# Patient Record
Sex: FEMALE | Race: WHITE | NOT HISPANIC OR LATINO
[De-identification: names, ages, dates, MRNs, and addresses within clinical notes are randomized per-mention and may not be internally consistent; named-entity substitution may affect disease eponyms.]

---

## 2016-11-30 RX ORDER — SERTRALINE 25 MG/1
100 TABLET, FILM COATED ORAL
Qty: 0 | Refills: 0 | COMMUNITY
Start: 2016-11-30

## 2016-11-30 RX ORDER — SERTRALINE 25 MG/1
1 TABLET, FILM COATED ORAL
Qty: 0 | Refills: 0 | COMMUNITY
Start: 2016-11-30

## 2016-12-01 RX ORDER — ZILEUTON 600 MG/1
2 TABLET, MULTILAYER, EXTENDED RELEASE ORAL
Qty: 0 | Refills: 0 | COMMUNITY
Start: 2016-12-01

## 2017-01-17 ENCOUNTER — APPOINTMENT (OUTPATIENT)
Dept: PAIN MANAGEMENT | Facility: CLINIC | Age: 67
End: 2017-01-17

## 2017-02-14 ENCOUNTER — MEDICATION RENEWAL (OUTPATIENT)
Age: 67
End: 2017-02-14

## 2017-03-07 ENCOUNTER — APPOINTMENT (OUTPATIENT)
Dept: PAIN MANAGEMENT | Facility: CLINIC | Age: 67
End: 2017-03-07

## 2017-03-07 VITALS
HEART RATE: 101 BPM | DIASTOLIC BLOOD PRESSURE: 72 MMHG | WEIGHT: 95 LBS | SYSTOLIC BLOOD PRESSURE: 110 MMHG | HEIGHT: 57 IN | BODY MASS INDEX: 20.49 KG/M2

## 2017-04-05 ENCOUNTER — MEDICATION RENEWAL (OUTPATIENT)
Age: 67
End: 2017-04-05

## 2017-05-01 ENCOUNTER — MESSAGE (OUTPATIENT)
Age: 67
End: 2017-05-01

## 2017-05-02 ENCOUNTER — MEDICATION RENEWAL (OUTPATIENT)
Age: 67
End: 2017-05-02

## 2017-05-02 ENCOUNTER — OTHER (OUTPATIENT)
Age: 67
End: 2017-05-02

## 2017-05-23 ENCOUNTER — APPOINTMENT (OUTPATIENT)
Dept: PAIN MANAGEMENT | Facility: CLINIC | Age: 67
End: 2017-05-23

## 2017-05-23 VITALS
HEART RATE: 105 BPM | WEIGHT: 94 LBS | SYSTOLIC BLOOD PRESSURE: 137 MMHG | DIASTOLIC BLOOD PRESSURE: 78 MMHG | HEIGHT: 57 IN | BODY MASS INDEX: 20.28 KG/M2

## 2017-06-10 ENCOUNTER — EMERGENCY (EMERGENCY)
Facility: HOSPITAL | Age: 67
LOS: 1 days | Discharge: HOME | End: 2017-06-10
Attending: EMERGENCY MEDICINE | Admitting: EMERGENCY MEDICINE
Payer: MEDICARE

## 2017-06-10 VITALS
TEMPERATURE: 99 F | SYSTOLIC BLOOD PRESSURE: 133 MMHG | OXYGEN SATURATION: 98 % | DIASTOLIC BLOOD PRESSURE: 77 MMHG | HEART RATE: 70 BPM | RESPIRATION RATE: 18 BRPM

## 2017-06-10 VITALS
DIASTOLIC BLOOD PRESSURE: 82 MMHG | OXYGEN SATURATION: 97 % | RESPIRATION RATE: 18 BRPM | HEART RATE: 79 BPM | TEMPERATURE: 99 F | SYSTOLIC BLOOD PRESSURE: 141 MMHG

## 2017-06-10 DIAGNOSIS — Z96.659 PRESENCE OF UNSPECIFIED ARTIFICIAL KNEE JOINT: Chronic | ICD-10-CM

## 2017-06-10 DIAGNOSIS — R29.810 FACIAL WEAKNESS: ICD-10-CM

## 2017-06-10 DIAGNOSIS — Z98.42 CATARACT EXTRACTION STATUS, LEFT EYE: Chronic | ICD-10-CM

## 2017-06-10 DIAGNOSIS — Z90.710 ACQUIRED ABSENCE OF BOTH CERVIX AND UTERUS: Chronic | ICD-10-CM

## 2017-06-10 PROBLEM — K51.90 ULCERATIVE COLITIS, UNSPECIFIED, WITHOUT COMPLICATIONS: Chronic | Status: ACTIVE | Noted: 2017-05-19

## 2017-06-10 PROBLEM — K86.89 OTHER SPECIFIED DISEASES OF PANCREAS: Chronic | Status: ACTIVE | Noted: 2017-05-19

## 2017-06-10 PROBLEM — J18.9 PNEUMONIA, UNSPECIFIED ORGANISM: Chronic | Status: ACTIVE | Noted: 2017-05-19

## 2017-06-10 PROBLEM — I27.2 OTHER SECONDARY PULMONARY HYPERTENSION: Chronic | Status: ACTIVE | Noted: 2017-05-19

## 2017-06-10 PROBLEM — R73.02 IMPAIRED GLUCOSE TOLERANCE (ORAL): Chronic | Status: ACTIVE | Noted: 2017-05-19

## 2017-06-10 LAB
ALBUMIN SERPL ELPH-MCNC: 3.8 G/DL — SIGNIFICANT CHANGE UP (ref 3.3–5)
ALP SERPL-CCNC: 62 U/L — SIGNIFICANT CHANGE UP (ref 40–120)
ALT FLD-CCNC: 13 U/L RC — SIGNIFICANT CHANGE UP (ref 10–45)
ANION GAP SERPL CALC-SCNC: 15 MMOL/L — SIGNIFICANT CHANGE UP (ref 5–17)
APTT BLD: 28.4 SEC — SIGNIFICANT CHANGE UP (ref 27.5–37.4)
AST SERPL-CCNC: 14 U/L — SIGNIFICANT CHANGE UP (ref 10–40)
BILIRUB SERPL-MCNC: 0.4 MG/DL — SIGNIFICANT CHANGE UP (ref 0.2–1.2)
BUN SERPL-MCNC: 18 MG/DL — SIGNIFICANT CHANGE UP (ref 7–23)
CALCIUM SERPL-MCNC: 8.9 MG/DL — SIGNIFICANT CHANGE UP (ref 8.4–10.5)
CHLORIDE SERPL-SCNC: 96 MMOL/L — SIGNIFICANT CHANGE UP (ref 96–108)
CO2 SERPL-SCNC: 28 MMOL/L — SIGNIFICANT CHANGE UP (ref 22–31)
CREAT SERPL-MCNC: 0.7 MG/DL — SIGNIFICANT CHANGE UP (ref 0.5–1.3)
GLUCOSE SERPL-MCNC: 59 MG/DL — LOW (ref 70–99)
HCT VFR BLD CALC: 34.9 % — SIGNIFICANT CHANGE UP (ref 34.5–45)
HGB BLD-MCNC: 11.6 G/DL — SIGNIFICANT CHANGE UP (ref 11.5–15.5)
INR BLD: 0.93 RATIO — SIGNIFICANT CHANGE UP (ref 0.88–1.16)
MCHC RBC-ENTMCNC: 32.8 PG — SIGNIFICANT CHANGE UP (ref 27–34)
MCHC RBC-ENTMCNC: 33.2 GM/DL — SIGNIFICANT CHANGE UP (ref 32–36)
MCV RBC AUTO: 98.8 FL — SIGNIFICANT CHANGE UP (ref 80–100)
PLATELET # BLD AUTO: 359 K/UL — SIGNIFICANT CHANGE UP (ref 150–400)
POTASSIUM SERPL-MCNC: 3.2 MMOL/L — LOW (ref 3.5–5.3)
POTASSIUM SERPL-SCNC: 3.2 MMOL/L — LOW (ref 3.5–5.3)
PROT SERPL-MCNC: 5.7 G/DL — LOW (ref 6–8.3)
PROTHROM AB SERPL-ACNC: 10.1 SEC — SIGNIFICANT CHANGE UP (ref 9.8–12.7)
RBC # BLD: 3.54 M/UL — LOW (ref 3.8–5.2)
RBC # FLD: 14 % — SIGNIFICANT CHANGE UP (ref 10.3–14.5)
SODIUM SERPL-SCNC: 139 MMOL/L — SIGNIFICANT CHANGE UP (ref 135–145)
WBC # BLD: 9.1 K/UL — SIGNIFICANT CHANGE UP (ref 3.8–10.5)
WBC # FLD AUTO: 9.1 K/UL — SIGNIFICANT CHANGE UP (ref 3.8–10.5)

## 2017-06-10 PROCEDURE — 70450 CT HEAD/BRAIN W/O DYE: CPT | Mod: 26

## 2017-06-10 PROCEDURE — 93010 ELECTROCARDIOGRAM REPORT: CPT

## 2017-06-10 PROCEDURE — 93005 ELECTROCARDIOGRAM TRACING: CPT

## 2017-06-10 PROCEDURE — 99284 EMERGENCY DEPT VISIT MOD MDM: CPT | Mod: 25

## 2017-06-10 PROCEDURE — 96374 THER/PROPH/DIAG INJ IV PUSH: CPT

## 2017-06-10 PROCEDURE — 85027 COMPLETE CBC AUTOMATED: CPT

## 2017-06-10 PROCEDURE — 80053 COMPREHEN METABOLIC PANEL: CPT

## 2017-06-10 PROCEDURE — 85730 THROMBOPLASTIN TIME PARTIAL: CPT

## 2017-06-10 PROCEDURE — 85610 PROTHROMBIN TIME: CPT

## 2017-06-10 PROCEDURE — 82962 GLUCOSE BLOOD TEST: CPT

## 2017-06-10 PROCEDURE — 85652 RBC SED RATE AUTOMATED: CPT

## 2017-06-10 PROCEDURE — 70450 CT HEAD/BRAIN W/O DYE: CPT

## 2017-06-10 RX ORDER — CLOPIDOGREL BISULFATE 75 MG/1
1 TABLET, FILM COATED ORAL
Qty: 30 | Refills: 0 | OUTPATIENT
Start: 2017-06-10

## 2017-06-10 RX ORDER — POTASSIUM CHLORIDE 20 MEQ
40 PACKET (EA) ORAL ONCE
Qty: 0 | Refills: 0 | Status: COMPLETED | OUTPATIENT
Start: 2017-06-10 | End: 2017-06-10

## 2017-06-10 RX ORDER — ATORVASTATIN CALCIUM 80 MG/1
1 TABLET, FILM COATED ORAL
Qty: 30 | Refills: 0 | OUTPATIENT
Start: 2017-06-10 | End: 2017-07-10

## 2017-06-10 RX ORDER — CLOPIDOGREL BISULFATE 75 MG/1
75 TABLET, FILM COATED ORAL DAILY
Qty: 0 | Refills: 0 | Status: DISCONTINUED | OUTPATIENT
Start: 2017-06-10 | End: 2017-06-14

## 2017-06-10 RX ORDER — DEXTROSE 50 % IN WATER 50 %
50 SYRINGE (ML) INTRAVENOUS ONCE
Qty: 0 | Refills: 0 | Status: COMPLETED | OUTPATIENT
Start: 2017-06-10 | End: 2017-06-10

## 2017-06-10 RX ADMIN — Medication 50 MILLILITER(S): at 13:09

## 2017-06-10 RX ADMIN — CLOPIDOGREL BISULFATE 75 MILLIGRAM(S): 75 TABLET, FILM COATED ORAL at 16:32

## 2017-06-10 RX ADMIN — Medication 40 MILLIEQUIVALENT(S): at 16:32

## 2017-06-10 NOTE — CONSULT NOTE ADULT - ASSESSMENT
66 y/o F w/ AD, prior CVA in past, p/w mild left facial droop and prior slurred speech, resolved now, likely 2/2 to CVA of unknown etiology. Family aware of possible diagnosis of CVA, explained in depth about further CVA symptoms and to bring pt back to ED immediately if symptoms progress. Pt and family would like to go home, given pt is unable to have closed MRI due to severe claustrophobia. Unable to perform CTA given allergy to contrast.

## 2017-06-10 NOTE — CONSULT NOTE ADULT - SUBJECTIVE AND OBJECTIVE BOX
Neurology Consult    Patient is a 67y old  Female who presents with a chief complaint of left facial droop     HPI: 68 y/o  F PMHx Alzheimer's Disease, silent CVA in past, asthma p/w left facial droop and slurred speech noted by daughter upon waking up this AM. Last time normal day prior to arrival 11:30 pm. Pt states she feels fine, daughter states speech is back to baseline, but still has mild facial abnormality. Denies weakness, chest pain, sensory changes, difficulty walking, dizziness, difficulty swallowing. On arrival to ED, NIHSS 1, pt out of window for tPA. CTH remarkable for severe atrophy.       PAST MEDICAL & SURGICAL HISTORY:  Fatty pancreas  Fatty liver  PNA (pneumonia)  Pulmonary HTN  IGT (impaired glucose tolerance)  Ulcerative colitis  Acid reflux  Anxiety  Depression  Mouth sores  HLD (hyperlipidemia)  Asthma  H/O: hysterectomy  H/O cataract extraction, left  History of knee replacement      Allergies    ASA; dye contrast (Anaphylaxis)  aspirin (Short breath)  penicillin (Short breath)  sulfa drugs (Short breath)    Intolerances        MEDICATIONS  (STANDING):    MEDICATIONS  (PRN):      Social History: Denies tob, EtOH use     FAMILY HISTORY:      Review of Systems:  CONSTITUTIONAL:  No weight loss, fever, chills, weakness or fatigue.  HEENT:  Eyes:  No visual loss, blurred vision, double vision or yellow sclerae. Ears, Nose, Throat:  No hearing loss, sneezing, congestion, runny nose or sore throat.  SKIN:  No rash or itching.  CARDIOVASCULAR:  No chest pain, chest pressure or chest discomfort. No palpitations or edema.  RESPIRATORY:  No shortness of breath, cough or sputum.  GASTROINTESTINAL:  No anorexia, nausea, vomiting or diarrhea. No abdominal pain or blood.  GENITOURINARY:  Burning on urination. Pregnancy. Last menstrual period, MM/DD/YYYY.  NEUROLOGICAL:  No headache, dizziness, syncope, paralysis, ataxia, numbness or tingling in the extremities. No change in bowel or bladder control.  MUSCULOSKELETAL:  No muscle, back pain, joint pain or stiffness.  HEMATOLOGIC:  No anemia, bleeding or bruising.  LYMPHATICS:  No enlarged nodes. No history of splenectomy.  PSYCHIATRIC:  No history of depression or anxiety.  ENDOCRINOLOGIC:  No reports of sweating, cold or heat intolerance. No polyuria or polydipsia.      Physical Exam:   Vital Signs Last 24 Hrs  T(C): 37.3, Max: 37.3 (06-10 @ 12:07)  T(F): 99.1, Max: 99.1 (06-10 @ 12:07)  HR: 74 (74 - 79)  BP: 139/80 (139/80 - 141/82)  BP(mean): --  RR: 18 (18 - 18)  SpO2: 97% (97% - 97%)    General Exam:   General appearance: No acute distress                 Neurological Exam:  Mental Status: AAOx3, fluent speech, follows simple commands, able to name  Cranial Nerves: EOMI, PERRL, VFF, V1-V3 intact, mild lower left facial droop, no dysarthria, tongue midline  Motor: strength 5/5 throughout. No drift x4  Sensation: Intact to LT throughout  Coordination: FTN intact b/l  Reflexes: 1+ bilateral biceps, brachioradialis, patellar and ankle  Gait: normal and stable.      Labs:     CBC Full  -  ( 10 Aryan 2017 12:47 )  WBC Count : 9.1 K/uL  Hemoglobin : 11.6 g/dL  Hematocrit : 34.9 %  Platelet Count - Automated : 359 K/uL  Mean Cell Volume : 98.8 fl  Mean Cell Hemoglobin : 32.8 pg  Mean Cell Hemoglobin Concentration : 33.2 gm/dL  Auto Neutrophil # : x  Auto Lymphocyte # : x  Auto Monocyte # : x  Auto Eosinophil # : x  Auto Basophil # : x  Auto Neutrophil % : x  Auto Lymphocyte % : x  Auto Monocyte % : x  Auto Eosinophil % : x  Auto Basophil % : x    06-10    139  |  96  |  18  ----------------------------<  59<L>  3.2<L>   |  28  |  0.70    Ca    8.9      10 Aryan 2017 12:47    TPro  5.7<L>  /  Alb  3.8  /  TBili  0.4  /  DBili  x   /  AST  14  /  ALT  13  /  AlkPhos  62  06-10    LIVER FUNCTIONS - ( 10 Aryan 2017 12:47 )  Alb: 3.8 g/dL / Pro: 5.7 g/dL / ALK PHOS: 62 U/L / ALT: 13 U/L RC / AST: 14 U/L / GGT: x           PT/INR - ( 10 Aryan 2017 12:47 )   PT: 10.1 sec;   INR: 0.93 ratio         PTT - ( 10 Aryan 2017 12:47 )  PTT:28.4 sec

## 2017-06-10 NOTE — ED ADULT NURSE NOTE - PMH
Acid reflux    Anxiety    Asthma    Depression    Fatty pancreas    HLD (hyperlipidemia)    IGT (impaired glucose tolerance)    Mouth sores    PNA (pneumonia)    Pulmonary HTN    Ulcerative colitis

## 2017-06-10 NOTE — ED PROVIDER NOTE - PROGRESS NOTE DETAILS
Talked to Dr. Dukes, covering for Dr. Cheek. asking neuro residents to come see the patient. Pt wishes to go home. Neuro resident spoke with pts neurologist who agrees and can see Monday. To start plavix and lipitor.

## 2017-06-10 NOTE — ED PROVIDER NOTE - OBJECTIVE STATEMENT
68 y/o female with pmhx of  PE comes in complaining of facial droop on the left side and slurred speech . The sx have been waxing and waning since this morning and last night she was asymptomatic. She has No weakness and had no other symptoms. She does not feel irregular.  She took Xanax last night because she was unable to sleep. Family at bedside states that she has had a "silent Stroke" in the past.

## 2017-06-10 NOTE — ED PROVIDER NOTE - MEDICAL DECISION MAKING DETAILS
slurred speech facial droop improving. TIA vs CVA vs hypoglycemia vs. complicated migraine vs other stroke mimics. finger stick 63  will give oral glucose. will do ct, labs, and ekg. Consult neurology. likely admit for MRI

## 2017-06-10 NOTE — ED PROVIDER NOTE - NEUROLOGICAL, MLM
Finger to nose exam normal. mild slurring of speech no paraphasic errors. tongue uvula midline.  cn 2-12. 5/5 upper extremity strength and 4/5 lower extremity strength. sensation intact.

## 2017-06-10 NOTE — ED ADULT NURSE NOTE - OBJECTIVE STATEMENT
66 y/o female BIB family, c/o facial droop on the left side and slurred speech on and off since this morning and last night,  pt was asymptomatic.  Pt denies weakness, pt  took Xanax last night because she was unable to sleep.   Denies fever, chills, chest pain, n/v/d.  Upper extremities with good motor strength.  Lower extremities with slightly mild decreased motor strength.  Sensation intact.  No acute respiratory distress noted. 66 y/o female BIB family, c/o facial droop on the left side and slurred speech on and off since this morning and last night,  pt was asymptomatic.  Pt denies weakness, pt  took Xanax last night because she was unable to sleep.   Denies fever, chills, chest pain, n/v/d.  Upper extremities with good motor strength.  Lower extremities with slightly mild decreased motor strength.  Sensation intact.   Mild slurring of speech. No acute respiratory distress noted.

## 2017-06-10 NOTE — ED PROVIDER NOTE - NS ED MD SCRIBE ATTENDING SCRIBE SECTIONS
VITAL SIGNS( Pullset)/REVIEW OF SYSTEMS/PAST MEDICAL/SURGICAL/SOCIAL HISTORY/RESULTS/PHYSICAL EXAM/HIV/INTAKE ASSESSMENT/SCREENINGS/HISTORY OF PRESENT ILLNESS/PROGRESS NOTE/DISPOSITION

## 2017-06-10 NOTE — CONSULT NOTE ADULT - PROBLEM SELECTOR RECOMMENDATION 9
- will be closely observed by family to assess for further neuro deficits   - MRI/MRA outpatient   -unable to tolerate CTA due to allergy   - will f/u w/ Dr. Cheek on 6/12 or return to ED immediately for new symptoms  -discussed w/ Dr. Dukes who agrees w/ above - will be closely observed by family to assess for further neuro deficits   - MRI/MRA outpatient   -unable to tolerate CTA due to allergy   - will f/u w/ Dr. Cheek on 6/12 or return to ED immediately for new symptoms  -discussed w/ Dr. Dukes who agrees w/ above  -please discharge on Plavix and Lipitor 40 mg

## 2017-06-15 ENCOUNTER — APPOINTMENT (OUTPATIENT)
Dept: CARDIOLOGY | Facility: CLINIC | Age: 67
End: 2017-06-15

## 2017-06-15 ENCOUNTER — NON-APPOINTMENT (OUTPATIENT)
Age: 67
End: 2017-06-15

## 2017-06-15 VITALS
SYSTOLIC BLOOD PRESSURE: 147 MMHG | HEIGHT: 57 IN | OXYGEN SATURATION: 94 % | BODY MASS INDEX: 20.09 KG/M2 | DIASTOLIC BLOOD PRESSURE: 75 MMHG | HEART RATE: 111 BPM | WEIGHT: 93.1 LBS | TEMPERATURE: 98.8 F

## 2017-06-15 DIAGNOSIS — K14.6 GLOSSODYNIA: ICD-10-CM

## 2017-06-15 DIAGNOSIS — K58.9 IRRITABLE BOWEL SYNDROME W/OUT DIARRHEA: ICD-10-CM

## 2017-06-15 DIAGNOSIS — Z86.73 PERSONAL HISTORY OF TRANSIENT ISCHEMIC ATTACK (TIA), AND CEREBRAL INFARCTION W/OUT RESIDUAL DEFICITS: ICD-10-CM

## 2017-06-15 RX ORDER — NYSTATIN 100000 [USP'U]/ML
100000 SUSPENSION ORAL
Qty: 200 | Refills: 0 | Status: COMPLETED | COMMUNITY
Start: 2017-05-30

## 2017-06-15 RX ORDER — VALACYCLOVIR 1 G/1
1 TABLET, FILM COATED ORAL
Qty: 60 | Refills: 0 | Status: COMPLETED | COMMUNITY
Start: 2017-04-27

## 2017-06-15 RX ORDER — ATORVASTATIN CALCIUM 20 MG/1
20 TABLET, FILM COATED ORAL
Qty: 30 | Refills: 0 | Status: COMPLETED | COMMUNITY
Start: 2017-06-10

## 2017-06-15 RX ORDER — NYSTATIN 100000 [USP'U]/ML
100000 SUSPENSION ORAL 3 TIMES DAILY
Qty: 1 | Refills: 0 | Status: ACTIVE | COMMUNITY
Start: 2017-06-15

## 2017-06-15 RX ORDER — ALPRAZOLAM 0.5 MG/1
0.5 TABLET ORAL
Qty: 90 | Refills: 0 | Status: COMPLETED | COMMUNITY
Start: 2017-05-30

## 2017-06-15 RX ORDER — LUBIPROSTONE 24 UG/1
24 CAPSULE, GELATIN COATED ORAL
Qty: 60 | Refills: 0 | Status: COMPLETED | COMMUNITY
Start: 2017-01-16

## 2017-06-15 RX ORDER — CLOPIDOGREL BISULFATE 75 MG/1
75 TABLET, FILM COATED ORAL
Qty: 30 | Refills: 0 | Status: COMPLETED | COMMUNITY
Start: 2017-06-10

## 2017-06-16 PROBLEM — Z86.73 STATUS POST CVA: Status: ACTIVE | Noted: 2017-06-15

## 2017-06-19 ENCOUNTER — MEDICATION RENEWAL (OUTPATIENT)
Age: 67
End: 2017-06-19

## 2017-08-03 ENCOUNTER — MEDICATION RENEWAL (OUTPATIENT)
Age: 67
End: 2017-08-03

## 2017-08-10 ENCOUNTER — APPOINTMENT (OUTPATIENT)
Dept: RADIOLOGY | Facility: IMAGING CENTER | Age: 67
End: 2017-08-10
Payer: MEDICARE

## 2017-08-10 ENCOUNTER — OUTPATIENT (OUTPATIENT)
Dept: OUTPATIENT SERVICES | Facility: HOSPITAL | Age: 67
LOS: 1 days | End: 2017-08-10
Payer: MEDICARE

## 2017-08-10 DIAGNOSIS — Z96.659 PRESENCE OF UNSPECIFIED ARTIFICIAL KNEE JOINT: Chronic | ICD-10-CM

## 2017-08-10 DIAGNOSIS — Z00.8 ENCOUNTER FOR OTHER GENERAL EXAMINATION: ICD-10-CM

## 2017-08-10 DIAGNOSIS — Z98.42 CATARACT EXTRACTION STATUS, LEFT EYE: Chronic | ICD-10-CM

## 2017-08-10 DIAGNOSIS — Z90.710 ACQUIRED ABSENCE OF BOTH CERVIX AND UTERUS: Chronic | ICD-10-CM

## 2017-08-10 PROCEDURE — 73060 X-RAY EXAM OF HUMERUS: CPT | Mod: 26,LT

## 2017-08-10 PROCEDURE — 73060 X-RAY EXAM OF HUMERUS: CPT

## 2017-09-02 ENCOUNTER — INPATIENT (INPATIENT)
Facility: HOSPITAL | Age: 67
LOS: 1 days | Discharge: HOME | DRG: 641 | End: 2017-09-04
Attending: INTERNAL MEDICINE | Admitting: INTERNAL MEDICINE
Payer: COMMERCIAL

## 2017-09-02 VITALS
RESPIRATION RATE: 20 BRPM | SYSTOLIC BLOOD PRESSURE: 132 MMHG | TEMPERATURE: 98 F | HEART RATE: 81 BPM | OXYGEN SATURATION: 94 % | DIASTOLIC BLOOD PRESSURE: 89 MMHG

## 2017-09-02 DIAGNOSIS — Z90.710 ACQUIRED ABSENCE OF BOTH CERVIX AND UTERUS: Chronic | ICD-10-CM

## 2017-09-02 DIAGNOSIS — Z96.659 PRESENCE OF UNSPECIFIED ARTIFICIAL KNEE JOINT: Chronic | ICD-10-CM

## 2017-09-02 DIAGNOSIS — Z98.42 CATARACT EXTRACTION STATUS, LEFT EYE: Chronic | ICD-10-CM

## 2017-09-02 PROCEDURE — 99285 EMERGENCY DEPT VISIT HI MDM: CPT | Mod: GC

## 2017-09-02 PROCEDURE — 71020: CPT | Mod: 26

## 2017-09-02 RX ORDER — GABAPENTIN 400 MG/1
100 CAPSULE ORAL ONCE
Qty: 0 | Refills: 0 | Status: COMPLETED | OUTPATIENT
Start: 2017-09-02 | End: 2017-09-02

## 2017-09-02 RX ORDER — IPRATROPIUM/ALBUTEROL SULFATE 18-103MCG
3 AEROSOL WITH ADAPTER (GRAM) INHALATION ONCE
Qty: 0 | Refills: 0 | Status: COMPLETED | OUTPATIENT
Start: 2017-09-02 | End: 2017-09-02

## 2017-09-02 NOTE — ED PROVIDER NOTE - MEDICAL DECISION MAKING DETAILS
67 y F c anxiety, asthma, alzheimers and burning mouth syndrome; tachypneic but no wheeze on exam.  No oral lesions.  Doubt asthma contributing, seems to be more related to anxiety.  Will hold benzos given pt's prior adverse rx.  Will try her home dose gabapentin and reassess.  Cardiac w/u c trop x 1 and ekg.  CXR.  Will reassess.  --BMM

## 2017-09-02 NOTE — ED PROVIDER NOTE - PHYSICAL EXAMINATION
GENERAL: AAOx4, GCS 15, anxious but NAD, WDWN; HEENT: MMM, no jugular venous distension, supple neck, PERRLA, EOMI, nonicteric sclera, no oral lesions, no tonsilar deviation; PULM: CTA B, no crackles/rubs/rales; CV: RRR, S1S2, no MRG; ABD: Flat abdomen, NTND, no R/G/R, no CVAT.  MSK: ALLAN, +2 pulses x4;  NEURO: No obvious focal deficits; PSYCH: AAOx3, clear thought and normal sensorium.  SKIN -- sacral decub ulcer, no surrounding erythema

## 2017-09-02 NOTE — ED PROVIDER NOTE - OBJECTIVE STATEMENT
67 y F h/o asthma, anxiety, alzheimers, recent stroke several months ago for which she's on plavix, also c burning mouth syndrome that as per family is refractory to medications.  P/W sob over the last day or so, related c many stressors at home, worsening in her mouth burning/tongue pain over the last several weeks.  No CP or dyspnea, no exertional symptoms, no cardiac hx.  No n/v.  No confusion.  Had been on prn alprazolam but self d/c'd after pt became agitated after taking last week.  On neurontin 100 mg tid but  has d/c'd nightly dose 2/2 pt "feeling drugged" after taking.  No falls.  Had prox hum fx on L that is being managed conservatively, occurred 1+ mon ago.  No travel.  No cough.  No sputum.

## 2017-09-02 NOTE — ED ADULT NURSE NOTE - CHPI ED SYMPTOMS NEG
no diaphoresis/no chest pain/no body aches/no fever/no chills/no wheezing/no cough/no headache/no edema/no hemoptysis

## 2017-09-03 DIAGNOSIS — G30.8 OTHER ALZHEIMER'S DISEASE: ICD-10-CM

## 2017-09-03 DIAGNOSIS — K14.6 GLOSSODYNIA: ICD-10-CM

## 2017-09-03 DIAGNOSIS — E83.42 HYPOMAGNESEMIA: ICD-10-CM

## 2017-09-03 DIAGNOSIS — S42.293A OTHER DISPLACED FRACTURE OF UPPER END OF UNSPECIFIED HUMERUS, INITIAL ENCOUNTER FOR CLOSED FRACTURE: Chronic | ICD-10-CM

## 2017-09-03 DIAGNOSIS — S42.309A UNSPECIFIED FRACTURE OF SHAFT OF HUMERUS, UNSPECIFIED ARM, INITIAL ENCOUNTER FOR CLOSED FRACTURE: ICD-10-CM

## 2017-09-03 DIAGNOSIS — E87.8 OTHER DISORDERS OF ELECTROLYTE AND FLUID BALANCE, NOT ELSEWHERE CLASSIFIED: ICD-10-CM

## 2017-09-03 DIAGNOSIS — E11.65 TYPE 2 DIABETES MELLITUS WITH HYPERGLYCEMIA: ICD-10-CM

## 2017-09-03 DIAGNOSIS — J45.909 UNSPECIFIED ASTHMA, UNCOMPLICATED: ICD-10-CM

## 2017-09-03 LAB
ALBUMIN SERPL ELPH-MCNC: 3.3 G/DL — SIGNIFICANT CHANGE UP (ref 3.3–5)
ALP SERPL-CCNC: 90 U/L — SIGNIFICANT CHANGE UP (ref 40–120)
ALT FLD-CCNC: 11 U/L RC — SIGNIFICANT CHANGE UP (ref 10–45)
ANION GAP SERPL CALC-SCNC: 11 MMOL/L — SIGNIFICANT CHANGE UP (ref 5–17)
ANION GAP SERPL CALC-SCNC: 16 MMOL/L — SIGNIFICANT CHANGE UP (ref 5–17)
ANION GAP SERPL CALC-SCNC: 21 MMOL/L — HIGH (ref 5–17)
APPEARANCE UR: CLEAR — SIGNIFICANT CHANGE UP
APTT BLD: 36.8 SEC — SIGNIFICANT CHANGE UP (ref 27.5–37.4)
AST SERPL-CCNC: 16 U/L — SIGNIFICANT CHANGE UP (ref 10–40)
BASOPHILS # BLD AUTO: 0 K/UL — SIGNIFICANT CHANGE UP (ref 0–0.2)
BASOPHILS # BLD AUTO: 0 K/UL — SIGNIFICANT CHANGE UP (ref 0–0.2)
BASOPHILS NFR BLD AUTO: 0.1 % — SIGNIFICANT CHANGE UP (ref 0–2)
BASOPHILS NFR BLD AUTO: 0.2 % — SIGNIFICANT CHANGE UP (ref 0–2)
BILIRUB SERPL-MCNC: 0.6 MG/DL — SIGNIFICANT CHANGE UP (ref 0.2–1.2)
BILIRUB UR-MCNC: NEGATIVE — SIGNIFICANT CHANGE UP
BUN SERPL-MCNC: 13 MG/DL — SIGNIFICANT CHANGE UP (ref 7–23)
BUN SERPL-MCNC: 16 MG/DL — SIGNIFICANT CHANGE UP (ref 7–23)
BUN SERPL-MCNC: 20 MG/DL — SIGNIFICANT CHANGE UP (ref 7–23)
CALCIUM SERPL-MCNC: 7.6 MG/DL — LOW (ref 8.4–10.5)
CALCIUM SERPL-MCNC: 8.1 MG/DL — LOW (ref 8.4–10.5)
CALCIUM SERPL-MCNC: 8.2 MG/DL — LOW (ref 8.4–10.5)
CHLORIDE SERPL-SCNC: 90 MMOL/L — LOW (ref 96–108)
CHLORIDE SERPL-SCNC: 94 MMOL/L — LOW (ref 96–108)
CHLORIDE SERPL-SCNC: 98 MMOL/L — SIGNIFICANT CHANGE UP (ref 96–108)
CK MB BLD-MCNC: 8.6 % — HIGH (ref 0–3.5)
CK MB CFR SERPL CALC: 2.5 NG/ML — SIGNIFICANT CHANGE UP (ref 0–3.8)
CK MB CFR SERPL CALC: 3.1 NG/ML — SIGNIFICANT CHANGE UP (ref 0–3.8)
CK SERPL-CCNC: 29 U/L — SIGNIFICANT CHANGE UP (ref 25–170)
CO2 SERPL-SCNC: 19 MMOL/L — LOW (ref 22–31)
CO2 SERPL-SCNC: 20 MMOL/L — LOW (ref 22–31)
CO2 SERPL-SCNC: 23 MMOL/L — SIGNIFICANT CHANGE UP (ref 22–31)
COLOR SPEC: SIGNIFICANT CHANGE UP
CREAT SERPL-MCNC: 0.55 MG/DL — SIGNIFICANT CHANGE UP (ref 0.5–1.3)
CREAT SERPL-MCNC: 0.65 MG/DL — SIGNIFICANT CHANGE UP (ref 0.5–1.3)
CREAT SERPL-MCNC: 0.75 MG/DL — SIGNIFICANT CHANGE UP (ref 0.5–1.3)
DIFF PNL FLD: NEGATIVE — SIGNIFICANT CHANGE UP
EOSINOPHIL # BLD AUTO: 0 K/UL — SIGNIFICANT CHANGE UP (ref 0–0.5)
EOSINOPHIL # BLD AUTO: 0 K/UL — SIGNIFICANT CHANGE UP (ref 0–0.5)
EOSINOPHIL NFR BLD AUTO: 0.2 % — SIGNIFICANT CHANGE UP (ref 0–6)
EOSINOPHIL NFR BLD AUTO: 0.2 % — SIGNIFICANT CHANGE UP (ref 0–6)
GAS PNL BLDA: SIGNIFICANT CHANGE UP
GAS PNL BLDA: SIGNIFICANT CHANGE UP
GLUCOSE SERPL-MCNC: 116 MG/DL — HIGH (ref 70–99)
GLUCOSE SERPL-MCNC: 202 MG/DL — HIGH (ref 70–99)
GLUCOSE SERPL-MCNC: 313 MG/DL — HIGH (ref 70–99)
GLUCOSE UR QL: 100
HCT VFR BLD CALC: 30.8 % — LOW (ref 34.5–45)
HCT VFR BLD CALC: 36 % — SIGNIFICANT CHANGE UP (ref 34.5–45)
HGB BLD-MCNC: 10.2 G/DL — LOW (ref 11.5–15.5)
HGB BLD-MCNC: 11.7 G/DL — SIGNIFICANT CHANGE UP (ref 11.5–15.5)
INR BLD: 1.05 RATIO — SIGNIFICANT CHANGE UP (ref 0.88–1.16)
KETONES UR-MCNC: NEGATIVE — SIGNIFICANT CHANGE UP
LACTATE SERPL-SCNC: 1 MMOL/L — SIGNIFICANT CHANGE UP (ref 0.7–2)
LEUKOCYTE ESTERASE UR-ACNC: NEGATIVE — SIGNIFICANT CHANGE UP
LYMPHOCYTES # BLD AUTO: 0.4 K/UL — LOW (ref 1–3.3)
LYMPHOCYTES # BLD AUTO: 1.1 K/UL — SIGNIFICANT CHANGE UP (ref 1–3.3)
LYMPHOCYTES # BLD AUTO: 3.6 % — LOW (ref 13–44)
LYMPHOCYTES # BLD AUTO: 8 % — LOW (ref 13–44)
MACROCYTES BLD QL: SLIGHT — SIGNIFICANT CHANGE UP
MAGNESIUM SERPL-MCNC: 0.9 MG/DL — CRITICAL LOW (ref 1.6–2.6)
MAGNESIUM SERPL-MCNC: 2.6 MG/DL — SIGNIFICANT CHANGE UP (ref 1.6–2.6)
MCHC RBC-ENTMCNC: 32.6 GM/DL — SIGNIFICANT CHANGE UP (ref 32–36)
MCHC RBC-ENTMCNC: 33.1 GM/DL — SIGNIFICANT CHANGE UP (ref 32–36)
MCHC RBC-ENTMCNC: 33.9 PG — SIGNIFICANT CHANGE UP (ref 27–34)
MCHC RBC-ENTMCNC: 34.3 PG — HIGH (ref 27–34)
MCV RBC AUTO: 103 FL — HIGH (ref 80–100)
MCV RBC AUTO: 104 FL — HIGH (ref 80–100)
MONOCYTES # BLD AUTO: 0.1 K/UL — SIGNIFICANT CHANGE UP (ref 0–0.9)
MONOCYTES # BLD AUTO: 1.2 K/UL — HIGH (ref 0–0.9)
MONOCYTES NFR BLD AUTO: 0.9 % — LOW (ref 2–14)
MONOCYTES NFR BLD AUTO: 8.6 % — SIGNIFICANT CHANGE UP (ref 2–14)
NEUTROPHILS # BLD AUTO: 11.3 K/UL — HIGH (ref 1.8–7.4)
NEUTROPHILS # BLD AUTO: 11.3 K/UL — HIGH (ref 1.8–7.4)
NEUTROPHILS NFR BLD AUTO: 83.1 % — HIGH (ref 43–77)
NEUTROPHILS NFR BLD AUTO: 95 % — HIGH (ref 43–77)
NITRITE UR-MCNC: NEGATIVE — SIGNIFICANT CHANGE UP
PH UR: 7 — SIGNIFICANT CHANGE UP (ref 5–8)
PLAT MORPH BLD: NORMAL — SIGNIFICANT CHANGE UP
PLATELET # BLD AUTO: 344 K/UL — SIGNIFICANT CHANGE UP (ref 150–400)
PLATELET # BLD AUTO: 360 K/UL — SIGNIFICANT CHANGE UP (ref 150–400)
POLYCHROMASIA BLD QL SMEAR: SLIGHT — SIGNIFICANT CHANGE UP
POTASSIUM SERPL-MCNC: 3 MMOL/L — LOW (ref 3.5–5.3)
POTASSIUM SERPL-MCNC: 3.5 MMOL/L — SIGNIFICANT CHANGE UP (ref 3.5–5.3)
POTASSIUM SERPL-MCNC: 5.9 MMOL/L — HIGH (ref 3.5–5.3)
POTASSIUM SERPL-SCNC: 3 MMOL/L — LOW (ref 3.5–5.3)
POTASSIUM SERPL-SCNC: 3.5 MMOL/L — SIGNIFICANT CHANGE UP (ref 3.5–5.3)
POTASSIUM SERPL-SCNC: 5.9 MMOL/L — HIGH (ref 3.5–5.3)
PROT SERPL-MCNC: 5.5 G/DL — LOW (ref 6–8.3)
PROT UR-MCNC: NEGATIVE — SIGNIFICANT CHANGE UP
PROTHROM AB SERPL-ACNC: 11.5 SEC — SIGNIFICANT CHANGE UP (ref 9.8–12.7)
RBC # BLD: 2.98 M/UL — LOW (ref 3.8–5.2)
RBC # BLD: 3.47 M/UL — LOW (ref 3.8–5.2)
RBC # FLD: 15.8 % — HIGH (ref 10.3–14.5)
RBC # FLD: 15.8 % — HIGH (ref 10.3–14.5)
RBC BLD AUTO: ABNORMAL
SCHISTOCYTES BLD QL AUTO: SLIGHT — SIGNIFICANT CHANGE UP
SODIUM SERPL-SCNC: 130 MMOL/L — LOW (ref 135–145)
SODIUM SERPL-SCNC: 130 MMOL/L — LOW (ref 135–145)
SODIUM SERPL-SCNC: 132 MMOL/L — LOW (ref 135–145)
SP GR SPEC: 1.01 — SIGNIFICANT CHANGE UP (ref 1.01–1.02)
SPHEROCYTES BLD QL SMEAR: SLIGHT — SIGNIFICANT CHANGE UP
TROPONIN T SERPL-MCNC: <0.01 NG/ML — SIGNIFICANT CHANGE UP (ref 0–0.06)
TROPONIN T SERPL-MCNC: <0.01 NG/ML — SIGNIFICANT CHANGE UP (ref 0–0.06)
TSH SERPL-MCNC: 0.82 UIU/ML — SIGNIFICANT CHANGE UP (ref 0.27–4.2)
UROBILINOGEN FLD QL: 2
WBC # BLD: 11.9 K/UL — HIGH (ref 3.8–10.5)
WBC # BLD: 13.6 K/UL — HIGH (ref 3.8–10.5)
WBC # FLD AUTO: 11.9 K/UL — HIGH (ref 3.8–10.5)
WBC # FLD AUTO: 13.6 K/UL — HIGH (ref 3.8–10.5)

## 2017-09-03 PROCEDURE — 93010 ELECTROCARDIOGRAM REPORT: CPT

## 2017-09-03 PROCEDURE — 99223 1ST HOSP IP/OBS HIGH 75: CPT

## 2017-09-03 RX ORDER — ZILEUTON 600 MG/1
600 TABLET, MULTILAYER, EXTENDED RELEASE ORAL
Qty: 0 | Refills: 0 | Status: DISCONTINUED | OUTPATIENT
Start: 2017-09-03 | End: 2017-09-04

## 2017-09-03 RX ORDER — OXYCODONE HYDROCHLORIDE 5 MG/1
5 TABLET ORAL
Qty: 0 | Refills: 0 | Status: DISCONTINUED | OUTPATIENT
Start: 2017-09-03 | End: 2017-09-04

## 2017-09-03 RX ORDER — SERTRALINE 25 MG/1
50 TABLET, FILM COATED ORAL DAILY
Qty: 0 | Refills: 0 | Status: DISCONTINUED | OUTPATIENT
Start: 2017-09-03 | End: 2017-09-04

## 2017-09-03 RX ORDER — CLOPIDOGREL BISULFATE 75 MG/1
75 TABLET, FILM COATED ORAL DAILY
Qty: 0 | Refills: 0 | Status: DISCONTINUED | OUTPATIENT
Start: 2017-09-03 | End: 2017-09-04

## 2017-09-03 RX ORDER — SODIUM CHLORIDE 9 MG/ML
50 INJECTION INTRAMUSCULAR; INTRAVENOUS; SUBCUTANEOUS ONCE
Qty: 0 | Refills: 0 | Status: DISCONTINUED | OUTPATIENT
Start: 2017-09-03 | End: 2017-09-03

## 2017-09-03 RX ORDER — ACETAMINOPHEN 500 MG
650 TABLET ORAL EVERY 6 HOURS
Qty: 0 | Refills: 0 | Status: DISCONTINUED | OUTPATIENT
Start: 2017-09-03 | End: 2017-09-04

## 2017-09-03 RX ORDER — GLUCAGON INJECTION, SOLUTION 0.5 MG/.1ML
1 INJECTION, SOLUTION SUBCUTANEOUS ONCE
Qty: 0 | Refills: 0 | Status: DISCONTINUED | OUTPATIENT
Start: 2017-09-03 | End: 2017-09-04

## 2017-09-03 RX ORDER — METHADONE HYDROCHLORIDE 40 MG/1
10 TABLET ORAL
Qty: 0 | Refills: 0 | Status: DISCONTINUED | OUTPATIENT
Start: 2017-09-03 | End: 2017-09-04

## 2017-09-03 RX ORDER — ALBUTEROL 90 UG/1
1 AEROSOL, METERED ORAL EVERY 4 HOURS
Qty: 0 | Refills: 0 | Status: DISCONTINUED | OUTPATIENT
Start: 2017-09-03 | End: 2017-09-04

## 2017-09-03 RX ORDER — MAGNESIUM SULFATE 500 MG/ML
2 VIAL (ML) INJECTION ONCE
Qty: 0 | Refills: 0 | Status: COMPLETED | OUTPATIENT
Start: 2017-09-03 | End: 2017-09-03

## 2017-09-03 RX ORDER — EZETIMIBE 10 MG/1
10 TABLET ORAL DAILY
Qty: 0 | Refills: 0 | Status: DISCONTINUED | OUTPATIENT
Start: 2017-09-03 | End: 2017-09-04

## 2017-09-03 RX ORDER — TIOTROPIUM BROMIDE 18 UG/1
1 CAPSULE ORAL; RESPIRATORY (INHALATION) DAILY
Qty: 0 | Refills: 0 | Status: DISCONTINUED | OUTPATIENT
Start: 2017-09-03 | End: 2017-09-03

## 2017-09-03 RX ORDER — SODIUM POLYSTYRENE SULFONATE 4.1 MEQ/G
15 POWDER, FOR SUSPENSION ORAL ONCE
Qty: 0 | Refills: 0 | Status: DISCONTINUED | OUTPATIENT
Start: 2017-09-03 | End: 2017-09-03

## 2017-09-03 RX ORDER — INSULIN LISPRO 100/ML
VIAL (ML) SUBCUTANEOUS AT BEDTIME
Qty: 0 | Refills: 0 | Status: DISCONTINUED | OUTPATIENT
Start: 2017-09-03 | End: 2017-09-04

## 2017-09-03 RX ORDER — SODIUM CHLORIDE 9 MG/ML
1000 INJECTION INTRAMUSCULAR; INTRAVENOUS; SUBCUTANEOUS
Qty: 0 | Refills: 0 | Status: DISCONTINUED | OUTPATIENT
Start: 2017-09-03 | End: 2017-09-04

## 2017-09-03 RX ORDER — ONDANSETRON 8 MG/1
4 TABLET, FILM COATED ORAL EVERY 12 HOURS
Qty: 0 | Refills: 0 | Status: DISCONTINUED | OUTPATIENT
Start: 2017-09-03 | End: 2017-09-04

## 2017-09-03 RX ORDER — DEXTROSE 50 % IN WATER 50 %
1 SYRINGE (ML) INTRAVENOUS ONCE
Qty: 0 | Refills: 0 | Status: DISCONTINUED | OUTPATIENT
Start: 2017-09-03 | End: 2017-09-04

## 2017-09-03 RX ORDER — HEPARIN SODIUM 5000 [USP'U]/ML
5000 INJECTION INTRAVENOUS; SUBCUTANEOUS EVERY 12 HOURS
Qty: 0 | Refills: 0 | Status: DISCONTINUED | OUTPATIENT
Start: 2017-09-03 | End: 2017-09-04

## 2017-09-03 RX ORDER — DEXTROSE 50 % IN WATER 50 %
25 SYRINGE (ML) INTRAVENOUS ONCE
Qty: 0 | Refills: 0 | Status: DISCONTINUED | OUTPATIENT
Start: 2017-09-03 | End: 2017-09-04

## 2017-09-03 RX ORDER — IPRATROPIUM/ALBUTEROL SULFATE 18-103MCG
3 AEROSOL WITH ADAPTER (GRAM) INHALATION EVERY 6 HOURS
Qty: 0 | Refills: 0 | Status: DISCONTINUED | OUTPATIENT
Start: 2017-09-03 | End: 2017-09-04

## 2017-09-03 RX ORDER — INSULIN LISPRO 100/ML
VIAL (ML) SUBCUTANEOUS
Qty: 0 | Refills: 0 | Status: DISCONTINUED | OUTPATIENT
Start: 2017-09-03 | End: 2017-09-04

## 2017-09-03 RX ORDER — ATORVASTATIN CALCIUM 80 MG/1
20 TABLET, FILM COATED ORAL AT BEDTIME
Qty: 0 | Refills: 0 | Status: DISCONTINUED | OUTPATIENT
Start: 2017-09-03 | End: 2017-09-04

## 2017-09-03 RX ORDER — ALPRAZOLAM 0.25 MG
0.25 TABLET ORAL DAILY
Qty: 0 | Refills: 0 | Status: DISCONTINUED | OUTPATIENT
Start: 2017-09-03 | End: 2017-09-04

## 2017-09-03 RX ORDER — ETHACRYNIC ACID 25 MG/1
25 TABLET ORAL ONCE
Qty: 0 | Refills: 0 | Status: DISCONTINUED | OUTPATIENT
Start: 2017-09-03 | End: 2017-09-03

## 2017-09-03 RX ORDER — SODIUM CHLORIDE 9 MG/ML
1000 INJECTION, SOLUTION INTRAVENOUS
Qty: 0 | Refills: 0 | Status: DISCONTINUED | OUTPATIENT
Start: 2017-09-03 | End: 2017-09-04

## 2017-09-03 RX ORDER — TIOTROPIUM BROMIDE 18 UG/1
1 CAPSULE ORAL; RESPIRATORY (INHALATION) DAILY
Qty: 0 | Refills: 0 | Status: DISCONTINUED | OUTPATIENT
Start: 2017-09-03 | End: 2017-09-04

## 2017-09-03 RX ORDER — POTASSIUM CHLORIDE 20 MEQ
20 PACKET (EA) ORAL ONCE
Qty: 0 | Refills: 0 | Status: COMPLETED | OUTPATIENT
Start: 2017-09-03 | End: 2017-09-03

## 2017-09-03 RX ORDER — SODIUM CHLORIDE 9 MG/ML
500 INJECTION INTRAMUSCULAR; INTRAVENOUS; SUBCUTANEOUS ONCE
Qty: 0 | Refills: 0 | Status: COMPLETED | OUTPATIENT
Start: 2017-09-03 | End: 2017-09-03

## 2017-09-03 RX ORDER — INSULIN GLARGINE 100 [IU]/ML
7 INJECTION, SOLUTION SUBCUTANEOUS EVERY MORNING
Qty: 0 | Refills: 0 | Status: DISCONTINUED | OUTPATIENT
Start: 2017-09-03 | End: 2017-09-04

## 2017-09-03 RX ORDER — DEXTROSE 50 % IN WATER 50 %
12.5 SYRINGE (ML) INTRAVENOUS ONCE
Qty: 0 | Refills: 0 | Status: DISCONTINUED | OUTPATIENT
Start: 2017-09-03 | End: 2017-09-04

## 2017-09-03 RX ORDER — BUDESONIDE AND FORMOTEROL FUMARATE DIHYDRATE 160; 4.5 UG/1; UG/1
2 AEROSOL RESPIRATORY (INHALATION)
Qty: 0 | Refills: 0 | Status: DISCONTINUED | OUTPATIENT
Start: 2017-09-03 | End: 2017-09-04

## 2017-09-03 RX ORDER — MAGNESIUM OXIDE 400 MG ORAL TABLET 241.3 MG
400 TABLET ORAL
Qty: 0 | Refills: 0 | Status: DISCONTINUED | OUTPATIENT
Start: 2017-09-03 | End: 2017-09-04

## 2017-09-03 RX ORDER — CALCITONIN SALMON 200 [IU]/ML
1 INJECTION, SOLUTION INTRAMUSCULAR DAILY
Qty: 0 | Refills: 0 | Status: DISCONTINUED | OUTPATIENT
Start: 2017-09-03 | End: 2017-09-04

## 2017-09-03 RX ORDER — POTASSIUM CHLORIDE 20 MEQ
40 PACKET (EA) ORAL EVERY 4 HOURS
Qty: 0 | Refills: 0 | Status: COMPLETED | OUTPATIENT
Start: 2017-09-03 | End: 2017-09-03

## 2017-09-03 RX ORDER — ETHACRYNIC ACID 25 MG/1
25 TABLET ORAL ONCE
Qty: 0 | Refills: 0 | Status: DISCONTINUED | OUTPATIENT
Start: 2017-09-03 | End: 2017-09-04

## 2017-09-03 RX ADMIN — Medication 20 MILLIEQUIVALENT(S): at 05:21

## 2017-09-03 RX ADMIN — GABAPENTIN 100 MILLIGRAM(S): 400 CAPSULE ORAL at 00:07

## 2017-09-03 RX ADMIN — CALCITONIN SALMON 1 SPRAY(S): 200 INJECTION, SOLUTION INTRAMUSCULAR at 14:05

## 2017-09-03 RX ADMIN — SODIUM CHLORIDE 500 MILLILITER(S): 9 INJECTION INTRAMUSCULAR; INTRAVENOUS; SUBCUTANEOUS at 02:30

## 2017-09-03 RX ADMIN — Medication 40 MILLIEQUIVALENT(S): at 09:06

## 2017-09-03 RX ADMIN — Medication 10 MILLIGRAM(S): at 05:21

## 2017-09-03 RX ADMIN — Medication 1: at 09:07

## 2017-09-03 RX ADMIN — SODIUM CHLORIDE 50 MILLILITER(S): 9 INJECTION INTRAMUSCULAR; INTRAVENOUS; SUBCUTANEOUS at 23:57

## 2017-09-03 RX ADMIN — SODIUM CHLORIDE 500 MILLILITER(S): 9 INJECTION INTRAMUSCULAR; INTRAVENOUS; SUBCUTANEOUS at 01:16

## 2017-09-03 RX ADMIN — BUDESONIDE AND FORMOTEROL FUMARATE DIHYDRATE 2 PUFF(S): 160; 4.5 AEROSOL RESPIRATORY (INHALATION) at 05:20

## 2017-09-03 RX ADMIN — BUDESONIDE AND FORMOTEROL FUMARATE DIHYDRATE 2 PUFF(S): 160; 4.5 AEROSOL RESPIRATORY (INHALATION) at 18:14

## 2017-09-03 RX ADMIN — ATORVASTATIN CALCIUM 20 MILLIGRAM(S): 80 TABLET, FILM COATED ORAL at 21:05

## 2017-09-03 RX ADMIN — Medication 50 GRAM(S): at 04:58

## 2017-09-03 RX ADMIN — INSULIN GLARGINE 7 UNIT(S): 100 INJECTION, SOLUTION SUBCUTANEOUS at 10:31

## 2017-09-03 RX ADMIN — Medication 40 MILLIEQUIVALENT(S): at 14:05

## 2017-09-03 RX ADMIN — HEPARIN SODIUM 5000 UNIT(S): 5000 INJECTION INTRAVENOUS; SUBCUTANEOUS at 18:14

## 2017-09-03 RX ADMIN — METHADONE HYDROCHLORIDE 10 MILLIGRAM(S): 40 TABLET ORAL at 12:54

## 2017-09-03 RX ADMIN — MAGNESIUM OXIDE 400 MG ORAL TABLET 400 MILLIGRAM(S): 241.3 TABLET ORAL at 10:32

## 2017-09-03 RX ADMIN — ZILEUTON 600 MILLIGRAM(S): 600 TABLET, MULTILAYER, EXTENDED RELEASE ORAL at 18:19

## 2017-09-03 RX ADMIN — HEPARIN SODIUM 5000 UNIT(S): 5000 INJECTION INTRAVENOUS; SUBCUTANEOUS at 05:21

## 2017-09-03 RX ADMIN — Medication 3 MILLILITER(S): at 00:07

## 2017-09-03 RX ADMIN — EZETIMIBE 10 MILLIGRAM(S): 10 TABLET ORAL at 12:37

## 2017-09-03 RX ADMIN — Medication 50 GRAM(S): at 01:16

## 2017-09-03 RX ADMIN — MAGNESIUM OXIDE 400 MG ORAL TABLET 400 MILLIGRAM(S): 241.3 TABLET ORAL at 18:13

## 2017-09-03 RX ADMIN — ZILEUTON 600 MILLIGRAM(S): 600 TABLET, MULTILAYER, EXTENDED RELEASE ORAL at 08:08

## 2017-09-03 RX ADMIN — METHADONE HYDROCHLORIDE 10 MILLIGRAM(S): 40 TABLET ORAL at 18:13

## 2017-09-03 RX ADMIN — SERTRALINE 50 MILLIGRAM(S): 25 TABLET, FILM COATED ORAL at 12:37

## 2017-09-03 RX ADMIN — TIOTROPIUM BROMIDE 1 CAPSULE(S): 18 CAPSULE ORAL; RESPIRATORY (INHALATION) at 12:37

## 2017-09-03 RX ADMIN — METHADONE HYDROCHLORIDE 10 MILLIGRAM(S): 40 TABLET ORAL at 09:06

## 2017-09-03 RX ADMIN — CLOPIDOGREL BISULFATE 75 MILLIGRAM(S): 75 TABLET, FILM COATED ORAL at 12:40

## 2017-09-03 NOTE — H&P ADULT - ASSESSMENT
NIGHT NIGHT HOSPITALIST:  Presentation of patient with apparently steroid dependent asthma by history, chronic pain syndrome and anxiety disorder, with cerebrovascular disease, likely has type 2 diabetes mellitus proper suspected steroid induced>>will repeat basic assay now with hyperglycemia and borderline AG.  Will place on Lantus when weight is available with S/S.  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  The possibility that Amitiza (for patient's hx of constipation) may exacerbate oropharyngeal pain or dyspnea will hold Amitiza for now.  Will HOLD the PPI with clear potential for hypomagnesemia.  Pepcid can have risk for QTc prolongation (likely from the low Mg++).  Will supplement K+ to 4.0.  Will also temporarily HOLD Dyazide with hyponatremia and hypomagnesemia.  Spouse agrees to a trial of low dose Oxycodone IR 5 mg BID PRN for breakthrough with patient's oral pain (no clear evidence of gingival or dental disease) with methadone only for chronic pain.  Patient has been on low dose Xanax for several months and will have to continue the low dose PRN to avoid withdrawal  (see [facility] ). NIGHT HOSPITALIST:  Presentation of patient with apparently steroid dependent asthma by history, chronic pain syndrome and anxiety disorder, with cerebrovascular disease, likely has type 2 diabetes mellitus proper suspected steroid induced>>will repeat basic assay now with hyperglycemia and borderline AG.  Will place on Lantus when weight is available with S/S.  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  The possibility that Amitiza (for patient's hx of constipation) may exacerbate oropharyngeal pain or dyspnea will hold Amitiza for now.  Will HOLD the PPI with clear potential for hypomagnesemia.  Pepcid can have risk for QTc prolongation (likely from the low Mg++).  Will supplement K+ to 4.0.  Will also temporarily HOLD Dyazide with hyponatremia and hypomagnesemia.  Spouse agrees to a trial of low dose Oxycodone IR 5 mg BID PRN for breakthrough with patient's oral pain (no clear evidence of gingival or dental disease) with methadone only for chronic pain.  Patient has been on low dose Xanax for several months and will have to continue the low dose PRN to avoid withdrawal  (see [place] ).    The patient's catabolic state is of concern, and the etiology is difficult to sort at present and may need to be reviewed with patient's outpatient physicians, but will start low dose Lantus in the morning (patient received no insulin in the ER for a plasma glucose of 313) to avoid excessive glucose toxicity and risk for DKA.  Will assume aspiration risk, but will provide a soft consistent carbohydrate diet, nutrition evaluation.  Patient's cognitive dysfunction from Alzheimer's has appears to have resulted in moderate to severe functional and cognitive impairment>>would consider evaluation by patient's neurologist.

## 2017-09-03 NOTE — H&P ADULT - NSHPPHYSICALEXAM_GEN_ALL_CORE
Physical exam with an elderly, cachectic, chronically ill appearing F with severe diffuse sarcopenia.  BP  128/88  Afebrile.  HR  76  RR 14.  99% on RA.  HEENT, PERRL, EOMI, bitemporal wasting.  Neck supple, chest grossly clear, cor s1 s2, refused breast exam.  Abdomen scaphoid, soft, nontender, no rebound, normal bowel sounds.  Ext with severe diffuse sarcopenia.  skin dry.  Neurologic exam as above:  Alert to self.  Needs no prompting.  Speech fluent but dependent upon spouse in attendance for interview.  UE/LE 5/5.

## 2017-09-03 NOTE — PATIENT PROFILE ADULT. - VISION (WITH CORRECTIVE LENSES IF THE PATIENT USUALLY WEARS THEM):
wears Eyeglasses/Partially impaired: cannot see medication labels or newsprint, but can see obstacles in path, and the surrounding layout; can count fingers at arm's length

## 2017-09-03 NOTE — H&P ADULT - PSH
H/O cataract extraction, left    H/O: hysterectomy    History of knee replacement    Humeral head fracture

## 2017-09-03 NOTE — H&P ADULT - NSHPLABSRESULTS_GEN_ALL_CORE
WBC 11.9.  Hgb 11.7.  Platelets of 360K.  INR 1.0.  Na+  130.  K+ 3.5.  Random glucose of 313.  HCO3 19.  Cr 0.7.  Borderline AG of 21.  lactate of 3.6. WBC 11.9.  Hgb 11.7.  Platelets of 360K.  INR 1.0.  Na+  130.  K+ 3.5.  Random glucose of 313.  HCO3 19.  Cr 0.7.  Borderline AG of 21.  lactate of 3.6.  alb 3.3. WBC 11.9.  Hgb 11.7.  Platelets of 360K.  INR 1.0.  Na+  130.  K+ 3.5.  Random glucose of 313.  HCO3 19.  Cr 0.7.  Borderline AG of 21.  lactate of 3.6.  alb 3.3.  Mg++ 0.9>>supplemented in the ER.  Chest radiograph reviewed with trace RIGHT pleural effusion but no infiltrate or effusion.  EKG tracing reviewed with ventricular rate at 95 with RBBB with Q II,III, F and Q V3-V6.  QTc 542 WBC 11.9.  Hgb 11.7.  Platelets of 360K.  INR 1.0.  Na+  130.  K+ 3.5.  Random glucose of 313.  HCO3 19.  Cr 0.7.  Borderline AG of 21.  lactate of 3.6.  alb 3.3.  Mg++ 0.9>>supplemented in the ER.  Chest radiograph reviewed with trace RIGHT pleural effusion but no infiltrate or effusion.  EKG tracing reviewed with ventricular rate at 95 with RBBB with Q II, III, F and Q V3-V6.  QTc 542.

## 2017-09-03 NOTE — H&P ADULT - PROBLEM SELECTOR PLAN 2
See above.  Steroid dependent asthma by history.  Will start conservative Lantus in the AM (see above) at 7 units (0.2 units/kg/24H).  Nutrition consult.

## 2017-09-03 NOTE — CONSULT NOTE ADULT - SUBJECTIVE AND OBJECTIVE BOX
[facility]  [phone]  NEPHROLOGY      INITIAL CONSULT NOTE  --------------------------------------------------------------------------------  HPI:    66 y/o F--patient  with moderate to severe cognitive dysfunction in the setting of Alzheimer's, known cerebrovascular disease with a stroke several months previously requiring hospitalization, reported asthma (apparently steroid dependent- on Prednisone 10 mg daily with spouse increased dose x 1 to 40 mg yesterday) on a leukotriene modifying agent, chronic anxiety disorder on several months of low dose Xanax, chronic pain syndrome from multi disc disease on methadone maintenance with a presumed mechanical fall one month ago with an ER evaluation with a LEFT proximal humeral head fracture (non-operative per ortho per spouse) with patient previously with splint but no longer with a splint but still non-weight bearing, with a 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 Cheek of neurology) with patient noting progressive worsening ot tingling of the mouth and tongue for the past 2 days with subjective worsening dyspnea.  Patient did not tolerate oral lidocaine prescribed by her cardiologist, nor the Gabapentin by her neurologist (the latter with increased sedation), and has been recently on Valtrex on presumed oral varicella infection.  Patient noted on ER assay to have severe hypomagnesemia hyponatremia and hypokalemia      PAST HISTORY  --------------------------------------------------------------------------------  PAST MEDICAL & SURGICAL HISTORY:  Fatty pancreas  PNA (pneumonia)  Pulmonary HTN  IGT (impaired glucose tolerance)  Ulcerative colitis  Acid reflux  Anxiety  Depression  Mouth sores  HLD (hyperlipidemia)  Asthma  Humeral head fracture  H/O: hysterectomy  H/O cataract extraction, left  History of knee replacement    FAMILY HISTORY:  pt unable to give  PAST SOCIAL HISTORY: no tobacco or alcohol    ALLERGIES & MEDICATIONS  --------------------------------------------------------------------------------  Allergies    ASA; dye contrast (Anaphylaxis)  aspirin (Short breath)  penicillin (Short breath)  sulfa drugs (Short breath)    Intolerances      Standing Inpatient Medications  predniSONE   Tablet 10 milliGRAM(s) Oral daily  methadone    Tablet 10 milliGRAM(s) Oral three times a day before meals  sertraline 50 milliGRAM(s) Oral daily  atorvastatin 20 milliGRAM(s) Oral at bedtime  clopidogrel Tablet 75 milliGRAM(s) Oral daily  tiotropium 18 MICROgram(s) Capsule 1 Capsule(s) Inhalation daily  calcitonin Nasal 1 Spray(s) Nasal daily  buDESOnide 160 MICROgram(s)/formoterol 4.5 MICROgram(s) Inhaler 2 Puff(s) Inhalation two times a day  ezetimibe 10 milliGRAM(s) Oral daily  zileuton 600 milliGRAM(s) Oral two times a day  insulin lispro (HumaLOG) corrective regimen sliding scale   SubCutaneous three times a day before meals  insulin lispro (HumaLOG) corrective regimen sliding scale   SubCutaneous at bedtime  dextrose 5%. 1000 milliLiter(s) IV Continuous <Continuous>  dextrose 50% Injectable 12.5 Gram(s) IV Push once  dextrose 50% Injectable 25 Gram(s) IV Push once  dextrose 50% Injectable 25 Gram(s) IV Push once  heparin  Injectable 5000 Unit(s) SubCutaneous every 12 hours  ALBUTerol    90 MICROgram(s) HFA Inhaler 1 Puff(s) Inhalation every 4 hours  insulin glargine Injectable (LANTUS) 7 Unit(s) SubCutaneous every morning  potassium chloride    Tablet ER 40 milliEquivalent(s) Oral every 4 hours    PRN Inpatient Medications  ondansetron Injectable 4 milliGRAM(s) IV Push every 12 hours PRN  ALPRAZolam 0.25 milliGRAM(s) Oral daily PRN  dextrose Gel 1 Dose(s) Oral once PRN  glucagon  Injectable 1 milliGRAM(s) IntraMuscular once PRN  oxyCODONE    IR 5 milliGRAM(s) Oral two times a day PRN  acetaminophen   Tablet. 650 milliGRAM(s) Oral every 6 hours PRN  ALBUTerol/ipratropium for Nebulization 3 milliLiter(s) Nebulizer every 6 hours PRN      REVIEW OF SYSTEMS  --------------------------------------------------------------------------------\  pt denies all ROS reliability is poor    Gen: No weight changes, fatigue, fevers/chills, weakness  Skin: No rashes  Head/Eyes/Ears/Mouth: No headache; Normal hearing; Normal vision w/o blurriness; No sinus pain/discomfort, sore throat. + states tingling in mouth is better.   Respiratory: No dyspnea, cough, wheezing, hemoptysis  CV: No chest pain, PND, orthopnea  GI: No abdominal pain, diarrhea, constipation, nausea, vomiting, melena, hematochezia  : No increased frequency, dysuria, hematuria, nocturia  Endo: No heat/cold intolerance  Psych: No significant nervousness, anxiety, stress, depression    All other systems were reviewed and are negative, except as noted.    VITALS/PHYSICAL EXAM  --------------------------------------------------------------------------------  T(C): 36.8 (09-03-17 @ 04:00), Max: 36.8 (09-03-17 @ 02:56)  HR: 93 (09-03-17 @ 04:00) (76 - 93)  BP: 130/79 (09-03-17 @ 04:00) (128/88 - 132/89)  RR: 18 (09-03-17 @ 04:00) (18 - 20)  SpO2: 100% (09-03-17 @ 04:00) (94% - 100%)  Wt(kg): --  Height (cm): 144.78 (09-03-17 @ 04:00)  Weight (kg): 37.9 (09-03-17 @ 04:00)  BMI (kg/m2): 18.1 (09-03-17 @ 04:00)  BSA (m2): 1.24 (09-03-17 @ 04:00)      09-02-17 @ 07:01  -  09-03-17 @ 07:00  --------------------------------------------------------  IN: 170 mL / OUT: 300 mL / NET: -130 mL      Physical Exam:  	Gen: oriented to self responsive   	no jvd no lip lesions noted  supple neck,   	Pulm: CTA B/L no rales or ronchi  	CV: RRR, S1S2; no rub  	Back: No spinal or CVA tenderness; no sacral edema  	Abd: +BS, soft, nontender/nondistended  	: No suprapubic tenderness  	LE: no clubbing, no cyanosis no edema  	Neuro oriented to self only   	    LABS/STUDIES  --------------------------------------------------------------------------------              10.2   13.6  >-----------<  344      [09-03-17 @ 06:39]              30.8     130  |  94  |  16  ----------------------------<  202      [09-03-17 @ 06:39]  3.0   |  20  |  0.55        Ca     7.6     [09-03-17 @ 06:39]      Mg     2.6     [09-03-17 @ 06:39]    TPro  5.5  /  Alb  3.3  /  TBili  0.6  /  DBili  x   /  AST  16  /  ALT  11  /  AlkPhos  90  [09-03-17 @ 00:00]    PT/INR: PT 11.5 , INR 1.05       [09-03-17 @ 00:00]  PTT: 36.8       [09-03-17 @ 00:00]    Troponin <0.01      [09-03-17 @ 06:39]  CK 29      [09-03-17 @ 06:39]    Creatinine Trend:  SCr 0.55 [09-03 @ 06:39]  SCr 0.75 [09-03 @ 00:00]        Iron 14, TIBC 182, %sat 8      [11-28-16 @ 07:50]  Ferritin 75.2      [11-28-16 @ 07:50]  HbA1c 6.9      [12-01-16 @ 07:26]

## 2017-09-03 NOTE — H&P ADULT - PROBLEM SELECTOR PLAN 4
Monitoring but not presently decompensated.  Will maintain on lower Prednisone 10 mg daily.  On calcitonin spray in the context of chronic steroid use.  ON Zyflo.

## 2017-09-03 NOTE — H&P ADULT - PROBLEM SELECTOR PLAN 1
Check Mg++ past supplementation.  PPI and Dyazide HELD.  Supplement K+ to 4.0.  Telemetry.  Follow QTc.

## 2017-09-03 NOTE — H&P ADULT - HISTORY OF PRESENT ILLNESS
NIGHT HOSPITALIST:  Patient UNKNOWN to me previously, assigned to me at this point via the ER and by Dr. 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 Cheek of neurology) with patient noting progressive worsening ot tingling of the mouth and tongue for the past 2 days with subjective worsening dyspnea.  Patient did not tolerate oral lidocaine prescribed by her cardiologist, nor the Gabapentin by her neurologist (the latter with increased sedation), and has been recently on Valtrex on presumed oral varicella infection.  Patient noted on ER assay to have severe hypomagnesemia.  History from spouse.  Patient is alert to self, repeats questions by examiner to spouse with patient dependent upon spouse for interview.  No requirement for prompting by examiner.  No chest pain/pressure.  No dyspnea, no wheezing at present.  No fever, no chills, no rigors.  No HA, no focal weakness.  NO LEFT arm pain.  No abdominal pain, no red blood per rectum or melena.  No back pain, no tearing back pain.  NO hematuria, no dysuria.  No cough.  Patient with anorexia and unspecified weight loss.  Remaining review of systems not contributory.

## 2017-09-03 NOTE — CONSULT NOTE ADULT - ASSESSMENT
66 yo F with Alz with cva asthma htn and ulcerative colitis with chronic back pain on pain meds along with GERD  1- hypomagnesemia  2- hyponatremia  3- htn  4- hypocalcemia  5- chronic back pain       suspect multifactorial etiologies of her electrolyte imbalance including dyazide PPI and hypomagnesemia leading to hypocalcemia and hypokalemia  replete mag  start mag oxide 400mg bid and resume PPI in am   dc dyazide. use norvasc 5 mg for bp control  start oscal one tab bid   replete kcl   repeat lytes after repletion

## 2017-09-03 NOTE — H&P ADULT - PROBLEM SELECTOR PLAN 3
See above.  Presumed idiopathic but will have the DAY PROVIDER review the Valtrex and Neurontin, as well as the Amitiza.  Consider evaluation by patient's neurologist.  Spouse agrees to trial of low dose Oxycodone IR 5 mg for acute pain with patient's chronic methadone 30 mg daily.

## 2017-09-04 ENCOUNTER — TRANSCRIPTION ENCOUNTER (OUTPATIENT)
Age: 67
End: 2017-09-04

## 2017-09-04 VITALS — WEIGHT: 117.95 LBS

## 2017-09-04 LAB
ALBUMIN SERPL ELPH-MCNC: 3 G/DL — LOW (ref 3.3–5)
ALP SERPL-CCNC: 70 U/L — SIGNIFICANT CHANGE UP (ref 40–120)
ALT FLD-CCNC: 12 U/L RC — SIGNIFICANT CHANGE UP (ref 10–45)
ANION GAP SERPL CALC-SCNC: 12 MMOL/L — SIGNIFICANT CHANGE UP (ref 5–17)
AST SERPL-CCNC: 16 U/L — SIGNIFICANT CHANGE UP (ref 10–40)
BILIRUB SERPL-MCNC: 0.4 MG/DL — SIGNIFICANT CHANGE UP (ref 0.2–1.2)
BUN SERPL-MCNC: 15 MG/DL — SIGNIFICANT CHANGE UP (ref 7–23)
CALCIUM SERPL-MCNC: 8 MG/DL — LOW (ref 8.4–10.5)
CHLORIDE SERPL-SCNC: 101 MMOL/L — SIGNIFICANT CHANGE UP (ref 96–108)
CO2 SERPL-SCNC: 24 MMOL/L — SIGNIFICANT CHANGE UP (ref 22–31)
CREAT SERPL-MCNC: 0.45 MG/DL — LOW (ref 0.5–1.3)
GAS PNL BLDV: SIGNIFICANT CHANGE UP
GLUCOSE SERPL-MCNC: 63 MG/DL — LOW (ref 70–99)
HBA1C BLD-MCNC: 5.8 % — HIGH (ref 4–5.6)
HCT VFR BLD CALC: 29.9 % — LOW (ref 34.5–45)
HGB BLD-MCNC: 9.6 G/DL — LOW (ref 11.5–15.5)
MAGNESIUM SERPL-MCNC: 1.8 MG/DL — SIGNIFICANT CHANGE UP (ref 1.6–2.6)
MCHC RBC-ENTMCNC: 32.2 GM/DL — SIGNIFICANT CHANGE UP (ref 32–36)
MCHC RBC-ENTMCNC: 34 PG — SIGNIFICANT CHANGE UP (ref 27–34)
MCV RBC AUTO: 106 FL — HIGH (ref 80–100)
PHOSPHATE SERPL-MCNC: 2.5 MG/DL — SIGNIFICANT CHANGE UP (ref 2.5–4.5)
PLATELET # BLD AUTO: 312 K/UL — SIGNIFICANT CHANGE UP (ref 150–400)
POTASSIUM SERPL-MCNC: 4.2 MMOL/L — SIGNIFICANT CHANGE UP (ref 3.5–5.3)
POTASSIUM SERPL-SCNC: 4.2 MMOL/L — SIGNIFICANT CHANGE UP (ref 3.5–5.3)
PROT SERPL-MCNC: 4.7 G/DL — LOW (ref 6–8.3)
RBC # BLD: 2.83 M/UL — LOW (ref 3.8–5.2)
RBC # FLD: 15.7 % — HIGH (ref 10.3–14.5)
SODIUM SERPL-SCNC: 137 MMOL/L — SIGNIFICANT CHANGE UP (ref 135–145)
WBC # BLD: 8.7 K/UL — SIGNIFICANT CHANGE UP (ref 3.8–10.5)
WBC # FLD AUTO: 8.7 K/UL — SIGNIFICANT CHANGE UP (ref 3.8–10.5)

## 2017-09-04 PROCEDURE — 82435 ASSAY OF BLOOD CHLORIDE: CPT

## 2017-09-04 PROCEDURE — 99285 EMERGENCY DEPT VISIT HI MDM: CPT | Mod: 25

## 2017-09-04 PROCEDURE — 82803 BLOOD GASES ANY COMBINATION: CPT

## 2017-09-04 PROCEDURE — 80048 BASIC METABOLIC PNL TOTAL CA: CPT

## 2017-09-04 PROCEDURE — 80053 COMPREHEN METABOLIC PANEL: CPT

## 2017-09-04 PROCEDURE — 84132 ASSAY OF SERUM POTASSIUM: CPT

## 2017-09-04 PROCEDURE — 85730 THROMBOPLASTIN TIME PARTIAL: CPT

## 2017-09-04 PROCEDURE — 83735 ASSAY OF MAGNESIUM: CPT

## 2017-09-04 PROCEDURE — 84100 ASSAY OF PHOSPHORUS: CPT

## 2017-09-04 PROCEDURE — 96374 THER/PROPH/DIAG INJ IV PUSH: CPT

## 2017-09-04 PROCEDURE — 84443 ASSAY THYROID STIM HORMONE: CPT

## 2017-09-04 PROCEDURE — 87186 SC STD MICRODIL/AGAR DIL: CPT

## 2017-09-04 PROCEDURE — 82553 CREATINE MB FRACTION: CPT

## 2017-09-04 PROCEDURE — 36600 WITHDRAWAL OF ARTERIAL BLOOD: CPT

## 2017-09-04 PROCEDURE — 82330 ASSAY OF CALCIUM: CPT

## 2017-09-04 PROCEDURE — 82550 ASSAY OF CK (CPK): CPT

## 2017-09-04 PROCEDURE — 94640 AIRWAY INHALATION TREATMENT: CPT

## 2017-09-04 PROCEDURE — 71046 X-RAY EXAM CHEST 2 VIEWS: CPT

## 2017-09-04 PROCEDURE — 82947 ASSAY GLUCOSE BLOOD QUANT: CPT

## 2017-09-04 PROCEDURE — 81003 URINALYSIS AUTO W/O SCOPE: CPT

## 2017-09-04 PROCEDURE — 83036 HEMOGLOBIN GLYCOSYLATED A1C: CPT

## 2017-09-04 PROCEDURE — 84295 ASSAY OF SERUM SODIUM: CPT

## 2017-09-04 PROCEDURE — 83605 ASSAY OF LACTIC ACID: CPT

## 2017-09-04 PROCEDURE — 87086 URINE CULTURE/COLONY COUNT: CPT

## 2017-09-04 PROCEDURE — 84484 ASSAY OF TROPONIN QUANT: CPT

## 2017-09-04 PROCEDURE — 85610 PROTHROMBIN TIME: CPT

## 2017-09-04 PROCEDURE — 93005 ELECTROCARDIOGRAM TRACING: CPT

## 2017-09-04 PROCEDURE — 85014 HEMATOCRIT: CPT

## 2017-09-04 PROCEDURE — 85027 COMPLETE CBC AUTOMATED: CPT

## 2017-09-04 RX ORDER — DEXTROSE 50 % IN WATER 50 %
1 SYRINGE (ML) INTRAVENOUS ONCE
Qty: 0 | Refills: 0 | Status: COMPLETED | OUTPATIENT
Start: 2017-09-04 | End: 2017-09-04

## 2017-09-04 RX ORDER — ZILEUTON 600 MG/1
600 TABLET, MULTILAYER, EXTENDED RELEASE ORAL ONCE
Qty: 0 | Refills: 0 | Status: COMPLETED | OUTPATIENT
Start: 2017-09-04 | End: 2017-09-04

## 2017-09-04 RX ORDER — ONDANSETRON 8 MG/1
0 TABLET, FILM COATED ORAL
Qty: 0 | Refills: 0 | COMMUNITY

## 2017-09-04 RX ORDER — DEXTROSE 50 % IN WATER 50 %
25 SYRINGE (ML) INTRAVENOUS ONCE
Qty: 0 | Refills: 0 | Status: COMPLETED | OUTPATIENT
Start: 2017-09-04 | End: 2017-09-04

## 2017-09-04 RX ORDER — FLUTICASONE PROPIONATE AND SALMETEROL 50; 250 UG/1; UG/1
2 POWDER ORAL; RESPIRATORY (INHALATION)
Qty: 0 | Refills: 0 | COMMUNITY
Start: 2017-09-04

## 2017-09-04 RX ORDER — MAGNESIUM OXIDE 400 MG ORAL TABLET 241.3 MG
1 TABLET ORAL
Qty: 60 | Refills: 0 | OUTPATIENT
Start: 2017-09-04 | End: 2017-10-04

## 2017-09-04 RX ORDER — FLUTICASONE PROPIONATE AND SALMETEROL 50; 250 UG/1; UG/1
1 POWDER ORAL; RESPIRATORY (INHALATION)
Qty: 0 | Refills: 0 | COMMUNITY
Start: 2017-09-04

## 2017-09-04 RX ORDER — MAGNESIUM OXIDE 400 MG ORAL TABLET 241.3 MG
1 TABLET ORAL
Qty: 0 | Refills: 0 | COMMUNITY
Start: 2017-09-04

## 2017-09-04 RX ORDER — PANTOPRAZOLE SODIUM 20 MG/1
1 TABLET, DELAYED RELEASE ORAL
Qty: 0 | Refills: 0 | COMMUNITY
Start: 2017-09-04

## 2017-09-04 RX ADMIN — Medication 10 MILLIGRAM(S): at 06:43

## 2017-09-04 RX ADMIN — BUDESONIDE AND FORMOTEROL FUMARATE DIHYDRATE 2 PUFF(S): 160; 4.5 AEROSOL RESPIRATORY (INHALATION) at 06:43

## 2017-09-04 RX ADMIN — ZILEUTON 600 MILLIGRAM(S): 600 TABLET, MULTILAYER, EXTENDED RELEASE ORAL at 17:08

## 2017-09-04 RX ADMIN — HEPARIN SODIUM 5000 UNIT(S): 5000 INJECTION INTRAVENOUS; SUBCUTANEOUS at 06:42

## 2017-09-04 RX ADMIN — MAGNESIUM OXIDE 400 MG ORAL TABLET 400 MILLIGRAM(S): 241.3 TABLET ORAL at 09:28

## 2017-09-04 RX ADMIN — CLOPIDOGREL BISULFATE 75 MILLIGRAM(S): 75 TABLET, FILM COATED ORAL at 09:28

## 2017-09-04 RX ADMIN — METHADONE HYDROCHLORIDE 10 MILLIGRAM(S): 40 TABLET ORAL at 06:43

## 2017-09-04 RX ADMIN — TIOTROPIUM BROMIDE 1 CAPSULE(S): 18 CAPSULE ORAL; RESPIRATORY (INHALATION) at 09:28

## 2017-09-04 RX ADMIN — SERTRALINE 50 MILLIGRAM(S): 25 TABLET, FILM COATED ORAL at 09:28

## 2017-09-04 RX ADMIN — CALCITONIN SALMON 1 SPRAY(S): 200 INJECTION, SOLUTION INTRAMUSCULAR at 14:34

## 2017-09-04 RX ADMIN — METHADONE HYDROCHLORIDE 10 MILLIGRAM(S): 40 TABLET ORAL at 13:45

## 2017-09-04 RX ADMIN — EZETIMIBE 10 MILLIGRAM(S): 10 TABLET ORAL at 09:28

## 2017-09-04 RX ADMIN — ZILEUTON 600 MILLIGRAM(S): 600 TABLET, MULTILAYER, EXTENDED RELEASE ORAL at 06:44

## 2017-09-04 RX ADMIN — ZILEUTON 600 MILLIGRAM(S): 600 TABLET, MULTILAYER, EXTENDED RELEASE ORAL at 07:37

## 2017-09-04 RX ADMIN — Medication 1 DOSE(S): at 08:30

## 2017-09-04 RX ADMIN — MAGNESIUM OXIDE 400 MG ORAL TABLET 400 MILLIGRAM(S): 241.3 TABLET ORAL at 17:08

## 2017-09-04 NOTE — PROGRESS NOTE ADULT - SUBJECTIVE AND OBJECTIVE BOX
Notify by RN patient with finger stick this morning 65 repeat 66   glucose protocol follow  increase to 144   Seen and examined patient asymptomatic  Denies cp, sob, vomiting, nausea, fever, chills, abdominal pain.     Patient is a 67y old  Female who presents with a chief complaint of Worsening of tingling of mouth and tongue with subjective dyspnea for the past 2 days. (03 Sep 2017 03:49)      SUBJECTIVE / OVERNIGHT EVENTS:    MEDICATIONS  (STANDING):  predniSONE   Tablet 10 milliGRAM(s) Oral daily  methadone    Tablet 10 milliGRAM(s) Oral three times a day before meals  sertraline 50 milliGRAM(s) Oral daily  atorvastatin 20 milliGRAM(s) Oral at bedtime  clopidogrel Tablet 75 milliGRAM(s) Oral daily  tiotropium 18 MICROgram(s) Capsule 1 Capsule(s) Inhalation daily  calcitonin Nasal 1 Spray(s) Nasal daily  buDESOnide 160 MICROgram(s)/formoterol 4.5 MICROgram(s) Inhaler 2 Puff(s) Inhalation two times a day  ezetimibe 10 milliGRAM(s) Oral daily  zileuton 600 milliGRAM(s) Oral two times a day  insulin lispro (HumaLOG) corrective regimen sliding scale   SubCutaneous at bedtime  dextrose 5%. 1000 milliLiter(s) (50 mL/Hr) IV Continuous <Continuous>  dextrose 50% Injectable 12.5 Gram(s) IV Push once  dextrose 50% Injectable 25 Gram(s) IV Push once  dextrose 50% Injectable 25 Gram(s) IV Push once  heparin  Injectable 5000 Unit(s) SubCutaneous every 12 hours  ALBUTerol    90 MICROgram(s) HFA Inhaler 1 Puff(s) Inhalation every 4 hours  magnesium oxide 400 milliGRAM(s) Oral two times a day with meals    MEDICATIONS  (PRN):  ondansetron Injectable 4 milliGRAM(s) IV Push every 12 hours PRN Nausea  ALPRAZolam 0.25 milliGRAM(s) Oral daily PRN anxiety  dextrose Gel 1 Dose(s) Oral once PRN Blood Glucose LESS THAN 70 milliGRAM(s)/deciliter  glucagon  Injectable 1 milliGRAM(s) IntraMuscular once PRN Glucose LESS THAN 70 milligrams/deciliter  oxyCODONE    IR 5 milliGRAM(s) Oral two times a day PRN Moderate Pain (4 - 6)  acetaminophen   Tablet. 650 milliGRAM(s) Oral every 6 hours PRN Mild Pain (1 - 3)  ALBUTerol/ipratropium for Nebulization 3 milliLiter(s) Nebulizer every 6 hours PRN Wheezing        CAPILLARY BLOOD GLUCOSE  201 (04 Sep 2017 12:54)  194 (04 Sep 2017 11:12)  144 (04 Sep 2017 09:33)  94 (04 Sep 2017 09:06)  67 (04 Sep 2017 08:29)  65 (04 Sep 2017 08:07)  68 (04 Sep 2017 08:00)  109 (03 Sep 2017 21:27)  71 (03 Sep 2017 17:51)        I&O's Summary    03 Sep 2017 07:01  -  04 Sep 2017 07:00  --------------------------------------------------------  IN: 410 mL / OUT: 750 mL / NET: -340 mL    04 Sep 2017 07:01  -  04 Sep 2017 15:29  --------------------------------------------------------  IN: 540 mL / OUT: 1100 mL / NET: -560 mL        LABS:                        9.6    8.7   )-----------( 312      ( 04 Sep 2017 06:15 )             29.9     09-04    137  |  101  |  15  ----------------------------<  63<L>  4.2   |  24  |  0.45<L>    Ca    8.0<L>      04 Sep 2017 06:15  Phos  2.5     09-04  Mg     1.8     09-04    TPro  4.7<L>  /  Alb  3.0<L>  /  TBili  0.4  /  DBili  x   /  AST  16  /  ALT  12  /  AlkPhos  70  09-04    PT/INR - ( 03 Sep 2017 00:00 )   PT: 11.5 sec;   INR: 1.05 ratio         PTT - ( 03 Sep 2017 00:00 )  PTT:36.8 sec  CARDIAC MARKERS ( 04 Sep 2017 06:15 )  x     / x     / 37 U/L / x     / x      CARDIAC MARKERS ( 03 Sep 2017 21:59 )  x     / <0.01 ng/mL / x     / x     / 4.1 ng/mL  CARDIAC MARKERS ( 03 Sep 2017 15:03 )  x     / <0.01 ng/mL / x     / x     / 3.1 ng/mL  CARDIAC MARKERS ( 03 Sep 2017 06:39 )  x     / <0.01 ng/mL / 29 U/L / x     / 2.5 ng/mL      Urinalysis Basic - ( 03 Sep 2017 12:35 )    Color: x / Appearance: Clear / S.011 / pH: x  Gluc: x / Ketone: Negative  / Bili: Negative / Urobili: 2   Blood: x / Protein: Negative / Nitrite: Negative   Leuk Esterase: Negative / RBC: x / WBC x   Sq Epi: x / Non Sq Epi: x / Bacteria: x      ABG - ( 03 Sep 2017 13:40 )  pH: 7.51  /  pCO2: 28    /  pO2: 114   / HCO3: 22    / Base Excess: .4    /  SaO2: 99                  RADIOLOGY & ADDITIONAL TESTS:    PHYSICAL EXAM:  GENERAL: NAD, well-developed  HEAD:  Atraumatic, Normocephalic  EYES: EOMI, PERRLA, conjunctiva and sclera clear  NECK: Supple, No JVD  CHEST/LUNG: Clear to auscultation bilaterally; No wheeze  HEART: Regular rate and rhythm; No murmurs, rubs, or gallops  ABDOMEN: Soft, Nontender, Nondistended; Bowel sounds present  EXTREMITIES:  2+ Peripheral Pulses, No clubbing, cyanosis, or edema  PSYCH: AAOx3  NEUROLOGY: non-focal  SKIN: No rashes or lesions        A/P:  PAST MEDICAL & SURGICAL HISTORY:  Fatty pancreas  PNA (pneumonia)  Pulmonary HTN  IGT (impaired glucose tolerance)  Ulcerative colitis  Acid reflux  Anxiety  Depression  Mouth sores  HLD (hyperlipidemia)  Asthma  Humeral head fracture  H/O: hysterectomy  H/O cataract extraction, left  History of knee replacement ·	Notify by RN patient with finger stick this morning 68 repeat 65 follow protocol 67   glucose protocol follow  increase to 144   Seen and examined patient asymptomatic  Denies cp, sob, vomiting, nausea, fever, chills, abdominal pain.     Patient is a 67y old  Female who presents with a chief complaint of Worsening of tingling of mouth and tongue with subjective dyspnea for the past 2 days. (03 Sep 2017 03:49)      SUBJECTIVE / OVERNIGHT EVENTS:    MEDICATIONS  (STANDING):  predniSONE   Tablet 10 milliGRAM(s) Oral daily  methadone    Tablet 10 milliGRAM(s) Oral three times a day before meals  sertraline 50 milliGRAM(s) Oral daily  atorvastatin 20 milliGRAM(s) Oral at bedtime  clopidogrel Tablet 75 milliGRAM(s) Oral daily  tiotropium 18 MICROgram(s) Capsule 1 Capsule(s) Inhalation daily  calcitonin Nasal 1 Spray(s) Nasal daily  buDESOnide 160 MICROgram(s)/formoterol 4.5 MICROgram(s) Inhaler 2 Puff(s) Inhalation two times a day  ezetimibe 10 milliGRAM(s) Oral daily  zileuton 600 milliGRAM(s) Oral two times a day  insulin lispro (HumaLOG) corrective regimen sliding scale   SubCutaneous at bedtime  dextrose 5%. 1000 milliLiter(s) (50 mL/Hr) IV Continuous <Continuous>  dextrose 50% Injectable 12.5 Gram(s) IV Push once  dextrose 50% Injectable 25 Gram(s) IV Push once  dextrose 50% Injectable 25 Gram(s) IV Push once  heparin  Injectable 5000 Unit(s) SubCutaneous every 12 hours  ALBUTerol    90 MICROgram(s) HFA Inhaler 1 Puff(s) Inhalation every 4 hours  magnesium oxide 400 milliGRAM(s) Oral two times a day with meals    MEDICATIONS  (PRN):  ondansetron Injectable 4 milliGRAM(s) IV Push every 12 hours PRN Nausea  ALPRAZolam 0.25 milliGRAM(s) Oral daily PRN anxiety  dextrose Gel 1 Dose(s) Oral once PRN Blood Glucose LESS THAN 70 milliGRAM(s)/deciliter  glucagon  Injectable 1 milliGRAM(s) IntraMuscular once PRN Glucose LESS THAN 70 milligrams/deciliter  oxyCODONE    IR 5 milliGRAM(s) Oral two times a day PRN Moderate Pain (4 - 6)  acetaminophen   Tablet. 650 milliGRAM(s) Oral every 6 hours PRN Mild Pain (1 - 3)  ALBUTerol/ipratropium for Nebulization 3 milliLiter(s) Nebulizer every 6 hours PRN Wheezing        CAPILLARY BLOOD GLUCOSE  201 (04 Sep 2017 12:54)  194 (04 Sep 2017 11:12)  144 (04 Sep 2017 09:33)  94 (04 Sep 2017 09:06)  67 (04 Sep 2017 08:29)  65 (04 Sep 2017 08:07)  68 (04 Sep 2017 08:00)  109 (03 Sep 2017 21:27)  71 (03 Sep 2017 17:51)        I&O's Summary    03 Sep 2017 07:01  -  04 Sep 2017 07:00  --------------------------------------------------------  IN: 410 mL / OUT: 750 mL / NET: -340 mL    04 Sep 2017 07:01  -  04 Sep 2017 15:29  --------------------------------------------------------  IN: 540 mL / OUT: 1100 mL / NET: -560 mL        LABS:                        9.6    8.7   )-----------( 312      ( 04 Sep 2017 06:15 )             29.9     09-04    137  |  101  |  15  ----------------------------<  63<L>  4.2   |  24  |  0.45<L>    Ca    8.0<L>      04 Sep 2017 06:15  Phos  2.5     09-04  Mg     1.8     09-04    TPro  4.7<L>  /  Alb  3.0<L>  /  TBili  0.4  /  DBili  x   /  AST  16  /  ALT  12  /  AlkPhos  70  09-04    PT/INR - ( 03 Sep 2017 00:00 )   PT: 11.5 sec;   INR: 1.05 ratio         PTT - ( 03 Sep 2017 00:00 )  PTT:36.8 sec  CARDIAC MARKERS ( 04 Sep 2017 06:15 )  x     / x     / 37 U/L / x     / x      CARDIAC MARKERS ( 03 Sep 2017 21:59 )  x     / <0.01 ng/mL / x     / x     / 4.1 ng/mL  CARDIAC MARKERS ( 03 Sep 2017 15:03 )  x     / <0.01 ng/mL / x     / x     / 3.1 ng/mL  CARDIAC MARKERS ( 03 Sep 2017 06:39 )  x     / <0.01 ng/mL / 29 U/L / x     / 2.5 ng/mL      Urinalysis Basic - ( 03 Sep 2017 12:35 )    Color: x / Appearance: Clear / S.011 / pH: x  Gluc: x / Ketone: Negative  / Bili: Negative / Urobili: 2   Blood: x / Protein: Negative / Nitrite: Negative   Leuk Esterase: Negative / RBC: x / WBC x   Sq Epi: x / Non Sq Epi: x / Bacteria: x      ABG - ( 03 Sep 2017 13:40 )  pH: 7.51  /  pCO2: 28    /  pO2: 114   / HCO3: 22    / Base Excess: .4    /  SaO2: 99                  RADIOLOGY & ADDITIONAL TESTS:    PHYSICAL EXAM:  GENERAL: NAD, well-developed  HEAD:  Atraumatic, Normocephalic  EYES: EOMI, PERRLA, conjunctiva and sclera clear  NECK: Supple, No JVD  CHEST/LUNG: Clear to auscultation bilaterally; No wheeze  HEART: Regular rate and rhythm; No murmurs, rubs, or gallops  ABDOMEN: Soft, Nontender, Nondistended; Bowel sounds present  EXTREMITIES:  2+ Peripheral Pulses, No clubbing, cyanosis, or edema  PSYCH: AAOx3  NEUROLOGY: non-focal  SKIN: No rashes or lesions        A/P:  PAST MEDICAL & SURGICAL HISTORY:  Fatty pancreas  PNA (pneumonia)  Pulmonary HTN  IGT (impaired glucose tolerance)  Ulcerative colitis  Acid reflux  Anxiety  Depression  Mouth sores  HLD (hyperlipidemia)  Asthma  Humeral head fracture  H/O: hysterectomy  H/O cataract extraction, left  History of knee replacement

## 2017-09-04 NOTE — DISCHARGE NOTE ADULT - CARE PROVIDERS DIRECT ADDRESSES
,DirectAddress_Unknown,[email or web address],DirectAddress_Unknown

## 2017-09-04 NOTE — DISCHARGE NOTE ADULT - CARE PROVIDER_API CALL
Johana Cheek), Neurology  [address] [place] [address]  Phone: [phone]  Fax: [phone]    Michael Leiva), Cardiovascular Disease; Internal Medicine  [address] [facility]  [address]  [place] [address]  Phone: [phone]  Fax: [phone]    Carlito Francis (), Internal Medicine; Pulmonary Disease  [address] [facility]  [address]  [place] [address]  Phone: [phone]  Fax: [phone]

## 2017-09-04 NOTE — DISCHARGE NOTE ADULT - SECONDARY DIAGNOSIS.
Electrolyte abnormality Burning mouth syndrome Moderate persistent asthma without complication Anxiety Hyperlipidemia, unspecified hyperlipidemia type

## 2017-09-04 NOTE — DISCHARGE NOTE ADULT - PLAN OF CARE
resolved follow up with BMP please follow up with BMP with PMD outpatient   follow up with 1-2 week  discharge on magnesium 400mg oral twice daily please follow up with PMD for BMP outpatient Call your Health Care provider upon arrival home to make a follow up appointment within one week.  Take all inhalers as prescribed by your Health Care Provider.  Take steroids as prescribed by your Health Care Provider.  If your cough increases infrequency and severity and/or you have shortness of breath or increased shortness of breath call your Health Care Provider.  If you develop fever, chills, night sweats, malaise, and/or change in mental status call your Health care Provider.  Nutrition is very important.  Eat small frequent meals.  Increase your activity as tolerated.  Do not stay in bed all day continue with home medication follow up with BMP  discussed with Dr Cheri Fagan Call your Health Care provider upon arrival home to make a follow up appointment within one week.  Take all inhalers as prescribed by your Health Care Provider.    Take steroids as prescribed by your Health Care Provider.  If your cough increases infrequency and severity and/or you have shortness of breath or increased shortness of breath call your Health Care Provider.  If you develop fever, chills, night sweats, malaise, and/or change in mental status call your Health care Provider.  Nutrition is very important.  Eat small frequent meals.  Increase your activity as tolerated.  Do not stay in bed all day

## 2017-09-04 NOTE — PROVIDER CONTACT NOTE (OTHER) - ACTION/TREATMENT ORDERED:
Follow hypoglycemic protocol, 4oz of apple juice given. FS rechecked in 15 min. Follow protocol until FS>100. Dr. Steiner made aware, hold AM Brigida.

## 2017-09-04 NOTE — DIETITIAN INITIAL EVALUATION ADULT. - NS AS NUTRI INTERV MEALS SNACK
1) Recommend liberalize diet in setting of decreased po intake, underwt status. Pt denies intolerance to specific foods at this time, denies specific meal preferences./General/healthful diet

## 2017-09-04 NOTE — DISCHARGE NOTE ADULT - CARE PLAN
Principal Discharge DX:	Hypomagnesemia  Goal:	resolved  Instructions for follow-up, activity and diet:	please follow up with BMP with PMD outpatient   follow up with 1-2 week  discharge on magnesium 400mg oral twice daily please follow up with PMD for BMP outpatient  Secondary Diagnosis:	Electrolyte abnormality  Instructions for follow-up, activity and diet:	follow up with BMP  Secondary Diagnosis:	Burning mouth syndrome  Secondary Diagnosis:	Moderate persistent asthma without complication  Instructions for follow-up, activity and diet:	Call your Health Care provider upon arrival home to make a follow up appointment within one week.  Take all inhalers as prescribed by your Health Care Provider.  Take steroids as prescribed by your Health Care Provider.  If your cough increases infrequency and severity and/or you have shortness of breath or increased shortness of breath call your Health Care Provider.  If you develop fever, chills, night sweats, malaise, and/or change in mental status call your Health care Provider.  Nutrition is very important.  Eat small frequent meals.  Increase your activity as tolerated.  Do not stay in bed all day  Secondary Diagnosis:	Anxiety  Instructions for follow-up, activity and diet:	continue with home medication  Secondary Diagnosis:	Hyperlipidemia, unspecified hyperlipidemia type  Instructions for follow-up, activity and diet:	continue with home medication Principal Discharge DX:	Hypomagnesemia  Goal:	resolved  Instructions for follow-up, activity and diet:	please follow up with BMP with PMD outpatient   follow up with 1-2 week  discharge on magnesium 400mg oral twice daily please follow up with PMD for BMP outpatient  Secondary Diagnosis:	Electrolyte abnormality  Instructions for follow-up, activity and diet:	follow up with BMP  discussed with Dr Cheri Fagan  Secondary Diagnosis:	Burning mouth syndrome  Instructions for follow-up, activity and diet:	continue with home medication  Secondary Diagnosis:	Moderate persistent asthma without complication  Instructions for follow-up, activity and diet:	Call your Health Care provider upon arrival home to make a follow up appointment within one week.  Take all inhalers as prescribed by your Health Care Provider.    Take steroids as prescribed by your Health Care Provider.  If your cough increases infrequency and severity and/or you have shortness of breath or increased shortness of breath call your Health Care Provider.  If you develop fever, chills, night sweats, malaise, and/or change in mental status call your Health care Provider.  Nutrition is very important.  Eat small frequent meals.  Increase your activity as tolerated.  Do not stay in bed all day  Secondary Diagnosis:	Anxiety  Instructions for follow-up, activity and diet:	continue with home medication  Secondary Diagnosis:	Hyperlipidemia, unspecified hyperlipidemia type  Instructions for follow-up, activity and diet:	continue with home medication

## 2017-09-04 NOTE — PROGRESS NOTE ADULT - SUBJECTIVE AND OBJECTIVE BOX
PULMONARY PROGRESS NOTE    MYRIAM HEATH  MRN-[id number]    Patient is a 67y old  Female who presents with a chief complaint of Worsening of tingling of mouth and tongue with subjective dyspnea for the past 2 days. (03 Sep 2017 03:49)      HPI:  -Breathing comfortably, no chest pain or wheeze.    ACTIVE MEDICATIONS:  MEDICATIONS  (STANDING):  predniSONE   Tablet 10 milliGRAM(s) Oral daily  methadone    Tablet 10 milliGRAM(s) Oral three times a day before meals  sertraline 50 milliGRAM(s) Oral daily  atorvastatin 20 milliGRAM(s) Oral at bedtime  clopidogrel Tablet 75 milliGRAM(s) Oral daily  tiotropium 18 MICROgram(s) Capsule 1 Capsule(s) Inhalation daily  calcitonin Nasal 1 Spray(s) Nasal daily  buDESOnide 160 MICROgram(s)/formoterol 4.5 MICROgram(s) Inhaler 2 Puff(s) Inhalation two times a day  ezetimibe 10 milliGRAM(s) Oral daily  zileuton 600 milliGRAM(s) Oral two times a day  insulin lispro (HumaLOG) corrective regimen sliding scale   SubCutaneous at bedtime  dextrose 5%. 1000 milliLiter(s) (50 mL/Hr) IV Continuous <Continuous>  dextrose 50% Injectable 12.5 Gram(s) IV Push once  dextrose 50% Injectable 25 Gram(s) IV Push once  dextrose 50% Injectable 25 Gram(s) IV Push once  heparin  Injectable 5000 Unit(s) SubCutaneous every 12 hours  ALBUTerol    90 MICROgram(s) HFA Inhaler 1 Puff(s) Inhalation every 4 hours  magnesium oxide 400 milliGRAM(s) Oral two times a day with meals    MEDICATIONS  (PRN):  ondansetron Injectable 4 milliGRAM(s) IV Push every 12 hours PRN Nausea  ALPRAZolam 0.25 milliGRAM(s) Oral daily PRN anxiety  dextrose Gel 1 Dose(s) Oral once PRN Blood Glucose LESS THAN 70 milliGRAM(s)/deciliter  glucagon  Injectable 1 milliGRAM(s) IntraMuscular once PRN Glucose LESS THAN 70 milligrams/deciliter  oxyCODONE    IR 5 milliGRAM(s) Oral two times a day PRN Moderate Pain (4 - 6)  acetaminophen   Tablet. 650 milliGRAM(s) Oral every 6 hours PRN Mild Pain (1 - 3)  ALBUTerol/ipratropium for Nebulization 3 milliLiter(s) Nebulizer every 6 hours PRN Wheezing      EXAM:  Vital Signs Last 24 Hrs  T(C): 36.8 (04 Sep 2017 03:50), Max: 36.8 (04 Sep 2017 03:50)  T(F): 98.3 (04 Sep 2017 03:50), Max: 98.3 (04 Sep 2017 03:50)  HR: 84 (04 Sep 2017 03:50) (83 - 89)  BP: 129/68 (04 Sep 2017 03:50) (116/70 - 136/80)  BP(mean): --  RR: 16 (04 Sep 2017 03:50) (16 - 17)  SpO2: 99% (04 Sep 2017 03:50) (99% - 100%)    LUNGS: Clear to auscultation without wheezing, rales or rhonchi; respirations unlabored    LABS/IMAGING: reviewed  09-04    137  |  101  |  15  ----------------------------<  63<L>  4.2   |  24  |  0.45<L>    Ca    8.0<L>      04 Sep 2017 06:15  Phos  2.5     09-04  Mg     1.8     09-04    TPro  4.7<L>  /  Alb  3.0<L>  /  TBili  0.4  /  DBili  x   /  AST  16  /  ALT  12  /  AlkPhos  70  09-04    < from: Xray Chest 2 Views PA/Lat (09.02.17 @ 23:41) >  MPRESSION:   Trace right pleural effusion. No focal consolidation.    < end of copied text >        PROBLEM LIST:  67y Female with HEALTH ISSUES - PROBLEM Dx:  Humeral fracture: Humeral fracture  Alzheimer's disease of other onset: Alzheimer&#x27;s disease of other onset  Asthma: Asthma  Burning mouth syndrome: Burning mouth syndrome  Type 2 diabetes mellitus with hyperglycemia, without long-term current use of insulin: Type 2 diabetes mellitus with hyperglycemia, without long-term current use of insulin  Hypomagnesemia: Hypomagnesemia    RECS:  Asthma: not in exacerbation at this time, continue current medications.  No pulmonary contraindication to discharge home.  -remainder of the plan per medical team      Luciana Mancini MD  [phone]

## 2017-09-04 NOTE — PROGRESS NOTE ADULT - SUBJECTIVE AND OBJECTIVE BOX
Patient is a 67y old  Female who presents with a chief complaint of Worsening of tingling of mouth and tongue with subjective dyspnea for the past 2 days. (03 Sep 2017 03:49)      SUBJECTIVE / OVERNIGHT EVENTS: feels much better, symptoms of admission has resolved.  pt was hypoglycemic.. received latus.  pt is not diabetic    MEDICATIONS  (STANDING):  predniSONE   Tablet 10 milliGRAM(s) Oral daily  methadone    Tablet 10 milliGRAM(s) Oral three times a day before meals  sertraline 50 milliGRAM(s) Oral daily  atorvastatin 20 milliGRAM(s) Oral at bedtime  clopidogrel Tablet 75 milliGRAM(s) Oral daily  tiotropium 18 MICROgram(s) Capsule 1 Capsule(s) Inhalation daily  calcitonin Nasal 1 Spray(s) Nasal daily  buDESOnide 160 MICROgram(s)/formoterol 4.5 MICROgram(s) Inhaler 2 Puff(s) Inhalation two times a day  ezetimibe 10 milliGRAM(s) Oral daily  zileuton 600 milliGRAM(s) Oral two times a day  insulin lispro (HumaLOG) corrective regimen sliding scale   SubCutaneous at bedtime  dextrose 5%. 1000 milliLiter(s) (50 mL/Hr) IV Continuous <Continuous>  dextrose 50% Injectable 12.5 Gram(s) IV Push once  dextrose 50% Injectable 25 Gram(s) IV Push once  dextrose 50% Injectable 25 Gram(s) IV Push once  heparin  Injectable 5000 Unit(s) SubCutaneous every 12 hours  ALBUTerol    90 MICROgram(s) HFA Inhaler 1 Puff(s) Inhalation every 4 hours  magnesium oxide 400 milliGRAM(s) Oral two times a day with meals    MEDICATIONS  (PRN):  ondansetron Injectable 4 milliGRAM(s) IV Push every 12 hours PRN Nausea  ALPRAZolam 0.25 milliGRAM(s) Oral daily PRN anxiety  dextrose Gel 1 Dose(s) Oral once PRN Blood Glucose LESS THAN 70 milliGRAM(s)/deciliter  glucagon  Injectable 1 milliGRAM(s) IntraMuscular once PRN Glucose LESS THAN 70 milligrams/deciliter  oxyCODONE    IR 5 milliGRAM(s) Oral two times a day PRN Moderate Pain (4 - 6)  acetaminophen   Tablet. 650 milliGRAM(s) Oral every 6 hours PRN Mild Pain (1 - 3)  ALBUTerol/ipratropium for Nebulization 3 milliLiter(s) Nebulizer every 6 hours PRN Wheezing      Vital Signs Last 24 Hrs  T(C): 36.8 (04 Sep 2017 03:50), Max: 36.8 (04 Sep 2017 03:50)  T(F): 98.3 (04 Sep 2017 03:50), Max: 98.3 (04 Sep 2017 03:50)  HR: 84 (04 Sep 2017 03:50) (83 - 89)  BP: 129/68 (04 Sep 2017 03:50) (116/70 - 136/80)  BP(mean): --  RR: 16 (04 Sep 2017 03:50) (16 - 17)  SpO2: 99% (04 Sep 2017 03:50) (99% - 100%)  CAPILLARY BLOOD GLUCOSE  144 (04 Sep 2017 09:33)  94 (04 Sep 2017 09:06)  67 (04 Sep 2017 08:29)  65 (04 Sep 2017 08:07)  68 (04 Sep 2017 08:00)  109 (03 Sep 2017 21:27)  71 (03 Sep 2017 17:51)  86 (03 Sep 2017 12:57)        I&O's Summary    03 Sep 2017 07:01  -  04 Sep 2017 07:00  --------------------------------------------------------  IN: 410 mL / OUT: 750 mL / NET: -340 mL        PHYSICAL EXAM:  GENERAL: NAD, well-developed  HEAD:  Atraumatic, Normocephalic  EYES: EOMI, PERRLA, conjunctiva and sclera clear  NECK: Supple, No JVD  CHEST/LUNG: Clear to auscultation bilaterally; No wheeze  HEART: Regular rate and rhythm; No murmurs, rubs, or gallops  ABDOMEN: Soft, Nontender, Nondistended; Bowel sounds present  EXTREMITIES:  2+ Peripheral Pulses, No clubbing, cyanosis, or edema  PSYCH: AAOx3  NEUROLOGY: non-focal  SKIN: No rashes or lesions    LABS:                        9.6    8.7   )-----------( 312      ( 04 Sep 2017 06:15 )             29.9     09-04    137  |  101  |  15  ----------------------------<  63<L>  4.2   |  24  |  0.45<L>    Ca    8.0<L>      04 Sep 2017 06:15  Phos  2.5       Mg     1.8         TPro  4.7<L>  /  Alb  3.0<L>  /  TBili  0.4  /  DBili  x   /  AST  16  /  ALT  12  /  AlkPhos  70      PT/INR - ( 03 Sep 2017 00:00 )   PT: 11.5 sec;   INR: 1.05 ratio         PTT - ( 03 Sep 2017 00:00 )  PTT:36.8 sec  CARDIAC MARKERS ( 04 Sep 2017 06:15 )  x     / x     / 37 U/L / x     / x      CARDIAC MARKERS ( 03 Sep 2017 21:59 )  x     / <0.01 ng/mL / x     / x     / 4.1 ng/mL  CARDIAC MARKERS ( 03 Sep 2017 15:03 )  x     / <0.01 ng/mL / x     / x     / 3.1 ng/mL  CARDIAC MARKERS ( 03 Sep 2017 06:39 )  x     / <0.01 ng/mL / 29 U/L / x     / 2.5 ng/mL      Urinalysis Basic - ( 03 Sep 2017 12:35 )    Color: x / Appearance: Clear / S.011 / pH: x  Gluc: x / Ketone: Negative  / Bili: Negative / Urobili: 2   Blood: x / Protein: Negative / Nitrite: Negative   Leuk Esterase: Negative / RBC: x / WBC x   Sq Epi: x / Non Sq Epi: x / Bacteria: x        RADIOLOGY & ADDITIONAL TESTS:    Imaging Personally Reviewed:    Consultant(s) Notes Reviewed:      Care Discussed with Consultants/Other Providers:

## 2017-09-04 NOTE — DISCHARGE NOTE ADULT - MEDICATION SUMMARY - MEDICATIONS TO STOP TAKING
I will STOP taking the medications listed below when I get home from the hospital:    Dyazide 37.5 mg-25 mg oral capsule  -- 1 cap(s) by mouth once a day

## 2017-09-04 NOTE — PROGRESS NOTE ADULT - ASSESSMENT
NIGHT HOSPITALIST:  Presentation of patient with apparently steroid dependent asthma by history, chronic pain syndrome and anxiety disorder, with cerebrovascular disease, likely has type 2 diabetes mellitus proper suspected steroid induced>>will repeat basic assay now with hyperglycemia and borderline AG.  Will place on Lantus when weight is available with S/S.  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  The possibility that Amitiza (for patient's hx of constipation) may exacerbate oropharyngeal pain or dyspnea will hold Amitiza for now.  Will HOLD the PPI with clear potential for hypomagnesemia.  Pepcid can have risk for QTc prolongation (likely from the low Mg++).  Will supplement K+ to 4.0.  Will also temporarily HOLD Dyazide with hyponatremia and hypomagnesemia.  Spouse agrees to a trial of low dose Oxycodone IR 5 mg BID PRN for breakthrough with patient's oral pain (no clear evidence of gingival or dental disease) with methadone only for chronic pain.  Patient has been on low dose Xanax for several months and will have to continue the low dose PRN to avoid withdrawal  (see [place] ).    The patient's catabolic state is of concern, and the etiology is difficult to sort at present and may need to be reviewed with patient's outpatient physicians, but will start low dose Lantus in the morning (patient received no insulin in the ER for a plasma glucose of 313) to avoid excessive glucose toxicity and risk for DKA.  Will assume aspiration risk, but will provide a soft consistent carbohydrate diet, nutrition evaluation.  Patient's cognitive dysfunction from Alzheimer's has appears to have resulted in moderate to severe functional and cognitive impairment>>would consider evaluation by patient's neurologist.

## 2017-09-04 NOTE — DISCHARGE NOTE ADULT - MEDICATION SUMMARY - MEDICATIONS TO TAKE
I will START or STAY ON the medications listed below when I get home from the hospital:    predniSONE 10 mg oral tablet  -- 1 tab(s) by mouth once a day  -- Indication: For Burning mouth syndrome    methadone 10 mg oral tablet  -- 1 tab(s) by mouth 3 times a day (before meals)  -- Indication: For chronic back pain     gabapentin 100 mg oral tablet  --  by mouth 3 times a day  -- Indication: For chronic back pain     sertraline 50 mg oral tablet  -- 1 tab(s) by mouth once a day  -- Indication: For Alzheimer's disease of other onset    ondansetron 4 mg oral tablet  -- 1 tab(s) by mouth every 8 hours, As Needed  -- Indication: For nausea     Lipitor 20 mg oral tablet  -- 1 tab(s) by mouth once a day (at bedtime)  -- Avoid grapefruit and grapefruit juice while taking this medication.  Do not take this drug if you are pregnant.  It is very important that you take or use this exactly as directed.  Do not skip doses or discontinue unless directed by your doctor.  Obtain medical advice before taking any non-prescription drugs as some may affect the action of this medication.  Take with food or milk.    -- Indication: For High cholestrol    Zetia 10 mg oral tablet  -- 1 tab(s) by mouth once a day  -- Indication: For High cholestrol     Plavix 75 mg oral tablet  -- 1 tab(s) by mouth once a day  -- Do not take aspirin or aspirin containing products without knowledge and consent of your physician.    -- Indication: For CAD    Valtrex 500 mg oral tablet  -- 1 tab(s) by mouth every 12 hours  -- Indication: For Antiviral     ALPRAZolam 0.25 mg oral tablet  -- 1 tab(s) by mouth 2 times a day, As needed, anxiety  -- Indication: For Anxiety     tiotropium 18 mcg inhalation capsule  -- 1 cap(s) inhaled once a day  -- Indication: For Asthma    Advair Diskus 500 mcg-50 mcg inhalation powder  -- 2 puff(s) inhaled 2 times a day  -- Indication: For Asthma    calcitonin 200 intl units/inh nasal spray  -- 1 spray(s) into nose once a day  -- Indication: For supplement     Amitiza 24 mcg oral capsule  -- 1 cap(s) by mouth 2 times a day  -- Indication: For GI    zileuton 600 mg oral tablet  -- 1 tab(s) by mouth 2 times a day  -- Indication: For Asthma    magnesium oxide 400 mg (241.3 mg elemental magnesium) oral tablet  -- 1 tab(s) by mouth 2 times a day (with meals)  -- Indication: For Hypomagnesemia I will START or STAY ON the medications listed below when I get home from the hospital:    predniSONE 10 mg oral tablet  -- 1 tab(s) by mouth once a day  -- Indication: For Burning mouth syndrome    methadone 10 mg oral tablet  -- 1 tab(s) by mouth 3 times a day (before meals)  -- Indication: For chronic back pain     gabapentin 100 mg oral tablet  --  by mouth 3 times a day  -- Indication: For chronic back pain     sertraline 50 mg oral tablet  -- 1 tab(s) by mouth once a day  -- Indication: For Alzheimer's disease of other onset    ondansetron 4 mg oral tablet  -- 1 tab(s) by mouth every 8 hours, As Needed  -- Indication: For nausea     Lipitor 20 mg oral tablet  -- 1 tab(s) by mouth once a day (at bedtime)  -- Avoid grapefruit and grapefruit juice while taking this medication.  Do not take this drug if you are pregnant.  It is very important that you take or use this exactly as directed.  Do not skip doses or discontinue unless directed by your doctor.  Obtain medical advice before taking any non-prescription drugs as some may affect the action of this medication.  Take with food or milk.    -- Indication: For High cholestrol    Zetia 10 mg oral tablet  -- 1 tab(s) by mouth once a day  -- Indication: For High cholestrol     Plavix 75 mg oral tablet  -- 1 tab(s) by mouth once a day  -- Do not take aspirin or aspirin containing products without knowledge and consent of your physician.    -- Indication: For CAD    Valtrex 500 mg oral tablet  -- 1 tab(s) by mouth every 12 hours  -- Indication: For Antiviral     ALPRAZolam 0.25 mg oral tablet  -- 1 tab(s) by mouth 2 times a day, As needed, anxiety  -- Indication: For Anxiety     tiotropium 18 mcg inhalation capsule  -- 1 cap(s) inhaled once a day  -- Indication: For Asthma    Advair Diskus 500 mcg-50 mcg inhalation powder  -- 2 puff(s) inhaled 2 times a day  -- Indication: For Asthma    calcitonin 200 intl units/inh nasal spray  -- 1 spray(s) into nose once a day  -- Indication: For supplement     Amitiza 24 mcg oral capsule  -- 1 cap(s) by mouth 2 times a day  -- Indication: For GI    zileuton 600 mg oral tablet  -- 1 tab(s) by mouth 2 times a day  -- Indication: For Asthma    magnesium oxide 400 mg (241.3 mg elemental magnesium) oral tablet  -- 1 tab(s) by mouth 2 times a day (with meals)  -- Indication: For Hypomagnesemia    Protonix 40 mg oral delayed release tablet  -- 1 tab(s) by mouth once a day  -- Indication: For GERD

## 2017-09-04 NOTE — CHART NOTE - NSCHARTNOTEFT_GEN_A_CORE
Patient is a 67y old  Female who presents with a chief complaint of Worsening of tingling of mouth and tongue with subjective dyspnea for the past 2 days. (04 Sep 2017 16:18)      Vital Signs Last 24 Hrs  T(C): 36.9 (04 Sep 2017 13:23), Max: 36.9 (04 Sep 2017 13:23)  T(F): 98.5 (04 Sep 2017 13:23), Max: 98.5 (04 Sep 2017 13:23)  HR: 86 (04 Sep 2017 13:23) (83 - 86)  BP: 125/72 (04 Sep 2017 13:23) (116/70 - 129/68)  BP(mean): --  RR: 18 (04 Sep 2017 13:23) (16 - 18)  SpO2: 97% (04 Sep 2017 13:23) (97% - 100%)      Labs:                          9.6    8.7   )-----------( 312      ( 04 Sep 2017 06:15 )             29.9     09-04    137  |  101  |  15  ----------------------------<  63<L>  4.2   |  24  |  0.45<L>    Ca    8.0<L>      04 Sep 2017 06:15  Phos  2.5     09-04  Mg     1.8     09-04    TPro  4.7<L>  /  Alb  3.0<L>  /  TBili  0.4  /  DBili  x   /  AST  16  /  ALT  12  /  AlkPhos  70  09-04    CARDIAC MARKERS ( 04 Sep 2017 06:15 )  x     / x     / 37 U/L / x     / x      CARDIAC MARKERS ( 03 Sep 2017 21:59 )  x     / <0.01 ng/mL / x     / x     / 4.1 ng/mL  CARDIAC MARKERS ( 03 Sep 2017 15:03 )  x     / <0.01 ng/mL / x     / x     / 3.1 ng/mL  CARDIAC MARKERS ( 03 Sep 2017 06:39 )  x     / <0.01 ng/mL / 29 U/L / x     / 2.5 ng/mL      ABG - ( 03 Sep 2017 13:40 )  pH: 7.51  /  pCO2: 28    /  pO2: 114   / HCO3: 22    / Base Excess: .4    /  SaO2: 99                  Radiology:    Physical Exam:  General: WN/WD NAD  Neurology: A&Ox3, nonfocal, ALLAN x 4  Head:  Normocephalic, atraumatic  Respiratory: CTA B/L  CV: RRR, S1S2, no murmur  Abdominal: Soft, NT, ND no palpable mass  MSK: No edema, + peripheral pulses, FROM all 4 extremity    Discharge Note and Plan: Discussed Dr Alonzo stable for discharge   >Follow up with Dr Mccallum stable for discharge   >  >  >

## 2017-09-04 NOTE — PROVIDER CONTACT NOTE (OTHER) - RECOMMENDATIONS
Follow hypoglycemic protocol, 4oz of apple juice given. FS rechecked in 15 min. Follow protocol until FS>100.

## 2017-09-04 NOTE — CHART NOTE - NSCHARTNOTEFT_GEN_A_CORE
9/3/17 :- 23:30 Pts BMP showed K 5.9. Pt seen and evaluated at bedside. Asymptomatic. No c/o of chest pain, SOB or palpitations. EKG done showed no acute changes. Discussed with Dr Rubalcava, recommended Edecrin 25mg ivpx1, NS 50cc/hr x10 hrs and repeat BMP.   Repeat  VBG was done prior to Edecrin administration, which showed K- 4.4. Hence Edecrin Discontinued.    Grace Kan, FNP-C  [phone]

## 2017-09-04 NOTE — DIETITIAN INITIAL EVALUATION ADULT. - OTHER INFO
Pt seen for consult: nutrition services assessment. Pt currently reports improving appetite able to eat ~50% of meals. Pt admits to wt loss of 30 pounds over the past few years, r/t burning mouth syndrome, hx UC and comorbidities resulting in decreased po intake. Pt denies food preferences, food allergies, denies chewing/swallowing difficulty, denies GI distress at this time. Pt denies taking oral supplements at home and denies need currently as she is likely being d/c home today.

## 2017-09-04 NOTE — PROGRESS NOTE ADULT - ASSESSMENT
66 yo F with Alz with cva asthma htn and ulcerative colitis with chronic back pain on pain meds along with GERD  1- hypomagnesemia  2- hyponatremia resolved   3- htn  4- hypocalcemia resolved  5- chronic back pain  pain meds to cont    pt hypoglycemic. pt is not diabetic. received insulin  last pm k high repeat in range.   cont with mag ox 400mg bid with PPI  no further insulin   FS q2 hr for next few hours if glu in range then dc home later today pt  wants to take her home today

## 2017-09-04 NOTE — DISCHARGE NOTE ADULT - PATIENT PORTAL LINK FT
“You can access the Follow[facility] Patient Portal, offered by [facility], by registering with the following website: [email or web address]”

## 2017-09-04 NOTE — PROGRESS NOTE ADULT - ASSESSMENT
HPI:  NIGHT HOSPITALIST:  Patient UNKNOWN to me previously, assigned to me at this point via the ER and by Dr. 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 Cheek of neurology) with patient noting progressive worsening ot tingling of the mouth and tongue for the past 2 days with subjective worsening dyspnea.  Patient did not tolerate oral lidocaine prescribed by her cardiologist, nor the Gabapentin by her neurologist (the latter with increased sedation), and has been recently on Valtrex on presumed oral varicella infection.  Patient noted on ER assay to have severe hypomagnesemia.  History from spouse.  Patient is alert to self, repeats questions by examiner to spouse with patient dependent upon spouse for interview.  No requirement for prompting by examiner.  No chest pain/pressure.  No dyspnea, no wheezing at present.  No fever, no chills, no rigors.  No HA, no focal weakness.  NO LEFT arm pain.  No abdominal pain, no red blood per rectum or melena.  No back pain, no tearing back pain.  NO hematuria, no dysuria.  No cough.  Patient with anorexia and unspecified weight loss.  Remaining review of systems not contributory. (03 Sep 2017 03:49)   #1 hypoglycemia possible related to Lantus received on 9/3/2017    -Follow glucose protocol  - fingerstick [id number]   stop lantus   pending HBA1c   vital sign   Discussed with DR Mccallum agreed with above plan

## 2017-09-04 NOTE — PROGRESS NOTE ADULT - SUBJECTIVE AND OBJECTIVE BOX
[facility]   [phone]  RENAL FOLLOW UP NOTE  --------------------------------------------------------------------------------  Chief Complaint:    24 hour events/subjective:    states feels better overall   PAST HISTORY  --------------------------------------------------------------------------------  No significant changes to PMH, PSH, FHx, SHx, unless otherwise noted    ALLERGIES & MEDICATIONS  --------------------------------------------------------------------------------  Allergies    ASA; dye contrast (Anaphylaxis)  aspirin (Short breath)  penicillin (Short breath)  sulfa drugs (Short breath)    Intolerances      Standing Inpatient Medications  predniSONE   Tablet 10 milliGRAM(s) Oral daily  methadone    Tablet 10 milliGRAM(s) Oral three times a day before meals  sertraline 50 milliGRAM(s) Oral daily  atorvastatin 20 milliGRAM(s) Oral at bedtime  clopidogrel Tablet 75 milliGRAM(s) Oral daily  tiotropium 18 MICROgram(s) Capsule 1 Capsule(s) Inhalation daily  calcitonin Nasal 1 Spray(s) Nasal daily  buDESOnide 160 MICROgram(s)/formoterol 4.5 MICROgram(s) Inhaler 2 Puff(s) Inhalation two times a day  ezetimibe 10 milliGRAM(s) Oral daily  zileuton 600 milliGRAM(s) Oral two times a day  insulin lispro (HumaLOG) corrective regimen sliding scale   SubCutaneous at bedtime  dextrose 5%. 1000 milliLiter(s) IV Continuous <Continuous>  dextrose 50% Injectable 12.5 Gram(s) IV Push once  dextrose 50% Injectable 25 Gram(s) IV Push once  dextrose 50% Injectable 25 Gram(s) IV Push once  heparin  Injectable 5000 Unit(s) SubCutaneous every 12 hours  ALBUTerol    90 MICROgram(s) HFA Inhaler 1 Puff(s) Inhalation every 4 hours  magnesium oxide 400 milliGRAM(s) Oral two times a day with meals    PRN Inpatient Medications  ondansetron Injectable 4 milliGRAM(s) IV Push every 12 hours PRN  ALPRAZolam 0.25 milliGRAM(s) Oral daily PRN  dextrose Gel 1 Dose(s) Oral once PRN  glucagon  Injectable 1 milliGRAM(s) IntraMuscular once PRN  oxyCODONE    IR 5 milliGRAM(s) Oral two times a day PRN  acetaminophen   Tablet. 650 milliGRAM(s) Oral every 6 hours PRN  ALBUTerol/ipratropium for Nebulization 3 milliLiter(s) Nebulizer every 6 hours PRN      REVIEW OF SYSTEMS  --------------------------------------------------------------------------------    Gen: denies fevers/chills,  CVS: denies chest pain/palpitations  Resp: denies SOB/Cough  GI: Denies N/V/Abd pain  : Denies dysuria/oliguria/hematuria    All other systems were reviewed and are negative, except as noted.    VITALS/PHYSICAL EXAM  --------------------------------------------------------------------------------  T(C): 36.8 (09-04-17 @ 03:50), Max: 36.8 (09-04-17 @ 03:50)  HR: 84 (09-04-17 @ 03:50) (83 - 89)  BP: 129/68 (09-04-17 @ 03:50) (116/70 - 136/80)  RR: 16 (09-04-17 @ 03:50) (16 - 17)  SpO2: 99% (09-04-17 @ 03:50) (99% - 100%)  Wt(kg): --  Height (cm): 144.78 (09-03-17 @ 04:00)  Weight (kg): 37.9 (09-03-17 @ 04:00)  BMI (kg/m2): 18.1 (09-03-17 @ 04:00)  BSA (m2): 1.24 (09-03-17 @ 04:00)      09-03-17 @ 07:01  -  09-04-17 @ 07:00  --------------------------------------------------------  IN: 410 mL / OUT: 750 mL / NET: -340 mL    09-04-17 @ 07:01  -  09-04-17 @ 11:00  --------------------------------------------------------  IN: 300 mL / OUT: 600 mL / NET: -300 mL      Physical Exam:  	    Physical Exam:  	Gen: alert oriented  	Pulm: CTA . no rales or ronchi or wheezing  	CV: RRR, S1/S2. no rub  	Back: No CVA tenderness; no sacral edema  	Abd: +BS, soft, nontender/nondistended  	: No suprapubic tenderness.               Extremity: No cyanosis, no edema no clubbing  	      LABS/STUDIES  --------------------------------------------------------------------------------              9.6    8.7   >-----------<  312      [09-04-17 @ 06:15]              29.9     137  |  101  |  15  ----------------------------<  63      [09-04-17 @ 06:15]  4.2   |  24  |  0.45        Ca     8.0     [09-04-17 @ 06:15]      Mg     1.8     [09-04-17 @ 06:15]      Phos  2.5     [09-04-17 @ 06:15]    TPro  4.7  /  Alb  3.0  /  TBili  0.4  /  DBili  x   /  AST  16  /  ALT  12  /  AlkPhos  70  [09-04-17 @ 06:15]    PT/INR: PT 11.5 , INR 1.05       [09-03-17 @ 00:00]  PTT: 36.8       [09-03-17 @ 00:00]    Troponin <0.01      [09-03-17 @ 21:59]  CK 37      [09-04-17 @ 06:15]    Creatinine Trend:  SCr 0.45 [09-04 @ 06:15]  SCr 0.65 [09-03 @ 21:59]  SCr 0.55 [09-03 @ 06:39]  SCr 0.75 [09-03 @ 00:00]              Urinalysis - [09-03-17 @ 12:35]      Color  / Appearance Clear / SG 1.011 / pH 7.0      Gluc 100 / Ketone Negative  / Bili Negative / Urobili 2       Blood Negative / Protein Negative / Leuk Est Negative / Nitrite Negative      RBC  / WBC  / Hyaline  / Gran  / Sq Epi  / Non Sq Epi  / Bacteria       Iron 14, TIBC 182, %sat 8      [11-28-16 @ 07:50]  Ferritin 75.2      [11-28-16 @ 07:50]  HbA1c 6.9      [12-01-16 @ 07:26]  TSH 0.82      [09-03-17 @ 09:14]

## 2017-09-04 NOTE — DIETITIAN INITIAL EVALUATION ADULT. - ORAL INTAKE PTA
Pt reports fair-poor intake PTA. Has hx of burning mouth syndrome which pt states jack her from eating. However pt states it has currently improved and she is able to eat better./fair

## 2017-09-04 NOTE — PROVIDER CONTACT NOTE (OTHER) - ASSESSMENT
Pt A+Ox4. C/o feeling cold, no other s/s of hypoglycemia. Pt AM glucose lab value=63, FS 68,65. Pt able to tolerate PO.

## 2017-09-04 NOTE — DIETITIAN INITIAL EVALUATION ADULT. - ENERGY NEEDS
ht: 57 inches. wt: 83 pounds, 88 pounds stated by pt. BMI: 18.1 kG/m2. UBW: 118 pounds per pt ~2 years ago. IBW: 95 pounds +/- 10%. %IBW: 87%  Other pertinent objective information: 67 year old female pt with PMH asthma, anxiety, Alzheimers, recent stroke several months ago, also with burning mouth syndrome that as per family is refractory to medications.  P/W SOB over the last day or so, related to many stressors at home, worsening in her mouth burning/tongue pain over the last several weeks. Pt found to have low magnesium, hyperkalemia; both currently resolved. Also elevated HgA1c, fingersticks likely steroid induced hyperglycemia. No pressure ulcers.

## 2017-09-05 ENCOUNTER — MEDICATION RENEWAL (OUTPATIENT)
Age: 67
End: 2017-09-05

## 2017-09-05 LAB
-  AMPICILLIN: SIGNIFICANT CHANGE UP
-  CIPROFLOXACIN: SIGNIFICANT CHANGE UP
-  NITROFURANTOIN: SIGNIFICANT CHANGE UP
-  TETRACYCLINE: SIGNIFICANT CHANGE UP
-  VANCOMYCIN: SIGNIFICANT CHANGE UP
CULTURE RESULTS: SIGNIFICANT CHANGE UP
METHOD TYPE: SIGNIFICANT CHANGE UP
ORGANISM # SPEC MICROSCOPIC CNT: SIGNIFICANT CHANGE UP
ORGANISM # SPEC MICROSCOPIC CNT: SIGNIFICANT CHANGE UP
SPECIMEN SOURCE: SIGNIFICANT CHANGE UP

## 2017-09-06 ENCOUNTER — MEDICATION RENEWAL (OUTPATIENT)
Age: 67
End: 2017-09-06

## 2017-09-22 ENCOUNTER — OUTPATIENT (OUTPATIENT)
Dept: OUTPATIENT SERVICES | Facility: HOSPITAL | Age: 67
LOS: 1 days | End: 2017-09-22
Payer: MEDICARE

## 2017-09-22 ENCOUNTER — TRANSCRIPTION ENCOUNTER (OUTPATIENT)
Age: 67
End: 2017-09-22

## 2017-09-22 VITALS
DIASTOLIC BLOOD PRESSURE: 87 MMHG | SYSTOLIC BLOOD PRESSURE: 154 MMHG | WEIGHT: 79.37 LBS | OXYGEN SATURATION: 98 % | HEIGHT: 57 IN | HEART RATE: 94 BPM | RESPIRATION RATE: 17 BRPM | TEMPERATURE: 98 F

## 2017-09-22 VITALS
HEART RATE: 98 BPM | RESPIRATION RATE: 15 BRPM | SYSTOLIC BLOOD PRESSURE: 140 MMHG | OXYGEN SATURATION: 100 % | DIASTOLIC BLOOD PRESSURE: 81 MMHG

## 2017-09-22 DIAGNOSIS — Z90.710 ACQUIRED ABSENCE OF BOTH CERVIX AND UTERUS: Chronic | ICD-10-CM

## 2017-09-22 DIAGNOSIS — Z98.42 CATARACT EXTRACTION STATUS, LEFT EYE: Chronic | ICD-10-CM

## 2017-09-22 DIAGNOSIS — S42.293A OTHER DISPLACED FRACTURE OF UPPER END OF UNSPECIFIED HUMERUS, INITIAL ENCOUNTER FOR CLOSED FRACTURE: Chronic | ICD-10-CM

## 2017-09-22 DIAGNOSIS — H25.811 COMBINED FORMS OF AGE-RELATED CATARACT, RIGHT EYE: ICD-10-CM

## 2017-09-22 DIAGNOSIS — Z96.659 PRESENCE OF UNSPECIFIED ARTIFICIAL KNEE JOINT: Chronic | ICD-10-CM

## 2017-09-22 PROCEDURE — C1780: CPT

## 2017-09-22 PROCEDURE — 66982 XCAPSL CTRC RMVL CPLX WO ECP: CPT | Mod: RT

## 2017-09-22 NOTE — ASU DISCHARGE PLAN (ADULT/PEDIATRIC). - NOTIFY
Pain not relieved by Medications/Fever greater than 101/Persistent Nausea and Vomiting/Swelling that continues/Bleeding that does not stop

## 2017-10-19 NOTE — ED PROVIDER NOTE - CROS ED ROS STATEMENT
all other ROS negative except as per HPI Instructions (Optional): Cryotherapy Detail Level: Detailed

## 2017-11-07 ENCOUNTER — MEDICATION RENEWAL (OUTPATIENT)
Age: 67
End: 2017-11-07

## 2017-11-13 ENCOUNTER — NON-APPOINTMENT (OUTPATIENT)
Age: 67
End: 2017-11-13

## 2017-11-13 ENCOUNTER — APPOINTMENT (OUTPATIENT)
Dept: CARDIOLOGY | Facility: CLINIC | Age: 67
End: 2017-11-13
Payer: MEDICARE

## 2017-11-13 VITALS
DIASTOLIC BLOOD PRESSURE: 89 MMHG | TEMPERATURE: 98.6 F | WEIGHT: 78 LBS | HEIGHT: 57 IN | SYSTOLIC BLOOD PRESSURE: 147 MMHG | BODY MASS INDEX: 16.83 KG/M2

## 2017-11-13 DIAGNOSIS — R20.2 ANESTHESIA OF SKIN: ICD-10-CM

## 2017-11-13 DIAGNOSIS — R20.0 ANESTHESIA OF SKIN: ICD-10-CM

## 2017-11-13 DIAGNOSIS — M79.89 OTHER SPECIFIED SOFT TISSUE DISORDERS: ICD-10-CM

## 2017-11-13 PROCEDURE — 99214 OFFICE O/P EST MOD 30 MIN: CPT

## 2017-11-13 PROCEDURE — 93000 ELECTROCARDIOGRAM COMPLETE: CPT

## 2017-11-13 RX ORDER — AZITHROMYCIN 250 MG/1
250 TABLET, FILM COATED ORAL
Qty: 6 | Refills: 0 | Status: COMPLETED | COMMUNITY
Start: 2017-07-16

## 2017-11-13 RX ORDER — AMITRIPTYLINE HYDROCHLORIDE 10 MG/1
10 TABLET, FILM COATED ORAL
Qty: 30 | Refills: 0 | Status: COMPLETED | COMMUNITY
Start: 2017-09-11

## 2017-11-13 RX ORDER — POTASSIUM CHLORIDE 1500 MG/1
20 TABLET, EXTENDED RELEASE ORAL
Qty: 5 | Refills: 0 | Status: COMPLETED | COMMUNITY
Start: 2017-09-19

## 2017-11-13 RX ORDER — PREDNISONE 10 MG/1
10 TABLET ORAL
Qty: 30 | Refills: 0 | Status: COMPLETED | COMMUNITY
Start: 2017-06-22

## 2017-11-13 RX ORDER — ONDANSETRON 4 MG/1
4 TABLET ORAL
Qty: 90 | Refills: 0 | Status: COMPLETED | COMMUNITY
Start: 2017-04-04

## 2017-11-13 RX ORDER — CHLORHEXIDINE GLUCONATE, 0.12% ORAL RINSE 1.2 MG/ML
0.12 SOLUTION DENTAL
Qty: 473 | Refills: 0 | Status: COMPLETED | COMMUNITY
Start: 2017-10-17

## 2017-11-13 RX ORDER — GABAPENTIN 100 MG/1
100 CAPSULE ORAL
Qty: 90 | Refills: 0 | Status: COMPLETED | COMMUNITY
Start: 2017-06-28

## 2017-11-13 RX ORDER — PREGABALIN 25 MG/1
25 CAPSULE ORAL
Qty: 90 | Refills: 0 | Status: COMPLETED | COMMUNITY
Start: 2017-09-20

## 2017-11-13 RX ORDER — ALBUTEROL SULFATE 90 UG/1
108 (90 BASE) AEROSOL, METERED RESPIRATORY (INHALATION)
Qty: 85 | Refills: 0 | Status: COMPLETED | COMMUNITY
Start: 2017-06-26

## 2017-11-13 RX ORDER — HYDROCODONE BITARTRATE AND HOMATROPINE METHYLBROMIDE 5; 1.5 MG/5ML; MG/5ML
5-1.5 SYRUP ORAL
Qty: 300 | Refills: 0 | Status: COMPLETED | COMMUNITY
Start: 2017-07-20

## 2017-11-13 RX ORDER — CEFUROXIME AXETIL 500 MG/1
500 TABLET ORAL
Qty: 14 | Refills: 0 | Status: COMPLETED | COMMUNITY
Start: 2017-07-20

## 2017-11-13 RX ORDER — MAGNESIUM OXIDE 400 MG
400 (241.3 MG) TABLET ORAL
Qty: 60 | Refills: 0 | Status: COMPLETED | COMMUNITY
Start: 2017-09-04

## 2017-11-13 RX ORDER — QUETIAPINE FUMARATE 25 MG/1
25 TABLET ORAL
Qty: 60 | Refills: 0 | Status: COMPLETED | COMMUNITY
Start: 2017-10-06

## 2017-11-13 RX ORDER — FLUCONAZOLE 100 MG/1
100 TABLET ORAL
Qty: 10 | Refills: 0 | Status: COMPLETED | COMMUNITY
Start: 2017-10-17

## 2017-11-13 RX ORDER — DOXYCYCLINE HYCLATE 100 MG/1
100 CAPSULE ORAL
Qty: 14 | Refills: 0 | Status: COMPLETED | COMMUNITY
Start: 2017-09-12

## 2017-11-13 RX ORDER — TRAMADOL HYDROCHLORIDE 50 MG/1
50 TABLET, COATED ORAL
Qty: 14 | Refills: 0 | Status: COMPLETED | COMMUNITY
Start: 2017-08-16

## 2017-12-01 ENCOUNTER — APPOINTMENT (OUTPATIENT)
Dept: PAIN MANAGEMENT | Facility: CLINIC | Age: 67
End: 2017-12-01
Payer: MEDICARE

## 2017-12-01 VITALS
HEIGHT: 57 IN | HEART RATE: 105 BPM | DIASTOLIC BLOOD PRESSURE: 88 MMHG | SYSTOLIC BLOOD PRESSURE: 149 MMHG | WEIGHT: 87 LBS | BODY MASS INDEX: 18.77 KG/M2

## 2017-12-01 DIAGNOSIS — M54.5 LOW BACK PAIN: ICD-10-CM

## 2017-12-01 DIAGNOSIS — M54.16 RADICULOPATHY, LUMBAR REGION: ICD-10-CM

## 2017-12-01 PROCEDURE — 99213 OFFICE O/P EST LOW 20 MIN: CPT

## 2017-12-08 ENCOUNTER — MEDICATION RENEWAL (OUTPATIENT)
Age: 67
End: 2017-12-08

## 2017-12-12 ENCOUNTER — RX RENEWAL (OUTPATIENT)
Age: 67
End: 2017-12-12

## 2018-01-12 ENCOUNTER — MEDICATION RENEWAL (OUTPATIENT)
Age: 68
End: 2018-01-12

## 2018-02-22 ENCOUNTER — MEDICATION RENEWAL (OUTPATIENT)
Age: 68
End: 2018-02-22

## 2018-03-02 ENCOUNTER — APPOINTMENT (OUTPATIENT)
Dept: PAIN MANAGEMENT | Facility: CLINIC | Age: 68
End: 2018-03-02

## 2018-03-02 ENCOUNTER — MEDICATION RENEWAL (OUTPATIENT)
Age: 68
End: 2018-03-02

## 2018-03-07 ENCOUNTER — INPATIENT (INPATIENT)
Facility: HOSPITAL | Age: 68
LOS: 1 days | Discharge: HOME | DRG: 92 | End: 2018-03-09
Attending: INTERNAL MEDICINE | Admitting: INTERNAL MEDICINE
Payer: MEDICARE

## 2018-03-07 VITALS
TEMPERATURE: 98 F | HEART RATE: 95 BPM | DIASTOLIC BLOOD PRESSURE: 81 MMHG | OXYGEN SATURATION: 98 % | RESPIRATION RATE: 18 BRPM | SYSTOLIC BLOOD PRESSURE: 158 MMHG

## 2018-03-07 DIAGNOSIS — Z98.42 CATARACT EXTRACTION STATUS, LEFT EYE: Chronic | ICD-10-CM

## 2018-03-07 DIAGNOSIS — M54.5 LOW BACK PAIN: ICD-10-CM

## 2018-03-07 DIAGNOSIS — S42.293A OTHER DISPLACED FRACTURE OF UPPER END OF UNSPECIFIED HUMERUS, INITIAL ENCOUNTER FOR CLOSED FRACTURE: Chronic | ICD-10-CM

## 2018-03-07 DIAGNOSIS — Z96.659 PRESENCE OF UNSPECIFIED ARTIFICIAL KNEE JOINT: Chronic | ICD-10-CM

## 2018-03-07 DIAGNOSIS — Z90.710 ACQUIRED ABSENCE OF BOTH CERVIX AND UTERUS: Chronic | ICD-10-CM

## 2018-03-07 LAB
ALBUMIN SERPL ELPH-MCNC: 3.5 G/DL — SIGNIFICANT CHANGE UP (ref 3.3–5)
ALP SERPL-CCNC: 83 U/L — SIGNIFICANT CHANGE UP (ref 40–120)
ALT FLD-CCNC: 18 U/L RC — SIGNIFICANT CHANGE UP (ref 10–45)
ANION GAP SERPL CALC-SCNC: 16 MMOL/L — SIGNIFICANT CHANGE UP (ref 5–17)
APPEARANCE UR: CLEAR — SIGNIFICANT CHANGE UP
APTT BLD: 29.5 SEC — SIGNIFICANT CHANGE UP (ref 27.5–37.4)
AST SERPL-CCNC: 32 U/L — SIGNIFICANT CHANGE UP (ref 10–40)
BASOPHILS # BLD AUTO: 0 K/UL — SIGNIFICANT CHANGE UP (ref 0–0.2)
BASOPHILS NFR BLD AUTO: 0.2 % — SIGNIFICANT CHANGE UP (ref 0–2)
BILIRUB SERPL-MCNC: 0.2 MG/DL — SIGNIFICANT CHANGE UP (ref 0.2–1.2)
BILIRUB UR-MCNC: NEGATIVE — SIGNIFICANT CHANGE UP
BUN SERPL-MCNC: 22 MG/DL — SIGNIFICANT CHANGE UP (ref 7–23)
CALCIUM SERPL-MCNC: 8.5 MG/DL — SIGNIFICANT CHANGE UP (ref 8.4–10.5)
CHLORIDE SERPL-SCNC: 105 MMOL/L — SIGNIFICANT CHANGE UP (ref 96–108)
CO2 SERPL-SCNC: 20 MMOL/L — LOW (ref 22–31)
COLOR SPEC: YELLOW — SIGNIFICANT CHANGE UP
CREAT SERPL-MCNC: 0.71 MG/DL — SIGNIFICANT CHANGE UP (ref 0.5–1.3)
DIFF PNL FLD: NEGATIVE — SIGNIFICANT CHANGE UP
EOSINOPHIL # BLD AUTO: 0 K/UL — SIGNIFICANT CHANGE UP (ref 0–0.5)
EOSINOPHIL NFR BLD AUTO: 0 % — SIGNIFICANT CHANGE UP (ref 0–6)
GLUCOSE SERPL-MCNC: 202 MG/DL — HIGH (ref 70–99)
GLUCOSE UR QL: NEGATIVE — SIGNIFICANT CHANGE UP
HCT VFR BLD CALC: 30.5 % — LOW (ref 34.5–45)
HGB BLD-MCNC: 9.8 G/DL — LOW (ref 11.5–15.5)
INR BLD: 0.98 RATIO — SIGNIFICANT CHANGE UP (ref 0.88–1.16)
KETONES UR-MCNC: NEGATIVE — SIGNIFICANT CHANGE UP
LEUKOCYTE ESTERASE UR-ACNC: NEGATIVE — SIGNIFICANT CHANGE UP
LYMPHOCYTES # BLD AUTO: 0.3 K/UL — LOW (ref 1–3.3)
LYMPHOCYTES # BLD AUTO: 1.9 % — LOW (ref 13–44)
MCHC RBC-ENTMCNC: 27.9 PG — SIGNIFICANT CHANGE UP (ref 27–34)
MCHC RBC-ENTMCNC: 32.3 GM/DL — SIGNIFICANT CHANGE UP (ref 32–36)
MCV RBC AUTO: 86.2 FL — SIGNIFICANT CHANGE UP (ref 80–100)
MONOCYTES # BLD AUTO: 0.4 K/UL — SIGNIFICANT CHANGE UP (ref 0–0.9)
MONOCYTES NFR BLD AUTO: 2.5 % — SIGNIFICANT CHANGE UP (ref 2–14)
NEUTROPHILS # BLD AUTO: 14 K/UL — HIGH (ref 1.8–7.4)
NEUTROPHILS NFR BLD AUTO: 95.5 % — HIGH (ref 43–77)
NITRITE UR-MCNC: NEGATIVE — SIGNIFICANT CHANGE UP
PH UR: 6 — SIGNIFICANT CHANGE UP (ref 5–8)
PLAT MORPH BLD: NORMAL — SIGNIFICANT CHANGE UP
PLATELET # BLD AUTO: 273 K/UL — SIGNIFICANT CHANGE UP (ref 150–400)
POTASSIUM SERPL-MCNC: 4.8 MMOL/L — SIGNIFICANT CHANGE UP (ref 3.5–5.3)
POTASSIUM SERPL-SCNC: 4.8 MMOL/L — SIGNIFICANT CHANGE UP (ref 3.5–5.3)
PROT SERPL-MCNC: 6.2 G/DL — SIGNIFICANT CHANGE UP (ref 6–8.3)
PROT UR-MCNC: SIGNIFICANT CHANGE UP
PROTHROM AB SERPL-ACNC: 10.7 SEC — SIGNIFICANT CHANGE UP (ref 9.8–12.7)
RBC # BLD: 3.53 M/UL — LOW (ref 3.8–5.2)
RBC # FLD: 15.7 % — HIGH (ref 10.3–14.5)
RBC BLD AUTO: SIGNIFICANT CHANGE UP
SODIUM SERPL-SCNC: 141 MMOL/L — SIGNIFICANT CHANGE UP (ref 135–145)
SP GR SPEC: 1.03 — HIGH (ref 1.01–1.02)
UROBILINOGEN FLD QL: 1 MG/DL
WBC # BLD: 14.6 K/UL — HIGH (ref 3.8–10.5)
WBC # FLD AUTO: 14.6 K/UL — HIGH (ref 3.8–10.5)

## 2018-03-07 PROCEDURE — 99285 EMERGENCY DEPT VISIT HI MDM: CPT | Mod: GC

## 2018-03-07 PROCEDURE — 71045 X-RAY EXAM CHEST 1 VIEW: CPT | Mod: 26

## 2018-03-07 RX ORDER — CLONAZEPAM 1 MG
1 TABLET ORAL ONCE
Qty: 0 | Refills: 0 | Status: DISCONTINUED | OUTPATIENT
Start: 2018-03-07 | End: 2018-03-07

## 2018-03-07 RX ORDER — SODIUM CHLORIDE 9 MG/ML
500 INJECTION INTRAMUSCULAR; INTRAVENOUS; SUBCUTANEOUS ONCE
Qty: 0 | Refills: 0 | Status: COMPLETED | OUTPATIENT
Start: 2018-03-07 | End: 2018-03-07

## 2018-03-07 RX ORDER — CLONAZEPAM 1 MG
0.5 TABLET ORAL ONCE
Qty: 0 | Refills: 0 | Status: DISCONTINUED | OUTPATIENT
Start: 2018-03-07 | End: 2018-03-07

## 2018-03-07 RX ORDER — QUETIAPINE FUMARATE 200 MG/1
100 TABLET, FILM COATED ORAL ONCE
Qty: 0 | Refills: 0 | Status: COMPLETED | OUTPATIENT
Start: 2018-03-07 | End: 2018-03-07

## 2018-03-07 RX ADMIN — SODIUM CHLORIDE 500 MILLILITER(S): 9 INJECTION INTRAMUSCULAR; INTRAVENOUS; SUBCUTANEOUS at 20:54

## 2018-03-07 RX ADMIN — Medication 0.5 MILLIGRAM(S): at 23:21

## 2018-03-07 RX ADMIN — QUETIAPINE FUMARATE 100 MILLIGRAM(S): 200 TABLET, FILM COATED ORAL at 21:41

## 2018-03-07 RX ADMIN — Medication 1 MILLIGRAM(S): at 21:27

## 2018-03-07 NOTE — ED ADULT NURSE NOTE - OBJECTIVE STATEMENT
66 y/o female hx of dementia alzheimer asthma, COPD, CVA in June on Plavix, chronic back pain on Methadone 10mg daily. BIBEMS from home with  c/o lower extremities weakness, unable to ambulate, usually walks with assistant at home. Pt is alert & orientedx3 with confusion at times, denies any pain at this time. Denies chest pain or SOB, no nausea or vomiting, no weakness noted at this time. no facial droop. Denies abdominal pain, no urinary symptoms. Safety maintained & continue monitor.

## 2018-03-07 NOTE — ED PROVIDER NOTE - OBJECTIVE STATEMENT
67F, pmhx asthma, anxiety, advanced alzheimers, CVA mid-2017, chronic low back pain on methadone presents to ED with chief complaint per  of severe low back pain, lower extremity weakness, and difficulty ambulating. Hx obtained primarily from  due to patient cognitive state. Per , patient can normally ambulate with assistance from 1-2 people. At baseline, she has chronic low back pain and nausea. Today, patient was endorsing severe worsening low back pain along with bilateral leg weakness, and was unable to ambulate even with assistance. Per , no recent hx of fevers or chills, vomiting, chest pain, shortness of breath, abdominal pain, urinary symptoms. Weight loss over last 6-7 months. Patient currently states that she does not have pain and is feeling alright. Patient knows her name, date of birth, and current location, but is not oriented as to why she came to the hospital. Per , patient has advanced alzheimers.   PCP Dr Carlito Francis, Neuro Dr Veras     Meds and allergies as below.

## 2018-03-07 NOTE — ED PROVIDER NOTE - ATTENDING CONTRIBUTION TO CARE
66 y/o f with pmhx  asthma, anxiety, alzheimers, stroke in July, presents from home by [facility] for eval of her sig pain, worsening stability of gait and pain despite taking her methadone. Blancheand with her at bedside providing additional hx. no fever. no chills. no urinary complaints. no abd pain. no headache no weakness or numbness.   Gen.  no acute distress, elderly woman, cachectic  HEENT:  mmm  Lungs:  ctab/l  CVS: S1S2   Abd;  soft non tend  Ext: trace pitting edema of b/l lower ext  Neuro: aaox3 no focal deficits follows commands. appropriate.  MSK: 5/5 x4 ext. 68 y/o f with pmhx  asthma, anxiety, alzheimers, stroke in July, presents from home by [facility] for eval of her sig pain, worsening stability of gait and pain despite taking her methadone. Blancheand with her at bedside providing additional hx. no fever. no chills. no urinary complaints. no abd pain. no headache no weakness or numbness.   PMD Dr. Francis. Dr. Veras neuro, and Dr. Martinez Cardiologist.   Gen.  no acute distress, elderly woman, cachectic  HEENT:  mmm  Lungs:  ctab/l  CVS: S1S2   Abd;  soft non tend  Ext: trace pitting edema of b/l lower ext  Neuro: aaox3 no focal deficits follows commands. appropriate.  MSK: 5/5 x4 ext.

## 2018-03-07 NOTE — ED PROVIDER NOTE - PROGRESS NOTE DETAILS
ATTG: patient is unable to ambulate and concern about safety despite having good social support at home, will admit to hospital for futher care and Physical therapy / occupational therapy eval.  as patient is a fall risk and a frail elderly patient, she will be admitted to the hospital as unsafe to discharge home.

## 2018-03-07 NOTE — ED PROVIDER NOTE - MEDICAL DECISION MAKING DETAILS
ATTG: worsening stability and pain, check labs, check urine, ivf hydration, check ekg and re eval for dispo

## 2018-03-07 NOTE — ED ADULT NURSE REASSESSMENT NOTE - NS ED NURSE REASSESS COMMENT FT1
pt is trying to get out of bed multiple times, confused and wants to go home. Ask MD to place pt on 1:1 for safety.

## 2018-03-08 DIAGNOSIS — Z29.9 ENCOUNTER FOR PROPHYLACTIC MEASURES, UNSPECIFIED: ICD-10-CM

## 2018-03-08 DIAGNOSIS — R45.1 RESTLESSNESS AND AGITATION: ICD-10-CM

## 2018-03-08 DIAGNOSIS — F32.9 MAJOR DEPRESSIVE DISORDER, SINGLE EPISODE, UNSPECIFIED: ICD-10-CM

## 2018-03-08 DIAGNOSIS — M54.5 LOW BACK PAIN: ICD-10-CM

## 2018-03-08 DIAGNOSIS — E78.5 HYPERLIPIDEMIA, UNSPECIFIED: ICD-10-CM

## 2018-03-08 DIAGNOSIS — J45.909 UNSPECIFIED ASTHMA, UNCOMPLICATED: ICD-10-CM

## 2018-03-08 DIAGNOSIS — I63.9 CEREBRAL INFARCTION, UNSPECIFIED: ICD-10-CM

## 2018-03-08 LAB
ALBUMIN SERPL ELPH-MCNC: 3.7 G/DL — SIGNIFICANT CHANGE UP (ref 3.3–5)
ALP SERPL-CCNC: 81 U/L — SIGNIFICANT CHANGE UP (ref 40–120)
ALT FLD-CCNC: 17 U/L RC — SIGNIFICANT CHANGE UP (ref 10–45)
ANION GAP SERPL CALC-SCNC: 14 MMOL/L — SIGNIFICANT CHANGE UP (ref 5–17)
AST SERPL-CCNC: 25 U/L — SIGNIFICANT CHANGE UP (ref 10–40)
BASOPHILS NFR BLD AUTO: 0 % — SIGNIFICANT CHANGE UP (ref 0–2)
BILIRUB SERPL-MCNC: 0.5 MG/DL — SIGNIFICANT CHANGE UP (ref 0.2–1.2)
BUN SERPL-MCNC: 15 MG/DL — SIGNIFICANT CHANGE UP (ref 7–23)
CALCIUM SERPL-MCNC: 8.5 MG/DL — SIGNIFICANT CHANGE UP (ref 8.4–10.5)
CHLORIDE SERPL-SCNC: 100 MMOL/L — SIGNIFICANT CHANGE UP (ref 96–108)
CO2 SERPL-SCNC: 25 MMOL/L — SIGNIFICANT CHANGE UP (ref 22–31)
CREAT SERPL-MCNC: 0.62 MG/DL — SIGNIFICANT CHANGE UP (ref 0.5–1.3)
CULTURE RESULTS: NO GROWTH — SIGNIFICANT CHANGE UP
EOSINOPHIL NFR BLD AUTO: 0 % — SIGNIFICANT CHANGE UP (ref 0–6)
GLUCOSE SERPL-MCNC: 76 MG/DL — SIGNIFICANT CHANGE UP (ref 70–99)
HCT VFR BLD CALC: 31.2 % — LOW (ref 34.5–45)
HGB BLD-MCNC: 9.8 G/DL — LOW (ref 11.5–15.5)
LYMPHOCYTES # BLD AUTO: 1.15 K/UL — SIGNIFICANT CHANGE UP (ref 1–3.3)
LYMPHOCYTES # BLD AUTO: 8 % — LOW (ref 13–44)
MAGNESIUM SERPL-MCNC: 1.5 MG/DL — LOW (ref 1.6–2.6)
MCHC RBC-ENTMCNC: 26.7 PG — LOW (ref 27–34)
MCHC RBC-ENTMCNC: 31.4 GM/DL — LOW (ref 32–36)
MCV RBC AUTO: 85 FL — SIGNIFICANT CHANGE UP (ref 80–100)
MONOCYTES NFR BLD AUTO: 7.1 % — SIGNIFICANT CHANGE UP (ref 2–14)
NEUTROPHILS # BLD AUTO: 12.21 K/UL — HIGH (ref 1.8–7.4)
NEUTROPHILS NFR BLD AUTO: 84.9 % — HIGH (ref 43–77)
PHOSPHATE SERPL-MCNC: 2.4 MG/DL — LOW (ref 2.5–4.5)
PLATELET # BLD AUTO: 326 K/UL — SIGNIFICANT CHANGE UP (ref 150–400)
POTASSIUM SERPL-MCNC: 3.5 MMOL/L — SIGNIFICANT CHANGE UP (ref 3.5–5.3)
POTASSIUM SERPL-SCNC: 3.5 MMOL/L — SIGNIFICANT CHANGE UP (ref 3.5–5.3)
PROT SERPL-MCNC: 6.5 G/DL — SIGNIFICANT CHANGE UP (ref 6–8.3)
RBC # BLD: 3.67 M/UL — LOW (ref 3.8–5.2)
RBC # FLD: 16.9 % — HIGH (ref 10.3–14.5)
SODIUM SERPL-SCNC: 139 MMOL/L — SIGNIFICANT CHANGE UP (ref 135–145)
SPECIMEN SOURCE: SIGNIFICANT CHANGE UP
T PALLIDUM AB TITR SER: NEGATIVE — SIGNIFICANT CHANGE UP
TSH SERPL-MCNC: 2.31 UIU/ML — SIGNIFICANT CHANGE UP (ref 0.27–4.2)
VIT B12 SERPL-MCNC: 533 PG/ML — SIGNIFICANT CHANGE UP (ref 232–1245)
WBC # BLD: 14.38 K/UL — HIGH (ref 3.8–10.5)
WBC # FLD AUTO: 14.38 K/UL — HIGH (ref 3.8–10.5)

## 2018-03-08 PROCEDURE — 99223 1ST HOSP IP/OBS HIGH 75: CPT

## 2018-03-08 RX ORDER — HALOPERIDOL DECANOATE 100 MG/ML
1 INJECTION INTRAMUSCULAR ONCE
Qty: 0 | Refills: 0 | Status: COMPLETED | OUTPATIENT
Start: 2018-03-08 | End: 2018-03-08

## 2018-03-08 RX ORDER — OLANZAPINE 15 MG/1
2.5 TABLET, FILM COATED ORAL EVERY 6 HOURS
Qty: 0 | Refills: 0 | Status: DISCONTINUED | OUTPATIENT
Start: 2018-03-08 | End: 2018-03-09

## 2018-03-08 RX ORDER — QUETIAPINE FUMARATE 200 MG/1
50 TABLET, FILM COATED ORAL EVERY 6 HOURS
Qty: 0 | Refills: 0 | Status: DISCONTINUED | OUTPATIENT
Start: 2018-03-08 | End: 2018-03-09

## 2018-03-08 RX ORDER — BUDESONIDE AND FORMOTEROL FUMARATE DIHYDRATE 160; 4.5 UG/1; UG/1
2 AEROSOL RESPIRATORY (INHALATION)
Qty: 0 | Refills: 0 | Status: DISCONTINUED | OUTPATIENT
Start: 2018-03-08 | End: 2018-03-09

## 2018-03-08 RX ORDER — VALACYCLOVIR 500 MG/1
1 TABLET, FILM COATED ORAL
Qty: 0 | Refills: 0 | COMMUNITY

## 2018-03-08 RX ORDER — CLONAZEPAM 1 MG
0.5 TABLET ORAL ONCE
Qty: 0 | Refills: 0 | Status: DISCONTINUED | OUTPATIENT
Start: 2018-03-08 | End: 2018-03-08

## 2018-03-08 RX ORDER — CLONAZEPAM 1 MG
1 TABLET ORAL
Qty: 0 | Refills: 0 | COMMUNITY

## 2018-03-08 RX ORDER — CLOPIDOGREL BISULFATE 75 MG/1
75 TABLET, FILM COATED ORAL DAILY
Qty: 0 | Refills: 0 | Status: DISCONTINUED | OUTPATIENT
Start: 2018-03-08 | End: 2018-03-09

## 2018-03-08 RX ORDER — CLONAZEPAM 1 MG
1 TABLET ORAL DAILY
Qty: 0 | Refills: 0 | Status: DISCONTINUED | OUTPATIENT
Start: 2018-03-08 | End: 2018-03-08

## 2018-03-08 RX ORDER — QUETIAPINE FUMARATE 200 MG/1
100 TABLET, FILM COATED ORAL ONCE
Qty: 0 | Refills: 0 | Status: DISCONTINUED | OUTPATIENT
Start: 2018-03-08 | End: 2018-03-08

## 2018-03-08 RX ORDER — TIOTROPIUM BROMIDE 18 UG/1
1 CAPSULE ORAL; RESPIRATORY (INHALATION) DAILY
Qty: 0 | Refills: 0 | Status: DISCONTINUED | OUTPATIENT
Start: 2018-03-08 | End: 2018-03-09

## 2018-03-08 RX ORDER — CLONAZEPAM 1 MG
0.5 TABLET ORAL
Qty: 0 | Refills: 0 | Status: DISCONTINUED | OUTPATIENT
Start: 2018-03-09 | End: 2018-03-09

## 2018-03-08 RX ORDER — ENOXAPARIN SODIUM 100 MG/ML
40 INJECTION SUBCUTANEOUS EVERY 24 HOURS
Qty: 0 | Refills: 0 | Status: DISCONTINUED | OUTPATIENT
Start: 2018-03-08 | End: 2018-03-09

## 2018-03-08 RX ORDER — FAMOTIDINE 10 MG/ML
20 INJECTION INTRAVENOUS
Qty: 0 | Refills: 0 | Status: DISCONTINUED | OUTPATIENT
Start: 2018-03-08 | End: 2018-03-09

## 2018-03-08 RX ORDER — GABAPENTIN 400 MG/1
0 CAPSULE ORAL
Qty: 0 | Refills: 0 | COMMUNITY

## 2018-03-08 RX ORDER — TIOTROPIUM BROMIDE 18 UG/1
1 CAPSULE ORAL; RESPIRATORY (INHALATION) DAILY
Qty: 0 | Refills: 0 | Status: DISCONTINUED | OUTPATIENT
Start: 2018-03-08 | End: 2018-03-08

## 2018-03-08 RX ORDER — QUETIAPINE FUMARATE 200 MG/1
100 TABLET, FILM COATED ORAL THREE TIMES A DAY
Qty: 0 | Refills: 0 | Status: DISCONTINUED | OUTPATIENT
Start: 2018-03-08 | End: 2018-03-09

## 2018-03-08 RX ORDER — ATORVASTATIN CALCIUM 80 MG/1
20 TABLET, FILM COATED ORAL AT BEDTIME
Qty: 0 | Refills: 0 | Status: DISCONTINUED | OUTPATIENT
Start: 2018-03-08 | End: 2018-03-09

## 2018-03-08 RX ORDER — LANOLIN ALCOHOL/MO/W.PET/CERES
3 CREAM (GRAM) TOPICAL AT BEDTIME
Qty: 0 | Refills: 0 | Status: DISCONTINUED | OUTPATIENT
Start: 2018-03-08 | End: 2018-03-09

## 2018-03-08 RX ORDER — SERTRALINE 25 MG/1
100 TABLET, FILM COATED ORAL DAILY
Qty: 0 | Refills: 0 | Status: DISCONTINUED | OUTPATIENT
Start: 2018-03-08 | End: 2018-03-09

## 2018-03-08 RX ORDER — ONDANSETRON 8 MG/1
4 TABLET, FILM COATED ORAL EVERY 8 HOURS
Qty: 0 | Refills: 0 | Status: DISCONTINUED | OUTPATIENT
Start: 2018-03-08 | End: 2018-03-09

## 2018-03-08 RX ORDER — CLONAZEPAM 1 MG
1 TABLET ORAL AT BEDTIME
Qty: 0 | Refills: 0 | Status: DISCONTINUED | OUTPATIENT
Start: 2018-03-08 | End: 2018-03-09

## 2018-03-08 RX ORDER — METHADONE HYDROCHLORIDE 40 MG/1
10 TABLET ORAL
Qty: 0 | Refills: 0 | Status: DISCONTINUED | OUTPATIENT
Start: 2018-03-08 | End: 2018-03-09

## 2018-03-08 RX ORDER — METHADONE HYDROCHLORIDE 40 MG/1
10 TABLET ORAL ONCE
Qty: 0 | Refills: 0 | Status: DISCONTINUED | OUTPATIENT
Start: 2018-03-08 | End: 2018-03-08

## 2018-03-08 RX ORDER — ZILEUTON 600 MG/1
1200 TABLET, MULTILAYER, EXTENDED RELEASE ORAL
Qty: 0 | Refills: 0 | Status: DISCONTINUED | OUTPATIENT
Start: 2018-03-08 | End: 2018-03-09

## 2018-03-08 RX ADMIN — FAMOTIDINE 20 MILLIGRAM(S): 10 INJECTION INTRAVENOUS at 05:26

## 2018-03-08 RX ADMIN — SERTRALINE 100 MILLIGRAM(S): 25 TABLET, FILM COATED ORAL at 09:23

## 2018-03-08 RX ADMIN — Medication 0.5 MILLIGRAM(S): at 02:49

## 2018-03-08 RX ADMIN — HALOPERIDOL DECANOATE 1 MILLIGRAM(S): 100 INJECTION INTRAMUSCULAR at 01:31

## 2018-03-08 RX ADMIN — METHADONE HYDROCHLORIDE 10 MILLIGRAM(S): 40 TABLET ORAL at 09:26

## 2018-03-08 RX ADMIN — OLANZAPINE 2.5 MILLIGRAM(S): 15 TABLET, FILM COATED ORAL at 20:34

## 2018-03-08 RX ADMIN — ZILEUTON 1200 MILLIGRAM(S): 600 TABLET, MULTILAYER, EXTENDED RELEASE ORAL at 09:23

## 2018-03-08 RX ADMIN — QUETIAPINE FUMARATE 100 MILLIGRAM(S): 200 TABLET, FILM COATED ORAL at 09:24

## 2018-03-08 RX ADMIN — QUETIAPINE FUMARATE 50 MILLIGRAM(S): 200 TABLET, FILM COATED ORAL at 16:55

## 2018-03-08 RX ADMIN — Medication 10 MILLIGRAM(S): at 05:25

## 2018-03-08 RX ADMIN — QUETIAPINE FUMARATE 100 MILLIGRAM(S): 200 TABLET, FILM COATED ORAL at 23:12

## 2018-03-08 RX ADMIN — Medication 3 MILLIGRAM(S): at 23:12

## 2018-03-08 RX ADMIN — METHADONE HYDROCHLORIDE 10 MILLIGRAM(S): 40 TABLET ORAL at 17:45

## 2018-03-08 RX ADMIN — FAMOTIDINE 20 MILLIGRAM(S): 10 INJECTION INTRAVENOUS at 09:24

## 2018-03-08 RX ADMIN — ZILEUTON 1200 MILLIGRAM(S): 600 TABLET, MULTILAYER, EXTENDED RELEASE ORAL at 02:49

## 2018-03-08 RX ADMIN — ZILEUTON 1200 MILLIGRAM(S): 600 TABLET, MULTILAYER, EXTENDED RELEASE ORAL at 23:28

## 2018-03-08 RX ADMIN — BUDESONIDE AND FORMOTEROL FUMARATE DIHYDRATE 2 PUFF(S): 160; 4.5 AEROSOL RESPIRATORY (INHALATION) at 17:45

## 2018-03-08 RX ADMIN — Medication 1 MILLIGRAM(S): at 23:13

## 2018-03-08 RX ADMIN — TIOTROPIUM BROMIDE 1 CAPSULE(S): 18 CAPSULE ORAL; RESPIRATORY (INHALATION) at 09:24

## 2018-03-08 RX ADMIN — CLOPIDOGREL BISULFATE 75 MILLIGRAM(S): 75 TABLET, FILM COATED ORAL at 09:24

## 2018-03-08 RX ADMIN — Medication 1 MILLIGRAM(S): at 09:24

## 2018-03-08 RX ADMIN — METHADONE HYDROCHLORIDE 10 MILLIGRAM(S): 40 TABLET ORAL at 14:00

## 2018-03-08 RX ADMIN — ATORVASTATIN CALCIUM 20 MILLIGRAM(S): 80 TABLET, FILM COATED ORAL at 23:12

## 2018-03-08 RX ADMIN — METHADONE HYDROCHLORIDE 10 MILLIGRAM(S): 40 TABLET ORAL at 01:53

## 2018-03-08 RX ADMIN — QUETIAPINE FUMARATE 100 MILLIGRAM(S): 200 TABLET, FILM COATED ORAL at 05:25

## 2018-03-08 RX ADMIN — ENOXAPARIN SODIUM 40 MILLIGRAM(S): 100 INJECTION SUBCUTANEOUS at 05:25

## 2018-03-08 NOTE — H&P ADULT - PROBLEM SELECTOR PLAN 3
c/w home dose sertraline No fever, CXR normal, UA normal  no suspicion for infectious cause at this time  may be due to steroid use  continue to monitor off abx for now

## 2018-03-08 NOTE — H&P ADULT - NSHPPHYSICALEXAM_GEN_ALL_CORE
PHYSICAL EXAM:  Vital Signs Last 24 Hrs  T(C): 37.3 (03-08-18 @ 00:46)  T(F): 99.2 (03-08-18 @ 00:46), Max: 99.2 (03-08-18 @ 00:46)  HR: 95 (03-07-18 @ 20:16) (95 - 95)  BP: 158/81 (03-07-18 @ 20:16)  BP(mean): --  RR: 18 (03-07-18 @ 20:16) (18 - 18)  SpO2: 98% (03-07-18 @ 20:16) (98% - 98%)  Wt(kg): --    Constitutional: NAD, awake but agitated  EYES: pinpoint pupils  ENT:  no tonsillar exudates   Neck: Soft and supple, No JVD  Lungs: Breath sounds are clear bilaterally, No wheezing, rales or rhonchi  Heart: S1 and S2, regular rate and rhythm, no Murmurs, gallops or rubs  Abdomen: Bowel Sounds present, soft, nontender, nondistended, no guarding, no rebound  Extremities: No cyanosis or clubbing; warm to touch  Vascular: 2+ peripheral pulses lower ex  Neurological: A/O x 2, no focal deficits  Musculoskeletal: 5/5 strength b/l upper and lower extremities, though limited due to lack of effort inability to carry out directions  Skin: No rashes  Psych: dementia, agitation  HEME: no bruises, no nose bleeds

## 2018-03-08 NOTE — BEHAVIORAL HEALTH ASSESSMENT NOTE - PAST PSYCHOTROPIC MEDICATION
The patient has been working with her physician to adjust a medication regiment that will help alleviate her agitation symptoms. For the last three months, she has been on Seroquel 100 tid, Klonapin 0.5qd/1qd, and zoloft 100mg with questionable improvement. She had a failed trial of Depakote several months ago. In addition, the patient had received one dose of haloperidol in the ED which the family believes exacerbated her agitation and they refuse another trial.

## 2018-03-08 NOTE — H&P ADULT - FAMILY HISTORY
Mother  Still living? Unknown  Family history of colon cancer, Age at diagnosis: Age Unknown     Father  Still living? Unknown  Family history of dementia, Age at diagnosis: Age Unknown

## 2018-03-08 NOTE — BEHAVIORAL HEALTH ASSESSMENT NOTE - SUMMARY
66 yo female, , domiciled at home with family, no past psychiatric history, with moderate to severe cognitive dysfunction due to Alzheimer's dementia, CVA, Asthma on chronic steroid, chronic back pain due to bulging disc on methadone, HLD, acid reflux, presents here with worsening back pain. Psychiatry was consulted for multiple episodes of agitation while in the ED. The patient was found in moderate distressed, alert but not oriented, attempting to climb out of her bed. The patient's medical history is consistent with a severe cognitive impairment secondary to early onset Alzheimer's dementia. At baseline, the patient is non-conversational and unable to ambulate or care for herself independently. The patient's family describes worsening episodes of agitation and restlessness during the last 3 months for which they require assistance both at home and in the hospital. Given context of patient's severe uncontrolled back pain, worsening aggression possibly due to behavioral response to pain. 68 yo female, , domiciled at home with family, no past psychiatric history, with moderate to severe cognitive dysfunction due to Alzheimer's dementia, CVA, Asthma on chronic steroid, chronic back pain due to bulging disc on methadone, HLD, acid reflux, presents here with worsening back pain. Psychiatry was consulted for multiple episodes of agitation while in the ED. The patient was found in moderate distress, alert but not oriented, attempting to climb out of her bed. The patient's medical history is consistent with a severe cognitive impairment secondary to early onset Alzheimer's dementia. At baseline, the patient is non-conversational and unable to ambulate or care for herself independently. The patient's family describes worsening episodes of agitation and restlessness during the last 3 months for which they require assistance both at home and in the hospital. Given context of patient's severe uncontrolled back pain, worsening aggression possibly due to behavioral response to pain.

## 2018-03-08 NOTE — H&P ADULT - NSHPSOCIALHISTORY_GEN_ALL_CORE
does not smoke, does not drink alcohol, lives with  and kids  at baseline, she ambulates, but with unsteady gait, requires assistance.  She refuses to use walker

## 2018-03-08 NOTE — BEHAVIORAL HEALTH ASSESSMENT NOTE - NSBHCONSULTMEDAGITATION_PSY_A_CORE FT
Seroquel 50mg PO q6hr PRN agitation. If pt unable to take oral, could try Zyprexa 2.5mg IM q6hr prn for severe agitation only

## 2018-03-08 NOTE — H&P ADULT - PROBLEM SELECTOR PLAN 5
c/w lipitor advair  prednisone  spiriva  antileukotriene (confirm medication; may need to take home med)

## 2018-03-08 NOTE — H&P ADULT - HISTORY OF PRESENT ILLNESS
66 yo female with moderate to severe cognitive dysfunction due to Alzheimer's dementia, CVA, Asthma on chronic steroid, chronic anxiety disorder, chronic back pain due to bulging disc on methadone, HLD, acid reflux, presents here with worsening back pain.  History is obtained from  and daughters.  As per family, patient has been having worsening back pain over last few days.   She has been becoming more unsteady on her feet due to pain.  Over the last few days, patient has been constantly trying to get out of bed, restless, pacing herself.  However, family states she has been unsteady and therefore she under chronic observation by family members.  She does not have any urinary incontinence.  She had a few episodes of stool leakage, but generally constipated and requires lexatives.  She had a normal BM yesterday.  She is brought in today because patient has been having worsening back pain, very restless, walking unsteadily, requiring increasing assistance.  Patient otherwise had no vomiting, abdominal pain, diarrhea, urinary complaints.  She also had no fever, chills, cough, runny nose, sick contact.  Patient has low appetite, does not eat well, has lost about 50lb since last May 2017.  While in ED, patient was very agitated, keeps trying to climb out of bed.  She received 1mg klonopin and then 0.5mg klonopin and then seroquel 100mg and then haldol 1mg IV.  Currently on my evaluation, she is intermittently trying to get out of bed.  She is on 1:1.  She is AAO x 2 but does not follow all commands.

## 2018-03-08 NOTE — H&P ADULT - ASSESSMENT
68 yo female with moderate to severe cognitive dysfunction due to Alzheimer's dementia, CVA, Asthma on chronic steroid, chronic anxiety disorder, chronic back pain due to bulging disc on methadone, HLD, acid reflux, presents here with worsening back pain.

## 2018-03-08 NOTE — CONSULT NOTE ADULT - SUBJECTIVE AND OBJECTIVE BOX
Patient is a 67y old  Female who presents with a chief complaint of back pain (08 Mar 2018 01:46)    HPI:  68 yo female with moderate to severe cognitive dysfunction due to Alzheimer's dementia, CVA, Asthma on chronic steroid, chronic anxiety disorder, chronic back pain due to bulging disc on methadone, HLD, acid reflux, presents here with worsening back pain. Patient is a difficult historian, however she states that her back pain has been increasing over the week, radiating to her b/l LE, constant, severe with a sharp quality, not improved with any activity, no numbness.     PAST MEDICAL & SURGICAL HISTORY:  CVA (cerebral vascular accident)  Fatty pancreas  PNA (pneumonia)  Pulmonary HTN  IGT (impaired glucose tolerance)  Ulcerative colitis  Acid reflux  Anxiety  Depression  Mouth sores  HLD (hyperlipidemia)  Asthma  Humeral head fracture  H/O: hysterectomy  H/O cataract extraction, left  History of knee replacement    FAMILY HISTORY:  Family history of dementia (Father)  Family history of colon cancer (Mother)    Social Hx:  Nonsmoker, no drug or alcohol use  MEDICATIONS  (STANDING):  atorvastatin 20 milliGRAM(s) Oral at bedtime  buDESOnide 160 MICROgram(s)/formoterol 4.5 MICROgram(s) Inhaler 2 Puff(s) Inhalation two times a day  clonazePAM Tablet 1 milliGRAM(s) Oral at bedtime  clonazePAM Tablet 1 milliGRAM(s) Oral daily  clopidogrel Tablet 75 milliGRAM(s) Oral daily  enoxaparin Injectable 40 milliGRAM(s) SubCutaneous every 24 hours  famotidine    Tablet 20 milliGRAM(s) Oral two times a day  methadone    Tablet 10 milliGRAM(s) Oral three times a day before meals  predniSONE   Tablet 10 milliGRAM(s) Oral daily  QUEtiapine 100 milliGRAM(s) Oral three times a day  sertraline 100 milliGRAM(s) Oral daily  tiotropium 18 MICROgram(s) Capsule 1 Capsule(s) Inhalation daily  zileuton 1200 milliGRAM(s) Oral two times a day    MEDICATIONS  (PRN):  ondansetron    Tablet 4 milliGRAM(s) Oral every 8 hours PRN Nausea and/or Vomiting    Allergies    ASA; dye contrast (Anaphylaxis)  aspirin (Short breath)  penicillin (Short breath; Rash)  sulfa drugs (Short breath; Rash)    Intolerances      ROS:  all other ROS were reviewed and are negative.    Vital Signs Last 24 Hrs  T(C): 36.6 (08 Mar 2018 09:18), Max: 37.3 (08 Mar 2018 00:46)  T(F): 97.8 (08 Mar 2018 09:18), Max: 99.2 (08 Mar 2018 00:46)  HR: 114 (08 Mar 2018 09:18) (95 - 114)  BP: 164/101 (08 Mar 2018 09:18) (158/81 - 164/101)  BP(mean): --  RR: 18 (08 Mar 2018 09:18) (18 - 18)  SpO2: 98% (08 Mar 2018 09:18) (98% - 98%)  GENERAL EXAM:  Constitutional: awake and alert. NAD  HEENT: NCAT  Neck: Supple  Respiratory: Breath sounds are clear bilaterally  Cardiovascular: S1 and S2, regular rhythm  Gastrointestinal: soft, nontender  Extremities: no edema, no cyanosis  Vascular: no carotid bruits  Musculoskeletal: no joint swelling/tenderness, no abnormal movements  Skin: no rashes  NEUROLOGICAL EXAM:  MS: Awake,alert, not oriented to date, poor concentration, recall, poor fund of knowledge  CN: VFF, EOMI, PERRL, no LUIS, no APD,  V1-3 intact, no facial asymmetry, t/p midline, SCM/trap intact.  Fundi: no papilledema.  Motor: Strength: ALLAN with good strength, poorly compliant with exam. Tone: normal. Bulk: decreased. DTR 2+ symm.  Plantar flex b/l. Sensation: intact to LT/PP, though unreliable  Coordination: unreliable  Gait:  unable to assess    Labs:                         9.8    14.38 )-----------( 326      ( 08 Mar 2018 07:35 )             31.2     03-08    139  |  100  |  15  ----------------------------<  76  3.5   |  25  |  0.62    Ca    8.5      08 Mar 2018 06:05  Phos  2.4     03-08  Mg     1.5     03-08    TPro  6.5  /  Alb  3.7  /  TBili  0.5  /  DBili  x   /  AST  25  /  ALT  17  /  AlkPhos  81  03-08    PT/INR - ( 07 Mar 2018 20:45 )   PT: 10.7 sec;   INR: 0.98 ratio         PTT - ( 07 Mar 2018 20:45 )  PTT:29.5 sec          CAPILLARY BLOOD GLUCOSE        LIVER FUNCTIONS - ( 08 Mar 2018 06:05 )  Alb: 3.7 g/dL / Pro: 6.5 g/dL / ALK PHOS: 81 U/L / ALT: 17 U/L RC / AST: 25 U/L / GGT: x           Urinalysis Basic - ( 07 Mar 2018 20:44 )    Color: Yellow / Appearance: Clear / S.027 / pH: x  Gluc: x / Ketone: Negative  / Bili: Negative / Urobili: 1 mg/dL   Blood: x / Protein: Trace / Nitrite: Negative   Leuk Esterase: Negative / RBC: x / WBC x   Sq Epi: x / Non Sq Epi: x / Bacteria: x      Radiology:  -CT Head:  -MRI brain  -MRA brain/Carotids  -EEG

## 2018-03-08 NOTE — H&P ADULT - PROBLEM SELECTOR PLAN 1
Patient with reported worsening back pain, though does not verbalize that complaint at this time.  She may be agitated and restless due to pain.  Will restart patient's home dose of methadone.  Moving all extremities spontaneously, 5/5 strength (though limited exam).  No reported urinary or bowel incontinence.  No suspicion for cord compression at this time.  Reported weakness and unsteadiness possibly due to worsening of back pain.  Consider adding oxy IR   PT evaluation  monitor QT closely, repeat EKG in AM as patient in methadone and neuroleptics  was no magnesium supplements at home, unknown dose; will check magnesium level Patient with reported worsening back pain, though does not verbalize that complaint at this time.  She may be agitated and restless due to pain.  Will restart patient's home dose of methadone.  Moving all extremities spontaneously, 5/5 strength (though limited exam).  No reported urinary or bowel incontinence.  No suspicion for cord compression at this time.  Reported weakness and unsteadiness possibly due to worsening of back pain.  Consider adding oxy IR   PT evaluation  monitor QT closely, repeat EKG in AM as patient in methadone and neuroleptics  was no magnesium supplements at home, unknown dose; will check magnesium level  check b12, RPR, TSH

## 2018-03-08 NOTE — BEHAVIORAL HEALTH ASSESSMENT NOTE - DESCRIPTION
Alzheimer's dementia, CVA, Asthma on chronic steroid, chronic back pain due to bulging disc on methadone, HLD, acid reflux,

## 2018-03-08 NOTE — CHART NOTE - NSCHARTNOTEFT_GEN_A_CORE
called by RN pt see pt due to agitation.  Pt had received Klonopin 1.5 mg, Seroquel 100 mg and Haldol 1 mg IV earlier, but still agitated. Pt on 1:1 for safety. Will continue with 1:1.  Per family pt became more agitated after Haldol, so will hold haldol. Will give Klonopin 0.5mg PO X 1. Will continue to monitor pt. Will call psych to see pt in AM.     [place]  [phone]

## 2018-03-08 NOTE — CONSULT NOTE ADULT - ASSESSMENT
pt is very confused and agitated but no signs of meningitis or CNS infection. She has back pain and it would be of value to consider an MRI of thoracic lumbar spine if her agitation can be controlled. At present agree with holding antibiotics.

## 2018-03-08 NOTE — BEHAVIORAL HEALTH ASSESSMENT NOTE - NSBHCONSULTMEDS_PSY_A_CORE FT
Melatonin 3mg po qhs for sleep  Seroquel 100 po tid  Klonapin 0.5mg po qd  Klonapin 0.5mg po qhs   Sertrailine 100mg po Melatonin 3mg po qhs for sleep  Seroquel 100 po tid  Klonopin 0.5mg po qd  Klonopin 1mg po qhs   Sertrailine 100mg po

## 2018-03-08 NOTE — H&P ADULT - PROBLEM SELECTOR PLAN 2
Likely due to worsening pain and/or worsening dementia  s/p seroquel, haldol, klonopin in ED  will increase dose of klonopin to 1mg bid  c/w home dose seroquel  would hold further haldol  monitor QT closely, repeat EKG in AM  was no magnesium supplements at home, unknown dose; will check magnesium level  c/w 1:1 for now

## 2018-03-08 NOTE — CONSULT NOTE ADULT - SUBJECTIVE AND OBJECTIVE BOX
Patient is a 67y old  Female who presents with a chief complaint of back pain (08 Mar 2018 01:46)    HPI:  66 yo female with moderate to severe cognitive dysfunction due to Alzheimer's dementia, CVA, Asthma on chronic steroid, chronic anxiety disorder, chronic back pain due to bulging disc on methadone, HLD, acid reflux, presents here with worsening back pain.  History is obtained from  and daughters.  As per family, patient has been having worsening back pain over last few days.   She has been becoming more unsteady on her feet due to pain.  Over the last few days, patient has been constantly trying to get out of bed, restless, pacing herself.  However, family states she has been unsteady and therefore she under chronic observation by family members.  She does not have any urinary incontinence.  She had a few episodes of stool leakage, but generally constipated and requires lexatives.  She had a normal BM yesterday.  She is brought in today because patient has been having worsening back pain, very restless, walking unsteadily, requiring increasing assistance.  Patient otherwise had no vomiting, abdominal pain, diarrhea, urinary complaints.  She also had no fever, chills, cough, runny nose, sick contact.  Patient has low appetite, does not eat well, has lost about 50lb since last May 2017.  While in ED, patient was very agitated, keeps trying to climb out of bed.  She received 1mg klonopin and then 0.5mg klonopin and then seroquel 100mg and then haldol 1mg IV.  Currently on my evaluation, she is intermittently trying to get out of bed.  She is on 1:1.  She is AAO x 2 but does not follow all commands. (08 Mar 2018 01:46)      PAST MEDICAL & SURGICAL HISTORY:  CVA (cerebral vascular accident)  Fatty pancreas  PNA (pneumonia)  Pulmonary HTN  IGT (impaired glucose tolerance)  Ulcerative colitis  Acid reflux  Anxiety  Depression  Mouth sores  HLD (hyperlipidemia)  Asthma  Humeral head fracture  H/O: hysterectomy  H/O cataract extraction, left  History of knee replacement      Social history:    FAMILY HISTORY:  Family history of dementia (Father)  Family history of colon cancer (Mother)    R     G e    Allergic/Immunologic:	No hives or rash   Allergies    ASA; dye contrast (Anaphylaxis)  aspirin (Short breath)  penicillin (Short breath; Rash)  sulfa drugs (Short breath; Rash)    Intolerances        Antimicrobials:          Vital Signs Last 24 Hrs  T(C): 36.6 (08 Mar 2018 09:18), Max: 37.3 (08 Mar 2018 00:46)  T(F): 97.8 (08 Mar 2018 09:18), Max: 99.2 (08 Mar 2018 00:46)  HR: 114 (08 Mar 2018 09:18) (95 - 114)  BP: 164/101 (08 Mar 2018 09:18) (158/81 - 164/101)  BP(mean): --  RR: 18 (08 Mar 2018 09:18) (18 - 18)  SpO2: 98% (08 Mar 2018 09:18) (98% - 98%)    PHYSICAL EXAM:Pleasant patient in no acute distress.      Constitutional:Comfortable.Awake and alert  No cachexia     Eyes:PERRL EOMI.NO discharge or conjunctival injection    ENMT:No sinus tenderness.No thrush.No pharyngeal exudate or erythema.Fair dental hygiene    Neck:Supple,No LN,no JVD          Cardiovascular:S1 S2 wnl, No murmurs,rub or gallops    Gastrointestinal:Soft BS(+) no tenderness no masses ,No rebound or guarding    Genitourinary:No CVA tendereness     Rectal:    Extremities:No cyanosis,clubbing or edema.    Vascular:peripheral pulses felt    Neurological:AAO X 3,No grossly focal deficits    Skin:No rash     Lymph Nodes:No palpable LNs    Musculoskeletal:No joint swelling or LOM                                     9.8    14.38 )-----------( 326      ( 08 Mar 2018 07:35 )             31.2         03-08    139  |  100  |  15  ----------------------------<  76  3.5   |  25  |  0.62    Ca    8.5      08 Mar 2018 06:05  Phos  2.4     03-08  Mg     1.5     03-08    TPro  6.5  /  Alb  3.7  /  TBili  0.5  /  DBili  x   /  AST  25  /  ALT  17  /  AlkPhos  81  03-08      RECENT CULTURES:      MICROBIOLOGY:          Radiology:      Assessment:        Recommendations and Plan:    Pager [phone]  After 5 pm/weekends or if no response :[phone]

## 2018-03-08 NOTE — CONSULT NOTE ADULT - SUBJECTIVE AND OBJECTIVE BOX
Chief Complaint: Back pain    HPI:  Patient is a 68 y/o female with history of severe Alzheimer's dementia who presents with chronic back pain. During consultation, patient is sleeping comfortably in bed.  at bedside furnished information.   She follows up for outpatient Neurology, Dr. Cheek for treatment and Dr. De Leon for medication management which consists of Methadone 10mg po tid for chronic back pain. She lives with her busband and has 2 daughters who help support her activities of daily living.   PAST MEDICAL & SURGICAL HISTORY:  CVA (cerebral vascular accident)  Fatty pancreas  PNA (pneumonia)  Pulmonary HTN  IGT (impaired glucose tolerance)  Ulcerative colitis  Acid reflux  Anxiety  Depression  Mouth sores  HLD (hyperlipidemia)  Asthma  Humeral head fracture  H/O: hysterectomy  H/O cataract extraction, left  History of knee replacement      FAMILY HISTORY:  Family history of dementia (Father)  Family history of colon cancer (Mother)      SOCIAL HISTORY:  [ ] Denies Smoking, Alcohol, or Drug Use    Allergies    ASA; dye contrast (Anaphylaxis)  aspirin (Short breath)  penicillin (Short breath; Rash)  sulfa drugs (Short breath; Rash)    Intolerances        PAIN MEDICATIONS:  clonazePAM Tablet 1 milliGRAM(s) Oral at bedtime  melatonin 3 milliGRAM(s) Oral at bedtime  methadone    Tablet 10 milliGRAM(s) Oral three times a day before meals  OLANZapine Injectable 2.5 milliGRAM(s) IntraMuscular every 6 hours PRN  ondansetron    Tablet 4 milliGRAM(s) Oral every 8 hours PRN  QUEtiapine 50 milliGRAM(s) Oral every 6 hours PRN  QUEtiapine 100 milliGRAM(s) Oral three times a day  sertraline 100 milliGRAM(s) Oral daily    Heme:  clopidogrel Tablet 75 milliGRAM(s) Oral daily  enoxaparin Injectable 40 milliGRAM(s) SubCutaneous every 24 hours    Antibiotics:    Cardiovascular:    GI:  famotidine    Tablet 20 milliGRAM(s) Oral two times a day    Endocrine:  atorvastatin 20 milliGRAM(s) Oral at bedtime  predniSONE   Tablet 10 milliGRAM(s) Oral daily    All Other Medications    Vital Signs Last 24 Hrs  T(C): 36.4 (08 Mar 2018 13:51), Max: 37.3 (08 Mar 2018 00:46)  T(F): 97.6 (08 Mar 2018 13:51), Max: 99.2 (08 Mar 2018 00:46)  HR: 93 (08 Mar 2018 13:51) (93 - 114)  BP: 114/75 (08 Mar 2018 13:51) (114/75 - 164/101)  BP(mean): --  RR: 18 (08 Mar 2018 13:51) (18 - 18)  SpO2: 75% (08 Mar 2018 13:51) (75% - 98%)    PAIN SCORE:    6     SCALE USED: (1-10 VNRS)             PHYSICAL EXAM:    GENERAL: NAD, thin, sleeping comfortably  HEAD:  Atraumatic, Normocephalic  CHEST/LUNG: Chest wall rise equal and b/l  HEART: Regular rate     LABS:                          9.8    14.38 )-----------( 326      ( 08 Mar 2018 07:35 )             31.2     03-08    139  |  100  |  15  ----------------------------<  76  3.5   |  25  |  0.62    Ca    8.5      08 Mar 2018 06:05  Phos  2.4     03-08  Mg     1.5     03-08    TPro  6.5  /  Alb  3.7  /  TBili  0.5  /  DBili  x   /  AST  25  /  ALT  17  /  AlkPhos  81  03-08    PT/INR - ( 07 Mar 2018 20:45 )   PT: 10.7 sec;   INR: 0.98 ratio         PTT - ( 07 Mar 2018 20:45 )  PTT:29.5 sec  Urinalysis Basic - ( 07 Mar 2018 20:44 )    Color: Yellow / Appearance: Clear / S.027 / pH: x  Gluc: x / Ketone: Negative  / Bili: Negative / Urobili: 1 mg/dL   Blood: x / Protein: Trace / Nitrite: Negative   Leuk Esterase: Negative / RBC: x / WBC x   Sq Epi: x / Non Sq Epi: x / Bacteria: x        RADIOLOGY:    Drug Screen:            [ ]  [place]  Reviewed and Copied to Chart

## 2018-03-08 NOTE — BEHAVIORAL HEALTH ASSESSMENT NOTE - HPI (INCLUDE ILLNESS QUALITY, SEVERITY, DURATION, TIMING, CONTEXT, MODIFYING FACTORS, ASSOCIATED SIGNS AND SYMPTOMS)
68 yo female, , domiciled at home with family, no past psychiatric history, with moderate to severe cognitive dysfunction due to Alzheimer's dementia, CVA, Asthma on chronic steroid, chronic back pain due to bulging disc on methadone, HLD, acid reflux, presents here with worsening back pain. Psychiatry was consulted for multiple episodes of agitation while in the ED.  The patient was found lying in bed in moderate distress from her back pain breathing heavily. She was alert but not oriented, unable to recite her name. She did not respond to any questions and was making attempts to climb out of her bed independently.   Collateral history was obtained from the patient’s , Caleb ([phone]). According to him, the patient was first diagnosed with Alzheimer’s Dementia in 2014 and had been progressively deteriorating in cognitive function. In addition, the patient has bulging disks causing her excruciating pain, for which she takes methadone under supervision of a pain doctor. The  believes that the combination of physical symptoms and her underlying dementia has accelerated her agitation during the last 3-6 months. She has never been violent during episodes of agitation, instead making attempts to move throughout the house. She is very restless at baseline and prefers to pace around her home and climb her stairs, refusing to use a walker and requiring assistance from her family members to prevent from falling. She is not conversational, communicating mostly through gestures or phrases with her family members. She occasionally is found during the course of a crying spell, but the patient does not know why she is crying. The family has been noticing increasing frequencies of both urinary and fecal incontinence. She has lost 50lbs (currently weighing 75lbs) since last May due to refusal of most foods, resorting to primarily milkshakes for caloric intake. It is common for her to stay up throughout the night. She needs constant supervision at all times from her family, but they are hesitant to place her in a facility at this time. The family denies any history of self-harm, SI, HI, or desire to harm others.

## 2018-03-08 NOTE — H&P ADULT - PROBLEM SELECTOR PLAN 4
advair  prednisone  spiriva  antileukotriene (confirm medication; may need to take home med) c/w home dose sertraline

## 2018-03-08 NOTE — H&P ADULT - NSHPLABSRESULTS_GEN_ALL_CORE
Labs personally reviewed:                          9.8    14.6  )-----------( 273      ( 07 Mar 2018 20:44 )             30.5         141  |  105  |  22  ----------------------------<  202<H>  4.8   |  20<L>  |  0.71    Ca    8.5      07 Mar 2018 20:44    TPro  6.2  /  Alb  3.5  /  TBili  0.2  /  DBili  x   /  AST  32  /  ALT  18  /  AlkPhos  83          LIVER FUNCTIONS - ( 07 Mar 2018 20:44 )  Alb: 3.5 g/dL / Pro: 6.2 g/dL / ALK PHOS: 83 U/L / ALT: 18 U/L RC / AST: 32 U/L / GGT: x           PT/INR - ( 07 Mar 2018 20:45 )   PT: 10.7 sec;   INR: 0.98 ratio         PTT - ( 07 Mar 2018 20:45 )  PTT:29.5 sec  Urinalysis Basic - ( 07 Mar 2018 20:44 )    Color: Yellow / Appearance: Clear / S.027 / pH: x  Gluc: x / Ketone: Negative  / Bili: Negative / Urobili: 1 mg/dL   Blood: x / Protein: Trace / Nitrite: Negative   Leuk Esterase: Negative / RBC: x / WBC x   Sq Epi: x / Non Sq Epi: x / Bacteria: x      CAPILLARY BLOOD GLUCOSE          Imaging:  CXR personally reviewed: no focal opacity    EKG personally reviewed: nsr, PVCs, QTc 439

## 2018-03-08 NOTE — CONSULT NOTE ADULT - PROBLEM SELECTOR RECOMMENDATION 9
Continue care.  Methadone 10mg po tid for chronic pain (baseline medication). She follows up with Dr. De Leon and Tana Kilgore for outpatient pain medications.   - Consider Tylenol 1gram IV q8hr prn pain if no contraindications as per primary team.   istop verified, on chart ref # [id number]

## 2018-03-08 NOTE — CONSULT NOTE ADULT - ASSESSMENT
66yo F with dementia admitted with increasing LBP, r/o fracture, ro herniated disk/spinal stenosis  1. MRI LS, if unable to obtain then would do CT LS  2. PT  3. pain mgt eval

## 2018-03-08 NOTE — H&P ADULT - NSHPREVIEWOFSYSTEMS_GEN_ALL_CORE
CONSTITUTIONAL: unreliable due to clinical condition  EYES/ENT: unreliable due to clinical condition  NECK: unreliable due to clinical condition  RESPIRATORY: unreliable due to clinical condition  CARDIOVASCULAR: unreliable due to clinical condition  EXTREMITIES: unreliable due to clinical condition  MUSCULOSKELETAL: unreliable due to clinical condition  GASTROINTESTINAL: constipated  BACK: + back pain  GENITOURINARY: unreliable due to clinical condition  NEUROLOGICAL: unreliable due to clinical condition  SKIN: unreliable due to clinical condition  PSYCH: +agitation  All other review of systems is negative unless indicated above.

## 2018-03-09 ENCOUNTER — TRANSCRIPTION ENCOUNTER (OUTPATIENT)
Age: 68
End: 2018-03-09

## 2018-03-09 VITALS
DIASTOLIC BLOOD PRESSURE: 62 MMHG | SYSTOLIC BLOOD PRESSURE: 108 MMHG | TEMPERATURE: 98 F | HEART RATE: 90 BPM | RESPIRATION RATE: 18 BRPM | OXYGEN SATURATION: 89 %

## 2018-03-09 DIAGNOSIS — F03.91 UNSPECIFIED DEMENTIA WITH BEHAVIORAL DISTURBANCE: ICD-10-CM

## 2018-03-09 LAB
ANION GAP SERPL CALC-SCNC: 13 MMOL/L — SIGNIFICANT CHANGE UP (ref 5–17)
BUN SERPL-MCNC: 17 MG/DL — SIGNIFICANT CHANGE UP (ref 7–23)
CALCIUM SERPL-MCNC: 8.7 MG/DL — SIGNIFICANT CHANGE UP (ref 8.4–10.5)
CHLORIDE SERPL-SCNC: 101 MMOL/L — SIGNIFICANT CHANGE UP (ref 96–108)
CO2 SERPL-SCNC: 27 MMOL/L — SIGNIFICANT CHANGE UP (ref 22–31)
CREAT SERPL-MCNC: 0.76 MG/DL — SIGNIFICANT CHANGE UP (ref 0.5–1.3)
GLUCOSE SERPL-MCNC: 73 MG/DL — SIGNIFICANT CHANGE UP (ref 70–99)
HCT VFR BLD CALC: 37.5 % — SIGNIFICANT CHANGE UP (ref 34.5–45)
HGB BLD-MCNC: 11.7 G/DL — SIGNIFICANT CHANGE UP (ref 11.5–15.5)
MAGNESIUM SERPL-MCNC: 1.6 MG/DL — SIGNIFICANT CHANGE UP (ref 1.6–2.6)
MCHC RBC-ENTMCNC: 26.7 PG — LOW (ref 27–34)
MCHC RBC-ENTMCNC: 31.2 GM/DL — LOW (ref 32–36)
MCV RBC AUTO: 85.4 FL — SIGNIFICANT CHANGE UP (ref 80–100)
PHOSPHATE SERPL-MCNC: 4 MG/DL — SIGNIFICANT CHANGE UP (ref 2.5–4.5)
PLATELET # BLD AUTO: 369 K/UL — SIGNIFICANT CHANGE UP (ref 150–400)
POTASSIUM SERPL-MCNC: 3.3 MMOL/L — LOW (ref 3.5–5.3)
POTASSIUM SERPL-SCNC: 3.3 MMOL/L — LOW (ref 3.5–5.3)
RBC # BLD: 4.39 M/UL — SIGNIFICANT CHANGE UP (ref 3.8–5.2)
RBC # FLD: 17.1 % — HIGH (ref 10.3–14.5)
SODIUM SERPL-SCNC: 141 MMOL/L — SIGNIFICANT CHANGE UP (ref 135–145)
WBC # BLD: 11.33 K/UL — HIGH (ref 3.8–10.5)
WBC # FLD AUTO: 11.33 K/UL — HIGH (ref 3.8–10.5)

## 2018-03-09 PROCEDURE — 80048 BASIC METABOLIC PNL TOTAL CA: CPT

## 2018-03-09 PROCEDURE — 99285 EMERGENCY DEPT VISIT HI MDM: CPT | Mod: 25

## 2018-03-09 PROCEDURE — 85610 PROTHROMBIN TIME: CPT

## 2018-03-09 PROCEDURE — 84100 ASSAY OF PHOSPHORUS: CPT

## 2018-03-09 PROCEDURE — 94640 AIRWAY INHALATION TREATMENT: CPT

## 2018-03-09 PROCEDURE — 81003 URINALYSIS AUTO W/O SCOPE: CPT

## 2018-03-09 PROCEDURE — 71045 X-RAY EXAM CHEST 1 VIEW: CPT

## 2018-03-09 PROCEDURE — 82607 VITAMIN B-12: CPT

## 2018-03-09 PROCEDURE — 84443 ASSAY THYROID STIM HORMONE: CPT

## 2018-03-09 PROCEDURE — 87086 URINE CULTURE/COLONY COUNT: CPT

## 2018-03-09 PROCEDURE — 83735 ASSAY OF MAGNESIUM: CPT

## 2018-03-09 PROCEDURE — 93005 ELECTROCARDIOGRAM TRACING: CPT

## 2018-03-09 PROCEDURE — 80053 COMPREHEN METABOLIC PANEL: CPT

## 2018-03-09 PROCEDURE — 86780 TREPONEMA PALLIDUM: CPT

## 2018-03-09 PROCEDURE — 99232 SBSQ HOSP IP/OBS MODERATE 35: CPT

## 2018-03-09 PROCEDURE — 85027 COMPLETE CBC AUTOMATED: CPT

## 2018-03-09 PROCEDURE — 85730 THROMBOPLASTIN TIME PARTIAL: CPT

## 2018-03-09 RX ORDER — POTASSIUM CHLORIDE 20 MEQ
40 PACKET (EA) ORAL ONCE
Qty: 0 | Refills: 0 | Status: COMPLETED | OUTPATIENT
Start: 2018-03-09 | End: 2018-03-09

## 2018-03-09 RX ORDER — ATORVASTATIN CALCIUM 80 MG/1
1 TABLET, FILM COATED ORAL
Qty: 0 | Refills: 0 | COMMUNITY
Start: 2018-03-09

## 2018-03-09 RX ORDER — QUETIAPINE FUMARATE 200 MG/1
1 TABLET, FILM COATED ORAL
Qty: 0 | Refills: 0 | COMMUNITY
Start: 2018-03-09

## 2018-03-09 RX ORDER — CLOPIDOGREL BISULFATE 75 MG/1
1 TABLET, FILM COATED ORAL
Qty: 0 | Refills: 0 | COMMUNITY
Start: 2018-03-09

## 2018-03-09 RX ORDER — LANOLIN ALCOHOL/MO/W.PET/CERES
1 CREAM (GRAM) TOPICAL
Qty: 30 | Refills: 0 | OUTPATIENT
Start: 2018-03-09 | End: 2018-04-07

## 2018-03-09 RX ORDER — OLANZAPINE 15 MG/1
1 TABLET, FILM COATED ORAL
Qty: 30 | Refills: 0 | OUTPATIENT
Start: 2018-03-09 | End: 2018-04-07

## 2018-03-09 RX ORDER — QUETIAPINE FUMARATE 200 MG/1
1 TABLET, FILM COATED ORAL
Qty: 0 | Refills: 0 | COMMUNITY

## 2018-03-09 RX ADMIN — FAMOTIDINE 20 MILLIGRAM(S): 10 INJECTION INTRAVENOUS at 10:09

## 2018-03-09 RX ADMIN — TIOTROPIUM BROMIDE 1 CAPSULE(S): 18 CAPSULE ORAL; RESPIRATORY (INHALATION) at 15:15

## 2018-03-09 RX ADMIN — ZILEUTON 1200 MILLIGRAM(S): 600 TABLET, MULTILAYER, EXTENDED RELEASE ORAL at 10:09

## 2018-03-09 RX ADMIN — METHADONE HYDROCHLORIDE 10 MILLIGRAM(S): 40 TABLET ORAL at 15:14

## 2018-03-09 RX ADMIN — QUETIAPINE FUMARATE 100 MILLIGRAM(S): 200 TABLET, FILM COATED ORAL at 10:09

## 2018-03-09 RX ADMIN — ENOXAPARIN SODIUM 40 MILLIGRAM(S): 100 INJECTION SUBCUTANEOUS at 10:09

## 2018-03-09 RX ADMIN — CLOPIDOGREL BISULFATE 75 MILLIGRAM(S): 75 TABLET, FILM COATED ORAL at 15:14

## 2018-03-09 RX ADMIN — Medication 10 MILLIGRAM(S): at 10:09

## 2018-03-09 RX ADMIN — Medication 0.5 MILLIGRAM(S): at 10:08

## 2018-03-09 RX ADMIN — METHADONE HYDROCHLORIDE 10 MILLIGRAM(S): 40 TABLET ORAL at 10:08

## 2018-03-09 RX ADMIN — SERTRALINE 100 MILLIGRAM(S): 25 TABLET, FILM COATED ORAL at 15:14

## 2018-03-09 RX ADMIN — BUDESONIDE AND FORMOTEROL FUMARATE DIHYDRATE 2 PUFF(S): 160; 4.5 AEROSOL RESPIRATORY (INHALATION) at 10:09

## 2018-03-09 RX ADMIN — Medication 40 MILLIEQUIVALENT(S): at 10:09

## 2018-03-09 NOTE — DISCHARGE NOTE ADULT - NS AS DC FOLLOWUP STROKE INST
Influenza vaccination (VIS Pub Date: August 7, 2015)/Stroke (includes: TIA/SAH/ICH/Ischemic Stroke)/Smoking Cessation Influenza vaccination (VIS Pub Date: August 7, 2015)/Smoking Cessation

## 2018-03-09 NOTE — DISCHARGE NOTE ADULT - CARE PROVIDER_API CALL
Rodriguez De Leon), Neurology  [address]  [place] [address]  Phone: [phone]  Fax: [phone]    Carlito Francis (), Internal Medicine; Pulmonary Disease  [address]  [address]  [place] [address]  Phone: [phone]  Fax: [phone]

## 2018-03-09 NOTE — DISCHARGE NOTE ADULT - MEDICATION SUMMARY - MEDICATIONS TO STOP TAKING
I will STOP taking the medications listed below when I get home from the hospital:    ALPRAZolam 0.25 mg oral tablet  -- 1 tab(s) by mouth 2 times a day, As needed, anxiety    Valtrex 500 mg oral tablet  -- 1 tab(s) by mouth every 12 hours    gabapentin 100 mg oral tablet  --  by mouth 3 times a day    magnesium oxide 400 mg (241.3 mg elemental magnesium) oral tablet  -- 1 tab(s) by mouth 2 times a day (with meals)

## 2018-03-09 NOTE — PROGRESS NOTE BEHAVIORAL HEALTH - NSBHCONSULTFOLLOWAFTERCARE_PSY_A_CORE FT
Add Zyprexa 2.5mg p.o. at bedtime PRN for management of nocturnal agitation  Patient can follow with geriatric psychiatry service at [facility] [phone].  Alternately, an addition to the regimen (Zyprexa) can be prescribed by patient's current provider.

## 2018-03-09 NOTE — DISCHARGE NOTE ADULT - PLAN OF CARE
back pain controlled Continue pain medications;   Follow up with Neurology if worsening pain despite medications continue current medications  Follow up with Psychiatry continue plavix and statins  Follow up with Neurology close supervision continue current medications continue Zoloft

## 2018-03-09 NOTE — PROGRESS NOTE BEHAVIORAL HEALTH - SUMMARY
68 yo female, , domiciled at home with family, no past psychiatric history, with moderate to severe cognitive dysfunction due to Alzheimer's dementia, CVA, Asthma on chronic steroid, chronic back pain due to bulging disc on methadone, HLD, acid reflux, presents here with worsening back pain. Psychiatry was consulted for multiple episodes of agitation while in the ED. The patient was found in moderate distress, alert but not oriented, attempting to climb out of her bed. The patient's medical history is consistent with a severe cognitive impairment secondary to early onset Alzheimer's dementia. At baseline, the patient is non-conversational and unable to ambulate or care for herself independently. The patient's family describes worsening episodes of agitation and restlessness during the last 3 months for which they require assistance both at home and in the hospital. Given context of patient's severe uncontrolled back pain, worsening aggression possibly due to behavioral response to pain. appropriate response observed to Zyprexa.

## 2018-03-09 NOTE — DISCHARGE NOTE ADULT - ADDITIONAL INSTRUCTIONS
Follow up with Pain specialist, Neurology, psychiatry Follow up with Pain specialist, Neurology, psychiatry  Patient can follow with geriatric psychiatry service at [facility] [phone].

## 2018-03-09 NOTE — PROGRESS NOTE BEHAVIORAL HEALTH - NSBHCONSULTMEDS_PSY_A_CORE FT
Melatonin 3mg po qhs for sleep  Seroquel 100 po tid  Klonopin 0.5mg po qd  Klonopin 1mg po qhs   Sertrailine 100mg po  Consider Zyprexa 2.5mg p.o. at bedtime PRN

## 2018-03-09 NOTE — PROGRESS NOTE ADULT - ASSESSMENT
66yo F with advanced dementia, LBP  1. will defer further neuro enrique at the sons request, please call with questions

## 2018-03-09 NOTE — DISCHARGE NOTE ADULT - CONDITIONS AT DISCHARGE
pt discharge home today, patient and family agreable to plan, discharge instructions given to patient and family - understanding verbalized, IVL removed - site remained asymptomatic, all belongings accounted for and with patient and family, awaiting transport for

## 2018-03-09 NOTE — DISCHARGE NOTE ADULT - PATIENT PORTAL LINK FT
You can access the [facility], offered by [facility], by registering with the following website: [email or web address]

## 2018-03-09 NOTE — DISCHARGE NOTE ADULT - CARE PLAN
Principal Discharge DX:	Low back pain  Goal:	back pain controlled  Assessment and plan of treatment:	Continue pain medications;   Follow up with Neurology if worsening pain despite medications  Secondary Diagnosis:	Delirium  Assessment and plan of treatment:	continue current medications  Follow up with Psychiatry  Secondary Diagnosis:	CVA (cerebral vascular accident)  Assessment and plan of treatment:	continue plavix and statins  Follow up with Neurology  Secondary Diagnosis:	Dementia in Alzheimer's disease  Assessment and plan of treatment:	close supervision  Secondary Diagnosis:	Asthma  Assessment and plan of treatment:	continue current medications  Secondary Diagnosis:	Depression  Assessment and plan of treatment:	continue Zoloft

## 2018-03-09 NOTE — PROGRESS NOTE ADULT - SUBJECTIVE AND OBJECTIVE BOX
pt seen and examined  h/p reviewed  id/ neuro evals noted  mri pending  pain eval   dvt proph
CHIEF COMPLAINT:Patient is a 67y old  Female who presents with a chief complaint of back pain (08 Mar 2018 01:46)    	  Interval history: calm, reports less pain      Allergies:  ASA; dye contrast (Anaphylaxis)  aspirin (Short breath)  penicillin (Short breath; Rash)  sulfa drugs (Short breath; Rash)      PAST MEDICAL & SURGICAL HISTORY:  CVA (cerebral vascular accident)  Fatty pancreas  PNA (pneumonia)  Pulmonary HTN  IGT (impaired glucose tolerance)  Ulcerative colitis  Acid reflux  Anxiety  Depression  Mouth sores  HLD (hyperlipidemia)  Asthma  Humeral head fracture  H/O: hysterectomy  H/O cataract extraction, left  History of knee replacement      FAMILY HISTORY:  Family history of dementia (Father)  Family history of colon cancer (Mother)      REVIEW OF SYSTEMS:  UTO 2/2 dementia, states back pain improving      Medications:  MEDICATIONS  (STANDING):  atorvastatin 20 milliGRAM(s) Oral at bedtime  buDESOnide 160 MICROgram(s)/formoterol 4.5 MICROgram(s) Inhaler 2 Puff(s) Inhalation two times a day  clonazePAM Tablet 1 milliGRAM(s) Oral at bedtime  clonazePAM Tablet 0.5 milliGRAM(s) Oral <User Schedule>  clopidogrel Tablet 75 milliGRAM(s) Oral daily  enoxaparin Injectable 40 milliGRAM(s) SubCutaneous every 24 hours  famotidine    Tablet 20 milliGRAM(s) Oral two times a day  melatonin 3 milliGRAM(s) Oral at bedtime  methadone    Tablet 10 milliGRAM(s) Oral three times a day before meals  predniSONE   Tablet 10 milliGRAM(s) Oral daily  QUEtiapine 100 milliGRAM(s) Oral three times a day  sertraline 100 milliGRAM(s) Oral daily  tiotropium 18 MICROgram(s) Capsule 1 Capsule(s) Inhalation daily  zileuton 1200 milliGRAM(s) Oral two times a day    MEDICATIONS  (PRN):  OLANZapine Injectable 2.5 milliGRAM(s) IntraMuscular every 6 hours PRN severe agitation  ondansetron    Tablet 4 milliGRAM(s) Oral every 8 hours PRN Nausea and/or Vomiting  QUEtiapine 50 milliGRAM(s) Oral every 6 hours PRN agitation    	    PHYSICAL EXAM:  T(C): 36.7 (03-09-18 @ 04:17), Max: 36.7 (03-09-18 @ 04:17)  HR: 88 (03-09-18 @ 04:17) (88 - 114)  BP: 112/69 (03-09-18 @ 04:17) (112/69 - 164/101)  RR: 18 (03-09-18 @ 04:17) (17 - 18)  SpO2: 98% (03-09-18 @ 04:17) (75% - 98%)  Wt(kg): --  I&O's Summary    Appearance: Frail	  HEENT:   NCAT, PERRL, EOMI	  Lymphatic: No lymphadenopathy  Cardiovascular: Normal S1 S2, RRR  Respiratory: Lungs clear to auscultation BL  Psychiatry: A & O x 2, Calm  Gastrointestinal:  Soft, Non-tender, + BS  Skin: No rashes, No ecchymoses, No cyanosis	  Neurologic: Non-focal  Extremities: Normal range of motion, No clubbing, cyanosis or edema    LABS:	 	    CARDIAC MARKERS:                                9.8    14.38 )-----------( 326      ( 08 Mar 2018 07:35 )             31.2     03-08    139  |  100  |  15  ----------------------------<  76  3.5   |  25  |  0.62    Ca    8.5      08 Mar 2018 06:05  Phos  2.4     03-08  Mg     1.5     03-08    TPro  6.5  /  Alb  3.7  /  TBili  0.5  /  DBili  x   /  AST  25  /  ALT  17  /  AlkPhos  81  03-08    proBNP:   Lipid Profile:   HgA1c:   TSH: Thyroid Stimulating Hormone, Serum: 2.31 uIU/mL (03-08 @ 07:54)
PULMONARY PROGRESS NOTE    MYRIAM HEATH  MRN-[id number]    Patient is a 67y old  Female who presents with a chief complaint of back pain (08 Mar 2018 01:46)      HPI:  -Patient sleeping, 1:1 at bedside reports pt was extremely agitated and just fell asleep.  No reports of difficulty breathing/cough etc.    ROS:   -unable to obtain secondary to pt sleeping/dementia    ACTIVE MEDICATION LIST:  MEDICATIONS  (STANDING):  atorvastatin 20 milliGRAM(s) Oral at bedtime  buDESOnide 160 MICROgram(s)/formoterol 4.5 MICROgram(s) Inhaler 2 Puff(s) Inhalation two times a day  clonazePAM Tablet 1 milliGRAM(s) Oral at bedtime  clonazePAM Tablet 1 milliGRAM(s) Oral daily  clopidogrel Tablet 75 milliGRAM(s) Oral daily  enoxaparin Injectable 40 milliGRAM(s) SubCutaneous every 24 hours  famotidine    Tablet 20 milliGRAM(s) Oral two times a day  methadone    Tablet 10 milliGRAM(s) Oral three times a day before meals  predniSONE   Tablet 10 milliGRAM(s) Oral daily  QUEtiapine 100 milliGRAM(s) Oral three times a day  QUEtiapine 100 milliGRAM(s) Oral once  sertraline 100 milliGRAM(s) Oral daily  tiotropium 18 MICROgram(s) Capsule 1 Capsule(s) Inhalation daily  zileuton 1200 milliGRAM(s) Oral two times a day    MEDICATIONS  (PRN):  ondansetron    Tablet 4 milliGRAM(s) Oral every 8 hours PRN Nausea and/or Vomiting      EXAM:  Vital Signs Last 24 Hrs  T(C): 36.6 (08 Mar 2018 09:18), Max: 37.3 (08 Mar 2018 00:46)  T(F): 97.8 (08 Mar 2018 09:18), Max: 99.2 (08 Mar 2018 00:46)  HR: 114 (08 Mar 2018 09:18) (95 - 114)  BP: 144/95 (08 Mar 2018 12:38) (144/95 - 164/101)  BP(mean): --  RR: 18 (08 Mar 2018 09:18) (18 - 18)  SpO2: 98% (08 Mar 2018 09:18) (98% - 98%)    GENERAL: sleeping, appears comfortable.    LUNGS: no wheeze        LABS/IMGAING: reviewed                        9.8    14.38 )-----------( 326      ( 08 Mar 2018 07:35 )             31.2   03-08    139  |  100  |  15  ----------------------------<  76  3.5   |  25  |  0.62    Ca    8.5      08 Mar 2018 06:05  Phos  2.4     03-08  Mg     1.5     03-08    TPro  6.5  /  Alb  3.7  /  TBili  0.5  /  DBili  x   /  AST  25  /  ALT  17  /  AlkPhos  81  03-08  < from: Xray Chest 1 View- PORTABLE-Routine (03.07.18 @ 20:57) >    IMPRESSION: Clear lungs.      < end of copied text >      PROBLEM LIST:  67y Female with HEALTH ISSUES - PROBLEM Dx:  CVA (cerebral vascular accident): CVA (cerebral vascular accident)  Hyperlipidemia: Hyperlipidemia  Asthma: Asthma  Depression: Depression  Agitation: Agitation  Low back pain: Low back pain    RECS:  -Asthma: not in exacerbation, continue symbicort, spiriva PRN nebs  -appreciate neuro input  -needs psych eval      Luciana Mancini MD  [phone]
Patient is a 67y old  Female who presents with a chief complaint of back pain (08 Mar 2018 01:46)      HPI:      Pt seen and examined, no events noted, no new complaints at  this time. son does not want any further neuro testing  Vital Signs Last 24 Hrs  T(C): 36.7 (09 Mar 2018 04:17), Max: 36.7 (09 Mar 2018 04:17)  T(F): 98 (09 Mar 2018 04:17), Max: 98 (09 Mar 2018 04:17)  HR: 88 (09 Mar 2018 04:17) (88 - 114)  BP: 112/69 (09 Mar 2018 04:17) (112/69 - 164/101)  BP(mean): --  RR: 18 (09 Mar 2018 04:17) (17 - 18)  SpO2: 98% (09 Mar 2018 04:17) (75% - 98%)    Physical Exam    Mental Status-lethargic,arousable  CN- 2-12 grossly intact  Motor- ALLAN  Sensory- intact Lt  Coordination- unreliable  Gait- deferred
infectious diseases progress note:    Patient is a 67y old  Female who presents with a chief complaint of back pain (08 Mar 2018 01:46)        Low back pain        R   ASA; dye contrast (Anaphylaxis)  aspirin (Short breath)  penicillin (Short breath; Rash)  sulfa drugs (Short breath; Rash)    Intolerances        ANTIBIOTICS/RELEVANT:  antimicrobials    immunologic:    OTHER:  atorvastatin 20 milliGRAM(s) Oral at bedtime  buDESOnide 160 MICROgram(s)/formoterol 4.5 MICROgram(s) Inhaler 2 Puff(s) Inhalation two times a day  clonazePAM Tablet 1 milliGRAM(s) Oral at bedtime  clonazePAM Tablet 0.5 milliGRAM(s) Oral <User Schedule>  clopidogrel Tablet 75 milliGRAM(s) Oral daily  enoxaparin Injectable 40 milliGRAM(s) SubCutaneous every 24 hours  famotidine    Tablet 20 milliGRAM(s) Oral two times a day  melatonin 3 milliGRAM(s) Oral at bedtime  methadone    Tablet 10 milliGRAM(s) Oral three times a day before meals  OLANZapine Injectable 2.5 milliGRAM(s) IntraMuscular every 6 hours PRN  ondansetron    Tablet 4 milliGRAM(s) Oral every 8 hours PRN  predniSONE   Tablet 10 milliGRAM(s) Oral daily  QUEtiapine 50 milliGRAM(s) Oral every 6 hours PRN  QUEtiapine 100 milliGRAM(s) Oral three times a day  sertraline 100 milliGRAM(s) Oral daily  tiotropium 18 MICROgram(s) Capsule 1 Capsule(s) Inhalation daily  zileuton 1200 milliGRAM(s) Oral two times a day      Objective:  Vital Signs Last 24 Hrs  T(C): 36.7 (09 Mar 2018 04:17), Max: 36.7 (09 Mar 2018 04:17)  T(F): 98 (09 Mar 2018 04:17), Max: 98 (09 Mar 2018 04:17)  HR: 88 (09 Mar 2018 04:17) (88 - 114)  BP: 112/69 (09 Mar 2018 04:17) (112/69 - 164/101)  BP(mean): --  RR: 18 (09 Mar 2018 04:17) (17 - 18)  SpO2: 98% (09 Mar 2018 04:17) (75% - 98%)    PHYSICAL EXAM:     Eyes:JACQUELIN, EOMI  Ear/Nose/Throat: no oral lesion, no sinus tenderness on percussion	  Neck:no JVD, no lymphadenopathy, supple  Respiratory: CTA maryjane  Cardiovascular: S1S2 RRR, no murmurs  Gastrointestinal:soft, (+) BS, no HSM  Extremities:no e/e/c        LABS:                        9.8    14.38 )-----------( 326      ( 08 Mar 2018 07:35 )             31.2     03-09    141  |  101  |  17  ----------------------------<  73  3.3<L>   |  27  |  0.76    Ca    8.7      09 Mar 2018 06:36  Phos  2.4     03-08  Mg     1.5     03-08    TPro  6.5  /  Alb  3.7  /  TBili  0.5  /  DBili  x   /  AST  25  /  ALT  17  /  AlkPhos  81  03-08    PT/INR - ( 07 Mar 2018 20:45 )   PT: 10.7 sec;   INR: 0.98 ratio         PTT - ( 07 Mar 2018 20:45 )  PTT:29.5 sec  Urinalysis Basic - ( 07 Mar 2018 20:44 )    Color: Yellow / Appearance: Clear / S.027 / pH: x  Gluc: x / Ketone: Negative  / Bili: Negative / Urobili: 1 mg/dL   Blood: x / Protein: Trace / Nitrite: Negative   Leuk Esterase: Negative / RBC: x / WBC x   Sq Epi: x / Non Sq Epi: x / Bacteria: x          MICROBIOLOGY:    RECENT CULTURES:  - @ 00:42 .Urine Catheterized                No growth          RESPIRATORY CULTURES:              RADIOLOGY & ADDITIONAL STUDIES:        Pager [phone]  After 5 pm/weekends or if no response :[phone]

## 2018-03-09 NOTE — DISCHARGE NOTE ADULT - HOSPITAL COURSE
66 yo female with moderate to severe cognitive dysfunction due to Alzheimer's dementia, CVA, Asthma on chronic steroid, chronic anxiety disorder, chronic back pain due to bulging disc on methadone, HLD, acid reflux, presents here with worsening back pain. 68 yo female with moderate to severe cognitive dysfunction due to Alzheimer's dementia, CVA, Asthma on chronic steroid, chronic anxiety disorder, chronic back pain due to bulging disc on methadone, HLD, acid reflux, presents here with worsening back pain. Continue care.  Methadone 10mg po tid for chronic pain (baseline medication). She follows up with Dr. De Leon and Tana Kilgore for outpatient pain medications.   - Consider Tylenol 1gram IV q8hr prn pain if no contraindications as per primary team.   istop verified, on chart ref # [id number]. 66 yo female with moderate to severe cognitive dysfunction due to Alzheimer's dementia, CVA, Asthma on chronic steroid, chronic anxiety disorder, chronic back pain due to bulging disc on methadone, HLD, acid reflux, presents here with worsening back pain. Moving all extremities spontaneously, 5/5 strength (though limited exam).  No reported urinary or bowel incontinence.  No suspicion for cord compression at this time. Seen by Neurology; Recommended MRI L spine which  refused since he felt   Methadone 10mg po tid for chronic pain (baseline medication). She follows up with Dr. De Leon and Tana Kilgore for outpatient pain medications.   - Consider Tylenol 1gram IV q8hr prn pain if no contraindications as per primary team.   istop verified, on chart ref # [id number]. 66 yo female with moderate to severe cognitive dysfunction due to Alzheimer's dementia, CVA, Asthma on chronic steroid, chronic anxiety disorder, chronic back pain due to bulging disc on methadone, HLD, acid reflux, presents here with worsening back pain. Moving all extremities spontaneously, 5/5 strength (though limited exam).  No reported urinary or bowel incontinence.  No suspicion for cord compression at this time. Seen by Neurology; Recommended MRI L spine which  refused since he felt as pt's functional status limited and findings will be of limited use. PAin team consulted; Recommneds to continue Methadone 10mg po tid for chronic pain (baseline medication) and follow up with Dr. De Leon and Tana Kilgore for outpatient pain medications.   istop verified, on chart ref # [id number]. Seen by Psychiatry for delirium/agitation; Medications adjusted ; Agitation improved; pain improved; Discharged home. 66 yo female with moderate to severe cognitive dysfunction due to Alzheimer's dementia, CVA, Asthma on chronic steroid, chronic anxiety disorder, chronic back pain due to bulging disc on methadone, HLD, acid reflux, presents here with worsening back pain. Moving all extremities spontaneously, 5/5 strength (though limited exam).  No reported urinary or bowel incontinence.  No suspicion for cord compression at this time. Seen by Neurology; Recommended MRI L spine which  refused since he felt as pt's functional status limited and findings will be of limited use. PAin team consulted; Recommneds to continue Methadone 10mg po tid for chronic pain (baseline medication) and follow up with Dr. De Leon and Tana Kilgore for outpatient pain medications.   istop verified, on chart ref # [id number]. Seen by Psychiatry for delirium/agitation; Medications adjusted ; Agitation improved; pain improved; Discharged home with home care

## 2018-03-09 NOTE — PROGRESS NOTE BEHAVIORAL HEALTH - NSBHCHARTREVIEWVS_PSY_A_CORE FT
Vital Signs Last 24 Hrs  T(C): 37.1 (09 Mar 2018 12:24), Max: 37.1 (09 Mar 2018 12:24)  T(F): 98.8 (09 Mar 2018 12:24), Max: 98.8 (09 Mar 2018 12:24)  HR: 88 (09 Mar 2018 12:24) (88 - 89)  BP: 114/68 (09 Mar 2018 12:24) (112/69 - 118/77)  BP(mean): --  RR: 19 (09 Mar 2018 12:24) (17 - 19)  SpO2: 98% (09 Mar 2018 12:24) (94% - 98%)

## 2018-03-09 NOTE — PROGRESS NOTE BEHAVIORAL HEALTH - NSBHFUPINTERVALHXFT_PSY_A_CORE
Patient seen and evaluated covering for Dr. Last/Gamaliel, staff consulted, chart, labs, meds reviewed. There no issues reported overnight. Patient awake at times, moving about and speaking a few words. Overall communication remains limited. Patient's  states there are mild improvements in behavior and sleep.    Direction of care discussed with . Counseling and support provided.  Patient did not represent a management problem overnight.

## 2018-03-09 NOTE — PROGRESS NOTE ADULT - ASSESSMENT
68 yo female with moderate to severe cognitive dysfunction due to Alzheimer's dementia, CVA, Asthma on chronic steroid, chronic anxiety disorder, chronic back pain due to bulging disc on methadone, HLD, acid reflux, presents here with worsening back pain:  1. Back pain - pt reports improvement, will f/u with her pain specialist outpt,  refusing MRI as pt's functional status limited and findings will be of limited use  2. Agitation - much improved, regimen per Psych  3. Leukocytosis - stable off abx, no localizing symptoms, possibly 2/2 steroids, outpt w/u  4. Asthma - c/w inhalers per Pulm  5. HLD - c/w statin  6. Hx of CVA - c/w Plavix, statin  7. DVT prophylaxis  8. D/c home with  if cleared by Psych

## 2018-03-09 NOTE — DISCHARGE NOTE ADULT - MEDICATION SUMMARY - MEDICATIONS TO TAKE
I will START or STAY ON the medications listed below when I get home from the hospital:    predniSONE 10 mg oral tablet  -- 1 tab(s) by mouth once a day  -- Indication: For Asthma    methadone 10 mg oral tablet  -- 1 tab(s) by mouth 3 times a day (before meals)  -- Indication: For Chronic low back pain     KlonoPIN 0.5 mg oral tablet  -- 1 tab(s) by mouth once a day in the morning  -- Indication: For Anxiety    KlonoPIN 0.5 mg oral tablet  -- 2 tab(s) by mouth once a day at night  -- Indication: For Anxiety    sertraline 100 mg oral tablet  -- 1 tab(s) by mouth once a day  -- Indication: For Depression    ondansetron 4 mg oral tablet  -- 1 tab(s) by mouth every 8 hours, As Needed  -- Indication: For nausea    Zetia 10 mg oral tablet  -- 1 tab(s) by mouth once a day  -- Indication: For High cholesterol    atorvastatin 20 mg oral tablet  -- 1 tab(s) by mouth once a day (at bedtime)  -- Indication: For High cholesterol    clopidogrel 75 mg oral tablet  -- 1 tab(s) by mouth once a day  -- Indication: For CVA (cerebral vascular accident)    QUEtiapine 100 mg oral tablet  -- 1 tab(s) by mouth 3 times a day  -- Indication: For Dementia in Alzheimer's disease    OLANZapine 2.5 mg oral tablet  -- 1 tab(s) by mouth once a day (at bedtime)   -- It is very important that you take or use this exactly as directed.  Do not skip doses or discontinue unless directed by your doctor.  May cause drowsiness.  Alcohol may intensify this effect.  Use care when operating dangerous machinery.  Obtain medical advice before taking any non-prescription drugs as some may affect the action of this medication.    -- Indication: For Delirium    Spiriva 18 mcg inhalation capsule  -- 1 cap(s) inhaled once a day  -- Indication: For Asthma    Advair Diskus 500 mcg-50 mcg inhalation powder  -- 2 puff(s) inhaled 2 times a day  -- Indication: For Asthma    calcitonin 200 intl units/inh nasal spray  -- 1 spray(s) into nose once a day  -- Indication: For osteoporosis    Amitiza 24 mcg oral capsule  -- 1 cap(s) by mouth 2 times a day  -- Indication: For Constipation    Pepcid 20 mg oral tablet  -- 1 tab(s) by mouth 2 times a day  -- Indication: For gerd    zileuton 600 mg oral tablet  -- 2 tab(s) by mouth 2 times a day  -- Indication: For Asthma    Chelated Magnesium 100 mg oral tablet  -- 1 tab(s) by mouth once a day  -- Indication: For Low mag    melatonin 3 mg oral tablet  -- 1 tab(s) by mouth once a day (at bedtime)  -- Indication: For insmonia

## 2018-03-16 ENCOUNTER — INPATIENT (INPATIENT)
Facility: HOSPITAL | Age: 68
LOS: 6 days | Discharge: HOME | DRG: 871 | End: 2018-03-23
Attending: INTERNAL MEDICINE | Admitting: INTERNAL MEDICINE
Payer: MEDICARE

## 2018-03-16 VITALS
HEART RATE: 104 BPM | SYSTOLIC BLOOD PRESSURE: 142 MMHG | RESPIRATION RATE: 20 BRPM | DIASTOLIC BLOOD PRESSURE: 90 MMHG | OXYGEN SATURATION: 96 %

## 2018-03-16 DIAGNOSIS — S42.293A OTHER DISPLACED FRACTURE OF UPPER END OF UNSPECIFIED HUMERUS, INITIAL ENCOUNTER FOR CLOSED FRACTURE: Chronic | ICD-10-CM

## 2018-03-16 DIAGNOSIS — J18.9 PNEUMONIA, UNSPECIFIED ORGANISM: ICD-10-CM

## 2018-03-16 DIAGNOSIS — Z98.42 CATARACT EXTRACTION STATUS, LEFT EYE: Chronic | ICD-10-CM

## 2018-03-16 DIAGNOSIS — Z90.710 ACQUIRED ABSENCE OF BOTH CERVIX AND UTERUS: Chronic | ICD-10-CM

## 2018-03-16 DIAGNOSIS — Z96.659 PRESENCE OF UNSPECIFIED ARTIFICIAL KNEE JOINT: Chronic | ICD-10-CM

## 2018-03-16 LAB
APPEARANCE UR: CLEAR — SIGNIFICANT CHANGE UP
APTT BLD: 33 SEC — SIGNIFICANT CHANGE UP (ref 27.5–37.4)
BASE EXCESS BLDV CALC-SCNC: 1.5 MMOL/L — SIGNIFICANT CHANGE UP (ref -2–2)
BASOPHILS # BLD AUTO: 0 K/UL — SIGNIFICANT CHANGE UP (ref 0–0.2)
BASOPHILS NFR BLD AUTO: 0 % — SIGNIFICANT CHANGE UP (ref 0–2)
BILIRUB UR-MCNC: NEGATIVE — SIGNIFICANT CHANGE UP
CA-I SERPL-SCNC: 1.13 MMOL/L — SIGNIFICANT CHANGE UP (ref 1.12–1.3)
CHLORIDE BLDV-SCNC: 102 MMOL/L — SIGNIFICANT CHANGE UP (ref 96–108)
CK MB BLD-MCNC: 3 % — SIGNIFICANT CHANGE UP (ref 0–3.5)
CK MB CFR SERPL CALC: 3.2 NG/ML — SIGNIFICANT CHANGE UP (ref 0–3.8)
CK SERPL-CCNC: 105 U/L — SIGNIFICANT CHANGE UP (ref 25–170)
CO2 BLDV-SCNC: 27 MMOL/L — SIGNIFICANT CHANGE UP (ref 22–30)
COLOR SPEC: YELLOW — SIGNIFICANT CHANGE UP
DIFF PNL FLD: NEGATIVE — SIGNIFICANT CHANGE UP
EOSINOPHIL # BLD AUTO: 0 K/UL — SIGNIFICANT CHANGE UP (ref 0–0.5)
EOSINOPHIL NFR BLD AUTO: 0 % — SIGNIFICANT CHANGE UP (ref 0–6)
GAS PNL BLDV: 134 MMOL/L — LOW (ref 136–145)
GAS PNL BLDV: SIGNIFICANT CHANGE UP
GAS PNL BLDV: SIGNIFICANT CHANGE UP
GLUCOSE BLDV-MCNC: 92 MG/DL — SIGNIFICANT CHANGE UP (ref 70–99)
GLUCOSE UR QL: ABNORMAL
HCO3 BLDV-SCNC: 26 MMOL/L — SIGNIFICANT CHANGE UP (ref 21–29)
HCT VFR BLD CALC: 30.2 % — LOW (ref 34.5–45)
HCT VFR BLDA CALC: 29 % — LOW (ref 39–50)
HGB BLD CALC-MCNC: 9.4 G/DL — LOW (ref 11.5–15.5)
HGB BLD-MCNC: 9.8 G/DL — LOW (ref 11.5–15.5)
INR BLD: 1.12 RATIO — SIGNIFICANT CHANGE UP (ref 0.88–1.16)
KETONES UR-MCNC: NEGATIVE — SIGNIFICANT CHANGE UP
LACTATE BLDV-MCNC: 2 MMOL/L — SIGNIFICANT CHANGE UP (ref 0.7–2)
LEUKOCYTE ESTERASE UR-ACNC: NEGATIVE — SIGNIFICANT CHANGE UP
LYMPHOCYTES # BLD AUTO: 1.1 K/UL — SIGNIFICANT CHANGE UP (ref 1–3.3)
LYMPHOCYTES # BLD AUTO: 11 % — LOW (ref 13–44)
MCHC RBC-ENTMCNC: 27.6 PG — SIGNIFICANT CHANGE UP (ref 27–34)
MCHC RBC-ENTMCNC: 32.6 GM/DL — SIGNIFICANT CHANGE UP (ref 32–36)
MCV RBC AUTO: 84.6 FL — SIGNIFICANT CHANGE UP (ref 80–100)
MONOCYTES # BLD AUTO: 0.5 K/UL — SIGNIFICANT CHANGE UP (ref 0–0.9)
MONOCYTES NFR BLD AUTO: 4 % — SIGNIFICANT CHANGE UP (ref 2–14)
NEUTROPHILS # BLD AUTO: 13.1 K/UL — HIGH (ref 1.8–7.4)
NEUTROPHILS NFR BLD AUTO: 85 % — HIGH (ref 43–77)
NITRITE UR-MCNC: NEGATIVE — SIGNIFICANT CHANGE UP
PCO2 BLDV: 44 MMHG — SIGNIFICANT CHANGE UP (ref 35–50)
PH BLDV: 7.39 — SIGNIFICANT CHANGE UP (ref 7.35–7.45)
PH UR: 6 — SIGNIFICANT CHANGE UP (ref 5–8)
PLATELET # BLD AUTO: 316 K/UL — SIGNIFICANT CHANGE UP (ref 150–400)
PO2 BLDV: 16 MMHG — LOW (ref 25–45)
POTASSIUM BLDV-SCNC: 3.8 MMOL/L — SIGNIFICANT CHANGE UP (ref 3.5–5)
PROT UR-MCNC: SIGNIFICANT CHANGE UP
PROTHROM AB SERPL-ACNC: 12.2 SEC — SIGNIFICANT CHANGE UP (ref 9.8–12.7)
RAPID RVP RESULT: SIGNIFICANT CHANGE UP
RBC # BLD: 3.57 M/UL — LOW (ref 3.8–5.2)
RBC # FLD: 15.3 % — HIGH (ref 10.3–14.5)
RBC CASTS # UR COMP ASSIST: SIGNIFICANT CHANGE UP /HPF (ref 0–2)
SAO2 % BLDV: 13 % — LOW (ref 67–88)
SP GR SPEC: 1.02 — SIGNIFICANT CHANGE UP (ref 1.01–1.02)
TROPONIN T SERPL-MCNC: 0.05 NG/ML — SIGNIFICANT CHANGE UP (ref 0–0.06)
UROBILINOGEN FLD QL: NEGATIVE — SIGNIFICANT CHANGE UP
WBC # BLD: 14.7 K/UL — HIGH (ref 3.8–10.5)
WBC # FLD AUTO: 14.7 K/UL — HIGH (ref 3.8–10.5)
WBC UR QL: SIGNIFICANT CHANGE UP /HPF (ref 0–5)

## 2018-03-16 PROCEDURE — 99285 EMERGENCY DEPT VISIT HI MDM: CPT | Mod: GC

## 2018-03-16 PROCEDURE — 71045 X-RAY EXAM CHEST 1 VIEW: CPT | Mod: 26

## 2018-03-16 RX ORDER — CLONAZEPAM 1 MG
0.5 TABLET ORAL ONCE
Qty: 0 | Refills: 0 | Status: DISCONTINUED | OUTPATIENT
Start: 2018-03-16 | End: 2018-03-16

## 2018-03-16 RX ORDER — ACETAMINOPHEN 500 MG
1000 TABLET ORAL ONCE
Qty: 0 | Refills: 0 | Status: DISCONTINUED | OUTPATIENT
Start: 2018-03-16 | End: 2018-03-16

## 2018-03-16 RX ORDER — CEFEPIME 1 G/1
2000 INJECTION, POWDER, FOR SOLUTION INTRAMUSCULAR; INTRAVENOUS ONCE
Qty: 0 | Refills: 0 | Status: DISCONTINUED | OUTPATIENT
Start: 2018-03-16 | End: 2018-03-16

## 2018-03-16 RX ORDER — QUETIAPINE FUMARATE 200 MG/1
100 TABLET, FILM COATED ORAL ONCE
Qty: 0 | Refills: 0 | Status: COMPLETED | OUTPATIENT
Start: 2018-03-16 | End: 2018-03-16

## 2018-03-16 RX ORDER — QUETIAPINE FUMARATE 200 MG/1
200 TABLET, FILM COATED ORAL ONCE
Qty: 0 | Refills: 0 | Status: DISCONTINUED | OUTPATIENT
Start: 2018-03-16 | End: 2018-03-16

## 2018-03-16 RX ORDER — ACETAMINOPHEN 500 MG
975 TABLET ORAL ONCE
Qty: 0 | Refills: 0 | Status: COMPLETED | OUTPATIENT
Start: 2018-03-16 | End: 2018-03-16

## 2018-03-16 RX ORDER — SODIUM CHLORIDE 9 MG/ML
1000 INJECTION INTRAMUSCULAR; INTRAVENOUS; SUBCUTANEOUS ONCE
Qty: 0 | Refills: 0 | Status: COMPLETED | OUTPATIENT
Start: 2018-03-16 | End: 2018-03-16

## 2018-03-16 RX ORDER — VANCOMYCIN HCL 1 G
1000 VIAL (EA) INTRAVENOUS ONCE
Qty: 0 | Refills: 0 | Status: COMPLETED | OUTPATIENT
Start: 2018-03-16 | End: 2018-03-16

## 2018-03-16 RX ADMIN — SODIUM CHLORIDE 1000 MILLILITER(S): 9 INJECTION INTRAMUSCULAR; INTRAVENOUS; SUBCUTANEOUS at 18:05

## 2018-03-16 RX ADMIN — Medication 0.5 MILLIGRAM(S): at 17:47

## 2018-03-16 RX ADMIN — Medication 975 MILLIGRAM(S): at 18:06

## 2018-03-16 RX ADMIN — QUETIAPINE FUMARATE 100 MILLIGRAM(S): 200 TABLET, FILM COATED ORAL at 20:33

## 2018-03-16 RX ADMIN — Medication 250 MILLIGRAM(S): at 18:05

## 2018-03-16 RX ADMIN — Medication 0.5 MILLIGRAM(S): at 19:42

## 2018-03-16 NOTE — ED PROVIDER NOTE - UNABLE TO OBTAIN
Severe Illness/Injury Patient has severe Alzheimer's; largely non-verbal - hx by family... productive cough, AMS

## 2018-03-16 NOTE — ED PROVIDER NOTE - OBJECTIVE STATEMENT
67 year-old female with history of moderate to severe cognitive dysfunction due to Alzheimer's dementia, CVA, Asthma on chronic steroid, chronic anxiety disorder, chronic back pain due to bulging disc on methadone, HLD, acid reflux presents to the Emergency Department for fever.  Family has HHA and nursing   rapid change in mental status; wheezig/rhonchi/pain/weak unky/cough; dec PO intake  Dr. Francis: [phone] 67 year-old female with history of moderate to severe cognitive dysfunction due to Alzheimer's dementia, CVA, Asthma on chronic steroid, chronic anxiety disorder, chronic back pain due to bulging disc on methadone, HLD, acid reflux presents to the Emergency Department for fever.  Family has HHA and nursing home care.  Patient had a rapid change in mental status today where she has been in bed; not moving to bathroom (w/ assistance) and such as baseline.  Noted to have a productive cough with dec PO intake.  Wheezing and rhonchi heard by outpatient nursing.  Temp 99.6 on axilla.   Trust: [phone]

## 2018-03-16 NOTE — ED PROVIDER NOTE - PHYSICAL EXAMINATION
*Gen: AAO*0, frail/cachetic lying postured (fleed) in bed  *HEENT: NC/AT, dry mucous membranes, airway patent, trachea midline  *CV: RRR, S1/S2 present, no murmurs/rubs/gallops  *Resp: crackles throughout; worse in the right lung  *Abd: non-distended, soft N/Tx4, no guarding or rigidity  *Neuro: lying in bed postured; not following directions; unable to fully assess  *Extremities: no gross deformity  *Skin: no rashes  ~ José Miguel Jimenez M.D.

## 2018-03-16 NOTE — ED PROVIDER NOTE - PMH
Acid reflux    Anxiety    Asthma    CVA (cerebral vascular accident)    Depression    Fatty pancreas    HLD (hyperlipidemia)    IGT (impaired glucose tolerance)    Mouth sores    PNA (pneumonia)    Pulmonary HTN    Ulcerative colitis

## 2018-03-16 NOTE — ED PROVIDER NOTE - MEDICAL DECISION MAKING DETAILS
67 year-old female with history of moderate to severe cognitive dysfunction due to Alzheimer's dementia, p/w fever, productive cough with change in mental status likely due to sepsis from PNA.  Plan to give HCAP medications due to recent admission to the hospital with cultures and basic blood work; provide Tylenol for fever.  Plan to admit to medicine/bounceback.  Check for other sources of infection, Head CT for further evaluation. 67 year-old female with history of moderate to severe cognitive dysfunction due to Alzheimer's dementia, p/w fever, productive cough with change in mental status likely due to sepsis from PNA.  Plan to give HCAP medications due to recent admission to the hospital with cultures and basic blood work; provide Tylenol for fever.  Plan to admit to medicine/bounceback.  Check for other sources of infection, Head CT for further evaluation.  ATTG: return to hospital with cough, concern for HCAP. will check labs, check urine for possible uti as cause, check xray chest and consider abx as patient is febrile.  likely admit to hospital given co morbidities and poor health status.

## 2018-03-16 NOTE — ED ADULT NURSE NOTE - OBJECTIVE STATEMENT
66 y/o female pt with history of moderate to severe cognitive dysfunction due to Alzheimer's dementia, CVA, Asthma on chronic steroid, chronic anxiety disorder, chronic back pain due to bulging disc on methadone, HLD, acid reflux, presents to the ED for fever, change in mental status, cough,  Family has HHA and nursing home care.  Patient had a rapid change in mental status today where she has been in bed; not moving to bathroom (w/ assistance) and such as baseline.  Noted to have a productive cough with decreased PO intake.  Wheezing and rhonchi heard by outpatient nursing.  Temp 99.6 on axilla. Here in ED on exam pt alert and oriented times 1, breathing, spontaneous, unlabored without distress on room air, little agitated, lungs b/l rhonchi, pt febrile 101.2 rectally, seen and eval by MD, labs drawn and sent, pt straight cath for urine

## 2018-03-16 NOTE — ED PROVIDER NOTE - ATTENDING CONTRIBUTION TO CARE
68 y/o f with pmhx dementia, CVA, asthma, anxiety, presents for concern of fever and change in ms. per family also having a cough. was just in hospital and admitted last week.  Family describes a  productive cough with dec PO intake.  Wheezing and rhonchi heard by outpatient nursing.  Temp 99.6 on axilla.  and children at bedside.   Dr. Francis: [phone]  Gen.  no resp dist but occasionally coughing. cachetic elderly woman appears older than stated age.  HEENT:  dry mm  Lungs:  b/l rhonchi at bases. and crackles bases  CVS: S1S2   Abd;  soft non tend  Ext: no edema  Neuro: awake, alert, oriented to place and self.soft spoken but clear speech.   MSK: 5/5 x 4 ms

## 2018-03-17 DIAGNOSIS — E78.5 HYPERLIPIDEMIA, UNSPECIFIED: ICD-10-CM

## 2018-03-17 DIAGNOSIS — J45.909 UNSPECIFIED ASTHMA, UNCOMPLICATED: ICD-10-CM

## 2018-03-17 DIAGNOSIS — J18.9 PNEUMONIA, UNSPECIFIED ORGANISM: ICD-10-CM

## 2018-03-17 DIAGNOSIS — I63.9 CEREBRAL INFARCTION, UNSPECIFIED: ICD-10-CM

## 2018-03-17 DIAGNOSIS — K21.9 GASTRO-ESOPHAGEAL REFLUX DISEASE WITHOUT ESOPHAGITIS: ICD-10-CM

## 2018-03-17 DIAGNOSIS — M54.5 LOW BACK PAIN: ICD-10-CM

## 2018-03-17 DIAGNOSIS — G30.0 ALZHEIMER'S DISEASE WITH EARLY ONSET: ICD-10-CM

## 2018-03-17 LAB
ANION GAP SERPL CALC-SCNC: 16 MMOL/L — SIGNIFICANT CHANGE UP (ref 5–17)
BASOPHILS # BLD AUTO: 0 K/UL — SIGNIFICANT CHANGE UP (ref 0–0.2)
BASOPHILS NFR BLD AUTO: 0 % — SIGNIFICANT CHANGE UP (ref 0–2)
BUN SERPL-MCNC: 20 MG/DL — SIGNIFICANT CHANGE UP (ref 7–23)
CALCIUM SERPL-MCNC: 8.2 MG/DL — LOW (ref 8.4–10.5)
CHLORIDE SERPL-SCNC: 100 MMOL/L — SIGNIFICANT CHANGE UP (ref 96–108)
CO2 SERPL-SCNC: 20 MMOL/L — LOW (ref 22–31)
CREAT SERPL-MCNC: 0.57 MG/DL — SIGNIFICANT CHANGE UP (ref 0.5–1.3)
CULTURE RESULTS: SIGNIFICANT CHANGE UP
EOSINOPHIL # BLD AUTO: 0.07 K/UL — SIGNIFICANT CHANGE UP (ref 0–0.5)
EOSINOPHIL NFR BLD AUTO: 0.5 % — SIGNIFICANT CHANGE UP (ref 0–6)
GLUCOSE SERPL-MCNC: 53 MG/DL — LOW (ref 70–99)
HCT VFR BLD CALC: 29.1 % — LOW (ref 34.5–45)
HGB BLD-MCNC: 9 G/DL — LOW (ref 11.5–15.5)
IMM GRANULOCYTES NFR BLD AUTO: 0.3 % — SIGNIFICANT CHANGE UP (ref 0–1.5)
LYMPHOCYTES # BLD AUTO: 0.84 K/UL — LOW (ref 1–3.3)
LYMPHOCYTES # BLD AUTO: 6.5 % — LOW (ref 13–44)
MAGNESIUM SERPL-MCNC: 1.2 MG/DL — LOW (ref 1.6–2.6)
MAGNESIUM SERPL-MCNC: 1.7 MG/DL — SIGNIFICANT CHANGE UP (ref 1.6–2.6)
MCHC RBC-ENTMCNC: 26.7 PG — LOW (ref 27–34)
MCHC RBC-ENTMCNC: 30.9 GM/DL — LOW (ref 32–36)
MCV RBC AUTO: 86.4 FL — SIGNIFICANT CHANGE UP (ref 80–100)
MONOCYTES # BLD AUTO: 0.53 K/UL — SIGNIFICANT CHANGE UP (ref 0–0.9)
MONOCYTES NFR BLD AUTO: 4.1 % — SIGNIFICANT CHANGE UP (ref 2–14)
NEUTROPHILS # BLD AUTO: 11.53 K/UL — HIGH (ref 1.8–7.4)
NEUTROPHILS NFR BLD AUTO: 88.6 % — HIGH (ref 43–77)
PHOSPHATE SERPL-MCNC: 3.7 MG/DL — SIGNIFICANT CHANGE UP (ref 2.5–4.5)
PLATELET # BLD AUTO: 291 K/UL — SIGNIFICANT CHANGE UP (ref 150–400)
POTASSIUM SERPL-MCNC: 3.4 MMOL/L — LOW (ref 3.5–5.3)
POTASSIUM SERPL-SCNC: 3.4 MMOL/L — LOW (ref 3.5–5.3)
RBC # BLD: 3.37 M/UL — LOW (ref 3.8–5.2)
RBC # FLD: 16 % — HIGH (ref 10.3–14.5)
SODIUM SERPL-SCNC: 136 MMOL/L — SIGNIFICANT CHANGE UP (ref 135–145)
SPECIMEN SOURCE: SIGNIFICANT CHANGE UP
WBC # BLD: 13.01 K/UL — HIGH (ref 3.8–10.5)
WBC # FLD AUTO: 13.01 K/UL — HIGH (ref 3.8–10.5)

## 2018-03-17 PROCEDURE — 99223 1ST HOSP IP/OBS HIGH 75: CPT | Mod: AI

## 2018-03-17 PROCEDURE — 99222 1ST HOSP IP/OBS MODERATE 55: CPT | Mod: GC

## 2018-03-17 PROCEDURE — 93010 ELECTROCARDIOGRAM REPORT: CPT

## 2018-03-17 RX ORDER — CLONAZEPAM 1 MG
1 TABLET ORAL AT BEDTIME
Qty: 0 | Refills: 0 | Status: DISCONTINUED | OUTPATIENT
Start: 2018-03-17 | End: 2018-03-17

## 2018-03-17 RX ORDER — QUETIAPINE FUMARATE 200 MG/1
100 TABLET, FILM COATED ORAL
Qty: 0 | Refills: 0 | Status: DISCONTINUED | OUTPATIENT
Start: 2018-03-17 | End: 2018-03-23

## 2018-03-17 RX ORDER — VANCOMYCIN HCL 1 G
1000 VIAL (EA) INTRAVENOUS EVERY 12 HOURS
Qty: 0 | Refills: 0 | Status: DISCONTINUED | OUTPATIENT
Start: 2018-03-17 | End: 2018-03-17

## 2018-03-17 RX ORDER — QUETIAPINE FUMARATE 200 MG/1
100 TABLET, FILM COATED ORAL THREE TIMES A DAY
Qty: 0 | Refills: 0 | Status: DISCONTINUED | OUTPATIENT
Start: 2018-03-17 | End: 2018-03-17

## 2018-03-17 RX ORDER — METHADONE HYDROCHLORIDE 40 MG/1
10 TABLET ORAL
Qty: 0 | Refills: 0 | Status: DISCONTINUED | OUTPATIENT
Start: 2018-03-17 | End: 2018-03-17

## 2018-03-17 RX ORDER — SERTRALINE 25 MG/1
100 TABLET, FILM COATED ORAL AT BEDTIME
Qty: 0 | Refills: 0 | Status: DISCONTINUED | OUTPATIENT
Start: 2018-03-17 | End: 2018-03-17

## 2018-03-17 RX ORDER — CLONAZEPAM 1 MG
0.5 TABLET ORAL DAILY
Qty: 0 | Refills: 0 | Status: DISCONTINUED | OUTPATIENT
Start: 2018-03-17 | End: 2018-03-17

## 2018-03-17 RX ORDER — MAGNESIUM OXIDE/MAG AA CHELATE 133 MG
1 TABLET ORAL
Qty: 0 | Refills: 0 | COMMUNITY

## 2018-03-17 RX ORDER — SERTRALINE 25 MG/1
100 TABLET, FILM COATED ORAL AT BEDTIME
Qty: 0 | Refills: 0 | Status: DISCONTINUED | OUTPATIENT
Start: 2018-03-17 | End: 2018-03-23

## 2018-03-17 RX ORDER — OLANZAPINE 15 MG/1
2.5 TABLET, FILM COATED ORAL ONCE
Qty: 0 | Refills: 0 | Status: COMPLETED | OUTPATIENT
Start: 2018-03-17 | End: 2018-03-17

## 2018-03-17 RX ORDER — SERTRALINE 25 MG/1
100 TABLET, FILM COATED ORAL ONCE
Qty: 0 | Refills: 0 | Status: COMPLETED | OUTPATIENT
Start: 2018-03-17 | End: 2018-03-17

## 2018-03-17 RX ORDER — ATORVASTATIN CALCIUM 80 MG/1
20 TABLET, FILM COATED ORAL AT BEDTIME
Qty: 0 | Refills: 0 | Status: DISCONTINUED | OUTPATIENT
Start: 2018-03-17 | End: 2018-03-18

## 2018-03-17 RX ORDER — CLONAZEPAM 1 MG
0.5 TABLET ORAL
Qty: 0 | Refills: 0 | Status: DISCONTINUED | OUTPATIENT
Start: 2018-03-17 | End: 2018-03-23

## 2018-03-17 RX ORDER — LANOLIN ALCOHOL/MO/W.PET/CERES
3 CREAM (GRAM) TOPICAL AT BEDTIME
Qty: 0 | Refills: 0 | Status: DISCONTINUED | OUTPATIENT
Start: 2018-03-17 | End: 2018-03-18

## 2018-03-17 RX ORDER — CLONAZEPAM 1 MG
1 TABLET ORAL AT BEDTIME
Qty: 0 | Refills: 0 | Status: DISCONTINUED | OUTPATIENT
Start: 2018-03-17 | End: 2018-03-23

## 2018-03-17 RX ORDER — OLANZAPINE 15 MG/1
2.5 TABLET, FILM COATED ORAL AT BEDTIME
Qty: 0 | Refills: 0 | Status: DISCONTINUED | OUTPATIENT
Start: 2018-03-17 | End: 2018-03-23

## 2018-03-17 RX ORDER — ERTAPENEM SODIUM 1 G/1
1000 INJECTION, POWDER, LYOPHILIZED, FOR SOLUTION INTRAMUSCULAR; INTRAVENOUS EVERY 24 HOURS
Qty: 0 | Refills: 0 | Status: DISCONTINUED | OUTPATIENT
Start: 2018-03-18 | End: 2018-03-22

## 2018-03-17 RX ORDER — CLOPIDOGREL BISULFATE 75 MG/1
75 TABLET, FILM COATED ORAL DAILY
Qty: 0 | Refills: 0 | Status: DISCONTINUED | OUTPATIENT
Start: 2018-03-17 | End: 2018-03-23

## 2018-03-17 RX ORDER — QUETIAPINE FUMARATE 200 MG/1
100 TABLET, FILM COATED ORAL
Qty: 0 | Refills: 0 | Status: DISCONTINUED | OUTPATIENT
Start: 2018-03-17 | End: 2018-03-17

## 2018-03-17 RX ORDER — OLANZAPINE 15 MG/1
2.5 TABLET, FILM COATED ORAL AT BEDTIME
Qty: 0 | Refills: 0 | Status: DISCONTINUED | OUTPATIENT
Start: 2018-03-17 | End: 2018-03-17

## 2018-03-17 RX ORDER — ERTAPENEM SODIUM 1 G/1
1000 INJECTION, POWDER, LYOPHILIZED, FOR SOLUTION INTRAMUSCULAR; INTRAVENOUS ONCE
Qty: 0 | Refills: 0 | Status: COMPLETED | OUTPATIENT
Start: 2018-03-17 | End: 2018-03-17

## 2018-03-17 RX ORDER — ENOXAPARIN SODIUM 100 MG/ML
30 INJECTION SUBCUTANEOUS EVERY 24 HOURS
Qty: 0 | Refills: 0 | Status: DISCONTINUED | OUTPATIENT
Start: 2018-03-17 | End: 2018-03-23

## 2018-03-17 RX ORDER — FAMOTIDINE 10 MG/ML
20 INJECTION INTRAVENOUS
Qty: 0 | Refills: 0 | Status: DISCONTINUED | OUTPATIENT
Start: 2018-03-17 | End: 2018-03-18

## 2018-03-17 RX ORDER — ACETAMINOPHEN 500 MG
1000 TABLET ORAL ONCE
Qty: 0 | Refills: 0 | Status: COMPLETED | OUTPATIENT
Start: 2018-03-17 | End: 2018-03-18

## 2018-03-17 RX ORDER — POTASSIUM CHLORIDE 20 MEQ
20 PACKET (EA) ORAL ONCE
Qty: 0 | Refills: 0 | Status: DISCONTINUED | OUTPATIENT
Start: 2018-03-17 | End: 2018-03-17

## 2018-03-17 RX ORDER — CLONAZEPAM 1 MG
0.5 TABLET ORAL
Qty: 0 | Refills: 0 | Status: DISCONTINUED | OUTPATIENT
Start: 2018-03-17 | End: 2018-03-17

## 2018-03-17 RX ORDER — METHADONE HYDROCHLORIDE 40 MG/1
10 TABLET ORAL ONCE
Qty: 0 | Refills: 0 | Status: DISCONTINUED | OUTPATIENT
Start: 2018-03-17 | End: 2018-03-17

## 2018-03-17 RX ORDER — MAGNESIUM SULFATE 500 MG/ML
2 VIAL (ML) INJECTION ONCE
Qty: 0 | Refills: 0 | Status: COMPLETED | OUTPATIENT
Start: 2018-03-17 | End: 2018-03-17

## 2018-03-17 RX ORDER — ERTAPENEM SODIUM 1 G/1
INJECTION, POWDER, LYOPHILIZED, FOR SOLUTION INTRAMUSCULAR; INTRAVENOUS
Qty: 0 | Refills: 0 | Status: DISCONTINUED | OUTPATIENT
Start: 2018-03-17 | End: 2018-03-22

## 2018-03-17 RX ORDER — MAGNESIUM SULFATE 500 MG/ML
1 VIAL (ML) INJECTION ONCE
Qty: 0 | Refills: 0 | Status: COMPLETED | OUTPATIENT
Start: 2018-03-17 | End: 2018-03-18

## 2018-03-17 RX ORDER — POTASSIUM CHLORIDE 20 MEQ
20 PACKET (EA) ORAL ONCE
Qty: 0 | Refills: 0 | Status: COMPLETED | OUTPATIENT
Start: 2018-03-17 | End: 2018-03-17

## 2018-03-17 RX ORDER — VANCOMYCIN HCL 1 G
750 VIAL (EA) INTRAVENOUS EVERY 24 HOURS
Qty: 0 | Refills: 0 | Status: DISCONTINUED | OUTPATIENT
Start: 2018-03-17 | End: 2018-03-17

## 2018-03-17 RX ORDER — IPRATROPIUM/ALBUTEROL SULFATE 18-103MCG
3 AEROSOL WITH ADAPTER (GRAM) INHALATION EVERY 6 HOURS
Qty: 0 | Refills: 0 | Status: DISCONTINUED | OUTPATIENT
Start: 2018-03-17 | End: 2018-03-23

## 2018-03-17 RX ADMIN — SERTRALINE 100 MILLIGRAM(S): 25 TABLET, FILM COATED ORAL at 03:02

## 2018-03-17 RX ADMIN — CLOPIDOGREL BISULFATE 75 MILLIGRAM(S): 75 TABLET, FILM COATED ORAL at 13:44

## 2018-03-17 RX ADMIN — FAMOTIDINE 20 MILLIGRAM(S): 10 INJECTION INTRAVENOUS at 19:47

## 2018-03-17 RX ADMIN — Medication 3 MILLILITER(S): at 13:47

## 2018-03-17 RX ADMIN — Medication 3 MILLILITER(S): at 06:47

## 2018-03-17 RX ADMIN — Medication 10 MILLIGRAM(S): at 03:03

## 2018-03-17 RX ADMIN — ENOXAPARIN SODIUM 30 MILLIGRAM(S): 100 INJECTION SUBCUTANEOUS at 06:47

## 2018-03-17 RX ADMIN — OLANZAPINE 2.5 MILLIGRAM(S): 15 TABLET, FILM COATED ORAL at 22:02

## 2018-03-17 RX ADMIN — METHADONE HYDROCHLORIDE 10 MILLIGRAM(S): 40 TABLET ORAL at 15:36

## 2018-03-17 RX ADMIN — ERTAPENEM SODIUM 120 MILLIGRAM(S): 1 INJECTION, POWDER, LYOPHILIZED, FOR SOLUTION INTRAMUSCULAR; INTRAVENOUS at 19:46

## 2018-03-17 RX ADMIN — QUETIAPINE FUMARATE 100 MILLIGRAM(S): 200 TABLET, FILM COATED ORAL at 06:47

## 2018-03-17 RX ADMIN — Medication 3 MILLIGRAM(S): at 22:17

## 2018-03-17 RX ADMIN — Medication 50 GRAM(S): at 10:32

## 2018-03-17 RX ADMIN — OLANZAPINE 2.5 MILLIGRAM(S): 15 TABLET, FILM COATED ORAL at 03:01

## 2018-03-17 RX ADMIN — Medication 20 MILLIEQUIVALENT(S): at 11:12

## 2018-03-17 RX ADMIN — Medication 0.5 MILLIGRAM(S): at 11:12

## 2018-03-17 RX ADMIN — SERTRALINE 100 MILLIGRAM(S): 25 TABLET, FILM COATED ORAL at 22:02

## 2018-03-17 RX ADMIN — METHADONE HYDROCHLORIDE 10 MILLIGRAM(S): 40 TABLET ORAL at 06:48

## 2018-03-17 RX ADMIN — Medication 3 MILLILITER(S): at 19:20

## 2018-03-17 RX ADMIN — ATORVASTATIN CALCIUM 20 MILLIGRAM(S): 80 TABLET, FILM COATED ORAL at 22:02

## 2018-03-17 RX ADMIN — METHADONE HYDROCHLORIDE 10 MILLIGRAM(S): 40 TABLET ORAL at 03:01

## 2018-03-17 RX ADMIN — Medication 10 MILLIGRAM(S): at 06:48

## 2018-03-17 RX ADMIN — Medication 1 MILLIGRAM(S): at 22:02

## 2018-03-17 RX ADMIN — FAMOTIDINE 20 MILLIGRAM(S): 10 INJECTION INTRAVENOUS at 06:47

## 2018-03-17 RX ADMIN — QUETIAPINE FUMARATE 100 MILLIGRAM(S): 200 TABLET, FILM COATED ORAL at 13:44

## 2018-03-17 RX ADMIN — QUETIAPINE FUMARATE 100 MILLIGRAM(S): 200 TABLET, FILM COATED ORAL at 22:02

## 2018-03-17 NOTE — H&P ADULT - NSHPATTENDINGPLANDISCUSS_GEN_ALL_CORE
ED RNs, night NP, care to be assumed by Dr. Mcgee in am ED RNs, night NP, care to be assumed by Dr. Mcgee in am (d/w Dr. Mcgee palliative care consult)

## 2018-03-17 NOTE — H&P ADULT - PROBLEM SELECTOR PLAN 1
pt with significant PCN allergy, based on HCAP algorithm for treatment will cont vancomycin with levaquin for now  f/u cultures  oxygenating well  monitor respiratory status closely

## 2018-03-17 NOTE — CONSULT NOTE ADULT - SUBJECTIVE AND OBJECTIVE BOX
Pulmonary Consult Note    MYRIAM HEATH  MRN-[id number]    Chief Complaint: Patient is a 67y old  Female who presents with a chief complaint of cough (17 Mar 2018 01:57)      HPI:  67yFemale  Pt well known to service. Hx of severe persistant asthma and progressive dementia. Presented with declining mental status. As per  increasing SOB yesterday which is significantly improved today    ROS:  MS change    All other systems reviewed and negative    PAST MEDICAL HISTORY: HEALTH ISSUES - PROBLEM Dx:  Chronic low back pain, unspecified back pain laterality, with sciatica presence unspecified: Chronic low back pain, unspecified back pain laterality, with sciatica presence unspecified  Asthma, unspecified asthma severity, unspecified whether complicated, unspecified whether persistent: Asthma, unspecified asthma severity, unspecified whether complicated, unspecified whether persistent  Hyperlipidemia, unspecified hyperlipidemia type: Hyperlipidemia, unspecified hyperlipidemia type  Early onset Alzheimer's disease with behavioral disturbance: Early onset Alzheimer&#x27;s disease with behavioral disturbance  Gastroesophageal reflux disease, esophagitis presence not specified: Gastroesophageal reflux disease, esophagitis presence not specified  Cerebrovascular accident (CVA), unspecified mechanism: Cerebrovascular accident (CVA), unspecified mechanism  Pneumonia due to infectious organism, unspecified laterality, unspecified part of lung: Pneumonia due to infectious organism, unspecified laterality, unspecified part of lung          FAMILY HISTORY: FAMILY HISTORY:  Family history of dementia (Mother)  Family history of colon cancer (Father)      SOCIAL HISTORY:     ACTIVE MEDICATION LIST:  MEDICATIONS  (STANDING):  ALBUTerol/ipratropium for Nebulization 3 milliLiter(s) Nebulizer every 6 hours  atorvastatin 20 milliGRAM(s) Oral at bedtime  clonazePAM Tablet 1 milliGRAM(s) Oral at bedtime  clonazePAM Tablet 0.5 milliGRAM(s) Oral daily  clopidogrel Tablet 75 milliGRAM(s) Oral daily  enoxaparin Injectable 30 milliGRAM(s) SubCutaneous every 24 hours  famotidine    Tablet 20 milliGRAM(s) Oral two times a day  levoFLOXacin IVPB 750 milliGRAM(s) IV Intermittent every 24 hours  melatonin 3 milliGRAM(s) Oral at bedtime  methadone    Tablet 10 milliGRAM(s) Oral three times a day before meals  OLANZapine 2.5 milliGRAM(s) Oral at bedtime  predniSONE   Tablet 10 milliGRAM(s) Oral daily  QUEtiapine 100 milliGRAM(s) Oral three times a day  sertraline 100 milliGRAM(s) Oral at bedtime  vancomycin  IVPB 750 milliGRAM(s) IV Intermittent every 24 hours    MEDICATIONS  (PRN):      EXAM:  Vital Signs Last 24 Hrs  T(C): 36.4 (17 Mar 2018 05:02), Max: 38.4 (16 Mar 2018 17:20)  T(F): 97.6 (17 Mar 2018 05:02), Max: 101.2 (16 Mar 2018 17:20)  HR: 86 (17 Mar 2018 05:02) (86 - 104)  BP: 131/84 (17 Mar 2018 05:02) (131/84 - 151/87)  BP(mean): 105 (17 Mar 2018 01:57) (105 - 105)  RR: 18 (17 Mar 2018 05:02) (18 - 20)  SpO2: 94% (17 Mar 2018 05:02) (94% - 100%)  GENERAL: No acute distress  NEURO: Responsive but confused.  LUNGS: Few rhonchi predom. right. Rare crackles. No wheezing  CV: S1/S2, no murmur  ABDOMEN: +BS, nontender  EXTREMITIES: no clubbing, cyanosis, edema  LYMPH: no appreciable lymphadenopathy  SKIN: warm and dry, no rashes    LABS/IMAGING: reviewed    PROBLEM LIST:  67yFemale with HEALTH ISSUES - PROBLEM Dx:  Chronic low back pain, unspecified back pain laterality, with sciatica presence unspecified: Chronic low back pain, unspecified back pain laterality, with sciatica presence unspecified  Asthma, unspecified asthma severity, unspecified whether complicated, unspecified whether persistent: Asthma, unspecified asthma severity, unspecified whether complicated, unspecified whether persistent  Hyperlipidemia, unspecified hyperlipidemia type: Hyperlipidemia, unspecified hyperlipidemia type  Early onset Alzheimer's disease with behavioral disturbance: Early onset Alzheimer&#x27;s disease with behavioral disturbance  Gastroesophageal reflux disease, esophagitis presence not specified: Gastroesophageal reflux disease, esophagitis presence not specified  Cerebrovascular accident (CVA), unspecified mechanism: Cerebrovascular accident (CVA), unspecified mechanism  Pneumonia due to infectious organism, unspecified laterality, unspecified part of lung: Pneumonia due to infectious organism, unspecified laterality, unspecified part of lung        Hx of severe asthma with significant CAP.   Cannot exclude aspiration.    RECS:  Continue levaquin  Continue bronchodilators   Consider Speech and swallow eval. Will discuss with family/Dr Tito.  Repeat cxr in 2-3 days  Check O2 sat R/A    -    Thank you for this consultation, please feel free to call with any questions [phone]  Reagan Plasencia MD

## 2018-03-17 NOTE — H&P ADULT - MENTAL STATUS
alert, asking to have "Caleb do the bills", family holding patients hand and verbally de-escalating agitation

## 2018-03-17 NOTE — CONSULT NOTE ADULT - ASSESSMENT
67 f with advanced and early onset alzheimers disease, h/o CVA, asthma, anxiety, chronic LBP requiring methadone, hld, GERD, just discharged 3/9 for back pain now p/w worsening cough and poor PO intake for the last few days.  Pt has been having worsening dysphagia and had one episode of vomiting. Found to have fever to 101F, leukocytosis 14 and new opacities multifocal on CXR. h/o rash to sulfa and penicillins  RVP negative    #ASpiration pneumonia  on levaquin, vancomycin  blood cx testing 67 f with advanced and early onset alzheimers disease, h/o CVA, asthma, anxiety, chronic LBP requiring methadone, hld, GERD, just discharged 3/9 for back pain now p/w worsening cough and poor PO intake for the last few days.  Pt has been having worsening dysphagia and had one episode of vomiting. Found to have fever to 101F, leukocytosis 14 and new opacities multifocal on CXR. h/o rash to sulfa and penicillins  RVP negative h/o rash to penicillin and sulfa    #Aspiration pneumonia  On levaquin, vancomycin  blood cx testing

## 2018-03-17 NOTE — PROGRESS NOTE ADULT - ASSESSMENT
: Pneumonia due to infectious organism   pt with significant PCN allergy,  will cont vancomycin with levaquin for now  f/u cultures  oxygenating well  monitor respiratory status closely.   pulm eval  id eval      ·  Problem: Cerebrovascular accident (CVA), unspecified mechanism.  Plan: cont plavix.       ·  Problem: Gastroesophageal reflux disease, esophagitis presence not specified.  Plan: cont pepcid.       ·  Problem: Early onset Alzheimer's disease with behavioral disturbance.  Plan: pt with advanced disease   is HCP and he and daughters all agree to maximize quality of life and avoid patient suffering  dnr/dni in chart   and family are interested in discussing patient case and goals with palliative care service        ·  Problem: Hyperlipidemia, unspecified hyperlipidemia type.  Plan: cont lipitor  zetia not on formulary.       Problem: Asthma, unspecified asthma severity, unspecified whether complicated, unspecified whether persistent. Plan:  states pt with difficulty taking inhalers and zyflo  on chronic prednisone for asthma, cont 10mg for now  will give duoneb q6hr for now and monitor        ·  Problem: Chronic low back pain, unspecified back pain laterality, with sciatica presence unspecified.  Plan: cont methadone.

## 2018-03-17 NOTE — CONSULT NOTE ADULT - ASSESSMENT
Dementia of Alzheimer type by history  Suspect delirium from pneumonia. Agitation from pain syndrome.   Discussed with  and daughter that there are risks of giving current psychiatric medication regimen including but not limited to cardiorespiratory collapse and death from combining opiates, benzodiazepines, neuroleptics, and Methadone.  aware of this and wants to continue all her psychiatric meds and methadone to treat her agitation and pain.     Recommend  Check QTc and follow QOD. If QTc is 500ms or greater, hold Methadone, Seroquel, Zyprexa.  Avoid IM or IV Ativan and IM Zyprexa (both combined can cause respiratory failure)  Prior to giving any psychiatric med or Methadone, check respiratory rate and hold psychiatric meds or Methadone if respiratory rate is 10 or less.   For severe agitation can rx. Haldol 1mg IV q8h prn (hold if QTc is 500ms or greater). She will be less agitated with IV vs. IM Haldol (less EPS with former).  Will follow with you here.   Thank you.    Irvin Reyes M.D.  Psychiatry  [phone]

## 2018-03-17 NOTE — CONSULT NOTE ADULT - SUBJECTIVE AND OBJECTIVE BOX
HPI:  67 f with advanced and early onset alzheimers disease, h/o CVA, asthma, anxiety, chronic LBP requiring methadone, hld, GERD, just discharged 3/ for back pain now p/w worsening cough and poor PO intake for the last few days.  Pt has been having worsening dysphagia and had one episode of vomiting.  Pt  and two daughters are at bedside.  They state that patient was severely ill earlier in the day and they thought that "she was going to go".  Their hope is to have her be comfortable and not suffer.  In 2013 they had goals of care discussions and, as hcp and poa were established, also established dnr.      In ED febrile to 101.2 with CXR c/w new multifocal opacties.  Given vanco and levaquin along with tylenol, klonopin 1mg total and seroquel 100mg. (17 Mar 2018 01:57)      PAST MEDICAL & SURGICAL HISTORY:  CVA (cerebral vascular accident)  Fatty pancreas  PNA (pneumonia)  Pulmonary HTN  IGT (impaired glucose tolerance)  Ulcerative colitis  Acid reflux  Anxiety  Depression  Mouth sores  HLD (hyperlipidemia)  Asthma  Humeral head fracture  H/O: hysterectomy  H/O cataract extraction, left  History of knee replacement      Allergies  ASA; dye contrast (Anaphylaxis)  aspirin (Short breath)  penicillin (Short breath; Rash)  sulfa drugs (Short breath; Rash)        ANTIMICROBIALS:  levoFLOXacin IVPB 750 every 24 hours  vancomycin  IVPB 750 every 24 hours      OTHER MEDS: MEDICATIONS  (STANDING):  ALBUTerol/ipratropium for Nebulization 3 every 6 hours  atorvastatin 20 at bedtime  clonazePAM Tablet 1 at bedtime  clonazePAM Tablet 0.5 daily  clopidogrel Tablet 75 daily  enoxaparin Injectable 30 every 24 hours  famotidine    Tablet 20 two times a day  melatonin 3 at bedtime  methadone    Tablet 10 three times a day before meals  OLANZapine 2.5 at bedtime  predniSONE   Tablet 10 daily  QUEtiapine 100 three times a day  sertraline 100 at bedtime      SOCIAL HISTORY:  [ ] etoh [ ] tobacco [ ] former smoker [ ] IVDU    FAMILY HISTORY:  Family history of dementia (Mother)  Family history of colon cancer (Father)      REVIEW OF SYSTEMS  [  ] ROS unobtainable because:    [  ] All other systems negative except as noted below:	    Constitutional:  [ ] fever [ ] chills  [ ] weight loss  [ ] weakness  Skin:  [ ] rash [ ] phlebitis	  Eyes: [ ] icterus [ ] pain  [ ] discharge	  ENMT: [ ] sore throat  [ ] thrush [ ] ulcers [ ] exudates  Respiratory: [ ] dyspnea [ ] hemoptysis [ ] cough [ ] sputum	  Cardiovascular:  [ ] chest pain [ ] palpitations [ ] edema	  Gastrointestinal:  [ ] nausea [ ] vomiting [ ] diarrhea [ ] constipation [ ] pain	  Genitourinary:  [ ] dysuria [ ] frequency [ ] hematuria [ ] discharge [ ] flank pain  [ ] incontinence  Musculoskeletal:  [ ] myalgias [ ] arthralgias [ ] arthritis  [ ] back pain  Neurological:  [ ] headache [ ] seizures  [ ] confusion/altered mental status  Psychiatric:  [ ] anxiety [ ] depression	  Hematology/Lymphatics:  [ ] lymphadenopathy  Endocrine:  [ ] adrenal [ ] thyroid  Allergic/Immunologic:	 [ ] transplant [ ] seasonal    Vital Signs Last 24 Hrs  T(F): 97.9 (18 @ 10:01), Max: 101.2 (18 @ 17:20)    Vital Signs Last 24 Hrs  HR: 85 (18 @ 10:01) (85 - 104)  BP: 131/84 (18 @ 05:02) (131/84 - 151/87)  RR: 18 (18 @ 05:02)  SpO2: 94% (18 @ 05:02) (94% - 100%)  Wt(kg): --    PHYSICAL EXAM:  General: non-toxic  HEAD/EYES: anicteric, PERRL  ENT:  supple  Cardiovascular:   S1, S2  Respiratory:  clear bilaterally  GI:  soft, non-tender, normal bowel sounds  :  no CVA tenderness   Musculoskeletal:  no synovitis  Neurologic:  grossly non-focal  Skin:  no rash  Lymph: no lymphadenopathy  Psychiatric:  appropriate affect  Vascular:  no phlebitis                                9.0    13.01 )-----------( 291      ( 17 Mar 2018 08:29 )             29.1           136  |  100  |  20  ----------------------------<  53<L>  3.4<L>   |  20<L>  |  0.57    Ca    8.2<L>      17 Mar 2018 05:57  Phos  3.7       Mg     1.2         TPro  6.1  /  Alb  3.0<L>  /  TBili  0.5  /  DBili  x   /  AST  19  /  ALT  21  /  AlkPhos  89        Urinalysis Basic - ( 16 Mar 2018 17:36 )    Color: Yellow / Appearance: Clear / S.016 / pH: x  Gluc: x / Ketone: Negative  / Bili: Negative / Urobili: Negative   Blood: x / Protein: Trace / Nitrite: Negative   Leuk Esterase: Negative / RBC: 0-2 /HPF / WBC 0-2 /HPF   Sq Epi: x / Non Sq Epi: x / Bacteria: x        MICROBIOLOGY:  .Urine Catheterized  18   No growth  --  --              v    Rapid RVP Result: NotDetec ( @ 20:57)          RADIOLOGY:  < from: Xray Chest 1 View- PORTABLE-Urgent (18 @ 21:42) >  FINDINGS:       Axial opacities are noted throughout the right lung. There appears to be   small amount of right pleural fluid failure. There are increased   reticular opacities bilaterally.    There is no pneumothorax.  The heart size is enlarged.  There isno acute osseous abnormality.    IMPRESSION:    New multifocal opacities since 3/7/2018 suggestive of multifocal   pneumonia versus asymmetric pulmonary edema.    < end of copied text > HPI:  67 f with advanced and early onset alzheimers disease, h/o CVA, asthma, anxiety, chronic LBP requiring methadone, hld, GERD, just discharged 3/9 for back pain now p/w worsening cough and poor PO intake for the last few days.  Pt has been having worsening dysphagia and had one episode of vomiting.  Pt  and two daughters are at bedside.  They state that patient was severely ill earlier in the day and they thought that "she was going to go".  Their hope is to have her be comfortable and not suffer.  In  they had goals of care discussions and, as hcp and poa were established, also established dnr.      In ED febrile to 101.2 with CXR c/w new multifocal opacties.  Given vanco and levaquin along with tylenol, klonopin 1mg total and seroquel 100mg. (17 Mar 2018 01:57)  ID note-above reviewed. Patient brought in by family for fever to 102 chills, worsening cough and weakness. She had 1 episode of vomiting prior to arrival. Patient has progressive alzheimer's and does not normally communicate when something is bothering her. Since she has been here family has noticed a swelling at the right mandibular aspect    PAST MEDICAL & SURGICAL HISTORY:  CVA (cerebral vascular accident)  Fatty pancreas  PNA (pneumonia)  Pulmonary HTN  IGT (impaired glucose tolerance)  Ulcerative colitis  Acid reflux  Anxiety  Depression  Mouth sores  HLD (hyperlipidemia)  Asthma  Humeral head fracture  H/O: hysterectomy  H/O cataract extraction, left  History of knee replacement      Allergies  ASA; dye contrast (Anaphylaxis)  aspirin (Short breath)  penicillin (Short breath; Rash)  sulfa drugs (Short breath; Rash)        ANTIMICROBIALS:  levoFLOXacin IVPB 750 every 24 hours  vancomycin  IVPB 750 every 24 hours      OTHER MEDS: MEDICATIONS  (STANDING):  ALBUTerol/ipratropium for Nebulization 3 every 6 hours  atorvastatin 20 at bedtime  clonazePAM Tablet 1 at bedtime  clonazePAM Tablet 0.5 daily  clopidogrel Tablet 75 daily  enoxaparin Injectable 30 every 24 hours  famotidine    Tablet 20 two times a day  melatonin 3 at bedtime  methadone    Tablet 10 three times a day before meals  OLANZapine 2.5 at bedtime  predniSONE   Tablet 10 daily  QUEtiapine 100 three times a day  sertraline 100 at bedtime      SOCIAL HISTORY:  [ ] etoh [ ] tobacco [ ] former smoker [ ] IVDU    FAMILY HISTORY:  Family history of dementia (Mother)  Family history of colon cancer (Father)      REVIEW OF SYSTEMS  [  ] ROS unobtainable because:    [  ] All other systems negative except as noted below:	    Constitutional:  [ ] fever [ ] chills  [ ] weight loss  [ ] weakness  Skin:  [ ] rash [ ] phlebitis	  Eyes: [ ] icterus [ ] pain  [ ] discharge	  ENMT: [ ] sore throat  [ ] thrush [ ] ulcers [ ] exudates  Respiratory: [ ] dyspnea [ ] hemoptysis [ ] cough [ ] sputum	  Cardiovascular:  [ ] chest pain [ ] palpitations [ ] edema	  Gastrointestinal:  [ ] nausea [ ] vomiting [ ] diarrhea [ ] constipation [ ] pain	  Genitourinary:  [ ] dysuria [ ] frequency [ ] hematuria [ ] discharge [ ] flank pain  [ ] incontinence  Musculoskeletal:  [ ] myalgias [ ] arthralgias [ ] arthritis  [ ] back pain  Neurological:  [ ] headache [ ] seizures  [ ] confusion/altered mental status  Psychiatric:  [ ] anxiety [ ] depression	  Hematology/Lymphatics:  [ ] lymphadenopathy  Endocrine:  [ ] adrenal [ ] thyroid  Allergic/Immunologic:	 [ ] transplant [ ] seasonal    Vital Signs Last 24 Hrs  T(F): 97.9 (18 @ 10:01), Max: 101.2 (18 @ 17:20)    Vital Signs Last 24 Hrs  HR: 85 (18 @ 10:01) (85 - 104)  BP: 131/84 (18 @ 05:02) (131/84 - 151/87)  RR: 18 (18 @ 05:02)  SpO2: 94% (18 @ 05:02) (94% - 100%)  Wt(kg): --    PHYSICAL EXAM:  General: non-toxic  HEAD/EYES: anicteric, PERRL  ENT:  supple  Cardiovascular:   S1, S2  Respiratory:  clear bilaterally  GI:  soft, non-tender, normal bowel sounds  :  no CVA tenderness   Musculoskeletal:  no synovitis  Neurologic:  grossly non-focal  Skin:  no rash  Lymph: no lymphadenopathy  Psychiatric:  appropriate affect  Vascular:  no phlebitis                                9.0    13.01 )-----------( 291      ( 17 Mar 2018 08:29 )             29.1           136  |  100  |  20  ----------------------------<  53<L>  3.4<L>   |  20<L>  |  0.57    Ca    8.2<L>      17 Mar 2018 05:57  Phos  3.7     -  Mg     1.2     -    TPro  6.1  /  Alb  3.0<L>  /  TBili  0.5  /  DBili  x   /  AST  19  /  ALT  21  /  AlkPhos  89  -      Urinalysis Basic - ( 16 Mar 2018 17:36 )    Color: Yellow / Appearance: Clear / S.016 / pH: x  Gluc: x / Ketone: Negative  / Bili: Negative / Urobili: Negative   Blood: x / Protein: Trace / Nitrite: Negative   Leuk Esterase: Negative / RBC: 0-2 /HPF / WBC 0-2 /HPF   Sq Epi: x / Non Sq Epi: x / Bacteria: x        MICROBIOLOGY:  .Urine Catheterized  18   No growth  --  --              v    Rapid RVP Result: NotDetec ( @ 20:57)          RADIOLOGY:  < from: Xray Chest 1 View- PORTABLE-Urgent (18 @ 21:42) >  FINDINGS:       Axial opacities are noted throughout the right lung. There appears to be   small amount of right pleural fluid failure. There are increased   reticular opacities bilaterally.    There is no pneumothorax.  The heart size is enlarged.  There isno acute osseous abnormality.    IMPRESSION:    New multifocal opacities since 3/7/2018 suggestive of multifocal   pneumonia versus asymmetric pulmonary edema.    < end of copied text >

## 2018-03-17 NOTE — H&P ADULT - PROBLEM SELECTOR PLAN 4
pt with advanced disease   is HCP and he and daughters all agree to maximize quality of life and avoid patient suffering  dnr/dni in chart   and family are interested in discussing patient case and goals with palliative care service though not ready to consider hospice at this point

## 2018-03-17 NOTE — CHART NOTE - NSCHARTNOTEFT_GEN_A_CORE
Informed by RN that she cannot see konopin, seroquel, and zyprexa orders in her MAR and that  was extremely irate regarding the presumed changes in medication that he disagreed with and that he was not informed about these changes.  reassured that the orders appear active, that there were no changes made, that should her Qtc be above 500 that changes may be made to "protect the patient's heart."   Upon EMR review, the active orders did not appear in the MAR- worklist. The active medications reordered, RN called pharmacy to verify the medications. During this process, the  became more irate, yelling at staff regarding the dissatisfaction of care he believes that the pt is receiving. Attempted to reassure family that the medications were in the process of being resolved, that no medications were late or skipped. He then shouts how "if anything were to happen to her, you will all be held accountable ...etc." On exam of the patient, pt appears slightly disheveled and restless but able to smile and follows commands. She does not appear to be in pain or agitated, she does not appear to be trying to get out of bed. Will continue with enhanced supervision overnight, pt will receive her home medications. Will monitor Qtc and d/w team in AM.     Le Palmer NP [id number]

## 2018-03-17 NOTE — H&P ADULT - PROBLEM SELECTOR PLAN 6
states pt with difficulty taking inhalers and zyflo  on chronic prednisone for asthma, cont 10mg for now  will give duoneb q6hr for now and monitor  no wheezing on exam and pt breathing comfortably

## 2018-03-17 NOTE — CONSULT NOTE ADULT - ATTENDING COMMENTS
Patient seen and examined with Dr. Ramos  I agree with her findings and plans as noted above  Patient with dementia, likely aspiration pneumonia  Patient with multiple antibiotic allergy history  Would give Ertapenem 1 gram/day  blood cultures x 2 sets    Ever Hadley MD  [phone]  After 5pm/weekends [phone]

## 2018-03-17 NOTE — H&P ADULT - FAMILY HISTORY
Father  Still living? Unknown  Family history of colon cancer, Age at diagnosis: Age Unknown     Mother  Still living? Unknown  Family history of dementia, Age at diagnosis: Age Unknown

## 2018-03-17 NOTE — CONSULT NOTE ADULT - SUBJECTIVE AND OBJECTIVE BOX
Chart reviewed and patient seen by undersigned    Asked to consult for psychiatric med recommendations    Historians include chart, NP Ritu, old chart, the pt's daughter at bedside, and  on the telephone.     History of Present Illness  The patient is a 67 year old  WF diagnosed with Alzheimer dementia approx. 4 years ago. Recently discharged here. Was seen by house psychiatry for agitation and pt. required numerous meds (which  says calmed her down) including Seroquel 100mg qa.m., Klonopin 0.5mg qmorning, Seroquel 100mg qafternoon, Klonopin 1mg qp.m., Seroquel 100mg qp.m., Zoloft 100mg qp.m., melatonin 3mg qhs, and Zyprexa 2.5mg qhs. Also on Methadone 30mg/day for pain.  says this regimen has helped and does not want the regimen changed. Pt. now admitted with pneumonia.     Past Psychiatric History  She failed trials of Haldol, Xanax, Depakote (daughter says pt. became more agitated on these meds). Has had cognitive decline in recent years, worse for the past month. Never suicidal. 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 Psychiatric meds given by neurologist Dr. Cheek.     Psychosocial History  Lives with family. Has three daughters. No substance abuse history. Did not work outside the home.     PAST MEDICAL & SURGICAL HISTORY:  CVA (cerebral vascular accident)  Fatty pancreas  PNA (pneumonia)  Pulmonary HTN  IGT (impaired glucose tolerance)  Ulcerative colitis  Acid reflux  Anxiety  Depression  Mouth sores  HLD (hyperlipidemia)  Asthma  Humeral head fracture  H/O: hysterectomy  H/O cataract extraction, left  History of knee replacement      FAMILY HISTORY:  Family history of dementia (Mother)  Family history of colon cancer (Father)      Vital Signs Last 24 Hrs  T(C): 37.1 (17 Mar 2018 14:36), Max: 37.1 (17 Mar 2018 14:36)  T(F): 98.7 (17 Mar 2018 14:36), Max: 98.7 (17 Mar 2018 14:36)  HR: 111 (17 Mar 2018 14:36) (85 - 111)  BP: 130/78 (17 Mar 2018 14:36) (130/78 - 151/87)  BP(mean): 105 (17 Mar 2018 01:57) (105 - 105)  RR: 16 (17 Mar 2018 14:36) (16 - 18)  SpO2: 92% (17 Mar 2018 14:36) (92% - 98%)                          9.0    13.01 )-----------( 291      ( 17 Mar 2018 08:29 )             29.1       03-17    136  |  100  |  20  ----------------------------<  53<L>  3.4<L>   |  20<L>  |  0.57    Ca    8.2<L>      17 Mar 2018 05:57  Phos  3.7     03-17  Mg     1.7     03-17    TPro  6.1  /  Alb  3.0<L>  /  TBili  0.5  /  DBili  x   /  AST  19  /  ALT  21  /  AlkPhos  89  03-16            MEDICATIONS  (STANDING):  ALBUTerol/ipratropium for Nebulization 3 milliLiter(s) Nebulizer every 6 hours  atorvastatin 20 milliGRAM(s) Oral at bedtime  clonazePAM Tablet 1 milliGRAM(s) Oral at bedtime  clonazePAM Tablet 0.5 milliGRAM(s) Oral daily  clopidogrel Tablet 75 milliGRAM(s) Oral daily  enoxaparin Injectable 30 milliGRAM(s) SubCutaneous every 24 hours  famotidine    Tablet 20 milliGRAM(s) Oral two times a day  levoFLOXacin IVPB 750 milliGRAM(s) IV Intermittent every 24 hours  melatonin 3 milliGRAM(s) Oral at bedtime  methadone    Tablet 10 milliGRAM(s) Oral three times a day before meals  OLANZapine 2.5 milliGRAM(s) Oral at bedtime  predniSONE   Tablet 10 milliGRAM(s) Oral daily  QUEtiapine 100 milliGRAM(s) Oral three times a day  sertraline 100 milliGRAM(s) Oral at bedtime  vancomycin  IVPB 750 milliGRAM(s) IV Intermittent every 24 hours    MEDICATIONS  (PRN):      Elderly WF in bed, cachectic, calm, cooperative, alert and oriented x  0. Has makeup on and nails manicured. No oral buccal dyskinesias.  No psychomotor abnormalities. Insight and judgment are poor. Speech is minimal with aphasic content. Mumbles incoherently.  No hallucinations nor delusions. The patient denied suicidal and homicidal ideation and plan. Mood is euthymic and affect flat. Attention and concentration, short term memory, and long term memory all impaired.     Suicidal risk assessment  Risk factors include dementia  Protective factors include no past attempts, good social supports, no substance abuse.

## 2018-03-17 NOTE — H&P ADULT - HISTORY OF PRESENT ILLNESS
67 f 67 f with advanced and early onset alzheimers disease, h/o CVA, asthma, anxiety, chronic LBP requiring methadone, hld, GERD, just discharged 3/9 for back pain now p/w worsening cough and poor PO intake for the last few days.  Pt has been having worsening dysphagia and had one episode of vomiting.  Pt  and two daughters are at bedside.  They state that patient was severely ill earlier in the day and they thought that "she was going to go".  Their hope is to have her be comfortable and not suffer.  In 2013 they had goals of care discussions and, as hcp and poa were established, also established dnr.      In ED febrile to 101.2 with CXR c/w new multifocal opacties.  Given vanco and levaquin along with tylenol, klonopin 1mg total and seroquel 100mg.

## 2018-03-18 DIAGNOSIS — R13.12 DYSPHAGIA, OROPHARYNGEAL PHASE: ICD-10-CM

## 2018-03-18 DIAGNOSIS — Z71.89 OTHER SPECIFIED COUNSELING: ICD-10-CM

## 2018-03-18 DIAGNOSIS — R53.2 FUNCTIONAL QUADRIPLEGIA: ICD-10-CM

## 2018-03-18 LAB
ANION GAP SERPL CALC-SCNC: 14 MMOL/L — SIGNIFICANT CHANGE UP (ref 5–17)
BUN SERPL-MCNC: 15 MG/DL — SIGNIFICANT CHANGE UP (ref 7–23)
CALCIUM SERPL-MCNC: 9 MG/DL — SIGNIFICANT CHANGE UP (ref 8.4–10.5)
CHLORIDE SERPL-SCNC: 98 MMOL/L — SIGNIFICANT CHANGE UP (ref 96–108)
CO2 SERPL-SCNC: 22 MMOL/L — SIGNIFICANT CHANGE UP (ref 22–31)
CREAT SERPL-MCNC: 0.64 MG/DL — SIGNIFICANT CHANGE UP (ref 0.5–1.3)
GAS PNL BLDV: SIGNIFICANT CHANGE UP
GLUCOSE SERPL-MCNC: 106 MG/DL — HIGH (ref 70–99)
HCT VFR BLD CALC: 30.8 % — LOW (ref 34.5–45)
HGB BLD-MCNC: 9.7 G/DL — LOW (ref 11.5–15.5)
MAGNESIUM SERPL-MCNC: 2 MG/DL — SIGNIFICANT CHANGE UP (ref 1.6–2.6)
MCHC RBC-ENTMCNC: 26.3 PG — LOW (ref 27–34)
MCHC RBC-ENTMCNC: 31.5 GM/DL — LOW (ref 32–36)
MCV RBC AUTO: 83.5 FL — SIGNIFICANT CHANGE UP (ref 80–100)
NRBC # BLD: 0 /100 WBCS — SIGNIFICANT CHANGE UP (ref 0–0)
PLATELET # BLD AUTO: 342 K/UL — SIGNIFICANT CHANGE UP (ref 150–400)
POTASSIUM SERPL-MCNC: 3.7 MMOL/L — SIGNIFICANT CHANGE UP (ref 3.5–5.3)
POTASSIUM SERPL-SCNC: 3.7 MMOL/L — SIGNIFICANT CHANGE UP (ref 3.5–5.3)
RBC # BLD: 3.69 M/UL — LOW (ref 3.8–5.2)
RBC # FLD: 15.5 % — HIGH (ref 10.3–14.5)
SODIUM SERPL-SCNC: 134 MMOL/L — LOW (ref 135–145)
WBC # BLD: 12.43 K/UL — HIGH (ref 3.8–10.5)
WBC # FLD AUTO: 12.43 K/UL — HIGH (ref 3.8–10.5)

## 2018-03-18 PROCEDURE — 99497 ADVNCD CARE PLAN 30 MIN: CPT | Mod: 25

## 2018-03-18 PROCEDURE — 99223 1ST HOSP IP/OBS HIGH 75: CPT

## 2018-03-18 RX ORDER — DIPHENHYDRAMINE HYDROCHLORIDE AND LIDOCAINE HYDROCHLORIDE AND ALUMINUM HYDROXIDE AND MAGNESIUM HYDRO
5 KIT EVERY 6 HOURS
Qty: 0 | Refills: 0 | Status: DISCONTINUED | OUTPATIENT
Start: 2018-03-18 | End: 2018-03-23

## 2018-03-18 RX ORDER — FLUCONAZOLE 150 MG/1
TABLET ORAL
Qty: 0 | Refills: 0 | Status: DISCONTINUED | OUTPATIENT
Start: 2018-03-18 | End: 2018-03-21

## 2018-03-18 RX ORDER — PANTOPRAZOLE SODIUM 20 MG/1
40 TABLET, DELAYED RELEASE ORAL DAILY
Qty: 0 | Refills: 0 | Status: DISCONTINUED | OUTPATIENT
Start: 2018-03-18 | End: 2018-03-18

## 2018-03-18 RX ORDER — MORPHINE SULFATE 50 MG/1
2 CAPSULE, EXTENDED RELEASE ORAL EVERY 6 HOURS
Qty: 0 | Refills: 0 | Status: DISCONTINUED | OUTPATIENT
Start: 2018-03-18 | End: 2018-03-23

## 2018-03-18 RX ORDER — FLUCONAZOLE 150 MG/1
100 TABLET ORAL EVERY 24 HOURS
Qty: 0 | Refills: 0 | Status: DISCONTINUED | OUTPATIENT
Start: 2018-03-19 | End: 2018-03-21

## 2018-03-18 RX ORDER — SODIUM CHLORIDE 9 MG/ML
1000 INJECTION INTRAMUSCULAR; INTRAVENOUS; SUBCUTANEOUS
Qty: 0 | Refills: 0 | Status: DISCONTINUED | OUTPATIENT
Start: 2018-03-18 | End: 2018-03-20

## 2018-03-18 RX ORDER — FAMOTIDINE 10 MG/ML
20 INJECTION INTRAVENOUS DAILY
Qty: 0 | Refills: 0 | Status: DISCONTINUED | OUTPATIENT
Start: 2018-03-18 | End: 2018-03-22

## 2018-03-18 RX ORDER — ACETAMINOPHEN 500 MG
530 TABLET ORAL ONCE
Qty: 0 | Refills: 0 | Status: COMPLETED | OUTPATIENT
Start: 2018-03-18 | End: 2018-03-18

## 2018-03-18 RX ORDER — FLUCONAZOLE 150 MG/1
100 TABLET ORAL ONCE
Qty: 0 | Refills: 0 | Status: COMPLETED | OUTPATIENT
Start: 2018-03-18 | End: 2018-03-18

## 2018-03-18 RX ADMIN — Medication 212 MILLIGRAM(S): at 21:14

## 2018-03-18 RX ADMIN — ENOXAPARIN SODIUM 30 MILLIGRAM(S): 100 INJECTION SUBCUTANEOUS at 06:23

## 2018-03-18 RX ADMIN — SODIUM CHLORIDE 50 MILLILITER(S): 9 INJECTION INTRAMUSCULAR; INTRAVENOUS; SUBCUTANEOUS at 22:13

## 2018-03-18 RX ADMIN — DIPHENHYDRAMINE HYDROCHLORIDE AND LIDOCAINE HYDROCHLORIDE AND ALUMINUM HYDROXIDE AND MAGNESIUM HYDRO 5 MILLILITER(S): KIT at 19:11

## 2018-03-18 RX ADMIN — FAMOTIDINE 20 MILLIGRAM(S): 10 INJECTION INTRAVENOUS at 16:51

## 2018-03-18 RX ADMIN — Medication 3 MILLILITER(S): at 22:11

## 2018-03-18 RX ADMIN — Medication 100 GRAM(S): at 00:57

## 2018-03-18 RX ADMIN — MORPHINE SULFATE 2 MILLIGRAM(S): 50 CAPSULE, EXTENDED RELEASE ORAL at 09:15

## 2018-03-18 RX ADMIN — DIPHENHYDRAMINE HYDROCHLORIDE AND LIDOCAINE HYDROCHLORIDE AND ALUMINUM HYDROXIDE AND MAGNESIUM HYDRO 5 MILLILITER(S): KIT at 22:12

## 2018-03-18 RX ADMIN — Medication 530 MILLIGRAM(S): at 21:40

## 2018-03-18 RX ADMIN — MORPHINE SULFATE 2 MILLIGRAM(S): 50 CAPSULE, EXTENDED RELEASE ORAL at 22:40

## 2018-03-18 RX ADMIN — MORPHINE SULFATE 2 MILLIGRAM(S): 50 CAPSULE, EXTENDED RELEASE ORAL at 23:00

## 2018-03-18 RX ADMIN — SODIUM CHLORIDE 50 MILLILITER(S): 9 INJECTION INTRAMUSCULAR; INTRAVENOUS; SUBCUTANEOUS at 22:41

## 2018-03-18 RX ADMIN — MORPHINE SULFATE 2 MILLIGRAM(S): 50 CAPSULE, EXTENDED RELEASE ORAL at 08:59

## 2018-03-18 RX ADMIN — MORPHINE SULFATE 2 MILLIGRAM(S): 50 CAPSULE, EXTENDED RELEASE ORAL at 17:15

## 2018-03-18 RX ADMIN — ERTAPENEM SODIUM 120 MILLIGRAM(S): 1 INJECTION, POWDER, LYOPHILIZED, FOR SOLUTION INTRAMUSCULAR; INTRAVENOUS at 19:11

## 2018-03-18 RX ADMIN — Medication 400 MILLIGRAM(S): at 00:01

## 2018-03-18 RX ADMIN — FLUCONAZOLE 50 MILLIGRAM(S): 150 TABLET ORAL at 13:23

## 2018-03-18 RX ADMIN — MORPHINE SULFATE 2 MILLIGRAM(S): 50 CAPSULE, EXTENDED RELEASE ORAL at 16:57

## 2018-03-18 NOTE — CONSULT NOTE ADULT - ASSESSMENT
67 F with advanced early onset dementia, chronic pain, dysphagia, functional quadriplegia, advanced care planning.

## 2018-03-18 NOTE — CONSULT NOTE ADULT - PROBLEM SELECTOR RECOMMENDATION 2
Patient on Methadone 10mg TID at home.  Currently with dysphagia. Unable to take her oral meds.   Agree with Morphine 2mg IV Q6 prn.  If oral route improves, can restart Methadone PO.

## 2018-03-18 NOTE — CONSULT NOTE ADULT - CONSULT REASON
Goals of care
Pneumonia
pneumonia
Family wants no change in psychiatric medications. Is this acceptable?

## 2018-03-18 NOTE — CONSULT NOTE ADULT - PROBLEM SELECTOR RECOMMENDATION 5
20 minutes spent with  and daughter at bedside.  Patient unable to participate.  Discussed families frustrations regarding current care that the patient is receiving.   Discussed progressive nature of the dementia, as well as the families hopes for comfort and dignity.   In full agreement with DNR, no PEG. Also open to hospice referral once patient is discharged.   Chaplaincy offered and accepted.

## 2018-03-18 NOTE — CONSULT NOTE ADULT - SUBJECTIVE AND OBJECTIVE BOX
HPI:  67 f with advanced and early onset alzheimers disease, h/o CVA, asthma, anxiety, chronic LBP requiring methadone, hld, GERD, just discharged 3/9 for back pain now p/w worsening cough and poor PO intake for the last few days.  Pt has been having worsening dysphagia and had one episode of vomiting.  Pt  and two daughters are at bedside.  They state that patient was severely ill earlier in the day and they thought that "she was going to go".  Their hope is to have her be comfortable and not suffer.  In  they had goals of care discussions and, as hcp and poa were established, also established dnr.      In ED febrile to 101.2 with CXR c/w new multifocal opacties.  Given vanco and levaquin along with tylenol, klonopin 1mg total and seroquel 100mg. (17 Mar 2018 01:57)      PERTINENT PMH REVIEWED:  [x ] YES [ ] NO           SOCIAL HISTORY:  Significant other/partner:  [ x] YES  [ ] NO            Children:  [ x] YES  [ ] NO                   Hoahaoism/Spirituality: Baptism  Subtance hx:  [ ] YES   [x ] NO           Tobacco hx:  [ ] YES  [x ] NO             Alcohol hx: [ ] YES  [x ] NO        Home Opiod hx:  [ ] YES  [x ] NO   Living Situation: [x ] Home  [ ] Long term care  [ ] Rehab    REFERRALS:   [x ] Chaplaincy  [x ] Hospice  [ ] Child Life  [x ] Social Work  [ ] Case management [ ] Holistic Therapy     FAMILY HISTORY:  Family history of dementia (Mother)  Family history of colon cancer (Father)    [x ] Family history non contributory     BASELINE ADLs (prior to admission):  Independent [ ] moderately [ ] fully   Dependent   [ ] moderately [ x] fully    ADVANCE DIRECTIVES:  [ x] YES [ ] NO   DNR [x ] YES [ ] NO                      MOLST  [x ] YES [ ] NO    Living Will  [ ] YES [x ] NO    Health Care Proxy [ x] YES  [ ] NO      [ ] Surrogate  [ x] HCP  [ ] Guardian:     Caleb Colon (spouse)                                                             Phone#: [phone]    Allergies    ASA; dye contrast (Anaphylaxis)  aspirin (Short breath)  penicillin (Short breath; Rash)  sulfa drugs (Short breath; Rash)    Intolerances        MEDICATIONS  (STANDING):  ALBUTerol/ipratropium for Nebulization 3 milliLiter(s) Nebulizer every 6 hours  clonazePAM Tablet 1 milliGRAM(s) Oral at bedtime  clonazePAM Tablet 0.5 milliGRAM(s) Oral <User Schedule>  clopidogrel Tablet 75 milliGRAM(s) Oral daily  enoxaparin Injectable 30 milliGRAM(s) SubCutaneous every 24 hours  ertapenem  IVPB 1000 milliGRAM(s) IV Intermittent every 24 hours  ertapenem  IVPB      famotidine Injectable 20 milliGRAM(s) IV Push daily  FIRST- Mouthwash  BLM 5 milliLiter(s) Swish and Spit every 6 hours  fluconAZOLE IVPB      OLANZapine 2.5 milliGRAM(s) Oral at bedtime  predniSONE   Tablet 10 milliGRAM(s) Oral daily  QUEtiapine 100 milliGRAM(s) Oral <User Schedule>  sertraline 100 milliGRAM(s) Oral at bedtime  sodium chloride 0.9%. 1000 milliLiter(s) (50 mL/Hr) IV Continuous <Continuous>    MEDICATIONS  (PRN):  morphine  - Injectable 2 milliGRAM(s) IV Push every 6 hours PRN Severe Pain (7 - 10)      PRESENT SYMPTOMS:  Source: [ ] Patient   [x ] Family   [ ] Team     Pain: [ ] YES [x ] NO  OLDCARTS: unable    Dyspnea: [ ] YES [ ] NO   Anxiety: [ ] YES [ ] NO  Fatigue: [ ] YES [ ] NO   Nausea: [ ] YES [ ] NO  Loss of appetite: [ ] YES [ ] NO   Constipation: [ ] YES [ ] NO     Other Symptoms:  [ ] All other review of systems negative   [x ] Unable to obtain due to poor mentation     Karnofsky Performance Score/Palliative Performance Status Version 2:    20     %  Protein Calorie Malutrition:  [ ] Mild   [ ] Moderate   [x ] Severe     Vital Signs Last 24 Hrs  T(C): 37 (18 Mar 2018 12:24), Max: 38.4 (17 Mar 2018 23:18)  T(F): 98.6 (18 Mar 2018 12:24), Max: 101.2 (17 Mar 2018 23:18)  HR: 104 (18 Mar 2018 12:24) (88 - 111)  BP: 136/83 (18 Mar 2018 12:24) (106/68 - 139/100)  BP(mean): --  RR: 17 (18 Mar 2018 12:24) (16 - 18)  SpO2: 98% (18 Mar 2018 12:24) (91% - 100%)    Physical Exam:    General: [ ] Alert,  A&O x     [x ] lethargic   [ ] Agitated   [ ] Cachexia   HEENT: [ ] Normal   [x ] Dry mouth   [ ] ET Tube    [ ] Trach   Lungs: [ ] Clear [ x] Rhonchi  [ ] Crackles [ ] Wheezing [ ] Tachypnea  [ ] Audible excessive secretions   Cardiovascular:  [x ] Regular rate and rhythm  [ ] Irregular [ ] Tachycardia   [ ] Bradycardia   Abdomen: [x ] Soft  [ ] Distended  [ ]  [x ] +BS  [ x] Non tender [ ] Tender  [ ]PEG   [ ] NGT   Last BM:     Genitourinary: [ ] Normal [x] Incontinent   [ ] Oliguria/Anuria   [ ] Blackman  Musculoskeletal:  [ ] Normal   [ ] Generalized weakness  [ x] Bedbound   Neurological: [ ] No focal deficits  [x ] Cognitive impairment     Skin: [x ] Normal   [ ] Pressure ulcers     LABS:                        9.7    12.43 )-----------( 342      ( 18 Mar 2018 08:22 )             30.8     03-18    134<L>  |  98  |  15  ----------------------------<  106<H>  3.7   |  22  |  0.64    Ca    9.0      18 Mar 2018 06:59  Phos  3.7     03-17  Mg     2.0     03-18    TPro  6.1  /  Alb  3.0<L>  /  TBili  0.5  /  DBili  x   /  AST  19  /  ALT  21  /  AlkPhos  89  03-16    PT/INR - ( 16 Mar 2018 17:42 )   PT: 12.2 sec;   INR: 1.12 ratio         PTT - ( 16 Mar 2018 17:42 )  PTT:33.0 sec  Urinalysis Basic - ( 16 Mar 2018 17:36 )    Color: Yellow / Appearance: Clear / S.016 / pH: x  Gluc: x / Ketone: Negative  / Bili: Negative / Urobili: Negative   Blood: x / Protein: Trace / Nitrite: Negative   Leuk Esterase: Negative / RBC: 0-2 /HPF / WBC 0-2 /HPF   Sq Epi: x / Non Sq Epi: x / Bacteria: x      I&O's Summary    17 Mar 2018 07:01  -  18 Mar 2018 07:00  --------------------------------------------------------  IN: 120 mL / OUT: 0 mL / NET: 120 mL    18 Mar 2018 07:01  -  18 Mar 2018 14:18  --------------------------------------------------------  IN: 240 mL / OUT: 0 mL / NET: 240 mL        RADIOLOGY & ADDITIONAL STUDIES:

## 2018-03-18 NOTE — PROGRESS NOTE ADULT - ASSESSMENT
: Pneumonia due to infectious organism   pt with significant PCN allergy,   seen by id   currently changed to invanz  f/u cultures  monitor respiratory status   pulm leo noted  id leo noted      ·  Problem: Cerebrovascular accident (CVA), unspecified mechanism.  Plan: cont plavix.       ·  Problem: Gastroesophageal reflux disease, esophagitis presence not specified.  Plan: cont pepcid.       ·  Problem: Alzheimer's disease with behavioral disturbance   pt with advanced disease   is HCP and he and daughters all agree to maximize quality of life and avoid patient suffering  dnr/dni i  palliative to see      ·  Problem: Hyperlipidemia, unspecified hyperlipidemia type.  Plan: cont lipitor  zetia not on formulary.       Problem: Asthma, unspecified asthma severity, unspecified whether complicated, unspecified whether persistent. Plan:  states pt with difficulty taking inhalers and zyflo  on chronic prednisone for asthma,   will give duoneb q6hr for now and monitor        ·  Problem: Chronic low back pain, unspecified back pain laterality, with sciatica presence unspecified. =  cont meds prn    pt seen by psych also yesterday   meds adjusted as per psych  difficulty in timing of meds as per     upset with current situation and circumstances  discussed at length with  and need for palliative input   palliative aware     prognosis overall poor : Pneumonia due to infectious organism   pt with significant PCN allergy,   seen by id   currently changed to invanz  f/u cultures  monitor respiratory status   pulm leo noted  id leo noted      ·  Problem: Cerebrovascular accident (CVA), unspecified mechanism.  Plan: cont plavix.       ·  Problem: Gastroesophageal reflux disease, esophagitis presence not specified.  Plan: cont pepcid.       ·  Problem: Alzheimer's disease with behavioral disturbance   pt with advanced disease   is HCP and he and daughters all agree to maximize quality of life and avoid patient suffering  dnr/dni i  palliative to see      ·  Problem: Hyperlipidemia, unspecified hyperlipidemia type.  Plan: cont lipitor  zetia not on formulary.       Problem: Asthma, unspecified asthma severity, unspecified whether complicated, unspecified whether persistent. Plan:  states pt with difficulty taking inhalers and zyflo  on chronic prednisone for asthma,   will give duoneb q6hr for now and monitor        ·  Problem: Chronic low back pain, unspecified back pain laterality, with sciatica presence unspecified.   cont meds prn    pt seen by psych also yesterday   meds adjusted as per psych  difficulty in timing of meds as per     upset with current situation and circumstances  discussed at length with  and need for palliative input   also wants hospice now that pt deteriorating   palliative aware     prognosis overall poor

## 2018-03-18 NOTE — CONSULT NOTE ADULT - PROBLEM SELECTOR RECOMMENDATION 9
Patient with early onset, end stage dementia.  Progressive over years.  Patient is dependent on all of her ADLS.  FAST >7C. Hospice appropriate.

## 2018-03-18 NOTE — CONSULT NOTE ADULT - PROBLEM SELECTOR RECOMMENDATION 3
Patient with what appears to be thrush, likely contributing to dysphagia.  Agree with Diflucan and Nystatin.   Aggressive mouth care.   Aspiration precautions.

## 2018-03-18 NOTE — CHART NOTE - NSCHARTNOTEFT_GEN_A_CORE
Pt was febrile tmax 38.4 rectal overnight, V/S  /68 RR 18 O2 Sat 100 on RA. Pt on ertapenam, ID following. Blood c/s sent x 2.   - [place] [id number]

## 2018-03-19 ENCOUNTER — TRANSCRIPTION ENCOUNTER (OUTPATIENT)
Age: 68
End: 2018-03-19

## 2018-03-19 DIAGNOSIS — Z51.5 ENCOUNTER FOR PALLIATIVE CARE: ICD-10-CM

## 2018-03-19 LAB
ANION GAP SERPL CALC-SCNC: 15 MMOL/L — SIGNIFICANT CHANGE UP (ref 5–17)
BUN SERPL-MCNC: 15 MG/DL — SIGNIFICANT CHANGE UP (ref 7–23)
CALCIUM SERPL-MCNC: 8.8 MG/DL — SIGNIFICANT CHANGE UP (ref 8.4–10.5)
CHLORIDE SERPL-SCNC: 102 MMOL/L — SIGNIFICANT CHANGE UP (ref 96–108)
CO2 SERPL-SCNC: 21 MMOL/L — LOW (ref 22–31)
CREAT SERPL-MCNC: 0.53 MG/DL — SIGNIFICANT CHANGE UP (ref 0.5–1.3)
GLUCOSE SERPL-MCNC: 62 MG/DL — LOW (ref 70–99)
HCT VFR BLD CALC: 28 % — LOW (ref 34.5–45)
HGB BLD-MCNC: 9.2 G/DL — LOW (ref 11.5–15.5)
LEGIONELLA AG UR QL: NEGATIVE — SIGNIFICANT CHANGE UP
MCHC RBC-ENTMCNC: 26.6 PG — LOW (ref 27–34)
MCHC RBC-ENTMCNC: 32.9 GM/DL — SIGNIFICANT CHANGE UP (ref 32–36)
MCV RBC AUTO: 80.9 FL — SIGNIFICANT CHANGE UP (ref 80–100)
NRBC # BLD: 0 /100 WBCS — SIGNIFICANT CHANGE UP (ref 0–0)
PLATELET # BLD AUTO: 360 K/UL — SIGNIFICANT CHANGE UP (ref 150–400)
POTASSIUM SERPL-MCNC: 3.3 MMOL/L — LOW (ref 3.5–5.3)
POTASSIUM SERPL-SCNC: 3.3 MMOL/L — LOW (ref 3.5–5.3)
RBC # BLD: 3.46 M/UL — LOW (ref 3.8–5.2)
RBC # FLD: 15.7 % — HIGH (ref 10.3–14.5)
SODIUM SERPL-SCNC: 138 MMOL/L — SIGNIFICANT CHANGE UP (ref 135–145)
WBC # BLD: 10.79 K/UL — HIGH (ref 3.8–10.5)
WBC # FLD AUTO: 10.79 K/UL — HIGH (ref 3.8–10.5)

## 2018-03-19 PROCEDURE — 93010 ELECTROCARDIOGRAM REPORT: CPT

## 2018-03-19 PROCEDURE — 99233 SBSQ HOSP IP/OBS HIGH 50: CPT | Mod: GC

## 2018-03-19 PROCEDURE — 99232 SBSQ HOSP IP/OBS MODERATE 35: CPT

## 2018-03-19 RX ORDER — POTASSIUM CHLORIDE 20 MEQ
10 PACKET (EA) ORAL
Qty: 0 | Refills: 0 | Status: COMPLETED | OUTPATIENT
Start: 2018-03-19 | End: 2018-03-19

## 2018-03-19 RX ORDER — METHADONE HYDROCHLORIDE 40 MG/1
10 TABLET ORAL THREE TIMES A DAY
Qty: 0 | Refills: 0 | Status: DISCONTINUED | OUTPATIENT
Start: 2018-03-19 | End: 2018-03-19

## 2018-03-19 RX ORDER — METHADONE HYDROCHLORIDE 40 MG/1
5 TABLET ORAL EVERY 8 HOURS
Qty: 0 | Refills: 0 | Status: DISCONTINUED | OUTPATIENT
Start: 2018-03-19 | End: 2018-03-23

## 2018-03-19 RX ORDER — POTASSIUM CHLORIDE 20 MEQ
40 PACKET (EA) ORAL ONCE
Qty: 0 | Refills: 0 | Status: DISCONTINUED | OUTPATIENT
Start: 2018-03-19 | End: 2018-03-19

## 2018-03-19 RX ADMIN — FAMOTIDINE 20 MILLIGRAM(S): 10 INJECTION INTRAVENOUS at 10:02

## 2018-03-19 RX ADMIN — Medication 100 MILLIEQUIVALENT(S): at 16:54

## 2018-03-19 RX ADMIN — CLOPIDOGREL BISULFATE 75 MILLIGRAM(S): 75 TABLET, FILM COATED ORAL at 16:43

## 2018-03-19 RX ADMIN — METHADONE HYDROCHLORIDE 5 MILLIGRAM(S): 40 TABLET ORAL at 16:53

## 2018-03-19 RX ADMIN — QUETIAPINE FUMARATE 100 MILLIGRAM(S): 200 TABLET, FILM COATED ORAL at 22:06

## 2018-03-19 RX ADMIN — Medication 10 MILLIGRAM(S): at 12:04

## 2018-03-19 RX ADMIN — DIPHENHYDRAMINE HYDROCHLORIDE AND LIDOCAINE HYDROCHLORIDE AND ALUMINUM HYDROXIDE AND MAGNESIUM HYDRO 5 MILLILITER(S): KIT at 16:19

## 2018-03-19 RX ADMIN — Medication 100 MILLIEQUIVALENT(S): at 19:47

## 2018-03-19 RX ADMIN — MORPHINE SULFATE 2 MILLIGRAM(S): 50 CAPSULE, EXTENDED RELEASE ORAL at 16:44

## 2018-03-19 RX ADMIN — Medication 100 MILLIEQUIVALENT(S): at 13:58

## 2018-03-19 RX ADMIN — Medication 1 MILLIGRAM(S): at 22:07

## 2018-03-19 RX ADMIN — SERTRALINE 100 MILLIGRAM(S): 25 TABLET, FILM COATED ORAL at 22:06

## 2018-03-19 RX ADMIN — METHADONE HYDROCHLORIDE 5 MILLIGRAM(S): 40 TABLET ORAL at 22:46

## 2018-03-19 RX ADMIN — ERTAPENEM SODIUM 120 MILLIGRAM(S): 1 INJECTION, POWDER, LYOPHILIZED, FOR SOLUTION INTRAMUSCULAR; INTRAVENOUS at 18:48

## 2018-03-19 RX ADMIN — MORPHINE SULFATE 2 MILLIGRAM(S): 50 CAPSULE, EXTENDED RELEASE ORAL at 06:45

## 2018-03-19 RX ADMIN — MORPHINE SULFATE 2 MILLIGRAM(S): 50 CAPSULE, EXTENDED RELEASE ORAL at 06:23

## 2018-03-19 RX ADMIN — DIPHENHYDRAMINE HYDROCHLORIDE AND LIDOCAINE HYDROCHLORIDE AND ALUMINUM HYDROXIDE AND MAGNESIUM HYDRO 5 MILLILITER(S): KIT at 10:03

## 2018-03-19 RX ADMIN — FLUCONAZOLE 50 MILLIGRAM(S): 150 TABLET ORAL at 16:14

## 2018-03-19 RX ADMIN — OLANZAPINE 2.5 MILLIGRAM(S): 15 TABLET, FILM COATED ORAL at 22:46

## 2018-03-19 RX ADMIN — MORPHINE SULFATE 2 MILLIGRAM(S): 50 CAPSULE, EXTENDED RELEASE ORAL at 17:00

## 2018-03-19 RX ADMIN — QUETIAPINE FUMARATE 100 MILLIGRAM(S): 200 TABLET, FILM COATED ORAL at 12:02

## 2018-03-19 RX ADMIN — Medication 3 MILLILITER(S): at 23:19

## 2018-03-19 RX ADMIN — ENOXAPARIN SODIUM 30 MILLIGRAM(S): 100 INJECTION SUBCUTANEOUS at 06:26

## 2018-03-19 NOTE — DISCHARGE NOTE ADULT - MEDICATION SUMMARY - MEDICATIONS TO STOP TAKING
I will STOP taking the medications listed below when I get home from the hospital:    methadone 10 mg oral tablet  -- 1 tab(s) by mouth 3 times a day (before meals)    QUEtiapine 100 mg oral tablet  -- 1 tab(s) by mouth 3 times a day    OLANZapine 2.5 mg oral tablet  -- 1 tab(s) by mouth once a day (at bedtime)   -- It is very important that you take or use this exactly as directed.  Do not skip doses or discontinue unless directed by your doctor.  May cause drowsiness.  Alcohol may intensify this effect.  Use care when operating dangerous machinery.  Obtain medical advice before taking any non-prescription drugs as some may affect the action of this medication.

## 2018-03-19 NOTE — DISCHARGE NOTE ADULT - PATIENT PORTAL LINK FT
You can access the [facility] Patient Portal, offered by [facility], by registering with the following website: [email or web address]

## 2018-03-19 NOTE — DISCHARGE NOTE ADULT - CARE PLAN
Principal Discharge DX:	PNA (pneumonia)  Goal:	Remain free of symptoms  Secondary Diagnosis:	Urinary retention Principal Discharge DX:	PNA (pneumonia)  Goal:	Remain free of symptoms  Secondary Diagnosis:	Urinary retention  Assessment and plan of treatment:	Follow up with urology Dr Rojas [phone] Principal Discharge DX:	PNA (pneumonia)  Goal:	Remain free of symptoms  Assessment and plan of treatment:	Follow up with your primary care provider  Follow up with home care  Secondary Diagnosis:	Urinary retention  Assessment and plan of treatment:	Follow up with urology Dr Rojas [phone]  Secondary Diagnosis:	Early onset Alzheimer's disease with behavioral disturbance  Assessment and plan of treatment:	Agitation related to dementia   Will have to discontinue use of zyprexa, seroquel and methadone due to increased QTC  can give extra dose of klonopin 0.5 mg prn, not to exceed a total of 2 mg in any 24 hour period  Follow up with your primary care provider as to when they can be resumed

## 2018-03-19 NOTE — DISCHARGE NOTE ADULT - HOSPITAL COURSE
Pt admited with Pneumonia due to infectious organism, SIRS/sepsis present on admission seen by id tx with course of IV abx, now with improvement. Also with urinary retention, gavin placed, will need to follow up with urology Pt admited with Pneumonia due to infectious organism, SIRS/sepsis present on admission seen by id tx with course of IV abx, now with improvement. Also with urinary retention, gavin placed, will need to follow up with urology. proloned QTC so zyprexa, seroquel, methadone. Stable for d/c with home care follow up

## 2018-03-19 NOTE — PROGRESS NOTE ADULT - ASSESSMENT
: Pneumonia due to infectious organism   pt with significant PCN allergy,   seen by id   abs as per id   f/u cultures  pulm f/u noted  id  f/u noted      ·  Problem: Cerebrovascular accident (CVA), unspecified mechanism.  Plan: cont plavix. i      ·  Problem: Gastroesophageal reflux disease, esophagitis presence not specified.  P  cont pepcid.       ·  Problem: Alzheimer's disease with behavioral disturbance   pt with advanced disease   is HCP and he and daughters all agree to maximize quality of life and avoid patient suffering  dnr/dni i  palliative eval noted  hospice outpt referral       ·  Problem: Hyperlipidemia,      Problem: Asthma, unspecified asthma severity, unspecified whether complicated, unspecified whether persistent. Plan:  states pt with difficulty taking inhalers and zyflo  on chronic prednisone for asthma,   will give duoneb q6hr for now and monitor        ·  Problem: Chronic low back pain, unspecified back pain laterality, with sciatica presence unspecified.   cont meds prn    pt seen by psych   meds adjusted as per psych  discussed at length with    resume pleasure feeds   aware of risks  iv fluids  supplement potassium for low potassium level    prognosis overall poor

## 2018-03-19 NOTE — DISCHARGE NOTE ADULT - PLAN OF CARE
Remain free of symptoms Follow up with urology Dr Rojas [phone] Follow up with your primary care provider  Follow up with home care Agitation related to dementia   Will have to discontinue use of zyprexa, seroquel and methadone due to increased QTC  can give extra dose of klonopin 0.5 mg prn, not to exceed a total of 2 mg in any 24 hour period  Follow up with your primary care provider as to when they can be resumed

## 2018-03-19 NOTE — PROGRESS NOTE ADULT - ASSESSMENT
67 f with advanced and early onset alzheimers disease, h/o CVA, asthma, anxiety, chronic LBP requiring methadone, hld, GERD, just discharged 3/9 for back pain now p/w worsening cough and poor PO intake for the last few days.  Pt has been having worsening dysphagia and had one episode of vomiting. Found to have fever to 101F, leukocytosis 14 and new opacities multifocal on CXR. h/o rash to sulfa and penicillins  RVP negative h/o rash to penicillin and sulfa    #Aspiration pneumonia  Improving on Ertapenem  Would plan to treat with five days of antibiotics    Please call if issues arise    Ever Hadley MD  [phone]  After 5pm/weekends [phone]

## 2018-03-19 NOTE — DISCHARGE NOTE ADULT - MEDICATION SUMMARY - MEDICATIONS TO TAKE
I will START or STAY ON the medications listed below when I get home from the hospital:    predniSONE 10 mg oral tablet  -- 1 tab(s) by mouth once a day  -- Indication: For Steroid    KlonoPIN 0.5 mg oral tablet  -- 1 tab(s) by mouth once a day in the morning  -- Indication: For Agitation     KlonoPIN 0.5 mg oral tablet  -- 2 tab(s) by mouth once a day at night  -- Indication: For Agitation     clopidogrel 75 mg oral tablet  -- 1 tab(s) by mouth once a day  -- Indication: For CAD    Spiriva 18 mcg inhalation capsule  -- 1 cap(s) inhaled once a day  -- Indication: For Asthma, unspecified asthma severity, unspecified whether complicated, unspecified whether persistent    Advair Diskus 500 mcg-50 mcg inhalation powder  -- 2 puff(s) inhaled 2 times a day  -- Indication: For Asthma, unspecified asthma severity, unspecified whether complicated, unspecified whether persistent    famotidine 20 mg oral tablet  -- 1 tab(s) by mouth once a day  -- Indication: For GERD    bisacodyl 10 mg rectal suppository  -- 1 suppository(ies) rectally once a day, As needed, Constipation  -- Indication: For Constipation

## 2018-03-19 NOTE — DISCHARGE NOTE ADULT - CARE PROVIDER_API CALL
Kasey Rojas), Urology  [address] [place] [address]  Phone: [phone]  Fax: [phone]

## 2018-03-19 NOTE — PROGRESS NOTE ADULT - PROBLEM SELECTOR PLAN 5
family excited about her improvement  will follow closely  if pt declines will refer to hospice but if remains stable home with home care  pt is dnr/dni

## 2018-03-19 NOTE — DISCHARGE NOTE ADULT - NS AS DC FOLLOWUP STROKE INST
Smoking Cessation/Influenza vaccination (VIS Pub Date: August 7, 2015)/Stroke (includes: TIA/SAH/ICH/Ischemic Stroke)/Pneumonia Pneumonia/Influenza vaccination (VIS Pub Date: August 7, 2015)/Smoking Cessation Pneumonia

## 2018-03-20 LAB
ANION GAP SERPL CALC-SCNC: 20 MMOL/L — HIGH (ref 5–17)
BUN SERPL-MCNC: 19 MG/DL — SIGNIFICANT CHANGE UP (ref 7–23)
CALCIUM SERPL-MCNC: 8.8 MG/DL — SIGNIFICANT CHANGE UP (ref 8.4–10.5)
CHLORIDE SERPL-SCNC: 100 MMOL/L — SIGNIFICANT CHANGE UP (ref 96–108)
CO2 SERPL-SCNC: 17 MMOL/L — LOW (ref 22–31)
CREAT SERPL-MCNC: 0.63 MG/DL — SIGNIFICANT CHANGE UP (ref 0.5–1.3)
GLUCOSE SERPL-MCNC: 67 MG/DL — LOW (ref 70–99)
HCT VFR BLD CALC: 30.6 % — LOW (ref 34.5–45)
HGB BLD-MCNC: 9.5 G/DL — LOW (ref 11.5–15.5)
MCHC RBC-ENTMCNC: 26.5 PG — LOW (ref 27–34)
MCHC RBC-ENTMCNC: 31 GM/DL — LOW (ref 32–36)
MCV RBC AUTO: 85.5 FL — SIGNIFICANT CHANGE UP (ref 80–100)
NRBC # BLD: 0 /100 WBCS — SIGNIFICANT CHANGE UP (ref 0–0)
PLATELET # BLD AUTO: 420 K/UL — HIGH (ref 150–400)
POTASSIUM SERPL-MCNC: 4.8 MMOL/L — SIGNIFICANT CHANGE UP (ref 3.5–5.3)
POTASSIUM SERPL-SCNC: 4.8 MMOL/L — SIGNIFICANT CHANGE UP (ref 3.5–5.3)
RBC # BLD: 3.58 M/UL — LOW (ref 3.8–5.2)
RBC # FLD: 15.5 % — HIGH (ref 10.3–14.5)
SODIUM SERPL-SCNC: 137 MMOL/L — SIGNIFICANT CHANGE UP (ref 135–145)
WBC # BLD: 12.09 K/UL — HIGH (ref 3.8–10.5)
WBC # FLD AUTO: 12.09 K/UL — HIGH (ref 3.8–10.5)

## 2018-03-20 PROCEDURE — 99233 SBSQ HOSP IP/OBS HIGH 50: CPT | Mod: GC

## 2018-03-20 RX ORDER — SODIUM CHLORIDE 9 MG/ML
1000 INJECTION, SOLUTION INTRAVENOUS
Qty: 0 | Refills: 0 | Status: DISCONTINUED | OUTPATIENT
Start: 2018-03-20 | End: 2018-03-23

## 2018-03-20 RX ADMIN — Medication 10 MILLIGRAM(S): at 15:15

## 2018-03-20 RX ADMIN — Medication 1 MILLIGRAM(S): at 22:21

## 2018-03-20 RX ADMIN — FAMOTIDINE 20 MILLIGRAM(S): 10 INJECTION INTRAVENOUS at 15:14

## 2018-03-20 RX ADMIN — DIPHENHYDRAMINE HYDROCHLORIDE AND LIDOCAINE HYDROCHLORIDE AND ALUMINUM HYDROXIDE AND MAGNESIUM HYDRO 5 MILLILITER(S): KIT at 18:48

## 2018-03-20 RX ADMIN — ERTAPENEM SODIUM 120 MILLIGRAM(S): 1 INJECTION, POWDER, LYOPHILIZED, FOR SOLUTION INTRAMUSCULAR; INTRAVENOUS at 18:48

## 2018-03-20 RX ADMIN — Medication 10 MILLIGRAM(S): at 10:39

## 2018-03-20 RX ADMIN — METHADONE HYDROCHLORIDE 5 MILLIGRAM(S): 40 TABLET ORAL at 22:21

## 2018-03-20 RX ADMIN — METHADONE HYDROCHLORIDE 5 MILLIGRAM(S): 40 TABLET ORAL at 10:23

## 2018-03-20 RX ADMIN — FLUCONAZOLE 50 MILLIGRAM(S): 150 TABLET ORAL at 12:14

## 2018-03-20 RX ADMIN — ENOXAPARIN SODIUM 30 MILLIGRAM(S): 100 INJECTION SUBCUTANEOUS at 10:40

## 2018-03-20 RX ADMIN — DIPHENHYDRAMINE HYDROCHLORIDE AND LIDOCAINE HYDROCHLORIDE AND ALUMINUM HYDROXIDE AND MAGNESIUM HYDRO 5 MILLILITER(S): KIT at 10:23

## 2018-03-20 RX ADMIN — QUETIAPINE FUMARATE 100 MILLIGRAM(S): 200 TABLET, FILM COATED ORAL at 22:21

## 2018-03-20 RX ADMIN — QUETIAPINE FUMARATE 100 MILLIGRAM(S): 200 TABLET, FILM COATED ORAL at 15:31

## 2018-03-20 NOTE — PROGRESS NOTE ADULT - PROBLEM SELECTOR PLAN 1
supportive care  psych eval noted- restarted on her medications  consider decreasing doses and then readjust as necessary

## 2018-03-20 NOTE — PROGRESS NOTE ADULT - ASSESSMENT
: Pneumonia due to infectious organism   pt with significant PCN allergy,   seen by id   abs as per id   f/u cultures  pulm f/u noted  id  f/u noted      ·  Problem: Cerebrovascular accident (CVA), unspecified mechanism.  Plan: cont plavix. i      ·  Problem: Gastroesophageal reflux disease, esophagitis presence not specified.  P  cont pepcid.       ·  Problem: Alzheimer's disease with behavioral disturbance   pt with advanced disease   is HCP and he and daughters all agree to maximize quality of life and avoid patient suffering  dnr/dni i  palliative eval noted  hospice outpt referral       ·  Problem: Hyperlipidemia,      Problem: Asthma, unspecified asthma severity, unspecified whether complicated, unspecified whether persistent. Plan:  states pt with difficulty taking inhalers and zyflo  on chronic prednisone for asthma,   will give duoneb q6hr for now and monitor        ·  Problem: Chronic low back pain, unspecified back pain laterality, with sciatica presence unspecified.   cont meds prn    pt seen by psych   meds adjusted as per psych  discussed at length with    c/w pleasure feeds   aware of risks  iv fluids    prognosis overall poor  palliative appreciated

## 2018-03-20 NOTE — PROGRESS NOTE ADULT - ASSESSMENT
Dementia  Delirium from pneumonia  Flat affect could be from neuroleptics/polypharmacy.    Recommend  Hold individual doses of Olanzapine and Seroquel if sedated. Avoid d/c of Klonopin as pt. may undergo withdrawal.   Follow QTc.      Irvin Reyes M.D.  Psychiatry  [phone]

## 2018-03-21 LAB
ANION GAP SERPL CALC-SCNC: 17 MMOL/L — SIGNIFICANT CHANGE UP (ref 5–17)
APPEARANCE UR: CLEAR — SIGNIFICANT CHANGE UP
BILIRUB UR-MCNC: NEGATIVE — SIGNIFICANT CHANGE UP
BUN SERPL-MCNC: 27 MG/DL — HIGH (ref 7–23)
CALCIUM SERPL-MCNC: 8.8 MG/DL — SIGNIFICANT CHANGE UP (ref 8.4–10.5)
CHLORIDE SERPL-SCNC: 104 MMOL/L — SIGNIFICANT CHANGE UP (ref 96–108)
CO2 SERPL-SCNC: 17 MMOL/L — LOW (ref 22–31)
COLOR SPEC: YELLOW — SIGNIFICANT CHANGE UP
CREAT SERPL-MCNC: 0.96 MG/DL — SIGNIFICANT CHANGE UP (ref 0.5–1.3)
CULTURE RESULTS: SIGNIFICANT CHANGE UP
CULTURE RESULTS: SIGNIFICANT CHANGE UP
DIFF PNL FLD: NEGATIVE — SIGNIFICANT CHANGE UP
GLUCOSE SERPL-MCNC: 148 MG/DL — HIGH (ref 70–99)
GLUCOSE UR QL: NEGATIVE MG/DL — SIGNIFICANT CHANGE UP
HCT VFR BLD CALC: 30.7 % — LOW (ref 34.5–45)
HGB BLD-MCNC: 9.5 G/DL — LOW (ref 11.5–15.5)
KETONES UR-MCNC: NEGATIVE — SIGNIFICANT CHANGE UP
LEUKOCYTE ESTERASE UR-ACNC: NEGATIVE — SIGNIFICANT CHANGE UP
MCHC RBC-ENTMCNC: 25.6 PG — LOW (ref 27–34)
MCHC RBC-ENTMCNC: 30.9 GM/DL — LOW (ref 32–36)
MCV RBC AUTO: 82.7 FL — SIGNIFICANT CHANGE UP (ref 80–100)
NITRITE UR-MCNC: NEGATIVE — SIGNIFICANT CHANGE UP
NRBC # BLD: 0 /100 WBCS — SIGNIFICANT CHANGE UP (ref 0–0)
PH UR: 5.5 — SIGNIFICANT CHANGE UP (ref 5–8)
PLATELET # BLD AUTO: 491 K/UL — HIGH (ref 150–400)
POTASSIUM SERPL-MCNC: 4.3 MMOL/L — SIGNIFICANT CHANGE UP (ref 3.5–5.3)
POTASSIUM SERPL-SCNC: 4.3 MMOL/L — SIGNIFICANT CHANGE UP (ref 3.5–5.3)
PROT UR-MCNC: NEGATIVE MG/DL — SIGNIFICANT CHANGE UP
RBC # BLD: 3.71 M/UL — LOW (ref 3.8–5.2)
RBC # FLD: 15.9 % — HIGH (ref 10.3–14.5)
SODIUM SERPL-SCNC: 138 MMOL/L — SIGNIFICANT CHANGE UP (ref 135–145)
SP GR SPEC: 1.02 — SIGNIFICANT CHANGE UP (ref 1.01–1.02)
SPECIMEN SOURCE: SIGNIFICANT CHANGE UP
SPECIMEN SOURCE: SIGNIFICANT CHANGE UP
UROBILINOGEN FLD QL: 1 MG/DL — SIGNIFICANT CHANGE UP
WBC # BLD: 11.77 K/UL — HIGH (ref 3.8–10.5)
WBC # FLD AUTO: 11.77 K/UL — HIGH (ref 3.8–10.5)

## 2018-03-21 RX ORDER — FLUCONAZOLE 150 MG/1
100 TABLET ORAL EVERY 24 HOURS
Qty: 0 | Refills: 0 | Status: DISCONTINUED | OUTPATIENT
Start: 2018-03-21 | End: 2018-03-23

## 2018-03-21 RX ADMIN — CLOPIDOGREL BISULFATE 75 MILLIGRAM(S): 75 TABLET, FILM COATED ORAL at 12:05

## 2018-03-21 RX ADMIN — ENOXAPARIN SODIUM 30 MILLIGRAM(S): 100 INJECTION SUBCUTANEOUS at 12:05

## 2018-03-21 RX ADMIN — METHADONE HYDROCHLORIDE 5 MILLIGRAM(S): 40 TABLET ORAL at 12:05

## 2018-03-21 RX ADMIN — FLUCONAZOLE 100 MILLIGRAM(S): 150 TABLET ORAL at 12:05

## 2018-03-21 RX ADMIN — Medication 10 MILLIGRAM(S): at 12:05

## 2018-03-21 RX ADMIN — FAMOTIDINE 20 MILLIGRAM(S): 10 INJECTION INTRAVENOUS at 12:04

## 2018-03-21 RX ADMIN — ERTAPENEM SODIUM 120 MILLIGRAM(S): 1 INJECTION, POWDER, LYOPHILIZED, FOR SOLUTION INTRAMUSCULAR; INTRAVENOUS at 17:35

## 2018-03-21 NOTE — PROCEDURE NOTE - NSURITECHNIQUE_GU_A_CORE
Proper hand hygiene was performed/The catheter was appropriately lubricated/The catheter was secured with a securement device (e.g. StatLock)/Sterile gloves were worn for the duration of the procedure/A sterile drape was used to cover all adjacent areas/The site was cleaned with soap/water and sterile solution (betadine)/The collection bag is below the level of the patient and urinary bladder

## 2018-03-21 NOTE — PROGRESS NOTE ADULT - ASSESSMENT
: Pneumonia due to infectious organism, SIRS/sepsis present on admission   pt with significant PCN allergy,   seen by id   abs as per id   f/u cultures  pulm f/u noted  id  f/u noted      ·  Problem: Cerebrovascular accident (CVA), unspecified mechanism.  Plan: cont plavix. i      ·  Problem: Gastroesophageal reflux disease, esophagitis presence not specified.  P  cont pepcid.       ·  Problem: Alzheimer's disease with behavioral disturbance   pt with advanced disease   is HCP and he and daughters all agree to maximize quality of life and avoid patient suffering  dnr/dni i  palliative eval noted  hospice outpt referral       ·  Problem: Hyperlipidemia,      Problem: Asthma, unspecified asthma severity, unspecified whether complicated, unspecified whether persistent. Plan:  states pt with difficulty taking inhalers and zyflo  on chronic prednisone for asthma,   will give duoneb q6hr for now and monitor        ·  Problem: Chronic low back pain, unspecified back pain laterality, with sciatica presence unspecified.   cont meds prn    pt seen by psych   meds adjusted as per psych  discussed at length with    c/w pleasure feeds   aware of risks  iv fluids    prognosis overall poor  palliative appreciated

## 2018-03-22 LAB
ANION GAP SERPL CALC-SCNC: 12 MMOL/L — SIGNIFICANT CHANGE UP (ref 5–17)
BASOPHILS # BLD AUTO: 0.01 K/UL — SIGNIFICANT CHANGE UP (ref 0–0.2)
BASOPHILS NFR BLD AUTO: 0.1 % — SIGNIFICANT CHANGE UP (ref 0–2)
BUN SERPL-MCNC: 27 MG/DL — HIGH (ref 7–23)
CALCIUM SERPL-MCNC: 8.1 MG/DL — LOW (ref 8.4–10.5)
CHLORIDE SERPL-SCNC: 106 MMOL/L — SIGNIFICANT CHANGE UP (ref 96–108)
CO2 SERPL-SCNC: 22 MMOL/L — SIGNIFICANT CHANGE UP (ref 22–31)
CREAT SERPL-MCNC: 0.74 MG/DL — SIGNIFICANT CHANGE UP (ref 0.5–1.3)
EOSINOPHIL # BLD AUTO: 0.04 K/UL — SIGNIFICANT CHANGE UP (ref 0–0.5)
EOSINOPHIL NFR BLD AUTO: 0.6 % — SIGNIFICANT CHANGE UP (ref 0–6)
GLUCOSE SERPL-MCNC: 140 MG/DL — HIGH (ref 70–99)
HCT VFR BLD CALC: 26.5 % — LOW (ref 34.5–45)
HGB BLD-MCNC: 8.1 G/DL — LOW (ref 11.5–15.5)
IMM GRANULOCYTES NFR BLD AUTO: 0.7 % — SIGNIFICANT CHANGE UP (ref 0–1.5)
LYMPHOCYTES # BLD AUTO: 0.59 K/UL — LOW (ref 1–3.3)
LYMPHOCYTES # BLD AUTO: 8.5 % — LOW (ref 13–44)
MCHC RBC-ENTMCNC: 26.5 PG — LOW (ref 27–34)
MCHC RBC-ENTMCNC: 30.6 GM/DL — LOW (ref 32–36)
MCV RBC AUTO: 86.6 FL — SIGNIFICANT CHANGE UP (ref 80–100)
MONOCYTES # BLD AUTO: 0.7 K/UL — SIGNIFICANT CHANGE UP (ref 0–0.9)
MONOCYTES NFR BLD AUTO: 10.1 % — SIGNIFICANT CHANGE UP (ref 2–14)
NEUTROPHILS # BLD AUTO: 5.56 K/UL — SIGNIFICANT CHANGE UP (ref 1.8–7.4)
NEUTROPHILS NFR BLD AUTO: 80 % — HIGH (ref 43–77)
PLATELET # BLD AUTO: 411 K/UL — HIGH (ref 150–400)
POTASSIUM SERPL-MCNC: 4.1 MMOL/L — SIGNIFICANT CHANGE UP (ref 3.5–5.3)
POTASSIUM SERPL-SCNC: 4.1 MMOL/L — SIGNIFICANT CHANGE UP (ref 3.5–5.3)
RBC # BLD: 3.06 M/UL — LOW (ref 3.8–5.2)
RBC # FLD: 15.8 % — HIGH (ref 10.3–14.5)
SODIUM SERPL-SCNC: 140 MMOL/L — SIGNIFICANT CHANGE UP (ref 135–145)
WBC # BLD: 6.95 K/UL — SIGNIFICANT CHANGE UP (ref 3.8–10.5)
WBC # FLD AUTO: 6.95 K/UL — SIGNIFICANT CHANGE UP (ref 3.8–10.5)

## 2018-03-22 PROCEDURE — 93010 ELECTROCARDIOGRAM REPORT: CPT

## 2018-03-22 RX ORDER — FAMOTIDINE 10 MG/ML
20 INJECTION INTRAVENOUS DAILY
Qty: 0 | Refills: 0 | Status: DISCONTINUED | OUTPATIENT
Start: 2018-03-22 | End: 2018-03-23

## 2018-03-22 RX ADMIN — Medication 3 MILLILITER(S): at 06:27

## 2018-03-22 RX ADMIN — ENOXAPARIN SODIUM 30 MILLIGRAM(S): 100 INJECTION SUBCUTANEOUS at 06:32

## 2018-03-22 RX ADMIN — DIPHENHYDRAMINE HYDROCHLORIDE AND LIDOCAINE HYDROCHLORIDE AND ALUMINUM HYDROXIDE AND MAGNESIUM HYDRO 5 MILLILITER(S): KIT at 06:27

## 2018-03-22 RX ADMIN — FLUCONAZOLE 100 MILLIGRAM(S): 150 TABLET ORAL at 12:17

## 2018-03-22 RX ADMIN — METHADONE HYDROCHLORIDE 5 MILLIGRAM(S): 40 TABLET ORAL at 12:18

## 2018-03-22 RX ADMIN — Medication 1 MILLIGRAM(S): at 22:26

## 2018-03-22 RX ADMIN — Medication 3 MILLILITER(S): at 12:17

## 2018-03-22 RX ADMIN — SODIUM CHLORIDE 50 MILLILITER(S): 9 INJECTION, SOLUTION INTRAVENOUS at 06:33

## 2018-03-22 RX ADMIN — CLOPIDOGREL BISULFATE 75 MILLIGRAM(S): 75 TABLET, FILM COATED ORAL at 12:18

## 2018-03-22 RX ADMIN — Medication 0.5 MILLIGRAM(S): at 10:01

## 2018-03-22 RX ADMIN — FAMOTIDINE 20 MILLIGRAM(S): 10 INJECTION INTRAVENOUS at 12:18

## 2018-03-22 RX ADMIN — Medication 10 MILLIGRAM(S): at 12:18

## 2018-03-22 NOTE — DIETITIAN INITIAL EVALUATION ADULT. - FLUIDS
Sal; will cont with POC.       [phone]

## 2018-03-22 NOTE — DIETITIAN INITIAL EVALUATION ADULT. - ENERGY NEEDS
Height: 57 inches, Weight: 78 pounds  BMI: 16.9 kg/m2 IBW: 94 pounds (+/-10%), %IBW: 83%  Pertinent Info: Per chart, 66 y/o female with early onset advanced Alzheimer's dementia, CVA, recently admitted and discharged for back pain (3/9) admitted with PNA, oropharyngeal dysphagia 2/2 thrush. Per chart, s/p GOC discussion with palliative and goal is for comfort care only with no artificial nutrition, pending hospice evaluation. No edema, stage 2 right buttock and sacrum pressure ulcers noted at this time.

## 2018-03-22 NOTE — DIETITIAN INITIAL EVALUATION ADULT. - OTHER INFO
Nutrition consult received for assessment and education. Per previous RD note, pt wt was documented as 83 pounds (9/4/2017) current wt is 78 pounds. Pt admitted with worsening dysphagia, on pleasure feed per family request and no plan for enteral feeding. Pt remains with poor po intakes in-house. No acute GI distress noted, +BM 3/18. NKFA per previous RD note.

## 2018-03-22 NOTE — CHART NOTE - NSCHARTNOTEFT_GEN_A_CORE
As per Dr. Deleon, patient is anticipated to be discharged home with homecare and will be discharged from the palliative care consult service at this time.

## 2018-03-22 NOTE — PROGRESS NOTE ADULT - ASSESSMENT
Dementia with delirium  Cannot rx Seroquel, Zyprexa, Methadone until QTc goes below 500ms.  Nutrition consult  Sally Reyes M.D.  Psychiatry  [phone]

## 2018-03-22 NOTE — CHART NOTE - NSCHARTNOTEFT_GEN_A_CORE
Trial of void planned. Daughters at bedside want to keep Blackman in for now since pt is agitated and it was difficult insertion ( placed by Urology ). family educated about infection related with urinary catheter.  Will keep Blackman in now. To be discussed with attending in [place] NP-C  [phone]

## 2018-03-22 NOTE — PROGRESS NOTE ADULT - ASSESSMENT
: Pneumonia due to infectious organism, SIRS/sepsis present on admission   pt with significant PCN allergy,   seen by id   abs as per id , last day today  f/u cultures  pulm f/u noted  id  f/u noted      ·  Problem: Cerebrovascular accident (CVA), unspecified mechanism.  Plan: cont plavix. i      ·  Problem: Gastroesophageal reflux disease, esophagitis presence not specified.  P  cont pepcid.       ·  Problem: Alzheimer's disease with behavioral disturbance   pt with advanced disease   is HCP and he and daughters all agree to maximize quality of life and avoid patient suffering  dnr/dni i  palliative f/u      ·  Problem: Hyperlipidemia,      Problem: Asthma, unspecified asthma severity, unspecified whether complicated, unspecified whether persistent. Plan:  states pt with difficulty taking inhalers and zyflo  on chronic prednisone for asthma,   will give duoneb q6hr for now and monitor        ·  Problem: Chronic low back pain, unspecified back pain laterality, with sciatica presence unspecified.   cont meds prn    pt seen by psych   meds adjusted as per psych  now with prolonged QTc, will hold psych meds except klonopin and await psych f/u    c/w pleasure feeds   aware of risks  iv fluids while inpt    prognosis overall poor  d/c planning

## 2018-03-22 NOTE — DIETITIAN INITIAL EVALUATION ADULT. - NS AS NUTRI INTERV MEALS SNACK
Pt on comfort care with pleasure feed, no plans for enteral nutrition. Will add Health Shakes 3 x day and Magic Cup as nutrient dense snacks. RD remains available.

## 2018-03-23 VITALS — OXYGEN SATURATION: 94 % | DIASTOLIC BLOOD PRESSURE: 81 MMHG | SYSTOLIC BLOOD PRESSURE: 145 MMHG | HEART RATE: 100 BPM

## 2018-03-23 LAB
BASOPHILS # BLD AUTO: 0 K/UL — SIGNIFICANT CHANGE UP (ref 0–0.2)
BASOPHILS NFR BLD AUTO: 0 % — SIGNIFICANT CHANGE UP (ref 0–2)
CULTURE RESULTS: SIGNIFICANT CHANGE UP
CULTURE RESULTS: SIGNIFICANT CHANGE UP
EOSINOPHIL # BLD AUTO: 0.03 K/UL — SIGNIFICANT CHANGE UP (ref 0–0.5)
EOSINOPHIL NFR BLD AUTO: 0.5 % — SIGNIFICANT CHANGE UP (ref 0–6)
HCT VFR BLD CALC: 24.8 % — LOW (ref 34.5–45)
HGB BLD-MCNC: 8.3 G/DL — LOW (ref 11.5–15.5)
IMM GRANULOCYTES NFR BLD AUTO: 0.2 % — SIGNIFICANT CHANGE UP (ref 0–1.5)
LYMPHOCYTES # BLD AUTO: 0.84 K/UL — LOW (ref 1–3.3)
LYMPHOCYTES # BLD AUTO: 14.1 % — SIGNIFICANT CHANGE UP (ref 13–44)
MCHC RBC-ENTMCNC: 27 PG — SIGNIFICANT CHANGE UP (ref 27–34)
MCHC RBC-ENTMCNC: 33.5 GM/DL — SIGNIFICANT CHANGE UP (ref 32–36)
MCV RBC AUTO: 80.8 FL — SIGNIFICANT CHANGE UP (ref 80–100)
MONOCYTES # BLD AUTO: 0.73 K/UL — SIGNIFICANT CHANGE UP (ref 0–0.9)
MONOCYTES NFR BLD AUTO: 12.3 % — SIGNIFICANT CHANGE UP (ref 2–14)
NEUTROPHILS # BLD AUTO: 4.34 K/UL — SIGNIFICANT CHANGE UP (ref 1.8–7.4)
NEUTROPHILS NFR BLD AUTO: 72.9 % — SIGNIFICANT CHANGE UP (ref 43–77)
PLATELET # BLD AUTO: 402 K/UL — HIGH (ref 150–400)
RBC # BLD: 3.07 M/UL — LOW (ref 3.8–5.2)
RBC # FLD: 15.6 % — HIGH (ref 10.3–14.5)
SPECIMEN SOURCE: SIGNIFICANT CHANGE UP
SPECIMEN SOURCE: SIGNIFICANT CHANGE UP
WBC # BLD: 5.95 K/UL — SIGNIFICANT CHANGE UP (ref 3.8–10.5)
WBC # FLD AUTO: 5.95 K/UL — SIGNIFICANT CHANGE UP (ref 3.8–10.5)

## 2018-03-23 PROCEDURE — 84295 ASSAY OF SERUM SODIUM: CPT

## 2018-03-23 PROCEDURE — 71045 X-RAY EXAM CHEST 1 VIEW: CPT

## 2018-03-23 PROCEDURE — 85014 HEMATOCRIT: CPT

## 2018-03-23 PROCEDURE — 83605 ASSAY OF LACTIC ACID: CPT

## 2018-03-23 PROCEDURE — 82550 ASSAY OF CK (CPK): CPT

## 2018-03-23 PROCEDURE — 81001 URINALYSIS AUTO W/SCOPE: CPT

## 2018-03-23 PROCEDURE — 87040 BLOOD CULTURE FOR BACTERIA: CPT

## 2018-03-23 PROCEDURE — 87486 CHLMYD PNEUM DNA AMP PROBE: CPT

## 2018-03-23 PROCEDURE — 87086 URINE CULTURE/COLONY COUNT: CPT

## 2018-03-23 PROCEDURE — 96374 THER/PROPH/DIAG INJ IV PUSH: CPT

## 2018-03-23 PROCEDURE — 85610 PROTHROMBIN TIME: CPT

## 2018-03-23 PROCEDURE — 80053 COMPREHEN METABOLIC PANEL: CPT

## 2018-03-23 PROCEDURE — 82435 ASSAY OF BLOOD CHLORIDE: CPT

## 2018-03-23 PROCEDURE — 99285 EMERGENCY DEPT VISIT HI MDM: CPT | Mod: 25

## 2018-03-23 PROCEDURE — 80048 BASIC METABOLIC PNL TOTAL CA: CPT

## 2018-03-23 PROCEDURE — 87798 DETECT AGENT NOS DNA AMP: CPT

## 2018-03-23 PROCEDURE — 82947 ASSAY GLUCOSE BLOOD QUANT: CPT

## 2018-03-23 PROCEDURE — 87581 M.PNEUMON DNA AMP PROBE: CPT

## 2018-03-23 PROCEDURE — 85730 THROMBOPLASTIN TIME PARTIAL: CPT

## 2018-03-23 PROCEDURE — 84132 ASSAY OF SERUM POTASSIUM: CPT

## 2018-03-23 PROCEDURE — 93010 ELECTROCARDIOGRAM REPORT: CPT

## 2018-03-23 PROCEDURE — 94640 AIRWAY INHALATION TREATMENT: CPT

## 2018-03-23 PROCEDURE — 81003 URINALYSIS AUTO W/O SCOPE: CPT

## 2018-03-23 PROCEDURE — 84484 ASSAY OF TROPONIN QUANT: CPT

## 2018-03-23 PROCEDURE — 87633 RESP VIRUS 12-25 TARGETS: CPT

## 2018-03-23 PROCEDURE — 96375 TX/PRO/DX INJ NEW DRUG ADDON: CPT

## 2018-03-23 PROCEDURE — 84100 ASSAY OF PHOSPHORUS: CPT

## 2018-03-23 PROCEDURE — 82553 CREATINE MB FRACTION: CPT

## 2018-03-23 PROCEDURE — 82803 BLOOD GASES ANY COMBINATION: CPT

## 2018-03-23 PROCEDURE — 85027 COMPLETE CBC AUTOMATED: CPT

## 2018-03-23 PROCEDURE — 93005 ELECTROCARDIOGRAM TRACING: CPT

## 2018-03-23 PROCEDURE — 83735 ASSAY OF MAGNESIUM: CPT

## 2018-03-23 PROCEDURE — 82330 ASSAY OF CALCIUM: CPT

## 2018-03-23 PROCEDURE — 87449 NOS EACH ORGANISM AG IA: CPT

## 2018-03-23 RX ORDER — LUBIPROSTONE 24 UG/1
1 CAPSULE, GELATIN COATED ORAL
Qty: 0 | Refills: 0 | COMMUNITY

## 2018-03-23 RX ORDER — FAMOTIDINE 10 MG/ML
1 INJECTION INTRAVENOUS
Qty: 0 | Refills: 0 | COMMUNITY

## 2018-03-23 RX ORDER — ONDANSETRON 8 MG/1
1 TABLET, FILM COATED ORAL
Qty: 0 | Refills: 0 | COMMUNITY

## 2018-03-23 RX ORDER — OLANZAPINE 15 MG/1
1 TABLET, FILM COATED ORAL
Qty: 0 | Refills: 0 | COMMUNITY
Start: 2018-03-23

## 2018-03-23 RX ORDER — METHADONE HYDROCHLORIDE 40 MG/1
1 TABLET ORAL
Qty: 0 | Refills: 0 | COMMUNITY
Start: 2018-03-23

## 2018-03-23 RX ORDER — EZETIMIBE 10 MG/1
1 TABLET ORAL
Qty: 0 | Refills: 0 | COMMUNITY

## 2018-03-23 RX ORDER — FAMOTIDINE 10 MG/ML
1 INJECTION INTRAVENOUS
Qty: 0 | Refills: 0 | COMMUNITY
Start: 2018-03-23

## 2018-03-23 RX ADMIN — Medication 10 MILLIGRAM(S): at 11:57

## 2018-03-23 RX ADMIN — CLOPIDOGREL BISULFATE 75 MILLIGRAM(S): 75 TABLET, FILM COATED ORAL at 11:57

## 2018-03-23 RX ADMIN — ENOXAPARIN SODIUM 30 MILLIGRAM(S): 100 INJECTION SUBCUTANEOUS at 06:22

## 2018-03-23 RX ADMIN — FAMOTIDINE 20 MILLIGRAM(S): 10 INJECTION INTRAVENOUS at 11:57

## 2018-03-23 RX ADMIN — FLUCONAZOLE 100 MILLIGRAM(S): 150 TABLET ORAL at 11:57

## 2018-03-23 RX ADMIN — Medication 0.5 MILLIGRAM(S): at 11:57

## 2018-03-23 NOTE — PROCEDURE NOTE - NSFINDINGS_GEN_A_CORE
straw colored/positive return of urine in the collection bag
positive return of urine in the collection bag

## 2018-03-23 NOTE — PROCEDURE NOTE - ADDITIONAL PROCEDURE DETAILS
UROLOGY PROGRESS NOTE:     Called to see patient for difficult gavin placement.  Unsuccessful attempt by primary team/nursing staff.  16F coude gavin placed under typical sterile technique.  No complications.  Patient tolerated procedure well.  1700cc clear yellow urine drained.  Please call urology before removing gavin

## 2018-03-23 NOTE — PROGRESS NOTE ADULT - PROVIDER SPECIALTY LIST ADULT
Internal Medicine
Neurology
Palliative Care
Palliative Care
Psychiatry
Pulmonology
Urology
Pulmonology
Infectious Disease
Internal Medicine

## 2018-03-23 NOTE — PROGRESS NOTE ADULT - ASSESSMENT
Dementia with delirium  Rule out urinary retention from recent Zyprexa and Seroquel use.     Recommend  Discussed with  the reasons for witholding Seroquel, Zyprexa (prolonged QTc causing potential life threatening arrhythmias).   Can continue with Klonopin 0.50mg qmorning, 1mg qhs, and 0.50mg in the middle of the night prn agitation. Hold individual doses of Klonopin if sedated.   I told  not to give Seroquel and Zyprexa until family MD clears use of these meds by checking QTc.    Irvin Reyes M.D.  Psychiatry  [phone]

## 2018-03-23 NOTE — PROGRESS NOTE ADULT - SUBJECTIVE AND OBJECTIVE BOX
CHIEF COMPLAINT:Patient is a 67y old  Female who presents with a chief complaint of cough (17 Mar 2018 01:57)    	        PAST MEDICAL & SURGICAL HISTORY:  CVA (cerebral vascular accident)  Fatty pancreas  PNA (pneumonia)  Pulmonary HTN  IGT (impaired glucose tolerance)  Ulcerative colitis  Acid reflux  Anxiety  Depression  Mouth sores  HLD (hyperlipidemia)  Asthma  Humeral head fracture  H/O: hysterectomy  H/O cataract extraction, left  History of knee replacement          REVIEW OF SYSTEMS:  no responsive to questions  confused    Medications:  MEDICATIONS  (STANDING):  ALBUTerol/ipratropium for Nebulization 3 milliLiter(s) Nebulizer every 6 hours  atorvastatin 20 milliGRAM(s) Oral at bedtime  clonazePAM Tablet 1 milliGRAM(s) Oral at bedtime  clonazePAM Tablet 0.5 milliGRAM(s) Oral <User Schedule>  clopidogrel Tablet 75 milliGRAM(s) Oral daily  enoxaparin Injectable 30 milliGRAM(s) SubCutaneous every 24 hours  ertapenem  IVPB 1000 milliGRAM(s) IV Intermittent every 24 hours  ertapenem  IVPB      famotidine    Tablet 20 milliGRAM(s) Oral two times a day  melatonin 3 milliGRAM(s) Oral at bedtime  OLANZapine 2.5 milliGRAM(s) Oral at bedtime  predniSONE   Tablet 10 milliGRAM(s) Oral daily  QUEtiapine 100 milliGRAM(s) Oral <User Schedule>  sertraline 100 milliGRAM(s) Oral at bedtime    MEDICATIONS  (PRN):    	    PHYSICAL EXAM:  T(C): 36.4 (03-18-18 @ 07:18), Max: 38.4 (03-17-18 @ 23:18)  HR: 102 (03-18-18 @ 07:18) (85 - 111)  BP: 130/83 (03-18-18 @ 07:18) (106/68 - 139/100)  RR: 18 (03-18-18 @ 07:18) (16 - 18)  SpO2: 100% (03-18-18 @ 07:18) (91% - 100%)  Wt(kg): --  I&O's Summary    17 Mar 2018 07:01  -  18 Mar 2018 07:00  --------------------------------------------------------  IN: 120 mL / OUT: 0 mL / NET: 120 mL        HEENT:   Normal oral mucosa, 	  Lymphatic: No lymphadenopathy  Cardiovascular: Normal S1 S2, No JVD, No murmurs, No edema  Respiratory:dec bs  Gastrointestinal:  Soft, Non-tender, + BS	  Skin: No rashes, No ecchymoses, No cyanosis	  Neurologic: Non-focal  Extremities: limited range of motion, No clubbing, cyanosis or edema  Vascular: Peripheral pulses palpable     TELEMETRY: 	    ECG:  	  RADIOLOGY:  OTHER: 	  	  LABS:	 	    CARDIAC MARKERS:  CARDIAC MARKERS ( 16 Mar 2018 17:36 )  x     / 0.05 ng/mL / 105 U/L / x     / 3.2 ng/mL                                9.0    13.01 )-----------( 291      ( 17 Mar 2018 08:29 )             29.1     03-18    134<L>  |  98  |  15  ----------------------------<  106<H>  3.7   |  22  |  0.64    Ca    9.0      18 Mar 2018 06:59  Phos  3.7     03-17  Mg     2.0     03-18    TPro  6.1  /  Alb  3.0<L>  /  TBili  0.5  /  DBili  x   /  AST  19  /  ALT  21  /  AlkPhos  89  03-16    proBNP:   Lipid Profile:   HgA1c:   TSH:
INFECTIOUS DISEASES FOLLOW UP-- Deisy Hadley  [phone]    This is a follow up note for this  67yFemale with history of early onset Alzheimers, presumptive aspiration pneumonia, multiple antibiotic allergies , improving on Zosyn        ROS:  CONSTITUTIONAL:  more awake and alert, answers simple questions    Allergies    ASA; dye contrast (Anaphylaxis)  aspirin (Short breath)  penicillin (Short breath; Rash)  sulfa drugs (Short breath; Rash)    Intolerances        ANTIBIOTICS/RELEVANT:  antimicrobials  ertapenem  IVPB 1000 milliGRAM(s) IV Intermittent every 24 hours  ertapenem  IVPB      fluconAZOLE IVPB      fluconAZOLE IVPB 100 milliGRAM(s) IV Intermittent every 24 hours    immunologic:    OTHER:  ALBUTerol/ipratropium for Nebulization 3 milliLiter(s) Nebulizer every 6 hours  clonazePAM Tablet 1 milliGRAM(s) Oral at bedtime  clonazePAM Tablet 0.5 milliGRAM(s) Oral <User Schedule>  clopidogrel Tablet 75 milliGRAM(s) Oral daily  enoxaparin Injectable 30 milliGRAM(s) SubCutaneous every 24 hours  famotidine Injectable 20 milliGRAM(s) IV Push daily  FIRST- Mouthwash  BLM 5 milliLiter(s) Swish and Spit every 6 hours  methadone    Tablet 5 milliGRAM(s) Oral every 8 hours  morphine  - Injectable 2 milliGRAM(s) IV Push every 6 hours PRN  OLANZapine 2.5 milliGRAM(s) Oral at bedtime  potassium chloride  10 mEq/100 mL IVPB 10 milliEquivalent(s) IV Intermittent every 1 hour  predniSONE   Tablet 10 milliGRAM(s) Oral daily  QUEtiapine 100 milliGRAM(s) Oral <User Schedule>  sertraline 100 milliGRAM(s) Oral at bedtime  sodium chloride 0.9%. 1000 milliLiter(s) IV Continuous <Continuous>      Objective:  Vital Signs Last 24 Hrs  T(C): 37.5 (19 Mar 2018 12:41), Max: 37.5 (19 Mar 2018 12:41)  T(F): 99.5 (19 Mar 2018 12:41), Max: 99.5 (19 Mar 2018 12:41)  HR: 99 (19 Mar 2018 12:41) (92 - 106)  BP: 137/84 (19 Mar 2018 12:41) (126/81 - 144/85)  BP(mean): --  RR: 17 (19 Mar 2018 12:41) (17 - 17)  SpO2: 93% (19 Mar 2018 12:41) (92% - 98%)    PHYSICAL EXAM:  Constitutional:no acute distress  Eyes:JACQUELIN, EOMI  Ear/Nose/Throat: no oral lesions, 	  Respiratory: clear BL anteriorly  Cardiovascular: S1S2  Gastrointestinal:soft, (+) BS, no tenderness,ventral hernia noted  Extremities:no e/e/c  No Lymphadenopathy  IV sites not inflammed.    LABS:                        9.2    10.79 )-----------( 360      ( 19 Mar 2018 09:17 )             28.0     03-19    138  |  102  |  15  ----------------------------<  62<L>  3.3<L>   |  21<L>  |  0.53    Ca    8.8      19 Mar 2018 07:10  Mg     2.0     03-18            MICROBIOLOGY:            RECENT CULTURES:  03-18 @ 05:24  .Blood Blood  --  --  --    No growth to date.  --  03-16 @ 21:57  .Blood Blood-Peripheral  --  --  --    No growth to date.  --  03-16 @ 20:05  .Urine Catheterized  --  --  --    50,000 - 99,000 CFU/mL Coag Negative Staphylococcus  --      RADIOLOGY & ADDITIONAL STUDIES:    < from: Xray Chest 1 View- PORTABLE-Urgent (03.16.18 @ 21:42) >  IMPRESSION:    New multifocal opacities since 3/7/2018 suggestive of multifocal   pneumonia versus asymmetric pulmonary edema.    < end of copied text >
PULMONARY PROGRESS NOTE    MYRIAM HEATH  MRN-[id number]    Patient is a 67y old  Female who presents with a chief complaint of cough (17 Mar 2018 01:57). Also SOB. Hx of dementia, admitted foe pneumonia. On IV antibiotcs. Stable from resp perspective. Unable to swallow-noted oral candidiasis.     ROS: No resp distress-started on Morphine,  -  -    ACTIVE MEDICATION LIST:  MEDICATIONS  (STANDING):  ALBUTerol/ipratropium for Nebulization 3 milliLiter(s) Nebulizer every 6 hours  clonazePAM Tablet 1 milliGRAM(s) Oral at bedtime  clonazePAM Tablet 0.5 milliGRAM(s) Oral <User Schedule>  clopidogrel Tablet 75 milliGRAM(s) Oral daily  enoxaparin Injectable 30 milliGRAM(s) SubCutaneous every 24 hours  ertapenem  IVPB 1000 milliGRAM(s) IV Intermittent every 24 hours  ertapenem  IVPB      fluconAZOLE IVPB      OLANZapine 2.5 milliGRAM(s) Oral at bedtime  pantoprazole  Injectable 40 milliGRAM(s) IV Push daily  predniSONE   Tablet 10 milliGRAM(s) Oral daily  QUEtiapine 100 milliGRAM(s) Oral <User Schedule>  sertraline 100 milliGRAM(s) Oral at bedtime  sodium chloride 0.9%. 1000 milliLiter(s) (50 mL/Hr) IV Continuous <Continuous>    MEDICATIONS  (PRN):  morphine  - Injectable 2 milliGRAM(s) IV Push every 6 hours PRN Severe Pain (7 - 10)      EXAM:  Vital Signs Last 24 Hrs  T(C): 37 (18 Mar 2018 12:24), Max: 38.4 (17 Mar 2018 23:18)  T(F): 98.6 (18 Mar 2018 12:24), Max: 101.2 (17 Mar 2018 23:18)  HR: 104 (18 Mar 2018 12:24) (88 - 111)  BP: 136/83 (18 Mar 2018 12:24) (106/68 - 139/100)  BP(mean): --  RR: 17 (18 Mar 2018 12:24) (16 - 18)  SpO2: 98% (18 Mar 2018 12:24) (91% - 100%)    GENERAL: The patient is awake and alert in no apparent distress.     SKIN: Warm, dry, no rashes    Mouth: Dry mucosa. Tongue coated with white exudate.     LUNGS: Clear to auscultation without wheezing, rales or rhonchi; respirations unlabored    HEART: Regular rate and rhythm without murmur.    ABDOMEN: +BS, Soft, Nontender    EXTREMITIES: No clubbing, cyanosis, edema                              9.7    12.43 )-----------( 342      ( 18 Mar 2018 08:22 )             30.8       03-18    134<L>  |  98  |  15  ----------------------------<  106<H>  3.7   |  22  |  0.64    Ca    9.0      18 Mar 2018 06:59  Phos  3.7     03-17  Mg     2.0     03-18    TPro  6.1  /  Alb  3.0<L>  /  TBili  0.5  /  DBili  x   /  AST  19  /  ALT  21  /  AlkPhos  89  03-16      LIVER FUNCTIONS - ( 16 Mar 2018 17:36 )  Alb: 3.0 g/dL / Pro: 6.1 g/dL / ALK PHOS: 89 U/L / ALT: 21 U/L RC / AST: 19 U/L / GGT: x             < from: Xray Chest 1 View- PORTABLE-Urgent (03.16.18 @ 21:42) >  New multifocal opacities since 3/7/2018 suggestive of multifocal   pneumonia versus asymmetric pulmonary edema.    < end of copied text >          PROBLEM LIST:  67y Female with HEALTH ISSUES - PROBLEM Dx:  Chronic low back pain, unspecified back pain laterality, with sciatica presence unspecified: Chronic low back pain, unspecified back pain laterality, with sciatica presence unspecified  Asthma, unspecified asthma severity, unspecified whether complicated, unspecified whether persistent: Asthma, unspecified asthma severity, unspecified whether complicated, unspecified whether persistent  Hyperlipidemia, unspecified hyperlipidemia type: Hyperlipidemia, unspecified hyperlipidemia type  Early onset Alzheimer's disease with behavioral disturbance: Early onset Alzheimer&#x27;s disease with behavioral disturbance  Gastroesophageal reflux disease, esophagitis presence not specified: Gastroesophageal reflux disease, esophagitis presence not specified  Cerebrovascular accident (CVA), unspecified mechanism: Cerebrovascular accident (CVA), unspecified mechanism  Pneumonia due to infectious organism, unspecified laterality, unspecified part of lung: Pneumonia due to infectious organism, unspecified laterality, unspecified part of lung  Oral Candidiasis            RECS: Cont IV abx.  Mouth care  Add IV fluconazole  Consider palliative care eval        Javier Zimmer MD  [phone]
CHIEF COMPLAINT:Patient is a 67y old  Female who presents with a chief complaint of cough (17 Mar 2018 01:57)    	        PAST MEDICAL & SURGICAL HISTORY:  CVA (cerebral vascular accident)  Fatty pancreas  PNA (pneumonia)  Pulmonary HTN  IGT (impaired glucose tolerance)  Ulcerative colitis  Acid reflux  Anxiety  Depression  Mouth sores  HLD (hyperlipidemia)  Asthma  Humeral head fracture  H/O: hysterectomy  H/O cataract extraction, left  History of knee replacement          REVIEW OF SYSTEMS:  no apparent cp   no resp distress  a t present     Medications:  MEDICATIONS  (STANDING):  ALBUTerol/ipratropium for Nebulization 3 milliLiter(s) Nebulizer every 6 hours  atorvastatin 20 milliGRAM(s) Oral at bedtime  clonazePAM Tablet 1 milliGRAM(s) Oral at bedtime  clonazePAM Tablet 0.5 milliGRAM(s) Oral daily  clopidogrel Tablet 75 milliGRAM(s) Oral daily  enoxaparin Injectable 30 milliGRAM(s) SubCutaneous every 24 hours  famotidine    Tablet 20 milliGRAM(s) Oral two times a day  levoFLOXacin IVPB 750 milliGRAM(s) IV Intermittent every 24 hours  melatonin 3 milliGRAM(s) Oral at bedtime  methadone    Tablet 10 milliGRAM(s) Oral three times a day before meals  OLANZapine 2.5 milliGRAM(s) Oral at bedtime  predniSONE   Tablet 10 milliGRAM(s) Oral daily  QUEtiapine 100 milliGRAM(s) Oral three times a day  sertraline 100 milliGRAM(s) Oral at bedtime  vancomycin  IVPB 750 milliGRAM(s) IV Intermittent every 24 hours    MEDICATIONS  (PRN):    	    PHYSICAL EXAM:  T(C): 36.4 (03-17-18 @ 05:02), Max: 38.4 (03-16-18 @ 17:20)  HR: 86 (03-17-18 @ 05:02) (86 - 104)  BP: 131/84 (03-17-18 @ 05:02) (131/84 - 151/87)  RR: 18 (03-17-18 @ 05:02) (18 - 20)  SpO2: 94% (03-17-18 @ 05:02) (94% - 100%)  Wt(kg): --  I&O's Summary    16 Mar 2018 07:01  -  17 Mar 2018 07:00  --------------------------------------------------------  IN: 200 mL / OUT: 0 mL / NET: 200 mL        Appearance: frail  HEENT:   Normal oral mucosa, PERRL, EOMI	  Lymphatic: No lymphadenopathy  Cardiovascular: Normal S1 S2, No JVD, No murmurs, No edema  Respiratory: dec bs   Gastrointestinal:  Soft, Non-tender, + BS	  Skin: No rashes, No ecchymoses, No cyanosis	  Neurologic: Non-focal  Extremities: Normal range of motion, No clubbing, cyanosis or edema  Vascular: Peripheral pulses palpable    TELEMETRY: 	    ECG:  	  RADIOLOGY:  OTHER: 	  	  LABS:	 	    CARDIAC MARKERS:  CARDIAC MARKERS ( 16 Mar 2018 17:36 )  x     / 0.05 ng/mL / 105 U/L / x     / 3.2 ng/mL                                9.8    14.7  )-----------( 316      ( 16 Mar 2018 17:42 )             30.2     03-17    136  |  100  |  20  ----------------------------<  53<L>  3.4<L>   |  20<L>  |  0.57    Ca    8.2<L>      17 Mar 2018 05:57  Phos  3.7     03-17  Mg     1.2     03-17    TPro  6.1  /  Alb  3.0<L>  /  TBili  0.5  /  DBili  x   /  AST  19  /  ALT  21  /  AlkPhos  89  03-16    proBNP:   Lipid Profile:   HgA1c:   TSH:
CHIEF COMPLAINT:Patient is a 67y old  Female who presents with a chief complaint of cough (19 Mar 2018 11:00)    	        PAST MEDICAL & SURGICAL HISTORY:  CVA (cerebral vascular accident)  Fatty pancreas  PNA (pneumonia)  Pulmonary HTN  IGT (impaired glucose tolerance)  Ulcerative colitis  Acid reflux  Anxiety  Depression  Mouth sores  HLD (hyperlipidemia)  Asthma  Humeral head fracture  H/O: hysterectomy  H/O cataract extraction, left  History of knee replacement          REVIEW OF SYSTEMS:  pt much more alert  verbalizing  no c/p or sob    no n/v       Medications:  MEDICATIONS  (STANDING):  ALBUTerol/ipratropium for Nebulization 3 milliLiter(s) Nebulizer every 6 hours  clonazePAM Tablet 1 milliGRAM(s) Oral at bedtime  clonazePAM Tablet 0.5 milliGRAM(s) Oral <User Schedule>  clopidogrel Tablet 75 milliGRAM(s) Oral daily  enoxaparin Injectable 30 milliGRAM(s) SubCutaneous every 24 hours  ertapenem  IVPB 1000 milliGRAM(s) IV Intermittent every 24 hours  ertapenem  IVPB      famotidine Injectable 20 milliGRAM(s) IV Push daily  FIRST- Mouthwash  BLM 5 milliLiter(s) Swish and Spit every 6 hours  fluconAZOLE IVPB      fluconAZOLE IVPB 100 milliGRAM(s) IV Intermittent every 24 hours  OLANZapine 2.5 milliGRAM(s) Oral at bedtime  potassium chloride  10 mEq/100 mL IVPB 10 milliEquivalent(s) IV Intermittent every 1 hour  predniSONE   Tablet 10 milliGRAM(s) Oral daily  QUEtiapine 100 milliGRAM(s) Oral <User Schedule>  sertraline 100 milliGRAM(s) Oral at bedtime  sodium chloride 0.9%. 1000 milliLiter(s) (50 mL/Hr) IV Continuous <Continuous>    MEDICATIONS  (PRN):  morphine  - Injectable 2 milliGRAM(s) IV Push every 6 hours PRN Severe Pain (7 - 10)    	    PHYSICAL EXAM:  T(C): 37.1 (03-19-18 @ 05:21), Max: 37.2 (03-19-18 @ 00:04)  HR: 98 (03-19-18 @ 05:21) (92 - 106)  BP: 139/81 (03-19-18 @ 05:21) (126/81 - 144/85)  RR: 17 (03-19-18 @ 05:21) (17 - 17)  SpO2: 98% (03-19-18 @ 05:21) (92% - 98%)  Wt(kg): --  I&O's Summary    18 Mar 2018 07:01  -  19 Mar 2018 07:00  --------------------------------------------------------  IN: 1390 mL / OUT: 150 mL / NET: 1240 mL        cachectic  HEENT:   Normal oral mucosa, PERRL, EOMI	  Lymphatic: No lymphadenopathy  Cardiovascular: Normal S1 S2, No JVD, No murmurs, No edema  Respiratory: Ldec bs 	  Gastrointestinal:  Soft, Non-tender, + BS	  Skin: No rashes, No ecchymoses, No cyanosis	  Neurologic: Non-focal motor equal  Extremities: Normal range of motion, No clubbing, cyanosis or edema  Vascular: Peripheral pulses palpable     TELEMETRY: 	    ECG:  	  RADIOLOGY:  OTHER: 	  	  LABS:	 	    CARDIAC MARKERS:                                9.2    10.79 )-----------( 360      ( 19 Mar 2018 09:17 )             28.0     03-19    138  |  102  |  15  ----------------------------<  62<L>  3.3<L>   |  21<L>  |  0.53    Ca    8.8      19 Mar 2018 07:10  Mg     2.0     03-18      proBNP:   Lipid Profile:   HgA1c:   TSH:
CHIEF COMPLAINT:Patient is a 67y old  Female who presents with a chief complaint of cough (19 Mar 2018 11:00)    	    PAST MEDICAL & SURGICAL HISTORY:  CVA (cerebral vascular accident)  Fatty pancreas  PNA (pneumonia)  Pulmonary HTN  IGT (impaired glucose tolerance)  Ulcerative colitis  Acid reflux  Anxiety  Depression  Mouth sores  HLD (hyperlipidemia)  Asthma  Humeral head fracture  H/O: hysterectomy  H/O cataract extraction, left  History of knee replacement          REVIEW OF SYSTEMS:  pt drowsy  verbalizing  no c/p or sob    no n/v         Medications:  MEDICATIONS  (STANDING):  ALBUTerol/ipratropium for Nebulization 3 milliLiter(s) Nebulizer every 6 hours  clonazePAM Tablet 1 milliGRAM(s) Oral at bedtime  clonazePAM Tablet 0.5 milliGRAM(s) Oral <User Schedule>  clopidogrel Tablet 75 milliGRAM(s) Oral daily  dextrose 5% + sodium chloride 0.45%. 1000 milliLiter(s) (50 mL/Hr) IV Continuous <Continuous>  enoxaparin Injectable 30 milliGRAM(s) SubCutaneous every 24 hours  ertapenem  IVPB 1000 milliGRAM(s) IV Intermittent every 24 hours  ertapenem  IVPB      famotidine Injectable 20 milliGRAM(s) IV Push daily  FIRST- Mouthwash  BLM 5 milliLiter(s) Swish and Spit every 6 hours  fluconAZOLE   Tablet 100 milliGRAM(s) Oral every 24 hours  methadone    Tablet 5 milliGRAM(s) Oral every 8 hours  OLANZapine 2.5 milliGRAM(s) Oral at bedtime  predniSONE   Tablet 10 milliGRAM(s) Oral daily  QUEtiapine 100 milliGRAM(s) Oral <User Schedule>  sertraline 100 milliGRAM(s) Oral at bedtime    MEDICATIONS  (PRN):  bisacodyl Suppository 10 milliGRAM(s) Rectal daily PRN Constipation  morphine  - Injectable 2 milliGRAM(s) IV Push every 6 hours PRN Severe Pain (7 - 10)    	    PHYSICAL EXAM:  T(C): 37 (03-21-18 @ 05:17), Max: 37.1 (03-20-18 @ 21:15)  HR: 107 (03-21-18 @ 05:17) (107 - 118)  BP: 144/92 (03-21-18 @ 05:17) (144/92 - 154/89)  RR: 18 (03-21-18 @ 05:17) (18 - 19)  SpO2: 93% (03-21-18 @ 05:17) (93% - 96%)  Wt(kg): --  I&O's Summary    20 Mar 2018 07:01  -  21 Mar 2018 07:00  --------------------------------------------------------  IN: 700 mL / OUT: 0 mL / NET: 700 mL      cachectic  HEENT:   Normal oral mucosa, PERRL, EOMI	  Lymphatic: No lymphadenopathy  Cardiovascular: Normal S1 S2, No JVD, No murmurs, No edema  Respiratory: Ldec bs 	  Gastrointestinal:  Soft, Non-tender, + BS	  Skin: No rashes, No ecchymoses, No cyanosis	  Neurologic: Non-focal motor equal  Extremities: Normal range of motion, No clubbing, cyanosis or edema  Vascular: Peripheral pulses palpable     LABS:	 	    CARDIAC MARKERS:                                9.5    11.77 )-----------( 491      ( 21 Mar 2018 08:39 )             30.7     03-21    138  |  104  |  27<H>  ----------------------------<  148<H>  4.3   |  17<L>  |  0.96    Ca    8.8      21 Mar 2018 07:03      proBNP:   Lipid Profile:   HgA1c:   TSH:
CHIEF COMPLAINT:Patient is a 67y old  Female who presents with a chief complaint of cough (19 Mar 2018 11:00)    	    PAST MEDICAL & SURGICAL HISTORY:  CVA (cerebral vascular accident)  Fatty pancreas  PNA (pneumonia)  Pulmonary HTN  IGT (impaired glucose tolerance)  Ulcerative colitis  Acid reflux  Anxiety  Depression  Mouth sores  HLD (hyperlipidemia)  Asthma  Humeral head fracture  H/O: hysterectomy  H/O cataract extraction, left  History of knee replacement          REVIEW OF SYSTEMS:  pt drowsy  verbalizing  no c/p or sob    no n/v         Medications:  MEDICATIONS  (STANDING):  ALBUTerol/ipratropium for Nebulization 3 milliLiter(s) Nebulizer every 6 hours  clonazePAM Tablet 1 milliGRAM(s) Oral at bedtime  clonazePAM Tablet 0.5 milliGRAM(s) Oral <User Schedule>  clopidogrel Tablet 75 milliGRAM(s) Oral daily  dextrose 5% + sodium chloride 0.45%. 1000 milliLiter(s) (50 mL/Hr) IV Continuous <Continuous>  enoxaparin Injectable 30 milliGRAM(s) SubCutaneous every 24 hours  famotidine    Tablet 20 milliGRAM(s) Oral daily  FIRST- Mouthwash  BLM 5 milliLiter(s) Swish and Spit every 6 hours  fluconAZOLE   Tablet 100 milliGRAM(s) Oral every 24 hours  methadone    Tablet 5 milliGRAM(s) Oral every 8 hours  OLANZapine 2.5 milliGRAM(s) Oral at bedtime  predniSONE   Tablet 10 milliGRAM(s) Oral daily  QUEtiapine 100 milliGRAM(s) Oral <User Schedule>  sertraline 100 milliGRAM(s) Oral at bedtime    MEDICATIONS  (PRN):  bisacodyl Suppository 10 milliGRAM(s) Rectal daily PRN Constipation  morphine  - Injectable 2 milliGRAM(s) IV Push every 6 hours PRN Severe Pain (7 - 10)    	    PHYSICAL EXAM:  T(C): 36.7 (03-23-18 @ 14:07), Max: 37.3 (03-22-18 @ 21:59)  HR: 95 (03-23-18 @ 14:07) (76 - 100)  BP: 141/85 (03-23-18 @ 14:07) (141/85 - 177/93)  RR: 18 (03-23-18 @ 14:07) (18 - 18)  SpO2: 93% (03-23-18 @ 14:07) (93% - 100%)  Wt(kg): --  I&O's Summary    22 Mar 2018 07:01  -  23 Mar 2018 07:00  --------------------------------------------------------  IN: 0 mL / OUT: 750 mL / NET: -750 mL    23 Mar 2018 07:01  -  23 Mar 2018 14:21  --------------------------------------------------------  IN: 0 mL / OUT: 1400 mL / NET: -1400 mL      cachectic  HEENT:   Normal oral mucosa, PERRL, EOMI	  Lymphatic: No lymphadenopathy  Cardiovascular: Normal S1 S2, No JVD, No murmurs, No edema  Respiratory: Ldec bs 	  Gastrointestinal:  Soft, Non-tender, + BS	  Skin: No rashes, No ecchymoses, No cyanosis	  Neurologic: Non-focal motor equal  Extremities: Normal range of motion, No clubbing, cyanosis or edema  Vascular: Peripheral pulses palpable     LABS:	 	    CARDIAC MARKERS:                                8.3    5.95  )-----------( 402      ( 23 Mar 2018 07:50 )             24.8     03-22    140  |  106  |  27<H>  ----------------------------<  140<H>  4.1   |  22  |  0.74    Ca    8.1<L>      22 Mar 2018 07:11      proBNP:   Lipid Profile:   HgA1c:   TSH:
CHIEF COMPLAINT:Patient is a 67y old  Female who presents with a chief complaint of cough (19 Mar 2018 11:00)    	    PAST MEDICAL & SURGICAL HISTORY:  CVA (cerebral vascular accident)  Fatty pancreas  PNA (pneumonia)  Pulmonary HTN  IGT (impaired glucose tolerance)  Ulcerative colitis  Acid reflux  Anxiety  Depression  Mouth sores  HLD (hyperlipidemia)  Asthma  Humeral head fracture  H/O: hysterectomy  H/O cataract extraction, left  History of knee replacement          REVIEW OF SYSTEMS:  pt drowsy  verbalizing  no c/p or sob    no n/v         Medications:  MEDICATIONS  (STANDING):  ALBUTerol/ipratropium for Nebulization 3 milliLiter(s) Nebulizer every 6 hours  clonazePAM Tablet 1 milliGRAM(s) Oral at bedtime  clonazePAM Tablet 0.5 milliGRAM(s) Oral <User Schedule>  clopidogrel Tablet 75 milliGRAM(s) Oral daily  dextrose 5% + sodium chloride 0.45%. 1000 milliLiter(s) (50 mL/Hr) IV Continuous <Continuous>  enoxaparin Injectable 30 milliGRAM(s) SubCutaneous every 24 hours  famotidine    Tablet 20 milliGRAM(s) Oral daily  FIRST- Mouthwash  BLM 5 milliLiter(s) Swish and Spit every 6 hours  fluconAZOLE   Tablet 100 milliGRAM(s) Oral every 24 hours  methadone    Tablet 5 milliGRAM(s) Oral every 8 hours  OLANZapine 2.5 milliGRAM(s) Oral at bedtime  predniSONE   Tablet 10 milliGRAM(s) Oral daily  QUEtiapine 100 milliGRAM(s) Oral <User Schedule>  sertraline 100 milliGRAM(s) Oral at bedtime    MEDICATIONS  (PRN):  bisacodyl Suppository 10 milliGRAM(s) Rectal daily PRN Constipation  morphine  - Injectable 2 milliGRAM(s) IV Push every 6 hours PRN Severe Pain (7 - 10)    	    PHYSICAL EXAM:  T(C): 36.9 (03-22-18 @ 04:21), Max: 36.9 (03-22-18 @ 04:21)  HR: 77 (03-22-18 @ 04:21) (77 - 108)  BP: 133/76 (03-22-18 @ 04:21) (106/66 - 133/76)  RR: 18 (03-22-18 @ 04:21) (18 - 18)  SpO2: 95% (03-22-18 @ 04:21) (92% - 95%)  Wt(kg): --  I&O's Summary    21 Mar 2018 07:01  -  22 Mar 2018 07:00  --------------------------------------------------------  IN: 600 mL / OUT: 1800 mL / NET: -1200 mL      cachectic  HEENT:   Normal oral mucosa, PERRL, EOMI	  Lymphatic: No lymphadenopathy  Cardiovascular: Normal S1 S2, No JVD, No murmurs, No edema  Respiratory: Ldec bs 	  Gastrointestinal:  Soft, Non-tender, + BS	  Skin: No rashes, No ecchymoses, No cyanosis	  Neurologic: Non-focal motor equal  Extremities: Normal range of motion, No clubbing, cyanosis or edema  Vascular: Peripheral pulses palpable     LABS:	 	    CARDIAC MARKERS:                                8.1    6.95  )-----------( 411      ( 22 Mar 2018 10:29 )             26.5     03-22    140  |  106  |  27<H>  ----------------------------<  140<H>  4.1   |  22  |  0.74    Ca    8.1<L>      22 Mar 2018 07:11      proBNP:   Lipid Profile:   HgA1c:   TSH:
CHIEF COMPLAINT:Patient is a 67y old  Female who presents with a chief complaint of cough (19 Mar 2018 11:00)    	    PAST MEDICAL & SURGICAL HISTORY:  CVA (cerebral vascular accident)  Fatty pancreas  PNA (pneumonia)  Pulmonary HTN  IGT (impaired glucose tolerance)  Ulcerative colitis  Acid reflux  Anxiety  Depression  Mouth sores  HLD (hyperlipidemia)  Asthma  Humeral head fracture  H/O: hysterectomy  H/O cataract extraction, left  History of knee replacement          REVIEW OF SYSTEMS:  pt drowsy  verbalizing  no c/p or sob    no n/v         Medications:  MEDICATIONS  (STANDING):  ALBUTerol/ipratropium for Nebulization 3 milliLiter(s) Nebulizer every 6 hours  clonazePAM Tablet 1 milliGRAM(s) Oral at bedtime  clonazePAM Tablet 0.5 milliGRAM(s) Oral <User Schedule>  clopidogrel Tablet 75 milliGRAM(s) Oral daily  enoxaparin Injectable 30 milliGRAM(s) SubCutaneous every 24 hours  ertapenem  IVPB 1000 milliGRAM(s) IV Intermittent every 24 hours  ertapenem  IVPB      famotidine Injectable 20 milliGRAM(s) IV Push daily  FIRST- Mouthwash  BLM 5 milliLiter(s) Swish and Spit every 6 hours  fluconAZOLE IVPB      fluconAZOLE IVPB 100 milliGRAM(s) IV Intermittent every 24 hours  methadone    Tablet 5 milliGRAM(s) Oral every 8 hours  OLANZapine 2.5 milliGRAM(s) Oral at bedtime  predniSONE   Tablet 10 milliGRAM(s) Oral daily  QUEtiapine 100 milliGRAM(s) Oral <User Schedule>  sertraline 100 milliGRAM(s) Oral at bedtime  sodium chloride 0.9%. 1000 milliLiter(s) (50 mL/Hr) IV Continuous <Continuous>    MEDICATIONS  (PRN):  bisacodyl Suppository 10 milliGRAM(s) Rectal daily PRN Constipation  morphine  - Injectable 2 milliGRAM(s) IV Push every 6 hours PRN Severe Pain (7 - 10)    	    PHYSICAL EXAM:  T(C): 36.7 (03-20-18 @ 15:36), Max: 36.8 (03-19-18 @ 21:35)  HR: 110 (03-20-18 @ 15:36) (76 - 114)  BP: 154/89 (03-20-18 @ 15:36) (143/86 - 154/89)  RR: 18 (03-20-18 @ 15:36) (18 - 18)  SpO2: 96% (03-20-18 @ 15:36) (95% - 97%)  Wt(kg): --  I&O's Summary    19 Mar 2018 07:01  -  20 Mar 2018 07:00  --------------------------------------------------------  IN: 900 mL / OUT: 0 mL / NET: 900 mL      cachectic  HEENT:   Normal oral mucosa, PERRL, EOMI	  Lymphatic: No lymphadenopathy  Cardiovascular: Normal S1 S2, No JVD, No murmurs, No edema  Respiratory: Ldec bs 	  Gastrointestinal:  Soft, Non-tender, + BS	  Skin: No rashes, No ecchymoses, No cyanosis	  Neurologic: Non-focal motor equal  Extremities: Normal range of motion, No clubbing, cyanosis or edema  Vascular: Peripheral pulses palpable     LABS:	 	    CARDIAC MARKERS:                                9.5    12.09 )-----------( 420      ( 20 Mar 2018 07:57 )             30.6     03-20    137  |  100  |  19  ----------------------------<  67<L>  4.8   |  17<L>  |  0.63    Ca    8.8      20 Mar 2018 06:29      proBNP:   Lipid Profile:   HgA1c:   TSH:
Events noted.  says pt. is more tired today and not eating.       Vital Signs Last 24 Hrs  T(C): 36.5 (20 Mar 2018 12:05), Max: 37.5 (19 Mar 2018 12:41)  T(F): 97.7 (20 Mar 2018 12:05), Max: 99.5 (19 Mar 2018 12:41)  HR: 109 (20 Mar 2018 12:05) (76 - 114)  BP: 150/85 (20 Mar 2018 12:05) (137/84 - 150/85)  BP(mean): --  RR: 18 (20 Mar 2018 12:05) (17 - 18)  SpO2: 96% (20 Mar 2018 12:05) (93% - 97%)                          9.5    12.09 )-----------( 420      ( 20 Mar 2018 07:57 )             30.6       03-20    137  |  100  |  19  ----------------------------<  67<L>  4.8   |  17<L>  |  0.63    Ca    8.8      20 Mar 2018 06:29                MEDICATIONS  (STANDING):  ALBUTerol/ipratropium for Nebulization 3 milliLiter(s) Nebulizer every 6 hours  clonazePAM Tablet 1 milliGRAM(s) Oral at bedtime  clonazePAM Tablet 0.5 milliGRAM(s) Oral <User Schedule>  clopidogrel Tablet 75 milliGRAM(s) Oral daily  enoxaparin Injectable 30 milliGRAM(s) SubCutaneous every 24 hours  ertapenem  IVPB 1000 milliGRAM(s) IV Intermittent every 24 hours  ertapenem  IVPB      famotidine Injectable 20 milliGRAM(s) IV Push daily  FIRST- Mouthwash  BLM 5 milliLiter(s) Swish and Spit every 6 hours  fluconAZOLE IVPB      fluconAZOLE IVPB 100 milliGRAM(s) IV Intermittent every 24 hours  methadone    Tablet 5 milliGRAM(s) Oral every 8 hours  OLANZapine 2.5 milliGRAM(s) Oral at bedtime  predniSONE   Tablet 10 milliGRAM(s) Oral daily  QUEtiapine 100 milliGRAM(s) Oral <User Schedule>  sertraline 100 milliGRAM(s) Oral at bedtime  sodium chloride 0.9%. 1000 milliLiter(s) (50 mL/Hr) IV Continuous <Continuous>    MEDICATIONS  (PRN):  morphine  - Injectable 2 milliGRAM(s) IV Push every 6 hours PRN Severe Pain (7 - 10)      Elderly WF in bed, awake, calm. Mute. Constricted/flat affect.  Communicates by nod of her head. Offers no complaints.   No tremors nor diaphoresis.
Events noted. Improved agitation.  reports better p.o. intake with tx. of thrush.       Vital Signs Last 24 Hrs  T(C): 37.5 (19 Mar 2018 12:41), Max: 37.5 (19 Mar 2018 12:41)  T(F): 99.5 (19 Mar 2018 12:41), Max: 99.5 (19 Mar 2018 12:41)  HR: 99 (19 Mar 2018 12:41) (92 - 106)  BP: 137/84 (19 Mar 2018 12:41) (126/81 - 144/85)  BP(mean): --  RR: 17 (19 Mar 2018 12:41) (17 - 17)  SpO2: 93% (19 Mar 2018 12:41) (92% - 98%)                          9.2    10.79 )-----------( 360      ( 19 Mar 2018 09:17 )             28.0       03-19    138  |  102  |  15  ----------------------------<  62<L>  3.3<L>   |  21<L>  |  0.53    Ca    8.8      19 Mar 2018 07:10  Mg     2.0     03-18      QTc 494ms          MEDICATIONS  (STANDING):  ALBUTerol/ipratropium for Nebulization 3 milliLiter(s) Nebulizer every 6 hours  clonazePAM Tablet 1 milliGRAM(s) Oral at bedtime  clonazePAM Tablet 0.5 milliGRAM(s) Oral <User Schedule>  clopidogrel Tablet 75 milliGRAM(s) Oral daily  enoxaparin Injectable 30 milliGRAM(s) SubCutaneous every 24 hours  ertapenem  IVPB 1000 milliGRAM(s) IV Intermittent every 24 hours  ertapenem  IVPB      famotidine Injectable 20 milliGRAM(s) IV Push daily  FIRST- Mouthwash  BLM 5 milliLiter(s) Swish and Spit every 6 hours  fluconAZOLE IVPB      fluconAZOLE IVPB 100 milliGRAM(s) IV Intermittent every 24 hours  methadone    Tablet 5 milliGRAM(s) Oral every 8 hours  OLANZapine 2.5 milliGRAM(s) Oral at bedtime  potassium chloride  10 mEq/100 mL IVPB 10 milliEquivalent(s) IV Intermittent every 1 hour  predniSONE   Tablet 10 milliGRAM(s) Oral daily  QUEtiapine 100 milliGRAM(s) Oral <User Schedule>  sertraline 100 milliGRAM(s) Oral at bedtime  sodium chloride 0.9%. 1000 milliLiter(s) (50 mL/Hr) IV Continuous <Continuous>    MEDICATIONS  (PRN):  morphine  - Injectable 2 milliGRAM(s) IV Push every 6 hours PRN Severe Pain (7 - 10)      Elderly WF in bed, calm, cooperative, alert and oriented x 1. Offers no complaints. Does not speak for most of the time I spent in the room.
Events noted. Pt. yesterday was lethargic so most psych. meds and methadone held then and this morning.  slept over and says pt. slept well overnight but became mildly agitated today so Seroquel resumed.       Vital Signs Last 24 Hrs  T(C): 36.9 (22 Mar 2018 12:01), Max: 36.9 (22 Mar 2018 04:21)  T(F): 98.4 (22 Mar 2018 12:01), Max: 98.4 (22 Mar 2018 04:21)  HR: 94 (22 Mar 2018 12:01) (77 - 94)  BP: 127/80 (22 Mar 2018 12:01) (106/66 - 133/76)  BP(mean): --  RR: 18 (22 Mar 2018 12:01) (18 - 18)  SpO2: 98% (22 Mar 2018 12:01) (92% - 98%)                          8.1    6.95  )-----------( 411      ( 22 Mar 2018 10:29 )             26.5       03-22    140  |  106  |  27<H>  ----------------------------<  140<H>  4.1   |  22  |  0.74    Ca    8.1<L>      22 Mar 2018 07:11        QTc 527 and 540ms.         MEDICATIONS  (STANDING):  ALBUTerol/ipratropium for Nebulization 3 milliLiter(s) Nebulizer every 6 hours  clonazePAM Tablet 1 milliGRAM(s) Oral at bedtime  clonazePAM Tablet 0.5 milliGRAM(s) Oral <User Schedule>  clopidogrel Tablet 75 milliGRAM(s) Oral daily  dextrose 5% + sodium chloride 0.45%. 1000 milliLiter(s) (50 mL/Hr) IV Continuous <Continuous>  enoxaparin Injectable 30 milliGRAM(s) SubCutaneous every 24 hours  famotidine    Tablet 20 milliGRAM(s) Oral daily  FIRST- Mouthwash  BLM 5 milliLiter(s) Swish and Spit every 6 hours  fluconAZOLE   Tablet 100 milliGRAM(s) Oral every 24 hours  methadone    Tablet 5 milliGRAM(s) Oral every 8 hours  OLANZapine 2.5 milliGRAM(s) Oral at bedtime  predniSONE   Tablet 10 milliGRAM(s) Oral daily  QUEtiapine 100 milliGRAM(s) Oral <User Schedule>  sertraline 100 milliGRAM(s) Oral at bedtime    MEDICATIONS  (PRN):  bisacodyl Suppository 10 milliGRAM(s) Rectal daily PRN Constipation  morphine  - Injectable 2 milliGRAM(s) IV Push every 6 hours PRN Severe Pain (7 - 10)      Elderly WF in bed, cachectic, calm, alert and oriented x 1 .  At times she is restless, picking at her sheets.  Insight and judgment are poor. Speech is mumbling with  low volume. No hallucinations nor delusions. The patient denied suicidal and homicidal ideation and plan. Mood is euthymic and affect constricted/flat. Impaired  Attention and concentration, short term memory, and long term memory.
Events noted. Some urinary retention. No agitation today. Off Seroquel, Zyprexa as QTc today is 549ms. Ate some food today per .       Vital Signs Last 24 Hrs  T(C): 36.7 (23 Mar 2018 14:07), Max: 37.3 (22 Mar 2018 21:59)  T(F): 98 (23 Mar 2018 14:07), Max: 99.1 (22 Mar 2018 21:59)  HR: 95 (23 Mar 2018 14:07) (76 - 100)  BP: 141/85 (23 Mar 2018 14:07) (141/85 - 177/93)  BP(mean): --  RR: 18 (23 Mar 2018 14:07) (18 - 18)  SpO2: 93% (23 Mar 2018 14:07) (93% - 100%)                          8.3    5.95  )-----------( 402      ( 23 Mar 2018 07:50 )             24.8       03-22    140  |  106  |  27<H>  ----------------------------<  140<H>  4.1   |  22  |  0.74    Ca    8.1<L>      22 Mar 2018 07:11                MEDICATIONS  (STANDING):  ALBUTerol/ipratropium for Nebulization 3 milliLiter(s) Nebulizer every 6 hours  clonazePAM Tablet 1 milliGRAM(s) Oral at bedtime  clonazePAM Tablet 0.5 milliGRAM(s) Oral <User Schedule>  clopidogrel Tablet 75 milliGRAM(s) Oral daily  dextrose 5% + sodium chloride 0.45%. 1000 milliLiter(s) (50 mL/Hr) IV Continuous <Continuous>  enoxaparin Injectable 30 milliGRAM(s) SubCutaneous every 24 hours  famotidine    Tablet 20 milliGRAM(s) Oral daily  FIRST- Mouthwash  BLM 5 milliLiter(s) Swish and Spit every 6 hours  fluconAZOLE   Tablet 100 milliGRAM(s) Oral every 24 hours  methadone    Tablet 5 milliGRAM(s) Oral every 8 hours  OLANZapine 2.5 milliGRAM(s) Oral at bedtime  predniSONE   Tablet 10 milliGRAM(s) Oral daily  QUEtiapine 100 milliGRAM(s) Oral <User Schedule>  sertraline 100 milliGRAM(s) Oral at bedtime    MEDICATIONS  (PRN):  bisacodyl Suppository 10 milliGRAM(s) Rectal daily PRN Constipation  morphine  - Injectable 2 milliGRAM(s) IV Push every 6 hours PRN Severe Pain (7 - 10)      Elderly WF in bed, calm, cooperative, alert and oriented x 1 .  No psychomotor abnormalities. Insight and judgment are poor. Speech is minimal yes/no answers.  No hallucinations nor delusions. The patient denied suicidal and homicidal ideation and plan. Mood is euthymic and affect constricted but smiles on occasion. Impaired Attention and concentration, short term memory, and long term memory.
INTERVAL HPI/OVERNIGHT EVENTS:  Pt lethargic but arousable.   concerned that the psych meds are making her lethargic  Allergies    ASA; dye contrast (Anaphylaxis)  aspirin (Short breath)  penicillin (Short breath; Rash)  sulfa drugs (Short breath; Rash)    Intolerances        ADVANCE DIRECTIVES:    DNR: [ ] NO [ x] YES (Date) MOLST [ ] NO [ ] YES (Date)    MEDICATIONS  (STANDING):  ALBUTerol/ipratropium for Nebulization 3 milliLiter(s) Nebulizer every 6 hours  clonazePAM Tablet 1 milliGRAM(s) Oral at bedtime  clonazePAM Tablet 0.5 milliGRAM(s) Oral <User Schedule>  clopidogrel Tablet 75 milliGRAM(s) Oral daily  enoxaparin Injectable 30 milliGRAM(s) SubCutaneous every 24 hours  ertapenem  IVPB 1000 milliGRAM(s) IV Intermittent every 24 hours  ertapenem  IVPB      famotidine Injectable 20 milliGRAM(s) IV Push daily  FIRST- Mouthwash  BLM 5 milliLiter(s) Swish and Spit every 6 hours  fluconAZOLE IVPB      fluconAZOLE IVPB 100 milliGRAM(s) IV Intermittent every 24 hours  methadone    Tablet 5 milliGRAM(s) Oral every 8 hours  OLANZapine 2.5 milliGRAM(s) Oral at bedtime  predniSONE   Tablet 10 milliGRAM(s) Oral daily  QUEtiapine 100 milliGRAM(s) Oral <User Schedule>  sertraline 100 milliGRAM(s) Oral at bedtime  sodium chloride 0.9%. 1000 milliLiter(s) (50 mL/Hr) IV Continuous <Continuous>    MEDICATIONS  (PRN):  morphine  - Injectable 2 milliGRAM(s) IV Push every 6 hours PRN Severe Pain (7 - 10)      PRESENT SYMPTOMS:  Source: [ ] Patient   [x ] Family   [ ] Team     Pain:                        [x ] No [ ] Yes             [ ] Mild [ ] Moderate [ ] Severe    Onset -  Location -  Duration -  Character -  Alleviating/Aggravating -  Radiation -  Timing -    Dyspnea:                [x ] No [ ] Yes             [ ] Mild [ ] Moderate [ ] Severe    Anxiety:                  [x ] No [ ] Yes             [ ] Mild [ ] Moderate [ ] Severe    Fatigue:                  [ ] No [x ] Yes             [ ] Mild [ ] Moderate [ ]x Severe    Nausea:                  [ x] No [ ] Yes             [ ] Mild [ ] Moderate [ ] Severe    Loss of appetite:   [ ] No x[ ] Yes             [ ] Mild [ ] Moderate [x ] Severe    Constipation:        [ ] No [ x] Yes             [x ] Mild [ ] Moderate [ ] Severe    Other Symptoms:  [ ] All other review of systems negative   [x ] Unable to obtain due to poor mentation     Karnofsky Performance Score/Palliative Performance Status Version 2:  30       %    PHYSICAL EXAM:  Vital Signs Last 24 Hrs  T(C): 36.5 (20 Mar 2018 12:05), Max: 36.8 (19 Mar 2018 21:35)  T(F): 97.7 (20 Mar 2018 12:05), Max: 98.2 (19 Mar 2018 21:35)  HR: 109 (20 Mar 2018 12:05) (76 - 114)  BP: 150/85 (20 Mar 2018 12:05) (143/86 - 150/85)  BP(mean): --  RR: 18 (20 Mar 2018 12:05) (18 - 18)  SpO2: 96% (20 Mar 2018 12:05) (95% - 97%) I&O's Summary    19 Mar 2018 07:01  -  20 Mar 2018 07:00  --------------------------------------------------------  IN: 900 mL / OUT: 0 mL / NET: 900 mL        General:  [x ] Alert  [ ] Oriented x      [ x] Lethargic  [ ] Agitated   [ ] Cachexia   [ ] Unarousable  [ ] Verbal  [ ] Non-Verbal    HEENT:  [ x] Normal   [ ] Dry mouth   [ ] ET Tube    [ ] Trach  [ ] Oral lesions    Lungs:   [ x] Clear [ ] Tachypnea  [ ] Audible excessive secretions   [ ] Rhonchi        [ ] Right [ ] Left [ ] Bilateral  [ ] Crackles        [ ] Right [ ] Left [ ] Bilateral  [ ] Wheezing     [ ] Right [ ] Left [ ] Bilateral    Cardiovascular:  [x ] Regular [ ] Irregular [ ] Tachycardia   [ ] Bradycardia  [ ] Murmur [ ] Other    Abdomen: [x ] Soft  [ ] Distended   [ x] +BS  [ ] Non tender [x ] Tender  [ ]PEG   [ ]OGT/ NGT   Last BM:       Genitourinary: [ ] Normal [x ] Incontinent   [ ] Oliguria/Anuria   [ ] Blackman    Musculoskeletal:  [ ] Normal   [ ] Weakness  [ x] Bedbound/Wheelchair bound [ ] Edema    Neurological: [ ] No focal deficits  [ x] Cognitive impairment  [ ] Dysphagia [ ] Dysarthria [ ] Paresis [ ] Other     Skin: [ x] Normal   [ ] Pressure ulcer(s)                  [ ] Rash    LABS:  [ ] Critical Care time for unstable patient with organ failure.  Total Time:                 minutes  03-20    137  |  100  |  19  ----------------------------<  67<L>  4.8   |  17<L>  |  0.63    Ca    8.8      20 Mar 2018 06:29            Shock: [ ] Septic [ ] Cardiogenic [ ] Neurologic [ ] Hypovolemic  Vasopressors x   Inotrophs x     Oral Intake: [ ] Unable/mouth care only [ ] Minimal [ ] Moderate [ ] Full Capability  Diet: [ ] NPO [ ] Tube feeds [ ] TPN [ ] Other     RADIOLOGY & ADDITIONAL STUDIES:    REFERRALS:   [ ] Chaplaincy  [ ] Hospice  [ ] Child Life  [ ] Social Work  [ ] Case management [ ] Holistic Therapy       RADIOLOGY & ADDITIONAL STUDIES:
INTERVAL HPI/OVERNIGHT EVENTS:  Pt looking much better today as per family and team    Allergies    ASA; dye contrast (Anaphylaxis)  aspirin (Short breath)  penicillin (Short breath; Rash)  sulfa drugs (Short breath; Rash)    Intolerances        ADVANCE DIRECTIVES:    DNR: [ ] NO [ x] YES (Date) MOLST [ ] NO [ ] YES (Date)    MEDICATIONS  (STANDING):  ALBUTerol/ipratropium for Nebulization 3 milliLiter(s) Nebulizer every 6 hours  clonazePAM Tablet 1 milliGRAM(s) Oral at bedtime  clonazePAM Tablet 0.5 milliGRAM(s) Oral <User Schedule>  clopidogrel Tablet 75 milliGRAM(s) Oral daily  enoxaparin Injectable 30 milliGRAM(s) SubCutaneous every 24 hours  ertapenem  IVPB 1000 milliGRAM(s) IV Intermittent every 24 hours  ertapenem  IVPB      famotidine Injectable 20 milliGRAM(s) IV Push daily  FIRST- Mouthwash  BLM 5 milliLiter(s) Swish and Spit every 6 hours  fluconAZOLE IVPB      fluconAZOLE IVPB 100 milliGRAM(s) IV Intermittent every 24 hours  methadone    Tablet 10 milliGRAM(s) Oral three times a day  OLANZapine 2.5 milliGRAM(s) Oral at bedtime  potassium chloride  10 mEq/100 mL IVPB 10 milliEquivalent(s) IV Intermittent every 1 hour  predniSONE   Tablet 10 milliGRAM(s) Oral daily  QUEtiapine 100 milliGRAM(s) Oral <User Schedule>  sertraline 100 milliGRAM(s) Oral at bedtime  sodium chloride 0.9%. 1000 milliLiter(s) (50 mL/Hr) IV Continuous <Continuous>    MEDICATIONS  (PRN):  morphine  - Injectable 2 milliGRAM(s) IV Push every 6 hours PRN Severe Pain (7 - 10)      PRESENT SYMPTOMS:  Source: [ x] Patient   [ ] Family   [ ] Team     Pain:                        [ x] No [ ] Yes             [ ] Mild [ ] Moderate [ ] Severe    Onset -  Location -  Duration -  Character -  Alleviating/Aggravating -  Radiation -  Timing -    Dyspnea:                [x ] No [ ] Yes             [ ] Mild [ ] Moderate [ ] Severe    Anxiety:                  [ ] No [ x] Yes             [ ] Mild [ ] Moderate [x ] Severe    Fatigue:                  [ ] No [ x] Yes             [ ] Mild [ ] Moderate [x ] Severe    Nausea:                  [ x] No [ ] Yes             [ ] Mild [ ] Moderate [ ] Severe    Loss of appetite:   [x ] No [ ] Yes             [ ] Mild [ ] Moderate [ ] Severe    Constipation:        [ ] No [ x] Yes             [ ] Mild [ ] Moderate [x ] Severe  needed to be manually disimpacted yesterday    Other Symptoms:  [ ] All other review of systems negative   [ x] Unable to obtain due to poor mentation     Karnofsky Performance Score/Palliative Performance Status Version 2:      30   %    PHYSICAL EXAM:  Vital Signs Last 24 Hrs  T(C): 37.5 (19 Mar 2018 12:41), Max: 37.5 (19 Mar 2018 12:41)  T(F): 99.5 (19 Mar 2018 12:41), Max: 99.5 (19 Mar 2018 12:41)  HR: 99 (19 Mar 2018 12:41) (92 - 106)  BP: 137/84 (19 Mar 2018 12:41) (126/81 - 144/85)  BP(mean): --  RR: 17 (19 Mar 2018 12:41) (17 - 17)  SpO2: 93% (19 Mar 2018 12:41) (92% - 98%) I&O's Summary    18 Mar 2018 07:01  -  19 Mar 2018 07:00  --------------------------------------------------------  IN: 1390 mL / OUT: 150 mL / NET: 1240 mL        General:  [x ] Alert  [x ] Oriented x  1    [ ] Lethargic  [ ] Agitated   [ ] Cachexia   [ ] Unarousable  [ ] Verbal  [ ] Non-Verbal    HEENT:  [ x] Normal   [ ] Dry mouth   [ ] ET Tube    [ ] Trach  [ ] Oral lesions    Lungs:   [ x] Clear [ ] Tachypnea  [ ] Audible excessive secretions   [ ] Rhonchi        [ ] Right [ ] Left [ ] Bilateral  [ ] Crackles        [ ] Right [ ] Left [ ] Bilateral  [ ] Wheezing     [ ] Right [ ] Left [ ] Bilateral    Cardiovascular:  [ x] Regular [ ] Irregular [ ] Tachycardia   [ ] Bradycardia  [ ] Murmur [ ] Other    Abdomen: [x ] Soft  [ ] Distended   [ x] +BS  [ ] Non tender [x ] Tender  [ ]PEG   [ ]OGT/ NGT   Last BM:       Genitourinary: [ ] Normal [x ] Incontinent   [ ] Oliguria/Anuria   [ ] Blackman    Musculoskeletal:  [ ] Normal   [ x] Weakness  [x ] Bedbound/Wheelchair bound [ ] Edema    Neurological: [ ] No focal deficits  [x ] Cognitive impairment  [ ] Dysphagia [ ] Dysarthria [ ] Paresis [ ] Other     Skin: [x ] Normal   [ ] Pressure ulcer(s)                  [ ] Rash    LABS:  [ ] Critical Care time for unstable patient with organ failure.  Total Time:                 minutes  03-19    138  |  102  |  15  ----------------------------<  62<L>  3.3<L>   |  21<L>  |  0.53    Ca    8.8      19 Mar 2018 07:10  Mg     2.0     03-18            Shock: [ ] Septic [ ] Cardiogenic [ ] Neurologic [ ] Hypovolemic  Vasopressors x   Inotrophs x     Oral Intake: [ ] Unable/mouth care only [ ] Minimal [ ] Moderate [ ] Full Capability  Diet: [ ] NPO [ ] Tube feeds [ ] TPN [ ] Other     RADIOLOGY & ADDITIONAL STUDIES:    REFERRALS:   [ ] Chaplaincy  [ ] Hospice  [ ] Child Life  [ ] Social Work  [ ] Case management [ ] Holistic Therapy       RADIOLOGY & ADDITIONAL STUDIES:
PULMONARY PROGRESS NOTE    MYRAIM HEATH  [id number]    Patient is a 67y old  Female who presents with a chief complaint of cough (17 Mar 2018 01:57)      HPI:  -family at bedside, patient resting comfortably    ROS:   -otherwise negative    MEDICATIONS  (STANDING):  ALBUTerol/ipratropium for Nebulization 3 milliLiter(s) Nebulizer every 6 hours  clonazePAM Tablet 1 milliGRAM(s) Oral at bedtime  clonazePAM Tablet 0.5 milliGRAM(s) Oral <User Schedule>  clopidogrel Tablet 75 milliGRAM(s) Oral daily  enoxaparin Injectable 30 milliGRAM(s) SubCutaneous every 24 hours  ertapenem  IVPB 1000 milliGRAM(s) IV Intermittent every 24 hours  ertapenem  IVPB      famotidine Injectable 20 milliGRAM(s) IV Push daily  FIRST- Mouthwash  BLM 5 milliLiter(s) Swish and Spit every 6 hours  fluconAZOLE IVPB      fluconAZOLE IVPB 100 milliGRAM(s) IV Intermittent every 24 hours  methadone    Tablet 5 milliGRAM(s) Oral every 8 hours  OLANZapine 2.5 milliGRAM(s) Oral at bedtime  predniSONE   Tablet 10 milliGRAM(s) Oral daily  QUEtiapine 100 milliGRAM(s) Oral <User Schedule>  sertraline 100 milliGRAM(s) Oral at bedtime  sodium chloride 0.9%. 1000 milliLiter(s) (50 mL/Hr) IV Continuous <Continuous>    MEDICATIONS  (PRN):  morphine  - Injectable 2 milliGRAM(s) IV Push every 6 hours PRN Severe Pain (7 - 10)        EXAM:  Vital Signs Last 24 Hrs  T(C): 36.7 (20 Mar 2018 05:02), Max: 37.5 (19 Mar 2018 12:41)  T(F): 98.1 (20 Mar 2018 05:02), Max: 99.5 (19 Mar 2018 12:41)  HR: 102 (20 Mar 2018 05:40) (76 - 114)  BP: 144/85 (20 Mar 2018 05:02) (137/84 - 144/85)  BP(mean): --  RR: 18 (20 Mar 2018 05:02) (17 - 18)  SpO2: 97% (20 Mar 2018 05:02) (93% - 97%)    GENERAL: The patient is in no apparent distress.     SKIN: Warm, dry    LUNGS: Clear to auscultation without wheezing    HEART: S1/S2    ABDOMEN: +BS, Soft, Nontender      LABS/IMGAING: reviewed                                   9.5    12.09 )-----------( 420      ( 20 Mar 2018 07:57 )             30.6   03-20    137  |  100  |  19  ----------------------------<  67<L>  4.8   |  17<L>  |  0.63    Ca    8.8      20 Mar 2018 06:29      < from: Xray Chest 1 View- PORTABLE-Urgent (03.16.18 @ 21:42) >  IMPRESSION:    New multifocal opacities since 3/7/2018 suggestive of multifocal   pneumonia versus asymmetric pulmonary edema.    < end of copied text >      PROBLEM LIST:  67y Female with HEALTH ISSUES - PROBLEM Dx:  Pneumonia  Thrush  bronchiectasis  dementia  Oropharyngeal dysphagia  Asthma  Hyperlipidemia  Early onset Alzheimer's disease with behavioral disturbance  Gastroesophageal reflux disease  Cerebrovascular accident (CVA)    RECS:  -abx   -fluconazole  -aspiration precaution  -nebs PRN  -appreciate palliative care input, DNR      Luciana Mancini MD  [phone]
PULMONARY PROGRESS NOTE    MYRIAM HEATH  [id number]    Patient is a 67y old  Female who presents with a chief complaint of cough (17 Mar 2018 01:57)      HPI:  -family at bedside reports patient much better than yesterday, denies cough/chest pain or shortness of breath.    ROS:   -otherwise negative    ACTIVE MEDICATION LIST:  MEDICATIONS  (STANDING):  ALBUTerol/ipratropium for Nebulization 3 milliLiter(s) Nebulizer every 6 hours  clonazePAM Tablet 1 milliGRAM(s) Oral at bedtime  clonazePAM Tablet 0.5 milliGRAM(s) Oral <User Schedule>  clopidogrel Tablet 75 milliGRAM(s) Oral daily  enoxaparin Injectable 30 milliGRAM(s) SubCutaneous every 24 hours  ertapenem  IVPB 1000 milliGRAM(s) IV Intermittent every 24 hours  ertapenem  IVPB      famotidine Injectable 20 milliGRAM(s) IV Push daily  FIRST- Mouthwash  BLM 5 milliLiter(s) Swish and Spit every 6 hours  fluconAZOLE IVPB      fluconAZOLE IVPB 100 milliGRAM(s) IV Intermittent every 24 hours  OLANZapine 2.5 milliGRAM(s) Oral at bedtime  potassium chloride   Solution 40 milliEquivalent(s) Oral once  predniSONE   Tablet 10 milliGRAM(s) Oral daily  QUEtiapine 100 milliGRAM(s) Oral <User Schedule>  sertraline 100 milliGRAM(s) Oral at bedtime  sodium chloride 0.9%. 1000 milliLiter(s) (50 mL/Hr) IV Continuous <Continuous>    MEDICATIONS  (PRN):  morphine  - Injectable 2 milliGRAM(s) IV Push every 6 hours PRN Severe Pain (7 - 10)      EXAM:  Vital Signs Last 24 Hrs  T(C): 37.1 (19 Mar 2018 05:21), Max: 37.2 (19 Mar 2018 00:04)  T(F): 98.8 (19 Mar 2018 05:21), Max: 99 (19 Mar 2018 00:04)  HR: 98 (19 Mar 2018 05:21) (92 - 106)  BP: 139/81 (19 Mar 2018 05:21) (126/81 - 144/85)  BP(mean): --  RR: 17 (19 Mar 2018 05:21) (17 - 17)  SpO2: 98% (19 Mar 2018 05:21) (92% - 98%)    GENERAL: The patient is awake and alert in no apparent distress.     SKIN: Warm, dry, no rashes    LUNGS: Clear to auscultation without wheezing    HEART: S1/S2    ABDOMEN: +BS, Soft, Nontender    EXTREMITIES: No clubbing, cyanosis, edema    LABS/IMGAING: reviewed                        9.2    10.79 )-----------( 360      ( 19 Mar 2018 09:17 )             28.0   03-19    138  |  102  |  15  ----------------------------<  62<L>  3.3<L>   |  21<L>  |  0.53    Ca    8.8      19 Mar 2018 07:10  Mg     2.0     03-18  < from: Xray Chest 1 View- PORTABLE-Urgent (03.16.18 @ 21:42) >  IMPRESSION:    New multifocal opacities since 3/7/2018 suggestive of multifocal   pneumonia versus asymmetric pulmonary edema.    < end of copied text >      PROBLEM LIST:  67y Female with HEALTH ISSUES - PROBLEM Dx:  Pneumonia  Thrush  bronchiectasis  dementia  Oropharyngeal dysphagia  Asthma  Hyperlipidemia  Early onset Alzheimer's disease with behavioral disturbance  Gastroesophageal reflux disease  Cerebrovascular accident (CVA)    RECS:  -abx   -fluconazole  -aspiration precaution  -nebs PRN  -appreciate palliative care input, DNR, hospice?      Luciana Mancini MD  [phone]
PULMONARY PROGRESS NOTE    MYRIAM HEATH  MRN-[id number]    Patient is a 67y old  Female who presents with a chief complaint of cough (17 Mar 2018 01:57)      HPI:  -sleeping comfortably in bed    ROS:   -unable to obtain secondary to dementia/sleepy    MEDICATIONS  (STANDING):  ALBUTerol/ipratropium for Nebulization 3 milliLiter(s) Nebulizer every 6 hours  clonazePAM Tablet 1 milliGRAM(s) Oral at bedtime  clonazePAM Tablet 0.5 milliGRAM(s) Oral <User Schedule>  clopidogrel Tablet 75 milliGRAM(s) Oral daily  dextrose 5% + sodium chloride 0.45%. 1000 milliLiter(s) (50 mL/Hr) IV Continuous <Continuous>  enoxaparin Injectable 30 milliGRAM(s) SubCutaneous every 24 hours  ertapenem  IVPB 1000 milliGRAM(s) IV Intermittent every 24 hours  ertapenem  IVPB      famotidine Injectable 20 milliGRAM(s) IV Push daily  FIRST- Mouthwash  BLM 5 milliLiter(s) Swish and Spit every 6 hours  fluconAZOLE IVPB      fluconAZOLE IVPB 100 milliGRAM(s) IV Intermittent every 24 hours  methadone    Tablet 5 milliGRAM(s) Oral every 8 hours  OLANZapine 2.5 milliGRAM(s) Oral at bedtime  predniSONE   Tablet 10 milliGRAM(s) Oral daily  QUEtiapine 100 milliGRAM(s) Oral <User Schedule>  sertraline 100 milliGRAM(s) Oral at bedtime    MEDICATIONS  (PRN):  bisacodyl Suppository 10 milliGRAM(s) Rectal daily PRN Constipation  morphine  - Injectable 2 milliGRAM(s) IV Push every 6 hours PRN Severe Pain (7 - 10)        EXAM:  Vital Signs Last 24 Hrs  T(C): 37 (21 Mar 2018 05:17), Max: 37.1 (20 Mar 2018 21:15)  T(F): 98.6 (21 Mar 2018 05:17), Max: 98.7 (20 Mar 2018 21:15)  HR: 107 (21 Mar 2018 05:17) (107 - 118)  BP: 144/92 (21 Mar 2018 05:17) (144/92 - 154/89)  BP(mean): --  RR: 18 (21 Mar 2018 05:17) (18 - 19)  SpO2: 93% (21 Mar 2018 05:17) (93% - 96%)    GENERAL: The patient is in no apparent distress.     SKIN: Warm, dry    LUNGS: Clear to auscultation without wheezing    HEART: S1/S2    ABDOMEN: +BS, Soft      LABS/IMGAING: reviewed                                   9.5    12.09 )-----------( 420      ( 20 Mar 2018 07:57 )             30.6   03-21    138  |  104  |  27<H>  ----------------------------<  148<H>  4.3   |  17<L>  |  0.96    Ca    8.8      21 Mar 2018 07:03        < from: Xray Chest 1 View- PORTABLE-Urgent (03.16.18 @ 21:42) >  IMPRESSION:    New multifocal opacities since 3/7/2018 suggestive of multifocal   pneumonia versus asymmetric pulmonary edema.    < end of copied text >      PROBLEM LIST:  67y Female with HEALTH ISSUES - PROBLEM Dx:  Pneumonia  Thrush  bronchiectasis  dementia  Oropharyngeal dysphagia  Asthma  Hyperlipidemia  Early onset Alzheimer's disease with behavioral disturbance  Gastroesophageal reflux disease  Cerebrovascular accident (CVA)    RECS:  -abx   -fluconazole  -aspiration precaution  -nebs PRN  -appreciate palliative care input, DNR      Luciana Mancini MD  [phone]
PULMONARY PROGRESS NOTE    MYRIAM HEATH  MRN-[id number]    Patient is a 67y old  Female who presents with a chief complaint of cough (17 Mar 2018 01:57)      HPI:  -appears comfortable, no complaints,  at bedside wants to go home today    ROS:   -unable to obtain secondary to dementia    MEDICATIONS  (STANDING):  ALBUTerol/ipratropium for Nebulization 3 milliLiter(s) Nebulizer every 6 hours  clonazePAM Tablet 1 milliGRAM(s) Oral at bedtime  clonazePAM Tablet 0.5 milliGRAM(s) Oral <User Schedule>  clopidogrel Tablet 75 milliGRAM(s) Oral daily  dextrose 5% + sodium chloride 0.45%. 1000 milliLiter(s) (50 mL/Hr) IV Continuous <Continuous>  enoxaparin Injectable 30 milliGRAM(s) SubCutaneous every 24 hours  famotidine    Tablet 20 milliGRAM(s) Oral daily  FIRST- Mouthwash  BLM 5 milliLiter(s) Swish and Spit every 6 hours  fluconAZOLE   Tablet 100 milliGRAM(s) Oral every 24 hours  methadone    Tablet 5 milliGRAM(s) Oral every 8 hours  OLANZapine 2.5 milliGRAM(s) Oral at bedtime  predniSONE   Tablet 10 milliGRAM(s) Oral daily  QUEtiapine 100 milliGRAM(s) Oral <User Schedule>  sertraline 100 milliGRAM(s) Oral at bedtime    MEDICATIONS  (PRN):  bisacodyl Suppository 10 milliGRAM(s) Rectal daily PRN Constipation  morphine  - Injectable 2 milliGRAM(s) IV Push every 6 hours PRN Severe Pain (7 - 10)          EXAM:  Vital Signs Last 24 Hrs  T(C): 37.1 (23 Mar 2018 03:43), Max: 37.3 (22 Mar 2018 21:59)  T(F): 98.8 (23 Mar 2018 03:43), Max: 99.1 (22 Mar 2018 21:59)  HR: 76 (23 Mar 2018 05:30) (76 - 100)  BP: 154/88 (23 Mar 2018 05:30) (154/88 - 177/93)  BP(mean): --  RR: 18 (23 Mar 2018 05:30) (18 - 18)  SpO2: 99% (23 Mar 2018 05:30) (95% - 100%)    GENERAL: The patient is in no apparent distress.     SKIN: Warm, dry    LUNGS: Clear to auscultation without wheezing    HEART: S1/S2    ABDOMEN: +BS, Soft      LABS/IMGAING: reviewed                                   8.3    5.95  )-----------( 402      ( 23 Mar 2018 07:50 )             24.8   03-22    140  |  106  |  27<H>  ----------------------------<  140<H>  4.1   |  22  |  0.74    Ca    8.1<L>      22 Mar 2018 07:11            < from: Xray Chest 1 View- PORTABLE-Urgent (03.16.18 @ 21:42) >  IMPRESSION:    New multifocal opacities since 3/7/2018 suggestive of multifocal   pneumonia versus asymmetric pulmonary edema.    < end of copied text >      PROBLEM LIST:  67y Female with HEALTH ISSUES - PROBLEM Dx:  Pneumonia  Thrush  bronchiectasis  dementia  Oropharyngeal dysphagia  Asthma  Hyperlipidemia  Early onset Alzheimer's disease with behavioral disturbance  Gastroesophageal reflux disease  Cerebrovascular accident (CVA)    RECS:  -s/p abx  -fluconazole  -aspiration precaution  -nebs PRN  -appreciate palliative care input, DNR      Luciana Mancini MD  [phone]
PULMONARY PROGRESS NOTE    MYRIAM HEATH  MRN-[id number]    Patient is a 67y old  Female who presents with a chief complaint of cough (17 Mar 2018 01:57)      HPI:  -appears comfortable, no complaints,  at bedside reports she is a little more agitated today    ROS:   -unable to obtain secondary to dementia    MEDICATIONS  (STANDING):  ALBUTerol/ipratropium for Nebulization 3 milliLiter(s) Nebulizer every 6 hours  clonazePAM Tablet 1 milliGRAM(s) Oral at bedtime  clonazePAM Tablet 0.5 milliGRAM(s) Oral <User Schedule>  clopidogrel Tablet 75 milliGRAM(s) Oral daily  dextrose 5% + sodium chloride 0.45%. 1000 milliLiter(s) (50 mL/Hr) IV Continuous <Continuous>  enoxaparin Injectable 30 milliGRAM(s) SubCutaneous every 24 hours  famotidine    Tablet 20 milliGRAM(s) Oral daily  FIRST- Mouthwash  BLM 5 milliLiter(s) Swish and Spit every 6 hours  fluconAZOLE   Tablet 100 milliGRAM(s) Oral every 24 hours  methadone    Tablet 5 milliGRAM(s) Oral every 8 hours  OLANZapine 2.5 milliGRAM(s) Oral at bedtime  predniSONE   Tablet 10 milliGRAM(s) Oral daily  QUEtiapine 100 milliGRAM(s) Oral <User Schedule>  sertraline 100 milliGRAM(s) Oral at bedtime    MEDICATIONS  (PRN):  bisacodyl Suppository 10 milliGRAM(s) Rectal daily PRN Constipation  morphine  - Injectable 2 milliGRAM(s) IV Push every 6 hours PRN Severe Pain (7 - 10)        EXAM:  Vital Signs Last 24 Hrs  T(C): 36.9 (22 Mar 2018 04:21), Max: 36.9 (22 Mar 2018 04:21)  T(F): 98.4 (22 Mar 2018 04:21), Max: 98.4 (22 Mar 2018 04:21)  HR: 77 (22 Mar 2018 04:21) (77 - 108)  BP: 133/76 (22 Mar 2018 04:21) (106/66 - 133/76)  BP(mean): --  RR: 18 (22 Mar 2018 04:21) (18 - 18)  SpO2: 95% (22 Mar 2018 04:21) (92% - 95%)    GENERAL: The patient is in no apparent distress.     SKIN: Warm, dry    LUNGS: Clear to auscultation without wheezing    HEART: S1/S2    ABDOMEN: +BS, Soft      LABS/IMGAING: reviewed                                              9.5    11.77 )-----------( 491      ( 21 Mar 2018 08:39 )             30.7   03-22    140  |  106  |  27<H>  ----------------------------<  140<H>  4.1   |  22  |  0.74    Ca    8.1<L>      22 Mar 2018 07:11        < from: Xray Chest 1 View- PORTABLE-Urgent (03.16.18 @ 21:42) >  IMPRESSION:    New multifocal opacities since 3/7/2018 suggestive of multifocal   pneumonia versus asymmetric pulmonary edema.    < end of copied text >      PROBLEM LIST:  67y Female with HEALTH ISSUES - PROBLEM Dx:  Pneumonia  Thrush  bronchiectasis  dementia  Oropharyngeal dysphagia  Asthma  Hyperlipidemia  Early onset Alzheimer's disease with behavioral disturbance  Gastroesophageal reflux disease  Cerebrovascular accident (CVA)    RECS:  -s/p abx  -fluconazole  -aspiration precaution  -nebs PRN  -appreciate palliative care input, DNR      Luciana Mancini MD  [phone]
UROLOGY PROGRESS NOTE:     Called to see patient for difficult gavin placement.  Unsuccessful attempt by primary team/nursing staff.  16F coude gavin placed under typical sterile technique.  No complications.  Patient tolerated procedure well.  1700cc clear yellow urine drained.  Please call urology before removing gavin
consult to be dicated  pt with ams, improved, family does not want any neuro enrique at this time. Please recall with questions

## 2018-03-23 NOTE — PROGRESS NOTE ADULT - ASSESSMENT
: Pneumonia due to infectious organism, SIRS/sepsis present on admission   pt with significant PCN allergy,   seen by id   abs as per id , completed abx      ·  Problem: Cerebrovascular accident (CVA), unspecified mechanism.  Plan: cont plavix. i      ·  Problem: Gastroesophageal reflux disease, esophagitis presence not specified.  P  cont pepcid.       ·  Problem: Alzheimer's disease with behavioral disturbance   pt with advanced disease   is HCP and he and daughters all agree to maximize quality of life and avoid patient suffering  dnr/dni i  palliative f/u noted  Psych f/u for outpt meds recomendations      ·  Problem: Hyperlipidemia,      Problem: Asthma, unspecified asthma severity, unspecified whether complicated, unspecified whether persistent. Plan:  states pt with difficulty taking inhalers and zyflo  on chronic prednisone for asthma,   will give duoneb q6hr for now and monitor        ·  Problem: Chronic low back pain, unspecified back pain laterality, with sciatica presence unspecified.   cont meds prn    pt seen by psych   meds adjusted as per psych  now with prolonged QTc, will hold psych meds except klonopin and await psych f/u    c/w pleasure feeds   aware of risks  iv fluids while inpt    Urinary retention - c/w Blackman upon d/c    prognosis overall poor  d/c planning

## 2018-03-23 NOTE — CHART NOTE - NSCHARTNOTEFT_GEN_A_CORE
Brief Nutrition Note    Chart reviewed, events noted. Nutrition consult received for pt's  requesting diet education on how to increase pt's nutrient intake at home. Identified food  sources dense in calories/protein that can be added to pt's meals in consistency tolerated by pt. RD remains available.

## 2018-03-23 NOTE — PROCEDURE NOTE - NSPROCDETAILS_GEN_ALL_CORE
a urinary catheter insertion kit was used for all insertion materials/sterile technique, indwelling urinary device inserted
sterile technique, indwelling urinary device inserted/sterile technique, non-indwelling urinary device inserted/a urinary catheter insertion kit was used for all insertion materials

## 2018-03-23 NOTE — CHART NOTE - NSCHARTNOTEFT_GEN_A_CORE
Spoke with Dr Guillen , asked to facilitate patient discharge home. Medication reconciliation reviewed, revised and resolved with Dr Guillen  who has medically cleared patient for discharge with follow up advised

## 2018-03-23 NOTE — PHYSICAL THERAPY INITIAL EVALUATION ADULT - PERTINENT HX OF CURRENT PROBLEM, REHAB EVAL
67 f with advanced and early onset alzheimers disease, h/o CVA, asthma, anxiety, chronic LBP requiring methadone, hld, GERD, just discharged 3/9 for back pain now p/w worsening cough and poor PO intake for the last few days.  Pt has been having worsening dysphagia and had one episode of vomiting.

## 2018-03-23 NOTE — PHYSICAL THERAPY INITIAL EVALUATION ADULT - ADDITIONAL COMMENTS
Pt lives with her  and adult children in a private house with + steps with handrail to enter. Pt lives with her  and adult children in a private house with + steps with handrail to enter. Pt has RW , commode.

## 2018-03-27 RX ORDER — CLONAZEPAM 0.5 MG/1
0.5 TABLET ORAL
Qty: 40 | Refills: 0 | Status: COMPLETED | COMMUNITY
Start: 2018-02-19

## 2018-03-27 RX ORDER — QUETIAPINE FUMARATE 100 MG/1
100 TABLET ORAL
Qty: 90 | Refills: 0 | Status: COMPLETED | COMMUNITY
Start: 2018-01-23

## 2018-03-27 RX ORDER — SERTRALINE HYDROCHLORIDE 100 MG/1
100 TABLET, FILM COATED ORAL
Qty: 90 | Refills: 0 | Status: COMPLETED | COMMUNITY
Start: 2018-02-19

## 2018-04-12 ENCOUNTER — MESSAGE (OUTPATIENT)
Age: 68
End: 2018-04-12

## 2018-06-08 ENCOUNTER — RX RENEWAL (OUTPATIENT)
Age: 68
End: 2018-06-08

## 2018-06-21 ENCOUNTER — INPATIENT (INPATIENT)
Facility: HOSPITAL | Age: 68
LOS: 4 days | Discharge: HOME HEALTH | DRG: 470 | End: 2018-06-26
Attending: ORTHOPAEDIC SURGERY | Admitting: ORTHOPAEDIC SURGERY
Payer: MEDICARE

## 2018-06-21 ENCOUNTER — TRANSCRIPTION ENCOUNTER (OUTPATIENT)
Age: 68
End: 2018-06-21

## 2018-06-21 VITALS
RESPIRATION RATE: 18 BRPM | SYSTOLIC BLOOD PRESSURE: 169 MMHG | TEMPERATURE: 98 F | DIASTOLIC BLOOD PRESSURE: 80 MMHG | OXYGEN SATURATION: 99 % | HEART RATE: 105 BPM

## 2018-06-21 DIAGNOSIS — S72.91XA UNSPECIFIED FRACTURE OF RIGHT FEMUR, INITIAL ENCOUNTER FOR CLOSED FRACTURE: ICD-10-CM

## 2018-06-21 DIAGNOSIS — Z90.710 ACQUIRED ABSENCE OF BOTH CERVIX AND UTERUS: Chronic | ICD-10-CM

## 2018-06-21 DIAGNOSIS — Z98.42 CATARACT EXTRACTION STATUS, LEFT EYE: Chronic | ICD-10-CM

## 2018-06-21 DIAGNOSIS — S42.293A OTHER DISPLACED FRACTURE OF UPPER END OF UNSPECIFIED HUMERUS, INITIAL ENCOUNTER FOR CLOSED FRACTURE: Chronic | ICD-10-CM

## 2018-06-21 DIAGNOSIS — Z96.659 PRESENCE OF UNSPECIFIED ARTIFICIAL KNEE JOINT: Chronic | ICD-10-CM

## 2018-06-21 LAB
ALBUMIN SERPL ELPH-MCNC: 4 G/DL — SIGNIFICANT CHANGE UP (ref 3.3–5)
ALP SERPL-CCNC: 78 U/L — SIGNIFICANT CHANGE UP (ref 40–120)
ALT FLD-CCNC: 27 U/L — SIGNIFICANT CHANGE UP (ref 10–45)
ANION GAP SERPL CALC-SCNC: 15 MMOL/L — SIGNIFICANT CHANGE UP (ref 5–17)
APPEARANCE UR: CLEAR — SIGNIFICANT CHANGE UP
APTT BLD: 29.4 SEC — SIGNIFICANT CHANGE UP (ref 27.5–37.4)
AST SERPL-CCNC: 28 U/L — SIGNIFICANT CHANGE UP (ref 10–40)
BASOPHILS # BLD AUTO: 0 K/UL — SIGNIFICANT CHANGE UP (ref 0–0.2)
BASOPHILS NFR BLD AUTO: 0.2 % — SIGNIFICANT CHANGE UP (ref 0–2)
BILIRUB SERPL-MCNC: 0.2 MG/DL — SIGNIFICANT CHANGE UP (ref 0.2–1.2)
BILIRUB UR-MCNC: NEGATIVE — SIGNIFICANT CHANGE UP
BLD GP AB SCN SERPL QL: NEGATIVE — SIGNIFICANT CHANGE UP
BUN SERPL-MCNC: 28 MG/DL — HIGH (ref 7–23)
CALCIUM SERPL-MCNC: 9.3 MG/DL — SIGNIFICANT CHANGE UP (ref 8.4–10.5)
CHLORIDE SERPL-SCNC: 99 MMOL/L — SIGNIFICANT CHANGE UP (ref 96–108)
CO2 SERPL-SCNC: 23 MMOL/L — SIGNIFICANT CHANGE UP (ref 22–31)
COLOR SPEC: SIGNIFICANT CHANGE UP
CREAT SERPL-MCNC: 0.79 MG/DL — SIGNIFICANT CHANGE UP (ref 0.5–1.3)
DIFF PNL FLD: NEGATIVE — SIGNIFICANT CHANGE UP
EOSINOPHIL # BLD AUTO: 0 K/UL — SIGNIFICANT CHANGE UP (ref 0–0.5)
EOSINOPHIL NFR BLD AUTO: 0.3 % — SIGNIFICANT CHANGE UP (ref 0–6)
GLUCOSE SERPL-MCNC: 137 MG/DL — HIGH (ref 70–99)
GLUCOSE UR QL: NEGATIVE — SIGNIFICANT CHANGE UP
HCT VFR BLD CALC: 25.8 % — LOW (ref 34.5–45)
HGB BLD-MCNC: 8.2 G/DL — LOW (ref 11.5–15.5)
INR BLD: 1.02 RATIO — SIGNIFICANT CHANGE UP (ref 0.88–1.16)
KETONES UR-MCNC: NEGATIVE — SIGNIFICANT CHANGE UP
LEUKOCYTE ESTERASE UR-ACNC: NEGATIVE — SIGNIFICANT CHANGE UP
LYMPHOCYTES # BLD AUTO: 1 K/UL — SIGNIFICANT CHANGE UP (ref 1–3.3)
LYMPHOCYTES # BLD AUTO: 6 % — LOW (ref 13–44)
MCHC RBC-ENTMCNC: 24.6 PG — LOW (ref 27–34)
MCHC RBC-ENTMCNC: 31.9 GM/DL — LOW (ref 32–36)
MCV RBC AUTO: 77.1 FL — LOW (ref 80–100)
MONOCYTES # BLD AUTO: 1 K/UL — HIGH (ref 0–0.9)
MONOCYTES NFR BLD AUTO: 6.2 % — SIGNIFICANT CHANGE UP (ref 2–14)
NEUTROPHILS # BLD AUTO: 14.2 K/UL — HIGH (ref 1.8–7.4)
NEUTROPHILS NFR BLD AUTO: 87.3 % — HIGH (ref 43–77)
NITRITE UR-MCNC: NEGATIVE — SIGNIFICANT CHANGE UP
PH UR: 6.5 — SIGNIFICANT CHANGE UP (ref 5–8)
PLATELET # BLD AUTO: 219 K/UL — SIGNIFICANT CHANGE UP (ref 150–400)
POTASSIUM SERPL-MCNC: 4.7 MMOL/L — SIGNIFICANT CHANGE UP (ref 3.5–5.3)
POTASSIUM SERPL-SCNC: 4.7 MMOL/L — SIGNIFICANT CHANGE UP (ref 3.5–5.3)
PROT SERPL-MCNC: 6.3 G/DL — SIGNIFICANT CHANGE UP (ref 6–8.3)
PROT UR-MCNC: NEGATIVE — SIGNIFICANT CHANGE UP
PROTHROM AB SERPL-ACNC: 11 SEC — SIGNIFICANT CHANGE UP (ref 9.8–12.7)
RBC # BLD: 3.35 M/UL — LOW (ref 3.8–5.2)
RBC # FLD: 16 % — HIGH (ref 10.3–14.5)
RH IG SCN BLD-IMP: NEGATIVE — SIGNIFICANT CHANGE UP
SODIUM SERPL-SCNC: 137 MMOL/L — SIGNIFICANT CHANGE UP (ref 135–145)
SP GR SPEC: 1.01 — SIGNIFICANT CHANGE UP (ref 1.01–1.02)
UROBILINOGEN FLD QL: NEGATIVE — SIGNIFICANT CHANGE UP
WBC # BLD: 16.3 K/UL — HIGH (ref 3.8–10.5)
WBC # FLD AUTO: 16.3 K/UL — HIGH (ref 3.8–10.5)
WBC UR QL: SIGNIFICANT CHANGE UP /HPF (ref 0–5)

## 2018-06-21 PROCEDURE — 73562 X-RAY EXAM OF KNEE 3: CPT | Mod: 26,RT

## 2018-06-21 PROCEDURE — 73502 X-RAY EXAM HIP UNI 2-3 VIEWS: CPT | Mod: 26,RT

## 2018-06-21 PROCEDURE — 71045 X-RAY EXAM CHEST 1 VIEW: CPT | Mod: 26

## 2018-06-21 PROCEDURE — 73552 X-RAY EXAM OF FEMUR 2/>: CPT | Mod: 26,RT

## 2018-06-21 PROCEDURE — 99285 EMERGENCY DEPT VISIT HI MDM: CPT

## 2018-06-21 PROCEDURE — 72170 X-RAY EXAM OF PELVIS: CPT | Mod: 26,59

## 2018-06-21 RX ORDER — MORPHINE SULFATE 50 MG/1
2 CAPSULE, EXTENDED RELEASE ORAL ONCE
Qty: 0 | Refills: 0 | Status: DISCONTINUED | OUTPATIENT
Start: 2018-06-21 | End: 2018-06-21

## 2018-06-21 RX ORDER — OXYCODONE HYDROCHLORIDE 5 MG/1
5 TABLET ORAL ONCE
Qty: 0 | Refills: 0 | Status: DISCONTINUED | OUTPATIENT
Start: 2018-06-21 | End: 2018-06-21

## 2018-06-21 RX ORDER — ACETAMINOPHEN 500 MG
650 TABLET ORAL ONCE
Qty: 0 | Refills: 0 | Status: COMPLETED | OUTPATIENT
Start: 2018-06-21 | End: 2018-06-21

## 2018-06-21 RX ADMIN — MORPHINE SULFATE 2 MILLIGRAM(S): 50 CAPSULE, EXTENDED RELEASE ORAL at 23:43

## 2018-06-21 RX ADMIN — MORPHINE SULFATE 2 MILLIGRAM(S): 50 CAPSULE, EXTENDED RELEASE ORAL at 21:52

## 2018-06-21 RX ADMIN — MORPHINE SULFATE 2 MILLIGRAM(S): 50 CAPSULE, EXTENDED RELEASE ORAL at 22:56

## 2018-06-21 RX ADMIN — MORPHINE SULFATE 2 MILLIGRAM(S): 50 CAPSULE, EXTENDED RELEASE ORAL at 21:20

## 2018-06-21 RX ADMIN — OXYCODONE HYDROCHLORIDE 5 MILLIGRAM(S): 5 TABLET ORAL at 20:03

## 2018-06-21 RX ADMIN — Medication 650 MILLIGRAM(S): at 20:03

## 2018-06-21 RX ADMIN — OXYCODONE HYDROCHLORIDE 5 MILLIGRAM(S): 5 TABLET ORAL at 21:42

## 2018-06-21 RX ADMIN — Medication 650 MILLIGRAM(S): at 21:42

## 2018-06-21 NOTE — CONSULT NOTE ADULT - SUBJECTIVE AND OBJECTIVE BOX
REASON FOR CONSULTATION:  Admitted today, 2018, with an acute right hip fracture.    HISTORY OF PRESENT ILLNESS:  This is one of multiple hospitalizations for this 68-year-old female who was exiting her car on the passenger side and swung her legs to get out of the car, and when she placed her foot down, she had immediate pain with no fall or trauma.  She has a long history of osteoporosis, advanced and she apparently had a sudden fracture of her right femur secondary to osteoporosis.  She had intense pain and was brought to the emergency room where x-rays were performed and confirmed the fracture of the right femoral neck.  The patient is now at bedrest and pain is controlled with immobilization.    MEDICATIONS:  Her medications at time of admission include 10 mg of prednisone once a day, 75 mg of Plavix once a day, Pepcid 20 mg twice a day, Zyflo 600 mg two tablets twice a day, magnesium 100 mg once a day, Spiriva 18 mg one capsule inhaler in the morning, Advair 500/50 one puff twice a day, sertraline 50 mg once a day, Klonopin 2 mg at nighttime daily, Remeron 45 mg at bedtime daily, Seroquel 50 mg once daily, and cannabis oil 1 mL daily.    ALLERGIES:  SHE IS ALLERGIC TO DIVALPROEX, ASPIRIN, PENICILLIN, SULFA, CONTRAST DYE, XANAX, AND HALDOL.    SOCIAL HISTORY:  She does not smoke.  She does not drink.  She does not do drugs.  She is no longer working.  She lives with her  who is her primary caretaker and supervisor.    PAST SURGICAL HISTORY:  Her past surgical history includes hysterectomy, left eye cataract, right total knee replacement, humeral head fracture.    PAST MEDICAL HISTORY:  Her past medical history includes dementia, asthma, osteoporosis, pulmonary hypertension, GERD, anxiety, DJD of LS spine.    FAMILY HISTORY:  Her family history is significant for her father who  of dementia and her mother  at young age of colon cancer.    DIET:  The patient's diet is regular.  She now weighs 100 pounds.  She had been down to 70 pounds on more medications in the past.    REVIEW OF SYSTEMS:  Her review of systems, she has no headaches or dizziness.  She has decreased hearing, normal vision.  She has dental problems with partial replacement.  She has postnasal drip with chronic sinusitis.  She has no difficulty swallowing.  She has seasonal asthma with wheezing and secondary pulmonary hypertension.  She has no cardiac history of arrhythmia or ischemia.  She has no history of cardiomyopathy.  She has no abdominal pain, change in bowel habits or GI bleeding.  She has occasional constipation.  She has no GERD.  She has no dysuria, frequency or incontinence.  She has no rashes or skin disease.  She has no diabetes and she does not have thyroid disease.  She has no joint pains or arthritis at the present time.  Neurologically, she has no residual from her previous stroke and she has chronic dementia.    PHYSICAL EXAMINATION:  GENERAL:  She is a thin female in moderate distress secondary to her right femoral neck fracture.  VITAL SIGNS:  Blood pressure is 110/70, pulse of 68, respirations 16.  HEENT:  Head and neck examination is normal.  LUNGS:  Chest is clear with good breath sounds bilaterally.  HEART:  Cardiac examination is normal.  ABDOMEN:  Soft with no masses, tenderness, guarding or rebound.  EXTREMITIES:  She has 4+ proximal pain of her right hip where her fracture is.  She has no peripheral edema.  NEUROLOGICAL:  She is intact with good sensation distally to touch and position.    LABORATORY DATA:  Laboratories obtained.  CBC, hemoglobin is 8.2, hematocrit is 25.8, white count is elevated at 16.3 consistent with chronic steroids, platelet count is 219,000.  INR 1.02.  PTT is 29.4.  Sodium is 137, potassium 4.7, chloride 99, CO2 is 23, BUN is 28, creatinine 0.79.  Her blood sugar is 137.     DIAGNOSTIC DATA:  Chest x-ray shows no significant abnormalities.  Hip shows an acute subcapital femoral neck fracture as mentioned previously.  EKG shows normal sinus rhythm.  She has a right bundle-branch block with nonspecific ST-T waves.    ASSESSMENT AND PLAN:  The impression at this time is that the patient has advanced osteoporosis and has a spontaneous right femoral neck fracture which requires surgical stabilization.  She has chronic back pain and advanced spinal stenosis for which she will get the epidural blocks in the future.  She has advanced dementia with decreased recent memory and inability to make critical decision.  She has her  present who is her healthcare proxy and who follows her issues closely.  The patient is presently medically optimized and has no medical contraindications to surgery for tomorrow as is required.  Examination time was 70 minutes of which greater than 50% was necessary for bedside discussion and counseling.  The patient will continue to have postoperative medical supervision to lessen the risk of complications.      _______________________    DICT:	LUMA BINGHAM MD  ([id number]) 2018 05:15 AM  TRANS:	[id number] 2018 05:17 AM  JOB:	[id number]    Electronically Signed by:  LUMA BINGHAM 2018 08:59:06 PM

## 2018-06-21 NOTE — ED PROVIDER NOTE - OBJECTIVE STATEMENT
69 yo female 69 yo female with PMHx CVA, HLD, asthma (on chronic steroids prednisone 10mg daily), GERD, Alzheimer's disease and right hip arthroplasty ~5 years ago presenting to ED c/o right hip/leg pain. Pt was stepping out of her car earlier today when she felt acute onset R sided hip/leg pain. She was non-ambulatory immediately following. Daughter and  helped patient home and they used a wheelchair to move her around at home for a few hours. Took advil without relief and given still non-ambulatory and worsening pain brought pt to ED. Pt c/o pain in lateral aspect of right thigh/hip and knee and inability to move leg 2/2 pain. Denies trauma to the area. No cp, sob, numbness/tingling in leg, no fx of hip in past, recent travel/immobilization, headache, fall/LOC, back pain, neck pain or weakness.

## 2018-06-21 NOTE — ED PROVIDER NOTE - CARE PLAN
Principal Discharge DX:	Femur fracture, right Principal Discharge DX:	Femur fracture, right  Goal:	fem neck

## 2018-06-21 NOTE — ED ADULT NURSE NOTE - OBJECTIVE STATEMENT
68 year old female presents to the ED complaining of pain to the right thigh. Pt denies trauma or twisting, PPP present and equal bilaterally. pt able to wiggle toes. Pt unable to bear weight on the right leg. Pt is A&O x 4, VSS, afebrile. Pt denies fever, chills, NVD, SOB, or chest pain. Family at bedside, no obvious physical injury.

## 2018-06-21 NOTE — CONSULT NOTE ADULT - SUBJECTIVE AND OBJECTIVE BOX
The patient was seen and examined tonight after a spontaneous   right femoral neck fracture, secondary to severe osteoporosis which requires orthopedic ORIF. Her comorbidities include senile dementia, asthma with pulmonary hypertension, DJD L/S spine with chronic pain and depression.  Exam, lab, EKG and CXR are stable. The patient is medically stable, medically optimized and has no medical contraindication to surgery tomorrow as required. Exam time 70 minutes including > than 50 % for bedside discussion and counseling. Comprehensive consultation dictated #[id number].

## 2018-06-21 NOTE — ED PROVIDER NOTE - PROGRESS NOTE DETAILS
[facility] xray tech for stat portable xray to r/o R hip dislocation. They area aware- Ryland Conteh PA-C

## 2018-06-21 NOTE — ED PROVIDER NOTE - MUSCULOSKELETAL MINIMAL EXAM
neck supple/RLE: Right leg appears externally rotated at bedside. +tenderness of palpation noted to lateral femur/greater trochanter of R hip as well as anterior distal femur tenderness. Unable to elicit ROM secondary to patient unable due to pain. No ankle tenderness. 2+ dorsalis pedis pulses bilaterally. All other extremities full AROM with 5/5 strength and no obvious deformities, muscle or joint tenderness.

## 2018-06-21 NOTE — ED PROVIDER NOTE - MEDICAL DECISION MAKING DETAILS
Clinical hip fracture.  NV intact.  Doubt dislocation.  XR, pain control, preop labs, reassess.  Ortho c/s.  --BMM

## 2018-06-22 LAB
ANION GAP SERPL CALC-SCNC: 13 MMOL/L — SIGNIFICANT CHANGE UP (ref 5–17)
ANION GAP SERPL CALC-SCNC: 14 MMOL/L — SIGNIFICANT CHANGE UP (ref 5–17)
APTT BLD: 29.7 SEC — SIGNIFICANT CHANGE UP (ref 27.5–37.4)
BUN SERPL-MCNC: 22 MG/DL — SIGNIFICANT CHANGE UP (ref 7–23)
BUN SERPL-MCNC: 25 MG/DL — HIGH (ref 7–23)
CALCIUM SERPL-MCNC: 7.5 MG/DL — LOW (ref 8.4–10.5)
CALCIUM SERPL-MCNC: 8.1 MG/DL — LOW (ref 8.4–10.5)
CHLORIDE SERPL-SCNC: 102 MMOL/L — SIGNIFICANT CHANGE UP (ref 96–108)
CHLORIDE SERPL-SCNC: 102 MMOL/L — SIGNIFICANT CHANGE UP (ref 96–108)
CO2 SERPL-SCNC: 24 MMOL/L — SIGNIFICANT CHANGE UP (ref 22–31)
CO2 SERPL-SCNC: 25 MMOL/L — SIGNIFICANT CHANGE UP (ref 22–31)
CREAT SERPL-MCNC: 0.78 MG/DL — SIGNIFICANT CHANGE UP (ref 0.5–1.3)
CREAT SERPL-MCNC: 0.82 MG/DL — SIGNIFICANT CHANGE UP (ref 0.5–1.3)
GLUCOSE SERPL-MCNC: 103 MG/DL — HIGH (ref 70–99)
GLUCOSE SERPL-MCNC: 124 MG/DL — HIGH (ref 70–99)
HCT VFR BLD CALC: 21.8 % — LOW (ref 34.5–45)
HCT VFR BLD CALC: 25.3 % — LOW (ref 34.5–45)
HGB BLD-MCNC: 7.1 G/DL — LOW (ref 11.5–15.5)
HGB BLD-MCNC: 8.2 G/DL — LOW (ref 11.5–15.5)
INR BLD: 1.05 RATIO — SIGNIFICANT CHANGE UP (ref 0.88–1.16)
MCHC RBC-ENTMCNC: 25.1 PG — LOW (ref 27–34)
MCHC RBC-ENTMCNC: 25.3 PG — LOW (ref 27–34)
MCHC RBC-ENTMCNC: 32.2 GM/DL — SIGNIFICANT CHANGE UP (ref 32–36)
MCHC RBC-ENTMCNC: 32.7 GM/DL — SIGNIFICANT CHANGE UP (ref 32–36)
MCV RBC AUTO: 76.8 FL — LOW (ref 80–100)
MCV RBC AUTO: 78.6 FL — LOW (ref 80–100)
PLATELET # BLD AUTO: 175 K/UL — SIGNIFICANT CHANGE UP (ref 150–400)
PLATELET # BLD AUTO: 188 K/UL — SIGNIFICANT CHANGE UP (ref 150–400)
POTASSIUM SERPL-MCNC: 4.3 MMOL/L — SIGNIFICANT CHANGE UP (ref 3.5–5.3)
POTASSIUM SERPL-MCNC: 4.6 MMOL/L — SIGNIFICANT CHANGE UP (ref 3.5–5.3)
POTASSIUM SERPL-SCNC: 4.3 MMOL/L — SIGNIFICANT CHANGE UP (ref 3.5–5.3)
POTASSIUM SERPL-SCNC: 4.6 MMOL/L — SIGNIFICANT CHANGE UP (ref 3.5–5.3)
PROTHROM AB SERPL-ACNC: 11.4 SEC — SIGNIFICANT CHANGE UP (ref 9.8–12.7)
RBC # BLD: 2.84 M/UL — LOW (ref 3.8–5.2)
RBC # BLD: 3.22 M/UL — LOW (ref 3.8–5.2)
RBC # FLD: 16.1 % — HIGH (ref 10.3–14.5)
RBC # FLD: 16.4 % — HIGH (ref 10.3–14.5)
RH IG SCN BLD-IMP: NEGATIVE — SIGNIFICANT CHANGE UP
SODIUM SERPL-SCNC: 140 MMOL/L — SIGNIFICANT CHANGE UP (ref 135–145)
SODIUM SERPL-SCNC: 140 MMOL/L — SIGNIFICANT CHANGE UP (ref 135–145)
WBC # BLD: 10.7 K/UL — HIGH (ref 3.8–10.5)
WBC # BLD: 12 K/UL — HIGH (ref 3.8–10.5)
WBC # FLD AUTO: 10.7 K/UL — HIGH (ref 3.8–10.5)
WBC # FLD AUTO: 12 K/UL — HIGH (ref 3.8–10.5)

## 2018-06-22 PROCEDURE — 27236 TREAT THIGH FRACTURE: CPT | Mod: RT

## 2018-06-22 PROCEDURE — 72170 X-RAY EXAM OF PELVIS: CPT | Mod: 26

## 2018-06-22 RX ORDER — FAMOTIDINE 10 MG/ML
20 INJECTION INTRAVENOUS
Qty: 0 | Refills: 0 | Status: DISCONTINUED | OUTPATIENT
Start: 2018-06-22 | End: 2018-06-26

## 2018-06-22 RX ORDER — OXYCODONE HYDROCHLORIDE 5 MG/1
5 TABLET ORAL EVERY 4 HOURS
Qty: 0 | Refills: 0 | Status: DISCONTINUED | OUTPATIENT
Start: 2018-06-22 | End: 2018-06-22

## 2018-06-22 RX ORDER — IPRATROPIUM/ALBUTEROL SULFATE 18-103MCG
3 AEROSOL WITH ADAPTER (GRAM) INHALATION EVERY 6 HOURS
Qty: 0 | Refills: 0 | Status: DISCONTINUED | OUTPATIENT
Start: 2018-06-22 | End: 2018-06-26

## 2018-06-22 RX ORDER — BUDESONIDE AND FORMOTEROL FUMARATE DIHYDRATE 160; 4.5 UG/1; UG/1
2 AEROSOL RESPIRATORY (INHALATION)
Qty: 0 | Refills: 0 | Status: DISCONTINUED | OUTPATIENT
Start: 2018-06-22 | End: 2018-06-26

## 2018-06-22 RX ORDER — MIRTAZAPINE 45 MG/1
45 TABLET, ORALLY DISINTEGRATING ORAL DAILY
Qty: 0 | Refills: 0 | Status: DISCONTINUED | OUTPATIENT
Start: 2018-06-22 | End: 2018-06-22

## 2018-06-22 RX ORDER — ACETAMINOPHEN 500 MG
975 TABLET ORAL EVERY 8 HOURS
Qty: 0 | Refills: 0 | Status: DISCONTINUED | OUTPATIENT
Start: 2018-06-22 | End: 2018-06-22

## 2018-06-22 RX ORDER — DOCUSATE SODIUM 100 MG
100 CAPSULE ORAL THREE TIMES A DAY
Qty: 0 | Refills: 0 | Status: DISCONTINUED | OUTPATIENT
Start: 2018-06-22 | End: 2018-06-22

## 2018-06-22 RX ORDER — LANOLIN ALCOHOL/MO/W.PET/CERES
3 CREAM (GRAM) TOPICAL AT BEDTIME
Qty: 0 | Refills: 0 | Status: DISCONTINUED | OUTPATIENT
Start: 2018-06-22 | End: 2018-06-22

## 2018-06-22 RX ORDER — SERTRALINE 25 MG/1
50 TABLET, FILM COATED ORAL DAILY
Qty: 0 | Refills: 0 | Status: DISCONTINUED | OUTPATIENT
Start: 2018-06-22 | End: 2018-06-22

## 2018-06-22 RX ORDER — OXYCODONE HYDROCHLORIDE 5 MG/1
2.5 TABLET ORAL EVERY 4 HOURS
Qty: 0 | Refills: 0 | Status: DISCONTINUED | OUTPATIENT
Start: 2018-06-22 | End: 2018-06-26

## 2018-06-22 RX ORDER — ACETAMINOPHEN 500 MG
650 TABLET ORAL EVERY 6 HOURS
Qty: 0 | Refills: 0 | Status: DISCONTINUED | OUTPATIENT
Start: 2018-06-22 | End: 2018-06-26

## 2018-06-22 RX ORDER — QUETIAPINE FUMARATE 200 MG/1
50 TABLET, FILM COATED ORAL AT BEDTIME
Qty: 0 | Refills: 0 | Status: DISCONTINUED | OUTPATIENT
Start: 2018-06-22 | End: 2018-06-22

## 2018-06-22 RX ORDER — HYDROMORPHONE HYDROCHLORIDE 2 MG/ML
0.5 INJECTION INTRAMUSCULAR; INTRAVENOUS; SUBCUTANEOUS
Qty: 0 | Refills: 0 | Status: DISCONTINUED | OUTPATIENT
Start: 2018-06-22 | End: 2018-06-26

## 2018-06-22 RX ORDER — MIRTAZAPINE 45 MG/1
45 TABLET, ORALLY DISINTEGRATING ORAL DAILY
Qty: 0 | Refills: 0 | Status: DISCONTINUED | OUTPATIENT
Start: 2018-06-22 | End: 2018-06-26

## 2018-06-22 RX ORDER — ONDANSETRON 8 MG/1
4 TABLET, FILM COATED ORAL EVERY 6 HOURS
Qty: 0 | Refills: 0 | Status: DISCONTINUED | OUTPATIENT
Start: 2018-06-22 | End: 2018-06-26

## 2018-06-22 RX ORDER — SODIUM CHLORIDE 9 MG/ML
1000 INJECTION, SOLUTION INTRAVENOUS
Qty: 0 | Refills: 0 | Status: DISCONTINUED | OUTPATIENT
Start: 2018-06-22 | End: 2018-06-26

## 2018-06-22 RX ORDER — LANOLIN ALCOHOL/MO/W.PET/CERES
3 CREAM (GRAM) TOPICAL AT BEDTIME
Qty: 0 | Refills: 0 | Status: DISCONTINUED | OUTPATIENT
Start: 2018-06-22 | End: 2018-06-26

## 2018-06-22 RX ORDER — BUDESONIDE AND FORMOTEROL FUMARATE DIHYDRATE 160; 4.5 UG/1; UG/1
2 AEROSOL RESPIRATORY (INHALATION)
Qty: 0 | Refills: 0 | Status: DISCONTINUED | OUTPATIENT
Start: 2018-06-22 | End: 2018-06-22

## 2018-06-22 RX ORDER — FAMOTIDINE 10 MG/ML
20 INJECTION INTRAVENOUS DAILY
Qty: 0 | Refills: 0 | Status: DISCONTINUED | OUTPATIENT
Start: 2018-06-22 | End: 2018-06-22

## 2018-06-22 RX ORDER — SENNA PLUS 8.6 MG/1
2 TABLET ORAL AT BEDTIME
Qty: 0 | Refills: 0 | Status: DISCONTINUED | OUTPATIENT
Start: 2018-06-22 | End: 2018-06-26

## 2018-06-22 RX ORDER — MAGNESIUM HYDROXIDE 400 MG/1
30 TABLET, CHEWABLE ORAL DAILY
Qty: 0 | Refills: 0 | Status: DISCONTINUED | OUTPATIENT
Start: 2018-06-22 | End: 2018-06-26

## 2018-06-22 RX ORDER — POLYETHYLENE GLYCOL 3350 17 G/17G
17 POWDER, FOR SOLUTION ORAL DAILY
Qty: 0 | Refills: 0 | Status: DISCONTINUED | OUTPATIENT
Start: 2018-06-22 | End: 2018-06-26

## 2018-06-22 RX ORDER — MORPHINE SULFATE 50 MG/1
2 CAPSULE, EXTENDED RELEASE ORAL EVERY 4 HOURS
Qty: 0 | Refills: 0 | Status: DISCONTINUED | OUTPATIENT
Start: 2018-06-22 | End: 2018-06-26

## 2018-06-22 RX ORDER — ENOXAPARIN SODIUM 100 MG/ML
40 INJECTION SUBCUTANEOUS DAILY
Qty: 0 | Refills: 0 | Status: DISCONTINUED | OUTPATIENT
Start: 2018-06-23 | End: 2018-06-26

## 2018-06-22 RX ORDER — TIOTROPIUM BROMIDE 18 UG/1
1 CAPSULE ORAL; RESPIRATORY (INHALATION) DAILY
Qty: 0 | Refills: 0 | Status: DISCONTINUED | OUTPATIENT
Start: 2018-06-22 | End: 2018-06-22

## 2018-06-22 RX ORDER — OXYCODONE HYDROCHLORIDE 5 MG/1
2.5 TABLET ORAL EVERY 4 HOURS
Qty: 0 | Refills: 0 | Status: DISCONTINUED | OUTPATIENT
Start: 2018-06-22 | End: 2018-06-22

## 2018-06-22 RX ORDER — TIOTROPIUM BROMIDE 18 UG/1
1 CAPSULE ORAL; RESPIRATORY (INHALATION) DAILY
Qty: 0 | Refills: 0 | Status: DISCONTINUED | OUTPATIENT
Start: 2018-06-22 | End: 2018-06-26

## 2018-06-22 RX ORDER — DOCUSATE SODIUM 100 MG
100 CAPSULE ORAL THREE TIMES A DAY
Qty: 0 | Refills: 0 | Status: DISCONTINUED | OUTPATIENT
Start: 2018-06-22 | End: 2018-06-26

## 2018-06-22 RX ORDER — OXYCODONE HYDROCHLORIDE 5 MG/1
5 TABLET ORAL EVERY 4 HOURS
Qty: 0 | Refills: 0 | Status: DISCONTINUED | OUTPATIENT
Start: 2018-06-22 | End: 2018-06-26

## 2018-06-22 RX ORDER — CLONAZEPAM 1 MG
0.5 TABLET ORAL DAILY
Qty: 0 | Refills: 0 | Status: DISCONTINUED | OUTPATIENT
Start: 2018-06-22 | End: 2018-06-22

## 2018-06-22 RX ORDER — SERTRALINE 25 MG/1
50 TABLET, FILM COATED ORAL DAILY
Qty: 0 | Refills: 0 | Status: DISCONTINUED | OUTPATIENT
Start: 2018-06-22 | End: 2018-06-26

## 2018-06-22 RX ORDER — SENNA PLUS 8.6 MG/1
2 TABLET ORAL AT BEDTIME
Qty: 0 | Refills: 0 | Status: DISCONTINUED | OUTPATIENT
Start: 2018-06-22 | End: 2018-06-22

## 2018-06-22 RX ORDER — CHLORHEXIDINE GLUCONATE 213 G/1000ML
1 SOLUTION TOPICAL ONCE
Qty: 0 | Refills: 0 | Status: COMPLETED | OUTPATIENT
Start: 2018-06-22 | End: 2018-06-22

## 2018-06-22 RX ORDER — QUETIAPINE FUMARATE 200 MG/1
50 TABLET, FILM COATED ORAL AT BEDTIME
Qty: 0 | Refills: 0 | Status: DISCONTINUED | OUTPATIENT
Start: 2018-06-22 | End: 2018-06-26

## 2018-06-22 RX ORDER — CLONAZEPAM 1 MG
2 TABLET ORAL AT BEDTIME
Qty: 0 | Refills: 0 | Status: DISCONTINUED | OUTPATIENT
Start: 2018-06-22 | End: 2018-06-26

## 2018-06-22 RX ORDER — ACETAMINOPHEN 500 MG
975 TABLET ORAL ONCE
Qty: 0 | Refills: 0 | Status: COMPLETED | OUTPATIENT
Start: 2018-06-22 | End: 2018-06-22

## 2018-06-22 RX ORDER — SODIUM CHLORIDE 9 MG/ML
1000 INJECTION INTRAMUSCULAR; INTRAVENOUS; SUBCUTANEOUS
Qty: 0 | Refills: 0 | Status: DISCONTINUED | OUTPATIENT
Start: 2018-06-22 | End: 2018-06-22

## 2018-06-22 RX ORDER — SODIUM CHLORIDE 9 MG/ML
500 INJECTION INTRAMUSCULAR; INTRAVENOUS; SUBCUTANEOUS ONCE
Qty: 0 | Refills: 0 | Status: COMPLETED | OUTPATIENT
Start: 2018-06-22 | End: 2018-06-22

## 2018-06-22 RX ADMIN — Medication 3 MILLIGRAM(S): at 22:52

## 2018-06-22 RX ADMIN — Medication 3 MILLILITER(S): at 18:10

## 2018-06-22 RX ADMIN — BUDESONIDE AND FORMOTEROL FUMARATE DIHYDRATE 2 PUFF(S): 160; 4.5 AEROSOL RESPIRATORY (INHALATION) at 06:22

## 2018-06-22 RX ADMIN — OXYCODONE HYDROCHLORIDE 5 MILLIGRAM(S): 5 TABLET ORAL at 03:06

## 2018-06-22 RX ADMIN — SODIUM CHLORIDE 75 MILLILITER(S): 9 INJECTION, SOLUTION INTRAVENOUS at 22:52

## 2018-06-22 RX ADMIN — Medication 975 MILLIGRAM(S): at 18:30

## 2018-06-22 RX ADMIN — Medication 10 MILLIGRAM(S): at 06:22

## 2018-06-22 RX ADMIN — SODIUM CHLORIDE 75 MILLILITER(S): 9 INJECTION, SOLUTION INTRAVENOUS at 18:09

## 2018-06-22 RX ADMIN — SODIUM CHLORIDE 75 MILLILITER(S): 9 INJECTION, SOLUTION INTRAVENOUS at 15:46

## 2018-06-22 RX ADMIN — OXYCODONE HYDROCHLORIDE 5 MILLIGRAM(S): 5 TABLET ORAL at 02:06

## 2018-06-22 RX ADMIN — SODIUM CHLORIDE 100 MILLILITER(S): 9 INJECTION INTRAMUSCULAR; INTRAVENOUS; SUBCUTANEOUS at 00:59

## 2018-06-22 RX ADMIN — Medication 3 MILLIGRAM(S): at 02:09

## 2018-06-22 RX ADMIN — SODIUM CHLORIDE 1000 MILLILITER(S): 9 INJECTION INTRAMUSCULAR; INTRAVENOUS; SUBCUTANEOUS at 20:15

## 2018-06-22 RX ADMIN — QUETIAPINE FUMARATE 50 MILLIGRAM(S): 200 TABLET, FILM COATED ORAL at 22:51

## 2018-06-22 RX ADMIN — Medication 975 MILLIGRAM(S): at 18:10

## 2018-06-22 RX ADMIN — CHLORHEXIDINE GLUCONATE 1 APPLICATION(S): 213 SOLUTION TOPICAL at 09:29

## 2018-06-22 RX ADMIN — BUDESONIDE AND FORMOTEROL FUMARATE DIHYDRATE 2 PUFF(S): 160; 4.5 AEROSOL RESPIRATORY (INHALATION) at 18:09

## 2018-06-22 RX ADMIN — SENNA PLUS 2 TABLET(S): 8.6 TABLET ORAL at 22:51

## 2018-06-22 RX ADMIN — Medication 100 MILLIGRAM(S): at 22:51

## 2018-06-22 RX ADMIN — Medication 100 MILLIGRAM(S): at 19:10

## 2018-06-22 RX ADMIN — MIRTAZAPINE 45 MILLIGRAM(S): 45 TABLET, ORALLY DISINTEGRATING ORAL at 22:51

## 2018-06-22 NOTE — PHYSICAL THERAPY INITIAL EVALUATION ADULT - ADDITIONAL COMMENTS
Pt lives in a PH with her family +15 steps to negotiate with U HR. Pt's family at bedside reports pt was ambulating independently without use of an AD and was independent with all ADLs prior. Owns commode and shower chair for walk in shower.

## 2018-06-22 NOTE — PROGRESS NOTE ADULT - SUBJECTIVE AND OBJECTIVE BOX
PULMONARY PROGRESS NOTE    MYRIAM HEATH  MRN-[id number]    Patient is a 68y old  Female who presents with a chief complaint of Hip Fracture (22 Jun 2018 00:03)      HPI:  -Fractured Right hip, for repair today.  Pulmonary clearance requested.  Patient has a history of asthma, last PFT from 6/14/18 showed normal spirometry FEV1 1.8L, 111% predicted.  Currently without respiratory symptoms, denies sob, chest tightness, cough or wheeze. Is anxious about surgery.      ROS:   -no N/V/D    ACTIVE MEDICATION LIST:  MEDICATIONS  (STANDING):  buDESOnide 160 MICROgram(s)/formoterol 4.5 MICROgram(s) Inhaler 2 Puff(s) Inhalation two times a day  clonazePAM Tablet 0.5 milliGRAM(s) Oral daily  docusate sodium 100 milliGRAM(s) Oral three times a day  famotidine    Tablet 20 milliGRAM(s) Oral daily  melatonin 3 milliGRAM(s) Oral at bedtime  mirtazapine 45 milliGRAM(s) Oral daily  predniSONE   Tablet 10 milliGRAM(s) Oral daily  QUEtiapine 50 milliGRAM(s) Oral at bedtime  senna 2 Tablet(s) Oral at bedtime  sertraline 50 milliGRAM(s) Oral daily  sodium chloride 0.9%. 1000 milliLiter(s) (100 mL/Hr) IV Continuous <Continuous>  tiotropium 18 MICROgram(s) Capsule 1 Capsule(s) Inhalation daily    MEDICATIONS  (PRN):  acetaminophen   Tablet 975 milliGRAM(s) Oral every 8 hours PRN Mild Pain  oxyCODONE    IR 2.5 milliGRAM(s) Oral every 4 hours PRN Moderate Pain (4 - 6)  oxyCODONE    IR 5 milliGRAM(s) Oral every 4 hours PRN Severe Pain (7 - 10)      EXAM:  Vital Signs Last 24 Hrs  T(C): 36.7 (22 Jun 2018 08:27), Max: 36.9 (21 Jun 2018 19:14)  T(F): 98.1 (22 Jun 2018 08:27), Max: 98.5 (22 Jun 2018 08:05)  HR: 83 (22 Jun 2018 08:27) (83 - 105)  BP: 142/79 (22 Jun 2018 08:27) (120/74 - 173/91)  BP(mean): --  RR: 18 (22 Jun 2018 08:27) (18 - 18)  SpO2: 97% (22 Jun 2018 04:58) (97% - 99%)    GENERAL: The patient is awake and alert in no apparent distress.     SKIN: Warm, dry, no rashes    LUNGS: Clear to auscultation without wheezing, rales or rhonchi; respirations unlabored    HEART: Regular rate and rhythm without murmur.    ABDOMEN: +BS, Soft, Nontender      LABS/IMGAING: reviewed                        7.1    10.7  )-----------( 188      ( 22 Jun 2018 05:03 )             21.8   06-22    140  |  102  |  25<H>  ----------------------------<  103<H>  4.3   |  24  |  0.82    Ca    8.1<L>      22 Jun 2018 05:03    TPro  6.3  /  Alb  4.0  /  TBili  0.2  /  DBili  x   /  AST  28  /  ALT  27  /  AlkPhos  78  06-21    < from: Xray Chest 1 View AP/PA (06.21.18 @ 22:33) >  IMPRESSION:   Clear lungs.    < end of copied text >    PROBLEM LIST:  68y Female with HEALTH ISSUES - PROBLEM Dx:  Hip Fracture  Asthma  Dementia    RECS:  -Asthma: not in exacerbation at this time, continue symbicort, add PRN duonebs Q6 hours.  -No pulmonary contraindications at this time to proceed with much needed hip fracture repair.  Risks of not having surgery (bedbound, risk of clot etc.)  outweigh risks of having surgery .  -post op pain control, monitor mental status given hx of dementia  -Incentive neelima, OOB when ok from surgical perspective, PT.    Luciana Mancini MD  [phone]

## 2018-06-22 NOTE — CHART NOTE - NSCHARTNOTEFT_GEN_A_CORE
Patient resting without complaints.  Denies chest pain, SOB, N/V.    T(C): 36.7 (06-22-18 @ 15:15)  T(F): 98.1 (06-22-18 @ 15:15)  HR: 76 (06-22-18 @ 15:15)  BP: 138/69 (06-22-18 @ 15:15)  RR: 16 (06-22-18 @ 15:15)  SpO2: 100% (06-22-18 @ 15:15)      Exam:  Alert and Oriented, No Acute Distress    Cardio: RRR S1,S2    Pulm: CTA B/L    R Lower Extremity:  Hip Dsg CDI  Calf Soft, Non-tender  +PF/DF  Sensation grossly intact  +DP Pulse                          8.2    12.0  )-----------( 175      ( 22 Jun 2018 14:56 )             25.3        140  |  102  |  22  ----------------------------<  124<H>  4.6   |  25  |  0.78      Post-op Pelvis XR done.     A/P: 68y Female S/P R hip hemiarthroplasty; Stable    -Pain Control  -DVT PPx; IS  -Am labs  -PT/OT Eval-WBAT RLE  -Continue Current Tx.    Cathy Lozano PA-C  Orthopedic Surgery  [employer]agers [phone]

## 2018-06-22 NOTE — BRIEF OPERATIVE NOTE - PROCEDURE
<<-----Click on this checkbox to enter Procedure Hemiarthroplasty of right hip using metallic unipolar femoral prosthesis  06/22/2018    Active  [email or web address]

## 2018-06-22 NOTE — PHYSICAL THERAPY INITIAL EVALUATION ADULT - PLANNED THERAPY INTERVENTIONS, PT EVAL
bed mobility training/stair training: GOAL: Pt will negotiate up/down 8 steps with railing independently in 4 weeks/balance training/transfer training/gait training

## 2018-06-22 NOTE — PHYSICAL THERAPY INITIAL EVALUATION ADULT - BALANCE TRAINING, PT EVAL
GOAL: Pt. will improve static and dynamic balance to good in order to improve stability, decrease fall risk and increase independence with ADLs within 4 weeks

## 2018-06-22 NOTE — PROGRESS NOTE ADULT - SUBJECTIVE AND OBJECTIVE BOX
Patient is a 68y old  Female who presents with a chief complaint of Hip Fracture (2018 00:03)        MEDICATIONS  (STANDING):  ALBUTerol/ipratropium for Nebulization 3 milliLiter(s) Nebulizer every 6 hours  buDESOnide 160 MICROgram(s)/formoterol 4.5 MICROgram(s) Inhaler 2 Puff(s) Inhalation two times a day  calcium carbonate 1250 mG + Vitamin D (OsCal 500 + D) 1 Tablet(s) Oral daily  clindamycin IVPB 900 milliGRAM(s) IV Intermittent every 8 hours  clonazePAM Tablet 2 milliGRAM(s) Oral at bedtime  docusate sodium 100 milliGRAM(s) Oral three times a day  famotidine    Tablet 20 milliGRAM(s) Oral two times a day  lactated ringers. 1000 milliLiter(s) (75 mL/Hr) IV Continuous <Continuous>  melatonin 3 milliGRAM(s) Oral at bedtime  mirtazapine 45 milliGRAM(s) Oral daily  polyethylene glycol 3350 17 Gram(s) Oral daily  predniSONE   Tablet 10 milliGRAM(s) Oral daily  QUEtiapine 50 milliGRAM(s) Oral at bedtime  senna 2 Tablet(s) Oral at bedtime  sertraline 50 milliGRAM(s) Oral daily  tiotropium 18 MICROgram(s) Capsule 1 Capsule(s) Inhalation daily    MEDICATIONS  (PRN):  acetaminophen   Tablet 650 milliGRAM(s) Oral every 6 hours PRN For Temp greater than 38 C (100.4 F)  acetaminophen   Tablet. 650 milliGRAM(s) Oral every 6 hours PRN Mild Pain (1 - 3)  aluminum hydroxide/magnesium hydroxide/simethicone Suspension 30 milliLiter(s) Oral four times a day PRN Indigestion  HYDROmorphone  Injectable 0.5 milliGRAM(s) IV Push every 10 minutes PRN Moderate Pain (4 - 6)  magnesium hydroxide Suspension 30 milliLiter(s) Oral daily PRN Constipation  morphine  - Injectable 2 milliGRAM(s) IV Push every 4 hours PRN breakthrough pain  ondansetron Injectable 4 milliGRAM(s) IV Push every 6 hours PRN Nausea and/or Vomiting  oxyCODONE    IR 2.5 milliGRAM(s) Oral every 4 hours PRN Moderate Pain (4 - 6)  oxyCODONE    IR 5 milliGRAM(s) Oral every 4 hours PRN Severe Pain (7 - 10)      Allergies    ASA; dye contrast (Anaphylaxis)  aspirin (Short breath)  divalproex sodium (Other (Unknown))  Haldol (Other (Unknown))  penicillin (Short breath; Rash)  sulfa drugs (Short breath; Rash)  Xanax (Other (Unknown))    Intolerances      REVIEW OF SYSTEMS:  CONSTITUTIONAL: No fever, No change in weight, No fatigue  HEAD: No headache, No dizziness, No recent trauma  EYES: No eye pain, No visual disturbances, No discharge  ENT:  No difficulty hearing, No tinnitus, No vertigo, No sinus pain, No throat pain  NECK: No pain, No stiffness  BREASTS: No pain, No masses, No nipple discharge  RESPIRATORY: No cough, No wheezing, No chills, No hemoptysis, No shortness of breath at rest or exertional shortness of breath  CARDIOVASCULAR: No chest pain, No palpitations, No dizziness, No CHF, No arrhythmia, No cardiomegaly, No leg swelling  GASTROINTESTINAL: No abdominal, No epigastric pain. No nausea, No vomiting, No hematemesis, No diarrhea, No constipation. No melena, No hematochezia, No GERD  GENITOURINARY: No dysuria, No frequency, No hematuria, No incontinence, No nocturia, No hesitancy  SKIN: No itching, No burning, No rashes, No lesions   LYMPH NODES: No history of enlarged glands  ENDOCRINE: No heat or cold intolerance, No hair loss. No osteoporosis, No thyroid disease  MUSCULOSKELETAL: No joint pain or swelling, No muscle, back, or extremity pain  PSYCHIATRIC: No depression, No anxiety, No mood swings, No difficulty sleeping  HEME/LYMPH: No easy bruising, No anticoagulants, No bleeding disorder, No bleeding gums  ALLERGY AND IMMUNOLOGIC: No hives, No eczema  NEUROLOGICAL: No memory loss, No loss of strength, No numbness, No tremors  VITALS:   T(C): 36.6 (18 @ 19:00), Max: 36.9 (18 @ 08:05)  HR: 84 (18 @ 19:00) (73 - 93)  BP: 70/43 (18 @ 19:00) (70/43 - 173/91)  RR: 18 (18 @ 19:00) (16 - 18)  SpO2: 95% (18 @ 19:00) (95% - 100%)  Wt(kg): --    PHYSICAL EXAM:  GENERAL: NAD, well nourished and conversant  HEAD:  Atraumatic  EYES: EOM, PERRLA, conjunctiva pink and sclera white  ENT: No tonsillar erythema, exudates, or enlargement, moist mucous membranes, good dentition, no lesions  NECK: Supple, No JVD, normal thyroid, carotids with normal upstrokes and no bruits  CHEST/LUNG: Clear to auscultation bilaterally, No rales, rhonchi, wheezing, or rubs  HEART: Regular rate and rhythm, No murmurs, rubs, or gallops  ABDOMEN: Soft, nondistended, no masses, guarding, tenderness or rebound, bowel sounds present  EXTREMITIES:  2+ Peripheral Pulses, No clubbing, cyanosis, or edema. No arthritis of shoulders, elbows, hands, hips, knees, ankles, or feet. No DJD C spine, T spine, or L/S spine  LYMPH: No lymphadenopathy noted  SKIN: No rashes or lesions  NERVOUS SYSTEM:  Alert & Oriented X3, normal cognitive function. Motor Strength 5/5 right upper and right lower.  5/5 left upper and left lower extremities, DTRs 2+ intact and symmetric    LABS:        CBC Full  -  ( 2018 14:56 )  WBC Count : 12.0 K/uL  Hemoglobin : 8.2 g/dL  Hematocrit : 25.3 %  Platelet Count - Automated : 175 K/uL  Mean Cell Volume : 78.6 fl  Mean Cell Hemoglobin : 25.3 pg  Mean Cell Hemoglobin Concentration : 32.2 gm/dL  Auto Neutrophil # : x  Auto Lymphocyte # : x  Auto Monocyte # : x  Auto Eosinophil # : x  Auto Basophil # : x  Auto Neutrophil % : x  Auto Lymphocyte % : x  Auto Monocyte % : x  Auto Eosinophil % : x  Auto Basophil % : x        140  |  102  |  22  ----------------------------<  124<H>  4.6   |  25  |  0.78    Ca    7.5<L>      2018 14:56    TPro  6.3  /  Alb  4.0  /  TBili  0.2  /  DBili  x   /  AST  28  /  ALT  27  /  AlkPhos  78  06-21    LIVER FUNCTIONS - ( 2018 20:28 )  Alb: 4.0 g/dL / Pro: 6.3 g/dL / ALK PHOS: 78 U/L / ALT: 27 U/L / AST: 28 U/L / GGT: x           PT/INR - ( 2018 05:03 )   PT: 11.4 sec;   INR: 1.05 ratio         PTT - ( 2018 05:03 )  PTT:29.7 sec  Urinalysis Basic - ( 2018 22:03 )    Color: PL Yellow / Appearance: Clear / S.010 / pH: x  Gluc: x / Ketone: Negative  / Bili: Negative / Urobili: Negative   Blood: x / Protein: Negative / Nitrite: Negative   Leuk Esterase: Negative / RBC: x / WBC 0-2 /HPF   Sq Epi: x / Non Sq Epi: x / Bacteria: x      CAPILLARY BLOOD GLUCOSE          RADIOLOGY & ADDITIONAL TESTS:      Consultant(s):    Care Discussed with Consultants/Other Providers [ ] YES  [ ] NO Patient is a 68y old  Female who presents with a chief complaint of right Hip pain and x rays confirm a  right femoral neck fracture. She underwent right hemiarthroplasty on 18 and is now post-op . She has moderate right hip pain and narcotics are held with post-op hypotension. She is receiving IV Nacl at this time and BP is averaging at 90 systolic. She remains awake and alert, but cognitive function is compromised by her baseline dementia. She has no other new medical complaints at this time.        MEDICATIONS  (STANDING):  ALBUTerol/ipratropium for Nebulization 3 milliLiter(s) Nebulizer every 6 hours  buDESOnide 160 MICROgram(s)/formoterol 4.5 MICROgram(s) Inhaler 2 Puff(s) Inhalation two times a day  calcium carbonate 1250 mG + Vitamin D (OsCal 500 + D) 1 Tablet(s) Oral daily  clindamycin IVPB 900 milliGRAM(s) IV Intermittent every 8 hours  clonazePAM Tablet 2 milliGRAM(s) Oral at bedtime  docusate sodium 100 milliGRAM(s) Oral three times a day  famotidine    Tablet 20 milliGRAM(s) Oral two times a day  lactated ringers. 1000 milliLiter(s) (75 mL/Hr) IV Continuous <Continuous>  melatonin 3 milliGRAM(s) Oral at bedtime  mirtazapine 45 milliGRAM(s) Oral daily  polyethylene glycol 3350 17 Gram(s) Oral daily  predniSONE   Tablet 10 milliGRAM(s) Oral daily  QUEtiapine 50 milliGRAM(s) Oral at bedtime  senna 2 Tablet(s) Oral at bedtime  sertraline 50 milliGRAM(s) Oral daily  tiotropium 18 MICROgram(s) Capsule 1 Capsule(s) Inhalation daily    MEDICATIONS  (PRN):  acetaminophen   Tablet 650 milliGRAM(s) Oral every 6 hours PRN For Temp greater than 38 C (100.4 F)  acetaminophen   Tablet. 650 milliGRAM(s) Oral every 6 hours PRN Mild Pain (1 - 3)  aluminum hydroxide/magnesium hydroxide/simethicone Suspension 30 milliLiter(s) Oral four times a day PRN Indigestion  HYDROmorphone  Injectable 0.5 milliGRAM(s) IV Push every 10 minutes PRN Moderate Pain (4 - 6)  magnesium hydroxide Suspension 30 milliLiter(s) Oral daily PRN Constipation  morphine  - Injectable 2 milliGRAM(s) IV Push every 4 hours PRN breakthrough pain  ondansetron Injectable 4 milliGRAM(s) IV Push every 6 hours PRN Nausea and/or Vomiting  oxyCODONE    IR 2.5 milliGRAM(s) Oral every 4 hours PRN Moderate Pain (4 - 6)  oxyCODONE    IR 5 milliGRAM(s) Oral every 4 hours PRN Severe Pain (7 - 10)      Allergies    ASA; dye contrast (Anaphylaxis)  aspirin (Short breath)  divalproex sodium (Other (Unknown))  Haldol (Other (Unknown))  penicillin (Short breath; Rash)  sulfa drugs (Short breath; Rash)  Xanax (Other (Unknown))    Intolerances      VITALS:   T(C): 36.6 (18 @ 19:00), Max: 36.9 (18 @ 08:05)  HR: 84 (18 @ 19:00) (73 - 93)  BP: 70/43 (18 @ 19:00) (70/43 - 173/91)  RR: 18 (18 @ 19:00) (16 - 18)  SpO2: 95% (18 @ 19:00) (95% - 100%)  Wt(kg): --    PHYSICAL EXAM:  GENERAL: NAD, well nourished and conversant  HEAD:  Atraumatic  EYES: EOM, PERRLA, conjunctiva pink and sclera white  ENT: No tonsillar erythema, exudates, or enlargement, moist mucous membranes, good dentition, no lesions  NECK: Supple, No JVD, normal thyroid, carotids with normal upstrokes and no bruits  CHEST/LUNG: Clear to auscultation bilaterally, No rales, rhonchi, wheezing, or rubs  HEART: Regular rate and rhythm, No murmurs, rubs, or gallops  ABDOMEN: Soft, nondistended, no masses, guarding, tenderness or rebound, bowel sounds present  EXTREMITIES:  2+ Peripheral Pulses. 2+ right hip swelling and 3+ tenderness to touch. No erythema or incisional drainage.  LYMPH: No lymphadenopathy noted  SKIN: No rashes or lesions  NERVOUS SYSTEM:  Alert & Oriented X3, normal cognitive function. Motor Strength 5/5 right upper and right lower.  5/5 left upper and left lower extremities, DTRs 2+ intact and symmetric    LABS:        CBC Full  -  ( 2018 14:56 )  WBC Count : 12.0 K/uL  Hemoglobin : 8.2 g/dL  Hematocrit : 25.3 %  Platelet Count - Automated : 175 K/uL  Mean Cell Volume : 78.6 fl  Mean Cell Hemoglobin : 25.3 pg  Mean Cell Hemoglobin Concentration : 32.2 gm/dL  Auto Neutrophil # : x  Auto Lymphocyte # : x  Auto Monocyte # : x  Auto Eosinophil # : x  Auto Basophil # : x  Auto Neutrophil % : x  Auto Lymphocyte % : x  Auto Monocyte % : x  Auto Eosinophil % : x  Auto Basophil % : x        140  |  102  |  22  ----------------------------<  124<H>  4.6   |  25  |  0.78    Ca    7.5<L>      2018 14:56    TPro  6.3  /  Alb  4.0  /  TBili  0.2  /  DBili  x   /  AST  28  /  ALT  27  /  AlkPhos  78  06-21    LIVER FUNCTIONS - ( 2018 20:28 )  Alb: 4.0 g/dL / Pro: 6.3 g/dL / ALK PHOS: 78 U/L / ALT: 27 U/L / AST: 28 U/L / GGT: x           PT/INR - ( 2018 05:03 )   PT: 11.4 sec;   INR: 1.05 ratio         PTT - ( 2018 05:03 )  PTT:29.7 sec  Urinalysis Basic - ( 2018 22:03 )    Color: PL Yellow / Appearance: Clear / S.010 / pH: x  Gluc: x / Ketone: Negative  / Bili: Negative / Urobili: Negative   Blood: x / Protein: Negative / Nitrite: Negative   Leuk Esterase: Negative / RBC: x / WBC 0-2 /HPF   Sq Epi: x / Non Sq Epi: x / Bacteria: x      CAPILLARY BLOOD GLUCOSE          RADIOLOGY & ADDITIONAL TESTS:      Consultant(s):    Care Discussed with Consultants/Other Providers [ ] YES  [ ] NO

## 2018-06-22 NOTE — H&P ADULT - ATTENDING COMMENTS
R Femoral neck fracture indicated for R hip hemiarthroplasty. All RBAs discussed. All questions answered. Informed consent obtained.

## 2018-06-22 NOTE — H&P ADULT - FAMILY HISTORY
Father  Still living? Unknown  Family history of colon cancer, Age at diagnosis: Age Unknown  Family history of dementia, Age at diagnosis: Age Unknown

## 2018-06-22 NOTE — PHYSICAL THERAPY INITIAL EVALUATION ADULT - PERTINENT HX OF CURRENT PROBLEM, REHAB EVAL
69 yo F presents to [facility] ED following atraumatic R hip pain. Patient was getting out of a car when she felt severe R hip pain and was unable to ambulate afterwards. She then presented to the [facility] ED for evaluation. Per family, she does not ambulate well at baseline secondary to medical comorbidities and knee pain. Patient has a history of severe asthma (requiring chronic prednisone), treated by Dr. Francis.

## 2018-06-22 NOTE — H&P ADULT - ASSESSMENT
A/P: 68 year old female with R Femoral neck fracture  - Admit to Ortho  - OR Planning  - Preoperative Labs: CBC, BMP, PT/INR, Type and Screen, UA, CXR, EKG  - Consent in chart  - Medicine Clearance  - NPO  - Hold anticoagulation for OR

## 2018-06-22 NOTE — PROVIDER CONTACT NOTE (OTHER) - BACKGROUND
PT post R hip arina arthroplasty day 0; Pre-op pt received 1 unit PRBC for decreased hgb and hct; currently electronic BP 70/43, pt asymptomatic, denies dizziness, lightheadedness; pt resting in bed

## 2018-06-22 NOTE — H&P ADULT - HISTORY OF PRESENT ILLNESS
68 year old female presented to the [facility] ED following atraumatic R hip pain. Patient was getting out of a car when she felt severe R hip pain and was unable to ambulate afterwards. She then presented to the [facility] ED for evaluation. No recent fevers/chills. No recent weight loss. Patient has a history of R TKA by Dr. Duque in 2013. Per family, she does not ambulate well at baseline secondary to medical comorbidities and knee pain. Patient has a history of severe asthma (requiring chronic prednisone), treated by Dr. Francis.

## 2018-06-23 LAB
HCT VFR BLD CALC: 27.5 % — LOW (ref 34.5–45)
HGB BLD-MCNC: 9.4 G/DL — LOW (ref 11.5–15.5)
MCHC RBC-ENTMCNC: 27.9 PG — SIGNIFICANT CHANGE UP (ref 27–34)
MCHC RBC-ENTMCNC: 34 GM/DL — SIGNIFICANT CHANGE UP (ref 32–36)
MCV RBC AUTO: 82.2 FL — SIGNIFICANT CHANGE UP (ref 80–100)
PLATELET # BLD AUTO: 161 K/UL — SIGNIFICANT CHANGE UP (ref 150–400)
RBC # BLD: 3.35 M/UL — LOW (ref 3.8–5.2)
RBC # FLD: 16.4 % — HIGH (ref 10.3–14.5)
WBC # BLD: 17.9 K/UL — HIGH (ref 3.8–10.5)
WBC # FLD AUTO: 17.9 K/UL — HIGH (ref 3.8–10.5)

## 2018-06-23 RX ORDER — ACETAMINOPHEN 500 MG
650 TABLET ORAL ONCE
Qty: 0 | Refills: 0 | Status: COMPLETED | OUTPATIENT
Start: 2018-06-23 | End: 2018-06-23

## 2018-06-23 RX ADMIN — Medication 100 MILLIGRAM(S): at 22:28

## 2018-06-23 RX ADMIN — ONDANSETRON 4 MILLIGRAM(S): 8 TABLET, FILM COATED ORAL at 02:44

## 2018-06-23 RX ADMIN — BUDESONIDE AND FORMOTEROL FUMARATE DIHYDRATE 2 PUFF(S): 160; 4.5 AEROSOL RESPIRATORY (INHALATION) at 17:50

## 2018-06-23 RX ADMIN — Medication 3 MILLIGRAM(S): at 22:28

## 2018-06-23 RX ADMIN — ENOXAPARIN SODIUM 40 MILLIGRAM(S): 100 INJECTION SUBCUTANEOUS at 13:16

## 2018-06-23 RX ADMIN — OXYCODONE HYDROCHLORIDE 5 MILLIGRAM(S): 5 TABLET ORAL at 22:27

## 2018-06-23 RX ADMIN — OXYCODONE HYDROCHLORIDE 5 MILLIGRAM(S): 5 TABLET ORAL at 14:58

## 2018-06-23 RX ADMIN — BUDESONIDE AND FORMOTEROL FUMARATE DIHYDRATE 2 PUFF(S): 160; 4.5 AEROSOL RESPIRATORY (INHALATION) at 07:02

## 2018-06-23 RX ADMIN — OXYCODONE HYDROCHLORIDE 5 MILLIGRAM(S): 5 TABLET ORAL at 23:00

## 2018-06-23 RX ADMIN — SENNA PLUS 2 TABLET(S): 8.6 TABLET ORAL at 22:28

## 2018-06-23 RX ADMIN — OXYCODONE HYDROCHLORIDE 5 MILLIGRAM(S): 5 TABLET ORAL at 10:50

## 2018-06-23 RX ADMIN — Medication 3 MILLILITER(S): at 07:02

## 2018-06-23 RX ADMIN — Medication 10 MILLIGRAM(S): at 07:01

## 2018-06-23 RX ADMIN — Medication 3 MILLILITER(S): at 17:50

## 2018-06-23 RX ADMIN — Medication 3 MILLILITER(S): at 00:02

## 2018-06-23 RX ADMIN — Medication 3 MILLILITER(S): at 13:16

## 2018-06-23 RX ADMIN — Medication 100 MILLIGRAM(S): at 07:02

## 2018-06-23 RX ADMIN — OXYCODONE HYDROCHLORIDE 5 MILLIGRAM(S): 5 TABLET ORAL at 15:28

## 2018-06-23 RX ADMIN — QUETIAPINE FUMARATE 50 MILLIGRAM(S): 200 TABLET, FILM COATED ORAL at 22:28

## 2018-06-23 RX ADMIN — Medication 2 MILLIGRAM(S): at 23:27

## 2018-06-23 RX ADMIN — MIRTAZAPINE 45 MILLIGRAM(S): 45 TABLET, ORALLY DISINTEGRATING ORAL at 22:28

## 2018-06-23 RX ADMIN — POLYETHYLENE GLYCOL 3350 17 GRAM(S): 17 POWDER, FOR SOLUTION ORAL at 13:17

## 2018-06-23 RX ADMIN — TIOTROPIUM BROMIDE 1 CAPSULE(S): 18 CAPSULE ORAL; RESPIRATORY (INHALATION) at 13:16

## 2018-06-23 RX ADMIN — FAMOTIDINE 20 MILLIGRAM(S): 10 INJECTION INTRAVENOUS at 17:50

## 2018-06-23 RX ADMIN — OXYCODONE HYDROCHLORIDE 5 MILLIGRAM(S): 5 TABLET ORAL at 11:20

## 2018-06-23 RX ADMIN — Medication 3 MILLILITER(S): at 23:27

## 2018-06-23 RX ADMIN — Medication 1 TABLET(S): at 13:15

## 2018-06-23 RX ADMIN — Medication 100 MILLIGRAM(S): at 14:58

## 2018-06-23 RX ADMIN — Medication 650 MILLIGRAM(S): at 03:35

## 2018-06-23 RX ADMIN — Medication 100 MILLIGRAM(S): at 03:17

## 2018-06-23 RX ADMIN — SERTRALINE 50 MILLIGRAM(S): 25 TABLET, FILM COATED ORAL at 07:01

## 2018-06-23 RX ADMIN — Medication 260 MILLIGRAM(S): at 03:16

## 2018-06-23 NOTE — PROGRESS NOTE ADULT - SUBJECTIVE AND OBJECTIVE BOX
PULMONARY PROGRESS NOTE    MYRIAM HEATH  MRN-[id number]    Patient is a 68y old  Female who presents with a chief complaint of Hip Fracture (22 Jun 2018 00:03)      HPI:  s/p R hemiarthroplasty.  denies sob/cough/wheeze.  pain controlled at the moment.     no N/V/D    MEDICATIONS  (STANDING):  ALBUTerol/ipratropium for Nebulization 3 milliLiter(s) Nebulizer every 6 hours  buDESOnide 160 MICROgram(s)/formoterol 4.5 MICROgram(s) Inhaler 2 Puff(s) Inhalation two times a day  calcium carbonate 1250 mG + Vitamin D (OsCal 500 + D) 1 Tablet(s) Oral daily  clonazePAM Tablet 2 milliGRAM(s) Oral at bedtime  docusate sodium 100 milliGRAM(s) Oral three times a day  enoxaparin Injectable 40 milliGRAM(s) SubCutaneous daily  famotidine    Tablet 20 milliGRAM(s) Oral two times a day  lactated ringers. 1000 milliLiter(s) (75 mL/Hr) IV Continuous <Continuous>  melatonin 3 milliGRAM(s) Oral at bedtime  mirtazapine 45 milliGRAM(s) Oral daily  polyethylene glycol 3350 17 Gram(s) Oral daily  predniSONE   Tablet 10 milliGRAM(s) Oral daily  QUEtiapine 50 milliGRAM(s) Oral at bedtime  senna 2 Tablet(s) Oral at bedtime  sertraline 50 milliGRAM(s) Oral daily  tiotropium 18 MICROgram(s) Capsule 1 Capsule(s) Inhalation daily    MEDICATIONS  (PRN):  acetaminophen   Tablet 650 milliGRAM(s) Oral every 6 hours PRN For Temp greater than 38 C (100.4 F)  acetaminophen   Tablet. 650 milliGRAM(s) Oral every 6 hours PRN Mild Pain (1 - 3)  aluminum hydroxide/magnesium hydroxide/simethicone Suspension 30 milliLiter(s) Oral four times a day PRN Indigestion  HYDROmorphone  Injectable 0.5 milliGRAM(s) IV Push every 10 minutes PRN Moderate Pain (4 - 6)  magnesium hydroxide Suspension 30 milliLiter(s) Oral daily PRN Constipation  morphine  - Injectable 2 milliGRAM(s) IV Push every 4 hours PRN breakthrough pain  ondansetron Injectable 4 milliGRAM(s) IV Push every 6 hours PRN Nausea and/or Vomiting  oxyCODONE    IR 2.5 milliGRAM(s) Oral every 4 hours PRN Moderate Pain (4 - 6)  oxyCODONE    IR 5 milliGRAM(s) Oral every 4 hours PRN Severe Pain (7 - 10)      EXAM:  Vital Signs Last 24 Hrs  T(C): 36.9 (23 Jun 2018 09:20), Max: 37.5 (23 Jun 2018 04:25)  T(F): 98.4 (23 Jun 2018 09:20), Max: 99.5 (23 Jun 2018 04:25)  HR: 104 (23 Jun 2018 10:14) (72 - 107)  BP: 138/77 (23 Jun 2018 10:14) (70/43 - 159/66)  BP(mean): 98 (22 Jun 2018 15:15) (82 - 98)  RR: 18 (23 Jun 2018 09:20) (16 - 18)  SpO2: 94% (23 Jun 2018 09:20) (93% - 100%)  GENERAL: The patient is awake and alert in no apparent distress.     SKIN: Warm, dry, no rashes    LUNGS: Clear to auscultation without wheezing, rales or rhonchi; respirations unlabored    HEART:S1/S2    ABDOMEN: +BS, Soft, Nontender      LABS/IMGAING: reviewed                        9.4    17.9  )-----------( 161      ( 23 Jun 2018 10:41 )             27.5   06-22    140  |  102  |  22  ----------------------------<  124<H>  4.6   |  25  |  0.78    Ca    7.5<L>      22 Jun 2018 14:56    TPro  6.3  /  Alb  4.0  /  TBili  0.2  /  DBili  x   /  AST  28  /  ALT  27  /  AlkPhos  78  06-21    < from: Xray Chest 1 View AP/PA (06.21.18 @ 22:33) >  IMPRESSION:   Clear lungs.    < end of copied text >    PROBLEM LIST:  68y Female with HEALTH ISSUES - PROBLEM Dx:  Hip Fracture  Asthma  Dementia    RECS:  -Asthma: not in exacerbation at this time, continue symbicort,  PRN duonebs Q6 hours.  -post op pain control, monitor mental status given hx of dementia  -Incentive neelima, PT.    Luciana Mancini MD  [phone]

## 2018-06-23 NOTE — PROVIDER CONTACT NOTE (OTHER) - ASSESSMENT
Pt w. previous c/o pain, received IV tylenol as ordered. Pt w. increased c/o pain; BP taken - 86/55. Pt asymptomatic for hypotension. Pt previously received 1unit PRBC at beginning of shift for low BP/ Low H/H.

## 2018-06-23 NOTE — PROVIDER CONTACT NOTE (OTHER) - ACTION/TREATMENT ORDERED:
MD Grey made aware, MD to order additional unit PRBC. Safety maint, will cont to monitor pt.
BRET Garcia made aware; NS 500ml bolus will be ordered; 1 unit PRBC will be ordered; safety maintained, will continue to monitor,

## 2018-06-23 NOTE — OCCUPATIONAL THERAPY INITIAL EVALUATION ADULT - BALANCE TRAINING, PT EVAL
Pt will increase dynamic standing balance 1/2 grade to increase safety/independence with functional transfers and ADLs within 2 weeks

## 2018-06-23 NOTE — OCCUPATIONAL THERAPY INITIAL EVALUATION ADULT - ADDITIONAL COMMENTS
pelvic xray 6/22-Status post right hip hemiarthroplasty, unchanged. Recent operative changes are noted.

## 2018-06-23 NOTE — OCCUPATIONAL THERAPY INITIAL EVALUATION ADULT - DIAGNOSIS, OT EVAL
Patient with decreased ADL status and impairments with functional mobility due to Patient with decreased ADL status and impairments with functional mobility due to decreased balance, ROM, and strength

## 2018-06-23 NOTE — OCCUPATIONAL THERAPY INITIAL EVALUATION ADULT - PERTINENT HX OF CURRENT PROBLEM, REHAB EVAL
68 year old female presented to the [facility] ED following atraumatic R hip pain. Patient was getting out of a car when she felt severe R hip pain and was unable to ambulate afterwards. She then presented to the [facility] ED for evaluation. Per family, she does not ambulate well at baseline secondary to medical comorbidities and knee pain.

## 2018-06-23 NOTE — OCCUPATIONAL THERAPY INITIAL EVALUATION ADULT - LIVES WITH, PROFILE
Pt lives in a pvt home with family, has 3 steps to enter with a handrail. I flight to bed and bath. Has a stall shower. Owns a shower chair, commode, transport w/c., rolling walker, and straight cane/children

## 2018-06-23 NOTE — PROGRESS NOTE ADULT - SUBJECTIVE AND OBJECTIVE BOX
ORTHO  Patient is a 68y old  Female who presents with a chief complaint of Hip Fracture (22 Jun 2018 00:03)    Pt. resting without complaint    VS-  T(C): 37 (06-23-18 @ 04:50), Max: 37.5 (06-23-18 @ 04:25)  HR: 99 (06-23-18 @ 04:50) (72 - 107)  BP: 92/56 (06-23-18 @ 04:50) (70/43 - 159/66)  RR: 18 (06-23-18 @ 04:50) (16 - 18)  SpO2: 93% (06-23-18 @ 04:50) (93% - 100%)  Wt(kg): --    M.S. Alert  Extremity- right hip dressing C/D/I  Neuro-              Motor- (+) Ankle DF/PF              Sensation- grossly intact to light touch              Calves- soft, nontender- PAS                               8.2    12.0  )-----------( 175      ( 22 Jun 2018 14:56 )             25.3     06-22    140  |  102  |  22  ----------------------------<  124<H>  4.6   |  25  |  0.78    Ca    7.5<L>      22 Jun 2018 14:56    TPro  6.3  /  Alb  4.0  /  TBili  0.2  /  DBili  x   /  AST  28  /  ALT  27  /  AlkPhos  78  06-21

## 2018-06-23 NOTE — PROGRESS NOTE ADULT - SUBJECTIVE AND OBJECTIVE BOX
Patient is a 68y old  Female who presents with a chief complaint of right Hip pain and x rays confirmed a  right femoral neck fracture. She underwent right hemiarthroplasty on 18 and is now post-op. She has moderate right hip pain and narcotics were held with post-op hypotension. BP is now averaging 130 systolic and she is receiving IV Dilaudid for pain control, as needed. At this time she is lethargic, but arousable.  Cognitive  function was already compromised by her baseline dementia. She received occupational therapy and is awaiting onset of physical therapy. She has no other new medical complaints at this time.    MEDICATIONS  (STANDING):  ALBUTerol/ipratropium for Nebulization 3 milliLiter(s) Nebulizer every 6 hours  buDESOnide 160 MICROgram(s)/formoterol 4.5 MICROgram(s) Inhaler 2 Puff(s) Inhalation two times a day  calcium carbonate 1250 mG + Vitamin D (OsCal 500 + D) 1 Tablet(s) Oral daily  clonazePAM Tablet 2 milliGRAM(s) Oral at bedtime  docusate sodium 100 milliGRAM(s) Oral three times a day  enoxaparin Injectable 40 milliGRAM(s) SubCutaneous daily  famotidine    Tablet 20 milliGRAM(s) Oral two times a day  lactated ringers. 1000 milliLiter(s) (75 mL/Hr) IV Continuous <Continuous>  melatonin 3 milliGRAM(s) Oral at bedtime  mirtazapine 45 milliGRAM(s) Oral daily  polyethylene glycol 3350 17 Gram(s) Oral daily  predniSONE   Tablet 10 milliGRAM(s) Oral daily  QUEtiapine 50 milliGRAM(s) Oral at bedtime  senna 2 Tablet(s) Oral at bedtime  sertraline 50 milliGRAM(s) Oral daily  tiotropium 18 MICROgram(s) Capsule 1 Capsule(s) Inhalation daily    MEDICATIONS  (PRN):  acetaminophen   Tablet 650 milliGRAM(s) Oral every 6 hours PRN For Temp greater than 38 C (100.4 F)  acetaminophen   Tablet. 650 milliGRAM(s) Oral every 6 hours PRN Mild Pain (1 - 3)  aluminum hydroxide/magnesium hydroxide/simethicone Suspension 30 milliLiter(s) Oral four times a day PRN Indigestion  HYDROmorphone  Injectable 0.5 milliGRAM(s) IV Push every 10 minutes PRN Moderate Pain (4 - 6)  magnesium hydroxide Suspension 30 milliLiter(s) Oral daily PRN Constipation  morphine  - Injectable 2 milliGRAM(s) IV Push every 4 hours PRN breakthrough pain  ondansetron Injectable 4 milliGRAM(s) IV Push every 6 hours PRN Nausea and/or Vomiting  oxyCODONE    IR 2.5 milliGRAM(s) Oral every 4 hours PRN Moderate Pain (4 - 6)  oxyCODONE    IR 5 milliGRAM(s) Oral every 4 hours PRN Severe Pain (7 - 10)          Allergies    ASA; dye contrast (Anaphylaxis)  aspirin (Short breath)  divalproex sodium (Other (Unknown))  Haldol (Other (Unknown))  penicillin (Short breath; Rash)  sulfa drugs (Short breath; Rash)  Xanax (Other (Unknown))    Intolerances    Vital Signs Last 24 Hrs  T(C): 36.8 (2018 14:46), Max: 37.5 (2018 04:25)  T(F): 98.2 (2018 14:46), Max: 99.5 (2018 04:25)  HR: 100 (2018 15:26) (72 - 107)  BP: 130/76 (2018 15:26) (70/43 - 138/77)  BP(mean): --  RR: 18 (2018 14:46) (18 - 18)  SpO2: 95% (2018 14:46) (93% - 98%)    PHYSICAL EXAM:  GENERAL: NAD, well nourished and conversant  HEAD:  Atraumatic  EYES: EOM, PERRLA, conjunctiva pink and sclera white  ENT: No tonsillar erythema, exudates, or enlargement, moist mucous membranes, good dentition, no lesions  NECK: Supple, No JVD, normal thyroid, carotids with normal upstrokes and no bruits  CHEST/LUNG: Clear to auscultation bilaterally, No rales, rhonchi, wheezing, or rubs  HEART: Regular rate and rhythm, No murmurs, rubs, or gallops  ABDOMEN: Soft, nondistended, no masses, guarding, tenderness or rebound, bowel sounds present  EXTREMITIES:  2+ Peripheral Pulses. 3+ right hip swelling and 3+ tenderness to touch. No erythema or incisional drainage.  LYMPH: No lymphadenopathy noted  SKIN: No rashes or lesions  NERVOUS SYSTEM:  Alert & Oriented X3, normal cognitive function. Motor Strength 5/5 right upper and right lower.  5/5 left upper and left lower extremities, DTRs 2+ intact and symmetric    LABS:           CBC Full  -  ( 2018 10:41 )  WBC Count : 17.9 K/uL  Hemoglobin : 9.4 g/dL  Hematocrit : 27.5 %  Platelet Count - Automated : 161 K/uL  Mean Cell Volume : 82.2 fl  Mean Cell Hemoglobin : 27.9 pg  Mean Cell Hemoglobin Concentration : 34.0 gm/dL  Auto Neutrophil # : x  Auto Lymphocyte # : x  Auto Monocyte # : x  Auto Eosinophil # : x  Auto Basophil # : x  Auto Neutrophil % : x  Auto Lymphocyte % : x  Auto Monocyte % : x  Auto Eosinophil % : x  Auto Basophil % : x    -    140  |  102  |  22  ----------------------------<  124<H>  4.6   |  25  |  0.78    Ca    7.5<L>      2018 14:56    TPro  6.3  /  Alb  4.0  /  TBili  0.2  /  DBili  x   /  AST  28  /  ALT  27  /  AlkPhos  78  06-21    LIVER FUNCTIONS - ( 2018 20:28 )  Alb: 4.0 g/dL / Pro: 6.3 g/dL / ALK PHOS: 78 U/L / ALT: 27 U/L / AST: 28 U/L / GGT: x           PT/INR - ( 2018 05:03 )   PT: 11.4 sec;   INR: 1.05 ratio         PTT - ( 2018 05:03 )  PTT:29.7 sec  Urinalysis Basic - ( 2018 22:03 )    Color: PL Yellow / Appearance: Clear / S.010 / pH: x  Gluc: x / Ketone: Negative  / Bili: Negative / Urobili: Negative   Blood: x / Protein: Negative / Nitrite: Negative   Leuk Esterase: Negative / RBC: x / WBC 0-2 /HPF   Sq Epi: x / Non Sq Epi: x / Bacteria: x

## 2018-06-24 ENCOUNTER — TRANSCRIPTION ENCOUNTER (OUTPATIENT)
Age: 68
End: 2018-06-24

## 2018-06-24 LAB
ANION GAP SERPL CALC-SCNC: 11 MMOL/L — SIGNIFICANT CHANGE UP (ref 5–17)
BLD GP AB SCN SERPL QL: NEGATIVE — SIGNIFICANT CHANGE UP
BUN SERPL-MCNC: 16 MG/DL — SIGNIFICANT CHANGE UP (ref 7–23)
CALCIUM SERPL-MCNC: 8.1 MG/DL — LOW (ref 8.4–10.5)
CHLORIDE SERPL-SCNC: 100 MMOL/L — SIGNIFICANT CHANGE UP (ref 96–108)
CO2 SERPL-SCNC: 26 MMOL/L — SIGNIFICANT CHANGE UP (ref 22–31)
CREAT SERPL-MCNC: 0.82 MG/DL — SIGNIFICANT CHANGE UP (ref 0.5–1.3)
GLUCOSE SERPL-MCNC: 135 MG/DL — HIGH (ref 70–99)
HCT VFR BLD CALC: 23.7 % — LOW (ref 34.5–45)
HGB BLD-MCNC: 7.7 G/DL — LOW (ref 11.5–15.5)
MCHC RBC-ENTMCNC: 26 PG — LOW (ref 27–34)
MCHC RBC-ENTMCNC: 32.5 GM/DL — SIGNIFICANT CHANGE UP (ref 32–36)
MCV RBC AUTO: 80.1 FL — SIGNIFICANT CHANGE UP (ref 80–100)
PLATELET # BLD AUTO: 159 K/UL — SIGNIFICANT CHANGE UP (ref 150–400)
POTASSIUM SERPL-MCNC: 3.7 MMOL/L — SIGNIFICANT CHANGE UP (ref 3.5–5.3)
POTASSIUM SERPL-SCNC: 3.7 MMOL/L — SIGNIFICANT CHANGE UP (ref 3.5–5.3)
RBC # BLD: 2.96 M/UL — LOW (ref 3.8–5.2)
RBC # FLD: 17.6 % — HIGH (ref 10.3–14.5)
RH IG SCN BLD-IMP: NEGATIVE — SIGNIFICANT CHANGE UP
SODIUM SERPL-SCNC: 137 MMOL/L — SIGNIFICANT CHANGE UP (ref 135–145)
WBC # BLD: 11.42 K/UL — HIGH (ref 3.8–10.5)
WBC # FLD AUTO: 11.42 K/UL — HIGH (ref 3.8–10.5)

## 2018-06-24 RX ORDER — ACETAMINOPHEN 500 MG
750 TABLET ORAL ONCE
Qty: 0 | Refills: 0 | Status: COMPLETED | OUTPATIENT
Start: 2018-06-24 | End: 2018-06-24

## 2018-06-24 RX ORDER — ACETAMINOPHEN 500 MG
1000 TABLET ORAL ONCE
Qty: 0 | Refills: 0 | Status: COMPLETED | OUTPATIENT
Start: 2018-06-24 | End: 2018-06-24

## 2018-06-24 RX ADMIN — Medication 750 MILLIGRAM(S): at 23:15

## 2018-06-24 RX ADMIN — ENOXAPARIN SODIUM 40 MILLIGRAM(S): 100 INJECTION SUBCUTANEOUS at 12:58

## 2018-06-24 RX ADMIN — BUDESONIDE AND FORMOTEROL FUMARATE DIHYDRATE 2 PUFF(S): 160; 4.5 AEROSOL RESPIRATORY (INHALATION) at 18:01

## 2018-06-24 RX ADMIN — TIOTROPIUM BROMIDE 1 CAPSULE(S): 18 CAPSULE ORAL; RESPIRATORY (INHALATION) at 12:58

## 2018-06-24 RX ADMIN — OXYCODONE HYDROCHLORIDE 5 MILLIGRAM(S): 5 TABLET ORAL at 07:10

## 2018-06-24 RX ADMIN — Medication 3 MILLIGRAM(S): at 22:52

## 2018-06-24 RX ADMIN — Medication 2 MILLIGRAM(S): at 22:52

## 2018-06-24 RX ADMIN — BUDESONIDE AND FORMOTEROL FUMARATE DIHYDRATE 2 PUFF(S): 160; 4.5 AEROSOL RESPIRATORY (INHALATION) at 06:44

## 2018-06-24 RX ADMIN — Medication 3 MILLILITER(S): at 18:01

## 2018-06-24 RX ADMIN — FAMOTIDINE 20 MILLIGRAM(S): 10 INJECTION INTRAVENOUS at 18:02

## 2018-06-24 RX ADMIN — MIRTAZAPINE 45 MILLIGRAM(S): 45 TABLET, ORALLY DISINTEGRATING ORAL at 22:51

## 2018-06-24 RX ADMIN — OXYCODONE HYDROCHLORIDE 5 MILLIGRAM(S): 5 TABLET ORAL at 18:32

## 2018-06-24 RX ADMIN — OXYCODONE HYDROCHLORIDE 5 MILLIGRAM(S): 5 TABLET ORAL at 03:20

## 2018-06-24 RX ADMIN — Medication 1000 MILLIGRAM(S): at 07:10

## 2018-06-24 RX ADMIN — SENNA PLUS 2 TABLET(S): 8.6 TABLET ORAL at 22:51

## 2018-06-24 RX ADMIN — SERTRALINE 50 MILLIGRAM(S): 25 TABLET, FILM COATED ORAL at 06:46

## 2018-06-24 RX ADMIN — QUETIAPINE FUMARATE 50 MILLIGRAM(S): 200 TABLET, FILM COATED ORAL at 22:52

## 2018-06-24 RX ADMIN — Medication 3 MILLILITER(S): at 12:58

## 2018-06-24 RX ADMIN — Medication 3 MILLILITER(S): at 06:45

## 2018-06-24 RX ADMIN — Medication 1 TABLET(S): at 12:58

## 2018-06-24 RX ADMIN — Medication 10 MILLIGRAM(S): at 06:46

## 2018-06-24 RX ADMIN — Medication 300 MILLIGRAM(S): at 22:55

## 2018-06-24 RX ADMIN — OXYCODONE HYDROCHLORIDE 5 MILLIGRAM(S): 5 TABLET ORAL at 06:45

## 2018-06-24 RX ADMIN — OXYCODONE HYDROCHLORIDE 5 MILLIGRAM(S): 5 TABLET ORAL at 02:50

## 2018-06-24 RX ADMIN — Medication 100 MILLIGRAM(S): at 06:46

## 2018-06-24 RX ADMIN — OXYCODONE HYDROCHLORIDE 5 MILLIGRAM(S): 5 TABLET ORAL at 23:30

## 2018-06-24 RX ADMIN — Medication 100 MILLIGRAM(S): at 14:29

## 2018-06-24 RX ADMIN — Medication 400 MILLIGRAM(S): at 06:46

## 2018-06-24 RX ADMIN — Medication 300 MILLIGRAM(S): at 14:28

## 2018-06-24 RX ADMIN — POLYETHYLENE GLYCOL 3350 17 GRAM(S): 17 POWDER, FOR SOLUTION ORAL at 12:58

## 2018-06-24 RX ADMIN — Medication 100 MILLIGRAM(S): at 22:52

## 2018-06-24 RX ADMIN — OXYCODONE HYDROCHLORIDE 5 MILLIGRAM(S): 5 TABLET ORAL at 22:52

## 2018-06-24 RX ADMIN — OXYCODONE HYDROCHLORIDE 5 MILLIGRAM(S): 5 TABLET ORAL at 18:02

## 2018-06-24 RX ADMIN — FAMOTIDINE 20 MILLIGRAM(S): 10 INJECTION INTRAVENOUS at 06:46

## 2018-06-24 NOTE — DISCHARGE NOTE ADULT - NS AS ACTIVITY OBS
No Heavy lifting/straining/Walking-Indoors allowed/Do not make important decisions/Do not drive or operate machinery/Stairs allowed/Showering allowed/Walking-Outdoors allowed Do not make important decisions/Do not drive or operate machinery/Showering allowed/Walking-Indoors allowed/Walking-Outdoors allowed/Stairs allowed/No Heavy lifting/straining/continue weight bearing as tolerated ambulation, maintain posterior total hip precautions

## 2018-06-24 NOTE — DISCHARGE NOTE ADULT - HOSPITAL COURSE
History of Present Illness:  Reason for Admission: Hip Fracture	  History of Present Illness: 	  68 year old female presented to the [facility] ED following atraumatic R hip pain. Patient was getting out of a car when she felt severe R hip pain and was unable to ambulate afterwards. She then presented to the [facility] ED for evaluation. No recent fevers/chills. No recent weight loss. Patient has a history of R TKA by Dr. Duque in 2013. Per family, she does not ambulate well at baseline secondary to medical comorbidities and knee pain. Patient has a history of severe asthma (requiring chronic prednisone), treated by Dr. Francis.     Review of Systems:  Other Review of Systems: All other review of systems negative, except as noted in HPI	      Allergies and Intolerances:        Allergies:  	ASA; dye contrast: Miscellaneous, Anaphylaxis, ASA, dye contrast  	penicillin: Drug, Short breath, Rash  	aspirin: Drug, Short breath  	sulfa drugs: Drug Category, Short breath, Rash    Home Medications:   * Patient Currently Takes Medications as of 23-Mar-2018 16:07 documented in Structured Notes  · 	bisacodyl 10 mg rectal suppository: 1 suppository(ies) rectal once a day, As needed, Constipation  · 	famotidine 20 mg oral tablet: 1 tab(s) orally once a day  · 	clopidogrel 75 mg oral tablet: 1 tab(s) orally once a day  · 	Advair Diskus 500 mcg-50 mcg inhalation powder: 2 puff(s) inhaled 2 times a day  · 	predniSONE 10 mg oral tablet: 1 tab(s) orally once a day  · 	KlonoPIN 0.5 mg oral tablet: 1 tab(s) orally once a day in the morning  · 	Spiriva 18 mcg inhalation capsule: 1 cap(s) inhaled once a day  · 	KlonoPIN 0.5 mg oral tablet: 2 tab(s) orally once a day at night    Patient History:    Past Medical History:  Acid reflux    Anxiety    Asthma    CVA (cerebral vascular accident)    Depression    Fatty pancreas    HLD (hyperlipidemia)    IGT (impaired glucose tolerance)    Mouth sores    PNA (pneumonia)    Pulmonary HTN    Ulcerative colitis.     Past Surgical History:  H/O cataract extraction, left    H/O: hysterectomy    History of knee replacement    Humeral head fracture. History of Present Illness:  Reason for Admission: Hip Fracture	  History of Present Illness: 	  68 year old female presented to the [facility] ED following atraumatic R hip pain. Patient was getting out of a car when she felt severe R hip pain and was unable to ambulate afterwards. She then presented to the [facility] ED for evaluation. No recent fevers/chills. No recent weight loss. Patient has a history of R TKA by Dr. Duque in 2013. Per family, she does not ambulate well at baseline secondary to medical comorbidities and knee pain. Patient has a history of severe asthma (requiring chronic prednisone), treated by Dr. Francis.     Review of Systems:  Other Review of Systems: All other review of systems negative, except as noted in HPI	      Allergies and Intolerances:        Allergies:  	ASA; dye contrast: Miscellaneous, Anaphylaxis, ASA, dye contrast  	penicillin: Drug, Short breath, Rash  	aspirin: Drug, Short breath  	sulfa drugs: Drug Category, Short breath, Rash    Home Medications:   * Patient Currently Takes Medications as of 23-Mar-2018 16:07 documented in Structured Notes  · 	bisacodyl 10 mg rectal suppository: 1 suppository(ies) rectal once a day, As needed, Constipation  · 	famotidine 20 mg oral tablet: 1 tab(s) orally once a day  · 	clopidogrel 75 mg oral tablet: 1 tab(s) orally once a day  · 	Advair Diskus 500 mcg-50 mcg inhalation powder: 2 puff(s) inhaled 2 times a day  · 	predniSONE 10 mg oral tablet: 1 tab(s) orally once a day  · 	KlonoPIN 0.5 mg oral tablet: 1 tab(s) orally once a day in the morning  · 	Spiriva 18 mcg inhalation capsule: 1 cap(s) inhaled once a day  · 	KlonoPIN 0.5 mg oral tablet: 2 tab(s) orally once a day at night    Patient History:    Past Medical History:  Acid reflux    Anxiety    Asthma    CVA (cerebral vascular accident)    Depression    Fatty pancreas    HLD (hyperlipidemia)    IGT (impaired glucose tolerance)    Mouth sores    PNA (pneumonia)    Pulmonary HTN    Ulcerative colitis.     Past Surgical History:  H/O cataract extraction, left    H/O: hysterectomy    History of knee replacement    Humeral head fracture.      Hospital Course:  6/21/18: Patient presented to [facility] [place] Emergency Room for Right Hip Pain. In the Emergency Room found to have a Right Femoral Neck fracture in need of surgical fixation. Patient seen by Medicine Team and Pulmonary Team and cleared for surgery.   6/22/18: Transfused 1 unit of Packed Red Blood Cells Preoperatively. Underwent Right Hip Hemiarthroplasty without complications. Received second 1 unit of Packed Red Blood Cells postoperatively.  6/23/18: Transfused 1 unit of Packed Red Blood Cells. Transferred Out of Bed to Chair by Physical Therapy.   6/24/18: Transfused 1 unit of Packed Red Blood Cells (Fourth unit to date). History of Present Illness:  Reason for Admission: Hip Fracture	  History of Present Illness: 	  68 year old female presented to the [facility] ED following atraumatic R hip pain. Patient was getting out of a car when she felt severe R hip pain and was unable to ambulate afterwards. She then presented to the [facility] ED for evaluation. No recent fevers/chills. No recent weight loss. Patient has a history of R TKA by Dr. Duque in 2013. Per family, she does not ambulate well at baseline secondary to medical comorbidities and knee pain. Patient has a history of severe asthma (requiring chronic prednisone), treated by Dr. Francis.     Review of Systems:  Other Review of Systems: All other review of systems negative, except as noted in HPI	      Allergies and Intolerances:        Allergies:  	ASA; dye contrast: Miscellaneous, Anaphylaxis, ASA, dye contrast  	penicillin: Drug, Short breath, Rash  	aspirin: Drug, Short breath  	sulfa drugs: Drug Category, Short breath, Rash    Home Medications:   * Patient Currently Takes Medications as of 23-Mar-2018 16:07 documented in Structured Notes  · 	bisacodyl 10 mg rectal suppository: 1 suppository(ies) rectal once a day, As needed, Constipation  · 	famotidine 20 mg oral tablet: 1 tab(s) orally once a day  · 	clopidogrel 75 mg oral tablet: 1 tab(s) orally once a day  · 	Advair Diskus 500 mcg-50 mcg inhalation powder: 2 puff(s) inhaled 2 times a day  · 	predniSONE 10 mg oral tablet: 1 tab(s) orally once a day  · 	KlonoPIN 0.5 mg oral tablet: 1 tab(s) orally once a day in the morning  · 	Spiriva 18 mcg inhalation capsule: 1 cap(s) inhaled once a day  · 	KlonoPIN 0.5 mg oral tablet: 2 tab(s) orally once a day at night    Patient History:    Past Medical History:  Acid reflux    Anxiety    Asthma    CVA (cerebral vascular accident)    Depression    Fatty pancreas    HLD (hyperlipidemia)    IGT (impaired glucose tolerance)    Mouth sores    PNA (pneumonia)    Pulmonary HTN    Ulcerative colitis.     Past Surgical History:  H/O cataract extraction, left    H/O: hysterectomy    History of knee replacement    Humeral head fracture.      Hospital Course:  6/21/18: Patient presented to [facility] Emergency Room for Right Hip Pain. In the Emergency Room found to have a Right Femoral Neck fracture in need of surgical fixation. Patient seen by Medicine Team and Pulmonary Team and cleared for surgery.   6/22/18: Transfused 1 unit of Packed Red Blood Cells Preoperatively. Underwent Right Hip Hemiarthroplasty without complications. Received second 1 unit of Packed Red Blood Cells postoperatively. Patient was evaluated by physical therapist for weight bearing as tolerated ambulation, and advised that patient would benefit from admission to rehab facility.  6/23/18: Transfused 1 unit of Packed Red Blood Cells. Transferred Out of Bed to Chair by Physical Therapy.   6/24/18: Transfused 1 unit of Packed Red Blood Cells (Fourth unit to date). History of Present Illness:  Reason for Admission: Hip Fracture	  History of Present Illness: 	  68 year old female presented to the [facility] ED following atraumatic R hip pain. Patient was getting out of a car when she felt severe R hip pain and was unable to ambulate afterwards. She then presented to the [facility] ED for evaluation. No recent fevers/chills. No recent weight loss. Patient has a history of R TKA by Dr. Duque in 2013. Per family, she does not ambulate well at baseline secondary to medical comorbidities and knee pain. Patient has a history of severe asthma (requiring chronic prednisone), treated by Dr. Francis.     Review of Systems:  Other Review of Systems: All other review of systems negative, except as noted in HPI	      Allergies and Intolerances:        Allergies:  	ASA; dye contrast: Miscellaneous, Anaphylaxis, ASA, dye contrast  	penicillin: Drug, Short breath, Rash  	aspirin: Drug, Short breath  	sulfa drugs: Drug Category, Short breath, Rash    Home Medications:   * Patient Currently Takes Medications as of 23-Mar-2018 16:07 documented in Structured Notes  · 	bisacodyl 10 mg rectal suppository: 1 suppository(ies) rectal once a day, As needed, Constipation  · 	famotidine 20 mg oral tablet: 1 tab(s) orally once a day  · 	clopidogrel 75 mg oral tablet: 1 tab(s) orally once a day  · 	Advair Diskus 500 mcg-50 mcg inhalation powder: 2 puff(s) inhaled 2 times a day  · 	predniSONE 10 mg oral tablet: 1 tab(s) orally once a day  · 	KlonoPIN 0.5 mg oral tablet: 1 tab(s) orally once a day in the morning  · 	Spiriva 18 mcg inhalation capsule: 1 cap(s) inhaled once a day  · 	KlonoPIN 0.5 mg oral tablet: 2 tab(s) orally once a day at night    Patient History:    Past Medical History:  Acid reflux    Anxiety    Asthma    CVA (cerebral vascular accident)    Depression    Fatty pancreas    HLD (hyperlipidemia)    IGT (impaired glucose tolerance)    Mouth sores    PNA (pneumonia)    Pulmonary HTN    Ulcerative colitis.     Past Surgical History:  H/O cataract extraction, left    H/O: hysterectomy    History of knee replacement    Humeral head fracture.      Hospital Course:  6/21/18: Patient presented to [facility] Emergency Room for Right Hip Pain. In the Emergency Room found to have a Right Femoral Neck fracture in need of surgical fixation. Patient seen by Medicine Team and Pulmonary Team and cleared for surgery.   6/22/18: Transfused 1 unit of Packed Red Blood Cells Preoperatively. Underwent Right Hip Hemiarthroplasty without complications. Received second 1 unit of Packed Red Blood Cells postoperatively. Patient was evaluated by physical therapist for weight bearing as tolerated ambulation, and advised that patient would benefit from admission to rehab facility.  6/23/18: Transfused 1 unit of Packed Red Blood Cells. Transferred Out of Bed to Chair by Physical Therapy.   6/24/18: Transfused 1 unit of Packed Red Blood Cells (Fourth unit to date), for acute blood loss anemia.  6/25/18: Patient ready for discharge to rehab facility History of Present Illness:  Reason for Admission: Hip Fracture	  History of Present Illness: 	  68 year old female presented to the [facility] ED following atraumatic R hip pain. Patient was getting out of a car when she felt severe R hip pain and was unable to ambulate afterwards. She then presented to the [facility] ED for evaluation. No recent fevers/chills. No recent weight loss. Patient has a history of R TKA by Dr. Duque in 2013. Per family, she does not ambulate well at baseline secondary to medical comorbidities and knee pain. Patient has a history of severe asthma (requiring chronic prednisone), treated by Dr. Francis.     Review of Systems:  Other Review of Systems: All other review of systems negative, except as noted in HPI	      Allergies and Intolerances:        Allergies:  	ASA; dye contrast: Miscellaneous, Anaphylaxis, ASA, dye contrast  	penicillin: Drug, Short breath, Rash  	aspirin: Drug, Short breath  	sulfa drugs: Drug Category, Short breath, Rash    Home Medications:   * Patient Currently Takes Medications as of 23-Mar-2018 16:07 documented in Structured Notes  · 	bisacodyl 10 mg rectal suppository: 1 suppository(ies) rectal once a day, As needed, Constipation  · 	famotidine 20 mg oral tablet: 1 tab(s) orally once a day  · 	clopidogrel 75 mg oral tablet: 1 tab(s) orally once a day  · 	Advair Diskus 500 mcg-50 mcg inhalation powder: 2 puff(s) inhaled 2 times a day  · 	predniSONE 10 mg oral tablet: 1 tab(s) orally once a day  · 	KlonoPIN 0.5 mg oral tablet: 1 tab(s) orally once a day in the morning  · 	Spiriva 18 mcg inhalation capsule: 1 cap(s) inhaled once a day  · 	KlonoPIN 0.5 mg oral tablet: 2 tab(s) orally once a day at night    Patient History:    Past Medical History:  Acid reflux    Anxiety    Asthma    CVA (cerebral vascular accident)    Depression    Fatty pancreas    HLD (hyperlipidemia)    IGT (impaired glucose tolerance)    Mouth sores    PNA (pneumonia)    Pulmonary HTN    Ulcerative colitis.     Past Surgical History:  H/O cataract extraction, left    H/O: hysterectomy    History of knee replacement    Humeral head fracture.      Hospital Course:  6/21/18: Patient presented to [facility] Emergency Room for Right Hip Pain. In the Emergency Room found to have a Right Femoral Neck fracture in need of surgical fixation. Patient seen by Medicine Team and Pulmonary Team and cleared for surgery.   6/22/18: Transfused 1 unit of Packed Red Blood Cells Preoperatively. Underwent Right Hip Hemiarthroplasty without complications. Received second 1 unit of Packed Red Blood Cells postoperatively. Patient was evaluated by physical therapist for weight bearing as tolerated ambulation, and advised that patient would benefit from admission to rehab facility, however patients family wishes to take patient home.  6/23/18: Transfused 1 unit of Packed Red Blood Cells. Transferred Out of Bed to Chair by Physical Therapy.   6/24/18: Transfused 1 unit of Packed Red Blood Cells (Fourth unit to date), for acute blood loss anemia.  6/25/18: Patient ready for discharge home

## 2018-06-24 NOTE — PROGRESS NOTE ADULT - SUBJECTIVE AND OBJECTIVE BOX
PULMONARY PROGRESS NOTE    MYRIAM HEATH  MRN-[id number]    Patient is a 68y old  Female who presents with a chief complaint of Hip Fracture (22 Jun 2018 00:03)      HPI:  s/p R hemiarthroplasty.  denies sob/cough/wheeze.  pain controlled.    no N/V/D    MEDICATIONS  (STANDING):  acetaminophen  IVPB. 750 milliGRAM(s) IV Intermittent once  acetaminophen  IVPB. 750 milliGRAM(s) IV Intermittent once  ALBUTerol/ipratropium for Nebulization 3 milliLiter(s) Nebulizer every 6 hours  buDESOnide 160 MICROgram(s)/formoterol 4.5 MICROgram(s) Inhaler 2 Puff(s) Inhalation two times a day  calcium carbonate 1250 mG + Vitamin D (OsCal 500 + D) 1 Tablet(s) Oral daily  clonazePAM Tablet 2 milliGRAM(s) Oral at bedtime  docusate sodium 100 milliGRAM(s) Oral three times a day  enoxaparin Injectable 40 milliGRAM(s) SubCutaneous daily  famotidine    Tablet 20 milliGRAM(s) Oral two times a day  lactated ringers. 1000 milliLiter(s) (75 mL/Hr) IV Continuous <Continuous>  melatonin 3 milliGRAM(s) Oral at bedtime  mirtazapine 45 milliGRAM(s) Oral daily  polyethylene glycol 3350 17 Gram(s) Oral daily  predniSONE   Tablet 10 milliGRAM(s) Oral daily  QUEtiapine 50 milliGRAM(s) Oral at bedtime  senna 2 Tablet(s) Oral at bedtime  sertraline 50 milliGRAM(s) Oral daily  tiotropium 18 MICROgram(s) Capsule 1 Capsule(s) Inhalation daily    MEDICATIONS  (PRN):  acetaminophen   Tablet 650 milliGRAM(s) Oral every 6 hours PRN For Temp greater than 38 C (100.4 F)  acetaminophen   Tablet. 650 milliGRAM(s) Oral every 6 hours PRN Mild Pain (1 - 3)  aluminum hydroxide/magnesium hydroxide/simethicone Suspension 30 milliLiter(s) Oral four times a day PRN Indigestion  bisacodyl Suppository 10 milliGRAM(s) Rectal daily PRN If no bowel movement by POD#2  HYDROmorphone  Injectable 0.5 milliGRAM(s) IV Push every 10 minutes PRN Moderate Pain (4 - 6)  magnesium hydroxide Suspension 30 milliLiter(s) Oral daily PRN Constipation  morphine  - Injectable 2 milliGRAM(s) IV Push every 4 hours PRN breakthrough pain  ondansetron Injectable 4 milliGRAM(s) IV Push every 6 hours PRN Nausea and/or Vomiting  oxyCODONE    IR 2.5 milliGRAM(s) Oral every 4 hours PRN Moderate Pain (4 - 6)  oxyCODONE    IR 5 milliGRAM(s) Oral every 4 hours PRN Severe Pain (7 - 10)      EXAM:  Vital Signs Last 24 Hrs  T(C): 36.8 (24 Jun 2018 08:00), Max: 37.7 (23 Jun 2018 22:15)  T(F): 98.2 (24 Jun 2018 08:00), Max: 99.9 (23 Jun 2018 22:15)  HR: 96 (24 Jun 2018 08:00) (83 - 102)  BP: 122/56 (24 Jun 2018 08:00) (122/56 - 140/70)  BP(mean): --  RR: 18 (24 Jun 2018 08:00) (18 - 18)  SpO2: 95% (24 Jun 2018 08:00) (94% - 97%)  GENERAL: The patient is awake and alert in no apparent distress.     SKIN: Warm, dry, no rashes    LUNGS: Clear to auscultation without wheezing, rales or rhonchi; respirations unlabored    HEART:S1/S2    ABDOMEN: +BS, Soft, Nontender      LABS/IMGAING: reviewed                                   7.7    11.42 )-----------( 159      ( 24 Jun 2018 08:20 )             23.7   06-24    137  |  100  |  16  ----------------------------<  135<H>  3.7   |  26  |  0.82    Ca    8.1<L>      24 Jun 2018 07:08        < from: Xray Chest 1 View AP/PA (06.21.18 @ 22:33) >  IMPRESSION:   Clear lungs.    < end of copied text >    PROBLEM LIST:  68y Female with HEALTH ISSUES - PROBLEM Dx:  Hip Fracture  Asthma  Dementia    RECS:  -Asthma: not in exacerbation at this time, continue symbicort,  PRN duonebs Q6 hours.  -post op pain control, monitor mental status given hx of dementia  -Incentive ICARAN ortez Daglian, MD  [phone]

## 2018-06-24 NOTE — PROGRESS NOTE ADULT - SUBJECTIVE AND OBJECTIVE BOX
Patient is a 68y old  Female who presents with a chief complaint of right Hip pain and x rays confirmed a  right femoral neck fracture. She underwent right hemiarthroplasty on 06/22/18 and is now post-op. She has moderate right hip pain and narcotics were held with post-op hypotension. BP is now averaging 130 systolic and she is receiving IV Dilaudid for pain control, as needed. At this time she is off narcotics and is very awake and responsive. Cognitive  function is compromised by her baseline dementia. She received occupational therapy and physical therapy and is beginning to ambulate. She has no other new medical complaints at this time.    MEDICATIONS  (STANDING):  acetaminophen  IVPB. 750 milliGRAM(s) IV Intermittent once  ALBUTerol/ipratropium for Nebulization 3 milliLiter(s) Nebulizer every 6 hours  buDESOnide 160 MICROgram(s)/formoterol 4.5 MICROgram(s) Inhaler 2 Puff(s) Inhalation two times a day  calcium carbonate 1250 mG + Vitamin D (OsCal 500 + D) 1 Tablet(s) Oral daily  clonazePAM Tablet 2 milliGRAM(s) Oral at bedtime  docusate sodium 100 milliGRAM(s) Oral three times a day  enoxaparin Injectable 40 milliGRAM(s) SubCutaneous daily  famotidine    Tablet 20 milliGRAM(s) Oral two times a day  lactated ringers. 1000 milliLiter(s) (75 mL/Hr) IV Continuous <Continuous>  melatonin 3 milliGRAM(s) Oral at bedtime  mirtazapine 45 milliGRAM(s) Oral daily  polyethylene glycol 3350 17 Gram(s) Oral daily  predniSONE   Tablet 10 milliGRAM(s) Oral daily  QUEtiapine 50 milliGRAM(s) Oral at bedtime  senna 2 Tablet(s) Oral at bedtime  sertraline 50 milliGRAM(s) Oral daily  tiotropium 18 MICROgram(s) Capsule 1 Capsule(s) Inhalation daily    MEDICATIONS  (PRN):  acetaminophen   Tablet 650 milliGRAM(s) Oral every 6 hours PRN For Temp greater than 38 C (100.4 F)  acetaminophen   Tablet. 650 milliGRAM(s) Oral every 6 hours PRN Mild Pain (1 - 3)  aluminum hydroxide/magnesium hydroxide/simethicone Suspension 30 milliLiter(s) Oral four times a day PRN Indigestion  bisacodyl Suppository 10 milliGRAM(s) Rectal daily PRN If no bowel movement by POD#2  HYDROmorphone  Injectable 0.5 milliGRAM(s) IV Push every 10 minutes PRN Moderate Pain (4 - 6)  magnesium hydroxide Suspension 30 milliLiter(s) Oral daily PRN Constipation  morphine  - Injectable 2 milliGRAM(s) IV Push every 4 hours PRN breakthrough pain  ondansetron Injectable 4 milliGRAM(s) IV Push every 6 hours PRN Nausea and/or Vomiting  oxyCODONE    IR 2.5 milliGRAM(s) Oral every 4 hours PRN Moderate Pain (4 - 6)  oxyCODONE    IR 5 milliGRAM(s) Oral every 4 hours PRN Severe Pain (7 - 10)          Allergies    ASA; dye contrast (Anaphylaxis)  aspirin (Short breath)  divalproex sodium (Other (Unknown))  Haldol (Other (Unknown))  penicillin (Short breath; Rash)  sulfa drugs (Short breath; Rash)  Xanax (Other (Unknown))    Intolerances    Vital Signs Last 24 Hrs  T(C): 36.8 (24 Jun 2018 16:15), Max: 37.7 (23 Jun 2018 22:15)  T(F): 98.3 (24 Jun 2018 16:15), Max: 99.9 (23 Jun 2018 22:15)  HR: 101 (24 Jun 2018 16:15) (83 - 106)  BP: 142/66 (24 Jun 2018 16:15) (120/58 - 142/66)  BP(mean): --  RR: 18 (24 Jun 2018 16:15) (18 - 18)  SpO2: 97% (24 Jun 2018 16:15) (95% - 97%)    PHYSICAL EXAM:  GENERAL: NAD, well nourished and conversant  HEAD:  Atraumatic  EYES: EOM, PERRLA, conjunctiva pink and sclera white  ENT: No tonsillar erythema, exudates, or enlargement, moist mucous membranes, good dentition, no lesions  NECK: Supple, No JVD, normal thyroid, carotids with normal upstrokes and no bruits  CHEST/LUNG: Clear to auscultation bilaterally, No rales, rhonchi, wheezing, or rubs  HEART: Regular rate and rhythm, No murmurs, rubs, or gallops  ABDOMEN: Soft, nondistended, no masses, guarding, tenderness or rebound, bowel sounds present  EXTREMITIES:  2+ Peripheral Pulses. 2+ right hip swelling and 2+ tenderness to touch. No erythema or incisional drainage.  LYMPH: No lymphadenopathy noted  SKIN: No rashes or lesions  NERVOUS SYSTEM:  Alert & Oriented X3, normal cognitive function. Motor Strength 5/5 right upper and right lower.  5/5 left upper and left lower extremities, DTRs 2+ intact and symmetric    LABS:            CBC Full  -  ( 24 Jun 2018 08:20 )  WBC Count : 11.42 K/uL  Hemoglobin : 7.7 g/dL  Hematocrit : 23.7 %  Platelet Count - Automated : 159 K/uL  Mean Cell Volume : 80.1 fl  Mean Cell Hemoglobin : 26.0 pg  Mean Cell Hemoglobin Concentration : 32.5 gm/dL  Auto Neutrophil # : x  Auto Lymphocyte # : x  Auto Monocyte # : x  Auto Eosinophil # : x  Auto Basophil # : x  Auto Neutrophil % : x  Auto Lymphocyte % : x  Auto Monocyte % : x  Auto Eosinophil % : x  Auto Basophil % : x    06-24    137  |  100  |  16  ----------------------------<  135<H>  3.7   |  26  |  0.82    Ca    8.1<L>      24 Jun 2018 07:08

## 2018-06-24 NOTE — PROGRESS NOTE ADULT - SUBJECTIVE AND OBJECTIVE BOX
ORTHO  Patient is a 68y old  Female who presents with a chief complaint of Hip Fracture (22 Jun 2018 00:03)    Pt. resting without complaint    VS-  T(C): 37.1 (06-23-18 @ 23:32), Max: 37.7 (06-23-18 @ 22:15)  HR: 83 (06-23-18 @ 23:32) (83 - 104)  BP: 125/57 (06-23-18 @ 23:32) (104/68 - 140/70)  RR: 18 (06-23-18 @ 23:32) (18 - 18)  SpO2: 96% (06-23-18 @ 23:32) (94% - 97%)  Wt(kg): --    M.S. Alert  Extremity- Right hip aqua cell dressing C/D/I  Neuro-              Motor- (+) Ankle DF/PF, abd pillow in place              Sensation-grossly intact to light touch              Calves- soft, nontender- PAS                               9.4    17.9  )-----------( 161      ( 23 Jun 2018 10:41 )             27.5     06-22    140  |  102  |  22  ----------------------------<  124<H>  4.6   |  25  |  0.78    Ca    7.5<L>      22 Jun 2018 14:56

## 2018-06-24 NOTE — DISCHARGE NOTE ADULT - PATIENT PORTAL LINK FT
You can access the [facility] Patient Portal, offered by [facility], by registering with the following website: [email or web address]

## 2018-06-24 NOTE — DISCHARGE NOTE ADULT - FINDINGS/TREATMENT
See OR dictation report. Continue weight bearing as tolerated ambulation, posterior total hip precautions

## 2018-06-24 NOTE — DISCHARGE NOTE ADULT - ADDITIONAL INSTRUCTIONS
It is highly advised you follow up with your Primary Care Physician within 4 weeks of discharge to discuss recent surgery/general checkup/possible medication adjustment. Follow up with Dr. Rey 3-4 weeks post op. Call office to make appointment.  Activity: Weight Bearing as tolerated. Posterior Hip Precautions.  Dressing: keep clean and dry, have dressing/sutures/staples removed and steri-strips applied post operative day #14 It is highly advised you follow up with your Primary Care Physician within 4 weeks of discharge to discuss recent surgery/general checkup/possible medication adjustment. Follow up with Dr. Rey 12-14 days post op. Call office to make appointment.  Activity: Weight Bearing as tolerated. Posterior Hip Precautions.  Dressing: keep clean and dry,  It is highly advised you follow up with your Primary Care Physician within 4 weeks of discharge to discuss recent surgery/general checkup/possible medication adjustment.

## 2018-06-24 NOTE — DISCHARGE NOTE ADULT - PLAN OF CARE
Painless ambulation. Return to normal activities. Follow up with Dr. Rey in 10-12 days. Call office to make appointment.  Activity: Weight Bearing as tolerated. Posterior Hip Precautions.  Dressing: Follow up with Dr. Rey in 10-12 days. Call office to make appointment.  Activity: Weight Bearing as tolerated. Posterior Hip Precautions.  Dressing: keep clean and dry. Follow up with Dr. Rey 3-4 weeks post op. Call office to make appointment.  Activity: Weight Bearing as tolerated. Posterior Hip Precautions.  Dressing: keep clean and dry, have dressing/sutures/staples removed and steri-strips applied post operative day #14 Follow up with Dr. Rey 12-14 days post op for wound check and suture removal.  Call office to make appointment.  Activity: Weight Bearing as tolerated. Posterior Hip Precautions.  Dressing: keep clean and dry,

## 2018-06-24 NOTE — DISCHARGE NOTE ADULT - MEDICATION SUMMARY - MEDICATIONS TO TAKE
I will START or STAY ON the medications listed below when I get home from the hospital:    predniSONE 10 mg oral tablet  -- 1 tab(s) by mouth once a day  -- Indication: For home steroid dose    acetaminophen 325 mg oral tablet  -- 2 tab(s) by mouth every 6 hours, As needed, For Temp greater than 38 C (100.4 F)  -- Indication: For Fever, H/A    acetaminophen 325 mg oral tablet  -- 2 tab(s) by mouth every 6 hours, As needed, Mild Pain (1 - 3)  -- Indication: For mild pain    oxyCODONE  -- 5 milligram(s) by mouth every 4 hours, As Needed moderate pain  -- Indication: For moderate pain    enoxaparin  -- 40 milligram(s) subcutaneous once a day, for 6 weeks post op  -- Indication: For anticoagulation prophylaxis, stay on this medication for 6 weeks post op    KlonoPIN 0.5 mg oral tablet  -- 1 tab(s) by mouth once a day in the morning  -- Indication: For Central nervous system agent    KlonoPIN 0.5 mg oral tablet  -- 2 tab(s) by mouth once a day at night  -- Indication: For Central nervous system agent    mirtazapine 45 mg oral tablet  -- 1 tab(s) by mouth once a day  -- Indication: For mood stabilizer    sertraline 50 mg oral tablet  -- 1 tab(s) by mouth once a day  -- Indication: For mood stabilizer    clopidogrel 75 mg oral tablet  -- 1 tab(s) by mouth once a day  -- Indication: For antiplatelet agent    QUEtiapine 50 mg oral tablet  -- 1 tab(s) by mouth once a day (at bedtime)  -- Indication: For antipsychotic agent    Spiriva 18 mcg inhalation capsule  -- 1 cap(s) inhaled once a day  -- Indication: For respiratory agent    Advair Diskus 500 mcg-50 mcg inhalation powder  -- 2 puff(s) inhaled 2 times a day  -- Indication: For respiratory agent    famotidine 20 mg oral tablet  -- 1 tab(s) by mouth once a day  -- Indication: For gastrointestinal agent    bisacodyl 10 mg rectal suppository  -- 1 suppository(ies) rectally once a day, As needed, Constipation  -- Indication: For laxative    Zyflo 600 mg oral tablet  -- 1 tab(s) by mouth 4 times a day  -- Indication: For respiratory agent I will START or STAY ON the medications listed below when I get home from the hospital:    predniSONE 10 mg oral tablet  -- 1 tab(s) by mouth once a day  -- Indication: For home steroid dose    acetaminophen 325 mg oral tablet  -- 2 tab(s) by mouth every 6 hours, As needed, For Temp greater than 38 C (100.4 F)  -- Indication: For Fever, H/A    acetaminophen 325 mg oral tablet  -- 2 tab(s) by mouth every 6 hours, As needed, Mild Pain (1 - 3)  -- Indication: For mild pain    oxyCODONE  -- 5 milligram(s) by mouth every 4 hours, As Needed moderate pain  -- Indication: For moderate pain    oxyCODONE 5 mg oral capsule  -- 1 tab(s) by mouth every 6 hours, As Needed -for moderate pain MDD:4  -- Indication: For moderate pain    enoxaparin 40 mg/0.4 mL injectable solution  -- 40 milligram(s) subcutaneously once a day   -- It is very important that you take or use this exactly as directed.  Do not skip doses or discontinue unless directed by your doctor.    -- Indication: For anticoagulation prophylaxis, stay on this medication for 6 weeks post op    enoxaparin  -- 40 milligram(s) subcutaneous once a day, for 6 weeks post op  -- Indication: For anticoagulation prophylaxis, stay on this medication for 6 weeks post op    KlonoPIN 0.5 mg oral tablet  -- 1 tab(s) by mouth once a day in the morning  -- Indication: For Central nervous system agent    KlonoPIN 0.5 mg oral tablet  -- 2 tab(s) by mouth once a day at night  -- Indication: For Central nervous system agent    mirtazapine 45 mg oral tablet  -- 1 tab(s) by mouth once a day  -- Indication: For mood stabilizer    sertraline 50 mg oral tablet  -- 1 tab(s) by mouth once a day  -- Indication: For mood stabilizer    clopidogrel 75 mg oral tablet  -- 1 tab(s) by mouth once a day  -- Indication: For antiplatelet agent    QUEtiapine 50 mg oral tablet  -- 1 tab(s) by mouth once a day (at bedtime)  -- Indication: For antipsychotic agent    Spiriva 18 mcg inhalation capsule  -- 1 cap(s) inhaled once a day  -- Indication: For respiratory agent    Advair Diskus 500 mcg-50 mcg inhalation powder  -- 2 puff(s) inhaled 2 times a day  -- Indication: For respiratory agent    famotidine 20 mg oral tablet  -- 1 tab(s) by mouth once a day  -- Indication: For gastrointestinal agent    bisacodyl 10 mg rectal suppository  -- 1 suppository(ies) rectally once a day, As needed, Constipation  -- Indication: For laxative    Zyflo 600 mg oral tablet  -- 1 tab(s) by mouth 4 times a day  -- Indication: For respiratory agent

## 2018-06-24 NOTE — DISCHARGE NOTE ADULT - CARE PLAN
Principal Discharge DX:	Femur fracture, right  Goal:	Painless ambulation. Return to normal activities.  Assessment and plan of treatment:	Follow up with Dr. Rey in 10-12 days. Call office to make appointment.  Activity: Weight Bearing as tolerated. Posterior Hip Precautions.  Dressing: Principal Discharge DX:	Femur fracture, right  Goal:	Painless ambulation. Return to normal activities.  Assessment and plan of treatment:	Follow up with Dr. Rey in 10-12 days. Call office to make appointment.  Activity: Weight Bearing as tolerated. Posterior Hip Precautions.  Dressing: keep clean and dry. Principal Discharge DX:	Femur fracture, right  Goal:	Painless ambulation. Return to normal activities.  Assessment and plan of treatment:	Follow up with Dr. Rey 3-4 weeks post op. Call office to make appointment.  Activity: Weight Bearing as tolerated. Posterior Hip Precautions.  Dressing: keep clean and dry, have dressing/sutures/staples removed and steri-strips applied post operative day #14 Principal Discharge DX:	Femur fracture, right  Goal:	Painless ambulation. Return to normal activities.  Assessment and plan of treatment:	Follow up with Dr. Rey 12-14 days post op for wound check and suture removal.  Call office to make appointment.  Activity: Weight Bearing as tolerated. Posterior Hip Precautions.  Dressing: keep clean and dry,

## 2018-06-25 LAB
ANION GAP SERPL CALC-SCNC: 12 MMOL/L — SIGNIFICANT CHANGE UP (ref 5–17)
BUN SERPL-MCNC: 17 MG/DL — SIGNIFICANT CHANGE UP (ref 7–23)
CALCIUM SERPL-MCNC: 9.2 MG/DL — SIGNIFICANT CHANGE UP (ref 8.4–10.5)
CHLORIDE SERPL-SCNC: 104 MMOL/L — SIGNIFICANT CHANGE UP (ref 96–108)
CO2 SERPL-SCNC: 26 MMOL/L — SIGNIFICANT CHANGE UP (ref 22–31)
CREAT SERPL-MCNC: 0.73 MG/DL — SIGNIFICANT CHANGE UP (ref 0.5–1.3)
GLUCOSE SERPL-MCNC: 152 MG/DL — HIGH (ref 70–99)
HCT VFR BLD CALC: 25.4 % — LOW (ref 34.5–45)
HGB BLD-MCNC: 8.2 G/DL — LOW (ref 11.5–15.5)
MCHC RBC-ENTMCNC: 26.8 PG — LOW (ref 27–34)
MCHC RBC-ENTMCNC: 32.3 GM/DL — SIGNIFICANT CHANGE UP (ref 32–36)
MCV RBC AUTO: 83 FL — SIGNIFICANT CHANGE UP (ref 80–100)
PLATELET # BLD AUTO: 166 K/UL — SIGNIFICANT CHANGE UP (ref 150–400)
POTASSIUM SERPL-MCNC: 3.9 MMOL/L — SIGNIFICANT CHANGE UP (ref 3.5–5.3)
POTASSIUM SERPL-SCNC: 3.9 MMOL/L — SIGNIFICANT CHANGE UP (ref 3.5–5.3)
RBC # BLD: 3.06 M/UL — LOW (ref 3.8–5.2)
RBC # FLD: 17.5 % — HIGH (ref 10.3–14.5)
SODIUM SERPL-SCNC: 142 MMOL/L — SIGNIFICANT CHANGE UP (ref 135–145)
WBC # BLD: 8.3 K/UL — SIGNIFICANT CHANGE UP (ref 3.8–10.5)
WBC # FLD AUTO: 8.3 K/UL — SIGNIFICANT CHANGE UP (ref 3.8–10.5)

## 2018-06-25 RX ORDER — OXYCODONE HYDROCHLORIDE 5 MG/1
1 TABLET ORAL
Qty: 0 | Refills: 0 | COMMUNITY
Start: 2018-06-25

## 2018-06-25 RX ORDER — ACETAMINOPHEN 500 MG
700 TABLET ORAL ONCE
Qty: 0 | Refills: 0 | Status: COMPLETED | OUTPATIENT
Start: 2018-06-25 | End: 2018-06-25

## 2018-06-25 RX ORDER — QUETIAPINE FUMARATE 200 MG/1
1 TABLET, FILM COATED ORAL
Qty: 0 | Refills: 0 | COMMUNITY
Start: 2018-06-25

## 2018-06-25 RX ORDER — MIRTAZAPINE 45 MG/1
3 TABLET, ORALLY DISINTEGRATING ORAL
Qty: 0 | Refills: 0 | COMMUNITY
Start: 2018-06-25

## 2018-06-25 RX ORDER — MIRTAZAPINE 45 MG/1
1 TABLET, ORALLY DISINTEGRATING ORAL
Qty: 0 | Refills: 0 | COMMUNITY
Start: 2018-06-25

## 2018-06-25 RX ORDER — SERTRALINE 25 MG/1
1 TABLET, FILM COATED ORAL
Qty: 0 | Refills: 0 | COMMUNITY
Start: 2018-06-25

## 2018-06-25 RX ORDER — ACETAMINOPHEN 500 MG
2 TABLET ORAL
Qty: 0 | Refills: 0 | COMMUNITY
Start: 2018-06-25

## 2018-06-25 RX ORDER — OXYCODONE HYDROCHLORIDE 5 MG/1
5 TABLET ORAL
Qty: 0 | Refills: 0 | COMMUNITY
Start: 2018-06-25

## 2018-06-25 RX ORDER — ENOXAPARIN SODIUM 100 MG/ML
40 INJECTION SUBCUTANEOUS
Qty: 0 | Refills: 0 | COMMUNITY
Start: 2018-06-25

## 2018-06-25 RX ORDER — ACETAMINOPHEN 500 MG
1000 TABLET ORAL ONCE
Qty: 0 | Refills: 0 | Status: DISCONTINUED | OUTPATIENT
Start: 2018-06-25 | End: 2018-06-25

## 2018-06-25 RX ADMIN — Medication 650 MILLIGRAM(S): at 17:28

## 2018-06-25 RX ADMIN — Medication 100 MILLIGRAM(S): at 13:15

## 2018-06-25 RX ADMIN — MIRTAZAPINE 45 MILLIGRAM(S): 45 TABLET, ORALLY DISINTEGRATING ORAL at 22:38

## 2018-06-25 RX ADMIN — POLYETHYLENE GLYCOL 3350 17 GRAM(S): 17 POWDER, FOR SOLUTION ORAL at 13:16

## 2018-06-25 RX ADMIN — Medication 10 MILLIGRAM(S): at 06:01

## 2018-06-25 RX ADMIN — Medication 3 MILLILITER(S): at 13:16

## 2018-06-25 RX ADMIN — OXYCODONE HYDROCHLORIDE 5 MILLIGRAM(S): 5 TABLET ORAL at 16:26

## 2018-06-25 RX ADMIN — Medication 3 MILLILITER(S): at 17:28

## 2018-06-25 RX ADMIN — Medication 100 MILLIGRAM(S): at 06:01

## 2018-06-25 RX ADMIN — TIOTROPIUM BROMIDE 1 CAPSULE(S): 18 CAPSULE ORAL; RESPIRATORY (INHALATION) at 13:16

## 2018-06-25 RX ADMIN — SERTRALINE 50 MILLIGRAM(S): 25 TABLET, FILM COATED ORAL at 06:01

## 2018-06-25 RX ADMIN — BUDESONIDE AND FORMOTEROL FUMARATE DIHYDRATE 2 PUFF(S): 160; 4.5 AEROSOL RESPIRATORY (INHALATION) at 17:28

## 2018-06-25 RX ADMIN — OXYCODONE HYDROCHLORIDE 5 MILLIGRAM(S): 5 TABLET ORAL at 11:30

## 2018-06-25 RX ADMIN — Medication 2 MILLIGRAM(S): at 22:38

## 2018-06-25 RX ADMIN — FAMOTIDINE 20 MILLIGRAM(S): 10 INJECTION INTRAVENOUS at 17:28

## 2018-06-25 RX ADMIN — FAMOTIDINE 20 MILLIGRAM(S): 10 INJECTION INTRAVENOUS at 06:01

## 2018-06-25 RX ADMIN — OXYCODONE HYDROCHLORIDE 5 MILLIGRAM(S): 5 TABLET ORAL at 06:09

## 2018-06-25 RX ADMIN — OXYCODONE HYDROCHLORIDE 5 MILLIGRAM(S): 5 TABLET ORAL at 15:56

## 2018-06-25 RX ADMIN — Medication 700 MILLIGRAM(S): at 20:14

## 2018-06-25 RX ADMIN — Medication 100 MILLIGRAM(S): at 22:38

## 2018-06-25 RX ADMIN — Medication 280 MILLIGRAM(S): at 19:44

## 2018-06-25 RX ADMIN — Medication 3 MILLIGRAM(S): at 22:38

## 2018-06-25 RX ADMIN — OXYCODONE HYDROCHLORIDE 5 MILLIGRAM(S): 5 TABLET ORAL at 12:00

## 2018-06-25 RX ADMIN — SENNA PLUS 2 TABLET(S): 8.6 TABLET ORAL at 22:38

## 2018-06-25 RX ADMIN — OXYCODONE HYDROCHLORIDE 5 MILLIGRAM(S): 5 TABLET ORAL at 06:40

## 2018-06-25 RX ADMIN — Medication 1 TABLET(S): at 13:15

## 2018-06-25 RX ADMIN — BUDESONIDE AND FORMOTEROL FUMARATE DIHYDRATE 2 PUFF(S): 160; 4.5 AEROSOL RESPIRATORY (INHALATION) at 06:02

## 2018-06-25 RX ADMIN — ENOXAPARIN SODIUM 40 MILLIGRAM(S): 100 INJECTION SUBCUTANEOUS at 13:14

## 2018-06-25 RX ADMIN — QUETIAPINE FUMARATE 50 MILLIGRAM(S): 200 TABLET, FILM COATED ORAL at 22:38

## 2018-06-25 NOTE — PROGRESS NOTE ADULT - SUBJECTIVE AND OBJECTIVE BOX
PULMONARY PROGRESS NOTE    MYRIAM HEATH  MRN-[id number]    Patient is a 68y old  Female who presents with a chief complaint of Hip Fracture (22 Jun 2018 00:03)      HPI:  s/p R hemiarthroplasty.  denies sob/cough/wheeze.     MEDICATIONS  (STANDING):  ALBUTerol/ipratropium for Nebulization 3 milliLiter(s) Nebulizer every 6 hours  buDESOnide 160 MICROgram(s)/formoterol 4.5 MICROgram(s) Inhaler 2 Puff(s) Inhalation two times a day  calcium carbonate 1250 mG + Vitamin D (OsCal 500 + D) 1 Tablet(s) Oral daily  clonazePAM Tablet 2 milliGRAM(s) Oral at bedtime  docusate sodium 100 milliGRAM(s) Oral three times a day  enoxaparin Injectable 40 milliGRAM(s) SubCutaneous daily  famotidine    Tablet 20 milliGRAM(s) Oral two times a day  lactated ringers. 1000 milliLiter(s) (75 mL/Hr) IV Continuous <Continuous>  melatonin 3 milliGRAM(s) Oral at bedtime  mirtazapine 45 milliGRAM(s) Oral daily  polyethylene glycol 3350 17 Gram(s) Oral daily  predniSONE   Tablet 10 milliGRAM(s) Oral daily  QUEtiapine 50 milliGRAM(s) Oral at bedtime  senna 2 Tablet(s) Oral at bedtime  sertraline 50 milliGRAM(s) Oral daily  tiotropium 18 MICROgram(s) Capsule 1 Capsule(s) Inhalation daily    MEDICATIONS  (PRN):  acetaminophen   Tablet 650 milliGRAM(s) Oral every 6 hours PRN For Temp greater than 38 C (100.4 F)  acetaminophen   Tablet. 650 milliGRAM(s) Oral every 6 hours PRN Mild Pain (1 - 3)  aluminum hydroxide/magnesium hydroxide/simethicone Suspension 30 milliLiter(s) Oral four times a day PRN Indigestion  bisacodyl Suppository 10 milliGRAM(s) Rectal daily PRN If no bowel movement by POD#2  HYDROmorphone  Injectable 0.5 milliGRAM(s) IV Push every 10 minutes PRN Moderate Pain (4 - 6)  magnesium hydroxide Suspension 30 milliLiter(s) Oral daily PRN Constipation  morphine  - Injectable 2 milliGRAM(s) IV Push every 4 hours PRN breakthrough pain  ondansetron Injectable 4 milliGRAM(s) IV Push every 6 hours PRN Nausea and/or Vomiting  oxyCODONE    IR 2.5 milliGRAM(s) Oral every 4 hours PRN Moderate Pain (4 - 6)  oxyCODONE    IR 5 milliGRAM(s) Oral every 4 hours PRN Severe Pain (7 - 10)          EXAM:  Vital Signs Last 24 Hrs  T(C): 36.7 (25 Jun 2018 05:58), Max: 37.4 (24 Jun 2018 21:03)  T(F): 98.1 (25 Jun 2018 05:58), Max: 99.4 (24 Jun 2018 21:03)  HR: 86 (25 Jun 2018 05:58) (86 - 112)  BP: 129/72 (25 Jun 2018 05:58) (120/58 - 147/84)  BP(mean): --  RR: 18 (25 Jun 2018 05:58) (18 - 18)  SpO2: 93% (25 Jun 2018 05:58) (93% - 97%)  GENERAL: The patient is awake and alert in no apparent distress.     SKIN: Warm, dry, no rashes    LUNGS: Clear to auscultation without wheezing, rales or rhonchi; respirations unlabored    HEART:S1/S2    ABDOMEN: +BS, Soft, Nontender      LABS/IMGAING: reviewed                                              7.7    11.42 )-----------( 159      ( 24 Jun 2018 08:20 )             23.7   06-25    142  |  104  |  17  ----------------------------<  152<H>  3.9   |  26  |  0.73    Ca    9.2      25 Jun 2018 07:40            < from: Xray Chest 1 View AP/PA (06.21.18 @ 22:33) >  IMPRESSION:   Clear lungs.    < end of copied text >    PROBLEM LIST:  68y Female with HEALTH ISSUES - PROBLEM Dx:  Hip Fracture  Asthma  Dementia    RECS:  -Asthma: not in exacerbation at this time, continue symbicort,  PRN duonebs Q6 hours.  -post op pain control, monitor mental status given hx of dementia  -Incentive neelima, PT.    Luciana Mancini MD  [phone]

## 2018-06-25 NOTE — PROGRESS NOTE ADULT - SUBJECTIVE AND OBJECTIVE BOX
ORTHO  Pt doing well this AM, pain well controlled.  Working well with PT.    Vital Signs Last 24 Hrs  T(C): 36.7 (25 Jun 2018 05:58), Max: 37.4 (24 Jun 2018 21:03)  T(F): 98.1 (25 Jun 2018 05:58), Max: 99.4 (24 Jun 2018 21:03)  HR: 86 (25 Jun 2018 05:58) (86 - 112)  BP: 129/72 (25 Jun 2018 05:58) (120/58 - 147/84)  BP(mean): --  RR: 18 (25 Jun 2018 05:58) (18 - 18)  SpO2: 93% (25 Jun 2018 05:58) (93% - 97%)      Extremity- Right hip aqua cell dressing C/D/I  Neuro-              Motor- (+) Ankle DF/PF, abd pillow in place              Sensation-grossly intact to light touch              Calves- soft, nontender- PAS                                          7.7    11.42 )-----------( 159      ( 24 Jun 2018 08:20 )             23.7     06-24    137  |  100  |  16  ----------------------------<  135<H>  3.7   |  26  |  0.82    Ca    8.1<L>      24 Jun 2018 07:08

## 2018-06-25 NOTE — PROGRESS NOTE ADULT - SUBJECTIVE AND OBJECTIVE BOX
Patient is a 68y old  Female who presents with a chief complaint of right Hip pain and x rays confirmed a  right femoral neck fracture. She underwent right hemiarthroplasty on 06/22/18 and is now post-op. She has moderate right hip pain and narcotics were held with post-op hypotension. BPbetter controlled now. minimal sedation of the Patient. Cognitive  function is compromised by her baseline dementia. She received occupational therapy and physical therapy and is beginning to ambulate. She has no other new medical complaints at this time.    MEDICATIONS  (STANDING):  ALBUTerol/ipratropium for Nebulization 3 milliLiter(s) Nebulizer every 6 hours  buDESOnide 160 MICROgram(s)/formoterol 4.5 MICROgram(s) Inhaler 2 Puff(s) Inhalation two times a day  calcium carbonate 1250 mG + Vitamin D (OsCal 500 + D) 1 Tablet(s) Oral daily  clonazePAM Tablet 2 milliGRAM(s) Oral at bedtime  docusate sodium 100 milliGRAM(s) Oral three times a day  enoxaparin Injectable 40 milliGRAM(s) SubCutaneous daily  famotidine    Tablet 20 milliGRAM(s) Oral two times a day  lactated ringers. 1000 milliLiter(s) (75 mL/Hr) IV Continuous <Continuous>  melatonin 3 milliGRAM(s) Oral at bedtime  mirtazapine 45 milliGRAM(s) Oral daily  polyethylene glycol 3350 17 Gram(s) Oral daily  predniSONE   Tablet 10 milliGRAM(s) Oral daily  QUEtiapine 50 milliGRAM(s) Oral at bedtime  senna 2 Tablet(s) Oral at bedtime  sertraline 50 milliGRAM(s) Oral daily  tiotropium 18 MICROgram(s) Capsule 1 Capsule(s) Inhalation daily    MEDICATIONS  (PRN):  acetaminophen   Tablet 650 milliGRAM(s) Oral every 6 hours PRN For Temp greater than 38 C (100.4 F)  acetaminophen   Tablet. 650 milliGRAM(s) Oral every 6 hours PRN Mild Pain (1 - 3)  aluminum hydroxide/magnesium hydroxide/simethicone Suspension 30 milliLiter(s) Oral four times a day PRN Indigestion  bisacodyl Suppository 10 milliGRAM(s) Rectal daily PRN If no bowel movement by POD#2  HYDROmorphone  Injectable 0.5 milliGRAM(s) IV Push every 10 minutes PRN Moderate Pain (4 - 6)  magnesium hydroxide Suspension 30 milliLiter(s) Oral daily PRN Constipation  morphine  - Injectable 2 milliGRAM(s) IV Push every 4 hours PRN breakthrough pain  ondansetron Injectable 4 milliGRAM(s) IV Push every 6 hours PRN Nausea and/or Vomiting  oxyCODONE    IR 2.5 milliGRAM(s) Oral every 4 hours PRN Moderate Pain (4 - 6)  oxyCODONE    IR 5 milliGRAM(s) Oral every 4 hours PRN Severe Pain (7 - 10)          VITALS:   T(C): 37.4 (06-25-18 @ 21:20), Max: 38.3 (06-25-18 @ 17:17)  HR: 101 (06-25-18 @ 21:20) (86 - 112)  BP: 112/68 (06-25-18 @ 21:20) (112/68 - 165/78)  RR: 18 (06-25-18 @ 21:20) (18 - 18)  SpO2: 95% (06-25-18 @ 21:20) (93% - 95%)  Wt(kg): --      PHYSICAL EXAM:  GENERAL: NAD, well nourished and conversant  HEAD:  Atraumatic  EYES: EOM, PERRLA, conjunctiva pink and sclera white  ENT: No tonsillar erythema, exudates, or enlargement, moist mucous membranes, good dentition, no lesions  NECK: Supple, No JVD, normal thyroid, carotids with normal upstrokes and no bruits  CHEST/LUNG: Clear to auscultation bilaterally, No rales, rhonchi, wheezing, or rubs  HEART: Regular rate and rhythm, No murmurs, rubs, or gallops  ABDOMEN: Soft, nondistended, no masses, guarding, tenderness or rebound, bowel sounds present  EXTREMITIES:  2+ Peripheral Pulses. 2+ right hip swelling and 2+ tenderness to touch. No erythema or incisional drainage.  LYMPH: No lymphadenopathy noted  SKIN: No rashes or lesions  NERVOUS SYSTEM:  Alert & Oriented X3, normal cognitive function. Motor Strength 5/5 right upper and right lower.  5/5 left upper and left lower extremities, LABS:        CBC Full  -  ( 25 Jun 2018 09:28 )  WBC Count : 8.30 K/uL  Hemoglobin : 8.2 g/dL  Hematocrit : 25.4 %  Platelet Count - Automated : 166 K/uL  Mean Cell Volume : 83.0 fl  Mean Cell Hemoglobin : 26.8 pg  Mean Cell Hemoglobin Concentration : 32.3 gm/dL  Auto Neutrophil # : x  Auto Lymphocyte # : x  Auto Monocyte # : x  Auto Eosinophil # : x  Auto Basophil # : x  Auto Neutrophil % : x  Auto Lymphocyte % : x  Auto Monocyte % : x  Auto Eosinophil % : x  Auto Basophil % : x    06-25    142  |  104  |  17  ----------------------------<  152<H>  3.9   |  26  |  0.73    Ca    9.2      25 Jun 2018 07:40            CAPILLARY BLOOD GLUCOSE          RADIOLOGY & ADDITIONAL TESTS:

## 2018-06-26 VITALS
TEMPERATURE: 98 F | DIASTOLIC BLOOD PRESSURE: 76 MMHG | SYSTOLIC BLOOD PRESSURE: 134 MMHG | OXYGEN SATURATION: 95 % | RESPIRATION RATE: 18 BRPM | HEART RATE: 89 BPM

## 2018-06-26 DIAGNOSIS — R52 PAIN, UNSPECIFIED: ICD-10-CM

## 2018-06-26 DIAGNOSIS — F03.90 UNSPECIFIED DEMENTIA WITHOUT BEHAVIORAL DISTURBANCE: ICD-10-CM

## 2018-06-26 DIAGNOSIS — S72.91XA UNSPECIFIED FRACTURE OF RIGHT FEMUR, INITIAL ENCOUNTER FOR CLOSED FRACTURE: ICD-10-CM

## 2018-06-26 LAB
HCT VFR BLD CALC: 25.9 % — LOW (ref 34.5–45)
HGB BLD-MCNC: 8.4 G/DL — LOW (ref 11.5–15.5)

## 2018-06-26 PROCEDURE — 97116 GAIT TRAINING THERAPY: CPT

## 2018-06-26 PROCEDURE — 85014 HEMATOCRIT: CPT

## 2018-06-26 PROCEDURE — 86901 BLOOD TYPING SEROLOGIC RH(D): CPT

## 2018-06-26 PROCEDURE — 97161 PT EVAL LOW COMPLEX 20 MIN: CPT

## 2018-06-26 PROCEDURE — 86900 BLOOD TYPING SEROLOGIC ABO: CPT

## 2018-06-26 PROCEDURE — 97166 OT EVAL MOD COMPLEX 45 MIN: CPT

## 2018-06-26 PROCEDURE — 72170 X-RAY EXAM OF PELVIS: CPT

## 2018-06-26 PROCEDURE — 99285 EMERGENCY DEPT VISIT HI MDM: CPT | Mod: 25

## 2018-06-26 PROCEDURE — 73562 X-RAY EXAM OF KNEE 3: CPT

## 2018-06-26 PROCEDURE — 97110 THERAPEUTIC EXERCISES: CPT

## 2018-06-26 PROCEDURE — 86923 COMPATIBILITY TEST ELECTRIC: CPT

## 2018-06-26 PROCEDURE — 80048 BASIC METABOLIC PNL TOTAL CA: CPT

## 2018-06-26 PROCEDURE — C1776: CPT

## 2018-06-26 PROCEDURE — 73552 X-RAY EXAM OF FEMUR 2/>: CPT

## 2018-06-26 PROCEDURE — 71045 X-RAY EXAM CHEST 1 VIEW: CPT

## 2018-06-26 PROCEDURE — 85610 PROTHROMBIN TIME: CPT

## 2018-06-26 PROCEDURE — C1889: CPT

## 2018-06-26 PROCEDURE — 85018 HEMOGLOBIN: CPT

## 2018-06-26 PROCEDURE — P9040: CPT

## 2018-06-26 PROCEDURE — 93005 ELECTROCARDIOGRAM TRACING: CPT

## 2018-06-26 PROCEDURE — 85730 THROMBOPLASTIN TIME PARTIAL: CPT

## 2018-06-26 PROCEDURE — 81001 URINALYSIS AUTO W/SCOPE: CPT

## 2018-06-26 PROCEDURE — P9016: CPT

## 2018-06-26 PROCEDURE — 94640 AIRWAY INHALATION TREATMENT: CPT

## 2018-06-26 PROCEDURE — 96374 THER/PROPH/DIAG INJ IV PUSH: CPT

## 2018-06-26 PROCEDURE — 80053 COMPREHEN METABOLIC PANEL: CPT

## 2018-06-26 PROCEDURE — 97530 THERAPEUTIC ACTIVITIES: CPT

## 2018-06-26 PROCEDURE — C1713: CPT

## 2018-06-26 PROCEDURE — 85027 COMPLETE CBC AUTOMATED: CPT

## 2018-06-26 PROCEDURE — 36430 TRANSFUSION BLD/BLD COMPNT: CPT

## 2018-06-26 PROCEDURE — 97535 SELF CARE MNGMENT TRAINING: CPT

## 2018-06-26 PROCEDURE — 86850 RBC ANTIBODY SCREEN: CPT

## 2018-06-26 PROCEDURE — 73502 X-RAY EXAM HIP UNI 2-3 VIEWS: CPT

## 2018-06-26 RX ORDER — OXYCODONE HYDROCHLORIDE 5 MG/1
1 TABLET ORAL
Qty: 28 | Refills: 0 | OUTPATIENT
Start: 2018-06-26 | End: 2018-07-02

## 2018-06-26 RX ORDER — ENOXAPARIN SODIUM 100 MG/ML
40 INJECTION SUBCUTANEOUS
Qty: 38 | Refills: 0 | OUTPATIENT
Start: 2018-06-26 | End: 2018-08-02

## 2018-06-26 RX ADMIN — Medication 650 MILLIGRAM(S): at 12:20

## 2018-06-26 RX ADMIN — Medication 3 MILLILITER(S): at 06:17

## 2018-06-26 RX ADMIN — Medication 650 MILLIGRAM(S): at 13:50

## 2018-06-26 RX ADMIN — Medication 100 MILLIGRAM(S): at 05:05

## 2018-06-26 RX ADMIN — Medication 1 TABLET(S): at 11:47

## 2018-06-26 RX ADMIN — MIRTAZAPINE 45 MILLIGRAM(S): 45 TABLET, ORALLY DISINTEGRATING ORAL at 11:47

## 2018-06-26 RX ADMIN — ENOXAPARIN SODIUM 40 MILLIGRAM(S): 100 INJECTION SUBCUTANEOUS at 11:47

## 2018-06-26 RX ADMIN — FAMOTIDINE 20 MILLIGRAM(S): 10 INJECTION INTRAVENOUS at 05:05

## 2018-06-26 RX ADMIN — Medication 10 MILLIGRAM(S): at 05:05

## 2018-06-26 RX ADMIN — BUDESONIDE AND FORMOTEROL FUMARATE DIHYDRATE 2 PUFF(S): 160; 4.5 AEROSOL RESPIRATORY (INHALATION) at 05:05

## 2018-06-26 RX ADMIN — OXYCODONE HYDROCHLORIDE 5 MILLIGRAM(S): 5 TABLET ORAL at 12:20

## 2018-06-26 RX ADMIN — OXYCODONE HYDROCHLORIDE 5 MILLIGRAM(S): 5 TABLET ORAL at 12:50

## 2018-06-26 RX ADMIN — SERTRALINE 50 MILLIGRAM(S): 25 TABLET, FILM COATED ORAL at 05:05

## 2018-06-26 RX ADMIN — Medication 3 MILLILITER(S): at 11:50

## 2018-06-26 RX ADMIN — POLYETHYLENE GLYCOL 3350 17 GRAM(S): 17 POWDER, FOR SOLUTION ORAL at 11:48

## 2018-06-26 NOTE — PROGRESS NOTE ADULT - SUBJECTIVE AND OBJECTIVE BOX
Patient is a 68y old  Female who presents with a chief complaint of right Hip pain and x rays confirmed a  right femoral neck fracture. She underwent right hemiarthroplasty on 06/22/18 and is now post-op. She has moderate right hip pain and narcotics were held with post-op hypotension. BP better controlled now. Minimal sedation of the patient off narcotics. Cognitive  function is compromised by her baseline dementia. She received occupational therapy and physical therapy and is beginning to ambulate. She has no new medical complaints at this time.    MEDICATIONS  (STANDING):  ALBUTerol/ipratropium for Nebulization 3 milliLiter(s) Nebulizer every 6 hours  buDESOnide 160 MICROgram(s)/formoterol 4.5 MICROgram(s) Inhaler 2 Puff(s) Inhalation two times a day  calcium carbonate 1250 mG + Vitamin D (OsCal 500 + D) 1 Tablet(s) Oral daily  clonazePAM Tablet 2 milliGRAM(s) Oral at bedtime  docusate sodium 100 milliGRAM(s) Oral three times a day  enoxaparin Injectable 40 milliGRAM(s) SubCutaneous daily  famotidine    Tablet 20 milliGRAM(s) Oral two times a day  lactated ringers. 1000 milliLiter(s) (75 mL/Hr) IV Continuous <Continuous>  melatonin 3 milliGRAM(s) Oral at bedtime  mirtazapine 45 milliGRAM(s) Oral daily  polyethylene glycol 3350 17 Gram(s) Oral daily  predniSONE   Tablet 10 milliGRAM(s) Oral daily  QUEtiapine 50 milliGRAM(s) Oral at bedtime  senna 2 Tablet(s) Oral at bedtime  sertraline 50 milliGRAM(s) Oral daily  tiotropium 18 MICROgram(s) Capsule 1 Capsule(s) Inhalation daily    MEDICATIONS  (PRN):  acetaminophen   Tablet 650 milliGRAM(s) Oral every 6 hours PRN For Temp greater than 38 C (100.4 F)  acetaminophen   Tablet. 650 milliGRAM(s) Oral every 6 hours PRN Mild Pain (1 - 3)  aluminum hydroxide/magnesium hydroxide/simethicone Suspension 30 milliLiter(s) Oral four times a day PRN Indigestion  bisacodyl Suppository 10 milliGRAM(s) Rectal daily PRN If no bowel movement by POD#2  HYDROmorphone  Injectable 0.5 milliGRAM(s) IV Push every 10 minutes PRN Moderate Pain (4 - 6)  magnesium hydroxide Suspension 30 milliLiter(s) Oral daily PRN Constipation  morphine  - Injectable 2 milliGRAM(s) IV Push every 4 hours PRN breakthrough pain  ondansetron Injectable 4 milliGRAM(s) IV Push every 6 hours PRN Nausea and/or Vomiting  oxyCODONE    IR 2.5 milliGRAM(s) Oral every 4 hours PRN Moderate Pain (4 - 6)  oxyCODONE    IR 5 milliGRAM(s) Oral every 4 hours PRN Severe Pain (7 - 10)    Vital Signs Last 24 Hrs  T(C): 36.8 (26 Jun 2018 09:38), Max: 36.8 (26 Jun 2018 09:38)  T(F): 98.3 (26 Jun 2018 09:38), Max: 98.3 (26 Jun 2018 09:38)  HR: 89 (26 Jun 2018 09:38) (89 - 89)  BP: 134/76 (26 Jun 2018 09:38) (134/76 - 134/76)  BP(mean): --  RR: 18 (26 Jun 2018 09:38) (18 - 18)  SpO2: 95% (26 Jun 2018 09:38) (95% - 95%)            PHYSICAL EXAM:  GENERAL: NAD, well nourished and conversant  HEAD:  Atraumatic  EYES: EOM, PERRLA, conjunctiva pink and sclera white  ENT: No tonsillar erythema, exudates, or enlargement, moist mucous membranes, good dentition, no lesions  NECK: Supple, No JVD, normal thyroid, carotids with normal upstrokes and no bruits  CHEST/LUNG: Clear to auscultation bilaterally, No rales, rhonchi, wheezing, or rubs  HEART: Regular rate and rhythm, No murmurs, rubs, or gallops  ABDOMEN: Soft, nondistended, no masses, guarding, tenderness or rebound, bowel sounds present  EXTREMITIES:  2+ Peripheral Pulses. 1+ right hip swelling and 1+ tenderness to touch. No erythema or incisional drainage.  LYMPH: No lymphadenopathy noted  SKIN: No rashes or lesions  NERVOUS SYSTEM:  Alert & Oriented X3, normal cognitive function. Motor Strength 5/5 right upper and right lower.  5/5 left upper and left lower extremities, LABS:      LABS      CBC Full  -  ( 26 Jun 2018 09:22 )  WBC Count : x  Hemoglobin : 8.4 g/dL  Hematocrit : 25.9 %  Platelet Count - Automated : x  Mean Cell Volume : x  Mean Cell Hemoglobin : x  Mean Cell Hemoglobin Concentration : x  Auto Neutrophil # : x  Auto Lymphocyte # : x  Auto Monocyte # : x  Auto Eosinophil # : x  Auto Basophil # : x  Auto Neutrophil % : x  Auto Lymphocyte % : x  Auto Monocyte % : x  Auto Eosinophil % : x  Auto Basophil % : x    06-25    142  |  104  |  17  ----------------------------<  152<H>  3.9   |  26  |  0.73    Ca    9.2      25 Jun 2018 07:40

## 2018-06-26 NOTE — PROGRESS NOTE ADULT - ASSESSMENT
Patient is a 68y old  Female who presents with a chief complaint of right Hip pain and x rays confirmed a  right femoral neck fracture. She underwent right hemiarthroplasty on 06/22/18 and is now post-op. She has moderate right hip pain and narcotics were held with post-op hypotension. she is receiving IV Dilaudid for pain control, as needed. At this time she is arousable.  Cognitive  function was already compromised by her baseline dementia. She received occupational therapy and physical therapy. She has no other new medical complaints at this time.
Impression: Stable       Plan:   Continue present treatment                 Out of bed, ambulate, weight bearing as tolerated                  Physical therapy follow up                  Continue to monitor
Impression: Stable       Plan:   Continue present treatment                 Out of bed, ambulate, weight bearing as tolerated                  Physical therapy follow up                  Continue to monitor    Toney Mckeon PA-C  Orthopaedic Surgery  Team pager [phone]  [phone]
Impression: Stable       Plan:   Continue present treatment, transfusion for post op acute anemia                 Out of bed, ambulate, weight bearing as tolerated                  Physical therapy follow up                  Continue to monitor      Toney Mckeon PA-C  Orthopaedic Surgery  Team pager [phone]  [phone]
Patient is a 68y old  Female who presents with a chief complaint of right Hip pain and x rays confirmed a  right femoral neck fracture. She underwent right hemiarthroplasty on 06/22/18 and is now post-op. She has moderate right hip pain and narcotics were held with post-op hypotension. BP is now averaging 130 systolic and she is receiving IV Dilaudid for pain control, as needed. At this time she is arousable.  Cognitive  function was already compromised by her baseline dementia. She received occupational therapy and physical therapy. She has no other new medical complaints at this time.
Patient is a 68y old  Female who presents with a chief complaint of right Hip pain and x rays confirmed a  right femoral neck fracture. She underwent right hemiarthroplasty on 06/22/18 and is now post-op. She has moderate right hip pain and narcotics were held with post-op hypotension. BP is now averaging 130 systolic and she is receiving IV Dilaudid for pain control, as needed. At this time she is lethargic, but arousable.  Cognitive  function was already compromised by her baseline dementia. She received occupational therapy and is awaiting onset of physical therapy. She has no other new medical complaints at this time.
Patient is a 68y old  Female who presents with a chief complaint of right Hip pain and x rays confirmed a  right femoral neck fracture. She underwent right hemiarthroplasty on 06/22/18 and is now post-op. She has moderate right hip pain and narcotics were held with post-op hypotension. she is receiving oral Tylenol for pain control, as needed. At this time she is easily arousable.  Cognitive  function was already compromised by her baseline dementia. She received occupational therapy and physical therapy. She has no other new medical complaints at this time.
Patient is a 68y old  Female who presents with a chief complaint of right Hip pain and x rays confirm a  right femoral neck fracture. She underwent right hemiarthroplasty on 06/22/18 and is now post-op . She has moderate right hip pain and narcotics are held with post-op hypotension. She is receiving IV Nacl at this time and BP is averaging at 90 systolic. She remains awake and alert, but cognitive function is compromised by her baseline dementia. She has no other new medical complaints at this time.

## 2018-06-26 NOTE — PROGRESS NOTE ADULT - SUBJECTIVE AND OBJECTIVE BOX
PULMONARY PROGRESS NOTE    MYRIAM HEATH  MRN-[id number]    Patient is a 68y old  Female who presents with a chief complaint of Hip Fracture (22 Jun 2018 00:03)      HPI:  s/p R hemiarthroplasty.  resting comfortably, daughter at bedside, no complaints    MEDICATIONS  (STANDING):  ALBUTerol/ipratropium for Nebulization 3 milliLiter(s) Nebulizer every 6 hours  buDESOnide 160 MICROgram(s)/formoterol 4.5 MICROgram(s) Inhaler 2 Puff(s) Inhalation two times a day  calcium carbonate 1250 mG + Vitamin D (OsCal 500 + D) 1 Tablet(s) Oral daily  clonazePAM Tablet 2 milliGRAM(s) Oral at bedtime  docusate sodium 100 milliGRAM(s) Oral three times a day  enoxaparin Injectable 40 milliGRAM(s) SubCutaneous daily  famotidine    Tablet 20 milliGRAM(s) Oral two times a day  lactated ringers. 1000 milliLiter(s) (75 mL/Hr) IV Continuous <Continuous>  melatonin 3 milliGRAM(s) Oral at bedtime  mirtazapine 45 milliGRAM(s) Oral daily  polyethylene glycol 3350 17 Gram(s) Oral daily  predniSONE   Tablet 10 milliGRAM(s) Oral daily  QUEtiapine 50 milliGRAM(s) Oral at bedtime  senna 2 Tablet(s) Oral at bedtime  sertraline 50 milliGRAM(s) Oral daily  tiotropium 18 MICROgram(s) Capsule 1 Capsule(s) Inhalation daily    MEDICATIONS  (PRN):  acetaminophen   Tablet 650 milliGRAM(s) Oral every 6 hours PRN For Temp greater than 38 C (100.4 F)  acetaminophen   Tablet. 650 milliGRAM(s) Oral every 6 hours PRN Mild Pain (1 - 3)  aluminum hydroxide/magnesium hydroxide/simethicone Suspension 30 milliLiter(s) Oral four times a day PRN Indigestion  bisacodyl Suppository 10 milliGRAM(s) Rectal daily PRN If no bowel movement by POD#2  HYDROmorphone  Injectable 0.5 milliGRAM(s) IV Push every 10 minutes PRN Moderate Pain (4 - 6)  magnesium hydroxide Suspension 30 milliLiter(s) Oral daily PRN Constipation  morphine  - Injectable 2 milliGRAM(s) IV Push every 4 hours PRN breakthrough pain  ondansetron Injectable 4 milliGRAM(s) IV Push every 6 hours PRN Nausea and/or Vomiting  oxyCODONE    IR 2.5 milliGRAM(s) Oral every 4 hours PRN Moderate Pain (4 - 6)  oxyCODONE    IR 5 milliGRAM(s) Oral every 4 hours PRN Severe Pain (7 - 10)        EXAM:  Vital Signs Last 24 Hrs  T(C): 36.7 (26 Jun 2018 04:53), Max: 38.3 (25 Jun 2018 17:17)  T(F): 98.1 (26 Jun 2018 04:53), Max: 100.9 (25 Jun 2018 17:17)  HR: 86 (26 Jun 2018 04:53) (86 - 101)  BP: 148/84 (26 Jun 2018 04:53) (112/68 - 165/78)  BP(mean): --  RR: 18 (26 Jun 2018 04:53) (18 - 18)  SpO2: 96% (26 Jun 2018 04:53) (93% - 96%)  GENERAL: sleeping comfortably      LABS/IMGAING: reviewed                               8.2    8.30  )-----------( 166      ( 25 Jun 2018 09:28 )             25.4   06-25    142  |  104  |  17  ----------------------------<  152<H>  3.9   |  26  |  0.73    Ca    9.2      25 Jun 2018 07:40            < from: Xray Chest 1 View AP/PA (06.21.18 @ 22:33) >  IMPRESSION:   Clear lungs.    < end of copied text >    PROBLEM LIST:  68y Female with HEALTH ISSUES - PROBLEM Dx:  Hip Fracture  Asthma  Dementia    RECS:  -Asthma: not in exacerbation at this time, continue symbicort,  PRN duonebs Q6 hours.  -post op pain control, monitor mental status given hx of dementia  -Incentive neelima, PT.  -dc planning    Luciana Mancini MD  [phone]

## 2018-06-26 NOTE — PROGRESS NOTE ADULT - PROBLEM SELECTOR PLAN 3
pain meds as needed  follow for over sedation  would try to continue using IV tylenol with good pain relief and does not worsen constipation
pain meds as needed  follow for over sedation  would try to continue using IV tylenol with good pain relief and does not worsen constipation

## 2018-06-26 NOTE — PROGRESS NOTE ADULT - NSHPATTENDINGPLANDISCUSS_GEN_ALL_CORE
the patient and  at the bedside
the patient and  at the bedside
the patient and her daughters at the bedside
the patient and  at the bedside
the patient and  at the bedside

## 2018-06-26 NOTE — PROGRESS NOTE ADULT - PROVIDER SPECIALTY LIST ADULT
Internal Medicine
Orthopedics
Pulmonology
Internal Medicine

## 2018-06-26 NOTE — PROGRESS NOTE ADULT - PROBLEM SELECTOR PLAN 1
tolerated procedure well   continue physical therapy as tolerated
tolerated procedure well   continue physical therapy as tolerated

## 2018-06-26 NOTE — PROGRESS NOTE ADULT - ATTENDING COMMENTS
Except for hypotension, there have been no medical complications post-op to date. BP corrected with a transfusion and HCT is improved. Beginning to ambulate and doing well. No medical complications post-op to date and to proceed with physical therapy, as tolerated. Continues pulmonary toilet to lessen atelectasis, leg exercises as taught to lessen the risk of DVT and supervised pain medications for post-op pain control. I anticipate transfer to rehabilitation to follow when cleared by orthopedics.
Except for hypotension, there have been no medical complications post-op to date. BP corrected with a transfusion and HCT is improved. Will proceed with  physical therapy. Continues pulmonary toilet to lessen atelectasis, leg exercises as taught to lessen the risk of DVT and supervised pain medications for post-op pain control.
Except for hypotension, there have been no medical complications post-op to date. She must maintain bedrest until BP rises.  Will hold  physical therapy, at present. Continues pulmonary toilet to lessen atelectasis, leg exercises as taught to lessen the risk of DVT and supervised pain medications for post-op pain control.
Except for hypotension, there have been no medical complications post-op to date. BP corrected with a transfusion and HCT is improved. Beginning to ambulate and doing well. No medical complications post-op to date and to proceed with physical therapy, as tolerated. Continues pulmonary toilet to lessen atelectasis, leg exercises as taught to lessen the risk of DVT and supervised pain medications for post-op pain control. I anticipate transfer to rehabilitation to follow when cleared by orthopedics.
Except for transient hypotension, there have been no medical complications post-op to date. BP corrected with a transfusion and HCT is improved. Beginning to ambulate and doing well. No medical complications post-op to date and to proceed with physical therapy, as tolerated. Continues pulmonary toilet to lessen atelectasis, leg exercises as taught to lessen the risk of DVT and supervised pain medications for post-op pain control. Cleared by orthopedics for discharge to rehabilitation.  Full instructions provided regarding diagnosis, treatment, discharge medications, diet and follow up care on transfer sheet. Medically stable and for discharge to rehabilitation today as planned.

## 2018-06-26 NOTE — PROGRESS NOTE ADULT - SUBJECTIVE AND OBJECTIVE BOX
Orthopaedic Surgery Progress Note    Subjective:   Patient seen and examined  No acute events overnight, tolerated weight bearing  Endorsed mild itching at [facility] site    Objective:  T(C): 36.7 (06-26-18 @ 04:53), Max: 38.3 (06-25-18 @ 17:17)  HR: 86 (06-26-18 @ 04:53) (86 - 101)  BP: 148/84 (06-26-18 @ 04:53) (112/68 - 165/78)  RR: 18 (06-26-18 @ 04:53) (18 - 18)  SpO2: 96% (06-26-18 @ 04:53) (93% - 96%)  Wt(kg): --    06-24 @ 07:01  -  06-25 @ 07:00  --------------------------------------------------------  IN: 480 mL / OUT: 800 mL / NET: -320 mL    06-25 @ 07:01  -  06-26 @ 06:31  --------------------------------------------------------  IN: 500 mL / OUT: 0 mL / NET: 500 mL        PE    NAD  RLE, Aquacell C/D/I  Motor intact FHL/GS/TA/EHL  SILT S/S/SP/DP  WWP                          8.2    8.30  )-----------( 166      ( 25 Jun 2018 09:28 )             25.4     06-25    142  |  104  |  17  ----------------------------<  152<H>  3.9   |  26  |  0.73    Ca    9.2      25 Jun 2018 07:40        68y Female POD #4 s/p R Rachid for FN Fx   - Pain control PRN  - WBAT  - Posterior dislocation precautions  - PT/OT/OOB  - DVT ppx  - Serial CBCs to trend H&H, s/p 1U 6/24 (1U 6/23; 2U 6/22)  - Dispo planning Orthopaedic Surgery Progress Note    Subjective:   Patient seen and examined  No acute events overnight, tolerated weight bearing  Endorsed mild itching at [facility] site    Objective:  T(C): 36.7 (06-26-18 @ 04:53), Max: 38.3 (06-25-18 @ 17:17)  HR: 86 (06-26-18 @ 04:53) (86 - 101)  BP: 148/84 (06-26-18 @ 04:53) (112/68 - 165/78)  RR: 18 (06-26-18 @ 04:53) (18 - 18)  SpO2: 96% (06-26-18 @ 04:53) (93% - 96%)  Wt(kg): --    06-24 @ 07:01  -  06-25 @ 07:00  --------------------------------------------------------  IN: 480 mL / OUT: 800 mL / NET: -320 mL    06-25 @ 07:01  -  06-26 @ 06:31  --------------------------------------------------------  IN: 500 mL / OUT: 0 mL / NET: 500 mL        PE    NAD  RLE, Aquacell C/D/I  Motor intact FHL/GS/TA/EHL  SILT S/S/SP/DP  WWP                          8.2    8.30  )-----------( 166      ( 25 Jun 2018 09:28 )             25.4     06-25    142  |  104  |  17  ----------------------------<  152<H>  3.9   |  26  |  0.73    Ca    9.2      25 Jun 2018 07:40        68y Female POD #4 s/p R Rachid for FN Fx   - Pain control PRN  - WBAT  - Posterior dislocation precautions  - PT/OT/OOB  - DVT ppx  - Serial CBCs to trend H&H, s/p 1U 6/24 (1U 6/23; 2U 6/22)  - Dispo planning      Agree with above.  Pt ambulated yesterday.  Check labs today.  Dispo home.  Stewart Dukes MD  Orthopaedic Surgery  Team pager [phone]

## 2018-07-05 ENCOUNTER — RX RENEWAL (OUTPATIENT)
Age: 68
End: 2018-07-05

## 2018-07-09 ENCOUNTER — APPOINTMENT (OUTPATIENT)
Dept: ORTHOPEDIC SURGERY | Facility: CLINIC | Age: 68
End: 2018-07-09
Payer: MEDICARE

## 2018-07-09 VITALS — HEIGHT: 56 IN | WEIGHT: 100 LBS | BODY MASS INDEX: 22.5 KG/M2

## 2018-07-09 DIAGNOSIS — Z96.649 PRESENCE OF UNSPECIFIED ARTIFICIAL HIP JOINT: ICD-10-CM

## 2018-07-09 PROCEDURE — 99024 POSTOP FOLLOW-UP VISIT: CPT

## 2018-07-09 PROCEDURE — 73502 X-RAY EXAM HIP UNI 2-3 VIEWS: CPT | Mod: RT

## 2018-07-11 ENCOUNTER — RX RENEWAL (OUTPATIENT)
Age: 68
End: 2018-07-11

## 2018-07-12 ENCOUNTER — RECORD ABSTRACTING (OUTPATIENT)
Age: 68
End: 2018-07-12

## 2018-07-12 DIAGNOSIS — K86.89 OTHER SPECIFIED DISEASES OF PANCREAS: ICD-10-CM

## 2018-07-12 DIAGNOSIS — K21.9 GASTRO-ESOPHAGEAL REFLUX DISEASE W/OUT ESOPHAGITIS: ICD-10-CM

## 2018-07-12 DIAGNOSIS — E74.39 OTHER DISORDERS OF INTESTINAL CARBOHYDRATE ABSORPTION: ICD-10-CM

## 2018-07-12 DIAGNOSIS — J18.8: ICD-10-CM

## 2018-07-12 RX ORDER — LEVALBUTEROL HYDROCHLORIDE 1.25 MG/3ML
1.25 SOLUTION RESPIRATORY (INHALATION)
Refills: 0 | Status: ACTIVE | COMMUNITY

## 2018-07-12 RX ORDER — ALBUTEROL SULFATE 90 UG/1
108 (90 BASE) AEROSOL, METERED RESPIRATORY (INHALATION)
Refills: 0 | Status: ACTIVE | COMMUNITY

## 2018-07-13 ENCOUNTER — EMERGENCY (EMERGENCY)
Facility: HOSPITAL | Age: 68
LOS: 1 days | Discharge: HOME | End: 2018-07-13
Attending: EMERGENCY MEDICINE
Payer: MEDICARE

## 2018-07-13 VITALS
TEMPERATURE: 98 F | DIASTOLIC BLOOD PRESSURE: 73 MMHG | SYSTOLIC BLOOD PRESSURE: 140 MMHG | OXYGEN SATURATION: 95 % | RESPIRATION RATE: 18 BRPM | HEART RATE: 100 BPM

## 2018-07-13 DIAGNOSIS — Z96.659 PRESENCE OF UNSPECIFIED ARTIFICIAL KNEE JOINT: Chronic | ICD-10-CM

## 2018-07-13 DIAGNOSIS — Z98.42 CATARACT EXTRACTION STATUS, LEFT EYE: Chronic | ICD-10-CM

## 2018-07-13 DIAGNOSIS — Z90.710 ACQUIRED ABSENCE OF BOTH CERVIX AND UTERUS: Chronic | ICD-10-CM

## 2018-07-13 DIAGNOSIS — S42.293A OTHER DISPLACED FRACTURE OF UPPER END OF UNSPECIFIED HUMERUS, INITIAL ENCOUNTER FOR CLOSED FRACTURE: Chronic | ICD-10-CM

## 2018-07-13 PROCEDURE — 99283 EMERGENCY DEPT VISIT LOW MDM: CPT | Mod: GC

## 2018-07-13 PROCEDURE — 73522 X-RAY EXAM HIPS BI 3-4 VIEWS: CPT | Mod: 26

## 2018-07-13 PROCEDURE — 73522 X-RAY EXAM HIPS BI 3-4 VIEWS: CPT

## 2018-07-13 PROCEDURE — 73552 X-RAY EXAM OF FEMUR 2/>: CPT

## 2018-07-13 PROCEDURE — 99284 EMERGENCY DEPT VISIT MOD MDM: CPT

## 2018-07-13 PROCEDURE — 73552 X-RAY EXAM OF FEMUR 2/>: CPT | Mod: 26,LT

## 2018-07-13 RX ORDER — OXYCODONE HYDROCHLORIDE 5 MG/1
5 TABLET ORAL ONCE
Qty: 0 | Refills: 0 | Status: DISCONTINUED | OUTPATIENT
Start: 2018-07-13 | End: 2018-07-13

## 2018-07-13 RX ADMIN — OXYCODONE HYDROCHLORIDE 5 MILLIGRAM(S): 5 TABLET ORAL at 22:11

## 2018-07-13 NOTE — ED PROVIDER NOTE - OBJECTIVE STATEMENT
68F hx severe asthma (on chronic steroids), CVA (no deficits), spinal stenosis, recent R THR presents to ED with L hip pain. Pt had R THR about three weeks ago by Dr. Rey. She was completing rehab and doing well when yesterday suddenly developed pain in the L hip. She continued to try to ambulate, but the pain persisted to the point where could no longer bear weight on L leg and came in.

## 2018-07-13 NOTE — ED PROVIDER NOTE - ATTENDING CONTRIBUTION TO CARE
I performed a history and physical exam of the patient and discussed their management with the resident and /or advanced care provider. I reviewed the resident and /or ACP's note and agree with the documented findings and plan of care. My medical decison making and observations are found above.  Abd soft, low back pain, nl passive range of hip on rt

## 2018-07-13 NOTE — ED PROVIDER NOTE - CARE PLAN
Principal Discharge DX:	Hip pain, acute, left Principal Discharge DX:	Hip pain, acute, left  Goal:	follow up with Dr. Rey.  Assessment and plan of treatment:	xrays negative for acute fracture. Pt continues to have difficulty ambulating, but does not want to be admitted for further imaging. Has f/u appt on Monday. Has home health services, transportation chairs and someone with her 24 hours at home.

## 2018-07-13 NOTE — ED PROVIDER NOTE - PROGRESS NOTE DETAILS
Sign out follow-up: No acute hip fracture on radiology read. No left hip or femur bony TTP on exam. +Moderate pain when engaging left inner groin muscles. Offered patient and family admission for pain control/PT and reassessment by orthopedics as well as explained possibility of occult hip fx requiring MRI. Son acknowledged information and declined. He states patient has 24/7 supervision/assistance in house, ambulates with walker. Symptoms controlled by medical marijuana. Has f/u with her orthopedic surgeon in 2 days. MAGDA.

## 2018-07-13 NOTE — ED PROVIDER NOTE - MEDICAL DECISION MAKING DETAILS
p/w L hip pain unprovoked in setting of osteoporosis. Will check xrays, pain control. Reassess. p/w L hip pain unprovoked in setting of osteoporosis. Will check xrays, pain control. Reassess.  Darcie: hx of easy hip fx on rt now with pain on left, will xray pelvis and hip. treat pain

## 2018-07-13 NOTE — ED PROVIDER NOTE - PHYSICAL EXAMINATION
GENERAL: NAD  HEENT:  Atraumatic, Normocephalic  EYES: EOMI, PERRLA, conjunctiva and sclera clear  NECK: Supple, No JVD  EXTREMITIES:  2+ Peripheral Pulses, No clubbing, cyanosis, or edema   LLE not fixed or rotated, unable to flex hip 2/2 pain. Minimal pain with passive movement. Pain with internal rotation. Good ROM and minimal pain of the R hip.   PSYCH: AAOx3  SKIN: R hip hematoma with minimal drainage, bandage in place.

## 2018-07-13 NOTE — ED CLERICAL - NS ED CLERK NOTE PRE-ARRIVAL INFORMATION; ADDITIONAL PRE-ARRIVAL INFORMATION
This patient is enrolled in the comprehensive joint replacement (CJR) program and has active care navigation.  This patient can be followed up by the care navigation team within 24 hours. To arrange close follow-up or to obtain additional clinical information about this patient, please call the contact number above.   Please call the orthopedic resident ([phone]) for ALL patients who are admitted or placed in observation.

## 2018-07-13 NOTE — ED PROVIDER NOTE - PLAN OF CARE
follow up with Dr. Rey. xrays negative for acute fracture. Pt continues to have difficulty ambulating, but does not want to be admitted for further imaging. Has f/u appt on Monday. Has home health services, transportation chairs and someone with her 24 hours at home.

## 2018-07-14 VITALS
RESPIRATION RATE: 18 BRPM | HEART RATE: 82 BPM | SYSTOLIC BLOOD PRESSURE: 118 MMHG | DIASTOLIC BLOOD PRESSURE: 83 MMHG | OXYGEN SATURATION: 99 %

## 2018-07-14 RX ADMIN — OXYCODONE HYDROCHLORIDE 5 MILLIGRAM(S): 5 TABLET ORAL at 00:11

## 2018-07-14 NOTE — ED POST DISCHARGE NOTE - DETAILS
Patient in ED for L hip pain, sudden onset at rehab (Pt with recent R THR). Per EMR, pt with f/u with ortho surgeon in 2 days (tomorrow). Patient in ED for L hip pain, sudden onset at rehab (Pt with recent R THR). Per EMR, pt with f/u with ortho surgeon in 2 days (tomorrow). Spoke with ortho resident, Phani, who reviewed Xray. Reports if patient not having pain at site of fragment, may f/u with orthopedic at scheduled appointment. D/w Dr. Brown. Attempted to call patient but unable to reach or leave msg at this time. - Marah Garrett PA-C - Marah Garrett PA-C spoke with patients  who states patient in severe pain but is currently at the orthopedists office waiting to be seen.  given call back number if the physician has questions - Nicolasa Stern PA-C

## 2018-07-14 NOTE — ED POST DISCHARGE NOTE - RESULT SUMMARY
Radiology Discrepancy Reported On [place]: on XR Femur: R greater troch fx fragment now located 2.4cm cranial to donor site, new since 6-22. Ortho consult recommended.

## 2018-07-16 ENCOUNTER — APPOINTMENT (OUTPATIENT)
Dept: ORTHOPEDIC SURGERY | Facility: CLINIC | Age: 68
End: 2018-07-16
Payer: MEDICARE

## 2018-07-16 ENCOUNTER — RX RENEWAL (OUTPATIENT)
Age: 68
End: 2018-07-16

## 2018-07-16 PROCEDURE — 99024 POSTOP FOLLOW-UP VISIT: CPT

## 2018-07-16 RX ORDER — TRIAMTERENE AND HYDROCHLOROTHIAZIDE 37.5; 25 MG/1; MG/1
37.5-25 CAPSULE ORAL
Qty: 30 | Refills: 3 | Status: ACTIVE | COMMUNITY
Start: 2018-07-16 | End: 1900-01-01

## 2018-07-18 ENCOUNTER — RX RENEWAL (OUTPATIENT)
Age: 68
End: 2018-07-18

## 2018-07-21 ENCOUNTER — RX CHANGE (OUTPATIENT)
Age: 68
End: 2018-07-21

## 2018-07-23 ENCOUNTER — APPOINTMENT (OUTPATIENT)
Age: 68
End: 2018-07-23
Payer: MEDICARE

## 2018-07-23 VITALS — WEIGHT: 100 LBS | RESPIRATION RATE: 16 BRPM | HEIGHT: 56 IN | BODY MASS INDEX: 22.5 KG/M2

## 2018-07-23 DIAGNOSIS — S72.114D NONDISPLACED FRACTURE OF GREATER TROCHANTER OF RIGHT FEMUR, SUBSEQUENT ENCOUNTER FOR CLOSED FRACTURE WITH ROUTINE HEALING: ICD-10-CM

## 2018-07-23 DIAGNOSIS — S72.001D FRACTURE OF UNSPECIFIED PART OF NECK OF RIGHT FEMUR, SUBSEQUENT ENCOUNTER FOR CLOSED FRACTURE WITH ROUTINE HEALING: ICD-10-CM

## 2018-07-23 PROBLEM — I63.9 CEREBRAL INFARCTION, UNSPECIFIED: Chronic | Status: ACTIVE | Noted: 2018-03-08

## 2018-07-23 PROCEDURE — 72100 X-RAY EXAM L-S SPINE 2/3 VWS: CPT

## 2018-07-23 PROCEDURE — 99024 POSTOP FOLLOW-UP VISIT: CPT

## 2018-07-26 ENCOUNTER — INPATIENT (INPATIENT)
Facility: HOSPITAL | Age: 68
LOS: 69 days | Discharge: HOME | DRG: 466 | End: 2018-10-04
Attending: FAMILY MEDICINE | Admitting: FAMILY MEDICINE
Payer: MEDICARE

## 2018-07-26 VITALS — DIASTOLIC BLOOD PRESSURE: 78 MMHG | HEART RATE: 92 BPM | SYSTOLIC BLOOD PRESSURE: 149 MMHG

## 2018-07-26 DIAGNOSIS — A41.9 SEPSIS, UNSPECIFIED ORGANISM: ICD-10-CM

## 2018-07-26 DIAGNOSIS — R35.0 FREQUENCY OF MICTURITION: ICD-10-CM

## 2018-07-26 DIAGNOSIS — Z96.659 PRESENCE OF UNSPECIFIED ARTIFICIAL KNEE JOINT: Chronic | ICD-10-CM

## 2018-07-26 DIAGNOSIS — M54.42 LUMBAGO WITH SCIATICA, LEFT SIDE: ICD-10-CM

## 2018-07-26 DIAGNOSIS — E87.2 ACIDOSIS: ICD-10-CM

## 2018-07-26 DIAGNOSIS — G92 TOXIC ENCEPHALOPATHY: ICD-10-CM

## 2018-07-26 DIAGNOSIS — S32.9XXA FRACTURE OF UNSPECIFIED PARTS OF LUMBOSACRAL SPINE AND PELVIS, INITIAL ENCOUNTER FOR CLOSED FRACTURE: ICD-10-CM

## 2018-07-26 DIAGNOSIS — R74.8 ABNORMAL LEVELS OF OTHER SERUM ENZYMES: ICD-10-CM

## 2018-07-26 DIAGNOSIS — J45.909 UNSPECIFIED ASTHMA, UNCOMPLICATED: ICD-10-CM

## 2018-07-26 DIAGNOSIS — R29.898 OTHER SYMPTOMS AND SIGNS INVOLVING THE MUSCULOSKELETAL SYSTEM: ICD-10-CM

## 2018-07-26 DIAGNOSIS — E87.8 OTHER DISORDERS OF ELECTROLYTE AND FLUID BALANCE, NOT ELSEWHERE CLASSIFIED: ICD-10-CM

## 2018-07-26 DIAGNOSIS — R53.1 WEAKNESS: ICD-10-CM

## 2018-07-26 DIAGNOSIS — E16.2 HYPOGLYCEMIA, UNSPECIFIED: ICD-10-CM

## 2018-07-26 DIAGNOSIS — Z98.42 CATARACT EXTRACTION STATUS, LEFT EYE: Chronic | ICD-10-CM

## 2018-07-26 DIAGNOSIS — S42.293A OTHER DISPLACED FRACTURE OF UPPER END OF UNSPECIFIED HUMERUS, INITIAL ENCOUNTER FOR CLOSED FRACTURE: Chronic | ICD-10-CM

## 2018-07-26 DIAGNOSIS — Z90.710 ACQUIRED ABSENCE OF BOTH CERVIX AND UTERUS: Chronic | ICD-10-CM

## 2018-07-26 LAB
ALBUMIN SERPL ELPH-MCNC: 3.5 G/DL — SIGNIFICANT CHANGE UP (ref 3.3–5)
ALP SERPL-CCNC: 216 U/L — HIGH (ref 40–120)
ALT FLD-CCNC: 15 U/L — SIGNIFICANT CHANGE UP (ref 10–45)
ANION GAP SERPL CALC-SCNC: 16 MMOL/L — SIGNIFICANT CHANGE UP (ref 5–17)
ANION GAP SERPL CALC-SCNC: 16 MMOL/L — SIGNIFICANT CHANGE UP (ref 5–17)
ANION GAP SERPL CALC-SCNC: 18 MMOL/L — HIGH (ref 5–17)
APPEARANCE UR: CLEAR — SIGNIFICANT CHANGE UP
APTT BLD: 33 SEC — SIGNIFICANT CHANGE UP (ref 27.5–37.4)
AST SERPL-CCNC: 30 U/L — SIGNIFICANT CHANGE UP (ref 10–40)
BASOPHILS # BLD AUTO: 0 K/UL — SIGNIFICANT CHANGE UP (ref 0–0.2)
BILIRUB SERPL-MCNC: 0.7 MG/DL — SIGNIFICANT CHANGE UP (ref 0.2–1.2)
BILIRUB UR-MCNC: NEGATIVE — SIGNIFICANT CHANGE UP
BLD GP AB SCN SERPL QL: NEGATIVE — SIGNIFICANT CHANGE UP
BUN SERPL-MCNC: 28 MG/DL — HIGH (ref 7–23)
BUN SERPL-MCNC: 28 MG/DL — HIGH (ref 7–23)
BUN SERPL-MCNC: 30 MG/DL — HIGH (ref 7–23)
CALCIUM SERPL-MCNC: 7.8 MG/DL — LOW (ref 8.4–10.5)
CALCIUM SERPL-MCNC: 8.2 MG/DL — LOW (ref 8.4–10.5)
CALCIUM SERPL-MCNC: 8.5 MG/DL — SIGNIFICANT CHANGE UP (ref 8.4–10.5)
CHLORIDE SERPL-SCNC: 87 MMOL/L — LOW (ref 96–108)
CHLORIDE SERPL-SCNC: 88 MMOL/L — LOW (ref 96–108)
CHLORIDE SERPL-SCNC: 92 MMOL/L — LOW (ref 96–108)
CK MB BLD-MCNC: 3.1 % — SIGNIFICANT CHANGE UP (ref 0–3.5)
CK MB BLD-MCNC: 4.3 % — HIGH (ref 0–3.5)
CK MB CFR SERPL CALC: 3.8 NG/ML — SIGNIFICANT CHANGE UP (ref 0–3.8)
CK MB CFR SERPL CALC: 4.4 NG/ML — HIGH (ref 0–3.8)
CK SERPL-CCNC: 102 U/L — SIGNIFICANT CHANGE UP (ref 25–170)
CK SERPL-CCNC: 121 U/L — SIGNIFICANT CHANGE UP (ref 25–170)
CO2 SERPL-SCNC: 16 MMOL/L — LOW (ref 22–31)
CO2 SERPL-SCNC: 18 MMOL/L — LOW (ref 22–31)
CO2 SERPL-SCNC: 20 MMOL/L — LOW (ref 22–31)
COLOR SPEC: YELLOW — SIGNIFICANT CHANGE UP
CREAT SERPL-MCNC: 1.04 MG/DL — SIGNIFICANT CHANGE UP (ref 0.5–1.3)
CREAT SERPL-MCNC: 1.06 MG/DL — SIGNIFICANT CHANGE UP (ref 0.5–1.3)
CREAT SERPL-MCNC: 1.18 MG/DL — SIGNIFICANT CHANGE UP (ref 0.5–1.3)
DIFF PNL FLD: NEGATIVE — SIGNIFICANT CHANGE UP
EOSINOPHIL # BLD AUTO: 0 K/UL — SIGNIFICANT CHANGE UP (ref 0–0.5)
GAS PNL BLDV: SIGNIFICANT CHANGE UP
GAS PNL BLDV: SIGNIFICANT CHANGE UP
GLUCOSE BLDC GLUCOMTR-MCNC: 121 MG/DL — HIGH (ref 70–99)
GLUCOSE BLDC GLUCOMTR-MCNC: 127 MG/DL — HIGH (ref 70–99)
GLUCOSE SERPL-MCNC: 149 MG/DL — HIGH (ref 70–99)
GLUCOSE SERPL-MCNC: 165 MG/DL — HIGH (ref 70–99)
GLUCOSE SERPL-MCNC: 82 MG/DL — SIGNIFICANT CHANGE UP (ref 70–99)
GLUCOSE UR QL: NEGATIVE — SIGNIFICANT CHANGE UP
HCT VFR BLD CALC: 32.3 % — LOW (ref 34.5–45)
HGB BLD-MCNC: 11 G/DL — LOW (ref 11.5–15.5)
INR BLD: 1.48 RATIO — HIGH (ref 0.88–1.16)
KETONES UR-MCNC: NEGATIVE — SIGNIFICANT CHANGE UP
LEUKOCYTE ESTERASE UR-ACNC: NEGATIVE — SIGNIFICANT CHANGE UP
LYMPHOCYTES # BLD AUTO: 0.3 K/UL — LOW (ref 1–3.3)
MAGNESIUM SERPL-MCNC: 1.4 MG/DL — LOW (ref 1.6–2.6)
MAGNESIUM SERPL-MCNC: 2.1 MG/DL — SIGNIFICANT CHANGE UP (ref 1.6–2.6)
MCHC RBC-ENTMCNC: 29.1 PG — SIGNIFICANT CHANGE UP (ref 27–34)
MCHC RBC-ENTMCNC: 34 GM/DL — SIGNIFICANT CHANGE UP (ref 32–36)
MCV RBC AUTO: 85.6 FL — SIGNIFICANT CHANGE UP (ref 80–100)
MONOCYTES # BLD AUTO: 0.6 K/UL — SIGNIFICANT CHANGE UP (ref 0–0.9)
MONOCYTES NFR BLD AUTO: 7 % — SIGNIFICANT CHANGE UP (ref 2–14)
NEUTROPHILS # BLD AUTO: 20.4 K/UL — HIGH (ref 1.8–7.4)
NEUTROPHILS NFR BLD AUTO: 73 % — SIGNIFICANT CHANGE UP (ref 43–77)
NITRITE UR-MCNC: NEGATIVE — SIGNIFICANT CHANGE UP
PH UR: 6 — SIGNIFICANT CHANGE UP (ref 5–8)
PLATELET # BLD AUTO: 270 K/UL — SIGNIFICANT CHANGE UP (ref 150–400)
POTASSIUM SERPL-MCNC: 3.4 MMOL/L — LOW (ref 3.5–5.3)
POTASSIUM SERPL-MCNC: 4.7 MMOL/L — SIGNIFICANT CHANGE UP (ref 3.5–5.3)
POTASSIUM SERPL-MCNC: 4.9 MMOL/L — SIGNIFICANT CHANGE UP (ref 3.5–5.3)
POTASSIUM SERPL-SCNC: 3.4 MMOL/L — LOW (ref 3.5–5.3)
POTASSIUM SERPL-SCNC: 4.7 MMOL/L — SIGNIFICANT CHANGE UP (ref 3.5–5.3)
POTASSIUM SERPL-SCNC: 4.9 MMOL/L — SIGNIFICANT CHANGE UP (ref 3.5–5.3)
PROT SERPL-MCNC: 6.2 G/DL — SIGNIFICANT CHANGE UP (ref 6–8.3)
PROT UR-MCNC: SIGNIFICANT CHANGE UP
PROTHROM AB SERPL-ACNC: 16.3 SEC — HIGH (ref 9.8–12.7)
RBC # BLD: 3.77 M/UL — LOW (ref 3.8–5.2)
RBC # FLD: 18.5 % — HIGH (ref 10.3–14.5)
RBC CASTS # UR COMP ASSIST: SIGNIFICANT CHANGE UP /HPF (ref 0–2)
RH IG SCN BLD-IMP: NEGATIVE — SIGNIFICANT CHANGE UP
SODIUM SERPL-SCNC: 120 MMOL/L — CRITICAL LOW (ref 135–145)
SODIUM SERPL-SCNC: 125 MMOL/L — LOW (ref 135–145)
SODIUM SERPL-SCNC: 126 MMOL/L — LOW (ref 135–145)
SP GR SPEC: 1.02 — SIGNIFICANT CHANGE UP (ref 1.01–1.02)
TROPONIN T, HIGH SENSITIVITY RESULT: 116 NG/L — HIGH (ref 0–51)
TROPONIN T, HIGH SENSITIVITY RESULT: 50 NG/L — SIGNIFICANT CHANGE UP (ref 0–51)
TROPONIN T, HIGH SENSITIVITY RESULT: 90 NG/L — HIGH (ref 0–51)
UROBILINOGEN FLD QL: NEGATIVE — SIGNIFICANT CHANGE UP
WBC # BLD: 21.3 K/UL — HIGH (ref 3.8–10.5)
WBC # FLD AUTO: 21.3 K/UL — HIGH (ref 3.8–10.5)
WBC UR QL: SIGNIFICANT CHANGE UP /HPF (ref 0–5)

## 2018-07-26 PROCEDURE — 70450 CT HEAD/BRAIN W/O DYE: CPT | Mod: 26

## 2018-07-26 PROCEDURE — 73502 X-RAY EXAM HIP UNI 2-3 VIEWS: CPT | Mod: 26,RT

## 2018-07-26 PROCEDURE — 71045 X-RAY EXAM CHEST 1 VIEW: CPT | Mod: 26

## 2018-07-26 PROCEDURE — 99232 SBSQ HOSP IP/OBS MODERATE 35: CPT

## 2018-07-26 PROCEDURE — 99223 1ST HOSP IP/OBS HIGH 75: CPT

## 2018-07-26 PROCEDURE — 74176 CT ABD & PELVIS W/O CONTRAST: CPT | Mod: 26

## 2018-07-26 PROCEDURE — 93010 ELECTROCARDIOGRAM REPORT: CPT | Mod: 76

## 2018-07-26 PROCEDURE — 71250 CT THORAX DX C-: CPT | Mod: 26

## 2018-07-26 PROCEDURE — 99285 EMERGENCY DEPT VISIT HI MDM: CPT | Mod: 25,GC

## 2018-07-26 PROCEDURE — 72190 X-RAY EXAM OF PELVIS: CPT | Mod: 26,59

## 2018-07-26 RX ORDER — MAGNESIUM SULFATE 500 MG/ML
2 VIAL (ML) INJECTION ONCE
Qty: 0 | Refills: 0 | Status: COMPLETED | OUTPATIENT
Start: 2018-07-26 | End: 2018-07-26

## 2018-07-26 RX ORDER — LANOLIN ALCOHOL/MO/W.PET/CERES
3 CREAM (GRAM) TOPICAL AT BEDTIME
Qty: 0 | Refills: 0 | Status: DISCONTINUED | OUTPATIENT
Start: 2018-07-26 | End: 2018-07-27

## 2018-07-26 RX ORDER — OXYCODONE HYDROCHLORIDE 5 MG/1
10 TABLET ORAL EVERY 4 HOURS
Qty: 0 | Refills: 0 | Status: DISCONTINUED | OUTPATIENT
Start: 2018-07-26 | End: 2018-07-27

## 2018-07-26 RX ORDER — CLOPIDOGREL BISULFATE 75 MG/1
75 TABLET, FILM COATED ORAL DAILY
Qty: 0 | Refills: 0 | Status: DISCONTINUED | OUTPATIENT
Start: 2018-07-26 | End: 2018-07-26

## 2018-07-26 RX ORDER — ACETAMINOPHEN 500 MG
1000 TABLET ORAL ONCE
Qty: 0 | Refills: 0 | Status: COMPLETED | OUTPATIENT
Start: 2018-07-26 | End: 2018-07-26

## 2018-07-26 RX ORDER — CLONAZEPAM 1 MG
2 TABLET ORAL AT BEDTIME
Qty: 0 | Refills: 0 | Status: DISCONTINUED | OUTPATIENT
Start: 2018-07-26 | End: 2018-07-27

## 2018-07-26 RX ORDER — CLONAZEPAM 1 MG
1 TABLET ORAL
Qty: 0 | Refills: 0 | COMMUNITY

## 2018-07-26 RX ORDER — DEXTROSE 50 % IN WATER 50 %
50 SYRINGE (ML) INTRAVENOUS ONCE
Qty: 0 | Refills: 0 | Status: COMPLETED | OUTPATIENT
Start: 2018-07-26 | End: 2018-07-26

## 2018-07-26 RX ORDER — ZILEUTON 600 MG/1
1 TABLET, MULTILAYER, EXTENDED RELEASE ORAL
Qty: 0 | Refills: 0 | COMMUNITY

## 2018-07-26 RX ORDER — ENOXAPARIN SODIUM 100 MG/ML
40 INJECTION SUBCUTANEOUS EVERY 24 HOURS
Qty: 0 | Refills: 0 | Status: DISCONTINUED | OUTPATIENT
Start: 2018-07-26 | End: 2018-07-26

## 2018-07-26 RX ORDER — SODIUM CHLORIDE 9 MG/ML
500 INJECTION INTRAMUSCULAR; INTRAVENOUS; SUBCUTANEOUS ONCE
Qty: 0 | Refills: 0 | Status: COMPLETED | OUTPATIENT
Start: 2018-07-26 | End: 2018-07-26

## 2018-07-26 RX ORDER — MIRTAZAPINE 45 MG/1
45 TABLET, ORALLY DISINTEGRATING ORAL DAILY
Qty: 0 | Refills: 0 | Status: DISCONTINUED | OUTPATIENT
Start: 2018-07-26 | End: 2018-07-27

## 2018-07-26 RX ORDER — DEXTROSE 50 % IN WATER 50 %
12.5 SYRINGE (ML) INTRAVENOUS ONCE
Qty: 0 | Refills: 0 | Status: DISCONTINUED | OUTPATIENT
Start: 2018-07-26 | End: 2018-07-27

## 2018-07-26 RX ORDER — BUDESONIDE AND FORMOTEROL FUMARATE DIHYDRATE 160; 4.5 UG/1; UG/1
2 AEROSOL RESPIRATORY (INHALATION)
Qty: 0 | Refills: 0 | Status: DISCONTINUED | OUTPATIENT
Start: 2018-07-26 | End: 2018-07-27

## 2018-07-26 RX ORDER — DEXTROSE 50 % IN WATER 50 %
15 SYRINGE (ML) INTRAVENOUS ONCE
Qty: 0 | Refills: 0 | Status: DISCONTINUED | OUTPATIENT
Start: 2018-07-26 | End: 2018-07-27

## 2018-07-26 RX ORDER — CLONAZEPAM 1 MG
2 TABLET ORAL
Qty: 0 | Refills: 0 | COMMUNITY

## 2018-07-26 RX ORDER — SERTRALINE 25 MG/1
50 TABLET, FILM COATED ORAL DAILY
Qty: 0 | Refills: 0 | Status: DISCONTINUED | OUTPATIENT
Start: 2018-07-26 | End: 2018-07-27

## 2018-07-26 RX ORDER — ACETAMINOPHEN 500 MG
650 TABLET ORAL EVERY 6 HOURS
Qty: 0 | Refills: 0 | Status: DISCONTINUED | OUTPATIENT
Start: 2018-07-26 | End: 2018-07-27

## 2018-07-26 RX ORDER — VANCOMYCIN HCL 1 G
1000 VIAL (EA) INTRAVENOUS ONCE
Qty: 0 | Refills: 0 | Status: COMPLETED | OUTPATIENT
Start: 2018-07-26 | End: 2018-07-26

## 2018-07-26 RX ORDER — SODIUM CHLORIDE 9 MG/ML
1000 INJECTION, SOLUTION INTRAVENOUS
Qty: 0 | Refills: 0 | Status: DISCONTINUED | OUTPATIENT
Start: 2018-07-26 | End: 2018-07-27

## 2018-07-26 RX ORDER — QUETIAPINE FUMARATE 200 MG/1
50 TABLET, FILM COATED ORAL AT BEDTIME
Qty: 0 | Refills: 0 | Status: DISCONTINUED | OUTPATIENT
Start: 2018-07-26 | End: 2018-07-27

## 2018-07-26 RX ORDER — SODIUM CHLORIDE 5 G/100ML
1000 INJECTION, SOLUTION INTRAVENOUS
Qty: 0 | Refills: 0 | Status: DISCONTINUED | OUTPATIENT
Start: 2018-07-26 | End: 2018-07-27

## 2018-07-26 RX ORDER — DEXTROSE 50 % IN WATER 50 %
25 SYRINGE (ML) INTRAVENOUS ONCE
Qty: 0 | Refills: 0 | Status: DISCONTINUED | OUTPATIENT
Start: 2018-07-26 | End: 2018-07-27

## 2018-07-26 RX ORDER — MORPHINE SULFATE 50 MG/1
2 CAPSULE, EXTENDED RELEASE ORAL EVERY 4 HOURS
Qty: 0 | Refills: 0 | Status: DISCONTINUED | OUTPATIENT
Start: 2018-07-26 | End: 2018-07-27

## 2018-07-26 RX ORDER — ACETAMINOPHEN 500 MG
650 TABLET ORAL ONCE
Qty: 0 | Refills: 0 | Status: COMPLETED | OUTPATIENT
Start: 2018-07-26 | End: 2018-07-26

## 2018-07-26 RX ORDER — SODIUM CHLORIDE 9 MG/ML
1500 INJECTION INTRAMUSCULAR; INTRAVENOUS; SUBCUTANEOUS ONCE
Qty: 0 | Refills: 0 | Status: COMPLETED | OUTPATIENT
Start: 2018-07-26 | End: 2018-07-26

## 2018-07-26 RX ORDER — TIOTROPIUM BROMIDE 18 UG/1
1 CAPSULE ORAL; RESPIRATORY (INHALATION)
Qty: 0 | Refills: 0 | COMMUNITY

## 2018-07-26 RX ORDER — POTASSIUM CHLORIDE 20 MEQ
40 PACKET (EA) ORAL ONCE
Qty: 0 | Refills: 0 | Status: COMPLETED | OUTPATIENT
Start: 2018-07-26 | End: 2018-07-26

## 2018-07-26 RX ORDER — GLUCAGON INJECTION, SOLUTION 0.5 MG/.1ML
1 INJECTION, SOLUTION SUBCUTANEOUS ONCE
Qty: 0 | Refills: 0 | Status: DISCONTINUED | OUTPATIENT
Start: 2018-07-26 | End: 2018-07-27

## 2018-07-26 RX ORDER — VANCOMYCIN HCL 1 G
750 VIAL (EA) INTRAVENOUS EVERY 12 HOURS
Qty: 0 | Refills: 0 | Status: DISCONTINUED | OUTPATIENT
Start: 2018-07-26 | End: 2018-07-27

## 2018-07-26 RX ORDER — SENNA PLUS 8.6 MG/1
2 TABLET ORAL AT BEDTIME
Qty: 0 | Refills: 0 | Status: DISCONTINUED | OUTPATIENT
Start: 2018-07-26 | End: 2018-07-27

## 2018-07-26 RX ORDER — DOCUSATE SODIUM 100 MG
100 CAPSULE ORAL DAILY
Qty: 0 | Refills: 0 | Status: DISCONTINUED | OUTPATIENT
Start: 2018-07-26 | End: 2018-07-27

## 2018-07-26 RX ORDER — SODIUM CHLORIDE 9 MG/ML
1500 INJECTION, SOLUTION INTRAVENOUS ONCE
Qty: 0 | Refills: 0 | Status: DISCONTINUED | OUTPATIENT
Start: 2018-07-26 | End: 2018-07-26

## 2018-07-26 RX ORDER — MEROPENEM 1 G/30ML
500 INJECTION INTRAVENOUS ONCE
Qty: 0 | Refills: 0 | Status: COMPLETED | OUTPATIENT
Start: 2018-07-26 | End: 2018-07-26

## 2018-07-26 RX ORDER — CEFEPIME 1 G/1
1000 INJECTION, POWDER, FOR SOLUTION INTRAMUSCULAR; INTRAVENOUS EVERY 12 HOURS
Qty: 0 | Refills: 0 | Status: DISCONTINUED | OUTPATIENT
Start: 2018-07-26 | End: 2018-07-27

## 2018-07-26 RX ADMIN — Medication 50 MILLILITER(S): at 12:37

## 2018-07-26 RX ADMIN — Medication 50 MILLILITER(S): at 12:05

## 2018-07-26 RX ADMIN — SODIUM CHLORIDE 40 MILLILITER(S): 5 INJECTION, SOLUTION INTRAVENOUS at 22:29

## 2018-07-26 RX ADMIN — Medication 3 MILLIGRAM(S): at 22:29

## 2018-07-26 RX ADMIN — Medication 50 GRAM(S): at 16:13

## 2018-07-26 RX ADMIN — SODIUM CHLORIDE 500 MILLILITER(S): 9 INJECTION INTRAMUSCULAR; INTRAVENOUS; SUBCUTANEOUS at 13:08

## 2018-07-26 RX ADMIN — MEROPENEM 100 MILLIGRAM(S): 1 INJECTION INTRAVENOUS at 16:47

## 2018-07-26 RX ADMIN — MIRTAZAPINE 45 MILLIGRAM(S): 45 TABLET, ORALLY DISINTEGRATING ORAL at 22:46

## 2018-07-26 RX ADMIN — Medication 250 MILLIGRAM(S): at 21:05

## 2018-07-26 RX ADMIN — Medication 2 MILLIGRAM(S): at 22:45

## 2018-07-26 RX ADMIN — Medication 650 MILLIGRAM(S): at 22:29

## 2018-07-26 RX ADMIN — Medication 40 MILLIEQUIVALENT(S): at 16:15

## 2018-07-26 RX ADMIN — MEROPENEM 100 MILLIGRAM(S): 1 INJECTION INTRAVENOUS at 16:27

## 2018-07-26 RX ADMIN — QUETIAPINE FUMARATE 50 MILLIGRAM(S): 200 TABLET, FILM COATED ORAL at 22:29

## 2018-07-26 RX ADMIN — SODIUM CHLORIDE 1000 MILLILITER(S): 9 INJECTION INTRAMUSCULAR; INTRAVENOUS; SUBCUTANEOUS at 16:15

## 2018-07-26 RX ADMIN — SENNA PLUS 2 TABLET(S): 8.6 TABLET ORAL at 21:07

## 2018-07-26 RX ADMIN — OXYCODONE HYDROCHLORIDE 10 MILLIGRAM(S): 5 TABLET ORAL at 21:07

## 2018-07-26 RX ADMIN — Medication 400 MILLIGRAM(S): at 17:54

## 2018-07-26 NOTE — H&P ADULT - PROBLEM SELECTOR PLAN 7
Pt does not take antihyperglycemics at home but was persistenly hypoglycemic in ED. Now resolved.  Check Q4hrs finger sticks and if < 70 persistently would start Dextrose 5% @30mls/hr as above.  Unclear what the etiology of this is except for decreased PO intake Pt does not take antihyperglycemics at home but was persistently hypoglycemic in ED. Now resolved.  Check Q4hrs finger sticks and if < 70 persistently would start Dextrose 5% @30mls/hr as above.  Unclear what the etiology of this is except for decreased PO intake

## 2018-07-26 NOTE — H&P ADULT - NSHPPHYSICALEXAM_GEN_ALL_CORE
Vital Signs Last 24 Hrs  T(C): 37.6 (26 Jul 2018 11:54), Max: 37.6 (26 Jul 2018 11:54)  T(F): 99.6 (26 Jul 2018 11:54), Max: 99.6 (26 Jul 2018 11:54)  HR: 60 (26 Jul 2018 11:54) (60 - 92)  BP: 110/58 (26 Jul 2018 11:54) (110/58 - 149/78)  BP(mean): --  RR: 18 (26 Jul 2018 11:54) (18 - 18)  SpO2: --

## 2018-07-26 NOTE — H&P ADULT - NEGATIVE NEUROLOGICAL SYMPTOMS
no vertigo/no hemiparesis/no facial palsy/no generalized seizures/no transient paralysis/no focal seizures/no syncope/no headache/no paresthesias

## 2018-07-26 NOTE — H&P ADULT - MOTOR
No DTR on b/l knees, down going babinski on left and unclear on right as has toe deformity  3+ brachiradialis and biceps tendons    B/L LE 1/5 strength symmetrically.   UE strength is 4/5 B/L

## 2018-07-26 NOTE — ED PROVIDER NOTE - ATTENDING CONTRIBUTION TO CARE
68F with extensive pmh including CVA, HLD, pulm HTN, UC, dementia, asthma on prednisone, biba with gen weakness, with episode of confusion overnight and inability to ambulate. per  currently at baseline. pt is ~1 mo s/p R hip replacement by Dr. Rey. pt denies any complaints at this time other than chronic low back pain and bilat hip pain. per  no f/c, complaints of cp, sob, abd pain, n/v/d.  denies urinary or fecal incontinence.    PE: Elderly female in NAD, NAD, NCAT, MMM, Trachea midline, Normal conjunctiva, lungs CTAB, S1/S2 RRR, Normal perfusion, 2+ radial pulses bilat, Abdomen Soft, NTND, No rebound/guarding, No LE edema. 1+ DP pulses bilat LE. sensation grossly intact bilat LE. 5/5 foot dorsiflexion/plantarflexion bilat LE. hip flexion bilat limited 2/2 pain. ?midline lower lumbar ttp. +healing scar to R hip, CDI, no palpable effusion, no warmth, minimal ttp at site. +pain with passive ROM L hip.     68F with multiple medical problems as noted, s/p R hip replacement one mo ago, presents with gen weakness, s/p episode of confusion overnight resolved. at baseline currently per . afebrile with borderline tachycardia. ekg without sig changes from previous. initial fs with finding of sig hypoglycemia, total 2 amps d50 given, although ?malfunction of glucometer as giving variable readings, glucose ~80 on vbg, improved on subsequent FS with diff machine (erroneous reading vs improved with d50?). to monitor. given sx, tachycardia, weakness, concern for neurologic, metabolic, infectious etiologies of sx. check cbc, cmp, vbg/lactate, blood cultures, ua/culture, cxr, xray pelvis/R hip given recent surgical procedure. check troponin. must consider possible surgical site infection however without clear signs of infection on exam, plan to likely consult ortho. give IVF. plan to admit. - Everardo Arriaga MD

## 2018-07-26 NOTE — ED CLERICAL - CLERICAL COMMENTS
This patient is enrolled in the comprehensive joint replacement (CJR) program and has active care navigation.  This patient can be followed up by the care navigation team within 24 hours. To arrange close follow-up or to obtain additional clinical information about this patient, please call the contact number above. Please call the orthopedic resident ([phone]) for ALL patients who are admitted or placed in observation.

## 2018-07-26 NOTE — H&P ADULT - HISTORY OF PRESENT ILLNESS
68F pmhx of asthma, anxiety/depression, HLD, UC, pHTN presents with weakness.  She was recently hospitalized here for a right hip fracture s/p repair last month. 68F pmhx of asthma, anxiety/depression, HLD, UC, pHTN presents with weakness.  She was recently hospitalized here for a right hip fracture s/p repair last month.  She is chrissie in by her family who say that on Monday she had the staples removed from her hip surgery.  She was 2 weeks ago developed a hematoma on the right hip after using Plavix with Lovenox for PPX after orhto surgery so this was stopped.  She was doing fine this week until last night when she said that she had to go to the bathroom "30 times" per the  and could not urinate or have BM.  She kept trying to get out of bed and walk to the bathroom and she usually walks with a walker with some assistance but last night she actuely became weak and not able to walk with walker and 2 person assist.  She also was brought to the ED 2 weeks ago for new onset severe back pain with radiation to her left hip.  Her  says this presentation was similar to when she broke her right hip last month (has osteoperosis) and was concerned again but after ED visit said she did not break her hip.  She has been taking her Oxycodone since the surgery Q4hrs w/o pain relief.  Last night she developed rigors, did not take temperature, urinary frequency, increased confusion and agitation along with back pain and LE weakness so she was brought days.     In ED:  T 99.6 rectally HR 60-92 /58 RR 18  She was seen by orthopedics for concern of joint infection in the right hip, given Meropenum and Vancomycin, 2LNs and also 2 amps of D50 for symptomatic (AMS) hypoglycemia.

## 2018-07-26 NOTE — H&P ADULT - NSHPLABSRESULTS_GEN_ALL_CORE
125<L>  |  87<L>  |  30<H>  ----------------------------<  82  3.4<L>   |  20<L>  |  1.18    Ca    8.5      2018 12:39  Mg     1.4         TPro  6.2  /  Alb  3.5  /  TBili  0.7  /  DBili  x   /  AST  30  /  ALT  15  /  AlkPhos  216<H>                              11.0   21.3  )-----------( 270      ( 2018 12:39 )             32.3       CARDIAC MARKERS ( 2018 13:52 )  x     / x     / 102 U/L / x     / 4.4 ng/mL        LIVER FUNCTIONS - ( 2018 12:39 )  Alb: 3.5 g/dL / Pro: 6.2 g/dL / ALK PHOS: 216 U/L / ALT: 15 U/L / AST: 30 U/L / GGT: x                 Urinalysis Basic - ( 2018 13:32 )    Color: Yellow / Appearance: Clear / S.023 / pH: x  Gluc: x / Ketone: Negative  / Bili: Negative / Urobili: Negative   Blood: x / Protein: Trace / Nitrite: Negative   Leuk Esterase: Negative / RBC: 0-2 /HPF / WBC 3-5 /HPF   Sq Epi: x / Non Sq Epi: x / Bacteria: x

## 2018-07-26 NOTE — H&P ADULT - PROBLEM SELECTOR PLAN 2
Severe sepsis 2/2 possible infected joint or spinal abscess vs. less likely UTI.  Pt has new onset severe back pain and LLE weakness of sudden onset last night with rigors, fevers, chills and significant bandemia, leukocytosis, tachycardia and lactate >2.   -Urgent MRI spine, F/U CT c/a/p w/o contrast as pt has anaphylaxis to contrast.  Check blood cultures and urine cultures and F/U ortho recs  -Received 2L NS in ED and repeat lactate is downtrending.   -Covered with Vancomycin 1G IV and Meropenem to cover hospital aquired staph and gram negatives given recent instrumentation and significant bandemia.   -Check Right hip US given induration of surgical site, but lower suspicion as she is not having significant pain or drainage here.  Will F/U ortho recs for this as well.   -Continue Abx as above until we have clear source. Severe sepsis 2/2 possible infected joint or spinal abscess vs. less likely UTI.  Pt has new onset severe back pain and LLE weakness of sudden onset last night with rigors, fevers, chills and significant bandemia, leukocytosis, tachycardia and lactate >2.   -Urgent MRI spine, F/U CT c/a/p w/o contrast as pt has anaphylaxis to contrast.  Check blood cultures and urine cultures and F/U ortho recs  -Received 2L NS in ED and repeat lactate is downtrending.   -Covered with Vancomycin 1G IV and Meropenem to cover hospital aquired staph and gram negatives given recent instrumentation and significant bandemia.   -Check Right hip US given induration of surgical site, but lower suspicion as she is not having significant pain or drainage here.  Will F/U ortho recs for this as well.   -Continue Abx as above until we have clear source.  -Vanco level check before tomorrow's PM dose goal 15-20. Severe sepsis 2/2 possible infected joint or spinal abscess vs. less likely UTI.  Pt has new onset severe back pain and LLE weakness of sudden onset last night due to pelvic fracture but also with rigors, fevers, chills and significant bandemia, leukocytosis, tachycardia and lactate >2.   -Urgent MRI spine, F/U CT c/a/p w/o contrast as pt has anaphylaxis to contrast.  Check blood cultures and urine cultures and F/U ortho recs  -Received 2L NS in ED and repeat lactate is downtrending.   -Covered with Vancomycin 1G IV and Meropenem to cover hospital acquired staph and gram negatives given recent instrumentation and significant bandemia.   -Check Right hip US given induration of surgical site, but lower suspicion as she is not having significant pain or drainage here.  Will F/U ortho recs for this as well.   -Continue Abx as above until we have clear source.  -Vanco level check before tomorrow's PM dose goal 15-20.

## 2018-07-26 NOTE — H&P ADULT - PROBLEM SELECTOR PLAN 8
Trop already peaked  EKG stat, could not find in ED or chart  No CP at this time so unlikely acute ACS however would still like EKG. Trop already peaked  EKG stat, could not find in ED or chart  No CP at this time so unlikely acute ACS however would still like EKG.  High risk for procedure tonight.

## 2018-07-26 NOTE — ED PROVIDER NOTE - SHIFT CHANGE DETAILS
Dr. Arias (Attending Physician)  Signed out to me with CT pending.  Found to have right lateral hip collection left acetabular fx, sternal fx. Spoke with ortho regarding possible infectious source.  Trauma consulted for multiple fractures.

## 2018-07-26 NOTE — H&P ADULT - PROBLEM SELECTOR PLAN 3
urgent MRI to r/o spinal abscess in severe sepsis with acute back pain and LLE weakness with significant bandemia and recent instrumentation.  Pt cannot take contrast due to anaphylaxis.   Oxycodone 10mg Q4hrs standing (pts home dose since surgery) and Morphine 2mg IV Q2hrs PRN breakthrough pain Vertebral fracture along with pelvic fracture likely cause but in septic setting will also get MRI to r/o spinal abscess  Pt cannot take contrast due to anaphylaxis.   Oxycodone 10mg Q4hrs standing (pts home dose since surgery) and Morphine 2mg IV Q2hrs PRN breakthrough pain

## 2018-07-26 NOTE — H&P ADULT - PROBLEM SELECTOR PLAN 9
Pt does take Magnesium at home, but was found to be hypomagnesemic to 1.4 which is low, but if she is lower that this at home this could be a cause of encephalopathy and muscle weakness.  Replete to goal > 4. Pt does take Magnesium at home, but was found to be hypomagnesemic to 1.4 which is low, but if she is lower that this at home this could be a cause of encephalopathy and muscle weakness.  Replete to goal > 4.  # Depression  Continue Sertraline 50mg PO daily  klonopin 2mg QHS  Melatonin Qhs  -Mirtazapine 45mg QH    #Alzheimers dementia  Continue Quietiapine 50mg QHS    # Chronic pain  Pt uses Medical marijuana per : 1ml TID of canibis oil.  Will defer to PMD if he wants to continue this.    #Right hip replacement  Lovenox PPX was stopped after she developed a hematoma in her right thigh and she was continued on the Plavix. WIll restart Lovenox here under supervision but DC if any signs of bleeding.  THis induration on the right thigh could also be remnant of this hematoma however I was not present for that examination and abscess cannot be excluded in this situation. Will order US of the area.

## 2018-07-26 NOTE — ED PROVIDER NOTE - NEUROLOGICAL, MLM
Alert and oriented x2, disoriented to time, follows commands, no focal deficits, no motor or sensory deficits, normal rectal tone

## 2018-07-26 NOTE — H&P ADULT - PROBLEM SELECTOR PLAN 6
Lactic acidosis vs. starvation ketosis is likely setting of AGMA in setting of severe sepsis.  Lactate repeated in ED and is downtrending.   F/U am BMP

## 2018-07-26 NOTE — CHART NOTE - NSCHARTNOTEFT_GEN_A_CORE
MEDICINE NP    KUMAR, MYRIAM  68y Female    Patient is a 68y old  Female who presents with a chief complaint of weakness (26 Jul 2018 16:54)       > Event Summary:  Notified by RN, Patient with "shaking" throughout and seen at bedside.    Patient appears frail, AAOX3 w/ confusion, unable to contribute.  Does report pain to hips w/ turning/ positioning.    and daughter at bedside.  Patient noted w/ diffuse shaking, skin is warm to touch.  Inguinal Temp 100.2        -Vital Signs Last 24 Hrs  T(C): 37.9 (26 Jul 2018 22:15), Max: 37.9 (26 Jul 2018 22:15)  T(F): 100.2 (26 Jul 2018 22:15), Max: 100.2 (26 Jul 2018 22:15)  HR: 100 (26 Jul 2018 21:30) (60 - 100)  BP: 124/93 (26 Jul 2018 21:30) (99/51 - 149/78)  RR: 24 (26 Jul 2018 21:30) (18 - 24)  SpO2: 97% (26 Jul 2018 21:30) (97% - 100%)    > Physical Assessment:  General: Awake, No acute distress.   Neurology: A&Ox3, nonfocal  CV: +S1S2, RRR.  No peripheral edema  Respiratory: Even, unlabored.  CTA B/L.    Abdomen:  +BS.  Soft, NT, ND.  No palpable mass  MSK: ALLAN x4.   Skin: Warm and Dry         > Assessment & Plan:  - HPI:     -Plan:               JONAH Workman-BC  Medicine Department MEDICINE NP    MYRIAM HEATH  68y Female    Patient is a 68y old  Female who presents with a chief complaint of weakness (26 Jul 2018 16:54)       > Event Summary:  Notified by RN, Patient with "shaking" throughout and seen at bedside.    Patient appears frail, AAOX2-3 w/ confusion, unable to contribute.  Does report pain to hips w/ turning/ positioning.    and daughter at bedside.  Patient noted w/ diffuse shaking, skin is cool to touch.  Inguinal Temp 100.2    -Vital Signs Last 24 Hrs  T(C): 37.9 (26 Jul 2018 22:15), Max: 37.9 (26 Jul 2018 22:15)  T(F): 100.2 (26 Jul 2018 22:15), Max: 100.2 (26 Jul 2018 22:15)  HR: 100 (26 Jul 2018 21:30) (60 - 100)  BP: 124/93 (26 Jul 2018 21:30) (99/51 - 149/78)  RR: 24 (26 Jul 2018 21:30) (18 - 24)  SpO2: 97% (26 Jul 2018 21:30) (97% - 100%)    > Physical Assessment:  General: Frail elderly. AAO2-3 w/ confusion. +Diffuse shaking.   CV: +S1S2, RRR.  No peripheral edema  Respiratory: Even, unlabored.  CTA B/L.    Abdomen:  +BS.  Soft, NT, ND.  No palpable mass  MSK: ALLAN x4. No gross deformities.   Skin: Right hip w/ clean/dry/intact incision site.         > Assessment & Plan:  - HPI: 68F pmhx of early onset Alzheimer disease, recent Hip fracture from osteoporosis s/p repair last month, Anxiety/ Depression, Asthma, HLD and GERD.  Admitted with severe Sepsis 2/2 Rt. hip hematoma/ ?fluid collection, HypoNatremia on ivf.  Now with shaking.    1) Shaking: Concern for fever/infection vs  Anxiety / Alzheimer's Disease process  -Patient with low grade temp, will give Tylenol 650mg po x1 now  -C/w ABX, IVF regimen   -Will give Patient's  due for home HS medications of Klonopin, Zoloft and re-evaluate  -Will hold of iv narcotic for now  -D/w RN, and    -ICU Consult if decompensates          JONAH Workman-BC  Medicine Department  #[id number] MEDICINE NP    MYRIAM HEATH  68y Female    Patient is a 68y old  Female who presents with a chief complaint of weakness (26 Jul 2018 16:54)       > Event Summary:  Notified by RN, Patient with "shaking" throughout and seen at bedside.    Patient appears frail, AAOX2-3 w/ confusion, unable to contribute.  Does report pain to hips w/ turning/ positioning.    and daughter at bedside.  Patient noted w/ diffuse shaking, skin is cool to touch.  Inguinal Temp 100.2    -Vital Signs Last 24 Hrs  T(C): 37.9 (26 Jul 2018 22:15), Max: 37.9 (26 Jul 2018 22:15)  T(F): 100.2 (26 Jul 2018 22:15), Max: 100.2 (26 Jul 2018 22:15)  HR: 100 (26 Jul 2018 21:30) (60 - 100)  BP: 124/93 (26 Jul 2018 21:30) (99/51 - 149/78)  RR: 24 (26 Jul 2018 21:30) (18 - 24)  SpO2: 97% (26 Jul 2018 21:30) (97% - 100%)    > Physical Assessment:  General: Frail elderly. AAO2-3 w/ confusion. +Diffuse shaking.   CV: +S1S2, RRR.  No peripheral edema  Respiratory: Even, unlabored.  CTA B/L.    Abdomen:  +BS.  Soft, NT, ND.  No palpable mass  MSK: ALLAN x4. No gross deformities.   Skin: Right hip w/ clean/dry/intact incision site.         > Assessment & Plan:  - HPI: 68F pmhx of early onset Alzheimer disease, recent Hip fracture from osteoporosis s/p repair last month, Anxiety/ Depression, Asthma, HLD and GERD.  Admitted with severe Sepsis 2/2 Rt. hip hematoma/ ?fluid collection, HypoNatremia on ivf.  Now with shaking.    1) Shaking: Concern for fever/infection vs  Anxiety / Alzheimer's Disease process  -Patient with low grade temp, will give Tylenol 650mg po x1 now  -C/w ABX, IVF regimen   -Will give Patient's  due for home HS medications of Klonopin, Zoloft and re-evaluate  -Will hold of iv narcotic for now  -D/w RN, and    -ICU Consult if decompensates          JONAH Workman-BC  Medicine Department  #[id number]            >ADDEND:  7/27/18 @ 12:15AM  -Notified by RN, Patient with SBP 60's and seen at bedside.  Patient found lethargic, difficult to arouse requiring sternal rub, RR 24-26 shallow.  BP repeat 66-80's/40 's.   -Patient status post received Oxy IR @ 9PM.  -RRT called for AMS and concern for Opioid side effect.   -RRT responded to Patient's bedside and assumed care of patient.  See RRT flowsheet.   -Patient requiring Levophed, concern for septic shock, admitted to SICU service.  Transferred to unit by team.   -Family  and daughter at bedside  -Attending notified      JONAH Workman-BC  Medicine Department  #[id number] MEDICINE NP    MYRIAM HEATH  68y Female    Patient is a 68y old  Female who presents with a chief complaint of weakness (26 Jul 2018 16:54)       > Event Summary:  Notified by RN, Patient with "shaking" throughout and seen at bedside.    Patient appears frail, AAOX2-3 w/ confusion, unable to contribute.  Does c/o  "pain" to leg / hips w/ turning/ positioning.    and daughter at bedside.  Patient noted w/ diffuse shaking, skin is cool to touch.  Inguinal Temp 100.2    -Vital Signs Last 24 Hrs  T(C): 37.9 (26 Jul 2018 22:15), Max: 37.9 (26 Jul 2018 22:15)  T(F): 100.2 (26 Jul 2018 22:15), Max: 100.2 (26 Jul 2018 22:15)  HR: 100 (26 Jul 2018 21:30) (60 - 100)  BP: 124/93 (26 Jul 2018 21:30) (99/51 - 149/78)  RR: 24 (26 Jul 2018 21:30) (18 - 24)  SpO2: 97% (26 Jul 2018 21:30) (97% - 100%)    > Review Of System:   General:	+low grade fever.  +Shaking chills.    Neurological:  Unable to contribute.   Skin:  No rash. No open lesions.       > Physical Assessment:  General: Frail elderly. AAO2-3 w/ confusion. +Diffuse shaking.   CV: +S1S2, RRR.  No peripheral edema  Respiratory: Even, unlabored.  CTA B/L.    Abdomen:  +BS.  Soft, NT, ND.  No palpable mass  MSK: ALLAN x4. No gross deformities.   Skin: Right hip w/ clean/dry/intact incision site.         > Assessment & Plan:  - HPI: 68F pmhx of early onset Alzheimer disease, recent Hip fracture from osteoporosis s/p repair last month, Anxiety/ Depression, Asthma, HLD and GERD.  Admitted with severe Sepsis 2/2 Rt. hip hematoma/ ?fluid collection, HypoNatremia on ivf.  Now with Shaking.    1) Shaking: Concern for chills / Infection vs  Anxiety / Alzheimer's Disease process  -Patient with low grade temp, will give Tylenol 650mg po x1 now  -C/w ABX, IVF regimen   -f/u Bld cx / UCx from today   -Will give Patient's due for home HS medications of Klonopin, Zoloft and re-evaluate.   -Will hold off iv narcotic for now  -D/w RN, and    -ICU Consult if decompensates          JONAH Workman-BC  Medicine Department  #[id number]            >ADDEND:  7/27/18 @ 12:15AM  -Notified by RN, Patient with SBP 60's and seen at bedside.  Patient found lethargic, difficult to arouse requiring sternal rub, RR 24-26 shallow.  BP repeat 66-80's/40 's.   -Patient status post received Oxy IR @ 9PM.  -RRT called for AMS and concern for Opioid side effect.   -RRT responded to Patient's bedside and assumed care of patient.  See RRT flowsheet.   -Patient requiring Levophed, concern for septic shock, admitted to SICU service.  Transferred to unit by team.   -Family  and daughter at bedside  -Attending notified      JONAH Workman-BC  Medicine Department  #[id number]

## 2018-07-26 NOTE — ED ADULT NURSE REASSESSMENT NOTE - NS ED NURSE REASSESS COMMENT FT1
Patient straight cathed under sterile technique with second RN present.  Patient drained 580mL dark yellow urine and tolerated procedure well.  Family at bedside, red socks on and fall band on.  Bed in lowest position.

## 2018-07-26 NOTE — CONSULT NOTE ADULT - ASSESSMENT
69 yo female with dementia, history of UC, and recent RT Hip hemiarthroplasty.  Certainly with low grade fever and leukocytosis I am concerned about infection, especially rt hip with CT documented fluid collection.  Incision is not tender and not angry in appearance although a deeper collection may be infected.  Her PCN allergy is distant and vague as per .  Suggest:  1.Blood cultures x 2 sets  2.Vanco and cefepime  3.Favor IR aspiration of fluid collection, will defer to ortho  4.ID issues reviewed with  and daughter

## 2018-07-26 NOTE — ED PROVIDER NOTE - MEDICAL DECISION MAKING DETAILS
Jonathan Weil, PGY2 - generalized weakness and possible slurred speech w/ anorexia, found to be hypoglycemic on bedside FSG. Given D50, but FSG remained low, however discordant with vbg/cmp glucose drawn at the same time. Patient mentating well throughout this period. Will obtain CT head, ecg, and labs w/ broad search for metabolic, infectious, and vascular etiologies. Continue monitoring blood sugar. Expect admission.

## 2018-07-26 NOTE — ED PROVIDER NOTE - PROGRESS NOTE DETAILS
pt with elevated troponin to 112. also with lactate 2.5 wbc 21. ekg with depressed st segments in V4/5 which is slightly changed from previous. appears most c/w sepsis, which likely explains the elevated trop. pt without cp or sob at this time. unlikely acs. to repeat ekg at this time. - Everardo Arriaga MD received call from lab, bands 20%. UA neg. due to concern for joint infection, and possible need to aspirate, holding administration of iv abx until d/w ortho and see if need for urgent arthrocentesis. Jonathan Weil, PGY2 - orthopedics evaluated the patient. Low suspicion for septic arthritis, would not pursue emergent tap. No etiology yet identified for elevated white count w/ markedly elevated bands - no new focal findings on re-exam. Trop positive by delta, but pt w/ no chest pain or dyspnea. Will obtain non-con CT a/p, put in for non-emergent MRI L-spine given difficulty walking w/ ?acute on chronic? L spine pain. Jonathan Weil, PGY2 - Dr. Bolden (PCP) called for information on the patient's course. Agrees with imaging plan. Can admit the patient to his service. pt was seen by orthopedics who does not think there is a joint infection. ok to start abx. given recent surgery will obtain ct a/p non-con to eval for joint effusion. unclear etiology of pt's infection given bandemia and elevated wbc. on re-assessment neck supple without meningmus, low suspicion of meningitis, no LP at this time. is concern for potential infectious etiology lumbar spine given pain, LE sx, to obtain MRI to eval for infectious etiology, ordered. pt admitted to medicine service, dr carrillo updated on course of stay and plan. abx being given. he will follow. - Everardo Arriaga MD Jonathan Weil, PGY2 - Spoke w/ radiology twice about CT findings. There is a fluid collection adjacent to the R hip, uncertain if it connects to the joint space, plus a L acetabular fracture and a sternal fracture. Ortho update, they will review the images and decide on need for tap/drainage. Primary admitting attending updated as well. Surgery contacted for trauma consult.

## 2018-07-26 NOTE — H&P ADULT - ASSESSMENT
68F pmhx of early onset Alzheimer disease, recent hip fracture from osteoporosis s/p repair last month, anxiety/depression, asthma, HLD and GERD who presents with severe sepsis and acute back pain and LE weakness.

## 2018-07-26 NOTE — H&P ADULT - PROBLEM SELECTOR PLAN 4
Pt has Alzheimers at baseline but is more confused than normal likely 2/2 underlying sepsis vs. hypoglycemia.  CT head showed no acute new infarction.   Stat repeat FS is wnl and now placed on Q4hr FS.  Pt takes no antihyperglycemics at home so unclear etiology of hypoglycemia except she has not eaten since yesterday.  If persistently low would start Dextrose 5% @ 30mls/hr. Pt has Alzheimers at baseline but is more confused than normal likely 2/2 underlying sepsis vs. hypoglycemia.  CT head showed no acute new infarction.   Stat repeat FS is wnl and now placed on Q4hr FS.  Pt takes no antihyperglycemics at home so unclear etiology of hypoglycemia except she has not eaten since yesterday.  If persistently low would start Dextrose 5% @ 30mls/hr.    #Hyponatremia  Pt has decreased PO intake and looks euvolumic on exam after IVF recussitation.  Would check Serum OSM and hold off on more fluid until 10pm BMP check.  Goal for tomorrow will be 132 by 12pm.  If 10pm draw shows NA < 125 would fluid restrict and if > 132 would give D5 @150mls/hr.  Check BMP in am.

## 2018-07-26 NOTE — H&P ADULT - PROBLEM SELECTOR PLAN 5
Daughters give history of frequent UTIs that are not present on UA but rather on culture?  She is having significant symptoms with frequency and dysuria which point to this as potential source however UA is negative.  She is being covered regardless with above Abx regiment and will follow up urine culture.

## 2018-07-26 NOTE — H&P ADULT - FAMILY HISTORY
Grandparent  Still living? Unknown  Family history of colon cancer, Age at diagnosis: Age Unknown  Family history of dementia, Age at diagnosis: Age Unknown

## 2018-07-26 NOTE — CONSULT NOTE ADULT - SUBJECTIVE AND OBJECTIVE BOX
68F presents to [facility] ED with one day of weakness, sweats and confusion. Pt recently had R hip hemiarthroplasty on 6/22 by Dr. Rey. Pt was doing well, ambulating with a walker at home. Per Pt and Pt's , Pt did not have any recent falls. She developed weakness on Tuesday and began to have night sweats/decreased urine output yesterday. Pt had a followup appt with Dr. Rey this past Monday and had no complaints at that time. Pt with known hematoma over R hip that was being monitored by Dr. Rey and has been improving since the surgery. Pt currently c/o pain in L hip. No other complaints. Denies numbness/tingling, denies incontinence, admits to subjective chills.     CONSTITUTIONAL: + fever or chills  HEENT:  No headache, no sore throat  RESPIRATORY: No cough, wheezing, or shortness of breath  CARDIOVASCULAR: No chest pain, palpitations, or leg swelling  GASTROINTESTINAL: No nausea, vomiting, or diarrhea  GENITOURINARY: No dysuria, frequency, or hematuria  NEUROLOGICAL: no focal weakness or dizziness  SKIN:  No rashes or lesions   MUSCULOSKELETAL: no myalgias   PSYCHIATRIC: No depression or anxiety    PAST MEDICAL & SURGICAL HISTORY:  CVA (cerebral vascular accident)  Fatty pancreas  PNA (pneumonia)  Pulmonary HTN  IGT (impaired glucose tolerance)  Ulcerative colitis  Acid reflux  Anxiety  Depression  Mouth sores  HLD (hyperlipidemia)  Asthma  Humeral head fracture  H/O: hysterectomy  H/O cataract extraction, left  History of knee replacement    Home Medications:  Advair Diskus 500 mcg-50 mcg inhalation powder: 1 puff(s) inhaled once a day (in the morning) (26 Jul 2018 18:52)  Aleve 220 mg oral tablet: 1 tab(s) orally 2 times a day (26 Jul 2018 18:52)  Chelated Magnesium 100 mg oral tablet: 1 tab(s) orally once a day (in the morning) (26 Jul 2018 18:52)  clonazePAM 1 mg oral tablet: 2 tab(s) orally once a day (in the evening) (26 Jul 2018 18:52)  clopidogrel 75 mg oral tablet: 1 tab(s) orally once a day (in the morning) (26 Jul 2018 18:52)  docusate sodium 100 mg oral capsule: 1 cap(s) orally once a day, As Needed (26 Jul 2018 18:52)  luxlyte awake drops 4.75mg thc and 0.25mg cbd/1mL: 1 milliliter(s)  3 times a day (26 Jul 2018 18:52)  Melatonin 3 mg oral tablet: 1 tab(s) orally once a day (in the evening) (26 Jul 2018 18:52)  mirtazapine 15 mg oral tablet: 3 tab(s) orally once a day (at bedtime) (26 Jul 2018 18:52)  oxyCODONE 10 mg oral tablet: 1 tab(s) orally every 4 to 6 hours, As Needed (26 Jul 2018 18:52)  Pepcid OTC: 20 milligram(s) orally 2 times a day (26 Jul 2018 18:52)  predniSONE 10 mg oral tablet: 1 tab(s) orally once a day (in the morning) (26 Jul 2018 18:52)  QUEtiapine 50 mg oral tablet: 1 tab(s) orally once a day (at bedtime) (26 Jul 2018 18:52)  Senna 8.6 mg oral tablet: 1 tab(s) orally once a day, As Needed (26 Jul 2018 18:52)  sertraline 50 mg oral tablet: 1 tab(s) orally once a day (26 Jul 2018 18:52)  Spiriva 18 mcg inhalation capsule: 1 cap(s) inhaled once a day (in the morning) (26 Jul 2018 18:52)  triamterene-hydrochlorothiazide 37.5 mg-25 mg oral tablet: 1 tab(s) orally once a day (26 Jul 2018 18:52)  Tylenol Extra Strength 500 mg oral tablet: 2 tab(s) orally 2 times a day (26 Jul 2018 18:52)  Zyflo 600 mg oral tablet: 2 tab(s) orally 2 times a day (26 Jul 2018 18:52)    Allergies    ASA; dye contrast (Anaphylaxis)  aspirin (Short breath)  divalproex sodium (Other (Unknown))  Haldol (Other (Unknown))  penicillin (Short breath; Rash)  sulfa drugs (Short breath; Rash)  Xanax (Other (Unknown))    Intolerances    Vital Signs Last 24 Hrs  T(C): 36.5 (26 Jul 2018 21:30), Max: 37.6 (26 Jul 2018 11:54)  T(F): 97.7 (26 Jul 2018 21:30), Max: 99.6 (26 Jul 2018 11:54)  HR: 100 (26 Jul 2018 21:30) (60 - 100)  BP: 124/93 (26 Jul 2018 21:30) (99/51 - 149/78)  BP(mean): --  RR: 24 (26 Jul 2018 21:30) (18 - 24)  SpO2: 97% (26 Jul 2018 21:30) (97% - 100%)    Imaging: XR/CT Pelvis: L Acetabulum fx    Physical  Gen: NAD, awake/alert  RLE: +healing incision over hip, minimal erythema, no warmth/drainage, +EHL/FHL/TA/GS, SILT, +dp pulse intact, compartments soft/compressible, unable to SLR 2/2 weakness, no pain with PROM of hip, negative log roll    LLE: Skin intact, no ttp hip/elsewhere, unable to SLR 2/2 weakness/pain, minimal pain with log roll, +EHL/FHL/TA/GS, SILT, +dp pulse intact, compartments soft/compressible, full painless ROM at knee/ankle

## 2018-07-26 NOTE — ED PROVIDER NOTE - OBJECTIVE STATEMENT
68F p/w weakness for 1 day.  describes the patient is able to ambulate w/ a walker at baseline, but yesterday became acutely weak, appeared to have decreased strength throughout, and was unable to walk w/o substantial assistance. Additionally, she appeared to have slurred speech, described more as mumbling, and has not had a BM in ~1 week. No fever/chest pain/difficulty breathing/abd pain/dysuria/cough. She has also had a decreased appetite for 2-3 days. 68F p/w weakness for 1 day.  describes the patient is able to ambulate w/ a walker at baseline, but yesterday became acutely weak, appeared to have decreased strength throughout, and was unable to walk w/o substantial assistance. Additionally, she appeared to have slurred speech, described more as mumbling, and has not had a BM in ~1 week. No fever/chest pain/difficulty breathing/abd pain/dysuria/cough. She has also had a decreased appetite for 2-3 days. She has chronic lumbar pain unchanged from baseline..

## 2018-07-26 NOTE — CONSULT NOTE ADULT - ASSESSMENT
68F with L acetabulum fx  Pt with known hx of hematoma over R hip s/p hemiarthroplasty, has been improving, would not recommend aspiration at this time  NWB LLE, bedrest   DVT ppx  FU BCx  Medical optimization/clearance for OR for CRPP L Acetabulum fx  Plan for OR on Saturday pending med optimization  Discussed with attending

## 2018-07-26 NOTE — H&P ADULT - PROBLEM SELECTOR PLAN 10
Pt takes Prednisone 10mg PO daily, chronically so is immunosuppressed also likely has some component of adrenal insufficiency and this should be considered in acute situations if she becomes septic or has surgery  Continue Advair daily with rinsing with water to prevent thrush      FEN:  Regular diet  Keep Mg > 2 k >4 Pt takes Prednisone 10mg PO daily, chronically so is immunosuppressed also likely has some component of adrenal insufficiency and this should be considered in acute situations if she becomes septic or has surgery  Continue Advair daily with rinsing with water to prevent thrush      FEN:  Regular diet  Keep Mg > 2 k >4    Discussed with Dr. Kimi mcclain, pt, family and NP ED

## 2018-07-26 NOTE — CHART NOTE - NSCHARTNOTEFT_GEN_A_CORE
Notified by RN, patient with sodium level 120. Patien      Vital Signs Last 24 Hrs  T(C): 36.7 (26 Jul 2018 20:24), Max: 37.6 (26 Jul 2018 11:54)  T(F): 98.1 (26 Jul 2018 20:24), Max: 99.6 (26 Jul 2018 11:54)  HR: 94 (26 Jul 2018 20:24) (60 - 94)  BP: 114/67 (26 Jul 2018 20:24) (99/51 - 149/78)  BP(mean): --  RR: 18 (26 Jul 2018 20:24) (18 - 18)  SpO2: 98% (26 Jul 2018 20:24) (98% - 100%)      Labs:                          11.0   21.3  )-----------( 270      ( 26 Jul 2018 12:39 )             32.3     07-26    120<LL>  |  88<L>  |  28<H>  ----------------------------<  165<H>  4.9   |  16<L>  |  1.04    Ca    7.8<L>      26 Jul 2018 18:58  Mg     2.1     07-26    TPro  6.2  /  Alb  3.5  /  TBili  0.7  /  DBili  x   /  AST  30  /  ALT  15  /  AlkPhos  216<H>  07-26        Radiology:    Physical Exam:  General: WN/WD NAD  Neurology: A&Ox3, nonfocal, ALLAN x 4  Head:  Normocephalic, atraumatic  Respiratory: CTA B/L  CV: RRR, S1S2, no murmur  Abdominal: Soft, NT, ND no palpable mass  MSK: No edema, + peripheral pulses, FROM all 4 extremity    Assessment & Plan:  HPI:  68F pmhx of asthma, anxiety/depression, HLD, UC, pHTN presents with weakness.  She was recently hospitalized here for a right hip fracture s/p repair last month.  She is chrissie in by her family who say that on Monday she had the staples removed from her hip surgery.  She was 2 weeks ago developed a hematoma on the right hip after using Plavix with Lovenox for PPX after orhto surgery so this was stopped.  She was doing fine this week until last night when she said that she had to go to the bathroom "30 times" per the  and could not urinate or have BM.  She kept trying to get out of bed and walk to the bathroom and she usually walks with a walker with some assistance but last night she actuely became weak and not able to walk with walker and 2 person assist.  She also was brought to the ED 2 weeks ago for new onset severe back pain with radiation to her left hip.  Her  says this presentation was similar to when she broke her right hip last month (has osteoperosis) and was concerned again but after ED visit said she did not break her hip.  She has been taking her Oxycodone since the surgery Q4hrs w/o pain relief.  Last night she developed rigors, did not take temperature, urinary frequency, increased confusion and agitation along with back pain and LE weakness so she was brought days.     In ED:  T 99.6 rectally HR 60-92 /58 RR 18  She was seen by orthopedics for concern of joint infection in the right hip, given Meropenum and Vancomycin, 2LNs and also 2 amps of D50 for symptomatic (AMS) hypoglycemia. (26 Jul 2018 16:54)    >  >  >  >        Follow up with Attending in AM. Notified by RN, patient with sodium level 120. Patient seen and examined at bedside. Patient alert and oriented to self and place, confused. Per spouse, patient "sundowns" all the time and can get agitated. Patient reports right hip pain, + chills, and urinary frequency. Denied headache, lightheadedness, SOB, CP, palpitations, n/v/abdominal pain.      Review of Systems:  · General Symptoms: no fever, no sweating, + chills  · Respiratory: no SOB or cough  · Cardiovascular: no chest pain or palpitations  · Gastrointestinal: no diarrhea; no abdominal pain; no jaundice;   · General Genitourinary Symptoms: oliguria  · Musculoskeletal Symptoms	joint swelling; weakness, back pain, hip pain  · Neurological Symptoms:	weakness; difficulty walking; confusion    PAST MEDICAL & SURGICAL HISTORY:  CVA (cerebral vascular accident)  Fatty pancreas  PNA (pneumonia)  Pulmonary HTN  IGT (impaired glucose tolerance)  Ulcerative colitis  Acid reflux  Anxiety  Depression  Mouth sores  HLD (hyperlipidemia)  Asthma  Humeral head fracture  H/O: hysterectomy  H/O cataract extraction, left  History of knee replacement      Allergies and Intolerances:        Allergies:  	ASA; dye contrast: Miscellaneous, Anaphylaxis, ASA, dye contrast  	penicillin: Drug, Short breath, Rash  	aspirin: Drug, Short breath  	sulfa drugs: Drug Category, Short breath, Rash  	Xanax: Drug, Other (Unknown), pt becomes agitated & restless  	Haldol: Drug, Other (Unknown), pt becomes agitated and restless  	divalproex sodium: Drug, Other (Unknown), pt becomes restless and agitated    Radiology:    CT C/A/P:  A 17.0 cm collection along the right lateral thigh musculature adjacent   to the right hip prosthesis. Superimposed infection is not excluded.   New compression fracture of the L2 vertebral body as above. Correlate   with pending lumbar spine MRI.  Mildly depressed acute fracture of the sternal manubrium.  Acute comminuted fracture of the left acetabulum.    CT Head: When compared with the prior no gross change in appearance   of the left parietal cortical focus of old infarct.  CT in the right corona radiata region when compared with prior CT   6/10/2017 consistent with white matter ischemia, age indeterminate given   the fact that it was not identified on the prior 6/10/2017.    Physical exam:  General: alert, awake, no acute distress, frail appearing  Eyes:  anicteric, no conjunctival injection  Lungs: clear to auscultation  Heart: regular rate and rhythm;   Abdomen: soft, nondistended, nontender  Extremities: no clubbing, cyanosis, or edema  Neurologic: alert and oriented to person and place, confused  MSK: RT hip incision is slightly indurated, limited ROM    Vital Signs Last 24 Hrs  T(C): 36.7 (26 Jul 2018 20:24), Max: 37.6 (26 Jul 2018 11:54)  T(F): 98.1 (26 Jul 2018 20:24), Max: 99.6 (26 Jul 2018 11:54)  HR: 94 (26 Jul 2018 20:24) (60 - 94)  BP: 114/67 (26 Jul 2018 20:24) (99/51 - 149/78)  BP(mean): --  RR: 18 (26 Jul 2018 20:24) (18 - 18)  SpO2: 98% (26 Jul 2018 20:24) (98% - 100%)      Labs:                          11.0   21.3  )-----------( 270      ( 26 Jul 2018 12:39 )             32.3     07-26    120<LL>  |  88<L>  |  28<H>  ----------------------------<  165<H>  4.9   |  16<L>  |  1.04    Ca    7.8<L>      26 Jul 2018 18:58  Mg     2.1     07-26    TPro  6.2  /  Alb  3.5  /  TBili  0.7  /  DBili  x   /  AST  30  /  ALT  15  /  AlkPhos  216<H>  07-26        Assessment & Plan:  68F pmhx of early onset Alzheimer disease, recent hip fracture from osteoporosis s/p repair last month, anxiety/depression, asthma, HLD and GERD.  Admitted with severe sepsis, metabolic encephalopathy, acute back pain, metabolic acidosis, urinary frequency and hypoglycemia. Patient now with hyponatremia.    1. Hyponatremia  - send urine Osm and urine Na+  - check TSH and AM cortisol level  - Called [facility], Dr. Church and recommended initiating sodium chloride 2% at 40ml hr. Goal sodium level 126-128 in 24hrs  - check BMP q4hrs  - Blackman, strict I&0 q4hrs  - neuro checks q4hrs  - pain control  - monitor VS q 4hrs  - if patient decompensates, call MICU consult  - Discussed with Dr. Bolden and in agreement with the plan      Mackenzie Rodriguez NP-[facility]  #[id number] Notified by RN, patient with sodium level 120. Patient seen and examined at bedside. Patient alert and oriented to self and place, confused. Per spouse, patient "sundowns" all the time and can get agitated. Patient reports left hip pain, + chills, and urinary frequency. Denied headache, lightheadedness, SOB, CP, palpitations, n/v/abdominal pain.      Review of Systems:  · General Symptoms: no fever, no sweating, + chills  · Respiratory: no SOB or cough  · Cardiovascular: no chest pain or palpitations  · Gastrointestinal: no diarrhea; no abdominal pain; no jaundice;   · General Genitourinary Symptoms: oliguria  · Musculoskeletal Symptoms	joint swelling; weakness, back pain, hip pain  · Neurological Symptoms:	weakness; difficulty walking; confusion    PAST MEDICAL & SURGICAL HISTORY:  CVA (cerebral vascular accident)  Fatty pancreas  PNA (pneumonia)  Pulmonary HTN  IGT (impaired glucose tolerance)  Ulcerative colitis  Acid reflux  Anxiety  Depression  Mouth sores  HLD (hyperlipidemia)  Asthma  Humeral head fracture  H/O: hysterectomy  H/O cataract extraction, left  History of knee replacement      Allergies and Intolerances:        Allergies:  	ASA; dye contrast: Miscellaneous, Anaphylaxis, ASA, dye contrast  	penicillin: Drug, Short breath, Rash  	aspirin: Drug, Short breath  	sulfa drugs: Drug Category, Short breath, Rash  	Xanax: Drug, Other (Unknown), pt becomes agitated & restless  	Haldol: Drug, Other (Unknown), pt becomes agitated and restless  	divalproex sodium: Drug, Other (Unknown), pt becomes restless and agitated    Radiology:    CT C/A/P:  A 17.0 cm collection along the right lateral thigh musculature adjacent   to the right hip prosthesis. Superimposed infection is not excluded.   New compression fracture of the L2 vertebral body as above. Correlate   with pending lumbar spine MRI.  Mildly depressed acute fracture of the sternal manubrium.  Acute comminuted fracture of the left acetabulum.    CT Head: When compared with the prior no gross change in appearance   of the left parietal cortical focus of old infarct.  CT in the right corona radiata region when compared with prior CT   6/10/2017 consistent with white matter ischemia, age indeterminate given   the fact that it was not identified on the prior 6/10/2017.    Physical exam:  General: alert, awake, no acute distress, frail appearing  Eyes:  anicteric, no conjunctival injection  Lungs: clear to auscultation  Heart: regular rate and rhythm;   Abdomen: soft, nondistended, nontender  Extremities: no clubbing, cyanosis, or edema  Neurologic: alert and oriented to person and place, confused  skin: RT hip incision healing, slight erythema,     Vital Signs Last 24 Hrs  T(C): 36.7 (26 Jul 2018 20:24), Max: 37.6 (26 Jul 2018 11:54)  T(F): 98.1 (26 Jul 2018 20:24), Max: 99.6 (26 Jul 2018 11:54)  HR: 94 (26 Jul 2018 20:24) (60 - 94)  BP: 114/67 (26 Jul 2018 20:24) (99/51 - 149/78)  BP(mean): --  RR: 18 (26 Jul 2018 20:24) (18 - 18)  SpO2: 98% (26 Jul 2018 20:24) (98% - 100%)      Labs:                          11.0   21.3  )-----------( 270      ( 26 Jul 2018 12:39 )             32.3     07-26    120<LL>  |  88<L>  |  28<H>  ----------------------------<  165<H>  4.9   |  16<L>  |  1.04    Ca    7.8<L>      26 Jul 2018 18:58  Mg     2.1     07-26    TPro  6.2  /  Alb  3.5  /  TBili  0.7  /  DBili  x   /  AST  30  /  ALT  15  /  AlkPhos  216<H>  07-26        Assessment & Plan:  68F pmhx of early onset Alzheimer disease, recent hip fracture from osteoporosis s/p repair last month, anxiety/depression, asthma, HLD and GERD.  Admitted with severe sepsis, metabolic encephalopathy, acute back pain, metabolic acidosis, urinary frequency and hypoglycemia. Patient now with hyponatremia.    1. Hyponatremia  - send urine Osm and urine Na+  - check TSH and AM cortisol level  - Called [facility], Dr. Church and recommended initiating sodium chloride 2% at 40ml hr. Goal sodium level 126-128 in 24hrs  - check BMP q4hrs  - Blackman, strict I&0 q4hrs  - neuro checks q4hrs  - pain control  - monitor VS q 4hrs  - if patient decompensates, call MICU consult  - Discussed with Dr. Bolden and in agreement with the plan      Mackenzie Rodriguez NP-[facility]  #[id number]

## 2018-07-26 NOTE — CONSULT NOTE ADULT - SUBJECTIVE AND OBJECTIVE BOX
HPI:   Patient is a 68y female with a past history of CVA, dementia,Ulcerative colitis.who had a recent RT hip hemiarthroplasty after a fracture, sent home 1 month ago,who came to ER today with chills last night, profound weakness, and minimal urine output.In ER she had a low grade fever, a WBC of 21,000,and a CT of A/P whivh in addition to fracture of left acetabulum shows a RT sided fluid collection along upper thigh.She is unable to give a history, I have obtained info from her .    REVIEW OF SYSTEMS:  All other review of systems negative (Comprehensive ROS)    PAST MEDICAL & SURGICAL HISTORY:  CVA (cerebral vascular accident)  Fatty pancreas  PNA (pneumonia)  Pulmonary HTN  IGT (impaired glucose tolerance)  Ulcerative colitis  Acid reflux  Anxiety  Depression  Mouth sores  HLD (hyperlipidemia)  Asthma  Humeral head fracture  H/O: hysterectomy  H/O cataract extraction, left  History of knee replacement      Allergies    ASA; dye contrast (Anaphylaxis)  aspirin (Short breath)  divalproex sodium (Other (Unknown))  Haldol (Other (Unknown))  penicillin (Short breath; Rash)  sulfa drugs (Short breath; Rash)  Xanax (Other (Unknown))    Intolerances        Antimicrobials Day #  :  meropenem  IVPB 500 milliGRAM(s) IV Intermittent once  vancomycin  IVPB 1000 milliGRAM(s) IV Intermittent once    Other Medications:  acetaminophen   Tablet 650 milliGRAM(s) Oral every 6 hours PRN  buDESOnide 160 MICROgram(s)/formoterol 4.5 MICROgram(s) Inhaler 2 Puff(s) Inhalation two times a day  clonazePAM Tablet 2 milliGRAM(s) Oral at bedtime PRN  clopidogrel Tablet 75 milliGRAM(s) Oral daily  docusate sodium 100 milliGRAM(s) Oral daily  enoxaparin Injectable 40 milliGRAM(s) SubCutaneous every 24 hours  melatonin 3 milliGRAM(s) Oral at bedtime  mirtazapine 45 milliGRAM(s) Oral daily  morphine  - Injectable 2 milliGRAM(s) IV Push every 4 hours PRN  oxyCODONE    IR 10 milliGRAM(s) Oral every 4 hours PRN  predniSONE   Tablet 10 milliGRAM(s) Oral daily  QUEtiapine 50 milliGRAM(s) Oral at bedtime  senna 2 Tablet(s) Oral at bedtime  sertraline 50 milliGRAM(s) Oral daily      FAMILY HISTORY:  Family history of dementia (Grandparent)  Family history of colon cancer (Grandparent)      SOCIAL HISTORY:  Smoking: no    ETOH: no    Drug Use:no          T(F): 99.6 (18 @ 11:54), Max: 99.6 (18 @ 11:54)  HR: 60 (18 @ 11:54)  BP: 110/58 (18 @ 11:54)  RR: 18 (18 @ 11:54)  SpO2: --  Wt(kg): --    PHYSICAL EXAM:  General: alert, no acute distress, frail appearing  Eyes:  anicteric, no conjunctival injection, no discharge  Oropharynx: no lesions or injection 	  Neck: supple, without adenopathy  Lungs: clear to auscultation  Heart: regular rate and rhythm; no murmur, rubs or gallops  Abdomen: soft, nondistended, nontender, without mass or organomegaly  Skin: no lesions  Extremities: no clubbing, cyanosis, or edema  Neurologic: alert, marginally intractive, moves all extremities  RT hip incision is slightly indurated, no drainage  LAB RESULTS:                        11.0   21.3  )-----------( 270      ( 2018 12:39 )             32.3         125<L>  |  87<L>  |  30<H>  ----------------------------<  82  3.4<L>   |  20<L>  |  1.18    Ca    8.5      2018 12:39  Mg     1.4         TPro  6.2  /  Alb  3.5  /  TBili  0.7  /  DBili  x   /  AST  30  /  ALT  15  /  AlkPhos  216<H>      LIVER FUNCTIONS - ( 2018 12:39 )  Alb: 3.5 g/dL / Pro: 6.2 g/dL / ALK PHOS: 216 U/L / ALT: 15 U/L / AST: 30 U/L / GGT: x           Urinalysis Basic - ( 2018 13:32 )    Color: Yellow / Appearance: Clear / S.023 / pH: x  Gluc: x / Ketone: Negative  / Bili: Negative / Urobili: Negative   Blood: x / Protein: Trace / Nitrite: Negative   Leuk Esterase: Negative / RBC: 0-2 /HPF / WBC 3-5 /HPF   Sq Epi: x / Non Sq Epi: x / Bacteria: x        MICROBIOLOGY:  RECENT CULTURES:        RADIOLOGY REVIEWED:  Chest CT  negative  < from: CT Abdomen and Pelvis No Cont (18 @ 17:19) >  IMPRESSION:     A 17.0 cm collection along the right lateral thigh musculature adjacent   to the right hip prosthesis. Superimposed infection is not excluded.     New compression fracture of the L2 vertebral body as above. Correlate   with pending lumbar spine MRI.    Mildly depressed acute fracture of the sternal manubrium.    Acute comminuted fracture of the left acetabulum.    These findings were discussed with Dr. Weil at 2018 5:52 PM by Dr. Marcus Ramos with read back confirmation.    < end of copied text >

## 2018-07-26 NOTE — H&P ADULT - MENTAL STATUS
AAOx1 at slightly more confused than baseline per daughters.  Keeps asking to go back to her room where we already are.

## 2018-07-26 NOTE — H&P ADULT - NEUROLOGICAL COMMENTS
Tongue midline, left pupil larger than right with irregular pupil, Shoulder shrug 4/5 b/l and equal.

## 2018-07-26 NOTE — H&P ADULT - PROBLEM SELECTOR PLAN 1
Pt has new onset of LLE weakness and inability to walk in setting of fevers and significant bandemia after instrumentation for right hip replacment last month raising concern for spinal abscess.  Urgent MRI lumbar spine W/O  contrast as pt has anaphylaxis to contrast.  F/u ortho recs who were consulted and saw patient in ED.   Rectal tone in tact on exam but significant LE weakness and per  this is new onset. She has also significant back pain that is new in her lumbar spine. Pt has new onset of LLE weakness and inability to walk in setting of fevers and significant bandemia after instrumentation for right hip replacement last month raising concern for spinal abscess.  Urgent MRI lumbar spine W/O  contrast as pt has anaphylaxis to contrast.  F/u ortho recs who were consulted and saw patient in ED.   Rectal tone in tact on exam but significant LE weakness and per  this is new onset. She has also significant back pain that is new in her lumbar spine. Pt has new onset of LLE weakness and inability to walk and CT shows acute left pelvic fracture with L2 compression fracture.   Discussed with ortho, pt should have fixation sooner rather than later however is not stable for OR tonight.  She is actively septic with bandemia and no clear source.  She also has significant troponemia and hypoglycemia which would put her at high risk for adverse events at this current time.  Spoke with PMD who agrees. He will tere cardiology for further assessment and reasses her stability tomorrow.  Ortho following and aware.   Rectal tone in tact on exam but significant LE weakness and per  this is new onset. She has also significant back pain that is new in her lumbar spine likely from the L2 compression fracture.  Cannot rule out abscess so will get MRI Lumbar spine

## 2018-07-26 NOTE — ED ADULT NURSE NOTE - OBJECTIVE STATEMENT
68 year old female alert and oriented x 2 came to the ED by EMS c/o increased confusion, slurred speech and difficulty walking since last night.  Patient confused at baseline.  Patient had right hip surgery 4 weeks ago and staples were removed a few days ago.  Patient has been ambulating with a walker and steady gait per .  Patient's  said since last night she has had difficulty urinating and she has not had a BM in one week despite taking stool softeners.  Patient has had trouble walking since last night.   noted slurred speech.  Patient has weakness on the right lower extremity and surgical site is CDI and free of drainage.   said patient had temp of 99 at home last night, but no fevers.  Patient has some lower back pain and  endorses mechanical fall last night and no LOC or head trauma.  Patient has lower back pain which precededed the fall.  Patient controlling oral secretions and has clear speech.  Patient turned, cleaned and repositioned.  Yellow fall band on, red socks on and bed in lowest position with  at bedside.  #20 established in right AC.

## 2018-07-27 ENCOUNTER — APPOINTMENT (OUTPATIENT)
Dept: ORTHOPEDIC SURGERY | Facility: CLINIC | Age: 68
End: 2018-07-27

## 2018-07-27 DIAGNOSIS — E87.1 HYPO-OSMOLALITY AND HYPONATREMIA: ICD-10-CM

## 2018-07-27 LAB
ALBUMIN SERPL ELPH-MCNC: 2.3 G/DL — LOW (ref 3.3–5)
ALBUMIN SERPL ELPH-MCNC: 2.4 G/DL — LOW (ref 3.3–5)
ALP SERPL-CCNC: 162 U/L — HIGH (ref 40–120)
ALP SERPL-CCNC: 163 U/L — HIGH (ref 40–120)
ALT FLD-CCNC: 10 U/L — SIGNIFICANT CHANGE UP (ref 10–45)
ALT FLD-CCNC: 12 U/L — SIGNIFICANT CHANGE UP (ref 10–45)
ANION GAP SERPL CALC-SCNC: 10 MMOL/L — SIGNIFICANT CHANGE UP (ref 5–17)
ANION GAP SERPL CALC-SCNC: 12 MMOL/L — SIGNIFICANT CHANGE UP (ref 5–17)
ANION GAP SERPL CALC-SCNC: 12 MMOL/L — SIGNIFICANT CHANGE UP (ref 5–17)
ANION GAP SERPL CALC-SCNC: 13 MMOL/L — SIGNIFICANT CHANGE UP (ref 5–17)
ANION GAP SERPL CALC-SCNC: 13 MMOL/L — SIGNIFICANT CHANGE UP (ref 5–17)
APPEARANCE UR: CLEAR — SIGNIFICANT CHANGE UP
APTT BLD: 35.8 SEC — SIGNIFICANT CHANGE UP (ref 27.5–37.4)
APTT BLD: 38.5 SEC — HIGH (ref 27.5–37.4)
AST SERPL-CCNC: 14 U/L — SIGNIFICANT CHANGE UP (ref 10–40)
AST SERPL-CCNC: 17 U/L — SIGNIFICANT CHANGE UP (ref 10–40)
BASE EXCESS BLDV CALC-SCNC: -6.7 MMOL/L — LOW (ref -2–2)
BILIRUB DIRECT SERPL-MCNC: 0.2 MG/DL — SIGNIFICANT CHANGE UP (ref 0–0.2)
BILIRUB INDIRECT FLD-MCNC: 0.2 MG/DL — SIGNIFICANT CHANGE UP (ref 0.2–1)
BILIRUB SERPL-MCNC: 0.4 MG/DL — SIGNIFICANT CHANGE UP (ref 0.2–1.2)
BILIRUB SERPL-MCNC: 0.4 MG/DL — SIGNIFICANT CHANGE UP (ref 0.2–1.2)
BILIRUB UR-MCNC: NEGATIVE — SIGNIFICANT CHANGE UP
BUN SERPL-MCNC: 25 MG/DL — HIGH (ref 7–23)
BUN SERPL-MCNC: 27 MG/DL — HIGH (ref 7–23)
BUN SERPL-MCNC: 28 MG/DL — HIGH (ref 7–23)
CALCIUM SERPL-MCNC: 7.3 MG/DL — LOW (ref 8.4–10.5)
CALCIUM SERPL-MCNC: 7.7 MG/DL — LOW (ref 8.4–10.5)
CALCIUM SERPL-MCNC: 7.7 MG/DL — LOW (ref 8.4–10.5)
CALCIUM SERPL-MCNC: 7.9 MG/DL — LOW (ref 8.4–10.5)
CALCIUM SERPL-MCNC: 8.1 MG/DL — LOW (ref 8.4–10.5)
CHLORIDE SERPL-SCNC: 100 MMOL/L — SIGNIFICANT CHANGE UP (ref 96–108)
CHLORIDE SERPL-SCNC: 100 MMOL/L — SIGNIFICANT CHANGE UP (ref 96–108)
CHLORIDE SERPL-SCNC: 101 MMOL/L — SIGNIFICANT CHANGE UP (ref 96–108)
CK MB CFR SERPL CALC: 3.7 NG/ML — SIGNIFICANT CHANGE UP (ref 0–3.8)
CK MB CFR SERPL CALC: 3.8 NG/ML — SIGNIFICANT CHANGE UP (ref 0–3.8)
CK SERPL-CCNC: 65 U/L — SIGNIFICANT CHANGE UP (ref 25–170)
CK SERPL-CCNC: 89 U/L — SIGNIFICANT CHANGE UP (ref 25–170)
CO2 BLDV-SCNC: 19 MMOL/L — LOW (ref 22–30)
CO2 SERPL-SCNC: 13 MMOL/L — LOW (ref 22–31)
CO2 SERPL-SCNC: 14 MMOL/L — LOW (ref 22–31)
CO2 SERPL-SCNC: 14 MMOL/L — LOW (ref 22–31)
CO2 SERPL-SCNC: 16 MMOL/L — LOW (ref 22–31)
CO2 SERPL-SCNC: 17 MMOL/L — LOW (ref 22–31)
COLOR SPEC: YELLOW — SIGNIFICANT CHANGE UP
CREAT ?TM UR-MCNC: 81 MG/DL — SIGNIFICANT CHANGE UP
CREAT SERPL-MCNC: 0.82 MG/DL — SIGNIFICANT CHANGE UP (ref 0.5–1.3)
CREAT SERPL-MCNC: 0.91 MG/DL — SIGNIFICANT CHANGE UP (ref 0.5–1.3)
CREAT SERPL-MCNC: 0.91 MG/DL — SIGNIFICANT CHANGE UP (ref 0.5–1.3)
CREAT SERPL-MCNC: 0.99 MG/DL — SIGNIFICANT CHANGE UP (ref 0.5–1.3)
CREAT SERPL-MCNC: 1.07 MG/DL — SIGNIFICANT CHANGE UP (ref 0.5–1.3)
CRP SERPL-MCNC: 32.54 MG/DL — HIGH (ref 0–0.4)
CULTURE RESULTS: NO GROWTH — SIGNIFICANT CHANGE UP
DIFF PNL FLD: NEGATIVE — SIGNIFICANT CHANGE UP
GAS PNL BLDA: SIGNIFICANT CHANGE UP
GAS PNL BLDV: SIGNIFICANT CHANGE UP
GLUCOSE BLDC GLUCOMTR-MCNC: 110 MG/DL — HIGH (ref 70–99)
GLUCOSE SERPL-MCNC: 139 MG/DL — HIGH (ref 70–99)
GLUCOSE SERPL-MCNC: 174 MG/DL — HIGH (ref 70–99)
GLUCOSE SERPL-MCNC: 194 MG/DL — HIGH (ref 70–99)
GLUCOSE SERPL-MCNC: 303 MG/DL — HIGH (ref 70–99)
GLUCOSE SERPL-MCNC: 99 MG/DL — SIGNIFICANT CHANGE UP (ref 70–99)
GLUCOSE UR QL: NEGATIVE — SIGNIFICANT CHANGE UP
GRAM STN FLD: SIGNIFICANT CHANGE UP
HBA1C BLD-MCNC: 5.5 % — SIGNIFICANT CHANGE UP (ref 4–5.6)
HCO3 BLDV-SCNC: 18 MMOL/L — LOW (ref 21–29)
HCT VFR BLD CALC: 23.8 % — LOW (ref 34.5–45)
HCT VFR BLD CALC: 26.1 % — LOW (ref 34.5–45)
HCT VFR BLD CALC: 27 % — LOW (ref 34.5–45)
HCT VFR BLD CALC: 27.7 % — LOW (ref 34.5–45)
HCT VFR BLD CALC: 29.4 % — LOW (ref 34.5–45)
HGB BLD-MCNC: 10 G/DL — LOW (ref 11.5–15.5)
HGB BLD-MCNC: 7.9 G/DL — LOW (ref 11.5–15.5)
HGB BLD-MCNC: 8.8 G/DL — LOW (ref 11.5–15.5)
HGB BLD-MCNC: 9 G/DL — LOW (ref 11.5–15.5)
HGB BLD-MCNC: 9.2 G/DL — LOW (ref 11.5–15.5)
HOROWITZ INDEX BLDV+IHG-RTO: 32 — SIGNIFICANT CHANGE UP
INR BLD: 1.46 RATIO — HIGH (ref 0.88–1.16)
INR BLD: 1.5 RATIO — HIGH (ref 0.88–1.16)
KETONES UR-MCNC: NEGATIVE — SIGNIFICANT CHANGE UP
LEUKOCYTE ESTERASE UR-ACNC: ABNORMAL
MAGNESIUM SERPL-MCNC: 1.8 MG/DL — SIGNIFICANT CHANGE UP (ref 1.6–2.6)
MAGNESIUM SERPL-MCNC: 1.8 MG/DL — SIGNIFICANT CHANGE UP (ref 1.6–2.6)
MAGNESIUM SERPL-MCNC: 2.4 MG/DL — SIGNIFICANT CHANGE UP (ref 1.6–2.6)
MAGNESIUM SERPL-MCNC: 2.4 MG/DL — SIGNIFICANT CHANGE UP (ref 1.6–2.6)
MCHC RBC-ENTMCNC: 28.7 PG — SIGNIFICANT CHANGE UP (ref 27–34)
MCHC RBC-ENTMCNC: 28.7 PG — SIGNIFICANT CHANGE UP (ref 27–34)
MCHC RBC-ENTMCNC: 29.1 PG — SIGNIFICANT CHANGE UP (ref 27–34)
MCHC RBC-ENTMCNC: 29.3 PG — SIGNIFICANT CHANGE UP (ref 27–34)
MCHC RBC-ENTMCNC: 29.5 PG — SIGNIFICANT CHANGE UP (ref 27–34)
MCHC RBC-ENTMCNC: 33.2 GM/DL — SIGNIFICANT CHANGE UP (ref 32–36)
MCHC RBC-ENTMCNC: 33.3 GM/DL — SIGNIFICANT CHANGE UP (ref 32–36)
MCHC RBC-ENTMCNC: 33.3 GM/DL — SIGNIFICANT CHANGE UP (ref 32–36)
MCHC RBC-ENTMCNC: 33.7 GM/DL — SIGNIFICANT CHANGE UP (ref 32–36)
MCHC RBC-ENTMCNC: 34.2 GM/DL — SIGNIFICANT CHANGE UP (ref 32–36)
MCV RBC AUTO: 86.1 FL — SIGNIFICANT CHANGE UP (ref 80–100)
MCV RBC AUTO: 86.2 FL — SIGNIFICANT CHANGE UP (ref 80–100)
MCV RBC AUTO: 86.3 FL — SIGNIFICANT CHANGE UP (ref 80–100)
MCV RBC AUTO: 86.5 FL — SIGNIFICANT CHANGE UP (ref 80–100)
MCV RBC AUTO: 88.2 FL — SIGNIFICANT CHANGE UP (ref 80–100)
METHOD TYPE: SIGNIFICANT CHANGE UP
MRSA SPEC QL CULT: SIGNIFICANT CHANGE UP
NITRITE UR-MCNC: NEGATIVE — SIGNIFICANT CHANGE UP
OSMOLALITY UR: 298 MOS/KG — LOW (ref 300–900)
OSMOLALITY UR: 299 MOS/KG — LOW (ref 300–900)
PCO2 BLDV: 36 MMHG — SIGNIFICANT CHANGE UP (ref 35–50)
PH BLDV: 7.32 — LOW (ref 7.35–7.45)
PH UR: 6 — SIGNIFICANT CHANGE UP (ref 5–8)
PHOSPHATE SERPL-MCNC: 2.5 MG/DL — SIGNIFICANT CHANGE UP (ref 2.5–4.5)
PHOSPHATE SERPL-MCNC: 2.9 MG/DL — SIGNIFICANT CHANGE UP (ref 2.5–4.5)
PHOSPHATE SERPL-MCNC: 2.9 MG/DL — SIGNIFICANT CHANGE UP (ref 2.5–4.5)
PHOSPHATE SERPL-MCNC: 3.6 MG/DL — SIGNIFICANT CHANGE UP (ref 2.5–4.5)
PLATELET # BLD AUTO: 114 K/UL — LOW (ref 150–400)
PLATELET # BLD AUTO: 164 K/UL — SIGNIFICANT CHANGE UP (ref 150–400)
PLATELET # BLD AUTO: 180 K/UL — SIGNIFICANT CHANGE UP (ref 150–400)
PLATELET # BLD AUTO: 181 K/UL — SIGNIFICANT CHANGE UP (ref 150–400)
PLATELET # BLD AUTO: 195 K/UL — SIGNIFICANT CHANGE UP (ref 150–400)
PO2 BLDV: 39 MMHG — SIGNIFICANT CHANGE UP (ref 25–45)
POTASSIUM SERPL-MCNC: 3.4 MMOL/L — LOW (ref 3.5–5.3)
POTASSIUM SERPL-MCNC: 4.6 MMOL/L — SIGNIFICANT CHANGE UP (ref 3.5–5.3)
POTASSIUM SERPL-MCNC: 5.4 MMOL/L — HIGH (ref 3.5–5.3)
POTASSIUM SERPL-SCNC: 3.4 MMOL/L — LOW (ref 3.5–5.3)
POTASSIUM SERPL-SCNC: 4.6 MMOL/L — SIGNIFICANT CHANGE UP (ref 3.5–5.3)
POTASSIUM SERPL-SCNC: 5.4 MMOL/L — HIGH (ref 3.5–5.3)
PROT SERPL-MCNC: 4.3 G/DL — LOW (ref 6–8.3)
PROT SERPL-MCNC: 4.6 G/DL — LOW (ref 6–8.3)
PROT UR-MCNC: SIGNIFICANT CHANGE UP
PROTHROM AB SERPL-ACNC: 15.9 SEC — HIGH (ref 9.8–12.7)
PROTHROM AB SERPL-ACNC: 16.5 SEC — HIGH (ref 9.8–12.7)
RBC # BLD: 2.7 M/UL — LOW (ref 3.8–5.2)
RBC # BLD: 3.02 M/UL — LOW (ref 3.8–5.2)
RBC # BLD: 3.13 M/UL — LOW (ref 3.8–5.2)
RBC # BLD: 3.22 M/UL — LOW (ref 3.8–5.2)
RBC # BLD: 3.4 M/UL — LOW (ref 3.8–5.2)
RBC # FLD: 18.4 % — HIGH (ref 10.3–14.5)
RBC # FLD: 18.6 % — HIGH (ref 10.3–14.5)
SAO2 % BLDV: 66 % — LOW (ref 67–88)
SODIUM SERPL-SCNC: 126 MMOL/L — LOW (ref 135–145)
SODIUM SERPL-SCNC: 127 MMOL/L — LOW (ref 135–145)
SODIUM SERPL-SCNC: 127 MMOL/L — LOW (ref 135–145)
SODIUM SERPL-SCNC: 128 MMOL/L — LOW (ref 135–145)
SODIUM SERPL-SCNC: 129 MMOL/L — LOW (ref 135–145)
SODIUM UR-SCNC: 27 MMOL/L — SIGNIFICANT CHANGE UP
SP GR SPEC: 1.02 — SIGNIFICANT CHANGE UP (ref 1.01–1.02)
SPECIMEN SOURCE: SIGNIFICANT CHANGE UP
TROPONIN T, HIGH SENSITIVITY RESULT: 74 NG/L — HIGH (ref 0–51)
UROBILINOGEN FLD QL: NEGATIVE — SIGNIFICANT CHANGE UP
WBC # BLD: 10 K/UL — SIGNIFICANT CHANGE UP (ref 3.8–10.5)
WBC # BLD: 10.4 K/UL — SIGNIFICANT CHANGE UP (ref 3.8–10.5)
WBC # BLD: 11.8 K/UL — HIGH (ref 3.8–10.5)
WBC # BLD: 12.1 K/UL — HIGH (ref 3.8–10.5)
WBC # BLD: 12.6 K/UL — HIGH (ref 3.8–10.5)
WBC # FLD AUTO: 10 K/UL — SIGNIFICANT CHANGE UP (ref 3.8–10.5)
WBC # FLD AUTO: 10.4 K/UL — SIGNIFICANT CHANGE UP (ref 3.8–10.5)
WBC # FLD AUTO: 11.8 K/UL — HIGH (ref 3.8–10.5)
WBC # FLD AUTO: 12.1 K/UL — HIGH (ref 3.8–10.5)
WBC # FLD AUTO: 12.6 K/UL — HIGH (ref 3.8–10.5)

## 2018-07-27 PROCEDURE — 72170 X-RAY EXAM OF PELVIS: CPT | Mod: 26

## 2018-07-27 PROCEDURE — 71045 X-RAY EXAM CHEST 1 VIEW: CPT | Mod: 26

## 2018-07-27 PROCEDURE — 99233 SBSQ HOSP IP/OBS HIGH 50: CPT | Mod: 25

## 2018-07-27 PROCEDURE — 36556 INSERT NON-TUNNEL CV CATH: CPT

## 2018-07-27 PROCEDURE — 99291 CRITICAL CARE FIRST HOUR: CPT | Mod: 25

## 2018-07-27 PROCEDURE — 71045 X-RAY EXAM CHEST 1 VIEW: CPT | Mod: 26,77

## 2018-07-27 PROCEDURE — 27236 TREAT THIGH FRACTURE: CPT | Mod: 52,78,RT

## 2018-07-27 PROCEDURE — 26990 DRAINAGE OF PELVIS LESION: CPT | Mod: 78,RT

## 2018-07-27 PROCEDURE — 93010 ELECTROCARDIOGRAM REPORT: CPT

## 2018-07-27 PROCEDURE — 73552 X-RAY EXAM OF FEMUR 2/>: CPT | Mod: 26,RT

## 2018-07-27 PROCEDURE — 99222 1ST HOSP IP/OBS MODERATE 55: CPT | Mod: GC

## 2018-07-27 PROCEDURE — 36620 INSERTION CATHETER ARTERY: CPT

## 2018-07-27 RX ORDER — MIRTAZAPINE 45 MG/1
45 TABLET, ORALLY DISINTEGRATING ORAL AT BEDTIME
Qty: 0 | Refills: 0 | Status: DISCONTINUED | OUTPATIENT
Start: 2018-07-27 | End: 2018-07-27

## 2018-07-27 RX ORDER — HALOPERIDOL DECANOATE 100 MG/ML
2.5 INJECTION INTRAMUSCULAR ONCE
Qty: 0 | Refills: 0 | Status: DISCONTINUED | OUTPATIENT
Start: 2018-07-27 | End: 2018-07-27

## 2018-07-27 RX ORDER — BUDESONIDE, MICRONIZED 100 %
0.5 POWDER (GRAM) MISCELLANEOUS EVERY 12 HOURS
Qty: 0 | Refills: 0 | Status: DISCONTINUED | OUTPATIENT
Start: 2018-07-27 | End: 2018-08-03

## 2018-07-27 RX ORDER — CHLORHEXIDINE GLUCONATE 213 G/1000ML
1 SOLUTION TOPICAL
Qty: 0 | Refills: 0 | Status: DISCONTINUED | OUTPATIENT
Start: 2018-07-27 | End: 2018-08-03

## 2018-07-27 RX ORDER — CHLORHEXIDINE GLUCONATE 213 G/1000ML
15 SOLUTION TOPICAL
Qty: 0 | Refills: 0 | Status: DISCONTINUED | OUTPATIENT
Start: 2018-07-27 | End: 2018-07-27

## 2018-07-27 RX ORDER — FENTANYL CITRATE 50 UG/ML
50 INJECTION INTRAVENOUS ONCE
Qty: 0 | Refills: 0 | Status: DISCONTINUED | OUTPATIENT
Start: 2018-07-27 | End: 2018-07-27

## 2018-07-27 RX ORDER — HYDROCORTISONE 20 MG
50 TABLET ORAL EVERY 6 HOURS
Qty: 0 | Refills: 0 | Status: DISCONTINUED | OUTPATIENT
Start: 2018-07-27 | End: 2018-07-30

## 2018-07-27 RX ORDER — HEPARIN SODIUM 5000 [USP'U]/ML
5000 INJECTION INTRAVENOUS; SUBCUTANEOUS EVERY 8 HOURS
Qty: 0 | Refills: 0 | Status: DISCONTINUED | OUTPATIENT
Start: 2018-07-27 | End: 2018-07-27

## 2018-07-27 RX ORDER — MAGNESIUM SULFATE 500 MG/ML
2 VIAL (ML) INJECTION ONCE
Qty: 0 | Refills: 0 | Status: COMPLETED | OUTPATIENT
Start: 2018-07-27 | End: 2018-07-27

## 2018-07-27 RX ORDER — NOREPINEPHRINE BITARTRATE/D5W 8 MG/250ML
0.05 PLASTIC BAG, INJECTION (ML) INTRAVENOUS
Qty: 8 | Refills: 0 | Status: DISCONTINUED | OUTPATIENT
Start: 2018-07-27 | End: 2018-07-27

## 2018-07-27 RX ORDER — POTASSIUM PHOSPHATE, MONOBASIC POTASSIUM PHOSPHATE, DIBASIC 236; 224 MG/ML; MG/ML
30 INJECTION, SOLUTION INTRAVENOUS ONCE
Qty: 0 | Refills: 0 | Status: COMPLETED | OUTPATIENT
Start: 2018-07-27 | End: 2018-07-27

## 2018-07-27 RX ORDER — TIOTROPIUM BROMIDE 18 UG/1
1 CAPSULE ORAL; RESPIRATORY (INHALATION) DAILY
Qty: 0 | Refills: 0 | Status: DISCONTINUED | OUTPATIENT
Start: 2018-07-27 | End: 2018-07-27

## 2018-07-27 RX ORDER — ENOXAPARIN SODIUM 100 MG/ML
40 INJECTION SUBCUTANEOUS EVERY 24 HOURS
Qty: 0 | Refills: 0 | Status: DISCONTINUED | OUTPATIENT
Start: 2018-07-27 | End: 2018-07-27

## 2018-07-27 RX ORDER — VASOPRESSIN 20 [USP'U]/ML
0.03 INJECTION INTRAVENOUS
Qty: 100 | Refills: 0 | Status: DISCONTINUED | OUTPATIENT
Start: 2018-07-27 | End: 2018-07-27

## 2018-07-27 RX ORDER — CEFEPIME 1 G/1
1000 INJECTION, POWDER, FOR SOLUTION INTRAMUSCULAR; INTRAVENOUS EVERY 24 HOURS
Qty: 0 | Refills: 0 | Status: DISCONTINUED | OUTPATIENT
Start: 2018-07-28 | End: 2018-07-28

## 2018-07-27 RX ORDER — SENNA PLUS 8.6 MG/1
2 TABLET ORAL AT BEDTIME
Qty: 0 | Refills: 0 | Status: DISCONTINUED | OUTPATIENT
Start: 2018-07-27 | End: 2018-07-27

## 2018-07-27 RX ORDER — DEXTROSE 50 % IN WATER 50 %
50 SYRINGE (ML) INTRAVENOUS ONCE
Qty: 0 | Refills: 0 | Status: COMPLETED | OUTPATIENT
Start: 2018-07-27 | End: 2018-07-27

## 2018-07-27 RX ORDER — IPRATROPIUM/ALBUTEROL SULFATE 18-103MCG
3 AEROSOL WITH ADAPTER (GRAM) INHALATION EVERY 6 HOURS
Qty: 0 | Refills: 0 | Status: DISCONTINUED | OUTPATIENT
Start: 2018-07-27 | End: 2018-07-27

## 2018-07-27 RX ORDER — ENOXAPARIN SODIUM 100 MG/ML
40 INJECTION SUBCUTANEOUS EVERY 24 HOURS
Qty: 0 | Refills: 0 | Status: DISCONTINUED | OUTPATIENT
Start: 2018-07-28 | End: 2018-07-29

## 2018-07-27 RX ORDER — NOREPINEPHRINE BITARTRATE/D5W 8 MG/250ML
0.05 PLASTIC BAG, INJECTION (ML) INTRAVENOUS
Qty: 8 | Refills: 0 | Status: DISCONTINUED | OUTPATIENT
Start: 2018-07-27 | End: 2018-07-28

## 2018-07-27 RX ORDER — VANCOMYCIN HCL 1 G
VIAL (EA) INTRAVENOUS
Qty: 0 | Refills: 0 | Status: DISCONTINUED | OUTPATIENT
Start: 2018-07-27 | End: 2018-07-27

## 2018-07-27 RX ORDER — PANTOPRAZOLE SODIUM 20 MG/1
40 TABLET, DELAYED RELEASE ORAL DAILY
Qty: 0 | Refills: 0 | Status: DISCONTINUED | OUTPATIENT
Start: 2018-07-27 | End: 2018-07-30

## 2018-07-27 RX ORDER — CEFEPIME 1 G/1
1000 INJECTION, POWDER, FOR SOLUTION INTRAMUSCULAR; INTRAVENOUS EVERY 12 HOURS
Qty: 0 | Refills: 0 | Status: DISCONTINUED | OUTPATIENT
Start: 2018-07-27 | End: 2018-07-27

## 2018-07-27 RX ORDER — DOCUSATE SODIUM 100 MG
100 CAPSULE ORAL THREE TIMES A DAY
Qty: 0 | Refills: 0 | Status: DISCONTINUED | OUTPATIENT
Start: 2018-07-27 | End: 2018-07-27

## 2018-07-27 RX ORDER — CHLORHEXIDINE GLUCONATE 213 G/1000ML
15 SOLUTION TOPICAL
Qty: 0 | Refills: 0 | Status: DISCONTINUED | OUTPATIENT
Start: 2018-07-27 | End: 2018-07-30

## 2018-07-27 RX ORDER — VANCOMYCIN HCL 1 G
750 VIAL (EA) INTRAVENOUS EVERY 12 HOURS
Qty: 0 | Refills: 0 | Status: DISCONTINUED | OUTPATIENT
Start: 2018-07-27 | End: 2018-07-27

## 2018-07-27 RX ORDER — SODIUM CHLORIDE 9 MG/ML
1000 INJECTION, SOLUTION INTRAVENOUS
Qty: 0 | Refills: 0 | Status: DISCONTINUED | OUTPATIENT
Start: 2018-07-27 | End: 2018-07-28

## 2018-07-27 RX ORDER — POTASSIUM CHLORIDE 20 MEQ
20 PACKET (EA) ORAL
Qty: 0 | Refills: 0 | Status: COMPLETED | OUTPATIENT
Start: 2018-07-27 | End: 2018-07-27

## 2018-07-27 RX ORDER — INSULIN LISPRO 100/ML
VIAL (ML) SUBCUTANEOUS EVERY 4 HOURS
Qty: 0 | Refills: 0 | Status: DISCONTINUED | OUTPATIENT
Start: 2018-07-27 | End: 2018-07-27

## 2018-07-27 RX ORDER — VASOPRESSIN 20 [USP'U]/ML
0.03 INJECTION INTRAVENOUS
Qty: 100 | Refills: 0 | Status: DISCONTINUED | OUTPATIENT
Start: 2018-07-27 | End: 2018-07-28

## 2018-07-27 RX ORDER — ACETAMINOPHEN 500 MG
650 TABLET ORAL EVERY 6 HOURS
Qty: 0 | Refills: 0 | Status: DISCONTINUED | OUTPATIENT
Start: 2018-07-27 | End: 2018-07-27

## 2018-07-27 RX ORDER — HYDROMORPHONE HYDROCHLORIDE 2 MG/ML
0.5 INJECTION INTRAMUSCULAR; INTRAVENOUS; SUBCUTANEOUS
Qty: 0 | Refills: 0 | Status: DISCONTINUED | OUTPATIENT
Start: 2018-07-27 | End: 2018-07-29

## 2018-07-27 RX ORDER — OXYCODONE HYDROCHLORIDE 5 MG/1
5 TABLET ORAL EVERY 4 HOURS
Qty: 0 | Refills: 0 | Status: DISCONTINUED | OUTPATIENT
Start: 2018-07-27 | End: 2018-07-27

## 2018-07-27 RX ORDER — VANCOMYCIN HCL 1 G
750 VIAL (EA) INTRAVENOUS EVERY 12 HOURS
Qty: 0 | Refills: 0 | Status: DISCONTINUED | OUTPATIENT
Start: 2018-07-27 | End: 2018-07-29

## 2018-07-27 RX ORDER — HYDROCORTISONE 20 MG
50 TABLET ORAL EVERY 6 HOURS
Qty: 0 | Refills: 0 | Status: DISCONTINUED | OUTPATIENT
Start: 2018-07-27 | End: 2018-07-27

## 2018-07-27 RX ORDER — CALCIUM GLUCONATE 100 MG/ML
2 VIAL (ML) INTRAVENOUS ONCE
Qty: 0 | Refills: 0 | Status: COMPLETED | OUTPATIENT
Start: 2018-07-27 | End: 2018-07-27

## 2018-07-27 RX ORDER — CHLORHEXIDINE GLUCONATE 213 G/1000ML
1 SOLUTION TOPICAL
Qty: 0 | Refills: 0 | Status: DISCONTINUED | OUTPATIENT
Start: 2018-07-27 | End: 2018-07-27

## 2018-07-27 RX ORDER — SODIUM CHLORIDE 9 MG/ML
1000 INJECTION, SOLUTION INTRAVENOUS
Qty: 0 | Refills: 0 | Status: DISCONTINUED | OUTPATIENT
Start: 2018-07-27 | End: 2018-07-27

## 2018-07-27 RX ORDER — BUDESONIDE, MICRONIZED 100 %
0.5 POWDER (GRAM) MISCELLANEOUS EVERY 12 HOURS
Qty: 0 | Refills: 0 | Status: DISCONTINUED | OUTPATIENT
Start: 2018-07-27 | End: 2018-07-27

## 2018-07-27 RX ORDER — FENTANYL CITRATE 50 UG/ML
0.5 INJECTION INTRAVENOUS
Qty: 5000 | Refills: 0 | Status: DISCONTINUED | OUTPATIENT
Start: 2018-07-27 | End: 2018-07-28

## 2018-07-27 RX ORDER — SODIUM CHLORIDE 9 MG/ML
1000 INJECTION INTRAMUSCULAR; INTRAVENOUS; SUBCUTANEOUS ONCE
Qty: 0 | Refills: 0 | Status: DISCONTINUED | OUTPATIENT
Start: 2018-07-27 | End: 2018-07-27

## 2018-07-27 RX ORDER — QUETIAPINE FUMARATE 200 MG/1
50 TABLET, FILM COATED ORAL AT BEDTIME
Qty: 0 | Refills: 0 | Status: DISCONTINUED | OUTPATIENT
Start: 2018-07-27 | End: 2018-07-27

## 2018-07-27 RX ORDER — DEXMEDETOMIDINE HYDROCHLORIDE IN 0.9% SODIUM CHLORIDE 4 UG/ML
0.3 INJECTION INTRAVENOUS
Qty: 200 | Refills: 0 | Status: DISCONTINUED | OUTPATIENT
Start: 2018-07-27 | End: 2018-07-30

## 2018-07-27 RX ORDER — NOREPINEPHRINE BITARTRATE/D5W 8 MG/250ML
0.03 PLASTIC BAG, INJECTION (ML) INTRAVENOUS
Qty: 16 | Refills: 0 | Status: DISCONTINUED | OUTPATIENT
Start: 2018-07-27 | End: 2018-07-27

## 2018-07-27 RX ORDER — HYDROCORTISONE 20 MG
100 TABLET ORAL ONCE
Qty: 0 | Refills: 0 | Status: DISCONTINUED | OUTPATIENT
Start: 2018-07-27 | End: 2018-07-27

## 2018-07-27 RX ORDER — NALOXONE HYDROCHLORIDE 4 MG/.1ML
0.4 SPRAY NASAL ONCE
Qty: 0 | Refills: 0 | Status: DISCONTINUED | OUTPATIENT
Start: 2018-07-27 | End: 2018-07-27

## 2018-07-27 RX ORDER — ACETAMINOPHEN 500 MG
750 TABLET ORAL ONCE
Qty: 0 | Refills: 0 | Status: COMPLETED | OUTPATIENT
Start: 2018-07-27 | End: 2018-07-27

## 2018-07-27 RX ORDER — MAGNESIUM SULFATE 500 MG/ML
2 VIAL (ML) INJECTION ONCE
Qty: 0 | Refills: 0 | Status: DISCONTINUED | OUTPATIENT
Start: 2018-07-27 | End: 2018-07-27

## 2018-07-27 RX ORDER — SODIUM CHLORIDE 9 MG/ML
1000 INJECTION, SOLUTION INTRAVENOUS ONCE
Qty: 0 | Refills: 0 | Status: COMPLETED | OUTPATIENT
Start: 2018-07-27 | End: 2018-07-27

## 2018-07-27 RX ADMIN — SODIUM CHLORIDE 100 MILLILITER(S): 9 INJECTION, SOLUTION INTRAVENOUS at 08:08

## 2018-07-27 RX ADMIN — Medication 50 MILLIEQUIVALENT(S): at 08:26

## 2018-07-27 RX ADMIN — VASOPRESSIN 1.8 UNIT(S)/MIN: 20 INJECTION INTRAVENOUS at 05:47

## 2018-07-27 RX ADMIN — Medication 250 MILLIGRAM(S): at 05:45

## 2018-07-27 RX ADMIN — Medication 50 GRAM(S): at 10:39

## 2018-07-27 RX ADMIN — SODIUM CHLORIDE 40 MILLILITER(S): 5 INJECTION, SOLUTION INTRAVENOUS at 05:44

## 2018-07-27 RX ADMIN — Medication 10 MILLIGRAM(S): at 05:43

## 2018-07-27 RX ADMIN — Medication 50 MILLIGRAM(S): at 19:20

## 2018-07-27 RX ADMIN — FENTANYL CITRATE 50 MICROGRAM(S): 50 INJECTION INTRAVENOUS at 22:30

## 2018-07-27 RX ADMIN — Medication 50 MILLIEQUIVALENT(S): at 11:39

## 2018-07-27 RX ADMIN — Medication 4.72 MICROGRAM(S)/KG/MIN: at 12:56

## 2018-07-27 RX ADMIN — Medication 50 MILLIEQUIVALENT(S): at 09:30

## 2018-07-27 RX ADMIN — CEFEPIME 100 MILLIGRAM(S): 1 INJECTION, POWDER, FOR SOLUTION INTRAMUSCULAR; INTRAVENOUS at 05:43

## 2018-07-27 RX ADMIN — Medication 250 MILLIGRAM(S): at 21:47

## 2018-07-27 RX ADMIN — CHLORHEXIDINE GLUCONATE 1 APPLICATION(S): 213 SOLUTION TOPICAL at 06:03

## 2018-07-27 RX ADMIN — Medication 650 MILLIGRAM(S): at 09:45

## 2018-07-27 RX ADMIN — Medication 300 MILLIGRAM(S): at 05:45

## 2018-07-27 RX ADMIN — Medication 50 MILLIGRAM(S): at 11:43

## 2018-07-27 RX ADMIN — OXYCODONE HYDROCHLORIDE 5 MILLIGRAM(S): 5 TABLET ORAL at 06:44

## 2018-07-27 RX ADMIN — CHLORHEXIDINE GLUCONATE 15 MILLILITER(S): 213 SOLUTION TOPICAL at 22:22

## 2018-07-27 RX ADMIN — Medication 50 MILLILITER(S): at 06:05

## 2018-07-27 RX ADMIN — Medication 3 MILLILITER(S): at 12:09

## 2018-07-27 RX ADMIN — FENTANYL CITRATE 50 MICROGRAM(S): 50 INJECTION INTRAVENOUS at 22:45

## 2018-07-27 RX ADMIN — Medication 650 MILLIGRAM(S): at 09:01

## 2018-07-27 RX ADMIN — BUDESONIDE AND FORMOTEROL FUMARATE DIHYDRATE 2 PUFF(S): 160; 4.5 AEROSOL RESPIRATORY (INHALATION) at 05:56

## 2018-07-27 RX ADMIN — Medication 200 GRAM(S): at 05:47

## 2018-07-27 RX ADMIN — SODIUM CHLORIDE 4000 MILLILITER(S): 9 INJECTION, SOLUTION INTRAVENOUS at 13:00

## 2018-07-27 RX ADMIN — Medication 100 MILLIGRAM(S): at 11:44

## 2018-07-27 RX ADMIN — OXYCODONE HYDROCHLORIDE 5 MILLIGRAM(S): 5 TABLET ORAL at 07:14

## 2018-07-27 NOTE — CHART NOTE - NSCHARTNOTEFT_GEN_A_CORE
MAR RRT NOTE    RRT called for AMS. Briefly, patient is 68 female with early onset Alzheimer disease, osteoporosis c/b pathologic fractures, recent R hip fracture s/p repair in June 2018, asthma on chronic prednisone admitted for severe sepsis and acutely worsened back pain with LE weakness. Infectious work up pending however R. hip/thigh fluid collection MAR RRT NOTE    Briefly, patient is 68 female with early onset Alzheimer disease, osteoporosis c/b pathologic fractures, recent R hip fracture s/p repair by Orthopaedic Surgery in June 2018, asthma on chronic prednisone admitted for severe sepsis (elevated lactate, bandemia) and acutely worsened back pain with LE weakness. Infectious work up pending however R. hip/thigh fluid collection considered possible source. Patient seen by Trauma Surgery and Orthopaedic Surgery without immediate plans for surgical intervention and patient admitted to Medicine.     RRT called for AMS. Patient found to be somnolent, minimally responsive to voice/sternal rub, tachypneic. MAR RRT NOTE    Briefly, patient is 68 female with early onset Alzheimer disease, osteoporosis c/b pathologic fractures, recent R hip fracture s/p repair by Orthopaedic Surgery in June 2018, asthma on chronic prednisone admitted for severe sepsis (elevated lactate, bandemia) and acutely worsened back pain with LE weakness. Infectious work up pending however R. hip/thigh fluid collection considered possible source. Patient seen by Trauma Surgery and Orthopaedic Surgery without immediate plans for surgical intervention. Patient then admitted to Medicine.     RRT called for AMS. Patient found somnolent, minimally responsive to voice/sternal rub, appearing to protect airway, tachypneic, hypotensive to SBP 50s. Patient started on IVF bolus with 0.9NS and Levophed for BP support. Covering NP at bedside reports patient given oxycodone IR 10 few hours prior. Naloxone 0.4 administered after which patient became more responsive but with incoherent speech. Repeat BP improved to SBP 90s on Levophed. Additional 0.9NS bolus given however BPs remains labile. Rectal temp 101 and patient given acetaminophen 750 IV. MICU consulted for septic shock however given possibility of infected joint as source, recommended repeat Orthopaedic eval for possible transfer to SICU. Orthopaedic service and SICU consulted. MAR RRT NOTE    Briefly, patient is 68 female with early onset Alzheimer disease, osteoporosis c/b pathologic fractures, recent R hip fracture s/p repair by Orthopaedic Surgery in June 2018, asthma on chronic prednisone admitted for severe sepsis (elevated lactate, bandemia) and acutely worsened back pain with LE weakness. Infectious work up pending however R. hip/thigh fluid collection considered possible source. Patient seen by Trauma Surgery and Orthopaedic Surgery without immediate plans for surgical intervention. Patient then admitted to Medicine.     RRT called for AMS. Patient found somnolent, minimally responsive to voice/sternal rub, appearing to protect airway, tachypneic, hypotensive to SBP 50s. Patient started on IVF bolus with 0.9NS and Levophed for BP support. . Covering NP at bedside reports patient given oxycodone IR, mirtazepine and few hours prior. Naloxone 0.4 administered after which patient became more responsive but with incoherent speech. Repeat BP improved to SBP 90s on Levophed. Additional 0.9NS bolus given however BPs remains labile. Rectal temp 101, acetaminophen 750 IV given. CBC, ABG, electrolytes, blood cultures sent. Broad spectrum antibiotics with vanc/cefepime started on admission. ABG: pH 7.54, pCO2 20, pO2 299, lactate 1.2 MICU consulted for septic shock however given possibility of infected joint as source recommended repeat Orthopaedic eval. Orthopaedic surgery who accepted patient.  transferred to Orthopaedic service for further management in SICU. MAR RRT NOTE    Briefly, patient is 68 female with early onset Alzheimer disease, osteoporosis c/b pathologic fractures, recent R hip fracture s/p repair by Orthopaedic Surgery in June 2018, asthma on chronic prednisone admitted for severe sepsis (elevated lactate, bandemia) and acutely worsened back pain with LE weakness. Infectious work up pending however R. hip/thigh fluid collection considered possible source. Patient seen by Trauma Surgery and Orthopaedic Surgery without immediate plans for surgical intervention/joint aspiration. Patient admitted to Medicine for medical management.     RRT called for acutely worsened mental status. Patient found somnolent, minimally responsive to voice/sternal rub, appearing to protect airway, tachypneic, hypotensive to SBP 50s. Patient started on IVF bolus with 0.9NS and Levophed for BP support. . Covering NP at bedside reports patient given oxycodone IR and mirtazepine. Naloxone 0.4 administered after which patient more responsive but speech incoherent. Repeat BP improved to SBP 90s on Levophed.  Acetaminophen 750 IV given for rectal temp 101 F. CBC, ABG, electrolytes, blood cultures also sent. Vanc/meropenem started on admission. ABG: pH 7.54, pCO2 20, pO2 299, lactate 1.2. Labs results reviewed. Additional 0.9NS bolus given however BPs remains labile and patient unable to be titrated off Levophed. MICU consulted for septic shock however given possibility of infected joint as source recommended repeat Orthopaedic eval. Patient ordered hydrocortisone 100 IV for possible adrenal insufficiency given chronic prednisone use. Orthopaedic surgery accepted patient who was then transferred to [facility]. Further management per Ortho/SICU team.     MAYI Altman [id number]  [facility] #[id number]

## 2018-07-27 NOTE — CONSULT NOTE ADULT - ASSESSMENT
ASSESSMENT: 68 year old female with PMH of dementia and right hip fracture s/p hemiarthroplasty (6/22/18) presenting with sternal fracture, L2 compression fracture, and left comminuted acetabular fracture.    PLAN:   Neurologic:  - On tylenol for pain; avoid narcotics  - Seroquil at bedtime  -     Respiratory:  - IS, monitor SpO2, monitor sedation    Cardiovascular:    Gastrointestinal/Nutrition:  - Senna & colace    Genitourinary/Renal:    Hematologic:    Infectious Disease:    Endocrine:    Disposition: ASSESSMENT: 68 year old female with PMH of dementia and right hip fracture s/p hemiarthroplasty (6/22/18) presenting with sternal fracture, L2 compression fracture, and left comminuted acetabular fracture.    PLAN:   Neurologic:  - On tylenol for pain; avoid narcotics  - Seroquil at bedtime  - Melatonin at bedtime    Respiratory:  - IS, monitor SpO2, monitor sedation  - Symbicort    Cardiovascular:  - Wean vasopressin and levophed  - Monitor vital signs    Gastrointestinal/Nutrition:  - Senna & colace    Genitourinary/Renal:  - 2% NaCl at 40 cc/hr    Hematologic:  Trend H/H    Infectious Disease:  - Vancomycin  - Cefepime    Endocrine: No issues  - Monitor blood glucose on BMP    Disposition: SICU
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
## 2018-07-27 NOTE — CONSULT NOTE ADULT - SUBJECTIVE AND OBJECTIVE BOX
HISTORY OF PRESENT ILLNESS:  MYRIAM HEATH is a 68y Female   HPI:  68F pmhx of asthma, anxiety/depression, HLD, UC, pHTN presents with weakness.  She was recently hospitalized here for a right hip fracture s/p repair last month.  She is chrissie in by her family who say that on Monday she had the staples removed from her hip surgery.  She was 2 weeks ago developed a hematoma on the right hip after using Plavix with Lovenox for PPX after orhto surgery so this was stopped.  She was doing fine this week until last night when she said that she had to go to the bathroom "30 times" per the  and could not urinate or have BM.  She kept trying to get out of bed and walk to the bathroom and she usually walks with a walker with some assistance but last night she actuely became weak and not able to walk with walker and 2 person assist.  She also was brought to the ED 2 weeks ago for new onset severe back pain with radiation to her left hip.  Her  says this presentation was similar to when she broke her right hip last month (has osteoperosis) and was concerned again but after ED visit said she did not break her hip.  She has been taking her Oxycodone since the surgery Q4hrs w/o pain relief.  Last night she developed rigors, did not take temperature, urinary frequency, increased confusion and agitation along with back pain and LE weakness so she was brought days.     In ED:  T 99.6 rectally HR 60-92 /58 RR 18  She was seen by orthopedics for concern of joint infection in the right hip, given Meropenum and Vancomycin, 2LNs and also 2 amps of D50 for symptomatic (AMS) hypoglycemia. (26 Jul 2018 16:54)    On the floor:  The patient's mental status became altered, started having rigors and her BP dropped to 60s/40s prompting the medicine team to call a RRT. Ortho was called and they accepted the patient and SICU consult was called for hemodynamic monitoring.    PAST MEDICAL HISTORY: CVA (cerebral vascular accident)  Fatty pancreas  Fatty liver  PNA (pneumonia)  Pulmonary HTN  IGT (impaired glucose tolerance)  Ulcerative colitis  Acid reflux  Anxiety  Depression  Mouth sores  HLD (hyperlipidemia)  Asthma    PAST SURGICAL HISTORY: Humeral head fracture  H/O: hysterectomy  H/O cataract extraction, left  History of knee replacement      FAMILY HISTORY: Family history of dementia (Grandparent)  Family history of colon cancer (Grandparent)  No pertinent family history in first degree relatives    CODE STATUS: Full code    HOME MEDICATIONS:    ALLERGIES: ASA; dye contrast (Anaphylaxis)  aspirin (Short breath)  divalproex sodium (Other (Unknown))  Haldol (Other (Unknown))  penicillin (Short breath; Rash)  sulfa drugs (Short breath; Rash)  Xanax (Other (Unknown))      VITAL SIGNS:  ICU Vital Signs Last 24 Hrs  T(C): 36.8 (27 Jul 2018 02:10), Max: 37.9 (26 Jul 2018 22:15)  T(F): 98.2 (27 Jul 2018 02:10), Max: 100.2 (26 Jul 2018 22:15)  HR: 121 (27 Jul 2018 02:10) (60 - 121)  BP: 101/46 (27 Jul 2018 02:10) (99/51 - 149/78)  BP(mean): 66 (27 Jul 2018 02:10) (66 - 66)  ABP: --  ABP(mean): --  RR: 21 (27 Jul 2018 02:10) (18 - 24)  SpO2: 92% (27 Jul 2018 02:10) (92% - 100%)      NEURO  Exam: alert & oriented x 1  acetaminophen   Tablet 650 milliGRAM(s) Oral every 6 hours PRN For Temp greater than 38 C (100.4 F)  acetaminophen  IVPB 750 milliGRAM(s) IV Intermittent once  clonazePAM Tablet 2 milliGRAM(s) Oral at bedtime PRN anxiety  melatonin 3 milliGRAM(s) Oral at bedtime  mirtazapine 45 milliGRAM(s) Oral daily  morphine  - Injectable 2 milliGRAM(s) IV Push every 4 hours PRN breakthrough pain  oxyCODONE    IR 10 milliGRAM(s) Oral every 4 hours PRN Pain  QUEtiapine 50 milliGRAM(s) Oral at bedtime  sertraline 50 milliGRAM(s) Oral daily    RESPIRATORY  Mechanical Ventilation:   ABG - ( 27 Jul 2018 01:26 )  pH: 7.54  /  pCO2: 20    /  pO2: 299   / HCO3: 17    / Base Excess: -3.8  /  SaO2: 100     Lactate: x        Exam: no increased wob  buDESOnide 160 MICROgram(s)/formoterol 4.5 MICROgram(s) Inhaler 2 Puff(s) Inhalation two times a day      CARDIOVASCULAR  VBG - ( 26 Jul 2018 16:58 )  pH: 7.32  /  pCO2: 45    /  pO2: 23    / HCO3: 22    / Base Excess: -3.1  /  SaO2: 31     Lactate: 2.1        Exam:  Cardiac Rhythm: tachycardia, regular rhythm  norepinephrine Infusion 0.03 MICROgram(s)/kG/Min IV Continuous <Continuous>      GI/NUTRITION  Exam:  Diet:  docusate sodium 100 milliGRAM(s) Oral daily  senna 2 Tablet(s) Oral at bedtime      GENITOURINARY/RENAL  sodium chloride 0.9% Bolus 1000 milliLiter(s) IV Bolus once  sodium chloride 2% . 1000 milliLiter(s) IV Continuous <Continuous>      Weight (kg): 50.4 (07-27 @ 02:10)  07-26    126<L>  |  92<L>  |  28<H>  ----------------------------<  149<H>  4.7   |  18<L>  |  1.06    Ca    8.2<L>      26 Jul 2018 22:57  Mg     2.1     07-26    TPro  6.2  /  Alb  3.5  /  TBili  0.7  /  DBili  x   /  AST  30  /  ALT  15  /  AlkPhos  216<H>  07-26    [x] Blackman catheter, indication: urine output monitoring in critically ill patient    HEMATOLOGIC  [x] VTE Prophylaxis:  heparin  Injectable 5000 Unit(s) SubCutaneous every 8 hours                          10.0   12.1  )-----------( 114      ( 27 Jul 2018 01:02 )             29.4     PT/INR - ( 26 Jul 2018 21:10 )   PT: 16.3 sec;   INR: 1.48 ratio         PTT - ( 26 Jul 2018 21:10 )  PTT:33.0 sec  Transfusion: [ ] PRBC	[ ] Platelets	[ ] FFP	[ ] Cryoprecipitate      INFECTIOUS DISEASES  cefepime   IVPB 1000 milliGRAM(s) IV Intermittent every 12 hours  vancomycin  IVPB 750 milliGRAM(s) IV Intermittent every 12 hours    RECENT CULTURES:      ENDOCRINE  dextrose 40% Gel 15 Gram(s) Oral once PRN  dextrose 50% Injectable 12.5 Gram(s) IV Push once  dextrose 50% Injectable 25 Gram(s) IV Push once  dextrose 50% Injectable 25 Gram(s) IV Push once  glucagon  Injectable 1 milliGRAM(s) IntraMuscular once PRN  predniSONE   Tablet 10 milliGRAM(s) Oral daily    CAPILLARY BLOOD GLUCOSE      POCT Blood Glucose.: 110 mg/dL (27 Jul 2018 00:37)  POCT Blood Glucose.: 121 mg/dL (26 Jul 2018 21:01)  POCT Blood Glucose.: 127 mg/dL (26 Jul 2018 17:01)  POCT Blood Glucose.: 160 mg/dL (26 Jul 2018 13:44)  POCT Blood Glucose.: 174 mg/dL (26 Jul 2018 13:42)  POCT Blood Glucose.: 23 mg/dL (26 Jul 2018 12:40)  POCT Blood Glucose.: 19 mg/dL (26 Jul 2018 12:39)  POCT Blood Glucose.: 44 mg/dL (26 Jul 2018 12:36)  POCT Blood Glucose.: 23 mg/dL (26 Jul 2018 12:35)  POCT Blood Glucose.: 53 mg/dL (26 Jul 2018 11:59)  POCT Blood Glucose.: 49 mg/dL (26 Jul 2018 11:58)      PATIENT CARE ACCESS DEVICES:  [x] Peripheral IV  [ ] Central Venous Line	[ ] R	[ ] L	[ ] IJ	[ ] Fem	[ ] SC	Placed:   [ ] Arterial Line		[ ] R	[ ] L	[ ] Fem	[ ] Rad	[ ] Ax	Placed:   [ ] PICC:					[ ] Mediport  [ ] Urinary Catheter, Date Placed:   [x] Necessity of urinary, arterial, and venous catheters discussed    OTHER MEDICATIONS: naloxone Injectable 0.4 milliGRAM(s) IV Push once    IMAGING STUDIES:    < from: CT Abdomen and Pelvis No Cont (07.26.18 @ 17:19) >  A 17.0 cm collection along the right lateral thigh musculature adjacent   to the right hip prosthesis. Superimposed infection is not excluded.     New compression fracture of the L2 vertebral body as above. Correlate   with pending lumbar spine MRI.    Mildly depressed acute fracture of the sternal manubrium.    Acute comminuted fracture of the left acetabulum.    < end of copied text > HISTORY OF PRESENT ILLNESS:  MYRIAM HEATH is a 68y Female w/ a pmhx of asthma, anxiety/depression, HLD, UC, pHTN presents with weakness.  She was recently hospitalized here for a right hip fracture s/p repair last month.  She is chrissie in by her family who say that on Monday she had the staples removed from her hip surgery.  She was 2 weeks ago developed a hematoma on the right hip after using Plavix with Lovenox for PPX after orhto surgery so this was stopped.  She was doing fine this week until last night when she said that she had to go to the bathroom "30 times" per the  and could not urinate or have BM.  She kept trying to get out of bed and walk to the bathroom and she usually walks with a walker with some assistance but last night she actuely became weak and not able to walk with walker and 2 person assist.  She also was brought to the ED 2 weeks ago for new onset severe back pain with radiation to her left hip.  Her  says this presentation was similar to when she broke her right hip last month (has osteoperosis) and was concerned again but after ED visit said she did not break her hip.  She has been taking her Oxycodone since the surgery Q4hrs w/o pain relief.  Last night she developed rigors, did not take temperature, urinary frequency, increased confusion and agitation along with back pain and LE weakness so she was brought days.     In ED:  T 99.6 rectally HR 60-92 /58 RR 18  She was seen by orthopedics for concern of joint infection in the right hip, given Meropenum and Vancomycin, 2LNs and also 2 amps of D50 for symptomatic (AMS) hypoglycemia. (26 Jul 2018 16:54)    On the floor:  The patient's mental status became altered, started having rigors and her BP dropped to 60s/40s prompting the medicine team to call a RRT. Ortho was called and they accepted the patient and SICU consult was called for hemodynamic monitoring.    PAST MEDICAL HISTORY: CVA (cerebral vascular accident)  Fatty pancreas  Fatty liver  PNA (pneumonia)  Pulmonary HTN  IGT (impaired glucose tolerance)  Ulcerative colitis  Acid reflux  Anxiety  Depression  Mouth sores  HLD (hyperlipidemia)  Asthma    PAST SURGICAL HISTORY: Humeral head fracture  H/O: hysterectomy  H/O cataract extraction, left  History of knee replacement      FAMILY HISTORY: Family history of dementia (Grandparent)  Family history of colon cancer (Grandparent)  No pertinent family history in first degree relatives    CODE STATUS: Full code    HOME MEDICATIONS:    ALLERGIES: ASA; dye contrast (Anaphylaxis)  aspirin (Short breath)  divalproex sodium (Other (Unknown))  Haldol (Other (Unknown))  penicillin (Short breath; Rash)  sulfa drugs (Short breath; Rash)  Xanax (Other (Unknown))      VITAL SIGNS:  ICU Vital Signs Last 24 Hrs  T(C): 36.8 (27 Jul 2018 02:10), Max: 37.9 (26 Jul 2018 22:15)  T(F): 98.2 (27 Jul 2018 02:10), Max: 100.2 (26 Jul 2018 22:15)  HR: 121 (27 Jul 2018 02:10) (60 - 121)  BP: 101/46 (27 Jul 2018 02:10) (99/51 - 149/78)  BP(mean): 66 (27 Jul 2018 02:10) (66 - 66)  ABP: --  ABP(mean): --  RR: 21 (27 Jul 2018 02:10) (18 - 24)  SpO2: 92% (27 Jul 2018 02:10) (92% - 100%)      NEURO  Exam: alert & oriented x 1  acetaminophen   Tablet 650 milliGRAM(s) Oral every 6 hours PRN For Temp greater than 38 C (100.4 F)  acetaminophen  IVPB 750 milliGRAM(s) IV Intermittent once  clonazePAM Tablet 2 milliGRAM(s) Oral at bedtime PRN anxiety  melatonin 3 milliGRAM(s) Oral at bedtime  mirtazapine 45 milliGRAM(s) Oral daily  morphine  - Injectable 2 milliGRAM(s) IV Push every 4 hours PRN breakthrough pain  oxyCODONE    IR 10 milliGRAM(s) Oral every 4 hours PRN Pain  QUEtiapine 50 milliGRAM(s) Oral at bedtime  sertraline 50 milliGRAM(s) Oral daily    RESPIRATORY  Mechanical Ventilation:   ABG - ( 27 Jul 2018 01:26 )  pH: 7.54  /  pCO2: 20    /  pO2: 299   / HCO3: 17    / Base Excess: -3.8  /  SaO2: 100     Lactate: x        Exam: no increased wob  buDESOnide 160 MICROgram(s)/formoterol 4.5 MICROgram(s) Inhaler 2 Puff(s) Inhalation two times a day      CARDIOVASCULAR  VBG - ( 26 Jul 2018 16:58 )  pH: 7.32  /  pCO2: 45    /  pO2: 23    / HCO3: 22    / Base Excess: -3.1  /  SaO2: 31     Lactate: 2.1        Exam:  Cardiac Rhythm: tachycardia, regular rhythm  norepinephrine Infusion 0.03 MICROgram(s)/kG/Min IV Continuous <Continuous>      GI/NUTRITION  Exam: soft, NT/ND  Diet: NPO (previously: regular)  docusate sodium 100 milliGRAM(s) Oral daily  senna 2 Tablet(s) Oral at bedtime      GENITOURINARY/RENAL  sodium chloride 0.9% Bolus 1000 milliLiter(s) IV Bolus once  sodium chloride 2% . 1000 milliLiter(s) IV Continuous <Continuous>      Weight (kg): 50.4 (07-27 @ 02:10)  07-26    126<L>  |  92<L>  |  28<H>  ----------------------------<  149<H>  4.7   |  18<L>  |  1.06    Ca    8.2<L>      26 Jul 2018 22:57  Mg     2.1     07-26    TPro  6.2  /  Alb  3.5  /  TBili  0.7  /  DBili  x   /  AST  30  /  ALT  15  /  AlkPhos  216<H>  07-26    [x] Blackman catheter, indication: urine output monitoring in critically ill patient    HEMATOLOGIC  [x] VTE Prophylaxis:  heparin  Injectable 5000 Unit(s) SubCutaneous every 8 hours                          10.0   12.1  )-----------( 114      ( 27 Jul 2018 01:02 )             29.4     PT/INR - ( 26 Jul 2018 21:10 )   PT: 16.3 sec;   INR: 1.48 ratio         PTT - ( 26 Jul 2018 21:10 )  PTT:33.0 sec  Transfusion: [ ] PRBC	[ ] Platelets	[ ] FFP	[ ] Cryoprecipitate      INFECTIOUS DISEASES  cefepime   IVPB 1000 milliGRAM(s) IV Intermittent every 12 hours  vancomycin  IVPB 750 milliGRAM(s) IV Intermittent every 12 hours    RECENT CULTURES:      ENDOCRINE  dextrose 40% Gel 15 Gram(s) Oral once PRN  dextrose 50% Injectable 12.5 Gram(s) IV Push once  dextrose 50% Injectable 25 Gram(s) IV Push once  dextrose 50% Injectable 25 Gram(s) IV Push once  glucagon  Injectable 1 milliGRAM(s) IntraMuscular once PRN  predniSONE   Tablet 10 milliGRAM(s) Oral daily    CAPILLARY BLOOD GLUCOSE      POCT Blood Glucose.: 110 mg/dL (27 Jul 2018 00:37)  POCT Blood Glucose.: 121 mg/dL (26 Jul 2018 21:01)  POCT Blood Glucose.: 127 mg/dL (26 Jul 2018 17:01)  POCT Blood Glucose.: 160 mg/dL (26 Jul 2018 13:44)  POCT Blood Glucose.: 174 mg/dL (26 Jul 2018 13:42)  POCT Blood Glucose.: 23 mg/dL (26 Jul 2018 12:40)  POCT Blood Glucose.: 19 mg/dL (26 Jul 2018 12:39)  POCT Blood Glucose.: 44 mg/dL (26 Jul 2018 12:36)  POCT Blood Glucose.: 23 mg/dL (26 Jul 2018 12:35)  POCT Blood Glucose.: 53 mg/dL (26 Jul 2018 11:59)  POCT Blood Glucose.: 49 mg/dL (26 Jul 2018 11:58)      PATIENT CARE ACCESS DEVICES:  [x] Peripheral IV  [ ] Central Venous Line	[ ] R	[ ] L	[ ] IJ	[ ] Fem	[ ] SC	Placed:   [ ] Arterial Line		[ ] R	[ ] L	[ ] Fem	[ ] Rad	[ ] Ax	Placed:   [ ] PICC:					[ ] Mediport  [ ] Urinary Catheter, Date Placed:   [x] Necessity of urinary, arterial, and venous catheters discussed    OTHER MEDICATIONS: naloxone Injectable 0.4 milliGRAM(s) IV Push once    IMAGING STUDIES:    < from: CT Abdomen and Pelvis No Cont (07.26.18 @ 17:19) >  A 17.0 cm collection along the right lateral thigh musculature adjacent   to the right hip prosthesis. Superimposed infection is not excluded.     New compression fracture of the L2 vertebral body as above. Correlate   with pending lumbar spine MRI.    Mildly depressed acute fracture of the sternal manubrium.    Acute comminuted fracture of the left acetabulum.    < end of copied text >

## 2018-07-27 NOTE — CONSULT NOTE ADULT - SUBJECTIVE AND OBJECTIVE BOX
TRAUMA CONSULT NOTE  --------------------------------------------------------------------------------------------      HPI: 68 year old female with PMH of asthma, dementia, HLD, UC, frequent UTIs, GERD, and right hip fracture 1 month ago presents with weakness, diaphoresis, altered mental status, and increased urinary frequency. WBC count on admission was 21 and CT chest/abdomen/pelvis showed mildly depressed sternal fracture, left comminuted acetabular fracture, L2 compression fracture, and right lateral thigh collection adjacent to hip prosthesis. Patient denies any shortness of breath or chest pain. Currently complaining of pain in the left hip and bilateral knees. Per the family, she has been ambulatory at home with a walker and has not had a witnessed falls or trauma. She was seen in the [facility] ED on  for left hip pain - at that time x-ray showed no acute injury and she was offered an MRI and admission for pain control but the family elected to take her home.      PAST MEDICAL & SURGICAL HISTORY:  CVA (cerebral vascular accident)  Fatty pancreas  PNA (pneumonia)  Pulmonary HTN  IGT (impaired glucose tolerance)  Ulcerative colitis  Acid reflux  Anxiety  Depression  Mouth sores  HLD (hyperlipidemia)  Asthma  Humeral head fracture  H/O: hysterectomy  H/O cataract extraction, left  History of knee replacement    FAMILY HISTORY:  Family history of dementia (Grandparent)  Family history of colon cancer (Grandparent)    ALLERGIES: ASA; dye contrast (Anaphylaxis)  aspirin (Short breath)  divalproex sodium (Other (Unknown))  Haldol (Other (Unknown))  penicillin (Short breath; Rash)  sulfa drugs (Short breath; Rash)  Xanax (Other (Unknown))      CURRENT MEDICATIONS  MEDICATIONS (STANDING): buDESOnide 160 MICROgram(s)/formoterol 4.5 MICROgram(s) Inhaler 2 Puff(s) Inhalation two times a day  cefepime   IVPB 1000 milliGRAM(s) IV Intermittent every 12 hours  dextrose 5%. 1000 milliLiter(s) IV Continuous <Continuous>  dextrose 50% Injectable 12.5 Gram(s) IV Push once  dextrose 50% Injectable 25 Gram(s) IV Push once  dextrose 50% Injectable 25 Gram(s) IV Push once  docusate sodium 100 milliGRAM(s) Oral daily  melatonin 3 milliGRAM(s) Oral at bedtime  mirtazapine 45 milliGRAM(s) Oral daily  predniSONE   Tablet 10 milliGRAM(s) Oral daily  QUEtiapine 50 milliGRAM(s) Oral at bedtime  senna 2 Tablet(s) Oral at bedtime  sertraline 50 milliGRAM(s) Oral daily  sodium chloride 2% . 1000 milliLiter(s) IV Continuous <Continuous>  vancomycin  IVPB 750 milliGRAM(s) IV Intermittent every 12 hours    MEDICATIONS (PRN):acetaminophen   Tablet 650 milliGRAM(s) Oral every 6 hours PRN For Temp greater than 38 C (100.4 F)  clonazePAM Tablet 2 milliGRAM(s) Oral at bedtime PRN anxiety  dextrose 40% Gel 15 Gram(s) Oral once PRN Blood Glucose LESS THAN 70 milliGRAM(s)/deciliter  glucagon  Injectable 1 milliGRAM(s) IntraMuscular once PRN Glucose LESS THAN 70 milligrams/deciliter  morphine  - Injectable 2 milliGRAM(s) IV Push every 4 hours PRN breakthrough pain  oxyCODONE    IR 10 milliGRAM(s) Oral every 4 hours PRN Pain    --------------------------------------------------------------------------------------------    Vitals:   T(C): 37.9 (18 @ 22:15), Max: 37.9 (18 @ 22:15)  HR: 100 (18 @ 21:30) (60 - 100)  BP: 124/93 (18 @ 21:30) (99/51 - 149/78)  RR: 24 (18 @ 21:30) (18 - 24)  SpO2: 97% (18 @ 21:30) (97% - 100%)  CAPILLARY BLOOD GLUCOSE      POCT Blood Glucose.: 121 mg/dL (2018 21:01)  POCT Blood Glucose.: 127 mg/dL (2018 17:01)  POCT Blood Glucose.: 160 mg/dL (2018 13:44)  POCT Blood Glucose.: 174 mg/dL (2018 13:42)  POCT Blood Glucose.: 23 mg/dL (2018 12:40)  POCT Blood Glucose.: 19 mg/dL (2018 12:39)  POCT Blood Glucose.: 44 mg/dL (2018 12:36)  POCT Blood Glucose.: 23 mg/dL (2018 12:35)  POCT Blood Glucose.: 53 mg/dL (2018 11:59)  POCT Blood Glucose.: 49 mg/dL (2018 11:58)        Height (cm): 142.24 ( @ 21:30)  Weight (kg): 57.9 ( @ 21:30)  BMI (kg/m2): 28.6 ( @ 21:30)  BSA (m2): 1.47 ( @ 21:30)    PHYSICAL EXAM:  General: Awake, confused, agitated  HEENT: NC/AT, no asymmetry, no scleral icterus, no subconjunctival hemorrhage, no periorbital ecchymosis or swelling  Neck: Soft, supple, no crepitus  Cardio: RRR, nml S1/S2  Resp: Airway patent, unlabored breathing, bilateral breath sounds  Thorax: Sternal tenderness, no lateral chest wall tenderness, no bruising, no deformities  GI/Abd: Soft, NT/ND, no rebound/guarding, no masses palpated, no ecchymosis  Vascular: All 4 extremities warm, B/l radial pulses palpable  Skin: Intact, no breakdown  Extremities: All 4 extremities moving spontaneously, bilateral knee pain with flexion, no LE edema, R hip incision with ecchymosis and swelling  --------------------------------------------------------------------------------------------    LABS  CBC ( @ 12:39)                              11.0<L>                         21.3<H>  )----------------(  270        73.0  % Neutrophils, --    % Lymphocytes, ANC: 20.4<H>                              32.3<L>    BMP ( @ 22:57)             126<L>  |  92<L>   |  28<H> 		Ca++ --      Ca 8.2<L>             ---------------------------------( 149<H>		Mg --                 4.7     |  18<L>   |  1.06  			Ph --      BMP ( @ 18:58)             120<LL>  |  88<L>   |  28<H> 		Ca++ --      Ca 7.8<L>             ---------------------------------( 165<H>		Mg 2.1                4.9     |  16<L>   |  1.04  			Ph --        LFTs ( @ 12:39)      TPro 6.2 / Alb 3.5 / TBili 0.7 / DBili -- / AST 30 / ALT 15 / AlkPhos 216<H>    Coags ( @ 21:10)  aPTT 33.0 / INR 1.48<H> / PT 16.3<H>    Cardiac Markers ( 18:58)     HSTrop: -- / CKMB: -- / CK: 121  Cardiac Markers ( @ 13:52)     HSTrop: -- / CKMB: -- / CK: 102      VBG ( @ 16:58)     7.32<L> / 45 / 23<L> / 22 / -3.1<L> / 31<L>%     Lactate: 2.1<H>  VBG ( @ 12:39)     7.36 / 41 / 23<L> / 22 / -2.2<L> / 34<L>%     Lactate: 2.5<H>    --------------------------------------------------------------------------------------------    MICROBIOLOGY  Urinalysis ( @ 13:32):     Color: Yellow / Appearance: Clear / S.023 / pH: 6.0 / Gluc: Negative / Ketones: Negative / Bili: Negative / Urobili: Negative / Protein :Trace / Nitrites: Negative / Leuk.Est: Negative / RBC: 0-2 / WBC: 3-5 / Sq Epi:  / Non Sq Epi:  / Bacteria        --------------------------------------------------------------------------------------------

## 2018-07-27 NOTE — CHART NOTE - NSCHARTNOTEFT_GEN_A_CORE
Limited exam; Intubated/sedated and on pressors.    T(C): 36.3 (07-27-18 @ 19:00)  T(F): 97.4 (07-27-18 @ 19:00)  HR: 77 (07-27-18 @ 20:15)  BP: 76/51 (07-27-18 @ 20:15)  RR: 16 (07-27-18 @ 20:15)  SpO2: 100% (07-27-18 @ 20:15)      Exam:  Alert and Oriented, No Acute Distress    R Lower Extremity:  Hip Dsg CDI  HMV x2 intact with good suction   +DP Pulse                          7.9    10.4  )-----------( 181      ( 27 Jul 2018 18:49 )             23.8        128<L>  |  101  |  25<H>  ----------------------------<  303<H>  5.4<H>   |  17<L>  |  0.82      Post-op Pelvis XR done.    A/P: 68y Female s/p R Irrigation and debridement of hip & exchange of femoral head component; Stable  -Pain Control  -DVT PPx; IS  -Am labs  -PT Eval when appropriate-WBAT RLE, FFWB LLE  -Continue current tx; Appreciate SICU care  -Would consider 1-2U PRBC      Cathy Lozano PA-C  Orthopedic Surgery  [facility] [phone]

## 2018-07-27 NOTE — PROGRESS NOTE ADULT - SUBJECTIVE AND OBJECTIVE BOX
---___---___---___---___---___---___ ---___---___---___---___---___---___---___---___---___---                    <<<  M E D I C A L   A T T E N D I N G    F O L L O W    U P   N O T E  >>>    admitted for acetabular fracture on the left . upgraded to SICU for septic shock  now in bed  restrained has central line in plce   ---___---___---___---___---___---      <<<  MEDICATIONS:  >>>    MEDICATIONS  (STANDING):  buDESOnide 160 MICROgram(s)/formoterol 4.5 MICROgram(s) Inhaler 2 Puff(s) Inhalation two times a day  cefepime   IVPB 1000 milliGRAM(s) IV Intermittent every 12 hours  chlorhexidine 4% Liquid 1 Application(s) Topical <User Schedule>  dextrose 5% + sodium chloride 0.9%. 1000 milliLiter(s) (100 mL/Hr) IV Continuous <Continuous>  docusate sodium 100 milliGRAM(s) Oral daily  insulin lispro (HumaLOG) corrective regimen sliding scale   SubCutaneous every 4 hours  melatonin 3 milliGRAM(s) Oral at bedtime  mirtazapine 45 milliGRAM(s) Oral at bedtime  norepinephrine Infusion 0.05 MICROgram(s)/kG/Min (4.725 mL/Hr) IV Continuous <Continuous>  predniSONE   Tablet 10 milliGRAM(s) Oral daily  QUEtiapine 50 milliGRAM(s) Oral at bedtime  senna 2 Tablet(s) Oral at bedtime  sertraline 50 milliGRAM(s) Oral daily  vancomycin  IVPB 750 milliGRAM(s) IV Intermittent every 12 hours  vasopressin Infusion 0.03 Unit(s)/Min (1.8 mL/Hr) IV Continuous <Continuous>      MEDICATIONS  (PRN):  oxyCODONE    IR 5 milliGRAM(s) Oral every 4 hours PRN Moderate Pain (4 - 6)       ---___---___---___---___---___---     <<<REVIEW OF SYSTEM: >>>    GEN: no fever, no chills,  pain  RESP: no SOB, no cough, no sputum  CVS: no chest pain, no palpitations, no edema  GI: no abdominal pain, no nausea, no vomiting, no constipation, no diarrhea  : no dysurea, no frequency  NEURO: no headache, no dizziness  PSYCH: no depression, not anxious  Derm : no itching, no rash     ---___---___---___---___---___---          <<<  VITAL SIGNS: >>>    T(F): 98.2 (18 @ 02:10), Max: 100.2 (18 @ 22:15)  HR: 111 (18 @ 06:30) (60 - 126)  BP: 104/55 (18 @ 03:45) (80/42 - 149/78)  RR: 38 (18 @ 06:30) (11 - 38)  SpO2: 97% (18 @ 06:30) (92% - 100%)  Wt(kg): --  CAPILLARY BLOOD GLUCOSE      POCT Blood Glucose.: 131 mg/dL (2018 06:34)    I&O's Summary    2018 07:01  -  2018 07:00  --------------------------------------------------------  IN: 112.6 mL / OUT: 180 mL / NET: -67.4 mL         ---___---___---___---___---___---                       PHYSICAL EXAM:    GEN: A&O X 3 , NAD , comfortable  HEENT: NCAT, PERRL, MMM, no scleral icterus, hearing intact  NECK: Supple, No JVD  CVS: S1S2 , regular , No M/R/G appreciated  PULM: CTA B/L,  no W/R/R appreciated  ABD.: soft. non tender, non distended,  bowel sounds present  Extrem: pain to the hips blaterally   Derm: No rash or ecchymosis noted  PSYCH: normal mood, no depression, not anxious     ---___---___---___---___---___---     <<<  LAB AND IMAGING: >>>                          9.0    12.6  )-----------( 195      ( 2018 05:32 )             27.0                   129<L>  |  101  |  27<H>  ----------------------------<  99  3.4<L>   |  16<L>  |  1.07    Ca    7.3<L>      2018 05:32  Phos  2.5       Mg     1.8         TPro  4.6<L>  /  Alb  2.4<L>  /  TBili  0.4  /  DBili  0.2  /  AST  14  /  ALT  10  /  AlkPhos  162<H>      PT/INR - ( 2018 05:32 )   PT: 16.5 sec;   INR: 1.50 ratio         PTT - ( 2018 05:32 )  PTT:35.8 sec       Urinalysis Basic - ( 2018 04:56 )    Color: Yellow / Appearance: Clear / S.019 / pH: x  Gluc: x / Ketone: Negative  / Bili: Negative / Urobili: Negative   Blood: x / Protein: Trace / Nitrite: Negative   Leuk Esterase: Large / RBC: 0-2 /HPF / WBC 2-5 /HPF   Sq Epi: x / Non Sq Epi: x / Bacteria: x      CARDIAC MARKERS ( 2018 05:32 )  x     / x     / 65 U/L / x     / 3.7 ng/mL  CARDIAC MARKERS ( 2018 01:03 )  x     / x     / 89 U/L / x     / 3.8 ng/mL  CARDIAC MARKERS ( 2018 18:58 )  x     / x     / 121 U/L / x     / 3.8 ng/mL  CARDIAC MARKERS ( 2018 13:52 )  x     / x     / 102 U/L / x     / 4.4 ng/mL            ABG - ( 2018 05:27 )  pH, Arterial: 7.36  pH, Blood: x     /  pCO2: 30    /  pO2: 105   / HCO3: 16    / Base Excess: -7.6  /  SaO2: 98                      [All pertinent / recent available Imaging reports and other labs reviewed]     ---___---___---___---___---___---___ ---___---___---___---___---           <<<  A S S E S S M E N T   A N D   P L A N :  >>>          -GI/DVT Prophylaxis.    --------------------------------------------  Case discussed with mr roque the  and daughters present   Education given on     >>______________________<<      Deniz Bolden .         phone   [phone]

## 2018-07-27 NOTE — PROGRESS NOTE ADULT - SUBJECTIVE AND OBJECTIVE BOX
CC: f/u for high grade MRSA bacteremia, severe sepsis    Patient in SICU, on Levo for BP support, confused at times, but cooperative    REVIEW OF SYSTEMS:  Limited, confused     Antimicrobials Day # 2  cefepime   IVPB 1000 milliGRAM(s) IV Intermittent every 12 hours  vancomycin  IVPB 750 milliGRAM(s) IV Intermittent every 12 hours    Other Medications Reviewed    T(F): 98.6 (18 @ 07:00), Max: 100.2 (18 @ 22:15)  HR: 117 (18 @ 11:00)  BP: 91/47 (18 @ 09:30)  RR: 41 (18 @ 11:00)  SpO2: 98% (18 @ 11:00)  Wt(kg): --    PHYSICAL EXAM:  General: alert, no acute distress  Eyes:  anicteric, no conjunctival injection, no discharge  Oropharynx: no lesions or injection 	  Neck: LIJ CVL  Lungs: clear to auscultation  Heart: regular rate and rhythm; no murmur, rubs or gallops  Abdomen: soft, nondistended, nontender, without mass or organomegaly  Blackman  Skin: R hip incision mild induration, no erythema  Extremities: no edema  Neurologic: alert, moves all extremities    LAB RESULTS:                        9.2    10.0  )-----------( 164      ( 2018 09:44 )             27.7         127<L>  |  101  |  28<H>  ----------------------------<  139<H>  4.6   |  13<L>  |  0.99    Ca    7.7<L>      2018 09:44  Phos  2.9       Mg     1.8         TPro  4.6<L>  /  Alb  2.4<L>  /  TBili  0.4  /  DBili  0.2  /  AST  14  /  ALT  10  /  AlkPhos  162<H>      LIVER FUNCTIONS - ( 2018 05:32 )  Alb: 2.4 g/dL / Pro: 4.6 g/dL / ALK PHOS: 162 U/L / ALT: 10 U/L / AST: 14 U/L / GGT: x           Urinalysis Basic - ( 2018 04:56 )    Color: Yellow / Appearance: Clear / S.019 / pH: x  Gluc: x / Ketone: Negative  / Bili: Negative / Urobili: Negative   Blood: x / Protein: Trace / Nitrite: Negative   Leuk Esterase: Large / RBC: 0-2 /HPF / WBC 2-5 /HPF   Sq Epi: x / Non Sq Epi: x / Bacteria: x      MICROBIOLOGY:  RECENT CULTURES:   @ 14:43 .Blood Blood-Peripheral Blood Culture PCR    Growth in aerobic and anaerobic bottles: Gram Positive Cocci in Clusters  Growth in aerobic and anaerobic bottles: Gram Positive Cocci in Clusters    Culture - Blood (18 @ 14:43)    Gram Stain:   Growth in aerobic and anaerobic bottles: Gram Positive Cocci in Clusters    -  Methicillin resistant Staphylococcus aureus (MRSA): Detec       RADIOLOGY REVIEWED:  Xray Chest 1 View- PORTABLE-Urgent (18 @ 05:16) >  No consolidation, pleural effusion or   pneumothorax is seen.    CT Abdomen and Pelvis No Cont (18 @ 17:19) >  A 17.0 cm collection along the right lateral thigh musculature adjacent   to the right hip prosthesis. Superimposed infection is not excluded.     New compression fracture of the L2 vertebral body as above. Correlate   with pending lumbar spine MRI.    Mildly depressed acute fracture of the sternal manubrium.    Acute comminuted fracture of the left acetabulum.

## 2018-07-27 NOTE — PROGRESS NOTE ADULT - SUBJECTIVE AND OBJECTIVE BOX
Patient seen and examined. was brought to the SICU for sepsis. patient found to be bacteremic. currently awake and alert complaining of left hip pain    Allergies    ASA; dye contrast (Anaphylaxis)  aspirin (Short breath)  divalproex sodium (Other (Unknown))  Haldol (Other (Unknown))  penicillin (Short breath; Rash)  sulfa drugs (Short breath; Rash)  Xanax (Other (Unknown))    Intolerances                            9.0    12.6  )-----------( 195      ( 27 Jul 2018 05:32 )             27.0     27 Jul 2018 05:32    129    |  101    |  27     ----------------------------<  99     3.4     |  16     |  1.07     Ca    7.3        27 Jul 2018 05:32  Phos  2.5       27 Jul 2018 05:32  Mg     1.8       27 Jul 2018 05:32    TPro  4.6    /  Alb  2.4    /  TBili  0.4    /  DBili  0.2    /  AST  14     /  ALT  10     /  AlkPhos  162    27 Jul 2018 05:32    PT/INR - ( 27 Jul 2018 05:32 )   PT: 16.5 sec;   INR: 1.50 ratio         PTT - ( 27 Jul 2018 05:32 )  PTT:35.8 sec  Vital Signs Last 24 Hrs  T(C): 36.8 (07-27-18 @ 02:10), Max: 37.9 (07-26-18 @ 22:15)  T(F): 98.2 (07-27-18 @ 02:10), Max: 100.2 (07-26-18 @ 22:15)  HR: 111 (07-27-18 @ 06:30) (60 - 126)  BP: 104/55 (07-27-18 @ 03:45) (80/42 - 149/78)  BP(mean): 78 (07-27-18 @ 03:45) (56 - 78)  RR: 38 (07-27-18 @ 06:30) (11 - 38)  SpO2: 97% (07-27-18 @ 06:30) (92% - 100%)    Physical Exam  Gen: NAD  RLE:   incision is weal healed, no drainage, no breakdown, no erythema or edema  Full ROM of right hip without any pain  +ehl/fhl/ta/gs function  L2-S1 silt  Dp/pt pulse intact  No calf ttp  Compartments soft    LLE:   skin intact  unable to SLR  painful ROM of hip  +ehl/fhl/ta/gs function  L2-S1 silt  Dp/pt pulse intact  No calf ttp  Compartments soft    A/P:  68y Female with bacteremia and sepsis, L acetabulum fracture  Pain control  DVT ppx, per sicu  NWB LLE  WBAT RLE  FU labs  FU sepsis workup  Medical management appreciated  will need CRPP for L acetabulum fracture after patient clears bacteremia and is medically stable

## 2018-07-27 NOTE — PROGRESS NOTE ADULT - SUBJECTIVE AND OBJECTIVE BOX
68F pmhx of asthma, anxiety/depression, HLD, UC, alzheimer's disease HTN right hip fracture s/p hemiarthroplasty on 6/22/18 with known right hip hematoma admitted with sepsis. Found to have MRS bacteriemia. CT on 7/26 demonstrating a" 17.0 cm collection along the right lateral thigh musculature adjacent to   the right hip prosthesis. Superimposed infection is not excluded" . IR requested for drainage of right hip fluid collection.       Allergies:ASA; dye contrast (Anaphylaxis)  aspirin (Short breath)  divalproex sodium (Other (Unknown))  Haldol (Other (Unknown))  penicillin (Short breath; Rash)  sulfa drugs (Short breath; Rash)  Xanax (Other (Unknown))      PAST MEDICAL & SURGICAL HISTORY:  CVA (cerebral vascular accident)  Fatty pancreas  PNA (pneumonia)  Pulmonary HTN  IGT (impaired glucose tolerance)  Ulcerative colitis  Acid reflux  Anxiety  Depression  Mouth sores  HLD (hyperlipidemia)  Asthma  Humeral head fracture  H/O: hysterectomy  H/O cataract extraction, left  History of knee replacement        Pertinent labs:                      9.2    10.0  )-----------( 164      ( 27 Jul 2018 09:44 )             27.7   07-27    127<L>  |  101  |  28<H>  ----------------------------<  139<H>  4.6   |  13<L>  |  0.99    Ca    7.7<L>      27 Jul 2018 09:44  Phos  2.9     07-27  Mg     1.8     07-27    TPro  4.6<L>  /  Alb  2.4<L>  /  TBili  0.4  /  DBili  0.2  /  AST  14  /  ALT  10  /  AlkPhos  162<H>  07-27  PT/INR - ( 27 Jul 2018 05:32 )   PT: 16.5 sec;   INR: 1.50 ratio         PTT - ( 27 Jul 2018 05:32 )  PTT:35.8 sec      Consent: Procedure/risks/ Benefits explained. [facility]  verbalizes understanding.

## 2018-07-27 NOTE — CONSULT NOTE ADULT - ASSESSMENT
68F pmhx of asthma, Alzheimer's anxiety/depression, HLD, UC, HTN, S/P right hip fracture s/p hip hemiarthroplasty after a fracture 1month ago, presented with weakness, worsening mentation, found to have sepsis, hyponatremia of 125->120->now 129.

## 2018-07-27 NOTE — BRIEF OPERATIVE NOTE - PROCEDURE
<<-----Click on this checkbox to enter Procedure Bone surgery  07/27/2018  exchange of femoral head component  Active  PGOLD2  Irrigation and debridement of femur or hip  07/27/2018    Active  PGOLD2

## 2018-07-27 NOTE — PROGRESS NOTE ADULT - ASSESSMENT
69 yo female with dementia, history of UC, and s/p RT Hip hemiarthroplasty 6/22/18  Known post op hematoma  Here with low grade fever, rigors, increased confusion, found to have leukocytosis, 20% bands, severe sepsis- ICU, pressor use   Bld Cxs now 4 of 4 MRSA  No clues to primary source beyond R hip- despite rather "benign" appearance of incision, suspect emergence of secondary infection of hematoma/collection  No pneumonia, no UTI, no other wounds  Afebrile, WBC lower, renal Fx stable  L hip Fx noted    Plan:  Continue Vanco, check trough after 4th dose  No need for Cefepime  Repeat Bld Cxs in AM  Favor TTE  With high concern for infected R hip hematoma, plan is for IR aspiration ASAP- if confirms above, will need operative drainage/wash out  Hold any intervention for L hip until we've established control of MRSA  D/w family at bedside  D/w Dr Altman and SICU team  D/w Dr Rey  > 35 mins spent in direct assessment of the patient, critical care, analysis of all pertinent data, discussion, counseling, and coordination of care; benefit, possible adverse effects, and limitations of antibiotics reviewed- all represents complex medical decision making

## 2018-07-27 NOTE — PROGRESS NOTE ADULT - ATTENDING COMMENTS
Evaluated in AM round, continue to be evaluated  Met encephalopathy  Profound septic shock, pressure requirement increasing inspite of optimization of volume status  Point of care US IVC 2.5 cm  On abx Vanco for MRSA bacteremia, vanco trough  Spoke to Dr Rey, for drainage of hematoma at rt hip region with wash out today

## 2018-07-27 NOTE — CHART NOTE - NSCHARTNOTEFT_GEN_A_CORE
Spoke with Orthopedic resident Dr. Cash, there are no contraindications for emergent surgery. Plan for OR today.

## 2018-07-27 NOTE — CHART NOTE - NSCHARTNOTEFT_GEN_A_CORE
Called to evaluate patient at RRT for hypotension thought due to septic shock.  On review of the case over the phone, cause of septic shock was thought to be an infected hip collection from recent hip fixation (seen on Ct A/P) with trauma and ortho surgery following.  Plan is for OR today or tomorrow.  Recommended urgent SICU eval for more appropriate triage.  Patient now accepted by SICU and transferred there uneventfully.  Will defer all management to SICU.

## 2018-07-27 NOTE — PATIENT PROFILE ADULT. - NSTOBACCONEVERSMOKERY/N_GEN_A
Patient denies any complaints related to anticoagulation therapy at this time. Patient reports no change in medication, diet or health. Reinforced with patient to call clinic with any medication changes as this can impact INR. Reinforced signs and symptoms bleeding/clotting with patient.  Patient aware to seek medical care if signs and symptoms develop. Advised that if patient falls and/or hits their head, they should seek medical attention. Verbalizes understanding. Clinic number provided [phone]    Dosing instructions given to patient verbally over the phone. Advised to call the clinic with any questions or concerns. Patient verbalizes understanding. Clinic number provided.    Anticoagulation Summary as of 5/8/2017     INR goal 2.0-3.0   Today's INR 2.0   Maintenance plan 12.5 mg (5 mg x 2.5) on Tu; 10 mg (5 mg x 2) all other days   Weekly total 72.5 mg   Plan last modified Farzaneh Neely, RN (5/8/2017)   Next INR check 5/15/2017   Target end date Indefinite    Indications   Persistent atrial fibrillation [I48.1]         Anticoagulation Episode Summary     INR check location Outside Lab    Preferred lab ACL [place] OP DRAW AREA    Send INR reminders to ANTICOAG (OPEN ENROLLMENT) ACS CARD/EP    Comments Next STAC due 5/16/17;  ACL; 5 mg tablets 1/20/17 Per DR Ray steele for MAKENZIE series required.      Anticoagulation Care Providers     Provider Role Specialty Phone number    Gus Bell Referring Internal Medicine [phone]            
No

## 2018-07-27 NOTE — CHART NOTE - NSCHARTNOTEFT_GEN_A_CORE
Spoke with patient's  and decision maker (Caleb Colon - [phone]) at bedside. Mr Colon had expressed confusion regarding DNR and its implications. Had extended discussion with  regarding the differences.  expressed that if pt's heart were to stop beating or to stop breathing, he would like everything done, including CPR and intubation. He would not like his wife to be on life support for a long period of time if she were "brain dead". Explained to  that this meant that he would like patient to be full code.  expressed understanding and agreed. Conversation was had in the presence of patient's daughter and Dr. Garcia (PGY1).    DON Guevara PGY2  [id number] Spoke with patient's  and decision maker (Caleb Colon - [phone]) at bedside. Mr Colon had mentioned singing paperwork to establish DNR. In conversation with Mr colon it became clear that he had some confusion regarding DNR and its implications. Had extended discussion with  regarding the differences.  expressed that if pt's heart were to stop beating or to stop breathing, he would like everything done, including CPR and intubation. He would not like his wife to be on life support for a long period of time if she were "brain dead". Explained to  that this meant that he would like patient to be full code.  expressed understanding and agreed. Conversation was had in the presence of patient's daughter and Dr. Garcia (PGY1).    DON Guevara PGY2  [phone]

## 2018-07-28 ENCOUNTER — APPOINTMENT (OUTPATIENT)
Dept: ORTHOPEDIC SURGERY | Facility: HOSPITAL | Age: 68
End: 2018-07-28
Payer: MEDICARE

## 2018-07-28 DIAGNOSIS — A41.01 SEPSIS DUE TO METHICILLIN SUSCEPTIBLE STAPHYLOCOCCUS AUREUS: ICD-10-CM

## 2018-07-28 LAB
ALBUMIN SERPL ELPH-MCNC: 2.2 G/DL — LOW (ref 3.3–5)
ALP SERPL-CCNC: 165 U/L — HIGH (ref 40–120)
ALT FLD-CCNC: 12 U/L — SIGNIFICANT CHANGE UP (ref 10–45)
ANION GAP SERPL CALC-SCNC: 11 MMOL/L — SIGNIFICANT CHANGE UP (ref 5–17)
ANION GAP SERPL CALC-SCNC: 11 MMOL/L — SIGNIFICANT CHANGE UP (ref 5–17)
ANION GAP SERPL CALC-SCNC: 13 MMOL/L — SIGNIFICANT CHANGE UP (ref 5–17)
ANION GAP SERPL CALC-SCNC: 14 MMOL/L — SIGNIFICANT CHANGE UP (ref 5–17)
APPEARANCE UR: CLEAR — SIGNIFICANT CHANGE UP
APTT BLD: 37.9 SEC — HIGH (ref 27.5–37.4)
AST SERPL-CCNC: 16 U/L — SIGNIFICANT CHANGE UP (ref 10–40)
BACTERIA # UR AUTO: ABNORMAL /HPF
BASE EXCESS BLDV CALC-SCNC: -7.1 MMOL/L — LOW (ref -2–2)
BASE EXCESS BLDV CALC-SCNC: -7.2 MMOL/L — LOW (ref -2–2)
BASE EXCESS BLDV CALC-SCNC: -7.7 MMOL/L — LOW (ref -2–2)
BASE EXCESS BLDV CALC-SCNC: -8 MMOL/L — LOW (ref -2–2)
BILIRUB DIRECT SERPL-MCNC: 0.1 MG/DL — SIGNIFICANT CHANGE UP (ref 0–0.2)
BILIRUB INDIRECT FLD-MCNC: 0.2 MG/DL — SIGNIFICANT CHANGE UP (ref 0.2–1)
BILIRUB SERPL-MCNC: 0.3 MG/DL — SIGNIFICANT CHANGE UP (ref 0.2–1.2)
BILIRUB UR-MCNC: NEGATIVE — SIGNIFICANT CHANGE UP
BUN SERPL-MCNC: 26 MG/DL — HIGH (ref 7–23)
BUN SERPL-MCNC: 31 MG/DL — HIGH (ref 7–23)
CA-I SERPL-SCNC: 1.13 MMOL/L — SIGNIFICANT CHANGE UP (ref 1.12–1.3)
CA-I SERPL-SCNC: 1.14 MMOL/L — SIGNIFICANT CHANGE UP (ref 1.12–1.3)
CA-I SERPL-SCNC: 1.16 MMOL/L — SIGNIFICANT CHANGE UP (ref 1.12–1.3)
CA-I SERPL-SCNC: 1.16 MMOL/L — SIGNIFICANT CHANGE UP (ref 1.12–1.3)
CALCIUM SERPL-MCNC: 7.8 MG/DL — LOW (ref 8.4–10.5)
CALCIUM SERPL-MCNC: 7.9 MG/DL — LOW (ref 8.4–10.5)
CHLORIDE BLDV-SCNC: 107 MMOL/L — SIGNIFICANT CHANGE UP (ref 96–108)
CHLORIDE BLDV-SCNC: 108 MMOL/L — SIGNIFICANT CHANGE UP (ref 96–108)
CHLORIDE BLDV-SCNC: 109 MMOL/L — HIGH (ref 96–108)
CHLORIDE BLDV-SCNC: 109 MMOL/L — HIGH (ref 96–108)
CHLORIDE SERPL-SCNC: 102 MMOL/L — SIGNIFICANT CHANGE UP (ref 96–108)
CHLORIDE SERPL-SCNC: 102 MMOL/L — SIGNIFICANT CHANGE UP (ref 96–108)
CHLORIDE SERPL-SCNC: 104 MMOL/L — SIGNIFICANT CHANGE UP (ref 96–108)
CHLORIDE SERPL-SCNC: 105 MMOL/L — SIGNIFICANT CHANGE UP (ref 96–108)
CHLORIDE UR-SCNC: 79 MMOL/L — SIGNIFICANT CHANGE UP
CO2 BLDV-SCNC: 18 MMOL/L — LOW (ref 22–30)
CO2 BLDV-SCNC: 18 MMOL/L — LOW (ref 22–30)
CO2 BLDV-SCNC: 19 MMOL/L — LOW (ref 22–30)
CO2 BLDV-SCNC: 19 MMOL/L — LOW (ref 22–30)
CO2 SERPL-SCNC: 14 MMOL/L — LOW (ref 22–31)
CO2 SERPL-SCNC: 14 MMOL/L — LOW (ref 22–31)
CO2 SERPL-SCNC: 15 MMOL/L — LOW (ref 22–31)
CO2 SERPL-SCNC: 15 MMOL/L — LOW (ref 22–31)
COLOR SPEC: YELLOW — SIGNIFICANT CHANGE UP
CORTIS AM PEAK SERPL-MCNC: 10.6 UG/DL — SIGNIFICANT CHANGE UP (ref 6–18.4)
CREAT SERPL-MCNC: 0.7 MG/DL — SIGNIFICANT CHANGE UP (ref 0.5–1.3)
CREAT SERPL-MCNC: 0.73 MG/DL — SIGNIFICANT CHANGE UP (ref 0.5–1.3)
CREAT SERPL-MCNC: 0.79 MG/DL — SIGNIFICANT CHANGE UP (ref 0.5–1.3)
CREAT SERPL-MCNC: 0.8 MG/DL — SIGNIFICANT CHANGE UP (ref 0.5–1.3)
DIFF PNL FLD: ABNORMAL
EPI CELLS # UR: SIGNIFICANT CHANGE UP /HPF
GAS PNL BLDA: SIGNIFICANT CHANGE UP
GAS PNL BLDV: 127 MMOL/L — LOW (ref 136–145)
GAS PNL BLDV: 127 MMOL/L — LOW (ref 136–145)
GAS PNL BLDV: 128 MMOL/L — LOW (ref 136–145)
GAS PNL BLDV: 129 MMOL/L — LOW (ref 136–145)
GAS PNL BLDV: SIGNIFICANT CHANGE UP
GLUCOSE BLDV-MCNC: 122 MG/DL — HIGH (ref 70–99)
GLUCOSE BLDV-MCNC: 139 MG/DL — HIGH (ref 70–99)
GLUCOSE BLDV-MCNC: 158 MG/DL — HIGH (ref 70–99)
GLUCOSE BLDV-MCNC: 195 MG/DL — HIGH (ref 70–99)
GLUCOSE SERPL-MCNC: 135 MG/DL — HIGH (ref 70–99)
GLUCOSE SERPL-MCNC: 169 MG/DL — HIGH (ref 70–99)
GLUCOSE SERPL-MCNC: 293 MG/DL — HIGH (ref 70–99)
GLUCOSE SERPL-MCNC: 338 MG/DL — HIGH (ref 70–99)
GLUCOSE UR QL: 150 MG/DL
GRAM STN FLD: SIGNIFICANT CHANGE UP
HCO3 BLDV-SCNC: 17 MMOL/L — LOW (ref 21–29)
HCO3 BLDV-SCNC: 17 MMOL/L — LOW (ref 21–29)
HCO3 BLDV-SCNC: 18 MMOL/L — LOW (ref 21–29)
HCO3 BLDV-SCNC: 18 MMOL/L — LOW (ref 21–29)
HCT VFR BLD CALC: 23 % — LOW (ref 34.5–45)
HCT VFR BLD CALC: 23.3 % — LOW (ref 34.5–45)
HCT VFR BLD CALC: 24.4 % — LOW (ref 34.5–45)
HCT VFR BLD CALC: 25.2 % — LOW (ref 34.5–45)
HCT VFR BLDA CALC: 24 % — LOW (ref 39–50)
HCT VFR BLDA CALC: 24 % — LOW (ref 39–50)
HCT VFR BLDA CALC: 25 % — LOW (ref 39–50)
HCT VFR BLDA CALC: 29 % — LOW (ref 39–50)
HGB BLD CALC-MCNC: 7.8 G/DL — LOW (ref 11.5–15.5)
HGB BLD CALC-MCNC: 7.9 G/DL — LOW (ref 11.5–15.5)
HGB BLD CALC-MCNC: 8 G/DL — LOW (ref 11.5–15.5)
HGB BLD CALC-MCNC: 9.3 G/DL — LOW (ref 11.5–15.5)
HGB BLD-MCNC: 7.4 G/DL — LOW (ref 11.5–15.5)
HGB BLD-MCNC: 7.5 G/DL — LOW (ref 11.5–15.5)
HGB BLD-MCNC: 8.1 G/DL — LOW (ref 11.5–15.5)
HGB BLD-MCNC: 8.1 G/DL — LOW (ref 11.5–15.5)
HOROWITZ INDEX BLDV+IHG-RTO: 40 — SIGNIFICANT CHANGE UP
HYALINE CASTS # UR AUTO: ABNORMAL
INR BLD: 1.28 RATIO — HIGH (ref 0.88–1.16)
KETONES UR-MCNC: NEGATIVE — SIGNIFICANT CHANGE UP
LACTATE BLDV-MCNC: 1.7 MMOL/L — SIGNIFICANT CHANGE UP (ref 0.7–2)
LACTATE BLDV-MCNC: 2.7 MMOL/L — HIGH (ref 0.7–2)
LEUKOCYTE ESTERASE UR-ACNC: NEGATIVE — SIGNIFICANT CHANGE UP
MAGNESIUM SERPL-MCNC: 2.2 MG/DL — SIGNIFICANT CHANGE UP (ref 1.6–2.6)
MAGNESIUM SERPL-MCNC: 2.3 MG/DL — SIGNIFICANT CHANGE UP (ref 1.6–2.6)
MCHC RBC-ENTMCNC: 27.9 PG — SIGNIFICANT CHANGE UP (ref 27–34)
MCHC RBC-ENTMCNC: 28.3 PG — SIGNIFICANT CHANGE UP (ref 27–34)
MCHC RBC-ENTMCNC: 28.6 PG — SIGNIFICANT CHANGE UP (ref 27–34)
MCHC RBC-ENTMCNC: 28.9 PG — SIGNIFICANT CHANGE UP (ref 27–34)
MCHC RBC-ENTMCNC: 31.7 GM/DL — LOW (ref 32–36)
MCHC RBC-ENTMCNC: 32 GM/DL — SIGNIFICANT CHANGE UP (ref 32–36)
MCHC RBC-ENTMCNC: 32.7 GM/DL — SIGNIFICANT CHANGE UP (ref 32–36)
MCHC RBC-ENTMCNC: 33.1 GM/DL — SIGNIFICANT CHANGE UP (ref 32–36)
MCV RBC AUTO: 87.4 FL — SIGNIFICANT CHANGE UP (ref 80–100)
MCV RBC AUTO: 87.5 FL — SIGNIFICANT CHANGE UP (ref 80–100)
MCV RBC AUTO: 88 FL — SIGNIFICANT CHANGE UP (ref 80–100)
MCV RBC AUTO: 88.3 FL — SIGNIFICANT CHANGE UP (ref 80–100)
NIGHT BLUE STAIN TISS: SIGNIFICANT CHANGE UP
NITRITE UR-MCNC: NEGATIVE — SIGNIFICANT CHANGE UP
OTHER CELLS CSF MANUAL: 5 ML/DL — LOW (ref 18–22)
OTHER CELLS CSF MANUAL: 6 ML/DL — LOW (ref 18–22)
PCO2 BLDV: 34 MMHG — LOW (ref 35–50)
PCO2 BLDV: 35 MMHG — SIGNIFICANT CHANGE UP (ref 35–50)
PCO2 BLDV: 36 MMHG — SIGNIFICANT CHANGE UP (ref 35–50)
PCO2 BLDV: 36 MMHG — SIGNIFICANT CHANGE UP (ref 35–50)
PH BLDV: 7.3 — LOW (ref 7.35–7.45)
PH BLDV: 7.32 — LOW (ref 7.35–7.45)
PH BLDV: 7.32 — LOW (ref 7.35–7.45)
PH BLDV: 7.33 — LOW (ref 7.35–7.45)
PH UR: 6 — SIGNIFICANT CHANGE UP (ref 5–8)
PHOSPHATE SERPL-MCNC: 3.2 MG/DL — SIGNIFICANT CHANGE UP (ref 2.5–4.5)
PHOSPHATE SERPL-MCNC: 3.3 MG/DL — SIGNIFICANT CHANGE UP (ref 2.5–4.5)
PHOSPHATE SERPL-MCNC: 3.4 MG/DL — SIGNIFICANT CHANGE UP (ref 2.5–4.5)
PHOSPHATE SERPL-MCNC: 3.9 MG/DL — SIGNIFICANT CHANGE UP (ref 2.5–4.5)
PLATELET # BLD AUTO: 171 K/UL — SIGNIFICANT CHANGE UP (ref 150–400)
PLATELET # BLD AUTO: 214 K/UL — SIGNIFICANT CHANGE UP (ref 150–400)
PLATELET # BLD AUTO: 231 K/UL — SIGNIFICANT CHANGE UP (ref 150–400)
PLATELET # BLD AUTO: 259 K/UL — SIGNIFICANT CHANGE UP (ref 150–400)
PO2 BLDV: 26 MMHG — SIGNIFICANT CHANGE UP (ref 25–45)
PO2 BLDV: 30 MMHG — SIGNIFICANT CHANGE UP (ref 25–45)
PO2 BLDV: 30 MMHG — SIGNIFICANT CHANGE UP (ref 25–45)
PO2 BLDV: 31 MMHG — SIGNIFICANT CHANGE UP (ref 25–45)
POTASSIUM BLDV-SCNC: 4.5 MMOL/L — SIGNIFICANT CHANGE UP (ref 3.5–5.3)
POTASSIUM BLDV-SCNC: 4.9 MMOL/L — SIGNIFICANT CHANGE UP (ref 3.5–5.3)
POTASSIUM SERPL-MCNC: 4.9 MMOL/L — SIGNIFICANT CHANGE UP (ref 3.5–5.3)
POTASSIUM SERPL-MCNC: 5 MMOL/L — SIGNIFICANT CHANGE UP (ref 3.5–5.3)
POTASSIUM SERPL-MCNC: 5.3 MMOL/L — SIGNIFICANT CHANGE UP (ref 3.5–5.3)
POTASSIUM SERPL-MCNC: 5.4 MMOL/L — HIGH (ref 3.5–5.3)
POTASSIUM SERPL-SCNC: 4.9 MMOL/L — SIGNIFICANT CHANGE UP (ref 3.5–5.3)
POTASSIUM SERPL-SCNC: 5 MMOL/L — SIGNIFICANT CHANGE UP (ref 3.5–5.3)
POTASSIUM SERPL-SCNC: 5.3 MMOL/L — SIGNIFICANT CHANGE UP (ref 3.5–5.3)
POTASSIUM SERPL-SCNC: 5.4 MMOL/L — HIGH (ref 3.5–5.3)
POTASSIUM UR-SCNC: 68 MMOL/L — SIGNIFICANT CHANGE UP
PROCALCITONIN SERPL-MCNC: 20.51 NG/ML — HIGH (ref 0.02–0.1)
PROT SERPL-MCNC: 4.5 G/DL — LOW (ref 6–8.3)
PROT UR-MCNC: 30 MG/DL
PROTHROM AB SERPL-ACNC: 13.9 SEC — HIGH (ref 9.8–12.7)
RBC # BLD: 2.64 M/UL — LOW (ref 3.8–5.2)
RBC # BLD: 2.65 M/UL — LOW (ref 3.8–5.2)
RBC # BLD: 2.79 M/UL — LOW (ref 3.8–5.2)
RBC # BLD: 2.85 M/UL — LOW (ref 3.8–5.2)
RBC # FLD: 18 % — HIGH (ref 10.3–14.5)
RBC # FLD: 18 % — HIGH (ref 10.3–14.5)
RBC # FLD: 18.8 % — HIGH (ref 10.3–14.5)
RBC # FLD: 18.9 % — HIGH (ref 10.3–14.5)
RBC CASTS # UR COMP ASSIST: SIGNIFICANT CHANGE UP /HPF (ref 0–2)
SAO2 % BLDV: 44 % — LOW (ref 67–88)
SAO2 % BLDV: 44 % — LOW (ref 67–88)
SAO2 % BLDV: 47 % — LOW (ref 67–88)
SAO2 % BLDV: 48 % — LOW (ref 67–88)
SODIUM SERPL-SCNC: 128 MMOL/L — LOW (ref 135–145)
SODIUM SERPL-SCNC: 130 MMOL/L — LOW (ref 135–145)
SODIUM SERPL-SCNC: 130 MMOL/L — LOW (ref 135–145)
SODIUM SERPL-SCNC: 132 MMOL/L — LOW (ref 135–145)
SODIUM UR-SCNC: 23 MMOL/L — SIGNIFICANT CHANGE UP
SP GR SPEC: 1.02 — SIGNIFICANT CHANGE UP (ref 1.01–1.02)
SPECIMEN SOURCE: SIGNIFICANT CHANGE UP
T3 SERPL-MCNC: 44 NG/DL — LOW (ref 80–200)
T4 AB SER-ACNC: 4.5 UG/DL — LOW (ref 4.6–12)
TROPONIN T, HIGH SENSITIVITY RESULT: 41 NG/L — SIGNIFICANT CHANGE UP (ref 0–51)
TSH SERPL-MCNC: 0.95 UIU/ML — SIGNIFICANT CHANGE UP (ref 0.27–4.2)
UROBILINOGEN FLD QL: NEGATIVE — SIGNIFICANT CHANGE UP
VANCOMYCIN TROUGH SERPL-MCNC: 18.8 UG/ML — SIGNIFICANT CHANGE UP (ref 10–20)
WBC # BLD: 11.3 K/UL — HIGH (ref 3.8–10.5)
WBC # BLD: 12.7 K/UL — HIGH (ref 3.8–10.5)
WBC # BLD: 13.1 K/UL — HIGH (ref 3.8–10.5)
WBC # BLD: 15.5 K/UL — HIGH (ref 3.8–10.5)
WBC # FLD AUTO: 11.3 K/UL — HIGH (ref 3.8–10.5)
WBC # FLD AUTO: 12.7 K/UL — HIGH (ref 3.8–10.5)
WBC # FLD AUTO: 13.1 K/UL — HIGH (ref 3.8–10.5)
WBC # FLD AUTO: 15.5 K/UL — HIGH (ref 3.8–10.5)
WBC UR QL: SIGNIFICANT CHANGE UP /HPF (ref 0–5)

## 2018-07-28 PROCEDURE — 71045 X-RAY EXAM CHEST 1 VIEW: CPT | Mod: 26

## 2018-07-28 PROCEDURE — 99292 CRITICAL CARE ADDL 30 MIN: CPT

## 2018-07-28 PROCEDURE — 99232 SBSQ HOSP IP/OBS MODERATE 35: CPT | Mod: GC

## 2018-07-28 PROCEDURE — 99233 SBSQ HOSP IP/OBS HIGH 50: CPT

## 2018-07-28 PROCEDURE — ZZZZZ: CPT

## 2018-07-28 PROCEDURE — 99291 CRITICAL CARE FIRST HOUR: CPT

## 2018-07-28 RX ORDER — HYDROMORPHONE HYDROCHLORIDE 2 MG/ML
1 INJECTION INTRAMUSCULAR; INTRAVENOUS; SUBCUTANEOUS ONCE
Qty: 0 | Refills: 0 | Status: DISCONTINUED | OUTPATIENT
Start: 2018-07-28 | End: 2018-07-28

## 2018-07-28 RX ORDER — CEFEPIME 1 G/1
1000 INJECTION, POWDER, FOR SOLUTION INTRAMUSCULAR; INTRAVENOUS EVERY 12 HOURS
Qty: 0 | Refills: 0 | Status: DISCONTINUED | OUTPATIENT
Start: 2018-07-28 | End: 2018-07-29

## 2018-07-28 RX ORDER — SODIUM CHLORIDE 9 MG/ML
1000 INJECTION INTRAMUSCULAR; INTRAVENOUS; SUBCUTANEOUS
Qty: 0 | Refills: 0 | Status: DISCONTINUED | OUTPATIENT
Start: 2018-07-28 | End: 2018-07-30

## 2018-07-28 RX ORDER — INSULIN LISPRO 100/ML
VIAL (ML) SUBCUTANEOUS EVERY 4 HOURS
Qty: 0 | Refills: 0 | Status: DISCONTINUED | OUTPATIENT
Start: 2018-07-28 | End: 2018-07-30

## 2018-07-28 RX ORDER — CEFEPIME 1 G/1
1000 INJECTION, POWDER, FOR SOLUTION INTRAMUSCULAR; INTRAVENOUS EVERY 24 HOURS
Qty: 0 | Refills: 0 | Status: DISCONTINUED | OUTPATIENT
Start: 2018-07-28 | End: 2018-07-28

## 2018-07-28 RX ORDER — ALBUMIN HUMAN 25 %
250 VIAL (ML) INTRAVENOUS ONCE
Qty: 0 | Refills: 0 | Status: COMPLETED | OUTPATIENT
Start: 2018-07-28 | End: 2018-07-28

## 2018-07-28 RX ORDER — DOPAMINE HYDROCHLORIDE 40 MG/ML
5 INJECTION, SOLUTION, CONCENTRATE INTRAVENOUS
Qty: 400 | Refills: 0 | Status: DISCONTINUED | OUTPATIENT
Start: 2018-07-28 | End: 2018-07-30

## 2018-07-28 RX ADMIN — DEXMEDETOMIDINE HYDROCHLORIDE IN 0.9% SODIUM CHLORIDE 3.78 MICROGRAM(S)/KG/HR: 4 INJECTION INTRAVENOUS at 13:26

## 2018-07-28 RX ADMIN — ENOXAPARIN SODIUM 40 MILLIGRAM(S): 100 INJECTION SUBCUTANEOUS at 05:34

## 2018-07-28 RX ADMIN — Medication 0.5 MILLIGRAM(S): at 17:14

## 2018-07-28 RX ADMIN — Medication 3: at 06:38

## 2018-07-28 RX ADMIN — DEXMEDETOMIDINE HYDROCHLORIDE IN 0.9% SODIUM CHLORIDE 3.78 MICROGRAM(S)/KG/HR: 4 INJECTION INTRAVENOUS at 16:22

## 2018-07-28 RX ADMIN — HYDROMORPHONE HYDROCHLORIDE 0.5 MILLIGRAM(S): 2 INJECTION INTRAMUSCULAR; INTRAVENOUS; SUBCUTANEOUS at 06:03

## 2018-07-28 RX ADMIN — Medication 1: at 22:26

## 2018-07-28 RX ADMIN — HYDROMORPHONE HYDROCHLORIDE 0.5 MILLIGRAM(S): 2 INJECTION INTRAMUSCULAR; INTRAVENOUS; SUBCUTANEOUS at 23:06

## 2018-07-28 RX ADMIN — CEFEPIME 100 MILLIGRAM(S): 1 INJECTION, POWDER, FOR SOLUTION INTRAMUSCULAR; INTRAVENOUS at 05:00

## 2018-07-28 RX ADMIN — VASOPRESSIN 1.8 UNIT(S)/MIN: 20 INJECTION INTRAVENOUS at 20:01

## 2018-07-28 RX ADMIN — Medication 50 MILLIGRAM(S): at 00:38

## 2018-07-28 RX ADMIN — VASOPRESSIN 1.8 UNIT(S)/MIN: 20 INJECTION INTRAVENOUS at 16:21

## 2018-07-28 RX ADMIN — HYDROMORPHONE HYDROCHLORIDE 0.5 MILLIGRAM(S): 2 INJECTION INTRAMUSCULAR; INTRAVENOUS; SUBCUTANEOUS at 06:18

## 2018-07-28 RX ADMIN — Medication 125 MILLILITER(S): at 16:15

## 2018-07-28 RX ADMIN — Medication 250 MILLIGRAM(S): at 22:26

## 2018-07-28 RX ADMIN — HYDROMORPHONE HYDROCHLORIDE 0.5 MILLIGRAM(S): 2 INJECTION INTRAMUSCULAR; INTRAVENOUS; SUBCUTANEOUS at 20:05

## 2018-07-28 RX ADMIN — Medication 0.5 MILLIGRAM(S): at 05:42

## 2018-07-28 RX ADMIN — HYDROMORPHONE HYDROCHLORIDE 0.5 MILLIGRAM(S): 2 INJECTION INTRAMUSCULAR; INTRAVENOUS; SUBCUTANEOUS at 20:20

## 2018-07-28 RX ADMIN — Medication 4.72 MICROGRAM(S)/KG/MIN: at 13:06

## 2018-07-28 RX ADMIN — HYDROMORPHONE HYDROCHLORIDE 1 MILLIGRAM(S): 2 INJECTION INTRAMUSCULAR; INTRAVENOUS; SUBCUTANEOUS at 11:35

## 2018-07-28 RX ADMIN — DEXMEDETOMIDINE HYDROCHLORIDE IN 0.9% SODIUM CHLORIDE 3.78 MICROGRAM(S)/KG/HR: 4 INJECTION INTRAVENOUS at 20:01

## 2018-07-28 RX ADMIN — CHLORHEXIDINE GLUCONATE 15 MILLILITER(S): 213 SOLUTION TOPICAL at 13:06

## 2018-07-28 RX ADMIN — Medication 50 MILLIGRAM(S): at 23:21

## 2018-07-28 RX ADMIN — HYDROMORPHONE HYDROCHLORIDE 1 MILLIGRAM(S): 2 INJECTION INTRAMUSCULAR; INTRAVENOUS; SUBCUTANEOUS at 02:30

## 2018-07-28 RX ADMIN — SODIUM CHLORIDE 100 MILLILITER(S): 9 INJECTION, SOLUTION INTRAVENOUS at 03:46

## 2018-07-28 RX ADMIN — HYDROMORPHONE HYDROCHLORIDE 0.5 MILLIGRAM(S): 2 INJECTION INTRAMUSCULAR; INTRAVENOUS; SUBCUTANEOUS at 16:20

## 2018-07-28 RX ADMIN — DEXMEDETOMIDINE HYDROCHLORIDE IN 0.9% SODIUM CHLORIDE 3.78 MICROGRAM(S)/KG/HR: 4 INJECTION INTRAVENOUS at 03:46

## 2018-07-28 RX ADMIN — CHLORHEXIDINE GLUCONATE 15 MILLILITER(S): 213 SOLUTION TOPICAL at 05:03

## 2018-07-28 RX ADMIN — VASOPRESSIN 1.8 UNIT(S)/MIN: 20 INJECTION INTRAVENOUS at 03:47

## 2018-07-28 RX ADMIN — Medication 4.72 MICROGRAM(S)/KG/MIN: at 03:47

## 2018-07-28 RX ADMIN — SODIUM CHLORIDE 100 MILLILITER(S): 9 INJECTION INTRAMUSCULAR; INTRAVENOUS; SUBCUTANEOUS at 20:02

## 2018-07-28 RX ADMIN — HYDROMORPHONE HYDROCHLORIDE 0.5 MILLIGRAM(S): 2 INJECTION INTRAMUSCULAR; INTRAVENOUS; SUBCUTANEOUS at 23:26

## 2018-07-28 RX ADMIN — Medication 50 MILLIGRAM(S): at 13:06

## 2018-07-28 RX ADMIN — Medication 4.72 MICROGRAM(S)/KG/MIN: at 20:01

## 2018-07-28 RX ADMIN — CHLORHEXIDINE GLUCONATE 1 APPLICATION(S): 213 SOLUTION TOPICAL at 05:03

## 2018-07-28 RX ADMIN — Medication 250 MILLIGRAM(S): at 10:03

## 2018-07-28 RX ADMIN — HYDROMORPHONE HYDROCHLORIDE 0.5 MILLIGRAM(S): 2 INJECTION INTRAMUSCULAR; INTRAVENOUS; SUBCUTANEOUS at 16:40

## 2018-07-28 RX ADMIN — Medication 1: at 10:08

## 2018-07-28 RX ADMIN — Medication 50 MILLIGRAM(S): at 05:03

## 2018-07-28 RX ADMIN — CHLORHEXIDINE GLUCONATE 15 MILLILITER(S): 213 SOLUTION TOPICAL at 22:25

## 2018-07-28 RX ADMIN — Medication 2: at 03:46

## 2018-07-28 RX ADMIN — Medication 1000 MILLILITER(S): at 23:05

## 2018-07-28 RX ADMIN — CEFEPIME 100 MILLIGRAM(S): 1 INJECTION, POWDER, FOR SOLUTION INTRAMUSCULAR; INTRAVENOUS at 18:57

## 2018-07-28 RX ADMIN — DOPAMINE HYDROCHLORIDE 9.45 MICROGRAM(S)/KG/MIN: 40 INJECTION, SOLUTION, CONCENTRATE INTRAVENOUS at 23:06

## 2018-07-28 RX ADMIN — HYDROMORPHONE HYDROCHLORIDE 1 MILLIGRAM(S): 2 INJECTION INTRAMUSCULAR; INTRAVENOUS; SUBCUTANEOUS at 02:15

## 2018-07-28 RX ADMIN — PANTOPRAZOLE SODIUM 40 MILLIGRAM(S): 20 TABLET, DELAYED RELEASE ORAL at 13:06

## 2018-07-28 RX ADMIN — Medication 50 MILLIGRAM(S): at 18:50

## 2018-07-28 RX ADMIN — HYDROMORPHONE HYDROCHLORIDE 1 MILLIGRAM(S): 2 INJECTION INTRAMUSCULAR; INTRAVENOUS; SUBCUTANEOUS at 11:20

## 2018-07-28 NOTE — DIETITIAN INITIAL EVALUATION ADULT. - PROBLEM SELECTOR PLAN 1
Pt has new onset of LLE weakness and inability to walk and CT shows acute left pelvic fracture with L2 compression fracture.   Discussed with ortho, pt should have fixation sooner rather than later however is not stable for OR tonight.  She is actively septic with bandemia and no clear source.  She also has significant troponemia and hypoglycemia which would put her at high risk for adverse events at this current time.  Spoke with PMD who agrees. He will tere cardiology for further assessment and reasses her stability tomorrow.  Ortho following and aware.   Rectal tone in tact on exam but significant LE weakness and per  this is new onset. She has also significant back pain that is new in her lumbar spine likely from the L2 compression fracture.  Cannot rule out abscess so will get MRI Lumbar spine

## 2018-07-28 NOTE — DIETITIAN INITIAL EVALUATION ADULT. - NS AS NUTRI INTERV COLLABORAT
monitor for signs of malnutrition. RD remains available to monitor EN tolerance, Wt, labs and to collected further subjective information/Collaboration with other providers

## 2018-07-28 NOTE — DIETITIAN INITIAL EVALUATION ADULT. - PROBLEM SELECTOR PLAN 4
Pt has Alzheimers at baseline but is more confused than normal likely 2/2 underlying sepsis vs. hypoglycemia.  CT head showed no acute new infarction.   Stat repeat FS is wnl and now placed on Q4hr FS.  Pt takes no antihyperglycemics at home so unclear etiology of hypoglycemia except she has not eaten since yesterday.  If persistently low would start Dextrose 5% @ 30mls/hr.    #Hyponatremia  Pt has decreased PO intake and looks euvolumic on exam after IVF recussitation.  Would check Serum OSM and hold off on more fluid until 10pm BMP check.  Goal for tomorrow will be 132 by 12pm.  If 10pm draw shows NA < 125 would fluid restrict and if > 132 would give D5 @150mls/hr.  Check BMP in am.

## 2018-07-28 NOTE — DIETITIAN INITIAL EVALUATION ADULT. - PROBLEM SELECTOR PLAN 10
Pt takes Prednisone 10mg PO daily, chronically so is immunosuppressed also likely has some component of adrenal insufficiency and this should be considered in acute situations if she becomes septic or has surgery  Continue Advair daily with rinsing with water to prevent thrush      FEN:  Regular diet  Keep Mg > 2 k >4    Discussed with Dr. Kimi mcclain, pt, family and NP ED

## 2018-07-28 NOTE — DIETITIAN INITIAL EVALUATION ADULT. - NS FNS REASON FOR WEIGHT CHANG
other (specify)/Pt weight history is unclear at this time. In March 2018 Pt was 78lbs and had been loosing weight in the setting of a poor PO intake. Pt is now 111.1lbs.  Weight gain, vs, fluids vs accuracy of weights obtained.

## 2018-07-28 NOTE — PROGRESS NOTE ADULT - SUBJECTIVE AND OBJECTIVE BOX
[facility] DIVISION OF KIDNEY DISEASES AND HYPERTENSION -- FOLLOW UP NOTE  --------------------------------------------------------------------------------  Chief Complaint: hyponatremia      24 hour events/subjective:    -Pt hypoglycemic to 50's.  - S/p OR emergently for a right hip I&D with revision of her hemiarthroplasty with copious amounts of pus evacuation   Patient now intubated on a vasopressin gtt and norepinephrine gtt  -Serum sodium ~130 this AM          PAST HISTORY  --------------------------------------------------------------------------------  No significant changes to PMH, PSH, FHx, SHx, unless otherwise noted    ALLERGIES & MEDICATIONS  --------------------------------------------------------------------------------  Allergies    ASA; dye contrast (Anaphylaxis)  aspirin (Short breath)  divalproex sodium (Other (Unknown))  Haldol (Other (Unknown))  penicillin (Short breath; Rash)  sulfa drugs (Short breath; Rash)  Xanax (Other (Unknown))    Intolerances      Standing Inpatient Medications  buDESOnide   0.5 milliGRAM(s) Respule 0.5 milliGRAM(s) Inhalation every 12 hours  cefepime   IVPB 1000 milliGRAM(s) IV Intermittent every 24 hours  chlorhexidine 0.12% Liquid 15 milliLiter(s) Swish and Spit <User Schedule>  chlorhexidine 4% Liquid 1 Application(s) Topical <User Schedule>  dexmedetomidine Infusion 0.3 MICROgram(s)/kG/Hr IV Continuous <Continuous>  dextrose 5% + sodium chloride 0.9%. 1000 milliLiter(s) IV Continuous <Continuous>  enoxaparin Injectable 40 milliGRAM(s) SubCutaneous every 24 hours  fentaNYL   Infusion 0.5 MICROgram(s)/kG/Hr IV Continuous <Continuous>  hydrocortisone sodium succinate Injectable 50 milliGRAM(s) IV Push every 6 hours  insulin lispro (HumaLOG) corrective regimen sliding scale   SubCutaneous every 4 hours  norepinephrine Infusion 0.05 MICROgram(s)/kG/Min IV Continuous <Continuous>  pantoprazole  Injectable 40 milliGRAM(s) IV Push daily  vancomycin  IVPB 750 milliGRAM(s) IV Intermittent every 12 hours  vasopressin Infusion 0.03 Unit(s)/Min IV Continuous <Continuous>    PRN Inpatient Medications  HYDROmorphone  Injectable 0.5 milliGRAM(s) IV Push every 3 hours PRN      REVIEW OF SYSTEMS  --------------------------------------------------------------------------------  unable to obtain    VITALS/PHYSICAL EXAM  --------------------------------------------------------------------------------  T(C): 37.2 (07-28-18 @ 03:00), Max: 37.2 (07-28-18 @ 03:00)  HR: 80 (07-28-18 @ 08:04) (70 - 122)  BP: 76/51 (07-27-18 @ 20:15) (76/51 - 127/58)  RR: 16 (07-28-18 @ 06:45) (12 - 48)  SpO2: 92% (07-28-18 @ 08:04) (72% - 100%)  Wt(kg): --  Height (cm): 147.32 (07-27-18 @ 02:10)  Weight (kg): 50.4 (07-27-18 @ 02:10)  BMI (kg/m2): 23.2 (07-27-18 @ 02:10)  BSA (m2): 1.42 (07-27-18 @ 02:10)      07-27-18 @ 07:01  -  07-28-18 @ 07:00  --------------------------------------------------------  IN: 3925.2 mL / OUT: 1160 mL / NET: 2765.2 mL      Physical Exam:             Gen: Elderly woman  	HEENT: ETT to vent  	Pulm: vent sounds  	CV: RRR, S1S2; no rub  	Abd: +BS, soft, nontender/nondistended  	: Hiral noted.   	UE: Warm, no edema  	LE: Warm, no edema  	Neuro; sedated  	Skin: Warm,    	LABS/STUDIES  --------------------------------------------------------------------------------              7.4    15.5  >-----------<  259      [07-28-18 @ 05:54]              23.3     130  |  102  |  26  ----------------------------<  293      [07-28-18 @ 05:54]  5.0   |  14  |  0.73        Ca     7.9     [07-28-18 @ 05:54]      Mg     2.2     [07-28-18 @ 05:54]      Phos  3.3     [07-28-18 @ 05:54]    TPro  4.5  /  Alb  2.2  /  TBili  0.3  /  DBili  0.1  /  AST  16  /  ALT  12  /  AlkPhos  165  [07-28-18 @ 01:01]    PT/INR: PT 13.9 , INR 1.28       [07-28-18 @ 01:01]  PTT: 37.9       [07-28-18 @ 01:01]    CK 65      [07-27-18 @ 05:32]    Creatinine Trend:  SCr 0.73 [07-28 @ 05:54]  SCr 0.79 [07-28 @ 01:01]  SCr 0.82 [07-27 @ 18:49]  SCr 0.91 [07-27 @ 14:27]  SCr 0.91 [07-27 @ 13:30]    Urinalysis - [07-27-18 @ 04:56]      Color Yellow / Appearance Clear / SG 1.019 / pH 6.0      Gluc Negative / Ketone Negative  / Bili Negative / Urobili Negative       Blood Negative / Protein Trace / Leuk Est Large / Nitrite Negative      RBC 0-2 / WBC 2-5 / Hyaline  / Gran  / Sq Epi  / Non Sq Epi  / Bacteria     Urine Creatinine 81      [07-27-18 @ 04:56]  Urine Sodium 27      [07-27-18 @ 04:56]  Urine Osmolality 298      [07-27-18 @ 08:12]    HbA1c 5.5      [07-27-18 @ 09:02]  TSH 0.95      [07-27-18 @ 09:02]

## 2018-07-28 NOTE — DIETITIAN INITIAL EVALUATION ADULT. - OTHER INFO
Pt seen for LOS. Pt seen, remains intubated and sedated. Pt seen for LOS. Pt seen, remains intubated and sedated. No family at bedside, limited information collected at this time. No GI distress, micronutrient supplements. NKFA

## 2018-07-28 NOTE — PROGRESS NOTE ADULT - ATTENDING COMMENTS
Hyponatremia: likely multifactorial  Na improved  Would maintain on NS  If possible give Vancomycin in NS and not D5W  Metabolic acidosis: persists  Check U/A, Ur Na, UrK, Ur chloride

## 2018-07-28 NOTE — PROGRESS NOTE ADULT - ATTENDING COMMENTS
Precedex drip gtt, prn Dilaudid  Acute resp failure post op, vent adj for resp acidosis  CXR not in gross fluid overload  Resolving septic shock, Levo requirement decreasing  Abx Vanco, vanco trough, wound culture grows coccobacilli, add cefepime  Euvolumic, continue IVF  Low SvO2, HCT 23, will benefit from transfusion   kept updated

## 2018-07-28 NOTE — PROGRESS NOTE ADULT - ASSESSMENT
ASSESSMENT:  68 year old female presenting with symptomatic hypoglycemia and septic shock secondary to an infected right hip s/p right hip I&D with hemiarthroplasty revision.    PLAN:    Neuro: acute post-op pain, intubated, anxiety, depression, dementia  - Monitor mental status.  - Sedation with Precedex while intubated. Analgesic sedation with fentanyl while intubated.  - Dilaudid PRN breakthrough pain.  - Holding home Zoloft, Seroquel, Remeron, and melatonin.    Resp: acute post-op insufficiency, asthma  - Monitor pulse oximeter.  - Mechanical ventilation for acute post-op insufficiency. SBT with goal to extubate this AM.  - Pulmicort and Duoneb for asthma.    CV: septic shock  - Monitor vital signs.  - Wean norepinephrine and vasopressin gtt as tolerated with goal MAP > 65.    GI: no acute issues  - NPO. Can have regular diet when extubated.  - Protonix for GERD.    Renal: CKD stage 2  - Monitor I&Os.  - Monitor electrolytes and replete as necessary.  - D5NS at 100 mL/hr while being actively resuscitated, hypoglycemic, and NPO.    Heme: no acute issues  - Lovenox for VTE prophylaxis.    ID: MRSA bacteremia, infected right hip s/p I&D  - Monitor WBC, temperature, and procalcitonin.  - Follow up OR and repeat blood cultures. Reculture as clinically indicated.  - Vancomycin and cefepime for empiric antibiotics. Will narrow down coverage when OR cultures result.    Endo: hypoglycemia  - Stress dose steroids for septic shock as patient is chronically on prednisone for her asthma.  - Monitor fingersticks q4hrs for hypoglycemia.    Misc: infected right hip s/p I&D  - WBAT RLE but NWB LLE.    Disposition:  - Full code.  - Will remain in SICU for respiratory and hemodynamic monitoring.    Cynthia Wilkins PA-C    [id number]

## 2018-07-28 NOTE — PROGRESS NOTE ADULT - ATTENDING COMMENTS
will gently continue to resuscitate by giving 250 cc 5% albumin  will replace the low dose vasopressin with low dose dopamine   continue to monitor electrolytes    45 minutes of critical care management

## 2018-07-28 NOTE — DIETITIAN INITIAL EVALUATION ADULT. - ENERGY NEEDS
Ht: 4'10", Wt: 111.1lbs, BMI: 23.1kg/m2, IBW: 96lbs(+/-10%), 115%IBW  Pertinent information  Edema Skin: Ht: 4'10", Wt: 111.1lbs, BMI: 23.1kg/m2, IBW: 96lbs(+/-10%), 115%IBW  Pertinent information: Pt admitted for rigors, confusion and back pain. Per chart. Pt with recent hip Fx,. Per chart, RRT for hypotension, RRT for change in mental status. Pt with pelvic Fx and toxic metabolic encephalopathy.  POD #1 for exchange of femoral head, irrigation and debridement of femor.   +1 generalized Edema, Skin right/left heal DTI.

## 2018-07-28 NOTE — PROGRESS NOTE ADULT - SUBJECTIVE AND OBJECTIVE BOX
Patient seen and examined. Limited exam; Intubated/sedated and on pressors.    Physical exam  VS: see EMR  Gen: NAD  Right LE: Dressing clean, dry, and intact. Cannot perform neurovascular exam. Capillary refill brisk. Compartments soft and nontender.      A/P: 68y Female s/p R Irrigation and debridement of hip & exchange of femoral head component    -Pain Control  -DVT PPx  -PT Eval when appropriate-WBAT RLE, flat foot weight bearing LLE  -Continue current tx; Appreciate SICU care  -Would consider 1-2U PRBC

## 2018-07-28 NOTE — PROGRESS NOTE ADULT - SUBJECTIVE AND OBJECTIVE BOX
S/P drainage of hip abscess    Patient remains intubated with post surgical respiratory failure  on physiologic vasopressin for hypotension  the concern now is that the patient is oliguric and anuric for one hour.  In addition the patient has a metabolic acidoses demonstrated by a low bicarb  Had received blood transfusion for low hematocrit with a good response    The patient has a flow track that shows variability in the single digits and CVP in the low teens.      bedside critical care echocardiography shows good ventricular filling but hypodynamic and the ventricular wall motion is suboptimal

## 2018-07-28 NOTE — PROGRESS NOTE ADULT - SUBJECTIVE AND OBJECTIVE BOX
---___---___---___---___---___---___ ---___---___---___---___---___---___---___---___---___---                    <<<  M E D I C A L   A T T E N D I N G    F O L L O W    U P   N O T E  >>>  sp i and D right hip  intubated    ---___---___---___---___---___---      <<<  MEDICATIONS:  >>>    MEDICATIONS  (STANDING):  buDESOnide   0.5 milliGRAM(s) Respule 0.5 milliGRAM(s) Inhalation every 12 hours  cefepime   IVPB 1000 milliGRAM(s) IV Intermittent every 24 hours  chlorhexidine 0.12% Liquid 15 milliLiter(s) Swish and Spit <User Schedule>  chlorhexidine 4% Liquid 1 Application(s) Topical <User Schedule>  dexmedetomidine Infusion 0.3 MICROgram(s)/kG/Hr (3.78 mL/Hr) IV Continuous <Continuous>  dextrose 5% + sodium chloride 0.9%. 1000 milliLiter(s) (100 mL/Hr) IV Continuous <Continuous>  enoxaparin Injectable 40 milliGRAM(s) SubCutaneous every 24 hours  fentaNYL   Infusion 0.5 MICROgram(s)/kG/Hr (1.26 mL/Hr) IV Continuous <Continuous>  hydrocortisone sodium succinate Injectable 50 milliGRAM(s) IV Push every 6 hours  insulin lispro (HumaLOG) corrective regimen sliding scale   SubCutaneous every 4 hours  norepinephrine Infusion 0.05 MICROgram(s)/kG/Min (4.725 mL/Hr) IV Continuous <Continuous>  pantoprazole  Injectable 40 milliGRAM(s) IV Push daily  vancomycin  IVPB 750 milliGRAM(s) IV Intermittent every 12 hours  vasopressin Infusion 0.03 Unit(s)/Min (1.8 mL/Hr) IV Continuous <Continuous>      MEDICATIONS  (PRN):  HYDROmorphone  Injectable 0.5 milliGRAM(s) IV Push every 3 hours PRN pain       ---___---___---___---___---___---     <<<REVIEW OF SYSTEM: >>>  unable to assess   pt  sedated    ---___---___---___---___---___---          <<<  VITAL SIGNS: >>>    T(F): 99 (18 @ 03:00), Max: 99 (18 @ 03:00)  HR: 80 (18 @ 08:04) (70 - 122)  BP: 76/51 (18 @ 20:15) (76/51 - 127/58)  RR: 16 (18 @ 06:45) (12 - 48)  SpO2: 92% (18 @ 08:04) (72% - 100%)  Wt(kg): --  CAPILLARY BLOOD GLUCOSE      POCT Blood Glucose.: 203 mg/dL (2018 03:00)    I&O's Summary    2018 07:01  -  2018 07:00  --------------------------------------------------------  IN: 3925.2 mL / OUT: 1160 mL / NET: 2765.2 mL         ---___---___---___---___---___---                       PHYSICAL EXAM:    GEN: intubated sedated   HEENT: NCAT, PERRL, MMM, no scleral icterus, hearing intact  NECK: Supple, No JVD  CVS: S1S2 , regular , No M/R/G appreciated  PULM: CTA B/L,  no W/R/R appreciated  ABD.: soft. non tender, non distended,  bowel sounds present  Extrem: intact pulses , no edema noted  Derm: No rash or ecchymosis noted        ---___---___---___---___---___---     <<<  LAB AND IMAGING: >>>                          7.4    15.5  )-----------( 259      ( 2018 05:54 )             23.3                   130<L>  |  102  |  26<H>  ----------------------------<  293<H>  5.0   |  14<L>  |  0.73    Ca    7.9<L>      2018 05:54  Phos  3.3       Mg     2.2         TPro  4.5<L>  /  Alb  2.2<L>  /  TBili  0.3  /  DBili  0.1  /  AST  16  /  ALT  12  /  AlkPhos  165<H>      PT/INR - ( 2018 01:01 )   PT: 13.9 sec;   INR: 1.28 ratio         PTT - ( 2018 01:01 )  PTT:37.9 sec       Urinalysis Basic - ( 2018 04:56 )    Color: Yellow / Appearance: Clear / S.019 / pH: x  Gluc: x / Ketone: Negative  / Bili: Negative / Urobili: Negative   Blood: x / Protein: Trace / Nitrite: Negative   Leuk Esterase: Large / RBC: 0-2 /HPF / WBC 2-5 /HPF   Sq Epi: x / Non Sq Epi: x / Bacteria: x      CARDIAC MARKERS ( 2018 05:32 )  x     / x     / 65 U/L / x     / 3.7 ng/mL  CARDIAC MARKERS ( 2018 01:03 )  x     / x     / 89 U/L / x     / 3.8 ng/mL  CARDIAC MARKERS ( 2018 18:58 )  x     / x     / 121 U/L / x     / 3.8 ng/mL  CARDIAC MARKERS ( 2018 13:52 )  x     / x     / 102 U/L / x     / 4.4 ng/mL            ABG - ( 2018 00:59 )  pH, Arterial: 7.44  pH, Blood: x     /  pCO2: 24    /  pO2: 196   / HCO3: 16    / Base Excess: -7.4  /  SaO2: 100                     [All pertinent / recent available Imaging reports and other labs reviewed]     ---___---___---___---___---___---___ ---___---___---___---___---           <<<  A S S E S S M E N T   A N D   P L A N :  >>>          -GI/DVT Prophylaxis.    --------------------------------------------       >>______________________<<      Deniz Bolden .         phone   [phone]

## 2018-07-28 NOTE — DIETITIAN INITIAL EVALUATION ADULT. - NS AS NUTRI INTERV ENTERAL NUTRITION
When medically feasible, would recommend to initiate vital 1.2 @ 10ml/hr via OGT and increase as tolerated to goal rate of 35ml/[id number]. goalr ate provides 1008kcals and 63gm protein (23.1kcals/kg and 1.4gm protein/kg based on IBW; 43.5kg)

## 2018-07-28 NOTE — DIETITIAN INITIAL EVALUATION ADULT. - PROBLEM SELECTOR PLAN 8
Trop already peaked  EKG stat, could not find in ED or chart  No CP at this time so unlikely acute ACS however would still like EKG.  High risk for procedure tonight.

## 2018-07-28 NOTE — PHYSICAL THERAPY INITIAL EVALUATION ADULT - PRECAUTIONS/LIMITATIONS, REHAB EVAL
surgical precautions/fall precautions fall precautions/R post. hip precautions (pt s/p R hip arina on previous hospital admission, 6/22/18)/right hip precautions/surgical precautions R post. hip precautions (pt s/p R hip arina on previous hospital admission, 6/22/18)  XR PELVIS:  patient is status post right hip hemiarthroplasty. A cerclage wire overlies the right greater trochanter. There is an avulsed right greater trochanter fracture fragment abutting the right iliac wing. There is a questionable left acetabular fracture which was not seen on  patient is status post right hip hemiarthroplasty. A cerclage wire overlies the right greater trochanter. There is an avulsed right greater trochanter fracture fragment abutting the right iliac wing. There is a questionable left acetabular fracture which was not seen on  patient is status post right hip hemiarthroplasty. A cerclage wire overlies the right greater trochanter. There is an avulsed right greater trochanter fracture fragment abutting the right iliac wing. There is a questionable left acetabular fracture which was not seen on prior pelvis radiograph./right hip precautions/surgical precautions/fall precautions

## 2018-07-28 NOTE — PROGRESS NOTE ADULT - ASSESSMENT
68F pmhx of asthma, Alzheimer's anxiety/depression, HLD, UC, HTN, S/P right hip fracture s/p hip hemiarthroplasty after a fracture 1month ago, presented with weakness, worsening mentation, found to have sepsis, hyponatremia of 125->120-> 129.

## 2018-07-28 NOTE — PROGRESS NOTE ADULT - PROBLEM SELECTOR PLAN 1
Multifactorial in the setting of pain, poor oral intake, urine osm of 299, urine Na 27 after treatment which make less reliable. But seems ADH appropriate response.   Na now rising 128-> 130 which is acceptable  Continue d5/NS , but need bmp every 4 to 6 hours to monitor Na.   Goal sodium 135 over the next 24 hours.  switch off D5 if sodium levels fail to rise appropriately.  Avoid hypotonic solutions  TSH and AM cortisol levels noted.

## 2018-07-28 NOTE — DIETITIAN INITIAL EVALUATION ADULT. - PROBLEM SELECTOR PLAN 2
Severe sepsis 2/2 possible infected joint or spinal abscess vs. less likely UTI.  Pt has new onset severe back pain and LLE weakness of sudden onset last night due to pelvic fracture but also with rigors, fevers, chills and significant bandemia, leukocytosis, tachycardia and lactate >2.   -Urgent MRI spine, F/U CT c/a/p w/o contrast as pt has anaphylaxis to contrast.  Check blood cultures and urine cultures and F/U ortho recs  -Received 2L NS in ED and repeat lactate is downtrending.   -Covered with Vancomycin 1G IV and Meropenem to cover hospital acquired staph and gram negatives given recent instrumentation and significant bandemia.   -Check Right hip US given induration of surgical site, but lower suspicion as she is not having significant pain or drainage here.  Will F/U ortho recs for this as well.   -Continue Abx as above until we have clear source.  -Vanco level check before tomorrow's PM dose goal 15-20.

## 2018-07-28 NOTE — DIETITIAN INITIAL EVALUATION ADULT. - PROBLEM SELECTOR PLAN 9
Pt does take Magnesium at home, but was found to be hypomagnesemic to 1.4 which is low, but if she is lower that this at home this could be a cause of encephalopathy and muscle weakness.  Replete to goal > 4.  # Depression  Continue Sertraline 50mg PO daily  klonopin 2mg QHS  Melatonin Qhs  -Mirtazapine 45mg QH    #Alzheimers dementia  Continue Quietiapine 50mg QHS    # Chronic pain  Pt uses Medical marijuana per : 1ml TID of canibis oil.  Will defer to PMD if he wants to continue this.    #Right hip replacement  Lovenox PPX was stopped after she developed a hematoma in her right thigh and she was continued on the Plavix. WIll restart Lovenox here under supervision but DC if any signs of bleeding.  THis induration on the right thigh could also be remnant of this hematoma however I was not present for that examination and abscess cannot be excluded in this situation. Will order US of the area.

## 2018-07-28 NOTE — PHYSICAL THERAPY INITIAL EVALUATION ADULT - PERTINENT HX OF CURRENT PROBLEM, REHAB EVAL
68F pmhx of early onset Alzheimer disease, recent hip fracture from osteoporosis s/p repair last month, anxiety/depression, asthma, HLD and GERD who presents with severe sepsis and acute back pain and LE weakness. X-ray pelvis (7/26): comminuted L acetabular fx, likely nondisplaced L inf. pubic ramus fx, large hematoma R hip. CT A/P (7/26): new L2 compression fx, acute fx sternal manubrium. 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 68F pmhx of early onset Alzheimer disease, recent hip fracture from osteoporosis s/p repair last month (6/22), anxiety/depression, asthma, HLD and GERD who presents with severe sepsis and acute back pain and LE weakness. X-ray pelvis (7/26): comminuted L acetabular fx, likely nondisplaced L inf. pubic ramus fx, large hematoma R hip. CT A/P (7/26): new L2 compression fx, acute fx sternal manubrium. 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

## 2018-07-28 NOTE — PHYSICAL THERAPY INITIAL EVALUATION ADULT - TRANSFER TRAINING, PT EVAL
GOAL: Pt will perform ALL transfers with moderate assist, w/use of appropriate assistive device as needed, in 4 weeks.

## 2018-07-28 NOTE — PROGRESS NOTE ADULT - SUBJECTIVE AND OBJECTIVE BOX
HISTORY:  68 year old female with a past medical history of asthma on steroids, CVA (no residual deficits), pulmonary HTN, HLD, GERD, ulcerative colitis, fatty pancreas, impaired glucose tolerance, anxiety, and depression who presented on 7/26/2018 with altered mental status, rigors, and urinary frequency. Of note, patient was recently hospitalized for a right femoral neck fracture secondary to osteoporosis s/p hemiarthroplasty on 6/22/2018. She had been taking Plavix for her history of CVA and Lovenox for VTE prophylaxis but she developed a hematoma at her surgical site so the Lovenox was stopped. Since then, patient has been progressively more agitated, confused, and weak. Additionally, patient has been complaining of new onset low back pain and left hip pain. In the ED, patient was hemodynamically stable but noted to be hypoglycemic to the 20s so she received 2 amps of D50. She was also given 2 L of crystalloid and started on meropenem & vancomycin for concern of sepsis. CT scan revealed a 17 cm collection adjacent to the right hip prosthesis, an acute L2 compression fracture, and new left acetabular fracture. Patient was admitted to medicine but on 7/27/2018, patient was noted to be more altered, rigorous, tachypneic, febrile to 101 F, and hypotensive w/ SBP in the 60s so an RRT was called. She was given 1 L of crystalloid and started on a norepinephrine gtt. Patient subsequently admitted to SICU for hemodynamic monitoring. An emergent right axillary arterial line and left IJ central venous catheter was placed.    24 HOUR EVENTS:  - Hypoglycemic to 55 so given 1 amp of D50 and IV fluids adjusted to D5NS at 100 mL/hr.  - Patient went into worsening septic shock so 1 L bolus of LR given, vasopressin was added, and stress dose steroids were started. Blood cultures positive for MRSA so repeats sent. She was taken to the OR emergently for a right hip I&D with revision of her hemiarthroplasty. Copious amounts of pus was evacuated. Patient returned to SICU intubated on a vasopressin gtt only but had to be restarted on norepinephrine overnight.  - Discontinued patient's home Zoloft, Seroquel, melatonin, and Remeron. Added Duoneb and Pulmicort for her asthma since patient returned from OR intubated.  - Spine precautions started for her acute L2 compression fracture. HISTORY:  68 year old female with a past medical history of asthma on steroids, CVA (no residual deficits), pulmonary HTN, HLD, GERD, ulcerative colitis, fatty pancreas, impaired glucose tolerance, anxiety, and depression who presented on 7/26/2018 with altered mental status, rigors, and urinary frequency. Of note, patient was recently hospitalized for a right femoral neck fracture secondary to osteoporosis s/p hemiarthroplasty on 6/22/2018. She had been taking Plavix for her history of CVA and Lovenox for VTE prophylaxis but she developed a hematoma at her surgical site so the Lovenox was stopped. Since then, patient has been progressively more agitated, confused, and weak. Additionally, patient has been complaining of new onset low back pain and left hip pain. In the ED, patient was hemodynamically stable but noted to be hypoglycemic to the 20s so she received 2 amps of D50. She was also given 2 L of crystalloid and started on meropenem & vancomycin for concern of sepsis. CT scan revealed a 17 cm collection adjacent to the right hip prosthesis, an acute L2 compression fracture, and new left acetabular fracture. Patient was admitted to medicine but on 7/27/2018, patient was noted to be more altered, rigorous, tachypneic, febrile to 101 F, and hypotensive w/ SBP in the 60s so an RRT was called. She was given 1 L of crystalloid and started on a norepinephrine gtt. Patient subsequently admitted to SICU for hemodynamic monitoring. An emergent right axillary arterial line and left IJ central venous catheter was placed.    24 HOUR EVENTS:  - Hypoglycemic to 55 so given 1 amp of D50 and IV fluids adjusted to D5NS at 100 mL/hr.  - Patient went into worsening septic shock so 1 L bolus of LR given, vasopressin was added, and stress dose steroids were started. Blood cultures positive for MRSA so repeats sent. She was taken to the OR emergently for a right hip I&D with revision of her hemiarthroplasty. Copious amounts of pus was evacuated. Patient returned to SICU intubated on a vasopressin gtt only but had to be restarted on norepinephrine overnight.  - Discontinued patient's home Zoloft, Seroquel, melatonin, and Remeron. Added Duoneb and Pulmicort for her asthma since patient returned from OR intubated.  - Spine precautions started for her acute L2 compression fracture.    SUBJECTIVE/ROS:  [ ] A ten-point review of systems was otherwise negative except as noted.  [x] Due to altered mental status/intubation, subjective information were not able to be obtained from the patient. History was obtained, to the extent possible, from review of the chart and collateral sources of information.    NEURO  Exam: sedated, intermittently agitated, does not follow commands, moving all four extremities  Meds:  - dexmedetomidine Infusion 0.3 MICROgram(s)/kG/Hr IV Continuous <Continuous>  - fentaNYL   Infusion 0.5 MICROgram(s)/kG/Hr IV Continuous <Continuous>  - HYDROmorphone  Injectable 0.5 milliGRAM(s) IV Push every 3 hours PRN pain  [x] Adequacy of sedation and pain control has been assessed and adjusted    RESPIRATORY  RR: 20 (07-28-18 @ 06:00) (12 - 48)  SpO2: 95% (07-28-18 @ 06:00) (72% - 100%)  Exam: clear to auscultation bilaterally  Mechanical Ventilation: Mode: AC/ CMV (Assist Control/ Continuous Mandatory Ventilation), RR (machine): 16, RR (patient): 18, TV (machine): 450, FiO2: 40, PEEP: 5, ITime: 1, MAP: 7, PIP: 10  [x] Extubation Readiness Assessed  ABG - ( 28 Jul 2018 00:59 )  pH: 7.44  /  pCO2: 24    /  pO2: 196   / HCO3: 16    / Base Excess: -7.4  /  SaO2: 100   /    Lactate: 1.5  Meds: buDESOnide   0.5 milliGRAM(s) Respule 0.5 milliGRAM(s) Inhalation every 12 hours    CARDIOVASCULAR  HR: 77 (07-28-18 @ 06:00) (70 - 122)  BP: 76/51 (07-27-18 @ 20:15) (76/51 - 144/72)  BP(mean): 60 (07-27-18 @ 20:15) (60 - 97)  ABP: 116/58 (07-28-18 @ 06:00) (80/46 - 168/80)  ABP(mean): 80 (07-28-18 @ 06:00) (59 - 119)  Exam: regular rate and rhythm, S1S2  Cardiac Rhythm: sinus  Perfusion    [x]Adequate    [ ]Inadequate  Mentation   [ ]Normal       [x]Reduced  Extremities  [x]Warm         [ ]Cool  Volume Status [ ]Hypervolemic [x]Euvolemic [ ]Hypovolemic  Meds:  - norepinephrine Infusion 0.05 MICROgram(s)/kG/Min IV Continuous <Continuous>  - vasopressin Infusion 0.03 Unit(s)/Min IV Continuous <Continuous>    GI/NUTRITION  Exam: soft, nontender, nondistended  Diet: NPO  Meds: pantoprazole  Injectable 40 milliGRAM(s) IV Push daily    GENITOURINARY      128<L>  |  102  |  26<H>  ----------------------------<  338<H>  5.4<H>   |  15<L>  |  0.79    Ca    7.8<L>      28 Jul 2018 01:01  Phos  3.4  Mg     2.3  TPro  4.5<L>  /  Alb  2.2<L>  /  TBili  0.3  /  DBili  0.1  /  AST  16  /  ALT  12  /  AlkPhos  165<H>    [x] Blackman catheter, indication: urine output monitoring in the critically ill  Meds: dextrose 5% + sodium chloride 0.9% infuse at 100 mL/hr    HEMATOLOGIC  Meds: enoxaparin Injectable 40 milliGRAM(s) SubCutaneous every 24 hours  [x] VTE Prophylaxis                        7.5    12.7  )-----------( 214      ( 28 Jul 2018 01:01 )             23.0     PT/INR - ( 28 Jul 2018 01:01 )   PT: 13.9 sec;   INR: 1.28 ratio    PTT - ( 28 Jul 2018 01:01 )  PTT:37.9 sec    INFECTIOUS DISEASES  T(C): 37.2 (07-28-18 @ 03:00), Max: 37.2 (07-28-18 @ 03:00)  WBC Count:  - 12.7 K/uL (07-28 @ 01:01)  - 10.4 K/uL (07-27 @ 18:49)  - 11.8 K/uL (07-27 @ 13:30)  - 10.0 K/uL (07-27 @ 09:44)    Recent Cultures:  - Specimen Source: .Tissue 1 right hip fluid superficial, 07-27 @ 22:54; Results --; Gram Stain: No polymorphonuclear cells seen per low power field, Rare Gram variable coccobacilli per oil power field; Organism: --  - Specimen Source: .Tissue Other, 2 right hip tissue, 07-27 @ 21:26; Results --; Gram Stain: No polymorphonuclear cells seen per low power field, No organisms seen per oil power field; Organism: --  - Specimen Source: .Tissue Other, right hip soft tissue, 07-27 @ 21:22; Results --; Gram Stain: No polymorphonuclear cells seen per low power field, No organisms seen per oil power field; Organism: --  - Specimen Source: .Blood Blood, 07-27 @ 08:24; Results: Growth in anaerobic bottle: Gram Positive Cocci in Clusters, Growth in aerobic bottle: Gram Positive Cocci in Clusters; Gram Stain: Growth in anaerobic bottle: Gram Positive Cocci in Clusters, Growth in aerobic bottle: Gram Positive Cocci in Clusters; Organism: --  - Specimen Source: .Urine Catheterized, 07-26 @ 16:29; Results; No growth; Gram Stain: --; Organism: --  - Specimen Source: .Blood Blood-Peripheral, 07-26 @ 14:43; Results: Growth in aerobic and anaerobic bottles: Gram Positive Cocci in Clusters; Gram Stain: Growth in aerobic and anaerobic bottles: Gram Positive Cocci in Clusters; Organism: Blood Culture PCR  Meds:  - cefepime   IVPB 1000 milliGRAM(s) IV Intermittent every 24 hours  - vancomycin  IVPB 750 milliGRAM(s) IV Intermittent every 12 hours    ENDOCRINE  Capillary Blood Glucose:  - 203 mg/dL (28 Jul 2018 03:00)  - 183 mg/dL (27 Jul 2018 20:58)  - 131 mg/dL (27 Jul 2018 06:34)  Meds:  - hydrocortisone sodium succinate Injectable 50 milliGRAM(s) IV Push every 6 hours  - insulin lispro (HumaLOG) corrective regimen sliding scale   SubCutaneous every 4 hours    ACCESS DEVICES:  [x] Peripheral IV  [x] Central Venous Line	[ ] R	[x] L	[x] IJ	[ ] Fem	[ ] SC	Placed: 7/27/2018  [x] Arterial Line		[x] R	[ ] L	[ ] Fem	[ ] Rad	[x] Ax	Placed: 7/27/2018  [ ] PICC:					[ ] Mediport  [x] Urinary Catheter, Date Placed: 7/26/2018  [x] Necessity of urinary, arterial, and venous catheters discussed    OTHER MEDICATIONS:  - chlorhexidine 0.12% Liquid 15 milliLiter(s) Swish and Spit <User Schedule>  - chlorhexidine 4% Liquid 1 Application(s) Topical <User Schedule>    CODE STATUS: Full code.    IMAGING:

## 2018-07-28 NOTE — DIETITIAN INITIAL EVALUATION ADULT. - PROBLEM SELECTOR PLAN 7
Pt does not take antihyperglycemics at home but was persistently hypoglycemic in ED. Now resolved.  Check Q4hrs finger sticks and if < 70 persistently would start Dextrose 5% @30mls/hr as above.  Unclear what the etiology of this is except for decreased PO intake

## 2018-07-28 NOTE — PROGRESS NOTE ADULT - ASSESSMENT
67 yo female with dementia, history of UC, and s/p RT Hip hemiarthroplasty 6/22/18  Known post op hematoma  Here with low grade fever, rigors, increased confusion, found to have leukocytosis, 20% bands, severe sepsis- ICU, pressor use   Bld Cxs 7/26-7/27 all MRSA  POD # 1- R hip I&D, lavage, poly exchange- pus encountered, although GP coccobacilli seen on only 1 specimen GS  Remains on Vent and pressors post op  Afebrile, renal Fx stable  L hip Fx noted    Plan:  Await final Microbiology results   Continue Vanco at present dosing, trough acceptable  Cefepime continues, but I would expect hip Cxs to grow MRSA as well  Repeat Bld Cxs sent this AM  Favor TTE, possibly GAYATHRI  Weaning as tolerated  Hold any intervention for L hip until we've established control of MRSA  D/w Dr Altman and SICU team  D/w Ortho  > 35 mins spent in direct assessment of the patient, critical care, analysis of all pertinent data, discussion, counseling, and coordination of care; benefit, possible adverse effects, and limitations of antibiotics reviewed- all represents complex medical decision making

## 2018-07-28 NOTE — PROGRESS NOTE ADULT - SUBJECTIVE AND OBJECTIVE BOX
CC: f/u for high grade MRSA bacteremia, severe sepsis    Events noted and d/w Ortho and SICU team- s/p operative I&D R hip, pus encountered, lavage and poly exhange- remains sedated on Vent post op- on Levo and Vaso for BP support, minimal secretions; U/O 30-35/hr     REVIEW OF SYSTEMS:  Not proviede on Vent    Antimicrobials Day # 3  cefepime   IVPB 1000 milliGRAM(s) IV Intermittent every 12 hours  vancomycin  IVPB 750 milliGRAM(s) IV Intermittent every 12 hours    Other Medications Reviewed    ICU Vital Signs Last 24 Hrs  T(F): 98.6 (28 Jul 2018 08:00), Max: 99 (28 Jul 2018 03:00)  HR: 73 (28 Jul 2018 11:33) (69 - 118)  BP: 102/56 (28 Jul 2018 08:15) (76/51 - 127/58)  BP(mean): 70 (28 Jul 2018 08:15) (60 - 84)  ABP: 118/63 (28 Jul 2018 11:30) (80/46 - 168/80)  ABP(mean): 84 (28 Jul 2018 11:30) (59 - 119)  RR: 15 (28 Jul 2018 11:30) (12 - 48)  SpO2: 100% (28 Jul 2018 11:33) (72% - 100%)    PHYSICAL EXAM:  General: no acute distress on Vent  Eyes:  anicteric, no conjunctival injection, no discharge  Oropharynx: ETT	  Neck: LIJ CVL  Lungs: clear to auscultation  Heart: regular rate and rhythm; no murmur, rubs or gallops  Abdomen: soft, nondistended, nontender, without mass or organomegaly  Blackman  Skin: R hip incision dressing intact; hemovac x 2  Extremities: no edema  Neurologic: sedated    LAB RESULTS:                        7.4    15.5  )-----------( 259      ( 28 Jul 2018 05:54 )             23.3   07-28    130<L>  |  102  |  26<H>  ----------------------------<  293<H>  5.0   |  14<L>  |  0.73    Ca    7.9<L>      28 Jul 2018 05:54  Phos  3.3     07-28  Mg     2.2     07-28    TPro  4.5<L>  /  Alb  2.2<L>  /  TBili  0.3  /  DBili  0.1  /  AST  16  /  ALT  12  /  AlkPhos  165<H>  07-28    Vancomycin Level, Trough (07.28.18 @ 10:00)    Vancomycin Level, Trough: 18.8    MICROBIOLOGY:  RECENT CULTURES:  Culture - Tissue with Gram Stain (07.27.18 @ 22:54)    Gram Stain:   No polymorphonuclear cells seen per low power field  Rare Gram variable coccobacilli per oil power field    Specimen Source: .Tissue 1 right hip fluid superficial    Culture - Tissue with Gram Stain (07.27.18 @ 21:26)    Gram Stain:   No polymorphonuclear cells seen per low power field  No organisms seen per oil power field    Specimen Source: .Tissue Other, 2 right hip tissue    Culture - Tissue with Gram Stain (07.27.18 @ 21:22)    Gram Stain:   No polymorphonuclear cells seen per low power field  No organisms seen per oil power field    Specimen Source: .Tissue Other, right hip soft tissue    Culture - Blood (07.27.18 @ 08:24)    Gram Stain:   Growth in anaerobic bottle: Gram Positive Cocci in Clusters  Growth in aerobic bottle: Gram Positive Cocci in Clusters    Specimen Source: .Blood Blood    Culture Results:   Growth in anaerobic bottle: Gram Positive Cocci in Clusters  Growth in aerobic bottle: Gram Positive Cocci in Clusters    Culture - Blood (07.26.18 @ 14:43)    -  Methicillin resistant Staphylococcus aureus (MRSA): Detec    Gram Stain:   Growth in aerobic and anaerobic bottles: Gram Positive Cocci in Clusters           RADIOLOGY REVIEWED:  Xray Chest 1 View- PORTABLE-Urgent (07.27.18 @ 05:16) >  No consolidation, pleural effusion or   pneumothorax is seen.    CT Abdomen and Pelvis No Cont (07.26.18 @ 17:19) >  A 17.0 cm collection along the right lateral thigh musculature adjacent   to the right hip prosthesis. Superimposed infection is not excluded.     New compression fracture of the L2 vertebral body as above. Correlate   with pending lumbar spine MRI.    Mildly depressed acute fracture of the sternal manubrium.    Acute comminuted fracture of the left acetabulum.

## 2018-07-29 LAB
-  AMPICILLIN/SULBACTAM: SIGNIFICANT CHANGE UP
-  CEFAZOLIN: SIGNIFICANT CHANGE UP
-  CLINDAMYCIN: SIGNIFICANT CHANGE UP
-  DAPTOMYCIN: SIGNIFICANT CHANGE UP
-  ERYTHROMYCIN: SIGNIFICANT CHANGE UP
-  GENTAMICIN: SIGNIFICANT CHANGE UP
-  LINEZOLID: SIGNIFICANT CHANGE UP
-  OXACILLIN: SIGNIFICANT CHANGE UP
-  PENICILLIN: SIGNIFICANT CHANGE UP
-  RIFAMPIN: SIGNIFICANT CHANGE UP
-  TETRACYCLINE: SIGNIFICANT CHANGE UP
-  TRIMETHOPRIM/SULFAMETHOXAZOLE: SIGNIFICANT CHANGE UP
-  VANCOMYCIN: SIGNIFICANT CHANGE UP
ALBUMIN SERPL ELPH-MCNC: 2.9 G/DL — LOW (ref 3.3–5)
ALP SERPL-CCNC: 209 U/L — HIGH (ref 40–120)
ALT FLD-CCNC: 36 U/L — SIGNIFICANT CHANGE UP (ref 10–45)
ANION GAP SERPL CALC-SCNC: 10 MMOL/L — SIGNIFICANT CHANGE UP (ref 5–17)
ANION GAP SERPL CALC-SCNC: 15 MMOL/L — SIGNIFICANT CHANGE UP (ref 5–17)
ANION GAP SERPL CALC-SCNC: 16 MMOL/L — SIGNIFICANT CHANGE UP (ref 5–17)
APTT BLD: 32.3 SEC — SIGNIFICANT CHANGE UP (ref 27.5–37.4)
AST SERPL-CCNC: 47 U/L — HIGH (ref 10–40)
BASE EXCESS BLDV CALC-SCNC: -6.8 MMOL/L — LOW (ref -2–2)
BILIRUB DIRECT SERPL-MCNC: 0.3 MG/DL — HIGH (ref 0–0.2)
BILIRUB INDIRECT FLD-MCNC: 0.4 MG/DL — SIGNIFICANT CHANGE UP (ref 0.2–1)
BILIRUB SERPL-MCNC: 0.7 MG/DL — SIGNIFICANT CHANGE UP (ref 0.2–1.2)
BUN SERPL-MCNC: 32 MG/DL — HIGH (ref 7–23)
BUN SERPL-MCNC: 36 MG/DL — HIGH (ref 7–23)
BUN SERPL-MCNC: 36 MG/DL — HIGH (ref 7–23)
CALCIUM SERPL-MCNC: 7.6 MG/DL — LOW (ref 8.4–10.5)
CHLORIDE SERPL-SCNC: 104 MMOL/L — SIGNIFICANT CHANGE UP (ref 96–108)
CHLORIDE SERPL-SCNC: 104 MMOL/L — SIGNIFICANT CHANGE UP (ref 96–108)
CHLORIDE SERPL-SCNC: 105 MMOL/L — SIGNIFICANT CHANGE UP (ref 96–108)
CO2 BLDV-SCNC: 18 MMOL/L — LOW (ref 22–30)
CO2 SERPL-SCNC: 14 MMOL/L — LOW (ref 22–31)
CO2 SERPL-SCNC: 14 MMOL/L — LOW (ref 22–31)
CO2 SERPL-SCNC: 15 MMOL/L — LOW (ref 22–31)
CREAT SERPL-MCNC: 0.73 MG/DL — SIGNIFICANT CHANGE UP (ref 0.5–1.3)
CREAT SERPL-MCNC: 0.73 MG/DL — SIGNIFICANT CHANGE UP (ref 0.5–1.3)
CREAT SERPL-MCNC: 0.76 MG/DL — SIGNIFICANT CHANGE UP (ref 0.5–1.3)
CULTURE RESULTS: SIGNIFICANT CHANGE UP
GAS PNL BLDA: SIGNIFICANT CHANGE UP
GAS PNL BLDA: SIGNIFICANT CHANGE UP
GAS PNL BLDV: SIGNIFICANT CHANGE UP
GAS PNL BLDV: SIGNIFICANT CHANGE UP
GLUCOSE SERPL-MCNC: 162 MG/DL — HIGH (ref 70–99)
GLUCOSE SERPL-MCNC: 171 MG/DL — HIGH (ref 70–99)
GLUCOSE SERPL-MCNC: 178 MG/DL — HIGH (ref 70–99)
GRAM STN FLD: SIGNIFICANT CHANGE UP
GRAM STN FLD: SIGNIFICANT CHANGE UP
HCO3 BLDV-SCNC: 17 MMOL/L — LOW (ref 21–29)
HCT VFR BLD CALC: 22.7 % — LOW (ref 34.5–45)
HCT VFR BLD CALC: 23.4 % — LOW (ref 34.5–45)
HCT VFR BLD CALC: 24.4 % — LOW (ref 34.5–45)
HGB BLD-MCNC: 7.4 G/DL — LOW (ref 11.5–15.5)
HGB BLD-MCNC: 7.8 G/DL — LOW (ref 11.5–15.5)
HGB BLD-MCNC: 8 G/DL — LOW (ref 11.5–15.5)
HOROWITZ INDEX BLDV+IHG-RTO: 40 — SIGNIFICANT CHANGE UP
INR BLD: 1.1 RATIO — SIGNIFICANT CHANGE UP (ref 0.88–1.16)
MAGNESIUM SERPL-MCNC: 2 MG/DL — SIGNIFICANT CHANGE UP (ref 1.6–2.6)
MAGNESIUM SERPL-MCNC: 2 MG/DL — SIGNIFICANT CHANGE UP (ref 1.6–2.6)
MAGNESIUM SERPL-MCNC: 2.1 MG/DL — SIGNIFICANT CHANGE UP (ref 1.6–2.6)
MCHC RBC-ENTMCNC: 28.5 PG — SIGNIFICANT CHANGE UP (ref 27–34)
MCHC RBC-ENTMCNC: 28.8 PG — SIGNIFICANT CHANGE UP (ref 27–34)
MCHC RBC-ENTMCNC: 29.2 PG — SIGNIFICANT CHANGE UP (ref 27–34)
MCHC RBC-ENTMCNC: 32.7 GM/DL — SIGNIFICANT CHANGE UP (ref 32–36)
MCHC RBC-ENTMCNC: 33 GM/DL — SIGNIFICANT CHANGE UP (ref 32–36)
MCHC RBC-ENTMCNC: 33.5 GM/DL — SIGNIFICANT CHANGE UP (ref 32–36)
MCV RBC AUTO: 87.1 FL — SIGNIFICANT CHANGE UP (ref 80–100)
MCV RBC AUTO: 87.1 FL — SIGNIFICANT CHANGE UP (ref 80–100)
MCV RBC AUTO: 87.4 FL — SIGNIFICANT CHANGE UP (ref 80–100)
METHOD TYPE: SIGNIFICANT CHANGE UP
ORGANISM # SPEC MICROSCOPIC CNT: SIGNIFICANT CHANGE UP
OSMOLALITY UR: 598 MOS/KG — SIGNIFICANT CHANGE UP (ref 300–900)
PCO2 BLDV: 32 MMHG — LOW (ref 35–50)
PH BLDV: 7.36 — SIGNIFICANT CHANGE UP (ref 7.35–7.45)
PHOSPHATE SERPL-MCNC: 3.3 MG/DL — SIGNIFICANT CHANGE UP (ref 2.5–4.5)
PHOSPHATE SERPL-MCNC: 3.4 MG/DL — SIGNIFICANT CHANGE UP (ref 2.5–4.5)
PHOSPHATE SERPL-MCNC: 3.5 MG/DL — SIGNIFICANT CHANGE UP (ref 2.5–4.5)
PLATELET # BLD AUTO: 143 K/UL — LOW (ref 150–400)
PLATELET # BLD AUTO: 184 K/UL — SIGNIFICANT CHANGE UP (ref 150–400)
PLATELET # BLD AUTO: 192 K/UL — SIGNIFICANT CHANGE UP (ref 150–400)
PO2 BLDV: 40 MMHG — SIGNIFICANT CHANGE UP (ref 25–45)
POTASSIUM SERPL-MCNC: 3.3 MMOL/L — LOW (ref 3.5–5.3)
POTASSIUM SERPL-MCNC: 3.9 MMOL/L — SIGNIFICANT CHANGE UP (ref 3.5–5.3)
POTASSIUM SERPL-MCNC: 4.5 MMOL/L — SIGNIFICANT CHANGE UP (ref 3.5–5.3)
POTASSIUM SERPL-SCNC: 3.3 MMOL/L — LOW (ref 3.5–5.3)
POTASSIUM SERPL-SCNC: 3.9 MMOL/L — SIGNIFICANT CHANGE UP (ref 3.5–5.3)
POTASSIUM SERPL-SCNC: 4.5 MMOL/L — SIGNIFICANT CHANGE UP (ref 3.5–5.3)
PROT SERPL-MCNC: 5 G/DL — LOW (ref 6–8.3)
PROTHROM AB SERPL-ACNC: 12 SEC — SIGNIFICANT CHANGE UP (ref 9.8–12.7)
RBC # BLD: 2.61 M/UL — LOW (ref 3.8–5.2)
RBC # BLD: 2.69 M/UL — LOW (ref 3.8–5.2)
RBC # BLD: 2.79 M/UL — LOW (ref 3.8–5.2)
RBC # FLD: 18 % — HIGH (ref 10.3–14.5)
RBC # FLD: 18 % — HIGH (ref 10.3–14.5)
RBC # FLD: 18.1 % — HIGH (ref 10.3–14.5)
SAO2 % BLDV: 69 % — SIGNIFICANT CHANGE UP (ref 67–88)
SODIUM SERPL-SCNC: 129 MMOL/L — LOW (ref 135–145)
SODIUM SERPL-SCNC: 133 MMOL/L — LOW (ref 135–145)
SODIUM SERPL-SCNC: 135 MMOL/L — SIGNIFICANT CHANGE UP (ref 135–145)
SPECIMEN SOURCE: SIGNIFICANT CHANGE UP
WBC # BLD: 12.5 K/UL — HIGH (ref 3.8–10.5)
WBC # BLD: 13.7 K/UL — HIGH (ref 3.8–10.5)
WBC # BLD: 9.1 K/UL — SIGNIFICANT CHANGE UP (ref 3.8–10.5)
WBC # FLD AUTO: 12.5 K/UL — HIGH (ref 3.8–10.5)
WBC # FLD AUTO: 13.7 K/UL — HIGH (ref 3.8–10.5)
WBC # FLD AUTO: 9.1 K/UL — SIGNIFICANT CHANGE UP (ref 3.8–10.5)

## 2018-07-29 PROCEDURE — 71045 X-RAY EXAM CHEST 1 VIEW: CPT | Mod: 26

## 2018-07-29 PROCEDURE — 99233 SBSQ HOSP IP/OBS HIGH 50: CPT | Mod: GC

## 2018-07-29 PROCEDURE — 93306 TTE W/DOPPLER COMPLETE: CPT | Mod: 26

## 2018-07-29 RX ORDER — POTASSIUM CHLORIDE 20 MEQ
20 PACKET (EA) ORAL
Qty: 0 | Refills: 0 | Status: COMPLETED | OUTPATIENT
Start: 2018-07-29 | End: 2018-07-30

## 2018-07-29 RX ORDER — HYDROMORPHONE HYDROCHLORIDE 2 MG/ML
0.5 INJECTION INTRAMUSCULAR; INTRAVENOUS; SUBCUTANEOUS
Qty: 0 | Refills: 0 | Status: DISCONTINUED | OUTPATIENT
Start: 2018-07-29 | End: 2018-07-30

## 2018-07-29 RX ORDER — DAPTOMYCIN 500 MG/10ML
500 INJECTION, POWDER, LYOPHILIZED, FOR SOLUTION INTRAVENOUS ONCE
Qty: 0 | Refills: 0 | Status: COMPLETED | OUTPATIENT
Start: 2018-07-29 | End: 2018-07-29

## 2018-07-29 RX ORDER — CEFTAROLINE FOSAMIL 600 MG/20ML
600 POWDER, FOR SOLUTION INTRAVENOUS EVERY 12 HOURS
Qty: 0 | Refills: 0 | Status: DISCONTINUED | OUTPATIENT
Start: 2018-07-29 | End: 2018-08-03

## 2018-07-29 RX ORDER — DAPTOMYCIN 500 MG/10ML
INJECTION, POWDER, LYOPHILIZED, FOR SOLUTION INTRAVENOUS
Qty: 0 | Refills: 0 | Status: DISCONTINUED | OUTPATIENT
Start: 2018-07-29 | End: 2018-08-03

## 2018-07-29 RX ORDER — DAPTOMYCIN 500 MG/10ML
500 INJECTION, POWDER, LYOPHILIZED, FOR SOLUTION INTRAVENOUS EVERY 24 HOURS
Qty: 0 | Refills: 0 | Status: DISCONTINUED | OUTPATIENT
Start: 2018-07-30 | End: 2018-08-03

## 2018-07-29 RX ORDER — HEPARIN SODIUM 5000 [USP'U]/ML
5000 INJECTION INTRAVENOUS; SUBCUTANEOUS EVERY 8 HOURS
Qty: 0 | Refills: 0 | Status: DISCONTINUED | OUTPATIENT
Start: 2018-07-29 | End: 2018-07-31

## 2018-07-29 RX ADMIN — Medication 1: at 13:58

## 2018-07-29 RX ADMIN — HYDROMORPHONE HYDROCHLORIDE 0.5 MILLIGRAM(S): 2 INJECTION INTRAMUSCULAR; INTRAVENOUS; SUBCUTANEOUS at 23:22

## 2018-07-29 RX ADMIN — CHLORHEXIDINE GLUCONATE 1 APPLICATION(S): 213 SOLUTION TOPICAL at 05:16

## 2018-07-29 RX ADMIN — HYDROMORPHONE HYDROCHLORIDE 0.5 MILLIGRAM(S): 2 INJECTION INTRAMUSCULAR; INTRAVENOUS; SUBCUTANEOUS at 05:29

## 2018-07-29 RX ADMIN — Medication 50 MILLIGRAM(S): at 05:15

## 2018-07-29 RX ADMIN — HYDROMORPHONE HYDROCHLORIDE 0.5 MILLIGRAM(S): 2 INJECTION INTRAMUSCULAR; INTRAVENOUS; SUBCUTANEOUS at 22:34

## 2018-07-29 RX ADMIN — CEFTAROLINE FOSAMIL 50 MILLIGRAM(S): 600 POWDER, FOR SOLUTION INTRAVENOUS at 17:18

## 2018-07-29 RX ADMIN — HEPARIN SODIUM 5000 UNIT(S): 5000 INJECTION INTRAVENOUS; SUBCUTANEOUS at 22:16

## 2018-07-29 RX ADMIN — Medication 1 MILLIGRAM(S): at 22:33

## 2018-07-29 RX ADMIN — Medication 1: at 17:55

## 2018-07-29 RX ADMIN — DOPAMINE HYDROCHLORIDE 9.45 MICROGRAM(S)/KG/MIN: 40 INJECTION, SOLUTION, CONCENTRATE INTRAVENOUS at 08:11

## 2018-07-29 RX ADMIN — HYDROMORPHONE HYDROCHLORIDE 0.5 MILLIGRAM(S): 2 INJECTION INTRAMUSCULAR; INTRAVENOUS; SUBCUTANEOUS at 02:30

## 2018-07-29 RX ADMIN — Medication 0.5 MILLIGRAM(S): at 05:29

## 2018-07-29 RX ADMIN — CEFEPIME 100 MILLIGRAM(S): 1 INJECTION, POWDER, FOR SOLUTION INTRAMUSCULAR; INTRAVENOUS at 05:15

## 2018-07-29 RX ADMIN — ENOXAPARIN SODIUM 40 MILLIGRAM(S): 100 INJECTION SUBCUTANEOUS at 05:14

## 2018-07-29 RX ADMIN — Medication 1 MILLIGRAM(S): at 16:20

## 2018-07-29 RX ADMIN — CHLORHEXIDINE GLUCONATE 15 MILLILITER(S): 213 SOLUTION TOPICAL at 22:17

## 2018-07-29 RX ADMIN — Medication 250 MILLIGRAM(S): at 10:41

## 2018-07-29 RX ADMIN — Medication 1 MILLIGRAM(S): at 19:13

## 2018-07-29 RX ADMIN — Medication 1 MILLIGRAM(S): at 12:30

## 2018-07-29 RX ADMIN — Medication 1 MILLIGRAM(S): at 23:22

## 2018-07-29 RX ADMIN — HYDROMORPHONE HYDROCHLORIDE 0.5 MILLIGRAM(S): 2 INJECTION INTRAMUSCULAR; INTRAVENOUS; SUBCUTANEOUS at 17:38

## 2018-07-29 RX ADMIN — HYDROMORPHONE HYDROCHLORIDE 0.5 MILLIGRAM(S): 2 INJECTION INTRAMUSCULAR; INTRAVENOUS; SUBCUTANEOUS at 19:28

## 2018-07-29 RX ADMIN — HYDROMORPHONE HYDROCHLORIDE 0.5 MILLIGRAM(S): 2 INJECTION INTRAMUSCULAR; INTRAVENOUS; SUBCUTANEOUS at 10:55

## 2018-07-29 RX ADMIN — Medication 50 MILLIGRAM(S): at 17:18

## 2018-07-29 RX ADMIN — HYDROMORPHONE HYDROCHLORIDE 0.5 MILLIGRAM(S): 2 INJECTION INTRAMUSCULAR; INTRAVENOUS; SUBCUTANEOUS at 15:00

## 2018-07-29 RX ADMIN — HYDROMORPHONE HYDROCHLORIDE 0.5 MILLIGRAM(S): 2 INJECTION INTRAMUSCULAR; INTRAVENOUS; SUBCUTANEOUS at 10:40

## 2018-07-29 RX ADMIN — DAPTOMYCIN 120 MILLIGRAM(S): 500 INJECTION, POWDER, LYOPHILIZED, FOR SOLUTION INTRAVENOUS at 12:05

## 2018-07-29 RX ADMIN — Medication 1: at 10:57

## 2018-07-29 RX ADMIN — HYDROMORPHONE HYDROCHLORIDE 0.5 MILLIGRAM(S): 2 INJECTION INTRAMUSCULAR; INTRAVENOUS; SUBCUTANEOUS at 05:09

## 2018-07-29 RX ADMIN — DEXMEDETOMIDINE HYDROCHLORIDE IN 0.9% SODIUM CHLORIDE 3.78 MICROGRAM(S)/KG/HR: 4 INJECTION INTRAVENOUS at 08:11

## 2018-07-29 RX ADMIN — SODIUM CHLORIDE 100 MILLILITER(S): 9 INJECTION INTRAMUSCULAR; INTRAVENOUS; SUBCUTANEOUS at 08:11

## 2018-07-29 RX ADMIN — Medication 1: at 22:34

## 2018-07-29 RX ADMIN — Medication 50 MILLIGRAM(S): at 11:41

## 2018-07-29 RX ADMIN — Medication 1: at 03:16

## 2018-07-29 RX ADMIN — HYDROMORPHONE HYDROCHLORIDE 0.5 MILLIGRAM(S): 2 INJECTION INTRAMUSCULAR; INTRAVENOUS; SUBCUTANEOUS at 20:57

## 2018-07-29 RX ADMIN — HYDROMORPHONE HYDROCHLORIDE 0.5 MILLIGRAM(S): 2 INJECTION INTRAMUSCULAR; INTRAVENOUS; SUBCUTANEOUS at 02:50

## 2018-07-29 RX ADMIN — Medication 1 MILLIGRAM(S): at 20:32

## 2018-07-29 RX ADMIN — Medication 0.5 MILLIGRAM(S): at 17:13

## 2018-07-29 RX ADMIN — HYDROMORPHONE HYDROCHLORIDE 0.5 MILLIGRAM(S): 2 INJECTION INTRAMUSCULAR; INTRAVENOUS; SUBCUTANEOUS at 15:15

## 2018-07-29 RX ADMIN — HYDROMORPHONE HYDROCHLORIDE 0.5 MILLIGRAM(S): 2 INJECTION INTRAMUSCULAR; INTRAVENOUS; SUBCUTANEOUS at 08:25

## 2018-07-29 RX ADMIN — HYDROMORPHONE HYDROCHLORIDE 0.5 MILLIGRAM(S): 2 INJECTION INTRAMUSCULAR; INTRAVENOUS; SUBCUTANEOUS at 17:55

## 2018-07-29 RX ADMIN — CHLORHEXIDINE GLUCONATE 15 MILLILITER(S): 213 SOLUTION TOPICAL at 15:01

## 2018-07-29 RX ADMIN — PANTOPRAZOLE SODIUM 40 MILLIGRAM(S): 20 TABLET, DELAYED RELEASE ORAL at 11:40

## 2018-07-29 RX ADMIN — CHLORHEXIDINE GLUCONATE 15 MILLILITER(S): 213 SOLUTION TOPICAL at 05:15

## 2018-07-29 RX ADMIN — HYDROMORPHONE HYDROCHLORIDE 0.5 MILLIGRAM(S): 2 INJECTION INTRAMUSCULAR; INTRAVENOUS; SUBCUTANEOUS at 08:10

## 2018-07-29 NOTE — PROGRESS NOTE ADULT - ASSESSMENT
ASSESSMENT:  68 year old female presenting with symptomatic hypoglycemia and septic shock secondary to an infected right hip s/p right hip I&D with hemiarthroplasty revision.    PLAN:    Neuro: acute post-op pain, intubated, anxiety, depression, dementia  - Monitor mental status.  - Sedation with Precedex while intubated.  - Dilaudid PRN pain.  - Holding home Zoloft, Seroquel, Remeron, and melatonin.    Resp: acute post-op insufficiency, asthma  - Monitor pulse oximeter.  - Mechanical ventilation for acute post-op insufficiency. SBT with goal to extubate this AM.  - Pulmicort and Duoneb for asthma.    CV: septic vs. cardiogenic shock  - Monitor vital signs.  - Wean dopamine gtt as tolerated with goal MAP > 65.    GI: no acute issues  - NPO with Vital at 10 mL/hr via OG tube. Can advance to goal if unable to extubate.  - Protonix for GERD.    Renal: CKD stage 2, hyponatremia  - Monitor I&Os.  - Monitor electrolytes and replete as necessary.  - NS at 100 mL/hr while being actively resuscitated, hypoglycemic, hyponatremic, and NPO.    Heme: no acute issues  - Lovenox for VTE prophylaxis.    ID: MRSA bacteremia, infected right hip s/p I&D  - Monitor WBC, temperature, and procalcitonin.  - Follow up OR and repeat blood cultures. Reculture as clinically indicated.  - Vancomycin and cefepime for empiric antibiotics. Will narrow down coverage when OR cultures result.  - Will need MRI to rule out lumbar epidural abscess.    Endo: hypoglycemia (resolved), hyperglycemia, adrenal insufficiency  - Stress dose steroids for septic shock as patient is chronically on prednisone for her asthma.  - Monitor fingersticks q4hrs for hypoglycemia.    Misc: infected right hip s/p I&D  - WBAT RLE and foot flat weight bear LLE.    Disposition:  - Full code.  - Will remain in SICU for respiratory and hemodynamic monitoring.    Cynthia Wilkins PA-C    [id number]

## 2018-07-29 NOTE — PROGRESS NOTE ADULT - ATTENDING COMMENTS
Comfortable on precedex drip and prn Dilaudid  Met acidosis compensated on vent  On Dopamine as inotrope, SvO2 improved, urine output picked up  Persistent bacteremia with MRSA, abx vanco changed to Dapto and Teflaro, repeat cultures, ECHO to assess for cardiac function possible endocarditis, may need GAYATHRI  Needs another wash out or removal of hardware  Hyponatremia resolving with IVF NS  Non AG met acidosis likely ATN, although srum Cr not high, high urinary AG  Condition remains critical Comfortable on precedex drip and prn Dilaudid  Met acidosis compensated on vent  On Dopamine as inotrope, SvO2 improved, urine output picked up  Persistent bacteremia with MRSA, abx vanco changed to Dapto and Teflaro, repeat cultures, ECHO to assess for cardiac function possible endocarditis, may need GAYATHRI  Needs another wash out or removal of hardware  Hyponatremia resolving with IVF NS  Non AG met acidosis likely ATN, although serum Cr not high, high urinary AG  Condition remains critical

## 2018-07-29 NOTE — PROGRESS NOTE ADULT - SUBJECTIVE AND OBJECTIVE BOX
HISTORY:  68 year old female with a past medical history of asthma on steroids, CVA (no residual deficits), pulmonary HTN, HLD, GERD, ulcerative colitis, fatty pancreas, impaired glucose tolerance, anxiety, and depression who presented on 7/26/2018 with altered mental status, rigors, and urinary frequency. Of note, patient was recently hospitalized for a right femoral neck fracture secondary to osteoporosis s/p hemiarthroplasty on 6/22/2018. She had been taking Plavix for her history of CVA and Lovenox for VTE prophylaxis but she developed a hematoma at her surgical site so the Lovenox was stopped. Since then, patient has been progressively more agitated, confused, and weak. Additionally, patient has been complaining of new onset low back pain and left hip pain. In the ED, patient was hemodynamically stable but noted to be hypoglycemic to the 20s so she received 2 amps of D50. She was also given 2 L of crystalloid and started on meropenem & vancomycin for concern of sepsis. CT scan revealed a 17 cm collection adjacent to the right hip prosthesis, an acute L2 compression fracture, and new left acetabular fracture. Patient was admitted to medicine but on 7/27/2018, patient was noted to be more altered, rigorous, tachypneic, febrile to 101 F, and hypotensive w/ SBP in the 60s so an RRT was called. She was given 1 L of crystalloid and started on a norepinephrine gtt. Patient subsequently admitted to SICU for hemodynamic monitoring. An emergent right axillary arterial line and left IJ central venous catheter was placed.    24 HOUR EVENTS:  - Fentanyl gtt d/gracie and started on Dilaudid PRN for pain control.  - OG tube placed and trickle feeds with Vital started at 10 mL/hr.  - Renal function was improving so cefepime dosing increased from daily to BID. Overnight, patient became oliguric. Patient was given a total of 500 mL of 5% albumin with minimal effect. SvO2 noted to be 44% and appeared euvolemic but hypokinetic on critical care bedside ultrasound so she was transitioned from norepinephrine and vasopressin to dopamine, which improved her UOP and SvO2.  - 1 unit of PRBCs given for resuscitation and HCT of 23.3. Responded appropriately to 25.2.  - Vancomycin trough 18.8 so keeping vancomycin dosing at 750 mg BID.  - Blood cultures from 7/28/2018 positive with Gram positive cocci in clusters so repeat blood cultures sent.  - Patient now hyperglycemic so started on low dose ISS and IV fluids changed from D5NS to NS.    SUBJECTIVE/ROS:  [ ] A ten-point review of systems was otherwise negative except as noted.  [x] Due to altered mental status/intubation, subjective information were not able to be obtained from the patient. History was obtained, to the extent possible, from review of the chart and collateral sources of information.    NEURO  Exam: sedated, intermittently agitated, does not follow commands, moving all four extremities  Meds:  - dexmedetomidine Infusion 0.3 MICROgram(s)/kG/Hr IV Continuous <Continuous>  - HYDROmorphone  Injectable 0.5 milliGRAM(s) IV Push every 3 hours PRN pain  [x] Adequacy of sedation and pain control has been assessed and adjusted    RESPIRATORY  RR: 12 (07-29-18 @ 03:45) (10 - 38)  SpO2: 100% (07-29-18 @ 03:45) (56% - 100%)  Exam: clear to auscultation bilaterally  Mechanical Ventilation: Mode: AC/ CMV (Assist Control/ Continuous Mandatory Ventilation), RR (machine): 16, RR (patient): 21, TV (machine): 400, FiO2: 40, PEEP: 5, ITime: 1, MAP: 10, PIP: 19  [x] Extubation Readiness Assessed  ABG - ( 29 Jul 2018 02:49 )  pH: 7.41  /  pCO2: 26    /  pO2: 142   / HCO3: 16    / Base Excess: -7.6  /  SaO2: 100   /    Lactate: 1.0  Meds: buDESOnide   0.5 milliGRAM(s) Respule 0.5 milliGRAM(s) Inhalation every 12 hours    CARDIOVASCULAR  HR: 92 (07-29-18 @ 03:45) (69 - 102)  BP: 107/68 (07-28-18 @ 19:00) (102/56 - 107/68)  BP(mean): 82 (07-28-18 @ 19:00) (70 - 82)  ABP: 141/83 (07-29-18 @ 03:45) (92/47 - 173/92)  ABP(mean): 108 (07-29-18 @ 03:45) (63 - 126)  VBG - ( 29 Jul 2018 02:49 )  pH: 7.36  /  pCO2: 32    /  pO2: 40    / HCO3: 17    / Base Excess: -6.8  /  SaO2: 69  Exam: regular rate and rhythm, S1S2  Cardiac Rhythm: sinus  Perfusion    [x]Adequate    [ ]Inadequate  Mentation   [ ]Normal       [x]Reduced  Extremities  [x]Warm         [ ]Cool  Volume Status [ ]Hypervolemic [x]Euvolemic [ ]Hypovolemic  Meds: DOPamine Infusion 5 MICROgram(s)/kG/Min IV Continuous <Continuous>    GI/NUTRITION  Exam: soft, nontender, nondistended  Diet: NPO  Meds: pantoprazole  Injectable 40 milliGRAM(s) IV Push daily    GENITOURINARY      129<L>  |  104  |  32<H>  ----------------------------<  171<H>  4.5   |  15<L>  |  0.76    Ca    7.6<L>      29 Jul 2018 02:58  Phos  3.5  Mg     2.1    TPro  5.0<L>  /  Alb  2.9<L>  /  TBili  0.7  /  DBili  0.3<H>  /  AST  47<H>  /  ALT  36  /  AlkPhos  209<H>    [x] Blackman catheter, indication: urine output monitoring in the critically ill  Meds: sodium chloride 0.9% infuse at 100 mL/hr    HEMATOLOGIC  Meds: enoxaparin Injectable 40 milliGRAM(s) SubCutaneous every 24 hours  [x] VTE Prophylaxis                        8.0    13.7  )-----------( 192      ( 29 Jul 2018 02:58 )             24.4     PT/INR - ( 29 Jul 2018 02:58 )   PT: 12.0 sec;   INR: 1.10 ratio    PTT - ( 29 Jul 2018 02:58 )  PTT:32.3 sec    INFECTIOUS DISEASES  T(C): 36.4 (07-29-18 @ 03:00), Max: 37.1 (07-28-18 @ 12:00)  WBC Count:  - 13.7 K/uL (07-29 @ 02:58)  - 11.3 K/uL (07-28 @ 22:13)  - 13.1 K/uL (07-28 @ 13:38)  - 15.5 K/uL (07-28 @ 05:54)  Recent Cultures:  - Specimen Source: .Blood Blood, 07-28 @ 05:27; Results" Growth in aerobic bottle: Gram Positive Cocci in Clusters, Growth in anaerobic bottle: Gram Positive Cocci in Clusters; Gram Stain: Growth in aerobic bottle: Gram Positive Cocci in Clusters, Growth in anaerobic bottle: Gram Positive Cocci in Clusters; Organism: --  - Specimen Source: .Tissue 1 right hip fluid superficial, 07-27 @ 22:54; Result: Moderate Staphylococcus aureus; Gram Stain: No polymorphonuclear cells seen per low power field, Rare Gram variable coccobacilli per oil power field; Organism: --  - Specimen Source: .Surgical Swab right hip fluid deep, 07-27 @ 22:34; Results: Moderate Staphylococcus aureus See previous culture [id number]; Gram Stain: --; Organism: --  - Specimen Source: .Tissue Other, 2 right hip tissue, 07-27 @ 21:26; Results: Few Staphylococcus aureus; Gram Stain: No polymorphonuclear cells seen per low power field, No organisms seen per oil power field; Organism: --  - Specimen Source: .Tissue Other, right hip soft tissue, 07-27 @ 21:22; Results: Few Staphylococcus aureus; Gram Stain: No polymorphonuclear cells seen per low power field, No organisms seen per oil power field; Organism: --  - Specimen Source: .Blood Blood, 07-27 @ 08:24; Results: Growth in aerobic and anaerobic bottles: Staphylococcus aureus See previous culture [id number]; Gram Stain: Growth in anaerobic bottle: Gram Positive Cocci in Clusters, Growth in aerobic bottle: Gram Positive Cocci in Clusters; Organism: --  - Specimen Source: .Urine Catheterized, 07-26 @ 16:29; Results: No growth; Gram Stain: --; Organism: --  - Specimen Source: .Blood Blood-Peripheral, 07-26 @ 14:43; Results: Growth in aerobic and anaerobic bottles: Staphylococcus aureus, See previous culture [id number]; Gram Stain: Growth in aerobic and anaerobic bottles: Gram Positive Cocci in Clusters; Organism: Blood Culture PCR  Meds:  - cefepime   IVPB 1000 milliGRAM(s) IV Intermittent every 12 hours  - vancomycin  IVPB 750 milliGRAM(s) IV Intermittent every 12 hours    ENDOCRINE  Capillary Blood Glucose:  - 203 mg/dL  - 199 mg/dL  - 131 mg/dL  - 127 mg/dL  - 166 mg/dL  - 164 mg/dL  - 171 mg/dL  Meds:  - hydrocortisone sodium succinate Injectable 50 milliGRAM(s) IV Push every 6 hours  - insulin lispro (HumaLOG) corrective regimen sliding scale   SubCutaneous every 4 hours    ACCESS DEVICES:  [x] Peripheral IV  [x] Central Venous Line	[ ] R	[x] L	[x] IJ	[ ] Fem	[ ] SC	Placed: 7/27/2018  [x] Arterial Line		[x] R	[ ] L	[ ] Fem	[ ] Rad	[x] Ax	Placed: 7/27/2018  [ ] PICC:					[ ] Mediport  [x] Urinary Catheter, Date Placed: 7/26/2018  [x] Necessity of urinary, arterial, and venous catheters discussed    OTHER MEDICATIONS:  - chlorhexidine 0.12% Liquid 15 milliLiter(s) Swish and Spit <User Schedule>  - chlorhexidine 4% Liquid 1 Application(s) Topical <User Schedule>    CODE STATUS: Full code.    IMAGING:

## 2018-07-29 NOTE — PROGRESS NOTE ADULT - ASSESSMENT
68F pmhx of asthma, Alzheimer's anxiety/depression, HLD, UC, HTN, S/P right hip fracture s/p hip hemiarthroplasty after a fracture 1month ago, presented with weakness, worsening mentation, found to have sepsis, hyponatremia

## 2018-07-29 NOTE — CHART NOTE - NSCHARTNOTEFT_GEN_A_CORE
Discussed with Dr Rey and Dr Shaffer. Will continue to monitor patient. If patient fails to improve or decompensates, will perform explant of right hip prosthesis. Will consider girdlestone procedure versus antibiotic spacer. Plan for OR 8/3/18 unless clinical condition necessitates earlier intervention. Discussed with Dr Rey. Will continue to monitor patient. If patient fails to improve or decompensates, will perform explant of right hip prosthesis. Will consider girdlestone procedure versus antibiotic spacer. Plan for OR 8/3/18 unless clinical condition necessitates earlier intervention.

## 2018-07-29 NOTE — PROGRESS NOTE ADULT - SUBJECTIVE AND OBJECTIVE BOX
---___---___---___---___---___---___ ---___---___---___---___---___---___---___---___---___---                    <<<  M E D I C A L   A T T E N D I N G    F O L L O W    U P   N O T E  >>>    today patient awake and breathing heavily still intubated    ---___---___---___---___---___---      <<<  MEDICATIONS:  >>>    MEDICATIONS  (STANDING):  buDESOnide   0.5 milliGRAM(s) Respule 0.5 milliGRAM(s) Inhalation every 12 hours  cefepime   IVPB 1000 milliGRAM(s) IV Intermittent every 12 hours  chlorhexidine 0.12% Liquid 15 milliLiter(s) Swish and Spit <User Schedule>  chlorhexidine 4% Liquid 1 Application(s) Topical <User Schedule>  dexmedetomidine Infusion 0.3 MICROgram(s)/kG/Hr (3.78 mL/Hr) IV Continuous <Continuous>  DOPamine Infusion 5 MICROgram(s)/kG/Min (9.45 mL/Hr) IV Continuous <Continuous>  enoxaparin Injectable 40 milliGRAM(s) SubCutaneous every 24 hours  hydrocortisone sodium succinate Injectable 50 milliGRAM(s) IV Push every 6 hours  insulin lispro (HumaLOG) corrective regimen sliding scale   SubCutaneous every 4 hours  pantoprazole  Injectable 40 milliGRAM(s) IV Push daily  sodium chloride 0.9%. 1000 milliLiter(s) (100 mL/Hr) IV Continuous <Continuous>  vancomycin  IVPB 750 milliGRAM(s) IV Intermittent every 12 hours      MEDICATIONS  (PRN):  HYDROmorphone  Injectable 0.5 milliGRAM(s) IV Push every 3 hours PRN pain       ---___---___---___---___---___---     <<<REVIEW OF SYSTEM: >>>    GEN: no fever, no chills, no pain  RESP: no SOB, no cough, no sputum  CVS: no chest pain, no palpitations, no edema  GI: no abdominal pain, no nausea, no vomiting, no constipation, no diarrhea  : no dysurea, no frequency  NEURO: no headache, no dizziness  PSYCH: no depression, not anxious  Derm : no itching, no rash     ---___---___---___---___---___---          <<<  VITAL SIGNS: >>>    T(F): 97.6 (18 @ 03:00), Max: 98.8 (18 @ 12:00)  HR: 93 (18 @ 07:00) (69 - 102)  BP: 107/68 (18 @ 19:00) (102/56 - 107/68)  RR: 22 (18 @ 07:00) (10 - 38)  SpO2: 85% (18 @ 07:00) (56% - 100%)  Wt(kg): --  CAPILLARY BLOOD GLUCOSE      POCT Blood Glucose.: 117 mg/dL (2018 05:24)    I&O's Summary    2018 07:01  -  2018 07:00  --------------------------------------------------------  IN: 4256.6 mL / OUT: 750 mL / NET: 3506.6 mL         ---___---___---___---___---___---                       PHYSICAL EXAM:    GEN: A&O X 2, NAD , comfortable  HEENT: NCAT, PERRL, MMM, no scleral icterus, hearing intact  NECK: Supple, No JVD  CVS: S1S2 , regular , No M/R/G appreciated  PULM: CTA B/L,  no W/R/R appreciated  ABD.: soft. non tender, non distended,  bowel sounds present  Extrem: intact pulses , no edema notedright hip draining   Derm: No rash or ecchymosis noted  PSYCH: normal mood, no depression, not anxious     ---___---___---___---___---___---     <<<  LAB AND IMAGING: >>>                          8.0    13.7  )-----------( 192      ( 2018 02:58 )             24.4               -    129<L>  |  104  |  32<H>  ----------------------------<  171<H>  4.5   |  15<L>  |  0.76    Ca    7.6<L>      2018 02:58  Phos  3.5       Mg     2.1         TPro  5.0<L>  /  Alb  2.9<L>  /  TBili  0.7  /  DBili  0.3<H>  /  AST  47<H>  /  ALT  36  /  AlkPhos  209<H>      PT/INR - ( 2018 02:58 )   PT: 12.0 sec;   INR: 1.10 ratio         PTT - ( 2018 02:58 )  PTT:32.3 sec       Urinalysis Basic - ( 2018 10:00 )    Color: Yellow / Appearance: Clear / S.024 / pH: x  Gluc: x / Ketone: Negative  / Bili: Negative / Urobili: Negative   Blood: x / Protein: 30 mg/dL / Nitrite: Negative   Leuk Esterase: Negative / RBC: 3-5 /HPF / WBC 3-5 /HPF   Sq Epi: x / Non Sq Epi: OCC /HPF / Bacteria: Few /HPF                ABG - ( 2018 02:49 )  pH, Arterial: 7.41  pH, Blood: x     /  pCO2: 26    /  pO2: 142   / HCO3: 16    / Base Excess: -7.6  /  SaO2: 100                     [All pertinent / recent available Imaging reports and other labs reviewed]     ---___---___---___---___---___---___ ---___---___---___---___---           <<<  A S S E S S M E N T   A N D   P L A N :  >>>          -GI/DVT Prophylaxis.    --------------------------------------------       >>______________________<<      Deniz Bolden .         phone   [phone]

## 2018-07-29 NOTE — PROGRESS NOTE ADULT - SUBJECTIVE AND OBJECTIVE BOX
[facility] DIVISION OF KIDNEY DISEASES AND HYPERTENSION -- FOLLOW UP NOTE  --------------------------------------------------------------------------------  Chief Complaint:Hyponatremia    24 hour events/subjective:  None  +Blood cultures for staph        PAST HISTORY  --------------------------------------------------------------------------------  No significant changes to PMH, PSH, FHx, SHx, unless otherwise noted    ALLERGIES & MEDICATIONS  --------------------------------------------------------------------------------  Allergies    ASA; dye contrast (Anaphylaxis)  aspirin (Short breath)  divalproex sodium (Other (Unknown))  Haldol (Other (Unknown))  penicillin (Short breath; Rash)  sulfa drugs (Short breath; Rash)  Xanax (Other (Unknown))    Intolerances      Standing Inpatient Medications  buDESOnide   0.5 milliGRAM(s) Respule 0.5 milliGRAM(s) Inhalation every 12 hours  chlorhexidine 0.12% Liquid 15 milliLiter(s) Swish and Spit <User Schedule>  chlorhexidine 4% Liquid 1 Application(s) Topical <User Schedule>  dexmedetomidine Infusion 0.3 MICROgram(s)/kG/Hr IV Continuous <Continuous>  DOPamine Infusion 5 MICROgram(s)/kG/Min IV Continuous <Continuous>  enoxaparin Injectable 40 milliGRAM(s) SubCutaneous every 24 hours  hydrocortisone sodium succinate Injectable 50 milliGRAM(s) IV Push every 6 hours  insulin lispro (HumaLOG) corrective regimen sliding scale   SubCutaneous every 4 hours  pantoprazole  Injectable 40 milliGRAM(s) IV Push daily  sodium chloride 0.9%. 1000 milliLiter(s) IV Continuous <Continuous>  vancomycin  IVPB 750 milliGRAM(s) IV Intermittent every 12 hours    PRN Inpatient Medications  HYDROmorphone  Injectable 0.5 milliGRAM(s) IV Push every 2 hours PRN  LORazepam   Injectable 1 milliGRAM(s) IV Push every 3 hours PRN      REVIEW OF SYSTEMS  --------------------------------------------------------------------------------  unable to obtain    All other systems were reviewed and are negative, except as noted.    VITALS/PHYSICAL EXAM  --------------------------------------------------------------------------------  T(C): 37.2 (07-29-18 @ 07:00), Max: 37.2 (07-29-18 @ 07:00)  HR: 97 (07-29-18 @ 10:30) (69 - 102)  BP: 134/105 (07-29-18 @ 07:30) (107/68 - 134/105)  RR: 21 (07-29-18 @ 10:30) (10 - 38)  SpO2: 100% (07-29-18 @ 10:30) (56% - 100%)  Wt(kg): --        07-28-18 @ 07:01  -  07-29-18 @ 07:00  --------------------------------------------------------  IN: 4256.6 mL / OUT: 750 mL / NET: 3506.6 mL    07-29-18 @ 07:01  -  07-29-18 @ 10:50  --------------------------------------------------------  IN: 378.6 mL / OUT: 105 mL / NET: 273.6 mL      Physical Exam:  Gen: sedated, intubated  HEENT: +intubation  Pulm: air entry present Bl  CV: RRR, S1S2; no rub  Abd: +BS, soft, mild discomfort on palpation  LE: Warm, trace edema, +rt leg drains +         LABS/STUDIES  --------------------------------------------------------------------------------              8.0    13.7  >-----------<  192      [07-29-18 @ 02:58]              24.4     129  |  104  |  32  ----------------------------<  171      [07-29-18 @ 02:58]  4.5   |  15  |  0.76        Ca     7.6     [07-29-18 @ 02:58]      Mg     2.1     [07-29-18 @ 02:58]      Phos  3.5     [07-29-18 @ 02:58]    TPro  5.0  /  Alb  2.9  /  TBili  0.7  /  DBili  0.3  /  AST  47  /  ALT  36  /  AlkPhos  209  [07-29-18 @ 02:58]    PT/INR: PT 12.0 , INR 1.10       [07-29-18 @ 02:58]  PTT: 32.3       [07-29-18 @ 02:58]      Creatinine Trend:  SCr 0.76 [07-29 @ 02:58]  SCr 0.80 [07-28 @ 22:13]  SCr 0.70 [07-28 @ 13:38]  SCr 0.73 [07-28 @ 05:54]  SCr 0.79 [07-28 @ 01:01]    Urinalysis - [07-28-18 @ 10:00]      Color Yellow / Appearance Clear / SG 1.024 / pH 6.0      Gluc 150 / Ketone Negative  / Bili Negative / Urobili Negative       Blood Trace / Protein 30 / Leuk Est Negative / Nitrite Negative      RBC 3-5 / WBC 3-5 / Hyaline 2-5 / Gran  / Sq Epi  / Non Sq Epi OCC / Bacteria Few    Urine Creatinine 81      [07-27-18 @ 04:56]  Urine Sodium 23      [07-28-18 @ 10:03]  Urine Potassium 68      [07-28-18 @ 10:03]  Urine Chloride 79      [07-28-18 @ 10:03]  Urine Osmolality 298      [07-27-18 @ 08:12]    HbA1c 5.5      [07-27-18 @ 09:02]  TSH 0.95      [07-27-18 @ 09:02]

## 2018-07-29 NOTE — PROGRESS NOTE ADULT - SUBJECTIVE AND OBJECTIVE BOX
Ortho Progress Note    S: Patient seen and examined. Pressors changed overnight and urine output increased. Decreased sedation. On SBT, likely to be extubated this am.       O:  Physical Exam:  Gen: Laying in bed, NAD, alert, follows simple commands.   Resp: intubated  BLE: EHL/FHL/TA/Sol intact          sensation: difficult to assess due to intubation          +DP, extremity WWP    Vital Signs Last 24 Hrs  T(C): 37.2 (29 Jul 2018 07:00), Max: 37.2 (29 Jul 2018 07:00)  T(F): 98.9 (29 Jul 2018 07:00), Max: 98.9 (29 Jul 2018 07:00)  HR: 99 (29 Jul 2018 08:15) (69 - 102)  BP: 134/105 (29 Jul 2018 07:30) (107/68 - 134/105)  BP(mean): 110 (29 Jul 2018 07:30) (82 - 110)  RR: 32 (29 Jul 2018 08:15) (10 - 38)  SpO2: 100% (29 Jul 2018 08:15) (56% - 100%)                          8.0    13.7  )-----------( 192      ( 29 Jul 2018 02:58 )             24.4                         8.1    11.3  )-----------( 171      ( 28 Jul 2018 22:13 )             24.4       07-29    129<L>  |  104  |  32<H>  ----------------------------<  171<H>  4.5   |  15<L>  |  0.76        PT/INR - ( 29 Jul 2018 02:58 )   PT: 12.0 sec;   INR: 1.10 ratio         PTT - ( 29 Jul 2018 02:58 )  PTT:32.3 sec          Culture - Blood (collected 28 Jul 2018 05:27)  Source: .Blood Blood  Gram Stain (29 Jul 2018 01:02):    Growth in aerobic bottle: Gram Positive Cocci in Clusters    Growth in anaerobic bottle: Gram Positive Cocci in Clusters  Preliminary Report (29 Jul 2018 01:02):    Growth in aerobic bottle: Gram Positive Cocci in Clusters    Growth in anaerobic bottle: Gram Positive Cocci in Clusters    Culture - Blood (collected 28 Jul 2018 05:27)  Source: .Blood Blood  Gram Stain (29 Jul 2018 02:11):    Growth in aerobic bottle: Gram Positive Cocci in Clusters    Growth in anaerobic bottle: Gram Positive Cocci in Clusters  Preliminary Report (29 Jul 2018 02:11):    Growth in aerobic bottle: Gram Positive Cocci in Clusters    Growth in anaerobic bottle: Gram Positive Cocci in Clusters    Culture - Tissue with Gram Stain (collected 27 Jul 2018 22:54)  Source: .Tissue 1 right hip fluid superficial  Gram Stain (28 Jul 2018 02:35):    No polymorphonuclear cells seen per low power field    Rare Gram variable coccobacilli per oil power field  Preliminary Report (28 Jul 2018 18:41):    Moderate Staphylococcus aureus    Culture - Surgical Swab (collected 27 Jul 2018 22:34)  Source: .Surgical Swab right hip fluid superficial  Preliminary Report (28 Jul 2018 18:26):    Moderate Staphylococcus aureus    Culture - Surgical Swab (collected 27 Jul 2018 22:34)  Source: .Surgical Swab right hip fluid deep  Preliminary Report (28 Jul 2018 17:57):    Moderate Staphylococcus aureus    Culture - Surgical Swab (collected 27 Jul 2018 22:34)  Source: .Surgical Swab right hip fluid deep  Preliminary Report (28 Jul 2018 18:15):    Moderate Staphylococcus aureus    See previous culture [id number]    Culture - Tissue with Gram Stain (collected 27 Jul 2018 21:26)  Source: .Tissue Other, 2 right hip tissue  Gram Stain (28 Jul 2018 02:38):    No polymorphonuclear cells seen per low power field    No organisms seen per oil power field  Preliminary Report (28 Jul 2018 16:14):    Few Staphylococcus aureus    Culture - Acid Fast - Tissue w/Smear (collected 27 Jul 2018 21:26)  Source: .Tissue Other, 2 right hip tissue    Culture - Tissue with Gram Stain (collected 27 Jul 2018 21:22)  Source: .Tissue Other, right hip soft tissue  Gram Stain (28 Jul 2018 02:38):    No polymorphonuclear cells seen per low power field    No organisms seen per oil power field  Preliminary Report (28 Jul 2018 16:17):    Few Staphylococcus aureus    Culture - Blood (collected 27 Jul 2018 08:24)  Source: .Blood Blood  Gram Stain (28 Jul 2018 01:37):    Growth in anaerobic bottle: Gram Positive Cocci in Clusters    Growth in aerobic bottle: Gram Positive Cocci in Clusters  Preliminary Report (28 Jul 2018 18:31):    Growth in aerobic and anaerobic bottles: Staphylococcus aureus    See previous culture [id number]    Culture - Urine (collected 26 Jul 2018 16:29)  Source: .Urine Catheterized  Final Report (27 Jul 2018 21:44):    No growth    Culture - Blood (collected 26 Jul 2018 14:43)  Source: .Blood Blood-Peripheral  Gram Stain (27 Jul 2018 11:00):    Growth in aerobic and anaerobic bottles: Gram Positive Cocci in Clusters  Preliminary Report (28 Jul 2018 10:57):    Growth in aerobic and anaerobic bottles:    Staphylococcus aureus Susceptibility to follow.    "Due to technical problems, Proteus sp. will Not be reported as part of    the BCID panel until further notice"    ***Blood Panel PCR results on this specimen are available    approximately 3 hours after the Gram stain result.***    Gram stain, PCR, and/or culture results may not always    correspond due to difference in methodologies.    ************************************************************    This PCR assay was performed using [employer].    The following targets are tested for: Enterococcus,    vancomycin resistant enterococci, Listeria monocytogenes,    coagulase negative staphylococci, S. aureus,    methicillin resistant S. aureus, Streptococcus agalactiae    (Group B), S. pneumoniae, S. pyogenes (Group A),    Acinetobacter baumannii, Enterobacter cloacae, E. coli,    Klebsiella oxytoca, K. pneumoniae, Proteus sp.,    Serratia marcescens, Haemophilus influenzae,    Neisseria meningitidis, Pseudomonas aeruginosa, Candida    albicans, C. glabrata, C krusei, C parapsilosis,    C. tropicalis and the KPC resistance gene.  Organism: Blood Culture PCR (27 Jul 2018 06:03)  Organism: Blood Culture PCR (27 Jul 2018 06:03)    Culture - Blood (collected 26 Jul 2018 14:43)  Source: .Blood Blood-Peripheral  Gram Stain (27 Jul 2018 08:32):    Growth in aerobic and anaerobic bottles: Gram Positive Cocci in Clusters  Preliminary Report (28 Jul 2018 11:04):    Growth in aerobic and anaerobic bottles:    Staphylococcus aureus    See previous culture [id number]

## 2018-07-29 NOTE — PROGRESS NOTE ADULT - ATTENDING COMMENTS
68F pmhx of asthma, Alzheimer's anxiety/depression, HLD, UC, HTN, S/P right hip fracture s/p hip hemiarthroplasty after a fracture 1month ago, presented with weakness, worsening mentation, found to have sepsis, hyponatremia     Hyponatremia: Likely multifactorial  Na is lower  Please check Plasma and urine osmolarity  Would repeat Na at noon, if any lower will have to start 2% saline  If possible give Vancomycin in NS and not D5W    Metabolic acidosis: non gap with a positive urine gap  Arterial PH is ok due to respiratory compensation    Check PIOTR, Complements, SIFE/Immunoglobulin light chains, Hg electrophoresis, Sjogrens antibodies

## 2018-07-29 NOTE — PROGRESS NOTE ADULT - ASSESSMENT
A/P: 68y Female s/p R Irrigation and debridement of hip & exchange of femoral head component    -Pain Control  -DVT PPx  -ID: follow cultures, c/w antibiotics, FU ID  -PT Eval when appropriate- WBAT RLE, flat foot weight bearing LLE  -Continue current tx; Appreciate SICU care    Ortho [id number] A/P: 68y Female s/p R Irrigation and debridement of hip & exchange of femoral head component    -If patient fails to improve appropriately will consider explant of hardware this week  -Pain Control  -DVT PPx  -ID: follow cultures, c/w antibiotics, FU ID  -PT Eval when appropriate- WBAT RLE, flat foot weight bearing LLE  -Continue current tx; Appreciate SICU care    Ortho [id number]

## 2018-07-29 NOTE — PROGRESS NOTE ADULT - SUBJECTIVE AND OBJECTIVE BOX
CC: f/u for high grade MRSA bacteremia, severe sepsis    Pt remains sedated on Vent, now on Dopa for decreased U/O, off pressors    REVIEW OF SYSTEMS:  Not proviede on Vent    Antimicrobials Day # 4  Medications Reviewed    ICU Vital Signs Last 24 Hrs  T(F): 98.7 (29 Jul 2018 15:00), Max: 99.1 (29 Jul 2018 11:00)  HR: 94 (29 Jul 2018 15:00) (74 - 102)  BP: 134/105 (29 Jul 2018 07:30) (107/68 - 134/105)  BP(mean): 110 (29 Jul 2018 07:30) (82 - 110)  ABP: 134/76 (29 Jul 2018 15:00) (95/57 - 173/92)  ABP(mean): 101 (29 Jul 2018 15:00) (72 - 132)  RR: 14 (29 Jul 2018 15:00) (10 - 38)  SpO2: 100% (29 Jul 2018 15:00) (56% - 100%)    PHYSICAL EXAM:  General: no acute distress on Vent  Eyes:  anicteric, no conjunctival injection, no discharge  Oropharynx: ETT	  Neck: LIJ CVL  Lungs: clear to auscultation  Heart: regular rate and rhythm; no murmur, rubs or gallops  Abdomen: soft, nondistended, nontender, without mass or organomegaly  Blackman  Skin: R hip incision dressing intact; hemovac x 2  Extremities: no edema  Neurologic: sedated    LAB RESULTS:                        7.8    12.5  )-----------( 184      ( 29 Jul 2018 13:37 )             23.4   07-29    133<L>  |  104  |  36<H>  ----------------------------<  162<H>  3.9   |  14<L>  |  0.73    Ca    7.6<L>      29 Jul 2018 13:37  Phos  3.3     07-29  Mg     2.0     07-29    TPro  5.0<L>  /  Alb  2.9<L>  /  TBili  0.7  /  DBili  0.3<H>  /  AST  47<H>  /  ALT  36  /  AlkPhos  209<H>  07-29    MICROBIOLOGY:  RECENT CULTURES:  Culture - Tissue with Gram Stain (07.27.18 @ 22:54)    Gram Stain:   No polymorphonuclear cells seen per low power field  Rare Gram variable coccobacilli per oil power field    Specimen Source: .Tissue 1 right hip fluid superficial    Culture Results:   Moderate Staphylococcus aureus    Culture - Blood in AM (07.28.18 @ 05:27)    Gram Stain:   Growth in aerobic bottle: Gram Positive Cocci in Clusters  Growth in anaerobic bottle: Gram Positive Cocci in Clusters    Specimen Source: .Blood Blood    Culture Results:   Growth in aerobic bottle: Gram Positive Cocci in Clusters  Growth in anaerobic bottle: Gram Positive Cocci in Clusters    Culture - Blood (07.27.18 @ 08:24)    Gram Stain:   Growth in anaerobic bottle: Gram Positive Cocci in Clusters  Growth in aerobic bottle: Gram Positive Cocci in Clusters    Specimen Source: .Blood Blood    Culture Results:   Growth in anaerobic bottle: Gram Positive Cocci in Clusters  Growth in aerobic bottle: Gram Positive Cocci in Clusters    Culture - Blood (07.26.18 @ 14:43)    -  Methicillin resistant Staphylococcus aureus (MRSA): Detec    Gram Stain:   Growth in aerobic and anaerobic bottles: Gram Positive Cocci in Clusters           RADIOLOGY REVIEWED:  Xray Chest 1 View- PORTABLE-Routine (07.29.18 @ 06:58) >  Clear lungs.    CT Abdomen and Pelvis No Cont (07.26.18 @ 17:19) >  A 17.0 cm collection along the right lateral thigh musculature adjacent   to the right hip prosthesis. Superimposed infection is not excluded.     New compression fracture of the L2 vertebral body as above. Correlate   with pending lumbar spine MRI.    Mildly depressed acute fracture of the sternal manubrium.    Acute comminuted fracture of the left acetabulum.

## 2018-07-29 NOTE — PROGRESS NOTE ADULT - ASSESSMENT
67 yo female with dementia, history of UC, and s/p RT Hip hemiarthroplasty 6/22/18  Known post op hematoma  Here with low grade fever, rigors, increased confusion, found to have leukocytosis, 20% bands, severe sepsis- ICU, pressor use   Bld Cxs 7/26-7/28 all MRSA  POD # 2- R hip I&D, lavage, poly exchange- pus encountered, all specimens S aureus  Remains on Vent, now off pressors   Afebrile, Creat stable  Bld Cxs yest AM still +  L hip Fx noted    Plan:  Although source control still may be primary issue, with + Bld Cxs obtained post op, will switch to combo Tx with Dapto and Ceftaroline- approach as "complicated" bacteremia; could even be faced with secondary endocarditis  Rifampin might be an option once bldstream cleared  D/c Vanco  Repeat Bld Cxs in AM  Favor TTE, possibly GAYATHRI  Weaning as tolerated  Hold any intervention for L hip until we've established control of MRSA  D/w Dr Altman and SICU team  D/w  at length  Status guarded  > 35 mins spent in direct assessment of the patient, critical care, analysis of all pertinent data, discussion, counseling, and coordination of care; benefit, possible adverse effects, and limitations of antibiotics reviewed- all represents complex medical decision making

## 2018-07-30 ENCOUNTER — APPOINTMENT (OUTPATIENT)
Dept: PULMONOLOGY | Facility: CLINIC | Age: 68
End: 2018-07-30

## 2018-07-30 DIAGNOSIS — E87.6 HYPOKALEMIA: ICD-10-CM

## 2018-07-30 DIAGNOSIS — E87.2 ACIDOSIS: ICD-10-CM

## 2018-07-30 LAB
ANION GAP SERPL CALC-SCNC: 13 MMOL/L — SIGNIFICANT CHANGE UP (ref 5–17)
ANION GAP SERPL CALC-SCNC: 18 MMOL/L — HIGH (ref 5–17)
APTT BLD: 29.2 SEC — SIGNIFICANT CHANGE UP (ref 27.5–37.4)
BASE EXCESS BLDV CALC-SCNC: -7.3 MMOL/L — LOW (ref -2–2)
BUN SERPL-MCNC: 38 MG/DL — HIGH (ref 7–23)
BUN SERPL-MCNC: 40 MG/DL — HIGH (ref 7–23)
CA-I BLD-SCNC: 1.14 MMOL/L — SIGNIFICANT CHANGE UP (ref 1.12–1.3)
CA-I SERPL-SCNC: 1.17 MMOL/L — SIGNIFICANT CHANGE UP (ref 1.12–1.3)
CALCIUM SERPL-MCNC: 7.3 MG/DL — LOW (ref 8.4–10.5)
CALCIUM SERPL-MCNC: 7.7 MG/DL — LOW (ref 8.4–10.5)
CHLORIDE BLDV-SCNC: 109 MMOL/L — HIGH (ref 96–108)
CHLORIDE SERPL-SCNC: 105 MMOL/L — SIGNIFICANT CHANGE UP (ref 96–108)
CHLORIDE SERPL-SCNC: 106 MMOL/L — SIGNIFICANT CHANGE UP (ref 96–108)
CO2 BLDV-SCNC: 18 MMOL/L — LOW (ref 22–30)
CO2 SERPL-SCNC: 13 MMOL/L — LOW (ref 22–31)
CO2 SERPL-SCNC: 14 MMOL/L — LOW (ref 22–31)
CREAT SERPL-MCNC: 0.72 MG/DL — SIGNIFICANT CHANGE UP (ref 0.5–1.3)
CREAT SERPL-MCNC: 0.77 MG/DL — SIGNIFICANT CHANGE UP (ref 0.5–1.3)
CULTURE RESULTS: SIGNIFICANT CHANGE UP
DSDNA AB FLD-ACNC: <0.2 AI — SIGNIFICANT CHANGE UP
ENA SS-A AB FLD IA-ACNC: <0.2 AI — SIGNIFICANT CHANGE UP
GAS PNL BLDA: SIGNIFICANT CHANGE UP
GAS PNL BLDV: 132 MMOL/L — LOW (ref 136–145)
GAS PNL BLDV: SIGNIFICANT CHANGE UP
GAS PNL BLDV: SIGNIFICANT CHANGE UP
GLUCOSE BLDV-MCNC: 187 MG/DL — HIGH (ref 70–99)
GLUCOSE SERPL-MCNC: 228 MG/DL — HIGH (ref 70–99)
GLUCOSE SERPL-MCNC: 240 MG/DL — HIGH (ref 70–99)
GRAM STN FLD: SIGNIFICANT CHANGE UP
HCO3 BLDV-SCNC: 17 MMOL/L — LOW (ref 21–29)
HCT VFR BLD CALC: 23 % — LOW (ref 34.5–45)
HCT VFR BLD CALC: 23.6 % — LOW (ref 34.5–45)
HCT VFR BLDA CALC: 23 % — LOW (ref 39–50)
HGB BLD CALC-MCNC: 7.5 G/DL — LOW (ref 11.5–15.5)
HGB BLD-MCNC: 7.5 G/DL — LOW (ref 11.5–15.5)
HGB BLD-MCNC: 7.6 G/DL — LOW (ref 11.5–15.5)
HOROWITZ INDEX BLDV+IHG-RTO: 30 — SIGNIFICANT CHANGE UP
INR BLD: 1.14 RATIO — SIGNIFICANT CHANGE UP (ref 0.88–1.16)
KAPPA LC SER QL IFE: 0.9 MG/DL — SIGNIFICANT CHANGE UP (ref 0.33–1.94)
KAPPA/LAMBDA FREE LIGHT CHAIN RATIO, SERUM: 0.77 RATIO — SIGNIFICANT CHANGE UP (ref 0.26–1.65)
LACTATE BLDV-MCNC: 1.2 MMOL/L — SIGNIFICANT CHANGE UP (ref 0.7–2)
LAMBDA LC SER QL IFE: 1.17 MG/DL — SIGNIFICANT CHANGE UP (ref 0.57–2.63)
MAGNESIUM SERPL-MCNC: 1.8 MG/DL — SIGNIFICANT CHANGE UP (ref 1.6–2.6)
MAGNESIUM SERPL-MCNC: 2 MG/DL — SIGNIFICANT CHANGE UP (ref 1.6–2.6)
MAGNESIUM SERPL-MCNC: 2 MG/DL — SIGNIFICANT CHANGE UP (ref 1.6–2.6)
MCHC RBC-ENTMCNC: 27.6 PG — SIGNIFICANT CHANGE UP (ref 27–34)
MCHC RBC-ENTMCNC: 28.6 PG — SIGNIFICANT CHANGE UP (ref 27–34)
MCHC RBC-ENTMCNC: 31.7 GM/DL — LOW (ref 32–36)
MCHC RBC-ENTMCNC: 33 GM/DL — SIGNIFICANT CHANGE UP (ref 32–36)
MCV RBC AUTO: 86.6 FL — SIGNIFICANT CHANGE UP (ref 80–100)
MCV RBC AUTO: 87.1 FL — SIGNIFICANT CHANGE UP (ref 80–100)
OTHER CELLS CSF MANUAL: 6 ML/DL — LOW (ref 18–22)
PCO2 BLDV: 32 MMHG — LOW (ref 35–50)
PH BLDV: 7.34 — LOW (ref 7.35–7.45)
PHOSPHATE SERPL-MCNC: 2.6 MG/DL — SIGNIFICANT CHANGE UP (ref 2.5–4.5)
PHOSPHATE SERPL-MCNC: 2.9 MG/DL — SIGNIFICANT CHANGE UP (ref 2.5–4.5)
PHOSPHATE SERPL-MCNC: 3.3 MG/DL — SIGNIFICANT CHANGE UP (ref 2.5–4.5)
PLATELET # BLD AUTO: 153 K/UL — SIGNIFICANT CHANGE UP (ref 150–400)
PLATELET # BLD AUTO: 190 K/UL — SIGNIFICANT CHANGE UP (ref 150–400)
PO2 BLDV: 35 MMHG — SIGNIFICANT CHANGE UP (ref 25–45)
POTASSIUM BLDV-SCNC: 4.6 MMOL/L — SIGNIFICANT CHANGE UP (ref 3.5–5.3)
POTASSIUM SERPL-MCNC: 3 MMOL/L — LOW (ref 3.5–5.3)
POTASSIUM SERPL-MCNC: 4.2 MMOL/L — SIGNIFICANT CHANGE UP (ref 3.5–5.3)
POTASSIUM SERPL-SCNC: 3 MMOL/L — LOW (ref 3.5–5.3)
POTASSIUM SERPL-SCNC: 4.2 MMOL/L — SIGNIFICANT CHANGE UP (ref 3.5–5.3)
PROTHROM AB SERPL-ACNC: 12.4 SEC — SIGNIFICANT CHANGE UP (ref 9.8–12.7)
RBC # BLD: 2.66 M/UL — LOW (ref 3.8–5.2)
RBC # BLD: 2.72 M/UL — LOW (ref 3.8–5.2)
RBC # FLD: 17.9 % — HIGH (ref 10.3–14.5)
RBC # FLD: 17.9 % — HIGH (ref 10.3–14.5)
SAO2 % BLDV: 62 % — LOW (ref 67–88)
SODIUM SERPL-SCNC: 132 MMOL/L — LOW (ref 135–145)
SODIUM SERPL-SCNC: 137 MMOL/L — SIGNIFICANT CHANGE UP (ref 135–145)
SPECIMEN SOURCE: SIGNIFICANT CHANGE UP
WBC # BLD: 8.6 K/UL — SIGNIFICANT CHANGE UP (ref 3.8–10.5)
WBC # BLD: 9.1 K/UL — SIGNIFICANT CHANGE UP (ref 3.8–10.5)
WBC # FLD AUTO: 8.6 K/UL — SIGNIFICANT CHANGE UP (ref 3.8–10.5)
WBC # FLD AUTO: 9.1 K/UL — SIGNIFICANT CHANGE UP (ref 3.8–10.5)

## 2018-07-30 PROCEDURE — 93312 ECHO TRANSESOPHAGEAL: CPT | Mod: 26

## 2018-07-30 PROCEDURE — 93306 TTE W/DOPPLER COMPLETE: CPT | Mod: 26

## 2018-07-30 PROCEDURE — 71045 X-RAY EXAM CHEST 1 VIEW: CPT | Mod: 26

## 2018-07-30 PROCEDURE — 99232 SBSQ HOSP IP/OBS MODERATE 35: CPT | Mod: GC

## 2018-07-30 PROCEDURE — 99291 CRITICAL CARE FIRST HOUR: CPT

## 2018-07-30 RX ORDER — MAGNESIUM SULFATE 500 MG/ML
2 VIAL (ML) INJECTION ONCE
Qty: 0 | Refills: 0 | Status: COMPLETED | OUTPATIENT
Start: 2018-07-30 | End: 2018-07-30

## 2018-07-30 RX ORDER — PANTOPRAZOLE SODIUM 20 MG/1
40 TABLET, DELAYED RELEASE ORAL
Qty: 0 | Refills: 0 | Status: DISCONTINUED | OUTPATIENT
Start: 2018-07-30 | End: 2018-07-30

## 2018-07-30 RX ORDER — SENNA PLUS 8.6 MG/1
2 TABLET ORAL AT BEDTIME
Qty: 0 | Refills: 0 | Status: DISCONTINUED | OUTPATIENT
Start: 2018-07-30 | End: 2018-08-03

## 2018-07-30 RX ORDER — POTASSIUM CHLORIDE 20 MEQ
20 PACKET (EA) ORAL ONCE
Qty: 0 | Refills: 0 | Status: COMPLETED | OUTPATIENT
Start: 2018-07-30 | End: 2018-07-30

## 2018-07-30 RX ORDER — PROPOFOL 10 MG/ML
5 INJECTION, EMULSION INTRAVENOUS
Qty: 500 | Refills: 0 | Status: DISCONTINUED | OUTPATIENT
Start: 2018-07-30 | End: 2018-07-30

## 2018-07-30 RX ORDER — HYDROCORTISONE 20 MG
50 TABLET ORAL EVERY 12 HOURS
Qty: 0 | Refills: 0 | Status: COMPLETED | OUTPATIENT
Start: 2018-07-31 | End: 2018-08-01

## 2018-07-30 RX ORDER — MIRTAZAPINE 45 MG/1
45 TABLET, ORALLY DISINTEGRATING ORAL
Qty: 0 | Refills: 0 | Status: DISCONTINUED | OUTPATIENT
Start: 2018-07-30 | End: 2018-07-30

## 2018-07-30 RX ORDER — DIPHENHYDRAMINE HYDROCHLORIDE AND LIDOCAINE HYDROCHLORIDE AND ALUMINUM HYDROXIDE AND MAGNESIUM HYDRO
5 KIT DAILY
Qty: 0 | Refills: 0 | Status: DISCONTINUED | OUTPATIENT
Start: 2018-07-30 | End: 2018-08-03

## 2018-07-30 RX ORDER — TRAMADOL HYDROCHLORIDE 50 MG/1
50 TABLET ORAL EVERY 6 HOURS
Qty: 0 | Refills: 0 | Status: DISCONTINUED | OUTPATIENT
Start: 2018-07-30 | End: 2018-07-31

## 2018-07-30 RX ORDER — QUETIAPINE FUMARATE 200 MG/1
50 TABLET, FILM COATED ORAL
Qty: 0 | Refills: 0 | Status: DISCONTINUED | OUTPATIENT
Start: 2018-07-30 | End: 2018-07-30

## 2018-07-30 RX ORDER — HYDROCORTISONE 20 MG
TABLET ORAL
Qty: 0 | Refills: 0 | Status: COMPLETED | OUTPATIENT
Start: 2018-07-31 | End: 2018-08-02

## 2018-07-30 RX ORDER — QUETIAPINE FUMARATE 200 MG/1
50 TABLET, FILM COATED ORAL
Qty: 0 | Refills: 0 | Status: DISCONTINUED | OUTPATIENT
Start: 2018-07-30 | End: 2018-08-03

## 2018-07-30 RX ORDER — SERTRALINE 25 MG/1
50 TABLET, FILM COATED ORAL DAILY
Qty: 0 | Refills: 0 | Status: DISCONTINUED | OUTPATIENT
Start: 2018-07-30 | End: 2018-08-03

## 2018-07-30 RX ORDER — HYDROCORTISONE 20 MG
50 TABLET ORAL EVERY 8 HOURS
Qty: 0 | Refills: 0 | Status: COMPLETED | OUTPATIENT
Start: 2018-07-31 | End: 2018-07-31

## 2018-07-30 RX ORDER — SODIUM CHLORIDE 9 MG/ML
1000 INJECTION, SOLUTION INTRAVENOUS
Qty: 0 | Refills: 0 | Status: DISCONTINUED | OUTPATIENT
Start: 2018-07-30 | End: 2018-07-31

## 2018-07-30 RX ORDER — INSULIN LISPRO 100/ML
VIAL (ML) SUBCUTANEOUS EVERY 4 HOURS
Qty: 0 | Refills: 0 | Status: DISCONTINUED | OUTPATIENT
Start: 2018-07-30 | End: 2018-07-30

## 2018-07-30 RX ORDER — HYDROCORTISONE 20 MG
TABLET ORAL
Qty: 0 | Refills: 0 | Status: DISCONTINUED | OUTPATIENT
Start: 2018-07-30 | End: 2018-07-30

## 2018-07-30 RX ORDER — HYDROCORTISONE 20 MG
50 TABLET ORAL EVERY 8 HOURS
Qty: 0 | Refills: 0 | Status: DISCONTINUED | OUTPATIENT
Start: 2018-07-30 | End: 2018-07-30

## 2018-07-30 RX ORDER — SODIUM CHLORIDE 9 MG/ML
1000 INJECTION, SOLUTION INTRAVENOUS
Qty: 0 | Refills: 0 | Status: DISCONTINUED | OUTPATIENT
Start: 2018-07-30 | End: 2018-07-30

## 2018-07-30 RX ORDER — IPRATROPIUM/ALBUTEROL SULFATE 18-103MCG
3 AEROSOL WITH ADAPTER (GRAM) INHALATION EVERY 6 HOURS
Qty: 0 | Refills: 0 | Status: DISCONTINUED | OUTPATIENT
Start: 2018-07-30 | End: 2018-08-02

## 2018-07-30 RX ORDER — ACETAMINOPHEN 500 MG
975 TABLET ORAL EVERY 6 HOURS
Qty: 0 | Refills: 0 | Status: DISCONTINUED | OUTPATIENT
Start: 2018-07-30 | End: 2018-07-31

## 2018-07-30 RX ORDER — POTASSIUM PHOSPHATE, MONOBASIC POTASSIUM PHOSPHATE, DIBASIC 236; 224 MG/ML; MG/ML
15 INJECTION, SOLUTION INTRAVENOUS ONCE
Qty: 0 | Refills: 0 | Status: COMPLETED | OUTPATIENT
Start: 2018-07-30 | End: 2018-07-30

## 2018-07-30 RX ORDER — HYDROCORTISONE 20 MG
25 TABLET ORAL EVERY 12 HOURS
Qty: 0 | Refills: 0 | Status: COMPLETED | OUTPATIENT
Start: 2018-08-01 | End: 2018-08-02

## 2018-07-30 RX ORDER — POTASSIUM CHLORIDE 20 MEQ
40 PACKET (EA) ORAL EVERY 4 HOURS
Qty: 0 | Refills: 0 | Status: DISCONTINUED | OUTPATIENT
Start: 2018-07-30 | End: 2018-07-30

## 2018-07-30 RX ORDER — POTASSIUM CHLORIDE 20 MEQ
10 PACKET (EA) ORAL ONCE
Qty: 0 | Refills: 0 | Status: DISCONTINUED | OUTPATIENT
Start: 2018-07-30 | End: 2018-07-30

## 2018-07-30 RX ORDER — FAMOTIDINE 10 MG/ML
20 INJECTION INTRAVENOUS
Qty: 0 | Refills: 0 | Status: DISCONTINUED | OUTPATIENT
Start: 2018-07-30 | End: 2018-07-31

## 2018-07-30 RX ORDER — INSULIN LISPRO 100/ML
VIAL (ML) SUBCUTANEOUS AT BEDTIME
Qty: 0 | Refills: 0 | Status: DISCONTINUED | OUTPATIENT
Start: 2018-07-30 | End: 2018-07-31

## 2018-07-30 RX ORDER — DOCUSATE SODIUM 100 MG
100 CAPSULE ORAL THREE TIMES A DAY
Qty: 0 | Refills: 0 | Status: DISCONTINUED | OUTPATIENT
Start: 2018-07-30 | End: 2018-08-03

## 2018-07-30 RX ORDER — FUROSEMIDE 40 MG
20 TABLET ORAL ONCE
Qty: 0 | Refills: 0 | Status: COMPLETED | OUTPATIENT
Start: 2018-07-30 | End: 2018-07-30

## 2018-07-30 RX ORDER — INSULIN LISPRO 100/ML
VIAL (ML) SUBCUTANEOUS
Qty: 0 | Refills: 0 | Status: DISCONTINUED | OUTPATIENT
Start: 2018-07-30 | End: 2018-07-31

## 2018-07-30 RX ORDER — MIRTAZAPINE 45 MG/1
45 TABLET, ORALLY DISINTEGRATING ORAL
Qty: 0 | Refills: 0 | Status: DISCONTINUED | OUTPATIENT
Start: 2018-07-30 | End: 2018-08-03

## 2018-07-30 RX ORDER — MIRTAZAPINE 45 MG/1
15 TABLET, ORALLY DISINTEGRATING ORAL
Qty: 0 | Refills: 0 | Status: DISCONTINUED | OUTPATIENT
Start: 2018-07-30 | End: 2018-07-30

## 2018-07-30 RX ADMIN — Medication 100 MILLIEQUIVALENT(S): at 21:21

## 2018-07-30 RX ADMIN — HYDROMORPHONE HYDROCHLORIDE 0.5 MILLIGRAM(S): 2 INJECTION INTRAMUSCULAR; INTRAVENOUS; SUBCUTANEOUS at 08:30

## 2018-07-30 RX ADMIN — CEFTAROLINE FOSAMIL 50 MILLIGRAM(S): 600 POWDER, FOR SOLUTION INTRAVENOUS at 17:34

## 2018-07-30 RX ADMIN — HEPARIN SODIUM 5000 UNIT(S): 5000 INJECTION INTRAVENOUS; SUBCUTANEOUS at 05:29

## 2018-07-30 RX ADMIN — QUETIAPINE FUMARATE 50 MILLIGRAM(S): 200 TABLET, FILM COATED ORAL at 21:50

## 2018-07-30 RX ADMIN — Medication 0.5 MILLIGRAM(S): at 17:16

## 2018-07-30 RX ADMIN — Medication 50 MILLIGRAM(S): at 05:29

## 2018-07-30 RX ADMIN — Medication 50 MILLIEQUIVALENT(S): at 03:59

## 2018-07-30 RX ADMIN — HYDROMORPHONE HYDROCHLORIDE 0.5 MILLIGRAM(S): 2 INJECTION INTRAMUSCULAR; INTRAVENOUS; SUBCUTANEOUS at 04:12

## 2018-07-30 RX ADMIN — Medication 50 MILLIGRAM(S): at 00:04

## 2018-07-30 RX ADMIN — POTASSIUM PHOSPHATE, MONOBASIC POTASSIUM PHOSPHATE, DIBASIC 62.5 MILLIMOLE(S): 236; 224 INJECTION, SOLUTION INTRAVENOUS at 21:04

## 2018-07-30 RX ADMIN — Medication 20 MILLIGRAM(S): at 15:47

## 2018-07-30 RX ADMIN — HYDROMORPHONE HYDROCHLORIDE 0.5 MILLIGRAM(S): 2 INJECTION INTRAMUSCULAR; INTRAVENOUS; SUBCUTANEOUS at 08:45

## 2018-07-30 RX ADMIN — HYDROMORPHONE HYDROCHLORIDE 0.5 MILLIGRAM(S): 2 INJECTION INTRAMUSCULAR; INTRAVENOUS; SUBCUTANEOUS at 01:05

## 2018-07-30 RX ADMIN — Medication 1: at 10:30

## 2018-07-30 RX ADMIN — Medication 1 MILLIGRAM(S): at 02:31

## 2018-07-30 RX ADMIN — CHLORHEXIDINE GLUCONATE 15 MILLILITER(S): 213 SOLUTION TOPICAL at 05:29

## 2018-07-30 RX ADMIN — SERTRALINE 50 MILLIGRAM(S): 25 TABLET, FILM COATED ORAL at 17:34

## 2018-07-30 RX ADMIN — Medication 100 MILLIEQUIVALENT(S): at 23:29

## 2018-07-30 RX ADMIN — Medication 50 MILLIEQUIVALENT(S): at 00:09

## 2018-07-30 RX ADMIN — Medication 1: at 05:46

## 2018-07-30 RX ADMIN — Medication 50 GRAM(S): at 21:03

## 2018-07-30 RX ADMIN — MIRTAZAPINE 45 MILLIGRAM(S): 45 TABLET, ORALLY DISINTEGRATING ORAL at 21:50

## 2018-07-30 RX ADMIN — Medication 100 MILLIGRAM(S): at 21:04

## 2018-07-30 RX ADMIN — DEXMEDETOMIDINE HYDROCHLORIDE IN 0.9% SODIUM CHLORIDE 3.78 MICROGRAM(S)/KG/HR: 4 INJECTION INTRAVENOUS at 05:47

## 2018-07-30 RX ADMIN — HEPARIN SODIUM 5000 UNIT(S): 5000 INJECTION INTRAVENOUS; SUBCUTANEOUS at 13:22

## 2018-07-30 RX ADMIN — CHLORHEXIDINE GLUCONATE 15 MILLILITER(S): 213 SOLUTION TOPICAL at 13:22

## 2018-07-30 RX ADMIN — Medication 50 MILLIGRAM(S): at 17:34

## 2018-07-30 RX ADMIN — DAPTOMYCIN 120 MILLIGRAM(S): 500 INJECTION, POWDER, LYOPHILIZED, FOR SOLUTION INTRAVENOUS at 11:21

## 2018-07-30 RX ADMIN — HEPARIN SODIUM 5000 UNIT(S): 5000 INJECTION INTRAVENOUS; SUBCUTANEOUS at 21:05

## 2018-07-30 RX ADMIN — SENNA PLUS 2 TABLET(S): 8.6 TABLET ORAL at 21:04

## 2018-07-30 RX ADMIN — SODIUM CHLORIDE 100 MILLILITER(S): 9 INJECTION INTRAMUSCULAR; INTRAVENOUS; SUBCUTANEOUS at 05:47

## 2018-07-30 RX ADMIN — Medication 1 MILLIGRAM(S): at 05:29

## 2018-07-30 RX ADMIN — HYDROMORPHONE HYDROCHLORIDE 0.5 MILLIGRAM(S): 2 INJECTION INTRAMUSCULAR; INTRAVENOUS; SUBCUTANEOUS at 01:20

## 2018-07-30 RX ADMIN — DEXMEDETOMIDINE HYDROCHLORIDE IN 0.9% SODIUM CHLORIDE 3.78 MICROGRAM(S)/KG/HR: 4 INJECTION INTRAVENOUS at 08:39

## 2018-07-30 RX ADMIN — CHLORHEXIDINE GLUCONATE 1 APPLICATION(S): 213 SOLUTION TOPICAL at 05:30

## 2018-07-30 RX ADMIN — Medication 50 MILLIGRAM(S): at 13:22

## 2018-07-30 RX ADMIN — HYDROMORPHONE HYDROCHLORIDE 0.5 MILLIGRAM(S): 2 INJECTION INTRAMUSCULAR; INTRAVENOUS; SUBCUTANEOUS at 03:54

## 2018-07-30 RX ADMIN — SODIUM CHLORIDE 75 MILLILITER(S): 9 INJECTION, SOLUTION INTRAVENOUS at 16:10

## 2018-07-30 RX ADMIN — SODIUM CHLORIDE 100 MILLILITER(S): 9 INJECTION INTRAMUSCULAR; INTRAVENOUS; SUBCUTANEOUS at 08:40

## 2018-07-30 RX ADMIN — Medication 2: at 21:22

## 2018-07-30 RX ADMIN — HYDROMORPHONE HYDROCHLORIDE 0.5 MILLIGRAM(S): 2 INJECTION INTRAMUSCULAR; INTRAVENOUS; SUBCUTANEOUS at 05:38

## 2018-07-30 RX ADMIN — SODIUM CHLORIDE 100 MILLILITER(S): 9 INJECTION, SOLUTION INTRAVENOUS at 10:30

## 2018-07-30 RX ADMIN — CEFTAROLINE FOSAMIL 50 MILLIGRAM(S): 600 POWDER, FOR SOLUTION INTRAVENOUS at 05:38

## 2018-07-30 RX ADMIN — Medication 1 MILLIGRAM(S): at 12:40

## 2018-07-30 RX ADMIN — PROPOFOL 1.51 MICROGRAM(S)/KG/MIN: 10 INJECTION, EMULSION INTRAVENOUS at 11:20

## 2018-07-30 RX ADMIN — Medication 0.5 MILLIGRAM(S): at 06:01

## 2018-07-30 RX ADMIN — HYDROMORPHONE HYDROCHLORIDE 0.5 MILLIGRAM(S): 2 INJECTION INTRAMUSCULAR; INTRAVENOUS; SUBCUTANEOUS at 05:29

## 2018-07-30 RX ADMIN — PANTOPRAZOLE SODIUM 40 MILLIGRAM(S): 20 TABLET, DELAYED RELEASE ORAL at 13:22

## 2018-07-30 RX ADMIN — Medication 2: at 13:46

## 2018-07-30 RX ADMIN — Medication 50 MILLIEQUIVALENT(S): at 02:26

## 2018-07-30 NOTE — PROGRESS NOTE ADULT - ATTENDING COMMENTS
I saw and evaluated patient.  I agree with residents note.    patient is now off dopamine.  The urine output is adequate although has a metabolic acidoses likely secondary to ATN  acute respiratory failure and did not tolerate breathing trial   coordinating plan of care with orthopedic surgery who is planning exploration of the him later this week  plan of care reviewed with family  would move to extubation if tolerates breathing trial.  using Precedex for sedation  45 minutes of critical care management applied

## 2018-07-30 NOTE — PROGRESS NOTE ADULT - ASSESSMENT
67 yo female with dementia, history of UC, and s/p RT Hip hemiarthroplasty 6/22/18  postsurgical hematoma  admitted with low grade fever, rigors, increased confusion, found to have leukocytosis, 20% bands, ICU, pressor use   Bld Cxs 7/26-7/28 all MRSA  s/p R hip I&D, lavage, poly exchange- pus encountered, all specimens S aureus  Remains on Vent, now off pressors   Afebrile, Creat stable  L hip Fx noted  Now on Dapto and ceftaroline, blood cult from 7/29 still positive    Plan:  cont combo Tx with Dapto and Ceftaroline for now  downgrade to Rifampin might be an option once bacteremia controlled  will need eventual PICC line once bacteremia is controlled  f/u repeat blood cult  Favor TTE, possibly GAYATHRI  Weaning as tolerated  D/w ICU team 67 yo female with dementia, history of UC, and s/p RT Hip hemiarthroplasty 6/22/18  postsurgical hematoma  admitted with low grade fever, rigors, increased confusion, found to have leukocytosis, 20% bands, ICU, pressor use   Bld Cxs 7/26-7/28 all MRSA  s/p R hip I&D, lavage, poly exchange- pus encountered, all specimens S aureus  Remains on Vent, now off pressors   Afebrile, Creat stable  L hip Fx noted  Now on Dapto and ceftaroline, blood cult from 7/29 still positive    Plan:  cont combo Tx with Dapto and Ceftaroline for now  downgrade to Rifampin might be an option once bacteremia controlled  will need eventual PICC line once bacteremia is controlled  f/u repeat blood cult  Favor TTE, possibly GAYATHRI  Weaning as tolerated  D/w ICU team, d/w family at bedside  source control remains most important issue, awaiting further strategy by ortho

## 2018-07-30 NOTE — PROGRESS NOTE ADULT - ASSESSMENT
68F pmhx of asthma, Alzheimer's anxiety/depression, HLD, UC, HTN, S/P right hip fracture s/p hip hemiarthroplasty after a fracture 1month ago, presented with weakness, worsening mentation, found to have sepsis, hyponatremia. Found to have MRSA bacteremia. S/P R Irrigation and debridement of hip & exchange of femoral head component 07/27/2018. On dapto and ceftaroline as per ID. Hyponatremia resolved, further interventions from nephrology at this point.

## 2018-07-30 NOTE — ADVANCED PRACTICE NURSE CONSULT - ASSESSMENT
Patient is on a low airloss surface and is being turned and positioned as per the nursing documentation.  On assessment she remains vented but the plan is to wean and possibly extubate.  Family is at the bedside.  On assessment she presents with evolving deep tissue injuries to each heel.  The right heel is approximately 2.0 cm X 2.0 cm X 0 cm.  There is no drainage nor odor and the skin remains intact,.  Periwound skin is also intact.  On the left heel the area measures approximately 3.0 cm X 2.0 cm X 0 cm and again with no drainage nor odor noted.  The periwound skin and wounded area is intact at this time.  Will recommend applying Cavilon to both heels daily and usage of the Zflo heel-offloading boots while in bed.  Family able to view areas of injury and are aware of same.   She also is noted curling her toes in and recommended to RN that possibly OT should see patient and assess the feet.    Being followed by nutrition.

## 2018-07-30 NOTE — CHART NOTE - NSCHARTNOTEFT_GEN_A_CORE
Ortho Attending Note (internal consult)    Asked to see patient by Dr. Rey, primary ortho attending.  In brief 68F sustained a R FNFx and underwent subsequent R hip arina 6/22/18 with Dr. Rey. Has sig PMH for dementia, CVA, UC, and per report ambulates with a cane at baseline but is limited by her Altzheimers dementia. Also at the time of her arina was found to have nondisplaced R GT fx treated with cerclage cables. Postop devp wound hematoma but patient became septic requiring pressor support and was emergently admitted to the SICU last week. Now is s/p R hip I&D with Dr. Rey and rima RIOS on 7/27/18 and is now extubated today, pressor support dc'd, and is noted to be improving for hemodynamics. Cxs from blood and OR cultures growing MRSA. Has acute blood loss anemia as well.   On exam is minimally interactive but responds and attends. Aquacell in place and is cdi with 2 HV drains with moderate serosanguinous drainage. 2+ dp pulse and able to wiggle toes. SILT unable to assess 2/2 patient cooperation.    XR demonstrate a R hip cemented arina with displaced GT fx.    I agree with Dr. Rey plan for R hip arina explant 8/3/18. Will be available to assist. Will need to optimize patient preop.  Recommend transfuse to hct>30 preop  Abx per ID, currently on dapto and ctx  Ultimately will need PICC when blood cxs neg  Will plan for R hip arina explant, likely abx spacer placement 8/3/18  Will need consent  Unable to guarantee reimplantation if unable to clear infection  Now that she is extubated, after passing speech/swallow eval will need to optimize nutrition    Kartik Shaffer MD

## 2018-07-30 NOTE — PROGRESS NOTE ADULT - ASSESSMENT
"simvastatin (ZOCOR) 10 MG tablet 90 tablet 3 3/10/2017         Last Written Prescription Date:  03/10/2017  Last Fill Quantity: 90,  # refills: 3   Last office visit: 10/10/2017 with prescribing provider:     Future Office Visit:   Unknown    Requested Prescriptions   Pending Prescriptions Disp Refills     simvastatin (ZOCOR) 10 MG tablet [Pharmacy Med Name: SIMVASTATIN 10MG TABLETS] 90 tablet 0     Sig: TAKE 1 TABLET(10 MG) BY MOUTH AT BEDTIME    Statins Protocol Passed    3/8/2018 12:48 PM       Passed - LDL on file in past 12 months    Recent Labs   Lab Test  10/05/17   1057   LDL  96            Passed - No abnormal creatine kinase in past 12 months    No lab results found.         Passed - Recent (12 mo) or future (30 days) visit within the authorizing provider's specialty    Patient had office visit in the last year or has a visit in the next 30 days with authorizing provider.  See \"Patient Info\" tab in inbasket, or \"Choose Columns\" in Meds & Orders section of the refill encounter.            Passed - Patient is age 18 or older          " 68 year old female presenting with symptomatic hypoglycemia and septic shock secondary to an infected right hip s/p right hip I&D with hemiarthroplasty revision.    PLAN:    Neuro: acute post-op pain, intubated, anxiety, depression, dementia  - Monitor mental status.  - Sedation with Precedex while intubated.  - Dilaudid PRN pain.  - Ativan PRN   - Holding home Zoloft, Seroquel, Remeron, and melatonin.    Resp: acute post-op insufficiency, asthma  - Monitor pulse oximeter.  - Continue mechanical ventilation   - Pulmicort and Duoneb for asthma.    CV: septic vs. cardiogenic shock  - Monitor vital signs.    GI: no acute issues  - NPO with Vital at 30 mL/hr via OG tube. Can advance to goal if unable to extubate.  - Protonix for GERD.    Renal: CKD stage 2, hyponatremia  - Monitor I&Os.  - Monitor electrolytes and replete as necessary.  - NS at 100 mL/hr while being actively resuscitated, hypoglycemic, hyponatremic, and NPO.    Heme: no acute issues  - Lovenox for VTE prophylaxis.    ID: MRSA bacteremia, infected right hip s/p I&D  - Monitor WBC, temperature, and procalcitonin.  - Follow up OR and repeat blood cultures. Reculture as clinically indicated.  - Vancomycin and cefepime for empiric antibiotics. Will narrow down coverage when OR cultures result.  - Will need MRI to rule out lumbar epidural abscess.  - Ortho planning on RTOR today for explantation of hardware.     Endo: hypoglycemia (resolved), hyperglycemia, adrenal insufficiency  - Stress dose steroids for septic shock as patient is chronically on prednisone for her asthma.  - Monitor fingersticks q4hrs for hypoglycemia.    Misc: infected right hip s/p I&D  - WBAT RLE and foot flat weight bear LLE.    Disposition:  - Full code.  - Will remain in SICU for respiratory and hemodynamic monitoring.      - Scar Worley PA-C

## 2018-07-30 NOTE — ADVANCED PRACTICE NURSE CONSULT - REASON FOR CONSULT
Requested by nursing staff to assess skin status on bilateral heels with hyperpigmented tissue that was noted on admission.  Patient is a 68F pmhx of asthma, anxiety/depression, HLD, UC, pHTN presents with weakness.  She was recently hospitalized here for a right hip fracture s/p repair last month.  She is brought  in by her family who say that on Monday she had the staples removed from her hip surgery.  She was 2 weeks ago developed a hematoma on the right hip after using Plavix with Lovenox for PPX after orhto surgery so this was stopped.  She was doing fine this week until last night when she said that she had to go to the bathroom "30 times" per the  and could not urinate or have BM.  She kept trying to get out of bed and walk to the bathroom and she usually walks with a walker with some assistance but last night she acutely became weak and not able to walk with walker and 2 person assist.  She also was brought to the ED 2 weeks ago for new onset severe back pain with radiation to her left hip.  Her  says this presentation was similar to when she broke her right hip last month (has osteoporosis and was concerned again but after ED visit said she did not break her hip.  She has been taking her Oxycodone since the surgery Q4hrs w/o pain relief.  Last night she developed rigors, did not take temperature, urinary frequency, increased confusion and agitation along with back pain and LE weakness so she was brought days.    Presenting with symptomatic hypoglycemia and septic shock secondary to an infected right hip s/p right hip I&D (July 27) with hemiarthroplasty revision.  She remains in the SICU on contact isolation.

## 2018-07-30 NOTE — PROGRESS NOTE ADULT - SUBJECTIVE AND OBJECTIVE BOX
---___---___---___---___---___---___ ---___---___---___---___---___---___---___---___---___---                    <<<  M E D I C A L   A T T E N D I N G    F O L L O W    U P   N O T E  >>>    extubated awake alert to name laying inbed      ---___---___---___---___---___---      <<<  MEDICATIONS:  >>>    MEDICATIONS  (STANDING):  buDESOnide   0.5 milliGRAM(s) Respule 0.5 milliGRAM(s) Inhalation every 12 hours  Ceftaroline Fosamil IVPB 600 milliGRAM(s) IV Intermittent every 12 hours  chlorhexidine 4% Liquid 1 Application(s) Topical <User Schedule>  DAPTOmycin IVPB      DAPTOmycin IVPB 500 milliGRAM(s) IV Intermittent every 24 hours  dexmedetomidine Infusion 0.3 MICROgram(s)/kG/Hr (3.78 mL/Hr) IV Continuous <Continuous>  dextrose 5% 1000 milliLiter(s) (75 mL/Hr) IV Continuous <Continuous>  docusate sodium 100 milliGRAM(s) Oral three times a day  heparin  Injectable 5000 Unit(s) SubCutaneous every 8 hours  hydrocortisone sodium succinate Injectable   IV Push   hydrocortisone sodium succinate Injectable 50 milliGRAM(s) IV Push every 8 hours  insulin lispro (HumaLOG) corrective regimen sliding scale   SubCutaneous three times a day before meals  insulin lispro (HumaLOG) corrective regimen sliding scale   SubCutaneous at bedtime  mirtazapine 45 milliGRAM(s) Oral <User Schedule>  pantoprazole    Tablet 40 milliGRAM(s) Oral before breakfast  QUEtiapine 50 milliGRAM(s) Oral <User Schedule>  senna 2 Tablet(s) Oral at bedtime  sertraline 50 milliGRAM(s) Oral daily      MEDICATIONS  (PRN):  acetaminophen   Tablet. 975 milliGRAM(s) Oral every 6 hours PRN Mild Pain (1 - 3)  ALBUTerol/ipratropium for Nebulization 3 milliLiter(s) Nebulizer every 6 hours PRN Shortness of Breath and/or Wheezing  traMADol 50 milliGRAM(s) Oral every 6 hours PRN Severe Pain (7 - 10)       ---___---___---___---___---___---     <<<REVIEW OF SYSTEM: >>>  unableto obtain   __---___---___---___---___---          <<<  VITAL SIGNS: >>>    T(F): 99.1 (07-30-18 @ 15:00), Max: 99.1 (07-30-18 @ 15:00)  HR: 104 (07-30-18 @ 17:19) (73 - 105)  BP: 123/76 (07-30-18 @ 07:30) (123/76 - 133/76)  RR: 17 (07-30-18 @ 17:00) (11 - 29)  SpO2: 100% (07-30-18 @ 17:19) (67% - 100%)  Wt(kg): --  CAPILLARY BLOOD GLUCOSE      POCT Blood Glucose.: 208 mg/dL (30 Jul 2018 13:42)    I&O's Summary    29 Jul 2018 07:01  -  30 Jul 2018 07:00  --------------------------------------------------------  IN: 3286.7 mL / OUT: 1195 mL / NET: 2091.7 mL    30 Jul 2018 07:01  -  30 Jul 2018 18:16  --------------------------------------------------------  IN: 1013 mL / OUT: 635 mL / NET: 378 mL         ---___---___---___---___---___---                       PHYSICAL EXAM:    GEN: A&O X 2, NAD , comfortable  HEENT: NCAT, PERRL, MMM, no scleral icterus, hearing intact  NECK: Supple, No JVD  CVS: S1S2 , regular , No M/R/G appreciated  PULM: CTA B/L,  no W/R/R appreciated  ABD.: soft. non tender, non distended,  bowel sounds present  Extrem: intact pulses , no edema noted wound vac right side   Derm: No rash or ecchymosis noted      ---___---___---___---___---___---     <<<  LAB AND IMAGING: >>>                          7.6    9.1   )-----------( 190      ( 30 Jul 2018 12:02 )             23.0               07-30    132<L>  |  106  |  40<H>  ----------------------------<  228<H>  4.2   |  13<L>  |  0.72    Ca    7.3<L>      30 Jul 2018 12:02  Phos  2.9     07-30  Mg     2.0     07-30    TPro  5.0<L>  /  Alb  2.9<L>  /  TBili  0.7  /  DBili  0.3<H>  /  AST  47<H>  /  ALT  36  /  AlkPhos  209<H>  07-29    PT/INR - ( 30 Jul 2018 03:28 )   PT: 12.4 sec;   INR: 1.14 ratio         PTT - ( 30 Jul 2018 03:28 )  PTT:29.2 sec                   ABG - ( 30 Jul 2018 13:47 )  pH, Arterial: 7.44  pH, Blood: x     /  pCO2: 21    /  pO2: 161   / HCO3: 14    / Base Excess: -9.0  /  SaO2: 100                     [All pertinent / recent available Imaging reports and other labs reviewed]     ---___---___---___---___---___---___ ---___---___---___---___---           <<<  A S S E S S M E N T   A N D   P L A N :  >>>          -GI/DVT Prophylaxis.    --------------------------------------------  Case discussed with   Education given on     >>______________________<<      Deniz Bolden .         phone   [phone]

## 2018-07-30 NOTE — PROGRESS NOTE ADULT - SUBJECTIVE AND OBJECTIVE BOX
Patient seen and examined. Patient intubated/sedated.    Physical exam  VS: see EMR  Gen: NAD  Right LE: Dressing clean, dry, and intact. Unable to perform neurovascular exam. Capillary refill brisk. Compartments soft and compressible.   +HMV x2.

## 2018-07-30 NOTE — PROGRESS NOTE ADULT - SUBJECTIVE AND OBJECTIVE BOX
CC: f/u for high grade MRSA bacteremia    Pt remains sedated on Vent, now on Dopa for decreased U/O, off pressors    REVIEW OF SYSTEMS:  Not proviede on Vent    Antimicrobials Day # 5  Ceftaroline Fosamil IVPB 600 milliGRAM(s) IV Intermittent every 12 hours  DAPTOmycin IVPB      DAPTOmycin IVPB 500 milliGRAM(s) IV Intermittent every 24 hours    Medications Reviewed    ICU Vital Signs Last 24 Hrs  T(C): 36.7 (30 Jul 2018 07:00), Max: 37.1 (29 Jul 2018 15:00)  T(F): 98 (30 Jul 2018 07:00), Max: 98.7 (29 Jul 2018 15:00)  HR: 76 (30 Jul 2018 12:00) (73 - 103)  BP: 123/76 (30 Jul 2018 07:30) (123/76 - 133/76)  BP(mean): 95 (30 Jul 2018 07:30) (95 - 99)  ABP: 133/69 (30 Jul 2018 12:00) (122/72 - 149/83)  ABP(mean): 95 (30 Jul 2018 12:00) (93 - 113)  RR: 24 (30 Jul 2018 12:00) (10 - 29)  SpO2: 100% (30 Jul 2018 12:00) (67% - 100%)      PHYSICAL EXAM:  General: no acute distress on Vent  Eyes:  anicteric, no conjunctival injection, no discharge  Oropharynx: ETT	  Neck: LIJ CVL  Lungs: clear to auscultation  Heart: regular rate and rhythm; no murmur, rubs or gallops  Abdomen: soft, nondistended, nontender, without mass or organomegaly  Blackman  Skin: R hip incision dressing intact; hemovac x 2  Extremities: no edema  Neurologic: sedated    LAB RESULTS:                                   7.6    9.1   )-----------( 190      ( 30 Jul 2018 12:02 )             23.0   07-30    132<L>  |  106  |  40<H>  ----------------------------<  228<H>  4.2   |  13<L>  |  0.72    Ca    7.3<L>      30 Jul 2018 12:02  Phos  2.9     07-30  Mg     2.0     07-30    TPro  5.0<L>  /  Alb  2.9<L>  /  TBili  0.7  /  DBili  0.3<H>  /  AST  47<H>  /  ALT  36  /  AlkPhos  209<H>  07-29      MICROBIOLOGY:  RECENT CULTURES:  Culture - Tissue with Gram Stain (07.27.18 @ 22:54)    Gram Stain:   No polymorphonuclear cells seen per low power field  Rare Gram variable coccobacilli per oil power field    Specimen Source: .Tissue 1 right hip fluid superficial    Culture Results:   Moderate Staphylococcus aureus    Culture - Blood in AM (07.29.18 @ 08:29)    Gram Stain:   Growth in aerobic bottle: Gram Positive Cocci in Clusters  Growth in anaerobic bottle: Gram Positive Cocci in Clusters    Specimen Source: .Blood Blood-Venous    Culture Results:   Growth in aerobic bottle: Gram Positive Cocci in Clusters  Growth in anaerobic bottle: Gram Positive Cocci in Clusters        RADIOLOGY REVIEWED:  Xray Chest 1 View- PORTABLE-Routine (07.29.18 @ 06:58) >  Clear lungs.    CT Abdomen and Pelvis No Cont (07.26.18 @ 17:19) >  A 17.0 cm collection along the right lateral thigh musculature adjacent   to the right hip prosthesis. Superimposed infection is not excluded.     New compression fracture of the L2 vertebral body as above. Correlate   with pending lumbar spine MRI.    Mildly depressed acute fracture of the sternal manubrium.    Acute comminuted fracture of the left acetabulum.

## 2018-07-30 NOTE — ADVANCED PRACTICE NURSE CONSULT - RECOMMEDATIONS
1.  Cavilon to bilateral heels daily and prn  2.  Zflo heel off-loading boots while in bed  3.  Consider OT to assess feet ( curling toes inward)  4.  Continue with nutritional support  5.  Continue with turning and positioning  Remain available as requested by staff

## 2018-07-30 NOTE — PROGRESS NOTE ADULT - SUBJECTIVE AND OBJECTIVE BOX
[facility] DIVISION OF KIDNEY DISEASES AND HYPERTENSION -- FOLLOW UP NOTE  --------------------------------------------------------------------------------  Chief Complaint:  Hyponatremia.     24 hour events/subjective:  Pt, examined at bed side, intubated, currently on abx for MRSA bacteremia. With good urine output.       PAST HISTORY  --------------------------------------------------------------------------------  No significant changes to PMH, PSH, FHx, SHx, unless otherwise noted    ALLERGIES & MEDICATIONS  --------------------------------------------------------------------------------  Allergies    ASA; dye contrast (Anaphylaxis)  aspirin (Short breath)  divalproex sodium (Other (Unknown))  Haldol (Other (Unknown))  penicillin (Short breath; Rash)  sulfa drugs (Short breath; Rash)  Xanax (Other (Unknown))    Intolerances      Standing Inpatient Medications  buDESOnide   0.5 milliGRAM(s) Respule 0.5 milliGRAM(s) Inhalation every 12 hours  Ceftaroline Fosamil IVPB 600 milliGRAM(s) IV Intermittent every 12 hours  chlorhexidine 0.12% Liquid 15 milliLiter(s) Swish and Spit <User Schedule>  chlorhexidine 4% Liquid 1 Application(s) Topical <User Schedule>  DAPTOmycin IVPB      DAPTOmycin IVPB 500 milliGRAM(s) IV Intermittent every 24 hours  dexmedetomidine Infusion 0.3 MICROgram(s)/kG/Hr IV Continuous <Continuous>  heparin  Injectable 5000 Unit(s) SubCutaneous every 8 hours  hydrocortisone sodium succinate Injectable 50 milliGRAM(s) IV Push every 6 hours  insulin lispro (HumaLOG) corrective regimen sliding scale   SubCutaneous every 4 hours  pantoprazole  Injectable 40 milliGRAM(s) IV Push daily  sodium chloride 0.9%. 1000 milliLiter(s) IV Continuous <Continuous>    PRN Inpatient Medications  HYDROmorphone  Injectable 0.5 milliGRAM(s) IV Push every 2 hours PRN  LORazepam   Injectable 1 milliGRAM(s) IV Push every 3 hours PRN      REVIEW OF SYSTEMS  --------------------------------------------------------------------------------  Unable to obtain patient is intubated.     VITALS/PHYSICAL EXAM  --------------------------------------------------------------------------------  T(C): 36.7 (07-30-18 @ 07:00), Max: 37.3 (07-29-18 @ 11:00)  HR: 82 (07-30-18 @ 08:21) (73 - 103)  BP: 123/76 (07-30-18 @ 07:30) (123/76 - 133/76)  RR: 16 (07-30-18 @ 08:00) (10 - 29)  SpO2: 100% (07-30-18 @ 08:21) (67% - 100%)  Wt(kg): --        07-29-18 @ 07:01  -  07-30-18 @ 07:00  --------------------------------------------------------  IN: 3286.7 mL / OUT: 1195 mL / NET: 2091.7 mL    07-30-18 @ 07:01  -  07-30-18 @ 08:29  --------------------------------------------------------  IN: 118.9 mL / OUT: 0 mL / NET: 118.9 mL      Physical Exam:  Gen:  intubated  HEENT: +intubation  Pulm: air entry present Bl  CV: RRR, S1S2; no rub  Abd: +BS, soft, mild discomfort on palpation  LE: Warm, trace edema, +rt leg drains +     LABS/STUDIES  --------------------------------------------------------------------------------              7.5    8.6   >-----------<  153      [07-30-18 @ 03:28]              23.6     135  |  105  |  36  ----------------------------<  178      [07-29-18 @ 22:37]  3.3   |  14  |  0.73        Ca     7.6     [07-29-18 @ 22:37]      iCa    1.14     [07-30 @ 03:24]      Mg     2.0     [07-30-18 @ 03:28]      Phos  3.3     [07-30-18 @ 03:28]    TPro  5.0  /  Alb  2.9  /  TBili  0.7  /  DBili  0.3  /  AST  47  /  ALT  36  /  AlkPhos  209  [07-29-18 @ 02:58]    PT/INR: PT 12.4 , INR 1.14       [07-30-18 @ 03:28]  PTT: 29.2       [07-30-18 @ 03:28]      Creatinine Trend:  SCr 0.73 [07-29 @ 22:37]  SCr 0.73 [07-29 @ 13:37]  SCr 0.76 [07-29 @ 02:58]  SCr 0.80 [07-28 @ 22:13]  SCr 0.70 [07-28 @ 13:38]

## 2018-07-30 NOTE — PROGRESS NOTE ADULT - PROBLEM SELECTOR PLAN 2
May add K to fluids to avoid hypokalemia.   Check Mg.   Keep K>4. May add K to fluids to avoid hypokalemia.   Check Mg.   Keep K>4.  Could be related to non gap acidosis

## 2018-07-30 NOTE — PROGRESS NOTE ADULT - ATTENDING COMMENTS
I have seen this patient with the fellow and agree with their assessment and plan.    Brady Hernandez MD  Cell   Pager   Office

## 2018-07-30 NOTE — PROGRESS NOTE ADULT - SUBJECTIVE AND OBJECTIVE BOX
HISTORY  68y Female with a past medical history of asthma on steroids, CVA (no residual deficits), pulmonary HTN, HLD, GERD, ulcerative colitis, fatty pancreas, impaired glucose tolerance, anxiety, and depression who presented on 7/26/2018 with altered mental status, rigors, and urinary frequency. Of note, patient was recently hospitalized for a right femoral neck fracture secondary to osteoporosis s/p hemiarthroplasty on 6/22/2018. She had been taking Plavix for her history of CVA and Lovenox for VTE prophylaxis but she developed a hematoma at her surgical site so the Lovenox was stopped. Since then, patient has been progressively more agitated, confused, and weak. Additionally, patient has been complaining of new onset low back pain and left hip pain. In the ED, patient was hemodynamically stable but noted to be hypoglycemic to the 20s so she received 2 amps of D50. She was also given 2 L of crystalloid and started on meropenem & vancomycin for concern of sepsis. CT scan revealed a 17 cm collection adjacent to the right hip prosthesis, an acute L2 compression fracture, and new left acetabular fracture. Patient was admitted to medicine but on 7/27/2018, patient was noted to be more altered, rigorous, tachypneic, febrile to 101 F, and hypotensive w/ SBP in the 60s so an RRT was called. She was given 1 L of crystalloid and started on a norepinephrine gtt. Patient subsequently admitted to SICU for hemodynamic monitoring. An emergent right axillary arterial line and left IJ central venous catheter was placed.        24 HOUR EVENTS: GAYATHRI ordered to rule out endocarditis in the setting of MRSA bacteremia and new findings of decreased EF. Patient started on Dapto/Ceftaroline as per ID. Weaned off dopamine gtt.     SUBJECTIVE/ROS:  [ ] A ten-point review of systems was otherwise negative except as noted.  [ ] Due to altered mental status/intubation, subjective information were not able to be obtained from the patient. History was obtained, to the extent possible, from review of the chart and collateral sources of information.      NEURO  RASS:  -1  Meds: dexmedetomidine Infusion 0.3 MICROgram(s)/kG/Hr IV Continuous <Continuous>  HYDROmorphone  Injectable 0.5 milliGRAM(s) IV Push every 2 hours PRN pain  LORazepam   Injectable 1 milliGRAM(s) IV Push every 3 hours PRN Agitation    [x] Adequacy of sedation and pain control has been assessed and adjusted      RESPIRATORY  RR: 15 (07-30-18 @ 00:00) (10 - 32)  SpO2: 100% (07-30-18 @ 00:00) (67% - 100%)  Exam: unlabored, clear to auscultation bilaterally  Mechanical Ventilation: Mode: AC/ CMV (Assist Control/ Continuous Mandatory Ventilation), RR (machine): 16, RR (patient): 20, TV (machine): 400, FiO2: 30, PEEP: 5, ITime: 1, MAP: 13, PIP: 20  ABG - ( 29 Jul 2018 08:32 )  pH: 7.42  /  pCO2: 24    /  pO2: 148   / HCO3: 15    / Base Excess: -7.8  /  SaO2: 100     Lactate: x      [N/A] Extubation Readiness Assessed  Meds: buDESOnide   0.5 milliGRAM(s) Respule 0.5 milliGRAM(s) Inhalation every 12 hours        CARDIOVASCULAR  HR: 80 (07-30-18 @ 00:00) (80 - 103)  BP: 133/76 (07-29-18 @ 19:00) (133/76 - 134/105)  BP(mean): 99 (07-29-18 @ 19:00) (99 - 110)  ABP: 138/77 (07-30-18 @ 00:00) (111/68 - 171/103)  ABP(mean): 103 (07-30-18 @ 00:00) (86 - 132)  VBG - ( 29 Jul 2018 22:31 )  pH: 7.35  /  pCO2: 32    /  pO2: 36    / HCO3: 17    / Base Excess: -7.2  /  SaO2: 59     Lactate: 0.9    Exam: regular rate and rhythm  Cardiac Rhythm: sinus  Perfusion     [x]Adequate   [ ]Inadequate  Mentation   [x]Normal       [ ]Reduced  Extremities  [x]Warm         [ ]Cool  Volume Status [ ]Hypervolemic [x]Euvolemic [ ]Hypovolemic  Meds: DOPamine Infusion 5 MICROgram(s)/kG/Min IV Continuous <Continuous>        GI/NUTRITION  Exam: soft, nontender, nondistended, incision C/D/I  Diet: NPO with tube feeds vital @ 30 via NGT.   Meds: pantoprazole  Injectable 40 milliGRAM(s) IV Push daily      GENITOURINARY  I&O's Detail    07-28 @ 07:01 - 07-29 @ 07:00  --------------------------------------------------------  IN:    dexmedetomidine Infusion: 268.8 mL    dextrose 5% + sodium chloride 0.9%: 100 mL    DOPamine Infusion: 24.3 mL    IV PiggyBack: 1050 mL    norepinephrine Infusion: 24.7 mL    Packed Red Blood Cells: 300 mL    sodium chloride 0.9%.: 2300 mL    vasopressin Infusion: 28.8 mL    Vital HN: 160 mL  Total IN: 4256.6 mL    OUT:    Accordian: 120 mL    Accordian: 100 mL    Indwelling Catheter - Urethral: 530 mL  Total OUT: 750 mL    Total NET: 3506.6 mL      07-29 @ 07:01 - 07-30 @ 01:03  --------------------------------------------------------  IN:    dexmedetomidine Infusion: 223.6 mL    DOPamine Infusion: 30.8 mL    IV PiggyBack: 200 mL    sodium chloride 0.9%.: 1500 mL    Vital HN: 200 mL  Total IN: 2154.4 mL    OUT:    Accordian: 100 mL    Accordian: 30 mL    Indwelling Catheter - Urethral: 675 mL  Total OUT: 805 mL    Total NET: 1349.4 mL          07-29    135  |  105  |  36<H>  ----------------------------<  178<H>  3.3<L>   |  14<L>  |  0.73    Ca    7.6<L>      29 Jul 2018 22:37  Phos  3.4     07-29  Mg     2.0     07-29    TPro  5.0<L>  /  Alb  2.9<L>  /  TBili  0.7  /  DBili  0.3<H>  /  AST  47<H>  /  ALT  36  /  AlkPhos  209<H>  07-29    [x ] Blackman catheter, indication: urine output monitoring in critically ill   Meds: potassium chloride  20 mEq/100 mL IVPB 20 milliEquivalent(s) IV Intermittent every 2 hours  sodium chloride 0.9%. 1000 milliLiter(s) IV Continuous <Continuous>        HEMATOLOGIC  Meds: heparin  Injectable 5000 Unit(s) SubCutaneous every 8 hours    [x] VTE Prophylaxis                        7.4    9.1   )-----------( 143      ( 29 Jul 2018 22:37 )             22.7     PT/INR - ( 29 Jul 2018 02:58 )   PT: 12.0 sec;   INR: 1.10 ratio         PTT - ( 29 Jul 2018 02:58 )  PTT:32.3 sec  Transfusion     [ ] PRBC   [ ] Platelets   [ ] FFP   [ ] Cryoprecipitate      INFECTIOUS DISEASES  WBC Count: 9.1 K/uL (07-29 @ 22:37)  WBC Count: 12.5 K/uL (07-29 @ 13:37)  WBC Count: 13.7 K/uL (07-29 @ 02:58)    RECENT CULTURES:  Specimen Source: .Blood Blood  Date/Time: 07-28 @ 05:27  Culture Results:   Growth in aerobic and anaerobic bottles: Methicillin resistant  Staphylococcus aureus  See previous culture 10-CB-  Gram Stain:   Growth in aerobic bottle: Gram Positive Cocci in Clusters  Growth in anaerobic bottle: Gram Positive Cocci in Clusters  Organism: --  Specimen Source: .Tissue 1 right hip fluid superficial  Date/Time: 07-27 @ 22:54  Culture Results:   Moderate Methicillin resistant Staphylococcus aureus  Gram Stain:   No polymorphonuclear cells seen per low power field  Rare Gram variable coccobacilli per oil power field  Organism: Methicillin resistant Staphylococcus aureus  Specimen Source: .Surgical Swab right hip fluid deep  Date/Time: 07-27 @ 22:34  Culture Results:   Moderate Methicillin resistant Staphylococcus aureus  See previous culture [id number]  Gram Stain: --  Organism: Methicillin resistant Staphylococcus aureus  Specimen Source: .Tissue Other, 2 right hip tissue  Date/Time: 07-27 @ 21:26  Culture Results:   Few Methicillin resistant Staphylococcus aureus  Gram Stain:   No polymorphonuclear cells seen per low power field  No organisms seen per oil power field  Organism: Methicillin resistant Staphylococcus aureus  Specimen Source: .Tissue Other, right hip soft tissue  Date/Time: 07-27 @ 21:22  Culture Results:   Few Methicillin resistant Staphylococcus aureus  Gram Stain:   No polymorphonuclear cells seen per low power field  No organisms seen per oil power field  Organism: Methicillin resistant Staphylococcus aureus  Specimen Source: .Blood Blood  Date/Time: 07-27 @ 08:24  Culture Results:   Growth in aerobic and anaerobic bottles: Methicillin resistant  Staphylococcus aureus  See previous culture [id number]  Gram Stain:   Growth in anaerobic bottle: Gram Positive Cocci in Clusters  Growth in aerobic bottle: Gram Positive Cocci in Clusters  Organism: --  Specimen Source: .Urine Catheterized  Date/Time: 07-26 @ 16:29  Culture Results:   No growth  Gram Stain: --  Organism: --  Specimen Source: .Blood Blood-Peripheral  Date/Time: 07-26 @ 14:43  Culture Results:   Growth in aerobic and anaerobic bottles:  Methicillin resistant Staphylococcus aureus  See previous culture [id number]  Gram Stain:   Growth in aerobic and anaerobic bottles: Gram Positive Cocci in Clusters  Organism: Blood Culture PCR  Methicillin resistant Staphylococcus aureus    Meds: Ceftaroline Fosamil IVPB 600 milliGRAM(s) IV Intermittent every 12 hours  DAPTOmycin IVPB      DAPTOmycin IVPB 500 milliGRAM(s) IV Intermittent every 24 hours        ENDOCRINE  CAPILLARY BLOOD GLUCOSE      POCT Blood Glucose.: 182 mg/dL (29 Jul 2018 22:23)  POCT Blood Glucose.: 156 mg/dL (29 Jul 2018 17:46)  POCT Blood Glucose.: 166 mg/dL (29 Jul 2018 13:53)  POCT Blood Glucose.: 164 mg/dL (29 Jul 2018 10:39)  POCT Blood Glucose.: 117 mg/dL (29 Jul 2018 05:24)    Meds: hydrocortisone sodium succinate Injectable 50 milliGRAM(s) IV Push every 6 hours  insulin lispro (HumaLOG) corrective regimen sliding scale   SubCutaneous every 4 hours        ACCESS DEVICES:  [x] Peripheral IV  [ ] Central Venous Line	[ ] R	[ ] L	[ ] IJ	[ ] Fem	[ ] SC	Placed:   [ ] Arterial Line		[ ] R	[ ] L	[ ] Fem	[ ] Rad	[ ] Ax	Placed:   [ ] PICC:					[ ] Mediport  [x ] Urinary Catheter, Date Placed:   [x] Necessity of urinary, arterial, and venous catheters discussed    OTHER MEDICATIONS:  chlorhexidine 0.12% Liquid 15 milliLiter(s) Swish and Spit <User Schedule>  chlorhexidine 4% Liquid 1 Application(s) Topical <User Schedule>      CODE STATUS: full code       IMAGING: < from: CT Abdomen and Pelvis No Cont (07.26.18 @ 17:19) >  IMPRESSION:     A 17.0 cm collection along the right lateral thigh musculature adjacent   to the right hip prosthesis. Superimposed infection is not excluded.     New compression fracture of the L2 vertebral body as above. Correlate   with pending lumbar spine MRI.    Mildly depressed acute fracture of the sternal manubrium.    Acute comminuted fracture of the left acetabulum.    < end of copied text > HISTORY  68y Female with a past medical history of asthma on steroids, CVA (no residual deficits), pulmonary HTN, HLD, GERD, ulcerative colitis, fatty pancreas, impaired glucose tolerance, anxiety, and depression who presented on 7/26/2018 with altered mental status, rigors, and urinary frequency. Of note, patient was recently hospitalized for a right femoral neck fracture secondary to osteoporosis s/p hemiarthroplasty on 6/22/2018. She had been taking Plavix for her history of CVA and Lovenox for VTE prophylaxis but she developed a hematoma at her surgical site so the Lovenox was stopped. Since then, patient has been progressively more agitated, confused, and weak. Additionally, patient has been complaining of new onset low back pain and left hip pain. In the ED, patient was hemodynamically stable but noted to be hypoglycemic to the 20s so she received 2 amps of D50. She was also given 2 L of crystalloid and started on meropenem & vancomycin for concern of sepsis. CT scan revealed a 17 cm collection adjacent to the right hip prosthesis, an acute L2 compression fracture, and new left acetabular fracture. Patient was admitted to medicine but on 7/27/2018, patient was noted to be more altered, rigorous, tachypneic, febrile to 101 F, and hypotensive w/ SBP in the 60s so an RRT was called. She was given 1 L of crystalloid and started on a norepinephrine gtt. Patient subsequently admitted to SICU for hemodynamic monitoring. An emergent right axillary arterial line and left IJ central venous catheter was placed.        24 HOUR EVENTS: GAYATHRI ordered to rule out endocarditis in the setting of MRSA bacteremia and new findings of decreased EF. Patient started on Dapto/Ceftaroline as per ID. Weaned off dopamine gtt.     SUBJECTIVE/ROS:  [ ] A ten-point review of systems was otherwise negative except as noted.  [ ] Due to altered mental status/intubation, subjective information were not able to be obtained from the patient. History was obtained, to the extent possible, from review of the chart and collateral sources of information.      NEURO  RASS:  -1  Meds: dexmedetomidine Infusion 0.3 MICROgram(s)/kG/Hr IV Continuous <Continuous>  HYDROmorphone  Injectable 0.5 milliGRAM(s) IV Push every 2 hours PRN pain  LORazepam   Injectable 1 milliGRAM(s) IV Push every 3 hours PRN Agitation    [x] Adequacy of sedation and pain control has been assessed and adjusted      RESPIRATORY  RR: 15 (07-30-18 @ 00:00) (10 - 32)  SpO2: 100% (07-30-18 @ 00:00) (67% - 100%)  Exam: unlabored, clear to auscultation bilaterally  Mechanical Ventilation: Mode: AC/ CMV (Assist Control/ Continuous Mandatory Ventilation), RR (machine): 16, RR (patient): 20, TV (machine): 400, FiO2: 30, PEEP: 5, ITime: 1, MAP: 13, PIP: 20  ABG - ( 29 Jul 2018 08:32 )  pH: 7.42  /  pCO2: 24    /  pO2: 148   / HCO3: 15    / Base Excess: -7.8  /  SaO2: 100     Lactate: x      [N/A] Extubation Readiness Assessed  Meds: buDESOnide   0.5 milliGRAM(s) Respule 0.5 milliGRAM(s) Inhalation every 12 hours        CARDIOVASCULAR  HR: 80 (07-30-18 @ 00:00) (80 - 103)  BP: 133/76 (07-29-18 @ 19:00) (133/76 - 134/105)  BP(mean): 99 (07-29-18 @ 19:00) (99 - 110)  ABP: 138/77 (07-30-18 @ 00:00) (111/68 - 171/103)  ABP(mean): 103 (07-30-18 @ 00:00) (86 - 132)  VBG - ( 29 Jul 2018 22:31 )  pH: 7.35  /  pCO2: 32    /  pO2: 36    / HCO3: 17    / Base Excess: -7.2  /  SaO2: 59     Lactate: 0.9    Exam: regular rate and rhythm  Cardiac Rhythm: sinus  Perfusion     [x]Adequate   [ ]Inadequate  Mentation   [x]Normal       [ ]Reduced  Extremities  [x]Warm         [ ]Cool  Volume Status [ ]Hypervolemic [x]Euvolemic [ ]Hypovolemic  Meds: DOPamine Infusion 5 MICROgram(s)/kG/Min IV Continuous <Continuous>        GI/NUTRITION  Exam: soft, nontender, nondistended, incision C/D/I  Diet: NPO with tube feeds vital @ 30 via NGT.   Meds: pantoprazole  Injectable 40 milliGRAM(s) IV Push daily      GENITOURINARY  I&O's Detail    07-28 @ 07:01 - 07-29 @ 07:00  --------------------------------------------------------  IN:    dexmedetomidine Infusion: 268.8 mL    dextrose 5% + sodium chloride 0.9%: 100 mL    DOPamine Infusion: 24.3 mL    IV PiggyBack: 1050 mL    norepinephrine Infusion: 24.7 mL    Packed Red Blood Cells: 300 mL    sodium chloride 0.9%.: 2300 mL    vasopressin Infusion: 28.8 mL    Vital HN: 160 mL  Total IN: 4256.6 mL    OUT:    Accordian: 120 mL    Accordian: 100 mL    Indwelling Catheter - Urethral: 530 mL  Total OUT: 750 mL    Total NET: 3506.6 mL      07-29 @ 07:01 - 07-30 @ 01:03  --------------------------------------------------------  IN:    dexmedetomidine Infusion: 223.6 mL    DOPamine Infusion: 30.8 mL    IV PiggyBack: 200 mL    sodium chloride 0.9%.: 1500 mL    Vital HN: 200 mL  Total IN: 2154.4 mL    OUT:    Accordian: 100 mL    Accordian: 30 mL    Indwelling Catheter - Urethral: 675 mL  Total OUT: 805 mL    Total NET: 1349.4 mL                      7.5                  x    | x    | x            8.6   >-----------< 153     ------------------------< x                     23.6                 x    | x    | x                                            Ca x     Mg 2.0   Ph 3.3        [x ] Blackman catheter, indication: urine output monitoring in critically ill   Meds: potassium chloride  20 mEq/100 mL IVPB 20 milliEquivalent(s) IV Intermittent every 2 hours  sodium chloride 0.9%. 1000 milliLiter(s) IV Continuous <Continuous>        HEMATOLOGIC  Meds: heparin  Injectable 5000 Unit(s) SubCutaneous every 8 hours    [x] VTE Prophylaxis             PT/INR -  12.4 sec / 1.14 ratio   ( 30 Jul 2018 03:28 )       PTT -  29.2 sec   ( 30 Jul 2018 03:28 )  Transfusion     [ ] PRBC   [ ] Platelets   [ ] FFP   [ ] Cryoprecipitate      INFECTIOUS DISEASES  WBC Count: 9.1 K/uL (07-29 @ 22:37)  WBC Count: 12.5 K/uL (07-29 @ 13:37)  WBC Count: 13.7 K/uL (07-29 @ 02:58)    RECENT CULTURES:  Specimen Source: .Blood Blood  Date/Time: 07-28 @ 05:27  Culture Results:   Growth in aerobic and anaerobic bottles: Methicillin resistant  Staphylococcus aureus  See previous culture 10-CB-  Gram Stain:   Growth in aerobic bottle: Gram Positive Cocci in Clusters  Growth in anaerobic bottle: Gram Positive Cocci in Clusters  Organism: --  Specimen Source: .Tissue 1 right hip fluid superficial  Date/Time: 07-27 @ 22:54  Culture Results:   Moderate Methicillin resistant Staphylococcus aureus  Gram Stain:   No polymorphonuclear cells seen per low power field  Rare Gram variable coccobacilli per oil power field  Organism: Methicillin resistant Staphylococcus aureus  Specimen Source: .Surgical Swab right hip fluid deep  Date/Time: 07-27 @ 22:34  Culture Results:   Moderate Methicillin resistant Staphylococcus aureus  See previous culture [id number]  Gram Stain: --  Organism: Methicillin resistant Staphylococcus aureus  Specimen Source: .Tissue Other, 2 right hip tissue  Date/Time: 07-27 @ 21:26  Culture Results:   Few Methicillin resistant Staphylococcus aureus  Gram Stain:   No polymorphonuclear cells seen per low power field  No organisms seen per oil power field  Organism: Methicillin resistant Staphylococcus aureus  Specimen Source: .Tissue Other, right hip soft tissue  Date/Time: 07-27 @ 21:22  Culture Results:   Few Methicillin resistant Staphylococcus aureus  Gram Stain:   No polymorphonuclear cells seen per low power field  No organisms seen per oil power field  Organism: Methicillin resistant Staphylococcus aureus  Specimen Source: .Blood Blood  Date/Time: 07-27 @ 08:24  Culture Results:   Growth in aerobic and anaerobic bottles: Methicillin resistant  Staphylococcus aureus  See previous culture [id number]  Gram Stain:   Growth in anaerobic bottle: Gram Positive Cocci in Clusters  Growth in aerobic bottle: Gram Positive Cocci in Clusters  Organism: --  Specimen Source: .Urine Catheterized  Date/Time: 07-26 @ 16:29  Culture Results:   No growth  Gram Stain: --  Organism: --  Specimen Source: .Blood Blood-Peripheral  Date/Time: 07-26 @ 14:43  Culture Results:   Growth in aerobic and anaerobic bottles:  Methicillin resistant Staphylococcus aureus  See previous culture [id number]  Gram Stain:   Growth in aerobic and anaerobic bottles: Gram Positive Cocci in Clusters  Organism: Blood Culture PCR  Methicillin resistant Staphylococcus aureus    Meds: Ceftaroline Fosamil IVPB 600 milliGRAM(s) IV Intermittent every 12 hours  DAPTOmycin IVPB      DAPTOmycin IVPB 500 milliGRAM(s) IV Intermittent every 24 hours        ENDOCRINE  CAPILLARY BLOOD GLUCOSE      POCT Blood Glucose.: 182 mg/dL (29 Jul 2018 22:23)  POCT Blood Glucose.: 156 mg/dL (29 Jul 2018 17:46)  POCT Blood Glucose.: 166 mg/dL (29 Jul 2018 13:53)  POCT Blood Glucose.: 164 mg/dL (29 Jul 2018 10:39)  POCT Blood Glucose.: 117 mg/dL (29 Jul 2018 05:24)    Meds: hydrocortisone sodium succinate Injectable 50 milliGRAM(s) IV Push every 6 hours  insulin lispro (HumaLOG) corrective regimen sliding scale   SubCutaneous every 4 hours        ACCESS DEVICES:  [x] Peripheral IV  [ ] Central Venous Line	[ ] R	[ ] L	[ ] IJ	[ ] Fem	[ ] SC	Placed:   [ ] Arterial Line		[ ] R	[ ] L	[ ] Fem	[ ] Rad	[ ] Ax	Placed:   [ ] PICC:					[ ] Mediport  [x ] Urinary Catheter, Date Placed:   [x] Necessity of urinary, arterial, and venous catheters discussed    OTHER MEDICATIONS:  chlorhexidine 0.12% Liquid 15 milliLiter(s) Swish and Spit <User Schedule>  chlorhexidine 4% Liquid 1 Application(s) Topical <User Schedule>      CODE STATUS: full code       IMAGING: < from: CT Abdomen and Pelvis No Cont (07.26.18 @ 17:19) >  IMPRESSION:     A 17.0 cm collection along the right lateral thigh musculature adjacent   to the right hip prosthesis. Superimposed infection is not excluded.     New compression fracture of the L2 vertebral body as above. Correlate   with pending lumbar spine MRI.    Mildly depressed acute fracture of the sternal manubrium.    Acute comminuted fracture of the left acetabulum.    < end of copied text >

## 2018-07-31 ENCOUNTER — APPOINTMENT (OUTPATIENT)
Dept: ORTHOPEDIC SURGERY | Facility: CLINIC | Age: 68
End: 2018-07-31

## 2018-07-31 LAB
ANION GAP SERPL CALC-SCNC: 16 MMOL/L — SIGNIFICANT CHANGE UP (ref 5–17)
ANION GAP SERPL CALC-SCNC: 16 MMOL/L — SIGNIFICANT CHANGE UP (ref 5–17)
APTT BLD: 27.4 SEC — LOW (ref 27.5–37.4)
APTT BLD: 29.3 SEC — SIGNIFICANT CHANGE UP (ref 27.5–37.4)
BLD GP AB SCN SERPL QL: NEGATIVE — SIGNIFICANT CHANGE UP
BUN SERPL-MCNC: 36 MG/DL — HIGH (ref 7–23)
BUN SERPL-MCNC: 37 MG/DL — HIGH (ref 7–23)
C3 SERPL-MCNC: 82 MG/DL — SIGNIFICANT CHANGE UP (ref 81–157)
C4 SERPL-MCNC: 29 MG/DL — SIGNIFICANT CHANGE UP (ref 13–39)
CALCIUM SERPL-MCNC: 7.6 MG/DL — LOW (ref 8.4–10.5)
CALCIUM SERPL-MCNC: 7.6 MG/DL — LOW (ref 8.4–10.5)
CARDIOLIPIN AB SER-ACNC: NEGATIVE — SIGNIFICANT CHANGE UP
CHLORIDE SERPL-SCNC: 105 MMOL/L — SIGNIFICANT CHANGE UP (ref 96–108)
CHLORIDE SERPL-SCNC: 105 MMOL/L — SIGNIFICANT CHANGE UP (ref 96–108)
CK MB CFR SERPL CALC: 3.9 NG/ML — HIGH (ref 0–3.8)
CK SERPL-CCNC: 37 U/L — SIGNIFICANT CHANGE UP (ref 25–170)
CO2 SERPL-SCNC: 16 MMOL/L — LOW (ref 22–31)
CO2 SERPL-SCNC: 17 MMOL/L — LOW (ref 22–31)
CREAT SERPL-MCNC: 0.76 MG/DL — SIGNIFICANT CHANGE UP (ref 0.5–1.3)
CREAT SERPL-MCNC: 0.79 MG/DL — SIGNIFICANT CHANGE UP (ref 0.5–1.3)
CULTURE RESULTS: SIGNIFICANT CHANGE UP
DSDNA AB SER-ACNC: <12 IU/ML — SIGNIFICANT CHANGE UP
GAS PNL BLDA: SIGNIFICANT CHANGE UP
GLUCOSE SERPL-MCNC: 224 MG/DL — HIGH (ref 70–99)
GLUCOSE SERPL-MCNC: 248 MG/DL — HIGH (ref 70–99)
GRAM STN FLD: SIGNIFICANT CHANGE UP
HCT VFR BLD CALC: 21.3 % — LOW (ref 34.5–45)
HCT VFR BLD CALC: 22.2 % — LOW (ref 34.5–45)
HCT VFR BLD CALC: 26 % — LOW (ref 34.5–45)
HGB BLD-MCNC: 7.2 G/DL — LOW (ref 11.5–15.5)
HGB BLD-MCNC: 7.6 G/DL — LOW (ref 11.5–15.5)
HGB BLD-MCNC: 8.7 G/DL — LOW (ref 11.5–15.5)
IGA FLD-MCNC: 68 MG/DL — LOW (ref 84–499)
IGG FLD-MCNC: 292 MG/DL — LOW (ref 610–1660)
IGM SERPL-MCNC: 33 MG/DL — LOW (ref 35–242)
INR BLD: 1.13 RATIO — SIGNIFICANT CHANGE UP (ref 0.88–1.16)
INTERPRETATION SERPL IFE-IMP: SIGNIFICANT CHANGE UP
KAPPA LC SER QL IFE: 0.84 MG/DL — SIGNIFICANT CHANGE UP (ref 0.33–1.94)
KAPPA LC SER QL IFE: 0.84 MG/DL — SIGNIFICANT CHANGE UP (ref 0.33–1.94)
KAPPA/LAMBDA FREE LIGHT CHAIN RATIO, SERUM: 0.71 RATIO — SIGNIFICANT CHANGE UP (ref 0.26–1.65)
KAPPA/LAMBDA FREE LIGHT CHAIN RATIO, SERUM: 0.71 RATIO — SIGNIFICANT CHANGE UP (ref 0.26–1.65)
LAMBDA LC SER QL IFE: 1.18 MG/DL — SIGNIFICANT CHANGE UP (ref 0.57–2.63)
LAMBDA LC SER QL IFE: 1.18 MG/DL — SIGNIFICANT CHANGE UP (ref 0.57–2.63)
MAGNESIUM SERPL-MCNC: 2.4 MG/DL — SIGNIFICANT CHANGE UP (ref 1.6–2.6)
MAGNESIUM SERPL-MCNC: 2.4 MG/DL — SIGNIFICANT CHANGE UP (ref 1.6–2.6)
MCHC RBC-ENTMCNC: 28.9 PG — SIGNIFICANT CHANGE UP (ref 27–34)
MCHC RBC-ENTMCNC: 29 PG — SIGNIFICANT CHANGE UP (ref 27–34)
MCHC RBC-ENTMCNC: 29.3 PG — SIGNIFICANT CHANGE UP (ref 27–34)
MCHC RBC-ENTMCNC: 33.3 GM/DL — SIGNIFICANT CHANGE UP (ref 32–36)
MCHC RBC-ENTMCNC: 33.5 GM/DL — SIGNIFICANT CHANGE UP (ref 32–36)
MCHC RBC-ENTMCNC: 34.2 GM/DL — SIGNIFICANT CHANGE UP (ref 32–36)
MCV RBC AUTO: 85.9 FL — SIGNIFICANT CHANGE UP (ref 80–100)
MCV RBC AUTO: 86.3 FL — SIGNIFICANT CHANGE UP (ref 80–100)
MCV RBC AUTO: 87.1 FL — SIGNIFICANT CHANGE UP (ref 80–100)
PHOSPHATE SERPL-MCNC: 3.3 MG/DL — SIGNIFICANT CHANGE UP (ref 2.5–4.5)
PHOSPHATE SERPL-MCNC: 3.4 MG/DL — SIGNIFICANT CHANGE UP (ref 2.5–4.5)
PLATELET # BLD AUTO: 217 K/UL — SIGNIFICANT CHANGE UP (ref 150–400)
PLATELET # BLD AUTO: 240 K/UL — SIGNIFICANT CHANGE UP (ref 150–400)
PLATELET # BLD AUTO: 244 K/UL — SIGNIFICANT CHANGE UP (ref 150–400)
POTASSIUM SERPL-MCNC: 3.7 MMOL/L — SIGNIFICANT CHANGE UP (ref 3.5–5.3)
POTASSIUM SERPL-MCNC: 4.1 MMOL/L — SIGNIFICANT CHANGE UP (ref 3.5–5.3)
POTASSIUM SERPL-SCNC: 3.7 MMOL/L — SIGNIFICANT CHANGE UP (ref 3.5–5.3)
POTASSIUM SERPL-SCNC: 4.1 MMOL/L — SIGNIFICANT CHANGE UP (ref 3.5–5.3)
PROCALCITONIN SERPL-MCNC: 2.37 NG/ML — HIGH (ref 0.02–0.1)
PROTHROM AB SERPL-ACNC: 12.4 SEC — SIGNIFICANT CHANGE UP (ref 9.8–12.7)
RBC # BLD: 2.47 M/UL — LOW (ref 3.8–5.2)
RBC # BLD: 2.59 M/UL — LOW (ref 3.8–5.2)
RBC # BLD: 2.99 M/UL — LOW (ref 3.8–5.2)
RBC # FLD: 17.3 % — HIGH (ref 10.3–14.5)
RBC # FLD: 17.6 % — HIGH (ref 10.3–14.5)
RBC # FLD: 18 % — HIGH (ref 10.3–14.5)
RH IG SCN BLD-IMP: NEGATIVE — SIGNIFICANT CHANGE UP
SODIUM SERPL-SCNC: 137 MMOL/L — SIGNIFICANT CHANGE UP (ref 135–145)
SODIUM SERPL-SCNC: 138 MMOL/L — SIGNIFICANT CHANGE UP (ref 135–145)
SPECIMEN SOURCE: SIGNIFICANT CHANGE UP
TROPONIN T, HIGH SENSITIVITY RESULT: 85 NG/L — HIGH (ref 0–51)
TROPONIN T, HIGH SENSITIVITY RESULT: 93 NG/L — HIGH (ref 0–51)
WBC # BLD: 13.3 K/UL — HIGH (ref 3.8–10.5)
WBC # BLD: 7.4 K/UL — SIGNIFICANT CHANGE UP (ref 3.8–10.5)
WBC # BLD: 9.8 K/UL — SIGNIFICANT CHANGE UP (ref 3.8–10.5)
WBC # FLD AUTO: 13.3 K/UL — HIGH (ref 3.8–10.5)
WBC # FLD AUTO: 7.4 K/UL — SIGNIFICANT CHANGE UP (ref 3.8–10.5)
WBC # FLD AUTO: 9.8 K/UL — SIGNIFICANT CHANGE UP (ref 3.8–10.5)

## 2018-07-31 PROCEDURE — 93010 ELECTROCARDIOGRAM REPORT: CPT

## 2018-07-31 PROCEDURE — 99291 CRITICAL CARE FIRST HOUR: CPT

## 2018-07-31 PROCEDURE — 99292 CRITICAL CARE ADDL 30 MIN: CPT

## 2018-07-31 PROCEDURE — 71045 X-RAY EXAM CHEST 1 VIEW: CPT | Mod: 26

## 2018-07-31 RX ORDER — HYDROCORTISONE 20 MG
200 TABLET ORAL ONCE
Qty: 0 | Refills: 0 | Status: COMPLETED | OUTPATIENT
Start: 2018-07-31 | End: 2018-07-31

## 2018-07-31 RX ORDER — HYDROMORPHONE HYDROCHLORIDE 2 MG/ML
0.5 INJECTION INTRAMUSCULAR; INTRAVENOUS; SUBCUTANEOUS ONCE
Qty: 0 | Refills: 0 | Status: DISCONTINUED | OUTPATIENT
Start: 2018-07-31 | End: 2018-07-31

## 2018-07-31 RX ORDER — INSULIN LISPRO 100/ML
VIAL (ML) SUBCUTANEOUS EVERY 6 HOURS
Qty: 0 | Refills: 0 | Status: DISCONTINUED | OUTPATIENT
Start: 2018-07-31 | End: 2018-07-31

## 2018-07-31 RX ORDER — LIDOCAINE 4 G/100G
1 CREAM TOPICAL EVERY 24 HOURS
Qty: 0 | Refills: 0 | Status: DISCONTINUED | OUTPATIENT
Start: 2018-07-31 | End: 2018-08-03

## 2018-07-31 RX ORDER — POTASSIUM CHLORIDE 20 MEQ
20 PACKET (EA) ORAL
Qty: 0 | Refills: 0 | Status: COMPLETED | OUTPATIENT
Start: 2018-07-31 | End: 2018-07-31

## 2018-07-31 RX ORDER — HEPARIN SODIUM 5000 [USP'U]/ML
5000 INJECTION INTRAVENOUS; SUBCUTANEOUS EVERY 8 HOURS
Qty: 0 | Refills: 0 | Status: COMPLETED | OUTPATIENT
Start: 2018-07-31 | End: 2018-08-02

## 2018-07-31 RX ORDER — ACETAMINOPHEN 500 MG
1000 TABLET ORAL ONCE
Qty: 0 | Refills: 0 | Status: COMPLETED | OUTPATIENT
Start: 2018-07-31 | End: 2018-07-31

## 2018-07-31 RX ORDER — ACETAMINOPHEN 500 MG
975 TABLET ORAL EVERY 6 HOURS
Qty: 0 | Refills: 0 | Status: DISCONTINUED | OUTPATIENT
Start: 2018-07-31 | End: 2018-08-02

## 2018-07-31 RX ORDER — ACETAMINOPHEN 500 MG
1000 TABLET ORAL ONCE
Qty: 0 | Refills: 0 | Status: DISCONTINUED | OUTPATIENT
Start: 2018-07-31 | End: 2018-07-31

## 2018-07-31 RX ORDER — LANOLIN ALCOHOL/MO/W.PET/CERES
3 CREAM (GRAM) TOPICAL
Qty: 0 | Refills: 0 | Status: DISCONTINUED | OUTPATIENT
Start: 2018-07-31 | End: 2018-08-02

## 2018-07-31 RX ORDER — INSULIN LISPRO 100/ML
VIAL (ML) SUBCUTANEOUS AT BEDTIME
Qty: 0 | Refills: 0 | Status: DISCONTINUED | OUTPATIENT
Start: 2018-07-31 | End: 2018-08-02

## 2018-07-31 RX ORDER — FAMOTIDINE 10 MG/ML
20 INJECTION INTRAVENOUS
Qty: 0 | Refills: 0 | Status: DISCONTINUED | OUTPATIENT
Start: 2018-07-31 | End: 2018-08-02

## 2018-07-31 RX ORDER — INSULIN LISPRO 100/ML
VIAL (ML) SUBCUTANEOUS AT BEDTIME
Qty: 0 | Refills: 0 | Status: DISCONTINUED | OUTPATIENT
Start: 2018-07-31 | End: 2018-07-31

## 2018-07-31 RX ORDER — TRAMADOL HYDROCHLORIDE 50 MG/1
50 TABLET ORAL EVERY 6 HOURS
Qty: 0 | Refills: 0 | Status: DISCONTINUED | OUTPATIENT
Start: 2018-07-31 | End: 2018-08-02

## 2018-07-31 RX ORDER — DEXMEDETOMIDINE HYDROCHLORIDE IN 0.9% SODIUM CHLORIDE 4 UG/ML
0.2 INJECTION INTRAVENOUS
Qty: 200 | Refills: 0 | Status: DISCONTINUED | OUTPATIENT
Start: 2018-07-31 | End: 2018-08-02

## 2018-07-31 RX ORDER — INSULIN LISPRO 100/ML
VIAL (ML) SUBCUTANEOUS
Qty: 0 | Refills: 0 | Status: DISCONTINUED | OUTPATIENT
Start: 2018-07-31 | End: 2018-07-31

## 2018-07-31 RX ORDER — INSULIN LISPRO 100/ML
VIAL (ML) SUBCUTANEOUS EVERY 4 HOURS
Qty: 0 | Refills: 0 | Status: DISCONTINUED | OUTPATIENT
Start: 2018-07-31 | End: 2018-07-31

## 2018-07-31 RX ORDER — HEPARIN SODIUM 5000 [USP'U]/ML
900 INJECTION INTRAVENOUS; SUBCUTANEOUS
Qty: 25000 | Refills: 0 | Status: DISCONTINUED | OUTPATIENT
Start: 2018-07-31 | End: 2018-07-31

## 2018-07-31 RX ORDER — INSULIN LISPRO 100/ML
VIAL (ML) SUBCUTANEOUS
Qty: 0 | Refills: 0 | Status: DISCONTINUED | OUTPATIENT
Start: 2018-07-31 | End: 2018-08-02

## 2018-07-31 RX ORDER — POTASSIUM CHLORIDE 20 MEQ
20 PACKET (EA) ORAL
Qty: 0 | Refills: 0 | Status: DISCONTINUED | OUTPATIENT
Start: 2018-07-31 | End: 2018-07-31

## 2018-07-31 RX ADMIN — Medication 1000 MILLIGRAM(S): at 19:00

## 2018-07-31 RX ADMIN — HEPARIN SODIUM 5000 UNIT(S): 5000 INJECTION INTRAVENOUS; SUBCUTANEOUS at 22:41

## 2018-07-31 RX ADMIN — Medication 4: at 06:53

## 2018-07-31 RX ADMIN — LIDOCAINE 1 PATCH: 4 CREAM TOPICAL at 09:35

## 2018-07-31 RX ADMIN — CEFTAROLINE FOSAMIL 50 MILLIGRAM(S): 600 POWDER, FOR SOLUTION INTRAVENOUS at 18:13

## 2018-07-31 RX ADMIN — DEXMEDETOMIDINE HYDROCHLORIDE IN 0.9% SODIUM CHLORIDE 2.52 MICROGRAM(S)/KG/HR: 4 INJECTION INTRAVENOUS at 05:42

## 2018-07-31 RX ADMIN — Medication 50 MILLIGRAM(S): at 10:00

## 2018-07-31 RX ADMIN — HYDROMORPHONE HYDROCHLORIDE 0.5 MILLIGRAM(S): 2 INJECTION INTRAMUSCULAR; INTRAVENOUS; SUBCUTANEOUS at 01:15

## 2018-07-31 RX ADMIN — FAMOTIDINE 20 MILLIGRAM(S): 10 INJECTION INTRAVENOUS at 22:42

## 2018-07-31 RX ADMIN — Medication 100 MILLIGRAM(S): at 14:16

## 2018-07-31 RX ADMIN — CHLORHEXIDINE GLUCONATE 1 APPLICATION(S): 213 SOLUTION TOPICAL at 06:24

## 2018-07-31 RX ADMIN — HYDROMORPHONE HYDROCHLORIDE 0.5 MILLIGRAM(S): 2 INJECTION INTRAMUSCULAR; INTRAVENOUS; SUBCUTANEOUS at 20:14

## 2018-07-31 RX ADMIN — Medication 0.5 MILLIGRAM(S): at 06:41

## 2018-07-31 RX ADMIN — DEXMEDETOMIDINE HYDROCHLORIDE IN 0.9% SODIUM CHLORIDE 2.52 MICROGRAM(S)/KG/HR: 4 INJECTION INTRAVENOUS at 13:39

## 2018-07-31 RX ADMIN — HEPARIN SODIUM 5000 UNIT(S): 5000 INJECTION INTRAVENOUS; SUBCUTANEOUS at 14:16

## 2018-07-31 RX ADMIN — Medication 100 MILLIEQUIVALENT(S): at 14:43

## 2018-07-31 RX ADMIN — Medication 100 MILLIGRAM(S): at 05:42

## 2018-07-31 RX ADMIN — Medication 400 MILLIGRAM(S): at 18:13

## 2018-07-31 RX ADMIN — HYDROMORPHONE HYDROCHLORIDE 0.5 MILLIGRAM(S): 2 INJECTION INTRAMUSCULAR; INTRAVENOUS; SUBCUTANEOUS at 01:00

## 2018-07-31 RX ADMIN — FAMOTIDINE 20 MILLIGRAM(S): 10 INJECTION INTRAVENOUS at 05:42

## 2018-07-31 RX ADMIN — Medication 3 MILLIGRAM(S): at 22:42

## 2018-07-31 RX ADMIN — QUETIAPINE FUMARATE 50 MILLIGRAM(S): 200 TABLET, FILM COATED ORAL at 22:56

## 2018-07-31 RX ADMIN — Medication 4: at 11:12

## 2018-07-31 RX ADMIN — Medication 0.25 MILLIGRAM(S): at 22:41

## 2018-07-31 RX ADMIN — LIDOCAINE 1 PATCH: 4 CREAM TOPICAL at 23:59

## 2018-07-31 RX ADMIN — CEFTAROLINE FOSAMIL 50 MILLIGRAM(S): 600 POWDER, FOR SOLUTION INTRAVENOUS at 05:41

## 2018-07-31 RX ADMIN — MIRTAZAPINE 45 MILLIGRAM(S): 45 TABLET, ORALLY DISINTEGRATING ORAL at 22:56

## 2018-07-31 RX ADMIN — HEPARIN SODIUM 9 UNIT(S)/HR: 5000 INJECTION INTRAVENOUS; SUBCUTANEOUS at 01:34

## 2018-07-31 RX ADMIN — Medication 0.25 MILLIGRAM(S): at 10:47

## 2018-07-31 RX ADMIN — Medication 100 MILLIGRAM(S): at 22:42

## 2018-07-31 RX ADMIN — Medication 100 MILLIEQUIVALENT(S): at 15:52

## 2018-07-31 RX ADMIN — Medication 50 MILLIGRAM(S): at 22:41

## 2018-07-31 RX ADMIN — DEXMEDETOMIDINE HYDROCHLORIDE IN 0.9% SODIUM CHLORIDE 2.52 MICROGRAM(S)/KG/HR: 4 INJECTION INTRAVENOUS at 08:03

## 2018-07-31 RX ADMIN — DAPTOMYCIN 120 MILLIGRAM(S): 500 INJECTION, POWDER, LYOPHILIZED, FOR SOLUTION INTRAVENOUS at 11:01

## 2018-07-31 RX ADMIN — Medication 3 MILLILITER(S): at 17:31

## 2018-07-31 RX ADMIN — SENNA PLUS 2 TABLET(S): 8.6 TABLET ORAL at 22:42

## 2018-07-31 RX ADMIN — HYDROMORPHONE HYDROCHLORIDE 0.5 MILLIGRAM(S): 2 INJECTION INTRAMUSCULAR; INTRAVENOUS; SUBCUTANEOUS at 20:30

## 2018-07-31 RX ADMIN — Medication 200 MILLIGRAM(S): at 03:54

## 2018-07-31 RX ADMIN — Medication 0.5 MILLIGRAM(S): at 17:31

## 2018-07-31 NOTE — PROGRESS NOTE ADULT - SUBJECTIVE AND OBJECTIVE BOX
68yFemale a/w right septic hip, found to have a chronic L2 compression fracture. Patient is recently extubated and aggitated. Does not cooperate with an exam.         T(C): 37.1 (07-31-18 @ 11:00)  HR: 110 (07-31-18 @ 12:00)  BP: --  RR: 39 (07-31-18 @ 12:00)  SpO2: 100% (07-31-18 @ 12:00)  Wt(kg): --                        7.2    7.4   )-----------( 217      ( 31 Jul 2018 06:56 )             21.3     07-31    138  |  105  |  36<H>  ----------------------------<  248<H>  3.7   |  17<L>  |  0.79    Ca    7.6<L>      31 Jul 2018 06:48  Phos  3.3     07-31  Mg     2.4     07-31      PT/INR - ( 31 Jul 2018 00:46 )   PT: 12.4 sec;   INR: 1.13 ratio         PTT - ( 31 Jul 2018 06:53 )  PTT:27.4 sec    Gen: altered, does not cooperate with an exam  Resp: Unlabored breathing  Spine PE:  Negative clonus  No saddle anesthesia    Motor:   Moving all 4 extremities    CT: Chronic L2 compression fracture

## 2018-07-31 NOTE — PROGRESS NOTE ADULT - ASSESSMENT
68 year old female presenting with symptomatic hypoglycemia and septic shock secondary to an infected right hip s/p right hip I&D with hemiarthroplasty revision 7/27.    PLAN:  Neuro: acute post-op pain; anxiety; depression; Alzheimer's dementia  - Precedex for agitation at night. Wean in AM.  - Dilaudid PRN pain.  - Continue home Zoloft, Seroquel, Remeron    Resp:  asthma  - Monitor pulse oximeter.  - Pulmicort and Duoneb for asthma.  - CTA chest to R/O PE.     CV: tachycardia, hypertension, elevated troponin  - Trend cardiac enzymes  - Monitor vital signs.  - CTA chest to R/O PE.     GI: no acute issues  - NPO while pending CTA chest  - Protonix for GERD.    Renal: CKD stage 2  - Monitor I&Os.  - Monitor electrolytes and replete as necessary.  - Plasmalyte @ 50 mL/hr     Heme: r/o PE  - Heparin drip for empiric treatment of PE while awaiting CTA chest    ID: MRSA bacteremia, infected right hip s/p I&D  - Monitor WBC, temperature, and procalcitonin.  - Follow up OR and repeat blood cultures. Reculture daily until negative blood cultures.  - Daptomycin and ceftaroline for persistent MRSA bacteremia.   - Will need MRI to rule out lumbar epidural abscess.  - Ortho planning on RTOR Thursday or Friday for explantation of hardware.     Endo: hyperglycemia, adrenal insufficiency  - Stress dose steroids for septic shock as patient is chronically on prednisone for her asthma.  - Holding taper as pt receiving 200mg hydrocortisone IV this morning for premedication for IV contrast.    Musc: infected right hip s/p I&D 7/27  - WBAT RLE and foot flat weight bear LLE.  - RTOR Thursday or Friday for removal of hardware  - Follow up with spine regarding L2 fracture.    Disposition:  Full code. Will remain in SICU for respiratory and hemodynamic monitoring.    -Denia Maddox PA-C  [id number]      - Scar Worley PA-C 68 year old female presenting with symptomatic hypoglycemia and septic shock secondary to an infected right hip s/p right hip I&D with hemiarthroplasty revision 7/27.    PLAN:  Neuro: acute post-op pain; anxiety; depression; Alzheimer's dementia  - Precedex for agitation at night. Wean in AM.  - Dilaudid PRN pain.  - Continue home Zoloft, Seroquel, Remeron    Resp:  asthma  - Monitor pulse oximeter.  - Pulmicort and Duoneb for asthma.  - CTA chest to R/O PE.     CV: tachycardia, hypertension, elevated troponin  - Trend cardiac enzymes  - Monitor vital signs.  - CTA chest to R/O PE.     GI: no acute issues  - NPO while pending CTA chest  - Protonix for GERD.    Renal: CKD stage 2  - Monitor I&Os.  - Monitor electrolytes and replete as necessary.  - Plasmalyte @ 50 mL/hr     Heme.  VTE prophylaxis    ID: MRSA bacteremia, infected right hip s/p I&D  - Monitor WBC, temperature, and procalcitonin.  - Follow up OR and repeat blood cultures. Reculture daily until negative blood cultures.  - Daptomycin and ceftaroline for persistent MRSA bacteremia.   - Will need MRI to rule out lumbar epidural abscess.  - Ortho planning on RTOR Thursday or Friday for explantation of hardware.     Endo: hyperglycemia, adrenal insufficiency  - Stress dose steroids for septic shock as patient is chronically on prednisone for her asthma.  - Holding taper as pt receiving 200mg hydrocortisone IV this morning for premedication for IV contrast.    Musc: infected right hip s/p I&D 7/27  - WBAT RLE and foot flat weight bear LLE.  - RTOR Thursday or Friday for removal of hardware  - Follow up with spine regarding L2 fracture.    Disposition:  Full code. Will remain in SICU for respiratory and hemodynamic monitoring.    -Denia Maddox PA-C  [id number]      - Scar Worley PA-C 68 year old female presenting with symptomatic hypoglycemia and septic shock secondary to an infected right hip s/p right hip I&D with hemiarthroplasty revision 7/27.    PLAN:  Neuro: acute post-op pain; anxiety; depression; Alzheimer's dementia  - Precedex for agitation at night. Wean in AM.  - Dilaudid PRN pain.  - Continue home Zoloft, Seroquel, Remeron    Resp:  asthma  - Monitor pulse oximeter.  - Pulmicort and Duoneb for asthma.    CV: tachycardia, hypertension, elevated troponin  - Trend cardiac enzymes  - Monitor vital signs.    GI: no acute issues  - Regular diet   - Protonix for GERD.    Renal: CKD stage 2  - Monitor I&Os.  - Monitor electrolytes and replete as necessary.  - Plasmalyte @ 50 mL/hr     Heme.  VTE prophylaxis    ID: MRSA bacteremia, infected right hip s/p I&D  - Monitor WBC, temperature, and procalcitonin.  - Follow up OR and repeat blood cultures. Reculture daily until negative blood cultures.  - Daptomycin and ceftaroline for persistent MRSA bacteremia.   - Will need MRI to rule out lumbar epidural abscess.  - Ortho planning on RTOR Thursday or Friday for explantation of hardware.     Endo: hyperglycemia, adrenal insufficiency  - Stress dose steroids for septic shock as patient is chronically on prednisone for her asthma.  - Holding taper as pt receiving 200mg hydrocortisone IV this morning for premedication for IV contrast.    Musc: infected right hip s/p I&D 7/27  - WBAT RLE and foot flat weight bear LLE.  - RTOR Thursday or Friday for removal of hardware  - Follow up with spine regarding L2 fracture.    Disposition:  Full code. Will remain in SICU for respiratory and hemodynamic monitoring.    -Denia Maddox PA-C  [id number]      - Scar Worley PA-C

## 2018-07-31 NOTE — PROVIDER CONTACT NOTE (OTHER) - RECOMMENDATIONS
Restart fluid for low urine output, suggested NPO for possible risk of aspiration, requested IV pain medication.

## 2018-07-31 NOTE — PROVIDER CONTACT NOTE (OTHER) - ASSESSMENT
Alert and oriented to self, confused, agitated on Bipap with laboured breathing, sinus tachy 110-120, c/o of pain, decrease urine output to 15cc/hr,

## 2018-07-31 NOTE — PROGRESS NOTE ADULT - SUBJECTIVE AND OBJECTIVE BOX
Dr. Arias (Attending Physician)  Patient developed tachycardia tonight up to the 110s to low 120s.  At midnight patient became more agitated and she began complaining of chest pain.  She was never hypoxic and did not have increasing oxygen requirements.  ECG showed old RBBB.  Will start heparin gtt and get CTA chest to eval for PE.  We restarted precedex gtt for agitation and gave pain medications with improvement.  Afebrile but sending lactate and will add on procalcitonin for worsening infection.    This note represents my care for the evening of 7/30 into 7/31    This patient was critically ill with one or more vital organ systems at a high probability of imminent or life threatening deterioration. Complex decision making was required to assess and treat single or multiple vital organ system failure and/or prevent further life threatening deterioration of the patient’s condition.  All recent labwork and imaging was reviewed.  Total Critical Care Time 35 minutes Dr. Arias (Attending Physician)  Patient developed tachycardia tonight up to the 110s to low 120s.  At midnight patient became more agitated and she began complaining of chest pain.  She was hypertensive, but was never hypoxic and did not have increasing oxygen requirements so I suspect his is more likely to be agitation than PE, but ECG showed old RBBB, bedside echo showed RV apical hypokinesis, and patient has ho PE in the past.  Will start heparin gtt and get CTA chest to eval for PE, patient requires pretreatment for contrast allergy and  would like us to discuss with her pulmonologist/pmd in the am.  We restarted precedex gtt for agitation and gave pain medications with improvement.  Afebrile but sending lactate and will add on procalcitonin to eval for worsening infection.    This note represents my care for the evening of 7/30 into 7/31    This patient was critically ill with one or more vital organ systems at a high probability of imminent or life threatening deterioration. Complex decision making was required to assess and treat single or multiple vital organ system failure and/or prevent further life threatening deterioration of the patient’s condition.  All recent labwork and imaging was reviewed.  Total Critical Care Time 40 minutes

## 2018-07-31 NOTE — PROGRESS NOTE ADULT - ASSESSMENT
68F s/p R hip I&D with exchange of femoral component  and L2 compression fracture  Analgesia  DVT ppx  WBAT RLE  Flat foot weight bearing LLE for acetabulum fx  Can get OOB as tolerated, does not need spine precautions, LSO for comfort  FU CT Angio this AM  FU OR and blood cultures  Apprec ID recs  PT/OT  Plan for OR Friday for explant R hip prosthesis with antibiotics spacer, possible girdlestone

## 2018-07-31 NOTE — PROGRESS NOTE ADULT - ASSESSMENT
69 yo female with dementia, history of UC, and s/p RT Hip hemiarthroplasty 6/22/18  postsurgical hematoma  admitted with low grade fever, rigors, increased confusion, found to have leukocytosis, 20% bands, ICU, pressor use   Bld Cxs 7/26-7/29 all MRSA  s/p R hip I&D, lavage, poly exchange- pus encountered, all specimens S aureus  Remains on Vent,  Afebrile, Creat stable  Now on Dapto and ceftaroline, blood cult from 7/30 --pending    Plan:  cont combo Tx with Dapto and Ceftaroline for now  downgrade to Dapto/Rifampin might be an option once bacteremia controlled  will need eventual PICC line once bacteremia is controlled for long term antibiotics  f/u repeat blood cult  awaiting GAYATHRI  source control remains most important issue,   Ortho input appreciated, plans for OR on Friday for removal of hardware: spacer vs griddle stone 69 yo female with dementia, history of UC, and s/p RT Hip hemiarthroplasty 6/22/18  postsurgical hematoma  admitted with low grade fever, rigors, increased confusion, found to have leukocytosis, 20% bands, ICU, pressor use   Bld Cxs 7/26-7/29 all MRSA  s/p R hip I&D, lavage, poly exchange- pus encountered, all specimens S aureus  Remains on Vent,  Afebrile, Creat stable  Now on Dapto and ceftaroline, blood cult from 7/30 --pending    Plan:  cont combo Tx with Dapto and Ceftaroline for now  downgrade to Dapto/Rifampin might be an option once bacteremia controlled  will need eventual PICC line once bacteremia is controlled for long term antibiotics  f/u repeat blood cult  awaiting GAYATHRI  source control remains most important issue, reviewed with family at bedside3  Ortho input appreciated, plans for OR on Friday for removal of hardware: spacer vs griddle stone

## 2018-07-31 NOTE — PROGRESS NOTE ADULT - SUBJECTIVE AND OBJECTIVE BOX
Pt S/E at bedside, no acute events overnight, pain controlled. pt had episode of tachycardia/SOB overnight. Started on heparin drip for possible PE. Plan for CT Angio this AM.    Vital Signs Last 24 Hrs  T(C): 37.3 (30 Jul 2018 23:00), Max: 37.3 (30 Jul 2018 15:00)  T(F): 99.1 (30 Jul 2018 23:00), Max: 99.1 (30 Jul 2018 15:00)  HR: 79 (31 Jul 2018 05:00) (73 - 133)  BP: 123/76 (30 Jul 2018 07:30) (123/76 - 124/76)  BP(mean): 95 (30 Jul 2018 07:30) (95 - 95)  RR: 15 (31 Jul 2018 05:00) (13 - 32)  SpO2: 100% (31 Jul 2018 05:00) (95% - 100%)    Gen: NAD, awake/alert    Right Lower Extremity:  Dressing clean dry intact, +abd pillow  Unable to fully assess motor/sensory 2/2 mental status  Grossly moving R foot  +DP/PT Pulses  Compartments soft  No calf TTP B/L

## 2018-07-31 NOTE — PROGRESS NOTE ADULT - SUBJECTIVE AND OBJECTIVE BOX
---___---___---___---___---___---___ ---___---___---___---___---___---___---___---___---___---                    <<<  M E D I C A L   A T T E N D I N G    F O L L O W    U P   N O T E  >>>    still in bed has event last night was agitated and tachjycardic . heparin increase for suspicion of pe      ---___---___---___---___---___---      <<<  MEDICATIONS:  >>>    MEDICATIONS  (STANDING):  buDESOnide   0.5 milliGRAM(s) Respule 0.5 milliGRAM(s) Inhalation every 12 hours  Ceftaroline Fosamil IVPB 600 milliGRAM(s) IV Intermittent every 12 hours  chlorhexidine 4% Liquid 1 Application(s) Topical <User Schedule>  DAPTOmycin IVPB      DAPTOmycin IVPB 500 milliGRAM(s) IV Intermittent every 24 hours  dexmedetomidine Infusion 0.2 MICROgram(s)/kG/Hr (2.52 mL/Hr) IV Continuous <Continuous>  docusate sodium 100 milliGRAM(s) Oral three times a day  famotidine    Tablet 20 milliGRAM(s) Oral two times a day  FIRST- Mouthwash  BLM 5 milliLiter(s) Swish and Spit daily  heparin  Injectable 5000 Unit(s) SubCutaneous every 8 hours  hydrocortisone sodium succinate Injectable 50 milliGRAM(s) IV Push every 8 hours  hydrocortisone sodium succinate Injectable 50 milliGRAM(s) IV Push every 12 hours  insulin lispro (HumaLOG) corrective regimen sliding scale   SubCutaneous every 4 hours  mirtazapine 45 milliGRAM(s) Oral <User Schedule>  multiple electrolytes Injection Type 1 1000 milliLiter(s) (50 mL/Hr) IV Continuous <Continuous>  QUEtiapine 50 milliGRAM(s) Oral <User Schedule>  senna 2 Tablet(s) Oral at bedtime  sertraline 50 milliGRAM(s) Oral daily      MEDICATIONS  (PRN):  acetaminophen   Tablet. 975 milliGRAM(s) Oral every 6 hours PRN Mild Pain (1 - 3)  ALBUTerol/ipratropium for Nebulization 3 milliLiter(s) Nebulizer every 6 hours PRN Shortness of Breath and/or Wheezing  traMADol 50 milliGRAM(s) Oral every 6 hours PRN Severe Pain (7 - 10)       ---___---___---___---___---___---     <<<REVIEW OF SYSTEM: >>>  unable top obatin     ---___---___---___---___---___---          <<<  VITAL SIGNS: >>>    T(F): 99.1 (07-31-18 @ 03:00), Max: 99.1 (07-30-18 @ 15:00)  HR: 93 (07-31-18 @ 07:00) (76 - 133)  BP: --  RR: 21 (07-31-18 @ 07:00) (13 - 32)  SpO2: 100% (07-31-18 @ 07:00) (95% - 100%)  Wt(kg): --  CAPILLARY BLOOD GLUCOSE      POCT Blood Glucose.: 208 mg/dL (30 Jul 2018 13:42)    I&O's Summary    30 Jul 2018 07:01  -  31 Jul 2018 07:00  --------------------------------------------------------  IN: 2496 mL / OUT: 2440 mL / NET: 56 mL         ---___---___---___---___---___---                       PHYSICAL EXAM:    GEN: A&O X 1 , NAD , comfortable  HEENT: NCAT, PERRL, MMM, no scleral icterus, hearing intact  NECK: Supple, No JVD  CVS: S1S2 , regular , No M/R/G appreciated  PULM: CTA B/L,  no W/R/R appreciated  ABD.: soft. non tender, non distended,  bowel sounds present  Extrem:  drains in the right lower extremity  Derm: No rash or ecchymosis noted        ---___---___---___---___---___---     <<<  LAB AND IMAGING: >>>                          7.2    7.4   )-----------( 217      ( 31 Jul 2018 06:56 )             21.3               07-31    137  |  105  |  37<H>  ----------------------------<  224<H>  4.1   |  16<L>  |  0.76    Ca    7.6<L>      31 Jul 2018 00:46  Phos  3.4     07-31  Mg     2.4     07-31      PT/INR - ( 31 Jul 2018 00:46 )   PT: 12.4 sec;   INR: 1.13 ratio         PTT - ( 31 Jul 2018 06:53 )  PTT:27.4 sec         CARDIAC MARKERS ( 31 Jul 2018 00:46 )  x     / x     / 37 U/L / x     / 3.9 ng/mL            ABG - ( 31 Jul 2018 06:39 )  pH, Arterial: 7.47  pH, Blood: x     /  pCO2: 24    /  pO2: 144   / HCO3: 18    / Base Excess: -5.1  /  SaO2: 100                     [All pertinent / recent available Imaging reports and other labs reviewed]     ---___---___---___---___---___---___ ---___---___---___---___---           <<<  A S S E S S M E N T   A N D   P L A N :  >>>          -GI/DVT Prophylaxis.    --------------------------------------------  Case discussed with with  mr roque  Education given on     >>______________________<<      Deniz Bolden .         phone   [phone]

## 2018-07-31 NOTE — PROGRESS NOTE ADULT - SUBJECTIVE AND OBJECTIVE BOX
CC: f/u for high grade MRSA bacteremia    Pt remains sedated on Vent, events of last night noted  ICU notes reviewed, ?PE, +troponins    REVIEW OF SYSTEMS:  Not proviede on Vent    Antimicrobials Day # 6  Ceftaroline Fosamil IVPB 600 milliGRAM(s) IV Intermittent every 12 hours  DAPTOmycin IVPB      DAPTOmycin IVPB 500 milliGRAM(s) IV Intermittent every 24 hours      Medications Reviewed    ICU Vital Signs Last 24 Hrs  T(C): 37.3 (31 Jul 2018 03:00), Max: 37.3 (30 Jul 2018 15:00)  T(F): 99.1 (31 Jul 2018 03:00), Max: 99.1 (30 Jul 2018 15:00)  HR: 103 (31 Jul 2018 09:00) (76 - 133)  BP: --  BP(mean): --  ABP: 138/73 (31 Jul 2018 09:00) (114/55 - 159/85)  ABP(mean): 103 (31 Jul 2018 09:00) (79 - 119)  RR: 23 (31 Jul 2018 09:00) (13 - 32)  SpO2: 100% (31 Jul 2018 09:00) (95% - 100%)        PHYSICAL EXAM:  General: no acute distress on Vent  Eyes:  anicteric, no conjunctival injection, no discharge  Oropharynx: ETT	  Neck: LIJ CVL  Lungs: clear to auscultation  Heart: regular rate and rhythm; no murmur, rubs or gallops  Abdomen: soft, nondistended, nontender, without mass or organomegaly  Blackman  Skin: R hip incision dressing intact; hemovac x 2  Extremities: no edema  Neurologic: sedated    LAB RESULTS:                                              7.2    7.4   )-----------( 217      ( 31 Jul 2018 06:56 )             21.3   07-31    138  |  105  |  36<H>  ----------------------------<  248<H>  3.7   |  17<L>  |  0.79    Ca    7.6<L>      31 Jul 2018 06:48  Phos  3.3     07-31  Mg     2.4     07-31          MICROBIOLOGY:  RECENT CULTURES REVIEWED        RADIOLOGY REVIEWED CC: f/u for high grade sustained MRSA bacteremia    Pt remains sedated on Vent, events of last night noted  ICU notes reviewed, ?PE, +troponins    REVIEW OF SYSTEMS:  Not proviede on Vent    Antimicrobials Day # 6  Ceftaroline Fosamil IVPB 600 milliGRAM(s) IV Intermittent every 12 hours  DAPTOmycin IVPB      DAPTOmycin IVPB 500 milliGRAM(s) IV Intermittent every 24 hours      Medications Reviewed    ICU Vital Signs Last 24 Hrs  T(C): 37.3 (31 Jul 2018 03:00), Max: 37.3 (30 Jul 2018 15:00)  T(F): 99.1 (31 Jul 2018 03:00), Max: 99.1 (30 Jul 2018 15:00)  HR: 103 (31 Jul 2018 09:00) (76 - 133)  BP: --  BP(mean): --  ABP: 138/73 (31 Jul 2018 09:00) (114/55 - 159/85)  ABP(mean): 103 (31 Jul 2018 09:00) (79 - 119)  RR: 23 (31 Jul 2018 09:00) (13 - 32)  SpO2: 100% (31 Jul 2018 09:00) (95% - 100%)        PHYSICAL EXAM:  General: no acute distress, now extubated, restless  Eyes:  anicteric, no conjunctival injection, no discharge  Oropharynx: clear, no exudate  Neck: LIJ CVL  Lungs: clear to auscultation  Heart: regular rate and rhythm; no murmur, rubs or gallops  Abdomen: soft, nondistended, nontender, without mass or organomegaly  Blackman  Skin: R hip incision dressing intact; hemovac x 2  Extremities: no edema  Neurologic: awake, but restless, follows simple commands    LAB RESULTS:                                              7.2    7.4   )-----------( 217      ( 31 Jul 2018 06:56 )             21.3   07-31    138  |  105  |  36<H>  ----------------------------<  248<H>  3.7   |  17<L>  |  0.79    Ca    7.6<L>      31 Jul 2018 06:48  Phos  3.3     07-31  Mg     2.4     07-31          MICROBIOLOGY:  RECENT CULTURES REVIEWED        RADIOLOGY REVIEWED

## 2018-07-31 NOTE — PROGRESS NOTE ADULT - ATTENDING COMMENTS
I saw and evaluated patient.  I agree with above note.    The patient was extubated yesterday.  showing breathing abnormality with intermittent tachypnea.    baseline dementia and now with aggravated delirium  anticipate needing Precedex at night plus neuroleptic medications that the patient takes at home  aggressive pulmonary optimization reviewed with nursing including mobilization of the critical ill patient in hopes of preventing re intubation  35 minutes of critical care management applied

## 2018-07-31 NOTE — PROGRESS NOTE ADULT - SUBJECTIVE AND OBJECTIVE BOX
24h:  Pt was extubated on Precedex. She was started on a bicarb drip for RTA and advanced to regular diet. Steroid taper initiated. Overnight she appeared to have labored breathing and a respiratory alkalosis on ABG. Bicarb drip was D/Alex and switched to plasmalyte. She then became acutely tachycardic to 150 and hypertensive complaining of chest pain, but did not become hypoxic. Given ECG showed old RBBB, bedside echo showed RV apical hypokinesis, and patient has ho PE in the past, decision was made to start heparin drip. CTA chest to R/O PE to be obtained in AM after premedication for IV contrast allergy (anaphylaxis as per ). Precedex drip restarted for agitation. 24 HOUR EVENTS: Pt was extubated on Precedex. She was started on a bicarb drip for RTA and advanced to regular diet. Steroid taper initiated. Overnight she appeared to have labored breathing and a respiratory alkalosis on ABG. Bicarb drip was D/Alex and switched to plasmalyte. She then became acutely tachycardic to 150 and hypertensive complaining of chest pain, but did not become hypoxic. Given ECG showed old RBBB, bedside echo showed RV apical hypokinesis, and patient has h/o PE in the past, decision was made to start heparin drip. CTA chest to R/O PE to be obtained in AM after premedication for IV contrast allergy (anaphylaxis as per ). Precedex drip restarted for agitation.  Troponin elevated at 93, to trend.      SUBJECTIVE/ROS:  [x ] A ten-point review of systems was otherwise negative except as noted.  [ ] Due to altered mental status/intubation, subjective information were not able to be obtained from the patient. History was obtained, to the extent possible, from review of the chart and collateral sources of information.      NEURO  Exam: awake, alert, oriented x2 (Self, place)  Meds: acetaminophen   Tablet. 975 milliGRAM(s) Oral every 6 hours PRN Mild Pain (1 - 3)  dexmedetomidine Infusion 0.2 MICROgram(s)/kG/Hr IV Continuous <Continuous>  mirtazapine 45 milliGRAM(s) Oral <User Schedule>  QUEtiapine 50 milliGRAM(s) Oral <User Schedule>  sertraline 50 milliGRAM(s) Oral daily  traMADol 50 milliGRAM(s) Oral every 6 hours PRN Severe Pain (7 - 10)    [x] Adequacy of sedation and pain control has been assessed and adjusted      RESPIRATORY  RR: 24 (07-31-18 @ 04:00) (11 - 32)  SpO2: 100% (07-31-18 @ 04:00) (93% - 100%)  Exam: unlabored, clear to auscultation bilaterally  Mechanical Ventilation: Mode: CPAP with PS, RR (patient): 22, FiO2: 30, PEEP: 5, PS: 5, MAP: 7  ABG - ( 31 Jul 2018 00:39 )  pH: 7.45  /  pCO2: 24    /  pO2: 89    / HCO3: 17    / Base Excess: -6.1  /  SaO2: 98      Blood Gas Arterial, Lactate: 1.0 mmol/L (07.31.18 @ 00:39)    [n/a ] Extubation Readiness Assessed  Meds: ALBUTerol/ipratropium for Nebulization 3 milliLiter(s) Nebulizer every 6 hours PRN Shortness of Breath and/or Wheezing  buDESOnide   0.5 milliGRAM(s) Respule 0.5 milliGRAM(s) Inhalation every 12 hours        CARDIOVASCULAR  HR: 93 (07-31-18 @ 04:00) (73 - 133)  BP: 123/76 (07-30-18 @ 07:30) (123/76 - 124/76)  BP(mean): 95 (07-30-18 @ 07:30) (95 - 95)  ABP: 131/68 (07-31-18 @ 04:00) (114/55 - 159/85)  ABP(mean): 97 (07-31-18 @ 04:00) (79 - 119)    Exam: regular rate and rhythm  Cardiac Rhythm: sinus  Perfusion     [x]Adequate   [ ]Inadequate  Mentation   [x ]Normal       [ ]Reduced  Extremities  [x ]Warm         [ ]Cool  Volume Status [ ]Hypervolemic [x ]Euvolemic [ ]Hypovolemic  Meds: x      GI/NUTRITION  Exam: soft, nontender, nondistended  Diet: NPO  Meds: docusate sodium 100 milliGRAM(s) Oral three times a day  famotidine    Tablet 20 milliGRAM(s) Oral two times a day  senna 2 Tablet(s) Oral at bedtime      GENITOURINARY  I&O's Detail    07-29 @ 07:01 - 07-30 @ 07:00  --------------------------------------------------------  IN:    dexmedetomidine Infusion: 355.9 mL    DOPamine Infusion: 30.8 mL    IV PiggyBack: 500 mL    sodium chloride 0.9%: 2200 mL    Vital HN: 200 mL  Total IN: 3286.7 mL    OUT:    Accordian: 60 mL    Accordian: 220 mL    Indwelling Catheter - Urethral: 915 mL  Total OUT: 1195 mL    Total NET: 2091.7 mL      07-30 @ 07:01  -  07-31 @ 04:28  --------------------------------------------------------  IN:    dexmedetomidine Infusion: 100.9 mL    dexmedetomidine Infusion: 22.5 mL    dextrose 5%: 525 mL    dextrose 5% + sodium chloride 0.45%: 200 mL    heparin Infusion: 36 mL    IV PiggyBack: 250 mL    multiple electrolytes Injection Type 1: 350 mL    propofol Infusion: 12.1 mL    sodium chloride 0.9%: 300 mL    Solution: 200 mL    Solution: 250 mL  Total IN: 2246.5 mL    OUT:    Accordian: 10 mL    Accordian: 100 mL    Indwelling Catheter - Urethral: 2065 mL  Total OUT: 2175 mL    Total NET: 71.5 mL      07-31    137  |  105  |  37<H>  ----------------------------<  224<H>  4.1   |  16<L>  |  0.76    Ca    7.6<L>      31 Jul 2018 00:46  Phos  3.4     07-31  Mg     2.4     07-31      [x ] Blackman catheter, indication: critically ill  Meds: multiple electrolytes Injection Type 1 1000 milliLiter(s) IV Continuous <Continuous>        HEMATOLOGIC  Meds: heparin  Infusion 900 Unit(s)/Hr IV Continuous <Continuous>    [x] VTE Prophylaxis                        7.6    9.8   )-----------( 240      ( 31 Jul 2018 00:46 )             22.2     PT/INR - ( 31 Jul 2018 00:46 )   PT: 12.4 sec;   INR: 1.13 ratio         PTT - ( 31 Jul 2018 00:46 )  PTT:29.3 sec  Transfusion     [ ] PRBC   [ ] Platelets   [ ] FFP   [ ] Cryoprecipitate      INFECTIOUS DISEASES  T(C): 37.3 (07-30-18 @ 23:00), Max: 37.3 (07-30-18 @ 15:00)  WBC Count: 9.8 K/uL (07-31 @ 00:46)  WBC Count: 9.1 K/uL (07-30 @ 12:02)    Recent Cultures:  Specimen Source: .Blood Blood-Venous, 07-29 @ 08:29; Results   Growth in aerobic bottle: Gram Positive Cocci in Clusters  Growth in anaerobic bottle: Gram Positive Cocci in Clusters; Gram Stain:   Growth in aerobic bottle: Gram Positive Cocci in Clusters  Growth in anaerobic bottle: Gram Positive Cocci in Clusters; Organism: --  Specimen Source: .Blood Blood-Venous, 07-29 @ 06:52; Results   Growth in aerobic and anaerobic bottles: Methicillin resistant  Staphylococcus aureus  See previous culture [id number]; Gram Stain:   Growth in anaerobic bottle: Gram Positive Cocci in Clusters  Growth in aerobic bottle:  Gram Positive Cocci in Clusters; Organism: --  Specimen Source: .Blood Blood, 07-28 @ 05:27; Results   Growth in aerobic and anaerobic bottles: Methicillin resistant  Staphylococcus aureus  See previous culture [id number]; Gram Stain:   Growth in aerobic bottle: Gram Positive Cocci in Clusters  Growth in anaerobic bottle: Gram Positive Cocci in Clusters; Organism: --  Specimen Source: .Tissue Other, right hip fluid superficial, 07-27 @ 22:56; Results   Testing in progress; Gram Stain: --; Organism: --  Specimen Source: .Tissue 1 right hip fluid superficial, 07-27 @ 22:54; Results   Moderate Methicillin resistant Staphylococcus aureus; Gram Stain:   No polymorphonuclear cells seen per low power field  Rare Gram variable coccobacilli per oil power field; Organism: Methicillin resistant Staphylococcus aureus  Specimen Source: .Other Other, right hip fluid deep, 07-27 @ 22:34; Results   Testing in progress; Gram Stain: --; Organism: Methicillin resistant Staphylococcus aureus  Specimen Source: .Tissue Other, 2 right hip tissue, 07-27 @ 21:26; Results   Testing in progress; Gram Stain:   No polymorphonuclear cells seen per low power field  No organisms seen per oil power field; Organism: Methicillin resistant Staphylococcus aureus  Specimen Source: .Tissue Other, right hip soft tissue, 07-27 @ 21:22; Results   Few Methicillin resistant Staphylococcus aureus; Gram Stain:   No polymorphonuclear cells seen per low power field  No organisms seen per oil power field; Organism: Methicillin resistant Staphylococcus aureus  Specimen Source: .Blood Blood, 07-27 @ 08:24; Results   Growth in aerobic and anaerobic bottles: Methicillin resistant  Staphylococcus aureus  See previous culture [id number]; Gram Stain:   Growth in anaerobic bottle: Gram Positive Cocci in Clusters  Growth in aerobic bottle: Gram Positive Cocci in Clusters; Organism: --  Specimen Source: .Urine Catheterized, 07-26 @ 16:29; Results   No growth; Gram Stain: --; Organism: --  Specimen Source: .Blood Blood-Peripheral, 07-26 @ 14:43; Results   Growth in aerobic and anaerobic bottles:  Methicillin resistant Staphylococcus aureus  See previous culture [id number]; Gram Stain:   Growth in aerobic and anaerobic bottles: Gram Positive Cocci in Clusters; Organism: Blood Culture PCR  Methicillin resistant Staphylococcus aureus    Meds: Ceftaroline Fosamil IVPB 600 milliGRAM(s) IV Intermittent every 12 hours  DAPTOmycin IVPB      DAPTOmycin IVPB 500 milliGRAM(s) IV Intermittent every 24 hours        ENDOCRINE  Capillary Blood Glucose  POCT Blood Glucose.: 208 mg/dL (30 Jul 2018 13:42)  POCT Blood Glucose.: 183 mg/dL (30 Jul 2018 09:58)  POCT Blood Glucose.: 165 mg/dL (30 Jul 2018 05:39)    Meds: hydrocortisone sodium succinate Injectable 50 milliGRAM(s) IV Push every 8 hours  hydrocortisone sodium succinate Injectable 50 milliGRAM(s) IV Push every 12 hours  insulin lispro (HumaLOG) corrective regimen sliding scale   SubCutaneous three times a day before meals  insulin lispro (HumaLOG) corrective regimen sliding scale   SubCutaneous at bedtime        ACCESS DEVICES:  [ x] Peripheral IV  [ x] Central Venous Line	[ ] R	[ x] L	[ x] IJ	[ ] Fem	[ ] SC	Placed: 7/27  [x ] Arterial Line		[x ] R	[ ] L	[ ] Fem	[ ] Rad	[x ] Ax	Placed: 7/27  [ ] PICC:					[ ] Mediport  [x ] Urinary Catheter, Date Placed: 7/26  [x ] Necessity of urinary, arterial, and venous catheters discussed    OTHER MEDICATIONS:  chlorhexidine 4% Liquid 1 Application(s) Topical <User Schedule>  FIRST- Mouthwash  BLM 5 milliLiter(s) Swish and Spit daily      CODE STATUS: full code    IMAGING: x 24 HOUR EVENTS: Pt was extubated on Precedex. She was started on a bicarb drip for RTA and advanced to regular diet. Steroid taper initiated. Overnight she appeared to have labored breathing and a respiratory alkalosis on ABG. Bicarb drip was D/Alex and switched to plasmalyte. She then became acutely tachycardic to 150 and hypertensive complaining of chest pain, but did not become hypoxic. Given ECG showed old RBBB, bedside echo showed RV apical hypokinesis. These symptoms quickly resolved without intervention.       SUBJECTIVE/ROS:  [x ] A ten-point review of systems was otherwise negative except as noted.  [ ] Due to altered mental status/intubation, subjective information were not able to be obtained from the patient. History was obtained, to the extent possible, from review of the chart and collateral sources of information.      NEURO  Exam: awake, alert, oriented x2 (Self, place)  Meds: acetaminophen   Tablet. 975 milliGRAM(s) Oral every 6 hours PRN Mild Pain (1 - 3)  dexmedetomidine Infusion 0.2 MICROgram(s)/kG/Hr IV Continuous <Continuous>  mirtazapine 45 milliGRAM(s) Oral <User Schedule>  QUEtiapine 50 milliGRAM(s) Oral <User Schedule>  sertraline 50 milliGRAM(s) Oral daily  traMADol 50 milliGRAM(s) Oral every 6 hours PRN Severe Pain (7 - 10)    [x] Adequacy of sedation and pain control has been assessed and adjusted      RESPIRATORY  RR: 24 (07-31-18 @ 04:00) (11 - 32)  SpO2: 100% (07-31-18 @ 04:00) (93% - 100%)  Exam: unlabored, clear to auscultation bilaterally  Mechanical Ventilation: Mode: CPAP with PS, RR (patient): 22, FiO2: 30, PEEP: 5, PS: 5, MAP: 7  ABG - ( 31 Jul 2018 00:39 )  pH: 7.45  /  pCO2: 24    /  pO2: 89    / HCO3: 17    / Base Excess: -6.1  /  SaO2: 98      Blood Gas Arterial, Lactate: 1.0 mmol/L (07.31.18 @ 00:39)    [n/a ] Extubation Readiness Assessed  Meds: ALBUTerol/ipratropium for Nebulization 3 milliLiter(s) Nebulizer every 6 hours PRN Shortness of Breath and/or Wheezing  buDESOnide   0.5 milliGRAM(s) Respule 0.5 milliGRAM(s) Inhalation every 12 hours        CARDIOVASCULAR  HR: 93 (07-31-18 @ 04:00) (73 - 133)  BP: 123/76 (07-30-18 @ 07:30) (123/76 - 124/76)  BP(mean): 95 (07-30-18 @ 07:30) (95 - 95)  ABP: 131/68 (07-31-18 @ 04:00) (114/55 - 159/85)  ABP(mean): 97 (07-31-18 @ 04:00) (79 - 119)    Exam: regular rate and rhythm  Cardiac Rhythm: sinus  Perfusion     [x]Adequate   [ ]Inadequate  Mentation   [x ]Normal       [ ]Reduced  Extremities  [x ]Warm         [ ]Cool  Volume Status [ ]Hypervolemic [x ]Euvolemic [ ]Hypovolemic  Meds: x      GI/NUTRITION  Exam: soft, nontender, nondistended  Diet: NPO  Meds: docusate sodium 100 milliGRAM(s) Oral three times a day  famotidine    Tablet 20 milliGRAM(s) Oral two times a day  senna 2 Tablet(s) Oral at bedtime      GENITOURINARY  I&O's Detail    07-29 @ 07:01  -  07-30 @ 07:00  --------------------------------------------------------  IN:    dexmedetomidine Infusion: 355.9 mL    DOPamine Infusion: 30.8 mL    IV PiggyBack: 500 mL    sodium chloride 0.9%: 2200 mL    Vital HN: 200 mL  Total IN: 3286.7 mL    OUT:    Accordian: 60 mL    Accordian: 220 mL    Indwelling Catheter - Urethral: 915 mL  Total OUT: 1195 mL    Total NET: 2091.7 mL      07-30 @ 07:01  -  07-31 @ 04:28  --------------------------------------------------------  IN:    dexmedetomidine Infusion: 100.9 mL    dexmedetomidine Infusion: 22.5 mL    dextrose 5%: 525 mL    dextrose 5% + sodium chloride 0.45%: 200 mL    heparin Infusion: 36 mL    IV PiggyBack: 250 mL    multiple electrolytes Injection Type 1: 350 mL    propofol Infusion: 12.1 mL    sodium chloride 0.9%: 300 mL    Solution: 200 mL    Solution: 250 mL  Total IN: 2246.5 mL    OUT:    Accordian: 10 mL    Accordian: 100 mL    Indwelling Catheter - Urethral: 2065 mL  Total OUT: 2175 mL    Total NET: 71.5 mL      07-31    137  |  105  |  37<H>  ----------------------------<  224<H>  4.1   |  16<L>  |  0.76    Ca    7.6<L>      31 Jul 2018 00:46  Phos  3.4     07-31  Mg     2.4     07-31      [x ] Blackman catheter, indication: critically ill  Meds: multiple electrolytes Injection Type 1 1000 milliLiter(s) IV Continuous <Continuous>        HEMATOLOGIC  Meds: heparin  Infusion 900 Unit(s)/Hr IV Continuous <Continuous>    [x] VTE Prophylaxis                        7.6    9.8   )-----------( 240      ( 31 Jul 2018 00:46 )             22.2     PT/INR - ( 31 Jul 2018 00:46 )   PT: 12.4 sec;   INR: 1.13 ratio         PTT - ( 31 Jul 2018 00:46 )  PTT:29.3 sec  Transfusion     [ ] PRBC   [ ] Platelets   [ ] FFP   [ ] Cryoprecipitate      INFECTIOUS DISEASES  T(C): 37.3 (07-30-18 @ 23:00), Max: 37.3 (07-30-18 @ 15:00)  WBC Count: 9.8 K/uL (07-31 @ 00:46)  WBC Count: 9.1 K/uL (07-30 @ 12:02)    Recent Cultures:  Specimen Source: .Blood Blood-Venous, 07-29 @ 08:29; Results   Growth in aerobic bottle: Gram Positive Cocci in Clusters  Growth in anaerobic bottle: Gram Positive Cocci in Clusters; Gram Stain:   Growth in aerobic bottle: Gram Positive Cocci in Clusters  Growth in anaerobic bottle: Gram Positive Cocci in Clusters; Organism: --  Specimen Source: .Blood Blood-Venous, 07-29 @ 06:52; Results   Growth in aerobic and anaerobic bottles: Methicillin resistant  Staphylococcus aureus  See previous culture [id number]; Gram Stain:   Growth in anaerobic bottle: Gram Positive Cocci in Clusters  Growth in aerobic bottle:  Gram Positive Cocci in Clusters; Organism: --  Specimen Source: .Blood Blood, 07-28 @ 05:27; Results   Growth in aerobic and anaerobic bottles: Methicillin resistant  Staphylococcus aureus  See previous culture [id number]; Gram Stain:   Growth in aerobic bottle: Gram Positive Cocci in Clusters  Growth in anaerobic bottle: Gram Positive Cocci in Clusters; Organism: --  Specimen Source: .Tissue Other, right hip fluid superficial, 07-27 @ 22:56; Results   Testing in progress; Gram Stain: --; Organism: --  Specimen Source: .Tissue 1 right hip fluid superficial, 07-27 @ 22:54; Results   Moderate Methicillin resistant Staphylococcus aureus; Gram Stain:   No polymorphonuclear cells seen per low power field  Rare Gram variable coccobacilli per oil power field; Organism: Methicillin resistant Staphylococcus aureus  Specimen Source: .Other Other, right hip fluid deep, 07-27 @ 22:34; Results   Testing in progress; Gram Stain: --; Organism: Methicillin resistant Staphylococcus aureus  Specimen Source: .Tissue Other, 2 right hip tissue, 07-27 @ 21:26; Results   Testing in progress; Gram Stain:   No polymorphonuclear cells seen per low power field  No organisms seen per oil power field; Organism: Methicillin resistant Staphylococcus aureus  Specimen Source: .Tissue Other, right hip soft tissue, 07-27 @ 21:22; Results   Few Methicillin resistant Staphylococcus aureus; Gram Stain:   No polymorphonuclear cells seen per low power field  No organisms seen per oil power field; Organism: Methicillin resistant Staphylococcus aureus  Specimen Source: .Blood Blood, 07-27 @ 08:24; Results   Growth in aerobic and anaerobic bottles: Methicillin resistant  Staphylococcus aureus  See previous culture [id number]; Gram Stain:   Growth in anaerobic bottle: Gram Positive Cocci in Clusters  Growth in aerobic bottle: Gram Positive Cocci in Clusters; Organism: --  Specimen Source: .Urine Catheterized, 07-26 @ 16:29; Results   No growth; Gram Stain: --; Organism: --  Specimen Source: .Blood Blood-Peripheral, 07-26 @ 14:43; Results   Growth in aerobic and anaerobic bottles:  Methicillin resistant Staphylococcus aureus  See previous culture [id number]; Gram Stain:   Growth in aerobic and anaerobic bottles: Gram Positive Cocci in Clusters; Organism: Blood Culture PCR  Methicillin resistant Staphylococcus aureus    Meds: Ceftaroline Fosamil IVPB 600 milliGRAM(s) IV Intermittent every 12 hours  DAPTOmycin IVPB      DAPTOmycin IVPB 500 milliGRAM(s) IV Intermittent every 24 hours        ENDOCRINE  Capillary Blood Glucose  POCT Blood Glucose.: 208 mg/dL (30 Jul 2018 13:42)  POCT Blood Glucose.: 183 mg/dL (30 Jul 2018 09:58)  POCT Blood Glucose.: 165 mg/dL (30 Jul 2018 05:39)    Meds: hydrocortisone sodium succinate Injectable 50 milliGRAM(s) IV Push every 8 hours  hydrocortisone sodium succinate Injectable 50 milliGRAM(s) IV Push every 12 hours  insulin lispro (HumaLOG) corrective regimen sliding scale   SubCutaneous three times a day before meals  insulin lispro (HumaLOG) corrective regimen sliding scale   SubCutaneous at bedtime        ACCESS DEVICES:  [ x] Peripheral IV  [ x] Central Venous Line	[ ] R	[ x] L	[ x] IJ	[ ] Fem	[ ] SC	Placed: 7/27  [x ] Arterial Line		[x ] R	[ ] L	[ ] Fem	[ ] Rad	[x ] Ax	Placed: 7/27  [ ] PICC:					[ ] Mediport  [x ] Urinary Catheter, Date Placed: 7/26  [x ] Necessity of urinary, arterial, and venous catheters discussed    OTHER MEDICATIONS:  chlorhexidine 4% Liquid 1 Application(s) Topical <User Schedule>  FIRST- Mouthwash  BLM 5 milliLiter(s) Swish and Spit daily      CODE STATUS: full code    IMAGING: x 24 HOUR EVENTS: Pt was extubated on Precedex. She was started on a bicarb drip for RTA and advanced to regular diet. Steroid taper initiated. Overnight she appeared to have labored breathing and a respiratory alkalosis on ABG. Bicarb drip was D/Alex and switched to plasmalyte. She then became acutely tachycardic to 150 and hypertensive complaining of chest pain, but did not become hypoxic. Given ECG showed old RBBB, bedside echo showed RV apical hypokinesis. These symptoms quickly resolved without intervention.       SUBJECTIVE/ROS:  [x ] A ten-point review of systems was otherwise negative except as noted.  [ ] Due to altered mental status/intubation, subjective information were not able to be obtained from the patient. History was obtained, to the extent possible, from review of the chart and collateral sources of information.      NEURO  Exam: awake, alert, oriented x2 (Self, place)  Meds: acetaminophen   Tablet. 975 milliGRAM(s) Oral every 6 hours PRN Mild Pain (1 - 3)  dexmedetomidine Infusion 0.2 MICROgram(s)/kG/Hr IV Continuous <Continuous>  mirtazapine 45 milliGRAM(s) Oral <User Schedule>  QUEtiapine 50 milliGRAM(s) Oral <User Schedule>  sertraline 50 milliGRAM(s) Oral daily  traMADol 50 milliGRAM(s) Oral every 6 hours PRN Severe Pain (7 - 10)    [x] Adequacy of sedation and pain control has been assessed and adjusted      RESPIRATORY  RR: 24 (07-31-18 @ 04:00) (11 - 32)  SpO2: 100% (07-31-18 @ 04:00) (93% - 100%)  Exam: unlabored, clear to auscultation bilaterally  Mechanical Ventilation: Mode: CPAP with PS, RR (patient): 22, FiO2: 30, PEEP: 5, PS: 5, MAP: 7  ABG - ( 31 Jul 2018 00:39 )  pH: 7.45  /  pCO2: 24    /  pO2: 89    / HCO3: 17    / Base Excess: -6.1  /  SaO2: 98      Blood Gas Arterial, Lactate: 1.0 mmol/L (07.31.18 @ 00:39)    [n/a ] Extubation Readiness Assessed  Meds: ALBUTerol/ipratropium for Nebulization 3 milliLiter(s) Nebulizer every 6 hours PRN Shortness of Breath and/or Wheezing  buDESOnide   0.5 milliGRAM(s) Respule 0.5 milliGRAM(s) Inhalation every 12 hours        CARDIOVASCULAR  HR: 93 (07-31-18 @ 04:00) (73 - 133)  BP: 123/76 (07-30-18 @ 07:30) (123/76 - 124/76)  BP(mean): 95 (07-30-18 @ 07:30) (95 - 95)  ABP: 131/68 (07-31-18 @ 04:00) (114/55 - 159/85)  ABP(mean): 97 (07-31-18 @ 04:00) (79 - 119)    Exam: regular rate and rhythm  Cardiac Rhythm: sinus  Perfusion     [x]Adequate   [ ]Inadequate  Mentation   [x ]Normal       [ ]Reduced  Extremities  [x ]Warm         [ ]Cool  Volume Status [ ]Hypervolemic [x ]Euvolemic [ ]Hypovolemic  Meds: x      GI/NUTRITION  Exam: soft, nontender, nondistended  Diet: NPO  Meds: docusate sodium 100 milliGRAM(s) Oral three times a day  famotidine    Tablet 20 milliGRAM(s) Oral two times a day  senna 2 Tablet(s) Oral at bedtime      GENITOURINARY  I&O's Detail    07-29 @ 07:01  -  07-30 @ 07:00  --------------------------------------------------------  IN:    dexmedetomidine Infusion: 355.9 mL    DOPamine Infusion: 30.8 mL    IV PiggyBack: 500 mL    sodium chloride 0.9%: 2200 mL    Vital HN: 200 mL  Total IN: 3286.7 mL    OUT:    Accordian: 60 mL    Accordian: 220 mL    Indwelling Catheter - Urethral: 915 mL  Total OUT: 1195 mL    Total NET: 2091.7 mL      07-30 @ 07:01  -  07-31 @ 04:28  --------------------------------------------------------  IN:    dexmedetomidine Infusion: 100.9 mL    dexmedetomidine Infusion: 22.5 mL    dextrose 5%: 525 mL    dextrose 5% + sodium chloride 0.45%: 200 mL    heparin Infusion: 36 mL    IV PiggyBack: 250 mL    multiple electrolytes Injection Type 1: 350 mL    propofol Infusion: 12.1 mL    sodium chloride 0.9%: 300 mL    Solution: 200 mL    Solution: 250 mL  Total IN: 2246.5 mL    OUT:    Accordian: 10 mL    Accordian: 100 mL    Indwelling Catheter - Urethral: 2065 mL  Total OUT: 2175 mL    Total NET: 71.5 mL      07-31    137  |  105  |  37<H>  ----------------------------<  224<H>  4.1   |  16<L>  |  0.76    Ca    7.6<L>      31 Jul 2018 00:46  Phos  3.4     07-31  Mg     2.4     07-31      [x ] Blackman catheter, indication: critically ill  Meds: multiple electrolytes Injection Type 1 1000 milliLiter(s) IV Continuous <Continuous>        HEMATOLOGIC  Meds: SQH 5000 units Q8H    [x] VTE Prophylaxis                        7.6    9.8   )-----------( 240      ( 31 Jul 2018 00:46 )             22.2     PT/INR - ( 31 Jul 2018 00:46 )   PT: 12.4 sec;   INR: 1.13 ratio         PTT - ( 31 Jul 2018 00:46 )  PTT:29.3 sec  Transfusion     [ ] PRBC   [ ] Platelets   [ ] FFP   [ ] Cryoprecipitate      INFECTIOUS DISEASES  T(C): 37.3 (07-30-18 @ 23:00), Max: 37.3 (07-30-18 @ 15:00)  WBC Count: 9.8 K/uL (07-31 @ 00:46)  WBC Count: 9.1 K/uL (07-30 @ 12:02)    Recent Cultures:  Specimen Source: .Blood Blood-Venous, 07-29 @ 08:29; Results   Growth in aerobic bottle: Gram Positive Cocci in Clusters  Growth in anaerobic bottle: Gram Positive Cocci in Clusters; Gram Stain:   Growth in aerobic bottle: Gram Positive Cocci in Clusters  Growth in anaerobic bottle: Gram Positive Cocci in Clusters; Organism: --  Specimen Source: .Blood Blood-Venous, 07-29 @ 06:52; Results   Growth in aerobic and anaerobic bottles: Methicillin resistant  Staphylococcus aureus  See previous culture [id number]; Gram Stain:   Growth in anaerobic bottle: Gram Positive Cocci in Clusters  Growth in aerobic bottle:  Gram Positive Cocci in Clusters; Organism: --  Specimen Source: .Blood Blood, 07-28 @ 05:27; Results   Growth in aerobic and anaerobic bottles: Methicillin resistant  Staphylococcus aureus  See previous culture 10-CB-; Gram Stain:   Growth in aerobic bottle: Gram Positive Cocci in Clusters  Growth in anaerobic bottle: Gram Positive Cocci in Clusters; Organism: --  Specimen Source: .Tissue Other, right hip fluid superficial, 07-27 @ 22:56; Results   Testing in progress; Gram Stain: --; Organism: --  Specimen Source: .Tissue 1 right hip fluid superficial, 07-27 @ 22:54; Results   Moderate Methicillin resistant Staphylococcus aureus; Gram Stain:   No polymorphonuclear cells seen per low power field  Rare Gram variable coccobacilli per oil power field; Organism: Methicillin resistant Staphylococcus aureus  Specimen Source: .Other Other, right hip fluid deep, 07-27 @ 22:34; Results   Testing in progress; Gram Stain: --; Organism: Methicillin resistant Staphylococcus aureus  Specimen Source: .Tissue Other, 2 right hip tissue, 07-27 @ 21:26; Results   Testing in progress; Gram Stain:   No polymorphonuclear cells seen per low power field  No organisms seen per oil power field; Organism: Methicillin resistant Staphylococcus aureus  Specimen Source: .Tissue Other, right hip soft tissue, 07-27 @ 21:22; Results   Few Methicillin resistant Staphylococcus aureus; Gram Stain:   No polymorphonuclear cells seen per low power field  No organisms seen per oil power field; Organism: Methicillin resistant Staphylococcus aureus  Specimen Source: .Blood Blood, 07-27 @ 08:24; Results   Growth in aerobic and anaerobic bottles: Methicillin resistant  Staphylococcus aureus  See previous culture [id number]; Gram Stain:   Growth in anaerobic bottle: Gram Positive Cocci in Clusters  Growth in aerobic bottle: Gram Positive Cocci in Clusters; Organism: --  Specimen Source: .Urine Catheterized, 07-26 @ 16:29; Results   No growth; Gram Stain: --; Organism: --  Specimen Source: .Blood Blood-Peripheral, 07-26 @ 14:43; Results   Growth in aerobic and anaerobic bottles:  Methicillin resistant Staphylococcus aureus  See previous culture [id number]; Gram Stain:   Growth in aerobic and anaerobic bottles: Gram Positive Cocci in Clusters; Organism: Blood Culture PCR  Methicillin resistant Staphylococcus aureus    Meds: Ceftaroline Fosamil IVPB 600 milliGRAM(s) IV Intermittent every 12 hours  DAPTOmycin IVPB      DAPTOmycin IVPB 500 milliGRAM(s) IV Intermittent every 24 hours        ENDOCRINE  Capillary Blood Glucose  POCT Blood Glucose.: 208 mg/dL (30 Jul 2018 13:42)  POCT Blood Glucose.: 183 mg/dL (30 Jul 2018 09:58)  POCT Blood Glucose.: 165 mg/dL (30 Jul 2018 05:39)    Meds: hydrocortisone sodium succinate Injectable 50 milliGRAM(s) IV Push every 8 hours  hydrocortisone sodium succinate Injectable 50 milliGRAM(s) IV Push every 12 hours  insulin lispro (HumaLOG) corrective regimen sliding scale   SubCutaneous three times a day before meals  insulin lispro (HumaLOG) corrective regimen sliding scale   SubCutaneous at bedtime        ACCESS DEVICES:  [ x] Peripheral IV  [ x] Central Venous Line	[ ] R	[ x] L	[ x] IJ	[ ] Fem	[ ] SC	Placed: 7/27  [x ] Arterial Line		[x ] R	[ ] L	[ ] Fem	[ ] Rad	[x ] Ax	Placed: 7/27  [ ] PICC:					[ ] Mediport  [x ] Urinary Catheter, Date Placed: 7/26  [x ] Necessity of urinary, arterial, and venous catheters discussed    OTHER MEDICATIONS:  chlorhexidine 4% Liquid 1 Application(s) Topical <User Schedule>  FIRST- Mouthwash  BLM 5 milliLiter(s) Swish and Spit daily      CODE STATUS: full code    IMAGING: x

## 2018-07-31 NOTE — PROGRESS NOTE ADULT - ASSESSMENT
68F s/p R hip I&D with exchange of femoral component POD4  Analgesia  DVT ppx  WBAT RLE  Flat foot weight bearing LLE for acetabulum fx  FU CT Angio this AM  FU OR and blood cultures  Apprec ID recs  PT/OT  Plan for OR Friday for explant R hip prosthesis with antibiotics spacer, possible girdlestone  Will discuss with attending

## 2018-08-01 LAB
ANA PAT FLD IF-IMP: ABNORMAL
ANA PAT FLD IF-IMP: ABNORMAL
ANA TITR SER: ABNORMAL
ANA TITR SER: ABNORMAL
ANION GAP SERPL CALC-SCNC: 13 MMOL/L — SIGNIFICANT CHANGE UP (ref 5–17)
BUN SERPL-MCNC: 34 MG/DL — HIGH (ref 7–23)
CALCIUM SERPL-MCNC: 7.6 MG/DL — LOW (ref 8.4–10.5)
CHLORIDE SERPL-SCNC: 107 MMOL/L — SIGNIFICANT CHANGE UP (ref 96–108)
CO2 SERPL-SCNC: 18 MMOL/L — LOW (ref 22–31)
CREAT SERPL-MCNC: 0.74 MG/DL — SIGNIFICANT CHANGE UP (ref 0.5–1.3)
CULTURE RESULTS: SIGNIFICANT CHANGE UP
DSDNA AB FLD-ACNC: <0.2 AI — SIGNIFICANT CHANGE UP
ENA SS-A AB FLD IA-ACNC: <0.2 AI — SIGNIFICANT CHANGE UP
GAS PNL BLDA: SIGNIFICANT CHANGE UP
GAS PNL BLDA: SIGNIFICANT CHANGE UP
GLUCOSE SERPL-MCNC: 192 MG/DL — HIGH (ref 70–99)
GRAM STN FLD: SIGNIFICANT CHANGE UP
HCT VFR BLD CALC: 26.2 % — LOW (ref 34.5–45)
HEMOGLOBIN INTERPRETATION: SIGNIFICANT CHANGE UP
HGB A MFR BLD: 97.7 % — SIGNIFICANT CHANGE UP (ref 95.8–98)
HGB A2 MFR BLD: 2.3 % — SIGNIFICANT CHANGE UP (ref 2–3.2)
HGB BLD-MCNC: 8.7 G/DL — LOW (ref 11.5–15.5)
MAGNESIUM SERPL-MCNC: 2.2 MG/DL — SIGNIFICANT CHANGE UP (ref 1.6–2.6)
MCHC RBC-ENTMCNC: 29.2 PG — SIGNIFICANT CHANGE UP (ref 27–34)
MCHC RBC-ENTMCNC: 33.1 GM/DL — SIGNIFICANT CHANGE UP (ref 32–36)
MCV RBC AUTO: 88.1 FL — SIGNIFICANT CHANGE UP (ref 80–100)
ORGANISM # SPEC MICROSCOPIC CNT: SIGNIFICANT CHANGE UP
PHOSPHATE SERPL-MCNC: 2.9 MG/DL — SIGNIFICANT CHANGE UP (ref 2.5–4.5)
PLATELET # BLD AUTO: 252 K/UL — SIGNIFICANT CHANGE UP (ref 150–400)
POTASSIUM SERPL-MCNC: 4.1 MMOL/L — SIGNIFICANT CHANGE UP (ref 3.5–5.3)
POTASSIUM SERPL-SCNC: 4.1 MMOL/L — SIGNIFICANT CHANGE UP (ref 3.5–5.3)
RBC # BLD: 2.97 M/UL — LOW (ref 3.8–5.2)
RBC # FLD: 17.1 % — HIGH (ref 10.3–14.5)
SODIUM SERPL-SCNC: 138 MMOL/L — SIGNIFICANT CHANGE UP (ref 135–145)
SPECIMEN SOURCE: SIGNIFICANT CHANGE UP
WBC # BLD: 10.9 K/UL — HIGH (ref 3.8–10.5)
WBC # FLD AUTO: 10.9 K/UL — HIGH (ref 3.8–10.5)

## 2018-08-01 PROCEDURE — 99291 CRITICAL CARE FIRST HOUR: CPT

## 2018-08-01 PROCEDURE — 71045 X-RAY EXAM CHEST 1 VIEW: CPT | Mod: 26

## 2018-08-01 PROCEDURE — 99232 SBSQ HOSP IP/OBS MODERATE 35: CPT | Mod: GC

## 2018-08-01 RX ORDER — OXYCODONE HYDROCHLORIDE 5 MG/1
2.5 TABLET ORAL ONCE
Qty: 0 | Refills: 0 | Status: DISCONTINUED | OUTPATIENT
Start: 2018-08-01 | End: 2018-08-01

## 2018-08-01 RX ORDER — HYDROMORPHONE HYDROCHLORIDE 2 MG/ML
0.5 INJECTION INTRAMUSCULAR; INTRAVENOUS; SUBCUTANEOUS ONCE
Qty: 0 | Refills: 0 | Status: DISCONTINUED | OUTPATIENT
Start: 2018-08-01 | End: 2018-08-01

## 2018-08-01 RX ADMIN — HEPARIN SODIUM 5000 UNIT(S): 5000 INJECTION INTRAVENOUS; SUBCUTANEOUS at 21:44

## 2018-08-01 RX ADMIN — Medication 975 MILLIGRAM(S): at 21:05

## 2018-08-01 RX ADMIN — CEFTAROLINE FOSAMIL 50 MILLIGRAM(S): 600 POWDER, FOR SOLUTION INTRAVENOUS at 18:23

## 2018-08-01 RX ADMIN — OXYCODONE HYDROCHLORIDE 2.5 MILLIGRAM(S): 5 TABLET ORAL at 21:05

## 2018-08-01 RX ADMIN — HYDROMORPHONE HYDROCHLORIDE 0.5 MILLIGRAM(S): 2 INJECTION INTRAMUSCULAR; INTRAVENOUS; SUBCUTANEOUS at 07:00

## 2018-08-01 RX ADMIN — Medication 0.25 MILLIGRAM(S): at 03:32

## 2018-08-01 RX ADMIN — Medication 25 MILLIGRAM(S): at 21:53

## 2018-08-01 RX ADMIN — Medication 100 MILLIGRAM(S): at 06:38

## 2018-08-01 RX ADMIN — TRAMADOL HYDROCHLORIDE 50 MILLIGRAM(S): 50 TABLET ORAL at 16:50

## 2018-08-01 RX ADMIN — LIDOCAINE 1 PATCH: 4 CREAM TOPICAL at 08:44

## 2018-08-01 RX ADMIN — Medication 100 MILLIGRAM(S): at 21:44

## 2018-08-01 RX ADMIN — Medication 50 MILLIGRAM(S): at 09:12

## 2018-08-01 RX ADMIN — Medication 3 MILLIGRAM(S): at 22:13

## 2018-08-01 RX ADMIN — Medication 0.5 MILLIGRAM(S): at 17:28

## 2018-08-01 RX ADMIN — MIRTAZAPINE 45 MILLIGRAM(S): 45 TABLET, ORALLY DISINTEGRATING ORAL at 21:44

## 2018-08-01 RX ADMIN — TRAMADOL HYDROCHLORIDE 50 MILLIGRAM(S): 50 TABLET ORAL at 16:19

## 2018-08-01 RX ADMIN — FAMOTIDINE 20 MILLIGRAM(S): 10 INJECTION INTRAVENOUS at 06:38

## 2018-08-01 RX ADMIN — Medication 0.5 MILLIGRAM(S): at 06:25

## 2018-08-01 RX ADMIN — SERTRALINE 50 MILLIGRAM(S): 25 TABLET, FILM COATED ORAL at 12:37

## 2018-08-01 RX ADMIN — CHLORHEXIDINE GLUCONATE 1 APPLICATION(S): 213 SOLUTION TOPICAL at 06:39

## 2018-08-01 RX ADMIN — QUETIAPINE FUMARATE 50 MILLIGRAM(S): 200 TABLET, FILM COATED ORAL at 21:44

## 2018-08-01 RX ADMIN — FAMOTIDINE 20 MILLIGRAM(S): 10 INJECTION INTRAVENOUS at 18:25

## 2018-08-01 RX ADMIN — CEFTAROLINE FOSAMIL 50 MILLIGRAM(S): 600 POWDER, FOR SOLUTION INTRAVENOUS at 06:37

## 2018-08-01 RX ADMIN — DIPHENHYDRAMINE HYDROCHLORIDE AND LIDOCAINE HYDROCHLORIDE AND ALUMINUM HYDROXIDE AND MAGNESIUM HYDRO 5 MILLILITER(S): KIT at 12:37

## 2018-08-01 RX ADMIN — DEXMEDETOMIDINE HYDROCHLORIDE IN 0.9% SODIUM CHLORIDE 2.52 MICROGRAM(S)/KG/HR: 4 INJECTION INTRAVENOUS at 21:44

## 2018-08-01 RX ADMIN — Medication 2: at 16:20

## 2018-08-01 RX ADMIN — DAPTOMYCIN 120 MILLIGRAM(S): 500 INJECTION, POWDER, LYOPHILIZED, FOR SOLUTION INTRAVENOUS at 11:19

## 2018-08-01 RX ADMIN — HEPARIN SODIUM 5000 UNIT(S): 5000 INJECTION INTRAVENOUS; SUBCUTANEOUS at 06:38

## 2018-08-01 RX ADMIN — HYDROMORPHONE HYDROCHLORIDE 0.5 MILLIGRAM(S): 2 INJECTION INTRAMUSCULAR; INTRAVENOUS; SUBCUTANEOUS at 06:38

## 2018-08-01 RX ADMIN — Medication 0.25 MILLIGRAM(S): at 21:15

## 2018-08-01 RX ADMIN — DEXMEDETOMIDINE HYDROCHLORIDE IN 0.9% SODIUM CHLORIDE 2.52 MICROGRAM(S)/KG/HR: 4 INJECTION INTRAVENOUS at 08:44

## 2018-08-01 RX ADMIN — LIDOCAINE 1 PATCH: 4 CREAM TOPICAL at 21:04

## 2018-08-01 RX ADMIN — OXYCODONE HYDROCHLORIDE 2.5 MILLIGRAM(S): 5 TABLET ORAL at 20:17

## 2018-08-01 RX ADMIN — Medication 975 MILLIGRAM(S): at 20:18

## 2018-08-01 RX ADMIN — SENNA PLUS 2 TABLET(S): 8.6 TABLET ORAL at 21:43

## 2018-08-01 RX ADMIN — Medication 2: at 06:59

## 2018-08-01 RX ADMIN — HEPARIN SODIUM 5000 UNIT(S): 5000 INJECTION INTRAVENOUS; SUBCUTANEOUS at 14:17

## 2018-08-01 NOTE — PROGRESS NOTE ADULT - SUBJECTIVE AND OBJECTIVE BOX
Ortho Progress Note    S: Patient seen and examined. Overnight was placed on BIpap and ABG improved.  was resistant to intubation.       O:  Physical Exam:  Gen: Laying in bed, confused, agitated  Resp: Bipap  Ext: moving BLE grossly          +DP, extremity WWP    Vital Signs Last 24 Hrs  T(C): 36.8 (01 Aug 2018 03:00), Max: 37.1 (31 Jul 2018 11:00)  T(F): 98.3 (01 Aug 2018 03:00), Max: 98.8 (31 Jul 2018 11:00)  HR: 79 (01 Aug 2018 05:36) (73 - 123)  BP: 156/70 (01 Aug 2018 05:36) (123/86 - 160/84)  BP(mean): 100 (01 Aug 2018 05:36) (86 - 114)  RR: 21 (01 Aug 2018 05:36) (14 - 44)  SpO2: 100% (01 Aug 2018 05:36) (93% - 100%)                          8.7    10.9  )-----------( 252      ( 01 Aug 2018 04:10 )             26.2                         8.7    13.3  )-----------( 244      ( 31 Jul 2018 14:47 )             26.0       08-01    138  |  107  |  34<H>  ----------------------------<  192<H>  4.1   |  18<L>  |  0.74        PT/INR - ( 31 Jul 2018 00:46 )   PT: 12.4 sec;   INR: 1.13 ratio         PTT - ( 31 Jul 2018 06:53 )  PTT:27.4 sec

## 2018-08-01 NOTE — PROGRESS NOTE ADULT - ASSESSMENT
68 year old female presenting with symptomatic hypoglycemia and septic shock secondary to an infected right hip s/p right hip I&D with hemiarthroplasty revision.    PLAN:    Neuro: acute post-op pain, anxiety, depression, dementia  - Monitor mental status.  - Sedation with Precedex at night   - Dilaudid PRN pain.  - Home Zoloft, Seroquel, Remeron, and melatonin.    Resp: acute post-op insufficiency, asthma  - Monitor pulse oximeter.  - BiPAP overnight   - Pulmicort and Duoneb for asthma.    CV: septic vs. cardiogenic shock, resolved  - Monitor vital signs.    GI: no acute issues  - Continue regular diet   - Protonix for GERD.    Renal: CKD stage 2, hyponatremia  - Monitor I&Os.  - Monitor electrolytes and replete as necessary.  - IV locked     Heme: no acute issues  - Lovenox for VTE prophylaxis.    ID: MRSA bacteremia, infected right hip s/p I&D,   - Monitor WBC, temperature, and procalcitonin.  - Vancomycin and cefepime for empiric antibiotics.  - Will need MRI to rule out lumbar epidural abscess.    Endo: hypoglycemia (resolved), hyperglycemia, adrenal insufficiency  - Stress dose steroids taper for septic shock as patient is chronically on prednisone for her asthma.  - Monitor fingersticks q4hrs for hypoglycemia.    Misc: infected right hip s/p I&D  - WBAT RLE and foot flat weight bear LLE.    Disposition:  - Full code.  - Will remain in SICU for respiratory and hemodynamic monitoring.    - Scar Worley PA-C

## 2018-08-01 NOTE — PROGRESS NOTE ADULT - ASSESSMENT
68F s/p R hip I&D with exchange of femoral component 7/27 s/ L2 compression fracture, left acetabular fracture    ·	Neuro: Multimodal pain control, does not need spinal precautions  ·	Resp: Stable on Bipap  ·	CV: appreciate critical care monitoring  ·	ID: FU cultures, appreciate ID recs  ·	MSK: WBAT RLE, FFWB LLE, PT/OT  ·	Plan for OR Friday for explant R hip prosthesis with antibiotics spacer, possible girdlestone  ·	DVT prophylaxis with SQH    Ortho [id number]

## 2018-08-01 NOTE — PROGRESS NOTE ADULT - SUBJECTIVE AND OBJECTIVE BOX
[facility] DIVISION OF KIDNEY DISEASES AND HYPERTENSION -- FOLLOW UP NOTE  --------------------------------------------------------------------------------  Ada Altman  [id number]  --------------------------------------------------------------------------------  Chief Complaint: No overnight events.    24 hour events/subjective: Hyponatremia improved Good urine output. On BiPAP      PAST HISTORY  --------------------------------------------------------------------------------  No significant changes to PMH, PSH, FHx, SHx, unless otherwise noted    ALLERGIES & MEDICATIONS  --------------------------------------------------------------------------------  Allergies    ASA; dye contrast (Anaphylaxis)  aspirin (Short breath)  divalproex sodium (Other (Unknown))  Haldol (Other (Unknown))  penicillin (Short breath; Rash)  sulfa drugs (Short breath; Rash)  Xanax (Other (Unknown))    Intolerances      Standing Inpatient Medications  buDESOnide   0.5 milliGRAM(s) Respule 0.5 milliGRAM(s) Inhalation every 12 hours  Ceftaroline Fosamil IVPB 600 milliGRAM(s) IV Intermittent every 12 hours  chlorhexidine 4% Liquid 1 Application(s) Topical <User Schedule>  DAPTOmycin IVPB      DAPTOmycin IVPB 500 milliGRAM(s) IV Intermittent every 24 hours  dexmedetomidine Infusion 0.2 MICROgram(s)/kG/Hr IV Continuous <Continuous>  docusate sodium 100 milliGRAM(s) Oral three times a day  famotidine    Tablet 20 milliGRAM(s) Oral two times a day  FIRST- Mouthwash  BLM 5 milliLiter(s) Swish and Spit daily  heparin  Injectable 5000 Unit(s) SubCutaneous every 8 hours  hydrocortisone sodium succinate Injectable 50 milliGRAM(s) IV Push every 12 hours  hydrocortisone sodium succinate Injectable 25 milliGRAM(s) IV Push every 12 hours  insulin lispro (HumaLOG) corrective regimen sliding scale   SubCutaneous at bedtime  insulin lispro (HumaLOG) corrective regimen sliding scale   SubCutaneous three times a day before meals  lidocaine   Patch 1 Patch Transdermal every 24 hours  melatonin 3 milliGRAM(s) Oral <User Schedule>  mirtazapine 45 milliGRAM(s) Oral <User Schedule>  QUEtiapine 50 milliGRAM(s) Oral <User Schedule>  senna 2 Tablet(s) Oral at bedtime  sertraline 50 milliGRAM(s) Oral daily    PRN Inpatient Medications  acetaminophen   Tablet. 975 milliGRAM(s) Oral every 6 hours PRN  ALBUTerol/ipratropium for Nebulization 3 milliLiter(s) Nebulizer every 6 hours PRN  traMADol 50 milliGRAM(s) Oral every 6 hours PRN      REVIEW OF SYSTEMS  --------------------------------------------------------------------------------  Review Of Systems:  Constitutional:No Fever,  Chills,  Fatigue, Weight change   HEENT: No Blurred vision, Eye Pain, Headache, Runny nose, Sore Throat   Respiratory:No Cough, Wheezing, Shortness of breath  Cardiovascular: No Chest Pain, Palpitations, FARR, PND, Orthopnea  Gastrointestinal:No Nausea, Vomiting, Abdominal Pain,  Diarrhea, Constipation, Hemorrhoids  Genitourinary:No  Nocturia, Hematuria,  Dysuria, Incontinence, Foam in urine  Extremities:  No Swelling , Joint Pain  Neurologic:  No Focal deficit, Paresthesias, Syncope  Lymphatic:   No Lymphadenopathy   Skin: No Rash,  Ecchymoses , Wounds, Lesions  Psychiatry: Denies Depression,  Suicidal/Homicidal Ideation, Anxiety, Sleep Disturbances    All other systems were reviewed and are negative, except as noted.    VITALS/PHYSICAL EXAM  --------------------------------------------------------------------------------  T(C): 36.8 (08-01-18 @ 03:00), Max: 37.1 (07-31-18 @ 11:00)  HR: 84 (08-01-18 @ 06:25) (73 - 123)  BP: 156/70 (08-01-18 @ 05:36) (123/86 - 160/84)  RR: 21 (08-01-18 @ 05:36) (14 - 44)  SpO2: 99% (08-01-18 @ 06:25) (93% - 100%)  Wt(kg): --      Daily     Daily   I&O's Summary    30 Jul 2018 07:01  -  31 Jul 2018 07:00  --------------------------------------------------------  IN: 2496 mL / OUT: 2440 mL / NET: 56 mL    31 Jul 2018 07:01  -  01 Aug 2018 06:49  --------------------------------------------------------  IN: 822.5 mL / OUT: 750 mL / NET: 72.5 mL          07-30-18 @ 07:01  -  07-31-18 @ 07:00  --------------------------------------------------------  IN: 2496 mL / OUT: 2440 mL / NET: 56 mL    07-31-18 @ 07:01  -  08-01-18 @ 06:49  --------------------------------------------------------  IN: 822.5 mL / OUT: 750 mL / NET: 72.5 mL        Physical Exam: Lying in bed breathing with NIV  Gen: NAD  HEENT: anicteric  Pulm: CTA B/L   CVS: RRR,  Normal S1 S2  Abd: soft, nontender, nondistended  Neuro: AAO x3, no asterixis   Back: No dependent edema  :  gavin draining dark urine  LE: Warm, no edema  Skin: Warm, without rashes  Vascular access: none    LABS/STUDIES  --------------------------------------------------------------------------------              8.7    10.9  >-----------<  252      [08-01-18 @ 04:10]              26.2     138  |  107  |  34  ----------------------------<  192      [08-01-18 @ 04:10]  4.1   |  18  |  0.74        Ca     7.6     [08-01-18 @ 04:10]      Mg     2.2     [08-01-18 @ 04:10]      Phos  2.9     [08-01-18 @ 04:10]      PT/INR: PT 12.4 , INR 1.13       [07-31-18 @ 00:46]  PTT: 27.4       [07-31-18 @ 06:53]    CK 37      [07-31-18 @ 00:46]    Creatinine Trend:  SCr 0.74 [08-01 @ 04:10]  SCr 0.79 [07-31 @ 06:48]  SCr 0.76 [07-31 @ 00:46]  SCr 0.77 [07-30 @ 20:10]  SCr 0.72 [07-30 @ 12:02]    Urinalysis - [07-28-18 @ 10:00]      Color Yellow / Appearance Clear / SG 1.024 / pH 6.0      Gluc 150 / Ketone Negative  / Bili Negative / Urobili Negative       Blood Trace / Protein 30 / Leuk Est Negative / Nitrite Negative      RBC 3-5 / WBC 3-5 / Hyaline 2-5 / Gran  / Sq Epi  / Non Sq Epi OCC / Bacteria Few    Urine Creatinine 81      [07-27-18 @ 04:56]  Urine Sodium 23      [07-28-18 @ 10:03]  Urine Potassium 68      [07-28-18 @ 10:03]  Urine Chloride 79      [07-28-18 @ 10:03]  Urine Osmolality 598      [07-29-18 @ 11:29]    HbA1c 5.5      [07-27-18 @ 09:02]  TSH 0.95      [07-27-18 @ 09:02]      PIOTR: titer 1:320, pattern Centromere      [07-31-18 @ 05:49]  dsDNA <12      [07-29-18 @ 14:00]  C3 Complement 82      [07-31-18 @ 05:49]  C4 Complement 29      [07-31-18 @ 05:49]  Free Light Chains: kappa 0.84, lambda 1.18, ratio = 0.71      [07-31 @ 05:49]  Immunofixation Serum:   No Monoclonal Band Identified      [07-31-18 @ 05:49]      Radiology  --------------------------------------------------------------------------------    --------------------------------------------------------------------------------  Ada Altman  [id number]

## 2018-08-01 NOTE — PROGRESS NOTE ADULT - PROBLEM SELECTOR PLAN 2
May add K to fluids to avoid hypokalemia.   Check Mg.   Keep K>4.  Could be related to non gap acidosis

## 2018-08-01 NOTE — PROGRESS NOTE ADULT - ASSESSMENT
68F pmhx of asthma, Alzheimer's anxiety/depression, HLD, UC, HTN, S/P right hip fracture s/p hip hemiarthroplasty after a fracture 1month ago, presented with weakness, worsening mentation, found to have sepsis, hyponatremia. Found to have MRSA bacteremia. S/P R Irrigation and debridement of hip & exchange of femoral head component 07/27/2018. On dapto and ceftaroline as per ID. Hyponatremia resolved,no further interventions from nephrology at this point.

## 2018-08-01 NOTE — PROGRESS NOTE ADULT - ATTENDING COMMENTS
I saw and evaluated patient.  I agree with above note.    patient monitored on non invasive positive pressure ventilator for post surgical respiratory failure  on Precedex to control aggravated delirium with good effect  continued to monitor until about noon then changed to nasal cannula and facilitated mobilization  plan to alternate between nasal cannula supplementation and non invasive positive pressure ventilation  antibiotics per infectious disease  encourage P.O. nutrition as tolerated  reviewed with orthopedic surgery attending  reviewed with patients   40 minutes of critical care management applied

## 2018-08-01 NOTE — PROGRESS NOTE ADULT - SUBJECTIVE AND OBJECTIVE BOX
HISTORY  68y Female past medical history of asthma on steroids, CVA (no residual deficits), pulmonary HTN, HLD, GERD, ulcerative colitis, fatty pancreas, impaired glucose tolerance, anxiety, and depression who presented on 7/26/2018 with altered mental status, rigors, and urinary frequency. Of note, patient was recently hospitalized for a right femoral neck fracture secondary to osteoporosis s/p hemiarthroplasty on 6/22/2018. She had been taking Plavix for her history of CVA and Lovenox for VTE prophylaxis but she developed a hematoma at her surgical site so the Lovenox was stopped. Since then, patient has been progressively more agitated, confused, and weak. Additionally, patient has been complaining of new onset low back pain and left hip pain. In the ED, patient was hemodynamically stable but noted to be hypoglycemic to the 20s so she received 2 amps of D50. She was also given 2 L of crystalloid and started on meropenem & vancomycin for concern of sepsis. CT scan revealed a 17 cm collection adjacent to the right hip prosthesis, an acute L2 compression fracture, and new left acetabular fracture. Patient was admitted to medicine but on 7/27/2018, patient was noted to be more altered, rigorous, tachypneic, febrile to 101 F, and hypotensive w/ SBP in the 60s so an RRT was called. She was given 1 L of crystalloid and started on a norepinephrine gtt. Patient subsequently admitted to SICU for hemodynamic monitoring. An emergent right axillary arterial line and left IJ central venous catheter was placed.      24 HOUR EVENTS: melatonin added in addition to remeron and seroquil at night to promote sleep. patient had an acute episode of tachypnea for which she was started empericially on heparin gtt for a brief period of time out of conern for a PE. tacypnea self resolved and heaprin discontinued. Patient did not receive CTPA. Was placed on BiPAP for an additioanl episode of labored breathing for which she responded well to. Breathing appeared to be much less labored and gas exchange adequate on ABG. Precedex started for agitation in the evening with an additional push of 0.25mg. of ativan given for agitation.     SUBJECTIVE/ROS:  [ ] A ten-point review of systems was otherwise negative except as noted.  [ ] Due to altered mental status/intubation, subjective information were not able to be obtained from the patient. History was obtained, to the extent possible, from review of the chart and collateral sources of information.      NEURO  RASS:  -2   Meds: acetaminophen   Tablet. 975 milliGRAM(s) Oral every 6 hours PRN Mild Pain (1 - 3)  dexmedetomidine Infusion 0.2 MICROgram(s)/kG/Hr IV Continuous <Continuous>  melatonin 3 milliGRAM(s) Oral <User Schedule>  mirtazapine 45 milliGRAM(s) Oral <User Schedule>  QUEtiapine 50 milliGRAM(s) Oral <User Schedule>  sertraline 50 milliGRAM(s) Oral daily  traMADol 50 milliGRAM(s) Oral every 6 hours PRN Severe Pain (7 - 10)    [x] Adequacy of sedation and pain control has been assessed and adjusted      RESPIRATORY  RR: 14 (08-01-18 @ 00:00) (14 - 44)  SpO2: 100% (08-01-18 @ 00:14) (94% - 100%)  Exam: unlabored, clear to auscultation bilaterally  Mechanical Ventilation: none  ABG - ( 31 Jul 2018 21:15 )  pH: 7.49  /  pCO2: 22    /  pO2: 196   / HCO3: 16    / Base Excess: -5.8  /  SaO2: 100     Lactate: x      [N/A] Extubation Readiness Assessed  Meds: ALBUTerol/ipratropium for Nebulization 3 milliLiter(s) Nebulizer every 6 hours PRN Shortness of Breath and/or Wheezing  buDESOnide   0.5 milliGRAM(s) Respule 0.5 milliGRAM(s) Inhalation every 12 hours        CARDIOVASCULAR  HR: 75 (08-01-18 @ 00:14) (75 - 123)  BP: 131/61 (08-01-18 @ 00:00) (123/86 - 160/84)  BP(mean): 88 (08-01-18 @ 00:00) (88 - 114)  ABP: 128/63 (07-31-18 @ 17:00) (114/55 - 162/87)  ABP(mean): 92 (07-31-18 @ 17:00) (79 - 122)  VBG - ( 30 Jul 2018 03:22 )  pH: 7.34  /  pCO2: 32    /  pO2: 35    / HCO3: 17    / Base Excess: -7.3  /  SaO2: 62     Lactate: 1.2    Exam: regular rate and rhythm  Cardiac Rhythm: sinus  Perfusion     [x]Adequate   [ ]Inadequate  Mentation   [x]Normal       [ ]Reduced  Extremities  [x]Warm         [ ]Cool  Volume Status [ ]Hypervolemic [x]Euvolemic [ ]Hypovolemic  Meds: none      GI/NUTRITION  Exam: soft, nontender, nondistended, incision C/D/I  Diet: regular diet   Meds: docusate sodium 100 milliGRAM(s) Oral three times a day  famotidine    Tablet 20 milliGRAM(s) Oral two times a day  senna 2 Tablet(s) Oral at bedtime      GENITOURINARY  I&O's Detail    07-30 @ 07:01  -  07-31 @ 07:00  --------------------------------------------------------  IN:    dexmedetomidine Infusion: 100.9 mL    dexmedetomidine Infusion: 45 mL    dextrose 5%: 525 mL    dextrose 5% + sodium chloride 0.45%: 200 mL    heparin Infusion: 63 mL    IV PiggyBack: 300 mL    multiple electrolytes Injection Type 1multiple electrolytes Injection Type 1: 500 mL    propofol Infusion: 12.1 mL    sodium chloride 0.9%: 300 mL    Solution: 200 mL    Solution: 250 mL  Total IN: 2496 mL    OUT:    Accordian: 10 mL    Accordian: 240 mL    Indwelling Catheter - Urethral: 2190 mL  Total OUT: 2440 mL    Total NET: 56 mL      07-31 @ 07:01  -  08-01 @ 01:07  --------------------------------------------------------  IN:    dexmedetomidine Infusion: 93.1 mL    IV PiggyBack: 100 mL    multiple electrolytes Injection Type 1multiple electrolytes Injection Type 1: 100 mL    Oral Fluid: 100 mL    Packed Red Blood Cells: 300 mL  Total IN: 693.1 mL    OUT:    Accordian: 20 mL    Accordian: 120 mL    Indwelling Catheter - Urethral: 500 mL  Total OUT: 640 mL    Total NET: 53.1 mL          07-31    138  |  105  |  36<H>  ----------------------------<  248<H>  3.7   |  17<L>  |  0.79    Ca    7.6<L>      31 Jul 2018 06:48  Phos  3.3     07-31  Mg     2.4     07-31      [x] Blackman catheter, indication: urine output monitoring in critically ill   Meds: none      HEMATOLOGIC  Meds: heparin  Injectable 5000 Unit(s) SubCutaneous every 8 hours    [x] VTE Prophylaxis                        8.7    13.3  )-----------( 244      ( 31 Jul 2018 14:47 )             26.0     PT/INR - ( 31 Jul 2018 00:46 )   PT: 12.4 sec;   INR: 1.13 ratio         PTT - ( 31 Jul 2018 06:53 )  PTT:27.4 sec  Transfusion     [ ] PRBC   [ ] Platelets   [ ] FFP   [ ] Cryoprecipitate      INFECTIOUS DISEASES  WBC Count: 13.3 K/uL (07-31 @ 14:47)  WBC Count: 7.4 K/uL (07-31 @ 06:56)    RECENT CULTURES:  Specimen Source: .Blood Blood-Venous  Date/Time: 07-30 @ 05:44  Culture Results:   Growth in aerobic bottle: Gram Positive Cocci in Clusters  Gram Stain:   Growth in aerobic bottle: Gram Positive Cocci in Clusters  Organism: --  Specimen Source: .Blood Blood-Venous  Date/Time: 07-29 @ 08:29  Culture Results:   Growth in aerobic and anaerobic bottles: Methicillin resistant  Staphylococcus aureus  See previous culture [id number]  Gram Stain:   Growth in aerobic bottle: Gram Positive Cocci in Clusters  Growth in anaerobic bottle: Gram Positive Cocci in Clusters  Organism: --  Specimen Source: .Blood Blood-Venous  Date/Time: 07-29 @ 06:52  Culture Results:   Growth in aerobic and anaerobic bottles: Methicillin resistant  Staphylococcus aureus  See previous culture [id number]  Gram Stain:   Growth in anaerobic bottle: Gram Positive Cocci in Clusters  Growth in aerobic bottle:  Gram Positive Cocci in Clusters  Organism: --  Specimen Source: .Blood Blood  Date/Time: 07-28 @ 05:27  Culture Results:   Growth in aerobic and anaerobic bottles: Methicillin resistant  Staphylococcus aureus  See previous culture 10-CB-  Gram Stain:   Growth in aerobic bottle: Gram Positive Cocci in Clusters  Growth in anaerobic bottle: Gram Positive Cocci in Clusters  Organism: --  Specimen Source: .Tissue Other, right hip fluid superficial  Date/Time: 07-27 @ 22:56  Culture Results:   Testing in progress  Gram Stain: --  Organism: --  Specimen Source: .Tissue 1 right hip fluid superficial  Date/Time: 07-27 @ 22:54  Culture Results:   Moderate Methicillin resistant Staphylococcus aureus  Gram Stain:   No polymorphonuclear cells seen per low power field  Rare Gram variable coccobacilli per oil power field  Organism: Methicillin resistant Staphylococcus aureus  Specimen Source: .Other Other, right hip fluid deep  Date/Time: 07-27 @ 22:34  Culture Results:   Testing in progress  Gram Stain: --  Organism: Methicillin resistant Staphylococcus aureus  Specimen Source: .Tissue Other, 2 right hip tissue  Date/Time: 07-27 @ 21:26  Culture Results:   Testing in progress  Gram Stain:   No polymorphonuclear cells seen per low power field  No organisms seen per oil power field  Organism: Methicillin resistant Staphylococcus aureus  Specimen Source: .Tissue Other, right hip soft tissue  Date/Time: 07-27 @ 21:22  Culture Results:   Few Methicillin resistant Staphylococcus aureus  Gram Stain:   No polymorphonuclear cells seen per low power field  No organisms seen per oil power field  Organism: Methicillin resistant Staphylococcus aureus  Specimen Source: .Blood Blood  Date/Time: 07-27 @ 08:24  Culture Results:   Growth in aerobic and anaerobic bottles: Methicillin resistant  Staphylococcus aureus  See previous culture [id number]  Gram Stain:   Growth in anaerobic bottle: Gram Positive Cocci in Clusters  Growth in aerobic bottle: Gram Positive Cocci in Clusters  Organism: --    Meds: Ceftaroline Fosamil IVPB 600 milliGRAM(s) IV Intermittent every 12 hours  DAPTOmycin IVPB      DAPTOmycin IVPB 500 milliGRAM(s) IV Intermittent every 24 hours        ENDOCRINE  CAPILLARY BLOOD GLUCOSE      POCT Blood Glucose.: 149 mg/dL (31 Jul 2018 22:52)  POCT Blood Glucose.: 138 mg/dL (31 Jul 2018 17:37)  POCT Blood Glucose.: 209 mg/dL (31 Jul 2018 11:07)    Meds: hydrocortisone sodium succinate Injectable 50 milliGRAM(s) IV Push every 12 hours  hydrocortisone sodium succinate Injectable 25 milliGRAM(s) IV Push every 12 hours  insulin lispro (HumaLOG) corrective regimen sliding scale   SubCutaneous at bedtime  insulin lispro (HumaLOG) corrective regimen sliding scale   SubCutaneous three times a day before meals        ACCESS DEVICES:  [x] Peripheral IV  [ ] Central Venous Line	[ ] R	[ ] L	[ ] IJ	[ ] Fem	[ ] SC	Placed:   [ ] Arterial Line		[ ] R	[ ] L	[ ] Fem	[ ] Rad	[ ] Ax	Placed:   [ ] PICC:					[ ] Mediport  [ ] Urinary Catheter, Date Placed:   [x] Necessity of urinary, arterial, and venous catheters discussed    OTHER MEDICATIONS:  chlorhexidine 4% Liquid 1 Application(s) Topical <User Schedule>  FIRST- Mouthwash  BLM 5 milliLiter(s) Swish and Spit daily  lidocaine   Patch 1 Patch Transdermal every 24 hours      CODE STATUS: full code       IMAGING: < from: CT Abdomen and Pelvis No Cont (07.26.18 @ 17:19) >  IMPRESSION:     A 17.0 cm collection along the right lateral thigh musculature adjacent   to the right hip prosthesis. Superimposed infection is not excluded.     New compression fracture of the L2 vertebral body as above. Correlate   with pending lumbar spine MRI.    Mildly depressed acute fracture of the sternal manubrium.    Acute comminuted fracture of the left acetabulum.    < end of copied text > HISTORY  68y Female past medical history of asthma on steroids, CVA (no residual deficits), pulmonary HTN, HLD, GERD, ulcerative colitis, fatty pancreas, impaired glucose tolerance, anxiety, and depression who presented on 7/26/2018 with altered mental status, rigors, and urinary frequency. Of note, patient was recently hospitalized for a right femoral neck fracture secondary to osteoporosis s/p hemiarthroplasty on 6/22/2018. She had been taking Plavix for her history of CVA and Lovenox for VTE prophylaxis but she developed a hematoma at her surgical site so the Lovenox was stopped. Since then, patient has been progressively more agitated, confused, and weak. Additionally, patient has been complaining of new onset low back pain and left hip pain. In the ED, patient was hemodynamically stable but noted to be hypoglycemic to the 20s so she received 2 amps of D50. She was also given 2 L of crystalloid and started on meropenem & vancomycin for concern of sepsis. CT scan revealed a 17 cm collection adjacent to the right hip prosthesis, an acute L2 compression fracture, and new left acetabular fracture. Patient was admitted to medicine but on 7/27/2018, patient was noted to be more altered, rigorous, tachypneic, febrile to 101 F, and hypotensive w/ SBP in the 60s so an RRT was called. She was given 1 L of crystalloid and started on a norepinephrine gtt. Patient subsequently admitted to SICU for hemodynamic monitoring. An emergent right axillary arterial line and left IJ central venous catheter was placed.      24 HOUR EVENTS: melatonin added in addition to remeron and seroquil at night to promote sleep. patient had an acute episode of tachypnea for which she was started empericially on heparin gtt for a brief period of time out of [place] for a PE. tacypnea self resolved and heaprin discontinued. Patient did not receive CTPA. Was placed on BiPAP for an additioanl episode of labored breathing for which she responded well to. Breathing appeared to be much less labored and gas exchange adequate on ABG. Precedex started for agitation in the evening with an additional push of 0.25mg. of ativan given for agitation.     SUBJECTIVE/ROS:  [ ] A ten-point review of systems was otherwise negative except as noted.  [ ] Due to altered mental status/intubation, subjective information were not able to be obtained from the patient. History was obtained, to the extent possible, from review of the chart and collateral sources of information.      NEURO  RASS:  -2   Meds: acetaminophen   Tablet. 975 milliGRAM(s) Oral every 6 hours PRN Mild Pain (1 - 3)  dexmedetomidine Infusion 0.2 MICROgram(s)/kG/Hr IV Continuous <Continuous>  melatonin 3 milliGRAM(s) Oral <User Schedule>  mirtazapine 45 milliGRAM(s) Oral <User Schedule>  QUEtiapine 50 milliGRAM(s) Oral <User Schedule>  sertraline 50 milliGRAM(s) Oral daily  traMADol 50 milliGRAM(s) Oral every 6 hours PRN Severe Pain (7 - 10)    [x] Adequacy of sedation and pain control has been assessed and adjusted      RESPIRATORY  RR: 14 (08-01-18 @ 00:00) (14 - 44)  SpO2: 100% (08-01-18 @ 00:14) (94% - 100%)  Exam: unlabored, clear to auscultation bilaterally  Mechanical Ventilation: none  ABG - ( 31 Jul 2018 21:15 )  pH: 7.49  /  pCO2: 22    /  pO2: 196   / HCO3: 16    / Base Excess: -5.8  /  SaO2: 100     Lactate: x      [N/A] Extubation Readiness Assessed  Meds: ALBUTerol/ipratropium for Nebulization 3 milliLiter(s) Nebulizer every 6 hours PRN Shortness of Breath and/or Wheezing  buDESOnide   0.5 milliGRAM(s) Respule 0.5 milliGRAM(s) Inhalation every 12 hours        CARDIOVASCULAR  HR: 75 (08-01-18 @ 00:14) (75 - 123)  BP: 131/61 (08-01-18 @ 00:00) (123/86 - 160/84)  BP(mean): 88 (08-01-18 @ 00:00) (88 - 114)  ABP: 128/63 (07-31-18 @ 17:00) (114/55 - 162/87)  ABP(mean): 92 (07-31-18 @ 17:00) (79 - 122)  VBG - ( 30 Jul 2018 03:22 )  pH: 7.34  /  pCO2: 32    /  pO2: 35    / HCO3: 17    / Base Excess: -7.3  /  SaO2: 62     Lactate: 1.2    Exam: regular rate and rhythm  Cardiac Rhythm: sinus  Perfusion     [x]Adequate   [ ]Inadequate  Mentation   [x]Normal       [ ]Reduced  Extremities  [x]Warm         [ ]Cool  Volume Status [ ]Hypervolemic [x]Euvolemic [ ]Hypovolemic  Meds: none      GI/NUTRITION  Exam: soft, nontender, nondistended, incision C/D/I  Diet: regular diet   Meds: docusate sodium 100 milliGRAM(s) Oral three times a day  famotidine    Tablet 20 milliGRAM(s) Oral two times a day  senna 2 Tablet(s) Oral at bedtime      GENITOURINARY  I&O's Detail    07-30 @ 07:01  -  07-31 @ 07:00  --------------------------------------------------------  IN:    dexmedetomidine Infusion: 100.9 mL    dexmedetomidine Infusion: 45 mL    dextrose 5%: 525 mL    dextrose 5% + sodium chloride 0.45%: 200 mL    heparin Infusion: 63 mL    IV PiggyBack: 300 mL    multiple electrolytes Injection Type 1multiple electrolytes Injection Type 1: 500 mL    propofol Infusion: 12.1 mL    sodium chloride 0.9%: 300 mL    Solution: 200 mL    Solution: 250 mL  Total IN: 2496 mL    OUT:    Accordian: 10 mL    Accordian: 240 mL    Indwelling Catheter - Urethral: 2190 mL  Total OUT: 2440 mL    Total NET: 56 mL      07-31 @ 07:01  -  08-01 @ 01:07  --------------------------------------------------------  IN:    dexmedetomidine Infusion: 93.1 mL    IV PiggyBack: 100 mL    multiple electrolytes Injection Type 1multiple electrolytes Injection Type 1: 100 mL    Oral Fluid: 100 mL    Packed Red Blood Cells: 300 mL  Total IN: 693.1 mL    OUT:    Accordian: 20 mL    Accordian: 120 mL    Indwelling Catheter - Urethral: 500 mL  Total OUT: 640 mL    Total NET: 53.1 mL                      8.7                  x    | x    | x            13.3  >-----------< 244     ------------------------< x                     26.0                 x    | x    | x                                            Ca x     Mg x     Ph x        [x] Blackman catheter, indication: urine output monitoring in critically ill   Meds: none      HEMATOLOGIC  Meds: heparin  Injectable 5000 Unit(s) SubCutaneous every 8 hours    [x] VTE Prophylaxis      Transfusion     [ ] PRBC   [ ] Platelets   [ ] FFP   [ ] Cryoprecipitate      INFECTIOUS DISEASES  WBC Count: 13.3 K/uL (07-31 @ 14:47)  WBC Count: 7.4 K/uL (07-31 @ 06:56)    RECENT CULTURES:  Specimen Source: .Blood Blood-Venous  Date/Time: 07-30 @ 05:44  Culture Results:   Growth in aerobic bottle: Gram Positive Cocci in Clusters  Gram Stain:   Growth in aerobic bottle: Gram Positive Cocci in Clusters  Organism: --  Specimen Source: .Blood Blood-Venous  Date/Time: 07-29 @ 08:29  Culture Results:   Growth in aerobic and anaerobic bottles: Methicillin resistant  Staphylococcus aureus  See previous culture [id number]  Gram Stain:   Growth in aerobic bottle: Gram Positive Cocci in Clusters  Growth in anaerobic bottle: Gram Positive Cocci in Clusters  Organism: --  Specimen Source: .Blood Blood-Venous  Date/Time: 07-29 @ 06:52  Culture Results:   Growth in aerobic and anaerobic bottles: Methicillin resistant  Staphylococcus aureus  See previous culture [id number]  Gram Stain:   Growth in anaerobic bottle: Gram Positive Cocci in Clusters  Growth in aerobic bottle:  Gram Positive Cocci in Clusters  Organism: --  Specimen Source: .Blood Blood  Date/Time: 07-28 @ 05:27  Culture Results:   Growth in aerobic and anaerobic bottles: Methicillin resistant  Staphylococcus aureus  See previous culture 10-[id number]-  Gram Stain:   Growth in aerobic bottle: Gram Positive Cocci in Clusters  Growth in anaerobic bottle: Gram Positive Cocci in Clusters  Organism: --  Specimen Source: .Tissue Other, right hip fluid superficial  Date/Time: 07-27 @ 22:56  Culture Results:   Testing in progress  Gram Stain: --  Organism: --  Specimen Source: .Tissue 1 right hip fluid superficial  Date/Time: 07-27 @ 22:54  Culture Results:   Moderate Methicillin resistant Staphylococcus aureus  Gram Stain:   No polymorphonuclear cells seen per low power field  Rare Gram variable coccobacilli per oil power field  Organism: Methicillin resistant Staphylococcus aureus  Specimen Source: .Other Other, right hip fluid deep  Date/Time: 07-27 @ 22:34  Culture Results:   Testing in progress  Gram Stain: --  Organism: Methicillin resistant Staphylococcus aureus  Specimen Source: .Tissue Other, 2 right hip tissue  Date/Time: 07-27 @ 21:26  Culture Results:   Testing in progress  Gram Stain:   No polymorphonuclear cells seen per low power field  No organisms seen per oil power field  Organism: Methicillin resistant Staphylococcus aureus  Specimen Source: .Tissue Other, right hip soft tissue  Date/Time: 07-27 @ 21:22  Culture Results:   Few Methicillin resistant Staphylococcus aureus  Gram Stain:   No polymorphonuclear cells seen per low power field  No organisms seen per oil power field  Organism: Methicillin resistant Staphylococcus aureus  Specimen Source: .Blood Blood  Date/Time: 07-27 @ 08:24  Culture Results:   Growth in aerobic and anaerobic bottles: Methicillin resistant  Staphylococcus aureus  See previous culture [id number]  Gram Stain:   Growth in anaerobic bottle: Gram Positive Cocci in Clusters  Growth in aerobic bottle: Gram Positive Cocci in Clusters  Organism: --    Meds: Ceftaroline Fosamil IVPB 600 milliGRAM(s) IV Intermittent every 12 hours  DAPTOmycin IVPB      DAPTOmycin IVPB 500 milliGRAM(s) IV Intermittent every 24 hours        ENDOCRINE  CAPILLARY BLOOD GLUCOSE      POCT Blood Glucose.: 149 mg/dL (31 Jul 2018 22:52)  POCT Blood Glucose.: 138 mg/dL (31 Jul 2018 17:37)  POCT Blood Glucose.: 209 mg/dL (31 Jul 2018 11:07)    Meds: hydrocortisone sodium succinate Injectable 50 milliGRAM(s) IV Push every 12 hours  hydrocortisone sodium succinate Injectable 25 milliGRAM(s) IV Push every 12 hours  insulin lispro (HumaLOG) corrective regimen sliding scale   SubCutaneous at bedtime  insulin lispro (HumaLOG) corrective regimen sliding scale   SubCutaneous three times a day before meals        ACCESS DEVICES:  [x] Peripheral IV  [ ] Central Venous Line	[ ] R	[ ] L	[ ] IJ	[ ] Fem	[ ] SC	Placed:   [ ] Arterial Line		[ ] R	[ ] L	[ ] Fem	[ ] Rad	[ ] Ax	Placed:   [ ] PICC:					[ ] Mediport  [ ] Urinary Catheter, Date Placed:   [x] Necessity of urinary, arterial, and venous catheters discussed    OTHER MEDICATIONS:  chlorhexidine 4% Liquid 1 Application(s) Topical <User Schedule>  FIRST- Mouthwash  BLM 5 milliLiter(s) Swish and Spit daily  lidocaine   Patch 1 Patch Transdermal every 24 hours      CODE STATUS: full code       IMAGING: < from: CT Abdomen and Pelvis No Cont (07.26.18 @ 17:19) >  IMPRESSION:     A 17.0 cm collection along the right lateral thigh musculature adjacent   to the right hip prosthesis. Superimposed infection is not excluded.     New compression fracture of the L2 vertebral body as above. Correlate   with pending lumbar spine MRI.    Mildly depressed acute fracture of the sternal manubrium.    Acute comminuted fracture of the left acetabulum.    < end of copied text >

## 2018-08-01 NOTE — PROGRESS NOTE ADULT - SUBJECTIVE AND OBJECTIVE BOX
CC: f/u for high grade sustained MRSA bacteremia    Pt remains sedated on Vent, events of last night noted  ICU notes reviewed, ?PE, +troponins    REVIEW OF SYSTEMS:  Not proviede on Vent    Antimicrobials Day # 7  Ceftaroline Fosamil IVPB 600 milliGRAM(s) IV Intermittent every 12 hours  DAPTOmycin IVPB      DAPTOmycin IVPB 500 milliGRAM(s) IV Intermittent every 24 hours      Medications Reviewed    ICU Vital Signs Last 24 Hrs  T(C): 36.7 (01 Aug 2018 11:00), Max: 36.9 (31 Jul 2018 20:00)  T(F): 98.1 (01 Aug 2018 11:00), Max: 98.5 (31 Jul 2018 20:00)  HR: 75 (01 Aug 2018 13:00) (59 - 116)  BP: 152/71 (01 Aug 2018 13:00) (123/86 - 160/84)  BP(mean): 102 (01 Aug 2018 13:00) (86 - 114)  ABP: 128/63 (31 Jul 2018 17:00) (123/60 - 142/73)  ABP(mean): 92 (31 Jul 2018 17:00) (87 - 103)  RR: 20 (01 Aug 2018 13:00) (12 - 56)  SpO2: 100% (01 Aug 2018 13:00) (93% - 100%)        PHYSICAL EXAM:  General: no acute distress, now extubated, restless  on high flow NC  Eyes:  anicteric, no conjunctival injection, no discharge  Oropharynx: clear, no exudate  Neck: LIJ CVL  Lungs: clear to auscultation  Heart: regular rate and rhythm; no murmur, rubs or gallops  Abdomen: soft, nondistended, nontender, without mass or organomegaly  Blackman  Skin: R hip incision dressing intact; hemovac x 2  Extremities: no edema  Neurologic: awake, but restless, follows simple commands    LAB RESULTS:                                              8.7    10.9  )-----------( 252      ( 01 Aug 2018 04:10 )             26.2   08-01    138  |  107  |  34<H>  ----------------------------<  192<H>  4.1   |  18<L>  |  0.74    Ca    7.6<L>      01 Aug 2018 04:10  Phos  2.9     08-01  Mg     2.2     08-01            MICROBIOLOGY:  RECENT CULTURES REVIEWED        RADIOLOGY REVIEWED

## 2018-08-01 NOTE — PROGRESS NOTE ADULT - ASSESSMENT
67 yo female with dementia, history of UC, and s/p RT Hip hemiarthroplasty 6/22/18  postsurgical hematoma  admitted with low grade fever, rigors, increased confusion, found to have leukocytosis, 20% bands, ICU, pressor use   Bld Cxs 7/26-7/31 all MRSA  s/p R hip I&D, lavage, poly exchange- pus encountered, all specimens S aureus  Remains on Vent,  Afebrile, Creat stable  Now on Dapto and ceftaroline, but cultures remain positive    Plan:  cont combo Tx with Dapto and Ceftaroline for now  downgrade to Dapto/Rifampin might be an option once bacteremia controlled  will need eventual PICC line once bacteremia is controlled for long term antibiotics  f/u repeat blood cult  awaiting GAYATHRI  source control remains most important issue, reviewed with family at bedside  Ortho input appreciated, awaiting OR on Friday for removal of hardware: spacer vs griddle stone   d/w ICU RN

## 2018-08-01 NOTE — CONSULT NOTE ADULT - SUBJECTIVE AND OBJECTIVE BOX
pt is a 67 y/o female seen for b/l heel lesions pt in Zfloat boots  pedal pulses weakly palpable, TG WNL CFT 3 sec x10  epicritic sensation intact  right heel superficial hemorraghic resolved bullae  stable and healing well left heel minimal erythema noted and hemorraghic lesion noted stable no signs of infection  recommend Cavilon  to heels daily  continue Zfloat boots  thank you for consult  please reconsult PRN

## 2018-08-01 NOTE — ED PROVIDER NOTE - DATA REVIEWED, MDM
old records/vital signs/nurses notes Purse String (Simple) Text: Given the location of the defect and the characteristics of the surrounding skin a purse string closure was deemed most appropriate.  Undermining was performed circumfirentially around the surgical defect.  A purse string suture was then placed and tightened.

## 2018-08-01 NOTE — PROVIDER CONTACT NOTE (OTHER) - ASSESSMENT
Alert and oriented to self, confused, agitated on Bipap with laboured breathing, sinus tachy 110-120,

## 2018-08-01 NOTE — PROGRESS NOTE ADULT - SUBJECTIVE AND OBJECTIVE BOX
---___---___---___---___---___---___ ---___---___---___---___---___---___---___---___---___---                    <<<  M E D I C A L   A T T E N D I N G    F O L L O W    U P   N O T E  >>>    on bipap now sedated awaiting procedure 8/3/18     ---___---___---___---___---___---      <<<  MEDICATIONS:  >>>    MEDICATIONS  (STANDING):  buDESOnide   0.5 milliGRAM(s) Respule 0.5 milliGRAM(s) Inhalation every 12 hours  Ceftaroline Fosamil IVPB 600 milliGRAM(s) IV Intermittent every 12 hours  chlorhexidine 4% Liquid 1 Application(s) Topical <User Schedule>  DAPTOmycin IVPB      DAPTOmycin IVPB 500 milliGRAM(s) IV Intermittent every 24 hours  dexmedetomidine Infusion 0.2 MICROgram(s)/kG/Hr (2.52 mL/Hr) IV Continuous <Continuous>  docusate sodium 100 milliGRAM(s) Oral three times a day  famotidine    Tablet 20 milliGRAM(s) Oral two times a day  FIRST- Mouthwash  BLM 5 milliLiter(s) Swish and Spit daily  heparin  Injectable 5000 Unit(s) SubCutaneous every 8 hours  hydrocortisone sodium succinate Injectable 50 milliGRAM(s) IV Push every 12 hours  hydrocortisone sodium succinate Injectable 25 milliGRAM(s) IV Push every 12 hours  insulin lispro (HumaLOG) corrective regimen sliding scale   SubCutaneous at bedtime  insulin lispro (HumaLOG) corrective regimen sliding scale   SubCutaneous three times a day before meals  lidocaine   Patch 1 Patch Transdermal every 24 hours  melatonin 3 milliGRAM(s) Oral <User Schedule>  mirtazapine 45 milliGRAM(s) Oral <User Schedule>  QUEtiapine 50 milliGRAM(s) Oral <User Schedule>  senna 2 Tablet(s) Oral at bedtime  sertraline 50 milliGRAM(s) Oral daily      MEDICATIONS  (PRN):  acetaminophen   Tablet. 975 milliGRAM(s) Oral every 6 hours PRN Mild Pain (1 - 3)  ALBUTerol/ipratropium for Nebulization 3 milliLiter(s) Nebulizer every 6 hours PRN Shortness of Breath and/or Wheezing  traMADol 50 milliGRAM(s) Oral every 6 hours PRN Severe Pain (7 - 10)       ---___---___---___---___---___---     <<<REVIEW OF SYSTEM: >>>  unable to obtain (sedated )    ---___---___---___---___---___---          <<<  VITAL SIGNS: >>>    T(F): 98.3 (08-01-18 @ 03:00), Max: 98.8 (07-31-18 @ 11:00)  HR: 84 (08-01-18 @ 06:25) (73 - 123)  BP: 156/70 (08-01-18 @ 05:36) (123/86 - 160/84)  RR: 21 (08-01-18 @ 05:36) (14 - 44)  SpO2: 99% (08-01-18 @ 06:25) (93% - 100%)  Wt(kg): --  CAPILLARY BLOOD GLUCOSE      POCT Blood Glucose.: 154 mg/dL (01 Aug 2018 06:55)    I&O's Summary    31 Jul 2018 07:01  -  01 Aug 2018 07:00  --------------------------------------------------------  IN: 822.5 mL / OUT: 750 mL / NET: 72.5 mL         ---___---___---___---___---___---                       PHYSICAL EXAM:    GEN: laying in bed on bipap   HEENT: NCAT, PERRL, MMM, no scleral icterus, hearing intact  NECK: Supple, No JVD  CVS: S1S2 , regular , No M/R/G appreciated  PULM: CTA B/L,  no W/R/R appreciated  ABD.: soft. non tender, non distended,  bowel sounds present  Extrem: intact pulses , no edema noted  Derm: No rash or ecchymosis noted        <<<  LAB AND IMAGING: >>>                          8.7    10.9  )-----------( 252      ( 01 Aug 2018 04:10 )             26.2               08-01    138  |  107  |  34<H>  ----------------------------<  192<H>  4.1   |  18<L>  |  0.74    Ca    7.6<L>      01 Aug 2018 04:10  Phos  2.9     08-01  Mg     2.2     08-01      PT/INR - ( 31 Jul 2018 00:46 )   PT: 12.4 sec;   INR: 1.13 ratio         PTT - ( 31 Jul 2018 06:53 )  PTT:27.4 sec         CARDIAC MARKERS ( 31 Jul 2018 00:46 )  x     / x     / 37 U/L / x     / 3.9 ng/mL            ABG - ( 01 Aug 2018 05:05 )  pH, Arterial: 7.42  pH, Blood: x     /  pCO2: 29    /  pO2: 74    / HCO3: 19    / Base Excess: -4.6  /  SaO2: 95                      [All pertinent / recent available Imaging reports and other labs reviewed]     ---___---___---___---___---___---___ ---___---___---___---___---           <<<  A S S E S S M E N T   A N D   P L A N :  >>>          -GI/DVT Prophylaxis.    --------------------------------------------  Case discussed with   mr roque   Education given on     >>______________________<<      Deniz Bolden .         phone   [phone]

## 2018-08-01 NOTE — CHART NOTE - NSCHARTNOTEFT_GEN_A_CORE
Measure and deliver [place] custom fit LSO. Left bedside to be worn when out of bed. Reviewed application skin precautions and care with spouse. Written instructions and contact information given. To notify office with any issues questions or conerns.  Kulwant BOB  [facility]  [phone]

## 2018-08-02 ENCOUNTER — TRANSCRIPTION ENCOUNTER (OUTPATIENT)
Age: 68
End: 2018-08-02

## 2018-08-02 LAB
-  AMPICILLIN/SULBACTAM: SIGNIFICANT CHANGE UP
-  CEFAZOLIN: SIGNIFICANT CHANGE UP
-  CLINDAMYCIN: SIGNIFICANT CHANGE UP
-  DAPTOMYCIN: SIGNIFICANT CHANGE UP
-  ERYTHROMYCIN: SIGNIFICANT CHANGE UP
-  GENTAMICIN: SIGNIFICANT CHANGE UP
-  LINEZOLID: SIGNIFICANT CHANGE UP
-  OXACILLIN: SIGNIFICANT CHANGE UP
-  PENICILLIN: SIGNIFICANT CHANGE UP
-  RIFAMPIN: SIGNIFICANT CHANGE UP
-  TETRACYCLINE: SIGNIFICANT CHANGE UP
-  TRIMETHOPRIM/SULFAMETHOXAZOLE: SIGNIFICANT CHANGE UP
-  VANCOMYCIN: SIGNIFICANT CHANGE UP
ANION GAP SERPL CALC-SCNC: 14 MMOL/L — SIGNIFICANT CHANGE UP (ref 5–17)
APTT BLD: 27.7 SEC — SIGNIFICANT CHANGE UP (ref 27.5–37.4)
BLD GP AB SCN SERPL QL: NEGATIVE — SIGNIFICANT CHANGE UP
BUN SERPL-MCNC: 31 MG/DL — HIGH (ref 7–23)
CALCIUM SERPL-MCNC: 7.5 MG/DL — LOW (ref 8.4–10.5)
CHLORIDE SERPL-SCNC: 107 MMOL/L — SIGNIFICANT CHANGE UP (ref 96–108)
CO2 SERPL-SCNC: 18 MMOL/L — LOW (ref 22–31)
CREAT SERPL-MCNC: 0.72 MG/DL — SIGNIFICANT CHANGE UP (ref 0.5–1.3)
CULTURE RESULTS: SIGNIFICANT CHANGE UP
GAS PNL BLDA: SIGNIFICANT CHANGE UP
GAS PNL BLDA: SIGNIFICANT CHANGE UP
GLUCOSE SERPL-MCNC: 176 MG/DL — HIGH (ref 70–99)
GRAM STN FLD: SIGNIFICANT CHANGE UP
HCT VFR BLD CALC: 26.6 % — LOW (ref 34.5–45)
HGB BLD-MCNC: 8.6 G/DL — LOW (ref 11.5–15.5)
INR BLD: 1.22 RATIO — HIGH (ref 0.88–1.16)
MAGNESIUM SERPL-MCNC: 2 MG/DL — SIGNIFICANT CHANGE UP (ref 1.6–2.6)
MCHC RBC-ENTMCNC: 28.9 PG — SIGNIFICANT CHANGE UP (ref 27–34)
MCHC RBC-ENTMCNC: 32.3 GM/DL — SIGNIFICANT CHANGE UP (ref 32–36)
MCV RBC AUTO: 89.6 FL — SIGNIFICANT CHANGE UP (ref 80–100)
METHOD TYPE: SIGNIFICANT CHANGE UP
ORGANISM # SPEC MICROSCOPIC CNT: SIGNIFICANT CHANGE UP
ORGANISM # SPEC MICROSCOPIC CNT: SIGNIFICANT CHANGE UP
PHOSPHATE SERPL-MCNC: 2.7 MG/DL — SIGNIFICANT CHANGE UP (ref 2.5–4.5)
PLATELET # BLD AUTO: 288 K/UL — SIGNIFICANT CHANGE UP (ref 150–400)
POTASSIUM SERPL-MCNC: 3.5 MMOL/L — SIGNIFICANT CHANGE UP (ref 3.5–5.3)
POTASSIUM SERPL-SCNC: 3.5 MMOL/L — SIGNIFICANT CHANGE UP (ref 3.5–5.3)
PROTHROM AB SERPL-ACNC: 13.2 SEC — HIGH (ref 9.8–12.7)
RBC # BLD: 2.97 M/UL — LOW (ref 3.8–5.2)
RBC # FLD: 17.4 % — HIGH (ref 10.3–14.5)
RH IG SCN BLD-IMP: NEGATIVE — SIGNIFICANT CHANGE UP
SODIUM SERPL-SCNC: 139 MMOL/L — SIGNIFICANT CHANGE UP (ref 135–145)
SPECIMEN SOURCE: SIGNIFICANT CHANGE UP
SPECIMEN SOURCE: SIGNIFICANT CHANGE UP
WBC # BLD: 11.2 K/UL — HIGH (ref 3.8–10.5)
WBC # FLD AUTO: 11.2 K/UL — HIGH (ref 3.8–10.5)

## 2018-08-02 PROCEDURE — 71045 X-RAY EXAM CHEST 1 VIEW: CPT | Mod: 26,77

## 2018-08-02 PROCEDURE — 99232 SBSQ HOSP IP/OBS MODERATE 35: CPT | Mod: GC

## 2018-08-02 PROCEDURE — 71045 X-RAY EXAM CHEST 1 VIEW: CPT | Mod: 26

## 2018-08-02 PROCEDURE — 99291 CRITICAL CARE FIRST HOUR: CPT

## 2018-08-02 PROCEDURE — 93010 ELECTROCARDIOGRAM REPORT: CPT

## 2018-08-02 RX ORDER — DEXTROSE 50 % IN WATER 50 %
25 SYRINGE (ML) INTRAVENOUS ONCE
Qty: 0 | Refills: 0 | Status: COMPLETED | OUTPATIENT
Start: 2018-08-02 | End: 2018-08-02

## 2018-08-02 RX ORDER — ACETAMINOPHEN 500 MG
1000 TABLET ORAL ONCE
Qty: 0 | Refills: 0 | Status: DISCONTINUED | OUTPATIENT
Start: 2018-08-02 | End: 2018-08-02

## 2018-08-02 RX ORDER — FAMOTIDINE 10 MG/ML
20 INJECTION INTRAVENOUS
Qty: 0 | Refills: 0 | Status: DISCONTINUED | OUTPATIENT
Start: 2018-08-02 | End: 2018-08-03

## 2018-08-02 RX ORDER — POTASSIUM CHLORIDE 20 MEQ
20 PACKET (EA) ORAL
Qty: 0 | Refills: 0 | Status: COMPLETED | OUTPATIENT
Start: 2018-08-02 | End: 2018-08-02

## 2018-08-02 RX ORDER — IPRATROPIUM/ALBUTEROL SULFATE 18-103MCG
3 AEROSOL WITH ADAPTER (GRAM) INHALATION EVERY 6 HOURS
Qty: 0 | Refills: 0 | Status: DISCONTINUED | OUTPATIENT
Start: 2018-08-02 | End: 2018-08-03

## 2018-08-02 RX ORDER — INSULIN LISPRO 100/ML
VIAL (ML) SUBCUTANEOUS EVERY 4 HOURS
Qty: 0 | Refills: 0 | Status: DISCONTINUED | OUTPATIENT
Start: 2018-08-02 | End: 2018-08-03

## 2018-08-02 RX ORDER — DEXMEDETOMIDINE HYDROCHLORIDE IN 0.9% SODIUM CHLORIDE 4 UG/ML
0.04 INJECTION INTRAVENOUS
Qty: 200 | Refills: 0 | Status: DISCONTINUED | OUTPATIENT
Start: 2018-08-02 | End: 2018-08-03

## 2018-08-02 RX ORDER — POTASSIUM PHOSPHATE, MONOBASIC POTASSIUM PHOSPHATE, DIBASIC 236; 224 MG/ML; MG/ML
15 INJECTION, SOLUTION INTRAVENOUS ONCE
Qty: 0 | Refills: 0 | Status: COMPLETED | OUTPATIENT
Start: 2018-08-02 | End: 2018-08-02

## 2018-08-02 RX ORDER — HYDROMORPHONE HYDROCHLORIDE 2 MG/ML
0.5 INJECTION INTRAMUSCULAR; INTRAVENOUS; SUBCUTANEOUS ONCE
Qty: 0 | Refills: 0 | Status: DISCONTINUED | OUTPATIENT
Start: 2018-08-02 | End: 2018-08-02

## 2018-08-02 RX ORDER — HYDROMORPHONE HYDROCHLORIDE 2 MG/ML
0.5 INJECTION INTRAMUSCULAR; INTRAVENOUS; SUBCUTANEOUS
Qty: 0 | Refills: 0 | Status: DISCONTINUED | OUTPATIENT
Start: 2018-08-02 | End: 2018-08-03

## 2018-08-02 RX ORDER — DEXTROSE MONOHYDRATE, SODIUM CHLORIDE, AND POTASSIUM CHLORIDE 50; .745; 4.5 G/1000ML; G/1000ML; G/1000ML
1000 INJECTION, SOLUTION INTRAVENOUS
Qty: 0 | Refills: 0 | Status: DISCONTINUED | OUTPATIENT
Start: 2018-08-02 | End: 2018-08-03

## 2018-08-02 RX ORDER — POTASSIUM CHLORIDE 20 MEQ
10 PACKET (EA) ORAL
Qty: 0 | Refills: 0 | Status: DISCONTINUED | OUTPATIENT
Start: 2018-08-02 | End: 2018-08-02

## 2018-08-02 RX ORDER — HYDROMORPHONE HYDROCHLORIDE 2 MG/ML
1 INJECTION INTRAMUSCULAR; INTRAVENOUS; SUBCUTANEOUS ONCE
Qty: 0 | Refills: 0 | Status: DISCONTINUED | OUTPATIENT
Start: 2018-08-02 | End: 2018-08-02

## 2018-08-02 RX ADMIN — CEFTAROLINE FOSAMIL 50 MILLIGRAM(S): 600 POWDER, FOR SOLUTION INTRAVENOUS at 18:15

## 2018-08-02 RX ADMIN — Medication 50 MILLIEQUIVALENT(S): at 07:00

## 2018-08-02 RX ADMIN — DEXTROSE MONOHYDRATE, SODIUM CHLORIDE, AND POTASSIUM CHLORIDE 50 MILLILITER(S): 50; .745; 4.5 INJECTION, SOLUTION INTRAVENOUS at 09:15

## 2018-08-02 RX ADMIN — HYDROMORPHONE HYDROCHLORIDE 1 MILLIGRAM(S): 2 INJECTION INTRAMUSCULAR; INTRAVENOUS; SUBCUTANEOUS at 18:30

## 2018-08-02 RX ADMIN — Medication 2: at 17:56

## 2018-08-02 RX ADMIN — DEXMEDETOMIDINE HYDROCHLORIDE IN 0.9% SODIUM CHLORIDE 2.52 MICROGRAM(S)/KG/HR: 4 INJECTION INTRAVENOUS at 01:10

## 2018-08-02 RX ADMIN — Medication 25 MILLIGRAM(S): at 09:15

## 2018-08-02 RX ADMIN — HYDROMORPHONE HYDROCHLORIDE 0.5 MILLIGRAM(S): 2 INJECTION INTRAMUSCULAR; INTRAVENOUS; SUBCUTANEOUS at 01:50

## 2018-08-02 RX ADMIN — HEPARIN SODIUM 5000 UNIT(S): 5000 INJECTION INTRAVENOUS; SUBCUTANEOUS at 05:23

## 2018-08-02 RX ADMIN — Medication 50 MILLIEQUIVALENT(S): at 05:37

## 2018-08-02 RX ADMIN — CEFTAROLINE FOSAMIL 50 MILLIGRAM(S): 600 POWDER, FOR SOLUTION INTRAVENOUS at 05:22

## 2018-08-02 RX ADMIN — MIRTAZAPINE 45 MILLIGRAM(S): 45 TABLET, ORALLY DISINTEGRATING ORAL at 21:58

## 2018-08-02 RX ADMIN — HEPARIN SODIUM 5000 UNIT(S): 5000 INJECTION INTRAVENOUS; SUBCUTANEOUS at 21:58

## 2018-08-02 RX ADMIN — HYDROMORPHONE HYDROCHLORIDE 1 MILLIGRAM(S): 2 INJECTION INTRAMUSCULAR; INTRAVENOUS; SUBCUTANEOUS at 18:15

## 2018-08-02 RX ADMIN — Medication 100 MILLIGRAM(S): at 05:22

## 2018-08-02 RX ADMIN — Medication 25 MILLILITER(S): at 13:40

## 2018-08-02 RX ADMIN — DEXMEDETOMIDINE HYDROCHLORIDE IN 0.9% SODIUM CHLORIDE 0.5 MICROGRAM(S)/KG/HR: 4 INJECTION INTRAVENOUS at 17:56

## 2018-08-02 RX ADMIN — QUETIAPINE FUMARATE 50 MILLIGRAM(S): 200 TABLET, FILM COATED ORAL at 21:58

## 2018-08-02 RX ADMIN — SERTRALINE 50 MILLIGRAM(S): 25 TABLET, FILM COATED ORAL at 12:26

## 2018-08-02 RX ADMIN — DAPTOMYCIN 120 MILLIGRAM(S): 500 INJECTION, POWDER, LYOPHILIZED, FOR SOLUTION INTRAVENOUS at 12:26

## 2018-08-02 RX ADMIN — Medication 3 MILLILITER(S): at 17:33

## 2018-08-02 RX ADMIN — HEPARIN SODIUM 5000 UNIT(S): 5000 INJECTION INTRAVENOUS; SUBCUTANEOUS at 13:28

## 2018-08-02 RX ADMIN — FAMOTIDINE 20 MILLIGRAM(S): 10 INJECTION INTRAVENOUS at 05:26

## 2018-08-02 RX ADMIN — HYDROMORPHONE HYDROCHLORIDE 0.5 MILLIGRAM(S): 2 INJECTION INTRAMUSCULAR; INTRAVENOUS; SUBCUTANEOUS at 14:30

## 2018-08-02 RX ADMIN — Medication 2: at 09:03

## 2018-08-02 RX ADMIN — Medication 0.5 MILLIGRAM(S): at 05:22

## 2018-08-02 RX ADMIN — LIDOCAINE 1 PATCH: 4 CREAM TOPICAL at 09:02

## 2018-08-02 RX ADMIN — POTASSIUM PHOSPHATE, MONOBASIC POTASSIUM PHOSPHATE, DIBASIC 62.5 MILLIMOLE(S): 236; 224 INJECTION, SOLUTION INTRAVENOUS at 08:00

## 2018-08-02 RX ADMIN — LIDOCAINE 1 PATCH: 4 CREAM TOPICAL at 21:51

## 2018-08-02 RX ADMIN — Medication 100 MILLIGRAM(S): at 13:28

## 2018-08-02 RX ADMIN — CHLORHEXIDINE GLUCONATE 1 APPLICATION(S): 213 SOLUTION TOPICAL at 05:35

## 2018-08-02 RX ADMIN — Medication 3 MILLILITER(S): at 12:14

## 2018-08-02 RX ADMIN — Medication 0.5 MILLIGRAM(S): at 17:33

## 2018-08-02 RX ADMIN — Medication 10 MILLIGRAM(S): at 21:58

## 2018-08-02 RX ADMIN — Medication 1 MILLIGRAM(S): at 14:18

## 2018-08-02 RX ADMIN — DIPHENHYDRAMINE HYDROCHLORIDE AND LIDOCAINE HYDROCHLORIDE AND ALUMINUM HYDROXIDE AND MAGNESIUM HYDRO 5 MILLILITER(S): KIT at 12:27

## 2018-08-02 RX ADMIN — FAMOTIDINE 20 MILLIGRAM(S): 10 INJECTION INTRAVENOUS at 18:18

## 2018-08-02 RX ADMIN — Medication 0.25 MILLIGRAM(S): at 03:44

## 2018-08-02 NOTE — PROGRESS NOTE ADULT - ATTENDING COMMENTS
I saw and evaluated patient.  I agree with residents note.    patient developed labored breathing with use of accessory muscles.  Patient then intubated and applied volume control ventilation for acute respiratory failure related to recent surgery.  patient will require additional surgery in view of persistently positive blood cultures  continue antibiotics per infectious disease  will place gastric tube for enteral nutrition  to monitor gas exchange and hemodynamics after intubation  events and plan of care reviewed with the patients   40 minutes of critical care management applied

## 2018-08-02 NOTE — PROGRESS NOTE ADULT - ASSESSMENT
68F s/p R hip I&D with poly exchange POD6, L acetabulum fx  Pain control  WBAT RLE  FFWB LLE  Appreciate ID recs  Cont abx per ID  Plan for OR tomorrow 8/3 for explant R hip prosthesis with antibiotic spacer placement  Medical optimization/clearance for OR tomorrow  NPO after midnight tonight  Cont care per SICU  Will discuss with attending

## 2018-08-02 NOTE — PROGRESS NOTE ADULT - ASSESSMENT
68 year old female presenting with symptomatic hypoglycemia and septic shock secondary to an infected right hip s/p right hip I&D with hemiarthroplasty revision.    PLAN:    Neuro: acute post-op pain, anxiety, depression, dementia  - Monitor mental status.  - Dilaudid PRN pain.  - Home Zoloft, Seroquel, Remeron, and melatonin.    Resp: acute post-op insufficiency, asthma  - Monitor pulse oximeter.  - BiPAP overnight   - Pulmicort and Duoneb for asthma.    CV: septic vs. cardiogenic shock, resolved  - Monitor vital signs.    GI: no acute issues  - Continue regular diet   - Protonix for GERD.    Renal: CKD stage 2, hyponatremia  - Monitor I&Os.  - Monitor electrolytes and replete as necessary.  - IV locked     Heme: no acute issues  - Lovenox for VTE prophylaxis.    ID: MRSA bacteremia, infected right hip s/p I&D,   - Monitor WBC, temperature, and procalcitonin.  - Vancomycin and cefepime for empiric antibiotics.  - Will need MRI to rule out lumbar epidural abscess.    Endo: hypoglycemia (resolved), hyperglycemia, adrenal insufficiency  - Stress dose steroids taper for septic shock as patient is chronically on prednisone for her asthma.  - Monitor fingersticks q4hrs for hypoglycemia.    Misc: infected right hip s/p I&D  - WBAT RLE and foot flat weight bear LLE.    Disposition:  - Full code.  - Will remain in SICU for respiratory and hemodynamic monitoring.    - Scar Worley PA-C 68 year old female presenting with symptomatic hypoglycemia and septic shock secondary to an infected right hip s/p right hip I&D with hemiarthroplasty revision.    PLAN:    Neuro: acute post-op pain, anxiety, depression, dementia  - Monitor mental status.  - Dilaudid PRN pain.  - Home Zoloft, Seroquel, Remeron, and melatonin.    Resp: acute post-op insufficiency, asthma  - Monitor pulse oximeter.  - HFNC during the day; wean as tolerated.  - Non-invasive negative pressure ventilation overnight; wean as tolerated.  - Pulmicort and Duoneb for asthma.    CV: septic vs. cardiogenic shock, resolved  - Monitor vital signs.    GI: no acute issues  - Continue regular diet   - Protonix for GERD.    Renal: CKD stage 2, hyponatremia  - Monitor I&Os.  - Monitor electrolytes and replete as necessary.  - IV locked     Heme: no acute issues  - Lovenox for VTE prophylaxis.    ID: MRSA bacteremia, infected right hip s/p I&D,   - Monitor WBC, temperature, and procalcitonin.  - Vancomycin and cefepime for empiric antibiotics.    Endo: hypoglycemia (resolved), hyperglycemia, adrenal insufficiency  - Stress dose steroids taper for septic shock as patient is chronically on prednisone for her asthma.  - Monitor fingersticks q4hrs for hypoglycemia.    Misc: infected right hip s/p I&D  - WBAT RLE and foot flat weight bear LLE.    Disposition:  - Full code.  - Will remain in SICU for respiratory and hemodynamic monitoring.    GWYN Parkinson, PGY-2  [id number]

## 2018-08-02 NOTE — AIRWAY PLACEMENT NOTE ADULT - POST AIRWAY PLACEMENT ASSESSMENT:
breath sounds equal/positive end tidal CO2 noted
breath sounds equal/chest excursion noted/positive end tidal CO2 noted/breath sounds bilateral/CXR pending

## 2018-08-02 NOTE — CHART NOTE - NSCHARTNOTEFT_GEN_A_CORE
This patient has a R arina PJI w/MRSA.  Plan to OR tomorrow with Dr. Rey. Dr. Rey asked me to assist for the explant portion of the case.  I had a prolonged >30 minute discussion today with the patient's daughters, the patient was unable to participate in the conversation 2/2 sedation.  The patient has already been consented by Dr. Rey and his team but I again reviewed the nature of this diagnosis as well as possible treatment options including nonoperative treatment with intravenous antibiotics versus operative intervention which would be a 2-staged explantation of the orthopedic hardware, irrigation and debridement, with placement of antibiotic articulating hip stem spacer. The patient is an appropriate candidate for consideration of the surgical procedure. This recommendation is based on the patients current diagnosis of PJI and positive cultures. An extensive discussion was conducted of the natural history of this particular problem and the variety of surgical and non-surgical treatment options available to the patient. A risk/benefit analysis was discussed with the patient reviewing the advantages and disadvantages of surgical intervention at this time. A full explanation was given of the nature and the purpose of the procedure and anesthesia, its benefits, possible alternative methods of diagnosis or treatment, the risks involved, the possibility of complications, the foreseeable consequences of the procedure and the possible results of the non-treatment.    No guarantee or assurance was made as to the results that may be obtained. Specifically, the risks were identified to include, but are not limited to the following: Infection, phlebitis, pulmonary embolism, death, paralysis, future hip dislocation when reimplanted or spacer dislocation, chronic pain, stiffness, instability, limp, weakness, breakage, leg-length inequality, uncontrolled bleeding, nerve injury, blood vessel injury, pressure sores, anesthetic risks, delayed healing of wound and bone, and wear and loosening. The daughter's understand that she will not have a typical hip joint during the first stage of this treatment although after several months may function similar to a hip replacement if stable and will need to modulate her weightbearing to 20% weightbearing on the right leg for the first 6 weeks minimum with lifetime posterior hip precautions. They knows that if she bears full weight then she is at risk for breaking her bone around the implant, causing further erosion or displacement of the construct. Further discussion was undertaken with the patient about the details of surgical preparation, treatment, and postoperative rehabilitation including medical clearance, the hospital course, and the postoperative rehabilitation involved. No guarantee was made to the patient about our ability to clear this infection and I did not promise to reimplant a total hip arthroplasty at any point in the future. They understand that if she is unable to clear the infection then we may need to repeat the I&D and spacer exchange. This infection can become life threatening and she can die from this infection or from the treatment of the infection. She understands the risk to his heart, lungs, and kidneys from the surgery. She understands that if her wound fail to heal then she may require further plastic surgery intervention procedures which may include a muscle flap and/or skin graft. Reimplantation may require cemented or cementless components, or both, depending upon a variety of factors that must be assessed at the time of surgery. All in all, I feel that this patient is a good candidate for surgical intervention.    Appreciate ID consult, continue IV abx  NPO  Consent in chart for Dr. Rey  Patient will need PICC line likely postop when clears bacteremia  Hold DVT ppx after midnight  Please transfuse patient today to hct >30 in preparation for surgery  Will need 4u PRBC ONCOR    Kartik Shaffer MD  Attending Orthopedic Surgeon

## 2018-08-02 NOTE — CHART NOTE - NSCHARTNOTEFT_GEN_A_CORE
Pt seen for nutrition follow up, as per department protocol. Subjective diet/weight history obtained from  at bedside.    Source: Patient [X ]    Family [X ]     other [X ]; medical record, RN,  at bedside. Pt noted with AMS/Alzheimer's.    Hospital Course: Pt with history of dementia and right hip fracture S/P hemiarthroplasty (6/22/18) presenting with sternal fracture, L2 compression fracture, left comminuted acetabular fracture, and severe sepsis.    Per , patient's UBW = 100 pounds, however pt lost weight to 70 pounds in March secondary to poor appetite and hospitalization. Pt then regained weight to 100 pounds as appetite improved. Dosing wt (7/27) noted as 111 pounds. Per , pt with Alzheimer's, resulting in drastic changes in eating habits and food preferences. Pt cannot tolerate Ensure supplements or Mighty Shakes, but loves Magic Cup (290 calories, 9Gm protein per serving). MD approved addition of Mighty Cup to meal trays despite Consistent Carbohydrate diet restriction (for chronic steroid Rx). Food preferences taken to encourage greater po intake.     Diet : Consistent Carbohydrate diet with snack    Patient reports no GI distress, no chewing/swallowing difficulty     PO intake:  < 50% [ X] 50-75% [ ]   % [ ]  other :     Source for PO intake [ ] Patient [ X] family [ ] chart [ ] staff [ ] other     Enteral /Parenteral Nutrition: n/a    Current Weight: Weight (kg): 60.5Kg (8/2). 50.4Kg (7/27 dosing); UBW per  = 45.5Kg (100 pounds)  % Weight Change    Pertinent Medications: MEDICATIONS  (STANDING):  ALBUTerol/ipratropium for Nebulization 3 milliLiter(s) Nebulizer every 6 hours  buDESOnide   0.5 milliGRAM(s) Respule 0.5 milliGRAM(s) Inhalation every 12 hours  Ceftaroline Fosamil IVPB 600 milliGRAM(s) IV Intermittent every 12 hours  chlorhexidine 4% Liquid 1 Application(s) Topical <User Schedule>  DAPTOmycin IVPB      DAPTOmycin IVPB 500 milliGRAM(s) IV Intermittent every 24 hours  dextrose 5% + sodium chloride 0.45% with potassium chloride 20 mEq/L 1000 milliLiter(s) (50 mL/Hr) IV Continuous <Continuous>  docusate sodium 100 milliGRAM(s) Oral three times a day  famotidine    Tablet 20 milliGRAM(s) Oral two times a day  FIRST- Mouthwash  BLM 5 milliLiter(s) Swish and Spit daily  heparin  Injectable 5000 Unit(s) SubCutaneous every 8 hours  insulin lispro (HumaLOG) corrective regimen sliding scale   SubCutaneous at bedtime  insulin lispro (HumaLOG) corrective regimen sliding scale   SubCutaneous three times a day before meals  lidocaine   Patch 1 Patch Transdermal every 24 hours  melatonin 3 milliGRAM(s) Oral <User Schedule>  mirtazapine 45 milliGRAM(s) Oral <User Schedule>  QUEtiapine 50 milliGRAM(s) Oral <User Schedule>  senna 2 Tablet(s) Oral at bedtime  sertraline 50 milliGRAM(s) Oral daily    MEDICATIONS  (PRN):  acetaminophen   Tablet. 975 milliGRAM(s) Oral every 6 hours PRN Mild Pain (1 - 3)  traMADol 50 milliGRAM(s) Oral every 6 hours PRN Severe Pain (7 - 10)    Pertinent Labs:  08-02 Na139 mmol/L Glu 176 mg/dL<H> K+ 3.5 mmol/L Cr  0.72 mg/dL BUN 31 mg/dL<H> 08-02 Phos 2.7 mg/dL 07-29 Alb 2.9 g/dL<L> 07-27 [id number] 5.5 %      Skin:     Estimated Needs:   [ ] no change since previous assessment  [ ] recalculated:       Previous Nutrition Diagnosis:     [ ] Inadequate Energy Intake [ ]Inadequate Oral Intake [ ] Excessive Energy Intake     [ ] Underweight [ ] Increased Nutrient Needs [ ] Overweight/Obesity     [ ] Altered GI Function [ ] Unintended Weight Loss [ ] Food & Nutrition Related Knowledge Deficit [ ] Malnutrition          Nutrition Diagnosis is [ ] ongoing  [ ] resolved [ ] not applicable          New Nutrition Diagnosis: [ ] not applicable    [ ] Inadequate Protein Energy Intake [ ]Inadequate Oral Intake [ ] Excessive Energy Intake     [ ] Underweight [ ] Increased Nutrient Needs [ ] Overweight/Obesity     [ ] Altered GI Function [ ] Unintended Weight Loss [ ] Food & Nutrition Related Knowledge Deficit[ ] Limited Adherence to nutrition related recommendations [ ] Malnutrition  [ ] other: Free text       Related to:      As evidenced by:      Interventions:     Recommend    [ ] Change Diet To:    [ ] Nutrition Supplement    [ ] Nutrition Support    [ ] Other:        Monitoring and Evaluation:     [ ] PO intake [ ] Tolerance to diet prescription [ ] weights [ ] follow up per protocol    [ ] other: Pt seen for nutrition follow up, as per department protocol. Subjective diet/weight history obtained from  at bedside.    Source: Patient [X ]    Family [X ]     other [X ]; medical record, RN,  at bedside. Pt noted with AMS/Alzheimer's.    Hospital Course: Pt with history of dementia and right hip fracture S/P hemiarthroplasty (6/22/18) presenting with sternal fracture, L2 compression fracture, left comminuted acetabular fracture, and severe sepsis.    Per , patient's UBW = 100 pounds, however pt lost weight to 70 pounds in March secondary to poor appetite and hospitalization. Pt then regained weight to 100 pounds as appetite improved. Dosing wt (7/27) noted as 111 pounds. Per , pt with Alzheimer's, resulting in drastic changes in eating habits and food preferences. Pt cannot tolerate Ensure supplements or Mighty Shakes, but loves Magic Cup (290 calories, 9Gm protein per serving). MD approved addition of Mighty Cup to meal trays despite Consistent Carbohydrate diet restriction (for chronic steroid Rx). Food preferences taken for calorically dense snacks (Greek yogurt, crackers/peanut butter, apple slices), to encourage greater po intake.     Diet : Consistent Carbohydrate diet with snack    Patient reports no GI distress, no chewing/swallowing difficulty     PO intake:  < 50% [ X] 50-75% [ ]   % [ ]  other :  reports pt is eating only a few bites of foods; claims to dislike previously favorite foods.     Source for PO intake [ ] Patient [ X] family [ ] chart [ ] staff [ ] other     Enteral /Parenteral Nutrition: n/a    Current Weight: Weight (kg): 60.5Kg (8/2). 50.4Kg (7/27 dosing); UBW per  = 45.5Kg (100 pounds)  Edema: none noted    Pertinent Medications: MEDICATIONS  (STANDING):  ALBUTerol/ipratropium for Nebulization 3 milliLiter(s) Nebulizer every 6 hours  buDESOnide   0.5 milliGRAM(s) Respule 0.5 milliGRAM(s) Inhalation every 12 hours  Ceftaroline Fosamil IVPB 600 milliGRAM(s) IV Intermittent every 12 hours  chlorhexidine 4% Liquid 1 Application(s) Topical <User Schedule>  DAPTOmycin IVPB      DAPTOmycin IVPB 500 milliGRAM(s) IV Intermittent every 24 hours  dextrose 5% + sodium chloride 0.45% with potassium chloride 20 mEq/L 1000 milliLiter(s) (50 mL/Hr) IV Continuous <Continuous>  docusate sodium 100 milliGRAM(s) Oral three times a day  famotidine    Tablet 20 milliGRAM(s) Oral two times a day  FIRST- Mouthwash  BLM 5 milliLiter(s) Swish and Spit daily  heparin  Injectable 5000 Unit(s) SubCutaneous every 8 hours  insulin lispro (HumaLOG) corrective regimen sliding scale   SubCutaneous at bedtime  insulin lispro (HumaLOG) corrective regimen sliding scale   SubCutaneous three times a day before meals  lidocaine   Patch 1 Patch Transdermal every 24 hours  melatonin 3 milliGRAM(s) Oral <User Schedule>  mirtazapine 45 milliGRAM(s) Oral <User Schedule>  QUEtiapine 50 milliGRAM(s) Oral <User Schedule>  senna 2 Tablet(s) Oral at bedtime  sertraline 50 milliGRAM(s) Oral daily    MEDICATIONS  (PRN):  acetaminophen   Tablet. 975 milliGRAM(s) Oral every 6 hours PRN Mild Pain (1 - 3)  traMADol 50 milliGRAM(s) Oral every 6 hours PRN Severe Pain (7 - 10)    Pertinent Labs:  08-02 Na139 mmol/L Glu 176 mg/dL<H> K+ 3.5 mmol/L Cr  0.72 mg/dL BUN 31 mg/dL<H> 08-02 Phos 2.7 mg/dL 07-29 Alb 2.9 g/dL<L> 07-27 [id number] 5.5 %    CAPILLARY BLOOD GLUCOSE  POCT Blood Glucose.: 154 mg/dL (02 Aug 2018 09:00)  POCT Blood Glucose.: 100 mg/dL (01 Aug 2018 22:07)  POCT Blood Glucose.: 34 mg/dL (01 Aug 2018 22:04)  POCT Blood Glucose.: 164 mg/dL (01 Aug 2018 16:17)  POCT Blood Glucose.: 115 mg/dL (01 Aug 2018 11:14)    Skin: DTI left/right heels    Estimated Needs: recalculated per ICU guidelines, based on stated UBW 45.5Kg   6199-2751 tere/day (25-30cal/Kg per 45.5Kg)  64-73 Gm protein/day (1.4-1.6Gm/Kg per 45.5Kg)    Previous Nutrition Diagnosis:    [X ] Increased Nutrient Needs     Nutrition Diagnosis is [X ] ongoing; being addressed with po diet, modular food supplements and calorically dense snacks    New Nutrition Diagnosis: [ X] not applicable    Interventions:     Recommend:  1) Consider liberalizing diet to Regular for greater food choices. Honor food preferences as able.  2) Magic Cup (290 calories, 9Gm protein per serving) added to all meal trays.  3) Monitor weight, lab values, skin, nutrition provision and GI tolerance  4) Diet education not appropriate in current setting    Monitoring and Evaluation:   Follow up per protocol  RD to remain available for further nutritional interventions as indicated.   Cyndi Haddad, MS RD [facility], #[phone].

## 2018-08-02 NOTE — PROGRESS NOTE ADULT - SUBJECTIVE AND OBJECTIVE BOX
---___---___---___---___---___---___ ---___---___---___---___---___---___---___---___---___---                    <<<  M E D I C A L   A T T E N D I N G    F O L L O W    U P   N O T E  >>>    sitting in chair no distress . family at bedside.     ---___---___---___---___---___---      <<<  MEDICATIONS:  >>>    MEDICATIONS  (STANDING):  ALBUTerol/ipratropium for Nebulization 3 milliLiter(s) Nebulizer every 6 hours  buDESOnide   0.5 milliGRAM(s) Respule 0.5 milliGRAM(s) Inhalation every 12 hours  Ceftaroline Fosamil IVPB 600 milliGRAM(s) IV Intermittent every 12 hours  chlorhexidine 4% Liquid 1 Application(s) Topical <User Schedule>  DAPTOmycin IVPB      DAPTOmycin IVPB 500 milliGRAM(s) IV Intermittent every 24 hours  dextrose 5% + sodium chloride 0.45% with potassium chloride 20 mEq/L 1000 milliLiter(s) (50 mL/Hr) IV Continuous <Continuous>  docusate sodium 100 milliGRAM(s) Oral three times a day  famotidine    Tablet 20 milliGRAM(s) Oral two times a day  FIRST- Mouthwash  BLM 5 milliLiter(s) Swish and Spit daily  heparin  Injectable 5000 Unit(s) SubCutaneous every 8 hours  insulin lispro (HumaLOG) corrective regimen sliding scale   SubCutaneous at bedtime  insulin lispro (HumaLOG) corrective regimen sliding scale   SubCutaneous three times a day before meals  lidocaine   Patch 1 Patch Transdermal every 24 hours  melatonin 3 milliGRAM(s) Oral <User Schedule>  mirtazapine 45 milliGRAM(s) Oral <User Schedule>  QUEtiapine 50 milliGRAM(s) Oral <User Schedule>  senna 2 Tablet(s) Oral at bedtime  sertraline 50 milliGRAM(s) Oral daily      MEDICATIONS  (PRN):  acetaminophen   Tablet. 975 milliGRAM(s) Oral every 6 hours PRN Mild Pain (1 - 3)  traMADol 50 milliGRAM(s) Oral every 6 hours PRN Severe Pain (7 - 10)       ---___---___---___---___---___---     <<<REVIEW OF SYSTEM: >>>  unable to obtain      ---___---___---___---___---___---          <<<  VITAL SIGNS: >>>    T(F): 98.1 (08-02-18 @ 08:00), Max: 98.4 (08-01-18 @ 15:00)  HR: 73 (08-02-18 @ 09:07) (59 - 113)  BP: 132/74 (08-02-18 @ 08:00) (123/56 - 163/72)  RR: 25 (08-02-18 @ 09:07) (12 - 41)  SpO2: 100% (08-02-18 @ 09:07) (91% - 100%)  Wt(kg): --  CAPILLARY BLOOD GLUCOSE      POCT Blood Glucose.: 154 mg/dL (02 Aug 2018 09:00)    I&O's Summary    01 Aug 2018 07:01  -  02 Aug 2018 07:00  --------------------------------------------------------  IN: 531 mL / OUT: 885 mL / NET: -354 mL    02 Aug 2018 07:01  -  02 Aug 2018 09:40  --------------------------------------------------------  IN: 131.4 mL / OUT: 160 mL / NET: -28.6 mL         ---___---___---___---___---___---                       PHYSICAL EXAM:    GEN: A&O X 1, NAD , comfortable  HEENT: NCAT, PERRL, MMM, no scleral icterus, hearing intact  NECK: Supple, No JVD  CVS: S1S2 , regular , No M/R/G appreciated  PULM: CTA B/L,  no W/R/R appreciated  ABD.: soft. non tender, non distended,  bowel sounds present  Extrem: intact pulses , no edema noted surgical site  right side hip noted   Derm: No rash or ecchymosis noted       ---___---___---___---___---___---     <<<  LAB AND IMAGING: >>>                          8.6    11.2  )-----------( 288      ( 02 Aug 2018 03:30 )             26.6               08-02    139  |  107  |  31<H>  ----------------------------<  176<H>  3.5   |  18<L>  |  0.72    Ca    7.5<L>      02 Aug 2018 03:30  Phos  2.7     08-02  Mg     2.0     08-02      PT/INR - ( 02 Aug 2018 03:30 )   PT: 13.2 sec;   INR: 1.22 ratio         PTT - ( 02 Aug 2018 03:30 )  PTT:27.7 sec                   ABG - ( 01 Aug 2018 05:05 )  pH, Arterial: 7.42  pH, Blood: x     /  pCO2: 29    /  pO2: 74    / HCO3: 19    / Base Excess: -4.6  /  SaO2: 95                      [All pertinent / recent available Imaging reports and other labs reviewed]     ---___---___---___---___---___---___ ---___---___---___---___---           <<<  A S S E S S M E N T   A N D   P L A N :  >>>          -GI/DVT Prophylaxis.    --------------------------------------------       >>______________________<<      Deniz Bolden .         phone   [phone]

## 2018-08-02 NOTE — PROGRESS NOTE ADULT - SUBJECTIVE AND OBJECTIVE BOX
Pt S/E at bedside, no acute events overnight, pain controlled. Pt on 1 to 1 as patient was on BIPAP and continually tried to remove mask. Currently sitting in chair this AM, resting comfortably. No complaints.    Vital Signs Last 24 Hrs  T(C): 36.8 (02 Aug 2018 03:00), Max: 36.9 (01 Aug 2018 15:00)  T(F): 98.3 (02 Aug 2018 03:00), Max: 98.4 (01 Aug 2018 15:00)  HR: 76 (02 Aug 2018 05:28) (59 - 113)  BP: 139/72 (02 Aug 2018 05:00) (123/56 - 163/72)  BP(mean): 99 (02 Aug 2018 05:00) (81 - 106)  RR: 27 (02 Aug 2018 05:27) (12 - 41)  SpO2: 100% (02 Aug 2018 05:28) (91% - 100%)    Gen: NAD, awake/alert    Right Lower Extremity:  Dressing clean dry intact, +HMV x2  Grossly moving RLE/foot  +DP/PT Pulses  Cap refill brisk  Compartments soft  No calf TTP B/L

## 2018-08-02 NOTE — PROGRESS NOTE ADULT - ASSESSMENT
67 yo female with dementia, history of UC, and s/p RT Hip hemiarthroplasty 6/22/18  postsurgical hematoma  admitted with low grade fever, rigors, increased confusion, found to have leukocytosis, 20% bands, ICU, pressor use   Bld Cxs 7/26-7/31 all MRSA  s/p R hip I&D, lavage, poly exchange- pus encountered, all specimens S aureus  Remains on Vent,  Afebrile, Creat stable  Now on Dapto and ceftaroline, but cultures remain positive    Plan:  cont combo Tx with Dapto and Ceftaroline for now  downgrade to Dapto/Rifampin might be an option once bacteremia controlled  will need eventual PICC line once bacteremia is controlled for long term antibiotics  f/u repeat blood cult  awaiting GAYATHRI  source control remains most important issue, reviewed with family at bedside  Ortho input appreciated, awaiting OR on Friday for removal of hardware: spacer vs griddle stone 67 yo female with dementia, history of UC, and s/p RT Hip hemiarthroplasty 6/22/18  postsurgical hematoma  admitted with low grade fever, rigors, increased confusion, found to have leukocytosis, 20% bands, ICU, pressor use   Bld Cxs 7/26-8/1 still positive all MRSA  s/p R hip I&D, lavage, poly exchange- pus encountered, all specimens S aureus  Afebrile, Creat stable  Now on Dapto and ceftaroline, but cultures remain positive  GAYATHRI: no evidence of valvular vegetation    Plan:  cont combo Tx with Dapto and Ceftaroline for now  downgrade to Dapto/Rifampin might be an option once bacteremia controlled  will need eventual PICC line once bacteremia is controlled for long term antibiotics  f/u repeat blood cult, change line if possible, will d/w ICU team  source control remains most important issue, reviewed with family at bedside  Ortho input appreciated, awaiting OR on Friday for removal of hardware/spacer 69 yo female with dementia, history of UC, and s/p RT Hip hemiarthroplasty 6/22/18  postsurgical hematoma  admitted with low grade fever, rigors, increased confusion, found to have leukocytosis, 20% bands, ICU, pressor use   Bld Cxs 7/26-8/1 still positive all MRSA  s/p R hip I&D, lavage, poly exchange- pus encountered, all specimens S aureus  Afebrile, Creat stable  Now on Dapto and ceftaroline, but cultures remain positive  GAYATHRI: no evidence of valvular vegetation    Plan:  cont combo Tx with Dapto and Ceftaroline for now  downgrade to Dapto/Rifampin might be an option once bacteremia controlled  f/u repeat blood cult, change line if possible, will d/w ICU team  source control remains most important issue, reviewed with family at bedside  Ortho input appreciated, awaiting OR on Friday for removal of hardware/spacer  d/w ICU team, would change lines in OR as well in view of persistent bacteremia, eventual transition to PICC for long term abx once bacteremia controlled

## 2018-08-02 NOTE — PROGRESS NOTE ADULT - SUBJECTIVE AND OBJECTIVE BOX
HISTORY  68y Female past medical history of asthma on steroids, CVA (no residual deficits), pulmonary HTN, HLD, GERD, ulcerative colitis, fatty pancreas, impaired glucose tolerance, anxiety, and depression who presented on 7/26/2018 with altered mental status, rigors, and urinary frequency. Of note, patient was recently hospitalized for a right femoral neck fracture secondary to osteoporosis s/p hemiarthroplasty on 6/22/2018. She had been taking Plavix for her history of CVA and Lovenox for VTE prophylaxis but she developed a hematoma at her surgical site so the Lovenox was stopped. Since then, patient has been progressively more agitated, confused, and weak. Additionally, patient has been complaining of new onset low back pain and left hip pain. In the ED, patient was hemodynamically stable but noted to be hypoglycemic to the 20s so she received 2 amps of D50. She was also given 2 L of crystalloid and started on meropenem & vancomycin for concern of sepsis. CT scan revealed a 17 cm collection adjacent to the right hip prosthesis, an acute L2 compression fracture, and new left acetabular fracture. Patient was admitted to medicine but on 7/27/2018, patient was noted to be more altered, rigorous, tachypneic, febrile to 101 F, and hypotensive w/ SBP in the 60s so an RRT was called. She was given 1 L of crystalloid and started on a norepinephrine gtt. Patient subsequently admitted to SICU for hemodynamic monitoring. An emergent right axillary arterial line and left IJ central venous catheter was placed.    24 HOUR EVENTS: Pt did okay with non-invasive positive pressure ventilation at 12/5 in the morning but was agitated. Her agitation decreased once she was started on HFNC at 40/40. She was able to sit in the chair, have dinner, and interact with her family. Overnight, she was placed back on non-invasive positive pressure ventilation and was given a one time dose of Ativan and was placed under constant observation to help re-orient her in order to tolerate the mask better.    SUBJECTIVE/ROS:  [x] A ten-point review of systems was otherwise negative except as noted.  [ ] Due to altered mental status/intubation, subjective information were not able to be obtained from the patient. History was obtained, to the extent possible, from review of the chart and collateral sources of information.      NEURO  RASS:  -2   Meds: acetaminophen   Tablet. 975 milliGRAM(s) Oral every 6 hours PRN Mild Pain (1 - 3)  dexmedetomidine Infusion 0.2 MICROgram(s)/kG/Hr IV Continuous <Continuous>  melatonin 3 milliGRAM(s) Oral <User Schedule>  mirtazapine 45 milliGRAM(s) Oral <User Schedule>  QUEtiapine 50 milliGRAM(s) Oral <User Schedule>  sertraline 50 milliGRAM(s) Oral daily  traMADol 50 milliGRAM(s) Oral every 6 hours PRN Severe Pain (7 - 10)    [x] Adequacy of sedation and pain control has been assessed and adjusted      RESPIRATORY  RR: 14 (08-01-18 @ 00:00) (14 - 44)  SpO2: 100% (08-01-18 @ 00:14) (94% - 100%)  Exam: unlabored, clear to auscultation bilaterally  Mechanical Ventilation: none  ABG - ( 31 Jul 2018 21:15 )  pH: 7.49  /  pCO2: 22    /  pO2: 196   / HCO3: 16    / Base Excess: -5.8  /  SaO2: 100     Lactate: x      [N/A] Extubation Readiness Assessed  Meds: ALBUTerol/ipratropium for Nebulization 3 milliLiter(s) Nebulizer every 6 hours PRN Shortness of Breath and/or Wheezing  buDESOnide   0.5 milliGRAM(s) Respule 0.5 milliGRAM(s) Inhalation every 12 hours      CARDIOVASCULAR  HR: 75 (08-01-18 @ 00:14) (75 - 123)  BP: 131/61 (08-01-18 @ 00:00) (123/86 - 160/84)  BP(mean): 88 (08-01-18 @ 00:00) (88 - 114)  ABP: 128/63 (07-31-18 @ 17:00) (114/55 - 162/87)  ABP(mean): 92 (07-31-18 @ 17:00) (79 - 122)  VBG - ( 30 Jul 2018 03:22 )  pH: 7.34  /  pCO2: 32    /  pO2: 35    / HCO3: 17    / Base Excess: -7.3  /  SaO2: 62     Lactate: 1.2    Exam: regular rate and rhythm  Cardiac Rhythm: sinus  Perfusion     [x]Adequate   [ ]Inadequate  Mentation   [x]Normal       [ ]Reduced  Extremities  [x]Warm         [ ]Cool  Volume Status [ ]Hypervolemic [x]Euvolemic [ ]Hypovolemic  Meds: none      GI/NUTRITION  Exam: soft, nontender, nondistended, incision C/D/I  Diet: regular diet   Meds: docusate sodium 100 milliGRAM(s) Oral three times a day  famotidine    Tablet 20 milliGRAM(s) Oral two times a day  senna 2 Tablet(s) Oral at bedtime      GENITOURINARY  I&O's Detail    07-30 @ 07:01  -  07-31 @ 07:00  --------------------------------------------------------  IN:    dexmedetomidine Infusion: 100.9 mL    dexmedetomidine Infusion: 45 mL    dextrose 5%: 525 mL    dextrose 5% + sodium chloride 0.45%: 200 mL    heparin Infusion: 63 mL    IV PiggyBack: 300 mL    multiple electrolytes Injection Type 1multiple electrolytes Injection Type 1: 500 mL    propofol Infusion: 12.1 mL    sodium chloride 0.9%: 300 mL    Solution: 200 mL    Solution: 250 mL  Total IN: 2496 mL    OUT:    Accordian: 10 mL    Accordian: 240 mL    Indwelling Catheter - Urethral: 2190 mL  Total OUT: 2440 mL    Total NET: 56 mL      07-31 @ 07:01  -  08-01 @ 01:07  --------------------------------------------------------  IN:    dexmedetomidine Infusion: 93.1 mL    IV PiggyBack: 100 mL    multiple electrolytes Injection Type 1multiple electrolytes Injection Type 1: 100 mL    Oral Fluid: 100 mL    Packed Red Blood Cells: 300 mL  Total IN: 693.1 mL    OUT:    Accordian: 20 mL    Accordian: 120 mL    Indwelling Catheter - Urethral: 500 mL  Total OUT: 640 mL    Total NET: 53.1 mL                      8.7                  x    | x    | x            13.3  >-----------< 244     ------------------------< x                     26.0                 x    | x    | x                                            Ca x     Mg x     Ph x        [x] Blackman catheter, indication: urine output monitoring in critically ill   Meds: none      HEMATOLOGIC  Meds: heparin  Injectable 5000 Unit(s) SubCutaneous every 8 hours    [x] VTE Prophylaxis      Transfusion     [ ] PRBC   [ ] Platelets   [ ] FFP   [ ] Cryoprecipitate      INFECTIOUS DISEASES  WBC Count: 13.3 K/uL (07-31 @ 14:47)  WBC Count: 7.4 K/uL (07-31 @ 06:56)    RECENT CULTURES:  Specimen Source: .Blood Blood-Venous  Date/Time: 07-30 @ 05:44  Culture Results:   Growth in aerobic bottle: Gram Positive Cocci in Clusters  Gram Stain:   Growth in aerobic bottle: Gram Positive Cocci in Clusters  Organism: --  Specimen Source: .Blood Blood-Venous  Date/Time: 07-29 @ 08:29  Culture Results:   Growth in aerobic and anaerobic bottles: Methicillin resistant  Staphylococcus aureus  See previous culture [id number]  Gram Stain:   Growth in aerobic bottle: Gram Positive Cocci in Clusters  Growth in anaerobic bottle: Gram Positive Cocci in Clusters  Organism: --  Specimen Source: .Blood Blood-Venous  Date/Time: 07-29 @ 06:52  Culture Results:   Growth in aerobic and anaerobic bottles: Methicillin resistant  Staphylococcus aureus  See previous culture [id number]  Gram Stain:   Growth in anaerobic bottle: Gram Positive Cocci in Clusters  Growth in aerobic bottle:  Gram Positive Cocci in Clusters  Organism: --  Specimen Source: .Blood Blood  Date/Time: 07-28 @ 05:27  Culture Results:   Growth in aerobic and anaerobic bottles: Methicillin resistant  Staphylococcus aureus  See previous culture [id number]  Gram Stain:   Growth in aerobic bottle: Gram Positive Cocci in Clusters  Growth in anaerobic bottle: Gram Positive Cocci in Clusters  Organism: --  Specimen Source: .Tissue Other, right hip fluid superficial  Date/Time: 07-27 @ 22:56  Culture Results:   Testing in progress  Gram Stain: --  Organism: --  Specimen Source: .Tissue 1 right hip fluid superficial  Date/Time: 07-27 @ 22:54  Culture Results:   Moderate Methicillin resistant Staphylococcus aureus  Gram Stain:   No polymorphonuclear cells seen per low power field  Rare Gram variable coccobacilli per oil power field  Organism: Methicillin resistant Staphylococcus aureus  Specimen Source: .Other Other, right hip fluid deep  Date/Time: 07-27 @ 22:34  Culture Results:   Testing in progress  Gram Stain: --  Organism: Methicillin resistant Staphylococcus aureus  Specimen Source: .Tissue Other, 2 right hip tissue  Date/Time: 07-27 @ 21:26  Culture Results:   Testing in progress  Gram Stain:   No polymorphonuclear cells seen per low power field  No organisms seen per oil power field  Organism: Methicillin resistant Staphylococcus aureus  Specimen Source: .Tissue Other, right hip soft tissue  Date/Time: 07-27 @ 21:22  Culture Results:   Few Methicillin resistant Staphylococcus aureus  Gram Stain:   No polymorphonuclear cells seen per low power field  No organisms seen per oil power field  Organism: Methicillin resistant Staphylococcus aureus  Specimen Source: .Blood Blood  Date/Time: 07-27 @ 08:24  Culture Results:   Growth in aerobic and anaerobic bottles: Methicillin resistant  Staphylococcus aureus  See previous culture [id number]  Gram Stain:   Growth in anaerobic bottle: Gram Positive Cocci in Clusters  Growth in aerobic bottle: Gram Positive Cocci in Clusters  Organism: --    Meds: Ceftaroline Fosamil IVPB 600 milliGRAM(s) IV Intermittent every 12 hours  DAPTOmycin IVPB      DAPTOmycin IVPB 500 milliGRAM(s) IV Intermittent every 24 hours        ENDOCRINE  CAPILLARY BLOOD GLUCOSE      POCT Blood Glucose.: 149 mg/dL (31 Jul 2018 22:52)  POCT Blood Glucose.: 138 mg/dL (31 Jul 2018 17:37)  POCT Blood Glucose.: 209 mg/dL (31 Jul 2018 11:07)    Meds: hydrocortisone sodium succinate Injectable 50 milliGRAM(s) IV Push every 12 hours  hydrocortisone sodium succinate Injectable 25 milliGRAM(s) IV Push every 12 hours  insulin lispro (HumaLOG) corrective regimen sliding scale   SubCutaneous at bedtime  insulin lispro (HumaLOG) corrective regimen sliding scale   SubCutaneous three times a day before meals        ACCESS DEVICES:  [x] Peripheral IV  [ ] Central Venous Line	[ ] R	[ ] L	[ ] IJ	[ ] Fem	[ ] SC	Placed:   [ ] Arterial Line		[ ] R	[ ] L	[ ] Fem	[ ] Rad	[ ] Ax	Placed:   [ ] PICC:					[ ] Mediport  [ ] Urinary Catheter, Date Placed:   [x] Necessity of urinary, arterial, and venous catheters discussed    OTHER MEDICATIONS:  chlorhexidine 4% Liquid 1 Application(s) Topical <User Schedule>  FIRST- Mouthwash  BLM 5 milliLiter(s) Swish and Spit daily  lidocaine   Patch 1 Patch Transdermal every 24 hours      CODE STATUS: full code       IMAGING: < from: CT Abdomen and Pelvis No Cont (07.26.18 @ 17:19) >  IMPRESSION:     A 17.0 cm collection along the right lateral thigh musculature adjacent   to the right hip prosthesis. Superimposed infection is not excluded.     New compression fracture of the L2 vertebral body as above. Correlate   with pending lumbar spine MRI.    Mildly depressed acute fracture of the sternal manubrium.    Acute comminuted fracture of the left acetabulum.    < end of copied text >

## 2018-08-02 NOTE — PROGRESS NOTE ADULT - SUBJECTIVE AND OBJECTIVE BOX
CC: f/u for high grade sustained MRSA bacteremia    Pt remains in ICU, afebrile, high flow NC    REVIEW OF SYSTEMS:  as above    Antimicrobials Day # 7  Ceftaroline Fosamil IVPB 600 milliGRAM(s) IV Intermittent every 12 hours  DAPTOmycin IVPB      DAPTOmycin IVPB 500 milliGRAM(s) IV Intermittent every 24 hours      Medications Reviewed    ICU Vital Signs Last 24 Hrs  T(C): 36.7 (02 Aug 2018 08:00), Max: 36.9 (01 Aug 2018 15:00)  T(F): 98.1 (02 Aug 2018 08:00), Max: 98.4 (01 Aug 2018 15:00)  HR: 73 (02 Aug 2018 09:07) (59 - 113)  BP: 132/74 (02 Aug 2018 08:00) (123/56 - 163/72)  BP(mean): 96 (02 Aug 2018 08:00) (81 - 106)  ABP: --  ABP(mean): --  RR: 25 (02 Aug 2018 09:07) (12 - 41)  SpO2: 100% (02 Aug 2018 09:07) (91% - 100%)          PHYSICAL EXAM:  General: no acute distress, now extubated, restless  on high flow NC  Eyes:  anicteric, no conjunctival injection, no discharge  Oropharynx: clear, no exudate  Neck: LIJ CVL  Lungs: clear to auscultation  Heart: regular rate and rhythm; no murmur, rubs or gallops  Abdomen: soft, nondistended, nontender, without mass or organomegaly  Blackman  Skin: R hip incision dressing intact; hemovac x 2  Extremities: no edema  Neurologic: awake, but restless, follows simple commands    LAB RESULTS:                                                         8.6    11.2  )-----------( 288      ( 02 Aug 2018 03:30 )             26.6   08-02    139  |  107  |  31<H>  ----------------------------<  176<H>  3.5   |  18<L>  |  0.72    Ca    7.5<L>      02 Aug 2018 03:30  Phos  2.7     08-02  Mg     2.0     08-02              MICROBIOLOGY:  RECENT CULTURES REVIEWED        RADIOLOGY REVIEWED CC: f/u for high grade sustained MRSA bacteremia    Pt remains in ICU, afebrile, high flow NC alternating with BIPAP    REVIEW OF SYSTEMS:  as above    Antimicrobials Day # 7  Ceftaroline Fosamil IVPB 600 milliGRAM(s) IV Intermittent every 12 hours  DAPTOmycin IVPB      DAPTOmycin IVPB 500 milliGRAM(s) IV Intermittent every 24 hours      Medications Reviewed    ICU Vital Signs Last 24 Hrs  T(C): 36.7 (02 Aug 2018 08:00), Max: 36.9 (01 Aug 2018 15:00)  T(F): 98.1 (02 Aug 2018 08:00), Max: 98.4 (01 Aug 2018 15:00)  HR: 73 (02 Aug 2018 09:07) (59 - 113)  BP: 132/74 (02 Aug 2018 08:00) (123/56 - 163/72)  BP(mean): 96 (02 Aug 2018 08:00) (81 - 106)  ABP: --  ABP(mean): --  RR: 25 (02 Aug 2018 09:07) (12 - 41)  SpO2: 100% (02 Aug 2018 09:07) (91% - 100%)          PHYSICAL EXAM:  General: no acute distress, now extubated, restless  on high flow NC  Eyes:  anicteric, no conjunctival injection, no discharge  Oropharynx: clear, no exudate  Neck: LIJ CVL  Lungs: clear to auscultation  Heart: regular rate and rhythm; no murmur, rubs or gallops  Abdomen: soft, nondistended, nontender, without mass or organomegaly  Blackman  Skin: R hip incision dressing intact; hemovac x 2  Extremities: no edema  Neurologic: awake, but restless, follows simple commands    LAB RESULTS:                                                         8.6    11.2  )-----------( 288      ( 02 Aug 2018 03:30 )             26.6   08-02    139  |  107  |  31<H>  ----------------------------<  176<H>  3.5   |  18<L>  |  0.72    Ca    7.5<L>      02 Aug 2018 03:30  Phos  2.7     08-02  Mg     2.0     08-02              MICROBIOLOGY:  RECENT CULTURES REVIEWED        RADIOLOGY REVIEWED

## 2018-08-02 NOTE — CHART NOTE - NSCHARTNOTEFT_GEN_A_CORE
SICU PA Note    Called to patient bedside for tachycardia and tachypnea, concern for aspiration.  Pt has been requiring HFNC and BiPap since extubation on Monday.  Pt admits to shortness of breath and is tachypneic and wheezing on exam.  Decision made for intubation in setting of possible aspiration and plan for RTOR in am.  Anesthesia called to bedside, intubated with size 7.5 ETT at 22cm at the lip.  Pt sedated with Propofol and Succinylcholine.  Tolerated procedure without complications.  Placed on PRVC 12/400/40/5, saturating 100%.  CXR and ABG ordered, will f/u.  Primary team made aware.   aware of above events.  Dr. Ni present for above.

## 2018-08-02 NOTE — PROGRESS NOTE ADULT - SUBJECTIVE AND OBJECTIVE BOX
[facility] DIVISION OF KIDNEY DISEASES AND HYPERTENSION -- FOLLOW UP NOTE  --------------------------------------------------------------------------------  Ada Altman  [id number]  --------------------------------------------------------------------------------  Chief Complaint: Hyponatremia  24 hour events/subjective:  Seen and examined at the bedside.   No new complaints        PAST HISTORY  --------------------------------------------------------------------------------  No significant changes to PMH, PSH, FHx, SHx, unless otherwise noted    ALLERGIES & MEDICATIONS  --------------------------------------------------------------------------------  Allergies    ASA; dye contrast (Anaphylaxis)  aspirin (Short breath)  divalproex sodium (Other (Unknown))  Haldol (Other (Unknown))  penicillin (Short breath; Rash)  sulfa drugs (Short breath; Rash)  Xanax (Other (Unknown))    Intolerances      Standing Inpatient Medications  buDESOnide   0.5 milliGRAM(s) Respule 0.5 milliGRAM(s) Inhalation every 12 hours  Ceftaroline Fosamil IVPB 600 milliGRAM(s) IV Intermittent every 12 hours  chlorhexidine 4% Liquid 1 Application(s) Topical <User Schedule>  DAPTOmycin IVPB      DAPTOmycin IVPB 500 milliGRAM(s) IV Intermittent every 24 hours  dexmedetomidine Infusion 0.2 MICROgram(s)/kG/Hr IV Continuous <Continuous>  docusate sodium 100 milliGRAM(s) Oral three times a day  famotidine    Tablet 20 milliGRAM(s) Oral two times a day  FIRST- Mouthwash  BLM 5 milliLiter(s) Swish and Spit daily  heparin  Injectable 5000 Unit(s) SubCutaneous every 8 hours  hydrocortisone sodium succinate Injectable   IV Push   hydrocortisone sodium succinate Injectable 25 milliGRAM(s) IV Push every 12 hours  insulin lispro (HumaLOG) corrective regimen sliding scale   SubCutaneous at bedtime  insulin lispro (HumaLOG) corrective regimen sliding scale   SubCutaneous three times a day before meals  lidocaine   Patch 1 Patch Transdermal every 24 hours  melatonin 3 milliGRAM(s) Oral <User Schedule>  mirtazapine 45 milliGRAM(s) Oral <User Schedule>  potassium chloride  20 mEq/100 mL IVPB 20 milliEquivalent(s) IV Intermittent every 2 hours  potassium phosphate IVPB 15 milliMole(s) IV Intermittent once  QUEtiapine 50 milliGRAM(s) Oral <User Schedule>  senna 2 Tablet(s) Oral at bedtime  sertraline 50 milliGRAM(s) Oral daily    PRN Inpatient Medications  acetaminophen   Tablet. 975 milliGRAM(s) Oral every 6 hours PRN  ALBUTerol/ipratropium for Nebulization 3 milliLiter(s) Nebulizer every 6 hours PRN  traMADol 50 milliGRAM(s) Oral every 6 hours PRN      REVIEW OF SYSTEMS  --------------------------------------------------------------------------------  Review Of Systems:  Constitutional:No Fever,  Chills,  Fatigue, Weight change   HEENT: No Blurred vision, Eye Pain, Headache, Runny nose, Sore Throat   Respiratory:No Cough, Wheezing, Shortness of breath  Cardiovascular: No Chest Pain, Palpitations, FARR, PND, Orthopnea  Gastrointestinal:No Nausea, Vomiting, Abdominal Pain,  Diarrhea, Constipation, Hemorrhoids  Genitourinary:No  Nocturia, Hematuria,  Dysuria, Incontinence, Foam in urine  Extremities:  No Swelling , Joint Pain  Neurologic:  No Focal deficit, Paresthesias, Syncope  Lymphatic:   No Lymphadenopathy   Skin: No Rash,  Ecchymoses , Wounds, Lesions  Psychiatry: Denies Depression,  Suicidal/Homicidal Ideation, Anxiety, Sleep Disturbances    All other systems were reviewed and are negative, except as noted.    VITALS/PHYSICAL EXAM  --------------------------------------------------------------------------------  T(C): 36.8 (18 @ 03:00), Max: 36.9 (18 @ 15:00)  HR: 75 (18 @ 06:00) (59 - 113)  BP: 147/69 (18 @ 06:00) (123/56 - 163/72)  RR: 33 (18 @ 06:00) (12 - 41)  SpO2: 100% (18 @ 06:00) (91% - 100%)  Wt(kg): --      Daily     Daily Weight in k.5 (02 Aug 2018 03:40)  I&O's Summary    2018 07:  -  01 Aug 2018 07:00  --------------------------------------------------------  IN: 1410.1 mL / OUT: 945 mL / NET: 465.1 mL    01 Aug 2018 07:01  -  02 Aug 2018 06:26  --------------------------------------------------------  IN: 531 mL / OUT: 865 mL / NET: -334 mL          07-31-18 @ 07:01  -  08-01-18 @ 07:00  --------------------------------------------------------  IN: 1410.1 mL / OUT: 945 mL / NET: 465.1 mL    18 @ 07:01  -  18 @ 06:26  --------------------------------------------------------  IN: 531 mL / OUT: 865 mL / NET: -334 mL        Physical Exam:  Gen: Sitting in couch and breathing with biPAP  HEENT: anicteric  Pulm: CTA B/L   CVS: RRR,  Normal S1 S2  Abd: soft, nontender, nondistended  Neuro: AAO x3, no asterixis   Back: No dependent edema  : No leslye  LE: Warm, no edema  Skin: Warm, without rashes  Vascular access: none    LABS/STUDIES  --------------------------------------------------------------------------------              8.6    11.2  >-----------<  288      [18 @ 03:30]              26.6     139  |  107  |  31  ----------------------------<  176      [18 @ 03:30]  3.5   |  18  |  0.72        Ca     7.5     [18 @ 03:30]      Mg     2.0     [18 @ 03:30]      Phos  2.7     [18 03:30]      PT/INR: PT 13.2 , INR 1.22       [18 03:30]  PTT: 27.7       [18 03:30]      Creatinine Trend:  SCr 0.72 [ 03:30]  SCr 0.74 [ 04:10]  SCr 0.79 [ 06:48]  SCr 0.76 [ 00:46]  SCr 0.77 [ 20:10]    Urinalysis - [18 10:00]      Color Yellow / Appearance Clear / SG 1.024 / pH 6.0      Gluc 150 / Ketone Negative  / Bili Negative / Urobili Negative       Blood Trace / Protein 30 / Leuk Est Negative / Nitrite Negative      RBC 3-5 / WBC 3-5 / Hyaline 2-5 / Gran  / Sq Epi  / Non Sq Epi OCC / Bacteria Few    Urine Creatinine 81      [18 @ 04:56]  Urine Sodium 23      [18 @ 10:03]  Urine Potassium 68      [18 10:03]  Urine Chloride 79      [18 10:03]  Urine Osmolality 598      [18 @ 11:29]    HbA1c 5.5      [18 09:02]  TSH 0.95      [18 09:02]      PIOTR: titer 1:320, pattern Centromere      [18 05:49]  dsDNA <12      [18 @ 14:00]  C3 Complement 82      [18 05:49]  C4 Complement 29      [18 05:49]  Free Light Chains: kappa 0.84, lambda 1.18, ratio = 0.71      [:49]  Immunofixation Serum:   No Monoclonal Band Identified      [18 05:49]      Radiology  --------------------------------------------------------------------------------    --------------------------------------------------------------------------------  Ada Altman  [id number]

## 2018-08-03 ENCOUNTER — RESULT REVIEW (OUTPATIENT)
Age: 68
End: 2018-08-03

## 2018-08-03 ENCOUNTER — APPOINTMENT (OUTPATIENT)
Dept: ORTHOPEDIC SURGERY | Facility: HOSPITAL | Age: 68
End: 2018-08-03
Payer: MEDICARE

## 2018-08-03 LAB
ANION GAP SERPL CALC-SCNC: 15 MMOL/L — SIGNIFICANT CHANGE UP (ref 5–17)
APTT BLD: 27.5 SEC — SIGNIFICANT CHANGE UP (ref 27.5–37.4)
BUN SERPL-MCNC: 22 MG/DL — SIGNIFICANT CHANGE UP (ref 7–23)
CA-I BLD-SCNC: 1.13 MMOL/L — SIGNIFICANT CHANGE UP (ref 1.12–1.3)
CALCIUM SERPL-MCNC: 7.2 MG/DL — LOW (ref 8.4–10.5)
CHLORIDE SERPL-SCNC: 109 MMOL/L — HIGH (ref 96–108)
CK SERPL-CCNC: 34 U/L — SIGNIFICANT CHANGE UP (ref 25–170)
CO2 SERPL-SCNC: 15 MMOL/L — LOW (ref 22–31)
CREAT SERPL-MCNC: 0.61 MG/DL — SIGNIFICANT CHANGE UP (ref 0.5–1.3)
CULTURE RESULTS: SIGNIFICANT CHANGE UP
GAS PNL BLDV: SIGNIFICANT CHANGE UP
GLUCOSE SERPL-MCNC: 246 MG/DL — HIGH (ref 70–99)
HCT VFR BLD CALC: 33.3 % — LOW (ref 34.5–45)
HGB BLD-MCNC: 11 G/DL — LOW (ref 11.5–15.5)
INR BLD: 1.15 RATIO — SIGNIFICANT CHANGE UP (ref 0.88–1.16)
MAGNESIUM SERPL-MCNC: 1.8 MG/DL — SIGNIFICANT CHANGE UP (ref 1.6–2.6)
MCHC RBC-ENTMCNC: 29.3 PG — SIGNIFICANT CHANGE UP (ref 27–34)
MCHC RBC-ENTMCNC: 33 GM/DL — SIGNIFICANT CHANGE UP (ref 32–36)
MCV RBC AUTO: 89 FL — SIGNIFICANT CHANGE UP (ref 80–100)
PHOSPHATE SERPL-MCNC: 2.2 MG/DL — LOW (ref 2.5–4.5)
PLATELET # BLD AUTO: 283 K/UL — SIGNIFICANT CHANGE UP (ref 150–400)
POTASSIUM SERPL-MCNC: 4 MMOL/L — SIGNIFICANT CHANGE UP (ref 3.5–5.3)
POTASSIUM SERPL-SCNC: 4 MMOL/L — SIGNIFICANT CHANGE UP (ref 3.5–5.3)
PROTHROM AB SERPL-ACNC: 12.5 SEC — SIGNIFICANT CHANGE UP (ref 9.8–12.7)
RBC # BLD: 3.74 M/UL — LOW (ref 3.8–5.2)
RBC # FLD: 16.5 % — HIGH (ref 10.3–14.5)
SODIUM SERPL-SCNC: 139 MMOL/L — SIGNIFICANT CHANGE UP (ref 135–145)
SPECIMEN SOURCE: SIGNIFICANT CHANGE UP
WBC # BLD: 13.8 K/UL — HIGH (ref 3.8–10.5)
WBC # FLD AUTO: 13.8 K/UL — HIGH (ref 3.8–10.5)

## 2018-08-03 PROCEDURE — 26990 DRAINAGE OF PELVIS LESION: CPT | Mod: 76,78,RT

## 2018-08-03 PROCEDURE — 88311 DECALCIFY TISSUE: CPT | Mod: 26

## 2018-08-03 PROCEDURE — 27091 REMOVAL OF HIP PROSTHESIS: CPT | Mod: 80,58

## 2018-08-03 PROCEDURE — 88300 SURGICAL PATH GROSS: CPT | Mod: 26

## 2018-08-03 PROCEDURE — 27507 TREATMENT OF THIGH FRACTURE: CPT | Mod: 78,RT

## 2018-08-03 PROCEDURE — 88304 TISSUE EXAM BY PATHOLOGIST: CPT | Mod: 26

## 2018-08-03 PROCEDURE — 27507 TREATMENT OF THIGH FRACTURE: CPT | Mod: 80,58

## 2018-08-03 PROCEDURE — 26990 DRAINAGE OF PELVIS LESION: CPT | Mod: 58

## 2018-08-03 PROCEDURE — 27091 REMOVAL OF HIP PROSTHESIS: CPT | Mod: 78,RT

## 2018-08-03 PROCEDURE — 71045 X-RAY EXAM CHEST 1 VIEW: CPT | Mod: 26

## 2018-08-03 PROCEDURE — 99291 CRITICAL CARE FIRST HOUR: CPT

## 2018-08-03 RX ORDER — DOCUSATE SODIUM 100 MG
100 CAPSULE ORAL THREE TIMES A DAY
Qty: 0 | Refills: 0 | Status: DISCONTINUED | OUTPATIENT
Start: 2018-08-03 | End: 2018-08-03

## 2018-08-03 RX ORDER — INSULIN LISPRO 100/ML
VIAL (ML) SUBCUTANEOUS EVERY 6 HOURS
Qty: 0 | Refills: 0 | Status: DISCONTINUED | OUTPATIENT
Start: 2018-08-03 | End: 2018-08-03

## 2018-08-03 RX ORDER — MAGNESIUM SULFATE 500 MG/ML
2 VIAL (ML) INJECTION ONCE
Qty: 0 | Refills: 0 | Status: COMPLETED | OUTPATIENT
Start: 2018-08-03 | End: 2018-08-03

## 2018-08-03 RX ORDER — CHLORHEXIDINE GLUCONATE 213 G/1000ML
15 SOLUTION TOPICAL
Qty: 0 | Refills: 0 | Status: DISCONTINUED | OUTPATIENT
Start: 2018-08-03 | End: 2018-08-03

## 2018-08-03 RX ADMIN — Medication 1 MILLIGRAM(S): at 14:39

## 2018-08-03 RX ADMIN — LIDOCAINE 1 PATCH: 4 CREAM TOPICAL at 10:41

## 2018-08-03 RX ADMIN — CHLORHEXIDINE GLUCONATE 15 MILLILITER(S): 213 SOLUTION TOPICAL at 05:03

## 2018-08-03 RX ADMIN — Medication 100 MILLIGRAM(S): at 13:26

## 2018-08-03 RX ADMIN — Medication 250 MILLIMOLE(S): at 04:53

## 2018-08-03 RX ADMIN — HYDROMORPHONE HYDROCHLORIDE 0.5 MILLIGRAM(S): 2 INJECTION INTRAMUSCULAR; INTRAVENOUS; SUBCUTANEOUS at 08:05

## 2018-08-03 RX ADMIN — Medication 3: at 10:42

## 2018-08-03 RX ADMIN — Medication 3 MILLILITER(S): at 11:14

## 2018-08-03 RX ADMIN — DIPHENHYDRAMINE HYDROCHLORIDE AND LIDOCAINE HYDROCHLORIDE AND ALUMINUM HYDROXIDE AND MAGNESIUM HYDRO 5 MILLILITER(S): KIT at 11:52

## 2018-08-03 RX ADMIN — Medication 2: at 05:18

## 2018-08-03 RX ADMIN — Medication 2: at 17:06

## 2018-08-03 RX ADMIN — DEXMEDETOMIDINE HYDROCHLORIDE IN 0.9% SODIUM CHLORIDE 0.5 MICROGRAM(S)/KG/HR: 4 INJECTION INTRAVENOUS at 04:53

## 2018-08-03 RX ADMIN — Medication 50 GRAM(S): at 04:53

## 2018-08-03 RX ADMIN — Medication 1 MILLIGRAM(S): at 11:31

## 2018-08-03 RX ADMIN — CEFTAROLINE FOSAMIL 50 MILLIGRAM(S): 600 POWDER, FOR SOLUTION INTRAVENOUS at 05:02

## 2018-08-03 RX ADMIN — FAMOTIDINE 20 MILLIGRAM(S): 10 INJECTION INTRAVENOUS at 17:06

## 2018-08-03 RX ADMIN — Medication 1 MILLIGRAM(S): at 05:05

## 2018-08-03 RX ADMIN — Medication 3 MILLILITER(S): at 05:10

## 2018-08-03 RX ADMIN — HYDROMORPHONE HYDROCHLORIDE 0.5 MILLIGRAM(S): 2 INJECTION INTRAMUSCULAR; INTRAVENOUS; SUBCUTANEOUS at 03:42

## 2018-08-03 RX ADMIN — HYDROMORPHONE HYDROCHLORIDE 0.5 MILLIGRAM(S): 2 INJECTION INTRAMUSCULAR; INTRAVENOUS; SUBCUTANEOUS at 11:45

## 2018-08-03 RX ADMIN — CHLORHEXIDINE GLUCONATE 15 MILLILITER(S): 213 SOLUTION TOPICAL at 13:22

## 2018-08-03 RX ADMIN — SERTRALINE 50 MILLIGRAM(S): 25 TABLET, FILM COATED ORAL at 13:22

## 2018-08-03 RX ADMIN — Medication 0.5 MILLIGRAM(S): at 05:10

## 2018-08-03 RX ADMIN — Medication 3 MILLILITER(S): at 01:04

## 2018-08-03 RX ADMIN — DEXMEDETOMIDINE HYDROCHLORIDE IN 0.9% SODIUM CHLORIDE 0.5 MICROGRAM(S)/KG/HR: 4 INJECTION INTRAVENOUS at 17:07

## 2018-08-03 RX ADMIN — HYDROMORPHONE HYDROCHLORIDE 0.5 MILLIGRAM(S): 2 INJECTION INTRAMUSCULAR; INTRAVENOUS; SUBCUTANEOUS at 11:30

## 2018-08-03 RX ADMIN — DEXMEDETOMIDINE HYDROCHLORIDE IN 0.9% SODIUM CHLORIDE 0.5 MICROGRAM(S)/KG/HR: 4 INJECTION INTRAVENOUS at 14:39

## 2018-08-03 RX ADMIN — CEFTAROLINE FOSAMIL 50 MILLIGRAM(S): 600 POWDER, FOR SOLUTION INTRAVENOUS at 17:05

## 2018-08-03 RX ADMIN — CHLORHEXIDINE GLUCONATE 1 APPLICATION(S): 213 SOLUTION TOPICAL at 05:18

## 2018-08-03 RX ADMIN — Medication 100 MILLIGRAM(S): at 05:01

## 2018-08-03 RX ADMIN — FAMOTIDINE 20 MILLIGRAM(S): 10 INJECTION INTRAVENOUS at 05:03

## 2018-08-03 RX ADMIN — DAPTOMYCIN 120 MILLIGRAM(S): 500 INJECTION, POWDER, LYOPHILIZED, FOR SOLUTION INTRAVENOUS at 14:26

## 2018-08-03 RX ADMIN — HYDROMORPHONE HYDROCHLORIDE 0.5 MILLIGRAM(S): 2 INJECTION INTRAMUSCULAR; INTRAVENOUS; SUBCUTANEOUS at 03:57

## 2018-08-03 RX ADMIN — Medication 250 MILLIMOLE(S): at 07:46

## 2018-08-03 RX ADMIN — HYDROMORPHONE HYDROCHLORIDE 0.5 MILLIGRAM(S): 2 INJECTION INTRAMUSCULAR; INTRAVENOUS; SUBCUTANEOUS at 07:50

## 2018-08-03 NOTE — PROGRESS NOTE ADULT - SUBJECTIVE AND OBJECTIVE BOX
Pt S/E at bedside. Patient intubated      Patient seen and examined. Pain controlled.    Physical exam  VS: see EMR  Gen: Intubated  Right LE: Dressing clean, dry, and intact. Unable to perform neurovascular exam. Capillary refill brisk. Compartments soft and compressible.

## 2018-08-03 NOTE — PROGRESS NOTE ADULT - ASSESSMENT
ASSESSMENT:  68 year old female presenting with symptomatic hypoglycemia and septic shock secondary to an infected right hip s/p right hip I&D with hemiarthroplasty revision.    PLAN:    Neuro: acute post-op pain, intubated, anxiety, depression, dementia  - Monitor mental status.  - Sedation with Precedex while intubated.  - Dilaudid PRN pain.  - Home Zoloft, Seroquel, and Remeron.    Resp: acute respiratory distress/failure, asthma  - Monitor pulse oximeter.  - Mechanical ventilation for acute respiratory distress/failure.  - Pulmicort and Duoneb for asthma.    CV: septic vs. cardiogenic shock (resolved)  - Monitor vital signs.    GI: no acute issues  - NPO. Will start tube feeds post-operatively.  - Protonix for GERD.    Renal: CKD stage 2, hyponatremia  - Monitor I&Os.  - Monitor electrolytes and replete as necessary.  - D5 1/2NS w/ 20 mEq KCl at 50 mL/hr while hypoglycemic and NPO.    Heme: no acute issues  - Lovenox for VTE prophylaxis. Currently on hold for OR.    ID: MRSA bacteremia, infected right hip s/p I&D  - Monitor WBC, temperature, and procalcitonin.  - Follow up OR and repeat blood cultures. Reculture until patient is no longer bacteremic.  - Daptomycin with ceftaroline for MRSA bacteremia.  - Will need MRI to rule out lumbar epidural abscess.    Endo: hypoglycemia (resolved), hyperglycemia, adrenal insufficiency  - Prednisone 10 mg daily for asthma.  - Monitor fingersticks q4hrs for hypoglycemia.    Misc: infected right hip s/p I&D  - NWB as patient will be having explantation of her hardware.    Disposition:  - Full code.  - Will remain in SICU for respiratory and hemodynamic monitoring.    Cynthia Wilkins PA-C    [id number]

## 2018-08-03 NOTE — PROGRESS NOTE ADULT - ASSESSMENT
68F s/p R hip I&D with poly exchange, L acetabulum fx    OR today - explant R hip prosthesis with antibiotic spacer placement  Cont care per SICU  Pain control  WBAT RLE  FFWB LLE  Appreciate ID recs  NPO except meds  Hold chemical anticoagulation  IV fluids while NPO  Will discuss with attending and advise if change

## 2018-08-03 NOTE — PROGRESS NOTE ADULT - SUBJECTIVE AND OBJECTIVE BOX
HISTORY:  68 year old female with a past medical history of asthma on steroids, CVA (no residual deficits), pulmonary HTN, HLD, GERD, ulcerative colitis, fatty pancreas, impaired glucose tolerance, anxiety, and depression who presented on 7/26/2018 with altered mental status, rigors, and urinary frequency. Of note, patient was recently hospitalized for a right femoral neck fracture secondary to osteoporosis s/p hemiarthroplasty on 6/22/2018. She had been taking Plavix for her history of CVA and Lovenox for VTE prophylaxis but she developed a hematoma at her surgical site so the Lovenox was stopped. Since then, patient has been progressively more agitated, confused, and weak. Additionally, patient has been complaining of new onset low back pain and left hip pain. In the ED, patient was hemodynamically stable but noted to be hypoglycemic to the 20s so she received 2 amps of D50. She was also given 2 L of crystalloid and started on meropenem & vancomycin for concern of sepsis. CT scan revealed a 17 cm collection adjacent to the right hip prosthesis, an acute L2 compression fracture, and new left acetabular fracture. Patient was admitted to medicine but on 7/27/2018, patient was noted to be more altered, rigorous, tachypneic, febrile to 101 F, and hypotensive w/ SBP in the 60s so an RRT was called. She was given 1 L of crystalloid and started on a norepinephrine gtt. Patient subsequently admitted to SICU for hemodynamic monitoring. An emergent right axillary arterial line and left IJ central venous catheter was placed.    24 HOUR EVENTS: HISTORY:  68 year old female with a past medical history of asthma on steroids, CVA (no residual deficits), pulmonary HTN, HLD, GERD, ulcerative colitis, fatty pancreas, impaired glucose tolerance, anxiety, and depression who presented on 7/26/2018 with altered mental status, rigors, and urinary frequency. Of note, patient was recently hospitalized for a right femoral neck fracture secondary to osteoporosis s/p hemiarthroplasty on 6/22/2018. She had been taking Plavix for her history of CVA and Lovenox for VTE prophylaxis but she developed a hematoma at her surgical site so the Lovenox was stopped. Since then, patient has been progressively more agitated, confused, and weak. Additionally, patient has been complaining of new onset low back pain and left hip pain. In the ED, patient was hemodynamically stable but noted to be hypoglycemic to the 20s so she received 2 amps of D50. She was also given 2 L of crystalloid and started on meropenem & vancomycin for concern of sepsis. CT scan revealed a 17 cm collection adjacent to the right hip prosthesis, an acute L2 compression fracture, and new left acetabular fracture. Patient was admitted to medicine but on 7/27/2018, patient was noted to be more altered, rigorous, tachypneic, febrile to 101 F, and hypotensive w/ SBP in the 60s so an RRT was called. She was given 1 L of crystalloid and started on a norepinephrine gtt. Patient subsequently admitted to SICU for hemodynamic monitoring. An emergent right axillary arterial line and left IJ central venous catheter was placed.    24 HOUR EVENTS:  - Reintubated as patient appeared very tachypneic with accessory muscle use.  - Required 1/2 an amp of D50 for glucose 68.  - Given 2 units of PRBCs for HCT 26.6 in anticipation of the OR today with orthopedics for explantation of hardware. Responded appropriately to 33.3. HISTORY:  68 year old female with a past medical history of asthma on steroids, CVA (no residual deficits), pulmonary HTN, HLD, GERD, ulcerative colitis, fatty pancreas, impaired glucose tolerance, anxiety, and depression who presented on 7/26/2018 with altered mental status, rigors, and urinary frequency. Of note, patient was recently hospitalized for a right femoral neck fracture secondary to osteoporosis s/p hemiarthroplasty on 6/22/2018. She had been taking Plavix for her history of CVA and Lovenox for VTE prophylaxis but she developed a hematoma at her surgical site so the Lovenox was stopped. Since then, patient has been progressively more agitated, confused, and weak. Additionally, patient has been complaining of new onset low back pain and left hip pain. In the ED, patient was hemodynamically stable but noted to be hypoglycemic to the 20s so she received 2 amps of D50. She was also given 2 L of crystalloid and started on meropenem & vancomycin for concern of sepsis. CT scan revealed a 17 cm collection adjacent to the right hip prosthesis, an acute L2 compression fracture, and new left acetabular fracture. Patient was admitted to medicine but on 7/27/2018, patient was noted to be more altered, rigorous, tachypneic, febrile to 101 F, and hypotensive w/ SBP in the 60s so an RRT was called. She was given 1 L of crystalloid and started on a norepinephrine gtt. Patient subsequently admitted to SICU for hemodynamic monitoring. An emergent right axillary arterial line and left IJ central venous catheter was placed.    24 HOUR EVENTS:  - Reintubated as patient appeared very tachypneic with accessory muscle use.  - Required 1/2 an amp of D50 for glucose 68.  - Given 2 units of PRBCs for HCT 26.6 in anticipation of the OR today with orthopedics for explantation of hardware. Responded appropriately to 33.3.    SUBJECTIVE/ROS:  [x] A ten-point review of systems was otherwise negative except as noted.  [ ] Due to altered mental status/intubation, subjective information were not able to be obtained from the patient. History was obtained, to the extent possible, from review of the chart and collateral sources of information.      NEURO  RASS:     GCS:     [facility] ICU:  Exam:   Meds: dexmedetomidine Infusion 0.04 MICROgram(s)/kG/Hr IV Continuous <Continuous>  HYDROmorphone  Injectable 0.5 milliGRAM(s) IV Push every 3 hours PRN Severe Pain (7 - 10)  mirtazapine 45 milliGRAM(s) Oral <User Schedule>  QUEtiapine 50 milliGRAM(s) Oral <User Schedule>  sertraline 50 milliGRAM(s) Oral daily    [x] Adequacy of sedation and pain control has been assessed and adjusted      RESPIRATORY  RR: 24 (08-03-18 @ 05:00) (11 - 42)  SpO2: 100% (08-03-18 @ 05:11) (82% - 100%)  Wt(kg): --  Exam:   Mechanical Ventilation: Mode: AC/ CMV (Assist Control/ Continuous Mandatory Ventilation), RR (machine): 12, RR (patient): 14, TV (machine): 400, FiO2: 30, PEEP: 5, ITime: 1, MAP: 7, PIP: 16  ABG - ( 02 Aug 2018 15:40 )  pH: 7.38  /  pCO2: 32    /  pO2: 139   / HCO3: 19    / Base Excess: -5.1  /  SaO2: 98      Lactate: x                [ ] Extubation Readiness Assessed  Meds: ALBUTerol/ipratropium for Nebulization 3 milliLiter(s) Nebulizer every 6 hours  buDESOnide   0.5 milliGRAM(s) Respule 0.5 milliGRAM(s) Inhalation every 12 hours        CARDIOVASCULAR  HR: 86 (08-03-18 @ 05:11) (64 - 138)  BP: 154/83 (08-03-18 @ 05:00) (106/57 - 198/119)  BP(mean): 112 (08-03-18 @ 05:00) (76 - 149)  ABP: --  ABP(mean): --  Wt(kg): --  CVP(cm H2O): --      Exam:  Cardiac Rhythm:  Perfusion     [ ]Adequate   [ ]Inadequate  Mentation   [ ]Normal       [ ]Reduced  Extremities  [ ]Warm         [ ]Cool  Volume Status [ ]Hypervolemic [ ]Euvolemic [ ]Hypovolemic  Meds:       GI/NUTRITION  Exam:  Diet:  Meds: docusate sodium Liquid 100 milliGRAM(s) Oral three times a day  famotidine Injectable 20 milliGRAM(s) IV Push two times a day  senna 2 Tablet(s) Oral at bedtime      GENITOURINARY  I&O's Detail    08-01 @ 07:01  -  08-02 @ 07:00  --------------------------------------------------------  IN:    dexmedetomidine Infusion: 381 mL    IV PiggyBack: 150 mL  Total IN: 531 mL    OUT:    Accordian: 220 mL    Accordian: 30 mL    Indwelling Catheter - Urethral: 635 mL  Total OUT: 885 mL    Total NET: -354 mL      08-02 @ 07:01  -  08-03 @ 05:59  --------------------------------------------------------  IN:    dexmedetomidine Infusion: 18.9 mL    dexmedetomidine Infusion: 249.3 mL    dextrose 5% + sodium chloride 0.45% with potassium chloride 20 mEq/L: 1075 mL    IV PiggyBack: 150 mL    Oral Fluid: 240 mL    Packed Red Blood Cells: 350 mL    Solution: 250 mL  Total IN: 2333.2 mL    OUT:    Accordian: 60 mL    Accordian: 475 mL    Indwelling Catheter - Urethral: 800 mL    Nasoenteral Tube: 100 mL  Total OUT: 1435 mL    Total NET: 898.2 mL          08-03    139  |  109<H>  |  22  ----------------------------<  246<H>  4.0   |  15<L>  |  0.61    Ca    7.2<L>      03 Aug 2018 03:13  Phos  2.2     08-03  Mg     1.8     08-03      [ ] Blackman catheter, indication:   Meds: dextrose 5% + sodium chloride 0.45% with potassium chloride 20 mEq/L 1000 milliLiter(s) IV Continuous <Continuous>  sodium phosphate IVPB 15 milliMole(s) IV Intermittent once        HEMATOLOGIC  Meds:   [x] VTE Prophylaxis                        11.0   13.8  )-----------( 283      ( 03 Aug 2018 03:13 )             33.3     PT/INR - ( 02 Aug 2018 03:30 )   PT: 13.2 sec;   INR: 1.22 ratio         PTT - ( 02 Aug 2018 03:30 )  PTT:27.7 sec  Transfusion     [ ] PRBC   [ ] Platelets   [ ] FFP   [ ] Cryoprecipitate      INFECTIOUS DISEASES  T(C): 37.9 (08-03-18 @ 04:00), Max: 37.9 (08-03-18 @ 04:00)  Wt(kg): --  WBC Count: 13.8 K/uL (08-03 @ 03:13)    Recent Cultures:  Specimen Source: .Blood Blood-Peripheral, 08-01 @ 08:47; Results   Growth in anaerobic bottle: Gram Positive Cocci in Clusters; Gram Stain:   Growth in anaerobic bottle: Gram Positive Cocci in Clusters; Organism: --  Specimen Source: .Blood Blood-Peripheral, 07-31 @ 08:35; Results   Growth in aerobic and anaerobic bottles: Staphylococcus aureus  See previous culture [id number]; Gram Stain:   Growth in anaerobic bottle: Gram Positive Cocci in Clusters  Growth in aerobic bottle: Gram Positive Cocci in Clusters; Organism: --  Specimen Source: .Blood Blood-Venous, 07-30 @ 05:44; Results   Growth in aerobic bottle: Methicillin resistant Staphylococcus aureus  Two aerobic bottles received only.; Gram Stain:   Growth in aerobic bottle: Gram Positive Cocci in Clusters  Growth in aerobic bottle: Gram Positive Cocci in Clusters; Organism: Methicillin resistant Staphylococcus aureus  Specimen Source: .Blood Blood-Venous, 07-29 @ 08:29; Results   Growth in aerobic and anaerobic bottles: Methicillin resistant  Staphylococcus aureus  See previous culture [id number]; Gram Stain:   Growth in aerobic bottle: Gram Positive Cocci in Clusters  Growth in anaerobic bottle: Gram Positive Cocci in Clusters; Organism: --  Specimen Source: .Blood Blood-Venous, 07-29 @ 06:52; Results   Growth in aerobic and anaerobic bottles: Methicillin resistant  Staphylococcus aureus  See previous culture [id number]; Gram Stain:   Growth in anaerobic bottle: Gram Positive Cocci in Clusters  Growth in aerobic bottle:  Gram Positive Cocci in Clusters; Organism: --  Specimen Source: .Blood Blood, 07-28 @ 05:27; Results   Growth in aerobic and anaerobic bottles: Methicillin resistant  Staphylococcus aureus  See previous culture [id number]; Gram Stain:   Growth in aerobic bottle: Gram Positive Cocci in Clusters  Growth in anaerobic bottle: Gram Positive Cocci in Clusters; Organism: --  Specimen Source: .Tissue Other, right hip fluid superficial, 07-27 @ 22:56; Results   Testing in progress; Gram Stain: --; Organism: --  Specimen Source: .Tissue 1 right hip fluid superficial, 07-27 @ 22:54; Results   Moderate Methicillin resistant Staphylococcus aureus; Gram Stain:   No polymorphonuclear cells seen per low power field  Rare Gram variable coccobacilli per oil power field; Organism: Methicillin resistant Staphylococcus aureus  Specimen Source: .Other Other, right hip fluid deep, 07-27 @ 22:34; Results   Testing in progress; Gram Stain: --; Organism: Methicillin resistant Staphylococcus aureus  Specimen Source: .Tissue Other, 2 right hip tissue, 07-27 @ 21:26; Results   Testing in progress; Gram Stain:   No polymorphonuclear cells seen per low power field  No organisms seen per oil power field; Organism: Methicillin resistant Staphylococcus aureus  Specimen Source: .Tissue Other, right hip soft tissue, 07-27 @ 21:22; Results   Few Methicillin resistant Staphylococcus aureus; Gram Stain:   No polymorphonuclear cells seen per low power field  No organisms seen per oil power field; Organism: Methicillin resistant Staphylococcus aureus  Specimen Source: .Blood Blood, 07-27 @ 08:24; Results   Growth in aerobic and anaerobic bottles: Methicillin resistant  Staphylococcus aureus  See previous culture [id number]; Gram Stain:   Growth in anaerobic bottle: Gram Positive Cocci in Clusters  Growth in aerobic bottle: Gram Positive Cocci in Clusters; Organism: --    Meds: Ceftaroline Fosamil IVPB 600 milliGRAM(s) IV Intermittent every 12 hours  DAPTOmycin IVPB      DAPTOmycin IVPB 500 milliGRAM(s) IV Intermittent every 24 hours        ENDOCRINE  Capillary Blood Glucose    Meds: insulin lispro (HumaLOG) corrective regimen sliding scale   SubCutaneous every 4 hours  predniSONE   Tablet 10 milliGRAM(s) Oral every 24 hours        ACCESS DEVICES:  [ ] Peripheral IV  [ ] Central Venous Line	[ ] R	[ ] L	[ ] IJ	[ ] Fem	[ ] SC	Placed:   [ ] Arterial Line		[ ] R	[ ] L	[ ] Fem	[ ] Rad	[ ] Ax	Placed:   [ ] PICC:					[ ] Mediport  [ ] Urinary Catheter, Date Placed:   [ ] Necessity of urinary, arterial, and venous catheters discussed    OTHER MEDICATIONS:  chlorhexidine 0.12% Liquid 15 milliLiter(s) Swish and Spit <User Schedule>  chlorhexidine 4% Liquid 1 Application(s) Topical <User Schedule>  FIRST- Mouthwash  BLM 5 milliLiter(s) Swish and Spit daily  lidocaine   Patch 1 Patch Transdermal every 24 hours      CODE STATUS:     IMAGING: HISTORY:  68 year old female with a past medical history of asthma on steroids, CVA (no residual deficits), pulmonary HTN, HLD, GERD, ulcerative colitis, fatty pancreas, impaired glucose tolerance, anxiety, and depression who presented on 7/26/2018 with altered mental status, rigors, and urinary frequency. Of note, patient was recently hospitalized for a right femoral neck fracture secondary to osteoporosis s/p hemiarthroplasty on 6/22/2018. She had been taking Plavix for her history of CVA and Lovenox for VTE prophylaxis but she developed a hematoma at her surgical site so the Lovenox was stopped. Since then, patient has been progressively more agitated, confused, and weak. Additionally, patient has been complaining of new onset low back pain and left hip pain. In the ED, patient was hemodynamically stable but noted to be hypoglycemic to the 20s so she received 2 amps of D50. She was also given 2 L of crystalloid and started on meropenem & vancomycin for concern of sepsis. CT scan revealed a 17 cm collection adjacent to the right hip prosthesis, an acute L2 compression fracture, and new left acetabular fracture. Patient was admitted to medicine but on 7/27/2018, patient was noted to be more altered, rigorous, tachypneic, febrile to 101 F, and hypotensive w/ SBP in the 60s so an RRT was called. She was given 1 L of crystalloid and started on a norepinephrine gtt. Patient subsequently admitted to SICU for hemodynamic monitoring. An emergent right axillary arterial line and left IJ central venous catheter was placed.    24 HOUR EVENTS:  - Reintubated as patient appeared very tachypneic with accessory muscle use.  - Required 1/2 an amp of D50 for glucose 68.  - Given 2 units of PRBCs for HCT 26.6 in anticipation of the OR today with orthopedics for explantation of hardware. Responded appropriately to 33.3.    SUBJECTIVE/ROS:  [x] A ten-point review of systems was otherwise negative except as noted.  [ ] Due to altered mental status/intubation, subjective information were not able to be obtained from the patient. History was obtained, to the extent possible, from review of the chart and collateral sources of information.    NEURO  Exam: sedated, intermittently agitated, does not follow commands, moving all four extremities  Meds:  - dexmedetomidine Infusion 0.04 MICROgram(s)/kG/Hr IV Continuous <Continuous>  - HYDROmorphone  Injectable 0.5 milliGRAM(s) IV Push every 3 hours PRN Severe Pain (7 - 10)  - mirtazapine 45 milliGRAM(s) Oral <User Schedule>  - QUEtiapine 50 milliGRAM(s) Oral <User Schedule>  - sertraline 50 milliGRAM(s) Oral daily  [x] Adequacy of sedation and pain control has been assessed and adjusted    RESPIRATORY  RR: 24 (08-03-18 @ 05:00) (11 - 42)  SpO2: 100% (08-03-18 @ 05:11) (82% - 100%)  Exam: clear to auscultation bilaterally  Mechanical Ventilation: Mode: AC/ CMV (Assist Control/ Continuous Mandatory Ventilation), RR (machine): 12, RR (patient): 14, TV (machine): 400, FiO2: 30, PEEP: 5, ITime: 1, MAP: 7, PIP: 16  [x] Extubation Readiness Assessed  Meds:  - ALBUTerol/ipratropium for Nebulization 3 milliLiter(s) Nebulizer every 6 hours  - buDESOnide   0.5 milliGRAM(s) Respule 0.5 milliGRAM(s) Inhalation every 12 hours    CARDIOVASCULAR  HR: 86 (08-03-18 @ 05:11) (64 - 138)  BP: 154/83 (08-03-18 @ 05:00) (106/57 - 198/119)  BP(mean): 112 (08-03-18 @ 05:00) (76 - 149)  Exam: regular rate and rhythm, S1S2  Cardiac Rhythm: sinus  Perfusion    [x]Adequate    [ ]Inadequate  Mentation   [ ]Normal       [x]Reduced  Extremities  [x]Warm         [ ]Cool  Volume Status [ ]Hypervolemic [x]Euvolemic [ ]Hypovolemic  Meds: none    GI/NUTRITION  Exam: soft, nontender, nondistended  Diet: NPO  Meds:  - docusate sodium Liquid 100 milliGRAM(s) Oral three times a day  - famotidine Injectable 20 milliGRAM(s) IV Push two times a day  - senna 2 Tablet(s) Oral at bedtime    GENITOURINARY      139  |  109<H>  |  22  ----------------------------<  246<H>  4.0   |  15<L>  |  0.61    Ca    7.2<L>      03 Aug 2018 03:13  Phos  2.2   Mg     1.8    [x] Blackman catheter, indication: urine output monitoring in the critically ill  Meds: dextrose 5% + sodium chloride 0.45% with potassium chloride 20 mEq/L infuse at 50 mL/hr    HEMATOLOGIC  Meds: enoxaparin Injectable 40 milliGRAM(s) SubCutaneous every 24 hours  [x] VTE Prophylaxis                        11.0   13.8  )-----------( 283      ( 03 Aug 2018 03:13 )             33.3     INFECTIOUS DISEASES  T(C): 37.9 (08-03-18 @ 04:00), Max: 37.9 (08-03-18 @ 04:00)  WBC Count: 13.8 K/uL (08-03 @ 03:13)  Recent Cultures:  Specimen Source: .Blood Blood-Peripheral, 08-01 @ 08:47; Results   Growth in anaerobic bottle: Gram Positive Cocci in Clusters; Gram Stain:   Growth in anaerobic bottle: Gram Positive Cocci in Clusters; Organism: --  Specimen Source: .Blood Blood-Peripheral, 07-31 @ 08:35; Results   Growth in aerobic and anaerobic bottles: Staphylococcus aureus  See previous culture [id number]; Gram Stain:   Growth in anaerobic bottle: Gram Positive Cocci in Clusters  Growth in aerobic bottle: Gram Positive Cocci in Clusters; Organism: --  Specimen Source: .Blood Blood-Venous, 07-30 @ 05:44; Results   Growth in aerobic bottle: Methicillin resistant Staphylococcus aureus  Two aerobic bottles received only.; Gram Stain:   Growth in aerobic bottle: Gram Positive Cocci in Clusters  Growth in aerobic bottle: Gram Positive Cocci in Clusters; Organism: Methicillin resistant Staphylococcus aureus  Specimen Source: .Blood Blood-Venous, 07-29 @ 08:29; Results   Growth in aerobic and anaerobic bottles: Methicillin resistant  Staphylococcus aureus  See previous culture [id number]; Gram Stain:   Growth in aerobic bottle: Gram Positive Cocci in Clusters  Growth in anaerobic bottle: Gram Positive Cocci in Clusters; Organism: --  Specimen Source: .Blood Blood-Venous, 07-29 @ 06:52; Results   Growth in aerobic and anaerobic bottles: Methicillin resistant  Staphylococcus aureus  See previous culture [id number]; Gram Stain:   Growth in anaerobic bottle: Gram Positive Cocci in Clusters  Growth in aerobic bottle:  Gram Positive Cocci in Clusters; Organism: --  Specimen Source: .Blood Blood, 07-28 @ 05:27; Results   Growth in aerobic and anaerobic bottles: Methicillin resistant  Staphylococcus aureus  See previous culture [id number]; Gram Stain:   Growth in aerobic bottle: Gram Positive Cocci in Clusters  Growth in anaerobic bottle: Gram Positive Cocci in Clusters; Organism: --  Specimen Source: .Tissue Other, right hip fluid superficial, 07-27 @ 22:56; Results   Testing in progress; Gram Stain: --; Organism: --  Specimen Source: .Tissue 1 right hip fluid superficial, 07-27 @ 22:54; Results   Moderate Methicillin resistant Staphylococcus aureus; Gram Stain:   No polymorphonuclear cells seen per low power field  Rare Gram variable coccobacilli per oil power field; Organism: Methicillin resistant Staphylococcus aureus  Specimen Source: .Other Other, right hip fluid deep, 07-27 @ 22:34; Results   Testing in progress; Gram Stain: --; Organism: Methicillin resistant Staphylococcus aureus  Specimen Source: .Tissue Other, 2 right hip tissue, 07-27 @ 21:26; Results   Testing in progress; Gram Stain:   No polymorphonuclear cells seen per low power field  No organisms seen per oil power field; Organism: Methicillin resistant Staphylococcus aureus  Specimen Source: .Tissue Other, right hip soft tissue, 07-27 @ 21:22; Results   Few Methicillin resistant Staphylococcus aureus; Gram Stain:   No polymorphonuclear cells seen per low power field  No organisms seen per oil power field; Organism: Methicillin resistant Staphylococcus aureus  Specimen Source: .Blood Blood, 07-27 @ 08:24; Results   Growth in aerobic and anaerobic bottles: Methicillin resistant  Staphylococcus aureus  See previous culture [id number]; Gram Stain:   Growth in anaerobic bottle: Gram Positive Cocci in Clusters  Growth in aerobic bottle: Gram Positive Cocci in Clusters; Organism: --  Meds:  - Ceftaroline Fosamil IVPB 600 milliGRAM(s) IV Intermittent every 12 hours  - DAPTOmycin IVPB 500 milliGRAM(s) IV Intermittent every 24 hours    ENDOCRINE  Capillary Blood Glucose:  - POCT Blood Glucose.: 240 mg/dL (03 Aug 2018 05:17)  - POCT Blood Glucose.: 130 mg/dL (03 Aug 2018 01:25)  - POCT Blood Glucose.: 124 mg/dL (02 Aug 2018 21:56)  - POCT Blood Glucose.: 250 mg/dL (02 Aug 2018 17:50)  - POCT Blood Glucose.: 81 mg/dL (02 Aug 2018 13:58)  - POCT Blood Glucose.: 68 mg/dL (02 Aug 2018 13:06)  - POCT Blood Glucose.: 154 mg/dL (02 Aug 2018 09:00)  Meds:  - insulin lispro (HumaLOG) corrective regimen sliding scale   SubCutaneous every 4 hours  - predniSONE   Tablet 10 milliGRAM(s) Oral every 24 hours    ACCESS DEVICES:  [x] Peripheral IV  [x] Central Venous Line	[ ] R	[x] L	[x] IJ	[ ] Fem	[ ] SC	Placed: 7/27/2018  [ ] Arterial Line		[ ] R	[ ] L	[ ] Fem	[ ] Rad	[ ] Ax	Placed: 7/27/2018  [ ] PICC:					[ ] Mediport  [x] Urinary Catheter, Date Placed: 7/26/2018  [x] Necessity of urinary, arterial, and venous catheters discussed    OTHER MEDICATIONS:  - chlorhexidine 0.12% Liquid 15 milliLiter(s) Swish and Spit <User Schedule>  - chlorhexidine 4% Liquid 1 Application(s) Topical <User Schedule>  - FIRST- Mouthwash  BLM 5 milliLiter(s) Swish and Spit daily  lidocaine   Patch 1 Patch Transdermal every 24 hours    CODE STATUS: Full code.    IMAGING:

## 2018-08-03 NOTE — PROGRESS NOTE ADULT - ASSESSMENT
69 yo female with dementia, history of UC, and s/p RT Hip hemiarthroplasty 6/22/18  postsurgical hematoma  admitted with low grade fever, rigors, increased confusion, found to have leukocytosis, 20% bands, ICU, pressor use   Bld Cxs 7/26-8/1 still positive all MRSA  s/p R hip I&D, lavage, poly exchange- pus encountered, all specimens S aureus  Afebrile, Creat stable  Now on Dapto and ceftaroline, but cultures remain positive  GAYATHRI: no evidence of valvular vegetation  reintubated again, low grade fevers    Plan:  cont combo Tx with Dapto and Ceftaroline for now  downgrade to Dapto/Rifampin might be an option once bacteremia controlled  f/u repeat blood cult, change line if possible, will d/w ICU team  source control remains most important issue, reviewed with family at bedside  Ortho input appreciated, awaiting OR today removal of hardware/spacer  Would change lines in OR as well in view of persistent bacteremia, eventual transition to PICC for long term abx once bacteremia controlled  d/w pt's family at bedside, ID issues reviewed

## 2018-08-03 NOTE — PROGRESS NOTE ADULT - ATTENDING COMMENTS
Agree with above. Plan for I+D, Explant and possible abx spacer placement. All RBAs discussed with family. All questions Answered. Informed consent obtained.

## 2018-08-03 NOTE — PROGRESS NOTE ADULT - SUBJECTIVE AND OBJECTIVE BOX
CC: f/u for high grade sustained MRSA bacteremia    Pt remains in ICU, low grade fever, events noted was reintubated last night    REVIEW OF SYSTEMS:  as above    Antimicrobials Day # 8  Ceftaroline Fosamil IVPB 600 milliGRAM(s) IV Intermittent every 12 hours  DAPTOmycin IVPB      DAPTOmycin IVPB 500 milliGRAM(s) IV Intermittent every 24 hours      Medications Reviewed    ]ICU Vital Signs Last 24 Hrs  T(C): 37.7 (03 Aug 2018 11:00), Max: 37.9 (03 Aug 2018 04:00)  T(F): 99.8 (03 Aug 2018 11:00), Max: 100.3 (03 Aug 2018 04:00)  HR: 90 (03 Aug 2018 13:00) (64 - 138)  BP: 125/60 (03 Aug 2018 13:00) (106/57 - 198/119)  BP(mean): 86 (03 Aug 2018 13:00) (76 - 149)  ABP: --  ABP(mean): --  RR: 14 (03 Aug 2018 13:00) (11 - 42)  SpO2: 100% (03 Aug 2018 13:00) (82% - 100%)          PHYSICAL EXAM:  General: no acute distress, intubated  Eyes:  anicteric, no conjunctival injection, no discharge  Oropharynx: clear, no exudate  Neck: LIJ CVL  Lungs: clear to auscultation  Heart: regular rate and rhythm; no murmur, rubs or gallops  Abdomen: soft, nondistended, nontender, without mass or organomegaly  Blackman  Skin: R hip incision dressing intact; hemovac x 2  Extremities: no edema  Neurologic: sedated    LAB RESULTS:                                                       11.0   13.8  )-----------( 283      ( 03 Aug 2018 03:13 )             33.3   08-03    139  |  109<H>  |  22  ----------------------------<  246<H>  4.0   |  15<L>  |  0.61    Ca    7.2<L>      03 Aug 2018 03:13  Phos  2.2     08-03  Mg     1.8     08-03                  MICROBIOLOGY:  RECENT CULTURES REVIEWED        RADIOLOGY REVIEWED

## 2018-08-03 NOTE — PROGRESS NOTE ADULT - ATTENDING COMMENTS
I saw and evaluated patient.  I agree with above note.    Intubated yesterday for acute respiratory failure secondary to recent surgery and deconditioning.  This morning doing well on volume control ventilation with decent gas exchange  the hemodynamics are good  received blood transfusion overnight for posthemorrhagic anemia with good response  plan for further surgery to remove infected hardware of hip  plan of care reviewed with patients   45 minutes of critical care management applied.

## 2018-08-04 DIAGNOSIS — R73.02 IMPAIRED GLUCOSE TOLERANCE (ORAL): ICD-10-CM

## 2018-08-04 LAB
ANION GAP SERPL CALC-SCNC: 13 MMOL/L — SIGNIFICANT CHANGE UP (ref 5–17)
APTT BLD: 28.8 SEC — SIGNIFICANT CHANGE UP (ref 27.5–37.4)
BUN SERPL-MCNC: 17 MG/DL — SIGNIFICANT CHANGE UP (ref 7–23)
CALCIUM SERPL-MCNC: 6.9 MG/DL — LOW (ref 8.4–10.5)
CHLORIDE SERPL-SCNC: 109 MMOL/L — HIGH (ref 96–108)
CO2 SERPL-SCNC: 17 MMOL/L — LOW (ref 22–31)
CREAT SERPL-MCNC: 0.62 MG/DL — SIGNIFICANT CHANGE UP (ref 0.5–1.3)
GAS PNL BLDA: SIGNIFICANT CHANGE UP
GAS PNL BLDA: SIGNIFICANT CHANGE UP
GLUCOSE SERPL-MCNC: 147 MG/DL — HIGH (ref 70–99)
GRAM STN FLD: SIGNIFICANT CHANGE UP
HCT VFR BLD CALC: 32.8 % — LOW (ref 34.5–45)
HGB BLD-MCNC: 10.6 G/DL — LOW (ref 11.5–15.5)
INR BLD: 1.33 RATIO — HIGH (ref 0.88–1.16)
MAGNESIUM SERPL-MCNC: 1.9 MG/DL — SIGNIFICANT CHANGE UP (ref 1.6–2.6)
MCHC RBC-ENTMCNC: 29.1 PG — SIGNIFICANT CHANGE UP (ref 27–34)
MCHC RBC-ENTMCNC: 32.1 GM/DL — SIGNIFICANT CHANGE UP (ref 32–36)
MCV RBC AUTO: 90.7 FL — SIGNIFICANT CHANGE UP (ref 80–100)
PHOSPHATE SERPL-MCNC: 2.9 MG/DL — SIGNIFICANT CHANGE UP (ref 2.5–4.5)
PLATELET # BLD AUTO: 440 K/UL — HIGH (ref 150–400)
POTASSIUM SERPL-MCNC: 4.5 MMOL/L — SIGNIFICANT CHANGE UP (ref 3.5–5.3)
POTASSIUM SERPL-SCNC: 4.5 MMOL/L — SIGNIFICANT CHANGE UP (ref 3.5–5.3)
PROTHROM AB SERPL-ACNC: 14.4 SEC — HIGH (ref 9.8–12.7)
RBC # BLD: 3.62 M/UL — LOW (ref 3.8–5.2)
RBC # FLD: 17.2 % — HIGH (ref 10.3–14.5)
SODIUM SERPL-SCNC: 139 MMOL/L — SIGNIFICANT CHANGE UP (ref 135–145)
SPECIMEN SOURCE: SIGNIFICANT CHANGE UP
SPECIMEN SOURCE: SIGNIFICANT CHANGE UP
WBC # BLD: 41.6 K/UL — CRITICAL HIGH (ref 3.8–10.5)
WBC # FLD AUTO: 41.6 K/UL — CRITICAL HIGH (ref 3.8–10.5)

## 2018-08-04 PROCEDURE — 73552 X-RAY EXAM OF FEMUR 2/>: CPT | Mod: 26,RT

## 2018-08-04 PROCEDURE — 99291 CRITICAL CARE FIRST HOUR: CPT

## 2018-08-04 PROCEDURE — 71045 X-RAY EXAM CHEST 1 VIEW: CPT | Mod: 26

## 2018-08-04 RX ORDER — INSULIN LISPRO 100/ML
VIAL (ML) SUBCUTANEOUS EVERY 4 HOURS
Qty: 0 | Refills: 0 | Status: DISCONTINUED | OUTPATIENT
Start: 2018-08-04 | End: 2018-08-05

## 2018-08-04 RX ORDER — CALCIUM GLUCONATE 100 MG/ML
2 VIAL (ML) INTRAVENOUS ONCE
Qty: 0 | Refills: 0 | Status: COMPLETED | OUTPATIENT
Start: 2018-08-04 | End: 2018-08-04

## 2018-08-04 RX ORDER — DEXMEDETOMIDINE HYDROCHLORIDE IN 0.9% SODIUM CHLORIDE 4 UG/ML
0.04 INJECTION INTRAVENOUS
Qty: 200 | Refills: 0 | Status: DISCONTINUED | OUTPATIENT
Start: 2018-08-04 | End: 2018-08-09

## 2018-08-04 RX ORDER — SODIUM CHLORIDE 9 MG/ML
1000 INJECTION, SOLUTION INTRAVENOUS
Qty: 0 | Refills: 0 | Status: DISCONTINUED | OUTPATIENT
Start: 2018-08-04 | End: 2018-08-04

## 2018-08-04 RX ORDER — CEFTAROLINE FOSAMIL 600 MG/20ML
600 POWDER, FOR SOLUTION INTRAVENOUS EVERY 12 HOURS
Qty: 0 | Refills: 0 | Status: DISCONTINUED | OUTPATIENT
Start: 2018-08-04 | End: 2018-08-08

## 2018-08-04 RX ORDER — DIPHENHYDRAMINE HYDROCHLORIDE AND LIDOCAINE HYDROCHLORIDE AND ALUMINUM HYDROXIDE AND MAGNESIUM HYDRO
5 KIT DAILY
Qty: 0 | Refills: 0 | Status: DISCONTINUED | OUTPATIENT
Start: 2018-08-04 | End: 2018-08-28

## 2018-08-04 RX ORDER — HYDROMORPHONE HYDROCHLORIDE 2 MG/ML
0.5 INJECTION INTRAMUSCULAR; INTRAVENOUS; SUBCUTANEOUS
Qty: 0 | Refills: 0 | Status: DISCONTINUED | OUTPATIENT
Start: 2018-08-04 | End: 2018-08-06

## 2018-08-04 RX ORDER — CHLORHEXIDINE GLUCONATE 213 G/1000ML
15 SOLUTION TOPICAL
Qty: 0 | Refills: 0 | Status: DISCONTINUED | OUTPATIENT
Start: 2018-08-04 | End: 2018-08-06

## 2018-08-04 RX ORDER — FAMOTIDINE 10 MG/ML
20 INJECTION INTRAVENOUS
Qty: 0 | Refills: 0 | Status: DISCONTINUED | OUTPATIENT
Start: 2018-08-04 | End: 2018-08-06

## 2018-08-04 RX ORDER — IPRATROPIUM/ALBUTEROL SULFATE 18-103MCG
3 AEROSOL WITH ADAPTER (GRAM) INHALATION EVERY 6 HOURS
Qty: 0 | Refills: 0 | Status: DISCONTINUED | OUTPATIENT
Start: 2018-08-04 | End: 2018-09-25

## 2018-08-04 RX ORDER — SENNA PLUS 8.6 MG/1
5 TABLET ORAL AT BEDTIME
Qty: 0 | Refills: 0 | Status: DISCONTINUED | OUTPATIENT
Start: 2018-08-04 | End: 2018-08-06

## 2018-08-04 RX ORDER — BUDESONIDE, MICRONIZED 100 %
0.5 POWDER (GRAM) MISCELLANEOUS EVERY 12 HOURS
Qty: 0 | Refills: 0 | Status: DISCONTINUED | OUTPATIENT
Start: 2018-08-04 | End: 2018-10-04

## 2018-08-04 RX ORDER — DAPTOMYCIN 500 MG/10ML
500 INJECTION, POWDER, LYOPHILIZED, FOR SOLUTION INTRAVENOUS EVERY 24 HOURS
Qty: 0 | Refills: 0 | Status: DISCONTINUED | OUTPATIENT
Start: 2018-08-04 | End: 2018-08-10

## 2018-08-04 RX ORDER — SODIUM CHLORIDE 9 MG/ML
1000 INJECTION, SOLUTION INTRAVENOUS
Qty: 0 | Refills: 0 | Status: DISCONTINUED | OUTPATIENT
Start: 2018-08-04 | End: 2018-08-05

## 2018-08-04 RX ORDER — PHENYLEPHRINE HYDROCHLORIDE 10 MG/ML
0.5 INJECTION INTRAVENOUS
Qty: 40 | Refills: 0 | Status: DISCONTINUED | OUTPATIENT
Start: 2018-08-04 | End: 2018-08-05

## 2018-08-04 RX ORDER — CHLORHEXIDINE GLUCONATE 213 G/1000ML
1 SOLUTION TOPICAL
Qty: 0 | Refills: 0 | Status: DISCONTINUED | OUTPATIENT
Start: 2018-08-04 | End: 2018-10-04

## 2018-08-04 RX ORDER — QUETIAPINE FUMARATE 200 MG/1
50 TABLET, FILM COATED ORAL
Qty: 0 | Refills: 0 | Status: DISCONTINUED | OUTPATIENT
Start: 2018-08-04 | End: 2018-08-06

## 2018-08-04 RX ORDER — HEPARIN SODIUM 5000 [USP'U]/ML
5000 INJECTION INTRAVENOUS; SUBCUTANEOUS EVERY 8 HOURS
Qty: 0 | Refills: 0 | Status: DISCONTINUED | OUTPATIENT
Start: 2018-08-04 | End: 2018-08-05

## 2018-08-04 RX ORDER — SODIUM CHLORIDE 9 MG/ML
1000 INJECTION, SOLUTION INTRAVENOUS ONCE
Qty: 0 | Refills: 0 | Status: COMPLETED | OUTPATIENT
Start: 2018-08-04 | End: 2018-08-04

## 2018-08-04 RX ORDER — LIDOCAINE 4 G/100G
1 CREAM TOPICAL EVERY 24 HOURS
Qty: 0 | Refills: 0 | Status: DISCONTINUED | OUTPATIENT
Start: 2018-08-04 | End: 2018-10-04

## 2018-08-04 RX ORDER — MAGNESIUM SULFATE 500 MG/ML
2 VIAL (ML) INJECTION ONCE
Qty: 0 | Refills: 0 | Status: COMPLETED | OUTPATIENT
Start: 2018-08-04 | End: 2018-08-04

## 2018-08-04 RX ORDER — DOCUSATE SODIUM 100 MG
100 CAPSULE ORAL THREE TIMES A DAY
Qty: 0 | Refills: 0 | Status: DISCONTINUED | OUTPATIENT
Start: 2018-08-04 | End: 2018-08-06

## 2018-08-04 RX ORDER — MIRTAZAPINE 45 MG/1
45 TABLET, ORALLY DISINTEGRATING ORAL
Qty: 0 | Refills: 0 | Status: DISCONTINUED | OUTPATIENT
Start: 2018-08-04 | End: 2018-08-06

## 2018-08-04 RX ORDER — SERTRALINE 25 MG/1
50 TABLET, FILM COATED ORAL DAILY
Qty: 0 | Refills: 0 | Status: DISCONTINUED | OUTPATIENT
Start: 2018-08-04 | End: 2018-08-06

## 2018-08-04 RX ORDER — INSULIN LISPRO 100/ML
VIAL (ML) SUBCUTANEOUS EVERY 6 HOURS
Qty: 0 | Refills: 0 | Status: DISCONTINUED | OUTPATIENT
Start: 2018-08-04 | End: 2018-08-04

## 2018-08-04 RX ADMIN — Medication 62.5 MILLIMOLE(S): at 06:09

## 2018-08-04 RX ADMIN — LIDOCAINE 1 PATCH: 4 CREAM TOPICAL at 22:25

## 2018-08-04 RX ADMIN — HYDROMORPHONE HYDROCHLORIDE 0.5 MILLIGRAM(S): 2 INJECTION INTRAMUSCULAR; INTRAVENOUS; SUBCUTANEOUS at 16:35

## 2018-08-04 RX ADMIN — Medication 3 MILLILITER(S): at 11:32

## 2018-08-04 RX ADMIN — HEPARIN SODIUM 5000 UNIT(S): 5000 INJECTION INTRAVENOUS; SUBCUTANEOUS at 05:19

## 2018-08-04 RX ADMIN — SERTRALINE 50 MILLIGRAM(S): 25 TABLET, FILM COATED ORAL at 14:31

## 2018-08-04 RX ADMIN — DEXMEDETOMIDINE HYDROCHLORIDE IN 0.9% SODIUM CHLORIDE 0.5 MICROGRAM(S)/KG/HR: 4 INJECTION INTRAVENOUS at 21:30

## 2018-08-04 RX ADMIN — DEXMEDETOMIDINE HYDROCHLORIDE IN 0.9% SODIUM CHLORIDE 0.5 MICROGRAM(S)/KG/HR: 4 INJECTION INTRAVENOUS at 03:00

## 2018-08-04 RX ADMIN — CHLORHEXIDINE GLUCONATE 15 MILLILITER(S): 213 SOLUTION TOPICAL at 05:19

## 2018-08-04 RX ADMIN — Medication 3 MILLILITER(S): at 05:33

## 2018-08-04 RX ADMIN — PHENYLEPHRINE HYDROCHLORIDE 9.45 MICROGRAM(S)/KG/MIN: 10 INJECTION INTRAVENOUS at 17:57

## 2018-08-04 RX ADMIN — HEPARIN SODIUM 5000 UNIT(S): 5000 INJECTION INTRAVENOUS; SUBCUTANEOUS at 21:24

## 2018-08-04 RX ADMIN — Medication 0.5 MILLIGRAM(S): at 05:33

## 2018-08-04 RX ADMIN — PHENYLEPHRINE HYDROCHLORIDE 9.45 MICROGRAM(S)/KG/MIN: 10 INJECTION INTRAVENOUS at 20:17

## 2018-08-04 RX ADMIN — Medication 100 MILLIGRAM(S): at 05:21

## 2018-08-04 RX ADMIN — FAMOTIDINE 20 MILLIGRAM(S): 10 INJECTION INTRAVENOUS at 17:56

## 2018-08-04 RX ADMIN — DIPHENHYDRAMINE HYDROCHLORIDE AND LIDOCAINE HYDROCHLORIDE AND ALUMINUM HYDROXIDE AND MAGNESIUM HYDRO 5 MILLILITER(S): KIT at 11:52

## 2018-08-04 RX ADMIN — QUETIAPINE FUMARATE 50 MILLIGRAM(S): 200 TABLET, FILM COATED ORAL at 22:12

## 2018-08-04 RX ADMIN — Medication 3 MILLILITER(S): at 23:42

## 2018-08-04 RX ADMIN — SODIUM CHLORIDE 4000 MILLILITER(S): 9 INJECTION, SOLUTION INTRAVENOUS at 06:14

## 2018-08-04 RX ADMIN — Medication 3 MILLILITER(S): at 17:04

## 2018-08-04 RX ADMIN — DAPTOMYCIN 120 MILLIGRAM(S): 500 INJECTION, POWDER, LYOPHILIZED, FOR SOLUTION INTRAVENOUS at 14:29

## 2018-08-04 RX ADMIN — SODIUM CHLORIDE 75 MILLILITER(S): 9 INJECTION, SOLUTION INTRAVENOUS at 08:44

## 2018-08-04 RX ADMIN — SENNA PLUS 5 MILLILITER(S): 8.6 TABLET ORAL at 21:23

## 2018-08-04 RX ADMIN — PHENYLEPHRINE HYDROCHLORIDE 9.45 MICROGRAM(S)/KG/MIN: 10 INJECTION INTRAVENOUS at 05:46

## 2018-08-04 RX ADMIN — SODIUM CHLORIDE 75 MILLILITER(S): 9 INJECTION, SOLUTION INTRAVENOUS at 21:24

## 2018-08-04 RX ADMIN — Medication 4: at 18:22

## 2018-08-04 RX ADMIN — CHLORHEXIDINE GLUCONATE 15 MILLILITER(S): 213 SOLUTION TOPICAL at 21:24

## 2018-08-04 RX ADMIN — Medication 50 GRAM(S): at 05:19

## 2018-08-04 RX ADMIN — Medication 2: at 05:46

## 2018-08-04 RX ADMIN — FAMOTIDINE 20 MILLIGRAM(S): 10 INJECTION INTRAVENOUS at 05:19

## 2018-08-04 RX ADMIN — Medication 2: at 22:45

## 2018-08-04 RX ADMIN — Medication 10 MILLIGRAM(S): at 05:21

## 2018-08-04 RX ADMIN — Medication 0.5 MILLIGRAM(S): at 17:04

## 2018-08-04 RX ADMIN — SODIUM CHLORIDE 75 MILLILITER(S): 9 INJECTION, SOLUTION INTRAVENOUS at 17:58

## 2018-08-04 RX ADMIN — MIRTAZAPINE 45 MILLIGRAM(S): 45 TABLET, ORALLY DISINTEGRATING ORAL at 22:12

## 2018-08-04 RX ADMIN — DEXMEDETOMIDINE HYDROCHLORIDE IN 0.9% SODIUM CHLORIDE 0.5 MICROGRAM(S)/KG/HR: 4 INJECTION INTRAVENOUS at 17:57

## 2018-08-04 RX ADMIN — Medication 100 MILLIGRAM(S): at 21:24

## 2018-08-04 RX ADMIN — Medication 200 GRAM(S): at 05:19

## 2018-08-04 RX ADMIN — HYDROMORPHONE HYDROCHLORIDE 0.5 MILLIGRAM(S): 2 INJECTION INTRAMUSCULAR; INTRAVENOUS; SUBCUTANEOUS at 16:20

## 2018-08-04 RX ADMIN — HEPARIN SODIUM 5000 UNIT(S): 5000 INJECTION INTRAVENOUS; SUBCUTANEOUS at 14:14

## 2018-08-04 RX ADMIN — CHLORHEXIDINE GLUCONATE 15 MILLILITER(S): 213 SOLUTION TOPICAL at 14:30

## 2018-08-04 RX ADMIN — SODIUM CHLORIDE 50 MILLILITER(S): 9 INJECTION, SOLUTION INTRAVENOUS at 03:20

## 2018-08-04 RX ADMIN — LIDOCAINE 1 PATCH: 4 CREAM TOPICAL at 10:49

## 2018-08-04 RX ADMIN — CEFTAROLINE FOSAMIL 50 MILLIGRAM(S): 600 POWDER, FOR SOLUTION INTRAVENOUS at 05:20

## 2018-08-04 RX ADMIN — CHLORHEXIDINE GLUCONATE 1 APPLICATION(S): 213 SOLUTION TOPICAL at 05:14

## 2018-08-04 RX ADMIN — CEFTAROLINE FOSAMIL 50 MILLIGRAM(S): 600 POWDER, FOR SOLUTION INTRAVENOUS at 20:05

## 2018-08-04 RX ADMIN — Medication 100 MILLIGRAM(S): at 14:14

## 2018-08-04 NOTE — PROGRESS NOTE ADULT - SUBJECTIVE AND OBJECTIVE BOX
---___---___---___---___---___---___ ---___---___---___---___---___---___---___---___---___---                    <<<  M E D I C A L   A T T E N D I N G    F O L L O W    U P   N O T E  >>>    still intubated  is  post operative . on precedex , pressors and daptomycin and pain control      ---___---___---___---___---___---      <<<  MEDICATIONS:  >>>    MEDICATIONS  (STANDING):  ALBUTerol/ipratropium for Nebulization 3 milliLiter(s) Nebulizer every 6 hours  buDESOnide   0.5 milliGRAM(s) Respule 0.5 milliGRAM(s) Inhalation every 12 hours  Ceftaroline Fosamil IVPB 600 milliGRAM(s) IV Intermittent every 12 hours  chlorhexidine 0.12% Liquid 15 milliLiter(s) Swish and Spit <User Schedule>  chlorhexidine 4% Liquid 1 Application(s) Topical <User Schedule>  DAPTOmycin IVPB 500 milliGRAM(s) IV Intermittent every 24 hours  dexmedetomidine Infusion 0.04 MICROgram(s)/kG/Hr (0.504 mL/Hr) IV Continuous <Continuous>  dextrose 5% + lactated ringers. 1000 milliLiter(s) (75 mL/Hr) IV Continuous <Continuous>  docusate sodium Liquid 100 milliGRAM(s) Enteral Tube three times a day  famotidine Injectable 20 milliGRAM(s) IV Push two times a day  FIRST- Mouthwash  BLM 5 milliLiter(s) Swish and Spit daily  heparin  Injectable 5000 Unit(s) SubCutaneous every 8 hours  insulin lispro (HumaLOG) corrective regimen sliding scale   SubCutaneous every 6 hours  lidocaine   Patch 1 Patch Transdermal every 24 hours  mirtazapine 45 milliGRAM(s) Oral <User Schedule>  phenylephrine    Infusion 0.5 MICROgram(s)/kG/Min (9.45 mL/Hr) IV Continuous <Continuous>  predniSONE   Tablet 10 milliGRAM(s) Oral every 24 hours  QUEtiapine 50 milliGRAM(s) Oral <User Schedule>  senna Syrup 5 milliLiter(s) Oral at bedtime  sertraline 50 milliGRAM(s) Oral daily      MEDICATIONS  (PRN):  HYDROmorphone  Injectable 0.5 milliGRAM(s) IV Push every 3 hours PRN Severe Pain (7 - 10)  LORazepam   Injectable 1 milliGRAM(s) IV Push every 4 hours PRN Agitation       ---___---___---___---___---___---     <<<REVIEW OF SYSTEM: >>>    unable to obtain patient sedated and intubated      ---___---___---___---___---___---          <<<  VITAL SIGNS: >>>    T(F): 99.8 (08-04-18 @ 16:00), Max: 99.8 (08-04-18 @ 12:00)  HR: 86 (08-04-18 @ 17:45) (76 - 110)  BP: 121/60 (08-04-18 @ 17:45) (71/51 - 150/73)  RR: 16 (08-04-18 @ 17:45) (14 - 27)  SpO2: 100% (08-04-18 @ 17:45) (95% - 100%)  Wt(kg): --  CAPILLARY BLOOD GLUCOSE      POCT Blood Glucose.: 92 mg/dL (04 Aug 2018 12:23)    I&O's Summary    03 Aug 2018 07:01  -  04 Aug 2018 07:00  --------------------------------------------------------  IN: 2329 mL / OUT: 1025 mL / NET: 1304 mL    04 Aug 2018 07:01  -  04 Aug 2018 18:10  --------------------------------------------------------  IN: 1145 mL / OUT: 670 mL / NET: 475 mL         ---___---___---___---___---___---                       PHYSICAL EXAM:    GEN: A&O X 0 , NAD , comfortable  HEENT: NCAT, PERRL, MMM, no scleral icterus, hearing intact  NECK: Supple, No JVD  CVS: S1S2 , regular , No M/R/G appreciated  PULM: CTA B/L,  no W/R/R appreciated intubated   ABD.: soft. non tender, non distended,  bowel sounds present  Extrem: intact pulses ,  edema noted  Derm: No rash or ecchymosis noted  sedated      ---___---___---___---___---___---     <<<  LAB AND IMAGING: >>>                          10.6   41.6  )-----------( 440      ( 04 Aug 2018 04:10 )             32.8               08-04    139  |  109<H>  |  17  ----------------------------<  147<H>  4.5   |  17<L>  |  0.62    Ca    6.9<L>      04 Aug 2018 04:10  Phos  2.9     08-04  Mg     1.9     08-04      PT/INR - ( 04 Aug 2018 04:10 )   PT: 14.4 sec;   INR: 1.33 ratio         PTT - ( 04 Aug 2018 04:10 )  PTT:28.8 sec         CARDIAC MARKERS ( 03 Aug 2018 03:13 )  x     / x     / 34 U/L / x     / x                ABG - ( 04 Aug 2018 09:37 )  pH, Arterial: 7.45  pH, Blood: x     /  pCO2: 27    /  pO2: 108   / HCO3: 19    / Base Excess: -4.2  /  SaO2: 99                      [All pertinent / recent available Imaging reports and other labs reviewed]     ---___---___---___---___---___---___ ---___---___---___---___---           <<<  A S S E S S M E N T   A N YE   P L A N :  >>>          -GI/DVT Prophylaxis.    --------------------------------------------       >>______________________<<      Deniz Bolden .         phone   [phone]

## 2018-08-04 NOTE — PROGRESS NOTE ADULT - SUBJECTIVE AND OBJECTIVE BOX
CC: f/u for high grade sustained MRSA bacteremia    Pt remains in ICU, low grade fever, remains intubated  s/p return to OR for removal of hardware/spacer    REVIEW OF SYSTEMS:  as above    Antimicrobials Day # 9  Ceftaroline Fosamil IVPB 600 milliGRAM(s) IV Intermittent every 12 hours  DAPTOmycin IVPB      DAPTOmycin IVPB 500 milliGRAM(s) IV Intermittent every 24 hours      Medications Reviewed    ICU Vital Signs Last 24 Hrs  T(C): 37.4 (04 Aug 2018 08:00), Max: 38 (03 Aug 2018 15:00)  T(F): 99.3 (04 Aug 2018 08:00), Max: 100.4 (03 Aug 2018 15:00)  HR: 80 (04 Aug 2018 10:00) (76 - 110)  BP: 71/51 (04 Aug 2018 03:30) (71/51 - 165/108)  BP(mean): 57 (04 Aug 2018 03:30) (57 - 125)  ABP: 100/42 (04 Aug 2018 10:00) (84/53 - 157/63)  ABP(mean): 62 (04 Aug 2018 10:00) (61 - 101)  RR: 15 (04 Aug 2018 10:00) (14 - 42)  SpO2: 100% (04 Aug 2018 10:00) (95% - 100%)            PHYSICAL EXAM:  General: no acute distress, intubated  Eyes:  anicteric, no conjunctival injection, no discharge  Oropharynx: clear, no exudate  Neck: LIJ CVL  Lungs: clear to auscultation  Heart: regular rate and rhythm; no murmur, rubs or gallops  Abdomen: soft, nondistended, nontender, without mass or organomegaly  Blackman  Skin: R hip incision dressing intact; hemovac x 2  Extremities: no edema  Neurologic: sedated    LAB RESULTS:                                                                  10.6   41.6  )-----------( 440      ( 04 Aug 2018 04:10 )             32.8   08-04    139  |  109<H>  |  17  ----------------------------<  147<H>  4.5   |  17<L>  |  0.62    Ca    6.9<L>      04 Aug 2018 04:10  Phos  2.9     08-04  Mg     1.9     08-04                      MICROBIOLOGY:  RECENT CULTURES REVIEWED        RADIOLOGY REVIEWED

## 2018-08-04 NOTE — BRIEF OPERATIVE NOTE - PRE-OP DX
Acute onset sepsis  07/27/2018    Nilesh Dawson
Prosthetic joint implant failure, sequela  08/04/2018    Active  Deniz Chambers

## 2018-08-04 NOTE — BRIEF OPERATIVE NOTE - POST-OP DX
Acute onset sepsis  07/27/2018  periprosthetic joint injection  Nilesh Dawson
Closed fracture of shaft of right femur, unspecified fracture morphology, initial encounter  08/04/2018    Active  Deniz Chambers  Prosthetic joint implant failure, sequela  08/04/2018    Active  Deniz Chambers

## 2018-08-04 NOTE — PROGRESS NOTE ADULT - SUBJECTIVE AND OBJECTIVE BOX
HISTORY:  68 year old female with a past medical history of asthma on steroids, CVA (no residual deficits), pulmonary HTN, HLD, GERD, ulcerative colitis, fatty pancreas, impaired glucose tolerance, anxiety, and depression who presented on 7/26/2018 with altered mental status, rigors, and urinary frequency. Of note, patient was recently hospitalized for a right femoral neck fracture secondary to osteoporosis s/p hemiarthroplasty on 6/22/2018. She had been taking Plavix for her history of CVA and Lovenox for VTE prophylaxis but she developed a hematoma at her surgical site so the Lovenox was stopped. Since then, patient has been progressively more agitated, confused, and weak. Additionally, patient has been complaining of new onset low back pain and left hip pain. In the ED, patient was hemodynamically stable but noted to be hypoglycemic to the 20s so she received 2 amps of D50. She was also given 2 L of crystalloid and started on meropenem & vancomycin for concern of sepsis. CT scan revealed a 17 cm collection adjacent to the right hip prosthesis, an acute L2 compression fracture, and new left acetabular fracture. Patient was admitted to medicine but on 7/27/2018, patient was noted to be more altered, rigorous, tachypneic, febrile to 101 F, and hypotensive w/ SBP in the 60s so an RRT was called. She was given 1 L of crystalloid and started on a norepinephrine gtt. Patient subsequently admitted to SICU for hemodynamic monitoring. An emergent right axillary arterial line and left IJ central venous catheter was placed.    24 HOUR EVENTS:  - HISTORY:  68 year old female with a past medical history of asthma on steroids, CVA (no residual deficits), pulmonary HTN, HLD, GERD, ulcerative colitis, fatty pancreas, impaired glucose tolerance, anxiety, and depression who presented on 7/26/2018 with altered mental status, rigors, and urinary frequency. Of note, patient was recently hospitalized for a right femoral neck fracture secondary to osteoporosis s/p hemiarthroplasty on 6/22/2018. She had been taking Plavix for her history of CVA and Lovenox for VTE prophylaxis but she developed a hematoma at her surgical site so the Lovenox was stopped. Since then, patient has been progressively more agitated, confused, and weak. Additionally, patient has been complaining of new onset low back pain and left hip pain. In the ED, patient was hemodynamically stable but noted to be hypoglycemic to the 20s so she received 2 amps of D50. She was also given 2 L of crystalloid and started on meropenem & vancomycin for concern of sepsis. CT scan revealed a 17 cm collection adjacent to the right hip prosthesis, an acute L2 compression fracture, and new left acetabular fracture. Patient was admitted to medicine but on 7/27/2018, patient was noted to be more altered, rigorous, tachypneic, febrile to 101 F, and hypotensive w/ SBP in the 60s so an RRT was called. She was given 1 L of crystalloid and started on a norepinephrine gtt. Patient subsequently admitted to SICU for hemodynamic monitoring. An emergent right axillary arterial line and left IJ central venous catheter was placed.    24 HOUR EVENTS:  - Taken to the OR for right hip I&D, explantation of right hip hardware, ORIF of an right femur fracture, and placement of antibiotic spacer. Case was   - ISS frequency decreased from q4hrs to q6hrs.  - HISTORY:  68 year old female with a past medical history of asthma on steroids, CVA (no residual deficits), pulmonary HTN, HLD, GERD, ulcerative colitis, fatty pancreas, impaired glucose tolerance, anxiety, and depression who presented on 7/26/2018 with altered mental status, rigors, and urinary frequency. Of note, patient was recently hospitalized for a right femoral neck fracture secondary to osteoporosis s/p hemiarthroplasty on 6/22/2018. She had been taking Plavix for her history of CVA and Lovenox for VTE prophylaxis but she developed a hematoma at her surgical site so the Lovenox was stopped. Since then, patient has been progressively more agitated, confused, and weak. Additionally, patient has been complaining of new onset low back pain and left hip pain. In the ED, patient was hemodynamically stable but noted to be hypoglycemic to the 20s so she received 2 amps of D50. She was also given 2 L of crystalloid and started on meropenem & vancomycin for concern of sepsis. CT scan revealed a 17 cm collection adjacent to the right hip prosthesis, an acute L2 compression fracture, and new left acetabular fracture. Patient was admitted to medicine but on 7/27/2018, patient was noted to be more altered, rigorous, tachypneic, febrile to 101 F, and hypotensive w/ SBP in the 60s so an RRT was called. She was given 1 L of crystalloid and started on a norepinephrine gtt. Patient subsequently admitted to SICU for hemodynamic monitoring. An emergent right axillary arterial line and left IJ central venous catheter was placed.    24 HOUR EVENTS:  - Taken to the OR for right hip I&D, explantation of right hip hardware, ORIF of an right femur fracture, and placement of antibiotic spacer. Case was 9.5 hours.  mL,  mL, and  mL. Patient did require vasopressors shortly after the OR case began. Patient returned to SICU intubated and sedated on phenylephrine for vasopressor support.  - ISS frequency decreased from q4hrs to q6hrs.  - HISTORY:  68 year old female with a past medical history of asthma on steroids, CVA (no residual deficits), pulmonary HTN, HLD, GERD, ulcerative colitis, fatty pancreas, impaired glucose tolerance, anxiety, and depression who presented on 7/26/2018 with altered mental status, rigors, and urinary frequency. Of note, patient was recently hospitalized for a right femoral neck fracture secondary to osteoporosis s/p hemiarthroplasty on 6/22/2018. She had been taking Plavix for her history of CVA and Lovenox for VTE prophylaxis but she developed a hematoma at her surgical site so the Lovenox was stopped. Since then, patient has been progressively more agitated, confused, and weak. Additionally, patient has been complaining of new onset low back pain and left hip pain. In the ED, patient was hemodynamically stable but noted to be hypoglycemic to the 20s so she received 2 amps of D50. She was also given 2 L of crystalloid and started on meropenem & vancomycin for concern of sepsis. CT scan revealed a 17 cm collection adjacent to the right hip prosthesis, an acute L2 compression fracture, and new left acetabular fracture. Patient was admitted to medicine but on 7/27/2018, patient was noted to be more altered, rigorous, tachypneic, febrile to 101 F, and hypotensive w/ SBP in the 60s so an RRT was called. She was given 1 L of crystalloid and started on a norepinephrine gtt. Patient subsequently admitted to SICU for hemodynamic monitoring. An emergent right axillary arterial line and left IJ central venous catheter was placed.    24 HOUR EVENTS:  - Taken to the OR for right hip I&D, explantation of right hip hardware, ORIF of an right femur fracture, and placement of antibiotic spacer. Case was 9.5 hours.  mL,  mL, and  mL. Patient did require vasopressors shortly after the OR case began. Patient returned to SICU intubated and sedated on phenylephrine for vasopressor support. Was given 1 L of crystalloid to wean off phenylephrine.  - ISS frequency decreased from q4hrs to q6hrs.  - Repeat blood cultures sent. HISTORY:  68 year old female with a past medical history of asthma on steroids, CVA (no residual deficits), pulmonary HTN, HLD, GERD, ulcerative colitis, fatty pancreas, impaired glucose tolerance, anxiety, and depression who presented on 7/26/2018 with altered mental status, rigors, and urinary frequency. Of note, patient was recently hospitalized for a right femoral neck fracture secondary to osteoporosis s/p hemiarthroplasty on 6/22/2018. She had been taking Plavix for her history of CVA and Lovenox for VTE prophylaxis but she developed a hematoma at her surgical site so the Lovenox was stopped. Since then, patient has been progressively more agitated, confused, and weak. Additionally, patient has been complaining of new onset low back pain and left hip pain. In the ED, patient was hemodynamically stable but noted to be hypoglycemic to the 20s so she received 2 amps of D50. She was also given 2 L of crystalloid and started on meropenem & vancomycin for concern of sepsis. CT scan revealed a 17 cm collection adjacent to the right hip prosthesis, an acute L2 compression fracture, and new left acetabular fracture. Patient was admitted to medicine but on 7/27/2018, patient was noted to be more altered, rigorous, tachypneic, febrile to 101 F, and hypotensive w/ SBP in the 60s so an RRT was called. She was given 1 L of crystalloid and started on a norepinephrine gtt. Patient subsequently admitted to SICU for hemodynamic monitoring. An emergent right axillary arterial line and left IJ central venous catheter was placed.    24 HOUR EVENTS:  - Taken to the OR for right hip I&D, explantation of right hip hardware, ORIF of an right femur fracture, and placement of antibiotic spacer. Case was 9.5 hours.  mL,  mL, and  mL. Patient did require vasopressors shortly after the OR case began. Patient returned to SICU intubated and sedated on phenylephrine for vasopressor support. Was given 1 L of crystalloid to wean off phenylephrine.  - ISS frequency decreased from q4hrs to q6hrs.  - Repeat blood cultures sent.    SUBJECTIVE/ROS:  [x] A ten-point review of systems was otherwise negative except as noted.  [ ] Due to altered mental status/intubation, subjective information were not able to be obtained from the patient. History was obtained, to the extent possible, from review of the chart and collateral sources of information.    NEURO  Exam: sedated, intermittently agitated, does not follow commands, moving all four extremities  Meds:  - dexmedetomidine Infusion 0.04 MICROgram(s)/kG/Hr IV Continuous <Continuous>  - HYDROmorphone  Injectable 0.5 milliGRAM(s) IV Push every 3 hours PRN Severe Pain (7 - 10)  - LORazepam   Injectable 1 milliGRAM(s) IV Push every 4 hours PRN Agitation  - mirtazapine 45 milliGRAM(s) Oral <User Schedule>  - QUEtiapine 50 milliGRAM(s) Oral <User Schedule>  - sertraline 50 milliGRAM(s) Oral daily  - lidocaine   Patch 1 Patch Transdermal every 24 hours  [x] Adequacy of sedation and pain control has been assessed and adjusted    RESPIRATORY  RR: 23 (08-04-18 @ 06:15) (12 - 42)  SpO2: 100% (08-04-18 @ 06:15) (95% - 100%)  Exam: clear to auscultation bilaterally  Mechanical Ventilation: Mode: AC/ CMV (Assist Control/ Continuous Mandatory Ventilation), RR (machine): 16, RR (patient): 18, TV (machine): 400, FiO2: 30, PEEP: 5, ITime: 0.9, MAP: 7, PIP: 13  ABG - ( 04 Aug 2018 03:58 )  pH: 7.31  /  pCO2: 40    /  pO2: 71    / HCO3: 19    / Base Excess: -5.9  /  SaO2: 93    /    Lactate: 1.8  [x] Extubation Readiness Assessed  Meds:  - ALBUTerol/ipratropium for Nebulization 3 milliLiter(s) Nebulizer every 6 hours  - buDESOnide   0.5 milliGRAM(s) Respule 0.5 milliGRAM(s) Inhalation every 12 hours    CARDIOVASCULAR  HR: 101 (08-04-18 @ 06:15) (76 - 110)  BP: 71/51 (08-04-18 @ 03:30) (71/51 - 165/108)  BP(mean): 57 (08-04-18 @ 03:30) (57 - 125)  ABP: 157/63 (08-04-18 @ 06:15) (84/53 - 157/63)  ABP(mean): 101 (08-04-18 @ 06:15) (64 - 101)  Exam: regular rate and rhythm, S1S2  Cardiac Rhythm: sinus  Perfusion    [x]Adequate    [ ]Inadequate  Mentation   [ ]Normal       [x]Reduced  Extremities  [x]Warm         [ ]Cool  Volume Status [ ]Hypervolemic [x]Euvolemic [ ]Hypovolemic  Meds: phenylephrine    Infusion 0.5 MICROgram(s)/kG/Min IV Continuous <Continuous>    GI/NUTRITION  Exam: soft, nontender, nondistended  Diet: NPO  Meds:  - docusate sodium Liquid 100 milliGRAM(s) Enteral Tube three times a day  - famotidine Injectable 20 milliGRAM(s) IV Push two times a day  - senna Syrup 5 milliLiter(s) Oral at bedtime    GENITOURINARY    139  |  109<H>  |  17  ----------------------------<  147<H>  4.5   |  17<L>  |  0.62    Ca    6.9<L>      04 Aug 2018 04:10  Phos  2.9  Mg     1.9    [x] Blackman catheter, indication: urine output monitoring in the critically ill  Meds: dextrose 5% + sodium chloride 0.9% infuse at 50 mL/hr    HEMATOLOGIC  Meds: heparin  Injectable 5000 Unit(s) SubCutaneous every 8 hours  [x] VTE Prophylaxis                        10.6   41.6  )-----------( 440      ( 04 Aug 2018 04:10 )             32.8     PT/INR - ( 04 Aug 2018 04:10 )   PT: 14.4 sec;   INR: 1.33 ratio    PTT - ( 04 Aug 2018 04:10 )  PTT:28.8 sec    INFECTIOUS DISEASES  T(C): 36.2 (08-04-18 @ 02:45), Max: 38 (08-03-18 @ 15:00)  WBC Count: 41.6 K/uL (08-04 @ 04:10)    Recent Cultures:  Specimen Source: .Blood Blood-Peripheral, 08-01 @ 08:47; Results   Growth in anaerobic bottle: Methicillin resistant Staphylococcus aureus  See previous culture [id number]; Gram Stain:   Growth in anaerobic bottle: Gram Positive Cocci in Clusters; Organism: --  Specimen Source: .Blood Blood-Peripheral, 07-31 @ 08:35; Results   Growth in aerobic and anaerobic bottles: Methicillin resistant  Staphylococcus aureus  See previous culture [id number]; Gram Stain:   Growth in anaerobic bottle: Gram Positive Cocci in Clusters  Growth in aerobic bottle: Gram Positive Cocci in Clusters; Organism: --  Specimen Source: .Blood Blood-Venous, 07-30 @ 05:44; Results   Growth in aerobic bottle: Methicillin resistant Staphylococcus aureus  Two aerobic bottles received only.; Gram Stain:   Growth in aerobic bottle: Gram Positive Cocci in Clusters  Growth in aerobic bottle: Gram Positive Cocci in Clusters; Organism: Methicillin resistant Staphylococcus aureus  Specimen Source: .Blood Blood-Venous, 07-29 @ 08:29; Results   Growth in aerobic and anaerobic bottles: Methicillin resistant  Staphylococcus aureus  See previous culture [id number]; Gram Stain:   Growth in aerobic bottle: Gram Positive Cocci in Clusters  Growth in anaerobic bottle: Gram Positive Cocci in Clusters; Organism: --  Specimen Source: .Blood Blood-Venous, 07-29 @ 06:52; Results   Growth in aerobic and anaerobic bottles: Methicillin resistant  Staphylococcus aureus  See previous culture [id number]; Gram Stain:   Growth in anaerobic bottle: Gram Positive Cocci in Clusters  Growth in aerobic bottle:  Gram Positive Cocci in Clusters; Organism: --    Meds:  - Ceftaroline Fosamil IVPB 600 milliGRAM(s) IV Intermittent every 12 hours  - DAPTOmycin IVPB 500 milliGRAM(s) IV Intermittent every 24 hours    ENDOCRINE  Capillary Blood Glucose:  - POCT Blood Glucose.: 178 mg/dL (04 Aug 2018 05:44)  - POCT Blood Glucose.: 189 mg/dL (03 Aug 2018 16:51)  - POCT Blood Glucose.: 277 mg/dL (03 Aug 2018 10:22)  Meds:  - insulin lispro (HumaLOG) corrective regimen sliding scale   SubCutaneous every 6 hours  - predniSONE   Tablet 10 milliGRAM(s) Oral every 24 hours    ACCESS DEVICES:  [x] Peripheral IV  [x] Central Venous Line	[ ] R	[x] L	[x] IJ	[ ] Fem	[ ] SC		Placed: 7/27/2018  [x] Arterial Line		[ ] R	[x] L	[ ] Fem	[ ] Rad	[x] Brachial	Placed: 7/27/2018  [ ] PICC:					[ ] Mediport  [x] Urinary Catheter, Date Placed: 7/26/2018  [x] Necessity of urinary, arterial, and venous catheters discussed    OTHER MEDICATIONS:  - chlorhexidine 0.12% Liquid 15 milliLiter(s) Swish and Spit <User Schedule>  - chlorhexidine 4% Liquid 1 Application(s) Topical <User Schedule>  - FIRST- Mouthwash  BLM 5 milliLiter(s) Swish and Spit daily    CODE STATUS: Full code.    IMAGING: HISTORY:  68 year old female with a past medical history of asthma on steroids, CVA (no residual deficits), pulmonary HTN, HLD, GERD, ulcerative colitis, fatty pancreas, impaired glucose tolerance, anxiety, and depression who presented on 7/26/2018 with altered mental status, rigors, and urinary frequency. Of note, patient was recently hospitalized for a right femoral neck fracture secondary to osteoporosis s/p hemiarthroplasty on 6/22/2018. She had been taking Plavix for her history of CVA and Lovenox for VTE prophylaxis but she developed a hematoma at her surgical site so the Lovenox was stopped. Since then, patient has been progressively more agitated, confused, and weak. Additionally, patient has been complaining of new onset low back pain and left hip pain. In the ED, patient was hemodynamically stable but noted to be hypoglycemic to the 20s so she received 2 amps of D50. She was also given 2 L of crystalloid and started on meropenem & vancomycin for concern of sepsis. CT scan revealed a 17 cm collection adjacent to the right hip prosthesis, an acute L2 compression fracture, and new left acetabular fracture. Patient was admitted to medicine but on 7/27/2018, patient was noted to be more altered, rigorous, tachypneic, febrile to 101 F, and hypotensive w/ SBP in the 60s so an RRT was called. She was given 1 L of crystalloid and started on a norepinephrine gtt. Patient subsequently admitted to SICU for hemodynamic monitoring. An emergent right axillary arterial line and left IJ central venous catheter was placed.    24 HOUR EVENTS:  - Taken to the OR for right hip I&D, explantation of right hip hardware, ORIF of an right femur fracture, and placement of antibiotic spacer. Case was 9.5 hours.  mL,  mL, and  mL. Patient did require vasopressors shortly after the OR case began. Patient returned to SICU intubated and sedated on phenylephrine for vasopressor support. Was given 1 L of crystalloid to wean off phenylephrine.  - ISS frequency decreased from q4hrs to q6hrs.  - Repeat blood cultures sent.    SUBJECTIVE/ROS:  [ ] A ten-point review of systems was otherwise negative except as noted.  [x] Due to altered mental status/intubation, subjective information were not able to be obtained from the patient. History was obtained, to the extent possible, from review of the chart and collateral sources of information.    NEURO  Exam: sedated, intermittently agitated, does not follow commands, moving all four extremities  Meds:  - dexmedetomidine Infusion 0.04 MICROgram(s)/kG/Hr IV Continuous <Continuous>  - HYDROmorphone  Injectable 0.5 milliGRAM(s) IV Push every 3 hours PRN Severe Pain (7 - 10)  - LORazepam   Injectable 1 milliGRAM(s) IV Push every 4 hours PRN Agitation  - mirtazapine 45 milliGRAM(s) Oral <User Schedule>  - QUEtiapine 50 milliGRAM(s) Oral <User Schedule>  - sertraline 50 milliGRAM(s) Oral daily  - lidocaine   Patch 1 Patch Transdermal every 24 hours  [x] Adequacy of sedation and pain control has been assessed and adjusted    RESPIRATORY  RR: 23 (08-04-18 @ 06:15) (12 - 42)  SpO2: 100% (08-04-18 @ 06:15) (95% - 100%)  Exam: clear to auscultation bilaterally  Mechanical Ventilation: Mode: AC/ CMV (Assist Control/ Continuous Mandatory Ventilation), RR (machine): 16, RR (patient): 18, TV (machine): 400, FiO2: 30, PEEP: 5, ITime: 0.9, MAP: 7, PIP: 13  ABG - ( 04 Aug 2018 03:58 )  pH: 7.31  /  pCO2: 40    /  pO2: 71    / HCO3: 19    / Base Excess: -5.9  /  SaO2: 93    /    Lactate: 1.8  [x] Extubation Readiness Assessed  Meds:  - ALBUTerol/ipratropium for Nebulization 3 milliLiter(s) Nebulizer every 6 hours  - buDESOnide   0.5 milliGRAM(s) Respule 0.5 milliGRAM(s) Inhalation every 12 hours    CARDIOVASCULAR  HR: 101 (08-04-18 @ 06:15) (76 - 110)  BP: 71/51 (08-04-18 @ 03:30) (71/51 - 165/108)  BP(mean): 57 (08-04-18 @ 03:30) (57 - 125)  ABP: 157/63 (08-04-18 @ 06:15) (84/53 - 157/63)  ABP(mean): 101 (08-04-18 @ 06:15) (64 - 101)  Exam: regular rate and rhythm, S1S2  Cardiac Rhythm: sinus  Perfusion    [x]Adequate    [ ]Inadequate  Mentation   [ ]Normal       [x]Reduced  Extremities  [x]Warm         [ ]Cool  Volume Status [ ]Hypervolemic [x]Euvolemic [ ]Hypovolemic  Meds: phenylephrine    Infusion 0.5 MICROgram(s)/kG/Min IV Continuous <Continuous>    GI/NUTRITION  Exam: soft, nontender, nondistended  Diet: NPO  Meds:  - docusate sodium Liquid 100 milliGRAM(s) Enteral Tube three times a day  - famotidine Injectable 20 milliGRAM(s) IV Push two times a day  - senna Syrup 5 milliLiter(s) Oral at bedtime    GENITOURINARY    139  |  109<H>  |  17  ----------------------------<  147<H>  4.5   |  17<L>  |  0.62    Ca    6.9<L>      04 Aug 2018 04:10  Phos  2.9  Mg     1.9    [x] Blackman catheter, indication: urine output monitoring in the critically ill  Meds: dextrose 5% + sodium chloride 0.9% infuse at 50 mL/hr    HEMATOLOGIC  Meds: heparin  Injectable 5000 Unit(s) SubCutaneous every 8 hours  [x] VTE Prophylaxis                        10.6   41.6  )-----------( 440      ( 04 Aug 2018 04:10 )             32.8     PT/INR - ( 04 Aug 2018 04:10 )   PT: 14.4 sec;   INR: 1.33 ratio    PTT - ( 04 Aug 2018 04:10 )  PTT:28.8 sec    INFECTIOUS DISEASES  T(C): 36.2 (08-04-18 @ 02:45), Max: 38 (08-03-18 @ 15:00)  WBC Count: 41.6 K/uL (08-04 @ 04:10)    Recent Cultures:  Specimen Source: .Blood Blood-Peripheral, 08-01 @ 08:47; Results   Growth in anaerobic bottle: Methicillin resistant Staphylococcus aureus  See previous culture [id number]; Gram Stain:   Growth in anaerobic bottle: Gram Positive Cocci in Clusters; Organism: --  Specimen Source: .Blood Blood-Peripheral, 07-31 @ 08:35; Results   Growth in aerobic and anaerobic bottles: Methicillin resistant  Staphylococcus aureus  See previous culture [id number]; Gram Stain:   Growth in anaerobic bottle: Gram Positive Cocci in Clusters  Growth in aerobic bottle: Gram Positive Cocci in Clusters; Organism: --  Specimen Source: .Blood Blood-Venous, 07-30 @ 05:44; Results   Growth in aerobic bottle: Methicillin resistant Staphylococcus aureus  Two aerobic bottles received only.; Gram Stain:   Growth in aerobic bottle: Gram Positive Cocci in Clusters  Growth in aerobic bottle: Gram Positive Cocci in Clusters; Organism: Methicillin resistant Staphylococcus aureus  Specimen Source: .Blood Blood-Venous, 07-29 @ 08:29; Results   Growth in aerobic and anaerobic bottles: Methicillin resistant  Staphylococcus aureus  See previous culture [id number]; Gram Stain:   Growth in aerobic bottle: Gram Positive Cocci in Clusters  Growth in anaerobic bottle: Gram Positive Cocci in Clusters; Organism: --  Specimen Source: .Blood Blood-Venous, 07-29 @ 06:52; Results   Growth in aerobic and anaerobic bottles: Methicillin resistant  Staphylococcus aureus  See previous culture [id number]; Gram Stain:   Growth in anaerobic bottle: Gram Positive Cocci in Clusters  Growth in aerobic bottle:  Gram Positive Cocci in Clusters; Organism: --    Meds:  - Ceftaroline Fosamil IVPB 600 milliGRAM(s) IV Intermittent every 12 hours  - DAPTOmycin IVPB 500 milliGRAM(s) IV Intermittent every 24 hours    ENDOCRINE  Capillary Blood Glucose:  - POCT Blood Glucose.: 178 mg/dL (04 Aug 2018 05:44)  - POCT Blood Glucose.: 189 mg/dL (03 Aug 2018 16:51)  - POCT Blood Glucose.: 277 mg/dL (03 Aug 2018 10:22)  Meds:  - insulin lispro (HumaLOG) corrective regimen sliding scale   SubCutaneous every 6 hours  - predniSONE   Tablet 10 milliGRAM(s) Oral every 24 hours    ACCESS DEVICES:  [x] Peripheral IV  [x] Central Venous Line	[ ] R	[x] L	[x] IJ	[ ] Fem	[ ] SC		Placed: 7/27/2018  [x] Arterial Line		[ ] R	[x] L	[ ] Fem	[ ] Rad	[x] Brachial	Placed: 7/27/2018  [ ] PICC:					[ ] Mediport  [x] Urinary Catheter, Date Placed: 7/26/2018  [x] Necessity of urinary, arterial, and venous catheters discussed    OTHER MEDICATIONS:  - chlorhexidine 0.12% Liquid 15 milliLiter(s) Swish and Spit <User Schedule>  - chlorhexidine 4% Liquid 1 Application(s) Topical <User Schedule>  - FIRST- Mouthwash  BLM 5 milliLiter(s) Swish and Spit daily    CODE STATUS: Full code.    IMAGING:

## 2018-08-04 NOTE — PROGRESS NOTE ADULT - ASSESSMENT
69 yo female with dementia, history of UC, and s/p RT Hip hemiarthroplasty 6/22/18  postsurgical hematoma  admitted with low grade fever, rigors, increased confusion, found to have leukocytosis, 20% bands, ICU, pressor use   Bld Cxs 7/26-8/1 still positive all MRSA  s/p R hip I&D, lavage, poly exchange- pus encountered, all specimens S aureus  Afebrile, Creat stable  Now on Dapto and ceftaroline, but cultures remain positive  GAYATHRI: no evidence of valvular vegetation  reintubated again, low grade fevers  s/p return to OR, removal of hardware/spacer  leukocytosis likely acute leukemoid reaction, stress leukocytosis  Plan:  cont combo Tx with Dapto and Ceftaroline for now  downgrade to Dapto/Rifampin might be an option once bacteremia controlled  f/u repeat blood cult to ensure clearance of bacteremia  reviewed with family at bedside  f/u OR cultures

## 2018-08-04 NOTE — PROGRESS NOTE ADULT - ASSESSMENT
ASSESSMENT:  68 year old female presenting with symptomatic hypoglycemia and septic shock secondary to an infected right hip s/p right hip I&D with hemiarthroplasty revision.    PLAN:    Neuro: acute post-op pain, intubated, anxiety, depression, dementia  - Monitor mental status.  - Sedation with Precedex while intubated. Ativan PRN breakthrough agitation.  - Dilaudid PRN pain.  - Home Zoloft, Seroquel, and Remeron.    Resp: acute respiratory distress/failure, asthma  - Monitor pulse oximeter.  - Mechanical ventilation for acute respiratory distress/failure.  - Pulmicort and Duoneb for asthma.    CV: post-op hypotension  - Monitor vital signs.  - Wean phenylephrine gtt as tolerated for goal MAP > 65.    GI: no acute issues  - NPO. Will start tube feeds if unable to extubate.  - Protonix for GERD.    Renal: CKD stage 2, hyponatremia  - Monitor I&Os.  - Monitor electrolytes and replete as necessary.  - D5NS 50 mL/hr while hypoglycemic, NPO, and actively resuscitated.    Heme: no acute issues  - Lovenox for VTE prophylaxis.    ID: MRSA bacteremia, infected right hip s/p I&D  - Monitor WBC, temperature, and procalcitonin.  - Follow up OR and repeat blood cultures. Reculture until patient is no longer bacteremic.  - Daptomycin with ceftaroline for MRSA bacteremia.  - Will need MRI to rule out lumbar epidural abscess.    Endo: hypoglycemia (resolved), hyperglycemia, adrenal insufficiency  - Prednisone 10 mg daily for asthma.  - Monitor fingersticks q6hrs for hypoglycemia.    Disposition:  - Full code.  - Will remain in SICU for respiratory and hemodynamic monitoring.    Cynthia Wilkins PA-C    [id number]

## 2018-08-04 NOTE — PROGRESS NOTE ADULT - ATTENDING COMMENTS
I saw and evaluated patient and agree with above note  the patient is now post operative removal of hardware from hip  she is sedated on Precedex with good effect  post operative respiratory failure after surgery with good gas exchange.  weak and deconditioned unable to tolerate breathing trial will maintain mechanical ventilation  post hemorrhagic monitoring received one unit PRBC intraoperative  hypotensive on Phenylephrine although at low dose.  will continue to monitor need for additional fluid resuscitation.    Overall seems to have tolerated surgery.  Need to wean off pressors and then assess feasibility of successful extubation  plan of care reviewed with the patients daughter  45 minutes of critical care management applied

## 2018-08-04 NOTE — PROVIDER CONTACT NOTE (OTHER) - ASSESSMENT
Pt intubated full support. Sedated on precidex. Follows commands. NSR on yue for BP support. BG checked q6 hours. BG at 1811 was 219. BG at 2220 216. Rechecked at 2222 177. As per SICU team cover .

## 2018-08-04 NOTE — BRIEF OPERATIVE NOTE - PROCEDURE
<<-----Click on this checkbox to enter Procedure Bone surgery  08/04/2018  Removal of R hip hardware  Placement of antibiotic spacer  ORIF R femur  Active  [id number]

## 2018-08-04 NOTE — PROGRESS NOTE ADULT - ASSESSMENT
leukocytosis     sepsis     pelvic fracture     patient currently on pressors   and intubated and has gt tube in place   on daptomycin as well   currently still in sicu

## 2018-08-05 LAB
ANION GAP SERPL CALC-SCNC: 9 MMOL/L — SIGNIFICANT CHANGE UP (ref 5–17)
BLD GP AB SCN SERPL QL: NEGATIVE — SIGNIFICANT CHANGE UP
BUN SERPL-MCNC: 19 MG/DL — SIGNIFICANT CHANGE UP (ref 7–23)
CALCIUM SERPL-MCNC: 6.5 MG/DL — CRITICAL LOW (ref 8.4–10.5)
CHLORIDE SERPL-SCNC: 104 MMOL/L — SIGNIFICANT CHANGE UP (ref 96–108)
CO2 SERPL-SCNC: 19 MMOL/L — LOW (ref 22–31)
CREAT SERPL-MCNC: 0.81 MG/DL — SIGNIFICANT CHANGE UP (ref 0.5–1.3)
CULTURE RESULTS: SIGNIFICANT CHANGE UP
GAS PNL BLDA: SIGNIFICANT CHANGE UP
GLUCOSE SERPL-MCNC: 288 MG/DL — HIGH (ref 70–99)
HCT VFR BLD CALC: 20.8 % — CRITICAL LOW (ref 34.5–45)
HCT VFR BLD CALC: 21.9 % — LOW (ref 34.5–45)
HCT VFR BLD CALC: 24.9 % — LOW (ref 34.5–45)
HGB BLD-MCNC: 6.9 G/DL — CRITICAL LOW (ref 11.5–15.5)
HGB BLD-MCNC: 7 G/DL — CRITICAL LOW (ref 11.5–15.5)
HGB BLD-MCNC: 8.5 G/DL — LOW (ref 11.5–15.5)
MAGNESIUM SERPL-MCNC: 1.9 MG/DL — SIGNIFICANT CHANGE UP (ref 1.6–2.6)
MCHC RBC-ENTMCNC: 28.9 PG — SIGNIFICANT CHANGE UP (ref 27–34)
MCHC RBC-ENTMCNC: 29.7 PG — SIGNIFICANT CHANGE UP (ref 27–34)
MCHC RBC-ENTMCNC: 30.8 PG — SIGNIFICANT CHANGE UP (ref 27–34)
MCHC RBC-ENTMCNC: 32.1 GM/DL — SIGNIFICANT CHANGE UP (ref 32–36)
MCHC RBC-ENTMCNC: 33.3 GM/DL — SIGNIFICANT CHANGE UP (ref 32–36)
MCHC RBC-ENTMCNC: 34.3 GM/DL — SIGNIFICANT CHANGE UP (ref 32–36)
MCV RBC AUTO: 89.2 FL — SIGNIFICANT CHANGE UP (ref 80–100)
MCV RBC AUTO: 89.7 FL — SIGNIFICANT CHANGE UP (ref 80–100)
MCV RBC AUTO: 90 FL — SIGNIFICANT CHANGE UP (ref 80–100)
PHOSPHATE SERPL-MCNC: 2.6 MG/DL — SIGNIFICANT CHANGE UP (ref 2.5–4.5)
PLATELET # BLD AUTO: 212 K/UL — SIGNIFICANT CHANGE UP (ref 150–400)
PLATELET # BLD AUTO: 225 K/UL — SIGNIFICANT CHANGE UP (ref 150–400)
PLATELET # BLD AUTO: 239 K/UL — SIGNIFICANT CHANGE UP (ref 150–400)
POTASSIUM SERPL-MCNC: 3.7 MMOL/L — SIGNIFICANT CHANGE UP (ref 3.5–5.3)
POTASSIUM SERPL-SCNC: 3.7 MMOL/L — SIGNIFICANT CHANGE UP (ref 3.5–5.3)
PROCALCITONIN SERPL-MCNC: 2.32 NG/ML — HIGH (ref 0.02–0.1)
RBC # BLD: 2.33 M/UL — LOW (ref 3.8–5.2)
RBC # BLD: 2.43 M/UL — LOW (ref 3.8–5.2)
RBC # BLD: 2.77 M/UL — LOW (ref 3.8–5.2)
RBC # FLD: 15.6 % — HIGH (ref 10.3–14.5)
RBC # FLD: 16.7 % — HIGH (ref 10.3–14.5)
RBC # FLD: 17 % — HIGH (ref 10.3–14.5)
RH IG SCN BLD-IMP: NEGATIVE — SIGNIFICANT CHANGE UP
SODIUM SERPL-SCNC: 132 MMOL/L — LOW (ref 135–145)
SPECIMEN SOURCE: SIGNIFICANT CHANGE UP
WBC # BLD: 20.2 K/UL — HIGH (ref 3.8–10.5)
WBC # BLD: 21.7 K/UL — HIGH (ref 3.8–10.5)
WBC # BLD: 22.8 K/UL — HIGH (ref 3.8–10.5)
WBC # FLD AUTO: 20.2 K/UL — HIGH (ref 3.8–10.5)
WBC # FLD AUTO: 21.7 K/UL — HIGH (ref 3.8–10.5)
WBC # FLD AUTO: 22.8 K/UL — HIGH (ref 3.8–10.5)

## 2018-08-05 PROCEDURE — 73552 X-RAY EXAM OF FEMUR 2/>: CPT | Mod: 26,RT

## 2018-08-05 PROCEDURE — 99291 CRITICAL CARE FIRST HOUR: CPT

## 2018-08-05 PROCEDURE — 71045 X-RAY EXAM CHEST 1 VIEW: CPT | Mod: 26

## 2018-08-05 RX ORDER — POTASSIUM CHLORIDE 20 MEQ
20 PACKET (EA) ORAL ONCE
Qty: 0 | Refills: 0 | Status: COMPLETED | OUTPATIENT
Start: 2018-08-05 | End: 2018-08-05

## 2018-08-05 RX ORDER — ENOXAPARIN SODIUM 100 MG/ML
40 INJECTION SUBCUTANEOUS DAILY
Qty: 0 | Refills: 0 | Status: DISCONTINUED | OUTPATIENT
Start: 2018-08-05 | End: 2018-10-04

## 2018-08-05 RX ORDER — INSULIN LISPRO 100/ML
VIAL (ML) SUBCUTANEOUS EVERY 4 HOURS
Qty: 0 | Refills: 0 | Status: DISCONTINUED | OUTPATIENT
Start: 2018-08-05 | End: 2018-08-12

## 2018-08-05 RX ORDER — INSULIN HUMAN 100 [IU]/ML
1 INJECTION, SOLUTION SUBCUTANEOUS
Qty: 100 | Refills: 0 | Status: DISCONTINUED | OUTPATIENT
Start: 2018-08-05 | End: 2018-08-05

## 2018-08-05 RX ORDER — DEXTROSE 50 % IN WATER 50 %
25 SYRINGE (ML) INTRAVENOUS
Qty: 0 | Refills: 0 | Status: DISCONTINUED | OUTPATIENT
Start: 2018-08-05 | End: 2018-08-05

## 2018-08-05 RX ADMIN — SENNA PLUS 5 MILLILITER(S): 8.6 TABLET ORAL at 22:12

## 2018-08-05 RX ADMIN — Medication 0.5 MILLIGRAM(S): at 05:22

## 2018-08-05 RX ADMIN — Medication 0.5 MILLIGRAM(S): at 17:09

## 2018-08-05 RX ADMIN — Medication 250 MILLIMOLE(S): at 07:15

## 2018-08-05 RX ADMIN — MIRTAZAPINE 45 MILLIGRAM(S): 45 TABLET, ORALLY DISINTEGRATING ORAL at 22:12

## 2018-08-05 RX ADMIN — CHLORHEXIDINE GLUCONATE 15 MILLILITER(S): 213 SOLUTION TOPICAL at 05:32

## 2018-08-05 RX ADMIN — CHLORHEXIDINE GLUCONATE 15 MILLILITER(S): 213 SOLUTION TOPICAL at 22:12

## 2018-08-05 RX ADMIN — Medication 2: at 05:56

## 2018-08-05 RX ADMIN — HEPARIN SODIUM 5000 UNIT(S): 5000 INJECTION INTRAVENOUS; SUBCUTANEOUS at 05:32

## 2018-08-05 RX ADMIN — DAPTOMYCIN 120 MILLIGRAM(S): 500 INJECTION, POWDER, LYOPHILIZED, FOR SOLUTION INTRAVENOUS at 15:24

## 2018-08-05 RX ADMIN — QUETIAPINE FUMARATE 50 MILLIGRAM(S): 200 TABLET, FILM COATED ORAL at 22:12

## 2018-08-05 RX ADMIN — HYDROMORPHONE HYDROCHLORIDE 0.5 MILLIGRAM(S): 2 INJECTION INTRAMUSCULAR; INTRAVENOUS; SUBCUTANEOUS at 13:10

## 2018-08-05 RX ADMIN — DEXMEDETOMIDINE HYDROCHLORIDE IN 0.9% SODIUM CHLORIDE 0.5 MICROGRAM(S)/KG/HR: 4 INJECTION INTRAVENOUS at 22:12

## 2018-08-05 RX ADMIN — HYDROMORPHONE HYDROCHLORIDE 0.5 MILLIGRAM(S): 2 INJECTION INTRAMUSCULAR; INTRAVENOUS; SUBCUTANEOUS at 07:15

## 2018-08-05 RX ADMIN — DEXMEDETOMIDINE HYDROCHLORIDE IN 0.9% SODIUM CHLORIDE 0.5 MICROGRAM(S)/KG/HR: 4 INJECTION INTRAVENOUS at 18:59

## 2018-08-05 RX ADMIN — LIDOCAINE 1 PATCH: 4 CREAM TOPICAL at 22:13

## 2018-08-05 RX ADMIN — CEFTAROLINE FOSAMIL 50 MILLIGRAM(S): 600 POWDER, FOR SOLUTION INTRAVENOUS at 05:32

## 2018-08-05 RX ADMIN — DIPHENHYDRAMINE HYDROCHLORIDE AND LIDOCAINE HYDROCHLORIDE AND ALUMINUM HYDROXIDE AND MAGNESIUM HYDRO 5 MILLILITER(S): KIT at 12:19

## 2018-08-05 RX ADMIN — HEPARIN SODIUM 5000 UNIT(S): 5000 INJECTION INTRAVENOUS; SUBCUTANEOUS at 15:24

## 2018-08-05 RX ADMIN — Medication 3 MILLILITER(S): at 11:12

## 2018-08-05 RX ADMIN — LIDOCAINE 1 PATCH: 4 CREAM TOPICAL at 10:28

## 2018-08-05 RX ADMIN — Medication 8: at 10:22

## 2018-08-05 RX ADMIN — CHLORHEXIDINE GLUCONATE 15 MILLILITER(S): 213 SOLUTION TOPICAL at 15:24

## 2018-08-05 RX ADMIN — Medication 3 MILLILITER(S): at 17:09

## 2018-08-05 RX ADMIN — Medication 10 MILLIGRAM(S): at 05:32

## 2018-08-05 RX ADMIN — Medication 100 MILLIGRAM(S): at 22:12

## 2018-08-05 RX ADMIN — Medication 100 MILLIGRAM(S): at 15:46

## 2018-08-05 RX ADMIN — Medication 3 MILLILITER(S): at 05:22

## 2018-08-05 RX ADMIN — Medication 4: at 04:07

## 2018-08-05 RX ADMIN — Medication 100 MILLIEQUIVALENT(S): at 07:20

## 2018-08-05 RX ADMIN — CEFTAROLINE FOSAMIL 50 MILLIGRAM(S): 600 POWDER, FOR SOLUTION INTRAVENOUS at 17:41

## 2018-08-05 RX ADMIN — Medication 100 MILLIGRAM(S): at 05:33

## 2018-08-05 RX ADMIN — Medication 1 MILLIGRAM(S): at 03:30

## 2018-08-05 RX ADMIN — FAMOTIDINE 20 MILLIGRAM(S): 10 INJECTION INTRAVENOUS at 05:56

## 2018-08-05 RX ADMIN — DEXMEDETOMIDINE HYDROCHLORIDE IN 0.9% SODIUM CHLORIDE 0.5 MICROGRAM(S)/KG/HR: 4 INJECTION INTRAVENOUS at 08:25

## 2018-08-05 RX ADMIN — CHLORHEXIDINE GLUCONATE 1 APPLICATION(S): 213 SOLUTION TOPICAL at 05:34

## 2018-08-05 RX ADMIN — SERTRALINE 50 MILLIGRAM(S): 25 TABLET, FILM COATED ORAL at 12:19

## 2018-08-05 RX ADMIN — HYDROMORPHONE HYDROCHLORIDE 0.5 MILLIGRAM(S): 2 INJECTION INTRAMUSCULAR; INTRAVENOUS; SUBCUTANEOUS at 06:57

## 2018-08-05 RX ADMIN — Medication 4: at 14:10

## 2018-08-05 RX ADMIN — HYDROMORPHONE HYDROCHLORIDE 0.5 MILLIGRAM(S): 2 INJECTION INTRAMUSCULAR; INTRAVENOUS; SUBCUTANEOUS at 21:09

## 2018-08-05 RX ADMIN — Medication 1 MILLIGRAM(S): at 16:00

## 2018-08-05 RX ADMIN — DEXMEDETOMIDINE HYDROCHLORIDE IN 0.9% SODIUM CHLORIDE 0.5 MICROGRAM(S)/KG/HR: 4 INJECTION INTRAVENOUS at 00:00

## 2018-08-05 RX ADMIN — HYDROMORPHONE HYDROCHLORIDE 0.5 MILLIGRAM(S): 2 INJECTION INTRAMUSCULAR; INTRAVENOUS; SUBCUTANEOUS at 20:54

## 2018-08-05 RX ADMIN — ENOXAPARIN SODIUM 40 MILLIGRAM(S): 100 INJECTION SUBCUTANEOUS at 22:11

## 2018-08-05 RX ADMIN — FAMOTIDINE 20 MILLIGRAM(S): 10 INJECTION INTRAVENOUS at 17:41

## 2018-08-05 RX ADMIN — HYDROMORPHONE HYDROCHLORIDE 0.5 MILLIGRAM(S): 2 INJECTION INTRAMUSCULAR; INTRAVENOUS; SUBCUTANEOUS at 13:25

## 2018-08-05 RX ADMIN — Medication 250 MILLIMOLE(S): at 11:40

## 2018-08-05 NOTE — PROGRESS NOTE ADULT - ASSESSMENT
anemia  transfused one unit prbc monitor h/h     electrolyte imbalance     sepsis     intubated continue weaning protocol

## 2018-08-05 NOTE — PROGRESS NOTE ADULT - ATTENDING COMMENTS
I saw and evaluated patient and agree with above note  the patient is intubated on volume control mode with good gas exchange for post surgical acute respiratory failure  the patient is markedly weak and deconditioned.  Now s/p removal infected hardware.  did not tolerate breathing trial   post hemorrhagic anemia.  transfused yesterday and continuing to monitor hematocrit  hypotensive on phenylephrine but his is a low dose  continue antibiotics per infectious disease  plan to continue ventilator management  titrate pressors likely to off  will start insulin drip for hyperglycemia  35 minutes of critical care management applied

## 2018-08-05 NOTE — PROGRESS NOTE ADULT - SUBJECTIVE AND OBJECTIVE BOX
HISTORY:  68 year old female with a past medical history of asthma on steroids, CVA (no residual deficits), pulmonary HTN, HLD, GERD, ulcerative colitis, fatty pancreas, impaired glucose tolerance, anxiety, and depression who presented on 7/26/2018 with altered mental status, rigors, and urinary frequency. Of note, patient was recently hospitalized for a right femoral neck fracture secondary to osteoporosis s/p hemiarthroplasty on 6/22/2018. She had been taking Plavix for her history of CVA and Lovenox for VTE prophylaxis but she developed a hematoma at her surgical site so the Lovenox was stopped. Since then, patient has been progressively more agitated, confused, and weak. Additionally, patient has been complaining of new onset low back pain and left hip pain. In the ED, patient was hemodynamically stable but noted to be hypoglycemic to the 20s so she received 2 amps of D50. She was also given 2 L of crystalloid and started on meropenem & vancomycin for concern of sepsis. CT scan revealed a 17 cm collection adjacent to the right hip prosthesis, an acute L2 compression fracture, and new left acetabular fracture. Patient was admitted to medicine but on 7/27/2018, patient was noted to be more altered, rigorous, tachypneic, febrile to 101 F, and hypotensive w/ SBP in the 60s so an RRT was called. She was given 1 L of crystalloid and started on a norepinephrine gtt. Patient subsequently admitted to SICU for hemodynamic monitoring. An emergent right axillary arterial line and left IJ central venous catheter was placed.    24 HOUR EVENTS:  - HISTORY:  68 year old female with a past medical history of asthma on steroids, CVA (no residual deficits), pulmonary HTN, HLD, GERD, ulcerative colitis, fatty pancreas, impaired glucose tolerance, anxiety, and depression who presented on 7/26/2018 with altered mental status, rigors, and urinary frequency. Of note, patient was recently hospitalized for a right femoral neck fracture secondary to osteoporosis s/p hemiarthroplasty on 6/22/2018. She had been taking Plavix for her history of CVA and Lovenox for VTE prophylaxis but she developed a hematoma at her surgical site so the Lovenox was stopped. Since then, patient has been progressively more agitated, confused, and weak. Additionally, patient has been complaining of new onset low back pain and left hip pain. In the ED, patient was hemodynamically stable but noted to be hypoglycemic to the 20s so she received 2 amps of D50. She was also given 2 L of crystalloid and started on meropenem & vancomycin for concern of sepsis. CT scan revealed a 17 cm collection adjacent to the right hip prosthesis, an acute L2 compression fracture, and new left acetabular fracture. Patient was admitted to medicine but on 7/27/2018, patient was noted to be more altered, rigorous, tachypneic, febrile to 101 F, and hypotensive w/ SBP in the 60s so an RRT was called. She was given 1 L of crystalloid and started on a norepinephrine gtt. Patient subsequently admitted to SICU for hemodynamic monitoring. An emergent right axillary arterial line and left IJ central venous catheter was placed.    SUBJECTIVE/ROS:  [ ] A ten-point review of systems was otherwise negative except as noted.  [x] Due to altered mental status/intubation, subjective information were not able to be obtained from the patient. History was obtained, to the extent possible, from review of the chart and collateral sources of information.    NEURO  Exam: sedated, intermittently agitated, does not follow commands, moving all four extremities  Meds:  - dexmedetomidine Infusion 0.04 MICROgram(s)/kG/Hr IV Continuous <Continuous>  - HYDROmorphone  Injectable 0.5 milliGRAM(s) IV Push every 3 hours PRN Severe Pain (7 - 10)  - LORazepam   Injectable 1 milliGRAM(s) IV Push every 4 hours PRN Agitation  - mirtazapine 45 milliGRAM(s) Oral <User Schedule>  - QUEtiapine 50 milliGRAM(s) Oral <User Schedule>  - sertraline 50 milliGRAM(s) Oral daily  [x] Adequacy of sedation and pain control has been assessed and adjusted    RESPIRATORY  RR: 30 (08-05-18 @ 07:00) (12 - 40)  SpO2: 99% (08-05-18 @ 07:00) (90% - 100%)  Exam: clear to auscultation bilaterally  Mechanical Ventilation: Mode: AC/ CMV (Assist Control/ Continuous Mandatory Ventilation), RR (machine): 16, RR (patient): 25, TV (machine): 400, FiO2: 30, PEEP: 5, ITime: 0.9, MAP: 8, PIP: 11  [x] Extubation Readiness Assessed  ABG - ( 05 Aug 2018 03:24 )  pH: 7.51  /  pCO2: 26    /  pO2: 101   / HCO3: 20    / Base Excess: -2.2  /  SaO2: 99    /     Lactate: 1.9  Meds:  - ALBUTerol/ipratropium for Nebulization 3 milliLiter(s) Nebulizer every 6 hours  - buDESOnide   0.5 milliGRAM(s) Respule 0.5 milliGRAM(s) Inhalation every 12 hours    CARDIOVASCULAR  HR: 100 (08-05-18 @ 07:00) (78 - 102)  BP: 124/58 (08-05-18 @ 07:00) (78/40 - 159/65)  BP(mean): 84 (08-05-18 @ 07:00) (54 - 99)  ABP: 116/48 (08-04-18 @ 16:00) (97/43 - 132/56)  ABP(mean): 74 (08-04-18 @ 16:00) (61 - 86)  Exam: regular rate and rhythm, S1S2  Cardiac Rhythm: sinus  Perfusion    [x]Adequate    [ ]Inadequate  Mentation   [ ]Normal       [x]Reduced  Extremities  [x]Warm         [ ]Cool  Volume Status [ ]Hypervolemic [x]Euvolemic [ ]Hypovolemic  Meds: phenylephrine    Infusion 0.5 MICROgram(s)/kG/Min IV Continuous <Continuous>    GI/NUTRITION  Exam: soft, nontender, nondistended  Diet: NPO  Meds:  - docusate sodium Liquid 100 milliGRAM(s) Enteral Tube three times a day  - famotidine Injectable 20 milliGRAM(s) IV Push two times a day  - senna Syrup 5 milliLiter(s) Oral at bedtime    GENITOURINARY  I&O's Detail    08-04 @ 07:01  -  08-05 @ 07:00  --------------------------------------------------------  IN:    dexmedetomidine Infusion: 371.7 mL    dextrose 5% + lactated ringers.: 1725 mL    IV PiggyBack: 100 mL    ns in tub fed  vmyoya40: 910 mL    phenylephrine   Infusion: 160.8 mL  Total IN: 3267.5 mL    OUT:    Accordian: 130 mL    Accordian: 330 mL    Indwelling Catheter - Urethral: 950 mL    Nasoenteral Tube: 100 mL  Total OUT: 1510 mL    Total NET: 1757.5 mL    132<L>  |  104  |  19  ----------------------------<  288<H>  3.7   |  19<L>  |  0.81    Ca    6.5<LL>      05 Aug 2018 03:26  Phos  2.6  Mg     1.9      [x] Blackman catheter, indication: urine output monitoring in the critically ill  Meds: dextrose 5% + lactated ringers infuse at 75 mL/hr    HEMATOLOGIC  Meds: heparin  Injectable 5000 Unit(s) SubCutaneous every 8 hours  [x] VTE Prophylaxis                        6.9    20.2  )-----------( 239      ( 05 Aug 2018 04:30 )             20.8     INFECTIOUS DISEASES  T(C): 37.7 (08-05-18 @ 04:00), Max: 37.7 (08-04-18 @ 12:00)  WBC Count:  - 20.2 K/uL (08-05 @ 04:30)  - 21.7 K/uL (08-05 @ 03:26)    Recent Cultures:  Specimen Source: .Tissue Other, sxiorium right hip, 08-04 @ 07:29; Results --; Gram Stain:   Moderate polymorphonuclear leukocytes seen per low power field  Few Gram Positive Cocci seen per oil power field; Organism: --  Specimen Source: .Blood Blood, 08-04 @ 07:00; Results   No growth to date.; Gram Stain: --; Organism: --  Specimen Source: .Blood Blood-Peripheral, 08-01 @ 08:47; Results   Growth in anaerobic bottle: Methicillin resistant Staphylococcus aureus  See previous culture [id number]; Gram Stain:   Growth in anaerobic bottle: Gram Positive Cocci in Clusters; Organism: --  Specimen Source: .Blood Blood-Peripheral, 07-31 @ 08:35; Results   Growth in aerobic and anaerobic bottles: Methicillin resistant  Staphylococcus aureus  See previous culture [id number]; Gram Stain:   Growth in anaerobic bottle: Gram Positive Cocci in Clusters  Growth in aerobic bottle: Gram Positive Cocci in Clusters; Organism: --  Specimen Source: .Blood Blood-Venous, 07-30 @ 05:44; Results   Growth in aerobic bottle: Methicillin resistant Staphylococcus aureus  Two aerobic bottles received only.; Gram Stain:   Growth in aerobic bottle: Gram Positive Cocci in Clusters  Growth in aerobic bottle: Gram Positive Cocci in Clusters; Organism: Methicillin resistant Staphylococcus aureus  Specimen Source: .Blood Blood-Venous, 07-29 @ 08:29; Results   Growth in aerobic and anaerobic bottles: Methicillin resistant  Staphylococcus aureus  See previous culture [id number]; Gram Stain:   Growth in aerobic bottle: Gram Positive Cocci in Clusters  Growth in anaerobic bottle: Gram Positive Cocci in Clusters; Organism: --    Meds:  - Ceftaroline Fosamil IVPB 600 milliGRAM(s) IV Intermittent every 12 hours  - DAPTOmycin IVPB 500 milliGRAM(s) IV Intermittent every 24 hours    ENDOCRINE  Capillary Blood Glucose:  - 164 mg/dL (05 Aug 2018 05:46)  - 215 mg/dL (05 Aug 2018 03:59)  - 177 mg/dL (04 Aug 2018 22:22)  - 216 mg/dL (04 Aug 2018 22:20)  - 219 mg/dL (04 Aug 2018 18:11)  - 92 mg/dL (04 Aug 2018 12:23)  Meds:  - insulin lispro (HumaLOG) corrective regimen sliding scale   SubCutaneous every 4 hours  - predniSONE   Tablet 10 milliGRAM(s) Oral every 24 hours    ACCESS DEVICES:  [x] Peripheral IV  [x] Central Venous Line	[ ] R	[x] L	[x] IJ	[ ] Fem	[ ] SC		Placed: 7/27/2018  [x] Arterial Line		[ ] R	[x] L	[ ] Fem	[ ] Rad	[x] Brachial	Placed: 7/27/2018  [ ] PICC:					[ ] Mediport  [x] Urinary Catheter, Date Placed: 7/26/2018  [x] Necessity of urinary, arterial, and venous catheters discussed    OTHER MEDICATIONS:  - chlorhexidine 0.12% Liquid 15 milliLiter(s) Swish and Spit <User Schedule>  - chlorhexidine 4% Liquid 1 Application(s) Topical <User Schedule>  - FIRST- Mouthwash  BLM 5 milliLiter(s) Swish and Spit daily  - lidocaine   Patch 1 Patch Transdermal every 24 hours    CODE STATUS: Full code.    IMAGING:

## 2018-08-05 NOTE — PROGRESS NOTE ADULT - SUBJECTIVE AND OBJECTIVE BOX
Pt S/E at bedside, no acute events overnight, pain controlled. Pt remains intubated/sedated in SICU. Pt receiving one unit PRBC this morning.    Vital Signs Last 24 Hrs  T(C): 37.7 (05 Aug 2018 04:00), Max: 37.7 (04 Aug 2018 12:00)  T(F): 99.9 (05 Aug 2018 04:00), Max: 99.9 (05 Aug 2018 04:00)  HR: 100 (05 Aug 2018 07:00) (78 - 102)  BP: 124/58 (05 Aug 2018 07:00) (78/40 - 159/65)  BP(mean): 84 (05 Aug 2018 07:00) (54 - 99)  RR: 30 (05 Aug 2018 07:00) (12 - 40)  SpO2: 99% (05 Aug 2018 07:00) (90% - 100%)    Gen: Intubated/sedated    Right Lower Extremity:  Dressing clean dry intact  Unable to fully assess motor/sensory 2/2 intubation/sedation  +DP/PT Pulses  Compartments soft  No calf TTP B/L

## 2018-08-05 NOTE — PROGRESS NOTE ADULT - ASSESSMENT
ASSESSMENT:  68 year old female presenting with symptomatic hypoglycemia and septic shock secondary to an infected right hip s/p right hip I&D with hemiarthroplasty revision.    PLAN:    Neuro: acute post-op pain, intubated, anxiety, depression, dementia  - Monitor mental status.  - Sedation with Precedex while intubated. Ativan PRN breakthrough agitation.  - Dilaudid PRN pain.  - Home Zoloft, Seroquel, and Remeron.    Resp: acute respiratory distress/failure, asthma  - Monitor pulse oximeter.  - Mechanical ventilation for acute respiratory distress/failure.  - Pulmicort and Duoneb for asthma.    CV: post-op hypotension  - Monitor vital signs.  - Wean phenylephrine gtt as tolerated for goal MAP > 65.    GI: no acute issues  - NPO with Jevity at goal of 45 mL/hr.  - Protonix for GERD.  - Bowel regimen with Colace & senna.    Renal: CKD stage 2, hyponatremia  - Monitor I&Os.  - Monitor electrolytes and replete as necessary.  - D5LR at 75 mL/hr while hypoglycemic, NPO, and being actively resuscitated.    Heme: no acute issues  - Lovenox for VTE prophylaxis.    ID: MRSA bacteremia, infected right hip s/p I&D  - Monitor WBC, temperature, and procalcitonin.  - Follow up OR and repeat blood cultures. Reculture until patient is no longer bacteremic.  - Daptomycin with ceftaroline for MRSA bacteremia.    Endo: hypoglycemia (resolved), hyperglycemia, adrenal insufficiency  - Prednisone 10 mg daily for asthma.  - Monitor fingersticks q4hrs for hypoglycemia.    Disposition:  - Full code.  - Will remain in SICU for respiratory and hemodynamic monitoring.    Cynthia Wilkins PA-C    [id number]

## 2018-08-05 NOTE — PROGRESS NOTE ADULT - ASSESSMENT
69 yo female with dementia, history of UC, and s/p RT Hip hemiarthroplasty 6/22/18  postsurgical hematoma  admitted with low grade fever, rigors, increased confusion, found to have leukocytosis, 20% bands, ICU, pressor use   Bld Cxs 7/26-8/1 still positive all MRSA  s/p R hip I&D, lavage, poly exchange- pus encountered, all specimens S aureus  Afebrile, Creat stable  Now on Dapto and ceftaroline, but cultures remain positive  GAYATHRI: no evidence of valvular vegetation  reintubated again, low grade fevers  s/p return to OR, removal of hardware/spacer  leukocytosis likely acute leukemoid reaction, stress leukocytosis--moderating  Plan:  cont combo Tx with Dapto and Ceftaroline for now  downgrade to Dapto/Rifampin might be an option once bacteremia controlled  f/u repeat blood cult to ensure clearance of bacteremia, blood cult from 8/4 no growth so far  reviewed with ICU RN, supportive care as per ICU, ? weaning trials if tolerates  f/u OR cultures

## 2018-08-05 NOTE — PROGRESS NOTE ADULT - ASSESSMENT
68F s/p R hip explant with articulating spacer and distal femur ORIF POD 2  Analgesia  DVT ppx  NWB RLE  Monitor HMV output  FU PICC  FU OR cultures  Apprec ID recs  Cont care per SICU  FU post op XRays R femur  DC planning

## 2018-08-05 NOTE — PROGRESS NOTE ADULT - SUBJECTIVE AND OBJECTIVE BOX
---___---___---___---___---___---___ ---___---___---___---___---___---___---___---___---___---                    <<<  M E D I C A L   A T T E N D I N G    F O L L O W    U P   N O T E  >>>    patient seen at bedside. sp spacer as per orthopedic needed one unit prbc , also still intubated on pressors needed to be weaned down    ---___---___---___---___---___---      <<<  MEDICATIONS:  >>>    MEDICATIONS  (STANDING):  ALBUTerol/ipratropium for Nebulization 3 milliLiter(s) Nebulizer every 6 hours  buDESOnide   0.5 milliGRAM(s) Respule 0.5 milliGRAM(s) Inhalation every 12 hours  Ceftaroline Fosamil IVPB 600 milliGRAM(s) IV Intermittent every 12 hours  chlorhexidine 0.12% Liquid 15 milliLiter(s) Swish and Spit <User Schedule>  chlorhexidine 4% Liquid 1 Application(s) Topical <User Schedule>  DAPTOmycin IVPB 500 milliGRAM(s) IV Intermittent every 24 hours  dexmedetomidine Infusion 0.04 MICROgram(s)/kG/Hr (0.504 mL/Hr) IV Continuous <Continuous>  dextrose 5% + lactated ringers. 1000 milliLiter(s) (75 mL/Hr) IV Continuous <Continuous>  docusate sodium Liquid 100 milliGRAM(s) Enteral Tube three times a day  famotidine Injectable 20 milliGRAM(s) IV Push two times a day  FIRST- Mouthwash  BLM 5 milliLiter(s) Swish and Spit daily  heparin  Injectable 5000 Unit(s) SubCutaneous every 8 hours  insulin lispro (HumaLOG) corrective regimen sliding scale   SubCutaneous every 4 hours  lidocaine   Patch 1 Patch Transdermal every 24 hours  mirtazapine 45 milliGRAM(s) Oral <User Schedule>  phenylephrine    Infusion 0.5 MICROgram(s)/kG/Min (9.45 mL/Hr) IV Continuous <Continuous>  predniSONE   Tablet 10 milliGRAM(s) Oral every 24 hours  QUEtiapine 50 milliGRAM(s) Oral <User Schedule>  senna Syrup 5 milliLiter(s) Oral at bedtime  sertraline 50 milliGRAM(s) Oral daily  sodium phosphate IVPB 15 milliMole(s) IV Intermittent every 1 hour      MEDICATIONS  (PRN):  HYDROmorphone  Injectable 0.5 milliGRAM(s) IV Push every 3 hours PRN Severe Pain (7 - 10)  LORazepam   Injectable 1 milliGRAM(s) IV Push every 4 hours PRN Agitation       ---___---___---___---___---___---     <<<REVIEW OF SYSTEM: >>>    unable to obtain patient sedated      ---___---___---___---___---___---          <<<  VITAL SIGNS: >>>    T(F): 99.9 (08-05-18 @ 04:00), Max: 99.9 (08-05-18 @ 04:00)  HR: 100 (08-05-18 @ 07:00) (78 - 102)  BP: 124/58 (08-05-18 @ 07:00) (78/40 - 159/65)  RR: 30 (08-05-18 @ 07:00) (12 - 40)  SpO2: 99% (08-05-18 @ 07:00) (90% - 100%)  Wt(kg): --  CAPILLARY BLOOD GLUCOSE      POCT Blood Glucose.: 164 mg/dL (05 Aug 2018 05:46)    I&O's Summary    04 Aug 2018 07:01  -  05 Aug 2018 07:00  --------------------------------------------------------  IN: 3267.5 mL / OUT: 1510 mL / NET: 1757.5 mL         ---___---___---___---___---___---                       PHYSICAL EXAM:    GEN: intubated sedated   HEENT: NCAT, PERRL, MMM, no scleral icterus, hearing intact  NECK: Supple, No JVD  CVS: S1S2 , regular , No M/R/G appreciated  PULM: CTA B/L,  no W/R/R appreciated intubated  ABD.: soft. non tender, non distended,  bowel sounds present  Extrem: intact pulses , no edema noted  Derm: No rash or ecchymosis noted        ---___---___---___---___---___---     <<<  LAB AND IMAGING: >>>                          6.9    20.2  )-----------( 239      ( 05 Aug 2018 04:30 )             20.8               08-05    132<L>  |  104  |  19  ----------------------------<  288<H>  3.7   |  19<L>  |  0.81    Ca    6.5<LL>      05 Aug 2018 03:26  Phos  2.6     08-05  Mg     1.9     08-05      PT/INR - ( 04 Aug 2018 04:10 )   PT: 14.4 sec;   INR: 1.33 ratio         PTT - ( 04 Aug 2018 04:10 )  PTT:28.8 sec                   ABG - ( 05 Aug 2018 03:24 )  pH, Arterial: 7.51  pH, Blood: x     /  pCO2: 26    /  pO2: 101   / HCO3: 20    / Base Excess: -2.2  /  SaO2: 99                      [All pertinent / recent available Imaging reports and other labs reviewed]     ---___---___---___---___---___---___ ---___---___---___---___---           <<<  A S S E S S M E N T   A N D   P L A N :  >>>          -GI/DVT Prophylaxis.          >>______________________<<      Deniz Bolden .         phone   [phone]

## 2018-08-05 NOTE — PROVIDER CONTACT NOTE (OTHER) - ASSESSMENT
Pt intubated full support. Sedated on precidex. On tube feeds and dextrose containing IVF. BG in ER during admission was in the 20s. On yue for pressure support. Afebrile. NSR BG 0600 >200 Pt intubated full support. Sedated on precidex. On tube feeds and dextrose containing IVF. BG in ER during admission was in the 20s. On yue for pressure support. Afebrile. NSR BG 0600 164

## 2018-08-05 NOTE — PROGRESS NOTE ADULT - SUBJECTIVE AND OBJECTIVE BOX
CC: f/u for high grade sustained MRSA bacteremia    Pt remains in ICU, intubated  s/p return to OR for removal of hardware/spacer    REVIEW OF SYSTEMS:  as above    Antimicrobials Day # 10  Ceftaroline Fosamil IVPB 600 milliGRAM(s) IV Intermittent every 12 hours  DAPTOmycin IVPB      DAPTOmycin IVPB 500 milliGRAM(s) IV Intermittent every 24 hours      Medications Reviewed    ICU Vital Signs Last 24 Hrs  T(C): 37.7 (05 Aug 2018 07:15), Max: 37.7 (04 Aug 2018 12:00)  T(F): 99.8 (05 Aug 2018 07:15), Max: 99.9 (05 Aug 2018 04:00)  HR: 93 (05 Aug 2018 08:00) (78 - 102)  BP: 131/63 (05 Aug 2018 08:00) (78/40 - 159/65)  BP(mean): 90 (05 Aug 2018 08:00) (54 - 99)  ABP: 116/48 (04 Aug 2018 16:00) (97/43 - 132/56)  ABP(mean): 74 (04 Aug 2018 16:00) (61 - 86)  RR: 20 (05 Aug 2018 08:00) (12 - 40)  SpO2: 100% (05 Aug 2018 08:00) (90% - 100%)              PHYSICAL EXAM:  General: no acute distress, intubated  Eyes:  anicteric, no conjunctival injection, no discharge  Oropharynx: clear, no exudate  Neck: supple  Lungs: clear to auscultation  Heart: regular rate and rhythm; no murmur, rubs or gallops  Abdomen: soft, nondistended, nontender, without mass or organomegaly  Blackman  Skin: R hip incision dressing intact; hemovac x 2  Extremities: no edema  Neurologic: sedated    LAB RESULTS:                                                                  6.9    20.2  )-----------( 239      ( 05 Aug 2018 04:30 )             20.8   08-05    132<L>  |  104  |  19  ----------------------------<  288<H>  3.7   |  19<L>  |  0.81    Ca    6.5<LL>      05 Aug 2018 03:26  Phos  2.6     08-05  Mg     1.9     08-05                        MICROBIOLOGY:  RECENT CULTURES REVIEWED        RADIOLOGY REVIEWED

## 2018-08-06 LAB
ANION GAP SERPL CALC-SCNC: 12 MMOL/L — SIGNIFICANT CHANGE UP (ref 5–17)
APTT BLD: 39.2 SEC — HIGH (ref 27.5–37.4)
BUN SERPL-MCNC: 21 MG/DL — SIGNIFICANT CHANGE UP (ref 7–23)
CALCIUM SERPL-MCNC: 6.6 MG/DL — LOW (ref 8.4–10.5)
CHLORIDE SERPL-SCNC: 106 MMOL/L — SIGNIFICANT CHANGE UP (ref 96–108)
CO2 SERPL-SCNC: 19 MMOL/L — LOW (ref 22–31)
CREAT SERPL-MCNC: 0.88 MG/DL — SIGNIFICANT CHANGE UP (ref 0.5–1.3)
GAS PNL BLDA: SIGNIFICANT CHANGE UP
GAS PNL BLDA: SIGNIFICANT CHANGE UP
GLUCOSE SERPL-MCNC: 237 MG/DL — HIGH (ref 70–99)
GRAM STN FLD: SIGNIFICANT CHANGE UP
GRAM STN FLD: SIGNIFICANT CHANGE UP
HCT VFR BLD CALC: 22.3 % — LOW (ref 34.5–45)
HCT VFR BLD CALC: 22.6 % — LOW (ref 34.5–45)
HCT VFR BLD CALC: 23.3 % — LOW (ref 34.5–45)
HCT VFR BLD CALC: 25.3 % — LOW (ref 34.5–45)
HGB BLD-MCNC: 7.4 G/DL — LOW (ref 11.5–15.5)
HGB BLD-MCNC: 7.6 G/DL — LOW (ref 11.5–15.5)
HGB BLD-MCNC: 7.6 G/DL — LOW (ref 11.5–15.5)
HGB BLD-MCNC: 8.6 G/DL — LOW (ref 11.5–15.5)
INR BLD: 1.19 RATIO — HIGH (ref 0.88–1.16)
MAGNESIUM SERPL-MCNC: 1.8 MG/DL — SIGNIFICANT CHANGE UP (ref 1.6–2.6)
MCHC RBC-ENTMCNC: 29 PG — SIGNIFICANT CHANGE UP (ref 27–34)
MCHC RBC-ENTMCNC: 29.8 PG — SIGNIFICANT CHANGE UP (ref 27–34)
MCHC RBC-ENTMCNC: 29.9 PG — SIGNIFICANT CHANGE UP (ref 27–34)
MCHC RBC-ENTMCNC: 30.3 PG — SIGNIFICANT CHANGE UP (ref 27–34)
MCHC RBC-ENTMCNC: 32.6 GM/DL — SIGNIFICANT CHANGE UP (ref 32–36)
MCHC RBC-ENTMCNC: 33.4 GM/DL — SIGNIFICANT CHANGE UP (ref 32–36)
MCHC RBC-ENTMCNC: 33.5 GM/DL — SIGNIFICANT CHANGE UP (ref 32–36)
MCHC RBC-ENTMCNC: 34.1 GM/DL — SIGNIFICANT CHANGE UP (ref 32–36)
MCV RBC AUTO: 88.9 FL — SIGNIFICANT CHANGE UP (ref 80–100)
MCV RBC AUTO: 89.1 FL — SIGNIFICANT CHANGE UP (ref 80–100)
MCV RBC AUTO: 89.2 FL — SIGNIFICANT CHANGE UP (ref 80–100)
MCV RBC AUTO: 89.5 FL — SIGNIFICANT CHANGE UP (ref 80–100)
PHOSPHATE SERPL-MCNC: 3.2 MG/DL — SIGNIFICANT CHANGE UP (ref 2.5–4.5)
PLATELET # BLD AUTO: 186 K/UL — SIGNIFICANT CHANGE UP (ref 150–400)
PLATELET # BLD AUTO: 214 K/UL — SIGNIFICANT CHANGE UP (ref 150–400)
PLATELET # BLD AUTO: 237 K/UL — SIGNIFICANT CHANGE UP (ref 150–400)
PLATELET # BLD AUTO: SIGNIFICANT CHANGE UP K/UL (ref 150–400)
POTASSIUM SERPL-MCNC: 4.4 MMOL/L — SIGNIFICANT CHANGE UP (ref 3.5–5.3)
POTASSIUM SERPL-SCNC: 4.4 MMOL/L — SIGNIFICANT CHANGE UP (ref 3.5–5.3)
PROTHROM AB SERPL-ACNC: 12.9 SEC — HIGH (ref 9.8–12.7)
RBC # BLD: 2.5 M/UL — LOW (ref 3.8–5.2)
RBC # BLD: 2.53 M/UL — LOW (ref 3.8–5.2)
RBC # BLD: 2.61 M/UL — LOW (ref 3.8–5.2)
RBC # BLD: 2.85 M/UL — LOW (ref 3.8–5.2)
RBC # FLD: 14.8 % — HIGH (ref 10.3–14.5)
RBC # FLD: 15.5 % — HIGH (ref 10.3–14.5)
RBC # FLD: 15.7 % — HIGH (ref 10.3–14.5)
RBC # FLD: 15.7 % — HIGH (ref 10.3–14.5)
SODIUM SERPL-SCNC: 137 MMOL/L — SIGNIFICANT CHANGE UP (ref 135–145)
WBC # BLD: 12.4 K/UL — HIGH (ref 3.8–10.5)
WBC # BLD: 15.6 K/UL — HIGH (ref 3.8–10.5)
WBC # BLD: 20 K/UL — HIGH (ref 3.8–10.5)
WBC # BLD: 22 K/UL — HIGH (ref 3.8–10.5)
WBC # FLD AUTO: 12.4 K/UL — HIGH (ref 3.8–10.5)
WBC # FLD AUTO: 15.6 K/UL — HIGH (ref 3.8–10.5)
WBC # FLD AUTO: 20 K/UL — HIGH (ref 3.8–10.5)
WBC # FLD AUTO: 22 K/UL — HIGH (ref 3.8–10.5)

## 2018-08-06 PROCEDURE — 99291 CRITICAL CARE FIRST HOUR: CPT

## 2018-08-06 PROCEDURE — 71045 X-RAY EXAM CHEST 1 VIEW: CPT | Mod: 26

## 2018-08-06 RX ORDER — LANOLIN ALCOHOL/MO/W.PET/CERES
3 CREAM (GRAM) TOPICAL
Qty: 0 | Refills: 0 | Status: DISCONTINUED | OUTPATIENT
Start: 2018-08-06 | End: 2018-08-10

## 2018-08-06 RX ORDER — ACETAMINOPHEN 500 MG
975 TABLET ORAL EVERY 6 HOURS
Qty: 0 | Refills: 0 | Status: DISCONTINUED | OUTPATIENT
Start: 2018-08-06 | End: 2018-08-10

## 2018-08-06 RX ORDER — MIRTAZAPINE 45 MG/1
45 TABLET, ORALLY DISINTEGRATING ORAL
Qty: 0 | Refills: 0 | Status: DISCONTINUED | OUTPATIENT
Start: 2018-08-06 | End: 2018-08-10

## 2018-08-06 RX ORDER — MAGNESIUM SULFATE 500 MG/ML
2 VIAL (ML) INJECTION ONCE
Qty: 0 | Refills: 0 | Status: COMPLETED | OUTPATIENT
Start: 2018-08-06 | End: 2018-08-06

## 2018-08-06 RX ORDER — FAMOTIDINE 10 MG/ML
20 INJECTION INTRAVENOUS
Qty: 0 | Refills: 0 | Status: DISCONTINUED | OUTPATIENT
Start: 2018-08-06 | End: 2018-08-10

## 2018-08-06 RX ORDER — OXYCODONE HYDROCHLORIDE 5 MG/1
5 TABLET ORAL EVERY 4 HOURS
Qty: 0 | Refills: 0 | Status: DISCONTINUED | OUTPATIENT
Start: 2018-08-06 | End: 2018-08-10

## 2018-08-06 RX ORDER — SENNA PLUS 8.6 MG/1
2 TABLET ORAL AT BEDTIME
Qty: 0 | Refills: 0 | Status: DISCONTINUED | OUTPATIENT
Start: 2018-08-06 | End: 2018-08-07

## 2018-08-06 RX ORDER — SERTRALINE 25 MG/1
50 TABLET, FILM COATED ORAL DAILY
Qty: 0 | Refills: 0 | Status: DISCONTINUED | OUTPATIENT
Start: 2018-08-06 | End: 2018-08-10

## 2018-08-06 RX ORDER — HYDROMORPHONE HYDROCHLORIDE 2 MG/ML
0.5 INJECTION INTRAMUSCULAR; INTRAVENOUS; SUBCUTANEOUS EVERY 4 HOURS
Qty: 0 | Refills: 0 | Status: DISCONTINUED | OUTPATIENT
Start: 2018-08-06 | End: 2018-08-12

## 2018-08-06 RX ORDER — DOCUSATE SODIUM 100 MG
100 CAPSULE ORAL THREE TIMES A DAY
Qty: 0 | Refills: 0 | Status: DISCONTINUED | OUTPATIENT
Start: 2018-08-06 | End: 2018-08-07

## 2018-08-06 RX ORDER — QUETIAPINE FUMARATE 200 MG/1
50 TABLET, FILM COATED ORAL
Qty: 0 | Refills: 0 | Status: DISCONTINUED | OUTPATIENT
Start: 2018-08-06 | End: 2018-08-10

## 2018-08-06 RX ORDER — CALCIUM GLUCONATE 100 MG/ML
2 VIAL (ML) INTRAVENOUS ONCE
Qty: 0 | Refills: 0 | Status: COMPLETED | OUTPATIENT
Start: 2018-08-06 | End: 2018-08-06

## 2018-08-06 RX ADMIN — Medication 0.5 MILLIGRAM(S): at 05:27

## 2018-08-06 RX ADMIN — CEFTAROLINE FOSAMIL 50 MILLIGRAM(S): 600 POWDER, FOR SOLUTION INTRAVENOUS at 05:05

## 2018-08-06 RX ADMIN — CHLORHEXIDINE GLUCONATE 15 MILLILITER(S): 213 SOLUTION TOPICAL at 13:05

## 2018-08-06 RX ADMIN — Medication 6: at 02:41

## 2018-08-06 RX ADMIN — DEXMEDETOMIDINE HYDROCHLORIDE IN 0.9% SODIUM CHLORIDE 0.5 MICROGRAM(S)/KG/HR: 4 INJECTION INTRAVENOUS at 08:58

## 2018-08-06 RX ADMIN — DEXMEDETOMIDINE HYDROCHLORIDE IN 0.9% SODIUM CHLORIDE 0.5 MICROGRAM(S)/KG/HR: 4 INJECTION INTRAVENOUS at 02:40

## 2018-08-06 RX ADMIN — CHLORHEXIDINE GLUCONATE 1 APPLICATION(S): 213 SOLUTION TOPICAL at 05:05

## 2018-08-06 RX ADMIN — Medication 1 MILLIGRAM(S): at 05:03

## 2018-08-06 RX ADMIN — HYDROMORPHONE HYDROCHLORIDE 0.5 MILLIGRAM(S): 2 INJECTION INTRAMUSCULAR; INTRAVENOUS; SUBCUTANEOUS at 22:35

## 2018-08-06 RX ADMIN — Medication 3 MILLILITER(S): at 05:27

## 2018-08-06 RX ADMIN — CEFTAROLINE FOSAMIL 50 MILLIGRAM(S): 600 POWDER, FOR SOLUTION INTRAVENOUS at 17:20

## 2018-08-06 RX ADMIN — FAMOTIDINE 20 MILLIGRAM(S): 10 INJECTION INTRAVENOUS at 05:09

## 2018-08-06 RX ADMIN — LIDOCAINE 1 PATCH: 4 CREAM TOPICAL at 09:10

## 2018-08-06 RX ADMIN — LIDOCAINE 1 PATCH: 4 CREAM TOPICAL at 22:28

## 2018-08-06 RX ADMIN — DIPHENHYDRAMINE HYDROCHLORIDE AND LIDOCAINE HYDROCHLORIDE AND ALUMINUM HYDROXIDE AND MAGNESIUM HYDRO 5 MILLILITER(S): KIT at 11:04

## 2018-08-06 RX ADMIN — Medication 3 MILLILITER(S): at 17:51

## 2018-08-06 RX ADMIN — Medication 3 MILLILITER(S): at 00:03

## 2018-08-06 RX ADMIN — Medication 200 GRAM(S): at 04:26

## 2018-08-06 RX ADMIN — HYDROMORPHONE HYDROCHLORIDE 0.5 MILLIGRAM(S): 2 INJECTION INTRAMUSCULAR; INTRAVENOUS; SUBCUTANEOUS at 06:15

## 2018-08-06 RX ADMIN — DEXMEDETOMIDINE HYDROCHLORIDE IN 0.9% SODIUM CHLORIDE 0.5 MICROGRAM(S)/KG/HR: 4 INJECTION INTRAVENOUS at 05:05

## 2018-08-06 RX ADMIN — HYDROMORPHONE HYDROCHLORIDE 0.5 MILLIGRAM(S): 2 INJECTION INTRAMUSCULAR; INTRAVENOUS; SUBCUTANEOUS at 02:51

## 2018-08-06 RX ADMIN — DAPTOMYCIN 120 MILLIGRAM(S): 500 INJECTION, POWDER, LYOPHILIZED, FOR SOLUTION INTRAVENOUS at 13:17

## 2018-08-06 RX ADMIN — Medication 10 MILLIGRAM(S): at 05:09

## 2018-08-06 RX ADMIN — Medication 0.5 MILLIGRAM(S): at 17:49

## 2018-08-06 RX ADMIN — Medication 2: at 09:17

## 2018-08-06 RX ADMIN — Medication 3 MILLILITER(S): at 11:21

## 2018-08-06 RX ADMIN — Medication 50 GRAM(S): at 03:39

## 2018-08-06 RX ADMIN — HYDROMORPHONE HYDROCHLORIDE 0.5 MILLIGRAM(S): 2 INJECTION INTRAMUSCULAR; INTRAVENOUS; SUBCUTANEOUS at 03:06

## 2018-08-06 RX ADMIN — SERTRALINE 50 MILLIGRAM(S): 25 TABLET, FILM COATED ORAL at 11:04

## 2018-08-06 RX ADMIN — ENOXAPARIN SODIUM 40 MILLIGRAM(S): 100 INJECTION SUBCUTANEOUS at 11:04

## 2018-08-06 RX ADMIN — CHLORHEXIDINE GLUCONATE 15 MILLILITER(S): 213 SOLUTION TOPICAL at 05:05

## 2018-08-06 RX ADMIN — Medication 100 MILLIGRAM(S): at 05:05

## 2018-08-06 RX ADMIN — HYDROMORPHONE HYDROCHLORIDE 0.5 MILLIGRAM(S): 2 INJECTION INTRAMUSCULAR; INTRAVENOUS; SUBCUTANEOUS at 06:30

## 2018-08-06 RX ADMIN — HYDROMORPHONE HYDROCHLORIDE 0.5 MILLIGRAM(S): 2 INJECTION INTRAMUSCULAR; INTRAVENOUS; SUBCUTANEOUS at 22:50

## 2018-08-06 NOTE — PROGRESS NOTE ADULT - SUBJECTIVE AND OBJECTIVE BOX
CC: f/u for mrsa bacteremia and hip infection     Patient reports nothing on high rey not talking    REVIEW OF SYSTEMS:  All other review of systems cannot get    Antimicrobials Day #  :  Ceftaroline Fosamil IVPB 600 milliGRAM(s) IV Intermittent every 12 hours  DAPTOmycin IVPB 500 milliGRAM(s) IV Intermittent every 24 hours    Other Medications Reviewed    T(F): 99.1 (08-06-18 @ 15:00), Max: 100.2 (08-05-18 @ 23:00)  HR: 100 (08-06-18 @ 18:00)  BP: 150/78 (08-06-18 @ 18:00)  RR: 30 (08-06-18 @ 18:00)  SpO2: 100% (08-06-18 @ 18:00)  Wt(kg): --    PHYSICAL EXAM:  General: lethargic with high flow acute distress  Eyes:  anicteric, no conjunctival injection, no discharge  Oropharynx: no lesions or injection 	  Neck: supple, without adenopathy  Lungs: course bs  to auscultation  Heart: s1s2  Abdomen: soft, nondistended, nontender, without mass or organomegaly  Skin: no lesions  Extremities: right hip with vac  Neurologic: lethargic    LAB RESULTS:                        8.6    22.0  )-----------( 237      ( 06 Aug 2018 12:31 )             25.3     08-06    137  |  106  |  21  ----------------------------<  237<H>  4.4   |  19<L>  |  0.88    Ca    6.6<L>      06 Aug 2018 02:32  Phos  3.2     08-06  Mg     1.8     08-06          MICROBIOLOGY:  RECENT CULTURES:  08-05 @ 06:16 .Blood Blood-Peripheral     Growth in aerobic and anaerobic bottles: Gram Positive Cocci in Clusters    Growth in aerobic and anaerobic bottles: Gram Positive Cocci in Clusters    08-04 @ 07:29 .Tissue Other, sxiorium right hip     No growth    Moderate polymorphonuclear leukocytes seen per low power field  Few Gram Positive Cocci seen per oil power field    08-04 @ 07:00 .Blood Blood     No growth to date.          RADIOLOGY REVIEWED:  < from: Transesophageal Echocardiogram (07.30.18 @ 16:42) >  Conclusions:  1. Tethered , mildly calcified mitral valve leaflets with  normal opening. Mild-moderate mitral regurgitation.  2. Sclerotic aortic valve leaflets with normal opening.  Mild aortic regurgitation.  3. Severe global left ventricular systolic dysfunction.  4. Right ventricular enlargement with decreased right  ventricular systolic function.  5. Thickened tricuspid valve. Mild-moderate tricuspid  regurgitation.  6. Color Doppler demonstrates evidence of a patent foramen  ovale.  7. No evidence of valvular vegetation is seen.  ------------------------------------------------------------------------    < end of copied text >    Assessment:  Patient with recent right hip hemiarthroplasty  , had another fall, left hip fx but found septic with mrsa bacteremia and right hip collection. Bacteremia  did not clear with just washout and poly change so just had leisa and spacer a couple of days ago. She is now on high flow Oxygen but not looking too well  Plan: continue ceftaroline and daptomycin  follow up latest cultures  favor change central line in case seeded if it has been in for more than a few days  if bacteremia persists will need another ct or pelvis and even interval nancy  supportive care per sicu  monitor cpk

## 2018-08-06 NOTE — PROGRESS NOTE ADULT - SUBJECTIVE AND OBJECTIVE BOX
Last 24hrs: Failed SBT secondary to tachypnea.  Transfused 1 unit PRBC for Hct 20.8, post-transfusion Hct 24.9.  Started on Insulin gtt for hyperglycemia.  Weaned off Phenylephrine gtt during day.  Blood cultures from 8/4 no growth to date.     This am patient seen and examined. still intubated, responsive to pain on exam.     Allergies    ASA; dye contrast (Anaphylaxis)  aspirin (Short breath)  divalproex sodium (Other (Unknown))  Haldol (Other (Unknown))  penicillin (Short breath; Rash)  sulfa drugs (Short breath; Rash)  Xanax (Other (Unknown))    Intolerances                            7.4    15.6  )-----------( 186      ( 06 Aug 2018 03:21 )             22.3     06 Aug 2018 02:32    137    |  106    |  21     ----------------------------<  237    4.4     |  19     |  0.88     Ca    6.6        06 Aug 2018 02:32  Phos  3.2       06 Aug 2018 02:32  Mg     1.8       06 Aug 2018 02:32      PT/INR - ( 06 Aug 2018 03:21 )   PT: 12.9 sec;   INR: 1.19 ratio         PTT - ( 06 Aug 2018 03:21 )  PTT:39.2 sec  Vital Signs Last 24 Hrs  T(C): 37.3 (08-06-18 @ 03:00), Max: 37.9 (08-05-18 @ 23:00)  T(F): 99.1 (08-06-18 @ 03:00), Max: 100.2 (08-05-18 @ 23:00)  HR: 110 (08-06-18 @ 06:00) (85 - 122)  BP: 132/60 (08-06-18 @ 06:00) (86/43 - 189/76)  BP(mean): 86 (08-06-18 @ 06:00) (62 - 123)  RR: 16 (08-06-18 @ 06:00) (14 - 34)  SpO2: 100% (08-06-18 @ 06:00) (99% - 100%)    Physical Exam  Gen: NAD  RLE:   incisional vac in place and with good suction  +HMV x 2 80/210, 60/250  gross moving all toes and DF/PF ankle  responds to pain  Dp pulse intact, foot warm well perfused  Compartments soft    A/P:  68y Female sp R HIP PJI explant and placement of cement spacer, ORIF distal femur  Pain control  DVT ppx, lovenox  FU blood cultures 8/4 so for negative  abx per ID  NWB LLE   PT/OT  Cont ivac  Cont HMV drains  Foot Flat Weight bearing RLE  wean to extubate as tolerated  care per SICU

## 2018-08-06 NOTE — PROGRESS NOTE ADULT - SUBJECTIVE AND OBJECTIVE BOX
68 year old female presenting with symptomatic hypoglycemia and septic shock secondary to an infected right hip s/p right hip I&D with hemiarthroplasty revision (7/27) with persistent MRSA bacteremia.  Pt now s/p RTOR on 8/3 for removal of infected hardware and placement of antibiotic spacer, ORIF Right femur.  Pt remained intubated post-operatively.  8/4 blood cultures no growth to date.     24 HOUR EVENTS: Failed SBT secondary to tachypnea.  Transfused 1 unit PRBC for Hct 20.8, post-transfusion Hct 24.9.  Started on Insulin gtt for hyperglycemia.  Weaned off Phenylephrine gtt during day.  Blood cultures from 8/4 no growth to date.     SUBJECTIVE/ROS:  [ ] A ten-point review of systems was otherwise negative except as noted.  [x ] Due to altered mental status/intubation, subjective information were not able to be obtained from the patient. History was obtained, to the extent possible, from review of the chart and collateral sources of information.      NEURO  RASS: -1    GCS:     CAM ICU:  Exam:   Meds: dexmedetomidine Infusion 0.04 MICROgram(s)/kG/Hr IV Continuous <Continuous>  HYDROmorphone  Injectable 0.5 milliGRAM(s) IV Push every 3 hours PRN Severe Pain (7 - 10)  LORazepam   Injectable 1 milliGRAM(s) IV Push every 4 hours PRN Agitation  mirtazapine 45 milliGRAM(s) Oral <User Schedule>  QUEtiapine 50 milliGRAM(s) Oral <User Schedule>  sertraline 50 milliGRAM(s) Oral daily    [x] Adequacy of sedation and pain control has been assessed and adjusted      RESPIRATORY  RR: 16 (08-06-18 @ 03:00) (14 - 35)  SpO2: 100% (08-06-18 @ 03:00) (99% - 100%)  Wt(kg): --  Exam: unlabored, clear to auscultation bilaterally  Mechanical Ventilation: Mode: PRVC, RR (machine): 16, RR (patient): 18, TV (machine): 400, FiO2: 30, PEEP: 5, ITime: 0.9, MAP: 8, PIP: 12  ABG - ( 06 Aug 2018 02:26 )  pH: 7.51  /  pCO2: 27    /  pO2: 94    / HCO3: 21    / Base Excess: -1.0  /  SaO2: 99      Lactate: x        [x ] Extubation Readiness Assessed  Meds: ALBUTerol/ipratropium for Nebulization 3 milliLiter(s) Nebulizer every 6 hours  buDESOnide   0.5 milliGRAM(s) Respule 0.5 milliGRAM(s) Inhalation every 12 hours      CARDIOVASCULAR  HR: 100 (08-06-18 @ 03:00) (85 - 122)  BP: 104/51 (08-06-18 @ 03:00) (86/43 - 189/76)  BP(mean): 74 (08-06-18 @ 03:00) (62 - 123)  ABP: --  ABP(mean): --  Wt(kg): --  CVP(cm H2O): --      Exam:  Cardiac Rhythm:  Perfusion     [x ]Adequate   [ ]Inadequate  Mentation   [x ]Normal       [ ]Reduced  Extremities  [ x]Warm         [ ]Cool  Volume Status [ ]Hypervolemic [x ]Euvolemic [ ]Hypovolemic  Meds:       GI/NUTRITION  Exam: Soft, non-tender, non-distended.   Diet: Jevity @ 45mL/hr.  Meds: docusate sodium Liquid 100 milliGRAM(s) Enteral Tube three times a day  famotidine Injectable 20 milliGRAM(s) IV Push two times a day  senna Syrup 5 milliLiter(s) Oral at bedtime      GENITOURINARY  I&O's Detail    08-04 @ 07:01  -  08-05 @ 07:00  --------------------------------------------------------  IN:    dexmedetomidine Infusion: 371.7 mL    dextrose 5% + lactated ringers.: 1725 mL    IV PiggyBack: 100 mL    ns in tub fed  [id number]: 910 mL    phenylephrine   Infusion: 160.8 mL  Total IN: 3267.5 mL    OUT:    Accordian: 130 mL    Accordian: 330 mL    Indwelling Catheter - Urethral: 950 mL    Nasoenteral Tube: 100 mL  Total OUT: 1510 mL    Total NET: 1757.5 mL      08-05 @ 07:01  -  08-06 @ 03:39  --------------------------------------------------------  IN:    dexmedetomidine Infusion: 220.4 mL    dextrose 5% + lactated ringers.: 225 mL    IV PiggyBack: 500 mL    ns in tub fed  [id number]: 810 mL    Packed Red Blood Cells: 320 mL    phenylephrine   Infusion: 3.8 mL    Solution: 200 mL  Total IN: 2279.2 mL    OUT:    Accordian: 160 mL    Accordian: 220 mL    Indwelling Catheter - Urethral: 1190 mL  Total OUT: 1570 mL    Total NET: 709.2 mL      08-06    137  |  106  |  21  ----------------------------<  237<H>  4.4   |  19<L>  |  0.88    Ca    6.6<L>      06 Aug 2018 02:32  Phos  3.2     08-06  Mg     1.8     08-06      [x ] Blackman catheter, indication: UOP monitoring  Meds: calcium gluconate IVPB 2 Gram(s) IV Intermittent once  magnesium sulfate  IVPB 2 Gram(s) IV Intermittent once        HEMATOLOGIC  Meds: enoxaparin Injectable 40 milliGRAM(s) SubCutaneous daily    [x] VTE Prophylaxis                        7.4    15.6  )-----------( 186      ( 06 Aug 2018 03:21 )             22.3     PT/INR - ( 04 Aug 2018 04:10 )   PT: 14.4 sec;   INR: 1.33 ratio         PTT - ( 04 Aug 2018 04:10 )  PTT:28.8 sec  Transfusion     [ ] PRBC   [ ] Platelets   [ ] FFP   [ ] Cryoprecipitate      INFECTIOUS DISEASES  T(C): 37.3 (08-06-18 @ 03:00), Max: 37.9 (08-05-18 @ 23:00)  Wt(kg): --  WBC Count: 15.6 K/uL (08-06 @ 03:21)  WBC Count: See Note (08-06 @ 02:32)  WBC Count: 22.8 K/uL (08-05 @ 12:12)  WBC Count: 20.2 K/uL (08-05 @ 04:30)    Recent Cultures:  Specimen Source: .Tissue Other, sxiorium right hip, 08-04 @ 07:29; Results   No growth; Gram Stain:   Moderate polymorphonuclear leukocytes seen per low power field  Few Gram Positive Cocci seen per oil power field; Organism: --  Specimen Source: .Blood Blood, 08-04 @ 07:00; Results   No growth to date.; Gram Stain: --; Organism: --  Specimen Source: .Blood Blood-Peripheral, 08-01 @ 08:47; Results   Growth in anaerobic bottle: Methicillin resistant Staphylococcus aureus  See previous culture [id number]; Gram Stain:   Growth in anaerobic bottle: Gram Positive Cocci in Clusters; Organism: --  Specimen Source: .Blood Blood-Peripheral, 07-31 @ 08:35; Results   Growth in aerobic and anaerobic bottles: Methicillin resistant  Staphylococcus aureus  See previous culture [id number]; Gram Stain:   Growth in anaerobic bottle: Gram Positive Cocci in Clusters  Growth in aerobic bottle: Gram Positive Cocci in Clusters; Organism: --  Specimen Source: .Blood Blood-Venous, 07-30 @ 05:44; Results   Growth in aerobic bottle: Methicillin resistant Staphylococcus aureus  Two aerobic bottles received only.; Gram Stain:   Growth in aerobic bottle: Gram Positive Cocci in Clusters  Growth in aerobic bottle: Gram Positive Cocci in Clusters; Organism: Methicillin resistant Staphylococcus aureus    Meds: Ceftaroline Fosamil IVPB 600 milliGRAM(s) IV Intermittent every 12 hours  DAPTOmycin IVPB 500 milliGRAM(s) IV Intermittent every 24 hours      ENDOCRINE  Capillary Blood Glucose    Meds: insulin lispro (HumaLOG) corrective regimen sliding scale   SubCutaneous every 4 hours  predniSONE   Tablet 10 milliGRAM(s) Oral every 24 hours      ACCESS DEVICES:  [ ] Peripheral IV  [x ] Central Venous Line	[ ] R	[x ] L	[x ] IJ	[ ] Fem	[ ] SC	Placed:   [ ] Arterial Line		[ ] R	[ ] L	[ ] Fem	[ ] Rad	[ ] Ax	Placed:   [ ] PICC:					[ ] Mediport  [x ] Urinary Catheter, Date Placed:   [x ] Necessity of urinary, arterial, and venous catheters discussed    OTHER MEDICATIONS:  chlorhexidine 0.12% Liquid 15 milliLiter(s) Swish and Spit <User Schedule>  chlorhexidine 4% Liquid 1 Application(s) Topical <User Schedule>  FIRST- Mouthwash  BLM 5 milliLiter(s) Swish and Spit daily  lidocaine   Patch 1 Patch Transdermal every 24 hours      CODE STATUS: Full code    IMAGING:

## 2018-08-06 NOTE — PROGRESS NOTE ADULT - ATTENDING COMMENTS
I agree with the above note and have personally seen and examined this patient. All pertinent films have been reviewed. Please refer to clinical documentation of the history, physical examinations, data summary, and both assessment and plan as documented above and with which I agree.    extubated, not interactive on high flow nasal oxygen  rle  ivac intact  2x hv w/ss output  unable to obtain neuro exam 2/2 cooperation  2+ dp    continue drains, change to gravity drainage  continue ivac (will stay in place x 7 days)  apply ace wrap to rle (ortho to apply)  continue to monitor cbc, has required postop transfusions for acute blood loss anemia  cont iv abx  bcx positive, presume mrsa  synovial culture positive mrsa from OR    I met with the patients  and one of the patients daughters today. We again reviewed the surgery as well as her progress thus far since the surgery. They were appreciative for the care of all of the surgical teams.    Kartik Shaffer MD  Attending Orthopedic Surgeon I agree with the above note and have personally seen and examined this patient. All pertinent films have been reviewed. Please refer to clinical documentation of the history, physical examinations, data summary, and both assessment and plan as documented above and with which I agree.    extubated, not interactive on high flow nasal oxygen  rle  ivac intact  2x hv w/ss output  unable to obtain neuro exam 2/2 cooperation  2+ dp    continue drains, change to gravity drainage  continue ivac (will stay in place x 7 days)  apply ace wrap to rle (ortho to apply)  continue to monitor cbc, has required postop transfusions for acute blood loss anemia  cont iv abx  bcx positive, presume mrsa  synovial culture positive mrsa from OR    I met with the patients  and one of the patients daughters today. We again reviewed the surgery as well as her progress thus far since the surgery. They were appreciative for the care of all of the surgical teams.    Kartik Shaffer MD  Attending Orthopedic Surgeon    Agree with above exam and plan. Agree with Dr. Shaffer.    Dr. Rustam Rey

## 2018-08-06 NOTE — PROGRESS NOTE ADULT - SUBJECTIVE AND OBJECTIVE BOX
---___---___---___---___---___---___ ---___---___---___---___---___---___---___---___---___---                    <<<  M E D I C A L   A T T E N D I N G    F O L L O W    U P   N O T E  >>>    extubated now in icu doing well   ---___---___---___---___---___---      <<<  MEDICATIONS:  >>>    MEDICATIONS  (STANDING):  ALBUTerol/ipratropium for Nebulization 3 milliLiter(s) Nebulizer every 6 hours  buDESOnide   0.5 milliGRAM(s) Respule 0.5 milliGRAM(s) Inhalation every 12 hours  Ceftaroline Fosamil IVPB 600 milliGRAM(s) IV Intermittent every 12 hours  chlorhexidine 0.12% Liquid 15 milliLiter(s) Swish and Spit <User Schedule>  chlorhexidine 4% Liquid 1 Application(s) Topical <User Schedule>  DAPTOmycin IVPB 500 milliGRAM(s) IV Intermittent every 24 hours  dexmedetomidine Infusion 0.04 MICROgram(s)/kG/Hr (0.504 mL/Hr) IV Continuous <Continuous>  docusate sodium 100 milliGRAM(s) Oral three times a day  enoxaparin Injectable 40 milliGRAM(s) SubCutaneous daily  famotidine    Tablet 20 milliGRAM(s) Oral two times a day  FIRST- Mouthwash  BLM 5 milliLiter(s) Swish and Spit daily  insulin lispro (HumaLOG) corrective regimen sliding scale   SubCutaneous every 4 hours  lidocaine   Patch 1 Patch Transdermal every 24 hours  mirtazapine 45 milliGRAM(s) Oral <User Schedule>  predniSONE   Tablet 10 milliGRAM(s) Oral every 24 hours  QUEtiapine 50 milliGRAM(s) Oral <User Schedule>  senna 2 Tablet(s) Oral at bedtime  sertraline 50 milliGRAM(s) Oral daily      MEDICATIONS  (PRN):  acetaminophen   Tablet. 975 milliGRAM(s) Oral every 6 hours PRN Mild Pain (1 - 3)  HYDROmorphone  Injectable 0.5 milliGRAM(s) IV Push every 4 hours PRN Severe Breakthrough Pain  LORazepam   Injectable 1 milliGRAM(s) IV Push every 4 hours PRN Agitation  oxyCODONE    IR 5 milliGRAM(s) Oral every 4 hours PRN Severe Pain (7 - 10)       ---___---___---___---___---___---     <<<REVIEW OF SYSTEM: >>>    GEN: no fever, no chills, no pain  RESP: no SOB, no cough, no sputum  CVS: no chest pain, no palpitations, no edema  GI: no abdominal pain, no nausea, no vomiting, no constipation, no diarrhea  : no dysurea, no frequency  NEURO: no headache, no dizziness  PSYCH: no depression, not anxious  Derm : no itching, no rash     ---___---___---___---___---___---          <<<  VITAL SIGNS: >>>    T(F): 99.1 (08-06-18 @ 15:00), Max: 100.2 (08-05-18 @ 23:00)  HR: 108 (08-06-18 @ 17:00) (87 - 121)  BP: 154/72 (08-06-18 @ 17:00) (101/50 - 179/71)  RR: 35 (08-06-18 @ 17:00) (13 - 36)  SpO2: 100% (08-06-18 @ 17:00) (99% - 100%)  Wt(kg): --  CAPILLARY BLOOD GLUCOSE      POCT Blood Glucose.: 139 mg/dL (06 Aug 2018 13:41)    I&O's Summary    05 Aug 2018 07:01  -  06 Aug 2018 07:00  --------------------------------------------------------  IN: 2652 mL / OUT: 1840 mL / NET: 812 mL    06 Aug 2018 07:01  -  06 Aug 2018 17:07  --------------------------------------------------------  IN: 495 mL / OUT: 745 mL / NET: -250 mL         ---___---___---___---___---___---                       PHYSICAL EXAM:    GEN: A&O X 2, NAD , comfortable  HEENT: NCAT, PERRL, MMM, no scleral icterus, hearing intact  NECK: Supple, No JVD  CVS: S1S2 , regular , No M/R/G appreciated  PULM: CTA B/L,  no W/R/R appreciated tachypneic  ABD.: soft. non tender, non distended,  bowel sounds present  Extrem: intact pulses ,  edema noted  Derm: No rash or ecchymosis noted  PSYCH: normal mood, no depression, not anxious     ---___---___---___---___---___---     <<<  LAB AND IMAGING: >>>                          8.6    22.0  )-----------( 237      ( 06 Aug 2018 12:31 )             25.3               08-06    137  |  106  |  21  ----------------------------<  237<H>  4.4   |  19<L>  |  0.88    Ca    6.6<L>      06 Aug 2018 02:32  Phos  3.2     08-06  Mg     1.8     08-06      PT/INR - ( 06 Aug 2018 03:21 )   PT: 12.9 sec;   INR: 1.19 ratio         PTT - ( 06 Aug 2018 03:21 )  PTT:39.2 sec                   ABG - ( 06 Aug 2018 10:34 )  pH, Arterial: 7.48  pH, Blood: x     /  pCO2: 28    /  pO2: 150   / HCO3: 21    / Base Excess: -1.6  /  SaO2: 100                     [All pertinent / recent available Imaging reports and other labs reviewed]     ---___---___---___---___---___---___ ---___---___---___---___---           <<<  A S S E S S M E N T   A N YE   P L A N :  >>>          -GI/DVT Prophylaxis.    --------------------------------------------  Case discussed with  mr roque at the bedside  Education given on     >>______________________<<      Deniz Bolden .         phone   [phone]

## 2018-08-06 NOTE — PROGRESS NOTE ADULT - ASSESSMENT
ASSESSMENT:  68 year old female presenting with symptomatic hypoglycemia and septic shock secondary to an infected right hip s/p right hip I&D with hemiarthroplasty revision (7/27) with persistent MRSA bacteremia.  Pt now s/p RTOR on 8/3 for removal of infected hardware and placement of antibiotic spacer, ORIF Right femur.  Pt remained intubated post-operatively.  8/4 blood cultures no growth to date.     PLAN:    Neuro: acute post-op pain, sedation, anxiety, depression, dementia  - Monitor mental status.  - Sedation with Precedex while intubated. Ativan PRN breakthrough agitation.  - Dilaudid PRN pain.  - Home Zoloft, Seroquel, and Remeron.    Resp: acute respiratory distress/failure, respiratory alkalosis, h/o asthma  - Mechanical ventilation for acute respiratory distress/failure.  - Pulmicort and Duoneb for asthma.  - Monitor pulse ox    CV: post-op hypotension resolved  - Hemodynamic monitoring    GI: no acute issues  - NPO with Jevity at goal of 45 mL/hr.  - Protonix for GERD.  - Bowel regimen with Colace & senna.    Renal: CKD stage 2, hyponatremia (improved)  - Monitor I&Os.  - Monitor electrolytes and replete as necessary.    Heme: no acute issues  - Lovenox for VTE prophylaxis.    ID: MRSA bacteremia, infected right hip s/p I&D  - 8/4 Blood cultures no growth to date  - Daptomycin with ceftaroline for MRSA bacteremia.  - ID following     Endo: hypoglycemia (resolved), hyperglycemia, adrenal insufficiency  - Prednisone 10 mg daily for asthma.  - Monitor fingersticks q4hrs for hypoglycemia.    Disposition:  - Full code.  - Will remain in SICU for respiratory and hemodynamic monitoring.    Dionna Sung PA-C #[id number] ASSESSMENT:  68 year old female presenting with symptomatic hypoglycemia and septic shock secondary to an infected right hip s/p right hip I&D with hemiarthroplasty revision (7/27) with persistent MRSA bacteremia.  Pt now s/p RTOR on 8/3 for removal of infected hardware and placement of antibiotic spacer, ORIF Right femur.  Pt remained intubated post-operatively.  8/4 blood cultures no growth to date.     PLAN:    Neuro: acute post-op pain, sedation, anxiety, depression, dementia  - Monitor mental status.  - Sedation with Precedex while intubated. Ativan PRN breakthrough agitation.  - Dilaudid PRN pain.  - Home Zoloft, Seroquel, and Remeron.    Resp: acute respiratory distress/failure, respiratory alkalosis, h/o asthma  - Mechanical ventilation for acute respiratory distress/failure.  - Pulmicort and Duoneb for asthma.  - Monitor pulse ox    CV: post-op hypotension resolved  - Hemodynamic monitoring    GI: no acute issues  - NPO with Jevity at goal of 45 mL/hr.  - Protonix for GERD.  - Bowel regimen with Colace & senna.    Renal: CKD stage 2, hyponatremia (improved)  - Monitor I&Os.  - Monitor electrolytes and replete as necessary.    Heme: no acute issues, s/p 1 unit PRBC 8/5  - Lovenox for VTE prophylaxis.  - f/u repeat CBC    ID: MRSA bacteremia, infected right hip s/p I&D  - 8/4 Blood cultures no growth to date  - Daptomycin with ceftaroline for MRSA bacteremia.  - ID following     Endo: hypoglycemia (resolved), hyperglycemia, adrenal insufficiency  - Prednisone 10 mg daily for asthma.  - Monitor fingersticks q4hrs for hypoglycemia.    Disposition:  - Full code.  - Will remain in SICU for respiratory and hemodynamic monitoring.    Dionna Sung PA-C #[id number]

## 2018-08-06 NOTE — PROVIDER CONTACT NOTE (OTHER) - ASSESSMENT
Pt on full suppport, sedated on precedex. Arouses and follows commands. Off yue since morning shift. Extremities warm and upper extremity fingertips mottled

## 2018-08-06 NOTE — PROGRESS NOTE ADULT - ATTENDING COMMENTS
I examined this patient on AM SICU rounds with the multidisciplinary team.  All relevant studies reviewed.  Agree with data recorded above.  Last 24 hours the patient did not tolerate a SBT yesterday but otherwise remained stable.  Issues:    Acute respiratory failure s/p surgery:  was re-intubated prior to the operation last week.  Has remained weak, with tachypnea and low tidal volumes during weaning trials.  Contributing factors include her underlying Alzheimer's which creates a difficult balance between adequate anxiolysis and oversedation.    We did do a successful SBT today - RSBI ~ 30-35 and extubated the patient.  So far - no immediate embarrassment.  Infection of a recently placed hip prosthesis - s/p hardware removal and placement of antibiotic spacers.   Acute blood loss anemia - Hct 25 this morning.  Expected s/p removal of orthopedic hardware (reoperative joint surgery) follow.  Leukocytosis - increasing over the past several days.  Looking for an unrecognized source of infection.  Repeat cultures sent.  No obvious new pulmonary infiltrate - on antibiotics for MRSA documented in joint and bacteremia.  Continue antibiotics.  Follow up cultures.  D/C central line.    Continue current critical care in SICU.  35 minutes of care rendered.

## 2018-08-07 ENCOUNTER — RX RENEWAL (OUTPATIENT)
Age: 68
End: 2018-08-07

## 2018-08-07 LAB
ANION GAP SERPL CALC-SCNC: 11 MMOL/L — SIGNIFICANT CHANGE UP (ref 5–17)
BUN SERPL-MCNC: 22 MG/DL — SIGNIFICANT CHANGE UP (ref 7–23)
CALCIUM SERPL-MCNC: 6.9 MG/DL — LOW (ref 8.4–10.5)
CHLORIDE SERPL-SCNC: 105 MMOL/L — SIGNIFICANT CHANGE UP (ref 96–108)
CO2 SERPL-SCNC: 19 MMOL/L — LOW (ref 22–31)
CREAT SERPL-MCNC: 0.98 MG/DL — SIGNIFICANT CHANGE UP (ref 0.5–1.3)
CULTURE RESULTS: SIGNIFICANT CHANGE UP
GAS PNL BLDA: SIGNIFICANT CHANGE UP
GLUCOSE SERPL-MCNC: 87 MG/DL — SIGNIFICANT CHANGE UP (ref 70–99)
HCT VFR BLD CALC: 22.9 % — LOW (ref 34.5–45)
HCT VFR BLD CALC: 24.2 % — LOW (ref 34.5–45)
HGB BLD-MCNC: 7.6 G/DL — LOW (ref 11.5–15.5)
HGB BLD-MCNC: 8.4 G/DL — LOW (ref 11.5–15.5)
MAGNESIUM SERPL-MCNC: 2 MG/DL — SIGNIFICANT CHANGE UP (ref 1.6–2.6)
MCHC RBC-ENTMCNC: 30.1 PG — SIGNIFICANT CHANGE UP (ref 27–34)
MCHC RBC-ENTMCNC: 30.8 PG — SIGNIFICANT CHANGE UP (ref 27–34)
MCHC RBC-ENTMCNC: 33.4 GM/DL — SIGNIFICANT CHANGE UP (ref 32–36)
MCHC RBC-ENTMCNC: 34.6 GM/DL — SIGNIFICANT CHANGE UP (ref 32–36)
MCV RBC AUTO: 89 FL — SIGNIFICANT CHANGE UP (ref 80–100)
MCV RBC AUTO: 90 FL — SIGNIFICANT CHANGE UP (ref 80–100)
PHOSPHATE SERPL-MCNC: 3.2 MG/DL — SIGNIFICANT CHANGE UP (ref 2.5–4.5)
PLATELET # BLD AUTO: 231 K/UL — SIGNIFICANT CHANGE UP (ref 150–400)
PLATELET # BLD AUTO: 269 K/UL — SIGNIFICANT CHANGE UP (ref 150–400)
POTASSIUM SERPL-MCNC: 4.3 MMOL/L — SIGNIFICANT CHANGE UP (ref 3.5–5.3)
POTASSIUM SERPL-SCNC: 4.3 MMOL/L — SIGNIFICANT CHANGE UP (ref 3.5–5.3)
RBC # BLD: 2.54 M/UL — LOW (ref 3.8–5.2)
RBC # BLD: 2.72 M/UL — LOW (ref 3.8–5.2)
RBC # FLD: 15.1 % — HIGH (ref 10.3–14.5)
RBC # FLD: 15.5 % — HIGH (ref 10.3–14.5)
SODIUM SERPL-SCNC: 135 MMOL/L — SIGNIFICANT CHANGE UP (ref 135–145)
SPECIMEN SOURCE: SIGNIFICANT CHANGE UP
WBC # BLD: 13 K/UL — HIGH (ref 3.8–10.5)
WBC # BLD: 14.4 K/UL — HIGH (ref 3.8–10.5)
WBC # FLD AUTO: 13 K/UL — HIGH (ref 3.8–10.5)
WBC # FLD AUTO: 14.4 K/UL — HIGH (ref 3.8–10.5)

## 2018-08-07 PROCEDURE — 99291 CRITICAL CARE FIRST HOUR: CPT

## 2018-08-07 PROCEDURE — 71045 X-RAY EXAM CHEST 1 VIEW: CPT | Mod: 26,77

## 2018-08-07 PROCEDURE — 71045 X-RAY EXAM CHEST 1 VIEW: CPT | Mod: 26,76

## 2018-08-07 RX ORDER — CALCIUM GLUCONATE 100 MG/ML
2 VIAL (ML) INTRAVENOUS ONCE
Qty: 0 | Refills: 0 | Status: COMPLETED | OUTPATIENT
Start: 2018-08-07 | End: 2018-08-07

## 2018-08-07 RX ORDER — DOCUSATE SODIUM 100 MG
100 CAPSULE ORAL THREE TIMES A DAY
Qty: 0 | Refills: 0 | Status: DISCONTINUED | OUTPATIENT
Start: 2018-08-07 | End: 2018-08-07

## 2018-08-07 RX ORDER — DEXTROSE 50 % IN WATER 50 %
50 SYRINGE (ML) INTRAVENOUS ONCE
Qty: 0 | Refills: 0 | Status: COMPLETED | OUTPATIENT
Start: 2018-08-07 | End: 2018-08-07

## 2018-08-07 RX ORDER — SODIUM CHLORIDE 9 MG/ML
1000 INJECTION, SOLUTION INTRAVENOUS
Qty: 0 | Refills: 0 | Status: DISCONTINUED | OUTPATIENT
Start: 2018-08-07 | End: 2018-08-07

## 2018-08-07 RX ORDER — MIRTAZAPINE 15 MG/1
15 TABLET, FILM COATED ORAL
Qty: 90 | Refills: 3 | Status: ACTIVE | COMMUNITY
Start: 2018-08-07 | End: 1900-01-01

## 2018-08-07 RX ORDER — DEXTROSE 50 % IN WATER 50 %
15 SYRINGE (ML) INTRAVENOUS ONCE
Qty: 0 | Refills: 0 | Status: COMPLETED | OUTPATIENT
Start: 2018-08-07 | End: 2018-08-07

## 2018-08-07 RX ADMIN — Medication 0.5 MILLIGRAM(S): at 05:40

## 2018-08-07 RX ADMIN — FAMOTIDINE 20 MILLIGRAM(S): 10 INJECTION INTRAVENOUS at 06:02

## 2018-08-07 RX ADMIN — QUETIAPINE FUMARATE 50 MILLIGRAM(S): 200 TABLET, FILM COATED ORAL at 00:57

## 2018-08-07 RX ADMIN — Medication 3 MILLILITER(S): at 00:19

## 2018-08-07 RX ADMIN — SODIUM CHLORIDE 30 MILLILITER(S): 9 INJECTION, SOLUTION INTRAVENOUS at 06:12

## 2018-08-07 RX ADMIN — SERTRALINE 50 MILLIGRAM(S): 25 TABLET, FILM COATED ORAL at 18:47

## 2018-08-07 RX ADMIN — CHLORHEXIDINE GLUCONATE 1 APPLICATION(S): 213 SOLUTION TOPICAL at 06:03

## 2018-08-07 RX ADMIN — Medication 100 MILLIGRAM(S): at 06:03

## 2018-08-07 RX ADMIN — MIRTAZAPINE 45 MILLIGRAM(S): 45 TABLET, ORALLY DISINTEGRATING ORAL at 21:49

## 2018-08-07 RX ADMIN — MIRTAZAPINE 45 MILLIGRAM(S): 45 TABLET, ORALLY DISINTEGRATING ORAL at 00:57

## 2018-08-07 RX ADMIN — Medication 3 MILLILITER(S): at 11:37

## 2018-08-07 RX ADMIN — OXYCODONE HYDROCHLORIDE 5 MILLIGRAM(S): 5 TABLET ORAL at 22:18

## 2018-08-07 RX ADMIN — LIDOCAINE 1 PATCH: 4 CREAM TOPICAL at 09:25

## 2018-08-07 RX ADMIN — DIPHENHYDRAMINE HYDROCHLORIDE AND LIDOCAINE HYDROCHLORIDE AND ALUMINUM HYDROXIDE AND MAGNESIUM HYDRO 5 MILLILITER(S): KIT at 12:08

## 2018-08-07 RX ADMIN — Medication 50 MILLILITER(S): at 18:47

## 2018-08-07 RX ADMIN — DEXMEDETOMIDINE HYDROCHLORIDE IN 0.9% SODIUM CHLORIDE 0.5 MICROGRAM(S)/KG/HR: 4 INJECTION INTRAVENOUS at 06:03

## 2018-08-07 RX ADMIN — OXYCODONE HYDROCHLORIDE 5 MILLIGRAM(S): 5 TABLET ORAL at 21:48

## 2018-08-07 RX ADMIN — Medication 3 MILLILITER(S): at 17:31

## 2018-08-07 RX ADMIN — Medication 200 GRAM(S): at 04:22

## 2018-08-07 RX ADMIN — LIDOCAINE 1 PATCH: 4 CREAM TOPICAL at 21:13

## 2018-08-07 RX ADMIN — ENOXAPARIN SODIUM 40 MILLIGRAM(S): 100 INJECTION SUBCUTANEOUS at 12:08

## 2018-08-07 RX ADMIN — Medication 15 GRAM(S): at 19:17

## 2018-08-07 RX ADMIN — Medication 10 MILLIGRAM(S): at 23:36

## 2018-08-07 RX ADMIN — Medication 10 MILLIGRAM(S): at 00:58

## 2018-08-07 RX ADMIN — Medication 3 MILLIGRAM(S): at 00:58

## 2018-08-07 RX ADMIN — Medication 1 MILLIGRAM(S): at 23:43

## 2018-08-07 RX ADMIN — Medication 0.5 MILLIGRAM(S): at 17:31

## 2018-08-07 RX ADMIN — QUETIAPINE FUMARATE 50 MILLIGRAM(S): 200 TABLET, FILM COATED ORAL at 21:48

## 2018-08-07 RX ADMIN — CEFTAROLINE FOSAMIL 50 MILLIGRAM(S): 600 POWDER, FOR SOLUTION INTRAVENOUS at 18:46

## 2018-08-07 RX ADMIN — Medication 3 MILLIGRAM(S): at 21:49

## 2018-08-07 RX ADMIN — DAPTOMYCIN 120 MILLIGRAM(S): 500 INJECTION, POWDER, LYOPHILIZED, FOR SOLUTION INTRAVENOUS at 13:56

## 2018-08-07 RX ADMIN — CEFTAROLINE FOSAMIL 50 MILLIGRAM(S): 600 POWDER, FOR SOLUTION INTRAVENOUS at 06:03

## 2018-08-07 RX ADMIN — DEXMEDETOMIDINE HYDROCHLORIDE IN 0.9% SODIUM CHLORIDE 0.5 MICROGRAM(S)/KG/HR: 4 INJECTION INTRAVENOUS at 00:59

## 2018-08-07 RX ADMIN — SENNA PLUS 2 TABLET(S): 8.6 TABLET ORAL at 00:59

## 2018-08-07 RX ADMIN — Medication 3 MILLILITER(S): at 05:40

## 2018-08-07 RX ADMIN — FAMOTIDINE 20 MILLIGRAM(S): 10 INJECTION INTRAVENOUS at 18:47

## 2018-08-07 NOTE — PROGRESS NOTE ADULT - SUBJECTIVE AND OBJECTIVE BOX
HISTORY  68 year old female with a past medical history of asthma on steroids, CVA (no residual deficits), pulmonary HTN, HLD, GERD, ulcerative colitis, fatty pancreas, impaired glucose tolerance, anxiety, and depression who presented on 7/26/2018 with altered mental status, rigors, and urinary frequency. Of note, patient was recently hospitalized for a right femoral neck fracture secondary to osteoporosis s/p hemiarthroplasty on 6/22/2018. She had been taking Plavix for her history of CVA and Lovenox for VTE prophylaxis but she developed a hematoma at her surgical site so the Lovenox was stopped. Since then, patient has been progressively more agitated, confused, and weak. Additionally, patient has been complaining of new onset low back pain and left hip pain. In the ED, patient was hemodynamically stable but noted to be hypoglycemic to the 20s so she received 2 amps of D50. She was also given 2 L of crystalloid and started on meropenem & vancomycin for concern of sepsis. CT scan revealed a 17 cm collection adjacent to the right hip prosthesis, an acute L2 compression fracture, and new left acetabular fracture. Patient was admitted to medicine but on 7/27/2018, patient was noted to be more altered, rigorous, tachypneic, febrile to 101 F, and hypotensive w/ SBP in the 60s so an RRT was called. She was given 1 L of crystalloid and started on a norepinephrine gtt. Patient subsequently admitted to SICU for hemodynamic monitoring. An emergent right axillary arterial line and left IJ central venous catheter was placed.    24 HOUR EVENTS:  -Patient extubated and now on Hi-rey 40/40  -OG tube was placed in order to give PO medications due to a failed bedside speech and swallow  -Left IJ CVL was removed    SUBJECTIVE/ROS:  [x] Due to altered mental status/intubation, subjective information were not able to be obtained from the patient. History was obtained, to the extent possible, from review of the chart and collateral sources of information.    NEURO  Exam: Awake, Alert, Agitated at times, not oriented   Meds: acetaminophen   Tablet. 975 milliGRAM(s) Oral every 6 hours PRN Mild Pain (1 - 3)  dexmedetomidine Infusion 0.04 MICROgram(s)/kG/Hr IV Continuous <Continuous>  HYDROmorphone  Injectable 0.5 milliGRAM(s) IV Push every 4 hours PRN Severe Breakthrough Pain  LORazepam   Injectable 1 milliGRAM(s) IV Push every 4 hours PRN Agitation  melatonin 3 milliGRAM(s) Oral <User Schedule>  mirtazapine 45 milliGRAM(s) Oral <User Schedule>  oxyCODONE    IR 5 milliGRAM(s) Oral every 4 hours PRN Severe Pain (7 - 10)  QUEtiapine 50 milliGRAM(s) Oral <User Schedule>  sertraline 50 milliGRAM(s) Oral daily  [x] Adequacy of sedation and pain control has been assessed and adjusted    RESPIRATORY  RR: 20 (08-07-18 @ 03:00) (13 - 36)  SpO2: 100% (08-07-18 @ 03:00) (99% - 100%)  Exam: unlabored, clear to auscultation bilaterally  Mechanical Ventilation: Mode: CPAP with PS, RR (patient): 18, FiO2: 30, PEEP: 5, PS: 5, PIP: 12  ABG - ( 07 Aug 2018 03:35 )  pH: 7.50  /  pCO2: 30    /  pO2: 212   / HCO3: 22    / Base Excess: .4    /  SaO2: 100     Lactate: x        Meds: ALBUTerol/ipratropium for Nebulization 3 milliLiter(s) Nebulizer every 6 hours  buDESOnide   0.5 milliGRAM(s) Respule 0.5 milliGRAM(s) Inhalation every 12 hours    CARDIOVASCULAR  HR: 85 (08-07-18 @ 03:00) (84 - 121)  BP: 116/57 (08-07-18 @ 03:00) (107/59 - 179/71)  BP(mean): 82 (08-07-18 @ 03:00) (78 - 104)  Exam: regular rate and rhythm, S1S2  Cardiac Rhythm: sinus  Perfusion     [x]Adequate   [ ]Inadequate  Mentation   [ ]Normal       [x]Reduced  Extremities  [x]Warm         [ ]Cool  Volume Status [ ]Hypervolemic [x]Euvolemic [ ]Hypovolemic    GI/NUTRITION  Exam: soft, nontender, nondistended  Diet: NPO  Meds: docusate sodium Liquid 100 milliGRAM(s) Oral three times a day  famotidine    Tablet 20 milliGRAM(s) Oral two times a day  senna 2 Tablet(s) Oral at bedtime    GENITOURINARY  I&O's Detail  08-05 @ 07:01  -  08-06 @ 07:00  --------------------------------------------------------  IN:    dexmedetomidine Infusion: 258.2 mL    dextrose 5% + lactated ringers.: 225 mL    IV PiggyBack: 700 mL    ns in tub fed  [id number]: 945 mL    Packed Red Blood Cells: 320 mL    phenylephrine   Infusion: 3.8 mL    Solution: 200 mL  Total IN: 2652 mL  OUT:    Accordian: 250 mL    Accordian: 210 mL    Indwelling Catheter - Urethral: 1380 mL  Total OUT: 1840 mL  Total NET: 812 mL    08-06 @ 07:01  -  08-07 @ 04:16  --------------------------------------------------------  IN:    dexmedetomidine Infusion: 173.2 mL    IV PiggyBack: 110 mL    ns in tub fed  [id number]: 45 mL    Packed Red Blood Cells: 277 mL  Total IN: 605.2 mL  OUT:    Accordian: 250 mL    Accordian: 30 mL    Indwelling Catheter - Urethral: 1110 mL  Total OUT: 1390 mL  Total NET: -784.8 mL    08-07  135  |  105  |  22  ----------------------------<  87  4.3   |  19<L>  |  0.98    Ca    6.9<L>      07 Aug 2018 03:41  Phos  3.2     08-07  Mg     2.0     08-07  [x] Blackman catheter  Meds: calcium gluconate IVPB 2 Gram(s) IV Intermittent once    HEMATOLOGIC  Meds: enoxaparin Injectable 40 milliGRAM(s) SubCutaneous daily  [x] VTE Prophylaxis                        7.6    13.0  )-----------( 231      ( 07 Aug 2018 03:41 )             22.9     PT/INR - ( 06 Aug 2018 03:21 )   PT: 12.9 sec;   INR: 1.19 ratio    PTT - ( 06 Aug 2018 03:21 )  PTT:39.2 sec  Transfusion     [ ] PRBC   [ ] Platelets   [ ] FFP   [ ] Cryoprecipitate    INFECTIOUS DISEASES  T(C): 37.1 (08-07-18 @ 03:00), Max: 37.8 (08-06-18 @ 07:30)  WBC Count: 13.0 K/uL (08-07 @ 03:41)  WBC Count: 22.0 K/uL (08-06 @ 12:31)  WBC Count: 20.0 K/uL (08-06 @ 07:54)    Recent Cultures:  Specimen Source: .Blood Blood-Peripheral, 08-05 @ 06:16; Results   Growth in aerobic and anaerobic bottles: Gram Positive Cocci in Clusters; Gram Stain:   Growth in aerobic and anaerobic bottles: Gram Positive Cocci in Clusters; Organism: --  Specimen Source: .Tissue Other, sxiorium right hip, 08-04 @ 07:29; Results   No growth; Gram Stain:   Moderate polymorphonuclear leukocytes seen per low power field  Few Gram Positive Cocci seen per oil power field; Organism: --  Specimen Source: .Blood Blood, 08-04 @ 07:00; Results   No growth to date.; Gram Stain: --; Organism: --  Specimen Source: .Blood Blood-Peripheral, 08-01 @ 08:47; Results   Growth in anaerobic bottle: Methicillin resistant Staphylococcus aureus  See previous culture [id number]; Gram Stain:   Growth in anaerobic bottle: Gram Positive Cocci in Clusters; Organism: --  Specimen Source: .Blood Blood-Peripheral, 07-31 @ 08:35; Results   Growth in aerobic and anaerobic bottles: Methicillin resistant  Staphylococcus aureus  See previous culture [id number]; Gram Stain:   Growth in anaerobic bottle: Gram Positive Cocci in Clusters  Growth in aerobic bottle: Gram Positive Cocci in Clusters; Organism: --    Meds: Ceftaroline Fosamil IVPB 600 milliGRAM(s) IV Intermittent every 12 hours  DAPTOmycin IVPB 500 milliGRAM(s) IV Intermittent every 24 hours    ENDOCRINE  Capillary Blood Glucose    Meds: insulin lispro (HumaLOG) corrective regimen sliding scale   SubCutaneous every 4 hours  predniSONE   Tablet 10 milliGRAM(s) Oral every 24 hours    ACCESS DEVICES:  [x] Peripheral IV Left AC long 20g 8/6, Right AC 20g  [x] OG tube  [x] Drain Hip Right Upper / lower   [x] Urinary Catheter, Date Placed: 7/26  [x] Necessity of urinary, arterial, and venous catheters discussed    OTHER MEDICATIONS:  chlorhexidine 4% Liquid 1 Application(s) Topical <User Schedule>  FIRST- Mouthwash  BLM 5 milliLiter(s) Swish and Spit daily  lidocaine   Patch 1 Patch Transdermal every 24 hours    IMAGING:  < from: Xray Chest 1 View- PORTABLE-Routine (08.06.18 @ 07:17) >  IMPRESSION:  Follow-up showing no significant congestion but persistent   small effusions.

## 2018-08-07 NOTE — PROGRESS NOTE ADULT - SUBJECTIVE AND OBJECTIVE BOX
Pt S/E at bedside, no acute events overnight, pain controlled. Pt extubated yesterday, improving mental status. Continues to have positive blood cultures. Drains placed to gravity and super ACE applied for RLE compression. Received 1U prbc yesterday.    Vital Signs Last 24 Hrs  T(C): 37.1 (07 Aug 2018 03:00), Max: 37.8 (06 Aug 2018 07:30)  T(F): 98.8 (07 Aug 2018 03:00), Max: 100 (06 Aug 2018 07:30)  HR: 90 (07 Aug 2018 06:00) (82 - 115)  BP: 155/79 (07 Aug 2018 06:00) (115/56 - 179/71)  BP(mean): 107 (07 Aug 2018 06:00) (78 - 110)  RR: 22 (07 Aug 2018 06:00) (13 - 36)  SpO2: 100% (07 Aug 2018 06:00) (99% - 100%)    Gen: NAD, AAOx3    Right Lower Extremity:  +incisional vac, c/d/i  +LUCY drain x2  Grossly moving all toes and ankle  +DP/PT Pulses, foot warm/well perfused  Compartments soft  No calf TTP B/L

## 2018-08-07 NOTE — PROGRESS NOTE ADULT - ASSESSMENT
ASSESSMENT:  68 year old female presenting with symptomatic hypoglycemia and septic shock secondary to an infected right hip s/p right hip I&D with hemiarthroplasty revision.    PLAN:    Neuro: acute post-op pain, anxiety, depression, dementia  - Monitor mental status.  - Sedation with Precedex.  - Ativan PRN breakthrough agitation.  - Dilaudid PRN pain.  - Home Zoloft, Seroquel, and Remeron.    Resp: acute respiratory distress/failure, asthma  - Monitor pulse oximeter.  - Mechanical ventilation for acute respiratory distress/failure.  - Pulmicort and Duoneb for asthma.    CV: post-op hypotension  - Monitor vital signs.    GI: no acute issues  - Protonix for GERD.  - Bowel regimen with Colace & senna.    Renal: CKD stage 2, hyponatremia  - Monitor I&Os.  - Monitor electrolytes and replete as necessary.    Heme: no acute issues  - Lovenox for VTE prophylaxis.    ID: MRSA bacteremia, infected right hip s/p I&D  - Monitor WBC, temperature, and procalcitonin.  - Follow up OR and repeat blood cultures. Reculture until patient is no longer bacteremic.  - Daptomycin with ceftaroline for MRSA bacteremia.    Endo: hypoglycemia (resolved), hyperglycemia, adrenal insufficiency  - Prednisone 10 mg daily for asthma.  - Monitor fingersticks q4hrs for hypoglycemia.    Disposition:  - Full code.  - Will remain in SICU for respiratory and hemodynamic monitoring.

## 2018-08-07 NOTE — PROGRESS NOTE ADULT - ASSESSMENT
68F s/p R hip PJI explant and placement of cement spacer w/ ORIF distal femur  Pain control  DVT ppx  FU blood cultures  Cont abx per ID  NWB LLE  FFWB RLE  PT/OT  Cont incisional vac  Monitor drain output  Will discuss with attending

## 2018-08-07 NOTE — PROGRESS NOTE ADULT - SUBJECTIVE AND OBJECTIVE BOX
---___---___---___---___---___---___ ---___---___---___---___---___---___---___---___---___---                    <<<  M E D I C A L   A T T E N D I N G    F O L L O W    U P   N O T E  >>>    extubated still agitated       ---___---___---___---___---___---      <<<  MEDICATIONS:  >>>    MEDICATIONS  (STANDING):  ALBUTerol/ipratropium for Nebulization 3 milliLiter(s) Nebulizer every 6 hours  buDESOnide   0.5 milliGRAM(s) Respule 0.5 milliGRAM(s) Inhalation every 12 hours  Ceftaroline Fosamil IVPB 600 milliGRAM(s) IV Intermittent every 12 hours  chlorhexidine 4% Liquid 1 Application(s) Topical <User Schedule>  DAPTOmycin IVPB 500 milliGRAM(s) IV Intermittent every 24 hours  dexmedetomidine Infusion 0.04 MICROgram(s)/kG/Hr (0.504 mL/Hr) IV Continuous <Continuous>  dextrose 5% + sodium chloride 0.9%. 1000 milliLiter(s) (30 mL/Hr) IV Continuous <Continuous>  dextrose 50% Injectable 50 milliLiter(s) IV Push once  enoxaparin Injectable 40 milliGRAM(s) SubCutaneous daily  famotidine    Tablet 20 milliGRAM(s) Oral two times a day  FIRST- Mouthwash  BLM 5 milliLiter(s) Swish and Spit daily  insulin lispro (HumaLOG) corrective regimen sliding scale   SubCutaneous every 4 hours  lidocaine   Patch 1 Patch Transdermal every 24 hours  melatonin 3 milliGRAM(s) Oral <User Schedule>  mirtazapine 45 milliGRAM(s) Oral <User Schedule>  predniSONE   Tablet 10 milliGRAM(s) Oral every 24 hours  QUEtiapine 50 milliGRAM(s) Oral <User Schedule>  sertraline 50 milliGRAM(s) Oral daily      MEDICATIONS  (PRN):  acetaminophen   Tablet. 975 milliGRAM(s) Oral every 6 hours PRN Mild Pain (1 - 3)  HYDROmorphone  Injectable 0.5 milliGRAM(s) IV Push every 4 hours PRN Severe Breakthrough Pain  LORazepam   Injectable 1 milliGRAM(s) IV Push every 4 hours PRN Agitation  oxyCODONE    IR 5 milliGRAM(s) Oral every 4 hours PRN Severe Pain (7 - 10)       ---___---___---___---___---___---     <<<REVIEW OF SYSTEM: >>>     ---___---___---___---___---___---          <<<  VITAL SIGNS: >>>    T(F): 98.3 (08-07-18 @ 15:00), Max: 98.9 (08-06-18 @ 19:00)  HR: 90 (08-07-18 @ 17:32) (82 - 100)  BP: 137/91 (08-07-18 @ 17:00) (116/55 - 165/85)  RR: 12 (08-07-18 @ 17:00) (12 - 35)  SpO2: 99% (08-07-18 @ 17:32) (89% - 100%)  Wt(kg): --  CAPILLARY BLOOD GLUCOSE      POCT Blood Glucose.: 34 mg/dL (07 Aug 2018 18:32)    I&O's Summary    06 Aug 2018 07:01  -  07 Aug 2018 07:00  --------------------------------------------------------  IN: 835.6 mL / OUT: 1705 mL / NET: -869.4 mL    07 Aug 2018 07:01  -  07 Aug 2018 18:39  --------------------------------------------------------  IN: 404.9 mL / OUT: 465 mL / NET: -60.1 mL         ---___---___---___---___---___---                       PHYSICAL EXAM:    GEN: A&O X 3 , NAD , comfortable  HEENT: NCAT, PERRL, MMM, no scleral icterus, hearing intact ng tube noted   NECK: Supple, No JVD  CVS: S1S2 , regular , No M/R/G appreciated  PULM: CTA B/L,  no W/R/R appreciated  ABD.: soft. non tender, non distended,  bowel sounds present  Extrem: intact pulses , no edema noted edema to right leg  Derm: No rash or ecchymosis noted        ---___---___---___---___---___---     <<<  LAB AND IMAGING: >>>                          8.4    14.4  )-----------( 269      ( 07 Aug 2018 14:27 )             24.2               08-07    135  |  105  |  22  ----------------------------<  87  4.3   |  19<L>  |  0.98    Ca    6.9<L>      07 Aug 2018 03:41  Phos  3.2     08-07  Mg     2.0     08-07      PT/INR - ( 06 Aug 2018 03:21 )   PT: 12.9 sec;   INR: 1.19 ratio         PTT - ( 06 Aug 2018 03:21 )  PTT:39.2 sec                   ABG - ( 07 Aug 2018 03:35 )  pH, Arterial: 7.50  pH, Blood: x     /  pCO2: 30    /  pO2: 212   / HCO3: 22    / Base Excess: .4    /  SaO2: 100                     [All pertinent / recent available Imaging reports and other labs reviewed]     ---___---___---___---___---___---___ ---___---___---___---___---           <<<  A S S E S S M E N T   A N D   P L A N :  >>>          -GI/DVT Prophylaxis.    --------------------------------------------  Case discussed with   Education given on     >>______________________<<      Deniz Bolden .         phone   [phone]

## 2018-08-07 NOTE — PROGRESS NOTE ADULT - SUBJECTIVE AND OBJECTIVE BOX
CC: f/u for infected rt hip, MRSA bacteremia    Patient reports: baseline confusion,taken off highflow,now on nasal oxygen,off pressers,stable HCT    REVIEW OF SYSTEMS:  All other review of systems negative (Comprehensive ROS)    Antimicrobials Day #  :POD # 4, total day 12  Ceftaroline Fosamil IVPB 600 milliGRAM(s) IV Intermittent every 12 hours  DAPTOmycin IVPB 500 milliGRAM(s) IV Intermittent every 24 hours    Other Medications Reviewed    T(F): 98.3 (08-07-18 @ 15:00), Max: 98.9 (08-06-18 @ 19:00)  HR: 90 (08-07-18 @ 17:32)  BP: 137/91 (08-07-18 @ 17:00)  RR: 12 (08-07-18 @ 17:00)  SpO2: 99% (08-07-18 @ 17:32)  Wt(kg): --    PHYSICAL EXAM:  General: alert, no acute distress  Eyes:  anicteric, no conjunctival injection, no discharge  Oropharynx: no lesions or injection 	  Neck: supple, without adenopathy  Lungs: clear to auscultation  Heart: regular rate and rhythm; no murmur, rubs or gallops  Abdomen: soft, nondistended, nontender, without mass or organomegaly  Skin: no lesions  Extremities: no clubbing, cyanosis, trace edema  Neurologic: alert, confused, moves all extremities.  Rt leg is dressed, wound drains in place    LAB RESULTS:                        8.4    14.4  )-----------( 269      ( 07 Aug 2018 14:27 )             24.2     08-07    135  |  105  |  22  ----------------------------<  87  4.3   |  19<L>  |  0.98    Ca    6.9<L>      07 Aug 2018 03:41  Phos  3.2     08-07  Mg     2.0     08-07          MICROBIOLOGY:  RECENT CULTURES:  08-06 @ 15:50 .Blood Blood-Peripheral     No growth to date.      08-05 @ 06:16 .Blood Blood-Peripheral     Growth in aerobic and anaerobic bottles: Coag Negative Staphylococcus  Single set isolate, possible contaminant. Contact  Microbiology if susceptibility testing clinically  indicated.    Growth in aerobic and anaerobic bottles: Gram Positive Cocci in Clusters    08-04 @ 07:29 .Tissue Other, sxiorium right hip     No growth    Moderate polymorphonuclear leukocytes seen per low power field  Few Gram Positive Cocci seen per oil power field    08-04 @ 07:00 .Blood Blood     No growth to date.          RADIOLOGY REVIEWED:  < from: Xray Chest 1 View- PORTABLE-Urgent (08.07.18 @ 00:43) >    INTERPRETATION:  TIME OF EXAM: August 7, 2018 at 12:24 AM    CLINICAL INFORMATION: Nasogastric tube placement    TECHNIQUE:   Portable chest    INTERPRETATION:     Enteric tube is seen coursing down the esophagus and coiling at the level   of the cardioesophageal junction with its tip pointing up the esophagus.   Nonobstructive gas pattern.    Visualized lungs show no acute pathology.      COMPARISON:  August 6 at 7:06 AM      IMPRESSION:  Enteric tip with tip in distal esophagus after kinking at   the level of the cardioesophageal junction.    Comment:  Additional radiographs obtained at August 7 at 7:02 AM now   shows the enteric tube entering the stomach with its tip not included on   the study.    < end of copied text >

## 2018-08-07 NOTE — PROGRESS NOTE ADULT - ATTENDING COMMENTS
Rx of dementia  Good gas exchange, CXR stable, wean off HFNC to NC  Hemodynamically stable  Swallow eval  Continue abx Dapto and Teflaro for persistent MRSA bacteremia, continue daily culture  PT/mobilization

## 2018-08-07 NOTE — PROGRESS NOTE ADULT - ASSESSMENT
67 yo female with dementia, history of UC, and s/p RT Hip hemiarthroplasty 6/22/18  postsurgical hematoma  admitted with low grade fever, rigors, increased confusion, found to have leukocytosis, 20% bands, ICU, pressor use   Bld Cxs 7/26-8/1 still positive all MRSA  s/p R hip I&D, lavage, poly exchange- pus encountered, all specimens S aureus  Afebrile, Creat stable  Now on Dapto and ceftaroline, most recent cultures have been negative(8/4 and 8/6)  GAYATHRI: no evidence of valvular vegetation  s/p return to OR, removal of hardware/spacer, POD # 4, perhaps effective source control  leukocytosis likely acute leukemoid reaction, stress leukocytosis  Plan:  cont combo Tx with Dapto and Ceftaroline for now  downgrade to Dapto/Rifampin might be an option once bacteremia controlled  f/u repeat blood cult to ensure clearance of bacteremia, hopefully achieved  f/u OR cultures  supportive care

## 2018-08-07 NOTE — CONSULT NOTE ADULT - SUBJECTIVE AND OBJECTIVE BOX
Patient is a 68y old  Female who presents with a chief complaint of weakness (26 Jul 2018 16:54)      HPI:  68F pmhx of asthma, anxiety/depression, HLD, UC, pHTN presents with weakness.  She was recently hospitalized here for a right hip fracture s/p repair last month.  She is chrissie in by her family who say that on Monday she had the staples removed from her hip surgery.  She was 2 weeks ago developed a hematoma on the right hip after using Plavix with Lovenox for PPX after orhto surgery so this was stopped.  She was doing fine this week until last night when she said that she had to go to the bathroom "30 times" per the  and could not urinate or have BM.  She kept trying to get out of bed and walk to the bathroom and she usually walks with a walker with some assistance but last night she actuely became weak and not able to walk with walker and 2 person assist.  She also was brought to the ED 2 weeks ago for new onset severe back pain with radiation to her left hip.  Her  says this presentation was similar to when she broke her right hip last month (has osteoperosis) and was concerned again but after ED visit said she did not break her hip.  She has been taking her Oxycodone since the surgery Q4hrs w/o pain relief.  Last night she developed rigors, did not take temperature, urinary frequency, increased confusion and agitation along with back pain and LE weakness so she was brought days.     In ED:  T 99.6 rectally HR 60-92 /58 RR 18  She was seen by orthopedics for concern of joint infection in the right hip, given Meropenum and Vancomycin, 2LNs and also 2 amps of D50 for symptomatic (AMS) hypoglycemia. (26 Jul 2018 16:54)      PAST MEDICAL & SURGICAL HISTORY:  CVA (cerebral vascular accident)  Fatty pancreas  PNA (pneumonia)  Pulmonary HTN  IGT (impaired glucose tolerance)  Ulcerative colitis  Acid reflux  Anxiety  Depression  Mouth sores  HLD (hyperlipidemia)  Asthma  Humeral head fracture  H/O: hysterectomy  H/O cataract extraction, left  History of knee replacement    (   ) heart valve replacement  (   ) joint replacement  (   ) pregnancy    MEDICATIONS  (STANDING):  ALBUTerol/ipratropium for Nebulization 3 milliLiter(s) Nebulizer every 6 hours  buDESOnide   0.5 milliGRAM(s) Respule 0.5 milliGRAM(s) Inhalation every 12 hours  Ceftaroline Fosamil IVPB 600 milliGRAM(s) IV Intermittent every 12 hours  chlorhexidine 4% Liquid 1 Application(s) Topical <User Schedule>  DAPTOmycin IVPB 500 milliGRAM(s) IV Intermittent every 24 hours  dexmedetomidine Infusion 0.04 MICROgram(s)/kG/Hr (0.504 mL/Hr) IV Continuous <Continuous>  enoxaparin Injectable 40 milliGRAM(s) SubCutaneous daily  famotidine    Tablet 20 milliGRAM(s) Oral two times a day  FIRST- Mouthwash  BLM 5 milliLiter(s) Swish and Spit daily  insulin lispro (HumaLOG) corrective regimen sliding scale   SubCutaneous every 4 hours  lidocaine   Patch 1 Patch Transdermal every 24 hours  melatonin 3 milliGRAM(s) Oral <User Schedule>  mirtazapine 45 milliGRAM(s) Oral <User Schedule>  predniSONE   Tablet 10 milliGRAM(s) Oral every 24 hours  QUEtiapine 50 milliGRAM(s) Oral <User Schedule>  sertraline 50 milliGRAM(s) Oral daily    MEDICATIONS  (PRN):  acetaminophen   Tablet. 975 milliGRAM(s) Oral every 6 hours PRN Mild Pain (1 - 3)  HYDROmorphone  Injectable 0.5 milliGRAM(s) IV Push every 4 hours PRN Severe Breakthrough Pain  LORazepam   Injectable 1 milliGRAM(s) IV Push every 4 hours PRN Agitation  oxyCODONE    IR 5 milliGRAM(s) Oral every 4 hours PRN Severe Pain (7 - 10)      Allergies    ASA; dye contrast (Anaphylaxis)  aspirin (Short breath)  divalproex sodium (Other (Unknown))  Haldol (Other (Unknown))  penicillin (Short breath; Rash)  sulfa drugs (Short breath; Rash)  Xanax (Other (Unknown))        FAMILY HISTORY:  Family history of dementia (Grandparent)  Family history of colon cancer (Grandparent)      Vital Signs Last 24 Hrs  T(C): 36.9 (07 Aug 2018 19:00), Max: 37.1 (07 Aug 2018 03:00)  T(F): 98.5 (07 Aug 2018 19:00), Max: 98.8 (07 Aug 2018 03:00)  HR: 89 (07 Aug 2018 22:00) (82 - 100)  BP: 164/75 (07 Aug 2018 22:00) (116/55 - 169/93)  BP(mean): 112 (07 Aug 2018 22:00) (79 - 124)  RR: 28 (07 Aug 2018 22:00) (9 - 35)  SpO2: 100% (07 Aug 2018 22:00) (89% - 100%)    EOE:  TMJ ( -  ) clicks                    ( -   ) pops                    ( -   ) crepitus                                 (  + ) trismus             (  - ) LAD             (  - ) swelling             (  - ) asymmetry             (  - ) palpation             (  - ) SOB             (  - ) dysphagia             (  - ) LOC    IOE:  <<permanent>> dentition: <multiple carious teeth and multiple missing teeth>>           hard/soft palate:  ( + for mouth sores  )            tongue/FOM <<WNL>>           labial/buccal mucosa <<WNL>>           ( -   ) percussion           (  - ) palpation           (  - ) swelling           LABS:                        8.4    14.4  )-----------( 269      ( 07 Aug 2018 14:27 )             24.2     08-07    135  |  105  |  22  ----------------------------<  87  4.3   |  19<L>  |  0.98    Ca    6.9<L>      07 Aug 2018 03:41  Phos  3.2     08-07  Mg     2.0     08-07      WBC Count: 14.4 K/uL <H> [3.8 - 10.5] (08-07 @ 14:27)  WBC Count: 13.0 K/uL <H> [3.8 - 10.5] (08-07 @ 03:41)    Platelet Count - Automated: 269 K/uL [150 - 400] (08-07 @ 14:27)  Platelet Count - Automated: 231 K/uL [150 - 400] (08-07 @ 03:41)  Platelet Count - Automated: 237 K/uL [150 - 400] (08-06 @ 12:31)  Platelet Count - Automated: 214 K/uL [150 - 400] (08-06 @ 07:54)  Platelet Count - Automated: 186 K/uL [150 - 400] (08-06 @ 03:21)  Platelet Count - Automated: SEE NOTE K/uL [150 - 400] (08-06 @ 02:32)    INR: 1.19 ratio <H> [0.88 - 1.16] (08-06 @ 03:21)    Culture Results:   No growth to date. (08-06 @ 15:50)  Culture Results:   No growth to date. (08-06 @ 15:50)  Culture Results:   Growth in aerobic and anaerobic bottles: Coag Negative Staphylococcus  Single set isolate, possible contaminant. Contact  Microbiology if susceptibility testing clinically  indicated. (08-05 @ 06:16)  Culture Results:   No growth to date. (08-05 @ 06:16)      ASSESSMENT: Pt consulted for chipped tooth during intubation. Pt has implants on top with denture that screwed into the implants. EOE exam no remarkable findings except slight trismus. Pt's  reports patient clenches very hard. IOE reveals no oral lesions found except on hard palate which had mouth sores. Pt wears upper complete dentures on implants on the upper and has multiple missing teeth on bottom arch with a partial denture. Pts  concerned with chipped tooth #8 on upper denture that may have broke off during intubation. At this time nothing can be done to fix chip on denture but have her outside dentist remove denture and send to a dental lab for repair.         RECOMMENDATIONS:   1) Dental F/U with outpatient dentist for comprehensive dental care.   2) If any difficulty swallowing/breathing, fever occur, page dental.     Kunal Mcmullen DDS and Tisha Hawthorne DDS, pager #[phone] Patient is a 68y old  Female who presents with a chief complaint of weakness (26 Jul 2018 16:54)      HPI:  68F pmhx of asthma, anxiety/depression, HLD, UC, pHTN presents with weakness.  She was recently hospitalized here for a right hip fracture s/p repair last month.  She is chrissie in by her family who say that on Monday she had the staples removed from her hip surgery.  She was 2 weeks ago developed a hematoma on the right hip after using Plavix with Lovenox for PPX after orhto surgery so this was stopped.  She was doing fine this week until last night when she said that she had to go to the bathroom "30 times" per the  and could not urinate or have BM.  She kept trying to get out of bed and walk to the bathroom and she usually walks with a walker with some assistance but last night she actuely became weak and not able to walk with walker and 2 person assist.  She also was brought to the ED 2 weeks ago for new onset severe back pain with radiation to her left hip.  Her  says this presentation was similar to when she broke her right hip last month (has osteoperosis) and was concerned again but after ED visit said she did not break her hip.  She has been taking her Oxycodone since the surgery Q4hrs w/o pain relief.  Last night she developed rigors, did not take temperature, urinary frequency, increased confusion and agitation along with back pain and LE weakness so she was brought days.     In ED:  T 99.6 rectally HR 60-92 /58 RR 18  She was seen by orthopedics for concern of joint infection in the right hip, given Meropenum and Vancomycin, 2LNs and also 2 amps of D50 for symptomatic (AMS) hypoglycemia. (26 Jul 2018 16:54)      PAST MEDICAL & SURGICAL HISTORY:  CVA (cerebral vascular accident)  Fatty pancreas  PNA (pneumonia)  Pulmonary HTN  IGT (impaired glucose tolerance)  Ulcerative colitis  Acid reflux  Anxiety  Depression  Mouth sores  HLD (hyperlipidemia)  Asthma  Humeral head fracture  H/O: hysterectomy  H/O cataract extraction, left  History of knee replacement    (   ) heart valve replacement  (   ) joint replacement  (   ) pregnancy    MEDICATIONS  (STANDING):  ALBUTerol/ipratropium for Nebulization 3 milliLiter(s) Nebulizer every 6 hours  buDESOnide   0.5 milliGRAM(s) Respule 0.5 milliGRAM(s) Inhalation every 12 hours  Ceftaroline Fosamil IVPB 600 milliGRAM(s) IV Intermittent every 12 hours  chlorhexidine 4% Liquid 1 Application(s) Topical <User Schedule>  DAPTOmycin IVPB 500 milliGRAM(s) IV Intermittent every 24 hours  dexmedetomidine Infusion 0.04 MICROgram(s)/kG/Hr (0.504 mL/Hr) IV Continuous <Continuous>  enoxaparin Injectable 40 milliGRAM(s) SubCutaneous daily  famotidine    Tablet 20 milliGRAM(s) Oral two times a day  FIRST- Mouthwash  BLM 5 milliLiter(s) Swish and Spit daily  insulin lispro (HumaLOG) corrective regimen sliding scale   SubCutaneous every 4 hours  lidocaine   Patch 1 Patch Transdermal every 24 hours  melatonin 3 milliGRAM(s) Oral <User Schedule>  mirtazapine 45 milliGRAM(s) Oral <User Schedule>  predniSONE   Tablet 10 milliGRAM(s) Oral every 24 hours  QUEtiapine 50 milliGRAM(s) Oral <User Schedule>  sertraline 50 milliGRAM(s) Oral daily    MEDICATIONS  (PRN):  acetaminophen   Tablet. 975 milliGRAM(s) Oral every 6 hours PRN Mild Pain (1 - 3)  HYDROmorphone  Injectable 0.5 milliGRAM(s) IV Push every 4 hours PRN Severe Breakthrough Pain  LORazepam   Injectable 1 milliGRAM(s) IV Push every 4 hours PRN Agitation  oxyCODONE    IR 5 milliGRAM(s) Oral every 4 hours PRN Severe Pain (7 - 10)      Allergies    ASA; dye contrast (Anaphylaxis)  aspirin (Short breath)  divalproex sodium (Other (Unknown))  Haldol (Other (Unknown))  penicillin (Short breath; Rash)  sulfa drugs (Short breath; Rash)  Xanax (Other (Unknown))        FAMILY HISTORY:  Family history of dementia (Grandparent)  Family history of colon cancer (Grandparent)      Vital Signs Last 24 Hrs  T(C): 36.9 (07 Aug 2018 19:00), Max: 37.1 (07 Aug 2018 03:00)  T(F): 98.5 (07 Aug 2018 19:00), Max: 98.8 (07 Aug 2018 03:00)  HR: 89 (07 Aug 2018 22:00) (82 - 100)  BP: 164/75 (07 Aug 2018 22:00) (116/55 - 169/93)  BP(mean): 112 (07 Aug 2018 22:00) (79 - 124)  RR: 28 (07 Aug 2018 22:00) (9 - 35)  SpO2: 100% (07 Aug 2018 22:00) (89% - 100%)    EOE:  TMJ ( -  ) clicks                    ( -   ) pops                    ( -   ) crepitus                                 (  + ) trismus             (  - ) LAD             (  - ) swelling             (  - ) asymmetry             (  - ) palpation             (  - ) SOB             (  - ) dysphagia             (  - ) LOC    IOE:  <<permanent>> dentition: <multiple carious teeth and multiple missing teeth>>           hard/soft palate:  ( + for mouth sores  )            tongue/FOM <<WNL>>           labial/buccal mucosa <<WNL>>           ( -   ) percussion           (  - ) palpation           (  - ) swelling           LABS:                        8.4    14.4  )-----------( 269      ( 07 Aug 2018 14:27 )             24.2     08-07    135  |  105  |  22  ----------------------------<  87  4.3   |  19<L>  |  0.98    Ca    6.9<L>      07 Aug 2018 03:41  Phos  3.2     08-07  Mg     2.0     08-07      WBC Count: 14.4 K/uL <H> [3.8 - 10.5] (08-07 @ 14:27)  WBC Count: 13.0 K/uL <H> [3.8 - 10.5] (08-07 @ 03:41)    Platelet Count - Automated: 269 K/uL [150 - 400] (08-07 @ 14:27)  Platelet Count - Automated: 231 K/uL [150 - 400] (08-07 @ 03:41)  Platelet Count - Automated: 237 K/uL [150 - 400] (08-06 @ 12:31)  Platelet Count - Automated: 214 K/uL [150 - 400] (08-06 @ 07:54)  Platelet Count - Automated: 186 K/uL [150 - 400] (08-06 @ 03:21)  Platelet Count - Automated: SEE NOTE K/uL [150 - 400] (08-06 @ 02:32)    INR: 1.19 ratio <H> [0.88 - 1.16] (08-06 @ 03:21)    Culture Results:   No growth to date. (08-06 @ 15:50)  Culture Results:   No growth to date. (08-06 @ 15:50)  Culture Results:   Growth in aerobic and anaerobic bottles: Coag Negative Staphylococcus  Single set isolate, possible contaminant. Contact  Microbiology if susceptibility testing clinically  indicated. (08-05 @ 06:16)  Culture Results:   No growth to date. (08-05 @ 06:16)      ASSESSMENT: Pt consulted for chipped tooth during intubation. Pt has an implant retained prosthesis without palatal coverage. EOE exam no remarkable findings except slight trismus. Pt's  reports patient clenches very hard. IOE reveals no oral lesions found except on hard palate which had mouth sores. Pt wears upper complete dentures on implants on the upper and has multiple missing teeth on bottom arch with a partial denture. Pts  concerned with #10 crown that fractured off upper prosthesis that may have broke off during intubation. At this time nothing can be done to fix chip on denture but have her outside dentist remove denture and send to a dental lab for repair.         RECOMMENDATIONS:   1) Dental F/U with outpatient dentist for comprehensive dental care.   2) If any difficulty swallowing/breathing, fever occur, page dental.     Kunal Mcmullen DDS and Tisha Hawthorne DDS, pager #[phone]

## 2018-08-08 LAB
ANION GAP SERPL CALC-SCNC: 12 MMOL/L — SIGNIFICANT CHANGE UP (ref 5–17)
BUN SERPL-MCNC: 21 MG/DL — SIGNIFICANT CHANGE UP (ref 7–23)
CALCIUM SERPL-MCNC: 7.2 MG/DL — LOW (ref 8.4–10.5)
CHLORIDE SERPL-SCNC: 101 MMOL/L — SIGNIFICANT CHANGE UP (ref 96–108)
CO2 SERPL-SCNC: 18 MMOL/L — LOW (ref 22–31)
CREAT SERPL-MCNC: 1.02 MG/DL — SIGNIFICANT CHANGE UP (ref 0.5–1.3)
GAS PNL BLDA: SIGNIFICANT CHANGE UP
GLUCOSE SERPL-MCNC: 169 MG/DL — HIGH (ref 70–99)
HCT VFR BLD CALC: 24.5 % — LOW (ref 34.5–45)
HGB BLD-MCNC: 8.1 G/DL — LOW (ref 11.5–15.5)
MCHC RBC-ENTMCNC: 30 PG — SIGNIFICANT CHANGE UP (ref 27–34)
MCHC RBC-ENTMCNC: 33.2 GM/DL — SIGNIFICANT CHANGE UP (ref 32–36)
MCV RBC AUTO: 90.4 FL — SIGNIFICANT CHANGE UP (ref 80–100)
PLATELET # BLD AUTO: 275 K/UL — SIGNIFICANT CHANGE UP (ref 150–400)
POTASSIUM SERPL-MCNC: 4.3 MMOL/L — SIGNIFICANT CHANGE UP (ref 3.5–5.3)
POTASSIUM SERPL-SCNC: 4.3 MMOL/L — SIGNIFICANT CHANGE UP (ref 3.5–5.3)
RBC # BLD: 2.71 M/UL — LOW (ref 3.8–5.2)
RBC # FLD: 15.2 % — HIGH (ref 10.3–14.5)
SODIUM SERPL-SCNC: 131 MMOL/L — LOW (ref 135–145)
WBC # BLD: 12.6 K/UL — HIGH (ref 3.8–10.5)
WBC # FLD AUTO: 12.6 K/UL — HIGH (ref 3.8–10.5)

## 2018-08-08 PROCEDURE — 99233 SBSQ HOSP IP/OBS HIGH 50: CPT | Mod: GC

## 2018-08-08 PROCEDURE — 93971 EXTREMITY STUDY: CPT | Mod: 26

## 2018-08-08 PROCEDURE — 71045 X-RAY EXAM CHEST 1 VIEW: CPT | Mod: 26,76

## 2018-08-08 RX ORDER — MAGNESIUM SULFATE 500 MG/ML
2 VIAL (ML) INJECTION ONCE
Qty: 0 | Refills: 0 | Status: COMPLETED | OUTPATIENT
Start: 2018-08-08 | End: 2018-08-08

## 2018-08-08 RX ADMIN — Medication 3 MILLILITER(S): at 11:58

## 2018-08-08 RX ADMIN — HYDROMORPHONE HYDROCHLORIDE 0.5 MILLIGRAM(S): 2 INJECTION INTRAMUSCULAR; INTRAVENOUS; SUBCUTANEOUS at 18:43

## 2018-08-08 RX ADMIN — CEFTAROLINE FOSAMIL 50 MILLIGRAM(S): 600 POWDER, FOR SOLUTION INTRAVENOUS at 09:22

## 2018-08-08 RX ADMIN — CHLORHEXIDINE GLUCONATE 1 APPLICATION(S): 213 SOLUTION TOPICAL at 05:42

## 2018-08-08 RX ADMIN — DEXMEDETOMIDINE HYDROCHLORIDE IN 0.9% SODIUM CHLORIDE 0.5 MICROGRAM(S)/KG/HR: 4 INJECTION INTRAVENOUS at 10:23

## 2018-08-08 RX ADMIN — Medication 4: at 05:46

## 2018-08-08 RX ADMIN — FAMOTIDINE 20 MILLIGRAM(S): 10 INJECTION INTRAVENOUS at 17:55

## 2018-08-08 RX ADMIN — LIDOCAINE 1 PATCH: 4 CREAM TOPICAL at 10:38

## 2018-08-08 RX ADMIN — Medication 975 MILLIGRAM(S): at 10:38

## 2018-08-08 RX ADMIN — Medication 0.5 MILLIGRAM(S): at 17:57

## 2018-08-08 RX ADMIN — Medication 2: at 04:00

## 2018-08-08 RX ADMIN — Medication 3 MILLIGRAM(S): at 20:32

## 2018-08-08 RX ADMIN — QUETIAPINE FUMARATE 50 MILLIGRAM(S): 200 TABLET, FILM COATED ORAL at 20:32

## 2018-08-08 RX ADMIN — OXYCODONE HYDROCHLORIDE 5 MILLIGRAM(S): 5 TABLET ORAL at 20:25

## 2018-08-08 RX ADMIN — Medication 50 GRAM(S): at 10:22

## 2018-08-08 RX ADMIN — Medication 975 MILLIGRAM(S): at 11:08

## 2018-08-08 RX ADMIN — HYDROMORPHONE HYDROCHLORIDE 0.5 MILLIGRAM(S): 2 INJECTION INTRAMUSCULAR; INTRAVENOUS; SUBCUTANEOUS at 19:00

## 2018-08-08 RX ADMIN — DAPTOMYCIN 120 MILLIGRAM(S): 500 INJECTION, POWDER, LYOPHILIZED, FOR SOLUTION INTRAVENOUS at 13:58

## 2018-08-08 RX ADMIN — OXYCODONE HYDROCHLORIDE 5 MILLIGRAM(S): 5 TABLET ORAL at 19:41

## 2018-08-08 RX ADMIN — Medication 3 MILLILITER(S): at 17:57

## 2018-08-08 RX ADMIN — Medication 0.5 MILLIGRAM(S): at 05:39

## 2018-08-08 RX ADMIN — DIPHENHYDRAMINE HYDROCHLORIDE AND LIDOCAINE HYDROCHLORIDE AND ALUMINUM HYDROXIDE AND MAGNESIUM HYDRO 5 MILLILITER(S): KIT at 11:32

## 2018-08-08 RX ADMIN — ENOXAPARIN SODIUM 40 MILLIGRAM(S): 100 INJECTION SUBCUTANEOUS at 11:31

## 2018-08-08 RX ADMIN — SERTRALINE 50 MILLIGRAM(S): 25 TABLET, FILM COATED ORAL at 11:31

## 2018-08-08 RX ADMIN — MIRTAZAPINE 45 MILLIGRAM(S): 45 TABLET, ORALLY DISINTEGRATING ORAL at 20:32

## 2018-08-08 RX ADMIN — Medication 3 MILLILITER(S): at 05:39

## 2018-08-08 RX ADMIN — FAMOTIDINE 20 MILLIGRAM(S): 10 INJECTION INTRAVENOUS at 06:27

## 2018-08-08 NOTE — PROGRESS NOTE ADULT - ASSESSMENT
ASSESSMENT:  68 year old female presenting with symptomatic hypoglycemia and septic shock secondary to an infected right hip s/p right hip I&D with hemiarthroplasty revision.    PLAN:    Neuro: acute post-op pain, intubated, anxiety, depression, dementia  - Monitor mental status.  - Sedation with Precedex while intubated. Ativan PRN breakthrough agitation.  - Dilaudid PRN pain.  - Home Zoloft, Seroquel, and Remeron.    Resp: acute respiratory distress/failure, asthma  - Monitor pulse oximeter.  - Mechanical ventilation for acute respiratory distress/failure.  - Pulmicort and Duoneb for asthma.    CV: post-op hypotension  - Monitor vital signs.  - Wean phenylephrine gtt as tolerated for goal MAP > 65.    GI: no acute issues  - NPO with Jevity at goal of 45 mL/hr.  - Protonix for GERD.  - Bowel regimen with Colace & senna.    Renal: CKD stage 2, hyponatremia  - Monitor I&Os.  - Monitor electrolytes and replete as necessary.  - D5LR at 75 mL/hr while hypoglycemic, NPO, and being actively resuscitated.    Heme: no acute issues  - Lovenox for VTE prophylaxis.    ID: MRSA bacteremia, infected right hip s/p I&D  - Monitor WBC, temperature, and procalcitonin.  - Follow up OR and repeat blood cultures. Reculture until patient is no longer bacteremic.  - Daptomycin with ceftaroline for MRSA bacteremia.    Endo: hypoglycemia (resolved), hyperglycemia, adrenal insufficiency  - Prednisone 10 mg daily for asthma.  - Monitor fingersticks q4hrs for hypoglycemia.    Disposition:  - Full code.  - Will remain in SICU for respiratory and hemodynamic monitoring.    Cynthia Wilkins PA-C    [phone] ASSESSMENT:  68 year old female presenting with septic shock from MRSA bacteremia secondary to infected right hemiarthroplasty hardware s/p right hip I&D, explantation of right hemiarthroplasty, placement of antibiotic spacer, and right femur ORIF.    PLAN:    Neuro: acute post-op pain, anxiety, depression, dementia  - Monitor mental status.  - Sedation with Precedex for agitation. Home regimen of Zoloft, Seroquel, Remeron, and melatonin for agitation related to her dementia.  - Pain control with Tylenol, oxycodone, and Dilaudid.    Resp: asthma, atelectasis  - Monitor pulse oximeter.  - Out of bed to chair, incentive spirometry, and aggressive chest PT to prevent atelectasis and facilitate secretion mobilization.  - Home prednisone 10 mg daily, Pulmicort, and Duoneb for asthma.    CV: distributive shock (resolved)  - Monitor vital signs.    GI: no acute issues  - NPO with tube feeds. Will advance Jevity to goal of 45 mL/hr.  - Famotidine for GERD.  - Bowel regimen with Colace & senna.    Renal: CKD stage 2, hyponatremia  - Monitor I&Os.  - Monitor electrolytes and replete as necessary.  - Assess need for diuresis.    Heme: no acute issues  - Lovenox for VTE prophylaxis.    ID: MRSA bacteremia, infected right hip s/p I&D, explantation of hardware, & placement of antibiotic spacer  - Monitor WBC, temperature, and procalcitonin.  - Follow up OR and repeat blood cultures. Reculture until patient is no longer bacteremic. Blood cultures from 8/6/2018 currently no growth to date.  - Daptomycin with ceftaroline for MRSA bacteremia. Appreciate ID recommendations.    Endo: hyperglycemia  - Monitor fingersticks q4hrs for hypoglycemia.    Disposition:  - Full code.  - Will remain in SICU for respiratory monitoring.    Cynthia Wilkins PA-C    [phone]

## 2018-08-08 NOTE — PROGRESS NOTE ADULT - SUBJECTIVE AND OBJECTIVE BOX
CC: f/u for mrsa bacteremia and septic hip    Patient reports she is feeling bettr tiday    REVIEW OF SYSTEMS:  All other review of systems negative (Comprehensive ROS)    Antimicrobials Day #  :pod leisa and spacer  #4/42  DAPTOmycin IVPB 500 milliGRAM(s) IV Intermittent every 24 hours  rifampin IVPB 600 milliGRAM(s) IV Intermittent once  rifampin IVPB        Other Medications Reviewed    T(F): 98.9 (08-08-18 @ 13:00), Max: 100.2 (08-08-18 @ 08:00)  HR: 109 (08-08-18 @ 13:00)  BP: 158/75 (08-08-18 @ 13:00)  RR: 29 (08-08-18 @ 13:00)  SpO2: 100% (08-08-18 @ 13:00)  Wt(kg): --    PHYSICAL EXAM:  General:much more  alert, no acute distress  Eyes:  anicteric, no conjunctival injection, no discharge  Oropharynx: no lesions or injection 	  Neck: supple, without adenopathy  Lungs: decreased bases to auscultation  Heart: S1S2  Abdomen: soft, nondistended, nontender, without mass or organomegaly  Skin: no lesions  Extremities: right leg with vac  Neurologic: alert, , moves all extremities    LAB RESULTS:                        8.1    12.6  )-----------( 275      ( 08 Aug 2018 03:23 )             24.5     08-08    131<L>  |  101  |  21  ----------------------------<  169<H>  4.3   |  18<L>  |  1.02    Ca    7.2<L>      08 Aug 2018 03:23  Phos  3.2     08-08  Mg     1.7     08-08          MICROBIOLOGY:  RECENT CULTURES:  08-07 @ 09:00 .Blood Blood-Peripheral     No growth to date.      08-06 @ 15:50 .Blood Blood-Peripheral     No growth to date.      08-05 @ 06:16 .Blood Blood-Peripheral     Growth in aerobic and anaerobic bottles: Coag Negative Staphylococcus  Single set isolate, possible contaminant. Contact  Microbiology if susceptibility testing clinically  indicated.    Growth in aerobic and anaerobic bottles: Gram Positive Cocci in Clusters    08-04 @ 07:29 .Tissue Other, sxiorium right hip     No growth    Moderate polymorphonuclear leukocytes seen per low power field  Few Gram Positive Cocci seen per oil power field    08-04 @ 07:00 .Blood Blood     No growth to date.          RADIOLOGY REVIEWED:  < from: Transesophageal Echocardiogram (07.30.18 @ 16:42) >  Patient name: MYRIAM HEATH  YOB: 1950   Age: 68 (F)   MR#: [id number]  Study Date: 7/30/2018  Location: [place] [id number]: Yg Marshall M.D.  Study quality: Technically good  Referring Physician: Rustam Rey MD  Blood Pressure:138/76 mmHg  Height: 148 cm  Weight: 51 kg  BSA: 1.4 m2  ------------------------------------------------------------------------  PROCEDURE: Transesophageal and transthoracic  echocardiograms with 2-D, M-Mode and complete spectral and  color flow Doppler were performed.  Informed consent was  first obtained for GAYATHRI. The patient was sedated - see  anesthesia record.  The procedure was monitored with  automatic blood pressure monitoring, ECG tracings and pulse  oximetry.  The transesophageal probewas placed in the  esophagus posterior to the heart without complications.  INDICATION: Sepsis, unspecified organism (A41.9)  ------------------------------------------------------------------------  Observations:  Mitral Valve: Tethered , mildly calcified mitral valve  leaflets with normal opening. Mild-moderate mitral  regurgitation.  Aortic Valve/Aorta: Sclerotic aortic valve leaflets with  normal opening. Mild aortic regurgitation.  Simple atheroma noted in aortic arch/descending aorta.  LeftAtrium: No left atrial or left atrial appendage  thrombus.  Left Ventricle: Severe global left ventricular systolic  dysfunction.  Right Heart: Normal right atrium. Right ventricular  enlargement with decreased right ventricular systolic  function. Thickened tricuspid valve. Mild-moderate  tricuspid regurgitation. Normal pulmonic valve. Mild  pulmonic regurgitation.  Pericardium/Pleura: Normal pericardium with no pericardial  effusion.  Hemodynamic: Estimated right atrial pressure is 8 mm Hg.  Estimated right ventricular systolic pressure equals 33 mm  Hg, assuming right atrial pressure equals 8 mm Hg,  consistent with normal pulmonary pressures. Color Doppler  demonstrates evidence of a patent foramen ovale.  ------------------------------------------------------------------------  Conclusions:  1. Tethered , mildly calcified mitral valve leaflets with  normal opening. Mild-moderate mitral regurgitation.  2. Sclerotic aortic valve leaflets with normal opening.  Mild aortic regurgitation.  3. Severe global left ventricular systolic dysfunction.  4. Right ventricular enlargement with decreased right  ventricular systolic function.  5. Thickened tricuspid valve. Mild-moderate tricuspid  regurgitation.  6. Color Doppler demonstrates evidence of a patent foramen  ovale.  7. No evidence of valvular vegetation is seen.  -----------------------    < end of copied text >    Assessment:  Patient with high grade sustained mrsa bacteremia after right hip hemiarthroplasty now s/p total leisa and spacer with cleared bacteremia. She is doing much better now extubated, mental status much improved, off pressors  Plan:  now on dapto and rifampin to complete another 38 days  monitor cpk  supportive care per sicu

## 2018-08-08 NOTE — PROGRESS NOTE ADULT - SUBJECTIVE AND OBJECTIVE BOX
Ortho Progress Note    S: Patient seen and examined. No acute events overnight. Pain well controlled with current regimen. Patient is more awake/alert      O:  Physical Exam:  Gen: Laying in bed, NAD, alert and oriented.   Resp: Unlabored breathing  Ext: EHL/FHL/TA/Sol intact          + SILT DP/SP/REED/Sa          +DP, extremity WWP  incisional vac in place  JPx2 in place draining SS fluid, More distal LUCY removed    Vital Signs Last 24 Hrs  T(C): 37.1 (08 Aug 2018 18:00), Max: 37.9 (08 Aug 2018 08:00)  T(F): 98.8 (08 Aug 2018 18:00), Max: 100.2 (08 Aug 2018 08:00)  HR: 123 (08 Aug 2018 20:00) (88 - 123)  BP: 204/104 (08 Aug 2018 20:00) (124/60 - 204/104)  BP(mean): 142 (08 Aug 2018 20:00) (86 - 142)  RR: 34 (08 Aug 2018 20:00) (19 - 39)  SpO2: 100% (08 Aug 2018 20:00) (68% - 100%)                          8.1    12.6  )-----------( 275      ( 08 Aug 2018 03:23 )             24.5                         8.4    14.4  )-----------( 269      ( 07 Aug 2018 14:27 )             24.2       08-08    131<L>  |  101  |  21  ----------------------------<  169<H>  4.3   |  18<L>  |  1.02

## 2018-08-08 NOTE — PROGRESS NOTE ADULT - ASSESSMENT
68F s/p R hip PJI explant and placement of cement spacer w/ ORIF distal femur  Pain control  DVT ppx  FU blood cultures  Cont abx per ID  NWB LLE  FFWB RLE  PT/OT  Cont incisional vac, likely until 8/10  Monitor drain output  Will discuss with attending

## 2018-08-08 NOTE — PROGRESS NOTE ADULT - ATTENDING COMMENTS
I examined this patient on AM SICU rounds with the multidisciplinary team.  All relevant studies reviewed.  Agree with above.  Key issues:    Acute respiratory failure s/p surgery:  successfully extubated on Monday.  Gas exchange is satisfactory and while the patient occasionally appears agitated, doubt this is related to respiratory issues.    Alzheimer's Disease - with behavioral disturbance (anxiety) we have restarted all dementia medications and are working to keep the patient calm and cooperative in the recovery process.  Acute blood loss anemia:  Hct ~ 24.5 which is stable last several days.  MRSA bacteremia - secondary to infected hip prosthesis (hemiarthroplasty) most recent blood cultures are negative and patient is on appropriate antibiotics.  Will have a "PICC" line placed for access - long term antibiotics - and remove CVL.    May be candidate for transfer to floor in the next 24 hours.

## 2018-08-08 NOTE — PROGRESS NOTE ADULT - SUBJECTIVE AND OBJECTIVE BOX
HISTORY  68 year old female with a past medical history of asthma on steroids, CVA (no residual deficits), pulmonary HTN, HLD, GERD, ulcerative colitis, fatty pancreas, impaired glucose tolerance, anxiety, and depression who presented on 7/26/2018 with altered mental status, rigors, and urinary frequency. Of note, patient was recently hospitalized for a right femoral neck fracture secondary to osteoporosis s/p hemiarthroplasty on 6/22/2018. She had been taking Plavix for her history of CVA and Lovenox for VTE prophylaxis but she developed a hematoma at her surgical site so the Lovenox was stopped. Since then, patient has been progressively more agitated, confused, and weak. Additionally, patient has been complaining of new onset low back pain and left hip pain. In the ED, patient was hemodynamically stable but noted to be hypoglycemic to the 20s so she received 2 amps of D50. She was also given 2 L of crystalloid and started on meropenem & vancomycin for concern of sepsis. CT scan revealed a 17 cm collection adjacent to the right hip prosthesis, an acute L2 compression fracture, and new left acetabular fracture. Patient was admitted to medicine but on 7/27/2018, patient was noted to be more altered, rigorous, tachypneic, febrile to 101 F, and hypotensive w/ SBP in the 60s so an RRT was called. She was given 1 L of crystalloid and started on a norepinephrine gtt. Patient subsequently admitted to SICU for hemodynamic monitoring. Decision was made to take the patient to the OR on     24 HOUR EVENTS:  - HISTORY  68 year old female with a past medical history of asthma on steroids, CVA (no residual deficits), pulmonary HTN, HLD, GERD, ulcerative colitis, fatty pancreas, impaired glucose tolerance, anxiety, and depression who presented on 7/26/2018 with altered mental status, rigors, and urinary frequency. Of note, patient was recently hospitalized for a right femoral neck fracture secondary to osteoporosis s/p hemiarthroplasty on 6/22/2018. She had been taking Plavix for her history of CVA and Lovenox for VTE prophylaxis but she developed a hematoma at her surgical site so the Lovenox was stopped. Since then, patient has been progressively more agitated, confused, and weak. Additionally, patient has been complaining of new onset low back pain and left hip pain. In the ED, patient was hemodynamically stable but noted to be hypoglycemic. She was also started on meropenem & vancomycin for concern of sepsis. CT scan revealed a 17 cm collection adjacent to the right hip prosthesis, an acute L2 compression fracture, and new left acetabular fracture. Patient was admitted to medicine but on 7/27/2018, patient was noted to be more altered, rigorous, tachypneic, febrile to 101 F, and hypotensive w/ SBP in the 60s so an RRT was called. She was more fluid and started on a norepinephrine gtt. Patient subsequently admitted to SICU for hemodynamic monitoring. Blood cultures positive for MRSA. Decision was made to take the patient to the OR on 7/27/2018 for right hip I&D and exchange of the femoral head. Despite the I&D, patient was persistently bacteremic requiring vasopressor support so she returned     24 HOUR EVENTS:  - Placed Dobhoff NG feeding tube for PO nutrition. Still awaiting formal speech and swallow evaluation.  - Glucose fingersticks noted to be inaccurately low.  - Unable to obtain peripheral IV access multiple times so right IJ central line placed. Right upper extremity noted to have clots extending into basilic vein so venous Duplex ordered.  - Dental consulted for chipped tooth #8 and recommended outpatient follow-up.    SUBJECTIVE/ROS:  [ ] A ten-point review of systems was otherwise negative except as noted.  [x] Due to altered mental status/intubation, subjective information were not able to be obtained from the patient. History was obtained, to the extent possible, from review of the chart and collateral sources of information.    NEURO  Exam: awake, intermittently agitated, oriented to self only, follows commands, can reorient occasionally  Meds:   - acetaminophen   Tablet. 975 milliGRAM(s) Oral every 6 hours PRN Mild Pain (1 - 3)  - dexmedetomidine Infusion 0.04 MICROgram(s)/kG/Hr IV Continuous <Continuous>  - HYDROmorphone  Injectable 0.5 milliGRAM(s) IV Push every 4 hours PRN Severe Breakthrough Pain  - melatonin 3 milliGRAM(s) Oral <User Schedule>  - mirtazapine 45 milliGRAM(s) Oral <User Schedule>  - oxyCODONE    IR 5 milliGRAM(s) Oral every 4 hours PRN Severe Pain (7 - 10)  - QUEtiapine 50 milliGRAM(s) Oral <User Schedule>  - sertraline 50 milliGRAM(s) Oral daily  [x] Adequacy of sedation and pain control has been assessed and adjusted    RESPIRATORY  RR: 22 (08-08-18 @ 07:00) (9 - 35)  SpO2: 100% (08-08-18 @ 07:00) (89% - 100%)  Exam: decreased bibasilar breath sounds with some rhonchi in the upper lung fields  Mechanical Ventilation: no  [N/A] Extubation Readiness Assessed  Meds:  - ALBUTerol/ipratropium for Nebulization 3 milliLiter(s) Nebulizer every 6 hours  - buDESOnide   0.5 milliGRAM(s) Respule 0.5 milliGRAM(s) Inhalation every 12 hours    CARDIOVASCULAR  HR: 111 (08-08-18 @ 07:00) (88 - 111)  BP: 177/79 (08-08-18 @ 07:00) (124/59 - 177/79)  BP(mean): 113 (08-08-18 @ 07:00) (85 - 124)  Exam: regular rate and rhythm, S1S2  Cardiac Rhythm: sinus  Perfusion    [x]Adequate    [ ]Inadequate  Mentation   [ ]Normal       [x]Reduced (dementia at baseline)  Extremities  [x]Warm         [ ]Cool  Volume Status [x]Hypervolemic [ ]Euvolemic [ ]Hypovolemic  Meds: none    GI/NUTRITION  Exam: soft, nontender, nondistended  Diet: NPO with Jevity at 10 mL/hr via NG tube  Meds: famotidine    Tablet 20 milliGRAM(s) Oral two times a day    GENITOURINARY  I&O's Detail    08-07 @ 07:01  -  08-08 @ 07:00  --------------------------------------------------------  IN:    dexmedetomidine Infusion: 289.8 mL    dextrose 5% + sodium chloride 0.45%.: 60 mL    dextrose 5% + sodium chloride 0.9%: 30 mL    IV PiggyBack: 150 mL    Pivot: 360 mL  Total IN: 889.8 mL    OUT:    Accordian: 35 mL    Accordian: 100 mL    Indwelling Catheter - Urethral: 1505 mL  Total OUT: 1640 mL    Total NET: -750.2 mL      131<L>  |  101  |  21  ----------------------------<  169<H>  4.3   |  18<L>  |  1.02    Ca    7.2<L>      08 Aug 2018 03:23  Phos  3.2  Mg     1.7    [x] Blackman catheter, indication: immobilization due to bilateral acetabular fractures  Meds: none    HEMATOLOGIC  Meds: enoxaparin Injectable 40 milliGRAM(s) SubCutaneous daily  [x] VTE Prophylaxis                        8.1    12.6  )-----------( 275      ( 08 Aug 2018 03:23 )             24.5     INFECTIOUS DISEASES  T(C): 36.8 (08-08-18 @ 03:00), Max: 37.1 (08-07-18 @ 23:00)  WBC Count:  - 12.6 K/uL (08-08 @ 03:23)  - 14.4 K/uL (08-07 @ 14:27)    Recent Cultures:    Specimen Source: .Blood Blood-Peripheral, 08-06 @ 15:50  Results: No growth to date.; Gram Stain: --; Organism: --    Specimen Source: .Blood Blood-Peripheral, 08-05 @ 06:16  Results: Growth in aerobic and anaerobic bottles: Coag Negative Staphylococcus (Single set isolate, possible contaminant)  Gram Stain: Growth in aerobic and anaerobic bottles: Gram Positive Cocci in Clusters    Specimen Source: .Tissue Other, sxiorium right hip, 08-04 @ 07:29  Results: No growth  Gram Stain: Moderate polymorphonuclear leukocytes seen per low power field; Few Gram Positive Cocci seen per oil power field; Organism: --    Specimen Source: .Blood Blood, 08-04 @ 07:00;  Results: No growth to date.; Gram Stain: --; Organism: --    Specimen Source: .Blood Blood-Peripheral, 08-01 @ 08:47  Results: Growth in anaerobic bottle: Methicillin resistant Staphylococcus aureus  Gram Stain: Growth in anaerobic bottle: Gram Positive Cocci in Clusters; Organism: --    Meds:  - Ceftaroline Fosamil IVPB 600 milliGRAM(s) IV Intermittent every 12 hours  - DAPTOmycin IVPB 500 milliGRAM(s) IV Intermittent every 24 hours    ENDOCRINE  Capillary Blood Glucose:  - 210 mg/dL (08 Aug 2018 05:45)  - 100 mg/dL (07 Aug 2018 21:56)  - 123 mg/dL (07 Aug 2018 20:09)  - 80 mg/dL (07 Aug 2018 13:52)  - 103 mg/dL (07 Aug 2018 09:22)    Meds:  - insulin lispro (HumaLOG) corrective regimen sliding scale   SubCutaneous every 4 hours  - predniSONE   Tablet 10 milliGRAM(s) Oral every 24 hours    ACCESS DEVICES:  [x] Peripheral IV  [x] Central Venous Line	[x] R	[ ] L	[x] IJ	[ ] Fem	[ ] SC	Placed: 8/8/2018  [ ] Arterial Line		[ ] R	[ ] L	[ ] Fem	[ ] Rad	[ ] Ax	Placed:   [ ] PICC:					[ ] Mediport  [x] Urinary Catheter, Date Placed: 7/26/2018  [x] Necessity of urinary, arterial, and venous catheters discussed    OTHER MEDICATIONS:  - chlorhexidine 4% Liquid 1 Application(s) Topical <User Schedule>  - FIRST- Mouthwash  BLM 5 milliLiter(s) Swish and Spit daily  - lidocaine   Patch 1 Patch Transdermal every 24 hours    CODE STATUS: Full code.    IMAGING: HISTORY  68 year old female with a past medical history of asthma on steroids, CVA (no residual deficits), pulmonary HTN, HLD, GERD, ulcerative colitis, fatty pancreas, impaired glucose tolerance, anxiety, and depression who presented on 7/26/2018 with altered mental status, rigors, and urinary frequency. Of note, patient was recently hospitalized for a right femoral neck fracture secondary to osteoporosis s/p hemiarthroplasty on 6/22/2018. She had been taking Plavix for her history of CVA and Lovenox for VTE prophylaxis but she developed a hematoma at her surgical site so the Lovenox was stopped. Since then, patient has been progressively more agitated, confused, and weak. Additionally, patient has been complaining of new onset low back pain and left hip pain. In the ED, patient was hemodynamically stable but noted to be hypoglycemic. She was also started on meropenem & vancomycin for concern of sepsis. CT scan revealed a 17 cm collection adjacent to the right hip prosthesis, an acute L2 compression fracture, and new left acetabular fracture. Patient was admitted to medicine but on 7/27/2018, patient was noted to be more altered, rigorous, tachypneic, febrile to 101 F, and hypotensive w/ SBP in the 60s so an RRT was called. She was more fluid and started on a norepinephrine gtt. Patient subsequently admitted to SICU for hemodynamic monitoring. Blood cultures positive for MRSA. Decision was made to take the patient to the OR on 7/27/2018 for right hip I&D and exchange of the femoral head. She returned to the SICU intubated on vasopressor support. She was extubated on 7/30/2018 but was reintubated on 8/2/2018. Additionally, patient was persistently on vasopressor support and bacteremic with MRSA. Patient was subsequently taken back to the OR on 8/4/2018 for further right hip I&D, explantation of right hemiarthroplasty, placement of antibiotic spacer, and right femur ORIF. Patient was subsequently weaned off vasopressor support on 8/5/2018 and successfully extubated on 8/6/2018.    24 HOUR EVENTS:  - Placed Dobhoff NG feeding tube for PO nutrition. Still awaiting formal speech and swallow evaluation.  - Glucose fingersticks noted to be inaccurately low.  - Unable to obtain peripheral IV access multiple times so right IJ central line placed. Right upper extremity noted to have clots extending into basilic vein so venous Duplex ordered.  - Dental consulted for chipped tooth #8 and recommended outpatient follow-up.    SUBJECTIVE/ROS:  [ ] A ten-point review of systems was otherwise negative except as noted.  [x] Due to altered mental status/intubation, subjective information were not able to be obtained from the patient. History was obtained, to the extent possible, from review of the chart and collateral sources of information.    NEURO  Exam: awake, intermittently agitated, oriented to self only, follows commands, can reorient occasionally  Meds:   - acetaminophen   Tablet. 975 milliGRAM(s) Oral every 6 hours PRN Mild Pain (1 - 3)  - dexmedetomidine Infusion 0.04 MICROgram(s)/kG/Hr IV Continuous <Continuous>  - HYDROmorphone  Injectable 0.5 milliGRAM(s) IV Push every 4 hours PRN Severe Breakthrough Pain  - melatonin 3 milliGRAM(s) Oral <User Schedule>  - mirtazapine 45 milliGRAM(s) Oral <User Schedule>  - oxyCODONE    IR 5 milliGRAM(s) Oral every 4 hours PRN Severe Pain (7 - 10)  - QUEtiapine 50 milliGRAM(s) Oral <User Schedule>  - sertraline 50 milliGRAM(s) Oral daily  [x] Adequacy of sedation and pain control has been assessed and adjusted    RESPIRATORY  RR: 22 (08-08-18 @ 07:00) (9 - 35)  SpO2: 100% (08-08-18 @ 07:00) (89% - 100%)  Exam: decreased bibasilar breath sounds with some rhonchi in the upper lung fields  Mechanical Ventilation: no  [N/A] Extubation Readiness Assessed  Meds:  - ALBUTerol/ipratropium for Nebulization 3 milliLiter(s) Nebulizer every 6 hours  - buDESOnide   0.5 milliGRAM(s) Respule 0.5 milliGRAM(s) Inhalation every 12 hours  - predniSONE   Tablet 10 milliGRAM(s) Oral every 24 hours    CARDIOVASCULAR  HR: 111 (08-08-18 @ 07:00) (88 - 111)  BP: 177/79 (08-08-18 @ 07:00) (124/59 - 177/79)  BP(mean): 113 (08-08-18 @ 07:00) (85 - 124)  Exam: regular rate and rhythm, S1S2  Cardiac Rhythm: sinus  Perfusion    [x]Adequate    [ ]Inadequate  Mentation   [ ]Normal       [x]Reduced (dementia at baseline)  Extremities  [x]Warm         [ ]Cool  Volume Status [x]Hypervolemic [ ]Euvolemic [ ]Hypovolemic  Meds: none    GI/NUTRITION  Exam: soft, nontender, nondistended  Diet: NPO with Jevity at 10 mL/hr via NG tube  Meds: famotidine    Tablet 20 milliGRAM(s) Oral two times a day    GENITOURINARY  I&O's Detail    08-07 @ 07:01  -  08-08 @ 07:00  --------------------------------------------------------  IN:    dexmedetomidine Infusion: 289.8 mL    dextrose 5% + sodium chloride 0.45%.: 60 mL    dextrose 5% + sodium chloride 0.9%: 30 mL    IV PiggyBack: 150 mL    Pivot: 360 mL  Total IN: 889.8 mL    OUT:    Accordian: 35 mL    Accordian: 100 mL    Indwelling Catheter - Urethral: 1505 mL  Total OUT: 1640 mL    Total NET: -750.2 mL      131<L>  |  101  |  21  ----------------------------<  169<H>  4.3   |  18<L>  |  1.02    Ca    7.2<L>      08 Aug 2018 03:23  Phos  3.2  Mg     1.7    [x] Blackman catheter, indication: immobilization due to bilateral acetabular fractures  Meds: none    HEMATOLOGIC  Meds: enoxaparin Injectable 40 milliGRAM(s) SubCutaneous daily  [x] VTE Prophylaxis                        8.1    12.6  )-----------( 275      ( 08 Aug 2018 03:23 )             24.5     INFECTIOUS DISEASES  T(C): 36.8 (08-08-18 @ 03:00), Max: 37.1 (08-07-18 @ 23:00)  WBC Count:  - 12.6 K/uL (08-08 @ 03:23)  - 14.4 K/uL (08-07 @ 14:27)    Recent Cultures:    Specimen Source: .Blood Blood-Peripheral, 08-06 @ 15:50  Results: No growth to date.; Gram Stain: --; Organism: --    Specimen Source: .Blood Blood-Peripheral, 08-05 @ 06:16  Results: Growth in aerobic and anaerobic bottles: Coag Negative Staphylococcus (Single set isolate, possible contaminant)  Gram Stain: Growth in aerobic and anaerobic bottles: Gram Positive Cocci in Clusters    Specimen Source: .Tissue Other, sxiorium right hip, 08-04 @ 07:29  Results: No growth  Gram Stain: Moderate polymorphonuclear leukocytes seen per low power field; Few Gram Positive Cocci seen per oil power field; Organism: --    Specimen Source: .Blood Blood, 08-04 @ 07:00;  Results: No growth to date.; Gram Stain: --; Organism: --    Specimen Source: .Blood Blood-Peripheral, 08-01 @ 08:47  Results: Growth in anaerobic bottle: Methicillin resistant Staphylococcus aureus  Gram Stain: Growth in anaerobic bottle: Gram Positive Cocci in Clusters; Organism: --    Meds:  - Ceftaroline Fosamil IVPB 600 milliGRAM(s) IV Intermittent every 12 hours  - DAPTOmycin IVPB 500 milliGRAM(s) IV Intermittent every 24 hours    ENDOCRINE  Capillary Blood Glucose:  - 210 mg/dL (08 Aug 2018 05:45)  - 100 mg/dL (07 Aug 2018 21:56)  - 123 mg/dL (07 Aug 2018 20:09)  - 80 mg/dL (07 Aug 2018 13:52)  - 103 mg/dL (07 Aug 2018 09:22)    Meds: insulin lispro (HumaLOG) corrective regimen sliding scale   SubCutaneous every 4 hours      ACCESS DEVICES:  [x] Peripheral IV  [x] Central Venous Line	[x] R	[ ] L	[x] IJ	[ ] Fem	[ ] SC	Placed: 8/8/2018  [ ] Arterial Line		[ ] R	[ ] L	[ ] Fem	[ ] Rad	[ ] Ax	Placed:   [ ] PICC:					[ ] Mediport  [x] Urinary Catheter, Date Placed: 7/26/2018  [x] Necessity of urinary, arterial, and venous catheters discussed    OTHER MEDICATIONS:  - chlorhexidine 4% Liquid 1 Application(s) Topical <User Schedule>  - FIRST- Mouthwash  BLM 5 milliLiter(s) Swish and Spit daily  - lidocaine   Patch 1 Patch Transdermal every 24 hours    CODE STATUS: Full code.    IMAGING:

## 2018-08-09 DIAGNOSIS — F41.9 ANXIETY DISORDER, UNSPECIFIED: ICD-10-CM

## 2018-08-09 LAB
ANION GAP SERPL CALC-SCNC: 9 MMOL/L — SIGNIFICANT CHANGE UP (ref 5–17)
APPEARANCE UR: CLEAR — SIGNIFICANT CHANGE UP
BILIRUB UR-MCNC: NEGATIVE — SIGNIFICANT CHANGE UP
BLD GP AB SCN SERPL QL: NEGATIVE — SIGNIFICANT CHANGE UP
BUN SERPL-MCNC: 23 MG/DL — SIGNIFICANT CHANGE UP (ref 7–23)
CA-I BLD-SCNC: 1.12 MMOL/L — SIGNIFICANT CHANGE UP (ref 1.12–1.3)
CALCIUM SERPL-MCNC: 7.1 MG/DL — LOW (ref 8.4–10.5)
CHLORIDE SERPL-SCNC: 103 MMOL/L — SIGNIFICANT CHANGE UP (ref 96–108)
CK SERPL-CCNC: 377 U/L — HIGH (ref 25–170)
CO2 SERPL-SCNC: 22 MMOL/L — SIGNIFICANT CHANGE UP (ref 22–31)
COLOR SPEC: YELLOW — SIGNIFICANT CHANGE UP
CREAT SERPL-MCNC: 1 MG/DL — SIGNIFICANT CHANGE UP (ref 0.5–1.3)
CULTURE RESULTS: SIGNIFICANT CHANGE UP
DIFF PNL FLD: NEGATIVE — SIGNIFICANT CHANGE UP
GLUCOSE SERPL-MCNC: 165 MG/DL — HIGH (ref 70–99)
GLUCOSE UR QL: NEGATIVE — SIGNIFICANT CHANGE UP
HCT VFR BLD CALC: 22.5 % — LOW (ref 34.5–45)
HCT VFR BLD CALC: 29.1 % — LOW (ref 34.5–45)
HGB BLD-MCNC: 7.4 G/DL — LOW (ref 11.5–15.5)
HGB BLD-MCNC: 9.2 G/DL — LOW (ref 11.5–15.5)
KETONES UR-MCNC: NEGATIVE — SIGNIFICANT CHANGE UP
LEUKOCYTE ESTERASE UR-ACNC: ABNORMAL
MAGNESIUM SERPL-MCNC: 2 MG/DL — SIGNIFICANT CHANGE UP (ref 1.6–2.6)
MCHC RBC-ENTMCNC: 28.6 PG — SIGNIFICANT CHANGE UP (ref 27–34)
MCHC RBC-ENTMCNC: 30 PG — SIGNIFICANT CHANGE UP (ref 27–34)
MCHC RBC-ENTMCNC: 31.8 GM/DL — LOW (ref 32–36)
MCHC RBC-ENTMCNC: 33 GM/DL — SIGNIFICANT CHANGE UP (ref 32–36)
MCV RBC AUTO: 90.2 FL — SIGNIFICANT CHANGE UP (ref 80–100)
MCV RBC AUTO: 91.1 FL — SIGNIFICANT CHANGE UP (ref 80–100)
NITRITE UR-MCNC: NEGATIVE — SIGNIFICANT CHANGE UP
PH UR: 6 — SIGNIFICANT CHANGE UP (ref 5–8)
PHOSPHATE SERPL-MCNC: 3.1 MG/DL — SIGNIFICANT CHANGE UP (ref 2.5–4.5)
PLATELET # BLD AUTO: 258 K/UL — SIGNIFICANT CHANGE UP (ref 150–400)
PLATELET # BLD AUTO: 334 K/UL — SIGNIFICANT CHANGE UP (ref 150–400)
POTASSIUM SERPL-MCNC: 3.8 MMOL/L — SIGNIFICANT CHANGE UP (ref 3.5–5.3)
POTASSIUM SERPL-SCNC: 3.8 MMOL/L — SIGNIFICANT CHANGE UP (ref 3.5–5.3)
PROT UR-MCNC: NEGATIVE — SIGNIFICANT CHANGE UP
RBC # BLD: 2.47 M/UL — LOW (ref 3.8–5.2)
RBC # BLD: 3.23 M/UL — LOW (ref 3.8–5.2)
RBC # FLD: 15.2 % — HIGH (ref 10.3–14.5)
RBC # FLD: 15.3 % — HIGH (ref 10.3–14.5)
RH IG SCN BLD-IMP: NEGATIVE — SIGNIFICANT CHANGE UP
SODIUM SERPL-SCNC: 134 MMOL/L — LOW (ref 135–145)
SP GR SPEC: 1.01 — LOW (ref 1.01–1.02)
SPECIMEN SOURCE: SIGNIFICANT CHANGE UP
UROBILINOGEN FLD QL: NEGATIVE — SIGNIFICANT CHANGE UP
WBC # BLD: 15.5 K/UL — HIGH (ref 3.8–10.5)
WBC # BLD: 8.8 K/UL — SIGNIFICANT CHANGE UP (ref 3.8–10.5)
WBC # FLD AUTO: 15.5 K/UL — HIGH (ref 3.8–10.5)
WBC # FLD AUTO: 8.8 K/UL — SIGNIFICANT CHANGE UP (ref 3.8–10.5)

## 2018-08-09 PROCEDURE — 71045 X-RAY EXAM CHEST 1 VIEW: CPT | Mod: 26,76

## 2018-08-09 PROCEDURE — 99233 SBSQ HOSP IP/OBS HIGH 50: CPT | Mod: GC

## 2018-08-09 RX ORDER — CLONAZEPAM 1 MG
2 TABLET ORAL AT BEDTIME
Qty: 0 | Refills: 0 | Status: DISCONTINUED | OUTPATIENT
Start: 2018-08-09 | End: 2018-08-10

## 2018-08-09 RX ORDER — POTASSIUM CHLORIDE 20 MEQ
20 PACKET (EA) ORAL ONCE
Qty: 0 | Refills: 0 | Status: COMPLETED | OUTPATIENT
Start: 2018-08-09 | End: 2018-08-09

## 2018-08-09 RX ORDER — DIPHENHYDRAMINE HCL 50 MG
25 CAPSULE ORAL EVERY 6 HOURS
Qty: 0 | Refills: 0 | Status: COMPLETED | OUTPATIENT
Start: 2018-08-09 | End: 2018-08-10

## 2018-08-09 RX ORDER — CEFTRIAXONE 500 MG/1
INJECTION, POWDER, FOR SOLUTION INTRAMUSCULAR; INTRAVENOUS
Qty: 0 | Refills: 0 | Status: DISCONTINUED | OUTPATIENT
Start: 2018-08-09 | End: 2018-08-09

## 2018-08-09 RX ORDER — FUROSEMIDE 40 MG
20 TABLET ORAL ONCE
Qty: 0 | Refills: 0 | Status: COMPLETED | OUTPATIENT
Start: 2018-08-09 | End: 2018-08-09

## 2018-08-09 RX ORDER — DEXMEDETOMIDINE HYDROCHLORIDE IN 0.9% SODIUM CHLORIDE 4 UG/ML
0.5 INJECTION INTRAVENOUS
Qty: 200 | Refills: 0 | Status: DISCONTINUED | OUTPATIENT
Start: 2018-08-09 | End: 2018-08-10

## 2018-08-09 RX ADMIN — Medication 0.25 MILLIGRAM(S): at 03:05

## 2018-08-09 RX ADMIN — HYDROMORPHONE HYDROCHLORIDE 0.5 MILLIGRAM(S): 2 INJECTION INTRAMUSCULAR; INTRAVENOUS; SUBCUTANEOUS at 09:15

## 2018-08-09 RX ADMIN — SERTRALINE 50 MILLIGRAM(S): 25 TABLET, FILM COATED ORAL at 12:02

## 2018-08-09 RX ADMIN — Medication 0.5 MILLIGRAM(S): at 06:23

## 2018-08-09 RX ADMIN — OXYCODONE HYDROCHLORIDE 5 MILLIGRAM(S): 5 TABLET ORAL at 02:47

## 2018-08-09 RX ADMIN — MIRTAZAPINE 45 MILLIGRAM(S): 45 TABLET, ORALLY DISINTEGRATING ORAL at 22:07

## 2018-08-09 RX ADMIN — OXYCODONE HYDROCHLORIDE 5 MILLIGRAM(S): 5 TABLET ORAL at 07:10

## 2018-08-09 RX ADMIN — DEXMEDETOMIDINE HYDROCHLORIDE IN 0.9% SODIUM CHLORIDE 6.3 MICROGRAM(S)/KG/HR: 4 INJECTION INTRAVENOUS at 23:06

## 2018-08-09 RX ADMIN — HYDROMORPHONE HYDROCHLORIDE 0.5 MILLIGRAM(S): 2 INJECTION INTRAMUSCULAR; INTRAVENOUS; SUBCUTANEOUS at 14:30

## 2018-08-09 RX ADMIN — Medication 0.5 MILLIGRAM(S): at 17:37

## 2018-08-09 RX ADMIN — Medication 3 MILLILITER(S): at 11:59

## 2018-08-09 RX ADMIN — HYDROMORPHONE HYDROCHLORIDE 0.5 MILLIGRAM(S): 2 INJECTION INTRAMUSCULAR; INTRAVENOUS; SUBCUTANEOUS at 14:45

## 2018-08-09 RX ADMIN — Medication 975 MILLIGRAM(S): at 14:30

## 2018-08-09 RX ADMIN — LIDOCAINE 1 PATCH: 4 CREAM TOPICAL at 10:01

## 2018-08-09 RX ADMIN — Medication 20 MILLIEQUIVALENT(S): at 06:30

## 2018-08-09 RX ADMIN — FAMOTIDINE 20 MILLIGRAM(S): 10 INJECTION INTRAVENOUS at 06:30

## 2018-08-09 RX ADMIN — Medication 3 MILLIGRAM(S): at 22:07

## 2018-08-09 RX ADMIN — Medication 3 MILLILITER(S): at 17:37

## 2018-08-09 RX ADMIN — Medication 2 MILLIGRAM(S): at 21:09

## 2018-08-09 RX ADMIN — LIDOCAINE 1 PATCH: 4 CREAM TOPICAL at 05:39

## 2018-08-09 RX ADMIN — OXYCODONE HYDROCHLORIDE 5 MILLIGRAM(S): 5 TABLET ORAL at 06:37

## 2018-08-09 RX ADMIN — Medication 3 MILLILITER(S): at 06:23

## 2018-08-09 RX ADMIN — ENOXAPARIN SODIUM 40 MILLIGRAM(S): 100 INJECTION SUBCUTANEOUS at 12:00

## 2018-08-09 RX ADMIN — LIDOCAINE 1 PATCH: 4 CREAM TOPICAL at 22:00

## 2018-08-09 RX ADMIN — HYDROMORPHONE HYDROCHLORIDE 0.5 MILLIGRAM(S): 2 INJECTION INTRAMUSCULAR; INTRAVENOUS; SUBCUTANEOUS at 08:56

## 2018-08-09 RX ADMIN — Medication 25 MILLIGRAM(S): at 20:13

## 2018-08-09 RX ADMIN — Medication 20 MILLIGRAM(S): at 14:30

## 2018-08-09 RX ADMIN — DIPHENHYDRAMINE HYDROCHLORIDE AND LIDOCAINE HYDROCHLORIDE AND ALUMINUM HYDROXIDE AND MAGNESIUM HYDRO 5 MILLILITER(S): KIT at 12:00

## 2018-08-09 RX ADMIN — CHLORHEXIDINE GLUCONATE 1 APPLICATION(S): 213 SOLUTION TOPICAL at 06:15

## 2018-08-09 RX ADMIN — OXYCODONE HYDROCHLORIDE 5 MILLIGRAM(S): 5 TABLET ORAL at 01:14

## 2018-08-09 RX ADMIN — FAMOTIDINE 20 MILLIGRAM(S): 10 INJECTION INTRAVENOUS at 17:32

## 2018-08-09 RX ADMIN — DAPTOMYCIN 120 MILLIGRAM(S): 500 INJECTION, POWDER, LYOPHILIZED, FOR SOLUTION INTRAVENOUS at 16:30

## 2018-08-09 RX ADMIN — QUETIAPINE FUMARATE 50 MILLIGRAM(S): 200 TABLET, FILM COATED ORAL at 22:07

## 2018-08-09 RX ADMIN — Medication 10 MILLIGRAM(S): at 02:46

## 2018-08-09 RX ADMIN — Medication 975 MILLIGRAM(S): at 14:45

## 2018-08-09 NOTE — PROGRESS NOTE ADULT - ASSESSMENT
ASSESSMENT:  68 year old female presenting with septic shock from MRSA bacteremia secondary to infected right hemiarthroplasty hardware s/p right hip I&D, explantation of right hemiarthroplasty, placement of antibiotic spacer, and right femur ORIF.    PLAN:    Neuro: acute post-op pain, anxiety, depression, dementia  - Monitor mental status.  - Sedation with Precedex for agitation. Home regimen of Zoloft, Seroquel, Remeron, and melatonin for agitation related to her dementia.  - Pain control with Tylenol, oxycodone, and Dilaudid.    Resp: asthma, atelectasis  - Monitor pulse oximeter.  - Out of bed to chair, incentive spirometry, and aggressive chest PT to prevent atelectasis and facilitate secretion mobilization.  - Home prednisone 10 mg daily, Pulmicort, and Duoneb for asthma.    CV: distributive shock (resolved)  - Monitor vital signs.    GI: no acute issues  - NPO with tube feeds. Will advance Jevity to goal of 45 mL/hr.  - Famotidine for GERD.  - Bowel regimen with Colace & senna.    Renal: CKD stage 2, hyponatremia  - Monitor I&Os.  - Monitor electrolytes and replete as necessary.  - Assess need for diuresis.    Heme: no acute issues  - Lovenox for VTE prophylaxis.    ID: MRSA bacteremia, infected right hip s/p I&D, explantation of hardware, & placement of antibiotic spacer  - Monitor WBC, temperature, and procalcitonin.  - Follow up OR and repeat blood cultures. Reculture until patient is no longer bacteremic. Blood cultures from 8/6/2018 currently no growth to date.  - Daptomycin with ceftaroline for MRSA bacteremia. Appreciate ID recommendations.    Endo: hyperglycemia  - Monitor fingersticks q4hrs for hypoglycemia.    Disposition:  - Full code.  - Will remain in SICU for respiratory monitoring.    Cynthia Wilkins PA-C    [id number]    Attending Attestation:   I examined this patient on AM SICU rounds with the multidisciplinary team.  All relevant studies reviewed.  Agree with above.  Key issues:    Acute respiratory failure s/p surgery:  successfully extubated on Monday.  Gas exchange is satisfactory and while the patient occasionally appears agitated, doubt this is related to respiratory issues.    Alzheimer's Disease - with behavioral disturbance (anxiety) we have restarted all dementia medications and are working to keep the patient calm and cooperative in the recovery process.  Acute blood loss anemia:  Hct ~ 24.5 which is stable last several days.  MRSA bacteremia - secondary to infected hip prosthesis (hemiarthroplasty) most recent blood cultures are negative and patient is on appropriate antibiotics.  Will have a "PICC" line placed for access - long term antibiotics - and remove CVL.    May be candidate for transfer to floor in the next 24 hours. ASSESSMENT:  68 year old female presenting with septic shock from MRSA bacteremia secondary to infected right hemiarthroplasty hardware s/p right hip I&D, explantation of right hemiarthroplasty, placement of antibiotic spacer, and right femur ORIF.    PLAN:    Neuro: acute post-op pain, anxiety, depression, dementia  - Monitor mental status.  - Home regimen of Zoloft, Seroquel, Remeron, and melatonin for agitation related to her dementia. Patient takes clonazepam at home. Would discontinue Ativan and restart home clonazepam for anxiety.  - Pain control with Tylenol, oxycodone, and Dilaudid.    Resp: asthma, atelectasis  - Monitor pulse oximeter.  - Out of bed to chair, incentive spirometry, and aggressive chest PT to prevent atelectasis and facilitate secretion mobilization.  - Home prednisone 10 mg daily, Pulmicort, and Duoneb for asthma.    CV: distributive shock (resolved)  - Monitor vital signs.    GI: no acute issues  - NPO with tube feeds. Will advance Jevity to goal of 45 mL/hr.  - Famotidine for GERD.  - Bowel regimen with Colace & senna.    Renal: CKD stage 2, hyponatremia  - Monitor I&Os.  - Monitor electrolytes and replete as necessary.  - Assess need for diuresis.    Heme: no acute issues  - Lovenox for VTE prophylaxis.    ID: MRSA bacteremia, infected right hip s/p I&D, explantation of hardware, & placement of antibiotic spacer  - Monitor WBC, temperature, and procalcitonin.  - Follow up OR and repeat blood cultures. Reculture until patient is no longer bacteremic. Blood cultures from 8/6/2018 currently no growth to date.  - Daptomycin with rifampin for MRSA bacteremia. Appreciate ID recommendations.  - Awaiting PICC placement for long-term IV antibiotics.    Endo: hyperglycemia  - Monitor fingersticks q4hrs for hypoglycemia.    Disposition:  - Full code.  - Will remain in SICU for respiratory monitoring.    Cynthia Wilkins PA-C    [id number]

## 2018-08-09 NOTE — OCCUPATIONAL THERAPY INITIAL EVALUATION ADULT - DIAGNOSIS, OT EVAL
Pt demonstrated decreased strength, balance, cognition impacting pt's ability to participate in functional mobility and ADLs.

## 2018-08-09 NOTE — PROGRESS NOTE ADULT - SUBJECTIVE AND OBJECTIVE BOX
CC: f/u for  mrsa bacteremia and infected hip  Patient reports  she feels ok today  REVIEW OF SYSTEMS:  All other review of systems negative (Comprehensive ROS)    Antimicrobials Day #  :  [id number]   IVPB      DAPTOmycin IVPB 500 milliGRAM(s) IV Intermittent every 24 hours  Day   rifampin IVPB 600 milliGRAM(s) IV Intermittent every 24 hours  rifampin IVPB        Other Medications Reviewed    T(F): 100.3 (18 @ 19:00), Max: 102 (18 @ 15:00)  HR: 120 (18 @ 19:00)  BP: 137/71 (18 @ 19:00)  RR: 15 (18 @ 19:00)  SpO2: 100% (18 @ 19:00)  Wt(kg): --    PHYSICAL EXAM:  General: alert, no acute distress  Eyes:  anicteric, no conjunctival injection, no discharge  Oropharynx: no lesions or injection 	  Neck: supple, without adenopathy  Lungs: course  to auscultation  Heart: regular rate and rhythm; no murmur, rubs or gallops  Abdomen: soft, nondistended, nontender, without mass or organomegaly  Skin: diffuse rash  Extremities: edema  Neurologic: alert  moves all extremities    LAB RESULTS:                        7.4    8.8   )-----------( 258      ( 09 Aug 2018 03:38 )             22.5     -    134<L>  |  103  |  23  ----------------------------<  165<H>  3.8   |  22  |  1.00    Ca    7.1<L>      09 Aug 2018 03:38  Phos  3.1     -  Mg     2.0     -        Urinalysis Basic - ( 09 Aug 2018 16:40 )    Color: Yellow / Appearance: Clear / S.007 / pH: x  Gluc: x / Ketone: Negative  / Bili: Negative / Urobili: Negative   Blood: x / Protein: Negative / Nitrite: Negative   Leuk Esterase: Large / RBC: 0-2 /HPF / WBC >50 /HPF   Sq Epi: x / Non Sq Epi: x / Bacteria: x      MICROBIOLOGY:  RECENT CULTURES:   @ 08:49 .Blood Blood     No growth to date.       @ 09:00 .Blood Blood-Peripheral     No growth to date.       @ 15:50 .Blood Blood-Peripheral     No growth to date.       @ 06:16 .Blood Blood-Peripheral     Growth in aerobic and anaerobic bottles: Coag Negative Staphylococcus  Single set isolate, possible contaminant. Contact  Microbiology if susceptibility testing clinically  indicated.    Growth in aerobic and anaerobic bottles: Gram Positive Cocci in Clusters        RADIOLOGY REVIEWED:    < from: Xray Chest 1 View- PORTABLE-Urgent (18 @ 09:02) >  IMPRESSION:     The enteric tube is in place with its tip in the stomach.    Minimal improvement of the hazy opacities in the right mid to upper lung.        < end of copied text >      Assessment:  Patient with mrsa bacteremia from infected right hip hemiarthroplasty now s/p leisa and spacer with cleared bacteremia . She has a rash and fever now less than 24 hours after stopping ceftaroline and suspect drug rash and fever to cephalosporin. I am doubtful she has uti, just gavin changes. Suspect fluid and atelectasis more than pneumonia.   Plan: For now continue dapto and rifampin  favor avoiding another cephalosporin and betalactams for now  if any further fever or worsening could give a dose of amikacin pending further data  follow up blood and urine cultures  continue supportive care

## 2018-08-09 NOTE — OCCUPATIONAL THERAPY INITIAL EVALUATION ADULT - LIVES WITH, PROFILE
Per PT note: Prior to hospitalization pt residing in a private home in [place], [place] with her spouse and 2 daughters. Patient independent with ADLs however family shared that she needs assistance at times with ambulation prior to admission.

## 2018-08-09 NOTE — PROGRESS NOTE ADULT - SUBJECTIVE AND OBJECTIVE BOX
---___---___---___---___---___---___ ---___---___---___---___---___---___---___---___---___---                    <<<  M E D I C A L   A T T E N D I N G    F O L L O W    U P   N O T E  >>>    manny is  still in icu . has fever      ---___---___---___---___---___---      <<<  MEDICATIONS:  >>>    MEDICATIONS  (STANDING):  ALBUTerol/ipratropium for Nebulization 3 milliLiter(s) Nebulizer every 6 hours  buDESOnide   0.5 milliGRAM(s) Respule 0.5 milliGRAM(s) Inhalation every 12 hours  chlorhexidine 4% Liquid 1 Application(s) Topical <User Schedule>  clonazePAM Tablet 2 milliGRAM(s) Oral at bedtime  DAPTOmycin IVPB 500 milliGRAM(s) IV Intermittent every 24 hours  enoxaparin Injectable 40 milliGRAM(s) SubCutaneous daily  famotidine    Tablet 20 milliGRAM(s) Oral two times a day  FIRST- Mouthwash  BLM 5 milliLiter(s) Swish and Spit daily  furosemide   Injectable 20 milliGRAM(s) IV Push once  insulin lispro (HumaLOG) corrective regimen sliding scale   SubCutaneous every 4 hours  lidocaine   Patch 1 Patch Transdermal every 24 hours  melatonin 3 milliGRAM(s) Oral <User Schedule>  mirtazapine 45 milliGRAM(s) Oral <User Schedule>  predniSONE   Tablet 10 milliGRAM(s) Oral every 24 hours  QUEtiapine 50 milliGRAM(s) Oral <User Schedule>  rifampin IVPB 600 milliGRAM(s) IV Intermittent every 24 hours  rifampin IVPB      sertraline 50 milliGRAM(s) Oral daily      MEDICATIONS  (PRN):  acetaminophen   Tablet. 975 milliGRAM(s) Oral every 6 hours PRN Mild Pain (1 - 3)  HYDROmorphone  Injectable 0.5 milliGRAM(s) IV Push every 4 hours PRN Severe Breakthrough Pain  oxyCODONE    IR 5 milliGRAM(s) Oral every 4 hours PRN Severe Pain (7 - 10)       ---___---___---___---___---___---     <<<REVIEW OF SYSTEM: >>>    unable to obtain      ---___---___---___---___---___---          <<<  VITAL SIGNS: >>>    T(F): 100.6 (08-09-18 @ 14:00), Max: 100.6 (08-09-18 @ 14:00)  HR: 126 (08-09-18 @ 14:00) (85 - 126)  BP: 171/105 (08-09-18 @ 14:00) (95/51 - 204/104)  RR: 30 (08-09-18 @ 14:00) (19 - 37)  SpO2: 100% (08-09-18 @ 14:00) (97% - 100%)  Wt(kg): --  CAPILLARY BLOOD GLUCOSE      POCT Blood Glucose.: 122 mg/dL (09 Aug 2018 15:11)    I&O's Summary    08 Aug 2018 07:01  -  09 Aug 2018 07:00  --------------------------------------------------------  IN: 1013.6 mL / OUT: 1215 mL / NET: -201.4 mL    09 Aug 2018 07:01  -  09 Aug 2018 15:37  --------------------------------------------------------  IN: 740 mL / OUT: 505 mL / NET: 235 mL         ---___---___---___---___---___---                       PHYSICAL EXAM:    GEN: A&O X 0 , NAD , comfortable  HEENT: NCAT, PERRL, MMM, no scleral icterus, hearing intact  NECK: Supple, No JVD  CVS: S1S2 , regular , No M/R/G appreciated  PULM: CTA B/L,  no W/R/R appreciated  ABD.: soft. non tender, non distended,  bowel sounds present  Extrem: intact pulses ,  edema noted to right leg   Derm: No rash or ecchymosis noted        ---___---___---___---___---___---     <<<  LAB AND IMAGING: >>>                          7.4    8.8   )-----------( 258      ( 09 Aug 2018 03:38 )             22.5               08-09    134<L>  |  103  |  23  ----------------------------<  165<H>  3.8   |  22  |  1.00    Ca    7.1<L>      09 Aug 2018 03:38  Phos  3.1     08-09  Mg     2.0     08-09               CARDIAC MARKERS ( 09 Aug 2018 03:38 )  x     / x     / 377 U/L / x     / x                ABG - ( 08 Aug 2018 11:05 )  pH, Arterial: 7.50  pH, Blood: x     /  pCO2: 31    /  pO2: 99    / HCO3: 24    / Base Excess: 1.2   /  SaO2: 99                      [All pertinent / recent available Imaging reports and other labs reviewed]     ---___---___---___---___---___---___ ---___---___---___---___---           <<<  A S S E S S M E N T   A N D   P L A N :  >>>          -GI/DVT Prophylaxis.    --------------------------------------------  Case discussed with   Education given on     >>______________________<<      Deniz Bolden .         phone   [phone]

## 2018-08-09 NOTE — PROGRESS NOTE ADULT - SUBJECTIVE AND OBJECTIVE BOX
Ortho Progress Note    S: Patient seen and examined. No acute events overnight. Pain well controlled with current regimen.       O:  Physical Exam:  Gen: Laying in bed, NAD, alert and oriented.   Resp: Unlabored breathing  Ext: EHL/FHL/TA/Sol intact          + SILT DP/SP/REED/Sa          +DP, extremity WWP  incisional vac in place  LUCY in place draining SS fluid,    Vital Signs Last 24 Hrs  T(C): 37.3 (09 Aug 2018 07:00), Max: 37.3 (09 Aug 2018 07:00)  T(F): 99.2 (09 Aug 2018 07:00), Max: 99.2 (09 Aug 2018 07:00)  HR: 113 (09 Aug 2018 08:00) (85 - 123)  BP: 173/80 (09 Aug 2018 08:00) (95/51 - 204/104)  BP(mean): 115 (09 Aug 2018 08:00) (70 - 142)  RR: 28 (09 Aug 2018 08:00) (19 - 35)  SpO2: 99% (09 Aug 2018 08:00) (97% - 100%)                          7.4    8.8   )-----------( 258      ( 09 Aug 2018 03:38 )             22.5                         8.1    12.6  )-----------( 275      ( 08 Aug 2018 03:23 )             24.5       08-09    134<L>  |  103  |  23  ----------------------------<  165<H>  3.8   |  22  |  1.00

## 2018-08-09 NOTE — PROGRESS NOTE ADULT - ASSESSMENT
68F s/p R hip PJI explant and placement of cement spacer w/ ORIF distal femur    Pain control  DVT ppx  FU blood cultures  Cont abx per ID  NWB LLE  FFWB RLE  PT/OT  Cont incisional vac, likely until 8/10  Monitor drain output      Ortho [id number]

## 2018-08-09 NOTE — OCCUPATIONAL THERAPY INITIAL EVALUATION ADULT - PERTINENT HX OF CURRENT PROBLEM, REHAB EVAL
68F pmhx of asthma, anxiety/depression, HLD, UC, pHTN presents with weakness.  She was recently hospitalized here for a right hip fracture s/p repair last month.  She is chrissie in by her family who say that on Monday she had the staples removed from her hip surgery.  She was 2 weeks ago developed a hematoma on the right hip after using Plavix with Lovenox for PPX after orhto surgery so this was stopped.

## 2018-08-09 NOTE — SWALLOW BEDSIDE ASSESSMENT ADULT - SLP GENERAL OBSERVATIONS
Pt found in bed, EDISON Villa at bedside. Pt appears restless and agitated, continuously moving in bed. + Nasal cannula, + KFT,. Essentially non-verbal except  occasional y/n responses however not reliable. Follows simple 1-step commands with models ~25%. Poor eye contact. Required constant repositioning with the assistance of EDISON Villa to maintain upright position tara exam. ? Pain, RN administered pain medication post exam. Pt found in bed, EDISON Villa at bedside. Pt appears restless and agitated, continuously moving in bed. + Nasal cannula, + KFT,. Essentially non-verbal except  occasional y/n responses however not reliable. Follows simple 1-step commands with models ~25%. Poor eye contact. Required constant repositioning with the assistance of EDISON Villa to maintain upright position tara exam. O2 saturation 99%, RR = 28-30. ? Pain, RN administered pain medication post exam.

## 2018-08-09 NOTE — SWALLOW BEDSIDE ASSESSMENT ADULT - SWALLOW EVAL: DIAGNOSIS
Pt presents with an oral and suspected pharyngeal dysphagia superimposed upon an altered mental status. Pt demonstrating reduced orientation to feeding requiring verbal/tactile cues to accept PO trials and an absent trigger of the swallow sequence unless provided with multiple verbal cues. Poor participation in feeding task requiring frequent re-direction. Pt presents with an oral and suspected pharyngeal dysphagia superimposed upon an altered mental status. Pt demonstrating reduced orientation to feeding task requiring verbal/tactile cues to accept PO trials and an absent trigger of the swallow sequence unless provided with multiple verbal cues. Poor participation in feeding task requiring frequent re-direction.

## 2018-08-09 NOTE — PROGRESS NOTE ADULT - SUBJECTIVE AND OBJECTIVE BOX
HISTORY  68 year old female with a past medical history of asthma on steroids, CVA (no residual deficits), pulmonary HTN, HLD, GERD, ulcerative colitis, fatty pancreas, impaired glucose tolerance, anxiety, and depression who presented on 7/26/2018 with altered mental status, rigors, and urinary frequency. Of note, patient was recently hospitalized for a right femoral neck fracture secondary to osteoporosis s/p hemiarthroplasty on 6/22/2018. She had been taking Plavix for her history of CVA and Lovenox for VTE prophylaxis but she developed a hematoma at her surgical site so the Lovenox was stopped. Since then, patient has been progressively more agitated, confused, and weak. Additionally, patient has been complaining of new onset low back pain and left hip pain. In the ED, patient was hemodynamically stable but noted to be hypoglycemic. She was also started on meropenem & vancomycin for concern of sepsis. CT scan revealed a 17 cm collection adjacent to the right hip prosthesis, an acute L2 compression fracture, and new left acetabular fracture. Patient was admitted to medicine but on 7/27/2018, patient was noted to be more altered, rigorous, tachypneic, febrile to 101 F, and hypotensive w/ SBP in the 60s so an RRT was called. She was more fluid and started on a norepinephrine gtt. Patient subsequently admitted to SICU for hemodynamic monitoring. Blood cultures positive for MRSA. Decision was made to take the patient to the OR on 7/27/2018 for right hip I&D and exchange of the femoral head. She returned to the SICU intubated on vasopressor support. She was extubated on 7/30/2018 but was reintubated on 8/2/2018. Additionally, patient was persistently on vasopressor support and bacteremic with MRSA. Patient was subsequently taken back to the OR on 8/4/2018 for further right hip I&D, explantation of right hemiarthroplasty, placement of antibiotic spacer, and right femur ORIF. Patient was subsequently weaned off vasopressor support on 8/5/2018 and successfully extubated on 8/6/2018.    24 HOUR EVENTS:  - Right IJ central venous catheter placed due to difficult IV access. PICC requested for long-term antibiotics in the setting of her MRSA bacteremia.  - Discontinued ceftaroline and started rifampin. Remains on daptomycin. CK low at .  - Surgical drain in right hip discontinued.  - Started on Ativan 0.25 mg PO q8hrs PRN for agitation. HISTORY  68 year old female with a past medical history of asthma on steroids, CVA (no residual deficits), pulmonary HTN, HLD, GERD, ulcerative colitis, fatty pancreas, impaired glucose tolerance, anxiety, and depression who presented on 7/26/2018 with altered mental status, rigors, and urinary frequency. Of note, patient was recently hospitalized for a right femoral neck fracture secondary to osteoporosis s/p hemiarthroplasty on 6/22/2018. She had been taking Plavix for her history of CVA and Lovenox for VTE prophylaxis but she developed a hematoma at her surgical site so the Lovenox was stopped. Since then, patient has been progressively more agitated, confused, and weak. Additionally, patient has been complaining of new onset low back pain and left hip pain. In the ED, patient was hemodynamically stable but noted to be hypoglycemic. She was also started on meropenem & vancomycin for concern of sepsis. CT scan revealed a 17 cm collection adjacent to the right hip prosthesis, an acute L2 compression fracture, and new left acetabular fracture. Patient was admitted to medicine but on 7/27/2018, patient was noted to be more altered, rigorous, tachypneic, febrile to 101 F, and hypotensive w/ SBP in the 60s so an RRT was called. She was more fluid and started on a norepinephrine gtt. Patient subsequently admitted to SICU for hemodynamic monitoring. Blood cultures positive for MRSA. Decision was made to take the patient to the OR on 7/27/2018 for right hip I&D and exchange of the femoral head. She returned to the SICU intubated on vasopressor support. She was extubated on 7/30/2018 but was reintubated on 8/2/2018. Additionally, patient was persistently on vasopressor support and bacteremic with MRSA. Patient was subsequently taken back to the OR on 8/4/2018 for further right hip I&D, explantation of right hemiarthroplasty, placement of antibiotic spacer, and right femur ORIF. Patient was subsequently weaned off vasopressor support on 8/5/2018 and successfully extubated on 8/6/2018.    24 HOUR EVENTS:  - Right IJ central venous catheter placed due to difficult IV access. PICC requested for long-term antibiotics in the setting of her MRSA bacteremia.  - Discontinued ceftaroline and started rifampin. Remains on daptomycin. CK low at .  - Surgical drain in right hip discontinued.  - Started on Ativan 0.25 mg PO q8hrs PRN for agitation.  - Right upper extremity venous Duplex negative for DVTs in the right subclavian, axillary, brachial, basilic, and cephalic veins. The right innominate and internal jugular were not evaluated due to the presence of her right IJ central venous catheter. HISTORY  68 year old female with a past medical history of asthma on steroids, CVA (no residual deficits), pulmonary HTN, HLD, GERD, ulcerative colitis, fatty pancreas, impaired glucose tolerance, anxiety, and depression who presented on 7/26/2018 with altered mental status, rigors, and urinary frequency. Of note, patient was recently hospitalized for a right femoral neck fracture secondary to osteoporosis s/p hemiarthroplasty on 6/22/2018. She had been taking Plavix for her history of CVA and Lovenox for VTE prophylaxis but she developed a hematoma at her surgical site so the Lovenox was stopped. Since then, patient has been progressively more agitated, confused, and weak. Additionally, patient has been complaining of new onset low back pain and left hip pain. In the ED, patient was hemodynamically stable but noted to be hypoglycemic. She was also started on meropenem & vancomycin for concern of sepsis. CT scan revealed a 17 cm collection adjacent to the right hip prosthesis, an acute L2 compression fracture, and new left acetabular fracture. Patient was admitted to medicine but on 7/27/2018, patient was noted to be more altered, rigorous, tachypneic, febrile to 101 F, and hypotensive w/ SBP in the 60s so an RRT was called. She was more fluid and started on a norepinephrine gtt. Patient subsequently admitted to SICU for hemodynamic monitoring. Blood cultures positive for MRSA. Decision was made to take the patient to the OR on 7/27/2018 for right hip I&D and exchange of the femoral head. She returned to the SICU intubated on vasopressor support. She was extubated on 7/30/2018 but was reintubated on 8/2/2018. Additionally, patient was persistently on vasopressor support and bacteremic with MRSA. Patient was subsequently taken back to the OR on 8/4/2018 for further right hip I&D, explantation of right hemiarthroplasty, placement of antibiotic spacer, and right femur ORIF. Patient was subsequently weaned off vasopressor support on 8/5/2018 and successfully extubated on 8/6/2018.    24 HOUR EVENTS:  - Right IJ central venous catheter placed due to difficult IV access. PICC requested for long-term antibiotics in the setting of her MRSA bacteremia.  - Discontinued ceftaroline and started rifampin. Remains on daptomycin. CK low at 377.  - Surgical drain in right hip discontinued.  - Started on Ativan 0.25 mg PO q8hrs PRN for agitation.  - Right upper extremity venous Duplex negative for DVTs in the right subclavian, axillary, brachial, basilic, and cephalic veins. The right innominate and internal jugular were not evaluated due to the presence of her right IJ central venous catheter.    SUBJECTIVE/ROS:  [ ] A ten-point review of systems was otherwise negative except as noted.  [x] Due to altered mental status/intubation, subjective information were not able to be obtained from the patient. History was obtained, to the extent possible, from review of the chart and collateral sources of information.    NEURO  Exam: awake, intermittently agitated, oriented to self only, follows commands, can reorient occasionally  Meds:  - acetaminophen   Tablet. 975 milliGRAM(s) Oral every 6 hours PRN Mild Pain (1 - 3)  - HYDROmorphone  Injectable 0.5 milliGRAM(s) IV Push every 4 hours PRN Severe Breakthrough Pain  - LORazepam     Tablet 0.25 milliGRAM(s) Oral every 8 hours PRN Agitation  - melatonin 3 milliGRAM(s) Oral <User Schedule>  - mirtazapine 45 milliGRAM(s) Oral <User Schedule>  - oxyCODONE    IR 5 milliGRAM(s) Oral every 4 hours PRN Severe Pain (7 - 10)  - QUEtiapine 50 milliGRAM(s) Oral <User Schedule>  - sertraline 50 milliGRAM(s) Oral daily  - lidocaine   Patch 1 Patch Transdermal every 24 hours  [x] Adequacy of sedation and pain control has been assessed and adjusted    RESPIRATORY  RR: 35 (08-09-18 @ 07:00) (19 - 39)  SpO2: 99% (08-09-18 @ 07:00) (68% - 100%)  Exam: decreased bibasilar breath sounds with some rhonchi in the upper lung fields  Mechanical Ventilation: no  [N/A] Extubation Readiness Assessed  Meds:  - ALBUTerol/ipratropium for Nebulization 3 milliLiter(s) Nebulizer every 6 hours  - buDESOnide   0.5 milliGRAM(s) Respule 0.5 milliGRAM(s) Inhalation every 12 hours  - predniSONE   Tablet 10 milliGRAM(s) Oral every 24 hours    CARDIOVASCULAR  HR: 110 (08-09-18 @ 07:00) (85 - 123)  BP: 160/62 (08-09-18 @ 07:00) (95/51 - 204/104)  BP(mean): 83 (08-09-18 @ 07:00) (70 - 142)  Exam: regular rate and rhythm, S1S2  Cardiac Rhythm: sinus  Perfusion    [x]Adequate    [ ]Inadequate  Mentation   [ ]Normal       [x]Reduced (dementia at baseline)  Extremities  [x]Warm         [ ]Cool  Volume Status [x]Hypervolemic [ ]Euvolemic [ ]Hypovolemic  Meds: none    GI/NUTRITION  Exam: soft, nontender, nondistended  Diet: NPO with Jevity at 20 mL/hr via NG tube  Meds: famotidine    Tablet 20 milliGRAM(s) Oral two times a day    GENITOURINARY  I&O's Detail    08-08 @ 07:01  -  08-09 @ 07:00  --------------------------------------------------------  IN:    dexmedetomidine Infusion: 33.6 mL    Enteral Tube Flush: 250 mL    IV PiggyBack: 250 mL    Pivot: 480 mL  Total IN: 1013.6 mL    OUT:    Accordian: 20 mL    Accordian: 120 mL    Indwelling Catheter - Urethral: 1075 mL  Total OUT: 1215 mL    Total NET: -201.4 mL      134<L>  |  103  |  23  ----------------------------<  165<H>  3.8   |  22  |  1.00    Ca    7.1<L>      09 Aug 2018 03:38  Phos  3.1  Mg     2.0    [x] Blackman catheter, indication: immobilization due to bilateral acetabular fractures  Meds: none    HEMATOLOGIC  Meds: enoxaparin Injectable 40 milliGRAM(s) SubCutaneous daily  [x] VTE Prophylaxis                        7.4    8.8   )-----------( 258      ( 09 Aug 2018 03:38 )             22.5     INFECTIOUS DISEASES  T(C): 36.9 (08-09-18 @ 03:00), Max: 37.9 (08-08-18 @ 08:00)  WBC Count: 8.8 K/uL (08-09 @ 03:38)    Recent Cultures:  Specimen Source: .Blood Blood-Peripheral, 08-07 @ 09:00  Results: No growth to date.; Gram Stain: --; Organism: --    Specimen Source: .Blood Blood-Peripheral, 08-06 @ 15:50  Results: No growth to date.; Gram Stain: --; Organism: --    Specimen Source: .Blood Blood-Peripheral, 08-05 @ 06:16  Results: Growth in aerobic and anaerobic bottles: Coag Negative Staphylococcus (Single set isolate, possible contaminant)  Gram Stain: Growth in aerobic and anaerobic bottles: Gram Positive Cocci in Clusters    Specimen Source: .Tissue Other, sxiorium right hip, 08-04 @ 07:29  Results: No growth  Gram Stain: Moderate polymorphonuclear leukocytes seen per low power field; Few Gram Positive Cocci seen per oil power field; Organism: --    Specimen Source: .Blood Blood, 08-04 @ 07:00;  Results: No growth to date.; Gram Stain: --; Organism: --    Specimen Source: .Blood Blood-Peripheral, 08-01 @ 08:47  Results: Growth in anaerobic bottle: Methicillin resistant Staphylococcus aureus  Gram Stain: Growth in anaerobic bottle: Gram Positive Cocci in Clusters; Organism: --    Meds:  - DAPTOmycin IVPB 500 milliGRAM(s) IV Intermittent every 24 hours  - rifampin IVPB 600 milliGRAM(s) IV Intermittent every 24 hours    ENDOCRINE  Capillary Blood Glucose:  - 137 mg/dL (09 Aug 2018 06:25)  - 120 mg/dL (08 Aug 2018 17:57)  - 129 mg/dL (08 Aug 2018 14:02)  - 128 mg/dL (08 Aug 2018 09:13)  Meds: insulin lispro (HumaLOG) corrective regimen sliding scale   SubCutaneous every 4 hours    ACCESS DEVICES:  [x] Peripheral IV  [x] Central Venous Line	[x] R	[ ] L	[x] IJ	[ ] Fem	[ ] SC	Placed: 8/8/2018  [ ] Arterial Line		[ ] R	[ ] L	[ ] Fem	[ ] Rad	[ ] Ax	Placed:   [ ] PICC:					[ ] Mediport  [x] Urinary Catheter, Date Placed: 7/26/2018  [x] Necessity of urinary, arterial, and venous catheters discussed    OTHER MEDICATIONS:  - chlorhexidine 4% Liquid 1 Application(s) Topical <User Schedule>  - FIRST- Mouthwash  BLM 5 milliLiter(s) Swish and Spit daily    CODE STATUS: Full code.    IMAGING:

## 2018-08-09 NOTE — PROGRESS NOTE ADULT - ATTENDING COMMENTS
I examined this patient on AM SICU rounds with the multidisciplinary team.  All relevant studies reviewed.  Agree with above.   Last 24 hours the patient remained extubated without significant respiratory distress.  She did and does have periods of restlessness and anxiety - these may well be related to her underlying Alzheimer's dementia.  Key issues:    Altered mental status:  agitation.  In the setting of known Alzheimer's dementia with behavioral disturbance.  Back on all medications as of last night except for Klonopin.  Will add today.  Not hypoxic nor hypoglycemic.  Acute respiratory failure s/p surgery:  this appears to be largely resolved at this time.  While patient does remain mildly tachypneic, gas exchange is satisfactory and this is likely either secondary to acute blood loss anemia (transfusing today) and/or anxiety.  Acute blood loss anemia - Hct @ 22.6 will transfuse today.    Overall improved.  Likely a candidate for transfer to floor today or tomorrow.  We need to re-engage psychiatry given history.

## 2018-08-09 NOTE — OCCUPATIONAL THERAPY INITIAL EVALUATION ADULT - ADDITIONAL COMMENTS
She kept trying to get out of bed and walk to the bathroom and she usually walks with a walker with some assistance but last night she actuely became weak and not able to walk with walker and 2 person assist.  Becca  says this presentation was similar to when she broke her right hip last month (has osteoperosis) and was concerned again but after ED visit said she did not break her hip.  She has been taking her Oxycodone since the surgery Q4hrs w/o pain relief.  Last night she developed rigors, did not take temperature, urinary frequency, increased confusion and agitation along with back pain and LE weakness so she was brought days. Doppler : The right innominate, internal jugular and medial segment of   the right subclavian vein were not imaged due to overlying dressing. Xray chest (-) s/p Bone surgery  07/27/2018  exchange of femoral head component and Irrigation and debridement of femur or hip  07/27/2018, Placement of antibiotic spacer ORIF R femur 8/4

## 2018-08-10 DIAGNOSIS — R50.9 FEVER, UNSPECIFIED: ICD-10-CM

## 2018-08-10 LAB
ANION GAP SERPL CALC-SCNC: 13 MMOL/L — SIGNIFICANT CHANGE UP (ref 5–17)
BASOPHILS # BLD AUTO: 0.1 K/UL — SIGNIFICANT CHANGE UP (ref 0–0.2)
BASOPHILS NFR BLD AUTO: 0.4 % — SIGNIFICANT CHANGE UP (ref 0–2)
BUN SERPL-MCNC: 28 MG/DL — HIGH (ref 7–23)
CA-I BLD-SCNC: 1.06 MMOL/L — LOW (ref 1.12–1.3)
CALCIUM SERPL-MCNC: 7.3 MG/DL — LOW (ref 8.4–10.5)
CHLORIDE SERPL-SCNC: 104 MMOL/L — SIGNIFICANT CHANGE UP (ref 96–108)
CK SERPL-CCNC: 455 U/L — HIGH (ref 25–170)
CO2 SERPL-SCNC: 23 MMOL/L — SIGNIFICANT CHANGE UP (ref 22–31)
CREAT SERPL-MCNC: 1.12 MG/DL — SIGNIFICANT CHANGE UP (ref 0.5–1.3)
CULTURE RESULTS: SIGNIFICANT CHANGE UP
CULTURE RESULTS: SIGNIFICANT CHANGE UP
EOSINOPHIL # BLD AUTO: 0.1 K/UL — SIGNIFICANT CHANGE UP (ref 0–0.5)
EOSINOPHIL NFR BLD AUTO: 0.8 % — SIGNIFICANT CHANGE UP (ref 0–6)
GAS PNL BLDA: SIGNIFICANT CHANGE UP
GAS PNL BLDA: SIGNIFICANT CHANGE UP
GLUCOSE SERPL-MCNC: 148 MG/DL — HIGH (ref 70–99)
HCT VFR BLD CALC: 26.9 % — LOW (ref 34.5–45)
HCT VFR BLD CALC: 28.7 % — LOW (ref 34.5–45)
HGB BLD-MCNC: 9.1 G/DL — LOW (ref 11.5–15.5)
HGB BLD-MCNC: 9.2 G/DL — LOW (ref 11.5–15.5)
LYMPHOCYTES # BLD AUTO: 1 K/UL — SIGNIFICANT CHANGE UP (ref 1–3.3)
LYMPHOCYTES # BLD AUTO: 7 % — LOW (ref 13–44)
MAGNESIUM SERPL-MCNC: 1.5 MG/DL — LOW (ref 1.6–2.6)
MCHC RBC-ENTMCNC: 28.8 PG — SIGNIFICANT CHANGE UP (ref 27–34)
MCHC RBC-ENTMCNC: 30.3 PG — SIGNIFICANT CHANGE UP (ref 27–34)
MCHC RBC-ENTMCNC: 32 GM/DL — SIGNIFICANT CHANGE UP (ref 32–36)
MCHC RBC-ENTMCNC: 33.8 GM/DL — SIGNIFICANT CHANGE UP (ref 32–36)
MCV RBC AUTO: 89.5 FL — SIGNIFICANT CHANGE UP (ref 80–100)
MCV RBC AUTO: 90 FL — SIGNIFICANT CHANGE UP (ref 80–100)
MONOCYTES # BLD AUTO: 1 K/UL — HIGH (ref 0–0.9)
MONOCYTES NFR BLD AUTO: 7.3 % — SIGNIFICANT CHANGE UP (ref 2–14)
NEUTROPHILS # BLD AUTO: 12.1 K/UL — HIGH (ref 1.8–7.4)
NEUTROPHILS NFR BLD AUTO: 84.5 % — HIGH (ref 43–77)
PHOSPHATE SERPL-MCNC: 2.7 MG/DL — SIGNIFICANT CHANGE UP (ref 2.5–4.5)
PLATELET # BLD AUTO: 333 K/UL — SIGNIFICANT CHANGE UP (ref 150–400)
PLATELET # BLD AUTO: 343 K/UL — SIGNIFICANT CHANGE UP (ref 150–400)
POTASSIUM SERPL-MCNC: 4.2 MMOL/L — SIGNIFICANT CHANGE UP (ref 3.5–5.3)
POTASSIUM SERPL-SCNC: 4.2 MMOL/L — SIGNIFICANT CHANGE UP (ref 3.5–5.3)
PROCALCITONIN SERPL-MCNC: 3 NG/ML — HIGH (ref 0.02–0.1)
RBC # BLD: 3 M/UL — LOW (ref 3.8–5.2)
RBC # BLD: 3.18 M/UL — LOW (ref 3.8–5.2)
RBC # FLD: 14.7 % — HIGH (ref 10.3–14.5)
RBC # FLD: 15.3 % — HIGH (ref 10.3–14.5)
SODIUM SERPL-SCNC: 140 MMOL/L — SIGNIFICANT CHANGE UP (ref 135–145)
SPECIMEN SOURCE: SIGNIFICANT CHANGE UP
SPECIMEN SOURCE: SIGNIFICANT CHANGE UP
WBC # BLD: 14.4 K/UL — HIGH (ref 3.8–10.5)
WBC # BLD: 14.4 K/UL — HIGH (ref 3.8–10.5)
WBC # FLD AUTO: 14.4 K/UL — HIGH (ref 3.8–10.5)
WBC # FLD AUTO: 14.4 K/UL — HIGH (ref 3.8–10.5)

## 2018-08-10 PROCEDURE — 71045 X-RAY EXAM CHEST 1 VIEW: CPT | Mod: 26

## 2018-08-10 PROCEDURE — 72170 X-RAY EXAM OF PELVIS: CPT | Mod: 26

## 2018-08-10 PROCEDURE — 99221 1ST HOSP IP/OBS SF/LOW 40: CPT

## 2018-08-10 PROCEDURE — 73551 X-RAY EXAM OF FEMUR 1: CPT | Mod: 26,RT

## 2018-08-10 PROCEDURE — 73610 X-RAY EXAM OF ANKLE: CPT | Mod: 26,LT

## 2018-08-10 RX ORDER — CHLORHEXIDINE GLUCONATE 213 G/1000ML
15 SOLUTION TOPICAL
Qty: 0 | Refills: 0 | Status: DISCONTINUED | OUTPATIENT
Start: 2018-08-10 | End: 2018-08-15

## 2018-08-10 RX ORDER — ACETAMINOPHEN 500 MG
650 TABLET ORAL ONCE
Qty: 0 | Refills: 0 | Status: DISCONTINUED | OUTPATIENT
Start: 2018-08-10 | End: 2018-08-10

## 2018-08-10 RX ORDER — SERTRALINE 25 MG/1
50 TABLET, FILM COATED ORAL DAILY
Qty: 0 | Refills: 0 | Status: DISCONTINUED | OUTPATIENT
Start: 2018-08-10 | End: 2018-08-27

## 2018-08-10 RX ORDER — MIRTAZAPINE 45 MG/1
45 TABLET, ORALLY DISINTEGRATING ORAL
Qty: 0 | Refills: 0 | Status: DISCONTINUED | OUTPATIENT
Start: 2018-08-10 | End: 2018-08-27

## 2018-08-10 RX ORDER — OXYCODONE HYDROCHLORIDE 5 MG/1
5 TABLET ORAL EVERY 4 HOURS
Qty: 0 | Refills: 0 | Status: DISCONTINUED | OUTPATIENT
Start: 2018-08-10 | End: 2018-08-15

## 2018-08-10 RX ORDER — VANCOMYCIN HCL 1 G
750 VIAL (EA) INTRAVENOUS EVERY 12 HOURS
Qty: 0 | Refills: 0 | Status: DISCONTINUED | OUTPATIENT
Start: 2018-08-10 | End: 2018-08-14

## 2018-08-10 RX ORDER — MEROPENEM 1 G/30ML
1000 INJECTION INTRAVENOUS EVERY 8 HOURS
Qty: 0 | Refills: 0 | Status: DISCONTINUED | OUTPATIENT
Start: 2018-08-10 | End: 2018-08-16

## 2018-08-10 RX ORDER — CALCIUM GLUCONATE 100 MG/ML
2 VIAL (ML) INTRAVENOUS ONCE
Qty: 0 | Refills: 0 | Status: COMPLETED | OUTPATIENT
Start: 2018-08-10 | End: 2018-08-10

## 2018-08-10 RX ORDER — PROPOFOL 10 MG/ML
10 INJECTION, EMULSION INTRAVENOUS
Qty: 500 | Refills: 0 | Status: DISCONTINUED | OUTPATIENT
Start: 2018-08-10 | End: 2018-08-14

## 2018-08-10 RX ORDER — FAMOTIDINE 10 MG/ML
20 INJECTION INTRAVENOUS DAILY
Qty: 0 | Refills: 0 | Status: DISCONTINUED | OUTPATIENT
Start: 2018-08-10 | End: 2018-08-25

## 2018-08-10 RX ORDER — LANOLIN ALCOHOL/MO/W.PET/CERES
3 CREAM (GRAM) TOPICAL
Qty: 0 | Refills: 0 | Status: DISCONTINUED | OUTPATIENT
Start: 2018-08-10 | End: 2018-08-27

## 2018-08-10 RX ORDER — ACETAMINOPHEN 500 MG
650 TABLET ORAL ONCE
Qty: 0 | Refills: 0 | Status: COMPLETED | OUTPATIENT
Start: 2018-08-10 | End: 2018-08-10

## 2018-08-10 RX ORDER — QUETIAPINE FUMARATE 200 MG/1
50 TABLET, FILM COATED ORAL
Qty: 0 | Refills: 0 | Status: DISCONTINUED | OUTPATIENT
Start: 2018-08-10 | End: 2018-08-27

## 2018-08-10 RX ORDER — MAGNESIUM SULFATE 500 MG/ML
2 VIAL (ML) INJECTION
Qty: 0 | Refills: 0 | Status: COMPLETED | OUTPATIENT
Start: 2018-08-10 | End: 2018-08-10

## 2018-08-10 RX ORDER — CLONAZEPAM 1 MG
2 TABLET ORAL AT BEDTIME
Qty: 0 | Refills: 0 | Status: DISCONTINUED | OUTPATIENT
Start: 2018-08-10 | End: 2018-08-15

## 2018-08-10 RX ADMIN — MEROPENEM 100 MILLIGRAM(S): 1 INJECTION INTRAVENOUS at 21:10

## 2018-08-10 RX ADMIN — Medication 25 MILLIGRAM(S): at 17:03

## 2018-08-10 RX ADMIN — Medication 200 GRAM(S): at 06:21

## 2018-08-10 RX ADMIN — Medication 2: at 03:07

## 2018-08-10 RX ADMIN — LIDOCAINE 1 PATCH: 4 CREAM TOPICAL at 23:23

## 2018-08-10 RX ADMIN — FAMOTIDINE 20 MILLIGRAM(S): 10 INJECTION INTRAVENOUS at 11:23

## 2018-08-10 RX ADMIN — Medication 650 MILLIGRAM(S): at 03:52

## 2018-08-10 RX ADMIN — SERTRALINE 50 MILLIGRAM(S): 25 TABLET, FILM COATED ORAL at 11:22

## 2018-08-10 RX ADMIN — Medication 2 MILLIGRAM(S): at 21:10

## 2018-08-10 RX ADMIN — CHLORHEXIDINE GLUCONATE 15 MILLILITER(S): 213 SOLUTION TOPICAL at 21:10

## 2018-08-10 RX ADMIN — MIRTAZAPINE 45 MILLIGRAM(S): 45 TABLET, ORALLY DISINTEGRATING ORAL at 21:10

## 2018-08-10 RX ADMIN — Medication 2: at 05:20

## 2018-08-10 RX ADMIN — Medication 3 MILLILITER(S): at 12:07

## 2018-08-10 RX ADMIN — LIDOCAINE 1 PATCH: 4 CREAM TOPICAL at 11:21

## 2018-08-10 RX ADMIN — Medication 50 GRAM(S): at 11:21

## 2018-08-10 RX ADMIN — Medication 25 MILLIGRAM(S): at 11:22

## 2018-08-10 RX ADMIN — Medication 50 GRAM(S): at 06:22

## 2018-08-10 RX ADMIN — Medication 3 MILLIGRAM(S): at 21:10

## 2018-08-10 RX ADMIN — Medication 250 MILLIGRAM(S): at 17:03

## 2018-08-10 RX ADMIN — Medication 0.5 MILLIGRAM(S): at 05:11

## 2018-08-10 RX ADMIN — Medication 25 MILLIGRAM(S): at 05:20

## 2018-08-10 RX ADMIN — Medication 0.5 MILLIGRAM(S): at 18:14

## 2018-08-10 RX ADMIN — Medication 10 MILLIGRAM(S): at 03:08

## 2018-08-10 RX ADMIN — QUETIAPINE FUMARATE 50 MILLIGRAM(S): 200 TABLET, FILM COATED ORAL at 21:10

## 2018-08-10 RX ADMIN — PROPOFOL 3.02 MICROGRAM(S)/KG/MIN: 10 INJECTION, EMULSION INTRAVENOUS at 21:11

## 2018-08-10 RX ADMIN — CHLORHEXIDINE GLUCONATE 1 APPLICATION(S): 213 SOLUTION TOPICAL at 05:20

## 2018-08-10 RX ADMIN — Medication 10 MILLIGRAM(S): at 11:21

## 2018-08-10 RX ADMIN — Medication 3 MILLILITER(S): at 18:14

## 2018-08-10 RX ADMIN — Medication 3 MILLILITER(S): at 05:11

## 2018-08-10 RX ADMIN — MEROPENEM 100 MILLIGRAM(S): 1 INJECTION INTRAVENOUS at 13:38

## 2018-08-10 RX ADMIN — ENOXAPARIN SODIUM 40 MILLIGRAM(S): 100 INJECTION SUBCUTANEOUS at 11:23

## 2018-08-10 RX ADMIN — Medication 62.5 MILLIMOLE(S): at 06:21

## 2018-08-10 RX ADMIN — Medication 2: at 11:20

## 2018-08-10 NOTE — PROGRESS NOTE ADULT - ATTENDING COMMENTS
I saw and evaluated patient and agree with above note.  extremely weak and deconditioned  the respiratory rate was measured at 38 this morning during rounds and labored  placed on non invasive positive pressure ventilation for acute respiratory failure   marginal effect patient is still in distress and plan on intubation  fever over last 24 hours and cultures persistent gram positive cocci with infectious disease following.  TRANG from [facility] is stabilized.  40 minutes of critical care management applied.

## 2018-08-10 NOTE — PROGRESS NOTE ADULT - SUBJECTIVE AND OBJECTIVE BOX
PULMONARY PROGRESS NOTE    MYRIAM HEATH  MRN-[id number]    Patient is a 68y old  Female who presents with a chief complaint of weakness (26 Jul 2018 16:54)      HPI:  -on bipap, awake, nods yes/no to questions    ROS:   -unable to obtain secondary to bipap    ACTIVE MEDICATION LIST:  MEDICATIONS  (STANDING):  ALBUTerol/ipratropium for Nebulization 3 milliLiter(s) Nebulizer every 6 hours  buDESOnide   0.5 milliGRAM(s) Respule 0.5 milliGRAM(s) Inhalation every 12 hours  chlorhexidine 4% Liquid 1 Application(s) Topical <User Schedule>  clonazePAM Tablet 2 milliGRAM(s) Oral at bedtime  DAPTOmycin IVPB 500 milliGRAM(s) IV Intermittent every 24 hours  diphenhydrAMINE   Injectable 25 milliGRAM(s) IV Push every 6 hours  enoxaparin Injectable 40 milliGRAM(s) SubCutaneous daily  famotidine    Tablet 20 milliGRAM(s) Oral daily  FIRST- Mouthwash  BLM 5 milliLiter(s) Swish and Spit daily  insulin lispro (HumaLOG) corrective regimen sliding scale   SubCutaneous every 4 hours  lidocaine   Patch 1 Patch Transdermal every 24 hours  magnesium sulfate  IVPB 2 Gram(s) IV Intermittent every 1 hour  melatonin 3 milliGRAM(s) Oral <User Schedule>  mirtazapine 45 milliGRAM(s) Oral <User Schedule>  predniSONE   Tablet 10 milliGRAM(s) Oral every 24 hours  QUEtiapine 50 milliGRAM(s) Oral <User Schedule>  rifampin IVPB 600 milliGRAM(s) IV Intermittent every 24 hours  rifampin IVPB      sertraline 50 milliGRAM(s) Oral daily    MEDICATIONS  (PRN):  HYDROmorphone  Injectable 0.5 milliGRAM(s) IV Push every 4 hours PRN Severe Breakthrough Pain  oxyCODONE    Solution 5 milliGRAM(s) Enteral Tube every 4 hours PRN Severe Pain (7 - 10)      EXAM:  Vital Signs Last 24 Hrs  T(C): 37.9 (10 Aug 2018 08:00), Max: 39.6 (10 Aug 2018 03:00)  T(F): 100.3 (10 Aug 2018 08:00), Max: 103.3 (10 Aug 2018 03:00)  HR: 123 (10 Aug 2018 10:00) (110 - 129)  BP: 170/86 (10 Aug 2018 10:00) (137/63 - 175/81)  BP(mean): 122 (10 Aug 2018 10:00) (88 - 130)  RR: 45 (10 Aug 2018 10:00) (15 - 50)  SpO2: 100% (10 Aug 2018 10:00) (95% - 100%)    GENERAL: The patient is awake and alert in no apparent distress.     LUNGS: Clear to auscultation without wheezing, rales or rhonchi; respirations unlabored      LABS/IMAGING: reviewed                        9.2    14.4  )-----------( 343      ( 10 Aug 2018 03:29 )             28.7   08-10    140  |  104  |  28<H>  ----------------------------<  148<H>  4.2   |  23  |  1.12    Ca    7.3<L>      10 Aug 2018 03:29  Phos  2.7     08-10  Mg     1.5     08-10    < from: Xray Chest 1 View- PORTABLE-Urgent (08.09.18 @ 09:02) >    IMPRESSION:     The enteric tube is in place with its tip in the stomach.    Minimal improvement of the hazy opacities in the right mid to upper lung.    < end of copied text >          PROBLEM LIST:  68y Female with HEALTH ISSUES - PROBLEM Dx:  Fever, unspecified fever cause: Fever, unspecified fever cause  Anxiety: Anxiety  IGT (impaired glucose tolerance): IGT (impaired glucose tolerance)  Metabolic acidosis, NAG, bicarbonate losses: Metabolic acidosis, NAG, bicarbonate losses  Hypokalemia: Hypokalemia  Sepsis due to methicillin susceptible Staphylococcus aureus: Sepsis due to methicillin susceptible Staphylococcus aureus  Hyponatremia: Hyponatremia  Pelvic fracture: Pelvic fracture  Asthma: Asthma  Electrolyte abnormality: Electrolyte abnormality  Troponin level elevated: Troponin level elevated  Hypoglycemia: Hypoglycemia  Metabolic acidosis with increased anion gap and accumulation of organic acids: Metabolic acidosis with increased anion gap and accumulation of organic acids  Urinary frequency: Urinary frequency  Toxic metabolic encephalopathy: Toxic metabolic encephalopathy  Acute back pain with sciatica, left: Acute back pain with sciatica, left  Severe sepsis: Severe sepsis  Weakness of left lower extremity: Weakness of left lower extremity      RECS:  -appreciate SICU and surgical care  -pulmicort and duoneb for asthma  -ID follow up        Luciana Mancini MD   [phone]

## 2018-08-10 NOTE — PROGRESS NOTE ADULT - SUBJECTIVE AND OBJECTIVE BOX
---___---___---___---___---___---___ ---___---___---___---___---___---___---___---___---___---                    <<<  M E D I C A L   A T T E N D I N G    F O L L O W    U P   N O T E  >>>    - Patient seen and examined by me approximately thirty minutes ago.  - In summary, patient is a 68y year old Female who origianlly presented with mrsa sepsis and bacteremia , from implanted hardware now with new onset fever        ---___---___---___---___---___---      <<<  MEDICATIONS:  >>>    MEDICATIONS  (STANDING):  ALBUTerol/ipratropium for Nebulization 3 milliLiter(s) Nebulizer every 6 hours  buDESOnide   0.5 milliGRAM(s) Respule 0.5 milliGRAM(s) Inhalation every 12 hours  chlorhexidine 4% Liquid 1 Application(s) Topical <User Schedule>  clonazePAM Tablet 2 milliGRAM(s) Oral at bedtime  DAPTOmycin IVPB 500 milliGRAM(s) IV Intermittent every 24 hours  dexmedetomidine Infusion 0.5 MICROgram(s)/kG/Hr (6.3 mL/Hr) IV Continuous <Continuous>  diphenhydrAMINE   Injectable 25 milliGRAM(s) IV Push every 6 hours  enoxaparin Injectable 40 milliGRAM(s) SubCutaneous daily  famotidine    Tablet 20 milliGRAM(s) Oral daily  FIRST- Mouthwash  BLM 5 milliLiter(s) Swish and Spit daily  insulin lispro (HumaLOG) corrective regimen sliding scale   SubCutaneous every 4 hours  lidocaine   Patch 1 Patch Transdermal every 24 hours  magnesium sulfate  IVPB 2 Gram(s) IV Intermittent every 1 hour  melatonin 3 milliGRAM(s) Oral <User Schedule>  mirtazapine 45 milliGRAM(s) Oral <User Schedule>  predniSONE   Tablet 10 milliGRAM(s) Oral every 24 hours  QUEtiapine 50 milliGRAM(s) Oral <User Schedule>  rifampin IVPB 600 milliGRAM(s) IV Intermittent every 24 hours  rifampin IVPB      sertraline 50 milliGRAM(s) Oral daily      MEDICATIONS  (PRN):  HYDROmorphone  Injectable 0.5 milliGRAM(s) IV Push every 4 hours PRN Severe Breakthrough Pain  oxyCODONE    Solution 5 milliGRAM(s) Enteral Tube every 4 hours PRN Severe Pain (7 - 10)       ---___---___---___---___---___---     <<<REVIEW OF SYSTEM: >>>    unable to obtain    ---___---___---___---___---___---          <<<  VITAL SIGNS: >>>    T(F): 101.2 (08-10-18 @ 05:00), Max: 103.3 (08-10-18 @ 03:00)  HR: 123 (08-10-18 @ 07:00) (101 - 129)  BP: 155/80 (08-10-18 @ 07:00) (137/63 - 175/81)  RR: 45 (08-10-18 @ 07:00) (15 - 50)  SpO2: 95% (08-10-18 @ 07:00) (95% - 100%)  Wt(kg): --  CAPILLARY BLOOD GLUCOSE      POCT Blood Glucose.: 155 mg/dL (10 Aug 2018 05:13)    I&O's Summary    09 Aug 2018 07:01  -  10 Aug 2018 07:00  --------------------------------------------------------  IN: 1607.9 mL / OUT: 2845 mL / NET: -1237.1 mL         ---___---___---___---___---___---                       PHYSICAL EXAM:    GEN: A&O X 1 , NAD , agitated  HEENT: NCAT, PERRL, MMM, no scleral icterus, hearing intact  NECK: Supple, No JVD  CVS: S1S2 , regular , No M/R/G appreciated  PULM: CTA B/L,  no W/R/R appreciated  ABD.: soft. non tender, non distended,  bowel sounds present  Extrem: intact pulses , no edema noted  Derm: No rash or ecchymosis noted  PSYCH: normal mood, no depression,  anxious     ---___---___---___---___---___---     <<<  LAB AND IMAGING: >>>                          9.2    14.4  )-----------( 343      ( 10 Aug 2018 03:29 )             28.7               08-10    140  |  104  |  28<H>  ----------------------------<  148<H>  4.2   |  23  |  1.12    Ca    7.3<L>      10 Aug 2018 03:29  Phos  2.7     08-10  Mg     1.5     08-10             Urinalysis Basic - ( 09 Aug 2018 16:40 )    Color: Yellow / Appearance: Clear / S.007 / pH: x  Gluc: x / Ketone: Negative  / Bili: Negative / Urobili: Negative   Blood: x / Protein: Negative / Nitrite: Negative   Leuk Esterase: Large / RBC: 0-2 /HPF / WBC >50 /HPF   Sq Epi: x / Non Sq Epi: x / Bacteria: x      CARDIAC MARKERS ( 10 Aug 2018 03:29 )  x     / x     / 455 U/L / x     / x      CARDIAC MARKERS ( 09 Aug 2018 03:38 )  x     / x     / 377 U/L / x     / x                ABG - ( 08 Aug 2018 11:05 )  pH, Arterial: 7.50  pH, Blood: x     /  pCO2: 31    /  pO2: 99    / HCO3: 24    / Base Excess: 1.2   /  SaO2: 99                      [All pertinent / recent available Imaging reports and other labs reviewed]     ---___---___---___---___---___---___ ---___---___---___---___---           <<<  A S S E S S M E N T   A N D   P L A N :  >>>          -GI/DVT Prophylaxis.    --------------------------------------------       >>______________________<<      Deniz Bolden .         phone   [phone]

## 2018-08-10 NOTE — PROGRESS NOTE ADULT - SUBJECTIVE AND OBJECTIVE BOX
HISTORY  68 year old female with a past medical history of asthma on steroids, CVA (no residual deficits), pulmonary HTN, HLD, GERD, ulcerative colitis, fatty pancreas, impaired glucose tolerance, anxiety, and depression who presented on 7/26/2018 with altered mental status, rigors, and urinary frequency. Of note, patient was recently hospitalized for a right femoral neck fracture secondary to osteoporosis s/p hemiarthroplasty on 6/22/2018. She had been taking Plavix for her history of CVA and Lovenox for VTE prophylaxis but she developed a hematoma at her surgical site so the Lovenox was stopped. Since then, patient has been progressively more agitated, confused, and weak. Additionally, patient has been complaining of new onset low back pain and left hip pain. In the ED, patient was hemodynamically stable but noted to be hypoglycemic. She was also started on meropenem & vancomycin for concern of sepsis. CT scan revealed a 17 cm collection adjacent to the right hip prosthesis, an acute L2 compression fracture, and new left acetabular fracture. Patient was admitted to medicine but on 7/27/2018, patient was noted to be more altered, rigorous, tachypneic, febrile to 101 F, and hypotensive w/ SBP in the 60s so an RRT was called. She was more fluid and started on a norepinephrine gtt. Patient subsequently admitted to SICU for hemodynamic monitoring. Blood cultures positive for MRSA. Decision was made to take the patient to the OR on 7/27/2018 for right hip I&D and exchange of the femoral head. She returned to the SICU intubated on vasopressor support. She was extubated on 7/30/2018 but was reintubated on 8/2/2018. Additionally, patient was persistently on vasopressor support and bacteremic with MRSA. Patient was subsequently taken back to the OR on 8/4/2018 for further right hip I&D, explantation of right hemiarthroplasty, placement of antibiotic spacer, and right femur ORIF. Patient was subsequently weaned off vasopressor support on 8/5/2018 and successfully extubated on 8/6/2018.    24 HOUR EVENTS: HISTORY  68 year old female with a past medical history of asthma on steroids, CVA (no residual deficits), pulmonary HTN, HLD, GERD, ulcerative colitis, fatty pancreas, impaired glucose tolerance, anxiety, and depression who presented on 7/26/2018 with altered mental status, rigors, and urinary frequency. Of note, patient was recently hospitalized for a right femoral neck fracture secondary to osteoporosis s/p hemiarthroplasty on 6/22/2018. She had been taking Plavix for her history of CVA and Lovenox for VTE prophylaxis but she developed a hematoma at her surgical site so the Lovenox was stopped. Since then, patient has been progressively more agitated, confused, and weak. Additionally, patient has been complaining of new onset low back pain and left hip pain. In the ED, patient was hemodynamically stable but noted to be hypoglycemic. She was also started on meropenem & vancomycin for concern of sepsis. CT scan revealed a 17 cm collection adjacent to the right hip prosthesis, an acute L2 compression fracture, and new left acetabular fracture. Patient was admitted to medicine but on 7/27/2018, patient was noted to be more altered, rigorous, tachypneic, febrile to 101 F, and hypotensive w/ SBP in the 60s so an RRT was called. She was more fluid and started on a norepinephrine gtt. Patient subsequently admitted to SICU for hemodynamic monitoring. Blood cultures positive for MRSA. Decision was made to take the patient to the OR on 7/27/2018 for right hip I&D and exchange of the femoral head. She returned to the SICU intubated on vasopressor support. She was extubated on 7/30/2018 but was reintubated on 8/2/2018. Additionally, patient was persistently on vasopressor support and bacteremic with MRSA. Patient was subsequently taken back to the OR on 8/4/2018 for further right hip I&D, explantation of right hemiarthroplasty, placement of antibiotic spacer, and right femur ORIF. Patient was subsequently weaned off vasopressor support on 8/5/2018 and successfully extubated on 8/6/2018.    24 HOUR EVENTS:  - Remains intermittently agitated and delirious. Restarted on home Klonopin. Ativan discontinued. Required Precedex gtt overnight.  - Advanced tube feeds to goal of 45 mL/hr with Jevity.  - Diuresis with Lasix 20 mg IV x1 dose.  - 1 unit of PRBCs given for HCT of 22.5 with response to 29.1.  - Febrile to 38.9 C during the day and was pan-cultured. UA grossly positive but HISTORY  68 year old female with a past medical history of asthma on steroids, CVA (no residual deficits), pulmonary HTN, HLD, GERD, ulcerative colitis, fatty pancreas, impaired glucose tolerance, anxiety, and depression who presented on 7/26/2018 with altered mental status, rigors, and urinary frequency. Of note, patient was recently hospitalized for a right femoral neck fracture secondary to osteoporosis s/p hemiarthroplasty on 6/22/2018. She had been taking Plavix for her history of CVA and Lovenox for VTE prophylaxis but she developed a hematoma at her surgical site so the Lovenox was stopped. Since then, patient has been progressively more agitated, confused, and weak. Additionally, patient has been complaining of new onset low back pain and left hip pain. In the ED, patient was hemodynamically stable but noted to be hypoglycemic. She was also started on meropenem & vancomycin for concern of sepsis. CT scan revealed a 17 cm collection adjacent to the right hip prosthesis, an acute L2 compression fracture, and new left acetabular fracture. Patient was admitted to medicine but on 7/27/2018, patient was noted to be more altered, rigorous, tachypneic, febrile to 101 F, and hypotensive w/ SBP in the 60s so an RRT was called. She was more fluid and started on a norepinephrine gtt. Patient subsequently admitted to SICU for hemodynamic monitoring. Blood cultures positive for MRSA. Decision was made to take the patient to the OR on 7/27/2018 for right hip I&D and exchange of the femoral head. She returned to the SICU intubated on vasopressor support. She was extubated on 7/30/2018 but was reintubated on 8/2/2018. Additionally, patient was persistently on vasopressor support and bacteremic with MRSA. Patient was subsequently taken back to the OR on 8/4/2018 for further right hip I&D, explantation of right hemiarthroplasty, placement of antibiotic spacer, and right femur ORIF. Patient was subsequently weaned off vasopressor support on 8/5/2018 and successfully extubated on 8/6/2018.    24 HOUR EVENTS:  - Remains intermittently agitated and delirious. Restarted on home Klonopin. Ativan discontinued. Required Precedex gtt overnight.  - Advanced tube feeds to goal of 45 mL/hr with Jevity.  - Diuresis with Lasix 20 mg IV x1 dose.  - 1 unit of PRBCs given for HCT of 22.5 with response to 29.1.  - Febrile to 38.9 C during the day and was pan-cultured. UA grossly positive but ID recommended monitoring her instead of adding Gram negative coverage as the fever is likely drug related as she does have a drug rash. Started Benadryl PRN itching. Became febrile again overnight to 39.6 and was given Tylenol.    SUBJECTIVE/ROS:  [ ] A ten-point review of systems was otherwise negative except as noted.  [x] Due to altered mental status/intubation, subjective information were not able to be obtained from the patient. History was obtained, to the extent possible, from review of the chart and collateral sources of information.    NEURO  Exam: awake, intermittently agitated, oriented to self only, follows commands, can reorient occasionally  Meds:  - clonazePAM Tablet 2 milliGRAM(s) Oral at bedtime  - dexmedetomidine Infusion 0.5 MICROgram(s)/kG/Hr IV Continuous <Continuous>  - HYDROmorphone  Injectable 0.5 milliGRAM(s) IV Push every 4 hours PRN Severe Breakthrough Pain  - melatonin 3 milliGRAM(s) Oral <User Schedule>  - mirtazapine 45 milliGRAM(s) Oral <User Schedule>  - oxyCODONE    Solution 5 milliGRAM(s) Enteral Tube every 4 hours PRN Severe Pain (7 - 10)  - QUEtiapine 50 milliGRAM(s) Oral <User Schedule>  - sertraline 50 milliGRAM(s) Oral daily  - diphenhydrAMINE   Injectable 25 milliGRAM(s) IV Push every 6 hours  [x] Adequacy of sedation and pain control has been assessed and adjusted    RESPIRATORY  RR: 23 (08-10-18 @ 04:00) (15 - 37)  SpO2: 96% (08-10-18 @ 04:00) (95% - 100%)  Exam: decreased bibasilar breath sounds with some rhonchi in the upper lung fields  Mechanical Ventilation: no  [N/A] Extubation Readiness Assessed  Meds:  - ALBUTerol/ipratropium for Nebulization 3 milliLiter(s) Nebulizer every 6 hours  - buDESOnide   0.5 milliGRAM(s) Respule 0.5 milliGRAM(s) Inhalation every 12 hours  - predniSONE   Tablet 10 milliGRAM(s) Oral every 24 hours    CARDIOVASCULAR  HR: 121 (08-10-18 @ 04:00) (101 - 129)  BP: 158/72 (08-10-18 @ 04:00) (137/71 - 175/81)  BP(mean): 104 (08-10-18 @ 04:00) (83 - 130)  Exam: regular rate and rhythm, S1S2  Cardiac Rhythm: sinus  Perfusion    [x]Adequate    [ ]Inadequate  Mentation   [ ]Normal       [x]Reduced (dementia at baseline)  Extremities  [x]Warm         [ ]Cool  Volume Status [x]Hypervolemic [ ]Euvolemic [ ]Hypovolemic  Meds: none    GI/NUTRITION  Exam: soft, nontender, nondistended  Diet: NPO with Jevity at 20 mL/hr via NG tube    Meds: famotidine    Tablet 20 milliGRAM(s) Oral daily    GENITOURINARY      140  |  104  |  28<H>  ----------------------------<  148<H>  4.2   |  23  |  1.12    Ca    7.3<L>      10 Aug 2018 03:29  Phos  2.7  Mg     1.5    [x] Blackman catheter, indication: immobilization due to bilateral acetabular fractures  Meds: none    HEMATOLOGIC  Meds: enoxaparin Injectable 40 milliGRAM(s) SubCutaneous daily  [x] VTE Prophylaxis                        9.2    14.4  )-----------( 343      ( 10 Aug 2018 03:29 )             28.7     INFECTIOUS DISEASES  T(C): 39.6 (08-10-18 @ 03:00), Max: 39.6 (08-10-18 @ 03:00)  WBC Count:  - 14.4 K/uL (08-10 @ 03:29)  - 15.5 K/uL (08-09 @ 19:57)    Recent Cultures:    Specimen Source: .Blood Blood, 08-08 @ 08:49  Results: No growth to date.; Gram Stain: --; Organism: --    Specimen Source: .Blood Blood-Peripheral, 08-07 @ 09:00  Results: No growth to date.; Gram Stain: --; Organism: --    Specimen Source: .Blood Blood-Peripheral, 08-06 @ 15:50  Results: No growth to date.; Gram Stain: --; Organism: --    Specimen Source: .Blood Blood-Peripheral, 08-05 @ 06:16  Results: Growth in aerobic and anaerobic bottles: Coag Negative Staphylococcus (Single set isolate, possible contaminant)  Gram Stain: Growth in aerobic and anaerobic bottles: Gram Positive Cocci in Clusters    Specimen Source: .Tissue Other, sxiorium right hip, 08-04 @ 07:29  Results: No growth  Gram Stain: Moderate polymorphonuclear leukocytes seen per low power field; Few Gram Positive Cocci seen per oil power field; Organism: --    Specimen Source: .Blood Blood, 08-04 @ 07:00;  Results: No growth to date.; Gram Stain: --; Organism: --    Specimen Source: .Blood Blood-Peripheral, 08-01 @ 08:47  Results: Growth in anaerobic bottle: Methicillin resistant Staphylococcus aureus  Gram Stain: Growth in anaerobic bottle: Gram Positive Cocci in Clusters; Organism: --    Meds:  - DAPTOmycin IVPB 500 milliGRAM(s) IV Intermittent every 24 hours  - rifampin IVPB 600 milliGRAM(s) IV Intermittent every 24 hours     ENDOCRINE  Capillary Blood Glucose:  - 165 mg/dL (10 Aug 2018 03:02)  - 122 mg/dL (09 Aug 2018 22:25)  - 121 mg/dL (09 Aug 2018 17:24)  - 122 mg/dL (09 Aug 2018 15:11)  - 120 mg/dL (09 Aug 2018 09:53)  - 137 mg/dL (09 Aug 2018 06:25)  Meds: insulin lispro (HumaLOG) corrective regimen sliding scale   SubCutaneous every 4 hours    ACCESS DEVICES:  [x] Peripheral IV  [x] Central Venous Line	[x] R	[ ] L	[x] IJ	[ ] Fem	[ ] SC	Placed: 8/8/2018  [ ] Arterial Line		[ ] R	[ ] L	[ ] Fem	[ ] Rad	[ ] Ax	Placed:   [ ] PICC:					[ ] Mediport  [x] Urinary Catheter, Date Placed: 7/26/2018  [x] Necessity of urinary, arterial, and venous catheters discussed    OTHER MEDICATIONS:  - chlorhexidine 4% Liquid 1 Application(s) Topical <User Schedule>  - FIRST- Mouthwash  BLM 5 milliLiter(s) Swish and Spit daily    CODE STATUS: Full code.    IMAGING:

## 2018-08-10 NOTE — PROGRESS NOTE ADULT - ASSESSMENT
68F s/p R hip PJI explant and placement of cement spacer w/ ORIF distal femur POD7    Pain control  DVT ppx  FU blood cultures  Cont abx per ID  NWB RLE  FFWB LLE  PT/OT  Will likely d/c incisional vac today  FU Xray pelvis w/ Judet views/XR R femur for today  Monitor drain output

## 2018-08-10 NOTE — PROGRESS NOTE ADULT - SUBJECTIVE AND OBJECTIVE BOX
CC: f/u for  mrsa bacteremia and hip infection  Patient reports she is ok, now on bipap, cannot get accurate ros very confused    REVIEW OF SYSTEMS:  All other review of systems negative (Comprehensive ROS)    Antimicrobials Day #  :  DAPTOmycin IVPB 500 milliGRAM(s) IV Intermittent every 24 hours  rifampin IVPB 600 milliGRAM(s) IV Intermittent every 24 hours  rifampin IVPB        Other Medications Reviewed    T(F): 100.3 (08-10-18 @ 08:00), Max: 103.3 (08-10-18 @ 03:00)  HR: 121 (08-10-18 @ 09:18)  BP: 149/76 (08-10-18 @ 09:00)  RR: 39 (08-10-18 @ 09:00)  SpO2: 100% (08-10-18 @ :18)  Wt(kg): --    PHYSICAL EXAM:  General: alert, on bipap  Eyes:  anicteric, no conjunctival injection, no discharge  Oropharynx: no lesions or injection 	  Neck: supple, without adenopathy  Lungs: course bs, no egophony to auscultation  Heart:s1s2 no murmur, rubs or gallops  Abdomen: soft, nondistended, nontender, without mass or organomegaly  Skin: macular rash on trunk, a blister on llq  Extremities: no clubbing, cyanosis, . arms with  edema, hip wound with vac on right  Neurologic: alert, confused, moves all extremities    LAB RESULTS:                        9.2    14.4  )-----------( 343      ( 10 Aug 2018 03:29 )             28.7     08-10    140  |  104  |  28<H>  ----------------------------<  148<H>  4.2   |  23  |  1.12    Ca    7.3<L>      10 Aug 2018 03:29  Phos  2.7     08-10  Mg     1.5     08-10        Urinalysis Basic - ( 09 Aug 2018 16:40 )    Color: Yellow / Appearance: Clear / S.007 / pH: x  Gluc: x / Ketone: Negative  / Bili: Negative / Urobili: Negative   Blood: x / Protein: Negative / Nitrite: Negative   Leuk Esterase: Large / RBC: 0-2 /HPF / WBC >50 /HPF   Sq Epi: x / Non Sq Epi: x / Bacteria: x      MICROBIOLOGY:  RECENT CULTURES:   @ 08:49 .Blood Blood     No growth to date.       @ 09:00 .Blood Blood-Peripheral     No growth to date.       @ 15:50 .Blood Blood-Peripheral     No growth to date.          RADIOLOGY REVIEWED:    < from: Xray Chest 1 View- PORTABLE-Urgent (18 @ 09:02) >  EXAM:  XR CHEST PORTABLE URGENT 1V                            PROCEDURE DATE:  2018            INTERPRETATION:  CLINICAL INFORMATION: Assess NG tube placement    EXAM: AP portable chest     COMPARISON: Chest radiograph from 2018 6:54 AM.    FINDINGS:    The enteric tube is in place with its tip in the stomach. The right-sided   IJ approach central venous catheter is in place with its tip in the SVC.   Surgical clips are noted in the right upper quadrant.    The cardiac size cannot be adequately assessed in this view.  Minimal improvement of the hazy opacities in the right mid to upper lung.   No new focal opacities or pneumothorax. Left costophrenic angle is out of   the view. The visualized osseous and soft tissue structures are within   normal limits.    IMPRESSION:     The enteric tube is in place with its tip in the stomach.    Minimal improvement of the hazy opacities in the right mid to upper lung.   end of copied text >    Assessment:  Patient admitted with severe sepsis from right hip prosthetic septic arthritis s/p washout but failed to achieve source control with persistent positive blood cultures and no other protected focus apparent. She has negative blood cultures since leisa, switch from vanco to dapto, ceftaroline and now on dapto and rifampin. She developed a rash over past 72 hours presumed from ceftaroline and it looks better. Yesterday thought to have drug fever since otherwise fairly stable but overnight 103 temp, remains tachy, now on bipap, seems more confused and now high wbc and high procal so I am concerned about a secondary infection. Pierre associated uti is possible, pneumonia possible. She has pastey stool but doesnt seem like cdif. Line is new, no other source apparent. Hip seems ok with vac. I am a bit concerned about possible eosinophilic pneumonia to dapto too.   Plan: Repeat blood cultures and urine culture pends  change daptomycin to vancomycin in case lung with mrsa and in case dapto induced eosinophilic pneumonia  add meropenem, hopefully won't add to rash from ceftaroline  check differential on wbc to see if has eosinophila  can stop rifampin for now

## 2018-08-10 NOTE — PROGRESS NOTE ADULT - SUBJECTIVE AND OBJECTIVE BOX
Pt S/E at bedside. Pt still intermittently agitated. SICU restarted on klonopin and started on precedex gtt overnight. Pt also had temp of 103.3. Received 1 unit PRBC yesterday as well.     Vital Signs Last 24 Hrs  T(C): 39.6 (10 Aug 2018 03:00), Max: 39.6 (10 Aug 2018 03:00)  T(F): 103.3 (10 Aug 2018 03:00), Max: 103.3 (10 Aug 2018 03:00)  HR: 119 (10 Aug 2018 05:14) (101 - 129)  BP: 137/63 (10 Aug 2018 05:00) (137/63 - 175/81)  BP(mean): 91 (10 Aug 2018 05:00) (83 - 130)  RR: 25 (10 Aug 2018 05:00) (15 - 37)  SpO2: 100% (10 Aug 2018 05:14) (95% - 100%)    Gen: NAD, awake/alert, follows commands    Right Lower Extremity:  +incisional vac c/d/i, +LUCY  +EHL/FHL/TA/GS  SILT L3-S1  +DP/PT Pulses  Compartments soft  No calf TTP B/L

## 2018-08-10 NOTE — PROGRESS NOTE ADULT - SUBJECTIVE AND OBJECTIVE BOX
Anesthesia called for urgent intubation for respiratory distress.  On arrival, pt. responsive (hx. of Alzheimer's dementia) on BiPAP.  Presently VSS, tachypneic and O2 sat 100%.  Bag mask ventilation with 100% FiO2 and smooth IV induction given with Etomidate 6 mg, Propofol 70mg and Zemuron 40 mg.   DL x 1 with Glide Scope 3 blade, excellent view with significant dried mucous noted. #7.0 cuffed ETT passed without trauma, + ETCO2 via EasyCap, + BBS, taped at 20 cm @ lip.  Teeth intact, no complications.  VSS stable. BP syst 130's and O2 sats 100%. Continued care by SICU . CXR to be checked by SICU. Vent support and sedation begun by SICU.

## 2018-08-11 LAB
ALBUMIN SERPL ELPH-MCNC: 1.5 G/DL — LOW (ref 3.3–5)
ALP SERPL-CCNC: 125 U/L — HIGH (ref 40–120)
ALT FLD-CCNC: 29 U/L — SIGNIFICANT CHANGE UP (ref 10–45)
ANION GAP SERPL CALC-SCNC: 10 MMOL/L — SIGNIFICANT CHANGE UP (ref 5–17)
APPEARANCE UR: ABNORMAL
AST SERPL-CCNC: 33 U/L — SIGNIFICANT CHANGE UP (ref 10–40)
BASOPHILS # BLD AUTO: 0 K/UL — SIGNIFICANT CHANGE UP (ref 0–0.2)
BASOPHILS NFR BLD AUTO: 0.1 % — SIGNIFICANT CHANGE UP (ref 0–2)
BILIRUB DIRECT SERPL-MCNC: 0.3 MG/DL — HIGH (ref 0–0.2)
BILIRUB INDIRECT FLD-MCNC: 0.3 MG/DL — SIGNIFICANT CHANGE UP (ref 0.2–1)
BILIRUB SERPL-MCNC: 0.6 MG/DL — SIGNIFICANT CHANGE UP (ref 0.2–1.2)
BILIRUB UR-MCNC: NEGATIVE — SIGNIFICANT CHANGE UP
BUN SERPL-MCNC: 29 MG/DL — HIGH (ref 7–23)
CA-I BLD-SCNC: 1.08 MMOL/L — LOW (ref 1.12–1.3)
CALCIUM SERPL-MCNC: 7.1 MG/DL — LOW (ref 8.4–10.5)
CHLORIDE SERPL-SCNC: 105 MMOL/L — SIGNIFICANT CHANGE UP (ref 96–108)
CO2 SERPL-SCNC: 22 MMOL/L — SIGNIFICANT CHANGE UP (ref 22–31)
COLOR SPEC: YELLOW — SIGNIFICANT CHANGE UP
CREAT SERPL-MCNC: 1.02 MG/DL — SIGNIFICANT CHANGE UP (ref 0.5–1.3)
CULTURE RESULTS: SIGNIFICANT CHANGE UP
CULTURE RESULTS: SIGNIFICANT CHANGE UP
DIFF PNL FLD: ABNORMAL
EOSINOPHIL # BLD AUTO: 0.2 K/UL — SIGNIFICANT CHANGE UP (ref 0–0.5)
EOSINOPHIL NFR BLD AUTO: 1.6 % — SIGNIFICANT CHANGE UP (ref 0–6)
GAS PNL BLDA: SIGNIFICANT CHANGE UP
GLUCOSE SERPL-MCNC: 132 MG/DL — HIGH (ref 70–99)
GLUCOSE UR QL: NEGATIVE — SIGNIFICANT CHANGE UP
HCT VFR BLD CALC: 26.1 % — LOW (ref 34.5–45)
HGB BLD-MCNC: 8.4 G/DL — LOW (ref 11.5–15.5)
KETONES UR-MCNC: NEGATIVE — SIGNIFICANT CHANGE UP
LEUKOCYTE ESTERASE UR-ACNC: ABNORMAL
LYMPHOCYTES # BLD AUTO: 0.9 K/UL — LOW (ref 1–3.3)
LYMPHOCYTES # BLD AUTO: 8.8 % — LOW (ref 13–44)
MAGNESIUM SERPL-MCNC: 2.2 MG/DL — SIGNIFICANT CHANGE UP (ref 1.6–2.6)
MCHC RBC-ENTMCNC: 29.4 PG — SIGNIFICANT CHANGE UP (ref 27–34)
MCHC RBC-ENTMCNC: 32.4 GM/DL — SIGNIFICANT CHANGE UP (ref 32–36)
MCV RBC AUTO: 90.8 FL — SIGNIFICANT CHANGE UP (ref 80–100)
MONOCYTES # BLD AUTO: 0.6 K/UL — SIGNIFICANT CHANGE UP (ref 0–0.9)
MONOCYTES NFR BLD AUTO: 6.6 % — SIGNIFICANT CHANGE UP (ref 2–14)
NEUTROPHILS # BLD AUTO: 8.1 K/UL — HIGH (ref 1.8–7.4)
NEUTROPHILS NFR BLD AUTO: 82.9 % — HIGH (ref 43–77)
NITRITE UR-MCNC: NEGATIVE — SIGNIFICANT CHANGE UP
PH UR: 6.5 — SIGNIFICANT CHANGE UP (ref 5–8)
PHOSPHATE SERPL-MCNC: 3.4 MG/DL — SIGNIFICANT CHANGE UP (ref 2.5–4.5)
PLATELET # BLD AUTO: 286 K/UL — SIGNIFICANT CHANGE UP (ref 150–400)
POTASSIUM SERPL-MCNC: 3.3 MMOL/L — LOW (ref 3.5–5.3)
POTASSIUM SERPL-SCNC: 3.3 MMOL/L — LOW (ref 3.5–5.3)
PROCALCITONIN SERPL-MCNC: 2.87 NG/ML — HIGH (ref 0.02–0.1)
PROT SERPL-MCNC: 4.2 G/DL — LOW (ref 6–8.3)
PROT UR-MCNC: 150 MG/DL
RBC # BLD: 2.87 M/UL — LOW (ref 3.8–5.2)
RBC # FLD: 15 % — HIGH (ref 10.3–14.5)
SODIUM SERPL-SCNC: 137 MMOL/L — SIGNIFICANT CHANGE UP (ref 135–145)
SP GR SPEC: 1.02 — SIGNIFICANT CHANGE UP (ref 1.01–1.02)
SPECIMEN SOURCE: SIGNIFICANT CHANGE UP
SPECIMEN SOURCE: SIGNIFICANT CHANGE UP
UROBILINOGEN FLD QL: NEGATIVE — SIGNIFICANT CHANGE UP
WBC # BLD: 9.8 K/UL — SIGNIFICANT CHANGE UP (ref 3.8–10.5)
WBC # FLD AUTO: 9.8 K/UL — SIGNIFICANT CHANGE UP (ref 3.8–10.5)

## 2018-08-11 PROCEDURE — 99291 CRITICAL CARE FIRST HOUR: CPT

## 2018-08-11 PROCEDURE — 71045 X-RAY EXAM CHEST 1 VIEW: CPT | Mod: 26

## 2018-08-11 RX ORDER — ACETAMINOPHEN 500 MG
1000 TABLET ORAL ONCE
Qty: 0 | Refills: 0 | Status: COMPLETED | OUTPATIENT
Start: 2018-08-11 | End: 2018-08-11

## 2018-08-11 RX ORDER — CALCIUM GLUCONATE 100 MG/ML
2 VIAL (ML) INTRAVENOUS ONCE
Qty: 0 | Refills: 0 | Status: COMPLETED | OUTPATIENT
Start: 2018-08-11 | End: 2018-08-11

## 2018-08-11 RX ORDER — POTASSIUM CHLORIDE 20 MEQ
20 PACKET (EA) ORAL
Qty: 0 | Refills: 0 | Status: COMPLETED | OUTPATIENT
Start: 2018-08-11 | End: 2018-08-11

## 2018-08-11 RX ADMIN — Medication 0.5 MILLIGRAM(S): at 18:34

## 2018-08-11 RX ADMIN — Medication 2 MILLIGRAM(S): at 21:49

## 2018-08-11 RX ADMIN — CHLORHEXIDINE GLUCONATE 1 APPLICATION(S): 213 SOLUTION TOPICAL at 05:19

## 2018-08-11 RX ADMIN — SERTRALINE 50 MILLIGRAM(S): 25 TABLET, FILM COATED ORAL at 11:27

## 2018-08-11 RX ADMIN — HYDROMORPHONE HYDROCHLORIDE 0.5 MILLIGRAM(S): 2 INJECTION INTRAMUSCULAR; INTRAVENOUS; SUBCUTANEOUS at 19:30

## 2018-08-11 RX ADMIN — LIDOCAINE 1 PATCH: 4 CREAM TOPICAL at 10:35

## 2018-08-11 RX ADMIN — DIPHENHYDRAMINE HYDROCHLORIDE AND LIDOCAINE HYDROCHLORIDE AND ALUMINUM HYDROXIDE AND MAGNESIUM HYDRO 5 MILLILITER(S): KIT at 11:27

## 2018-08-11 RX ADMIN — PROPOFOL 3.02 MICROGRAM(S)/KG/MIN: 10 INJECTION, EMULSION INTRAVENOUS at 21:48

## 2018-08-11 RX ADMIN — Medication 20 MILLIEQUIVALENT(S): at 05:19

## 2018-08-11 RX ADMIN — Medication 3 MILLILITER(S): at 12:20

## 2018-08-11 RX ADMIN — LIDOCAINE 1 PATCH: 4 CREAM TOPICAL at 22:05

## 2018-08-11 RX ADMIN — FAMOTIDINE 20 MILLIGRAM(S): 10 INJECTION INTRAVENOUS at 11:27

## 2018-08-11 RX ADMIN — Medication 400 MILLIGRAM(S): at 20:02

## 2018-08-11 RX ADMIN — MIRTAZAPINE 45 MILLIGRAM(S): 45 TABLET, ORALLY DISINTEGRATING ORAL at 21:49

## 2018-08-11 RX ADMIN — Medication 3 MILLILITER(S): at 23:55

## 2018-08-11 RX ADMIN — MEROPENEM 100 MILLIGRAM(S): 1 INJECTION INTRAVENOUS at 05:21

## 2018-08-11 RX ADMIN — CHLORHEXIDINE GLUCONATE 15 MILLILITER(S): 213 SOLUTION TOPICAL at 05:19

## 2018-08-11 RX ADMIN — Medication 200 GRAM(S): at 05:49

## 2018-08-11 RX ADMIN — CHLORHEXIDINE GLUCONATE 15 MILLILITER(S): 213 SOLUTION TOPICAL at 13:39

## 2018-08-11 RX ADMIN — Medication 3 MILLIGRAM(S): at 21:49

## 2018-08-11 RX ADMIN — MEROPENEM 100 MILLIGRAM(S): 1 INJECTION INTRAVENOUS at 21:49

## 2018-08-11 RX ADMIN — Medication 2: at 18:13

## 2018-08-11 RX ADMIN — Medication 20 MILLIEQUIVALENT(S): at 09:29

## 2018-08-11 RX ADMIN — HYDROMORPHONE HYDROCHLORIDE 0.5 MILLIGRAM(S): 2 INJECTION INTRAMUSCULAR; INTRAVENOUS; SUBCUTANEOUS at 19:45

## 2018-08-11 RX ADMIN — Medication 250 MILLIGRAM(S): at 05:19

## 2018-08-11 RX ADMIN — Medication 10 MILLIGRAM(S): at 11:27

## 2018-08-11 RX ADMIN — Medication 3 MILLILITER(S): at 18:34

## 2018-08-11 RX ADMIN — CHLORHEXIDINE GLUCONATE 15 MILLILITER(S): 213 SOLUTION TOPICAL at 21:49

## 2018-08-11 RX ADMIN — Medication 0.5 MILLIGRAM(S): at 06:12

## 2018-08-11 RX ADMIN — Medication 3 MILLILITER(S): at 00:22

## 2018-08-11 RX ADMIN — Medication 250 MILLIGRAM(S): at 17:56

## 2018-08-11 RX ADMIN — Medication 20 MILLIEQUIVALENT(S): at 10:35

## 2018-08-11 RX ADMIN — ENOXAPARIN SODIUM 40 MILLIGRAM(S): 100 INJECTION SUBCUTANEOUS at 11:27

## 2018-08-11 RX ADMIN — QUETIAPINE FUMARATE 50 MILLIGRAM(S): 200 TABLET, FILM COATED ORAL at 21:49

## 2018-08-11 RX ADMIN — Medication 2: at 21:48

## 2018-08-11 RX ADMIN — Medication 3 MILLILITER(S): at 06:12

## 2018-08-11 RX ADMIN — MEROPENEM 100 MILLIGRAM(S): 1 INJECTION INTRAVENOUS at 13:39

## 2018-08-11 NOTE — PROGRESS NOTE ADULT - SUBJECTIVE AND OBJECTIVE BOX
Pulmonary Consult Note    MYRIAM HEATH  MRN-[id number]    Chief Complaint: Patient is a 68y old  Female who presents with a chief complaint of weakness (26 Jul 2018 16:54)      HPI:  68yFemale   -overnight respiratory and required anesthesia for intubation and ventilatory support  Sedated  -MRSA post ORTHO surgery  -  -    ROS:  -Dementia Severe GERD   Chronic asthma  -  All other systems reviewed and negative    PAST MEDICAL HISTORY: HEALTH ISSUES - PROBLEM Dx:  Fever, unspecified fever cause: Fever, unspecified fever cause  Anxiety: Anxiety  IGT (impaired glucose tolerance): IGT (impaired glucose tolerance)  Metabolic acidosis, NAG, bicarbonate losses: Metabolic acidosis, NAG, bicarbonate losses  Hypokalemia: Hypokalemia  Sepsis due to methicillin susceptible Staphylococcus aureus: Sepsis due to methicillin susceptible Staphylococcus aureus  Hyponatremia: Hyponatremia  Pelvic fracture: Pelvic fracture  Asthma: Asthma  Electrolyte abnormality: Electrolyte abnormality  Troponin level elevated: Troponin level elevated  Hypoglycemia: Hypoglycemia  Metabolic acidosis with increased anion gap and accumulation of organic acids: Metabolic acidosis with increased anion gap and accumulation of organic acids  Urinary frequency: Urinary frequency  Toxic metabolic encephalopathy: Toxic metabolic encephalopathy  Acute back pain with sciatica, left: Acute back pain with sciatica, left  Severe sepsis: Severe sepsis  Weakness of left lower extremity: Weakness of left lower extremity          SOCIAL HISTORY:     ACTIVE MEDICATION LIST:  MEDICATIONS  (STANDING):  ALBUTerol/ipratropium for Nebulization 3 milliLiter(s) Nebulizer every 6 hours  buDESOnide   0.5 milliGRAM(s) Respule 0.5 milliGRAM(s) Inhalation every 12 hours  chlorhexidine 0.12% Liquid 15 milliLiter(s) Swish and Spit <User Schedule>  chlorhexidine 4% Liquid 1 Application(s) Topical <User Schedule>  clonazePAM Tablet 2 milliGRAM(s) Oral at bedtime  enoxaparin Injectable 40 milliGRAM(s) SubCutaneous daily  famotidine    Tablet 20 milliGRAM(s) Oral daily  FIRST- Mouthwash  BLM 5 milliLiter(s) Swish and Spit daily  insulin lispro (HumaLOG) corrective regimen sliding scale   SubCutaneous every 4 hours  lidocaine   Patch 1 Patch Transdermal every 24 hours  melatonin 3 milliGRAM(s) Oral <User Schedule>  meropenem  IVPB 1000 milliGRAM(s) IV Intermittent every 8 hours  mirtazapine 45 milliGRAM(s) Oral <User Schedule>  potassium chloride   Solution 20 milliEquivalent(s) Enteral Tube every 2 hours  predniSONE   Tablet 10 milliGRAM(s) Oral every 24 hours  propofol Infusion 10 MICROgram(s)/kG/Min (3.024 mL/Hr) IV Continuous <Continuous>  QUEtiapine 50 milliGRAM(s) Oral <User Schedule>  sertraline 50 milliGRAM(s) Oral daily  vancomycin  IVPB 750 milliGRAM(s) IV Intermittent every 12 hours    MEDICATIONS  (PRN):  HYDROmorphone  Injectable 0.5 milliGRAM(s) IV Push every 4 hours PRN Severe Breakthrough Pain  oxyCODONE    Solution 5 milliGRAM(s) Enteral Tube every 4 hours PRN Severe Pain (7 - 10)      EXAM:  Vital Signs Last 24 Hrs  T(C): 36.7 (11 Aug 2018 03:00), Max: 38.2 (10 Aug 2018 12:00)  T(F): 98.1 (11 Aug 2018 03:00), Max: 100.7 (10 Aug 2018 12:00)  HR: 98 (11 Aug 2018 06:12) (96 - 123)  BP: 105/57 (11 Aug 2018 06:00) (94/51 - 171/98)  BP(mean): 76 (11 Aug 2018 06:00) (67 - 127)  RR: 23 (11 Aug 2018 06:00) (22 - 45)  SpO2: 100% (11 Aug 2018 06:12) (95% - 100%)  GENERAL: No acute distress  NEURO: Alert and oriented x 3  LUNGS: Clear to auscultation bilaterally, no rales or wheezes  CV: S1/S2, no murmur  ABDOMEN: +BS, nontender  EXTREMITIES: no clubbing, cyanosis, edema      Procalcitonin, Serum (08.11.18 @ 03:03)    Procalcitonin, Serum: 2.87: This assay is intended for use to determine the change of PCT over time  as an aid in assessing the cumulative 28-day risk of all-cause mortality  for patients diagnosed with severe sepsis or septic shock in the ICU, or  when obtained in the emergency department or other medical wards prior to  ICU admission. This assay was performed by the [employer] PCT  assay. ng/mL                        8.4    9.8   )-----------( 286      ( 11 Aug 2018 02:54 )             26.1   08-11    137  |  105  |  29<H>  ----------------------------<  132<H>  3.3<L>   |  22  |  1.02    Ca    7.1<L>      11 Aug 2018 02:54  Phos  3.4     08-11  Mg     2.2     08-11    TPro  4.2<L>  /  Alb  1.5<L>  /  TBili  0.6  /  DBili  0.3<H>  /  AST  33  /  ALT  29  /  AlkPhos  125<H>  08-11  ABG - ( 11 Aug 2018 02:48 )  pH, Arterial: 7.52  pH, Blood: x     /  pCO2: 32    /  pO2: 168   / HCO3: 26    / Base Excess: 3.7   /  SaO2: 100         < from: Xray Chest 1 View- PORTABLE-Routine (08.10.18 @ 07:08) >  FINDINGS:   Enteric tube is in the stomach.  Right-sided IJ approach central venous catheter tip is in the distal SVC.  Can't accurately assess heart size on this projection.  Mid right lung haziness is stable - due to asymmetric pulmonary vascular   congestion or infectious process.  Unchanged small left pleural effusion.  Unchanged left basilar opacity.  No pneumothorax.  No acute bony abnormalities.    IMPRESSION:   No significant interval change in bilateral pleural-parenchymal pattern.  Life supporting devices in appropriate position.    < end of copied text >              PROBLEM LIST:  68yFemale with HEALTH ISSUES - PROBLEM Dx:  Fever, unspecified fever cause: Fever, unspecified fever cause  Anxiety: Anxiety  IGT (impaired glucose tolerance): IGT (impaired glucose tolerance  Metabolic acidosis, NAG, bicarbonate losses: Metabolic acidosis, NAG, bicarbonate losses  Hypokalemia: Hypokalemia  Sepsis due to methicillin susceptible Staphylococcus aureus: Sepsis due to methicillin susceptible Staphylococcus aureus  Hyponatremia: Hyponatremia  Pelvic fracture: Pelvic fracture  Asthma: Asthma  Electrolyte abnormality: Electrolyte abnormality  Troponin level elevated: Troponin level elevated  Hypoglycemia: Hypoglycemia  Metabolic acidosis with increased anion gap and accumulation of organic acids: Metabolic acidosis with increased anion gap and accumulation of organic acids  Urinary frequency: Urinary frequency  Toxic metabolic encephalopathy: Toxic metabolic encephalopathy  Acute back pain with sciatica, left: Acute back pain with sciatica, left  Severe sepsis: Severe sepsis  Weakness of left lower extremity: Weakness of left lower extremity            RECS:  -Ventilatory support  noted setting  Can lower rate  with noted ABG and component of resp alkalosis  Decrease FiO2 based on O2 sats and noted ABG  supportive SICU  care   Iv Antibx   Pulomonary ondina  -    Thank you for this consultation, please feel free to call with any questions [phone]  [facility]ito [facility] DO [facility]

## 2018-08-11 NOTE — PROGRESS NOTE ADULT - SUBJECTIVE AND OBJECTIVE BOX
HISTORY  68 year old female with a past medical history of asthma on steroids, CVA (no residual deficits), pulmonary HTN, HLD, GERD, ulcerative colitis, fatty pancreas, impaired glucose tolerance, anxiety, and depression who presented on 7/26/2018 with altered mental status, rigors, and urinary frequency. Of note, patient was recently hospitalized for a right femoral neck fracture secondary to osteoporosis s/p hemiarthroplasty on 6/22/2018. She had been taking Plavix for her history of CVA and Lovenox for VTE prophylaxis but she developed a hematoma at her surgical site so the Lovenox was stopped. Since then, patient has been progressively more agitated, confused, and weak. Additionally, patient has been complaining of new onset low back pain and left hip pain. In the ED, patient was hemodynamically stable but noted to be hypoglycemic. She was also started on meropenem & vancomycin for concern of sepsis. CT scan revealed a 17 cm collection adjacent to the right hip prosthesis, an acute L2 compression fracture, and new left acetabular fracture. Patient was admitted to medicine but on 7/27/2018, patient was noted to be more altered, rigorous, tachypneic, febrile to 101 F, and hypotensive w/ SBP in the 60s so an RRT was called. She was more fluid and started on a norepinephrine gtt. Patient subsequently admitted to SICU for hemodynamic monitoring. Blood cultures positive for MRSA. Decision was made to take the patient to the OR on 7/27/2018 for right hip I&D and exchange of the femoral head. She returned to the SICU intubated on vasopressor support. She was extubated on 7/30/2018 but was reintubated on 8/2/2018. Additionally, patient was persistently on vasopressor support and bacteremic with MRSA. Patient was subsequently taken back to the OR on 8/4/2018 for further right hip I&D, explantation of right hemiarthroplasty, placement of antibiotic spacer, and right femur ORIF. Patient was subsequently weaned off vasopressor support on 8/5/2018 and successfully extubated on 8/6/2018.    24 HOUR EVENTS:  - Patient became tachypnea with accessory muscle use so she was placed on BiPAP but failed to improved, so she was intubated.  - Due to concern for PNA, her antibiotics were changed from daptomycin & rifampin to vancomycin & meropenem. Eosinophilic PNA from daptomycin use was ruled out.  - Left ankle x-rays obtained.    SUBJECTIVE/ROS:  [ ] A ten-point review of systems was otherwise negative except as noted.  [x] Due to altered mental status/intubation, subjective information were not able to be obtained from the patient. History was obtained, to the extent possible, from review of the chart and collateral sources of information.    NEURO  Exam: sedated, intermittently agitated, does not follow commands, moving all four extremities  Meds:  - clonazePAM Tablet 2 milliGRAM(s) Oral at bedtime  - HYDROmorphone  Injectable 0.5 milliGRAM(s) IV Push every 4 hours PRN Severe Breakthrough Pain  - melatonin 3 milliGRAM(s) Oral <User Schedule>  - mirtazapine 45 milliGRAM(s) Oral <User Schedule>  - oxyCODONE    Solution 5 milliGRAM(s) Enteral Tube every 4 hours PRN Severe Pain (7 - 10)  - propofol Infusion 10 MICROgram(s)/kG/Min IV Continuous <Continuous>  - QUEtiapine 50 milliGRAM(s) Oral <User Schedule>  - sertraline 50 milliGRAM(s) Oral daily  [x] Adequacy of sedation and pain control has been assessed and adjusted    RESPIRATORY  RR: 26 (08-11-18 @ 07:00) (22 - 45)  SpO2: 100% (08-11-18 @ 07:00) (95% - 100%)  Exam: clear to auscultation bilaterally  Mechanical Ventilation: Mode: AC/ CMV (Assist Control/ Continuous Mandatory Ventilation), RR (machine): 14, RR (patient): 20, TV (machine): 400, FiO2: 40, PEEP: 5, ITime: 1, MAP: 10, PIP: 18  [x] Extubation Readiness Assessed  ABG - ( 11 Aug 2018 02:48 )  pH: 7.52  /  pCO2: 32    /  pO2: 168   / HCO3: 26    / Base Excess: 3.7   /  SaO2: 100   /   Lactate: 0.8  Meds:  - ALBUTerol/ipratropium for Nebulization 3 milliLiter(s) Nebulizer every 6 hours  - buDESOnide   0.5 milliGRAM(s) Respule 0.5 milliGRAM(s) Inhalation every 12 hours  - predniSONE   Tablet 10 milliGRAM(s) Oral every 24 hours    CARDIOVASCULAR  HR: 96 (08-11-18 @ 07:00) (96 - 123)  BP: 110/57 (08-11-18 @ 07:00) (94/51 - 171/98)  BP(mean): 78 (08-11-18 @ 07:00) (67 - 127)  Exam: regular rate and rhythm, S1S2  Cardiac Rhythm: sinus  Perfusion    [x]Adequate    [ ]Inadequate  Mentation   [ ]Normal       [x]Reduced (dementia at baseline)  Extremities  [x]Warm         [ ]Cool  Volume Status [x]Hypervolemic [ ]Euvolemic [ ]Hypovolemic  Meds: none    GI/NUTRITION  Exam: soft, nontender, nondistended  Diet: NPO with Pivot at 45 mL/hr via NG tube  Meds: famotidine    Tablet 20 milliGRAM(s) Oral daily    GENITOURINARY  I&O's Detail    08-10 @ 07:01  -  08-11 @ 07:00  --------------------------------------------------------  IN:    Enteral Tube Flush: 100 mL    IV PiggyBack: 400 mL    Pivot: 495 mL    propofol Infusion: 140 mL    Solution: 50 mL    Solution: 350 mL  Total IN: 1535 mL    OUT:    Accordian: 45 mL    Indwelling Catheter - Urethral: 1530 mL  Total OUT: 1575 mL    Total NET: -40 mL      137  |  105  |  29<H>  ----------------------------<  132<H>  3.3<L>   |  22  |  1.02    Ca    7.1<L>      11 Aug 2018 02:54  Phos  3.4  Mg     2.2  TPro  4.2<L>  /  Alb  1.5<L>  /  TBili  0.6  /  DBili  0.3<H>  /  AST  33  /  ALT  29  /  AlkPhos  125<H>    [x] Blackman catheter, indication: immobilization due to bilateral acetabular fractures  Meds: none    HEMATOLOGIC  Meds: enoxaparin Injectable 40 milliGRAM(s) SubCutaneous daily  [x] VTE Prophylaxis                        8.4    9.8   )-----------( 286      ( 11 Aug 2018 02:54 )             26.1     INFECTIOUS DISEASES  T(C): 36.7 (08-11-18 @ 03:00), Max: 38.2 (08-10-18 @ 12:00)  WBC Count:  - 9.8 K/uL (08-11 @ 02:54)  - 14.4 K/uL (08-10 @ 11:57)    Recent Cultures:    Specimen Source: .Urine Catheterized, 08-09 @ 21:17  Results: 50,000 - 99,000 CFU/mL Presumptive Candida albicans  Gram Stain: --; Organism: --    Specimen Source: .Blood Blood, 08-09 @ 20:07  Results: No growth to date.  Gram Stain: --; Organism: --    Specimen Source: .Blood Blood, 08-09 @ 17:44  Results: No growth to date.  Gram Stain: --; Organism: --    Specimen Source: .Blood Blood, 08-08 @ 08:49  Results: No growth to date.; Gram Stain: --; Organism: --    Specimen Source: .Blood Blood-Peripheral, 08-07 @ 09:00  Results: No growth to date.; Gram Stain: --; Organism: --    Specimen Source: .Blood Blood-Peripheral, 08-06 @ 15:50  Results: No growth to date.; Gram Stain: --; Organism: --    Specimen Source: .Blood Blood-Peripheral, 08-05 @ 06:16  Results: Growth in aerobic and anaerobic bottles: Coag Negative Staphylococcus (Single set isolate, possible contaminant)  Gram Stain: Growth in aerobic and anaerobic bottles: Gram Positive Cocci in Clusters    Specimen Source: .Tissue Other, sxiorium right hip, 08-04 @ 07:29  Results: No growth  Gram Stain: Moderate polymorphonuclear leukocytes seen per low power field; Few Gram Positive Cocci seen per oil power field; Organism: --    Specimen Source: .Blood Blood, 08-04 @ 07:00;  Results: No growth to date.; Gram Stain: --; Organism: --    Specimen Source: .Blood Blood-Peripheral, 08-01 @ 08:47  Results: Growth in anaerobic bottle: Methicillin resistant Staphylococcus aureus  Gram Stain: Growth in anaerobic bottle: Gram Positive Cocci in Clusters; Organism: --    Meds:  - meropenem  IVPB 1000 milliGRAM(s) IV Intermittent every 8 hours  - vancomycin  IVPB 750 milliGRAM(s) IV Intermittent every 12 hours    ENDOCRINE  Capillary Blood Glucose:  Meds: insulin lispro (HumaLOG) corrective regimen sliding scale   SubCutaneous every 4 hours    ACCESS DEVICES:  [x] Peripheral IV  [x] Central Venous Line	[x] R	[ ] L	[x] IJ	[ ] Fem	[ ] SC	Placed: 8/8/2018  [ ] Arterial Line		[ ] R	[ ] L	[ ] Fem	[ ] Rad	[ ] Ax	Placed:   [ ] PICC:					[ ] Mediport  [x] Urinary Catheter, Date Placed: 7/26/2018  [x] Necessity of urinary, arterial, and venous catheters discussed    OTHER MEDICATIONS:  - chlorhexidine 0.12% Liquid 15 milliLiter(s) Swish and Spit <User Schedule>  - chlorhexidine 4% Liquid 1 Application(s) Topical <User Schedule>  - FIRST- Mouthwash  BLM 5 milliLiter(s) Swish and Spit daily  - lidocaine   Patch 1 Patch Transdermal every 24 hours    CODE STATUS: Full code.    IMAGING:

## 2018-08-11 NOTE — PROGRESS NOTE ADULT - ASSESSMENT
ASSESSMENT:  68 year old female presenting with septic shock from MRSA bacteremia secondary to infected right hemiarthroplasty hardware s/p right hip I&D, explantation of right hemiarthroplasty, placement of antibiotic spacer, and right femur ORIF.    PLAN:    Neuro: acute post-op pain, anxiety, depression, dementia, intubated  - Monitor mental status.  - Home regimen of Klonopin, Zoloft, Seroquel, Remeron, and melatonin for agitation related to her dementia.  - Pain control with Tylenol, oxycodone, and Dilaudid.  - Sedation with propofol while intubated.    Resp: acute respiratory distress, asthma, possible PNA  - Monitor pulse oximeter and ABGs.  - Mechanical ventilation for acute respiratory failure. Patient has been reintubated 3 times this hospitalization so will need tracheostomy.  - Home prednisone 10 mg daily, Pulmicort, and Duoneb for asthma.    CV: distributive shock (resolved)  - Monitor vital signs.    GI: no acute issues  - NPO with Pivot at goal of 45 mL/hr.  - Famotidine for GERD.  - Bowel regimen with Colace & senna.    Renal: CKD stage 2, hyponatremia  - Monitor I&Os.  - Monitor electrolytes and replete as necessary.  - Assess need for diuresis.    Heme: no acute issues  - Lovenox for VTE prophylaxis.    ID: MRSA bacteremia, infected right hip s/p I&D, explantation of hardware, & placement of antibiotic spacer, UTI, concern for PNA  - Monitor WBC, temperature, and procalcitonin.  - Follow up urine and blood cultures. Consider Combicath. Blood cultures from 8/6/2018 with no growth of MRSA.  - Vancomycin and meropenem for MRSA bacteremia and possible PNA. Appreciate ID recommendations.  - Awaiting PICC placement for long-term IV antibiotics.    Endo: hyperglycemia  - Monitor fingersticks q4hrs for hypoglycemia.    Disposition:  - Full code.  - Will remain in SICU for respiratory monitoring.    Cynthia Wilkins PA-C    [id number]

## 2018-08-11 NOTE — PROGRESS NOTE ADULT - ATTENDING COMMENTS
I saw and evaluated patient and agree with above note  I saw and evaluated patient and agree with above note.  the patient was intubated yesterday for respiratory failure  more awake and alert and not distressed  acute respiratory failure s/p 2 recent major orthopedic surgeries and poor baseline functional status  will hope the sepsis completely clears, the blood cultures have been negative and off pressors  the gas exchange is good  The patient is markedly weak and deconditioned.  I am skeptical patient will tolerate extubation.  I discussed with the patients  that tracheostomy would be another option but he would much prefer additional attempt at extubation  will maintain intubation with vent support for now but will resume breathing trials tomorrow and attempt to optimize for extubation  40 minutes of critical care management applied.

## 2018-08-11 NOTE — PROGRESS NOTE ADULT - ASSESSMENT
68F s/p R hip PJI explant and placement of cement spacer w/ ORIF distal femur  Pain control  DVT ppx lovenox  FU blood cultures  Cont abx per ID  NWB LLE  FFWB RLE  PT/OT  dc drain today  wean to extubate

## 2018-08-11 NOTE — PROGRESS NOTE ADULT - SUBJECTIVE AND OBJECTIVE BOX
Patient seen and examined. re-intubated yesterday for respiratory distress, incisional vac removed aquacell dressing placed    ICU Vital Signs Last 24 Hrs  T(C): 37.2 (11 Aug 2018 07:00), Max: 38.2 (10 Aug 2018 12:00)  T(F): 98.9 (11 Aug 2018 07:00), Max: 100.7 (10 Aug 2018 12:00)  HR: 92 (11 Aug 2018 08:00) (92 - 123)  BP: 121/67 (11 Aug 2018 08:00) (94/51 - 171/98)  BP(mean): 88 (11 Aug 2018 08:00) (67 - 127)  ABP: --  ABP(mean): --  RR: 37 (11 Aug 2018 08:00) (22 - 45)  SpO2: 100% (11 Aug 2018 08:00) (97% - 100%)      Gen: NAD, AAOx3    Right Lower Extremity:  aquacell clean dry intact  +LUCY drain x1  Grossly moving all toes and ankle  +DP/PT Pulses, foot warm/well perfused  Compartments soft  No calf TTP B/L

## 2018-08-12 LAB
ANION GAP SERPL CALC-SCNC: 12 MMOL/L — SIGNIFICANT CHANGE UP (ref 5–17)
ANION GAP SERPL CALC-SCNC: 6 MMOL/L — SIGNIFICANT CHANGE UP (ref 5–17)
B-OH-BUTYR SERPL-SCNC: 0.1 MMOL/L — SIGNIFICANT CHANGE UP
BUN SERPL-MCNC: 37 MG/DL — HIGH (ref 7–23)
BUN SERPL-MCNC: 40 MG/DL — HIGH (ref 7–23)
CA-I BLD-SCNC: 1.11 MMOL/L — LOW (ref 1.12–1.3)
CALCIUM SERPL-MCNC: 7.2 MG/DL — LOW (ref 8.4–10.5)
CALCIUM SERPL-MCNC: 7.3 MG/DL — LOW (ref 8.4–10.5)
CHLORIDE SERPL-SCNC: 101 MMOL/L — SIGNIFICANT CHANGE UP (ref 96–108)
CHLORIDE SERPL-SCNC: 104 MMOL/L — SIGNIFICANT CHANGE UP (ref 96–108)
CO2 SERPL-SCNC: 22 MMOL/L — SIGNIFICANT CHANGE UP (ref 22–31)
CO2 SERPL-SCNC: 22 MMOL/L — SIGNIFICANT CHANGE UP (ref 22–31)
CREAT SERPL-MCNC: 1.06 MG/DL — SIGNIFICANT CHANGE UP (ref 0.5–1.3)
CREAT SERPL-MCNC: 1.08 MG/DL — SIGNIFICANT CHANGE UP (ref 0.5–1.3)
CULTURE RESULTS: SIGNIFICANT CHANGE UP
GLUCOSE SERPL-MCNC: 169 MG/DL — HIGH (ref 70–99)
GLUCOSE SERPL-MCNC: 195 MG/DL — HIGH (ref 70–99)
HCT VFR BLD CALC: 26.8 % — LOW (ref 34.5–45)
HGB BLD-MCNC: 8.5 G/DL — LOW (ref 11.5–15.5)
INSULIN SERPL-MCNC: 6 UU/ML — SIGNIFICANT CHANGE UP (ref 3–17)
MAGNESIUM SERPL-MCNC: 1.9 MG/DL — SIGNIFICANT CHANGE UP (ref 1.6–2.6)
MCHC RBC-ENTMCNC: 29.2 PG — SIGNIFICANT CHANGE UP (ref 27–34)
MCHC RBC-ENTMCNC: 31.6 GM/DL — LOW (ref 32–36)
MCV RBC AUTO: 92.6 FL — SIGNIFICANT CHANGE UP (ref 80–100)
PHOSPHATE SERPL-MCNC: 2.9 MG/DL — SIGNIFICANT CHANGE UP (ref 2.5–4.5)
PLATELET # BLD AUTO: 300 K/UL — SIGNIFICANT CHANGE UP (ref 150–400)
POTASSIUM SERPL-MCNC: 4.6 MMOL/L — SIGNIFICANT CHANGE UP (ref 3.5–5.3)
POTASSIUM SERPL-MCNC: 4.7 MMOL/L — SIGNIFICANT CHANGE UP (ref 3.5–5.3)
POTASSIUM SERPL-SCNC: 4.6 MMOL/L — SIGNIFICANT CHANGE UP (ref 3.5–5.3)
POTASSIUM SERPL-SCNC: 4.7 MMOL/L — SIGNIFICANT CHANGE UP (ref 3.5–5.3)
RBC # BLD: 2.9 M/UL — LOW (ref 3.8–5.2)
RBC # FLD: 15 % — HIGH (ref 10.3–14.5)
SODIUM SERPL-SCNC: 132 MMOL/L — LOW (ref 135–145)
SODIUM SERPL-SCNC: 135 MMOL/L — SIGNIFICANT CHANGE UP (ref 135–145)
SPECIMEN SOURCE: SIGNIFICANT CHANGE UP
VANCOMYCIN TROUGH SERPL-MCNC: 19.9 UG/ML — SIGNIFICANT CHANGE UP (ref 10–20)
WBC # BLD: 7.8 K/UL — SIGNIFICANT CHANGE UP (ref 3.8–10.5)
WBC # FLD AUTO: 7.8 K/UL — SIGNIFICANT CHANGE UP (ref 3.8–10.5)

## 2018-08-12 PROCEDURE — 99291 CRITICAL CARE FIRST HOUR: CPT

## 2018-08-12 PROCEDURE — 71045 X-RAY EXAM CHEST 1 VIEW: CPT | Mod: 26

## 2018-08-12 RX ORDER — INSULIN LISPRO 100/ML
VIAL (ML) SUBCUTANEOUS EVERY 4 HOURS
Qty: 0 | Refills: 0 | Status: DISCONTINUED | OUTPATIENT
Start: 2018-08-12 | End: 2018-08-15

## 2018-08-12 RX ADMIN — Medication 3 MILLILITER(S): at 06:16

## 2018-08-12 RX ADMIN — LIDOCAINE 1 PATCH: 4 CREAM TOPICAL at 23:06

## 2018-08-12 RX ADMIN — Medication 1: at 14:38

## 2018-08-12 RX ADMIN — MEROPENEM 100 MILLIGRAM(S): 1 INJECTION INTRAVENOUS at 13:55

## 2018-08-12 RX ADMIN — Medication 1: at 23:06

## 2018-08-12 RX ADMIN — Medication 3 MILLIGRAM(S): at 22:54

## 2018-08-12 RX ADMIN — Medication 1: at 17:56

## 2018-08-12 RX ADMIN — HYDROMORPHONE HYDROCHLORIDE 0.5 MILLIGRAM(S): 2 INJECTION INTRAMUSCULAR; INTRAVENOUS; SUBCUTANEOUS at 18:15

## 2018-08-12 RX ADMIN — CHLORHEXIDINE GLUCONATE 15 MILLILITER(S): 213 SOLUTION TOPICAL at 05:00

## 2018-08-12 RX ADMIN — Medication 3 MILLILITER(S): at 17:28

## 2018-08-12 RX ADMIN — MEROPENEM 100 MILLIGRAM(S): 1 INJECTION INTRAVENOUS at 05:02

## 2018-08-12 RX ADMIN — SERTRALINE 50 MILLIGRAM(S): 25 TABLET, FILM COATED ORAL at 11:15

## 2018-08-12 RX ADMIN — CHLORHEXIDINE GLUCONATE 1 APPLICATION(S): 213 SOLUTION TOPICAL at 05:08

## 2018-08-12 RX ADMIN — Medication 250 MILLIGRAM(S): at 05:00

## 2018-08-12 RX ADMIN — LIDOCAINE 1 PATCH: 4 CREAM TOPICAL at 10:24

## 2018-08-12 RX ADMIN — QUETIAPINE FUMARATE 50 MILLIGRAM(S): 200 TABLET, FILM COATED ORAL at 22:55

## 2018-08-12 RX ADMIN — CHLORHEXIDINE GLUCONATE 15 MILLILITER(S): 213 SOLUTION TOPICAL at 13:35

## 2018-08-12 RX ADMIN — Medication 1 MILLIGRAM(S): at 21:17

## 2018-08-12 RX ADMIN — DIPHENHYDRAMINE HYDROCHLORIDE AND LIDOCAINE HYDROCHLORIDE AND ALUMINUM HYDROXIDE AND MAGNESIUM HYDRO 5 MILLILITER(S): KIT at 11:15

## 2018-08-12 RX ADMIN — MIRTAZAPINE 45 MILLIGRAM(S): 45 TABLET, ORALLY DISINTEGRATING ORAL at 22:54

## 2018-08-12 RX ADMIN — Medication 250 MILLIGRAM(S): at 17:56

## 2018-08-12 RX ADMIN — MEROPENEM 100 MILLIGRAM(S): 1 INJECTION INTRAVENOUS at 22:55

## 2018-08-12 RX ADMIN — Medication 2 MILLIGRAM(S): at 22:54

## 2018-08-12 RX ADMIN — CHLORHEXIDINE GLUCONATE 15 MILLILITER(S): 213 SOLUTION TOPICAL at 22:54

## 2018-08-12 RX ADMIN — HYDROMORPHONE HYDROCHLORIDE 0.5 MILLIGRAM(S): 2 INJECTION INTRAMUSCULAR; INTRAVENOUS; SUBCUTANEOUS at 18:00

## 2018-08-12 RX ADMIN — FAMOTIDINE 20 MILLIGRAM(S): 10 INJECTION INTRAVENOUS at 11:14

## 2018-08-12 RX ADMIN — ENOXAPARIN SODIUM 40 MILLIGRAM(S): 100 INJECTION SUBCUTANEOUS at 11:14

## 2018-08-12 RX ADMIN — Medication 0.5 MILLIGRAM(S): at 17:28

## 2018-08-12 RX ADMIN — Medication 3 MILLILITER(S): at 23:27

## 2018-08-12 RX ADMIN — Medication 2: at 02:57

## 2018-08-12 RX ADMIN — Medication 3 MILLILITER(S): at 12:08

## 2018-08-12 RX ADMIN — PROPOFOL 3.02 MICROGRAM(S)/KG/MIN: 10 INJECTION, EMULSION INTRAVENOUS at 22:55

## 2018-08-12 RX ADMIN — Medication 10 MILLIGRAM(S): at 10:24

## 2018-08-12 RX ADMIN — Medication 0.5 MILLIGRAM(S): at 06:15

## 2018-08-12 NOTE — PROGRESS NOTE ADULT - SUBJECTIVE AND OBJECTIVE BOX
HISTORY  68 year old female with a past medical history of asthma on steroids, CVA (no residual deficits), pulmonary HTN, HLD, GERD, ulcerative colitis, fatty pancreas, impaired glucose tolerance, anxiety, and depression who presented on 7/26/2018 with altered mental status, rigors, and urinary frequency. Of note, patient was recently hospitalized for a right femoral neck fracture secondary to osteoporosis s/p hemiarthroplasty on 6/22/2018. She had been taking Plavix for her history of CVA and Lovenox for VTE prophylaxis but she developed a hematoma at her surgical site so the Lovenox was stopped. Since then, patient has been progressively more agitated, confused, and weak. Additionally, patient has been complaining of new onset low back pain and left hip pain. In the ED, patient was hemodynamically stable but noted to be hypoglycemic. She was also started on meropenem & vancomycin for concern of sepsis. CT scan revealed a 17 cm collection adjacent to the right hip prosthesis, an acute L2 compression fracture, and new left acetabular fracture. Patient was admitted to medicine but on 7/27/2018, patient was noted to be more altered, rigorous, tachypneic, febrile to 101 F, and hypotensive w/ SBP in the 60s so an RRT was called. She was more fluid and started on a norepinephrine gtt. Patient subsequently admitted to SICU for hemodynamic monitoring. Blood cultures positive for MRSA. Decision was made to take the patient to the OR on 7/27/2018 for right hip I&D and exchange of the femoral head. She returned to the SICU intubated on vasopressor support. She was extubated on 7/30/2018 but was reintubated on 8/2/2018. Additionally, patient was persistently on vasopressor support and bacteremic with MRSA. Patient was subsequently taken back to the OR on 8/4/2018 for further right hip I&D, explantation of right hemiarthroplasty, placement of antibiotic spacer, and right femur ORIF. Patient was subsequently weaned off vasopressor support on 8/5/2018 and successfully extubated on 8/6/2018.    24 HOUR EVENTS:  Discussion held with  about a trach for patient.   At this time he does not wish to trach her and would like to try to extubate her again.  She spiked a fever to 101, repeat blood cultures and UA sent.  UA positive so U cx was sent as well.     SUBJECTIVE/ROS:  [ ] A ten-point review of systems was otherwise negative except as noted.  [x ] Due to altered mental status/intubation, subjective information were not able to be obtained from the patient. History was obtained, to the extent possible, from review of the chart and collateral sources of information.      NEURO  RASS:  -1   GCS:     CAM ICU:  Exam: sedated  Meds: clonazePAM Tablet 2 milliGRAM(s) Oral at bedtime  HYDROmorphone  Injectable 0.5 milliGRAM(s) IV Push every 4 hours PRN Severe Breakthrough Pain  melatonin 3 milliGRAM(s) Oral <User Schedule>  mirtazapine 45 milliGRAM(s) Oral <User Schedule>  oxyCODONE    Solution 5 milliGRAM(s) Enteral Tube every 4 hours PRN Severe Pain (7 - 10)  propofol Infusion 10 MICROgram(s)/kG/Min IV Continuous <Continuous>  QUEtiapine 50 milliGRAM(s) Oral <User Schedule>  sertraline 50 milliGRAM(s) Oral daily    [x] Adequacy of sedation and pain control has been assessed and adjusted      RESPIRATORY  RR: 30 (08-12-18 @ 04:00) (22 - 45)  SpO2: 100% (08-12-18 @ 04:40) (99% - 100%)  Wt(kg): --  Exam: unlabored, clear to auscultation bilaterally  Mechanical Ventilation: Mode: AC/ CMV (Assist Control/ Continuous Mandatory Ventilation), RR (machine): 14, RR (patient): 25, TV (machine): 400, FiO2: 40, PEEP: 5, ITime: 1, MAP: 13, PIP: 22  ABG - ( 11 Aug 2018 02:48 )  pH: 7.52  /  pCO2: 32    /  pO2: 168   / HCO3: 26    / Base Excess: 3.7   /  SaO2: 100     Lactate: x      [x] Extubation Readiness Assessed  Meds: ALBUTerol/ipratropium for Nebulization 3 milliLiter(s) Nebulizer every 6 hours  buDESOnide   0.5 milliGRAM(s) Respule 0.5 milliGRAM(s) Inhalation every 12 hours        CARDIOVASCULAR  HR: 94 (08-12-18 @ 04:40) (90 - 114)  BP: 100/59 (08-12-18 @ 04:00) (85/54 - 130/61)  BP(mean): 76 (08-12-18 @ 04:00) (64 - 88)  ABP: --  ABP(mean): --  Wt(kg): --  CVP(cm H2O): --      Exam: regular rate and rhythm  Cardiac Rhythm: sinus  Perfusion     [x]Adequate   [ ]Inadequate  Mentation   []Normal       [x]Reduced  Extremities  [x]Warm         [ ]Cool  Volume Status [ ]Hypervolemic [x]Euvolemic [ ]Hypovolemic  Meds:       GI/NUTRITION  Exam: soft, nontender, nondistended, incision C/D/I  Diet: TF @ 45  Meds: famotidine    Tablet 20 milliGRAM(s) Oral daily      GENITOURINARY  I&O's Detail    08-10 @ 07:01  -  08-11 @ 07:00  --------------------------------------------------------  IN:    Enteral Tube Flush: 100 mL    IV PiggyBack: 400 mL    Pivot: 495 mL    propofol Infusion: 140 mL    Solution: 50 mL    Solution: 350 mL  Total IN: 1535 mL    OUT:    Accordian: 45 mL    Indwelling Catheter - Urethral: 1530 mL  Total OUT: 1575 mL    Total NET: -40 mL      08-11 @ 07:01  -  08-12 @ 04:46  --------------------------------------------------------  IN:    Enteral Tube Flush: 130 mL    IV PiggyBack: 200 mL    Pivot: 945 mL    propofol Infusion: 240.9 mL  Total IN: 1515.9 mL    OUT:    Accordian: 50 mL    Indwelling Catheter - Urethral: 585 mL  Total OUT: 635 mL    Total NET: 880.9 mL          08-12    132<L>  |  104  |  37<H>  ----------------------------<  195<H>  4.7   |  22  |  1.08    Ca    7.2<L>      12 Aug 2018 02:21  Phos  2.9     08-12  Mg     1.9     08-12    TPro  4.2<L>  /  Alb  1.5<L>  /  TBili  0.6  /  DBili  0.3<H>  /  AST  33  /  ALT  29  /  AlkPhos  125<H>  08-11    [x ] Blackman catheter, indication: monitoring in the critically ill       HEMATOLOGIC  Meds: enoxaparin Injectable 40 milliGRAM(s) SubCutaneous daily    [x] VTE Prophylaxis                        8.5    7.8   )-----------( 300      ( 12 Aug 2018 02:21 )             26.8       Transfusion     [ ] PRBC   [ ] Platelets   [ ] FFP   [ ] Cryoprecipitate      INFECTIOUS DISEASES  WBC Count: 7.8 K/uL (08-12 @ 02:21)    RECENT CULTURES:  Specimen Source: .Urine Catheterized  Date/Time: 08-09 @ 21:17  Culture Results:   50,000 - 99,000 CFU/mL Presumptive Candida albicans  Gram Stain: --  Organism: --  Specimen Source: .Blood Blood  Date/Time: 08-09 @ 20:07  Culture Results:   No growth to date.  Gram Stain: --  Organism: --  Specimen Source: .Blood Blood  Date/Time: 08-09 @ 17:44  Culture Results:   No growth to date.  Gram Stain: --  Organism: --  Specimen Source: .Blood Blood  Date/Time: 08-08 @ 08:49  Culture Results:   No growth to date.  Gram Stain: --  Organism: --  Specimen Source: .Blood Blood-Peripheral  Date/Time: 08-07 @ 09:00  Culture Results:   No growth to date.  Gram Stain: --  Organism: --    Meds: meropenem  IVPB 1000 milliGRAM(s) IV Intermittent every 8 hours  vancomycin  IVPB 750 milliGRAM(s) IV Intermittent every 12 hours        ENDOCRINE  CAPILLARY BLOOD GLUCOSE      POCT Blood Glucose.: 163 mg/dL (11 Aug 2018 21:47)  POCT Blood Glucose.: 172 mg/dL (11 Aug 2018 17:54)  POCT Blood Glucose.: 120 mg/dL (11 Aug 2018 13:37)  POCT Blood Glucose.: 95 mg/dL (11 Aug 2018 10:30)  POCT Blood Glucose.: 111 mg/dL (11 Aug 2018 05:43)    Meds: insulin lispro (HumaLOG) corrective regimen sliding scale   SubCutaneous every 4 hours  predniSONE   Tablet 10 milliGRAM(s) Oral every 24 hours        ACCESS DEVICES:  [ ] Peripheral IV  [x ] Central Venous Line	[x ] R	[ ] L	[x ] IJ	[ ] Fem	[ ] SC	Placed:   [ ] Arterial Line		[ ] R	[ ] L	[ ] Fem	[ ] Rad	[ ] Ax	Placed:   [ ] PICC:					[ ] Mediport  [ ] Urinary Catheter, Date Placed:   [x] Necessity of urinary, arterial, and venous catheters discussed    OTHER MEDICATIONS:  chlorhexidine 0.12% Liquid 15 milliLiter(s) Swish and Spit <User Schedule>  chlorhexidine 4% Liquid 1 Application(s) Topical <User Schedule>  FIRST- Mouthwash  BLM 5 milliLiter(s) Swish and Spit daily  lidocaine   Patch 1 Patch Transdermal every 24 hours      CODE STATUS: full code

## 2018-08-12 NOTE — PROGRESS NOTE ADULT - SUBJECTIVE AND OBJECTIVE BOX
CC: f/u for mrsa hip prosthetic infection and bacteremia and pneumonia    Patient reports nothing sedate on vent    REVIEW OF SYSTEMS:  All other review of systems negative (Comprehensive ROS) cannot get    Antimicrobials Day #  :  meropenem  IVPB 1000 milliGRAM(s) IV Intermittent every 8 hours   Day 3/7  vancomycin  IVPB 750 milliGRAM(s) IV Intermittent every 12 hours   Day     Other Medications Reviewed    T(F): 99.6 (18 @ 11:00), Max: 101.1 (18 @ 19:00)  HR: 97 (18 @ 13:00)  BP: 109/55 (18 @ 13:00)  RR: 28 (18 @ 13:00)  SpO2: 100% (18 @ 13:00)  Wt(kg): --    PHYSICAL EXAM:  General: calm, ett, ngt  no acute distress  Eyes:  anicteric, no conjunctival injection, no discharge  Oropharynx: no lesions or injection 	  Neck: supple, without adenopathy  Lungs: course bs  to auscultation  Heart: regular rate and rhythm; no murmur, rubs or gallops  Abdomen: soft, nondistended, nontender, without mass or organomegaly  Skin: no lesions  Extremities: no clubbing, cyanosis, or. right hip wound clean  Neurologic: sedate on vent    LAB RESULTS:                        8.5    7.8   )-----------( 300      ( 12 Aug 2018 02:21 )             26.8     08-12    132<L>  |  104  |  37<H>  ----------------------------<  195<H>  4.7   |  22  |  1.08    Ca    7.2<L>      12 Aug 2018 02:21  Phos  2.9     -12  Mg     1.9     -12    TPro  4.2<L>  /  Alb  1.5<L>  /  TBili  0.6  /  DBili  0.3<H>  /  AST  33  /  ALT  29  /  AlkPhos  125<H>      LIVER FUNCTIONS - ( 11 Aug 2018 02:54 )  Alb: 1.5 g/dL / Pro: 4.2 g/dL / ALK PHOS: 125 U/L / ALT: 29 U/L / AST: 33 U/L / GGT: x           Urinalysis Basic - ( 11 Aug 2018 21:06 )    Color: Yellow / Appearance: SL Turbid / S.022 / pH: x  Gluc: x / Ketone: Negative  / Bili: Negative / Urobili: Negative   Blood: x / Protein: 150 mg/dL / Nitrite: Negative   Leuk Esterase: Large / RBC: 2-5 /HPF / WBC >50 /HPF   Sq Epi: x / Non Sq Epi: x / Bacteria: Few /HPF      MICROBIOLOGY:  RECENT CULTURES:   @ 21:17 .Urine Catheterized     50,000 - 99,000 CFU/mL Presumptive Candida albicans       @ 20:07 .Blood Blood     No growth to date.       @ 17:44 .Blood Blood     No growth to date.       @ 08:49 .Blood Blood     No growth to date.          RADIOLOGY REVIEWED:  < from: Xray Chest 1 View- PORTABLE-Routine (18 @ 07:39) >    IMPRESSION:   Persistent, trace bilateral pleural effusions.  No bilateral focal infiltrates.  Lines and tubes as above.     end of copied text >    Assessment:  Patient with mrsa hip prosthesis septic arthritis and sustained bacteremia until leisa/spacer now with respiratory failure and probable pneumonia much improved on vent and meropenem   Plan:  complete 5 more weeks of vancomycin  complete 4 more days of meropenem  supportive care per sicu

## 2018-08-12 NOTE — PROGRESS NOTE ADULT - ATTENDING COMMENTS
I saw and evaluated patient and agree with above note    extremely weak and deconditioned.  overall the delirium is clearing.   acute respiratory failure after major orthopedic surgeries.  the gas exchange is acceptable however patient does not tolerate breathing trial  Other organ systems have stabilized including renal function.  hemodynamics are acceptable  enteral nutrition provided.  I reviewed plan of care with patients  including possibility that tracheostomy may be needed  35 minutes of critical care management applied.

## 2018-08-12 NOTE — PROGRESS NOTE ADULT - SUBJECTIVE AND OBJECTIVE BOX
---___---___---___---___---___---___ ---___---___---___---___---___---___---___---___---___---                    <<<  M E D I C A L   A T T E N D I N G    F O L L O W    U P   N O T E  >>>    patient in sicu has history of bacteremia and mrsa sepsis   ---___---___---___---___---___---      <<<  MEDICATIONS:  >>>    MEDICATIONS  (STANDING):  ALBUTerol/ipratropium for Nebulization 3 milliLiter(s) Nebulizer every 6 hours  buDESOnide   0.5 milliGRAM(s) Respule 0.5 milliGRAM(s) Inhalation every 12 hours  chlorhexidine 0.12% Liquid 15 milliLiter(s) Swish and Spit <User Schedule>  chlorhexidine 4% Liquid 1 Application(s) Topical <User Schedule>  clonazePAM Tablet 2 milliGRAM(s) Oral at bedtime  enoxaparin Injectable 40 milliGRAM(s) SubCutaneous daily  famotidine    Tablet 20 milliGRAM(s) Oral daily  FIRST- Mouthwash  BLM 5 milliLiter(s) Swish and Spit daily  insulin lispro (HumaLOG) corrective regimen sliding scale   SubCutaneous every 4 hours  lidocaine   Patch 1 Patch Transdermal every 24 hours  melatonin 3 milliGRAM(s) Oral <User Schedule>  meropenem  IVPB 1000 milliGRAM(s) IV Intermittent every 8 hours  mirtazapine 45 milliGRAM(s) Oral <User Schedule>  predniSONE   Tablet 10 milliGRAM(s) Oral every 24 hours  propofol Infusion 10 MICROgram(s)/kG/Min (3.024 mL/Hr) IV Continuous <Continuous>  QUEtiapine 50 milliGRAM(s) Oral <User Schedule>  sertraline 50 milliGRAM(s) Oral daily  vancomycin  IVPB 750 milliGRAM(s) IV Intermittent every 12 hours      MEDICATIONS  (PRN):  HYDROmorphone  Injectable 0.5 milliGRAM(s) IV Push every 4 hours PRN Severe Breakthrough Pain  oxyCODONE    Solution 5 milliGRAM(s) Enteral Tube every 4 hours PRN Severe Pain (7 - 10)       ---___---___---___---___---___---     <<<REVIEW OF SYSTEM: >>>    GEN: no fever, no chills, no pain  RESP: no SOB, no cough, no sputum  CVS: no chest pain, no palpitations, no edema  GI: no abdominal pain, no nausea, no vomiting, no constipation, no diarrhea  : no dysurea, no frequency  NEURO: no headache, no dizziness  PSYCH: no depression, not anxious  Derm : no itching, no rash     ---___---___---___---___---___---          <<<  VITAL SIGNS: >>>    T(F): 99.6 (18 @ 11:00), Max: 101.1 (18 @ 19:00)  HR: 99 (18 @ 15:00) (90 - 114)  BP: 104/53 (18 @ 15:00) (85/54 - 126/58)  RR: 15 (18 @ 15:00) (15 - 45)  SpO2: 100% (18 @ 15:00) (100% - 100%)  Wt(kg): --  CAPILLARY BLOOD GLUCOSE      POCT Blood Glucose.: 70 mg/dL (12 Aug 2018 13:53)    I&O's Summary    11 Aug 2018 07:  -  12 Aug 2018 07:00  --------------------------------------------------------  IN: 1846.2 mL / OUT: 730 mL / NET: 1116.2 mL    12 Aug 2018 07:  -  12 Aug 2018 16:01  --------------------------------------------------------  IN: 536.9 mL / OUT: 235 mL / NET: 301.9 mL         ---___---___---___---___---___---                       PHYSICAL EXAM:    GEN: A&O X 2 , NAD , comfortable  HEENT: NCAT, PERRL, MMM, no scleral icterus, hearing intact  NECK: Supple, No JVD  CVS: S1S2 , regular , No M/R/G appreciated  PULM: CTA B/L,  no W/R/R appreciated  ABD.: soft. non tender, non distended,  bowel sounds present  Extrem: intact pulses ,  edema noted to right leg   Derm: No rash or ecchymosis noted        ---___---___---___---___---___---     <<<  LAB AND IMAGING: >>>                          8.5    7.8   )-----------( 300      ( 12 Aug 2018 02:21 )             26.8               08-12    135  |  101  |  40<H>  ----------------------------<  169<H>  4.6   |  22  |  1.06    Ca    7.3<L>      12 Aug 2018 15:01  Phos  2.9       Mg     1.9     12    TPro  4.2<L>  /  Alb  1.5<L>  /  TBili  0.6  /  DBili  0.3<H>  /  AST  33  /  ALT  29  /  AlkPhos  125<H>  0811           Urinalysis Basic - ( 11 Aug 2018 21:06 )    Color: Yellow / Appearance: SL Turbid / S.022 / pH: x  Gluc: x / Ketone: Negative  / Bili: Negative / Urobili: Negative   Blood: x / Protein: 150 mg/dL / Nitrite: Negative   Leuk Esterase: Large / RBC: 2-5 /HPF / WBC >50 /HPF   Sq Epi: x / Non Sq Epi: x / Bacteria: Few /HPF      CARDIAC MARKERS ( 12 Aug 2018 02:21 )  x     / x     / 73 U/L / x     / x      CARDIAC MARKERS ( 11 Aug 2018 02:54 )  x     / x     / 267 U/L / x     / x                ABG - ( 11 Aug 2018 02:48 )  pH, Arterial: 7.52  pH, Blood: x     /  pCO2: 32    /  pO2: 168   / HCO3: 26    / Base Excess: 3.7   /  SaO2: 100                     [All pertinent / recent available Imaging reports and other labs reviewed]     ---___---___---___---___---___---___ ---___---___---___---___---           <<<  A S S E S S M E N T   A N D   P L A N :  >>>          -GI/DVT Prophylaxis.       >>______________________<<      Deniz Bolden .         phone   [phone]

## 2018-08-12 NOTE — PROGRESS NOTE ADULT - ASSESSMENT
ASSESSMENT:  68 year old female presenting with septic shock from MRSA bacteremia secondary to infected right hemiarthroplasty hardware s/p right hip I&D, explantation of right hemiarthroplasty, placement of antibiotic spacer, and right femur ORIF.    PLAN:    Neuro: acute post-op pain, anxiety, depression, dementia, intubated  - Monitor mental status.  - Home regimen of Klonopin, Zoloft, Seroquel, Remeron, and melatonin for agitation related to her dementia.  - Pain control with Tylenol, oxycodone, and Dilaudid.  - Sedation with propofol while intubated.    Resp: acute respiratory distress, asthma, possible PNA  - Monitor pulse oximeter and ABGs.  - Mechanical ventilation for acute respiratory failure. Patient has been reintubated 3 times this hospitalization so will need tracheostomy.  - Home prednisone 10 mg daily, Pulmicort, and Duoneb for asthma.    CV: distributive shock (resolved)  - Monitor vital signs.    GI: no acute issues  - NPO with Pivot at goal of 45 mL/hr.  - Famotidine for GERD.  - Bowel regimen with Colace & senna.    Renal: CKD stage 2, hyponatremia  - Monitor I&Os.  - Monitor electrolytes and replete as necessary.  - Assess need for diuresis.    Heme: no acute issues  - Lovenox for VTE prophylaxis.    ID: MRSA bacteremia, infected right hip s/p I&D, explantation of hardware, & placement of antibiotic spacer, UTI, concern for PNA, now febrile again  - Monitor WBC, temperature, and procalcitonin.  - Follow up urine and blood cultures. Consider Combicath. Blood cultures from 8/6/2018 with no growth of MRSA.  - Vancomycin and meropenem for MRSA bacteremia and possible PNA. Appreciate ID recommendations.  - Awaiting PICC placement for long-term IV antibiotics.    Endo: hyperglycemia  - Monitor fingersticks q4hrs for hypoglycemia.    Disposition:  - Full code.  - Will remain in SICU for respiratory monitoring.    DEE Centeno PGY2  [id number]

## 2018-08-12 NOTE — PROGRESS NOTE ADULT - SUBJECTIVE AND OBJECTIVE BOX
Ortho Progress Note    S: Patient seen and examined. No acute events overnight. Pain well controlled with current regimen. Intubated and Sedated.      O:  Physical Exam:  Gen: Laying in bed,   Resp: intubated  Ext: aquacell c/d/i      Vital Signs Last 24 Hrs  T(C): 37.3 (12 Aug 2018 04:00), Max: 38.4 (11 Aug 2018 19:00)  T(F): 99.1 (12 Aug 2018 04:00), Max: 101.1 (11 Aug 2018 19:00)  HR: 99 (12 Aug 2018 07:00) (90 - 114)  BP: 122/62 (12 Aug 2018 07:00) (85/54 - 130/61)  BP(mean): 81 (12 Aug 2018 07:00) (64 - 88)  RR: 35 (12 Aug 2018 07:00) (22 - 45)  SpO2: 100% (12 Aug 2018 07:00) (99% - 100%)                          8.5    7.8   )-----------( 300      ( 12 Aug 2018 02:21 )             26.8                         8.4    9.8   )-----------( 286      ( 11 Aug 2018 02:54 )             26.1       08-12    132<L>  |  104  |  37<H>  ----------------------------<  195<H>  4.7   |  22  |  1.08

## 2018-08-12 NOTE — PROGRESS NOTE ADULT - ASSESSMENT
68F s/p R hip PJI explant and placement of cement spacer w/ ORIF distal femur    Pain control  DVT ppx lovenox  FU blood cultures  Cont abx per ID  NWB LLE  FFWB RLE  PT/OT  wean to extubate vs trach

## 2018-08-13 ENCOUNTER — APPOINTMENT (OUTPATIENT)
Dept: ORTHOPEDIC SURGERY | Facility: CLINIC | Age: 68
End: 2018-08-13

## 2018-08-13 LAB
ALBUMIN SERPL ELPH-MCNC: 1.5 G/DL — LOW (ref 3.3–5)
ALP SERPL-CCNC: 136 U/L — HIGH (ref 40–120)
ALT FLD-CCNC: 22 U/L — SIGNIFICANT CHANGE UP (ref 10–45)
ANION GAP SERPL CALC-SCNC: 11 MMOL/L — SIGNIFICANT CHANGE UP (ref 5–17)
AST SERPL-CCNC: 17 U/L — SIGNIFICANT CHANGE UP (ref 10–40)
BILIRUB DIRECT SERPL-MCNC: <0.1 MG/DL — SIGNIFICANT CHANGE UP (ref 0–0.2)
BILIRUB INDIRECT FLD-MCNC: >0.1 MG/DL — LOW (ref 0.2–1)
BILIRUB SERPL-MCNC: 0.2 MG/DL — SIGNIFICANT CHANGE UP (ref 0.2–1.2)
BLD GP AB SCN SERPL QL: NEGATIVE — SIGNIFICANT CHANGE UP
BUN SERPL-MCNC: 46 MG/DL — HIGH (ref 7–23)
C PEPTIDE SERPL-MCNC: 7.8 NG/ML — HIGH (ref 0.9–7.1)
CALCIUM SERPL-MCNC: 7.3 MG/DL — LOW (ref 8.4–10.5)
CHLORIDE SERPL-SCNC: 105 MMOL/L — SIGNIFICANT CHANGE UP (ref 96–108)
CO2 SERPL-SCNC: 22 MMOL/L — SIGNIFICANT CHANGE UP (ref 22–31)
CREAT SERPL-MCNC: 1.16 MG/DL — SIGNIFICANT CHANGE UP (ref 0.5–1.3)
CULTURE RESULTS: SIGNIFICANT CHANGE UP
CULTURE RESULTS: SIGNIFICANT CHANGE UP
GAS PNL BLDA: SIGNIFICANT CHANGE UP
GLUCOSE SERPL-MCNC: 111 MG/DL — HIGH (ref 70–99)
HCT VFR BLD CALC: 23.9 % — LOW (ref 34.5–45)
HCT VFR BLD CALC: 24 % — LOW (ref 34.5–45)
HGB BLD-MCNC: 7.6 G/DL — LOW (ref 11.5–15.5)
HGB BLD-MCNC: 7.7 G/DL — LOW (ref 11.5–15.5)
MAGNESIUM SERPL-MCNC: 1.9 MG/DL — SIGNIFICANT CHANGE UP (ref 1.6–2.6)
MCHC RBC-ENTMCNC: 29.1 PG — SIGNIFICANT CHANGE UP (ref 27–34)
MCHC RBC-ENTMCNC: 29.6 PG — SIGNIFICANT CHANGE UP (ref 27–34)
MCHC RBC-ENTMCNC: 31.5 GM/DL — LOW (ref 32–36)
MCHC RBC-ENTMCNC: 32.1 GM/DL — SIGNIFICANT CHANGE UP (ref 32–36)
MCV RBC AUTO: 92.2 FL — SIGNIFICANT CHANGE UP (ref 80–100)
MCV RBC AUTO: 92.5 FL — SIGNIFICANT CHANGE UP (ref 80–100)
PHOSPHATE SERPL-MCNC: 2.7 MG/DL — SIGNIFICANT CHANGE UP (ref 2.5–4.5)
PLATELET # BLD AUTO: 287 K/UL — SIGNIFICANT CHANGE UP (ref 150–400)
PLATELET # BLD AUTO: 289 K/UL — SIGNIFICANT CHANGE UP (ref 150–400)
POTASSIUM SERPL-MCNC: 4.7 MMOL/L — SIGNIFICANT CHANGE UP (ref 3.5–5.3)
POTASSIUM SERPL-SCNC: 4.7 MMOL/L — SIGNIFICANT CHANGE UP (ref 3.5–5.3)
PROT SERPL-MCNC: 4.5 G/DL — LOW (ref 6–8.3)
RBC # BLD: 2.59 M/UL — LOW (ref 3.8–5.2)
RBC # BLD: 2.6 M/UL — LOW (ref 3.8–5.2)
RBC # FLD: 14.7 % — HIGH (ref 10.3–14.5)
RBC # FLD: 14.9 % — HIGH (ref 10.3–14.5)
RH IG SCN BLD-IMP: NEGATIVE — SIGNIFICANT CHANGE UP
SODIUM SERPL-SCNC: 138 MMOL/L — SIGNIFICANT CHANGE UP (ref 135–145)
SPECIMEN SOURCE: SIGNIFICANT CHANGE UP
SPECIMEN SOURCE: SIGNIFICANT CHANGE UP
WBC # BLD: 6.7 K/UL — SIGNIFICANT CHANGE UP (ref 3.8–10.5)
WBC # BLD: 8 K/UL — SIGNIFICANT CHANGE UP (ref 3.8–10.5)
WBC # FLD AUTO: 6.7 K/UL — SIGNIFICANT CHANGE UP (ref 3.8–10.5)
WBC # FLD AUTO: 8 K/UL — SIGNIFICANT CHANGE UP (ref 3.8–10.5)

## 2018-08-13 PROCEDURE — 71045 X-RAY EXAM CHEST 1 VIEW: CPT | Mod: 26

## 2018-08-13 PROCEDURE — 99291 CRITICAL CARE FIRST HOUR: CPT

## 2018-08-13 RX ORDER — FLUCONAZOLE 150 MG/1
200 TABLET ORAL ONCE
Qty: 0 | Refills: 0 | Status: COMPLETED | OUTPATIENT
Start: 2018-08-13 | End: 2018-08-13

## 2018-08-13 RX ORDER — FLUCONAZOLE 150 MG/1
100 TABLET ORAL EVERY 24 HOURS
Qty: 0 | Refills: 0 | Status: DISCONTINUED | OUTPATIENT
Start: 2018-08-14 | End: 2018-08-16

## 2018-08-13 RX ORDER — CALCIUM GLUCONATE 100 MG/ML
2 VIAL (ML) INTRAVENOUS ONCE
Qty: 0 | Refills: 0 | Status: COMPLETED | OUTPATIENT
Start: 2018-08-13 | End: 2018-08-13

## 2018-08-13 RX ORDER — MAGNESIUM SULFATE 500 MG/ML
2 VIAL (ML) INJECTION ONCE
Qty: 0 | Refills: 0 | Status: COMPLETED | OUTPATIENT
Start: 2018-08-13 | End: 2018-08-13

## 2018-08-13 RX ADMIN — OXYCODONE HYDROCHLORIDE 5 MILLIGRAM(S): 5 TABLET ORAL at 18:40

## 2018-08-13 RX ADMIN — Medication 3 MILLILITER(S): at 23:59

## 2018-08-13 RX ADMIN — QUETIAPINE FUMARATE 50 MILLIGRAM(S): 200 TABLET, FILM COATED ORAL at 21:32

## 2018-08-13 RX ADMIN — Medication 50 GRAM(S): at 04:09

## 2018-08-13 RX ADMIN — CHLORHEXIDINE GLUCONATE 1 APPLICATION(S): 213 SOLUTION TOPICAL at 06:10

## 2018-08-13 RX ADMIN — Medication 10 MILLIGRAM(S): at 13:46

## 2018-08-13 RX ADMIN — Medication 250 MILLIGRAM(S): at 07:12

## 2018-08-13 RX ADMIN — Medication 1: at 21:45

## 2018-08-13 RX ADMIN — SERTRALINE 50 MILLIGRAM(S): 25 TABLET, FILM COATED ORAL at 13:46

## 2018-08-13 RX ADMIN — Medication 2 MILLIGRAM(S): at 21:29

## 2018-08-13 RX ADMIN — Medication 1: at 18:41

## 2018-08-13 RX ADMIN — DIPHENHYDRAMINE HYDROCHLORIDE AND LIDOCAINE HYDROCHLORIDE AND ALUMINUM HYDROXIDE AND MAGNESIUM HYDRO 5 MILLILITER(S): KIT at 13:47

## 2018-08-13 RX ADMIN — FAMOTIDINE 20 MILLIGRAM(S): 10 INJECTION INTRAVENOUS at 13:47

## 2018-08-13 RX ADMIN — Medication 125 MILLIMOLE(S): at 04:08

## 2018-08-13 RX ADMIN — PROPOFOL 3.02 MICROGRAM(S)/KG/MIN: 10 INJECTION, EMULSION INTRAVENOUS at 00:49

## 2018-08-13 RX ADMIN — Medication 3 MILLILITER(S): at 17:24

## 2018-08-13 RX ADMIN — Medication 0.5 MILLIGRAM(S): at 17:24

## 2018-08-13 RX ADMIN — Medication 3 MILLIGRAM(S): at 21:31

## 2018-08-13 RX ADMIN — MIRTAZAPINE 45 MILLIGRAM(S): 45 TABLET, ORALLY DISINTEGRATING ORAL at 21:31

## 2018-08-13 RX ADMIN — Medication 200 GRAM(S): at 06:30

## 2018-08-13 RX ADMIN — FLUCONAZOLE 100 MILLIGRAM(S): 150 TABLET ORAL at 09:25

## 2018-08-13 RX ADMIN — ENOXAPARIN SODIUM 40 MILLIGRAM(S): 100 INJECTION SUBCUTANEOUS at 13:47

## 2018-08-13 RX ADMIN — Medication 1: at 09:34

## 2018-08-13 RX ADMIN — OXYCODONE HYDROCHLORIDE 5 MILLIGRAM(S): 5 TABLET ORAL at 19:10

## 2018-08-13 RX ADMIN — OXYCODONE HYDROCHLORIDE 5 MILLIGRAM(S): 5 TABLET ORAL at 01:00

## 2018-08-13 RX ADMIN — CHLORHEXIDINE GLUCONATE 15 MILLILITER(S): 213 SOLUTION TOPICAL at 13:47

## 2018-08-13 RX ADMIN — MEROPENEM 100 MILLIGRAM(S): 1 INJECTION INTRAVENOUS at 06:10

## 2018-08-13 RX ADMIN — CHLORHEXIDINE GLUCONATE 15 MILLILITER(S): 213 SOLUTION TOPICAL at 06:10

## 2018-08-13 RX ADMIN — CHLORHEXIDINE GLUCONATE 15 MILLILITER(S): 213 SOLUTION TOPICAL at 21:29

## 2018-08-13 RX ADMIN — LIDOCAINE 1 PATCH: 4 CREAM TOPICAL at 09:25

## 2018-08-13 RX ADMIN — Medication 0.5 MILLIGRAM(S): at 05:14

## 2018-08-13 RX ADMIN — OXYCODONE HYDROCHLORIDE 5 MILLIGRAM(S): 5 TABLET ORAL at 00:45

## 2018-08-13 RX ADMIN — MEROPENEM 100 MILLIGRAM(S): 1 INJECTION INTRAVENOUS at 22:12

## 2018-08-13 RX ADMIN — MEROPENEM 100 MILLIGRAM(S): 1 INJECTION INTRAVENOUS at 14:08

## 2018-08-13 RX ADMIN — LIDOCAINE 1 PATCH: 4 CREAM TOPICAL at 21:16

## 2018-08-13 RX ADMIN — Medication 3 MILLILITER(S): at 11:46

## 2018-08-13 RX ADMIN — Medication 250 MILLIGRAM(S): at 18:41

## 2018-08-13 RX ADMIN — Medication 3 MILLILITER(S): at 05:14

## 2018-08-13 NOTE — PROGRESS NOTE ADULT - ASSESSMENT
sepsis   repiratory failure     early onset alheimers    respiratory failure . still intuibated if failing weaning protocol will likely need tracheostomy

## 2018-08-13 NOTE — PROGRESS NOTE ADULT - SUBJECTIVE AND OBJECTIVE BOX
HISTORY  68 year old female with a past medical history of asthma on steroids, CVA (no residual deficits), pulmonary HTN, HLD, GERD, ulcerative colitis, fatty pancreas, impaired glucose tolerance, anxiety, and depression who presented on 7/26/2018 with altered mental status, rigors, and urinary frequency. Of note, patient was recently hospitalized for a right femoral neck fracture secondary to osteoporosis s/p hemiarthroplasty on 6/22/2018. She had been taking Plavix for her history of CVA and Lovenox for VTE prophylaxis but she developed a hematoma at her surgical site so the Lovenox was stopped. Since then, patient has been progressively more agitated, confused, and weak. Additionally, patient has been complaining of new onset low back pain and left hip pain. In the ED, patient was hemodynamically stable but noted to be hypoglycemic. She was also started on meropenem & vancomycin for concern of sepsis. CT scan revealed a 17 cm collection adjacent to the right hip prosthesis, an acute L2 compression fracture, and new left acetabular fracture. Patient was admitted to medicine but on 7/27/2018, patient was noted to be more altered, rigorous, tachypneic, febrile to 101 F, and hypotensive w/ SBP in the 60s so an RRT was called. She was more fluid and started on a norepinephrine gtt. Patient subsequently admitted to SICU for hemodynamic monitoring. Blood cultures positive for MRSA. Decision was made to take the patient to the OR on 7/27/2018 for right hip I&D and exchange of the femoral head. She returned to the SICU intubated on vasopressor support. She was extubated on 7/30/2018 but was reintubated on 8/2/2018. Additionally, patient was persistently on vasopressor support and bacteremic with MRSA. Patient was subsequently taken back to the OR on 8/4/2018 for further right hip I&D, explantation of right hemiarthroplasty, placement of antibiotic spacer, and right femur ORIF. Patient was subsequently weaned off vasopressor support on 8/5/2018 and successfully extubated on 8/6/2018. HISTORY  68 year old female with a past medical history of asthma on steroids, CVA (no residual deficits), pulmonary HTN, HLD, GERD, ulcerative colitis, fatty pancreas, impaired glucose tolerance, anxiety, and depression who presented on 7/26/2018 with altered mental status, rigors, and urinary frequency. Of note, patient was recently hospitalized for a right femoral neck fracture secondary to osteoporosis s/p hemiarthroplasty on 6/22/2018. She had been taking Plavix for her history of CVA and Lovenox for VTE prophylaxis but she developed a hematoma at her surgical site so the Lovenox was stopped. Since then, patient has been progressively more agitated, confused, and weak. Additionally, patient has been complaining of new onset low back pain and left hip pain. In the ED, patient was hemodynamically stable but noted to be hypoglycemic. She was also started on meropenem & vancomycin for concern of sepsis. CT scan revealed a 17 cm collection adjacent to the right hip prosthesis, an acute L2 compression fracture, and new left acetabular fracture. Patient was admitted to medicine but on 7/27/2018, patient was noted to be more altered, rigorous, tachypneic, febrile to 101 F, and hypotensive w/ SBP in the 60s so an RRT was called. She was more fluid and started on a norepinephrine gtt. Patient subsequently admitted to SICU for hemodynamic monitoring. Blood cultures positive for MRSA. Decision was made to take the patient to the OR on 7/27/2018 for right hip I&D and exchange of the femoral head. She returned to the SICU intubated on vasopressor support. She was extubated on 7/30/2018 but was reintubated on 8/2/2018. Additionally, patient was persistently on vasopressor support and bacteremic with MRSA. Patient was subsequently taken back to the OR on 8/4/2018 for further right hip I&D, explantation of right hemiarthroplasty, placement of antibiotic spacer, and right femur ORIF. Patient was subsequently weaned off vasopressor support on 8/5/2018 and extubated on 8/6/2018 but required reintubation for tachypnea with accessory muscle use on 8/11/2018.    24 HOUR EVENTS:  - Glucose of 48 on fingerstick but 169 on BMP. Insulin sliding scale reduced from moderate to low.  - Was on max dose of propofol for agitation and subsequently became hypotensive. BP improved after propofol dose was reduced. HISTORY  68 year old female with a past medical history of asthma on steroids, CVA (no residual deficits), pulmonary HTN, HLD, GERD, ulcerative colitis, fatty pancreas, impaired glucose tolerance, anxiety, and depression who presented on 7/26/2018 with altered mental status, rigors, and urinary frequency. Of note, patient was recently hospitalized for a right femoral neck fracture secondary to osteoporosis s/p hemiarthroplasty on 6/22/2018. She had been taking Plavix for her history of CVA and Lovenox for VTE prophylaxis but she developed a hematoma at her surgical site so the Lovenox was stopped. Since then, patient has been progressively more agitated, confused, and weak. Additionally, patient has been complaining of new onset low back pain and left hip pain. In the ED, patient was hemodynamically stable but noted to be hypoglycemic. She was also started on meropenem & vancomycin for concern of sepsis. CT scan revealed a 17 cm collection adjacent to the right hip prosthesis, an acute L2 compression fracture, and new left acetabular fracture. Patient was admitted to medicine but on 7/27/2018, patient was noted to be more altered, rigorous, tachypneic, febrile to 101 F, and hypotensive w/ SBP in the 60s so an RRT was called. She was more fluid and started on a norepinephrine gtt. Patient subsequently admitted to SICU for hemodynamic monitoring. Blood cultures positive for MRSA. Decision was made to take the patient to the OR on 7/27/2018 for right hip I&D and exchange of the femoral head. She returned to the SICU intubated on vasopressor support. She was extubated on 7/30/2018 but was reintubated on 8/2/2018. Additionally, patient was persistently on vasopressor support and bacteremic with MRSA. Patient was subsequently taken back to the OR on 8/4/2018 for further right hip I&D, explantation of right hemiarthroplasty, placement of antibiotic spacer, and right femur ORIF. Patient was subsequently weaned off vasopressor support on 8/5/2018 and extubated on 8/6/2018 but required reintubation for tachypnea with accessory muscle use on 8/11/2018.    24 HOUR EVENTS:  - Glucose of 48 on fingerstick but 169 on BMP. Insulin sliding scale reduced from moderate to low.  - Was on max dose of propofol for agitation and subsequently became hypotensive. BP improved after propofol dose was reduced.    SUBJECTIVE/ROS:  [ ] A ten-point review of systems was otherwise negative except as noted.  [x] Due to altered mental status/intubation, subjective information were not able to be obtained from the patient. History was obtained, to the extent possible, from review of the chart and collateral sources of information.    NEURO  Exam: sedated, intermittently agitated, intermittently follows commands, moving all four extremities  Meds:  - clonazePAM Tablet 2 milliGRAM(s) Oral at bedtime  - HYDROmorphone  Injectable 0.5 milliGRAM(s) IV Push every 4 hours PRN Severe Breakthrough Pain  - melatonin 3 milliGRAM(s) Oral <User Schedule>  - mirtazapine 45 milliGRAM(s) Oral <User Schedule>  - oxyCODONE    Solution 5 milliGRAM(s) Enteral Tube every 4 hours PRN Severe Pain (7 - 10)  - propofol Infusion 10 MICROgram(s)/kG/Min IV Continuous <Continuous>  - QUEtiapine 50 milliGRAM(s) Oral <User Schedule>  - sertraline 50 milliGRAM(s) Oral daily  [x] Adequacy of sedation and pain control has been assessed and adjusted    RESPIRATORY  RR: 26 (08-13-18 @ 05:30) (13 - 51)  SpO2: 100% (08-13-18 @ 05:30) (100% - 100%)  Exam: clear to auscultation bilaterally  Mechanical Ventilation: Mode: AC/ CMV (Assist Control/ Continuous Mandatory Ventilation), RR (machine): 14, RR (patient): 26, TV (machine): 400, FiO2: 30, PEEP: 5, ITime: 1, MAP: 11, PIP: 22  [x] Extubation Readiness Assessed  ABG - ( 13 Aug 2018 03:01 )  pH: 7.48  /  pCO2: 34    /  pO2: 193   / HCO3: 25    / Base Excess: 1.6   /  SaO2: 100   /    Lactate: 1.0  Meds:  - ALBUTerol/ipratropium for Nebulization 3 milliLiter(s) Nebulizer every 6 hours  - buDESOnide   0.5 milliGRAM(s) Respule 0.5 milliGRAM(s) Inhalation every 12 hours    CARDIOVASCULAR  HR: 102 (08-13-18 @ 05:30) (84 - 125)  BP: 112/51 (08-13-18 @ 05:30) (84/49 - 126/58)  BP(mean): 74 (08-13-18 @ 05:30) (62 - 88)  Exam: regular rate and rhythm, S1S2  Cardiac Rhythm: sinus  Perfusion    [x]Adequate    [ ]Inadequate  Mentation   [ ]Normal       [x]Reduced (dementia at baseline)  Extremities  [x]Warm         [ ]Cool  Volume Status [x]Hypervolemic [ ]Euvolemic [ ]Hypovolemic  Meds: none    GI/NUTRITION  Exam: soft, nontender, nondistended  Diet: NPO with Pivot at 45 mL/hr via NG tube  Meds: famotidine    Tablet 20 milliGRAM(s) Oral daily    GENITOURINARY      138  |  105  |  46<H>  ----------------------------<  111<H>  4.7   |  22  |  1.16    Ca    7.3<L>      13 Aug 2018 03:06  Phos  2.7  Mg     1.9  TPro  4.5<L>  /  Alb  1.5<L>  /  TBili  0.2  /  DBili  <0.1  /  AST  17  /  ALT  22  /  AlkPhos  136<H>    [ ] Blackman catheter, indication:   Meds: calcium gluconate IVPB 2 Gram(s) IV Intermittent once        HEMATOLOGIC  Meds: enoxaparin Injectable 40 milliGRAM(s) SubCutaneous daily    [x] VTE Prophylaxis                        7.7    8.0   )-----------( 287      ( 13 Aug 2018 03:06 )             23.9       Transfusion     [ ] PRBC   [ ] Platelets   [ ] FFP   [ ] Cryoprecipitate      INFECTIOUS DISEASES  T(C): 37.6 (08-13-18 @ 05:00), Max: 38.4 (08-13-18 @ 00:00)  Wt(kg): --  WBC Count: 8.0 K/uL (08-13 @ 03:06)    Recent Cultures:  Specimen Source: .Blood Blood, 08-12 @ 00:16; Results   No growth to date.; Gram Stain: --; Organism: --  Specimen Source: .Urine Catheterized, 08-09 @ 21:17; Results   50,000 - 99,000 CFU/mL Presumptive Candida albicans; Gram Stain: --; Organism: --  Specimen Source: .Blood Blood, 08-09 @ 20:07; Results   No growth to date.; Gram Stain: --; Organism: --  Specimen Source: .Blood Blood, 08-09 @ 17:44; Results   No growth to date.; Gram Stain: --; Organism: --  Specimen Source: .Blood Blood, 08-08 @ 08:49; Results   No growth to date.; Gram Stain: --; Organism: --  Specimen Source: .Blood Blood-Peripheral, 08-07 @ 09:00; Results   No growth at 5 days.; Gram Stain: --; Organism: --  Specimen Source: .Blood Blood-Peripheral, 08-06 @ 15:50; Results   No growth at 5 days.; Gram Stain: --; Organism: --    Meds: meropenem  IVPB 1000 milliGRAM(s) IV Intermittent every 8 hours  vancomycin  IVPB 750 milliGRAM(s) IV Intermittent every 12 hours        ENDOCRINE  Capillary Blood Glucose    Meds: insulin lispro (HumaLOG) corrective regimen sliding scale   SubCutaneous every 4 hours  predniSONE   Tablet 10 milliGRAM(s) Oral every 24 hours        ACCESS DEVICES:  [ ] Peripheral IV  [ ] Central Venous Line	[ ] R	[ ] L	[ ] IJ	[ ] Fem	[ ] SC	Placed:   [ ] Arterial Line		[ ] R	[ ] L	[ ] Fem	[ ] Rad	[ ] Ax	Placed:   [ ] PICC:					[ ] Mediport  [ ] Urinary Catheter, Date Placed:   [ ] Necessity of urinary, arterial, and venous catheters discussed    OTHER MEDICATIONS:  chlorhexidine 0.12% Liquid 15 milliLiter(s) Swish and Spit <User Schedule>  chlorhexidine 4% Liquid 1 Application(s) Topical <User Schedule>  FIRST- Mouthwash  BLM 5 milliLiter(s) Swish and Spit daily  lidocaine   Patch 1 Patch Transdermal every 24 hours      CODE STATUS:     IMAGING: HISTORY  68 year old female with a past medical history of asthma on steroids, CVA (no residual deficits), pulmonary HTN, HLD, GERD, ulcerative colitis, fatty pancreas, impaired glucose tolerance, anxiety, and depression who presented on 7/26/2018 with altered mental status, rigors, and urinary frequency. Of note, patient was recently hospitalized for a right femoral neck fracture secondary to osteoporosis s/p hemiarthroplasty on 6/22/2018. She had been taking Plavix for her history of CVA and Lovenox for VTE prophylaxis but she developed a hematoma at her surgical site so the Lovenox was stopped. Since then, patient has been progressively more agitated, confused, and weak. Additionally, patient has been complaining of new onset low back pain and left hip pain. In the ED, patient was hemodynamically stable but noted to be hypoglycemic. She was also started on meropenem & vancomycin for concern of sepsis. CT scan revealed a 17 cm collection adjacent to the right hip prosthesis, an acute L2 compression fracture, and new left acetabular fracture. Patient was admitted to medicine but on 7/27/2018, patient was noted to be more altered, rigorous, tachypneic, febrile to 101 F, and hypotensive w/ SBP in the 60s so an RRT was called. She was more fluid and started on a norepinephrine gtt. Patient subsequently admitted to SICU for hemodynamic monitoring. Blood cultures positive for MRSA. Decision was made to take the patient to the OR on 7/27/2018 for right hip I&D and exchange of the femoral head. She returned to the SICU intubated on vasopressor support. She was extubated on 7/30/2018 but was reintubated on 8/2/2018. Additionally, patient was persistently on vasopressor support and bacteremic with MRSA. Patient was subsequently taken back to the OR on 8/4/2018 for further right hip I&D, explantation of right hemiarthroplasty, placement of antibiotic spacer, and right femur ORIF. Patient was subsequently weaned off vasopressor support on 8/5/2018 and extubated on 8/6/2018 but required reintubation for tachypnea with accessory muscle use on 8/11/2018.    24 HOUR EVENTS:  - Glucose of 48 on fingerstick but 169 on BMP. Insulin sliding scale reduced from moderate to low.  - Was on max dose of propofol for agitation and subsequently became hypotensive. BP improved after propofol dose was reduced.    SUBJECTIVE/ROS:  [ ] A ten-point review of systems was otherwise negative except as noted.  [x] Due to altered mental status/intubation, subjective information were not able to be obtained from the patient. History was obtained, to the extent possible, from review of the chart and collateral sources of information.    NEURO  Exam: sedated, intermittently agitated, intermittently follows commands, moving all four extremities  Meds:  - clonazePAM Tablet 2 milliGRAM(s) Oral at bedtime  - HYDROmorphone  Injectable 0.5 milliGRAM(s) IV Push every 4 hours PRN Severe Breakthrough Pain  - melatonin 3 milliGRAM(s) Oral <User Schedule>  - mirtazapine 45 milliGRAM(s) Oral <User Schedule>  - oxyCODONE    Solution 5 milliGRAM(s) Enteral Tube every 4 hours PRN Severe Pain (7 - 10)  - propofol Infusion 10 MICROgram(s)/kG/Min IV Continuous <Continuous>  - QUEtiapine 50 milliGRAM(s) Oral <User Schedule>  - sertraline 50 milliGRAM(s) Oral daily  - lidocaine   Patch 1 Patch Transdermal every 24 hours  [x] Adequacy of sedation and pain control has been assessed and adjusted    RESPIRATORY  RR: 26 (08-13-18 @ 05:30) (13 - 51)  SpO2: 100% (08-13-18 @ 05:30) (100% - 100%)  Exam: clear to auscultation bilaterally  Mechanical Ventilation: Mode: AC/ CMV (Assist Control/ Continuous Mandatory Ventilation), RR (machine): 14, RR (patient): 26, TV (machine): 400, FiO2: 30, PEEP: 5, ITime: 1, MAP: 11, PIP: 22  [x] Extubation Readiness Assessed  ABG - ( 13 Aug 2018 03:01 )  pH: 7.48  /  pCO2: 34    /  pO2: 193   / HCO3: 25    / Base Excess: 1.6   /  SaO2: 100   /    Lactate: 1.0  Meds:  - ALBUTerol/ipratropium for Nebulization 3 milliLiter(s) Nebulizer every 6 hours  - buDESOnide   0.5 milliGRAM(s) Respule 0.5 milliGRAM(s) Inhalation every 12 hours  - predniSONE   Tablet 10 milliGRAM(s) Oral every 24 hours    CARDIOVASCULAR  HR: 102 (08-13-18 @ 05:30) (84 - 125)  BP: 112/51 (08-13-18 @ 05:30) (84/49 - 126/58)  BP(mean): 74 (08-13-18 @ 05:30) (62 - 88)  Exam: regular rate and rhythm, S1S2  Cardiac Rhythm: sinus  Perfusion    [x]Adequate    [ ]Inadequate  Mentation   [ ]Normal       [x]Reduced (dementia at baseline)  Extremities  [x]Warm         [ ]Cool  Volume Status [x]Hypervolemic [ ]Euvolemic [ ]Hypovolemic  Meds: none    GI/NUTRITION  Exam: soft, nontender, nondistended  Diet: NPO with Pivot at 45 mL/hr via NG tube  Meds: famotidine    Tablet 20 milliGRAM(s) Oral daily    GENITOURINARY      138  |  105  |  46<H>  ----------------------------<  111<H>  4.7   |  22  |  1.16    Ca    7.3<L>      13 Aug 2018 03:06  Phos  2.7  Mg     1.9  TPro  4.5<L>  /  Alb  1.5<L>  /  TBili  0.2  /  DBili  <0.1  /  AST  17  /  ALT  22  /  AlkPhos  136<H>    [x] Blackman catheter, indication: urine output monitoring in the critically ill  Meds: none    HEMATOLOGIC  Meds: enoxaparin Injectable 40 milliGRAM(s) SubCutaneous daily  [x] VTE Prophylaxis                        7.7    8.0   )-----------( 287      ( 13 Aug 2018 03:06 )             23.9     INFECTIOUS DISEASES  T(C): 37.6 (08-13-18 @ 05:00), Max: 38.4 (08-13-18 @ 00:00)  WBC Count: 8.0 K/uL (08-13 @ 03:06)    Recent Cultures:  Specimen Source: .Blood Blood, 08-12 @ 00:16; Results   No growth to date.; Gram Stain: --; Organism: --  Specimen Source: .Urine Catheterized, 08-09 @ 21:17; Results   50,000 - 99,000 CFU/mL Presumptive Candida albicans; Gram Stain: --; Organism: --  Specimen Source: .Blood Blood, 08-09 @ 20:07; Results   No growth to date.; Gram Stain: --; Organism: --  Specimen Source: .Blood Blood, 08-09 @ 17:44; Results   No growth to date.; Gram Stain: --; Organism: --  Specimen Source: .Blood Blood, 08-08 @ 08:49; Results   No growth to date.; Gram Stain: --; Organism: --  Specimen Source: .Blood Blood-Peripheral, 08-07 @ 09:00; Results   No growth at 5 days.; Gram Stain: --; Organism: --  Specimen Source: .Blood Blood-Peripheral, 08-06 @ 15:50; Results   No growth at 5 days.; Gram Stain: --; Organism: --    Meds:  - meropenem  IVPB 1000 milliGRAM(s) IV Intermittent every 8 hours  - vancomycin  IVPB 750 milliGRAM(s) IV Intermittent every 12 hours    ENDOCRINE  Capillary Blood Glucose:  POCT Blood Glucose.: 150 mg/dL (13 Aug 2018 05:56)  POCT Blood Glucose.: 155 mg/dL (12 Aug 2018 23:02)  POCT Blood Glucose.: 191 mg/dL (12 Aug 2018 17:28)  POCT Blood Glucose.: 70 mg/dL (12 Aug 2018 13:53)  POCT Blood Glucose.: 48 mg/dL (12 Aug 2018 13:51)  POCT Blood Glucose.: 111 mg/dL (12 Aug 2018 10:32)  Meds: insulin lispro (HumaLOG) corrective regimen sliding scale   SubCutaneous every 4 hours    ACCESS DEVICES:  [x] Peripheral IV  [x] Central Venous Line	[x] R	[ ] L	[x] IJ	[ ] Fem	[ ] SC	Placed: 8/8/2018  [ ] Arterial Line		[ ] R	[ ] L	[ ] Fem	[ ] Rad	[ ] Ax	Placed:   [ ] PICC:					[ ] Mediport  [x] Urinary Catheter, Date Placed: 7/26/2018  [x] Necessity of urinary, arterial, and venous catheters discussed    OTHER MEDICATIONS:  - chlorhexidine 0.12% Liquid 15 milliLiter(s) Swish and Spit <User Schedule>  - chlorhexidine 4% Liquid 1 Application(s) Topical <User Schedule>  - FIRST- Mouthwash  BLM 5 milliLiter(s) Swish and Spit daily    CODE STATUS: Full code.    IMAGING:

## 2018-08-13 NOTE — PROGRESS NOTE ADULT - ATTENDING COMMENTS
Please see resident note for full detail  Failed SBT on full support, may need trach  Pt very deconditioned, tolerating tube feed  Abx Vanco Merem for MRSA pneumonia sepsis bacteremia   at bed side kept updated

## 2018-08-13 NOTE — PROGRESS NOTE ADULT - ASSESSMENT
ASSESSMENT:  68 year old female presenting with septic shock from MRSA bacteremia secondary to infected right hemiarthroplasty hardware s/p right hip I&D, explantation of right hemiarthroplasty, placement of antibiotic spacer, and right femur ORIF.    PLAN:    Neuro: acute post-op pain, anxiety, depression, dementia, intubated  - Monitor mental status.  - Home regimen of Klonopin, Zoloft, Seroquel, Remeron, and melatonin for agitation related to her dementia.  - Pain control with Tylenol, oxycodone, and Dilaudid.  - Sedation with propofol while intubated.    Resp: acute respiratory distress, asthma, possible PNA  - Monitor pulse oximeter and ABGs.  - Mechanical ventilation for acute respiratory failure. Patient has been reintubated 3 times this hospitalization so will likely need tracheostomy.  - Home prednisone 10 mg daily, Pulmicort, and Duoneb for asthma.    CV: distributive shock (resolved)  - Monitor vital signs.    GI: no acute issues  - NPO with Pivot at goal of 45 mL/hr.  - Famotidine for GERD.  - Bowel regimen with Colace & senna.    Renal: CKD stage 2, hyponatremia  - Monitor I&Os.  - Monitor electrolytes and replete as necessary.  - Assess need for diuresis.    Heme: no acute issues  - Lovenox for VTE prophylaxis.    ID: MRSA bacteremia, infected right hip s/p I&D, explantation of hardware, & placement of antibiotic spacer, UTI, concern for PNA  - Monitor WBC, temperature, and procalcitonin.  - Follow up urine and blood cultures. Consider Combicath. Blood cultures from 8/6/2018 with no growth of MRSA.  - Vancomycin and meropenem for MRSA bacteremia and possible PNA. Appreciate ID recommendations.  - Awaiting PICC placement for long-term IV antibiotics.    Endo: hyperglycemia  - Monitor fingersticks q4hrs for hypoglycemia.    Disposition:  - Full code.  - Will remain in SICU for respiratory monitoring.    Cynthia Wilkins PA-C    [id number]

## 2018-08-13 NOTE — PROGRESS NOTE ADULT - ASSESSMENT
67 yo female with dementia, history of UC, and s/p RT Hip hemiarthroplasty 6/22/18  postsurgical hematoma  admitted with low grade fever, rigors, increased confusion, found to have leukocytosis, 20% bands, ICU, pressor use   Bld Cxs 7/26-8/1 still positive all MRSA  s/p R hip I&D, lavage, poly exchange- pus encountered, all specimens S aureus  Afebrile, Creat stable  Dapto and ceftaroline used for a short time,concerns of rash led to dapto and rifampin, fever led to switch to vanco as sole MRSA agent  GAYATHRI: no evidence of valvular vegetation  s/p return to OR, removal of hardware/spacer,  perhaps effective source control  leukocytosis has resolved, etiology of low grade fever not clear  Candida in urine is likely a colonizer, no pressing need to treat it.  Plan:  1continue vanco for MRSA  2.Meropenem is empiric, ? 5-7 days total  3.wean per SICU  4., daughter updated at bedside, questions answered

## 2018-08-13 NOTE — PROGRESS NOTE ADULT - SUBJECTIVE AND OBJECTIVE BOX
---___---___---___---___---___---___ ---___---___---___---___---___---___---___---___---___---                    <<<  M E D I C A L   A T T E N D I N G    F O L L O W    U P   N O T E  >>>    patient remains intubated unable to be weaned off for now . considering tracheostomy    ---___---___---___---___---___---      <<<  MEDICATIONS:  >>>    MEDICATIONS  (STANDING):  ALBUTerol/ipratropium for Nebulization 3 milliLiter(s) Nebulizer every 6 hours  buDESOnide   0.5 milliGRAM(s) Respule 0.5 milliGRAM(s) Inhalation every 12 hours  chlorhexidine 0.12% Liquid 15 milliLiter(s) Swish and Spit <User Schedule>  chlorhexidine 4% Liquid 1 Application(s) Topical <User Schedule>  clonazePAM Tablet 2 milliGRAM(s) Oral at bedtime  enoxaparin Injectable 40 milliGRAM(s) SubCutaneous daily  famotidine    Tablet 20 milliGRAM(s) Oral daily  FIRST- Mouthwash  BLM 5 milliLiter(s) Swish and Spit daily  insulin lispro (HumaLOG) corrective regimen sliding scale   SubCutaneous every 4 hours  lidocaine   Patch 1 Patch Transdermal every 24 hours  melatonin 3 milliGRAM(s) Oral <User Schedule>  meropenem  IVPB 1000 milliGRAM(s) IV Intermittent every 8 hours  mirtazapine 45 milliGRAM(s) Oral <User Schedule>  predniSONE   Tablet 10 milliGRAM(s) Oral every 24 hours  propofol Infusion 10 MICROgram(s)/kG/Min (3.024 mL/Hr) IV Continuous <Continuous>  QUEtiapine 50 milliGRAM(s) Oral <User Schedule>  sertraline 50 milliGRAM(s) Oral daily  vancomycin  IVPB 750 milliGRAM(s) IV Intermittent every 12 hours      MEDICATIONS  (PRN):  HYDROmorphone  Injectable 0.5 milliGRAM(s) IV Push every 4 hours PRN Severe Breakthrough Pain  oxyCODONE    Solution 5 milliGRAM(s) Enteral Tube every 4 hours PRN Severe Pain (7 - 10)       ---___---___---___---___---___---     <<<REVIEW OF SYSTEM: >>>    GEN: no fever, no chills, no pain  RESP: no SOB, no cough, no sputum  CVS: no chest pain, no palpitations, no edema  GI: no abdominal pain, no nausea, no vomiting, no constipation, no diarrhea  : no dysurea, no frequency  NEURO: no headache, no dizziness  PSYCH: no depression, not anxious  Derm : no itching, no rash     ---___---___---___---___---___---          <<<  VITAL SIGNS: >>>    T(F): 99.4 (18 @ 07:00), Max: 101.1 (18 @ 00:00)  HR: 101 (18 @ 16:33) (84 - 125)  BP: 118/57 (18 @ 15:00) (84/49 - 141/66)  RR: 26 (18 @ 15:00) (20 - 51)  SpO2: 99% (18 @ 16:33) (98% - 100%)  Wt(kg): --  CAPILLARY BLOOD GLUCOSE      POCT Blood Glucose.: 99 mg/dL (13 Aug 2018 13:57)    I&O's Summary    12 Aug 2018 07:  -  13 Aug 2018 07:00  --------------------------------------------------------  IN: 2047.8 mL / OUT: 695 mL / NET: 1352.8 mL    13 Aug 2018 07:01  -  13 Aug 2018 17:14  --------------------------------------------------------  IN: 530 mL / OUT: 315 mL / NET: 215 mL         ---___---___---___---___---___---                       PHYSICAL EXAM:    GEN: A&O X 2 , NAD , comfortable  HEENT: NCAT, PERRL, MMM, no scleral icterus, hearing intact intubated ngt noted   NECK: Supple, No JVD  CVS: S1S2 , regular , No M/R/G appreciated  PULM: CTA B/L,  no W/R/R appreciated  ABD.: soft. non tender, non distended,  bowel sounds present  Extrem: intact pulses , no edema noted  Derm: No rash or ecchymosis noted      ---___---___---___---___---___---     <<<  LAB AND IMAGING: >>>                          7.6    6.7   )-----------( 289      ( 13 Aug 2018 11:07 )             24.0               08-    138  |  105  |  46<H>  ----------------------------<  111<H>  4.7   |  22  |  1.16    Ca    7.3<L>      13 Aug 2018 03:06  Phos  2.7       Mg     1.9         TPro  4.5<L>  /  Alb  1.5<L>  /  TBili  0.2  /  DBili  <0.1  /  AST  17  /  ALT  22  /  AlkPhos  136<H>  08-13           Urinalysis Basic - ( 11 Aug 2018 21:06 )    Color: Yellow / Appearance: SL Turbid / S.022 / pH: x  Gluc: x / Ketone: Negative  / Bili: Negative / Urobili: Negative   Blood: x / Protein: 150 mg/dL / Nitrite: Negative   Leuk Esterase: Large / RBC: 2-5 /HPF / WBC >50 /HPF   Sq Epi: x / Non Sq Epi: x / Bacteria: Few /HPF      CARDIAC MARKERS ( 12 Aug 2018 02:21 )  x     / x     / 73 U/L / x     / x                ABG - ( 13 Aug 2018 03:01 )  pH, Arterial: 7.48  pH, Blood: x     /  pCO2: 34    /  pO2: 193   / HCO3: 25    / Base Excess: 1.6   /  SaO2: 100                     [All pertinent / recent available Imaging reports and other labs reviewed]     ---___---___---___---___---___---___ ---___---___---___---___---           <<<  A S S E S S M E N T   A N D   P L A N :  >>>          -GI/DVT Prophylaxis.    --------------------------------------------  Case discussed with  mr roque (pt )   Education given on     >>______________________<<      Deniz Bolden .         phone   [phone]

## 2018-08-13 NOTE — PROGRESS NOTE ADULT - SUBJECTIVE AND OBJECTIVE BOX
Ortho Progress Note    S: Patient seen and examined. Intubated.    O:  Physical Exam:  Gen: Laying in bed,   Resp: intubated  Ext: aquacell c/d/i. Unable to perform neurovascular exam secondary to patient status. Capillary refill brisk. Compartments soft and compressible.      68F s/p R hip PJI explant and placement of cement spacer w/ ORIF distal femur    Pain control  DVT ppx lovenox  FU Cultures  Abx per ID  NWB LLE  FFWB RLE  PT/OT

## 2018-08-13 NOTE — PROGRESS NOTE ADULT - SUBJECTIVE AND OBJECTIVE BOX
CC: f/u for MRSA bacteremia and fever    Patient reports: she is on vent, appears alert and interactive    REVIEW OF SYSTEMS:  All other review of systems negative (Comprehensive ROS): minimal ET secretions, intermittent loose stool    Antimicrobials Day #  :Day 4 meropenem, day  MRSA therapy  meropenem  IVPB 1000 milliGRAM(s) IV Intermittent every 8 hours  vancomycin  IVPB 750 milliGRAM(s) IV Intermittent every 12 hours    Other Medications Reviewed    T(F): 99.4 (18 @ 07:00), Max: 101.1 (18 @ 00:00)  HR: 98 (18 @ 14:00)  BP: 114/58 (18 @ 14:00)  RR: 27 (18 @ 14:00)  SpO2: 100% (18 @ 14:00)  Wt(kg): --    PHYSICAL EXAM:  General: alert, no acute distress, on vent  Eyes:  anicteric, no conjunctival injection, no discharge  Oropharynx: ETT 	  Neck: supple, without adenopathy  Lungs:coarse BS  Heart: regular rate and rhythm; no murmur, rubs or gallops  Abdomen: soft, nondistended, nontender, without mass or organomegaly  Skin: no lesions  Extremities: no clubbing, cyanosis,+ edema  Neurologic: alert, oriented, moves all extremities  Rt hip incision is c/d/i    LAB RESULTS:                        7.6    6.7   )-----------( 289      ( 13 Aug 2018 11:07 )             24.0     -    138  |  105  |  46<H>  ----------------------------<  111<H>  4.7   |  22  |  1.16    Ca    7.3<L>      13 Aug 2018 03:06  Phos  2.7       Mg     1.9         TPro  4.5<L>  /  Alb  1.5<L>  /  TBili  0.2  /  DBili  <0.1  /  AST  17  /  ALT  22  /  AlkPhos  136<H>      LIVER FUNCTIONS - ( 13 Aug 2018 03:06 )  Alb: 1.5 g/dL / Pro: 4.5 g/dL / ALK PHOS: 136 U/L / ALT: 22 U/L / AST: 17 U/L / GGT: x           Urinalysis Basic - ( 11 Aug 2018 21:06 )    Color: Yellow / Appearance: SL Turbid / S.022 / pH: x  Gluc: x / Ketone: Negative  / Bili: Negative / Urobili: Negative   Blood: x / Protein: 150 mg/dL / Nitrite: Negative   Leuk Esterase: Large / RBC: 2-5 /HPF / WBC >50 /HPF   Sq Epi: x / Non Sq Epi: x / Bacteria: Few /HPF      MICROBIOLOGY:  RECENT CULTURES:   @ 00:16 .Blood Blood     No growth to date.       @ 21:17 .Urine Catheterized     50,000 - 99,000 CFU/mL Presumptive Candida albicans       @ 20:07 .Blood Blood     No growth to date.       @ 17:44 .Blood Blood     No growth to date.          RADIOLOGY REVIEWED:  < from: Xray Chest 1 View- PORTABLE-Routine (18 @ 07:06) >  INTERPRETATION:  CLINICAL INFORMATION: Assess for pulmonary vascular   congestion.    TECHNIQUE: Single AP radiograph of the chest dated 2018.    COMPARISON: Chest radiograph dated 2018.    FINDINGS:  Endotracheal tube with distal tip between the level of clavicles and   stoney. Enteric tube with distal tip in the stomach. Right-sided IJ   central venous catheter with distal tip in SVC  The lungs are clear. Bilateral pleural effusions, now resolved.  No pneumothorax.  Heart size cannot be accurately assessed in this projection.   No acute osseous abnormality.    IMPRESSION: Clear lungs.    < end of copied text >

## 2018-08-14 LAB
ANION GAP SERPL CALC-SCNC: 11 MMOL/L — SIGNIFICANT CHANGE UP (ref 5–17)
BUN SERPL-MCNC: 48 MG/DL — HIGH (ref 7–23)
CALCIUM SERPL-MCNC: 7.6 MG/DL — LOW (ref 8.4–10.5)
CHLORIDE SERPL-SCNC: 102 MMOL/L — SIGNIFICANT CHANGE UP (ref 96–108)
CO2 SERPL-SCNC: 23 MMOL/L — SIGNIFICANT CHANGE UP (ref 22–31)
CREAT SERPL-MCNC: 1.01 MG/DL — SIGNIFICANT CHANGE UP (ref 0.5–1.3)
CULTURE RESULTS: SIGNIFICANT CHANGE UP
CULTURE RESULTS: SIGNIFICANT CHANGE UP
GAS PNL BLDA: SIGNIFICANT CHANGE UP
GLUCOSE SERPL-MCNC: 184 MG/DL — HIGH (ref 70–99)
HAPTOGLOB SERPL-MCNC: 330 MG/DL — HIGH (ref 34–200)
HCT VFR BLD CALC: 24.8 % — LOW (ref 34.5–45)
HGB BLD-MCNC: 7.8 G/DL — LOW (ref 11.5–15.5)
IRON SATN MFR SERPL: 14 UG/DL — LOW (ref 30–160)
IRON SATN MFR SERPL: 16 % — SIGNIFICANT CHANGE UP (ref 14–50)
LDH SERPL L TO P-CCNC: 322 U/L — HIGH (ref 50–242)
MAGNESIUM SERPL-MCNC: 2.1 MG/DL — SIGNIFICANT CHANGE UP (ref 1.6–2.6)
MCHC RBC-ENTMCNC: 29.2 PG — SIGNIFICANT CHANGE UP (ref 27–34)
MCHC RBC-ENTMCNC: 31.7 GM/DL — LOW (ref 32–36)
MCV RBC AUTO: 92 FL — SIGNIFICANT CHANGE UP (ref 80–100)
PHOSPHATE SERPL-MCNC: 3.7 MG/DL — SIGNIFICANT CHANGE UP (ref 2.5–4.5)
PLATELET # BLD AUTO: 323 K/UL — SIGNIFICANT CHANGE UP (ref 150–400)
POTASSIUM SERPL-MCNC: 4.7 MMOL/L — SIGNIFICANT CHANGE UP (ref 3.5–5.3)
POTASSIUM SERPL-SCNC: 4.7 MMOL/L — SIGNIFICANT CHANGE UP (ref 3.5–5.3)
RBC # BLD: 2.69 M/UL — LOW (ref 3.8–5.2)
RBC # BLD: 2.69 M/UL — LOW (ref 3.8–5.2)
RBC # FLD: 14.7 % — HIGH (ref 10.3–14.5)
RETICS #: 27 K/UL — SIGNIFICANT CHANGE UP (ref 25–125)
RETICS/RBC NFR: 1 % — SIGNIFICANT CHANGE UP (ref 0.5–2.5)
SODIUM SERPL-SCNC: 136 MMOL/L — SIGNIFICANT CHANGE UP (ref 135–145)
SPECIMEN SOURCE: SIGNIFICANT CHANGE UP
SPECIMEN SOURCE: SIGNIFICANT CHANGE UP
TIBC SERPL-MCNC: 85 UG/DL — LOW (ref 220–430)
TRANSFERRIN SERPL-MCNC: 83 MG/DL — LOW (ref 200–360)
UIBC SERPL-MCNC: 71 UG/DL — LOW (ref 110–370)
VANCOMYCIN TROUGH SERPL-MCNC: 30.2 UG/ML — CRITICAL HIGH (ref 10–20)
WBC # BLD: 9 K/UL — SIGNIFICANT CHANGE UP (ref 3.8–10.5)
WBC # FLD AUTO: 9 K/UL — SIGNIFICANT CHANGE UP (ref 3.8–10.5)

## 2018-08-14 PROCEDURE — 71045 X-RAY EXAM CHEST 1 VIEW: CPT | Mod: 26,77

## 2018-08-14 PROCEDURE — 71045 X-RAY EXAM CHEST 1 VIEW: CPT | Mod: 26

## 2018-08-14 PROCEDURE — 99291 CRITICAL CARE FIRST HOUR: CPT

## 2018-08-14 RX ORDER — MULTIVIT-MIN/FERROUS GLUCONATE 9 MG/15 ML
1 LIQUID (ML) ORAL DAILY
Qty: 0 | Refills: 0 | Status: DISCONTINUED | OUTPATIENT
Start: 2018-08-14 | End: 2018-08-27

## 2018-08-14 RX ORDER — ASCORBIC ACID 60 MG
500 TABLET,CHEWABLE ORAL DAILY
Qty: 0 | Refills: 0 | Status: COMPLETED | OUTPATIENT
Start: 2018-08-14 | End: 2018-08-24

## 2018-08-14 RX ORDER — IRON SUCROSE 20 MG/ML
100 INJECTION, SOLUTION INTRAVENOUS EVERY 24 HOURS
Qty: 0 | Refills: 0 | Status: COMPLETED | OUTPATIENT
Start: 2018-08-14 | End: 2018-08-23

## 2018-08-14 RX ORDER — CALCIUM GLUCONATE 100 MG/ML
2 VIAL (ML) INTRAVENOUS ONCE
Qty: 0 | Refills: 0 | Status: COMPLETED | OUTPATIENT
Start: 2018-08-14 | End: 2018-08-14

## 2018-08-14 RX ORDER — SODIUM CHLORIDE 9 MG/ML
1000 INJECTION INTRAMUSCULAR; INTRAVENOUS; SUBCUTANEOUS
Qty: 0 | Refills: 0 | Status: DISCONTINUED | OUTPATIENT
Start: 2018-08-14 | End: 2018-08-24

## 2018-08-14 RX ADMIN — SERTRALINE 50 MILLIGRAM(S): 25 TABLET, FILM COATED ORAL at 11:54

## 2018-08-14 RX ADMIN — Medication 3 MILLILITER(S): at 05:17

## 2018-08-14 RX ADMIN — Medication 1: at 17:15

## 2018-08-14 RX ADMIN — Medication 3 MILLIGRAM(S): at 21:30

## 2018-08-14 RX ADMIN — Medication 1: at 21:42

## 2018-08-14 RX ADMIN — Medication 200 GRAM(S): at 05:31

## 2018-08-14 RX ADMIN — Medication 0.5 MILLIGRAM(S): at 17:36

## 2018-08-14 RX ADMIN — CHLORHEXIDINE GLUCONATE 15 MILLILITER(S): 213 SOLUTION TOPICAL at 13:36

## 2018-08-14 RX ADMIN — MEROPENEM 100 MILLIGRAM(S): 1 INJECTION INTRAVENOUS at 13:36

## 2018-08-14 RX ADMIN — OXYCODONE HYDROCHLORIDE 5 MILLIGRAM(S): 5 TABLET ORAL at 20:00

## 2018-08-14 RX ADMIN — Medication 2 MILLIGRAM(S): at 21:30

## 2018-08-14 RX ADMIN — SODIUM CHLORIDE 10 MILLILITER(S): 9 INJECTION INTRAMUSCULAR; INTRAVENOUS; SUBCUTANEOUS at 16:18

## 2018-08-14 RX ADMIN — FAMOTIDINE 20 MILLIGRAM(S): 10 INJECTION INTRAVENOUS at 11:54

## 2018-08-14 RX ADMIN — Medication 1 TABLET(S): at 16:01

## 2018-08-14 RX ADMIN — QUETIAPINE FUMARATE 50 MILLIGRAM(S): 200 TABLET, FILM COATED ORAL at 21:30

## 2018-08-14 RX ADMIN — Medication 3 MILLILITER(S): at 12:03

## 2018-08-14 RX ADMIN — ENOXAPARIN SODIUM 40 MILLIGRAM(S): 100 INJECTION SUBCUTANEOUS at 11:54

## 2018-08-14 RX ADMIN — Medication 0.5 MILLIGRAM(S): at 05:18

## 2018-08-14 RX ADMIN — Medication 3 MILLILITER(S): at 17:36

## 2018-08-14 RX ADMIN — Medication 500 MILLIGRAM(S): at 16:01

## 2018-08-14 RX ADMIN — MIRTAZAPINE 45 MILLIGRAM(S): 45 TABLET, ORALLY DISINTEGRATING ORAL at 21:32

## 2018-08-14 RX ADMIN — Medication 1: at 09:10

## 2018-08-14 RX ADMIN — MEROPENEM 100 MILLIGRAM(S): 1 INJECTION INTRAVENOUS at 21:36

## 2018-08-14 RX ADMIN — Medication 10 MILLIGRAM(S): at 11:54

## 2018-08-14 RX ADMIN — Medication 3 MILLILITER(S): at 23:39

## 2018-08-14 RX ADMIN — OXYCODONE HYDROCHLORIDE 5 MILLIGRAM(S): 5 TABLET ORAL at 19:02

## 2018-08-14 RX ADMIN — CHLORHEXIDINE GLUCONATE 1 APPLICATION(S): 213 SOLUTION TOPICAL at 05:02

## 2018-08-14 RX ADMIN — IRON SUCROSE 210 MILLIGRAM(S): 20 INJECTION, SOLUTION INTRAVENOUS at 16:01

## 2018-08-14 RX ADMIN — LIDOCAINE 1 PATCH: 4 CREAM TOPICAL at 09:49

## 2018-08-14 RX ADMIN — MEROPENEM 100 MILLIGRAM(S): 1 INJECTION INTRAVENOUS at 05:03

## 2018-08-14 RX ADMIN — DIPHENHYDRAMINE HYDROCHLORIDE AND LIDOCAINE HYDROCHLORIDE AND ALUMINUM HYDROXIDE AND MAGNESIUM HYDRO 5 MILLILITER(S): KIT at 11:55

## 2018-08-14 RX ADMIN — LIDOCAINE 1 PATCH: 4 CREAM TOPICAL at 21:31

## 2018-08-14 RX ADMIN — CHLORHEXIDINE GLUCONATE 15 MILLILITER(S): 213 SOLUTION TOPICAL at 05:02

## 2018-08-14 RX ADMIN — FLUCONAZOLE 50 MILLIGRAM(S): 150 TABLET ORAL at 09:30

## 2018-08-14 RX ADMIN — CHLORHEXIDINE GLUCONATE 15 MILLILITER(S): 213 SOLUTION TOPICAL at 21:30

## 2018-08-14 RX ADMIN — Medication 1: at 03:01

## 2018-08-14 NOTE — PROGRESS NOTE ADULT - ATTENDING COMMENTS
Failed on 2 hr weaning trial, tired out tachypneic, back on full support  Off vasopressor support  Tolerating tube feed  Continue abx vanco and Merem and fluconazole  SBT again  pt more awake  Mobilization

## 2018-08-14 NOTE — PROGRESS NOTE ADULT - SUBJECTIVE AND OBJECTIVE BOX
HISTORY: 68 year old female with a past medical history of asthma on steroids, CVA (no residual deficits), pulmonary HTN, HLD, GERD, ulcerative colitis, fatty pancreas, impaired glucose tolerance, anxiety, and depression who presented on 7/26/2018 with altered mental status, rigors, and urinary frequency. Of note, patient was recently hospitalized for a right femoral neck fracture secondary to osteoporosis s/p hemiarthroplasty on 6/22/2018. She had been taking Plavix for her history of CVA and Lovenox for VTE prophylaxis but she developed a hematoma at her surgical site so the Lovenox was stopped. Since then, patient has been progressively more agitated, confused, and weak. Additionally, patient has been complaining of new onset low back pain and left hip pain. In the ED, patient was hemodynamically stable but noted to be hypoglycemic. She was also started on meropenem & vancomycin for concern of sepsis. CT scan revealed a 17 cm collection adjacent to the right hip prosthesis, an acute L2 compression fracture, and new left acetabular fracture. Patient was admitted to medicine but on 7/27/2018, patient was noted to be more altered, rigorous, tachypneic, febrile to 101 F, and hypotensive w/ SBP in the 60s so an RRT was called. She was more fluid and started on a norepinephrine gtt. Patient subsequently admitted to SICU for hemodynamic monitoring. Blood cultures positive for MRSA. Decision was made to take the patient to the OR on 7/27/2018 for right hip I&D and exchange of the femoral head. She returned to the SICU intubated on vasopressor support. She was extubated on 7/30/2018 but was reintubated on 8/2/2018. Additionally, patient was persistently on vasopressor support and bacteremic with MRSA. Patient was subsequently taken back to the OR on 8/4/2018 for further right hip I&D, explantation of right hemiarthroplasty, placement of antibiotic spacer, and right femur ORIF. Patient was subsequently weaned off vasopressor support on 8/5/2018 and extubated on 8/6/2018 but required reintubation for tachypnea with accessory muscle use on 8/11/2018.    24 HOUR EVENTS:  - Failed SBT secondary to tachypnea into the 40s.  - Started Diflucan for Candiduria for 5 days. HISTORY: 68 year old female with a past medical history of asthma on steroids, CVA (no residual deficits), pulmonary HTN, HLD, GERD, ulcerative colitis, fatty pancreas, impaired glucose tolerance, anxiety, and depression who presented on 7/26/2018 with altered mental status, rigors, and urinary frequency. Of note, patient was recently hospitalized for a right femoral neck fracture secondary to osteoporosis s/p hemiarthroplasty on 6/22/2018. She had been taking Plavix for her history of CVA and Lovenox for VTE prophylaxis but she developed a hematoma at her surgical site so the Lovenox was stopped. Since then, patient has been progressively more agitated, confused, and weak. Additionally, patient has been complaining of new onset low back pain and left hip pain. In the ED, patient was hemodynamically stable but noted to be hypoglycemic. She was also started on meropenem & vancomycin for concern of sepsis. CT scan revealed a 17 cm collection adjacent to the right hip prosthesis, an acute L2 compression fracture, and new left acetabular fracture. Patient was admitted to medicine but on 7/27/2018, patient was noted to be more altered, rigorous, tachypneic, febrile to 101 F, and hypotensive w/ SBP in the 60s so an RRT was called. She was more fluid and started on a norepinephrine gtt. Patient subsequently admitted to SICU for hemodynamic monitoring. Blood cultures positive for MRSA. Decision was made to take the patient to the OR on 7/27/2018 for right hip I&D and exchange of the femoral head. She returned to the SICU intubated on vasopressor support. She was extubated on 7/30/2018 but was reintubated on 8/2/2018. Additionally, patient was persistently on vasopressor support and bacteremic with MRSA. Patient was subsequently taken back to the OR on 8/4/2018 for further right hip I&D, explantation of right hemiarthroplasty, placement of antibiotic spacer, and right femur ORIF. Patient was subsequently weaned off vasopressor support on 8/5/2018 and extubated on 8/6/2018 but required reintubation for tachypnea with accessory muscle use on 8/11/2018.    24 HOUR EVENTS:  - Failed SBT secondary to tachypnea into the 40s.  - Started Diflucan for Candiduria for 5 days.    SUBJECTIVE/ROS:  [ ] A ten-point review of systems was otherwise negative except as noted.  [x] Due to altered mental status/intubation, subjective information were not able to be obtained from the patient. History was obtained, to the extent possible, from review of the chart and collateral sources of information.    NEURO  Exam: awake, follows all commands, intermittently agitated, moving all four extremities  Meds:  - clonazePAM Tablet 2 milliGRAM(s) Oral at bedtime  - melatonin 3 milliGRAM(s) Oral <User Schedule>  - mirtazapine 45 milliGRAM(s) Oral <User Schedule>  - oxyCODONE    Solution 5 milliGRAM(s) Enteral Tube every 4 hours PRN Severe Pain (7 - 10)  - propofol Infusion 10 MICROgram(s)/kG/Min IV Continuous <Continuous>  - QUEtiapine 50 milliGRAM(s) Oral <User Schedule>  - sertraline 50 milliGRAM(s) Oral daily  - lidocaine   Patch 1 Patch Transdermal every 24 hours  [x] Adequacy of sedation and pain control has been assessed and adjusted    RESPIRATORY  RR: 28 (08-14-18 @ 04:00) (21 - 45)  SpO2: 100% (08-14-18 @ 05:18) (97% - 100%)  Exam: clear to auscultation bilaterally  Mechanical Ventilation: Mode: AC/ CMV (Assist Control/ Continuous Mandatory Ventilation), RR (machine): 14, RR (patient): 28, TV (machine): 400, FiO2: 30, PEEP: 5, ITime: 1, MAP: 9, PIP: 17  [x] Extubation Readiness Assessed  ABG - ( 14 Aug 2018 02:53 )  pH: 7.48  /  pCO2: 33    /  pO2: 179   / HCO3: 24    / Base Excess: 1.3   /  SaO2: 100    /    Lactate: 0.6  Meds:  - ALBUTerol/ipratropium for Nebulization 3 milliLiter(s) Nebulizer every 6 hours  - buDESOnide   0.5 milliGRAM(s) Respule 0.5 milliGRAM(s) Inhalation every 12 hours  - predniSONE   Tablet 10 milliGRAM(s) Oral every 24 hours    CARDIOVASCULAR  HR: 100 (08-14-18 @ 05:18) (91 - 111)  BP: 136/65 (08-14-18 @ 04:00) (98/53 - 141/66)  BP(mean): 94 (08-14-18 @ 04:00) (74 - 106)  Exam: regular rate and rhythm, S1S2  Cardiac Rhythm: sinus  Perfusion    [x]Adequate    [ ]Inadequate  Mentation   [ ]Normal       [x]Reduced  Extremities  [x]Warm         [ ]Cool  Volume Status [ ]Hypervolemic [x]Euvolemic [ ]Hypovolemic  Meds: none    GI/NUTRITION  Exam: soft, nontender, nondistended  Diet: NPO with Pivot at 30 mL/hr  Meds: famotidine    Tablet 20 milliGRAM(s) Oral daily    GENITOURINARY      136  |  102  |  48<H>  ----------------------------<  184<H>  4.7   |  23  |  1.01    Ca    7.6<L>      14 Aug 2018 03:15  Phos  3.7  Mg     2.1    [x] Blackman catheter, indication: urine output monitoring in the critically ill  Meds: none    HEMATOLOGIC  Meds: enoxaparin Injectable 40 milliGRAM(s) SubCutaneous daily  [x] VTE Prophylaxis                        7.8    9.0   )-----------( 323      ( 14 Aug 2018 03:15 )             24.8     INFECTIOUS DISEASES  T(C): 37.6 (08-14-18 @ 04:00), Max: 37.7 (08-14-18 @ 00:00)  WBC Count:  - 9.0 K/uL (08-14 @ 03:15)  - 6.7 K/uL (08-13 @ 11:07)  Recent Cultures:  Specimen Source: .Urine Catheterized, 08-12 @ 05:20; Results   <10,000 CFU/ml  Normal Urogenital vince present; Gram Stain: --; Organism: --  Specimen Source: .Blood Blood, 08-12 @ 00:16; Results   No growth to date.; Gram Stain: --; Organism: --  Specimen Source: .Urine Catheterized, 08-09 @ 21:17; Results   50,000 - 99,000 CFU/mL Presumptive Candida albicans; Gram Stain: --; Organism: --  Specimen Source: .Blood Blood, 08-09 @ 20:07; Results   No growth to date.; Gram Stain: --; Organism: --  Specimen Source: .Blood Blood, 08-09 @ 17:44; Results   No growth to date.; Gram Stain: --; Organism: --  Specimen Source: .Blood Blood, 08-08 @ 08:49; Results   No growth at 5 days.; Gram Stain: --; Organism: --  Specimen Source: .Blood Blood-Peripheral, 08-07 @ 09:00; Results   No growth at 5 days.; Gram Stain: --; Organism: --  Meds:  - fluconAZOLE IVPB 100 milliGRAM(s) IV Intermittent every 24 hours  - meropenem  IVPB 1000 milliGRAM(s) IV Intermittent every 8 hours  - vancomycin  IVPB 750 milliGRAM(s) IV Intermittent every 12 hours    ENDOCRINE  Capillary Blood Glucose:  POCT Blood Glucose.: 143 mg/dL (14 Aug 2018 05:28)  POCT Blood Glucose.: 165 mg/dL (13 Aug 2018 21:43)  POCT Blood Glucose.: 195 mg/dL (13 Aug 2018 18:40)  POCT Blood Glucose.: 99 mg/dL (13 Aug 2018 13:57)  POCT Blood Glucose.: 177 mg/dL (13 Aug 2018 09:32)  POCT Blood Glucose.: 150 mg/dL (13 Aug 2018 05:56)  Meds: insulin lispro (HumaLOG) corrective regimen sliding scale   SubCutaneous every 4 hours    ACCESS DEVICES:  [x] Peripheral IV  [x] Central Venous Line	[x] R	[ ] L	[x] IJ	[ ] Fem	[ ] SC	Placed: 8/8/2018  [ ] Arterial Line		[ ] R	[ ] L	[ ] Fem	[ ] Rad	[ ] Ax	Placed:   [ ] PICC:					[ ] Mediport  [x] Urinary Catheter, Date Placed: 7/26/2018  [x] Necessity of urinary, arterial, and venous catheters discussed    OTHER MEDICATIONS:  - chlorhexidine 0.12% Liquid 15 milliLiter(s) Swish and Spit <User Schedule>  - chlorhexidine 4% Liquid 1 Application(s) Topical <User Schedule>  - FIRST- Mouthwash  BLM 5 milliLiter(s) Swish and Spit daily    CODE STATUS: Full code.    IMAGING:

## 2018-08-14 NOTE — PROGRESS NOTE ADULT - SUBJECTIVE AND OBJECTIVE BOX
CC: f/u for MRSA bacteremia, septic RT hip    Patient reports: she is on vent, appears alert and is cooperative.    REVIEW OF SYSTEMS:  All other review of systems negative (Comprehensive ROS): still on vent, FIO2 30%, PEEP5, tolerating enteral feeds    Antimicrobials Day #  fluconazole day 2, meropenem day 5, vanco day 10-on hold  fluconAZOLE IVPB 100 milliGRAM(s) IV Intermittent every 24 hours  meropenem  IVPB 1000 milliGRAM(s) IV Intermittent every 8 hours    Other Medications Reviewed    T(F): 98.1 (08-14-18 @ 07:00), Max: 99.9 (08-14-18 @ 00:00)  HR: 107 (08-14-18 @ 10:00)  BP: 141/78 (08-14-18 @ 10:00)  RR: 38 (08-14-18 @ 10:00)  SpO2: 100% (08-14-18 @ 10:00)  Wt(kg): --    PHYSICAL EXAM:  General: alert, no acute distress, on vent   Eyes:  anicteric, no conjunctival injection, no discharge  Oropharynx: ETT 	  Neck: supple, without adenopathy, Rt neck TLC  Lungs: coarse BS  Heart: regular rate and rhythm; no murmur, rubs or gallops  Abdomen: soft, nondistended, nontender, without mass or organomegaly  Skin: no lesions  Extremities: no clubbing, cyanosis, + edema  Neurologic: alert, oriented, moves all extremities  RT hip incision is dressed, no drainage on dressing  LAB RESULTS:                        7.8    9.0   )-----------( 323      ( 14 Aug 2018 03:15 )             24.8     08-14    136  |  102  |  48<H>  ----------------------------<  184<H>  4.7   |  23  |  1.01    Ca    7.6<L>      14 Aug 2018 03:15  Phos  3.7     08-14  Mg     2.1     08-14    TPro  4.5<L>  /  Alb  1.5<L>  /  TBili  0.2  /  DBili  <0.1  /  AST  17  /  ALT  22  /  AlkPhos  136<H>  08-13    LIVER FUNCTIONS - ( 13 Aug 2018 03:06 )  Alb: 1.5 g/dL / Pro: 4.5 g/dL / ALK PHOS: 136 U/L / ALT: 22 U/L / AST: 17 U/L / GGT: x             MICROBIOLOGY:  RECENT CULTURES:  08-12 @ 05:20 .Urine Catheterized     <10,000 CFU/ml  Normal Urogenital vince present      08-12 @ 00:16 .Blood Blood     No growth to date.      08-09 @ 21:17 .Urine Catheterized     50,000 - 99,000 CFU/mL Presumptive Candida albicans      08-09 @ 20:07 .Blood Blood     No growth to date.      08-09 @ 17:44 .Blood Blood     No growth to date.          RADIOLOGY REVIEWED:  < from: Xray Chest 1 View- PORTABLE-Routine (08.13.18 @ 07:06) >  IMPRESSION: Clear lungs.    < end of copied text >

## 2018-08-14 NOTE — PROGRESS NOTE ADULT - ASSESSMENT
67 yo female with dementia, history of UC, and s/p RT Hip hemiarthroplasty 6/22/18  postsurgical hematoma with secondary infection  Admitted with low grade fever, rigors, increased confusion, found to have leukocytosis, 20% bands, ICU, pressor use   Bld Cxs 7/26-8/1 still positive all MRSA  s/p R hip I&D, lavage, poly exchange- pus encountered, all specimens S aureus.Initial surgery 7/27, repeat debridement 8/4  Afebrile, Creat stable  Dapto and ceftaroline used for a short time,concerns of rash led to dapto and rifampin, fever led to switch to vanco as sole MRSA agent  GAYATHRI: no evidence of valvular vegetation  s/p return to OR, removal of hardware/spacer,  perhaps effective source control on 8/4  leukocytosis has resolved, etiology of recent  fevers not clear  Candida in urine is likely a colonizer, no pressing need to treat it.  Meropenem use is empiric for possible GNR infection, although CXR has been clear, scant ET secretions, and urine has grown yeast on 8/9 and less than 10,000 on 8/12  Plan:  1continue vanco for MRSA, now on hold due to level of 32  2.Meropenem is empiric, ? 5-7 days total)today is day 5  3.wean per SICU, fluconazole per SICU  4.? If weanable or if trach will be needed

## 2018-08-14 NOTE — PROGRESS NOTE ADULT - ASSESSMENT
respiratory failure  unable to be weaned off vent but continue weaning trials   sepsis   anxiety   low iron  ferrtin given to increase iron load

## 2018-08-14 NOTE — CHART NOTE - NSCHARTNOTEFT_GEN_A_CORE
Pt seen for nutrition follow up, as per department protocol.     Source: Patient [X ]    Family [  ]     other [X ]; medical record.  Pt noted with AMS/Alzheimer's, currently intubated.    Hospital Course: Pt with history of dementia, presenting with septic shock from MRSA bacteremia secondary to infected right hemiarthroplasty hardware S/P right hip I&D, explantation of right hemiarthroplasty, placement of antibiotic spacer, and right femur ORIF on 8/4. Pt reintubated for second time on 8/11.    Diet : NPO with EN    GI: fecal incontinence noted 8/14     Enteral /Parenteral Nutrition: Pivot1.5 via NGT @ 30ml/hr x 24 hours to provide 720ml formula, 1080cal/day, 68Gm protein/day, 546ml free water; meets 21cal/Kg and 1.7Gm protein/Kg per dosing wt 50.4Kg.    24-hour EN provision = 750ml, meeting 100% of nutrition needs     Current Weight: Weight (kg): 59.8Kg (8/14), 60.5Kg (8/2). 50.4Kg (7/27 dosing); UBW per  = 45.5Kg (100 pounds)  Edema: 2+ dependent    Pertinent Medications: MEDICATIONS  (STANDING):  ALBUTerol/ipratropium for Nebulization 3 milliLiter(s) Nebulizer every 6 hours  ascorbic acid 500 milliGRAM(s) Oral daily  buDESOnide   0.5 milliGRAM(s) Respule 0.5 milliGRAM(s) Inhalation every 12 hours  chlorhexidine 0.12% Liquid 15 milliLiter(s) Swish and Spit <User Schedule>  chlorhexidine 4% Liquid 1 Application(s) Topical <User Schedule>  clonazePAM Tablet 2 milliGRAM(s) Oral at bedtime  enoxaparin Injectable 40 milliGRAM(s) SubCutaneous daily  famotidine    Tablet 20 milliGRAM(s) Oral daily  FIRST- Mouthwash  BLM 5 milliLiter(s) Swish and Spit daily  fluconAZOLE IVPB 100 milliGRAM(s) IV Intermittent every 24 hours  insulin lispro (HumaLOG) corrective regimen sliding scale   SubCutaneous every 4 hours  iron sucrose IVPB 100 milliGRAM(s) IV Intermittent every 24 hours  lidocaine   Patch 1 Patch Transdermal every 24 hours  melatonin 3 milliGRAM(s) Oral <User Schedule>  meropenem  IVPB 1000 milliGRAM(s) IV Intermittent every 8 hours  mirtazapine 45 milliGRAM(s) Oral <User Schedule>  multivitamin/minerals 1 Tablet(s) Oral daily  predniSONE   Tablet 10 milliGRAM(s) Oral every 24 hours  propofol Infusion 10 MICROgram(s)/kG/Min (3.024 mL/Hr) IV Continuous <Continuous>  QUEtiapine 50 milliGRAM(s) Oral <User Schedule>  sertraline 50 milliGRAM(s) Oral daily    MEDICATIONS  (PRN):  oxyCODONE    Solution 5 milliGRAM(s) Enteral Tube every 4 hours PRN Severe Pain (7 - 10)    Pertinent Labs:  08-14 Na136 mmol/L Glu 184 mg/dL<H> K+ 4.7 mmol/L Cr  1.01 mg/dL BUN 48 mg/dL<H> 08-14 Phos 3.7 mg/dL 08-13 Alb 1.5 g/dL<L> 07-27 [id number] 5.5 %    Skin: DTI left/right heels    Estimated Needs:   Estimated calorie needs (8/14) for intubated pt per [employer] Equation (PSU) 2003b: xxxxcal/day (xxcal/Kg)   64-73 Gm protein/day (1.4-1.6Gm/Kg per 45.5Kg)    Previous Nutrition Diagnosis:    [X ] Increased Nutrient Needs     Nutrition Diagnosis is [X ] ongoing; being addressed with EN at goal     New Nutrition Diagnosis: [ X] not applicable    Interventions:     Recommend:  1) Continue Pivot1.5 via NGT @ 30ml/hr x 24 hours to provide 720ml formula, 1080cal/day, 68Gm protein/day, 546ml free water; meets 21cal/Kg and 1.7Gm protein/Kg per dosing wt 50.4Kg, with consideration for intubation and pressure injuries  2) Continue micronutrient supplementation as medically appropriate   3) Monitor weight, lab values, skin, nutrition provision and GI tolerance    Monitoring and Evaluation:   Follow up per protocol  RD to remain available for further nutritional interventions as indicated.   Cyndi Haddad, MS RD N [facility], #[phone]. Pt seen for nutrition follow up, as per department protocol.     Source: Patient [X ]    Family [  ]     other [X ]; medical record.  Pt noted with AMS/Alzheimer's, currently intubated.    Hospital Course: Pt with history of dementia, presenting with septic shock from MRSA bacteremia secondary to infected right hemiarthroplasty hardware S/P right hip I&D, explantation of right hemiarthroplasty, placement of antibiotic spacer, and right femur ORIF on 8/4. Pt reintubated for second time on 8/11.    Diet : NPO with EN    GI: fecal incontinence noted 8/14     Enteral /Parenteral Nutrition: Pivot1.5 via NGT @ 30ml/hr x 24 hours to provide 720ml formula, 1080cal/day, 68Gm protein/day, 546ml free water; meets 21cal/Kg and 1.7Gm protein/Kg per dosing wt 50.4Kg.    24-hour EN provision = 750ml, meeting 100% of nutrition needs     Current Weight: Weight (kg): 59.8Kg (8/14), 60.5Kg (8/2). 50.4Kg (7/27 dosing); UBW per  = 45.5Kg (100 pounds)  Edema: 2+ dependent    Pertinent Medications: MEDICATIONS  (STANDING):  ALBUTerol/ipratropium for Nebulization 3 milliLiter(s) Nebulizer every 6 hours  ascorbic acid 500 milliGRAM(s) Oral daily  buDESOnide   0.5 milliGRAM(s) Respule 0.5 milliGRAM(s) Inhalation every 12 hours  chlorhexidine 0.12% Liquid 15 milliLiter(s) Swish and Spit <User Schedule>  chlorhexidine 4% Liquid 1 Application(s) Topical <User Schedule>  clonazePAM Tablet 2 milliGRAM(s) Oral at bedtime  enoxaparin Injectable 40 milliGRAM(s) SubCutaneous daily  famotidine    Tablet 20 milliGRAM(s) Oral daily  FIRST- Mouthwash  BLM 5 milliLiter(s) Swish and Spit daily  fluconAZOLE IVPB 100 milliGRAM(s) IV Intermittent every 24 hours  insulin lispro (HumaLOG) corrective regimen sliding scale   SubCutaneous every 4 hours  iron sucrose IVPB 100 milliGRAM(s) IV Intermittent every 24 hours  lidocaine   Patch 1 Patch Transdermal every 24 hours  melatonin 3 milliGRAM(s) Oral <User Schedule>  meropenem  IVPB 1000 milliGRAM(s) IV Intermittent every 8 hours  mirtazapine 45 milliGRAM(s) Oral <User Schedule>  multivitamin/minerals 1 Tablet(s) Oral daily  predniSONE   Tablet 10 milliGRAM(s) Oral every 24 hours  propofol Infusion 10 MICROgram(s)/kG/Min (3.024 mL/Hr) IV Continuous <Continuous>  QUEtiapine 50 milliGRAM(s) Oral <User Schedule>  sertraline 50 milliGRAM(s) Oral daily    MEDICATIONS  (PRN):  oxyCODONE    Solution 5 milliGRAM(s) Enteral Tube every 4 hours PRN Severe Pain (7 - 10)    Pertinent Labs:  08-14 Na136 mmol/L Glu 184 mg/dL<H> K+ 4.7 mmol/L Cr  1.01 mg/dL BUN 48 mg/dL<H> 08-14 Phos 3.7 mg/dL 08-13 Alb 1.5 g/dL<L> 07-27 [id number] 5.5 %    CAPILLARY BLOOD GLUCOSE  POCT Blood Glucose.: 84 mg/dL (14 Aug 2018 13:47)  POCT Blood Glucose.: 87 mg/dL (14 Aug 2018 13:46)  POCT Blood Glucose.: 159 mg/dL (14 Aug 2018 09:08)  POCT Blood Glucose.: 143 mg/dL (14 Aug 2018 05:28)  POCT Blood Glucose.: 165 mg/dL (13 Aug 2018 21:43)  POCT Blood Glucose.: 195 mg/dL (13 Aug 2018 18:40)    Skin: DTI left/right heels    Estimated Needs:   3218-1755 tere/day (20-25cal/Kg per dosing wt 50.4Kg with consideration for intubation)  71-80 Gm protein/day (1.4-1.6Gm/Kg per dosing wt 50.4Kg with consideration for pressure injuries)    Estimated calorie needs (8/14) for intubated pt per [place] State Equation (PSU) 2003b: 1253cal/day (25cal/Kg per dosing wt 50.4Kg)     Previous Nutrition Diagnosis:    [X ] Increased Nutrient Needs     Nutrition Diagnosis is [X ] ongoing; being addressed with EN at goal     New Nutrition Diagnosis: [ X] not applicable    Interventions:     Recommend:  1) Continue Pivot1.5 via NGT @ 30ml/hr x 24 hours to provide 720ml formula, 1080cal/day, 68Gm protein/day, 546ml free water; meets 21cal/Kg and 1.7Gm protein/Kg per dosing wt 50.4Kg, with consideration for intubation and pressure injuries  2) Continue micronutrient supplementation as medically appropriate   3) Monitor weight, lab values, skin, nutrition provision and GI tolerance    Monitoring and Evaluation:   Follow up per protocol  RD to remain available for further nutritional interventions as indicated.   Cyndi Haddad, MS RD N [facility], #[phone].

## 2018-08-14 NOTE — PROGRESS NOTE ADULT - SUBJECTIVE AND OBJECTIVE BOX
---___---___---___---___---___---___ ---___---___---___---___---___---___---___---___---___---                    <<<  M E D I C A L   A T T E N D I N G    F O L L O W    U P   N O T E  >>>  still intubated was not able to be weaned off of vent yet      ---___---___---___---___---___---      <<<  MEDICATIONS:  >>>    MEDICATIONS  (STANDING):  ALBUTerol/ipratropium for Nebulization 3 milliLiter(s) Nebulizer every 6 hours  ascorbic acid 500 milliGRAM(s) Oral daily  buDESOnide   0.5 milliGRAM(s) Respule 0.5 milliGRAM(s) Inhalation every 12 hours  chlorhexidine 0.12% Liquid 15 milliLiter(s) Swish and Spit <User Schedule>  chlorhexidine 4% Liquid 1 Application(s) Topical <User Schedule>  clonazePAM Tablet 2 milliGRAM(s) Oral at bedtime  enoxaparin Injectable 40 milliGRAM(s) SubCutaneous daily  famotidine    Tablet 20 milliGRAM(s) Oral daily  FIRST- Mouthwash  BLM 5 milliLiter(s) Swish and Spit daily  fluconAZOLE IVPB 100 milliGRAM(s) IV Intermittent every 24 hours  insulin lispro (HumaLOG) corrective regimen sliding scale   SubCutaneous every 4 hours  iron sucrose IVPB 100 milliGRAM(s) IV Intermittent every 24 hours  lidocaine   Patch 1 Patch Transdermal every 24 hours  melatonin 3 milliGRAM(s) Oral <User Schedule>  meropenem  IVPB 1000 milliGRAM(s) IV Intermittent every 8 hours  mirtazapine 45 milliGRAM(s) Oral <User Schedule>  multivitamin/minerals 1 Tablet(s) Oral daily  predniSONE   Tablet 10 milliGRAM(s) Oral every 24 hours  QUEtiapine 50 milliGRAM(s) Oral <User Schedule>  sertraline 50 milliGRAM(s) Oral daily  sodium chloride 0.9%. 1000 milliLiter(s) (10 mL/Hr) IV Continuous <Continuous>      MEDICATIONS  (PRN):  oxyCODONE    Solution 5 milliGRAM(s) Enteral Tube every 4 hours PRN Severe Pain (7 - 10)       ---___---___---___---___---___---     <<<REVIEW OF SYSTEM: >>>    GEN: no fever, no chills, no pain  RESP: no SOB, no cough, no sputum  CVS: no chest pain, no palpitations, no edema  GI: no abdominal pain, no nausea, no vomiting, no constipation, no diarrhea  : no dysurea, no frequency  NEURO: no headache, no dizziness  PSYCH: no depression, not anxious  Derm : no itching, no rash     ---___---___---___---___---___---          <<<  VITAL SIGNS: >>>    T(F): 98.9 (08-14-18 @ 15:00), Max: 99.9 (08-14-18 @ 00:00)  HR: 102 (08-14-18 @ 17:00) (94 - 111)  BP: 122/61 (08-14-18 @ 17:00) (101/51 - 152/67)  RR: 25 (08-14-18 @ 17:00) (19 - 38)  SpO2: 100% (08-14-18 @ 17:00) (97% - 100%)  Wt(kg): --  CAPILLARY BLOOD GLUCOSE      POCT Blood Glucose.: 182 mg/dL (14 Aug 2018 17:13)    I&O's Summary    13 Aug 2018 07:01  -  14 Aug 2018 07:00  --------------------------------------------------------  IN: 1445 mL / OUT: 1055 mL / NET: 390 mL    14 Aug 2018 07:01  -  14 Aug 2018 17:35  --------------------------------------------------------  IN: 770 mL / OUT: 385 mL / NET: 385 mL         ---___---___---___---___---___---                       PHYSICAL EXAM:    GEN: A&O X 2 , NAD , comfortable  HEENT: NCAT, PERRL, MMM, no scleral icterus, hearing intact ngt noted   NECK: Supple, No JVD  CVS: S1S2 , regular , No M/R/G appreciated  PULM: CTA B/L,  no W/R/R appreciated intubated  ABD.: soft. non tender, non distended,  bowel sounds present  Extrem: intact pulses , no edema noted  Derm: No rash or ecchymosis noted  PSYCH: normal mood, no depression, not anxious     ---___---___---___---___---___---     <<<  LAB AND IMAGING: >>>                          7.8    9.0   )-----------( 323      ( 14 Aug 2018 03:15 )             24.8               08-14    136  |  102  |  48<H>  ----------------------------<  184<H>  4.7   |  23  |  1.01    Ca    7.6<L>      14 Aug 2018 03:15  Phos  3.7     08-14  Mg     2.1     08-14    TPro  4.5<L>  /  Alb  1.5<L>  /  TBili  0.2  /  DBili  <0.1  /  AST  17  /  ALT  22  /  AlkPhos  136<H>  08-13                       ABG - ( 14 Aug 2018 02:53 )  pH, Arterial: 7.48  pH, Blood: x     /  pCO2: 33    /  pO2: 179   / HCO3: 24    / Base Excess: 1.3   /  SaO2: 100                     [All pertinent / recent available Imaging reports and other labs reviewed]     ---___---___---___---___---___---___ ---___---___---___---___---           <<<  A S S E S S M E N T   A N D   P L A N :  >>>          -GI/DVT Prophylaxis.    --------------------------------------------  Case discussed with   Education given on     >>______________________<<      Deniz Bolden .         phone   [phone]

## 2018-08-14 NOTE — PROGRESS NOTE ADULT - ASSESSMENT
ASSESSMENT:  68 year old female presenting with septic shock from MRSA bacteremia secondary to infected right hemiarthroplasty hardware s/p right hip I&D, explantation of right hemiarthroplasty, placement of antibiotic spacer, and right femur ORIF.    PLAN:    Neuro: acute post-op pain, anxiety, depression, dementia, intubated  - Monitor mental status.   - Home regimen of Klonopin, Zoloft, Seroquel, Remeron, and melatonin for agitation related to her dementia.  - Pain control with Tylenol, oxycodone, and Dilaudid.  - Sedation with propofol while intubated.    Resp: acute respiratory distress, asthma, possible PNA  - Monitor pulse oximeter and ABGs.  - Mechanical ventilation for acute respiratory failure. Patient has been reintubated 3 times this hospitalization so will likely need tracheostomy.  - Home prednisone 10 mg daily, Pulmicort, and Duoneb for asthma.    CV: distributive shock (resolved)  - Monitor vital signs.    GI: no acute issues  - NPO with Pivot at goal of 30 mL/hr.  - Famotidine for GERD.  - Bowel regimen with Colace & senna.    Renal: CKD stage 2, hyponatremia  - Monitor I&Os.  - Monitor electrolytes and replete as necessary.  - Assess need for diuresis.    Heme: no acute issues  - Lovenox for VTE prophylaxis.    ID: MRSA bacteremia from infected right hip, Candiduria, possible PNA  - Monitor WBC, temperature, and procalcitonin.  - Reculture as clinically indicated.  - Vancomycin and meropenem for MRSA bacteremia and possible PNA. Diflucan for Candiduria. Appreciate ID recommendations.  - Awaiting PICC placement for long-term IV antibiotics.    Endo: hyperglycemia  - Monitor fingersticks q4hrs for hypoglycemia.    Disposition:  - Full code.  - Will remain in SICU for respiratory monitoring.    Cynthia Wilkins PA-C [id number]

## 2018-08-15 DIAGNOSIS — A41.9 SEPSIS, UNSPECIFIED ORGANISM: ICD-10-CM

## 2018-08-15 LAB
ANION GAP SERPL CALC-SCNC: 12 MMOL/L — SIGNIFICANT CHANGE UP (ref 5–17)
ANION GAP SERPL CALC-SCNC: 14 MMOL/L — SIGNIFICANT CHANGE UP (ref 5–17)
BUN SERPL-MCNC: 42 MG/DL — HIGH (ref 7–23)
BUN SERPL-MCNC: 45 MG/DL — HIGH (ref 7–23)
CALCIUM SERPL-MCNC: 7.7 MG/DL — LOW (ref 8.4–10.5)
CALCIUM SERPL-MCNC: 8 MG/DL — LOW (ref 8.4–10.5)
CHLORIDE SERPL-SCNC: 106 MMOL/L — SIGNIFICANT CHANGE UP (ref 96–108)
CHLORIDE SERPL-SCNC: 108 MMOL/L — SIGNIFICANT CHANGE UP (ref 96–108)
CO2 SERPL-SCNC: 20 MMOL/L — LOW (ref 22–31)
CO2 SERPL-SCNC: 23 MMOL/L — SIGNIFICANT CHANGE UP (ref 22–31)
CREAT SERPL-MCNC: 0.79 MG/DL — SIGNIFICANT CHANGE UP (ref 0.5–1.3)
CREAT SERPL-MCNC: 0.95 MG/DL — SIGNIFICANT CHANGE UP (ref 0.5–1.3)
GAS PNL BLDA: SIGNIFICANT CHANGE UP
GAS PNL BLDA: SIGNIFICANT CHANGE UP
GLUCOSE SERPL-MCNC: 157 MG/DL — HIGH (ref 70–99)
GLUCOSE SERPL-MCNC: 158 MG/DL — HIGH (ref 70–99)
HCT VFR BLD CALC: 22.7 % — LOW (ref 34.5–45)
HGB BLD-MCNC: 7.4 G/DL — LOW (ref 11.5–15.5)
MAGNESIUM SERPL-MCNC: 1.9 MG/DL — SIGNIFICANT CHANGE UP (ref 1.6–2.6)
MAGNESIUM SERPL-MCNC: 2.2 MG/DL — SIGNIFICANT CHANGE UP (ref 1.6–2.6)
MCHC RBC-ENTMCNC: 29.8 PG — SIGNIFICANT CHANGE UP (ref 27–34)
MCHC RBC-ENTMCNC: 32.6 GM/DL — SIGNIFICANT CHANGE UP (ref 32–36)
MCV RBC AUTO: 91.3 FL — SIGNIFICANT CHANGE UP (ref 80–100)
PHOSPHATE SERPL-MCNC: 2.3 MG/DL — LOW (ref 2.5–4.5)
PHOSPHATE SERPL-MCNC: 3.7 MG/DL — SIGNIFICANT CHANGE UP (ref 2.5–4.5)
PLATELET # BLD AUTO: 371 K/UL — SIGNIFICANT CHANGE UP (ref 150–400)
POTASSIUM SERPL-MCNC: 4.4 MMOL/L — SIGNIFICANT CHANGE UP (ref 3.5–5.3)
POTASSIUM SERPL-MCNC: 4.7 MMOL/L — SIGNIFICANT CHANGE UP (ref 3.5–5.3)
POTASSIUM SERPL-SCNC: 4.4 MMOL/L — SIGNIFICANT CHANGE UP (ref 3.5–5.3)
POTASSIUM SERPL-SCNC: 4.7 MMOL/L — SIGNIFICANT CHANGE UP (ref 3.5–5.3)
RBC # BLD: 2.49 M/UL — LOW (ref 3.8–5.2)
RBC # FLD: 14.3 % — SIGNIFICANT CHANGE UP (ref 10.3–14.5)
SODIUM SERPL-SCNC: 140 MMOL/L — SIGNIFICANT CHANGE UP (ref 135–145)
SODIUM SERPL-SCNC: 143 MMOL/L — SIGNIFICANT CHANGE UP (ref 135–145)
VANCOMYCIN FLD-MCNC: 26.5 UG/ML
WBC # BLD: 7.5 K/UL — SIGNIFICANT CHANGE UP (ref 3.8–10.5)
WBC # FLD AUTO: 7.5 K/UL — SIGNIFICANT CHANGE UP (ref 3.8–10.5)

## 2018-08-15 PROCEDURE — 71045 X-RAY EXAM CHEST 1 VIEW: CPT | Mod: 26,77

## 2018-08-15 PROCEDURE — 93010 ELECTROCARDIOGRAM REPORT: CPT

## 2018-08-15 PROCEDURE — 71045 X-RAY EXAM CHEST 1 VIEW: CPT | Mod: 26,76

## 2018-08-15 PROCEDURE — 99291 CRITICAL CARE FIRST HOUR: CPT

## 2018-08-15 RX ORDER — OXYCODONE HYDROCHLORIDE 5 MG/1
5 TABLET ORAL EVERY 4 HOURS
Qty: 0 | Refills: 0 | Status: DISCONTINUED | OUTPATIENT
Start: 2018-08-15 | End: 2018-08-16

## 2018-08-15 RX ORDER — HYDROMORPHONE HYDROCHLORIDE 2 MG/ML
0.25 INJECTION INTRAMUSCULAR; INTRAVENOUS; SUBCUTANEOUS ONCE
Qty: 0 | Refills: 0 | Status: DISCONTINUED | OUTPATIENT
Start: 2018-08-15 | End: 2018-08-15

## 2018-08-15 RX ORDER — ACETAMINOPHEN 500 MG
650 TABLET ORAL EVERY 6 HOURS
Qty: 0 | Refills: 0 | Status: DISCONTINUED | OUTPATIENT
Start: 2018-08-15 | End: 2018-08-27

## 2018-08-15 RX ORDER — POTASSIUM CHLORIDE 20 MEQ
20 PACKET (EA) ORAL
Qty: 0 | Refills: 0 | Status: DISCONTINUED | OUTPATIENT
Start: 2018-08-15 | End: 2018-08-15

## 2018-08-15 RX ORDER — CLONAZEPAM 1 MG
2 TABLET ORAL AT BEDTIME
Qty: 0 | Refills: 0 | Status: DISCONTINUED | OUTPATIENT
Start: 2018-08-15 | End: 2018-08-22

## 2018-08-15 RX ORDER — INSULIN LISPRO 100/ML
VIAL (ML) SUBCUTANEOUS EVERY 6 HOURS
Qty: 0 | Refills: 0 | Status: DISCONTINUED | OUTPATIENT
Start: 2018-08-15 | End: 2018-08-19

## 2018-08-15 RX ORDER — VANCOMYCIN HCL 1 G
750 VIAL (EA) INTRAVENOUS EVERY 12 HOURS
Qty: 0 | Refills: 0 | Status: DISCONTINUED | OUTPATIENT
Start: 2018-08-16 | End: 2018-08-16

## 2018-08-15 RX ORDER — MAGNESIUM SULFATE 500 MG/ML
2 VIAL (ML) INJECTION ONCE
Qty: 0 | Refills: 0 | Status: COMPLETED | OUTPATIENT
Start: 2018-08-15 | End: 2018-08-15

## 2018-08-15 RX ADMIN — DIPHENHYDRAMINE HYDROCHLORIDE AND LIDOCAINE HYDROCHLORIDE AND ALUMINUM HYDROXIDE AND MAGNESIUM HYDRO 5 MILLILITER(S): KIT at 12:40

## 2018-08-15 RX ADMIN — Medication 3 MILLILITER(S): at 11:50

## 2018-08-15 RX ADMIN — OXYCODONE HYDROCHLORIDE 5 MILLIGRAM(S): 5 TABLET ORAL at 11:37

## 2018-08-15 RX ADMIN — HYDROMORPHONE HYDROCHLORIDE 0.25 MILLIGRAM(S): 2 INJECTION INTRAMUSCULAR; INTRAVENOUS; SUBCUTANEOUS at 18:15

## 2018-08-15 RX ADMIN — Medication 3 MILLILITER(S): at 18:03

## 2018-08-15 RX ADMIN — MEROPENEM 100 MILLIGRAM(S): 1 INJECTION INTRAVENOUS at 14:32

## 2018-08-15 RX ADMIN — Medication 3 MILLILITER(S): at 23:46

## 2018-08-15 RX ADMIN — CHLORHEXIDINE GLUCONATE 1 APPLICATION(S): 213 SOLUTION TOPICAL at 05:08

## 2018-08-15 RX ADMIN — MEROPENEM 100 MILLIGRAM(S): 1 INJECTION INTRAVENOUS at 21:40

## 2018-08-15 RX ADMIN — Medication 1 TABLET(S): at 12:41

## 2018-08-15 RX ADMIN — Medication 3 MILLIGRAM(S): at 21:40

## 2018-08-15 RX ADMIN — OXYCODONE HYDROCHLORIDE 5 MILLIGRAM(S): 5 TABLET ORAL at 12:07

## 2018-08-15 RX ADMIN — Medication 50 GRAM(S): at 05:38

## 2018-08-15 RX ADMIN — LIDOCAINE 1 PATCH: 4 CREAM TOPICAL at 22:43

## 2018-08-15 RX ADMIN — IRON SUCROSE 210 MILLIGRAM(S): 20 INJECTION, SOLUTION INTRAVENOUS at 15:47

## 2018-08-15 RX ADMIN — MEROPENEM 100 MILLIGRAM(S): 1 INJECTION INTRAVENOUS at 05:09

## 2018-08-15 RX ADMIN — FLUCONAZOLE 50 MILLIGRAM(S): 150 TABLET ORAL at 10:53

## 2018-08-15 RX ADMIN — LIDOCAINE 1 PATCH: 4 CREAM TOPICAL at 11:37

## 2018-08-15 RX ADMIN — QUETIAPINE FUMARATE 50 MILLIGRAM(S): 200 TABLET, FILM COATED ORAL at 21:40

## 2018-08-15 RX ADMIN — Medication 3 MILLILITER(S): at 05:17

## 2018-08-15 RX ADMIN — FAMOTIDINE 20 MILLIGRAM(S): 10 INJECTION INTRAVENOUS at 12:40

## 2018-08-15 RX ADMIN — CHLORHEXIDINE GLUCONATE 15 MILLILITER(S): 213 SOLUTION TOPICAL at 05:08

## 2018-08-15 RX ADMIN — SERTRALINE 50 MILLIGRAM(S): 25 TABLET, FILM COATED ORAL at 12:40

## 2018-08-15 RX ADMIN — Medication 0.5 MILLIGRAM(S): at 05:17

## 2018-08-15 RX ADMIN — Medication 0.5 MILLIGRAM(S): at 18:03

## 2018-08-15 RX ADMIN — SODIUM CHLORIDE 10 MILLILITER(S): 9 INJECTION INTRAMUSCULAR; INTRAVENOUS; SUBCUTANEOUS at 15:47

## 2018-08-15 RX ADMIN — Medication 1: at 17:52

## 2018-08-15 RX ADMIN — ENOXAPARIN SODIUM 40 MILLIGRAM(S): 100 INJECTION SUBCUTANEOUS at 12:40

## 2018-08-15 RX ADMIN — Medication 500 MILLIGRAM(S): at 12:41

## 2018-08-15 RX ADMIN — HYDROMORPHONE HYDROCHLORIDE 0.25 MILLIGRAM(S): 2 INJECTION INTRAMUSCULAR; INTRAVENOUS; SUBCUTANEOUS at 18:45

## 2018-08-15 RX ADMIN — Medication 2 MILLIGRAM(S): at 21:40

## 2018-08-15 RX ADMIN — Medication 10 MILLIGRAM(S): at 11:37

## 2018-08-15 RX ADMIN — SODIUM CHLORIDE 10 MILLILITER(S): 9 INJECTION INTRAMUSCULAR; INTRAVENOUS; SUBCUTANEOUS at 05:08

## 2018-08-15 RX ADMIN — MIRTAZAPINE 45 MILLIGRAM(S): 45 TABLET, ORALLY DISINTEGRATING ORAL at 21:40

## 2018-08-15 NOTE — PROGRESS NOTE ADULT - ASSESSMENT
68F s/p R hip PJI explant and placement of cement spacer w/ ORIF distal femur    Pain control  DVT ppx lovenox  FU Cultures  Abx per ID  NWB LLE  FFWB RLE  PT/OT  poss extubation today, if fails that, then trach      Ortho [id number]

## 2018-08-15 NOTE — PROGRESS NOTE ADULT - ASSESSMENT
ASSESSMENT:  68 year old female presenting with septic shock from MRSA bacteremia secondary to infected right hemiarthroplasty hardware s/p right hip I&D, explantation of right hemiarthroplasty, placement of antibiotic spacer, and right femur ORIF.    PLAN:    Neuro: acute post-op pain, anxiety, depression, dementia, intubated  - Monitor mental status.   - Home regimen of Klonopin, Zoloft, Seroquel, Remeron, and melatonin for agitation related to her dementia.  - Pain control with Tylenol, oxycodone, and Dilaudid.  - Sedation with propofol while intubated.    Resp: acute respiratory distress, asthma, possible PNA  - Monitor pulse oximeter and ABGs.  - Mechanical ventilation for acute respiratory failure. Patient has been reintubated 3 times this hospitalization so will likely need tracheostomy.  - Home prednisone 10 mg daily, Pulmicort, and Duoneb for asthma.    CV: distributive shock (resolved)  - Monitor vital signs.    GI: no acute issues  - NPO with Pivot at goal of 30 mL/hr.  - Famotidine for GERD.  - Bowel regimen with Colace & senna.    Renal: CKD stage 2, hyponatremia  - Monitor I&Os.  - Monitor electrolytes and replete as necessary.  - Assess need for diuresis.    Heme: no acute issues  - Lovenox for VTE prophylaxis.    ID: MRSA bacteremia from infected right hip, Candiduria, possible PNA, s/p PICC placement for op abx  - Monitor WBC, temperature, and procalcitonin.  - Reculture as clinically indicated.  - Vancomycin and meropenem for MRSA bacteremia and possible PNA. Diflucan for Candiduria. Appreciate ID recommendations.      Endo: hyperglycemia  - Monitor fingersticks q4hrs for hypoglycemia.    Disposition:  - Full code.  - Will remain in SICU for respiratory monitoring.    DEE Centeno PGY2 [id number]

## 2018-08-15 NOTE — AIRWAY REMOVAL NOTE  ADULT & PEDS - REASON ARTIFICAL AIRWAY REMOVED:
reason for artificial airway has resolved

## 2018-08-15 NOTE — PROGRESS NOTE ADULT - SUBJECTIVE AND OBJECTIVE BOX
---___---___---___---___---___---___ ---___---___---___---___---___---___---___---___---___---                    <<<  M E D I C A L   A T T E N D I N G    F O L L O W    U P   N O T E  >>>  now extubated laying in chair doing well no distress     ---___---___---___---___---___---      <<<  MEDICATIONS:  >>>    MEDICATIONS  (STANDING):  ALBUTerol/ipratropium for Nebulization 3 milliLiter(s) Nebulizer every 6 hours  ascorbic acid 500 milliGRAM(s) Oral daily  buDESOnide   0.5 milliGRAM(s) Respule 0.5 milliGRAM(s) Inhalation every 12 hours  chlorhexidine 4% Liquid 1 Application(s) Topical <User Schedule>  clonazePAM Tablet 2 milliGRAM(s) Oral at bedtime  enoxaparin Injectable 40 milliGRAM(s) SubCutaneous daily  famotidine    Tablet 20 milliGRAM(s) Oral daily  FIRST- Mouthwash  BLM 5 milliLiter(s) Swish and Spit daily  fluconAZOLE IVPB 100 milliGRAM(s) IV Intermittent every 24 hours  insulin lispro (HumaLOG) corrective regimen sliding scale   SubCutaneous every 6 hours  iron sucrose IVPB 100 milliGRAM(s) IV Intermittent every 24 hours  lidocaine   Patch 1 Patch Transdermal every 24 hours  melatonin 3 milliGRAM(s) Oral <User Schedule>  meropenem  IVPB 1000 milliGRAM(s) IV Intermittent every 8 hours  mirtazapine 45 milliGRAM(s) Oral <User Schedule>  multivitamin/minerals 1 Tablet(s) Oral daily  predniSONE   Tablet 10 milliGRAM(s) Oral every 24 hours  QUEtiapine 50 milliGRAM(s) Oral <User Schedule>  sertraline 50 milliGRAM(s) Oral daily  sodium chloride 0.9%. 1000 milliLiter(s) (10 mL/Hr) IV Continuous <Continuous>      MEDICATIONS  (PRN):  oxyCODONE    Solution 5 milliGRAM(s) Enteral Tube every 4 hours PRN Severe Pain (7 - 10)       ---___---___---___---___---___---     <<<REVIEW OF SYSTEM: >>>    GEN: no fever, no chills, no pain  RESP: no SOB, no cough, no sputum  CVS: no chest pain, no palpitations, no edema  GI: no abdominal pain, no nausea, no vomiting, no constipation, no diarrhea  : no dysurea, no frequency  NEURO: no headache, no dizziness  PSYCH: no depression, not anxious  Derm : no itching, no rash     ---___---___---___---___---___---          <<<  VITAL SIGNS: >>>    T(F): 99.3 (08-15-18 @ 08:00), Max: 99.3 (08-15-18 @ 08:00)  HR: 112 (08-15-18 @ 13:00) (91 - 112)  BP: 159/73 (08-15-18 @ 13:00) (107/77 - 166/90)  RR: 27 (08-15-18 @ 13:00) (17 - 32)  SpO2: 100% (08-15-18 @ 13:00) (92% - 100%)  Wt(kg): --  CAPILLARY BLOOD GLUCOSE      POCT Blood Glucose.: 132 mg/dL (15 Aug 2018 05:05)    I&O's Summary    14 Aug 2018 07:01  -  15 Aug 2018 07:00  --------------------------------------------------------  IN: 1670 mL / OUT: 1170 mL / NET: 500 mL    15 Aug 2018 07:01  -  15 Aug 2018 15:52  --------------------------------------------------------  IN: 140 mL / OUT: 350 mL / NET: -210 mL         ---___---___---___---___---___---                       PHYSICAL EXAM:    GEN: A&O X 3 , NAD , comfortable  HEENT: NCAT, PERRL, MMM, no scleral icterus, hearing intact  NECK: Supple, No JVD  CVS: S1S2 , regular , No M/R/G appreciated  PULM: CTA B/L,  no W/R/R appreciated  ABD.: soft. non tender, non distended,  bowel sounds present  Extrem: intact pulses , no edema noted  Derm: No rash or ecchymosis noted  PSYCH: normal mood, no depression, not anxious     ---___---___---___---___---___---     <<<  LAB AND IMAGING: >>>                          7.4    7.5   )-----------( 371      ( 15 Aug 2018 02:38 )             22.7               08-15    143  |  108  |  42<H>  ----------------------------<  157<H>  4.4   |  23  |  0.79    Ca    7.7<L>      15 Aug 2018 15:11  Phos  2.3     08-15  Mg     2.2     08-15                         ABG - ( 15 Aug 2018 10:48 )  pH, Arterial: 7.45  pH, Blood: x     /  pCO2: 37    /  pO2: 148   / HCO3: 25    / Base Excess: 1.7   /  SaO2: 100                     [All pertinent / recent available Imaging reports and other labs reviewed]     ---___---___---___---___---___---___ ---___---___---___---___---           <<<  A S S E S S M E N T   A N D   P L A N :  >>>          -GI/DVT Prophylaxis.    --------------------------------------------  Case discussed with    Education given on     >>______________________<<      Deniz Bolden .         phone   [phone]

## 2018-08-15 NOTE — PROGRESS NOTE ADULT - SUBJECTIVE AND OBJECTIVE BOX
Ortho Progress Note    S: Patient seen and examined. No acute events overnight. per sicu, will try to extubate today, if not will need trach      O:  Physical Exam:  Gen: responds to commands  Resp: intubated  Ext: EHL/FHL/TA/Sol intact          + SILT DP/SP/REED/Sa          +DP, extremity WWP  aquacell c/d/i    Vital Signs Last 24 Hrs  T(C): 37.2 (15 Aug 2018 04:00), Max: 37.2 (14 Aug 2018 15:00)  T(F): 98.9 (15 Aug 2018 04:00), Max: 98.9 (14 Aug 2018 15:00)  HR: 92 (15 Aug 2018 06:00) (91 - 107)  BP: 150/65 (15 Aug 2018 06:00) (109/56 - 166/90)  BP(mean): 94 (15 Aug 2018 06:00) (79 - 121)  RR: 18 (15 Aug 2018 06:00) (17 - 38)  SpO2: 100% (15 Aug 2018 06:00) (92% - 100%)                          7.4    7.5   )-----------( 371      ( 15 Aug 2018 02:38 )             22.7                         7.8    9.0   )-----------( 323      ( 14 Aug 2018 03:15 )             24.8       08-15    140  |  106  |  45<H>  ----------------------------<  158<H>  4.7   |  20<L>  |  0.95

## 2018-08-15 NOTE — AIRWAY REMOVAL NOTE  ADULT & PEDS - ARTIFICAL AIRWAY REMOVAL COMMENTS
Written order for extubation verified. The patient was identified by full name and birth date compared to the identification band. Present during the procedure was EDISON Lakhani.
pt extubated to a highflow nasal cannula
Written order for extubation verified. The patient was identified by full name and birth date compared to the identification band. Present during the procedure was Charo Centeno
no

## 2018-08-15 NOTE — PROGRESS NOTE ADULT - ATTENDING COMMENTS
Awake ansd alert  Met criteria for extubation s/p extubated tolerating well  Long course  abx  Not on pressure  Fe deficiency anemia on IV Fe   kept updated

## 2018-08-15 NOTE — AIRWAY REMOVAL NOTE  ADULT & PEDS - RESPIRATORY EXPANSION/ACCESSORY MUSCLES/RETRACTIONS
expansion symmetric
expansion symmetric/no retractions/no use of accessory muscles
no retractions/no use of accessory muscles/expansion symmetric

## 2018-08-15 NOTE — PROGRESS NOTE ADULT - SUBJECTIVE AND OBJECTIVE BOX
CC: f/u for MRSA bacteremia and infected hip    Patient reports: she is sedated on vent, alert and cooperative, still on vent, FIO2 30%, PEEP 5    REVIEW OF SYSTEMS:  All other review of systems negative (Comprehensive ROS); limited by condition    Antimicrobials Day #  fluconazole day 3, meropenem day 6, vanco day 11  fluconAZOLE IVPB 100 milliGRAM(s) IV Intermittent every 24 hours  meropenem  IVPB 1000 milliGRAM(s) IV Intermittent every 8 hours    Other Medications Reviewed    T(F): 98.9 (08-15-18 @ 04:00), Max: 98.9 (08-14-18 @ 15:00)  HR: 97 (08-15-18 @ 09:03)  BP: 107/77 (08-15-18 @ 09:00)  RR: 29 (08-15-18 @ 09:00)  SpO2: 100% (08-15-18 @ 09:03)  Wt(kg): --    PHYSICAL EXAM:  General: alert, no acute distress, on vent  Eyes:  anicteric, no conjunctival injection, no discharge  Oropharynx: ETT	  Neck: supple, without adenopathy  Lungs: clear to auscultation, diminished at bases  Heart: regular rate and rhythm; no murmur, rubs or gallops  Abdomen: soft, nondistended, nontender, without mass or organomegaly  Skin: no lesions  Extremities: no clubbing, cyanosis,trace edema  Neurologic: alert, oriented, moves all extremities  Rt hip dressing is dry, RTIJ removed,Left arm PICC line placed  LAB RESULTS:                        7.4    7.5   )-----------( 371      ( 15 Aug 2018 02:38 )             22.7     08-15    140  |  106  |  45<H>  ----------------------------<  158<H>  4.7   |  20<L>  |  0.95    Ca    8.0<L>      15 Aug 2018 02:38  Phos  3.7     08-15  Mg     1.9     08-15      vanco level is 26    MICROBIOLOGY:  RECENT CULTURES:  08-12 @ 05:20 .Urine Catheterized     <10,000 CFU/ml  Normal Urogenital vince present      08-12 @ 00:16 .Blood Blood     No growth to date.          RADIOLOGY REVIEWED:    < from: Xray Chest 1 View- PORTABLE-Urgent (08.14.18 @ 14:19) >  INTERPRETATION:     Heart size and the mediastinum cannot be accurately evaluated on this   projection.  ET tube tip isapproximately 2.6 cm above the stoney. Tip of enteric tube   is in the gastric fundus pointing superiorly and medially. Right IJ line   unchanged.  New left upper extremity PICC line with tip in the SVC right atrial   junction. No retained guidewire or pneumothorax although the superiormost   apices are excluded from the image.  Included lungs appear clear. No pleural effusion seen.            IMPRESSION:  Left upper extremity PICC line with tip in the SVC right   atrial junction. No pneumothoraxseen although the superiormost apices   are excluded from the image.    Other lines and tubes as above.    < end of copied text >

## 2018-08-15 NOTE — PROGRESS NOTE ADULT - ASSESSMENT
69 yo female with dementia, history of UC, and s/p RT Hip hemiarthroplasty 6/22/18  postsurgical hematoma with secondary infection  Admitted with low grade fever, rigors, increased confusion, found to have leukocytosis, 20% bands, ICU, pressor use   Bld Cxs 7/26-8/1 still positive all MRSA  s/p R hip I&D, lavage, poly exchange- pus encountered, all specimens S aureus.Initial surgery 7/27, repeat debridement 8/4, now with spacer  Afebrile, Creat stable  Dapto and ceftaroline used for a short time,concerns of rash led to dapto and rifampin, fever led to switch to vanco as sole MRSA agent  GAYATHRI: no evidence of valvular vegetation  s/p return to OR, removal of hardware/spacer,  perhaps effective source control on 8/4  leukocytosis has resolved, etiology of recent  fevers not clear, have moderated  Candida in urine is likely a colonizer, no pressing need to treat it.  Meropenem use is empiric for possible GNR infection, although CXR has been clear, scant ET secretions, and urine has grown yeast on 8/9 and less than 10,000 on 8/12  Plan:  1continue vanco for MRSA, now on hold due to level of 26, will advise restarting when level less than 15  2.Meropenem is empiric, today is day 6, favor limiting to 6-7 days  3.wean per SICU, fluconazole per SICU, favor limiting to 5 days  4.? If weanable or if trach will be needed

## 2018-08-15 NOTE — AIRWAY REMOVAL NOTE  ADULT & PEDS - RESPIRATORY RHYTHM/PATTERN
unlabored/pattern regular/rate regular/depth regular
depth regular/pattern regular/unlabored/no shortness of breath/rate regular

## 2018-08-15 NOTE — PROGRESS NOTE ADULT - SUBJECTIVE AND OBJECTIVE BOX
HISTORY: 68 year old female with a past medical history of asthma on steroids, CVA (no residual deficits), pulmonary HTN, HLD, GERD, ulcerative colitis, fatty pancreas, impaired glucose tolerance, anxiety, and depression who presented on 7/26/2018 with altered mental status, rigors, and urinary frequency. Of note, patient was recently hospitalized for a right femoral neck fracture secondary to osteoporosis s/p hemiarthroplasty on 6/22/2018. She had been taking Plavix for her history of CVA and Lovenox for VTE prophylaxis but she developed a hematoma at her surgical site so the Lovenox was stopped. Since then, patient has been progressively more agitated, confused, and weak. Additionally, patient has been complaining of new onset low back pain and left hip pain. In the ED, patient was hemodynamically stable but noted to be hypoglycemic. She was also started on meropenem & vancomycin for concern of sepsis. CT scan revealed a 17 cm collection adjacent to the right hip prosthesis, an acute L2 compression fracture, and new left acetabular fracture. Patient was admitted to medicine but on 7/27/2018, patient was noted to be more altered, rigorous, tachypneic, febrile to 101 F, and hypotensive w/ SBP in the 60s so an RRT was called. She was more fluid and started on a norepinephrine gtt. Patient subsequently admitted to SICU for hemodynamic monitoring. Blood cultures positive for MRSA. Decision was made to take the patient to the OR on 7/27/2018 for right hip I&D and exchange of the femoral head. She returned to the SICU intubated on vasopressor support. She was extubated on 7/30/2018 but was reintubated on 8/2/2018. Additionally, patient was persistently on vasopressor support and bacteremic with MRSA. Patient was subsequently taken back to the OR on 8/4/2018 for further right hip I&D, explantation of right hemiarthroplasty, placement of antibiotic spacer, and right femur ORIF. Patient was subsequently weaned off vasopressor support on 8/5/2018 and extubated on 8/6/2018 but required reintubation for tachypnea with accessory muscle use on 8/11/2018.    24 HOUR EVENTS:  - PICC placed   - RIJ CVC removed  - Patient tolerated SBT for 1.5 hours and then became tachypneic and has to be put back on ventilation  - started on venofir with multivitamins for iron deficiency anemia      SUBJECTIVE/ROS:  [ ] A ten-point review of systems was otherwise negative except as noted.  [x ] Due to altered mental status/intubation, subjective information were not able to be obtained from the patient. History was obtained, to the extent possible, from review of the chart and collateral sources of information.      NEURO  RASS: -1    GCS:     CAM ICU:  Exam: awake, disoriented   Meds: clonazePAM Tablet 2 milliGRAM(s) Oral at bedtime  melatonin 3 milliGRAM(s) Oral <User Schedule>  mirtazapine 45 milliGRAM(s) Oral <User Schedule>  oxyCODONE    Solution 5 milliGRAM(s) Enteral Tube every 4 hours PRN Severe Pain (7 - 10)  QUEtiapine 50 milliGRAM(s) Oral <User Schedule>  sertraline 50 milliGRAM(s) Oral daily    [x] Adequacy of sedation and pain control has been assessed and adjusted      RESPIRATORY  RR: 22 (08-15-18 @ 04:00) (17 - 38)  SpO2: 100% (08-15-18 @ 04:27) (93% - 100%)  Wt(kg): --  Exam: unlabored, clear to auscultation bilaterally  Mechanical Ventilation: Mode: AC/ CMV (Assist Control/ Continuous Mandatory Ventilation), RR (machine): 14, RR (patient): 21, TV (machine): 400, FiO2: 30, PEEP: 5, ITime: 1, MAP: 8, PIP: 14  ABG - ( 15 Aug 2018 02:30 )  pH: 7.49  /  pCO2: 33    /  pO2: 169   / HCO3: 25    / Base Excess: 2.4   /  SaO2: 100     Lactate: x        [N/A] Extubation Readiness Assessed  Meds: ALBUTerol/ipratropium for Nebulization 3 milliLiter(s) Nebulizer every 6 hours  buDESOnide   0.5 milliGRAM(s) Respule 0.5 milliGRAM(s) Inhalation every 12 hours      CARDIOVASCULAR  HR: 96 (08-15-18 @ 04:27) (93 - 107)  BP: 163/77 (08-15-18 @ 04:00) (109/56 - 163/77)  BP(mean): 110 (08-15-18 @ 04:00) (79 - 110)  ABP: --  ABP(mean): --  Wt(kg): --  CVP(cm H2O): --      Exam: regular rate and rhythm  Cardiac Rhythm: sinus  Perfusion     [x]Adequate   [ ]Inadequate  Mentation   [x]Normal       [ ]Reduced  Extremities  [x]Warm         [ ]Cool  Volume Status [ ]Hypervolemic [x]Euvolemic [ ]Hypovolemic  Meds:       GI/NUTRITION  Exam: soft, nontender, nondistended  Diet: NPO, TF @ 30  Meds: famotidine    Tablet 20 milliGRAM(s) Oral daily      GENITOURINARY  I&O's Detail    08-13 @ 07:01  -  08-14 @ 07:00  --------------------------------------------------------  IN:    Enteral Tube Flush: 145 mL    IV PiggyBack: 450 mL    Pivot: 750 mL    Solution: 100 mL  Total IN: 1445 mL    OUT:    Indwelling Catheter - Urethral: 1055 mL  Total OUT: 1055 mL    Total NET: 390 mL      08-14 @ 07:01  -  08-15 @ 05:04  --------------------------------------------------------  IN:    Enteral Tube Flush: 380 mL    IV PiggyBack: 200 mL    Pivot: 660 mL    sodium chloride 0.9%.: 140 mL    Solution: 100 mL  Total IN: 1480 mL    OUT:    Indwelling Catheter - Urethral: 935 mL  Total OUT: 935 mL    Total NET: 545 mL          08-15    140  |  106  |  45<H>  ----------------------------<  158<H>  4.7   |  20<L>  |  0.95    Ca    8.0<L>      15 Aug 2018 02:38  Phos  3.7     08-15  Mg     1.9     08-15      [ x] Blackman catheter, indication: N/A  Meds: ascorbic acid 500 milliGRAM(s) Oral daily  iron sucrose IVPB 100 milliGRAM(s) IV Intermittent every 24 hours  multivitamin/minerals 1 Tablet(s) Oral daily  sodium chloride 0.9%. 1000 milliLiter(s) IV Continuous <Continuous>        HEMATOLOGIC  Meds: enoxaparin Injectable 40 milliGRAM(s) SubCutaneous daily    [x] VTE Prophylaxis                        7.4    7.5   )-----------( 371      ( 15 Aug 2018 02:38 )             22.7       Transfusion     [ ] PRBC   [ ] Platelets   [ ] FFP   [ ] Cryoprecipitate      INFECTIOUS DISEASES  WBC Count: 7.5 K/uL (08-15 @ 02:38)    RECENT CULTURES:  Specimen Source: .Urine Catheterized  Date/Time: 08-12 @ 05:20  Culture Results:   <10,000 CFU/ml  Normal Urogenital vince present  Gram Stain: --  Organism: --  Specimen Source: .Blood Blood  Date/Time: 08-12 @ 00:16  Culture Results:   No growth to date.  Gram Stain: --  Organism: --    Meds: fluconAZOLE IVPB 100 milliGRAM(s) IV Intermittent every 24 hours  meropenem  IVPB 1000 milliGRAM(s) IV Intermittent every 8 hours        ENDOCRINE  CAPILLARY BLOOD GLUCOSE      POCT Blood Glucose.: 169 mg/dL (14 Aug 2018 21:36)  POCT Blood Glucose.: 182 mg/dL (14 Aug 2018 17:13)  POCT Blood Glucose.: 84 mg/dL (14 Aug 2018 13:47)  POCT Blood Glucose.: 87 mg/dL (14 Aug 2018 13:46)  POCT Blood Glucose.: 159 mg/dL (14 Aug 2018 09:08)  POCT Blood Glucose.: 143 mg/dL (14 Aug 2018 05:28)    Meds: insulin lispro (HumaLOG) corrective regimen sliding scale   SubCutaneous every 4 hours  predniSONE   Tablet 10 milliGRAM(s) Oral every 24 hours    ACCESS DEVICES:  [x] Peripheral IV  [] Central Venous Line	[] R	[ ] L	[] IJ	[ ] Fem	[ ] SC	Placed:  [ ] Arterial Line		[ ] R	[ ] L	[ ] Fem	[ ] Rad	[ ] Ax	Placed:   [x ] PICC:					[ ] Mediport  [x] Urinary Catheter, Date Placed: 7/26/2018  [x] Necessity of urinary, arterial, and venous catheters discussed    OTHER MEDICATIONS:  - chlorhexidine 0.12% Liquid 15 milliLiter(s) Swish and Spit <User Schedule>  - chlorhexidine 4% Liquid 1 Application(s) Topical <User Schedule>  - FIRST- Mouthwash  BLM 5 milliLiter(s) Swish and Spit daily    CODE STATUS: Full code.    IMAGING:

## 2018-08-16 LAB
ANION GAP SERPL CALC-SCNC: 10 MMOL/L — SIGNIFICANT CHANGE UP (ref 5–17)
ANION GAP SERPL CALC-SCNC: 9 MMOL/L — SIGNIFICANT CHANGE UP (ref 5–17)
ANION GAP SERPL CALC-SCNC: 9 MMOL/L — SIGNIFICANT CHANGE UP (ref 5–17)
BASE EXCESS BLDV CALC-SCNC: -0.5 MMOL/L — SIGNIFICANT CHANGE UP (ref -2–2)
BUN SERPL-MCNC: 34 MG/DL — HIGH (ref 7–23)
BUN SERPL-MCNC: 40 MG/DL — HIGH (ref 7–23)
BUN SERPL-MCNC: 41 MG/DL — HIGH (ref 7–23)
CA-I BLD-SCNC: 1.19 MMOL/L — SIGNIFICANT CHANGE UP (ref 1.12–1.3)
CA-I SERPL-SCNC: 1.02 MMOL/L — LOW (ref 1.12–1.3)
CALCIUM SERPL-MCNC: 6.5 MG/DL — CRITICAL LOW (ref 8.4–10.5)
CALCIUM SERPL-MCNC: 7.6 MG/DL — LOW (ref 8.4–10.5)
CALCIUM SERPL-MCNC: 8.2 MG/DL — LOW (ref 8.4–10.5)
CHLORIDE BLDV-SCNC: 90 MMOL/L — LOW (ref 96–108)
CHLORIDE SERPL-SCNC: 104 MMOL/L — SIGNIFICANT CHANGE UP (ref 96–108)
CHLORIDE SERPL-SCNC: 104 MMOL/L — SIGNIFICANT CHANGE UP (ref 96–108)
CHLORIDE SERPL-SCNC: 87 MMOL/L — LOW (ref 96–108)
CO2 BLDV-SCNC: 24 MMOL/L — SIGNIFICANT CHANGE UP (ref 22–30)
CO2 SERPL-SCNC: 19 MMOL/L — LOW (ref 22–31)
CO2 SERPL-SCNC: 22 MMOL/L — SIGNIFICANT CHANGE UP (ref 22–31)
CO2 SERPL-SCNC: 23 MMOL/L — SIGNIFICANT CHANGE UP (ref 22–31)
CREAT SERPL-MCNC: 0.72 MG/DL — SIGNIFICANT CHANGE UP (ref 0.5–1.3)
CREAT SERPL-MCNC: 0.87 MG/DL — SIGNIFICANT CHANGE UP (ref 0.5–1.3)
CREAT SERPL-MCNC: 0.89 MG/DL — SIGNIFICANT CHANGE UP (ref 0.5–1.3)
GAS PNL BLDV: 115 MMOL/L — CRITICAL LOW (ref 136–145)
GAS PNL BLDV: SIGNIFICANT CHANGE UP
GLUCOSE BLDV-MCNC: 812 MG/DL — CRITICAL HIGH (ref 70–99)
GLUCOSE SERPL-MCNC: 132 MG/DL — HIGH (ref 70–99)
GLUCOSE SERPL-MCNC: 189 MG/DL — HIGH (ref 70–99)
GLUCOSE SERPL-MCNC: 891 MG/DL — CRITICAL HIGH (ref 70–99)
HCO3 BLDV-SCNC: 23 MMOL/L — SIGNIFICANT CHANGE UP (ref 21–29)
HCT VFR BLD CALC: 21.9 % — LOW (ref 34.5–45)
HCT VFR BLD CALC: 26 % — LOW (ref 34.5–45)
HCT VFR BLDA CALC: 26 % — LOW (ref 39–50)
HGB BLD CALC-MCNC: 8.4 G/DL — LOW (ref 11.5–15.5)
HGB BLD-MCNC: 7.1 G/DL — LOW (ref 11.5–15.5)
HGB BLD-MCNC: 8.2 G/DL — LOW (ref 11.5–15.5)
LACTATE BLDV-MCNC: 0.7 MMOL/L — SIGNIFICANT CHANGE UP (ref 0.7–2)
MAGNESIUM SERPL-MCNC: 1.6 MG/DL — SIGNIFICANT CHANGE UP (ref 1.6–2.6)
MAGNESIUM SERPL-MCNC: 2.1 MG/DL — SIGNIFICANT CHANGE UP (ref 1.6–2.6)
MAGNESIUM SERPL-MCNC: 2.2 MG/DL — SIGNIFICANT CHANGE UP (ref 1.6–2.6)
MCHC RBC-ENTMCNC: 29.6 PG — SIGNIFICANT CHANGE UP (ref 27–34)
MCHC RBC-ENTMCNC: 29.8 PG — SIGNIFICANT CHANGE UP (ref 27–34)
MCHC RBC-ENTMCNC: 31.5 GM/DL — LOW (ref 32–36)
MCHC RBC-ENTMCNC: 32.4 GM/DL — SIGNIFICANT CHANGE UP (ref 32–36)
MCV RBC AUTO: 92.1 FL — SIGNIFICANT CHANGE UP (ref 80–100)
MCV RBC AUTO: 94.2 FL — SIGNIFICANT CHANGE UP (ref 80–100)
OTHER CELLS CSF MANUAL: 9 ML/DL — LOW (ref 18–22)
PCO2 BLDV: 34 MMHG — LOW (ref 35–50)
PH BLDV: 7.45 — SIGNIFICANT CHANGE UP (ref 7.35–7.45)
PHOSPHATE SERPL-MCNC: 2.9 MG/DL — SIGNIFICANT CHANGE UP (ref 2.5–4.5)
PHOSPHATE SERPL-MCNC: 3.5 MG/DL — SIGNIFICANT CHANGE UP (ref 2.5–4.5)
PHOSPHATE SERPL-MCNC: 3.8 MG/DL — SIGNIFICANT CHANGE UP (ref 2.5–4.5)
PLATELET # BLD AUTO: 390 K/UL — SIGNIFICANT CHANGE UP (ref 150–400)
PLATELET # BLD AUTO: 478 K/UL — HIGH (ref 150–400)
PO2 BLDV: 40 MMHG — SIGNIFICANT CHANGE UP (ref 25–45)
POTASSIUM BLDV-SCNC: 3.8 MMOL/L — SIGNIFICANT CHANGE UP (ref 3.5–5.3)
POTASSIUM SERPL-MCNC: 3.9 MMOL/L — SIGNIFICANT CHANGE UP (ref 3.5–5.3)
POTASSIUM SERPL-MCNC: 4.2 MMOL/L — SIGNIFICANT CHANGE UP (ref 3.5–5.3)
POTASSIUM SERPL-MCNC: 4.7 MMOL/L — SIGNIFICANT CHANGE UP (ref 3.5–5.3)
POTASSIUM SERPL-SCNC: 3.9 MMOL/L — SIGNIFICANT CHANGE UP (ref 3.5–5.3)
POTASSIUM SERPL-SCNC: 4.2 MMOL/L — SIGNIFICANT CHANGE UP (ref 3.5–5.3)
POTASSIUM SERPL-SCNC: 4.7 MMOL/L — SIGNIFICANT CHANGE UP (ref 3.5–5.3)
RBC # BLD: 2.38 M/UL — LOW (ref 3.8–5.2)
RBC # BLD: 2.76 M/UL — LOW (ref 3.8–5.2)
RBC # FLD: 14.3 % — SIGNIFICANT CHANGE UP (ref 10.3–14.5)
RBC # FLD: 14.4 % — SIGNIFICANT CHANGE UP (ref 10.3–14.5)
SAO2 % BLDV: 75 % — SIGNIFICANT CHANGE UP (ref 67–88)
SODIUM SERPL-SCNC: 115 MMOL/L — CRITICAL LOW (ref 135–145)
SODIUM SERPL-SCNC: 135 MMOL/L — SIGNIFICANT CHANGE UP (ref 135–145)
SODIUM SERPL-SCNC: 137 MMOL/L — SIGNIFICANT CHANGE UP (ref 135–145)
VANCOMYCIN FLD-MCNC: 18.9 UG/ML — SIGNIFICANT CHANGE UP
WBC # BLD: 6.2 K/UL — SIGNIFICANT CHANGE UP (ref 3.8–10.5)
WBC # BLD: 8.6 K/UL — SIGNIFICANT CHANGE UP (ref 3.8–10.5)
WBC # FLD AUTO: 6.2 K/UL — SIGNIFICANT CHANGE UP (ref 3.8–10.5)
WBC # FLD AUTO: 8.6 K/UL — SIGNIFICANT CHANGE UP (ref 3.8–10.5)

## 2018-08-16 PROCEDURE — 71045 X-RAY EXAM CHEST 1 VIEW: CPT | Mod: 26

## 2018-08-16 PROCEDURE — 99233 SBSQ HOSP IP/OBS HIGH 50: CPT

## 2018-08-16 PROCEDURE — 99291 CRITICAL CARE FIRST HOUR: CPT

## 2018-08-16 PROCEDURE — 71045 X-RAY EXAM CHEST 1 VIEW: CPT | Mod: 26,77

## 2018-08-16 RX ORDER — VANCOMYCIN HCL 1 G
1000 VIAL (EA) INTRAVENOUS EVERY 24 HOURS
Qty: 0 | Refills: 0 | Status: DISCONTINUED | OUTPATIENT
Start: 2018-08-16 | End: 2018-08-17

## 2018-08-16 RX ORDER — VANCOMYCIN HCL 1 G
750 VIAL (EA) INTRAVENOUS EVERY 12 HOURS
Qty: 0 | Refills: 0 | Status: DISCONTINUED | OUTPATIENT
Start: 2018-08-16 | End: 2018-08-16

## 2018-08-16 RX ORDER — FUROSEMIDE 40 MG
20 TABLET ORAL DAILY
Qty: 0 | Refills: 0 | Status: DISCONTINUED | OUTPATIENT
Start: 2018-08-16 | End: 2018-08-19

## 2018-08-16 RX ORDER — ACETYLCYSTEINE 200 MG/ML
3 VIAL (ML) MISCELLANEOUS EVERY 6 HOURS
Qty: 0 | Refills: 0 | Status: COMPLETED | OUTPATIENT
Start: 2018-08-16 | End: 2018-08-19

## 2018-08-16 RX ORDER — FUROSEMIDE 40 MG
20 TABLET ORAL ONCE
Qty: 0 | Refills: 0 | Status: COMPLETED | OUTPATIENT
Start: 2018-08-16 | End: 2018-08-16

## 2018-08-16 RX ORDER — CALCIUM GLUCONATE 100 MG/ML
2 VIAL (ML) INTRAVENOUS ONCE
Qty: 0 | Refills: 0 | Status: COMPLETED | OUTPATIENT
Start: 2018-08-16 | End: 2018-08-16

## 2018-08-16 RX ORDER — VANCOMYCIN HCL 1 G
500 VIAL (EA) INTRAVENOUS EVERY 12 HOURS
Qty: 0 | Refills: 0 | Status: DISCONTINUED | OUTPATIENT
Start: 2018-08-16 | End: 2018-08-16

## 2018-08-16 RX ADMIN — Medication 3 MILLILITER(S): at 12:25

## 2018-08-16 RX ADMIN — Medication 650 MILLIGRAM(S): at 12:30

## 2018-08-16 RX ADMIN — Medication 200 GRAM(S): at 21:43

## 2018-08-16 RX ADMIN — Medication 3 MILLILITER(S): at 12:26

## 2018-08-16 RX ADMIN — Medication 3 MILLILITER(S): at 05:09

## 2018-08-16 RX ADMIN — FAMOTIDINE 20 MILLIGRAM(S): 10 INJECTION INTRAVENOUS at 12:00

## 2018-08-16 RX ADMIN — SERTRALINE 50 MILLIGRAM(S): 25 TABLET, FILM COATED ORAL at 12:02

## 2018-08-16 RX ADMIN — ENOXAPARIN SODIUM 40 MILLIGRAM(S): 100 INJECTION SUBCUTANEOUS at 12:01

## 2018-08-16 RX ADMIN — MIRTAZAPINE 45 MILLIGRAM(S): 45 TABLET, ORALLY DISINTEGRATING ORAL at 21:43

## 2018-08-16 RX ADMIN — LIDOCAINE 1 PATCH: 4 CREAM TOPICAL at 11:59

## 2018-08-16 RX ADMIN — Medication 500 MILLIGRAM(S): at 12:00

## 2018-08-16 RX ADMIN — LIDOCAINE 1 PATCH: 4 CREAM TOPICAL at 23:08

## 2018-08-16 RX ADMIN — Medication 0.5 MILLIGRAM(S): at 17:24

## 2018-08-16 RX ADMIN — Medication 250 MILLIGRAM(S): at 18:20

## 2018-08-16 RX ADMIN — MEROPENEM 100 MILLIGRAM(S): 1 INJECTION INTRAVENOUS at 05:44

## 2018-08-16 RX ADMIN — FLUCONAZOLE 50 MILLIGRAM(S): 150 TABLET ORAL at 09:29

## 2018-08-16 RX ADMIN — Medication 0.5 MILLIGRAM(S): at 05:09

## 2018-08-16 RX ADMIN — Medication 650 MILLIGRAM(S): at 12:01

## 2018-08-16 RX ADMIN — Medication 1 TABLET(S): at 12:01

## 2018-08-16 RX ADMIN — Medication 3 MILLILITER(S): at 23:33

## 2018-08-16 RX ADMIN — Medication 20 MILLIGRAM(S): at 12:22

## 2018-08-16 RX ADMIN — MEROPENEM 100 MILLIGRAM(S): 1 INJECTION INTRAVENOUS at 14:36

## 2018-08-16 RX ADMIN — SODIUM CHLORIDE 10 MILLILITER(S): 9 INJECTION INTRAMUSCULAR; INTRAVENOUS; SUBCUTANEOUS at 23:14

## 2018-08-16 RX ADMIN — DIPHENHYDRAMINE HYDROCHLORIDE AND LIDOCAINE HYDROCHLORIDE AND ALUMINUM HYDROXIDE AND MAGNESIUM HYDRO 5 MILLILITER(S): KIT at 12:03

## 2018-08-16 RX ADMIN — IRON SUCROSE 210 MILLIGRAM(S): 20 INJECTION, SOLUTION INTRAVENOUS at 17:34

## 2018-08-16 RX ADMIN — Medication 3 MILLIGRAM(S): at 21:42

## 2018-08-16 RX ADMIN — Medication 10 MILLIGRAM(S): at 12:01

## 2018-08-16 RX ADMIN — Medication 1: at 23:14

## 2018-08-16 RX ADMIN — CHLORHEXIDINE GLUCONATE 1 APPLICATION(S): 213 SOLUTION TOPICAL at 05:27

## 2018-08-16 RX ADMIN — Medication 3 MILLILITER(S): at 17:24

## 2018-08-16 RX ADMIN — Medication 100 MILLIGRAM(S): at 06:46

## 2018-08-16 RX ADMIN — QUETIAPINE FUMARATE 50 MILLIGRAM(S): 200 TABLET, FILM COATED ORAL at 21:43

## 2018-08-16 RX ADMIN — Medication 2 MILLIGRAM(S): at 21:43

## 2018-08-16 RX ADMIN — Medication 1: at 18:19

## 2018-08-16 NOTE — PROGRESS NOTE ADULT - ATTENDING COMMENTS
Tolerating extubation well  Failed swallow eval s/p NGT feed, all psy meds through NGT  Off pressure, start small dose lasix via NGT  long course abx antifungal  Mobilization, PT   at bed side kept updated

## 2018-08-16 NOTE — PROGRESS NOTE ADULT - ASSESSMENT
ASSESSMENT:  68 year old female presenting with septic shock from MRSA bacteremia secondary to infected right hemiarthroplasty hardware s/p right hip I&D, explantation of right hemiarthroplasty, placement of antibiotic spacer, and right femur ORIF.    PLAN:    Neuro: acute post-op pain, anxiety, depression, dementia, intubated  - Monitor mental status.   - Home regimen of Klonopin, Zoloft, Seroquel, Remeron, and melatonin for agitation related to her dementia.  - Pain control with Tylenol, oxycodone, and Lidoderm patch.    Resp: acute respiratory distress, asthma, possible PNA  - Monitor pulse oximeter and ABGs.  - Out of bed to chair, incentive spirometry, and pulmonary toileting to prevent atelectasis.  - Home prednisone 10 mg daily, Pulmicort, and Duoneb for asthma. Mucomyst for thick secretions.    CV: distributive shock (resolved)  - Monitor vital signs.    GI: no acute issues  - NPO with Pivot at goal of 30 mL/hr.  - Famotidine for GERD.  - Bowel regimen with Colace & senna.    Renal: CKD stage 2  - Monitor I&Os.  - Monitor electrolytes and replete as necessary.  - Assess need for diuresis.    Heme: no acute issues  - Lovenox for VTE prophylaxis.    ID: MRSA bacteremia from infected right hip, Candiduria, possible PNA  - Monitor WBC, temperature, and procalcitonin.  - Reculture as clinically indicated.  - Vancomycin and meropenem for MRSA bacteremia and possible PNA. Diflucan for Candiduria. Appreciate ID recommendations.    Endo: hyperglycemia  - Monitor fingersticks q6hrs for hypoglycemia.    Disposition:  - Full code.  - Will remain in SICU for respiratory monitoring.    Cynthia Wilkins PA-C    [id number]

## 2018-08-16 NOTE — PROGRESS NOTE ADULT - ASSESSMENT
68F s/p R hip PJI explant and placement of cement spacer w/ ORIF distal femur    Pain control  DVT ppx lovenox  FU Cultures  Abx per ID  NWB LLE  FFWB RLE  PT/OT      Ortho 1337

## 2018-08-16 NOTE — PROGRESS NOTE ADULT - SUBJECTIVE AND OBJECTIVE BOX
CC: f/u for  mrsa bacteremia and prosthetic hip infection and severe sepsis unclear etiology  Patient reports  she is ok, now extubated on nasal cannula, no pressors  REVIEW OF SYSTEMS:  All other review of systems negative (Comprehensive ROS)    Antimicrobials Day #  :  fluconAZOLE IVPB 100 milliGRAM(s) IV Intermittent every 24 hours  Day 4  meropenem  IVPB 1000 milliGRAM(s) IV Intermittent every 8 hours   Day 7  vancomycin  IVPB 750 milliGRAM(s) IV Intermittent every 12 hours  POD 12/42 leisa/spacer    Other Medications Reviewed    T(F): 98.2 (08-16-18 @ 11:00), Max: 98.8 (08-15-18 @ 20:00)  HR: 105 (08-16-18 @ 14:00)  BP: 141/63 (08-16-18 @ 14:00)  RR: 13 (08-16-18 @ 14:00)  SpO2: 97% (08-16-18 @ 14:00)  Wt(kg): --    PHYSICAL EXAM:  General: alert, no acute distress  Eyes:  anicteric, no conjunctival injection, no discharge  Oropharynx: no lesions or injection 	  Neck: supple, without adenopathy  Lungs: basilar rales to auscultation  Heart: regular rate and rhythm; no murmur, rubs or gallops  Abdomen: soft, nondistended, nontender, without mass or organomegaly  Skin: rash  Extremities: no clubbing, cyanosis,legs with some edema  Neurologic: alert, moves all extremities    LAB RESULTS:                        7.1    6.2   )-----------( 390      ( 16 Aug 2018 03:18 )             21.9     08-16    135  |  104  |  41<H>  ----------------------------<  132<H>  4.2   |  22  |  0.89    Ca    7.6<L>      16 Aug 2018 03:18  Phos  3.8     08-16  Mg     2.2     08-16          MICROBIOLOGY:  RECENT CULTURES:  08-12 @ 05:20 .Urine Catheterized     <10,000 CFU/ml  Normal Urogenital ivnce present      08-12 @ 00:16 .Blood Blood     No growth to date.          RADIOLOGY REVIEWED:    < from: Xray Chest 1 View- PORTABLE-Routine (08.16.18 @ 07:23) >    IMPRESSION:    1. Post enteric tube adjustment.  2. Interstitial edema with bilateral small effusions.      < end of copied text >    Assessment:  Patient with mrsa bacteremia from infected hip s/p leisa and spacer after failing just a washout who then had severe sepsis with respiratory failure last week, maybe from a pulmonary source now getting a rash maybe from the meropenem but also has stablised and is extubated so no need to continue the meropenem. Latest urine culture also negative so no need for diflucan either.    Plan: Change vancomycin to 1 g daily  stop meropenem and diflucan  supportive care per sicu  r/w Dr. Altman

## 2018-08-16 NOTE — PROGRESS NOTE ADULT - SUBJECTIVE AND OBJECTIVE BOX
---___---___---___---___---___---___ ---___---___---___---___---___---___---___---___---___---                    <<<  M E D I C A L   A T T E N D I N G    F O L L O W    U P   N O T E  >>>    has been more and more agitated as of late   not intubated but breathing heavy   ---___---___---___---___---___---      <<<  MEDICATIONS:  >>>    MEDICATIONS  (STANDING):  acetylcysteine 20% Inhalation 3 milliLiter(s) Inhalation every 6 hours  ALBUTerol/ipratropium for Nebulization 3 milliLiter(s) Nebulizer every 6 hours  ascorbic acid 500 milliGRAM(s) Oral daily  buDESOnide   0.5 milliGRAM(s) Respule 0.5 milliGRAM(s) Inhalation every 12 hours  calcium gluconate IVPB 2 Gram(s) IV Intermittent once  chlorhexidine 4% Liquid 1 Application(s) Topical <User Schedule>  clonazePAM Tablet 2 milliGRAM(s) Oral at bedtime  enoxaparin Injectable 40 milliGRAM(s) SubCutaneous daily  famotidine    Tablet 20 milliGRAM(s) Oral daily  FIRST- Mouthwash  BLM 5 milliLiter(s) Swish and Spit daily  furosemide Solution 20 milliGRAM(s) Oral daily  insulin lispro (HumaLOG) corrective regimen sliding scale   SubCutaneous every 6 hours  iron sucrose IVPB 100 milliGRAM(s) IV Intermittent every 24 hours  lidocaine   Patch 1 Patch Transdermal every 24 hours  melatonin 3 milliGRAM(s) Oral <User Schedule>  mirtazapine 45 milliGRAM(s) Oral <User Schedule>  multivitamin/minerals 1 Tablet(s) Oral daily  predniSONE   Tablet 10 milliGRAM(s) Oral every 24 hours  QUEtiapine 50 milliGRAM(s) Oral <User Schedule>  sertraline 50 milliGRAM(s) Oral daily  sodium chloride 0.9%. 1000 milliLiter(s) (10 mL/Hr) IV Continuous <Continuous>  vancomycin  IVPB 1000 milliGRAM(s) IV Intermittent every 24 hours      MEDICATIONS  (PRN):  acetaminophen    Suspension. 650 milliGRAM(s) Enteral Tube every 6 hours PRN Mild Pain (1 - 3)       ---___---___---___---___---___---     <<<REVIEW OF SYSTEM: >>>    GEN: no fever, no chills, no pain  RESP: no SOB, no cough, no sputum  CVS: no chest pain, no palpitations, no edema  GI: no abdominal pain, no nausea, no vomiting, no constipation, no diarrhea  : no dysurea, no frequency  NEURO: no headache, no dizziness  PSYCH: no depression, not anxious  Derm : no itching, no rash     ---___---___---___---___---___---          <<<  VITAL SIGNS: >>>    T(F): 97.9 (08-16-18 @ 15:00), Max: 98.8 (08-15-18 @ 20:00)  HR: 113 (08-16-18 @ 18:00) (84 - 113)  BP: 125/97 (08-16-18 @ 18:00) (123/68 - 158/74)  RR: 33 (08-16-18 @ 18:00) (13 - 45)  SpO2: 100% (08-16-18 @ 18:00) (94% - 100%)  Wt(kg): --  CAPILLARY BLOOD GLUCOSE      POCT Blood Glucose.: 186 mg/dL (16 Aug 2018 17:29)    I&O's Summary    15 Aug 2018 07:01  -  16 Aug 2018 07:00  --------------------------------------------------------  IN: 1250 mL / OUT: 2010 mL / NET: -760 mL    16 Aug 2018 07:01  -  16 Aug 2018 19:49  --------------------------------------------------------  IN: 1010 mL / OUT: 1620 mL / NET: -610 mL         ---___---___---___---___---___---                       PHYSICAL EXAM:    GEN: A&O X 3 , NAD , comfortable  HEENT: NCAT, PERRL, MMM, no scleral icterus, hearing intact  NECK: Supple, No JVD  CVS: S1S2 , regular , No M/R/G appreciated  PULM: CTA B/L,  no W/R/R appreciated  ABD.: soft. non tender, non distended,  bowel sounds present  Extrem: intact pulses , no edema noted  Derm: No rash or ecchymosis noted  PSYCH: normal mood, no depression, not anxious     ---___---___---___---___---___---     <<<  LAB AND IMAGING: >>>                          8.2    8.6   )-----------( 478      ( 16 Aug 2018 19:37 )             26.0               08-16    135  |  104  |  41<H>  ----------------------------<  132<H>  4.2   |  22  |  0.89    Ca    7.6<L>      16 Aug 2018 03:18  Phos  3.8     08-16  Mg     2.2     08-16                         ABG - ( 15 Aug 2018 10:48 )  pH, Arterial: 7.45  pH, Blood: x     /  pCO2: 37    /  pO2: 148   / HCO3: 25    / Base Excess: 1.7   /  SaO2: 100                     [All pertinent / recent available Imaging reports and other labs reviewed]     ---___---___---___---___---___---___ ---___---___---___---___---           <<<  A S S E S S M E N T   A N D   P L A N :  >>>          -GI/DVT Prophylaxis.    --------------------------------------------  Case discussed with mr roque the    Education given on     >>______________________<<      Deniz Bolden .         phone   [phone]

## 2018-08-16 NOTE — PROGRESS NOTE ADULT - SUBJECTIVE AND OBJECTIVE BOX
Ortho Progress Note    S: Patient seen and examined. Patient was extubated yesterday and doing well.       O:  Physical Exam:  Gen: Laying in bed, NAD, alert   Resp: Unlabored breathing  Ext: EHL/FHL/TA/Sol intact          + SILT DP/SP/REED/Sa          +DP, extremity WWP  aquacell c/d/i    Vital Signs Last 24 Hrs  T(C): 36.7 (16 Aug 2018 08:00), Max: 37.1 (15 Aug 2018 15:00)  T(F): 98.1 (16 Aug 2018 08:00), Max: 98.8 (15 Aug 2018 20:00)  HR: 98 (16 Aug 2018 10:00) (85 - 114)  BP: 125/60 (16 Aug 2018 10:00) (124/60 - 159/73)  BP(mean): 87 (16 Aug 2018 10:00) (84 - 112)  RR: 29 (16 Aug 2018 10:00) (15 - 45)  SpO2: 100% (16 Aug 2018 10:00) (92% - 100%)                          7.1    6.2   )-----------( 390      ( 16 Aug 2018 03:18 )             21.9                         7.4    7.5   )-----------( 371      ( 15 Aug 2018 02:38 )             22.7       08-16    135  |  104  |  41<H>  ----------------------------<  132<H>  4.2   |  22  |  0.89

## 2018-08-16 NOTE — PROGRESS NOTE ADULT - SUBJECTIVE AND OBJECTIVE BOX
HISTORY:  68 year old female with a past medical history of asthma on steroids, CVA (no residual deficits), pulmonary HTN, HLD, GERD, ulcerative colitis, fatty pancreas, impaired glucose tolerance, anxiety, and depression who presented on 7/26/2018 with altered mental status, rigors, and urinary frequency. Of note, patient was recently hospitalized for a right femoral neck fracture secondary to osteoporosis s/p hemiarthroplasty on 6/22/2018. She had been taking Plavix for her history of CVA and Lovenox for VTE prophylaxis but she developed a hematoma at her surgical site so the Lovenox was stopped. Since then, patient has been progressively more agitated, confused, and weak. Additionally, patient has been complaining of new onset low back pain and left hip pain. In the ED, patient was hemodynamically stable but noted to be hypoglycemic. She was also started on meropenem & vancomycin for concern of sepsis. CT scan revealed a 17 cm collection adjacent to the right hip prosthesis, an acute L2 compression fracture, and new left acetabular fracture. Patient was admitted to medicine but on 7/27/2018, patient was noted to be more altered, rigorous, tachypneic, febrile to 101 F, and hypotensive w/ SBP in the 60s so an RRT was called. She was more fluid and started on a norepinephrine gtt. Patient subsequently admitted to SICU for hemodynamic monitoring. Blood cultures positive for MRSA. Decision was made to take the patient to the OR on 7/27/2018 for right hip I&D and exchange of the femoral head. She returned to the SICU intubated on vasopressor support. She was extubated on 7/30/2018 but was reintubated on 8/2/2018. Additionally, patient was persistently on vasopressor support and bacteremic with MRSA. Patient was subsequently taken back to the OR on 8/4/2018 for further right hip I&D, explantation of right hemiarthroplasty, placement of antibiotic spacer, and right femur ORIF. Patient was subsequently weaned off vasopressor support on 8/5/2018 and extubated on 8/6/2018 but required reintubation for tachypnea with accessory muscle use on 8/11/2018.    24 HOUR EVENTS:  - Patient successfully extubated.  - Vancomycin level 18.9 this AM so restarted vancomycin at 500 mg BID.  - Blackman catheter discontinued but retained twice so Blackman replaced.  - ISS changed from q4hrs to q6hrs.  - Started Mucomyst overnight for thickened secretions.    SUBJECTIVE/ROS:  [x] A ten-point review of systems was otherwise negative except as noted.  [ ] Due to altered mental status/intubation, subjective information were not able to be obtained from the patient. History was obtained, to the extent possible, from review of the chart and collateral sources of information.    NEURO  Exam: awake, follows all commands, intermittently agitated, moving all four extremities  Meds:  - acetaminophen    Suspension. 650 milliGRAM(s) Enteral Tube every 6 hours PRN Mild Pain (1 - 3)  - clonazePAM Tablet 2 milliGRAM(s) Oral at bedtime  - melatonin 3 milliGRAM(s) Oral <User Schedule>  - mirtazapine 45 milliGRAM(s) Oral <User Schedule>  - oxyCODONE    Solution 5 milliGRAM(s) Enteral Tube every 4 hours PRN Severe Pain (7 - 10)  - QUEtiapine 50 milliGRAM(s) Oral <User Schedule>  - sertraline 50 milliGRAM(s) Oral daily  - lidocaine   Patch 1 Patch Transdermal every 24 hours  [x] Adequacy of sedation and pain control has been assessed and adjusted    RESPIRATORY  RR: 17 (08-16-18 @ 03:00) (17 - 45)  SpO2: 100% (08-16-18 @ 05:12) (92% - 100%)  Exam: coarse rhonchi in all lung fields  Mechanical Ventilation: none  [N/A] Extubation Readiness Assessed  Meds:  - ALBUTerol/ipratropium for Nebulization 3 milliLiter(s) Nebulizer every 6 hours  - buDESOnide   0.5 milliGRAM(s) Respule 0.5 milliGRAM(s) Inhalation every 12 hours  - predniSONE   Tablet 10 milliGRAM(s) Oral every 24 hours    CARDIOVASCULAR  HR: 85 (08-16-18 @ 05:12) (85 - 114)  BP: 141/66 (08-16-18 @ 03:00) (107/77 - 159/73)  BP(mean): 95 (08-16-18 @ 03:00) (84 - 112)  Exam: regular rate and rhythm, S1S2  Cardiac Rhythm: sinus  Perfusion    [x]Adequate    [ ]Inadequate  Mentation   [ ]Normal       [x]Reduced  Extremities  [x]Warm         [ ]Cool  Volume Status [ ]Hypervolemic [x]Euvolemic [ ]Hypovolemic  Meds: none    GI/NUTRITION  Exam: soft, nontender, nondistended  Diet: NPO with Pivot at 30 mL/hr  Meds:  - famotidine    Tablet 20 milliGRAM(s) Oral daily  - ascorbic acid 500 milliGRAM(s) Oral daily  - multivitamin/minerals 1 Tablet(s) Oral daily    GENITOURINARY      135  |  104  |  41<H>  ----------------------------<  132<H>  4.2   |  22  |  0.89    Ca    7.6<L>      16 Aug 2018 03:18  Phos  3.8  Mg     2.2    [x] Blackman catheter, indication: urinary retention  Meds: sodium chloride 0.9% infuse at 10 mL/hr    HEMATOLOGIC  Meds:  - enoxaparin Injectable 40 milliGRAM(s) SubCutaneous daily  - iron sucrose IVPB 100 milliGRAM(s) IV Intermittent every 24 hours  [x] VTE Prophylaxis                        7.1    6.2   )-----------( 390      ( 16 Aug 2018 03:18 )             21.9     INFECTIOUS DISEASES  T(C): 36.8 (08-16-18 @ 00:00), Max: 37.4 (08-15-18 @ 08:00)  WBC Count: 6.2 K/uL (08-16 @ 03:18)    Recent Cultures:    Specimen Source: .Urine Catheterized, 08-12 @ 05:20  Results: <10,000 CFU/ml Normal Urogenital vince present  Gram Stain: --; Organism: --    Specimen Source: .Blood Blood, 08-12 @ 00:16  Results: No growth to date.; Gram Stain: --; Organism: --    Specimen Source: .Urine Catheterized, 08-09 @ 21:17  Results: 50,000 - 99,000 CFU/mL Presumptive Candida albicans  Gram Stain: --; Organism: --    Specimen Source: .Blood Blood, 08-09 @ 20:07  Results: No growth at 5 days.; Gram Stain: --; Organism: --    Specimen Source: .Blood Blood, 08-09 @ 17:44  Results: No growth at 5 days.; Gram Stain: --; Organism: --    Meds:  - fluconAZOLE IVPB 100 milliGRAM(s) IV Intermittent every 24 hours  - meropenem  IVPB 1000 milliGRAM(s) IV Intermittent every 8 hours  - vancomycin  IVPB 500 milliGRAM(s) IV Intermittent every 12 hours    ENDOCRINE  Capillary Blood Glucose  POCT Blood Glucose.: 162 mg/dL (15 Aug 2018 17:47)  Meds: insulin lispro (HumaLOG) corrective regimen sliding scale   SubCutaneous every 6 hours    ACCESS DEVICES:  [x] Peripheral IV  [ ] Central Venous Line	[ ] R	[ ] L	[ ] IJ	[ ] Fem	[ ] SC	Placed:   [ ] Arterial Line		[ ] R	[ ] L	[ ] Fem	[ ] Rad	[ ] Ax	Placed:   [x] PICC:	 LUE PICC placed 8/14/2018			[ ] Mediport  [x] Urinary Catheter, Date Placed: 8/16/2018  [x] Necessity of urinary, arterial, and venous catheters discussed    OTHER MEDICATIONS:  - chlorhexidine 4% Liquid 1 Application(s) Topical <User Schedule>  - FIRST- Mouthwash  BLM 5 milliLiter(s) Swish and Spit daily    CODE STATUS: Full code.    IMAGING:

## 2018-08-17 LAB
ANION GAP SERPL CALC-SCNC: 13 MMOL/L — SIGNIFICANT CHANGE UP (ref 5–17)
BUN SERPL-MCNC: 37 MG/DL — HIGH (ref 7–23)
CA-I BLD-SCNC: 1.2 MMOL/L — SIGNIFICANT CHANGE UP (ref 1.12–1.3)
CALCIUM SERPL-MCNC: 8.2 MG/DL — LOW (ref 8.4–10.5)
CHLORIDE SERPL-SCNC: 102 MMOL/L — SIGNIFICANT CHANGE UP (ref 96–108)
CO2 SERPL-SCNC: 22 MMOL/L — SIGNIFICANT CHANGE UP (ref 22–31)
CREAT SERPL-MCNC: 0.87 MG/DL — SIGNIFICANT CHANGE UP (ref 0.5–1.3)
CULTURE RESULTS: SIGNIFICANT CHANGE UP
CULTURE RESULTS: SIGNIFICANT CHANGE UP
GLUCOSE SERPL-MCNC: 115 MG/DL — HIGH (ref 70–99)
HCT VFR BLD CALC: 28.6 % — LOW (ref 34.5–45)
HGB BLD-MCNC: 9.3 G/DL — LOW (ref 11.5–15.5)
MAGNESIUM SERPL-MCNC: 2 MG/DL — SIGNIFICANT CHANGE UP (ref 1.6–2.6)
MCHC RBC-ENTMCNC: 29.4 PG — SIGNIFICANT CHANGE UP (ref 27–34)
MCHC RBC-ENTMCNC: 32.3 GM/DL — SIGNIFICANT CHANGE UP (ref 32–36)
MCV RBC AUTO: 91 FL — SIGNIFICANT CHANGE UP (ref 80–100)
PHOSPHATE SERPL-MCNC: 3.2 MG/DL — SIGNIFICANT CHANGE UP (ref 2.5–4.5)
PLATELET # BLD AUTO: 413 K/UL — HIGH (ref 150–400)
POTASSIUM SERPL-MCNC: 4.5 MMOL/L — SIGNIFICANT CHANGE UP (ref 3.5–5.3)
POTASSIUM SERPL-SCNC: 4.5 MMOL/L — SIGNIFICANT CHANGE UP (ref 3.5–5.3)
RBC # BLD: 3.15 M/UL — LOW (ref 3.8–5.2)
RBC # FLD: 14.2 % — SIGNIFICANT CHANGE UP (ref 10.3–14.5)
SODIUM SERPL-SCNC: 137 MMOL/L — SIGNIFICANT CHANGE UP (ref 135–145)
SPECIMEN SOURCE: SIGNIFICANT CHANGE UP
SPECIMEN SOURCE: SIGNIFICANT CHANGE UP
VANCOMYCIN TROUGH SERPL-MCNC: 26.1 UG/ML — CRITICAL HIGH (ref 10–20)
WBC # BLD: 9.9 K/UL — SIGNIFICANT CHANGE UP (ref 3.8–10.5)
WBC # FLD AUTO: 9.9 K/UL — SIGNIFICANT CHANGE UP (ref 3.8–10.5)

## 2018-08-17 PROCEDURE — 71045 X-RAY EXAM CHEST 1 VIEW: CPT | Mod: 26

## 2018-08-17 PROCEDURE — 99291 CRITICAL CARE FIRST HOUR: CPT

## 2018-08-17 PROCEDURE — 99233 SBSQ HOSP IP/OBS HIGH 50: CPT

## 2018-08-17 PROCEDURE — 93306 TTE W/DOPPLER COMPLETE: CPT | Mod: 26

## 2018-08-17 PROCEDURE — 99231 SBSQ HOSP IP/OBS SF/LOW 25: CPT

## 2018-08-17 RX ORDER — FUROSEMIDE 40 MG
20 TABLET ORAL ONCE
Qty: 0 | Refills: 0 | Status: COMPLETED | OUTPATIENT
Start: 2018-08-17 | End: 2018-08-17

## 2018-08-17 RX ADMIN — Medication 20 MILLIGRAM(S): at 11:07

## 2018-08-17 RX ADMIN — SERTRALINE 50 MILLIGRAM(S): 25 TABLET, FILM COATED ORAL at 12:32

## 2018-08-17 RX ADMIN — LIDOCAINE 1 PATCH: 4 CREAM TOPICAL at 12:31

## 2018-08-17 RX ADMIN — QUETIAPINE FUMARATE 50 MILLIGRAM(S): 200 TABLET, FILM COATED ORAL at 21:12

## 2018-08-17 RX ADMIN — CHLORHEXIDINE GLUCONATE 1 APPLICATION(S): 213 SOLUTION TOPICAL at 05:31

## 2018-08-17 RX ADMIN — DIPHENHYDRAMINE HYDROCHLORIDE AND LIDOCAINE HYDROCHLORIDE AND ALUMINUM HYDROXIDE AND MAGNESIUM HYDRO 5 MILLILITER(S): KIT at 12:31

## 2018-08-17 RX ADMIN — Medication 3 MILLILITER(S): at 05:10

## 2018-08-17 RX ADMIN — Medication 1 TABLET(S): at 12:32

## 2018-08-17 RX ADMIN — FAMOTIDINE 20 MILLIGRAM(S): 10 INJECTION INTRAVENOUS at 12:32

## 2018-08-17 RX ADMIN — Medication 500 MILLIGRAM(S): at 12:32

## 2018-08-17 RX ADMIN — Medication 3 MILLILITER(S): at 11:44

## 2018-08-17 RX ADMIN — Medication 1: at 23:06

## 2018-08-17 RX ADMIN — Medication 10 MILLIGRAM(S): at 12:31

## 2018-08-17 RX ADMIN — ENOXAPARIN SODIUM 40 MILLIGRAM(S): 100 INJECTION SUBCUTANEOUS at 12:31

## 2018-08-17 RX ADMIN — MIRTAZAPINE 45 MILLIGRAM(S): 45 TABLET, ORALLY DISINTEGRATING ORAL at 21:12

## 2018-08-17 RX ADMIN — Medication 2 MILLIGRAM(S): at 21:12

## 2018-08-17 RX ADMIN — Medication 3 MILLILITER(S): at 05:11

## 2018-08-17 RX ADMIN — Medication 3 MILLILITER(S): at 18:10

## 2018-08-17 RX ADMIN — Medication 20 MILLIGRAM(S): at 05:31

## 2018-08-17 RX ADMIN — IRON SUCROSE 210 MILLIGRAM(S): 20 INJECTION, SOLUTION INTRAVENOUS at 18:46

## 2018-08-17 RX ADMIN — Medication 0.5 MILLIGRAM(S): at 05:10

## 2018-08-17 RX ADMIN — Medication 0.5 MILLIGRAM(S): at 18:10

## 2018-08-17 RX ADMIN — Medication 3 MILLIGRAM(S): at 21:12

## 2018-08-17 NOTE — PROGRESS NOTE ADULT - ASSESSMENT
ASSESSMENT:  68 year old female presenting with septic shock from MRSA bacteremia secondary to infected right hemiarthroplasty hardware s/p right hip I&D, explantation of right hemiarthroplasty, placement of antibiotic spacer, and right femur ORIF. Course complicated by respiratory failure requiring reintubation, and BiPAP following extubation.    PLAN:  Neuro: acute post-op pain, anxiety, depression, dementia  - Monitor mental status.   - Home regimen of Klonopin, Zoloft, Seroquel, Remeron, and melatonin for agitation related to her dementia.  - Pain control with Tylenol and Lidoderm patch.    Resp: acute respiratory distress, asthma, resolved PNA s/p course of meropenem  - Monitor pulse oximeter and ABGs.  - BiPAP as needed, and at night, for respiratory distress  - Continue Lasix daily for pleural effusions  - Out of bed to chair, incentive spirometry, and pulmonary toileting to prevent atelectasis.  - Home prednisone 10 mg daily, Pulmicort, and Duoneb for asthma.  - Mucomyst for thick secretions.    CV: distributive shock (resolved)  - Monitor vital signs.    GI: no acute issues  - NPO with Pivot at goal of 30 mL/hr - held for BiPAP  - Famotidine for GERD.  - Bowel regimen with Colace & senna.    Renal: CKD stage 2  - Monitor I&Os.  - Monitor electrolytes and replete as necessary.  - Lasix 20mg PO daily, reassess need for IV Lasix daily.     Heme: no acute issues  - Lovenox for VTE prophylaxis.    ID: MRSA bacteremia from infected right hip, Candiduria, possible PNA, resolved  - Monitor WBC, temperature, and procalcitonin.  - Vancomycin for MRSA bacteremia  - Appreciate ID recommendations.  - Reculture as clinically indicated.    Endo: hyperglycemia  - Monitor fingersticks q6hrs for hypoglycemia.    Disposition:  Full code. Will remain in SICU for respiratory monitoring.    -Denia Maddox PA-C  [id number]

## 2018-08-17 NOTE — PROGRESS NOTE ADULT - ATTENDING COMMENTS
needs NIV support for resp distress alst night, good gas exchange, will wean of BiPAP, pt very deconditioned  Resume tube gfeed  off vasopressor support  Abx Vanco sindym, vanco trough  Gentle diuresis   daughter kept updated

## 2018-08-17 NOTE — PROGRESS NOTE ADULT - SUBJECTIVE AND OBJECTIVE BOX
24 h: Pt received 1u PRBC for hematocrit 21.9, it responded to 26. She received 20mg IV Lasix in addition to her 20mg PO Lasix. In the evening, she complained of shortness of breath. Her tube feeds were held and she was placed on BiPAP for tachypnea and increased work of breathing. 24 HOUR EVENTS: Pt received 1u PRBC for hematocrit 21.9, it responded to 26. She received 20mg IV Lasix in addition to her 20mg PO Lasix. In the evening, she complained of shortness of breath. Her tube feeds were held and she was placed on BiPAP for tachypnea and increased work of breathing.    HISTORY  68 year old female with a past medical history of asthma on steroids, CVA (no residual deficits), pulmonary HTN, HLD, GERD, ulcerative colitis, fatty pancreas, impaired glucose tolerance, anxiety, and depression who presented on 7/26/2018 with altered mental status, rigors, and urinary frequency. Of note, patient was recently hospitalized for a right femoral neck fracture secondary to osteoporosis s/p hemiarthroplasty on 6/22/2018. She had been taking Plavix for her history of CVA and Lovenox for VTE prophylaxis but she developed a hematoma at her surgical site so the Lovenox was stopped. Since then, patient has been progressively more agitated, confused, and weak. Additionally, patient has been complaining of new onset low back pain and left hip pain. In the ED, patient was hemodynamically stable but noted to be hypoglycemic. She was also started on meropenem & vancomycin for concern of sepsis. CT scan revealed a 17 cm collection adjacent to the right hip prosthesis, an acute L2 compression fracture, and new left acetabular fracture. Patient was admitted to medicine but on 7/27/2018, patient was noted to be more altered, rigorous, tachypneic, febrile to 101 F, and hypotensive w/ SBP in the 60s so an RRT was called. She was more fluid and started on a norepinephrine gtt. Patient subsequently admitted to SICU for hemodynamic monitoring. Blood cultures positive for MRSA. Decision was made to take the patient to the OR on 7/27/2018 for right hip I&D and exchange of the femoral head. She returned to the SICU intubated on vasopressor support. She was extubated on 7/30/2018 but was reintubated on 8/2/2018. Additionally, patient was persistently on vasopressor support and bacteremic with MRSA. Patient was subsequently taken back to the OR on 8/4/2018 for further right hip I&D, explantation of right hemiarthroplasty, placement of antibiotic spacer, and right femur ORIF. Patient was subsequently weaned off vasopressor support on 8/5/2018 and extubated on 8/6/2018 but required reintubation (2nd time) for tachypnea with accessory muscle use on 8/11/2018. Pt subsequently extubated 8/15/18.      SUBJECTIVE/ROS:  [x ] A ten-point review of systems was otherwise negative except as noted.  [ ] Due to altered mental status/intubation, subjective information were not able to be obtained from the patient. History was obtained, to the extent possible, from review of the chart and collateral sources of information.      NEURO  RASS:  0     Exam: awake, alert, follows commands, AAOx 2 (self, location)  Meds: acetaminophen    Suspension. 650 milliGRAM(s) Enteral Tube every 6 hours PRN Mild Pain (1 - 3)  clonazePAM Tablet 2 milliGRAM(s) Oral at bedtime  melatonin 3 milliGRAM(s) Oral <User Schedule>  mirtazapine 45 milliGRAM(s) Oral <User Schedule>  QUEtiapine 50 milliGRAM(s) Oral <User Schedule>  sertraline 50 milliGRAM(s) Oral daily    [x] Adequacy of sedation and pain control has been assessed and adjusted      RESPIRATORY  RR: 22 (08-17-18 @ 03:00) (13 - 33)  SpO2: 100% (08-17-18 @ 03:00) (94% - 100%)  Exam: clear to auscultation bilaterally; mild accessory muscle use improved on BiPAP    [n/a ] Extubation Readiness Assessed  Meds: acetylcysteine 20% Inhalation 3 milliLiter(s) Inhalation every 6 hours  ALBUTerol/ipratropium for Nebulization 3 milliLiter(s) Nebulizer every 6 hours  buDESOnide   0.5 milliGRAM(s) Respule 0.5 milliGRAM(s) Inhalation every 12 hours        CARDIOVASCULAR  HR: 110 (08-17-18 @ 03:00) (84 - 115)  BP: 138/104 (08-17-18 @ 03:00) (110/57 - 168/92)  BP(mean): 117 (08-17-18 @ 03:00) (80 - 117)  VBG - ( 16 Aug 2018 20:20 )  pH: 7.46  /  pCO2: 38    /  pO2: 43    / HCO3: 26    / Base Excess: 3.0   /  SaO2: 78     Lactate: 1.2      Exam: regular rhythm, tachycardia  Cardiac Rhythm: sinus tachycardia  Perfusion     [x ]Adequate   [ ]Inadequate  Mentation   [x ]Normal       [ ]Reduced  Extremities  [x ]Warm         [ ]Cool  Volume Status [ x]Hypervolemic [ ]Euvolemic [ ]Hypovolemic  Meds: furosemide Solution 20 milliGRAM(s) Oral daily        GI/NUTRITION  Exam: soft, nontender, nondistended   Diet: NPO while on BiPAP  Meds: famotidine    Tablet 20 milliGRAM(s) Oral daily      GENITOURINARY  I&O's Detail    08-15 @ 07:01  -  08-16 @ 07:00  --------------------------------------------------------  IN:    Enteral Tube Flush: 50 mL    IV PiggyBack: 350 mL    Pivot: 510 mL    sodium chloride 0.9%.: 240 mL    Solution: 100 mL  Total IN: 1250 mL    OUT:    Indwelling Catheter - Urethral: 1410 mL    Intermittent Catheterization - Urethral: 600 mL  Total OUT: 2010 mL    Total NET: -760 mL      08-16 @ 07:01  -  08-17 @ 04:01  --------------------------------------------------------  IN:    Enteral Tube Flush: 60 mL    IV PiggyBack: 300 mL    Packed Red Blood Cells: 320 mL    Pivot: 360 mL    sodium chloride 0.9%.: 190 mL  Total IN: 1230 mL    OUT:    Indwelling Catheter - Urethral: 1935 mL  Total OUT: 1935 mL    Total NET: -705 mL      08-17    137  |  102  |  37<H>  ----------------------------<  115<H>  4.5   |  22  |  0.87    Ca    8.2<L>      17 Aug 2018 03:16  Phos  3.2     08-17  Mg     2.0     08-17      [ ] Blackman catheter, indication: N/A  Meds: ascorbic acid 500 milliGRAM(s) Oral daily  iron sucrose IVPB 100 milliGRAM(s) IV Intermittent every 24 hours  multivitamin/minerals 1 Tablet(s) Oral daily  sodium chloride 0.9%. 1000 milliLiter(s) IV Continuous <Continuous>        HEMATOLOGIC  Meds: enoxaparin Injectable 40 milliGRAM(s) SubCutaneous daily    [x] VTE Prophylaxis                        9.3    9.9   )-----------( 413      ( 17 Aug 2018 03:16 )             28.6       Transfusion     [ ] PRBC   [ ] Platelets   [ ] FFP   [ ] Cryoprecipitate      INFECTIOUS DISEASES  T(C): 37.1 (08-17-18 @ 03:00), Max: 37.1 (08-16-18 @ 19:00)  Wt(kg): --  WBC Count: 9.9 K/uL (08-17 @ 03:16)  WBC Count: 8.6 K/uL (08-16 @ 19:37)    Recent Cultures:  Specimen Source: .Urine Catheterized, 08-12 @ 05:20; Results   <10,000 CFU/ml  Normal Urogenital vince present; Gram Stain: --; Organism: --  Specimen Source: .Blood Blood, 08-12 @ 00:16; Results   No growth at 5 days.; Gram Stain: --; Organism: --    Meds: vancomycin  IVPB 1000 milliGRAM(s) IV Intermittent every 24 hours        ENDOCRINE  Capillary Blood Glucose    Meds: insulin lispro (HumaLOG) corrective regimen sliding scale   SubCutaneous every 6 hours  predniSONE   Tablet 10 milliGRAM(s) Oral every 24 hours        ACCESS DEVICES:  [ ] Peripheral IV  [ ] Central Venous Line	[ ] R	[ ] L	[ ] IJ	[ ] Fem	[ ] SC	Placed:   [ ] Arterial Line		[ ] R	[ ] L	[ ] Fem	[ ] Rad	[ ] Ax	Placed:   [x ] PICC:	L basilic 8/14/18				[ ] Mediport  [ ] Urinary Catheter, Date Placed:   [x ] Necessity of urinary, arterial, and venous catheters discussed    OTHER MEDICATIONS:  chlorhexidine 4% Liquid 1 Application(s) Topical <User Schedule>  FIRST- Mouthwash  BLM 5 milliLiter(s) Swish and Spit daily  lidocaine   Patch 1 Patch Transdermal every 24 hours      CODE STATUS:     IMAGING: 24 HOUR EVENTS: Pt received 1u PRBC for hematocrit 21.9, it responded to 26. She received 20mg IV Lasix in addition to her 20mg PO Lasix. In the evening, she complained of shortness of breath. Her tube feeds were held and she was placed on BiPAP for tachypnea and increased work of breathing.    HISTORY  68 year old female with a past medical history of asthma on steroids, CVA (no residual deficits), pulmonary HTN, HLD, GERD, ulcerative colitis, fatty pancreas, impaired glucose tolerance, anxiety, and depression who presented on 7/26/2018 with altered mental status, rigors, and urinary frequency. Of note, patient was recently hospitalized for a right femoral neck fracture secondary to osteoporosis s/p hemiarthroplasty on 6/22/2018. She had been taking Plavix for her history of CVA and Lovenox for VTE prophylaxis but she developed a hematoma at her surgical site so the Lovenox was stopped. Since then, patient has been progressively more agitated, confused, and weak. Additionally, patient has been complaining of new onset low back pain and left hip pain. In the ED, patient was hemodynamically stable but noted to be hypoglycemic. She was also started on meropenem & vancomycin for concern of sepsis. CT scan revealed a 17 cm collection adjacent to the right hip prosthesis, an acute L2 compression fracture, and new left acetabular fracture. Patient was admitted to medicine but on 7/27/2018, patient was noted to be more altered, rigorous, tachypneic, febrile to 101 F, and hypotensive w/ SBP in the 60s so an RRT was called. She was more fluid and started on a norepinephrine gtt. Patient subsequently admitted to SICU for hemodynamic monitoring. Blood cultures positive for MRSA. Decision was made to take the patient to the OR on 7/27/2018 for right hip I&D and exchange of the femoral head. She returned to the SICU intubated on vasopressor support. She was extubated on 7/30/2018 but was reintubated on 8/2/2018. Additionally, patient was persistently on vasopressor support and bacteremic with MRSA. Patient was subsequently taken back to the OR on 8/4/2018 for further right hip I&D, explantation of right hemiarthroplasty, placement of antibiotic spacer, and right femur ORIF. Patient was subsequently weaned off vasopressor support on 8/5/2018 and extubated on 8/6/2018 but required reintubation (2nd time) for tachypnea with accessory muscle use on 8/11/2018. Pt subsequently extubated 8/15/18.      SUBJECTIVE/ROS:  [x ] A ten-point review of systems was otherwise negative except as noted.  [ ] Due to altered mental status/intubation, subjective information were not able to be obtained from the patient. History was obtained, to the extent possible, from review of the chart and collateral sources of information.      NEURO  RASS:  0     Exam: awake, alert, follows commands, AAOx 2 (self, location)  Meds: acetaminophen    Suspension. 650 milliGRAM(s) Enteral Tube every 6 hours PRN Mild Pain (1 - 3)  clonazePAM Tablet 2 milliGRAM(s) Oral at bedtime  melatonin 3 milliGRAM(s) Oral <User Schedule>  mirtazapine 45 milliGRAM(s) Oral <User Schedule>  QUEtiapine 50 milliGRAM(s) Oral <User Schedule>  sertraline 50 milliGRAM(s) Oral daily    [x] Adequacy of sedation and pain control has been assessed and adjusted      RESPIRATORY  RR: 22 (08-17-18 @ 03:00) (13 - 33)  SpO2: 100% (08-17-18 @ 03:00) (94% - 100%)  Exam: clear to auscultation bilaterally; mild accessory muscle use improved on BiPAP    [n/a ] Extubation Readiness Assessed  Meds: acetylcysteine 20% Inhalation 3 milliLiter(s) Inhalation every 6 hours  ALBUTerol/ipratropium for Nebulization 3 milliLiter(s) Nebulizer every 6 hours  buDESOnide   0.5 milliGRAM(s) Respule 0.5 milliGRAM(s) Inhalation every 12 hours        CARDIOVASCULAR  HR: 110 (08-17-18 @ 03:00) (84 - 115)  BP: 138/104 (08-17-18 @ 03:00) (110/57 - 168/92)  BP(mean): 117 (08-17-18 @ 03:00) (80 - 117)  VBG - ( 16 Aug 2018 20:20 )  pH: 7.46  /  pCO2: 38    /  pO2: 43    / HCO3: 26    / Base Excess: 3.0   /  SaO2: 78     Lactate: 1.2      Exam: regular rhythm, tachycardia  Cardiac Rhythm: sinus tachycardia  Perfusion     [x ]Adequate   [ ]Inadequate  Mentation   [x ]Normal       [ ]Reduced  Extremities  [x ]Warm         [ ]Cool  Volume Status [ x]Hypervolemic [ ]Euvolemic [ ]Hypovolemic  Meds: furosemide Solution 20 milliGRAM(s) Oral daily        GI/NUTRITION  Exam: soft, nontender, nondistended   Diet: NPO while on BiPAP  Meds: famotidine    Tablet 20 milliGRAM(s) Oral daily      GENITOURINARY  I&O's Detail    08-15 @ 07:01  -  08-16 @ 07:00  --------------------------------------------------------  IN:    Enteral Tube Flush: 50 mL    IV PiggyBack: 350 mL    Pivot: 510 mL    sodium chloride 0.9%.: 240 mL    Solution: 100 mL  Total IN: 1250 mL    OUT:    Indwelling Catheter - Urethral: 1410 mL    Intermittent Catheterization - Urethral: 600 mL  Total OUT: 2010 mL    Total NET: -760 mL      08-16 @ 07:01  -  08-17 @ 04:01  --------------------------------------------------------  IN:    Enteral Tube Flush: 60 mL    IV PiggyBack: 300 mL    Packed Red Blood Cells: 320 mL    Pivot: 360 mL    sodium chloride 0.9%.: 190 mL  Total IN: 1230 mL    OUT:    Indwelling Catheter - Urethral: 1935 mL  Total OUT: 1935 mL    Total NET: -705 mL      08-17    137  |  102  |  37<H>  ----------------------------<  115<H>  4.5   |  22  |  0.87    Ca    8.2<L>      17 Aug 2018 03:16  Phos  3.2     08-17  Mg     2.0     08-17      [ n/a] Blackman catheter, indication: N/A  Meds: ascorbic acid 500 milliGRAM(s) Oral daily  iron sucrose IVPB 100 milliGRAM(s) IV Intermittent every 24 hours  multivitamin/minerals 1 Tablet(s) Oral daily  sodium chloride 0.9%. 1000 milliLiter(s) IV Continuous <Continuous>        HEMATOLOGIC  Meds: enoxaparin Injectable 40 milliGRAM(s) SubCutaneous daily    [x] VTE Prophylaxis                        9.3    9.9   )-----------( 413      ( 17 Aug 2018 03:16 )             28.6       Transfusion     [1 ] PRBC   [ ] Platelets   [ ] FFP   [ ] Cryoprecipitate      INFECTIOUS DISEASES  T(C): 37.1 (08-17-18 @ 03:00), Max: 37.1 (08-16-18 @ 19:00)  WBC Count: 9.9 K/uL (08-17 @ 03:16)  WBC Count: 8.6 K/uL (08-16 @ 19:37)    Recent Cultures:  Specimen Source: .Urine Catheterized, 08-12 @ 05:20; Results   <10,000 CFU/ml  Normal Urogenital vince present; Gram Stain: --; Organism: --  Specimen Source: .Blood Blood, 08-12 @ 00:16; Results   No growth at 5 days.; Gram Stain: --; Organism: --    Meds: vancomycin  IVPB 1000 milliGRAM(s) IV Intermittent every 24 hours        ENDOCRINE  Capillary Blood Glucose  POCT Blood Glucose.: 151 mg/dL (16 Aug 2018 23:12)  POCT Blood Glucose.: 186 mg/dL (16 Aug 2018 17:29)  POCT Blood Glucose.: 108 mg/dL (16 Aug 2018 11:57)  POCT Blood Glucose.: 104 mg/dL (16 Aug 2018 05:49)    Meds: insulin lispro (HumaLOG) corrective regimen sliding scale   SubCutaneous every 6 hours  predniSONE   Tablet 10 milliGRAM(s) Oral every 24 hours        ACCESS DEVICES:  [x ] Peripheral IV  [ ] Central Venous Line	[ ] R	[ ] L	[ ] IJ	[ ] Fem	[ ] SC	Placed:   [ ] Arterial Line		[ ] R	[ ] L	[ ] Fem	[ ] Rad	[ ] Ax	Placed:   [x ] PICC:	L basilic 8/14/18				[ ] Mediport  [ ] Urinary Catheter, Date Placed:   [x ] Necessity of urinary, arterial, and venous catheters discussed    OTHER MEDICATIONS:  chlorhexidine 4% Liquid 1 Application(s) Topical <User Schedule>  FIRST- Mouthwash  BLM 5 milliLiter(s) Swish and Spit daily  lidocaine   Patch 1 Patch Transdermal every 24 hours      CODE STATUS: Full code    IMAGING: x

## 2018-08-17 NOTE — PROGRESS NOTE ADULT - ASSESSMENT
68F s/p R hip PJI explant and placement of cement spacer w/ ORIF distal femur    Pain control  DVT ppx lovenox  Abx per ID  NWB LLE  FFWB RLE  PT/OT

## 2018-08-17 NOTE — PROGRESS NOTE ADULT - SUBJECTIVE AND OBJECTIVE BOX
PULMONARY PROGRESS NOTE    MYRIAM HEATH  MRN-[id number]    Patient is a 68y old  Female who presents with a chief complaint of weakness (26 Jul 2018 16:54)      HPI:  on nasal canula, sitting up in chair, hunched over, no complaints      MEDICATIONS  (STANDING):  acetylcysteine 20% Inhalation 3 milliLiter(s) Inhalation every 6 hours  ALBUTerol/ipratropium for Nebulization 3 milliLiter(s) Nebulizer every 6 hours  ascorbic acid 500 milliGRAM(s) Oral daily  buDESOnide   0.5 milliGRAM(s) Respule 0.5 milliGRAM(s) Inhalation every 12 hours  chlorhexidine 4% Liquid 1 Application(s) Topical <User Schedule>  clonazePAM Tablet 2 milliGRAM(s) Oral at bedtime  enoxaparin Injectable 40 milliGRAM(s) SubCutaneous daily  famotidine    Tablet 20 milliGRAM(s) Oral daily  FIRST- Mouthwash  BLM 5 milliLiter(s) Swish and Spit daily  furosemide Solution 20 milliGRAM(s) Oral daily  insulin lispro (HumaLOG) corrective regimen sliding scale   SubCutaneous every 6 hours  iron sucrose IVPB 100 milliGRAM(s) IV Intermittent every 24 hours  lidocaine   Patch 1 Patch Transdermal every 24 hours  melatonin 3 milliGRAM(s) Oral <User Schedule>  mirtazapine 45 milliGRAM(s) Oral <User Schedule>  multivitamin/minerals 1 Tablet(s) Oral daily  predniSONE   Tablet 10 milliGRAM(s) Oral every 24 hours  QUEtiapine 50 milliGRAM(s) Oral <User Schedule>  sertraline 50 milliGRAM(s) Oral daily  sodium chloride 0.9%. 1000 milliLiter(s) (10 mL/Hr) IV Continuous <Continuous>  vancomycin  IVPB 1000 milliGRAM(s) IV Intermittent every 24 hours    MEDICATIONS  (PRN):  acetaminophen    Suspension. 650 milliGRAM(s) Enteral Tube every 6 hours PRN Mild Pain (1 - 3)        EXAM:  ICU Vital Signs Last 24 Hrs  T(C): 37.1 (17 Aug 2018 03:00), Max: 37.1 (16 Aug 2018 19:00)  T(F): 98.8 (17 Aug 2018 03:00), Max: 98.8 (16 Aug 2018 19:00)  HR: 114 (17 Aug 2018 12:28) (86 - 115)  BP: 140/63 (17 Aug 2018 09:00) (110/57 - 170/79)  BP(mean): 90 (17 Aug 2018 09:00) (78 - 117)  ABP: --  ABP(mean): --  RR: 18 (17 Aug 2018 09:00) (13 - 33)  SpO2: 100% (17 Aug 2018 12:28) (75% - 100%)      GENERAL: The patient is awake and alert in no apparent distress.     LUNGS: some crackles at bases      LABS/IMAGING: reviewed                                   9.3    9.9   )-----------( 413      ( 17 Aug 2018 03:16 )             28.6   08-17    137  |  102  |  37<H>  ----------------------------<  115<H>  4.5   |  22  |  0.87    Ca    8.2<L>      17 Aug 2018 03:16  Phos  3.2     08-17  Mg     2.0     08-17        < from: Xray Chest 1 View- PORTABLE-Urgent (08.09.18 @ 09:02) >    IMPRESSION:     The enteric tube is in place with its tip in the stomach.    Minimal improvement of the hazy opacities in the right mid to upper lung.    < end of copied text >          PROBLEM LIST:  68y Female with HEALTH ISSUES - PROBLEM Dx:  Fever, unspecified fever cause: Fever, unspecified fever cause  Anxiety: Anxiety  IGT (impaired glucose tolerance): IGT (impaired glucose tolerance)  Metabolic acidosis, NAG, bicarbonate losses: Metabolic acidosis, NAG, bicarbonate losses  Hypokalemia: Hypokalemia  Sepsis due to methicillin susceptible Staphylococcus aureus: Sepsis due to methicillin susceptible Staphylococcus aureus  Hyponatremia: Hyponatremia  Pelvic fracture: Pelvic fracture  Asthma: Asthma  Electrolyte abnormality: Electrolyte abnormality  Troponin level elevated: Troponin level elevated  Hypoglycemia: Hypoglycemia  Metabolic acidosis with increased anion gap and accumulation of organic acids: Metabolic acidosis with increased anion gap and accumulation of organic acids  Urinary frequency: Urinary frequency  Toxic metabolic encephalopathy: Toxic metabolic encephalopathy  Acute back pain with sciatica, left: Acute back pain with sciatica, left  Severe sepsis: Severe sepsis  Weakness of left lower extremity: Weakness of left lower extremity      RECS:  -appreciate SICU and surgical care  -pulmicort and duoneb, for asthma  -ID follow up        Luciana Mancini MD   [phone]

## 2018-08-17 NOTE — PROGRESS NOTE ADULT - ASSESSMENT
69 yo female with dementia, history of UC, and s/p RT Hip hemiarthroplasty 6/22/18  postsurgical hematoma with secondary infection  Admitted with low grade fever, rigors, increased confusion, found to have leukocytosis, 20% bands, ICU, pressor use   Bld Cxs 7/26-8/1 still positive all MRSA  s/p R hip I&D, lavage, poly exchange- pus encountered, all specimens S aureus.Initial surgery 7/27, repeat debridement 8/4, now with spacer  Afebrile, Creat stable  Dapto and ceftaroline used for a short time,concerns of rash led to dapto and rifampin, fever led to switch to vanco as sole MRSA agent  GAYATHRI: no evidence of valvular vegetation  s/p return to OR, removal of hardware/spacer,  perhaps effective source control on 8/4  leukocytosis has resolved, etiology of recent  fevers not clear, have resolved  Candida in urine is likely a colonizer, no pressing need to treat it.  Meropenem use is empiric for possible GNR infection, although CXR has been clear, scant ET secretions, and urine has grown yeast on 8/9 and less than 10,000 on 8/12  Meropenem course completed 8/16  Plan:  1.continue vanco, day 13 of planned 42 day course  2.Monitor off fluconazole and meropenem  3.will need vanco trough in a few days  4.Supportive respiratory care per ICU ,tenuous status

## 2018-08-17 NOTE — PROGRESS NOTE ADULT - SUBJECTIVE AND OBJECTIVE BOX
CC: f/u for MRSA Rt prosthetic joint infection and high grade bactermia    Patient reports: she is lethargic, on BIPAP mask    REVIEW OF SYSTEMS:  All other review of systems negative (Comprehensive ROS)    Antimicrobials Day #  :vanco day 13  vancomycin  IVPB 1000 milliGRAM(s) IV Intermittent every 24 hours    Other Medications Reviewed    T(F): 98.8 (08-17-18 @ 03:00), Max: 98.8 (08-16-18 @ 19:00)  HR: 101 (08-17-18 @ 10:01)  BP: 140/63 (08-17-18 @ 09:00)  RR: 18 (08-17-18 @ 09:00)  SpO2: 100% (08-17-18 @ 10:01)  Wt(kg): --    PHYSICAL EXAM:  General: sleepy, no acute distress  Eyes:  anicteric, no conjunctival injection, no discharge  Oropharynx: no lesions or injection 	  Neck: supple, without adenopathy  Lungs: distant BS  Heart: regular rate and rhythm; no murmur, rubs or gallops  Abdomen: soft, nondistended, nontender, without mass or organomegaly  Skin: no lesions  Extremities: no clubbing, cyanosis, + edema  Neurologic: lethargic, poorly interactive  Left arm PICC  LAB RESULTS:                        9.3    9.9   )-----------( 413      ( 17 Aug 2018 03:16 )             28.6     08-17    137  |  102  |  37<H>  ----------------------------<  115<H>  4.5   |  22  |  0.87    Ca    8.2<L>      17 Aug 2018 03:16  Phos  3.2     08-17  Mg     2.0     08-17          MICROBIOLOGY:  RECENT CULTURES:      RADIOLOGY REVIEWED:    < from: Xray Chest 1 View- PORTABLE-Urgent (08.16.18 @ 19:32) >  INTERPRETATION:  AP chest x-ray at 1932 hours on 16 August.    Clinical information: Shortness of breath.    Comparison: Chest x-ray at 0719 hours on 16 August.    Findings: Enteric tube and left-sided PICC line remain in good position.    The heart is enlarged. Thoracic aorta is calcified. Central pulmonary   arteries are prominent. Peripheral pulmonary vascularity appears normal.   No consolidation or pleural effusion is seen.    Impression: Clear lungs.

## 2018-08-17 NOTE — PROGRESS NOTE ADULT - SUBJECTIVE AND OBJECTIVE BOX
Patient seen and examined. Pain controlled.    Physical exam  VS: see EMR  Gen: NAD  Right LE: Dressing clean, dry, and intact. +EHL/FHL/TA/GSC. SILT L3-S1. +Capillary refill brisk. Compartments soft and compressible.

## 2018-08-17 NOTE — PROGRESS NOTE ADULT - SUBJECTIVE AND OBJECTIVE BOX
---___---___---___---___---___---___ ---___---___---___---___---___---___---___---___---___---                    <<<  M E D I C A L   A T T E N D I N G    F O L L O W    U P   N O T E  >>>  gets agitated at night and need bipap . now getting noticeably weaker unable to hold her head up    ---___---___---___---___---___---      <<<  MEDICATIONS:  >>>    MEDICATIONS  (STANDING):  acetylcysteine 20% Inhalation 3 milliLiter(s) Inhalation every 6 hours  ALBUTerol/ipratropium for Nebulization 3 milliLiter(s) Nebulizer every 6 hours  ascorbic acid 500 milliGRAM(s) Oral daily  buDESOnide   0.5 milliGRAM(s) Respule 0.5 milliGRAM(s) Inhalation every 12 hours  chlorhexidine 4% Liquid 1 Application(s) Topical <User Schedule>  clonazePAM Tablet 2 milliGRAM(s) Oral at bedtime  enoxaparin Injectable 40 milliGRAM(s) SubCutaneous daily  famotidine    Tablet 20 milliGRAM(s) Oral daily  FIRST- Mouthwash  BLM 5 milliLiter(s) Swish and Spit daily  furosemide Solution 20 milliGRAM(s) Oral daily  insulin lispro (HumaLOG) corrective regimen sliding scale   SubCutaneous every 6 hours  iron sucrose IVPB 100 milliGRAM(s) IV Intermittent every 24 hours  lidocaine   Patch 1 Patch Transdermal every 24 hours  melatonin 3 milliGRAM(s) Oral <User Schedule>  mirtazapine 45 milliGRAM(s) Oral <User Schedule>  multivitamin/minerals 1 Tablet(s) Oral daily  predniSONE   Tablet 10 milliGRAM(s) Oral every 24 hours  QUEtiapine 50 milliGRAM(s) Oral <User Schedule>  sertraline 50 milliGRAM(s) Oral daily  sodium chloride 0.9%. 1000 milliLiter(s) (10 mL/Hr) IV Continuous <Continuous>  vancomycin  IVPB 1000 milliGRAM(s) IV Intermittent every 24 hours      MEDICATIONS  (PRN):  acetaminophen    Suspension. 650 milliGRAM(s) Enteral Tube every 6 hours PRN Mild Pain (1 - 3)       ---___---___---___---___---___---     <<<REVIEW OF SYSTEM: >>>    GEN: no fever, no chills, no pain  RESP: no SOB, no cough, no sputum  CVS: no chest pain, no palpitations, no edema  GI: no abdominal pain, no nausea, no vomiting, no constipation, no diarrhea  : no dysurea, no frequency  NEURO: no headache, no dizziness  PSYCH: no depression, not anxious  Derm : no itching, no rash     ---___---___---___---___---___---          <<<  VITAL SIGNS: >>>    T(F): 98.8 (08-17-18 @ 03:00), Max: 98.8 (08-16-18 @ 19:00)  HR: 114 (08-17-18 @ 12:28) (86 - 115)  BP: 140/63 (08-17-18 @ 09:00) (110/57 - 170/79)  RR: 18 (08-17-18 @ 09:00) (13 - 33)  SpO2: 100% (08-17-18 @ 12:28) (75% - 100%)  Wt(kg): --  CAPILLARY BLOOD GLUCOSE      POCT Blood Glucose.: 119 mg/dL (17 Aug 2018 05:37)    I&O's Summary    16 Aug 2018 07:01  -  17 Aug 2018 07:00  --------------------------------------------------------  IN: 1270 mL / OUT: 2130 mL / NET: -860 mL         ---___---___---___---___---___---                       PHYSICAL EXAM:    GEN: A&O X 3 , NAD , comfortable  HEENT: NCAT, PERRL, MMM, no scleral icterus, hearing intact ngt noted   NECK: Supple, No JVD  CVS: S1S2 , regular , No M/R/G appreciated  PULM: CTA B/L,  no W/R/R appreciated  ABD.: soft. non tender, non distended,  bowel sounds present  Extrem: intact pulses , no edema noted  Derm: No rash or ecchymosis noted  PSYCH: normal mood, no depression, anxious     ---___---___---___---___---___---     <<<  LAB AND IMAGING: >>>                          9.3    9.9   )-----------( 413      ( 17 Aug 2018 03:16 )             28.6               08-17    137  |  102  |  37<H>  ----------------------------<  115<H>  4.5   |  22  |  0.87    Ca    8.2<L>      17 Aug 2018 03:16  Phos  3.2     08-17  Mg     2.0     08-17                                 [All pertinent / recent available Imaging reports and other labs reviewed]     ---___---___---___---___---___---___ ---___---___---___---___---           <<<  A S S E S S M E N T   A N D   P L A N :  >>>          -GI/DVT Prophylaxis.    --------------------------------------------  Case discussed with   Education given on     >>______________________<<      Deniz Bolden .         phone   [phone]

## 2018-08-18 LAB
ANION GAP SERPL CALC-SCNC: 15 MMOL/L — SIGNIFICANT CHANGE UP (ref 5–17)
BASE EXCESS BLDV CALC-SCNC: 3.4 MMOL/L — HIGH (ref -2–2)
BUN SERPL-MCNC: 36 MG/DL — HIGH (ref 7–23)
CA-I BLD-SCNC: 1.2 MMOL/L — SIGNIFICANT CHANGE UP (ref 1.12–1.3)
CA-I SERPL-SCNC: 1.14 MMOL/L — SIGNIFICANT CHANGE UP (ref 1.12–1.3)
CALCIUM SERPL-MCNC: 8.2 MG/DL — LOW (ref 8.4–10.5)
CHLORIDE BLDV-SCNC: 107 MMOL/L — SIGNIFICANT CHANGE UP (ref 96–108)
CHLORIDE SERPL-SCNC: 103 MMOL/L — SIGNIFICANT CHANGE UP (ref 96–108)
CO2 BLDV-SCNC: 29 MMOL/L — SIGNIFICANT CHANGE UP (ref 22–30)
CO2 SERPL-SCNC: 23 MMOL/L — SIGNIFICANT CHANGE UP (ref 22–31)
CREAT SERPL-MCNC: 0.95 MG/DL — SIGNIFICANT CHANGE UP (ref 0.5–1.3)
GAS PNL BLDV: 136 MMOL/L — SIGNIFICANT CHANGE UP (ref 136–145)
GAS PNL BLDV: SIGNIFICANT CHANGE UP
GAS PNL BLDV: SIGNIFICANT CHANGE UP
GLUCOSE BLDV-MCNC: 121 MG/DL — HIGH (ref 70–99)
GLUCOSE SERPL-MCNC: 119 MG/DL — HIGH (ref 70–99)
HCO3 BLDV-SCNC: 27 MMOL/L — SIGNIFICANT CHANGE UP (ref 21–29)
HCT VFR BLD CALC: 28.7 % — LOW (ref 34.5–45)
HCT VFR BLDA CALC: 30 % — LOW (ref 39–50)
HGB BLD CALC-MCNC: 9.6 G/DL — LOW (ref 11.5–15.5)
HGB BLD-MCNC: 9.3 G/DL — LOW (ref 11.5–15.5)
HOROWITZ INDEX BLDV+IHG-RTO: 28 — SIGNIFICANT CHANGE UP
LACTATE BLDV-MCNC: 0.6 MMOL/L — LOW (ref 0.7–2)
MAGNESIUM SERPL-MCNC: 1.8 MG/DL — SIGNIFICANT CHANGE UP (ref 1.6–2.6)
MCHC RBC-ENTMCNC: 29.8 PG — SIGNIFICANT CHANGE UP (ref 27–34)
MCHC RBC-ENTMCNC: 32.5 GM/DL — SIGNIFICANT CHANGE UP (ref 32–36)
MCV RBC AUTO: 91.6 FL — SIGNIFICANT CHANGE UP (ref 80–100)
OTHER CELLS CSF MANUAL: 10 ML/DL — LOW (ref 18–22)
PCO2 BLDV: 41 MMHG — SIGNIFICANT CHANGE UP (ref 35–50)
PH BLDV: 7.44 — SIGNIFICANT CHANGE UP (ref 7.35–7.45)
PHOSPHATE SERPL-MCNC: 4.3 MG/DL — SIGNIFICANT CHANGE UP (ref 2.5–4.5)
PLATELET # BLD AUTO: 547 K/UL — HIGH (ref 150–400)
PO2 BLDV: 44 MMHG — SIGNIFICANT CHANGE UP (ref 25–45)
POTASSIUM BLDV-SCNC: 4 MMOL/L — SIGNIFICANT CHANGE UP (ref 3.5–5.3)
POTASSIUM SERPL-MCNC: 4.3 MMOL/L — SIGNIFICANT CHANGE UP (ref 3.5–5.3)
POTASSIUM SERPL-SCNC: 4.3 MMOL/L — SIGNIFICANT CHANGE UP (ref 3.5–5.3)
RBC # BLD: 3.14 M/UL — LOW (ref 3.8–5.2)
RBC # FLD: 14.3 % — SIGNIFICANT CHANGE UP (ref 10.3–14.5)
SAO2 % BLDV: 78 % — SIGNIFICANT CHANGE UP (ref 67–88)
SODIUM SERPL-SCNC: 141 MMOL/L — SIGNIFICANT CHANGE UP (ref 135–145)
VANCOMYCIN TROUGH SERPL-MCNC: 23.1 UG/ML — HIGH (ref 10–20)
WBC # BLD: 9.4 K/UL — SIGNIFICANT CHANGE UP (ref 3.8–10.5)
WBC # FLD AUTO: 9.4 K/UL — SIGNIFICANT CHANGE UP (ref 3.8–10.5)

## 2018-08-18 PROCEDURE — 99291 CRITICAL CARE FIRST HOUR: CPT

## 2018-08-18 PROCEDURE — 71045 X-RAY EXAM CHEST 1 VIEW: CPT | Mod: 26

## 2018-08-18 RX ORDER — MAGNESIUM SULFATE 500 MG/ML
2 VIAL (ML) INJECTION ONCE
Qty: 0 | Refills: 0 | Status: COMPLETED | OUTPATIENT
Start: 2018-08-18 | End: 2018-08-18

## 2018-08-18 RX ORDER — DIPHENHYDRAMINE HCL 50 MG
12.5 CAPSULE ORAL ONCE
Qty: 0 | Refills: 0 | Status: COMPLETED | OUTPATIENT
Start: 2018-08-18 | End: 2018-08-18

## 2018-08-18 RX ADMIN — Medication 2 MILLIGRAM(S): at 21:19

## 2018-08-18 RX ADMIN — SERTRALINE 50 MILLIGRAM(S): 25 TABLET, FILM COATED ORAL at 12:55

## 2018-08-18 RX ADMIN — Medication 3 MILLILITER(S): at 11:53

## 2018-08-18 RX ADMIN — Medication 3 MILLILITER(S): at 17:01

## 2018-08-18 RX ADMIN — CHLORHEXIDINE GLUCONATE 1 APPLICATION(S): 213 SOLUTION TOPICAL at 05:25

## 2018-08-18 RX ADMIN — Medication 50 GRAM(S): at 04:02

## 2018-08-18 RX ADMIN — Medication 650 MILLIGRAM(S): at 05:25

## 2018-08-18 RX ADMIN — Medication 3 MILLILITER(S): at 00:15

## 2018-08-18 RX ADMIN — Medication 2: at 23:50

## 2018-08-18 RX ADMIN — Medication 0.5 MILLIGRAM(S): at 05:30

## 2018-08-18 RX ADMIN — Medication 12.5 MILLIGRAM(S): at 22:33

## 2018-08-18 RX ADMIN — LIDOCAINE 1 PATCH: 4 CREAM TOPICAL at 00:11

## 2018-08-18 RX ADMIN — Medication 650 MILLIGRAM(S): at 05:55

## 2018-08-18 RX ADMIN — Medication 500 MILLIGRAM(S): at 12:55

## 2018-08-18 RX ADMIN — ENOXAPARIN SODIUM 40 MILLIGRAM(S): 100 INJECTION SUBCUTANEOUS at 12:56

## 2018-08-18 RX ADMIN — Medication 0.5 MILLIGRAM(S): at 17:01

## 2018-08-18 RX ADMIN — IRON SUCROSE 210 MILLIGRAM(S): 20 INJECTION, SOLUTION INTRAVENOUS at 17:47

## 2018-08-18 RX ADMIN — QUETIAPINE FUMARATE 50 MILLIGRAM(S): 200 TABLET, FILM COATED ORAL at 21:19

## 2018-08-18 RX ADMIN — Medication 20 MILLIGRAM(S): at 05:25

## 2018-08-18 RX ADMIN — Medication 3 MILLILITER(S): at 05:32

## 2018-08-18 RX ADMIN — LIDOCAINE 1 PATCH: 4 CREAM TOPICAL at 12:55

## 2018-08-18 RX ADMIN — Medication 10 MILLIGRAM(S): at 12:55

## 2018-08-18 RX ADMIN — Medication 3 MILLIGRAM(S): at 21:19

## 2018-08-18 RX ADMIN — DIPHENHYDRAMINE HYDROCHLORIDE AND LIDOCAINE HYDROCHLORIDE AND ALUMINUM HYDROXIDE AND MAGNESIUM HYDRO 5 MILLILITER(S): KIT at 12:54

## 2018-08-18 RX ADMIN — SODIUM CHLORIDE 10 MILLILITER(S): 9 INJECTION INTRAMUSCULAR; INTRAVENOUS; SUBCUTANEOUS at 04:02

## 2018-08-18 RX ADMIN — Medication 1 TABLET(S): at 12:54

## 2018-08-18 RX ADMIN — MIRTAZAPINE 45 MILLIGRAM(S): 45 TABLET, ORALLY DISINTEGRATING ORAL at 21:20

## 2018-08-18 RX ADMIN — Medication 3 MILLILITER(S): at 05:31

## 2018-08-18 RX ADMIN — FAMOTIDINE 20 MILLIGRAM(S): 10 INJECTION INTRAVENOUS at 12:55

## 2018-08-18 RX ADMIN — Medication 1: at 17:48

## 2018-08-18 NOTE — PROGRESS NOTE ADULT - ASSESSMENT
69 yo female with dementia, history of UC, and s/p RT Hip hemiarthroplasty 6/22/18  postsurgical hematoma with secondary infection  Admitted with low grade fever, rigors, increased confusion, found to have leukocytosis, 20% bands, ICU, pressor use   Bld Cxs 7/26-8/1 still positive all MRSA  s/p R hip I&D, lavage, poly exchange- pus encountered, all specimens S aureus.Initial surgery 7/27, repeat debridement 8/4, now with spacer  Afebrile, Creat stable  Dapto and ceftaroline used for a short time,concerns of rash led to dapto and rifampin, fever led to switch to vanco as sole MRSA agent  GAYATHRI: no evidence of valvular vegetation  s/p return to OR, removal of hardware/spacer,  perhaps effective source control on 8/4  leukocytosis has resolved, etiology of recent  fevers not clear, have resolved  Candida in urine is likely a colonizer, no pressing need to treat it.  Meropenem usewas empiric for possible GNR infection, although CXR has been clear, scant ET secretions, and urine has grown yeast on 8/9 and less than 10,000 on 8/12  Meropenem course completed 8/16  New rash of unclear etiology, rashes to vancomycin not common, ? delayed reaction to prior meropenem  Plan:  1.continue vanco, day 14 of planned 42 day course  2.Monitor off fluconazole and meropenem  3.vanco on hold due to elevated level,,would satrt at a deduced dose when level less than 15  4.Supportive respiratory care per ICU ,tenuous status

## 2018-08-18 NOTE — PROGRESS NOTE ADULT - SUBJECTIVE AND OBJECTIVE BOX
CC: f/u for MRSA bacteremia and rt prosthetic hip infection    Patient reports: she is confused, respiratory status stable on supplemental oxygen    REVIEW OF SYSTEMS:  All other review of systems negative (Comprehensive ROS): limited by mental status, she has a more impressive rash    Antimicrobials Day #  :day 14    Other Medications Reviewed    T(F): 97.5 (08-18-18 @ 03:00), Max: 98.6 (08-17-18 @ 15:00)  HR: 103 (08-18-18 @ 05:31)  BP: 118/55 (08-18-18 @ 05:00)  RR: 20 (08-18-18 @ 05:00)  SpO2: 97% (08-18-18 @ 05:31)  Wt(kg): --    PHYSICAL EXAM:  General: alert, no acute distress  Eyes:  anicteric, no conjunctival injection, no discharge  Oropharynx: no lesions or injection 	  Neck: supple, without adenopathy  Lungs: diminished at bases  Heart: regular rate and rhythm; no murmur,  Abdomen: soft, nondistended, nontender, without mass or organomegaly  Skin: generalized erythrodermatous type rash  Extremities: no clubbing, cyanosis, trace edema  Neurologic: alert, confused, moves all extremities.  hip incision dressing dry.    LAB RESULTS:                        9.3    9.4   )-----------( 547      ( 18 Aug 2018 02:41 )             28.7     08-18    141  |  103  |  36<H>  ----------------------------<  119<H>  4.3   |  23  |  0.95    Ca    8.2<L>      18 Aug 2018 02:53  Phos  4.3     08-18  Mg     1.8     08-18    Vanco trough 8/16 =26      MICROBIOLOGY:  RECENT CULTURES:      RADIOLOGY REVIEWED:    < from: Xray Chest 1 View- PORTABLE-Routine (08.17.18 @ 07:31) >  INTERPRETATION:  AP chest x-ray at 0704 hours on 17 August.    Clinical information: Atelectasis.    Comparison: Chest x-ray dated 16 August.    Findings: Study is limited due to patient positioning obscuring most of   the right hemithorax. Enteric tube and left-sided PICC line persist. Left   lung appears clear.    Impression: Limited study.    Clear left lung.    < end of copied text >

## 2018-08-18 NOTE — PROGRESS NOTE ADULT - SUBJECTIVE AND OBJECTIVE BOX
24 h: Pt taken off BiPAP in the morning and remained off all day and night. She was given an additional 20mg IV Lasix. Tube feeds restarted Pivot @ 30. Vancomycin was held because trough was 26. 24 HOUR EVENTS: Pt taken off BiPAP in the morning and remained off all day and night. She was given an additional 20mg IV Lasix. Tube feeds restarted Pivot @ 30. Vancomycin was held because trough was 26.    HISTORY  68 year old female with a past medical history of asthma on steroids, CVA (no residual deficits), pulmonary HTN, HLD, GERD, ulcerative colitis, fatty pancreas, impaired glucose tolerance, anxiety, and depression who presented on 7/26/2018 with altered mental status, rigors, and urinary frequency. Of note, patient was recently hospitalized for a right femoral neck fracture secondary to osteoporosis s/p hemiarthroplasty on 6/22/2018. She had been taking Plavix for her history of CVA and Lovenox for VTE prophylaxis but she developed a hematoma at her surgical site so the Lovenox was stopped. Since then, patient has been progressively more agitated, confused, and weak. Additionally, patient has been complaining of new onset low back pain and left hip pain. In the ED, patient was hemodynamically stable but noted to be hypoglycemic. She was also started on meropenem & vancomycin for concern of sepsis. CT scan revealed a 17 cm collection adjacent to the right hip prosthesis, an acute L2 compression fracture, and new left acetabular fracture. Patient was admitted to medicine but on 7/27/2018, patient was noted to be more altered, rigorous, tachypneic, febrile to 101 F, and hypotensive w/ SBP in the 60s so an RRT was called. She was more fluid and started on a norepinephrine gtt. Patient subsequently admitted to SICU for hemodynamic monitoring. Blood cultures positive for MRSA. Decision was made to take the patient to the OR on 7/27/2018 for right hip I&D and exchange of the femoral head. She returned to the SICU intubated on vasopressor support. She was extubated on 7/30/2018 but was reintubated on 8/2/2018. Additionally, patient was persistently on vasopressor support and bacteremic with MRSA. Patient was subsequently taken back to the OR on 8/4/2018 for further right hip I&D, explantation of right hemiarthroplasty, placement of antibiotic spacer, and right femur ORIF. Patient was subsequently weaned off vasopressor support on 8/5/2018 and extubated on 8/6/2018 but required reintubation (2nd time) for tachypnea with accessory muscle use on 8/11/2018. Pt subsequently extubated 8/15/18.      SUBJECTIVE/ROS:  [ x] A ten-point review of systems was otherwise negative except as noted.  [ ] Due to altered mental status/intubation, subjective information were not able to be obtained from the patient. History was obtained, to the extent possible, from review of the chart and collateral sources of information.      NEURO  Exam: awake, alert, follows commands, AAO x2  Meds: acetaminophen    Suspension. 650 milliGRAM(s) Enteral Tube every 6 hours PRN Mild Pain (1 - 3)  clonazePAM Tablet 2 milliGRAM(s) Oral at bedtime  melatonin 3 milliGRAM(s) Oral <User Schedule>  mirtazapine 45 milliGRAM(s) Oral <User Schedule>  QUEtiapine 50 milliGRAM(s) Oral <User Schedule>  sertraline 50 milliGRAM(s) Oral daily    [x] Adequacy of sedation and pain control has been assessed and adjusted      RESPIRATORY  RR: 19 (08-18-18 @ 04:00) (18 - 33)  SpO2: 100% (08-18-18 @ 04:00) (52% - 100%)  Exam: unlabored, clear to auscultation bilaterally  Meds: acetylcysteine 20% Inhalation 3 milliLiter(s) Inhalation every 6 hours  ALBUTerol/ipratropium for Nebulization 3 milliLiter(s) Nebulizer every 6 hours  buDESOnide   0.5 milliGRAM(s) Respule 0.5 milliGRAM(s) Inhalation every 12 hours        CARDIOVASCULAR  HR: 106 (08-18-18 @ 04:00) (99 - 116)  BP: 128/58 (08-18-18 @ 04:00) (99/54 - 181/72)  BP(mean): 84 (08-18-18 @ 04:00) (74 - 114)    VBG - ( 18 Aug 2018 02:44 )  pH: 7.44  /  pCO2: 41    /  pO2: 44    / HCO3: 27    / Base Excess: 3.4   /  SaO2: 78     Lactate: 0.6      Exam: regular rate and rhythm  Cardiac Rhythm: sinus  Perfusion     [ x]Adequate   [ ]Inadequate  Mentation   [x ]Normal       [ ]Reduced  Extremities  [ x]Warm         [ ]Cool  Volume Status [x ]Hypervolemic [ ]Euvolemic [ ]Hypovolemic  Meds: furosemide Solution 20 milliGRAM(s) Oral daily        GI/NUTRITION  Exam: soft, nontender, nondistended  Diet: NPO with Pivot @ 30  Meds: famotidine    Tablet 20 milliGRAM(s) Oral daily      GENITOURINARY  I&O's Detail    08-16 @ 07:01  -  08-17 @ 07:00  --------------------------------------------------------  IN:    Enteral Tube Flush: 60 mL    IV PiggyBack: 300 mL    Packed Red Blood Cells: 320 mL    Pivot: 360 mL    sodium chloride 0.9%.: 230 mL  Total IN: 1270 mL    OUT:    Indwelling Catheter - Urethral: 2130 mL  Total OUT: 2130 mL    Total NET: -860 mL      08-17 @ 07:01 - 08-18 @ 04:38  --------------------------------------------------------  IN:    Enteral Tube Flush: 30 mL    IV PiggyBack: 50 mL    Pivot: 390 mL    sodium chloride 0.9%.: 220 mL  Total IN: 690 mL    OUT:    Indwelling Catheter - Urethral: 2675 mL  Total OUT: 2675 mL    Total NET: -1985 mL          08-18    141  |  103  |  36<H>  ----------------------------<  119<H>  4.3   |  23  |  0.95    Ca    8.2<L>      18 Aug 2018 02:53  Phos  4.3     08-18  Mg     1.8     08-18      [x ] Blackman catheter, indication: retention  Meds: ascorbic acid 500 milliGRAM(s) Oral daily  iron sucrose IVPB 100 milliGRAM(s) IV Intermittent every 24 hours  multivitamin/minerals 1 Tablet(s) Oral daily  sodium chloride 0.9%. 1000 milliLiter(s) IV Continuous <Continuous>        HEMATOLOGIC  Meds: enoxaparin Injectable 40 milliGRAM(s) SubCutaneous daily    [x] VTE Prophylaxis                        9.3    9.4   )-----------( 547      ( 18 Aug 2018 02:41 )             28.7       Transfusion     [ ] PRBC   [ ] Platelets   [ ] FFP   [ ] Cryoprecipitate      INFECTIOUS DISEASES  T(C): 36.4 (08-18-18 @ 03:00), Max: 37 (08-17-18 @ 15:00)  WBC Count: 9.4 K/uL (08-18 @ 02:41)    Recent Cultures:  Specimen Source: .Urine Catheterized, 08-12 @ 05:20; Results   <10,000 CFU/ml  Normal Urogenital vince present; Gram Stain: --; Organism: --  Specimen Source: .Blood Blood, 08-12 @ 00:16; Results   No growth at 5 days.; Gram Stain: --; Organism: --  Meds: x      ENDOCRINE  Capillary Blood Glucose  POCT Blood Glucose.: 169 mg/dL (17 Aug 2018 23:04)  POCT Blood Glucose.: 128 mg/dL (17 Aug 2018 18:44)  POCT Blood Glucose.: 119 mg/dL (17 Aug 2018 05:37)    Meds: insulin lispro (HumaLOG) corrective regimen sliding scale   SubCutaneous every 6 hours  predniSONE   Tablet 10 milliGRAM(s) Oral every 24 hours        ACCESS DEVICES:  [ x] Peripheral IV  [ ] Central Venous Line	[ ] R	[ ] L	[ ] IJ	[ ] Fem	[ ] SC	Placed:   [ ] Arterial Line		[ ] R	[ ] L	[ ] Fem	[ ] Rad	[ ] Ax	Placed:   [x ] PICC:	L basilic 8/14/18				[ ] Mediport  [ x] Urinary Catheter, Date Placed: 8/9/18  [x ] Necessity of urinary, arterial, and venous catheters discussed    OTHER MEDICATIONS:  chlorhexidine 4% Liquid 1 Application(s) Topical <User Schedule>  FIRST- Mouthwash  BLM 5 milliLiter(s) Swish and Spit daily  lidocaine   Patch 1 Patch Transdermal every 24 hours      CODE STATUS: full code    IMAGING:

## 2018-08-18 NOTE — PROGRESS NOTE ADULT - SUBJECTIVE AND OBJECTIVE BOX
---___---___---___---___---___---___ ---___---___---___---___---___---___---___---___---___---                    <<<  M E D I C A L   A T T E N D I N G    F O L L O W    U P   N O T E  >>>    patient inbed no acute distress    ---___---___---___---___---___---      <<<  MEDICATIONS:  >>>    MEDICATIONS  (STANDING):  acetylcysteine 20% Inhalation 3 milliLiter(s) Inhalation every 6 hours  ALBUTerol/ipratropium for Nebulization 3 milliLiter(s) Nebulizer every 6 hours  ascorbic acid 500 milliGRAM(s) Oral daily  buDESOnide   0.5 milliGRAM(s) Respule 0.5 milliGRAM(s) Inhalation every 12 hours  chlorhexidine 4% Liquid 1 Application(s) Topical <User Schedule>  clonazePAM Tablet 2 milliGRAM(s) Oral at bedtime  enoxaparin Injectable 40 milliGRAM(s) SubCutaneous daily  famotidine    Tablet 20 milliGRAM(s) Oral daily  FIRST- Mouthwash  BLM 5 milliLiter(s) Swish and Spit daily  furosemide Solution 20 milliGRAM(s) Oral daily  insulin lispro (HumaLOG) corrective regimen sliding scale   SubCutaneous every 6 hours  iron sucrose IVPB 100 milliGRAM(s) IV Intermittent every 24 hours  lidocaine   Patch 1 Patch Transdermal every 24 hours  melatonin 3 milliGRAM(s) Oral <User Schedule>  mirtazapine 45 milliGRAM(s) Oral <User Schedule>  multivitamin/minerals 1 Tablet(s) Oral daily  predniSONE   Tablet 10 milliGRAM(s) Oral every 24 hours  QUEtiapine 50 milliGRAM(s) Oral <User Schedule>  sertraline 50 milliGRAM(s) Oral daily  sodium chloride 0.9%. 1000 milliLiter(s) (10 mL/Hr) IV Continuous <Continuous>      MEDICATIONS  (PRN):  acetaminophen    Suspension. 650 milliGRAM(s) Enteral Tube every 6 hours PRN Mild Pain (1 - 3)       ---___---___---___---___---___---     <<<REVIEW OF SYSTEM: >>>    unable to obtain    ---___---___---___---___---___---          <<<  VITAL SIGNS: >>>    T(F): 97.7 (08-18-18 @ 11:00), Max: 97.9 (08-18-18 @ 07:00)  HR: 110 (08-18-18 @ 17:05) (99 - 121)  BP: 206/132 (08-18-18 @ 16:00) (99/54 - 206/132)  RR: 31 (08-18-18 @ 16:00) (18 - 37)  SpO2: 100% (08-18-18 @ 17:05) (96% - 100%)  Wt(kg): --  CAPILLARY BLOOD GLUCOSE      POCT Blood Glucose.: 149 mg/dL (18 Aug 2018 13:12)    I&O's Summary    17 Aug 2018 07:01  -  18 Aug 2018 07:00  --------------------------------------------------------  IN: 820 mL / OUT: 2835 mL / NET: -2015 mL    18 Aug 2018 07:01  -  18 Aug 2018 17:23  --------------------------------------------------------  IN: 50 mL / OUT: 205 mL / NET: -155 mL         ---___---___---___---___---___---                       PHYSICAL EXAM:    GEN: A&O X 2 , NAD , comfortable  HEENT: NCAT, PERRL, MMM, no scleral icterus, hearing intact  NECK: Supple, No JVD  CVS: S1S2 , regular , No M/R/G appreciated  PULM: CTA B/L,  no W/R/R appreciated  ABD.: soft. non tender, non distended,  bowel sounds present  Extrem: intact pulses , no edema noted  Derm: No rash or ecchymosis noted  PSYCH: normal mood, no depression, not anxious     ---___---___---___---___---___---     <<<  LAB AND IMAGING: >>>                          9.3    9.4   )-----------( 547      ( 18 Aug 2018 02:41 )             28.7               08-18    141  |  103  |  36<H>  ----------------------------<  119<H>  4.3   |  23  |  0.95    Ca    8.2<L>      18 Aug 2018 02:53  Phos  4.3     08-18  Mg     1.8     08-18                                 [All pertinent / recent available Imaging reports and other labs reviewed]     ---___---___---___---___---___---___ ---___---___---___---___---           <<<  A S S E S S M E N T   A N D   P L A N :  >>>          -GI/DVT Prophylaxis.    --------------------------------------------     >>______________________<<      Deniz Bolden .         phone   [phone]

## 2018-08-18 NOTE — PROGRESS NOTE ADULT - ASSESSMENT
ASSESSMENT:  68 year old female presenting with septic shock from MRSA bacteremia secondary to infected right hemiarthroplasty hardware s/p right hip I&D, explantation of right hemiarthroplasty, placement of antibiotic spacer, and right femur ORIF. Course complicated by respiratory failure requiring reintubation, and BiPAP following extubation.    PLAN:  Neuro: acute post-op pain, anxiety, depression, dementia  - Monitor mental status.   - Home regimen of Klonopin, Zoloft, Seroquel, Remeron, and melatonin for agitation related to her dementia.  - Pain control with Tylenol and Lidoderm patch.    Resp: acute respiratory distress, asthma, resolved PNA s/p course of meropenem  - Monitor pulse oximeter and ABGs.  - BiPAP as needed for respiratory distress  - Continue Lasix daily for pleural effusions, reassess daily need for IV Lasix.   - Out of bed to chair, incentive spirometry, and pulmonary toileting to prevent atelectasis.  - Home prednisone 10 mg daily, Pulmicort, and Duoneb for asthma.  - Mucomyst for thick secretions.    CV: no acute issues  - Monitor vital signs.    GI: dysphagia  - NPO with Pivot at goal of 30 mL/hr  - Famotidine for GERD.  - Bowel regimen with Colace & senna.    Renal: CKD stage 2  - Monitor I&Os.  - Monitor electrolytes and replete as necessary.  - KVO fluids.  - Lasix 20mg PO daily, reassess need for IV Lasix daily.     Heme: no acute issues  - Lovenox for VTE prophylaxis.    ID: MRSA bacteremia from infected right hip, Candiduria, possible PNA, resolved  - Monitor WBC, temperature, and procalcitonin.  - Vancomycin for MRSA bacteremia - check trough daily and resume when trough is therapeutic   - Appreciate ID recommendations.  - Reculture as clinically indicated.    Endo: hyperglycemia  - Monitor fingersticks q6hrs for hypoglycemia.    Disposition:  Full code. Will remain in SICU for respiratory monitoring.    -Denia Maddox PA-C  [id number]

## 2018-08-18 NOTE — PROGRESS NOTE ADULT - ATTENDING COMMENTS
Remains off BiPAP in last 24 hrs  More awake  Hold Vanco for high trough  Tube feed  Gentle diuresis  Continue systemic steroid BD ICS for COPD  PT  Swallow re eval at some point   at bed side kept updated

## 2018-08-18 NOTE — PROGRESS NOTE ADULT - SUBJECTIVE AND OBJECTIVE BOX
Patient seen and examined. Pain controlled. Patient sitting upright in chair.    Physical exam  VS: Afebrile, vital signs stable  Gen: NAD  Right LE: Dressing clean, dry, and intact. +EHL/FHL/TA/GSC. SILT L3-S1. +capillary refill brisk. Compartments soft and nontender.      68F s/p R hip PJI explant and placement of cement spacer w/ ORIF distal femur    Pain control  DVT ppx lovenox  Abx per ID  NWB LLE  FFWB RLE  PT/OT  Ortho stable for discharge

## 2018-08-18 NOTE — ED CLERICAL - NS ED CLERK UNITS
APER GENERAL:  well appearing, non-toxic male in no acute distress  SKIN: skin warm, pink and dry. MMM. + 3 cm circular swelling and erythema to right anterior forearm consistent with abscess. + induration, no fluctuance. no streaking.   PULM: CTAB. Normal respiratory effort. No respiratory distress. No wheezes, stridor, rales or rhonchi. No retractions  CV: RRR, no M/R/G.   MSK: FROM of right elbow and wrist. radial pulses equal and intact bilaterally. .  NEURO: A+Ox3. no UE weakness or paresthesias

## 2018-08-19 LAB
ANION GAP SERPL CALC-SCNC: 13 MMOL/L — SIGNIFICANT CHANGE UP (ref 5–17)
BUN SERPL-MCNC: 38 MG/DL — HIGH (ref 7–23)
CA-I BLD-SCNC: 1.17 MMOL/L — SIGNIFICANT CHANGE UP (ref 1.12–1.3)
CALCIUM SERPL-MCNC: 8.1 MG/DL — LOW (ref 8.4–10.5)
CHLORIDE SERPL-SCNC: 103 MMOL/L — SIGNIFICANT CHANGE UP (ref 96–108)
CO2 SERPL-SCNC: 25 MMOL/L — SIGNIFICANT CHANGE UP (ref 22–31)
CREAT SERPL-MCNC: 0.98 MG/DL — SIGNIFICANT CHANGE UP (ref 0.5–1.3)
GLUCOSE SERPL-MCNC: 169 MG/DL — HIGH (ref 70–99)
HCT VFR BLD CALC: 28.8 % — LOW (ref 34.5–45)
HGB BLD-MCNC: 8.8 G/DL — LOW (ref 11.5–15.5)
MAGNESIUM SERPL-MCNC: 2.3 MG/DL — SIGNIFICANT CHANGE UP (ref 1.6–2.6)
MCHC RBC-ENTMCNC: 28.1 PG — SIGNIFICANT CHANGE UP (ref 27–34)
MCHC RBC-ENTMCNC: 30.7 GM/DL — LOW (ref 32–36)
MCV RBC AUTO: 91.6 FL — SIGNIFICANT CHANGE UP (ref 80–100)
PHOSPHATE SERPL-MCNC: 3.1 MG/DL — SIGNIFICANT CHANGE UP (ref 2.5–4.5)
PLATELET # BLD AUTO: 650 K/UL — HIGH (ref 150–400)
POTASSIUM SERPL-MCNC: 4 MMOL/L — SIGNIFICANT CHANGE UP (ref 3.5–5.3)
POTASSIUM SERPL-SCNC: 4 MMOL/L — SIGNIFICANT CHANGE UP (ref 3.5–5.3)
RBC # BLD: 3.15 M/UL — LOW (ref 3.8–5.2)
RBC # FLD: 14.4 % — SIGNIFICANT CHANGE UP (ref 10.3–14.5)
SODIUM SERPL-SCNC: 141 MMOL/L — SIGNIFICANT CHANGE UP (ref 135–145)
VANCOMYCIN FLD-MCNC: 19.9 UG/ML — SIGNIFICANT CHANGE UP
WBC # BLD: 10.4 K/UL — SIGNIFICANT CHANGE UP (ref 3.8–10.5)
WBC # FLD AUTO: 10.4 K/UL — SIGNIFICANT CHANGE UP (ref 3.8–10.5)

## 2018-08-19 PROCEDURE — 99291 CRITICAL CARE FIRST HOUR: CPT

## 2018-08-19 PROCEDURE — 71045 X-RAY EXAM CHEST 1 VIEW: CPT | Mod: 26,76

## 2018-08-19 RX ORDER — INSULIN LISPRO 100/ML
VIAL (ML) SUBCUTANEOUS EVERY 4 HOURS
Qty: 0 | Refills: 0 | Status: DISCONTINUED | OUTPATIENT
Start: 2018-08-19 | End: 2018-08-21

## 2018-08-19 RX ORDER — VANCOMYCIN HCL 1 G
750 VIAL (EA) INTRAVENOUS EVERY 24 HOURS
Qty: 0 | Refills: 0 | Status: DISCONTINUED | OUTPATIENT
Start: 2018-08-19 | End: 2018-08-21

## 2018-08-19 RX ADMIN — QUETIAPINE FUMARATE 50 MILLIGRAM(S): 200 TABLET, FILM COATED ORAL at 22:00

## 2018-08-19 RX ADMIN — Medication 0.5 MILLIGRAM(S): at 17:30

## 2018-08-19 RX ADMIN — Medication 3 MILLILITER(S): at 00:36

## 2018-08-19 RX ADMIN — Medication 250 MILLIGRAM(S): at 13:30

## 2018-08-19 RX ADMIN — Medication 3 MILLILITER(S): at 23:18

## 2018-08-19 RX ADMIN — MIRTAZAPINE 45 MILLIGRAM(S): 45 TABLET, ORALLY DISINTEGRATING ORAL at 22:00

## 2018-08-19 RX ADMIN — Medication 2 MILLIGRAM(S): at 22:00

## 2018-08-19 RX ADMIN — LIDOCAINE 1 PATCH: 4 CREAM TOPICAL at 13:31

## 2018-08-19 RX ADMIN — Medication 3 MILLILITER(S): at 17:30

## 2018-08-19 RX ADMIN — Medication 3 MILLILITER(S): at 12:18

## 2018-08-19 RX ADMIN — Medication 0.5 MILLIGRAM(S): at 05:38

## 2018-08-19 RX ADMIN — Medication 1: at 02:18

## 2018-08-19 RX ADMIN — IRON SUCROSE 210 MILLIGRAM(S): 20 INJECTION, SOLUTION INTRAVENOUS at 17:32

## 2018-08-19 RX ADMIN — DIPHENHYDRAMINE HYDROCHLORIDE AND LIDOCAINE HYDROCHLORIDE AND ALUMINUM HYDROXIDE AND MAGNESIUM HYDRO 5 MILLILITER(S): KIT at 17:32

## 2018-08-19 RX ADMIN — Medication 1: at 15:16

## 2018-08-19 RX ADMIN — SERTRALINE 50 MILLIGRAM(S): 25 TABLET, FILM COATED ORAL at 17:32

## 2018-08-19 RX ADMIN — Medication 10 MILLIGRAM(S): at 17:32

## 2018-08-19 RX ADMIN — Medication 3 MILLILITER(S): at 05:38

## 2018-08-19 RX ADMIN — LIDOCAINE 1 PATCH: 4 CREAM TOPICAL at 00:04

## 2018-08-19 RX ADMIN — Medication 3 MILLIGRAM(S): at 22:00

## 2018-08-19 RX ADMIN — ENOXAPARIN SODIUM 40 MILLIGRAM(S): 100 INJECTION SUBCUTANEOUS at 13:30

## 2018-08-19 NOTE — PROGRESS NOTE ADULT - ASSESSMENT
67 yo female with dementia, history of UC, and s/p RT Hip hemiarthroplasty 6/22/18  postsurgical hematoma with secondary infection  Admitted with low grade fever, rigors, increased confusion, found to have leukocytosis, 20% bands, ICU, pressor use   Bld Cxs 7/26-8/1 still positive all MRSA  s/p R hip I&D, lavage, poly exchange- pus encountered, all specimens S aureus.Initial surgery 7/27, repeat debridement 8/4, now with spacer  Afebrile, Creat stable  Dapto and ceftaroline used for a short time,concerns of rash led to dapto and rifampin, fever led to switch to vanco as sole MRSA agent  GAYATHRI: no evidence of valvular vegetation  s/p return to OR, removal of hardware/spacer,  perhaps effective source control on 8/4  leukocytosis has resolved, etiology of recent  fevers not clear, have resolved  Candida in urine is likely a colonizer, no pressing need to treat it.  Meropenem use was empiric for possible GNR infection, although CXR has been clear, scant ET secretions, and urine has grown yeast on 8/9 and less than 10,000 on 8/12  Meropenem course completed 8/16  New rash of unclear etiology, rashes to vancomycin not common, ? delayed reaction to prior meropenem, it appears improved at this point  Plan:  1.continue vanco, day 15 of planned 42 day course  2.Monitor off fluconazole and meropenem  3.vanco on hold due to elevated level,,would restart  at a deduced dose when level less than 15  4.Supportive respiratory care per ICU ,tenuous status  5.suggest 500 vanco daily when level less than  15, she appears to have reduced clearance

## 2018-08-19 NOTE — PROGRESS NOTE ADULT - SUBJECTIVE AND OBJECTIVE BOX
HISTORY  68 year old female with a past medical history of asthma on steroids, CVA (no residual deficits), pulmonary HTN, HLD, GERD, ulcerative colitis, fatty pancreas, impaired glucose tolerance, anxiety, and depression who presented on 7/26/2018 with altered mental status, rigors, and urinary frequency. Of note, patient was recently hospitalized for a right femoral neck fracture secondary to osteoporosis s/p hemiarthroplasty on 6/22/2018. She had been taking Plavix for her history of CVA and Lovenox for VTE prophylaxis but she developed a hematoma at her surgical site so the Lovenox was stopped. Since then, patient has been progressively more agitated, confused, and weak. Additionally, patient has been complaining of new onset low back pain and left hip pain. In the ED, patient was hemodynamically stable but noted to be hypoglycemic. She was also started on meropenem & vancomycin for concern of sepsis. CT scan revealed a 17 cm collection adjacent to the right hip prosthesis, an acute L2 compression fracture, and new left acetabular fracture. Patient was admitted to medicine but on 7/27/2018, patient was noted to be more altered, rigorous, tachypneic, febrile to 101 F, and hypotensive w/ SBP in the 60s so an RRT was called. She was more fluid and started on a norepinephrine gtt. Patient subsequently admitted to SICU for hemodynamic monitoring. Blood cultures positive for MRSA. Decision was made to take the patient to the OR on 7/27/2018 for right hip I&D and exchange of the femoral head. She returned to the SICU intubated on vasopressor support. She was extubated on 7/30/2018 but was reintubated on 8/2/2018. Additionally, patient was persistently on vasopressor support and bacteremic with MRSA. Patient was subsequently taken back to the OR on 8/4/2018 for further right hip I&D, explantation of right hemiarthroplasty, placement of antibiotic spacer, and right femur ORIF. Patient was subsequently weaned off vasopressor support on 8/5/2018 and extubated on 8/6/2018 but required reintubation (2nd time) for tachypnea with accessory muscle use on 8/11/2018. Pt subsequently extubated 8/15/18.    24 HOUR EVENTS: Pt taken off BiPAP in the morning and remained off all day and night. She was given an additional 20mg IV Lasix. Tube feeds restarted Pivot @ 30. Vancomycin was held because trough was 26.    SUBJECTIVE/ROS:  [ ] A ten-point review of systems was otherwise negative except as noted.  [ ] Due to altered mental status/intubation, subjective information were not able to be obtained from the patient. History was obtained, to the extent possible, from review of the chart and collateral sources of information.    NEURO  Exam: awake, alert, oriented x 1  Meds: acetaminophen    Suspension. 650 milliGRAM(s) Enteral Tube every 6 hours PRN Mild Pain (1 - 3)  clonazePAM Tablet 2 milliGRAM(s) Oral at bedtime  melatonin 3 milliGRAM(s) Oral <User Schedule>  mirtazapine 45 milliGRAM(s) Oral <User Schedule>  QUEtiapine 50 milliGRAM(s) Oral <User Schedule>  sertraline 50 milliGRAM(s) Oral daily    [x] Adequacy of sedation and pain control has been assessed and adjusted      RESPIRATORY  RR: 21 (08-19-18 @ 12:00) (20 - 33)  SpO2: 98% (08-19-18 @ 12:18) (94% - 100%)  Wt(kg): --  Exam: unlabored, clear to auscultation bilaterally    Meds: ALBUTerol/ipratropium for Nebulization 3 milliLiter(s) Nebulizer every 6 hours  buDESOnide   0.5 milliGRAM(s) Respule 0.5 milliGRAM(s) Inhalation every 12 hours      CARDIOVASCULAR  HR: 100 (08-19-18 @ 12:18) (96 - 121)  BP: 130/61 (08-19-18 @ 12:00) (116/56 - 206/132)  BP(mean): 88 (08-19-18 @ 12:00) (81 - 161)  ABP: --  ABP(mean): --  Wt(kg): --  CVP(cm H2O): --  VBG - ( 18 Aug 2018 02:44 )  pH: 7.44  /  pCO2: 41    /  pO2: 44    / HCO3: 27    / Base Excess: 3.4   /  SaO2: 78     Lactate: 0.6        Exam: tachycardic, no m/r/g  Cardiac Rhythm:  Perfusion     [x]Adequate   [ ]Inadequate  Mentation   [x]Normal       [ ]Reduced  Extremities  [x]Warm         [ ]Cool  Volume Status [ ]Hypervolemic [x]Euvolemic [ ]Hypovolemic  Meds:       GI/NUTRITION  Exam: soft, NT/ND  Diet: NPO + TF (Pivot @ 30 cc/hr)  Meds: famotidine    Tablet 20 milliGRAM(s) Oral daily      GENITOURINARY  I&O's Detail    08-18 @ 07:01  -  08-19 @ 07:00  --------------------------------------------------------  IN:    Pivot: 330 mL    sodium chloride 0.9%.: 240 mL  Total IN: 570 mL    OUT:    Indwelling Catheter - Urethral: 1465 mL  Total OUT: 1465 mL    Total NET: -895 mL          08-19    141  |  103  |  38<H>  ----------------------------<  169<H>  4.0   |  25  |  0.98    Ca    8.1<L>      19 Aug 2018 02:35  Phos  3.1     08-19  Mg     2.3     08-19      [ ] Blackman catheter, indication: N/A  Meds: ascorbic acid 500 milliGRAM(s) Oral daily  iron sucrose IVPB 100 milliGRAM(s) IV Intermittent every 24 hours  multivitamin/minerals 1 Tablet(s) Oral daily  sodium chloride 0.9%. 1000 milliLiter(s) IV Continuous <Continuous>        HEMATOLOGIC  Meds: enoxaparin Injectable 40 milliGRAM(s) SubCutaneous daily    [x] VTE Prophylaxis                        8.8    10.4  )-----------( 650      ( 19 Aug 2018 02:35 )             28.8       Transfusion     [ ] PRBC   [ ] Platelets   [ ] FFP   [ ] Cryoprecipitate      INFECTIOUS DISEASES  T(C): 37.1 (08-19-18 @ 07:00), Max: 37.1 (08-19-18 @ 07:00)  Wt(kg): --  WBC Count: 10.4 K/uL (08-19 @ 02:35)    Recent Cultures:    Meds: vancomycin  IVPB 750 milliGRAM(s) IV Intermittent every 24 hours    ENDOCRINE  Capillary Blood Glucose    Meds: insulin lispro (HumaLOG) corrective regimen sliding scale   SubCutaneous every 4 hours  predniSONE   Tablet 10 milliGRAM(s) Oral every 24 hours        ACCESS DEVICES:  [x] Peripheral IV  [ ] Central Venous Line	[ ] R	[ ] L	[ ] IJ	        [ ] Fem	[ ] SC	Placed:   [ ] Arterial Line		        [ ] R	[ ] L	[ ] Fem	[ ] Rad	[ ] Ax	Placed:   [ ] PICC:					[ ] Mediport  [ ] Urinary Catheter, Date Placed:   [x] Necessity of urinary, arterial, and venous catheters discussed    OTHER MEDICATIONS:  chlorhexidine 4% Liquid 1 Application(s) Topical <User Schedule>  FIRST- Mouthwash  BLM 5 milliLiter(s) Swish and Spit daily  lidocaine   Patch 1 Patch Transdermal every 24 hours      CODE STATUS:     IMAGING:

## 2018-08-19 NOTE — PROGRESS NOTE ADULT - SUBJECTIVE AND OBJECTIVE BOX
CC: f/u for MRSA bacteremia and infected right hip prosthesis    Patient reports: she is confused(baseline), is breathing comfortably on RA    REVIEW OF SYSTEMS:  All other review of systems negative (Comprehensive ROS)    Antimicrobials Day #  :day 15 vanco-on held pending level    Other Medications Reviewed    T(F): 98.6 (08-19-18 @ 03:00), Max: 98.6 (08-19-18 @ 03:00)  HR: 96 (08-19-18 @ 05:39)  BP: 123/60 (08-19-18 @ 05:00)  RR: 20 (08-19-18 @ 05:00)  SpO2: 96% (08-19-18 @ 05:39)  Wt(kg): --    PHYSICAL EXAM:  General: alert, no acute distress  Eyes:  anicteric, no conjunctival injection, no discharge  Oropharynx: no lesions or injection 	  Neck: supple, without adenopathy  Lungs: clear to auscultation, diminished at bases  Heart: regular rate and rhythm; no murmur, rubs or gallops  Abdomen: soft, nondistended, nontender, without mass or organomegaly  Skin: less intense rash, faint erythroderma  Extremities: no clubbing, cyanosis, or edema  Neurologic: alert, confused, moves all extremities  Rt hip dressing dry    LAB RESULTS:                        8.8    10.4  )-----------( 650      ( 19 Aug 2018 02:35 )             28.8     08-19    141  |  103  |  38<H>  ----------------------------<  169<H>  4.0   |  25  |  0.98    Ca    8.1<L>      19 Aug 2018 02:35  Phos  3.1     08-19  Mg     2.3     08-19      vanco level is 19.9    MICROBIOLOGY:  RECENT CULTURES:      RADIOLOGY REVIEWED:    < from: Xray Chest 1 View- PORTABLE-Routine (08.17.18 @ 07:31) >  INTERPRETATION:  AP chest x-ray at 0704 hours on 17 August.    Clinical information: Atelectasis.    Comparison: Chest x-ray dated 16 August.    Findings: Study is limited due to patient positioning obscuring most of   the right hemithorax. Enteric tube and left-sided PICC line persist. Left   lung appears clear.    Impression: Limited study.    Clear left lung.    < end of copied text >

## 2018-08-19 NOTE — PROGRESS NOTE ADULT - ASSESSMENT
68F s/p R hip PJI explant and placement of cement spacer with distal femur ORIF  Pain control  NWB LLE  FFWB RLE  DVT ppx  Abx per ID  PT/OT/OOB  Dispo planning

## 2018-08-19 NOTE — PROGRESS NOTE ADULT - SUBJECTIVE AND OBJECTIVE BOX
Pt S/E at bedside, no acute events overnight, pain controlled. Pt currently sitting in chair, no complaints.    Vital Signs Last 24 Hrs  T(C): 37 (19 Aug 2018 03:00), Max: 37 (19 Aug 2018 03:00)  T(F): 98.6 (19 Aug 2018 03:00), Max: 98.6 (19 Aug 2018 03:00)  HR: 97 (19 Aug 2018 06:00) (96 - 121)  BP: 155/73 (19 Aug 2018 06:00) (105/65 - 206/132)  BP(mean): 105 (19 Aug 2018 06:00) (78 - 161)  RR: 21 (19 Aug 2018 06:00) (20 - 37)  SpO2: 100% (19 Aug 2018 06:00) (94% - 100%)    Gen: NAD, awake/alert    Right Lower Extremity:  Dressing clean dry intact  +EHL/FHL/TA/GS  SILT L3-S1  +DP/PT Pulses  Compartments soft  No calf TTP B/L

## 2018-08-19 NOTE — PROGRESS NOTE ADULT - ASSESSMENT
ASSESSMENT:  68 year old female presenting with septic shock from MRSA bacteremia secondary to infected right hemiarthroplasty hardware s/p right hip I&D, explantation of right hemiarthroplasty, placement of antibiotic spacer, and right femur ORIF. Course complicated by respiratory failure requiring reintubation, and BiPAP following extubation.    PLAN:  Neuro: acute post-op pain, anxiety, depression, dementia  - Monitor mental status.   - Home regimen of Klonopin, Zoloft, Seroquel, Remeron, and melatonin for agitation related to her dementia.  - Pain control with Tylenol and Lidoderm patch.    Resp: acute respiratory distress, asthma, resolved PNA s/p course of meropenem  - Monitor pulse oximeter and ABGs.  - BiPAP as needed for respiratory distress  - Continue Lasix daily for pleural effusions, reassess daily need for IV Lasix.   - Out of bed to chair, incentive spirometry, and pulmonary toileting to prevent atelectasis.  - Home prednisone 10 mg daily, Pulmicort, and Duoneb for asthma.  - Mucomyst for thick secretions.    CV: no acute issues  - Monitor vital signs.    GI: dysphagia  - NPO with Pivot at goal of 30 mL/hr  - Famotidine for GERD.  - Bowel regimen with Colace & senna.    Renal: CKD stage 2  - Monitor I&Os.  - Monitor electrolytes and replete as necessary.  - KVO fluids.  - Lasix 20mg PO daily, reassess need for IV Lasix daily.     Heme: no acute issues  - Lovenox for VTE prophylaxis.    ID: MRSA bacteremia from infected right hip, Candiduria, possible PNA, resolved  - Monitor WBC, temperature, and procalcitonin.  - Vancomycin for MRSA bacteremia - check trough daily and resume when trough is therapeutic   - Appreciate ID recommendations.  - Reculture as clinically indicated.    Endo: hyperglycemia  - Monitor fingersticks q6hrs for hypoglycemia.    Disposition:  Full code. Will remain in SICU for respiratory monitoring.    GWYN Parkinson, PGY-2  [id number]

## 2018-08-19 NOTE — PROGRESS NOTE ADULT - SUBJECTIVE AND OBJECTIVE BOX
---___---___---___---___---___---___ ---___---___---___---___---___---___---___---___---___---                    <<<  M E D I C A L   A T T E N D I N G    F O L L O W    U P   N O T E  >>>  in bed no distress      ---___---___---___---___---___---      <<<  MEDICATIONS:  >>>    MEDICATIONS  (STANDING):  ALBUTerol/ipratropium for Nebulization 3 milliLiter(s) Nebulizer every 6 hours  ascorbic acid 500 milliGRAM(s) Oral daily  buDESOnide   0.5 milliGRAM(s) Respule 0.5 milliGRAM(s) Inhalation every 12 hours  chlorhexidine 4% Liquid 1 Application(s) Topical <User Schedule>  clonazePAM Tablet 2 milliGRAM(s) Oral at bedtime  enoxaparin Injectable 40 milliGRAM(s) SubCutaneous daily  famotidine    Tablet 20 milliGRAM(s) Oral daily  FIRST- Mouthwash  BLM 5 milliLiter(s) Swish and Spit daily  insulin lispro (HumaLOG) corrective regimen sliding scale   SubCutaneous every 4 hours  iron sucrose IVPB 100 milliGRAM(s) IV Intermittent every 24 hours  lidocaine   Patch 1 Patch Transdermal every 24 hours  melatonin 3 milliGRAM(s) Oral <User Schedule>  mirtazapine 45 milliGRAM(s) Oral <User Schedule>  multivitamin/minerals 1 Tablet(s) Oral daily  predniSONE   Tablet 10 milliGRAM(s) Oral every 24 hours  QUEtiapine 50 milliGRAM(s) Oral <User Schedule>  sertraline 50 milliGRAM(s) Oral daily  sodium chloride 0.9%. 1000 milliLiter(s) (10 mL/Hr) IV Continuous <Continuous>  vancomycin  IVPB 750 milliGRAM(s) IV Intermittent every 24 hours      MEDICATIONS  (PRN):  acetaminophen    Suspension. 650 milliGRAM(s) Enteral Tube every 6 hours PRN Mild Pain (1 - 3)       ---___---___---___---___---___---     <<<REVIEW OF SYSTEM: >>>    GEN: no fever, no chills, no pain  RESP: no SOB, no cough, no sputum  CVS: no chest pain, no palpitations, no edema  GI: no abdominal pain, no nausea, no vomiting, no constipation, no diarrhea  : no dysurea, no frequency  NEURO: no headache, no dizziness  PSYCH: no depression, not anxious  Derm : no itching, no rash     ---___---___---___---___---___---          <<<  VITAL SIGNS: >>>    T(F): 98.8 (08-19-18 @ 07:00), Max: 98.8 (08-19-18 @ 07:00)  HR: 100 (08-19-18 @ 12:18) (96 - 121)  BP: 130/61 (08-19-18 @ 12:00) (116/56 - 206/132)  RR: 21 (08-19-18 @ 12:00) (20 - 33)  SpO2: 98% (08-19-18 @ 12:18) (94% - 100%)  Wt(kg): --  CAPILLARY BLOOD GLUCOSE      POCT Blood Glucose.: 102 mg/dL (19 Aug 2018 10:41)    I&O's Summary    18 Aug 2018 07:01  -  19 Aug 2018 07:00  --------------------------------------------------------  IN: 570 mL / OUT: 1465 mL / NET: -895 mL         ---___---___---___---___---___---                       PHYSICAL EXAM:    GEN: A&O X 2 , NAD , comfortable  HEENT: NCAT, PERRL, MMM, no scleral icterus, hearing intact  NECK: Supple, No JVD  CVS: S1S2 , regular , No M/R/G appreciated  PULM: CTA B/L,  no W/R/R appreciated  ABD.: soft. non tender, non distended,  bowel sounds present  Extrem: intact pulses , no edema noted  Derm: No rash or ecchymosis noted  PSYCH: normal mood, no depression,  anxious     ---___---___---___---___---___---     <<<  LAB AND IMAGING: >>>                          8.8    10.4  )-----------( 650      ( 19 Aug 2018 02:35 )             28.8               08-19    141  |  103  |  38<H>  ----------------------------<  169<H>  4.0   |  25  |  0.98    Ca    8.1<L>      19 Aug 2018 02:35  Phos  3.1     08-19  Mg     2.3     08-19                                 [All pertinent / recent available Imaging reports and other labs reviewed]     ---___---___---___---___---___---___ ---___---___---___---___---           <<<  A S S E S S M E N T   A N D   P L A N :  >>>          -GI/DVT Prophylaxis.    --------------------------------------------       >>______________________<<      Deniz Bolden .         phone   [phone]

## 2018-08-19 NOTE — PROGRESS NOTE ADULT - ATTENDING COMMENTS
Off BiPAP saturating well  CXR unchanged no large effusion or infiltrate  Psy meds  DC Lasix, not in fluid overload  Tube feed to goal, will likely PEG  Restart vanco at 750 for vanco trough of 19, off merem, afebrile, recent cultures neg  PT

## 2018-08-20 LAB
ANION GAP SERPL CALC-SCNC: 12 MMOL/L — SIGNIFICANT CHANGE UP (ref 5–17)
BUN SERPL-MCNC: 38 MG/DL — HIGH (ref 7–23)
CALCIUM SERPL-MCNC: 7.9 MG/DL — LOW (ref 8.4–10.5)
CHLORIDE SERPL-SCNC: 105 MMOL/L — SIGNIFICANT CHANGE UP (ref 96–108)
CO2 SERPL-SCNC: 26 MMOL/L — SIGNIFICANT CHANGE UP (ref 22–31)
CREAT SERPL-MCNC: 1.01 MG/DL — SIGNIFICANT CHANGE UP (ref 0.5–1.3)
GLUCOSE SERPL-MCNC: 193 MG/DL — HIGH (ref 70–99)
HCT VFR BLD CALC: 27.6 % — LOW (ref 34.5–45)
HGB BLD-MCNC: 8.9 G/DL — LOW (ref 11.5–15.5)
MAGNESIUM SERPL-MCNC: 2 MG/DL — SIGNIFICANT CHANGE UP (ref 1.6–2.6)
MCHC RBC-ENTMCNC: 29.9 PG — SIGNIFICANT CHANGE UP (ref 27–34)
MCHC RBC-ENTMCNC: 32.3 GM/DL — SIGNIFICANT CHANGE UP (ref 32–36)
MCV RBC AUTO: 92.7 FL — SIGNIFICANT CHANGE UP (ref 80–100)
PHOSPHATE SERPL-MCNC: 4 MG/DL — SIGNIFICANT CHANGE UP (ref 2.5–4.5)
PLATELET # BLD AUTO: 589 K/UL — HIGH (ref 150–400)
POTASSIUM SERPL-MCNC: 4 MMOL/L — SIGNIFICANT CHANGE UP (ref 3.5–5.3)
POTASSIUM SERPL-SCNC: 4 MMOL/L — SIGNIFICANT CHANGE UP (ref 3.5–5.3)
RBC # BLD: 2.97 M/UL — LOW (ref 3.8–5.2)
RBC # FLD: 14.4 % — SIGNIFICANT CHANGE UP (ref 10.3–14.5)
SODIUM SERPL-SCNC: 143 MMOL/L — SIGNIFICANT CHANGE UP (ref 135–145)
WBC # BLD: 10.5 K/UL — SIGNIFICANT CHANGE UP (ref 3.8–10.5)
WBC # FLD AUTO: 10.5 K/UL — SIGNIFICANT CHANGE UP (ref 3.8–10.5)

## 2018-08-20 PROCEDURE — 99233 SBSQ HOSP IP/OBS HIGH 50: CPT

## 2018-08-20 PROCEDURE — 71045 X-RAY EXAM CHEST 1 VIEW: CPT | Mod: 26

## 2018-08-20 RX ADMIN — LIDOCAINE 1 PATCH: 4 CREAM TOPICAL at 22:43

## 2018-08-20 RX ADMIN — Medication 3 MILLILITER(S): at 17:28

## 2018-08-20 RX ADMIN — LIDOCAINE 1 PATCH: 4 CREAM TOPICAL at 01:00

## 2018-08-20 RX ADMIN — Medication 250 MILLIGRAM(S): at 10:26

## 2018-08-20 RX ADMIN — FAMOTIDINE 20 MILLIGRAM(S): 10 INJECTION INTRAVENOUS at 11:31

## 2018-08-20 RX ADMIN — MIRTAZAPINE 45 MILLIGRAM(S): 45 TABLET, ORALLY DISINTEGRATING ORAL at 22:43

## 2018-08-20 RX ADMIN — Medication 0.5 MILLIGRAM(S): at 17:28

## 2018-08-20 RX ADMIN — IRON SUCROSE 210 MILLIGRAM(S): 20 INJECTION, SOLUTION INTRAVENOUS at 16:00

## 2018-08-20 RX ADMIN — Medication 1 TABLET(S): at 16:00

## 2018-08-20 RX ADMIN — CHLORHEXIDINE GLUCONATE 1 APPLICATION(S): 213 SOLUTION TOPICAL at 06:48

## 2018-08-20 RX ADMIN — Medication 10 MILLIGRAM(S): at 10:26

## 2018-08-20 RX ADMIN — Medication 0.5 MILLIGRAM(S): at 06:33

## 2018-08-20 RX ADMIN — Medication 650 MILLIGRAM(S): at 16:00

## 2018-08-20 RX ADMIN — Medication 650 MILLIGRAM(S): at 15:27

## 2018-08-20 RX ADMIN — Medication 2: at 17:23

## 2018-08-20 RX ADMIN — LIDOCAINE 1 PATCH: 4 CREAM TOPICAL at 11:41

## 2018-08-20 RX ADMIN — Medication 500 MILLIGRAM(S): at 11:29

## 2018-08-20 RX ADMIN — Medication 3 MILLILITER(S): at 12:58

## 2018-08-20 RX ADMIN — Medication 1: at 02:00

## 2018-08-20 RX ADMIN — Medication 2 MILLIGRAM(S): at 22:42

## 2018-08-20 RX ADMIN — ENOXAPARIN SODIUM 40 MILLIGRAM(S): 100 INJECTION SUBCUTANEOUS at 11:30

## 2018-08-20 RX ADMIN — Medication 3 MILLILITER(S): at 06:31

## 2018-08-20 RX ADMIN — QUETIAPINE FUMARATE 50 MILLIGRAM(S): 200 TABLET, FILM COATED ORAL at 22:43

## 2018-08-20 RX ADMIN — DIPHENHYDRAMINE HYDROCHLORIDE AND LIDOCAINE HYDROCHLORIDE AND ALUMINUM HYDROXIDE AND MAGNESIUM HYDRO 5 MILLILITER(S): KIT at 11:30

## 2018-08-20 RX ADMIN — Medication 3 MILLIGRAM(S): at 22:43

## 2018-08-20 RX ADMIN — SERTRALINE 50 MILLIGRAM(S): 25 TABLET, FILM COATED ORAL at 11:29

## 2018-08-20 NOTE — PHYSICAL THERAPY INITIAL EVALUATION ADULT - DISCHARGE DISPOSITION, PT EVAL
to be determined following completion of functional eval for transfers and ambulation
rehabilitation facility/subacute rehab

## 2018-08-20 NOTE — PROGRESS NOTE ADULT - ATTENDING COMMENTS
Patient had no acute events overnight.   Oxygenation is improved.   Patient with dysphagia- will have a feast test with ENT. Family does not want   CXR unchanged no large effusion or infiltrate  Continue current neuro  medications  Tube feed to goal, will likely PEG  Continue vanco at 750mg for a 42 day course  Mobilization as tolerated- oob to chair

## 2018-08-20 NOTE — SWALLOW BEDSIDE ASSESSMENT ADULT - ORAL PREPARATORY PHASE
reduced orientation to feeding requiring verbal cues, martin-oral stimulation/Reduced oral grading
Within functional limits

## 2018-08-20 NOTE — SWALLOW BEDSIDE ASSESSMENT ADULT - ORAL PHASE
+ oral holding, minimal manipulation of material until given martin-oral and oral cavity stimulation/Delayed oral transit time
Delayed oral transit time

## 2018-08-20 NOTE — PHYSICAL THERAPY INITIAL EVALUATION ADULT - BALANCE TRAINING, PT EVAL
GOAL: Pt will demonstrate improved static/dynamic standing balance to fair +, in order to improve stability, decrease fall risk and increase independence with transfers within 4 weeks.
GOAL: Pt will demonstrate improved static/dynamic sitting balance to fair in order to improve stability, decrease fall risk and increase independence with ADLs within 4 weeks.

## 2018-08-20 NOTE — PHYSICAL THERAPY INITIAL EVALUATION ADULT - MANUAL MUSCLE TESTING RESULTS, REHAB EVAL
grossly assessed due to/BUE grossly at least 3/5 throughout, BLE grossly 2+/5
BUE grossly at least 3/5 throughout, RLE grossly 3-/5, LLE not formally assessed; limited due to pt not consistently following commands/grossly assessed due to

## 2018-08-20 NOTE — PHYSICAL THERAPY INITIAL EVALUATION ADULT - IMPAIRMENTS CONTRIBUTING IMPAIRED BED MOBILITY, REHAB EVAL
decreased ROM/decreased endurance/impaired balance/decreased strength/cognition/impaired postural control

## 2018-08-20 NOTE — PHYSICAL THERAPY INITIAL EVALUATION ADULT - STRENGTHENING, PT EVAL
GOAL: Pt will improve bilateral LE strength to 3+/5 for increased limb stability, and in prep for gait training in 4 weeks.

## 2018-08-20 NOTE — PHYSICAL THERAPY INITIAL EVALUATION ADULT - BED MOBILITY TRAINING, PT EVAL
GOAL: Pt will perform bed mobility w/contact guard assist, in 4 weeks.
GOAL: Pt will perform bed mobility w/minimum assist, in 4 weeks.

## 2018-08-20 NOTE — PHYSICAL THERAPY INITIAL EVALUATION ADULT - PASSIVE RANGE OF MOTION EXAMINATION, REHAB EVAL
bilateral upper extremity Passive ROM was WFL (within functional limits)/bilateral lower extremity Passive ROM was WFL (within functional limits)
L knee & ankle WFL

## 2018-08-20 NOTE — SWALLOW BEDSIDE ASSESSMENT ADULT - SWALLOW EVAL: DIAGNOSIS
Pt presents with a suspected pharyngeal dysphagia characterized by overt s/s of laryngeal penetration/aspiration post swallow of ice chips evidenced by repeat swallows, increased RR to 30's and throat clearing. Pt with reduced insight demonstrated by inappropriate phonation with oral intake increasing risk for aspiration.  Pt also noted to be hypophonic with hoarse/breathy vocal quality.

## 2018-08-20 NOTE — PROGRESS NOTE ADULT - SUBJECTIVE AND OBJECTIVE BOX
CC: f/u for MRSA bacteremia and infected Rt hip prosthesis    Patient reports: she is very comfortable on RA, fed with an NG tube    REVIEW OF SYSTEMS:  All other review of systems negative (Comprehensive ROS): NPO, generalized rash, no change    Antimicrobials Day #  :day 16  vancomycin  IVPB 750 milliGRAM(s) IV Intermittent every 24 hours    Other Medications Reviewed    T(F): 99.3 (08-20-18 @ 11:00), Max: 99.7 (08-20-18 @ 07:00)  HR: 103 (08-20-18 @ 13:02)  BP: 158/100 (08-20-18 @ 12:00)  RR: 29 (08-20-18 @ 12:00)  SpO2: 100% (08-20-18 @ 13:02)  Wt(kg): --    PHYSICAL EXAM:  General: alert, no acute distress  Eyes:  anicteric, no conjunctival injection, no discharge  Oropharynx: no lesions or injection 	  Neck: supple, without adenopathy  Lungs: clear to auscultation, diminished at bases  Heart: regular rate and rhythm; no murmur, rubs or gallops  Abdomen: soft, nondistended, nontender, without mass or organomegaly  Skin: stable generalized rash  Extremities: no clubbing, cyanosis, or edema  Neurologic: alert, confused, moves all extremities    LAB RESULTS:                        8.9    10.5  )-----------( 589      ( 20 Aug 2018 04:15 )             27.6     08-20    143  |  105  |  38<H>  ----------------------------<  193<H>  4.0   |  26  |  1.01    Ca    7.9<L>      20 Aug 2018 04:15  Phos  4.0     08-20  Mg     2.0     08-20    Vanco level 8/19 = 19.9      MICROBIOLOGY:  RECENT CULTURES:      RADIOLOGY REVIEWED:  < from: Xray Chest 1 View- PORTABLE-Urgent (08.19.18 @ 16:44) >  INTERPRETATION:    DATE OF STUDY: 8/19/18 at 4:43PM.    PRIOR: Earlier 8/19/18 chest.    CLINICAL INDICATION: Assess NG tube.    TECHNIQUE: portable chest.    FINDINGS:   NG tube appropriately situated in the stomach.  The cardiomediastinal silhouette is stable.   Small left pleural effusion persists - with persistent left retrocardiac   haziness.  The visualized right lung is clear.  No pneumothorax.    IMPRESSION:   NG tube appropriately situated in the stomach.  Unchanged bilateral pleural-parenchymal pattern.    < end of copied text >

## 2018-08-20 NOTE — PROGRESS NOTE ADULT - ASSESSMENT
ASSESSMENT:  MYRIAM HEATH is a 68y Female with history of     PLAN:    NEURO:    RESPIRATORY:     CARDIOVASCULAR:    GI/NUTRITION:    GENITOURINARY/RENAL:    HEMATOLOGIC:    INFECTIOUS DISEASE:    ENDOCRINE:    Sam Brock, PGY2 ASSESSMENT:  MYRIAM HEATH is a 68y Female with history of asthma, CVA, pulmonary HTN presents with septic shock from MRSA bacteremia secondary to infected right hemiarthroplasty hardware s/p right hip I&D, explantation of right hemiarthroplasty, placement of antibiotic spacer, and right femur ORIF. Course complicated by respiratory failure requiring reintubation, and BiPAP following extubation.      PLAN:    NEURO: acute post-op pain, anxiety, depression, dementia  - Monitor mental status.   - Home regimen of Klonopin, Zoloft, Seroquel, Remeron, and melatonin for agitation related to her dementia.  - Pain control with Tylenol and Lidoderm patch.    RESPIRATORY: acute respiratory distress, asthma, resolved PNA s/p course of meropenem  - Monitor pulse oximeter and ABGs.  - BiPAP as needed for respiratory distress   - Out of bed to chair, incentive spirometry, and pulmonary toileting to prevent atelectasis.  - Home prednisone 10 mg daily, Pulmicort, and Duoneb for asthma.  - Mucomyst for thick secretions.      CARDIOVASCULAR: no acute issues  - Monitor vital signs.    GI/NUTRITION: dysphagia  - NPO with Pivot at goal of 30 mL/hr  - Famotidine for GERD.  - Bowel regimen with Colace & senna.      GENITOURINARY/RENAL: CKD stage 2  - Monitor I&Os.  - Monitor electrolytes and replete as necessary.  - KVO fluids.      HEMATOLOGIC: no acute issues  - Lovenox for VTE prophylaxis.    INFECTIOUS DISEASE: MRSA bacteremia from infected right hip, Candiduria, possible PNA, resolved  - Monitor WBC, temperature, and procalcitonin.  - Vancomycin for MRSA bacteremia - check trough daily and resume when trough is therapeutic   - Appreciate ID recommendations.  - Reculture as clinically indicated.      ENDOCRINE: hyperglycemia  - Monitor fingersticks q6hrs for hypoglycemia.  -ISS q4      Kelsy Coughlin, PGY1

## 2018-08-20 NOTE — PROGRESS NOTE ADULT - ASSESSMENT
69 yo female with dementia, history of UC, and s/p RT Hip hemiarthroplasty 6/22/18  postsurgical hematoma with secondary infection  Admitted with low grade fever, rigors, increased confusion, found to have leukocytosis, 20% bands, ICU, pressor use   Bld Cxs 7/26-8/1 all  positive all MRSA  s/p R hip I&D, lavage, poly exchange- pus encountered, all specimens S aureus.Initial surgery 7/27, repeat debridement 8/4, now with spacer  Afebrile, Creat stable  Dapto and ceftaroline used for a short time,concerns of rash led to dapto and rifampin, fever led to switch to vanco as sole MRSA agent  GAYATHRI: no evidence of valvular vegetation  s/p return to OR, removal of hardware/spacer,  perhaps effective source control on 8/4  leukocytosis has resolved, etiology of recent  fevers not clear, have resolved  Candida in urine is likely a colonizer, no pressing need to treat it.  Meropenem use was empiric for possible GNR infection, although CXR has been clear, scant ET secretions, and urine has grown yeast on 8/9 and less than 10,000 on 8/12  Meropenem course completed 8/16  New rash of unclear etiology, rashes to vancomycin not common, ? delayed reaction to prior meropenem, it appears stable at this point  Plan:  1.continue vanco, day 16 of planned 42 day course  2.Monitor off fluconazole and meropenem  3.vanco on hold due to elevated level,,would restart  at a deduced dose when level less than 15  4.Supportive respiratory care per ICU ,tenuous status  5.vanco trough in 3 days   4.Disposition per SICU service

## 2018-08-20 NOTE — PHYSICAL THERAPY INITIAL EVALUATION ADULT - PERTINENT HX OF CURRENT PROBLEM, REHAB EVAL
68y F with PMH of asthma on steroids, CVA (no residual deficits), pulmonary HTN, HLD, GERD, ulcerative colitis, fatty pancreas, impaired glucose tolerance, anxiety, and depression who presented on 7/26/2018 with AMS, rigors, and urinary frequency. Of note, recent hospitalization for a right femoral neck fracture secondary to osteoporosis s/p hemiarthroplasty on 6/22/2018. Pt has been progressively more agitated, confused, and weak, also with c/o new onset low back pain and left hip pain.

## 2018-08-20 NOTE — PROGRESS NOTE ADULT - SUBJECTIVE AND OBJECTIVE BOX
Patient seen and examined. Pain controlled.    Physical exam  VS: see EMR  Gen: NAD  Right LE: Dressing clean, dry, and intact. +EHL/FHL/TA/GSC. SILT L3-S1. +capillary refill brisk. Compartments soft and nontender.      68F s/p R hip PJI explant and placement of cement spacer w/ ORIF distal femur    Pain control  DVT ppx lovenox  Abx per ID  NWB LLE  FFWB RLE  PT/OT  Ortho stable for discharge

## 2018-08-20 NOTE — PROGRESS NOTE ADULT - SUBJECTIVE AND OBJECTIVE BOX
---___---___---___---___---___---___ ---___---___---___---___---___---___---___---___---___---                    <<<  M E D I C A L   A T T E N D I N G    F O L L O W    U P   N O T E  >>>    patient awake laying in bed  no distress     ---___---___---___---___---___---      <<<  MEDICATIONS:  >>>    MEDICATIONS  (STANDING):  ALBUTerol/ipratropium for Nebulization 3 milliLiter(s) Nebulizer every 6 hours  ascorbic acid 500 milliGRAM(s) Oral daily  buDESOnide   0.5 milliGRAM(s) Respule 0.5 milliGRAM(s) Inhalation every 12 hours  chlorhexidine 4% Liquid 1 Application(s) Topical <User Schedule>  clonazePAM Tablet 2 milliGRAM(s) Oral at bedtime  enoxaparin Injectable 40 milliGRAM(s) SubCutaneous daily  famotidine    Tablet 20 milliGRAM(s) Oral daily  FIRST- Mouthwash  BLM 5 milliLiter(s) Swish and Spit daily  insulin lispro (HumaLOG) corrective regimen sliding scale   SubCutaneous every 4 hours  iron sucrose IVPB 100 milliGRAM(s) IV Intermittent every 24 hours  lidocaine   Patch 1 Patch Transdermal every 24 hours  melatonin 3 milliGRAM(s) Oral <User Schedule>  mirtazapine 45 milliGRAM(s) Oral <User Schedule>  multivitamin/minerals 1 Tablet(s) Oral daily  predniSONE   Tablet 10 milliGRAM(s) Oral every 24 hours  QUEtiapine 50 milliGRAM(s) Oral <User Schedule>  sertraline 50 milliGRAM(s) Oral daily  sodium chloride 0.9%. 1000 milliLiter(s) (10 mL/Hr) IV Continuous <Continuous>  vancomycin  IVPB 750 milliGRAM(s) IV Intermittent every 24 hours      MEDICATIONS  (PRN):  acetaminophen    Suspension. 650 milliGRAM(s) Enteral Tube every 6 hours PRN Mild Pain (1 - 3)       ---___---___---___---___---___---     <<<REVIEW OF SYSTEM: >>>    GEN: no fever, no chills, no pain  RESP: no SOB, no cough, no sputum  CVS: no chest pain, no palpitations, no edema  GI: no abdominal pain, no nausea, no vomiting, no constipation, no diarrhea  : no dysurea, no frequency  NEURO: no headache, no dizziness  PSYCH: no depression, not anxious  Derm : no itching, no rash     ---___---___---___---___---___---          <<<  VITAL SIGNS: >>>    T(F): 99 (08-20-18 @ 15:00), Max: 99.7 (08-20-18 @ 07:00)  HR: 109 (08-20-18 @ 17:29) (96 - 111)  BP: 157/114 (08-20-18 @ 16:00) (110/57 - 158/100)  RR: 25 (08-20-18 @ 16:00) (19 - 35)  SpO2: 100% (08-20-18 @ 17:29) (90% - 100%)  Wt(kg): --  CAPILLARY BLOOD GLUCOSE      POCT Blood Glucose.: 215 mg/dL (20 Aug 2018 17:16)    I&O's Summary    19 Aug 2018 07:01  -  20 Aug 2018 07:00  --------------------------------------------------------  IN: 690 mL / OUT: 2010 mL / NET: -1320 mL    20 Aug 2018 07:01  -  20 Aug 2018 17:38  --------------------------------------------------------  IN: 600 mL / OUT: 235 mL / NET: 365 mL         ---___---___---___---___---___---                       PHYSICAL EXAM:    GEN: A&O X 3 , NAD , comfortable  HEENT: NCAT, PERRL, MMM, no scleral icterus, hearing intact  NECK: Supple, No JVD  CVS: S1S2 , regular , No M/R/G appreciated  PULM: CTA B/L,  no W/R/R appreciated  ABD.: soft. non tender, non distended,  bowel sounds present  Extrem: intact pulses , no edema noted  Derm: No rash or ecchymosis noted  PSYCH: normal mood, no depression, not anxious     ---___---___---___---___---___---     <<<  LAB AND IMAGING: >>>                          8.9    10.5  )-----------( 589      ( 20 Aug 2018 04:15 )             27.6               08-20    143  |  105  |  38<H>  ----------------------------<  193<H>  4.0   |  26  |  1.01    Ca    7.9<L>      20 Aug 2018 04:15  Phos  4.0     08-20  Mg     2.0     08-20                                 [All pertinent / recent available Imaging reports and other labs reviewed]     ---___---___---___---___---___---___ ---___---___---___---___---           <<<  A S S E S S M E N T   A N D   P L A N :  >>>          -GI/DVT Prophylaxis.    --------------------------------------------  Case discussed with mr roque and daughter at bed side   Education given on     >>______________________<<      Deniz Bolden .         phone   [phone]

## 2018-08-20 NOTE — PHYSICAL THERAPY INITIAL EVALUATION ADULT - PRECAUTIONS/LIMITATIONS, REHAB EVAL
fall precautions/per Ortho TLSO; CT scan revealed a 17 cm collection adjacent to the right hip prosthesis, an acute L2 compression fracture, and new left acetabular fracture. Blodd cultures positive for MRSA. RRT called 7/27/2018 due to pt more altered, rigorous, tachypneic, febrile to 101 F, and hypotensive. OR 7/27/2018 for right hip I&D and exchange of the femoral head. OR on 8/4/2018 for further right hip I&D, explantation of right hemiarthroplasty, placement of antibiotic spacer, and right femur ORIF.Pt weaned off vasopressor support on 8/5/2018 and extubated on 8/6/2018 but required reintubation (2nd time) for tachypnea with accessory muscle use on 8/11/2018. Pt extubated again 8/15/18. fall precautions/per Ortho TLSO; CT scan revealed a 17 cm collection adjacent to the right hip prosthesis, an acute L2 compression fracture, and new left acetabular fracture. Blood cultures positive for MRSA. RRT called 7/27/2018 due to pt more altered, rigorous, tachypneic, febrile to 101 F, and hypotensive. OR 7/27/2018 for right hip I&D and exchange of the femoral head. OR on 8/4/2018 for further right hip I&D, explantation of right hemiarthroplasty, placement of antibiotic spacer, and right femur ORIF.Pt weaned off vasopressor support on 8/5/2018 and extubated on 8/6/2018 but required reintubation (2nd time) for tachypnea with accessory muscle use on 8/11/2018. Pt extubated again 8/15/18.

## 2018-08-20 NOTE — SWALLOW BEDSIDE ASSESSMENT ADULT - SLP GENERAL OBSERVATIONS
Pt found in bed awake and alert. HOB elevated. NAD. Pt episodically following simple commands. Wants and needs to be anticipated as pt does not consistently independently express.

## 2018-08-20 NOTE — PROGRESS NOTE ADULT - SUBJECTIVE AND OBJECTIVE BOX
HISTORY  68y Female with PMH of asthma on steroids, CVA (no residual deficits), pulmonary HTN, HLD, GERD, ulcerative colitis, fatty pancreas, impaired glucose tolerance, anxiety, and depression who presented on 7/26/2018 with altered mental status, rigors, and urinary frequency. Of note, patient was recently hospitalized for a right femoral neck fracture secondary to osteoporosis s/p hemiarthroplasty on 6/22/2018. She had been taking Plavix for her history of CVA and Lovenox for VTE prophylaxis but she developed a hematoma at her surgical site so the Lovenox was stopped. Since then, patient has been progressively more agitated, confused, and weak. Additionally, patient has been complaining of new onset low back pain and left hip pain. In the ED, patient was hemodynamically stable but noted to be hypoglycemic. She was also started on meropenem & vancomycin for concern of sepsis. CT scan revealed a 17 cm collection adjacent to the right hip prosthesis, an acute L2 compression fracture, and new left acetabular fracture. Patient was admitted to medicine but on 7/27/2018, patient was noted to be more altered, rigorous, tachypneic, febrile to 101 F, and hypotensive w/ SBP in the 60s so an RRT was called. She was more fluid and started on a norepinephrine gtt. Patient subsequently admitted to SICU for hemodynamic monitoring. Blood cultures positive for MRSA. Decision was made to take the patient to the OR on 7/27/2018 for right hip I&D and exchange of the femoral head. She returned to the SICU intubated on vasopressor support. She was extubated on 7/30/2018 but was reintubated on 8/2/2018. Additionally, patient was persistently on vasopressor support and bacteremic with MRSA. Patient was subsequently taken back to the OR on 8/4/2018 for further right hip I&D, explantation of right hemiarthroplasty, placement of antibiotic spacer, and right femur ORIF. Patient was subsequently weaned off vasopressor support on 8/5/2018 and extubated on 8/6/2018 but required reintubation (2nd time) for tachypnea with accessory muscle use on 8/11/2018. Pt subsequently extubated 8/15/18.      24 HOUR EVENTS: no acute events over night     SUBJECTIVE/ROS:  [ ] A ten-point review of systems was otherwise negative except as noted.  [ ] Due to altered mental status/intubation, subjective information were not able to be obtained from the patient. History was obtained, to the extent possible, from review of the chart and collateral sources of information.      NEURO  RASS:     GCS:     CAM ICU:  Exam: awake, alert, oriented  Meds: acetaminophen    Suspension. 650 milliGRAM(s) Enteral Tube every 6 hours PRN Mild Pain (1 - 3)  clonazePAM Tablet 2 milliGRAM(s) Oral at bedtime  melatonin 3 milliGRAM(s) Oral <User Schedule>  mirtazapine 45 milliGRAM(s) Oral <User Schedule>  QUEtiapine 50 milliGRAM(s) Oral <User Schedule>  sertraline 50 milliGRAM(s) Oral daily    [x] Adequacy of sedation and pain control has been assessed and adjusted      RESPIRATORY  RR: 33 (08-19-18 @ 23:00) (18 - 33)  SpO2: 99% (08-19-18 @ 23:18) (90% - 100%)  Wt(kg): --  Exam: unlabored, clear to auscultation bilaterally  Mechanical Ventilation:     [N/A] Extubation Readiness Assessed  Meds: ALBUTerol/ipratropium for Nebulization 3 milliLiter(s) Nebulizer every 6 hours  buDESOnide   0.5 milliGRAM(s) Respule 0.5 milliGRAM(s) Inhalation every 12 hours        CARDIOVASCULAR  HR: 104 (08-19-18 @ 23:18) (96 - 121)  BP: 154/76 (08-19-18 @ 23:00) (110/57 - 191/140)  BP(mean): 104 (08-19-18 @ 23:00) (80 - 145)  ABP: --  ABP(mean): --  Wt(kg): --  CVP(cm H2O): --      Exam: regular rate and rhythm  Cardiac Rhythm: sinus  Perfusion     [x]Adequate   [ ]Inadequate  Mentation   [x]Normal       [ ]Reduced  Extremities  [x]Warm         [ ]Cool  Volume Status [ ]Hypervolemic [x]Euvolemic [ ]Hypovolemic  Meds:       GI/NUTRITION  Exam: soft, nontender, nondistended, incision C/D/I  Diet:  Meds: famotidine    Tablet 20 milliGRAM(s) Oral daily      GENITOURINARY  I&O's Detail    08-18 @ 07:01  -  08-19 @ 07:00  --------------------------------------------------------  IN:    Pivot: 330 mL    sodium chloride 0.9%.: 240 mL  Total IN: 570 mL    OUT:    Indwelling Catheter - Urethral: 1465 mL  Total OUT: 1465 mL    Total NET: -895 mL      08-19 @ 07:01  -  08-20 @ 04:20  --------------------------------------------------------  IN:    Pivot: 330 mL    sodium chloride 0.9%.: 200 mL  Total IN: 530 mL    OUT:    Indwelling Catheter - Urethral: 1920 mL  Total OUT: 1920 mL    Total NET: -1390 mL          08-19    141  |  103  |  38<H>  ----------------------------<  169<H>  4.0   |  25  |  0.98    Ca    8.1<L>      19 Aug 2018 02:35  Phos  3.1     08-19  Mg     2.3     08-19      [ ] Blackman catheter, indication: N/A  Meds: ascorbic acid 500 milliGRAM(s) Oral daily  iron sucrose IVPB 100 milliGRAM(s) IV Intermittent every 24 hours  multivitamin/minerals 1 Tablet(s) Oral daily  sodium chloride 0.9%. 1000 milliLiter(s) IV Continuous <Continuous>        HEMATOLOGIC  Meds: enoxaparin Injectable 40 milliGRAM(s) SubCutaneous daily    [x] VTE Prophylaxis                        8.8    10.4  )-----------( 650      ( 19 Aug 2018 02:35 )             28.8       Transfusion     [ ] PRBC   [ ] Platelets   [ ] FFP   [ ] Cryoprecipitate      INFECTIOUS DISEASES    RECENT CULTURES:    Meds: vancomycin  IVPB 750 milliGRAM(s) IV Intermittent every 24 hours        ENDOCRINE  CAPILLARY BLOOD GLUCOSE      POCT Blood Glucose.: 74 mg/dL (19 Aug 2018 17:35)  POCT Blood Glucose.: 164 mg/dL (19 Aug 2018 15:02)  POCT Blood Glucose.: 164 mg/dL (19 Aug 2018 14:59)  POCT Blood Glucose.: >600 mg/dL (19 Aug 2018 14:57)  POCT Blood Glucose.: 102 mg/dL (19 Aug 2018 10:41)  POCT Blood Glucose.: 163 mg/dL (19 Aug 2018 05:39)    Meds: insulin lispro (HumaLOG) corrective regimen sliding scale   SubCutaneous every 4 hours  predniSONE   Tablet 10 milliGRAM(s) Oral every 24 hours        ACCESS DEVICES:  [ ] Peripheral IV  [ ] Central Venous Line	[ ] R	[ ] L	[ ] IJ	[ ] Fem	[ ] SC	Placed:   [ ] Arterial Line		[ ] R	[ ] L	[ ] Fem	[ ] Rad	[ ] Ax	Placed:   [ ] PICC:					[ ] Mediport  [ ] Urinary Catheter, Date Placed:   [x] Necessity of urinary, arterial, and venous catheters discussed    OTHER MEDICATIONS:  chlorhexidine 4% Liquid 1 Application(s) Topical <User Schedule>  FIRST- Mouthwash  BLM 5 milliLiter(s) Swish and Spit daily  lidocaine   Patch 1 Patch Transdermal every 24 hours      CODE STATUS:      IMAGING: HISTORY  68y Female with PMH of asthma on steroids, CVA (no residual deficits), pulmonary HTN, HLD, GERD, ulcerative colitis, fatty pancreas, impaired glucose tolerance, anxiety, and depression who presented on 7/26/2018 with altered mental status, rigors, and urinary frequency. Of note, patient was recently hospitalized for a right femoral neck fracture secondary to osteoporosis s/p hemiarthroplasty on 6/22/2018. She had been taking Plavix for her history of CVA and Lovenox for VTE prophylaxis but she developed a hematoma at her surgical site so the Lovenox was stopped. Since then, patient has been progressively more agitated, confused, and weak. Additionally, patient has been complaining of new onset low back pain and left hip pain. In the ED, patient was hemodynamically stable but noted to be hypoglycemic. She was also started on meropenem & vancomycin for concern of sepsis. CT scan revealed a 17 cm collection adjacent to the right hip prosthesis, an acute L2 compression fracture, and new left acetabular fracture. Patient was admitted to medicine but on 7/27/2018, patient was noted to be more altered, rigorous, tachypneic, febrile to 101 F, and hypotensive w/ SBP in the 60s so an RRT was called. She was more fluid and started on a norepinephrine gtt. Patient subsequently admitted to SICU for hemodynamic monitoring. Blood cultures positive for MRSA. Decision was made to take the patient to the OR on 7/27/2018 for right hip I&D and exchange of the femoral head. She returned to the SICU intubated on vasopressor support. She was extubated on 7/30/2018 but was reintubated on 8/2/2018. Additionally, patient was persistently on vasopressor support and bacteremic with MRSA. Patient was subsequently taken back to the OR on 8/4/2018 for further right hip I&D, explantation of right hemiarthroplasty, placement of antibiotic spacer, and right femur ORIF. Patient was subsequently weaned off vasopressor support on 8/5/2018 and extubated on 8/6/2018 but required reintubation (2nd time) for tachypnea with accessory muscle use on 8/11/2018. Pt subsequently extubated 8/15/18.      24 HOUR EVENTS: no acute events over night     SUBJECTIVE/ROS:  [ ] A ten-point review of systems was otherwise negative except as noted.  [ ] Due to altered mental status/intubation, subjective information were not able to be obtained from the patient. History was obtained, to the extent possible, from review of the chart and collateral sources of information.      NEURO  RASS:     GCS:     CAM ICU:  Exam: awake, alert, oriented x 1  Meds: acetaminophen    Suspension. 650 milliGRAM(s) Enteral Tube every 6 hours PRN Mild Pain (1 - 3)  clonazePAM Tablet 2 milliGRAM(s) Oral at bedtime  melatonin 3 milliGRAM(s) Oral <User Schedule>  mirtazapine 45 milliGRAM(s) Oral <User Schedule>  QUEtiapine 50 milliGRAM(s) Oral <User Schedule>  sertraline 50 milliGRAM(s) Oral daily    [x] Adequacy of sedation and pain control has been assessed and adjusted      RESPIRATORY  RR: 33 (08-19-18 @ 23:00) (18 - 33)  SpO2: 99% (08-19-18 @ 23:18) (90% - 100%)  Wt(kg): --  Exam: unlabored, clear to auscultation bilaterally  Mechanical Ventilation:     [N/A] Extubation Readiness Assessed  Meds: ALBUTerol/ipratropium for Nebulization 3 milliLiter(s) Nebulizer every 6 hours  buDESOnide   0.5 milliGRAM(s) Respule 0.5 milliGRAM(s) Inhalation every 12 hours        CARDIOVASCULAR  HR: 104 (08-19-18 @ 23:18) (96 - 121)  BP: 154/76 (08-19-18 @ 23:00) (110/57 - 191/140)  BP(mean): 104 (08-19-18 @ 23:00) (80 - 145)  ABP: --  ABP(mean): --  Wt(kg): --  CVP(cm H2O): --      Exam: regular rate and rhythm  Cardiac Rhythm: sinus  Perfusion     [x]Adequate   [ ]Inadequate  Mentation   [x]Normal       [ ]Reduced  Extremities  [x]Warm         [ ]Cool  Volume Status [ ]Hypervolemic [x]Euvolemic [ ]Hypovolemic  Meds:       GI/NUTRITION  Exam: soft, nontender, nondistended, incision C/D/I  Diet: Tube feeds  Meds: famotidine    Tablet 20 milliGRAM(s) Oral daily      GENITOURINARY  I&O's Detail    08-18 @ 07:01  -  08-19 @ 07:00  --------------------------------------------------------  IN:    Pivot: 330 mL    sodium chloride 0.9%.: 240 mL  Total IN: 570 mL    OUT:    Indwelling Catheter - Urethral: 1465 mL  Total OUT: 1465 mL    Total NET: -895 mL      08-19 @ 07:01  - 08-20 @ 04:20  --------------------------------------------------------  IN:    Pivot: 330 mL    sodium chloride 0.9%.: 200 mL  Total IN: 530 mL    OUT:    Indwelling Catheter - Urethral: 1920 mL  Total OUT: 1920 mL    Total NET: -1390 mL          08-19    141  |  103  |  38<H>  ----------------------------<  169<H>  4.0   |  25  |  0.98    Ca    8.1<L>      19 Aug 2018 02:35  Phos  3.1     08-19  Mg     2.3     08-19      [X] Blackman catheter, indication: N/A  Meds: ascorbic acid 500 milliGRAM(s) Oral daily  iron sucrose IVPB 100 milliGRAM(s) IV Intermittent every 24 hours  multivitamin/minerals 1 Tablet(s) Oral daily  sodium chloride 0.9%. 1000 milliLiter(s) IV Continuous <Continuous>        HEMATOLOGIC  Meds: enoxaparin Injectable 40 milliGRAM(s) SubCutaneous daily    [x] VTE Prophylaxis                        8.8    10.4  )-----------( 650      ( 19 Aug 2018 02:35 )             28.8       Transfusion     [ ] PRBC   [ ] Platelets   [ ] FFP   [ ] Cryoprecipitate      INFECTIOUS DISEASES    RECENT CULTURES:    Meds: vancomycin  IVPB 750 milliGRAM(s) IV Intermittent every 24 hours        ENDOCRINE  CAPILLARY BLOOD GLUCOSE      POCT Blood Glucose.: 74 mg/dL (19 Aug 2018 17:35)  POCT Blood Glucose.: 164 mg/dL (19 Aug 2018 15:02)  POCT Blood Glucose.: 164 mg/dL (19 Aug 2018 14:59)  POCT Blood Glucose.: >600 mg/dL (19 Aug 2018 14:57)  POCT Blood Glucose.: 102 mg/dL (19 Aug 2018 10:41)  POCT Blood Glucose.: 163 mg/dL (19 Aug 2018 05:39)    Meds: insulin lispro (HumaLOG) corrective regimen sliding scale   SubCutaneous every 4 hours  predniSONE   Tablet 10 milliGRAM(s) Oral every 24 hours        ACCESS DEVICES:  [ ] Peripheral IV  [ ] Central Venous Line	[ ] R	[ ] L	[ ] IJ	[ ] Fem	[ ] SC	Placed:   [ ] Arterial Line		[ ] R	[ ] L	[ ] Fem	[ ] Rad	[ ] Ax	Placed:   [x] PICC:					[ ] Mediport  [ ] Urinary Catheter, Date Placed:   [x] Necessity of urinary, arterial, and venous catheters discussed    OTHER MEDICATIONS:  chlorhexidine 4% Liquid 1 Application(s) Topical <User Schedule>  FIRST- Mouthwash  BLM 5 milliLiter(s) Swish and Spit daily  lidocaine   Patch 1 Patch Transdermal every 24 hours      CODE STATUS: full code      IMAGING:

## 2018-08-20 NOTE — SWALLOW BEDSIDE ASSESSMENT ADULT - PHARYNGEAL PHASE
significantly delayed pharyngeal swallows triggered post verbal cues, tactile stimulation/Delayed pharyngeal swallow/Decreased laryngeal elevation
Delayed pharyngeal swallow/Multiple swallows/Throat clear post oral intake

## 2018-08-21 DIAGNOSIS — R49.0 DYSPHONIA: ICD-10-CM

## 2018-08-21 LAB
ANION GAP SERPL CALC-SCNC: 11 MMOL/L — SIGNIFICANT CHANGE UP (ref 5–17)
BLD GP AB SCN SERPL QL: NEGATIVE — SIGNIFICANT CHANGE UP
BUN SERPL-MCNC: 33 MG/DL — HIGH (ref 7–23)
CALCIUM SERPL-MCNC: 8.4 MG/DL — SIGNIFICANT CHANGE UP (ref 8.4–10.5)
CHLORIDE SERPL-SCNC: 104 MMOL/L — SIGNIFICANT CHANGE UP (ref 96–108)
CO2 SERPL-SCNC: 27 MMOL/L — SIGNIFICANT CHANGE UP (ref 22–31)
CREAT SERPL-MCNC: 0.96 MG/DL — SIGNIFICANT CHANGE UP (ref 0.5–1.3)
GLUCOSE SERPL-MCNC: 113 MG/DL — HIGH (ref 70–99)
HCT VFR BLD CALC: 28.2 % — LOW (ref 34.5–45)
HCT VFR BLD CALC: 31.7 % — LOW (ref 34.5–45)
HCT VFR BLD CALC: 49.6 % — HIGH (ref 34.5–45)
HGB BLD-MCNC: 10 G/DL — LOW (ref 11.5–15.5)
HGB BLD-MCNC: 15.7 G/DL — HIGH (ref 11.5–15.5)
HGB BLD-MCNC: 9 G/DL — LOW (ref 11.5–15.5)
INR BLD: 1.09 RATIO — SIGNIFICANT CHANGE UP (ref 0.88–1.16)
MAGNESIUM SERPL-MCNC: 1.8 MG/DL — SIGNIFICANT CHANGE UP (ref 1.6–2.6)
MCHC RBC-ENTMCNC: 28.7 PG — SIGNIFICANT CHANGE UP (ref 27–34)
MCHC RBC-ENTMCNC: 28.8 PG — SIGNIFICANT CHANGE UP (ref 27–34)
MCHC RBC-ENTMCNC: 29 PG — SIGNIFICANT CHANGE UP (ref 27–34)
MCHC RBC-ENTMCNC: 31.5 GM/DL — LOW (ref 32–36)
MCHC RBC-ENTMCNC: 31.6 GM/DL — LOW (ref 32–36)
MCHC RBC-ENTMCNC: 31.8 GM/DL — LOW (ref 32–36)
MCV RBC AUTO: 90.9 FL — SIGNIFICANT CHANGE UP (ref 80–100)
MCV RBC AUTO: 91.3 FL — SIGNIFICANT CHANGE UP (ref 80–100)
MCV RBC AUTO: 91.3 FL — SIGNIFICANT CHANGE UP (ref 80–100)
PHOSPHATE SERPL-MCNC: 3.1 MG/DL — SIGNIFICANT CHANGE UP (ref 2.5–4.5)
PLATELET # BLD AUTO: 399 K/UL — SIGNIFICANT CHANGE UP (ref 150–400)
PLATELET # BLD AUTO: 443 K/UL — HIGH (ref 150–400)
PLATELET # BLD AUTO: 571 K/UL — HIGH (ref 150–400)
POTASSIUM SERPL-MCNC: 3.5 MMOL/L — SIGNIFICANT CHANGE UP (ref 3.5–5.3)
POTASSIUM SERPL-SCNC: 3.5 MMOL/L — SIGNIFICANT CHANGE UP (ref 3.5–5.3)
PROTHROM AB SERPL-ACNC: 11.8 SEC — SIGNIFICANT CHANGE UP (ref 9.8–12.7)
RBC # BLD: 3.09 M/UL — LOW (ref 3.8–5.2)
RBC # BLD: 3.47 M/UL — LOW (ref 3.8–5.2)
RBC # BLD: 5.46 M/UL — HIGH (ref 3.8–5.2)
RBC # FLD: 13.7 % — SIGNIFICANT CHANGE UP (ref 10.3–14.5)
RBC # FLD: 14.2 % — SIGNIFICANT CHANGE UP (ref 10.3–14.5)
RBC # FLD: 14.4 % — SIGNIFICANT CHANGE UP (ref 10.3–14.5)
RH IG SCN BLD-IMP: NEGATIVE — SIGNIFICANT CHANGE UP
SODIUM SERPL-SCNC: 142 MMOL/L — SIGNIFICANT CHANGE UP (ref 135–145)
TROPONIN T, HIGH SENSITIVITY RESULT: 176 NG/L — HIGH (ref 0–51)
VANCOMYCIN TROUGH SERPL-MCNC: 21.5 UG/ML — HIGH (ref 10–20)
WBC # BLD: 11 K/UL — HIGH (ref 3.8–10.5)
WBC # BLD: 11.6 K/UL — HIGH (ref 3.8–10.5)
WBC # BLD: 13.1 K/UL — HIGH (ref 3.8–10.5)
WBC # FLD AUTO: 11 K/UL — HIGH (ref 3.8–10.5)
WBC # FLD AUTO: 11.6 K/UL — HIGH (ref 3.8–10.5)
WBC # FLD AUTO: 13.1 K/UL — HIGH (ref 3.8–10.5)

## 2018-08-21 PROCEDURE — 99223 1ST HOSP IP/OBS HIGH 75: CPT

## 2018-08-21 PROCEDURE — 93010 ELECTROCARDIOGRAM REPORT: CPT

## 2018-08-21 PROCEDURE — 99231 SBSQ HOSP IP/OBS SF/LOW 25: CPT

## 2018-08-21 PROCEDURE — 99222 1ST HOSP IP/OBS MODERATE 55: CPT | Mod: 25

## 2018-08-21 PROCEDURE — 31575 DIAGNOSTIC LARYNGOSCOPY: CPT

## 2018-08-21 PROCEDURE — 71045 X-RAY EXAM CHEST 1 VIEW: CPT | Mod: 26,77

## 2018-08-21 PROCEDURE — 71045 X-RAY EXAM CHEST 1 VIEW: CPT | Mod: 26

## 2018-08-21 RX ORDER — FAMOTIDINE 10 MG/ML
20 INJECTION INTRAVENOUS DAILY
Qty: 0 | Refills: 0 | Status: DISCONTINUED | OUTPATIENT
Start: 2018-08-21 | End: 2018-08-27

## 2018-08-21 RX ORDER — PANTOPRAZOLE SODIUM 20 MG/1
40 TABLET, DELAYED RELEASE ORAL ONCE
Qty: 0 | Refills: 0 | Status: DISCONTINUED | OUTPATIENT
Start: 2018-08-21 | End: 2018-08-21

## 2018-08-21 RX ORDER — PANTOPRAZOLE SODIUM 20 MG/1
40 TABLET, DELAYED RELEASE ORAL ONCE
Qty: 0 | Refills: 0 | Status: DISCONTINUED | OUTPATIENT
Start: 2018-08-21 | End: 2018-08-27

## 2018-08-21 RX ORDER — INSULIN LISPRO 100/ML
VIAL (ML) SUBCUTANEOUS EVERY 6 HOURS
Qty: 0 | Refills: 0 | Status: DISCONTINUED | OUTPATIENT
Start: 2018-08-21 | End: 2018-08-29

## 2018-08-21 RX ORDER — VANCOMYCIN HCL 1 G
500 VIAL (EA) INTRAVENOUS EVERY 24 HOURS
Qty: 0 | Refills: 0 | Status: DISCONTINUED | OUTPATIENT
Start: 2018-08-23 | End: 2018-09-15

## 2018-08-21 RX ADMIN — Medication 1: at 18:58

## 2018-08-21 RX ADMIN — IRON SUCROSE 210 MILLIGRAM(S): 20 INJECTION, SOLUTION INTRAVENOUS at 15:22

## 2018-08-21 RX ADMIN — CHLORHEXIDINE GLUCONATE 1 APPLICATION(S): 213 SOLUTION TOPICAL at 07:09

## 2018-08-21 RX ADMIN — Medication 3 MILLILITER(S): at 00:31

## 2018-08-21 RX ADMIN — Medication 250 MILLIGRAM(S): at 12:30

## 2018-08-21 RX ADMIN — Medication 10 MILLIGRAM(S): at 11:46

## 2018-08-21 RX ADMIN — QUETIAPINE FUMARATE 50 MILLIGRAM(S): 200 TABLET, FILM COATED ORAL at 22:45

## 2018-08-21 RX ADMIN — LIDOCAINE 1 PATCH: 4 CREAM TOPICAL at 11:45

## 2018-08-21 RX ADMIN — Medication 500 MILLIGRAM(S): at 11:46

## 2018-08-21 RX ADMIN — Medication 1 TABLET(S): at 11:46

## 2018-08-21 RX ADMIN — Medication 2 MILLIGRAM(S): at 22:45

## 2018-08-21 RX ADMIN — FAMOTIDINE 20 MILLIGRAM(S): 10 INJECTION INTRAVENOUS at 11:47

## 2018-08-21 RX ADMIN — Medication 3 MILLILITER(S): at 11:46

## 2018-08-21 RX ADMIN — Medication 3 MILLILITER(S): at 07:08

## 2018-08-21 RX ADMIN — ENOXAPARIN SODIUM 40 MILLIGRAM(S): 100 INJECTION SUBCUTANEOUS at 11:46

## 2018-08-21 RX ADMIN — FAMOTIDINE 20 MILLIGRAM(S): 10 INJECTION INTRAVENOUS at 22:45

## 2018-08-21 RX ADMIN — Medication 0.5 MILLIGRAM(S): at 11:46

## 2018-08-21 RX ADMIN — Medication 3 MILLILITER(S): at 18:34

## 2018-08-21 RX ADMIN — MIRTAZAPINE 45 MILLIGRAM(S): 45 TABLET, ORALLY DISINTEGRATING ORAL at 22:45

## 2018-08-21 RX ADMIN — SERTRALINE 50 MILLIGRAM(S): 25 TABLET, FILM COATED ORAL at 11:46

## 2018-08-21 RX ADMIN — Medication 3 MILLIGRAM(S): at 22:45

## 2018-08-21 NOTE — PROGRESS NOTE ADULT - ASSESSMENT
dysphagia     pelvic fracture        iron deficiency    pelvic fracture    spoke at l;wily with  for difficulty swallowing about therisks and benefits of peg placement

## 2018-08-21 NOTE — PROGRESS NOTE ADULT - SUBJECTIVE AND OBJECTIVE BOX
CC: f/u for mrsa bacteremia and infected right thr    Patient reports nothing intelligible    REVIEW OF SYSTEMS:  All other review of systems negative (Comprehensive ROS)    Antimicrobials Day #  :17/42  vancomycin  IVPB 750 milliGRAM(s) IV Intermittent every 24 hours    Other Medications Reviewed    T(F): 98.6 (08-21-18 @ 10:46), Max: 99 (08-20-18 @ 15:00)  HR: 104 (08-21-18 @ 10:46)  BP: 158/84 (08-21-18 @ 10:46)  RR: 25 (08-21-18 @ 10:46)  SpO2: 97% (08-21-18 @ 10:46)  Wt(kg): --    PHYSICAL EXAM:  General: awake no acute distress  Eyes:  anicteric, no conjunctival injection, no discharge  Oropharynx: no lesions or injection 	  Neck: supple, without adenopathy  Lungs: decreased at bases to auscultation  Heart: regular rate and rhythm; no murmur, rubs or gallops  Abdomen: soft, nondistended, nontender, without mass or organomegaly  Skin: no lesions  Extremities: no clubbing, cyanosis. right hip wound clean  Neurologic: awake moves all extremities    LAB RESULTS:                        9.0    11.6  )-----------( 443      ( 21 Aug 2018 10:38 )             28.2     08-21    142  |  104  |  33<H>  ----------------------------<  113<H>  3.5   |  27  |  0.96    Ca    8.4      21 Aug 2018 08:45  Phos  3.1     08-21  Mg     1.8     08-21          MICROBIOLOGY:  RECENT CULTURES:    Culture - Blood (08.12.18 @ 00:16)    Specimen Source: .Blood Blood-Arterial    Culture Results:   No growth at 5 days.      RADIOLOGY REVIEWED:  < from: Xray Chest 1 View- PORTABLE-Routine (08.21.18 @ 09:58) >    IMPRESSION:     The left-sided PICC is in place with its tip in the azygos vein,   recommend adjustment.    Unchanged hazy opacities of the right lung.    Dr. Mckinnon discussed these findings with Dr. Armenta on 8/21/2018 12:00 PM,   with read back.    < end of copied text >      Assessment:  Patient with infected right hip prosthesis s/p washout but failed to clear cultures so had source control by leisa. Last week she had severe sepsis with respiratory failure, possible pneumonia and got a course of meropenem. She now has no uncontrolled infection apparent at present. She is on vanco instead of dapto due to concern for pneumonitis and is off ceftarolene for a rash.   Plan: continue vancomycin , hold dose tomorrow and restart at 500mg daily on 8/23  supportive care

## 2018-08-21 NOTE — SWALLOW FEES ASSESSMENT ADULT - SLP PERTINENT HISTORY OF CURRENT PROBLEM
68F pmhx of early onset Alzheimer disease, recent hip fracture from osteoporosis s/p repair last month, anxiety/depression, asthma, HLD and GERD who presents with severe sepsis and acute back pain and LE weakness.  Pt has new onset of LLE weakness and inability to walk and CT shows acute left pelvic fracture with L2 compression fracture. Severe sepsis 2/2 possible infected joint or spinal abscess vs. less likely UTI.  Pt has new onset severe back pain and LLE weakness of sudden onset last night due to pelvic fracture but also with rigors, fevers, chills and significant bandemia, leukocytosis, tachycardia and lactate >2. Vertebral fracture along with pelvic fracture likely cause but in septic setting will also get MRI to r/o spinal abscess. Pt has Alzheimers at baseline but is more confused than normal likely 2/2 underlying sepsis vs. hypoglycemia.  CT head showed no acute new infarction.

## 2018-08-21 NOTE — CHART NOTE - NSCHARTNOTEFT_GEN_A_CORE
Asked to evaluate patient who was waiting to have her PIC line re-adjusted @ [facility]  As per daughters, pt became SOB and signaled that she was suffering from "chest discomfort"    Upon arrival, pt seemed uncomfortable yet non-dyspneic. Unable to elicit solid history 2/2 h/o dementia    V/S:  160 /85 - 106R 18-20 96% R/A  EKG: S/Tach @ 106 w/ OLD ST changes / RBB / inferior lead changes (NO acute change from office ECG 6/18)    O/E: Awake, follows commands, in NAD          Conjunctiva pink, (-)       ***See Above  Tad QUEEN  Orthopedics  B: [phone]  S: [phone] Asked to evaluate patient who was waiting to have her PIC line re-adjusted @ [facility]  As per daughters, pt became SOB and signaled that she was suffering from "chest discomfort"    Upon arrival, pt seemed uncomfortable yet non-dyspneic. Unable to elicit solid history 2/2 h/o dementia    V/S:  160 /85 - 106R 18-20 96% R/A  EKG: S/Tach @ 106 w/ OLD ST changes / RBB / inferior lead changes (NO acute change from office ECG 6/18)    O/E: Awake, follows commands, in NAD          Conjunctiva pink, (-) cyanosis          NGT in place        CHEST:  CTA No rales/ rhonci / wheezes        ABD:  benign        Ext:  No changes    Pt transferred back to floor  Seemed more calm, less agitated  v/s:  140/82  106  16  96%       Plan:    Troponin / cbc / sma-7 / pt/inr  T&S sent  CXR ordered  12 lead EKG  Monitor closely  spoke w/ PMD:  Dr Bolden : [phone]    Agrees w/ Above    Pt's Cardiologist called Dr Leiva : [phone]                  ***See Above  Tad QUEEN  Orthopedics  B: [phone]  S: [phone]

## 2018-08-21 NOTE — SWALLOW FEES ASSESSMENT ADULT - SLP GENERAL OBSERVATIONS
Pt received in bed. Asleep. Awake with minimal verbal cues. + confusion. Hypophonic vocal quality. + b/l wrist restraints.

## 2018-08-21 NOTE — CONSULT NOTE ADULT - SUBJECTIVE AND OBJECTIVE BOX
Patient seen and evaluated @ 10pm on 9 [facility] with her  at bedside  Chief Complaint: Chest pain (resolved), elevated troponin    HPI:  68F pmhx of asthma, anxiety/depression, HLD, UC, pHTN presents with weakness.  She was recently hospitalized here for a right hip fracture s/p repair last month.  She is chrissie in by her family who say that on Monday she had the staples removed from her hip surgery.  She was 2 weeks ago developed a hematoma on the right hip after using Plavix with Lovenox for PPX after orhto surgery so this was stopped.  She was doing fine this week until last night when she said that she had to go to the bathroom "30 times" per the  and could not urinate or have BM.  She kept trying to get out of bed and walk to the bathroom and she usually walks with a walker with some assistance but last night she actuely became weak and not able to walk with walker and 2 person assist.  She also was brought to the ED 2 weeks ago for new onset severe back pain with radiation to her left hip.  Her  says this presentation was similar to when she broke her right hip last month (has osteoperosis) and was concerned again but after ED visit said she did not break her hip.  She has been taking her Oxycodone since the surgery Q4hrs w/o pain relief.  Last night she developed rigors, did not take temperature, urinary frequency, increased confusion and agitation along with back pain and LE weakness so she was brought days.     In ED:  T 99.6 rectally HR 60-92 /58 RR 18  She was seen by orthopedics for concern of joint infection in the right hip, given Meropenum and Vancomycin, 2LNs and also 2 amps of D50 for symptomatic (AMS) hypoglycemia. (26 Jul 2018 16:54)    PMH:   CVA (cerebral vascular accident)  Fatty pancreas  Fatty liver  PNA (pneumonia)  Pulmonary HTN  IGT (impaired glucose tolerance)  Ulcerative colitis  Acid reflux  Anxiety  Depression  Mouth sores  HLD (hyperlipidemia)  Asthma    PSH:   Humeral head fracture  H/O: hysterectomy  H/O cataract extraction, left  History of knee replacement    Medications:   acetaminophen    Suspension. 650 milliGRAM(s) Enteral Tube every 6 hours PRN  ALBUTerol/ipratropium for Nebulization 3 milliLiter(s) Nebulizer every 6 hours  ascorbic acid 500 milliGRAM(s) Oral daily  buDESOnide   0.5 milliGRAM(s) Respule 0.5 milliGRAM(s) Inhalation every 12 hours  chlorhexidine 4% Liquid 1 Application(s) Topical <User Schedule>  clonazePAM Tablet 2 milliGRAM(s) Oral at bedtime  enoxaparin Injectable 40 milliGRAM(s) SubCutaneous daily  famotidine    Tablet 20 milliGRAM(s) Oral daily  FIRST- Mouthwash  BLM 5 milliLiter(s) Swish and Spit daily  insulin lispro (HumaLOG) corrective regimen sliding scale   SubCutaneous every 6 hours  iron sucrose IVPB 100 milliGRAM(s) IV Intermittent every 24 hours  lidocaine   Patch 1 Patch Transdermal every 24 hours  melatonin 3 milliGRAM(s) Oral <User Schedule>  mirtazapine 45 milliGRAM(s) Oral <User Schedule>  multivitamin/minerals 1 Tablet(s) Oral daily  pantoprazole   Suspension 40 milliGRAM(s) Oral once  predniSONE   Tablet 10 milliGRAM(s) Oral every 24 hours  QUEtiapine 50 milliGRAM(s) Oral <User Schedule>  sertraline 50 milliGRAM(s) Oral daily  sodium chloride 0.9%. 1000 milliLiter(s) IV Continuous <Continuous>    Allergies:  ASA; dye contrast (Anaphylaxis)  aspirin (Short breath)  divalproex sodium (Other (Unknown))  Haldol (Other (Unknown))  penicillin (Short breath; Rash)  sulfa drugs (Short breath; Rash)  Xanax (Other (Unknown))    FAMILY HISTORY:  Family history of dementia (Grandparent)  Family history of colon cancer (Grandparent)    Social History: , lives with   Smoking: nonsmoker  Alcohol: no EtOH    Review of Systems:  [X]Unable to obtain reliable review of systems given dementia, but patient currently denies complaints  Constitutional: No fever, chills, fatigue, or changes in weight  HEENT: No blurry vision, eye pain, headache, runny nose, or sore throat  Respiratory: No shortness of breath, cough, or wheezing  Cardiovascular: No chest pain or palpitations  Gastrointestinal: No nausea, vomiting, diarrhea, or abdominal pain  Genitourinary: No dysuria or incontinence  Extremities: No lower extremity swelling  Neurologic: No focal findings  Lymphatic: No lymphadenopathy   Skin: No rash  Psychiatry: No anxiety or depression  10 point review of systems is otherwise negative except as mentioned above            Physical Exam:  T(C): 36.6 (08-21-18 @ 22:14), Max: 37.2 (08-21-18 @ 00:00)  HR: 100 (08-21-18 @ 22:14) (100 - 107)  BP: 146/76 (08-21-18 @ 22:14) (134/75 - 167/73)  RR: 22 (08-21-18 @ 22:14) (22 - 31)  SpO2: 98% (08-21-18 @ 22:14) (97% - 98%)  Wt(kg): --    08-20 @ 07:01  -  08-21 @ 07:00  --------------------------------------------------------  IN: 1020 mL / OUT: 235 mL / NET: 785 mL    08-21 @ 07:01  -  08-21 @ 22:24  --------------------------------------------------------  IN: 0 mL / OUT: 0 mL / NET: 0 mL      Daily     Daily     Appearance: Normal, well groomed, NAD  Eyes: PERRLA, EOMI, pink conjunctiva, no scleral icterus   HENT: Normal oral mucosa; +NGT tube   Cardiovascular: RRR, S1, S2, no murmur, rub, or gallop; no edema; no JVD  Respiratory: Clear to auscultation bilaterally  Gastrointestinal: Soft, non-tender, non-distended, BS+  Musculoskeletal: No clubbing or joint deformity   Neurologic: No focal weakness  Lymphatic: No lymphadenopathy  Psychiatry: awake and alert  Skin: No rashes, ecchymoses, or cyanosis    Cardiovascular Diagnostic Testing:    ECG: sinus tachycardia at 106 bpm, fusion complex x1, RBBB, unchanged compared with outpatient ECG 11/13/2017    Echo: < from: Transesophageal Echocardiogram (07.30.18 @ 16:42) >  ------------------------------------------------------------------------  Conclusions:  1. Tethered , mildly calcified mitral valve leaflets with  normal opening. Mild-moderate mitral regurgitation.  2. Sclerotic aortic valve leaflets with normal opening.  Mild aortic regurgitation.  3. Severe global left ventricular systolic dysfunction.  4. Right ventricular enlargement with decreased right  ventricular systolic function.  5. Thickened tricuspid valve. Mild-moderate tricuspid  regurgitation.  6. Color Doppler demonstrates evidence of a patent foramen  ovale.  7. No evidence of valvular vegetation is seen.  ------------------------------------------------------------------------  Confirmed on  8/1/2018 - 15:06:58 by Margaret Khan M.D.  ------------------------------------------------------------------------    < end of copied text >    < from: TTE with Doppler (w/Cont) (08.17.18 @ 20:12) >  ------------------------------------------------------------------------  Conclusions:  1. Endocardial visualization enhanced with intravenous  injection of Ultrasonic Enhancing Agent (Definity). Left  ventricle suboptimally visualized despite the use of  intravenous echo contrast; overall normal left ventricular  systolic function. The mid inferoseptum appears hypokinetic  on certain views.  2. The right ventricle is notwell visualized; grossly  normal right ventricular size and systolic function.  *** Compared with echocardiogram of 7/30/2018, there has  been improvement of LV and RV systolic function.  ------------------------------------------------------------------------  Confirmed on  8/18/2018 - 12:38:38 by Belkys Altman M.D.  ------------------------------------------------------------------------    < end of copied text >      Stress Testing:    Cath:    Interpretation of Telemetry:    Imaging:    Labs:                        15.7   11.0  )-----------( 399      ( 21 Aug 2018 18:10 )             49.6     08-21    143  |  105  |  32<H>  ----------------------------<  184<H>  4.1   |  25  |  0.93    Ca    8.6      21 Aug 2018 17:46  Phos  3.1     08-21  Mg     1.8     08-21      PT/INR - ( 21 Aug 2018 17:46 )   PT: 11.8 sec;   INR: 1.09 ratio Patient seen and evaluated @ 10pm on 9 [place] with her  at bedside  Chief Complaint: Chest pain (resolved), elevated troponin    HPI:  68F pmhx of asthma, anxiety/depression, HLD, UC, pHTN presents with weakness.  She was recently hospitalized here for a right hip fracture s/p repair last month.  She is chrissie in by her family who say that on Monday she had the staples removed from her hip surgery.  She was 2 weeks ago developed a hematoma on the right hip after using Plavix with Lovenox for PPX after orhto surgery so this was stopped.  She was doing fine this week until last night when she said that she had to go to the bathroom "30 times" per the  and could not urinate or have BM.  She kept trying to get out of bed and walk to the bathroom and she usually walks with a walker with some assistance but last night she actuely became weak and not able to walk with walker and 2 person assist.  She also was brought to the ED 2 weeks ago for new onset severe back pain with radiation to her left hip.  Her  says this presentation was similar to when she broke her right hip last month (has osteoperosis) and was concerned again but after ED visit said she did not break her hip.  She has been taking her Oxycodone since the surgery Q4hrs w/o pain relief.  Last night she developed rigors, did not take temperature, urinary frequency, increased confusion and agitation along with back pain and LE weakness so she was brought days.     In ED:  T 99.6 rectally HR 60-92 /58 RR 18  She was seen by orthopedics for concern of joint infection in the right hip, given Meropenum and Vancomycin, 2LNs and also 2 amps of D50 for symptomatic (AMS) hypoglycemia. (26 Jul 2018 16:54)    August 21, 2018 - After PICC line readjusted, patient became anxious. She reportedly told her daughters she was short of breath and having chest pain. The symptoms resolved without intervention. First set of high sensitivity troponin was seen to be elevated at 176 ng/L.    PMH:   CVA (cerebral vascular accident)  Fatty pancreas  Fatty liver  PNA (pneumonia)  Pulmonary HTN  IGT (impaired glucose tolerance)  Ulcerative colitis  Acid reflux  Anxiety  Depression  Mouth sores  HLD (hyperlipidemia)  Asthma    PSH:   Humeral head fracture  H/O: hysterectomy  H/O cataract extraction, left  History of knee replacement    Medications:   acetaminophen    Suspension. 650 milliGRAM(s) Enteral Tube every 6 hours PRN  ALBUTerol/ipratropium for Nebulization 3 milliLiter(s) Nebulizer every 6 hours  ascorbic acid 500 milliGRAM(s) Oral daily  buDESOnide   0.5 milliGRAM(s) Respule 0.5 milliGRAM(s) Inhalation every 12 hours  chlorhexidine 4% Liquid 1 Application(s) Topical <User Schedule>  clonazePAM Tablet 2 milliGRAM(s) Oral at bedtime  enoxaparin Injectable 40 milliGRAM(s) SubCutaneous daily  famotidine    Tablet 20 milliGRAM(s) Oral daily  FIRST- Mouthwash  BLM 5 milliLiter(s) Swish and Spit daily  insulin lispro (HumaLOG) corrective regimen sliding scale   SubCutaneous every 6 hours  iron sucrose IVPB 100 milliGRAM(s) IV Intermittent every 24 hours  lidocaine   Patch 1 Patch Transdermal every 24 hours  melatonin 3 milliGRAM(s) Oral <User Schedule>  mirtazapine 45 milliGRAM(s) Oral <User Schedule>  multivitamin/minerals 1 Tablet(s) Oral daily  pantoprazole   Suspension 40 milliGRAM(s) Oral once  predniSONE   Tablet 10 milliGRAM(s) Oral every 24 hours  QUEtiapine 50 milliGRAM(s) Oral <User Schedule>  sertraline 50 milliGRAM(s) Oral daily  sodium chloride 0.9%. 1000 milliLiter(s) IV Continuous <Continuous>    Allergies:  ASA; dye contrast (Anaphylaxis)  aspirin (Short breath)  divalproex sodium (Other (Unknown))  Haldol (Other (Unknown))  penicillin (Short breath; Rash)  sulfa drugs (Short breath; Rash)  Xanax (Other (Unknown))    FAMILY HISTORY:  Family history of dementia (Grandparent)  Family history of colon cancer (Grandparent)    Social History: , lives with   Smoking: nonsmoker  Alcohol: no EtOH    Review of Systems:  [X]Unable to obtain reliable review of systems given dementia, but patient currently denies complaints  Constitutional: No fever, chills, fatigue, or changes in weight  HEENT: No blurry vision, eye pain, headache, runny nose, or sore throat  Respiratory: No shortness of breath, cough, or wheezing  Cardiovascular: No chest pain or palpitations  Gastrointestinal: No nausea, vomiting, diarrhea, or abdominal pain  Genitourinary: No dysuria or incontinence  Extremities: No lower extremity swelling  Neurologic: No focal findings  Lymphatic: No lymphadenopathy   Skin: No rash  Psychiatry: No anxiety or depression  10 point review of systems is otherwise negative except as mentioned above            Physical Exam:  T(C): 36.6 (08-21-18 @ 22:14), Max: 37.2 (08-21-18 @ 00:00)  HR: 100 (08-21-18 @ 22:14) (100 - 107)  BP: 146/76 (08-21-18 @ 22:14) (134/75 - 167/73)  RR: 22 (08-21-18 @ 22:14) (22 - 31)  SpO2: 98% (08-21-18 @ 22:14) (97% - 98%)  Wt(kg): --    08-20 @ 07:01  -  08-21 @ 07:00  --------------------------------------------------------  IN: 1020 mL / OUT: 235 mL / NET: 785 mL    08-21 @ 07:01  -  08-21 @ 22:24  --------------------------------------------------------  IN: 0 mL / OUT: 0 mL / NET: 0 mL      Daily     Daily     Appearance: Normal, well groomed, NAD  Eyes: PERRLA, EOMI, pink conjunctiva, no scleral icterus   HENT: Normal oral mucosa; +NGT tube   Cardiovascular: RRR, S1, S2, no murmur, rub, or gallop; no edema; no JVD  Respiratory: Clear to auscultation bilaterally  Gastrointestinal: Soft, non-tender, non-distended, BS+  Musculoskeletal: No clubbing or joint deformity   Neurologic: No focal weakness  Lymphatic: No lymphadenopathy  Psychiatry: awake and alert  Skin: No rashes, ecchymoses, or cyanosis    Cardiovascular Diagnostic Testing:    ECG: sinus tachycardia at 106 bpm, fusion complex x1, RBBB, unchanged compared with outpatient ECG 11/13/2017    Echo: < from: Transesophageal Echocardiogram (07.30.18 @ 16:42) >  ------------------------------------------------------------------------  Conclusions:  1. Tethered , mildly calcified mitral valve leaflets with  normal opening. Mild-moderate mitral regurgitation.  2. Sclerotic aortic valve leaflets with normal opening.  Mild aortic regurgitation.  3. Severe global left ventricular systolic dysfunction.  4. Right ventricular enlargement with decreased right  ventricular systolic function.  5. Thickened tricuspid valve. Mild-moderate tricuspid  regurgitation.  6. Color Doppler demonstrates evidence of a patent foramen  ovale.  7. No evidence of valvular vegetation is seen.  ------------------------------------------------------------------------  Confirmed on  8/1/2018 - 15:06:58 by Margaret Khan M.D.  ------------------------------------------------------------------------    < end of copied text >    < from: TTE with Doppler (w/Cont) (08.17.18 @ 20:12) >  ------------------------------------------------------------------------  Conclusions:  1. Endocardial visualization enhanced with intravenous  injection of Ultrasonic Enhancing Agent (Definity). Left  ventricle suboptimally visualized despite the use of  intravenous echo contrast; overall normal left ventricular  systolic function. The mid inferoseptum appears hypokinetic  on certain views.  2. The right ventricle is notwell visualized; grossly  normal right ventricular size and systolic function.  *** Compared with echocardiogram of 7/30/2018, there has  been improvement of LV and RV systolic function.  ------------------------------------------------------------------------  Confirmed on  8/18/2018 - 12:38:38 by Belkys Altman M.D.  ------------------------------------------------------------------------    < end of copied text >    Labs:                        15.7   11.0  )-----------( 399      ( 21 Aug 2018 18:10 )             49.6     08-21    143  |  105  |  32<H>  ----------------------------<  184<H>  4.1   |  25  |  0.93    Ca    8.6      21 Aug 2018 17:46  Phos  3.1     08-21  Mg     1.8     08-21      PT/INR - ( 21 Aug 2018 17:46 )   PT: 11.8 sec;   INR: 1.09 ratio

## 2018-08-21 NOTE — CONSULT NOTE ADULT - ASSESSMENT
68y Female w/ a pmhx of asthma, anxiety/depression, HLD, UC, pHTN presents with weakness.  She was recently hospitalized here for a right hip fracture s/p repair last month.  Patient with infected right hip prosthesis s/p washout but failed to clear cultures so had source control by [facility]. Last week she had severe sepsis with respiratory failure, requiring Intubation.  FOE reveals patent non obstructive airway with prominent pharyngeal wall, B/L VC mobile and intact

## 2018-08-21 NOTE — SWALLOW FEES ASSESSMENT ADULT - COMMENTS
Visualization poor 2/2 patient movement throughout exam, narrow pharynx, presence of secretions. Per SICU note 8/19:   In the ED, patient was hemodynamically stable but noted to be hypoglycemic. She was also started on meropenem & vancomycin for concern of sepsis. CT scan revealed a 17 cm collection adjacent to the right hip prosthesis, an acute L2 compression fracture, and new left acetabular fracture. Patient was admitted to medicine but on 7/27/2018, patient was noted to be more altered, rigorous, tachypneic, febrile to 101 F, and hypotensive w/ SBP in the 60s so an RRT was called. Pt given fluid and started on a norepinephrine gtt. Patient subsequently admitted to SICU for hemodynamic monitoring. Blood cultures positive for MRSA-abx coverage given. Decision was made to take the patient to the OR on 7/27/2018 for right hip I&D and exchange of the femoral head.  She returned to the SICU intubated on vasopressor support. She was extubated on 7/30/2018 but was reintubated on 8/2/2018. Patient was taken back to the OR on 8/4/2018 for further right hip I&D, explantation of right hemiarthroplasty, placement of antibiotic spacer, and right femur ORIF. Patient weaned off vasopressor support on 8/5/2018 extubated on 8/6/2018, but reintubated for respiratory distress on 8/10 and extubated on 8/15. Per team pt will likely need PEG.     CXRAY 8/19:   IMPRESSION:   NG tube appropriately situated in the stomach.  Unchanged bilateral pleural-parenchymal pattern.

## 2018-08-21 NOTE — CONSULT NOTE ADULT - ASSESSMENT
68 year-old woman with Alzheimer's dementia seen to have episode of periprocedural anxiety, shortness of breath, and chest discomfort that resolved spontaneously and has not recurred.  Her most recent TTE shows interval improvement in LV systolic function.    First set of high sensitivity troponin was elevated.  Will follow-up second set of enzymes.    No plans for cardiac interventions at this time.  If patient seen to have rising troponin, this will be medically managed.

## 2018-08-21 NOTE — SWALLOW FEES ASSESSMENT ADULT - PRELIMINARY ENDOSCOPIC EXAMINATIONS
Thick white secretions adherent to posterior pharyngeal wall not cleared with cued cough, multiple swallows. Secretions eventually partially reduced with liquid wash; + cough productive of moderate amount of thick white secretions, eliminated with cued swallow; +NGT Thick white secretions adherent to posterior pharyngeal wall not cleared with cued cough, multiple swallows. Secretions eventually partially reduced with liquid wash; + cough productive of moderate amount of thick white secretions from the subglottic region, eliminated with cued swallow; +NGT

## 2018-08-21 NOTE — CONSULT NOTE ADULT - SUBJECTIVE AND OBJECTIVE BOX
CC: Called by Speech to assist in FEES    HPI:68y Female w/ a pmhx of asthma, anxiety/depression, HLD, UC, pHTN presents with weakness.  She was recently hospitalized here for a right hip fracture s/p repair last month.  Patient with infected right hip prosthesis s/p washout but failed to clear cultures so had source control by leisa. Last week she had severe sepsis with respiratory failure, requiring Intubation possible pneumonia and got a course of meropenem. She now has no uncontrolled infection apparent at present. Pt with Multiple intubation/Extubation, we are asked to evaluate the pt for Dysphonia, and assist with FEES per Speech Teams request.        PAST MEDICAL & SURGICAL HISTORY:  CVA (cerebral vascular accident)  Fatty pancreas  PNA (pneumonia)  Pulmonary HTN  IGT (impaired glucose tolerance)  Ulcerative colitis  Acid reflux  Anxiety  Depression  Mouth sores  HLD (hyperlipidemia)  Asthma  Humeral head fracture  H/O: hysterectomy  H/O cataract extraction, left  History of knee replacement    Allergies    ASA; dye contrast (Anaphylaxis)  aspirin (Short breath)  divalproex sodium (Other (Unknown))  Haldol (Other (Unknown))  penicillin (Short breath; Rash)  sulfa drugs (Short breath; Rash)  Xanax (Other (Unknown))    Intolerances      MEDICATIONS  (STANDING):  ALBUTerol/ipratropium for Nebulization 3 milliLiter(s) Nebulizer every 6 hours  ascorbic acid 500 milliGRAM(s) Oral daily  buDESOnide   0.5 milliGRAM(s) Respule 0.5 milliGRAM(s) Inhalation every 12 hours  chlorhexidine 4% Liquid 1 Application(s) Topical <User Schedule>  clonazePAM Tablet 2 milliGRAM(s) Oral at bedtime  enoxaparin Injectable 40 milliGRAM(s) SubCutaneous daily  famotidine    Tablet 20 milliGRAM(s) Oral daily  FIRST- Mouthwash  BLM 5 milliLiter(s) Swish and Spit daily  insulin lispro (HumaLOG) corrective regimen sliding scale   SubCutaneous every 6 hours  iron sucrose IVPB 100 milliGRAM(s) IV Intermittent every 24 hours  lidocaine   Patch 1 Patch Transdermal every 24 hours  melatonin 3 milliGRAM(s) Oral <User Schedule>  mirtazapine 45 milliGRAM(s) Oral <User Schedule>  multivitamin/minerals 1 Tablet(s) Oral daily  pantoprazole  Injectable 40 milliGRAM(s) IV Push once  predniSONE   Tablet 10 milliGRAM(s) Oral every 24 hours  QUEtiapine 50 milliGRAM(s) Oral <User Schedule>  sertraline 50 milliGRAM(s) Oral daily  sodium chloride 0.9%. 1000 milliLiter(s) (10 mL/Hr) IV Continuous <Continuous>    MEDICATIONS  (PRN):  acetaminophen    Suspension. 650 milliGRAM(s) Enteral Tube every 6 hours PRN Mild Pain (1 - 3)      Social History: denies etoh/tobacco/substance     Family history:non contributory     ROS:   ENT: all negative except as noted in HPI   CV: denies palpitations  Pulm: denies SOB, cough, hemoptysis  GI: denies change in apetite, indigestion, n/v  : denies pertinent urinary symptoms, urgency  Neuro: denies numbness/tingling, loss of sensation  Psych: denies anxiety  MS: denies muscle weakness, instability  Heme: denies easy bruising or bleeding  Endo: denies heat/cold intolerance, excessive sweating  Vascular: denies LE edema    Vital Signs Last 24 Hrs  T(C): 36.7 (21 Aug 2018 17:46), Max: 37.2 (20 Aug 2018 20:00)  T(F): 98 (21 Aug 2018 17:46), Max: 99 (20 Aug 2018 20:00)  HR: 102 (21 Aug 2018 17:46) (100 - 111)  BP: 144/80 (21 Aug 2018 17:46) (121/90 - 173/86)  BP(mean): 105 (21 Aug 2018 00:00) (101 - 115)  RR: 22 (21 Aug 2018 17:46) (22 - 33)  SpO2: 98% (21 Aug 2018 17:46) (96% - 98%)                          15.7   11.0  )-----------( 399      ( 21 Aug 2018 18:10 )             49.6    08-21    143  |  105  |  32<H>  ----------------------------<  184<H>  4.1   |  25  |  0.93    Ca    8.6      21 Aug 2018 17:46  Phos  3.1     08-21  Mg     1.8     08-21     PT/INR - ( 21 Aug 2018 17:46 )   PT: 11.8 sec;   INR: 1.09 ratio             PHYSICAL EXAM:  Gen: NAD  Skin: No rashes, bruises, or lesions  Head: Normocephalic, Atraumatic  Face: no edema, erythema, or fluctuance. Parotid glands soft without mass  Eyes: no scleral injection  Nose:  Left K-O feed in place, secured with Nasal Bridal   Mouth: No Stridor / Drooling / Trismus.  Mucosa moist, tongue/uvula midline, oropharynx clear  Neck: Flat, supple, no lymphadenopathy, trachea midline, no masses  Lymphatic: No lymphadenopathy  Resp: breathing easily, no stridor +dysphonia          Fiberoptic Indirect laryngoscopy:  (Scope #2 used)  Nasopharynx, oropharynx, and hypopharynx clear, no bleeding. Tongue base, Prominent posterior wall, vallecula, epiglottis, and subglottis appear normal. No erythema, edema, pooling of secretions, masses or lesions. Airway patent, no foreign body visualized.No glottic/supraglottic edema True vocal cords, arytenoids, vestibular folds, ventricles, pyriform sinuses, and aryepiglottic folds appear normal bilaterally.  Vocal cords mobile with good contact b/l. CC: dysphonia and called by Speech to assist in FEES    HPI:68y Female w/ a pmhx of asthma, anxiety/depression, HLD, UC, pHTN presents with weakness.  She was recently hospitalized here for a right hip fracture s/p repair last month.  Patient with infected right hip prosthesis s/p washout but failed to clear cultures so had source control by leisa. Last week she had severe sepsis with respiratory failure, requiring Intubation possible pneumonia and got a course of meropenem. She now has no uncontrolled infection apparent at present. Pt with Multiple intubation/Extubation, we are asked to evaluate the pt for Dysphonia, and assist with FEES to evaluate swallowing ability.      PAST MEDICAL & SURGICAL HISTORY:  CVA (cerebral vascular accident)  Fatty pancreas  PNA (pneumonia)  Pulmonary HTN  IGT (impaired glucose tolerance)  Ulcerative colitis  Acid reflux  Anxiety  Depression  Mouth sores  HLD (hyperlipidemia)  Asthma  Humeral head fracture  H/O: hysterectomy  H/O cataract extraction, left  History of knee replacement    Allergies    ASA; dye contrast (Anaphylaxis)  aspirin (Short breath)  divalproex sodium (Other (Unknown))  Haldol (Other (Unknown))  penicillin (Short breath; Rash)  sulfa drugs (Short breath; Rash)  Xanax (Other (Unknown))    Intolerances      MEDICATIONS  (STANDING):  ALBUTerol/ipratropium for Nebulization 3 milliLiter(s) Nebulizer every 6 hours  ascorbic acid 500 milliGRAM(s) Oral daily  buDESOnide   0.5 milliGRAM(s) Respule 0.5 milliGRAM(s) Inhalation every 12 hours  chlorhexidine 4% Liquid 1 Application(s) Topical <User Schedule>  clonazePAM Tablet 2 milliGRAM(s) Oral at bedtime  enoxaparin Injectable 40 milliGRAM(s) SubCutaneous daily  famotidine    Tablet 20 milliGRAM(s) Oral daily  FIRST- Mouthwash  BLM 5 milliLiter(s) Swish and Spit daily  insulin lispro (HumaLOG) corrective regimen sliding scale   SubCutaneous every 6 hours  iron sucrose IVPB 100 milliGRAM(s) IV Intermittent every 24 hours  lidocaine   Patch 1 Patch Transdermal every 24 hours  melatonin 3 milliGRAM(s) Oral <User Schedule>  mirtazapine 45 milliGRAM(s) Oral <User Schedule>  multivitamin/minerals 1 Tablet(s) Oral daily  pantoprazole  Injectable 40 milliGRAM(s) IV Push once  predniSONE   Tablet 10 milliGRAM(s) Oral every 24 hours  QUEtiapine 50 milliGRAM(s) Oral <User Schedule>  sertraline 50 milliGRAM(s) Oral daily  sodium chloride 0.9%. 1000 milliLiter(s) (10 mL/Hr) IV Continuous <Continuous>    MEDICATIONS  (PRN):  acetaminophen    Suspension. 650 milliGRAM(s) Enteral Tube every 6 hours PRN Mild Pain (1 - 3)      Social History: denies etoh/tobacco/substance     Family history:non contributory     ROS:   ENT: all negative except as noted in HPI   CV: denies palpitations  Pulm: denies SOB, cough, hemoptysis  GI: denies change in apetite, indigestion, n/v  : denies pertinent urinary symptoms, urgency  Neuro: denies numbness/tingling, loss of sensation  Psych: denies anxiety  MS: denies muscle weakness, instability  Heme: denies easy bruising or bleeding  Endo: denies heat/cold intolerance, excessive sweating  Vascular: denies LE edema    Vital Signs Last 24 Hrs  T(C): 36.7 (21 Aug 2018 17:46), Max: 37.2 (20 Aug 2018 20:00)  T(F): 98 (21 Aug 2018 17:46), Max: 99 (20 Aug 2018 20:00)  HR: 102 (21 Aug 2018 17:46) (100 - 111)  BP: 144/80 (21 Aug 2018 17:46) (121/90 - 173/86)  BP(mean): 105 (21 Aug 2018 00:00) (101 - 115)  RR: 22 (21 Aug 2018 17:46) (22 - 33)  SpO2: 98% (21 Aug 2018 17:46) (96% - 98%)                          15.7   11.0  )-----------( 399      ( 21 Aug 2018 18:10 )             49.6    08-21    143  |  105  |  32<H>  ----------------------------<  184<H>  4.1   |  25  |  0.93    Ca    8.6      21 Aug 2018 17:46  Phos  3.1     08-21  Mg     1.8     08-21     PT/INR - ( 21 Aug 2018 17:46 )   PT: 11.8 sec;   INR: 1.09 ratio             PHYSICAL EXAM:  Gen: NAD  Skin: No rashes, bruises, or lesions  Head: Normocephalic, Atraumatic  Face: no edema, erythema, or fluctuance. Parotid glands soft without mass  Eyes: no scleral injection  Nose:  Left K-O feed in place, secured with Nasal Bridal   Mouth: No Stridor / Drooling / Trismus.  Mucosa moist, tongue/uvula midline, oropharynx clear  Neck: Flat, supple, no lymphadenopathy, trachea midline, no masses  Lymphatic: No lymphadenopathy  Resp: breathing easily, no stridor +dysphonia          Fiberoptic Indirect laryngoscopy:  (Scope #2 used)  Nasopharynx, oropharynx, and hypopharynx clear, no bleeding. Tongue base, Prominent posterior wall, vallecula, epiglottis, and subglottis appear normal. No erythema, edema, pooling of secretions, masses or lesions. Airway patent, no foreign body visualized.No glottic/supraglottic edema True vocal cords, arytenoids, vestibular folds, ventricles, pyriform sinuses, and aryepiglottic folds appear normal bilaterally.  Vocal cords mobile with good contact b/l.

## 2018-08-21 NOTE — SWALLOW FEES ASSESSMENT ADULT - RECOMMENDED CONSISTENCY
Unable to make dietary recommendation 2/2 poor visualization. Would recommend MBS to objective assess the oropharyngeal swallow mechanism, r/o aspiration, and determine least restrictive diet. Unable to make dietary recommendation 2/2 poor visualization. Would recommend MBS to objectively assess the oropharyngeal swallow mechanism, r/o aspiration, and determine least restrictive diet.

## 2018-08-21 NOTE — PROGRESS NOTE ADULT - ASSESSMENT
68F s/p R hip PJI explant and placement of cement spacer with distal femur ORIF  Pain control  NWB LLE  FFWB RLE  PT/OT/OOB  Incentive spirometry  DVT ppx  Dispo planning

## 2018-08-21 NOTE — SWALLOW FEES ASSESSMENT ADULT - DIAGNOSTIC IMPRESSIONS
Evaluation limited 2/2 poor visualization due to pt movement, narrow pharynx, and presence of secretions. Pt with an oropharyngeal dysphagia characterized by poor bolus control, mild pharyngeal residue post swallow with honey thick liquid via teaspoon and thin puree texture . One instance of laryngeal penetration before the swallow. Aspiration cannot be fully r/o 2/2 poor visualization. Further trials were not attempted. Evaluation limited 2/2 poor visualization due to pt movement, narrow pharynx, and presence of secretions. Pt with an oropharyngeal dysphagia characterized by poor bolus control, mild pharyngeal residue post swallow with honey thick liquid via teaspoon and thin puree texture. One instance of laryngeal penetration before the swallow. Aspiration cannot be fully r/o 2/2 poor visualization. Further trials were not attempted.

## 2018-08-21 NOTE — PROGRESS NOTE ADULT - SUBJECTIVE AND OBJECTIVE BOX
Pt S/E at bedside, no acute events overnight, pain controlled. Pt transferred out of SICU to the floor yesterday. No complaints this morning. Continuing to improve.    Vital Signs Last 24 Hrs  T(C): 36.8 (21 Aug 2018 01:47), Max: 37.6 (20 Aug 2018 07:00)  T(F): 98.3 (21 Aug 2018 01:47), Max: 99.7 (20 Aug 2018 07:00)  HR: 100 (21 Aug 2018 01:47) (96 - 111)  BP: 134/75 (21 Aug 2018 01:47) (121/90 - 173/86)  BP(mean): 105 (21 Aug 2018 00:00) (91 - 133)  RR: 22 (21 Aug 2018 01:47) (19 - 35)  SpO2: 97% (21 Aug 2018 01:47) (95% - 100%)    Gen: NAD    Right Lower Extremity:  Dressing clean dry intact  +EHL/FHL/TA/GS  SILT L3-S1  +DP/PT Pulses  Compartments soft  No calf TTP B/L

## 2018-08-21 NOTE — SWALLOW FEES ASSESSMENT ADULT - THE ABOVE FINDINGS WERE DISCUSSED WITH
PA Ch/Family/Nursing/Patient Patient/Nursing/Family/Physician/MD Geovany; pt's spouse; pt's daughter

## 2018-08-21 NOTE — PROGRESS NOTE ADULT - SUBJECTIVE AND OBJECTIVE BOX
---___---___---___---___---___---___ ---___---___---___---___---___---___---___---___---___---                    <<<  M E D I C A L   A T T E N D I N G    F O L L O W    U P   N O T E  >>>    need speech eval awaiting results    ---___---___---___---___---___---      <<<  MEDICATIONS:  >>>    MEDICATIONS  (STANDING):  ALBUTerol/ipratropium for Nebulization 3 milliLiter(s) Nebulizer every 6 hours  ascorbic acid 500 milliGRAM(s) Oral daily  buDESOnide   0.5 milliGRAM(s) Respule 0.5 milliGRAM(s) Inhalation every 12 hours  chlorhexidine 4% Liquid 1 Application(s) Topical <User Schedule>  clonazePAM Tablet 2 milliGRAM(s) Oral at bedtime  enoxaparin Injectable 40 milliGRAM(s) SubCutaneous daily  famotidine    Tablet 20 milliGRAM(s) Oral daily  FIRST- Mouthwash  BLM 5 milliLiter(s) Swish and Spit daily  insulin lispro (HumaLOG) corrective regimen sliding scale   SubCutaneous every 6 hours  iron sucrose IVPB 100 milliGRAM(s) IV Intermittent every 24 hours  lidocaine   Patch 1 Patch Transdermal every 24 hours  melatonin 3 milliGRAM(s) Oral <User Schedule>  mirtazapine 45 milliGRAM(s) Oral <User Schedule>  multivitamin/minerals 1 Tablet(s) Oral daily  predniSONE   Tablet 10 milliGRAM(s) Oral every 24 hours  QUEtiapine 50 milliGRAM(s) Oral <User Schedule>  sertraline 50 milliGRAM(s) Oral daily  sodium chloride 0.9%. 1000 milliLiter(s) (10 mL/Hr) IV Continuous <Continuous>  vancomycin  IVPB 750 milliGRAM(s) IV Intermittent every 24 hours      MEDICATIONS  (PRN):  acetaminophen    Suspension. 650 milliGRAM(s) Enteral Tube every 6 hours PRN Mild Pain (1 - 3)       ---___---___---___---___---___---     <<<REVIEW OF SYSTEM: >>>    GEN: no fever, no chills, no pain  RESP: no SOB, no cough, no sputum  CVS: no chest pain, no palpitations, no edema  GI: no abdominal pain, no nausea, no vomiting, no constipation, no diarrhea  : no dysurea, no frequency  NEURO: no headache, no dizziness  PSYCH: no depression, not anxious  Derm : no itching, no rash     ---___---___---___---___---___---          <<<  VITAL SIGNS: >>>    T(F): 98.6 (08-21-18 @ 10:46), Max: 99 (08-20-18 @ 15:00)  HR: 104 (08-21-18 @ 10:46) (100 - 111)  BP: 158/84 (08-21-18 @ 10:46) (121/90 - 173/86)  RR: 25 (08-21-18 @ 10:46) (22 - 35)  SpO2: 97% (08-21-18 @ 10:46) (96% - 100%)  Wt(kg): --  CAPILLARY BLOOD GLUCOSE      POCT Blood Glucose.: 116 mg/dL (21 Aug 2018 12:19)    I&O's Summary    20 Aug 2018 07:01  -  21 Aug 2018 07:00  --------------------------------------------------------  IN: 1020 mL / OUT: 235 mL / NET: 785 mL         ---___---___---___---___---___---                       PHYSICAL EXAM:    GEN: A&O X 3 , NAD , comfortable  HEENT: NCAT, PERRL, MMM, no scleral icterus, hearing intact  NECK: Supple, No JVD  CVS: S1S2 , regular , No M/R/G appreciated  PULM: CTA B/L,  no W/R/R appreciated  ABD.: soft. non tender, non distended,  bowel sounds present  Extrem: intact pulses , no edema noted  Derm: No rash or ecchymosis noted  PSYCH: normal mood, no depression,  anxious     ---___---___---___---___---___---     <<<  LAB AND IMAGING: >>>                          9.0    11.6  )-----------( 443      ( 21 Aug 2018 10:38 )             28.2               08-21    142  |  104  |  33<H>  ----------------------------<  113<H>  3.5   |  27  |  0.96    Ca    8.4      21 Aug 2018 08:45  Phos  3.1     08-21  Mg     1.8     08-21                                 [All pertinent / recent available Imaging reports and other labs reviewed]     ---___---___---___---___---___---___ ---___---___---___---___---           <<<  A S S E S S M E N T   A N YE   P L A N :  >>>          -GI/DVT Prophylaxis.    --------------------------------------------  Case discussed with   Education given on     >>______________________<<      Deniz Bolden .         phone   [phone]

## 2018-08-21 NOTE — PROGRESS NOTE ADULT - SUBJECTIVE AND OBJECTIVE BOX
PULMONARY PROGRESS NOTE    MYRIAM HEATH  MRN-[id number]    Patient is a 68y old  Female who presents with a chief complaint of weakness (26 Jul 2018 16:54)      HPI:  on room air, in bed, awake,  at bedside, ngt in place, no complaints.    MEDICATIONS  (STANDING):  ALBUTerol/ipratropium for Nebulization 3 milliLiter(s) Nebulizer every 6 hours  ascorbic acid 500 milliGRAM(s) Oral daily  buDESOnide   0.5 milliGRAM(s) Respule 0.5 milliGRAM(s) Inhalation every 12 hours  chlorhexidine 4% Liquid 1 Application(s) Topical <User Schedule>  clonazePAM Tablet 2 milliGRAM(s) Oral at bedtime  enoxaparin Injectable 40 milliGRAM(s) SubCutaneous daily  famotidine    Tablet 20 milliGRAM(s) Oral daily  FIRST- Mouthwash  BLM 5 milliLiter(s) Swish and Spit daily  insulin lispro (HumaLOG) corrective regimen sliding scale   SubCutaneous every 6 hours  iron sucrose IVPB 100 milliGRAM(s) IV Intermittent every 24 hours  lidocaine   Patch 1 Patch Transdermal every 24 hours  melatonin 3 milliGRAM(s) Oral <User Schedule>  mirtazapine 45 milliGRAM(s) Oral <User Schedule>  multivitamin/minerals 1 Tablet(s) Oral daily  predniSONE   Tablet 10 milliGRAM(s) Oral every 24 hours  QUEtiapine 50 milliGRAM(s) Oral <User Schedule>  sertraline 50 milliGRAM(s) Oral daily  sodium chloride 0.9%. 1000 milliLiter(s) (10 mL/Hr) IV Continuous <Continuous>  vancomycin  IVPB 750 milliGRAM(s) IV Intermittent every 24 hours    MEDICATIONS  (PRN):  acetaminophen    Suspension. 650 milliGRAM(s) Enteral Tube every 6 hours PRN Mild Pain (1 - 3)          EXAM:  Vital Signs Last 24 Hrs  T(C): 37 (21 Aug 2018 10:46), Max: 37.2 (20 Aug 2018 15:00)  T(F): 98.6 (21 Aug 2018 10:46), Max: 99 (20 Aug 2018 15:00)  HR: 104 (21 Aug 2018 10:46) (100 - 111)  BP: 158/84 (21 Aug 2018 10:46) (121/90 - 173/86)  BP(mean): 105 (21 Aug 2018 00:00) (101 - 133)  RR: 25 (21 Aug 2018 10:46) (22 - 35)  SpO2: 97% (21 Aug 2018 10:46) (96% - 100%)      GENERAL: The patient is awake and alert in no apparent distress.     LUNGS: clear anteriorly      LABS/IMAGING: reviewed                                   9.0    11.6  )-----------( 443      ( 21 Aug 2018 10:38 )             28.2   08-21    142  |  104  |  33<H>  ----------------------------<  113<H>  3.5   |  27  |  0.96    Ca    8.4      21 Aug 2018 08:45  Phos  3.1     08-21  Mg     1.8     08-21    17        < from: Xray Chest 1 View- PORTABLE-Routine (08.21.18 @ 09:58) >  IMPRESSION:     The left-sided PICC is in place with its tip in the azygos vein,   recommend adjustment.    Unchanged hazy opacities of the right lung.    Dr. Mckinnon discussed these findings with Dr. Armenta on 8/21/2018 12:00 PM,   with read back.    < end of copied text >          PROBLEM LIST:  68y Female with HEALTH ISSUES - PROBLEM Dx:  Fever, unspecified fever cause: Fever, unspecified fever cause  Anxiety: Anxiety  IGT (impaired glucose tolerance): IGT (impaired glucose tolerance)  Metabolic acidosis, NAG, bicarbonate losses: Metabolic acidosis, NAG, bicarbonate losses  Hypokalemia: Hypokalemia  Sepsis due to methicillin susceptible Staphylococcus aureus: Sepsis due to methicillin susceptible Staphylococcus aureus  Hyponatremia: Hyponatremia  Pelvic fracture: Pelvic fracture  Asthma: Asthma  Electrolyte abnormality: Electrolyte abnormality  Troponin level elevated: Troponin level elevated  Hypoglycemia: Hypoglycemia  Metabolic acidosis with increased anion gap and accumulation of organic acids: Metabolic acidosis with increased anion gap and accumulation of organic acids  Urinary frequency: Urinary frequency  Toxic metabolic encephalopathy: Toxic metabolic encephalopathy  Acute back pain with sciatica, left: Acute back pain with sciatica, left  Severe sepsis: Severe sepsis  Weakness of left lower extremity: Weakness of left lower extremity      RECS:  -pulmicort and duoneb, for asthma  -incentive neelima, deep breathing exercises  -ID/surgery follow up        Luciana Mancini MD   [phone]

## 2018-08-21 NOTE — CHART NOTE - NSCHARTNOTEFT_GEN_A_CORE
Source: Patient [ ]    Family [ ]     other [x ]  RN and SLP.   Patient seen for routine follow up.  At visit, SLP therapist was present in room.  As per SLP, the FEEST eval was inconclusive and patient needs a MBS to establish if safe to initiate po diet.  Patient has been maintained on tube feeds since admit on July 26th and has been tolerating well.  Pivot 1.5 @ 30cc/hr x 24 hours is a goal rate and patient is meeting 100% of needs.  Dxd with Sepsis, MRSA bacteremia 2/2 infected right hemiarthroplasty hardware.  Diet : Pivot 1.5 @30 cc/hr x 24 hours via nasogastric tube.      Patient reports [ ] nausea  [ ] vomiting [ ] diarrhea [ ] constipation  [ ]chewing problems [ ] swallowing issues  [ ] other:      PO intake:  < 50% [ ] 50-75% [ ]   % [ ]  other :     Source for PO intake [ ] Patient [ ] family [ ] chart [ ] staff [ ] other     Enteral /Parenteral Nutrition: 720 mls of formula provides 1080 calories/day, 68 grams of protein/day. 546 mls of free water and provides 21 kcals/kg and 1.7 grams prokg of dosing weight 50.4 kgs.   24 hour EN provides 720 mls of formula.       Current Weight:   % Weight Change    Pertinent Medications: MEDICATIONS  (STANDING):  ALBUTerol/ipratropium for Nebulization 3 milliLiter(s) Nebulizer every 6 hours  ascorbic acid 500 milliGRAM(s) Oral daily  buDESOnide   0.5 milliGRAM(s) Respule 0.5 milliGRAM(s) Inhalation every 12 hours  chlorhexidine 4% Liquid 1 Application(s) Topical <User Schedule>  clonazePAM Tablet 2 milliGRAM(s) Oral at bedtime  enoxaparin Injectable 40 milliGRAM(s) SubCutaneous daily  famotidine    Tablet 20 milliGRAM(s) Oral daily  FIRST- Mouthwash  BLM 5 milliLiter(s) Swish and Spit daily  insulin lispro (HumaLOG) corrective regimen sliding scale   SubCutaneous every 6 hours  iron sucrose IVPB 100 milliGRAM(s) IV Intermittent every 24 hours  lidocaine   Patch 1 Patch Transdermal every 24 hours  melatonin 3 milliGRAM(s) Oral <User Schedule>  mirtazapine 45 milliGRAM(s) Oral <User Schedule>  multivitamin/minerals 1 Tablet(s) Oral daily  predniSONE   Tablet 10 milliGRAM(s) Oral every 24 hours  QUEtiapine 50 milliGRAM(s) Oral <User Schedule>  sertraline 50 milliGRAM(s) Oral daily  sodium chloride 0.9%. 1000 milliLiter(s) (10 mL/Hr) IV Continuous <Continuous>    MEDICATIONS  (PRN):  acetaminophen    Suspension. 650 milliGRAM(s) Enteral Tube every 6 hours PRN Mild Pain (1 - 3)    Pertinent Labs:  08-21 Na142 mmol/L Glu 113 mg/dL<H> K+ 3.5 mmol/L Cr  0.96 mg/dL BUN 33 mg/dL<H> 08-21 Phos 3.1 mg/dL 07-27 [id number] 5.5 %      Skin:     Estimated Needs:   [ ] no change since previous assessment  [ ] recalculated:       Previous Nutrition Diagnosis:     [ ] Inadequate Energy Intake [ ]Inadequate Oral Intake [ ] Excessive Energy Intake     [ ] Underweight [ ] Increased Nutrient Needs [ ] Overweight/Obesity     [ ] Altered GI Function [ ] Unintended Weight Loss [ ] Food & Nutrition Related Knowledge Deficit [ ] Malnutrition          Nutrition Diagnosis is [ ] ongoing  [ ] resolved [ ] not applicable          New Nutrition Diagnosis: [ ] not applicable    [ ] Inadequate Protein Energy Intake [ ]Inadequate Oral Intake [ ] Excessive Energy Intake     [ ] Underweight [ ] Increased Nutrient Needs [ ] Overweight/Obesity     [ ] Altered GI Function [ ] Unintended Weight Loss [ ] Food & Nutrition Related Knowledge Deficit[ ] Limited Adherence to nutrition related recommendations [ ] Malnutrition  [ ] other: Free text       Related to:      As evidenced by:      Interventions:     Recommend    [ ] Change Diet To:    [ ] Nutrition Supplement    [ ] Nutrition Support    [ ] Other:        Monitoring and Evaluation:     [ ] PO intake [ ] Tolerance to diet prescription [ ] weights [ ] follow up per protocol    [ ] other: Source: Patient [ ]    Family [ ]     other [x ]  RN and SLP.   Patient seen for routine follow up.  At visit, SLP therapist was present in room.  As per SLP, the FEEST eval was inconclusive and patient needs a MBS to establish if safe to initiate po diet.  Patient has been maintained on tube feeds since admit on July 26th and has been tolerating well.  Pivot 1.5 @ 30cc/hr x 24 hours is a goal rate and patient is meeting 100% of needs.  Dxd with Sepsis, MRSA bacteremia 2/2 infected right hemiarthroplasty hardware, pelvic fracture, anxiety.  Diet : Pivot 1.5 @30 cc/hr x 24 hours via nasogastric tube.      Patient reports [ ] nausea  [ ] vomiting [ ] diarrhea [ ] constipation  [ ]chewing problems [ ] swallowing issues  [ ] other:      PO intake:  < 50% [ ] 50-75% [ ]   % [ ]  other :     Source for PO intake [ ] Patient [ ] family [ ] chart [ ] staff [ ] other     Enteral /Parenteral Nutrition: 720 mls of formula provides 1080 calories/day, 68 grams of protein/day. 546 mls of free water and provides 21 kcals/kg and 1.7 grams pro/kg of dosing weight 50.4 kgs.   24 hour EN provides 720 mls of formula.       Current Weight: Current weight 102.5 pounds- 08/20, 107 pounds- 08/19, 119 pounds- 08/18  % Weight Change  Difficult to assess weight changes with bed scale and ? accuracy.     Pertinent Medications: MEDICATIONS  (STANDING):  ALBUTerol/ipratropium for Nebulization 3 milliLiter(s) Nebulizer every 6 hours  ascorbic acid 500 milliGRAM(s) Oral daily  buDESOnide   0.5 milliGRAM(s) Respule 0.5 milliGRAM(s) Inhalation every 12 hours  chlorhexidine 4% Liquid 1 Application(s) Topical <User Schedule>  clonazePAM Tablet 2 milliGRAM(s) Oral at bedtime  enoxaparin Injectable 40 milliGRAM(s) SubCutaneous daily  famotidine    Tablet 20 milliGRAM(s) Oral daily  FIRST- Mouthwash  BLM 5 milliLiter(s) Swish and Spit daily  insulin lispro (HumaLOG) corrective regimen sliding scale   SubCutaneous every 6 hours  iron sucrose IVPB 100 milliGRAM(s) IV Intermittent every 24 hours  lidocaine   Patch 1 Patch Transdermal every 24 hours  melatonin 3 milliGRAM(s) Oral <User Schedule>  mirtazapine 45 milliGRAM(s) Oral <User Schedule>  multivitamin/minerals 1 Tablet(s) Oral daily  predniSONE   Tablet 10 milliGRAM(s) Oral every 24 hours  QUEtiapine 50 milliGRAM(s) Oral <User Schedule>  sertraline 50 milliGRAM(s) Oral daily  sodium chloride 0.9%. 1000 milliLiter(s) (10 mL/Hr) IV Continuous <Continuous>    MEDICATIONS  (PRN):  acetaminophen    Suspension. 650 milliGRAM(s) Enteral Tube every 6 hours PRN Mild Pain (1 - 3)    Pertinent Labs:  08-21 Na142 mmol/L Glu 113 mg/dL<H> K+ 3.5 mmol/L Cr  0.96 mg/dL BUN 33 mg/dL<H> 08-21 Phos 3.1 mg/dL 07-27 [id number] 5.5 %      Skin: intact    Estimated Needs:   [x ] no change since previous assessment  [ ] recalculated:       Previous Nutrition Diagnosis:     [ ] Inadequate Energy Intake [ ]Inadequate Oral Intake [ ] Excessive Energy Intake     [ ] Underweight [x ] Increased Nutrient Needs [ ] Overweight/Obesity     [ ] Altered GI Function [ ] Unintended Weight Loss [ ] Food & Nutrition Related Knowledge Deficit [ ] Malnutrition          Nutrition Diagnosis is [ x] ongoing  [ ] resolved [ ] not applicable - addressed with EN.  Awaiting SLP eval and MBS vs. PEG placement per MD note.         New Nutrition Diagnosis: [ ] not applicable    [ ] Inadequate Protein Energy Intake [ ]Inadequate Oral Intake [ ] Excessive Energy Intake     [ ] Underweight [ ] Increased Nutrient Needs [ ] Overweight/Obesity     [ ] Altered GI Function [ ] Unintended Weight Loss [ ] Food & Nutrition Related Knowledge Deficit[ ] Limited Adherence to nutrition related recommendations [ ] Malnutrition  [ ] other: Free text       Related to:      As evidenced by:      Interventions:     Recommend    [ ] Change Diet To:    [ ] Nutrition Supplement    [ ] Nutrition Support    [x ] Other: Continue current nutritional plan for stability       Monitoring and Evaluation:     [ ] PO intake [x ] Tolerance to diet prescription [x ] weights [x ] follow up per protocol    [ x] other: Please rewigh for accuracy on zeroed out bed scale. Source: Patient [ ]    Family [ ]     other [x ]  RN and SLP.   Patient seen for routine follow up.  At visit, SLP therapist was present in room.  As per SLP, the FEEST eval was inconclusive and patient needs a MBS to establish if safe to initiate po diet.  Patient has been maintained on tube feeds since admit on July 26th and has been tolerating well.  Pivot 1.5 @ 30cc/hr x 24 hours is a goal rate and patient is meeting 100% of needs.  Dxd with Sepsis, MRSA bacteremia 2/2 infected right hemiarthroplasty hardware, pelvic fracture, anxiety.  Diet : Pivot 1.5 @30 cc/hr x 24 hours via nasogastric tube.      Patient reports [ ] nausea  [ ] vomiting [ ] diarrhea [ ] constipation  [ ]chewing problems [ ] swallowing issues  [ ] other:      PO intake:  < 50% [ ] 50-75% [ ]   % [ ]  other :     Source for PO intake [ ] Patient [ ] family [ ] chart [ ] staff [ ] other     Enteral /Parenteral Nutrition: 720 mls of formula provides 1080 calories/day, 68 grams of protein/day. 546 mls of free water and provides 21 kcals/kg and 1.7 grams pro/kg of dosing weight 50.4 kgs.   24 hour EN provides 720 mls of formula.       Current Weight: Current weight 102.5 pounds- 08/20, 107 pounds- 08/19, 119 pounds- 08/18  % Weight Change  Difficult to assess weight changes with bed scale and ? accuracy.     Pertinent Medications: MEDICATIONS  (STANDING):  ALBUTerol/ipratropium for Nebulization 3 milliLiter(s) Nebulizer every 6 hours  ascorbic acid 500 milliGRAM(s) Oral daily  buDESOnide   0.5 milliGRAM(s) Respule 0.5 milliGRAM(s) Inhalation every 12 hours  chlorhexidine 4% Liquid 1 Application(s) Topical <User Schedule>  clonazePAM Tablet 2 milliGRAM(s) Oral at bedtime  enoxaparin Injectable 40 milliGRAM(s) SubCutaneous daily  famotidine    Tablet 20 milliGRAM(s) Oral daily  FIRST- Mouthwash  BLM 5 milliLiter(s) Swish and Spit daily  insulin lispro (HumaLOG) corrective regimen sliding scale   SubCutaneous every 6 hours  iron sucrose IVPB 100 milliGRAM(s) IV Intermittent every 24 hours  lidocaine   Patch 1 Patch Transdermal every 24 hours  melatonin 3 milliGRAM(s) Oral <User Schedule>  mirtazapine 45 milliGRAM(s) Oral <User Schedule>  multivitamin/minerals 1 Tablet(s) Oral daily  predniSONE   Tablet 10 milliGRAM(s) Oral every 24 hours  QUEtiapine 50 milliGRAM(s) Oral <User Schedule>  sertraline 50 milliGRAM(s) Oral daily  sodium chloride 0.9%. 1000 milliLiter(s) (10 mL/Hr) IV Continuous <Continuous>    MEDICATIONS  (PRN):  acetaminophen    Suspension. 650 milliGRAM(s) Enteral Tube every 6 hours PRN Mild Pain (1 - 3)    Pertinent Labs:  08-21 Na142 mmol/L Glu 113 mg/dL<H> K+ 3.5 mmol/L Cr  0.96 mg/dL BUN 33 mg/dL<H> 08-21 Phos 3.1 mg/dL 07-27 [id number] 5.5 %      Skin: DTI b/l heels    Estimated Needs:   [x ] no change since previous assessment  [ ] recalculated:       Previous Nutrition Diagnosis:     [ ] Inadequate Energy Intake [ ]Inadequate Oral Intake [ ] Excessive Energy Intake     [ ] Underweight [x ] Increased Nutrient Needs [ ] Overweight/Obesity     [ ] Altered GI Function [ ] Unintended Weight Loss [ ] Food & Nutrition Related Knowledge Deficit [ ] Malnutrition          Nutrition Diagnosis is [ x] ongoing  [ ] resolved [ ] not applicable - addressed with EN.  Awaiting SLP eval and MBS vs. PEG placement per MD note.         New Nutrition Diagnosis: [ ] not applicable    [ ] Inadequate Protein Energy Intake [ ]Inadequate Oral Intake [ ] Excessive Energy Intake     [ ] Underweight [ ] Increased Nutrient Needs [ ] Overweight/Obesity     [ ] Altered GI Function [ ] Unintended Weight Loss [ ] Food & Nutrition Related Knowledge Deficit[ ] Limited Adherence to nutrition related recommendations [ ] Malnutrition  [ ] other: Free text       Related to:      As evidenced by:      Interventions:     Recommend    [ ] Change Diet To:    [ ] Nutrition Supplement    [ ] Nutrition Support    [x ] Other: Continue current nutritional plan for stability       Monitoring and Evaluation:     [ ] PO intake [x ] Tolerance to diet prescription [x ] weights [x ] follow up per protocol    [ x] other: Please reweigh for accuracy on zeroed out bed scale.  Monitor labs, weights, BMs and GI tolerance.  Continue Micronutrient supplementation.   RDN remains available.

## 2018-08-21 NOTE — CONSULT NOTE ADULT - ATTENDING COMMENTS
Pt is septic, with no other source for sepsis other than R hip hematoma. MRSA + BCx x 4. Plan for I+D and head exchange now for acute infection control with the possibility ~50% of needing to do a full 2 staged revision. Also understand the possibility of needing life-long abx. Pt and family in full understanding. All RBAs of I+D and head exchange discussed. All questions answered. Informed consent obtained. Plan for possible CRPP L acetablum if stable and infection free vs NOM for L acetabluar fx with FFWB x 3mo. Family also understanding of this.
Agree with above. Ortho for hip/infection. Spine for L2 fracture. Pain control and pulmonary toilet for sternal fracture.
Pt seen and examined.  Chart reviewed.  Resident note confirmed.  Pt is a 68 year old female with a medical history significant for CVA/dementia/pulm HTN/asthma/UC/recent right hip fracture who presents to [facility] with changes consistent with sepsis.  She reports weakness, diaphoresis, altered mental status, and increased urinary frequency at home.  In the ED, her WBC was elevated to 21.  CT was significant for a right lateral thigh collection adjacent to hip prosthesis. Pt was admitted to the floor and became hypotensive overnight.  Hypotension resolved without intervention.  Pt was admitted to the SICU for monitoring.    PMH/PSH/MEDS/ALL/SH/FH/ROS:  Unchanged from H&P above  Vitals/PE/Labs/Radiographic data:  Reviewed     A/P  Neuro:  L2 compression fracture  	Dementia  	Continue q1 hour neuro checks  	MRI pending  	Spine surgery follow up    CVS:	S/p hypotension  	Elevated troponin  	Trend troponin  	Echocardiogram  	Cardiology consult  	Monitor vitals    Pulm:  	Atelectasis  	ISP    GI:	No active issues  	NPO for possible OR    :  	CKD 3  	Metabolic acidosis  	Hyponatremia  	Hypokalemia  	Monitor and replace lytes  	Monitor I’s and O’s    Heme:	Anemia  	Mild coagulopathy  	Monitor H/h    ID: 	R/o infected hip prosthesis  	Continue Abx  	F/u Cultures     Endo:	Hypoglycemia  	D50 given  	Continue q1 hour finger sticks.    Proph:  Start DVT proph
I have seen this patient with the fellow and agree with their assessment and plan.    Brady Hernandez MD  Cell   Pager   Office     For weekend coverage, call Dr Louise Last( Fellow) and Dr. Leodan Land( attending)
normal laryngoscopy. diet per speech.

## 2018-08-21 NOTE — SWALLOW FEES ASSESSMENT ADULT - RESIDUE IN VALLECULAE
Trace/with honey thick liquid; mild with thin puree, ? increasing to moderate amount - visualization poor

## 2018-08-22 LAB
ANION GAP SERPL CALC-SCNC: 13 MMOL/L — SIGNIFICANT CHANGE UP (ref 5–17)
BUN SERPL-MCNC: 31 MG/DL — HIGH (ref 7–23)
CALCIUM SERPL-MCNC: 8.6 MG/DL — SIGNIFICANT CHANGE UP (ref 8.4–10.5)
CHLORIDE SERPL-SCNC: 102 MMOL/L — SIGNIFICANT CHANGE UP (ref 96–108)
CO2 SERPL-SCNC: 23 MMOL/L — SIGNIFICANT CHANGE UP (ref 22–31)
CREAT SERPL-MCNC: 0.95 MG/DL — SIGNIFICANT CHANGE UP (ref 0.5–1.3)
GLUCOSE SERPL-MCNC: 130 MG/DL — HIGH (ref 70–99)
HCT VFR BLD CALC: 32.2 % — LOW (ref 34.5–45)
HGB BLD-MCNC: 10.1 G/DL — LOW (ref 11.5–15.5)
MCHC RBC-ENTMCNC: 28.8 PG — SIGNIFICANT CHANGE UP (ref 27–34)
MCHC RBC-ENTMCNC: 31.5 GM/DL — LOW (ref 32–36)
MCV RBC AUTO: 91.3 FL — SIGNIFICANT CHANGE UP (ref 80–100)
PLATELET # BLD AUTO: 508 K/UL — HIGH (ref 150–400)
POTASSIUM SERPL-MCNC: 3.7 MMOL/L — SIGNIFICANT CHANGE UP (ref 3.5–5.3)
POTASSIUM SERPL-SCNC: 3.7 MMOL/L — SIGNIFICANT CHANGE UP (ref 3.5–5.3)
RBC # BLD: 3.53 M/UL — LOW (ref 3.8–5.2)
RBC # FLD: 14.1 % — SIGNIFICANT CHANGE UP (ref 10.3–14.5)
SODIUM SERPL-SCNC: 138 MMOL/L — SIGNIFICANT CHANGE UP (ref 135–145)
TROPONIN T, HIGH SENSITIVITY RESULT: 192 NG/L — HIGH (ref 0–51)
WBC # BLD: 13.3 K/UL — HIGH (ref 3.8–10.5)
WBC # FLD AUTO: 13.3 K/UL — HIGH (ref 3.8–10.5)

## 2018-08-22 PROCEDURE — 36584 COMPL RPLCMT PICC RS&I: CPT

## 2018-08-22 PROCEDURE — 77001 FLUOROGUIDE FOR VEIN DEVICE: CPT | Mod: 26

## 2018-08-22 PROCEDURE — 74230 X-RAY XM SWLNG FUNCJ C+: CPT | Mod: 26

## 2018-08-22 RX ORDER — SODIUM CHLORIDE 9 MG/ML
10 INJECTION INTRAMUSCULAR; INTRAVENOUS; SUBCUTANEOUS EVERY 12 HOURS
Qty: 0 | Refills: 0 | Status: DISCONTINUED | OUTPATIENT
Start: 2018-08-22 | End: 2018-08-28

## 2018-08-22 RX ORDER — NYSTATIN CREAM 100000 [USP'U]/G
1 CREAM TOPICAL
Qty: 0 | Refills: 0 | Status: DISCONTINUED | OUTPATIENT
Start: 2018-08-22 | End: 2018-10-04

## 2018-08-22 RX ORDER — SODIUM CHLORIDE 9 MG/ML
20 INJECTION INTRAMUSCULAR; INTRAVENOUS; SUBCUTANEOUS ONCE
Qty: 0 | Refills: 0 | Status: DISCONTINUED | OUTPATIENT
Start: 2018-08-22 | End: 2018-08-28

## 2018-08-22 RX ORDER — MORPHINE SULFATE 50 MG/1
1 CAPSULE, EXTENDED RELEASE ORAL ONCE
Qty: 0 | Refills: 0 | Status: DISCONTINUED | OUTPATIENT
Start: 2018-08-22 | End: 2018-08-22

## 2018-08-22 RX ORDER — SODIUM CHLORIDE 9 MG/ML
10 INJECTION INTRAMUSCULAR; INTRAVENOUS; SUBCUTANEOUS
Qty: 0 | Refills: 0 | Status: DISCONTINUED | OUTPATIENT
Start: 2018-08-22 | End: 2018-08-28

## 2018-08-22 RX ADMIN — Medication 0.5 MILLIGRAM(S): at 01:33

## 2018-08-22 RX ADMIN — Medication 3 MILLILITER(S): at 01:36

## 2018-08-22 RX ADMIN — Medication 650 MILLIGRAM(S): at 20:20

## 2018-08-22 RX ADMIN — Medication 3 MILLILITER(S): at 06:44

## 2018-08-22 RX ADMIN — SERTRALINE 50 MILLIGRAM(S): 25 TABLET, FILM COATED ORAL at 14:23

## 2018-08-22 RX ADMIN — Medication 10 MILLIGRAM(S): at 14:13

## 2018-08-22 RX ADMIN — Medication 3 MILLIGRAM(S): at 22:17

## 2018-08-22 RX ADMIN — Medication 1 TABLET(S): at 14:22

## 2018-08-22 RX ADMIN — LIDOCAINE 1 PATCH: 4 CREAM TOPICAL at 14:24

## 2018-08-22 RX ADMIN — MORPHINE SULFATE 1 MILLIGRAM(S): 50 CAPSULE, EXTENDED RELEASE ORAL at 21:20

## 2018-08-22 RX ADMIN — Medication 3 MILLILITER(S): at 14:14

## 2018-08-22 RX ADMIN — LIDOCAINE 1 PATCH: 4 CREAM TOPICAL at 02:08

## 2018-08-22 RX ADMIN — IRON SUCROSE 210 MILLIGRAM(S): 20 INJECTION, SOLUTION INTRAVENOUS at 19:44

## 2018-08-22 RX ADMIN — Medication 500 MILLIGRAM(S): at 14:17

## 2018-08-22 RX ADMIN — Medication 650 MILLIGRAM(S): at 14:13

## 2018-08-22 RX ADMIN — Medication 650 MILLIGRAM(S): at 19:41

## 2018-08-22 RX ADMIN — FAMOTIDINE 20 MILLIGRAM(S): 10 INJECTION INTRAVENOUS at 14:23

## 2018-08-22 RX ADMIN — CHLORHEXIDINE GLUCONATE 1 APPLICATION(S): 213 SOLUTION TOPICAL at 06:44

## 2018-08-22 RX ADMIN — Medication 2: at 17:44

## 2018-08-22 RX ADMIN — Medication 2 MILLIGRAM(S): at 22:18

## 2018-08-22 RX ADMIN — Medication 0.5 MILLIGRAM(S): at 22:16

## 2018-08-22 RX ADMIN — DIPHENHYDRAMINE HYDROCHLORIDE AND LIDOCAINE HYDROCHLORIDE AND ALUMINUM HYDROXIDE AND MAGNESIUM HYDRO 5 MILLILITER(S): KIT at 14:24

## 2018-08-22 RX ADMIN — ENOXAPARIN SODIUM 40 MILLIGRAM(S): 100 INJECTION SUBCUTANEOUS at 14:23

## 2018-08-22 RX ADMIN — Medication 650 MILLIGRAM(S): at 15:00

## 2018-08-22 RX ADMIN — MIRTAZAPINE 45 MILLIGRAM(S): 45 TABLET, ORALLY DISINTEGRATING ORAL at 22:18

## 2018-08-22 RX ADMIN — QUETIAPINE FUMARATE 50 MILLIGRAM(S): 200 TABLET, FILM COATED ORAL at 22:18

## 2018-08-22 RX ADMIN — MORPHINE SULFATE 1 MILLIGRAM(S): 50 CAPSULE, EXTENDED RELEASE ORAL at 21:04

## 2018-08-22 RX ADMIN — Medication 3 MILLILITER(S): at 19:42

## 2018-08-22 RX ADMIN — SODIUM CHLORIDE 10 MILLILITER(S): 9 INJECTION INTRAMUSCULAR; INTRAVENOUS; SUBCUTANEOUS at 19:44

## 2018-08-22 RX ADMIN — Medication 0.5 MILLIGRAM(S): at 14:14

## 2018-08-22 NOTE — SWALLOW VFSS/MBS ASSESSMENT ADULT - COMMENTS
Per SICU note 8/19:   In the ED, patient was hemodynamically stable but noted to be hypoglycemic. She was also started on meropenem & vancomycin for concern of sepsis. CT scan revealed a 17 cm collection adjacent to the right hip prosthesis, an acute L2 compression fracture, and new left acetabular fracture. Patient was admitted to medicine but on 7/27/2018, patient was noted to be more altered, rigorous, tachypneic, febrile to 101 F, and hypotensive w/ SBP in the 60s so an RRT was called. Pt given fluid and started on a norepinephrine gtt. Patient subsequently admitted to SICU for hemodynamic monitoring. Blood cultures positive for MRSA-abx coverage given. Decision was made to take the patient to the OR on 7/27/2018 for right hip I&D and exchange of the femoral head.  She returned to the SICU intubated on vasopressor support. She was extubated on 7/30/2018 but was reintubated on 8/2/2018. Patient was taken back to the OR on 8/4/2018 for further right hip I&D, explantation of right hemiarthroplasty, placement of antibiotic spacer, and right femur ORIF. Patient weaned off vasopressor support on 8/5/2018 extubated on 8/6/2018, but reintubated for respiratory distress on 8/10 and extubated on 8/15. Per team pt will likely need PEG.  CXRAY 8/19: IMPRESSION: NG tube appropriately situated in the stomach.  Unchanged bilateral pleural-parenchymal pattern.    Per FEES 8/21/18: Unable to make dietary recommendation 2/2 poor visualization. Would recommend MBS to objectively assess the oropharyngeal swallow mechanism, r/o aspiration, and determine least restrictive diet.  ENT revealed: FOE reveals patent non obstructive airway with prominent pharyngeal wall, B/L VC mobile and intact .

## 2018-08-22 NOTE — PROCEDURE NOTE - ADDITIONAL PROCEDURE DETAILS
left arm Double lumen PICC exchanged for Single lumen PICC after discussing with Team since pt only needs it for IV vancomycin. 39CM left arm PICC with tip in SVC.

## 2018-08-22 NOTE — PROCEDURE NOTE - NSPOSTPRCRAD_GEN_A_CORE
line adjusted to depth of insertion/fluoroscopy
no pneumothorax/post-procedure radiography performed/central line located in the superior vena cava

## 2018-08-22 NOTE — SWALLOW VFSS/MBS ASSESSMENT ADULT - ORAL PHASE
Reduced anterior - posterior transport/Residue in oral cavity Reduced anterior - posterior transport/Residue in oral cavity/Incomplete tongue to palate contact within functional limits/Residue in oral cavity/Reduced anterior - posterior transport/Incomplete tongue to palate contact

## 2018-08-22 NOTE — PROGRESS NOTE ADULT - SUBJECTIVE AND OBJECTIVE BOX
---___---___---___---___---___---___ ---___---___---___---___---___---___---___---___---___---                    <<<  M E D I C A L   A T T E N D I N G    F O L L O W    U P   N O T E  >>>    failed speech and swallow and the patient had episode of chest pain. cardiology may consider medical management light of patient's comorbid conditions   ---___---___---___---___---___---      <<<  MEDICATIONS:  >>>    MEDICATIONS  (STANDING):  ALBUTerol/ipratropium for Nebulization 3 milliLiter(s) Nebulizer every 6 hours  ascorbic acid 500 milliGRAM(s) Oral daily  buDESOnide   0.5 milliGRAM(s) Respule 0.5 milliGRAM(s) Inhalation every 12 hours  chlorhexidine 4% Liquid 1 Application(s) Topical <User Schedule>  clonazePAM Tablet 2 milliGRAM(s) Oral at bedtime  enoxaparin Injectable 40 milliGRAM(s) SubCutaneous daily  famotidine    Tablet 20 milliGRAM(s) Oral daily  famotidine    Tablet 20 milliGRAM(s) Oral daily  FIRST- Mouthwash  BLM 5 milliLiter(s) Swish and Spit daily  insulin lispro (HumaLOG) corrective regimen sliding scale   SubCutaneous every 6 hours  iron sucrose IVPB 100 milliGRAM(s) IV Intermittent every 24 hours  lidocaine   Patch 1 Patch Transdermal every 24 hours  melatonin 3 milliGRAM(s) Oral <User Schedule>  mirtazapine 45 milliGRAM(s) Oral <User Schedule>  multivitamin/minerals 1 Tablet(s) Oral daily  pantoprazole   Suspension 40 milliGRAM(s) Oral once  predniSONE   Tablet 10 milliGRAM(s) Oral every 24 hours  QUEtiapine 50 milliGRAM(s) Oral <User Schedule>  sertraline 50 milliGRAM(s) Oral daily  sodium chloride 0.9%. 1000 milliLiter(s) (10 mL/Hr) IV Continuous <Continuous>      MEDICATIONS  (PRN):  acetaminophen    Suspension. 650 milliGRAM(s) Enteral Tube every 6 hours PRN Mild Pain (1 - 3)  LORazepam     Tablet 1 milliGRAM(s) Oral once PRN Anxiety  nystatin Powder 1 Application(s) Topical two times a day PRN rash/itchiness       ---___---___---___---___---___---     <<<REVIEW OF SYSTEM: >>>         ---___---___---___---___---___---          <<<  VITAL SIGNS: >>>    T(F): 98.6 (08-22-18 @ 10:45), Max: 98.6 (08-22-18 @ 10:45)  HR: 100 (08-22-18 @ 10:45) (100 - 105)  BP: 160/74 (08-22-18 @ 10:45) (144/80 - 160/74)  RR: 20 (08-22-18 @ 10:45) (20 - 22)  SpO2: 94% (08-22-18 @ 10:45) (93% - 98%)  Wt(kg): --  CAPILLARY BLOOD GLUCOSE      POCT Blood Glucose.: 144 mg/dL (22 Aug 2018 12:02)    I&O's Summary    21 Aug 2018 07:01  -  22 Aug 2018 07:00  --------------------------------------------------------  IN: 0 mL / OUT: 300 mL / NET: -300 mL         ---___---___---___---___---___---                       PHYSICAL EXAM:    GEN: A&O X 3 , NAD , comfortable  HEENT: NCAT, PERRL, MMM, no scleral icterus, hearing intact ngt   NECK: Supple, No JVD  CVS: S1S2 , regular , No M/R/G appreciated  PULM: CTA B/L,  no W/R/R appreciated  ABD.: soft. non tender, non distended,  bowel sounds present  Extrem: intact pulses , no edema noted  Derm: No rash or ecchymosis noted  PSYCH: normal mood, no depression, not anxious     ---___---___---___---___---___---     <<<  LAB AND IMAGING: >>>                          10.1   13.3  )-----------( 508      ( 22 Aug 2018 12:08 )             32.2               08-22    138  |  102  |  31<H>  ----------------------------<  130<H>  3.7   |  23  |  0.95    Ca    8.6      22 Aug 2018 07:26  Phos  3.1     08-21  Mg     1.8     08-21      PT/INR - ( 21 Aug 2018 17:46 )   PT: 11.8 sec;   INR: 1.09 ratio                                    [All pertinent / recent available Imaging reports and other labs reviewed]     ---___---___---___---___---___---___ ---___---___---___---___---           <<<  A S S E S S M E N T   A N D   P L A N :  >>>          -GI/DVT Prophylaxis.    --------------------------------------------      >>______________________<<      Deniz Bolden .         phone   [phone]

## 2018-08-22 NOTE — SWALLOW VFSS/MBS ASSESSMENT ADULT - PHARYNGEAL PHASE COMMENTS
Given Pt's weak cough and inability to clear penetrated material, additional PO trials were not presented given aspiration risk and Pt's h/o respiratory failure.

## 2018-08-22 NOTE — SWALLOW VFSS/MBS ASSESSMENT ADULT - DIAGNOSTIC IMPRESSIONS
Pt presents with significant oropharyngeal dysphagia characterized by persistent laryngeal penetration on thin purees and honey thickened liquids with residue noted within the laryngeal vestibule.  Pt throat clearing and cued weak cough were not effective in eliminating penetrated materials. Pt appeared to be fatigued and required encouragement to accept PO trials.  Pt seated semi upright per Patient preference.  Given Pt's weak cough, hypophonic speech and inability to clear penetrated material, additional PO trials were not presented given aspiration risk and Pt's h/o respiratory failure.  Disorders include: reduced lingual strength/ROM/Rate of motion, reduced BOT to posterior pharyngeal wall contact, reduced hyo-laryngeal excursion, reduced laryngeal closure, reduced pharyngeal contractility.

## 2018-08-22 NOTE — PROGRESS NOTE ADULT - SUBJECTIVE AND OBJECTIVE BOX
Pt S/E at bedside, no acute events overnight, pain controlled. No complaints this morning. Pt to have her PICC line adjusted today. Attempted to have adjusted by IR yesterday however Pt became anxious/tachypneic in IR suite. Currently resting comfortably in bed this morning.    Vital Signs Last 24 Hrs  T(C): 36.7 (22 Aug 2018 05:05), Max: 37 (21 Aug 2018 10:46)  T(F): 98 (22 Aug 2018 05:05), Max: 98.6 (21 Aug 2018 10:46)  HR: 104 (22 Aug 2018 05:05) (100 - 105)  BP: 159/88 (22 Aug 2018 05:05) (138/84 - 159/88)  BP(mean): --  RR: 20 (22 Aug 2018 05:05) (20 - 25)  SpO2: 94% (22 Aug 2018 05:05) (93% - 98%)    Gen: NAD, +NGT    Right Lower Extremity:  Dressing clean dry intact  +EHL/FHL/TA/GS  SILT L3-S1  +DP/PT Pulses  Compartments soft  No calf TTP B/L

## 2018-08-22 NOTE — PROGRESS NOTE ADULT - ASSESSMENT
chest pain   dyshphagia   pelvic fracture   mrsa sepsis   alzheimers dementia   awaiting decision from  about peg placement discussed at length with him yesterday . that it is reversible if she passes future swallowing evaluations

## 2018-08-22 NOTE — PROGRESS NOTE ADULT - ASSESSMENT
68F s/p R hip PJI explant and placement of cement spacer with distal femur ORIF  Pain control  NWB LLE  FFWB RLE  PT/OT/OOB  Incentive spirometry  DVT ppx  FU transfer to medical service  FU PICC readjustment  Dispo planning

## 2018-08-22 NOTE — SWALLOW VFSS/MBS ASSESSMENT ADULT - BASELINE SWALLOW
Significant hypophonia; Pt c/o discomfort seated upright therefore MBS performed in semi upright position; Pt with head extended despite repositioning with pillow; overall Pt appeared fatigued and required encouragement to accept PO trials.

## 2018-08-23 LAB
ANION GAP SERPL CALC-SCNC: 9 MMOL/L — SIGNIFICANT CHANGE UP (ref 5–17)
BUN SERPL-MCNC: 34 MG/DL — HIGH (ref 7–23)
CALCIUM SERPL-MCNC: 8.4 MG/DL — SIGNIFICANT CHANGE UP (ref 8.4–10.5)
CHLORIDE SERPL-SCNC: 104 MMOL/L — SIGNIFICANT CHANGE UP (ref 96–108)
CO2 SERPL-SCNC: 28 MMOL/L — SIGNIFICANT CHANGE UP (ref 22–31)
CREAT SERPL-MCNC: 0.94 MG/DL — SIGNIFICANT CHANGE UP (ref 0.5–1.3)
GLUCOSE SERPL-MCNC: 148 MG/DL — HIGH (ref 70–99)
HCT VFR BLD CALC: 27.7 % — LOW (ref 34.5–45)
HGB BLD-MCNC: 8.8 G/DL — LOW (ref 11.5–15.5)
MCHC RBC-ENTMCNC: 29 PG — SIGNIFICANT CHANGE UP (ref 27–34)
MCHC RBC-ENTMCNC: 31.6 GM/DL — LOW (ref 32–36)
MCV RBC AUTO: 91.9 FL — SIGNIFICANT CHANGE UP (ref 80–100)
PLATELET # BLD AUTO: 462 K/UL — HIGH (ref 150–400)
POTASSIUM SERPL-MCNC: 3.8 MMOL/L — SIGNIFICANT CHANGE UP (ref 3.5–5.3)
POTASSIUM SERPL-SCNC: 3.8 MMOL/L — SIGNIFICANT CHANGE UP (ref 3.5–5.3)
RBC # BLD: 3.02 M/UL — LOW (ref 3.8–5.2)
RBC # FLD: 14 % — SIGNIFICANT CHANGE UP (ref 10.3–14.5)
SODIUM SERPL-SCNC: 141 MMOL/L — SIGNIFICANT CHANGE UP (ref 135–145)
WBC # BLD: 10.8 K/UL — HIGH (ref 3.8–10.5)
WBC # FLD AUTO: 10.8 K/UL — HIGH (ref 3.8–10.5)

## 2018-08-23 PROCEDURE — 99231 SBSQ HOSP IP/OBS SF/LOW 25: CPT

## 2018-08-23 PROCEDURE — 99233 SBSQ HOSP IP/OBS HIGH 50: CPT

## 2018-08-23 RX ORDER — CLONAZEPAM 1 MG
2 TABLET ORAL AT BEDTIME
Qty: 0 | Refills: 0 | Status: DISCONTINUED | OUTPATIENT
Start: 2018-08-23 | End: 2018-08-27

## 2018-08-23 RX ORDER — ACETAMINOPHEN 500 MG
1000 TABLET ORAL ONCE
Qty: 0 | Refills: 0 | Status: COMPLETED | OUTPATIENT
Start: 2018-08-23 | End: 2018-08-23

## 2018-08-23 RX ORDER — TRAMADOL HYDROCHLORIDE 50 MG/1
50 TABLET ORAL EVERY 6 HOURS
Qty: 0 | Refills: 0 | Status: DISCONTINUED | OUTPATIENT
Start: 2018-08-23 | End: 2018-08-27

## 2018-08-23 RX ADMIN — Medication 2 MILLIGRAM(S): at 22:11

## 2018-08-23 RX ADMIN — Medication 1000 MILLIGRAM(S): at 21:07

## 2018-08-23 RX ADMIN — IRON SUCROSE 210 MILLIGRAM(S): 20 INJECTION, SOLUTION INTRAVENOUS at 22:13

## 2018-08-23 RX ADMIN — Medication 100 MILLIGRAM(S): at 06:21

## 2018-08-23 RX ADMIN — Medication 0.5 MILLIGRAM(S): at 13:51

## 2018-08-23 RX ADMIN — Medication 400 MILLIGRAM(S): at 20:37

## 2018-08-23 RX ADMIN — Medication 3 MILLILITER(S): at 17:42

## 2018-08-23 RX ADMIN — Medication 500 MILLIGRAM(S): at 13:49

## 2018-08-23 RX ADMIN — FAMOTIDINE 20 MILLIGRAM(S): 10 INJECTION INTRAVENOUS at 13:49

## 2018-08-23 RX ADMIN — Medication 2: at 01:19

## 2018-08-23 RX ADMIN — TRAMADOL HYDROCHLORIDE 50 MILLIGRAM(S): 50 TABLET ORAL at 15:58

## 2018-08-23 RX ADMIN — Medication 3 MILLILITER(S): at 13:51

## 2018-08-23 RX ADMIN — Medication 1 TABLET(S): at 13:49

## 2018-08-23 RX ADMIN — Medication 0.5 MILLIGRAM(S): at 22:12

## 2018-08-23 RX ADMIN — DIPHENHYDRAMINE HYDROCHLORIDE AND LIDOCAINE HYDROCHLORIDE AND ALUMINUM HYDROXIDE AND MAGNESIUM HYDRO 5 MILLILITER(S): KIT at 13:50

## 2018-08-23 RX ADMIN — Medication 650 MILLIGRAM(S): at 14:00

## 2018-08-23 RX ADMIN — LIDOCAINE 1 PATCH: 4 CREAM TOPICAL at 22:12

## 2018-08-23 RX ADMIN — CHLORHEXIDINE GLUCONATE 1 APPLICATION(S): 213 SOLUTION TOPICAL at 06:22

## 2018-08-23 RX ADMIN — ENOXAPARIN SODIUM 40 MILLIGRAM(S): 100 INJECTION SUBCUTANEOUS at 13:52

## 2018-08-23 RX ADMIN — Medication 3 MILLILITER(S): at 08:04

## 2018-08-23 RX ADMIN — Medication 3 MILLIGRAM(S): at 22:12

## 2018-08-23 RX ADMIN — SERTRALINE 50 MILLIGRAM(S): 25 TABLET, FILM COATED ORAL at 13:50

## 2018-08-23 RX ADMIN — QUETIAPINE FUMARATE 50 MILLIGRAM(S): 200 TABLET, FILM COATED ORAL at 22:12

## 2018-08-23 RX ADMIN — MIRTAZAPINE 45 MILLIGRAM(S): 45 TABLET, ORALLY DISINTEGRATING ORAL at 22:12

## 2018-08-23 NOTE — PROGRESS NOTE ADULT - ASSESSMENT
mrsa sepsis     history of infected hardware     alzheimers dementia    anxiety     dysphagia      repeat cbc to confirm h/h still taking iron supplemantation  will need PEG placement ASAP

## 2018-08-23 NOTE — CONSULT NOTE ADULT - SUBJECTIVE AND OBJECTIVE BOX
Patient is a 68y Female     Patient is a 68y old  Female who presents with a chief complaint of weakness (26 Jul 2018 16:54)      HPI:  68F pmhx of asthma, anxiety/depression, HLD, UC, pHTN presents with weakness.  She was recently hospitalized here for a right hip fracture s/p repair last month.  She is chrissie in by her family who say that on Monday she had the staples removed from her hip surgery.  She was 2 weeks ago developed a hematoma on the right hip after using Plavix with Lovenox for PPX after orhto surgery so this was stopped.  She was doing fine this week until last night when she said that she had to go to the bathroom "30 times" per the  and could not urinate or have BM.  She kept trying to get out of bed and walk to the bathroom and she usually walks with a walker with some assistance but last night she actuely became weak and not able to walk with walker and 2 person assist.  She also was brought to the ED 2 weeks ago for new onset severe back pain with radiation to her left hip.  Her  says this presentation was similar to when she broke her right hip last month (has osteoperosis) and was concerned again but after ED visit said she did not break her hip.  She has been taking her Oxycodone since the surgery Q4hrs w/o pain relief.  Last night she developed rigors, did not take temperature, urinary frequency, increased confusion and agitation along with back pain and LE weakness so she was brought days.     In ED:  T 99.6 rectally HR 60-92 /58 RR 18  She was seen by orthopedics for concern of joint infection in the right hip, given Meropenum and Vancomycin, 2LNs and also 2 amps of D50 for symptomatic (AMS) hypoglycemia. (26 Jul 2018 16:54)      PAST MEDICAL & SURGICAL HISTORY:  CVA (cerebral vascular accident)  Fatty pancreas  PNA (pneumonia)  Pulmonary HTN  IGT (impaired glucose tolerance)  Ulcerative colitis  Acid reflux  Anxiety  Depression  Mouth sores  HLD (hyperlipidemia)  Asthma  Humeral head fracture  H/O: hysterectomy  H/O cataract extraction, left  History of knee replacement      MEDICATIONS  (STANDING):  ALBUTerol/ipratropium for Nebulization 3 milliLiter(s) Nebulizer every 6 hours  ascorbic acid 500 milliGRAM(s) Oral daily  buDESOnide   0.5 milliGRAM(s) Respule 0.5 milliGRAM(s) Inhalation every 12 hours  chlorhexidine 4% Liquid 1 Application(s) Topical <User Schedule>  clonazePAM Tablet 2 milliGRAM(s) Oral at bedtime  enoxaparin Injectable 40 milliGRAM(s) SubCutaneous daily  famotidine    Tablet 20 milliGRAM(s) Oral daily  famotidine    Tablet 20 milliGRAM(s) Oral daily  FIRST- Mouthwash  BLM 5 milliLiter(s) Swish and Spit daily  insulin lispro (HumaLOG) corrective regimen sliding scale   SubCutaneous every 6 hours  iron sucrose IVPB 100 milliGRAM(s) IV Intermittent every 24 hours  lidocaine   Patch 1 Patch Transdermal every 24 hours  melatonin 3 milliGRAM(s) Oral <User Schedule>  mirtazapine 45 milliGRAM(s) Oral <User Schedule>  multivitamin/minerals 1 Tablet(s) Oral daily  pantoprazole   Suspension 40 milliGRAM(s) Oral once  QUEtiapine 50 milliGRAM(s) Oral <User Schedule>  sertraline 50 milliGRAM(s) Oral daily  sodium chloride 0.9% lock flush 20 milliLiter(s) IV Push once  sodium chloride 0.9%. 1000 milliLiter(s) (10 mL/Hr) IV Continuous <Continuous>  vancomycin  IVPB 500 milliGRAM(s) IV Intermittent every 24 hours      Allergies    ASA; dye contrast (Anaphylaxis)  aspirin (Short breath)  divalproex sodium (Other (Unknown))  Haldol (Other (Unknown))  penicillin (Short breath; Rash)  sulfa drugs (Short breath; Rash)  Xanax (Other (Unknown))    Intolerances        SOCIAL HISTORY:  Denies ETOh,Smoking,     FAMILY HISTORY:  Family history of dementia (Grandparent)  Family history of colon cancer (Grandparent)      REVIEW OF SYSTEMS:    CONSTITUTIONAL: No weakness, fevers or chills  EYES/ENT: No visual changes;  No vertigo or throat pain   NECK: No pain or stiffness  RESPIRATORY: No cough, wheezing, hemoptysis; No shortness of breath  CARDIOVASCULAR: No chest pain or palpitations  GASTROINTESTINAL: No abdominal or epigastric pain. No nausea, vomiting, or hematemesis; No diarrhea or constipation. No melena or hematochezia.  GENITOURINARY: No dysuria, frequency or hematuria  NEUROLOGICAL: No numbness or weakness  SKIN: No itching, burning, rashes, or lesions   All other review of systems is negative unless indicated above.    VITAL:  T(C): , Max: 36.9 (08-22-18 @ 19:30)  T(F): , Max: 98.5 (08-22-18 @ 19:30)  HR: 103 (08-23-18 @ 13:55)  BP: 136/70 (08-23-18 @ 13:55)  BP(mean): --  RR: 20 (08-23-18 @ 13:55)  SpO2: 96% (08-23-18 @ 13:55)  Wt(kg): --    I and O's:    08-21 @ 07:01  -  08-22 @ 07:00  --------------------------------------------------------  IN: 0 mL / OUT: 300 mL / NET: -300 mL    08-22 @ 07:01  -  08-23 @ 07:00  --------------------------------------------------------  IN: 695 mL / OUT: 400 mL / NET: 295 mL    08-23 @ 07:01  -  08-23 @ 17:54  --------------------------------------------------------  IN: 0 mL / OUT: 300 mL / NET: -300 mL          PHYSICAL EXAM:    Constitutional: NAD  HEENT: PERRLA,   Neck: No JVD  Respiratory: CTA B/L  Cardiovascular: S1 and S2  Gastrointestinal: BS+, soft, NT/ND  Extremities: No peripheral edema  Neurological: A/O x 3, no focal deficits  Psychiatric: Normal mood, normal affect  : No Blackman  Skin: No rashes  Access: Not applicable  Back: No CVA tenderness    LABS:                        8.8    10.8  )-----------( 462      ( 23 Aug 2018 06:53 )             27.7     08-23    141  |  104  |  34<H>  ----------------------------<  148<H>  3.8   |  28  |  0.94    Ca    8.4      23 Aug 2018 06:53            RADIOLOGY & ADDITIONAL STUDIES:

## 2018-08-23 NOTE — PROGRESS NOTE ADULT - SUBJECTIVE AND OBJECTIVE BOX
Pt S/E at bedside, no acute events overnight, pain controlled. Resting comfortably, no complaints. Pt failed speech/swallow eval yesterday and it was recommended Pt be NPO. She will likely need PEG tube however family not currently agreeable.     Vital Signs Last 24 Hrs  T(C): 36.3 (23 Aug 2018 06:25), Max: 37 (22 Aug 2018 10:45)  T(F): 97.4 (23 Aug 2018 06:25), Max: 98.6 (22 Aug 2018 10:45)  HR: 94 (23 Aug 2018 06:25) (94 - 101)  BP: 128/77 (23 Aug 2018 06:25) (114/66 - 160/74)  BP(mean): --  RR: 20 (23 Aug 2018 06:25) (20 - 20)  SpO2: 98% (23 Aug 2018 06:25) (94% - 98%)    Gen: NAD, awake/alert    Right Lower Extremity:  Dressing clean dry intact  +EHL/FHL/TA/GS  SILT L3-S1  +DP/PT Pulses  Compartments soft  No calf TTP B/L

## 2018-08-23 NOTE — PROGRESS NOTE ADULT - ASSESSMENT
68 year-old woman with Alzheimer's dementia seen to have episode of periprocedural anxiety, shortness of breath, and chest discomfort that resolved spontaneously and has not recurred.  Her most recent TTE shows interval improvement in LV systolic function.    First set of high sensitivity troponin was elevated. Second set showed mild rise, not consistent with acute coronary syndrome.  Suspect demand ischemia in the setting comorbid conditions.    No plans for cardiac interventions at this time.    Plan for PEG placement today.  Discharge planning to rehab per primary team.  Please reconsult for any further cardiovascular concerns.

## 2018-08-23 NOTE — PROGRESS NOTE ADULT - SUBJECTIVE AND OBJECTIVE BOX
CC: f/u for  mrsa hip prosthetic infection  Patient reports nothing she is sleeping    REVIEW OF SYSTEMS: cannot get  All other review of systems     Antimicrobials Day #  :19/42  vancomycin  IVPB 500 milliGRAM(s) IV Intermittent every 24 hours    Other Medications Reviewed    T(F): 97.1 (08-23-18 @ 10:53), Max: 98.5 (08-22-18 @ 19:30)  HR: 102 (08-23-18 @ 10:53)  BP: 128/69 (08-23-18 @ 10:53)  RR: 22 (08-23-18 @ 10:53)  SpO2: 93% (08-23-18 @ 10:53)  Wt(kg): --    PHYSICAL EXAM:  General: soundly sleeping but will rouse no acute distress  Eyes:  anicteric, no conjunctival injection, no discharge  Oropharynx: no lesions or injection 	  Neck: supple, without adenopathy  Lungs: clear anterior to auscultation  Heart: regular rate and rhythm; no murmur, rubs or gallops  Abdomen: soft, nondistended, nontender, without mass or organomegaly  Skin: no lesions  Extremities: wound clean on right hip  Neurologic: sleeping but rouses    LAB RESULTS:                        8.8    10.8  )-----------( 462      ( 23 Aug 2018 06:53 )             27.7     08-23    141  |  104  |  34<H>  ----------------------------<  148<H>  3.8   |  28  |  0.94    Ca    8.4      23 Aug 2018 06:53          MICROBIOLOGY:  RECENT CULTURES:  Culture - Blood (08.12.18 @ 00:16)    Specimen Source: .Blood Blood-Arterial    Culture Results:   No growth at 5 days.    Culture - Urine (08.12.18 @ 05:20)    Specimen Source: .Urine Catheterized    Culture Results:   <10,000 CFU/ml  Normal Urogenital vince present        RADIOLOGY REVIEWED:  < from: Xray Chest 1 View- PORTABLE-Urgent (08.21.18 @ 18:53) >  IMPRESSION:       Left-sided PICC with tip in the right atrium. Enteric tube with tip in   the stomach. Finding discussed by Dr. Balbuena to Dr. Cash on 8/22/2018 at   4:55 PM.    Small left pleural effusion with left lung base atelectasis. Right lower   lung linear atelectasis.    < end of copied text >      Assessment:  Patient with mrsa hip prosthetic infection failed washout now had leisa and spacer with post op severe sepsis unclear source now improved.   Plan: continue iv vancomycin for another 23 days  supportive care

## 2018-08-23 NOTE — PROGRESS NOTE ADULT - ASSESSMENT
68F s/p R hip PJI explant and placement of cement spacer with distal femur ORIF  Pain control  NWB LLE  FFWB RLE  PT/OT/OOB  Incentive spirometry  DVT ppx  FU transfer to medical service  FU potential PEG tube as Pt failed speech/swallow yesterday  Dispo planning

## 2018-08-23 NOTE — PROGRESS NOTE ADULT - SUBJECTIVE AND OBJECTIVE BOX
HPI:  No events overnight.  Patient received PICC yesterday evening.  No further reports of chest pain or shortness of breath.  Planning for PEG as patient cannot yet pass swallow study.    Review Of Systems:     Unreliable review of systems in the setting of dementia      Respiratory: No shortness of breath, cough, or wheezing  Cardiovascular: No chest pain or palpitations  10 point review of systems is otherwise negative except as mentioned above        Medications:  acetaminophen    Suspension. 650 milliGRAM(s) Enteral Tube every 6 hours PRN  ALBUTerol/ipratropium for Nebulization 3 milliLiter(s) Nebulizer every 6 hours  ascorbic acid 500 milliGRAM(s) Oral daily  buDESOnide   0.5 milliGRAM(s) Respule 0.5 milliGRAM(s) Inhalation every 12 hours  chlorhexidine 4% Liquid 1 Application(s) Topical <User Schedule>  enoxaparin Injectable 40 milliGRAM(s) SubCutaneous daily  famotidine    Tablet 20 milliGRAM(s) Oral daily  famotidine    Tablet 20 milliGRAM(s) Oral daily  FIRST- Mouthwash  BLM 5 milliLiter(s) Swish and Spit daily  insulin lispro (HumaLOG) corrective regimen sliding scale   SubCutaneous every 6 hours  iron sucrose IVPB 100 milliGRAM(s) IV Intermittent every 24 hours  lidocaine   Patch 1 Patch Transdermal every 24 hours  melatonin 3 milliGRAM(s) Oral <User Schedule>  mirtazapine 45 milliGRAM(s) Oral <User Schedule>  multivitamin/minerals 1 Tablet(s) Oral daily  nystatin Powder 1 Application(s) Topical two times a day PRN  pantoprazole   Suspension 40 milliGRAM(s) Oral once  predniSONE   Tablet 10 milliGRAM(s) Oral every 24 hours  QUEtiapine 50 milliGRAM(s) Oral <User Schedule>  sertraline 50 milliGRAM(s) Oral daily  sodium chloride 0.9% lock flush 10 milliLiter(s) IV Push every 1 hour PRN  sodium chloride 0.9% lock flush 10 milliLiter(s) IV Push every 12 hours PRN  sodium chloride 0.9% lock flush 20 milliLiter(s) IV Push once  sodium chloride 0.9%. 1000 milliLiter(s) IV Continuous <Continuous>  vancomycin  IVPB 500 milliGRAM(s) IV Intermittent every 24 hours    PAST MEDICAL & SURGICAL HISTORY:  CVA (cerebral vascular accident)  Fatty pancreas  PNA (pneumonia)  Pulmonary HTN  IGT (impaired glucose tolerance)  Ulcerative colitis  Acid reflux  Anxiety  Depression  Mouth sores  HLD (hyperlipidemia)  Asthma  Humeral head fracture  H/O: hysterectomy  H/O cataract extraction, left  History of knee replacement    Vitals:  T(C): 36.2 (08-23-18 @ 10:53), Max: 36.9 (08-22-18 @ 19:30)  HR: 102 (08-23-18 @ 10:53) (94 - 102)  BP: 128/69 (08-23-18 @ 10:53) (114/66 - 152/86)  BP(mean): --  RR: 22 (08-23-18 @ 10:53) (20 - 22)  SpO2: 93% (08-23-18 @ 10:53) (93% - 98%)  Wt(kg): --  Daily     Daily   I&O's Summary    22 Aug 2018 07:01  -  23 Aug 2018 07:00  --------------------------------------------------------  IN: 695 mL / OUT: 400 mL / NET: 295 mL        Physical Exam:  Appearance: Normal, well groomed, NAD  Eyes: PERRLA, EOMI, pink conjunctiva, no scleral icterus   HENT: Normal oral mucosa; +NGT tube   Cardiovascular: RRR, S1, S2, no murmur, rub, or gallop; no edema; no JVD; PICC in LUE  Respiratory: Clear to auscultation bilaterally  Gastrointestinal: Soft, non-tender, non-distended, BS+  Musculoskeletal: No clubbing or joint deformity   Neurologic: No focal weakness  Lymphatic: No lymphadenopathy  Psychiatry: awake and alert  Skin: No rashes, ecchymoses, or cyanosis                          8.8    10.8  )-----------( 462      ( 23 Aug 2018 06:53 )             27.7     08-23    141  |  104  |  34<H>  ----------------------------<  148<H>  3.8   |  28  |  0.94    Ca    8.4      23 Aug 2018 06:53      PT/INR - ( 21 Aug 2018 17:46 )   PT: 11.8 sec;   INR: 1.09 ratio             Cardiovascular Diagnostic Testing:    ECG: sinus tachycardia at 106 bpm, fusion complex x1, RBBB, unchanged compared with outpatient ECG 11/13/2017    Echo: < from: Transesophageal Echocardiogram (07.30.18 @ 16:42) >  ------------------------------------------------------------------------  Conclusions:  1. Tethered , mildly calcified mitral valve leaflets with  normal opening. Mild-moderate mitral regurgitation.  2. Sclerotic aortic valve leaflets with normal opening.  Mild aortic regurgitation.  3. Severe global left ventricular systolic dysfunction.  4. Right ventricular enlargement with decreased right  ventricular systolic function.  5. Thickened tricuspid valve. Mild-moderate tricuspid  regurgitation.  6. Color Doppler demonstrates evidence of a patent foramen  ovale.  7. No evidence of valvular vegetation is seen.  ------------------------------------------------------------------------  Confirmed on  8/1/2018 - 15:06:58 by Margaret Khan M.D.  ------------------------------------------------------------------------    < end of copied text >    < from: TTE with Doppler (w/Cont) (08.17.18 @ 20:12) >  ------------------------------------------------------------------------  Conclusions:  1. Endocardial visualization enhanced with intravenous  injection of Ultrasonic Enhancing Agent (Definity). Left  ventricle suboptimally visualized despite the use of  intravenous echo contrast; overall normal left ventricular  systolic function. The mid inferoseptum appears hypokinetic  on certain views.  2. The right ventricle is notwell visualized; grossly  normal right ventricular size and systolic function.  *** Compared with echocardiogram of 7/30/2018, there has  been improvement of LV and RV systolic function.  ------------------------------------------------------------------------  Confirmed on  8/18/2018 - 12:38:38 by Belkys Altman M.D.  ------------------------------------------------------------------------    < end of copied text >

## 2018-08-23 NOTE — PROGRESS NOTE ADULT - SUBJECTIVE AND OBJECTIVE BOX
---___---___---___---___---___---___ ---___---___---___---___---___---___---___---___---___---                    <<<  M E D I C A L   A T T E N D I N G    F O L L O W    U P   N O T E  >>>    spoke to cardiology and  today . chest pain not likely to be cardiac related. also  willing to get peg tube. need gi consult for peg placement      ---___---___---___---___---___---      <<<  MEDICATIONS:  >>>    MEDICATIONS  (STANDING):  ALBUTerol/ipratropium for Nebulization 3 milliLiter(s) Nebulizer every 6 hours  ascorbic acid 500 milliGRAM(s) Oral daily  buDESOnide   0.5 milliGRAM(s) Respule 0.5 milliGRAM(s) Inhalation every 12 hours  chlorhexidine 4% Liquid 1 Application(s) Topical <User Schedule>  enoxaparin Injectable 40 milliGRAM(s) SubCutaneous daily  famotidine    Tablet 20 milliGRAM(s) Oral daily  famotidine    Tablet 20 milliGRAM(s) Oral daily  FIRST- Mouthwash  BLM 5 milliLiter(s) Swish and Spit daily  insulin lispro (HumaLOG) corrective regimen sliding scale   SubCutaneous every 6 hours  iron sucrose IVPB 100 milliGRAM(s) IV Intermittent every 24 hours  lidocaine   Patch 1 Patch Transdermal every 24 hours  melatonin 3 milliGRAM(s) Oral <User Schedule>  mirtazapine 45 milliGRAM(s) Oral <User Schedule>  multivitamin/minerals 1 Tablet(s) Oral daily  pantoprazole   Suspension 40 milliGRAM(s) Oral once  predniSONE   Tablet 10 milliGRAM(s) Oral every 24 hours  QUEtiapine 50 milliGRAM(s) Oral <User Schedule>  sertraline 50 milliGRAM(s) Oral daily  sodium chloride 0.9% lock flush 20 milliLiter(s) IV Push once  sodium chloride 0.9%. 1000 milliLiter(s) (10 mL/Hr) IV Continuous <Continuous>  vancomycin  IVPB 500 milliGRAM(s) IV Intermittent every 24 hours      MEDICATIONS  (PRN):  acetaminophen    Suspension. 650 milliGRAM(s) Enteral Tube every 6 hours PRN Mild Pain (1 - 3)  nystatin Powder 1 Application(s) Topical two times a day PRN rash/itchiness  sodium chloride 0.9% lock flush 10 milliLiter(s) IV Push every 1 hour PRN After each medication administration  sodium chloride 0.9% lock flush 10 milliLiter(s) IV Push every 12 hours PRN Lumen of catheter NOT used       ---___---___---___---___---___---     <<<REVIEW OF SYSTEM: >>>    GEN: no fever, no chills, no pain  RESP: no SOB, no cough, no sputum  CVS: no chest pain, no palpitations, no edema  GI: no abdominal pain, no nausea, no vomiting, no constipation, no diarrhea  : no dysurea, no frequency  NEURO: no headache, no dizziness  PSYCH: no depression, not anxious  Derm : no itching, no rash     ---___---___---___---___---___---          <<<  VITAL SIGNS: >>>    T(F): 97.4 (08-23-18 @ 06:25), Max: 98.6 (08-22-18 @ 10:45)  HR: 94 (08-23-18 @ 06:25) (94 - 101)  BP: 128/77 (08-23-18 @ 06:25) (114/66 - 160/74)  RR: 20 (08-23-18 @ 06:25) (20 - 20)  SpO2: 98% (08-23-18 @ 06:25) (94% - 98%)  Wt(kg): --  CAPILLARY BLOOD GLUCOSE      POCT Blood Glucose.: 147 mg/dL (23 Aug 2018 06:23)    I&O's Summary    22 Aug 2018 07:01  -  23 Aug 2018 07:00  --------------------------------------------------------  IN: 695 mL / OUT: 400 mL / NET: 295 mL         ---___---___---___---___---___---                       PHYSICAL EXAM:    GEN: A&O X 3 , NAD , comfortable  HEENT: NCAT, PERRL, MMM, no scleral icterus, hearing intact  NECK: Supple, No JVD  CVS: S1S2 , regular , No M/R/G appreciated  PULM: CTA B/L,  no W/R/R appreciated  ABD.: soft. non tender, non distended,  bowel sounds present  Extrem: intact pulses , no edema noted  Derm: No rash or ecchymosis noted  PSYCH: normal mood, no depression, not anxious     ---___---___---___---___---___---     <<<  LAB AND IMAGING: >>>                          8.8    10.8  )-----------( 462      ( 23 Aug 2018 06:53 )             27.7               08-23    141  |  104  |  34<H>  ----------------------------<  148<H>  3.8   |  28  |  0.94    Ca    8.4      23 Aug 2018 06:53      PT/INR - ( 21 Aug 2018 17:46 )   PT: 11.8 sec;   INR: 1.09 ratio                                    [All pertinent / recent available Imaging reports and other labs reviewed]     ---___---___---___---___---___---___ ---___---___---___---___---           <<<  A S S E S S M E N T   A N D   P L A N :  >>>          -GI/DVT Prophylaxis.    --------------------------------------------  Case discussed with     Education given on     >>______________________<<      Deniz Bolden .         phone   [phone]

## 2018-08-23 NOTE — PROGRESS NOTE ADULT - SUBJECTIVE AND OBJECTIVE BOX
PULMONARY PROGRESS NOTE    MYRIAM HEATH  MRN-[id number]    Patient is a 68y old  Female who presents with a chief complaint of weakness (26 Jul 2018 16:54)      HPI:  doing well, no complaints,  at bedside, awaiting PEG.    MEDICATIONS  (STANDING):  ALBUTerol/ipratropium for Nebulization 3 milliLiter(s) Nebulizer every 6 hours  ascorbic acid 500 milliGRAM(s) Oral daily  buDESOnide   0.5 milliGRAM(s) Respule 0.5 milliGRAM(s) Inhalation every 12 hours  chlorhexidine 4% Liquid 1 Application(s) Topical <User Schedule>  enoxaparin Injectable 40 milliGRAM(s) SubCutaneous daily  famotidine    Tablet 20 milliGRAM(s) Oral daily  famotidine    Tablet 20 milliGRAM(s) Oral daily  FIRST- Mouthwash  BLM 5 milliLiter(s) Swish and Spit daily  insulin lispro (HumaLOG) corrective regimen sliding scale   SubCutaneous every 6 hours  iron sucrose IVPB 100 milliGRAM(s) IV Intermittent every 24 hours  lidocaine   Patch 1 Patch Transdermal every 24 hours  melatonin 3 milliGRAM(s) Oral <User Schedule>  mirtazapine 45 milliGRAM(s) Oral <User Schedule>  multivitamin/minerals 1 Tablet(s) Oral daily  pantoprazole   Suspension 40 milliGRAM(s) Oral once  predniSONE   Tablet 10 milliGRAM(s) Oral every 24 hours  QUEtiapine 50 milliGRAM(s) Oral <User Schedule>  sertraline 50 milliGRAM(s) Oral daily  sodium chloride 0.9% lock flush 20 milliLiter(s) IV Push once  sodium chloride 0.9%. 1000 milliLiter(s) (10 mL/Hr) IV Continuous <Continuous>  vancomycin  IVPB 500 milliGRAM(s) IV Intermittent every 24 hours    MEDICATIONS  (PRN):  acetaminophen    Suspension. 650 milliGRAM(s) Enteral Tube every 6 hours PRN Mild Pain (1 - 3)  nystatin Powder 1 Application(s) Topical two times a day PRN rash/itchiness  sodium chloride 0.9% lock flush 10 milliLiter(s) IV Push every 1 hour PRN After each medication administration  sodium chloride 0.9% lock flush 10 milliLiter(s) IV Push every 12 hours PRN Lumen of catheter NOT used          EXAM:  Vital Signs Last 24 Hrs  T(C): 36.3 (23 Aug 2018 06:25), Max: 37 (22 Aug 2018 10:45)  T(F): 97.4 (23 Aug 2018 06:25), Max: 98.6 (22 Aug 2018 10:45)  HR: 94 (23 Aug 2018 06:25) (94 - 101)  BP: 128/77 (23 Aug 2018 06:25) (114/66 - 160/74)  BP(mean): --  RR: 20 (23 Aug 2018 06:25) (20 - 20)  SpO2: 98% (23 Aug 2018 06:25) (94% - 98%)      GENERAL: The patient is awake and alert in no apparent distress, soft restraints on    LUNGS: clear anteriorly      LABS/IMAGING: reviewed                                              8.8    10.8  )-----------( 462      ( 23 Aug 2018 06:53 )             27.7   08-23    141  |  104  |  34<H>  ----------------------------<  148<H>  3.8   |  28  |  0.94    Ca    8.4      23 Aug 2018 06:53            < from: Xray Chest 1 View- PORTABLE-Routine (08.21.18 @ 09:58) >  IMPRESSION:     The left-sided PICC is in place with its tip in the azygos vein,   recommend adjustment.    Unchanged hazy opacities of the right lung.    Dr. Mckinnon discussed these findings with Dr. Armenta on 8/21/2018 12:00 PM,   with read back.    < end of copied text >          PROBLEM LIST:  68y Female with HEALTH ISSUES - PROBLEM Dx:  Fever, unspecified fever cause: Fever, unspecified fever cause  Anxiety: Anxiety  IGT (impaired glucose tolerance): IGT (impaired glucose tolerance)  Metabolic acidosis, NAG, bicarbonate losses: Metabolic acidosis, NAG, bicarbonate losses  Hypokalemia: Hypokalemia  Sepsis due to methicillin susceptible Staphylococcus aureus: Sepsis due to methicillin susceptible Staphylococcus aureus  Hyponatremia: Hyponatremia  Pelvic fracture: Pelvic fracture  Asthma: Asthma  Electrolyte abnormality: Electrolyte abnormality  Troponin level elevated: Troponin level elevated  Hypoglycemia: Hypoglycemia  Metabolic acidosis with increased anion gap and accumulation of organic acids: Metabolic acidosis with increased anion gap and accumulation of organic acids  Urinary frequency: Urinary frequency  Toxic metabolic encephalopathy: Toxic metabolic encephalopathy  Acute back pain with sciatica, left: Acute back pain with sciatica, left  Severe sepsis: Severe sepsis  Weakness of left lower extremity: Weakness of left lower extremity      RECS:  -pulmicort and duoneb, for asthma.  would dc prednisone.  -incentive neelima, deep breathing exercises  -ID/surgery follow up        Luciana Mancini MD   [phone]

## 2018-08-24 PROCEDURE — 73502 X-RAY EXAM HIP UNI 2-3 VIEWS: CPT | Mod: 26,RT

## 2018-08-24 PROCEDURE — 36584 COMPL RPLCMT PICC RS&I: CPT

## 2018-08-24 PROCEDURE — 77001 FLUOROGUIDE FOR VEIN DEVICE: CPT | Mod: 26

## 2018-08-24 PROCEDURE — 73552 X-RAY EXAM OF FEMUR 2/>: CPT | Mod: 26,RT

## 2018-08-24 PROCEDURE — 71045 X-RAY EXAM CHEST 1 VIEW: CPT | Mod: 26

## 2018-08-24 RX ORDER — SODIUM CHLORIDE 9 MG/ML
1000 INJECTION, SOLUTION INTRAVENOUS
Qty: 0 | Refills: 0 | Status: DISCONTINUED | OUTPATIENT
Start: 2018-08-24 | End: 2018-08-27

## 2018-08-24 RX ADMIN — Medication 2 MILLIGRAM(S): at 22:39

## 2018-08-24 RX ADMIN — MIRTAZAPINE 45 MILLIGRAM(S): 45 TABLET, ORALLY DISINTEGRATING ORAL at 22:41

## 2018-08-24 RX ADMIN — Medication 0.5 MILLIGRAM(S): at 22:43

## 2018-08-24 RX ADMIN — LIDOCAINE 1 PATCH: 4 CREAM TOPICAL at 11:47

## 2018-08-24 RX ADMIN — QUETIAPINE FUMARATE 50 MILLIGRAM(S): 200 TABLET, FILM COATED ORAL at 22:40

## 2018-08-24 RX ADMIN — Medication 3 MILLILITER(S): at 11:54

## 2018-08-24 RX ADMIN — Medication 0.5 MILLIGRAM(S): at 14:27

## 2018-08-24 RX ADMIN — Medication 3 MILLILITER(S): at 06:46

## 2018-08-24 RX ADMIN — LIDOCAINE 1 PATCH: 4 CREAM TOPICAL at 22:42

## 2018-08-24 RX ADMIN — Medication 3 MILLIGRAM(S): at 22:40

## 2018-08-24 RX ADMIN — SODIUM CHLORIDE 75 MILLILITER(S): 9 INJECTION, SOLUTION INTRAVENOUS at 15:08

## 2018-08-24 RX ADMIN — SODIUM CHLORIDE 75 MILLILITER(S): 9 INJECTION, SOLUTION INTRAVENOUS at 22:39

## 2018-08-24 NOTE — PROGRESS NOTE ADULT - SUBJECTIVE AND OBJECTIVE BOX
68F pmhx of asthma, anxiety/depression, HLD, UC, pHTN admitted with right hip s/p THR s/p R hip PJI explant and placement of cement spacer with distal femur ORIF s/p PICC line found to have the PICC to be in the coiled in the region of the superior vena cava/azygos vein referred to IR for PICC exchange.      Allergies:ASA; dye contrast (Anaphylaxis)  aspirin (Short breath)  divalproex sodium (Other (Unknown))  Haldol (Other (Unknown))  penicillin (Short breath; Rash)  sulfa drugs (Short breath; Rash)  Xanax (Other (Unknown))      PAST MEDICAL & SURGICAL HISTORY:  CVA (cerebral vascular accident)  Fatty pancreas  PNA (pneumonia)  Pulmonary HTN  IGT (impaired glucose tolerance)  Ulcerative colitis  Acid reflux  Anxiety  Depression  Mouth sores  HLD (hyperlipidemia)  Asthma  Humeral head fracture  H/O: hysterectomy  H/O cataract extraction, left  History of knee replacement        Pertinent labs:                      8.8    10.8  )-----------( 462      ( 23 Aug 2018 06:53 )             27.7   08-23    141  |  104  |  34<H>  ----------------------------<  148<H>  3.8   |  28  |  0.94    Ca    8.4      23 Aug 2018 06:53        Consent: Procedure/risks/ Benefits explained. Informed consent obtained from pt's . Pt's  verbalizes understanding.

## 2018-08-24 NOTE — PROGRESS NOTE ADULT - SUBJECTIVE AND OBJECTIVE BOX
CC: f/u for MRSA bacteremia and Rt hip infection    Patient reports: she is calm, comfortable, baseline dementia    REVIEW OF SYSTEMS:  All other review of systems negative (Comprehensive ROS)    Antimicrobials Day #  :day 20  vancomycin  IVPB 500 milliGRAM(s) IV Intermittent every 24 hours    Other Medications Reviewed    T(F): 98.3 (08-24-18 @ 14:34), Max: 98.9 (08-24-18 @ 10:21)  HR: 104 (08-24-18 @ 14:34)  BP: 150/82 (08-24-18 @ 14:34)  RR: 18 (08-24-18 @ 14:34)  SpO2: 93% (08-24-18 @ 14:34)  Wt(kg): --    PHYSICAL EXAM:  General: alert, no acute distress  Eyes:  anicteric, no conjunctival injection, no discharge  Oropharynx: no lesions or injection , NG tube in place	  Neck: supple, without adenopathy  Lungs: clear to auscultation  Heart: regular rate and rhythm; no murmur, rubs or gallops  Abdomen: soft, nondistended, nontender, without mass or organomegaly  Skin: no lesions  Extremities: no clubbing, cyanosis, or edema  Neurologic: alert, minimally verbal, moves all extremities    LAB RESULTS:                        8.8    10.8  )-----------( 462      ( 23 Aug 2018 06:53 )             27.7     08-23    141  |  104  |  34<H>  ----------------------------<  148<H>  3.8   |  28  |  0.94    Ca    8.4      23 Aug 2018 06:53          MICROBIOLOGY:  RECENT CULTURES:      RADIOLOGY REVIEWED:    < from: Xray Femur 2 Views, Right (08.24.18 @ 14:09) >  INTERPRETATION:  CLINICAL INDICATION: Status post ORIF, replant.    EXAM: AP view of the pelvis, AP and lateral views of the right hip and AP   and lateral views of the right femur.    COMPARISON: Pelvic radiograph dated 8/10/2018.      IMPRESSION:   Status post right hip ORIF. Cemented spacer is visualized. The   femoroacetabular articulation is intact. Proximal right cerclage wire is   visualized as well as partially laterally oriented fixation plate and   screws. Two interfragmentary screws are visualized at the distal femoral   diaphysis. Status post total right knee arthroplasty. No evidence of   hardware complication. Redemonstration of left superomedial acetabular   fracture. Degenerative changes of the bilateral sacroiliac joints. Pubic   symphysis.    < end of copied text >

## 2018-08-24 NOTE — PROGRESS NOTE ADULT - ASSESSMENT
68F s/p R hip PJI explant and placement of cement spacer with distal femur ORIF  Pain control  NWB RLE, FFWB LLE  FU XRs, DC sutures today,   PT/OT/OOB  Incentive spirometry  DVT ppx  PEG placement today per GI, TF held  Dispo planning    ortho [id number]

## 2018-08-24 NOTE — PROGRESS NOTE ADULT - SUBJECTIVE AND OBJECTIVE BOX
MYRIAM WHITE:[id number],   68yFemale followed for:  ASA; dye contrast (Anaphylaxis)  aspirin (Short breath)  divalproex sodium (Other (Unknown))  Haldol (Other (Unknown))  penicillin (Short breath; Rash)  sulfa drugs (Short breath; Rash)  Xanax (Other (Unknown))    PAST MEDICAL & SURGICAL HISTORY:  CVA (cerebral vascular accident)  Fatty pancreas  PNA (pneumonia)  Pulmonary HTN  IGT (impaired glucose tolerance)  Ulcerative colitis  Acid reflux  Anxiety  Depression  Mouth sores  HLD (hyperlipidemia)  Asthma  Humeral head fracture  H/O: hysterectomy  H/O cataract extraction, left  History of knee replacement    FAMILY HISTORY:  Family history of dementia (Grandparent)  Family history of colon cancer (Grandparent)    MEDICATIONS  (STANDING):  ALBUTerol/ipratropium for Nebulization 3 milliLiter(s) Nebulizer every 6 hours  buDESOnide   0.5 milliGRAM(s) Respule 0.5 milliGRAM(s) Inhalation every 12 hours  chlorhexidine 4% Liquid 1 Application(s) Topical <User Schedule>  clonazePAM Tablet 2 milliGRAM(s) Oral at bedtime  dextrose 5% + lactated ringers. 1000 milliLiter(s) (75 mL/Hr) IV Continuous <Continuous>  enoxaparin Injectable 40 milliGRAM(s) SubCutaneous daily  famotidine    Tablet 20 milliGRAM(s) Oral daily  famotidine    Tablet 20 milliGRAM(s) Oral daily  FIRST- Mouthwash  BLM 5 milliLiter(s) Swish and Spit daily  insulin lispro (HumaLOG) corrective regimen sliding scale   SubCutaneous every 6 hours  lidocaine   Patch 1 Patch Transdermal every 24 hours  melatonin 3 milliGRAM(s) Oral <User Schedule>  mirtazapine 45 milliGRAM(s) Oral <User Schedule>  multivitamin/minerals 1 Tablet(s) Oral daily  pantoprazole   Suspension 40 milliGRAM(s) Oral once  QUEtiapine 50 milliGRAM(s) Oral <User Schedule>  sertraline 50 milliGRAM(s) Oral daily  sodium chloride 0.9% lock flush 20 milliLiter(s) IV Push once  vancomycin  IVPB 500 milliGRAM(s) IV Intermittent every 24 hours    MEDICATIONS  (PRN):  acetaminophen    Suspension. 650 milliGRAM(s) Enteral Tube every 6 hours PRN Mild Pain (1 - 3)  nystatin Powder 1 Application(s) Topical two times a day PRN rash/itchiness  sodium chloride 0.9% lock flush 10 milliLiter(s) IV Push every 1 hour PRN After each medication administration  sodium chloride 0.9% lock flush 10 milliLiter(s) IV Push every 12 hours PRN Lumen of catheter NOT used  traMADol 50 milliGRAM(s) Oral every 6 hours PRN Severe Pain (7 - 10)      Vital Signs Last 24 Hrs  T(C): 36.8 (24 Aug 2018 14:34), Max: 37.2 (24 Aug 2018 10:21)  T(F): 98.3 (24 Aug 2018 14:34), Max: 98.9 (24 Aug 2018 10:21)  HR: 104 (24 Aug 2018 14:34) (95 - 104)  BP: 150/82 (24 Aug 2018 14:34) (124/76 - 150/82)  BP(mean): --  RR: 18 (24 Aug 2018 14:34) (16 - 18)  SpO2: 93% (24 Aug 2018 14:34) (92% - 93%)  nc/at  s1s2  cta  soft, nt, nd no guarding or rebound  no c/c/e    CBC Full  -  ( 23 Aug 2018 06:53 )  WBC Count : 10.8 K/uL  Hemoglobin : 8.8 g/dL  Hematocrit : 27.7 %  Platelet Count - Automated : 462 K/uL  Mean Cell Volume : 91.9 fl  Mean Cell Hemoglobin : 29.0 pg  Mean Cell Hemoglobin Concentration : 31.6 gm/dL  Auto Neutrophil # : x  Auto Lymphocyte # : x  Auto Monocyte # : x  Auto Eosinophil # : x  Auto Basophil # : x  Auto Neutrophil % : x  Auto Lymphocyte % : x  Auto Monocyte % : x  Auto Eosinophil % : x  Auto Basophil % : x    08-23    141  |  104  |  34<H>  ----------------------------<  148<H>  3.8   |  28  |  0.94    Ca    8.4      23 Aug 2018 06:53

## 2018-08-24 NOTE — PROGRESS NOTE ADULT - SUBJECTIVE AND OBJECTIVE BOX
Interventional Radiology Brief- Operative Note    Procedure: Left PICC exchange    Operators: Lucas Neil MD    EBL: 1 mL    Findings: existing PICC is within the azygous vein     Plan of Care: successful exchange for a new 40 cm PICC. The tip is in the superior cavoatrial junction. PICC is ready for immediate use

## 2018-08-24 NOTE — PROGRESS NOTE ADULT - SUBJECTIVE AND OBJECTIVE BOX
Ortho Progress Note    S: Patient seen and examined. No acute events overnight. Pain well controlled with current regimen. Denies lightheadedness/dizziness, CP/SOB. tube feeds held at midnight for PEG placement today. had some abdominal pain overnight, now resolved      O:  Physical Exam:  Gen: Laying in bed, NAD, alert and oriented.   Resp: Unlabored breathing  abd: soft, ntnd  Ext: EHL/FHL/TA/Sol intact          + SILT DP/SP/REED/Sa          +DP, extremity WWP  aquacell c/d/i    Vital Signs Last 24 Hrs  T(C): 36.7 (24 Aug 2018 06:18), Max: 37 (23 Aug 2018 15:24)  T(F): 98 (24 Aug 2018 06:18), Max: 98.6 (23 Aug 2018 15:24)  HR: 100 (24 Aug 2018 06:18) (99 - 103)  BP: 133/63 (24 Aug 2018 06:18) (128/69 - 148/78)  BP(mean): --  RR: 18 (24 Aug 2018 06:18) (18 - 22)  SpO2: 92% (24 Aug 2018 06:18) (92% - 97%)                          8.8    10.8  )-----------( 462      ( 23 Aug 2018 06:53 )             27.7                         10.1   13.3  )-----------( 508      ( 22 Aug 2018 12:08 )             32.2       08-23    141  |  104  |  34<H>  ----------------------------<  148<H>  3.8   |  28  |  0.94

## 2018-08-24 NOTE — PROVIDER CONTACT NOTE (OTHER) - ASSESSMENT
Pt is currently sleeping calmly. KO feed intact, feeding is off. Wrist restraints intact. Pt has IVF running through PICC.

## 2018-08-24 NOTE — PROGRESS NOTE ADULT - SUBJECTIVE AND OBJECTIVE BOX
Pre-Endoscopy Evaluation      Referring Physician:  Dr. Rey                                  Procedure: Egd/Peg placement    Indication for Procedure: Dysphagia    Pertinent History: 68 year-old woman with asthma, anxiety/depression, HLD, UC, pHTN presents with weakness; s/p repair right hip fracture last month c/b infected right hip prosthesis- s/p washout further c/b respiratory failure s/p multiple intubation/Extubations now with dysphagia    Sedation by Anesthesia [x]    PAST MEDICAL & SURGICAL HISTORY:  CVA (cerebral vascular accident)  Fatty pancreas  PNA (pneumonia)  Pulmonary HTN  IGT (impaired glucose tolerance)  Ulcerative colitis  Acid reflux  Anxiety  Depression  Mouth sores  HLD (hyperlipidemia)  Asthma  Humeral head fracture  H/O: hysterectomy  H/O cataract extraction, left  History of knee replacement      PMH of Gastroparesis [ ]  Gastric Surgery [ ]  Gastric Outlet Obstruction [ ]    Allergies    ASA; dye contrast (Anaphylaxis)  aspirin (Short breath)  divalproex sodium (Other (Unknown))  Haldol (Other (Unknown))  penicillin (Short breath; Rash)  sulfa drugs (Short breath; Rash)  Xanax (Other (Unknown))    Intolerances    Latex allergy: [ ] yes [x] no    Medications:MEDICATIONS  (STANDING):  ALBUTerol/ipratropium for Nebulization 3 milliLiter(s) Nebulizer every 6 hours  buDESOnide   0.5 milliGRAM(s) Respule 0.5 milliGRAM(s) Inhalation every 12 hours  chlorhexidine 4% Liquid 1 Application(s) Topical <User Schedule>  clonazePAM Tablet 2 milliGRAM(s) Oral at bedtime  enoxaparin Injectable 40 milliGRAM(s) SubCutaneous daily  famotidine    Tablet 20 milliGRAM(s) Oral daily  famotidine    Tablet 20 milliGRAM(s) Oral daily  FIRST- Mouthwash  BLM 5 milliLiter(s) Swish and Spit daily  insulin lispro (HumaLOG) corrective regimen sliding scale   SubCutaneous every 6 hours  lidocaine   Patch 1 Patch Transdermal every 24 hours  melatonin 3 milliGRAM(s) Oral <User Schedule>  mirtazapine 45 milliGRAM(s) Oral <User Schedule>  multivitamin/minerals 1 Tablet(s) Oral daily  pantoprazole   Suspension 40 milliGRAM(s) Oral once  QUEtiapine 50 milliGRAM(s) Oral <User Schedule>  sertraline 50 milliGRAM(s) Oral daily  sodium chloride 0.9% lock flush 20 milliLiter(s) IV Push once  sodium chloride 0.9%. 1000 milliLiter(s) (10 mL/Hr) IV Continuous <Continuous>  vancomycin  IVPB 500 milliGRAM(s) IV Intermittent every 24 hours    MEDICATIONS  (PRN):  acetaminophen    Suspension. 650 milliGRAM(s) Enteral Tube every 6 hours PRN Mild Pain (1 - 3)  nystatin Powder 1 Application(s) Topical two times a day PRN rash/itchiness  sodium chloride 0.9% lock flush 10 milliLiter(s) IV Push every 1 hour PRN After each medication administration  sodium chloride 0.9% lock flush 10 milliLiter(s) IV Push every 12 hours PRN Lumen of catheter NOT used  traMADol 50 milliGRAM(s) Oral every 6 hours PRN Severe Pain (7 - 10)      Smoking: [ ] yes  [x] no    AICD/PPM: [ ] yes   [x] no    Pertinent lab data:                        8.8    10.8  )-----------( 462      ( 23 Aug 2018 06:53 )             27.7     08-23    141  |  104  |  34<H>  ----------------------------<  148<H>  3.8   |  28  |  0.94    Ca    8.4      23 Aug 2018 06:53        < from: Transesophageal Echocardiogram (07.30.18 @ 16:42) >      Observations:  Mitral Valve: Tethered , mildly calcified mitral valve  leaflets with normal opening. Mild-moderate mitral  regurgitation.  Aortic Valve/Aorta: Sclerotic aortic valve leaflets with  normal opening. Mild aortic regurgitation.  Simple atheroma noted in aortic arch/descending aorta.  LeftAtrium: No left atrial or left atrial appendage  thrombus.  Left Ventricle: Severe global left ventricular systolic  dysfunction.  Right Heart: Normal right atrium. Right ventricular  enlargement with decreased right ventricular systolic  function. Thickened tricuspid valve. Mild-moderate  tricuspid regurgitation. Normal pulmonic valve. Mild  pulmonic regurgitation.  Pericardium/Pleura: Normal pericardium with no pericardial  effusion.  Hemodynamic: Estimated right atrial pressure is 8 mm Hg.  Estimated right ventricular systolic pressure equals 33 mm  Hg, assuming right atrial pressure equals 8 mm Hg,  consistent with normal pulmonary pressures. Color Doppler  demonstrates evidence of a patent foramen ovale.  ------------------------------------------------------------------------  Conclusions:  1. Tethered , mildly calcified mitral valve leaflets with  normal opening. Mild-moderate mitral regurgitation.  2. Sclerotic aortic valve leaflets with normal opening.  Mild aortic regurgitation.  3. Severe global left ventricular systolic dysfunction.  4. Right ventricular enlargement with decreased right  ventricular systolic function.  5. Thickened tricuspid valve. Mild-moderate tricuspid  regurgitation.  6. Color Doppler demonstrates evidence of a patent foramen  ovale.  7. No evidence of valvular vegetation is seen.  ------------------------------------------------------------------------        Physical Examination:      Daily   Vital Signs Last 24 Hrs  T(C): 37.2 (24 Aug 2018 10:21), Max: 37.2 (24 Aug 2018 10:21)  T(F): 98.9 (24 Aug 2018 10:21), Max: 98.9 (24 Aug 2018 10:21)  HR: 95 (24 Aug 2018 10:21) (95 - 102)  BP: 24/76 (24 Aug 2018 10:21) (24/76 - 148/78)  BP(mean): --  RR: 16 (24 Aug 2018 10:21) (16 - 18)  SpO2: 92% (24 Aug 2018 10:21) (92% - 97%)    Drug Dosing Weight  Height (cm): 147.32 (03 Aug 2018 06:47)  Weight (kg): 50.4 (03 Aug 2018 06:47)  BMI (kg/m2): 23.2 (03 Aug 2018 06:47)  BSA (m2): 1.42 (03 Aug 2018 06:47)    Constitutional: NAD  Neck:  No JVD  Respiratory: CTAB/L  Cardiovascular: S1 and S2  Gastrointestinal: BS+, soft, NT/ND  Extremities: No peripheral edema  : No Blackman  Skin: No rashes    Comments:    ASA Class: I [ ]  II [ ]  III [ ]  IV [x]    The patient is a suitable candidate for the planned procedure unless box checked [ ]  No, explain:

## 2018-08-24 NOTE — PROGRESS NOTE ADULT - ASSESSMENT
67 yo female with dementia, history of UC, and s/p RT Hip hemiarthroplasty 6/22/18  postsurgical hematoma with secondary infection  Admitted with low grade fever, rigors, increased confusion, found to have leukocytosis, 20% bands, ICU, pressor use   Bld Cxs 7/26-8/1 all  positive all MRSA  s/p R hip I&D, lavage, poly exchange- pus encountered, all specimens S aureus.Initial surgery 7/27, repeat debridement 8/4, now with spacer  Afebrile, Creat stable  Dapto and ceftaroline used for a short time,concerns of rash led to dapto and rifampin, fever led to switch to vanco as sole MRSA agent  GAYATHRI: no evidence of valvular vegetation  s/p return to OR, removal of hardware/spacer,  perhaps effective source control on 8/4  leukocytosis has resolved, etiology of recent  fevers not clear, have resolved  Candida in urine is likely a colonizer, no pressing need to treat it.  Meropenem use was empiric for possible GNR infection, although CXR has been clear, scant ET secretions, and urine has grown yeast on 8/9 and less than 10,000 on 8/12  Meropenem course completed 8/16  Prior rash has resolved  Plan:  1.continue vanco, day 20 of planned 42 day course  2.Monitor off fluconazole and meropenem  3.Vanco trough weekly  4.ID isues reviewed with  at bedside, he desires rehab  3.vanco on hold due to elevated level,,would restart  at a deduced dose when level less than 15  4.Supportive respiratory care per ICU ,tenuous status  5.vanco trough in 3 days   4.Disposition per SICU service

## 2018-08-24 NOTE — PROGRESS NOTE ADULT - SUBJECTIVE AND OBJECTIVE BOX
---___---___---___---___---___---___ ---___---___---___---___---___---___---___---___---___---                    <<<  M E D I C A L   A T T E N D I N G    F O L L O W    U P   N O T E  >>>    unchanged laying in bed awake no distress   ---___---___---___---___---___---      <<<  MEDICATIONS:  >>>    MEDICATIONS  (STANDING):  ALBUTerol/ipratropium for Nebulization 3 milliLiter(s) Nebulizer every 6 hours  ascorbic acid 500 milliGRAM(s) Oral daily  buDESOnide   0.5 milliGRAM(s) Respule 0.5 milliGRAM(s) Inhalation every 12 hours  chlorhexidine 4% Liquid 1 Application(s) Topical <User Schedule>  clonazePAM Tablet 2 milliGRAM(s) Oral at bedtime  enoxaparin Injectable 40 milliGRAM(s) SubCutaneous daily  famotidine    Tablet 20 milliGRAM(s) Oral daily  famotidine    Tablet 20 milliGRAM(s) Oral daily  FIRST- Mouthwash  BLM 5 milliLiter(s) Swish and Spit daily  insulin lispro (HumaLOG) corrective regimen sliding scale   SubCutaneous every 6 hours  lidocaine   Patch 1 Patch Transdermal every 24 hours  melatonin 3 milliGRAM(s) Oral <User Schedule>  mirtazapine 45 milliGRAM(s) Oral <User Schedule>  multivitamin/minerals 1 Tablet(s) Oral daily  pantoprazole   Suspension 40 milliGRAM(s) Oral once  QUEtiapine 50 milliGRAM(s) Oral <User Schedule>  sertraline 50 milliGRAM(s) Oral daily  sodium chloride 0.9% lock flush 20 milliLiter(s) IV Push once  sodium chloride 0.9%. 1000 milliLiter(s) (10 mL/Hr) IV Continuous <Continuous>  vancomycin  IVPB 500 milliGRAM(s) IV Intermittent every 24 hours      MEDICATIONS  (PRN):  acetaminophen    Suspension. 650 milliGRAM(s) Enteral Tube every 6 hours PRN Mild Pain (1 - 3)  nystatin Powder 1 Application(s) Topical two times a day PRN rash/itchiness  sodium chloride 0.9% lock flush 10 milliLiter(s) IV Push every 1 hour PRN After each medication administration  sodium chloride 0.9% lock flush 10 milliLiter(s) IV Push every 12 hours PRN Lumen of catheter NOT used  traMADol 50 milliGRAM(s) Oral every 6 hours PRN Severe Pain (7 - 10)       ---___---___---___---___---___---     <<<REVIEW OF SYSTEM: >>>    GEN: no fever, no chills, no pain  RESP: no SOB, no cough, no sputum  CVS: no chest pain, no palpitations, no edema  GI: no abdominal pain, no nausea, no vomiting, no constipation, no diarrhea  : no dysurea, no frequency  NEURO: no headache, no dizziness  PSYCH: no depression, not anxious  Derm : no itching, no rash     ---___---___---___---___---___---          <<<  VITAL SIGNS: >>>    T(F): 98 (08-24-18 @ 06:18), Max: 98.6 (08-23-18 @ 15:24)  HR: 100 (08-24-18 @ 06:18) (99 - 103)  BP: 133/63 (08-24-18 @ 06:18) (128/69 - 148/78)  RR: 18 (08-24-18 @ 06:18) (18 - 22)  SpO2: 92% (08-24-18 @ 06:18) (92% - 97%)  Wt(kg): --  CAPILLARY BLOOD GLUCOSE      POCT Blood Glucose.: 83 mg/dL (24 Aug 2018 06:11)    I&O's Summary    23 Aug 2018 07:01  -  24 Aug 2018 07:00  --------------------------------------------------------  IN: 360 mL / OUT: 300 mL / NET: 60 mL         ---___---___---___---___---___---                       PHYSICAL EXAM:    GEN: A&O X 2 , NAD , comfortable  HEENT: NCAT, PERRL, MMM, no scleral icterus, hearing intact ngt noted   NECK: Supple, No JVD  CVS: S1S2 , regular , No M/R/G appreciated  PULM: CTA B/L,  no W/R/R appreciated  ABD.: soft. non tender, non distended,  bowel sounds present  Extrem: intact pulses , no edema noted dressing to the right hip noted  Derm: No rash or ecchymosis noted  PSYCH: normal mood, no depression, not anxious     ---___---___---___---___---___---     <<<  LAB AND IMAGING: >>>                          8.8    10.8  )-----------( 462      ( 23 Aug 2018 06:53 )             27.7               08-23    141  |  104  |  34<H>  ----------------------------<  148<H>  3.8   |  28  |  0.94    Ca    8.4      23 Aug 2018 06:53                                 [All pertinent / recent available Imaging reports and other labs reviewed]     ---___---___---___---___---___---___ ---___---___---___---___---           <<<  A S S E S S M E N T   A N D   P L A N :  >>>          -GI/DVT Prophylaxis.    --------------------------------------------       >>______________________<<      Deniz Bolden .         phone   [phone]

## 2018-08-24 NOTE — PROVIDER CONTACT NOTE (OTHER) - RECOMMENDATIONS
Notify provider. See if he can draw vanco trough through arterial stick d/t pt being an extremely hard stick

## 2018-08-25 LAB
ANION GAP SERPL CALC-SCNC: 10 MMOL/L — SIGNIFICANT CHANGE UP (ref 5–17)
ANION GAP SERPL CALC-SCNC: 14 MMOL/L — SIGNIFICANT CHANGE UP (ref 5–17)
BUN SERPL-MCNC: 28 MG/DL — HIGH (ref 7–23)
BUN SERPL-MCNC: 29 MG/DL — HIGH (ref 7–23)
CALCIUM SERPL-MCNC: 8.4 MG/DL — SIGNIFICANT CHANGE UP (ref 8.4–10.5)
CALCIUM SERPL-MCNC: 8.6 MG/DL — SIGNIFICANT CHANGE UP (ref 8.4–10.5)
CHLORIDE SERPL-SCNC: 105 MMOL/L — SIGNIFICANT CHANGE UP (ref 96–108)
CHLORIDE SERPL-SCNC: 106 MMOL/L — SIGNIFICANT CHANGE UP (ref 96–108)
CO2 SERPL-SCNC: 23 MMOL/L — SIGNIFICANT CHANGE UP (ref 22–31)
CO2 SERPL-SCNC: 24 MMOL/L — SIGNIFICANT CHANGE UP (ref 22–31)
CREAT SERPL-MCNC: 0.91 MG/DL — SIGNIFICANT CHANGE UP (ref 0.5–1.3)
CREAT SERPL-MCNC: 0.95 MG/DL — SIGNIFICANT CHANGE UP (ref 0.5–1.3)
CULTURE RESULTS: SIGNIFICANT CHANGE UP
CULTURE RESULTS: SIGNIFICANT CHANGE UP
GLUCOSE SERPL-MCNC: 141 MG/DL — HIGH (ref 70–99)
GLUCOSE SERPL-MCNC: 195 MG/DL — HIGH (ref 70–99)
HCT VFR BLD CALC: 28.1 % — LOW (ref 34.5–45)
HCT VFR BLD CALC: 31.4 % — LOW (ref 34.5–45)
HGB BLD-MCNC: 10 G/DL — LOW (ref 11.5–15.5)
HGB BLD-MCNC: 9.2 G/DL — LOW (ref 11.5–15.5)
MCHC RBC-ENTMCNC: 29 PG — SIGNIFICANT CHANGE UP (ref 27–34)
MCHC RBC-ENTMCNC: 29.7 PG — SIGNIFICANT CHANGE UP (ref 27–34)
MCHC RBC-ENTMCNC: 31.9 GM/DL — LOW (ref 32–36)
MCHC RBC-ENTMCNC: 32.6 GM/DL — SIGNIFICANT CHANGE UP (ref 32–36)
MCV RBC AUTO: 90.9 FL — SIGNIFICANT CHANGE UP (ref 80–100)
MCV RBC AUTO: 91 FL — SIGNIFICANT CHANGE UP (ref 80–100)
PLATELET # BLD AUTO: 312 K/UL — SIGNIFICANT CHANGE UP (ref 150–400)
PLATELET # BLD AUTO: 399 K/UL — SIGNIFICANT CHANGE UP (ref 150–400)
POTASSIUM SERPL-MCNC: 3.4 MMOL/L — LOW (ref 3.5–5.3)
POTASSIUM SERPL-MCNC: 3.8 MMOL/L — SIGNIFICANT CHANGE UP (ref 3.5–5.3)
POTASSIUM SERPL-SCNC: 3.4 MMOL/L — LOW (ref 3.5–5.3)
POTASSIUM SERPL-SCNC: 3.8 MMOL/L — SIGNIFICANT CHANGE UP (ref 3.5–5.3)
RBC # BLD: 3.09 M/UL — LOW (ref 3.8–5.2)
RBC # BLD: 3.45 M/UL — LOW (ref 3.8–5.2)
RBC # FLD: 14.3 % — SIGNIFICANT CHANGE UP (ref 10.3–14.5)
RBC # FLD: 14.3 % — SIGNIFICANT CHANGE UP (ref 10.3–14.5)
SODIUM SERPL-SCNC: 139 MMOL/L — SIGNIFICANT CHANGE UP (ref 135–145)
SODIUM SERPL-SCNC: 143 MMOL/L — SIGNIFICANT CHANGE UP (ref 135–145)
SPECIMEN SOURCE: SIGNIFICANT CHANGE UP
SPECIMEN SOURCE: SIGNIFICANT CHANGE UP
VANCOMYCIN TROUGH SERPL-MCNC: 15.7 UG/ML — SIGNIFICANT CHANGE UP (ref 10–20)
WBC # BLD: 12.9 K/UL — HIGH (ref 3.8–10.5)
WBC # BLD: 14.3 K/UL — HIGH (ref 3.8–10.5)
WBC # FLD AUTO: 12.9 K/UL — HIGH (ref 3.8–10.5)
WBC # FLD AUTO: 14.3 K/UL — HIGH (ref 3.8–10.5)

## 2018-08-25 RX ORDER — CLONAZEPAM 1 MG
1 TABLET ORAL DAILY
Qty: 0 | Refills: 0 | Status: DISCONTINUED | OUTPATIENT
Start: 2018-08-25 | End: 2018-08-28

## 2018-08-25 RX ORDER — ACETAMINOPHEN 500 MG
650 TABLET ORAL EVERY 6 HOURS
Qty: 0 | Refills: 0 | Status: DISCONTINUED | OUTPATIENT
Start: 2018-08-25 | End: 2018-08-28

## 2018-08-25 RX ORDER — POTASSIUM CHLORIDE 20 MEQ
40 PACKET (EA) ORAL ONCE
Qty: 0 | Refills: 0 | Status: COMPLETED | OUTPATIENT
Start: 2018-08-25 | End: 2018-08-25

## 2018-08-25 RX ADMIN — Medication 3 MILLILITER(S): at 23:35

## 2018-08-25 RX ADMIN — Medication 3 MILLIGRAM(S): at 22:01

## 2018-08-25 RX ADMIN — Medication 3 MILLILITER(S): at 00:16

## 2018-08-25 RX ADMIN — LIDOCAINE 1 PATCH: 4 CREAM TOPICAL at 22:00

## 2018-08-25 RX ADMIN — Medication 0.5 MILLIGRAM(S): at 22:01

## 2018-08-25 RX ADMIN — Medication 650 MILLIGRAM(S): at 12:16

## 2018-08-25 RX ADMIN — Medication 3 MILLILITER(S): at 18:05

## 2018-08-25 RX ADMIN — ENOXAPARIN SODIUM 40 MILLIGRAM(S): 100 INJECTION SUBCUTANEOUS at 11:40

## 2018-08-25 RX ADMIN — Medication 0.5 MILLIGRAM(S): at 10:41

## 2018-08-25 RX ADMIN — Medication 40 MILLIEQUIVALENT(S): at 18:05

## 2018-08-25 RX ADMIN — Medication 1 TABLET(S): at 11:42

## 2018-08-25 RX ADMIN — LIDOCAINE 1 PATCH: 4 CREAM TOPICAL at 10:40

## 2018-08-25 RX ADMIN — FAMOTIDINE 20 MILLIGRAM(S): 10 INJECTION INTRAVENOUS at 11:41

## 2018-08-25 RX ADMIN — Medication 2 MILLIGRAM(S): at 22:00

## 2018-08-25 RX ADMIN — SODIUM CHLORIDE 75 MILLILITER(S): 9 INJECTION, SOLUTION INTRAVENOUS at 10:42

## 2018-08-25 RX ADMIN — Medication 650 MILLIGRAM(S): at 13:00

## 2018-08-25 RX ADMIN — QUETIAPINE FUMARATE 50 MILLIGRAM(S): 200 TABLET, FILM COATED ORAL at 22:02

## 2018-08-25 RX ADMIN — MIRTAZAPINE 45 MILLIGRAM(S): 45 TABLET, ORALLY DISINTEGRATING ORAL at 22:01

## 2018-08-25 RX ADMIN — Medication 100 MILLIGRAM(S): at 06:32

## 2018-08-25 RX ADMIN — CHLORHEXIDINE GLUCONATE 1 APPLICATION(S): 213 SOLUTION TOPICAL at 06:39

## 2018-08-25 RX ADMIN — SERTRALINE 50 MILLIGRAM(S): 25 TABLET, FILM COATED ORAL at 11:42

## 2018-08-25 RX ADMIN — NYSTATIN CREAM 1 APPLICATION(S): 100000 CREAM TOPICAL at 11:49

## 2018-08-25 RX ADMIN — DIPHENHYDRAMINE HYDROCHLORIDE AND LIDOCAINE HYDROCHLORIDE AND ALUMINUM HYDROXIDE AND MAGNESIUM HYDRO 5 MILLILITER(S): KIT at 11:42

## 2018-08-25 RX ADMIN — Medication 3 MILLILITER(S): at 06:32

## 2018-08-25 RX ADMIN — Medication 650 MILLIGRAM(S): at 19:03

## 2018-08-25 RX ADMIN — Medication 3 MILLILITER(S): at 11:40

## 2018-08-25 NOTE — PROGRESS NOTE ADULT - SUBJECTIVE AND OBJECTIVE BOX
---___---___---___---___---___---___ ---___---___---___---___---___---___---___---___---___---                    <<<  M E D I C A L   A T T E N D I N G    F O L L O W    U P   N O T E  >>>    sp peg placement . also daughter states that she gets sundowning at 6 pm   otherwise she has been doing fine      ---___---___---___---___---___---      <<<  MEDICATIONS:  >>>    MEDICATIONS  (STANDING):  ALBUTerol/ipratropium for Nebulization 3 milliLiter(s) Nebulizer every 6 hours  buDESOnide   0.5 milliGRAM(s) Respule 0.5 milliGRAM(s) Inhalation every 12 hours  chlorhexidine 4% Liquid 1 Application(s) Topical <User Schedule>  clonazePAM Tablet 2 milliGRAM(s) Oral at bedtime  dextrose 5% + lactated ringers. 1000 milliLiter(s) (75 mL/Hr) IV Continuous <Continuous>  enoxaparin Injectable 40 milliGRAM(s) SubCutaneous daily  famotidine    Tablet 20 milliGRAM(s) Oral daily  famotidine    Tablet 20 milliGRAM(s) Oral daily  FIRST- Mouthwash  BLM 5 milliLiter(s) Swish and Spit daily  insulin lispro (HumaLOG) corrective regimen sliding scale   SubCutaneous every 6 hours  lidocaine   Patch 1 Patch Transdermal every 24 hours  melatonin 3 milliGRAM(s) Oral <User Schedule>  mirtazapine 45 milliGRAM(s) Oral <User Schedule>  multivitamin/minerals 1 Tablet(s) Oral daily  pantoprazole   Suspension 40 milliGRAM(s) Oral once  potassium chloride   Solution 40 milliEquivalent(s) Enteral Tube once  QUEtiapine 50 milliGRAM(s) Oral <User Schedule>  sertraline 50 milliGRAM(s) Oral daily  sodium chloride 0.9% lock flush 20 milliLiter(s) IV Push once  vancomycin  IVPB 500 milliGRAM(s) IV Intermittent every 24 hours      MEDICATIONS  (PRN):  acetaminophen    Suspension. 650 milliGRAM(s) Enteral Tube every 6 hours PRN Mild Pain (1 - 3)  nystatin Powder 1 Application(s) Topical two times a day PRN rash/itchiness  sodium chloride 0.9% lock flush 10 milliLiter(s) IV Push every 1 hour PRN After each medication administration  sodium chloride 0.9% lock flush 10 milliLiter(s) IV Push every 12 hours PRN Lumen of catheter NOT used  traMADol 50 milliGRAM(s) Oral every 6 hours PRN Severe Pain (7 - 10)       ---___---___---___---___---___---     <<<REVIEW OF SYSTEM: >>>    GEN: no fever, no chills, no pain  RESP: no SOB, no cough, no sputum  CVS: no chest pain, no palpitations, no edema  GI: no abdominal pain, no nausea, no vomiting, no constipation, no diarrhea  : no dysurea, no frequency  NEURO: no headache, no dizziness  PSYCH: no depression, not anxious  Derm : no itching, no rash     ---___---___---___---___---___---          <<<  VITAL SIGNS: >>>    T(F): 98.7 (08-25-18 @ 13:18), Max: 98.7 (08-25-18 @ 10:20)  HR: 87 (08-25-18 @ 13:18) (87 - 110)  BP: 115/60 (08-25-18 @ 13:18) (107/66 - 160/80)  RR: 18 (08-25-18 @ 13:18) (16 - 18)  SpO2: 97% (08-25-18 @ 13:18) (96% - 99%)  Wt(kg): --  CAPILLARY BLOOD GLUCOSE      POCT Blood Glucose.: 88 mg/dL (25 Aug 2018 12:00)    I&O's Summary    24 Aug 2018 07:01  -  25 Aug 2018 07:00  --------------------------------------------------------  IN: 0 mL / OUT: 0 mL / NET: 0 mL    25 Aug 2018 07:01  -  25 Aug 2018 15:37  --------------------------------------------------------  IN: 570 mL / OUT: 400 mL / NET: 170 mL         ---___---___---___---___---___---                       PHYSICAL EXAM:    GEN: A&O X 2 , NAD , comfortable  HEENT: NCAT, PERRL, MMM, no scleral icterus, hearing intact  NECK: Supple, No JVD  CVS: S1S2 , regular , No M/R/G appreciated  PULM: CTA B/L,  no W/R/R appreciated  ABD.: soft. non tender, non distended,  bowel sounds present peg noted  Extrem: intact pulses , no edema noted  Derm: No rash or ecchymosis noted  PSYCH: normal mood, no depression, not anxious     ---___---___---___---___---___---     <<<  LAB AND IMAGING: >>>                          9.2    14.3  )-----------( 312      ( 25 Aug 2018 14:28 )             28.1               08-25    139  |  105  |  28<H>  ----------------------------<  195<H>  3.4<L>   |  24  |  0.91    Ca    8.4      25 Aug 2018 14:28                                 [All pertinent / recent available Imaging reports and other labs reviewed]     ---___---___---___---___---___---___ ---___---___---___---___---           <<<  A S S E S S M E N T   A N D   P L A N :  >>>          -GI/DVT Prophylaxis.    --------------------------------------------      >>______________________<<      Deniz Bolden .         phone   [phone]

## 2018-08-25 NOTE — PROGRESS NOTE ADULT - SUBJECTIVE AND OBJECTIVE BOX
INTERVAL HPI/OVERNIGHT EVENTS: stable overnight after PEG, site clean, to start feeds today    MEDICATIONS  (STANDING):  ALBUTerol/ipratropium for Nebulization 3 milliLiter(s) Nebulizer every 6 hours  buDESOnide   0.5 milliGRAM(s) Respule 0.5 milliGRAM(s) Inhalation every 12 hours  chlorhexidine 4% Liquid 1 Application(s) Topical <User Schedule>  clonazePAM Tablet 2 milliGRAM(s) Oral at bedtime  dextrose 5% + lactated ringers. 1000 milliLiter(s) (75 mL/Hr) IV Continuous <Continuous>  enoxaparin Injectable 40 milliGRAM(s) SubCutaneous daily  famotidine    Tablet 20 milliGRAM(s) Oral daily  famotidine    Tablet 20 milliGRAM(s) Oral daily  FIRST- Mouthwash  BLM 5 milliLiter(s) Swish and Spit daily  insulin lispro (HumaLOG) corrective regimen sliding scale   SubCutaneous every 6 hours  lidocaine   Patch 1 Patch Transdermal every 24 hours  melatonin 3 milliGRAM(s) Oral <User Schedule>  mirtazapine 45 milliGRAM(s) Oral <User Schedule>  multivitamin/minerals 1 Tablet(s) Oral daily  pantoprazole   Suspension 40 milliGRAM(s) Oral once  QUEtiapine 50 milliGRAM(s) Oral <User Schedule>  sertraline 50 milliGRAM(s) Oral daily  sodium chloride 0.9% lock flush 20 milliLiter(s) IV Push once  vancomycin  IVPB 500 milliGRAM(s) IV Intermittent every 24 hours    MEDICATIONS  (PRN):  acetaminophen    Suspension. 650 milliGRAM(s) Enteral Tube every 6 hours PRN Mild Pain (1 - 3)  nystatin Powder 1 Application(s) Topical two times a day PRN rash/itchiness  sodium chloride 0.9% lock flush 10 milliLiter(s) IV Push every 1 hour PRN After each medication administration  sodium chloride 0.9% lock flush 10 milliLiter(s) IV Push every 12 hours PRN Lumen of catheter NOT used  traMADol 50 milliGRAM(s) Oral every 6 hours PRN Severe Pain (7 - 10)      Allergies    ASA; dye contrast (Anaphylaxis)  aspirin (Short breath)  divalproex sodium (Other (Unknown))  Haldol (Other (Unknown))  penicillin (Short breath; Rash)  sulfa drugs (Short breath; Rash)  Xanax (Other (Unknown))    Intolerances            PHYSICAL EXAM:   Vital Signs:  Vital Signs Last 24 Hrs  T(C): 36.6 (25 Aug 2018 06:42), Max: 37 (24 Aug 2018 20:40)  T(F): 97.8 (25 Aug 2018 06:42), Max: 98.6 (24 Aug 2018 20:40)  HR: 102 (25 Aug 2018 06:42) (88 - 110)  BP: 125/76 (25 Aug 2018 06:42) (107/66 - 160/80)  BP(mean): --  RR: 16 (25 Aug 2018 06:42) (16 - 18)  SpO2: 98% (25 Aug 2018 06:42) (93% - 99%)  Daily     Daily     GENERAL:  no distress  HEENT:  NC/AT,  anicteric  CHEST:   no increased effort, breath sounds clear  HEART:  Regular rhythm  ABDOMEN:  Soft, non-tender, non-distended, normoactive bowel sounds,  peg site clean and non tender  EXTEREMITIES:  no cyanosis      LABS:                RADIOLOGY & ADDITIONAL TESTS:

## 2018-08-26 LAB
ANION GAP SERPL CALC-SCNC: 13 MMOL/L — SIGNIFICANT CHANGE UP (ref 5–17)
BUN SERPL-MCNC: 27 MG/DL — HIGH (ref 7–23)
CALCIUM SERPL-MCNC: 8.5 MG/DL — SIGNIFICANT CHANGE UP (ref 8.4–10.5)
CHLORIDE SERPL-SCNC: 105 MMOL/L — SIGNIFICANT CHANGE UP (ref 96–108)
CO2 SERPL-SCNC: 26 MMOL/L — SIGNIFICANT CHANGE UP (ref 22–31)
CREAT SERPL-MCNC: 0.96 MG/DL — SIGNIFICANT CHANGE UP (ref 0.5–1.3)
GLUCOSE SERPL-MCNC: 145 MG/DL — HIGH (ref 70–99)
HCT VFR BLD CALC: 28.9 % — LOW (ref 34.5–45)
HGB BLD-MCNC: 9.2 G/DL — LOW (ref 11.5–15.5)
MCHC RBC-ENTMCNC: 29 PG — SIGNIFICANT CHANGE UP (ref 27–34)
MCHC RBC-ENTMCNC: 31.8 GM/DL — LOW (ref 32–36)
MCV RBC AUTO: 91.2 FL — SIGNIFICANT CHANGE UP (ref 80–100)
PLATELET # BLD AUTO: 314 K/UL — SIGNIFICANT CHANGE UP (ref 150–400)
POTASSIUM SERPL-MCNC: 3.7 MMOL/L — SIGNIFICANT CHANGE UP (ref 3.5–5.3)
POTASSIUM SERPL-SCNC: 3.7 MMOL/L — SIGNIFICANT CHANGE UP (ref 3.5–5.3)
RBC # BLD: 3.17 M/UL — LOW (ref 3.8–5.2)
RBC # FLD: 14.4 % — SIGNIFICANT CHANGE UP (ref 10.3–14.5)
SODIUM SERPL-SCNC: 144 MMOL/L — SIGNIFICANT CHANGE UP (ref 135–145)
WBC # BLD: 11.4 K/UL — HIGH (ref 3.8–10.5)
WBC # FLD AUTO: 11.4 K/UL — HIGH (ref 3.8–10.5)

## 2018-08-26 PROCEDURE — 73110 X-RAY EXAM OF WRIST: CPT | Mod: 26,RT

## 2018-08-26 PROCEDURE — 74018 RADEX ABDOMEN 1 VIEW: CPT | Mod: 26

## 2018-08-26 PROCEDURE — 71045 X-RAY EXAM CHEST 1 VIEW: CPT | Mod: 26

## 2018-08-26 RX ORDER — DIPHENHYDRAMINE HCL 50 MG
50 CAPSULE ORAL EVERY 4 HOURS
Qty: 0 | Refills: 0 | Status: DISCONTINUED | OUTPATIENT
Start: 2018-08-26 | End: 2018-08-26

## 2018-08-26 RX ORDER — DIPHENHYDRAMINE HCL 50 MG
25 CAPSULE ORAL EVERY 6 HOURS
Qty: 0 | Refills: 0 | Status: DISCONTINUED | OUTPATIENT
Start: 2018-08-26 | End: 2018-08-28

## 2018-08-26 RX ORDER — ACETAMINOPHEN 500 MG
1000 TABLET ORAL ONCE
Qty: 0 | Refills: 0 | Status: COMPLETED | OUTPATIENT
Start: 2018-08-26 | End: 2018-08-26

## 2018-08-26 RX ADMIN — ENOXAPARIN SODIUM 40 MILLIGRAM(S): 100 INJECTION SUBCUTANEOUS at 13:45

## 2018-08-26 RX ADMIN — Medication 1000 MILLIGRAM(S): at 19:15

## 2018-08-26 RX ADMIN — CHLORHEXIDINE GLUCONATE 1 APPLICATION(S): 213 SOLUTION TOPICAL at 06:43

## 2018-08-26 RX ADMIN — Medication 25 MILLIGRAM(S): at 15:52

## 2018-08-26 RX ADMIN — Medication 3 MILLILITER(S): at 06:44

## 2018-08-26 RX ADMIN — Medication 3 MILLILITER(S): at 13:45

## 2018-08-26 RX ADMIN — Medication 0.5 MILLIGRAM(S): at 13:45

## 2018-08-26 RX ADMIN — Medication 400 MILLIGRAM(S): at 18:45

## 2018-08-26 RX ADMIN — LIDOCAINE 1 PATCH: 4 CREAM TOPICAL at 10:00

## 2018-08-26 RX ADMIN — Medication 2: at 01:23

## 2018-08-26 RX ADMIN — Medication 100 MILLIGRAM(S): at 06:45

## 2018-08-26 NOTE — PROVIDER CONTACT NOTE (OTHER) - ASSESSMENT
Pt VSS, tachy up to 112, baseline for patient, pt visibly shaking stating that her stomach hurts, residual from PEG tube over 60 mL of green fluid

## 2018-08-26 NOTE — PROGRESS NOTE ADULT - SUBJECTIVE AND OBJECTIVE BOX
Pt S/E at bedside, no acute events overnight, pain controlled. Sutures d/c'd yesterday. Pt tolerated well. GI started tube feeds through PEG tube. Pt had increased residuals and tube feed rate was decreased.    Vital Signs Last 24 Hrs  T(C): 37.2 (26 Aug 2018 07:40), Max: 38.2 (25 Aug 2018 18:23)  T(F): 99 (26 Aug 2018 07:40), Max: 100.8 (25 Aug 2018 18:23)  HR: 112 (26 Aug 2018 07:40) (87 - 114)  BP: 144/77 (26 Aug 2018 07:40) (111/54 - 158/85)  BP(mean): --  RR: 20 (26 Aug 2018 07:40) (16 - 20)  SpO2: 96% (26 Aug 2018 07:40) (95% - 100%)    Gen: NAD    Right Lower Extremity:  Incision well appearing, no erythema/drainage  +EHL/FHL/TA/GS  SILT L3-S1  +DP/PT Pulses  Compartments soft  No calf TTP B/L

## 2018-08-26 NOTE — PROGRESS NOTE ADULT - SUBJECTIVE AND OBJECTIVE BOX
INTERVAL HPI/OVERNIGHT EVENTS: Having pain at PEG site, tube feed held for residuals, XRay done this am, low grade temp yesterday    MEDICATIONS  (STANDING):  ALBUTerol/ipratropium for Nebulization 3 milliLiter(s) Nebulizer every 6 hours  buDESOnide   0.5 milliGRAM(s) Respule 0.5 milliGRAM(s) Inhalation every 12 hours  chlorhexidine 4% Liquid 1 Application(s) Topical <User Schedule>  clonazePAM Tablet 2 milliGRAM(s) Oral at bedtime  dextrose 5% + lactated ringers. 1000 milliLiter(s) (75 mL/Hr) IV Continuous <Continuous>  enoxaparin Injectable 40 milliGRAM(s) SubCutaneous daily  famotidine    Tablet 20 milliGRAM(s) Oral daily  FIRST- Mouthwash  BLM 5 milliLiter(s) Swish and Spit daily  insulin lispro (HumaLOG) corrective regimen sliding scale   SubCutaneous every 6 hours  lidocaine   Patch 1 Patch Transdermal every 24 hours  melatonin 3 milliGRAM(s) Oral <User Schedule>  mirtazapine 45 milliGRAM(s) Oral <User Schedule>  multivitamin/minerals 1 Tablet(s) Oral daily  pantoprazole   Suspension 40 milliGRAM(s) Oral once  QUEtiapine 50 milliGRAM(s) Oral <User Schedule>  sertraline 50 milliGRAM(s) Oral daily  sodium chloride 0.9% lock flush 20 milliLiter(s) IV Push once  vancomycin  IVPB 500 milliGRAM(s) IV Intermittent every 24 hours    MEDICATIONS  (PRN):  acetaminophen    Suspension 650 milliGRAM(s) Enteral Tube every 6 hours PRN For Temp greater than 38 C (100.4 F)  acetaminophen    Suspension. 650 milliGRAM(s) Enteral Tube every 6 hours PRN Mild Pain (1 - 3)  clonazePAM Tablet 1 milliGRAM(s) Oral daily PRN agitation  nystatin Powder 1 Application(s) Topical two times a day PRN rash/itchiness  sodium chloride 0.9% lock flush 10 milliLiter(s) IV Push every 1 hour PRN After each medication administration  sodium chloride 0.9% lock flush 10 milliLiter(s) IV Push every 12 hours PRN Lumen of catheter NOT used  traMADol 50 milliGRAM(s) Oral every 6 hours PRN Severe Pain (7 - 10)      Allergies    ASA; dye contrast (Anaphylaxis)  aspirin (Short breath)  divalproex sodium (Other (Unknown))  Haldol (Other (Unknown))  penicillin (Short breath; Rash)  sulfa drugs (Short breath; Rash)  Xanax (Other (Unknown))    Intolerances            PHYSICAL EXAM:   Vital Signs:  Vital Signs Last 24 Hrs  T(C): 37.2 (26 Aug 2018 07:40), Max: 38.2 (25 Aug 2018 18:23)  T(F): 99 (26 Aug 2018 07:40), Max: 100.8 (25 Aug 2018 18:23)  HR: 112 (26 Aug 2018 07:40) (87 - 114)  BP: 144/77 (26 Aug 2018 07:40) (111/54 - 158/85)  BP(mean): --  RR: 20 (26 Aug 2018 07:40) (16 - 20)  SpO2: 96% (26 Aug 2018 07:40) (95% - 100%)  Daily     Daily     GENERAL:  no distress  HEENT:  NC/AT,  anicteric  CHEST:   no increased effort, breath sounds clear  HEART:  Regular rhythm  ABDOMEN:  Soft, PEG site and epigastrium is tender but site looks clean and peg in good position externally, non-distended,         LABS:                        9.2    11.4  )-----------( 314      ( 26 Aug 2018 06:44 )             28.9     08-26    144  |  105  |  27<H>  ----------------------------<  145<H>  3.7   |  26  |  0.96    Ca    8.5      26 Aug 2018 06:44            RADIOLOGY & ADDITIONAL TESTS:

## 2018-08-26 NOTE — PROGRESS NOTE ADULT - SUBJECTIVE AND OBJECTIVE BOX
---___---___---___---___---___---___ ---___---___---___---___---___---___---___---___---___---                    <<<  M E D I C A L   A T T E N D I N G    F O L L O W    U P   N O T E  >>>    remains unchanged     ---___---___---___---___---___---      <<<  MEDICATIONS:  >>>    MEDICATIONS  (STANDING):  ALBUTerol/ipratropium for Nebulization 3 milliLiter(s) Nebulizer every 6 hours  buDESOnide   0.5 milliGRAM(s) Respule 0.5 milliGRAM(s) Inhalation every 12 hours  chlorhexidine 4% Liquid 1 Application(s) Topical <User Schedule>  clonazePAM Tablet 2 milliGRAM(s) Oral at bedtime  dextrose 5% + lactated ringers. 1000 milliLiter(s) (75 mL/Hr) IV Continuous <Continuous>  enoxaparin Injectable 40 milliGRAM(s) SubCutaneous daily  famotidine    Tablet 20 milliGRAM(s) Oral daily  FIRST- Mouthwash  BLM 5 milliLiter(s) Swish and Spit daily  insulin lispro (HumaLOG) corrective regimen sliding scale   SubCutaneous every 6 hours  lidocaine   Patch 1 Patch Transdermal every 24 hours  melatonin 3 milliGRAM(s) Oral <User Schedule>  mirtazapine 45 milliGRAM(s) Oral <User Schedule>  multivitamin/minerals 1 Tablet(s) Oral daily  pantoprazole   Suspension 40 milliGRAM(s) Oral once  QUEtiapine 50 milliGRAM(s) Oral <User Schedule>  sertraline 50 milliGRAM(s) Oral daily  sodium chloride 0.9% lock flush 20 milliLiter(s) IV Push once  vancomycin  IVPB 500 milliGRAM(s) IV Intermittent every 24 hours      MEDICATIONS  (PRN):  acetaminophen    Suspension 650 milliGRAM(s) Enteral Tube every 6 hours PRN For Temp greater than 38 C (100.4 F)  acetaminophen    Suspension. 650 milliGRAM(s) Enteral Tube every 6 hours PRN Mild Pain (1 - 3)  clonazePAM Tablet 1 milliGRAM(s) Oral daily PRN agitation  nystatin Powder 1 Application(s) Topical two times a day PRN rash/itchiness  sodium chloride 0.9% lock flush 10 milliLiter(s) IV Push every 1 hour PRN After each medication administration  sodium chloride 0.9% lock flush 10 milliLiter(s) IV Push every 12 hours PRN Lumen of catheter NOT used  traMADol 50 milliGRAM(s) Oral every 6 hours PRN Severe Pain (7 - 10)       ---___---___---___---___---___---     <<<REVIEW OF SYSTEM: >>>    GEN: no fever, no chills,  pain  RESP: no SOB, no cough, no sputum  CVS: no chest pain, no palpitations, no edema  GI: no abdominal pain, no nausea, no vomiting, no constipation, no diarrhea  : no dysurea, no frequency  NEURO: no headache, no dizziness  PSYCH: no depression, not anxious  Derm : no itching, no rash     ---___---___---___---___---___---          <<<  VITAL SIGNS: >>>    T(F): 99.4 (08-26-18 @ 01:00), Max: 100.8 (08-25-18 @ 18:23)  HR: 105 (08-26-18 @ 01:00) (87 - 112)  BP: 111/54 (08-26-18 @ 01:00) (111/54 - 157/80)  RR: 18 (08-26-18 @ 01:00) (16 - 18)  SpO2: 98% (08-26-18 @ 01:00) (97% - 100%)  Wt(kg): --  CAPILLARY BLOOD GLUCOSE      POCT Blood Glucose.: 148 mg/dL (26 Aug 2018 06:20)    I&O's Summary    25 Aug 2018 07:01  -  26 Aug 2018 07:00  --------------------------------------------------------  IN: 1090 mL / OUT: 400 mL / NET: 690 mL         ---___---___---___---___---___---                       PHYSICAL EXAM:    GEN: A&O X 3 , NAD , comfortable  HEENT: NCAT, PERRL, MMM, no scleral icterus, hearing intact  NECK: Supple, No JVD  CVS: S1S2 , regular , No M/R/G appreciated  PULM: CTA B/L,  no W/R/R appreciated  ABD.: soft. non tender, non distended,  bowel sounds present peg noted   Extrem: intact pulses , no edema noted  Derm: No rash or ecchymosis noted  PSYCH: normal mood, no depression, not anxious     ---___---___---___---___---___---     <<<  LAB AND IMAGING: >>>                          9.2    14.3  )-----------( 312      ( 25 Aug 2018 14:28 )             28.1               08-25    139  |  105  |  28<H>  ----------------------------<  195<H>  3.4<L>   |  24  |  0.91    Ca    8.4      25 Aug 2018 14:28                                 [All pertinent / recent available Imaging reports and other labs reviewed]     ---___---___---___---___---___---___ ---___---___---___---___---           <<<  A S S E S S M E N T   A N D   P L A N :  >>>          -GI/DVT Prophylaxis.    --------------------------------------------     >>______________________<<      Deniz Bolden .         phone   [phone]

## 2018-08-26 NOTE — PROGRESS NOTE ADULT - ASSESSMENT
Agree with holding feeds, pain maybe from peg placement itself, peg study tomorrow to confirm position, may need CT if as ongoing pain and fever

## 2018-08-26 NOTE — PROGRESS NOTE ADULT - ASSESSMENT
68F s/p R hip PJI explant and placement of cement spacer with distal femur ORIF  Pain control  NWB RLE, FFWB LLE  PT/OT/OOB  Incentive spirometry  DVT ppx  Tube feeds to be managed by GI  Dispo planning

## 2018-08-26 NOTE — PROGRESS NOTE ADULT - ASSESSMENT
weakness  mrsa sepsis   anxiety   dementia  iron deficiency     xray results of the right wrist pending  patient to continue abx through picc  peg doing well  anxiety controlled through medication   will monitor cbc to see if wbc rising. if not rising . and patient tolerating feeds  dc planning should occur.

## 2018-08-27 LAB
ANION GAP SERPL CALC-SCNC: 11 MMOL/L — SIGNIFICANT CHANGE UP (ref 5–17)
BUN SERPL-MCNC: 23 MG/DL — SIGNIFICANT CHANGE UP (ref 7–23)
CALCIUM SERPL-MCNC: 8.1 MG/DL — LOW (ref 8.4–10.5)
CHLORIDE SERPL-SCNC: 108 MMOL/L — SIGNIFICANT CHANGE UP (ref 96–108)
CO2 SERPL-SCNC: 26 MMOL/L — SIGNIFICANT CHANGE UP (ref 22–31)
CREAT SERPL-MCNC: 1.04 MG/DL — SIGNIFICANT CHANGE UP (ref 0.5–1.3)
GLUCOSE SERPL-MCNC: 97 MG/DL — SIGNIFICANT CHANGE UP (ref 70–99)
HCT VFR BLD CALC: 28.1 % — LOW (ref 34.5–45)
HGB BLD-MCNC: 9 G/DL — LOW (ref 11.5–15.5)
MCHC RBC-ENTMCNC: 29.2 PG — SIGNIFICANT CHANGE UP (ref 27–34)
MCHC RBC-ENTMCNC: 32.1 GM/DL — SIGNIFICANT CHANGE UP (ref 32–36)
MCV RBC AUTO: 90.9 FL — SIGNIFICANT CHANGE UP (ref 80–100)
PLATELET # BLD AUTO: 332 K/UL — SIGNIFICANT CHANGE UP (ref 150–400)
POTASSIUM SERPL-MCNC: 3.9 MMOL/L — SIGNIFICANT CHANGE UP (ref 3.5–5.3)
POTASSIUM SERPL-SCNC: 3.9 MMOL/L — SIGNIFICANT CHANGE UP (ref 3.5–5.3)
RBC # BLD: 3.09 M/UL — LOW (ref 3.8–5.2)
RBC # FLD: 14.1 % — SIGNIFICANT CHANGE UP (ref 10.3–14.5)
SODIUM SERPL-SCNC: 145 MMOL/L — SIGNIFICANT CHANGE UP (ref 135–145)
WBC # BLD: 11.4 K/UL — HIGH (ref 3.8–10.5)
WBC # FLD AUTO: 11.4 K/UL — HIGH (ref 3.8–10.5)

## 2018-08-27 PROCEDURE — 72190 X-RAY EXAM OF PELVIS: CPT | Mod: 26

## 2018-08-27 PROCEDURE — 74176 CT ABD & PELVIS W/O CONTRAST: CPT | Mod: 26

## 2018-08-27 PROCEDURE — 99291 CRITICAL CARE FIRST HOUR: CPT

## 2018-08-27 RX ORDER — ACETAMINOPHEN 500 MG
1000 TABLET ORAL ONCE
Qty: 0 | Refills: 0 | Status: DISCONTINUED | OUTPATIENT
Start: 2018-08-27 | End: 2018-08-28

## 2018-08-27 RX ORDER — ACETAMINOPHEN 500 MG
975 TABLET ORAL EVERY 8 HOURS
Qty: 0 | Refills: 0 | Status: DISCONTINUED | OUTPATIENT
Start: 2018-08-27 | End: 2018-08-29

## 2018-08-27 RX ORDER — QUETIAPINE FUMARATE 200 MG/1
50 TABLET, FILM COATED ORAL DAILY
Qty: 0 | Refills: 0 | Status: DISCONTINUED | OUTPATIENT
Start: 2018-08-27 | End: 2018-08-28

## 2018-08-27 RX ORDER — DEXTROSE MONOHYDRATE, SODIUM CHLORIDE, AND POTASSIUM CHLORIDE 50; .745; 4.5 G/1000ML; G/1000ML; G/1000ML
1000 INJECTION, SOLUTION INTRAVENOUS
Qty: 0 | Refills: 0 | Status: DISCONTINUED | OUTPATIENT
Start: 2018-08-27 | End: 2018-08-28

## 2018-08-27 RX ORDER — SODIUM CHLORIDE 9 MG/ML
500 INJECTION INTRAMUSCULAR; INTRAVENOUS; SUBCUTANEOUS ONCE
Qty: 0 | Refills: 0 | Status: COMPLETED | OUTPATIENT
Start: 2018-08-27 | End: 2018-08-27

## 2018-08-27 RX ORDER — PANTOPRAZOLE SODIUM 20 MG/1
40 TABLET, DELAYED RELEASE ORAL
Qty: 0 | Refills: 0 | Status: DISCONTINUED | OUTPATIENT
Start: 2018-08-27 | End: 2018-08-28

## 2018-08-27 RX ORDER — ACETAMINOPHEN 500 MG
1000 TABLET ORAL ONCE
Qty: 0 | Refills: 0 | Status: COMPLETED | OUTPATIENT
Start: 2018-08-27 | End: 2018-08-27

## 2018-08-27 RX ORDER — CLONAZEPAM 1 MG
2 TABLET ORAL AT BEDTIME
Qty: 0 | Refills: 0 | Status: DISCONTINUED | OUTPATIENT
Start: 2018-08-27 | End: 2018-08-28

## 2018-08-27 RX ORDER — MIRTAZAPINE 45 MG/1
45 TABLET, ORALLY DISINTEGRATING ORAL DAILY
Qty: 0 | Refills: 0 | Status: DISCONTINUED | OUTPATIENT
Start: 2018-08-27 | End: 2018-08-28

## 2018-08-27 RX ORDER — PANTOPRAZOLE SODIUM 20 MG/1
40 TABLET, DELAYED RELEASE ORAL
Qty: 0 | Refills: 0 | Status: DISCONTINUED | OUTPATIENT
Start: 2018-08-27 | End: 2018-08-27

## 2018-08-27 RX ORDER — TRAMADOL HYDROCHLORIDE 50 MG/1
50 TABLET ORAL EVERY 6 HOURS
Qty: 0 | Refills: 0 | Status: DISCONTINUED | OUTPATIENT
Start: 2018-08-27 | End: 2018-08-28

## 2018-08-27 RX ORDER — SERTRALINE 25 MG/1
50 TABLET, FILM COATED ORAL DAILY
Qty: 0 | Refills: 0 | Status: DISCONTINUED | OUTPATIENT
Start: 2018-08-27 | End: 2018-08-28

## 2018-08-27 RX ADMIN — Medication 3 MILLILITER(S): at 18:37

## 2018-08-27 RX ADMIN — DEXTROSE MONOHYDRATE, SODIUM CHLORIDE, AND POTASSIUM CHLORIDE 30 MILLILITER(S): 50; .745; 4.5 INJECTION, SOLUTION INTRAVENOUS at 18:39

## 2018-08-27 RX ADMIN — Medication 2 MILLIGRAM(S): at 22:15

## 2018-08-27 RX ADMIN — QUETIAPINE FUMARATE 50 MILLIGRAM(S): 200 TABLET, FILM COATED ORAL at 22:16

## 2018-08-27 RX ADMIN — LIDOCAINE 1 PATCH: 4 CREAM TOPICAL at 06:06

## 2018-08-27 RX ADMIN — Medication 400 MILLIGRAM(S): at 07:00

## 2018-08-27 RX ADMIN — SERTRALINE 50 MILLIGRAM(S): 25 TABLET, FILM COATED ORAL at 18:37

## 2018-08-27 RX ADMIN — Medication 1 MILLIGRAM(S): at 19:03

## 2018-08-27 RX ADMIN — MIRTAZAPINE 45 MILLIGRAM(S): 45 TABLET, ORALLY DISINTEGRATING ORAL at 22:15

## 2018-08-27 RX ADMIN — Medication 100 MILLIGRAM(S): at 06:07

## 2018-08-27 RX ADMIN — Medication 400 MILLIGRAM(S): at 20:32

## 2018-08-27 RX ADMIN — PANTOPRAZOLE SODIUM 40 MILLIGRAM(S): 20 TABLET, DELAYED RELEASE ORAL at 18:38

## 2018-08-27 RX ADMIN — LIDOCAINE 1 PATCH: 4 CREAM TOPICAL at 22:15

## 2018-08-27 RX ADMIN — SODIUM CHLORIDE 1000 MILLILITER(S): 9 INJECTION INTRAMUSCULAR; INTRAVENOUS; SUBCUTANEOUS at 22:56

## 2018-08-27 RX ADMIN — CHLORHEXIDINE GLUCONATE 1 APPLICATION(S): 213 SOLUTION TOPICAL at 06:10

## 2018-08-27 RX ADMIN — DIPHENHYDRAMINE HYDROCHLORIDE AND LIDOCAINE HYDROCHLORIDE AND ALUMINUM HYDROXIDE AND MAGNESIUM HYDRO 5 MILLILITER(S): KIT at 13:01

## 2018-08-27 RX ADMIN — Medication 0.5 MILLIGRAM(S): at 22:24

## 2018-08-27 RX ADMIN — Medication 3 MILLILITER(S): at 13:00

## 2018-08-27 RX ADMIN — Medication 0.5 MILLIGRAM(S): at 13:00

## 2018-08-27 RX ADMIN — Medication 3 MILLILITER(S): at 08:04

## 2018-08-27 RX ADMIN — DEXTROSE MONOHYDRATE, SODIUM CHLORIDE, AND POTASSIUM CHLORIDE 100 MILLILITER(S): 50; .745; 4.5 INJECTION, SOLUTION INTRAVENOUS at 07:22

## 2018-08-27 RX ADMIN — LIDOCAINE 1 PATCH: 4 CREAM TOPICAL at 18:38

## 2018-08-27 RX ADMIN — ENOXAPARIN SODIUM 40 MILLIGRAM(S): 100 INJECTION SUBCUTANEOUS at 13:00

## 2018-08-27 NOTE — PROGRESS NOTE ADULT - ASSESSMENT
68F POD24 s/p R hip PJI explant and placement of cement spacer with distal femur ORIF  Pain control  Stop tube feeds in setting of high residual volumes  FU CXR official read  FU UA, CT a/p, FU BCx  FU medicine recs re: infectious management  NWB RLE, FFWB LLEPT/OT/OOB

## 2018-08-27 NOTE — PROGRESS NOTE ADULT - ASSESSMENT
await reading cxr.  recommend tube check.  2cm at skin, ? displaced.  abdoen soft.   worried about iodne, oral no iv contrast and can use no idodine if needed.  but for now I think tube check and x-ray to start.

## 2018-08-27 NOTE — PROGRESS NOTE ADULT - SUBJECTIVE AND OBJECTIVE BOX
MYRIAM WHITE:[id number],   68yFemale followed for: dysphagia, fever  ASA; dye contrast (Anaphylaxis)  aspirin (Short breath)  divalproex sodium (Other (Unknown))  Haldol (Other (Unknown))  penicillin (Short breath; Rash)  sulfa drugs (Short breath; Rash)  Xanax (Other (Unknown))    PAST MEDICAL & SURGICAL HISTORY:  CVA (cerebral vascular accident)  Fatty pancreas  PNA (pneumonia)  Pulmonary HTN  IGT (impaired glucose tolerance)  Ulcerative colitis  Acid reflux  Anxiety  Depression  Mouth sores  HLD (hyperlipidemia)  Asthma  Humeral head fracture  H/O: hysterectomy  H/O cataract extraction, left  History of knee replacement    FAMILY HISTORY:  Family history of dementia (Grandparent)  Family history of colon cancer (Grandparent)    MEDICATIONS  (STANDING):  ALBUTerol/ipratropium for Nebulization 3 milliLiter(s) Nebulizer every 6 hours  buDESOnide   0.5 milliGRAM(s) Respule 0.5 milliGRAM(s) Inhalation every 12 hours  chlorhexidine 4% Liquid 1 Application(s) Topical <User Schedule>  clonazePAM Tablet 2 milliGRAM(s) Oral at bedtime  dextrose 5% + sodium chloride 0.45% with potassium chloride 20 mEq/L 1000 milliLiter(s) (100 mL/Hr) IV Continuous <Continuous>  enoxaparin Injectable 40 milliGRAM(s) SubCutaneous daily  famotidine    Tablet 20 milliGRAM(s) Oral daily  FIRST- Mouthwash  BLM 5 milliLiter(s) Swish and Spit daily  insulin lispro (HumaLOG) corrective regimen sliding scale   SubCutaneous every 6 hours  lidocaine   Patch 1 Patch Transdermal every 24 hours  melatonin 3 milliGRAM(s) Oral <User Schedule>  mirtazapine 45 milliGRAM(s) Oral <User Schedule>  multivitamin/minerals 1 Tablet(s) Oral daily  pantoprazole   Suspension 40 milliGRAM(s) Oral once  QUEtiapine 50 milliGRAM(s) Oral <User Schedule>  sertraline 50 milliGRAM(s) Oral daily  sodium chloride 0.9% lock flush 20 milliLiter(s) IV Push once  vancomycin  IVPB 500 milliGRAM(s) IV Intermittent every 24 hours    MEDICATIONS  (PRN):  acetaminophen    Suspension 650 milliGRAM(s) Enteral Tube every 6 hours PRN For Temp greater than 38 C (100.4 F)  acetaminophen    Suspension. 650 milliGRAM(s) Enteral Tube every 6 hours PRN Mild Pain (1 - 3)  clonazePAM Tablet 1 milliGRAM(s) Oral daily PRN agitation  diphenhydrAMINE   Injectable 25 milliGRAM(s) IV Push every 6 hours PRN Rash and/or Itching  LORazepam   Injectable 0.5 milliGRAM(s) IntraMuscular once PRN breakthrough restlesless/anxiety  nystatin Powder 1 Application(s) Topical two times a day PRN rash/itchiness  sodium chloride 0.9% lock flush 10 milliLiter(s) IV Push every 1 hour PRN After each medication administration  sodium chloride 0.9% lock flush 10 milliLiter(s) IV Push every 12 hours PRN Lumen of catheter NOT used  traMADol 50 milliGRAM(s) Oral every 6 hours PRN Severe Pain (7 - 10)      Vital Signs Last 24 Hrs  T(C): 37 (27 Aug 2018 08:05), Max: 39.1 (26 Aug 2018 20:17)  T(F): 98.6 (27 Aug 2018 08:05), Max: 102.3 (26 Aug 2018 20:17)  HR: 106 (27 Aug 2018 08:05) (95 - 130)  BP: 124/59 (27 Aug 2018 08:05) (111/69 - 159/77)  BP(mean): --  RR: 19 (27 Aug 2018 08:05) (18 - 19)  SpO2: 99% (27 Aug 2018 08:05) (94% - 99%)  nc/at  s1s2  cta  soft, nt, nd no guarding or rebound  no c/c/e    CBC Full  -  ( 27 Aug 2018 07:10 )  WBC Count : 11.4 K/uL  Hemoglobin : 9.0 g/dL  Hematocrit : 28.1 %  Platelet Count - Automated : 332 K/uL  Mean Cell Volume : 90.9 fl  Mean Cell Hemoglobin : 29.2 pg  Mean Cell Hemoglobin Concentration : 32.1 gm/dL  Auto Neutrophil # : x  Auto Lymphocyte # : x  Auto Monocyte # : x  Auto Eosinophil # : x  Auto Basophil # : x  Auto Neutrophil % : x  Auto Lymphocyte % : x  Auto Monocyte % : x  Auto Eosinophil % : x  Auto Basophil % : x    08-27    145  |  108  |  23  ----------------------------<  97  3.9   |  26  |  1.04    Ca    8.1<L>      27 Aug 2018 07:10

## 2018-08-27 NOTE — PROGRESS NOTE ADULT - SUBJECTIVE AND OBJECTIVE BOX
---___---___---___---___---___---___ ---___---___---___---___---___---___---___---___---___---                    <<<  M E D I C A L   A T T E N D I N G    F O L L O W    U P   N O T E  >>>  having abdominal pain ct scan abdomen was [id number] for intra abdominal etiology.  had fever a few days ago and feels warm today will get a ua to check     ---___---___---___---___---___---      <<<  MEDICATIONS:  >>>    MEDICATIONS  (STANDING):  ALBUTerol/ipratropium for Nebulization 3 milliLiter(s) Nebulizer every 6 hours  buDESOnide   0.5 milliGRAM(s) Respule 0.5 milliGRAM(s) Inhalation every 12 hours  chlorhexidine 4% Liquid 1 Application(s) Topical <User Schedule>  clonazePAM Tablet 2 milliGRAM(s) Oral at bedtime  dextrose 5% + sodium chloride 0.45% with potassium chloride 20 mEq/L 1000 milliLiter(s) (30 mL/Hr) IV Continuous <Continuous>  enoxaparin Injectable 40 milliGRAM(s) SubCutaneous daily  FIRST- Mouthwash  BLM 5 milliLiter(s) Swish and Spit daily  insulin lispro (HumaLOG) corrective regimen sliding scale   SubCutaneous every 6 hours  lidocaine   Patch 1 Patch Transdermal every 24 hours  mirtazapine 45 milliGRAM(s) Oral daily  pantoprazole   Suspension 40 milliGRAM(s) Oral before breakfast  QUEtiapine 50 milliGRAM(s) Oral daily  sertraline 50 milliGRAM(s) Oral daily  sodium chloride 0.9% lock flush 20 milliLiter(s) IV Push once  vancomycin  IVPB 500 milliGRAM(s) IV Intermittent every 24 hours      MEDICATIONS  (PRN):  acetaminophen    Suspension 650 milliGRAM(s) Enteral Tube every 6 hours PRN For Temp greater than 38 C (100.4 F)  acetaminophen   Tablet. 975 milliGRAM(s) Oral every 8 hours PRN Mild Pain (1 - 3)  clonazePAM Tablet 1 milliGRAM(s) Oral daily PRN agitation  diphenhydrAMINE   Injectable 25 milliGRAM(s) IV Push every 6 hours PRN Rash and/or Itching  nystatin Powder 1 Application(s) Topical two times a day PRN rash/itchiness  sodium chloride 0.9% lock flush 10 milliLiter(s) IV Push every 1 hour PRN After each medication administration  sodium chloride 0.9% lock flush 10 milliLiter(s) IV Push every 12 hours PRN Lumen of catheter NOT used  traMADol 50 milliGRAM(s) Oral every 6 hours PRN Moderate Pain (4 - 6)       ---___---___---___---___---___---     <<<REVIEW OF SYSTEM: >>>    GEN: no fever,  chills, no pain  RESP: no SOB, no cough, no sputum  CVS: no chest pain, no palpitations, no edema  GI: no abdominal pain, no nausea, no vomiting, no constipation, no diarrhea  : no dysurea, no frequency  NEURO: no headache, no dizziness  PSYCH: no depression, not anxious  Derm : no itching, no rash     ---___---___---___---___---___---          <<<  VITAL SIGNS: >>>    T(F): 99.8 (08-27-18 @ 16:29), Max: 102.3 (08-26-18 @ 20:17)  HR: 114 (08-27-18 @ 16:29) (100 - 130)  BP: 139/76 (08-27-18 @ 16:29) (111/69 - 148/79)  RR: 18 (08-27-18 @ 16:29) (18 - 19)  SpO2: 100% (08-27-18 @ 16:29) (94% - 100%)  Wt(kg): --  CAPILLARY BLOOD GLUCOSE      POCT Blood Glucose.: 109 mg/dL (27 Aug 2018 12:00)    I&O's Summary    26 Aug 2018 07:01  -  27 Aug 2018 07:00  --------------------------------------------------------  IN: 900 mL / OUT: 350 mL / NET: 550 mL    27 Aug 2018 07:01  -  27 Aug 2018 18:12  --------------------------------------------------------  IN: 0 mL / OUT: 0 mL / NET: 0 mL         ---___---___---___---___---___---                       PHYSICAL EXAM:    GEN: A&O X 3 , NAD , comfortable  HEENT: NCAT, PERRL, MMM, no scleral icterus, hearing intact  NECK: Supple, No JVD  CVS: S1S2 , regular , No M/R/G appreciated  PULM: CTA B/L,  no W/R/R appreciated  ABD.: soft. non tender, non distended,  bowel sounds present peg noted   Extrem: intact pulses , no edema noted  Derm: No rash or ecchymosis noted  PSYCH: normal mood, no depression, not anxious     ---___---___---___---___---___---     <<<  LAB AND IMAGING: >>>                          9.0    11.4  )-----------( 332      ( 27 Aug 2018 07:10 )             28.1               08-27    145  |  108  |  23  ----------------------------<  97  3.9   |  26  |  1.04    Ca    8.1<L>      27 Aug 2018 07:10                                 [All pertinent / recent available Imaging reports and other labs reviewed]     ---___---___---___---___---___---___ ---___---___---___---___---           <<<  A S S E S S M E N T   A N D   P L A N :  >>>          -GI/DVT Prophylaxis.    --------------------------------------------     >>______________________<<      Deniz Bolden .         phone   [phone]

## 2018-08-27 NOTE — PROGRESS NOTE ADULT - ASSESSMENT
69 yo female with dementia, history of UC, and s/p RT Hip hemiarthroplasty 6/22/18  postsurgical hematoma with secondary infection  Admitted with low grade fever, rigors, increased confusion, found to have leukocytosis, 20% bands, ICU, pressor use   Bld Cxs 7/26-8/1 all  positive all MRSA  s/p R hip I&D, lavage, poly exchange- pus encountered, all specimens S aureus.Initial surgery 7/27, repeat debridement 8/4, now with spacer  Afebrile, Creat stable  Dapto and ceftaroline used for a short time,concerns of rash led to dapto and rifampin, fever led to switch to vanco as sole MRSA agent  GAYATHRI: no evidence of valvular vegetation  s/p return to OR, removal of hardware/spacer,  perhaps effective source control on 8/4  Rash has waxed and waned x weeks  Now with recurrent fever after Peg placement on 8/24  Abnormal CXR with left effusion but this is not a new finding.  Empiric meropenem course completed a few weeks ago.  History limited by dementia    Plan:  1.continue vanco, day 23 of planned 42 day course  2.Vanco trough weekly, 15.9 in good range  3.Await reading on CT and CXR, will not broaden antibiotics at this point  4.Differential includes both infectious and and non infectious source to fever. 69 yo female with dementia, history of UC, and s/p RT Hip hemiarthroplasty 6/22/18  postsurgical hematoma with secondary infection  Admitted with low grade fever, rigors, increased confusion, found to have leukocytosis, 20% bands, ICU, pressor use   Bld Cxs 7/26-8/1 all  positive all MRSA  s/p R hip I&D, lavage, poly exchange- pus encountered, all specimens S aureus.Initial surgery 7/27, repeat debridement 8/4, now with spacer  Afebrile, Creat stable  Dapto and ceftaroline used for a short time,concerns of rash led to dapto and rifampin, fever led to switch to vanco as sole MRSA agent  GAYATHRI: no evidence of valvular vegetation  s/p return to OR, removal of hardware/spacer,  perhaps effective source control on 8/4  Rash has waxed and waned x weeks  Now with recurrent fever after Peg placement on 8/24  Abnormal CXR with left effusion but this is not a new finding.  Empiric meropenem course completed a few weeks ago.  History limited by dementia  Rt wrist does not appear inflamed, ? pain secondary to prior restraints.  RN reports PICC was  adjusted  last week, if fevers continue we will have va low threshold to pull PICC line  Plan:  1.continue vanco, day 23 of planned 42 day course  2.Vanco trough weekly, 15.9 in good range  3.Await reading on CT and CXR, will not broaden antibiotics at this point  4.Differential includes both infectious and and non infectious source to fever.  5. updated at bedside.

## 2018-08-27 NOTE — PROGRESS NOTE ADULT - SUBJECTIVE AND OBJECTIVE BOX
Orthopedic Progress Note     S:  Patient uncomfortably lying in bed, shaking, complaining of fevers/chills.  Patient is minimally cooperative with history/exam.    T(C): 38.4 (08-27-18 @ 06:12), Max: 39.1 (08-26-18 @ 20:17)  HR: 110 (08-27-18 @ 06:12) (95 - 130)  BP: 148/79 (08-27-18 @ 06:12) (111/69 - 159/77)  RR: 19 (08-27-18 @ 06:12) (18 - 20)  SpO2: 96% (08-27-18 @ 06:12) (94% - 96%)  Wt(kg): --I&O's Summary    25 Aug 2018 07:01  -  26 Aug 2018 07:00  --------------------------------------------------------  IN: 1090 mL / OUT: 400 mL / NET: 690 mL    26 Aug 2018 07:01  -  27 Aug 2018 06:55  --------------------------------------------------------  IN: 900 mL / OUT: 350 mL / NET: 550 mL      O:  Physical exam:  Gen: Patient lying in bed shaking, alert and oriented but uncooperative with exam  RLE: Incision well-appearing, no erythema/drainage.  SILT, uncooperative with motor exam.  WWP distally  LLE: SILT, uncooperative with motor exam.  WWP distally           Labs:                        9.2    11.4  )-----------( 314      ( 26 Aug 2018 06:44 )             28.9    08-26    144  |  105  |  27<H>  ----------------------------<  145<H>  3.7   |  26  |  0.96    Ca    8.5      26 Aug 2018 06:44        Imaging:  < from: Xray Wrist 3 Views, Right (08.26.18 @ 10:53) >  IMPRESSION:   The bones are diffusely demineralized. There is no acute fracture or   dislocation.  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 There is   mild widening of the scapholunate interval. There is subluxation of the   second through fourth metacarpal phalangeal joints.      < end of copied text >

## 2018-08-27 NOTE — PROGRESS NOTE ADULT - SUBJECTIVE AND OBJECTIVE BOX
CC: f/u for fevers    Patient reports: she is poorly interactive, not able to verbalize feelings    REVIEW OF SYSTEMS:  All other review of systems negative (Comprehensive ROS): high residuals, enteral feeds on hold, fevers x 36 hours    Antimicrobials Day # day 23 MRSA therapy :  vancomycin  IVPB 500 milliGRAM(s) IV Intermittent every 24 hours    Other Medications Reviewed    T(F): 98 (08-27-18 @ 11:03), Max: 102.3 (08-26-18 @ 20:17)  HR: 100 (08-27-18 @ 11:03)  BP: 127/70 (08-27-18 @ 11:03)  RR: 18 (08-27-18 @ 11:03)  SpO2: 100% (08-27-18 @ 11:03)  Wt(kg): --    PHYSICAL EXAM:  General: alert, no acute distress  Eyes:  anicteric, no conjunctival injection, no discharge  Oropharynx: no lesions or injection 	  Neck: supple, without adenopathy  Lungs: clear to auscultation  Heart: regular rate and rhythm; no murmur, rubs or gallops  Abdomen: soft, nondistended, nontender, without mass or organomegaly, peg in place  Skin: faint generalized rash  Extremities: no clubbing, cyanosis, or edema  Neurologic: alert, baseline dementia, moves all extremities  RT hip incision is c/d/i,  Incontinence catheter in place  LAB RESULTS:                        9.0    11.4  )-----------( 332      ( 27 Aug 2018 07:10 )             28.1     08-27    145  |  108  |  23  ----------------------------<  97  3.9   |  26  |  1.04    Ca    8.1<L>      27 Aug 2018 07:10    Vanco level is 15.7      MICROBIOLOGY:  RECENT CULTURES:  Blood culture pending    RADIOLOGY REVIEWED:    < from: Xray Wrist 3 Views, Right (08.26.18 @ 10:53) >  INTERPRETATION:  CLINICAL INDICATION: Wrist pain.    EXAM: 3 views of the right wrist.    COMPARISON: Radiographs dated 10/6/2018.      IMPRESSION:   The bones are diffusely demineralized. There is no acute fracture or   dislocation.  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 There is   mild widening of the scapholunate interval. There is subluxation of the   second through fourth metacarpal phalangeal joints.    CXR and CT scan of A/P pending CC: f/u for fevers    Patient reports: she is poorly interactive, not able to verbalize feelings    REVIEW OF SYSTEMS:  All other review of systems negative (Comprehensive ROS): high residuals, enteral feeds on hold, fevers x 36 hours    Antimicrobials Day # day 23 MRSA therapy :  vancomycin  IVPB 500 milliGRAM(s) IV Intermittent every 24 hours    Other Medications Reviewed    T(F): 98 (08-27-18 @ 11:03), Max: 102.3 (08-26-18 @ 20:17)  HR: 100 (08-27-18 @ 11:03)  BP: 127/70 (08-27-18 @ 11:03)  RR: 18 (08-27-18 @ 11:03)  SpO2: 100% (08-27-18 @ 11:03)  Wt(kg): --    PHYSICAL EXAM:  General: alert, no acute distress  Eyes:  anicteric, no conjunctival injection, no discharge  Oropharynx: no lesions or injection 	  Neck: supple, without adenopathy  Lungs: clear to auscultation  Heart: regular rate and rhythm; no murmur, rubs or gallops  Abdomen: soft, nondistended, nontender, without mass or organomegaly, peg in place  Skin: faint generalized rash  Extremities: no clubbing, cyanosis, or edema.Minimal discomfort with rt wrist movement  Neurologic: alert, baseline dementia, moves all extremities  RT hip incision is c/d/i,  Incontinence catheter in place, left arm PICC line  LAB RESULTS:                        9.0    11.4  )-----------( 332      ( 27 Aug 2018 07:10 )             28.1     08-27    145  |  108  |  23  ----------------------------<  97  3.9   |  26  |  1.04    Ca    8.1<L>      27 Aug 2018 07:10    Vanco level is 15.7      MICROBIOLOGY:  RECENT CULTURES:  Blood culture pending    RADIOLOGY REVIEWED:    < from: Xray Wrist 3 Views, Right (08.26.18 @ 10:53) >  INTERPRETATION:  CLINICAL INDICATION: Wrist pain.    EXAM: 3 views of the right wrist.    COMPARISON: Radiographs dated 10/6/2018.      IMPRESSION:   The bones are diffusely demineralized. There is no acute fracture or   dislocation.  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 There is   mild widening of the scapholunate interval. There is subluxation of the   second through fourth metacarpal phalangeal joints.    CXR and CT scan of A/P pending

## 2018-08-28 LAB
ALBUMIN SERPL ELPH-MCNC: 1.5 G/DL — LOW (ref 3.3–5)
ALP SERPL-CCNC: 136 U/L — HIGH (ref 40–120)
ALT FLD-CCNC: 8 U/L — LOW (ref 10–45)
ANION GAP SERPL CALC-SCNC: 11 MMOL/L — SIGNIFICANT CHANGE UP (ref 5–17)
APPEARANCE UR: CLEAR — SIGNIFICANT CHANGE UP
APTT BLD: 39.1 SEC — HIGH (ref 27.5–37.4)
AST SERPL-CCNC: 11 U/L — SIGNIFICANT CHANGE UP (ref 10–40)
BACTERIA # UR AUTO: ABNORMAL /HPF
BASOPHILS # BLD AUTO: 0.1 K/UL — SIGNIFICANT CHANGE UP (ref 0–0.2)
BASOPHILS NFR BLD AUTO: 0.7 % — SIGNIFICANT CHANGE UP (ref 0–2)
BILIRUB SERPL-MCNC: 0.4 MG/DL — SIGNIFICANT CHANGE UP (ref 0.2–1.2)
BILIRUB UR-MCNC: NEGATIVE — SIGNIFICANT CHANGE UP
BLD GP AB SCN SERPL QL: NEGATIVE — SIGNIFICANT CHANGE UP
BUN SERPL-MCNC: 22 MG/DL — SIGNIFICANT CHANGE UP (ref 7–23)
CALCIUM SERPL-MCNC: 7.3 MG/DL — LOW (ref 8.4–10.5)
CHLORIDE SERPL-SCNC: 109 MMOL/L — HIGH (ref 96–108)
CO2 SERPL-SCNC: 21 MMOL/L — LOW (ref 22–31)
COLOR SPEC: YELLOW — SIGNIFICANT CHANGE UP
CREAT SERPL-MCNC: 1.09 MG/DL — SIGNIFICANT CHANGE UP (ref 0.5–1.3)
DIFF PNL FLD: ABNORMAL
EOSINOPHIL # BLD AUTO: 0.6 K/UL — HIGH (ref 0–0.5)
EOSINOPHIL NFR BLD AUTO: 5.5 % — SIGNIFICANT CHANGE UP (ref 0–6)
GAS PNL BLDA: SIGNIFICANT CHANGE UP
GAS PNL BLDA: SIGNIFICANT CHANGE UP
GLUCOSE SERPL-MCNC: 131 MG/DL — HIGH (ref 70–99)
GLUCOSE UR QL: NEGATIVE — SIGNIFICANT CHANGE UP
HCT VFR BLD CALC: 22.6 % — LOW (ref 34.5–45)
HCT VFR BLD CALC: 26.3 % — LOW (ref 34.5–45)
HCT VFR BLD CALC: 26.9 % — LOW (ref 34.5–45)
HCT VFR BLD CALC: 27.6 % — LOW (ref 34.5–45)
HGB BLD-MCNC: 7.4 G/DL — LOW (ref 11.5–15.5)
HGB BLD-MCNC: 8.6 G/DL — LOW (ref 11.5–15.5)
HGB BLD-MCNC: 8.8 G/DL — LOW (ref 11.5–15.5)
HGB BLD-MCNC: 8.9 G/DL — LOW (ref 11.5–15.5)
INR BLD: 1.35 RATIO — HIGH (ref 0.88–1.16)
KETONES UR-MCNC: NEGATIVE — SIGNIFICANT CHANGE UP
LEUKOCYTE ESTERASE UR-ACNC: NEGATIVE — SIGNIFICANT CHANGE UP
LYMPHOCYTES # BLD AUTO: 1.1 K/UL — SIGNIFICANT CHANGE UP (ref 1–3.3)
LYMPHOCYTES # BLD AUTO: 10.6 % — LOW (ref 13–44)
MCHC RBC-ENTMCNC: 29.1 PG — SIGNIFICANT CHANGE UP (ref 27–34)
MCHC RBC-ENTMCNC: 29.6 PG — SIGNIFICANT CHANGE UP (ref 27–34)
MCHC RBC-ENTMCNC: 32.2 GM/DL — SIGNIFICANT CHANGE UP (ref 32–36)
MCHC RBC-ENTMCNC: 32.5 GM/DL — SIGNIFICANT CHANGE UP (ref 32–36)
MCHC RBC-ENTMCNC: 32.6 GM/DL — SIGNIFICANT CHANGE UP (ref 32–36)
MCHC RBC-ENTMCNC: 32.9 GM/DL — SIGNIFICANT CHANGE UP (ref 32–36)
MCV RBC AUTO: 89.9 FL — SIGNIFICANT CHANGE UP (ref 80–100)
MCV RBC AUTO: 90.5 FL — SIGNIFICANT CHANGE UP (ref 80–100)
MCV RBC AUTO: 90.7 FL — SIGNIFICANT CHANGE UP (ref 80–100)
MCV RBC AUTO: 90.9 FL — SIGNIFICANT CHANGE UP (ref 80–100)
MONOCYTES # BLD AUTO: 1.4 K/UL — HIGH (ref 0–0.9)
MONOCYTES NFR BLD AUTO: 13.4 % — SIGNIFICANT CHANGE UP (ref 2–14)
NEUTROPHILS # BLD AUTO: 7.5 K/UL — HIGH (ref 1.8–7.4)
NEUTROPHILS NFR BLD AUTO: 69.8 % — SIGNIFICANT CHANGE UP (ref 43–77)
NITRITE UR-MCNC: NEGATIVE — SIGNIFICANT CHANGE UP
PH UR: 7 — SIGNIFICANT CHANGE UP (ref 5–8)
PLATELET # BLD AUTO: 231 K/UL — SIGNIFICANT CHANGE UP (ref 150–400)
PLATELET # BLD AUTO: 244 K/UL — SIGNIFICANT CHANGE UP (ref 150–400)
PLATELET # BLD AUTO: 255 K/UL — SIGNIFICANT CHANGE UP (ref 150–400)
PLATELET # BLD AUTO: 267 K/UL — SIGNIFICANT CHANGE UP (ref 150–400)
POTASSIUM SERPL-MCNC: 3.4 MMOL/L — LOW (ref 3.5–5.3)
POTASSIUM SERPL-SCNC: 3.4 MMOL/L — LOW (ref 3.5–5.3)
PROT SERPL-MCNC: 4.5 G/DL — LOW (ref 6–8.3)
PROT UR-MCNC: 30 MG/DL
PROTHROM AB SERPL-ACNC: 14.7 SEC — HIGH (ref 9.8–12.7)
RBC # BLD: 2.49 M/UL — LOW (ref 3.8–5.2)
RBC # BLD: 2.93 M/UL — LOW (ref 3.8–5.2)
RBC # BLD: 2.96 M/UL — LOW (ref 3.8–5.2)
RBC # BLD: 3.05 M/UL — LOW (ref 3.8–5.2)
RBC # FLD: 13.8 % — SIGNIFICANT CHANGE UP (ref 10.3–14.5)
RBC # FLD: 13.8 % — SIGNIFICANT CHANGE UP (ref 10.3–14.5)
RBC # FLD: 13.9 % — SIGNIFICANT CHANGE UP (ref 10.3–14.5)
RBC # FLD: 14.2 % — SIGNIFICANT CHANGE UP (ref 10.3–14.5)
RBC CASTS # UR COMP ASSIST: ABNORMAL /HPF (ref 0–2)
RH IG SCN BLD-IMP: NEGATIVE — SIGNIFICANT CHANGE UP
SODIUM SERPL-SCNC: 141 MMOL/L — SIGNIFICANT CHANGE UP (ref 135–145)
SP GR SPEC: 1.01 — SIGNIFICANT CHANGE UP (ref 1.01–1.02)
UROBILINOGEN FLD QL: NEGATIVE — SIGNIFICANT CHANGE UP
VANCOMYCIN FLD-MCNC: 14.8 UG/ML — SIGNIFICANT CHANGE UP
WBC # BLD: 10.5 K/UL — SIGNIFICANT CHANGE UP (ref 3.8–10.5)
WBC # BLD: 10.7 K/UL — HIGH (ref 3.8–10.5)
WBC # BLD: 9.2 K/UL — SIGNIFICANT CHANGE UP (ref 3.8–10.5)
WBC # BLD: 9.8 K/UL — SIGNIFICANT CHANGE UP (ref 3.8–10.5)
WBC # FLD AUTO: 10.5 K/UL — SIGNIFICANT CHANGE UP (ref 3.8–10.5)
WBC # FLD AUTO: 10.7 K/UL — HIGH (ref 3.8–10.5)
WBC # FLD AUTO: 9.2 K/UL — SIGNIFICANT CHANGE UP (ref 3.8–10.5)
WBC # FLD AUTO: 9.8 K/UL — SIGNIFICANT CHANGE UP (ref 3.8–10.5)
WBC UR QL: SIGNIFICANT CHANGE UP /HPF (ref 0–5)

## 2018-08-28 PROCEDURE — 36620 INSERTION CATHETER ARTERY: CPT

## 2018-08-28 PROCEDURE — 71045 X-RAY EXAM CHEST 1 VIEW: CPT | Mod: 26

## 2018-08-28 PROCEDURE — 99291 CRITICAL CARE FIRST HOUR: CPT | Mod: 25

## 2018-08-28 PROCEDURE — 93010 ELECTROCARDIOGRAM REPORT: CPT

## 2018-08-28 RX ORDER — CEFEPIME 1 G/1
1000 INJECTION, POWDER, FOR SOLUTION INTRAMUSCULAR; INTRAVENOUS ONCE
Qty: 0 | Refills: 0 | Status: COMPLETED | OUTPATIENT
Start: 2018-08-28 | End: 2018-08-28

## 2018-08-28 RX ORDER — SODIUM CHLORIDE 9 MG/ML
1000 INJECTION INTRAMUSCULAR; INTRAVENOUS; SUBCUTANEOUS ONCE
Qty: 0 | Refills: 0 | Status: COMPLETED | OUTPATIENT
Start: 2018-08-28 | End: 2018-08-28

## 2018-08-28 RX ORDER — POTASSIUM CHLORIDE 20 MEQ
20 PACKET (EA) ORAL
Qty: 0 | Refills: 0 | Status: COMPLETED | OUTPATIENT
Start: 2018-08-28 | End: 2018-08-28

## 2018-08-28 RX ORDER — CALCIUM GLUCONATE 100 MG/ML
2 VIAL (ML) INTRAVENOUS ONCE
Qty: 0 | Refills: 0 | Status: COMPLETED | OUTPATIENT
Start: 2018-08-28 | End: 2018-08-28

## 2018-08-28 RX ORDER — SODIUM CHLORIDE 9 MG/ML
1500 INJECTION INTRAMUSCULAR; INTRAVENOUS; SUBCUTANEOUS ONCE
Qty: 0 | Refills: 0 | Status: DISCONTINUED | OUTPATIENT
Start: 2018-08-28 | End: 2018-08-28

## 2018-08-28 RX ORDER — METRONIDAZOLE 500 MG
500 TABLET ORAL ONCE
Qty: 0 | Refills: 0 | Status: COMPLETED | OUTPATIENT
Start: 2018-08-28 | End: 2018-08-28

## 2018-08-28 RX ORDER — METRONIDAZOLE 500 MG
500 TABLET ORAL EVERY 8 HOURS
Qty: 0 | Refills: 0 | Status: COMPLETED | OUTPATIENT
Start: 2018-08-28 | End: 2018-09-03

## 2018-08-28 RX ORDER — SODIUM CHLORIDE 9 MG/ML
1000 INJECTION, SOLUTION INTRAVENOUS
Qty: 0 | Refills: 0 | Status: DISCONTINUED | OUTPATIENT
Start: 2018-08-28 | End: 2018-08-30

## 2018-08-28 RX ORDER — ACETAMINOPHEN 500 MG
1000 TABLET ORAL ONCE
Qty: 0 | Refills: 0 | Status: COMPLETED | OUTPATIENT
Start: 2018-08-28 | End: 2018-08-28

## 2018-08-28 RX ORDER — METRONIDAZOLE 500 MG
TABLET ORAL
Qty: 0 | Refills: 0 | Status: COMPLETED | OUTPATIENT
Start: 2018-08-28 | End: 2018-09-03

## 2018-08-28 RX ORDER — CEFEPIME 1 G/1
1000 INJECTION, POWDER, FOR SOLUTION INTRAMUSCULAR; INTRAVENOUS EVERY 24 HOURS
Qty: 0 | Refills: 0 | Status: COMPLETED | OUTPATIENT
Start: 2018-08-29 | End: 2018-09-03

## 2018-08-28 RX ORDER — PHENYLEPHRINE HYDROCHLORIDE 10 MG/ML
0.04 INJECTION INTRAVENOUS
Qty: 40 | Refills: 0 | Status: DISCONTINUED | OUTPATIENT
Start: 2018-08-28 | End: 2018-08-29

## 2018-08-28 RX ORDER — CEFEPIME 1 G/1
INJECTION, POWDER, FOR SOLUTION INTRAMUSCULAR; INTRAVENOUS
Qty: 0 | Refills: 0 | Status: COMPLETED | OUTPATIENT
Start: 2018-08-28 | End: 2018-09-03

## 2018-08-28 RX ORDER — SODIUM CHLORIDE 9 MG/ML
500 INJECTION, SOLUTION INTRAVENOUS ONCE
Qty: 0 | Refills: 0 | Status: COMPLETED | OUTPATIENT
Start: 2018-08-28 | End: 2018-08-28

## 2018-08-28 RX ORDER — PANTOPRAZOLE SODIUM 20 MG/1
40 TABLET, DELAYED RELEASE ORAL DAILY
Qty: 0 | Refills: 0 | Status: DISCONTINUED | OUTPATIENT
Start: 2018-08-28 | End: 2018-08-29

## 2018-08-28 RX ORDER — PHENYLEPHRINE HYDROCHLORIDE 10 MG/ML
0.4 INJECTION INTRAVENOUS
Qty: 40 | Refills: 0 | Status: DISCONTINUED | OUTPATIENT
Start: 2018-08-28 | End: 2018-08-28

## 2018-08-28 RX ORDER — MAGNESIUM SULFATE 500 MG/ML
2 VIAL (ML) INJECTION
Qty: 0 | Refills: 0 | Status: COMPLETED | OUTPATIENT
Start: 2018-08-28 | End: 2018-08-28

## 2018-08-28 RX ORDER — SODIUM CHLORIDE 9 MG/ML
1500 INJECTION, SOLUTION INTRAVENOUS ONCE
Qty: 0 | Refills: 0 | Status: DISCONTINUED | OUTPATIENT
Start: 2018-08-28 | End: 2018-08-28

## 2018-08-28 RX ADMIN — Medication 1: at 05:24

## 2018-08-28 RX ADMIN — Medication 400 MILLIGRAM(S): at 15:09

## 2018-08-28 RX ADMIN — SODIUM CHLORIDE 100 MILLILITER(S): 9 INJECTION, SOLUTION INTRAVENOUS at 19:44

## 2018-08-28 RX ADMIN — Medication 50 MILLIEQUIVALENT(S): at 03:36

## 2018-08-28 RX ADMIN — Medication 1: at 23:38

## 2018-08-28 RX ADMIN — Medication 50 MILLIEQUIVALENT(S): at 05:43

## 2018-08-28 RX ADMIN — CEFEPIME 100 MILLIGRAM(S): 1 INJECTION, POWDER, FOR SOLUTION INTRAMUSCULAR; INTRAVENOUS at 01:51

## 2018-08-28 RX ADMIN — LIDOCAINE 1 PATCH: 4 CREAM TOPICAL at 21:54

## 2018-08-28 RX ADMIN — Medication 50 GRAM(S): at 02:26

## 2018-08-28 RX ADMIN — Medication 0.5 MILLIGRAM(S): at 23:12

## 2018-08-28 RX ADMIN — Medication 100 MILLIGRAM(S): at 06:31

## 2018-08-28 RX ADMIN — SODIUM CHLORIDE 6000 MILLILITER(S): 9 INJECTION INTRAMUSCULAR; INTRAVENOUS; SUBCUTANEOUS at 01:58

## 2018-08-28 RX ADMIN — CHLORHEXIDINE GLUCONATE 1 APPLICATION(S): 213 SOLUTION TOPICAL at 05:08

## 2018-08-28 RX ADMIN — Medication 3 MILLILITER(S): at 18:16

## 2018-08-28 RX ADMIN — Medication 0.5 MILLIGRAM(S): at 11:41

## 2018-08-28 RX ADMIN — Medication 50 GRAM(S): at 04:39

## 2018-08-28 RX ADMIN — ENOXAPARIN SODIUM 40 MILLIGRAM(S): 100 INJECTION SUBCUTANEOUS at 13:01

## 2018-08-28 RX ADMIN — Medication 50 MILLIEQUIVALENT(S): at 06:53

## 2018-08-28 RX ADMIN — SODIUM CHLORIDE 100 MILLILITER(S): 9 INJECTION, SOLUTION INTRAVENOUS at 01:59

## 2018-08-28 RX ADMIN — Medication 100 MILLIGRAM(S): at 02:04

## 2018-08-28 RX ADMIN — SODIUM CHLORIDE 3000 MILLILITER(S): 9 INJECTION, SOLUTION INTRAVENOUS at 01:58

## 2018-08-28 RX ADMIN — Medication 100 MILLIGRAM(S): at 21:54

## 2018-08-28 RX ADMIN — PHENYLEPHRINE HYDROCHLORIDE 7.56 MICROGRAM(S)/KG/MIN: 10 INJECTION INTRAVENOUS at 01:58

## 2018-08-28 RX ADMIN — CEFEPIME 100 MILLIGRAM(S): 1 INJECTION, POWDER, FOR SOLUTION INTRAMUSCULAR; INTRAVENOUS at 23:57

## 2018-08-28 RX ADMIN — Medication 3 MILLILITER(S): at 23:12

## 2018-08-28 RX ADMIN — Medication 100 MILLIGRAM(S): at 13:01

## 2018-08-28 RX ADMIN — Medication 3 MILLILITER(S): at 05:38

## 2018-08-28 RX ADMIN — Medication 50 GRAM(S): at 03:31

## 2018-08-28 RX ADMIN — Medication 100 MILLIGRAM(S): at 06:49

## 2018-08-28 RX ADMIN — Medication 3 MILLILITER(S): at 11:41

## 2018-08-28 RX ADMIN — Medication 200 GRAM(S): at 05:11

## 2018-08-28 RX ADMIN — PHENYLEPHRINE HYDROCHLORIDE 0.76 MICROGRAM(S)/KG/MIN: 10 INJECTION INTRAVENOUS at 19:44

## 2018-08-28 RX ADMIN — PANTOPRAZOLE SODIUM 40 MILLIGRAM(S): 20 TABLET, DELAYED RELEASE ORAL at 13:01

## 2018-08-28 RX ADMIN — Medication 1: at 18:41

## 2018-08-28 NOTE — PROCEDURE NOTE - NSPROCNAME_GEN_A_CORE
Arterial Puncture/Cannulation
Arterial Puncture/Cannulation
Central Line Insertion
PICC Line Insertion
Central Line Insertion

## 2018-08-28 NOTE — CHART NOTE - NSCHARTNOTEFT_GEN_A_CORE
Rapid response called overnight by RN for "Blood pressure in 60's." In brief, this is a 69 yo female with dementia, history of UC, and s/p RT Hip hemiarthroplasty 6/22/18 with postsurgical hematoma with secondary infection admitted with low grade fever, rigors, increased confusion, found to have leukocytosis, 20% bands, s/p ICU, pressor use, MRSA bacteremia 7/26-8/1 s/p R hip I&D, lavage, poly exchange, repeat debridement 8/4, now with spacer s/p Peg placement on 8/24, course c/b left sided pleural effusion, s/p Empiric meropenem course completed a few weeks ago now on vanco, day 23 of planned 42 day course. On arrival, patient AOx0, but moving all extremities, with SBP in 70's. Ortho resident at bedside, stated that patient had received 500cc bolus prior to arrival. Another liter of saline started at this time with pressure bag assistance, minimally improving blood pressure. Patient noted to have axillary temperature of 101 F as well, likely in septic shock. Patient had received 500mg Vanc earlier today at 6am dosed appropriately for weight of 50kg. SICU consult called with acceptance to unit and recommendations to start phenylephrine for pressor support. Full set of labs sent at this time, with VBG with lytes. CXR obtained with read of unchanged left lung pleural effusion. EKG obtained sinus tachycardia in 120's. Patient's map >65 after initiation of pressors, and pt confirmed to be full code at the bedside. Patient ready for transport to SICU at this time. RRT ended.     Olivia Robledo MD, PGY-3  MAR [id number]

## 2018-08-28 NOTE — PROGRESS NOTE ADULT - SUBJECTIVE AND OBJECTIVE BOX
Orthopedic Progress Note     S:  overnight patient with septic shock RR called and admitted to the SICU, started on pressors and broad spectrum abx, 1u prbc given    O:  Physical exam:  Gen: oriented and alert, follows commands although lethargic  RLE: Incision well-appearing, no erythema/drainage.  SILT, grossly moving toes and ankle.  WWP distally           Labs:                                 8.8    10.5  )-----------( 231      ( 28 Aug 2018 04:49 )             26.9     08-28    141  |  109<H>  |  22  ----------------------------<  131<H>  3.4<L>   |  21<L>  |  1.09    Ca    7.3<L>      28 Aug 2018 00:12  Phos  4.4     08-28  Mg     1.2     08-28    TPro  4.5<L>  /  Alb  1.5<L>  /  TBili  0.4  /  DBili  x   /  AST  11  /  ALT  8<L>  /  AlkPhos  136<H>  08-28

## 2018-08-28 NOTE — PROGRESS NOTE ADULT - SUBJECTIVE AND OBJECTIVE BOX
CC: f/u for    Patient reports    REVIEW OF SYSTEMS:  All other review of systems negative (Comprehensive ROS)    Antimicrobials Day #  :  cefepime   IVPB    day1  metroNIDAZOLE  IVPB      metroNIDAZOLE  IVPB 500 milliGRAM(s) IV Intermittent every 8 hours  day1  vancomycin  IVPB 500 milliGRAM(s) IV Intermittent every 24 hours   day     Other Medications Reviewed    T(F): 101.1 (18 @ 15:00), Max: 102.3 (18 @ 21:18)  HR: 114 (18 @ 16:00)  BP: 110/52 (18 @ 02:00)  RR: 35 (18 @ 16:00)  SpO2: 73% (18 @ 16:00)  Wt(kg): --    PHYSICAL EXAM:  General: lethargic, no acute distress  Eyes:  anicteric, no conjunctival injection, no discharge  Oropharynx: no lesions or injection 	  Neck: supple, without adenopathy  Lungs: basilar decreased to auscultation  Heart: regular rate and rhythm; no murmur, rubs or gallops  Abdomen: soft, nondistended, nontender, without mass or organomegaly  Skin: no lesions  Extremities: no clubbing, cyanosis, . edema. hip wound clean on right  Neurologic: lethargic, moves all extremities    LAB RESULTS:                        8.9    9.2   )-----------( 244      ( 28 Aug 2018 10:06 )             27.6         141  |  109<H>  |  22  ----------------------------<  131<H>  3.4<L>   |  21<L>  |  1.09    Ca    7.3<L>      28 Aug 2018 00:12  Phos  4.4       Mg     1.2         TPro  4.5<L>  /  Alb  1.5<L>  /  TBili  0.4  /  DBili  x   /  AST  11  /  ALT  8<L>  /  AlkPhos  136<H>      LIVER FUNCTIONS - ( 28 Aug 2018 00:12 )  Alb: 1.5 g/dL / Pro: 4.5 g/dL / ALK PHOS: 136 U/L / ALT: 8 U/L / AST: 11 U/L / GGT: x           Urinalysis Basic - ( 28 Aug 2018 03:39 )    Color: Yellow / Appearance: Clear / S.014 / pH: x  Gluc: x / Ketone: Negative  / Bili: Negative / Urobili: Negative   Blood: x / Protein: 30 mg/dL / Nitrite: Negative   Leuk Esterase: Negative / RBC: 10-25 /HPF / WBC 3-5 /HPF   Sq Epi: x / Non Sq Epi: x / Bacteria: Few /HPF      MICROBIOLOGY:  RECENT CULTURES:   @ 23:03 .Blood Blood     No growth to date.          RADIOLOGY REVIEWED:    < from: CT Abdomen and Pelvis No Cont (18 @ 09:35) >    IMPRESSION:     No acute intra abdominal pathology or collection.    Left acetabular fracture as at recent prior imaging.    < from: Xray Chest 1 View-PORTABLE IMMEDIATE (18 @ 00:24) >  IMPRESSION:     Unchanged moderate left pleural effusion.      < end of copied text >      < end of copied text >            Assessment:    Plan:

## 2018-08-28 NOTE — PROCEDURE NOTE - NSINFORMCONSENT_GEN_A_CORE
Benefits, risks, and possible complications of procedure explained to patient/caregiver who verbalized understanding and gave written consent.
This was an emergent procedure.
Benefits, risks, and possible complications of procedure explained to patient/caregiver who verbalized understanding and gave verbal consent.

## 2018-08-28 NOTE — PROGRESS NOTE ADULT - ASSESSMENT
Patient s/p left hip fx, s/p leisa and spacer for mrsa septic prosthetic right hip with bacteremia controlled once source control achieved. Has had stormy post op course with episode of sepsis unclear source that resolved and now with peg and febrile again with transient low bp with no source apparent again. Aspiration , picc bacteremia, thromboembolic disease considerations    Plan   for now continue vanco cefepime and flagyl  f/u cultures  ct chest, consider ct angio chest if can take contrast  doppler legs

## 2018-08-28 NOTE — PROCEDURE NOTE - NSINDICATIONS_GEN_A_CORE
venous access
antibiotic therapy
critical patient/arterial puncture to obtain ABG's/monitoring purposes
blood sampling/cannulation purposes/arterial puncture to obtain ABG's/monitoring purposes/critical patient
venous access/hemodynamic monitoring/volume resuscitation/critical illness/emergency venous access

## 2018-08-28 NOTE — PROGRESS NOTE ADULT - SUBJECTIVE AND OBJECTIVE BOX
---___---___---___---___---___---___ ---___---___---___---___---___---___---___---___---___---                    <<<  M E D I C A L   A T T E N D I N G    F O L L O W    U P   N O T E  >>>    sp  RRT last night at 12am now in sicu .  had gotten pressors and now off the pressors . bp elevated  awake but slightly lethargic  rrt and sicu attending note read and appreciated    ---___---___---___---___---___---      <<<  MEDICATIONS:  >>>    MEDICATIONS  (STANDING):  ALBUTerol/ipratropium for Nebulization 3 milliLiter(s) Nebulizer every 6 hours  buDESOnide   0.5 milliGRAM(s) Respule 0.5 milliGRAM(s) Inhalation every 12 hours  cefepime   IVPB      chlorhexidine 4% Liquid 1 Application(s) Topical <User Schedule>  dextrose 5% + lactated ringers. 1000 milliLiter(s) (100 mL/Hr) IV Continuous <Continuous>  enoxaparin Injectable 40 milliGRAM(s) SubCutaneous daily  insulin lispro (HumaLOG) corrective regimen sliding scale   SubCutaneous every 6 hours  lidocaine   Patch 1 Patch Transdermal every 24 hours  metroNIDAZOLE  IVPB      metroNIDAZOLE  IVPB 500 milliGRAM(s) IV Intermittent every 8 hours  pantoprazole  Injectable 40 milliGRAM(s) IV Push daily  vancomycin  IVPB 500 milliGRAM(s) IV Intermittent every 24 hours      MEDICATIONS  (PRN):  acetaminophen   Tablet. 975 milliGRAM(s) Oral every 8 hours PRN Mild Pain (1 - 3)  nystatin Powder 1 Application(s) Topical two times a day PRN rash/itchiness       ---___---___---___---___---___---     <<<REVIEW OF SYSTEM: >>>    unable to obtain      ---___---___---___---___---___---          <<<  VITAL SIGNS: >>>    T(F): 99.5 (18 @ 11:00), Max: 102.3 (18 @ 21:18)  HR: 101 (18 @ 11:47) (89 - 120)  BP: 110/52 (18 @ 02:00) (90/48 - 145/78)  RR: 29 (18 @ 11:00) (14 - 31)  SpO2: 99% (18 @ 11:47) (82% - 100%)  Wt(kg): --  CAPILLARY BLOOD GLUCOSE      POCT Blood Glucose.: 167 mg/dL (28 Aug 2018 05:14)    I&O's Summary    27 Aug 2018 07:  -  28 Aug 2018 07:00  --------------------------------------------------------  IN: 3649.7 mL / OUT: 710 mL / NET: 2939.7 mL    28 Aug 2018 07:  -  28 Aug 2018 11:55  --------------------------------------------------------  IN: 420 mL / OUT: 270 mL / NET: 150 mL         ---___---___---___---___---___---                       PHYSICAL EXAM:    GEN: A&O X 2 , NAD , comfortable  HEENT: NCAT, PERRL, MMM, no scleral icterus, hearing intact  NECK: Supple, No JVD  CVS: S1S2 , regular , No M/R/G appreciated  PULM: CTA B/L,  no W/R/R appreciated  ABD.: soft. non tender, non distended,  bowel sounds present  Extrem: intact pulses , no edema noted  Derm: No rash or ecchymosis noted        ---___---___---___---___---___---     <<<  LAB AND IMAGING: >>>                          8.9    9.2   )-----------( 244      ( 28 Aug 2018 10:06 )             27.6                   141  |  109<H>  |  22  ----------------------------<  131<H>  3.4<L>   |  21<L>  |  1.09    Ca    7.3<L>      28 Aug 2018 00:12  Phos  4.4       Mg     1.2         TPro  4.5<L>  /  Alb  1.5<L>  /  TBili  0.4  /  DBili  x   /  AST  11  /  ALT  8<L>  /  AlkPhos  136<H>      PT/INR - ( 28 Aug 2018 00:13 )   PT: 14.7 sec;   INR: 1.35 ratio         PTT - ( 28 Aug 2018 00:13 )  PTT:39.1 sec       Urinalysis Basic - ( 28 Aug 2018 03:39 )    Color: Yellow / Appearance: Clear / S.014 / pH: x  Gluc: x / Ketone: Negative  / Bili: Negative / Urobili: Negative   Blood: x / Protein: 30 mg/dL / Nitrite: Negative   Leuk Esterase: Negative / RBC: 10-25 /HPF / WBC 3-5 /HPF   Sq Epi: x / Non Sq Epi: x / Bacteria: Few /HPF                ABG - ( 28 Aug 2018 02:18 )  pH, Arterial: 7.38  pH, Blood: x     /  pCO2: 38    /  pO2: 116   / HCO3: 22    / Base Excess: -2.4  /  SaO2: 98                      [All pertinent / recent available Imaging reports and other labs reviewed]     ---___---___---___---___---___---___ ---___---___---___---___---           <<<  A S S E S S M E N T   A N D   P L A N :  >>>          -GI/DVT Prophylaxis.    --------------------------------------------       >>______________________<<      Deniz Bolden .         phone   [phone]

## 2018-08-28 NOTE — PROGRESS NOTE ADULT - ATTENDING COMMENTS
please see the SICU note for full detail Very well known to me  S/p hip fx sec to osteoporosis due to ?steroid use s/p repair with subsequent surgical site infection and removal of hardware with placement of spacer device. The pt has persistent bacteremia with MRSA and was treated with Dapto and Ceftaroline followed by Vanco and meropenem. A PICC line was placed for long term IV access recently which was change sec to tip not in optimal position. She was also pegged.   She has been febrile for few days. CT A/P is neg for acute path  Now she is very hypotensive refractory to fluid challenge needs vasopressor support to maintain her hemodynamics. Panculture has been dose and started on broad spectrum abx. Her lactate is not high and does not have metabolic acidosis. mental status is poor but is able to protect her airway and does not require intubation currently.  Transferred to SICU.  at bedside updated.   She remains full code Pt very well known to me.  Responded to RRT  S/p hip fx sec to osteoporosis due to ?steroid use s/p repair with subsequent surgical site infection and removal of hardware with placement of spacer device. The pt has persistent bacteremia with MRSA and was treated with Dapto and Ceftaroline followed by Vanco and meropenem. A PICC line was placed for long term IV access recently which was change sec to tip not in optimal position. She was also pegged.   She has been febrile for last  few days. CT A/P is neg for acute path and PEG was found to be in place  Now she is very hypotensive refractory to fluid challenge needs vasopressor support to maintain her hemodynamics. Panculture has been dose and started on broad spectrum abx. Her lactate is not high and does not have metabolic acidosis. mental status is poor but is able to protect her airway and does not require intubation currently.  Transferred to SICU.  at bedside updated.   She remains full code

## 2018-08-28 NOTE — CHART NOTE - NSCHARTNOTEFT_GEN_A_CORE
Called to bedside as part of medical Rapid Response Team.  RRT called for hypotension. chart reviewed, pt examined in person at bedside. Pt found to be hypotensive not responding to IVF. Recurrent sepsis on background of MRSA bacteremia/superinfected R hip hematoma/hardware, s/p operative debridement for source control s/p spacer/plate placement. s/p broad spec abx with double and eventually mono MRSA coverage and empiric gnr/anaerob/pseudomonal coverage s/p 1 wk of meropenem. Plan to escalate to SICU for broad spec abx, pressor support, and possibly further surgical source control.  Refer to MAR or NP chart note for full RRT details. 35 minutes of critical care delivered in person at bedside as part of medical rapid response team.

## 2018-08-28 NOTE — PROCEDURE NOTE - NSPROCDETAILS_GEN_ALL_CORE
sterile dressing applied/sterile technique, catheter placed/ultrasound guidance/guidewire recovered/lumen(s) aspirated and flushed
sterile dressing applied/sterile technique, catheter placed/location identified, draped/prepped, sterile technique used/supine position
connected to a pressurized flush line/all materials/supplies accounted for at end of procedure/positive blood return obtained via catheter/sutured in place/hemostasis with direct pressure, dressing applied/Seldinger technique/location identified, draped/prepped, sterile technique used, needle inserted/introduced/ultrasound guidance
lumen(s) aspirated and flushed/ultrasound guidance/sterile dressing applied/guidewire recovered
positive blood return obtained via catheter/Seldinger technique/all materials/supplies accounted for at end of procedure/ultrasound guidance/sutured in place/location identified, draped/prepped, sterile technique used, needle inserted/introduced/connected to a pressurized flush line

## 2018-08-28 NOTE — PROCEDURE NOTE - NSSITEPREP_SKIN_A_CORE
Adherence to aseptic technique: hand hygiene prior to donning barriers (gown, gloves), don cap and mask, sterile drape over patient/chlorhexidine
chlorhexidine

## 2018-08-28 NOTE — CHART NOTE - NSCHARTNOTEFT_GEN_A_CORE
Rapid Response was called around 00:30 8/28/18 for hypotension, tachycardia, and lethargy. The patient has been febrile since 8/26 fever work up at that time had been negative, blood cultures still pending, CT A/P with no acute pathology, recent PICC line placed.    RR team, primary team evaluated the patient, labs were drawn, 1L bolus was given at a rapid rate, the SICU was consulted and the patient was started on pressors to maintain blood pressure as she was failing fluid challenge. The patient was transferred to the SICU where antibiotic coverage was broadened and pressors were continued.     -Care per sicu  -pressors as needed, wean as tolerated  -currently able to maintain airway without intubation, monitor resp status  -broad spectrum antibiotics  -considering PICC vs Asp pna as possible source of infection  -FU cultures

## 2018-08-28 NOTE — PROGRESS NOTE ADULT - SUBJECTIVE AND OBJECTIVE BOX
INTERVAL HPI/OVERNIGHT EVENTS: Events noted, on pressors, febrile, CT without contrast (IV or enteral) in good position    MEDICATIONS  (STANDING):  ALBUTerol/ipratropium for Nebulization 3 milliLiter(s) Nebulizer every 6 hours  buDESOnide   0.5 milliGRAM(s) Respule 0.5 milliGRAM(s) Inhalation every 12 hours  cefepime   IVPB      chlorhexidine 4% Liquid 1 Application(s) Topical <User Schedule>  dextrose 5% + lactated ringers. 1000 milliLiter(s) (100 mL/Hr) IV Continuous <Continuous>  enoxaparin Injectable 40 milliGRAM(s) SubCutaneous daily  insulin lispro (HumaLOG) corrective regimen sliding scale   SubCutaneous every 6 hours  lidocaine   Patch 1 Patch Transdermal every 24 hours  metroNIDAZOLE  IVPB      metroNIDAZOLE  IVPB 500 milliGRAM(s) IV Intermittent every 8 hours  pantoprazole  Injectable 40 milliGRAM(s) IV Push daily  vancomycin  IVPB 500 milliGRAM(s) IV Intermittent every 24 hours    MEDICATIONS  (PRN):  acetaminophen   Tablet. 975 milliGRAM(s) Oral every 8 hours PRN Mild Pain (1 - 3)  nystatin Powder 1 Application(s) Topical two times a day PRN rash/itchiness      Allergies    ASA; dye contrast (Anaphylaxis)  aspirin (Short breath)  divalproex sodium (Other (Unknown))  Haldol (Other (Unknown))  penicillin (Short breath; Rash)  sulfa drugs (Short breath; Rash)  Xanax (Other (Unknown))    Intolerances            PHYSICAL EXAM:   Vital Signs:  Vital Signs Last 24 Hrs  T(C): 38.4 (28 Aug 2018 15:00), Max: 39.1 (27 Aug 2018 21:18)  T(F): 101.1 (28 Aug 2018 15:00), Max: 102.3 (27 Aug 2018 21:18)  HR: 111 (28 Aug 2018 15:00) (89 - 120)  BP: 110/52 (28 Aug 2018 02:00) (90/48 - 145/78)  BP(mean): 75 (28 Aug 2018 02:00) (66 - 89)  RR: 38 (28 Aug 2018 15:00) (14 - 38)  SpO2: 100% (28 Aug 2018 15:00) (82% - 100%)  Daily     Daily Weight in k.7 (28 Aug 2018 00:30)    GENERAL:  no distress  HEENT:  NC/AT,  anicteric  CHEST:   no increased effort, breath sounds clear  HEART:  Regular rhythm  ABDOMEN:  Soft,pos BS, mild upper abd tenderness likely related to post PEG tenderness, non-distended, normoactive bowel sounds,  no masses ,PEG site intact  EXTEREMITIES:  no cyanosis      LABS:                        8.9    9.2   )-----------( 244      ( 28 Aug 2018 10:06 )             27.6         141  |  109<H>  |  22  ----------------------------<  131<H>  3.4<L>   |  21<L>  |  1.09    Ca    7.3<L>      28 Aug 2018 00:12  Phos  4.4       Mg     1.2         TPro  4.5<L>  /  Alb  1.5<L>  /  TBili  0.4  /  DBili  x   /  AST  11  /  ALT  8<L>  /  AlkPhos  136<H>      PT/INR - ( 28 Aug 2018 00:13 )   PT: 14.7 sec;   INR: 1.35 ratio         PTT - ( 28 Aug 2018 00:13 )  PTT:39.1 sec  Urinalysis Basic - ( 28 Aug 2018 03:39 )    Color: Yellow / Appearance: Clear / S.014 / pH: x  Gluc: x / Ketone: Negative  / Bili: Negative / Urobili: Negative   Blood: x / Protein: 30 mg/dL / Nitrite: Negative   Leuk Esterase: Negative / RBC: 10-25 /HPF / WBC 3-5 /HPF   Sq Epi: x / Non Sq Epi: x / Bacteria: Few /HPF        RADIOLOGY & ADDITIONAL TESTS:

## 2018-08-29 LAB
ALBUMIN SERPL ELPH-MCNC: 1.9 G/DL — LOW (ref 3.3–5)
ALP SERPL-CCNC: 149 U/L — HIGH (ref 40–120)
ALT FLD-CCNC: 9 U/L — LOW (ref 10–45)
ANION GAP SERPL CALC-SCNC: 14 MMOL/L — SIGNIFICANT CHANGE UP (ref 5–17)
APTT BLD: 38.6 SEC — HIGH (ref 27.5–37.4)
AST SERPL-CCNC: 15 U/L — SIGNIFICANT CHANGE UP (ref 10–40)
BILIRUB DIRECT SERPL-MCNC: 0.1 MG/DL — SIGNIFICANT CHANGE UP (ref 0–0.2)
BILIRUB INDIRECT FLD-MCNC: 0.3 MG/DL — SIGNIFICANT CHANGE UP (ref 0.2–1)
BILIRUB SERPL-MCNC: 0.4 MG/DL — SIGNIFICANT CHANGE UP (ref 0.2–1.2)
BUN SERPL-MCNC: 20 MG/DL — SIGNIFICANT CHANGE UP (ref 7–23)
CALCIUM SERPL-MCNC: 8.4 MG/DL — SIGNIFICANT CHANGE UP (ref 8.4–10.5)
CHLORIDE SERPL-SCNC: 109 MMOL/L — HIGH (ref 96–108)
CO2 SERPL-SCNC: 18 MMOL/L — LOW (ref 22–31)
CREAT SERPL-MCNC: 1.05 MG/DL — SIGNIFICANT CHANGE UP (ref 0.5–1.3)
GAS PNL BLDA: SIGNIFICANT CHANGE UP
GLUCOSE SERPL-MCNC: 176 MG/DL — HIGH (ref 70–99)
HCT VFR BLD CALC: 28.5 % — LOW (ref 34.5–45)
HGB BLD-MCNC: 9.1 G/DL — LOW (ref 11.5–15.5)
INR BLD: 1.22 RATIO — HIGH (ref 0.88–1.16)
MAGNESIUM SERPL-MCNC: 2.3 MG/DL — SIGNIFICANT CHANGE UP (ref 1.6–2.6)
MCHC RBC-ENTMCNC: 28.7 PG — SIGNIFICANT CHANGE UP (ref 27–34)
MCHC RBC-ENTMCNC: 32 GM/DL — SIGNIFICANT CHANGE UP (ref 32–36)
MCV RBC AUTO: 89.6 FL — SIGNIFICANT CHANGE UP (ref 80–100)
PHOSPHATE SERPL-MCNC: 3.8 MG/DL — SIGNIFICANT CHANGE UP (ref 2.5–4.5)
PLATELET # BLD AUTO: 279 K/UL — SIGNIFICANT CHANGE UP (ref 150–400)
POTASSIUM SERPL-MCNC: 4.3 MMOL/L — SIGNIFICANT CHANGE UP (ref 3.5–5.3)
POTASSIUM SERPL-SCNC: 4.3 MMOL/L — SIGNIFICANT CHANGE UP (ref 3.5–5.3)
PROT SERPL-MCNC: 5.1 G/DL — LOW (ref 6–8.3)
PROTHROM AB SERPL-ACNC: 13.4 SEC — HIGH (ref 9.8–12.7)
RBC # BLD: 3.18 M/UL — LOW (ref 3.8–5.2)
RBC # FLD: 13.9 % — SIGNIFICANT CHANGE UP (ref 10.3–14.5)
SODIUM SERPL-SCNC: 141 MMOL/L — SIGNIFICANT CHANGE UP (ref 135–145)
WBC # BLD: 12 K/UL — HIGH (ref 3.8–10.5)
WBC # FLD AUTO: 12 K/UL — HIGH (ref 3.8–10.5)

## 2018-08-29 PROCEDURE — 71045 X-RAY EXAM CHEST 1 VIEW: CPT | Mod: 26

## 2018-08-29 PROCEDURE — 99291 CRITICAL CARE FIRST HOUR: CPT

## 2018-08-29 RX ORDER — CLONAZEPAM 1 MG
1 TABLET ORAL ONCE
Qty: 0 | Refills: 0 | Status: DISCONTINUED | OUTPATIENT
Start: 2018-08-29 | End: 2018-08-29

## 2018-08-29 RX ORDER — PANTOPRAZOLE SODIUM 20 MG/1
40 TABLET, DELAYED RELEASE ORAL DAILY
Qty: 0 | Refills: 0 | Status: DISCONTINUED | OUTPATIENT
Start: 2018-08-29 | End: 2018-08-29

## 2018-08-29 RX ORDER — QUETIAPINE FUMARATE 200 MG/1
25 TABLET, FILM COATED ORAL ONCE
Qty: 0 | Refills: 0 | Status: COMPLETED | OUTPATIENT
Start: 2018-08-29 | End: 2018-08-29

## 2018-08-29 RX ORDER — ACETAMINOPHEN 500 MG
1000 TABLET ORAL ONCE
Qty: 0 | Refills: 0 | Status: COMPLETED | OUTPATIENT
Start: 2018-08-29 | End: 2018-08-29

## 2018-08-29 RX ORDER — QUETIAPINE FUMARATE 200 MG/1
25 TABLET, FILM COATED ORAL
Qty: 0 | Refills: 0 | Status: COMPLETED | OUTPATIENT
Start: 2018-08-29 | End: 2018-08-29

## 2018-08-29 RX ORDER — PANTOPRAZOLE SODIUM 20 MG/1
40 TABLET, DELAYED RELEASE ORAL EVERY 24 HOURS
Qty: 0 | Refills: 0 | Status: DISCONTINUED | OUTPATIENT
Start: 2018-08-29 | End: 2018-10-04

## 2018-08-29 RX ORDER — ERYTHROMYCIN ETHYLSUCCINATE 400 MG
500 TABLET ORAL EVERY 8 HOURS
Qty: 0 | Refills: 0 | Status: COMPLETED | OUTPATIENT
Start: 2018-08-29 | End: 2018-09-03

## 2018-08-29 RX ORDER — INSULIN LISPRO 100/ML
VIAL (ML) SUBCUTANEOUS EVERY 6 HOURS
Qty: 0 | Refills: 0 | Status: DISCONTINUED | OUTPATIENT
Start: 2018-08-29 | End: 2018-09-05

## 2018-08-29 RX ORDER — QUETIAPINE FUMARATE 200 MG/1
50 TABLET, FILM COATED ORAL
Qty: 0 | Refills: 0 | Status: DISCONTINUED | OUTPATIENT
Start: 2018-08-30 | End: 2018-09-10

## 2018-08-29 RX ORDER — MIRTAZAPINE 45 MG/1
45 TABLET, ORALLY DISINTEGRATING ORAL
Qty: 0 | Refills: 0 | Status: DISCONTINUED | OUTPATIENT
Start: 2018-08-29 | End: 2018-09-12

## 2018-08-29 RX ORDER — CLONAZEPAM 1 MG
2 TABLET ORAL
Qty: 0 | Refills: 0 | Status: DISCONTINUED | OUTPATIENT
Start: 2018-08-29 | End: 2018-09-05

## 2018-08-29 RX ORDER — SERTRALINE 25 MG/1
50 TABLET, FILM COATED ORAL DAILY
Qty: 0 | Refills: 0 | Status: DISCONTINUED | OUTPATIENT
Start: 2018-08-29 | End: 2018-10-01

## 2018-08-29 RX ADMIN — Medication 3 MILLILITER(S): at 05:29

## 2018-08-29 RX ADMIN — Medication 2: at 12:20

## 2018-08-29 RX ADMIN — Medication 1000 MILLIGRAM(S): at 08:15

## 2018-08-29 RX ADMIN — CHLORHEXIDINE GLUCONATE 1 APPLICATION(S): 213 SOLUTION TOPICAL at 05:01

## 2018-08-29 RX ADMIN — LIDOCAINE 1 PATCH: 4 CREAM TOPICAL at 09:24

## 2018-08-29 RX ADMIN — Medication 500 MILLIGRAM(S): at 17:04

## 2018-08-29 RX ADMIN — Medication 100 MILLIGRAM(S): at 22:23

## 2018-08-29 RX ADMIN — Medication 1 MILLIGRAM(S): at 09:13

## 2018-08-29 RX ADMIN — QUETIAPINE FUMARATE 25 MILLIGRAM(S): 200 TABLET, FILM COATED ORAL at 09:13

## 2018-08-29 RX ADMIN — Medication 500 MILLIGRAM(S): at 09:13

## 2018-08-29 RX ADMIN — Medication 3 MILLILITER(S): at 23:13

## 2018-08-29 RX ADMIN — QUETIAPINE FUMARATE 25 MILLIGRAM(S): 200 TABLET, FILM COATED ORAL at 22:23

## 2018-08-29 RX ADMIN — Medication 0.5 MILLIGRAM(S): at 23:13

## 2018-08-29 RX ADMIN — MIRTAZAPINE 45 MILLIGRAM(S): 45 TABLET, ORALLY DISINTEGRATING ORAL at 22:23

## 2018-08-29 RX ADMIN — Medication 0.5 MILLIGRAM(S): at 13:08

## 2018-08-29 RX ADMIN — ENOXAPARIN SODIUM 40 MILLIGRAM(S): 100 INJECTION SUBCUTANEOUS at 12:11

## 2018-08-29 RX ADMIN — Medication 100 MILLIGRAM(S): at 13:26

## 2018-08-29 RX ADMIN — Medication 3 MILLILITER(S): at 13:08

## 2018-08-29 RX ADMIN — Medication 2 MILLIGRAM(S): at 22:23

## 2018-08-29 RX ADMIN — Medication 3 MILLILITER(S): at 18:08

## 2018-08-29 RX ADMIN — Medication 400 MILLIGRAM(S): at 07:59

## 2018-08-29 RX ADMIN — Medication 1: at 05:53

## 2018-08-29 RX ADMIN — Medication 100 MILLIGRAM(S): at 05:23

## 2018-08-29 RX ADMIN — PANTOPRAZOLE SODIUM 40 MILLIGRAM(S): 20 TABLET, DELAYED RELEASE ORAL at 12:11

## 2018-08-29 RX ADMIN — LIDOCAINE 1 PATCH: 4 CREAM TOPICAL at 22:23

## 2018-08-29 RX ADMIN — Medication 10 MILLIGRAM(S): at 02:36

## 2018-08-29 RX ADMIN — Medication 100 MILLIGRAM(S): at 06:28

## 2018-08-29 NOTE — PROGRESS NOTE ADULT - SUBJECTIVE AND OBJECTIVE BOX
CC: f/u for MRSA bacteremia, infected R hip, resp failure    Patient somnolent, slow to arouse, no distress on Nasal high Flow    REVIEW OF SYSTEMS:  All other review of systems negative (Comprehensive ROS)- limited    Antimicrobials:  cefepime   IVPB   Day 2  metroNIDAZOLE  IVPB      metroNIDAZOLE  IVPB 500 milliGRAM(s) IV Intermittent every 8 hours  Day 2  vancomycin  IVPB 500 milliGRAM(s) IV Intermittent every 24 hours   Day     Other Medications Reviewed    Vital Signs Last 24 Hrs  T(F): 98.8 (29 Aug 2018 11:00), Max: 99.1 (28 Aug 2018 23:00)  HR: 92 (29 Aug 2018 17:00) (92 - 118)  BP: 120/66 (28 Aug 2018 19:30) (120/66 - 120/66)  BP(mean): 86 (28 Aug 2018 19:30) (86 - 86)  RR: 18 (29 Aug 2018 17:00) (18 - 44)  SpO2: 100% (29 Aug 2018 17:00) (83% - 100%)    PHYSICAL EXAM:  General: lethargic, no acute distress  Eyes:  anicteric, no conjunctival injection, no discharge  Oropharynx: no lesions or injection 	  Neck: supple, without adenopathy  Lungs: decreased BSs bases  Heart: regular rate and rhythm; no murmur, rubs or gallops  Abdomen: soft, nondistended, nontender, G tube site clean  Skin: no lesions  Extremities: no edema.  R hip wound clean and intact  Neurologic: lethargic, moves all extremities    LAB RESULTS:                        9.1    12.0  )-----------( 279      ( 29 Aug 2018 03:01 )             28.5       141  |  109<H>  |  20  ----------------------------<  176<H>  4.3   |  18<L>  |  1.05    Ca    8.4      29 Aug 2018 03:01  Phos  3.8       Mg     2.3         TPro  5.1<L>  /  Alb  1.9<L>  /  TBili  0.4  /  DBili  0.1  /  AST  15  /  ALT  9<L>  /  AlkPhos  149<H>          Urinalysis Basic - ( 28 Aug 2018 03:39 )    Color: Yellow / Appearance: Clear / S.014 / pH: x  Gluc: x / Ketone: Negative  / Bili: Negative / Urobili: Negative   Blood: x / Protein: 30 mg/dL / Nitrite: Negative   Leuk Esterase: Negative / RBC: 10-25 /HPF / WBC 3-5 /HPF   Sq Epi: x / Non Sq Epi: x / Bacteria: Few /HPF      MICROBIOLOGY:  RECENT CULTURES:  Culture - Blood (18 @ 05:35)    Specimen Source: .Blood Blood-Peripheral    Culture Results:   No growth to date.     @ 23:03 .Blood Blood     No growth to date.      RADIOLOGY REVIEWED:  Xray Chest 1 View-PORTABLE IMMEDIATE (18 @ 00:24) >  Unchanged moderate left pleural effusion.    CT Abdomen and Pelvis No Cont (18 @ 09:35) >  No acute intra abdominal pathology or collection.  Left acetabular fracture as at recent prior imaging.

## 2018-08-29 NOTE — PROGRESS NOTE ADULT - SUBJECTIVE AND OBJECTIVE BOX
Patient seen and examined. Pain controlled.    Physical exam  VS: see EMR  Gen: NAD  Right LE: Incision healing with granulation tissue - no erythema/drainage. +EHL/FHL/TA/GSC. SILT L3-S1. +Capillary refill brisk. Compartments soft and compressible.      68F s/p R hip PJI explant and placement of cement spacer with distal femur ORIF    Pain control  appreciate SICU care  NWB right LE  Flat foot weight bearing left LE

## 2018-08-29 NOTE — PROGRESS NOTE ADULT - ATTENDING COMMENTS
Dr. Arias (Attending Physician)    N - Seroquel and Clonazepam to start today. Restart anxiety medications and antipsychotic medications since patient is more awake and anxious this morning.  P - HFNC started overnight for tachypnea.  Pt. had possible aspiration with bilious GI output. CXR with left lower lobe collapse. Aggressive chest PT.  C - Restarted steroids. Septic Shock intermittently requiring   GI - Start PPI IV hold tube feeds. High residuals on the floor will start erythromycin since reglan may interact with her antipsychotic medications.   - D5 LR started for NPO.   Anemia - Stable decrease frequency of lab checks.  c/w vanc/cefepime/metronidazole for septic shock from suspected pna.      .criticalcare

## 2018-08-29 NOTE — PROGRESS NOTE ADULT - ASSESSMENT
ASSESSMENT:  MYRIAM HEATH is a 68y Female with history of asthma, CVA, pulmonary HTN presents with septic shock from MRSA bacteremia secondary to infected right hemiarthroplasty hardware s/p right hip I&D, explantation of right hemiarthroplasty, placement of antibiotic spacer, and right femur ORIF. Readmitted to SICU 8/27 with septic shock requiring pressors.      PLAN:  NEURO: h/o anxiety, depression, dementia; now lethargic but waking up  - Monitor mental status.   - Holding home regimen of Klonopin, Zoloft, Seroquel, Remeron, and melatonin 2/2 to lethargy  - Reintroduce home regimen when pt waking up  - Pain control with Tylenol and Lidoderm patch.    RESPIRATORY: h/o asthma  - Monitor pulse oximeter and ABGs.  - Out of bed to chair, incentive spirometry, and pulmonary toileting to prevent atelectasis.  - Home prednisone 10 mg daily, Pulmicort, and Duoneb for asthma.    CARDIOVASCULAR: septic shock, resolving  - Monitor vital signs.  - Reassess volume status and administer fluid as necessary    GI/NUTRITION: dysphagia  - NPO with Pivot via PEG at goal of 30 mL/hr  - Protonix for GERD.    GENITOURINARY/RENAL: CKD stage 2  - Monitor I&Os. Blackman.  - Monitor electrolytes and replete as necessary.  - KVO fluids.    HEMATOLOGIC: no acute issues  - Lovenox for VTE prophylaxis.    INFECTIOUS DISEASE: MRSA bacteremia from infected right hip, Candiduria, & PNA, resolved  - Monitor WBC, temperature, and procalcitonin.  - Vancomycin for MRSA bacteremia   - Cefepime for empiric antibiotics  - Follow up cultures    ENDOCRINE: hyperglycemia  - ISS q 6      -Denia Maddox, PAC  [id number]

## 2018-08-29 NOTE — PROGRESS NOTE ADULT - ASSESSMENT
69 yo female with dementia, history of UC, and s/p R Hip hemiarthroplasty 6/22/18, post op hematoma  Admitted after left hip fx, noted severe sepsis, MRSA bacteremia, infected R hip- s/p GABRIEL and spacer  Stormy post op course with subsequent episode of sepsis- unclear source, resolved   S/p PEG and febrile again, transient low bp with no source apparent again.   Covered with Cefepime and Flagyl, along with Vanco  Now afebrile, surveillance Bld Cxs still negative, CT ABD negative, CXR small effusion only    Plan:  Continue vanco (will accept latest trough of 14.8), cefepime and flagyl  Follow temps and CBC/diff   Check doppler LEs   Support as outlined

## 2018-08-29 NOTE — CHART NOTE - NSCHARTNOTEFT_GEN_A_CORE
Pt seen for nutrition follow up, as per department protocol.     Source: Patient [X ]    Family [  ]     other [X ]; medical record.  Pt noted with AMS/Alzheimer's, currently on high flow nasal cannula.    Hospital Course: Pt with history of dementia, presenting with septic shock from MRSA bacteremia secondary to infected right hemiarthroplasty hardware S/P right hip I&D, explantation of right hemiarthroplasty, placement of antibiotic spacer, and right femur ORIF on 8/4. Pt reintubated for second time on 8/11.    Pt S/P PEG 8/24. EN resumed via PEG on 8/28 after rapid response; pt vomited, EN on hold and PEG placed to gravity drain      Diet : NPO     GI: +BM 8/28 (liquid). PEG to gravity drain, 250ml output.     Enteral /Parenteral Nutrition: Pivot1.5 via NGT @ 30ml/hr x 24 hours to provide 720ml formula, 1080cal/day, 68Gm protein/day, 546ml free water; meets 21cal/Kg and 1.3Gm protein/Kg per dosing wt 50.4Kg.    24-hour EN provision = 320ml, on hold for emesis    Current Weight: Weight (kg): 55.1Kg (8/29), 59.8Kg (8/14), 50.4Kg (7/27 dosing); UBW per  = 45.5Kg (100 pounds)  Edema: 1+ generalized    Pertinent Medications: MEDICATIONS  (STANDING):  ALBUTerol/ipratropium for Nebulization 3 milliLiter(s) Nebulizer every 6 hours  buDESOnide   0.5 milliGRAM(s) Respule 0.5 milliGRAM(s) Inhalation every 12 hours  cefepime   IVPB 1000 milliGRAM(s) IV Intermittent every 24 hours  cefepime   IVPB      chlorhexidine 4% Liquid 1 Application(s) Topical <User Schedule>  clonazePAM Tablet 2 milliGRAM(s) Oral <User Schedule>  dextrose 5% + lactated ringers. 1000 milliLiter(s) (100 mL/Hr) IV Continuous <Continuous>  enoxaparin Injectable 40 milliGRAM(s) SubCutaneous daily  erythromycin    ethylsuccinate Suspension 40 mG/mL 500 milliGRAM(s) Oral every 8 hours  insulin lispro (HumaLOG) corrective regimen sliding scale   SubCutaneous every 6 hours  lidocaine   Patch 1 Patch Transdermal every 24 hours  methylPREDNISolone sodium succinate Injectable 8 milliGRAM(s) IV Push every 24 hours  metroNIDAZOLE  IVPB      metroNIDAZOLE  IVPB 500 milliGRAM(s) IV Intermittent every 8 hours  mirtazapine 45 milliGRAM(s) Oral <User Schedule>  pantoprazole  Injectable 40 milliGRAM(s) IV Push every 24 hours  QUEtiapine 25 milliGRAM(s) Oral <User Schedule>  sertraline 50 milliGRAM(s) Oral daily  vancomycin  IVPB 500 milliGRAM(s) IV Intermittent every 24 hours    MEDICATIONS  (PRN):  nystatin Powder 1 Application(s) Topical two times a day PRN rash/itchiness    Pertinent Labs:  08-29 Na141 mmol/L Glu 176 mg/dL<H> K+ 4.3 mmol/L Cr  1.05 mg/dL BUN 20 mg/dL 08-29 Phos 3.8 mg/dL 08-29 Alb 1.9 g/dL<L>    POCT Blood Glucose.: 162 mg/dL (29 Aug 2018 05:46)  POCT Blood Glucose.: 160 mg/dL (28 Aug 2018 23:26)  POCT Blood Glucose.: 152 mg/dL (28 Aug 2018 17:42)  POCT Blood Glucose.: 149 mg/dL (28 Aug 2018 12:58)    Skin: suspected DTI left/right heels    Estimated Needs:   5474-2670 tere/day (25-30cal/Kg per dosing wt 50.4Kg with consideration for extubation)  71-80 Gm protein/day (1.4-1.6Gm/Kg per dosing wt 50.4Kg with consideration for pressure injuries)    Previous Nutrition Diagnosis:    [X ] Increased Nutrient Needs     Nutrition Diagnosis is [X ] ongoing, addressed with PEG, EN as tolerated    New Nutrition Diagnosis: [ X] not applicable    Interventions:     Recommend:  1) Increase EN to Pivot1.5 via PEG to @ 35ml/hr x 24 hours to provide 840ml formula, 59698yev/day, 79Gm protein/day, 638ml free water; meets 25cal/Kg and 1.6Gm protein/Kg per dosing wt 50.4Kg, with consideration for extubation and pressure injuries  2) Monitor weight, lab values, skin, nutrition provision and GI tolerance    Monitoring and Evaluation:   Follow up per protocol  RD to remain available for further nutritional interventions as indicated.   Cyndi Haddad, MS RD [facility], #[phone]. Pt seen for nutrition follow up, as per department protocol.     Source: Patient [X ]    Family [  ]     other [X ]; medical record.  Pt noted with AMS/Alzheimer's, currently on high flow nasal cannula.    Hospital Course: Pt with history of asthma, CVA, pulmonary HTN presents with septic shock from MRSA bacteremia secondary to infected right hemiarthroplasty hardware S/P right hip I&D, explantation of right hemiarthroplasty, placement of antibiotic spacer, and right femur ORIF. Readmitted to SICU 8/27 with septic shock requiring pressors.    Pt S/P PEG 8/24. EN resumed via PEG on 8/28; pt vomited, made NPO and PEG placed to gravity drain    Diet : NPO     GI: +BM 8/28 (liquid). PEG to gravity drain, 250ml output.     Enteral /Parenteral Nutrition: Pivot1.5 via NGT @ 30ml/hr x 24 hours to provide 720ml formula, 1080cal/day, 68Gm protein/day, 546ml free water; meets 21cal/Kg and 1.3Gm protein/Kg per dosing wt 50.4Kg.    24-hour EN provision = 320ml, on hold for emesis    Current Weight: Weight (kg): 55.1Kg (8/29), 59.8Kg (8/14), 50.4Kg (7/27 dosing); UBW per  = 45.5Kg (100 pounds)  Edema: 1+ generalized    Pertinent Medications: MEDICATIONS  (STANDING):  ALBUTerol/ipratropium for Nebulization 3 milliLiter(s) Nebulizer every 6 hours  buDESOnide   0.5 milliGRAM(s) Respule 0.5 milliGRAM(s) Inhalation every 12 hours  cefepime   IVPB 1000 milliGRAM(s) IV Intermittent every 24 hours  cefepime   IVPB      chlorhexidine 4% Liquid 1 Application(s) Topical <User Schedule>  clonazePAM Tablet 2 milliGRAM(s) Oral <User Schedule>  dextrose 5% + lactated ringers. 1000 milliLiter(s) (100 mL/Hr) IV Continuous <Continuous>  enoxaparin Injectable 40 milliGRAM(s) SubCutaneous daily  erythromycin    ethylsuccinate Suspension 40 mG/mL 500 milliGRAM(s) Oral every 8 hours  insulin lispro (HumaLOG) corrective regimen sliding scale   SubCutaneous every 6 hours  lidocaine   Patch 1 Patch Transdermal every 24 hours  methylPREDNISolone sodium succinate Injectable 8 milliGRAM(s) IV Push every 24 hours  metroNIDAZOLE  IVPB      metroNIDAZOLE  IVPB 500 milliGRAM(s) IV Intermittent every 8 hours  mirtazapine 45 milliGRAM(s) Oral <User Schedule>  pantoprazole  Injectable 40 milliGRAM(s) IV Push every 24 hours  QUEtiapine 25 milliGRAM(s) Oral <User Schedule>  sertraline 50 milliGRAM(s) Oral daily  vancomycin  IVPB 500 milliGRAM(s) IV Intermittent every 24 hours    MEDICATIONS  (PRN):  nystatin Powder 1 Application(s) Topical two times a day PRN rash/itchiness    Pertinent Labs:  08-29 Na141 mmol/L Glu 176 mg/dL<H> K+ 4.3 mmol/L Cr  1.05 mg/dL BUN 20 mg/dL 08-29 Phos 3.8 mg/dL 08-29 Alb 1.9 g/dL<L>    POCT Blood Glucose.: 162 mg/dL (29 Aug 2018 05:46)  POCT Blood Glucose.: 160 mg/dL (28 Aug 2018 23:26)  POCT Blood Glucose.: 152 mg/dL (28 Aug 2018 17:42)  POCT Blood Glucose.: 149 mg/dL (28 Aug 2018 12:58)    Skin: suspected DTI left/right heels    Estimated Needs:   3574-4595 tere/day (25-30cal/Kg per dosing wt 50.4Kg with consideration for extubation)  71-80 Gm protein/day (1.4-1.6Gm/Kg per dosing wt 50.4Kg with consideration for pressure injuries)    Previous Nutrition Diagnosis:    [X ] Increased Nutrient Needs     Nutrition Diagnosis is [X ] ongoing, addressed with PEG, EN as tolerated    New Nutrition Diagnosis: [ X] not applicable    Interventions:     Recommend:  1) Increase EN to Pivot1.5 via PEG to @ 35ml/hr x 24 hours to provide 840ml formula, 57286gxz/day, 79Gm protein/day, 638ml free water; meets 25cal/Kg and 1.6Gm protein/Kg per dosing wt 50.4Kg, with consideration for extubation and pressure injuries  2) Monitor weight, lab values, skin, nutrition provision and GI tolerance    Monitoring and Evaluation:   Follow up per protocol  RD to remain available for further nutritional interventions as indicated.   Cyndi Haddad, MS RD [facility], #[phone]. Pt seen for nutrition follow up, as per department protocol.     Source: Patient [X ]    Family [  ]     other [X ]; medical record.  Pt noted with AMS/Alzheimer's, currently on high flow nasal cannula.    Hospital Course: Pt with history of asthma, CVA, pulmonary HTN presents with septic shock from MRSA bacteremia secondary to infected right hemiarthroplasty hardware S/P right hip I&D, explantation of right hemiarthroplasty, placement of antibiotic spacer, and right femur ORIF. Readmitted to SICU 8/27 with septic shock requiring pressors.    Pt S/P PEG 8/24. EN resumed via PEG on 8/28; pt vomited, made NPO and PEG placed to gravity drain    Diet : NPO     GI: +BM 8/28 (liquid). PEG to gravity drain, 250ml output.     Enteral /Parenteral Nutrition: Pivot1.5 via NGT @ 30ml/hr x 24 hours to provide 720ml formula, 1080cal/day, 68Gm protein/day, 546ml free water; meets 21cal/Kg and 1.3Gm protein/Kg per dosing wt 50.4Kg.    24-hour EN provision = 320ml, on hold for emesis    Current Weight: Weight (kg): 55.1Kg (8/29), 59.8Kg (8/14), 50.4Kg (7/27 dosing); UBW per  = 45.5Kg (100 pounds)  Edema: 1+ generalized    Pertinent Medications: MEDICATIONS  (STANDING):  ALBUTerol/ipratropium for Nebulization 3 milliLiter(s) Nebulizer every 6 hours  buDESOnide   0.5 milliGRAM(s) Respule 0.5 milliGRAM(s) Inhalation every 12 hours  cefepime   IVPB 1000 milliGRAM(s) IV Intermittent every 24 hours  cefepime   IVPB      chlorhexidine 4% Liquid 1 Application(s) Topical <User Schedule>  clonazePAM Tablet 2 milliGRAM(s) Oral <User Schedule>  dextrose 5% + lactated ringers. 1000 milliLiter(s) (100 mL/Hr) IV Continuous <Continuous>  enoxaparin Injectable 40 milliGRAM(s) SubCutaneous daily  erythromycin    ethylsuccinate Suspension 40 mG/mL 500 milliGRAM(s) Oral every 8 hours  insulin lispro (HumaLOG) corrective regimen sliding scale   SubCutaneous every 6 hours  lidocaine   Patch 1 Patch Transdermal every 24 hours  methylPREDNISolone sodium succinate Injectable 8 milliGRAM(s) IV Push every 24 hours  metroNIDAZOLE  IVPB      metroNIDAZOLE  IVPB 500 milliGRAM(s) IV Intermittent every 8 hours  mirtazapine 45 milliGRAM(s) Oral <User Schedule>  pantoprazole  Injectable 40 milliGRAM(s) IV Push every 24 hours  QUEtiapine 25 milliGRAM(s) Oral <User Schedule>  sertraline 50 milliGRAM(s) Oral daily  vancomycin  IVPB 500 milliGRAM(s) IV Intermittent every 24 hours    MEDICATIONS  (PRN):  nystatin Powder 1 Application(s) Topical two times a day PRN rash/itchiness    Pertinent Labs:  08-29 Na141 mmol/L Glu 176 mg/dL<H> K+ 4.3 mmol/L Cr  1.05 mg/dL BUN 20 mg/dL 08-29 Phos 3.8 mg/dL 08-29 Alb 1.9 g/dL<L>    POCT Blood Glucose.: 162 mg/dL (29 Aug 2018 05:46)  POCT Blood Glucose.: 160 mg/dL (28 Aug 2018 23:26)  POCT Blood Glucose.: 152 mg/dL (28 Aug 2018 17:42)  POCT Blood Glucose.: 149 mg/dL (28 Aug 2018 12:58)    Skin: suspected DTI left/right heels    Estimated Needs:   7454-6384 tere/day (25-30cal/Kg per dosing wt 50.4Kg with consideration for extubation)  71-80 Gm protein/day (1.4-1.6Gm/Kg per dosing wt 50.4Kg with consideration for pressure injuries)    Previous Nutrition Diagnosis:    [X ] Increased Nutrient Needs     Nutrition Diagnosis is [X ] ongoing, addressed with PEG, EN as tolerated    New Nutrition Diagnosis: [ X] not applicable    Interventions:     Recommend:  1) Increase EN to Pivot1.5 via PEG to @ 35ml/hr x 24 hours to provide 840ml formula, 1260cal/day, 79Gm protein/day, 638ml free water; meets 25cal/Kg and 1.6Gm protein/Kg per dosing wt 50.4Kg, with consideration for extubation and pressure injuries  2) Monitor weight, lab values, skin, nutrition provision and GI tolerance    Monitoring and Evaluation:   Follow up per protocol  RD to remain available for further nutritional interventions as indicated.   Cyndi Haddad, MS RD [facility], #[phone].

## 2018-08-29 NOTE — PROGRESS NOTE ADULT - SUBJECTIVE AND OBJECTIVE BOX
MYRIAM WHITE:[id number],   68yFemale followed for:  ASA; dye contrast (Anaphylaxis)  aspirin (Short breath)  divalproex sodium (Other (Unknown))  Haldol (Other (Unknown))  penicillin (Short breath; Rash)  sulfa drugs (Short breath; Rash)  Xanax (Other (Unknown))    PAST MEDICAL & SURGICAL HISTORY:  CVA (cerebral vascular accident)  Fatty pancreas  PNA (pneumonia)  Pulmonary HTN  IGT (impaired glucose tolerance)  Ulcerative colitis  Acid reflux  Anxiety  Depression  Mouth sores  HLD (hyperlipidemia)  Asthma  Humeral head fracture  H/O: hysterectomy  H/O cataract extraction, left  History of knee replacement    FAMILY HISTORY:  Family history of dementia (Grandparent)  Family history of colon cancer (Grandparent)    MEDICATIONS  (STANDING):  ALBUTerol/ipratropium for Nebulization 3 milliLiter(s) Nebulizer every 6 hours  buDESOnide   0.5 milliGRAM(s) Respule 0.5 milliGRAM(s) Inhalation every 12 hours  cefepime   IVPB 1000 milliGRAM(s) IV Intermittent every 24 hours  cefepime   IVPB      chlorhexidine 4% Liquid 1 Application(s) Topical <User Schedule>  clonazePAM Tablet 2 milliGRAM(s) Oral <User Schedule>  clonazePAM Tablet 1 milliGRAM(s) Oral once  dextrose 5% + lactated ringers. 1000 milliLiter(s) (100 mL/Hr) IV Continuous <Continuous>  enoxaparin Injectable 40 milliGRAM(s) SubCutaneous daily  erythromycin    ethylsuccinate Suspension 40 mG/mL 500 milliGRAM(s) Oral every 8 hours  insulin lispro (HumaLOG) corrective regimen sliding scale   SubCutaneous every 6 hours  lidocaine   Patch 1 Patch Transdermal every 24 hours  methylPREDNISolone sodium succinate Injectable 8 milliGRAM(s) IV Push every 24 hours  metroNIDAZOLE  IVPB      metroNIDAZOLE  IVPB 500 milliGRAM(s) IV Intermittent every 8 hours  mirtazapine 45 milliGRAM(s) Oral <User Schedule>  pantoprazole  Injectable 40 milliGRAM(s) IV Push every 24 hours  QUEtiapine 25 milliGRAM(s) Oral <User Schedule>  QUEtiapine 25 milliGRAM(s) Oral once  sertraline 50 milliGRAM(s) Oral daily  vancomycin  IVPB 500 milliGRAM(s) IV Intermittent every 24 hours    MEDICATIONS  (PRN):  nystatin Powder 1 Application(s) Topical two times a day PRN rash/itchiness      Vital Signs Last 24 Hrs  T(C): 37.1 (29 Aug 2018 07:00), Max: 38.4 (28 Aug 2018 15:00)  T(F): 98.8 (29 Aug 2018 07:00), Max: 101.1 (28 Aug 2018 15:00)  HR: 114 (29 Aug 2018 08:30) (96 - 118)  BP: 120/66 (28 Aug 2018 19:30) (120/66 - 120/66)  BP(mean): 86 (28 Aug 2018 19:30) (86 - 86)  RR: 36 (29 Aug 2018 08:30) (21 - 44)  SpO2: 100% (29 Aug 2018 08:30) (73% - 100%)  nc/at  s1s2  cta  soft, nt, nd no guarding or rebound  no c/c/e    CBC Full  -  ( 29 Aug 2018 03:01 )  WBC Count : 12.0 K/uL  Hemoglobin : 9.1 g/dL  Hematocrit : 28.5 %  Platelet Count - Automated : 279 K/uL  Mean Cell Volume : 89.6 fl  Mean Cell Hemoglobin : 28.7 pg  Mean Cell Hemoglobin Concentration : 32.0 gm/dL  Auto Neutrophil # : x  Auto Lymphocyte # : x  Auto Monocyte # : x  Auto Eosinophil # : x  Auto Basophil # : x  Auto Neutrophil % : x  Auto Lymphocyte % : x  Auto Monocyte % : x  Auto Eosinophil % : x  Auto Basophil % : x    08-29    141  |  109<H>  |  20  ----------------------------<  176<H>  4.3   |  18<L>  |  1.05    Ca    8.4      29 Aug 2018 03:01  Phos  3.8     08-29  Mg     2.3     08-29    TPro  5.1<L>  /  Alb  1.9<L>  /  TBili  0.4  /  DBili  0.1  /  AST  15  /  ALT  9<L>  /  AlkPhos  149<H>  08-29    PT/INR - ( 29 Aug 2018 03:01 )   PT: 13.4 sec;   INR: 1.22 ratio         PTT - ( 29 Aug 2018 03:01 )  PTT:38.6 sec

## 2018-08-29 NOTE — PROGRESS NOTE ADULT - SUBJECTIVE AND OBJECTIVE BOX
24h: Pt weaned off phenylephrine during the day, had to go back on overnight but was weaned off again. She was restarted on Pivot @ 30 ml/hr via PEG. Prednisone 10mg restarted overnight (home dose). 24 HOUR EVENTS: Pt weaned off phenylephrine during the day, had to go back on overnight but was weaned off again. She was restarted on Pivot @ 30 ml/hr via PEG. Prednisone 10mg restarted overnight (home dose). Pt was lethargic throughout the day but overnight she woke up and was restless.    HISTORY  MYRIAM HEATH is a 68y Female with history of asthma, CVA, pulmonary HTN s/p hip fx sec to osteoporosis due to ?steroid use s/p repair with subsequent surgical site infection and removal of hardware (7/27) with placement of antibiotic spacer device and right femur ORIF (8/4). The pt had persistent bacteremia with MRSA and was treated with Daptomycin and Ceftaroline followed by Vancomycin and meropenem. A PICC line was placed 8/22 for long term IV access recently which was changed 8/24 secondary to tip not in optimal position. She was also pegged on 8/24. She developed fevers on the floor and a CT A/P was performed 8/27 which was neg for acute path and PEG was found to be in place. She returned to SICU 8/27 after she was found to be hypotensive refractory to fluid challenge needs vasopressor support to maintain her hemodynamics. Panculture has been dose and started on broad spectrum antibiotics.      SUBJECTIVE/ROS:  [ x] A ten-point review of systems was otherwise negative except as noted.  [ ] Due to altered mental status/intubation, subjective information were not able to be obtained from the patient. History was obtained, to the extent possible, from review of the chart and collateral sources of information.      NEURO  RASS: +1      Exam: awake, alert, oriented to self and location  Meds: acetaminophen   Tablet. 975 milliGRAM(s) Oral every 8 hours PRN Mild Pain (1 - 3)    [x] Adequacy of sedation and pain control has been assessed and adjusted      RESPIRATORY  RR: 32 (08-29-18 @ 03:45) (19 - 44)  SpO2: 95% (08-29-18 @ 03:45) (73% - 100%)  Exam: unlabored, clear to auscultation bilaterally  ABG - ( 29 Aug 2018 02:32 )  pH: 7.48  /  pCO2: 28    /  pO2: 83    / HCO3: 21    / Base Excess: -1.4  /  SaO2: 98      Blood Gas Arterial, Lactate: 1.5 mmol/L (08.29.18 @ 02:32)    [n/a ] Extubation Readiness Assessed  Meds: ALBUTerol/ipratropium for Nebulization 3 milliLiter(s) Nebulizer every 6 hours  buDESOnide   0.5 milliGRAM(s) Respule 0.5 milliGRAM(s) Inhalation every 12 hours        CARDIOVASCULAR  HR: 110 (08-29-18 @ 03:45) (89 - 114)  BP: 120/66 (08-28-18 @ 19:30) (120/66 - 120/66)  BP(mean): 86 (08-28-18 @ 19:30) (86 - 86)  ABP: 161/67 (08-29-18 @ 03:45) (92/37 - 173/74)  ABP(mean): 106 (08-29-18 @ 03:45) (58 - 115)    Exam: regular rhythm, tachycardic   Cardiac Rhythm: sinus tachycardia  Perfusion     [x ]Adequate   [ ]Inadequate  Mentation   [ x]Normal       [ ]Reduced  Extremities  [ x]Warm         [ ]Cool  Volume Status [ ]Hypervolemic [x ]Euvolemic [ ]Hypovolemic  Meds: x      GI/NUTRITION  Exam: soft, nontender, nondistended, PEG in place  Diet: NPO with Pivot @ 30ml/hr  Meds: pantoprazole   Suspension 40 milliGRAM(s) Oral daily      GENITOURINARY  I&O's Detail    08-27 @ 07:01  -  08-28 @ 07:00  --------------------------------------------------------  IN:    0.9% NaCl: 100 mL    dextrose 5% + lactated ringers.: 700 mL    dextrose 5% + sodium chloride 0.45% with potassium chloride 20 mEq/L: 1130 mL    Enteral Tube Flush: 90 mL    IV PiggyBack: 200 mL    Lactated Ringers IV Bolus: 500 mL    Packed Red Blood Cells: 350 mL    phenylephrine   Infusion: 39.7 mL    Pivot: 40 mL    Solution: 300 mL    Solution: 200 mL  Total IN: 3649.7 mL    OUT:    Indwelling Catheter - Urethral: 510 mL    Voided: 200 mL  Total OUT: 710 mL    Total NET: 2939.7 mL      08-28 @ 07:01  -  08-29 @ 04:11  --------------------------------------------------------  IN:    dextrose 5% + lactated ringers.: 2000 mL    phenylephrine   Infusion: 26.6 mL    Pivot: 290 mL    Solution: 300 mL  Total IN: 2616.6 mL    OUT:    Indwelling Catheter - Urethral: 1210 mL  Total OUT: 1210 mL    Total NET: 1406.6 mL      08-29    141  |  109<H>  |  20  ----------------------------<  176<H>  4.3   |  18<L>  |  1.05    Ca    8.4      29 Aug 2018 03:01  Phos  3.8     08-29  Mg     2.3     08-29    TPro  5.1<L>  /  Alb  1.9<L>  /  TBili  0.4  /  DBili  0.1  /  AST  15  /  ALT  9<L>  /  AlkPhos  149<H>  08-29    [x ] Blackman catheter, indication: critically ill  Meds: dextrose 5% + lactated ringers. 1000 milliLiter(s) IV Continuous <Continuous>        HEMATOLOGIC  Meds: enoxaparin Injectable 40 milliGRAM(s) SubCutaneous daily    [x] VTE Prophylaxis                        9.1    12.0  )-----------( 279      ( 29 Aug 2018 03:01 )             28.5     PT/INR - ( 29 Aug 2018 03:01 )   PT: 13.4 sec;   INR: 1.22 ratio         PTT - ( 29 Aug 2018 03:01 )  PTT:38.6 sec  Transfusion     [ ] PRBC   [ ] Platelets   [ ] FFP   [ ] Cryoprecipitate      INFECTIOUS DISEASES  T(C): 36.5 (08-29-18 @ 03:00), Max: 38.4 (08-28-18 @ 15:00)  WBC Count: 12.0 K/uL (08-29 @ 03:01)  WBC Count: 9.8 K/uL (08-28 @ 19:41)  WBC Count: 9.2 K/uL (08-28 @ 10:06)  WBC Count: 10.5 K/uL (08-28 @ 04:49)    Recent Cultures:  Specimen Source: .Blood Blood, 08-28 @ 02:49; Results   No growth to date.; Gram Stain: --; Organism: --  Specimen Source: .Blood Blood, 08-26 @ 23:03; Results   No growth to date.; Gram Stain: --; Organism: --    Meds: cefepime   IVPB 1000 milliGRAM(s) IV Intermittent every 24 hours  cefepime   IVPB      metroNIDAZOLE  IVPB      metroNIDAZOLE  IVPB 500 milliGRAM(s) IV Intermittent every 8 hours  vancomycin  IVPB 500 milliGRAM(s) IV Intermittent every 24 hours        ENDOCRINE  Capillary Blood Glucose  POCT Blood Glucose.: 160 mg/dL (28 Aug 2018 23:26)  POCT Blood Glucose.: 152 mg/dL (28 Aug 2018 17:42)  POCT Blood Glucose.: 149 mg/dL (28 Aug 2018 12:58)  POCT Blood Glucose.: 167 mg/dL (28 Aug 2018 05:14)    Meds: insulin lispro (HumaLOG) corrective regimen sliding scale   SubCutaneous every 6 hours  predniSONE   Tablet 10 milliGRAM(s) Oral every 24 hours        ACCESS DEVICES:  [ x] Peripheral IV  [ ] Central Venous Line	[ ] R	[ ] L	[ ] IJ	[ ] Fem	[ ] SC	Placed:   [x ] Arterial Line		[x ] R	[ ] L	[x ] Fem	[ ] Rad	[ ] Ax	Placed: 8/28  [x ] PICC:	L basilic 8/24				[ ] Mediport  [x ] Urinary Catheter, Date Placed: 8/28  [x ] Necessity of urinary, arterial, and venous catheters discussed    OTHER MEDICATIONS:  chlorhexidine 4% Liquid 1 Application(s) Topical <User Schedule>  lidocaine   Patch 1 Patch Transdermal every 24 hours  nystatin Powder 1 Application(s) Topical two times a day PRN      CODE STATUS: full code    IMAGING: x 24 HOUR EVENTS: Pt weaned off phenylephrine during the day, had to go back on overnight but was weaned off again. She was restarted on Pivot @ 30 ml/hr via PEG. Prednisone 10mg restarted overnight (home dose). Pt was lethargic throughout the day but overnight she woke up and was restless. This morning she vomited and she was made NPO.    HISTORY  MYRIAM HEATH is a 68y Female with history of asthma, CVA, pulmonary HTN s/p hip fx sec to osteoporosis due to ?steroid use s/p repair with subsequent surgical site infection and removal of hardware (7/27) with placement of antibiotic spacer device and right femur ORIF (8/4). The pt had persistent bacteremia with MRSA and was treated with Daptomycin and Ceftaroline followed by Vancomycin and meropenem. A PICC line was placed 8/22 for long term IV access recently which was changed 8/24 secondary to tip not in optimal position. She was also pegged on 8/24. She developed fevers on the floor and a CT A/P was performed 8/27 which was neg for acute path and PEG was found to be in place. She returned to SICU 8/27 after she was found to be hypotensive refractory to fluid challenge needs vasopressor support to maintain her hemodynamics. Panculture has been dose and started on broad spectrum antibiotics.      SUBJECTIVE/ROS:  [ x] A ten-point review of systems was otherwise negative except as noted.  [ ] Due to altered mental status/intubation, subjective information were not able to be obtained from the patient. History was obtained, to the extent possible, from review of the chart and collateral sources of information.      NEURO  RASS: +1      Exam: awake, alert, oriented to self and location  Meds: acetaminophen   Tablet. 975 milliGRAM(s) Oral every 8 hours PRN Mild Pain (1 - 3)    [x] Adequacy of sedation and pain control has been assessed and adjusted      RESPIRATORY  RR: 32 (08-29-18 @ 03:45) (19 - 44)  SpO2: 95% (08-29-18 @ 03:45) (73% - 100%)  Exam: unlabored, clear to auscultation bilaterally  ABG - ( 29 Aug 2018 02:32 )  pH: 7.48  /  pCO2: 28    /  pO2: 83    / HCO3: 21    / Base Excess: -1.4  /  SaO2: 98      Blood Gas Arterial, Lactate: 1.5 mmol/L (08.29.18 @ 02:32)    [n/a ] Extubation Readiness Assessed  Meds: ALBUTerol/ipratropium for Nebulization 3 milliLiter(s) Nebulizer every 6 hours  buDESOnide   0.5 milliGRAM(s) Respule 0.5 milliGRAM(s) Inhalation every 12 hours        CARDIOVASCULAR  HR: 110 (08-29-18 @ 03:45) (89 - 114)  BP: 120/66 (08-28-18 @ 19:30) (120/66 - 120/66)  BP(mean): 86 (08-28-18 @ 19:30) (86 - 86)  ABP: 161/67 (08-29-18 @ 03:45) (92/37 - 173/74)  ABP(mean): 106 (08-29-18 @ 03:45) (58 - 115)    Exam: regular rhythm, tachycardic   Cardiac Rhythm: sinus tachycardia  Perfusion     [x ]Adequate   [ ]Inadequate  Mentation   [ x]Normal       [ ]Reduced  Extremities  [ x]Warm         [ ]Cool  Volume Status [ ]Hypervolemic [x ]Euvolemic [ ]Hypovolemic  Meds: x      GI/NUTRITION  Exam: soft, nontender, nondistended, PEG in place  Diet: NPO with Pivot @ 30ml/hr  Meds: pantoprazole   Suspension 40 milliGRAM(s) Oral daily      GENITOURINARY  I&O's Detail    08-27 @ 07:01  -  08-28 @ 07:00  --------------------------------------------------------  IN:    0.9% NaCl: 100 mL    dextrose 5% + lactated ringers.: 700 mL    dextrose 5% + sodium chloride 0.45% with potassium chloride 20 mEq/L: 1130 mL    Enteral Tube Flush: 90 mL    IV PiggyBack: 200 mL    Lactated Ringers IV Bolus: 500 mL    Packed Red Blood Cells: 350 mL    phenylephrine   Infusion: 39.7 mL    Pivot: 40 mL    Solution: 300 mL    Solution: 200 mL  Total IN: 3649.7 mL    OUT:    Indwelling Catheter - Urethral: 510 mL    Voided: 200 mL  Total OUT: 710 mL    Total NET: 2939.7 mL      08-28 @ 07:01  -  08-29 @ 04:11  --------------------------------------------------------  IN:    dextrose 5% + lactated ringers.: 2000 mL    phenylephrine   Infusion: 26.6 mL    Pivot: 290 mL    Solution: 300 mL  Total IN: 2616.6 mL    OUT:    Indwelling Catheter - Urethral: 1210 mL  Total OUT: 1210 mL    Total NET: 1406.6 mL      08-29    141  |  109<H>  |  20  ----------------------------<  176<H>  4.3   |  18<L>  |  1.05    Ca    8.4      29 Aug 2018 03:01  Phos  3.8     08-29  Mg     2.3     08-29    TPro  5.1<L>  /  Alb  1.9<L>  /  TBili  0.4  /  DBili  0.1  /  AST  15  /  ALT  9<L>  /  AlkPhos  149<H>  08-29    [x ] Blackman catheter, indication: critically ill  Meds: dextrose 5% + lactated ringers. 1000 milliLiter(s) IV Continuous <Continuous>        HEMATOLOGIC  Meds: enoxaparin Injectable 40 milliGRAM(s) SubCutaneous daily    [x] VTE Prophylaxis                        9.1    12.0  )-----------( 279      ( 29 Aug 2018 03:01 )             28.5     PT/INR - ( 29 Aug 2018 03:01 )   PT: 13.4 sec;   INR: 1.22 ratio         PTT - ( 29 Aug 2018 03:01 )  PTT:38.6 sec  Transfusion     [ ] PRBC   [ ] Platelets   [ ] FFP   [ ] Cryoprecipitate      INFECTIOUS DISEASES  T(C): 36.5 (08-29-18 @ 03:00), Max: 38.4 (08-28-18 @ 15:00)  WBC Count: 12.0 K/uL (08-29 @ 03:01)  WBC Count: 9.8 K/uL (08-28 @ 19:41)  WBC Count: 9.2 K/uL (08-28 @ 10:06)  WBC Count: 10.5 K/uL (08-28 @ 04:49)    Recent Cultures:  Specimen Source: .Blood Blood, 08-28 @ 02:49; Results   No growth to date.; Gram Stain: --; Organism: --  Specimen Source: .Blood Blood, 08-26 @ 23:03; Results   No growth to date.; Gram Stain: --; Organism: --    Meds: cefepime   IVPB 1000 milliGRAM(s) IV Intermittent every 24 hours  cefepime   IVPB      metroNIDAZOLE  IVPB      metroNIDAZOLE  IVPB 500 milliGRAM(s) IV Intermittent every 8 hours  vancomycin  IVPB 500 milliGRAM(s) IV Intermittent every 24 hours        ENDOCRINE  Capillary Blood Glucose  POCT Blood Glucose.: 160 mg/dL (28 Aug 2018 23:26)  POCT Blood Glucose.: 152 mg/dL (28 Aug 2018 17:42)  POCT Blood Glucose.: 149 mg/dL (28 Aug 2018 12:58)  POCT Blood Glucose.: 167 mg/dL (28 Aug 2018 05:14)    Meds: insulin lispro (HumaLOG) corrective regimen sliding scale   SubCutaneous every 6 hours  predniSONE   Tablet 10 milliGRAM(s) Oral every 24 hours        ACCESS DEVICES:  [ x] Peripheral IV  [ ] Central Venous Line	[ ] R	[ ] L	[ ] IJ	[ ] Fem	[ ] SC	Placed:   [x ] Arterial Line		[x ] R	[ ] L	[x ] Fem	[ ] Rad	[ ] Ax	Placed: 8/28  [x ] PICC:	L basilic 8/24				[ ] Mediport  [x ] Urinary Catheter, Date Placed: 8/28  [x ] Necessity of urinary, arterial, and venous catheters discussed    OTHER MEDICATIONS:  chlorhexidine 4% Liquid 1 Application(s) Topical <User Schedule>  lidocaine   Patch 1 Patch Transdermal every 24 hours  nystatin Powder 1 Application(s) Topical two times a day PRN      CODE STATUS: full code    IMAGING: x 24 HOUR EVENTS: Pt weaned off phenylephrine during the day, had to go back on overnight but was weaned off again. She was restarted on Pivot @ 30 ml/hr via PEG. Prednisone 10mg restarted overnight (home dose). Pt was lethargic throughout the day but overnight she woke up and was restless. This morning she vomited and she was made NPO, PEG placed to gravity. She appeared more tachypneic and labored afterwards and was placed on in high flow for added PEEP.    HISTORY  MYRIAM HEATH is a 68y Female with history of asthma, CVA, pulmonary HTN s/p hip fx sec to osteoporosis due to ?steroid use s/p repair with subsequent surgical site infection and removal of hardware (7/27) with placement of antibiotic spacer device and right femur ORIF (8/4). The pt had persistent bacteremia with MRSA and was treated with Daptomycin and Ceftaroline followed by Vancomycin and meropenem. A PICC line was placed 8/22 for long term IV access recently which was changed 8/24 secondary to tip not in optimal position. She was also pegged on 8/24. She developed fevers on the floor and a CT A/P was performed 8/27 which was neg for acute path and PEG was found to be in place. She returned to SICU 8/27 after she was found to be hypotensive refractory to fluid challenge needs vasopressor support to maintain her hemodynamics. Panculture has been dose and started on broad spectrum antibiotics.      SUBJECTIVE/ROS:  [ x] A ten-point review of systems was otherwise negative except as noted.  [ ] Due to altered mental status/intubation, subjective information were not able to be obtained from the patient. History was obtained, to the extent possible, from review of the chart and collateral sources of information.      NEURO  RASS: +1      Exam: awake, alert, oriented to self and location  Meds: acetaminophen   Tablet. 975 milliGRAM(s) Oral every 8 hours PRN Mild Pain (1 - 3)    [x] Adequacy of sedation and pain control has been assessed and adjusted      RESPIRATORY  RR: 32 (08-29-18 @ 03:45) (19 - 44)  SpO2: 95% (08-29-18 @ 03:45) (73% - 100%)  Exam: unlabored, clear to auscultation bilaterally  ABG - ( 29 Aug 2018 02:32 )  pH: 7.48  /  pCO2: 28    /  pO2: 83    / HCO3: 21    / Base Excess: -1.4  /  SaO2: 98      Blood Gas Arterial, Lactate: 1.5 mmol/L (08.29.18 @ 02:32)    [n/a ] Extubation Readiness Assessed  Meds: ALBUTerol/ipratropium for Nebulization 3 milliLiter(s) Nebulizer every 6 hours  buDESOnide   0.5 milliGRAM(s) Respule 0.5 milliGRAM(s) Inhalation every 12 hours        CARDIOVASCULAR  HR: 110 (08-29-18 @ 03:45) (89 - 114)  BP: 120/66 (08-28-18 @ 19:30) (120/66 - 120/66)  BP(mean): 86 (08-28-18 @ 19:30) (86 - 86)  ABP: 161/67 (08-29-18 @ 03:45) (92/37 - 173/74)  ABP(mean): 106 (08-29-18 @ 03:45) (58 - 115)    Exam: regular rhythm, tachycardic   Cardiac Rhythm: sinus tachycardia  Perfusion     [x ]Adequate   [ ]Inadequate  Mentation   [ x]Normal       [ ]Reduced  Extremities  [ x]Warm         [ ]Cool  Volume Status [ ]Hypervolemic [x ]Euvolemic [ ]Hypovolemic  Meds: x      GI/NUTRITION  Exam: soft, nontender, nondistended, PEG in place  Diet: NPO with Pivot @ 30ml/hr  Meds: pantoprazole   Suspension 40 milliGRAM(s) Oral daily      GENITOURINARY  I&O's Detail    08-27 @ 07:01  -  08-28 @ 07:00  --------------------------------------------------------  IN:    0.9% NaCl: 100 mL    dextrose 5% + lactated ringers.: 700 mL    dextrose 5% + sodium chloride 0.45% with potassium chloride 20 mEq/L: 1130 mL    Enteral Tube Flush: 90 mL    IV PiggyBack: 200 mL    Lactated Ringers IV Bolus: 500 mL    Packed Red Blood Cells: 350 mL    phenylephrine   Infusion: 39.7 mL    Pivot: 40 mL    Solution: 300 mL    Solution: 200 mL  Total IN: 3649.7 mL    OUT:    Indwelling Catheter - Urethral: 510 mL    Voided: 200 mL  Total OUT: 710 mL    Total NET: 2939.7 mL      08-28 @ 07:01  -  08-29 @ 04:11  --------------------------------------------------------  IN:    dextrose 5% + lactated ringers.: 2000 mL    phenylephrine   Infusion: 26.6 mL    Pivot: 290 mL    Solution: 300 mL  Total IN: 2616.6 mL    OUT:    Indwelling Catheter - Urethral: 1210 mL  Total OUT: 1210 mL    Total NET: 1406.6 mL      08-29    141  |  109<H>  |  20  ----------------------------<  176<H>  4.3   |  18<L>  |  1.05    Ca    8.4      29 Aug 2018 03:01  Phos  3.8     08-29  Mg     2.3     08-29    TPro  5.1<L>  /  Alb  1.9<L>  /  TBili  0.4  /  DBili  0.1  /  AST  15  /  ALT  9<L>  /  AlkPhos  149<H>  08-29    [x ] Blackman catheter, indication: critically ill  Meds: dextrose 5% + lactated ringers. 1000 milliLiter(s) IV Continuous <Continuous>        HEMATOLOGIC  Meds: enoxaparin Injectable 40 milliGRAM(s) SubCutaneous daily    [x] VTE Prophylaxis                        9.1    12.0  )-----------( 279      ( 29 Aug 2018 03:01 )             28.5     PT/INR - ( 29 Aug 2018 03:01 )   PT: 13.4 sec;   INR: 1.22 ratio         PTT - ( 29 Aug 2018 03:01 )  PTT:38.6 sec  Transfusion     [ ] PRBC   [ ] Platelets   [ ] FFP   [ ] Cryoprecipitate      INFECTIOUS DISEASES  T(C): 36.5 (08-29-18 @ 03:00), Max: 38.4 (08-28-18 @ 15:00)  WBC Count: 12.0 K/uL (08-29 @ 03:01)  WBC Count: 9.8 K/uL (08-28 @ 19:41)  WBC Count: 9.2 K/uL (08-28 @ 10:06)  WBC Count: 10.5 K/uL (08-28 @ 04:49)    Recent Cultures:  Specimen Source: .Blood Blood, 08-28 @ 02:49; Results   No growth to date.; Gram Stain: --; Organism: --  Specimen Source: .Blood Blood, 08-26 @ 23:03; Results   No growth to date.; Gram Stain: --; Organism: --    Meds: cefepime   IVPB 1000 milliGRAM(s) IV Intermittent every 24 hours  cefepime   IVPB      metroNIDAZOLE  IVPB      metroNIDAZOLE  IVPB 500 milliGRAM(s) IV Intermittent every 8 hours  vancomycin  IVPB 500 milliGRAM(s) IV Intermittent every 24 hours        ENDOCRINE  Capillary Blood Glucose  POCT Blood Glucose.: 160 mg/dL (28 Aug 2018 23:26)  POCT Blood Glucose.: 152 mg/dL (28 Aug 2018 17:42)  POCT Blood Glucose.: 149 mg/dL (28 Aug 2018 12:58)  POCT Blood Glucose.: 167 mg/dL (28 Aug 2018 05:14)    Meds: insulin lispro (HumaLOG) corrective regimen sliding scale   SubCutaneous every 6 hours  predniSONE   Tablet 10 milliGRAM(s) Oral every 24 hours        ACCESS DEVICES:  [ x] Peripheral IV  [ ] Central Venous Line	[ ] R	[ ] L	[ ] IJ	[ ] Fem	[ ] SC	Placed:   [x ] Arterial Line		[x ] R	[ ] L	[x ] Fem	[ ] Rad	[ ] Ax	Placed: 8/28  [x ] PICC:	L basilic 8/24				[ ] Mediport  [x ] Urinary Catheter, Date Placed: 8/28  [x ] Necessity of urinary, arterial, and venous catheters discussed    OTHER MEDICATIONS:  chlorhexidine 4% Liquid 1 Application(s) Topical <User Schedule>  lidocaine   Patch 1 Patch Transdermal every 24 hours  nystatin Powder 1 Application(s) Topical two times a day PRN      CODE STATUS: full code    IMAGING: x

## 2018-08-30 LAB
ALBUMIN SERPL ELPH-MCNC: 1.8 G/DL — LOW (ref 3.3–5)
ALP SERPL-CCNC: 141 U/L — HIGH (ref 40–120)
ALT FLD-CCNC: 9 U/L — LOW (ref 10–45)
ANION GAP SERPL CALC-SCNC: 14 MMOL/L — SIGNIFICANT CHANGE UP (ref 5–17)
APTT BLD: 37.4 SEC — SIGNIFICANT CHANGE UP (ref 27.5–37.4)
AST SERPL-CCNC: 17 U/L — SIGNIFICANT CHANGE UP (ref 10–40)
BILIRUB DIRECT SERPL-MCNC: 0.2 MG/DL — SIGNIFICANT CHANGE UP (ref 0–0.2)
BILIRUB INDIRECT FLD-MCNC: 0.2 MG/DL — SIGNIFICANT CHANGE UP (ref 0.2–1)
BILIRUB SERPL-MCNC: 0.4 MG/DL — SIGNIFICANT CHANGE UP (ref 0.2–1.2)
BUN SERPL-MCNC: 17 MG/DL — SIGNIFICANT CHANGE UP (ref 7–23)
CALCIUM SERPL-MCNC: 8.6 MG/DL — SIGNIFICANT CHANGE UP (ref 8.4–10.5)
CHLORIDE SERPL-SCNC: 105 MMOL/L — SIGNIFICANT CHANGE UP (ref 96–108)
CO2 SERPL-SCNC: 19 MMOL/L — LOW (ref 22–31)
CREAT SERPL-MCNC: 0.99 MG/DL — SIGNIFICANT CHANGE UP (ref 0.5–1.3)
GAS PNL BLDA: SIGNIFICANT CHANGE UP
GLUCOSE SERPL-MCNC: 174 MG/DL — HIGH (ref 70–99)
HCT VFR BLD CALC: 28.1 % — LOW (ref 34.5–45)
HGB BLD-MCNC: 8.9 G/DL — LOW (ref 11.5–15.5)
INR BLD: 1.28 RATIO — HIGH (ref 0.88–1.16)
MAGNESIUM SERPL-MCNC: 1.7 MG/DL — SIGNIFICANT CHANGE UP (ref 1.6–2.6)
MCHC RBC-ENTMCNC: 28.6 PG — SIGNIFICANT CHANGE UP (ref 27–34)
MCHC RBC-ENTMCNC: 31.6 GM/DL — LOW (ref 32–36)
MCV RBC AUTO: 90.3 FL — SIGNIFICANT CHANGE UP (ref 80–100)
PHOSPHATE SERPL-MCNC: 4.1 MG/DL — SIGNIFICANT CHANGE UP (ref 2.5–4.5)
PLATELET # BLD AUTO: 258 K/UL — SIGNIFICANT CHANGE UP (ref 150–400)
POTASSIUM SERPL-MCNC: 3.7 MMOL/L — SIGNIFICANT CHANGE UP (ref 3.5–5.3)
POTASSIUM SERPL-SCNC: 3.7 MMOL/L — SIGNIFICANT CHANGE UP (ref 3.5–5.3)
PROT SERPL-MCNC: 4.9 G/DL — LOW (ref 6–8.3)
PROTHROM AB SERPL-ACNC: 13.9 SEC — HIGH (ref 9.8–12.7)
RBC # BLD: 3.11 M/UL — LOW (ref 3.8–5.2)
RBC # FLD: 13.9 % — SIGNIFICANT CHANGE UP (ref 10.3–14.5)
SODIUM SERPL-SCNC: 138 MMOL/L — SIGNIFICANT CHANGE UP (ref 135–145)
VANCOMYCIN TROUGH SERPL-MCNC: 13.3 UG/ML — SIGNIFICANT CHANGE UP (ref 10–20)
WBC # BLD: 11.3 K/UL — HIGH (ref 3.8–10.5)
WBC # FLD AUTO: 11.3 K/UL — HIGH (ref 3.8–10.5)

## 2018-08-30 PROCEDURE — 71045 X-RAY EXAM CHEST 1 VIEW: CPT | Mod: 26

## 2018-08-30 PROCEDURE — 99233 SBSQ HOSP IP/OBS HIGH 50: CPT | Mod: GC

## 2018-08-30 RX ORDER — ACETAMINOPHEN 500 MG
650 TABLET ORAL ONCE
Qty: 0 | Refills: 0 | Status: COMPLETED | OUTPATIENT
Start: 2018-08-30 | End: 2018-08-30

## 2018-08-30 RX ORDER — MAGNESIUM SULFATE 500 MG/ML
2 VIAL (ML) INJECTION ONCE
Qty: 0 | Refills: 0 | Status: COMPLETED | OUTPATIENT
Start: 2018-08-30 | End: 2018-08-30

## 2018-08-30 RX ORDER — POTASSIUM CHLORIDE 20 MEQ
20 PACKET (EA) ORAL
Qty: 0 | Refills: 0 | Status: COMPLETED | OUTPATIENT
Start: 2018-08-30 | End: 2018-08-30

## 2018-08-30 RX ORDER — SODIUM CHLORIDE 9 MG/ML
1000 INJECTION, SOLUTION INTRAVENOUS
Qty: 0 | Refills: 0 | Status: DISCONTINUED | OUTPATIENT
Start: 2018-08-30 | End: 2018-09-01

## 2018-08-30 RX ORDER — ONDANSETRON 8 MG/1
4 TABLET, FILM COATED ORAL ONCE
Qty: 0 | Refills: 0 | Status: COMPLETED | OUTPATIENT
Start: 2018-08-30 | End: 2018-08-30

## 2018-08-30 RX ADMIN — Medication 3 MILLILITER(S): at 23:57

## 2018-08-30 RX ADMIN — Medication 100 MILLIGRAM(S): at 05:53

## 2018-08-30 RX ADMIN — Medication 50 GRAM(S): at 12:06

## 2018-08-30 RX ADMIN — Medication 50 MILLIEQUIVALENT(S): at 10:26

## 2018-08-30 RX ADMIN — Medication 100 MILLIGRAM(S): at 21:37

## 2018-08-30 RX ADMIN — CHLORHEXIDINE GLUCONATE 1 APPLICATION(S): 213 SOLUTION TOPICAL at 05:54

## 2018-08-30 RX ADMIN — CEFEPIME 100 MILLIGRAM(S): 1 INJECTION, POWDER, FOR SOLUTION INTRAMUSCULAR; INTRAVENOUS at 00:07

## 2018-08-30 RX ADMIN — Medication 2: at 13:05

## 2018-08-30 RX ADMIN — Medication 3 MILLILITER(S): at 17:41

## 2018-08-30 RX ADMIN — Medication 0.5 MILLIGRAM(S): at 11:16

## 2018-08-30 RX ADMIN — Medication 100 MILLIGRAM(S): at 13:21

## 2018-08-30 RX ADMIN — Medication 8 MILLIGRAM(S): at 01:00

## 2018-08-30 RX ADMIN — ENOXAPARIN SODIUM 40 MILLIGRAM(S): 100 INJECTION SUBCUTANEOUS at 12:08

## 2018-08-30 RX ADMIN — ONDANSETRON 4 MILLIGRAM(S): 8 TABLET, FILM COATED ORAL at 14:47

## 2018-08-30 RX ADMIN — SERTRALINE 50 MILLIGRAM(S): 25 TABLET, FILM COATED ORAL at 12:25

## 2018-08-30 RX ADMIN — MIRTAZAPINE 45 MILLIGRAM(S): 45 TABLET, ORALLY DISINTEGRATING ORAL at 22:08

## 2018-08-30 RX ADMIN — Medication 500 MILLIGRAM(S): at 01:01

## 2018-08-30 RX ADMIN — Medication 500 MILLIGRAM(S): at 12:06

## 2018-08-30 RX ADMIN — Medication 3 MILLILITER(S): at 05:51

## 2018-08-30 RX ADMIN — PANTOPRAZOLE SODIUM 40 MILLIGRAM(S): 20 TABLET, DELAYED RELEASE ORAL at 12:08

## 2018-08-30 RX ADMIN — Medication 100 MILLIGRAM(S): at 06:33

## 2018-08-30 RX ADMIN — Medication 50 MILLIEQUIVALENT(S): at 12:06

## 2018-08-30 RX ADMIN — Medication 2 MILLIGRAM(S): at 21:30

## 2018-08-30 RX ADMIN — Medication 0.5 MILLIGRAM(S): at 23:56

## 2018-08-30 RX ADMIN — LIDOCAINE 1 PATCH: 4 CREAM TOPICAL at 22:13

## 2018-08-30 RX ADMIN — SODIUM CHLORIDE 30 MILLILITER(S): 9 INJECTION, SOLUTION INTRAVENOUS at 21:30

## 2018-08-30 RX ADMIN — Medication 650 MILLIGRAM(S): at 23:41

## 2018-08-30 RX ADMIN — QUETIAPINE FUMARATE 50 MILLIGRAM(S): 200 TABLET, FILM COATED ORAL at 22:08

## 2018-08-30 RX ADMIN — Medication 2: at 05:53

## 2018-08-30 RX ADMIN — Medication 500 MILLIGRAM(S): at 18:06

## 2018-08-30 RX ADMIN — Medication 650 MILLIGRAM(S): at 23:11

## 2018-08-30 RX ADMIN — LIDOCAINE 1 PATCH: 4 CREAM TOPICAL at 10:00

## 2018-08-30 RX ADMIN — Medication 3 MILLILITER(S): at 11:16

## 2018-08-30 NOTE — PROGRESS NOTE ADULT - ASSESSMENT
MYRIAM HEATH is a 68y Female with history of asthma, CVA, pulmonary HTN presents with septic shock from MRSA bacteremia secondary to infected right hemiarthroplasty hardware s/p right hip I&D, explantation of right hemiarthroplasty, placement of antibiotic spacer, and right femur ORIF. Readmitted to SICU 8/27 with septic shock requiring pressors.      PLAN:  NEURO: h/o anxiety, depression, dementia; now lethargic but waking up  - Monitor mental status.   - Giving home regimen dose of remeron, seroquil, and klonopin at bedtime   - Pain control with Tylenol and Lidoderm patch.    RESPIRATORY: h/o asthma  - Monitor pulse oximeter and ABGs.  - Out of bed to chair, incentive spirometry, and pulmonary toileting to prevent atelectasis.  - Home prednisone 10 mg daily, Pulmicort, and Duoneb for asthma.    CARDIOVASCULAR: Pulmonary HTN   - Monitor vital signs.  - Reassess volume status and administer fluid as necessary    GI/NUTRITION: dysphagia  - NPO with G-tube to gravity   - Protonix for GERD.    GENITOURINARY/RENAL: CKD stage 2  - Monitor I&Os. Blackman.  - Monitor electrolytes and replete as necessary.  - KVO fluids.    HEMATOLOGIC: no acute issues  - Lovenox for VTE prophylaxis.    INFECTIOUS DISEASE: MRSA bacteremia from infected right hip, Candiduria, & PNA, resolved, sepsis   - Cefepime, flagyl, and vancomycin for empiric antibiotics  - Follow up cultures    ENDOCRINE: hyperglycemia  - ISS q 6      - Scar Worley PA-C

## 2018-08-30 NOTE — PROGRESS NOTE ADULT - ATTENDING COMMENTS
Dr. Arias (Attending Physician)  N - Seroquel and Clonazepam to start today. Restart anxiety medications and antipsychotic medications since patient is more awake and anxious this morning.  P - Weaned off HFNC for acute resp failure today, ho asthma on budesonide, duonebs, solumedrol  C - Restarted steroids. Off vasopressors for septic shock.  GI - High residuals with bilious vomiting on floor concerning for aspiration event G-tube with 700 mL output yesterday had BM, on erythromycin   - Will change D5LR to 30 mL/hr  H - Anemia - Stable decrease frequency of lab checks.  ID - c/w vanc/cefepime/metronidazole for septic shock from suspected pna/aspiration. Vanc level 13.  Will send procalcitonin   Endo - on solumedrol for asthma

## 2018-08-30 NOTE — PROGRESS NOTE ADULT - SUBJECTIVE AND OBJECTIVE BOX
Patient seen and examined. Pain controlled.    Physical exam  VS: see EMR  Gen: NAD  Right LE: Incision granulation tissue and some discoloration mid-incision - no erythema/expressible drainage. +EHL/FHL/TA/GSC. SILT L3-S1. +Capillary refill brisk. Compartments soft and compressible.      68F s/p R hip PJI explant and placement of cement spacer with distal femur ORIF    Daily wet to dry dressing changes - clean with betadiene  Pain control  appreciate SICU care  appreciate recommendations regarding nutritional optimization  NWB right LE  Flat foot weight bearing left LE

## 2018-08-30 NOTE — PROGRESS NOTE ADULT - SUBJECTIVE AND OBJECTIVE BOX
CC: f/u for MRSA bacteremia, infected R hip, resp failure    Patient remains somnolent, slow to arouse, no distress on Venti Mask    REVIEW OF SYSTEMS:  All other review of systems negative (Comprehensive ROS)- limited    Antimicrobials:  cefepime   IVPB   Day 3  metroNIDAZOLE  IVPB      metroNIDAZOLE  IVPB 500 milliGRAM(s) IV Intermittent every 8 hours  Day 3  vancomycin  IVPB 500 milliGRAM(s) IV Intermittent every 24 hours   Day     Other Medications Reviewed    Vital Signs Last 24 Hrs  T(F): 98.7 (30 Aug 2018 15:00), Max: 98.7 (30 Aug 2018 15:00)  HR: 101 (30 Aug 2018 17:42) (90 - 110)  RR: 22 (30 Aug 2018 17:00) (1 - 32)  SpO2: 100% (30 Aug 2018 17:42) (92% - 100%)    PHYSICAL EXAM:  General: lethargic, no acute distress  Eyes:  anicteric, no conjunctival injection, no discharge  Oropharynx: no lesions or injection 	  Neck: supple, without adenopathy  Lungs: decreased BSs bases  Heart: regular rate and rhythm; no murmur, rubs or gallops  Abdomen: soft, nondistended, nontender, G tube site clean  Skin: no lesions  Extremities: no edema.  R hip wound clean and intact.  L PICC site clean  Neurologic: lethargic, moves all extremities    LAB RESULTS:                        8.9    11.3  )-----------( 258      ( 30 Aug 2018 03:57 )             28.1   08-30    138  |  105  |  17  ----------------------------<  174<H>  3.7   |  19<L>  |  0.99    Ca    8.6      30 Aug 2018 03:57  Phos  4.1     08-30  Mg     1.7     08-30    TPro  4.9<L>  /  Alb  1.8<L>  /  TBili  0.4  /  DBili  0.2  /  AST  17  /  ALT  9<L>  /  AlkPhos  141<H>  08-30        Urinalysis Basic - ( 28 Aug 2018 03:39 )    Color: Yellow / Appearance: Clear / S.014 / pH: x  Gluc: x / Ketone: Negative  / Bili: Negative / Urobili: Negative   Blood: x / Protein: 30 mg/dL / Nitrite: Negative   Leuk Esterase: Negative / RBC: 10-25 /HPF / WBC 3-5 /HPF   Sq Epi: x / Non Sq Epi: x / Bacteria: Few /HPF      MICROBIOLOGY:  RECENT CULTURES:  Culture - Blood (18 @ 05:35)    Specimen Source: .Blood Blood-Peripheral    Culture Results:   No growth to date.     @ 23:03 .Blood Blood     No growth to date.      RADIOLOGY REVIEWED:  Xray Chest 1 View- PORTABLE-Routine (18 @ 06:57) >  Left small pleural effusion and/or atelectasis. The right lung is grossly clear given the limitations of the   exam.    CT Abdomen and Pelvis No Cont (18 @ 09:35) >  No acute intra abdominal pathology or collection.  Left acetabular fracture as at recent prior imaging.

## 2018-08-30 NOTE — PROGRESS NOTE ADULT - SUBJECTIVE AND OBJECTIVE BOX
HISTORY   68y Female with history of asthma, CVA, pulmonary HTN s/p hip fx sec to osteoporosis due to ?steroid use s/p repair with subsequent surgical site infection and removal of hardware (7/27) with placement of antibiotic spacer device and right femur ORIF (8/4). The pt had persistent bacteremia with MRSA and was treated with Daptomycin and Ceftaroline followed by Vancomycin and meropenem. A PICC line was placed 8/22 for long term IV access recently which was changed 8/24 secondary to tip not in optimal position. She was also pegged on 8/24. She developed fevers on the floor and a CT A/P was performed 8/27 which was neg for acute path and PEG was found to be in place. She returned to SICU 8/27 after she was found to be hypotensive refractory to fluid challenge needs vasopressor support to maintain her hemodynamics. Panculture has been dose and started on broad spectrum antibiotics.      24 HOUR EVENTS: Hi -rey nasal cannula started in the am after episode of emesis. Patient did not become hypoxic during episode but was a prophylactic measure for concern for aspiration. No acute overnight events.     SUBJECTIVE/ROS:  [ ] A ten-point review of systems was otherwise negative except as noted.  [ ] Due to altered mental status/intubation, subjective information were not able to be obtained from the patient. History was obtained, to the extent possible, from review of the chart and collateral sources of information.      NEURO  Exam: awake, disoriented, intermittently agitated   Meds: clonazePAM Tablet 2 milliGRAM(s) Oral <User Schedule>  mirtazapine 45 milliGRAM(s) Oral <User Schedule>  QUEtiapine 50 milliGRAM(s) Oral <User Schedule>  sertraline 50 milliGRAM(s) Oral daily    [x] Adequacy of sedation and pain control has been assessed and adjusted      RESPIRATORY  RR: 7 (08-30-18 @ 01:00) (7 - 44)  SpO2: 98% (08-30-18 @ 01:00) (83% - 100%)  Exam: unlabored, clear to auscultation bilaterally  Mechanical Ventilation: none  ABG - ( 29 Aug 2018 02:32 )  pH: 7.48  /  pCO2: 28    /  pO2: 83    / HCO3: 21    / Base Excess: -1.4  /  SaO2: 98      Lactate: x      [N/A] Extubation Readiness Assessed  Meds: ALBUTerol/ipratropium for Nebulization 3 milliLiter(s) Nebulizer every 6 hours  buDESOnide   0.5 milliGRAM(s) Respule 0.5 milliGRAM(s) Inhalation every 12 hours        CARDIOVASCULAR  HR: 105 (08-30-18 @ 01:00) (90 - 118)  ABP: 141/60 (08-30-18 @ 01:00) (104/53 - 173/74)  ABP(mean): 94 (08-30-18 @ 01:00) (71 - 115)  Exam: regular rate and rhythm  Cardiac Rhythm: sinus  Perfusion     [x]Adequate   [ ]Inadequate  Mentation   [x]Normal       [ ]Reduced  Extremities  [x]Warm         [ ]Cool  Volume Status [ ]Hypervolemic [x]Euvolemic [ ]Hypovolemic  Meds: none      GI/NUTRITION  Exam: soft, nontender, nondistended, incision C/D/I  Diet: NPO, G-tube to gravity   Meds: pantoprazole  Injectable 40 milliGRAM(s) IV Push every 24 hours      GENITOURINARY  I&O's Detail    08-28 @ 07:01  -  08-29 @ 07:00  --------------------------------------------------------  IN:    dextrose 5% + lactated ringers.: 2400 mL    phenylephrine   Infusion: 26.6 mL    Pivot: 320 mL    Solution: 200 mL    Solution: 300 mL  Total IN: 3246.6 mL    OUT:    Indwelling Catheter - Urethral: 1505 mL    PEG (Percutaneous Endoscopic Gastrostomy) Tube: 250 mL  Total OUT: 1755 mL    Total NET: 1491.6 mL      08-29 @ 07:01  -  08-30 @ 01:53  --------------------------------------------------------  IN:    dextrose 5% + lactated ringers.: 1800 mL    Enteral Tube Flush: 40 mL    IV PiggyBack: 100 mL    Solution: 200 mL  Total IN: 2140 mL    OUT:    Indwelling Catheter - Urethral: 350 mL    PEG (Percutaneous Endoscopic Gastrostomy) Tube: 350 mL    Voided: 400 mL  Total OUT: 1100 mL    Total NET: 1040 mL          08-29    141  |  109<H>  |  20  ----------------------------<  176<H>  4.3   |  18<L>  |  1.05    Ca    8.4      29 Aug 2018 03:01  Phos  3.8     08-29  Mg     2.3     08-29    TPro  5.1<L>  /  Alb  1.9<L>  /  TBili  0.4  /  DBili  0.1  /  AST  15  /  ALT  9<L>  /  AlkPhos  149<H>  08-29    [ ] Blackman catheter, indication: N/A  Meds: dextrose 5% + lactated ringers. 1000 milliLiter(s) IV Continuous <Continuous>        HEMATOLOGIC  Meds: enoxaparin Injectable 40 milliGRAM(s) SubCutaneous daily    [x] VTE Prophylaxis                        9.1    12.0  )-----------( 279      ( 29 Aug 2018 03:01 )             28.5     PT/INR - ( 29 Aug 2018 03:01 )   PT: 13.4 sec;   INR: 1.22 ratio         PTT - ( 29 Aug 2018 03:01 )  PTT:38.6 sec  Transfusion     [ ] PRBC   [ ] Platelets   [ ] FFP   [ ] Cryoprecipitate      INFECTIOUS DISEASES  WBC Count: 12.0 K/uL (08-29 @ 03:01)    RECENT CULTURES:  Specimen Source: .Blood Blood-Peripheral  Date/Time: 08-28 @ 05:35  Culture Results:   No growth to date.  Gram Stain: --  Organism: --  Specimen Source: .Blood Blood  Date/Time: 08-28 @ 02:49  Culture Results:   No growth to date.  Gram Stain: --  Organism: --  Specimen Source: .Blood Blood  Date/Time: 08-26 @ 23:03  Culture Results:   No growth to date.  Gram Stain: --  Organism: --    Meds: cefepime   IVPB 1000 milliGRAM(s) IV Intermittent every 24 hours  cefepime   IVPB      erythromycin    ethylsuccinate Suspension 40 mG/mL 500 milliGRAM(s) Oral every 8 hours  metroNIDAZOLE  IVPB      metroNIDAZOLE  IVPB 500 milliGRAM(s) IV Intermittent every 8 hours  vancomycin  IVPB 500 milliGRAM(s) IV Intermittent every 24 hours        ENDOCRINE  CAPILLARY BLOOD GLUCOSE      POCT Blood Glucose.: 139 mg/dL (30 Aug 2018 00:20)  POCT Blood Glucose.: 97 mg/dL (29 Aug 2018 17:09)  POCT Blood Glucose.: 207 mg/dL (29 Aug 2018 12:13)  POCT Blood Glucose.: 162 mg/dL (29 Aug 2018 05:46)    Meds: insulin lispro (HumaLOG) corrective regimen sliding scale   SubCutaneous every 6 hours  methylPREDNISolone sodium succinate Injectable 8 milliGRAM(s) IV Push every 24 hours        ACCESS DEVICES:  [x] Peripheral IV  [] Central Venous Line	[ ] R	[ ] [ ] IJ	[ ] Fem	[ ] SC	Placed:   [ x] Arterial Line		[ ] R	[x ] L	[x ] Fem	[ ] Rad	[ ] Ax	Placed:   (x ] PICC:					[ ] Mediport  [ ] Urinary Catheter, Date Placed:   [x] Necessity of urinary, arterial, and venous catheters discussed    OTHER MEDICATIONS:  chlorhexidine 4% Liquid 1 Application(s) Topical <User Schedule>  lidocaine   Patch 1 Patch Transdermal every 24 hours  nystatin Powder 1 Application(s) Topical two times a day PRN      CODE STATUS: full code      IMAGING: < from: CT Abdomen and Pelvis No Cont (08.27.18 @ 09:35) >  IMPRESSION:     No acute intra abdominal pathology or collection.    Left acetabular fracture as at recent prior imaging.                  < end of copied text > HISTORY   68y Female with history of asthma, CVA, pulmonary HTN s/p hip fx sec to osteoporosis due to ?steroid use s/p repair with subsequent surgical site infection and removal of hardware (7/27) with placement of antibiotic spacer device and right femur ORIF (8/4). The pt had persistent bacteremia with MRSA and was treated with Daptomycin and Ceftaroline followed by Vancomycin and meropenem. A PICC line was placed 8/22 for long term IV access recently which was changed 8/24 secondary to tip not in optimal position. She was also pegged on 8/24. She developed fevers on the floor and a CT A/P was performed 8/27 which was neg for acute path and PEG was found to be in place. She returned to SICU 8/27 after she was found to be hypotensive refractory to fluid challenge needs vasopressor support to maintain her hemodynamics. Panculture has been dose and started on broad spectrum antibiotics.      24 HOUR EVENTS: Hi -rey nasal cannula started in the am after episode of emesis. Patient did not become hypoxic during episode but was a prophylactic measure for concern for aspiration. No acute overnight events.     SUBJECTIVE/ROS:  [ ] A ten-point review of systems was otherwise negative except as noted.  [ ] Due to altered mental status/intubation, subjective information were not able to be obtained from the patient. History was obtained, to the extent possible, from review of the chart and collateral sources of information.      NEURO  Exam: awake, disoriented, intermittently agitated   Meds: clonazePAM Tablet 2 milliGRAM(s) Oral <User Schedule>  mirtazapine 45 milliGRAM(s) Oral <User Schedule>  QUEtiapine 50 milliGRAM(s) Oral <User Schedule>  sertraline 50 milliGRAM(s) Oral daily    [x] Adequacy of sedation and pain control has been assessed and adjusted      RESPIRATORY  RR: 7 (08-30-18 @ 01:00) (7 - 44)  SpO2: 98% (08-30-18 @ 01:00) (83% - 100%)  Exam: unlabored, clear to auscultation bilaterally  Mechanical Ventilation: none  ABG - ( 29 Aug 2018 02:32 )  pH: 7.48  /  pCO2: 28    /  pO2: 83    / HCO3: 21    / Base Excess: -1.4  /  SaO2: 98      Lactate: x      [N/A] Extubation Readiness Assessed  Meds: ALBUTerol/ipratropium for Nebulization 3 milliLiter(s) Nebulizer every 6 hours  buDESOnide   0.5 milliGRAM(s) Respule 0.5 milliGRAM(s) Inhalation every 12 hours        CARDIOVASCULAR  HR: 105 (08-30-18 @ 01:00) (90 - 118)  ABP: 141/60 (08-30-18 @ 01:00) (104/53 - 173/74)  ABP(mean): 94 (08-30-18 @ 01:00) (71 - 115)  Exam: regular rate and rhythm  Cardiac Rhythm: sinus  Perfusion     [x]Adequate   [ ]Inadequate  Mentation   [x]Normal       [ ]Reduced  Extremities  [x]Warm         [ ]Cool  Volume Status [ ]Hypervolemic [x]Euvolemic [ ]Hypovolemic  Meds: none      GI/NUTRITION  Exam: soft, nontender, nondistended, incision C/D/I  Diet: NPO, G-tube to gravity   Meds: pantoprazole  Injectable 40 milliGRAM(s) IV Push every 24 hours      GENITOURINARY  I&O's Detail    08-28 @ 07:01  -  08-29 @ 07:00  --------------------------------------------------------  IN:    dextrose 5% + lactated ringers.: 2400 mL    phenylephrine   Infusion: 26.6 mL    Pivot: 320 mL    Solution: 200 mL    Solution: 300 mL  Total IN: 3246.6 mL    OUT:    Indwelling Catheter - Urethral: 1505 mL    PEG (Percutaneous Endoscopic Gastrostomy) Tube: 250 mL  Total OUT: 1755 mL    Total NET: 1491.6 mL      08-29 @ 07:01  -  08-30 @ 01:53  --------------------------------------------------------  IN:    dextrose 5% + lactated ringers.: 1800 mL    Enteral Tube Flush: 40 mL    IV PiggyBack: 100 mL    Solution: 200 mL  Total IN: 2140 mL    OUT:    Indwelling Catheter - Urethral: 350 mL    PEG (Percutaneous Endoscopic Gastrostomy) Tube: 350 mL    Voided: 400 mL  Total OUT: 1100 mL    Total NET: 1040 mL                      8.9                  138  | 19   | 17           11.3  >-----------< 258     ------------------------< 174                   28.1                 3.7  | 105  | 0.99                                         Ca 8.6   Mg 1.7   Ph 4.1        [ ] Blackman catheter, indication: N/A  Meds: dextrose 5% + lactated ringers. 1000 milliLiter(s) IV Continuous <Continuous>        HEMATOLOGIC  Meds: enoxaparin Injectable 40 milliGRAM(s) SubCutaneous daily    [x] VTE Prophylaxis             PT/INR -  13.9 sec / 1.28 ratio   ( 30 Aug 2018 03:57 )       PTT -  37.4 sec   ( 30 Aug 2018 03:57 )  Transfusion     [ ] PRBC   [ ] Platelets   [ ] FFP   [ ] Cryoprecipitate      INFECTIOUS DISEASES  WBC Count: 12.0 K/uL (08-29 @ 03:01)    RECENT CULTURES:  Specimen Source: .Blood Blood-Peripheral  Date/Time: 08-28 @ 05:35  Culture Results:   No growth to date.  Gram Stain: --  Organism: --  Specimen Source: .Blood Blood  Date/Time: 08-28 @ 02:49  Culture Results:   No growth to date.  Gram Stain: --  Organism: --  Specimen Source: .Blood Blood  Date/Time: 08-26 @ 23:03  Culture Results:   No growth to date.  Gram Stain: --  Organism: --    Meds: cefepime   IVPB 1000 milliGRAM(s) IV Intermittent every 24 hours  cefepime   IVPB      erythromycin    ethylsuccinate Suspension 40 mG/mL 500 milliGRAM(s) Oral every 8 hours  metroNIDAZOLE  IVPB      metroNIDAZOLE  IVPB 500 milliGRAM(s) IV Intermittent every 8 hours  vancomycin  IVPB 500 milliGRAM(s) IV Intermittent every 24 hours        ENDOCRINE  CAPILLARY BLOOD GLUCOSE      POCT Blood Glucose.: 139 mg/dL (30 Aug 2018 00:20)  POCT Blood Glucose.: 97 mg/dL (29 Aug 2018 17:09)  POCT Blood Glucose.: 207 mg/dL (29 Aug 2018 12:13)  POCT Blood Glucose.: 162 mg/dL (29 Aug 2018 05:46)    Meds: insulin lispro (HumaLOG) corrective regimen sliding scale   SubCutaneous every 6 hours  methylPREDNISolone sodium succinate Injectable 8 milliGRAM(s) IV Push every 24 hours        ACCESS DEVICES:  [x] Peripheral IV  [] Central Venous Line	[ ] R	[ ] [ ] IJ	[ ] Fem	[ ] SC	Placed:   [ x] Arterial Line		[ ] R	[x ] L	[x ] Fem	[ ] Rad	[ ] Ax	Placed:   (x ] PICC:					[ ] Mediport  [ ] Urinary Catheter, Date Placed:   [x] Necessity of urinary, arterial, and venous catheters discussed    OTHER MEDICATIONS:  chlorhexidine 4% Liquid 1 Application(s) Topical <User Schedule>  lidocaine   Patch 1 Patch Transdermal every 24 hours  nystatin Powder 1 Application(s) Topical two times a day PRN      CODE STATUS: full code      IMAGING: < from: CT Abdomen and Pelvis No Cont (08.27.18 @ 09:35) >  IMPRESSION:     No acute intra abdominal pathology or collection.    Left acetabular fracture as at recent prior imaging.                  < end of copied text >

## 2018-08-30 NOTE — PROGRESS NOTE ADULT - SUBJECTIVE AND OBJECTIVE BOX
---___---___---___---___---___---___ ---___---___---___---___---___---___---___---___---___---                    <<<  M E D I C A L   A T T E N D I N G    F O L L O W    U P   N O T E  >>>    still in icu and slightly lethargic not intubated      ---___---___---___---___---___---      <<<  MEDICATIONS:  >>>    MEDICATIONS  (STANDING):  ALBUTerol/ipratropium for Nebulization 3 milliLiter(s) Nebulizer every 6 hours  buDESOnide   0.5 milliGRAM(s) Respule 0.5 milliGRAM(s) Inhalation every 12 hours  cefepime   IVPB 1000 milliGRAM(s) IV Intermittent every 24 hours  cefepime   IVPB      chlorhexidine 4% Liquid 1 Application(s) Topical <User Schedule>  clonazePAM Tablet 2 milliGRAM(s) Oral <User Schedule>  dextrose 5% + lactated ringers. 1000 milliLiter(s) (30 mL/Hr) IV Continuous <Continuous>  enoxaparin Injectable 40 milliGRAM(s) SubCutaneous daily  erythromycin    ethylsuccinate Suspension 40 mG/mL 500 milliGRAM(s) Oral every 8 hours  insulin lispro (HumaLOG) corrective regimen sliding scale   SubCutaneous every 6 hours  lidocaine   Patch 1 Patch Transdermal every 24 hours  methylPREDNISolone sodium succinate Injectable 8 milliGRAM(s) IV Push every 24 hours  metroNIDAZOLE  IVPB      metroNIDAZOLE  IVPB 500 milliGRAM(s) IV Intermittent every 8 hours  mirtazapine 45 milliGRAM(s) Oral <User Schedule>  pantoprazole  Injectable 40 milliGRAM(s) IV Push every 24 hours  QUEtiapine 50 milliGRAM(s) Oral <User Schedule>  sertraline 50 milliGRAM(s) Oral daily  vancomycin  IVPB 500 milliGRAM(s) IV Intermittent every 24 hours      MEDICATIONS  (PRN):  nystatin Powder 1 Application(s) Topical two times a day PRN rash/itchiness       ---___---___---___---___---___---     <<<REVIEW OF SYSTEM: >>>    GEN: no fever, no chills, no pain  RESP: no SOB, no cough, no sputum  CVS: no chest pain, no palpitations, no edema  GI: no abdominal pain, no nausea, no vomiting, no constipation, no diarrhea  : no dysurea, no frequency  NEURO: no headache, no dizziness  PSYCH: no depression, not anxious  Derm : no itching, no rash     ---___---___---___---___---___---          <<<  VITAL SIGNS: >>>    T(F): 98.6 (08-30-18 @ 07:00), Max: 98.6 (08-30-18 @ 07:00)  HR: 102 (08-30-18 @ 11:18) (90 - 110)  BP: --  RR: 11 (08-30-18 @ 11:00) (1 - 29)  SpO2: 100% (08-30-18 @ 11:18) (98% - 100%)  Wt(kg): --  CAPILLARY BLOOD GLUCOSE      POCT Blood Glucose.: 185 mg/dL (30 Aug 2018 12:13)    I&O's Summary    29 Aug 2018 07:01  -  30 Aug 2018 07:00  --------------------------------------------------------  IN: 2740 mL / OUT: 1650 mL / NET: 1090 mL    30 Aug 2018 07:01  -  30 Aug 2018 12:34  --------------------------------------------------------  IN: 330 mL / OUT: 0 mL / NET: 330 mL         ---___---___---___---___---___---                       PHYSICAL EXAM:    GEN: A&O X 2 , NAD , comfortable  HEENT: NCAT, PERRL, MMM, no scleral icterus, hearing intact  NECK: Supple, No JVD  CVS: S1S2 , regular , No M/R/G appreciated  PULM: CTA B/L,  no W/R/R appreciated  ABD.: soft. non tender, non distended,  bowel sounds present peg to gravity  Extrem: intact pulses , no edema noted  Derm: No rash or ecchymosis noted  PSYCH: normal mood,  depression, not anxious     ---___---___---___---___---___---     <<<  LAB AND IMAGING: >>>                          8.9    11.3  )-----------( 258      ( 30 Aug 2018 03:57 )             28.1               08-30    138  |  105  |  17  ----------------------------<  174<H>  3.7   |  19<L>  |  0.99    Ca    8.6      30 Aug 2018 03:57  Phos  4.1     08-30  Mg     1.7     08-30    TPro  4.9<L>  /  Alb  1.8<L>  /  TBili  0.4  /  DBili  0.2  /  AST  17  /  ALT  9<L>  /  AlkPhos  141<H>  08-30    PT/INR - ( 30 Aug 2018 03:57 )   PT: 13.9 sec;   INR: 1.28 ratio         PTT - ( 30 Aug 2018 03:57 )  PTT:37.4 sec                   ABG - ( 30 Aug 2018 03:38 )  pH, Arterial: 7.45  pH, Blood: x     /  pCO2: 32    /  pO2: 94    / HCO3: 22    / Base Excess: -1.1  /  SaO2: 98                      [All pertinent / recent available Imaging reports and other labs reviewed]     ---___---___---___---___---___---___ ---___---___---___---___---           <<<  A S S E S S M E N T   A N D   P L A N :  >>>          -GI/DVT Prophylaxis.    -     >>______________________<<      Deniz Bolden .         phone   [phone]

## 2018-08-30 NOTE — PROGRESS NOTE ADULT - SUBJECTIVE AND OBJECTIVE BOX
MYRIAM WHITE:[id number],   68yFemale followed for:  ASA; dye contrast (Anaphylaxis)  aspirin (Short breath)  divalproex sodium (Other (Unknown))  Haldol (Other (Unknown))  penicillin (Short breath; Rash)  sulfa drugs (Short breath; Rash)  Xanax (Other (Unknown))    PAST MEDICAL & SURGICAL HISTORY:  CVA (cerebral vascular accident)  Fatty pancreas  PNA (pneumonia)  Pulmonary HTN  IGT (impaired glucose tolerance)  Ulcerative colitis  Acid reflux  Anxiety  Depression  Mouth sores  HLD (hyperlipidemia)  Asthma  Humeral head fracture  H/O: hysterectomy  H/O cataract extraction, left  History of knee replacement    FAMILY HISTORY:  Family history of dementia (Grandparent)  Family history of colon cancer (Grandparent)    MEDICATIONS  (STANDING):  ALBUTerol/ipratropium for Nebulization 3 milliLiter(s) Nebulizer every 6 hours  buDESOnide   0.5 milliGRAM(s) Respule 0.5 milliGRAM(s) Inhalation every 12 hours  cefepime   IVPB 1000 milliGRAM(s) IV Intermittent every 24 hours  cefepime   IVPB      chlorhexidine 4% Liquid 1 Application(s) Topical <User Schedule>  clonazePAM Tablet 2 milliGRAM(s) Oral <User Schedule>  dextrose 5% + lactated ringers. 1000 milliLiter(s) (100 mL/Hr) IV Continuous <Continuous>  enoxaparin Injectable 40 milliGRAM(s) SubCutaneous daily  erythromycin    ethylsuccinate Suspension 40 mG/mL 500 milliGRAM(s) Oral every 8 hours  insulin lispro (HumaLOG) corrective regimen sliding scale   SubCutaneous every 6 hours  lidocaine   Patch 1 Patch Transdermal every 24 hours  methylPREDNISolone sodium succinate Injectable 8 milliGRAM(s) IV Push every 24 hours  metroNIDAZOLE  IVPB      metroNIDAZOLE  IVPB 500 milliGRAM(s) IV Intermittent every 8 hours  mirtazapine 45 milliGRAM(s) Oral <User Schedule>  pantoprazole  Injectable 40 milliGRAM(s) IV Push every 24 hours  QUEtiapine 50 milliGRAM(s) Oral <User Schedule>  sertraline 50 milliGRAM(s) Oral daily  vancomycin  IVPB 500 milliGRAM(s) IV Intermittent every 24 hours    MEDICATIONS  (PRN):  nystatin Powder 1 Application(s) Topical two times a day PRN rash/itchiness      Vital Signs Last 24 Hrs  T(C): 37 (30 Aug 2018 07:00), Max: 37.1 (29 Aug 2018 11:00)  T(F): 98.6 (30 Aug 2018 07:00), Max: 98.8 (29 Aug 2018 11:00)  HR: 108 (30 Aug 2018 07:00) (90 - 116)  BP: --  BP(mean): --  RR: 27 (30 Aug 2018 07:00) (1 - 36)  SpO2: 98% (30 Aug 2018 07:00) (84% - 100%)  nc/at  s1s2  cta  soft, nt, nd no guarding or rebound  no c/c/e    CBC Full  -  ( 30 Aug 2018 03:57 )  WBC Count : 11.3 K/uL  Hemoglobin : 8.9 g/dL  Hematocrit : 28.1 %  Platelet Count - Automated : 258 K/uL  Mean Cell Volume : 90.3 fl  Mean Cell Hemoglobin : 28.6 pg  Mean Cell Hemoglobin Concentration : 31.6 gm/dL  Auto Neutrophil # : x  Auto Lymphocyte # : x  Auto Monocyte # : x  Auto Eosinophil # : x  Auto Basophil # : x  Auto Neutrophil % : x  Auto Lymphocyte % : x  Auto Monocyte % : x  Auto Eosinophil % : x  Auto Basophil % : x    08-30    138  |  105  |  17  ----------------------------<  174<H>  3.7   |  19<L>  |  0.99    Ca    8.6      30 Aug 2018 03:57  Phos  4.1     08-30  Mg     1.7     08-30    TPro  4.9<L>  /  Alb  1.8<L>  /  TBili  0.4  /  DBili  0.2  /  AST  17  /  ALT  9<L>  /  AlkPhos  141<H>  08-30    PT/INR - ( 30 Aug 2018 03:57 )   PT: 13.9 sec;   INR: 1.28 ratio         PTT - ( 30 Aug 2018 03:57 )  PTT:37.4 sec

## 2018-08-30 NOTE — PROGRESS NOTE ADULT - ASSESSMENT
69 yo female with dementia, history of UC, and s/p R Hip hemiarthroplasty 6/22/18, post op hematoma  Admitted after left hip fx, noted severe sepsis, MRSA bacteremia, infected R hip- s/p GABRIEL and spacer  Stormy post op course with subsequent episode of sepsis- unclear source, resolved   S/p PEG and febrile again, transient low bp with no source apparent again.   Covered with Cefepime and Flagyl, along with same Vanco  Remains afebrile, surveillance Bld Cxs negative, CT ABD negative, CXR no consolidation, WBC lower    Plan:  Continue vanco (latest trough of 14.8 acceptable), cefepime and flagyl for now as well  Follow temps and CBC/diff   Support as outlined  D/w family at bedside

## 2018-08-30 NOTE — PROVIDER CONTACT NOTE (OTHER) - ASSESSMENT
Afebrile.  Bp 144/67. No s/s of resp distress on 3L NC. PEG site assessed. No redness or s.s of infection. PEG currently clamped after given 2200 meds.

## 2018-08-30 NOTE — PROGRESS NOTE ADULT - ASSESSMENT
pt afebrile, g tube to gravity.  conisder starting feeds (slow) with possible reglan iv when stable.

## 2018-08-31 DIAGNOSIS — T81.30XA DISRUPTION OF WOUND, UNSPECIFIED, INITIAL ENCOUNTER: ICD-10-CM

## 2018-08-31 LAB
ALBUMIN SERPL ELPH-MCNC: 1.8 G/DL — LOW (ref 3.3–5)
ALP SERPL-CCNC: 135 U/L — HIGH (ref 40–120)
ALT FLD-CCNC: 10 U/L — SIGNIFICANT CHANGE UP (ref 10–45)
ANION GAP SERPL CALC-SCNC: 12 MMOL/L — SIGNIFICANT CHANGE UP (ref 5–17)
APTT BLD: 35.7 SEC — SIGNIFICANT CHANGE UP (ref 27.5–37.4)
AST SERPL-CCNC: 16 U/L — SIGNIFICANT CHANGE UP (ref 10–40)
BILIRUB SERPL-MCNC: 0.3 MG/DL — SIGNIFICANT CHANGE UP (ref 0.2–1.2)
BUN SERPL-MCNC: 18 MG/DL — SIGNIFICANT CHANGE UP (ref 7–23)
CA-I BLD-SCNC: 1.2 MMOL/L — SIGNIFICANT CHANGE UP (ref 1.12–1.3)
CALCIUM SERPL-MCNC: 8.2 MG/DL — LOW (ref 8.4–10.5)
CHLORIDE SERPL-SCNC: 107 MMOL/L — SIGNIFICANT CHANGE UP (ref 96–108)
CO2 SERPL-SCNC: 19 MMOL/L — LOW (ref 22–31)
CREAT SERPL-MCNC: 0.98 MG/DL — SIGNIFICANT CHANGE UP (ref 0.5–1.3)
GLUCOSE SERPL-MCNC: 132 MG/DL — HIGH (ref 70–99)
HCT VFR BLD CALC: 26.2 % — LOW (ref 34.5–45)
HGB BLD-MCNC: 8.4 G/DL — LOW (ref 11.5–15.5)
INR BLD: 1.32 RATIO — HIGH (ref 0.88–1.16)
MAGNESIUM SERPL-MCNC: 1.9 MG/DL — SIGNIFICANT CHANGE UP (ref 1.6–2.6)
MCHC RBC-ENTMCNC: 28.8 PG — SIGNIFICANT CHANGE UP (ref 27–34)
MCHC RBC-ENTMCNC: 32 GM/DL — SIGNIFICANT CHANGE UP (ref 32–36)
MCV RBC AUTO: 89.9 FL — SIGNIFICANT CHANGE UP (ref 80–100)
PHOSPHATE SERPL-MCNC: 4.4 MG/DL — SIGNIFICANT CHANGE UP (ref 2.5–4.5)
PLATELET # BLD AUTO: 260 K/UL — SIGNIFICANT CHANGE UP (ref 150–400)
POTASSIUM SERPL-MCNC: 3.8 MMOL/L — SIGNIFICANT CHANGE UP (ref 3.5–5.3)
POTASSIUM SERPL-SCNC: 3.8 MMOL/L — SIGNIFICANT CHANGE UP (ref 3.5–5.3)
PROCALCITONIN SERPL-MCNC: 27.09 NG/ML — HIGH (ref 0.02–0.1)
PROT SERPL-MCNC: 4.9 G/DL — LOW (ref 6–8.3)
PROTHROM AB SERPL-ACNC: 14.5 SEC — HIGH (ref 9.8–12.7)
RBC # BLD: 2.92 M/UL — LOW (ref 3.8–5.2)
RBC # FLD: 13.8 % — SIGNIFICANT CHANGE UP (ref 10.3–14.5)
SODIUM SERPL-SCNC: 138 MMOL/L — SIGNIFICANT CHANGE UP (ref 135–145)
WBC # BLD: 9.2 K/UL — SIGNIFICANT CHANGE UP (ref 3.8–10.5)
WBC # FLD AUTO: 9.2 K/UL — SIGNIFICANT CHANGE UP (ref 3.8–10.5)

## 2018-08-31 PROCEDURE — 99232 SBSQ HOSP IP/OBS MODERATE 35: CPT | Mod: GC

## 2018-08-31 PROCEDURE — 71045 X-RAY EXAM CHEST 1 VIEW: CPT | Mod: 26

## 2018-08-31 RX ORDER — POTASSIUM CHLORIDE 20 MEQ
20 PACKET (EA) ORAL ONCE
Qty: 0 | Refills: 0 | Status: COMPLETED | OUTPATIENT
Start: 2018-08-31 | End: 2018-08-31

## 2018-08-31 RX ORDER — MAGNESIUM SULFATE 500 MG/ML
2 VIAL (ML) INJECTION ONCE
Qty: 0 | Refills: 0 | Status: COMPLETED | OUTPATIENT
Start: 2018-08-31 | End: 2018-08-31

## 2018-08-31 RX ADMIN — Medication 20 MILLIEQUIVALENT(S): at 05:46

## 2018-08-31 RX ADMIN — CHLORHEXIDINE GLUCONATE 1 APPLICATION(S): 213 SOLUTION TOPICAL at 05:46

## 2018-08-31 RX ADMIN — Medication 8 MILLIGRAM(S): at 00:28

## 2018-08-31 RX ADMIN — Medication 2 MILLIGRAM(S): at 22:09

## 2018-08-31 RX ADMIN — LIDOCAINE 1 PATCH: 4 CREAM TOPICAL at 22:10

## 2018-08-31 RX ADMIN — Medication 2: at 11:19

## 2018-08-31 RX ADMIN — MIRTAZAPINE 45 MILLIGRAM(S): 45 TABLET, ORALLY DISINTEGRATING ORAL at 22:10

## 2018-08-31 RX ADMIN — Medication 100 MILLIGRAM(S): at 13:07

## 2018-08-31 RX ADMIN — PANTOPRAZOLE SODIUM 40 MILLIGRAM(S): 20 TABLET, DELAYED RELEASE ORAL at 11:20

## 2018-08-31 RX ADMIN — CEFEPIME 100 MILLIGRAM(S): 1 INJECTION, POWDER, FOR SOLUTION INTRAMUSCULAR; INTRAVENOUS at 00:18

## 2018-08-31 RX ADMIN — QUETIAPINE FUMARATE 50 MILLIGRAM(S): 200 TABLET, FILM COATED ORAL at 22:09

## 2018-08-31 RX ADMIN — Medication 100 MILLIGRAM(S): at 05:45

## 2018-08-31 RX ADMIN — SERTRALINE 50 MILLIGRAM(S): 25 TABLET, FILM COATED ORAL at 11:01

## 2018-08-31 RX ADMIN — Medication 0.5 MILLIGRAM(S): at 11:34

## 2018-08-31 RX ADMIN — LIDOCAINE 1 PATCH: 4 CREAM TOPICAL at 09:55

## 2018-08-31 RX ADMIN — Medication 2: at 06:13

## 2018-08-31 RX ADMIN — Medication 3 MILLILITER(S): at 11:33

## 2018-08-31 RX ADMIN — Medication 500 MILLIGRAM(S): at 11:00

## 2018-08-31 RX ADMIN — Medication 500 MILLIGRAM(S): at 02:33

## 2018-08-31 RX ADMIN — Medication 50 GRAM(S): at 05:46

## 2018-08-31 RX ADMIN — Medication 500 MILLIGRAM(S): at 17:51

## 2018-08-31 RX ADMIN — NYSTATIN CREAM 1 APPLICATION(S): 100000 CREAM TOPICAL at 22:10

## 2018-08-31 RX ADMIN — Medication 100 MILLIGRAM(S): at 22:09

## 2018-08-31 RX ADMIN — Medication 3 MILLILITER(S): at 17:03

## 2018-08-31 RX ADMIN — ENOXAPARIN SODIUM 40 MILLIGRAM(S): 100 INJECTION SUBCUTANEOUS at 11:01

## 2018-08-31 RX ADMIN — Medication 3 MILLILITER(S): at 06:01

## 2018-08-31 NOTE — PROGRESS NOTE ADULT - ATTENDING COMMENTS
Dr. Arias (Attending Physician)  Residuals improved. Will start trickle tube feeds today.  WBC downtrending procalcitonin sent and very elevated, but was not sent went patient readmitted with septic shock. c/w abx ID following. Blood cx negative.  Source still most likely pulmonary in origin from aspiration.  Orthopedics managing thigh wound.  D/C a-line.

## 2018-08-31 NOTE — PROGRESS NOTE ADULT - ASSESSMENT
MYRIAM HEATH is a 68y Female with history of asthma, CVA, pulmonary HTN presents with septic shock from MRSA bacteremia secondary to infected right hemiarthroplasty hardware s/p right hip I&D, explantation of right hemiarthroplasty, placement of antibiotic spacer, and right femur ORIF. Readmitted to SICU 8/27 with septic shock requiring pressors.    PLAN:  NEURO: h/o anxiety, depression, dementia; lethargy improving  - Monitor mental status.   - Giving home regimen dose of remeron, seroquil, and klonopin at bedtime   - Pain control with Tylenol and Lidoderm patch.    RESPIRATORY: h/o asthma  - Monitor pulse oximeter and ABGs.  - Out of bed to chair, incentive spirometry, and pulmonary toileting to prevent atelectasis.  - Home prednisone 10 mg daily, Pulmicort, and Duoneb for asthma.    CARDIOVASCULAR: Pulmonary HTN   - Monitor vital signs.  - Reassess volume status and administer fluid as necessary    GI/NUTRITION: dysphagia  - NPO with G-tube to gravity, CTM residuals   - Protonix for GERD.    GENITOURINARY/RENAL: CKD stage 2  - Monitor I&Os. Blackman replaced  - Monitor electrolytes and replete as necessary.  - IVF to 30 cc    HEMATOLOGIC: no acute issues  - Lovenox for VTE prophylaxis.    INFECTIOUS DISEASE: MRSA bacteremia from infected right hip, Candiduria, & PNA, resolved, sepsis   - Cefepime, flagyl, and vancomycin for empiric antibiotics  - Follow up cultures    ENDOCRINE: hyperglycemia  - ISS q 6    Esha Santos PGY-2  [id number]

## 2018-08-31 NOTE — PROGRESS NOTE ADULT - ASSESSMENT
68F s/p R hip PJI explant and placement of cement spacer with distal femur ORIF    Daily wet to dry dressing changes - clean with betadiene  Pain control  appreciate SICU care  appreciate recommendations regarding nutritional optimization  NWB right LE  Flat foot weight bearing left LE    Ortho [id number]

## 2018-08-31 NOTE — PROGRESS NOTE ADULT - SUBJECTIVE AND OBJECTIVE BOX
Ortho Progress Note    S: Patient seen and examined. No acute events overnight. Pain well controlled with current regimen. Denies lightheadedness/dizziness, CP/SOB.       O:  Physical Exam:  Gen: Laying in bed, NAD, alert   Resp: Unlabored breathing  Ext: EHL/FHL/TA/Sol intact          + SILT DP/SP/REED/Sa          +DP, extremity WWP  incision with some purulent discharge in the distal aspect,       Vital Signs Last 24 Hrs  T(C): 36.5 (31 Aug 2018 03:00), Max: 37.1 (30 Aug 2018 15:00)  T(F): 97.7 (31 Aug 2018 03:00), Max: 98.7 (30 Aug 2018 15:00)  HR: 109 (31 Aug 2018 06:01) (99 - 118)  BP: --  BP(mean): --  RR: 24 (31 Aug 2018 06:00) (10 - 32)  SpO2: 100% (31 Aug 2018 06:01) (80% - 100%)                          8.4    9.2   )-----------( 260      ( 31 Aug 2018 02:34 )             26.2                         8.9    11.3  )-----------( 258      ( 30 Aug 2018 03:57 )             28.1       08-31    138  |  107  |  18  ----------------------------<  132<H>  3.8   |  19<L>  |  0.98        PT/INR - ( 31 Aug 2018 02:34 )   PT: 14.5 sec;   INR: 1.32 ratio         PTT - ( 31 Aug 2018 02:34 )  PTT:35.7 sec

## 2018-08-31 NOTE — PROGRESS NOTE ADULT - SUBJECTIVE AND OBJECTIVE BOX
INTERVAL HPI/OVERNIGHT EVENTS: remains stable from gi perspective, sepsis resolving, tolerating feeds    MEDICATIONS  (STANDING):  ALBUTerol/ipratropium for Nebulization 3 milliLiter(s) Nebulizer every 6 hours  buDESOnide   0.5 milliGRAM(s) Respule 0.5 milliGRAM(s) Inhalation every 12 hours  cefepime   IVPB 1000 milliGRAM(s) IV Intermittent every 24 hours  cefepime   IVPB      chlorhexidine 4% Liquid 1 Application(s) Topical <User Schedule>  clonazePAM Tablet 2 milliGRAM(s) Oral <User Schedule>  dextrose 5% + lactated ringers. 1000 milliLiter(s) (30 mL/Hr) IV Continuous <Continuous>  enoxaparin Injectable 40 milliGRAM(s) SubCutaneous daily  erythromycin    ethylsuccinate Suspension 40 mG/mL 500 milliGRAM(s) Oral every 8 hours  insulin lispro (HumaLOG) corrective regimen sliding scale   SubCutaneous every 6 hours  lidocaine   Patch 1 Patch Transdermal every 24 hours  methylPREDNISolone sodium succinate Injectable 8 milliGRAM(s) IV Push every 24 hours  metroNIDAZOLE  IVPB      metroNIDAZOLE  IVPB 500 milliGRAM(s) IV Intermittent every 8 hours  mirtazapine 45 milliGRAM(s) Oral <User Schedule>  pantoprazole  Injectable 40 milliGRAM(s) IV Push every 24 hours  QUEtiapine 50 milliGRAM(s) Oral <User Schedule>  sertraline 50 milliGRAM(s) Oral daily  vancomycin  IVPB 500 milliGRAM(s) IV Intermittent every 24 hours    MEDICATIONS  (PRN):  nystatin Powder 1 Application(s) Topical two times a day PRN rash/itchiness      Allergies    ASA; dye contrast (Anaphylaxis)  aspirin (Short breath)  divalproex sodium (Other (Unknown))  Haldol (Other (Unknown))  penicillin (Short breath; Rash)  sulfa drugs (Short breath; Rash)  Xanax (Other (Unknown))    Intolerances            PHYSICAL EXAM:   Vital Signs:  Vital Signs Last 24 Hrs  T(C): 36.4 (31 Aug 2018 08:00), Max: 37.1 (30 Aug 2018 15:00)  T(F): 97.5 (31 Aug 2018 08:00), Max: 98.7 (30 Aug 2018 15:00)  HR: 106 (31 Aug 2018 08:00) (99 - 118)  BP: 130/66 (31 Aug 2018 08:00) (130/66 - 130/66)  BP(mean): 94 (31 Aug 2018 08:00) (94 - 94)  RR: 19 (31 Aug 2018 08:00) (11 - 32)  SpO2: 100% (31 Aug 2018 08:00) (80% - 100%)  Daily     Daily Weight in k.9 (31 Aug 2018 05:25)    GENERAL:  no distress  HEENT:  NC/AT,  anicteric  CHEST:   no increased effort, breath sounds clear  HEART:  Regular rhythm  ABDOMEN:  Soft, non-tender, non-distended, normoactive bowel sounds,  no masses ,no hepato-splenomegaly, no signs of chronic liver disease peg site clean  EXTEREMITIES:  no cyanosis      LABS:                        8.4    9.2   )-----------( 260      ( 31 Aug 2018 02:34 )             26.2         138  |  107  |  18  ----------------------------<  132<H>  3.8   |  19<L>  |  0.98    Ca    8.2<L>      31 Aug 2018 02:34  Phos  4.4       Mg     1.9         TPro  4.9<L>  /  Alb  1.8<L>  /  TBili  0.3  /  DBili  x   /  AST  16  /  ALT  10  /  AlkPhos  135<H>      PT/INR - ( 31 Aug 2018 02:34 )   PT: 14.5 sec;   INR: 1.32 ratio         PTT - ( 31 Aug 2018 02:34 )  PTT:35.7 sec      RADIOLOGY & ADDITIONAL TESTS:

## 2018-08-31 NOTE — PROGRESS NOTE ADULT - SUBJECTIVE AND OBJECTIVE BOX
CC: f/u for MRSA bacteremia, infected R hip, resp failure    Patient remains in ICU    REVIEW OF SYSTEMS:  All other review of systems negative (Comprehensive ROS)- limited    Antimicrobials:  Day   cefepime   IVPB 1000 milliGRAM(s) IV Intermittent every 24 hours  cefepime   IVPB      erythromycin    ethylsuccinate Suspension 40 mG/mL 500 milliGRAM(s) Oral every 8 hours  metroNIDAZOLE  IVPB      metroNIDAZOLE  IVPB 500 milliGRAM(s) IV Intermittent every 8 hours  vancomycin  IVPB 500 milliGRAM(s) IV Intermittent every 24 hours    Other Medications Reviewed    ICU Vital Signs Last 24 Hrs  T(C): 37.1 (31 Aug 2018 16:00), Max: 37.1 (31 Aug 2018 16:00)  T(F): 98.8 (31 Aug 2018 16:00), Max: 98.8 (31 Aug 2018 16:00)  HR: 102 (31 Aug 2018 17:00) (102 - 118)  BP: 122/60 (31 Aug 2018 17:00) (122/60 - 131/56)  BP(mean): 86 (31 Aug 2018 17:00) (83 - 94)  ABP: 142/67 (31 Aug 2018 15:00) (118/50 - 148/72)  ABP(mean): 98 (31 Aug 2018 15:00) (79 - 105)  RR: 27 (31 Aug 2018 17:00) (12 - 29)  SpO2: 100% (31 Aug 2018 17:00) (80% - 100%)      PHYSICAL EXAM:  General: lethargic, no acute distress  Eyes:  anicteric, no conjunctival injection, no discharge  Oropharynx: no lesions or injection 	  Neck: supple, without adenopathy  Lungs: decreased BSs bases  Heart: regular rate and rhythm; no murmur, rubs or gallops  Abdomen: soft, nondistended, nontender, G tube site clean  Skin: no lesions  Extremities: no edema.  R hip wound clean and intact.  L PICC site clean  Neurologic: lethargic, moves all extremities    LAB RESULTS:                                   8.4    9.2   )-----------( 260      ( 31 Aug 2018 02:34 )             26.2   08-    138  |  107  |  18  ----------------------------<  132<H>  3.8   |  19<L>  |  0.98    Ca    8.2<L>      31 Aug 2018 02:34  Phos  4.4       Mg     1.9         TPro  4.9<L>  /  Alb  1.8<L>  /  TBili  0.3  /  DBili  x   /  AST  16  /  ALT  10  /  AlkPhos  135<H>            Urinalysis Basic - ( 28 Aug 2018 03:39 )    Color: Yellow / Appearance: Clear / S.014 / pH: x  Gluc: x / Ketone: Negative  / Bili: Negative / Urobili: Negative   Blood: x / Protein: 30 mg/dL / Nitrite: Negative   Leuk Esterase: Negative / RBC: 10-25 /HPF / WBC 3-5 /HPF   Sq Epi: x / Non Sq Epi: x / Bacteria: Few /HPF      MICROBIOLOGY:  RECENT CULTURES:  Culture - Blood (18 @ 05:35)    Specimen Source: .Blood Blood-Peripheral    Culture Results:   No growth to date.     @ 23:03 .Blood Blood     No growth to date.      RADIOLOGY REVIEWED:  Xray Chest 1 View- PORTABLE-Routine (18 @ 06:57) >  Left small pleural effusion and/or atelectasis. The right lung is grossly clear given the limitations of the   exam.    CT Abdomen and Pelvis No Cont (18 @ 09:35) >  No acute intra abdominal pathology or collection.  Left acetabular fracture as at recent prior imaging. CC: f/u for MRSA bacteremia, infected R hip, resp failure    Patient remains in ICU, no new c/o, afebrile at present    REVIEW OF SYSTEMS:  All other review of systems negative (Comprehensive ROS)- limited    Antimicrobials:  Day   cefepime   IVPB 1000 milliGRAM(s) IV Intermittent every 24 hours  cefepime   IVPB      erythromycin    ethylsuccinate Suspension 40 mG/mL 500 milliGRAM(s) Oral every 8 hours  metroNIDAZOLE  IVPB      metroNIDAZOLE  IVPB 500 milliGRAM(s) IV Intermittent every 8 hours  vancomycin  IVPB 500 milliGRAM(s) IV Intermittent every 24 hours    Other Medications Reviewed    ICU Vital Signs Last 24 Hrs  T(C): 37.1 (31 Aug 2018 16:00), Max: 37.1 (31 Aug 2018 16:00)  T(F): 98.8 (31 Aug 2018 16:00), Max: 98.8 (31 Aug 2018 16:00)  HR: 102 (31 Aug 2018 17:00) (102 - 118)  BP: 122/60 (31 Aug 2018 17:00) (122/60 - 131/56)  BP(mean): 86 (31 Aug 2018 17:00) (83 - 94)  ABP: 142/67 (31 Aug 2018 15:00) (118/50 - 148/72)  ABP(mean): 98 (31 Aug 2018 15:00) (79 - 105)  RR: 27 (31 Aug 2018 17:00) (12 - 29)  SpO2: 100% (31 Aug 2018 17:00) (80% - 100%)      PHYSICAL EXAM:  General: lethargic, no acute distress  Eyes:  anicteric, no conjunctival injection, no discharge  Oropharynx: no lesions or injection 	  Neck: supple, without adenopathy  Lungs: decreased BSs bases  Heart: regular rate and rhythm; no murmur, rubs or gallops  Abdomen: soft, nondistended, nontender, G tube site clean  Skin: no lesions  Extremities: no edema.  R hip wound clean and intact.  L PICC site clean  Neurologic: lethargic, moves all extremities    LAB RESULTS:                                   8.4    9.2   )-----------( 260      ( 31 Aug 2018 02:34 )             26.2   08-    138  |  107  |  18  ----------------------------<  132<H>  3.8   |  19<L>  |  0.98    Ca    8.2<L>      31 Aug 2018 02:34  Phos  4.4       Mg     1.9         TPro  4.9<L>  /  Alb  1.8<L>  /  TBili  0.3  /  DBili  x   /  AST  16  /  ALT  10  /  AlkPhos  135<H>            Urinalysis Basic - ( 28 Aug 2018 03:39 )    Color: Yellow / Appearance: Clear / S.014 / pH: x  Gluc: x / Ketone: Negative  / Bili: Negative / Urobili: Negative   Blood: x / Protein: 30 mg/dL / Nitrite: Negative   Leuk Esterase: Negative / RBC: 10-25 /HPF / WBC 3-5 /HPF   Sq Epi: x / Non Sq Epi: x / Bacteria: Few /HPF      MICROBIOLOGY:  RECENT CULTURES:  Culture - Blood (18 @ 05:35)    Specimen Source: .Blood Blood-Peripheral    Culture Results:   No growth to date.     @ 23:03 .Blood Blood     No growth to date.      RADIOLOGY REVIEWED:  Xray Chest 1 View- PORTABLE-Routine (18 @ 06:57) >  Left small pleural effusion and/or atelectasis. The right lung is grossly clear given the limitations of the   exam.    CT Abdomen and Pelvis No Cont (18 @ 09:35) >  No acute intra abdominal pathology or collection.  Left acetabular fracture as at recent prior imaging.

## 2018-08-31 NOTE — CONSULT NOTE ADULT - SUBJECTIVE AND OBJECTIVE BOX
HPI: 68y Female with history of asthma, CVA, pulmonary HTN s/p hip fx sec to osteoporosis due to ?steroid use s/p repair with subsequent surgical site infection and removal of hardware (7/27) with placement of antibiotic spacer device and right femur ORIF (8/4). The pt had persistent bacteremia with MRSA and was treated with Daptomycin and Ceftaroline followed by Vancomycin and meropenem. A PICC line was placed 8/22 for long term IV access recently which was changed 8/24 secondary to tip not in optimal position. She was also pegged on 8/24. She developed fevers on the floor and a CT A/P was performed 8/27 which was neg for acute path and PEG was found to be in place. She returned to SICU 8/27 after she was found to be hypotensive refractory to fluid challenge needs vasopressor support to maintain her hemodynamics. Panculture has been dose and started on broad spectrum antibiotics.    We were consulted because the RLE thigh incision is dehiscing.     Plastic Surgery Progress Note (pg [facility]: [id number], [facility]: [phone])    SUBJECTIVE:  Doing well. No overnight events.     OBJECTIVE:     ** VITAL SIGNS / I&O's **    Vital Signs Last 24 Hrs  T(C): 37.1 (31 Aug 2018 16:00), Max: 37.1 (31 Aug 2018 16:00)  T(F): 98.8 (31 Aug 2018 16:00), Max: 98.8 (31 Aug 2018 16:00)  HR: 105 (31 Aug 2018 18:00) (102 - 118)  BP: 120/58 (31 Aug 2018 18:00) (120/58 - 131/56)  BP(mean): 81 (31 Aug 2018 18:00) (81 - 94)  RR: 27 (31 Aug 2018 18:00) (12 - 29)  SpO2: 93% (31 Aug 2018 18:00) (80% - 100%)      30 Aug 2018 07:01  -  31 Aug 2018 07:00  --------------------------------------------------------  IN:    dextrose 5% + lactated ringers.: 200 mL    dextrose 5% + lactated ringers.: 660 mL    Enteral Tube Flush: 100 mL    IV PiggyBack: 200 mL    Solution: 350 mL  Total IN: 1510 mL    OUT:    Indwelling Catheter - Urethral: 560 mL    PEG (Percutaneous Endoscopic Gastrostomy) Tube: 400 mL    Voided: 950 mL  Total OUT: 1910 mL    Total NET: -400 mL      31 Aug 2018 07:01  -  31 Aug 2018 18:52  --------------------------------------------------------  IN:    dextrose 5% + lactated ringers.: 330 mL    Enteral Tube Flush: 150 mL    IV PiggyBack: 50 mL    Pivot: 40 mL  Total IN: 570 mL    OUT:    Indwelling Catheter - Urethral: 655 mL  Total OUT: 655 mL    Total NET: -85 mL          ** PHYSICAL EXAM **    -- CONSTITUTIONAL: NAD.   -- EXTREMITIES: RLE incision intact throughout except for the distal third; there was an area of superficial epidermolysis and fibrinous exudate present; upon gentle pressure, the incision opened easily about 4cm distally; there is healthy/viable muscle beneath; no signs of purulence or infection.      **MEDS**  ALBUTerol/ipratropium for Nebulization 3 milliLiter(s) Nebulizer every 6 hours  buDESOnide   0.5 milliGRAM(s) Respule 0.5 milliGRAM(s) Inhalation every 12 hours  cefepime   IVPB 1000 milliGRAM(s) IV Intermittent every 24 hours  cefepime   IVPB      chlorhexidine 4% Liquid 1 Application(s) Topical <User Schedule>  clonazePAM Tablet 2 milliGRAM(s) Oral <User Schedule>  dextrose 5% + lactated ringers. 1000 milliLiter(s) IV Continuous <Continuous>  enoxaparin Injectable 40 milliGRAM(s) SubCutaneous daily  erythromycin    ethylsuccinate Suspension 40 mG/mL 500 milliGRAM(s) Oral every 8 hours  insulin lispro (HumaLOG) corrective regimen sliding scale   SubCutaneous every 6 hours  lidocaine   Patch 1 Patch Transdermal every 24 hours  methylPREDNISolone sodium succinate Injectable 8 milliGRAM(s) IV Push every 24 hours  metroNIDAZOLE  IVPB      metroNIDAZOLE  IVPB 500 milliGRAM(s) IV Intermittent every 8 hours  mirtazapine 45 milliGRAM(s) Oral <User Schedule>  nystatin Powder 1 Application(s) Topical two times a day PRN  pantoprazole  Injectable 40 milliGRAM(s) IV Push every 24 hours  QUEtiapine 50 milliGRAM(s) Oral <User Schedule>  sertraline 50 milliGRAM(s) Oral daily  vancomycin  IVPB 500 milliGRAM(s) IV Intermittent every 24 hours      ** LABS **                          8.4    9.2   )-----------( 260      ( 31 Aug 2018 02:34 )             26.2     31 Aug 2018 02:34    138    |  107    |  18     ----------------------------<  132    3.8     |  19     |  0.98     Ca    8.2        31 Aug 2018 02:34  Phos  4.4       31 Aug 2018 02:34  Mg     1.9       31 Aug 2018 02:34    TPro  4.9    /  Alb  1.8    /  TBili  0.3    /  DBili  x      /  AST  16     /  ALT  10     /  AlkPhos  135    31 Aug 2018 02:34    PT/INR - ( 31 Aug 2018 02:34 )   PT: 14.5 sec;   INR: 1.32 ratio         PTT - ( 31 Aug 2018 02:34 )  PTT:35.7 sec  CAPILLARY BLOOD GLUCOSE      POCT Blood Glucose.: 112 mg/dL (31 Aug 2018 17:50)  POCT Blood Glucose.: 170 mg/dL (31 Aug 2018 11:15)  POCT Blood Glucose.: 189 mg/dL (31 Aug 2018 06:00)  POCT Blood Glucose.: 122 mg/dL (31 Aug 2018 00:25)

## 2018-08-31 NOTE — PROGRESS NOTE ADULT - SUBJECTIVE AND OBJECTIVE BOX
---___---___---___---___---___---___ ---___---___---___---___---___---___---___---___---___---                    <<<  M E D I C A L   A T T E N D I N G    F O L L O W    U P   N O T E  >>>    remains unchanged   ---___---___---___---___---___---      <<<  MEDICATIONS:  >>>    MEDICATIONS  (STANDING):  ALBUTerol/ipratropium for Nebulization 3 milliLiter(s) Nebulizer every 6 hours  buDESOnide   0.5 milliGRAM(s) Respule 0.5 milliGRAM(s) Inhalation every 12 hours  cefepime   IVPB 1000 milliGRAM(s) IV Intermittent every 24 hours  cefepime   IVPB      chlorhexidine 4% Liquid 1 Application(s) Topical <User Schedule>  clonazePAM Tablet 2 milliGRAM(s) Oral <User Schedule>  dextrose 5% + lactated ringers. 1000 milliLiter(s) (30 mL/Hr) IV Continuous <Continuous>  enoxaparin Injectable 40 milliGRAM(s) SubCutaneous daily  erythromycin    ethylsuccinate Suspension 40 mG/mL 500 milliGRAM(s) Oral every 8 hours  insulin lispro (HumaLOG) corrective regimen sliding scale   SubCutaneous every 6 hours  lidocaine   Patch 1 Patch Transdermal every 24 hours  methylPREDNISolone sodium succinate Injectable 8 milliGRAM(s) IV Push every 24 hours  metroNIDAZOLE  IVPB      metroNIDAZOLE  IVPB 500 milliGRAM(s) IV Intermittent every 8 hours  mirtazapine 45 milliGRAM(s) Oral <User Schedule>  pantoprazole  Injectable 40 milliGRAM(s) IV Push every 24 hours  QUEtiapine 50 milliGRAM(s) Oral <User Schedule>  sertraline 50 milliGRAM(s) Oral daily  vancomycin  IVPB 500 milliGRAM(s) IV Intermittent every 24 hours      MEDICATIONS  (PRN):  nystatin Powder 1 Application(s) Topical two times a day PRN rash/itchiness       ---___---___---___---___---___---     <<<REVIEW OF SYSTEM: >>>    GEN: no fever, no chills, no pain  RESP: no SOB, no cough, no sputum  CVS: no chest pain, no palpitations, no edema  GI: no abdominal pain, no nausea, no vomiting, no constipation, no diarrhea  : no dysurea, no frequency  NEURO: no headache, no dizziness  PSYCH: no depression, not anxious  Derm : no itching, no rash     ---___---___---___---___---___---          <<<  VITAL SIGNS: >>>    T(F): 97.5 (08-31-18 @ 08:00), Max: 98.7 (08-30-18 @ 15:00)  HR: 106 (08-31-18 @ 08:00) (99 - 118)  BP: 130/66 (08-31-18 @ 08:00) (130/66 - 130/66)  RR: 19 (08-31-18 @ 08:00) (10 - 32)  SpO2: 100% (08-31-18 @ 08:00) (80% - 100%)  Wt(kg): --  CAPILLARY BLOOD GLUCOSE      POCT Blood Glucose.: 189 mg/dL (31 Aug 2018 06:00)    I&O's Summary    30 Aug 2018 07:01  -  31 Aug 2018 07:00  --------------------------------------------------------  IN: 1510 mL / OUT: 1910 mL / NET: -400 mL    31 Aug 2018 07:01  -  31 Aug 2018 08:15  --------------------------------------------------------  IN: 80 mL / OUT: 40 mL / NET: 40 mL         ---___---___---___---___---___---                       PHYSICAL EXAM:    GEN: A&O X 2 , NAD , comfortable  HEENT: NCAT, PERRL, MMM, no scleral icterus, hearing intact  NECK: Supple, No JVD  CVS: S1S2 , regular , No M/R/G appreciated   PULM: CTA B/L,  no W/R/R appreciated  ABD.: soft. non tender, non distended,  bowel sounds present peg noted   Extrem: intact pulses , no edema noted  Derm: No rash or ecchymosis noted  PSYCH: normal mood, no depression, not anxious     ---___---___---___---___---___---     <<<  LAB AND IMAGING: >>>                          8.4    9.2   )-----------( 260      ( 31 Aug 2018 02:34 )             26.2               08-31    138  |  107  |  18  ----------------------------<  132<H>  3.8   |  19<L>  |  0.98    Ca    8.2<L>      31 Aug 2018 02:34  Phos  4.4     08-31  Mg     1.9     08-31    TPro  4.9<L>  /  Alb  1.8<L>  /  TBili  0.3  /  DBili  x   /  AST  16  /  ALT  10  /  AlkPhos  135<H>  08-31    PT/INR - ( 31 Aug 2018 02:34 )   PT: 14.5 sec;   INR: 1.32 ratio         PTT - ( 31 Aug 2018 02:34 )  PTT:35.7 sec                   ABG - ( 30 Aug 2018 03:38 )  pH, Arterial: 7.45  pH, Blood: x     /  pCO2: 32    /  pO2: 94    / HCO3: 22    / Base Excess: -1.1  /  SaO2: 98                      [All pertinent / recent available Imaging reports and other labs reviewed]     ---___---___---___---___---___---___ ---___---___---___---___---           <<<  A S S E S S M E N T   A N D   P L A N :  >>>          -GI/DVT Prophylaxis.    --------------------------------------------  Case discussed with   Education given on     >>______________________<<      Deniz Bolden .         phone   [phone]

## 2018-08-31 NOTE — PROGRESS NOTE ADULT - ASSESSMENT
67 yo female with dementia, history of UC, and s/p R Hip hemiarthroplasty 6/22/18, post op hematoma  Admitted after left hip fx, noted severe sepsis, MRSA bacteremia, infected R hip- s/p GABRIEL and spacer  Stormy post op course with subsequent episode of sepsis- unclear source, resolved   S/p PEG and febrile again, transient low bp with no source apparent again.   Covered with Cefepime and Flagyl, along with same Vanco  Remains afebrile, surveillance Bld Cxs negative, CT ABD negative, CXR no consolidation, WBC lower    Plan:  Continue vanco, cefepime and flagyl for now  f/u cultures  Follow temps and CBC/diff, monitor Vanco trend  D/w family at bedside 69 yo female with dementia, history of UC, and s/p R Hip hemiarthroplasty 6/22/18, post op hematoma  Admitted after left hip fx, noted severe sepsis, MRSA bacteremia, infected R hip- s/p GABRIEL and spacer  Stormy post op course with subsequent episode of sepsis- unclear source, resolved   S/p PEG and febrile again, transient low bp with no source apparent again.   Covered with Cefepime and Flagyl, along with same Vanco  Remains afebrile, surveillance Bld Cxs negative, CT ABD negative, CXR no consolidation, WBC lower    Plan:  Continue vanco, cefepime and flagyl for now  f/u cultures  Follow temps and CBC/diff, monitor Vanco trend  D/w ICU RN

## 2018-08-31 NOTE — PROGRESS NOTE ADULT - SUBJECTIVE AND OBJECTIVE BOX
HSTORY   68y Female with history of asthma, CVA, pulmonary HTN s/p hip fx sec to osteoporosis due to ?steroid use s/p repair with subsequent surgical site infection and removal of hardware (7/27) with placement of antibiotic spacer device and right femur ORIF (8/4). The pt had persistent bacteremia with MRSA and was treated with Daptomycin and Ceftaroline followed by Vancomycin and meropenem. A PICC line was placed 8/22 for long term IV access recently which was changed 8/24 secondary to tip not in optimal position. She was also pegged on 8/24. She developed fevers on the floor and a CT A/P was performed 8/27 which was neg for acute path and PEG was found to be in place. She returned to SICU 8/27 after she was found to be hypotensive refractory to fluid challenge needs vasopressor support to maintain her hemodynamics. Panculture has been dose and started on broad spectrum antibiotics.    24 HOUR EVENTS: TF held for high residuals. Patient weaned from HFNC to NC. Pt acutely retaining urine overnight (50 cc UOP w/ 400 bladder scan), gavin replaced.     SUBJECTIVE/ROS:  [ ] A ten-point review of systems was otherwise negative except as noted.  [ ] Due to altered mental status/intubation, subjective information were not able to be obtained from the patient. History was obtained, to the extent possible, from review of the chart and collateral sources of information.    NEURO  RASS:     GCS:     [facility] ICU:  Exam:   Meds: clonazePAM Tablet 2 milliGRAM(s) Oral <User Schedule>  mirtazapine 45 milliGRAM(s) Oral <User Schedule>  QUEtiapine 50 milliGRAM(s) Oral <User Schedule>  sertraline 50 milliGRAM(s) Oral daily    [x] Adequacy of sedation and pain control has been assessed and adjusted    RESPIRATORY  RR: 23 (08-31-18 @ 04:00) (1 - 32)  SpO2: 91% (08-31-18 @ 04:00) (91% - 100%)  Wt(kg): --  Exam: unlabored, clear to auscultation bilaterally  Mechanical Ventilation:   ABG - ( 30 Aug 2018 03:38 )  pH: 7.45  /  pCO2: 32    /  pO2: 94    / HCO3: 22    / Base Excess: -1.1  /  SaO2: 98      Lactate: x        [ ] Extubation Readiness Assessed  Meds: ALBUTerol/ipratropium for Nebulization 3 milliLiter(s) Nebulizer every 6 hours  buDESOnide   0.5 milliGRAM(s) Respule 0.5 milliGRAM(s) Inhalation every 12 hours    CARDIOVASCULAR  HR: 113 (08-31-18 @ 04:00) (99 - 118)  BP: --  BP(mean): --  ABP: 130/58 (08-31-18 @ 04:00) (118/50 - 156/75)  ABP(mean): 89 (08-31-18 @ 04:00) (79 - 109)  Wt(kg): --  CVP(cm H2O): --    Exam:  Cardiac Rhythm: NS  Perfusion     [ ]Adequate   [ ]Inadequate  Mentation   [ ]Normal       [ ]Reduced  Extremities  [ ]Warm         [ ]Cool  Volume Status [ ]Hypervolemic [ ]Euvolemic [ ]Hypovolemic  Meds:     GI/NUTRITION  Exam: soft nt nd, PEG in place  Diet: NPO  Meds: pantoprazole  Injectable 40 milliGRAM(s) IV Push every 24 hours      GENITOURINARY  I&O's Detail    08-29 @ 07:01  -  08-30 @ 07:00  --------------------------------------------------------  IN:    dextrose 5% + lactated ringers.: 2400 mL    Enteral Tube Flush: 40 mL    IV PiggyBack: 100 mL    Solution: 200 mL  Total IN: 2740 mL    OUT:    Indwelling Catheter - Urethral: 350 mL    PEG (Percutaneous Endoscopic Gastrostomy) Tube: 700 mL    Voided: 600 mL  Total OUT: 1650 mL    Total NET: 1090 mL      08-30 @ 07:01  -  08-31 @ 04:45  --------------------------------------------------------  IN:    dextrose 5% + lactated ringers.: 200 mL    dextrose 5% + lactated ringers.: 570 mL    IV PiggyBack: 100 mL    Solution: 250 mL  Total IN: 1120 mL    OUT:    PEG (Percutaneous Endoscopic Gastrostomy) Tube: 250 mL    Voided: 950 mL  Total OUT: 1200 mL    Total NET: -80 mL    08-31    138  |  107  |  18  ----------------------------<  132<H>  3.8   |  19<L>  |  0.98    Ca    8.2<L>      31 Aug 2018 02:34  Phos  4.4     08-31  Mg     1.9     08-31    TPro  4.9<L>  /  Alb  1.8<L>  /  TBili  0.3  /  DBili  x   /  AST  16  /  ALT  10  /  AlkPhos  135<H>  08-31    [ ] Gavin catheter, indication: N/A  Meds: dextrose 5% + lactated ringers. 1000 milliLiter(s) IV Continuous <Continuous>    HEMATOLOGIC  Meds: enoxaparin Injectable 40 milliGRAM(s) SubCutaneous daily    [x] VTE Prophylaxis                        8.4    9.2   )-----------( 260      ( 31 Aug 2018 02:34 )             26.2     PT/INR - ( 31 Aug 2018 02:34 )   PT: 14.5 sec;   INR: 1.32 ratio         PTT - ( 31 Aug 2018 02:34 )  PTT:35.7 sec  Transfusion     [ ] PRBC   [ ] Platelets   [ ] FFP   [ ] Cryoprecipitate    INFECTIOUS DISEASES  T(C): 36.5 (08-31-18 @ 03:00), Max: 37.1 (08-30-18 @ 15:00)  Wt(kg): --  WBC Count: 9.2 K/uL (08-31 @ 02:34)    Recent Cultures:  Specimen Source: .Blood Blood-Peripheral, 08-28 @ 05:35; Results   No growth to date.; Gram Stain: --; Organism: --  Specimen Source: .Blood Blood, 08-28 @ 02:49; Results   No growth to date.; Gram Stain: --; Organism: --  Specimen Source: .Blood Blood, 08-26 @ 23:03; Results   No growth to date.; Gram Stain: --; Organism: --    Meds: cefepime   IVPB 1000 milliGRAM(s) IV Intermittent every 24 hours  cefepime   IVPB      erythromycin    ethylsuccinate Suspension 40 mG/mL 500 milliGRAM(s) Oral every 8 hours  metroNIDAZOLE  IVPB      metroNIDAZOLE  IVPB 500 milliGRAM(s) IV Intermittent every 8 hours  vancomycin  IVPB 500 milliGRAM(s) IV Intermittent every 24 hours    ENDOCRINE  Capillary Blood Glucose    Meds: insulin lispro (HumaLOG) corrective regimen sliding scale   SubCutaneous every 6 hours  methylPREDNISolone sodium succinate Injectable 8 milliGRAM(s) IV Push every 24 hours    ACCESS DEVICES:  [ ] Peripheral IV  [ ] Central Venous Line	[ ] R	[ ] L	[ ] IJ	[ ] Fem	[ ] SC	Placed:   [ ] Arterial Line		[ ] R	[ ] L	[ ] Fem	[ ] Rad	[ ] Ax	Placed:   [ ] PICC:					[ ] Mediport  [ ] Urinary Catheter, Date Placed:   [ ] Necessity of urinary, arterial, and venous catheters discussed    OTHER MEDICATIONS:  chlorhexidine 4% Liquid 1 Application(s) Topical <User Schedule>  lidocaine   Patch 1 Patch Transdermal every 24 hours  nystatin Powder 1 Application(s) Topical two times a day PRN    CODE STATUS:

## 2018-09-01 LAB
ANION GAP SERPL CALC-SCNC: 10 MMOL/L — SIGNIFICANT CHANGE UP (ref 5–17)
APTT BLD: 33.7 SEC — SIGNIFICANT CHANGE UP (ref 27.5–37.4)
BUN SERPL-MCNC: 19 MG/DL — SIGNIFICANT CHANGE UP (ref 7–23)
CA-I BLD-SCNC: 1.19 MMOL/L — SIGNIFICANT CHANGE UP (ref 1.12–1.3)
CALCIUM SERPL-MCNC: 8 MG/DL — LOW (ref 8.4–10.5)
CHLORIDE SERPL-SCNC: 107 MMOL/L — SIGNIFICANT CHANGE UP (ref 96–108)
CO2 SERPL-SCNC: 22 MMOL/L — SIGNIFICANT CHANGE UP (ref 22–31)
CREAT SERPL-MCNC: 0.92 MG/DL — SIGNIFICANT CHANGE UP (ref 0.5–1.3)
CULTURE RESULTS: SIGNIFICANT CHANGE UP
GLUCOSE SERPL-MCNC: 113 MG/DL — HIGH (ref 70–99)
HCT VFR BLD CALC: 24.7 % — LOW (ref 34.5–45)
HGB BLD-MCNC: 8.2 G/DL — LOW (ref 11.5–15.5)
INR BLD: 1.24 RATIO — HIGH (ref 0.88–1.16)
MAGNESIUM SERPL-MCNC: 2 MG/DL — SIGNIFICANT CHANGE UP (ref 1.6–2.6)
MCHC RBC-ENTMCNC: 29.7 PG — SIGNIFICANT CHANGE UP (ref 27–34)
MCHC RBC-ENTMCNC: 32.9 GM/DL — SIGNIFICANT CHANGE UP (ref 32–36)
MCV RBC AUTO: 90 FL — SIGNIFICANT CHANGE UP (ref 80–100)
PHOSPHATE SERPL-MCNC: 3.8 MG/DL — SIGNIFICANT CHANGE UP (ref 2.5–4.5)
PLATELET # BLD AUTO: 295 K/UL — SIGNIFICANT CHANGE UP (ref 150–400)
POTASSIUM SERPL-MCNC: 3.8 MMOL/L — SIGNIFICANT CHANGE UP (ref 3.5–5.3)
POTASSIUM SERPL-SCNC: 3.8 MMOL/L — SIGNIFICANT CHANGE UP (ref 3.5–5.3)
PROTHROM AB SERPL-ACNC: 13.5 SEC — HIGH (ref 9.8–12.7)
RBC # BLD: 2.75 M/UL — LOW (ref 3.8–5.2)
RBC # FLD: 13.8 % — SIGNIFICANT CHANGE UP (ref 10.3–14.5)
SODIUM SERPL-SCNC: 139 MMOL/L — SIGNIFICANT CHANGE UP (ref 135–145)
SPECIMEN SOURCE: SIGNIFICANT CHANGE UP
WBC # BLD: 8.7 K/UL — SIGNIFICANT CHANGE UP (ref 3.8–10.5)
WBC # FLD AUTO: 8.7 K/UL — SIGNIFICANT CHANGE UP (ref 3.8–10.5)

## 2018-09-01 PROCEDURE — 71045 X-RAY EXAM CHEST 1 VIEW: CPT | Mod: 26

## 2018-09-01 PROCEDURE — 99232 SBSQ HOSP IP/OBS MODERATE 35: CPT | Mod: GC

## 2018-09-01 RX ORDER — SODIUM CHLORIDE 9 MG/ML
1000 INJECTION, SOLUTION INTRAVENOUS
Qty: 0 | Refills: 0 | Status: DISCONTINUED | OUTPATIENT
Start: 2018-09-01 | End: 2018-09-02

## 2018-09-01 RX ORDER — ACETAMINOPHEN 500 MG
1000 TABLET ORAL ONCE
Qty: 0 | Refills: 0 | Status: COMPLETED | OUTPATIENT
Start: 2018-09-01 | End: 2018-09-01

## 2018-09-01 RX ORDER — ONDANSETRON 8 MG/1
4 TABLET, FILM COATED ORAL ONCE
Qty: 0 | Refills: 0 | Status: COMPLETED | OUTPATIENT
Start: 2018-09-01 | End: 2018-09-02

## 2018-09-01 RX ORDER — ACETAMINOPHEN 500 MG
1000 TABLET ORAL ONCE
Qty: 0 | Refills: 0 | Status: DISCONTINUED | OUTPATIENT
Start: 2018-09-01 | End: 2018-09-04

## 2018-09-01 RX ORDER — POTASSIUM CHLORIDE 20 MEQ
10 PACKET (EA) ORAL
Qty: 0 | Refills: 0 | Status: COMPLETED | OUTPATIENT
Start: 2018-09-01 | End: 2018-09-01

## 2018-09-01 RX ORDER — NYSTATIN 500MM UNIT
500000 POWDER (EA) MISCELLANEOUS
Qty: 0 | Refills: 0 | Status: DISCONTINUED | OUTPATIENT
Start: 2018-09-01 | End: 2018-09-10

## 2018-09-01 RX ADMIN — Medication 3 MILLILITER(S): at 00:17

## 2018-09-01 RX ADMIN — Medication 500000 UNIT(S): at 19:03

## 2018-09-01 RX ADMIN — Medication 100 MILLIEQUIVALENT(S): at 09:14

## 2018-09-01 RX ADMIN — CEFEPIME 100 MILLIGRAM(S): 1 INJECTION, POWDER, FOR SOLUTION INTRAMUSCULAR; INTRAVENOUS at 01:16

## 2018-09-01 RX ADMIN — ENOXAPARIN SODIUM 40 MILLIGRAM(S): 100 INJECTION SUBCUTANEOUS at 11:14

## 2018-09-01 RX ADMIN — Medication 100 MILLIGRAM(S): at 13:01

## 2018-09-01 RX ADMIN — Medication 8 MILLIGRAM(S): at 01:11

## 2018-09-01 RX ADMIN — Medication 400 MILLIGRAM(S): at 20:22

## 2018-09-01 RX ADMIN — Medication 100 MILLIGRAM(S): at 05:40

## 2018-09-01 RX ADMIN — Medication 0.5 MILLIGRAM(S): at 11:19

## 2018-09-01 RX ADMIN — Medication 2 MILLIGRAM(S): at 23:31

## 2018-09-01 RX ADMIN — Medication 3 MILLILITER(S): at 23:33

## 2018-09-01 RX ADMIN — SODIUM CHLORIDE 10 MILLILITER(S): 9 INJECTION, SOLUTION INTRAVENOUS at 19:03

## 2018-09-01 RX ADMIN — Medication 3 MILLILITER(S): at 11:19

## 2018-09-01 RX ADMIN — Medication 1000 MILLIGRAM(S): at 22:35

## 2018-09-01 RX ADMIN — MIRTAZAPINE 45 MILLIGRAM(S): 45 TABLET, ORALLY DISINTEGRATING ORAL at 23:31

## 2018-09-01 RX ADMIN — Medication 0.5 MILLIGRAM(S): at 00:17

## 2018-09-01 RX ADMIN — CHLORHEXIDINE GLUCONATE 1 APPLICATION(S): 213 SOLUTION TOPICAL at 05:41

## 2018-09-01 RX ADMIN — SERTRALINE 50 MILLIGRAM(S): 25 TABLET, FILM COATED ORAL at 11:16

## 2018-09-01 RX ADMIN — Medication 100 MILLIEQUIVALENT(S): at 07:00

## 2018-09-01 RX ADMIN — LIDOCAINE 1 PATCH: 4 CREAM TOPICAL at 09:15

## 2018-09-01 RX ADMIN — SODIUM CHLORIDE 30 MILLILITER(S): 9 INJECTION, SOLUTION INTRAVENOUS at 05:40

## 2018-09-01 RX ADMIN — LIDOCAINE 1 PATCH: 4 CREAM TOPICAL at 23:34

## 2018-09-01 RX ADMIN — PANTOPRAZOLE SODIUM 40 MILLIGRAM(S): 20 TABLET, DELAYED RELEASE ORAL at 11:18

## 2018-09-01 RX ADMIN — Medication 0.5 MILLIGRAM(S): at 23:32

## 2018-09-01 RX ADMIN — Medication 500 MILLIGRAM(S): at 09:15

## 2018-09-01 RX ADMIN — QUETIAPINE FUMARATE 50 MILLIGRAM(S): 200 TABLET, FILM COATED ORAL at 23:31

## 2018-09-01 RX ADMIN — Medication 500000 UNIT(S): at 23:34

## 2018-09-01 RX ADMIN — Medication 100 MILLIGRAM(S): at 23:30

## 2018-09-01 RX ADMIN — Medication 3 MILLILITER(S): at 05:51

## 2018-09-01 RX ADMIN — Medication 500 MILLIGRAM(S): at 02:30

## 2018-09-01 RX ADMIN — Medication 500 MILLIGRAM(S): at 19:03

## 2018-09-01 NOTE — PROGRESS NOTE ADULT - SUBJECTIVE AND OBJECTIVE BOX
MYRIAM WHITE:[id number],   68yFemale followed for:  ASA; dye contrast (Anaphylaxis)  aspirin (Short breath)  divalproex sodium (Other (Unknown))  Haldol (Other (Unknown))  penicillin (Short breath; Rash)  sulfa drugs (Short breath; Rash)  Xanax (Other (Unknown))    PAST MEDICAL & SURGICAL HISTORY:  CVA (cerebral vascular accident)  Fatty pancreas  PNA (pneumonia)  Pulmonary HTN  IGT (impaired glucose tolerance)  Ulcerative colitis  Acid reflux  Anxiety  Depression  Mouth sores  HLD (hyperlipidemia)  Asthma  Humeral head fracture  H/O: hysterectomy  H/O cataract extraction, left  History of knee replacement    FAMILY HISTORY:  Family history of dementia (Grandparent)  Family history of colon cancer (Grandparent)    MEDICATIONS  (STANDING):  ALBUTerol/ipratropium for Nebulization 3 milliLiter(s) Nebulizer every 6 hours  buDESOnide   0.5 milliGRAM(s) Respule 0.5 milliGRAM(s) Inhalation every 12 hours  cefepime   IVPB 1000 milliGRAM(s) IV Intermittent every 24 hours  cefepime   IVPB      chlorhexidine 4% Liquid 1 Application(s) Topical <User Schedule>  clonazePAM Tablet 2 milliGRAM(s) Oral <User Schedule>  dextrose 5% + lactated ringers. 1000 milliLiter(s) (30 mL/Hr) IV Continuous <Continuous>  enoxaparin Injectable 40 milliGRAM(s) SubCutaneous daily  erythromycin    ethylsuccinate Suspension 40 mG/mL 500 milliGRAM(s) Oral every 8 hours  insulin lispro (HumaLOG) corrective regimen sliding scale   SubCutaneous every 6 hours  lidocaine   Patch 1 Patch Transdermal every 24 hours  metroNIDAZOLE  IVPB      metroNIDAZOLE  IVPB 500 milliGRAM(s) IV Intermittent every 8 hours  mirtazapine 45 milliGRAM(s) Oral <User Schedule>  pantoprazole  Injectable 40 milliGRAM(s) IV Push every 24 hours  QUEtiapine 50 milliGRAM(s) Oral <User Schedule>  sertraline 50 milliGRAM(s) Oral daily  vancomycin  IVPB 500 milliGRAM(s) IV Intermittent every 24 hours    MEDICATIONS  (PRN):  nystatin Powder 1 Application(s) Topical two times a day PRN rash/itchiness      Vital Signs Last 24 Hrs  T(C): 36.6 (01 Sep 2018 08:00), Max: 37.1 (31 Aug 2018 16:00)  T(F): 97.9 (01 Sep 2018 08:00), Max: 98.8 (31 Aug 2018 16:00)  HR: 98 (01 Sep 2018 08:00) (97 - 116)  BP: 126/69 (01 Sep 2018 08:00) (104/81 - 138/68)  BP(mean): 91 (01 Sep 2018 08:00) (78 - 96)  RR: 24 (01 Sep 2018 08:00) (20 - 31)  SpO2: 100% (01 Sep 2018 08:00) (93% - 100%)  nc/at  s1s2  cta  soft, nt, nd no guarding or rebound  no c/c/e    CBC Full  -  ( 01 Sep 2018 03:07 )  WBC Count : 8.7 K/uL  Hemoglobin : 8.2 g/dL  Hematocrit : 24.7 %  Platelet Count - Automated : 295 K/uL  Mean Cell Volume : 90.0 fl  Mean Cell Hemoglobin : 29.7 pg  Mean Cell Hemoglobin Concentration : 32.9 gm/dL  Auto Neutrophil # : x  Auto Lymphocyte # : x  Auto Monocyte # : x  Auto Eosinophil # : x  Auto Basophil # : x  Auto Neutrophil % : x  Auto Lymphocyte % : x  Auto Monocyte % : x  Auto Eosinophil % : x  Auto Basophil % : x    09-01    139  |  107  |  19  ----------------------------<  113<H>  3.8   |  22  |  0.92    Ca    8.0<L>      01 Sep 2018 03:07  Phos  3.8     09-01  Mg     2.0     09-01    TPro  4.9<L>  /  Alb  1.8<L>  /  TBili  0.3  /  DBili  x   /  AST  16  /  ALT  10  /  AlkPhos  135<H>  08-31    PT/INR - ( 01 Sep 2018 03:07 )   PT: 13.5 sec;   INR: 1.24 ratio         PTT - ( 01 Sep 2018 03:07 )  PTT:33.7 sec

## 2018-09-01 NOTE — PROGRESS NOTE ADULT - ASSESSMENT
MYRIAM HEATH is a 68y Female with history of asthma, CVA, pulmonary HTN presents with septic shock from MRSA bacteremia secondary to infected right hemiarthroplasty hardware s/p right hip I&D, explantation of right hemiarthroplasty, placement of antibiotic spacer, and right femur ORIF. Readmitted to SICU 8/27 with septic shock requiring pressors.    PLAN:  NEURO: h/o anxiety, depression, dementia; lethargy improving  - Monitor mental status.   - Giving home regimen dose of remeron, seroquil, and klonopin at bedtime   - Pain control with Tylenol and Lidoderm patch.    RESPIRATORY: h/o asthma  - Monitor pulse oximeter and ABGs.  - Out of bed to chair, incentive spirometry, and pulmonary toileting to prevent atelectasis.  - Home prednisone 10 mg daily, Pulmicort, and Duoneb for asthma.    CARDIOVASCULAR: Pulmonary HTN   - Monitor vital signs.  - Reassess volume status and administer fluid as necessary    GI/NUTRITION: gastroparesis  - NPO with pivot @ 10 via G-tube  - Protonix for GERD.  - Continue erythromycin     GENITOURINARY/RENAL: CKD stage 2  - Monitor I&Os. Blackman replaced  - Monitor electrolytes and replete as necessary.  - IVF to 30 cc    HEMATOLOGIC: no acute issues  - Lovenox for VTE prophylaxis.    INFECTIOUS DISEASE: MRSA bacteremia from infected right hip, Candiduria, & PNA, resolved, sepsis   - Cefepime, flagyl, and vancomycin for empiric antibiotics  - Follow up cultures    ENDOCRINE: hyperglycemia  - ISS q 6    - Scar OCHOA

## 2018-09-01 NOTE — PROGRESS NOTE ADULT - SUBJECTIVE AND OBJECTIVE BOX
CC: f/u for MRSA bacteremia, infected R hip, resp failure    Patient more alert, responsive, follows commands, afebrile    REVIEW OF SYSTEMS:  All other review of systems negative (Comprehensive ROS)- limited    Antimicrobials:  cefepime   IVPB 1000 milliGRAM(s) IV Intermittent every 24 hours  cefepime   IVPB      erythromycin    ethylsuccinate Suspension 40 mG/mL 500 milliGRAM(s) Oral every 8 hours  metroNIDAZOLE  IVPB      metroNIDAZOLE  IVPB 500 milliGRAM(s) IV Intermittent every 8 hours  vancomycin  IVPB 500 milliGRAM(s) IV Intermittent every 24 hours    Other Medications Reviewed    Vital Signs Last 24 Hrs  T(F): 98.4 (01 Sep 2018 16:00), Max: 98.4 (01 Sep 2018 16:00)  HR: 97 (01 Sep 2018 16:00) (94 - 116)  BP: 135/62 (01 Sep 2018 16:00) (104/81 - 138/68)  BP(mean): 89 (01 Sep 2018 16:00) (78 - 96)  RR: 22 (01 Sep 2018 16:00) (16 - 31)  SpO2: 100% (01 Sep 2018 16:00) (93% - 100%)    PHYSICAL EXAM:  General: no acute distress  Eyes:  anicteric, no conjunctival injection, no discharge  Oropharynx: no lesions or injection 	  Neck: supple, without adenopathy  Lungs: decreased BSs bases  Heart: regular rate and rhythm; no murmur, rubs or gallops  Abdomen: soft, nondistended, nontender, G tube site clean  Skin: no lesions  Extremities: no edema.  R hip wound dressings intact.  L PICC site clean  Neurologic: more alert/awake, moves all extremities    LAB RESULTS:                                 8.2    8.7   )-----------( 295      ( 01 Sep 2018 03:07 )             24.7   09-01    139  |  107  |  19  ----------------------------<  113<H>  3.8   |  22  |  0.92    Ca    8.0<L>      01 Sep 2018 03:07  Phos  3.8     09-01  Mg     2.0     09-01    TPro  4.9<L>  /  Alb  1.8<L>  /  TBili  0.3  /  DBili  x   /  AST  16  /  ALT  10  /  AlkPhos  135<H>  08-31      MICROBIOLOGY:  RECENT CULTURES:  Culture - Blood (08.28.18 @ 05:35)    Specimen Source: .Blood Blood-Peripheral    Culture Results:   No growth to date.    08-26 @ 23:03 .Blood Blood     No growth to date.      RADIOLOGY REVIEWED:  Xray Chest 1 View- PORTABLE-Routine (08.30.18 @ 06:57) >  Left small pleural effusion and/or atelectasis. The right lung is grossly clear given the limitations of the   exam.    CT Abdomen and Pelvis No Cont (08.27.18 @ 09:35) >  No acute intra abdominal pathology or collection.  Left acetabular fracture as at recent prior imaging.

## 2018-09-01 NOTE — PROGRESS NOTE ADULT - ASSESSMENT
68F s/p R hip PJI explant and placement of cement spacer with distal femur ORIF    Appreciate PRS recommendations: Daily wet to dry dressing changes  and packing- clean with betadiene  Pain control  appreciate SICU care  regarding nutritional optimization  NWB right LE  Flat foot weight bearing left LE  PT/OT    Ortho [id number]

## 2018-09-01 NOTE — PROGRESS NOTE ADULT - SUBJECTIVE AND OBJECTIVE BOX
---___---___---___---___---___---___ ---___---___---___---___---___---___---___---___---___---                    <<<  M E D I C A L   A T T E N D I N G    F O L L O W    U P   N O T E  >>>    patient now in [address] [place] [address] and downgraded from sicu    ---___---___---___---___---___---      <<<  MEDICATIONS:  >>>    MEDICATIONS  (STANDING):  ALBUTerol/ipratropium for Nebulization 3 milliLiter(s) Nebulizer every 6 hours  buDESOnide   0.5 milliGRAM(s) Respule 0.5 milliGRAM(s) Inhalation every 12 hours  cefepime   IVPB 1000 milliGRAM(s) IV Intermittent every 24 hours  cefepime   IVPB      chlorhexidine 4% Liquid 1 Application(s) Topical <User Schedule>  clonazePAM Tablet 2 milliGRAM(s) Oral <User Schedule>  dextrose 5% + lactated ringers. 1000 milliLiter(s) (10 mL/Hr) IV Continuous <Continuous>  enoxaparin Injectable 40 milliGRAM(s) SubCutaneous daily  erythromycin    ethylsuccinate Suspension 40 mG/mL 500 milliGRAM(s) Oral every 8 hours  insulin lispro (HumaLOG) corrective regimen sliding scale   SubCutaneous every 6 hours  lidocaine   Patch 1 Patch Transdermal every 24 hours  metroNIDAZOLE  IVPB      metroNIDAZOLE  IVPB 500 milliGRAM(s) IV Intermittent every 8 hours  mirtazapine 45 milliGRAM(s) Oral <User Schedule>  nystatin    Suspension [id number] Unit(s) Oral four times a day  ondansetron Injectable 4 milliGRAM(s) IV Push once  pantoprazole  Injectable 40 milliGRAM(s) IV Push every 24 hours  QUEtiapine 50 milliGRAM(s) Oral <User Schedule>  sertraline 50 milliGRAM(s) Oral daily  vancomycin  IVPB 500 milliGRAM(s) IV Intermittent every 24 hours      MEDICATIONS  (PRN):  nystatin Powder 1 Application(s) Topical two times a day PRN rash/itchiness       ---___---___---___---___---___---     <<<REVIEW OF SYSTEM: >>>    GEN: no fever, no chills, no pain  RESP: no SOB, no cough, no sputum  CVS: no chest pain, no palpitations, no edema  GI: no abdominal pain, no nausea, no vomiting, no constipation, no diarrhea  : no dysurea, no frequency  NEURO: no headache, no dizziness  PSYCH: no depression, not anxious  Derm : no itching, no rash     ---___---___---___---___---___---          <<<  VITAL SIGNS: >>>    T(F): 97.6 (09-01-18 @ 17:35), Max: 98.4 (09-01-18 @ 16:00)  HR: 99 (09-01-18 @ 17:35) (94 - 116)  BP: 103/66 (09-01-18 @ 17:35) (103/66 - 138/68)  RR: 18 (09-01-18 @ 17:35) (16 - 31)  SpO2: 97% (09-01-18 @ 17:35) (93% - 100%)  Wt(kg): --  CAPILLARY BLOOD GLUCOSE      POCT Blood Glucose.: 139 mg/dL (01 Sep 2018 11:12)    I&O's Summary    31 Aug 2018 07:01  -  01 Sep 2018 07:00  --------------------------------------------------------  IN: 1190 mL / OUT: 1340 mL / NET: -150 mL    01 Sep 2018 07:01  -  01 Sep 2018 18:42  --------------------------------------------------------  IN: 720 mL / OUT: 755 mL / NET: -35 mL         ---___---___---___---___---___---                       PHYSICAL EXAM:    GEN: A&O X 3 , NAD , comfortable  HEENT: NCAT, PERRL, MMM, no scleral icterus, hearing intact  NECK: Supple, No JVD  CVS: S1S2 , regular , No M/R/G appreciated  PULM: CTA B/L,  no W/R/R appreciated  ABD.: soft. non tender, non distended,  bowel sounds present peg noted   Extrem: intact pulses , no edema noted  Derm: No rash or ecchymosis noted  PSYCH: normal mood, no depression,  anxious     ---___---___---___---___---___---     <<<  LAB AND IMAGING: >>>                          8.2    8.7   )-----------( 295      ( 01 Sep 2018 03:07 )             24.7               09-01    139  |  107  |  19  ----------------------------<  113<H>  3.8   |  22  |  0.92    Ca    8.0<L>      01 Sep 2018 03:07  Phos  3.8     09-01  Mg     2.0     09-01    TPro  4.9<L>  /  Alb  1.8<L>  /  TBili  0.3  /  DBili  x   /  AST  16  /  ALT  10  /  AlkPhos  135<H>  08-31    PT/INR - ( 01 Sep 2018 03:07 )   PT: 13.5 sec;   INR: 1.24 ratio         PTT - ( 01 Sep 2018 03:07 )  PTT:33.7 sec                           [All pertinent / recent available Imaging reports and other labs reviewed]     ---___---___---___---___---___---___ ---___---___---___---___---           <<<  A S S E S S M E N T   A N D   P L A N :  >>>          -GI/DVT Prophylaxis.    --------------------------------------------       >>______________________<<      Deniz Bolden .         phone   [phone]

## 2018-09-01 NOTE — PROGRESS NOTE ADULT - ATTENDING COMMENTS
Dr. Arias (Attending Physician)  Dementia, Depression, Anxiety - c/w her antipsychotics, benzos.  MRSA hardward infection recent severe sepsis will c/w vanc/cefepime/metronidazole.  Asthma start her home prednisone dose orally.  Gastroparesis, c/w erythromycin x 1 day.  Stable for tx back to floor.

## 2018-09-01 NOTE — PROGRESS NOTE ADULT - SUBJECTIVE AND OBJECTIVE BOX
HISTORY  68y Female with history of asthma, CVA, pulmonary HTN s/p hip fx sec to osteoporosis due to ?steroid use s/p repair with subsequent surgical site infection and removal of hardware (7/27) with placement of antibiotic spacer device and right femur ORIF (8/4). The pt had persistent bacteremia with MRSA and was treated with Daptomycin and Ceftaroline followed by Vancomycin and meropenem. A PICC line was placed 8/22 for long term IV access recently which was changed 8/24 secondary to tip not in optimal position. She was also pegged on 8/24. She developed fevers on the floor and a CT A/P was performed 8/27 which was neg for acute path and PEG was found to be in place. She returned to SICU 8/27 after she was found to be hypotensive refractory to fluid challenge needs vasopressor support to maintain her hemodynamics. Panculture has been dose and started on broad spectrum antibiotics.      24 HOUR EVENTS: Trickle feeds started at 10ml/hr  as well as erythromycin for gastric motility. A-line discontinued. No acute events overnight.     SUBJECTIVE/ROS:  [ ] A ten-point review of systems was otherwise negative except as noted.  [ ] Due to altered mental status/intubation, subjective information were not able to be obtained from the patient. History was obtained, to the extent possible, from review of the chart and collateral sources of information.      NEURO  Exam: awake, alert, oriented x1  Meds: clonazePAM Tablet 2 milliGRAM(s) Oral <User Schedule>  mirtazapine 45 milliGRAM(s) Oral <User Schedule>  QUEtiapine 50 milliGRAM(s) Oral <User Schedule>  sertraline 50 milliGRAM(s) Oral daily    [x] Adequacy of sedation and pain control has been assessed and adjusted      RESPIRATORY  RR: 24 (09-01-18 @ 03:00) (19 - 31)  SpO2: 98% (09-01-18 @ 03:00) (80% - 100%)  Exam: unlabored, clear to auscultation bilaterally  Mechanical Ventilation: none  [N/A] Extubation Readiness Assessed  Meds: ALBUTerol/ipratropium for Nebulization 3 milliLiter(s) Nebulizer every 6 hours  buDESOnide   0.5 milliGRAM(s) Respule 0.5 milliGRAM(s) Inhalation every 12 hours        CARDIOVASCULAR  HR: 110 (09-01-18 @ 03:00) (102 - 116)  BP: 134/62 (09-01-18 @ 03:00) (104/81 - 138/68)  BP(mean): 89 (09-01-18 @ 03:00) (78 - 96)  ABP: 142/67 (08-31-18 @ 15:00) (124/59 - 147/73)  ABP(mean): 98 (08-31-18 @ 15:00) (87 - 105)  Exam: regular rate and rhythm  Cardiac Rhythm: sinus  Perfusion     [x]Adequate   [ ]Inadequate  Mentation   [x]Normal       [ ]Reduced  Extremities  [x]Warm         [ ]Cool  Volume Status [ ]Hypervolemic [x]Euvolemic [ ]Hypovolemic  Meds: none      GI/NUTRITION  Exam: soft, nontender, nondistended, incision C/D/I  Diet: NPO with pivot @ 10 via PEG tube   Meds: pantoprazole  Injectable 40 milliGRAM(s) IV Push every 24 hours      GENITOURINARY  I&O's Detail    08-30 @ 07:01  -  08-31 @ 07:00  --------------------------------------------------------  IN:    dextrose 5% + lactated ringers.: 200 mL    dextrose 5% + lactated ringers.: 660 mL    Enteral Tube Flush: 100 mL    IV PiggyBack: 200 mL    Solution: 350 mL  Total IN: 1510 mL    OUT:    Indwelling Catheter - Urethral: 560 mL    PEG (Percutaneous Endoscopic Gastrostomy) Tube: 400 mL    Voided: 950 mL  Total OUT: 1910 mL    Total NET: -400 mL      08-31 @ 07:01  -  09-01 @ 04:07  --------------------------------------------------------  IN:    dextrose 5% + lactated ringers.: 600 mL    Enteral Tube Flush: 150 mL    IV PiggyBack: 50 mL    Pivot: 130 mL  Total IN: 930 mL    OUT:    Indwelling Catheter - Urethral: 1145 mL  Total OUT: 1145 mL    Total NET: -215 mL          09-01    139  |  107  |  19  ----------------------------<  113<H>  3.8   |  22  |  0.92    Ca    8.0<L>      01 Sep 2018 03:07  Phos  3.8     09-01  Mg     2.0     09-01    TPro  4.9<L>  /  Alb  1.8<L>  /  TBili  0.3  /  DBili  x   /  AST  16  /  ALT  10  /  AlkPhos  135<H>  08-31    [x] Blackman catheter, indication: N/A  Meds: dextrose 5% + lactated ringers. 1000 milliLiter(s) IV Continuous <Continuous>        HEMATOLOGIC  Meds: enoxaparin Injectable 40 milliGRAM(s) SubCutaneous daily    [x] VTE Prophylaxis                        8.2    8.7   )-----------( 295      ( 01 Sep 2018 03:07 )             24.7     PT/INR - ( 01 Sep 2018 03:07 )   PT: 13.5 sec;   INR: 1.24 ratio         PTT - ( 01 Sep 2018 03:07 )  PTT:33.7 sec  Transfusion     [ ] PRBC   [ ] Platelets   [ ] FFP   [ ] Cryoprecipitate      INFECTIOUS DISEASES  WBC Count: 8.7 K/uL (09-01 @ 03:07)    RECENT CULTURES:  Specimen Source: .Blood Blood-Peripheral  Date/Time: 08-28 @ 05:35  Culture Results:   No growth to date.  Gram Stain: --  Organism: --  Specimen Source: .Blood Blood  Date/Time: 08-28 @ 02:49  Culture Results:   No growth to date.  Gram Stain: --  Organism: --    Meds: cefepime   IVPB 1000 milliGRAM(s) IV Intermittent every 24 hours  cefepime   IVPB      erythromycin    ethylsuccinate Suspension 40 mG/mL 500 milliGRAM(s) Oral every 8 hours  metroNIDAZOLE  IVPB      metroNIDAZOLE  IVPB 500 milliGRAM(s) IV Intermittent every 8 hours  vancomycin  IVPB 500 milliGRAM(s) IV Intermittent every 24 hours        ENDOCRINE  CAPILLARY BLOOD GLUCOSE      POCT Blood Glucose.: 112 mg/dL (31 Aug 2018 17:50)  POCT Blood Glucose.: 170 mg/dL (31 Aug 2018 11:15)  POCT Blood Glucose.: 189 mg/dL (31 Aug 2018 06:00)    Meds: insulin lispro (HumaLOG) corrective regimen sliding scale   SubCutaneous every 6 hours  methylPREDNISolone sodium succinate Injectable 8 milliGRAM(s) IV Push every 24 hours        ACCESS DEVICES:  [x] Peripheral IV  [ ] Central Venous Line	[ ] R	[ ] L	[ ] IJ	[ ] Fem	[ ] SC	Placed:   [ ] Arterial Line		[ ] R	[ ] L	[ ] Fem	[ ] Rad	[ ] Ax	Placed:   [x ] PICC:					[ ] Mediport  [x ] Urinary Catheter, Date Placed:   [x] Necessity of urinary, arterial, and venous catheters discussed    OTHER MEDICATIONS:  chlorhexidine 4% Liquid 1 Application(s) Topical <User Schedule>  lidocaine   Patch 1 Patch Transdermal every 24 hours  nystatin Powder 1 Application(s) Topical two times a day PRN      CODE STATUS: full code      IMAGING: < from: CT Abdomen and Pelvis No Cont (08.27.18 @ 09:35) >    IMPRESSION:     No acute intra abdominal pathology or collection.    Left acetabular fracture as at recent prior imaging.                  < end of copied text >

## 2018-09-01 NOTE — PROGRESS NOTE ADULT - SUBJECTIVE AND OBJECTIVE BOX
Ortho Progress Note    S: Patient seen and examined. No acute events overnight. Pain well controlled with current regimen. Denies lightheadedness/dizziness, CP/SOB.       O:  Physical Exam:  Gen: Laying in bed, NAD, alert   Resp: Unlabored breathing  Ext: EHL/FHL/TA/Sol intact          + SILT DP/SP/REED/Sa          +DP, extremity WWP  distal aspect of the incision open about 5cm, packed with wet meaghan. incision betadine painted, and wet to dry dressing placed            Vital Signs Last 24 Hrs  T(C): 36.6 (01 Sep 2018 08:00), Max: 37.1 (31 Aug 2018 16:00)  T(F): 97.9 (01 Sep 2018 08:00), Max: 98.8 (31 Aug 2018 16:00)  HR: 98 (01 Sep 2018 08:00) (97 - 116)  BP: 126/69 (01 Sep 2018 08:00) (104/81 - 138/68)  BP(mean): 91 (01 Sep 2018 08:00) (78 - 96)  RR: 24 (01 Sep 2018 08:00) (20 - 31)  SpO2: 100% (01 Sep 2018 08:00) (93% - 100%)

## 2018-09-01 NOTE — PROGRESS NOTE ADULT - ASSESSMENT
69 yo female with dementia, history of UC, and s/p R Hip hemiarthroplasty 6/22/18, post op hematoma  Admitted after left hip fx, noted severe sepsis, MRSA bacteremia, infected R hip- s/p GABRIEL and spacer  Stormy post op course with subsequent episode of sepsis- unclear source, resolved   S/p PEG and febrile again, transient low bp with no source apparent, again.   Covered with Cefepime and Flagyl, along with same Vanco  Remains afebrile, surveillance Bld Cxs negative, CT ABD negative, CXR no consolidation, WBC normal  More alert/awake    Plan:  Continue vanco (Day 28 of 42), cefepime and flagyl (Day 5 of 7) for now  Follow temps and CBC/diff  Monitor Vanco trough  D/w daughter at bedside

## 2018-09-02 LAB
ANION GAP SERPL CALC-SCNC: 10 MMOL/L — SIGNIFICANT CHANGE UP (ref 5–17)
APTT BLD: 35.4 SEC — SIGNIFICANT CHANGE UP (ref 27.5–37.4)
BUN SERPL-MCNC: 18 MG/DL — SIGNIFICANT CHANGE UP (ref 7–23)
CALCIUM SERPL-MCNC: 8.3 MG/DL — LOW (ref 8.4–10.5)
CHLORIDE SERPL-SCNC: 105 MMOL/L — SIGNIFICANT CHANGE UP (ref 96–108)
CO2 SERPL-SCNC: 21 MMOL/L — LOW (ref 22–31)
CREAT SERPL-MCNC: 0.86 MG/DL — SIGNIFICANT CHANGE UP (ref 0.5–1.3)
CULTURE RESULTS: SIGNIFICANT CHANGE UP
CULTURE RESULTS: SIGNIFICANT CHANGE UP
GLUCOSE SERPL-MCNC: 161 MG/DL — HIGH (ref 70–99)
HCT VFR BLD CALC: 27.9 % — LOW (ref 34.5–45)
HGB BLD-MCNC: 8.8 G/DL — LOW (ref 11.5–15.5)
INR BLD: 1.19 RATIO — HIGH (ref 0.88–1.16)
MAGNESIUM SERPL-MCNC: 1.6 MG/DL — SIGNIFICANT CHANGE UP (ref 1.6–2.6)
MCHC RBC-ENTMCNC: 29.5 PG — SIGNIFICANT CHANGE UP (ref 27–34)
MCHC RBC-ENTMCNC: 31.5 GM/DL — LOW (ref 32–36)
MCV RBC AUTO: 93.6 FL — SIGNIFICANT CHANGE UP (ref 80–100)
PHOSPHATE SERPL-MCNC: 3.3 MG/DL — SIGNIFICANT CHANGE UP (ref 2.5–4.5)
PLATELET # BLD AUTO: 299 K/UL — SIGNIFICANT CHANGE UP (ref 150–400)
POTASSIUM SERPL-MCNC: 4.4 MMOL/L — SIGNIFICANT CHANGE UP (ref 3.5–5.3)
POTASSIUM SERPL-SCNC: 4.4 MMOL/L — SIGNIFICANT CHANGE UP (ref 3.5–5.3)
PROTHROM AB SERPL-ACNC: 13.5 SEC — HIGH (ref 10–13.1)
RBC # BLD: 2.98 M/UL — LOW (ref 3.8–5.2)
RBC # FLD: 15.5 % — HIGH (ref 10.3–14.5)
SODIUM SERPL-SCNC: 136 MMOL/L — SIGNIFICANT CHANGE UP (ref 135–145)
SPECIMEN SOURCE: SIGNIFICANT CHANGE UP
SPECIMEN SOURCE: SIGNIFICANT CHANGE UP
VANCOMYCIN TROUGH SERPL-MCNC: 15.5 UG/ML — SIGNIFICANT CHANGE UP (ref 10–20)
WBC # BLD: 9.78 K/UL — SIGNIFICANT CHANGE UP (ref 3.8–10.5)
WBC # FLD AUTO: 9.78 K/UL — SIGNIFICANT CHANGE UP (ref 3.8–10.5)

## 2018-09-02 RX ORDER — ACETAMINOPHEN 500 MG
1000 TABLET ORAL ONCE
Qty: 0 | Refills: 0 | Status: COMPLETED | OUTPATIENT
Start: 2018-09-02 | End: 2018-09-02

## 2018-09-02 RX ADMIN — Medication 3 MILLILITER(S): at 12:13

## 2018-09-02 RX ADMIN — Medication 0.5 MILLIGRAM(S): at 12:13

## 2018-09-02 RX ADMIN — Medication 100 MILLIGRAM(S): at 14:27

## 2018-09-02 RX ADMIN — Medication 500 MILLIGRAM(S): at 10:29

## 2018-09-02 RX ADMIN — Medication 500000 UNIT(S): at 06:01

## 2018-09-02 RX ADMIN — QUETIAPINE FUMARATE 50 MILLIGRAM(S): 200 TABLET, FILM COATED ORAL at 21:52

## 2018-09-02 RX ADMIN — Medication 3 MILLILITER(S): at 06:02

## 2018-09-02 RX ADMIN — LIDOCAINE 1 PATCH: 4 CREAM TOPICAL at 10:35

## 2018-09-02 RX ADMIN — CHLORHEXIDINE GLUCONATE 1 APPLICATION(S): 213 SOLUTION TOPICAL at 06:07

## 2018-09-02 RX ADMIN — CEFEPIME 100 MILLIGRAM(S): 1 INJECTION, POWDER, FOR SOLUTION INTRAMUSCULAR; INTRAVENOUS at 01:32

## 2018-09-02 RX ADMIN — Medication 100 MILLIGRAM(S): at 06:01

## 2018-09-02 RX ADMIN — Medication 500000 UNIT(S): at 19:18

## 2018-09-02 RX ADMIN — LIDOCAINE 1 PATCH: 4 CREAM TOPICAL at 21:52

## 2018-09-02 RX ADMIN — Medication 400 MILLIGRAM(S): at 20:40

## 2018-09-02 RX ADMIN — Medication 2: at 12:15

## 2018-09-02 RX ADMIN — Medication 2 MILLIGRAM(S): at 21:50

## 2018-09-02 RX ADMIN — Medication 10 MILLIGRAM(S): at 01:33

## 2018-09-02 RX ADMIN — PANTOPRAZOLE SODIUM 40 MILLIGRAM(S): 20 TABLET, DELAYED RELEASE ORAL at 12:13

## 2018-09-02 RX ADMIN — Medication 500000 UNIT(S): at 12:13

## 2018-09-02 RX ADMIN — Medication 100 MILLIGRAM(S): at 21:50

## 2018-09-02 RX ADMIN — Medication 1000 MILLIGRAM(S): at 21:48

## 2018-09-02 RX ADMIN — Medication 3 MILLILITER(S): at 19:18

## 2018-09-02 RX ADMIN — Medication 2: at 07:11

## 2018-09-02 RX ADMIN — ENOXAPARIN SODIUM 40 MILLIGRAM(S): 100 INJECTION SUBCUTANEOUS at 12:14

## 2018-09-02 RX ADMIN — Medication 500 MILLIGRAM(S): at 01:33

## 2018-09-02 RX ADMIN — Medication 500 MILLIGRAM(S): at 19:18

## 2018-09-02 RX ADMIN — SERTRALINE 50 MILLIGRAM(S): 25 TABLET, FILM COATED ORAL at 12:13

## 2018-09-02 RX ADMIN — ONDANSETRON 4 MILLIGRAM(S): 8 TABLET, FILM COATED ORAL at 20:40

## 2018-09-02 RX ADMIN — Medication 100 MILLIGRAM(S): at 08:49

## 2018-09-02 RX ADMIN — MIRTAZAPINE 45 MILLIGRAM(S): 45 TABLET, ORALLY DISINTEGRATING ORAL at 21:51

## 2018-09-02 NOTE — PROVIDER CONTACT NOTE (OTHER) - ASSESSMENT
PT vital signs stable 108/62  93% O2, but lethargic and difficult to arouse.  pupils equal and reactive,

## 2018-09-02 NOTE — PROGRESS NOTE ADULT - ASSESSMENT
69 yo female with dementia, history of UC, and s/p R Hip hemiarthroplasty 6/22/18, post op hematoma  Admitted after left hip fx, noted severe sepsis, MRSA bacteremia, infected R hip- s/p GABRIEL and spacer  Stormy post op course with subsequent episode of sepsis- unclear source, resolved   S/p PEG and febrile again, transient low bp with no source apparent, again.   Covered with Cefepime and Flagyl, along with same Vanco  Remains afebrile, surveillance Bld Cxs negative, CT ABD negative, CXR no consolidation, WBC normal  Now out of ICU    Plan:  Continue vanco (Day 29 of 42), cefepime and flagyl (Day 6) for now  Follow temps and CBC/diff  Vanco trough adequate  Will trend PCT- hope to limit cefepime and flagyl to another 24 hrs  PA wound assessment noted- distal dehiscence, 5 cm granulation- continue local wound care

## 2018-09-02 NOTE — PROGRESS NOTE ADULT - SUBJECTIVE AND OBJECTIVE BOX
---___---___---___---___---___---___ ---___---___---___---___---___---___---___---___---___---                    <<<  M E D I C A L   A T T E N D I N G    F O L L O W    U P   N O T E  >>>  still with mrsa and also has dehiscence to the wound  otherwise has been unchanged and also has  been having ulcers to the buttocks     ---___---___---___---___---___---      <<<  MEDICATIONS:  >>>    MEDICATIONS  (STANDING):  acetaminophen  IVPB. 1000 milliGRAM(s) IV Intermittent once  ALBUTerol/ipratropium for Nebulization 3 milliLiter(s) Nebulizer every 6 hours  buDESOnide   0.5 milliGRAM(s) Respule 0.5 milliGRAM(s) Inhalation every 12 hours  cefepime   IVPB 1000 milliGRAM(s) IV Intermittent every 24 hours  cefepime   IVPB      chlorhexidine 4% Liquid 1 Application(s) Topical <User Schedule>  clonazePAM Tablet 2 milliGRAM(s) Oral <User Schedule>  dextrose 5% + lactated ringers. 1000 milliLiter(s) (10 mL/Hr) IV Continuous <Continuous>  enoxaparin Injectable 40 milliGRAM(s) SubCutaneous daily  erythromycin    ethylsuccinate Suspension 40 mG/mL 500 milliGRAM(s) Oral every 8 hours  insulin lispro (HumaLOG) corrective regimen sliding scale   SubCutaneous every 6 hours  lidocaine   Patch 1 Patch Transdermal every 24 hours  metroNIDAZOLE  IVPB      metroNIDAZOLE  IVPB 500 milliGRAM(s) IV Intermittent every 8 hours  mirtazapine 45 milliGRAM(s) Oral <User Schedule>  nystatin    Suspension 274913 Unit(s) Oral four times a day  ondansetron Injectable 4 milliGRAM(s) IV Push once  pantoprazole  Injectable 40 milliGRAM(s) IV Push every 24 hours  predniSONE  Solution 10 milliGRAM(s) Enteral Tube every 24 hours  QUEtiapine 50 milliGRAM(s) Oral <User Schedule>  sertraline 50 milliGRAM(s) Oral daily  vancomycin  IVPB 500 milliGRAM(s) IV Intermittent every 24 hours      MEDICATIONS  (PRN):  nystatin Powder 1 Application(s) Topical two times a day PRN rash/itchiness       ---___---___---___---___---___---     <<<REVIEW OF SYSTEM: >>>    GEN: no fever, no chills, no pain  RESP: no SOB, no cough, no sputum  CVS: no chest pain, no palpitations, no edema  GI: no abdominal pain, no nausea, no vomiting, no constipation, no diarrhea  : no dysurea, no frequency  NEURO: no headache, no dizziness  PSYCH: no depression, not anxious  Derm : no itching, no rash     ---___---___---___---___---___---          <<<  VITAL SIGNS: >>>    T(F): 98.3 (09-02-18 @ 04:13), Max: 98.4 (09-01-18 @ 16:00)  HR: 112 (09-02-18 @ 04:13) (94 - 112)  BP: 108/62 (09-02-18 @ 04:13) (103/56 - 135/62)  RR: 18 (09-02-18 @ 04:13) (16 - 26)  SpO2: 93% (09-02-18 @ 04:13) (93% - 100%)  Wt(kg): --  CAPILLARY BLOOD GLUCOSE      POCT Blood Glucose.: 172 mg/dL (02 Sep 2018 06:55)    I&O's Summary    01 Sep 2018 07:01  -  02 Sep 2018 07:00  --------------------------------------------------------  IN: 1260 mL / OUT: 1230 mL / NET: 30 mL    02 Sep 2018 07:01  -  02 Sep 2018 07:57  --------------------------------------------------------  IN: 0 mL / OUT: 500 mL / NET: -500 mL         ---___---___---___---___---___---                       PHYSICAL EXAM:    GEN: A&O X 2 , NAD , comfortable  HEENT: NCAT, PERRL, MMM, no scleral icterus, hearing intact  NECK: Supple, No JVD  CVS: S1S2 , regular , No M/R/G appreciated  PULM: CTA B/L,  no W/R/R appreciated  ABD.: soft. non tender, non distended,  bowel sounds present  Extrem: intact pulses , no edema noted  Derm: wound to the right leg slowly healing and dressing changed   PSYCH: normal mood, no depression, not anxious     ---___---___---___---___---___---     <<<  LAB AND IMAGING: >>>                          8.2    8.7   )-----------( 295      ( 01 Sep 2018 03:07 )             24.7               09-01    139  |  107  |  19  ----------------------------<  113<H>  3.8   |  22  |  0.92    Ca    8.0<L>      01 Sep 2018 03:07  Phos  3.8     09-01  Mg     2.0     09-01      PT/INR - ( 01 Sep 2018 03:07 )   PT: 13.5 sec;   INR: 1.24 ratio         PTT - ( 01 Sep 2018 03:07 )  PTT:33.7 sec                           [All pertinent / recent available Imaging reports and other labs reviewed]     ---___---___---___---___---___---___ ---___---___---___---___---           <<<  A S S E S S M E N T   A N D   P L A N :  >>>          -GI/DVT Prophylaxis.    --------------------------------------------  Case discussed with   Education given on     >>______________________<<      Deniz Bolden .         phone   [phone]

## 2018-09-02 NOTE — PROGRESS NOTE ADULT - SUBJECTIVE AND OBJECTIVE BOX
CC: f/u for MRSA bacteremia, infected R hip, resp failure    Patient somnolent, no distress; now on Ortho service, transferred out of SICU     REVIEW OF SYSTEMS:  All other review of systems negative (Comprehensive ROS)- limited    Antimicrobials:  cefepime   IVPB 1000 milliGRAM(s) IV Intermittent every 24 hours  cefepime   IVPB      erythromycin    ethylsuccinate Suspension 40 mG/mL 500 milliGRAM(s) Oral every 8 hours  metroNIDAZOLE  IVPB      metroNIDAZOLE  IVPB 500 milliGRAM(s) IV Intermittent every 8 hours  vancomycin  IVPB 500 milliGRAM(s) IV Intermittent every 24 hours    Other Medications Reviewed    Vital Signs Last 24 Hrs  T(F): 98.2 (02 Sep 2018 14:00), Max: 98.8 (02 Sep 2018 08:55)  HR: 105 (02 Sep 2018 14:00) (97 - 112)  BP: 127/73 (02 Sep 2018 14:00) (103/56 - 135/62)  BP(mean): 89 (01 Sep 2018 16:00) (89 - 89)  RR: 18 (02 Sep 2018 14:00) (17 - 22)  SpO2: 95% (02 Sep 2018 14:00) (93% - 100%)    PHYSICAL EXAM:  General: no acute distress  Eyes:  anicteric, no conjunctival injection, no discharge  Oropharynx: no lesions or injection 	  Neck: supple, without adenopathy  Lungs: decreased BSs bases  Heart: regular rate and rhythm; no murmur, rubs or gallops  Abdomen: soft, nondistended, nontender, G tube site clean  Skin: no lesions  Extremities: no edema.  R hip wound dressings intact.  L PICC site clean  Neurologic: more alert/awake, moves all extremities    LAB RESULTS:                                8.8    9.78  )-----------( 299      ( 02 Sep 2018 10:09 )             27.9   09-02    136  |  105  |  18  ----------------------------<  161<H>  4.4   |  21<L>  |  0.86    Ca    8.3<L>      02 Sep 2018 07:39  Phos  3.3     09-02  Mg     1.6     09-02    Vancomycin Level, Trough (09.02.18 @ 07:39)    Vancomycin Level, Trough: 15.5    Procalcitonin  08-31-18 @ 02:34   -  27.09      MICROBIOLOGY:  RECENT CULTURES:  Culture - Blood (08.28.18 @ 05:35)    Specimen Source: .Blood Blood-Peripheral    Culture Results:   No growth to date.    08-26 @ 23:03 .Blood Blood     No growth to date.      RADIOLOGY REVIEWED:  < from: Xray Chest 1 View- PORTABLE-Routine (09.01.18 @ 07:29) >  Left effusion and left basilar atelectasis. Right upper lobe patchy and   linear opacities, unchanged.    CT Abdomen and Pelvis No Cont (08.27.18 @ 09:35) >  No acute intra abdominal pathology or collection.  Left acetabular fracture as at recent prior imaging.

## 2018-09-02 NOTE — PROGRESS NOTE ADULT - SUBJECTIVE AND OBJECTIVE BOX
Cathy Lozano PA-C  Orthopedic Surgery  Pagers [phone] Patient resting without complaints.  Denies chest pain, SOB, N/V.    T(C): 37.1 (09-02-18 @ 08:55)  T(F): 98.8 (09-02-18 @ 08:55)  HR: 100-106 (09-02-18 @ 08:55)  BP: 119/70 (09-02-18 @ 08:55)  RR: 17 (09-02-18 @ 08:55)  SpO2: 97% (09-02-18 @ 08:55)      Exam:  Drowsy but arousable. No acute distress    R lower extremity:  Hip dsg changed; distal aspect of incision open w/ about 5cm of granulation tissue; betadine, WTD, ABDs & new dsg applied  Calf soft, non-tender  +PF/DF  Sensation grossly intact  +DP pulse                          8.8    9.78  )-----------( 299      ( 02 Sep 2018 10:09 )             27.9        136  |  105  |  18  ----------------------------<  161<H>  4.4   |  21<L>  |  0.86      A/P: 68y Female s/p R hip PJI explant and placement of cement spacer with distal femur ORIF, POD 30; Stable  -Pain control  -DVT ppx; Encourage IS  -Am labs  -NWB RLE; Flat foot weight bearing LLE  -Continue current tx.    Cathy Lozano PA-C  Orthopedic Surgery  Pagers [phone]

## 2018-09-02 NOTE — PROGRESS NOTE ADULT - SUBJECTIVE AND OBJECTIVE BOX
MYRIAM WHITE:[id number],   68yFemale followed for:  ASA; dye contrast (Anaphylaxis)  aspirin (Short breath)  divalproex sodium (Other (Unknown))  Haldol (Other (Unknown))  penicillin (Short breath; Rash)  sulfa drugs (Short breath; Rash)  Xanax (Other (Unknown))    PAST MEDICAL & SURGICAL HISTORY:  CVA (cerebral vascular accident)  Fatty pancreas  PNA (pneumonia)  Pulmonary HTN  IGT (impaired glucose tolerance)  Ulcerative colitis  Acid reflux  Anxiety  Depression  Mouth sores  HLD (hyperlipidemia)  Asthma  Humeral head fracture  H/O: hysterectomy  H/O cataract extraction, left  History of knee replacement    FAMILY HISTORY:  Family history of dementia (Grandparent)  Family history of colon cancer (Grandparent)    MEDICATIONS  (STANDING):  acetaminophen  IVPB. 1000 milliGRAM(s) IV Intermittent once  ALBUTerol/ipratropium for Nebulization 3 milliLiter(s) Nebulizer every 6 hours  buDESOnide   0.5 milliGRAM(s) Respule 0.5 milliGRAM(s) Inhalation every 12 hours  cefepime   IVPB 1000 milliGRAM(s) IV Intermittent every 24 hours  cefepime   IVPB      chlorhexidine 4% Liquid 1 Application(s) Topical <User Schedule>  clonazePAM Tablet 2 milliGRAM(s) Oral <User Schedule>  dextrose 5% + lactated ringers. 1000 milliLiter(s) (10 mL/Hr) IV Continuous <Continuous>  enoxaparin Injectable 40 milliGRAM(s) SubCutaneous daily  erythromycin    ethylsuccinate Suspension 40 mG/mL 500 milliGRAM(s) Oral every 8 hours  insulin lispro (HumaLOG) corrective regimen sliding scale   SubCutaneous every 6 hours  lidocaine   Patch 1 Patch Transdermal every 24 hours  metroNIDAZOLE  IVPB      metroNIDAZOLE  IVPB 500 milliGRAM(s) IV Intermittent every 8 hours  mirtazapine 45 milliGRAM(s) Oral <User Schedule>  nystatin    Suspension [id number] Unit(s) Oral four times a day  ondansetron Injectable 4 milliGRAM(s) IV Push once  pantoprazole  Injectable 40 milliGRAM(s) IV Push every 24 hours  predniSONE  Solution 10 milliGRAM(s) Enteral Tube every 24 hours  QUEtiapine 50 milliGRAM(s) Oral <User Schedule>  sertraline 50 milliGRAM(s) Oral daily  vancomycin  IVPB 500 milliGRAM(s) IV Intermittent every 24 hours    MEDICATIONS  (PRN):  nystatin Powder 1 Application(s) Topical two times a day PRN rash/itchiness      Vital Signs Last 24 Hrs  T(C): 37.1 (02 Sep 2018 08:55), Max: 37.1 (02 Sep 2018 08:55)  T(F): 98.8 (02 Sep 2018 08:55), Max: 98.8 (02 Sep 2018 08:55)  HR: 106 (02 Sep 2018 08:55) (94 - 112)  BP: 119/70 (02 Sep 2018 08:55) (103/56 - 135/62)  BP(mean): 89 (01 Sep 2018 16:00) (83 - 94)  RR: 17 (02 Sep 2018 08:55) (17 - 26)  SpO2: 97% (02 Sep 2018 08:55) (93% - 100%)  nc/at  s1s2  cta  soft, nt, nd no guarding or rebound  no c/c/e    CBC Full  -  ( 01 Sep 2018 03:07 )  WBC Count : 8.7 K/uL  Hemoglobin : 8.2 g/dL  Hematocrit : 24.7 %  Platelet Count - Automated : 295 K/uL  Mean Cell Volume : 90.0 fl  Mean Cell Hemoglobin : 29.7 pg  Mean Cell Hemoglobin Concentration : 32.9 gm/dL  Auto Neutrophil # : x  Auto Lymphocyte # : x  Auto Monocyte # : x  Auto Eosinophil # : x  Auto Basophil # : x  Auto Neutrophil % : x  Auto Lymphocyte % : x  Auto Monocyte % : x  Auto Eosinophil % : x  Auto Basophil % : x    09-02    136  |  105  |  18  ----------------------------<  161<H>  4.4   |  21<L>  |  0.86    Ca    8.3<L>      02 Sep 2018 07:39  Phos  3.3     09-02  Mg     1.6     09-02      PT/INR - ( 01 Sep 2018 03:07 )   PT: 13.5 sec;   INR: 1.24 ratio         PTT - ( 01 Sep 2018 03:07 )  PTT:33.7 sec

## 2018-09-03 ENCOUNTER — MOBILE ON CALL (OUTPATIENT)
Age: 68
End: 2018-09-03

## 2018-09-03 LAB
ANION GAP SERPL CALC-SCNC: 10 MMOL/L — SIGNIFICANT CHANGE UP (ref 5–17)
BLD GP AB SCN SERPL QL: NEGATIVE — SIGNIFICANT CHANGE UP
BUN SERPL-MCNC: 17 MG/DL — SIGNIFICANT CHANGE UP (ref 7–23)
CALCIUM SERPL-MCNC: 8.3 MG/DL — LOW (ref 8.4–10.5)
CHLORIDE SERPL-SCNC: 102 MMOL/L — SIGNIFICANT CHANGE UP (ref 96–108)
CO2 SERPL-SCNC: 24 MMOL/L — SIGNIFICANT CHANGE UP (ref 22–31)
CREAT SERPL-MCNC: 0.78 MG/DL — SIGNIFICANT CHANGE UP (ref 0.5–1.3)
GLUCOSE SERPL-MCNC: 174 MG/DL — HIGH (ref 70–99)
HCT VFR BLD CALC: 27.2 % — LOW (ref 34.5–45)
HGB BLD-MCNC: 8.8 G/DL — LOW (ref 11.5–15.5)
MCHC RBC-ENTMCNC: 29.4 PG — SIGNIFICANT CHANGE UP (ref 27–34)
MCHC RBC-ENTMCNC: 32.5 GM/DL — SIGNIFICANT CHANGE UP (ref 32–36)
MCV RBC AUTO: 90.6 FL — SIGNIFICANT CHANGE UP (ref 80–100)
PLATELET # BLD AUTO: 378 K/UL — SIGNIFICANT CHANGE UP (ref 150–400)
POTASSIUM SERPL-MCNC: 4.1 MMOL/L — SIGNIFICANT CHANGE UP (ref 3.5–5.3)
POTASSIUM SERPL-SCNC: 4.1 MMOL/L — SIGNIFICANT CHANGE UP (ref 3.5–5.3)
PROCALCITONIN SERPL-MCNC: 3.48 NG/ML — HIGH (ref 0.02–0.1)
RBC # BLD: 3 M/UL — LOW (ref 3.8–5.2)
RBC # FLD: 13.9 % — SIGNIFICANT CHANGE UP (ref 10.3–14.5)
RH IG SCN BLD-IMP: NEGATIVE — SIGNIFICANT CHANGE UP
SODIUM SERPL-SCNC: 136 MMOL/L — SIGNIFICANT CHANGE UP (ref 135–145)
WBC # BLD: 11.4 K/UL — HIGH (ref 3.8–10.5)
WBC # FLD AUTO: 11.4 K/UL — HIGH (ref 3.8–10.5)

## 2018-09-03 RX ORDER — ACETAMINOPHEN 500 MG
1000 TABLET ORAL ONCE
Qty: 0 | Refills: 0 | Status: COMPLETED | OUTPATIENT
Start: 2018-09-03 | End: 2018-09-03

## 2018-09-03 RX ADMIN — Medication 100 MILLIGRAM(S): at 13:05

## 2018-09-03 RX ADMIN — Medication 500000 UNIT(S): at 06:36

## 2018-09-03 RX ADMIN — Medication 0.5 MILLIGRAM(S): at 00:40

## 2018-09-03 RX ADMIN — Medication 4: at 06:36

## 2018-09-03 RX ADMIN — Medication 10 MILLIGRAM(S): at 00:38

## 2018-09-03 RX ADMIN — Medication 500000 UNIT(S): at 12:25

## 2018-09-03 RX ADMIN — Medication 2 MILLIGRAM(S): at 21:36

## 2018-09-03 RX ADMIN — Medication 500000 UNIT(S): at 17:32

## 2018-09-03 RX ADMIN — Medication 400 MILLIGRAM(S): at 18:58

## 2018-09-03 RX ADMIN — Medication 100 MILLIGRAM(S): at 06:36

## 2018-09-03 RX ADMIN — QUETIAPINE FUMARATE 50 MILLIGRAM(S): 200 TABLET, FILM COATED ORAL at 21:37

## 2018-09-03 RX ADMIN — Medication 3 MILLILITER(S): at 06:36

## 2018-09-03 RX ADMIN — Medication 3 MILLILITER(S): at 12:25

## 2018-09-03 RX ADMIN — SERTRALINE 50 MILLIGRAM(S): 25 TABLET, FILM COATED ORAL at 12:27

## 2018-09-03 RX ADMIN — LIDOCAINE 1 PATCH: 4 CREAM TOPICAL at 09:06

## 2018-09-03 RX ADMIN — Medication 500000 UNIT(S): at 00:38

## 2018-09-03 RX ADMIN — Medication 2: at 12:23

## 2018-09-03 RX ADMIN — CEFEPIME 100 MILLIGRAM(S): 1 INJECTION, POWDER, FOR SOLUTION INTRAMUSCULAR; INTRAVENOUS at 00:38

## 2018-09-03 RX ADMIN — Medication 100 MILLIGRAM(S): at 21:36

## 2018-09-03 RX ADMIN — Medication 100 MILLIGRAM(S): at 07:57

## 2018-09-03 RX ADMIN — MIRTAZAPINE 45 MILLIGRAM(S): 45 TABLET, ORALLY DISINTEGRATING ORAL at 21:36

## 2018-09-03 RX ADMIN — Medication 0.5 MILLIGRAM(S): at 12:26

## 2018-09-03 RX ADMIN — CHLORHEXIDINE GLUCONATE 1 APPLICATION(S): 213 SOLUTION TOPICAL at 06:36

## 2018-09-03 RX ADMIN — Medication 3 MILLILITER(S): at 17:33

## 2018-09-03 RX ADMIN — Medication 3 MILLILITER(S): at 00:38

## 2018-09-03 RX ADMIN — ENOXAPARIN SODIUM 40 MILLIGRAM(S): 100 INJECTION SUBCUTANEOUS at 12:23

## 2018-09-03 RX ADMIN — Medication 500 MILLIGRAM(S): at 00:39

## 2018-09-03 RX ADMIN — Medication 1000 MILLIGRAM(S): at 20:01

## 2018-09-03 RX ADMIN — PANTOPRAZOLE SODIUM 40 MILLIGRAM(S): 20 TABLET, DELAYED RELEASE ORAL at 12:29

## 2018-09-03 NOTE — CHART NOTE - NSCHARTNOTEFT_GEN_A_CORE
Pt seen for consult: nutrition assessment/calorie count.    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 Is s/p PEG 8/24. Course c/b readmission to SICU 8/27 with septic shock requiring pressors, had vomiting episode 8/29 and TF held; TF was resumed 8/31 at goal rate of 10 ml/hr x 24 hours; changed today to goal rate of 30 ml/hr x 24 hours.    Source: Patient [x]    Family [ ]     other [x] electronic medical records    Diet : NPO with tubefeeding via PEG. Please see enteral nutrition orders below.    Patient reports [ ] nausea  [ ] vomiting [ ] diarrhea [ ] constipation  [ ]chewing problems [ ] swallowing issues  [x] other:      PO intake:  < 50% [ ] 50-75% [ ]   % [ ]  other : [x] n/a    Source for PO intake [ ] Patient [ ] family [ ] chart [ ] staff [ ] other : [x] n/a    Enteral/Parenteral Nutrition: via PEG: Pivot 1.5 running at goal of 30 ml/hr x 24 hours  Provides (based on current body weight 56 kG):  Calorie: 1080 kcal (19.3 kcal/kG)  Protein: 67.5 grams (1.2 grams/kG)  Free H2O: 546 ml (10 ml/kG)  Total volume: 720 ml     Current Weight: 123.2 pounds (as of 8/31, [facility]). 127.6 pounds admit wt on 7/26/18.    Pertinent Medications: MEDICATIONS  (STANDING):  acetaminophen  IVPB. 1000 milliGRAM(s) IV Intermittent once  ALBUTerol/ipratropium for Nebulization 3 milliLiter(s) Nebulizer every 6 hours  buDESOnide   0.5 milliGRAM(s) Respule 0.5 milliGRAM(s) Inhalation every 12 hours  chlorhexidine 4% Liquid 1 Application(s) Topical <User Schedule>  clonazePAM Tablet 2 milliGRAM(s) Oral <User Schedule>  enoxaparin Injectable 40 milliGRAM(s) SubCutaneous daily  insulin lispro (HumaLOG) corrective regimen sliding scale   SubCutaneous every 6 hours  lidocaine   Patch 1 Patch Transdermal every 24 hours  metroNIDAZOLE  IVPB      metroNIDAZOLE  IVPB 500 milliGRAM(s) IV Intermittent every 8 hours  mirtazapine 45 milliGRAM(s) Oral <User Schedule>  nystatin    Suspension 957649 Unit(s) Oral four times a day  pantoprazole  Injectable 40 milliGRAM(s) IV Push every 24 hours  predniSONE  Solution 10 milliGRAM(s) Enteral Tube every 24 hours  QUEtiapine 50 milliGRAM(s) Oral <User Schedule>  sertraline 50 milliGRAM(s) Oral daily  vancomycin  IVPB 500 milliGRAM(s) IV Intermittent every 24 hours    MEDICATIONS  (PRN):  nystatin Powder 1 Application(s) Topical two times a day PRN rash/itchiness    Pertinent Labs:  09-03 Na136 mmol/L Glu 174 mg/dL<H> K+ 4.1 mmol/L Cr  0.78 mg/dL BUN 17 mg/dL 09-02 Phos 3.3 mg/dL 08-31 Alb 1.8 g/dL<L>      Skin: L heel DTI, R heel DTI, sacral pressure injury noted; +1 generalized, +2 B/L ankle edema noted      Estimated Needs:   [x] no change since previous assessment  [ ] recalculated:        Nutrition Diagnosis: Increased Nutrient Needs    Nutrition diagnosis is: ongoing, addressed with enteral nutrition       Interventions:     1) Recommend Increase TF Pivot 1.5 via PEG to goal of 35 ml/hr x 24 hours to provide (based off of dosing wt 50.4 kG):  Calorie: 1260 kcal (25 kcal/kG)  Protein: 79 grams protein (1.6 grams/kG)  Free H2O: 638 ml (13 ml/kG)  Total volume: 840 ml   2) Monitor TF tolerance   3) Monitor volume status for necessity of additional free H2O flush via PEG      Monitoring and Evaluation:     [ ] PO intake [x] Tolerance to TF prescription [x] weights [x] follow up per protocol    [x] other: RD remains available: Orquidea Wang MS, RDN, CDN, CDE. #[phone] Pt seen for consult: nutrition assessment. Granddaughter at bedside reports pt appears to be tolerating the TF well, does endorse pt c/o nausea upon increase of TF but the pt denies active nausea at this time.    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 Is s/p PEG 8/24. Course c/b readmission to SICU 8/27 with septic shock requiring pressors, had vomiting episode 8/29 and TF held; TF was resumed 8/31 at goal rate of 10 ml/hr x 24 hours; subsequently increased to 20 ml/hr and changed today to goal rate of 30 ml/hr x 24 hours. Seen in room with TF currently running at 25 ml/hr. Sepsis noted as resolved per ID.    Source: Patient [x]    Family [x] granddaughter at bedside     other [x] electronic medical records    Diet : NPO with tubefeeding via PEG. Please see enteral nutrition orders below.    Patient reports [ ] nausea  [ ] vomiting [ ] diarrhea [ ] constipation  [ ]chewing problems [ ] swallowing issues  [x] other: pt denies active nausea or diarrhea, gastric residuals have been <90ml per flowsheet records    PO intake:  < 50% [ ] 50-75% [ ]   % [ ]  other : [x] n/a    Source for PO intake [ ] Patient [ ] family [ ] chart [ ] staff [ ] other : [x] n/a    Enteral/Parenteral Nutrition: via PEG: Pivot 1.5 running at 25 ml/hr advance by 5 ml Q12 hours as tolerated to goal of 30 ml/hr x 24 hours  Provides (based on current body weight 56 kG):  Calorie: 1080 kcal (19.3 kcal/kG)  Protein: 67.5 grams (1.2 grams/kG)  Free H2O: 546 ml (10 ml/kG)  Total volume: 720 ml     Current Weight: 123.2 pounds (as of 8/31, bedscale). 127.6 pounds admit wt on 7/26/18.    Pertinent Medications: MEDICATIONS  (STANDING):  acetaminophen  IVPB. 1000 milliGRAM(s) IV Intermittent once  ALBUTerol/ipratropium for Nebulization 3 milliLiter(s) Nebulizer every 6 hours  buDESOnide   0.5 milliGRAM(s) Respule 0.5 milliGRAM(s) Inhalation every 12 hours  chlorhexidine 4% Liquid 1 Application(s) Topical <User Schedule>  clonazePAM Tablet 2 milliGRAM(s) Oral <User Schedule>  enoxaparin Injectable 40 milliGRAM(s) SubCutaneous daily  insulin lispro (HumaLOG) corrective regimen sliding scale   SubCutaneous every 6 hours  lidocaine   Patch 1 Patch Transdermal every 24 hours  metroNIDAZOLE  IVPB      metroNIDAZOLE  IVPB 500 milliGRAM(s) IV Intermittent every 8 hours  mirtazapine 45 milliGRAM(s) Oral <User Schedule>  nystatin    Suspension 736218 Unit(s) Oral four times a day  pantoprazole  Injectable 40 milliGRAM(s) IV Push every 24 hours  predniSONE  Solution 10 milliGRAM(s) Enteral Tube every 24 hours  QUEtiapine 50 milliGRAM(s) Oral <User Schedule>  sertraline 50 milliGRAM(s) Oral daily  vancomycin  IVPB 500 milliGRAM(s) IV Intermittent every 24 hours    MEDICATIONS  (PRN):  nystatin Powder 1 Application(s) Topical two times a day PRN rash/itchiness    Pertinent Labs:  09-03 Na136 mmol/L Glu 174 mg/dL<H> K+ 4.1 mmol/L Cr  0.78 mg/dL BUN 17 mg/dL 09-02 Phos 3.3 mg/dL 08-31 Alb 1.8 g/dL<L>      Skin: L heel DTI, R heel DTI, sacral pressure injury noted; +1 generalized, +2 B/L ankle edema noted      Estimated Needs:   [x] no change since previous assessment  [ ] recalculated:        Nutrition Diagnosis: Increased Nutrient Needs    Nutrition diagnosis is: ongoing, addressed with enteral nutrition       Interventions:     1) Recommend Increase TF Pivot 1.5 via PEG (continue to increase in 5 ml/hr increments as tolerated Q12 hours) to goal of 35 ml/hr x 24 hours to provide (based off of dosing wt 50.4 kG):  Calorie: 1260 kcal (25 kcal/kG)  Protein: 79 grams protein (1.6 grams/kG)  Free H2O: 638 ml (13 ml/kG)  Total volume: 840 ml  2) Recommend add Iron x 2 packets daily via PEG to promote wound healing   3) Monitor TF tolerance   4) Monitor volume status for necessity of additional free H2O flush via PEG      Monitoring and Evaluation:     [ ] PO intake [x] Tolerance to TF prescription [x] weights [x] follow up per protocol    [x] other: RD remains available: Orquidea Wang MS, RDN, CDN, CDE. #[phone] Pt seen for consult: nutrition assessment. Granddaughter at bedside reports pt appears to be tolerating the TF well, does endorse pt c/o nausea upon increase of TF but the pt denies active nausea at this time. Nutrition-focused physical exam conducted, reveals sever temporal muscle wasting, severe orbital fat pad loss.    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 Is s/p PEG 8/24. Course c/b readmission to SICU 8/27 with septic shock requiring pressors, had vomiting episode 8/29 and TF held; TF was resumed 8/31 at goal rate of 10 ml/hr x 24 hours; subsequently increased to 20 ml/hr and changed today to goal rate of 30 ml/hr x 24 hours. Seen in room with TF currently running at 25 ml/hr. Sepsis noted as resolved per ID.    Source: Patient [x]    Family [x] granddaughter at bedside     other [x] electronic medical records    Diet : NPO with tubefeeding via PEG. Please see enteral nutrition orders below.    Patient reports [ ] nausea  [ ] vomiting [ ] diarrhea [ ] constipation  [ ]chewing problems [ ] swallowing issues  [x] other: pt denies active nausea or diarrhea, gastric residuals have been <90ml per flowsheet records    PO intake:  < 50% [ ] 50-75% [ ]   % [ ]  other : [x] n/a    Source for PO intake [ ] Patient [ ] family [ ] chart [ ] staff [ ] other : [x] n/a    Enteral/Parenteral Nutrition: via PEG: Pivot 1.5 running at 25 ml/hr advance by 5 ml Q12 hours as tolerated to goal of 30 ml/hr x 24 hours  Provides (based on current body weight 56 kG):  Calorie: 1080 kcal (19.3 kcal/kG)  Protein: 67.5 grams (1.2 grams/kG)  Free H2O: 546 ml (10 ml/kG)  Total volume: 720 ml     Current Weight: 123.2 pounds (as of 8/31, bedscale). 127.6 pounds admit wt on 7/26/18. 4 pound wt loss in house    Pertinent Medications: MEDICATIONS  (STANDING):  acetaminophen  IVPB. 1000 milliGRAM(s) IV Intermittent once  ALBUTerol/ipratropium for Nebulization 3 milliLiter(s) Nebulizer every 6 hours  buDESOnide   0.5 milliGRAM(s) Respule 0.5 milliGRAM(s) Inhalation every 12 hours  chlorhexidine 4% Liquid 1 Application(s) Topical <User Schedule>  clonazePAM Tablet 2 milliGRAM(s) Oral <User Schedule>  enoxaparin Injectable 40 milliGRAM(s) SubCutaneous daily  insulin lispro (HumaLOG) corrective regimen sliding scale   SubCutaneous every 6 hours  lidocaine   Patch 1 Patch Transdermal every 24 hours  metroNIDAZOLE  IVPB      metroNIDAZOLE  IVPB 500 milliGRAM(s) IV Intermittent every 8 hours  mirtazapine 45 milliGRAM(s) Oral <User Schedule>  nystatin    Suspension 509135 Unit(s) Oral four times a day  pantoprazole  Injectable 40 milliGRAM(s) IV Push every 24 hours  predniSONE  Solution 10 milliGRAM(s) Enteral Tube every 24 hours  QUEtiapine 50 milliGRAM(s) Oral <User Schedule>  sertraline 50 milliGRAM(s) Oral daily  vancomycin  IVPB 500 milliGRAM(s) IV Intermittent every 24 hours    MEDICATIONS  (PRN):  nystatin Powder 1 Application(s) Topical two times a day PRN rash/itchiness    Pertinent Labs:  09-03 Na136 mmol/L Glu 174 mg/dL<H> K+ 4.1 mmol/L Cr  0.78 mg/dL BUN 17 mg/dL 09-02 Phos 3.3 mg/dL 08-31 Alb 1.8 g/dL<L>      Skin: L heel DTI, R heel DTI, sacral pressure injury noted; +1 generalized, +2 B/L ankle edema noted      Estimated Needs:   [x] no change since previous assessment  [ ] recalculated:        Nutrition Diagnosis: Increased Nutrient Needs    Nutrition diagnosis is: ongoing, addressed with enteral nutrition     New nutrition diagnosis: Severe Malnutrition related to unclear etiology, ?complicated medical course as evidenced by NFPE findings, wt loss in-house    Interventions:     1) Recommend Increase TF Pivot 1.5 via PEG (continue to increase in 5 ml/hr increments as tolerated Q12 hours) to goal of 35 ml/hr x 24 hours to provide (based off of dosing wt 50.4 kG):  Calorie: 1260 kcal (25 kcal/kG)  Protein: 79 grams protein (1.6 grams/kG)  Free H2O: 638 ml (13 ml/kG)  Total volume: 840 ml  2) Recommend add Iron x 2 packets daily via PEG to promote wound healing   3) Monitor TF tolerance   4) Monitor volume status for necessity of additional free H2O flush via PEG      Monitoring and Evaluation:     [ ] PO intake [x] Tolerance to TF prescription [x] weights [x] follow up per protocol    [x] other: RD remains available: Orquidea Wang MS, RDN, CDN, CDE. #[phone]

## 2018-09-03 NOTE — PROGRESS NOTE ADULT - SUBJECTIVE AND OBJECTIVE BOX
MYRIAM WHITE:[id number],   68yFemale followed for:  ASA; dye contrast (Anaphylaxis)  aspirin (Short breath)  divalproex sodium (Other (Unknown))  Haldol (Other (Unknown))  penicillin (Short breath; Rash)  sulfa drugs (Short breath; Rash)  Xanax (Other (Unknown))    PAST MEDICAL & SURGICAL HISTORY:  CVA (cerebral vascular accident)  Fatty pancreas  PNA (pneumonia)  Pulmonary HTN  IGT (impaired glucose tolerance)  Ulcerative colitis  Acid reflux  Anxiety  Depression  Mouth sores  HLD (hyperlipidemia)  Asthma  Humeral head fracture  H/O: hysterectomy  H/O cataract extraction, left  History of knee replacement    FAMILY HISTORY:  Family history of dementia (Grandparent)  Family history of colon cancer (Grandparent)    MEDICATIONS  (STANDING):  acetaminophen  IVPB. 1000 milliGRAM(s) IV Intermittent once  ALBUTerol/ipratropium for Nebulization 3 milliLiter(s) Nebulizer every 6 hours  buDESOnide   0.5 milliGRAM(s) Respule 0.5 milliGRAM(s) Inhalation every 12 hours  chlorhexidine 4% Liquid 1 Application(s) Topical <User Schedule>  clonazePAM Tablet 2 milliGRAM(s) Oral <User Schedule>  enoxaparin Injectable 40 milliGRAM(s) SubCutaneous daily  insulin lispro (HumaLOG) corrective regimen sliding scale   SubCutaneous every 6 hours  lidocaine   Patch 1 Patch Transdermal every 24 hours  metroNIDAZOLE  IVPB      metroNIDAZOLE  IVPB 500 milliGRAM(s) IV Intermittent every 8 hours  mirtazapine 45 milliGRAM(s) Oral <User Schedule>  nystatin    Suspension [id number] Unit(s) Oral four times a day  pantoprazole  Injectable 40 milliGRAM(s) IV Push every 24 hours  predniSONE  Solution 10 milliGRAM(s) Enteral Tube every 24 hours  QUEtiapine 50 milliGRAM(s) Oral <User Schedule>  sertraline 50 milliGRAM(s) Oral daily  vancomycin  IVPB 500 milliGRAM(s) IV Intermittent every 24 hours    MEDICATIONS  (PRN):  nystatin Powder 1 Application(s) Topical two times a day PRN rash/itchiness      Vital Signs Last 24 Hrs  T(C): 36.7 (03 Sep 2018 06:26), Max: 37.1 (03 Sep 2018 00:18)  T(F): 98 (03 Sep 2018 06:26), Max: 98.8 (03 Sep 2018 00:18)  HR: 110 (03 Sep 2018 06:26) (100 - 112)  BP: 146/72 (03 Sep 2018 06:26) (127/73 - 162/75)  BP(mean): --  RR: 18 (03 Sep 2018 06:26) (18 - 18)  SpO2: 99% (03 Sep 2018 07:00) (93% - 99%)  nc/at  s1s2  cta  soft, nt, nd no guarding or rebound  no c/c/e    CBC Full  -  ( 02 Sep 2018 10:09 )  WBC Count : 9.78 K/uL  Hemoglobin : 8.8 g/dL  Hematocrit : 27.9 %  Platelet Count - Automated : 299 K/uL  Mean Cell Volume : 93.6 fl  Mean Cell Hemoglobin : 29.5 pg  Mean Cell Hemoglobin Concentration : 31.5 gm/dL  Auto Neutrophil # : x  Auto Lymphocyte # : x  Auto Monocyte # : x  Auto Eosinophil # : x  Auto Basophil # : x  Auto Neutrophil % : x  Auto Lymphocyte % : x  Auto Monocyte % : x  Auto Eosinophil % : x  Auto Basophil % : x    09-02    136  |  105  |  18  ----------------------------<  161<H>  4.4   |  21<L>  |  0.86    Ca    8.3<L>      02 Sep 2018 07:39  Phos  3.3     09-02  Mg     1.6     09-02      PT/INR - ( 02 Sep 2018 10:09 )   PT: 13.5 sec;   INR: 1.19 ratio         PTT - ( 02 Sep 2018 10:09 )  PTT:35.4 sec

## 2018-09-03 NOTE — CHART NOTE - NSCHARTNOTEFT_GEN_A_CORE
Upon Nutritional Assessment by the Registered Dietitian your patient was determined to meet criteria / has evidence of the following diagnosis/diagnoses:          [ ]  Mild Protein Calorie Malnutrition        [ ]  Moderate Protein Calorie Malnutrition        [x] Severe Protein Calorie Malnutrition        [ ] Unspecified Protein Calorie Malnutrition        [ ] Underweight / BMI <19        [ ] Morbid Obesity / BMI > 40      Findings as based on:  [x] Comprehensive nutrition assessment   [x] Nutrition Focused Physical Exam  [ ] Other:       Nutrition Plan/Recommendations:      Please see RD chart note 9/3/18 for recommendations and interventions    PROVIDER Section:     By signing this assessment you are acknowledging and agree with the diagnosis/diagnoses assigned by the Registered Dietitian    Comments:

## 2018-09-03 NOTE — PROGRESS NOTE ADULT - SUBJECTIVE AND OBJECTIVE BOX
Patient resting without complaints.  Family @ bedside    Denies chest pain, SOB, N/V.    Exam:  Drowsy but arousable. No acute distress  Peg tube in place    ICU Vital Signs Last 24 Hrs  T(C): 36.7 (03 Sep 2018 06:26), Max: 37.1 (03 Sep 2018 00:18)  T(F): 98 (03 Sep 2018 06:26), Max: 98.8 (03 Sep 2018 00:18)  HR: 110 (03 Sep 2018 06:26) (100 - 112)  BP: 146/72 (03 Sep 2018 06:26) (127/73 - 162/75)  BP(mean): --  ABP: --  ABP(mean): --  RR: 18 (03 Sep 2018 06:26) (18 - 18)  SpO2: 99% (03 Sep 2018 07:00) (93% - 99%)      R lower extremity:  Hip dsg changed; distal aspect of incision open w/ about 5cm of granulation tissue; betadine, WTD, ABDs & new dsg applied  Calf soft, non-tender  (+) motor / sensory to light touch  +DP pulse                          8.8    9.78  )-----------( 299      ( 02 Sep 2018 10:09 )             27.9        136  |  105  |  18  ----------------------------<  161<H>  4.4   |  21<L>  |  0.86      A/P: 68y Female s/p R hip PJI explant and placement of cement spacer with distal femur ORIF, POD 30; Stable  -Pain control  --Tube feeds increased  -DVT ppx; Encourage IS  -ck labs:   Transfuse IF Hgb <8 (as per MED)  -NWB RLE; Flat foot weight bearing LLE  -Continue Abx: ID note appreciated      ***See Above  Tad QUEEN  Orthopedics  B: [phone]  S: [phone]

## 2018-09-03 NOTE — PROGRESS NOTE ADULT - SUBJECTIVE AND OBJECTIVE BOX
---___---___---___---___---___---___ ---___---___---___---___---___---___---___---___---___---                    <<has      ---___---___---___---___---___---      <<<  MEDICATIONS:  >>>    MEDICATIONS  (STANDING):  acetaminophen  IVPB. 1000 milliGRAM(s) IV Intermittent once  ALBUTerol/ipratropium for Nebulization 3 milliLiter(s) Nebulizer every 6 hours  buDESOnide   0.5 milliGRAM(s) Respule 0.5 milliGRAM(s) Inhalation every 12 hours  chlorhexidine 4% Liquid 1 Application(s) Topical <User Schedule>  clonazePAM Tablet 2 milliGRAM(s) Oral <User Schedule>  enoxaparin Injectable 40 milliGRAM(s) SubCutaneous daily  insulin lispro (HumaLOG) corrective regimen sliding scale   SubCutaneous every 6 hours  lidocaine   Patch 1 Patch Transdermal every 24 hours  metroNIDAZOLE  IVPB      metroNIDAZOLE  IVPB 500 milliGRAM(s) IV Intermittent every 8 hours  mirtazapine 45 milliGRAM(s) Oral <User Schedule>  nystatin    Suspension 825060 Unit(s) Oral four times a day  pantoprazole  Injectable 40 milliGRAM(s) IV Push every 24 hours  predniSONE  Solution 10 milliGRAM(s) Enteral Tube every 24 hours  QUEtiapine 50 milliGRAM(s) Oral <User Schedule>  sertraline 50 milliGRAM(s) Oral daily  vancomycin  IVPB 500 milliGRAM(s) IV Intermittent every 24 hours      MEDICATIONS  (PRN):  nystatin Powder 1 Application(s) Topical two times a day PRN rash/itchiness       ---___---___---___---___---___---     <<<REVIEW OF SYSTEM: >>>    GEN: no fever, no chills, no pain  RESP: no SOB, no cough, no sputum  CVS: no chest pain, no palpitations, no edema  GI: no abdominal pain, no nausea, no vomiting, no constipation, no diarrhea  : no dysurea, no frequency  NEURO: no headache, no dizziness  PSYCH: no depression, not anxious  Derm : no itching, no rash     ---___---___---___---___---___---          <<<  VITAL SIGNS: >>>    T(F): 98 (09-03-18 @ 06:26), Max: 98.8 (09-03-18 @ 00:18)  HR: 110 (09-03-18 @ 06:26) (100 - 112)  BP: 146/72 (09-03-18 @ 06:26) (127/73 - 162/75)  RR: 18 (09-03-18 @ 06:26) (18 - 18)  SpO2: 99% (09-03-18 @ 07:00) (93% - 99%)  Wt(kg): --  CAPILLARY BLOOD GLUCOSE      POCT Blood Glucose.: 202 mg/dL (03 Sep 2018 06:31)    I&O's Summary    02 Sep 2018 07:01  -  03 Sep 2018 07:00  --------------------------------------------------------  IN: 210 mL / OUT: 1900 mL / NET: -1690 mL    03 Sep 2018 07:01  -  03 Sep 2018 09:35  --------------------------------------------------------  IN: 100 mL / OUT: 0 mL / NET: 100 mL         ---___---___---___---___---___---                       PHYSICAL EXAM:    GEN: A&O X 3 , NAD , comfortable  HEENT: NCAT, PERRL, MMM, no scleral icterus, hearing intact  NECK: Supple, No JVD  CVS: S1S2 , regular , No M/R/G appreciated  PULM: CTA B/L,  no W/R/R appreciated  ABD.: soft. non tender, non distended,  bowel sounds present  Extrem: intact pulses , no edema noted  Derm: No rash or ecchymosis noted  PSYCH: normal mood, no depression, not anxious     ---___---___---___---___---___---     <<<  LAB AND IMAGING: >>>                          8.8    9.78  )-----------( 299      ( 02 Sep 2018 10:09 )             27.9               09-02    136  |  105  |  18  ----------------------------<  161<H>  4.4   |  21<L>  |  0.86    Ca    8.3<L>      02 Sep 2018 07:39  Phos  3.3     09-02  Mg     1.6     09-02      PT/INR - ( 02 Sep 2018 10:09 )   PT: 13.5 sec;   INR: 1.19 ratio         PTT - ( 02 Sep 2018 10:09 )  PTT:35.4 sec                           [All pertinent / recent available Imaging reports and other labs reviewed]     ---___---___---___---___---___---___ ---___---___---___---___---           <<<  A S S E S S M E N T   A N D   P L A N :  >>>          -GI/DVT Prophylaxis.    --------------------------------------------  Case discussed with  kendra roque    Education given on     >>______________________<<      Deniz Bolden .         phone   [phone]

## 2018-09-03 NOTE — PROGRESS NOTE ADULT - SUBJECTIVE AND OBJECTIVE BOX
CC: f/u for MRSA bacteremia, infected R hip, she is receiving treatment     Patient in bed she is awake in bed, confused     REVIEW OF SYSTEMS:  All other review of systems negative (Comprehensive ROS)- limited    Antimicrobials:  cefepime   IVPB 1000 milliGRAM(s) IV Intermittent every 24 hours  cefepime   IVPB      erythromycin    ethylsuccinate Suspension 40 mG/mL 500 milliGRAM(s) Oral every 8 hours  metroNIDAZOLE  IVPB      metroNIDAZOLE  IVPB 500 milliGRAM(s) IV Intermittent every 8 hours  vancomycin  IVPB 500 milliGRAM(s) IV Intermittent every 24 hours    Other Medications Reviewed    Vital Signs Last 24 Hrs  T(C): 36.7 (03 Sep 2018 06:26), Max: 37.1 (02 Sep 2018 08:55)  T(F): 98 (03 Sep 2018 06:26), Max: 98.8 (02 Sep 2018 08:55)  HR: 110 (03 Sep 2018 06:26) (100 - 112)  BP: 146/72 (03 Sep 2018 06:26) (119/70 - 162/75)  BP(mean): --  RR: 18 (03 Sep 2018 06:26) (17 - 18)  SpO2: 99% (03 Sep 2018 07:00) (93% - 99%)    PHYSICAL EXAM:  General: no acute distress  Eyes:  anicteric, no conjunctival injection, no discharge  Oropharynx: no lesions or injection 	  Neck: supple, without adenopathy  Lungs: decreased BS bases  Heart: regular rate and rhythm; no murmur, rubs or gallops  Abdomen: soft, nondistended, nontender, G tube site clean  Skin: no lesions  Extremities: no edema.  R hip wound dressings intact.  L PICC site clean  Neurologic: more alert/awake, moves all extremities    LAB RESULTS:                                           8.8    9.78  )-----------( 299      ( 02 Sep 2018 10:09 )             27.9   09-02    136  |  105  |  18  ----------------------------<  161<H>  4.4   |  21<L>  |  0.86    Ca    8.3<L>      02 Sep 2018 07:39  Phos  3.3     09-02  Mg     1.6     09-02    Vancomycin Level, Trough (09.02.18 @ 07:39)    Vancomycin Level, Trough: 15.5    Procalcitonin  08-31-18 @ 02:34   -  27.09      MICROBIOLOGY:  RECENT CULTURES:  Culture - Blood (08.28.18 @ 05:35)    Specimen Source: .Blood Blood-Peripheral    Culture Results:   No growth to date.    08-26 @ 23:03 .Blood Blood     No growth to date.      RADIOLOGY REVIEWED:  < from: Xray Chest 1 View- PORTABLE-Routine (09.01.18 @ 07:29) >  Left effusion and left basilar atelectasis. Right upper lobe patchy and   linear opacities, unchanged.    CT Abdomen and Pelvis No Cont (08.27.18 @ 09:35) >  No acute intra abdominal pathology or collection.  Left acetabular fracture as at recent prior imaging.

## 2018-09-03 NOTE — PROGRESS NOTE ADULT - ASSESSMENT
69 yo female with dementia, history of UC, and s/p R Hip hemiarthroplasty 6/22/18, post op hematoma  Admitted after left hip fx, noted severe sepsis, MRSA bacteremia, infected R hip- s/p GABRIEL and spacer  Stormy post op course with subsequent episode of sepsis- unclear source, resolved   S/p PEG and febrile again, transient low bp with no source apparent, again.   Covered with Cefepime and Flagyl, along with same Vanco  Remains afebrile, surveillance Bld Cxs negative, CT ABD negative, CXR no consolidation, WBC normal  Now out of ICU  Much improved procalcitonin level 27------>3    Plan:  Continue vanco (Day 30 of 42) MRSA tx   Empiric  cefepime and flagyl (Day 7)  last day of cefepime and flagyl as planned   continue supportive care as per ortho

## 2018-09-04 ENCOUNTER — TRANSCRIPTION ENCOUNTER (OUTPATIENT)
Age: 68
End: 2018-09-04

## 2018-09-04 DIAGNOSIS — T84.50XA INFECTION AND INFLAMMATORY REACTION DUE TO UNSPECIFIED INTERNAL JOINT PROSTHESIS, INITIAL ENCOUNTER: ICD-10-CM

## 2018-09-04 LAB
GLUCOSE BLDC GLUCOMTR-MCNC: 98 MG/DL — SIGNIFICANT CHANGE UP (ref 70–99)
GLUCOSE BLDC GLUCOMTR-MCNC: 99 MG/DL — SIGNIFICANT CHANGE UP (ref 70–99)

## 2018-09-04 RX ORDER — MAGNESIUM OXIDE/MAG AA CHELATE 133 MG
1 TABLET ORAL
Qty: 0 | Refills: 0 | COMMUNITY

## 2018-09-04 RX ORDER — ENOXAPARIN SODIUM 100 MG/ML
40 INJECTION SUBCUTANEOUS
Qty: 0 | Refills: 0 | COMMUNITY
Start: 2018-09-04

## 2018-09-04 RX ORDER — NYSTATIN 500MM UNIT
5 POWDER (EA) MISCELLANEOUS
Qty: 0 | Refills: 0 | COMMUNITY
Start: 2018-09-04

## 2018-09-04 RX ORDER — LIDOCAINE 4 G/100G
1 CREAM TOPICAL
Qty: 0 | Refills: 0 | COMMUNITY
Start: 2018-09-04

## 2018-09-04 RX ORDER — ACETAMINOPHEN 500 MG
1000 TABLET ORAL ONCE
Qty: 0 | Refills: 0 | Status: COMPLETED | OUTPATIENT
Start: 2018-09-04 | End: 2018-09-04

## 2018-09-04 RX ORDER — BUDESONIDE, MICRONIZED 100 %
1 POWDER (GRAM) MISCELLANEOUS
Qty: 0 | Refills: 0 | COMMUNITY
Start: 2018-09-04

## 2018-09-04 RX ORDER — NYSTATIN CREAM 100000 [USP'U]/G
1 CREAM TOPICAL
Qty: 0 | Refills: 0 | COMMUNITY
Start: 2018-09-04

## 2018-09-04 RX ORDER — ZILEUTON 600 MG/1
2 TABLET, MULTILAYER, EXTENDED RELEASE ORAL
Qty: 0 | Refills: 0 | COMMUNITY

## 2018-09-04 RX ORDER — ACETAMINOPHEN 500 MG
1000 TABLET ORAL EVERY 8 HOURS
Qty: 0 | Refills: 0 | Status: COMPLETED | OUTPATIENT
Start: 2018-09-04 | End: 2018-09-04

## 2018-09-04 RX ORDER — LANOLIN ALCOHOL/MO/W.PET/CERES
1 CREAM (GRAM) TOPICAL
Qty: 0 | Refills: 0 | COMMUNITY

## 2018-09-04 RX ORDER — DOCUSATE SODIUM 100 MG
1 CAPSULE ORAL
Qty: 0 | Refills: 0 | COMMUNITY

## 2018-09-04 RX ORDER — IPRATROPIUM/ALBUTEROL SULFATE 18-103MCG
3 AEROSOL WITH ADAPTER (GRAM) INHALATION
Qty: 0 | Refills: 0 | COMMUNITY
Start: 2018-09-04

## 2018-09-04 RX ORDER — ACETAMINOPHEN 500 MG
1000 TABLET ORAL EVERY 8 HOURS
Qty: 0 | Refills: 0 | Status: DISCONTINUED | OUTPATIENT
Start: 2018-09-04 | End: 2018-10-04

## 2018-09-04 RX ORDER — SENNA PLUS 8.6 MG/1
1 TABLET ORAL
Qty: 0 | Refills: 0 | COMMUNITY

## 2018-09-04 RX ADMIN — LIDOCAINE 1 PATCH: 4 CREAM TOPICAL at 22:16

## 2018-09-04 RX ADMIN — Medication 3 MILLILITER(S): at 13:06

## 2018-09-04 RX ADMIN — LIDOCAINE 1 PATCH: 4 CREAM TOPICAL at 00:25

## 2018-09-04 RX ADMIN — PANTOPRAZOLE SODIUM 40 MILLIGRAM(S): 20 TABLET, DELAYED RELEASE ORAL at 13:19

## 2018-09-04 RX ADMIN — Medication 400 MILLIGRAM(S): at 09:30

## 2018-09-04 RX ADMIN — Medication 500000 UNIT(S): at 13:05

## 2018-09-04 RX ADMIN — Medication 2 MILLIGRAM(S): at 22:17

## 2018-09-04 RX ADMIN — QUETIAPINE FUMARATE 50 MILLIGRAM(S): 200 TABLET, FILM COATED ORAL at 22:17

## 2018-09-04 RX ADMIN — ENOXAPARIN SODIUM 40 MILLIGRAM(S): 100 INJECTION SUBCUTANEOUS at 13:06

## 2018-09-04 RX ADMIN — MIRTAZAPINE 45 MILLIGRAM(S): 45 TABLET, ORALLY DISINTEGRATING ORAL at 22:17

## 2018-09-04 RX ADMIN — Medication 3 MILLILITER(S): at 06:38

## 2018-09-04 RX ADMIN — Medication 400 MILLIGRAM(S): at 19:46

## 2018-09-04 RX ADMIN — Medication 2: at 13:07

## 2018-09-04 RX ADMIN — Medication 0.5 MILLIGRAM(S): at 00:26

## 2018-09-04 RX ADMIN — Medication 100 MILLIGRAM(S): at 09:13

## 2018-09-04 RX ADMIN — Medication 4: at 06:56

## 2018-09-04 RX ADMIN — Medication 1000 MILLIGRAM(S): at 09:45

## 2018-09-04 RX ADMIN — Medication 0.5 MILLIGRAM(S): at 13:06

## 2018-09-04 RX ADMIN — Medication 10 MILLIGRAM(S): at 00:25

## 2018-09-04 RX ADMIN — CHLORHEXIDINE GLUCONATE 1 APPLICATION(S): 213 SOLUTION TOPICAL at 06:45

## 2018-09-04 RX ADMIN — SERTRALINE 50 MILLIGRAM(S): 25 TABLET, FILM COATED ORAL at 13:19

## 2018-09-04 RX ADMIN — Medication 500000 UNIT(S): at 19:46

## 2018-09-04 RX ADMIN — Medication 3 MILLILITER(S): at 18:14

## 2018-09-04 RX ADMIN — LIDOCAINE 1 PATCH: 4 CREAM TOPICAL at 13:22

## 2018-09-04 RX ADMIN — Medication 500000 UNIT(S): at 06:38

## 2018-09-04 RX ADMIN — Medication 1000 MILLIGRAM(S): at 20:30

## 2018-09-04 RX ADMIN — Medication 500000 UNIT(S): at 00:26

## 2018-09-04 RX ADMIN — Medication 3 MILLILITER(S): at 00:25

## 2018-09-04 NOTE — PROGRESS NOTE ADULT - ASSESSMENT
69 yo female with dementia, history of UC, and s/p R Hip hemiarthroplasty 6/22/18, post op hematoma  Admitted after left hip fx, noted severe sepsis, MRSA bacteremia, infected R hip- s/p GABRIEL and spacer  Stormy post op course with subsequent episode of sepsis- unclear source, resolved   S/p PEG and febrile again, transient low bp with no source apparent, again.   Covered with Cefepime and Flagyl, along with same Vanco  Remains afebrile, surveillance Bld Cxs negative, CT ABD negative, CXR no consolidation, WBC normal  Now out of ICU  Much improved procalcitonin level 27------>3  she completed a seven day course of cefepime and flagyl for sepsis unclear source sepsis has resolved   last  vanco was therapeutic at 15    Plan:  Continue vanco (Day 31 of 42) MRSA tx   continue supportive care as per ortho

## 2018-09-04 NOTE — DISCHARGE NOTE ADULT - CARE PLAN
Principal Discharge DX:	Infection of prosthetic joint, initial encounter  Goal:	Resolution of infection, improved ADL's  Assessment and plan of treatment:	Please follow up with Dr. Rey on discharge from rehab (call for appointment).  PT- non weight bearing RLE, Flat foot weight bearing LLE.  Lovenox x 6 weeks total for dvt prevention.  Complete antibiotics as prescribed.  Please follow up with Dr. Yan for management of PEG tube, possible removal in future when tolerating food by mouth. Principal Discharge DX:	Infection of prosthetic joint, initial encounter  Goal:	Resolution of infection, improved ADL's  Assessment and plan of treatment:	Please follow up with Dr. Rey on discharge from rehab (call for appointment).  PT- non weight bearing Right lower extremity, Flat foot weight bearing Left lower extremity.  Lovenox x 6 weeks total for dvt prevention.  Complete antibiotics as prescribed.  Secondary Diagnosis:	Sepsis due to methicillin resistant Staphylococcus aureus (MRSA)  Assessment and plan of treatment:	Completed course of intravenous antibiotics  Continue with Minocycline as prescribed  Secondary Diagnosis:	Acetabular fracture  Assessment and plan of treatment:	Left.  Secondary Diagnosis:	Hyperglycemia, drug-induced  Assessment and plan of treatment:	Your on Chronic Prednisone  Seen by Endocrine, continue with Insulin as prescribed  Secondary Diagnosis:	Hyponatremia  Assessment and plan of treatment:	Condition improved  Secondary Diagnosis:	Dysphagia  Assessment and plan of treatment:	Please follow up with Dr. Yan for management of PEG tube, possible removal in future when tolerating food by mouth. Principal Discharge DX:	Infection of prosthetic joint, initial encounter  Goal:	Resolution of infection, improved ADL's  Assessment and plan of treatment:	Please follow up with Dr. Rey on discharge from rehab (call for appointment).  PT- non weight bearing Right lower extremity, Flat foot weight bearing Left lower extremity.  Lovenox x 6 weeks total for dvt prevention.  Complete antibiotics as prescribed.  Follow up with Dr. Arambula in 1 week  Secondary Diagnosis:	Sepsis due to methicillin resistant Staphylococcus aureus (MRSA)  Assessment and plan of treatment:	Completed course of intravenous antibiotics  Continue with Minocycline as prescribed  Secondary Diagnosis:	Acetabular fracture  Assessment and plan of treatment:	Left.  Follow up with Dr. Rey  Secondary Diagnosis:	Hyperglycemia, drug-induced  Assessment and plan of treatment:	Your on Chronic Prednisone  Seen by Endocrine, continue with Insulin as prescribed  Secondary Diagnosis:	Hyponatremia  Assessment and plan of treatment:	Condition improved  Secondary Diagnosis:	Dysphagia  Assessment and plan of treatment:	Please follow up with Dr. Yan for management of PEG tube, possible removal in future when tolerating food by mouth. Principal Discharge DX:	Infection of prosthetic joint, initial encounter  Goal:	Resolution of infection, improved ADL's  Assessment and plan of treatment:	Please follow up with Dr. Rey on discharge from rehab (call for appointment).  PT- weight bearing as tolerated on Right lower extremity, Partial weight bearing 50%, protected weight bearing and Flat foot weight bearing Left lower extremity.  Complete antibiotics as prescribed.  Follow up with Dr. Arambula-Plastic surgery in 1 week  Secondary Diagnosis:	Sepsis due to methicillin resistant Staphylococcus aureus (MRSA)  Assessment and plan of treatment:	Completed course of intravenous antibiotics  Continue with Minocycline as prescribed  Secondary Diagnosis:	Acetabular fracture  Assessment and plan of treatment:	Left.  Follow up with Dr. Rey  Secondary Diagnosis:	Hyperglycemia, drug-induced  Assessment and plan of treatment:	Your on Chronic Prednisone  Seen by Endocrine, continue with Insulin as prescribed  Secondary Diagnosis:	Hyponatremia  Assessment and plan of treatment:	Condition improved  Secondary Diagnosis:	Dysphagia  Assessment and plan of treatment:	Please follow up with Dr. Yan Principal Discharge DX:	Infection of prosthetic joint, initial encounter  Goal:	Resolution of infection, improved ADL's  Assessment and plan of treatment:	Please follow up with Dr. Rey on discharge from rehab (call for appointment).    PT- weight bearing as tolerated on Right lower extremity, Partial weight bearing 50%, protected weight bearing and Flat foot weight bearing Left lower extremity.  Complete antibiotics as prescribed.  Follow up with Dr. Arambula-Plastic surgery in 2 weeks  Secondary Diagnosis:	Sepsis due to methicillin resistant Staphylococcus aureus (MRSA)  Assessment and plan of treatment:	Completed course of intravenous antibiotics  Continue with Minocycline as prescribed  Follow up with Dr. Powell after rehab  Secondary Diagnosis:	Acetabular fracture  Assessment and plan of treatment:	Left.  Follow up with Dr. Rey  Secondary Diagnosis:	Hyperglycemia, drug-induced  Assessment and plan of treatment:	Your on Chronic Prednisone  continue with Insulin as prescribed  Secondary Diagnosis:	Hyponatremia  Assessment and plan of treatment:	Condition improved  Secondary Diagnosis:	Dysphagia  Assessment and plan of treatment:	Continue Peg feeds Principal Discharge DX:	Infection of prosthetic joint, initial encounter  Goal:	Resolution of infection, improved ADL's  Assessment and plan of treatment:	Please follow up with Dr. Rey on discharge from rehab (call for appointment).    PT- weight bearing as tolerated on Right lower extremity, Partial weight bearing 50%, protected weight bearing and Flat foot weight bearing Left lower extremity.    Follow up with Dr. Arambula-Plastic surgery in 2 weeks  Secondary Diagnosis:	Sepsis due to methicillin resistant Staphylococcus aureus (MRSA)  Assessment and plan of treatment:	Completed course of intravenous antibiotics  Continue with Minocycline as prescribed  Follow up with Dr. Powell after rehab  Secondary Diagnosis:	Acetabular fracture  Assessment and plan of treatment:	Left.  Follow up with Dr. Rey  Secondary Diagnosis:	Hyperglycemia, drug-induced  Assessment and plan of treatment:	Your on Chronic Prednisone  continue with Insulin as prescribed  Secondary Diagnosis:	Hyponatremia  Assessment and plan of treatment:	Condition improved  Secondary Diagnosis:	Dysphagia  Assessment and plan of treatment:	Continue Peg feeds

## 2018-09-04 NOTE — PROGRESS NOTE ADULT - SUBJECTIVE AND OBJECTIVE BOX
INTERVAL HPI/OVERNIGHT EVENTS: improved, awake, alert, no complaints    MEDICATIONS  (STANDING):  acetaminophen  IVPB. 1000 milliGRAM(s) IV Intermittent once  ALBUTerol/ipratropium for Nebulization 3 milliLiter(s) Nebulizer every 6 hours  buDESOnide   0.5 milliGRAM(s) Respule 0.5 milliGRAM(s) Inhalation every 12 hours  chlorhexidine 4% Liquid 1 Application(s) Topical <User Schedule>  clonazePAM Tablet 2 milliGRAM(s) Oral <User Schedule>  enoxaparin Injectable 40 milliGRAM(s) SubCutaneous daily  insulin lispro (HumaLOG) corrective regimen sliding scale   SubCutaneous every 6 hours  lidocaine   Patch 1 Patch Transdermal every 24 hours  mirtazapine 45 milliGRAM(s) Oral <User Schedule>  nystatin    Suspension 451276 Unit(s) Oral four times a day  pantoprazole  Injectable 40 milliGRAM(s) IV Push every 24 hours  predniSONE  Solution 10 milliGRAM(s) Enteral Tube every 24 hours  QUEtiapine 50 milliGRAM(s) Oral <User Schedule>  sertraline 50 milliGRAM(s) Oral daily  vancomycin  IVPB 500 milliGRAM(s) IV Intermittent every 24 hours    MEDICATIONS  (PRN):  nystatin Powder 1 Application(s) Topical two times a day PRN rash/itchiness      Allergies    ASA; dye contrast (Anaphylaxis)  aspirin (Short breath)  divalproex sodium (Other (Unknown))  Haldol (Other (Unknown))  penicillin (Short breath; Rash)  sulfa drugs (Short breath; Rash)  Xanax (Other (Unknown))    Intolerances            PHYSICAL EXAM:   Vital Signs:  Vital Signs Last 24 Hrs  T(C): 36.9 (04 Sep 2018 01:08), Max: 37.1 (03 Sep 2018 10:45)  T(F): 98.4 (04 Sep 2018 01:08), Max: 98.8 (03 Sep 2018 10:45)  HR: 112 (04 Sep 2018 01:08) (105 - 112)  BP: 131/72 (04 Sep 2018 01:08) (124/79 - 145/73)  BP(mean): --  RR: 18 (04 Sep 2018 01:08) (18 - 18)  SpO2: 100% (04 Sep 2018 01:08) (95% - 100%)  Daily     Daily     GENERAL:  no distress  HEENT:  NC/AT,  anicteric  CHEST:   no increased effort, breath sounds clear  HEART:  Regular rhythm  ABDOMEN:  Soft, non-tender, non-distended, normoactive bowel sounds,  no masses ,peg site clean  EXTEREMITIES:  no cyanosis      LABS:                        8.8    11.4  )-----------( 378      ( 03 Sep 2018 10:23 )             27.2     09-03    136  |  102  |  17  ----------------------------<  174<H>  4.1   |  24  |  0.78    Ca    8.3<L>      03 Sep 2018 10:23      PT/INR - ( 02 Sep 2018 10:09 )   PT: 13.5 sec;   INR: 1.19 ratio         PTT - ( 02 Sep 2018 10:09 )  PTT:35.4 sec      RADIOLOGY & ADDITIONAL TESTS:

## 2018-09-04 NOTE — PROGRESS NOTE ADULT - SUBJECTIVE AND OBJECTIVE BOX
Orthopedic Progress Note     S:  No acute events overnight, pain is well controlled.  Patient denies any chest pain, SOB, N/V, fevers/chills.    T(C): 36.9 (09-04-18 @ 01:08), Max: 37.1 (09-03-18 @ 10:45)  HR: 112 (09-04-18 @ 01:08) (105 - 112)  BP: 131/72 (09-04-18 @ 01:08) (124/79 - 145/73)  RR: 18 (09-04-18 @ 01:08) (18 - 18)  SpO2: 100% (09-04-18 @ 01:08) (95% - 100%)  Wt(kg): --I&O's Summary    02 Sep 2018 07:01  -  03 Sep 2018 07:00  --------------------------------------------------------  IN: 210 mL / OUT: 1900 mL / NET: -1690 mL    03 Sep 2018 07:01  -  04 Sep 2018 06:43  --------------------------------------------------------  IN: 615 mL / OUT: 775 mL / NET: -160 mL        O:  Physical exam:  Gen: Alert and Oriented x3, No Acute Distress  CARDS: +S1/S2, RRR  PULM: CTAB  ABD: soft, nondistended, nontender to palpation  RLE:            Dressing: c/d/i, changed at bedside with betadine wash, wet to dry dressing.  Proximal incision well-healing, distally 5cm opening with granulation tissue formation.  No purulence/discharge.            Motor: wiggles toes            Sensation: SILT in all dermatomes            Pulses: 2+, WWP distally.  Calf soft/compressible           Labs:                        8.8    11.4  )-----------( 378      ( 03 Sep 2018 10:23 )             27.2    09-03    136  |  102  |  17  ----------------------------<  174<H>  4.1   |  24  |  0.78    Ca    8.3<L>      03 Sep 2018 10:23  Phos  3.3     09-02  Mg     1.6     09-02

## 2018-09-04 NOTE — DISCHARGE NOTE ADULT - PROVIDER TOKENS
FAN:'[id number]',FAN:'[id number]' TOKEN:'[id number]',TOKEN:'[id number]',TOKEN:'[id number]' TOKEN:'[id number]',TOKEN:'[id number]',TOKEN:'[id number]' TOKEN:'[id number]',TOKEN:'[id number]',TOKEN:'[id number]'

## 2018-09-04 NOTE — DISCHARGE NOTE ADULT - PATIENT PORTAL LINK FT
You can access the [facility], offered by [facility], by registering with the following website: [email or web address]

## 2018-09-04 NOTE — DISCHARGE NOTE ADULT - MEDICATION SUMMARY - MEDICATIONS TO TAKE
I will START or STAY ON the medications listed below when I get home from the hospital:    predniSONE 2.5 mg oral tablet  -- 3 tab(s) by mouth once a day at 6 am  -- Indication: For Asthma    budesonide 0.5 mg/2 mL inhalation suspension  -- 2 milliliter(s) inhaled 2 times a day  -- Indication: For Asthma    HYDROmorphone 2 mg oral tablet  -- 1 tab(s) by mouth every 4 hours, As needed, breakthrough pain  -- Indication: For Pain    acetaminophen 160 mg/5 mL oral suspension  -- 20.31 milliliter(s) by mouth every 8 hours, As needed, Mild Pain (1 - 3)  -- Indication: For Pain    fentaNYL 12 mcg/hr transdermal film, extended release  -- 1 patch by transdermal patch every 72 hours  -- Indication: For Pain    clonazePAM 0.5 mg oral tablet  -- 3 tab(s) by mouth daily at 2230  -- Indication: For Anxiety    LORazepam 0.5 mg oral tablet  -- 1 tab(s) by mouth every 12 hours, As needed, Agitation  -- Indication: For Anxiety    mirtazapine 7.5 mg oral tablet  -- 1 tab(s) by mouth once a day (at bedtime)  -- Indication: For Anxiety    sertraline 50 mg oral tablet  -- 1 tab(s) by mouth once a day  -- Indication: For Anxiety    insulin isophane (NPH) 100 units/mL human recombinant subcutaneous suspension  -- 8 unit(s) subcutaneous daily at 6 am  -- Indication: For Hyperglycemia, drug-induced    clopidogrel 75 mg oral tablet  -- 1 tab(s) by mouth once a day (in the morning)  -- Indication: For Cva    QUEtiapine 25 mg oral tablet  -- 1 tab(s) by mouth once a day (at bedtime)  -- Indication: For Mood    Advair Diskus 500 mcg-50 mcg inhalation powder  -- 1 puff(s) inhaled once a day (in the morning)  -- Indication: For Asthma    Spiriva 18 mcg inhalation capsule  -- 1 cap(s) inhaled once a day (in the morning)  -- Indication: For Asthma    ipratropium-albuterol 0.5 mg-2.5 mg/3 mLinhalation solution  -- 3 milliliter(s) inhaled every 6 hours  -- Indication: For Asthma    lidocaine 5% topical film  -- Apply on skin to affected area once a day, As Needed  -- Indication: For Pain    nystatin 100,000 units/g topical powder  -- 1 application on skin 2 times a day, As needed, rash/itchiness  -- Indication: For rash    petrolatum topical ointment  -- 1 application on skin once a day  -- Indication: For Skin care    camphor-menthol 0.5%-0.5% topical lotion  -- 1 application on skin once a day  -- Indication: For Skin care    mupirocin 2% topical ointment  -- 1 application on skin 2 times a day  apply to both nares  -- Indication: For dry nares    ferrous sulfate 300 mg/5 mL (60 mg elemental iron) oral liquid  -- 5 milliliter(s) by mouth once a day  -- Indication: For Anemia    ocular lubricant ophthalmic solution  -- 1 drop(s) to each affected eye 3 times a day  -- Indication: For Dry eyes    Protonix 40 mg oral granule, delayed release  -- 1 each by PEG tube once a day  -- Indication: For Acid reflux    minocycline 100 mg oral capsule  -- 1 cap(s) by mouth 2 times a day  -- Indication: For Infection of prosthetic joint, initial encounter    ergocalciferol 8000 intl units/mL oral solution  -- 8000 unit(s) by mouth once a day  -- Indication: For Supplement

## 2018-09-04 NOTE — DISCHARGE NOTE ADULT - PLAN OF CARE
Resolution of infection, improved ADL's Please follow up with Dr. Rey on discharge from rehab (call for appointment).  PT- non weight bearing RLE, Flat foot weight bearing LLE.  Lovenox x 6 weeks total for dvt prevention.  Complete antibiotics as prescribed.  Please follow up with Dr. Yan for management of PEG tube, possible removal in future when tolerating food by mouth. Please follow up with Dr. Rey on discharge from rehab (call for appointment).  PT- non weight bearing Right lower extremity, Flat foot weight bearing Left lower extremity.  Lovenox x 6 weeks total for dvt prevention.  Complete antibiotics as prescribed. Completed course of intravenous antibiotics  Continue with Minocycline as prescribed Left. Your on Chronic Prednisone  Seen by Endocrine, continue with Insulin as prescribed Condition improved Please follow up with Dr. Yan for management of PEG tube, possible removal in future when tolerating food by mouth. Please follow up with Dr. Rey on discharge from rehab (call for appointment).  PT- non weight bearing Right lower extremity, Flat foot weight bearing Left lower extremity.  Lovenox x 6 weeks total for dvt prevention.  Complete antibiotics as prescribed.  Follow up with Dr. Arambula in 1 week Left.  Follow up with Dr. Rey Please follow up with Dr. Rey on discharge from rehab (call for appointment).  PT- weight bearing as tolerated on Right lower extremity, Partial weight bearing 50%, protected weight bearing and Flat foot weight bearing Left lower extremity.  Complete antibiotics as prescribed.  Follow up with Dr. Arambula-Plastic surgery in 1 week Please follow up with Dr. Yan Please follow up with Dr. Rey on discharge from rehab (call for appointment).    PT- weight bearing as tolerated on Right lower extremity, Partial weight bearing 50%, protected weight bearing and Flat foot weight bearing Left lower extremity.  Complete antibiotics as prescribed.  Follow up with Dr. Arambula-Plastic surgery in 2 weeks Completed course of intravenous antibiotics  Continue with Minocycline as prescribed  Follow up with Dr. Powell after rehab Your on Chronic Prednisone  continue with Insulin as prescribed Continue Peg feeds Please follow up with Dr. Rey on discharge from rehab (call for appointment).    PT- weight bearing as tolerated on Right lower extremity, Partial weight bearing 50%, protected weight bearing and Flat foot weight bearing Left lower extremity.    Follow up with Dr. Arambula-Plastic surgery in 2 weeks

## 2018-09-04 NOTE — DISCHARGE NOTE ADULT - INSTRUCTIONS
Resume tubefeeds as prescribed Pivot 1.5 @35cc/hr via Peg  Aspiration precaution Normal Saline wet to dry to right hip

## 2018-09-04 NOTE — DISCHARGE NOTE ADULT - CARE PROVIDER_API CALL
Rustam Rey), Orthopedics  [address] [facility] [address]  [place] [address]  Phone: [phone]  Fax: [phone]    Sidney Yan (DO), Gastroenterology; Internal Medicine  [address] [place] [address] [address] [place] [address]  Phone: [phone]  Fax: [phone] Rustam Rey), Orthopedics  [address] [facility]  [address]  [place] [address]  Phone: [phone]  Fax: [phone]    Sidney Yan (DO), Gastroenterology; Internal Medicine  [address] [place] [address] [address]  [place] [address]  Phone: [phone]  Fax: [phone]    Regan Arambula), Surgery  PlasticReconstruct  [address] [facility]  [address]  [place] [address]  Phone: [phone]  Fax: [phone] Rustam Rey), Orthopedics  [address] [facility]  [address]  [place] [address]  Phone: [phone]  Fax: [phone]    Regan Arambula), Surgery  PlasticReconstruct  [address] [facility]  [address]  [place] [address]  Phone: [phone]  Fax: [phone]    Deniz Bolden (), Family Medicine  [address] [place] [address]  Phone: [phone]  Fax: [phone] Rustam Rey), Orthopedics  [address] [facility]  [address]  [place] [address]  Phone: [phone]  Fax: [phone]    Regan Arambula), Surgery  PlasticReconstruct  [address] [facility]  [address]  [place] [address]  Phone: [phone]  Fax: [phone]    Rubén Powell), Internal Medicine  [address] [facility]  [address]  [place] [address]  Phone: [phone]  Fax: [phone]

## 2018-09-04 NOTE — PROGRESS NOTE ADULT - SUBJECTIVE AND OBJECTIVE BOX
---___---___---___---___---___---___ ---___---___---___---___---___---___---___---___---___---                    <<<  M E D I C A L   A T T E N D I N G    F O L L O W    U P   N O T E  >>>  pain in left leg new and has been having nasuea possibly due to feeds has been stable      ---___---___---___---___---___---      <<<  MEDICATIONS:  >>>    MEDICATIONS  (STANDING):  acetaminophen  IVPB. 1000 milliGRAM(s) IV Intermittent once  ALBUTerol/ipratropium for Nebulization 3 milliLiter(s) Nebulizer every 6 hours  buDESOnide   0.5 milliGRAM(s) Respule 0.5 milliGRAM(s) Inhalation every 12 hours  chlorhexidine 4% Liquid 1 Application(s) Topical <User Schedule>  clonazePAM Tablet 2 milliGRAM(s) Oral <User Schedule>  enoxaparin Injectable 40 milliGRAM(s) SubCutaneous daily  insulin lispro (HumaLOG) corrective regimen sliding scale   SubCutaneous every 6 hours  lidocaine   Patch 1 Patch Transdermal every 24 hours  mirtazapine 45 milliGRAM(s) Oral <User Schedule>  nystatin    Suspension 374714 Unit(s) Oral four times a day  pantoprazole  Injectable 40 milliGRAM(s) IV Push every 24 hours  predniSONE  Solution 10 milliGRAM(s) Enteral Tube every 24 hours  QUEtiapine 50 milliGRAM(s) Oral <User Schedule>  sertraline 50 milliGRAM(s) Oral daily  vancomycin  IVPB 500 milliGRAM(s) IV Intermittent every 24 hours      MEDICATIONS  (PRN):  nystatin Powder 1 Application(s) Topical two times a day PRN rash/itchiness       ---___---___---___---___---___---     <<<REVIEW OF SYSTEM: >>>    GEN: no fever, no chills, no pain  RESP: no SOB, no cough, no sputum  CVS: no chest pain, no palpitations, no edema  GI: no abdominal pain,  nausea, no vomiting, no constipation, no diarrhea  : no dysurea, no frequency  NEURO: no headache, no dizziness  PSYCH: no depression, not anxious  Derm : no itching, no rash     ---___---___---___---___---___---          <<<  VITAL SIGNS: >>>    T(F): 98.5 (09-04-18 @ 09:36), Max: 98.6 (09-03-18 @ 21:30)  HR: 108 (09-04-18 @ 09:36) (105 - 112)  BP: 155/91 (09-04-18 @ 09:36) (124/79 - 155/91)  RR: 17 (09-04-18 @ 09:36) (17 - 18)  SpO2: 100% (09-04-18 @ 09:36) (95% - 100%)  Wt(kg): --  CAPILLARY BLOOD GLUCOSE      POCT Blood Glucose.: 226 mg/dL (04 Sep 2018 06:55)    I&O's Summary    03 Sep 2018 07:01  -  04 Sep 2018 07:00  --------------------------------------------------------  IN: 765 mL / OUT: 775 mL / NET: -10 mL         ---___---___---___---___---___---                       PHYSICAL EXAM:    GEN: A&O X 3 , NAD , comfortable  HEENT: NCAT, PERRL, MMM, no scleral icterus, hearing intact  NECK: Supple, No JVD  CVS: S1S2 , regular , No M/R/G appreciated  PULM: CTA B/L,  no W/R/R appreciated  ABD.: soft. non tender, non distended,  bowel sounds present peg noted   Extrem: intact pulses , no edema noted  Derm: No rash or ecchymosis noted  PSYCH: normal mood, no depression, not anxious     ---___---___---___---___---___---     <<<  LAB AND IMAGING: >>>                          8.8    11.4  )-----------( 378      ( 03 Sep 2018 10:23 )             27.2               09-03    136  |  102  |  17  ----------------------------<  174<H>  4.1   |  24  |  0.78    Ca    8.3<L>      03 Sep 2018 10:23                                 [All pertinent / recent available Imaging reports and other labs reviewed]     ---___---___---___---___---___---___ ---___---___---___---___---           <<<  A S S E S S M E N T   A N D   P L A N :  >>>          -GI/DVT Prophylaxis.    --------------------------------------------  Case discussed with   Education given on     >>______________________<<      Deniz Bolden .         phone   [phone]

## 2018-09-04 NOTE — DISCHARGE NOTE ADULT - ADDITIONAL INSTRUCTIONS
Please follow up with Dr. Rey, and GI on discharge.  See your PCP on discharge from rehab. Please follow up with Dr. Rey, and Gastrointestinal doctor.  Follow up with your Primary care doctor after discharge from rehab. Please follow up with Dr. Rey, Dr. Arambula and Gastrointestinal doctor.  Follow up with your Primary care doctor after discharge from rehab. Please follow up with PMD, Dr. Rey, Dr. Arambula and Infectious disease  Follow up with your Primary care doctor after discharge from rehab.

## 2018-09-04 NOTE — DISCHARGE NOTE ADULT - HOSPITAL COURSE
This is a 68 year old female admitted to [facility] on 7/26/18 with lethargy, difficulty using bathroom and radiating back pain to hip.  Patient discovered to have L acetabulum fracture.  Patient was septic, with MRSA bacteremia, chills/rigors.  Patient had urgent MRI/CT scan, and started on broad-spectrum antibiotics.  Infectious disease was consulted, who recommended exploration of R hip hematoma via aspiration v. washout.  Ortho consulted, with plan for hip washout of hematoma.  Patient entered septic shock, and Rapid Response called.  Patient placed on pressors and transferred to SICU Septic shock.  Trauma consulted for poly trauma.  Patient placed with central line and arterial access.  Nephrology consulted for hyponatremia.  Patient underwent I+D R hip with spacer on 8/27/18.  Patient returned to SICU on pressors/intubated.  Patient managed in SICU, extubated on 8/30/18.  Podiatry consulted for heel ulcers, which they recommended offloading.  8/2/18-Patient re-intubated for respiratory distress.  Patient underwent I+D R hip with spacer on 8/4/18.  8/6/18 patient extubated.  8/7/18-Dental consulted for chipped tooth during intubation, with recommendations for outpatient dental follow up.  8/9/18-Speech and swallow evaluation performed, who recommended NPO for dysphagia.  8/15/18-patient extubated.  8/21/18-Patient re-evaluated with speech and swallow eval, recommended for MBS.  ENT consulted with no intervention recommended.  8/21/18-cardiology consulted for chest pain, who recommended monitoring.  8/22/1885-Io-vymyxoeyo for speech and swallow, recommended for NPO.  8/22/18-PICC line placed for antibiotic access.  8/23/18-GI consulted for PEG.  8/24/18-PEG tube placed.  8/28/18-Rapid response called again for fever and hypotension.  Patient transported back to SICU.  9/2/18-patient transported to [address] [place] [address]. This is a 68 year old female admitted to [facility] on 7/26/18 with lethargy, difficulty using bathroom and radiating back pain to hip.  Patient discovered to have L acetabulum fracture.  Patient was septic, with MRSA bacteremia, chills/rigors.  Patient had urgent MRI/CT scan, and started on broad-spectrum antibiotics.  Infectious disease was consulted, who recommended exploration of R hip hematoma via aspiration v. washout.  Ortho consulted, with plan for hip washout of hematoma.  Patient entered septic shock, and Rapid Response called.  Patient placed on pressors and transferred to SICU Septic shock.  Trauma consulted for poly trauma.  Patient placed with central line and arterial access.  Nephrology consulted for hyponatremia.  Patient underwent I+D R hip with spacer on 8/27/18.  Patient returned to SICU on pressors/intubated.  Patient managed in SICU, extubated on 8/30/18.  Podiatry consulted for heel ulcers, which they recommended offloading.  8/2/18-Patient re-intubated for respiratory distress.  Patient underwent I+D R hip with spacer on 8/4/18.  8/6/18 patient extubated.  8/7/18-Dental consulted for chipped tooth during intubation, with recommendations for outpatient dental follow up.  8/9/18-Speech and swallow evaluation performed, who recommended NPO for dysphagia.  8/15/18-patient extubated.  8/21/18-Patient re-evaluated with speech and swallow eval, recommended for MBS.  ENT consulted with no intervention recommended.  8/21/18-cardiology consulted for chest pain, who recommended monitoring.  8/22/1865-Cb-awzeuhmws for speech and swallow, recommended for NPO.  8/22/18-PICC line placed for antibiotic access.  8/23/18-GI consulted for PEG.  8/24/18-PEG tube placed.  8/28/18-Rapid response called again for fever and hypotension.  Patient transported back to SICU.  9/2/18-patient transported to [address] [place] [address]. Pt finished IV abx course. Pt to continue suppressive Minocycline. Fevers resolved. Pt to go to [facility] rehab for PT. This is a 68 year old female admitted to [facility] on 7/26/18 with lethargy, difficulty using bathroom and radiating back pain to hip.  Patient discovered to have L acetabulum fracture.  Patient was septic, with MRSA bacteremia, chills/rigors.  Patient had urgent MRI/CT scan, and started on broad-spectrum antibiotics.  Infectious disease was consulted, who recommended exploration of R hip hematoma via aspiration v. washout.  Ortho consulted, with plan for hip washout of hematoma.  Patient entered septic shock, and Rapid Response called.  Patient placed on pressors and transferred to SICU Septic shock.  Trauma consulted for poly trauma.  Patient placed with central line and arterial access.  Nephrology consulted for hyponatremia.  Patient underwent I+D R hip with spacer on 8/27/18.  Patient returned to SICU on pressors/intubated.  Patient managed in SICU, extubated on 8/30/18.  Podiatry consulted for heel ulcers, which they recommended offloading.  8/2/18-Patient re-intubated for respiratory distress.  Patient underwent I+D R hip with spacer on 8/4/18.  8/6/18 patient extubated.  8/7/18-Dental consulted for chipped tooth during intubation, with recommendations for outpatient dental follow up.  8/9/18-Speech and swallow evaluation performed, who recommended NPO for dysphagia.  8/15/18-patient extubated.  8/21/18-Patient re-evaluated with speech and swallow eval, recommended for MBS.  ENT consulted with no intervention recommended.  8/21/18-cardiology consulted for chest pain, who recommended monitoring.  8/22/1846-No-zeyqsgqbm for speech and swallow, recommended for NPO.  8/22/18-PICC line placed for antibiotic access.  8/23/18-GI consulted for PEG.  8/24/18-PEG tube placed.  8/28/18-Rapid response called again for fever and hypotension.  Patient transported back to SICU.  9/2/18-patient transported to [address] [place] [address]. Pt finished IV abx course. Pt to continue suppressive Minocycline. Fevers resolved. Pt to go to rehab for PT. This is a 68 year old female admitted to [facility] on 7/26/18 with lethargy, difficulty using bathroom and radiating back pain to hip.  Patient discovered to have L acetabulum fracture.  Patient was septic, with MRSA bacteremia, chills/rigors.  Patient had urgent MRI/CT scan, and started on broad-spectrum antibiotics.  Infectious disease was consulted, who recommended exploration of R hip hematoma via aspiration v. washout.  Ortho consulted, with plan for hip washout of hematoma.  Patient entered septic shock, and Rapid Response called.  Patient placed on pressors and transferred to SICU Septic shock.  Trauma consulted for poly trauma.  Patient placed with central line and arterial access.  Nephrology consulted for hyponatremia.  Patient underwent I+D R hip with spacer on 8/27/18.  Patient returned to SICU on pressors/intubated.  Patient managed in SICU, extubated on 8/30/18.  Podiatry consulted for heel ulcers, which they recommended offloading.  8/2/18-Patient re-intubated for respiratory distress.  Patient underwent I+D R hip with spacer on 8/4/18.  8/6/18 patient extubated.  8/7/18-Dental consulted for chipped tooth during intubation, with recommendations for outpatient dental follow up.  8/9/18-Speech and swallow evaluation performed, who recommended NPO for dysphagia.  8/15/18-patient extubated.  8/21/18-Patient re-evaluated with speech and swallow eval, recommended for MBS.  ENT consulted with no intervention recommended.  8/21/18-cardiology consulted for chest pain, who recommended monitoring.  8/22/1889-Lk-tudjlpqhe for speech and swallow, recommended for NPO.  8/22/18-PICC line placed for antibiotic access.  8/23/18-GI consulted for PEG.  8/24/18-PEG tube placed.  8/28/18-Rapid response called again for fever and hypotension.  Patient transported back to SICU.  9/2/18-patient transported to [address] [place] [address]. Pt finished IV abx course. Pt to continue suppressive Minocycline. Fevers resolved. Pt to go to rehab for PT. Outpatient follow up with PMD, Orthopedic, Plastics and Infectious disease.

## 2018-09-04 NOTE — PROGRESS NOTE ADULT - SUBJECTIVE AND OBJECTIVE BOX
CC: f/u for MRSA bacteremia, infected R hip, she is receiving treatment     Patient in bed she is awake in bed, no fever     REVIEW OF SYSTEMS:  All other review of systems negative (Comprehensive ROS)- limited    Antimicrobials:  cefepime   IVPB 1000 milliGRAM(s) IV Intermittent every 24 hours  cefepime   IVPB      erythromycin    ethylsuccinate Suspension 40 mG/mL 500 milliGRAM(s) Oral every 8 hours  metroNIDAZOLE  IVPB      metroNIDAZOLE  IVPB 500 milliGRAM(s) IV Intermittent every 8 hours  vancomycin  IVPB 500 milliGRAM(s) IV Intermittent every 24 hours    Other Medications Reviewed    Vital Signs Last 24 Hrs  T(C): 36.9 (04 Sep 2018 09:36), Max: 37.1 (03 Sep 2018 10:45)  T(F): 98.5 (04 Sep 2018 09:36), Max: 98.8 (03 Sep 2018 10:45)  HR: 108 (04 Sep 2018 09:36) (105 - 112)  BP: 155/91 (04 Sep 2018 09:36) (124/79 - 155/91)  BP(mean): --  RR: 17 (04 Sep 2018 09:36) (17 - 18)  SpO2: 100% (04 Sep 2018 09:36) (95% - 100%)    PHYSICAL EXAM:  General: no acute distress  Eyes:  anicteric, no conjunctival injection, no discharge  Oropharynx: no lesions or injection 	  Neck: supple, without adenopathy  Lungs: decreased BS bases  Heart: regular rate and rhythm; no murmur, rubs or gallops  Abdomen: soft, nondistended, nontender, G tube site clean  Skin: no lesions  Extremities: no edema.  R hip wound dressings intact.  L PICC site clean  Neurologic: more alert/awake, moves all extremities    LAB RESULTS:                                         8.8    11.4  )-----------( 378      ( 03 Sep 2018 10:23 )             27.2                           8.8    9.78  )-----------( 299      ( 02 Sep 2018 10:09 )             27.9   09-02    136  |  105  |  18  ----------------------------<  161<H>  4.4   |  21<L>  |  0.86    Ca    8.3<L>      02 Sep 2018 07:39  Phos  3.3     09-02  Mg     1.6     09-02    Vancomycin Level, Trough (09.02.18 @ 07:39)    Vancomycin Level, Trough: 15.5    Procalcitonin  08-31-18 @ 02:34   -  27.09      MICROBIOLOGY:  RECENT CULTURES:  Culture - Blood (08.28.18 @ 05:35)    Specimen Source: .Blood Blood-Peripheral    Culture Results:   No growth to date.    08-26 @ 23:03 .Blood Blood     No growth to date.      RADIOLOGY REVIEWED:  < from: Xray Chest 1 View- PORTABLE-Routine (09.01.18 @ 07:29) >  Left effusion and left basilar atelectasis. Right upper lobe patchy and   linear opacities, unchanged.    CT Abdomen and Pelvis No Cont (08.27.18 @ 09:35) >  No acute intra abdominal pathology or collection.  Left acetabular fracture as at recent prior imaging.

## 2018-09-04 NOTE — DISCHARGE NOTE ADULT - MEDICATION SUMMARY - MEDICATIONS TO STOP TAKING
I will STOP taking the medications listed below when I get home from the hospital:    oxyCODONE 10 mg oral tablet  -- 1 tab(s) by mouth every 4 to 6 hours, As Needed    Zyflo 600 mg oral tablet  -- 2 tab(s) by mouth 2 times a day    Aleve 220 mg oral tablet  -- 1 tab(s) by mouth 2 times a day    Melatonin 3 mg oral tablet  -- 1 tab(s) by mouth once a day (in the evening)    docusate sodium 100 mg oral capsule  -- 1 cap(s) by mouth once a day, As Needed    Senna 8.6 mg oral tablet  -- 1 tab(s) by mouth once a day, As Needed    Chelated Magnesium 100 mg oral tablet  -- 1 tab(s) by mouth once a day (in the morning)    Pepcid OTC  -- 20 milligram(s) by mouth 2 times a day    luxlyte awake drops 4.75mg thc and 0.25mg cbd/1mL  -- 1 milliliter(s)  3 times a day    triamterene-hydrochlorothiazide 37.5 mg-25 mg oral tablet  -- 1 tab(s) by mouth once a day

## 2018-09-04 NOTE — PROGRESS NOTE ADULT - ASSESSMENT
left leg pain   anemia   anxiety   right hip fracture   dehiscence of wound       continue to monitor cbc and h/h  for drop ion wbc . if needed restart iron via peg

## 2018-09-04 NOTE — PROGRESS NOTE ADULT - ASSESSMENT
A/P: 68y Female s/p R hip PJI explant and placement of cement spacer with distal femur ORIF, POD 30; Stable  -dressing changed at bedside  -Pain control  -DVT ppx; Encourage IS  -check labs:   Transfuse IF Hgb <8 (as per MED)  -NWB RLE; Flat foot weight bearing LLE  -Continue Abx: ID note appreciated

## 2018-09-04 NOTE — DISCHARGE NOTE ADULT - CARE PROVIDERS DIRECT ADDRESSES
,[email or web address] ,[email or web address],DirectAddress_Unknown

## 2018-09-04 NOTE — DISCHARGE NOTE ADULT - REASON FOR ADMISSION
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

## 2018-09-04 NOTE — DISCHARGE NOTE ADULT - NSTOBACCOHOTLINE_GEN_A_NCS
[facility] Smokers Quitline ([email or web address]) [facility] Smokers Quitline ([phone])

## 2018-09-04 NOTE — DISCHARGE NOTE ADULT - SECONDARY DIAGNOSIS.
Sepsis due to methicillin resistant Staphylococcus aureus (MRSA) Acetabular fracture Hyperglycemia, drug-induced Hyponatremia Dysphagia

## 2018-09-04 NOTE — DISCHARGE NOTE ADULT - MEDICATION SUMMARY - MEDICATIONS TO CHANGE
I will SWITCH the dose or number of times a day I take the medications listed below when I get home from the hospital:    mirtazapine 45 mg oral tablet  -- 1 tab(s) by mouth once a day    QUEtiapine 50 mg oral tablet  -- 1 tab(s) by mouth once a day (at bedtime)    clonazePAM 1 mg oral tablet  -- 2 tab(s) by mouth once a day (in the evening)    Tylenol Extra Strength 500 mg oral tablet  -- 2 tab(s) by mouth 2 times a day    predniSONE 10 mg oral tablet  -- 1 tab(s) by mouth once a day (in the morning)    mirtazapine 15 mg oral tablet  -- 3 tab(s) by mouth once a day (at bedtime)

## 2018-09-05 DIAGNOSIS — R73.9 HYPERGLYCEMIA, UNSPECIFIED: ICD-10-CM

## 2018-09-05 LAB
ANION GAP SERPL CALC-SCNC: 9 MMOL/L — SIGNIFICANT CHANGE UP (ref 5–17)
BUN SERPL-MCNC: 21 MG/DL — SIGNIFICANT CHANGE UP (ref 7–23)
CALCIUM SERPL-MCNC: 8.1 MG/DL — LOW (ref 8.4–10.5)
CHLORIDE SERPL-SCNC: 103 MMOL/L — SIGNIFICANT CHANGE UP (ref 96–108)
CO2 SERPL-SCNC: 27 MMOL/L — SIGNIFICANT CHANGE UP (ref 22–31)
CREAT SERPL-MCNC: 0.62 MG/DL — SIGNIFICANT CHANGE UP (ref 0.5–1.3)
GLUCOSE BLDC GLUCOMTR-MCNC: 104 MG/DL — HIGH (ref 70–99)
GLUCOSE BLDC GLUCOMTR-MCNC: 129 MG/DL — HIGH (ref 70–99)
GLUCOSE BLDC GLUCOMTR-MCNC: 139 MG/DL — HIGH (ref 70–99)
GLUCOSE BLDC GLUCOMTR-MCNC: 229 MG/DL — HIGH (ref 70–99)
GLUCOSE SERPL-MCNC: 137 MG/DL — HIGH (ref 70–99)
HCT VFR BLD CALC: 26.1 % — LOW (ref 34.5–45)
HGB BLD-MCNC: 8.4 G/DL — LOW (ref 11.5–15.5)
MCHC RBC-ENTMCNC: 29.3 PG — SIGNIFICANT CHANGE UP (ref 27–34)
MCHC RBC-ENTMCNC: 32.3 GM/DL — SIGNIFICANT CHANGE UP (ref 32–36)
MCV RBC AUTO: 90.8 FL — SIGNIFICANT CHANGE UP (ref 80–100)
PLATELET # BLD AUTO: 407 K/UL — HIGH (ref 150–400)
POTASSIUM SERPL-MCNC: 4 MMOL/L — SIGNIFICANT CHANGE UP (ref 3.5–5.3)
POTASSIUM SERPL-SCNC: 4 MMOL/L — SIGNIFICANT CHANGE UP (ref 3.5–5.3)
RBC # BLD: 2.88 M/UL — LOW (ref 3.8–5.2)
RBC # FLD: 14 % — SIGNIFICANT CHANGE UP (ref 10.3–14.5)
SODIUM SERPL-SCNC: 139 MMOL/L — SIGNIFICANT CHANGE UP (ref 135–145)
VANCOMYCIN TROUGH SERPL-MCNC: 12.3 UG/ML — SIGNIFICANT CHANGE UP (ref 10–20)
WBC # BLD: 12.2 K/UL — HIGH (ref 3.8–10.5)
WBC # FLD AUTO: 12.2 K/UL — HIGH (ref 3.8–10.5)

## 2018-09-05 PROCEDURE — 73551 X-RAY EXAM OF FEMUR 1: CPT | Mod: 26,LT

## 2018-09-05 PROCEDURE — 99222 1ST HOSP IP/OBS MODERATE 55: CPT

## 2018-09-05 RX ORDER — DEXTROSE 50 % IN WATER 50 %
25 SYRINGE (ML) INTRAVENOUS ONCE
Qty: 0 | Refills: 0 | Status: DISCONTINUED | OUTPATIENT
Start: 2018-09-05 | End: 2018-10-04

## 2018-09-05 RX ORDER — INSULIN LISPRO 100/ML
VIAL (ML) SUBCUTANEOUS EVERY 6 HOURS
Qty: 0 | Refills: 0 | Status: DISCONTINUED | OUTPATIENT
Start: 2018-09-05 | End: 2018-09-11

## 2018-09-05 RX ORDER — SODIUM CHLORIDE 9 MG/ML
1000 INJECTION, SOLUTION INTRAVENOUS
Qty: 0 | Refills: 0 | Status: DISCONTINUED | OUTPATIENT
Start: 2018-09-05 | End: 2018-10-04

## 2018-09-05 RX ORDER — GLUCAGON INJECTION, SOLUTION 0.5 MG/.1ML
1 INJECTION, SOLUTION SUBCUTANEOUS ONCE
Qty: 0 | Refills: 0 | Status: DISCONTINUED | OUTPATIENT
Start: 2018-09-05 | End: 2018-10-04

## 2018-09-05 RX ORDER — ACETAMINOPHEN 500 MG
1000 TABLET ORAL EVERY 8 HOURS
Qty: 0 | Refills: 0 | Status: COMPLETED | OUTPATIENT
Start: 2018-09-05 | End: 2018-09-05

## 2018-09-05 RX ORDER — HYDROMORPHONE HYDROCHLORIDE 2 MG/ML
0.5 INJECTION INTRAMUSCULAR; INTRAVENOUS; SUBCUTANEOUS ONCE
Qty: 0 | Refills: 0 | Status: DISCONTINUED | OUTPATIENT
Start: 2018-09-05 | End: 2018-09-05

## 2018-09-05 RX ORDER — HUMAN INSULIN 100 [IU]/ML
6 INJECTION, SUSPENSION SUBCUTANEOUS AT BEDTIME
Qty: 0 | Refills: 0 | Status: DISCONTINUED | OUTPATIENT
Start: 2018-09-05 | End: 2018-09-05

## 2018-09-05 RX ORDER — DEXTROSE 50 % IN WATER 50 %
12.5 SYRINGE (ML) INTRAVENOUS ONCE
Qty: 0 | Refills: 0 | Status: DISCONTINUED | OUTPATIENT
Start: 2018-09-05 | End: 2018-10-04

## 2018-09-05 RX ORDER — ACETAMINOPHEN 500 MG
500 TABLET ORAL ONCE
Qty: 0 | Refills: 0 | Status: COMPLETED | OUTPATIENT
Start: 2018-09-05 | End: 2018-09-05

## 2018-09-05 RX ORDER — HUMAN INSULIN 100 [IU]/ML
5 INJECTION, SUSPENSION SUBCUTANEOUS AT BEDTIME
Qty: 0 | Refills: 0 | Status: DISCONTINUED | OUTPATIENT
Start: 2018-09-05 | End: 2018-09-10

## 2018-09-05 RX ORDER — DEXTROSE 50 % IN WATER 50 %
15 SYRINGE (ML) INTRAVENOUS ONCE
Qty: 0 | Refills: 0 | Status: DISCONTINUED | OUTPATIENT
Start: 2018-09-05 | End: 2018-10-04

## 2018-09-05 RX ADMIN — Medication 400 MILLIGRAM(S): at 23:10

## 2018-09-05 RX ADMIN — MIRTAZAPINE 45 MILLIGRAM(S): 45 TABLET, ORALLY DISINTEGRATING ORAL at 23:08

## 2018-09-05 RX ADMIN — Medication 10 MILLIGRAM(S): at 00:34

## 2018-09-05 RX ADMIN — Medication 0.5 MILLIGRAM(S): at 00:34

## 2018-09-05 RX ADMIN — LIDOCAINE 1 PATCH: 4 CREAM TOPICAL at 12:36

## 2018-09-05 RX ADMIN — Medication 500000 UNIT(S): at 00:34

## 2018-09-05 RX ADMIN — Medication 0.5 MILLIGRAM(S): at 23:12

## 2018-09-05 RX ADMIN — Medication 500000 UNIT(S): at 06:29

## 2018-09-05 RX ADMIN — HYDROMORPHONE HYDROCHLORIDE 0.5 MILLIGRAM(S): 2 INJECTION INTRAMUSCULAR; INTRAVENOUS; SUBCUTANEOUS at 01:42

## 2018-09-05 RX ADMIN — NYSTATIN CREAM 1 APPLICATION(S): 100000 CREAM TOPICAL at 12:14

## 2018-09-05 RX ADMIN — HYDROMORPHONE HYDROCHLORIDE 0.5 MILLIGRAM(S): 2 INJECTION INTRAMUSCULAR; INTRAVENOUS; SUBCUTANEOUS at 00:34

## 2018-09-05 RX ADMIN — QUETIAPINE FUMARATE 50 MILLIGRAM(S): 200 TABLET, FILM COATED ORAL at 23:10

## 2018-09-05 RX ADMIN — Medication 2 MILLIGRAM(S): at 23:09

## 2018-09-05 RX ADMIN — Medication 3 MILLILITER(S): at 06:29

## 2018-09-05 RX ADMIN — Medication 3 MILLILITER(S): at 23:12

## 2018-09-05 RX ADMIN — Medication 0.5 MILLIGRAM(S): at 12:14

## 2018-09-05 RX ADMIN — PANTOPRAZOLE SODIUM 40 MILLIGRAM(S): 20 TABLET, DELAYED RELEASE ORAL at 12:14

## 2018-09-05 RX ADMIN — Medication 500 MILLIGRAM(S): at 15:45

## 2018-09-05 RX ADMIN — Medication 1000 MILLIGRAM(S): at 23:40

## 2018-09-05 RX ADMIN — Medication 100 MILLIGRAM(S): at 08:46

## 2018-09-05 RX ADMIN — Medication 3 MILLILITER(S): at 12:13

## 2018-09-05 RX ADMIN — SERTRALINE 50 MILLIGRAM(S): 25 TABLET, FILM COATED ORAL at 12:14

## 2018-09-05 RX ADMIN — Medication 3 MILLILITER(S): at 18:53

## 2018-09-05 RX ADMIN — Medication 200 MILLIGRAM(S): at 15:30

## 2018-09-05 RX ADMIN — ENOXAPARIN SODIUM 40 MILLIGRAM(S): 100 INJECTION SUBCUTANEOUS at 12:14

## 2018-09-05 RX ADMIN — LIDOCAINE 1 PATCH: 4 CREAM TOPICAL at 23:09

## 2018-09-05 RX ADMIN — Medication 4: at 07:06

## 2018-09-05 RX ADMIN — Medication 3 MILLILITER(S): at 00:34

## 2018-09-05 RX ADMIN — CHLORHEXIDINE GLUCONATE 1 APPLICATION(S): 213 SOLUTION TOPICAL at 06:30

## 2018-09-05 RX ADMIN — Medication 500000 UNIT(S): at 23:12

## 2018-09-05 NOTE — CONSULT NOTE ADULT - SUBJECTIVE AND OBJECTIVE BOX
HPI:  67 y/o F with no prior hx of diabetes. Never been told of hyperglycemia or elevated glucose values. No family hx of diabetes. Denies polyuria, polydipsia, nausea, vomiting, blurry vision, paresthesias. + weight loss. On prednsione 10mg daily. Does not check glucose at home. No reported hypoglycemia or symptoms of hypoglycemia. Does not eat much carbs or sweets. No soda or juice. Currently on 24 hour tube feeds.   Also hx of asthma, anxiety/depression, HLD, UC, pHTN presents with weakness.  She was recently hospitalized here for a right hip fracture s/p repair last month.  She is brought in by her family who say that on Monday she had the staples removed from her hip surgery.  She was 2 weeks ago developed a hematoma on the right hip after using Plavix with Lovenox for PPX after orhto surgery so this was stopped.  She was doing fine this week until last night when she said that she had to go to the bathroom "30 times" per the  and could not urinate or have BM.  She kept trying to get out of bed and walk to the bathroom and she usually walks with a walker with some assistance but last night she actuely became weak and not able to walk with walker and 2 person assist.  She also was brought to the ED 2 weeks ago for new onset severe back pain with radiation to her left hip.  Her  says this presentation was similar to when she broke her right hip last month and was concerned again but after ED visit said she did not break her hip.  She has been taking her Oxycodone since the surgery Q4hrs w/o pain relief.  She developed rigors, did not take temperature, urinary frequency, increased confusion and agitation along with back pain and LE weakness so she was brought days.      PAST MEDICAL & SURGICAL HISTORY:  CVA (cerebral vascular accident)  Fatty pancreas  PNA (pneumonia)  Pulmonary HTN  IGT (impaired glucose tolerance)  Ulcerative colitis  Acid reflux  Anxiety  Depression  Mouth sores  HLD (hyperlipidemia)  Asthma  Humeral head fracture  H/O: hysterectomy  H/O cataract extraction, left  History of knee replacement      FAMILY HISTORY:  Family history of dementia (Grandparent)  Family history of colon cancer (Grandparent)      Social History: No tobacco or alcohol use    Outpatient Medications:  · 	oxyCODONE 5 mg oral capsule: 1 tab(s) orally every 6 hours, As Needed -for moderate pain MDD:4  · 	enoxaparin 40 mg/0.4 mL injectable solution: 40 milligram(s) subcutaneously once a day   · 	QUEtiapine 50 mg oral tablet: 1 tab(s) orally once a day (at bedtime)  · 	sertraline 50 mg oral tablet: 1 tab(s) orally once a day  · 	mirtazapine 45 mg oral tablet: 1 tab(s) orally once a day  · 	enoxaparin: 40 milligram(s) subcutaneous once a day, for 6 weeks post op  · 	oxyCODONE: 5 milligram(s) orally every 4 hours, As Needed moderate pain  · 	acetaminophen 325 mg oral tablet: 2 tab(s) orally every 6 hours, As needed, Mild Pain (1 - 3)  · 	acetaminophen 325 mg oral tablet: 2 tab(s) orally every 6 hours, As needed, For Temp greater than 38 C (100.4 F)  · 	bisacodyl 10 mg rectal suppository: 1 suppository(ies) rectal once a day, As needed, Constipation  · 	famotidine 20 mg oral tablet: 1 tab(s) orally once a day  · 	clopidogrel 75 mg oral tablet: 1 tab(s) orally once a day  · 	Advair Diskus 500 mcg-50 mcg inhalation powder: 2 puff(s) inhaled 2 times a day  · 	predniSONE 10 mg oral tablet: 1 tab(s) orally once a day  · 	KlonoPIN 0.5 mg oral tablet: 1 tab(s) orally once a day in the morning  · 	Spiriva 18 mcg inhalation capsule: 1 cap(s) inhaled once a day  · 	KlonoPIN 0.5 mg oral tablet: 2 tab(s) orally once a day at night  · 	Zyflo 600 mg oral tablet: 1 tab(s) orally 4 times a day    MEDICATIONS  (STANDING):  ALBUTerol/ipratropium for Nebulization 3 milliLiter(s) Nebulizer every 6 hours  buDESOnide   0.5 milliGRAM(s) Respule 0.5 milliGRAM(s) Inhalation every 12 hours  chlorhexidine 4% Liquid 1 Application(s) Topical <User Schedule>  clonazePAM Tablet 2 milliGRAM(s) Oral <User Schedule>  dextrose 5%. 1000 milliLiter(s) (50 mL/Hr) IV Continuous <Continuous>  dextrose 50% Injectable 12.5 Gram(s) IV Push once  dextrose 50% Injectable 25 Gram(s) IV Push once  dextrose 50% Injectable 25 Gram(s) IV Push once  enoxaparin Injectable 40 milliGRAM(s) SubCutaneous daily  insulin lispro (HumaLOG) corrective regimen sliding scale   SubCutaneous every 6 hours  insulin NPH human recombinant 6 Unit(s) SubCutaneous at bedtime  lidocaine   Patch 1 Patch Transdermal every 24 hours  mirtazapine 45 milliGRAM(s) Oral <User Schedule>  nystatin    Suspension 430917 Unit(s) Oral four times a day  pantoprazole  Injectable 40 milliGRAM(s) IV Push every 24 hours  predniSONE  Solution 10 milliGRAM(s) Enteral Tube every 24 hours  QUEtiapine 50 milliGRAM(s) Oral <User Schedule>  sertraline 50 milliGRAM(s) Oral daily  vancomycin  IVPB 500 milliGRAM(s) IV Intermittent every 24 hours    MEDICATIONS  (PRN):  acetaminophen  IVPB .. 1000 milliGRAM(s) IV Intermittent every 8 hours PRN Mild Pain (1 - 3), Moderate Pain (4 - 6), Severe Pain (7 - 10)  dextrose 40% Gel 15 Gram(s) Oral once PRN Blood Glucose LESS THAN 70 milliGRAM(s)/deciLiter  glucagon  Injectable 1 milliGRAM(s) IntraMuscular once PRN Glucose <70 milliGRAM(s)/deciLiter  nystatin Powder 1 Application(s) Topical two times a day PRN rash/itchiness      Allergies    ASA; dye contrast (Anaphylaxis)  aspirin (Short breath)  divalproex sodium (Other (Unknown))  Haldol (Other (Unknown))  penicillin (Short breath; Rash)  sulfa drugs (Short breath; Rash)  Xanax (Other (Unknown))    Intolerances      Review of Systems:  Constitutional: No fever, +weight loss  Eyes: No blurry vision  Neuro: No headache, No paresthesias  HEENT: No throat pain  Cardiovascular: No chest pain  Respiratory: +occasional SOB  GI: No nausea or vomiting  : No polyuria  Skin: no rash  Psych: no depression  Endocrine: No polydipsia, No heat or cold intolerance, rest as noted in HPI  Hem/lymph: no swelling    All other review of systems negative      PHYSICAL EXAM:  VITALS: T(C): 36.5 (09-05-18 @ 17:04)  T(F): 97.7 (09-05-18 @ 17:04), Max: 98.5 (09-05-18 @ 00:24)  HR: 108 (09-05-18 @ 17:04) (100 - 113)  BP: 126/76 (09-05-18 @ 17:04) (126/76 - 154/86)  RR:  (16 - 17)  SpO2:  (94% - 100%)  Wt(kg): --  GENERAL: NAD at this time, cachectic  EYES: No proptosis, EOMI  HEENT:  Atraumatic, Normocephalic,   THYROID: Normal size, no palpable nodules  RESPIRATORY: full excursion, non-labored  CARDIOVASCULAR: Regular rhythm; No murmurs  GI: Soft, nontender, non distended, normal bowel sounds  SKIN: Dry, intact, No rashes or lesions  MUSCULOSKELETAL: muscle atrophy  NEURO: follows commands  PSYCH: Alert and oriented x 3, normal affect, normal mood  CUSHING'S SIGNS: no striae      POCT Blood Glucose.: 104 mg/dL (09-05-18 @ 18:05)  POCT Blood Glucose.: 139 mg/dL (09-05-18 @ 12:30)  POCT Blood Glucose.: 229 mg/dL (09-05-18 @ 06:35)  POCT Blood Glucose.: 129 mg/dL (09-05-18 @ 00:19)  POCT Blood Glucose.: 99 mg/dL (09-04-18 @ 18:19)  POCT Blood Glucose.: 98 mg/dL (09-04-18 @ 18:00)  POCT Blood Glucose.: 151 mg/dL (09-04-18 @ 12:55)  POCT Blood Glucose.: 226 mg/dL (09-04-18 @ 06:55)  POCT Blood Glucose.: 111 mg/dL (09-04-18 @ 00:23)  POCT Blood Glucose.: 77 mg/dL (09-03-18 @ 17:59)  POCT Blood Glucose.: 151 mg/dL (09-03-18 @ 11:36)  POCT Blood Glucose.: 202 mg/dL (09-03-18 @ 06:31)  POCT Blood Glucose.: 106 mg/dL (09-03-18 @ 00:23)                              8.4    12.2  )-----------( 407      ( 05 Sep 2018 13:08 )             26.1       09-05    139  |  103  |  21  ----------------------------<  137<H>  4.0   |  27  |  0.62    EGFR if : 107  EGFR if non : 93    Ca    8.1<L>      09-05      Thyroid Function Tests:      Hemoglobin A1C, Whole Blood: 5.5 % [4.0 - 5.6] (07-27-18 @ 09:02)        Radiology:

## 2018-09-05 NOTE — PROGRESS NOTE ADULT - ASSESSMENT
67 yo female with dementia, history of UC, and s/p R Hip hemiarthroplasty 6/22/18, post op hematoma  Admitted after left hip fx, noted severe sepsis, MRSA bacteremia, infected R hip- s/p GABRIEL and spacer  Stormy post op course with subsequent episode of sepsis- unclear source, resolved   S/p PEG and febrile again, transient low bp with no source apparent, again.   Covered with Cefepime and Flagyl, along with same Vanco  Remains afebrile, surveillance Bld Cxs negative, CT ABD negative, CXR no consolidation, WBC normal  Now out of ICU  Much improved procalcitonin level 27------>3  she completed a seven day course of cefepime and flagyl for sepsis unclear source sepsis has resolved   last  vanco was therapeutic at 15    Plan:  1.	Continue vanco (Day 32 of 42) MRSA tx   2.	will order cbc and chem for am labs   3.	continue supportive care as per ortho   4.	reviewed tx plan with pmd at the bedside

## 2018-09-05 NOTE — CONSULT NOTE ADULT - PROBLEM SELECTOR RECOMMENDATION 9
- No acute surgical intervention  - Recommend BID Wet-to-dry dressing changes  - ok to apply betadine around the wound edges  - Nutritional optimization is critical for wound healing  - If there is concern for a deeper infection, would recommend u/s v CT  - Should the wound require reconstruction in the future, will be available
Hyperglycemia occuring overnight likely from overnight tube feeds in addition to prednisone dose. Recommend NPH 5 units at bedtime only if she is getting the prednisone 10mg. No need for insulin during the day as her glucose values are at goal. Optimize nutrition status. Goal glucose 100-180. Continue low dose correction scale q6 while on 24 hour tube feeds. Will adjust as needed.
Multifactorial in the setting of pain, poor oral intake, urine osm of 299, urin Na 27 after treatment which make less reliable. But seems ADH appropriate response.   Na now 129, Target achieved over past 24 hours.   Agreed to continue d5/NS for now, but need bmp every 4 to 6 hours to monitor Na. If sodium trending down stop D5 and continue NS.  Target for tomorrow will be 135  Make sure drips or medication are mixed with NS rather that D5w.  Check AM cortisol and TSH
No Acute ENT intervention  Diet per S&S reccs  Reconsult prn

## 2018-09-05 NOTE — CHART NOTE - NSCHARTNOTEFT_GEN_A_CORE
RLE lateral thigh dsg CDI; wet-to-dry dsg performed bedside with betadine and saline wash. Proximal incision well-healing, distally 5cm opening with granulation tissue formation unchanged.  No purulence and/or discharge noted; New abds applied. Pt jimy well.  -Cont current tx     Cathy Lozano PA-C  Orthopedic Surgery  Pagers [phone]

## 2018-09-05 NOTE — PROGRESS NOTE ADULT - SUBJECTIVE AND OBJECTIVE BOX
CC: f/u for MRSA bacteremia, infected R hip, she is receiving treatment     Patient in bed she is currently resting in bed     REVIEW OF SYSTEMS:  All other review of systems negative (Comprehensive ROS)- limited    Antimicrobials:  cefepime   IVPB 1000 milliGRAM(s) IV Intermittent every 24 hours  cefepime   IVPB      erythromycin    ethylsuccinate Suspension 40 mG/mL 500 milliGRAM(s) Oral every 8 hours  metroNIDAZOLE  IVPB      metroNIDAZOLE  IVPB 500 milliGRAM(s) IV Intermittent every 8 hours  vancomycin  IVPB 500 milliGRAM(s) IV Intermittent every 24 hours    Other Medications Reviewed    Vital Signs Last 24 Hrs  T(C): 36.9 (05 Sep 2018 00:24), Max: 36.9 (05 Sep 2018 00:24)  T(F): 98.5 (05 Sep 2018 00:24), Max: 98.5 (05 Sep 2018 00:24)  HR: 113 (05 Sep 2018 00:24) (100 - 114)  BP: 154/86 (05 Sep 2018 00:24) (127/75 - 154/86)  BP(mean): --  RR: 17 (05 Sep 2018 00:24) (16 - 17)  SpO2: 98% (05 Sep 2018 00:24) (93% - 98%)    PHYSICAL EXAM:  General: no acute distress  Eyes:  anicteric, no conjunctival injection, no discharge  Oropharynx: no lesions or injection 	  Neck: supple, without adenopathy  Lungs: decreased BS bases  Heart: regular rate and rhythm; no murmur, rubs or gallops  Abdomen: soft, nondistended, nontender, G tube site clean  Skin: no lesions  Extremities: no edema.  R hip wound dressings intact.  L PICC site clean  Neurologic: more alert/awake, moves all extremities    LAB RESULTS:                   No new labs for today                         8.8    11.4  )-----------( 378      ( 03 Sep 2018 10:23 )             27.2                             8.8    9.78  )-----------( 299      ( 02 Sep 2018 10:09 )             27.9   09-02    136  |  105  |  18  ----------------------------<  161<H>  4.4   |  21<L>  |  0.86    Ca    8.3<L>      02 Sep 2018 07:39  Phos  3.3     09-02  Mg     1.6     09-02    Vancomycin Level, Trough (09.02.18 @ 07:39)    Vancomycin Level, Trough: 15.5    Procalcitonin  08-31-18 @ 02:34   -  27.09      MICROBIOLOGY:  RECENT CULTURES:  Culture - Blood (08.28.18 @ 05:35)    Specimen Source: .Blood Blood-Peripheral    Culture Results:   No growth to date.    08-26 @ 23:03 .Blood Blood     No growth to date.      RADIOLOGY REVIEWED:  < from: Xray Chest 1 View- PORTABLE-Routine (09.01.18 @ 07:29) >  Left effusion and left basilar atelectasis. Right upper lobe patchy and   linear opacities, unchanged.    CT Abdomen and Pelvis No Cont (08.27.18 @ 09:35) >  No acute intra abdominal pathology or collection.  Left acetabular fracture as at recent prior imaging.

## 2018-09-05 NOTE — PROGRESS NOTE ADULT - ASSESSMENT
acetabular fracture    anemia    mrsa sepsis       alzheimers dementia       will likely stay for the remainder of the iv abx . patient unable to weight bear and needs pt as well ot   wound care for the sacral ulcer

## 2018-09-05 NOTE — PROGRESS NOTE ADULT - SUBJECTIVE AND OBJECTIVE BOX
MYRIAM WHITE:[id number],   68yFemale followed for:  ASA; dye contrast (Anaphylaxis)  aspirin (Short breath)  divalproex sodium (Other (Unknown))  Haldol (Other (Unknown))  penicillin (Short breath; Rash)  sulfa drugs (Short breath; Rash)  Xanax (Other (Unknown))    PAST MEDICAL & SURGICAL HISTORY:  CVA (cerebral vascular accident)  Fatty pancreas  PNA (pneumonia)  Pulmonary HTN  IGT (impaired glucose tolerance)  Ulcerative colitis  Acid reflux  Anxiety  Depression  Mouth sores  HLD (hyperlipidemia)  Asthma  Humeral head fracture  H/O: hysterectomy  H/O cataract extraction, left  History of knee replacement    FAMILY HISTORY:  Family history of dementia (Grandparent)  Family history of colon cancer (Grandparent)    MEDICATIONS  (STANDING):  ALBUTerol/ipratropium for Nebulization 3 milliLiter(s) Nebulizer every 6 hours  buDESOnide   0.5 milliGRAM(s) Respule 0.5 milliGRAM(s) Inhalation every 12 hours  chlorhexidine 4% Liquid 1 Application(s) Topical <User Schedule>  clonazePAM Tablet 2 milliGRAM(s) Oral <User Schedule>  enoxaparin Injectable 40 milliGRAM(s) SubCutaneous daily  insulin lispro (HumaLOG) corrective regimen sliding scale   SubCutaneous every 6 hours  lidocaine   Patch 1 Patch Transdermal every 24 hours  mirtazapine 45 milliGRAM(s) Oral <User Schedule>  nystatin    Suspension 135153 Unit(s) Oral four times a day  pantoprazole  Injectable 40 milliGRAM(s) IV Push every 24 hours  predniSONE  Solution 10 milliGRAM(s) Enteral Tube every 24 hours  QUEtiapine 50 milliGRAM(s) Oral <User Schedule>  sertraline 50 milliGRAM(s) Oral daily  vancomycin  IVPB 500 milliGRAM(s) IV Intermittent every 24 hours    MEDICATIONS  (PRN):  acetaminophen  IVPB .. 1000 milliGRAM(s) IV Intermittent every 8 hours PRN Mild Pain (1 - 3), Moderate Pain (4 - 6), Severe Pain (7 - 10)  nystatin Powder 1 Application(s) Topical two times a day PRN rash/itchiness      Vital Signs Last 24 Hrs  T(C): 36.9 (05 Sep 2018 00:24), Max: 36.9 (04 Sep 2018 09:36)  T(F): 98.5 (05 Sep 2018 00:24), Max: 98.5 (04 Sep 2018 09:36)  HR: 113 (05 Sep 2018 00:24) (100 - 114)  BP: 154/86 (05 Sep 2018 00:24) (127/75 - 155/91)  BP(mean): --  RR: 17 (05 Sep 2018 00:24) (16 - 17)  SpO2: 98% (05 Sep 2018 00:24) (93% - 100%)  nc/at  s1s2  cta  soft, nt, nd no guarding or rebound  no c/c/e    CBC Full  -  ( 03 Sep 2018 10:23 )  WBC Count : 11.4 K/uL  Hemoglobin : 8.8 g/dL  Hematocrit : 27.2 %  Platelet Count - Automated : 378 K/uL  Mean Cell Volume : 90.6 fl  Mean Cell Hemoglobin : 29.4 pg  Mean Cell Hemoglobin Concentration : 32.5 gm/dL  Auto Neutrophil # : x  Auto Lymphocyte # : x  Auto Monocyte # : x  Auto Eosinophil # : x  Auto Basophil # : x  Auto Neutrophil % : x  Auto Lymphocyte % : x  Auto Monocyte % : x  Auto Eosinophil % : x  Auto Basophil % : x    09-03    136  |  102  |  17  ----------------------------<  174<H>  4.1   |  24  |  0.78    Ca    8.3<L>      03 Sep 2018 10:23

## 2018-09-05 NOTE — PROGRESS NOTE ADULT - SUBJECTIVE AND OBJECTIVE BOX
---___---___---___---___---___---___ ---___---___---___---___---___---___---___---___---___---                    <<<  M E D I C A L   A T T E N D I N G    F O L L O W    U P   N O T E  >>>    - Patient seen and examined by me approximately thirty minutes ago.  - In summary, patient is a 68y year old Female who origianlly presented with bandemia and has mrsa sepsis and  acute respiratory failure   - Patient today overall doing ok, comfortable, eating OK.     ---___---___---___---___---___---      <<<  MEDICATIONS:  >>>    MEDICATIONS  (STANDING):  ALBUTerol/ipratropium for Nebulization 3 milliLiter(s) Nebulizer every 6 hours  buDESOnide   0.5 milliGRAM(s) Respule 0.5 milliGRAM(s) Inhalation every 12 hours  chlorhexidine 4% Liquid 1 Application(s) Topical <User Schedule>  clonazePAM Tablet 2 milliGRAM(s) Oral <User Schedule>  enoxaparin Injectable 40 milliGRAM(s) SubCutaneous daily  insulin lispro (HumaLOG) corrective regimen sliding scale   SubCutaneous every 6 hours  lidocaine   Patch 1 Patch Transdermal every 24 hours  mirtazapine 45 milliGRAM(s) Oral <User Schedule>  nystatin    Suspension 750892 Unit(s) Oral four times a day  pantoprazole  Injectable 40 milliGRAM(s) IV Push every 24 hours  predniSONE  Solution 10 milliGRAM(s) Enteral Tube every 24 hours  QUEtiapine 50 milliGRAM(s) Oral <User Schedule>  sertraline 50 milliGRAM(s) Oral daily  vancomycin  IVPB 500 milliGRAM(s) IV Intermittent every 24 hours      MEDICATIONS  (PRN):  acetaminophen  IVPB .. 1000 milliGRAM(s) IV Intermittent every 8 hours PRN Mild Pain (1 - 3), Moderate Pain (4 - 6), Severe Pain (7 - 10)  nystatin Powder 1 Application(s) Topical two times a day PRN rash/itchiness       ---___---___---___---___---___---     <<<REVIEW OF SYSTEM: >>>    GEN: no fever, no chills, no pain  RESP: no SOB, no cough, no sputum  CVS: no chest pain, no palpitations, no edema  GI: no abdominal pain, no nausea, no vomiting, no constipation, no diarrhea  : no dysurea, no frequency  NEURO: no headache, no dizziness  PSYCH: no depression, not anxious  Derm : no itching, no rash     ---___---___---___---___---___---          <<<  VITAL SIGNS: >>>    T(F): 98.5 (09-05-18 @ 00:24), Max: 98.5 (09-05-18 @ 00:24)  HR: 113 (09-05-18 @ 00:24) (100 - 114)  BP: 154/86 (09-05-18 @ 00:24) (127/75 - 154/86)  RR: 17 (09-05-18 @ 00:24) (16 - 17)  SpO2: 98% (09-05-18 @ 00:24) (93% - 98%)  Wt(kg): --  CAPILLARY BLOOD GLUCOSE      POCT Blood Glucose.: 229 mg/dL (05 Sep 2018 06:35)    I&O's Summary    04 Sep 2018 07:01  -  05 Sep 2018 07:00  --------------------------------------------------------  IN: 360 mL / OUT: 900 mL / NET: -540 mL         ---___---___---___---___---___---                       PHYSICAL EXAM:    GEN: A&O X 3 , NAD , comfortable  HEENT: NCAT, PERRL, MMM, no scleral icterus, hearing intact  NECK: Supple, No JVD  CVS: S1S2 , regular , No M/R/G appreciated  PULM: CTA B/L,  no W/R/R appreciated  ABD.: soft. non tender, non distended,  bowel sounds present peg placed  Extrem: intact pulses , no edema noted  Derm: sacral decubiotus ulcer  PSYCH: normal mood, no depression, not anxious     ---___---___---___---___---___---     <<<  LAB AND IMAGING: >>>                                                 [All pertinent / recent available Imaging reports and other labs reviewed]     ---___---___---___---___---___---___ ---___---___---___---___---           <<<  A S S E S S M E N T   A N D   P L A N :  >>>          -GI/DVT Prophylaxis.    --------------------------------------------  Case discussed with mr roque and chantell cooper  Education given on     >>______________________<<      Deniz Bolden .         phone   [phone]

## 2018-09-05 NOTE — PROGRESS NOTE ADULT - SUBJECTIVE AND OBJECTIVE BOX
No events overnight.  Pt with increased pain yesterday and received dilaudid which controlled pain but family member states that sundowning was worst it has been last night with patient pulling at lines and undressing herself.    Vital Signs Last 24 Hrs  T(C): 36.9 (05 Sep 2018 00:24), Max: 36.9 (04 Sep 2018 09:36)  T(F): 98.5 (05 Sep 2018 00:24), Max: 98.5 (04 Sep 2018 09:36)  HR: 113 (05 Sep 2018 00:24) (100 - 114)  BP: 154/86 (05 Sep 2018 00:24) (127/75 - 155/91)  BP(mean): --  RR: 17 (05 Sep 2018 00:24) (16 - 17)  SpO2: 98% (05 Sep 2018 00:24) (93% - 100%)    NAD  bilateral lower ext: + TA EHL GS  SILT  comp soft  toes warm  Dressing CDI, will return later this morning to change    68F s/p R hip explant of implant with articulating spacer and ORIF femur POD 33  - pain control  - PT  - BID dressing change with wet to dry  - abx

## 2018-09-06 PROBLEM — S72.001D CLOSED FRACTURE OF NECK OF RIGHT FEMUR WITH ROUTINE HEALING, SUBSEQUENT ENCOUNTER: Status: ACTIVE | Noted: 2018-07-09

## 2018-09-06 PROBLEM — S72.114D CLOSED NONDISPLACED FRACTURE OF GREATER TROCHANTER OF RIGHT FEMUR WITH ROUTINE HEALING, SUBSEQUENT ENCOUNTER: Status: ACTIVE | Noted: 2018-07-09

## 2018-09-06 LAB
ANION GAP SERPL CALC-SCNC: 10 MMOL/L — SIGNIFICANT CHANGE UP (ref 5–17)
BASOPHILS # BLD AUTO: 0.05 K/UL — SIGNIFICANT CHANGE UP (ref 0–0.2)
BASOPHILS NFR BLD AUTO: 0.5 % — SIGNIFICANT CHANGE UP (ref 0–2)
BUN SERPL-MCNC: 23 MG/DL — SIGNIFICANT CHANGE UP (ref 7–23)
CALCIUM SERPL-MCNC: 8.1 MG/DL — LOW (ref 8.4–10.5)
CHLORIDE SERPL-SCNC: 101 MMOL/L — SIGNIFICANT CHANGE UP (ref 96–108)
CO2 SERPL-SCNC: 28 MMOL/L — SIGNIFICANT CHANGE UP (ref 22–31)
CREAT SERPL-MCNC: 0.6 MG/DL — SIGNIFICANT CHANGE UP (ref 0.5–1.3)
EOSINOPHIL # BLD AUTO: 0.21 K/UL — SIGNIFICANT CHANGE UP (ref 0–0.5)
EOSINOPHIL NFR BLD AUTO: 2.1 % — SIGNIFICANT CHANGE UP (ref 0–6)
GLUCOSE BLDC GLUCOMTR-MCNC: 115 MG/DL — HIGH (ref 70–99)
GLUCOSE BLDC GLUCOMTR-MCNC: 123 MG/DL — HIGH (ref 70–99)
GLUCOSE BLDC GLUCOMTR-MCNC: 177 MG/DL — HIGH (ref 70–99)
GLUCOSE BLDC GLUCOMTR-MCNC: 177 MG/DL — HIGH (ref 70–99)
GLUCOSE SERPL-MCNC: 175 MG/DL — HIGH (ref 70–99)
HCT VFR BLD CALC: 25.4 % — LOW (ref 34.5–45)
HGB BLD-MCNC: 8.2 G/DL — LOW (ref 11.5–15.5)
IMM GRANULOCYTES NFR BLD AUTO: 1 % — SIGNIFICANT CHANGE UP (ref 0–1.5)
LYMPHOCYTES # BLD AUTO: 1.17 K/UL — SIGNIFICANT CHANGE UP (ref 1–3.3)
LYMPHOCYTES # BLD AUTO: 11.4 % — LOW (ref 13–44)
MAGNESIUM SERPL-MCNC: 1.2 MG/DL — LOW (ref 1.6–2.6)
MCHC RBC-ENTMCNC: 29.9 PG — SIGNIFICANT CHANGE UP (ref 27–34)
MCHC RBC-ENTMCNC: 32.3 GM/DL — SIGNIFICANT CHANGE UP (ref 32–36)
MCV RBC AUTO: 92.7 FL — SIGNIFICANT CHANGE UP (ref 80–100)
MONOCYTES # BLD AUTO: 0.15 K/UL — SIGNIFICANT CHANGE UP (ref 0–0.9)
MONOCYTES NFR BLD AUTO: 1.5 % — LOW (ref 2–14)
NEUTROPHILS # BLD AUTO: 8.54 K/UL — HIGH (ref 1.8–7.4)
NEUTROPHILS NFR BLD AUTO: 83.5 % — HIGH (ref 43–77)
PHOSPHATE SERPL-MCNC: 2.8 MG/DL — SIGNIFICANT CHANGE UP (ref 2.5–4.5)
PLATELET # BLD AUTO: 385 K/UL — SIGNIFICANT CHANGE UP (ref 150–400)
POTASSIUM SERPL-MCNC: 4.1 MMOL/L — SIGNIFICANT CHANGE UP (ref 3.5–5.3)
POTASSIUM SERPL-SCNC: 4.1 MMOL/L — SIGNIFICANT CHANGE UP (ref 3.5–5.3)
RBC # BLD: 2.74 M/UL — LOW (ref 3.8–5.2)
RBC # FLD: 16.2 % — HIGH (ref 10.3–14.5)
SODIUM SERPL-SCNC: 139 MMOL/L — SIGNIFICANT CHANGE UP (ref 135–145)
WBC # BLD: 10.22 K/UL — SIGNIFICANT CHANGE UP (ref 3.8–10.5)
WBC # FLD AUTO: 10.22 K/UL — SIGNIFICANT CHANGE UP (ref 3.8–10.5)

## 2018-09-06 PROCEDURE — 99231 SBSQ HOSP IP/OBS SF/LOW 25: CPT

## 2018-09-06 PROCEDURE — 99232 SBSQ HOSP IP/OBS MODERATE 35: CPT

## 2018-09-06 RX ORDER — MAGNESIUM SULFATE 500 MG/ML
1 VIAL (ML) INJECTION ONCE
Qty: 0 | Refills: 0 | Status: COMPLETED | OUTPATIENT
Start: 2018-09-06 | End: 2018-09-06

## 2018-09-06 RX ORDER — OXYCODONE AND ACETAMINOPHEN 5; 325 MG/1; MG/1
1 TABLET ORAL DAILY
Qty: 0 | Refills: 0 | Status: DISCONTINUED | OUTPATIENT
Start: 2018-09-06 | End: 2018-09-08

## 2018-09-06 RX ORDER — CLONAZEPAM 1 MG
2 TABLET ORAL ONCE
Qty: 0 | Refills: 0 | Status: DISCONTINUED | OUTPATIENT
Start: 2018-09-06 | End: 2018-09-06

## 2018-09-06 RX ORDER — ACETAMINOPHEN 500 MG
1000 TABLET ORAL EVERY 8 HOURS
Qty: 0 | Refills: 0 | Status: COMPLETED | OUTPATIENT
Start: 2018-09-06 | End: 2018-09-06

## 2018-09-06 RX ORDER — ACETAMINOPHEN 500 MG
500 TABLET ORAL ONCE
Qty: 0 | Refills: 0 | Status: COMPLETED | OUTPATIENT
Start: 2018-09-06 | End: 2018-09-06

## 2018-09-06 RX ADMIN — LIDOCAINE 1 PATCH: 4 CREAM TOPICAL at 12:26

## 2018-09-06 RX ADMIN — Medication 100 MILLIGRAM(S): at 10:33

## 2018-09-06 RX ADMIN — CHLORHEXIDINE GLUCONATE 1 APPLICATION(S): 213 SOLUTION TOPICAL at 06:42

## 2018-09-06 RX ADMIN — Medication 1: at 12:56

## 2018-09-06 RX ADMIN — Medication 2 MILLIGRAM(S): at 22:22

## 2018-09-06 RX ADMIN — HUMAN INSULIN 5 UNIT(S): 100 INJECTION, SUSPENSION SUBCUTANEOUS at 00:33

## 2018-09-06 RX ADMIN — Medication 3 MILLILITER(S): at 12:55

## 2018-09-06 RX ADMIN — Medication 1000 MILLIGRAM(S): at 22:48

## 2018-09-06 RX ADMIN — Medication 400 MILLIGRAM(S): at 22:18

## 2018-09-06 RX ADMIN — Medication 500000 UNIT(S): at 19:04

## 2018-09-06 RX ADMIN — Medication 500000 UNIT(S): at 06:42

## 2018-09-06 RX ADMIN — Medication 3 MILLILITER(S): at 19:04

## 2018-09-06 RX ADMIN — QUETIAPINE FUMARATE 50 MILLIGRAM(S): 200 TABLET, FILM COATED ORAL at 22:22

## 2018-09-06 RX ADMIN — LIDOCAINE 1 PATCH: 4 CREAM TOPICAL at 22:23

## 2018-09-06 RX ADMIN — Medication 3 MILLILITER(S): at 06:42

## 2018-09-06 RX ADMIN — MIRTAZAPINE 45 MILLIGRAM(S): 45 TABLET, ORALLY DISINTEGRATING ORAL at 22:22

## 2018-09-06 RX ADMIN — Medication 200 MILLIGRAM(S): at 09:06

## 2018-09-06 RX ADMIN — Medication 1: at 06:56

## 2018-09-06 RX ADMIN — Medication 500 MILLIGRAM(S): at 09:21

## 2018-09-06 RX ADMIN — Medication 100 GRAM(S): at 12:56

## 2018-09-06 RX ADMIN — Medication 0.5 MILLIGRAM(S): at 12:55

## 2018-09-06 RX ADMIN — ENOXAPARIN SODIUM 40 MILLIGRAM(S): 100 INJECTION SUBCUTANEOUS at 12:57

## 2018-09-06 RX ADMIN — PANTOPRAZOLE SODIUM 40 MILLIGRAM(S): 20 TABLET, DELAYED RELEASE ORAL at 13:06

## 2018-09-06 RX ADMIN — Medication 10 MILLIGRAM(S): at 00:33

## 2018-09-06 RX ADMIN — Medication 500000 UNIT(S): at 12:55

## 2018-09-06 RX ADMIN — SERTRALINE 50 MILLIGRAM(S): 25 TABLET, FILM COATED ORAL at 12:56

## 2018-09-06 NOTE — PROGRESS NOTE ADULT - SUBJECTIVE AND OBJECTIVE BOX
---___---___---___---___---___---___ ---___---___---___---___---___---___---___---___---___---                    <<<  M E D I C A L   A T T E N D I N G    F O L L O W    U P   N O T E  >>>  has kai having pain from the acetabular fracture otherwise has remaied unchanges and slightly improved endocrinology has followed them and ortho ansd id on board as well plastics for wound dehiscence on board as well      ---___---___---___---___---___---      <<<  MEDICATIONS:  >>>    MEDICATIONS  (STANDING):  ALBUTerol/ipratropium for Nebulization 3 milliLiter(s) Nebulizer every 6 hours  buDESOnide   0.5 milliGRAM(s) Respule 0.5 milliGRAM(s) Inhalation every 12 hours  chlorhexidine 4% Liquid 1 Application(s) Topical <User Schedule>  dextrose 5%. 1000 milliLiter(s) (50 mL/Hr) IV Continuous <Continuous>  dextrose 50% Injectable 12.5 Gram(s) IV Push once  dextrose 50% Injectable 25 Gram(s) IV Push once  dextrose 50% Injectable 25 Gram(s) IV Push once  enoxaparin Injectable 40 milliGRAM(s) SubCutaneous daily  insulin lispro (HumaLOG) corrective regimen sliding scale   SubCutaneous every 6 hours  insulin NPH human recombinant 5 Unit(s) SubCutaneous at bedtime  lidocaine   Patch 1 Patch Transdermal every 24 hours  magnesium sulfate  IVPB 1 Gram(s) IV Intermittent once  mirtazapine 45 milliGRAM(s) Oral <User Schedule>  nystatin    Suspension 633067 Unit(s) Oral four times a day  pantoprazole  Injectable 40 milliGRAM(s) IV Push every 24 hours  predniSONE  Solution 10 milliGRAM(s) Enteral Tube every 24 hours  QUEtiapine 50 milliGRAM(s) Oral <User Schedule>  sertraline 50 milliGRAM(s) Oral daily  vancomycin  IVPB 500 milliGRAM(s) IV Intermittent every 24 hours      MEDICATIONS  (PRN):  acetaminophen  IVPB .. 1000 milliGRAM(s) IV Intermittent every 8 hours PRN Mild Pain (1 - 3), Moderate Pain (4 - 6), Severe Pain (7 - 10)  dextrose 40% Gel 15 Gram(s) Oral once PRN Blood Glucose LESS THAN 70 milliGRAM(s)/deciLiter  glucagon  Injectable 1 milliGRAM(s) IntraMuscular once PRN Glucose <70 milliGRAM(s)/deciLiter  nystatin Powder 1 Application(s) Topical two times a day PRN rash/itchiness       ---___---___---___---___---___---     <<<REVIEW OF SYSTEM: >>>        ---___---___---___---___---___---          <<<  VITAL SIGNS: >>>    T(F): 98.6 (09-06-18 @ 08:00), Max: 98.6 (09-06-18 @ 08:00)  HR: 108 (09-06-18 @ 08:00) (105 - 119)  BP: 130/75 (09-06-18 @ 08:00) (111/68 - 130/75)  RR: 18 (09-06-18 @ 08:00) (17 - 18)  SpO2: 95% (09-06-18 @ 08:00) (94% - 100%)  Wt(kg): --  CAPILLARY BLOOD GLUCOSE      POCT Blood Glucose.: 177 mg/dL (06 Sep 2018 12:10)    I&O's Summary    05 Sep 2018 07:01  -  06 Sep 2018 07:00  --------------------------------------------------------  IN: 820 mL / OUT: 1450 mL / NET: -630 mL    06 Sep 2018 07:01  -  06 Sep 2018 12:48  --------------------------------------------------------  IN: 0 mL / OUT: 300 mL / NET: -300 mL         ---___---___---___---___---___---                       PHYSICAL EXAM:    GEN: A&O X 2 , NAD , comfortable  HEENT: NCAT, PERRL, MMM, no scleral icterus, hearing intact  NECK: Supple, No JVD  CVS: S1S2 , regular , No M/R/G appreciated  PULM: CTA B/L,  no W/R/R appreciated  ABD.: soft. non tender, non distended,  bowel sounds present  Extrem: intact pulses , no edema noted  Derm: right wound dehiscence   sacral ulcer   PSYCH: normal mood, no depression,  anxious     ---___---___---___---___---___---     <<<  LAB AND IMAGING: >>>                          8.2    10.22 )-----------( 385      ( 06 Sep 2018 09:17 )             25.4               09-06    139  |  101  |  23  ----------------------------<  175<H>  4.1   |  28  |  0.60    Ca    8.1<L>      06 Sep 2018 07:28  Phos  2.8     09-06  Mg     1.2     09-06                                 [All pertinent / recent available Imaging reports and other labs reviewed]     ---___---___---___---___---___---___ ---___---___---___---___---           <<<  A S S E S S M E N T   A N D   P L A N :  >>>          -GI/DVT Prophylaxis.    --------------------------------------------  Case discussed with   Education given on     >>______________________<<      Deniz Bolden .         phone   [phone]

## 2018-09-06 NOTE — PROGRESS NOTE ADULT - SUBJECTIVE AND OBJECTIVE BOX
MYRIAM WHITE:[id number],   68yFemale followed for:  ASA; dye contrast (Anaphylaxis)  aspirin (Short breath)  divalproex sodium (Other (Unknown))  Haldol (Other (Unknown))  penicillin (Short breath; Rash)  sulfa drugs (Short breath; Rash)  Xanax (Other (Unknown))    PAST MEDICAL & SURGICAL HISTORY:  CVA (cerebral vascular accident)  Fatty pancreas  PNA (pneumonia)  Pulmonary HTN  IGT (impaired glucose tolerance)  Ulcerative colitis  Acid reflux  Anxiety  Depression  Mouth sores  HLD (hyperlipidemia)  Asthma  Humeral head fracture  H/O: hysterectomy  H/O cataract extraction, left  History of knee replacement    FAMILY HISTORY:  Family history of dementia (Grandparent)  Family history of colon cancer (Grandparent)    MEDICATIONS  (STANDING):  acetaminophen  IVPB .. 500 milliGRAM(s) IV Intermittent once  ALBUTerol/ipratropium for Nebulization 3 milliLiter(s) Nebulizer every 6 hours  buDESOnide   0.5 milliGRAM(s) Respule 0.5 milliGRAM(s) Inhalation every 12 hours  chlorhexidine 4% Liquid 1 Application(s) Topical <User Schedule>  dextrose 5%. 1000 milliLiter(s) (50 mL/Hr) IV Continuous <Continuous>  dextrose 50% Injectable 12.5 Gram(s) IV Push once  dextrose 50% Injectable 25 Gram(s) IV Push once  dextrose 50% Injectable 25 Gram(s) IV Push once  enoxaparin Injectable 40 milliGRAM(s) SubCutaneous daily  insulin lispro (HumaLOG) corrective regimen sliding scale   SubCutaneous every 6 hours  insulin NPH human recombinant 5 Unit(s) SubCutaneous at bedtime  lidocaine   Patch 1 Patch Transdermal every 24 hours  magnesium sulfate  IVPB 1 Gram(s) IV Intermittent once  mirtazapine 45 milliGRAM(s) Oral <User Schedule>  nystatin    Suspension 522860 Unit(s) Oral four times a day  pantoprazole  Injectable 40 milliGRAM(s) IV Push every 24 hours  predniSONE  Solution 10 milliGRAM(s) Enteral Tube every 24 hours  QUEtiapine 50 milliGRAM(s) Oral <User Schedule>  sertraline 50 milliGRAM(s) Oral daily  vancomycin  IVPB 500 milliGRAM(s) IV Intermittent every 24 hours    MEDICATIONS  (PRN):  acetaminophen  IVPB .. 1000 milliGRAM(s) IV Intermittent every 8 hours PRN Mild Pain (1 - 3), Moderate Pain (4 - 6), Severe Pain (7 - 10)  dextrose 40% Gel 15 Gram(s) Oral once PRN Blood Glucose LESS THAN 70 milliGRAM(s)/deciLiter  glucagon  Injectable 1 milliGRAM(s) IntraMuscular once PRN Glucose <70 milliGRAM(s)/deciLiter  nystatin Powder 1 Application(s) Topical two times a day PRN rash/itchiness      Vital Signs Last 24 Hrs  T(C): 36.8 (06 Sep 2018 04:47), Max: 36.8 (05 Sep 2018 14:00)  T(F): 98.3 (06 Sep 2018 04:47), Max: 98.3 (06 Sep 2018 04:47)  HR: 112 (06 Sep 2018 04:47) (105 - 119)  BP: 127/83 (06 Sep 2018 04:47) (111/68 - 127/83)  BP(mean): --  RR: 18 (06 Sep 2018 04:47) (17 - 18)  SpO2: 94% (06 Sep 2018 04:47) (94% - 100%)  nc/at  s1s2  cta  soft, nt, nd no guarding or rebound  no c/c/e    CBC Full  -  ( 05 Sep 2018 13:08 )  WBC Count : 12.2 K/uL  Hemoglobin : 8.4 g/dL  Hematocrit : 26.1 %  Platelet Count - Automated : 407 K/uL  Mean Cell Volume : 90.8 fl  Mean Cell Hemoglobin : 29.3 pg  Mean Cell Hemoglobin Concentration : 32.3 gm/dL  Auto Neutrophil # : x  Auto Lymphocyte # : x  Auto Monocyte # : x  Auto Eosinophil # : x  Auto Basophil # : x  Auto Neutrophil % : x  Auto Lymphocyte % : x  Auto Monocyte % : x  Auto Eosinophil % : x  Auto Basophil % : x    09-06    139  |  101  |  23  ----------------------------<  175<H>  4.1   |  28  |  0.60    Ca    8.1<L>      06 Sep 2018 07:28  Phos  2.8     09-06  Mg     1.2     09-06

## 2018-09-06 NOTE — PROGRESS NOTE ADULT - PROBLEM SELECTOR PLAN 1
Goal glucose 100-180  Currently at or close to goal  Continue to monitor on NPH 5 units at bedtime only if she gets the prednisone.  Continue with correction scale q6 during the day on tube feeds.   Will adjust as needed.

## 2018-09-06 NOTE — PROGRESS NOTE ADULT - SUBJECTIVE AND OBJECTIVE BOX
Chief Complaint: Evaluating this 67 y/o for hyperglycemia      Interval History: Feeling better today. Still with LE pain. On 24 hour tube feeds. Hyperglycemia improving on NPH at bedtime with prednisone.     MEDICATIONS  (STANDING):  ALBUTerol/ipratropium for Nebulization 3 milliLiter(s) Nebulizer every 6 hours  buDESOnide   0.5 milliGRAM(s) Respule 0.5 milliGRAM(s) Inhalation every 12 hours  chlorhexidine 4% Liquid 1 Application(s) Topical <User Schedule>  dextrose 5%. 1000 milliLiter(s) (50 mL/Hr) IV Continuous <Continuous>  dextrose 50% Injectable 12.5 Gram(s) IV Push once  dextrose 50% Injectable 25 Gram(s) IV Push once  dextrose 50% Injectable 25 Gram(s) IV Push once  enoxaparin Injectable 40 milliGRAM(s) SubCutaneous daily  insulin lispro (HumaLOG) corrective regimen sliding scale   SubCutaneous every 6 hours  insulin NPH human recombinant 5 Unit(s) SubCutaneous at bedtime  lidocaine   Patch 1 Patch Transdermal every 24 hours  mirtazapine 45 milliGRAM(s) Oral <User Schedule>  nystatin    Suspension 571890 Unit(s) Oral four times a day  pantoprazole  Injectable 40 milliGRAM(s) IV Push every 24 hours  predniSONE  Solution 10 milliGRAM(s) Enteral Tube every 24 hours  QUEtiapine 50 milliGRAM(s) Oral <User Schedule>  sertraline 50 milliGRAM(s) Oral daily  vancomycin  IVPB 500 milliGRAM(s) IV Intermittent every 24 hours    MEDICATIONS  (PRN):  acetaminophen  IVPB .. 1000 milliGRAM(s) IV Intermittent every 8 hours PRN Mild Pain (1 - 3), Moderate Pain (4 - 6), Severe Pain (7 - 10)  dextrose 40% Gel 15 Gram(s) Oral once PRN Blood Glucose LESS THAN 70 milliGRAM(s)/deciLiter  glucagon  Injectable 1 milliGRAM(s) IntraMuscular once PRN Glucose <70 milliGRAM(s)/deciLiter  nystatin Powder 1 Application(s) Topical two times a day PRN rash/itchiness  oxyCODONE    5 mG/acetaminophen 325 mG 1 Tablet(s) Oral daily PRN Moderate Pain (4 - 6)      Allergies    ASA; dye contrast (Anaphylaxis)  aspirin (Short breath)  divalproex sodium (Other (Unknown))  Haldol (Other (Unknown))  penicillin (Short breath; Rash)  sulfa drugs (Short breath; Rash)  Xanax (Other (Unknown))    Intolerances      Review of Systems:  Constitutional: No fever  Eyes: No blurry vision  Cardiovascular: No chest pain  Respiratory: +occasional SOB  GI: +occasional abdominal pain, No nausea, No vomiting  Endocrine: as noted in HPI    All other negative      PHYSICAL EXAM:  VITALS: T(C): 36.6 (09-06-18 @ 16:45)  T(F): 97.9 (09-06-18 @ 16:45), Max: 98.6 (09-06-18 @ 08:00)  HR: 115 (09-06-18 @ 16:45) (101 - 119)  BP: 140/82 (09-06-18 @ 16:45) (111/68 - 140/82)  RR:  (17 - 18)  SpO2:  (94% - 100%)  Wt(kg): --  GENERAL: NAD at this time, cachectic  EYES: EOMI, No proptosis  HEENT:  Atraumatic, Normocephalic,   RESPIRATORY: Clear to auscultation bilaterally, full excursion, non labored  CARDIOVASCULAR: Regular rhythm; normal S1/S2, no peripheral edema  GI: Soft, nontender, non distended, normal bowel sounds  SKIN: Warm and dry  PSYCH: normal affect, normal mood      POCT Blood Glucose.: 177 mg/dL (09-06-18 @ 12:10)  POCT Blood Glucose.: 177 mg/dL (09-06-18 @ 06:41)  POCT Blood Glucose.: 123 mg/dL (09-06-18 @ 00:27)  POCT Blood Glucose.: 104 mg/dL (09-05-18 @ 18:05)  POCT Blood Glucose.: 139 mg/dL (09-05-18 @ 12:30)  POCT Blood Glucose.: 229 mg/dL (09-05-18 @ 06:35)  POCT Blood Glucose.: 129 mg/dL (09-05-18 @ 00:19)  POCT Blood Glucose.: 99 mg/dL (09-04-18 @ 18:19)  POCT Blood Glucose.: 98 mg/dL (09-04-18 @ 18:00)  POCT Blood Glucose.: 151 mg/dL (09-04-18 @ 12:55)  POCT Blood Glucose.: 226 mg/dL (09-04-18 @ 06:55)  POCT Blood Glucose.: 111 mg/dL (09-04-18 @ 00:23)  POCT Blood Glucose.: 77 mg/dL (09-03-18 @ 17:59)        09-06    139  |  101  |  23  ----------------------------<  175<H>  4.1   |  28  |  0.60    EGFR if : 109  EGFR if non : 94    Ca    8.1<L>      09-06  Mg     1.2     09-06  Phos  2.8     09-06          Thyroid Function Tests:      Hemoglobin A1C, Whole Blood: 5.5 % [4.0 - 5.6] (07-27-18 @ 09:02)

## 2018-09-06 NOTE — PROGRESS NOTE ADULT - SUBJECTIVE AND OBJECTIVE BOX
PULMONARY PROGRESS NOTE    MYRIAM HEATH  MRN-[id number]    Patient is a 68y old  Female who presents with a chief complaint of weakness (26 Jul 2018 16:54)      HPI:  has peg, leg is bothering her last 2 days, didn't sleep much, breathing comfortably, no cough or wheeze.    MEDICATIONS  (STANDING):  ALBUTerol/ipratropium for Nebulization 3 milliLiter(s) Nebulizer every 6 hours  buDESOnide   0.5 milliGRAM(s) Respule 0.5 milliGRAM(s) Inhalation every 12 hours  chlorhexidine 4% Liquid 1 Application(s) Topical <User Schedule>  dextrose 5%. 1000 milliLiter(s) (50 mL/Hr) IV Continuous <Continuous>  dextrose 50% Injectable 12.5 Gram(s) IV Push once  dextrose 50% Injectable 25 Gram(s) IV Push once  dextrose 50% Injectable 25 Gram(s) IV Push once  enoxaparin Injectable 40 milliGRAM(s) SubCutaneous daily  insulin lispro (HumaLOG) corrective regimen sliding scale   SubCutaneous every 6 hours  insulin NPH human recombinant 5 Unit(s) SubCutaneous at bedtime  lidocaine   Patch 1 Patch Transdermal every 24 hours  mirtazapine 45 milliGRAM(s) Oral <User Schedule>  nystatin    Suspension 501680 Unit(s) Oral four times a day  pantoprazole  Injectable 40 milliGRAM(s) IV Push every 24 hours  predniSONE  Solution 10 milliGRAM(s) Enteral Tube every 24 hours  QUEtiapine 50 milliGRAM(s) Oral <User Schedule>  sertraline 50 milliGRAM(s) Oral daily  vancomycin  IVPB 500 milliGRAM(s) IV Intermittent every 24 hours    MEDICATIONS  (PRN):  acetaminophen  IVPB .. 1000 milliGRAM(s) IV Intermittent every 8 hours PRN Mild Pain (1 - 3), Moderate Pain (4 - 6), Severe Pain (7 - 10)  dextrose 40% Gel 15 Gram(s) Oral once PRN Blood Glucose LESS THAN 70 milliGRAM(s)/deciLiter  glucagon  Injectable 1 milliGRAM(s) IntraMuscular once PRN Glucose <70 milliGRAM(s)/deciLiter  nystatin Powder 1 Application(s) Topical two times a day PRN rash/itchiness  oxyCODONE    5 mG/acetaminophen 325 mG 1 Tablet(s) Oral daily PRN Moderate Pain (4 - 6)    EXAM:  Vital Signs Last 24 Hrs  T(C): 37 (06 Sep 2018 08:00), Max: 37 (06 Sep 2018 08:00)  T(F): 98.6 (06 Sep 2018 08:00), Max: 98.6 (06 Sep 2018 08:00)  HR: 108 (06 Sep 2018 08:00) (105 - 119)  BP: 130/75 (06 Sep 2018 08:00) (111/68 - 130/75)  BP(mean): --  RR: 18 (06 Sep 2018 08:00) (17 - 18)  SpO2: 95% (06 Sep 2018 08:00) (94% - 100%)      GENERAL: The patient is awake and alert in no apparent distress    LUNGS: clear      LABS/IMAGING: reviewed                                 8.2    10.22 )-----------( 385      ( 06 Sep 2018 09:17 )             25.4   09-06    139  |  101  |  23  ----------------------------<  175<H>  4.1   |  28  |  0.60    Ca    8.1<L>      06 Sep 2018 07:28  Phos  2.8     09-06  Mg     1.2     09-06          < from: Xray Chest 1 View- PORTABLE-Routine (09.01.18 @ 07:29) >  IMPRESSION:    No change from prior    < end of copied text >            PROBLEM LIST:  68y Female with HEALTH ISSUES - PROBLEM Dx:  Asthma  sepsis  leg infection        RECS:  -pulmicort and duoneb, for asthma.  would dc prednisone.  -incentive neelima, deep breathing exercises  -pain control  -ID/surgery follow up  -PT      Luciana Mancini MD   [phone]

## 2018-09-06 NOTE — CHART NOTE - NSCHARTNOTEFT_GEN_A_CORE
Pt seen on 7TOW for malnutrition follow up.    Chart reviewed, events noted. Pt resting comfortably,  requesting not  to disturb pt. Pt continues to tolerate TF well with no excessive residuals and BM noted yesterday and today.    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 Is s/p PEG 8/24. Course c/b readmission to SICU 8/27 with septic shock requiring pressors, had vomiting episode 8/29 and TF held; TF was resumed 8/31 at goal rate of 10 ml/hr x 24 hours; subsequently increased to 20 ml/hr and changed to goal rate of 30 ml/hr x 24 hours. Per previous RD note, recommended increase TF to goal rate of 35mL/hr, however current order not reflecting change. RD was unable to reach provider at that time (9/3). Today seen in room with TF currently running at 30 ml/hr. Sepsis noted as resolved per ID. Contacted provider successfully today (9/6) to increase TF and add Iron. Of note, pt with elevated BG, now receiving corrective insulin at bedtime while she is prescribed the prednisone 10mg, which is contributing to elevated BG. Per endo, goal glucose range 100-180.       Source: Patient [ ]    Family [ ]     other [x] EMR    Diet : NPO with tubefeeding via PEG. Please see enteral nutrition orders below.      Patient reports [ ] nausea  [ ] vomiting [ ] diarrhea [ ] constipation  [ ]chewing problems [ ] swallowing issues  [x] other: Per RN, pt tolerating TF. Gastric residuals have been <90ml per flowsheet records       PO intake:  < 50% [ ] 50-75% [ ]   % [x]  other : n/a     Source for PO intake [ ] Patient [ ] family [ ] chart [ ] staff [x] other: n/a     Enteral /Parenteral Nutrition: via PEG: Pivot 1.5 running at goal of 30 ml/hr x 24 hours.  Provides (based on current body weight 56 kG):  Calorie: 1080 kcal (19.3 kcal/kG)  Protein: 67.5 grams (1.2 grams/kG)  Free H2O: 546 ml (10 ml/kG)  Total volume: 720 ml     Current Weight: 123.2 pounds (as of 8/31, [facility]). 127.6 pounds admit wt on 7/26/18. 4 pound wt loss in house.  Wt on 9/5 noted as 110 pounds but wt calculator used. Recommend obtain new wt.    Pertinent Medications: MEDICATIONS  (STANDING):  ALBUTerol/ipratropium for Nebulization 3 milliLiter(s) Nebulizer every 6 hours  buDESOnide   0.5 milliGRAM(s) Respule 0.5 milliGRAM(s) Inhalation every 12 hours  chlorhexidine 4% Liquid 1 Application(s) Topical <User Schedule>  dextrose 5%. 1000 milliLiter(s) (50 mL/Hr) IV Continuous <Continuous>  dextrose 50% Injectable 12.5 Gram(s) IV Push once  dextrose 50% Injectable 25 Gram(s) IV Push once  dextrose 50% Injectable 25 Gram(s) IV Push once  enoxaparin Injectable 40 milliGRAM(s) SubCutaneous daily  insulin lispro (HumaLOG) corrective regimen sliding scale   SubCutaneous every 6 hours  insulin NPH human recombinant 5 Unit(s) SubCutaneous at bedtime  lidocaine   Patch 1 Patch Transdermal every 24 hours  mirtazapine 45 milliGRAM(s) Oral <User Schedule>  nystatin    Suspension 115552 Unit(s) Oral four times a day  pantoprazole  Injectable 40 milliGRAM(s) IV Push every 24 hours  predniSONE  Solution 10 milliGRAM(s) Enteral Tube every 24 hours  QUEtiapine 50 milliGRAM(s) Oral <User Schedule>  sertraline 50 milliGRAM(s) Oral daily  vancomycin  IVPB 500 milliGRAM(s) IV Intermittent every 24 hours    MEDICATIONS  (PRN):  acetaminophen  IVPB .. 1000 milliGRAM(s) IV Intermittent every 8 hours PRN Mild Pain (1 - 3), Moderate Pain (4 - 6), Severe Pain (7 - 10)  dextrose 40% Gel 15 Gram(s) Oral once PRN Blood Glucose LESS THAN 70 milliGRAM(s)/deciLiter  glucagon  Injectable 1 milliGRAM(s) IntraMuscular once PRN Glucose <70 milliGRAM(s)/deciLiter  nystatin Powder 1 Application(s) Topical two times a day PRN rash/itchiness  oxyCODONE    5 mG/acetaminophen 325 mG 1 Tablet(s) Oral daily PRN Moderate Pain (4 - 6)    Pertinent Labs:  09-06 Na139 mmol/L Glu 175 mg/dL<H> K+ 4.1 mmol/L Cr  0.60 mg/dL BUN 23 mg/dL 09-06 Phos 2.8 mg/dL 08-31 Alb 1.8 g/dL<L>  POCT: 09-06 123-177, 09-05 104-229    Skin: L heel DTI, R heel DTI, sacral pressure injury noted; +1 generalized, +2 B/L ankle edema noted    Estimated Needs:   [x] no change since previous assessment  [ ] recalculated:       Previous Nutrition Diagnosis:   [x] Increased Nutrient Needs      Nutrition Diagnosis is [x] ongoing, addressed with enteral nutrition  [ ] resolved [ ] not applicable          New Nutrition Diagnosis on 9/3:  Severe Malnutrition related to unclear etiology, ?complicated medical course as evidenced by NFPE findings, wt loss in-house       Recommend    1) Recommend Increase TF Pivot 1.5 via PEG to goal of 35 ml/hr x 24 hours to provide (based off of dosing wt 50.4 kG):  Calorie: 1260 kcal (25 kcal/kG)  Protein: 79 grams protein (1.6 grams/kG)  Free H2O: 638 ml (13 ml/kG)  Total volume: 840 ml  2) Recommend add Iron x 2 packets daily via PEG to promote wound healing   3) Monitor TF tolerance   4) Monitor volume status for necessity of additional free H2O flush via PEG  5) Recommend obtain new wt       Monitoring and Evaluation:     [ ] PO intake [x] Tolerance to diet prescription [x] weights [x] follow up per protocol    [x] other: RD remains available. Seema Blevins RD, Pager: [phone]

## 2018-09-06 NOTE — PROGRESS NOTE ADULT - ASSESSMENT
A/P: 68F s/p R hip explant of implant with articulating spacer and ORIF femur POD34  - pain control  - PT  - BID dressing change with wet to dry  - PICC/abx  - Endocrine evaluation appreciated

## 2018-09-06 NOTE — PROGRESS NOTE ADULT - SUBJECTIVE AND OBJECTIVE BOX
No events overnight.  Pain present or controlled. Endocrine saw and evaluated pt. Dressing changed.     Vital Signs Last 24 Hrs  T(C): 36.8 (06 Sep 2018 04:47), Max: 36.8 (05 Sep 2018 14:00)  T(F): 98.3 (06 Sep 2018 04:47), Max: 98.3 (06 Sep 2018 04:47)  HR: 112 (06 Sep 2018 04:47) (105 - 119)  BP: 127/83 (06 Sep 2018 04:47) (111/68 - 127/83)  RR: 18 (06 Sep 2018 04:47) (17 - 18)  SpO2: 94% (06 Sep 2018 04:47) (94% - 100%)    NAD  bilateral lower ext: + TA EHL GS  SILT  comp soft  toes warm  Dressing CDI, will return later this morning to change

## 2018-09-06 NOTE — PROGRESS NOTE ADULT - ASSESSMENT
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 SHe failed washout so had to have leisa and spacer. She has had multiple episodes of subsequent sepsis with respiratory failure and has gotten repeat courses of broad spectrum empiric treatment now off and out of micu.   No uncontrolled infection is apparent.   Plan  continue vancomycin for 9 more days  supportive care

## 2018-09-06 NOTE — PROGRESS NOTE ADULT - SUBJECTIVE AND OBJECTIVE BOX
CC: f/u for mrsa prosthetic right hip infection    Patient reports right and left hip hurt    REVIEW OF SYSTEMS:  All other review of systems cannot get patient not answering further    Antimicrobials Day #  :33/42  nystatin    Suspension 580735 Unit(s) Oral four times a day  vancomycin  IVPB 500 milliGRAM(s) IV Intermittent every 24 hours    Other Medications Reviewed    T(F): 98.6 (09-06-18 @ 08:00), Max: 98.6 (09-06-18 @ 08:00)  HR: 108 (09-06-18 @ 08:00)  BP: 130/75 (09-06-18 @ 08:00)  RR: 18 (09-06-18 @ 08:00)  SpO2: 95% (09-06-18 @ 08:00)  Wt(kg): --    PHYSICAL EXAM:  General: awake pulling on oxygen, no acute distress  Eyes:  anicteric, no conjunctival injection, no discharge  Oropharynx: no lesions or injection 	  Neck: supple, without adenopathy  Lungs: course  to auscultation  Heart: regular rate and rhythm; no murmur, rubs or gallops  Abdomen: soft, nondistended, nontender, without mass or organomegaly  Skin: no lesions  Extreme: right hip wound is clean  Neurologic: alert, , moves all extremities    LAB RESULTS:                        8.2    10.22 )-----------( 385      ( 06 Sep 2018 09:17 )             25.4     09-06    139  |  101  |  23  ----------------------------<  175<H>  4.1   |  28  |  0.60    Ca    8.1<L>      06 Sep 2018 07:28  Phos  2.8     09-06  Mg     1.2     09-06          MICROBIOLOGY:  RECENT CULTURES:  Culture - Blood (08.28.18 @ 05:35)    Specimen Source: .Blood Blood-Peripheral    Culture Results:   No growth at 5 days.        RADIOLOGY REVIEWED:    < from: Xray Femur 1 View, Left (09.05.18 @ 21:05) >    IMPRESSION:     A subacute left acetabular fracture is again noted and better   characterized on the prior CT.    Sclerotic lesion in the distal femoral metadiaphysis, consistent with a   chondroid lesion. No aggressive features are demonstrated on this limited   view.      < end of copied text >

## 2018-09-07 LAB
ANION GAP SERPL CALC-SCNC: 8 MMOL/L — SIGNIFICANT CHANGE UP (ref 5–17)
ANISOCYTOSIS BLD QL: SIGNIFICANT CHANGE UP
BASOPHILS # BLD AUTO: 0.06 K/UL — SIGNIFICANT CHANGE UP (ref 0–0.2)
BASOPHILS NFR BLD AUTO: 0.5 % — SIGNIFICANT CHANGE UP (ref 0–2)
BUN SERPL-MCNC: 26 MG/DL — HIGH (ref 7–23)
CALCIUM SERPL-MCNC: 8.2 MG/DL — LOW (ref 8.4–10.5)
CHLORIDE SERPL-SCNC: 102 MMOL/L — SIGNIFICANT CHANGE UP (ref 96–108)
CO2 SERPL-SCNC: 29 MMOL/L — SIGNIFICANT CHANGE UP (ref 22–31)
CREAT SERPL-MCNC: 0.59 MG/DL — SIGNIFICANT CHANGE UP (ref 0.5–1.3)
EOSINOPHIL # BLD AUTO: 0.58 K/UL — HIGH (ref 0–0.5)
EOSINOPHIL NFR BLD AUTO: 5.1 % — SIGNIFICANT CHANGE UP (ref 0–6)
GLUCOSE BLDC GLUCOMTR-MCNC: 116 MG/DL — HIGH (ref 70–99)
GLUCOSE BLDC GLUCOMTR-MCNC: 118 MG/DL — HIGH (ref 70–99)
GLUCOSE BLDC GLUCOMTR-MCNC: 138 MG/DL — HIGH (ref 70–99)
GLUCOSE BLDC GLUCOMTR-MCNC: 161 MG/DL — HIGH (ref 70–99)
GLUCOSE SERPL-MCNC: 155 MG/DL — HIGH (ref 70–99)
HCT VFR BLD CALC: 25 % — LOW (ref 34.5–45)
HGB BLD-MCNC: 7.6 G/DL — LOW (ref 11.5–15.5)
IMM GRANULOCYTES NFR BLD AUTO: 1.1 % — SIGNIFICANT CHANGE UP (ref 0–1.5)
LYMPHOCYTES # BLD AUTO: 0.93 K/UL — LOW (ref 1–3.3)
LYMPHOCYTES # BLD AUTO: 8.2 % — LOW (ref 13–44)
MACROCYTES BLD QL: SLIGHT — SIGNIFICANT CHANGE UP
MAGNESIUM SERPL-MCNC: 1.4 MG/DL — LOW (ref 1.6–2.6)
MANUAL SMEAR VERIFICATION: SIGNIFICANT CHANGE UP
MCHC RBC-ENTMCNC: 28.5 PG — SIGNIFICANT CHANGE UP (ref 27–34)
MCHC RBC-ENTMCNC: 30.4 GM/DL — LOW (ref 32–36)
MCV RBC AUTO: 93.6 FL — SIGNIFICANT CHANGE UP (ref 80–100)
MICROCYTES BLD QL: SLIGHT — SIGNIFICANT CHANGE UP
MONOCYTES # BLD AUTO: 0.87 K/UL — SIGNIFICANT CHANGE UP (ref 0–0.9)
MONOCYTES NFR BLD AUTO: 7.7 % — SIGNIFICANT CHANGE UP (ref 2–14)
NEUTROPHILS # BLD AUTO: 8.8 K/UL — HIGH (ref 1.8–7.4)
NEUTROPHILS NFR BLD AUTO: 77.4 % — HIGH (ref 43–77)
PHOSPHATE SERPL-MCNC: 3.1 MG/DL — SIGNIFICANT CHANGE UP (ref 2.5–4.5)
PLAT MORPH BLD: NORMAL — SIGNIFICANT CHANGE UP
PLATELET # BLD AUTO: 419 K/UL — HIGH (ref 150–400)
POLYCHROMASIA BLD QL SMEAR: SLIGHT — SIGNIFICANT CHANGE UP
POTASSIUM SERPL-MCNC: 4.4 MMOL/L — SIGNIFICANT CHANGE UP (ref 3.5–5.3)
POTASSIUM SERPL-SCNC: 4.4 MMOL/L — SIGNIFICANT CHANGE UP (ref 3.5–5.3)
RBC # BLD: 2.67 M/UL — LOW (ref 3.8–5.2)
RBC # FLD: 16.5 % — HIGH (ref 10.3–14.5)
RBC BLD AUTO: ABNORMAL
SODIUM SERPL-SCNC: 139 MMOL/L — SIGNIFICANT CHANGE UP (ref 135–145)
WBC # BLD: 11.37 K/UL — HIGH (ref 3.8–10.5)
WBC # FLD AUTO: 11.37 K/UL — HIGH (ref 3.8–10.5)

## 2018-09-07 PROCEDURE — 99232 SBSQ HOSP IP/OBS MODERATE 35: CPT

## 2018-09-07 RX ORDER — ACETAMINOPHEN 500 MG
500 TABLET ORAL ONCE
Qty: 0 | Refills: 0 | Status: COMPLETED | OUTPATIENT
Start: 2018-09-07 | End: 2018-09-07

## 2018-09-07 RX ORDER — CLONAZEPAM 1 MG
2 TABLET ORAL ONCE
Qty: 0 | Refills: 0 | Status: DISCONTINUED | OUTPATIENT
Start: 2018-09-07 | End: 2018-09-07

## 2018-09-07 RX ADMIN — Medication 500000 UNIT(S): at 23:09

## 2018-09-07 RX ADMIN — Medication 0.5 MILLIGRAM(S): at 13:05

## 2018-09-07 RX ADMIN — OXYCODONE AND ACETAMINOPHEN 1 TABLET(S): 5; 325 TABLET ORAL at 21:30

## 2018-09-07 RX ADMIN — Medication 500 MILLIGRAM(S): at 16:30

## 2018-09-07 RX ADMIN — Medication 500000 UNIT(S): at 00:42

## 2018-09-07 RX ADMIN — Medication 3 MILLILITER(S): at 13:05

## 2018-09-07 RX ADMIN — LIDOCAINE 1 PATCH: 4 CREAM TOPICAL at 10:56

## 2018-09-07 RX ADMIN — ENOXAPARIN SODIUM 40 MILLIGRAM(S): 100 INJECTION SUBCUTANEOUS at 13:05

## 2018-09-07 RX ADMIN — Medication 3 MILLILITER(S): at 23:15

## 2018-09-07 RX ADMIN — Medication 500000 UNIT(S): at 13:05

## 2018-09-07 RX ADMIN — Medication 500000 UNIT(S): at 19:07

## 2018-09-07 RX ADMIN — Medication 0.5 MILLIGRAM(S): at 00:41

## 2018-09-07 RX ADMIN — Medication 1: at 06:42

## 2018-09-07 RX ADMIN — PANTOPRAZOLE SODIUM 40 MILLIGRAM(S): 20 TABLET, DELAYED RELEASE ORAL at 13:05

## 2018-09-07 RX ADMIN — MIRTAZAPINE 45 MILLIGRAM(S): 45 TABLET, ORALLY DISINTEGRATING ORAL at 22:16

## 2018-09-07 RX ADMIN — SERTRALINE 50 MILLIGRAM(S): 25 TABLET, FILM COATED ORAL at 13:05

## 2018-09-07 RX ADMIN — Medication 500000 UNIT(S): at 06:40

## 2018-09-07 RX ADMIN — Medication 10 MILLIGRAM(S): at 00:41

## 2018-09-07 RX ADMIN — CHLORHEXIDINE GLUCONATE 1 APPLICATION(S): 213 SOLUTION TOPICAL at 06:42

## 2018-09-07 RX ADMIN — LIDOCAINE 1 PATCH: 4 CREAM TOPICAL at 22:17

## 2018-09-07 RX ADMIN — Medication 0.5 MILLIGRAM(S): at 23:16

## 2018-09-07 RX ADMIN — Medication 3 MILLILITER(S): at 00:41

## 2018-09-07 RX ADMIN — Medication 2 MILLIGRAM(S): at 23:09

## 2018-09-07 RX ADMIN — Medication 3 MILLILITER(S): at 19:07

## 2018-09-07 RX ADMIN — QUETIAPINE FUMARATE 50 MILLIGRAM(S): 200 TABLET, FILM COATED ORAL at 22:16

## 2018-09-07 RX ADMIN — Medication 3 MILLILITER(S): at 06:41

## 2018-09-07 RX ADMIN — HUMAN INSULIN 5 UNIT(S): 100 INJECTION, SUSPENSION SUBCUTANEOUS at 00:43

## 2018-09-07 RX ADMIN — Medication 100 MILLIGRAM(S): at 09:21

## 2018-09-07 RX ADMIN — Medication 200 MILLIGRAM(S): at 16:18

## 2018-09-07 RX ADMIN — OXYCODONE AND ACETAMINOPHEN 1 TABLET(S): 5; 325 TABLET ORAL at 21:00

## 2018-09-07 NOTE — PROGRESS NOTE ADULT - SUBJECTIVE AND OBJECTIVE BOX
Chief Complaint: Evaluating this 69 y/o F for steroid induced hyperglycemia      Interval History: Still complains of LE pain. Feeling anxious today. Remains on 24 hour tube feeds.     MEDICATIONS  (STANDING):  ALBUTerol/ipratropium for Nebulization 3 milliLiter(s) Nebulizer every 6 hours  buDESOnide   0.5 milliGRAM(s) Respule 0.5 milliGRAM(s) Inhalation every 12 hours  chlorhexidine 4% Liquid 1 Application(s) Topical <User Schedule>  dextrose 5%. 1000 milliLiter(s) (50 mL/Hr) IV Continuous <Continuous>  dextrose 50% Injectable 12.5 Gram(s) IV Push once  dextrose 50% Injectable 25 Gram(s) IV Push once  dextrose 50% Injectable 25 Gram(s) IV Push once  enoxaparin Injectable 40 milliGRAM(s) SubCutaneous daily  insulin lispro (HumaLOG) corrective regimen sliding scale   SubCutaneous every 6 hours  insulin NPH human recombinant 5 Unit(s) SubCutaneous at bedtime  lidocaine   Patch 1 Patch Transdermal every 24 hours  mirtazapine 45 milliGRAM(s) Oral <User Schedule>  nystatin    Suspension 706117 Unit(s) Oral four times a day  pantoprazole  Injectable 40 milliGRAM(s) IV Push every 24 hours  predniSONE  Solution 10 milliGRAM(s) Enteral Tube every 24 hours  QUEtiapine 50 milliGRAM(s) Oral <User Schedule>  sertraline 50 milliGRAM(s) Oral daily  vancomycin  IVPB 500 milliGRAM(s) IV Intermittent every 24 hours    MEDICATIONS  (PRN):  acetaminophen  IVPB .. 1000 milliGRAM(s) IV Intermittent every 8 hours PRN Mild Pain (1 - 3), Moderate Pain (4 - 6), Severe Pain (7 - 10)  dextrose 40% Gel 15 Gram(s) Oral once PRN Blood Glucose LESS THAN 70 milliGRAM(s)/deciLiter  glucagon  Injectable 1 milliGRAM(s) IntraMuscular once PRN Glucose <70 milliGRAM(s)/deciLiter  nystatin Powder 1 Application(s) Topical two times a day PRN rash/itchiness  oxyCODONE    5 mG/acetaminophen 325 mG 1 Tablet(s) Oral daily PRN Moderate Pain (4 - 6)      Allergies    ASA; dye contrast (Anaphylaxis)  aspirin (Short breath)  divalproex sodium (Other (Unknown))  Haldol (Other (Unknown))  penicillin (Short breath; Rash)  sulfa drugs (Short breath; Rash)  Xanax (Other (Unknown))    Intolerances      Review of Systems:  Constitutional: No fever  Eyes: No blurry vision  Cardiovascular: No chest pain  Respiratory: No SOB  GI: No abdominal pain, No nausea, No vomiting  Endocrine: as noted in HPI    All other negative      PHYSICAL EXAM:  VITALS: T(C): 36.8 (09-07-18 @ 15:03)  T(F): 98.2 (09-07-18 @ 15:03), Max: 98.2 (09-07-18 @ 08:00)  HR: 103 (09-07-18 @ 15:03) (103 - 117)  BP: 145/82 (09-07-18 @ 15:03) (98/60 - 145/82)  RR:  (18 - 18)  SpO2:  (95% - 100%)  Wt(kg): --  GENERAL: NAD at this time, cachectic  EYES: EOMI, No proptosis  HEENT:  Atraumatic, Normocephalic,   RESPIRATORY: Clear to auscultation bilaterally, full excursion, non labored  CARDIOVASCULAR: Regular rhythm; normal S1/S2, no peripheral edema  GI: Soft, nontender, non distended, normal bowel sounds  SKIN: Warm and dry  PSYCH: normal affect, normal mood      POCT Blood Glucose.: 116 mg/dL (09-07-18 @ 12:44)  POCT Blood Glucose.: 161 mg/dL (09-07-18 @ 06:30)  POCT Blood Glucose.: 138 mg/dL (09-07-18 @ 00:18)  POCT Blood Glucose.: 115 mg/dL (09-06-18 @ 19:02)  POCT Blood Glucose.: 177 mg/dL (09-06-18 @ 12:10)  POCT Blood Glucose.: 177 mg/dL (09-06-18 @ 06:41)  POCT Blood Glucose.: 123 mg/dL (09-06-18 @ 00:27)  POCT Blood Glucose.: 104 mg/dL (09-05-18 @ 18:05)  POCT Blood Glucose.: 139 mg/dL (09-05-18 @ 12:30)  POCT Blood Glucose.: 229 mg/dL (09-05-18 @ 06:35)  POCT Blood Glucose.: 129 mg/dL (09-05-18 @ 00:19)  POCT Blood Glucose.: 99 mg/dL (09-04-18 @ 18:19)        09-07    139  |  102  |  26<H>  ----------------------------<  155<H>  4.4   |  29  |  0.59    EGFR if : 109  EGFR if non : 94    Ca    8.2<L>      09-07  Mg     1.4     09-07  Phos  3.1     09-07          Thyroid Function Tests:      Hemoglobin A1C, Whole Blood: 5.5 % [4.0 - 5.6] (07-27-18 @ 09:02)

## 2018-09-07 NOTE — PROGRESS NOTE ADULT - SUBJECTIVE AND OBJECTIVE BOX
---___---___---___---___---___---___ ---___---___---___---___---___---___---___---___---___---                    <<<  M E D I C A L   A T T E N D I N G    F O L L O W    U P   N O T E  >>>    still having pain in the left leg . most likely from the acetabular fracture still unable to bear weight    ---___---___---___---___---___---      <<<  MEDICATIONS:  >>>    MEDICATIONS  (STANDING):  ALBUTerol/ipratropium for Nebulization 3 milliLiter(s) Nebulizer every 6 hours  buDESOnide   0.5 milliGRAM(s) Respule 0.5 milliGRAM(s) Inhalation every 12 hours  chlorhexidine 4% Liquid 1 Application(s) Topical <User Schedule>  dextrose 5%. 1000 milliLiter(s) (50 mL/Hr) IV Continuous <Continuous>  dextrose 50% Injectable 12.5 Gram(s) IV Push once  dextrose 50% Injectable 25 Gram(s) IV Push once  dextrose 50% Injectable 25 Gram(s) IV Push once  enoxaparin Injectable 40 milliGRAM(s) SubCutaneous daily  insulin lispro (HumaLOG) corrective regimen sliding scale   SubCutaneous every 6 hours  insulin NPH human recombinant 5 Unit(s) SubCutaneous at bedtime  lidocaine   Patch 1 Patch Transdermal every 24 hours  mirtazapine 45 milliGRAM(s) Oral <User Schedule>  nystatin    Suspension 063586 Unit(s) Oral four times a day  pantoprazole  Injectable 40 milliGRAM(s) IV Push every 24 hours  predniSONE  Solution 10 milliGRAM(s) Enteral Tube every 24 hours  QUEtiapine 50 milliGRAM(s) Oral <User Schedule>  sertraline 50 milliGRAM(s) Oral daily  vancomycin  IVPB 500 milliGRAM(s) IV Intermittent every 24 hours      MEDICATIONS  (PRN):  acetaminophen  IVPB .. 1000 milliGRAM(s) IV Intermittent every 8 hours PRN Mild Pain (1 - 3), Moderate Pain (4 - 6), Severe Pain (7 - 10)  dextrose 40% Gel 15 Gram(s) Oral once PRN Blood Glucose LESS THAN 70 milliGRAM(s)/deciLiter  glucagon  Injectable 1 milliGRAM(s) IntraMuscular once PRN Glucose <70 milliGRAM(s)/deciLiter  nystatin Powder 1 Application(s) Topical two times a day PRN rash/itchiness  oxyCODONE    5 mG/acetaminophen 325 mG 1 Tablet(s) Oral daily PRN Moderate Pain (4 - 6)       ---___---___---___---___---___---     <<<REVIEW OF SYSTEM: >>>        ---___---___---___---___---___---          <<<  VITAL SIGNS: >>>    T(F): 97.4 (09-07-18 @ 07:05), Max: 98.6 (09-06-18 @ 08:00)  HR: 105 (09-07-18 @ 07:05) (101 - 117)  BP: 105/71 (09-07-18 @ 07:05) (98/60 - 140/82)  RR: 18 (09-07-18 @ 07:05) (18 - 18)  SpO2: 100% (09-07-18 @ 07:05) (95% - 100%)  Wt(kg): --  CAPILLARY BLOOD GLUCOSE      POCT Blood Glucose.: 161 mg/dL (07 Sep 2018 06:30)    I&O's Summary    06 Sep 2018 07:01  -  07 Sep 2018 07:00  --------------------------------------------------------  IN: 520 mL / OUT: 850 mL / NET: -330 mL         ---___---___---___---___---___---                       PHYSICAL EXAM:    GEN: A&O X 2 , NAD , comfortable  HEENT: NCAT, PERRL, MMM, no scleral icterus, hearing intact  NECK: Supple, No JVD  CVS: S1S2 , regular , No M/R/G appreciated  PULM: CTA B/L,  no W/R/R appreciated  ABD.: soft. non tender, non distended,  bowel sounds present  Extrem: intact pulses , no edema noted  Derm: No rash or ecchymosis noted  PSYCH: normal mood, no depression, not anxious     ---___---___---___---___---___---     <<<  LAB AND IMAGING: >>>                          8.2    10.22 )-----------( 385      ( 06 Sep 2018 09:17 )             25.4               09-07    139  |  102  |  26<H>  ----------------------------<  155<H>  4.4   |  29  |  0.59    Ca    8.2<L>      07 Sep 2018 07:02  Phos  3.1     09-07  Mg     1.4     09-07                                 [All pertinent / recent available Imaging reports and other labs reviewed]     ---___---___---___---___---___---___ ---___---___---___---___---           <<<  A S S E S S M E N T   A N D   P L A N :  >>>          -GI/DVT Prophylaxis.    --------------------------------------------  Case discussed with   Education given on     >>______________________<<      Deniz Bolden .         phone   [phone]

## 2018-09-07 NOTE — PROGRESS NOTE ADULT - SUBJECTIVE AND OBJECTIVE BOX
CC: f/u for  mrsa prosthetic hip infection and bacteremia  Patient reports  she is ok. had a loose bm but not smelling like cdif  REVIEW OF SYSTEMS:  All other review of systems negative (Comprehensive ROS)    Antimicrobials Day #  :34/42  nystatin    Suspension 433365 Unit(s) Oral four times a day  vancomycin  IVPB 500 milliGRAM(s) IV Intermittent every 24 hours    Other Medications Reviewed    T(F): 98.2 (09-07-18 @ 14:00), Max: 98.2 (09-07-18 @ 08:00)  HR: 112 (09-07-18 @ 14:00)  BP: 130/76 (09-07-18 @ 14:00)  RR: 18 (09-07-18 @ 14:00)  SpO2: 95% (09-07-18 @ 15:00)  Wt(kg): --    PHYSICAL EXAM:  General: alert, no acute distress  Eyes:  anicteric, no conjunctival injection, no discharge  Oropharynx: no lesions or injection 	  Neck: supple, without adenopathy  Lungs: course to auscultation  Heart: regular rate and rhythm; no murmur, rubs or gallops  Abdomen: soft, nondistended, nontender, without mass or organomegaly  Skin: no lesions  Extremities: right leg wound clean  Neurologic: awake, moves all extremities    LAB RESULTS:                        7.6    11.37 )-----------( 419      ( 07 Sep 2018 07:46 )             25.0     09-07    139  |  102  |  26<H>  ----------------------------<  155<H>  4.4   |  29  |  0.59    Ca    8.2<L>      07 Sep 2018 07:02  Phos  3.1     09-07  Mg     1.4     09-07          MICROBIOLOGY:  RECENT CULTURES:  Culture - Blood (08.28.18 @ 05:35)    Specimen Source: .Blood Blood-Peripheral    Culture Results:   No growth at 5 days.        RADIOLOGY REVIEWED:  < from: Xray Femur 1 View, Left (09.05.18 @ 21:05) >  IMPRESSION:     A subacute left acetabular fracture is again noted and better   characterized on the prior CT.    Sclerotic lesion in the distal femoral metadiaphysis, consistent with a   chondroid lesion. No aggressive features are demonstrated on this limited   view.    < end of copied text >    Assessment:  Patient with mrsa prosthetic hip infection s/p washout then leisa, spacer, stormy post op course with episodes of respiratory failure and sepsis maybe from pneumonia now just on treatment for hip infection with vanco. She has severe dementia.   Plan:  continue vancomycin for 8 more days  supportive care

## 2018-09-07 NOTE — PROGRESS NOTE ADULT - SUBJECTIVE AND OBJECTIVE BOX
INTERVAL HPI/OVERNIGHT EVENTS: comfortable, tolerating feeds, no GI issues    MEDICATIONS  (STANDING):  ALBUTerol/ipratropium for Nebulization 3 milliLiter(s) Nebulizer every 6 hours  buDESOnide   0.5 milliGRAM(s) Respule 0.5 milliGRAM(s) Inhalation every 12 hours  chlorhexidine 4% Liquid 1 Application(s) Topical <User Schedule>  dextrose 5%. 1000 milliLiter(s) (50 mL/Hr) IV Continuous <Continuous>  dextrose 50% Injectable 12.5 Gram(s) IV Push once  dextrose 50% Injectable 25 Gram(s) IV Push once  dextrose 50% Injectable 25 Gram(s) IV Push once  enoxaparin Injectable 40 milliGRAM(s) SubCutaneous daily  insulin lispro (HumaLOG) corrective regimen sliding scale   SubCutaneous every 6 hours  insulin NPH human recombinant 5 Unit(s) SubCutaneous at bedtime  lidocaine   Patch 1 Patch Transdermal every 24 hours  mirtazapine 45 milliGRAM(s) Oral <User Schedule>  nystatin    Suspension [id number] Unit(s) Oral four times a day  pantoprazole  Injectable 40 milliGRAM(s) IV Push every 24 hours  predniSONE  Solution 10 milliGRAM(s) Enteral Tube every 24 hours  QUEtiapine 50 milliGRAM(s) Oral <User Schedule>  sertraline 50 milliGRAM(s) Oral daily  vancomycin  IVPB 500 milliGRAM(s) IV Intermittent every 24 hours    MEDICATIONS  (PRN):  acetaminophen  IVPB .. 1000 milliGRAM(s) IV Intermittent every 8 hours PRN Mild Pain (1 - 3), Moderate Pain (4 - 6), Severe Pain (7 - 10)  dextrose 40% Gel 15 Gram(s) Oral once PRN Blood Glucose LESS THAN 70 milliGRAM(s)/deciLiter  glucagon  Injectable 1 milliGRAM(s) IntraMuscular once PRN Glucose <70 milliGRAM(s)/deciLiter  nystatin Powder 1 Application(s) Topical two times a day PRN rash/itchiness  oxyCODONE    5 mG/acetaminophen 325 mG 1 Tablet(s) Oral daily PRN Moderate Pain (4 - 6)      Allergies    ASA; dye contrast (Anaphylaxis)  aspirin (Short breath)  divalproex sodium (Other (Unknown))  Haldol (Other (Unknown))  penicillin (Short breath; Rash)  sulfa drugs (Short breath; Rash)  Xanax (Other (Unknown))    Intolerances            PHYSICAL EXAM:   Vital Signs:  Vital Signs Last 24 Hrs  T(C): 36.3 (07 Sep 2018 07:05), Max: 36.8 (06 Sep 2018 13:00)  T(F): 97.4 (07 Sep 2018 07:05), Max: 98.2 (06 Sep 2018 13:00)  HR: 105 (07 Sep 2018 07:05) (101 - 117)  BP: 105/71 (07 Sep 2018 07:05) (98/60 - 140/82)  BP(mean): --  RR: 18 (07 Sep 2018 07:05) (18 - 18)  SpO2: 100% (07 Sep 2018 07:05) (95% - 100%)  Daily     Daily     GENERAL:  no distress  HEENT:  NC/AT,  anicteric  CHEST:   no increased effort, breath sounds clear  HEART:  Regular rhythm  ABDOMEN:  Soft, non-tender, non-distended, normoactive bowel sounds,  no masses ,no hepato-splenomegaly,  peg site clean    LABS:                        8.2    10.22 )-----------( 385      ( 06 Sep 2018 09:17 )             25.4     09-07    139  |  102  |  26<H>  ----------------------------<  155<H>  4.4   |  29  |  0.59    Ca    8.2<L>      07 Sep 2018 07:02  Phos  3.1     09-07  Mg     1.4     09-07            RADIOLOGY & ADDITIONAL TESTS:

## 2018-09-07 NOTE — PROGRESS NOTE ADULT - ASSESSMENT
pelvic fracture  infected right hip hardware  mrsa sepsis  alzheimers   anxiety   sacracl ulcer   wound dehiscence

## 2018-09-08 LAB
GLUCOSE BLDC GLUCOMTR-MCNC: 108 MG/DL — HIGH (ref 70–99)
GLUCOSE BLDC GLUCOMTR-MCNC: 114 MG/DL — HIGH (ref 70–99)
GLUCOSE BLDC GLUCOMTR-MCNC: 143 MG/DL — HIGH (ref 70–99)
GLUCOSE BLDC GLUCOMTR-MCNC: 154 MG/DL — HIGH (ref 70–99)
GLUCOSE BLDC GLUCOMTR-MCNC: 158 MG/DL — HIGH (ref 70–99)
GLUCOSE BLDC GLUCOMTR-MCNC: 205 MG/DL — HIGH (ref 70–99)
VANCOMYCIN TROUGH SERPL-MCNC: 12.3 UG/ML — SIGNIFICANT CHANGE UP (ref 10–20)

## 2018-09-08 RX ORDER — OXYCODONE AND ACETAMINOPHEN 5; 325 MG/1; MG/1
1 TABLET ORAL EVERY 6 HOURS
Qty: 0 | Refills: 0 | Status: DISCONTINUED | OUTPATIENT
Start: 2018-09-08 | End: 2018-09-09

## 2018-09-08 RX ORDER — ACETAMINOPHEN 500 MG
1000 TABLET ORAL EVERY 8 HOURS
Qty: 0 | Refills: 0 | Status: COMPLETED | OUTPATIENT
Start: 2018-09-08 | End: 2018-09-08

## 2018-09-08 RX ORDER — ACETAMINOPHEN 500 MG
1000 TABLET ORAL ONCE
Qty: 0 | Refills: 0 | Status: COMPLETED | OUTPATIENT
Start: 2018-09-08 | End: 2018-09-08

## 2018-09-08 RX ORDER — CLONAZEPAM 1 MG
2 TABLET ORAL
Qty: 0 | Refills: 0 | Status: DISCONTINUED | OUTPATIENT
Start: 2018-09-08 | End: 2018-09-15

## 2018-09-08 RX ADMIN — Medication 2 MILLIGRAM(S): at 22:28

## 2018-09-08 RX ADMIN — Medication 2: at 06:42

## 2018-09-08 RX ADMIN — Medication 1000 MILLIGRAM(S): at 15:00

## 2018-09-08 RX ADMIN — PANTOPRAZOLE SODIUM 40 MILLIGRAM(S): 20 TABLET, DELAYED RELEASE ORAL at 11:44

## 2018-09-08 RX ADMIN — Medication 10 MILLIGRAM(S): at 00:33

## 2018-09-08 RX ADMIN — HUMAN INSULIN 5 UNIT(S): 100 INJECTION, SUSPENSION SUBCUTANEOUS at 00:34

## 2018-09-08 RX ADMIN — OXYCODONE AND ACETAMINOPHEN 1 TABLET(S): 5; 325 TABLET ORAL at 18:18

## 2018-09-08 RX ADMIN — LIDOCAINE 1 PATCH: 4 CREAM TOPICAL at 14:01

## 2018-09-08 RX ADMIN — Medication 500000 UNIT(S): at 05:58

## 2018-09-08 RX ADMIN — QUETIAPINE FUMARATE 50 MILLIGRAM(S): 200 TABLET, FILM COATED ORAL at 22:28

## 2018-09-08 RX ADMIN — SERTRALINE 50 MILLIGRAM(S): 25 TABLET, FILM COATED ORAL at 11:44

## 2018-09-08 RX ADMIN — Medication 400 MILLIGRAM(S): at 14:45

## 2018-09-08 RX ADMIN — CHLORHEXIDINE GLUCONATE 1 APPLICATION(S): 213 SOLUTION TOPICAL at 06:16

## 2018-09-08 RX ADMIN — Medication 400 MILLIGRAM(S): at 06:06

## 2018-09-08 RX ADMIN — Medication 3 MILLILITER(S): at 05:57

## 2018-09-08 RX ADMIN — Medication 3 MILLILITER(S): at 11:44

## 2018-09-08 RX ADMIN — Medication 3 MILLILITER(S): at 17:49

## 2018-09-08 RX ADMIN — Medication 100 MILLIGRAM(S): at 09:02

## 2018-09-08 RX ADMIN — ENOXAPARIN SODIUM 40 MILLIGRAM(S): 100 INJECTION SUBCUTANEOUS at 11:44

## 2018-09-08 RX ADMIN — Medication 1000 MILLIGRAM(S): at 06:21

## 2018-09-08 RX ADMIN — MIRTAZAPINE 45 MILLIGRAM(S): 45 TABLET, ORALLY DISINTEGRATING ORAL at 22:28

## 2018-09-08 RX ADMIN — OXYCODONE AND ACETAMINOPHEN 1 TABLET(S): 5; 325 TABLET ORAL at 17:49

## 2018-09-08 RX ADMIN — Medication 500000 UNIT(S): at 17:49

## 2018-09-08 RX ADMIN — LIDOCAINE 1 PATCH: 4 CREAM TOPICAL at 22:28

## 2018-09-08 RX ADMIN — Medication 0.5 MILLIGRAM(S): at 11:44

## 2018-09-08 NOTE — PROGRESS NOTE ADULT - ASSESSMENT
General Surgery Week 2 Survey      Responses   Facility patient discharged from?  [facility]   Does the patient have one of the following disease processes/diagnoses(primary or secondary)?  General Surgery   Week 2 attempt successful?  Yes   Call start time  1330   Call end time  1338   Discharge diagnosis  AAA repair - femoral artery approach   Meds reviewed with patient/caregiver?  Yes   Is the patient having any side effects they believe may be caused by any medication additions or changes?  No   Does the patient have all medications related to this admission filled (includes all antibiotics, pain medications, etc.)  Yes   Is the patient taking all medications as directed (includes completed medication regime)?  Yes   Medication comments  Not requiring pain meds.   Does the patient have a follow up appointment scheduled with their surgeon?  Yes   Has the patient kept scheduled appointments due by today?  Yes   Comments  He has seen his surgeon.   Has home health visited the patient within 72 hours of discharge?  N/A   Psychosocial issues?  No   Comments  Pt states that he has an eye infection. He has been using warm salt water. He called insurance and they gave him an [facility] but it is closed now. He knows that he can go to the [facility] etc.. but prefers not to do it today. States he will go tomorrow. No issues with surgical wounds.   Did the patient receive a copy of their discharge instructions?  Yes   Nursing interventions  Reviewed instructions with patient   What is the patient's perception of their health status since discharge?  Improving   Nursing interventions  Nurse provided patient education   Is the patient /caregiver able to teach back basic post-op care?  Drive as instructed by MD in discharge instructions, Take showers only when approved by MD-sponge bathe until then   Is the patient/caregiver able to teach back signs and symptoms of incisional infection?  Increased redness, swelling or pain at  the incisonal site, Increased drainage or bleeding, Incisional warmth, Fever, Pus or odor from incision   Is the patient/caregiver able to teach back steps to recovery at home?  Rest and rebuild strength, gradually increase activity, Eat a well-balance diet   Is the patient/caregiver able to teach back the hierarchy of who to call/visit for symptoms/problems? PCP, Specialist, Home health nurse, Urgent Care, ED, 911  Yes   Week 2 call completed?  Yes          Jose L Land RN   contue fees

## 2018-09-08 NOTE — PROGRESS NOTE ADULT - SUBJECTIVE AND OBJECTIVE BOX
---___---___---___---___---___---___ ---___---___---___---___---___---___---___---___---___---                    <<<  M E D I C A L   A T T E N D I N G    F O L L O W    U P   N O T E  >>>    pain upon changing position and had diarrhea but ha s since resolved. otherwise has been stable    ---___---___---___---___---___---      <<<  MEDICATIONS:  >>>    MEDICATIONS  (STANDING):  ALBUTerol/ipratropium for Nebulization 3 milliLiter(s) Nebulizer every 6 hours  buDESOnide   0.5 milliGRAM(s) Respule 0.5 milliGRAM(s) Inhalation every 12 hours  chlorhexidine 4% Liquid 1 Application(s) Topical <User Schedule>  clonazePAM Tablet 2 milliGRAM(s) Oral <User Schedule>  dextrose 5%. 1000 milliLiter(s) (50 mL/Hr) IV Continuous <Continuous>  dextrose 50% Injectable 12.5 Gram(s) IV Push once  dextrose 50% Injectable 25 Gram(s) IV Push once  dextrose 50% Injectable 25 Gram(s) IV Push once  enoxaparin Injectable 40 milliGRAM(s) SubCutaneous daily  insulin lispro (HumaLOG) corrective regimen sliding scale   SubCutaneous every 6 hours  insulin NPH human recombinant 5 Unit(s) SubCutaneous at bedtime  lidocaine   Patch 1 Patch Transdermal every 24 hours  mirtazapine 45 milliGRAM(s) Oral <User Schedule>  nystatin    Suspension [id number] Unit(s) Oral four times a day  pantoprazole  Injectable 40 milliGRAM(s) IV Push every 24 hours  predniSONE  Solution 10 milliGRAM(s) Enteral Tube every 24 hours  QUEtiapine 50 milliGRAM(s) Oral <User Schedule>  sertraline 50 milliGRAM(s) Oral daily  vancomycin  IVPB 500 milliGRAM(s) IV Intermittent every 24 hours      MEDICATIONS  (PRN):  acetaminophen  IVPB .. 1000 milliGRAM(s) IV Intermittent every 8 hours PRN Mild Pain (1 - 3), Moderate Pain (4 - 6), Severe Pain (7 - 10)  dextrose 40% Gel 15 Gram(s) Oral once PRN Blood Glucose LESS THAN 70 milliGRAM(s)/deciLiter  glucagon  Injectable 1 milliGRAM(s) IntraMuscular once PRN Glucose <70 milliGRAM(s)/deciLiter  nystatin Powder 1 Application(s) Topical two times a day PRN rash/itchiness  oxyCODONE    5 mG/acetaminophen 325 mG 1 Tablet(s) Oral every 6 hours PRN Moderate Pain (4 - 6)       ---___---___---___---___---___---     <<<REVIEW OF SYSTEM: >>>    GEN: no fever, no chills, no pain  RESP: no SOB, no cough, no sputum  CVS: no chest pain, no palpitations, no edema  GI: no abdominal pain, no nausea, no vomiting, no constipation, no diarrhea  : no dysurea, no frequency  NEURO: no headache, no dizziness  PSYCH: no depression, not anxious  Derm :wound still healing  right leg      ---___---___---___---___---___---          <<<  VITAL SIGNS: >>>    T(F): 98.5 (09-08-18 @ 13:44), Max: 98.7 (09-07-18 @ 21:00)  HR: 107 (09-08-18 @ 13:44) (100 - 121)  BP: 122/75 (09-08-18 @ 13:44) (93/53 - 127/85)  RR: 16 (09-08-18 @ 13:44) (16 - 18)  SpO2: 100% (09-08-18 @ 13:44) (92% - 100%)  Wt(kg): --  CAPILLARY BLOOD GLUCOSE      POCT Blood Glucose.: 108 mg/dL (08 Sep 2018 13:42)    I&O's Summary    07 Sep 2018 07:01  -  08 Sep 2018 07:00  --------------------------------------------------------  IN: 0 mL / OUT: 1100 mL / NET: -1100 mL    08 Sep 2018 07:01  -  08 Sep 2018 16:06  --------------------------------------------------------  IN: 280 mL / OUT: 400 mL / NET: -120 mL         ---___---___---___---___---___---                       PHYSICAL EXAM:    GEN: A&O X 2 , NAD , comfortable  HEENT: NCAT, PERRL, MMM, no scleral icterus, hearing intact  NECK: Supple, No JVD  CVS: S1S2 , regular , No M/R/G appreciated  PULM: CTA B/L,  no W/R/R appreciated  ABD.: soft. non tender, non distended,  bowel sounds present peg noted   Extrem: dressing to right leg noted  Derm: No rash or ecchymosis noted  PSYCH: normal mood, no depression, not anxious     ---___---___---___---___---___---     <<<  LAB AND IMAGING: >>>                          7.6    11.37 )-----------( 419      ( 07 Sep 2018 07:46 )             25.0               09-07    139  |  102  |  26<H>  ----------------------------<  155<H>  4.4   |  29  |  0.59    Ca    8.2<L>      07 Sep 2018 07:02  Phos  3.1     09-07  Mg     1.4     09-07                                 [All pertinent / recent available Imaging reports and other labs reviewed]     ---___---___---___---___---___---___ ---___---___---___---___---           <<<  A S S E S S M E N T   A N D   P L A N :  >>>          -GI/DVT Prophylaxis.    --------------------------------------------  Case discussed with   Education given on     >>______________________<<      Deniz Bolden .         phone   [phone]

## 2018-09-08 NOTE — PROGRESS NOTE ADULT - SUBJECTIVE AND OBJECTIVE BOX
MYRIAM WHITE:[id number],   68yFemale followed for:  ASA; dye contrast (Anaphylaxis)  aspirin (Short breath)  divalproex sodium (Other (Unknown))  Haldol (Other (Unknown))  penicillin (Short breath; Rash)  sulfa drugs (Short breath; Rash)  Xanax (Other (Unknown))    PAST MEDICAL & SURGICAL HISTORY:  CVA (cerebral vascular accident)  Fatty pancreas  PNA (pneumonia)  Pulmonary HTN  IGT (impaired glucose tolerance)  Ulcerative colitis  Acid reflux  Anxiety  Depression  Mouth sores  HLD (hyperlipidemia)  Asthma  Humeral head fracture  H/O: hysterectomy  H/O cataract extraction, left  History of knee replacement    FAMILY HISTORY:  Family history of dementia (Grandparent)  Family history of colon cancer (Grandparent)    MEDICATIONS  (STANDING):  ALBUTerol/ipratropium for Nebulization 3 milliLiter(s) Nebulizer every 6 hours  buDESOnide   0.5 milliGRAM(s) Respule 0.5 milliGRAM(s) Inhalation every 12 hours  chlorhexidine 4% Liquid 1 Application(s) Topical <User Schedule>  dextrose 5%. 1000 milliLiter(s) (50 mL/Hr) IV Continuous <Continuous>  dextrose 50% Injectable 12.5 Gram(s) IV Push once  dextrose 50% Injectable 25 Gram(s) IV Push once  dextrose 50% Injectable 25 Gram(s) IV Push once  enoxaparin Injectable 40 milliGRAM(s) SubCutaneous daily  insulin lispro (HumaLOG) corrective regimen sliding scale   SubCutaneous every 6 hours  insulin NPH human recombinant 5 Unit(s) SubCutaneous at bedtime  lidocaine   Patch 1 Patch Transdermal every 24 hours  mirtazapine 45 milliGRAM(s) Oral <User Schedule>  nystatin    Suspension 283779 Unit(s) Oral four times a day  pantoprazole  Injectable 40 milliGRAM(s) IV Push every 24 hours  predniSONE  Solution 10 milliGRAM(s) Enteral Tube every 24 hours  QUEtiapine 50 milliGRAM(s) Oral <User Schedule>  sertraline 50 milliGRAM(s) Oral daily  vancomycin  IVPB 500 milliGRAM(s) IV Intermittent every 24 hours    MEDICATIONS  (PRN):  acetaminophen  IVPB .. 1000 milliGRAM(s) IV Intermittent every 8 hours PRN Mild Pain (1 - 3), Moderate Pain (4 - 6), Severe Pain (7 - 10)  dextrose 40% Gel 15 Gram(s) Oral once PRN Blood Glucose LESS THAN 70 milliGRAM(s)/deciLiter  glucagon  Injectable 1 milliGRAM(s) IntraMuscular once PRN Glucose <70 milliGRAM(s)/deciLiter  nystatin Powder 1 Application(s) Topical two times a day PRN rash/itchiness  oxyCODONE    5 mG/acetaminophen 325 mG 1 Tablet(s) Oral daily PRN Moderate Pain (4 - 6)      Vital Signs Last 24 Hrs  T(C): 36.9 (08 Sep 2018 06:39), Max: 37.1 (07 Sep 2018 21:00)  T(F): 98.4 (08 Sep 2018 06:39), Max: 98.7 (07 Sep 2018 21:00)  HR: 110 (08 Sep 2018 06:39) (103 - 121)  BP: 108/70 (08 Sep 2018 06:39) (93/53 - 145/82)  BP(mean): --  RR: 18 (08 Sep 2018 06:39) (18 - 18)  SpO2: 95% (08 Sep 2018 06:39) (92% - 100%)  nc/at  s1s2  cta  soft, nt, nd no guarding or rebound  no c/c/e    CBC Full  -  ( 07 Sep 2018 07:46 )  WBC Count : 11.37 K/uL  Hemoglobin : 7.6 g/dL  Hematocrit : 25.0 %  Platelet Count - Automated : 419 K/uL  Mean Cell Volume : 93.6 fl  Mean Cell Hemoglobin : 28.5 pg  Mean Cell Hemoglobin Concentration : 30.4 gm/dL  Auto Neutrophil # : 8.80 K/uL  Auto Lymphocyte # : 0.93 K/uL  Auto Monocyte # : 0.87 K/uL  Auto Eosinophil # : 0.58 K/uL  Auto Basophil # : 0.06 K/uL  Auto Neutrophil % : 77.4 %  Auto Lymphocyte % : 8.2 %  Auto Monocyte % : 7.7 %  Auto Eosinophil % : 5.1 %  Auto Basophil % : 0.5 %    09-07    139  |  102  |  26<H>  ----------------------------<  155<H>  4.4   |  29  |  0.59    Ca    8.2<L>      07 Sep 2018 07:02  Phos  3.1     09-07  Mg     1.4     09-07

## 2018-09-08 NOTE — PROGRESS NOTE ADULT - ASSESSMENT
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 SHe failed washout so had to have leisa and spacer. She has had multiple episodes of subsequent sepsis with respiratory failure and has gotten repeat courses of broad spectrum empiric treatment now off and out of micu.   No uncontrolled infection is apparent.   no fevers     Plan  Day 35 of 42 of antibiotics as planned   continue vancomycin for 7 more days  wound care as per ortho services

## 2018-09-08 NOTE — PROVIDER CONTACT NOTE (OTHER) - ASSESSMENT
Pt asleep,  at bedside. In no apparent distress. +O2 via NC. Continuous tube feeds via PEG. R hip dressing CDI.

## 2018-09-08 NOTE — PROGRESS NOTE ADULT - SUBJECTIVE AND OBJECTIVE BOX
CC: f/u for mrsa prosthetic right hip infection currently receiving treatment    Patient currently resting  in bed no fever     REVIEW OF SYSTEMS:  All other review of systems cannot get patient not answering further    Antimicrobials Day #  :34/42  nystatin    Suspension 752154 Unit(s) Oral four times a day  vancomycin  IVPB 500 milliGRAM(s) IV Intermittent every 24 hours    Other Medications Reviewed  Vital Signs Last 24 Hrs  T(C): 36.8 (08 Sep 2018 10:22), Max: 37.1 (07 Sep 2018 21:00)  T(F): 98.3 (08 Sep 2018 10:22), Max: 98.7 (07 Sep 2018 21:00)  HR: 100 (08 Sep 2018 10:22) (100 - 121)  BP: 106/65 (08 Sep 2018 10:22) (93/53 - 145/82)  BP(mean): --  RR: 18 (08 Sep 2018 10:22) (18 - 18)  SpO2: 96% (08 Sep 2018 10:22) (92% - 100%)  PHYSICAL EXAM:  General: awake pulling on oxygen, no acute distress  Eyes:  anicteric, no conjunctival injection, no discharge  Oropharynx: no lesions or injection 	  Neck: supple, without adenopathy  Lungs: course  to auscultation  Heart: regular rate and rhythm; no murmur, rubs or gallops  Abdomen: soft, nondistended, nontender, without mass or organomegaly  Skin: no lesions  Extreme: right hip wound is clean  Neurologic: alert, , moves all extremities    LAB RESULTS:                        7.6    11.37 )-----------( 419      ( 07 Sep 2018 07:46 )             25.0                           8.2    10.22 )-----------( 385      ( 06 Sep 2018 09:17 )             25.4     09-06    139  |  101  |  23  ----------------------------<  175<H>  4.1   |  28  |  0.60    Ca    8.1<L>      06 Sep 2018 07:28  Phos  2.8     09-06  Mg     1.2     09-06          MICROBIOLOGY:  RECENT CULTURES:  Culture - Blood (08.28.18 @ 05:35)    Specimen Source: .Blood Blood-Peripheral    Culture Results:   No growth at 5 days.        RADIOLOGY REVIEWED:    < from: Xray Femur 1 View, Left (09.05.18 @ 21:05) >    IMPRESSION:     A subacute left acetabular fracture is again noted and better   characterized on the prior CT.    Sclerotic lesion in the distal femoral metadiaphysis, consistent with a   chondroid lesion. No aggressive features are demonstrated on this limited   view.      < end of copied text >

## 2018-09-09 LAB
ANION GAP SERPL CALC-SCNC: 11 MMOL/L — SIGNIFICANT CHANGE UP (ref 5–17)
BUN SERPL-MCNC: 33 MG/DL — HIGH (ref 7–23)
CALCIUM SERPL-MCNC: 8.3 MG/DL — LOW (ref 8.4–10.5)
CHLORIDE SERPL-SCNC: 101 MMOL/L — SIGNIFICANT CHANGE UP (ref 96–108)
CO2 SERPL-SCNC: 27 MMOL/L — SIGNIFICANT CHANGE UP (ref 22–31)
CREAT SERPL-MCNC: 0.58 MG/DL — SIGNIFICANT CHANGE UP (ref 0.5–1.3)
GLUCOSE BLDC GLUCOMTR-MCNC: 124 MG/DL — HIGH (ref 70–99)
GLUCOSE BLDC GLUCOMTR-MCNC: 138 MG/DL — HIGH (ref 70–99)
GLUCOSE BLDC GLUCOMTR-MCNC: 147 MG/DL — HIGH (ref 70–99)
GLUCOSE BLDC GLUCOMTR-MCNC: 234 MG/DL — HIGH (ref 70–99)
GLUCOSE SERPL-MCNC: 210 MG/DL — HIGH (ref 70–99)
HCT VFR BLD CALC: 26.7 % — LOW (ref 34.5–45)
HGB BLD-MCNC: 8.1 G/DL — LOW (ref 11.5–15.5)
MCHC RBC-ENTMCNC: 29.1 PG — SIGNIFICANT CHANGE UP (ref 27–34)
MCHC RBC-ENTMCNC: 30.3 GM/DL — LOW (ref 32–36)
MCV RBC AUTO: 96 FL — SIGNIFICANT CHANGE UP (ref 80–100)
PLATELET # BLD AUTO: 389 K/UL — SIGNIFICANT CHANGE UP (ref 150–400)
POTASSIUM SERPL-MCNC: 4.8 MMOL/L — SIGNIFICANT CHANGE UP (ref 3.5–5.3)
POTASSIUM SERPL-SCNC: 4.8 MMOL/L — SIGNIFICANT CHANGE UP (ref 3.5–5.3)
RBC # BLD: 2.78 M/UL — LOW (ref 3.8–5.2)
RBC # FLD: 16.5 % — HIGH (ref 10.3–14.5)
SODIUM SERPL-SCNC: 139 MMOL/L — SIGNIFICANT CHANGE UP (ref 135–145)
WBC # BLD: 12.16 K/UL — HIGH (ref 3.8–10.5)
WBC # FLD AUTO: 12.16 K/UL — HIGH (ref 3.8–10.5)

## 2018-09-09 RX ORDER — OXYCODONE HYDROCHLORIDE 5 MG/1
5 TABLET ORAL
Qty: 0 | Refills: 0 | Status: DISCONTINUED | OUTPATIENT
Start: 2018-09-09 | End: 2018-09-10

## 2018-09-09 RX ORDER — ACETAMINOPHEN 500 MG
1000 TABLET ORAL EVERY 8 HOURS
Qty: 0 | Refills: 0 | Status: COMPLETED | OUTPATIENT
Start: 2018-09-09 | End: 2018-09-09

## 2018-09-09 RX ORDER — ALTEPLASE 100 MG
2 KIT INTRAVENOUS ONCE
Qty: 0 | Refills: 0 | Status: COMPLETED | OUTPATIENT
Start: 2018-09-09 | End: 2018-09-09

## 2018-09-09 RX ORDER — OXYCODONE AND ACETAMINOPHEN 5; 325 MG/1; MG/1
1 TABLET ORAL ONCE
Qty: 0 | Refills: 0 | Status: DISCONTINUED | OUTPATIENT
Start: 2018-09-09 | End: 2018-09-09

## 2018-09-09 RX ADMIN — Medication 1000 MILLIGRAM(S): at 14:15

## 2018-09-09 RX ADMIN — Medication 10 MILLIGRAM(S): at 00:28

## 2018-09-09 RX ADMIN — Medication 3 MILLILITER(S): at 11:21

## 2018-09-09 RX ADMIN — OXYCODONE AND ACETAMINOPHEN 1 TABLET(S): 5; 325 TABLET ORAL at 01:04

## 2018-09-09 RX ADMIN — Medication 500000 UNIT(S): at 11:23

## 2018-09-09 RX ADMIN — Medication 100 MILLIGRAM(S): at 07:54

## 2018-09-09 RX ADMIN — Medication 2 MILLIGRAM(S): at 21:55

## 2018-09-09 RX ADMIN — Medication 3 MILLILITER(S): at 06:55

## 2018-09-09 RX ADMIN — SERTRALINE 50 MILLIGRAM(S): 25 TABLET, FILM COATED ORAL at 11:22

## 2018-09-09 RX ADMIN — PANTOPRAZOLE SODIUM 40 MILLIGRAM(S): 20 TABLET, DELAYED RELEASE ORAL at 11:22

## 2018-09-09 RX ADMIN — OXYCODONE AND ACETAMINOPHEN 1 TABLET(S): 5; 325 TABLET ORAL at 07:54

## 2018-09-09 RX ADMIN — OXYCODONE HYDROCHLORIDE 5 MILLIGRAM(S): 5 TABLET ORAL at 18:06

## 2018-09-09 RX ADMIN — ENOXAPARIN SODIUM 40 MILLIGRAM(S): 100 INJECTION SUBCUTANEOUS at 11:23

## 2018-09-09 RX ADMIN — LIDOCAINE 1 PATCH: 4 CREAM TOPICAL at 21:55

## 2018-09-09 RX ADMIN — QUETIAPINE FUMARATE 50 MILLIGRAM(S): 200 TABLET, FILM COATED ORAL at 21:55

## 2018-09-09 RX ADMIN — Medication 500000 UNIT(S): at 06:55

## 2018-09-09 RX ADMIN — LIDOCAINE 1 PATCH: 4 CREAM TOPICAL at 10:59

## 2018-09-09 RX ADMIN — Medication 3 MILLILITER(S): at 00:28

## 2018-09-09 RX ADMIN — Medication 400 MILLIGRAM(S): at 13:57

## 2018-09-09 RX ADMIN — Medication 0.5 MILLIGRAM(S): at 11:21

## 2018-09-09 RX ADMIN — OXYCODONE AND ACETAMINOPHEN 1 TABLET(S): 5; 325 TABLET ORAL at 11:52

## 2018-09-09 RX ADMIN — ALTEPLASE 2 MILLIGRAM(S): KIT at 12:06

## 2018-09-09 RX ADMIN — Medication 500000 UNIT(S): at 17:36

## 2018-09-09 RX ADMIN — Medication 500000 UNIT(S): at 00:28

## 2018-09-09 RX ADMIN — Medication 3 MILLILITER(S): at 17:36

## 2018-09-09 RX ADMIN — CHLORHEXIDINE GLUCONATE 1 APPLICATION(S): 213 SOLUTION TOPICAL at 06:55

## 2018-09-09 RX ADMIN — OXYCODONE AND ACETAMINOPHEN 1 TABLET(S): 5; 325 TABLET ORAL at 08:24

## 2018-09-09 RX ADMIN — Medication 2: at 06:56

## 2018-09-09 RX ADMIN — OXYCODONE AND ACETAMINOPHEN 1 TABLET(S): 5; 325 TABLET ORAL at 00:34

## 2018-09-09 RX ADMIN — Medication 400 MILLIGRAM(S): at 21:56

## 2018-09-09 RX ADMIN — OXYCODONE HYDROCHLORIDE 5 MILLIGRAM(S): 5 TABLET ORAL at 17:36

## 2018-09-09 RX ADMIN — Medication 1000 MILLIGRAM(S): at 22:11

## 2018-09-09 RX ADMIN — MIRTAZAPINE 45 MILLIGRAM(S): 45 TABLET, ORALLY DISINTEGRATING ORAL at 21:55

## 2018-09-09 RX ADMIN — OXYCODONE AND ACETAMINOPHEN 1 TABLET(S): 5; 325 TABLET ORAL at 11:22

## 2018-09-09 RX ADMIN — Medication 0.5 MILLIGRAM(S): at 00:28

## 2018-09-09 RX ADMIN — HUMAN INSULIN 5 UNIT(S): 100 INJECTION, SUSPENSION SUBCUTANEOUS at 00:44

## 2018-09-09 NOTE — PROGRESS NOTE ADULT - SUBJECTIVE AND OBJECTIVE BOX
Patient resting without complaints. No events overnight.  No chest pain, SOB, N/V.    T(C): 36.8 (09-09-18 @ 06:30), Max: 36.9 (09-08-18 @ 13:44)  HR: 108 (09-09-18 @ 06:30) (100 - 112)  BP: 108/68 (09-09-18 @ 06:30) (106/65 - 137/83)  RR: 18 (09-09-18 @ 06:30) (16 - 18)  SpO2: 95% (09-09-18 @ 06:30) (95% - 100%)      Exam:  Alert and Oriented, No Acute Distress  RLE:            Hip Dressing: C/D/I, compartment soft            Motor: wiggles toes            Sensation: SILT in all dermatomes            Pulses: 2+, WWP distally.  Calf soft/compressible                                7.6    11.37 )-----------( 419      ( 07 Sep 2018 07:46 )             25.0

## 2018-09-09 NOTE — PROGRESS NOTE ADULT - ASSESSMENT
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 SHe failed washout so had to have leisa and spacer. She has had multiple episodes of subsequent sepsis with respiratory failure and has gotten repeat courses of broad spectrum empiric treatment now off and out of micu.   No uncontrolled infection is apparent.   no fevers     Plan  Day 36 of 42 of antibiotics as planned   continue vancomycin for 6 more days  wound care as per ortho services

## 2018-09-09 NOTE — PROGRESS NOTE ADULT - ASSESSMENT
A/P: 67 y/o F s/p R hip PJI ORIF    DVT ppx- Lovenox 40mg SQ Daily  RLE NWB  R hip wet to dry packing BID  Pain management prn  Tube Feeds  Contact Precautions      BRET Tamez  Orthopedic Surgery  [phone]

## 2018-09-09 NOTE — PROGRESS NOTE ADULT - SUBJECTIVE AND OBJECTIVE BOX
---___---___---___---___---___---___ ---___---___---___---___---___---___---___---___---___---                    <<<  M E D I C A L   A T T E N D I N G    F O L L O W    U P   N O T E  >>>    - Patient seen and examined by me approximately thirty minutes ago.  - In summary, patient is a 68y year old Female who origianlly presented with septicemia and pain and mrsa sepsis   - Patient today overall doing ok, comfortable, eating OK.     ---___---___---___---___---___---      <<<  MEDICATIONS:  >>>    MEDICATIONS  (STANDING):  ALBUTerol/ipratropium for Nebulization 3 milliLiter(s) Nebulizer every 6 hours  buDESOnide   0.5 milliGRAM(s) Respule 0.5 milliGRAM(s) Inhalation every 12 hours  chlorhexidine 4% Liquid 1 Application(s) Topical <User Schedule>  clonazePAM Tablet 2 milliGRAM(s) Oral <User Schedule>  dextrose 5%. 1000 milliLiter(s) (50 mL/Hr) IV Continuous <Continuous>  dextrose 50% Injectable 12.5 Gram(s) IV Push once  dextrose 50% Injectable 25 Gram(s) IV Push once  dextrose 50% Injectable 25 Gram(s) IV Push once  enoxaparin Injectable 40 milliGRAM(s) SubCutaneous daily  insulin lispro (HumaLOG) corrective regimen sliding scale   SubCutaneous every 6 hours  insulin NPH human recombinant 5 Unit(s) SubCutaneous at bedtime  lidocaine   Patch 1 Patch Transdermal every 24 hours  mirtazapine 45 milliGRAM(s) Oral <User Schedule>  nystatin    Suspension 226989 Unit(s) Oral four times a day  pantoprazole  Injectable 40 milliGRAM(s) IV Push every 24 hours  predniSONE  Solution 10 milliGRAM(s) Enteral Tube every 24 hours  QUEtiapine 50 milliGRAM(s) Oral <User Schedule>  sertraline 50 milliGRAM(s) Oral daily  vancomycin  IVPB 500 milliGRAM(s) IV Intermittent every 24 hours      MEDICATIONS  (PRN):  acetaminophen  IVPB .. 1000 milliGRAM(s) IV Intermittent every 8 hours PRN Mild Pain (1 - 3), Moderate Pain (4 - 6), Severe Pain (7 - 10)  acetaminophen  IVPB .. 1000 milliGRAM(s) IV Intermittent every 8 hours PRN Mild Pain (1 - 3), Moderate Pain (4 - 6), Severe Pain (7 - 10)  dextrose 40% Gel 15 Gram(s) Oral once PRN Blood Glucose LESS THAN 70 milliGRAM(s)/deciLiter  glucagon  Injectable 1 milliGRAM(s) IntraMuscular once PRN Glucose <70 milliGRAM(s)/deciLiter  nystatin Powder 1 Application(s) Topical two times a day PRN rash/itchiness  oxyCODONE    IR 5 milliGRAM(s) Oral four times a day PRN Moderate Pain (4 - 6)       ---___---___---___---___---___---     <<<REVIEW OF SYSTEM: >>>          ---___---___---___---___---___---          <<<  VITAL SIGNS: >>>    T(F): 98.4 (09-09-18 @ 13:29), Max: 98.4 (09-09-18 @ 13:29)  HR: 101 (09-09-18 @ 13:29) (101 - 112)  BP: 116/73 (09-09-18 @ 13:29) (108/68 - 137/83)  RR: 18 (09-09-18 @ 13:29) (18 - 18)  SpO2: 100% (09-09-18 @ 13:29) (95% - 100%)  Wt(kg): --  CAPILLARY BLOOD GLUCOSE      POCT Blood Glucose.: 147 mg/dL (09 Sep 2018 11:50)    I&O's Summary    08 Sep 2018 07:01  -  09 Sep 2018 07:00  --------------------------------------------------------  IN: 280 mL / OUT: 700 mL / NET: -420 mL    09 Sep 2018 07:01  -  09 Sep 2018 13:46  --------------------------------------------------------  IN: 0 mL / OUT: 300 mL / NET: -300 mL         ---___---___---___---___---___---                       PHYSICAL EXAM:    GEN: A&O X 2 , NAD , comfortable  HEENT: NCAT, PERRL, MMM, no scleral icterus, hearing intact  NECK: Supple, No JVD  CVS: S1S2 , regular , No M/R/G appreciated  PULM: CTA B/L,  no W/R/R appreciated  ABD.: soft. non tender, non distended,  bowel sounds present peg placed  Extrem: intact pulses , no edema noted  Derm: No rash or ecchymosis noted  PSYCH: normal mood, no depression, not anxious     ---___---___---___---___---___---     <<<  LAB AND IMAGING: >>>                          8.1    12.16 )-----------( 389      ( 09 Sep 2018 09:23 )             26.7               09-09    139  |  101  |  33<H>  ----------------------------<  210<H>  4.8   |  27  |  0.58    Ca    8.3<L>      09 Sep 2018 07:44                                 [All pertinent / recent available Imaging reports and other labs reviewed]     ---___---___---___---___---___---___ ---___---___---___---___---           <<<  A S S E S S M E N T   A N D   P L A N :  >>>          -GI/DVT Prophylaxis.    --------------------------------------------  Case discussed with   kendra roque and nurse   Education given on     >>______________________<<      Deniz Bolden .         phone   [phone]

## 2018-09-09 NOTE — PROGRESS NOTE ADULT - SUBJECTIVE AND OBJECTIVE BOX
CC: f/u for mrsa prosthetic right hip infection currently receiving treatment    Patient is awake and alert in bed no fevers    REVIEW OF SYSTEMS:  All other review of systems cannot get patient not answering further    nystatin    Suspension 899032 Unit(s) Oral four times a day  vancomycin  IVPB 500 milliGRAM(s) IV Intermittent every 24 hours    Other Medications Reviewed  Vital Signs Last 24 Hrs  T(C): 36.9 (09 Sep 2018 13:29), Max: 36.9 (09 Sep 2018 13:29)  T(F): 98.4 (09 Sep 2018 13:29), Max: 98.4 (09 Sep 2018 13:29)  HR: 101 (09 Sep 2018 13:29) (101 - 112)  BP: 116/73 (09 Sep 2018 13:29) (108/68 - 137/83)  BP(mean): --  RR: 18 (09 Sep 2018 13:29) (18 - 18)  SpO2: 100% (09 Sep 2018 13:29) (95% - 100%)  PHYSICAL EXAM:  General: awake pulling on oxygen, no acute distress  Eyes:  anicteric, no conjunctival injection, no discharge  Oropharynx: no lesions or injection 	  Neck: supple, without adenopathy  Lungs: course  to auscultation  Heart: regular rate and rhythm; no murmur, rubs or gallops  Abdomen: soft, nondistended, nontender, without mass or organomegaly  Skin: no lesions  Extreme: right hip wound is clean  Neurologic: alert, , moves all extremities    LAB RESULTS:                                   8.1    12.16 )-----------( 389      ( 09 Sep 2018 09:23 )             26.7                7.6    11.37 )-----------( 419      ( 07 Sep 2018 07:46 )             25.0                           8.2    10.22 )-----------( 385      ( 06 Sep 2018 09:17 )             25.4     09-06    139  |  101  |  23  ----------------------------<  175<H>  4.1   |  28  |  0.60    Ca    8.1<L>      06 Sep 2018 07:28  Phos  2.8     09-06  Mg     1.2     09-06          MICROBIOLOGY:  RECENT CULTURES:  Culture - Blood (08.28.18 @ 05:35)    Specimen Source: .Blood Blood-Peripheral    Culture Results:   No growth at 5 days.        RADIOLOGY REVIEWED:    < from: Xray Femur 1 View, Left (09.05.18 @ 21:05) >    IMPRESSION:     A subacute left acetabular fracture is again noted and better   characterized on the prior CT.    Sclerotic lesion in the distal femoral metadiaphysis, consistent with a   chondroid lesion. No aggressive features are demonstrated on this limited   view.      < end of copied text >

## 2018-09-09 NOTE — PROGRESS NOTE ADULT - SUBJECTIVE AND OBJECTIVE BOX
MYRIAM WHITE:[id number],   68yFemale followed for:  ASA; dye contrast (Anaphylaxis)  aspirin (Short breath)  divalproex sodium (Other (Unknown))  Haldol (Other (Unknown))  penicillin (Short breath; Rash)  sulfa drugs (Short breath; Rash)  Xanax (Other (Unknown))    PAST MEDICAL & SURGICAL HISTORY:  CVA (cerebral vascular accident)  Fatty pancreas  PNA (pneumonia)  Pulmonary HTN  IGT (impaired glucose tolerance)  Ulcerative colitis  Acid reflux  Anxiety  Depression  Mouth sores  HLD (hyperlipidemia)  Asthma  Humeral head fracture  H/O: hysterectomy  H/O cataract extraction, left  History of knee replacement    FAMILY HISTORY:  Family history of dementia (Grandparent)  Family history of colon cancer (Grandparent)    MEDICATIONS  (STANDING):  ALBUTerol/ipratropium for Nebulization 3 milliLiter(s) Nebulizer every 6 hours  buDESOnide   0.5 milliGRAM(s) Respule 0.5 milliGRAM(s) Inhalation every 12 hours  chlorhexidine 4% Liquid 1 Application(s) Topical <User Schedule>  clonazePAM Tablet 2 milliGRAM(s) Oral <User Schedule>  dextrose 5%. 1000 milliLiter(s) (50 mL/Hr) IV Continuous <Continuous>  dextrose 50% Injectable 12.5 Gram(s) IV Push once  dextrose 50% Injectable 25 Gram(s) IV Push once  dextrose 50% Injectable 25 Gram(s) IV Push once  enoxaparin Injectable 40 milliGRAM(s) SubCutaneous daily  insulin lispro (HumaLOG) corrective regimen sliding scale   SubCutaneous every 6 hours  insulin NPH human recombinant 5 Unit(s) SubCutaneous at bedtime  lidocaine   Patch 1 Patch Transdermal every 24 hours  mirtazapine 45 milliGRAM(s) Oral <User Schedule>  nystatin    Suspension 605319 Unit(s) Oral four times a day  pantoprazole  Injectable 40 milliGRAM(s) IV Push every 24 hours  predniSONE  Solution 10 milliGRAM(s) Enteral Tube every 24 hours  QUEtiapine 50 milliGRAM(s) Oral <User Schedule>  sertraline 50 milliGRAM(s) Oral daily  vancomycin  IVPB 500 milliGRAM(s) IV Intermittent every 24 hours    MEDICATIONS  (PRN):  acetaminophen  IVPB .. 1000 milliGRAM(s) IV Intermittent every 8 hours PRN Mild Pain (1 - 3), Moderate Pain (4 - 6), Severe Pain (7 - 10)  acetaminophen  IVPB .. 1000 milliGRAM(s) IV Intermittent every 8 hours PRN Mild Pain (1 - 3), Moderate Pain (4 - 6), Severe Pain (7 - 10)  dextrose 40% Gel 15 Gram(s) Oral once PRN Blood Glucose LESS THAN 70 milliGRAM(s)/deciLiter  glucagon  Injectable 1 milliGRAM(s) IntraMuscular once PRN Glucose <70 milliGRAM(s)/deciLiter  nystatin Powder 1 Application(s) Topical two times a day PRN rash/itchiness  oxyCODONE    5 mG/acetaminophen 325 mG 1 Tablet(s) Oral every 6 hours PRN Moderate Pain (4 - 6)      Vital Signs Last 24 Hrs  T(C): 36.8 (09 Sep 2018 06:30), Max: 36.9 (08 Sep 2018 13:44)  T(F): 98.2 (09 Sep 2018 06:30), Max: 98.5 (08 Sep 2018 13:44)  HR: 108 (09 Sep 2018 06:30) (107 - 112)  BP: 108/68 (09 Sep 2018 06:30) (108/68 - 137/83)  BP(mean): --  RR: 18 (09 Sep 2018 06:30) (16 - 18)  SpO2: 95% (09 Sep 2018 06:30) (95% - 100%)  nc/at  s1s2  cta  soft, nt, nd no guarding or rebound  no c/c/e    CBC Full  -  ( 09 Sep 2018 09:23 )  WBC Count : 12.16 K/uL  Hemoglobin : 8.1 g/dL  Hematocrit : 26.7 %  Platelet Count - Automated : 389 K/uL  Mean Cell Volume : 96.0 fl  Mean Cell Hemoglobin : 29.1 pg  Mean Cell Hemoglobin Concentration : 30.3 gm/dL  Auto Neutrophil # : x  Auto Lymphocyte # : x  Auto Monocyte # : x  Auto Eosinophil # : x  Auto Basophil # : x  Auto Neutrophil % : x  Auto Lymphocyte % : x  Auto Monocyte % : x  Auto Eosinophil % : x  Auto Basophil % : x    09-09    139  |  101  |  33<H>  ----------------------------<  210<H>  4.8   |  27  |  0.58    Ca    8.3<L>      09 Sep 2018 07:44

## 2018-09-10 DIAGNOSIS — S72.002S FRACTURE OF UNSPECIFIED PART OF NECK OF LEFT FEMUR, SEQUELA: ICD-10-CM

## 2018-09-10 LAB
ANION GAP SERPL CALC-SCNC: 12 MMOL/L — SIGNIFICANT CHANGE UP (ref 5–17)
APPEARANCE UR: CLEAR — SIGNIFICANT CHANGE UP
BACTERIA # UR AUTO: ABNORMAL
BILIRUB UR-MCNC: NEGATIVE — SIGNIFICANT CHANGE UP
BUN SERPL-MCNC: 34 MG/DL — HIGH (ref 7–23)
CALCIUM SERPL-MCNC: 8.7 MG/DL — SIGNIFICANT CHANGE UP (ref 8.4–10.5)
CHLORIDE SERPL-SCNC: 101 MMOL/L — SIGNIFICANT CHANGE UP (ref 96–108)
CO2 SERPL-SCNC: 28 MMOL/L — SIGNIFICANT CHANGE UP (ref 22–31)
COLOR SPEC: SIGNIFICANT CHANGE UP
CREAT SERPL-MCNC: 0.57 MG/DL — SIGNIFICANT CHANGE UP (ref 0.5–1.3)
DIFF PNL FLD: ABNORMAL
EPI CELLS # UR: 5 /HPF — SIGNIFICANT CHANGE UP
GLUCOSE BLDC GLUCOMTR-MCNC: 122 MG/DL — HIGH (ref 70–99)
GLUCOSE BLDC GLUCOMTR-MCNC: 127 MG/DL — HIGH (ref 70–99)
GLUCOSE BLDC GLUCOMTR-MCNC: 151 MG/DL — HIGH (ref 70–99)
GLUCOSE BLDC GLUCOMTR-MCNC: 170 MG/DL — HIGH (ref 70–99)
GLUCOSE BLDC GLUCOMTR-MCNC: 211 MG/DL — HIGH (ref 70–99)
GLUCOSE SERPL-MCNC: 179 MG/DL — HIGH (ref 70–99)
GLUCOSE UR QL: NEGATIVE — SIGNIFICANT CHANGE UP
HCT VFR BLD CALC: 25.9 % — LOW (ref 34.5–45)
HGB BLD-MCNC: 7.7 G/DL — LOW (ref 11.5–15.5)
HYALINE CASTS # UR AUTO: 0 /LPF — SIGNIFICANT CHANGE UP (ref 0–2)
KETONES UR-MCNC: NEGATIVE — SIGNIFICANT CHANGE UP
LEUKOCYTE ESTERASE UR-ACNC: ABNORMAL
MCHC RBC-ENTMCNC: 28.6 PG — SIGNIFICANT CHANGE UP (ref 27–34)
MCHC RBC-ENTMCNC: 29.7 GM/DL — LOW (ref 32–36)
MCV RBC AUTO: 96.3 FL — SIGNIFICANT CHANGE UP (ref 80–100)
NITRITE UR-MCNC: NEGATIVE — SIGNIFICANT CHANGE UP
PH UR: 7.5 — SIGNIFICANT CHANGE UP (ref 5–8)
PLATELET # BLD AUTO: 417 K/UL — HIGH (ref 150–400)
POTASSIUM SERPL-MCNC: 4.6 MMOL/L — SIGNIFICANT CHANGE UP (ref 3.5–5.3)
POTASSIUM SERPL-SCNC: 4.6 MMOL/L — SIGNIFICANT CHANGE UP (ref 3.5–5.3)
PROT UR-MCNC: ABNORMAL
RBC # BLD: 2.69 M/UL — LOW (ref 3.8–5.2)
RBC # FLD: 16.9 % — HIGH (ref 10.3–14.5)
RBC CASTS # UR COMP ASSIST: 21 /HPF — HIGH (ref 0–4)
SODIUM SERPL-SCNC: 141 MMOL/L — SIGNIFICANT CHANGE UP (ref 135–145)
SP GR SPEC: 1.02 — SIGNIFICANT CHANGE UP (ref 1.01–1.02)
UROBILINOGEN FLD QL: NEGATIVE — SIGNIFICANT CHANGE UP
WBC # BLD: 13.83 K/UL — HIGH (ref 3.8–10.5)
WBC # FLD AUTO: 13.83 K/UL — HIGH (ref 3.8–10.5)
WBC UR QL: 41 /HPF — HIGH (ref 0–5)

## 2018-09-10 PROCEDURE — 73700 CT LOWER EXTREMITY W/O DYE: CPT | Mod: 26,RT

## 2018-09-10 PROCEDURE — 71045 X-RAY EXAM CHEST 1 VIEW: CPT | Mod: 26

## 2018-09-10 PROCEDURE — 99232 SBSQ HOSP IP/OBS MODERATE 35: CPT | Mod: GC

## 2018-09-10 RX ORDER — HUMAN INSULIN 100 [IU]/ML
6 INJECTION, SUSPENSION SUBCUTANEOUS
Qty: 0 | Refills: 0 | Status: DISCONTINUED | OUTPATIENT
Start: 2018-09-10 | End: 2018-09-13

## 2018-09-10 RX ORDER — ACETAMINOPHEN 500 MG
1000 TABLET ORAL EVERY 8 HOURS
Qty: 0 | Refills: 0 | Status: COMPLETED | OUTPATIENT
Start: 2018-09-10 | End: 2018-09-10

## 2018-09-10 RX ORDER — MORPHINE SULFATE 50 MG/1
1 CAPSULE, EXTENDED RELEASE ORAL EVERY 6 HOURS
Qty: 0 | Refills: 0 | Status: DISCONTINUED | OUTPATIENT
Start: 2018-09-10 | End: 2018-09-14

## 2018-09-10 RX ORDER — QUETIAPINE FUMARATE 200 MG/1
25 TABLET, FILM COATED ORAL AT BEDTIME
Qty: 0 | Refills: 0 | Status: DISCONTINUED | OUTPATIENT
Start: 2018-09-10 | End: 2018-09-12

## 2018-09-10 RX ORDER — QUETIAPINE FUMARATE 200 MG/1
50 TABLET, FILM COATED ORAL AT BEDTIME
Qty: 0 | Refills: 0 | Status: DISCONTINUED | OUTPATIENT
Start: 2018-09-10 | End: 2018-09-12

## 2018-09-10 RX ORDER — ACETAMINOPHEN 500 MG
1000 TABLET ORAL ONCE
Qty: 0 | Refills: 0 | Status: COMPLETED | OUTPATIENT
Start: 2018-09-10 | End: 2018-09-10

## 2018-09-10 RX ORDER — OXYCODONE HYDROCHLORIDE 5 MG/1
10 TABLET ORAL EVERY 8 HOURS
Qty: 0 | Refills: 0 | Status: DISCONTINUED | OUTPATIENT
Start: 2018-09-10 | End: 2018-09-14

## 2018-09-10 RX ORDER — OXYCODONE HYDROCHLORIDE 5 MG/1
10 TABLET ORAL EVERY 12 HOURS
Qty: 0 | Refills: 0 | Status: DISCONTINUED | OUTPATIENT
Start: 2018-09-10 | End: 2018-09-10

## 2018-09-10 RX ORDER — CEFEPIME 1 G/1
1000 INJECTION, POWDER, FOR SOLUTION INTRAMUSCULAR; INTRAVENOUS ONCE
Qty: 0 | Refills: 0 | Status: COMPLETED | OUTPATIENT
Start: 2018-09-10 | End: 2018-09-10

## 2018-09-10 RX ADMIN — Medication 500000 UNIT(S): at 06:43

## 2018-09-10 RX ADMIN — Medication 2: at 06:51

## 2018-09-10 RX ADMIN — Medication 400 MILLIGRAM(S): at 10:32

## 2018-09-10 RX ADMIN — MIRTAZAPINE 45 MILLIGRAM(S): 45 TABLET, ORALLY DISINTEGRATING ORAL at 22:27

## 2018-09-10 RX ADMIN — CHLORHEXIDINE GLUCONATE 1 APPLICATION(S): 213 SOLUTION TOPICAL at 06:40

## 2018-09-10 RX ADMIN — Medication 3 MILLILITER(S): at 13:08

## 2018-09-10 RX ADMIN — Medication 0.5 MILLIGRAM(S): at 00:28

## 2018-09-10 RX ADMIN — Medication 3 MILLILITER(S): at 06:44

## 2018-09-10 RX ADMIN — Medication 100 MILLIGRAM(S): at 08:24

## 2018-09-10 RX ADMIN — MORPHINE SULFATE 1 MILLIGRAM(S): 50 CAPSULE, EXTENDED RELEASE ORAL at 21:04

## 2018-09-10 RX ADMIN — Medication 5 MILLIGRAM(S): at 22:29

## 2018-09-10 RX ADMIN — Medication 1000 MILLIGRAM(S): at 23:49

## 2018-09-10 RX ADMIN — Medication 500000 UNIT(S): at 00:29

## 2018-09-10 RX ADMIN — Medication 1000 MILLIGRAM(S): at 06:55

## 2018-09-10 RX ADMIN — SERTRALINE 50 MILLIGRAM(S): 25 TABLET, FILM COATED ORAL at 13:08

## 2018-09-10 RX ADMIN — Medication 400 MILLIGRAM(S): at 23:34

## 2018-09-10 RX ADMIN — LIDOCAINE 1 PATCH: 4 CREAM TOPICAL at 22:24

## 2018-09-10 RX ADMIN — OXYCODONE HYDROCHLORIDE 10 MILLIGRAM(S): 5 TABLET ORAL at 18:30

## 2018-09-10 RX ADMIN — Medication 1: at 00:29

## 2018-09-10 RX ADMIN — HUMAN INSULIN 5 UNIT(S): 100 INJECTION, SUSPENSION SUBCUTANEOUS at 00:33

## 2018-09-10 RX ADMIN — MORPHINE SULFATE 1 MILLIGRAM(S): 50 CAPSULE, EXTENDED RELEASE ORAL at 21:19

## 2018-09-10 RX ADMIN — Medication 3 MILLILITER(S): at 18:30

## 2018-09-10 RX ADMIN — OXYCODONE HYDROCHLORIDE 5 MILLIGRAM(S): 5 TABLET ORAL at 01:44

## 2018-09-10 RX ADMIN — Medication 400 MILLIGRAM(S): at 06:40

## 2018-09-10 RX ADMIN — Medication 10 MILLIGRAM(S): at 00:29

## 2018-09-10 RX ADMIN — QUETIAPINE FUMARATE 50 MILLIGRAM(S): 200 TABLET, FILM COATED ORAL at 22:25

## 2018-09-10 RX ADMIN — MORPHINE SULFATE 1 MILLIGRAM(S): 50 CAPSULE, EXTENDED RELEASE ORAL at 13:00

## 2018-09-10 RX ADMIN — Medication 1000 MILLIGRAM(S): at 10:52

## 2018-09-10 RX ADMIN — ENOXAPARIN SODIUM 40 MILLIGRAM(S): 100 INJECTION SUBCUTANEOUS at 13:08

## 2018-09-10 RX ADMIN — MORPHINE SULFATE 1 MILLIGRAM(S): 50 CAPSULE, EXTENDED RELEASE ORAL at 13:15

## 2018-09-10 RX ADMIN — Medication 2 MILLIGRAM(S): at 22:24

## 2018-09-10 RX ADMIN — PANTOPRAZOLE SODIUM 40 MILLIGRAM(S): 20 TABLET, DELAYED RELEASE ORAL at 13:08

## 2018-09-10 RX ADMIN — OXYCODONE HYDROCHLORIDE 5 MILLIGRAM(S): 5 TABLET ORAL at 01:14

## 2018-09-10 RX ADMIN — Medication 3 MILLILITER(S): at 00:28

## 2018-09-10 RX ADMIN — OXYCODONE HYDROCHLORIDE 10 MILLIGRAM(S): 5 TABLET ORAL at 19:00

## 2018-09-10 RX ADMIN — Medication 0.5 MILLIGRAM(S): at 13:08

## 2018-09-10 NOTE — PROGRESS NOTE ADULT - SUBJECTIVE AND OBJECTIVE BOX
Chief Complaint: Evaluating this 67 y/o F for steroid induced hyperglycemia      Interval History: Pt is AAO x1 , poor historian, daughter at bedside denies any new complaints  Remains on 24 hour tube feeds.     MEDICATIONS  (STANDING):  ALBUTerol/ipratropium for Nebulization 3 milliLiter(s) Nebulizer every 6 hours  buDESOnide   0.5 milliGRAM(s) Respule 0.5 milliGRAM(s) Inhalation every 12 hours  chlorhexidine 4% Liquid 1 Application(s) Topical <User Schedule>  clonazePAM Tablet 2 milliGRAM(s) Oral <User Schedule>  dextrose 5%. 1000 milliLiter(s) (50 mL/Hr) IV Continuous <Continuous>  dextrose 50% Injectable 12.5 Gram(s) IV Push once  dextrose 50% Injectable 25 Gram(s) IV Push once  dextrose 50% Injectable 25 Gram(s) IV Push once  enoxaparin Injectable 40 milliGRAM(s) SubCutaneous daily  insulin lispro (HumaLOG) corrective regimen sliding scale   SubCutaneous every 6 hours  insulin NPH human recombinant 5 Unit(s) SubCutaneous at bedtime  lidocaine   Patch 1 Patch Transdermal every 24 hours  mirtazapine 45 milliGRAM(s) Oral <User Schedule>  pantoprazole  Injectable 40 milliGRAM(s) IV Push every 24 hours  predniSONE  Solution 10 milliGRAM(s) Enteral Tube every 24 hours  QUEtiapine 50 milliGRAM(s) Oral <User Schedule>  sertraline 50 milliGRAM(s) Oral daily  vancomycin  IVPB 500 milliGRAM(s) IV Intermittent every 24 hours      Review of Systems:  Constitutional: No fever  Eyes: No blurry vision  Cardiovascular: No chest pain  Respiratory: No SOB  GI: No abdominal pain, No nausea, No vomiting  Endocrine: as noted in HPI    MEDICATIONS  (PRN):  acetaminophen  IVPB .. 1000 milliGRAM(s) IV Intermittent every 8 hours PRN Mild Pain (1 - 3), Moderate Pain (4 - 6), Severe Pain (7 - 10)  dextrose 40% Gel 15 Gram(s) Oral once PRN Blood Glucose LESS THAN 70 milliGRAM(s)/deciLiter  glucagon  Injectable 1 milliGRAM(s) IntraMuscular once PRN Glucose <70 milliGRAM(s)/deciLiter  morphine  - Injectable 1 milliGRAM(s) IV Push every 6 hours PRN breakthrough pain  nystatin Powder 1 Application(s) Topical two times a day PRN rash/itchiness  oxyCODONE    IR 10 milliGRAM(s) Oral every 8 hours PRN Severe Pain (7 - 10)      PHYSICAL EXAM:  VITALS: T(C): 36.7 (09-10-18 @ 15:40)  T(F): 98 (09-10-18 @ 15:40), Max: 98.5 (09-09-18 @ 21:49)  HR: 113 (09-10-18 @ 15:40) (105 - 114)  BP: 155/91 (09-10-18 @ 15:40) (109/67 - 155/91)  RR:  (16 - 18)  SpO2:  (97% - 100%)  Wt(kg): --  GENERAL: NAD at this time, cachectic  EYES: EOMI, No proptosis  HEENT:  Atraumatic, Normocephalic,   RESPIRATORY: Clear to auscultation bilaterally, full excursion, non labored  CARDIOVASCULAR: Regular rhythm; normal S1/S2, no peripheral edema  GI: Soft, nontender, non distended, normal bowel sounds  SKIN: Warm and dry  PSYCH: normal affect, normal mood    POCT Blood Glucose.: 127 mg/dL (09-10-18 @ 13:06)  POCT Blood Glucose.: 151 mg/dL (09-10-18 @ 11:47)  POCT Blood Glucose.: 211 mg/dL (09-10-18 @ 06:34)  POCT Blood Glucose.: 170 mg/dL (09-10-18 @ 00:24)    POCT Blood Glucose.: 124 mg/dL (09-09-18 @ 18:13)  POCT Blood Glucose.: 147 mg/dL (09-09-18 @ 11:50)  POCT Blood Glucose.: 234 mg/dL (09-09-18 @ 06:46)  POCT Blood Glucose.: 138 mg/dL (09-09-18 @ 00:42)    POCT Blood Glucose.: 114 mg/dL (09-08-18 @ 17:32)  POCT Blood Glucose.: 108 mg/dL (09-08-18 @ 13:42)  POCT Blood Glucose.: 154 mg/dL (09-08-18 @ 12:22)  POCT Blood Glucose.: 158 mg/dL (09-08-18 @ 12:11)  POCT Blood Glucose.: 205 mg/dL (09-08-18 @ 06:20)  POCT Blood Glucose.: 143 mg/dL (09-08-18 @ 00:11)    POCT Blood Glucose.: 118 mg/dL (09-07-18 @ 18:13)    09-10    141  |  101  |  34<H>  ----------------------------<  179<H>  4.6   |  28  |  0.57    EGFR if : 110  EGFR if non : 95    Ca    8.7      09-10            Thyroid Function Tests:      Hemoglobin A1C, Whole Blood: 5.5 % [4.0 - 5.6] (07-27-18 @ 09:02) Chief Complaint: Evaluating this 67 y/o F for steroid induced hyperglycemia      Interval History: Pt is AAO x1 , poor historian, daughter at bedside denies any new complaints  Remains on 24 hour tube feeds.     MEDICATIONS  (STANDING):  ALBUTerol/ipratropium for Nebulization 3 milliLiter(s) Nebulizer every 6 hours  buDESOnide   0.5 milliGRAM(s) Respule 0.5 milliGRAM(s) Inhalation every 12 hours  chlorhexidine 4% Liquid 1 Application(s) Topical <User Schedule>  clonazePAM Tablet 2 milliGRAM(s) Oral <User Schedule>  dextrose 5%. 1000 milliLiter(s) (50 mL/Hr) IV Continuous <Continuous>  dextrose 50% Injectable 12.5 Gram(s) IV Push once  dextrose 50% Injectable 25 Gram(s) IV Push once  dextrose 50% Injectable 25 Gram(s) IV Push once  enoxaparin Injectable 40 milliGRAM(s) SubCutaneous daily  insulin lispro (HumaLOG) corrective regimen sliding scale   SubCutaneous every 6 hours  insulin NPH human recombinant 5 Unit(s) SubCutaneous at bedtime  lidocaine   Patch 1 Patch Transdermal every 24 hours  mirtazapine 45 milliGRAM(s) Oral <User Schedule>  pantoprazole  Injectable 40 milliGRAM(s) IV Push every 24 hours  predniSONE  Solution 10 milliGRAM(s) Enteral Tube every 24 hours  QUEtiapine 50 milliGRAM(s) Oral <User Schedule>  sertraline 50 milliGRAM(s) Oral daily  vancomycin  IVPB 500 milliGRAM(s) IV Intermittent every 24 hours      Review of Systems:  Constitutional: No fever  Eyes: No blurry vision  Cardiovascular: No chest pain  Respiratory: No SOB  GI: No abdominal pain, No nausea, No vomiting  Endocrine: as noted in HPI    MEDICATIONS  (PRN):  acetaminophen  IVPB .. 1000 milliGRAM(s) IV Intermittent every 8 hours PRN Mild Pain (1 - 3), Moderate Pain (4 - 6), Severe Pain (7 - 10)  dextrose 40% Gel 15 Gram(s) Oral once PRN Blood Glucose LESS THAN 70 milliGRAM(s)/deciLiter  glucagon  Injectable 1 milliGRAM(s) IntraMuscular once PRN Glucose <70 milliGRAM(s)/deciLiter  morphine  - Injectable 1 milliGRAM(s) IV Push every 6 hours PRN breakthrough pain  nystatin Powder 1 Application(s) Topical two times a day PRN rash/itchiness  oxyCODONE    IR 10 milliGRAM(s) Oral every 8 hours PRN Severe Pain (7 - 10)      PHYSICAL EXAM:  VITALS: T(C): 36.7 (09-10-18 @ 15:40)  T(F): 98 (09-10-18 @ 15:40), Max: 98.5 (09-09-18 @ 21:49)  HR: 113 (09-10-18 @ 15:40) (105 - 114)  BP: 155/91 (09-10-18 @ 15:40) (109/67 - 155/91)  RR:  (16 - 18)  SpO2:  (97% - 100%)  Wt(kg): --  GENERAL: NAD at this time, cachectic  HEENT:  Atraumatic, Normocephalic,   RESPIRATORY: Clear to auscultation bilaterally, full excursion, non labored  CARDIOVASCULAR: Regular rhythm; normal S1/S2, no peripheral edema  GI: Soft, nontender, non distended, normal bowel sounds  PSYCH: +reactive affect, +bright mood    POCT Blood Glucose.: 127 mg/dL (09-10-18 @ 13:06)  POCT Blood Glucose.: 151 mg/dL (09-10-18 @ 11:47)  POCT Blood Glucose.: 211 mg/dL (09-10-18 @ 06:34)  POCT Blood Glucose.: 170 mg/dL (09-10-18 @ 00:24)    POCT Blood Glucose.: 124 mg/dL (09-09-18 @ 18:13)  POCT Blood Glucose.: 147 mg/dL (09-09-18 @ 11:50)  POCT Blood Glucose.: 234 mg/dL (09-09-18 @ 06:46)  POCT Blood Glucose.: 138 mg/dL (09-09-18 @ 00:42)    POCT Blood Glucose.: 114 mg/dL (09-08-18 @ 17:32)  POCT Blood Glucose.: 108 mg/dL (09-08-18 @ 13:42)  POCT Blood Glucose.: 154 mg/dL (09-08-18 @ 12:22)  POCT Blood Glucose.: 158 mg/dL (09-08-18 @ 12:11)  POCT Blood Glucose.: 205 mg/dL (09-08-18 @ 06:20)  POCT Blood Glucose.: 143 mg/dL (09-08-18 @ 00:11)    POCT Blood Glucose.: 118 mg/dL (09-07-18 @ 18:13)    09-10    141  |  101  |  34<H>  ----------------------------<  179<H>  4.6   |  28  |  0.57    EGFR if : 110  EGFR if non : 95    Ca    8.7      09-10              Hemoglobin A1C, Whole Blood: 5.5 % [4.0 - 5.6] (07-27-18 @ 09:02)

## 2018-09-10 NOTE — PROGRESS NOTE ADULT - ASSESSMENT
weakness   pelvic fracture     agitation   early onset dementia   chronic pain   drug induced hyperglycemia       will add 25 mg seroquel at nigh   decrease prednisone to 5 gm   wbc  increasing get urine and blood cultuirs and cxr

## 2018-09-10 NOTE — PROGRESS NOTE ADULT - SUBJECTIVE AND OBJECTIVE BOX
MYRIAM WHITE:[id number],   68yFemale followed for:  ASA; dye contrast (Anaphylaxis)  aspirin (Short breath)  divalproex sodium (Other (Unknown))  Haldol (Other (Unknown))  penicillin (Short breath; Rash)  sulfa drugs (Short breath; Rash)  Xanax (Other (Unknown))    PAST MEDICAL & SURGICAL HISTORY:  CVA (cerebral vascular accident)  Fatty pancreas  PNA (pneumonia)  Pulmonary HTN  IGT (impaired glucose tolerance)  Ulcerative colitis  Acid reflux  Anxiety  Depression  Mouth sores  HLD (hyperlipidemia)  Asthma  Humeral head fracture  H/O: hysterectomy  H/O cataract extraction, left  History of knee replacement    FAMILY HISTORY:  Family history of dementia (Grandparent)  Family history of colon cancer (Grandparent)    MEDICATIONS  (STANDING):  ALBUTerol/ipratropium for Nebulization 3 milliLiter(s) Nebulizer every 6 hours  buDESOnide   0.5 milliGRAM(s) Respule 0.5 milliGRAM(s) Inhalation every 12 hours  chlorhexidine 4% Liquid 1 Application(s) Topical <User Schedule>  clonazePAM Tablet 2 milliGRAM(s) Oral <User Schedule>  dextrose 5%. 1000 milliLiter(s) (50 mL/Hr) IV Continuous <Continuous>  dextrose 50% Injectable 12.5 Gram(s) IV Push once  dextrose 50% Injectable 25 Gram(s) IV Push once  dextrose 50% Injectable 25 Gram(s) IV Push once  enoxaparin Injectable 40 milliGRAM(s) SubCutaneous daily  insulin lispro (HumaLOG) corrective regimen sliding scale   SubCutaneous every 6 hours  insulin NPH human recombinant 5 Unit(s) SubCutaneous at bedtime  lidocaine   Patch 1 Patch Transdermal every 24 hours  mirtazapine 45 milliGRAM(s) Oral <User Schedule>  nystatin    Suspension 045466 Unit(s) Oral four times a day  pantoprazole  Injectable 40 milliGRAM(s) IV Push every 24 hours  predniSONE  Solution 10 milliGRAM(s) Enteral Tube every 24 hours  QUEtiapine 50 milliGRAM(s) Oral <User Schedule>  sertraline 50 milliGRAM(s) Oral daily  vancomycin  IVPB 500 milliGRAM(s) IV Intermittent every 24 hours    MEDICATIONS  (PRN):  acetaminophen  IVPB .. 1000 milliGRAM(s) IV Intermittent every 8 hours PRN Mild Pain (1 - 3), Moderate Pain (4 - 6), Severe Pain (7 - 10)  dextrose 40% Gel 15 Gram(s) Oral once PRN Blood Glucose LESS THAN 70 milliGRAM(s)/deciLiter  glucagon  Injectable 1 milliGRAM(s) IntraMuscular once PRN Glucose <70 milliGRAM(s)/deciLiter  nystatin Powder 1 Application(s) Topical two times a day PRN rash/itchiness  oxyCODONE    IR 5 milliGRAM(s) Oral four times a day PRN Moderate Pain (4 - 6)      Vital Signs Last 24 Hrs  T(C): 36.8 (10 Sep 2018 04:19), Max: 36.9 (09 Sep 2018 13:29)  T(F): 98.2 (10 Sep 2018 04:19), Max: 98.5 (09 Sep 2018 21:49)  HR: 113 (10 Sep 2018 04:19) (101 - 113)  BP: 128/70 (10 Sep 2018 04:19) (113/68 - 149/86)  BP(mean): --  RR: 18 (10 Sep 2018 04:19) (18 - 18)  SpO2: 99% (10 Sep 2018 04:19) (98% - 100%)  nc/at  s1s2  cta  soft, nt, nd no guarding or rebound  no c/c/e    CBC Full  -  ( 09 Sep 2018 09:23 )  WBC Count : 12.16 K/uL  Hemoglobin : 8.1 g/dL  Hematocrit : 26.7 %  Platelet Count - Automated : 389 K/uL  Mean Cell Volume : 96.0 fl  Mean Cell Hemoglobin : 29.1 pg  Mean Cell Hemoglobin Concentration : 30.3 gm/dL  Auto Neutrophil # : x  Auto Lymphocyte # : x  Auto Monocyte # : x  Auto Eosinophil # : x  Auto Basophil # : x  Auto Neutrophil % : x  Auto Lymphocyte % : x  Auto Monocyte % : x  Auto Eosinophil % : x  Auto Basophil % : x    09-10    141  |  101  |  34<H>  ----------------------------<  179<H>  4.6   |  28  |  0.57    Ca    8.7      10 Sep 2018 04:30

## 2018-09-10 NOTE — PROGRESS NOTE ADULT - ASSESSMENT
A/P 68F s/p ORIF right femur w/ wound dehiscence with PRS consulted 8/31 for wound recommendations and re-consulted for additional recommendations  - Area of dehiscence should be managed initially with wet-to-dry packing.  This dressing was not in place on today's exam; recommend moistening 4x4 gauze sponges with saline and placing them into the incision; this will help with debridement of fibrinous exudate that is currently preventing wound healing.  Dressing must be placed INTO incision in order to aid in wound debridement.  - Patient may benefit from operative intervention to aid in wound closure; however, preoperative planning would benefit from additional imaging to rule out periprosthetic infection or other etiology of poor wound healing prior to attempting definitive closure.    please contact PRS with any additional questions  [phone]  Josy Pringle PGY3 A/P 68F s/p ORIF right femur w/ wound dehiscence with PRS consulted 8/31 for wound recommendations and re-consulted for additional recommendations  - Will attempt bedside wound debridement and VAC placement to aid in wound closure.  Will likely attempt this tomorrow, 9/11.  Please use wet to dry dressings in the interim prior to this intervention  - Please obtain imaging (CT) of RLE to evaluate for periprosthetic collection as possible etiology for delayed wound healing    please contact PRS with any additional questions  [phone]  Josy Pringle PGY3

## 2018-09-10 NOTE — PROGRESS NOTE ADULT - SUBJECTIVE AND OBJECTIVE BOX
---___---___---___---___---___---___ ---___---___---___---___---___---___---___---___---___---                    <<<  M E D I C A L   A T T E N D I N G    F O L L O W    U P   N O T E  >>>  still in pain cdespite changes to medication profile will decrease prednisone to 5mg po daily  as per endocrinology recommendation  will add 25 mg seroquel for night agitation for sleep      ---___---___---___---___---___---      <<<  MEDICATIONS:  >>>    MEDICATIONS  (STANDING):  ALBUTerol/ipratropium for Nebulization 3 milliLiter(s) Nebulizer every 6 hours  buDESOnide   0.5 milliGRAM(s) Respule 0.5 milliGRAM(s) Inhalation every 12 hours  chlorhexidine 4% Liquid 1 Application(s) Topical <User Schedule>  clonazePAM Tablet 2 milliGRAM(s) Oral <User Schedule>  dextrose 5%. 1000 milliLiter(s) (50 mL/Hr) IV Continuous <Continuous>  dextrose 50% Injectable 12.5 Gram(s) IV Push once  dextrose 50% Injectable 25 Gram(s) IV Push once  dextrose 50% Injectable 25 Gram(s) IV Push once  enoxaparin Injectable 40 milliGRAM(s) SubCutaneous daily  insulin lispro (HumaLOG) corrective regimen sliding scale   SubCutaneous every 6 hours  insulin NPH human recombinant 6 Unit(s) SubCutaneous <User Schedule>  lidocaine   Patch 1 Patch Transdermal every 24 hours  mirtazapine 45 milliGRAM(s) Oral <User Schedule>  pantoprazole  Injectable 40 milliGRAM(s) IV Push every 24 hours  predniSONE  Solution 10 milliGRAM(s) Enteral Tube every 24 hours  QUEtiapine 50 milliGRAM(s) Oral <User Schedule>  sertraline 50 milliGRAM(s) Oral daily  vancomycin  IVPB 500 milliGRAM(s) IV Intermittent every 24 hours      MEDICATIONS  (PRN):  acetaminophen  IVPB .. 1000 milliGRAM(s) IV Intermittent every 8 hours PRN Mild Pain (1 - 3), Moderate Pain (4 - 6), Severe Pain (7 - 10)  dextrose 40% Gel 15 Gram(s) Oral once PRN Blood Glucose LESS THAN 70 milliGRAM(s)/deciLiter  glucagon  Injectable 1 milliGRAM(s) IntraMuscular once PRN Glucose <70 milliGRAM(s)/deciLiter  morphine  - Injectable 1 milliGRAM(s) IV Push every 6 hours PRN breakthrough pain  nystatin Powder 1 Application(s) Topical two times a day PRN rash/itchiness  oxyCODONE    IR 10 milliGRAM(s) Oral every 8 hours PRN Severe Pain (7 - 10)       ---___---___---___---___---___---     <<<REVIEW OF SYSTEM: >>>    GEN: no fever, no chills, no pain  RESP: no SOB, no cough, no sputum  CVS: no chest pain, no palpitations, no edema  GI: no abdominal pain, no nausea, no vomiting, no constipation, no diarrhea  : no dysurea, no frequency  NEURO: no headache, no dizziness  PSYCH: no depression, not anxious  Derm : no itching, no rash     ---___---___---___---___---___---          <<<  VITAL SIGNS: >>>    T(F): 98 (09-10-18 @ 15:40), Max: 98.5 (09-09-18 @ 21:49)  HR: 113 (09-10-18 @ 15:40) (105 - 114)  BP: 155/91 (09-10-18 @ 15:40) (109/67 - 155/91)  RR: 16 (09-10-18 @ 15:40) (16 - 18)  SpO2: 99% (09-10-18 @ 15:40) (97% - 100%)  Wt(kg): --  CAPILLARY BLOOD GLUCOSE      POCT Blood Glucose.: 122 mg/dL (10 Sep 2018 18:21)    I&O's Summary    09 Sep 2018 07:01  -  10 Sep 2018 07:00  --------------------------------------------------------  IN: 895 mL / OUT: 1150 mL / NET: -255 mL    10 Sep 2018 07:01  -  10 Sep 2018 20:05  --------------------------------------------------------  IN: 0 mL / OUT: 500 mL / NET: -500 mL         ---___---___---___---___---___---                       PHYSICAL EXAM:    GEN: A&O X 2 , NAD , comfortable  HEENT: NCAT, PERRL, MMM, no scleral icterus, hearing intact  NECK: Supple, No JVD  CVS: S1S2 , regular , No M/R/G appreciated   PULM: CTA B/L,  no W/R/R appreciated  ABD.: soft. non tender, non distended,  bowel sounds present  peg noted   Extrem: intact pulses , no edema noted  Derm: dressing right leg noted   PSYCH: normal mood, no depression, not anxious     ---___---___---___---___---___---     <<<  LAB AND IMAGING: >>>                          7.7    13.83 )-----------( 417      ( 10 Sep 2018 09:29 )             25.9               09-10    141  |  101  |  34<H>  ----------------------------<  179<H>  4.6   |  28  |  0.57    Ca    8.7      10 Sep 2018 04:30                                 [All pertinent / recent available Imaging reports and other labs reviewed]     ---___---___---___---___---___---___ ---___---___---___---___---           <<<  A S S E S S M E N T   A N D   P L A N :  >>>          -GI/DVT Prophylaxis.    --------------------------------------------  Case discussed with  endcrinology and also daughter   Education given on     >>______________________<<      Deniz Bolden .         phone   [phone]

## 2018-09-10 NOTE — PROGRESS NOTE ADULT - ASSESSMENT
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 SHe failed washout so had to have leisa and spacer. She has had multiple episodes of subsequent sepsis with respiratory failure and has gotten repeat courses of broad spectrum empiric treatment now off and out of micu.   No uncontrolled infection is apparent.   She had rashes possibly to rifampin  her incision is without signs of infection  GAYATHRI was negative  Plan  continue vancomycin for 5 more days  supportive care  CT and wound vac per PRS  Will consider extending therapy with minocycline after 6 week course completed

## 2018-09-10 NOTE — PROGRESS NOTE ADULT - PROBLEM SELECTOR PLAN 1
chronic pain   ortho following   will maintain pain medication   family advised medication may reduce but not completely resolve pain

## 2018-09-10 NOTE — PROGRESS NOTE ADULT - SUBJECTIVE AND OBJECTIVE BOX
CC: f/u for infected Rt hip- MRSA    Patient reports: she appears comfortable, tolerating enteral feeds, right hip wound with areas of superficial dehiscence    REVIEW OF SYSTEMS:  All other review of systems negative (Comprehensive ROS):     Antimicrobials Day #  :day 37/42  nystatin    Suspension 566564 Unit(s) Oral four times a day  vancomycin  IVPB 500 milliGRAM(s) IV Intermittent every 24 hours    Other Medications Reviewed    T(F): 98.3 (09-10-18 @ 09:08), Max: 98.5 (09-09-18 @ 21:49)  HR: 105 (09-10-18 @ 09:08)  BP: 109/67 (09-10-18 @ 09:08)  RR: 16 (09-10-18 @ 09:08)  SpO2: 98% (09-10-18 @ 09:08)  Wt(kg): --    PHYSICAL EXAM:  General: alert, no acute distress  Eyes:  anicteric, no conjunctival injection, no discharge  Oropharynx: no lesions or injection 	  Neck: supple, without adenopathy  Lungs: clear to auscultation  Heart: regular rate and rhythm; no murmur, rubs or gallops  Abdomen: soft, nondistended, nontender, without mass or organomegaly  Skin: no lesions  Extremities: no clubbing, cyanosis, or edema  Neurologic: alert, oriented, moves all extremities  Rt hip wound is without any purulence, a few areas of dehiscence  LAB RESULTS:                        7.7    13.83 )-----------( 417      ( 10 Sep 2018 09:29 )             25.9     09-10    141  |  101  |  34<H>  ----------------------------<  179<H>  4.6   |  28  |  0.57    Ca    8.7      10 Sep 2018 04:30          MICROBIOLOGY:  RECENT CULTURES:      RADIOLOGY REVIEWED:  < from: Xray Chest 1 View- PORTABLE-Routine (09.01.18 @ 07:29) >  INTERPRETATION:  CLINICAL INFORMATION: Shortness of breath atelectasis.    A single portable view of the chest was obtained.     Comparison: 8/31/2018.     The mediastinal cardiac silhouette is unremarkable.    Left effusion and left basilar atelectasis. Right upper lobe patchy and   linear opacities, unchanged.    No acute osseous finding.     IMPRESSION:    No change from prior    < end of copied text >

## 2018-09-10 NOTE — PROGRESS NOTE ADULT - SUBJECTIVE AND OBJECTIVE BOX
Plastic Surgery    SUBJECTIVE:   Called to reevaluate patient's wound.   Per Pt and RN, wound drainage has not increased and wound has not changed significantly.    VITALS  T(C): 36.8 (09-10-18 @ 04:19), Max: 36.9 (09-09-18 @ 13:29)  HR: 113 (09-10-18 @ 04:19) (101 - 113)  BP: 128/70 (09-10-18 @ 04:19) (113/68 - 149/86)  RR: 18 (09-10-18 @ 04:19) (18 - 18)  SpO2: 99% (09-10-18 @ 04:19) (98% - 100%)  CAPILLARY BLOOD GLUCOSE      POCT Blood Glucose.: 211 mg/dL (10 Sep 2018 06:34)  POCT Blood Glucose.: 170 mg/dL (10 Sep 2018 00:24)  POCT Blood Glucose.: 124 mg/dL (09 Sep 2018 18:13)  POCT Blood Glucose.: 147 mg/dL (09 Sep 2018 11:50)      Is/Os    09-09 @ 07:01  -  09-10 @ 07:00  --------------------------------------------------------  IN:    Pivot: 245 mL  Total IN: 245 mL    OUT:    Indwelling Catheter - Urethral: 900 mL  Total OUT: 900 mL    Total NET: -655 mL          PHYSICAL EXAM:   General: NAD, Lying in bed comfortably  Extrem:  Right LE with incision from right hip to right lateral knee.  Wound indurated proximally without erythema.  No palpable collections.  Distal area of dehiscence appears stable - no purulence or malodor but stable area of wound opening.  Surrounding tissues are mobile and soft without evidence of active infection.    MEDICATIONS (STANDING): ALBUTerol/ipratropium for Nebulization 3 milliLiter(s) Nebulizer every 6 hours  buDESOnide   0.5 milliGRAM(s) Respule 0.5 milliGRAM(s) Inhalation every 12 hours  clonazePAM Tablet 2 milliGRAM(s) Oral <User Schedule>  dextrose 5%. 1000 milliLiter(s) IV Continuous <Continuous>  dextrose 50% Injectable 12.5 Gram(s) IV Push once  dextrose 50% Injectable 25 Gram(s) IV Push once  dextrose 50% Injectable 25 Gram(s) IV Push once  enoxaparin Injectable 40 milliGRAM(s) SubCutaneous daily  insulin lispro (HumaLOG) corrective regimen sliding scale   SubCutaneous every 6 hours  insulin NPH human recombinant 5 Unit(s) SubCutaneous at bedtime  mirtazapine 45 milliGRAM(s) Oral <User Schedule>  nystatin    Suspension 249113 Unit(s) Oral four times a day  pantoprazole  Injectable 40 milliGRAM(s) IV Push every 24 hours  predniSONE  Solution 10 milliGRAM(s) Enteral Tube every 24 hours  QUEtiapine 50 milliGRAM(s) Oral <User Schedule>  sertraline 50 milliGRAM(s) Oral daily  vancomycin  IVPB 500 milliGRAM(s) IV Intermittent every 24 hours    MEDICATIONS (PRN):acetaminophen  IVPB .. 1000 milliGRAM(s) IV Intermittent every 8 hours PRN Mild Pain (1 - 3), Moderate Pain (4 - 6), Severe Pain (7 - 10)  dextrose 40% Gel 15 Gram(s) Oral once PRN Blood Glucose LESS THAN 70 milliGRAM(s)/deciLiter  glucagon  Injectable 1 milliGRAM(s) IntraMuscular once PRN Glucose <70 milliGRAM(s)/deciLiter  oxyCODONE    IR 5 milliGRAM(s) Oral four times a day PRN Moderate Pain (4 - 6)      LABS  CBC (09-09 @ 09:23)                              8.1<L>                         12.16<H>  )----------------(  389        --    % Neutrophils, --    % Lymphocytes, ANC: --                                  26.7<L>    BMP (09-10 @ 04:30)             141     |  101     |  34<H> 		Ca++ --      Ca 8.7                ---------------------------------( 179<H>		Mg --                 4.6     |  28      |  0.57  			Ph --      BMP (09-09 @ 07:44)             139     |  101     |  33<H> 		Ca++ --      Ca 8.3<L>             ---------------------------------( 210<H>		Mg --                 4.8     |  27      |  0.58  			Ph --                    IMAGING STUDIES  No new imaging since prior consultation note written Plastic Surgery    SUBJECTIVE:   Called to reevaluate patient's wound.   Per Pt and RN, wound drainage has not increased and wound has not changed significantly.    VITALS  T(C): 36.8 (09-10-18 @ 04:19), Max: 36.9 (09-09-18 @ 13:29)  HR: 113 (09-10-18 @ 04:19) (101 - 113)  BP: 128/70 (09-10-18 @ 04:19) (113/68 - 149/86)  RR: 18 (09-10-18 @ 04:19) (18 - 18)  SpO2: 99% (09-10-18 @ 04:19) (98% - 100%)  CAPILLARY BLOOD GLUCOSE      POCT Blood Glucose.: 211 mg/dL (10 Sep 2018 06:34)  POCT Blood Glucose.: 170 mg/dL (10 Sep 2018 00:24)  POCT Blood Glucose.: 124 mg/dL (09 Sep 2018 18:13)  POCT Blood Glucose.: 147 mg/dL (09 Sep 2018 11:50)      Is/Os    09-09 @ 07:01  -  09-10 @ 07:00  --------------------------------------------------------  IN:    Pivot: 245 mL  Total IN: 245 mL    OUT:    Indwelling Catheter - Urethral: 900 mL  Total OUT: 900 mL    Total NET: -655 mL          PHYSICAL EXAM:   General: NAD, Lying in bed comfortably  Extrem:  Right LE with incision from right hip to right lateral knee.  Wound indurated proximally without erythema.  No palpable collections.  Distal area of dehiscence appears stable - no purulence or malodor but stable area of wound opening.  Surrounding tissues are mobile and soft without evidence of active infection.    MEDICATIONS (STANDING): ALBUTerol/ipratropium for Nebulization 3 milliLiter(s) Nebulizer every 6 hours  buDESOnide   0.5 milliGRAM(s) Respule 0.5 milliGRAM(s) Inhalation every 12 hours  clonazePAM Tablet 2 milliGRAM(s) Oral <User Schedule>  dextrose 5%. 1000 milliLiter(s) IV Continuous <Continuous>  dextrose 50% Injectable 12.5 Gram(s) IV Push once  dextrose 50% Injectable 25 Gram(s) IV Push once  dextrose 50% Injectable 25 Gram(s) IV Push once  enoxaparin Injectable 40 milliGRAM(s) SubCutaneous daily  insulin lispro (HumaLOG) corrective regimen sliding scale   SubCutaneous every 6 hours  insulin NPH human recombinant 5 Unit(s) SubCutaneous at bedtime  mirtazapine 45 milliGRAM(s) Oral <User Schedule>  nystatin    Suspension [id number] Unit(s) Oral four times a day  pantoprazole  Injectable 40 milliGRAM(s) IV Push every 24 hours  predniSONE  Solution 10 milliGRAM(s) Enteral Tube every 24 hours  QUEtiapine 50 milliGRAM(s) Oral <User Schedule>  sertraline 50 milliGRAM(s) Oral daily  vancomycin  IVPB 500 milliGRAM(s) IV Intermittent every 24 hours    MEDICATIONS (PRN):acetaminophen  IVPB .. 1000 milliGRAM(s) IV Intermittent every 8 hours PRN Mild Pain (1 - 3), Moderate Pain (4 - 6), Severe Pain (7 - 10)  dextrose 40% Gel 15 Gram(s) Oral once PRN Blood Glucose LESS THAN 70 milliGRAM(s)/deciLiter  glucagon  Injectable 1 milliGRAM(s) IntraMuscular once PRN Glucose <70 milliGRAM(s)/deciLiter  oxyCODONE    IR 5 milliGRAM(s) Oral four times a day PRN Moderate Pain (4 - 6)      LABS  CBC (09-09 @ 09:23)                              8.1<L>                         12.16<H>  )----------------(  389        --    % Neutrophils, --    % Lymphocytes, ANC: --                                  26.7<L>    BMP (09-10 @ 04:30)             141     |  101     |  34<H> 		Ca++ --      Ca 8.7                ---------------------------------( 179<H>		Mg --                 4.6     |  28      |  0.57  			Ph --      BMP (09-09 @ 07:44)             139     |  101     |  33<H> 		Ca++ --      Ca 8.3<L>             ---------------------------------( 210<H>		Mg --                 4.8     |  27      |  0.58  			Ph --                    IMAGING STUDIES  No new imaging since prior consultation note written

## 2018-09-10 NOTE — PROGRESS NOTE ADULT - SUBJECTIVE AND OBJECTIVE BOX
Orthopaedic Surgery Progress Note    Subjective:   Patient seen and examined  No acute events overnight  Pain controlled  Having BID wet to dry dressing changes    Objective:  T(C): 36.8 (09-10-18 @ 04:19), Max: 36.9 (09-09-18 @ 13:29)  HR: 113 (09-10-18 @ 04:19) (101 - 113)  BP: 128/70 (09-10-18 @ 04:19) (113/68 - 149/86)  RR: 18 (09-10-18 @ 04:19) (18 - 18)  SpO2: 99% (09-10-18 @ 04:19) (98% - 100%)    09-08 @ 07:01  -  09-09 @ 07:00  --------------------------------------------------------  IN: 280 mL / OUT: 700 mL / NET: -420 mL    09-09 @ 07:01  -  09-10 @ 06:37  --------------------------------------------------------  IN: 245 mL / OUT: 900 mL / NET: -655 mL    PE  NAD  RLE:   BID dressing intact  Wound underneath with distal 2-3 inch area of superficial wound opening, clean  motor intact GS/TA/EHL  SILT S/S/SP/DP  WWP distally                        8.1    12.16 )-----------( 389      ( 09 Sep 2018 09:23 )             26.7     09-10    141  |  101  |  34<H>  ----------------------------<  179<H>  4.6   |  28  |  0.57    Ca    8.7      10 Sep 2018 04:30    68y Female POD38 s/p R TREVER explant, articulating spacer and distal femur ORIF, L acetabular fx    - Pain control  - NWB RLE, FFWB LLE  - FU PRS recs regarding wound care  - WTD dressing changes BID by nursing, RLE  - PT/OT  - DVT ppx: Lovenox  - Dispo planning    Kunal Mcgraw MD

## 2018-09-10 NOTE — PROGRESS NOTE ADULT - PROBLEM SELECTOR PLAN 2
-check Vit D 25-OH and PTH  -pt needs an outpt DXA as she likely has OP -check Vit D 25-OH and PTH  -spoke with her daughter who notes that she has severe OP but denies her mother getting any injectable medications.  -pt needs an outpt DXA as she likely has OP

## 2018-09-11 LAB
24R-OH-CALCIDIOL SERPL-MCNC: 18.9 NG/ML — LOW (ref 30–80)
ANION GAP SERPL CALC-SCNC: 9 MMOL/L — SIGNIFICANT CHANGE UP (ref 5–17)
BASOPHILS # BLD AUTO: 0.05 K/UL — SIGNIFICANT CHANGE UP (ref 0–0.2)
BASOPHILS NFR BLD AUTO: 0.4 % — SIGNIFICANT CHANGE UP (ref 0–2)
BUN SERPL-MCNC: 33 MG/DL — HIGH (ref 7–23)
CALCIUM SERPL-MCNC: 8.5 MG/DL — SIGNIFICANT CHANGE UP (ref 8.4–10.5)
CALCIUM SERPL-MCNC: 8.6 MG/DL — SIGNIFICANT CHANGE UP (ref 8.4–10.5)
CHLORIDE SERPL-SCNC: 102 MMOL/L — SIGNIFICANT CHANGE UP (ref 96–108)
CO2 SERPL-SCNC: 30 MMOL/L — SIGNIFICANT CHANGE UP (ref 22–31)
CREAT SERPL-MCNC: 0.58 MG/DL — SIGNIFICANT CHANGE UP (ref 0.5–1.3)
CULTURE RESULTS: NO GROWTH — SIGNIFICANT CHANGE UP
EOSINOPHIL # BLD AUTO: 0.36 K/UL — SIGNIFICANT CHANGE UP (ref 0–0.5)
EOSINOPHIL NFR BLD AUTO: 2.9 % — SIGNIFICANT CHANGE UP (ref 0–6)
GLUCOSE BLDC GLUCOMTR-MCNC: 128 MG/DL — HIGH (ref 70–99)
GLUCOSE BLDC GLUCOMTR-MCNC: 141 MG/DL — HIGH (ref 70–99)
GLUCOSE BLDC GLUCOMTR-MCNC: 169 MG/DL — HIGH (ref 70–99)
GLUCOSE BLDC GLUCOMTR-MCNC: 171 MG/DL — HIGH (ref 70–99)
GLUCOSE BLDC GLUCOMTR-MCNC: 172 MG/DL — HIGH (ref 70–99)
GLUCOSE SERPL-MCNC: 148 MG/DL — HIGH (ref 70–99)
HCT VFR BLD CALC: 24.6 % — LOW (ref 34.5–45)
HGB BLD-MCNC: 7.3 G/DL — LOW (ref 11.5–15.5)
IMM GRANULOCYTES NFR BLD AUTO: 0.6 % — SIGNIFICANT CHANGE UP (ref 0–1.5)
LYMPHOCYTES # BLD AUTO: 0.76 K/UL — LOW (ref 1–3.3)
LYMPHOCYTES # BLD AUTO: 6.1 % — LOW (ref 13–44)
MCHC RBC-ENTMCNC: 28.2 PG — SIGNIFICANT CHANGE UP (ref 27–34)
MCHC RBC-ENTMCNC: 29.7 GM/DL — LOW (ref 32–36)
MCV RBC AUTO: 95 FL — SIGNIFICANT CHANGE UP (ref 80–100)
MONOCYTES # BLD AUTO: 1.07 K/UL — HIGH (ref 0–0.9)
MONOCYTES NFR BLD AUTO: 8.6 % — SIGNIFICANT CHANGE UP (ref 2–14)
NEUTROPHILS # BLD AUTO: 10.06 K/UL — HIGH (ref 1.8–7.4)
NEUTROPHILS NFR BLD AUTO: 81.4 % — HIGH (ref 43–77)
PLATELET # BLD AUTO: 388 K/UL — SIGNIFICANT CHANGE UP (ref 150–400)
POTASSIUM SERPL-MCNC: 4.5 MMOL/L — SIGNIFICANT CHANGE UP (ref 3.5–5.3)
POTASSIUM SERPL-SCNC: 4.5 MMOL/L — SIGNIFICANT CHANGE UP (ref 3.5–5.3)
PTH-INTACT FLD-MCNC: 52 PG/ML — SIGNIFICANT CHANGE UP (ref 15–65)
RBC # BLD: 2.59 M/UL — LOW (ref 3.8–5.2)
RBC # FLD: 17.1 % — HIGH (ref 10.3–14.5)
SODIUM SERPL-SCNC: 141 MMOL/L — SIGNIFICANT CHANGE UP (ref 135–145)
SPECIMEN SOURCE: SIGNIFICANT CHANGE UP
VANCOMYCIN TROUGH SERPL-MCNC: 10.9 UG/ML — SIGNIFICANT CHANGE UP (ref 10–20)
WBC # BLD: 12.37 K/UL — HIGH (ref 3.8–10.5)
WBC # FLD AUTO: 12.37 K/UL — HIGH (ref 3.8–10.5)

## 2018-09-11 PROCEDURE — 99232 SBSQ HOSP IP/OBS MODERATE 35: CPT | Mod: GC

## 2018-09-11 RX ORDER — ERGOCALCIFEROL 1.25 MG/1
50000 CAPSULE ORAL
Qty: 0 | Refills: 0 | Status: DISCONTINUED | OUTPATIENT
Start: 2018-09-11 | End: 2018-09-19

## 2018-09-11 RX ORDER — INSULIN LISPRO 100/ML
VIAL (ML) SUBCUTANEOUS EVERY 6 HOURS
Qty: 0 | Refills: 0 | Status: DISCONTINUED | OUTPATIENT
Start: 2018-09-11 | End: 2018-09-12

## 2018-09-11 RX ORDER — ACETAMINOPHEN 500 MG
1000 TABLET ORAL EVERY 8 HOURS
Qty: 0 | Refills: 0 | Status: COMPLETED | OUTPATIENT
Start: 2018-09-11 | End: 2018-09-11

## 2018-09-11 RX ADMIN — MORPHINE SULFATE 1 MILLIGRAM(S): 50 CAPSULE, EXTENDED RELEASE ORAL at 10:00

## 2018-09-11 RX ADMIN — SERTRALINE 50 MILLIGRAM(S): 25 TABLET, FILM COATED ORAL at 13:06

## 2018-09-11 RX ADMIN — Medication 0.5 MILLIGRAM(S): at 00:38

## 2018-09-11 RX ADMIN — CHLORHEXIDINE GLUCONATE 1 APPLICATION(S): 213 SOLUTION TOPICAL at 06:48

## 2018-09-11 RX ADMIN — CEFEPIME 100 MILLIGRAM(S): 1 INJECTION, POWDER, FOR SOLUTION INTRAMUSCULAR; INTRAVENOUS at 00:02

## 2018-09-11 RX ADMIN — Medication 0.5 MILLIGRAM(S): at 12:39

## 2018-09-11 RX ADMIN — MORPHINE SULFATE 1 MILLIGRAM(S): 50 CAPSULE, EXTENDED RELEASE ORAL at 17:58

## 2018-09-11 RX ADMIN — LIDOCAINE 1 PATCH: 4 CREAM TOPICAL at 11:30

## 2018-09-11 RX ADMIN — PANTOPRAZOLE SODIUM 40 MILLIGRAM(S): 20 TABLET, DELAYED RELEASE ORAL at 13:07

## 2018-09-11 RX ADMIN — Medication 1000 MILLIGRAM(S): at 11:00

## 2018-09-11 RX ADMIN — QUETIAPINE FUMARATE 50 MILLIGRAM(S): 200 TABLET, FILM COATED ORAL at 22:22

## 2018-09-11 RX ADMIN — HUMAN INSULIN 6 UNIT(S): 100 INJECTION, SUSPENSION SUBCUTANEOUS at 00:34

## 2018-09-11 RX ADMIN — Medication 3 MILLILITER(S): at 06:47

## 2018-09-11 RX ADMIN — OXYCODONE HYDROCHLORIDE 10 MILLIGRAM(S): 5 TABLET ORAL at 14:55

## 2018-09-11 RX ADMIN — Medication 1: at 18:10

## 2018-09-11 RX ADMIN — ENOXAPARIN SODIUM 40 MILLIGRAM(S): 100 INJECTION SUBCUTANEOUS at 12:38

## 2018-09-11 RX ADMIN — Medication 100 MILLIGRAM(S): at 11:23

## 2018-09-11 RX ADMIN — Medication 3 MILLILITER(S): at 00:38

## 2018-09-11 RX ADMIN — LIDOCAINE 1 PATCH: 4 CREAM TOPICAL at 22:33

## 2018-09-11 RX ADMIN — MIRTAZAPINE 45 MILLIGRAM(S): 45 TABLET, ORALLY DISINTEGRATING ORAL at 22:22

## 2018-09-11 RX ADMIN — MORPHINE SULFATE 1 MILLIGRAM(S): 50 CAPSULE, EXTENDED RELEASE ORAL at 18:20

## 2018-09-11 RX ADMIN — Medication 1: at 00:35

## 2018-09-11 RX ADMIN — Medication 2 MILLIGRAM(S): at 22:22

## 2018-09-11 RX ADMIN — Medication 400 MILLIGRAM(S): at 10:17

## 2018-09-11 RX ADMIN — Medication 1: at 06:48

## 2018-09-11 RX ADMIN — OXYCODONE HYDROCHLORIDE 10 MILLIGRAM(S): 5 TABLET ORAL at 15:45

## 2018-09-11 RX ADMIN — MORPHINE SULFATE 1 MILLIGRAM(S): 50 CAPSULE, EXTENDED RELEASE ORAL at 09:31

## 2018-09-11 RX ADMIN — Medication 3 MILLILITER(S): at 12:39

## 2018-09-11 RX ADMIN — Medication 5 MILLIGRAM(S): at 06:48

## 2018-09-11 NOTE — PROGRESS NOTE ADULT - ATTENDING COMMENTS
Pt seen and examined. Agree with note as above with addendum: pt with Vit D 25OH of 18 so unable to treat for OP at this time. Also she needs to complete dental work. Her RF for further fx are OP, low vit D and chronic use of steroids. Discussed with PCP that instead of decreasing to prednisone 5mg po qdaily to will do 7.5mg po qdaily as she was getting 10mg for years per .  Marilyn Schultz MD  [phone]

## 2018-09-11 NOTE — SWALLOW BEDSIDE ASSESSMENT ADULT - SPECIFY REASON(S)
to assess the swallow mechanism; r/o dysphagia.
to subjectively assess the swallow mechanism r/o dysphagia
to subjectively reassess the swallow mechanism r/o dysphagia and determine safety of initiating an oral diet.
to assess the swallow mechanism; r/o dysphagia.

## 2018-09-11 NOTE — PROGRESS NOTE ADULT - SUBJECTIVE AND OBJECTIVE BOX
Chief Complaint: Evaluating this 67 y/o F for steroid induced hyperglycemia      Interval History: Pt is AAO x1 , poor historian,  at bedside denies any new complaints  Remains on 24 hour tube feeds.     MEDICATIONS  (STANDING):  ALBUTerol/ipratropium for Nebulization 3 milliLiter(s) Nebulizer every 6 hours  buDESOnide   0.5 milliGRAM(s) Respule 0.5 milliGRAM(s) Inhalation every 12 hours  chlorhexidine 4% Liquid 1 Application(s) Topical <User Schedule>  clonazePAM Tablet 2 milliGRAM(s) Oral <User Schedule>  dextrose 5%. 1000 milliLiter(s) (50 mL/Hr) IV Continuous <Continuous>  dextrose 50% Injectable 12.5 Gram(s) IV Push once  dextrose 50% Injectable 25 Gram(s) IV Push once  dextrose 50% Injectable 25 Gram(s) IV Push once  enoxaparin Injectable 40 milliGRAM(s) SubCutaneous daily  insulin lispro (HumaLOG) corrective regimen sliding scale   SubCutaneous every 6 hours  insulin NPH human recombinant 6 Unit(s) SubCutaneous <User Schedule>  lidocaine   Patch 1 Patch Transdermal every 24 hours  mirtazapine 45 milliGRAM(s) Oral <User Schedule>  pantoprazole  Injectable 40 milliGRAM(s) IV Push every 24 hours  predniSONE   Tablet 5 milliGRAM(s) Oral daily  QUEtiapine 50 milliGRAM(s) Oral at bedtime  sertraline 50 milliGRAM(s) Oral daily  vancomycin  IVPB 500 milliGRAM(s) IV Intermittent every 24 hours    MEDICATIONS  (PRN):  acetaminophen  IVPB .. 1000 milliGRAM(s) IV Intermittent every 8 hours PRN Mild Pain (1 - 3), Moderate Pain (4 - 6), Severe Pain (7 - 10)  dextrose 40% Gel 15 Gram(s) Oral once PRN Blood Glucose LESS THAN 70 milliGRAM(s)/deciLiter  glucagon  Injectable 1 milliGRAM(s) IntraMuscular once PRN Glucose <70 milliGRAM(s)/deciLiter  morphine  - Injectable 1 milliGRAM(s) IV Push every 6 hours PRN breakthrough pain  nystatin Powder 1 Application(s) Topical two times a day PRN rash/itchiness  oxyCODONE    IR 10 milliGRAM(s) Oral every 8 hours PRN Severe Pain (7 - 10)  QUEtiapine 25 milliGRAM(s) Oral at bedtime PRN agitation    Review of Systems:  Constitutional: No fever  Eyes: No blurry vision  Cardiovascular: No chest pain  Respiratory: No SOB  GI: No abdominal pain, No nausea, No vomiting  Endocrine: as noted in HPI      PHYSICAL EXAM:  VITALS: T(C): 36.6 (09-11-18 @ 00:37)  T(F): 97.8 (09-11-18 @ 00:37), Max: 99.8 (09-10-18 @ 22:37)  HR: 112 (09-11-18 @ 00:37) (112 - 113)  BP: 102/60 (09-11-18 @ 00:37) (102/60 - 155/91)  RR:  (16 - 100)  SpO2:  (98% - 99%)  Wt(kg): --  GENERAL: NAD at this time, cachectic  HEENT:  Atraumatic, Normocephalic,   RESPIRATORY: Clear to auscultation bilaterally, full excursion, non labored  CARDIOVASCULAR: Regular rhythm; normal S1/S2, no peripheral edema  GI: Soft, nontender, non distended, normal bowel sounds; + PEG tube        POCT Blood Glucose.: 128 mg/dL (09-11-18 @ 12:59)  POCT Blood Glucose.: 169 mg/dL (09-11-18 @ 06:36)  POCT Blood Glucose.: 172 mg/dL (09-11-18 @ 00:27)    POCT Blood Glucose.: 122 mg/dL (09-10-18 @ 18:21)  POCT Blood Glucose.: 127 mg/dL (09-10-18 @ 13:06)  POCT Blood Glucose.: 151 mg/dL (09-10-18 @ 11:47)  POCT Blood Glucose.: 211 mg/dL (09-10-18 @ 06:34)  POCT Blood Glucose.: 170 mg/dL (09-10-18 @ 00:24)    POCT Blood Glucose.: 124 mg/dL (09-09-18 @ 18:13)  POCT Blood Glucose.: 147 mg/dL (09-09-18 @ 11:50)  POCT Blood Glucose.: 234 mg/dL (09-09-18 @ 06:46)  POCT Blood Glucose.: 138 mg/dL (09-09-18 @ 00:42)  POCT Blood Glucose.: 114 mg/dL (09-08-18 @ 17:32)    09-11    141  |  102  |  33<H>  ----------------------------<  148<H>  4.5   |  30  |  0.58    EGFR if : 110  EGFR if non : 95    Ca    8.5      09-11            Thyroid Function Tests:      Hemoglobin A1C, Whole Blood: 5.5 % [4.0 - 5.6] (07-27-18 @ 09:02) Chief Complaint: Evaluating this 69 y/o F for steroid induced hyperglycemia      Interval History: Pt is AAO x1 , poor historian,  at bedside denies any new complaints  Remains on 24 hour tube feeds.     MEDICATIONS  (STANDING):  ALBUTerol/ipratropium for Nebulization 3 milliLiter(s) Nebulizer every 6 hours  buDESOnide   0.5 milliGRAM(s) Respule 0.5 milliGRAM(s) Inhalation every 12 hours  chlorhexidine 4% Liquid 1 Application(s) Topical <User Schedule>  clonazePAM Tablet 2 milliGRAM(s) Oral <User Schedule>  dextrose 5%. 1000 milliLiter(s) (50 mL/Hr) IV Continuous <Continuous>  dextrose 50% Injectable 12.5 Gram(s) IV Push once  dextrose 50% Injectable 25 Gram(s) IV Push once  dextrose 50% Injectable 25 Gram(s) IV Push once  enoxaparin Injectable 40 milliGRAM(s) SubCutaneous daily  insulin lispro (HumaLOG) corrective regimen sliding scale   SubCutaneous every 6 hours  insulin NPH human recombinant 6 Unit(s) SubCutaneous <User Schedule>  lidocaine   Patch 1 Patch Transdermal every 24 hours  mirtazapine 45 milliGRAM(s) Oral <User Schedule>  pantoprazole  Injectable 40 milliGRAM(s) IV Push every 24 hours  predniSONE   Tablet 5 milliGRAM(s) Oral daily  QUEtiapine 50 milliGRAM(s) Oral at bedtime  sertraline 50 milliGRAM(s) Oral daily  vancomycin  IVPB 500 milliGRAM(s) IV Intermittent every 24 hours    MEDICATIONS  (PRN):  acetaminophen  IVPB .. 1000 milliGRAM(s) IV Intermittent every 8 hours PRN Mild Pain (1 - 3), Moderate Pain (4 - 6), Severe Pain (7 - 10)  dextrose 40% Gel 15 Gram(s) Oral once PRN Blood Glucose LESS THAN 70 milliGRAM(s)/deciLiter  glucagon  Injectable 1 milliGRAM(s) IntraMuscular once PRN Glucose <70 milliGRAM(s)/deciLiter  morphine  - Injectable 1 milliGRAM(s) IV Push every 6 hours PRN breakthrough pain  nystatin Powder 1 Application(s) Topical two times a day PRN rash/itchiness  oxyCODONE    IR 10 milliGRAM(s) Oral every 8 hours PRN Severe Pain (7 - 10)  QUEtiapine 25 milliGRAM(s) Oral at bedtime PRN agitation    Review of Systems:  Constitutional: No fever  Eyes: No blurry vision  Cardiovascular: No chest pain  Respiratory: No SOB  GI: No abdominal pain, No nausea, No vomiting  Endocrine: as noted in HPI      PHYSICAL EXAM:  VITALS: T(C): 36.6 (09-11-18 @ 00:37)  T(F): 97.8 (09-11-18 @ 00:37), Max: 99.8 (09-10-18 @ 22:37)  HR: 112 (09-11-18 @ 00:37) (112 - 113)  BP: 102/60 (09-11-18 @ 00:37) (102/60 - 155/91)  RR:  (16 - 100)  SpO2:  (98% - 99%)  Wt(kg): --  GENERAL: NAD at this time, cachectic  HEENT:  Atraumatic, Normocephalic,   RESPIRATORY: Clear to auscultation bilaterally, full excursion, non labored  CARDIOVASCULAR: Regular rhythm; normal S1/S2, no peripheral edema  GI: Soft, nontender, non distended, normal bowel sounds; + PEG tube        POCT Blood Glucose.: 128 mg/dL (09-11-18 @ 12:59)  POCT Blood Glucose.: 169 mg/dL (09-11-18 @ 06:36)  POCT Blood Glucose.: 172 mg/dL (09-11-18 @ 00:27)    POCT Blood Glucose.: 122 mg/dL (09-10-18 @ 18:21)  POCT Blood Glucose.: 127 mg/dL (09-10-18 @ 13:06)  POCT Blood Glucose.: 151 mg/dL (09-10-18 @ 11:47)  POCT Blood Glucose.: 211 mg/dL (09-10-18 @ 06:34)  POCT Blood Glucose.: 170 mg/dL (09-10-18 @ 00:24)    POCT Blood Glucose.: 124 mg/dL (09-09-18 @ 18:13)  POCT Blood Glucose.: 147 mg/dL (09-09-18 @ 11:50)  POCT Blood Glucose.: 234 mg/dL (09-09-18 @ 06:46)  POCT Blood Glucose.: 138 mg/dL (09-09-18 @ 00:42)  POCT Blood Glucose.: 114 mg/dL (09-08-18 @ 17:32)    09-11    141  |  102  |  33<H>  ----------------------------<  148<H>  4.5   |  30  |  0.58    EGFR if : 110  EGFR if non : 95    Ca    8.5      09-11          Hemoglobin A1C, Whole Blood: 5.5 % [4.0 - 5.6] (07-27-18 @ 09:02)

## 2018-09-11 NOTE — SWALLOW BEDSIDE ASSESSMENT ADULT - ASR SWALLOW RECOMMEND DIAG
VFSS/MBS/Per d/w NP Marlene; MBS to be scheduled for Monday Sept 17
defer objective swallow study at this time, patient is not a candidate
FEES

## 2018-09-11 NOTE — PROGRESS NOTE ADULT - SUBJECTIVE AND OBJECTIVE BOX
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    Patient reports: she is comfortable in bed, tolerating peg feeds.No dyspnea.Left arm PICC line is intact    REVIEW OF SYSTEMS:  All other review of systems negative (Comprehensive ROS)    Antimicrobials Day #  :day   vancomycin  IVPB 500 milliGRAM(s) IV Intermittent every 24 hours    Other Medications Reviewed    T(F): 97.8 (18 @ 00:37), Max: 99.8 (09-10-18 @ 22:37)  HR: 112 (18 @ 00:37)  BP: 102/60 (18 @ 00:37)  RR: 16 (18 @ 00:37)  SpO2: 98% (18 @ 00:37)  Wt(kg): --    PHYSICAL EXAM:  General: alert, no acute distress  Eyes:  anicteric, no conjunctival injection, no discharge  Oropharynx: no lesions or injection 	  Neck: supple, without adenopathy  Lungs: clear to auscultation  Heart: regular rate and rhythm; no murmur, rubs or gallops  Abdomen: soft, nondistended, nontender, without mass or organomegaly, peg in place  Skin: no lesions  Extremities: no clubbing, cyanosis, or edema  Neurologic: alert, oriented, moves all extremities  Rt hip incision with serous drainage, a few areas of wound dehiscence, no erythema or purulence  LAB RESULTS:                        7.7    13.83 )-----------( 417      ( 10 Sep 2018 09:29 )             25.9     09-11    141  |  102  |  33<H>  ----------------------------<  148<H>  4.5   |  30  |  0.58    Ca    8.5      11 Sep 2018 07:01        Urinalysis Basic - ( 10 Sep 2018 21:25 )    Color: Light Yellow / Appearance: Clear / S.022 / pH: x  Gluc: x / Ketone: Negative  / Bili: Negative / Urobili: Negative   Blood: x / Protein: 30 mg/dL / Nitrite: Negative   Leuk Esterase: Moderate / RBC: 21 /hpf / WBC 41 /hpf   Sq Epi: x / Non Sq Epi: 5 /hpf / Bacteria: Moderate      MICROBIOLOGY:  RECENT CULTURES:  Blood cultures are pending    RADIOLOGY REVIEWED:  < from: CT Lower Extremity No Cont, Right (09.10.18 @ 14:30) >  INTERPRETATION:  CT of the right femur     CLINICAL INFORMATION: Status post right total hip replacement wound   dehiscence with spacer. Would not healing. Evaluate for periprosthetic   collection.  TECHNIQUE: Axial CT images were obtained of the right femur with coronal   and sagittal reconstructions. No contrast was administered.    COMPARISON: Right femoral radiographs dated 2018 and CT of the   abdomen and pelvis dated 2018.    FINDINGS:    Right femoral spacer with cerclage wire and laterally oriented sideplate   and screws as well as total right knee arthroplasty are visualized with   associated streak artifact, which limits evaluation. Two interfragmentary   screws are visualized through the distal femoral diaphysis. There is   absence of the anterior femoral cortex of the mid femoral shaft. No   discrete fluid collection is visualized. There is diffuse edema within   the myofascial planes and subcutaneous tissues along the right gluteal   region and along the entire length of the right thigh. There is no soft   tissue gas.    IMPRESSION:    Limited exam secondary to streak artifact. No discrete collection   visualized. Diffuse subcutaneous and myofascial edema along the length of   the right thigh.    < end of copied text >  < from: Xray Chest 1 View- PORTABLE-Routine (09.10.18 @ 20:42) >  Impression:    Multifocal patchy consolidations which may be due to infection in the   right clinical setting. Follow-up recommended to ensure resolution

## 2018-09-11 NOTE — SWALLOW BEDSIDE ASSESSMENT ADULT - SWALLOW EVAL: DIAGNOSIS
Pt presents with oropharyngeal dysphagia therefore objective swallowing assessment is warranted.  Pt awake, alert and cooperative in bed with improving phonation noted.  Direct feeding not attempted given aspiration risks. Pt presents with oropharyngeal dysphagia therefore objective swallowing assessment is warranted to r/o aspiration and pharyngeal stage deficits with various textures.  Pt awake, alert and cooperative in bed with improving phonation noted.  Direct feeding not attempted given aspiration risks.

## 2018-09-11 NOTE — SWALLOW BEDSIDE ASSESSMENT ADULT - SWALLOW EVAL: CURRENT DIET
NPO, with non-oral nutrition/hydration/medications.
NPO with kft for feeding
PEG
npo with kft for feeding

## 2018-09-11 NOTE — PROGRESS NOTE ADULT - SUBJECTIVE AND OBJECTIVE BOX
INTERVAL HPI/OVERNIGHT EVENTS: comfortable overnight, tolerating peg feeds at goal    MEDICATIONS  (STANDING):  ALBUTerol/ipratropium for Nebulization 3 milliLiter(s) Nebulizer every 6 hours  buDESOnide   0.5 milliGRAM(s) Respule 0.5 milliGRAM(s) Inhalation every 12 hours  chlorhexidine 4% Liquid 1 Application(s) Topical <User Schedule>  clonazePAM Tablet 2 milliGRAM(s) Oral <User Schedule>  dextrose 5%. 1000 milliLiter(s) (50 mL/Hr) IV Continuous <Continuous>  dextrose 50% Injectable 12.5 Gram(s) IV Push once  dextrose 50% Injectable 25 Gram(s) IV Push once  dextrose 50% Injectable 25 Gram(s) IV Push once  enoxaparin Injectable 40 milliGRAM(s) SubCutaneous daily  insulin lispro (HumaLOG) corrective regimen sliding scale   SubCutaneous every 6 hours  insulin NPH human recombinant 6 Unit(s) SubCutaneous <User Schedule>  lidocaine   Patch 1 Patch Transdermal every 24 hours  mirtazapine 45 milliGRAM(s) Oral <User Schedule>  pantoprazole  Injectable 40 milliGRAM(s) IV Push every 24 hours  predniSONE   Tablet 5 milliGRAM(s) Oral daily  QUEtiapine 50 milliGRAM(s) Oral at bedtime  sertraline 50 milliGRAM(s) Oral daily  vancomycin  IVPB 500 milliGRAM(s) IV Intermittent every 24 hours    MEDICATIONS  (PRN):  acetaminophen  IVPB .. 1000 milliGRAM(s) IV Intermittent every 8 hours PRN Mild Pain (1 - 3), Moderate Pain (4 - 6), Severe Pain (7 - 10)  acetaminophen  IVPB .. 1000 milliGRAM(s) IV Intermittent every 8 hours PRN Mild Pain (1 - 3), Moderate Pain (4 - 6), Severe Pain (7 - 10)  dextrose 40% Gel 15 Gram(s) Oral once PRN Blood Glucose LESS THAN 70 milliGRAM(s)/deciLiter  glucagon  Injectable 1 milliGRAM(s) IntraMuscular once PRN Glucose <70 milliGRAM(s)/deciLiter  morphine  - Injectable 1 milliGRAM(s) IV Push every 6 hours PRN breakthrough pain  nystatin Powder 1 Application(s) Topical two times a day PRN rash/itchiness  oxyCODONE    IR 10 milliGRAM(s) Oral every 8 hours PRN Severe Pain (7 - 10)  QUEtiapine 25 milliGRAM(s) Oral at bedtime PRN agitation      Allergies    ASA; dye contrast (Anaphylaxis)  aspirin (Short breath)  divalproex sodium (Other (Unknown))  Haldol (Other (Unknown))  penicillin (Short breath; Rash)  sulfa drugs (Short breath; Rash)  Xanax (Other (Unknown))    Intolerances            PHYSICAL EXAM:   Vital Signs:  Vital Signs Last 24 Hrs  T(C): 36.6 (11 Sep 2018 00:37), Max: 37.7 (10 Sep 2018 22:37)  T(F): 97.8 (11 Sep 2018 00:37), Max: 99.8 (10 Sep 2018 22:37)  HR: 112 (11 Sep 2018 00:37) (105 - 114)  BP: 102/60 (11 Sep 2018 00:37) (102/60 - 155/91)  BP(mean): --  RR: 16 (11 Sep 2018 00:37) (16 - 100)  SpO2: 98% (11 Sep 2018 00:37) (97% - 99%)  Daily     Daily     GENERAL:  no distress  HEENT:  NC/AT,  anicteric  CHEST:   no increased effort, breath sounds clear  HEART:  Regular rhythm  ABDOMEN:  Soft, non-tender, non-distended, normoactive bowel sounds,  no masses ,no hepato-splenomegaly, no signs of chronic liver disease  EXTEREMITIES:  no cyanosis      LABS:                        7.7    13.83 )-----------( 417      ( 10 Sep 2018 09:29 )             25.9     09-11    141  |  102  |  33<H>  ----------------------------<  148<H>  4.5   |  30  |  0.58    Ca    8.5      11 Sep 2018 07:01        Urinalysis Basic - ( 10 Sep 2018 21:25 )    Color: Light Yellow / Appearance: Clear / S.022 / pH: x  Gluc: x / Ketone: Negative  / Bili: Negative / Urobili: Negative   Blood: x / Protein: 30 mg/dL / Nitrite: Negative   Leuk Esterase: Moderate / RBC: 21 /hpf / WBC 41 /hpf   Sq Epi: x / Non Sq Epi: 5 /hpf / Bacteria: Moderate        RADIOLOGY & ADDITIONAL TESTS:

## 2018-09-11 NOTE — PROGRESS NOTE ADULT - ASSESSMENT
Plan  - wound sharply debrided at bedside and vac placed  - PT consult for VAC mgmt - M/W/F  - can f/u with Dr. Arambula as OP  - please call with any questions

## 2018-09-11 NOTE — SWALLOW BEDSIDE ASSESSMENT ADULT - SWALLOW EVAL: RECOMMENDED DIET
NPO, with non-oral nutrition/hydration/medications.
NPO, with non-oral nutrition/hydration/medications. This service will reassess on 8/10/18.
Continue NPO, with non-oral nutrition/hydration/medications.

## 2018-09-11 NOTE — SWALLOW BEDSIDE ASSESSMENT ADULT - SWALLOW EVAL: ORAL MUSCULATURE
generally intact
generally intact
+ facial symmetry/unable to assess due to poor participation/comprehension

## 2018-09-11 NOTE — SWALLOW BEDSIDE ASSESSMENT ADULT - CONSISTENCIES ADMINISTERED
Direct feeding not attempted given Pt's h/o dysphagia as Pt requires objective testing to r/o pharyngeal stage dysphagia.
x2.5 teaspoons total/honey thick
ice chip

## 2018-09-11 NOTE — CHART NOTE - NSCHARTNOTEFT_GEN_A_CORE
Patient seen for malnutrition follow-up on 7TOW.     Chart reviewed, events noted. 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 Is s/p PEG 8/24. Course c/b readmission to SICU 8/27 with septic shock requiring pressors. Pt now transferred to floor. Pt noted with steroid induced hyperglycemia, prednisone decreased. Endocrine following. Pt seen by SLP today, recommended to remain NPO. Plan for MBS next week per RN.     Source: Patient [ x]    Family [ ]     other [x ] Medical record, RN     Diet : NPO with enteral feeds     Patient reports [ ] nausea  [ ] vomiting [ ] diarrhea [ ] constipation  [ ]chewing problems [ ] swallowing issues  [ ] other: Pt denies any acute GI distress. Last BM today (9/11), loose per PCA.      Enteral /Parenteral Nutrition: Pt observed with Pivot 1.5 running at goal rate of 35ml/hr x 24 hrs + Iron 2x per day. Per RN pt tolerating feeds well with no acute GI distress.     24 hour EN infusion: 700ml --pt received 83% of TF goal volume.     No new weight to assess. (9/5) 110 pounds     Pertinent Medications: MEDICATIONS  (STANDING):  ALBUTerol/ipratropium for Nebulization 3 milliLiter(s) Nebulizer every 6 hours  buDESOnide   0.5 milliGRAM(s) Respule 0.5 milliGRAM(s) Inhalation every 12 hours  chlorhexidine 4% Liquid 1 Application(s) Topical <User Schedule>  clonazePAM Tablet 2 milliGRAM(s) Oral <User Schedule>  dextrose 5%. 1000 milliLiter(s) (50 mL/Hr) IV Continuous <Continuous>  dextrose 50% Injectable 12.5 Gram(s) IV Push once  dextrose 50% Injectable 25 Gram(s) IV Push once  dextrose 50% Injectable 25 Gram(s) IV Push once  enoxaparin Injectable 40 milliGRAM(s) SubCutaneous daily  ergocalciferol 67725 Unit(s) Oral every week  insulin lispro (HumaLOG) corrective regimen sliding scale   SubCutaneous every 6 hours  insulin NPH human recombinant 6 Unit(s) SubCutaneous <User Schedule>  lidocaine   Patch 1 Patch Transdermal every 24 hours  mirtazapine 45 milliGRAM(s) Oral <User Schedule>  pantoprazole  Injectable 40 milliGRAM(s) IV Push every 24 hours  predniSONE   Tablet 5 milliGRAM(s) Oral daily  QUEtiapine 50 milliGRAM(s) Oral at bedtime  sertraline 50 milliGRAM(s) Oral daily  vancomycin  IVPB 500 milliGRAM(s) IV Intermittent every 24 hours    MEDICATIONS  (PRN):  acetaminophen  IVPB .. 1000 milliGRAM(s) IV Intermittent every 8 hours PRN Mild Pain (1 - 3), Moderate Pain (4 - 6), Severe Pain (7 - 10)  dextrose 40% Gel 15 Gram(s) Oral once PRN Blood Glucose LESS THAN 70 milliGRAM(s)/deciLiter  glucagon  Injectable 1 milliGRAM(s) IntraMuscular once PRN Glucose <70 milliGRAM(s)/deciLiter  morphine  - Injectable 1 milliGRAM(s) IV Push every 6 hours PRN breakthrough pain  nystatin Powder 1 Application(s) Topical two times a day PRN rash/itchiness  oxyCODONE    IR 10 milliGRAM(s) Oral every 8 hours PRN Severe Pain (7 - 10)  QUEtiapine 25 milliGRAM(s) Oral at bedtime PRN agitation    Pertinent Labs:  09-11 Na141 mmol/L Glu 148 mg/dL<H> K+ 4.5 mmol/L Cr  0.58 mg/dL BUN 33 mg/dL<H> 09-07 Phos 3.1 mg/dL  POCT glucose: 9/11: 128-172mg/dL, 9/10: 122-211mg/dL, 9/9: 124-234mg/dL     Skin: L heel DTI. R heel DTI, sacrum   Edema: none    Estimated Needs:   [x ] no change since previous assessment  [ ] recalculated:       Previous Nutrition Diagnosis:      [x ] Increased Nutrient Needs[x ] Malnutrition (severe)      Nutrition Diagnosis is [ x] ongoing  [ ] resolved [ ] not applicable : being addressed with enteral feeds        New Nutrition Diagnosis: [x ] not applicable      Interventions:   Recommend  1. Continue Pivot 1.5 @ 35 ml/hr x 24 hrs  via PEG as tolerated. Regimen to provide total volume 840ml, 1260 kcal (25 kcal/Kg), 79 grams protein (1.6 grams/Kg) and 638 ml free water. Based on dosing wt 50.4 Kg.   2. Continue Iron x 2 packets daily via PEG to promote wound healing   3.  Monitor TF tolerance and need for additional free water.      Monitoring and Evaluation:     [x ] EN infusion [x ] Tolerance to EN prescription [ x] weights [ x] follow up per protocol    [x ] other: RD to remain available and follow-up as medically appropriate. Shasha Hare RD, CDN, Pager # [phone]

## 2018-09-11 NOTE — SWALLOW BEDSIDE ASSESSMENT ADULT - COMMENTS
Per SICU note 8/19:   In the ED, patient was hemodynamically stable but noted to be hypoglycemic. She was also started on meropenem & vancomycin for concern of sepsis. CT scan revealed a 17 cm collection adjacent to the right hip prosthesis, an acute L2 compression fracture, and new left acetabular fracture. Patient was admitted to medicine but on 7/27/2018, patient was noted to be more altered, rigorous, tachypneic, febrile to 101 F, and hypotensive w/ SBP in the 60s so an RRT was called. Pt given fluid and started on a norepinephrine gtt. Patient subsequently admitted to SICU for hemodynamic monitoring. Blood cultures positive for MRSA-abx coverage given. Decision was made to take the patient to the OR on 7/27/2018 for right hip I&D and exchange of the femoral head.  She returned to the SICU intubated on vasopressor support. She was extubated on 7/30/2018 but was reintubated on 8/2/2018. Patient was taken back to the OR on 8/4/2018 for further right hip I&D, explantation of right hemiarthroplasty, placement of antibiotic spacer, and right femur ORIF. Patient weaned off vasopressor support on 8/5/2018 extubated on 8/6/2018, but reintubated for respiratory distress on 8/10 and extubated on 8/15. Per team pt will likely need PEG.     CXRAY 8/19:   IMPRESSION:   NG tube appropriately situated in the stomach.  Unchanged bilateral pleural-parenchymal pattern.
Continued history:   Per SICU note 8/19:   In the ED, patient was hemodynamically stable but noted to be hypoglycemic. She was also started on meropenem & vancomycin for concern of sepsis. CT scan revealed a 17 cm collection adjacent to the right hip prosthesis, an acute L2 compression fracture, and new left acetabular fracture. Patient was admitted to medicine but on 7/27/2018, patient was noted to be more altered, rigorous, tachypneic, febrile to 101 F, and hypotensive w/ SBP in the 60s so an RRT was called. She was more fluid and started on a norepinephrine gtt. Patient subsequently admitted to SICU for hemodynamic monitoring. Blood cultures positive for MRSA. Decision was made to take the patient to the OR on 7/27/2018 for right hip I&D and exchange of the femoral head.  She returned to the SICU intubated on vasopressor support. She was extubated on 7/30/2018 but was reintubated on 8/2/2018. Additionally, patient was persistently on vasopressor support and bacteremic with MRSA. Patient was subsequently taken back to the OR on 8/4/2018 for further right hip I&D, explantation of right hemiarthroplasty, placement of antibiotic spacer, and right femur ORIF. Patient was subsequently weaned off vasopressor support on 8/5/2018 and successfully extubated on 8/6/2018.    CXRAY 8/9:   IMPRESSION:   The enteric tube is in place with its tip in the stomach.  Minimal improvement of the hazy opacities in the right mid to upper lung
Continued history:   Per SICU note 8/19:   In the ED, patient was hemodynamically stable but noted to be hypoglycemic. She was also started on meropenem & vancomycin for concern of sepsis. CT scan revealed a 17 cm collection adjacent to the right hip prosthesis, an acute L2 compression fracture, and new left acetabular fracture. Patient was admitted to medicine but on 7/27/2018, patient was noted to be more altered, rigorous, tachypneic, febrile to 101 F, and hypotensive w/ SBP in the 60s so an RRT was called. She was more fluid and started on a norepinephrine gtt. Patient subsequently admitted to SICU for hemodynamic monitoring. Blood cultures positive for MRSA. Decision was made to take the patient to the OR on 7/27/2018 for right hip I&D and exchange of the femoral head.  She returned to the SICU intubated on vasopressor support. She was extubated on 7/30/2018 but was reintubated on 8/2/2018. Additionally, patient was persistently on vasopressor support and bacteremic with MRSA. Patient was subsequently taken back to the OR on 8/4/2018 for further right hip I&D, explantation of right hemiarthroplasty, placement of antibiotic spacer, and right femur ORIF. Patient was subsequently weaned off vasopressor support on 8/5/2018 and successfully extubated on 8/6/2018.    CXRAY 8/9:   IMPRESSION:   The enteric tube is in place with its tip in the stomach.  Minimal improvement of the hazy opacities in the right mid to upper lung
9/10/18 Per MD f/u:   weakness  pelvic fracture   agitation   early onset dementia   chronic pain   drug induced hyperglycemia   will add 25 mg seroquel at [facility]   decrease prednisone to 5 gm   wbc increasing get urine and blood cultures and cxr

## 2018-09-11 NOTE — PROGRESS NOTE ADULT - SUBJECTIVE AND OBJECTIVE BOX
---___---___---___---___---___---___ ---___---___---___---___---___---___---___---___---___---                    <<<  M E D I C A L   A T T E N D I N G    F O L L O W    U P   N O T E  >>>  has been doing well . unchanged since yesterday      ---___---___---___---___---___---      <<<  MEDICATIONS:  >>>    MEDICATIONS  (STANDING):  ALBUTerol/ipratropium for Nebulization 3 milliLiter(s) Nebulizer every 6 hours  buDESOnide   0.5 milliGRAM(s) Respule 0.5 milliGRAM(s) Inhalation every 12 hours  chlorhexidine 4% Liquid 1 Application(s) Topical <User Schedule>  clonazePAM Tablet 2 milliGRAM(s) Oral <User Schedule>  dextrose 5%. 1000 milliLiter(s) (50 mL/Hr) IV Continuous <Continuous>  dextrose 50% Injectable 12.5 Gram(s) IV Push once  dextrose 50% Injectable 25 Gram(s) IV Push once  dextrose 50% Injectable 25 Gram(s) IV Push once  enoxaparin Injectable 40 milliGRAM(s) SubCutaneous daily  insulin lispro (HumaLOG) corrective regimen sliding scale   SubCutaneous every 6 hours  insulin NPH human recombinant 6 Unit(s) SubCutaneous <User Schedule>  lidocaine   Patch 1 Patch Transdermal every 24 hours  mirtazapine 45 milliGRAM(s) Oral <User Schedule>  pantoprazole  Injectable 40 milliGRAM(s) IV Push every 24 hours  predniSONE   Tablet 5 milliGRAM(s) Oral daily  QUEtiapine 50 milliGRAM(s) Oral at bedtime  sertraline 50 milliGRAM(s) Oral daily  vancomycin  IVPB 500 milliGRAM(s) IV Intermittent every 24 hours      MEDICATIONS  (PRN):  acetaminophen  IVPB .. 1000 milliGRAM(s) IV Intermittent every 8 hours PRN Mild Pain (1 - 3), Moderate Pain (4 - 6), Severe Pain (7 - 10)  dextrose 40% Gel 15 Gram(s) Oral once PRN Blood Glucose LESS THAN 70 milliGRAM(s)/deciLiter  glucagon  Injectable 1 milliGRAM(s) IntraMuscular once PRN Glucose <70 milliGRAM(s)/deciLiter  morphine  - Injectable 1 milliGRAM(s) IV Push every 6 hours PRN breakthrough pain  nystatin Powder 1 Application(s) Topical two times a day PRN rash/itchiness  oxyCODONE    IR 10 milliGRAM(s) Oral every 8 hours PRN Severe Pain (7 - 10)  QUEtiapine 25 milliGRAM(s) Oral at bedtime PRN agitation       ---___---___---___---___---___---     <<<REVIEW OF SYSTEM: >>>    GEN: no fever, no chills, no pain  RESP: no SOB, no cough, no sputum  CVS: no chest pain, no palpitations, no edema  GI: no abdominal pain, no nausea, no vomiting, no constipation, no diarrhea  : no dysurea, no frequency  NEURO: no headache, no dizziness  PSYCH: no depression, not anxious  Derm : no itching, no rash     ---___---___---___---___---___---          <<<  VITAL SIGNS: >>>    T(F): 97.8 (18 @ 00:37), Max: 99.8 (09-10-18 @ 22:37)  HR: 112 (18 @ 00:37) (112 - 113)  BP: 102/60 (18 @ 00:37) (102/60 - 155/91)  RR: 16 (18 @ 00:37) (16 - 100)  SpO2: 98% (18 @ 00:37) (98% - 99%)  Wt(kg): --  CAPILLARY BLOOD GLUCOSE      POCT Blood Glucose.: 128 mg/dL (11 Sep 2018 12:59)    I&O's Summary    10 Sep 2018 07:01  -  11 Sep 2018 07:00  --------------------------------------------------------  IN: 980 mL / OUT: 500 mL / NET: 480 mL    11 Sep 2018 07:01  -  11 Sep 2018 13:46  --------------------------------------------------------  IN: 0 mL / OUT: 625 mL / NET: -625 mL         ---___---___---___---___---___---                       PHYSICAL EXAM:    GEN: A&O X2 , NAD , comfortable  HEENT: NCAT, PERRL, MMM, no scleral icterus, hearing intact  NECK: Supple, No JVD  CVS: S1S2 , regular , No M/R/G appreciated  PULM: CTA B/L,  no W/R/R appreciated  ABD.: soft. non tender, non distended,  bowel sounds present peg noted   Extrem: intact pulses , no edema noted  Derm: No rash or ecchymosis noted wound on wound vac right leg   PSYCH: normal mood, no depression, not anxious     ---___---___---___---___---___---     <<<  LAB AND IMAGING: >>>                          7.3    12.37 )-----------( 388      ( 11 Sep 2018 08:13 )             24.6               -    141  |  102  |  33<H>  ----------------------------<  148<H>  4.5   |  30  |  0.58    Ca    8.5      11 Sep 2018 07:01             Urinalysis Basic - ( 10 Sep 2018 21:25 )    Color: Light Yellow / Appearance: Clear / S.022 / pH: x  Gluc: x / Ketone: Negative  / Bili: Negative / Urobili: Negative   Blood: x / Protein: 30 mg/dL / Nitrite: Negative   Leuk Esterase: Moderate / RBC: 21 /hpf / WBC 41 /hpf   Sq Epi: x / Non Sq Epi: 5 /hpf / Bacteria: Moderate                        [All pertinent / recent available Imaging reports and other labs reviewed]     ---___---___---___---___---___---___ ---___---___---___---___---           <<<  A S S E S S M E N T   A N D   P L A N :  >>>          -GI/DVT Prophylaxis.    --------------------------------------------  Case discussed with nurse    Education given on     >>______________________<<      Deniz Bolden .         phone   [phone]

## 2018-09-11 NOTE — PROGRESS NOTE ADULT - ASSESSMENT
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 SHe failed washout so had to have leisa and spacer. She has had multiple episodes of subsequent sepsis with respiratory failure and has gotten repeat courses of broad spectrum empiric treatment now off and out of micu.   No uncontrolled infection is apparent.   She had rashes possibly to rifampin  her incision is without signs of infection  GAYATHRI was negative  CXR reviewed, findings old, no clinical signs of active pneumonia.  pyuria is a non specific finding  Leukocytosis may be multifactorial  Plan  continue vancomycin for 4more days  supportive care  wound care  per PRS  Will consider extending therapy with minocycline after 6 week course completed given presence of knee hardware and the sustained nature of her bacteremia I think this will be reasonable.   updated at bedside

## 2018-09-11 NOTE — PROGRESS NOTE ADULT - SUBJECTIVE AND OBJECTIVE BOX
Reconsulted re RLE wound. Pt seen and examined. Doing well. 5cm supf dehiscence of RLE incision.    T(C): 36.6 (09-11-18 @ 00:37), Max: 37.7 (09-10-18 @ 22:37)  T(F): 97.8 (09-11-18 @ 00:37), Max: 99.8 (09-10-18 @ 22:37)  HR: 112 (09-11-18 @ 00:37) (112 - 113)  BP: 102/60 (09-11-18 @ 00:37) (102/60 - 155/91)  RR: 16 (09-11-18 @ 00:37) (16 - 100)  SpO2: 98% (09-11-18 @ 00:37) (98% - 99%)      10 Sep 2018 07:01  -  11 Sep 2018 07:00  --------------------------------------------------------  IN:    Enteral Tube Flush: 30 mL    IV PiggyBack: 50 mL    Pivot: 700 mL    Solution: 100 mL    Solution: 100 mL  Total IN: 980 mL    OUT:    Indwelling Catheter - Urethral: 500 mL  Total OUT: 500 mL    Total NET: 480 mL      11 Sep 2018 07:01  -  11 Sep 2018 14:27  --------------------------------------------------------  IN:  Total IN: 0 mL    OUT:    Indwelling Catheter - Urethral: 625 mL  Total OUT: 625 mL    Total NET: -625 mL          Exam:  5cm supf dehiscence of RLE incision.  No purulence, odor, erythema.                            7.3    12.37 )-----------( 388      ( 11 Sep 2018 08:13 )             24.6     09-11    141  |  102  |  33<H>  ----------------------------<  148<H>  4.5   |  30  |  0.58    Ca    8.5      11 Sep 2018 07:01        Plan:

## 2018-09-11 NOTE — SWALLOW BEDSIDE ASSESSMENT ADULT - SLP GENERAL OBSERVATIONS
Pt asleep in bed with  at b/s.  Pt easily awoken and denied pain however the Pt's  reports the Pt has had pain in upright position and therefore she has not been OOB to chair recently; per d/w OT/PT, the Pt requires Hoya lift to chair. Pt asleep in bed with  at b/s.  Pt easily awoken and denied pain however the Pt's  reports the Pt has had pain in upright position and therefore she has not been OOB to chair recently; per d/w OT/PT, the Pt requires Jah lift to chair.  Pt's  reports improved level of alertness during waking hours.  Pt achieved adequate phonation and answered some simple questions during conversation with mild cueing.

## 2018-09-11 NOTE — PROGRESS NOTE ADULT - PROBLEM SELECTOR PLAN 2
-Vit D 25-OH- 18.9 and PTH- 52 noted  - started on ergocalciferol 50,000 units once a week  -spoke with her  who notes that she has severe OP but denies her mother getting any injectable medications.  last DXA done 5 months ago with PCP/o/p pulm  o/p f/u for further

## 2018-09-11 NOTE — PROGRESS NOTE ADULT - PROBLEM SELECTOR PLAN 1
Goal glucose 100-180  Currently at goal  c/w NPH to 6 units at 12am, will change it to 10 pm at the same time of steroid dose  Pt's prednisone was decreased to 5 mg daily at 10 pm yesterday (given for chronic asthma , pt's home med as per )  Continue with correction scale q6 during the day on tube feeds.   Will adjust as needed.

## 2018-09-11 NOTE — SWALLOW BEDSIDE ASSESSMENT ADULT - ASPIRATION PRECAUTIONS
Maintain aspiration precaution for Pt' own secretions and during enteral feeds, if initiated./yes
for secretions and enteral feeding/yes
2/2 enteral feeds/yes

## 2018-09-12 LAB
ANION GAP SERPL CALC-SCNC: 12 MMOL/L — SIGNIFICANT CHANGE UP (ref 5–17)
BASOPHILS # BLD AUTO: 0.03 K/UL — SIGNIFICANT CHANGE UP (ref 0–0.2)
BASOPHILS NFR BLD AUTO: 0.2 % — SIGNIFICANT CHANGE UP (ref 0–2)
BUN SERPL-MCNC: 41 MG/DL — HIGH (ref 7–23)
CALCIUM SERPL-MCNC: 9 MG/DL — SIGNIFICANT CHANGE UP (ref 8.4–10.5)
CHLORIDE SERPL-SCNC: 99 MMOL/L — SIGNIFICANT CHANGE UP (ref 96–108)
CO2 SERPL-SCNC: 27 MMOL/L — SIGNIFICANT CHANGE UP (ref 22–31)
CREAT SERPL-MCNC: 0.72 MG/DL — SIGNIFICANT CHANGE UP (ref 0.5–1.3)
CULTURE RESULTS: NO GROWTH — SIGNIFICANT CHANGE UP
EOSINOPHIL # BLD AUTO: 0.39 K/UL — SIGNIFICANT CHANGE UP (ref 0–0.5)
EOSINOPHIL NFR BLD AUTO: 2.4 % — SIGNIFICANT CHANGE UP (ref 0–6)
GLUCOSE BLDC GLUCOMTR-MCNC: 139 MG/DL — HIGH (ref 70–99)
GLUCOSE BLDC GLUCOMTR-MCNC: 180 MG/DL — HIGH (ref 70–99)
GLUCOSE BLDC GLUCOMTR-MCNC: 200 MG/DL — HIGH (ref 70–99)
GLUCOSE SERPL-MCNC: 199 MG/DL — HIGH (ref 70–99)
HCT VFR BLD CALC: 25.4 % — LOW (ref 34.5–45)
HGB BLD-MCNC: 8 G/DL — LOW (ref 11.5–15.5)
IMM GRANULOCYTES NFR BLD AUTO: 0.5 % — SIGNIFICANT CHANGE UP (ref 0–1.5)
LYMPHOCYTES # BLD AUTO: 0.77 K/UL — LOW (ref 1–3.3)
LYMPHOCYTES # BLD AUTO: 4.7 % — LOW (ref 13–44)
MAGNESIUM SERPL-MCNC: 1.5 MG/DL — LOW (ref 1.6–2.6)
MCHC RBC-ENTMCNC: 30 PG — SIGNIFICANT CHANGE UP (ref 27–34)
MCHC RBC-ENTMCNC: 31.5 GM/DL — LOW (ref 32–36)
MCV RBC AUTO: 95.1 FL — SIGNIFICANT CHANGE UP (ref 80–100)
MONOCYTES # BLD AUTO: 1.16 K/UL — HIGH (ref 0–0.9)
MONOCYTES NFR BLD AUTO: 7.1 % — SIGNIFICANT CHANGE UP (ref 2–14)
NEUTROPHILS # BLD AUTO: 13.8 K/UL — HIGH (ref 1.8–7.4)
NEUTROPHILS NFR BLD AUTO: 85.1 % — HIGH (ref 43–77)
PLATELET # BLD AUTO: 416 K/UL — HIGH (ref 150–400)
POTASSIUM SERPL-MCNC: 4.3 MMOL/L — SIGNIFICANT CHANGE UP (ref 3.5–5.3)
POTASSIUM SERPL-SCNC: 4.3 MMOL/L — SIGNIFICANT CHANGE UP (ref 3.5–5.3)
RBC # BLD: 2.67 M/UL — LOW (ref 3.8–5.2)
RBC # FLD: 17 % — HIGH (ref 10.3–14.5)
SODIUM SERPL-SCNC: 138 MMOL/L — SIGNIFICANT CHANGE UP (ref 135–145)
SPECIMEN SOURCE: SIGNIFICANT CHANGE UP
WBC # BLD: 16.23 K/UL — HIGH (ref 3.8–10.5)
WBC # FLD AUTO: 16.23 K/UL — HIGH (ref 3.8–10.5)

## 2018-09-12 PROCEDURE — 99232 SBSQ HOSP IP/OBS MODERATE 35: CPT

## 2018-09-12 PROCEDURE — 71045 X-RAY EXAM CHEST 1 VIEW: CPT | Mod: 26

## 2018-09-12 PROCEDURE — 93010 ELECTROCARDIOGRAM REPORT: CPT

## 2018-09-12 RX ORDER — INSULIN LISPRO 100/ML
VIAL (ML) SUBCUTANEOUS EVERY 6 HOURS
Qty: 0 | Refills: 0 | Status: DISCONTINUED | OUTPATIENT
Start: 2018-09-12 | End: 2018-10-04

## 2018-09-12 RX ORDER — MAGNESIUM SULFATE 500 MG/ML
2 VIAL (ML) INJECTION ONCE
Qty: 0 | Refills: 0 | Status: COMPLETED | OUTPATIENT
Start: 2018-09-12 | End: 2018-09-12

## 2018-09-12 RX ORDER — MIRTAZAPINE 45 MG/1
15 TABLET, ORALLY DISINTEGRATING ORAL
Qty: 0 | Refills: 0 | Status: DISCONTINUED | OUTPATIENT
Start: 2018-09-12 | End: 2018-10-01

## 2018-09-12 RX ORDER — ACETAMINOPHEN 500 MG
1000 TABLET ORAL EVERY 8 HOURS
Qty: 0 | Refills: 0 | Status: COMPLETED | OUTPATIENT
Start: 2018-09-12 | End: 2018-09-12

## 2018-09-12 RX ORDER — NYSTATIN 500MM UNIT
500000 POWDER (EA) MISCELLANEOUS
Qty: 0 | Refills: 0 | Status: DISCONTINUED | OUTPATIENT
Start: 2018-09-12 | End: 2018-10-04

## 2018-09-12 RX ADMIN — OXYCODONE HYDROCHLORIDE 10 MILLIGRAM(S): 5 TABLET ORAL at 17:14

## 2018-09-12 RX ADMIN — OXYCODONE HYDROCHLORIDE 10 MILLIGRAM(S): 5 TABLET ORAL at 07:56

## 2018-09-12 RX ADMIN — Medication 0.5 MILLIGRAM(S): at 00:01

## 2018-09-12 RX ADMIN — Medication 50 GRAM(S): at 13:48

## 2018-09-12 RX ADMIN — Medication 7.5 MILLIGRAM(S): at 00:01

## 2018-09-12 RX ADMIN — Medication 1: at 18:12

## 2018-09-12 RX ADMIN — SERTRALINE 50 MILLIGRAM(S): 25 TABLET, FILM COATED ORAL at 11:47

## 2018-09-12 RX ADMIN — PANTOPRAZOLE SODIUM 40 MILLIGRAM(S): 20 TABLET, DELAYED RELEASE ORAL at 11:43

## 2018-09-12 RX ADMIN — OXYCODONE HYDROCHLORIDE 10 MILLIGRAM(S): 5 TABLET ORAL at 00:31

## 2018-09-12 RX ADMIN — MORPHINE SULFATE 1 MILLIGRAM(S): 50 CAPSULE, EXTENDED RELEASE ORAL at 18:58

## 2018-09-12 RX ADMIN — Medication 3 MILLILITER(S): at 17:09

## 2018-09-12 RX ADMIN — MORPHINE SULFATE 1 MILLIGRAM(S): 50 CAPSULE, EXTENDED RELEASE ORAL at 03:15

## 2018-09-12 RX ADMIN — MIRTAZAPINE 15 MILLIGRAM(S): 45 TABLET, ORALLY DISINTEGRATING ORAL at 23:35

## 2018-09-12 RX ADMIN — OXYCODONE HYDROCHLORIDE 10 MILLIGRAM(S): 5 TABLET ORAL at 17:45

## 2018-09-12 RX ADMIN — ENOXAPARIN SODIUM 40 MILLIGRAM(S): 100 INJECTION SUBCUTANEOUS at 11:43

## 2018-09-12 RX ADMIN — LIDOCAINE 1 PATCH: 4 CREAM TOPICAL at 23:35

## 2018-09-12 RX ADMIN — Medication 3 MILLILITER(S): at 00:01

## 2018-09-12 RX ADMIN — HUMAN INSULIN 6 UNIT(S): 100 INJECTION, SUSPENSION SUBCUTANEOUS at 00:02

## 2018-09-12 RX ADMIN — CHLORHEXIDINE GLUCONATE 1 APPLICATION(S): 213 SOLUTION TOPICAL at 07:55

## 2018-09-12 RX ADMIN — Medication 3 MILLILITER(S): at 06:17

## 2018-09-12 RX ADMIN — Medication 0.5 MILLIGRAM(S): at 11:09

## 2018-09-12 RX ADMIN — Medication 1: at 00:02

## 2018-09-12 RX ADMIN — MORPHINE SULFATE 1 MILLIGRAM(S): 50 CAPSULE, EXTENDED RELEASE ORAL at 18:44

## 2018-09-12 RX ADMIN — OXYCODONE HYDROCHLORIDE 10 MILLIGRAM(S): 5 TABLET ORAL at 08:30

## 2018-09-12 RX ADMIN — Medication 3 MILLILITER(S): at 11:10

## 2018-09-12 RX ADMIN — Medication 400 MILLIGRAM(S): at 04:05

## 2018-09-12 RX ADMIN — MORPHINE SULFATE 1 MILLIGRAM(S): 50 CAPSULE, EXTENDED RELEASE ORAL at 02:43

## 2018-09-12 RX ADMIN — Medication 1: at 06:09

## 2018-09-12 RX ADMIN — MORPHINE SULFATE 1 MILLIGRAM(S): 50 CAPSULE, EXTENDED RELEASE ORAL at 11:10

## 2018-09-12 RX ADMIN — Medication 2 MILLIGRAM(S): at 23:35

## 2018-09-12 RX ADMIN — OXYCODONE HYDROCHLORIDE 10 MILLIGRAM(S): 5 TABLET ORAL at 00:01

## 2018-09-12 RX ADMIN — ERGOCALCIFEROL 50000 UNIT(S): 1.25 CAPSULE ORAL at 11:43

## 2018-09-12 RX ADMIN — MORPHINE SULFATE 1 MILLIGRAM(S): 50 CAPSULE, EXTENDED RELEASE ORAL at 11:25

## 2018-09-12 RX ADMIN — LIDOCAINE 1 PATCH: 4 CREAM TOPICAL at 12:59

## 2018-09-12 RX ADMIN — Medication 1000 MILLIGRAM(S): at 04:45

## 2018-09-12 RX ADMIN — Medication 100 MILLIGRAM(S): at 07:55

## 2018-09-12 NOTE — CONSULT NOTE ADULT - CONSULT REQUESTED DATE/TIME
12-Sep-2018 17:55
21-Aug-2018
01-Aug-2018 13:09
21-Aug-2018 19:00
23-Aug-2018 17:54
26-Jul-2018 00:00
26-Jul-2018 18:40
26-Jul-2018 22:16
27-Jul-2018 02:52
27-Jul-2018 08:48
07-Aug-2018 22:13
05-Sep-2018 16:26
31-Aug-2018 18:51

## 2018-09-12 NOTE — PROGRESS NOTE ADULT - ASSESSMENT
68y Female POD38 s/p R TREVER explant, articulating spacer and distal femur ORIF, L acetabular fx    - Pain control  - NWB RLE, FFWB LLE  - FU PRS recs regarding wound care  - wound vac in place per plastics  - PT/OT  - DVT ppx: Lovenox  - repeat postop imaging POD45  - Dispo planning

## 2018-09-12 NOTE — PROGRESS NOTE ADULT - ASSESSMENT
mrsa sepsis    early onset dementia     dysphonia     anxiety/agitation    copd on budenoside and chronic steroids now requiring insulin

## 2018-09-12 NOTE — CONSULT NOTE ADULT - ASSESSMENT
Dementia with delirium (latter from sepsis, analgesia)  Sundowning from above   Rule out agitation secondary to pain which she cannot express, hunger (and being NPO)    Recommend  Check QTc. If it is under 500ms, can rx. Quetiapine 25mg p.o. q8h prn agitation not due to pain. If QTc is 500ms or greater, hold Seroquel.   Continue with current Sertraline, Klonopin. Told daughter that increasing the benzodiazepine dose may contribute to more confusion, risk falls, etc.   Lower Mirtazepine to 15mg qhs to increase nighttime sedation.   Avoid Haldol, Xanax, Ativan.  If agitated, assess for pain. Tylenol prn.   Will follow with you here. Thank you.    Irvin Reyes M.D.  Psychiatry  [phone]

## 2018-09-12 NOTE — PROGRESS NOTE ADULT - SUBJECTIVE AND OBJECTIVE BOX
Orthopedic Progress Note     S:  No acute events overnight, pain is well controlled.  Patient denies any nausea/vomiting, fevers/chills, SOB/chest pain.  Wound vac in place over area of dehiscence.    T(C): 36.6 (09-12-18 @ 04:03), Max: 37.6 (09-11-18 @ 18:52)  HR: 115 (09-12-18 @ 04:03) (100 - 123)  BP: 128/75 (09-12-18 @ 04:03) (128/75 - 154/89)  RR: 18 (09-12-18 @ 04:03) (18 - 18)  SpO2: 92% (09-12-18 @ 04:03) (91% - 96%)  Wt(kg): --I&O's Summary    10 Sep 2018 07:01  -  11 Sep 2018 07:00  --------------------------------------------------------  IN: 980 mL / OUT: 500 mL / NET: 480 mL    11 Sep 2018 07:01  -  12 Sep 2018 06:32  --------------------------------------------------------  IN: 520 mL / OUT: 1200 mL / NET: -680 mL        O:  Physical exam:  Gen: Alert and Oriented x3, No Acute Distress  EXT:            Dressing: wound vacuum in place holding good suction            Motor: wiggles toes            Sensation: SILT            WWP distally           Labs:                        7.3    12.37 )-----------( 388      ( 11 Sep 2018 08:13 )             24.6    09-11    141  |  102  |  33<H>  ----------------------------<  148<H>  4.5   |  30  |  0.58    Ca    8.5      11 Sep 2018 07:01

## 2018-09-12 NOTE — CONSULT NOTE ADULT - CONSULT REQUESTED BY NAME
BRET Gardner (orthopedic surgery)
Dr Bolden
Dr. Bolden
Dr. Rey
ED
[facility]
medicine
primary
Speech Team
Misha Alicia
Dr. Rey
Dr. Bolden
Dr. Bolden

## 2018-09-12 NOTE — PROGRESS NOTE ADULT - SUBJECTIVE AND OBJECTIVE BOX
CC: f/u for mrsa hip infection, fever, leukocytosis    Patient reports she is feeling well today    REVIEW OF SYSTEMS:  All other review of systems negative (Comprehensive ROS)    Antimicrobials Day #  :  vancomycin  IVPB 500 milliGRAM(s) IV Intermittent every 24 hours    Other Medications Reviewed    T(F): 100 (18 @ 14:33), Max: 100 (18 @ 14:33)  HR: 119 (18 @ 12:39)  BP: 143/82 (18 @ 12:39)  RR: 18 (18 @ 12:39)  SpO2: 94% (18 @ 12:39)  Wt(kg): --    PHYSICAL EXAM:  General: alert, no acute distress  Eyes:  anicteric, no conjunctival injection, no discharge  Oropharynx: no lesions or injection 	  Neck: supple, without adenopathy  Lungs: basilar rales  to auscultation  Heart: regular rate and rhythm; no murmur, rubs or gallops  Abdomen: soft, nondistended, nontender, without mass or organomegaly, peg ok  Skin: no lesions  Extremities: left hip wound with vac in place  Neurologic: alert, confuses, moves all extremities  back: very superficial ulcer over sacrum  LAB RESULTS:                        8.0    16.23 )-----------( 416      ( 12 Sep 2018 07:39 )             25.4         138  |  99  |  41<H>  ----------------------------<  199<H>  4.3   |  27  |  0.72    Ca    9.0      12 Sep 2018 06:38  Mg     1.5             Urinalysis Basic - ( 10 Sep 2018 21:25 )    Color: Light Yellow / Appearance: Clear / S.022 / pH: x  Gluc: x / Ketone: Negative  / Bili: Negative / Urobili: Negative   Blood: x / Protein: 30 mg/dL / Nitrite: Negative   Leuk Esterase: Moderate / RBC: 21 /hpf / WBC 41 /hpf   Sq Epi: x / Non Sq Epi: 5 /hpf / Bacteria: Moderate      MICROBIOLOGY:  RECENT CULTURES:   @ 15:06 .Urine Catheterized     No growth       @ 09:24 .Blood Blood     No growth to date.       @ 00:48 .Urine Catheterized     No growth          RADIOLOGY REVIEWED:    < from: Xray Chest 1 View- PORTABLE-Routine (09.10.18 @ 20:42) >  Impression:    Multifocal patchy consolidations which may be due to infection in the   right clinical setting. Follow-up recommended to ensure resolution      < end of copied text >    < from: CT Lower Extremity No Cont, Right (09.10.18 @ 14:30) >  IMPRESSION:    Limited exam secondary to streak artifact. No discrete collection   visualized. Diffuse subcutaneous and myofascial edema along the length of   the right thigh.       end of copied text >    Assessment:  Patient with dementia, s/p fall with new left hip fx and found to have severe sepsis from right thr mrsa infection with prolonged bacteremia, s/p leisa and spacer, multiple episodes of severe sepsis with respiratory failure presumed from aspiration, s/p peg. She has some leukocytosis and mild low grade fever but clinically she is the most alert I have seen her in weeks and is not having any respiratory distress or pain to suggest an infection.   Plan:  follow up latest cultures  will repeat cxr to see if any progression of the infiltrates  3 more days of iv vancomycin then extended minocycline due to presence of right tkr and prolonged bacteremia  will monitor off additional antimicrobics for now

## 2018-09-12 NOTE — PROGRESS NOTE ADULT - SUBJECTIVE AND OBJECTIVE BOX
Diabetes Follow up note:  Interval Hx:  68 year old female w/steroid induced hyperglycemia on chronic prednisone on continuous tube feeds. BG values 100-200mg/dl on present insulin regimen w/out hypoglycemia. Family at bedside. Endorse dementia has worsened during hospitalization. Tolerating TF at goal w/out residuals      Review of Systems:  General: + fevers  GI: Tolerating TFs without any N/V/D/ABD PAIN.  CV: No CP/SOB  ENDO: No S&Sx of hypoglycemia  MEDS:    insulin lispro (HumaLOG) corrective regimen sliding scale   SubCutaneous every 6 hours  insulin NPH human recombinant 6 Unit(s) SubCutaneous <User Schedule>  predniSONE   Tablet 7.5 milliGRAM(s) Oral <User Schedule>    vancomycin  IVPB 500 milliGRAM(s) IV Intermittent every 24 hours    Allergies    ASA; dye contrast (Anaphylaxis)  aspirin (Short breath)  divalproex sodium (Other (Unknown))  Haldol (Other (Unknown))  penicillin (Short breath; Rash)  sulfa drugs (Short breath; Rash)  Xanax (Other (Unknown))        PE:  General: Female lying in bed. NAD.   Vital Signs Last 24 Hrs  T(C): 37.4 (12 Sep 2018 12:39), Max: 37.6 (11 Sep 2018 18:52)  T(F): 99.4 (12 Sep 2018 12:39), Max: 99.6 (11 Sep 2018 18:52)  HR: 119 (12 Sep 2018 12:39) (100 - 123)  BP: 143/82 (12 Sep 2018 12:39) (128/75 - 154/89)  BP(mean): --  RR: 18 (12 Sep 2018 12:39) (18 - 18)  SpO2: 94% (12 Sep 2018 12:39) (91% - 96%)  Abd: Soft, NT,ND, PEG in place.   Extremities: Warm. Wound vac in place L leg  Neuro: A&O X1    LABS:    POCT Blood Glucose.: 139 mg/dL (09-12-18 @ 12:38)  POCT Blood Glucose.: 200 mg/dL (09-12-18 @ 05:50)  POCT Blood Glucose.: 171 mg/dL (09-11-18 @ 23:54)  POCT Blood Glucose.: 141 mg/dL (09-11-18 @ 18:07)  POCT Blood Glucose.: 128 mg/dL (09-11-18 @ 12:59)  POCT Blood Glucose.: 169 mg/dL (09-11-18 @ 06:36)  POCT Blood Glucose.: 172 mg/dL (09-11-18 @ 00:27)  POCT Blood Glucose.: 122 mg/dL (09-10-18 @ 18:21)  POCT Blood Glucose.: 127 mg/dL (09-10-18 @ 13:06)  POCT Blood Glucose.: 151 mg/dL (09-10-18 @ 11:47)  POCT Blood Glucose.: 211 mg/dL (09-10-18 @ 06:34)  POCT Blood Glucose.: 170 mg/dL (09-10-18 @ 00:24)  POCT Blood Glucose.: 124 mg/dL (09-09-18 @ 18:13)                            8.0    16.23 )-----------( 416      ( 12 Sep 2018 07:39 )             25.4       09-12    138  |  99  |  41<H>  ----------------------------<  199<H>  4.3   |  27  |  0.72    Ca    9.0      12 Sep 2018 06:38  Mg     1.5     09-12          Hemoglobin A1C, Whole Blood: 5.5 % [4.0 - 5.6] (07-27-18 @ 09:02)            Contact number: darian [phone] or [phone]

## 2018-09-12 NOTE — PROGRESS NOTE ADULT - SUBJECTIVE AND OBJECTIVE BOX
INTERVAL HPI/OVERNIGHT EVENTS: doing well from GI standpoint, tolerating PEG feeds at goal, pain control seems to be main issue    MEDICATIONS  (STANDING):  ALBUTerol/ipratropium for Nebulization 3 milliLiter(s) Nebulizer every 6 hours  buDESOnide   0.5 milliGRAM(s) Respule 0.5 milliGRAM(s) Inhalation every 12 hours  chlorhexidine 4% Liquid 1 Application(s) Topical <User Schedule>  clonazePAM Tablet 2 milliGRAM(s) Oral <User Schedule>  dextrose 5%. 1000 milliLiter(s) (50 mL/Hr) IV Continuous <Continuous>  dextrose 50% Injectable 12.5 Gram(s) IV Push once  dextrose 50% Injectable 25 Gram(s) IV Push once  dextrose 50% Injectable 25 Gram(s) IV Push once  enoxaparin Injectable 40 milliGRAM(s) SubCutaneous daily  ergocalciferol 99391 Unit(s) Oral every week  insulin lispro (HumaLOG) corrective regimen sliding scale   SubCutaneous every 6 hours  insulin NPH human recombinant 6 Unit(s) SubCutaneous <User Schedule>  lidocaine   Patch 1 Patch Transdermal every 24 hours  mirtazapine 45 milliGRAM(s) Oral <User Schedule>  pantoprazole  Injectable 40 milliGRAM(s) IV Push every 24 hours  predniSONE   Tablet 7.5 milliGRAM(s) Oral <User Schedule>  QUEtiapine 50 milliGRAM(s) Oral at bedtime  sertraline 50 milliGRAM(s) Oral daily  vancomycin  IVPB 500 milliGRAM(s) IV Intermittent every 24 hours    MEDICATIONS  (PRN):  acetaminophen  IVPB .. 1000 milliGRAM(s) IV Intermittent every 8 hours PRN Mild Pain (1 - 3), Moderate Pain (4 - 6), Severe Pain (7 - 10)  dextrose 40% Gel 15 Gram(s) Oral once PRN Blood Glucose LESS THAN 70 milliGRAM(s)/deciLiter  glucagon  Injectable 1 milliGRAM(s) IntraMuscular once PRN Glucose <70 milliGRAM(s)/deciLiter  morphine  - Injectable 1 milliGRAM(s) IV Push every 6 hours PRN breakthrough pain  nystatin Powder 1 Application(s) Topical two times a day PRN rash/itchiness  oxyCODONE    IR 10 milliGRAM(s) Oral every 8 hours PRN Severe Pain (7 - 10)  QUEtiapine 25 milliGRAM(s) Oral at bedtime PRN agitation      Allergies    ASA; dye contrast (Anaphylaxis)  aspirin (Short breath)  divalproex sodium (Other (Unknown))  Haldol (Other (Unknown))  penicillin (Short breath; Rash)  sulfa drugs (Short breath; Rash)  Xanax (Other (Unknown))    Intolerances            PHYSICAL EXAM:   Vital Signs:  Vital Signs Last 24 Hrs  T(C): 36.6 (12 Sep 2018 04:03), Max: 37.6 (11 Sep 2018 18:52)  T(F): 97.9 (12 Sep 2018 04:03), Max: 99.6 (11 Sep 2018 18:52)  HR: 115 (12 Sep 2018 04:03) (100 - 123)  BP: 128/75 (12 Sep 2018 04:03) (128/75 - 154/89)  BP(mean): --  RR: 18 (12 Sep 2018 04:03) (18 - 18)  SpO2: 92% (12 Sep 2018 04:03) (91% - 96%)  Daily     Daily Weight in k.4 (12 Sep 2018 06:13)    GENERAL:  no distress  HEENT:  NC/AT,  anicteric  CHEST:   no increased effort, breath sounds clear  HEART:  Regular rhythm  ABDOMEN:  Soft, non-tender, non-distended, normoactive bowel sounds,  no masses ,PEG site is clean  EXTEREMITIES:  no cyanosis      LABS:                        7.3    12.37 )-----------( 388      ( 11 Sep 2018 08:13 )             24.6     09-12    138  |  99  |  41<H>  ----------------------------<  199<H>  4.3   |  27  |  0.72    Ca    9.0      12 Sep 2018 06:38  Mg     1.5     09-12        Urinalysis Basic - ( 10 Sep 2018 21:25 )    Color: Light Yellow / Appearance: Clear / S.022 / pH: x  Gluc: x / Ketone: Negative  / Bili: Negative / Urobili: Negative   Blood: x / Protein: 30 mg/dL / Nitrite: Negative   Leuk Esterase: Moderate / RBC: 21 /hpf / WBC 41 /hpf   Sq Epi: x / Non Sq Epi: 5 /hpf / Bacteria: Moderate        RADIOLOGY & ADDITIONAL TESTS:

## 2018-09-12 NOTE — CONSULT NOTE ADULT - PROVIDER SPECIALTY LIST ADULT
Cardiology
ENT
Gastroenterology
Infectious Disease
Nephrology
Orthopedics
Podiatry
SICU
Trauma Surgery
Dental
Plastic Surgery
Psychiatry
Endocrinology

## 2018-09-12 NOTE — CONSULT NOTE ADULT - SUBJECTIVE AND OBJECTIVE BOX
Chart reviewed and patient seen by undersigned    Asked to consult for agitation    Historians include charts including current and my note from last March, the pt's , the pt's daughter at bedside and Dr. Bolden    History of Present Illness  The patient is a 68 year old  WF with history of dementia, anxiety, depression. Seen by me last admission for agitation. Meds such as Seroquel and Methadone needed to be tapered/held due to prolonged QTc. Readmitted with sepsis. Unfortunate course of events including hip fractures and after hip surgery, sepsis of hip prosthesis. She has been sundowning. Daughter tells me the pt. attempts to pull out her lines and has recent visual hallucinations of people in the room. Worsening mental status since she is here per her daughter's report.     Past Psychiatric History  As above. Has been on chronic benzodiazepines. History of paradoxical agitation with Haldol, Xanax, Depakote. Never suicidal. Not eating due to thrush and failed her swallow study. Gets psychiatric meds from Dr. Bolden and neurologist Dr. Cheek. No substance abuse history. Failed Olanzapine.     Psychosocial History  Lives with family. Was on home hospice last spring (now it is discontinued).     PAST MEDICAL & SURGICAL HISTORY:  CVA (cerebral vascular accident)  Fatty pancreas  PNA (pneumonia)  Pulmonary HTN  IGT (impaired glucose tolerance)  Ulcerative colitis  Acid reflux  Anxiety  Depression  Mouth sores  HLD (hyperlipidemia)  Asthma  Humeral head fracture  H/O: hysterectomy  H/O cataract extraction, left  History of knee replacement      FAMILY HISTORY:  Family history of dementia (Grandparent)  Family history of colon cancer (Grandparent)      Vital Signs Last 24 Hrs  T(C): 37.8 (12 Sep 2018 14:33), Max: 37.8 (12 Sep 2018 14:33)  T(F): 100 (12 Sep 2018 14:33), Max: 100 (12 Sep 2018 14:33)  HR: 119 (12 Sep 2018 12:39) (100 - 123)  BP: 143/82 (12 Sep 2018 12:39) (128/75 - 154/89)  BP(mean): --  RR: 18 (12 Sep 2018 12:39) (18 - 18)  SpO2: 94% (12 Sep 2018 12:39) (91% - 96%)                          8.0    16.23 )-----------( 416      ( 12 Sep 2018 07:39 )             25.4       09-12    138  |  99  |  41<H>  ----------------------------<  199<H>  4.3   |  27  |  0.72    Ca    9.0      12 Sep 2018 06:38  Mg     1.5     09-12              MEDICATIONS  (STANDING):  ALBUTerol/ipratropium for Nebulization 3 milliLiter(s) Nebulizer every 6 hours  buDESOnide   0.5 milliGRAM(s) Respule 0.5 milliGRAM(s) Inhalation every 12 hours  chlorhexidine 4% Liquid 1 Application(s) Topical <User Schedule>  clonazePAM Tablet 2 milliGRAM(s) Oral <User Schedule>  dextrose 5%. 1000 milliLiter(s) (50 mL/Hr) IV Continuous <Continuous>  dextrose 50% Injectable 12.5 Gram(s) IV Push once  dextrose 50% Injectable 25 Gram(s) IV Push once  dextrose 50% Injectable 25 Gram(s) IV Push once  enoxaparin Injectable 40 milliGRAM(s) SubCutaneous daily  ergocalciferol 42336 Unit(s) Oral every week  insulin lispro (HumaLOG) corrective regimen sliding scale   SubCutaneous every 6 hours  insulin NPH human recombinant 6 Unit(s) SubCutaneous <User Schedule>  lidocaine   Patch 1 Patch Transdermal every 24 hours  mirtazapine 45 milliGRAM(s) Oral <User Schedule>  pantoprazole  Injectable 40 milliGRAM(s) IV Push every 24 hours  predniSONE   Tablet 7.5 milliGRAM(s) Oral <User Schedule>  QUEtiapine 50 milliGRAM(s) Oral at bedtime  sertraline 50 milliGRAM(s) Oral daily  vancomycin  IVPB 500 milliGRAM(s) IV Intermittent every 24 hours    MEDICATIONS  (PRN):  acetaminophen  IVPB .. 1000 milliGRAM(s) IV Intermittent every 8 hours PRN Mild Pain (1 - 3), Moderate Pain (4 - 6), Severe Pain (7 - 10)  dextrose 40% Gel 15 Gram(s) Oral once PRN Blood Glucose LESS THAN 70 milliGRAM(s)/deciLiter  glucagon  Injectable 1 milliGRAM(s) IntraMuscular once PRN Glucose <70 milliGRAM(s)/deciLiter  morphine  - Injectable 1 milliGRAM(s) IV Push every 6 hours PRN breakthrough pain  nystatin Powder 1 Application(s) Topical two times a day PRN rash/itchiness  oxyCODONE    IR 10 milliGRAM(s) Oral every 8 hours PRN Severe Pain (7 - 10)  QUEtiapine 25 milliGRAM(s) Oral at bedtime PRN agitation      Elderly, cachectic WF in bed, picking at bedsheets, cooperative, alert and oriented x 1. Insight and judgment are impaired. Speech is minimal and gives 1-2 word responses but coherent with normal rate and volume. No hallucinations nor delusions. The patient denied suicidal and homicidal ideation and plan. Mood is euthymic and affect constricted. Attention and concentration, short term memory, and long term memory impaired.      Suicidal risk assessment  Risk factors include dementia, delirium  Protective factors include no plan/past attempts/guns/substance abuse. Has good social supports.

## 2018-09-12 NOTE — PROGRESS NOTE ADULT - SUBJECTIVE AND OBJECTIVE BOX
---___---___---___---___---___---___ ---___---___---___---___---___---___---___---___---___---                    <<<  M E D I C A L   A T T E N D I N G    F O L L O W    U P   N O T E  >>>    - Patient seen and examined by me approximately thirty minutes ago.  - In summary, patient is a 68y year old Female who originally presented with mrsa sepsis from infected hardware. now patient oiv abx and has dysphonia and has a peg as well   - Patient today overall doing ok, comfortable, eating OK.     ---___---___---___---___---___---      <<<  MEDICATIONS:  >>>    MEDICATIONS  (STANDING):  ALBUTerol/ipratropium for Nebulization 3 milliLiter(s) Nebulizer every 6 hours  buDESOnide   0.5 milliGRAM(s) Respule 0.5 milliGRAM(s) Inhalation every 12 hours  chlorhexidine 4% Liquid 1 Application(s) Topical <User Schedule>  clonazePAM Tablet 2 milliGRAM(s) Oral <User Schedule>  dextrose 5%. 1000 milliLiter(s) (50 mL/Hr) IV Continuous <Continuous>  dextrose 50% Injectable 12.5 Gram(s) IV Push once  dextrose 50% Injectable 25 Gram(s) IV Push once  dextrose 50% Injectable 25 Gram(s) IV Push once  enoxaparin Injectable 40 milliGRAM(s) SubCutaneous daily  ergocalciferol 25701 Unit(s) Oral every week  insulin lispro (HumaLOG) corrective regimen sliding scale   SubCutaneous every 6 hours  insulin NPH human recombinant 6 Unit(s) SubCutaneous <User Schedule>  lidocaine   Patch 1 Patch Transdermal every 24 hours  mirtazapine 45 milliGRAM(s) Oral <User Schedule>  pantoprazole  Injectable 40 milliGRAM(s) IV Push every 24 hours  predniSONE   Tablet 7.5 milliGRAM(s) Oral <User Schedule>  QUEtiapine 50 milliGRAM(s) Oral at bedtime  sertraline 50 milliGRAM(s) Oral daily  vancomycin  IVPB 500 milliGRAM(s) IV Intermittent every 24 hours      MEDICATIONS  (PRN):  acetaminophen  IVPB .. 1000 milliGRAM(s) IV Intermittent every 8 hours PRN Mild Pain (1 - 3), Moderate Pain (4 - 6), Severe Pain (7 - 10)  dextrose 40% Gel 15 Gram(s) Oral once PRN Blood Glucose LESS THAN 70 milliGRAM(s)/deciLiter  glucagon  Injectable 1 milliGRAM(s) IntraMuscular once PRN Glucose <70 milliGRAM(s)/deciLiter  morphine  - Injectable 1 milliGRAM(s) IV Push every 6 hours PRN breakthrough pain  nystatin Powder 1 Application(s) Topical two times a day PRN rash/itchiness  oxyCODONE    IR 10 milliGRAM(s) Oral every 8 hours PRN Severe Pain (7 - 10)  QUEtiapine 25 milliGRAM(s) Oral at bedtime PRN agitation       ---___---___---___---___---___---     <<<REVIEW OF SYSTEM: >>>    GEN: no fever, no chills, no pain  RESP: no SOB, no cough, no sputum  CVS: no chest pain, no palpitations, no edema  GI: no abdominal pain, no nausea, no vomiting, no constipation, no diarrhea  : no dysurea, no frequency  NEURO: no headache, no dizziness  PSYCH: no depression, not anxious  Derm : no itching, no rash     ---___---___---___---___---___---          <<<  VITAL SIGNS: >>>    T(F): 97.9 (18 @ 04:03), Max: 99.6 (18 @ 18:52)  HR: 115 (18 @ 04:03) (100 - 123)  BP: 128/75 (18 @ 04:03) (128/75 - 154/89)  RR: 18 (18 @ 04:03) (18 - 18)  SpO2: 92% (18 @ 04:03) (91% - 96%)  Wt(kg): --  CAPILLARY BLOOD GLUCOSE      POCT Blood Glucose.: 200 mg/dL (12 Sep 2018 05:50)    I&O's Summary    11 Sep 2018 07:01  -  12 Sep 2018 07:00  --------------------------------------------------------  IN: 520 mL / OUT: 1200 mL / NET: -680 mL         ---___---___---___---___---___---                       PHYSICAL EXAM:    GEN: A&O X 2 , NAD , comfortable  HEENT: NCAT, PERRL, MMM, no scleral icterus, hearing intact  NECK: Supple, No JVD  CVS: S1S2 , regular , No M/R/G appreciated  PULM: CTA B/L,  slight wheezing noted   ABD.: soft. non tender, non distended,  bowel sounds present peg notes   Extrem: intact pulses , no edema noted  Derm:wond ot completely healed on the right leg  now on wound vac   PSYCH: normal mood, no depression,  anxious     ---___---___---___---___---___---     <<<  LAB AND IMAGING: >>>                          7.3    12.37 )-----------( 388      ( 11 Sep 2018 08:13 )             24.6               09-11    141  |  102  |  33<H>  ----------------------------<  148<H>  4.5   |  30  |  0.58    Ca    8.5      11 Sep 2018 07:01             Urinalysis Basic - ( 10 Sep 2018 21:25 )    Color: Light Yellow / Appearance: Clear / S.022 / pH: x  Gluc: x / Ketone: Negative  / Bili: Negative / Urobili: Negative   Blood: x / Protein: 30 mg/dL / Nitrite: Negative   Leuk Esterase: Moderate / RBC: 21 /hpf / WBC 41 /hpf   Sq Epi: x / Non Sq Epi: 5 /hpf / Bacteria: Moderate                        [All pertinent / recent available Imaging reports and other labs reviewed]     ---___---___---___---___---___---___ ---___---___---___---___---           <<<  A S S E S S M E N T   A N D   P L A N :  >>>          -GI/DVT Prophylaxis.    --------------------------------------------    >>______________________<<      Deniz Bolden .         phone   [phone]

## 2018-09-12 NOTE — CONSULT NOTE ADULT - CONSULT REASON
Chipped tooth during intubation
Anxiety, agitation at night
Chest pain, elevated troponin
FEES
Hemodynamic monitoring
Hyponatremia
Multiple fractures
b/l heel lesions
dysphagia
leukocytosis
r/o Right hip infection
Hyperglycemia
R LE wound

## 2018-09-12 NOTE — PROGRESS NOTE ADULT - PROBLEM SELECTOR PLAN 1
-test BG Q6h  -c/w NPH 6 units given daily at 12am w/Prednisone 7.5mg daily  -Adjust HUmalog to low correction scale Q6h  -Please contact endocrine team if any changes made to steroid regimen, will need insulin adjustment  will see as needed  pager: [phone]

## 2018-09-12 NOTE — PROGRESS NOTE ADULT - ASSESSMENT
68 year old female w/hyperglycemia (chronic steroid use) on continuous tube feeds here w/weakness and MRSA bacteremia w/wound vac. BG values mostly at goal on present insulin regimen. BG goal 100-200mg/dl; given advanced dementia would have less stringent goal.

## 2018-09-13 LAB
ANION GAP SERPL CALC-SCNC: 12 MMOL/L — SIGNIFICANT CHANGE UP (ref 5–17)
APPEARANCE UR: CLEAR — SIGNIFICANT CHANGE UP
BACTERIA # UR AUTO: ABNORMAL
BILIRUB UR-MCNC: NEGATIVE — SIGNIFICANT CHANGE UP
BUN SERPL-MCNC: 39 MG/DL — HIGH (ref 7–23)
CALCIUM SERPL-MCNC: 8.8 MG/DL — SIGNIFICANT CHANGE UP (ref 8.4–10.5)
CHLORIDE SERPL-SCNC: 100 MMOL/L — SIGNIFICANT CHANGE UP (ref 96–108)
CO2 SERPL-SCNC: 28 MMOL/L — SIGNIFICANT CHANGE UP (ref 22–31)
COLOR SPEC: YELLOW — SIGNIFICANT CHANGE UP
COMMENT - URINE: SIGNIFICANT CHANGE UP
COMMENT - URINE: SIGNIFICANT CHANGE UP
CREAT SERPL-MCNC: 0.64 MG/DL — SIGNIFICANT CHANGE UP (ref 0.5–1.3)
DIFF PNL FLD: ABNORMAL
EPI CELLS # UR: 15 /HPF — HIGH (ref 0–5)
GLUCOSE BLDC GLUCOMTR-MCNC: 126 MG/DL — HIGH (ref 70–99)
GLUCOSE BLDC GLUCOMTR-MCNC: 143 MG/DL — HIGH (ref 70–99)
GLUCOSE BLDC GLUCOMTR-MCNC: 156 MG/DL — HIGH (ref 70–99)
GLUCOSE BLDC GLUCOMTR-MCNC: 176 MG/DL — HIGH (ref 70–99)
GLUCOSE BLDC GLUCOMTR-MCNC: 218 MG/DL — HIGH (ref 70–99)
GLUCOSE SERPL-MCNC: 201 MG/DL — HIGH (ref 70–99)
GLUCOSE UR QL: NEGATIVE MG/DL — SIGNIFICANT CHANGE UP
HCT VFR BLD CALC: 24.4 % — LOW (ref 34.5–45)
HGB BLD-MCNC: 7.8 G/DL — LOW (ref 11.5–15.5)
HYALINE CASTS # UR AUTO: 8 /LPF — HIGH (ref 0–7)
KETONES UR-MCNC: NEGATIVE — SIGNIFICANT CHANGE UP
LEUKOCYTE ESTERASE UR-ACNC: ABNORMAL
MAGNESIUM SERPL-MCNC: 2 MG/DL — SIGNIFICANT CHANGE UP (ref 1.6–2.6)
MCHC RBC-ENTMCNC: 30.1 PG — SIGNIFICANT CHANGE UP (ref 27–34)
MCHC RBC-ENTMCNC: 32 GM/DL — SIGNIFICANT CHANGE UP (ref 32–36)
MCV RBC AUTO: 94.2 FL — SIGNIFICANT CHANGE UP (ref 80–100)
NITRITE UR-MCNC: NEGATIVE — SIGNIFICANT CHANGE UP
PH UR: 7.5 — SIGNIFICANT CHANGE UP (ref 5–8)
PLATELET # BLD AUTO: 353 K/UL — SIGNIFICANT CHANGE UP (ref 150–400)
POTASSIUM SERPL-MCNC: 4.5 MMOL/L — SIGNIFICANT CHANGE UP (ref 3.5–5.3)
POTASSIUM SERPL-SCNC: 4.5 MMOL/L — SIGNIFICANT CHANGE UP (ref 3.5–5.3)
PROT UR-MCNC: 100 MG/DL
RBC # BLD: 2.59 M/UL — LOW (ref 3.8–5.2)
RBC # FLD: 17.7 % — HIGH (ref 10.3–14.5)
RBC CASTS # UR COMP ASSIST: 334 /HPF — HIGH (ref 0–4)
SODIUM SERPL-SCNC: 140 MMOL/L — SIGNIFICANT CHANGE UP (ref 135–145)
SP GR SPEC: 1.02 — SIGNIFICANT CHANGE UP (ref 1.01–1.02)
UROBILINOGEN FLD QL: NEGATIVE MG/DL — SIGNIFICANT CHANGE UP
VANCOMYCIN TROUGH SERPL-MCNC: 10.5 UG/ML — SIGNIFICANT CHANGE UP (ref 10–20)
WBC # BLD: 12.87 K/UL — HIGH (ref 3.8–10.5)
WBC # FLD AUTO: 12.87 K/UL — HIGH (ref 3.8–10.5)
WBC UR QL: 37 /HPF — HIGH (ref 0–5)

## 2018-09-13 PROCEDURE — 99233 SBSQ HOSP IP/OBS HIGH 50: CPT

## 2018-09-13 PROCEDURE — 93010 ELECTROCARDIOGRAM REPORT: CPT

## 2018-09-13 PROCEDURE — 99232 SBSQ HOSP IP/OBS MODERATE 35: CPT

## 2018-09-13 RX ORDER — CEFEPIME 1 G/1
INJECTION, POWDER, FOR SOLUTION INTRAMUSCULAR; INTRAVENOUS
Qty: 0 | Refills: 0 | Status: DISCONTINUED | OUTPATIENT
Start: 2018-09-13 | End: 2018-09-18

## 2018-09-13 RX ORDER — CEFEPIME 1 G/1
1000 INJECTION, POWDER, FOR SOLUTION INTRAMUSCULAR; INTRAVENOUS EVERY 12 HOURS
Qty: 0 | Refills: 0 | Status: DISCONTINUED | OUTPATIENT
Start: 2018-09-14 | End: 2018-09-18

## 2018-09-13 RX ORDER — CEFEPIME 1 G/1
1000 INJECTION, POWDER, FOR SOLUTION INTRAMUSCULAR; INTRAVENOUS ONCE
Qty: 0 | Refills: 0 | Status: COMPLETED | OUTPATIENT
Start: 2018-09-13 | End: 2018-09-13

## 2018-09-13 RX ORDER — HUMAN INSULIN 100 [IU]/ML
7 INJECTION, SUSPENSION SUBCUTANEOUS
Qty: 0 | Refills: 0 | Status: DISCONTINUED | OUTPATIENT
Start: 2018-09-13 | End: 2018-09-18

## 2018-09-13 RX ADMIN — Medication 500000 UNIT(S): at 12:18

## 2018-09-13 RX ADMIN — Medication 1: at 00:41

## 2018-09-13 RX ADMIN — OXYCODONE HYDROCHLORIDE 10 MILLIGRAM(S): 5 TABLET ORAL at 11:22

## 2018-09-13 RX ADMIN — PANTOPRAZOLE SODIUM 40 MILLIGRAM(S): 20 TABLET, DELAYED RELEASE ORAL at 12:18

## 2018-09-13 RX ADMIN — Medication 0.5 MILLIGRAM(S): at 23:49

## 2018-09-13 RX ADMIN — Medication 100 MILLIGRAM(S): at 10:46

## 2018-09-13 RX ADMIN — LIDOCAINE 1 PATCH: 4 CREAM TOPICAL at 22:23

## 2018-09-13 RX ADMIN — Medication 7.5 MILLIGRAM(S): at 23:49

## 2018-09-13 RX ADMIN — MORPHINE SULFATE 1 MILLIGRAM(S): 50 CAPSULE, EXTENDED RELEASE ORAL at 12:15

## 2018-09-13 RX ADMIN — Medication 500000 UNIT(S): at 17:56

## 2018-09-13 RX ADMIN — MORPHINE SULFATE 1 MILLIGRAM(S): 50 CAPSULE, EXTENDED RELEASE ORAL at 12:45

## 2018-09-13 RX ADMIN — Medication 0.5 MILLIGRAM(S): at 00:37

## 2018-09-13 RX ADMIN — Medication 3 MILLILITER(S): at 12:18

## 2018-09-13 RX ADMIN — Medication 2 MILLIGRAM(S): at 22:22

## 2018-09-13 RX ADMIN — LIDOCAINE 1 PATCH: 4 CREAM TOPICAL at 12:00

## 2018-09-13 RX ADMIN — Medication 7.5 MILLIGRAM(S): at 00:37

## 2018-09-13 RX ADMIN — Medication 1000 MILLIGRAM(S): at 02:30

## 2018-09-13 RX ADMIN — CEFEPIME 100 MILLIGRAM(S): 1 INJECTION, POWDER, FOR SOLUTION INTRAMUSCULAR; INTRAVENOUS at 16:35

## 2018-09-13 RX ADMIN — Medication 2: at 06:10

## 2018-09-13 RX ADMIN — OXYCODONE HYDROCHLORIDE 10 MILLIGRAM(S): 5 TABLET ORAL at 01:34

## 2018-09-13 RX ADMIN — Medication 1: at 12:20

## 2018-09-13 RX ADMIN — ENOXAPARIN SODIUM 40 MILLIGRAM(S): 100 INJECTION SUBCUTANEOUS at 12:18

## 2018-09-13 RX ADMIN — MORPHINE SULFATE 1 MILLIGRAM(S): 50 CAPSULE, EXTENDED RELEASE ORAL at 01:04

## 2018-09-13 RX ADMIN — OXYCODONE HYDROCHLORIDE 10 MILLIGRAM(S): 5 TABLET ORAL at 20:36

## 2018-09-13 RX ADMIN — MORPHINE SULFATE 1 MILLIGRAM(S): 50 CAPSULE, EXTENDED RELEASE ORAL at 00:34

## 2018-09-13 RX ADMIN — OXYCODONE HYDROCHLORIDE 10 MILLIGRAM(S): 5 TABLET ORAL at 01:04

## 2018-09-13 RX ADMIN — OXYCODONE HYDROCHLORIDE 10 MILLIGRAM(S): 5 TABLET ORAL at 21:10

## 2018-09-13 RX ADMIN — Medication 3 MILLILITER(S): at 17:56

## 2018-09-13 RX ADMIN — Medication 3 MILLILITER(S): at 06:10

## 2018-09-13 RX ADMIN — CHLORHEXIDINE GLUCONATE 1 APPLICATION(S): 213 SOLUTION TOPICAL at 10:50

## 2018-09-13 RX ADMIN — OXYCODONE HYDROCHLORIDE 10 MILLIGRAM(S): 5 TABLET ORAL at 10:52

## 2018-09-13 RX ADMIN — HUMAN INSULIN 6 UNIT(S): 100 INJECTION, SUSPENSION SUBCUTANEOUS at 00:37

## 2018-09-13 RX ADMIN — MIRTAZAPINE 15 MILLIGRAM(S): 45 TABLET, ORALLY DISINTEGRATING ORAL at 22:23

## 2018-09-13 RX ADMIN — MORPHINE SULFATE 1 MILLIGRAM(S): 50 CAPSULE, EXTENDED RELEASE ORAL at 17:55

## 2018-09-13 RX ADMIN — Medication 500000 UNIT(S): at 23:49

## 2018-09-13 RX ADMIN — Medication 3 MILLILITER(S): at 23:48

## 2018-09-13 RX ADMIN — Medication 0.5 MILLIGRAM(S): at 12:18

## 2018-09-13 RX ADMIN — HUMAN INSULIN 7 UNIT(S): 100 INJECTION, SUSPENSION SUBCUTANEOUS at 23:48

## 2018-09-13 RX ADMIN — Medication 3 MILLILITER(S): at 00:37

## 2018-09-13 RX ADMIN — MORPHINE SULFATE 1 MILLIGRAM(S): 50 CAPSULE, EXTENDED RELEASE ORAL at 18:15

## 2018-09-13 RX ADMIN — SERTRALINE 50 MILLIGRAM(S): 25 TABLET, FILM COATED ORAL at 12:18

## 2018-09-13 NOTE — PROGRESS NOTE ADULT - PROBLEM SELECTOR PLAN 1
-test BG Q6h  -Increase NPH 7 units Q daily at 12am w/prednisone administration  -c/w Humalog low correction scale Q6h  -will need to re-evaluate insulin regimen if taken off tube feeds. If not tolerating feeds, may need to increase surveillance for hypoglycemia and may require D5 infusion  -discussed plan w/pt's .   pager: [phone]

## 2018-09-13 NOTE — PROGRESS NOTE ADULT - ASSESSMENT
Dementia with delirium  Off Seroquel due to QTc prolongation  Remeron 15mg helped with sundowning.    Recommend  Continue with current meds.  Cardiology followup to assess if pt. can resume Seroquel given prolonged QTc (with tachycardia-?falsely elevated?).  Discussed at length with  and NP.    Irvin Reyes M.D.  Psychiatry  [phone]

## 2018-09-13 NOTE — PROVIDER CONTACT NOTE (OTHER) - RECOMMENDATIONS
BRET Zavala made aware BRET Zavala made aware. ANM at pt bedside. Rectal temp discussed with nurse supervisor Corrie KIM per ANM rectal temp may be positional. Oral temp taken temp. wdl orally. pt no distress.

## 2018-09-13 NOTE — PROVIDER CONTACT NOTE (OTHER) - ASSESSMENT
pt a and o x1, pt resting comfortably, no distress, pt spouse & daughter at bedside. gavin draining yellow urine

## 2018-09-13 NOTE — PROGRESS NOTE ADULT - SUBJECTIVE AND OBJECTIVE BOX
HPI:  Psychiatry recommending restarting Seroquel.  Patient remains in sinus tachycardia. Her QT is normal, but due to tachycardia, her QTc is prolonged.    Review Of Systems:     Unable to assess review of systems due to dementia with delirium      Medications:  acetaminophen  IVPB .. 1000 milliGRAM(s) IV Intermittent every 8 hours PRN  ALBUTerol/ipratropium for Nebulization 3 milliLiter(s) Nebulizer every 6 hours  buDESOnide   0.5 milliGRAM(s) Respule 0.5 milliGRAM(s) Inhalation every 12 hours  cefepime   IVPB      chlorhexidine 4% Liquid 1 Application(s) Topical <User Schedule>  clonazePAM Tablet 2 milliGRAM(s) Oral <User Schedule>  dextrose 40% Gel 15 Gram(s) Oral once PRN  dextrose 5%. 1000 milliLiter(s) IV Continuous <Continuous>  dextrose 50% Injectable 12.5 Gram(s) IV Push once  dextrose 50% Injectable 25 Gram(s) IV Push once  dextrose 50% Injectable 25 Gram(s) IV Push once  enoxaparin Injectable 40 milliGRAM(s) SubCutaneous daily  ergocalciferol 42791 Unit(s) Oral every week  glucagon  Injectable 1 milliGRAM(s) IntraMuscular once PRN  insulin lispro (HumaLOG) corrective regimen sliding scale   SubCutaneous every 6 hours  insulin NPH human recombinant 7 Unit(s) SubCutaneous <User Schedule>  lidocaine   Patch 1 Patch Transdermal every 24 hours  mirtazapine 15 milliGRAM(s) Oral <User Schedule>  morphine  - Injectable 1 milliGRAM(s) IV Push every 6 hours PRN  nystatin    Suspension 586300 Unit(s) Oral four times a day  nystatin Powder 1 Application(s) Topical two times a day PRN  oxyCODONE    IR 10 milliGRAM(s) Oral every 8 hours PRN  pantoprazole  Injectable 40 milliGRAM(s) IV Push every 24 hours  predniSONE   Tablet 7.5 milliGRAM(s) Oral <User Schedule>  sertraline 50 milliGRAM(s) Oral daily  vancomycin  IVPB 500 milliGRAM(s) IV Intermittent every 24 hours    PAST MEDICAL & SURGICAL HISTORY:  CVA (cerebral vascular accident)  Fatty pancreas  PNA (pneumonia)  Pulmonary HTN  IGT (impaired glucose tolerance)  Ulcerative colitis  Acid reflux  Anxiety  Depression  Mouth sores  HLD (hyperlipidemia)  Asthma  Humeral head fracture  H/O: hysterectomy  H/O cataract extraction, left  History of knee replacement    Vitals:  T(C): 36.9 (09-13-18 @ 15:18), Max: 38.7 (09-13-18 @ 00:15)  HR: 102 (09-13-18 @ 15:18) (102 - 112)  BP: 124/77 (09-13-18 @ 15:18) (123/73 - 150/81)  BP(mean): --  RR: 18 (09-13-18 @ 15:18) (18 - 18)  SpO2: 94% (09-13-18 @ 15:18) (91% - 94%)  Wt(kg): --  Daily     Daily   I&O's Summary    12 Sep 2018 07:01  -  13 Sep 2018 07:00  --------------------------------------------------------  IN: 1110 mL / OUT: 1000 mL / NET: 110 mL    13 Sep 2018 07:01  -  13 Sep 2018 19:38  --------------------------------------------------------  IN: 0 mL / OUT: 250 mL / NET: -250 mL        Physical Exam:  Appearance: Normal, well groomed, NAD  Eyes: PERRLA, EOMI, pink conjunctiva, no scleral icterus   HENT: Normal oral mucosa  Cardiovascular: RRR, S1, S2, no murmur, rub, or gallop; no edema; no JVD; PICC in LUE  Respiratory: Clear to auscultation bilaterally  Gastrointestinal: Soft, non-tender, non-distended, BS+  Musculoskeletal: No clubbing or joint deformity   Neurologic: No focal weakness  Lymphatic: No lymphadenopathy  Psychiatry: awake and alert  Skin: No rashes, ecchymoses, or cyanosis                            7.8    12.87 )-----------( 353      ( 13 Sep 2018 08:06 )             24.4     09-13    140  |  100  |  39<H>  ----------------------------<  201<H>  4.5   |  28  |  0.64    Ca    8.8      13 Sep 2018 06:41  Mg     2.0     09-13        ECG: sinus tachycardia with normal QT    Echo: < from: Transesophageal Echocardiogram (07.30.18 @ 16:42) >  ------------------------------------------------------------------------  Conclusions:  1. Tethered , mildly calcified mitral valve leaflets with  normal opening. Mild-moderate mitral regurgitation.  2. Sclerotic aortic valve leaflets with normal opening.  Mild aortic regurgitation.  3. Severe global left ventricular systolic dysfunction.  4. Right ventricular enlargement with decreased right  ventricular systolic function.  5. Thickened tricuspid valve. Mild-moderate tricuspid  regurgitation.  6. Color Doppler demonstrates evidence of a patent foramen  ovale.  7. No evidence of valvular vegetation is seen.  ------------------------------------------------------------------------  Confirmed on  8/1/2018 - 15:06:58 by Margaret Khan M.D.  ------------------------------------------------------------------------    < end of copied text >    < from: TTE with Doppler (w/Cont) (08.17.18 @ 20:12) >  ------------------------------------------------------------------------  Conclusions:  1. Endocardial visualization enhanced with intravenous  injection of Ultrasonic Enhancing Agent (Definity). Left  ventricle suboptimally visualized despite the use of  intravenous echo contrast; overall normal left ventricular  systolic function. The mid inferoseptum appears hypokinetic  on certain views.  2. The right ventricle is notwell visualized; grossly  normal right ventricular size and systolic function.  *** Compared with echocardiogram of 7/30/2018, there has  been improvement of LV and RV systolic function.  ------------------------------------------------------------------------  Confirmed on  8/18/2018 - 12:38:38 by Belkys Altman M.D.  ------------------------------------------------------------------------    < end of copied text >

## 2018-09-13 NOTE — PROGRESS NOTE ADULT - ASSESSMENT
68 year old female w/hyperglycemia (chronic steroid use) on continuous tube feeds here w/weakness and MRSA bacteremia w/wound vac. BG values mostly at goal on present insulin regimen w/6am elevated after receiving prednisone dose at midnight. Will slowly titrate up to improve glycemic control. Likely will not require standing insulin once if able to transition off continuous tube feeds. BG goal (100-200mg/dl)

## 2018-09-13 NOTE — PROGRESS NOTE ADULT - SUBJECTIVE AND OBJECTIVE BOX
---___---___---___---___---___---___ ---___---___---___---___---___---___---___---___---___---                    <<<  M E D I C A L   A T T E N D I N G    F O L L O W    U P   N O T E  >>>    remains unchanged also has a fever last night id to adjust medications      ---___---___---___---___---___---      <<<  MEDICATIONS:  >>>    MEDICATIONS  (STANDING):  ALBUTerol/ipratropium for Nebulization 3 milliLiter(s) Nebulizer every 6 hours  buDESOnide   0.5 milliGRAM(s) Respule 0.5 milliGRAM(s) Inhalation every 12 hours  cefepime   IVPB      chlorhexidine 4% Liquid 1 Application(s) Topical <User Schedule>  clonazePAM Tablet 2 milliGRAM(s) Oral <User Schedule>  dextrose 5%. 1000 milliLiter(s) (50 mL/Hr) IV Continuous <Continuous>  dextrose 50% Injectable 12.5 Gram(s) IV Push once  dextrose 50% Injectable 25 Gram(s) IV Push once  dextrose 50% Injectable 25 Gram(s) IV Push once  enoxaparin Injectable 40 milliGRAM(s) SubCutaneous daily  ergocalciferol 95264 Unit(s) Oral every week  insulin lispro (HumaLOG) corrective regimen sliding scale   SubCutaneous every 6 hours  insulin NPH human recombinant 6 Unit(s) SubCutaneous <User Schedule>  lidocaine   Patch 1 Patch Transdermal every 24 hours  mirtazapine 15 milliGRAM(s) Oral <User Schedule>  nystatin    Suspension 398957 Unit(s) Oral four times a day  pantoprazole  Injectable 40 milliGRAM(s) IV Push every 24 hours  predniSONE   Tablet 7.5 milliGRAM(s) Oral <User Schedule>  sertraline 50 milliGRAM(s) Oral daily  vancomycin  IVPB 500 milliGRAM(s) IV Intermittent every 24 hours      MEDICATIONS  (PRN):  acetaminophen  IVPB .. 1000 milliGRAM(s) IV Intermittent every 8 hours PRN Mild Pain (1 - 3), Moderate Pain (4 - 6), Severe Pain (7 - 10)  dextrose 40% Gel 15 Gram(s) Oral once PRN Blood Glucose LESS THAN 70 milliGRAM(s)/deciLiter  glucagon  Injectable 1 milliGRAM(s) IntraMuscular once PRN Glucose <70 milliGRAM(s)/deciLiter  morphine  - Injectable 1 milliGRAM(s) IV Push every 6 hours PRN breakthrough pain  nystatin Powder 1 Application(s) Topical two times a day PRN rash/itchiness  oxyCODONE    IR 10 milliGRAM(s) Oral every 8 hours PRN Severe Pain (7 - 10)       ---___---___---___---___---___---     <<<REVIEW OF SYSTEM: >>>    GEN: no fever, no chills, no pain  RESP: no SOB, no cough, no sputum  CVS: no chest pain, no palpitations, no edema  GI: no abdominal pain, no nausea, no vomiting, no constipation, no diarrhea  : no dysurea, no frequency  NEURO: no headache, no dizziness  PSYCH: no depression, not anxious  Derm : no itching, no rash     ---___---___---___---___---___---          <<<  VITAL SIGNS: >>>    T(F): 99.8 (18 @ 06:09), Max: 101.7 (18 @ 00:15)  HR: 107 (18 @ 06:09) (107 - 119)  BP: 123/73 (18 @ 06:09) (123/73 - 150/81)  RR: 18 (18 @ 06:09) (18 - 18)  SpO2: 92% (18 @ 06:09) (91% - 94%)  Wt(kg): --  CAPILLARY BLOOD GLUCOSE      POCT Blood Glucose.: 156 mg/dL (13 Sep 2018 12:09)    I&O's Summary    12 Sep 2018 07:01  -  13 Sep 2018 07:00  --------------------------------------------------------  IN: 1110 mL / OUT: 1000 mL / NET: 110 mL         ---___---___---___---___---___---                       PHYSICAL EXAM:    GEN: A&O X 2 , NAD , comfortable  HEENT: NCAT, PERRL, MMM, no scleral icterus, hearing intact  NECK: Supple, No JVD  CVS: S1S2 , regular , No M/R/G appreciated  PULM: CTA B/L,  no W/R/R appreciated  ABD.: soft. non tender, non distended,  bowel sounds present peg noted   Extrem: intact pulses , no edema noted  Derm: wound healing on te right side   PSYCH: normal mood, no depression,  anxious     ---___---___---___---___---___---     <<<  LAB AND IMAGING: >>>                          7.8    12.87 )-----------( 353      ( 13 Sep 2018 08:06 )             24.4               09-13    140  |  100  |  39<H>  ----------------------------<  201<H>  4.5   |  28  |  0.64    Ca    8.8      13 Sep 2018 06:41  Mg     2.0     09-13             Urinalysis Basic - ( 13 Sep 2018 08:03 )    Color: Yellow / Appearance: Clear / S.020 / pH: x  Gluc: x / Ketone: Negative  / Bili: Negative / Urobili: Negative mg/dL   Blood: x / Protein: 100 mg/dL / Nitrite: Negative   Leuk Esterase: Moderate / RBC: 334 /HPF / WBC 37 /HPF   Sq Epi: x / Non Sq Epi: 15 /HPF / Bacteria: Few                        [All pertinent / recent available Imaging reports and other labs reviewed]     ---___---___---___---___---___---___ ---___---___---___---___---           <<<  A S S E S S M E N T   A N D   P L A N :  >>>          -GI/DVT Prophylaxis.    --------------------------------------------  Case discussed with  kendra   Education given on     >>______________________<<      Deniz Bolden .         phone   [phone]

## 2018-09-13 NOTE — PROGRESS NOTE ADULT - ASSESSMENT
68 year-old woman with Alzheimer's dementia seen to have episode of periprocedural anxiety, shortness of breath, and chest discomfort that resolved spontaneously and has not recurred.  Her most recent TTE shows interval improvement in LV systolic function.    QT is normal (QTc prolonged due to sinus tachycardia).  Risk/benefit in favor of giving Seroquel if recommended by psychiatry for behavioral disturbance.    Discharge planning to rehab per primary team.  Please reconsult for any further cardiovascular concerns. 68 year-old woman with Alzheimer's dementia seen to have episode of periprocedural anxiety, shortness of breath, and chest discomfort that resolved spontaneously and has not recurred.  Her most recent TTE shows interval improvement in LV systolic function.    QT is normal (QTc prolonged due to sinus tachycardia).  Risk/benefit in favor of giving Seroquel if recommended by psychiatry for behavioral disturbance, however, would recommend waiting until sinus tachycardia improves as patient is on multiple QT prolonging agents (antibiotics and SSRI).  Would wait for sinus tach to improve with hydration, feeding. Then reassess QTc.    Discharge planning to rehab per primary team.  Please reconsult for any further cardiovascular concerns.

## 2018-09-13 NOTE — CHART NOTE - NSCHARTNOTEFT_GEN_A_CORE
Consult for tube feeding from BENJI Mckeon. Patient transferred to [address] [place] [address] from ICU.    Chart reviewed events noted. ·  Problem: Hip fracture, left, sequela.  Plan: supportive care and  pain medication as needed,  MRSA sepsis, dysphonia, anxiety agitation, COPD on long term steroids, hyperglycemia steroid induced, wound dehiscence R leg with vac.    Source: Patient [ ]    Family [ ]     other [ ]          Patient reports [ ] nausea  [ ] vomiting [ ] diarrhea [ ] constipation  [ ]chewing problems [ ] swallowing issues  [ ] other:      PO intake:  < 50% [ ] 50-75% [ ]   % [ ]  other :     Source for PO intake [ ] Patient [ ] family [ ] chart [ ] staff [ ] other     Enteral  Nutrition: Pivot @35ml/hr x24 hrs  plus wendi x2 daily     Current Weight: 50.4kg  Dosing Weight same  Pertinent Medications: MEDICATIONS  (STANDING):  ALBUTerol/ipratropium for Nebulization 3 milliLiter(s) Nebulizer every 6 hours  buDESOnide   0.5 milliGRAM(s) Respule 0.5 milliGRAM(s) Inhalation every 12 hours  cefepime   IVPB      cefepime   IVPB 1000 milliGRAM(s) IV Intermittent once  chlorhexidine 4% Liquid 1 Application(s) Topical <User Schedule>  clonazePAM Tablet 2 milliGRAM(s) Oral <User Schedule>  dextrose 5%. 1000 milliLiter(s) (50 mL/Hr) IV Continuous <Continuous>  dextrose 50% Injectable 12.5 Gram(s) IV Push once  dextrose 50% Injectable 25 Gram(s) IV Push once  dextrose 50% Injectable 25 Gram(s) IV Push once  enoxaparin Injectable 40 milliGRAM(s) SubCutaneous daily  ergocalciferol 12168 Unit(s) Oral every week  insulin lispro (HumaLOG) corrective regimen sliding scale   SubCutaneous every 6 hours  insulin NPH human recombinant 6 Unit(s) SubCutaneous <User Schedule>  lidocaine   Patch 1 Patch Transdermal every 24 hours  mirtazapine 15 milliGRAM(s) Oral <User Schedule>  nystatin    Suspension [id number] Unit(s) Oral four times a day  pantoprazole  Injectable 40 milliGRAM(s) IV Push every 24 hours  predniSONE   Tablet 7.5 milliGRAM(s) Oral <User Schedule>  sertraline 50 milliGRAM(s) Oral daily  vancomycin  IVPB 500 milliGRAM(s) IV Intermittent every 24 hours    MEDICATIONS  (PRN):  acetaminophen  IVPB .. 1000 milliGRAM(s) IV Intermittent every 8 hours PRN Mild Pain (1 - 3), Moderate Pain (4 - 6), Severe Pain (7 - 10)  dextrose 40% Gel 15 Gram(s) Oral once PRN Blood Glucose LESS THAN 70 milliGRAM(s)/deciLiter  glucagon  Injectable 1 milliGRAM(s) IntraMuscular once PRN Glucose <70 milliGRAM(s)/deciLiter  morphine  - Injectable 1 milliGRAM(s) IV Push every 6 hours PRN breakthrough pain  nystatin Powder 1 Application(s) Topical two times a day PRN rash/itchiness  oxyCODONE    IR 10 milliGRAM(s) Oral every 8 hours PRN Severe Pain (7 - 10)    Pertinent Labs:  09-13 Na140 mmol/L Glu 201 mg/dL<H> K+ 4.5 mmol/L Cr  0.64 mg/dL BUN 39 mg/dL<H> 09-07 Phos 3.1 mg/dL      Skin:  maryjane heel DTI, R leg wound vac    Estimated Needs:   [X ]b no change since previous assessment  [ ] recalculated:       Previous Nutrition Diagnosis:  [X ] Malnutrition          Nutrition Diagnosis is [X ] ongoing  [ ] resolved [ ] not applicable          New Nutrition Diagnosis: [X ] not applicable    [    Recommend    .      [ ] Other:        Monitoring and Evaluation:     [ ] PO intake [ ] Tolerance to diet prescription [ ] weights [ ] follow up per protocol    [ ] other: Consult for tube feeding from BENJI Mckeon. Patient transferred to [address] [place] [address] from ICU.    Chart reviewed events noted.  Problem: Hip fracture, left, sequela.  Plan: supportive care and  pain medication as needed.   MRSA sepsis, dysphonia, anxiety agitation, COPD on long term steroids, hyperglycemia steroid induced, wound dehiscence R leg with vac.    Source: Patient [X ]    Family [X ]            Patient reports:  waiting for swallow test, MBS planned for Monday      PO intake:  < 50% [ ] 50-75% [ ]   % [ ]  other :     Source for PO intake [ ] Patient [ ] family [ ] chart [ ] staff [ ] other     Enteral  Nutrition: Pivot @35ml/hr x24 hrs  plus wendi x2 daily     Current Weight: 50.4kg  Dosing Weight same  Pertinent Medications: MEDICATIONS  (STANDING):  ALBUTerol/ipratropium for Nebulization 3 milliLiter(s) Nebulizer every 6 hours  buDESOnide   0.5 milliGRAM(s) Respule 0.5 milliGRAM(s) Inhalation every 12 hours  cefepime   IVPB      cefepime   IVPB 1000 milliGRAM(s) IV Intermittent once  chlorhexidine 4% Liquid 1 Application(s) Topical <User Schedule>  clonazePAM Tablet 2 milliGRAM(s) Oral <User Schedule>  dextrose 5%. 1000 milliLiter(s) (50 mL/Hr) IV Continuous <Continuous>  dextrose 50% Injectable 12.5 Gram(s) IV Push once  dextrose 50% Injectable 25 Gram(s) IV Push once  dextrose 50% Injectable 25 Gram(s) IV Push once  enoxaparin Injectable 40 milliGRAM(s) SubCutaneous daily  ergocalciferol 42411 Unit(s) Oral every week  insulin lispro (HumaLOG) corrective regimen sliding scale   SubCutaneous every 6 hours  insulin NPH human recombinant 6 Unit(s) SubCutaneous <User Schedule>  lidocaine   Patch 1 Patch Transdermal every 24 hours  mirtazapine 15 milliGRAM(s) Oral <User Schedule>  nystatin    Suspension [id number] Unit(s) Oral four times a day  pantoprazole  Injectable 40 milliGRAM(s) IV Push every 24 hours  predniSONE   Tablet 7.5 milliGRAM(s) Oral <User Schedule>  sertraline 50 milliGRAM(s) Oral daily  vancomycin  IVPB 500 milliGRAM(s) IV Intermittent every 24 hours    MEDICATIONS  (PRN):  acetaminophen  IVPB .. 1000 milliGRAM(s) IV Intermittent every 8 hours PRN Mild Pain (1 - 3), Moderate Pain (4 - 6), Severe Pain (7 - 10)  dextrose 40% Gel 15 Gram(s) Oral once PRN Blood Glucose LESS THAN 70 milliGRAM(s)/deciLiter  glucagon  Injectable 1 milliGRAM(s) IntraMuscular once PRN Glucose <70 milliGRAM(s)/deciLiter  morphine  - Injectable 1 milliGRAM(s) IV Push every 6 hours PRN breakthrough pain  nystatin Powder 1 Application(s) Topical two times a day PRN rash/itchiness  oxyCODONE    IR 10 milliGRAM(s) Oral every 8 hours PRN Severe Pain (7 - 10)    Pertinent Labs:  09-13 Na140 mmol/L Glu 201 mg/dL<H> K+ 4.5 mmol/L Cr  0.64 mg/dL BUN 39 mg/dL<H> 09-07 Phos 3.1 mg/dL      Skin:  maryjane heel DTI, R leg wound vac, sacral decubiti st II    Estimated Needs:   [X ] no change since previous assessment        Previous Nutrition Diagnosis:  [X ] Malnutrition          Nutrition Diagnosis is [X ] ongoing  [ ] resolved [ ] not applicable          New Nutrition Diagnosis: [X ] not applicable    [    Recommend    . Other:        Monitoring and Evaluation:     [ ] PO intake [ ] Tolerance to diet prescription [ ] weights [ ] follow up per protocol    [ ] other: Consult for tube feeding from BENJI Mckeon. Patient transferred to [address] [place] [address] from ICU.    Chart reviewed events noted.  Problem: Hip fracture, left, sequela.  Plan: supportive care and  pain medication as needed.   MRSA sepsis, dysphonia, anxiety agitation, COPD on long term steroids, hyperglycemia steroid induced, wound dehiscence R leg with vac.    Source: Patient [X ]    Family [X ]            Patient reports:  waiting for swallow test, [facility] planned for Monday         Enteral  Nutrition: Pivot @35ml/hr x24 hrs  plus iron x2 daily     Current Weight: 50.4kg  Dosing Weight same  Pertinent Medications: MEDICATIONS  (STANDING):  ALBUTerol/ipratropium for Nebulization 3 milliLiter(s) Nebulizer every 6 hours  buDESOnide   0.5 milliGRAM(s) Respule 0.5 milliGRAM(s) Inhalation every 12 hours  cefepime   IVPB      cefepime   IVPB 1000 milliGRAM(s) IV Intermittent once  chlorhexidine 4% Liquid 1 Application(s) Topical <User Schedule>  clonazePAM Tablet 2 milliGRAM(s) Oral <User Schedule>  dextrose 5%. 1000 milliLiter(s) (50 mL/Hr) IV Continuous <Continuous>  dextrose 50% Injectable 12.5 Gram(s) IV Push once  dextrose 50% Injectable 25 Gram(s) IV Push once  dextrose 50% Injectable 25 Gram(s) IV Push once  enoxaparin Injectable 40 milliGRAM(s) SubCutaneous daily  ergocalciferol 65012 Unit(s) Oral every week  insulin lispro (HumaLOG) corrective regimen sliding scale   SubCutaneous every 6 hours  insulin NPH human recombinant 6 Unit(s) SubCutaneous <User Schedule>  lidocaine   Patch 1 Patch Transdermal every 24 hours  mirtazapine 15 milliGRAM(s) Oral <User Schedule>  nystatin    Suspension 269486 Unit(s) Oral four times a day  pantoprazole  Injectable 40 milliGRAM(s) IV Push every 24 hours  predniSONE   Tablet 7.5 milliGRAM(s) Oral <User Schedule>  sertraline 50 milliGRAM(s) Oral daily  vancomycin  IVPB 500 milliGRAM(s) IV Intermittent every 24 hours    MEDICATIONS  (PRN):  acetaminophen  IVPB .. 1000 milliGRAM(s) IV Intermittent every 8 hours PRN Mild Pain (1 - 3), Moderate Pain (4 - 6), Severe Pain (7 - 10)  dextrose 40% Gel 15 Gram(s) Oral once PRN Blood Glucose LESS THAN 70 milliGRAM(s)/deciLiter  glucagon  Injectable 1 milliGRAM(s) IntraMuscular once PRN Glucose <70 milliGRAM(s)/deciLiter  morphine  - Injectable 1 milliGRAM(s) IV Push every 6 hours PRN breakthrough pain  nystatin Powder 1 Application(s) Topical two times a day PRN rash/itchiness  oxyCODONE    IR 10 milliGRAM(s) Oral every 8 hours PRN Severe Pain (7 - 10)    Pertinent Labs:  09-13 Na140 mmol/L Glu 201 mg/dL<H> K+ 4.5 mmol/L Cr  0.64 mg/dL BUN 39 mg/dL<H> 09-07 Phos 3.1 mg/dL      Skin:  maryjane heel DTI, R leg wound vac, sacral decubiti st II    Estimated Needs:   [X ] no change since previous assessment    Previous Nutrition Diagnosis:  [X ] Malnutrition        Nutrition Diagnosis is [X ] ongoing  [ ] resolved [ ] not applicable      New Nutrition Diagnosis: [X ] not applicable    Enteral Nutrition support:    Pivot 1.5 @ 35 ml/hr x 24 hrs  via PEG total 840ml, 1260 kcal ,25 kcal/Kg, 79 grams protein,1.6 grams/Kg, based on dosing weight 50.4kg. free water 638ml   Continue Iron x 2 packets daily via PEG to promote wound healing    Free water 638 ml free water in formula plus 8oz each Iron= Total free water ~1100ml , 22ml/kg based on 50.4kg.           Monitoring and Evaluation:     [X] Follow up MBS with diet recommendations [X ] Tolerance to tube feeding [X ] weights [X ] follow up per protocol    [ ] other:

## 2018-09-13 NOTE — PROGRESS NOTE ADULT - SUBJECTIVE AND OBJECTIVE BOX
Events noted. No agitation. Off Seroquel due to prolonged QTc.  says she slept well last night with Remeron 15mg. Still with pain.       Vital Signs Last 24 Hrs  T(C): 36.9 (13 Sep 2018 15:18), Max: 38.7 (13 Sep 2018 00:15)  T(F): 98.5 (13 Sep 2018 15:18), Max: 101.7 (13 Sep 2018 00:15)  HR: 102 (13 Sep 2018 15:18) (102 - 112)  BP: 124/77 (13 Sep 2018 15:18) (123/73 - 150/81)  BP(mean): --  RR: 18 (13 Sep 2018 15:18) (18 - 18)  SpO2: 94% (13 Sep 2018 15:18) (91% - 94%)                          7.8    12.87 )-----------( 353      ( 13 Sep 2018 08:06 )             24.4       09-13    140  |  100  |  39<H>  ----------------------------<  201<H>  4.5   |  28  |  0.64    Ca    8.8      13 Sep 2018 06:41  Mg     2.0     09-13                MEDICATIONS  (STANDING):  ALBUTerol/ipratropium for Nebulization 3 milliLiter(s) Nebulizer every 6 hours  buDESOnide   0.5 milliGRAM(s) Respule 0.5 milliGRAM(s) Inhalation every 12 hours  cefepime   IVPB      cefepime   IVPB 1000 milliGRAM(s) IV Intermittent once  chlorhexidine 4% Liquid 1 Application(s) Topical <User Schedule>  clonazePAM Tablet 2 milliGRAM(s) Oral <User Schedule>  dextrose 5%. 1000 milliLiter(s) (50 mL/Hr) IV Continuous <Continuous>  dextrose 50% Injectable 12.5 Gram(s) IV Push once  dextrose 50% Injectable 25 Gram(s) IV Push once  dextrose 50% Injectable 25 Gram(s) IV Push once  enoxaparin Injectable 40 milliGRAM(s) SubCutaneous daily  ergocalciferol 37658 Unit(s) Oral every week  insulin lispro (HumaLOG) corrective regimen sliding scale   SubCutaneous every 6 hours  insulin NPH human recombinant 7 Unit(s) SubCutaneous <User Schedule>  lidocaine   Patch 1 Patch Transdermal every 24 hours  mirtazapine 15 milliGRAM(s) Oral <User Schedule>  nystatin    Suspension 344455 Unit(s) Oral four times a day  pantoprazole  Injectable 40 milliGRAM(s) IV Push every 24 hours  predniSONE   Tablet 7.5 milliGRAM(s) Oral <User Schedule>  sertraline 50 milliGRAM(s) Oral daily  vancomycin  IVPB 500 milliGRAM(s) IV Intermittent every 24 hours    MEDICATIONS  (PRN):  acetaminophen  IVPB .. 1000 milliGRAM(s) IV Intermittent every 8 hours PRN Mild Pain (1 - 3), Moderate Pain (4 - 6), Severe Pain (7 - 10)  dextrose 40% Gel 15 Gram(s) Oral once PRN Blood Glucose LESS THAN 70 milliGRAM(s)/deciLiter  glucagon  Injectable 1 milliGRAM(s) IntraMuscular once PRN Glucose <70 milliGRAM(s)/deciLiter  morphine  - Injectable 1 milliGRAM(s) IV Push every 6 hours PRN breakthrough pain  nystatin Powder 1 Application(s) Topical two times a day PRN rash/itchiness  oxyCODONE    IR 10 milliGRAM(s) Oral every 8 hours PRN Severe Pain (7 - 10)      Elderly WF in bed, calm, cooperative, alert and oriented x 1 .  No psychomotor abnormalities. Insight and judgment are impaired.  Speech is minimal and gives one word responses in a low  volume. No hallucinations nor delusions. The patient denied suicidal and homicidal ideation and plan. Mood is euthymic and affect constricted. Attention and concentration, short term memory, and long term memory impaired.

## 2018-09-13 NOTE — PROGRESS NOTE ADULT - SUBJECTIVE AND OBJECTIVE BOX
CC: f/u for mrsa bacteremia and prosthetic hip infection    Patient reports her left hip hurts    REVIEW OF SYSTEMS:  All other review of systems negative (Comprehensive ROS)    Antimicrobials Day #  :  nystatin    Suspension 575857 Unit(s) Oral four times a day  vancomycin  IVPB 500 milliGRAM(s) IV Intermittent every 24 hours    Other Medications Reviewed    T(F): 99.8 (18 @ 06:09), Max: 101.7 (18 @ 00:15)  HR: 107 (18 @ 06:09)  BP: 123/73 (18 @ 06:09)  RR: 18 (18 @ 06:09)  SpO2: 92% (18 @ 06:09)  Wt(kg): --    PHYSICAL EXAM:  General: lethargic in acute distress  Eyes:  anicteric, no conjunctival injection, no discharge  Oropharynx: no lesions or injection 	  Neck: supple, without adenopathy  Lungs: bronchial bs right to auscultation  Heart: regular rate and rhythm; no murmur, rubs or gallops  Abdomen: soft, nondistended, nontender, without mass or organomegaly, peg ok  Skin: no lesions  Extremities: no clubbing, cyanosis,. right hip wound with vac and clean  Neurologic: lethargic, slumps to right    LAB RESULTS:                        7.8    12.87 )-----------( 353      ( 13 Sep 2018 08:06 )             24.4         140  |  100  |  39<H>  ----------------------------<  201<H>  4.5   |  28  |  0.64    Ca    8.8      13 Sep 2018 06:41  Mg     2.0             Urinalysis Basic - ( 13 Sep 2018 08:03 )    Color: Yellow / Appearance: Clear / S.020 / pH: x  Gluc: x / Ketone: Negative  / Bili: Negative / Urobili: Negative mg/dL   Blood: x / Protein: 100 mg/dL / Nitrite: Negative   Leuk Esterase: Moderate / RBC: 334 /HPF / WBC 37 /HPF   Sq Epi: x / Non Sq Epi: 15 /HPF / Bacteria: Few      MICROBIOLOGY:  RECENT CULTURES:   @ 15:06 .Urine Catheterized     No growth       @ 09:24 .Blood Blood     No growth to date.       @ 00:48 .Urine Catheterized     No growth          RADIOLOGY REVIEWED:    < from: Xray Chest 1 View- PORTABLE-Routine (18 @ 18:35) >  IMPRESSION:     Left-sided PICC line in place with its distal tip overlying the SVC.   Moderate pulmonary edema.   Bilateral pleural effusions and associated atelectasis.      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 Wound looks ok but has escalating fever i suspect from a pulmonary source given infiltrates and right bronchial bs. Urine culture and ua negative, blood cultures neg so uti and picc infection not apparent. No diarrhea to suggest cdif. Oxygenates ok so thromboembolic not apparent.   Plan:  repeat cultures  2 more days of iv vanco and then extended po minocycline or doxycycline  start cefepime for pneumonia

## 2018-09-13 NOTE — PROGRESS NOTE ADULT - SUBJECTIVE AND OBJECTIVE BOX
MYRIAM WHITE:[id number],   68yFemale followed for:  ASA; dye contrast (Anaphylaxis)  aspirin (Short breath)  divalproex sodium (Other (Unknown))  Haldol (Other (Unknown))  penicillin (Short breath; Rash)  sulfa drugs (Short breath; Rash)  Xanax (Other (Unknown))    PAST MEDICAL & SURGICAL HISTORY:  CVA (cerebral vascular accident)  Fatty pancreas  PNA (pneumonia)  Pulmonary HTN  IGT (impaired glucose tolerance)  Ulcerative colitis  Acid reflux  Anxiety  Depression  Mouth sores  HLD (hyperlipidemia)  Asthma  Humeral head fracture  H/O: hysterectomy  H/O cataract extraction, left  History of knee replacement    FAMILY HISTORY:  Family history of dementia (Grandparent)  Family history of colon cancer (Grandparent)    MEDICATIONS  (STANDING):  ALBUTerol/ipratropium for Nebulization 3 milliLiter(s) Nebulizer every 6 hours  buDESOnide   0.5 milliGRAM(s) Respule 0.5 milliGRAM(s) Inhalation every 12 hours  chlorhexidine 4% Liquid 1 Application(s) Topical <User Schedule>  clonazePAM Tablet 2 milliGRAM(s) Oral <User Schedule>  dextrose 5%. 1000 milliLiter(s) (50 mL/Hr) IV Continuous <Continuous>  dextrose 50% Injectable 12.5 Gram(s) IV Push once  dextrose 50% Injectable 25 Gram(s) IV Push once  dextrose 50% Injectable 25 Gram(s) IV Push once  enoxaparin Injectable 40 milliGRAM(s) SubCutaneous daily  ergocalciferol 85823 Unit(s) Oral every week  insulin lispro (HumaLOG) corrective regimen sliding scale   SubCutaneous every 6 hours  insulin NPH human recombinant 6 Unit(s) SubCutaneous <User Schedule>  lidocaine   Patch 1 Patch Transdermal every 24 hours  mirtazapine 15 milliGRAM(s) Oral <User Schedule>  nystatin    Suspension 028839 Unit(s) Oral four times a day  pantoprazole  Injectable 40 milliGRAM(s) IV Push every 24 hours  predniSONE   Tablet 7.5 milliGRAM(s) Oral <User Schedule>  sertraline 50 milliGRAM(s) Oral daily  vancomycin  IVPB 500 milliGRAM(s) IV Intermittent every 24 hours    MEDICATIONS  (PRN):  acetaminophen  IVPB .. 1000 milliGRAM(s) IV Intermittent every 8 hours PRN Mild Pain (1 - 3), Moderate Pain (4 - 6), Severe Pain (7 - 10)  dextrose 40% Gel 15 Gram(s) Oral once PRN Blood Glucose LESS THAN 70 milliGRAM(s)/deciLiter  glucagon  Injectable 1 milliGRAM(s) IntraMuscular once PRN Glucose <70 milliGRAM(s)/deciLiter  morphine  - Injectable 1 milliGRAM(s) IV Push every 6 hours PRN breakthrough pain  nystatin Powder 1 Application(s) Topical two times a day PRN rash/itchiness  oxyCODONE    IR 10 milliGRAM(s) Oral every 8 hours PRN Severe Pain (7 - 10)      Vital Signs Last 24 Hrs  T(C): 37.7 (13 Sep 2018 06:09), Max: 38.7 (13 Sep 2018 00:15)  T(F): 99.8 (13 Sep 2018 06:09), Max: 101.7 (13 Sep 2018 00:15)  HR: 107 (13 Sep 2018 06:09) (107 - 119)  BP: 123/73 (13 Sep 2018 06:09) (123/73 - 150/81)  BP(mean): --  RR: 18 (13 Sep 2018 06:09) (18 - 18)  SpO2: 92% (13 Sep 2018 06:09) (91% - 94%)  nc/at  s1s2  cta  soft, nt, nd no guarding or rebound  no c/c/e    CBC Full  -  ( 13 Sep 2018 08:06 )  WBC Count : 12.87 K/uL  Hemoglobin : 7.8 g/dL  Hematocrit : 24.4 %  Platelet Count - Automated : 353 K/uL  Mean Cell Volume : 94.2 fl  Mean Cell Hemoglobin : 30.1 pg  Mean Cell Hemoglobin Concentration : 32.0 gm/dL  Auto Neutrophil # : x  Auto Lymphocyte # : x  Auto Monocyte # : x  Auto Eosinophil # : x  Auto Basophil # : x  Auto Neutrophil % : x  Auto Lymphocyte % : x  Auto Monocyte % : x  Auto Eosinophil % : x  Auto Basophil % : x    09-13    140  |  100  |  39<H>  ----------------------------<  201<H>  4.5   |  28  |  0.64    Ca    8.8      13 Sep 2018 06:41  Mg     2.0     09-13

## 2018-09-13 NOTE — PROGRESS NOTE ADULT - SUBJECTIVE AND OBJECTIVE BOX
Diabetes Follow up note:  Interval Hx:  68 year old female w/steroid induced hyperglycemia on chronic prednisone on continuous tube feeds. BG values 100- low 200'smg/dl on present insulin regimen w/out hypoglycemia. Pt seen at bedside w/ present. Tolerating TF at goal. Reports wife slept better overnight. Plan for MBS on Monday so will remain on feeds until then.       Review of Systems:  General: "Hello". Unable to provide further ROS.     MEDS:    insulin lispro (HumaLOG) corrective regimen sliding scale   SubCutaneous every 6 hours  insulin NPH human recombinant 6 Unit(s) SubCutaneous <User Schedule>  predniSONE   Tablet 7.5 milliGRAM(s) Oral <User Schedule>    cefepime   IVPB      cefepime   IVPB 1000 milliGRAM(s) IV Intermittent once  nystatin    Suspension 778919 Unit(s) Oral four times a day  vancomycin  IVPB 500 milliGRAM(s) IV Intermittent every 24 hours    Allergies    ASA; dye contrast (Anaphylaxis)  aspirin (Short breath)  divalproex sodium (Other (Unknown))  Haldol (Other (Unknown))  penicillin (Short breath; Rash)  sulfa drugs (Short breath; Rash)  Xanax (Other (Unknown))        PE:  General: Female lying in bed. NAD.   Vital Signs Last 24 Hrs  T(C): 37.7 (13 Sep 2018 06:09), Max: 38.7 (13 Sep 2018 00:15)  T(F): 99.8 (13 Sep 2018 06:09), Max: 101.7 (13 Sep 2018 00:15)  HR: 107 (13 Sep 2018 06:09) (107 - 112)  BP: 123/73 (13 Sep 2018 06:09) (123/73 - 150/81)  BP(mean): --  RR: 18 (13 Sep 2018 06:09) (18 - 18)  SpO2: 92% (13 Sep 2018 06:09) (91% - 92%)  Abd: Soft, NT,ND, PEG in place.   Extremities: Warm. Wound vac in place L leg. Venodynes in place.   Neuro: A&O X1    LABS:    POCT Blood Glucose.: 156 mg/dL (09-13-18 @ 12:09)  POCT Blood Glucose.: 218 mg/dL (09-13-18 @ 06:06)  POCT Blood Glucose.: 176 mg/dL (09-13-18 @ 00:34)  POCT Blood Glucose.: 180 mg/dL (09-12-18 @ 17:44)  POCT Blood Glucose.: 139 mg/dL (09-12-18 @ 12:38)  POCT Blood Glucose.: 200 mg/dL (09-12-18 @ 05:50)  POCT Blood Glucose.: 171 mg/dL (09-11-18 @ 23:54)  POCT Blood Glucose.: 141 mg/dL (09-11-18 @ 18:07)  POCT Blood Glucose.: 128 mg/dL (09-11-18 @ 12:59)  POCT Blood Glucose.: 169 mg/dL (09-11-18 @ 06:36)  POCT Blood Glucose.: 172 mg/dL (09-11-18 @ 00:27)  POCT Blood Glucose.: 122 mg/dL (09-10-18 @ 18:21)                            7.8    12.87 )-----------( 353      ( 13 Sep 2018 08:06 )             24.4       09-13    140  |  100  |  39<H>  ----------------------------<  201<H>  4.5   |  28  |  0.64    Ca    8.8      13 Sep 2018 06:41  Mg     2.0     09-13          Hemoglobin A1C, Whole Blood: 5.5 % [4.0 - 5.6] (07-27-18 @ 09:02)            Contact number: darian [phone] or [phone]

## 2018-09-14 LAB
GLUCOSE BLDC GLUCOMTR-MCNC: 145 MG/DL — HIGH (ref 70–99)
GLUCOSE BLDC GLUCOMTR-MCNC: 176 MG/DL — HIGH (ref 70–99)
GLUCOSE BLDC GLUCOMTR-MCNC: 185 MG/DL — HIGH (ref 70–99)
GLUCOSE BLDC GLUCOMTR-MCNC: 200 MG/DL — HIGH (ref 70–99)
HCT VFR BLD CALC: 23.6 % — LOW (ref 34.5–45)
HGB BLD-MCNC: 7.3 G/DL — LOW (ref 11.5–15.5)
MCHC RBC-ENTMCNC: 29.8 PG — SIGNIFICANT CHANGE UP (ref 27–34)
MCHC RBC-ENTMCNC: 30.9 GM/DL — LOW (ref 32–36)
MCV RBC AUTO: 96.3 FL — SIGNIFICANT CHANGE UP (ref 80–100)
PLATELET # BLD AUTO: 355 K/UL — SIGNIFICANT CHANGE UP (ref 150–400)
RBC # BLD: 2.45 M/UL — LOW (ref 3.8–5.2)
RBC # FLD: 17.7 % — HIGH (ref 10.3–14.5)
WBC # BLD: 14.34 K/UL — HIGH (ref 3.8–10.5)
WBC # FLD AUTO: 14.34 K/UL — HIGH (ref 3.8–10.5)

## 2018-09-14 PROCEDURE — 99232 SBSQ HOSP IP/OBS MODERATE 35: CPT

## 2018-09-14 PROCEDURE — 99233 SBSQ HOSP IP/OBS HIGH 50: CPT

## 2018-09-14 RX ORDER — FENTANYL CITRATE 50 UG/ML
1 INJECTION INTRAVENOUS
Qty: 0 | Refills: 0 | Status: DISCONTINUED | OUTPATIENT
Start: 2018-09-14 | End: 2018-09-20

## 2018-09-14 RX ORDER — MORPHINE SULFATE 50 MG/1
1 CAPSULE, EXTENDED RELEASE ORAL EVERY 4 HOURS
Qty: 0 | Refills: 0 | Status: DISCONTINUED | OUTPATIENT
Start: 2018-09-14 | End: 2018-09-21

## 2018-09-14 RX ORDER — MORPHINE SULFATE 50 MG/1
1 CAPSULE, EXTENDED RELEASE ORAL ONCE
Qty: 0 | Refills: 0 | Status: DISCONTINUED | OUTPATIENT
Start: 2018-09-14 | End: 2018-09-14

## 2018-09-14 RX ORDER — ACETAMINOPHEN 500 MG
1000 TABLET ORAL EVERY 8 HOURS
Qty: 0 | Refills: 0 | Status: COMPLETED | OUTPATIENT
Start: 2018-09-14 | End: 2018-09-14

## 2018-09-14 RX ADMIN — Medication 500000 UNIT(S): at 17:34

## 2018-09-14 RX ADMIN — Medication 3 MILLILITER(S): at 12:10

## 2018-09-14 RX ADMIN — OXYCODONE HYDROCHLORIDE 10 MILLIGRAM(S): 5 TABLET ORAL at 06:30

## 2018-09-14 RX ADMIN — MORPHINE SULFATE 1 MILLIGRAM(S): 50 CAPSULE, EXTENDED RELEASE ORAL at 11:36

## 2018-09-14 RX ADMIN — LIDOCAINE 1 PATCH: 4 CREAM TOPICAL at 11:00

## 2018-09-14 RX ADMIN — Medication 500000 UNIT(S): at 12:10

## 2018-09-14 RX ADMIN — Medication 3 MILLILITER(S): at 22:52

## 2018-09-14 RX ADMIN — MORPHINE SULFATE 1 MILLIGRAM(S): 50 CAPSULE, EXTENDED RELEASE ORAL at 01:32

## 2018-09-14 RX ADMIN — Medication 2 MILLIGRAM(S): at 22:52

## 2018-09-14 RX ADMIN — Medication 0.5 MILLIGRAM(S): at 22:53

## 2018-09-14 RX ADMIN — FENTANYL CITRATE 1 PATCH: 50 INJECTION INTRAVENOUS at 17:34

## 2018-09-14 RX ADMIN — Medication 100 MILLIGRAM(S): at 09:18

## 2018-09-14 RX ADMIN — Medication 3 MILLILITER(S): at 06:08

## 2018-09-14 RX ADMIN — OXYCODONE HYDROCHLORIDE 10 MILLIGRAM(S): 5 TABLET ORAL at 06:08

## 2018-09-14 RX ADMIN — CEFEPIME 100 MILLIGRAM(S): 1 INJECTION, POWDER, FOR SOLUTION INTRAMUSCULAR; INTRAVENOUS at 17:34

## 2018-09-14 RX ADMIN — Medication 0.5 MILLIGRAM(S): at 13:07

## 2018-09-14 RX ADMIN — MIRTAZAPINE 15 MILLIGRAM(S): 45 TABLET, ORALLY DISINTEGRATING ORAL at 22:52

## 2018-09-14 RX ADMIN — Medication 3 MILLILITER(S): at 17:34

## 2018-09-14 RX ADMIN — MORPHINE SULFATE 1 MILLIGRAM(S): 50 CAPSULE, EXTENDED RELEASE ORAL at 16:04

## 2018-09-14 RX ADMIN — CHLORHEXIDINE GLUCONATE 1 APPLICATION(S): 213 SOLUTION TOPICAL at 06:15

## 2018-09-14 RX ADMIN — MORPHINE SULFATE 1 MILLIGRAM(S): 50 CAPSULE, EXTENDED RELEASE ORAL at 01:02

## 2018-09-14 RX ADMIN — Medication 400 MILLIGRAM(S): at 01:52

## 2018-09-14 RX ADMIN — Medication 1: at 20:18

## 2018-09-14 RX ADMIN — MORPHINE SULFATE 1 MILLIGRAM(S): 50 CAPSULE, EXTENDED RELEASE ORAL at 20:45

## 2018-09-14 RX ADMIN — CEFEPIME 100 MILLIGRAM(S): 1 INJECTION, POWDER, FOR SOLUTION INTRAMUSCULAR; INTRAVENOUS at 06:45

## 2018-09-14 RX ADMIN — MORPHINE SULFATE 1 MILLIGRAM(S): 50 CAPSULE, EXTENDED RELEASE ORAL at 20:17

## 2018-09-14 RX ADMIN — Medication 1: at 06:19

## 2018-09-14 RX ADMIN — PANTOPRAZOLE SODIUM 40 MILLIGRAM(S): 20 TABLET, DELAYED RELEASE ORAL at 12:10

## 2018-09-14 RX ADMIN — ENOXAPARIN SODIUM 40 MILLIGRAM(S): 100 INJECTION SUBCUTANEOUS at 17:34

## 2018-09-14 RX ADMIN — OXYCODONE HYDROCHLORIDE 10 MILLIGRAM(S): 5 TABLET ORAL at 14:06

## 2018-09-14 RX ADMIN — MORPHINE SULFATE 1 MILLIGRAM(S): 50 CAPSULE, EXTENDED RELEASE ORAL at 11:21

## 2018-09-14 RX ADMIN — SERTRALINE 50 MILLIGRAM(S): 25 TABLET, FILM COATED ORAL at 12:10

## 2018-09-14 RX ADMIN — LIDOCAINE 1 PATCH: 4 CREAM TOPICAL at 22:52

## 2018-09-14 RX ADMIN — Medication 500000 UNIT(S): at 06:08

## 2018-09-14 RX ADMIN — MORPHINE SULFATE 1 MILLIGRAM(S): 50 CAPSULE, EXTENDED RELEASE ORAL at 16:24

## 2018-09-14 RX ADMIN — OXYCODONE HYDROCHLORIDE 10 MILLIGRAM(S): 5 TABLET ORAL at 13:36

## 2018-09-14 NOTE — PHYSICAL THERAPY INITIAL EVALUATION ADULT - IMPAIRMENTS FOUND, PT EVAL
aerobic capacity/endurance/gait, locomotion, and balance
integumentary integrity
gait, locomotion, and balance/muscle strength/aerobic capacity/endurance

## 2018-09-14 NOTE — PROGRESS NOTE ADULT - ASSESSMENT
Dementia with delirium.  Pain from multiple fractures could be causing tachycardia.   Avoiding Seroquel for now given her other meds (Sertraline and Mirtazepine) that can prolong QTc    Recommend  Contact Dr. Rodriguez De Leon (pain specialist who knows pt.'s case for years) for analgesia recommendations.   Hold Seroquel until heart rate stabilizes.    Irvin Reyes M.D.  Psychiatry  [phone]

## 2018-09-14 NOTE — PHYSICAL THERAPY INITIAL EVALUATION ADULT - DID THE PATIENT HAVE SURGERY?
7/27/2018 s/p right hip I&D with hemiarthroplasty revision; 8/04/2018 R hip GABRIEL, R femur ORIF, Placement of antibiotic spacer/yes
yes/7/27/2018 s/p right hip I&D with hemiarthroplasty revision; 8/04/2018 R hip GABRIEL, R femur ORIF, Placement of antibiotic spacer
yes/R hip I & D and exchange of femoral head component

## 2018-09-14 NOTE — PROGRESS NOTE ADULT - ASSESSMENT
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 SHe failed washout so had to have leisa and spacer. She has had multiple episodes of subsequent sepsis with respiratory failure and has gotten repeat courses of broad spectrum empiric treatment now off and out of micu.   Recent 9/10 CT of thigh without any collection  GAYATHRI was negative  CXR reviewed, findings old, no obvious signs of pneumonia  Pyuria is a non specific finding with negative urine culture  Leukocytosis may be multifactorial  No obvious explanation for fever but silent aspiration would be possible  Plan  continue vancomycin for 1more days  supportive care  wound care  per PRS  Cefepime started, will observe for response  Will consider extending therapy with minocycline after 6 week course completed given presence of knee hardware and the sustained nature of her bacteremia I think this will be reasonable.   updated at bedside, remains frustrated

## 2018-09-14 NOTE — PROGRESS NOTE ADULT - SUBJECTIVE AND OBJECTIVE BOX
Pt seen and examined at bedside resting comfortably. No acute distress. Pain well controlled.    Vital Signs Last 24 Hrs  T(C): 37.2 (14 Sep 2018 06:05), Max: 37.4 (13 Sep 2018 23:38)  T(F): 98.9 (14 Sep 2018 06:05), Max: 99.3 (13 Sep 2018 23:38)  HR: 106 (14 Sep 2018 06:05) (102 - 115)  BP: 124/67 (14 Sep 2018 06:05) (106/66 - 124/77)  RR: 18 (14 Sep 2018 06:05) (18 - 18)  SpO2: 94% (14 Sep 2018 06:05) (94% - 94%)    O:  Physical exam:  Gen: Alert and Oriented x3, No Acute Distress  EXT:   Incision healing well without signs erythema or discharge           Dressing: wound vacuum in place holding good suction            Motor: wiggles toes            Sensation: SILT            WWP distally           Labs:

## 2018-09-14 NOTE — PROGRESS NOTE ADULT - ASSESSMENT
68y Female POD38 s/p R TREVER explant, articulating spacer and distal femur ORIF, L acetabular fx  - Pain control  - NWB RLE, FFWB LLE  - FU PRS recs regarding wound care  - wound vac in place per plastics  - PT/OT  - DVT ppx: Lovenox  - repeat postop imaging 9/17  - Dispo planning

## 2018-09-14 NOTE — PHYSICAL THERAPY INITIAL EVALUATION ADULT - FOLLOWS COMMANDS/ANSWERS QUESTIONS, REHAB EVAL
25% of the time
able to follow single-step instructions
able to follow single-step instructions/50% of the time

## 2018-09-14 NOTE — PROGRESS NOTE ADULT - PROBLEM SELECTOR PLAN 1
-test BG Q6h  -c/w NPH 7 units Q daily at 12am w/daily prednisone administration  -c/w humalog low correction scale Q6h  -will need to re-evaluate insulin regimen if taken off tube feeds. If not tolerating feeds, may need to increase surveillance for hypoglycemia and may require D5 infusion  discussed w/pt and RN  pager: [phone]

## 2018-09-14 NOTE — PHYSICAL THERAPY INITIAL EVALUATION ADULT - GENERAL OBSERVATIONS, REHAB EVAL
Pt received reclined in chair, A&Ox3 with choices provided, +tele, +NGT, agreeable to session, jimy 30 min.
Pt received reclined in chair, A&Ox3 with choices provided, +tele, +NGT, agreeable to session, jimy 30 min.
Pt received reclined in chair, A&Ox1, +tele, +IV, +HFNC, agreeable to session, jimy 30 min.

## 2018-09-14 NOTE — PROGRESS NOTE ADULT - SUBJECTIVE AND OBJECTIVE BOX
HPI:  No events overnight.  EKG has not been repeated today.  Patient remains on multiple QT prolonging medications, e.g. mirtazepine and sertraline.  Remains tachycardic.    Review Of Systems:     Cannot be accurately assessed due to dementia    Medications:  acetaminophen  IVPB .. 1000 milliGRAM(s) IV Intermittent every 8 hours PRN  ALBUTerol/ipratropium for Nebulization 3 milliLiter(s) Nebulizer every 6 hours  buDESOnide   0.5 milliGRAM(s) Respule 0.5 milliGRAM(s) Inhalation every 12 hours  cefepime   IVPB      cefepime   IVPB 1000 milliGRAM(s) IV Intermittent every 12 hours  chlorhexidine 4% Liquid 1 Application(s) Topical <User Schedule>  clonazePAM Tablet 2 milliGRAM(s) Oral <User Schedule>  dextrose 40% Gel 15 Gram(s) Oral once PRN  dextrose 5%. 1000 milliLiter(s) IV Continuous <Continuous>  dextrose 50% Injectable 12.5 Gram(s) IV Push once  dextrose 50% Injectable 25 Gram(s) IV Push once  dextrose 50% Injectable 25 Gram(s) IV Push once  enoxaparin Injectable 40 milliGRAM(s) SubCutaneous daily  ergocalciferol 99033 Unit(s) Oral every week  glucagon  Injectable 1 milliGRAM(s) IntraMuscular once PRN  insulin lispro (HumaLOG) corrective regimen sliding scale   SubCutaneous every 6 hours  insulin NPH human recombinant 7 Unit(s) SubCutaneous <User Schedule>  lidocaine   Patch 1 Patch Transdermal every 24 hours  mirtazapine 15 milliGRAM(s) Oral <User Schedule>  morphine  - Injectable 1 milliGRAM(s) IV Push every 6 hours PRN  nystatin    Suspension 941599 Unit(s) Oral four times a day  nystatin Powder 1 Application(s) Topical two times a day PRN  oxyCODONE    IR 10 milliGRAM(s) Oral every 8 hours PRN  pantoprazole  Injectable 40 milliGRAM(s) IV Push every 24 hours  predniSONE   Tablet 7.5 milliGRAM(s) Oral <User Schedule>  sertraline 50 milliGRAM(s) Oral daily  vancomycin  IVPB 500 milliGRAM(s) IV Intermittent every 24 hours    PAST MEDICAL & SURGICAL HISTORY:  CVA (cerebral vascular accident)  Fatty pancreas  PNA (pneumonia)  Pulmonary HTN  IGT (impaired glucose tolerance)  Ulcerative colitis  Acid reflux  Anxiety  Depression  Mouth sores  HLD (hyperlipidemia)  Asthma  Humeral head fracture  H/O: hysterectomy  H/O cataract extraction, left  History of knee replacement    Vitals:  T(C): 37.2 (18 @ 06:05), Max: 37.4 (18 @ 23:38)  HR: 106 (18 @ 06:05) (102 - 115)  BP: 124/67 (18 @ 06:05) (106/66 - 124/77)  BP(mean): --  RR: 18 (18 @ 06:05) (18 - 18)  SpO2: 94% (18 @ 06:05) (94% - 94%)  Wt(kg): --  Daily     Daily Weight in k.4 (14 Sep 2018 06:44)  I&O's Summary    13 Sep 2018 07:01  -  14 Sep 2018 07:00  --------------------------------------------------------  IN: 560 mL / OUT: 800 mL / NET: -240 mL        Physical Exam:  Appearance: Frail; appears older than stated age; well groomed, NAD  Eyes: PERRLA, EOMI, pink conjunctiva, no scleral icterus   HENT: Normal oral mucosa  Cardiovascular: tachycardic S1, S2, no murmur, rub, or gallop; no edema; no JVD; PICC in LUE  Respiratory: Clear to auscultation bilaterally  Gastrointestinal: Soft, non-tender, non-distended, BS+  Musculoskeletal: right hip wound noted   Neurologic: No focal weakness  Lymphatic: No lymphadenopathy  Psychiatry: awake and alert  Skin: No rashes, ecchymoses, or cyanosis                        7.3    14.34 )-----------( 355      ( 14 Sep 2018 09:38 )             23.6         140  |  100  |  39<H>  ----------------------------<  201<H>  4.5   |  28  |  0.64    Ca    8.8      13 Sep 2018 06:41  Mg     2.0               ECG: sinus tachycardia with normal QT but prolonged QTc    Echo: < from: Transesophageal Echocardiogram (18 @ 16:42) >  ------------------------------------------------------------------------  Conclusions:  1. Tethered , mildly calcified mitral valve leaflets with  normal opening. Mild-moderate mitral regurgitation.  2. Sclerotic aortic valve leaflets with normal opening.  Mild aortic regurgitation.  3. Severe global left ventricular systolic dysfunction.  4. Right ventricular enlargement with decreased right  ventricular systolic function.  5. Thickened tricuspid valve. Mild-moderate tricuspid  regurgitation.  6. Color Doppler demonstrates evidence of a patent foramen  ovale.  7. No evidence of valvular vegetation is seen.  ------------------------------------------------------------------------  Confirmed on  2018 - 15:06:58 by Margaret Khan M.D.  ------------------------------------------------------------------------    < end of copied text >    < from: TTE with Doppler (w/Cont) (18 @ 20:12) >  ------------------------------------------------------------------------  Conclusions:  1. Endocardial visualization enhanced with intravenous  injection of Ultrasonic Enhancing Agent (Definity). Left  ventricle suboptimally visualized despite the use of  intravenous echo contrast; overall normal left ventricular  systolic function. The mid inferoseptum appears hypokinetic  on certain views.  2. The right ventricle is notwell visualized; grossly  normal right ventricular size and systolic function.  *** Compared with echocardiogram of 2018, there has  been improvement of LV and RV systolic function.  ------------------------------------------------------------------------  Confirmed on  2018 - 12:38:38 by Belkys Altman M.D.  ------------------------------------------------------------------------    < end of copied text >

## 2018-09-14 NOTE — PROGRESS NOTE ADULT - ASSESSMENT
68 year-old woman with Alzheimer's dementia seen to have episode of periprocedural anxiety, shortness of breath, and chest discomfort that resolved spontaneously and has not recurred.  Her most recent TTE shows interval improvement in LV systolic function.    QT is normal (QTc prolonged in setting of sinus tachycardia).  Risk/benefit in favor of giving Seroquel if recommended by psychiatry for behavioral disturbance, however, would recommend waiting until sinus tachycardia improves as patient is on multiple QT prolonging agents.  Would wait for sinus tach to improve with hydration, feeding. Then reassess QTc.    Discharge planning to rehab per primary team.  Please reconsult for any further cardiovascular concerns.

## 2018-09-14 NOTE — PHYSICAL THERAPY INITIAL EVALUATION ADULT - CRITERIA FOR SKILLED THERAPEUTIC INTERVENTIONS
functional limitations in following categories/risk reduction/prevention/anticipated discharge recommendation/predicted duration of therapy intervention/therapy frequency/rehab potential/impairments found
impairments found
impairments found/rehab potential/risk reduction/prevention/therapy frequency/anticipated equipment needs at discharge/anticipated discharge recommendation/predicted duration of therapy intervention/functional limitations in following categories

## 2018-09-14 NOTE — PHYSICAL THERAPY INITIAL EVALUATION ADULT - LIVES WITH, PROFILE
spouse/children/Prior to hospitalization pt residing in a private home in [place], [place] with her spouse and 2 daughters. Patient independent with ADLs however family shared that she needs assistance at times with ambulation prior to admission.
Prior to hospitalization pt residing in a private home in [place], [place] with her spouse and 2 daughters. Patient independent with ADLs however family shared that she needs assistance at times with ambulation prior to admission./spouse/children
Prior to hospitalization pt residing in a private home in [place], [place] with her spouse and 2 daughters. Patient independent with ADLs however family shared that she needs assistance at times with ambulation prior to admission./children/spouse

## 2018-09-14 NOTE — PHYSICAL THERAPY INITIAL EVALUATION ADULT - PLANNED THERAPY INTERVENTIONS, PT EVAL
bed mobility training/gait training/transfer training/balance training
Negative Pressure Wound Therapy
bed mobility training/strengthening/balance training

## 2018-09-14 NOTE — PHYSICAL THERAPY INITIAL EVALUATION ADULT - DIAGNOSIS, PT EVAL
impaired integument
decreased functional mobility secondary to decreased strength, decreased balance, impaired balance, decreased endurance, cardiopulmonary deconditioning
decreased functional mobility secondary to generalized weakness, impaired balance, decreased endurance, impaired cognition

## 2018-09-14 NOTE — PROGRESS NOTE ADULT - SUBJECTIVE AND OBJECTIVE BOX
CC: f/u for MRSA bacteremia and infected rt hip    Patient reports: she is alert, non verbal,  thinks in constant pain.She is incontinent of urine.Tolerating enteral feeds, RT hip wound with vac.Febrile x 24 hours.Cefepime started earlier    REVIEW OF SYSTEMS:  All other review of systems negative (Comprehensive ROS): limited by condition    Antimicrobials Day #  : vanco, day 1 cefepime  cefepime   IVPB      cefepime   IVPB 1000 milliGRAM(s) IV Intermittent every 12 hours  nystatin    Suspension 073895 Unit(s) Oral four times a day  vancomycin  IVPB 500 milliGRAM(s) IV Intermittent every 24 hours    Other Medications Reviewed    T(F): 100.8 (18 @ 12:15), Max: 100.8 (18 @ 12:15)  HR: 112 (18 @ 11:33)  BP: 155/87 (18 @ 11:33)  RR: 18 (18 @ 11:33)  SpO2: 95% (18 @ 11:33)  Wt(kg): --    PHYSICAL EXAM:  General: alert, no acute distress, not verbal  Eyes:  anicteric, no conjunctival injection, no discharge  Oropharynx: no lesions or injection 	  Neck: supple, without adenopathy  Lungs: clear to auscultation, poor inspiratory effert  Heart: regular rate and rhythm; no murmur, rubs or gallops  Abdomen: soft, nondistended, nontender, without mass or organomegaly  Skin: no lesions  Extremities: no clubbing, cyanosis, or edema  Neurologic: alertnot verbal,, moves all extremities  Rt hip with a wound vac    LAB RESULTS:                        7.3    14.34 )-----------( 355      ( 14 Sep 2018 09:38 )             23.6     09-13    140  |  100  |  39<H>  ----------------------------<  201<H>  4.5   |  28  |  0.64    Ca    8.8      13 Sep 2018 06:41  Mg     2.0     -        Urinalysis Basic - ( 13 Sep 2018 08:03 )    Color: Yellow / Appearance: Clear / S.020 / pH: x  Gluc: x / Ketone: Negative  / Bili: Negative / Urobili: Negative mg/dL   Blood: x / Protein: 100 mg/dL / Nitrite: Negative   Leuk Esterase: Moderate / RBC: 334 /HPF / WBC 37 /HPF   Sq Epi: x / Non Sq Epi: 15 /HPF / Bacteria: Few      MICROBIOLOGY:  RECENT CULTURES:   @ 15:06 .Urine Catheterized     No growth       @ 09:24 .Blood Blood     No growth to date.       @ 00:48 .Urine Catheterized     No growth          RADIOLOGY REVIEWED:  < from: Xray Chest 1 View- PORTABLE-Routine (18 @ 18:35) >  IMPRESSION:     Left-sided PICC line in place with its distal tip overlying the SVC.   Moderate pulmonary edema.   Bilateral pleural effusions and associated atelectasis.    < end of copied text >

## 2018-09-14 NOTE — PROGRESS NOTE ADULT - SUBJECTIVE AND OBJECTIVE BOX
Diabetes Follow up note:  Interval Hx:  68 year old female w/steroid induced hyperglycemia on chronic prednisone on continuous tube feeds. 's-200mg/dl over past 24 hours. Pt seen at bedside. c/o leg pain.       Review of Systems:  General: "I have pain"  GI: Tolerating TFs without any N/V/D/ABD PAIN. + loose stool seen when at bedside.   CV: No CP/SOB  ENDO: No S&Sx of hypoglycemia  MEDS:    insulin lispro (HumaLOG) corrective regimen sliding scale   SubCutaneous every 6 hours  insulin NPH human recombinant 7 Unit(s) SubCutaneous <User Schedule>  predniSONE   Tablet 7.5 milliGRAM(s) Oral <User Schedule>    cefepime   IVPB      cefepime   IVPB 1000 milliGRAM(s) IV Intermittent every 12 hours  nystatin    Suspension 713217 Unit(s) Oral four times a day  vancomycin  IVPB 500 milliGRAM(s) IV Intermittent every 24 hours    Allergies    ASA; dye contrast (Anaphylaxis)  aspirin (Short breath)  divalproex sodium (Other (Unknown))  Haldol (Other (Unknown))  penicillin (Short breath; Rash)  sulfa drugs (Short breath; Rash)  Xanax (Other (Unknown))      PE:  General: Female lying in bed. NAD.   Vital Signs Last 24 Hrs  T(C): 38.2 (14 Sep 2018 12:15), Max: 38.2 (14 Sep 2018 12:15)  T(F): 100.8 (14 Sep 2018 12:15), Max: 100.8 (14 Sep 2018 12:15)  HR: 112 (14 Sep 2018 11:33) (102 - 115)  BP: 155/87 (14 Sep 2018 11:33) (106/66 - 155/87)  BP(mean): --  RR: 18 (14 Sep 2018 11:33) (18 - 18)  SpO2: 95% (14 Sep 2018 11:33) (94% - 95%)  Abd: Soft, NT,ND, + liquid stool + PEG  Extremities: Warm. R leg w/wound vac c/d/i  Neuro: A&O X 1    LABS:    POCT Blood Glucose.: 145 mg/dL (09-14-18 @ 12:30)  POCT Blood Glucose.: 200 mg/dL (09-14-18 @ 06:18)  POCT Blood Glucose.: 143 mg/dL (09-13-18 @ 23:36)  POCT Blood Glucose.: 126 mg/dL (09-13-18 @ 17:41)  POCT Blood Glucose.: 156 mg/dL (09-13-18 @ 12:09)  POCT Blood Glucose.: 218 mg/dL (09-13-18 @ 06:06)  POCT Blood Glucose.: 176 mg/dL (09-13-18 @ 00:34)  POCT Blood Glucose.: 180 mg/dL (09-12-18 @ 17:44)  POCT Blood Glucose.: 139 mg/dL (09-12-18 @ 12:38)  POCT Blood Glucose.: 200 mg/dL (09-12-18 @ 05:50)  POCT Blood Glucose.: 171 mg/dL (09-11-18 @ 23:54)  POCT Blood Glucose.: 141 mg/dL (09-11-18 @ 18:07)                            7.3    14.34 )-----------( 355      ( 14 Sep 2018 09:38 )             23.6       09-13    140  |  100  |  39<H>  ----------------------------<  201<H>  4.5   |  28  |  0.64    Ca    8.8      13 Sep 2018 06:41  Mg     2.0     09-13      Hemoglobin A1C, Whole Blood: 5.5 % [4.0 - 5.6] (07-27-18 @ 09:02)            Contact number: darian [phone] or [phone]

## 2018-09-14 NOTE — PROGRESS NOTE ADULT - SUBJECTIVE AND OBJECTIVE BOX
---___---___---___---___---___---___ ---___---___---___---___---___---___---___---___---___---                    <<<  M E D I C A L   A T T E N D I N G    F O L L O W    U P   N O T E  >>>  unchanged except or insomnia and anxiety   ---___---___---___---___---___---      <<<  MEDICATIONS:  >>>    MEDICATIONS  (STANDING):  ALBUTerol/ipratropium for Nebulization 3 milliLiter(s) Nebulizer every 6 hours  buDESOnide   0.5 milliGRAM(s) Respule 0.5 milliGRAM(s) Inhalation every 12 hours  cefepime   IVPB      cefepime   IVPB 1000 milliGRAM(s) IV Intermittent every 12 hours  chlorhexidine 4% Liquid 1 Application(s) Topical <User Schedule>  clonazePAM Tablet 2 milliGRAM(s) Oral <User Schedule>  dextrose 5%. 1000 milliLiter(s) (50 mL/Hr) IV Continuous <Continuous>  dextrose 50% Injectable 12.5 Gram(s) IV Push once  dextrose 50% Injectable 25 Gram(s) IV Push once  dextrose 50% Injectable 25 Gram(s) IV Push once  enoxaparin Injectable 40 milliGRAM(s) SubCutaneous daily  ergocalciferol 73090 Unit(s) Oral every week  insulin lispro (HumaLOG) corrective regimen sliding scale   SubCutaneous every 6 hours  insulin NPH human recombinant 7 Unit(s) SubCutaneous <User Schedule>  lidocaine   Patch 1 Patch Transdermal every 24 hours  mirtazapine 15 milliGRAM(s) Oral <User Schedule>  nystatin    Suspension 961676 Unit(s) Oral four times a day  pantoprazole  Injectable 40 milliGRAM(s) IV Push every 24 hours  predniSONE   Tablet 7.5 milliGRAM(s) Oral <User Schedule>  sertraline 50 milliGRAM(s) Oral daily  vancomycin  IVPB 500 milliGRAM(s) IV Intermittent every 24 hours      MEDICATIONS  (PRN):  acetaminophen  IVPB .. 1000 milliGRAM(s) IV Intermittent every 8 hours PRN Mild Pain (1 - 3), Moderate Pain (4 - 6), Severe Pain (7 - 10)  dextrose 40% Gel 15 Gram(s) Oral once PRN Blood Glucose LESS THAN 70 milliGRAM(s)/deciLiter  glucagon  Injectable 1 milliGRAM(s) IntraMuscular once PRN Glucose <70 milliGRAM(s)/deciLiter  morphine  - Injectable 1 milliGRAM(s) IV Push every 6 hours PRN breakthrough pain  nystatin Powder 1 Application(s) Topical two times a day PRN rash/itchiness  oxyCODONE    IR 10 milliGRAM(s) Oral every 8 hours PRN Severe Pain (7 - 10)       ---___---___---___---___---___---     <<<REVIEW OF SYSTEM: >>>    GEN: no fever, no chills, no pain  RESP: no SOB, no cough, no sputum  CVS: no chest pain, no palpitations, no edema  GI: no abdominal pain, no nausea, no vomiting, no constipation, no diarrhea  : no dysurea, no frequency  NEURO: no headache, no dizziness  PSYCH: no depression, not anxious  Derm : no itching, no rash     ---___---___---___---___---___---          <<<  VITAL SIGNS: >>>    T(F): 100.8 (18 @ 12:15), Max: 100.8 (18 @ 12:15)  HR: 112 (18 @ 11:33) (102 - 115)  BP: 155/87 (18 @ 11:33) (106/66 - 155/87)  RR: 18 (18 @ 11:33) (18 - 18)  SpO2: 95% (18 @ 11:33) (94% - 95%)  Wt(kg): --  CAPILLARY BLOOD GLUCOSE      POCT Blood Glucose.: 145 mg/dL (14 Sep 2018 12:30)    I&O's Summary    13 Sep 2018 07:01  -  14 Sep 2018 07:00  --------------------------------------------------------  IN: 560 mL / OUT: 800 mL / NET: -240 mL    14 Sep 2018 07:01  -  14 Sep 2018 14:43  --------------------------------------------------------  IN: 0 mL / OUT: 550 mL / NET: -550 mL         ---___---___---___---___---___---                       PHYSICAL EXAM:    GEN: A&O X 2 , NAD , comfortable  HEENT: NCAT, PERRL, MMM, no scleral icterus, hearing intact  NECK: Supple, No JVD  CVS: S1S2 , regular , No M/R/G appreciated  PULM: CTA B/L,  no W/R/R appreciated  ABD.: soft. non tender, non distended,  bowel sounds present peg noted  Extrem: intact pulses , no edema noted  Derm: No rash or ecchymosis noted  PSYCH: anxious      ---___---___---___---___---___---     <<<  LAB AND IMAGING: >>>                          7.3    14.34 )-----------( 355      ( 14 Sep 2018 09:38 )             23.6               09-13    140  |  100  |  39<H>  ----------------------------<  201<H>  4.5   |  28  |  0.64    Ca    8.8      13 Sep 2018 06:41  Mg     2.0                  Urinalysis Basic - ( 13 Sep 2018 08:03 )    Color: Yellow / Appearance: Clear / S.020 / pH: x  Gluc: x / Ketone: Negative  / Bili: Negative / Urobili: Negative mg/dL   Blood: x / Protein: 100 mg/dL / Nitrite: Negative   Leuk Esterase: Moderate / RBC: 334 /HPF / WBC 37 /HPF   Sq Epi: x / Non Sq Epi: 15 /HPF / Bacteria: Few                        [All pertinent / recent available Imaging reports and other labs reviewed]     ---___---___---___---___---___---___ ---___---___---___---___---           <<<  A S S E S S M E N T   A N D   P L A N :  >>>          -GI/DVT Prophylaxis.    --------------------------------------------  Case discussed with   Education given on     >>______________________<<      Deniz Bolden .         phone   [phone]

## 2018-09-14 NOTE — PROGRESS NOTE ADULT - SUBJECTIVE AND OBJECTIVE BOX
Events noted. Per , pt. did not sleep at all last night as she had pain (repeated "ouch") and failed morphine and oxycodone. Note of Dr. Whitehead (cardiologist) read and appreciated. Elevated QTc from tachycardia and it is recommended to hold Seroquel until heart rate stabilizes.     Vital Signs Last 24 Hrs  T(C): 38.2 (14 Sep 2018 12:15), Max: 38.2 (14 Sep 2018 12:15)  T(F): 100.8 (14 Sep 2018 12:15), Max: 100.8 (14 Sep 2018 12:15)  HR: 112 (14 Sep 2018 11:33) (106 - 115)  BP: 155/87 (14 Sep 2018 11:33) (106/66 - 155/87)  BP(mean): --  RR: 18 (14 Sep 2018 11:33) (18 - 18)  SpO2: 95% (14 Sep 2018 11:33) (94% - 95%)                          7.3    14.34 )-----------( 355      ( 14 Sep 2018 09:38 )             23.6       09-13    140  |  100  |  39<H>  ----------------------------<  201<H>  4.5   |  28  |  0.64    Ca    8.8      13 Sep 2018 06:41  Mg     2.0     09-13                MEDICATIONS  (STANDING):  ALBUTerol/ipratropium for Nebulization 3 milliLiter(s) Nebulizer every 6 hours  buDESOnide   0.5 milliGRAM(s) Respule 0.5 milliGRAM(s) Inhalation every 12 hours  cefepime   IVPB      cefepime   IVPB 1000 milliGRAM(s) IV Intermittent every 12 hours  chlorhexidine 4% Liquid 1 Application(s) Topical <User Schedule>  clonazePAM Tablet 2 milliGRAM(s) Oral <User Schedule>  dextrose 5%. 1000 milliLiter(s) (50 mL/Hr) IV Continuous <Continuous>  dextrose 50% Injectable 12.5 Gram(s) IV Push once  dextrose 50% Injectable 25 Gram(s) IV Push once  dextrose 50% Injectable 25 Gram(s) IV Push once  enoxaparin Injectable 40 milliGRAM(s) SubCutaneous daily  ergocalciferol 84369 Unit(s) Oral every week  insulin lispro (HumaLOG) corrective regimen sliding scale   SubCutaneous every 6 hours  insulin NPH human recombinant 7 Unit(s) SubCutaneous <User Schedule>  lidocaine   Patch 1 Patch Transdermal every 24 hours  mirtazapine 15 milliGRAM(s) Oral <User Schedule>  nystatin    Suspension 774510 Unit(s) Oral four times a day  pantoprazole  Injectable 40 milliGRAM(s) IV Push every 24 hours  predniSONE   Tablet 7.5 milliGRAM(s) Oral <User Schedule>  sertraline 50 milliGRAM(s) Oral daily  vancomycin  IVPB 500 milliGRAM(s) IV Intermittent every 24 hours    MEDICATIONS  (PRN):  acetaminophen  IVPB .. 1000 milliGRAM(s) IV Intermittent every 8 hours PRN Mild Pain (1 - 3), Moderate Pain (4 - 6), Severe Pain (7 - 10)  dextrose 40% Gel 15 Gram(s) Oral once PRN Blood Glucose LESS THAN 70 milliGRAM(s)/deciLiter  glucagon  Injectable 1 milliGRAM(s) IntraMuscular once PRN Glucose <70 milliGRAM(s)/deciLiter  morphine  - Injectable 1 milliGRAM(s) IV Push every 6 hours PRN breakthrough pain  nystatin Powder 1 Application(s) Topical two times a day PRN rash/itchiness  oxyCODONE    IR 10 milliGRAM(s) Oral every 8 hours PRN Severe Pain (7 - 10)      Elderly WF in bed, in fetal position. Awake, picking at lines, mute. No diaphoresis nor tremors.

## 2018-09-14 NOTE — PROGRESS NOTE ADULT - ASSESSMENT
68 year old female w/hyperglycemia (chronic steroid use) on continuous tube feeds here w/weakness and MRSA bacteremia w/wound vac. BG values stable on present insulin regimen. No need for tight glycemic control so wouldn't further increase insulin until a pattern can be further assessed. Plan for MBS on Monday so may need to adjust regimen if can be advanced to PO diet.

## 2018-09-15 LAB
BLD GP AB SCN SERPL QL: NEGATIVE — SIGNIFICANT CHANGE UP
CULTURE RESULTS: SIGNIFICANT CHANGE UP
GLUCOSE BLDC GLUCOMTR-MCNC: 143 MG/DL — HIGH (ref 70–99)
GLUCOSE BLDC GLUCOMTR-MCNC: 145 MG/DL — HIGH (ref 70–99)
GLUCOSE BLDC GLUCOMTR-MCNC: 168 MG/DL — HIGH (ref 70–99)
GLUCOSE BLDC GLUCOMTR-MCNC: 209 MG/DL — HIGH (ref 70–99)
HCT VFR BLD CALC: 23.6 % — LOW (ref 34.5–45)
HGB BLD-MCNC: 7.1 G/DL — LOW (ref 11.5–15.5)
MAGNESIUM SERPL-MCNC: 1.8 MG/DL — SIGNIFICANT CHANGE UP (ref 1.6–2.6)
MCHC RBC-ENTMCNC: 29.1 PG — SIGNIFICANT CHANGE UP (ref 27–34)
MCHC RBC-ENTMCNC: 30.1 GM/DL — LOW (ref 32–36)
MCV RBC AUTO: 96.7 FL — SIGNIFICANT CHANGE UP (ref 80–100)
PHOSPHATE SERPL-MCNC: 4.3 MG/DL — SIGNIFICANT CHANGE UP (ref 2.5–4.5)
PLATELET # BLD AUTO: 353 K/UL — SIGNIFICANT CHANGE UP (ref 150–400)
RBC # BLD: 2.44 M/UL — LOW (ref 3.8–5.2)
RBC # FLD: 18.1 % — HIGH (ref 10.3–14.5)
RH IG SCN BLD-IMP: NEGATIVE — SIGNIFICANT CHANGE UP
SPECIMEN SOURCE: SIGNIFICANT CHANGE UP
WBC # BLD: 12.44 K/UL — HIGH (ref 3.8–10.5)
WBC # FLD AUTO: 12.44 K/UL — HIGH (ref 3.8–10.5)

## 2018-09-15 RX ADMIN — MIRTAZAPINE 15 MILLIGRAM(S): 45 TABLET, ORALLY DISINTEGRATING ORAL at 22:15

## 2018-09-15 RX ADMIN — Medication 2 MILLIGRAM(S): at 22:15

## 2018-09-15 RX ADMIN — ENOXAPARIN SODIUM 40 MILLIGRAM(S): 100 INJECTION SUBCUTANEOUS at 12:04

## 2018-09-15 RX ADMIN — CEFEPIME 100 MILLIGRAM(S): 1 INJECTION, POWDER, FOR SOLUTION INTRAMUSCULAR; INTRAVENOUS at 17:53

## 2018-09-15 RX ADMIN — Medication 3 MILLILITER(S): at 06:31

## 2018-09-15 RX ADMIN — SERTRALINE 50 MILLIGRAM(S): 25 TABLET, FILM COATED ORAL at 12:04

## 2018-09-15 RX ADMIN — LIDOCAINE 1 PATCH: 4 CREAM TOPICAL at 22:15

## 2018-09-15 RX ADMIN — MORPHINE SULFATE 1 MILLIGRAM(S): 50 CAPSULE, EXTENDED RELEASE ORAL at 22:12

## 2018-09-15 RX ADMIN — Medication 500000 UNIT(S): at 17:54

## 2018-09-15 RX ADMIN — MORPHINE SULFATE 1 MILLIGRAM(S): 50 CAPSULE, EXTENDED RELEASE ORAL at 18:25

## 2018-09-15 RX ADMIN — HUMAN INSULIN 7 UNIT(S): 100 INJECTION, SUSPENSION SUBCUTANEOUS at 00:15

## 2018-09-15 RX ADMIN — Medication 1: at 00:15

## 2018-09-15 RX ADMIN — CEFEPIME 100 MILLIGRAM(S): 1 INJECTION, POWDER, FOR SOLUTION INTRAMUSCULAR; INTRAVENOUS at 06:32

## 2018-09-15 RX ADMIN — CHLORHEXIDINE GLUCONATE 1 APPLICATION(S): 213 SOLUTION TOPICAL at 08:59

## 2018-09-15 RX ADMIN — MORPHINE SULFATE 1 MILLIGRAM(S): 50 CAPSULE, EXTENDED RELEASE ORAL at 12:27

## 2018-09-15 RX ADMIN — Medication 0.5 MILLIGRAM(S): at 12:04

## 2018-09-15 RX ADMIN — Medication 100 MILLIGRAM(S): at 08:59

## 2018-09-15 RX ADMIN — Medication 2: at 06:32

## 2018-09-15 RX ADMIN — MORPHINE SULFATE 1 MILLIGRAM(S): 50 CAPSULE, EXTENDED RELEASE ORAL at 12:09

## 2018-09-15 RX ADMIN — LIDOCAINE 1 PATCH: 4 CREAM TOPICAL at 12:05

## 2018-09-15 RX ADMIN — MORPHINE SULFATE 1 MILLIGRAM(S): 50 CAPSULE, EXTENDED RELEASE ORAL at 22:38

## 2018-09-15 RX ADMIN — Medication 500000 UNIT(S): at 12:05

## 2018-09-15 RX ADMIN — Medication 500000 UNIT(S): at 06:31

## 2018-09-15 RX ADMIN — Medication 3 MILLILITER(S): at 17:54

## 2018-09-15 RX ADMIN — Medication 3 MILLILITER(S): at 12:04

## 2018-09-15 RX ADMIN — Medication 7.5 MILLIGRAM(S): at 00:15

## 2018-09-15 RX ADMIN — PANTOPRAZOLE SODIUM 40 MILLIGRAM(S): 20 TABLET, DELAYED RELEASE ORAL at 12:05

## 2018-09-15 RX ADMIN — MORPHINE SULFATE 1 MILLIGRAM(S): 50 CAPSULE, EXTENDED RELEASE ORAL at 18:08

## 2018-09-15 NOTE — PROGRESS NOTE ADULT - SUBJECTIVE AND OBJECTIVE BOX
CC: f/u for MRSA Rt Hip infection    Patient reports: she is comfortable,  happy because she had a restful night and he feels pain is better controlled.No fever this AM    REVIEW OF SYSTEMS:  All other review of systems negative (Comprehensive ROS)    Antimicrobials Day #  :day 42/42 vanco, day 2 cefepime  cefepime   IVPB      cefepime   IVPB 1000 milliGRAM(s) IV Intermittent every 12 hours  nystatin    Suspension 739027 Unit(s) Oral four times a day  vancomycin  IVPB 500 milliGRAM(s) IV Intermittent every 24 hours    Other Medications Reviewed    T(F): 98.3 (09-15-18 @ 04:31), Max: 100.8 (09-14-18 @ 12:15)  HR: 105 (09-15-18 @ 04:31)  BP: 114/74 (09-15-18 @ 04:31)  RR: 18 (09-15-18 @ 04:31)  SpO2: 97% (09-15-18 @ 04:31)  Wt(kg): --    PHYSICAL EXAM:  General: alert, no acute distress  Eyes:  anicteric, no conjunctival injection, no discharge  Oropharynx: no lesions or injection 	  Neck: supple, without adenopathy  Lungs: clear to auscultation, poor inspiratory effert  Heart: regular rate and rhythm; no murmur, rubs or gallops  Abdomen: soft, nondistended, nontender, without mass or organomegaly, peg in place  Skin: no lesions  Extremities: no clubbing, cyanosis, or edema  Neurologic: alert, pleasantly confused, moves all extremities  RT hip with wound vac  LAB RESULTS:                        7.1    12.44 )-----------( 353      ( 15 Sep 2018 08:27 )             23.6       Phos  4.3     09-15  Mg     1.8     09-15          MICROBIOLOGY:  RECENT CULTURES:  09-13 @ 18:42 .Blood Blood-Venous     No growth to date.      09-11 @ 15:06 .Urine Catheterized     No growth      09-11 @ 09:24 .Blood Blood     No growth to date.      09-11 @ 00:48 .Urine Catheterized     No growth          RADIOLOGY REVIEWED:    < from: Xray Chest 1 View- PORTABLE-Routine (09.12.18 @ 18:35) >  IMPRESSION:     Left-sided PICC line in place with its distal tip overlying the SVC.   Moderate pulmonary edema.   Bilateral pleural effusions and associated atelectasis.    < end of copied text >

## 2018-09-15 NOTE — PROGRESS NOTE ADULT - ASSESSMENT
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 SHe failed washout so had to have leisa and spacer. She has had multiple episodes of subsequent sepsis with respiratory failure and has gotten repeat courses of broad spectrum empiric treatment now off and out of micu.   Recent 9/10 CT of thigh without any collection  GAYATHRI was negative  CXR reviewed, findings old, no obvious signs of pneumonia  Pyuria is a non specific finding with negative urine culture  Leukocytosis may be multifactorial  No obvious explanation for fever but silent aspiration would be possible, may be most likely scenario  Plan  Last day of [facility]  supportive care  wound care  per PRS  Cefepime started, will observe for response  Will consider extending therapy with minocycline after 6 week course completed given presence of knee hardware and the sustained nature of her bacteremia I think this will be reasonable.   updated at bedside, happier with her appearance today

## 2018-09-15 NOTE — PROGRESS NOTE ADULT - ASSESSMENT
Less agitation and better sleep after receiving Fentanyl patch.  Still tachycardic so we are avoiding Seroquel until this resolves per cardiology's recommendation    Recommend  Continue with current rx.  Following.    Irvin Reyes M.D.  Psychiatry  [phone]

## 2018-09-15 NOTE — PROGRESS NOTE ADULT - SUBJECTIVE AND OBJECTIVE BOX
Events noted. Slept well per  and is calmer with Fentanyl patch. Still tachycardic. Off Seroquel.       Vital Signs Last 24 Hrs  T(C): 37 (15 Sep 2018 14:50), Max: 37.6 (14 Sep 2018 20:25)  T(F): 98.6 (15 Sep 2018 14:50), Max: 99.7 (14 Sep 2018 20:25)  HR: 112 (15 Sep 2018 14:50) (105 - 112)  BP: 144/75 (15 Sep 2018 14:50) (111/65 - 144/75)  BP(mean): --  RR: 19 (15 Sep 2018 14:50) (18 - 19)  SpO2: 93% (15 Sep 2018 14:50) (91% - 97%)                          7.1    12.44 )-----------( 353      ( 15 Sep 2018 08:27 )             23.6         Phos  4.3     09-15  Mg     1.8     09-15                MEDICATIONS  (STANDING):  ALBUTerol/ipratropium for Nebulization 3 milliLiter(s) Nebulizer every 6 hours  buDESOnide   0.5 milliGRAM(s) Respule 0.5 milliGRAM(s) Inhalation every 12 hours  cefepime   IVPB      cefepime   IVPB 1000 milliGRAM(s) IV Intermittent every 12 hours  chlorhexidine 4% Liquid 1 Application(s) Topical <User Schedule>  clonazePAM Tablet 2 milliGRAM(s) Oral <User Schedule>  dextrose 5%. 1000 milliLiter(s) (50 mL/Hr) IV Continuous <Continuous>  dextrose 50% Injectable 12.5 Gram(s) IV Push once  dextrose 50% Injectable 25 Gram(s) IV Push once  dextrose 50% Injectable 25 Gram(s) IV Push once  enoxaparin Injectable 40 milliGRAM(s) SubCutaneous daily  ergocalciferol 86381 Unit(s) Oral every week  fentaNYL   Patch  12 MICROgram(s)/Hr 1 Patch Transdermal every 72 hours  insulin lispro (HumaLOG) corrective regimen sliding scale   SubCutaneous every 6 hours  insulin NPH human recombinant 7 Unit(s) SubCutaneous <User Schedule>  lidocaine   Patch 1 Patch Transdermal every 24 hours  mirtazapine 15 milliGRAM(s) Oral <User Schedule>  nystatin    Suspension 579807 Unit(s) Oral four times a day  pantoprazole  Injectable 40 milliGRAM(s) IV Push every 24 hours  predniSONE   Tablet 7.5 milliGRAM(s) Oral <User Schedule>  sertraline 50 milliGRAM(s) Oral daily  vancomycin  IVPB 500 milliGRAM(s) IV Intermittent every 24 hours    MEDICATIONS  (PRN):  acetaminophen  IVPB .. 1000 milliGRAM(s) IV Intermittent every 8 hours PRN Mild Pain (1 - 3), Moderate Pain (4 - 6), Severe Pain (7 - 10)  dextrose 40% Gel 15 Gram(s) Oral once PRN Blood Glucose LESS THAN 70 milliGRAM(s)/deciLiter  glucagon  Injectable 1 milliGRAM(s) IntraMuscular once PRN Glucose <70 milliGRAM(s)/deciLiter  morphine  - Injectable 1 milliGRAM(s) IV Push every 4 hours PRN breakthrough pain  nystatin Powder 1 Application(s) Topical two times a day PRN rash/itchiness      Elderly WF in bed, awake, picking on blanket, calm, cooperative, alert and oriented x 1. Speaks 1-2 words. No tremors nor diaphoresis .  No psychomotor abnormalities. Insight and judgment are impaired.   No hallucinations nor delusions. No suicidal and homicidal ideation and plan. Mood is euthymic and affect constricted. Attention and concentration, short term memory, and long term memory all impaired.

## 2018-09-15 NOTE — PROGRESS NOTE ADULT - SUBJECTIVE AND OBJECTIVE BOX
INTERVAL HPI/OVERNIGHT EVENTS:    MEDICATIONS  (STANDING):  ALBUTerol/ipratropium for Nebulization 3 milliLiter(s) Nebulizer every 6 hours  buDESOnide   0.5 milliGRAM(s) Respule 0.5 milliGRAM(s) Inhalation every 12 hours  cefepime   IVPB      cefepime   IVPB 1000 milliGRAM(s) IV Intermittent every 12 hours  chlorhexidine 4% Liquid 1 Application(s) Topical <User Schedule>  clonazePAM Tablet 2 milliGRAM(s) Oral <User Schedule>  dextrose 5%. 1000 milliLiter(s) (50 mL/Hr) IV Continuous <Continuous>  dextrose 50% Injectable 12.5 Gram(s) IV Push once  dextrose 50% Injectable 25 Gram(s) IV Push once  dextrose 50% Injectable 25 Gram(s) IV Push once  enoxaparin Injectable 40 milliGRAM(s) SubCutaneous daily  ergocalciferol 71347 Unit(s) Oral every week  fentaNYL   Patch  12 MICROgram(s)/Hr 1 Patch Transdermal every 72 hours  insulin lispro (HumaLOG) corrective regimen sliding scale   SubCutaneous every 6 hours  insulin NPH human recombinant 7 Unit(s) SubCutaneous <User Schedule>  lidocaine   Patch 1 Patch Transdermal every 24 hours  mirtazapine 15 milliGRAM(s) Oral <User Schedule>  nystatin    Suspension 975798 Unit(s) Oral four times a day  pantoprazole  Injectable 40 milliGRAM(s) IV Push every 24 hours  predniSONE   Tablet 7.5 milliGRAM(s) Oral <User Schedule>  sertraline 50 milliGRAM(s) Oral daily  vancomycin  IVPB 500 milliGRAM(s) IV Intermittent every 24 hours    MEDICATIONS  (PRN):  acetaminophen  IVPB .. 1000 milliGRAM(s) IV Intermittent every 8 hours PRN Mild Pain (1 - 3), Moderate Pain (4 - 6), Severe Pain (7 - 10)  dextrose 40% Gel 15 Gram(s) Oral once PRN Blood Glucose LESS THAN 70 milliGRAM(s)/deciLiter  glucagon  Injectable 1 milliGRAM(s) IntraMuscular once PRN Glucose <70 milliGRAM(s)/deciLiter  morphine  - Injectable 1 milliGRAM(s) IV Push every 4 hours PRN breakthrough pain  nystatin Powder 1 Application(s) Topical two times a day PRN rash/itchiness      Allergies    ASA; dye contrast (Anaphylaxis)  aspirin (Short breath)  divalproex sodium (Other (Unknown))  Haldol (Other (Unknown))  penicillin (Short breath; Rash)  sulfa drugs (Short breath; Rash)  Xanax (Other (Unknown))    Intolerances            PHYSICAL EXAM:   Vital Signs:  Vital Signs Last 24 Hrs  T(C): 36.8 (15 Sep 2018 04:31), Max: 38.2 (14 Sep 2018 12:15)  T(F): 98.3 (15 Sep 2018 04:31), Max: 100.8 (14 Sep 2018 12:15)  HR: 105 (15 Sep 2018 04:31) (105 - 117)  BP: 114/74 (15 Sep 2018 04:31) (111/65 - 155/87)  BP(mean): --  RR: 18 (15 Sep 2018 04:31) (18 - 18)  SpO2: 97% (15 Sep 2018 04:31) (91% - 100%)  Daily     Daily     GENERAL:  no distress  HEENT:  NC/AT,  anicteric  CHEST:   no increased effort, breath sounds clear  HEART:  Regular rhythm  ABDOMEN:  Soft, non-tender, non-distended, normoactive bowel sounds,  no masses ,no hepato-splenomegaly, no signs of chronic liver disease  EXTEREMITIES:  no cyanosis      LABS:                        7.3    14.34 )-----------( 355      ( 14 Sep 2018 09:38 )             23.6       Phos  4.3     09-15  Mg     1.8     09-15            RADIOLOGY & ADDITIONAL TESTS:

## 2018-09-15 NOTE — PROGRESS NOTE ADULT - SUBJECTIVE AND OBJECTIVE BOX
---___---___---___---___---___---___ ---___---___---___---___---___---___---___---___---___---                    <<<  M E D I C A L   A T T E N D I N G    F O L L O W    U P   N O T E  >>>  pain is much better since on he fentanyl   ---___---___---___---___---___---      <<<  MEDICATIONS:  >>>    MEDICATIONS  (STANDING):  ALBUTerol/ipratropium for Nebulization 3 milliLiter(s) Nebulizer every 6 hours  buDESOnide   0.5 milliGRAM(s) Respule 0.5 milliGRAM(s) Inhalation every 12 hours  cefepime   IVPB      cefepime   IVPB 1000 milliGRAM(s) IV Intermittent every 12 hours  chlorhexidine 4% Liquid 1 Application(s) Topical <User Schedule>  clonazePAM Tablet 2 milliGRAM(s) Oral <User Schedule>  dextrose 5%. 1000 milliLiter(s) (50 mL/Hr) IV Continuous <Continuous>  dextrose 50% Injectable 12.5 Gram(s) IV Push once  dextrose 50% Injectable 25 Gram(s) IV Push once  dextrose 50% Injectable 25 Gram(s) IV Push once  enoxaparin Injectable 40 milliGRAM(s) SubCutaneous daily  ergocalciferol 74791 Unit(s) Oral every week  fentaNYL   Patch  12 MICROgram(s)/Hr 1 Patch Transdermal every 72 hours  insulin lispro (HumaLOG) corrective regimen sliding scale   SubCutaneous every 6 hours  insulin NPH human recombinant 7 Unit(s) SubCutaneous <User Schedule>  lidocaine   Patch 1 Patch Transdermal every 24 hours  mirtazapine 15 milliGRAM(s) Oral <User Schedule>  nystatin    Suspension 756502 Unit(s) Oral four times a day  pantoprazole  Injectable 40 milliGRAM(s) IV Push every 24 hours  predniSONE   Tablet 7.5 milliGRAM(s) Oral <User Schedule>  sertraline 50 milliGRAM(s) Oral daily  vancomycin  IVPB 500 milliGRAM(s) IV Intermittent every 24 hours      MEDICATIONS  (PRN):  acetaminophen  IVPB .. 1000 milliGRAM(s) IV Intermittent every 8 hours PRN Mild Pain (1 - 3), Moderate Pain (4 - 6), Severe Pain (7 - 10)  dextrose 40% Gel 15 Gram(s) Oral once PRN Blood Glucose LESS THAN 70 milliGRAM(s)/deciLiter  glucagon  Injectable 1 milliGRAM(s) IntraMuscular once PRN Glucose <70 milliGRAM(s)/deciLiter  morphine  - Injectable 1 milliGRAM(s) IV Push every 4 hours PRN breakthrough pain  nystatin Powder 1 Application(s) Topical two times a day PRN rash/itchiness       ---___---___---___---___---___---     <<<REVIEW OF SYSTEM: >>>    GEN: no fever, no chills,  pain  RESP: no SOB, no cough, no sputum  CVS: no chest pain, no palpitations, no edema  GI: no abdominal pain, no nausea, no vomiting, no constipation, no diarrhea  : no dysurea, no frequency  NEURO: no headache, no dizziness  PSYCH: no depression, not anxious  Derm : no itching, no rash     ---___---___---___---___---___---          <<<  VITAL SIGNS: >>>    T(F): 98.6 (09-15-18 @ 14:50), Max: 99.7 (09-14-18 @ 20:25)  HR: 112 (09-15-18 @ 14:50) (105 - 112)  BP: 144/75 (09-15-18 @ 14:50) (111/65 - 144/75)  RR: 19 (09-15-18 @ 14:50) (18 - 19)  SpO2: 93% (09-15-18 @ 14:50) (91% - 97%)  Wt(kg): --  CAPILLARY BLOOD GLUCOSE      POCT Blood Glucose.: 143 mg/dL (15 Sep 2018 17:25)    I&O's Summary    14 Sep 2018 07:01  -  15 Sep 2018 07:00  --------------------------------------------------------  IN: 610 mL / OUT: 1075 mL / NET: -465 mL         ---___---___---___---___---___---                       PHYSICAL EXAM:    GEN: A&O X 3 , NAD , comfortable  HEENT: NCAT, PERRL, MMM, no scleral icterus, hearing intact  NECK: Supple, No JVD  CVS: S1S2 , regular , No M/R/G appreciated  PULM: CTA B/L,  no W/R/R appreciated  ABD.: soft. non tender, non distended,  bowel sounds present (+) peg noted   Extrem: intact pulses , no edema noted  Derm: dressing right leg   PSYCH: normal mood, no depression, not anxious     ---___---___---___---___---___---     <<<  LAB AND IMAGING: >>>                          7.1    12.44 )-----------( 353      ( 15 Sep 2018 08:27 )             23.6                 Phos  4.3     09-15  Mg     1.8     09-15                                 [All pertinent / recent available Imaging reports and other labs reviewed]     ---___---___---___---___---___---___ ---___---___---___---___---           <<<  A S S E S S M E N T   A N D   P L A N :  >>>          -GI/DVT Prophylaxis.    --------------------------------------------  Case discussed with   Education given on     >>______________________<<      Deniz Bolden .         phone   [phone]

## 2018-09-16 LAB
CULTURE RESULTS: SIGNIFICANT CHANGE UP
CULTURE RESULTS: SIGNIFICANT CHANGE UP
GLUCOSE BLDC GLUCOMTR-MCNC: 136 MG/DL — HIGH (ref 70–99)
GLUCOSE BLDC GLUCOMTR-MCNC: 140 MG/DL — HIGH (ref 70–99)
GLUCOSE BLDC GLUCOMTR-MCNC: 188 MG/DL — HIGH (ref 70–99)
GLUCOSE BLDC GLUCOMTR-MCNC: 205 MG/DL — HIGH (ref 70–99)
HCT VFR BLD CALC: 26.5 % — LOW (ref 34.5–45)
HGB BLD-MCNC: 8.2 G/DL — LOW (ref 11.5–15.5)
MCHC RBC-ENTMCNC: 30 PG — SIGNIFICANT CHANGE UP (ref 27–34)
MCHC RBC-ENTMCNC: 30.9 GM/DL — LOW (ref 32–36)
MCV RBC AUTO: 97.1 FL — SIGNIFICANT CHANGE UP (ref 80–100)
PLATELET # BLD AUTO: 427 K/UL — HIGH (ref 150–400)
RBC # BLD: 2.73 M/UL — LOW (ref 3.8–5.2)
RBC # FLD: 18 % — HIGH (ref 10.3–14.5)
SPECIMEN SOURCE: SIGNIFICANT CHANGE UP
SPECIMEN SOURCE: SIGNIFICANT CHANGE UP
WBC # BLD: 17.37 K/UL — HIGH (ref 3.8–10.5)
WBC # FLD AUTO: 17.37 K/UL — HIGH (ref 3.8–10.5)

## 2018-09-16 PROCEDURE — 71045 X-RAY EXAM CHEST 1 VIEW: CPT | Mod: 26

## 2018-09-16 RX ORDER — MINOCYCLINE HYDROCHLORIDE 45 MG/1
100 TABLET, EXTENDED RELEASE ORAL
Qty: 0 | Refills: 0 | Status: DISCONTINUED | OUTPATIENT
Start: 2018-09-16 | End: 2018-10-04

## 2018-09-16 RX ADMIN — Medication 3 MILLILITER(S): at 06:37

## 2018-09-16 RX ADMIN — MORPHINE SULFATE 1 MILLIGRAM(S): 50 CAPSULE, EXTENDED RELEASE ORAL at 04:05

## 2018-09-16 RX ADMIN — LIDOCAINE 1 PATCH: 4 CREAM TOPICAL at 21:36

## 2018-09-16 RX ADMIN — MORPHINE SULFATE 1 MILLIGRAM(S): 50 CAPSULE, EXTENDED RELEASE ORAL at 20:43

## 2018-09-16 RX ADMIN — MORPHINE SULFATE 1 MILLIGRAM(S): 50 CAPSULE, EXTENDED RELEASE ORAL at 16:29

## 2018-09-16 RX ADMIN — MINOCYCLINE HYDROCHLORIDE 100 MILLIGRAM(S): 45 TABLET, EXTENDED RELEASE ORAL at 17:45

## 2018-09-16 RX ADMIN — MORPHINE SULFATE 1 MILLIGRAM(S): 50 CAPSULE, EXTENDED RELEASE ORAL at 21:47

## 2018-09-16 RX ADMIN — HUMAN INSULIN 7 UNIT(S): 100 INJECTION, SUSPENSION SUBCUTANEOUS at 00:33

## 2018-09-16 RX ADMIN — Medication 500000 UNIT(S): at 17:34

## 2018-09-16 RX ADMIN — MORPHINE SULFATE 1 MILLIGRAM(S): 50 CAPSULE, EXTENDED RELEASE ORAL at 11:45

## 2018-09-16 RX ADMIN — Medication 0.5 MILLIGRAM(S): at 12:51

## 2018-09-16 RX ADMIN — CHLORHEXIDINE GLUCONATE 1 APPLICATION(S): 213 SOLUTION TOPICAL at 06:32

## 2018-09-16 RX ADMIN — Medication 0.5 MILLIGRAM(S): at 00:12

## 2018-09-16 RX ADMIN — MIRTAZAPINE 15 MILLIGRAM(S): 45 TABLET, ORALLY DISINTEGRATING ORAL at 21:35

## 2018-09-16 RX ADMIN — Medication 500000 UNIT(S): at 06:36

## 2018-09-16 RX ADMIN — ENOXAPARIN SODIUM 40 MILLIGRAM(S): 100 INJECTION SUBCUTANEOUS at 13:16

## 2018-09-16 RX ADMIN — MORPHINE SULFATE 1 MILLIGRAM(S): 50 CAPSULE, EXTENDED RELEASE ORAL at 11:14

## 2018-09-16 RX ADMIN — Medication 3 MILLILITER(S): at 12:23

## 2018-09-16 RX ADMIN — CEFEPIME 100 MILLIGRAM(S): 1 INJECTION, POWDER, FOR SOLUTION INTRAMUSCULAR; INTRAVENOUS at 06:33

## 2018-09-16 RX ADMIN — Medication 1: at 00:31

## 2018-09-16 RX ADMIN — Medication 500000 UNIT(S): at 00:20

## 2018-09-16 RX ADMIN — Medication 3 MILLILITER(S): at 17:34

## 2018-09-16 RX ADMIN — Medication 500000 UNIT(S): at 13:15

## 2018-09-16 RX ADMIN — MORPHINE SULFATE 1 MILLIGRAM(S): 50 CAPSULE, EXTENDED RELEASE ORAL at 04:35

## 2018-09-16 RX ADMIN — Medication 3 MILLILITER(S): at 00:12

## 2018-09-16 RX ADMIN — Medication 2: at 06:38

## 2018-09-16 RX ADMIN — SERTRALINE 50 MILLIGRAM(S): 25 TABLET, FILM COATED ORAL at 13:17

## 2018-09-16 RX ADMIN — NYSTATIN CREAM 1 APPLICATION(S): 100000 CREAM TOPICAL at 06:35

## 2018-09-16 RX ADMIN — PANTOPRAZOLE SODIUM 40 MILLIGRAM(S): 20 TABLET, DELAYED RELEASE ORAL at 13:15

## 2018-09-16 RX ADMIN — CEFEPIME 100 MILLIGRAM(S): 1 INJECTION, POWDER, FOR SOLUTION INTRAMUSCULAR; INTRAVENOUS at 17:44

## 2018-09-16 RX ADMIN — LIDOCAINE 1 PATCH: 4 CREAM TOPICAL at 10:15

## 2018-09-16 RX ADMIN — Medication 7.5 MILLIGRAM(S): at 00:12

## 2018-09-16 NOTE — PROGRESS NOTE ADULT - SUBJECTIVE AND OBJECTIVE BOX
CC: f/u for MRSA rt hip infection    Patient reports: she is largely non verbal. No new events during night    REVIEW OF SYSTEMS:  All other review of systems negative (Comprehensive ROS): limited by condition,tolerating Peg feeds    Antimicrobials Day #  :day 3 cefepime, s/p 42 days of vanco  cefepime   IVPB      cefepime   IVPB 1000 milliGRAM(s) IV Intermittent every 12 hours  nystatin    Suspension 706887 Unit(s) Oral four times a day    Other Medications Reviewed    T(F): 98.3 (09-16-18 @ 04:04), Max: 98.6 (09-15-18 @ 14:50)  HR: 116 (09-16-18 @ 04:04)  BP: 129/70 (09-16-18 @ 04:04)  RR: 18 (09-16-18 @ 04:04)  SpO2: 98% (09-16-18 @ 04:04)  Wt(kg): --    PHYSICAL EXAM:  General: alert, no acute distress, sleeps alot  Eyes:  anicteric, no conjunctival injection, no discharge  Oropharynx: no lesions or injection 	  Neck: supple, without adenopathy  Lungs: clear to auscultation, poor inspiratory effert  Heart: regular rate and rhythm; no murmur, rubs or gallops  Abdomen: soft, nondistended, nontender, without mass or organomegaly  Skin: no lesions  Extremities: no clubbing, cyanosis, or edema  Neurologic: awake, attentive  Rt hip wound with wound vac, no active cellulitis    LAB RESULTS:                        8.2    17.37 )-----------( 427      ( 16 Sep 2018 09:15 )             26.5       Phos  4.3     09-15  Mg     1.8     09-15          MICROBIOLOGY:  RECENT CULTURES:  09-13 @ 18:42 .Blood Blood-Venous     No growth to date.      09-11 @ 15:06 .Urine Catheterized     No growth          RADIOLOGY REVIEWED:    < from: Xray Chest 1 View- PORTABLE-Routine (09.12.18 @ 18:35) >  IMPRESSION:     Left-sided PICC line in place with its distal tip overlying the SVC.   Moderate pulmonary edema.   Bilateral pleural effusions and associated atelectasis.    < end of copied text >

## 2018-09-16 NOTE — PROGRESS NOTE ADULT - SUBJECTIVE AND OBJECTIVE BOX
---___---___---___---___---___---___ ---___---___---___---___---___---___---___---___---___---                    <<<  M E D I C A L   A T T E N D I N G    F O L L O W    U P   N O T E  >>>    remains unchanged feeling better now on fentanyl patch    ---___---___---___---___---___---      <<<  MEDICATIONS:  >>>    MEDICATIONS  (STANDING):  ALBUTerol/ipratropium for Nebulization 3 milliLiter(s) Nebulizer every 6 hours  buDESOnide   0.5 milliGRAM(s) Respule 0.5 milliGRAM(s) Inhalation every 12 hours  cefepime   IVPB      cefepime   IVPB 1000 milliGRAM(s) IV Intermittent every 12 hours  chlorhexidine 4% Liquid 1 Application(s) Topical <User Schedule>  dextrose 5%. 1000 milliLiter(s) (50 mL/Hr) IV Continuous <Continuous>  dextrose 50% Injectable 12.5 Gram(s) IV Push once  dextrose 50% Injectable 25 Gram(s) IV Push once  dextrose 50% Injectable 25 Gram(s) IV Push once  enoxaparin Injectable 40 milliGRAM(s) SubCutaneous daily  ergocalciferol 88807 Unit(s) Oral every week  fentaNYL   Patch  12 MICROgram(s)/Hr 1 Patch Transdermal every 72 hours  insulin lispro (HumaLOG) corrective regimen sliding scale   SubCutaneous every 6 hours  insulin NPH human recombinant 7 Unit(s) SubCutaneous <User Schedule>  lidocaine   Patch 1 Patch Transdermal every 24 hours  minocycline 100 milliGRAM(s) Oral two times a day  mirtazapine 15 milliGRAM(s) Oral <User Schedule>  nystatin    Suspension 040283 Unit(s) Oral four times a day  pantoprazole  Injectable 40 milliGRAM(s) IV Push every 24 hours  predniSONE   Tablet 7.5 milliGRAM(s) Oral <User Schedule>  sertraline 50 milliGRAM(s) Oral daily      MEDICATIONS  (PRN):  acetaminophen  IVPB .. 1000 milliGRAM(s) IV Intermittent every 8 hours PRN Mild Pain (1 - 3), Moderate Pain (4 - 6), Severe Pain (7 - 10)  dextrose 40% Gel 15 Gram(s) Oral once PRN Blood Glucose LESS THAN 70 milliGRAM(s)/deciLiter  glucagon  Injectable 1 milliGRAM(s) IntraMuscular once PRN Glucose <70 milliGRAM(s)/deciLiter  morphine  - Injectable 1 milliGRAM(s) IV Push every 4 hours PRN breakthrough pain  nystatin Powder 1 Application(s) Topical two times a day PRN rash/itchiness       ---___---___---___---___---___---     <<<REVIEW OF SYSTEM: >>>    GEN: no fever, no chills, no pain  RESP: no SOB, no cough, no sputum  CVS: no chest pain, no palpitations, no edema  GI: no abdominal pain, no nausea, no vomiting, no constipation, no diarrhea  : no dysurea, no frequency  NEURO: no headache, no dizziness  PSYCH: no depression, not anxious  Derm : no itching, no rash     ---___---___---___---___---___---          <<<  VITAL SIGNS: >>>    T(F): 98.3 (09-16-18 @ 04:04), Max: 98.5 (09-16-18 @ 00:20)  HR: 116 (09-16-18 @ 04:04) (77 - 116)  BP: 129/70 (09-16-18 @ 04:04) (129/70 - 142/77)  RR: 18 (09-16-18 @ 04:04) (18 - 18)  SpO2: 98% (09-16-18 @ 04:04) (94% - 98%)  Wt(kg): --  CAPILLARY BLOOD GLUCOSE      POCT Blood Glucose.: 136 mg/dL (16 Sep 2018 12:08)    I&O's Summary    15 Sep 2018 07:01  -  16 Sep 2018 07:00  --------------------------------------------------------  IN: 180 mL / OUT: 1000 mL / NET: -820 mL    16 Sep 2018 07:01  -  16 Sep 2018 15:54  --------------------------------------------------------  IN: 0 mL / OUT: 550 mL / NET: -550 mL         ---___---___---___---___---___---                       PHYSICAL EXAM:    GEN: A&O X 3 , NAD , comfortable  HEENT: NCAT, PERRL, MMM, no scleral icterus, hearing intact  NECK: Supple, No JVD  CVS: S1S2 , regular , No M/R/G appreciated  PULM: CTA B/L,  no W/R/R appreciated  ABD.: soft. non tender, non distended,  bowel sounds present pegnoted  Extrem: intact pulses , no edema noted  Derm: No rash or ecchymosis noted  PSYCH: normal mood, no depression, not anxious     ---___---___---___---___---___---     <<<  LAB AND IMAGING: >>>                          8.2    17.37 )-----------( 427      ( 16 Sep 2018 09:15 )             26.5                 Phos  4.3     09-15  Mg     1.8     09-15                                 [All pertinent / recent available Imaging reports and other labs reviewed]     ---___---___---___---___---___---___ ---___---___---___---___---           <<<  A S S E S S M E N T   A N D   P L A N :  >>>          -GI/DVT Prophylaxis.    --------------------------------------------  Case discussed with   Education given on     >>______________________<<      Deniz Bolden .         phone   [phone]

## 2018-09-16 NOTE — PROGRESS NOTE ADULT - ASSESSMENT
anxiety   mrsa sepsis   wound dehiscence  dysphonia   elevated wbc  repeat wbc and pan culture and id if still elevated

## 2018-09-16 NOTE — PROGRESS NOTE ADULT - ASSESSMENT
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 SHe failed washout so had to have leisa and spacer. She has had multiple episodes of subsequent sepsis with respiratory failure and has gotten repeat courses of broad spectrum empiric treatment now off and out of micu.   Recent 9/10 CT of thigh without any collection  GAYATHRI was negative  CXR reviewed, findings old, no obvious signs of pneumonia  Pyuria is a non specific finding with negative urine culture  Leukocytosis may be multifactorial  No obvious explanation for fever but silent aspiration would be possible, may be most likely scenario.  Her temps have moderated on cefepime but her wbc is escalating  Plan  completed 42 days of MRSA therapy, stop vanco, convert to MCN via peg  supportive care  wound care  per PRS  Cefepime started, will observe for response, perhaps limit to 5 days(3/5)  Will extend therapy with minocycline after 6 week course completed given presence of knee hardware and the sustained nature of her bacteremia I think this will be reasonable.   updated at bedside, no clinical evidence of ongoing infection.  Follow wbc count

## 2018-09-17 LAB
ANION GAP SERPL CALC-SCNC: 13 MMOL/L — SIGNIFICANT CHANGE UP (ref 5–17)
BUN SERPL-MCNC: 55 MG/DL — HIGH (ref 7–23)
CALCIUM SERPL-MCNC: 9.9 MG/DL — SIGNIFICANT CHANGE UP (ref 8.4–10.5)
CHLORIDE SERPL-SCNC: 100 MMOL/L — SIGNIFICANT CHANGE UP (ref 96–108)
CO2 SERPL-SCNC: 26 MMOL/L — SIGNIFICANT CHANGE UP (ref 22–31)
CREAT SERPL-MCNC: 0.82 MG/DL — SIGNIFICANT CHANGE UP (ref 0.5–1.3)
GLUCOSE BLDC GLUCOMTR-MCNC: 137 MG/DL — HIGH (ref 70–99)
GLUCOSE BLDC GLUCOMTR-MCNC: 141 MG/DL — HIGH (ref 70–99)
GLUCOSE BLDC GLUCOMTR-MCNC: 186 MG/DL — HIGH (ref 70–99)
GLUCOSE BLDC GLUCOMTR-MCNC: 230 MG/DL — HIGH (ref 70–99)
GLUCOSE SERPL-MCNC: 215 MG/DL — HIGH (ref 70–99)
HCT VFR BLD CALC: 26.1 % — LOW (ref 34.5–45)
HGB BLD-MCNC: 8.2 G/DL — LOW (ref 11.5–15.5)
MCHC RBC-ENTMCNC: 30.1 PG — SIGNIFICANT CHANGE UP (ref 27–34)
MCHC RBC-ENTMCNC: 31.4 GM/DL — LOW (ref 32–36)
MCV RBC AUTO: 96 FL — SIGNIFICANT CHANGE UP (ref 80–100)
PLATELET # BLD AUTO: 381 K/UL — SIGNIFICANT CHANGE UP (ref 150–400)
POTASSIUM SERPL-MCNC: 4.5 MMOL/L — SIGNIFICANT CHANGE UP (ref 3.5–5.3)
POTASSIUM SERPL-SCNC: 4.5 MMOL/L — SIGNIFICANT CHANGE UP (ref 3.5–5.3)
RBC # BLD: 2.72 M/UL — LOW (ref 3.8–5.2)
RBC # FLD: 18.2 % — HIGH (ref 10.3–14.5)
SODIUM SERPL-SCNC: 139 MMOL/L — SIGNIFICANT CHANGE UP (ref 135–145)
WBC # BLD: 12.73 K/UL — HIGH (ref 3.8–10.5)
WBC # FLD AUTO: 12.73 K/UL — HIGH (ref 3.8–10.5)

## 2018-09-17 PROCEDURE — 99232 SBSQ HOSP IP/OBS MODERATE 35: CPT | Mod: GC

## 2018-09-17 PROCEDURE — 72190 X-RAY EXAM OF PELVIS: CPT | Mod: 26

## 2018-09-17 PROCEDURE — 73552 X-RAY EXAM OF FEMUR 2/>: CPT | Mod: 26,RT

## 2018-09-17 PROCEDURE — 93010 ELECTROCARDIOGRAM REPORT: CPT

## 2018-09-17 RX ADMIN — CHLORHEXIDINE GLUCONATE 1 APPLICATION(S): 213 SOLUTION TOPICAL at 05:34

## 2018-09-17 RX ADMIN — MORPHINE SULFATE 1 MILLIGRAM(S): 50 CAPSULE, EXTENDED RELEASE ORAL at 06:11

## 2018-09-17 RX ADMIN — Medication 3 MILLILITER(S): at 17:04

## 2018-09-17 RX ADMIN — Medication 500000 UNIT(S): at 17:04

## 2018-09-17 RX ADMIN — MORPHINE SULFATE 1 MILLIGRAM(S): 50 CAPSULE, EXTENDED RELEASE ORAL at 00:43

## 2018-09-17 RX ADMIN — NYSTATIN CREAM 1 APPLICATION(S): 100000 CREAM TOPICAL at 06:18

## 2018-09-17 RX ADMIN — MORPHINE SULFATE 1 MILLIGRAM(S): 50 CAPSULE, EXTENDED RELEASE ORAL at 15:08

## 2018-09-17 RX ADMIN — Medication 0.5 MILLIGRAM(S): at 00:16

## 2018-09-17 RX ADMIN — MORPHINE SULFATE 1 MILLIGRAM(S): 50 CAPSULE, EXTENDED RELEASE ORAL at 15:25

## 2018-09-17 RX ADMIN — CEFEPIME 100 MILLIGRAM(S): 1 INJECTION, POWDER, FOR SOLUTION INTRAMUSCULAR; INTRAVENOUS at 17:04

## 2018-09-17 RX ADMIN — MINOCYCLINE HYDROCHLORIDE 100 MILLIGRAM(S): 45 TABLET, EXTENDED RELEASE ORAL at 17:04

## 2018-09-17 RX ADMIN — MORPHINE SULFATE 1 MILLIGRAM(S): 50 CAPSULE, EXTENDED RELEASE ORAL at 10:15

## 2018-09-17 RX ADMIN — Medication 500000 UNIT(S): at 00:16

## 2018-09-17 RX ADMIN — Medication 500000 UNIT(S): at 12:24

## 2018-09-17 RX ADMIN — Medication 3 MILLILITER(S): at 05:34

## 2018-09-17 RX ADMIN — Medication 3 MILLILITER(S): at 12:25

## 2018-09-17 RX ADMIN — LIDOCAINE 1 PATCH: 4 CREAM TOPICAL at 08:43

## 2018-09-17 RX ADMIN — MORPHINE SULFATE 1 MILLIGRAM(S): 50 CAPSULE, EXTENDED RELEASE ORAL at 01:00

## 2018-09-17 RX ADMIN — HUMAN INSULIN 7 UNIT(S): 100 INJECTION, SUSPENSION SUBCUTANEOUS at 00:13

## 2018-09-17 RX ADMIN — MORPHINE SULFATE 1 MILLIGRAM(S): 50 CAPSULE, EXTENDED RELEASE ORAL at 19:20

## 2018-09-17 RX ADMIN — Medication 2: at 06:53

## 2018-09-17 RX ADMIN — SERTRALINE 50 MILLIGRAM(S): 25 TABLET, FILM COATED ORAL at 12:25

## 2018-09-17 RX ADMIN — CEFEPIME 100 MILLIGRAM(S): 1 INJECTION, POWDER, FOR SOLUTION INTRAMUSCULAR; INTRAVENOUS at 05:34

## 2018-09-17 RX ADMIN — NYSTATIN CREAM 1 APPLICATION(S): 100000 CREAM TOPICAL at 05:33

## 2018-09-17 RX ADMIN — MORPHINE SULFATE 1 MILLIGRAM(S): 50 CAPSULE, EXTENDED RELEASE ORAL at 05:35

## 2018-09-17 RX ADMIN — MINOCYCLINE HYDROCHLORIDE 100 MILLIGRAM(S): 45 TABLET, EXTENDED RELEASE ORAL at 05:34

## 2018-09-17 RX ADMIN — Medication 3 MILLILITER(S): at 00:16

## 2018-09-17 RX ADMIN — LIDOCAINE 1 PATCH: 4 CREAM TOPICAL at 21:30

## 2018-09-17 RX ADMIN — ENOXAPARIN SODIUM 40 MILLIGRAM(S): 100 INJECTION SUBCUTANEOUS at 12:25

## 2018-09-17 RX ADMIN — FENTANYL CITRATE 1 PATCH: 50 INJECTION INTRAVENOUS at 15:09

## 2018-09-17 RX ADMIN — Medication 0.5 MILLIGRAM(S): at 12:25

## 2018-09-17 RX ADMIN — Medication 1: at 00:11

## 2018-09-17 RX ADMIN — PANTOPRAZOLE SODIUM 40 MILLIGRAM(S): 20 TABLET, DELAYED RELEASE ORAL at 12:40

## 2018-09-17 RX ADMIN — MIRTAZAPINE 15 MILLIGRAM(S): 45 TABLET, ORALLY DISINTEGRATING ORAL at 21:30

## 2018-09-17 RX ADMIN — Medication 500000 UNIT(S): at 06:18

## 2018-09-17 RX ADMIN — Medication 7.5 MILLIGRAM(S): at 00:15

## 2018-09-17 RX ADMIN — MORPHINE SULFATE 1 MILLIGRAM(S): 50 CAPSULE, EXTENDED RELEASE ORAL at 19:50

## 2018-09-17 RX ADMIN — MORPHINE SULFATE 1 MILLIGRAM(S): 50 CAPSULE, EXTENDED RELEASE ORAL at 09:57

## 2018-09-17 RX ADMIN — FENTANYL CITRATE 1 PATCH: 50 INJECTION INTRAVENOUS at 15:14

## 2018-09-17 NOTE — SWALLOW VFSS/MBS ASSESSMENT ADULT - COMMENTS
9/10/18 Per MD f/u:   weakness  pelvic fracture   agitation   early onset dementia   chronic pain   drug induced hyperglycemia   will add 25 mg seroquel at [facility]   decrease prednisone to 5 gm   wbc increasing get urine and blood cultures and cxr  9/17/18   per MD: will monitor cbc, if stable and trending down will consider dc planning for rehab. 9/10/18 Per MD f/u:  weakness; pelvic fracture; agitation; early onset dementia chronic pain; drug induced hyperglycemia; will add 25 mg seroquel at night; decrease prednisone to 5 gm; wbc increasing get urine and blood cultures and cxr.  9/17/18   per MD: will monitor cbc, if stable and trending down will consider dc planning for rehab.  Orthopedic f/u: POD45 s/p R TREVER explant, articulating spacer and distal femur ORIF, L acetabular fx; - Pain control; - NWB RLE, FFWB LLE; - FU PRS recs regarding wound care; - wound vac in place per plastics; - PT/OT; - DVT ppx: Lovenox; - repeat postop imaging 9/17 - ordered; - Will FU imaging and advise if plan changes; - Dispo planning. 8/21/18 FEES attempted although Pt evidenced reduced tolerance to scope as Pt constantly moving; Evaluation limited 2/2 poor visualization due to pt movement, narrow pharynx, and presence of secretions.  Subsequent MBS performed 8/22/18 which revealed:  significant oropharyngeal dysphagia characterized by persistent laryngeal penetration on thin purees and honey thickened liquids with residue noted within the laryngeal vestibule.  Pt throat clearing and cued weak cough were not effective in eliminating penetrated materials. Pt appeared to be fatigued and required encouragement to accept PO trials.  Pt seated semi upright per Patient preference.  Given Pt's weak cough, hypophonic speech and inability to clear penetrated material, additional PO trials were not presented given aspiration risk and Pt's h/o respiratory failure.    9/10/18 Per MD f/u:  weakness; pelvic fracture; agitation; early onset dementia chronic pain; drug induced hyperglycemia; will add 25 mg seroquel at night; decrease prednisone to 5 gm; wbc increasing get urine and blood cultures and cxr.  9/17/18   per MD: will monitor cbc, if stable and trending down will consider dc planning for rehab.  Orthopedic f/u: POD45 s/p R TREVER explant, articulating spacer and distal femur ORIF, L acetabular fx; - Pain control; - NWB RLE, FFWB LLE; - FU PRS recs regarding wound care; - wound vac in place per plastics; - PT/OT; - DVT ppx: Lovenox; - repeat postop imaging 9/17 - ordered; - Will FU imaging and advise if plan changes; - Dispo planning. 8/21/18 FEES attempted although Pt evidenced reduced tolerance to scope as Pt constantly moving; Evaluation limited 2/2 poor visualization due to pt movement, narrow pharynx, and presence of secretions.  Subsequent MBS performed 8/22/18 which revealed:  significant oropharyngeal dysphagia characterized by persistent laryngeal penetration on thin purees and honey thickened liquids with residue noted within the laryngeal vestibule.  Pt throat clearing and cued weak cough were not effective in eliminating penetrated materials. Pt appeared to be fatigued and required encouragement to accept PO trials.  Pt seated semi upright per Patient preference.  Given Pt's weak cough, hypophonic speech and inability to clear penetrated material, additional PO trials were not presented given aspiration risk and Pt's h/o respiratory failure.    9/10/18 Per MD f/u:  weakness; pelvic fracture; agitation; early onset dementia chronic pain; drug induced hyperglycemia; will add 25 mg seroquel at night; decrease prednisone to 5 gm; wbc increasing get urine and blood cultures and cxr.  9/12/18 CXR: IMPRESSION: Left-sided PICC line in place with its distal tip overlying the SVC. Moderate pulmonary edema. Bilateral pleural effusions and associated atelectasis.    9/17/18 per MD: will monitor cbc, if stable and trending down will consider dc planning for rehab.  Orthopedic f/u: POD45 s/p R TREVER explant, articulating spacer and distal femur ORIF, L acetabular fx; - Pain control; - NWB RLE, FFWB LLE; - FU PRS recs regarding wound care; - wound vac in place per plastics; - PT/OT; - DVT ppx: Lovenox; - repeat postop imaging 9/17 - ordered; - Will FU imaging and advise if plan changes; - Dispo planning.

## 2018-09-17 NOTE — PROGRESS NOTE ADULT - PROBLEM SELECTOR PLAN 1
-test BG Q6h  -c/w NPH 7 units SQ  Q daily at 12am w/daily prednisone administration  -c/w humalog low correction scale Q6h  -will need to re-evaluate insulin regimen if taken off tube feeds. If not tolerating feeds, may need to increase surveillance for hypoglycemia and may require D5 infusion

## 2018-09-17 NOTE — SWALLOW VFSS/MBS ASSESSMENT ADULT - DIAGNOSTIC IMPRESSIONS
Pt is not deemed to be a candidate for PO trials/MBS at this time given Pt's medical condition.  Pt is unable to tolerate upright position despite current pain management protocol.  In addition, Pt inconsistently responding to clinicians questions with underlying cognitive deficits considered to be a contributing factor. Pt is not deemed to be a candidate for PO trials/MBS at this time given Pt's overall medical condition.  Pt is unable to tolerate positioning OOB to EMILIANO chair as Pt has not been OOB recently.  Pt also c/o pain despite current pain management protocol.  Pt was transferred back to bed in reclined position therefore MBS is not appropriate at this time.  In addition, Pt inconsistently responding to clinicians questions with underlying cognitive deficits considered to be a contributing factor.

## 2018-09-17 NOTE — PROGRESS NOTE ADULT - ASSESSMENT
She appears calmer with analgesia (Fentanyl). Thus, Seroquel not needed.    Recommend  Continue with current Remeron 15mg qhs (helping with sleep) and sertraline 50mg qd.   Follow QTc and hold Remeron and sertraline if QTc is 500ms or greater.      Irvin Reyes M.D.  Psychiatry  [phone]

## 2018-09-17 NOTE — SWALLOW VFSS/MBS ASSESSMENT ADULT - SLP GENERAL OBSERVATIONS
Pt seen b/s upon SLP receiving phone call from transport as the Pt was unable to tolerate upright position in EMILIANO chair with c/o pain; Pt leaning forward with head in flexed position; Nsg, transporter and Pt's  agreed the Pt should be transferred back to bed.
Pt received in radiology accompanied by ; secured in EMILIANO chair; Pt prefers semi upright position; b/l wrist restraints, per SLP d/w Nsg Mgr prior to Pt transfer to radiology, Nsg would be available to come to radiology to perform check on wrist restraints.

## 2018-09-17 NOTE — SWALLOW VFSS/MBS ASSESSMENT ADULT - ADDITIONAL INFORMATION
Pt was unable to tolerate upright position in EMILIANO chair with c/o pain; Pt leaning forward with head in flexed position and Pt c/o pain; Nsg, transporter and Pt's  agreed the Pt should be transferred back to bed.

## 2018-09-17 NOTE — PROGRESS NOTE ADULT - ASSESSMENT
68y Female POD45 s/p R TREVER explant, articulating spacer and distal femur ORIF, L acetabular fx  - Pain control  - NWB RLE, FFWB LLE  - FU PRS recs regarding wound care  - wound vac in place per plastics  - PT/OT  - DVT ppx: Lovenox  - repeat postop imaging 9/17 - ordered   - Will FU imaging and advise if plan changes  - Dispo planning

## 2018-09-17 NOTE — PROGRESS NOTE ADULT - SUBJECTIVE AND OBJECTIVE BOX
---___---___---___---___---___---___ ---___---___---___---___---___---___---___---___---___---                    <<<  M E D I C A L   A T T E N D I N G    F O L L O W    U P   N O T E  >>>    - remains afebrile today, awaiting lab work back to confirm elevated wbc    ---___---___---___---___---___---      <<<  MEDICATIONS:  >>>    MEDICATIONS  (STANDING):  ALBUTerol/ipratropium for Nebulization 3 milliLiter(s) Nebulizer every 6 hours  buDESOnide   0.5 milliGRAM(s) Respule 0.5 milliGRAM(s) Inhalation every 12 hours  cefepime   IVPB      cefepime   IVPB 1000 milliGRAM(s) IV Intermittent every 12 hours  chlorhexidine 4% Liquid 1 Application(s) Topical <User Schedule>  dextrose 5%. 1000 milliLiter(s) (50 mL/Hr) IV Continuous <Continuous>  dextrose 50% Injectable 12.5 Gram(s) IV Push once  dextrose 50% Injectable 25 Gram(s) IV Push once  dextrose 50% Injectable 25 Gram(s) IV Push once  enoxaparin Injectable 40 milliGRAM(s) SubCutaneous daily  ergocalciferol 94646 Unit(s) Oral every week  fentaNYL   Patch  12 MICROgram(s)/Hr 1 Patch Transdermal every 72 hours  insulin lispro (HumaLOG) corrective regimen sliding scale   SubCutaneous every 6 hours  insulin NPH human recombinant 7 Unit(s) SubCutaneous <User Schedule>  lidocaine   Patch 1 Patch Transdermal every 24 hours  minocycline 100 milliGRAM(s) Oral two times a day  mirtazapine 15 milliGRAM(s) Oral <User Schedule>  nystatin    Suspension [id number] Unit(s) Oral four times a day  pantoprazole  Injectable 40 milliGRAM(s) IV Push every 24 hours  predniSONE   Tablet 7.5 milliGRAM(s) Oral <User Schedule>  sertraline 50 milliGRAM(s) Oral daily      MEDICATIONS  (PRN):  acetaminophen  IVPB .. 1000 milliGRAM(s) IV Intermittent every 8 hours PRN Mild Pain (1 - 3), Moderate Pain (4 - 6), Severe Pain (7 - 10)  dextrose 40% Gel 15 Gram(s) Oral once PRN Blood Glucose LESS THAN 70 milliGRAM(s)/deciLiter  glucagon  Injectable 1 milliGRAM(s) IntraMuscular once PRN Glucose <70 milliGRAM(s)/deciLiter  morphine  - Injectable 1 milliGRAM(s) IV Push every 4 hours PRN breakthrough pain  nystatin Powder 1 Application(s) Topical two times a day PRN rash/itchiness       ---___---___---___---___---___---     <<<REVIEW OF SYSTEM: >>>    GEN: no fever, no chills, no pain  RESP: no SOB, no cough, no sputum  CVS: no chest pain, no palpitations, no edema  GI: no abdominal pain, no nausea, no vomiting, no constipation, no diarrhea  : no dysurea, no frequency  NEURO: no headache, no dizziness  PSYCH: no depression, not anxious  Derm : no itching, no rash     ---___---___---___---___---___---          <<<  VITAL SIGNS: >>>    T(F): 98.6 (09-17-18 @ 05:30), Max: 99.1 (09-17-18 @ 00:08)  HR: 106 (09-17-18 @ 05:30) (106 - 121)  BP: 127/73 (09-17-18 @ 05:30) (114/70 - 150/73)  RR: 20 (09-17-18 @ 05:30) (20 - 20)  SpO2: 95% (09-17-18 @ 05:30) (93% - 95%)  Wt(kg): --  CAPILLARY BLOOD GLUCOSE      POCT Blood Glucose.: 230 mg/dL (17 Sep 2018 06:21)    I&O's Summary    15 Sep 2018 07:01  -  16 Sep 2018 07:00  --------------------------------------------------------  IN: 180 mL / OUT: 1000 mL / NET: -820 mL    16 Sep 2018 07:01  -  17 Sep 2018 06:51  --------------------------------------------------------  IN: 1172 mL / OUT: 1000 mL / NET: 172 mL         ---___---___---___---___---___---                       PHYSICAL EXAM:    GEN: A&O X 2 , NAD , comfortable  HEENT: NCAT, PERRL, MMM, no scleral icterus, hearing intact  NECK: Supple, No JVD  CVS: S1S2 , regular , No M/R/G appreciated  PULM: CTA B/L,  no W/R/R appreciated  ABD.: soft. non tender, non distended,  bowel sounds present peg noted   Extrem: intact pulses , no edema noted  Derm: No rash or ecchymosis noted wound healing right leg, sacral ulcer noted   PSYCH: normal mood, no depression, not anxious     ---___---___---___---___---___---     <<<  LAB AND IMAGING: >>>                          8.2    17.37 )-----------( 427      ( 16 Sep 2018 09:15 )             26.5                 Phos  4.3     09-15  Mg     1.8     09-15                                 [All pertinent / recent available Imaging reports and other labs reviewed]     ---___---___---___---___---___---___ ---___---___---___---___---           <<<  A S S E S S M E N T   A N D   P L A N :  >>>          -GI/DVT Prophylaxis.    --------------------------------------------  Case discussed with   Education given on     >>______________________<<      Deniz Bolden .         phone   [phone]

## 2018-09-17 NOTE — PROGRESS NOTE ADULT - SUBJECTIVE AND OBJECTIVE BOX
MYRIAM WHITE:[id number],   68yFemale followed for:  ASA; dye contrast (Anaphylaxis)  aspirin (Short breath)  divalproex sodium (Other (Unknown))  Haldol (Other (Unknown))  penicillin (Short breath; Rash)  sulfa drugs (Short breath; Rash)  Xanax (Other (Unknown))    PAST MEDICAL & SURGICAL HISTORY:  CVA (cerebral vascular accident)  Fatty pancreas  PNA (pneumonia)  Pulmonary HTN  IGT (impaired glucose tolerance)  Ulcerative colitis  Acid reflux  Anxiety  Depression  Mouth sores  HLD (hyperlipidemia)  Asthma  Humeral head fracture  H/O: hysterectomy  H/O cataract extraction, left  History of knee replacement    FAMILY HISTORY:  Family history of dementia (Grandparent)  Family history of colon cancer (Grandparent)    MEDICATIONS  (STANDING):  ALBUTerol/ipratropium for Nebulization 3 milliLiter(s) Nebulizer every 6 hours  buDESOnide   0.5 milliGRAM(s) Respule 0.5 milliGRAM(s) Inhalation every 12 hours  cefepime   IVPB      cefepime   IVPB 1000 milliGRAM(s) IV Intermittent every 12 hours  chlorhexidine 4% Liquid 1 Application(s) Topical <User Schedule>  dextrose 5%. 1000 milliLiter(s) (50 mL/Hr) IV Continuous <Continuous>  dextrose 50% Injectable 12.5 Gram(s) IV Push once  dextrose 50% Injectable 25 Gram(s) IV Push once  dextrose 50% Injectable 25 Gram(s) IV Push once  enoxaparin Injectable 40 milliGRAM(s) SubCutaneous daily  ergocalciferol 42911 Unit(s) Oral every week  fentaNYL   Patch  12 MICROgram(s)/Hr 1 Patch Transdermal every 72 hours  insulin lispro (HumaLOG) corrective regimen sliding scale   SubCutaneous every 6 hours  insulin NPH human recombinant 7 Unit(s) SubCutaneous <User Schedule>  lidocaine   Patch 1 Patch Transdermal every 24 hours  minocycline 100 milliGRAM(s) Oral two times a day  mirtazapine 15 milliGRAM(s) Oral <User Schedule>  nystatin    Suspension [id number] Unit(s) Oral four times a day  pantoprazole  Injectable 40 milliGRAM(s) IV Push every 24 hours  predniSONE   Tablet 7.5 milliGRAM(s) Oral <User Schedule>  sertraline 50 milliGRAM(s) Oral daily    MEDICATIONS  (PRN):  acetaminophen  IVPB .. 1000 milliGRAM(s) IV Intermittent every 8 hours PRN Mild Pain (1 - 3), Moderate Pain (4 - 6), Severe Pain (7 - 10)  dextrose 40% Gel 15 Gram(s) Oral once PRN Blood Glucose LESS THAN 70 milliGRAM(s)/deciLiter  glucagon  Injectable 1 milliGRAM(s) IntraMuscular once PRN Glucose <70 milliGRAM(s)/deciLiter  morphine  - Injectable 1 milliGRAM(s) IV Push every 4 hours PRN breakthrough pain  nystatin Powder 1 Application(s) Topical two times a day PRN rash/itchiness      Vital Signs Last 24 Hrs  T(C): 37 (17 Sep 2018 05:30), Max: 37.3 (17 Sep 2018 00:08)  T(F): 98.6 (17 Sep 2018 05:30), Max: 99.1 (17 Sep 2018 00:08)  HR: 106 (17 Sep 2018 05:30) (106 - 121)  BP: 127/73 (17 Sep 2018 05:30) (114/70 - 150/73)  BP(mean): --  RR: 20 (17 Sep 2018 05:30) (20 - 20)  SpO2: 95% (17 Sep 2018 05:30) (93% - 95%)  nc/at  s1s2  cta  soft, nt, nd no guarding or rebound  no c/c/e    CBC Full  -  ( 17 Sep 2018 08:02 )  WBC Count : 12.73 K/uL  Hemoglobin : 8.2 g/dL  Hematocrit : 26.1 %  Platelet Count - Automated : 381 K/uL  Mean Cell Volume : 96.0 fl  Mean Cell Hemoglobin : 30.1 pg  Mean Cell Hemoglobin Concentration : 31.4 gm/dL  Auto Neutrophil # : x  Auto Lymphocyte # : x  Auto Monocyte # : x  Auto Eosinophil # : x  Auto Basophil # : x  Auto Neutrophil % : x  Auto Lymphocyte % : x  Auto Monocyte % : x  Auto Eosinophil % : x  Auto Basophil % : x    09-17    139  |  100  |  55<H>  ----------------------------<  215<H>  4.5   |  26  |  0.82    Ca    9.9      17 Sep 2018 06:37

## 2018-09-17 NOTE — SWALLOW VFSS/MBS ASSESSMENT ADULT - RECOMMENDED CONSISTENCY
Continue NPO, with non-oral nutrition/hydration/medications which may be a long term issue.
NPO, with non-oral nutrition/hydration/medications.

## 2018-09-17 NOTE — PROGRESS NOTE ADULT - SUBJECTIVE AND OBJECTIVE BOX
Chief Complaint/Follow-up on:   T2DM    Subjective:  Pt was seen and examined at bedside, pt is poor historian. Pt has no acute complaints.     Interval Hx - 68 year old female w/steroid induced hyperglycemia on chronic prednisone on continuous tube feeds. 's-240 mg/dl over past 24 hours.     ROS   Pt poor historian for detailed ROS    MEDICATIONS  (STANDING):  ALBUTerol/ipratropium for Nebulization 3 milliLiter(s) Nebulizer every 6 hours  buDESOnide   0.5 milliGRAM(s) Respule 0.5 milliGRAM(s) Inhalation every 12 hours  cefepime   IVPB      cefepime   IVPB 1000 milliGRAM(s) IV Intermittent every 12 hours  chlorhexidine 4% Liquid 1 Application(s) Topical <User Schedule>  dextrose 5%. 1000 milliLiter(s) (50 mL/Hr) IV Continuous <Continuous>  dextrose 50% Injectable 12.5 Gram(s) IV Push once  dextrose 50% Injectable 25 Gram(s) IV Push once  dextrose 50% Injectable 25 Gram(s) IV Push once  enoxaparin Injectable 40 milliGRAM(s) SubCutaneous daily  ergocalciferol 80117 Unit(s) Oral every week  fentaNYL   Patch  12 MICROgram(s)/Hr 1 Patch Transdermal every 72 hours  insulin lispro (HumaLOG) corrective regimen sliding scale   SubCutaneous every 6 hours  insulin NPH human recombinant 7 Unit(s) SubCutaneous <User Schedule>  lidocaine   Patch 1 Patch Transdermal every 24 hours  minocycline 100 milliGRAM(s) Oral two times a day  mirtazapine 15 milliGRAM(s) Oral <User Schedule>  nystatin    Suspension 271202 Unit(s) Oral four times a day  pantoprazole  Injectable 40 milliGRAM(s) IV Push every 24 hours  predniSONE   Tablet 7.5 milliGRAM(s) Oral <User Schedule>  sertraline 50 milliGRAM(s) Oral daily    MEDICATIONS  (PRN):  acetaminophen  IVPB .. 1000 milliGRAM(s) IV Intermittent every 8 hours PRN Mild Pain (1 - 3), Moderate Pain (4 - 6), Severe Pain (7 - 10)  dextrose 40% Gel 15 Gram(s) Oral once PRN Blood Glucose LESS THAN 70 milliGRAM(s)/deciLiter  glucagon  Injectable 1 milliGRAM(s) IntraMuscular once PRN Glucose <70 milliGRAM(s)/deciLiter  morphine  - Injectable 1 milliGRAM(s) IV Push every 4 hours PRN breakthrough pain  nystatin Powder 1 Application(s) Topical two times a day PRN rash/itchiness      PHYSICAL EXAM:  VITALS: T(C): 36.9 (09-17-18 @ 13:49)  T(F): 98.5 (09-17-18 @ 13:49), Max: 99.1 (09-17-18 @ 00:08)  HR: 100 (09-17-18 @ 13:49) (100 - 121)  BP: 129/83 (09-17-18 @ 13:49) (114/70 - 150/73)  RR:  (18 - 20)  SpO2:  (93% - 100%)  GENERAL: NAD, well-groomed, well-developed  RESPIRATORY: Clear to auscultation bilaterally; No rales, rhonchi, wheezing, or rubs  CARDIOVASCULAR: Regular rate and rhythm; No murmurs; no peripheral edema  GI: Soft, nontender, non distended, normal bowel sounds      POCT Blood Glucose.: 141 mg/dL (09-17-18 @ 12:34)  POCT Blood Glucose.: 230 mg/dL (09-17-18 @ 06:21)    POCT Blood Glucose.: 186 mg/dL (09-16-18 @ 23:57)  POCT Blood Glucose.: 140 mg/dL (09-16-18 @ 17:23)  POCT Blood Glucose.: 136 mg/dL (09-16-18 @ 12:08)  POCT Blood Glucose.: 205 mg/dL (09-16-18 @ 06:10)  POCT Blood Glucose.: 188 mg/dL (09-16-18 @ 00:15)    POCT Blood Glucose.: 143 mg/dL (09-15-18 @ 17:25)  POCT Blood Glucose.: 145 mg/dL (09-15-18 @ 12:04)  POCT Blood Glucose.: 209 mg/dL (09-15-18 @ 05:55)  POCT Blood Glucose.: 168 mg/dL (09-15-18 @ 00:01)    POCT Blood Glucose.: 176 mg/dL (09-14-18 @ 20:12)  POCT Blood Glucose.: 185 mg/dL (09-14-18 @ 18:53)    09-17    139  |  100  |  55<H>  ----------------------------<  215<H>  4.5   |  26  |  0.82    EGFR if : 85  EGFR if non : 74    Ca    9.9      09-17  Mg     1.8     09-15  Phos  4.3     09-15            Thyroid Function Tests:      Hemoglobin A1C, Whole Blood: 5.5 % [4.0 - 5.6] (07-27-18 @ 09:02) Chief Complaint/Follow-up on: T2DM    Subjective:  Pt was seen and examined at bedside, pt is poor historian. Pt has no acute complaints.     Interval Hx - 68 year old female w/steroid induced hyperglycemia on chronic prednisone on continuous tube feeds. 's-240 mg/dl over past 24 hours.     ROS   Pt poor historian for detailed ROS    MEDICATIONS  (STANDING):  ALBUTerol/ipratropium for Nebulization 3 milliLiter(s) Nebulizer every 6 hours  buDESOnide   0.5 milliGRAM(s) Respule 0.5 milliGRAM(s) Inhalation every 12 hours  cefepime   IVPB      cefepime   IVPB 1000 milliGRAM(s) IV Intermittent every 12 hours  chlorhexidine 4% Liquid 1 Application(s) Topical <User Schedule>  dextrose 5%. 1000 milliLiter(s) (50 mL/Hr) IV Continuous <Continuous>  dextrose 50% Injectable 12.5 Gram(s) IV Push once  dextrose 50% Injectable 25 Gram(s) IV Push once  dextrose 50% Injectable 25 Gram(s) IV Push once  enoxaparin Injectable 40 milliGRAM(s) SubCutaneous daily  ergocalciferol 25843 Unit(s) Oral every week  fentaNYL   Patch  12 MICROgram(s)/Hr 1 Patch Transdermal every 72 hours  insulin lispro (HumaLOG) corrective regimen sliding scale   SubCutaneous every 6 hours  insulin NPH human recombinant 7 Unit(s) SubCutaneous <User Schedule>  lidocaine   Patch 1 Patch Transdermal every 24 hours  minocycline 100 milliGRAM(s) Oral two times a day  mirtazapine 15 milliGRAM(s) Oral <User Schedule>  nystatin    Suspension 412017 Unit(s) Oral four times a day  pantoprazole  Injectable 40 milliGRAM(s) IV Push every 24 hours  predniSONE   Tablet 7.5 milliGRAM(s) Oral <User Schedule>  sertraline 50 milliGRAM(s) Oral daily    MEDICATIONS  (PRN):  acetaminophen  IVPB .. 1000 milliGRAM(s) IV Intermittent every 8 hours PRN Mild Pain (1 - 3), Moderate Pain (4 - 6), Severe Pain (7 - 10)  dextrose 40% Gel 15 Gram(s) Oral once PRN Blood Glucose LESS THAN 70 milliGRAM(s)/deciLiter  glucagon  Injectable 1 milliGRAM(s) IntraMuscular once PRN Glucose <70 milliGRAM(s)/deciLiter  morphine  - Injectable 1 milliGRAM(s) IV Push every 4 hours PRN breakthrough pain  nystatin Powder 1 Application(s) Topical two times a day PRN rash/itchiness      PHYSICAL EXAM:  VITALS: T(C): 36.9 (09-17-18 @ 13:49)  T(F): 98.5 (09-17-18 @ 13:49), Max: 99.1 (09-17-18 @ 00:08)  HR: 100 (09-17-18 @ 13:49) (100 - 121)  BP: 129/83 (09-17-18 @ 13:49) (114/70 - 150/73)  RR:  (18 - 20)  SpO2:  (93% - 100%)  GENERAL: NAD, well-groomed, well-developed  ENT: no injection b/l  RESPIRATORY: Clear to auscultation bilaterally; No rales, rhonchi, wheezing, or rubs  CARDIOVASCULAR: Regular rate and rhythm; No murmurs; no peripheral edema  GI: Soft, nontender, non distended, normal bowel sounds      POCT Blood Glucose.: 141 mg/dL (09-17-18 @ 12:34)  POCT Blood Glucose.: 230 mg/dL (09-17-18 @ 06:21)    POCT Blood Glucose.: 186 mg/dL (09-16-18 @ 23:57)  POCT Blood Glucose.: 140 mg/dL (09-16-18 @ 17:23)  POCT Blood Glucose.: 136 mg/dL (09-16-18 @ 12:08)  POCT Blood Glucose.: 205 mg/dL (09-16-18 @ 06:10)  POCT Blood Glucose.: 188 mg/dL (09-16-18 @ 00:15)    POCT Blood Glucose.: 143 mg/dL (09-15-18 @ 17:25)  POCT Blood Glucose.: 145 mg/dL (09-15-18 @ 12:04)  POCT Blood Glucose.: 209 mg/dL (09-15-18 @ 05:55)  POCT Blood Glucose.: 168 mg/dL (09-15-18 @ 00:01)    POCT Blood Glucose.: 176 mg/dL (09-14-18 @ 20:12)  POCT Blood Glucose.: 185 mg/dL (09-14-18 @ 18:53)    09-17    139  |  100  |  55<H>  ----------------------------<  215<H>  4.5   |  26  |  0.82    EGFR if : 85  EGFR if non : 74    Ca    9.9      09-17  Mg     1.8     09-15  Phos  4.3     09-15            Thyroid Function Tests:      Hemoglobin A1C, Whole Blood: 5.5 % [4.0 - 5.6] (07-27-18 @ 09:02)

## 2018-09-17 NOTE — PROGRESS NOTE ADULT - ASSESSMENT
drug induced hyperglyemia       mrsa sepsis    dysphonia    early onset dementia     pelvic fracture    will monitor cbc . if stable and trending down will consider dc planning for rehab.

## 2018-09-17 NOTE — PROGRESS NOTE ADULT - SUBJECTIVE AND OBJECTIVE BOX
CC: f/u for MRSA  bacteremia , leukocytosis, and fever    Patient reports: no fever x 72 hours, non verbal, too weak to participate in swallowing evaluation    REVIEW OF SYSTEMS:  All other review of systems negative (Comprehensive ROS)    Antimicrobials Day #  :day 4 cefepime, s/p 6 weeks of IV MRSA therapy  cefepime   IVPB      cefepime   IVPB 1000 milliGRAM(s) IV Intermittent every 12 hours  minocycline 100 milliGRAM(s) Oral two times a day  nystatin    Suspension 585392 Unit(s) Oral four times a day    Other Medications Reviewed    T(F): 98.6 (09-17-18 @ 05:30), Max: 99.1 (09-17-18 @ 00:08)  HR: 106 (09-17-18 @ 05:30)  BP: 127/73 (09-17-18 @ 05:30)  RR: 20 (09-17-18 @ 05:30)  SpO2: 95% (09-17-18 @ 05:30)  Wt(kg): --    PHYSICAL EXAM:  General: alert, no acute distress, weak  Eyes:  anicteric, no conjunctival injection, no discharge  Oropharynx: no lesions or injection 	  Neck: supple, without adenopathy  Lungs: clear to auscultation, diminished at bases  Heart: regular rate and rhythm; no murmur, rubs or gallops  Abdomen: soft, nondistended, nontender, without mass or organomegaly  Skin: no lesions  Extremities: no clubbing, cyanosis, or edema.Rt thigh with wound vac  Neurologic: alert, non verbal, attentive    LAB RESULTS:                        8.2    12.73 )-----------( 381      ( 17 Sep 2018 08:02 )             26.1     09-17    139  |  100  |  55<H>  ----------------------------<  215<H>  4.5   |  26  |  0.82    Ca    9.9      17 Sep 2018 06:37          MICROBIOLOGY:  RECENT CULTURES:  09-13 @ 18:42 .Blood Blood-Venous     No growth to date.          RADIOLOGY REVIEWED:    CXR 9/16 poor quality, cannot view upper lung fields

## 2018-09-17 NOTE — PROGRESS NOTE ADULT - SUBJECTIVE AND OBJECTIVE BOX
Events noted. No recent  Seroquel needed as pt. less agitated on Fentanyl. WBC trending down.  says pt. sleeping better in recent nights.       Vital Signs Last 24 Hrs  T(C): 36.9 (17 Sep 2018 13:49), Max: 37.3 (17 Sep 2018 00:08)  T(F): 98.5 (17 Sep 2018 13:49), Max: 99.1 (17 Sep 2018 00:08)  HR: 100 (17 Sep 2018 13:49) (100 - 121)  BP: 129/83 (17 Sep 2018 13:49) (114/70 - 150/73)  BP(mean): --  RR: 18 (17 Sep 2018 13:49) (18 - 20)  SpO2: 100% (17 Sep 2018 13:49) (93% - 100%)                          8.2    12.73 )-----------( 381      ( 17 Sep 2018 08:02 )             26.1       09-17    139  |  100  |  55<H>  ----------------------------<  215<H>  4.5   |  26  |  0.82    Ca    9.9      17 Sep 2018 06:37                MEDICATIONS  (STANDING):  ALBUTerol/ipratropium for Nebulization 3 milliLiter(s) Nebulizer every 6 hours  buDESOnide   0.5 milliGRAM(s) Respule 0.5 milliGRAM(s) Inhalation every 12 hours  cefepime   IVPB      cefepime   IVPB 1000 milliGRAM(s) IV Intermittent every 12 hours  chlorhexidine 4% Liquid 1 Application(s) Topical <User Schedule>  dextrose 5%. 1000 milliLiter(s) (50 mL/Hr) IV Continuous <Continuous>  dextrose 50% Injectable 12.5 Gram(s) IV Push once  dextrose 50% Injectable 25 Gram(s) IV Push once  dextrose 50% Injectable 25 Gram(s) IV Push once  enoxaparin Injectable 40 milliGRAM(s) SubCutaneous daily  ergocalciferol 33589 Unit(s) Oral every week  fentaNYL   Patch  12 MICROgram(s)/Hr 1 Patch Transdermal every 72 hours  insulin lispro (HumaLOG) corrective regimen sliding scale   SubCutaneous every 6 hours  insulin NPH human recombinant 7 Unit(s) SubCutaneous <User Schedule>  lidocaine   Patch 1 Patch Transdermal every 24 hours  minocycline 100 milliGRAM(s) Oral two times a day  mirtazapine 15 milliGRAM(s) Oral <User Schedule>  nystatin    Suspension 335918 Unit(s) Oral four times a day  pantoprazole  Injectable 40 milliGRAM(s) IV Push every 24 hours  predniSONE   Tablet 7.5 milliGRAM(s) Oral <User Schedule>  sertraline 50 milliGRAM(s) Oral daily    MEDICATIONS  (PRN):  acetaminophen  IVPB .. 1000 milliGRAM(s) IV Intermittent every 8 hours PRN Mild Pain (1 - 3), Moderate Pain (4 - 6), Severe Pain (7 - 10)  dextrose 40% Gel 15 Gram(s) Oral once PRN Blood Glucose LESS THAN 70 milliGRAM(s)/deciLiter  glucagon  Injectable 1 milliGRAM(s) IntraMuscular once PRN Glucose <70 milliGRAM(s)/deciLiter  morphine  - Injectable 1 milliGRAM(s) IV Push every 4 hours PRN breakthrough pain  nystatin Powder 1 Application(s) Topical two times a day PRN rash/itchiness      Elderly WF in bed, calm, mute. No tremors nor diaphoresis.

## 2018-09-17 NOTE — SWALLOW VFSS/MBS ASSESSMENT ADULT - SLP PERTINENT HISTORY OF CURRENT PROBLEM
68F pmhx of early onset Alzheimer disease, recent hip fracture from osteoporosis s/p repair last month, anxiety/depression, asthma, HLD and GERD who presents with severe sepsis and acute back pain and LE weakness.  Pt has new onset of LLE weakness and inability to walk and CT shows acute left pelvic fracture with L2 compression fracture. Severe sepsis 2/2 possible infected joint or spinal abscess vs. less likely UTI.  Pt has new onset severe back pain and LLE weakness of sudden onset last night due to pelvic fracture but also with rigors, fevers, chills and significant bandemia, leukocytosis, tachycardia and lactate >2. Vertebral fracture along with pelvic fracture likely cause but in septic setting will also get MRI to r/o spinal abscess. Pt has Alzheimers at baseline but is more confused than normal likely 2/2 underlying sepsis vs. hypoglycemia.  CT head showed no acute new infarction.
68F pmhx of early onset Alzheimer disease, recent hip fracture from osteoporosis s/p repair last month, anxiety/depression, asthma, HLD and GERD who presents with severe sepsis and acute back pain and LE weakness.  Pt has new onset of LLE weakness and inability to walk and CT shows acute left pelvic fracture with L2 compression fracture. Severe sepsis 2/2 possible infected joint or spinal abscess vs. less likely UTI.  Pt has new onset severe back pain and LLE weakness of sudden onset last night due to pelvic fracture but also with rigors, fevers, chills and significant bandemia, leukocytosis, tachycardia and lactate >2. Vertebral fracture along with pelvic fracture likely cause but in septic setting will also get MRI to r/o spinal abscess. Pt has Alzheimers at baseline but is more confused than normal likely 2/2 underlying sepsis vs. hypoglycemia.  CT head showed no acute new infarction.

## 2018-09-17 NOTE — PROGRESS NOTE ADULT - ASSESSMENT
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 SHe failed washout so had to have leisa and spacer. She has had multiple episodes of subsequent sepsis with respiratory failure and has gotten repeat courses of broad spectrum empiric treatment now off and out of micu.   Recent 9/10 CT of thigh without any collection  GAYATHRI was negative  CXR reviewed, findings old, no obvious signs of pneumonia  Pyuria is a non specific finding with negative urine culture  Leukocytosis may be multifactorial  No obvious explanation for recent  fever but silent aspiration would be possible, may be most likely scenario.  Her temps have moderated on cefepime but her wbc has fluctuated  Plan  completed 42 days of MRSA therapy9/15, now on  to [facility] via peg  supportive care  wound care  per PRS  Cefepime started, will observe for response, perhaps limit to 5 days(4/5)  Etiology of fluctuating wbc counts not clear but no correlation with local signs of infection.We may need to follow conservatively  Will extend therapy with minocycline after 6 week course completed given presence of knee hardware and the sustained nature of her bacteremia I think this will be reasonable.   updated at bedside, no clinical evidence of ongoing infection.  Follow wbc count

## 2018-09-17 NOTE — PROGRESS NOTE ADULT - ASSESSMENT
68 year old female w/hyperglycemia (chronic steroid use) on continuous tube feeds here w/weakness and MRSA bacteremia w/wound vac.    AM FS - 230  Plan for MBS today so may need to adjust regimen if can be advanced to PO diet. 68 year old female w/hyperglycemia (chronic steroid use) on continuous tube feeds here w/weakness and MRSA bacteremia w/wound vac.    AM FS - 230  Pt had planned MBS today but was not appropriate candidate for study, will be on tube feedings for now. 68 year old female w/hyperglycemia (chronic steroid use) on continuous tube feeds here w/weakness and MRSA bacteremia w/wound vac.    AM FS - 230, isolated hyperglycemia  Pt had planned MBS today but was not appropriate candidate for study, will be on tube feedings for now.

## 2018-09-17 NOTE — PROGRESS NOTE ADULT - SUBJECTIVE AND OBJECTIVE BOX
Pt seen and examined at bedside resting comfortably. No acute distress. Pain well controlled.    Vital Signs Last 24 Hrs  T(C): 37 (17 Sep 2018 05:30), Max: 37.3 (17 Sep 2018 00:08)  T(F): 98.6 (17 Sep 2018 05:30), Max: 99.1 (17 Sep 2018 00:08)  HR: 106 (17 Sep 2018 05:30) (106 - 121)  BP: 127/73 (17 Sep 2018 05:30) (114/70 - 150/73)  RR: 20 (17 Sep 2018 05:30) (20 - 20)  SpO2: 95% (17 Sep 2018 05:30) (93% - 95%)    O:  Physical exam:  Gen: Alert and Oriented x3, No Acute Distress  EXT:   Incision healing well without signs erythema or discharge           Dressing: wound vacuum in place holding good suction            Motor: wiggles toes            Sensation: SILT            WWP distally

## 2018-09-17 NOTE — SWALLOW VFSS/MBS ASSESSMENT ADULT - SPECIFY REASON(S)
to objectively assess pharyngeal swallow and r/o aspiration. to objectively reassess pharyngeal swallow and r/o aspiration prior to Pt d/c.

## 2018-09-18 LAB
CULTURE RESULTS: SIGNIFICANT CHANGE UP
CULTURE RESULTS: SIGNIFICANT CHANGE UP
GLUCOSE BLDC GLUCOMTR-MCNC: 106 MG/DL — HIGH (ref 70–99)
GLUCOSE BLDC GLUCOMTR-MCNC: 155 MG/DL — HIGH (ref 70–99)
GLUCOSE BLDC GLUCOMTR-MCNC: 174 MG/DL — HIGH (ref 70–99)
GLUCOSE BLDC GLUCOMTR-MCNC: 197 MG/DL — HIGH (ref 70–99)
GLUCOSE BLDC GLUCOMTR-MCNC: 204 MG/DL — HIGH (ref 70–99)
GLUCOSE BLDC GLUCOMTR-MCNC: 213 MG/DL — HIGH (ref 70–99)
HCT VFR BLD CALC: 25.3 % — LOW (ref 34.5–45)
HGB BLD-MCNC: 7.6 G/DL — LOW (ref 11.5–15.5)
MCHC RBC-ENTMCNC: 29 PG — SIGNIFICANT CHANGE UP (ref 27–34)
MCHC RBC-ENTMCNC: 30 GM/DL — LOW (ref 32–36)
MCV RBC AUTO: 96.6 FL — SIGNIFICANT CHANGE UP (ref 80–100)
PLATELET # BLD AUTO: 373 K/UL — SIGNIFICANT CHANGE UP (ref 150–400)
RBC # BLD: 2.62 M/UL — LOW (ref 3.8–5.2)
RBC # FLD: 18.5 % — HIGH (ref 10.3–14.5)
SPECIMEN SOURCE: SIGNIFICANT CHANGE UP
SPECIMEN SOURCE: SIGNIFICANT CHANGE UP
WBC # BLD: 12.97 K/UL — HIGH (ref 3.8–10.5)
WBC # FLD AUTO: 12.97 K/UL — HIGH (ref 3.8–10.5)

## 2018-09-18 PROCEDURE — 99232 SBSQ HOSP IP/OBS MODERATE 35: CPT | Mod: GC

## 2018-09-18 RX ORDER — FERROUS SULFATE 325(65) MG
325 TABLET ORAL
Qty: 0 | Refills: 0 | Status: DISCONTINUED | OUTPATIENT
Start: 2018-09-18 | End: 2018-09-19

## 2018-09-18 RX ORDER — HUMAN INSULIN 100 [IU]/ML
8 INJECTION, SUSPENSION SUBCUTANEOUS
Qty: 0 | Refills: 0 | Status: DISCONTINUED | OUTPATIENT
Start: 2018-09-18 | End: 2018-10-04

## 2018-09-18 RX ORDER — MORPHINE SULFATE 50 MG/1
1 CAPSULE, EXTENDED RELEASE ORAL ONCE
Qty: 0 | Refills: 0 | Status: DISCONTINUED | OUTPATIENT
Start: 2018-09-18 | End: 2018-09-18

## 2018-09-18 RX ORDER — CLONAZEPAM 1 MG
1 TABLET ORAL ONCE
Qty: 0 | Refills: 0 | Status: DISCONTINUED | OUTPATIENT
Start: 2018-09-18 | End: 2018-09-18

## 2018-09-18 RX ORDER — CLONAZEPAM 1 MG
2 TABLET ORAL
Qty: 0 | Refills: 0 | Status: DISCONTINUED | OUTPATIENT
Start: 2018-09-18 | End: 2018-09-25

## 2018-09-18 RX ADMIN — MORPHINE SULFATE 1 MILLIGRAM(S): 50 CAPSULE, EXTENDED RELEASE ORAL at 18:00

## 2018-09-18 RX ADMIN — MORPHINE SULFATE 1 MILLIGRAM(S): 50 CAPSULE, EXTENDED RELEASE ORAL at 17:41

## 2018-09-18 RX ADMIN — Medication 2: at 00:25

## 2018-09-18 RX ADMIN — HUMAN INSULIN 7 UNIT(S): 100 INJECTION, SUSPENSION SUBCUTANEOUS at 00:26

## 2018-09-18 RX ADMIN — Medication 3 MILLILITER(S): at 17:40

## 2018-09-18 RX ADMIN — MORPHINE SULFATE 1 MILLIGRAM(S): 50 CAPSULE, EXTENDED RELEASE ORAL at 05:07

## 2018-09-18 RX ADMIN — Medication 3 MILLILITER(S): at 06:44

## 2018-09-18 RX ADMIN — CHLORHEXIDINE GLUCONATE 1 APPLICATION(S): 213 SOLUTION TOPICAL at 06:47

## 2018-09-18 RX ADMIN — Medication 500000 UNIT(S): at 00:27

## 2018-09-18 RX ADMIN — Medication 3 MILLILITER(S): at 23:51

## 2018-09-18 RX ADMIN — MORPHINE SULFATE 1 MILLIGRAM(S): 50 CAPSULE, EXTENDED RELEASE ORAL at 13:00

## 2018-09-18 RX ADMIN — MORPHINE SULFATE 1 MILLIGRAM(S): 50 CAPSULE, EXTENDED RELEASE ORAL at 14:39

## 2018-09-18 RX ADMIN — MIRTAZAPINE 15 MILLIGRAM(S): 45 TABLET, ORALLY DISINTEGRATING ORAL at 22:46

## 2018-09-18 RX ADMIN — Medication 500000 UNIT(S): at 17:41

## 2018-09-18 RX ADMIN — Medication 1: at 23:56

## 2018-09-18 RX ADMIN — Medication 0.5 MILLIGRAM(S): at 00:29

## 2018-09-18 RX ADMIN — Medication 3 MILLILITER(S): at 12:33

## 2018-09-18 RX ADMIN — LIDOCAINE 1 PATCH: 4 CREAM TOPICAL at 09:30

## 2018-09-18 RX ADMIN — Medication 7.5 MILLIGRAM(S): at 00:28

## 2018-09-18 RX ADMIN — Medication 1: at 12:34

## 2018-09-18 RX ADMIN — LIDOCAINE 1 PATCH: 4 CREAM TOPICAL at 22:50

## 2018-09-18 RX ADMIN — MORPHINE SULFATE 1 MILLIGRAM(S): 50 CAPSULE, EXTENDED RELEASE ORAL at 12:35

## 2018-09-18 RX ADMIN — Medication 0.5 MILLIGRAM(S): at 23:51

## 2018-09-18 RX ADMIN — MORPHINE SULFATE 1 MILLIGRAM(S): 50 CAPSULE, EXTENDED RELEASE ORAL at 04:37

## 2018-09-18 RX ADMIN — Medication 0.5 MILLIGRAM(S): at 12:33

## 2018-09-18 RX ADMIN — Medication 325 MILLIGRAM(S): at 19:02

## 2018-09-18 RX ADMIN — MORPHINE SULFATE 1 MILLIGRAM(S): 50 CAPSULE, EXTENDED RELEASE ORAL at 21:15

## 2018-09-18 RX ADMIN — CEFEPIME 100 MILLIGRAM(S): 1 INJECTION, POWDER, FOR SOLUTION INTRAMUSCULAR; INTRAVENOUS at 06:44

## 2018-09-18 RX ADMIN — PANTOPRAZOLE SODIUM 40 MILLIGRAM(S): 20 TABLET, DELAYED RELEASE ORAL at 12:47

## 2018-09-18 RX ADMIN — MORPHINE SULFATE 1 MILLIGRAM(S): 50 CAPSULE, EXTENDED RELEASE ORAL at 14:50

## 2018-09-18 RX ADMIN — Medication 3 MILLILITER(S): at 00:29

## 2018-09-18 RX ADMIN — MORPHINE SULFATE 1 MILLIGRAM(S): 50 CAPSULE, EXTENDED RELEASE ORAL at 21:45

## 2018-09-18 RX ADMIN — ENOXAPARIN SODIUM 40 MILLIGRAM(S): 100 INJECTION SUBCUTANEOUS at 12:33

## 2018-09-18 RX ADMIN — Medication 500000 UNIT(S): at 12:34

## 2018-09-18 RX ADMIN — Medication 500000 UNIT(S): at 06:48

## 2018-09-18 RX ADMIN — MINOCYCLINE HYDROCHLORIDE 100 MILLIGRAM(S): 45 TABLET, EXTENDED RELEASE ORAL at 06:47

## 2018-09-18 RX ADMIN — Medication 2: at 06:48

## 2018-09-18 RX ADMIN — SERTRALINE 50 MILLIGRAM(S): 25 TABLET, FILM COATED ORAL at 12:34

## 2018-09-18 RX ADMIN — MINOCYCLINE HYDROCHLORIDE 100 MILLIGRAM(S): 45 TABLET, EXTENDED RELEASE ORAL at 17:41

## 2018-09-18 RX ADMIN — Medication 2 MILLIGRAM(S): at 22:49

## 2018-09-18 NOTE — PROGRESS NOTE ADULT - ASSESSMENT
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 SHe failed washout so had to have leisa and spacer. She has had multiple episodes of subsequent sepsis with respiratory failure and has gotten repeat courses of broad spectrum empiric treatment now off and out of micu.   Recent 9/10 CT of thigh without any collection  GAYATHRI was negative  CXR reviewed, findings old, no obvious signs of pneumonia  Pyuria is a non specific finding with negative urine culture  Leukocytosis may be multifactorial, perhaps her prednisone is contributing  No obvious explanation for recent  fever but silent aspiration would be possible, may be most likely scenario.  Her temps have moderated on cefepime but her wbc has fluctuated  Plan  completed 42 days of MRSA therapy9/15, now on  to N via peg  supportive care  wound care  per PRS  Cefepime started somewhat empirically, she has completed 5 days, I have stopped today-9/17  Etiology of fluctuating wbc counts not clear but no correlation with local signs of infection.We may need to follow conservatively  Will extend therapy with minocycline after 6 week course completed given presence of knee hardware and the sustained nature of her bacteremia I think this will be reasonable.   updated at bedside, no clinical evidence of ongoing infection.  Follow wbc count

## 2018-09-18 NOTE — PROGRESS NOTE ADULT - ASSESSMENT
stable from GI standpoint, tolerating feeds at goal, peg site clean, will no longer actively follow, please re-consult if needed

## 2018-09-18 NOTE — PROGRESS NOTE ADULT - ASSESSMENT
68 year old female w/hyperglycemia due to chronic steroid use, on continuous tube feeds, here w/weakness and MRSA bacteremia w/wound vac.

## 2018-09-18 NOTE — PROGRESS NOTE ADULT - ATTENDING COMMENTS
Pt seen and examined. Agree with above with addendum: pt's  notes that she had asthma since she was 5 and has been treated with prednisone 10mg po qdaily for the past 20 years. Her PCP/pulmonary Dr. Vizcaino has been doing her DXA scans and recently treated her with ergocalciferol for 8 weeks.   Marilyn Schultz MD  [phone]

## 2018-09-18 NOTE — PROGRESS NOTE ADULT - SUBJECTIVE AND OBJECTIVE BOX
No acute events overnight. Pain well controlled with pain medications.    Vital Signs Last 24 Hrs  T(C): 36.9 (18 Sep 2018 04:46), Max: 37.5 (17 Sep 2018 17:09)  T(F): 98.4 (18 Sep 2018 04:46), Max: 99.5 (17 Sep 2018 17:09)  HR: 106 (18 Sep 2018 04:46) (100 - 106)  BP: 113/57 (18 Sep 2018 04:46) (113/57 - 150/102)  BP(mean): --  RR: 18 (18 Sep 2018 04:46) (18 - 18)  SpO2: 94% (18 Sep 2018 04:46) (94% - 100%)    Exam:  Gen: NAD  Motor: wiggles toes  Sensory: SILT DP/SP/S/S/T nerve distributions  Vac in place, rest of wound healing well    A/P: 68 year old female s/p I&D, Explant s/p Distal Femur ORIF  - Pain Control  - PT/OT, OOB  - Follow up XRays  - PICC  - Wound Vac per PRS

## 2018-09-18 NOTE — PROGRESS NOTE ADULT - SUBJECTIVE AND OBJECTIVE BOX
Chief Complaint: f/u DM2    History:  Patient currently on tube feeds at 35 cc/hr. Does not have abdominal pain, nausea, vomiting.     MEDICATIONS  (STANDING):  ALBUTerol/ipratropium for Nebulization 3 milliLiter(s) Nebulizer every 6 hours  buDESOnide   0.5 milliGRAM(s) Respule 0.5 milliGRAM(s) Inhalation every 12 hours  cefepime   IVPB      cefepime   IVPB 1000 milliGRAM(s) IV Intermittent every 12 hours  chlorhexidine 4% Liquid 1 Application(s) Topical <User Schedule>  clonazePAM Tablet 2 milliGRAM(s) Oral <User Schedule>  dextrose 5%. 1000 milliLiter(s) (50 mL/Hr) IV Continuous <Continuous>  dextrose 50% Injectable 12.5 Gram(s) IV Push once  dextrose 50% Injectable 25 Gram(s) IV Push once  dextrose 50% Injectable 25 Gram(s) IV Push once  enoxaparin Injectable 40 milliGRAM(s) SubCutaneous daily  ergocalciferol 91287 Unit(s) Oral every week  fentaNYL   Patch  12 MICROgram(s)/Hr 1 Patch Transdermal every 72 hours  ferrous    sulfate 325 milliGRAM(s) Oral two times a day  insulin lispro (HumaLOG) corrective regimen sliding scale   SubCutaneous every 6 hours  insulin NPH human recombinant 7 Unit(s) SubCutaneous <User Schedule>  lidocaine   Patch 1 Patch Transdermal every 24 hours  minocycline 100 milliGRAM(s) Oral two times a day  mirtazapine 15 milliGRAM(s) Oral <User Schedule>  nystatin    Suspension 906471 Unit(s) Oral four times a day  pantoprazole  Injectable 40 milliGRAM(s) IV Push every 24 hours  predniSONE   Tablet 7.5 milliGRAM(s) Oral <User Schedule>  sertraline 50 milliGRAM(s) Oral daily    MEDICATIONS  (PRN):  acetaminophen  IVPB .. 1000 milliGRAM(s) IV Intermittent every 8 hours PRN Mild Pain (1 - 3), Moderate Pain (4 - 6), Severe Pain (7 - 10)  dextrose 40% Gel 15 Gram(s) Oral once PRN Blood Glucose LESS THAN 70 milliGRAM(s)/deciLiter  glucagon  Injectable 1 milliGRAM(s) IntraMuscular once PRN Glucose <70 milliGRAM(s)/deciLiter  morphine  - Injectable 1 milliGRAM(s) IV Push every 4 hours PRN breakthrough pain  nystatin Powder 1 Application(s) Topical two times a day PRN rash/itchiness      Allergies    ASA; dye contrast (Anaphylaxis)  aspirin (Short breath)  divalproex sodium (Other (Unknown))  Haldol (Other (Unknown))  penicillin (Short breath; Rash)  sulfa drugs (Short breath; Rash)  Xanax (Other (Unknown))    PHYSICAL EXAM:  VITALS: T(C): 37 (09-18-18 @ 14:32)  T(F): 98.6 (09-18-18 @ 14:32), Max: 99.5 (09-17-18 @ 17:09)  HR: 60 (09-18-18 @ 14:32) (60 - 106)  BP: 127/82 (09-18-18 @ 14:32) (112/64 - 150/102)  RR:  (18 - 18)  SpO2:  (92% - 97%)  Wt(kg): --  GENERAL: NAD, well-developed  RESPIRATORY: Clear to auscultation bilaterally; No rales, rhonchi, wheezing, or rubs  CARDIOVASCULAR: Regular rate and rhythm; No murmurs  GI: Soft, nontender, non distended    POCT Blood Glucose.: 155 mg/dL (09-18-18 @ 12:04) H 1  POCT Blood Glucose.: 213 mg/dL (09-18-18 @ 06:38) H 2  POCT Blood Glucose.: 204 mg/dL (09-18-18 @ 00:12) NPH 7, H 2  POCT Blood Glucose.: 137 mg/dL (09-17-18 @ 17:24)  POCT Blood Glucose.: 141 mg/dL (09-17-18 @ 12:34)  POCT Blood Glucose.: 230 mg/dL (09-17-18 @ 06:21)  POCT Blood Glucose.: 186 mg/dL (09-16-18 @ 23:57)  POCT Blood Glucose.: 140 mg/dL (09-16-18 @ 17:23)  POCT Blood Glucose.: 136 mg/dL (09-16-18 @ 12:08)  POCT Blood Glucose.: 205 mg/dL (09-16-18 @ 06:10)  POCT Blood Glucose.: 188 mg/dL (09-16-18 @ 00:15)  POCT Blood Glucose.: 143 mg/dL (09-15-18 @ 17:25)      09-17    139  |  100  |  55<H>  ----------------------------<  215<H>  4.5   |  26  |  0.82    EGFR if : 85  EGFR if non : 74    Ca    9.9      09-17        Hemoglobin A1C, Whole Blood: 5.5 % [4.0 - 5.6] (07-27-18 @ 09:02)

## 2018-09-18 NOTE — PROGRESS NOTE ADULT - PROBLEM SELECTOR PLAN 1
-test BG Q6h  - increase NPH to 8 units SQ  Q daily at 12am w/daily prednisone administration  -c/w humalog low correction scale Q6h  -will need to re-evaluate insulin regimen if taken off tube feeds. If not tolerating feeds, may need to increase surveillance for hypoglycemia and may require D5 infusion - test BG Q6h  - increase NPH to 8 units to be given daily at 6 AM. Will change prednisone to be administered at 6 AM as well  -c/w Humalog low correction scale Q6h  - will need to re-evaluate insulin regimen if taken off tube feeds. If not tolerating feeds, may need to increase surveillance for hypoglycemia and may require D5 infusion

## 2018-09-18 NOTE — PROGRESS NOTE ADULT - SUBJECTIVE AND OBJECTIVE BOX
INTERVAL HPI/OVERNIGHT EVENTS:    MEDICATIONS  (STANDING):  ALBUTerol/ipratropium for Nebulization 3 milliLiter(s) Nebulizer every 6 hours  buDESOnide   0.5 milliGRAM(s) Respule 0.5 milliGRAM(s) Inhalation every 12 hours  cefepime   IVPB      cefepime   IVPB 1000 milliGRAM(s) IV Intermittent every 12 hours  chlorhexidine 4% Liquid 1 Application(s) Topical <User Schedule>  clonazePAM Tablet 2 milliGRAM(s) Oral <User Schedule>  clonazePAM Tablet 1 milliGRAM(s) Oral once  dextrose 5%. 1000 milliLiter(s) (50 mL/Hr) IV Continuous <Continuous>  dextrose 50% Injectable 12.5 Gram(s) IV Push once  dextrose 50% Injectable 25 Gram(s) IV Push once  dextrose 50% Injectable 25 Gram(s) IV Push once  enoxaparin Injectable 40 milliGRAM(s) SubCutaneous daily  ergocalciferol 70592 Unit(s) Oral every week  fentaNYL   Patch  12 MICROgram(s)/Hr 1 Patch Transdermal every 72 hours  insulin lispro (HumaLOG) corrective regimen sliding scale   SubCutaneous every 6 hours  insulin NPH human recombinant 7 Unit(s) SubCutaneous <User Schedule>  lidocaine   Patch 1 Patch Transdermal every 24 hours  minocycline 100 milliGRAM(s) Oral two times a day  mirtazapine 15 milliGRAM(s) Oral <User Schedule>  nystatin    Suspension 197564 Unit(s) Oral four times a day  pantoprazole  Injectable 40 milliGRAM(s) IV Push every 24 hours  predniSONE   Tablet 7.5 milliGRAM(s) Oral <User Schedule>  sertraline 50 milliGRAM(s) Oral daily    MEDICATIONS  (PRN):  acetaminophen  IVPB .. 1000 milliGRAM(s) IV Intermittent every 8 hours PRN Mild Pain (1 - 3), Moderate Pain (4 - 6), Severe Pain (7 - 10)  dextrose 40% Gel 15 Gram(s) Oral once PRN Blood Glucose LESS THAN 70 milliGRAM(s)/deciLiter  glucagon  Injectable 1 milliGRAM(s) IntraMuscular once PRN Glucose <70 milliGRAM(s)/deciLiter  morphine  - Injectable 1 milliGRAM(s) IV Push every 4 hours PRN breakthrough pain  nystatin Powder 1 Application(s) Topical two times a day PRN rash/itchiness      Allergies    ASA; dye contrast (Anaphylaxis)  aspirin (Short breath)  divalproex sodium (Other (Unknown))  Haldol (Other (Unknown))  penicillin (Short breath; Rash)  sulfa drugs (Short breath; Rash)  Xanax (Other (Unknown))    Intolerances            PHYSICAL EXAM:   Vital Signs:  Vital Signs Last 24 Hrs  T(C): 36.9 (18 Sep 2018 04:46), Max: 37.5 (17 Sep 2018 17:09)  T(F): 98.4 (18 Sep 2018 04:46), Max: 99.5 (17 Sep 2018 17:09)  HR: 106 (18 Sep 2018 04:46) (100 - 106)  BP: 113/57 (18 Sep 2018 04:46) (113/57 - 150/102)  BP(mean): --  RR: 18 (18 Sep 2018 04:46) (18 - 18)  SpO2: 94% (18 Sep 2018 04:46) (94% - 100%)  Daily     Daily     GENERAL:  no distress  HEENT:  NC/AT,  anicteric  CHEST:   no increased effort, breath sounds clear  HEART:  Regular rhythm  ABDOMEN:  Soft, non-tender, non-distended, normoactive bowel sounds,  peg site clean, tolerating feeds  EXTEREMITIES:  no cyanosis      LABS:                        8.2    12.73 )-----------( 381      ( 17 Sep 2018 08:02 )             26.1     09-17    139  |  100  |  55<H>  ----------------------------<  215<H>  4.5   |  26  |  0.82    Ca    9.9      17 Sep 2018 06:37            RADIOLOGY & ADDITIONAL TESTS:

## 2018-09-18 NOTE — PROGRESS NOTE ADULT - ASSESSMENT
mrsa sepsis     anxiety     wound dehiscence       start dc planning     needs oputptaient mbs when more stable   continue wound care for right hip wound   pain medication right hip fracture and spacer a  and left hip fracture    prednisone for copd     start dc planning for rehab     Dr galloway covering Tuesday night into Thursday morning

## 2018-09-18 NOTE — PROGRESS NOTE ADULT - SUBJECTIVE AND OBJECTIVE BOX
CC: f/u for fever and MRSA Rt hip infection    Patient reports: she is non verbal, appears comfortable out of bed.    REVIEW OF SYSTEMS:  All other review of systems negative (Comprehensive ROS)    Antimicrobials Day #  :s/p 6 weeks of vanco and 5 days of cefepime  minocycline 100 milliGRAM(s) Oral two times a day  nystatin    Suspension 511100 Unit(s) Oral four times a day    Other Medications Reviewed    T(F): 98.6 (09-18-18 @ 14:32), Max: 99.5 (09-17-18 @ 17:09)  HR: 60 (09-18-18 @ 14:32)  BP: 127/82 (09-18-18 @ 14:32)  RR: 18 (09-18-18 @ 14:32)  SpO2: 95% (09-18-18 @ 14:32)  Wt(kg): --    PHYSICAL EXAM:  General: alert, no acute distress  Eyes:  anicteric, no conjunctival injection, no discharge  Oropharynx: no lesions or injection 	  Neck: supple, without adenopathy  Lungs: clear to auscultation, diminished at bases  Heart: regular rate and rhythm; no murmur, rubs or gallops  Abdomen: soft, nondistended, nontender, without mass or organomegaly  Skin: no lesions  Extremities: no clubbing, cyanosis, or edema  Neurologic: alert,non verbal  Rt hip with wound vac    LAB RESULTS:                        7.6    12.97 )-----------( 373      ( 18 Sep 2018 07:56 )             25.3     09-17    139  |  100  |  55<H>  ----------------------------<  215<H>  4.5   |  26  |  0.82    Ca    9.9      17 Sep 2018 06:37          MICROBIOLOGY:  RECENT CULTURES:  09-13 @ 18:42 .Blood Blood-Venous     No growth to date.          RADIOLOGY REVIEWED:  < from: Xray Femur 2 Views, Right (09.17.18 @ 14:24) >  INTERPRETATION:  Clinical information: Follow-up.    2 views of the right femur are compared toCT scan of the right lower   extremity from 9/10/2018 and radiographs from 8/24/2018.    Findings:     Generalized bone demineralization limits osseous evaluation.    Again noted is a cemented right hip arthroplasty with a cerclage wire.   There is along lateral plate and screws transfixing a right distal   femoral shaft periprosthetic fracture. There is 6 mm of separation   between the bone and the lateral plate distally as seen previously. 2   interfragmentary screws are also noted. The patient is status post right   knee arthroplasty unchanged. A wound VAC is noted over the mid to distal   medial soft tissues of the thigh.    Impression: Hardware fixation of a right distal femoral periprosthetic   fracture as above and as seen on 9/10/2018.    < end of copied text >

## 2018-09-18 NOTE — CHART NOTE - NSCHARTNOTEFT_GEN_A_CORE
XRays reviewed with Dr. Rey. Patient may advance to WBAT on Right leg and protected weight bearing on left leg. She should advance to WBAT on the left leg over the next month.

## 2018-09-18 NOTE — CHART NOTE - NSCHARTNOTEFT_GEN_A_CORE
Malnutrition follow up. Enteral feeds tolerated well at goal rate. S/p failed MBS, repeat planned for after discharge.    Chart reviewed events noted.  admitted with Severe sepsis: Rt. hip hemiarthroplasty w/ hematoma ,  aspiration, Back pain : compression fx L2, Left Acetabulum fx, metabolic encephalopathy, Hypoglycemia, Electrolyte imbalance :  on 2%NaCL IV     Source: Patient [ ]    Family [ ]     other [ ]    Diet : NPO with PEG feeds       Enteral /Parenteral Nutrition:  Pivot 1.5 @ 35 ml/hr x 24 hrs  via PEG total 840ml, 1260 kcal ,25 kcal/Kg, 79 grams protein,1.6 grams/Kg, based on dosing weight 50.4kg. free water 638ml   Continue Iron x 2 packets daily via PEG to promote wound healing    Free water 638 ml free water in formula plus 8oz each Iron= Total free water ~1100ml , 22ml/kg based on 50.4kg.            Current Weight: 50.4kg  % Weight Change stable    Pertinent Medications: MEDICATIONS  (STANDING):  ALBUTerol/ipratropium for Nebulization 3 milliLiter(s) Nebulizer every 6 hours  buDESOnide   0.5 milliGRAM(s) Respule 0.5 milliGRAM(s) Inhalation every 12 hours  cefepime   IVPB      cefepime   IVPB 1000 milliGRAM(s) IV Intermittent every 12 hours  chlorhexidine 4% Liquid 1 Application(s) Topical <User Schedule>  clonazePAM Tablet 2 milliGRAM(s) Oral <User Schedule>  clonazePAM Tablet 1 milliGRAM(s) Oral once  dextrose 5%. 1000 milliLiter(s) (50 mL/Hr) IV Continuous <Continuous>  dextrose 50% Injectable 12.5 Gram(s) IV Push once  dextrose 50% Injectable 25 Gram(s) IV Push once  dextrose 50% Injectable 25 Gram(s) IV Push once  enoxaparin Injectable 40 milliGRAM(s) SubCutaneous daily  ergocalciferol 85371 Unit(s) Oral every week  fentaNYL   Patch  12 MICROgram(s)/Hr 1 Patch Transdermal every 72 hours  ferrous    sulfate 325 milliGRAM(s) Oral two times a day  insulin lispro (HumaLOG) corrective regimen sliding scale   SubCutaneous every 6 hours  insulin NPH human recombinant 7 Unit(s) SubCutaneous <User Schedule>  lidocaine   Patch 1 Patch Transdermal every 24 hours  minocycline 100 milliGRAM(s) Oral two times a day  mirtazapine 15 milliGRAM(s) Oral <User Schedule>  nystatin    Suspension 542737 Unit(s) Oral four times a day  pantoprazole  Injectable 40 milliGRAM(s) IV Push every 24 hours  predniSONE   Tablet 7.5 milliGRAM(s) Oral <User Schedule>  sertraline 50 milliGRAM(s) Oral daily    MEDICATIONS  (PRN):  acetaminophen  IVPB .. 1000 milliGRAM(s) IV Intermittent every 8 hours PRN Mild Pain (1 - 3), Moderate Pain (4 - 6), Severe Pain (7 - 10)  dextrose 40% Gel 15 Gram(s) Oral once PRN Blood Glucose LESS THAN 70 milliGRAM(s)/deciLiter  glucagon  Injectable 1 milliGRAM(s) IntraMuscular once PRN Glucose <70 milliGRAM(s)/deciLiter  morphine  - Injectable 1 milliGRAM(s) IV Push every 4 hours PRN breakthrough pain  nystatin Powder 1 Application(s) Topical two times a day PRN rash/itchiness    Pertinent Labs:  09-17 Na139 mmol/L Glu 215 mg/dL<H> K+ 4.5 mmol/L Cr  0.82 mg/dL BUN 55 mg/dL<H> 09-15 Phos 4.3 mg/dL    GI: BM x1  Skin: multiple breakdown sites: maryjane heels DTI, sacrum st II    Estimated Needs:   [X ] no change since previous assessment  [ ] recalculated:       Previous Nutrition Diagnosis: Malnutrition       Nutrition Diagnosis is [X] ongoing, addressed          New Nutrition Diagnosis: [X ] not applicable  Recommendations      [X ]Enteral  Nutrition Support   Pivot 1.5 @ 35 ml/hr x 24 hrs  via PEG total 840ml, 1260 kcal ,25 kcal/Kg, 79 grams protein,1.6 grams/Kg, based on dosing weight 50.4kg. free water 638ml   Continue Iron x 2 packets daily via PEG to promote wound healing    Free water 638 ml free water in formula plus 8oz each Iron= Total free water ~1100ml , 22ml/kg based on 50.4kg.          [ ] Other:        Monitoring and Evaluation:      [ x] Tolerance to diet prescription [X ] weights [ X] follow up per protocol    [ ] other:

## 2018-09-18 NOTE — PROGRESS NOTE ADULT - SUBJECTIVE AND OBJECTIVE BOX
---___---___---___---___---___---___ ---___---___---___---___---___---___---___---___---___---                    <<<  M E D I C A L   A T T E N D I N G    F O L L O W    U P   N O T E  >>>    - Patient seen and examined by me approximately thirty minutes ago.  - In summary, patient is a 68y year old Female who origianlly presented with  mrsa sepsis   - Patient today overall doing ok, comfortable, eating OK.     ---___---___---___---___---___---      <<<  MEDICATIONS:  >>>    MEDICATIONS  (STANDING):  ALBUTerol/ipratropium for Nebulization 3 milliLiter(s) Nebulizer every 6 hours  buDESOnide   0.5 milliGRAM(s) Respule 0.5 milliGRAM(s) Inhalation every 12 hours  cefepime   IVPB      cefepime   IVPB 1000 milliGRAM(s) IV Intermittent every 12 hours  chlorhexidine 4% Liquid 1 Application(s) Topical <User Schedule>  clonazePAM Tablet 2 milliGRAM(s) Oral <User Schedule>  clonazePAM Tablet 1 milliGRAM(s) Oral once  dextrose 5%. 1000 milliLiter(s) (50 mL/Hr) IV Continuous <Continuous>  dextrose 50% Injectable 12.5 Gram(s) IV Push once  dextrose 50% Injectable 25 Gram(s) IV Push once  dextrose 50% Injectable 25 Gram(s) IV Push once  enoxaparin Injectable 40 milliGRAM(s) SubCutaneous daily  ergocalciferol 65285 Unit(s) Oral every week  fentaNYL   Patch  12 MICROgram(s)/Hr 1 Patch Transdermal every 72 hours  ferrous    sulfate 325 milliGRAM(s) Oral two times a day  insulin lispro (HumaLOG) corrective regimen sliding scale   SubCutaneous every 6 hours  insulin NPH human recombinant 7 Unit(s) SubCutaneous <User Schedule>  lidocaine   Patch 1 Patch Transdermal every 24 hours  minocycline 100 milliGRAM(s) Oral two times a day  mirtazapine 15 milliGRAM(s) Oral <User Schedule>  nystatin    Suspension 297052 Unit(s) Oral four times a day  pantoprazole  Injectable 40 milliGRAM(s) IV Push every 24 hours  predniSONE   Tablet 7.5 milliGRAM(s) Oral <User Schedule>  sertraline 50 milliGRAM(s) Oral daily      MEDICATIONS  (PRN):  acetaminophen  IVPB .. 1000 milliGRAM(s) IV Intermittent every 8 hours PRN Mild Pain (1 - 3), Moderate Pain (4 - 6), Severe Pain (7 - 10)  dextrose 40% Gel 15 Gram(s) Oral once PRN Blood Glucose LESS THAN 70 milliGRAM(s)/deciLiter  glucagon  Injectable 1 milliGRAM(s) IntraMuscular once PRN Glucose <70 milliGRAM(s)/deciLiter  morphine  - Injectable 1 milliGRAM(s) IV Push every 4 hours PRN breakthrough pain  nystatin Powder 1 Application(s) Topical two times a day PRN rash/itchiness       ---___---___---___---___---___---     <<<REVIEW OF SYSTEM: >>>    GEN: no fever, no chills, no pain  RESP: no SOB, no cough, no sputum  CVS: no chest pain, no palpitations, no edema  GI: no abdominal pain, no nausea, no vomiting, no constipation, no diarrhea  : no dysurea, no frequency  NEURO: no headache, no dizziness  PSYCH: no depression, not anxious  Derm : no itching, no rash     ---___---___---___---___---___---          <<<  VITAL SIGNS: >>>    T(F): 98.1 (09-18-18 @ 08:58), Max: 99.5 (09-17-18 @ 17:09)  HR: 101 (09-18-18 @ 08:58) (100 - 106)  BP: 112/64 (09-18-18 @ 08:58) (112/64 - 150/102)  RR: 18 (09-18-18 @ 08:58) (18 - 18)  SpO2: 92% (09-18-18 @ 08:58) (92% - 100%)  Wt(kg): --  CAPILLARY BLOOD GLUCOSE      POCT Blood Glucose.: 213 mg/dL (18 Sep 2018 06:38)    I&O's Summary    17 Sep 2018 07:01  -  18 Sep 2018 07:00  --------------------------------------------------------  IN: 1252 mL / OUT: 1201 mL / NET: 51 mL         ---___---___---___---___---___---                       PHYSICAL EXAM:    GEN: A&O X 2, NAD , comfortable  HEENT: NCAT, PERRL, MMM, no scleral icterus, hearing intact  NECK: Supple, No JVD  CVS: S1S2 , regular , No M/R/G appreciated  PULM: CTA B/L,  no W/R/R appreciated  ABD.: soft. non tender, non distended,  bowel sounds present (+) peg   Extrem: intact pulses , no edema noted  Derm: No rash or ecchymosis noted  PSYCH: normal mood, no depression, not anxious     ---___---___---___---___---___---     <<<  LAB AND IMAGING: >>>                          7.6    12.97 )-----------( 373      ( 18 Sep 2018 07:56 )             25.3               09-17    139  |  100  |  55<H>  ----------------------------<  215<H>  4.5   |  26  |  0.82    Ca    9.9      17 Sep 2018 06:37                                 [All pertinent / recent available Imaging reports and other labs reviewed]     ---___---___---___---___---___---___ ---___---___---___---___---           <<<  A S S E S S M E N T   A N D   P L A N :  >>>          -GI/DVT Prophylaxis.    --------------------------------------------  Case discussed with  mr roque  Education given on     >>______________________<<      Deniz Bloden .         phone   [phone]

## 2018-09-19 LAB
ANION GAP SERPL CALC-SCNC: 13 MMOL/L — SIGNIFICANT CHANGE UP (ref 5–17)
ANION GAP SERPL CALC-SCNC: 15 MMOL/L — SIGNIFICANT CHANGE UP (ref 5–17)
APPEARANCE UR: ABNORMAL
BILIRUB UR-MCNC: NEGATIVE — SIGNIFICANT CHANGE UP
BUN SERPL-MCNC: 57 MG/DL — HIGH (ref 7–23)
BUN SERPL-MCNC: 58 MG/DL — HIGH (ref 7–23)
CALCIUM SERPL-MCNC: 9.6 MG/DL — SIGNIFICANT CHANGE UP (ref 8.4–10.5)
CALCIUM SERPL-MCNC: 9.7 MG/DL — SIGNIFICANT CHANGE UP (ref 8.4–10.5)
CHLORIDE SERPL-SCNC: 105 MMOL/L — SIGNIFICANT CHANGE UP (ref 96–108)
CHLORIDE SERPL-SCNC: 105 MMOL/L — SIGNIFICANT CHANGE UP (ref 96–108)
CO2 SERPL-SCNC: 24 MMOL/L — SIGNIFICANT CHANGE UP (ref 22–31)
CO2 SERPL-SCNC: 26 MMOL/L — SIGNIFICANT CHANGE UP (ref 22–31)
COLOR SPEC: YELLOW — SIGNIFICANT CHANGE UP
CREAT SERPL-MCNC: 0.79 MG/DL — SIGNIFICANT CHANGE UP (ref 0.5–1.3)
CREAT SERPL-MCNC: 0.82 MG/DL — SIGNIFICANT CHANGE UP (ref 0.5–1.3)
DIFF PNL FLD: ABNORMAL
GLUCOSE BLDC GLUCOMTR-MCNC: 124 MG/DL — HIGH (ref 70–99)
GLUCOSE BLDC GLUCOMTR-MCNC: 143 MG/DL — HIGH (ref 70–99)
GLUCOSE BLDC GLUCOMTR-MCNC: 154 MG/DL — HIGH (ref 70–99)
GLUCOSE BLDC GLUCOMTR-MCNC: 175 MG/DL — HIGH (ref 70–99)
GLUCOSE BLDC GLUCOMTR-MCNC: 219 MG/DL — HIGH (ref 70–99)
GLUCOSE SERPL-MCNC: 171 MG/DL — HIGH (ref 70–99)
GLUCOSE SERPL-MCNC: 172 MG/DL — HIGH (ref 70–99)
GLUCOSE UR QL: NEGATIVE MG/DL — SIGNIFICANT CHANGE UP
HCT VFR BLD CALC: 20.6 % — CRITICAL LOW (ref 34.5–45)
HGB BLD-MCNC: 7.6 G/DL — LOW (ref 11.5–15.5)
KETONES UR-MCNC: NEGATIVE — SIGNIFICANT CHANGE UP
LACTATE SERPL-SCNC: 1.8 MMOL/L — SIGNIFICANT CHANGE UP (ref 0.7–2)
LEUKOCYTE ESTERASE UR-ACNC: ABNORMAL
MCHC RBC-ENTMCNC: 34.4 PG — HIGH (ref 27–34)
MCHC RBC-ENTMCNC: 36.6 GM/DL — HIGH (ref 32–36)
MCV RBC AUTO: 94.1 FL — SIGNIFICANT CHANGE UP (ref 80–100)
NITRITE UR-MCNC: NEGATIVE — SIGNIFICANT CHANGE UP
PH UR: 6 — SIGNIFICANT CHANGE UP (ref 5–8)
PLATELET # BLD AUTO: 365 K/UL — SIGNIFICANT CHANGE UP (ref 150–400)
POTASSIUM SERPL-MCNC: 3.9 MMOL/L — SIGNIFICANT CHANGE UP (ref 3.5–5.3)
POTASSIUM SERPL-MCNC: 3.9 MMOL/L — SIGNIFICANT CHANGE UP (ref 3.5–5.3)
POTASSIUM SERPL-SCNC: 3.9 MMOL/L — SIGNIFICANT CHANGE UP (ref 3.5–5.3)
POTASSIUM SERPL-SCNC: 3.9 MMOL/L — SIGNIFICANT CHANGE UP (ref 3.5–5.3)
PROCALCITONIN SERPL-MCNC: 0.65 NG/ML — HIGH (ref 0.02–0.1)
PROT UR-MCNC: 100 MG/DL
RAPID RVP RESULT: SIGNIFICANT CHANGE UP
RBC # BLD: 2.19 M/UL — LOW (ref 3.8–5.2)
RBC # FLD: 15.8 % — HIGH (ref 10.3–14.5)
SODIUM SERPL-SCNC: 144 MMOL/L — SIGNIFICANT CHANGE UP (ref 135–145)
SODIUM SERPL-SCNC: 144 MMOL/L — SIGNIFICANT CHANGE UP (ref 135–145)
SP GR SPEC: 1.02 — SIGNIFICANT CHANGE UP (ref 1.01–1.02)
UROBILINOGEN FLD QL: NEGATIVE MG/DL — SIGNIFICANT CHANGE UP
WBC # BLD: 11.2 K/UL — HIGH (ref 3.8–10.5)
WBC # FLD AUTO: 11.2 K/UL — HIGH (ref 3.8–10.5)

## 2018-09-19 PROCEDURE — 99232 SBSQ HOSP IP/OBS MODERATE 35: CPT

## 2018-09-19 RX ORDER — ERGOCALCIFEROL 1.25 MG/1
8000 CAPSULE ORAL DAILY
Qty: 0 | Refills: 0 | Status: DISCONTINUED | OUTPATIENT
Start: 2018-09-19 | End: 2018-10-04

## 2018-09-19 RX ORDER — SODIUM CHLORIDE 9 MG/ML
500 INJECTION INTRAMUSCULAR; INTRAVENOUS; SUBCUTANEOUS ONCE
Qty: 0 | Refills: 0 | Status: COMPLETED | OUTPATIENT
Start: 2018-09-19 | End: 2018-09-19

## 2018-09-19 RX ORDER — MUPIROCIN 20 MG/G
1 OINTMENT TOPICAL
Qty: 0 | Refills: 0 | Status: DISCONTINUED | OUTPATIENT
Start: 2018-09-19 | End: 2018-09-19

## 2018-09-19 RX ORDER — ACETAMINOPHEN 500 MG
1000 TABLET ORAL ONCE
Qty: 0 | Refills: 0 | Status: COMPLETED | OUTPATIENT
Start: 2018-09-19 | End: 2018-09-19

## 2018-09-19 RX ORDER — MUPIROCIN 20 MG/G
1 OINTMENT TOPICAL
Qty: 0 | Refills: 0 | Status: DISCONTINUED | OUTPATIENT
Start: 2018-09-19 | End: 2018-10-04

## 2018-09-19 RX ORDER — FERROUS SULFATE 325(65) MG
300 TABLET ORAL DAILY
Qty: 0 | Refills: 0 | Status: DISCONTINUED | OUTPATIENT
Start: 2018-09-19 | End: 2018-09-19

## 2018-09-19 RX ORDER — FERROUS SULFATE 325(65) MG
300 TABLET ORAL DAILY
Qty: 0 | Refills: 0 | Status: DISCONTINUED | OUTPATIENT
Start: 2018-09-19 | End: 2018-10-04

## 2018-09-19 RX ADMIN — ENOXAPARIN SODIUM 40 MILLIGRAM(S): 100 INJECTION SUBCUTANEOUS at 12:11

## 2018-09-19 RX ADMIN — MORPHINE SULFATE 1 MILLIGRAM(S): 50 CAPSULE, EXTENDED RELEASE ORAL at 23:48

## 2018-09-19 RX ADMIN — Medication 500000 UNIT(S): at 17:20

## 2018-09-19 RX ADMIN — MUPIROCIN 1 APPLICATION(S): 20 OINTMENT TOPICAL at 23:49

## 2018-09-19 RX ADMIN — Medication 7.5 MILLIGRAM(S): at 06:42

## 2018-09-19 RX ADMIN — LIDOCAINE 1 PATCH: 4 CREAM TOPICAL at 12:08

## 2018-09-19 RX ADMIN — Medication 400 MILLIGRAM(S): at 06:53

## 2018-09-19 RX ADMIN — Medication 500000 UNIT(S): at 12:11

## 2018-09-19 RX ADMIN — Medication 3 MILLILITER(S): at 17:21

## 2018-09-19 RX ADMIN — MIRTAZAPINE 15 MILLIGRAM(S): 45 TABLET, ORALLY DISINTEGRATING ORAL at 22:21

## 2018-09-19 RX ADMIN — Medication 3 MILLILITER(S): at 23:49

## 2018-09-19 RX ADMIN — Medication 0.5 MILLIGRAM(S): at 23:49

## 2018-09-19 RX ADMIN — PANTOPRAZOLE SODIUM 40 MILLIGRAM(S): 20 TABLET, DELAYED RELEASE ORAL at 12:11

## 2018-09-19 RX ADMIN — Medication 1000 MILLIGRAM(S): at 07:23

## 2018-09-19 RX ADMIN — CHLORHEXIDINE GLUCONATE 1 APPLICATION(S): 213 SOLUTION TOPICAL at 06:40

## 2018-09-19 RX ADMIN — Medication 325 MILLIGRAM(S): at 06:42

## 2018-09-19 RX ADMIN — SODIUM CHLORIDE 1000 MILLILITER(S): 9 INJECTION INTRAMUSCULAR; INTRAVENOUS; SUBCUTANEOUS at 06:53

## 2018-09-19 RX ADMIN — MORPHINE SULFATE 1 MILLIGRAM(S): 50 CAPSULE, EXTENDED RELEASE ORAL at 15:24

## 2018-09-19 RX ADMIN — Medication 500000 UNIT(S): at 06:42

## 2018-09-19 RX ADMIN — HUMAN INSULIN 8 UNIT(S): 100 INJECTION, SUSPENSION SUBCUTANEOUS at 06:43

## 2018-09-19 RX ADMIN — Medication 2 MILLIGRAM(S): at 22:21

## 2018-09-19 RX ADMIN — Medication 1: at 12:15

## 2018-09-19 RX ADMIN — MINOCYCLINE HYDROCHLORIDE 100 MILLIGRAM(S): 45 TABLET, EXTENDED RELEASE ORAL at 06:42

## 2018-09-19 RX ADMIN — Medication 2: at 06:43

## 2018-09-19 RX ADMIN — Medication 1: at 17:22

## 2018-09-19 RX ADMIN — ERGOCALCIFEROL 8000 UNIT(S): 1.25 CAPSULE ORAL at 17:23

## 2018-09-19 RX ADMIN — Medication 500000 UNIT(S): at 23:49

## 2018-09-19 RX ADMIN — MORPHINE SULFATE 1 MILLIGRAM(S): 50 CAPSULE, EXTENDED RELEASE ORAL at 15:50

## 2018-09-19 RX ADMIN — SERTRALINE 50 MILLIGRAM(S): 25 TABLET, FILM COATED ORAL at 12:12

## 2018-09-19 RX ADMIN — MINOCYCLINE HYDROCHLORIDE 100 MILLIGRAM(S): 45 TABLET, EXTENDED RELEASE ORAL at 17:20

## 2018-09-19 RX ADMIN — Medication 0.5 MILLIGRAM(S): at 12:10

## 2018-09-19 RX ADMIN — Medication 300 MILLIGRAM(S): at 17:20

## 2018-09-19 RX ADMIN — Medication 300 MILLIGRAM(S): at 19:26

## 2018-09-19 RX ADMIN — LIDOCAINE 1 PATCH: 4 CREAM TOPICAL at 22:22

## 2018-09-19 RX ADMIN — Medication 3 MILLILITER(S): at 12:10

## 2018-09-19 RX ADMIN — Medication 3 MILLILITER(S): at 06:43

## 2018-09-19 RX ADMIN — Medication 0: at 23:53

## 2018-09-19 NOTE — PROGRESS NOTE ADULT - SUBJECTIVE AND OBJECTIVE BOX
CC: f/u for fever and MRSA Rt hip infection    Patient arouses easily, no distress, tolerating tube feeds, more alert and responsive c/w my last exam    REVIEW OF SYSTEMS:  All other review of systems negative (Comprehensive ROS)    Antimicrobials: s/p 6 weeks vanco and 5 days of cefepime  minocycline 100 milliGRAM(s) Oral two times a day  nystatin    Suspension 216607 Unit(s) Oral four times a day    Other Medications Reviewed    Vital Signs Last 24 Hrs  T(F): 98.6 (19 Sep 2018 12:10), Max: 101 (19 Sep 2018 06:42)  HR: 94 (19 Sep 2018 12:10) (94 - 101)  BP: 106/63 (19 Sep 2018 12:10) (86/50 - 149/87)  BP(mean): --  RR: 18 (19 Sep 2018 12:10) (18 - 18)  SpO2: 98% (19 Sep 2018 12:10) (92% - 98%)    PHYSICAL EXAM:  General: no acute distress  Eyes:  anicteric, no conjunctival injection, no discharge  Oropharynx: no lesions or injection 	  Neck: supple, without adenopathy  Lungs: clear to auscultation, diminished at bases  Heart: regular rate and rhythm; no murmur, rubs or gallops  Abdomen: soft, nondistended, nontender, G tube site clean  gavin  Skin: no rash  Extremities: R thigh VAC in place, no edema  Neurologic: alert, mostly nonverbal    LAB RESULTS:                        7.6    11.2  )-----------( 365      ( 19 Sep 2018 07:06 )             20.6   09-19    144  |  105  |  57<H>  ----------------------------<  172<H>  3.9   |  24  |  0.79    Ca    9.6      19 Sep 2018 07:06      MICROBIOLOGY:  RECENT CULTURES:  09-13 @ 18:42 .Blood Blood-Venous     No growth to date.      RADIOLOGY REVIEWED:  Xray Chest 1 View- PORTABLE-Routine (09.16.18 @ 16:44) >  Limited evaluation due to radiographic technique and patient positioning.  The patient's chin obscures the upper lungs and chest. No pneumothorax.

## 2018-09-19 NOTE — PROGRESS NOTE ADULT - ASSESSMENT
Problem/Plan - 1:  ·  Problem: Hip fracture, left, sequela.  Plan: supportive care  pain meds.      Problem/Plan - 2:  ·  Problem: Hyperglycemia, drug-induced.  Plan: Endo helping . continue insulin.      Problem/Plan - 3:  ·  Problem: Sepsis due to methicillin susceptible Staphylococcus aureus.  Plan: Spiked again today . ID following and Abxs per him .  wbc improving restaart po iron.      Problem/Plan - 4:  ·  Problem: Wound dehiscence.  Plan: wound healing well.      Problem/Plan - 5:  ·  Problem: Hypotension .  Plan: Responded to IVF .     Problem/Plan - 6:  ·  Problem: Anxiety.  Plan: continue current meds.

## 2018-09-19 NOTE — PROGRESS NOTE ADULT - ASSESSMENT
67 yo female with dementia, history of UC, and s/p R Hip hemiarthroplasty 6/22/18, post op hematoma  Admitted after left hip fx, noted severe sepsis, MRSA bacteremia, infected R hip- s/p GABRIEL and spacer  Stormy post op course with subsequent episodes of suspected sepsis- unclear source, course of Cefepime  S/p PEG    9/10 CT of thigh without any collection  GAYATHRI negative  CXR reviewed, no obvious signs of pneumonia  Isolated temp 101 today  Leukocytosis moderating, latest Bld Cxs negative  No obvious explanation for intermittent fever  ?silent aspiration possible    Plan  Completed 42 days of IV MRSA therapy 9/15, continue MCN via PEG  Wound care per PRS  Follow temps and CBC/diff   Follow conservatively off additional empiric Tx  D/w daughter at bedside

## 2018-09-19 NOTE — PROGRESS NOTE ADULT - SUBJECTIVE AND OBJECTIVE BOX
INTERVAL HPI/OVERNIGHT EVENTS: Seen and examined with daughter in room . Had episode of Hypotension and spiked temp.    Vital Signs Last 24 Hrs  T(C): 36.6 (19 Sep 2018 20:48), Max: 38.3 (19 Sep 2018 06:42)  T(F): 97.9 (19 Sep 2018 20:48), Max: 101 (19 Sep 2018 06:42)  HR: 103 (19 Sep 2018 20:48) (92 - 140)  BP: 119/67 (19 Sep 2018 20:48) (86/50 - 133/76)  BP(mean): --  RR: 18 (19 Sep 2018 20:48) (18 - 18)  SpO2: 99% (19 Sep 2018 20:48) (92% - 100%)  I&O's Summary    18 Sep 2018 07:01  -  19 Sep 2018 07:00  --------------------------------------------------------  IN: 1780 mL / OUT: 1050 mL / NET: 730 mL    19 Sep 2018 07:01  -  19 Sep 2018 22:14  --------------------------------------------------------  IN: 620 mL / OUT: 650 mL / NET: -30 mL      MEDICATIONS  (STANDING):  ALBUTerol/ipratropium for Nebulization 3 milliLiter(s) Nebulizer every 6 hours  buDESOnide   0.5 milliGRAM(s) Respule 0.5 milliGRAM(s) Inhalation every 12 hours  chlorhexidine 4% Liquid 1 Application(s) Topical <User Schedule>  clonazePAM Tablet 2 milliGRAM(s) Oral <User Schedule>  dextrose 5%. 1000 milliLiter(s) (50 mL/Hr) IV Continuous <Continuous>  dextrose 50% Injectable 12.5 Gram(s) IV Push once  dextrose 50% Injectable 25 Gram(s) IV Push once  dextrose 50% Injectable 25 Gram(s) IV Push once  enoxaparin Injectable 40 milliGRAM(s) SubCutaneous daily  ergocalciferol Drops 8000 Unit(s) Oral daily  fentaNYL   Patch  12 MICROgram(s)/Hr 1 Patch Transdermal every 72 hours  ferrous    sulfate Liquid 300 milliGRAM(s) Enteral Tube daily  insulin lispro (HumaLOG) corrective regimen sliding scale   SubCutaneous every 6 hours  insulin NPH human recombinant 8 Unit(s) SubCutaneous <User Schedule>  lidocaine   Patch 1 Patch Transdermal every 24 hours  minocycline 100 milliGRAM(s) Oral two times a day  mirtazapine 15 milliGRAM(s) Oral <User Schedule>  mupirocin 2% Ointment 1 Application(s) Topical two times a day  nystatin    Suspension 938007 Unit(s) Oral four times a day  pantoprazole  Injectable 40 milliGRAM(s) IV Push every 24 hours  predniSONE   Tablet 7.5 milliGRAM(s) Oral <User Schedule>  sertraline 50 milliGRAM(s) Oral daily    MEDICATIONS  (PRN):  acetaminophen  IVPB .. 1000 milliGRAM(s) IV Intermittent every 8 hours PRN Mild Pain (1 - 3), Moderate Pain (4 - 6), Severe Pain (7 - 10)  dextrose 40% Gel 15 Gram(s) Oral once PRN Blood Glucose LESS THAN 70 milliGRAM(s)/deciLiter  glucagon  Injectable 1 milliGRAM(s) IntraMuscular once PRN Glucose <70 milliGRAM(s)/deciLiter  morphine  - Injectable 1 milliGRAM(s) IV Push every 4 hours PRN breakthrough pain  nystatin Powder 1 Application(s) Topical two times a day PRN rash/itchiness    LABS:                        7.6    11.2  )-----------( 365      ( 19 Sep 2018 07:06 )             20.6     -    144  |  105  |  57<H>  ----------------------------<  172<H>  3.9   |  24  |  0.79    Ca    9.6      19 Sep 2018 07:06        Urinalysis Basic - ( 19 Sep 2018 08:11 )    Color: Yellow / Appearance: Slightly Turbid / S.018 / pH: x  Gluc: x / Ketone: Negative  / Bili: Negative / Urobili: Negative mg/dL   Blood: x / Protein: 100 mg/dL / Nitrite: Negative   Leuk Esterase: Small / RBC: 73 /HPF / WBC 26 /HPF   Sq Epi: x / Non Sq Epi: 10 /HPF / Bacteria: Few      CAPILLARY BLOOD GLUCOSE      POCT Blood Glucose.: 124 mg/dL (19 Sep 2018 21:48)  POCT Blood Glucose.: 154 mg/dL (19 Sep 2018 17:14)  POCT Blood Glucose.: 175 mg/dL (19 Sep 2018 12:10)  POCT Blood Glucose.: 219 mg/dL (19 Sep 2018 05:56)  POCT Blood Glucose.: 174 mg/dL (18 Sep 2018 23:53)        Urinalysis Basic - ( 19 Sep 2018 08:11 )    Color: Yellow / Appearance: Slightly Turbid / S.018 / pH: x  Gluc: x / Ketone: Negative  / Bili: Negative / Urobili: Negative mg/dL   Blood: x / Protein: 100 mg/dL / Nitrite: Negative   Leuk Esterase: Small / RBC: 73 /HPF / WBC 26 /HPF   Sq Epi: x / Non Sq Epi: 10 /HPF / Bacteria: Few    Consultant(s) Notes Reviewed:  [x ] YES  [ ] NO    PHYSICAL EXAM:  GENERAL: NAD, Cachetic not in any distress ,  HEAD:  Atraumatic, Normocephalic  NECK: Supple, No JVD, Normal thyroid  NERVOUS SYSTEM:  Alert & Oriented X1, limited exam   CHEST/LUNG: Good air entry bilateral except bases with no  rales, rhonchi, wheezing, or rubs  HEART: Regular rate and rhythm; No murmurs, rubs, or gallops  ABDOMEN: Soft, Nontender, Nondistended; Bowel sounds present, PEG+  EXTREMITIES: Rt thigh wound vac.     Care Discussed with Consultants/Other Providers [ x] YES  [ ] NO

## 2018-09-19 NOTE — PROVIDER CONTACT NOTE (OTHER) - ASSESSMENT
pt a and o x1, very minimally speaking or answering questions, pt with gavin patent and draining, pt daughter at bedside, pt no distress

## 2018-09-19 NOTE — PROGRESS NOTE ADULT - PROBLEM SELECTOR PLAN 1
-test BG Q6h  -c/w NPH 8 units QAM at 6am w/prednisone dose  -c/w Humalog low correction scale Q6h  -will need to re-evaluate insulin regimen if taken off tube feeds. If not tolerating feeds, may need to increase surveillance for hypoglycemia and may require D5 infusion.   will monitor  pager: [phone]

## 2018-09-19 NOTE — CHART NOTE - NSCHARTNOTEFT_GEN_A_CORE
MEDICINE NP    MYRIAM HEATH  68y Female    Patient is a 68y old  Female who presents with a chief complaint of weakness (18 Sep 2018 15:42)       > Event Summary:  Notified by RN, Patient with low BP 86/50.  Patient seen at bedside w/ daughter present.  H/o Alzheimers, unable to contribute.  Arousable to touch, answers name and simple questions.    Patient noted feverish to touch, Rectal Temp >101.       > Vital Signs Last 24 Hrs  T(C): 38.3 (19 Sep 2018 06:42), Max: 38.3 (19 Sep 2018 06:42)  T(F): 101 (19 Sep 2018 06:42), Max: 101 (19 Sep 2018 06:42)  HR: 99 (19 Sep 2018 06:19) (60 - 101)  BP: 86/50 (19 Sep 2018 06:22) (86/50 - 149/87)  RR: 18 (19 Sep 2018 06:19) (18 - 18)  SpO2: 93% (19 Sep 2018 06:19) (92% - 95%)    > Review Of System:   General:	+Fever     Neurological: Unable to contribute.    Respiratory:  No cough. No SOB. No hemoptysis  Cardiovascular:  No chest pain.    Gastrointestinal: No abdominal pain. No Nausea/ vomiting/ diarrhea.  Musculoskeletal: +Right hip pain w/ movement.        > Physical Assessment:  General: Awakens to touch.  AOX1, non-contributory.   Answers name and simple questions.    CV: +S1S2, RRR.  No peripheral edema  Respiratory: Even, unlabored.  CTA B/L.    Abdomen:  +BS.  Soft, NT, ND.   +PEG w/ Tube feeding  MSK:  +Right hip with post surgical dressing clean/dry/intact and wound vac    Skin:  +Feverish to touch         > Assessment & Plan:  - HPI:  67 y/o Female, PMHX of Ashtma, DMT2, Anxiety/ Depression, HLD, UC, pHTN.  Admitted with Sepsis s/p fall (s/p ICU for resp failure/sepsis).  New right hip fx s/p rt hip ORIF f/w high grade MRSA bacteremia and right prosthetic hip infection. POD47 s/p R TREVER explant, articulating spacer and distal femur ORIF, L acetabular fx.  Now hypotensive and fever.    1)  Hypotensive and Fever: consc                   COURTNEY WorkmanP-BC  Medicine Department MEDICINE NP    MYRIAM HEATH  68y Female    Patient is a 68y old  Female who presents with a chief complaint of weakness (18 Sep 2018 15:42)       > Event Summary:  Notified by RN, Patient with low BP 86/50.  Patient seen at bedside w/ daughter present.  H/o Alzheimers, unable to contribute.  Arousable to touch, answers name and simple questions.    Patient noted feverish to touch, Rectal Temp >101.       > Vital Signs Last 24 Hrs  T(C): 38.3 (19 Sep 2018 06:42), Max: 38.3 (19 Sep 2018 06:42)  T(F): 101 (19 Sep 2018 06:42), Max: 101 (19 Sep 2018 06:42)  HR: 99 (19 Sep 2018 06:19) (60 - 101)  BP: 86/50 (19 Sep 2018 06:22) (86/50 - 149/87)  RR: 18 (19 Sep 2018 06:19) (18 - 18)  SpO2: 93% (19 Sep 2018 06:19) (92% - 95%)    > Review Of System:   General:	+Fever     Neurological: Unable to contribute.    Respiratory:  No cough. No SOB. No hemoptysis  Cardiovascular:  No chest pain.    Gastrointestinal: No abdominal pain. No Nausea/ vomiting/ diarrhea.  Musculoskeletal: +Right hip pain w/ movement.        > Physical Assessment:  General: Awakens to touch.  AOX1, non-contributory.   Answers name and simple questions.    CV: +S1S2, RRR.  No peripheral edema  Respiratory: Even, unlabored.  CTA B/L.    Abdomen:  +BS.  Soft, NT, ND.   +PEG w/ Tube feeding  MSK:  +Right hip with post surgical dressing clean/dry/intact and wound vac    Skin:  +Feverish to touch         > Assessment & Plan:  - HPI:  69 y/o Female, PMHX of Ashtma, DMT2, Anxiety/ Depression, HLD, UC, pHTN.  Admitted with Sepsis s/p fall (s/p ICU for resp failure/sepsis).  New right hip fx s/p rt hip ORIF f/w high grade MRSA bacteremia and right prosthetic hip infection. POD47 s/p R TREVER explant, articulating spacer and distal femur ORIF, L acetabular fx.  Now hypotensive and fever.    1)  Hypotensive and Fever: Concern for Sepsis  -Tylenol 1G IV x1 now  -f/u STAT CBC, Lactate, BMP  -Bld CX, UA, CXRAy ordered  -IVF 500ml bolus and re-evaluate  -c/w Minocycline abx and f/u with ID this AM   -D/w Dr. Mark covering for Dr. Bolden,  recommends to monitor closely and ICU consult if not improved  -Endorsed to day NP team             Sonia Guthrie, COURTNEYP-BC  Medicine Department  #[phone]

## 2018-09-19 NOTE — PROGRESS NOTE ADULT - ASSESSMENT
68 year old female w/hyperglycemia due to chronic steroid use, on continuous tube feeds, here w/weakness and MRSA bacteremia w/wound vac. BG values 100-low 200's over past 24 hours w/out hypoglycemia. Tolerating feeds. Will need to evaluate after full 24 hours with increased NPH dose. BG goal (100-200mg/dl).

## 2018-09-19 NOTE — PROGRESS NOTE ADULT - SUBJECTIVE AND OBJECTIVE BOX
Diabetes Follow up note:  Interval Hx:  68 year old female w/steroid induced hyperglycemia on chronic prednisone on continuous tube feeds. Prednisone moved to 6am starting today with increase of NPH dose. Noon glucose at 175mg/dl. Pt seen at bedside w/daughter present. Tolerating feeds at goal. Had overnight fever. Pt w/out complaint.       Review of Systems:  General: no complaints.   GI: Tolerating TFs without any N/V/D/ABD PAIN.  CV: No CP/SOB  ENDO: No S&Sx of hypoglycemia  MEDS:  insulin lispro (HumaLOG) corrective regimen sliding scale   SubCutaneous every 6 hours  insulin NPH human recombinant 8 Unit(s) SubCutaneous <User Schedule>  predniSONE   Tablet 7.5 milliGRAM(s) Oral <User Schedule>    minocycline 100 milliGRAM(s) Oral two times a day  nystatin    Suspension 393998 Unit(s) Oral four times a day    Allergies    ASA; dye contrast (Anaphylaxis)  aspirin (Short breath)  divalproex sodium (Other (Unknown))  Haldol (Other (Unknown))  penicillin (Short breath; Rash)  sulfa drugs (Short breath; Rash)  Xanax (Other (Unknown))        PE:  General: Female lying in bed. NAD.   Vital Signs Last 24 Hrs  T(C): 37 (19 Sep 2018 12:10), Max: 38.3 (19 Sep 2018 06:42)  T(F): 98.6 (19 Sep 2018 12:10), Max: 101 (19 Sep 2018 06:42)  HR: 94 (19 Sep 2018 12:10) (60 - 101)  BP: 106/63 (19 Sep 2018 12:10) (86/50 - 149/87)  BP(mean): --  RR: 18 (19 Sep 2018 12:10) (18 - 18)  SpO2: 98% (19 Sep 2018 12:10) (92% - 98%)  Abd: Soft, NT,ND, + PEG  Extremities: Warm. VAC in place.   Neuro: A&O X3    LABS:    POCT Blood Glucose.: 175 mg/dL (09-19-18 @ 12:10)  POCT Blood Glucose.: 219 mg/dL (09-19-18 @ 05:56)  POCT Blood Glucose.: 174 mg/dL (09-18-18 @ 23:53)  POCT Blood Glucose.: 197 mg/dL (09-18-18 @ 21:45)  POCT Blood Glucose.: 106 mg/dL (09-18-18 @ 17:09)  POCT Blood Glucose.: 155 mg/dL (09-18-18 @ 12:04)  POCT Blood Glucose.: 213 mg/dL (09-18-18 @ 06:38)  POCT Blood Glucose.: 204 mg/dL (09-18-18 @ 00:12)  POCT Blood Glucose.: 137 mg/dL (09-17-18 @ 17:24)  POCT Blood Glucose.: 141 mg/dL (09-17-18 @ 12:34)  POCT Blood Glucose.: 230 mg/dL (09-17-18 @ 06:21)  POCT Blood Glucose.: 186 mg/dL (09-16-18 @ 23:57)  POCT Blood Glucose.: 140 mg/dL (09-16-18 @ 17:23)                            7.6    11.2  )-----------( 365      ( 19 Sep 2018 07:06 )             20.6       09-19    144  |  105  |  57<H>  ----------------------------<  172<H>  3.9   |  24  |  0.79    Ca    9.6      19 Sep 2018 07:06          Hemoglobin A1C, Whole Blood: 5.5 % [4.0 - 5.6] (07-27-18 @ 09:02)            Contact number: darian [phone] or [phone]

## 2018-09-20 DIAGNOSIS — R13.10 DYSPHAGIA, UNSPECIFIED: ICD-10-CM

## 2018-09-20 LAB
BASOPHILS # BLD AUTO: 0.04 K/UL — SIGNIFICANT CHANGE UP (ref 0–0.2)
BASOPHILS NFR BLD AUTO: 0.4 % — SIGNIFICANT CHANGE UP (ref 0–2)
EOSINOPHIL # BLD AUTO: 0.57 K/UL — HIGH (ref 0–0.5)
EOSINOPHIL NFR BLD AUTO: 5.6 % — SIGNIFICANT CHANGE UP (ref 0–6)
GLUCOSE BLDC GLUCOMTR-MCNC: 128 MG/DL — HIGH (ref 70–99)
GLUCOSE BLDC GLUCOMTR-MCNC: 160 MG/DL — HIGH (ref 70–99)
GLUCOSE BLDC GLUCOMTR-MCNC: 173 MG/DL — HIGH (ref 70–99)
GLUCOSE BLDC GLUCOMTR-MCNC: 178 MG/DL — HIGH (ref 70–99)
GLUCOSE BLDC GLUCOMTR-MCNC: 186 MG/DL — HIGH (ref 70–99)
HCT VFR BLD CALC: 26 % — LOW (ref 34.5–45)
HGB BLD-MCNC: 7.9 G/DL — LOW (ref 11.5–15.5)
IMM GRANULOCYTES NFR BLD AUTO: 0.2 % — SIGNIFICANT CHANGE UP (ref 0–1.5)
LYMPHOCYTES # BLD AUTO: 1.3 K/UL — SIGNIFICANT CHANGE UP (ref 1–3.3)
LYMPHOCYTES # BLD AUTO: 12.7 % — LOW (ref 13–44)
MCHC RBC-ENTMCNC: 29.7 PG — SIGNIFICANT CHANGE UP (ref 27–34)
MCHC RBC-ENTMCNC: 30.4 GM/DL — LOW (ref 32–36)
MCV RBC AUTO: 97.7 FL — SIGNIFICANT CHANGE UP (ref 80–100)
MONOCYTES # BLD AUTO: 0.93 K/UL — HIGH (ref 0–0.9)
MONOCYTES NFR BLD AUTO: 9.1 % — SIGNIFICANT CHANGE UP (ref 2–14)
NEUTROPHILS # BLD AUTO: 7.39 K/UL — SIGNIFICANT CHANGE UP (ref 1.8–7.4)
NEUTROPHILS NFR BLD AUTO: 72 % — SIGNIFICANT CHANGE UP (ref 43–77)
PLATELET # BLD AUTO: 375 K/UL — SIGNIFICANT CHANGE UP (ref 150–400)
RBC # BLD: 2.66 M/UL — LOW (ref 3.8–5.2)
RBC # FLD: 18 % — HIGH (ref 10.3–14.5)
WBC # BLD: 10.25 K/UL — SIGNIFICANT CHANGE UP (ref 3.8–10.5)
WBC # FLD AUTO: 10.25 K/UL — SIGNIFICANT CHANGE UP (ref 3.8–10.5)

## 2018-09-20 RX ORDER — ACETAMINOPHEN 500 MG
650 TABLET ORAL ONCE
Qty: 0 | Refills: 0 | Status: COMPLETED | OUTPATIENT
Start: 2018-09-20 | End: 2018-09-20

## 2018-09-20 RX ORDER — ACETAMINOPHEN 500 MG
500 TABLET ORAL ONCE
Qty: 0 | Refills: 0 | Status: COMPLETED | OUTPATIENT
Start: 2018-09-20 | End: 2018-09-20

## 2018-09-20 RX ORDER — ACETAMINOPHEN 500 MG
325 TABLET ORAL ONCE
Qty: 0 | Refills: 0 | Status: COMPLETED | OUTPATIENT
Start: 2018-09-20 | End: 2018-09-20

## 2018-09-20 RX ADMIN — Medication 650 MILLIGRAM(S): at 12:50

## 2018-09-20 RX ADMIN — MORPHINE SULFATE 1 MILLIGRAM(S): 50 CAPSULE, EXTENDED RELEASE ORAL at 10:25

## 2018-09-20 RX ADMIN — MUPIROCIN 1 APPLICATION(S): 20 OINTMENT TOPICAL at 18:13

## 2018-09-20 RX ADMIN — LIDOCAINE 1 PATCH: 4 CREAM TOPICAL at 22:03

## 2018-09-20 RX ADMIN — Medication 7.5 MILLIGRAM(S): at 06:01

## 2018-09-20 RX ADMIN — MORPHINE SULFATE 1 MILLIGRAM(S): 50 CAPSULE, EXTENDED RELEASE ORAL at 15:30

## 2018-09-20 RX ADMIN — Medication 0.5 MILLIGRAM(S): at 12:21

## 2018-09-20 RX ADMIN — MORPHINE SULFATE 1 MILLIGRAM(S): 50 CAPSULE, EXTENDED RELEASE ORAL at 06:13

## 2018-09-20 RX ADMIN — HUMAN INSULIN 8 UNIT(S): 100 INJECTION, SUSPENSION SUBCUTANEOUS at 06:00

## 2018-09-20 RX ADMIN — Medication 200 MILLIGRAM(S): at 18:13

## 2018-09-20 RX ADMIN — Medication 325 MILLIGRAM(S): at 20:44

## 2018-09-20 RX ADMIN — LIDOCAINE 1 PATCH: 4 CREAM TOPICAL at 09:53

## 2018-09-20 RX ADMIN — Medication 650 MILLIGRAM(S): at 12:20

## 2018-09-20 RX ADMIN — MORPHINE SULFATE 1 MILLIGRAM(S): 50 CAPSULE, EXTENDED RELEASE ORAL at 06:43

## 2018-09-20 RX ADMIN — MINOCYCLINE HYDROCHLORIDE 100 MILLIGRAM(S): 45 TABLET, EXTENDED RELEASE ORAL at 18:14

## 2018-09-20 RX ADMIN — ENOXAPARIN SODIUM 40 MILLIGRAM(S): 100 INJECTION SUBCUTANEOUS at 12:21

## 2018-09-20 RX ADMIN — Medication 500 MILLIGRAM(S): at 18:43

## 2018-09-20 RX ADMIN — FENTANYL CITRATE 1 PATCH: 50 INJECTION INTRAVENOUS at 18:14

## 2018-09-20 RX ADMIN — MUPIROCIN 1 APPLICATION(S): 20 OINTMENT TOPICAL at 06:00

## 2018-09-20 RX ADMIN — PANTOPRAZOLE SODIUM 40 MILLIGRAM(S): 20 TABLET, DELAYED RELEASE ORAL at 12:21

## 2018-09-20 RX ADMIN — CHLORHEXIDINE GLUCONATE 1 APPLICATION(S): 213 SOLUTION TOPICAL at 06:22

## 2018-09-20 RX ADMIN — FENTANYL CITRATE 1 PATCH: 50 INJECTION INTRAVENOUS at 18:13

## 2018-09-20 RX ADMIN — ERGOCALCIFEROL 8000 UNIT(S): 1.25 CAPSULE ORAL at 12:21

## 2018-09-20 RX ADMIN — Medication 500000 UNIT(S): at 12:21

## 2018-09-20 RX ADMIN — MORPHINE SULFATE 1 MILLIGRAM(S): 50 CAPSULE, EXTENDED RELEASE ORAL at 15:46

## 2018-09-20 RX ADMIN — SERTRALINE 50 MILLIGRAM(S): 25 TABLET, FILM COATED ORAL at 12:21

## 2018-09-20 RX ADMIN — Medication 3 MILLILITER(S): at 18:13

## 2018-09-20 RX ADMIN — Medication 325 MILLIGRAM(S): at 21:14

## 2018-09-20 RX ADMIN — Medication 1: at 12:22

## 2018-09-20 RX ADMIN — Medication 3 MILLILITER(S): at 12:20

## 2018-09-20 RX ADMIN — Medication 300 MILLIGRAM(S): at 12:21

## 2018-09-20 RX ADMIN — MORPHINE SULFATE 1 MILLIGRAM(S): 50 CAPSULE, EXTENDED RELEASE ORAL at 10:08

## 2018-09-20 RX ADMIN — Medication 500000 UNIT(S): at 06:01

## 2018-09-20 RX ADMIN — MORPHINE SULFATE 1 MILLIGRAM(S): 50 CAPSULE, EXTENDED RELEASE ORAL at 00:18

## 2018-09-20 RX ADMIN — Medication 3 MILLILITER(S): at 06:00

## 2018-09-20 RX ADMIN — MINOCYCLINE HYDROCHLORIDE 100 MILLIGRAM(S): 45 TABLET, EXTENDED RELEASE ORAL at 06:00

## 2018-09-20 RX ADMIN — Medication 2 MILLIGRAM(S): at 22:03

## 2018-09-20 RX ADMIN — MIRTAZAPINE 15 MILLIGRAM(S): 45 TABLET, ORALLY DISINTEGRATING ORAL at 22:03

## 2018-09-20 RX ADMIN — Medication 500000 UNIT(S): at 18:13

## 2018-09-20 RX ADMIN — Medication 1: at 06:00

## 2018-09-20 NOTE — CHART NOTE - NSCHARTNOTEFT_GEN_A_CORE
Malnutrition follow up.  Consult for weight loss. Patient tolerating tube feeding, no complaints when patient's daughter asks her, concurred by private HHA..  Source:     Family [x ]     other [ x] HHA, RN, medical record/team    Diet : NPO with tube feeding  SLP swallow evaluation noted as cancelled  9/17/2018  as patient is not a candidate for MBS due to patient's inability to sit OOB in chair.       Enteral Nutrition: Pivot @ 35ml/hr x24hrs providing 840ml, 1260 calories, 37/kg based on current weight 33.8kg;  Protein 78gm, 1.8gm/kg based on IBW 43 kg.      Current Weight: Weight (kg): 33.8 (09-19 @ 12:10)   Weight Change discrepancy: dosing weight 50.4 kg up until 9/14/2018 as recorded, likely pounds added from equipment on the bed per RNPeewee; possibly wound vac,   and/or pneumatic stocking box adding weight to bed.    Pertinent Medications: MEDICATIONS  (STANDING):  acetaminophen  IVPB .. 500 milliGRAM(s) IV Intermittent once  ALBUTerol/ipratropium for Nebulization 3 milliLiter(s) Nebulizer every 6 hours  buDESOnide   0.5 milliGRAM(s) Respule 0.5 milliGRAM(s) Inhalation every 12 hours  chlorhexidine 4% Liquid 1 Application(s) Topical <User Schedule>  clonazePAM Tablet 2 milliGRAM(s) Oral <User Schedule>  dextrose 5%. 1000 milliLiter(s) (50 mL/Hr) IV Continuous <Continuous>  dextrose 50% Injectable 12.5 Gram(s) IV Push once  dextrose 50% Injectable 25 Gram(s) IV Push once  dextrose 50% Injectable 25 Gram(s) IV Push once  enoxaparin Injectable 40 milliGRAM(s) SubCutaneous daily  ergocalciferol Drops 8000 Unit(s) Oral daily  fentaNYL   Patch  12 MICROgram(s)/Hr 1 Patch Transdermal every 72 hours  ferrous    sulfate Liquid 300 milliGRAM(s) Enteral Tube daily  insulin lispro (HumaLOG) corrective regimen sliding scale   SubCutaneous every 6 hours  insulin NPH human recombinant 8 Unit(s) SubCutaneous <User Schedule>  lidocaine   Patch 1 Patch Transdermal every 24 hours  minocycline 100 milliGRAM(s) Oral two times a day  mirtazapine 15 milliGRAM(s) Oral <User Schedule>  mupirocin 2% Ointment 1 Application(s) Topical two times a day  nystatin    Suspension 544747 Unit(s) Oral four times a day  pantoprazole  Injectable 40 milliGRAM(s) IV Push every 24 hours  predniSONE   Tablet 7.5 milliGRAM(s) Oral <User Schedule>  sertraline 50 milliGRAM(s) Oral daily    MEDICATIONS  (PRN):  acetaminophen  IVPB .. 1000 milliGRAM(s) IV Intermittent every 8 hours PRN Mild Pain (1 - 3), Moderate Pain (4 - 6), Severe Pain (7 - 10)  dextrose 40% Gel 15 Gram(s) Oral once PRN Blood Glucose LESS THAN 70 milliGRAM(s)/deciLiter  glucagon  Injectable 1 milliGRAM(s) IntraMuscular once PRN Glucose <70 milliGRAM(s)/deciLiter  morphine  - Injectable 1 milliGRAM(s) IV Push every 4 hours PRN breakthrough pain  nystatin Powder 1 Application(s) Topical two times a day PRN rash/itchiness    Pertinent Labs:  09-19 Na144 mmol/L Glu 172 mg/dL<H> K+ 3.9 mmol/L Cr  0.79 mg/dL BUN 57 mg/dL<H> 09-15 Phos 4.3 mg/dL    GI BM x2, l  Skin: maryjane heel DTI, sacrum st II;  MRSA infected hip wound with vac    Estimated Needs:   [ X] no change since previous assessment  [ ] recalculated:       Previous Nutrition Diagnosis:    [X ] Malnutrition          Nutrition Diagnosis is [ ] ongoing  [ ] resolved [ ] not applicable          New Nutrition Diagnosis: [X ] not applicable        Recommendations:    [x ] Enteral Nutrition Support :   Pivot @ 35ml/hr x24hrs providing 840ml, 1260 calories, 37/kg based on current weight 33.8kg;  Protein 78gm, 1.8gm/kg based on IBW 43 kg.    [ ] Other: add free water as needed       Monitoring and Evaluation:      [ x] Tolerance to tube feeding[X ] weights [X ] follow up per protocol

## 2018-09-20 NOTE — PROGRESS NOTE ADULT - SUBJECTIVE AND OBJECTIVE BOX
CC: f/u for fever and MRSA right hip infection    Patient reports: she is less responsive, neck in usual dorsiflex position.Tolerating enteral feeds.PICC line exit site has been clean    REVIEW OF SYSTEMS:  All other review of systems negative (Comprehensive ROS)    Antimicrobials Day #  :s/p 5 days of cefepime  and 42 days of IV MRSA therapy  minocycline 100 milliGRAM(s) Oral two times a day  nystatin    Suspension [id number] Unit(s) Oral four times a day    Other Medications Reviewed    T(F): 102.8 (18 @ 15:54), Max: 102.8 (18 @ 15:54)  HR: 112 (18 @ 13:47)  BP: 145/80 (18 @ 13:47)  RR: 18 (18 @ 13:47)  SpO2: 92% (18 @ 13:47)  Wt(kg): --    PHYSICAL EXAM:  General: lethargic, no acute distress, neck dorsiflexed  Eyes:  anicteric, no conjunctival injection, no discharge  Oropharynx: no lesions or injection 	  Neck: supple, without adenopathy  Lungs: clear to auscultation,diminished at bases  Heart: regular rate and rhythm; no murmur, rubs or gallops  Abdomen: soft, nondistended, nontender, without mass or organomegaly, peg in place  Skin: wound vac rt hip  Extremities: no clubbing, cyanosis, or edema  Neurologic: poorly interactive    LAB RESULTS:                        7.9    10.25 )-----------( 375      ( 20 Sep 2018 07:43 )             26.0         144  |  105  |  57<H>  ----------------------------<  172<H>  3.9   |  24  |  0.79    Ca    9.6      19 Sep 2018 07:06        Urinalysis Basic - ( 19 Sep 2018 08:11 )    Color: Yellow / Appearance: Slightly Turbid / S.018 / pH: x  Gluc: x / Ketone: Negative  / Bili: Negative / Urobili: Negative mg/dL   Blood: x / Protein: 100 mg/dL / Nitrite: Negative   Leuk Esterase: Small / RBC: 73 /HPF / WBC 26 /HPF   Sq Epi: x / Non Sq Epi: 10 /HPF / Bacteria: Few      MICROBIOLOGY:  RECENT CULTURES:   @ 08:39 .Blood Blood-Peripheral     No growth to date.          RADIOLOGY REVIEWED:    < from: Xray Chest 1 View- PORTABLE-Routine (18 @ 16:44) >      INTERPRETATION:  CLINICAL INFORMATION: Leukocytosis     EXAM: AP view of the chest         COMPARISON: Radiograph dated  2018    FINDINGS:    Limited evaluation due to radiographic technique and patient positioning.    The patient's chin obscures the upper lungs and chest. No pneumothorax.      IMPRESSION:    Limited evaluation due to radiographic technique.    < end of copied text >

## 2018-09-20 NOTE — PROGRESS NOTE ADULT - SUBJECTIVE AND OBJECTIVE BOX
Events noted. Per daughter,  last night noted pt. did not sleep much. Calm today but mute for the most part. Has low grade temp (110.8) but WBC down.       Vital Signs Last 24 Hrs  T(C): 38.2 (20 Sep 2018 11:04), Max: 38.2 (20 Sep 2018 11:04)  T(F): 100.8 (20 Sep 2018 11:04), Max: 100.8 (20 Sep 2018 11:04)  HR: 87 (20 Sep 2018 10:13) (87 - 140)  BP: 158/89 (20 Sep 2018 10:13) (115/72 - 158/89)  BP(mean): --  RR: 18 (20 Sep 2018 10:13) (18 - 18)  SpO2: 96% (20 Sep 2018 10:13) (96% - 100%)                          7.9    10.25 )-----------( 375      ( 20 Sep 2018 07:43 )             26.0       09-19    144  |  105  |  57<H>  ----------------------------<  172<H>  3.9   |  24  |  0.79    Ca    9.6      19 Sep 2018 07:06                MEDICATIONS  (STANDING):  ALBUTerol/ipratropium for Nebulization 3 milliLiter(s) Nebulizer every 6 hours  buDESOnide   0.5 milliGRAM(s) Respule 0.5 milliGRAM(s) Inhalation every 12 hours  chlorhexidine 4% Liquid 1 Application(s) Topical <User Schedule>  clonazePAM Tablet 2 milliGRAM(s) Oral <User Schedule>  dextrose 5%. 1000 milliLiter(s) (50 mL/Hr) IV Continuous <Continuous>  dextrose 50% Injectable 12.5 Gram(s) IV Push once  dextrose 50% Injectable 25 Gram(s) IV Push once  dextrose 50% Injectable 25 Gram(s) IV Push once  enoxaparin Injectable 40 milliGRAM(s) SubCutaneous daily  ergocalciferol Drops 8000 Unit(s) Oral daily  fentaNYL   Patch  12 MICROgram(s)/Hr 1 Patch Transdermal every 72 hours  ferrous    sulfate Liquid 300 milliGRAM(s) Enteral Tube daily  insulin lispro (HumaLOG) corrective regimen sliding scale   SubCutaneous every 6 hours  insulin NPH human recombinant 8 Unit(s) SubCutaneous <User Schedule>  lidocaine   Patch 1 Patch Transdermal every 24 hours  minocycline 100 milliGRAM(s) Oral two times a day  mirtazapine 15 milliGRAM(s) Oral <User Schedule>  mupirocin 2% Ointment 1 Application(s) Topical two times a day  nystatin    Suspension 131028 Unit(s) Oral four times a day  pantoprazole  Injectable 40 milliGRAM(s) IV Push every 24 hours  predniSONE   Tablet 7.5 milliGRAM(s) Oral <User Schedule>  sertraline 50 milliGRAM(s) Oral daily    MEDICATIONS  (PRN):  acetaminophen  IVPB .. 1000 milliGRAM(s) IV Intermittent every 8 hours PRN Mild Pain (1 - 3), Moderate Pain (4 - 6), Severe Pain (7 - 10)  dextrose 40% Gel 15 Gram(s) Oral once PRN Blood Glucose LESS THAN 70 milliGRAM(s)/deciLiter  glucagon  Injectable 1 milliGRAM(s) IntraMuscular once PRN Glucose <70 milliGRAM(s)/deciLiter  morphine  - Injectable 1 milliGRAM(s) IV Push every 4 hours PRN breakthrough pain  nystatin Powder 1 Application(s) Topical two times a day PRN rash/itchiness      Elderly WF in chair with head dorsiflexed to chest. Fidgets with blanket. She is alert and oriented x 0  .  No psychomotor abnormalities. Insight and judgment are impaired. Mute. No hallucinations nor delusions. Impaired Attention and concentration, short term memory, and long term memory.

## 2018-09-20 NOTE — PROGRESS NOTE ADULT - ASSESSMENT
Dementia  Rule out mild delirium    Recommend  If agitation is not relieved by analgesia, can rx Seroquel 12.5mg q8h prn (hold Seroquel if hypotensive or if QTc is 500ms or greater).  Continue with Zoloft and Remeron.    Irvin Reyes M.D.  Psychiatry  [phone]

## 2018-09-20 NOTE — PROGRESS NOTE ADULT - ASSESSMENT
69 yo female with dementia, history of UC, and s/p R Hip hemiarthroplasty 6/22/18, post op hematoma  Admitted after left hip fx, noted severe sepsis, MRSA bacteremia, infected R hip- s/p GABRIEL and spacer  Stormy post op course with subsequent episodes of suspected sepsis- unclear source, course of Cefepime  S/p PEG    9/10 CT of thigh without any collection  GAYATHRI negative  Recent CXR reviewed, no obvious signs of pneumonia  Isolated temp 102.8  today  Leukocytosis moderating, latest Bld Cxs negative  No obvious explanation for intermittent fever  ?silent aspiration possible, may still be most likely scenario  Long term prognosis is guarded  PICC line related infection,aspiration, and drug fever all in differential.  I am reluctant to restart IV antibiotics although this may be indicated soon.  I would repeat a CXR but with her contorted body this may give limited information.    Plan  Completed 42 days of IV MRSA therapy 9/15, continue MCN via PEG  Wound care per PRS  Follow temps and CBC/diff   Consider repeat blood cultures and CXR  Central temp dysregulation secondary to meds in differential  Follow conservatively off additional empiric Tx  D/w daughter at bedside

## 2018-09-20 NOTE — PROGRESS NOTE ADULT - SUBJECTIVE AND OBJECTIVE BOX
---___---___---___---___---___---___ ---___---___---___---___---___---___---___---___---___---                    <<<  M E D I C A L   A T T E N D I N G    F O L L O W    U P   N O T E  >>>  has fever today will add tylenol agree with id for conservative therapy .     ---___---___---___---___---___---      <<<  MEDICATIONS:  >>>    MEDICATIONS  (STANDING):  ALBUTerol/ipratropium for Nebulization 3 milliLiter(s) Nebulizer every 6 hours  buDESOnide   0.5 milliGRAM(s) Respule 0.5 milliGRAM(s) Inhalation every 12 hours  chlorhexidine 4% Liquid 1 Application(s) Topical <User Schedule>  clonazePAM Tablet 2 milliGRAM(s) Oral <User Schedule>  dextrose 5%. 1000 milliLiter(s) (50 mL/Hr) IV Continuous <Continuous>  dextrose 50% Injectable 12.5 Gram(s) IV Push once  dextrose 50% Injectable 25 Gram(s) IV Push once  dextrose 50% Injectable 25 Gram(s) IV Push once  enoxaparin Injectable 40 milliGRAM(s) SubCutaneous daily  ergocalciferol Drops 8000 Unit(s) Oral daily  fentaNYL   Patch  12 MICROgram(s)/Hr 1 Patch Transdermal every 72 hours  ferrous    sulfate Liquid 300 milliGRAM(s) Enteral Tube daily  insulin lispro (HumaLOG) corrective regimen sliding scale   SubCutaneous every 6 hours  insulin NPH human recombinant 8 Unit(s) SubCutaneous <User Schedule>  lidocaine   Patch 1 Patch Transdermal every 24 hours  minocycline 100 milliGRAM(s) Oral two times a day  mirtazapine 15 milliGRAM(s) Oral <User Schedule>  mupirocin 2% Ointment 1 Application(s) Topical two times a day  nystatin    Suspension 033164 Unit(s) Oral four times a day  pantoprazole  Injectable 40 milliGRAM(s) IV Push every 24 hours  predniSONE   Tablet 7.5 milliGRAM(s) Oral <User Schedule>  sertraline 50 milliGRAM(s) Oral daily      MEDICATIONS  (PRN):  acetaminophen  IVPB .. 1000 milliGRAM(s) IV Intermittent every 8 hours PRN Mild Pain (1 - 3), Moderate Pain (4 - 6), Severe Pain (7 - 10)  dextrose 40% Gel 15 Gram(s) Oral once PRN Blood Glucose LESS THAN 70 milliGRAM(s)/deciLiter  glucagon  Injectable 1 milliGRAM(s) IntraMuscular once PRN Glucose <70 milliGRAM(s)/deciLiter  morphine  - Injectable 1 milliGRAM(s) IV Push every 4 hours PRN breakthrough pain  nystatin Powder 1 Application(s) Topical two times a day PRN rash/itchiness       ---___---___---___---___---___---     <<<REVIEW OF SYSTEM: >>>    GEN:  fever, no chills, no pain  RESP: no SOB, no cough, no sputum  CVS: no chest pain, no palpitations, no edema  GI: no abdominal pain, no nausea, no vomiting, no constipation, no diarrhea  : no dysurea, no frequency  NEURO: no headache, no dizziness  PSYCH: no depression, not anxious  Derm : no itching, no rash     ---___---___---___---___---___---          <<<  VITAL SIGNS: >>>    T(F): 100.8 (18 @ 11:04), Max: 100.8 (18 @ 11:04)  HR: 87 (18 @ 10:13) (87 - 140)  BP: 158/89 (18 @ 10:13) (115/72 - 158/89)  RR: 18 (18 @ 10:13) (18 - 18)  SpO2: 96% (18 @ 10:13) (96% - 100%)  Wt(kg): --  CAPILLARY BLOOD GLUCOSE      POCT Blood Glucose.: 186 mg/dL (20 Sep 2018 12:14)    I&O's Summary    19 Sep 2018 07:01  -  20 Sep 2018 07:00  --------------------------------------------------------  IN: 1240 mL / OUT: 1050 mL / NET: 190 mL         ---___---___---___---___---___---                       PHYSICAL EXAM:    GEN: A&O X 1 , NAD , comfortable cachectic   HEENT: NCAT, PERRL, MMM, no scleral icterus, hearing intact  NECK: Supple, No JVD  CVS: S1S2 , regular , No M/R/G appreciated  PULM: CTA B/L,  no W/R/R appreciated  ABD.: soft. non tender, non distended,  bowel sounds present peg noted   Extrem: intact pulses , no edema noted  Derm: No rash or ecchymosis noted  PSYCH: normal mood, no depression,  anxious     ---___---___---___---___---___---     <<<  LAB AND IMAGING: >>>                          7.9    10.25 )-----------( 375      ( 20 Sep 2018 07:43 )             26.0               09-    144  |  105  |  57<H>  ----------------------------<  172<H>  3.9   |  24  |  0.79    Ca    9.6      19 Sep 2018 07:06             Urinalysis Basic - ( 19 Sep 2018 08:11 )    Color: Yellow / Appearance: Slightly Turbid / S.018 / pH: x  Gluc: x / Ketone: Negative  / Bili: Negative / Urobili: Negative mg/dL   Blood: x / Protein: 100 mg/dL / Nitrite: Negative   Leuk Esterase: Small / RBC: 73 /HPF / WBC 26 /HPF   Sq Epi: x / Non Sq Epi: 10 /HPF / Bacteria: Few                        [All pertinent / recent available Imaging reports and other labs reviewed]     ---___---___---___---___---___---___ ---___---___---___---___---           <<<  A S S E S S M E N BEST   A N YE   P L A N :  >>>          -GI/DVT Prophylaxis.    --------------------------------------------  Case discussed with   Education given on     >>______________________<<      Deniz Bolden .         phone   [phone]

## 2018-09-20 NOTE — CHART NOTE - NSCHARTNOTEFT_GEN_A_CORE
Pt with intermittent fever - 102.8F after tylenol via PEG for temp of 100.8F, Pt is a poor historian. Daughter at bedside, Denies any cough, chills, nausea, vomiting    Vital Signs Last 24 Hrs  T(C): 39.3 (20 Sep 2018 15:54), Max: 39.3 (20 Sep 2018 15:54)  T(F): 102.8 (20 Sep 2018 15:54), Max: 102.8 (20 Sep 2018 15:54)  HR: 112 (20 Sep 2018 13:47) (87 - 140)  BP: 145/80 (20 Sep 2018 13:47) (119/63 - 158/89)  RR: 18 (20 Sep 2018 13:47) (18 - 18)  SpO2: 92% (20 Sep 2018 13:47) (92% - 100%)                          7.9    10.25 )-----------( 375      ( 20 Sep 2018 07:43 )             26.0   09-19    144  |  105  |  57<H>  ----------------------------<  172<H>  3.9   |  24  |  0.79    Ca    9.6      19 Sep 2018 07:06  9/10 CT of thigh without any collection  GAYATHRI negative    PHYSICAL EXAM:  GENERAL: Cachectic  HEAD:  Atraumatic, Normocephalic  EYES:  conjunctiva and sclera clear  CHEST/LUNG: Kyphosis, Clear to auscultation bilaterally; No wheeze  HEART: Regular rate and rhythm; No murmurs, rubs, or gallops  ABDOMEN: Soft, Nontender, Nondistended; Bowel sounds present, with PEG in place - no redness or discharge from site  EXTREMITIES:  2+ Peripheral Pulses, No clubbing, cyanosis, or edema  Skin - no rashes, Right thigh incision clean with functional wound vac           Blackman draining clear urine  PSYCH: AAOx 0    67 yo female with dementia, history of UC, and s/p R Hip hemiarthroplasty 6/22/18, post op hematoma  Admitted after left hip fx, severe sepsis, MRSA bacteremia, infected R hip- s/p GABRIEL and spacer, subsequent episodes of suspected sepsis- unclear source, course of Cefepime, Now with fever of unclear etiology    Plan:  Await final Blood culture from 9/19,  prelim - no growth  UA in the setting of Chronic Blackman - Small  Leuks, WBC 26  Discussed with Dr. Armenta - continue to hold off abts  Tylenol 500mg IV x 1     [id number]

## 2018-09-21 DIAGNOSIS — A41.02 SEPSIS DUE TO METHICILLIN RESISTANT STAPHYLOCOCCUS AUREUS: ICD-10-CM

## 2018-09-21 LAB
ANION GAP SERPL CALC-SCNC: 11 MMOL/L — SIGNIFICANT CHANGE UP (ref 5–17)
BASOPHILS # BLD AUTO: 0.05 K/UL — SIGNIFICANT CHANGE UP (ref 0–0.2)
BASOPHILS NFR BLD AUTO: 0.7 % — SIGNIFICANT CHANGE UP (ref 0–2)
BLD GP AB SCN SERPL QL: NEGATIVE — SIGNIFICANT CHANGE UP
BUN SERPL-MCNC: 76 MG/DL — HIGH (ref 7–23)
CALCIUM SERPL-MCNC: 10.3 MG/DL — SIGNIFICANT CHANGE UP (ref 8.4–10.5)
CHLORIDE SERPL-SCNC: 110 MMOL/L — HIGH (ref 96–108)
CO2 SERPL-SCNC: 26 MMOL/L — SIGNIFICANT CHANGE UP (ref 22–31)
CREAT SERPL-MCNC: 0.81 MG/DL — SIGNIFICANT CHANGE UP (ref 0.5–1.3)
EOSINOPHIL # BLD AUTO: 0.54 K/UL — HIGH (ref 0–0.5)
EOSINOPHIL NFR BLD AUTO: 7 % — HIGH (ref 0–6)
GLUCOSE BLDC GLUCOMTR-MCNC: 123 MG/DL — HIGH (ref 70–99)
GLUCOSE BLDC GLUCOMTR-MCNC: 159 MG/DL — HIGH (ref 70–99)
GLUCOSE BLDC GLUCOMTR-MCNC: 162 MG/DL — HIGH (ref 70–99)
GLUCOSE BLDC GLUCOMTR-MCNC: 197 MG/DL — HIGH (ref 70–99)
GLUCOSE SERPL-MCNC: 184 MG/DL — HIGH (ref 70–99)
HCT VFR BLD CALC: 27.6 % — LOW (ref 34.5–45)
HGB BLD-MCNC: 8.2 G/DL — LOW (ref 11.5–15.5)
IMM GRANULOCYTES NFR BLD AUTO: 0.3 % — SIGNIFICANT CHANGE UP (ref 0–1.5)
LYMPHOCYTES # BLD AUTO: 1.08 K/UL — SIGNIFICANT CHANGE UP (ref 1–3.3)
LYMPHOCYTES # BLD AUTO: 14.1 % — SIGNIFICANT CHANGE UP (ref 13–44)
MCHC RBC-ENTMCNC: 29.7 GM/DL — LOW (ref 32–36)
MCHC RBC-ENTMCNC: 30 PG — SIGNIFICANT CHANGE UP (ref 27–34)
MCV RBC AUTO: 101.1 FL — HIGH (ref 80–100)
MONOCYTES # BLD AUTO: 0.91 K/UL — HIGH (ref 0–0.9)
MONOCYTES NFR BLD AUTO: 11.9 % — SIGNIFICANT CHANGE UP (ref 2–14)
NEUTROPHILS # BLD AUTO: 5.07 K/UL — SIGNIFICANT CHANGE UP (ref 1.8–7.4)
NEUTROPHILS NFR BLD AUTO: 66 % — SIGNIFICANT CHANGE UP (ref 43–77)
PLATELET # BLD AUTO: 341 K/UL — SIGNIFICANT CHANGE UP (ref 150–400)
POTASSIUM SERPL-MCNC: 3.8 MMOL/L — SIGNIFICANT CHANGE UP (ref 3.5–5.3)
POTASSIUM SERPL-SCNC: 3.8 MMOL/L — SIGNIFICANT CHANGE UP (ref 3.5–5.3)
RAPID RVP RESULT: SIGNIFICANT CHANGE UP
RBC # BLD: 2.73 M/UL — LOW (ref 3.8–5.2)
RBC # FLD: 18.2 % — HIGH (ref 10.3–14.5)
RH IG SCN BLD-IMP: NEGATIVE — SIGNIFICANT CHANGE UP
SODIUM SERPL-SCNC: 147 MMOL/L — HIGH (ref 135–145)
WBC # BLD: 7.67 K/UL — SIGNIFICANT CHANGE UP (ref 3.8–10.5)
WBC # FLD AUTO: 7.67 K/UL — SIGNIFICANT CHANGE UP (ref 3.8–10.5)

## 2018-09-21 PROCEDURE — 99232 SBSQ HOSP IP/OBS MODERATE 35: CPT

## 2018-09-21 PROCEDURE — 71045 X-RAY EXAM CHEST 1 VIEW: CPT | Mod: 26

## 2018-09-21 RX ORDER — ACETAMINOPHEN 500 MG
650 TABLET ORAL ONCE
Qty: 0 | Refills: 0 | Status: COMPLETED | OUTPATIENT
Start: 2018-09-21 | End: 2018-09-21

## 2018-09-21 RX ORDER — MORPHINE SULFATE 50 MG/1
1 CAPSULE, EXTENDED RELEASE ORAL EVERY 4 HOURS
Qty: 0 | Refills: 0 | Status: DISCONTINUED | OUTPATIENT
Start: 2018-09-21 | End: 2018-09-28

## 2018-09-21 RX ORDER — FENTANYL CITRATE 50 UG/ML
1 INJECTION INTRAVENOUS
Qty: 0 | Refills: 0 | Status: DISCONTINUED | OUTPATIENT
Start: 2018-09-21 | End: 2018-09-28

## 2018-09-21 RX ADMIN — Medication 3 MILLILITER(S): at 06:32

## 2018-09-21 RX ADMIN — Medication 2 MILLIGRAM(S): at 22:03

## 2018-09-21 RX ADMIN — Medication 500000 UNIT(S): at 23:17

## 2018-09-21 RX ADMIN — Medication 650 MILLIGRAM(S): at 23:16

## 2018-09-21 RX ADMIN — MIRTAZAPINE 15 MILLIGRAM(S): 45 TABLET, ORALLY DISINTEGRATING ORAL at 22:03

## 2018-09-21 RX ADMIN — Medication 0.5 MILLIGRAM(S): at 12:48

## 2018-09-21 RX ADMIN — SERTRALINE 50 MILLIGRAM(S): 25 TABLET, FILM COATED ORAL at 12:47

## 2018-09-21 RX ADMIN — Medication 1: at 00:00

## 2018-09-21 RX ADMIN — MUPIROCIN 1 APPLICATION(S): 20 OINTMENT TOPICAL at 06:34

## 2018-09-21 RX ADMIN — ERGOCALCIFEROL 8000 UNIT(S): 1.25 CAPSULE ORAL at 22:03

## 2018-09-21 RX ADMIN — Medication 500000 UNIT(S): at 00:01

## 2018-09-21 RX ADMIN — MORPHINE SULFATE 1 MILLIGRAM(S): 50 CAPSULE, EXTENDED RELEASE ORAL at 11:03

## 2018-09-21 RX ADMIN — CHLORHEXIDINE GLUCONATE 1 APPLICATION(S): 213 SOLUTION TOPICAL at 12:48

## 2018-09-21 RX ADMIN — MORPHINE SULFATE 1 MILLIGRAM(S): 50 CAPSULE, EXTENDED RELEASE ORAL at 11:20

## 2018-09-21 RX ADMIN — PANTOPRAZOLE SODIUM 40 MILLIGRAM(S): 20 TABLET, DELAYED RELEASE ORAL at 12:47

## 2018-09-21 RX ADMIN — Medication 7.5 MILLIGRAM(S): at 06:33

## 2018-09-21 RX ADMIN — Medication 1: at 12:48

## 2018-09-21 RX ADMIN — MINOCYCLINE HYDROCHLORIDE 100 MILLIGRAM(S): 45 TABLET, EXTENDED RELEASE ORAL at 17:36

## 2018-09-21 RX ADMIN — ENOXAPARIN SODIUM 40 MILLIGRAM(S): 100 INJECTION SUBCUTANEOUS at 12:47

## 2018-09-21 RX ADMIN — LIDOCAINE 1 PATCH: 4 CREAM TOPICAL at 12:28

## 2018-09-21 RX ADMIN — Medication 500000 UNIT(S): at 12:47

## 2018-09-21 RX ADMIN — MORPHINE SULFATE 1 MILLIGRAM(S): 50 CAPSULE, EXTENDED RELEASE ORAL at 16:10

## 2018-09-21 RX ADMIN — MINOCYCLINE HYDROCHLORIDE 100 MILLIGRAM(S): 45 TABLET, EXTENDED RELEASE ORAL at 06:33

## 2018-09-21 RX ADMIN — MORPHINE SULFATE 1 MILLIGRAM(S): 50 CAPSULE, EXTENDED RELEASE ORAL at 16:25

## 2018-09-21 RX ADMIN — Medication 0.5 MILLIGRAM(S): at 23:17

## 2018-09-21 RX ADMIN — Medication 3 MILLILITER(S): at 23:16

## 2018-09-21 RX ADMIN — MUPIROCIN 1 APPLICATION(S): 20 OINTMENT TOPICAL at 17:36

## 2018-09-21 RX ADMIN — Medication 500000 UNIT(S): at 17:36

## 2018-09-21 RX ADMIN — Medication 0.5 MILLIGRAM(S): at 00:01

## 2018-09-21 RX ADMIN — Medication 300 MILLIGRAM(S): at 12:48

## 2018-09-21 RX ADMIN — MORPHINE SULFATE 1 MILLIGRAM(S): 50 CAPSULE, EXTENDED RELEASE ORAL at 21:57

## 2018-09-21 RX ADMIN — HUMAN INSULIN 8 UNIT(S): 100 INJECTION, SUSPENSION SUBCUTANEOUS at 06:34

## 2018-09-21 RX ADMIN — Medication 500000 UNIT(S): at 06:33

## 2018-09-21 RX ADMIN — Medication 3 MILLILITER(S): at 00:01

## 2018-09-21 RX ADMIN — Medication 3 MILLILITER(S): at 12:48

## 2018-09-21 RX ADMIN — Medication 1: at 06:32

## 2018-09-21 RX ADMIN — MORPHINE SULFATE 1 MILLIGRAM(S): 50 CAPSULE, EXTENDED RELEASE ORAL at 23:10

## 2018-09-21 RX ADMIN — Medication 3 MILLILITER(S): at 17:35

## 2018-09-21 RX ADMIN — LIDOCAINE 1 PATCH: 4 CREAM TOPICAL at 22:03

## 2018-09-21 NOTE — PROGRESS NOTE ADULT - PROBLEM SELECTOR PLAN 1
-test BG Q6h  -c/w NPH 8 units QAM at 6am w/prednisone dose  -c/w Humalog low correction scale Q6h  -will need to re-evaluate insulin regimen if taken off tube feeds. If not tolerating feeds, may need to increase surveillance for hypoglycemia and may require D5 infusion.   will monitor

## 2018-09-21 NOTE — PROGRESS NOTE ADULT - SUBJECTIVE AND OBJECTIVE BOX
---___---___---___---___---___---___ ---___---___---___---___---___---___---___---___---___---                    <<<  M E D I C A L   A T T E N D I N G    F O L L O W    U P   N O T E  >>>    has fever no point of origin noted    ---___---___---___---___---___---      <<<  MEDICATIONS:  >>>    MEDICATIONS  (STANDING):  ALBUTerol/ipratropium for Nebulization 3 milliLiter(s) Nebulizer every 6 hours  buDESOnide   0.5 milliGRAM(s) Respule 0.5 milliGRAM(s) Inhalation every 12 hours  chlorhexidine 4% Liquid 1 Application(s) Topical <User Schedule>  clonazePAM Tablet 2 milliGRAM(s) Oral <User Schedule>  dextrose 5%. 1000 milliLiter(s) (50 mL/Hr) IV Continuous <Continuous>  dextrose 50% Injectable 12.5 Gram(s) IV Push once  dextrose 50% Injectable 25 Gram(s) IV Push once  dextrose 50% Injectable 25 Gram(s) IV Push once  enoxaparin Injectable 40 milliGRAM(s) SubCutaneous daily  ergocalciferol Drops 8000 Unit(s) Oral daily  fentaNYL   Patch  12 MICROgram(s)/Hr 1 Patch Transdermal every 72 hours  ferrous    sulfate Liquid 300 milliGRAM(s) Enteral Tube daily  insulin lispro (HumaLOG) corrective regimen sliding scale   SubCutaneous every 6 hours  insulin NPH human recombinant 8 Unit(s) SubCutaneous <User Schedule>  lidocaine   Patch 1 Patch Transdermal every 24 hours  minocycline 100 milliGRAM(s) Oral two times a day  mirtazapine 15 milliGRAM(s) Oral <User Schedule>  mupirocin 2% Ointment 1 Application(s) Topical two times a day  nystatin    Suspension 875202 Unit(s) Oral four times a day  pantoprazole  Injectable 40 milliGRAM(s) IV Push every 24 hours  predniSONE   Tablet 7.5 milliGRAM(s) Oral <User Schedule>  sertraline 50 milliGRAM(s) Oral daily      MEDICATIONS  (PRN):  acetaminophen  IVPB .. 1000 milliGRAM(s) IV Intermittent every 8 hours PRN Mild Pain (1 - 3), Moderate Pain (4 - 6), Severe Pain (7 - 10)  dextrose 40% Gel 15 Gram(s) Oral once PRN Blood Glucose LESS THAN 70 milliGRAM(s)/deciLiter  glucagon  Injectable 1 milliGRAM(s) IntraMuscular once PRN Glucose <70 milliGRAM(s)/deciLiter  morphine  - Injectable 1 milliGRAM(s) IV Push every 4 hours PRN breakthrough pain  nystatin Powder 1 Application(s) Topical two times a day PRN rash/itchiness       ---___---___---___---___---___---     <<<REVIEW OF SYSTEM: >>>    GEN: no fever, no chills, no pain  RESP: no SOB, no cough, no sputum  CVS: no chest pain, no palpitations, no edema  GI: no abdominal pain, no nausea, no vomiting, no constipation, no diarrhea  : no dysurea, no frequency  NEURO: no headache, no dizziness  PSYCH: no depression, not anxious  Derm : no itching, no rash     ---___---___---___---___---___---          <<<  VITAL SIGNS: >>>    T(F): 98.3 (09-21-18 @ 10:00), Max: 102.8 (09-20-18 @ 15:54)  HR: 83 (09-21-18 @ 10:00) (83 - 113)  BP: 123/77 (09-21-18 @ 10:00) (117/70 - 145/80)  RR: 18 (09-21-18 @ 10:00) (18 - 18)  SpO2: 96% (09-21-18 @ 10:00) (92% - 97%)  Wt(kg): --  CAPILLARY BLOOD GLUCOSE      POCT Blood Glucose.: 162 mg/dL (21 Sep 2018 12:44)    I&O's Summary    20 Sep 2018 07:01  -  21 Sep 2018 07:00  --------------------------------------------------------  IN: 420 mL / OUT: 1300 mL / NET: -880 mL         ---___---___---___---___---___---                       PHYSICAL EXAM:    GEN: A&O X 1 , NAD , comfortable  HEENT: NCAT, PERRL, MMM, no scleral icterus, hearing intact  NECK: Supple, No JVD  CVS: S1S2 , regular , No M/R/G appreciated  PULM: CTA B/L,  no W/R/R appreciated  ABD.: soft. non tender, non distended,  bowel sounds present peg noted   Extrem: intact pulses , no edema noted  Derm; wound healing right leg   PSYCH: normal mood,  depression,  anxious     ---___---___---___---___---___---     <<<  LAB AND IMAGING: >>>                          8.2    7.67  )-----------( 341      ( 21 Sep 2018 07:54 )             27.6               09-21    147<H>  |  110<H>  |  76<H>  ----------------------------<  184<H>  3.8   |  26  |  0.81    Ca    10.3      21 Sep 2018 06:58                                 [All pertinent / recent available Imaging reports and other labs reviewed]     ---___---___---___---___---___---___ ---___---___---___---___---           <<<  A S S E S S M E N T   A N D   P L A N :  >>>          -GI/DVT Prophylaxis.    --------------------------------------------  Case discussed with kendra roque   Education given on     >>______________________<<      Deniz Bolden .         phone   [phone]

## 2018-09-21 NOTE — PROGRESS NOTE ADULT - ASSESSMENT
69 yo female with dementia, history of UC, and s/p R Hip hemiarthroplasty 6/22/18, post op hematoma  Admitted after left hip fx, noted severe sepsis, MRSA bacteremia, infected R hip- s/p GABRIEL and spacer  Stormy post op course with subsequent episodes of suspected sepsis- unclear source, course of Cefepime  S/p PEG    9/10 CT of thigh without collection  GAYATHRI negative  CXR reviewed, no obvious signs of pneumonia, trace effusion only  Latest Bld Cxs negative  No obvious explanation for intermittent fever; temps spontaneously improved today, more alert  ?silent aspiration possible  WBC normal    Plan:  Completed 42 days of IV MRSA therapy 9/15, continue MCN via PEG  Wound care per PRS  Follow temps and CBC/diff   Repeat blood cultures if fever accelerates   Follow conservatively off additional empiric Tx  D/w  at bedside- fever, lack of support for secondary infection, conservative approach, all reviewed

## 2018-09-21 NOTE — PROGRESS NOTE ADULT - SUBJECTIVE AND OBJECTIVE BOX
Events noted. Nurse manager reports pt. more alert and talking today. Received prn Morphine      Vital Signs Last 24 Hrs  T(C): 36.8 (21 Sep 2018 13:58), Max: 39.3 (20 Sep 2018 15:54)  T(F): 98.2 (21 Sep 2018 13:58), Max: 102.8 (20 Sep 2018 15:54)  HR: 99 (21 Sep 2018 13:58) (83 - 113)  BP: 125/70 (21 Sep 2018 13:58) (117/70 - 125/70)  BP(mean): --  RR: 18 (21 Sep 2018 13:58) (18 - 18)  SpO2: 97% (21 Sep 2018 13:58) (95% - 97%)                          8.2    7.67  )-----------( 341      ( 21 Sep 2018 07:54 )             27.6       09-21    147<H>  |  110<H>  |  76<H>  ----------------------------<  184<H>  3.8   |  26  |  0.81    Ca    10.3      21 Sep 2018 06:58                MEDICATIONS  (STANDING):  ALBUTerol/ipratropium for Nebulization 3 milliLiter(s) Nebulizer every 6 hours  buDESOnide   0.5 milliGRAM(s) Respule 0.5 milliGRAM(s) Inhalation every 12 hours  chlorhexidine 4% Liquid 1 Application(s) Topical <User Schedule>  clonazePAM Tablet 2 milliGRAM(s) Oral <User Schedule>  dextrose 5%. 1000 milliLiter(s) (50 mL/Hr) IV Continuous <Continuous>  dextrose 50% Injectable 12.5 Gram(s) IV Push once  dextrose 50% Injectable 25 Gram(s) IV Push once  dextrose 50% Injectable 25 Gram(s) IV Push once  enoxaparin Injectable 40 milliGRAM(s) SubCutaneous daily  ergocalciferol Drops 8000 Unit(s) Oral daily  fentaNYL   Patch  12 MICROgram(s)/Hr 1 Patch Transdermal every 72 hours  ferrous    sulfate Liquid 300 milliGRAM(s) Enteral Tube daily  insulin lispro (HumaLOG) corrective regimen sliding scale   SubCutaneous every 6 hours  insulin NPH human recombinant 8 Unit(s) SubCutaneous <User Schedule>  lidocaine   Patch 1 Patch Transdermal every 24 hours  minocycline 100 milliGRAM(s) Oral two times a day  mirtazapine 15 milliGRAM(s) Oral <User Schedule>  mupirocin 2% Ointment 1 Application(s) Topical two times a day  nystatin    Suspension [id number] Unit(s) Oral four times a day  pantoprazole  Injectable 40 milliGRAM(s) IV Push every 24 hours  predniSONE   Tablet 7.5 milliGRAM(s) Oral <User Schedule>  sertraline 50 milliGRAM(s) Oral daily    MEDICATIONS  (PRN):  acetaminophen  IVPB .. 1000 milliGRAM(s) IV Intermittent every 8 hours PRN Mild Pain (1 - 3), Moderate Pain (4 - 6), Severe Pain (7 - 10)  dextrose 40% Gel 15 Gram(s) Oral once PRN Blood Glucose LESS THAN 70 milliGRAM(s)/deciLiter  glucagon  Injectable 1 milliGRAM(s) IntraMuscular once PRN Glucose <70 milliGRAM(s)/deciLiter  morphine  - Injectable 1 milliGRAM(s) IV Push every 4 hours PRN breakthrough pain  nystatin Powder 1 Application(s) Topical two times a day PRN rash/itchiness      Elderly WF in bed, awake, calm, cooperative, alert and oriented x 1.  Insight and judgment are impaired. Speech is minimal (1-2 word responses) but coherent. No hallucinations nor delusions. The patient denied suicidal and homicidal ideation and plan. Mood is euthymic and affect constricted. Attention and concentration, short term memory, and long term memory impaired.

## 2018-09-21 NOTE — PROGRESS NOTE ADULT - SUBJECTIVE AND OBJECTIVE BOX
Chief Complaint: Evaluating this 69 y/o F for uncontrolled Type 2 DM w/ hyperglycemia exacerbated by steroids      Interval History: Feeling better today. Still with low grade fevers. No source yet. No nausea or vomiting. Still on prednisone 7.5mg daily now at 6am with NPH at 6am. Remains on tube feeds.     MEDICATIONS  (STANDING):  ALBUTerol/ipratropium for Nebulization 3 milliLiter(s) Nebulizer every 6 hours  buDESOnide   0.5 milliGRAM(s) Respule 0.5 milliGRAM(s) Inhalation every 12 hours  chlorhexidine 4% Liquid 1 Application(s) Topical <User Schedule>  clonazePAM Tablet 2 milliGRAM(s) Oral <User Schedule>  dextrose 5%. 1000 milliLiter(s) (50 mL/Hr) IV Continuous <Continuous>  dextrose 50% Injectable 12.5 Gram(s) IV Push once  dextrose 50% Injectable 25 Gram(s) IV Push once  dextrose 50% Injectable 25 Gram(s) IV Push once  enoxaparin Injectable 40 milliGRAM(s) SubCutaneous daily  ergocalciferol Drops 8000 Unit(s) Oral daily  fentaNYL   Patch  12 MICROgram(s)/Hr 1 Patch Transdermal every 72 hours  ferrous    sulfate Liquid 300 milliGRAM(s) Enteral Tube daily  insulin lispro (HumaLOG) corrective regimen sliding scale   SubCutaneous every 6 hours  insulin NPH human recombinant 8 Unit(s) SubCutaneous <User Schedule>  lidocaine   Patch 1 Patch Transdermal every 24 hours  minocycline 100 milliGRAM(s) Oral two times a day  mirtazapine 15 milliGRAM(s) Oral <User Schedule>  mupirocin 2% Ointment 1 Application(s) Topical two times a day  nystatin    Suspension 391075 Unit(s) Oral four times a day  pantoprazole  Injectable 40 milliGRAM(s) IV Push every 24 hours  predniSONE   Tablet 7.5 milliGRAM(s) Oral <User Schedule>  sertraline 50 milliGRAM(s) Oral daily    MEDICATIONS  (PRN):  acetaminophen  IVPB .. 1000 milliGRAM(s) IV Intermittent every 8 hours PRN Mild Pain (1 - 3), Moderate Pain (4 - 6), Severe Pain (7 - 10)  dextrose 40% Gel 15 Gram(s) Oral once PRN Blood Glucose LESS THAN 70 milliGRAM(s)/deciLiter  glucagon  Injectable 1 milliGRAM(s) IntraMuscular once PRN Glucose <70 milliGRAM(s)/deciLiter  morphine  - Injectable 1 milliGRAM(s) IV Push every 4 hours PRN breakthrough pain  nystatin Powder 1 Application(s) Topical two times a day PRN rash/itchiness      Allergies    ASA; dye contrast (Anaphylaxis)  aspirin (Short breath)  divalproex sodium (Other (Unknown))  Haldol (Other (Unknown))  penicillin (Short breath; Rash)  sulfa drugs (Short breath; Rash)  Xanax (Other (Unknown))    Intolerances      Review of Systems:  Constitutional: + low grade fevers  Eyes: No blurry vision  Cardiovascular: No chest pain  Respiratory: No SOB  GI: No abdominal pain, No nausea, No vomiting  Endocrine: as noted in HPI    All other negative      PHYSICAL EXAM:  VITALS: T(C): 36.8 (09-21-18 @ 13:58)  T(F): 98.2 (09-21-18 @ 13:58), Max: 102.8 (09-20-18 @ 15:54)  HR: 99 (09-21-18 @ 13:58) (83 - 113)  BP: 125/70 (09-21-18 @ 13:58) (117/70 - 125/70)  RR:  (18 - 18)  SpO2:  (95% - 97%)  Wt(kg): --  GENERAL: NAD at this time, cachectic  EYES: EOMI, No proptosis  HEENT:  Atraumatic, Normocephalic,   RESPIRATORY: full excursion, non labored  CARDIOVASCULAR: Regular rhythm; normal S1/S2, no peripheral edema  GI: Soft, nontender, non distended, normal bowel sounds  SKIN: Warm and dry  PSYCH: normal affect, normal mood      POCT Blood Glucose.: 162 mg/dL (09-21-18 @ 12:44)  POCT Blood Glucose.: 197 mg/dL (09-21-18 @ 05:58)  POCT Blood Glucose.: 160 mg/dL (09-20-18 @ 23:45)  POCT Blood Glucose.: 173 mg/dL (09-20-18 @ 21:25)  POCT Blood Glucose.: 128 mg/dL (09-20-18 @ 17:06)  POCT Blood Glucose.: 186 mg/dL (09-20-18 @ 12:14)  POCT Blood Glucose.: 178 mg/dL (09-20-18 @ 05:58)  POCT Blood Glucose.: 143 mg/dL (09-19-18 @ 23:52)  POCT Blood Glucose.: 124 mg/dL (09-19-18 @ 21:48)  POCT Blood Glucose.: 154 mg/dL (09-19-18 @ 17:14)  POCT Blood Glucose.: 175 mg/dL (09-19-18 @ 12:10)  POCT Blood Glucose.: 219 mg/dL (09-19-18 @ 05:56)  POCT Blood Glucose.: 174 mg/dL (09-18-18 @ 23:53)  POCT Blood Glucose.: 197 mg/dL (09-18-18 @ 21:45)  POCT Blood Glucose.: 106 mg/dL (09-18-18 @ 17:09)        09-21    147<H>  |  110<H>  |  76<H>  ----------------------------<  184<H>  3.8   |  26  |  0.81    EGFR if : 86  EGFR if non : 75    Ca    10.3      09-21          Thyroid Function Tests:      Hemoglobin A1C, Whole Blood: 5.5 % [4.0 - 5.6] (07-27-18 @ 09:02)

## 2018-09-21 NOTE — PROGRESS NOTE ADULT - SUBJECTIVE AND OBJECTIVE BOX
CC: f/u for fever and MRSA right hip infection    Patient reports better day, more alert, afebrile today, tolerating feeds- d/w  at bedside    REVIEW OF SYSTEMS:  All other review of systems negative (Comprehensive ROS)    Antimicrobials Day # s/p 5 days of cefepime  and 42 days of IV MRSA therapy  minocycline 100 milliGRAM(s) Oral two times a day  nystatin    Suspension [id number] Unit(s) Oral four times a day    Other Medications Reviewed    Vital Signs Last 24 Hrs  T(F): 98.2 (21 Sep 2018 13:58), Max: 101.5 (20 Sep 2018 19:24)  HR: 99 (21 Sep 2018 13:58) (83 - 113)  BP: 125/70 (21 Sep 2018 13:58) (117/70 - 125/70)  BP(mean): --  RR: 18 (21 Sep 2018 13:58) (18 - 18)  SpO2: 97% (21 Sep 2018 13:58) (95% - 97%)    PHYSICAL EXAM:  General: lethargic, no acute distress  Eyes:  anicteric, no conjunctival injection, no discharge  Oropharynx: no lesions or injection 	  Neck: without adenopathy  Lungs: diminished at bases  Heart: regular rate and rhythm; no murmur, rubs or gallops  Abdomen: soft, nondistended, nontender, without mass or organomegaly, G tube site clean  Skin: wound VAC distal r thigh incision, otherwise clean and intact  Extremities: no edema  Neurologic: alert, more talkative    LAB RESULTS:                        8.2    7.67  )-----------( 341      ( 21 Sep 2018 07:54 )             27.6   09-    147<H>  |  110<H>  |  76<H>  ----------------------------<  184<H>  3.8   |  26  |  0.81    Ca    10.3      21 Sep 2018 06:58          Urinalysis Basic - ( 19 Sep 2018 08:11 )    Color: Yellow / Appearance: Slightly Turbid / S.018 / pH: x  Gluc: x / Ketone: Negative  / Bili: Negative / Urobili: Negative mg/dL   Blood: x / Protein: 100 mg/dL / Nitrite: Negative   Leuk Esterase: Small / RBC: 73 /HPF / WBC 26 /HPF   Sq Epi: x / Non Sq Epi: 10 /HPF / Bacteria: Few      MICROBIOLOGY:  RECENT CULTURES:   @ 08:39 .Blood Blood-Peripheral     No growth to date.    Culture - Blood (18 @ 08:39)    Specimen Source: .Blood Blood-Peripheral    Culture Results:   No growth to date.    RADIOLOGY REVIEWED:  Xray Chest 1 View- PORTABLE-Urgent (18 @ 12:12) >  Trace left effusion

## 2018-09-21 NOTE — PROGRESS NOTE ADULT - ASSESSMENT
Dementia. Improved mental status today.    Recommend  Continue with current Remeron and Zoloft but hold Remeron if sedated.  Following.    Irvin Reyes M.D.  Psychiatry  [phone]

## 2018-09-21 NOTE — PROGRESS NOTE ADULT - SUBJECTIVE AND OBJECTIVE BOX
Orthopedic Surgery Progress Note  S: Pain is well controlled.  Febrile overnight.    O:  Vital Signs Last 24 Hrs  T(C): 36.8 (21 Sep 2018 06:20), Max: 39.3 (20 Sep 2018 15:54)  T(F): 98.3 (21 Sep 2018 06:20), Max: 102.8 (20 Sep 2018 15:54)  HR: 94 (21 Sep 2018 06:20) (87 - 113)  BP: 117/70 (21 Sep 2018 06:20) (117/70 - 158/89)  RR: 18 (21 Sep 2018 06:20) (18 - 18)  SpO2: 95% (21 Sep 2018 06:20) (92% - 97%)                          7.9    10.25 )-----------( 375      ( 20 Sep 2018 07:43 )             26.0                         7.6    11.2  )-----------( 365      ( 19 Sep 2018 07:06 )             20.6       09-19    144  |  105  |  57<H>  ----------------------------<  172<H>  3.9   |  24  |  0.79    Exam:  Gen: NAD  Motor: wiggles toes  Sensory: SILT DP/SP/S/S/T nerve distributions  Vac in place, rest of wound healing well    A/P: 68 year old female s/p I&D, Explant s/p Distal Femur ORIF    - Pain Control  - PT/OT, OOB  - Follow up XRays  - PICC  - Wound Vac per PRS    Kunal Mcgraw MD

## 2018-09-22 DIAGNOSIS — E87.0 HYPEROSMOLALITY AND HYPERNATREMIA: ICD-10-CM

## 2018-09-22 LAB
ANION GAP SERPL CALC-SCNC: 11 MMOL/L — SIGNIFICANT CHANGE UP (ref 5–17)
BASOPHILS # BLD AUTO: 0.03 K/UL — SIGNIFICANT CHANGE UP (ref 0–0.2)
BASOPHILS NFR BLD AUTO: 0.3 % — SIGNIFICANT CHANGE UP (ref 0–2)
BUN SERPL-MCNC: 80 MG/DL — HIGH (ref 7–23)
CALCIUM SERPL-MCNC: 10.5 MG/DL — SIGNIFICANT CHANGE UP (ref 8.4–10.5)
CHLORIDE SERPL-SCNC: 112 MMOL/L — HIGH (ref 96–108)
CO2 SERPL-SCNC: 28 MMOL/L — SIGNIFICANT CHANGE UP (ref 22–31)
CREAT SERPL-MCNC: 0.76 MG/DL — SIGNIFICANT CHANGE UP (ref 0.5–1.3)
EOSINOPHIL # BLD AUTO: 0.77 K/UL — HIGH (ref 0–0.5)
EOSINOPHIL NFR BLD AUTO: 8.7 % — HIGH (ref 0–6)
GLUCOSE BLDC GLUCOMTR-MCNC: 142 MG/DL — HIGH (ref 70–99)
GLUCOSE BLDC GLUCOMTR-MCNC: 145 MG/DL — HIGH (ref 70–99)
GLUCOSE BLDC GLUCOMTR-MCNC: 148 MG/DL — HIGH (ref 70–99)
GLUCOSE BLDC GLUCOMTR-MCNC: 197 MG/DL — HIGH (ref 70–99)
GLUCOSE BLDC GLUCOMTR-MCNC: 215 MG/DL — HIGH (ref 70–99)
GLUCOSE SERPL-MCNC: 156 MG/DL — HIGH (ref 70–99)
HCT VFR BLD CALC: 27 % — LOW (ref 34.5–45)
HGB BLD-MCNC: 8.1 G/DL — LOW (ref 11.5–15.5)
IMM GRANULOCYTES NFR BLD AUTO: 0.1 % — SIGNIFICANT CHANGE UP (ref 0–1.5)
LYMPHOCYTES # BLD AUTO: 1.26 K/UL — SIGNIFICANT CHANGE UP (ref 1–3.3)
LYMPHOCYTES # BLD AUTO: 14.3 % — SIGNIFICANT CHANGE UP (ref 13–44)
MCHC RBC-ENTMCNC: 30 GM/DL — LOW (ref 32–36)
MCHC RBC-ENTMCNC: 30.6 PG — SIGNIFICANT CHANGE UP (ref 27–34)
MCV RBC AUTO: 101.9 FL — HIGH (ref 80–100)
MONOCYTES # BLD AUTO: 0.53 K/UL — SIGNIFICANT CHANGE UP (ref 0–0.9)
MONOCYTES NFR BLD AUTO: 6 % — SIGNIFICANT CHANGE UP (ref 2–14)
NEUTROPHILS # BLD AUTO: 6.21 K/UL — SIGNIFICANT CHANGE UP (ref 1.8–7.4)
NEUTROPHILS NFR BLD AUTO: 70.6 % — SIGNIFICANT CHANGE UP (ref 43–77)
PLATELET # BLD AUTO: 360 K/UL — SIGNIFICANT CHANGE UP (ref 150–400)
POTASSIUM SERPL-MCNC: 4.1 MMOL/L — SIGNIFICANT CHANGE UP (ref 3.5–5.3)
POTASSIUM SERPL-SCNC: 4.1 MMOL/L — SIGNIFICANT CHANGE UP (ref 3.5–5.3)
RBC # BLD: 2.65 M/UL — LOW (ref 3.8–5.2)
RBC # FLD: 17.9 % — HIGH (ref 10.3–14.5)
SODIUM SERPL-SCNC: 151 MMOL/L — HIGH (ref 135–145)
WBC # BLD: 8.81 K/UL — SIGNIFICANT CHANGE UP (ref 3.8–10.5)
WBC # FLD AUTO: 8.81 K/UL — SIGNIFICANT CHANGE UP (ref 3.8–10.5)

## 2018-09-22 RX ORDER — ACETAMINOPHEN 500 MG
650 TABLET ORAL EVERY 8 HOURS
Qty: 0 | Refills: 0 | Status: DISCONTINUED | OUTPATIENT
Start: 2018-09-22 | End: 2018-10-04

## 2018-09-22 RX ADMIN — MORPHINE SULFATE 1 MILLIGRAM(S): 50 CAPSULE, EXTENDED RELEASE ORAL at 04:39

## 2018-09-22 RX ADMIN — ENOXAPARIN SODIUM 40 MILLIGRAM(S): 100 INJECTION SUBCUTANEOUS at 12:16

## 2018-09-22 RX ADMIN — MIRTAZAPINE 15 MILLIGRAM(S): 45 TABLET, ORALLY DISINTEGRATING ORAL at 22:26

## 2018-09-22 RX ADMIN — MINOCYCLINE HYDROCHLORIDE 100 MILLIGRAM(S): 45 TABLET, EXTENDED RELEASE ORAL at 18:40

## 2018-09-22 RX ADMIN — LIDOCAINE 1 PATCH: 4 CREAM TOPICAL at 22:26

## 2018-09-22 RX ADMIN — MORPHINE SULFATE 1 MILLIGRAM(S): 50 CAPSULE, EXTENDED RELEASE ORAL at 22:43

## 2018-09-22 RX ADMIN — Medication 500000 UNIT(S): at 18:39

## 2018-09-22 RX ADMIN — Medication 2 MILLIGRAM(S): at 22:26

## 2018-09-22 RX ADMIN — MORPHINE SULFATE 1 MILLIGRAM(S): 50 CAPSULE, EXTENDED RELEASE ORAL at 05:01

## 2018-09-22 RX ADMIN — PANTOPRAZOLE SODIUM 40 MILLIGRAM(S): 20 TABLET, DELAYED RELEASE ORAL at 14:11

## 2018-09-22 RX ADMIN — Medication 3 MILLILITER(S): at 12:14

## 2018-09-22 RX ADMIN — Medication 650 MILLIGRAM(S): at 00:39

## 2018-09-22 RX ADMIN — MORPHINE SULFATE 1 MILLIGRAM(S): 50 CAPSULE, EXTENDED RELEASE ORAL at 23:10

## 2018-09-22 RX ADMIN — Medication 500000 UNIT(S): at 06:43

## 2018-09-22 RX ADMIN — Medication 500000 UNIT(S): at 12:17

## 2018-09-22 RX ADMIN — Medication 1: at 12:36

## 2018-09-22 RX ADMIN — Medication 0.5 MILLIGRAM(S): at 12:16

## 2018-09-22 RX ADMIN — MUPIROCIN 1 APPLICATION(S): 20 OINTMENT TOPICAL at 06:42

## 2018-09-22 RX ADMIN — HUMAN INSULIN 8 UNIT(S): 100 INJECTION, SUSPENSION SUBCUTANEOUS at 06:41

## 2018-09-22 RX ADMIN — Medication 7.5 MILLIGRAM(S): at 06:43

## 2018-09-22 RX ADMIN — CHLORHEXIDINE GLUCONATE 1 APPLICATION(S): 213 SOLUTION TOPICAL at 12:15

## 2018-09-22 RX ADMIN — MORPHINE SULFATE 1 MILLIGRAM(S): 50 CAPSULE, EXTENDED RELEASE ORAL at 11:27

## 2018-09-22 RX ADMIN — ERGOCALCIFEROL 8000 UNIT(S): 1.25 CAPSULE ORAL at 18:38

## 2018-09-22 RX ADMIN — MORPHINE SULFATE 1 MILLIGRAM(S): 50 CAPSULE, EXTENDED RELEASE ORAL at 10:57

## 2018-09-22 RX ADMIN — LIDOCAINE 1 PATCH: 4 CREAM TOPICAL at 10:30

## 2018-09-22 RX ADMIN — MORPHINE SULFATE 1 MILLIGRAM(S): 50 CAPSULE, EXTENDED RELEASE ORAL at 14:40

## 2018-09-22 RX ADMIN — Medication 3 MILLILITER(S): at 06:41

## 2018-09-22 RX ADMIN — MINOCYCLINE HYDROCHLORIDE 100 MILLIGRAM(S): 45 TABLET, EXTENDED RELEASE ORAL at 06:42

## 2018-09-22 RX ADMIN — MUPIROCIN 1 APPLICATION(S): 20 OINTMENT TOPICAL at 18:41

## 2018-09-22 RX ADMIN — Medication 300 MILLIGRAM(S): at 12:16

## 2018-09-22 RX ADMIN — MORPHINE SULFATE 1 MILLIGRAM(S): 50 CAPSULE, EXTENDED RELEASE ORAL at 14:10

## 2018-09-22 RX ADMIN — SERTRALINE 50 MILLIGRAM(S): 25 TABLET, FILM COATED ORAL at 12:18

## 2018-09-22 RX ADMIN — Medication 3 MILLILITER(S): at 18:39

## 2018-09-22 NOTE — PROGRESS NOTE ADULT - ASSESSMENT
67 yo female with dementia, history of UC, and s/p R Hip hemiarthroplasty 6/22/18, post op hematoma  Admitted after left hip fx, noted severe sepsis, MRSA bacteremia, infected R hip- s/p GABRIEL and spacer  Stormy post op course with subsequent episodes of suspected sepsis- unclear source, course of Cefepime  S/p PEG    9/10 CT of thigh without collection  GAYATHRI negative  CXR reviewed, no obvious signs of pneumonia, trace effusion only  Latest Bld Cxs 9/19 negative  No obvious explanation for intermittent fever; Tmx 100.5  Silent aspiration possible  WBC normal    Plan:  Completed 42 days of IV MRSA therapy 9/15, continue MCN via PEG  Wound care per PRS  Follow temps and CBC/diff   Repeat blood cultures if fever accelerates   Follow conservatively off additional empiric Tx

## 2018-09-22 NOTE — PROGRESS NOTE ADULT - ASSESSMENT
wound dehiscence    dementia     fuo     mrsa sepsis     hypernatremia     drug induced hyperglycemia

## 2018-09-22 NOTE — PROGRESS NOTE ADULT - SUBJECTIVE AND OBJECTIVE BOX
---___---___---___---___---___---___ ---___---___---___---___---___---___---___---___---___---                    <<<  M E D I C A L   A T T E N D I N G    F O L L O W    U P   N O T E  >>>  remains unchanged no fever . point of origin if fever unkniwn      ---___---___---___---___---___---      <<<  MEDICATIONS:  >>>    MEDICATIONS  (STANDING):  ALBUTerol/ipratropium for Nebulization 3 milliLiter(s) Nebulizer every 6 hours  buDESOnide   0.5 milliGRAM(s) Respule 0.5 milliGRAM(s) Inhalation every 12 hours  chlorhexidine 4% Liquid 1 Application(s) Topical <User Schedule>  clonazePAM Tablet 2 milliGRAM(s) Oral <User Schedule>  dextrose 5%. 1000 milliLiter(s) (50 mL/Hr) IV Continuous <Continuous>  dextrose 50% Injectable 12.5 Gram(s) IV Push once  dextrose 50% Injectable 25 Gram(s) IV Push once  dextrose 50% Injectable 25 Gram(s) IV Push once  enoxaparin Injectable 40 milliGRAM(s) SubCutaneous daily  ergocalciferol Drops 8000 Unit(s) Oral daily  fentaNYL   Patch  12 MICROgram(s)/Hr 1 Patch Transdermal every 72 hours  ferrous    sulfate Liquid 300 milliGRAM(s) Enteral Tube daily  insulin lispro (HumaLOG) corrective regimen sliding scale   SubCutaneous every 6 hours  insulin NPH human recombinant 8 Unit(s) SubCutaneous <User Schedule>  lidocaine   Patch 1 Patch Transdermal every 24 hours  minocycline 100 milliGRAM(s) Oral two times a day  mirtazapine 15 milliGRAM(s) Oral <User Schedule>  mupirocin 2% Ointment 1 Application(s) Topical two times a day  nystatin    Suspension [id number] Unit(s) Oral four times a day  pantoprazole  Injectable 40 milliGRAM(s) IV Push every 24 hours  predniSONE   Tablet 7.5 milliGRAM(s) Oral <User Schedule>  sertraline 50 milliGRAM(s) Oral daily      MEDICATIONS  (PRN):  acetaminophen    Suspension .. 650 milliGRAM(s) Oral every 8 hours PRN Mild Pain (1 - 3)  acetaminophen  IVPB .. 1000 milliGRAM(s) IV Intermittent every 8 hours PRN Mild Pain (1 - 3), Moderate Pain (4 - 6), Severe Pain (7 - 10)  dextrose 40% Gel 15 Gram(s) Oral once PRN Blood Glucose LESS THAN 70 milliGRAM(s)/deciLiter  glucagon  Injectable 1 milliGRAM(s) IntraMuscular once PRN Glucose <70 milliGRAM(s)/deciLiter  morphine  - Injectable 1 milliGRAM(s) IV Push every 4 hours PRN breakthrough pain  nystatin Powder 1 Application(s) Topical two times a day PRN rash/itchiness       ---___---___---___---___---___---     <<<REVIEW OF SYSTEM: >>>    GEN: no fever, no chills, no pain  RESP: no SOB, no cough, no sputum  CVS: no chest pain, no palpitations, no edema  GI: no abdominal pain, no nausea, no vomiting, no constipation, no diarrhea  : no dysurea, no frequency  NEURO: no headache, no dizziness  PSYCH: no depression, not anxious  Derm : no itching, no rash     ---___---___---___---___---___---          <<<  VITAL SIGNS: >>>    T(F): 100.4 (09-22-18 @ 12:12), Max: 100.5 (09-21-18 @ 22:25)  HR: 101 (09-22-18 @ 09:18) (94 - 103)  BP: 102/52 (09-22-18 @ 09:18) (94/58 - 134/75)  RR: 18 (09-22-18 @ 09:18) (16 - 18)  SpO2: 94% (09-22-18 @ 09:18) (94% - 100%)  Wt(kg): --  CAPILLARY BLOOD GLUCOSE      POCT Blood Glucose.: 197 mg/dL (22 Sep 2018 12:33)    I&O's Summary    21 Sep 2018 07:01  -  22 Sep 2018 07:00  --------------------------------------------------------  IN: 600 mL / OUT: 850 mL / NET: -250 mL         ---___---___---___---___---___---                       PHYSICAL EXAM:    GEN: A&O X 1 , NAD , comfortable  HEENT: NCAT, PERRL, MMM, no scleral icterus, hearing intact  NECK: Supple, No JVD  CVS: S1S2 , regular , No M/R/G appreciated  PULM: CTA B/L,  no W/R/R appreciated  ABD.: soft. non tender, non distended,  bowel sounds present peg noted   Extrem: intact pulses , no edema noted  Derm: wound still healing right side   PSYCH: normal mood, no depression,  anxious     ---___---___---___---___---___---     <<<  LAB AND IMAGING: >>>                          8.1    8.81  )-----------( 360      ( 22 Sep 2018 08:06 )             27.0               09-22    151<H>  |  112<H>  |  80<H>  ----------------------------<  156<H>  4.1   |  28  |  0.76    Ca    10.5      22 Sep 2018 07:06                                 [All pertinent / recent available Imaging reports and other labs reviewed]     ---___---___---___---___---___---___ ---___---___---___---___---           <<<  A S S E S S M E N T   A N D   P L A N :  >>>          -GI/DVT Prophylaxis.    --------------------------------------------  Case discussed with family member   Education given on     >>______________________<<      Deniz Bolden .         phone   [phone]

## 2018-09-22 NOTE — PROGRESS NOTE ADULT - SUBJECTIVE AND OBJECTIVE BOX
CC: f/u for fever and MRSA right hip infection    Patient somnolent, no distress, tolerating feeds    REVIEW OF SYSTEMS:  All other review of systems negative (Comprehensive ROS)- limited    Antimicrobials Day # s/p 5 days of cefepime  and 42 days of IV MRSA therapy  minocycline 100 milliGRAM(s) Oral two times a day  nystatin    Suspension [id number] Unit(s) Oral four times a day    Other Medications Reviewed    Vital Signs Last 24 Hrs  T(F): 100.5 (22 Sep 2018 16:33), Max: 100.5 (21 Sep 2018 22:25)  HR: 108 (22 Sep 2018 16:33) (94 - 108)  BP: 139/81 (22 Sep 2018 16:33) (94/58 - 139/81)  BP(mean): --  RR: 19 (22 Sep 2018 16:33) (16 - 19)  SpO2: 96% (22 Sep 2018 16:33) (94% - 100%)    PHYSICAL EXAM:  General: lethargic, no acute distress  Eyes:  anicteric, no conjunctival injection, no discharge  Oropharynx: no lesions or injection 	  Neck: without adenopathy  Lungs: diminished at bases  Heart: regular rate and rhythm; no murmur, rubs or gallops  Abdomen: soft, nondistended, nontender, without mass or organomegaly, G tube site clean  Skin: wound VAC distal r thigh incision, otherwise clean and intact  Extremities: no edema  Neurologic: alert, more talkative    LAB RESULTS:                        8.2    7.67  )-----------( 341      ( 21 Sep 2018 07:54 )             27.6   09-21    147<H>  |  110<H>  |  76<H>  ----------------------------<  184<H>  3.8   |  26  |  0.81    Ca    10.3      21 Sep 2018 06:58    Urinalysis Basic - ( 19 Sep 2018 08:11 )    Color: Yellow / Appearance: Slightly Turbid / S.018 / pH: x  Gluc: x / Ketone: Negative  / Bili: Negative / Urobili: Negative mg/dL   Blood: x / Protein: 100 mg/dL / Nitrite: Negative   Leuk Esterase: Small / RBC: 73 /HPF / WBC 26 /HPF   Sq Epi: x / Non Sq Epi: 10 /HPF / Bacteria: Few      MICROBIOLOGY:  RECENT CULTURES:   @ 08:39 .Blood Blood-Peripheral     No growth to date.    Culture - Blood (18 @ 08:39)    Specimen Source: .Blood Blood-Peripheral    Culture Results:   No growth to date.    RADIOLOGY REVIEWED:  Xray Chest 1 View- PORTABLE-Urgent (18 @ 12:12) >  Trace left effusion

## 2018-09-23 DIAGNOSIS — E87.0 HYPEROSMOLALITY AND HYPERNATREMIA: ICD-10-CM

## 2018-09-23 DIAGNOSIS — R79.89 OTHER SPECIFIED ABNORMAL FINDINGS OF BLOOD CHEMISTRY: ICD-10-CM

## 2018-09-23 LAB
ANION GAP SERPL CALC-SCNC: 12 MMOL/L — SIGNIFICANT CHANGE UP (ref 5–17)
BASOPHILS # BLD AUTO: 0.02 K/UL — SIGNIFICANT CHANGE UP (ref 0–0.2)
BASOPHILS NFR BLD AUTO: 0.2 % — SIGNIFICANT CHANGE UP (ref 0–2)
BUN SERPL-MCNC: 78 MG/DL — HIGH (ref 7–23)
CALCIUM SERPL-MCNC: 10.4 MG/DL — SIGNIFICANT CHANGE UP (ref 8.4–10.5)
CHLORIDE SERPL-SCNC: 110 MMOL/L — HIGH (ref 96–108)
CO2 SERPL-SCNC: 29 MMOL/L — SIGNIFICANT CHANGE UP (ref 22–31)
CREAT SERPL-MCNC: 0.8 MG/DL — SIGNIFICANT CHANGE UP (ref 0.5–1.3)
EOSINOPHIL # BLD AUTO: 0.62 K/UL — HIGH (ref 0–0.5)
EOSINOPHIL NFR BLD AUTO: 7.7 % — HIGH (ref 0–6)
GLUCOSE BLDC GLUCOMTR-MCNC: 131 MG/DL — HIGH (ref 70–99)
GLUCOSE BLDC GLUCOMTR-MCNC: 142 MG/DL — HIGH (ref 70–99)
GLUCOSE BLDC GLUCOMTR-MCNC: 159 MG/DL — HIGH (ref 70–99)
GLUCOSE BLDC GLUCOMTR-MCNC: 162 MG/DL — HIGH (ref 70–99)
GLUCOSE BLDC GLUCOMTR-MCNC: 207 MG/DL — HIGH (ref 70–99)
GLUCOSE SERPL-MCNC: 166 MG/DL — HIGH (ref 70–99)
HCT VFR BLD CALC: 28.2 % — LOW (ref 34.5–45)
HGB BLD-MCNC: 8.3 G/DL — LOW (ref 11.5–15.5)
IMM GRANULOCYTES NFR BLD AUTO: 0.1 % — SIGNIFICANT CHANGE UP (ref 0–1.5)
LYMPHOCYTES # BLD AUTO: 1.26 K/UL — SIGNIFICANT CHANGE UP (ref 1–3.3)
LYMPHOCYTES # BLD AUTO: 15.6 % — SIGNIFICANT CHANGE UP (ref 13–44)
MCHC RBC-ENTMCNC: 29.4 GM/DL — LOW (ref 32–36)
MCHC RBC-ENTMCNC: 30.2 PG — SIGNIFICANT CHANGE UP (ref 27–34)
MCV RBC AUTO: 102.5 FL — HIGH (ref 80–100)
MONOCYTES # BLD AUTO: 0.6 K/UL — SIGNIFICANT CHANGE UP (ref 0–0.9)
MONOCYTES NFR BLD AUTO: 7.4 % — SIGNIFICANT CHANGE UP (ref 2–14)
NEUTROPHILS # BLD AUTO: 5.58 K/UL — SIGNIFICANT CHANGE UP (ref 1.8–7.4)
NEUTROPHILS NFR BLD AUTO: 69 % — SIGNIFICANT CHANGE UP (ref 43–77)
PLATELET # BLD AUTO: 375 K/UL — SIGNIFICANT CHANGE UP (ref 150–400)
POTASSIUM SERPL-MCNC: 3.9 MMOL/L — SIGNIFICANT CHANGE UP (ref 3.5–5.3)
POTASSIUM SERPL-SCNC: 3.9 MMOL/L — SIGNIFICANT CHANGE UP (ref 3.5–5.3)
RBC # BLD: 2.75 M/UL — LOW (ref 3.8–5.2)
RBC # FLD: 18 % — HIGH (ref 10.3–14.5)
SODIUM SERPL-SCNC: 151 MMOL/L — HIGH (ref 135–145)
SODIUM UR-SCNC: 99 MMOL/L — SIGNIFICANT CHANGE UP
WBC # BLD: 8.09 K/UL — SIGNIFICANT CHANGE UP (ref 3.8–10.5)
WBC # FLD AUTO: 8.09 K/UL — SIGNIFICANT CHANGE UP (ref 3.8–10.5)

## 2018-09-23 PROCEDURE — 99233 SBSQ HOSP IP/OBS HIGH 50: CPT

## 2018-09-23 RX ORDER — ACETAMINOPHEN 500 MG
500 TABLET ORAL ONCE
Qty: 0 | Refills: 0 | Status: COMPLETED | OUTPATIENT
Start: 2018-09-23 | End: 2018-09-23

## 2018-09-23 RX ADMIN — FENTANYL CITRATE 1 PATCH: 50 INJECTION INTRAVENOUS at 11:42

## 2018-09-23 RX ADMIN — MORPHINE SULFATE 1 MILLIGRAM(S): 50 CAPSULE, EXTENDED RELEASE ORAL at 07:49

## 2018-09-23 RX ADMIN — MORPHINE SULFATE 1 MILLIGRAM(S): 50 CAPSULE, EXTENDED RELEASE ORAL at 22:13

## 2018-09-23 RX ADMIN — Medication 500000 UNIT(S): at 06:21

## 2018-09-23 RX ADMIN — Medication 1 DROP(S): at 16:33

## 2018-09-23 RX ADMIN — Medication 3 MILLILITER(S): at 11:37

## 2018-09-23 RX ADMIN — Medication 1 DROP(S): at 23:05

## 2018-09-23 RX ADMIN — Medication 0.5 MILLIGRAM(S): at 11:37

## 2018-09-23 RX ADMIN — ENOXAPARIN SODIUM 40 MILLIGRAM(S): 100 INJECTION SUBCUTANEOUS at 11:37

## 2018-09-23 RX ADMIN — Medication 3 MILLILITER(S): at 17:19

## 2018-09-23 RX ADMIN — MINOCYCLINE HYDROCHLORIDE 100 MILLIGRAM(S): 45 TABLET, EXTENDED RELEASE ORAL at 06:21

## 2018-09-23 RX ADMIN — Medication 3 MILLILITER(S): at 06:19

## 2018-09-23 RX ADMIN — Medication 500000 UNIT(S): at 15:03

## 2018-09-23 RX ADMIN — Medication 500000 UNIT(S): at 00:05

## 2018-09-23 RX ADMIN — MINOCYCLINE HYDROCHLORIDE 100 MILLIGRAM(S): 45 TABLET, EXTENDED RELEASE ORAL at 17:19

## 2018-09-23 RX ADMIN — HUMAN INSULIN 8 UNIT(S): 100 INJECTION, SUSPENSION SUBCUTANEOUS at 06:36

## 2018-09-23 RX ADMIN — MORPHINE SULFATE 1 MILLIGRAM(S): 50 CAPSULE, EXTENDED RELEASE ORAL at 11:52

## 2018-09-23 RX ADMIN — LIDOCAINE 1 PATCH: 4 CREAM TOPICAL at 23:06

## 2018-09-23 RX ADMIN — MUPIROCIN 1 APPLICATION(S): 20 OINTMENT TOPICAL at 17:19

## 2018-09-23 RX ADMIN — Medication 0.5 MILLIGRAM(S): at 00:05

## 2018-09-23 RX ADMIN — MORPHINE SULFATE 1 MILLIGRAM(S): 50 CAPSULE, EXTENDED RELEASE ORAL at 16:20

## 2018-09-23 RX ADMIN — MORPHINE SULFATE 1 MILLIGRAM(S): 50 CAPSULE, EXTENDED RELEASE ORAL at 08:20

## 2018-09-23 RX ADMIN — Medication 200 MILLIGRAM(S): at 16:42

## 2018-09-23 RX ADMIN — Medication 7.5 MILLIGRAM(S): at 06:22

## 2018-09-23 RX ADMIN — Medication 3 MILLILITER(S): at 00:04

## 2018-09-23 RX ADMIN — Medication 1: at 06:37

## 2018-09-23 RX ADMIN — MUPIROCIN 1 APPLICATION(S): 20 OINTMENT TOPICAL at 06:21

## 2018-09-23 RX ADMIN — MORPHINE SULFATE 1 MILLIGRAM(S): 50 CAPSULE, EXTENDED RELEASE ORAL at 22:28

## 2018-09-23 RX ADMIN — PANTOPRAZOLE SODIUM 40 MILLIGRAM(S): 20 TABLET, DELAYED RELEASE ORAL at 15:01

## 2018-09-23 RX ADMIN — Medication 1: at 19:08

## 2018-09-23 RX ADMIN — MIRTAZAPINE 15 MILLIGRAM(S): 45 TABLET, ORALLY DISINTEGRATING ORAL at 23:06

## 2018-09-23 RX ADMIN — LIDOCAINE 1 PATCH: 4 CREAM TOPICAL at 10:00

## 2018-09-23 RX ADMIN — Medication 300 MILLIGRAM(S): at 11:37

## 2018-09-23 RX ADMIN — MORPHINE SULFATE 1 MILLIGRAM(S): 50 CAPSULE, EXTENDED RELEASE ORAL at 16:50

## 2018-09-23 RX ADMIN — ERGOCALCIFEROL 8000 UNIT(S): 1.25 CAPSULE ORAL at 11:36

## 2018-09-23 RX ADMIN — CHLORHEXIDINE GLUCONATE 1 APPLICATION(S): 213 SOLUTION TOPICAL at 06:19

## 2018-09-23 RX ADMIN — MORPHINE SULFATE 1 MILLIGRAM(S): 50 CAPSULE, EXTENDED RELEASE ORAL at 11:22

## 2018-09-23 RX ADMIN — Medication 500000 UNIT(S): at 17:20

## 2018-09-23 RX ADMIN — Medication 2 MILLIGRAM(S): at 23:10

## 2018-09-23 RX ADMIN — SERTRALINE 50 MILLIGRAM(S): 25 TABLET, FILM COATED ORAL at 11:38

## 2018-09-23 NOTE — PROGRESS NOTE ADULT - SUBJECTIVE AND OBJECTIVE BOX
[facility] DIVISION OF KIDNEY DISEASES AND HYPERTENSION -- FOLLOW UP NOTE  --------------------------------------------------------------------------------  Chief Complaint:/subjective: reconsulted for hypernatremia and high BUN    24 hour events: no acute events        PAST HISTORY  --------------------------------------------------------------------------------  No significant changes to PMH, PSH, FHx, SHx, unless otherwise noted    ALLERGIES & MEDICATIONS  --------------------------------------------------------------------------------  Allergies    ASA; dye contrast (Anaphylaxis)  aspirin (Short breath)  divalproex sodium (Other (Unknown))  Haldol (Other (Unknown))  penicillin (Short breath; Rash)  sulfa drugs (Short breath; Rash)  Xanax (Other (Unknown))    Intolerances      Standing Inpatient Medications  acetaminophen  IVPB .. 500 milliGRAM(s) IV Intermittent once  ALBUTerol/ipratropium for Nebulization 3 milliLiter(s) Nebulizer every 6 hours  artificial tears (preservative free) Ophthalmic Solution 1 Drop(s) Both EYES three times a day  buDESOnide   0.5 milliGRAM(s) Respule 0.5 milliGRAM(s) Inhalation every 12 hours  chlorhexidine 4% Liquid 1 Application(s) Topical <User Schedule>  clonazePAM Tablet 2 milliGRAM(s) Oral <User Schedule>  dextrose 5%. 1000 milliLiter(s) IV Continuous <Continuous>  dextrose 50% Injectable 12.5 Gram(s) IV Push once  dextrose 50% Injectable 25 Gram(s) IV Push once  dextrose 50% Injectable 25 Gram(s) IV Push once  enoxaparin Injectable 40 milliGRAM(s) SubCutaneous daily  ergocalciferol Drops 8000 Unit(s) Oral daily  fentaNYL   Patch  12 MICROgram(s)/Hr 1 Patch Transdermal every 72 hours  ferrous    sulfate Liquid 300 milliGRAM(s) Enteral Tube daily  insulin lispro (HumaLOG) corrective regimen sliding scale   SubCutaneous every 6 hours  insulin NPH human recombinant 8 Unit(s) SubCutaneous <User Schedule>  lidocaine   Patch 1 Patch Transdermal every 24 hours  minocycline 100 milliGRAM(s) Oral two times a day  mirtazapine 15 milliGRAM(s) Oral <User Schedule>  mupirocin 2% Ointment 1 Application(s) Topical two times a day  nystatin    Suspension [id number] Unit(s) Oral four times a day  pantoprazole  Injectable 40 milliGRAM(s) IV Push every 24 hours  predniSONE   Tablet 7.5 milliGRAM(s) Oral <User Schedule>  sertraline 50 milliGRAM(s) Oral daily    PRN Inpatient Medications  acetaminophen    Suspension .. 650 milliGRAM(s) Oral every 8 hours PRN  acetaminophen  IVPB .. 1000 milliGRAM(s) IV Intermittent every 8 hours PRN  dextrose 40% Gel 15 Gram(s) Oral once PRN  glucagon  Injectable 1 milliGRAM(s) IntraMuscular once PRN  morphine  - Injectable 1 milliGRAM(s) IV Push every 4 hours PRN  nystatin Powder 1 Application(s) Topical two times a day PRN      REVIEW OF SYSTEMS  --------------------------------------------------------------------------------  Gen: No weight changes, fatigue, fevers/chills, weakness  Skin: No rashes  Head/Eyes/Ears/Mouth: No headache;   Respiratory: No dyspnea, cough  CV: No chest pain, PND, orthopnea  GI: No abdominal pain, diarrhea, constipation, nausea, vomiting  : No increased frequency, dysuria, hematuria, nocturia  MSK: No joint pain/swelling; no back pain; no edema  Neuro: No dizziness/lightheadedness, weakness  Heme: No easy bruising or bleeding  Psych: No significant nervousness, anxiety, stress, depression    All other systems were reviewed and are negative, except as noted.    VITALS/PHYSICAL EXAM  --------------------------------------------------------------------------------  T(C): 36.8 (09-23-18 @ 14:38), Max: 38.1 (09-22-18 @ 16:33)  HR: 102 (09-23-18 @ 10:27) (74 - 108)  BP: 134/78 (09-23-18 @ 14:38) (97/60 - 139/81)  RR: 18 (09-23-18 @ 10:27) (18 - 20)  SpO2: 98% (09-23-18 @ 10:27) (96% - 100%)  Wt(kg): --  Adult Advanced Hemodynamics Last 24 Hrs  ABP: --  ABP(mean): --  CVP(mm Hg): --  CO: --  CI: --  PA: --  PA(mean): --  PCWP: --  SVR: --  SVRI: --        09-22-18 @ 07:01  -  09-23-18 @ 07:00  --------------------------------------------------------  IN: 1345 mL / OUT: 1050 mL / NET: 295 mL    09-23-18 @ 07:01  -  09-23-18 @ 16:29  --------------------------------------------------------  IN: 0 mL / OUT: 300 mL / NET: -300 mL      Physical Exam:  	Gen: awake  	HEENT: no jvp; dry MM  	Pulm: CTA B/L  	CV: RRR, S1S2; no rub  	Back:   no sacral edema  	Abd: +BS, soft,    	: No suprapubic tenderness; +gavin  	Ext: no edema  	Neuro: alert  	Psych: alert  	Skin: Warm   	Vascular access:    LABS/STUDIES  --------------------------------------------------------------------------------              8.3    8.09  >-----------<  375      [09-23-18 @ 09:09]              28.2     Hemoglobin: 8.3 g/dL (09-23-18 @ 09:09)  Hemoglobin: 8.1 g/dL (09-22-18 @ 08:06)    Platelet Count - Automated: 375 K/uL (09-23-18 @ 09:09)  Platelet Count - Automated: 360 K/uL (09-22-18 @ 08:06)    151  |  110  |  78  ----------------------------<  166      [09-23-18 @ 07:29]  3.9   |  29  |  0.80        Ca     10.4     [09-23-18 @ 07:29]            Creatinine Trend:  SCr 0.80 [09-23 @ 07:29]  SCr 0.76 [09-22 @ 07:06]  SCr 0.81 [09-21 @ 06:58]  SCr 0.79 [09-19 @ 07:06]  SCr 0.82 [09-17 @ 06:37]    Urinalysis - [09-19-18 @ 08:11]      Color Yellow / Appearance Slightly Turbid / SG 1.018 / pH 6.0      Gluc Negative / Ketone Negative  / Bili Negative / Urobili Negative       Blood Moderate / Protein 100 / Leuk Est Small / Nitrite Negative      RBC 73 / WBC 26 / Hyaline 3 / Gran 5 / Sq Epi  / Non Sq Epi 10 / Bacteria Few      Iron 14, TIBC 85, %sat 16      [08-14-18 @ 05:07]  PTH -- (Ca 8.6)      [09-11-18 @ 09:14]   52  Vitamin D (25OH) 18.9      [09-11-18 @ 09:04]  HbA1c 5.5      [07-27-18 @ 09:02]  TSH 0.95      [07-27-18 @ 09:02]

## 2018-09-23 NOTE — PROGRESS NOTE ADULT - ATTENDING COMMENTS
Hypernatremia and prerenal azotemia  -in the setting of volume depletion  -please start free water via  q6hr  -may need IVF  -has leslye    Increased BUN: prerenal+steroids+feeds which are high in protein--change feeds to lower protein feeds- nutrition follow up

## 2018-09-23 NOTE — PROGRESS NOTE ADULT - SUBJECTIVE AND OBJECTIVE BOX
---___---___---___---___---___---___ ---___---___---___---___---___---___---___---___---___---                    <<<  M E D I C A L   A T T E N D I N G    F O L L O W    U P   N O T E  >>>  patient fever decreased has not had any since yesterday afternoon . has been stable otherwise    ---___---___---___---___---___---      <<<  MEDICATIONS:  >>>    MEDICATIONS  (STANDING):  ALBUTerol/ipratropium for Nebulization 3 milliLiter(s) Nebulizer every 6 hours  buDESOnide   0.5 milliGRAM(s) Respule 0.5 milliGRAM(s) Inhalation every 12 hours  chlorhexidine 4% Liquid 1 Application(s) Topical <User Schedule>  clonazePAM Tablet 2 milliGRAM(s) Oral <User Schedule>  dextrose 5%. 1000 milliLiter(s) (50 mL/Hr) IV Continuous <Continuous>  dextrose 50% Injectable 12.5 Gram(s) IV Push once  dextrose 50% Injectable 25 Gram(s) IV Push once  dextrose 50% Injectable 25 Gram(s) IV Push once  enoxaparin Injectable 40 milliGRAM(s) SubCutaneous daily  ergocalciferol Drops 8000 Unit(s) Oral daily  fentaNYL   Patch  12 MICROgram(s)/Hr 1 Patch Transdermal every 72 hours  ferrous    sulfate Liquid 300 milliGRAM(s) Enteral Tube daily  insulin lispro (HumaLOG) corrective regimen sliding scale   SubCutaneous every 6 hours  insulin NPH human recombinant 8 Unit(s) SubCutaneous <User Schedule>  lidocaine   Patch 1 Patch Transdermal every 24 hours  minocycline 100 milliGRAM(s) Oral two times a day  mirtazapine 15 milliGRAM(s) Oral <User Schedule>  mupirocin 2% Ointment 1 Application(s) Topical two times a day  nystatin    Suspension [id number] Unit(s) Oral four times a day  pantoprazole  Injectable 40 milliGRAM(s) IV Push every 24 hours  predniSONE   Tablet 7.5 milliGRAM(s) Oral <User Schedule>  sertraline 50 milliGRAM(s) Oral daily      MEDICATIONS  (PRN):  acetaminophen    Suspension .. 650 milliGRAM(s) Oral every 8 hours PRN Mild Pain (1 - 3)  acetaminophen  IVPB .. 1000 milliGRAM(s) IV Intermittent every 8 hours PRN Mild Pain (1 - 3), Moderate Pain (4 - 6), Severe Pain (7 - 10)  dextrose 40% Gel 15 Gram(s) Oral once PRN Blood Glucose LESS THAN 70 milliGRAM(s)/deciLiter  glucagon  Injectable 1 milliGRAM(s) IntraMuscular once PRN Glucose <70 milliGRAM(s)/deciLiter  morphine  - Injectable 1 milliGRAM(s) IV Push every 4 hours PRN breakthrough pain  nystatin Powder 1 Application(s) Topical two times a day PRN rash/itchiness       ---___---___---___---___---___---     <<<REVIEW OF SYSTEM: >>>    GEN: no fever, no chills, no pain  RESP: no SOB, no cough, no sputum  CVS: no chest pain, no palpitations, no edema  GI: no abdominal pain, no nausea, no vomiting, no constipation, no diarrhea  : no dysurea, no frequency  NEURO: no headache, no dizziness  PSYCH: no depression, not anxious  Derm : no itching, no rash     ---___---___---___---___---___---          <<<  VITAL SIGNS: >>>    T(F): 97.6 (09-23-18 @ 06:33), Max: 100.5 (09-22-18 @ 16:33)  HR: 74 (09-23-18 @ 06:33) (74 - 108)  BP: 97/60 (09-23-18 @ 06:33) (97/60 - 139/81)  RR: 18 (09-23-18 @ 06:33) (18 - 20)  SpO2: 98% (09-23-18 @ 06:33) (94% - 100%)  Wt(kg): --  CAPILLARY BLOOD GLUCOSE      POCT Blood Glucose.: 162 mg/dL (23 Sep 2018 06:20)    I&O's Summary    22 Sep 2018 07:01  -  23 Sep 2018 07:00  --------------------------------------------------------  IN: 1345 mL / OUT: 1050 mL / NET: 295 mL         ---___---___---___---___---___---                       PHYSICAL EXAM:    GEN: A&O X 1 , NAD , comfortable thin cachectic female   HEENT: NCAT, PERRL, MMM, no scleral icterus, hearing intact  NECK: Supple, No JVD  CVS: S1S2 , regular , No M/R/G appreciated  PULM: CTA B/L,  no W/R/R appreciated  ABD.: soft. non tender, non distended,  bowel sounds present peg noted   Extrem: intact pulses , no edema noted  Derm: No rash or ecchymosis noted wound right leg healing   PSYCH: normal mood, no depression,  t anxious     ---___---___---___---___---___---     <<<  LAB AND IMAGING: >>>                          8.1    8.81  )-----------( 360      ( 22 Sep 2018 08:06 )             27.0               09-22    151<H>  |  112<H>  |  80<H>  ----------------------------<  156<H>  4.1   |  28  |  0.76    Ca    10.5      22 Sep 2018 07:06                                 [All pertinent / recent available Imaging reports and other labs reviewed]     ---___---___---___---___---___---___ ---___---___---___---___---           <<<  A S S E S S M E N T   A N D   P L A N :  >>>          -GI/DVT Prophylaxis.    --------------------------------------------  Case discussed with   Education given on     >>______________________<<      Deniz Bolden .         phone   [phone]

## 2018-09-24 LAB
ANION GAP SERPL CALC-SCNC: 10 MMOL/L — SIGNIFICANT CHANGE UP (ref 5–17)
BASOPHILS # BLD AUTO: 0.04 K/UL — SIGNIFICANT CHANGE UP (ref 0–0.2)
BASOPHILS NFR BLD AUTO: 0.4 % — SIGNIFICANT CHANGE UP (ref 0–2)
BUN SERPL-MCNC: 86 MG/DL — HIGH (ref 7–23)
C DIFF GDH STL QL: NEGATIVE — SIGNIFICANT CHANGE UP
C DIFF GDH STL QL: SIGNIFICANT CHANGE UP
CALCIUM SERPL-MCNC: 10.5 MG/DL — SIGNIFICANT CHANGE UP (ref 8.4–10.5)
CHLORIDE SERPL-SCNC: 109 MMOL/L — HIGH (ref 96–108)
CO2 SERPL-SCNC: 29 MMOL/L — SIGNIFICANT CHANGE UP (ref 22–31)
CREAT SERPL-MCNC: 0.71 MG/DL — SIGNIFICANT CHANGE UP (ref 0.5–1.3)
CULTURE RESULTS: SIGNIFICANT CHANGE UP
CULTURE RESULTS: SIGNIFICANT CHANGE UP
EOSINOPHIL # BLD AUTO: 0.59 K/UL — HIGH (ref 0–0.5)
EOSINOPHIL NFR BLD AUTO: 6 % — SIGNIFICANT CHANGE UP (ref 0–6)
GLUCOSE BLDC GLUCOMTR-MCNC: 100 MG/DL — HIGH (ref 70–99)
GLUCOSE BLDC GLUCOMTR-MCNC: 120 MG/DL — HIGH (ref 70–99)
GLUCOSE BLDC GLUCOMTR-MCNC: 133 MG/DL — HIGH (ref 70–99)
GLUCOSE BLDC GLUCOMTR-MCNC: 147 MG/DL — HIGH (ref 70–99)
GLUCOSE BLDC GLUCOMTR-MCNC: 161 MG/DL — HIGH (ref 70–99)
GLUCOSE BLDC GLUCOMTR-MCNC: 200 MG/DL — HIGH (ref 70–99)
GLUCOSE SERPL-MCNC: 137 MG/DL — HIGH (ref 70–99)
HCT VFR BLD CALC: 27.1 % — LOW (ref 34.5–45)
HGB BLD-MCNC: 8.3 G/DL — LOW (ref 11.5–15.5)
IMM GRANULOCYTES NFR BLD AUTO: 0.2 % — SIGNIFICANT CHANGE UP (ref 0–1.5)
LYMPHOCYTES # BLD AUTO: 1.51 K/UL — SIGNIFICANT CHANGE UP (ref 1–3.3)
LYMPHOCYTES # BLD AUTO: 15.4 % — SIGNIFICANT CHANGE UP (ref 13–44)
MAGNESIUM SERPL-MCNC: 2 MG/DL — SIGNIFICANT CHANGE UP (ref 1.6–2.6)
MCHC RBC-ENTMCNC: 30.6 GM/DL — LOW (ref 32–36)
MCHC RBC-ENTMCNC: 30.9 PG — SIGNIFICANT CHANGE UP (ref 27–34)
MCV RBC AUTO: 100.7 FL — HIGH (ref 80–100)
MONOCYTES # BLD AUTO: 0.51 K/UL — SIGNIFICANT CHANGE UP (ref 0–0.9)
MONOCYTES NFR BLD AUTO: 5.2 % — SIGNIFICANT CHANGE UP (ref 2–14)
NEUTROPHILS # BLD AUTO: 7.12 K/UL — SIGNIFICANT CHANGE UP (ref 1.8–7.4)
NEUTROPHILS NFR BLD AUTO: 72.8 % — SIGNIFICANT CHANGE UP (ref 43–77)
OSMOLALITY UR: 617 MOS/KG — SIGNIFICANT CHANGE UP (ref 50–1200)
PHOSPHATE SERPL-MCNC: 4 MG/DL — SIGNIFICANT CHANGE UP (ref 2.5–4.5)
PLATELET # BLD AUTO: 379 K/UL — SIGNIFICANT CHANGE UP (ref 150–400)
POTASSIUM SERPL-MCNC: 3.9 MMOL/L — SIGNIFICANT CHANGE UP (ref 3.5–5.3)
POTASSIUM SERPL-SCNC: 3.9 MMOL/L — SIGNIFICANT CHANGE UP (ref 3.5–5.3)
RBC # BLD: 2.69 M/UL — LOW (ref 3.8–5.2)
RBC # FLD: 17.8 % — HIGH (ref 10.3–14.5)
SODIUM SERPL-SCNC: 148 MMOL/L — HIGH (ref 135–145)
SPECIMEN SOURCE: SIGNIFICANT CHANGE UP
SPECIMEN SOURCE: SIGNIFICANT CHANGE UP
WBC # BLD: 9.79 K/UL — SIGNIFICANT CHANGE UP (ref 3.8–10.5)
WBC # FLD AUTO: 9.79 K/UL — SIGNIFICANT CHANGE UP (ref 3.8–10.5)

## 2018-09-24 PROCEDURE — 99233 SBSQ HOSP IP/OBS HIGH 50: CPT | Mod: GC

## 2018-09-24 PROCEDURE — 99232 SBSQ HOSP IP/OBS MODERATE 35: CPT

## 2018-09-24 RX ADMIN — MORPHINE SULFATE 1 MILLIGRAM(S): 50 CAPSULE, EXTENDED RELEASE ORAL at 11:01

## 2018-09-24 RX ADMIN — Medication 0.5 MILLIGRAM(S): at 13:37

## 2018-09-24 RX ADMIN — Medication 500000 UNIT(S): at 06:02

## 2018-09-24 RX ADMIN — Medication 500000 UNIT(S): at 13:38

## 2018-09-24 RX ADMIN — HUMAN INSULIN 8 UNIT(S): 100 INJECTION, SUSPENSION SUBCUTANEOUS at 06:15

## 2018-09-24 RX ADMIN — CHLORHEXIDINE GLUCONATE 1 APPLICATION(S): 213 SOLUTION TOPICAL at 06:01

## 2018-09-24 RX ADMIN — Medication 3 MILLILITER(S): at 17:41

## 2018-09-24 RX ADMIN — Medication 500000 UNIT(S): at 00:35

## 2018-09-24 RX ADMIN — ERGOCALCIFEROL 8000 UNIT(S): 1.25 CAPSULE ORAL at 17:41

## 2018-09-24 RX ADMIN — Medication 3 MILLILITER(S): at 13:37

## 2018-09-24 RX ADMIN — LIDOCAINE 1 PATCH: 4 CREAM TOPICAL at 11:03

## 2018-09-24 RX ADMIN — Medication 1 DROP(S): at 06:00

## 2018-09-24 RX ADMIN — Medication 0.5 MILLIGRAM(S): at 23:21

## 2018-09-24 RX ADMIN — Medication 1 DROP(S): at 13:38

## 2018-09-24 RX ADMIN — MUPIROCIN 1 APPLICATION(S): 20 OINTMENT TOPICAL at 17:40

## 2018-09-24 RX ADMIN — MIRTAZAPINE 15 MILLIGRAM(S): 45 TABLET, ORALLY DISINTEGRATING ORAL at 23:21

## 2018-09-24 RX ADMIN — Medication 3 MILLILITER(S): at 00:34

## 2018-09-24 RX ADMIN — Medication 500000 UNIT(S): at 17:41

## 2018-09-24 RX ADMIN — MORPHINE SULFATE 1 MILLIGRAM(S): 50 CAPSULE, EXTENDED RELEASE ORAL at 05:04

## 2018-09-24 RX ADMIN — Medication 1: at 13:01

## 2018-09-24 RX ADMIN — Medication 1 DROP(S): at 23:20

## 2018-09-24 RX ADMIN — MUPIROCIN 1 APPLICATION(S): 20 OINTMENT TOPICAL at 06:02

## 2018-09-24 RX ADMIN — MORPHINE SULFATE 1 MILLIGRAM(S): 50 CAPSULE, EXTENDED RELEASE ORAL at 04:49

## 2018-09-24 RX ADMIN — LIDOCAINE 1 PATCH: 4 CREAM TOPICAL at 23:21

## 2018-09-24 RX ADMIN — MORPHINE SULFATE 1 MILLIGRAM(S): 50 CAPSULE, EXTENDED RELEASE ORAL at 10:58

## 2018-09-24 RX ADMIN — ENOXAPARIN SODIUM 40 MILLIGRAM(S): 100 INJECTION SUBCUTANEOUS at 13:37

## 2018-09-24 RX ADMIN — Medication 1: at 17:40

## 2018-09-24 RX ADMIN — PANTOPRAZOLE SODIUM 40 MILLIGRAM(S): 20 TABLET, DELAYED RELEASE ORAL at 13:38

## 2018-09-24 RX ADMIN — Medication 7.5 MILLIGRAM(S): at 06:00

## 2018-09-24 RX ADMIN — SERTRALINE 50 MILLIGRAM(S): 25 TABLET, FILM COATED ORAL at 13:37

## 2018-09-24 RX ADMIN — Medication 2 MILLIGRAM(S): at 23:20

## 2018-09-24 RX ADMIN — MINOCYCLINE HYDROCHLORIDE 100 MILLIGRAM(S): 45 TABLET, EXTENDED RELEASE ORAL at 17:46

## 2018-09-24 RX ADMIN — Medication 3 MILLILITER(S): at 23:21

## 2018-09-24 RX ADMIN — MINOCYCLINE HYDROCHLORIDE 100 MILLIGRAM(S): 45 TABLET, EXTENDED RELEASE ORAL at 06:00

## 2018-09-24 RX ADMIN — Medication 500000 UNIT(S): at 23:22

## 2018-09-24 RX ADMIN — Medication 3 MILLILITER(S): at 06:01

## 2018-09-24 RX ADMIN — Medication 0.5 MILLIGRAM(S): at 00:34

## 2018-09-24 RX ADMIN — Medication 300 MILLIGRAM(S): at 13:38

## 2018-09-24 NOTE — PROGRESS NOTE ADULT - PROBLEM SELECTOR PLAN 2
BUn continues to trend up ~ 86  Elevated BUN likely in setting of dehydration+ prednisone  Recommend IV fluids NS@ 50 cc/hr.

## 2018-09-24 NOTE — PROGRESS NOTE ADULT - PROBLEM SELECTOR PLAN 1
-test BG Q6h, goal BS is 100-200 based on her age and dementia  -c/w NPH 8 units QAM at 6am w/prednisone dose  -c/w Humalog low correction scale Q6h  -will monitor

## 2018-09-24 NOTE — PROGRESS NOTE ADULT - SUBJECTIVE AND OBJECTIVE BOX
---___---___---___---___---___---___ ---___---___---___---___---___---___---___---___---___---                    <<<  M E D I C A L   A T T E N D I N G    F O L L O W    U P   N O T E  >>>    feeling well otherwise unchanged. fever has stabilized   ---___---___---___---___---___---      <<<  MEDICATIONS:  >>>    MEDICATIONS  (STANDING):  ALBUTerol/ipratropium for Nebulization 3 milliLiter(s) Nebulizer every 6 hours  artificial tears (preservative free) Ophthalmic Solution 1 Drop(s) Both EYES three times a day  buDESOnide   0.5 milliGRAM(s) Respule 0.5 milliGRAM(s) Inhalation every 12 hours  chlorhexidine 4% Liquid 1 Application(s) Topical <User Schedule>  clonazePAM Tablet 2 milliGRAM(s) Oral <User Schedule>  dextrose 5%. 1000 milliLiter(s) (50 mL/Hr) IV Continuous <Continuous>  dextrose 50% Injectable 12.5 Gram(s) IV Push once  dextrose 50% Injectable 25 Gram(s) IV Push once  dextrose 50% Injectable 25 Gram(s) IV Push once  enoxaparin Injectable 40 milliGRAM(s) SubCutaneous daily  ergocalciferol Drops 8000 Unit(s) Oral daily  fentaNYL   Patch  12 MICROgram(s)/Hr 1 Patch Transdermal every 72 hours  ferrous    sulfate Liquid 300 milliGRAM(s) Enteral Tube daily  insulin lispro (HumaLOG) corrective regimen sliding scale   SubCutaneous every 6 hours  insulin NPH human recombinant 8 Unit(s) SubCutaneous <User Schedule>  lidocaine   Patch 1 Patch Transdermal every 24 hours  minocycline 100 milliGRAM(s) Oral two times a day  mirtazapine 15 milliGRAM(s) Oral <User Schedule>  mupirocin 2% Ointment 1 Application(s) Topical two times a day  nystatin    Suspension 355263 Unit(s) Oral four times a day  pantoprazole  Injectable 40 milliGRAM(s) IV Push every 24 hours  predniSONE   Tablet 7.5 milliGRAM(s) Oral <User Schedule>  sertraline 50 milliGRAM(s) Oral daily      MEDICATIONS  (PRN):  acetaminophen    Suspension .. 650 milliGRAM(s) Oral every 8 hours PRN Mild Pain (1 - 3)  acetaminophen  IVPB .. 1000 milliGRAM(s) IV Intermittent every 8 hours PRN Mild Pain (1 - 3), Moderate Pain (4 - 6), Severe Pain (7 - 10)  dextrose 40% Gel 15 Gram(s) Oral once PRN Blood Glucose LESS THAN 70 milliGRAM(s)/deciLiter  glucagon  Injectable 1 milliGRAM(s) IntraMuscular once PRN Glucose <70 milliGRAM(s)/deciLiter  morphine  - Injectable 1 milliGRAM(s) IV Push every 4 hours PRN breakthrough pain  nystatin Powder 1 Application(s) Topical two times a day PRN rash/itchiness       ---___---___---___---___---___---     <<<REVIEW OF SYSTEM: >>>    GEN: no fever, no chills, no pain  RESP: no SOB, no cough, no sputum  CVS: no chest pain, no palpitations, no edema  GI: no abdominal pain, no nausea, no vomiting, no constipation, no diarrhea  : no dysurea, no frequency  NEURO: no headache, no dizziness  PSYCH: no depression, not anxious  Derm : no itching, no rash     ---___---___---___---___---___---          <<<  VITAL SIGNS: >>>    T(F): 99.3 (09-24-18 @ 13:11), Max: 99.3 (09-24-18 @ 13:11)  HR: 98 (09-24-18 @ 13:11) (82 - 102)  BP: 119/70 (09-24-18 @ 13:11) (118/73 - 134/78)  RR: 18 (09-24-18 @ 13:11) (18 - 20)  SpO2: 92% (09-24-18 @ 13:11) (92% - 98%)  Wt(kg): --  CAPILLARY BLOOD GLUCOSE      POCT Blood Glucose.: 200 mg/dL (24 Sep 2018 12:59)    I&O's Summary    23 Sep 2018 07:01  -  24 Sep 2018 07:00  --------------------------------------------------------  IN: 1490 mL / OUT: 880 mL / NET: 610 mL    24 Sep 2018 07:01  -  24 Sep 2018 14:11  --------------------------------------------------------  IN: 0 mL / OUT: 300 mL / NET: -300 mL         ---___---___---___---___---___---                       PHYSICAL EXAM:    GEN: A&O X 1 , NAD , comfortable  HEENT: NCAT, PERRL, MMM, no scleral icterus, hearing intact  NECK: Supple, No JVD  CVS: S1S2 , regular , No M/R/G appreciated  PULM: CTA B/L,  no W/R/R appreciated  ABD.: soft. non tender, non distended,  bowel sounds present peg noted   Extrem: intact pulses , no edema noted wound vac right leg   Derm: No rash or ecchymosis noted  PSYCH: normal mood, no depression, not anxious     ---___---___---___---___---___---     <<<  LAB AND IMAGING: >>>                          8.3    9.79  )-----------( 379      ( 24 Sep 2018 09:21 )             27.1               09-24    148<H>  |  109<H>  |  86<H>  ----------------------------<  137<H>  3.9   |  29  |  0.71    Ca    10.5      24 Sep 2018 07:30  Phos  4.0     09-24  Mg     2.0     09-24                                 [All pertinent / recent available Imaging reports and other labs reviewed]     ---___---___---___---___---___---___ ---___---___---___---___---           <<<  A S S E S S M E N T   A N D   P L A N :  >>>          -GI/DVT Prophylaxis.    --------------------------------------------  Case discussed with   Education given on     >>______________________<<      Deniz Bolden .         phone   [phone]

## 2018-09-24 NOTE — PROGRESS NOTE ADULT - SUBJECTIVE AND OBJECTIVE BOX
Chief Complaint/Follow-up on: T2DM/OP    Subjective: Pt being cleaned up by the RN and PCA as she had a bm. The pt winched in pain upon being moved but could not verbalize to staff or her  what was hurting her.     MEDICATIONS  (STANDING):  ALBUTerol/ipratropium for Nebulization 3 milliLiter(s) Nebulizer every 6 hours  artificial tears (preservative free) Ophthalmic Solution 1 Drop(s) Both EYES three times a day  buDESOnide   0.5 milliGRAM(s) Respule 0.5 milliGRAM(s) Inhalation every 12 hours  chlorhexidine 4% Liquid 1 Application(s) Topical <User Schedule>  clonazePAM Tablet 2 milliGRAM(s) Oral <User Schedule>  dextrose 5%. 1000 milliLiter(s) (50 mL/Hr) IV Continuous <Continuous>  dextrose 50% Injectable 12.5 Gram(s) IV Push once  dextrose 50% Injectable 25 Gram(s) IV Push once  dextrose 50% Injectable 25 Gram(s) IV Push once  enoxaparin Injectable 40 milliGRAM(s) SubCutaneous daily  ergocalciferol Drops 8000 Unit(s) Oral daily  fentaNYL   Patch  12 MICROgram(s)/Hr 1 Patch Transdermal every 72 hours  ferrous    sulfate Liquid 300 milliGRAM(s) Enteral Tube daily  insulin lispro (HumaLOG) corrective regimen sliding scale   SubCutaneous every 6 hours  insulin NPH human recombinant 8 Unit(s) SubCutaneous <User Schedule>  lidocaine   Patch 1 Patch Transdermal every 24 hours  minocycline 100 milliGRAM(s) Oral two times a day  mirtazapine 15 milliGRAM(s) Oral <User Schedule>  mupirocin 2% Ointment 1 Application(s) Topical two times a day  nystatin    Suspension 265499 Unit(s) Oral four times a day  pantoprazole  Injectable 40 milliGRAM(s) IV Push every 24 hours  predniSONE   Tablet 7.5 milliGRAM(s) Oral <User Schedule>  sertraline 50 milliGRAM(s) Oral daily    MEDICATIONS  (PRN):  acetaminophen    Suspension .. 650 milliGRAM(s) Oral every 8 hours PRN Mild Pain (1 - 3)  acetaminophen  IVPB .. 1000 milliGRAM(s) IV Intermittent every 8 hours PRN Mild Pain (1 - 3), Moderate Pain (4 - 6), Severe Pain (7 - 10)  dextrose 40% Gel 15 Gram(s) Oral once PRN Blood Glucose LESS THAN 70 milliGRAM(s)/deciLiter  glucagon  Injectable 1 milliGRAM(s) IntraMuscular once PRN Glucose <70 milliGRAM(s)/deciLiter  morphine  - Injectable 1 milliGRAM(s) IV Push every 4 hours PRN breakthrough pain  nystatin Powder 1 Application(s) Topical two times a day PRN rash/itchiness      PHYSICAL EXAM:  VITALS: T(C): 37.4 (09-24-18 @ 13:11)  T(F): 99.3 (09-24-18 @ 13:11), Max: 99.3 (09-24-18 @ 13:11)  HR: 98 (09-24-18 @ 13:11) (82 - 102)  BP: 119/70 (09-24-18 @ 13:11) (118/73 - 134/78)  RR:  (18 - 20)  SpO2:  (92% - 98%)  Wt(kg): --  GENERAL: NAD, well-groomed, well-developed  HEENT:  Atraumatic, Normocephalic, moist mucous membranes  RESPIRATORY: Clear to auscultation bilaterally; No rales, rhonchi, wheezing, or rubs  CARDIOVASCULAR: Regular rate and rhythm; No murmurs; no peripheral edema  GI: Soft, nontender, non distended, normal bowel sounds, PEG in mid- upper epigastrium      POCT Blood Glucose.: 200 mg/dL (09-24-18 @ 12:59)  POCT Blood Glucose.: 133 mg/dL (09-24-18 @ 05:49)  POCT Blood Glucose.: 147 mg/dL (09-24-18 @ 00:21)  POCT Blood Glucose.: 159 mg/dL (09-23-18 @ 19:03)  POCT Blood Glucose.: 131 mg/dL (09-23-18 @ 16:08)  POCT Blood Glucose.: 207 mg/dL (09-23-18 @ 12:52)  POCT Blood Glucose.: 162 mg/dL (09-23-18 @ 06:20)  POCT Blood Glucose.: 142 mg/dL (09-23-18 @ 00:14)  POCT Blood Glucose.: 142 mg/dL (09-22-18 @ 17:43)  POCT Blood Glucose.: 197 mg/dL (09-22-18 @ 12:33)  POCT Blood Glucose.: 215 mg/dL (09-22-18 @ 08:37)  POCT Blood Glucose.: 145 mg/dL (09-22-18 @ 05:55)  POCT Blood Glucose.: 148 mg/dL (09-22-18 @ 00:27)  POCT Blood Glucose.: 159 mg/dL (09-21-18 @ 21:28)  POCT Blood Glucose.: 123 mg/dL (09-21-18 @ 17:09)    09-24    148<H>  |  109<H>  |  86<H>  ----------------------------<  137<H>  3.9   |  29  |  0.71    EGFR if : 101  EGFR if non : 88    Ca    10.5      09-24  Mg     2.0     09-24  Phos  4.0     09-24        Hemoglobin A1C, Whole Blood: 5.5 % [4.0 - 5.6] (07-27-18 @ 09:02)

## 2018-09-24 NOTE — CHART NOTE - NSCHARTNOTEFT_GEN_A_CORE
Consult received for tube feeding. Patient noted with loose, stools.     Chart reviewed events noted.     68 year old female w/hyperglycemia due to chronic steroid use, on continuous tube feeds, admitted  w/weakness and MRSA bacteremia w/wound vac. Followed by endocrinologist, BG goal (100-200mg/dl).   Followed by nephrologist, Hypernatremia and prerenal azotemia noted, in the setting of volume depletion,  free water started via  q6hr, concern for rising BUN, Na.            Enteral /Parenteral Nutrition: currently Pivot @35ml/hr x24 hrs plus wendi 2x daily      Current Dosing Weight: t (kg): 33.8 (09-19 @ 12:10)   current actual Weight 33.5kg    Pertinent Medications: MEDICATIONS  (STANDING):  ALBUTerol/ipratropium for Nebulization 3 milliLiter(s) Nebulizer every 6 hours  artificial tears (preservative free) Ophthalmic Solution 1 Drop(s) Both EYES three times a day  buDESOnide   0.5 milliGRAM(s) Respule 0.5 milliGRAM(s) Inhalation every 12 hours  chlorhexidine 4% Liquid 1 Application(s) Topical <User Schedule>  clonazePAM Tablet 2 milliGRAM(s) Oral <User Schedule>  dextrose 5%. 1000 milliLiter(s) (50 mL/Hr) IV Continuous <Continuous>  dextrose 50% Injectable 12.5 Gram(s) IV Push once  dextrose 50% Injectable 25 Gram(s) IV Push once  dextrose 50% Injectable 25 Gram(s) IV Push once  enoxaparin Injectable 40 milliGRAM(s) SubCutaneous daily  ergocalciferol Drops 8000 Unit(s) Oral daily  fentaNYL   Patch  12 MICROgram(s)/Hr 1 Patch Transdermal every 72 hours  ferrous    sulfate Liquid 300 milliGRAM(s) Enteral Tube daily  insulin lispro (HumaLOG) corrective regimen sliding scale   SubCutaneous every 6 hours  insulin NPH human recombinant 8 Unit(s) SubCutaneous <User Schedule>  lidocaine   Patch 1 Patch Transdermal every 24 hours  minocycline 100 milliGRAM(s) Oral two times a day  mirtazapine 15 milliGRAM(s) Oral <User Schedule>  mupirocin 2% Ointment 1 Application(s) Topical two times a day  nystatin    Suspension 668227 Unit(s) Oral four times a day  pantoprazole  Injectable 40 milliGRAM(s) IV Push every 24 hours  predniSONE   Tablet 7.5 milliGRAM(s) Oral <User Schedule>  sertraline 50 milliGRAM(s) Oral daily    MEDICATIONS  (PRN):  acetaminophen    Suspension .. 650 milliGRAM(s) Oral every 8 hours PRN Mild Pain (1 - 3)  acetaminophen  IVPB .. 1000 milliGRAM(s) IV Intermittent every 8 hours PRN Mild Pain (1 - 3), Moderate Pain (4 - 6), Severe Pain (7 - 10)  dextrose 40% Gel 15 Gram(s) Oral once PRN Blood Glucose LESS THAN 70 milliGRAM(s)/deciLiter  glucagon  Injectable 1 milliGRAM(s) IntraMuscular once PRN Glucose <70 milliGRAM(s)/deciLiter  morphine  - Injectable 1 milliGRAM(s) IV Push every 4 hours PRN breakthrough pain  nystatin Powder 1 Application(s) Topical two times a day PRN rash/itchiness    Pertinent Labs:  09-24 Na148 mmol/L<H> Glu 137 mg/dL<H> K+ 3.9 mmol/L Cr  0.71 mg/dL BUN 86 mg/dL<H> 09-24 Phos 4.0 mg/dL      Skin:   maryjane heel DTI, sacral st II  Estimated Needs:     [ X] recalculated: reduced protein warranted, carbohydrate controlled formula       Previous Nutrition Diagnosis:    [X ] Malnutrition          Nutrition Diagnosis is [X ] ongoing  [ ] resolved [ ] not applicable          New Nutrition Diagnosis: [X ] not applicable        Recommendations      [X ] Nutrition Support  Change to Glucerna 1.2 @ 35ml/hr x24 hrs to provide 840ml, 1008 calories, 30/kg, protein 50gm, 1.47gm/kg based on dosing weight 33.8kg.  fluid 820ml  [X ] Other: add free water per Renal/ hydration       Monitoring and Evaluation:      [X ] Tolerance to tube feeding [X ] weights [ Xx] follow up per protocol    [ ] other:

## 2018-09-24 NOTE — PROGRESS NOTE ADULT - ASSESSMENT
67 yo female with dementia, history of UC, and s/p R Hip hemiarthroplasty 6/22/18, post op hematoma  Admitted after left hip fx, noted severe sepsis, MRSA bacteremia, infected R hip- s/p GABRIEL and spacer  Stormy post op course with subsequent episodes of suspected sepsis- unclear source, course of Cefepime  S/p PEG    9/10 CT of thigh without collection  GAYATHRI negative  CXR reviewed, no obvious signs of pneumonia, trace effusion only  Latest Bld Cxs 9/19 and 9/21 are negative  No obvious explanation for recent fevers which have moderated  Silent aspiration possible  WBC normal    Plan:  Completed 42 days of IV MRSA therapy 9/15, continue MCN via PEG  Wound care per PRS  Follow temps and CBC/diff   Repeat blood cultures if fever accelerates   Follow conservatively off additional empiric Tx  ID issues reviewed with  at bedside

## 2018-09-24 NOTE — PROGRESS NOTE ADULT - SUBJECTIVE AND OBJECTIVE BOX
CC: f/u for MRSA bacteremia and rt hip infection    Patient reports: she is non verbal, tolerating enteral feeds, respirations are not labored    REVIEW OF SYSTEMS:  All other review of systems negative (Comprehensive ROS): limited by dementia    Antimicrobials Day #  :  minocycline 100 milliGRAM(s) Oral two times a day  nystatin    Suspension 013505 Unit(s) Oral four times a day    Other Medications Reviewed    T(F): 97.3 (09-24-18 @ 04:23), Max: 98.4 (09-23-18 @ 20:13)  HR: 102 (09-24-18 @ 04:23)  BP: 118/73 (09-24-18 @ 04:23)  RR: 18 (09-24-18 @ 04:23)  SpO2: 94% (09-24-18 @ 04:23)  Wt(kg): --    PHYSICAL EXAM:  General: alert, no acute distress  Eyes:  anicteric, no conjunctival injection, no discharge  Oropharynx: no lesions or injection 	  Neck: supple, without adenopathy  Lungs: clear to auscultation  Heart: regular rate and rhythm; no murmur, rubs or gallops  Abdomen: soft, nondistended, nontender, without mass or organomegaly  Skin: wound vac on Rt hip  Extremities: no clubbing, cyanosis, or edema  Neurologic: sleepy, marginally interactive    LAB RESULTS:                        8.3    9.79  )-----------( 379      ( 24 Sep 2018 09:21 )             27.1     09-24    148<H>  |  109<H>  |  86<H>  ----------------------------<  137<H>  3.9   |  29  |  0.71    Ca    10.5      24 Sep 2018 07:30  Phos  4.0     09-24  Mg     2.0     09-24          MICROBIOLOGY:  RECENT CULTURES:  09-22 @ 01:09 .Blood Blood     No growth to date.          RADIOLOGY REVIEWED:    < from: Xray Chest 1 View- PORTABLE-Urgent (09.21.18 @ 12:12) >  IMPRESSION:    Trace left effusion    < end of copied text >

## 2018-09-24 NOTE — PROGRESS NOTE ADULT - SUBJECTIVE AND OBJECTIVE BOX
Events noted. Had elevated tempt over the weekend. Daughter reports no agitation today and appears more withdrawn. Daughter says that pt's  here overnight and the pt. slept well.       Vital Signs Last 24 Hrs  T(C): 37.4 (24 Sep 2018 13:11), Max: 37.4 (24 Sep 2018 13:11)  T(F): 99.3 (24 Sep 2018 13:11), Max: 99.3 (24 Sep 2018 13:11)  HR: 98 (24 Sep 2018 13:11) (82 - 102)  BP: 119/70 (24 Sep 2018 13:11) (118/73 - 123/72)  BP(mean): --  RR: 18 (24 Sep 2018 13:11) (18 - 20)  SpO2: 92% (24 Sep 2018 13:11) (92% - 98%)                          8.3    9.79  )-----------( 379      ( 24 Sep 2018 09:21 )             27.1       09-24    148<H>  |  109<H>  |  86<H>  ----------------------------<  137<H>  3.9   |  29  |  0.71    Ca    10.5      24 Sep 2018 07:30  Phos  4.0     09-24  Mg     2.0     09-24                MEDICATIONS  (STANDING):  ALBUTerol/ipratropium for Nebulization 3 milliLiter(s) Nebulizer every 6 hours  artificial tears (preservative free) Ophthalmic Solution 1 Drop(s) Both EYES three times a day  buDESOnide   0.5 milliGRAM(s) Respule 0.5 milliGRAM(s) Inhalation every 12 hours  chlorhexidine 4% Liquid 1 Application(s) Topical <User Schedule>  clonazePAM Tablet 2 milliGRAM(s) Oral <User Schedule>  dextrose 5%. 1000 milliLiter(s) (50 mL/Hr) IV Continuous <Continuous>  dextrose 50% Injectable 12.5 Gram(s) IV Push once  dextrose 50% Injectable 25 Gram(s) IV Push once  dextrose 50% Injectable 25 Gram(s) IV Push once  enoxaparin Injectable 40 milliGRAM(s) SubCutaneous daily  ergocalciferol Drops 8000 Unit(s) Oral daily  fentaNYL   Patch  12 MICROgram(s)/Hr 1 Patch Transdermal every 72 hours  ferrous    sulfate Liquid 300 milliGRAM(s) Enteral Tube daily  insulin lispro (HumaLOG) corrective regimen sliding scale   SubCutaneous every 6 hours  insulin NPH human recombinant 8 Unit(s) SubCutaneous <User Schedule>  lidocaine   Patch 1 Patch Transdermal every 24 hours  minocycline 100 milliGRAM(s) Oral two times a day  mirtazapine 15 milliGRAM(s) Oral <User Schedule>  mupirocin 2% Ointment 1 Application(s) Topical two times a day  nystatin    Suspension 168006 Unit(s) Oral four times a day  pantoprazole  Injectable 40 milliGRAM(s) IV Push every 24 hours  predniSONE   Tablet 7.5 milliGRAM(s) Oral <User Schedule>  sertraline 50 milliGRAM(s) Oral daily    MEDICATIONS  (PRN):  acetaminophen    Suspension .. 650 milliGRAM(s) Oral every 8 hours PRN Mild Pain (1 - 3)  acetaminophen  IVPB .. 1000 milliGRAM(s) IV Intermittent every 8 hours PRN Mild Pain (1 - 3), Moderate Pain (4 - 6), Severe Pain (7 - 10)  dextrose 40% Gel 15 Gram(s) Oral once PRN Blood Glucose LESS THAN 70 milliGRAM(s)/deciLiter  glucagon  Injectable 1 milliGRAM(s) IntraMuscular once PRN Glucose <70 milliGRAM(s)/deciLiter  morphine  - Injectable 1 milliGRAM(s) IV Push every 4 hours PRN breakthrough pain  nystatin Powder 1 Application(s) Topical two times a day PRN rash/itchiness      Elderly WF in bed, cachectic, calm, cooperative, alert and oriented x 0 .  No psychomotor abnormalities. Insight and judgment are poor. She is mute. Poor eye contact.  No hallucinations nor delusions. Her affect is constricted. Attention and concentration, short term memory, and long term memory cannot be assessed now given her mutism.

## 2018-09-24 NOTE — PROGRESS NOTE ADULT - ATTENDING COMMENTS
Hypernatremia and prerenal azotemia  -in the setting of volume depletion  -please start free water via  q6hr  -may need IVF  -has leslye    Increased BUN: prerenal+steroids+feeds which are high in protein--change feeds to lower protein feeds- nutrition follow up Hypernatremia and prerenal azotemia  -in the setting of volume depletion  -started free water via  q6hr  -may need IVF depending on BUN trend after feeds are changed  -sonia gavin    Increased BUN: prerenal+steroids+feeds which are high in protein--change feeds to lower protein feeds- nutrition follow up

## 2018-09-24 NOTE — PROGRESS NOTE ADULT - PROBLEM SELECTOR PLAN 2
-Vit D 25OH was 18.9, would prefer to get her to high 20s before treating  -continue vit D 8,100 IU per PEG    Marilyn Schultz MD  [phone]

## 2018-09-24 NOTE — PROGRESS NOTE ADULT - ASSESSMENT
Dementia. Rule out delirium.     Recommend  Continue with Sertraline 50mg/day, Mirtazepine 15mg qhs. If pt. becomes sedated/more withdrawn, reduce the Klonopin to 1.5mg qhs (as it can interact with Fentanyl causing these sx.).    Irvin Reyes M.D.  Psychiatry  [phone]

## 2018-09-24 NOTE — PROGRESS NOTE ADULT - SUBJECTIVE AND OBJECTIVE BOX
[facility] DIVISION OF KIDNEY DISEASES AND HYPERTENSION -- FOLLOW UP NOTE  --------------------------------------------------------------------------------  Chief Complaint: hypernatremia    24 hour events/subjective:  No acute events.      PAST HISTORY  --------------------------------------------------------------------------------  No significant changes to PMH, PSH, FHx, SHx, unless otherwise noted    ALLERGIES & MEDICATIONS  --------------------------------------------------------------------------------  Allergies    ASA; dye contrast (Anaphylaxis)  aspirin (Short breath)  divalproex sodium (Other (Unknown))  Haldol (Other (Unknown))  penicillin (Short breath; Rash)  sulfa drugs (Short breath; Rash)  Xanax (Other (Unknown))    Intolerances      Standing Inpatient Medications  ALBUTerol/ipratropium for Nebulization 3 milliLiter(s) Nebulizer every 6 hours  artificial tears (preservative free) Ophthalmic Solution 1 Drop(s) Both EYES three times a day  buDESOnide   0.5 milliGRAM(s) Respule 0.5 milliGRAM(s) Inhalation every 12 hours  chlorhexidine 4% Liquid 1 Application(s) Topical <User Schedule>  clonazePAM Tablet 2 milliGRAM(s) Oral <User Schedule>  dextrose 5%. 1000 milliLiter(s) IV Continuous <Continuous>  dextrose 50% Injectable 12.5 Gram(s) IV Push once  dextrose 50% Injectable 25 Gram(s) IV Push once  dextrose 50% Injectable 25 Gram(s) IV Push once  enoxaparin Injectable 40 milliGRAM(s) SubCutaneous daily  ergocalciferol Drops 8000 Unit(s) Oral daily  fentaNYL   Patch  12 MICROgram(s)/Hr 1 Patch Transdermal every 72 hours  ferrous    sulfate Liquid 300 milliGRAM(s) Enteral Tube daily  insulin lispro (HumaLOG) corrective regimen sliding scale   SubCutaneous every 6 hours  insulin NPH human recombinant 8 Unit(s) SubCutaneous <User Schedule>  lidocaine   Patch 1 Patch Transdermal every 24 hours  minocycline 100 milliGRAM(s) Oral two times a day  mirtazapine 15 milliGRAM(s) Oral <User Schedule>  mupirocin 2% Ointment 1 Application(s) Topical two times a day  nystatin    Suspension 575936 Unit(s) Oral four times a day  pantoprazole  Injectable 40 milliGRAM(s) IV Push every 24 hours  predniSONE   Tablet 7.5 milliGRAM(s) Oral <User Schedule>  sertraline 50 milliGRAM(s) Oral daily    PRN Inpatient Medications  acetaminophen    Suspension .. 650 milliGRAM(s) Oral every 8 hours PRN  acetaminophen  IVPB .. 1000 milliGRAM(s) IV Intermittent every 8 hours PRN  dextrose 40% Gel 15 Gram(s) Oral once PRN  glucagon  Injectable 1 milliGRAM(s) IntraMuscular once PRN  morphine  - Injectable 1 milliGRAM(s) IV Push every 4 hours PRN  nystatin Powder 1 Application(s) Topical two times a day PRN      REVIEW OF SYSTEMS  --------------------------------------------------------------------------------  unable to obtain      VITALS/PHYSICAL EXAM  --------------------------------------------------------------------------------  T(C): 36.3 (09-24-18 @ 04:23), Max: 36.9 (09-23-18 @ 20:13)  HR: 102 (09-24-18 @ 04:23) (82 - 102)  BP: 118/73 (09-24-18 @ 04:23) (118/73 - 134/78)  RR: 18 (09-24-18 @ 04:23) (18 - 20)  SpO2: 94% (09-24-18 @ 04:23) (94% - 98%)  Wt(kg): --        09-23-18 @ 07:01  -  09-24-18 @ 07:00  --------------------------------------------------------  IN: 1490 mL / OUT: 880 mL / NET: 610 mL      Physical Exam:  	Gen: awake  	Pulm: CTA B/L  	CV: RRR, S1S2; no rub  	Back:   no sacral edema  	Abd: +BS, soft,    	: No suprapubic tenderness; +gavin  	Ext: no edema  	Neuro: alert  	Skin: Warm     LABS/STUDIES  --------------------------------------------------------------------------------              8.3    9.79  >-----------<  379      [09-24-18 @ 09:21]              27.1     148  |  109  |  86  ----------------------------<  137      [09-24-18 @ 07:30]  3.9   |  29  |  0.71        Ca     10.5     [09-24-18 @ 07:30]      Mg     2.0     [09-24-18 @ 07:30]      Phos  4.0     [09-24-18 @ 07:30]            Creatinine Trend:  SCr 0.71 [09-24 @ 07:30]  SCr 0.80 [09-23 @ 07:29]  SCr 0.76 [09-22 @ 07:06]  SCr 0.81 [09-21 @ 06:58]  SCr 0.79 [09-19 @ 07:06]    Urinalysis - [09-19-18 @ 08:11]      Color Yellow / Appearance Slightly Turbid / SG 1.018 / pH 6.0      Gluc Negative / Ketone Negative  / Bili Negative / Urobili Negative       Blood Moderate / Protein 100 / Leuk Est Small / Nitrite Negative      RBC 73 / WBC 26 / Hyaline 3 / Gran 5 / Sq Epi  / Non Sq Epi 10 / Bacteria Few    Urine Sodium 99      [09-23-18 @ 20:30]  Urine Osmolality 617      [09-23-18 @ 22:48]    Iron 14, TIBC 85, %sat 16      [08-14-18 @ 05:07]  PTH -- (Ca 8.6)      [09-11-18 @ 09:14]   52  Vitamin D (25OH) 18.9      [09-11-18 @ 09:04]  HbA1c 5.5      [07-27-18 @ 09:02]  TSH 0.95      [07-27-18 @ 09:02]

## 2018-09-25 DIAGNOSIS — R13.10 DYSPHAGIA, UNSPECIFIED: ICD-10-CM

## 2018-09-25 LAB
ANION GAP SERPL CALC-SCNC: 11 MMOL/L — SIGNIFICANT CHANGE UP (ref 5–17)
BASOPHILS # BLD AUTO: 0.05 K/UL — SIGNIFICANT CHANGE UP (ref 0–0.2)
BASOPHILS NFR BLD AUTO: 0.5 % — SIGNIFICANT CHANGE UP (ref 0–2)
BUN SERPL-MCNC: 71 MG/DL — HIGH (ref 7–23)
CALCIUM SERPL-MCNC: 10.2 MG/DL — SIGNIFICANT CHANGE UP (ref 8.4–10.5)
CHLORIDE SERPL-SCNC: 108 MMOL/L — SIGNIFICANT CHANGE UP (ref 96–108)
CO2 SERPL-SCNC: 29 MMOL/L — SIGNIFICANT CHANGE UP (ref 22–31)
CREAT SERPL-MCNC: 0.72 MG/DL — SIGNIFICANT CHANGE UP (ref 0.5–1.3)
EOSINOPHIL # BLD AUTO: 0.66 K/UL — HIGH (ref 0–0.5)
EOSINOPHIL NFR BLD AUTO: 6 % — SIGNIFICANT CHANGE UP (ref 0–6)
GLUCOSE BLDC GLUCOMTR-MCNC: 116 MG/DL — HIGH (ref 70–99)
GLUCOSE BLDC GLUCOMTR-MCNC: 121 MG/DL — HIGH (ref 70–99)
GLUCOSE BLDC GLUCOMTR-MCNC: 124 MG/DL — HIGH (ref 70–99)
GLUCOSE BLDC GLUCOMTR-MCNC: 154 MG/DL — HIGH (ref 70–99)
GLUCOSE BLDC GLUCOMTR-MCNC: 188 MG/DL — HIGH (ref 70–99)
GLUCOSE SERPL-MCNC: 138 MG/DL — HIGH (ref 70–99)
GRAM STN FLD: SIGNIFICANT CHANGE UP
HCT VFR BLD CALC: 28.8 % — LOW (ref 34.5–45)
HGB BLD-MCNC: 8.8 G/DL — LOW (ref 11.5–15.5)
IMM GRANULOCYTES NFR BLD AUTO: 0.2 % — SIGNIFICANT CHANGE UP (ref 0–1.5)
LYMPHOCYTES # BLD AUTO: 1.67 K/UL — SIGNIFICANT CHANGE UP (ref 1–3.3)
LYMPHOCYTES # BLD AUTO: 15.3 % — SIGNIFICANT CHANGE UP (ref 13–44)
MCHC RBC-ENTMCNC: 30.4 PG — SIGNIFICANT CHANGE UP (ref 27–34)
MCHC RBC-ENTMCNC: 30.6 GM/DL — LOW (ref 32–36)
MCV RBC AUTO: 99.7 FL — SIGNIFICANT CHANGE UP (ref 80–100)
MONOCYTES # BLD AUTO: 0.75 K/UL — SIGNIFICANT CHANGE UP (ref 0–0.9)
MONOCYTES NFR BLD AUTO: 6.9 % — SIGNIFICANT CHANGE UP (ref 2–14)
NEUTROPHILS # BLD AUTO: 7.76 K/UL — HIGH (ref 1.8–7.4)
NEUTROPHILS NFR BLD AUTO: 71.1 % — SIGNIFICANT CHANGE UP (ref 43–77)
PLATELET # BLD AUTO: 435 K/UL — HIGH (ref 150–400)
POTASSIUM SERPL-MCNC: 4.3 MMOL/L — SIGNIFICANT CHANGE UP (ref 3.5–5.3)
POTASSIUM SERPL-SCNC: 4.3 MMOL/L — SIGNIFICANT CHANGE UP (ref 3.5–5.3)
RBC # BLD: 2.89 M/UL — LOW (ref 3.8–5.2)
RBC # FLD: 17.4 % — HIGH (ref 10.3–14.5)
SODIUM SERPL-SCNC: 148 MMOL/L — HIGH (ref 135–145)
WBC # BLD: 10.91 K/UL — HIGH (ref 3.8–10.5)
WBC # FLD AUTO: 10.91 K/UL — HIGH (ref 3.8–10.5)

## 2018-09-25 PROCEDURE — 99232 SBSQ HOSP IP/OBS MODERATE 35: CPT

## 2018-09-25 RX ORDER — CLONAZEPAM 1 MG
2 TABLET ORAL
Qty: 0 | Refills: 0 | Status: DISCONTINUED | OUTPATIENT
Start: 2018-09-25 | End: 2018-10-01

## 2018-09-25 RX ADMIN — MIRTAZAPINE 15 MILLIGRAM(S): 45 TABLET, ORALLY DISINTEGRATING ORAL at 22:50

## 2018-09-25 RX ADMIN — MORPHINE SULFATE 1 MILLIGRAM(S): 50 CAPSULE, EXTENDED RELEASE ORAL at 10:25

## 2018-09-25 RX ADMIN — Medication 0.5 MILLIGRAM(S): at 13:23

## 2018-09-25 RX ADMIN — FENTANYL CITRATE 1 PATCH: 50 INJECTION INTRAVENOUS at 10:55

## 2018-09-25 RX ADMIN — MORPHINE SULFATE 1 MILLIGRAM(S): 50 CAPSULE, EXTENDED RELEASE ORAL at 00:29

## 2018-09-25 RX ADMIN — SERTRALINE 50 MILLIGRAM(S): 25 TABLET, FILM COATED ORAL at 13:24

## 2018-09-25 RX ADMIN — MORPHINE SULFATE 1 MILLIGRAM(S): 50 CAPSULE, EXTENDED RELEASE ORAL at 06:35

## 2018-09-25 RX ADMIN — Medication 3 MILLILITER(S): at 06:09

## 2018-09-25 RX ADMIN — MORPHINE SULFATE 1 MILLIGRAM(S): 50 CAPSULE, EXTENDED RELEASE ORAL at 11:35

## 2018-09-25 RX ADMIN — CHLORHEXIDINE GLUCONATE 1 APPLICATION(S): 213 SOLUTION TOPICAL at 06:09

## 2018-09-25 RX ADMIN — LIDOCAINE 1 PATCH: 4 CREAM TOPICAL at 13:40

## 2018-09-25 RX ADMIN — MINOCYCLINE HYDROCHLORIDE 100 MILLIGRAM(S): 45 TABLET, EXTENDED RELEASE ORAL at 18:38

## 2018-09-25 RX ADMIN — Medication 500000 UNIT(S): at 06:09

## 2018-09-25 RX ADMIN — Medication 300 MILLIGRAM(S): at 13:24

## 2018-09-25 RX ADMIN — MORPHINE SULFATE 1 MILLIGRAM(S): 50 CAPSULE, EXTENDED RELEASE ORAL at 00:45

## 2018-09-25 RX ADMIN — Medication 1: at 06:24

## 2018-09-25 RX ADMIN — Medication 1 DROP(S): at 06:10

## 2018-09-25 RX ADMIN — MUPIROCIN 1 APPLICATION(S): 20 OINTMENT TOPICAL at 22:50

## 2018-09-25 RX ADMIN — MUPIROCIN 1 APPLICATION(S): 20 OINTMENT TOPICAL at 06:09

## 2018-09-25 RX ADMIN — ERGOCALCIFEROL 8000 UNIT(S): 1.25 CAPSULE ORAL at 18:38

## 2018-09-25 RX ADMIN — MINOCYCLINE HYDROCHLORIDE 100 MILLIGRAM(S): 45 TABLET, EXTENDED RELEASE ORAL at 06:09

## 2018-09-25 RX ADMIN — MORPHINE SULFATE 1 MILLIGRAM(S): 50 CAPSULE, EXTENDED RELEASE ORAL at 06:17

## 2018-09-25 RX ADMIN — Medication 7.5 MILLIGRAM(S): at 06:09

## 2018-09-25 RX ADMIN — HUMAN INSULIN 8 UNIT(S): 100 INJECTION, SUSPENSION SUBCUTANEOUS at 06:24

## 2018-09-25 RX ADMIN — PANTOPRAZOLE SODIUM 40 MILLIGRAM(S): 20 TABLET, DELAYED RELEASE ORAL at 13:38

## 2018-09-25 RX ADMIN — Medication 2 MILLIGRAM(S): at 22:50

## 2018-09-25 RX ADMIN — Medication 500000 UNIT(S): at 13:24

## 2018-09-25 RX ADMIN — Medication 500000 UNIT(S): at 21:45

## 2018-09-25 RX ADMIN — ENOXAPARIN SODIUM 40 MILLIGRAM(S): 100 INJECTION SUBCUTANEOUS at 13:23

## 2018-09-25 RX ADMIN — LIDOCAINE 1 PATCH: 4 CREAM TOPICAL at 21:49

## 2018-09-25 RX ADMIN — Medication 1 DROP(S): at 21:45

## 2018-09-25 RX ADMIN — Medication 1 DROP(S): at 13:38

## 2018-09-25 RX ADMIN — Medication 1: at 13:23

## 2018-09-25 NOTE — PROVIDER CONTACT NOTE (CRITICAL VALUE NOTIFICATION) - NS PROVIDER READ BACK
Patient is a 88y old  Male who presents with a chief complaint of weakness, UTI, ROGER,CKD stage 3 , dehydration (2018 23:16)  demented patient with + blood cultures  bladder outlet obstruction  bedridden from spinal stenosis    INTERVAL HPI/OVERNIGHT EVENTS: has Indwelling obrien catheter  PAST MEDICAL & SURGICAL HISTORY:  Hydrocele in adult: bolateral, chronic  Renal insufficiency  Dehydration  Spinal stenosis of lumbar region  BPH (benign prostatic hypertrophy)  Hypertension  Benign Prostatic Hyperplasia  HTN (Hypertension)  No significant past surgical history      MEDICATIONS  (STANDING):  amLODIPine   Tablet 5 milliGRAM(s) Oral daily  enoxaparin Injectable 30 milliGRAM(s) SubCutaneous daily  finasteride 5 milliGRAM(s) Oral daily  iron sucrose IVPB 100 milliGRAM(s) IV Intermittent every 24 hours  labetalol 100 milliGRAM(s) Oral two times a day  megestrol Suspension 400 milliGRAM(s) Oral daily  pantoprazole  Injectable 40 milliGRAM(s) IV Push two times a day  piperacillin/tazobactam IVPB. 3.375 Gram(s) IV Intermittent every 12 hours  senna 2 Tablet(s) Oral at bedtime  sodium chloride 0.9%. 1000 milliLiter(s) (100 mL/Hr) IV Continuous <Continuous>  tamsulosin 0.4 milliGRAM(s) Oral at bedtime    MEDICATIONS  (PRN):  acetaminophen  Suppository 650 milliGRAM(s) Rectal every 6 hours PRN For Temp greater than 38 C (100.4 F)  magnesium hydroxide Suspension 30 milliLiter(s) Oral daily PRN Constipation      Allergies    No Known Allergies    Intolerances        REVIEW OF SYSTEMS:  CONSTITUTIONAL: No fever, weight loss, or fatigue  EYES: No eye pain, visual disturbances, or discharge  ENMT:  No difficulty hearing, tinnitus, vertigo; No sinus or throat pain  NECK: No pain or stiffness  BREASTS: No pain, masses, or nipple discharge  RESPIRATORY: No cough, wheezing, chills or hemoptysis; No shortness of breath  CARDIOVASCULAR: No chest pain, palpitations, dizziness, or leg swelling  GASTROINTESTINAL: No abdominal or epigastric pain. No nausea, vomiting, or hematemesis; No diarrhea or constipation. No melena or hematochezia.  GENITOURINARY: No dysuria, frequency, hematuria, or incontinence  NEUROLOGICAL: No headaches, memory loss, loss of strength, numbness, or tremors  SKIN: No itching, burning, rashes, or lesions   LYMPH NODES: No enlarged glands  ENDOCRINE: No heat or cold intolerance; No hair loss  MUSCULOSKELETAL: No joint pain or swelling; No muscle, back, or extremity pain  PSYCHIATRIC: No depression, anxiety, mood swings, or difficulty sleeping  HEME/LYMPH: No easy bruising, or bleeding gums  ALLERY AND IMMUNOLOGIC: No hives or eczema    Vital Signs Last 24 Hrs  T(C): 37.2 (2018 23:26), Max: 37.5 (2018 16:36)  T(F): 98.9 (2018 23:26), Max: 99.5 (2018 16:36)  HR: 94 (2018 23:) (72 - 94)  BP: 128/72 (2018 23:) (114/63 - 135/98)  BP(mean): --  RR: 18 (2018 23:26) (18 - 18)  SpO2: 100% (2018 23:) (98% - 100%)    PHYSICAL EXAM:  GENERAL: NAD, well-groomed, well-developed  HEAD:  Atraumatic, Normocephalic  EYES: EOMI, PERRLA, conjunctiva and sclera clear  ENMT: No tonsillar erythema, exudates, or enlargement; Moist mucous membranes, Good dentition, No lesions  NECK: Supple, No JVD, Normal thyroid  NERVOUS SYSTEM:  Alert & Oriented X3, Good concentration; Motor Strength 5/5 B/L upper and lower extremities; DTRs 2+ intact and symmetric  CHEST/LUNG: Clear to percussion bilaterally; No rales, rhonchi, wheezing, or rubs  HEART: Regular rate and rhythm; No murmurs, rubs, or gallops  ABDOMEN: Soft, Nontender, Nondistended; Bowel sounds present  EXTREMITIES:  2+ Peripheral Pulses, No clubbing, cyanosis, or edema  LYMPH: No lymphadenopathy noted  SKIN: No rashes or lesions    LABS:                        8.6    8.04  )-----------( 263      ( 2018 07:11 )             25.7     06-14    141  |  104  |  36<H>  ----------------------------<  92  3.3<L>   |  26  |  1.73<H>    Ca    9.2      2018 07:11          Urinalysis Basic - ( 2018 14:03 )    Color: Yellow / Appearance: very cloudy / S.015 / pH: x  Gluc: x / Ketone: Negative  / Bili: Negative / Urobili: Negative mg/dL   Blood: x / Protein: 100 mg/dL / Nitrite: Negative   Leuk Esterase: Moderate / RBC: 25-50 /HPF / WBC >50   Sq Epi: x / Non Sq Epi: Few / Bacteria: Many        Imaging Personally Reviewed:  [ ] YES  [ ] NO    Consultant(s) Notes Reviewed:  [ ] YES  [ ] NO    Care Discussed with Consultants/Other Providers [ ] YES  [ ] NO    Care discussed with family,         [  ]   yes  [  ]  No    imp:    stable[ ]    unstable[  ]     improving [   ]       unchanged  [  ]                Plans:  Continue present plans  [ x ] as per Gu and ID               New consult [  ]   specialty  .......               order test[  ]    test name.                  Discharge Planning  [  ] yes

## 2018-09-25 NOTE — PROGRESS NOTE ADULT - SUBJECTIVE AND OBJECTIVE BOX
Orthopaedic Surgery Progress Note    Subjective:   Patient seen and examined  No acute events overnight  Continues to have severe pain  Afebrile    Objective:  T(C): 37.2 (09-25-18 @ 06:04), Max: 37.6 (09-24-18 @ 21:38)  HR: 104 (09-25-18 @ 06:04) (98 - 108)  BP: 145/84 (09-25-18 @ 06:04) (105/64 - 145/84)  RR: 18 (09-25-18 @ 06:04) (18 - 18)  SpO2: 96% (09-25-18 @ 06:04) (91% - 96%)    09-23 @ 07:01  -  09-24 @ 07:00  --------------------------------------------------------  IN: 1490 mL / OUT: 880 mL / NET: 610 mL    09-24 @ 07:01  -  09-25 @ 06:28  --------------------------------------------------------  IN: 650 mL / OUT: 1500 mL / NET: -850 mL    PE  NAD  RLE:   incision healing well, wound vac in place  motor intact GS/TA/EHL  SILT S/S/SP/DP  WWP                      8.3    9.79  )-----------( 379      ( 24 Sep 2018 09:21 )             27.1     09-24    148<H>  |  109<H>  |  86<H>  ----------------------------<  137<H>  3.9   |  29  |  0.71    Ca    10.5      24 Sep 2018 07:30  Phos  4.0     09-24  Mg     2.0     09-24    A/P: 68 year old female s/p I&D, Explant s/p Distal Femur ORIF    - Pain Control  - PT/OT, OOB  - PICC  - Wound Vac per PRS    Kunal Mcgraw MD

## 2018-09-25 NOTE — PROGRESS NOTE ADULT - SUBJECTIVE AND OBJECTIVE BOX
CC: f/u for mrsa bacteremia and septic hip    Patient reports  nothing she is sleeping  REVIEW OF SYSTEMS:  All other review of systems negative (Comprehensive ROS)  cannot get  Antimicrobials Day #  :  minocycline 100 milliGRAM(s) Oral two times a day  nystatin    Suspension 236349 Unit(s) Oral four times a day    Other Medications Reviewed    T(F): 99.2 (09-25-18 @ 11:09), Max: 99.7 (09-24-18 @ 21:38)  HR: 102 (09-25-18 @ 11:09)  BP: 132/79 (09-25-18 @ 11:09)  RR: 18 (09-25-18 @ 11:09)  SpO2: 98% (09-25-18 @ 11:09)  Wt(kg): --    PHYSICAL EXAM:  General: sleeping soundly no acute distress  Eyes:  anicteric, no conjunctival injection, no discharge  Oropharynx: no lesions or injection 	  Neck: supple, without adenopathy  Lungs: clear to auscultation  Heart: regular rate and rhythm; no murmur, rubs or gallops  Abdomen: soft, nondistended, nontender, without mass or organomegaly, peg  Skin: no lesions  Extremities: no clubbing, cyanosis, or edema  Neurologic: sleeps    LAB RESULTS:                        8.8    10.91 )-----------( 435      ( 25 Sep 2018 07:58 )             28.8     09-25    148<H>  |  108  |  71<H>  ----------------------------<  138<H>  4.3   |  29  |  0.72    Ca    10.2      25 Sep 2018 07:03  Phos  4.0     09-24  Mg     2.0     09-24          MICROBIOLOGY:  RECENT CULTURES:  09-22 @ 01:09 .Blood Blood     No growth to date.    Growth in aerobic bottle: Gram Positive Rods        RADIOLOGY REVIEWED:      < from: Xray Chest 1 View- PORTABLE-Urgent (09.21.18 @ 12:12) >    IMPRESSION:    Trace left effusion        < end of copied text >  Assessment:  Patient with dementia s/p leisa for septic left hip, stormy post op course now on suppressive minocycline due to hardware in knee and prolonged bacteremia. S/p peg. no fever for past day. Positive culture with grp is a contaminant. No uncontrolled infection is apparent at present.   Plan:  continue minocycline

## 2018-09-25 NOTE — PROGRESS NOTE ADULT - SUBJECTIVE AND OBJECTIVE BOX
---___---___---___---___---___---___ ---___---___---___---___---___---___---___---___---___---                    <<<  M E D I C A L   A T T E N D I N G    F O L L O W    U P   N O T E  >>>    bun has been improving but now wbc is elevated      ---___---___---___---___---___---      <<<  MEDICATIONS:  >>>    MEDICATIONS  (STANDING):  artificial tears (preservative free) Ophthalmic Solution 1 Drop(s) Both EYES three times a day  buDESOnide   0.5 milliGRAM(s) Respule 0.5 milliGRAM(s) Inhalation every 12 hours  chlorhexidine 4% Liquid 1 Application(s) Topical <User Schedule>  clonazePAM Tablet 2 milliGRAM(s) Oral <User Schedule>  dextrose 5%. 1000 milliLiter(s) (50 mL/Hr) IV Continuous <Continuous>  dextrose 50% Injectable 12.5 Gram(s) IV Push once  dextrose 50% Injectable 25 Gram(s) IV Push once  dextrose 50% Injectable 25 Gram(s) IV Push once  enoxaparin Injectable 40 milliGRAM(s) SubCutaneous daily  ergocalciferol Drops 8000 Unit(s) Oral daily  fentaNYL   Patch  12 MICROgram(s)/Hr 1 Patch Transdermal every 72 hours  ferrous    sulfate Liquid 300 milliGRAM(s) Enteral Tube daily  insulin lispro (HumaLOG) corrective regimen sliding scale   SubCutaneous every 6 hours  insulin NPH human recombinant 8 Unit(s) SubCutaneous <User Schedule>  lidocaine   Patch 1 Patch Transdermal every 24 hours  minocycline 100 milliGRAM(s) Oral two times a day  mirtazapine 15 milliGRAM(s) Oral <User Schedule>  mupirocin 2% Ointment 1 Application(s) Topical two times a day  nystatin    Suspension 844055 Unit(s) Oral four times a day  pantoprazole  Injectable 40 milliGRAM(s) IV Push every 24 hours  predniSONE   Tablet 7.5 milliGRAM(s) Oral <User Schedule>  sertraline 50 milliGRAM(s) Oral daily      MEDICATIONS  (PRN):  acetaminophen    Suspension .. 650 milliGRAM(s) Oral every 8 hours PRN Mild Pain (1 - 3)  acetaminophen  IVPB .. 1000 milliGRAM(s) IV Intermittent every 8 hours PRN Mild Pain (1 - 3), Moderate Pain (4 - 6), Severe Pain (7 - 10)  dextrose 40% Gel 15 Gram(s) Oral once PRN Blood Glucose LESS THAN 70 milliGRAM(s)/deciLiter  glucagon  Injectable 1 milliGRAM(s) IntraMuscular once PRN Glucose <70 milliGRAM(s)/deciLiter  morphine  - Injectable 1 milliGRAM(s) IV Push every 4 hours PRN breakthrough pain  nystatin Powder 1 Application(s) Topical two times a day PRN rash/itchiness             ---___---___---___---___---___---          <<<  VITAL SIGNS: >>>    T(F): 99.2 (09-25-18 @ 11:09), Max: 99.7 (09-24-18 @ 21:38)  HR: 102 (09-25-18 @ 11:09) (102 - 108)  BP: 132/79 (09-25-18 @ 11:09) (105/64 - 145/84)  RR: 18 (09-25-18 @ 11:09) (18 - 18)  SpO2: 98% (09-25-18 @ 11:09) (91% - 98%)  Wt(kg): --  CAPILLARY BLOOD GLUCOSE      POCT Blood Glucose.: 188 mg/dL (25 Sep 2018 13:17)    I&O's Summary    24 Sep 2018 07:01  -  25 Sep 2018 07:00  --------------------------------------------------------  IN: 1650 mL / OUT: 1500 mL / NET: 150 mL         ---___---___---___---___---___---                       PHYSICAL EXAM:    GEN: A&O X 1 , NAD , comfortable  HEENT: NCAT, PERRL, MMM, no scleral icterus, hearing intact  NECK: Supple, No JVD  CVS: S1S2 , regular , No M/R/G appreciated  PULM: CTA B/L,  no W/R/R appreciated  ABD.: soft. non tender, non distended,  bowel sounds present  Extrem: intact pulses , no edema noted peg noted  Derm: No rash or ecchymosis noted wound still healing   PSYCH: normal mood, no depression, not anxious     ---___---___---___---___---___---     <<<  LAB AND IMAGING: >>>                          8.8    10.91 )-----------( 435      ( 25 Sep 2018 07:58 )             28.8               09-25    148<H>  |  108  |  71<H>  ----------------------------<  138<H>  4.3   |  29  |  0.72    Ca    10.2      25 Sep 2018 07:03  Phos  4.0     09-24  Mg     2.0     09-24                                 [All pertinent / recent available Imaging reports and other labs reviewed]     ---___---___---___---___---___---___ ---___---___---___---___---           <<<  A S S E S S M E N T   A N D   P L A N :  >>>          -GI/DVT Prophylaxis.    --------------------------------------------  Case discussed with   Education given on     >>______________________<<      Deniz Bolden .         phone   [phone]

## 2018-09-25 NOTE — PROGRESS NOTE ADULT - PROBLEM SELECTOR PLAN 2
-Vit D 25OH was 18.9 last week, would prefer to get her to high 20s before treating  -continue vit D 8,100 IU per PEG    Marilyn Schultz MD  [phone]  Marilyn Schultz MD  [phone]

## 2018-09-25 NOTE — PROGRESS NOTE ADULT - SUBJECTIVE AND OBJECTIVE BOX
Chief Complaint/Follow-up on: Elevated BS/OP    Subjective: Pt resting in bed on 4L NC. Her  is at the bedside. He notes she slept well overnight. Etiology of her fever is still unclear.    MEDICATIONS  (STANDING):  artificial tears (preservative free) Ophthalmic Solution 1 Drop(s) Both EYES three times a day  buDESOnide   0.5 milliGRAM(s) Respule 0.5 milliGRAM(s) Inhalation every 12 hours  chlorhexidine 4% Liquid 1 Application(s) Topical <User Schedule>  clonazePAM Tablet 2 milliGRAM(s) Oral <User Schedule>  dextrose 5%. 1000 milliLiter(s) (50 mL/Hr) IV Continuous <Continuous>  dextrose 50% Injectable 12.5 Gram(s) IV Push once  dextrose 50% Injectable 25 Gram(s) IV Push once  dextrose 50% Injectable 25 Gram(s) IV Push once  enoxaparin Injectable 40 milliGRAM(s) SubCutaneous daily  ergocalciferol Drops 8000 Unit(s) Oral daily  fentaNYL   Patch  12 MICROgram(s)/Hr 1 Patch Transdermal every 72 hours  ferrous    sulfate Liquid 300 milliGRAM(s) Enteral Tube daily  insulin lispro (HumaLOG) corrective regimen sliding scale   SubCutaneous every 6 hours  insulin NPH human recombinant 8 Unit(s) SubCutaneous <User Schedule>  lidocaine   Patch 1 Patch Transdermal every 24 hours  minocycline 100 milliGRAM(s) Oral two times a day  mirtazapine 15 milliGRAM(s) Oral <User Schedule>  mupirocin 2% Ointment 1 Application(s) Topical two times a day  nystatin    Suspension 391050 Unit(s) Oral four times a day  pantoprazole  Injectable 40 milliGRAM(s) IV Push every 24 hours  predniSONE   Tablet 7.5 milliGRAM(s) Oral <User Schedule>  sertraline 50 milliGRAM(s) Oral daily    MEDICATIONS  (PRN):  acetaminophen    Suspension .. 650 milliGRAM(s) Oral every 8 hours PRN Mild Pain (1 - 3)  acetaminophen  IVPB .. 1000 milliGRAM(s) IV Intermittent every 8 hours PRN Mild Pain (1 - 3), Moderate Pain (4 - 6), Severe Pain (7 - 10)  dextrose 40% Gel 15 Gram(s) Oral once PRN Blood Glucose LESS THAN 70 milliGRAM(s)/deciLiter  glucagon  Injectable 1 milliGRAM(s) IntraMuscular once PRN Glucose <70 milliGRAM(s)/deciLiter  morphine  - Injectable 1 milliGRAM(s) IV Push every 4 hours PRN breakthrough pain  nystatin Powder 1 Application(s) Topical two times a day PRN rash/itchiness      PHYSICAL EXAM:  VITALS: T(C): 37.3 (09-25-18 @ 11:09)  T(F): 99.2 (09-25-18 @ 11:09), Max: 99.7 (09-24-18 @ 21:38)  HR: 102 (09-25-18 @ 11:09) (102 - 108)  BP: 132/79 (09-25-18 @ 11:09) (105/64 - 145/84)  RR:  (18 - 18)  SpO2:  (91% - 98%)  Wt(kg): --  GENERAL: NAD, sleeping  HEENT:  Atraumatic, Normocephalic, moist mucous membranes  RESPIRATORY: Clear to auscultation bilaterally; No rales, rhonchi, wheezing, or rubs  CARDIOVASCULAR: Regular rate and rhythm; No murmurs; no peripheral edema  GI: Soft, nontender, non distended, normal bowel sounds, PEG in place      POCT Blood Glucose.: 188 mg/dL (09-25-18 @ 13:17)  POCT Blood Glucose.: 154 mg/dL (09-25-18 @ 06:19)  POCT Blood Glucose.: 120 mg/dL (09-24-18 @ 23:48)  POCT Blood Glucose.: 100 mg/dL (09-24-18 @ 22:10)  POCT Blood Glucose.: 161 mg/dL (09-24-18 @ 17:02)  POCT Blood Glucose.: 200 mg/dL (09-24-18 @ 12:59)  POCT Blood Glucose.: 133 mg/dL (09-24-18 @ 05:49)  POCT Blood Glucose.: 147 mg/dL (09-24-18 @ 00:21)  POCT Blood Glucose.: 159 mg/dL (09-23-18 @ 19:03)  POCT Blood Glucose.: 131 mg/dL (09-23-18 @ 16:08)  POCT Blood Glucose.: 207 mg/dL (09-23-18 @ 12:52)  POCT Blood Glucose.: 162 mg/dL (09-23-18 @ 06:20)  POCT Blood Glucose.: 142 mg/dL (09-23-18 @ 00:14)  POCT Blood Glucose.: 142 mg/dL (09-22-18 @ 17:43)    09-25    148<H>  |  108  |  71<H>  ----------------------------<  138<H>  4.3   |  29  |  0.72    EGFR if : 100  EGFR if non : 86    Ca    10.2      09-25  Mg     2.0     09-24  Phos  4.0     09-24      Hemoglobin A1C, Whole Blood: 5.5 % [4.0 - 5.6] (07-27-18 @ 09:02)

## 2018-09-26 DIAGNOSIS — S32.409A UNSPECIFIED FRACTURE OF UNSPECIFIED ACETABULUM, INITIAL ENCOUNTER FOR CLOSED FRACTURE: ICD-10-CM

## 2018-09-26 LAB
ANION GAP SERPL CALC-SCNC: 10 MMOL/L — SIGNIFICANT CHANGE UP (ref 5–17)
BASOPHILS # BLD AUTO: 0.03 K/UL — SIGNIFICANT CHANGE UP (ref 0–0.2)
BASOPHILS NFR BLD AUTO: 0.3 % — SIGNIFICANT CHANGE UP (ref 0–2)
BUN SERPL-MCNC: 59 MG/DL — HIGH (ref 7–23)
CALCIUM SERPL-MCNC: 10 MG/DL — SIGNIFICANT CHANGE UP (ref 8.4–10.5)
CHLORIDE SERPL-SCNC: 105 MMOL/L — SIGNIFICANT CHANGE UP (ref 96–108)
CO2 SERPL-SCNC: 29 MMOL/L — SIGNIFICANT CHANGE UP (ref 22–31)
CREAT SERPL-MCNC: 0.72 MG/DL — SIGNIFICANT CHANGE UP (ref 0.5–1.3)
EOSINOPHIL # BLD AUTO: 0.47 K/UL — SIGNIFICANT CHANGE UP (ref 0–0.5)
EOSINOPHIL NFR BLD AUTO: 5.3 % — SIGNIFICANT CHANGE UP (ref 0–6)
GLUCOSE BLDC GLUCOMTR-MCNC: 108 MG/DL — HIGH (ref 70–99)
GLUCOSE BLDC GLUCOMTR-MCNC: 116 MG/DL — HIGH (ref 70–99)
GLUCOSE BLDC GLUCOMTR-MCNC: 121 MG/DL — HIGH (ref 70–99)
GLUCOSE BLDC GLUCOMTR-MCNC: 178 MG/DL — HIGH (ref 70–99)
GLUCOSE SERPL-MCNC: 127 MG/DL — HIGH (ref 70–99)
GRAM STN FLD: SIGNIFICANT CHANGE UP
HCT VFR BLD CALC: 27.4 % — LOW (ref 34.5–45)
HGB BLD-MCNC: 8.1 G/DL — LOW (ref 11.5–15.5)
IMM GRANULOCYTES NFR BLD AUTO: 0.2 % — SIGNIFICANT CHANGE UP (ref 0–1.5)
LYMPHOCYTES # BLD AUTO: 1.21 K/UL — SIGNIFICANT CHANGE UP (ref 1–3.3)
LYMPHOCYTES # BLD AUTO: 13.7 % — SIGNIFICANT CHANGE UP (ref 13–44)
MCHC RBC-ENTMCNC: 29 PG — SIGNIFICANT CHANGE UP (ref 27–34)
MCHC RBC-ENTMCNC: 29.6 GM/DL — LOW (ref 32–36)
MCV RBC AUTO: 98.2 FL — SIGNIFICANT CHANGE UP (ref 80–100)
MONOCYTES # BLD AUTO: 0.73 K/UL — SIGNIFICANT CHANGE UP (ref 0–0.9)
MONOCYTES NFR BLD AUTO: 8.2 % — SIGNIFICANT CHANGE UP (ref 2–14)
NEUTROPHILS # BLD AUTO: 6.39 K/UL — SIGNIFICANT CHANGE UP (ref 1.8–7.4)
NEUTROPHILS NFR BLD AUTO: 72.3 % — SIGNIFICANT CHANGE UP (ref 43–77)
PLATELET # BLD AUTO: 397 K/UL — SIGNIFICANT CHANGE UP (ref 150–400)
POTASSIUM SERPL-MCNC: 4.2 MMOL/L — SIGNIFICANT CHANGE UP (ref 3.5–5.3)
POTASSIUM SERPL-SCNC: 4.2 MMOL/L — SIGNIFICANT CHANGE UP (ref 3.5–5.3)
RBC # BLD: 2.79 M/UL — LOW (ref 3.8–5.2)
RBC # FLD: 17.2 % — HIGH (ref 10.3–14.5)
SODIUM SERPL-SCNC: 144 MMOL/L — SIGNIFICANT CHANGE UP (ref 135–145)
WBC # BLD: 8.85 K/UL — SIGNIFICANT CHANGE UP (ref 3.8–10.5)
WBC # FLD AUTO: 8.85 K/UL — SIGNIFICANT CHANGE UP (ref 3.8–10.5)

## 2018-09-26 PROCEDURE — 99232 SBSQ HOSP IP/OBS MODERATE 35: CPT

## 2018-09-26 PROCEDURE — 71045 X-RAY EXAM CHEST 1 VIEW: CPT | Mod: 26

## 2018-09-26 RX ADMIN — Medication 300 MILLIGRAM(S): at 12:24

## 2018-09-26 RX ADMIN — MORPHINE SULFATE 1 MILLIGRAM(S): 50 CAPSULE, EXTENDED RELEASE ORAL at 12:52

## 2018-09-26 RX ADMIN — Medication 500000 UNIT(S): at 12:23

## 2018-09-26 RX ADMIN — MORPHINE SULFATE 1 MILLIGRAM(S): 50 CAPSULE, EXTENDED RELEASE ORAL at 13:45

## 2018-09-26 RX ADMIN — HUMAN INSULIN 8 UNIT(S): 100 INJECTION, SUSPENSION SUBCUTANEOUS at 06:12

## 2018-09-26 RX ADMIN — MORPHINE SULFATE 1 MILLIGRAM(S): 50 CAPSULE, EXTENDED RELEASE ORAL at 22:52

## 2018-09-26 RX ADMIN — MIRTAZAPINE 15 MILLIGRAM(S): 45 TABLET, ORALLY DISINTEGRATING ORAL at 22:38

## 2018-09-26 RX ADMIN — ENOXAPARIN SODIUM 40 MILLIGRAM(S): 100 INJECTION SUBCUTANEOUS at 12:24

## 2018-09-26 RX ADMIN — NYSTATIN CREAM 1 APPLICATION(S): 100000 CREAM TOPICAL at 12:25

## 2018-09-26 RX ADMIN — CHLORHEXIDINE GLUCONATE 1 APPLICATION(S): 213 SOLUTION TOPICAL at 06:12

## 2018-09-26 RX ADMIN — LIDOCAINE 1 PATCH: 4 CREAM TOPICAL at 12:20

## 2018-09-26 RX ADMIN — MORPHINE SULFATE 1 MILLIGRAM(S): 50 CAPSULE, EXTENDED RELEASE ORAL at 22:37

## 2018-09-26 RX ADMIN — SERTRALINE 50 MILLIGRAM(S): 25 TABLET, FILM COATED ORAL at 12:25

## 2018-09-26 RX ADMIN — Medication 0.5 MILLIGRAM(S): at 00:42

## 2018-09-26 RX ADMIN — PANTOPRAZOLE SODIUM 40 MILLIGRAM(S): 20 TABLET, DELAYED RELEASE ORAL at 12:24

## 2018-09-26 RX ADMIN — MORPHINE SULFATE 1 MILLIGRAM(S): 50 CAPSULE, EXTENDED RELEASE ORAL at 06:30

## 2018-09-26 RX ADMIN — Medication 2 MILLIGRAM(S): at 22:38

## 2018-09-26 RX ADMIN — MORPHINE SULFATE 1 MILLIGRAM(S): 50 CAPSULE, EXTENDED RELEASE ORAL at 06:11

## 2018-09-26 RX ADMIN — MUPIROCIN 1 APPLICATION(S): 20 OINTMENT TOPICAL at 06:11

## 2018-09-26 RX ADMIN — MINOCYCLINE HYDROCHLORIDE 100 MILLIGRAM(S): 45 TABLET, EXTENDED RELEASE ORAL at 06:12

## 2018-09-26 RX ADMIN — ERGOCALCIFEROL 8000 UNIT(S): 1.25 CAPSULE ORAL at 13:20

## 2018-09-26 RX ADMIN — Medication 1 DROP(S): at 13:03

## 2018-09-26 RX ADMIN — Medication 0.5 MILLIGRAM(S): at 12:23

## 2018-09-26 RX ADMIN — Medication 1 DROP(S): at 06:11

## 2018-09-26 RX ADMIN — Medication 500000 UNIT(S): at 06:11

## 2018-09-26 RX ADMIN — LIDOCAINE 1 PATCH: 4 CREAM TOPICAL at 22:39

## 2018-09-26 RX ADMIN — Medication 1: at 12:53

## 2018-09-26 RX ADMIN — Medication 1 DROP(S): at 22:38

## 2018-09-26 RX ADMIN — Medication 7.5 MILLIGRAM(S): at 06:12

## 2018-09-26 NOTE — PROGRESS NOTE ADULT - ASSESSMENT
Dementia  Behaviorally improved on current Remeron, Zoloft, Klonopin    Recommend  Continue current meds.   Follow QTc and hold Remeron and Zoloft if QTc is 500ms or greater.    Irvin Reyes M.D.  Psychiatry  [phone]

## 2018-09-26 NOTE — PROGRESS NOTE ADULT - SUBJECTIVE AND OBJECTIVE BOX
---___---___---___---___---___---___ ---___---___---___---___---___---___---___---___---___---                    <<<  M E D I C A L   A T T E N D I N G    F O L L O W    U P   N O T E  >>>    remains unchanged doing well     ---___---___---___---___---___---      <<<  MEDICATIONS:  >>>    MEDICATIONS  (STANDING):  artificial tears (preservative free) Ophthalmic Solution 1 Drop(s) Both EYES three times a day  buDESOnide   0.5 milliGRAM(s) Respule 0.5 milliGRAM(s) Inhalation every 12 hours  chlorhexidine 4% Liquid 1 Application(s) Topical <User Schedule>  clonazePAM Tablet 2 milliGRAM(s) Oral <User Schedule>  dextrose 5%. 1000 milliLiter(s) (50 mL/Hr) IV Continuous <Continuous>  dextrose 50% Injectable 12.5 Gram(s) IV Push once  dextrose 50% Injectable 25 Gram(s) IV Push once  dextrose 50% Injectable 25 Gram(s) IV Push once  enoxaparin Injectable 40 milliGRAM(s) SubCutaneous daily  ergocalciferol Drops 8000 Unit(s) Oral daily  fentaNYL   Patch  12 MICROgram(s)/Hr 1 Patch Transdermal every 72 hours  ferrous    sulfate Liquid 300 milliGRAM(s) Enteral Tube daily  insulin lispro (HumaLOG) corrective regimen sliding scale   SubCutaneous every 6 hours  insulin NPH human recombinant 8 Unit(s) SubCutaneous <User Schedule>  lidocaine   Patch 1 Patch Transdermal every 24 hours  minocycline 100 milliGRAM(s) Oral two times a day  mirtazapine 15 milliGRAM(s) Oral <User Schedule>  mupirocin 2% Ointment 1 Application(s) Topical two times a day  nystatin    Suspension [id number] Unit(s) Oral four times a day  pantoprazole  Injectable 40 milliGRAM(s) IV Push every 24 hours  predniSONE   Tablet 7.5 milliGRAM(s) Oral <User Schedule>  sertraline 50 milliGRAM(s) Oral daily      MEDICATIONS  (PRN):  acetaminophen    Suspension .. 650 milliGRAM(s) Oral every 8 hours PRN Mild Pain (1 - 3)  acetaminophen  IVPB .. 1000 milliGRAM(s) IV Intermittent every 8 hours PRN Mild Pain (1 - 3), Moderate Pain (4 - 6), Severe Pain (7 - 10)  dextrose 40% Gel 15 Gram(s) Oral once PRN Blood Glucose LESS THAN 70 milliGRAM(s)/deciLiter  glucagon  Injectable 1 milliGRAM(s) IntraMuscular once PRN Glucose <70 milliGRAM(s)/deciLiter  morphine  - Injectable 1 milliGRAM(s) IV Push every 4 hours PRN breakthrough pain  nystatin Powder 1 Application(s) Topical two times a day PRN rash/itchiness       ---___---___---___---___---___---     <<<REVIEW OF SYSTEM: >>>  unable to obtain         ---___---___---___---___---___---          <<<  VITAL SIGNS: >>>    T(F): 98 (09-26-18 @ 10:11), Max: 99.2 (09-25-18 @ 11:09)  HR: 97 (09-26-18 @ 10:11) (95 - 102)  BP: 121/74 (09-26-18 @ 10:11) (121/74 - 135/76)  RR: 18 (09-26-18 @ 10:11) (18 - 18)  SpO2: 95% (09-26-18 @ 10:11) (95% - 100%)  Wt(kg): --  CAPILLARY BLOOD GLUCOSE      POCT Blood Glucose.: 121 mg/dL (26 Sep 2018 06:03)    I&O's Summary    25 Sep 2018 07:01  -  26 Sep 2018 07:00  --------------------------------------------------------  IN: 1260 mL / OUT: 1050 mL / NET: 210 mL         ---___---___---___---___---___---                       PHYSICAL EXAM:    GEN: A&O X 1 , NAD , comfortable  HEENT: NCAT, PERRL, MMM, no scleral icterus, hearing intact  NECK: Supple, No JVD  CVS: S1S2 , regular , No M/R/G appreciated  PULM: CTA B/L,  no W/R/R appreciated  ABD.: soft. non tender, non distended,  bowel sounds present  Extrem: intact pulses , no edema noted  Derm: No rash or ecchymosis noted  PSYCH: depressed mood,  depression,  anxious     ---___---___---___---___---___---     <<<  LAB AND IMAGING: >>>                          8.1    8.85  )-----------( 397      ( 26 Sep 2018 08:00 )             27.4               09-26    144  |  105  |  59<H>  ----------------------------<  127<H>  4.2   |  29  |  0.72    Ca    10.0      26 Sep 2018 06:45                                 [All pertinent / recent available Imaging reports and other labs reviewed]     ---___---___---___---___---___---___ ---___---___---___---___---           <<<  A S S E S S M E N T   A N D   P L A N :  >>>          -GI/DVT Prophylaxis.    --------------------------------------------  Case discussed with   Education given on     >>______________________<<      Deniz Bolden .         phone   [phone]

## 2018-09-26 NOTE — PROGRESS NOTE ADULT - ASSESSMENT
69 yo female with dementia, history of UC, and s/p R Hip hemiarthroplasty 6/22/18, post op hematoma  Admitted after left hip fx, noted severe sepsis, MRSA bacteremia, infected R hip- s/p GABRIEL and spacer  Stormy post op course with subsequent episodes of suspected sepsis- unclear source, course of Cefepime  S/p PEG    9/10 CT of thigh without collection  GAYATHRI negative  CXR reviewed, no obvious signs of pneumonia, trace effusion only  Latest Bld Cxs 9/19 and 9/21 are negative  No obvious explanation for recent fevers which have moderated  Silent aspiration possible  WBC normal  I suspect recent positive blood cultures will be a contaminant, delay in growth and lack of clinical signs of infection support this.  Plan:  Completed 42 days of IV MRSA therapy 9/15, continue MCN via PEG  Wound care per PRS  Follow temps and CBC/diff   Follow conservatively off additional empiric Tx  Await ID of blood cultures,? diptheroid and contaminent

## 2018-09-26 NOTE — PROGRESS NOTE ADULT - SUBJECTIVE AND OBJECTIVE BOX
Events noted. No agitation.  angry about pt. not receiving more physical therapy and he fired PT. The pt. is more alert today and speaks a little. She now complains of knee pain. Sleeps well at night per  (who sleeps in her room).       Vital Signs Last 24 Hrs  T(C): 36.7 (26 Sep 2018 10:11), Max: 36.8 (25 Sep 2018 17:14)  T(F): 98 (26 Sep 2018 10:11), Max: 98.2 (25 Sep 2018 17:14)  HR: 97 (26 Sep 2018 10:11) (95 - 100)  BP: 121/74 (26 Sep 2018 10:11) (121/74 - 135/76)  BP(mean): --  RR: 18 (26 Sep 2018 10:11) (18 - 18)  SpO2: 95% (26 Sep 2018 10:11) (95% - 100%)                          8.1    8.85  )-----------( 397      ( 26 Sep 2018 08:00 )             27.4       09-26    144  |  105  |  59<H>  ----------------------------<  127<H>  4.2   |  29  |  0.72    Ca    10.0      26 Sep 2018 06:45                MEDICATIONS  (STANDING):  artificial tears (preservative free) Ophthalmic Solution 1 Drop(s) Both EYES three times a day  buDESOnide   0.5 milliGRAM(s) Respule 0.5 milliGRAM(s) Inhalation every 12 hours  chlorhexidine 4% Liquid 1 Application(s) Topical <User Schedule>  clonazePAM Tablet 2 milliGRAM(s) Oral <User Schedule>  dextrose 5%. 1000 milliLiter(s) (50 mL/Hr) IV Continuous <Continuous>  dextrose 50% Injectable 12.5 Gram(s) IV Push once  dextrose 50% Injectable 25 Gram(s) IV Push once  dextrose 50% Injectable 25 Gram(s) IV Push once  enoxaparin Injectable 40 milliGRAM(s) SubCutaneous daily  ergocalciferol Drops 8000 Unit(s) Oral daily  fentaNYL   Patch  12 MICROgram(s)/Hr 1 Patch Transdermal every 72 hours  ferrous    sulfate Liquid 300 milliGRAM(s) Enteral Tube daily  insulin lispro (HumaLOG) corrective regimen sliding scale   SubCutaneous every 6 hours  insulin NPH human recombinant 8 Unit(s) SubCutaneous <User Schedule>  lidocaine   Patch 1 Patch Transdermal every 24 hours  minocycline 100 milliGRAM(s) Oral two times a day  mirtazapine 15 milliGRAM(s) Oral <User Schedule>  mupirocin 2% Ointment 1 Application(s) Topical two times a day  nystatin    Suspension 002527 Unit(s) Oral four times a day  pantoprazole  Injectable 40 milliGRAM(s) IV Push every 24 hours  predniSONE   Tablet 7.5 milliGRAM(s) Oral <User Schedule>  sertraline 50 milliGRAM(s) Oral daily    MEDICATIONS  (PRN):  acetaminophen    Suspension .. 650 milliGRAM(s) Oral every 8 hours PRN Mild Pain (1 - 3)  acetaminophen  IVPB .. 1000 milliGRAM(s) IV Intermittent every 8 hours PRN Mild Pain (1 - 3), Moderate Pain (4 - 6), Severe Pain (7 - 10)  dextrose 40% Gel 15 Gram(s) Oral once PRN Blood Glucose LESS THAN 70 milliGRAM(s)/deciLiter  glucagon  Injectable 1 milliGRAM(s) IntraMuscular once PRN Glucose <70 milliGRAM(s)/deciLiter  morphine  - Injectable 1 milliGRAM(s) IV Push every 4 hours PRN breakthrough pain  nystatin Powder 1 Application(s) Topical two times a day PRN rash/itchiness      Elderly WF in bed, calm, cooperative, alert and oriented x 1 .  No psychomotor abnormalities. Insight and judgment are poor. Speech is coherent. Speaks a few words. Normal rate and low volume. No hallucinations nor delusions. The patient denied suicidal and homicidal ideation and plan. Mood is euthymic and affect constricted.  Attention and concentration fair but impaired short term memory and long term memory.

## 2018-09-26 NOTE — PROGRESS NOTE ADULT - SUBJECTIVE AND OBJECTIVE BOX
CC: f/u for bacteremia    Patient reports: she is non verbal, sitting in chair, incontinent of stool.    REVIEW OF SYSTEMS:  All other review of systems negative (Comprehensive ROS)    Antimicrobials Day #  :long term  minocycline 100 milliGRAM(s) Oral two times a day  nystatin    Suspension 113077 Unit(s) Oral four times a day    Other Medications Reviewed    T(F): 97.7 (09-26-18 @ 17:12), Max: 98.2 (09-25-18 @ 23:54)  HR: 101 (09-26-18 @ 17:12)  BP: 142/80 (09-26-18 @ 17:12)  RR: 18 (09-26-18 @ 17:12)  SpO2: 90% (09-26-18 @ 17:12)  Wt(kg): --    PHYSICAL EXAM:  General: awake, no acute distress  Eyes:  anicteric, no conjunctival injection, no discharge  Oropharynx: no lesions or injection 	  Neck: supple, without adenopathy  Lungs: clear to auscultation, poor inspiratory effert  Heart: regular rate and rhythm; no murmur, rubs or gallops  Abdomen: soft, nondistended, nontender, without mass or organomegaly  Skin: no lesions  Extremities: no clubbing, cyanosis, or edema  Neurologic: awake, poorly interactive  Rt thigh with wound vac in place    LAB RESULTS:                        8.1    8.85  )-----------( 397      ( 26 Sep 2018 08:00 )             27.4     09-26    144  |  105  |  59<H>  ----------------------------<  127<H>  4.2   |  29  |  0.72    Ca    10.0      26 Sep 2018 06:45          MICROBIOLOGY:  RECENT CULTURES:  09-22 @ 01:09 .Blood Blood     Growth in aerobic bottle: Gram Positive Rods    Growth in aerobic bottle: Gram Positive Rods        RADIOLOGY REVIEWED:    < from: Xray Chest 1 View- PORTABLE-Urgent (09.26.18 @ 14:34) >  IMPRESSION:    Left-sided PICC line is unchanged in position with its tip overlying the   superior vena cava. The cardiomediastinal silhouette is stable. No   pneumothorax is identified on this projection.   There is atelectasis at the left lung base, slightly improved since the   prior study. There is a probable trace left pleural fluid. There are   multiple bilateral old rib fractures.

## 2018-09-26 NOTE — PROGRESS NOTE ADULT - SUBJECTIVE AND OBJECTIVE BOX
[facility] DIVISION OF KIDNEY DISEASES AND HYPERTENSION -- FOLLOW UP NOTE  --------------------------------------------------------------------------------  Chief Complaint:/subjective: no acute events    24 hour events: as above        PAST HISTORY  --------------------------------------------------------------------------------  No significant changes to PMH, PSH, FHx, SHx, unless otherwise noted    ALLERGIES & MEDICATIONS  --------------------------------------------------------------------------------  Allergies    ASA; dye contrast (Anaphylaxis)  aspirin (Short breath)  divalproex sodium (Other (Unknown))  Haldol (Other (Unknown))  penicillin (Short breath; Rash)  sulfa drugs (Short breath; Rash)  Xanax (Other (Unknown))    Intolerances      Standing Inpatient Medications  artificial tears (preservative free) Ophthalmic Solution 1 Drop(s) Both EYES three times a day  buDESOnide   0.5 milliGRAM(s) Respule 0.5 milliGRAM(s) Inhalation every 12 hours  chlorhexidine 4% Liquid 1 Application(s) Topical <User Schedule>  clonazePAM Tablet 2 milliGRAM(s) Oral <User Schedule>  dextrose 5%. 1000 milliLiter(s) IV Continuous <Continuous>  dextrose 50% Injectable 12.5 Gram(s) IV Push once  dextrose 50% Injectable 25 Gram(s) IV Push once  dextrose 50% Injectable 25 Gram(s) IV Push once  enoxaparin Injectable 40 milliGRAM(s) SubCutaneous daily  ergocalciferol Drops 8000 Unit(s) Oral daily  fentaNYL   Patch  12 MICROgram(s)/Hr 1 Patch Transdermal every 72 hours  ferrous    sulfate Liquid 300 milliGRAM(s) Enteral Tube daily  insulin lispro (HumaLOG) corrective regimen sliding scale   SubCutaneous every 6 hours  insulin NPH human recombinant 8 Unit(s) SubCutaneous <User Schedule>  lidocaine   Patch 1 Patch Transdermal every 24 hours  minocycline 100 milliGRAM(s) Oral two times a day  mirtazapine 15 milliGRAM(s) Oral <User Schedule>  mupirocin 2% Ointment 1 Application(s) Topical two times a day  nystatin    Suspension 626554 Unit(s) Oral four times a day  pantoprazole  Injectable 40 milliGRAM(s) IV Push every 24 hours  predniSONE   Tablet 7.5 milliGRAM(s) Oral <User Schedule>  sertraline 50 milliGRAM(s) Oral daily    PRN Inpatient Medications  acetaminophen    Suspension .. 650 milliGRAM(s) Oral every 8 hours PRN  acetaminophen  IVPB .. 1000 milliGRAM(s) IV Intermittent every 8 hours PRN  dextrose 40% Gel 15 Gram(s) Oral once PRN  glucagon  Injectable 1 milliGRAM(s) IntraMuscular once PRN  morphine  - Injectable 1 milliGRAM(s) IV Push every 4 hours PRN  nystatin Powder 1 Application(s) Topical two times a day PRN      REVIEW OF SYSTEMS  --------------------------------------------------------------------------------   unable to obtain  All other systems were reviewed and are negative, except as noted.    VITALS/PHYSICAL EXAM  --------------------------------------------------------------------------------  T(C): 36.5 (09-26-18 @ 05:59), Max: 37.3 (09-25-18 @ 11:09)  HR: 95 (09-26-18 @ 05:59) (95 - 102)  BP: 123/72 (09-26-18 @ 05:59) (123/72 - 135/76)  RR: 18 (09-26-18 @ 05:59) (18 - 18)  SpO2: 96% (09-26-18 @ 05:59) (95% - 100%)  Wt(kg): --  Adult Advanced Hemodynamics Last 24 Hrs  ABP: --  ABP(mean): --  CVP(mm Hg): --  CO: --  CI: --  PA: --  PA(mean): --  PCWP: --  SVR: --  SVRI: --        09-25-18 @ 07:01  -  09-26-18 @ 07:00  --------------------------------------------------------  IN: 1260 mL / OUT: 1050 mL / NET: 210 mL      Physical Exam:  	Gen: NAD,    	HEENT:  no jvp  	Pulm: CTA B/L  	CV: RRR, S1S2; no rub  	Back:  no sacral edema  	Abd: +BS, soft,    	: No suprapubic tenderness +gavin  	Ext: no edema  	Neuro:  awake  	Psych: awake  	Skin: Warm,   	Vascular access:    LABS/STUDIES  --------------------------------------------------------------------------------              8.1    8.85  >-----------<  397      [09-26-18 @ 08:00]              27.4     Hemoglobin: 8.1 g/dL (09-26-18 @ 08:00)  Hemoglobin: 8.8 g/dL (09-25-18 @ 07:58)    Platelet Count - Automated: 397 K/uL (09-26-18 @ 08:00)  Platelet Count - Automated: 435 K/uL (09-25-18 @ 07:58)    144  |  105  |  59  ----------------------------<  127      [09-26-18 @ 06:45]  4.2   |  29  |  0.72        Ca     10.0     [09-26-18 @ 06:45]            Creatinine Trend:  SCr 0.72 [09-26 @ 06:45]  SCr 0.72 [09-25 @ 07:03]  SCr 0.71 [09-24 @ 07:30]  SCr 0.80 [09-23 @ 07:29]  SCr 0.76 [09-22 @ 07:06]    Urinalysis - [09-19-18 @ 08:11]      Color Yellow / Appearance Slightly Turbid / SG 1.018 / pH 6.0      Gluc Negative / Ketone Negative  / Bili Negative / Urobili Negative       Blood Moderate / Protein 100 / Leuk Est Small / Nitrite Negative      RBC 73 / WBC 26 / Hyaline 3 / Gran 5 / Sq Epi  / Non Sq Epi 10 / Bacteria Few    Urine Sodium 99      [09-23-18 @ 20:30]  Urine Osmolality 617      [09-23-18 @ 22:48]    Iron 14, TIBC 85, %sat 16      [08-14-18 @ 05:07]  PTH -- (Ca 8.6)      [09-11-18 @ 09:14]   52  Vitamin D (25OH) 18.9      [09-11-18 @ 09:04]  HbA1c 5.5      [07-27-18 @ 09:02]  TSH 0.95      [07-27-18 @ 09:02]

## 2018-09-26 NOTE — PROGRESS NOTE ADULT - ASSESSMENT
68 year old female w/hyperglycemia due to chronic steroid use, on continuous tube feeds, here w/weakness and MRSA bacteremia w/wound vac. BG values at goal over past 2 days on present insulin regimen. (BG goal 100-200mg/dl). Stable from Endocrine standpoint and awaiting rehab so will sign off at this time.

## 2018-09-26 NOTE — PROGRESS NOTE ADULT - ATTENDING COMMENTS
Hypernatremia and prerenal azotemia  -in the setting of volume depletion  -sodium improving, BUn improving  -started free water via  q6hr  -IVF as needed  -has leslye    Increased BUN: prerenal+steroids+feeds which are high in protein--feeds- nutrition follow up

## 2018-09-26 NOTE — PROGRESS NOTE ADULT - PROBLEM SELECTOR PLAN 2
-Vit D 25OH was 18.9, would prefer to get her to high 20s before treating  -continue vit D 8,100 IU per PEG

## 2018-09-26 NOTE — PROGRESS NOTE ADULT - PROBLEM SELECTOR PLAN 1
-test BG Q6h  -c/w NPH 8 units QAM with prednisone 7.5mg daily  -c/w Humalog low correction scale Q6h  Upon discharge to rehab: can c/w NPH 8 units daily w/AM prednisone if remains on continuous tube feeds. Once home may be able to do Amaryl 1mg daily in AM. Family will need glucometer and supplies once pt is discharged home.  -will sign off at this time. Please contact Endocrine team if additional assistance is required.   pager: [phone]

## 2018-09-26 NOTE — PROGRESS NOTE ADULT - SUBJECTIVE AND OBJECTIVE BOX
Diabetes Follow up note:  Interval Hx:  68 year old female w/steroid induced hyperglycemia on chronic prednisone on continuous tube feeds. BG values at goal on present insulin regimen over last few days. Pt seen at bedside w/ present. Hoping to get her into rehab this week. Tolerating TF at goal.       Review of Systems:  Pt w/out complaint. Restless in chair.     MEDS:    insulin lispro (HumaLOG) corrective regimen sliding scale   SubCutaneous every 6 hours  insulin NPH human recombinant 8 Unit(s) SubCutaneous <User Schedule>  predniSONE   Tablet 7.5 milliGRAM(s) Oral <User Schedule>    minocycline 100 milliGRAM(s) Oral two times a day  nystatin    Suspension 852590 Unit(s) Oral four times a day    Allergies    ASA; dye contrast (Anaphylaxis)  aspirin (Short breath)  divalproex sodium (Other (Unknown))  Haldol (Other (Unknown))  penicillin (Short breath; Rash)  sulfa drugs (Short breath; Rash)  Xanax (Other (Unknown))        PE:  General: Female sitting in chair. NAD.   Vital Signs Last 24 Hrs  T(C): 36.7 (26 Sep 2018 10:11), Max: 36.8 (25 Sep 2018 17:14)  T(F): 98 (26 Sep 2018 10:11), Max: 98.2 (25 Sep 2018 17:14)  HR: 97 (26 Sep 2018 10:11) (95 - 100)  BP: 121/74 (26 Sep 2018 10:11) (121/74 - 135/76)  BP(mean): --  RR: 18 (26 Sep 2018 10:11) (18 - 18)  SpO2: 95% (26 Sep 2018 10:11) (95% - 100%)  Abd: Soft, NT,ND, + PEG   Extremities: Warm. RLE wound vac in place.   Neuro: A&O X3    LABS:    POCT Blood Glucose.: 178 mg/dL (09-26-18 @ 12:26)  POCT Blood Glucose.: 121 mg/dL (09-26-18 @ 06:03)  POCT Blood Glucose.: 116 mg/dL (09-25-18 @ 23:51)  POCT Blood Glucose.: 121 mg/dL (09-25-18 @ 21:58)  POCT Blood Glucose.: 124 mg/dL (09-25-18 @ 17:10)  POCT Blood Glucose.: 188 mg/dL (09-25-18 @ 13:17)  POCT Blood Glucose.: 154 mg/dL (09-25-18 @ 06:19)  POCT Blood Glucose.: 120 mg/dL (09-24-18 @ 23:48)  POCT Blood Glucose.: 100 mg/dL (09-24-18 @ 22:10)  POCT Blood Glucose.: 161 mg/dL (09-24-18 @ 17:02)  POCT Blood Glucose.: 200 mg/dL (09-24-18 @ 12:59)  POCT Blood Glucose.: 133 mg/dL (09-24-18 @ 05:49)  POCT Blood Glucose.: 147 mg/dL (09-24-18 @ 00:21)  POCT Blood Glucose.: 159 mg/dL (09-23-18 @ 19:03)  POCT Blood Glucose.: 131 mg/dL (09-23-18 @ 16:08)                            8.1    8.85  )-----------( 397      ( 26 Sep 2018 08:00 )             27.4       09-26    144  |  105  |  59<H>  ----------------------------<  127<H>  4.2   |  29  |  0.72    Ca    10.0      26 Sep 2018 06:45          Hemoglobin A1C, Whole Blood: 5.5 % [4.0 - 5.6] (07-27-18 @ 09:02)            Contact number: darian [phone] or [phone]

## 2018-09-27 DIAGNOSIS — S32.409S: ICD-10-CM

## 2018-09-27 LAB
ANION GAP SERPL CALC-SCNC: 15 MMOL/L — SIGNIFICANT CHANGE UP (ref 5–17)
BASOPHILS # BLD AUTO: 0.02 K/UL — SIGNIFICANT CHANGE UP (ref 0–0.2)
BASOPHILS NFR BLD AUTO: 0.3 % — SIGNIFICANT CHANGE UP (ref 0–2)
BUN SERPL-MCNC: 52 MG/DL — HIGH (ref 7–23)
CALCIUM SERPL-MCNC: 10.1 MG/DL — SIGNIFICANT CHANGE UP (ref 8.4–10.5)
CHLORIDE SERPL-SCNC: 100 MMOL/L — SIGNIFICANT CHANGE UP (ref 96–108)
CO2 SERPL-SCNC: 24 MMOL/L — SIGNIFICANT CHANGE UP (ref 22–31)
CREAT SERPL-MCNC: 0.7 MG/DL — SIGNIFICANT CHANGE UP (ref 0.5–1.3)
EOSINOPHIL # BLD AUTO: 0.38 K/UL — SIGNIFICANT CHANGE UP (ref 0–0.5)
EOSINOPHIL NFR BLD AUTO: 5.1 % — SIGNIFICANT CHANGE UP (ref 0–6)
GLUCOSE BLDC GLUCOMTR-MCNC: 105 MG/DL — HIGH (ref 70–99)
GLUCOSE BLDC GLUCOMTR-MCNC: 114 MG/DL — HIGH (ref 70–99)
GLUCOSE BLDC GLUCOMTR-MCNC: 184 MG/DL — HIGH (ref 70–99)
GLUCOSE BLDC GLUCOMTR-MCNC: 95 MG/DL — SIGNIFICANT CHANGE UP (ref 70–99)
GLUCOSE SERPL-MCNC: 135 MG/DL — HIGH (ref 70–99)
HCT VFR BLD CALC: 26.8 % — LOW (ref 34.5–45)
HGB BLD-MCNC: 8.6 G/DL — LOW (ref 11.5–15.5)
IMM GRANULOCYTES NFR BLD AUTO: 0.3 % — SIGNIFICANT CHANGE UP (ref 0–1.5)
LYMPHOCYTES # BLD AUTO: 1.18 K/UL — SIGNIFICANT CHANGE UP (ref 1–3.3)
LYMPHOCYTES # BLD AUTO: 16 % — SIGNIFICANT CHANGE UP (ref 13–44)
MCHC RBC-ENTMCNC: 31.2 PG — SIGNIFICANT CHANGE UP (ref 27–34)
MCHC RBC-ENTMCNC: 32.1 GM/DL — SIGNIFICANT CHANGE UP (ref 32–36)
MCV RBC AUTO: 97.1 FL — SIGNIFICANT CHANGE UP (ref 80–100)
MONOCYTES # BLD AUTO: 0.69 K/UL — SIGNIFICANT CHANGE UP (ref 0–0.9)
MONOCYTES NFR BLD AUTO: 9.3 % — SIGNIFICANT CHANGE UP (ref 2–14)
NEUTROPHILS # BLD AUTO: 5.09 K/UL — SIGNIFICANT CHANGE UP (ref 1.8–7.4)
NEUTROPHILS NFR BLD AUTO: 69 % — SIGNIFICANT CHANGE UP (ref 43–77)
PLATELET # BLD AUTO: 353 K/UL — SIGNIFICANT CHANGE UP (ref 150–400)
POTASSIUM SERPL-MCNC: 4.5 MMOL/L — SIGNIFICANT CHANGE UP (ref 3.5–5.3)
POTASSIUM SERPL-SCNC: 4.5 MMOL/L — SIGNIFICANT CHANGE UP (ref 3.5–5.3)
RBC # BLD: 2.76 M/UL — LOW (ref 3.8–5.2)
RBC # FLD: 17.4 % — HIGH (ref 10.3–14.5)
SODIUM SERPL-SCNC: 139 MMOL/L — SIGNIFICANT CHANGE UP (ref 135–145)
WBC # BLD: 7.38 K/UL — SIGNIFICANT CHANGE UP (ref 3.8–10.5)
WBC # FLD AUTO: 7.38 K/UL — SIGNIFICANT CHANGE UP (ref 3.8–10.5)

## 2018-09-27 PROCEDURE — 93970 EXTREMITY STUDY: CPT | Mod: 26

## 2018-09-27 RX ORDER — MORPHINE SULFATE 50 MG/1
0.5 CAPSULE, EXTENDED RELEASE ORAL ONCE
Qty: 0 | Refills: 0 | Status: DISCONTINUED | OUTPATIENT
Start: 2018-09-27 | End: 2018-09-27

## 2018-09-27 RX ADMIN — MORPHINE SULFATE 0.5 MILLIGRAM(S): 50 CAPSULE, EXTENDED RELEASE ORAL at 15:00

## 2018-09-27 RX ADMIN — PANTOPRAZOLE SODIUM 40 MILLIGRAM(S): 20 TABLET, DELAYED RELEASE ORAL at 13:26

## 2018-09-27 RX ADMIN — NYSTATIN CREAM 1 APPLICATION(S): 100000 CREAM TOPICAL at 13:27

## 2018-09-27 RX ADMIN — MORPHINE SULFATE 1 MILLIGRAM(S): 50 CAPSULE, EXTENDED RELEASE ORAL at 14:25

## 2018-09-27 RX ADMIN — MORPHINE SULFATE 1 MILLIGRAM(S): 50 CAPSULE, EXTENDED RELEASE ORAL at 18:05

## 2018-09-27 RX ADMIN — MORPHINE SULFATE 1 MILLIGRAM(S): 50 CAPSULE, EXTENDED RELEASE ORAL at 22:50

## 2018-09-27 RX ADMIN — CHLORHEXIDINE GLUCONATE 1 APPLICATION(S): 213 SOLUTION TOPICAL at 05:47

## 2018-09-27 RX ADMIN — Medication 7.5 MILLIGRAM(S): at 05:46

## 2018-09-27 RX ADMIN — Medication 0.5 MILLIGRAM(S): at 13:25

## 2018-09-27 RX ADMIN — MIRTAZAPINE 15 MILLIGRAM(S): 45 TABLET, ORALLY DISINTEGRATING ORAL at 22:35

## 2018-09-27 RX ADMIN — Medication 500000 UNIT(S): at 00:23

## 2018-09-27 RX ADMIN — MORPHINE SULFATE 1 MILLIGRAM(S): 50 CAPSULE, EXTENDED RELEASE ORAL at 13:20

## 2018-09-27 RX ADMIN — Medication 1 DROP(S): at 22:35

## 2018-09-27 RX ADMIN — MINOCYCLINE HYDROCHLORIDE 100 MILLIGRAM(S): 45 TABLET, EXTENDED RELEASE ORAL at 18:04

## 2018-09-27 RX ADMIN — MINOCYCLINE HYDROCHLORIDE 100 MILLIGRAM(S): 45 TABLET, EXTENDED RELEASE ORAL at 05:46

## 2018-09-27 RX ADMIN — MUPIROCIN 1 APPLICATION(S): 20 OINTMENT TOPICAL at 05:45

## 2018-09-27 RX ADMIN — MORPHINE SULFATE 1 MILLIGRAM(S): 50 CAPSULE, EXTENDED RELEASE ORAL at 18:38

## 2018-09-27 RX ADMIN — Medication 500000 UNIT(S): at 18:04

## 2018-09-27 RX ADMIN — Medication 2 MILLIGRAM(S): at 22:44

## 2018-09-27 RX ADMIN — ENOXAPARIN SODIUM 40 MILLIGRAM(S): 100 INJECTION SUBCUTANEOUS at 13:25

## 2018-09-27 RX ADMIN — Medication 1: at 13:22

## 2018-09-27 RX ADMIN — LIDOCAINE 1 PATCH: 4 CREAM TOPICAL at 22:36

## 2018-09-27 RX ADMIN — Medication 1 DROP(S): at 05:47

## 2018-09-27 RX ADMIN — Medication 0.5 MILLIGRAM(S): at 00:23

## 2018-09-27 RX ADMIN — HUMAN INSULIN 8 UNIT(S): 100 INJECTION, SUSPENSION SUBCUTANEOUS at 06:07

## 2018-09-27 RX ADMIN — ERGOCALCIFEROL 8000 UNIT(S): 1.25 CAPSULE ORAL at 17:55

## 2018-09-27 RX ADMIN — Medication 500000 UNIT(S): at 05:46

## 2018-09-27 RX ADMIN — Medication 1 DROP(S): at 13:26

## 2018-09-27 RX ADMIN — Medication 650 MILLIGRAM(S): at 16:29

## 2018-09-27 RX ADMIN — Medication 500000 UNIT(S): at 13:25

## 2018-09-27 RX ADMIN — MORPHINE SULFATE 0.5 MILLIGRAM(S): 50 CAPSULE, EXTENDED RELEASE ORAL at 14:40

## 2018-09-27 RX ADMIN — Medication 650 MILLIGRAM(S): at 17:25

## 2018-09-27 RX ADMIN — LIDOCAINE 1 PATCH: 4 CREAM TOPICAL at 11:24

## 2018-09-27 RX ADMIN — SERTRALINE 50 MILLIGRAM(S): 25 TABLET, FILM COATED ORAL at 13:27

## 2018-09-27 RX ADMIN — MUPIROCIN 1 APPLICATION(S): 20 OINTMENT TOPICAL at 18:06

## 2018-09-27 RX ADMIN — MORPHINE SULFATE 1 MILLIGRAM(S): 50 CAPSULE, EXTENDED RELEASE ORAL at 22:35

## 2018-09-27 RX ADMIN — NYSTATIN CREAM 1 APPLICATION(S): 100000 CREAM TOPICAL at 05:45

## 2018-09-27 RX ADMIN — Medication 300 MILLIGRAM(S): at 13:26

## 2018-09-27 NOTE — PROGRESS NOTE ADULT - ASSESSMENT
67 yo female with dementia, history of UC, and s/p R Hip hemiarthroplasty 6/22/18, post op hematoma  Admitted after left hip fx, noted severe sepsis, MRSA bacteremia, infected R hip- s/p GABRIEL and spacer  Stormy post op course with subsequent episodes of suspected sepsis- unclear source, course of Cefepime  S/p PEG    9/10 CT of thigh without collection  GAYATHRI negative  CXR reviewed, no obvious signs of pneumonia, trace effusion only  Latest Bld Cxs 9/19 and 9/21 are negative  No obvious explanation for recent fevers which have moderated  Silent aspiration possible  WBC normal  I suspect recent positive blood cultures from 9/22 will be a contaminant, delay in growth and lack of clinical signs of infection support this.  Plan:  Completed 42 days of IV MRSA therapy 9/15, continue MCN via PEG, favor at least 6 months of therapy  Wound care per PRS  Follow temps and CBC/diff   Follow conservatively off additional empiric Tx  Await ID of blood cultures,? diptheroid and contaminent  Discharge planning per primary service  Daughter updated at bedside

## 2018-09-27 NOTE — PROGRESS NOTE ADULT - ASSESSMENT
mrsa sepsis   drug induced hyperglycemia   anxiety   depression   dysphonia   left hipfracture sequelae   infected hardware  copd        begin  dc planning

## 2018-09-27 NOTE — PROGRESS NOTE ADULT - SUBJECTIVE AND OBJECTIVE BOX
---___---___---___---___---___---___ ---___---___---___---___---___---___---___---___---___---                    <<<  M E D I C A L   A T T E N D I N G    F O L L O W    U P   N O T E  >>>    has been doing well. xray was negative    ---___---___---___---___---___---      <<<  MEDICATIONS:  >>>    MEDICATIONS  (STANDING):  artificial tears (preservative free) Ophthalmic Solution 1 Drop(s) Both EYES three times a day  buDESOnide   0.5 milliGRAM(s) Respule 0.5 milliGRAM(s) Inhalation every 12 hours  chlorhexidine 4% Liquid 1 Application(s) Topical <User Schedule>  clonazePAM Tablet 2 milliGRAM(s) Oral <User Schedule>  dextrose 5%. 1000 milliLiter(s) (50 mL/Hr) IV Continuous <Continuous>  dextrose 50% Injectable 12.5 Gram(s) IV Push once  dextrose 50% Injectable 25 Gram(s) IV Push once  dextrose 50% Injectable 25 Gram(s) IV Push once  enoxaparin Injectable 40 milliGRAM(s) SubCutaneous daily  ergocalciferol Drops 8000 Unit(s) Oral daily  fentaNYL   Patch  12 MICROgram(s)/Hr 1 Patch Transdermal every 72 hours  ferrous    sulfate Liquid 300 milliGRAM(s) Enteral Tube daily  insulin lispro (HumaLOG) corrective regimen sliding scale   SubCutaneous every 6 hours  insulin NPH human recombinant 8 Unit(s) SubCutaneous <User Schedule>  lidocaine   Patch 1 Patch Transdermal every 24 hours  minocycline 100 milliGRAM(s) Oral two times a day  mirtazapine 15 milliGRAM(s) Oral <User Schedule>  mupirocin 2% Ointment 1 Application(s) Topical two times a day  nystatin    Suspension 955509 Unit(s) Oral four times a day  pantoprazole  Injectable 40 milliGRAM(s) IV Push every 24 hours  predniSONE   Tablet 7.5 milliGRAM(s) Oral <User Schedule>  sertraline 50 milliGRAM(s) Oral daily      MEDICATIONS  (PRN):  acetaminophen    Suspension .. 650 milliGRAM(s) Oral every 8 hours PRN Mild Pain (1 - 3)  acetaminophen  IVPB .. 1000 milliGRAM(s) IV Intermittent every 8 hours PRN Mild Pain (1 - 3), Moderate Pain (4 - 6), Severe Pain (7 - 10)  dextrose 40% Gel 15 Gram(s) Oral once PRN Blood Glucose LESS THAN 70 milliGRAM(s)/deciLiter  glucagon  Injectable 1 milliGRAM(s) IntraMuscular once PRN Glucose <70 milliGRAM(s)/deciLiter  morphine  - Injectable 1 milliGRAM(s) IV Push every 4 hours PRN breakthrough pain  nystatin Powder 1 Application(s) Topical two times a day PRN rash/itchiness       ---___---___---___---___---___---     <<<REVIEW OF SYSTEM: >>>    GEN: no fever, no chills, no pain  RESP: no SOB, no cough, no sputum  CVS: no chest pain, no palpitations, no edema  GI: no abdominal pain, no nausea, no vomiting, no constipation, no diarrhea  : no dysurea, no frequency  NEURO: no headache, no dizziness  PSYCH: no depression, not anxious  Derm : no itching, no rash     ---___---___---___---___---___---          <<<  VITAL SIGNS: >>>    T(F): 98 (09-27-18 @ 15:00), Max: 98 (09-27-18 @ 15:00)  HR: 80 (09-27-18 @ 15:00) (80 - 92)  BP: 112/60 (09-27-18 @ 15:00) (112/60 - 132/82)  RR: 17 (09-27-18 @ 15:00) (17 - 18)  SpO2: 96% (09-27-18 @ 15:00) (94% - 97%)  Wt(kg): --  CAPILLARY BLOOD GLUCOSE      POCT Blood Glucose.: 105 mg/dL (27 Sep 2018 17:03)    I&O's Summary    26 Sep 2018 07:01  -  27 Sep 2018 07:00  --------------------------------------------------------  IN: 1960 mL / OUT: 1050 mL / NET: 910 mL    27 Sep 2018 07:01  -  27 Sep 2018 19:44  --------------------------------------------------------  IN: 1020 mL / OUT: 300 mL / NET: 720 mL         ---___---___---___---___---___---                       PHYSICAL EXAM:    GEN: A&O X 1 , NAD , comfortable  HEENT: NCAT, PERRL, MMM, no scleral icterus, hearing intact  NECK: Supple, No JVD  CVS: S1S2 , regular , No M/R/G appreciated  PULM: CTA B/L,  no W/R/R appreciated  ABD.: soft. non tender, non distended,  bowel sounds present peg noted   Extrem: intact pulses , no edema noted  Derm: No rash or ecchymosis noted wound healing   PSYCH:  mood,  depression,  anxious     ---___---___---___---___---___---     <<<  LAB AND IMAGING: >>>                          8.6    7.38  )-----------( 353      ( 27 Sep 2018 09:47 )             26.8               09-27    139  |  100  |  52<H>  ----------------------------<  135<H>  4.5   |  24  |  0.70    Ca    10.1      27 Sep 2018 16:38                                 [All pertinent / recent available Imaging reports and other labs reviewed]     ---___---___---___---___---___---___ ---___---___---___---___---           <<<  A S S E S S M E N T   DEO N YE   P L A N :  >>>          -GI/DVT Prophylaxis.    --------------------------------------------       >>______________________<<      Deniz Bolden .         phone   [phone]

## 2018-09-27 NOTE — PROGRESS NOTE ADULT - SUBJECTIVE AND OBJECTIVE BOX
CC: f/u for MRSA bacteremia and Rt prosthetic hip infection    Patient reports: she is more alert and animated than I have seen her    REVIEW OF SYSTEMS:  All other review of systems negative (Comprehensive ROS)    Antimicrobials Day #  :  minocycline 100 milliGRAM(s) Oral two times a day  nystatin    Suspension 017684 Unit(s) Oral four times a day    Other Medications Reviewed    T(F): 97.8 (09-27-18 @ 10:59), Max: 97.9 (09-27-18 @ 05:42)  HR: 92 (09-27-18 @ 10:59)  BP: 120/79 (09-27-18 @ 10:59)  RR: 18 (09-27-18 @ 10:59)  SpO2: 94% (09-27-18 @ 10:59)  Wt(kg): --    PHYSICAL EXAM:  General: alert, no acute distress  Eyes:  anicteric, no conjunctival injection, no discharge  Oropharynx: no lesions or injection 	  Neck: supple, without adenopathy  Lungs: clear to auscultation  Heart: regular rate and rhythm; no murmur, rubs or gallops  Abdomen: soft, nondistended, nontender, without mass or organomegaly  Skin: no lesions  Extremities: no clubbing, cyanosis, or edema  Neurologic: alert, baseline dementia moves all extremities  RT hip incision intact, wound vac in place  LAB RESULTS:                        8.6    7.38  )-----------( 353      ( 27 Sep 2018 09:47 )             26.8     09-26    144  |  105  |  59<H>  ----------------------------<  127<H>  4.2   |  29  |  0.72    Ca    10.0      26 Sep 2018 06:45          MICROBIOLOGY:  RECENT CULTURES:  9/22 blood cultures GPR in 2/4 bottles, likely contaminent    RADIOLOGY REVIEWED:  < from: [facility] Duplex Lower Ext Vein ScanArben (09.27.18 @ 14:01) >    IMPRESSION:     No evidence of bilateral lower extremity deep venous thrombosis.    < end of copied text >

## 2018-09-27 NOTE — CHART NOTE - NSCHARTNOTEFT_GEN_A_CORE
Malnutrition follow up per protocol. Enteral feeds tolerated well. No complaints from patient of private HHA. Patient looks better, weight gain noticeable.  Source: Patient [ ]    Family [ ]     other [X ]  HHA    Diet : NPO with tube feeding  Chart reviewed events noted. History of MRSA sepsis, dementia, Alzheimer's, wound dehiscence, anxiety, COPD, dysphonia, chronic pain> Not a candidate for swallow test per SLP.   S/P PEG           Enteral /Parenteral Nutrition: Glucerna 1.2 @ 35ml/hr x24 hrs      Current Weight: 115 lbs  admit Weight  105 lbs is accurate (patient is bedbound) however appears with weight gain    Pertinent Medications: MEDICATIONS  (STANDING):  artificial tears (preservative free) Ophthalmic Solution 1 Drop(s) Both EYES three times a day  buDESOnide   0.5 milliGRAM(s) Respule 0.5 milliGRAM(s) Inhalation every 12 hours  chlorhexidine 4% Liquid 1 Application(s) Topical <User Schedule>  clonazePAM Tablet 2 milliGRAM(s) Oral <User Schedule>  dextrose 5%. 1000 milliLiter(s) (50 mL/Hr) IV Continuous <Continuous>  dextrose 50% Injectable 12.5 Gram(s) IV Push once  dextrose 50% Injectable 25 Gram(s) IV Push once  dextrose 50% Injectable 25 Gram(s) IV Push once  enoxaparin Injectable 40 milliGRAM(s) SubCutaneous daily  ergocalciferol Drops 8000 Unit(s) Oral daily  fentaNYL   Patch  12 MICROgram(s)/Hr 1 Patch Transdermal every 72 hours  ferrous    sulfate Liquid 300 milliGRAM(s) Enteral Tube daily  insulin lispro (HumaLOG) corrective regimen sliding scale   SubCutaneous every 6 hours  insulin NPH human recombinant 8 Unit(s) SubCutaneous <User Schedule>  lidocaine   Patch 1 Patch Transdermal every 24 hours  minocycline 100 milliGRAM(s) Oral two times a day  mirtazapine 15 milliGRAM(s) Oral <User Schedule>  mupirocin 2% Ointment 1 Application(s) Topical two times a day  nystatin    Suspension 249079 Unit(s) Oral four times a day  pantoprazole  Injectable 40 milliGRAM(s) IV Push every 24 hours  predniSONE   Tablet 7.5 milliGRAM(s) Oral <User Schedule>  sertraline 50 milliGRAM(s) Oral daily    MEDICATIONS  (PRN):  acetaminophen    Suspension .. 650 milliGRAM(s) Oral every 8 hours PRN Mild Pain (1 - 3)  acetaminophen  IVPB .. 1000 milliGRAM(s) IV Intermittent every 8 hours PRN Mild Pain (1 - 3), Moderate Pain (4 - 6), Severe Pain (7 - 10)  dextrose 40% Gel 15 Gram(s) Oral once PRN Blood Glucose LESS THAN 70 milliGRAM(s)/deciLiter  glucagon  Injectable 1 milliGRAM(s) IntraMuscular once PRN Glucose <70 milliGRAM(s)/deciLiter  morphine  - Injectable 1 milliGRAM(s) IV Push every 4 hours PRN breakthrough pain  nystatin Powder 1 Application(s) Topical two times a day PRN rash/itchiness    Pertinent Labs:  09-27 Na139 mmol/L Glu 135 mg/dL<H> K+ 4.5 mmol/L Cr  0.70 mg/dL BUN 52 mg/dL<H> 09-24 Phos 4.0 mg/dL    GI bm x2, no diarrhea  Skin: sacral st II    Estimated Needs:   [ ] no change since previous assessment  [ ] recalculated:       Previous Nutrition Diagnosis:    [X ] Malnutrition          Nutrition Diagnosis is [X ] ongoing  [ ] resolved [ ] not applicable          New Nutrition Diagnosis: [X ] not applicable    [    Recommendations      [X ] Nutrition Support  Glucerna 1.2 @ 35ml/hr x24 hrs to provide 840ml, 1008 calories, 30/kg, protein 50gm, 1.47gm/kg based on dosing weight 33.8kg.  fluid 820ml  [X ] Other: defer free water     [ ] Other:        Monitoring and Evaluation:     [X ] Tolerance to diet prescription [X ] weights [X ] follow up per protocol    [ ] other:

## 2018-09-28 LAB
ANION GAP SERPL CALC-SCNC: 13 MMOL/L — SIGNIFICANT CHANGE UP (ref 5–17)
BASOPHILS # BLD AUTO: 0.01 K/UL — SIGNIFICANT CHANGE UP (ref 0–0.2)
BASOPHILS NFR BLD AUTO: 0.2 % — SIGNIFICANT CHANGE UP (ref 0–2)
BUN SERPL-MCNC: 45 MG/DL — HIGH (ref 7–23)
CALCIUM SERPL-MCNC: 9.7 MG/DL — SIGNIFICANT CHANGE UP (ref 8.4–10.5)
CHLORIDE SERPL-SCNC: 101 MMOL/L — SIGNIFICANT CHANGE UP (ref 96–108)
CO2 SERPL-SCNC: 22 MMOL/L — SIGNIFICANT CHANGE UP (ref 22–31)
CREAT SERPL-MCNC: 0.66 MG/DL — SIGNIFICANT CHANGE UP (ref 0.5–1.3)
CULTURE RESULTS: SIGNIFICANT CHANGE UP
EOSINOPHIL # BLD AUTO: 0.24 K/UL — SIGNIFICANT CHANGE UP (ref 0–0.5)
EOSINOPHIL NFR BLD AUTO: 5.1 % — SIGNIFICANT CHANGE UP (ref 0–6)
GLUCOSE BLDC GLUCOMTR-MCNC: 114 MG/DL — HIGH (ref 70–99)
GLUCOSE BLDC GLUCOMTR-MCNC: 150 MG/DL — HIGH (ref 70–99)
GLUCOSE BLDC GLUCOMTR-MCNC: 92 MG/DL — SIGNIFICANT CHANGE UP (ref 70–99)
GLUCOSE SERPL-MCNC: 113 MG/DL — HIGH (ref 70–99)
HCT VFR BLD CALC: 17.1 % — CRITICAL LOW (ref 34.5–45)
HCT VFR BLD CALC: 31.2 % — LOW (ref 34.5–45)
HGB BLD-MCNC: 10.5 G/DL — LOW (ref 11.5–15.5)
HGB BLD-MCNC: 5.5 G/DL — CRITICAL LOW (ref 11.5–15.5)
IMM GRANULOCYTES NFR BLD AUTO: 0.2 % — SIGNIFICANT CHANGE UP (ref 0–1.5)
LYMPHOCYTES # BLD AUTO: 0.77 K/UL — LOW (ref 1–3.3)
LYMPHOCYTES # BLD AUTO: 16.3 % — SIGNIFICANT CHANGE UP (ref 13–44)
MCHC RBC-ENTMCNC: 30.7 PG — SIGNIFICANT CHANGE UP (ref 27–34)
MCHC RBC-ENTMCNC: 31.1 PG — SIGNIFICANT CHANGE UP (ref 27–34)
MCHC RBC-ENTMCNC: 32.2 GM/DL — SIGNIFICANT CHANGE UP (ref 32–36)
MCHC RBC-ENTMCNC: 33.7 GM/DL — SIGNIFICANT CHANGE UP (ref 32–36)
MCV RBC AUTO: 92.4 FL — SIGNIFICANT CHANGE UP (ref 80–100)
MCV RBC AUTO: 95.5 FL — SIGNIFICANT CHANGE UP (ref 80–100)
MONOCYTES # BLD AUTO: 0.42 K/UL — SIGNIFICANT CHANGE UP (ref 0–0.9)
MONOCYTES NFR BLD AUTO: 8.9 % — SIGNIFICANT CHANGE UP (ref 2–14)
NEUTROPHILS # BLD AUTO: 3.26 K/UL — SIGNIFICANT CHANGE UP (ref 1.8–7.4)
NEUTROPHILS NFR BLD AUTO: 69.3 % — SIGNIFICANT CHANGE UP (ref 43–77)
PLATELET # BLD AUTO: 230 K/UL — SIGNIFICANT CHANGE UP (ref 150–400)
PLATELET # BLD AUTO: 465 K/UL — HIGH (ref 150–400)
POTASSIUM SERPL-MCNC: 4.2 MMOL/L — SIGNIFICANT CHANGE UP (ref 3.5–5.3)
POTASSIUM SERPL-SCNC: 4.2 MMOL/L — SIGNIFICANT CHANGE UP (ref 3.5–5.3)
RBC # BLD: 1.79 M/UL — LOW (ref 3.8–5.2)
RBC # BLD: 3.37 M/UL — LOW (ref 3.8–5.2)
RBC # FLD: 15.3 % — HIGH (ref 10.3–14.5)
RBC # FLD: 16.9 % — HIGH (ref 10.3–14.5)
SODIUM SERPL-SCNC: 136 MMOL/L — SIGNIFICANT CHANGE UP (ref 135–145)
SPECIMEN SOURCE: SIGNIFICANT CHANGE UP
WBC # BLD: 10.3 K/UL — SIGNIFICANT CHANGE UP (ref 3.8–10.5)
WBC # BLD: 4.71 K/UL — SIGNIFICANT CHANGE UP (ref 3.8–10.5)
WBC # FLD AUTO: 10.3 K/UL — SIGNIFICANT CHANGE UP (ref 3.8–10.5)
WBC # FLD AUTO: 4.71 K/UL — SIGNIFICANT CHANGE UP (ref 3.8–10.5)

## 2018-09-28 PROCEDURE — 99232 SBSQ HOSP IP/OBS MODERATE 35: CPT

## 2018-09-28 RX ORDER — FENTANYL CITRATE 50 UG/ML
1 INJECTION INTRAVENOUS
Qty: 0 | Refills: 0 | Status: DISCONTINUED | OUTPATIENT
Start: 2018-09-28 | End: 2018-10-04

## 2018-09-28 RX ORDER — MORPHINE SULFATE 50 MG/1
1 CAPSULE, EXTENDED RELEASE ORAL EVERY 4 HOURS
Qty: 0 | Refills: 0 | Status: DISCONTINUED | OUTPATIENT
Start: 2018-09-28 | End: 2018-10-01

## 2018-09-28 RX ADMIN — Medication 1 DROP(S): at 21:52

## 2018-09-28 RX ADMIN — MINOCYCLINE HYDROCHLORIDE 100 MILLIGRAM(S): 45 TABLET, EXTENDED RELEASE ORAL at 05:09

## 2018-09-28 RX ADMIN — MORPHINE SULFATE 1 MILLIGRAM(S): 50 CAPSULE, EXTENDED RELEASE ORAL at 04:44

## 2018-09-28 RX ADMIN — MINOCYCLINE HYDROCHLORIDE 100 MILLIGRAM(S): 45 TABLET, EXTENDED RELEASE ORAL at 17:37

## 2018-09-28 RX ADMIN — LIDOCAINE 1 PATCH: 4 CREAM TOPICAL at 10:09

## 2018-09-28 RX ADMIN — Medication 500000 UNIT(S): at 17:37

## 2018-09-28 RX ADMIN — MORPHINE SULFATE 1 MILLIGRAM(S): 50 CAPSULE, EXTENDED RELEASE ORAL at 22:23

## 2018-09-28 RX ADMIN — MUPIROCIN 1 APPLICATION(S): 20 OINTMENT TOPICAL at 17:36

## 2018-09-28 RX ADMIN — PANTOPRAZOLE SODIUM 40 MILLIGRAM(S): 20 TABLET, DELAYED RELEASE ORAL at 12:29

## 2018-09-28 RX ADMIN — ENOXAPARIN SODIUM 40 MILLIGRAM(S): 100 INJECTION SUBCUTANEOUS at 12:28

## 2018-09-28 RX ADMIN — SERTRALINE 50 MILLIGRAM(S): 25 TABLET, FILM COATED ORAL at 12:30

## 2018-09-28 RX ADMIN — HUMAN INSULIN 8 UNIT(S): 100 INJECTION, SUSPENSION SUBCUTANEOUS at 05:51

## 2018-09-28 RX ADMIN — Medication 650 MILLIGRAM(S): at 08:04

## 2018-09-28 RX ADMIN — ERGOCALCIFEROL 8000 UNIT(S): 1.25 CAPSULE ORAL at 12:31

## 2018-09-28 RX ADMIN — Medication 2 MILLIGRAM(S): at 21:53

## 2018-09-28 RX ADMIN — MIRTAZAPINE 15 MILLIGRAM(S): 45 TABLET, ORALLY DISINTEGRATING ORAL at 21:53

## 2018-09-28 RX ADMIN — Medication 1 DROP(S): at 13:16

## 2018-09-28 RX ADMIN — MORPHINE SULFATE 1 MILLIGRAM(S): 50 CAPSULE, EXTENDED RELEASE ORAL at 16:40

## 2018-09-28 RX ADMIN — MUPIROCIN 1 APPLICATION(S): 20 OINTMENT TOPICAL at 05:09

## 2018-09-28 RX ADMIN — Medication 7.5 MILLIGRAM(S): at 05:09

## 2018-09-28 RX ADMIN — MORPHINE SULFATE 1 MILLIGRAM(S): 50 CAPSULE, EXTENDED RELEASE ORAL at 12:45

## 2018-09-28 RX ADMIN — Medication 650 MILLIGRAM(S): at 08:35

## 2018-09-28 RX ADMIN — NYSTATIN CREAM 1 APPLICATION(S): 100000 CREAM TOPICAL at 05:09

## 2018-09-28 RX ADMIN — Medication 300 MILLIGRAM(S): at 12:30

## 2018-09-28 RX ADMIN — FENTANYL CITRATE 1 PATCH: 50 INJECTION INTRAVENOUS at 08:04

## 2018-09-28 RX ADMIN — MORPHINE SULFATE 1 MILLIGRAM(S): 50 CAPSULE, EXTENDED RELEASE ORAL at 12:28

## 2018-09-28 RX ADMIN — Medication 500000 UNIT(S): at 05:10

## 2018-09-28 RX ADMIN — CHLORHEXIDINE GLUCONATE 1 APPLICATION(S): 213 SOLUTION TOPICAL at 05:09

## 2018-09-28 RX ADMIN — Medication 1 DROP(S): at 05:10

## 2018-09-28 RX ADMIN — Medication 500000 UNIT(S): at 12:29

## 2018-09-28 RX ADMIN — FENTANYL CITRATE 1 PATCH: 50 INJECTION INTRAVENOUS at 08:05

## 2018-09-28 RX ADMIN — MORPHINE SULFATE 1 MILLIGRAM(S): 50 CAPSULE, EXTENDED RELEASE ORAL at 16:24

## 2018-09-28 RX ADMIN — MORPHINE SULFATE 1 MILLIGRAM(S): 50 CAPSULE, EXTENDED RELEASE ORAL at 21:53

## 2018-09-28 RX ADMIN — LIDOCAINE 1 PATCH: 4 CREAM TOPICAL at 21:53

## 2018-09-28 RX ADMIN — Medication 0.5 MILLIGRAM(S): at 12:30

## 2018-09-28 NOTE — PROGRESS NOTE ADULT - SUBJECTIVE AND OBJECTIVE BOX
---___---___---___---___---___---___ ---___---___---___---___---___---___---___---___---___---                    <<<  M E D I C A L   A T T E N D I N G    F O L L O W    U P   N O T E  >>>  patient awake alert  to name doing well no distress laying in chair      ---___---___---___---___---___---      <<<  MEDICATIONS:  >>>    MEDICATIONS  (STANDING):  artificial tears (preservative free) Ophthalmic Solution 1 Drop(s) Both EYES three times a day  buDESOnide   0.5 milliGRAM(s) Respule 0.5 milliGRAM(s) Inhalation every 12 hours  chlorhexidine 4% Liquid 1 Application(s) Topical <User Schedule>  clonazePAM Tablet 2 milliGRAM(s) Oral <User Schedule>  dextrose 5%. 1000 milliLiter(s) (50 mL/Hr) IV Continuous <Continuous>  dextrose 50% Injectable 12.5 Gram(s) IV Push once  dextrose 50% Injectable 25 Gram(s) IV Push once  dextrose 50% Injectable 25 Gram(s) IV Push once  enoxaparin Injectable 40 milliGRAM(s) SubCutaneous daily  ergocalciferol Drops 8000 Unit(s) Oral daily  fentaNYL   Patch  12 MICROgram(s)/Hr 1 Patch Transdermal every 72 hours  ferrous    sulfate Liquid 300 milliGRAM(s) Enteral Tube daily  insulin lispro (HumaLOG) corrective regimen sliding scale   SubCutaneous every 6 hours  insulin NPH human recombinant 8 Unit(s) SubCutaneous <User Schedule>  lidocaine   Patch 1 Patch Transdermal every 24 hours  minocycline 100 milliGRAM(s) Oral two times a day  mirtazapine 15 milliGRAM(s) Oral <User Schedule>  mupirocin 2% Ointment 1 Application(s) Topical two times a day  nystatin    Suspension 346417 Unit(s) Oral four times a day  pantoprazole  Injectable 40 milliGRAM(s) IV Push every 24 hours  predniSONE   Tablet 7.5 milliGRAM(s) Oral <User Schedule>  sertraline 50 milliGRAM(s) Oral daily      MEDICATIONS  (PRN):  acetaminophen    Suspension .. 650 milliGRAM(s) Oral every 8 hours PRN Mild Pain (1 - 3)  acetaminophen  IVPB .. 1000 milliGRAM(s) IV Intermittent every 8 hours PRN Mild Pain (1 - 3), Moderate Pain (4 - 6), Severe Pain (7 - 10)  dextrose 40% Gel 15 Gram(s) Oral once PRN Blood Glucose LESS THAN 70 milliGRAM(s)/deciLiter  glucagon  Injectable 1 milliGRAM(s) IntraMuscular once PRN Glucose <70 milliGRAM(s)/deciLiter  morphine  - Injectable 1 milliGRAM(s) IV Push every 4 hours PRN breakthrough pain  nystatin Powder 1 Application(s) Topical two times a day PRN rash/itchiness       ---___---___---___---___---___---     <<<REVIEW OF SYSTEM: >>>    GEN: no fever, no chills, no pain  RESP: no SOB, no cough, no sputum  CVS: no chest pain, no palpitations, no edema  GI: no abdominal pain, no nausea, no vomiting, no constipation, no diarrhea  : no dysurea, no frequency  NEURO: no headache, no dizziness  PSYCH: no depression, not anxious  Derm : no itching, no rash     ---___---___---___---___---___---          <<<  VITAL SIGNS: >>>    T(F): 98.7 (09-28-18 @ 11:59), Max: 98.7 (09-28-18 @ 11:59)  HR: 84 (09-28-18 @ 11:59) (68 - 93)  BP: 130/74 (09-28-18 @ 11:59) (119/75 - 132/84)  RR: 18 (09-28-18 @ 11:59) (18 - 18)  SpO2: 93% (09-28-18 @ 11:59) (93% - 100%)  Wt(kg): --  CAPILLARY BLOOD GLUCOSE      POCT Blood Glucose.: 150 mg/dL (28 Sep 2018 12:24)    I&O's Summary    27 Sep 2018 07:01  -  28 Sep 2018 07:00  --------------------------------------------------------  IN: 1940 mL / OUT: 1250 mL / NET: 690 mL    28 Sep 2018 07:01  -  28 Sep 2018 15:14  --------------------------------------------------------  IN: 0 mL / OUT: 150 mL / NET: -150 mL         ---___---___---___---___---___---                       PHYSICAL EXAM:    GEN: A&O X 1 , NAD , comfortable  HEENT: NCAT, PERRL, MMM, no scleral icterus, hearing intact  NECK: Supple, No JVD  CVS: S1S2 , regular , No M/R/G appreciated  PULM: CTA B/L,  no W/R/R appreciated  ABD.: soft. non tender, non distended,  bowel sounds present peg   Extrem: intact pulses , no edema noted  Derm: No rash or ecchymosis noted  gavin noted   PSYCH: normal mood,  depression,  anxious     ---___---___---___---___---___---     <<<  LAB AND IMAGING: >>>                          10.5   10.3  )-----------( 465      ( 28 Sep 2018 10:37 )             31.2               09-28    136  |  101  |  45<H>  ----------------------------<  113<H>  4.2   |  22  |  0.66    Ca    9.7      28 Sep 2018 06:51                                 [All pertinent / recent available Imaging reports and other labs reviewed]     ---___---___---___---___---___---___ ---___---___---___---___---           <<<  DEO FAUSTIN N T   A N D   P L A N :  >>>          -GI/DVT Prophylaxis.    --------------------------------------------      >>______________________<<      Deniz Bolden .         phone   [phone]

## 2018-09-28 NOTE — PROVIDER CONTACT NOTE (CRITICAL VALUE NOTIFICATION) - ACTION/TREATMENT ORDERED:
Team aware to alter iv antibiotic coverage and re-eval with ID after.
contaminant as cbc came back hemoglobin of 8.7 disregard results
hold vanco dose
Np called and made aware of culture results
MONITOR
NP notified. orders to follow.
None
Pt being w/u to rule out PE. Plans to take for CTA, start on heparin gtt, ECG done
continue to monitor - iron ordered
repeat CBC

## 2018-09-28 NOTE — PROGRESS NOTE ADULT - ASSESSMENT
dementia   dysphonia   left hip fracture   h/o mrsa sepsis   anxiety       dc planning in [facility]

## 2018-09-28 NOTE — PROGRESS NOTE ADULT - ATTENDING COMMENTS
Hypernatremia and prerenal azotemia  -in the setting of volume depletion  -sodium improving, BUn improving  -cont free water via  q6hr  -IVF as needed  -has leslye    Increased BUN: prerenal+steroids+feeds which are high in protein--feeds- nutrition follow up    will sign off  reconsult as needed

## 2018-09-28 NOTE — PROVIDER CONTACT NOTE (CRITICAL VALUE NOTIFICATION) - ASSESSMENT
MONITOR
Pt in no acute distress.
Pt stable no changes to status
Pt tachycardic (130/140s), hypertensive (/160s) complaining of pain/dyspnea
hgb 5.5  hct 17.1
patient a&ox1 resting in bed and denies pain
pt resting comfortably - no S&S of bleeding

## 2018-09-28 NOTE — PROGRESS NOTE ADULT - SUBJECTIVE AND OBJECTIVE BOX
CC: f/u for  mrsa infected hip and bacteremia  Patient reports she is ok and wants her socks pulled up    REVIEW OF SYSTEMS:  All other review of systems negative (Comprehensive ROS)    Antimicrobials Day #  :  minocycline 100 milliGRAM(s) Oral two times a day  nystatin    Suspension 553887 Unit(s) Oral four times a day    Other Medications Reviewed    T(F): 98.7 (09-28-18 @ 11:59), Max: 98.7 (09-28-18 @ 11:59)  HR: 84 (09-28-18 @ 11:59)  BP: 130/74 (09-28-18 @ 11:59)  RR: 18 (09-28-18 @ 11:59)  SpO2: 93% (09-28-18 @ 11:59)  Wt(kg): --    PHYSICAL EXAM:  General: alert, no acute distress  Eyes:  anicteric, no conjunctival injection, no discharge  Oropharynx: no lesions or injection 	  Neck: supple, without adenopathy  Lungs: clear to auscultation  Heart: regular rate and rhythm; no murmur, rubs or gallops  Abdomen: soft, nondistended, nontender, without mass or organomegaly. peg  Skin: no lesions  Extremities: no clubbing, cyanosis,. right hip with vac and some  edema  Neurologic: alert, confused, moves all extremities    LAB RESULTS:                        10.5   10.3  )-----------( 465      ( 28 Sep 2018 10:37 )             31.2     09-28    136  |  101  |  45<H>  ----------------------------<  113<H>  4.2   |  22  |  0.66    Ca    9.7      28 Sep 2018 06:51          MICROBIOLOGY:  RECENT CULTURES:    Culture - Blood (09.22.18 @ 01:09)    Gram Stain:   Growth in aerobic bottle: Gram Positive Rods    Specimen Source: .Blood Blood    Culture Results:   Growth in aerobic bottle: Gram Positive Rods      RADIOLOGY REVIEWED:    < from: [facility] Duplex Lower Ext Vein Scan, Bilat (09.27.18 @ 14:01) >    No evidence of bilateral lower extremity deep venous thrombosis.        < end of copied text >      Assessment:  Patient with mrsa prosthetic hip infection s/p washout then leisa, stormy postop course with dysphagia, respiratory failure, multiple episodes of sepsis. She has had some intermittent low grade temps but no afebrile for several days. Suspect positive blood cx with 2/4 gpr will be contaminant since clinically no sign of infection at present but await ID   Plan:  continue suppressive minocycline  await blood isolate identification

## 2018-09-28 NOTE — PROVIDER CONTACT NOTE (CRITICAL VALUE NOTIFICATION) - RECOMMENDATIONS
NONE
None
cardiology consult, repeat troponins, heparin gtt
monitor H/H daily
per provider
repeat CBC

## 2018-09-28 NOTE — PROGRESS NOTE ADULT - SUBJECTIVE AND OBJECTIVE BOX
[facility] DIVISION OF KIDNEY DISEASES AND HYPERTENSION -- FOLLOW UP NOTE  --------------------------------------------------------------------------------  Chief Complaint:/subjective: daughter states that patient more alert today    24 hour events: no acute events        PAST HISTORY  --------------------------------------------------------------------------------  No significant changes to PMH, PSH, FHx, SHx, unless otherwise noted    ALLERGIES & MEDICATIONS  --------------------------------------------------------------------------------  Allergies    ASA; dye contrast (Anaphylaxis)  aspirin (Short breath)  divalproex sodium (Other (Unknown))  Haldol (Other (Unknown))  penicillin (Short breath; Rash)  sulfa drugs (Short breath; Rash)  Xanax (Other (Unknown))    Intolerances      Standing Inpatient Medications  artificial tears (preservative free) Ophthalmic Solution 1 Drop(s) Both EYES three times a day  buDESOnide   0.5 milliGRAM(s) Respule 0.5 milliGRAM(s) Inhalation every 12 hours  chlorhexidine 4% Liquid 1 Application(s) Topical <User Schedule>  clonazePAM Tablet 2 milliGRAM(s) Oral <User Schedule>  dextrose 5%. 1000 milliLiter(s) IV Continuous <Continuous>  dextrose 50% Injectable 12.5 Gram(s) IV Push once  dextrose 50% Injectable 25 Gram(s) IV Push once  dextrose 50% Injectable 25 Gram(s) IV Push once  enoxaparin Injectable 40 milliGRAM(s) SubCutaneous daily  ergocalciferol Drops 8000 Unit(s) Oral daily  fentaNYL   Patch  12 MICROgram(s)/Hr 1 Patch Transdermal every 72 hours  ferrous    sulfate Liquid 300 milliGRAM(s) Enteral Tube daily  insulin lispro (HumaLOG) corrective regimen sliding scale   SubCutaneous every 6 hours  insulin NPH human recombinant 8 Unit(s) SubCutaneous <User Schedule>  lidocaine   Patch 1 Patch Transdermal every 24 hours  minocycline 100 milliGRAM(s) Oral two times a day  mirtazapine 15 milliGRAM(s) Oral <User Schedule>  mupirocin 2% Ointment 1 Application(s) Topical two times a day  nystatin    Suspension 514965 Unit(s) Oral four times a day  pantoprazole  Injectable 40 milliGRAM(s) IV Push every 24 hours  predniSONE   Tablet 7.5 milliGRAM(s) Oral <User Schedule>  sertraline 50 milliGRAM(s) Oral daily    PRN Inpatient Medications  acetaminophen    Suspension .. 650 milliGRAM(s) Oral every 8 hours PRN  acetaminophen  IVPB .. 1000 milliGRAM(s) IV Intermittent every 8 hours PRN  dextrose 40% Gel 15 Gram(s) Oral once PRN  glucagon  Injectable 1 milliGRAM(s) IntraMuscular once PRN  morphine  - Injectable 1 milliGRAM(s) IV Push every 4 hours PRN  nystatin Powder 1 Application(s) Topical two times a day PRN      REVIEW OF SYSTEMS  --------------------------------------------------------------------------------  Gen: No weight changes, fatigue, fevers/chills, weakness  Skin: No rashes  Head/Eyes/Ears/Mouth: No headache;   Respiratory: No dyspnea, cough  CV: No chest pain, PND, orthopnea  GI: No abdominal pain, diarrhea, constipation, nausea, vomiting  : No increased frequency, dysuria, hematuria, nocturia  MSK: No joint pain/swelling; no back pain; no edema  Neuro: No dizziness/lightheadedness, weakness  Heme: No easy bruising or bleeding  Psych: No significant nervousness, anxiety, stress, depression    All other systems were reviewed and are negative, except as noted.    VITALS/PHYSICAL EXAM  --------------------------------------------------------------------------------  T(C): 37.1 (09-28-18 @ 11:59), Max: 37.1 (09-28-18 @ 11:59)  HR: 84 (09-28-18 @ 11:59) (68 - 93)  BP: 130/74 (09-28-18 @ 11:59) (112/60 - 132/84)  RR: 18 (09-28-18 @ 11:59) (17 - 18)  SpO2: 93% (09-28-18 @ 11:59) (93% - 100%)  Wt(kg): --  Adult Advanced Hemodynamics Last 24 Hrs  ABP: --  ABP(mean): --  CVP(mm Hg): --  CO: --  CI: --  PA: --  PA(mean): --  PCWP: --  SVR: --  SVRI: --        09-27-18 @ 07:01  -  09-28-18 @ 07:00  --------------------------------------------------------  IN: 1940 mL / OUT: 1250 mL / NET: 690 mL      Physical Exam:  	Gen: NAD,    	HEENT:  no jvp  	Pulm: CTA B/L  	CV: RRR, S1S2; no rub  	Back:   no sacral edema  	Abd: +BS, soft, nontender/nondistended  	: +gavin  	Ext: no edema  	Neuro: as above  	Psych: awake  	Skin: Warm,   	Vascular access:    LABS/STUDIES  --------------------------------------------------------------------------------              10.5   10.3  >-----------<  465      [09-28-18 @ 10:37]              31.2     Hemoglobin: 10.5 g/dL (09-28-18 @ 10:37)  Hemoglobin: 5.5 g/dL (09-28-18 @ 08:29)    Platelet Count - Automated: 465 K/uL (09-28-18 @ 10:37)  Platelet Count - Automated: 230 K/uL (09-28-18 @ 08:29)    136  |  101  |  45  ----------------------------<  113      [09-28-18 @ 06:51]  4.2   |  22  |  0.66        Ca     9.7     [09-28-18 @ 06:51]            Creatinine Trend:  SCr 0.66 [09-28 @ 06:51]  SCr 0.70 [09-27 @ 16:38]  SCr 0.72 [09-26 @ 06:45]  SCr 0.72 [09-25 @ 07:03]  SCr 0.71 [09-24 @ 07:30]    Urinalysis - [09-19-18 @ 08:11]      Color Yellow / Appearance Slightly Turbid / SG 1.018 / pH 6.0      Gluc Negative / Ketone Negative  / Bili Negative / Urobili Negative       Blood Moderate / Protein 100 / Leuk Est Small / Nitrite Negative      RBC 73 / WBC 26 / Hyaline 3 / Gran 5 / Sq Epi  / Non Sq Epi 10 / Bacteria Few    Urine Sodium 99      [09-23-18 @ 20:30]  Urine Osmolality 617      [09-23-18 @ 22:48]    Iron 14, TIBC 85, %sat 16      [08-14-18 @ 05:07]  PTH -- (Ca 8.6)      [09-11-18 @ 09:14]   52  Vitamin D (25OH) 18.9      [09-11-18 @ 09:04]  HbA1c 5.5      [07-27-18 @ 09:02]  TSH 0.95      [07-27-18 @ 09:02]

## 2018-09-28 NOTE — PROVIDER CONTACT NOTE (CRITICAL VALUE NOTIFICATION) - SITUATION
MRSA growth in aerobic and anaerobic bottles
+ Blood cultures reported
+ blood cultures anaerobic bottle gram + cocci in clusters
68F s/p R hip hemiarthroplasty in June with collection s/p I&D/revision/pus evacuation
BLOOD CULTURE  AEROBIC BOTTLE +VE COCCI&CLUSTER
Positive blood cultures
blood culture growth in aerobic bottle gram positive rods
h/h 4.6/15 on abg
hgb 5.5  hct 17.1
pt with am labs - HCt 20.6
vanco level 30.2

## 2018-09-29 DIAGNOSIS — R13.19 OTHER DYSPHAGIA: ICD-10-CM

## 2018-09-29 LAB
ANION GAP SERPL CALC-SCNC: 15 MMOL/L — SIGNIFICANT CHANGE UP (ref 5–17)
BASOPHILS # BLD AUTO: 0.03 K/UL — SIGNIFICANT CHANGE UP (ref 0–0.2)
BASOPHILS NFR BLD AUTO: 0.3 % — SIGNIFICANT CHANGE UP (ref 0–2)
BUN SERPL-MCNC: 42 MG/DL — HIGH (ref 7–23)
CALCIUM SERPL-MCNC: 10.1 MG/DL — SIGNIFICANT CHANGE UP (ref 8.4–10.5)
CHLORIDE SERPL-SCNC: 97 MMOL/L — SIGNIFICANT CHANGE UP (ref 96–108)
CO2 SERPL-SCNC: 23 MMOL/L — SIGNIFICANT CHANGE UP (ref 22–31)
CREAT SERPL-MCNC: 0.66 MG/DL — SIGNIFICANT CHANGE UP (ref 0.5–1.3)
CULTURE RESULTS: SIGNIFICANT CHANGE UP
EOSINOPHIL # BLD AUTO: 0.34 K/UL — SIGNIFICANT CHANGE UP (ref 0–0.5)
EOSINOPHIL NFR BLD AUTO: 3.7 % — SIGNIFICANT CHANGE UP (ref 0–6)
GLUCOSE BLDC GLUCOMTR-MCNC: 105 MG/DL — HIGH (ref 70–99)
GLUCOSE BLDC GLUCOMTR-MCNC: 106 MG/DL — HIGH (ref 70–99)
GLUCOSE BLDC GLUCOMTR-MCNC: 169 MG/DL — HIGH (ref 70–99)
GLUCOSE BLDC GLUCOMTR-MCNC: 58 MG/DL — LOW (ref 70–99)
GLUCOSE BLDC GLUCOMTR-MCNC: 84 MG/DL — SIGNIFICANT CHANGE UP (ref 70–99)
GLUCOSE BLDC GLUCOMTR-MCNC: 88 MG/DL — SIGNIFICANT CHANGE UP (ref 70–99)
GLUCOSE SERPL-MCNC: 121 MG/DL — HIGH (ref 70–99)
HCT VFR BLD CALC: 31.8 % — LOW (ref 34.5–45)
HGB BLD-MCNC: 10 G/DL — LOW (ref 11.5–15.5)
IMM GRANULOCYTES NFR BLD AUTO: 0.3 % — SIGNIFICANT CHANGE UP (ref 0–1.5)
LYMPHOCYTES # BLD AUTO: 1.48 K/UL — SIGNIFICANT CHANGE UP (ref 1–3.3)
LYMPHOCYTES # BLD AUTO: 16.2 % — SIGNIFICANT CHANGE UP (ref 13–44)
MCHC RBC-ENTMCNC: 30.3 PG — SIGNIFICANT CHANGE UP (ref 27–34)
MCHC RBC-ENTMCNC: 31.4 GM/DL — LOW (ref 32–36)
MCV RBC AUTO: 96.4 FL — SIGNIFICANT CHANGE UP (ref 80–100)
MONOCYTES # BLD AUTO: 0.95 K/UL — HIGH (ref 0–0.9)
MONOCYTES NFR BLD AUTO: 10.4 % — SIGNIFICANT CHANGE UP (ref 2–14)
NEUTROPHILS # BLD AUTO: 6.33 K/UL — SIGNIFICANT CHANGE UP (ref 1.8–7.4)
NEUTROPHILS NFR BLD AUTO: 69.1 % — SIGNIFICANT CHANGE UP (ref 43–77)
PLATELET # BLD AUTO: 485 K/UL — HIGH (ref 150–400)
POTASSIUM SERPL-MCNC: 4.3 MMOL/L — SIGNIFICANT CHANGE UP (ref 3.5–5.3)
POTASSIUM SERPL-SCNC: 4.3 MMOL/L — SIGNIFICANT CHANGE UP (ref 3.5–5.3)
RBC # BLD: 3.3 M/UL — LOW (ref 3.8–5.2)
RBC # FLD: 16.8 % — HIGH (ref 10.3–14.5)
SODIUM SERPL-SCNC: 135 MMOL/L — SIGNIFICANT CHANGE UP (ref 135–145)
SPECIMEN SOURCE: SIGNIFICANT CHANGE UP
WBC # BLD: 9.16 K/UL — SIGNIFICANT CHANGE UP (ref 3.8–10.5)
WBC # FLD AUTO: 9.16 K/UL — SIGNIFICANT CHANGE UP (ref 3.8–10.5)

## 2018-09-29 RX ORDER — MORPHINE SULFATE 50 MG/1
1 CAPSULE, EXTENDED RELEASE ORAL ONCE
Qty: 0 | Refills: 0 | Status: DISCONTINUED | OUTPATIENT
Start: 2018-09-29 | End: 2018-09-29

## 2018-09-29 RX ADMIN — Medication 1 DROP(S): at 22:03

## 2018-09-29 RX ADMIN — MINOCYCLINE HYDROCHLORIDE 100 MILLIGRAM(S): 45 TABLET, EXTENDED RELEASE ORAL at 05:13

## 2018-09-29 RX ADMIN — Medication 500000 UNIT(S): at 00:46

## 2018-09-29 RX ADMIN — Medication 500000 UNIT(S): at 12:18

## 2018-09-29 RX ADMIN — Medication 0.5 MILLIGRAM(S): at 23:03

## 2018-09-29 RX ADMIN — Medication 1: at 13:22

## 2018-09-29 RX ADMIN — ENOXAPARIN SODIUM 40 MILLIGRAM(S): 100 INJECTION SUBCUTANEOUS at 12:18

## 2018-09-29 RX ADMIN — MORPHINE SULFATE 1 MILLIGRAM(S): 50 CAPSULE, EXTENDED RELEASE ORAL at 22:30

## 2018-09-29 RX ADMIN — MORPHINE SULFATE 1 MILLIGRAM(S): 50 CAPSULE, EXTENDED RELEASE ORAL at 13:45

## 2018-09-29 RX ADMIN — PANTOPRAZOLE SODIUM 40 MILLIGRAM(S): 20 TABLET, DELAYED RELEASE ORAL at 12:19

## 2018-09-29 RX ADMIN — Medication 2 MILLIGRAM(S): at 22:03

## 2018-09-29 RX ADMIN — Medication 0.5 MILLIGRAM(S): at 00:46

## 2018-09-29 RX ADMIN — MORPHINE SULFATE 1 MILLIGRAM(S): 50 CAPSULE, EXTENDED RELEASE ORAL at 15:31

## 2018-09-29 RX ADMIN — Medication 1 DROP(S): at 14:54

## 2018-09-29 RX ADMIN — Medication 7.5 MILLIGRAM(S): at 05:14

## 2018-09-29 RX ADMIN — MORPHINE SULFATE 1 MILLIGRAM(S): 50 CAPSULE, EXTENDED RELEASE ORAL at 05:50

## 2018-09-29 RX ADMIN — Medication 300 MILLIGRAM(S): at 12:18

## 2018-09-29 RX ADMIN — MUPIROCIN 1 APPLICATION(S): 20 OINTMENT TOPICAL at 05:13

## 2018-09-29 RX ADMIN — LIDOCAINE 1 PATCH: 4 CREAM TOPICAL at 22:04

## 2018-09-29 RX ADMIN — Medication 1 DROP(S): at 05:12

## 2018-09-29 RX ADMIN — Medication 500000 UNIT(S): at 23:03

## 2018-09-29 RX ADMIN — MORPHINE SULFATE 1 MILLIGRAM(S): 50 CAPSULE, EXTENDED RELEASE ORAL at 15:14

## 2018-09-29 RX ADMIN — MINOCYCLINE HYDROCHLORIDE 100 MILLIGRAM(S): 45 TABLET, EXTENDED RELEASE ORAL at 18:13

## 2018-09-29 RX ADMIN — Medication 0.5 MILLIGRAM(S): at 12:19

## 2018-09-29 RX ADMIN — CHLORHEXIDINE GLUCONATE 1 APPLICATION(S): 213 SOLUTION TOPICAL at 05:11

## 2018-09-29 RX ADMIN — ERGOCALCIFEROL 8000 UNIT(S): 1.25 CAPSULE ORAL at 12:18

## 2018-09-29 RX ADMIN — MORPHINE SULFATE 1 MILLIGRAM(S): 50 CAPSULE, EXTENDED RELEASE ORAL at 05:10

## 2018-09-29 RX ADMIN — Medication 500000 UNIT(S): at 05:14

## 2018-09-29 RX ADMIN — LIDOCAINE 1 PATCH: 4 CREAM TOPICAL at 09:33

## 2018-09-29 RX ADMIN — Medication 500000 UNIT(S): at 18:13

## 2018-09-29 RX ADMIN — SERTRALINE 50 MILLIGRAM(S): 25 TABLET, FILM COATED ORAL at 12:18

## 2018-09-29 RX ADMIN — MORPHINE SULFATE 1 MILLIGRAM(S): 50 CAPSULE, EXTENDED RELEASE ORAL at 21:58

## 2018-09-29 RX ADMIN — HUMAN INSULIN 8 UNIT(S): 100 INJECTION, SUSPENSION SUBCUTANEOUS at 06:44

## 2018-09-29 RX ADMIN — MUPIROCIN 1 APPLICATION(S): 20 OINTMENT TOPICAL at 18:13

## 2018-09-29 RX ADMIN — MIRTAZAPINE 15 MILLIGRAM(S): 45 TABLET, ORALLY DISINTEGRATING ORAL at 22:04

## 2018-09-29 RX ADMIN — MORPHINE SULFATE 1 MILLIGRAM(S): 50 CAPSULE, EXTENDED RELEASE ORAL at 14:05

## 2018-09-29 NOTE — PROGRESS NOTE ADULT - SUBJECTIVE AND OBJECTIVE BOX
---___---___---___---___---___---___ ---___---___---___---___---___---___---___---___---___---                    <<<  M E D I C A L   A T T E N D I N G    F O L L O W    U P   N O T E  >>>    patient unchanged and wound on right leg improving     ---___---___---___---___---___---      <<<  MEDICATIONS:  >>>    MEDICATIONS  (STANDING):  artificial tears (preservative free) Ophthalmic Solution 1 Drop(s) Both EYES three times a day  buDESOnide   0.5 milliGRAM(s) Respule 0.5 milliGRAM(s) Inhalation every 12 hours  chlorhexidine 4% Liquid 1 Application(s) Topical <User Schedule>  clonazePAM Tablet 2 milliGRAM(s) Oral <User Schedule>  dextrose 5%. 1000 milliLiter(s) (50 mL/Hr) IV Continuous <Continuous>  dextrose 50% Injectable 12.5 Gram(s) IV Push once  dextrose 50% Injectable 25 Gram(s) IV Push once  dextrose 50% Injectable 25 Gram(s) IV Push once  enoxaparin Injectable 40 milliGRAM(s) SubCutaneous daily  ergocalciferol Drops 8000 Unit(s) Oral daily  fentaNYL   Patch  12 MICROgram(s)/Hr 1 Patch Transdermal every 72 hours  ferrous    sulfate Liquid 300 milliGRAM(s) Enteral Tube daily  insulin lispro (HumaLOG) corrective regimen sliding scale   SubCutaneous every 6 hours  insulin NPH human recombinant 8 Unit(s) SubCutaneous <User Schedule>  lidocaine   Patch 1 Patch Transdermal every 24 hours  minocycline 100 milliGRAM(s) Oral two times a day  mirtazapine 15 milliGRAM(s) Oral <User Schedule>  mupirocin 2% Ointment 1 Application(s) Topical two times a day  nystatin    Suspension 498926 Unit(s) Oral four times a day  pantoprazole  Injectable 40 milliGRAM(s) IV Push every 24 hours  predniSONE   Tablet 7.5 milliGRAM(s) Oral <User Schedule>  sertraline 50 milliGRAM(s) Oral daily      MEDICATIONS  (PRN):  acetaminophen    Suspension .. 650 milliGRAM(s) Oral every 8 hours PRN Mild Pain (1 - 3)  acetaminophen  IVPB .. 1000 milliGRAM(s) IV Intermittent every 8 hours PRN Mild Pain (1 - 3), Moderate Pain (4 - 6), Severe Pain (7 - 10)  dextrose 40% Gel 15 Gram(s) Oral once PRN Blood Glucose LESS THAN 70 milliGRAM(s)/deciLiter  glucagon  Injectable 1 milliGRAM(s) IntraMuscular once PRN Glucose <70 milliGRAM(s)/deciLiter  morphine  - Injectable 1 milliGRAM(s) IV Push every 4 hours PRN breakthrough pain  nystatin Powder 1 Application(s) Topical two times a day PRN rash/itchiness       ---___---___---___---___---___---     <<<REVIEW OF SYSTEM: >>>        ---___---___---___---___---___---          <<<  VITAL SIGNS: >>>    T(F): 98.1 (09-29-18 @ 12:03), Max: 98.4 (09-29-18 @ 00:38)  HR: 82 (09-29-18 @ 12:03) (80 - 84)  BP: 100/70 (09-29-18 @ 12:03) (100/70 - 149/79)  RR: 18 (09-29-18 @ 12:03) (18 - 18)  SpO2: 98% (09-29-18 @ 12:03) (97% - 100%)  Wt(kg): --  CAPILLARY BLOOD GLUCOSE      POCT Blood Glucose.: 105 mg/dL (29 Sep 2018 17:36)    I&O's Summary    28 Sep 2018 07:01  -  29 Sep 2018 07:00  --------------------------------------------------------  IN: 1775 mL / OUT: 550 mL / NET: 1225 mL    29 Sep 2018 07:01  -  29 Sep 2018 19:15  --------------------------------------------------------  IN: 530 mL / OUT: 500 mL / NET: 30 mL         ---___---___---___---___---___---                       PHYSICAL EXAM:    GEN: A&O X 1 , NAD , comfortable  HEENT: NCAT, PERRL, MMM, no scleral icterus, hearing intact  NECK: Supple, No JVD  CVS: S1S2 , regular , No M/R/G appreciated  PULM: CTA B/L,  no W/R/R appreciated  ABD.: soft. non tender, non distended,  bowel sounds present peg noted   Extrem: intact pulses , no edema noted  Derm: No rash or ecchymosis noted wound vac right leg   gvain noted   PSYCH: normal mood, no depression, not anxious     ---___---___---___---___---___---     <<<  LAB AND IMAGING: >>>                          10.0   9.16  )-----------( 485      ( 29 Sep 2018 08:23 )             31.8               09-29    135  |  97  |  42<H>  ----------------------------<  121<H>  4.3   |  23  |  0.66    Ca    10.1      29 Sep 2018 07:19                                 [All pertinent / recent available Imaging reports and other labs reviewed]     ---___---___---___---___---___---___ ---___---___---___---___---           <<<  A S S E S S M E N T   A N D   P L A N :  >>>          -GI/DVT Prophylaxis.    --------------------------------------------      >>______________________<<      Deniz Bolden .         phone   [phone]

## 2018-09-29 NOTE — PROGRESS NOTE ADULT - ASSESSMENT
left acetabular fracture   anxiety   hypernatremia   dysphonia   copd  hyperglycemia from long term chronic steroids

## 2018-09-30 DIAGNOSIS — S32.409D: ICD-10-CM

## 2018-09-30 DIAGNOSIS — A41.9 SEPSIS, UNSPECIFIED ORGANISM: ICD-10-CM

## 2018-09-30 LAB
BASOPHILS # BLD AUTO: 0.02 K/UL — SIGNIFICANT CHANGE UP (ref 0–0.2)
BASOPHILS NFR BLD AUTO: 0.3 % — SIGNIFICANT CHANGE UP (ref 0–2)
EOSINOPHIL # BLD AUTO: 0.24 K/UL — SIGNIFICANT CHANGE UP (ref 0–0.5)
EOSINOPHIL NFR BLD AUTO: 3.2 % — SIGNIFICANT CHANGE UP (ref 0–6)
GLUCOSE BLDC GLUCOMTR-MCNC: 103 MG/DL — HIGH (ref 70–99)
GLUCOSE BLDC GLUCOMTR-MCNC: 108 MG/DL — HIGH (ref 70–99)
GLUCOSE BLDC GLUCOMTR-MCNC: 139 MG/DL — HIGH (ref 70–99)
GLUCOSE BLDC GLUCOMTR-MCNC: 147 MG/DL — HIGH (ref 70–99)
HCT VFR BLD CALC: 26.9 % — LOW (ref 34.5–45)
HGB BLD-MCNC: 8.9 G/DL — LOW (ref 11.5–15.5)
IMM GRANULOCYTES NFR BLD AUTO: 0.3 % — SIGNIFICANT CHANGE UP (ref 0–1.5)
LYMPHOCYTES # BLD AUTO: 1.63 K/UL — SIGNIFICANT CHANGE UP (ref 1–3.3)
LYMPHOCYTES # BLD AUTO: 21.9 % — SIGNIFICANT CHANGE UP (ref 13–44)
MCHC RBC-ENTMCNC: 30.6 PG — SIGNIFICANT CHANGE UP (ref 27–34)
MCHC RBC-ENTMCNC: 33.1 GM/DL — SIGNIFICANT CHANGE UP (ref 32–36)
MCV RBC AUTO: 92.4 FL — SIGNIFICANT CHANGE UP (ref 80–100)
MONOCYTES # BLD AUTO: 0.09 K/UL — SIGNIFICANT CHANGE UP (ref 0–0.9)
MONOCYTES NFR BLD AUTO: 1.2 % — LOW (ref 2–14)
NEUTROPHILS # BLD AUTO: 5.45 K/UL — SIGNIFICANT CHANGE UP (ref 1.8–7.4)
NEUTROPHILS NFR BLD AUTO: 73.1 % — SIGNIFICANT CHANGE UP (ref 43–77)
PLATELET # BLD AUTO: 333 K/UL — SIGNIFICANT CHANGE UP (ref 150–400)
RBC # BLD: 2.91 M/UL — LOW (ref 3.8–5.2)
RBC # FLD: 16.6 % — HIGH (ref 10.3–14.5)
WBC # BLD: 7.45 K/UL — SIGNIFICANT CHANGE UP (ref 3.8–10.5)
WBC # FLD AUTO: 7.45 K/UL — SIGNIFICANT CHANGE UP (ref 3.8–10.5)

## 2018-09-30 RX ADMIN — Medication 500000 UNIT(S): at 17:00

## 2018-09-30 RX ADMIN — MUPIROCIN 1 APPLICATION(S): 20 OINTMENT TOPICAL at 17:00

## 2018-09-30 RX ADMIN — Medication 1 DROP(S): at 14:48

## 2018-09-30 RX ADMIN — MINOCYCLINE HYDROCHLORIDE 100 MILLIGRAM(S): 45 TABLET, EXTENDED RELEASE ORAL at 17:00

## 2018-09-30 RX ADMIN — MORPHINE SULFATE 1 MILLIGRAM(S): 50 CAPSULE, EXTENDED RELEASE ORAL at 21:24

## 2018-09-30 RX ADMIN — HUMAN INSULIN 8 UNIT(S): 100 INJECTION, SUSPENSION SUBCUTANEOUS at 06:29

## 2018-09-30 RX ADMIN — Medication 1 DROP(S): at 05:04

## 2018-09-30 RX ADMIN — MORPHINE SULFATE 1 MILLIGRAM(S): 50 CAPSULE, EXTENDED RELEASE ORAL at 02:36

## 2018-09-30 RX ADMIN — Medication 7.5 MILLIGRAM(S): at 05:05

## 2018-09-30 RX ADMIN — SERTRALINE 50 MILLIGRAM(S): 25 TABLET, FILM COATED ORAL at 14:47

## 2018-09-30 RX ADMIN — PANTOPRAZOLE SODIUM 40 MILLIGRAM(S): 20 TABLET, DELAYED RELEASE ORAL at 14:47

## 2018-09-30 RX ADMIN — LIDOCAINE 1 PATCH: 4 CREAM TOPICAL at 21:53

## 2018-09-30 RX ADMIN — MIRTAZAPINE 15 MILLIGRAM(S): 45 TABLET, ORALLY DISINTEGRATING ORAL at 21:54

## 2018-09-30 RX ADMIN — Medication 500000 UNIT(S): at 05:04

## 2018-09-30 RX ADMIN — Medication 1 DROP(S): at 21:54

## 2018-09-30 RX ADMIN — MINOCYCLINE HYDROCHLORIDE 100 MILLIGRAM(S): 45 TABLET, EXTENDED RELEASE ORAL at 05:05

## 2018-09-30 RX ADMIN — Medication 500000 UNIT(S): at 14:47

## 2018-09-30 RX ADMIN — CHLORHEXIDINE GLUCONATE 1 APPLICATION(S): 213 SOLUTION TOPICAL at 05:05

## 2018-09-30 RX ADMIN — MUPIROCIN 1 APPLICATION(S): 20 OINTMENT TOPICAL at 05:05

## 2018-09-30 RX ADMIN — ERGOCALCIFEROL 8000 UNIT(S): 1.25 CAPSULE ORAL at 14:48

## 2018-09-30 RX ADMIN — Medication 500000 UNIT(S): at 21:53

## 2018-09-30 RX ADMIN — MORPHINE SULFATE 1 MILLIGRAM(S): 50 CAPSULE, EXTENDED RELEASE ORAL at 17:58

## 2018-09-30 RX ADMIN — ENOXAPARIN SODIUM 40 MILLIGRAM(S): 100 INJECTION SUBCUTANEOUS at 14:48

## 2018-09-30 RX ADMIN — MORPHINE SULFATE 1 MILLIGRAM(S): 50 CAPSULE, EXTENDED RELEASE ORAL at 16:54

## 2018-09-30 RX ADMIN — Medication 300 MILLIGRAM(S): at 14:47

## 2018-09-30 RX ADMIN — Medication 2 MILLIGRAM(S): at 21:53

## 2018-09-30 RX ADMIN — MORPHINE SULFATE 1 MILLIGRAM(S): 50 CAPSULE, EXTENDED RELEASE ORAL at 02:06

## 2018-09-30 RX ADMIN — LIDOCAINE 1 PATCH: 4 CREAM TOPICAL at 14:44

## 2018-09-30 RX ADMIN — MORPHINE SULFATE 1 MILLIGRAM(S): 50 CAPSULE, EXTENDED RELEASE ORAL at 22:00

## 2018-09-30 NOTE — PROGRESS NOTE ADULT - PROBLEM SELECTOR PROBLEM 6
Sepsis due to methicillin susceptible Staphylococcus aureus
Acute hypernatremia
Sepsis due to methicillin resistant Staphylococcus aureus (MRSA)
Anxiety
Hip fracture, left, sequela
Sepsis due to methicillin susceptible Staphylococcus aureus
Sepsis due to methicillin susceptible Staphylococcus aureus
Sepsis, due to unspecified organism
Wound dehiscence

## 2018-09-30 NOTE — PROGRESS NOTE ADULT - PROBLEM SELECTOR PROBLEM 5
Anxiety
Wound dehiscence
Pelvic fracture
Sepsis due to methicillin susceptible Staphylococcus aureus
Wound dehiscence
Wound dehiscence
Acetabular fracture
Anxiety
Dysphonia
Fever, unspecified fever cause
Hip fracture, left, sequela
Hip fracture, left, sequela
Sepsis due to methicillin susceptible Staphylococcus aureus
Sepsis due to methicillin susceptible Staphylococcus aureus
Wound dehiscence
Wound dehiscence

## 2018-09-30 NOTE — PROGRESS NOTE ADULT - PROBLEM SELECTOR PLAN 6
as above   continue iv abx
add free water to the peg to rehydrate and monitor electrolytes   consider renal consult if necessary
id on board   and following   conservative treatment   completed abx course
as above   has been stable continue pt
completed course
continue vanco   id to add meds since has fever
id following
psychiatry referral appreciated
pt doing wound care

## 2018-09-30 NOTE — PROGRESS NOTE ADULT - ASSESSMENT
wound dehiscence   anxiety   dementia  mrsa sepsis  hyprnatremia  copd   drug induced hyperglycemia   dysphonia     Dc planning in progress

## 2018-09-30 NOTE — PROGRESS NOTE ADULT - PROBLEM SELECTOR PLAN 5
continue   zoloft , remeron and seroquel and klonopin
healing continue current care
continue vancomycin
ortho following   still with acetabular fracture   will give prn dose of percoet 5mg for pain
pt to follow and on wound vac
wound care per pt on wound vac
as above
as above
chronic pain and pain medication  for relief
continue current meds
continue meds as per psychiatry
continue meds as per psychitry will see adjustment will work
iv course completed
on chronic pain relief and prn  doses
psychiatry on board   appreciate psychiatry input
pt for wound care
pt to change and doing wel
supportive care and pt   oob when possible
supportiver care   no abx as of yet

## 2018-09-30 NOTE — PROGRESS NOTE ADULT - SUBJECTIVE AND OBJECTIVE BOX
---___---___---___---___---___---___ ---___---___---___---___---___---___---___---___---___---                    <<<  M E D I C A L   A T T E N D I N G    F O L L O W    U P   N O T E  >>>    remains unchanged wound healing , still in pain      ---___---___---___---___---___---      <<<  MEDICATIONS:  >>>    MEDICATIONS  (STANDING):  artificial tears (preservative free) Ophthalmic Solution 1 Drop(s) Both EYES three times a day  buDESOnide   0.5 milliGRAM(s) Respule 0.5 milliGRAM(s) Inhalation every 12 hours  chlorhexidine 4% Liquid 1 Application(s) Topical <User Schedule>  clonazePAM Tablet 2 milliGRAM(s) Oral <User Schedule>  dextrose 5%. 1000 milliLiter(s) (50 mL/Hr) IV Continuous <Continuous>  dextrose 50% Injectable 12.5 Gram(s) IV Push once  dextrose 50% Injectable 25 Gram(s) IV Push once  dextrose 50% Injectable 25 Gram(s) IV Push once  enoxaparin Injectable 40 milliGRAM(s) SubCutaneous daily  ergocalciferol Drops 8000 Unit(s) Oral daily  fentaNYL   Patch  12 MICROgram(s)/Hr 1 Patch Transdermal every 72 hours  ferrous    sulfate Liquid 300 milliGRAM(s) Enteral Tube daily  insulin lispro (HumaLOG) corrective regimen sliding scale   SubCutaneous every 6 hours  insulin NPH human recombinant 8 Unit(s) SubCutaneous <User Schedule>  lidocaine   Patch 1 Patch Transdermal every 24 hours  minocycline 100 milliGRAM(s) Oral two times a day  mirtazapine 15 milliGRAM(s) Oral <User Schedule>  mupirocin 2% Ointment 1 Application(s) Topical two times a day  nystatin    Suspension 554002 Unit(s) Oral four times a day  pantoprazole  Injectable 40 milliGRAM(s) IV Push every 24 hours  predniSONE   Tablet 7.5 milliGRAM(s) Oral <User Schedule>  sertraline 50 milliGRAM(s) Oral daily      MEDICATIONS  (PRN):  acetaminophen    Suspension .. 650 milliGRAM(s) Oral every 8 hours PRN Mild Pain (1 - 3)  acetaminophen  IVPB .. 1000 milliGRAM(s) IV Intermittent every 8 hours PRN Mild Pain (1 - 3), Moderate Pain (4 - 6), Severe Pain (7 - 10)  dextrose 40% Gel 15 Gram(s) Oral once PRN Blood Glucose LESS THAN 70 milliGRAM(s)/deciLiter  glucagon  Injectable 1 milliGRAM(s) IntraMuscular once PRN Glucose <70 milliGRAM(s)/deciLiter  morphine  - Injectable 1 milliGRAM(s) IV Push every 4 hours PRN breakthrough pain  nystatin Powder 1 Application(s) Topical two times a day PRN rash/itchiness       ---___---___---___---___---___---     <<<REVIEW OF SYSTEM: >>>         ---___---___---___---___---___---          <<<  VITAL SIGNS: >>>    T(F): 98.8 (09-30-18 @ 10:34), Max: 98.8 (09-30-18 @ 10:34)  HR: 84 (09-30-18 @ 10:34) (84 - 98)  BP: 120/70 (09-30-18 @ 10:34) (120/70 - 134/83)  RR: 18 (09-30-18 @ 10:34) (18 - 18)  SpO2: 100% (09-30-18 @ 10:34) (97% - 100%)  Wt(kg): --  CAPILLARY BLOOD GLUCOSE      POCT Blood Glucose.: 147 mg/dL (30 Sep 2018 12:24)    I&O's Summary    29 Sep 2018 07:01  -  30 Sep 2018 07:00  --------------------------------------------------------  IN: 1510 mL / OUT: 1050 mL / NET: 460 mL    30 Sep 2018 07:01  -  30 Sep 2018 14:08  --------------------------------------------------------  IN: 175 mL / OUT: 250 mL / NET: -75 mL         ---___---___---___---___---___---                       PHYSICAL EXAM:    GEN: A&O X 3 , NAD , comfortable  HEENT: NCAT, PERRL, MMM, no scleral icterus, hearing intact  NECK: Supple, No JVD  CVS: S1S2 , regular , No M/R/G appreciated  PULM: CTA B/L,  no W/R/R appreciated  ABD.: soft. non tender, non distended,  bowel sounds present peg noted   Extrem: intact pulses , no edema noted wound healing right leg   Derm: No rash or ecchymosis noted  PSYCH: normal mood, no depression, not anxious     ---___---___---___---___---___---     <<<  LAB AND IMAGING: >>>                          8.9    7.45  )-----------( 333      ( 30 Sep 2018 06:47 )             26.9               09-29    135  |  97  |  42<H>  ----------------------------<  121<H>  4.3   |  23  |  0.66    Ca    10.1      29 Sep 2018 07:19                                 [All pertinent / recent available Imaging reports and other labs reviewed]     ---___---___---___---___---___---___ ---___---___---___---___---           <<<  A S S E S S M E N T   A N D   P L A N :  >>>          -GI/DVT Prophylaxis.    --------------------------------------------     >>______________________<<      Deniz Bolden .         phone   [phone]

## 2018-10-01 LAB
GLUCOSE BLDC GLUCOMTR-MCNC: 120 MG/DL — HIGH (ref 70–99)
GLUCOSE BLDC GLUCOMTR-MCNC: 124 MG/DL — HIGH (ref 70–99)
GLUCOSE BLDC GLUCOMTR-MCNC: 73 MG/DL — SIGNIFICANT CHANGE UP (ref 70–99)
GLUCOSE BLDC GLUCOMTR-MCNC: 86 MG/DL — SIGNIFICANT CHANGE UP (ref 70–99)
GLUCOSE BLDC GLUCOMTR-MCNC: 96 MG/DL — SIGNIFICANT CHANGE UP (ref 70–99)
GLUCOSE BLDC GLUCOMTR-MCNC: 98 MG/DL — SIGNIFICANT CHANGE UP (ref 70–99)
HCT VFR BLD CALC: 28.8 % — LOW (ref 34.5–45)
HGB BLD-MCNC: 9.6 G/DL — LOW (ref 11.5–15.5)
MCHC RBC-ENTMCNC: 30.8 PG — SIGNIFICANT CHANGE UP (ref 27–34)
MCHC RBC-ENTMCNC: 33.6 GM/DL — SIGNIFICANT CHANGE UP (ref 32–36)
MCV RBC AUTO: 91.8 FL — SIGNIFICANT CHANGE UP (ref 80–100)
PLATELET # BLD AUTO: 382 K/UL — SIGNIFICANT CHANGE UP (ref 150–400)
RBC # BLD: 3.13 M/UL — LOW (ref 3.8–5.2)
RBC # FLD: 15.3 % — HIGH (ref 10.3–14.5)
WBC # BLD: 10.6 K/UL — HIGH (ref 3.8–10.5)
WBC # FLD AUTO: 10.6 K/UL — HIGH (ref 3.8–10.5)

## 2018-10-01 PROCEDURE — 93010 ELECTROCARDIOGRAM REPORT: CPT

## 2018-10-01 RX ORDER — MORPHINE SULFATE 50 MG/1
2 CAPSULE, EXTENDED RELEASE ORAL EVERY 4 HOURS
Qty: 0 | Refills: 0 | Status: DISCONTINUED | OUTPATIENT
Start: 2018-10-01 | End: 2018-10-02

## 2018-10-01 RX ORDER — CLONAZEPAM 1 MG
2 TABLET ORAL
Qty: 0 | Refills: 0 | Status: DISCONTINUED | OUTPATIENT
Start: 2018-10-01 | End: 2018-10-02

## 2018-10-01 RX ORDER — MORPHINE SULFATE 50 MG/1
2 CAPSULE, EXTENDED RELEASE ORAL ONCE
Qty: 0 | Refills: 0 | Status: DISCONTINUED | OUTPATIENT
Start: 2018-10-01 | End: 2018-10-01

## 2018-10-01 RX ORDER — SERTRALINE 25 MG/1
50 TABLET, FILM COATED ORAL DAILY
Qty: 0 | Refills: 0 | Status: DISCONTINUED | OUTPATIENT
Start: 2018-10-01 | End: 2018-10-04

## 2018-10-01 RX ORDER — MIRTAZAPINE 45 MG/1
7.5 TABLET, ORALLY DISINTEGRATING ORAL AT BEDTIME
Qty: 0 | Refills: 0 | Status: DISCONTINUED | OUTPATIENT
Start: 2018-10-01 | End: 2018-10-04

## 2018-10-01 RX ADMIN — MORPHINE SULFATE 2 MILLIGRAM(S): 50 CAPSULE, EXTENDED RELEASE ORAL at 15:02

## 2018-10-01 RX ADMIN — FENTANYL CITRATE 1 PATCH: 50 INJECTION INTRAVENOUS at 12:21

## 2018-10-01 RX ADMIN — MORPHINE SULFATE 1 MILLIGRAM(S): 50 CAPSULE, EXTENDED RELEASE ORAL at 05:45

## 2018-10-01 RX ADMIN — ENOXAPARIN SODIUM 40 MILLIGRAM(S): 100 INJECTION SUBCUTANEOUS at 12:21

## 2018-10-01 RX ADMIN — LIDOCAINE 1 PATCH: 4 CREAM TOPICAL at 22:21

## 2018-10-01 RX ADMIN — MORPHINE SULFATE 2 MILLIGRAM(S): 50 CAPSULE, EXTENDED RELEASE ORAL at 01:07

## 2018-10-01 RX ADMIN — MUPIROCIN 1 APPLICATION(S): 20 OINTMENT TOPICAL at 16:45

## 2018-10-01 RX ADMIN — MORPHINE SULFATE 2 MILLIGRAM(S): 50 CAPSULE, EXTENDED RELEASE ORAL at 21:22

## 2018-10-01 RX ADMIN — FENTANYL CITRATE 1 PATCH: 50 INJECTION INTRAVENOUS at 12:19

## 2018-10-01 RX ADMIN — MORPHINE SULFATE 2 MILLIGRAM(S): 50 CAPSULE, EXTENDED RELEASE ORAL at 13:41

## 2018-10-01 RX ADMIN — Medication 500000 UNIT(S): at 05:50

## 2018-10-01 RX ADMIN — LIDOCAINE 1 PATCH: 4 CREAM TOPICAL at 12:19

## 2018-10-01 RX ADMIN — MUPIROCIN 1 APPLICATION(S): 20 OINTMENT TOPICAL at 06:10

## 2018-10-01 RX ADMIN — Medication 1 DROP(S): at 05:50

## 2018-10-01 RX ADMIN — Medication 500000 UNIT(S): at 12:21

## 2018-10-01 RX ADMIN — MORPHINE SULFATE 1 MILLIGRAM(S): 50 CAPSULE, EXTENDED RELEASE ORAL at 12:21

## 2018-10-01 RX ADMIN — MINOCYCLINE HYDROCHLORIDE 100 MILLIGRAM(S): 45 TABLET, EXTENDED RELEASE ORAL at 06:07

## 2018-10-01 RX ADMIN — MINOCYCLINE HYDROCHLORIDE 100 MILLIGRAM(S): 45 TABLET, EXTENDED RELEASE ORAL at 16:44

## 2018-10-01 RX ADMIN — MORPHINE SULFATE 1 MILLIGRAM(S): 50 CAPSULE, EXTENDED RELEASE ORAL at 06:52

## 2018-10-01 RX ADMIN — MORPHINE SULFATE 2 MILLIGRAM(S): 50 CAPSULE, EXTENDED RELEASE ORAL at 22:11

## 2018-10-01 RX ADMIN — Medication 300 MILLIGRAM(S): at 16:44

## 2018-10-01 RX ADMIN — Medication 1 DROP(S): at 22:17

## 2018-10-01 RX ADMIN — Medication 0.5 MILLIGRAM(S): at 23:27

## 2018-10-01 RX ADMIN — Medication 0.5 MILLIGRAM(S): at 12:21

## 2018-10-01 RX ADMIN — HUMAN INSULIN 8 UNIT(S): 100 INJECTION, SUSPENSION SUBCUTANEOUS at 06:08

## 2018-10-01 RX ADMIN — MORPHINE SULFATE 2 MILLIGRAM(S): 50 CAPSULE, EXTENDED RELEASE ORAL at 01:49

## 2018-10-01 RX ADMIN — MIRTAZAPINE 7.5 MILLIGRAM(S): 45 TABLET, ORALLY DISINTEGRATING ORAL at 22:18

## 2018-10-01 RX ADMIN — Medication 500000 UNIT(S): at 22:18

## 2018-10-01 RX ADMIN — Medication 0.5 MILLIGRAM(S): at 01:10

## 2018-10-01 RX ADMIN — Medication 1 DROP(S): at 15:05

## 2018-10-01 RX ADMIN — SERTRALINE 50 MILLIGRAM(S): 25 TABLET, FILM COATED ORAL at 16:44

## 2018-10-01 RX ADMIN — ERGOCALCIFEROL 8000 UNIT(S): 1.25 CAPSULE ORAL at 17:25

## 2018-10-01 RX ADMIN — CHLORHEXIDINE GLUCONATE 1 APPLICATION(S): 213 SOLUTION TOPICAL at 05:49

## 2018-10-01 RX ADMIN — PANTOPRAZOLE SODIUM 40 MILLIGRAM(S): 20 TABLET, DELAYED RELEASE ORAL at 12:21

## 2018-10-01 RX ADMIN — MORPHINE SULFATE 1 MILLIGRAM(S): 50 CAPSULE, EXTENDED RELEASE ORAL at 12:36

## 2018-10-01 RX ADMIN — Medication 2 MILLIGRAM(S): at 22:18

## 2018-10-01 RX ADMIN — Medication 7.5 MILLIGRAM(S): at 06:07

## 2018-10-01 RX ADMIN — Medication 500000 UNIT(S): at 16:45

## 2018-10-01 NOTE — PROVIDER CONTACT NOTE (OTHER) - BACKGROUND
Admitted s/p hematoma from R hip fx. Ct showed R hip collection. Or for removal of hardware. Previously on pressor support
Admitted s/p hematoma from R hip fx. Ct showed R hip collection. Or for removal of implant
Pt S/P Hip FX, MRSA to Hip
Pt S/P R Hip Fx with primary dx of weakness, sepsis and R side hematoma
Pt admitted for weakness.  Positive for MRSA in rt hip wound. Pt is on Minocycline.  UA and blood culture last on 9/19/18.
Pt admitted s/p fall infected hardware. Readmitted to SICU with hypotension and fevers. Pt now complaining of of LLQ pain near PEG.
Pt admitted to SICU s/p R hip hemiarthroplasty and removal of implant. Febrile, rigors, tachycardia
Pt admitted w/ weakness & R hip hematoma. Pt dx w/ sepsis. 7/27 R hip hardware debridement. 8/3 Abx space and exchange of hardware. 8/22 PICC readjusted and changed to single lumen
Pt admitted w/ weakness & R hip hematoma. Pt dx w/ sepsis. 7/27 R hip hardware debridement. 8/3 Abx space and exchange of hardware. 8/22 PICC readjusted and changed to single lumen
Pt with MRSA in right hip, multiple hardware repairs
Pt with right hip revision MRSA, PEG tube placement
Pt. Post Op R Hip Sx/Vac Placement today.
S/p Rt hip incision infection
admit dx  weakness
pt admitted for sepsis
pt admitted with Weakness
pt admitted with Weakness
pt admitted with Weakness. PMH CVA, HLD, Anxiety
pt admitted with Weakness. PMH CVA, HLD, Anxiety
pt currently on refresh tears
s/p ORIF of R femur on 8/4, hx of sepsis, transferred from [facility] on 9/2
s/p Rt hip incision infection
s/p right hip hardware infection multiple washouts and PEG placement
Pt admitted s/p hip re[air surgical site infection. Readmitted to SICU with hypotension and fevers.
S/p ABX spacer in hip

## 2018-10-01 NOTE — PROVIDER CONTACT NOTE (OTHER) - REASON
BP 95/64
Hgb 7.1
Increase work of breathing
PICC has no blood return & unable to draw vanco trough
PT lethargic
Pt BP elevated
Pt complaining of LLQ pain near PEG site
Pt complaining of stomach pain, residual in PEG tube
Pt has temp of 102.3 rectally
Pt hyperglycemic
Pt hyperglycemic
Pt is febrile
Pt rectal temperature 100.5
Pt unable to get vanco d/t not being able to draw trough from PICC
Pt. Hypotensive, Tachycardic, Febrile
Pt. febrile with 102.3 Rectal Temp and 101.3 Oral Temp
Residual from Peg greater than 100cc. Pt. c/o difficulty breathing
Right Wrist pain
low urine output and laboured breathing while on bipap
noted on pt's RLE an opening on pt's incision
patient's daughter at bedside states that patient has had a cough all day
pt BP low electronic 88/53, manual 86/50
pt rectal temp 101.7, oral temp 97.7. pt no distress.
pt restless unable to sleep
pt spouse refusing nystatin, pt spouse requesting chlorhexidine wash done at 08:00 or 09:00,per spouse if pt sleeping do not wake up for 06:00 nystatin.pt spouse reports wants pt to sleep
pt temp 99.7 axillary, pt not opening mouth for oral temp check
pt. had one episode of emesis
spouse stated eyes appear to be swollen and red,
Pt retaining urine. bladder scan 396
Residual noted in peg tube
Pt fingertips upper extremity mottled

## 2018-10-01 NOTE — PROGRESS NOTE ADULT - ASSESSMENT
wound dehiscence   dysphonia   agitation   mrsa sepsis   copd   drug induced hyperglycemia     awaiting dc to rehab   will increase morphine to 2mg prn breakthrough

## 2018-10-01 NOTE — PROVIDER CONTACT NOTE (OTHER) - ACTION/TREATMENT ORDERED:
Franky QUEEN  made aware. As per SICU team marilyn gavin. Will continue to monitor.
MD Bowman notified, Feed Held restart at 5am at fm;/hr and increase by 5 Q 6 hours
ortho came to see pt and will call plastics to help with the closure due to pt not eating properly will be difficult to close properly
NP aware, says current hgb seems to be the current baseline. No actions to be taken at this time.
X-ray
Stop feeding for one hour, restart 10cc/hr increase rate by 10cc  every 8 hours.
Tylenol 650mg via Peg
Morphine 1Mg   given IV with good effect/PP/RN
Nilesh Bowman MD notified. No new recommendations made at this time.
Nilesh Bowman notified. 500CC NS Bolus given. Vitals Rechecked. RRT called. SEE RRT sheet.
SICU team made aware. No intervention at this time. Cont to monitor
sternum rub done, became more arousable able to  follow commands but still very lethargic, seen by Gold No interventions needed
As per NP reassess BP in 30 mins. Will continue to monitor pt closely.
As per SICU team cover  with 2 u. IV fluids will be changed to non dextrose solution. Continue to monitor.
As per SICU team no intervention at this time. Continue to cover pt with sliding scale. No insulin drip at this time. Pt very labile. Continue with dextrose IVF
BRET Mayberry made aware, no new orders received
BRET Zavala made aware no new orders received
BRET Zavala made aware no new orders received
Blood culture times two. Tylenol 650mg.
Dilaudid given for pain, no need for fluid as per Dr lake, no concern for urine output, and continue po med since pt is able to follow command and swallow med.
Give Ativan, give po med, if continues to be agitated, will intubate. Dr Arias at bedside discussing plan with .
MD states he will see what he can do
MD states he will try to get in contact w/ PICC/Central line team
NP Sonia Guthrie made aware, NP at pt bedside to evaluate
NP notified. Orders to follow.
PA notified and made aware as per PA AM ECG and morphine 2mg. no further interventions. will continue to monitor.
Tylenol ordered. SICU bedside assessment to be performed. Will continue to monitor site and PEG output
connect PEG tube to gravity bag, CXR ordered, tube feeds stopped, pt. started on HFNC
fever workup
hold PEG tube feedings
see above

## 2018-10-01 NOTE — PROGRESS NOTE ADULT - ASSESSMENT
Dementia with sundowning. Rule out delirium. Insomnia could be from pain and sundowning.   Cannot use neuroleptics due to prolonged QTc.     Recommend  No Seroquel/neuroleptics given her elevated QTc and risk for torsades de pointes.  Lower Remeron to 7.5mg qhs to help with insomnia.   Continue with Sertraline.   Recontact Dr. Rodriguez De Leon for recommendations on pain management.  Would not increase Klonopin (2mg is a fair amount for a woman her age and body size).  Discussed with  by telephone, daughter at bedside, and NP.     Irvin Reyes M.D.  Psychiatry  [phone]

## 2018-10-01 NOTE — PROVIDER CONTACT NOTE (OTHER) - DATE AND TIME:
06-Aug-2018 00:00
02-Sep-2018 04:30
04-Aug-2018 22:30
05-Aug-2018 04:00
08-Sep-2018 00:54
09-Sep-2018 17:10
09-Sep-2018 18:00
13-Sep-2018 01:00
13-Sep-2018 01:00
15-Sep-2018 10:05
19-Sep-2018 06:25
20-Sep-2018 06:10
21-Sep-2018 22:40
24-Aug-2018 09:56
24-Aug-2018 11:48
25-Aug-2018 12:20
25-Aug-2018 17:20
25-Aug-2018 18:30
25-Sep-2018 21:41
26-Aug-2018 07:58
26-Aug-2018 20:20
26-Sep-2018 12:46
27-Aug-2018 21:17
27-Aug-2018 21:47
29-Aug-2018 06:20
29-Sep-2018 23:45
30-Aug-2018 22:30
31-Jul-2018 10:45
31-Jul-2018 22:15
31-Aug-2018 04:00
02-Sep-2018 02:00

## 2018-10-01 NOTE — PROGRESS NOTE ADULT - SUBJECTIVE AND OBJECTIVE BOX
Events noted.  says that pt. has not slept for the past 3 days and nights in part from pain. Also recently paranoid, telling her  she is being raped. No agitation.       Vital Signs Last 24 Hrs  T(C): 36.9 (01 Oct 2018 16:19), Max: 36.9 (01 Oct 2018 16:19)  T(F): 98.5 (01 Oct 2018 16:19), Max: 98.5 (01 Oct 2018 16:19)  HR: 87 (01 Oct 2018 16:19) (87 - 101)  BP: 110/74 (01 Oct 2018 16:19) (110/74 - 156/94)  BP(mean): --  RR: 18 (01 Oct 2018 16:19) (17 - 18)  SpO2: 97% (01 Oct 2018 16:19) (95% - 100%)                          9.6    10.6  )-----------( 382      ( 01 Oct 2018 12:56 )             28.8     QTc today is 532 ms.                   MEDICATIONS  (STANDING):  artificial tears (preservative free) Ophthalmic Solution 1 Drop(s) Both EYES three times a day  buDESOnide   0.5 milliGRAM(s) Respule 0.5 milliGRAM(s) Inhalation every 12 hours  chlorhexidine 4% Liquid 1 Application(s) Topical <User Schedule>  clonazePAM Tablet 2 milliGRAM(s) Oral <User Schedule>  dextrose 5%. 1000 milliLiter(s) (50 mL/Hr) IV Continuous <Continuous>  dextrose 50% Injectable 12.5 Gram(s) IV Push once  dextrose 50% Injectable 25 Gram(s) IV Push once  dextrose 50% Injectable 25 Gram(s) IV Push once  enoxaparin Injectable 40 milliGRAM(s) SubCutaneous daily  ergocalciferol Drops 8000 Unit(s) Oral daily  fentaNYL   Patch  12 MICROgram(s)/Hr 1 Patch Transdermal every 72 hours  ferrous    sulfate Liquid 300 milliGRAM(s) Enteral Tube daily  insulin lispro (HumaLOG) corrective regimen sliding scale   SubCutaneous every 6 hours  insulin NPH human recombinant 8 Unit(s) SubCutaneous <User Schedule>  lidocaine   Patch 1 Patch Transdermal every 24 hours  minocycline 100 milliGRAM(s) Oral two times a day  mirtazapine 7.5 milliGRAM(s) Oral at bedtime  mupirocin 2% Ointment 1 Application(s) Topical two times a day  nystatin    Suspension 457216 Unit(s) Oral four times a day  pantoprazole  Injectable 40 milliGRAM(s) IV Push every 24 hours  predniSONE   Tablet 7.5 milliGRAM(s) Oral <User Schedule>  sertraline 50 milliGRAM(s) Oral daily    MEDICATIONS  (PRN):  acetaminophen    Suspension .. 650 milliGRAM(s) Oral every 8 hours PRN Mild Pain (1 - 3)  acetaminophen  IVPB .. 1000 milliGRAM(s) IV Intermittent every 8 hours PRN Mild Pain (1 - 3), Moderate Pain (4 - 6), Severe Pain (7 - 10)  dextrose 40% Gel 15 Gram(s) Oral once PRN Blood Glucose LESS THAN 70 milliGRAM(s)/deciLiter  glucagon  Injectable 1 milliGRAM(s) IntraMuscular once PRN Glucose <70 milliGRAM(s)/deciLiter  morphine  - Injectable 2 milliGRAM(s) IV Push every 4 hours PRN breakthrough pain  nystatin Powder 1 Application(s) Topical two times a day PRN rash/itchiness      Elderly WF in bed with fluctuating levels of arousal. Mute.

## 2018-10-01 NOTE — PROGRESS NOTE ADULT - SUBJECTIVE AND OBJECTIVE BOX
---___---___---___---___---___---___ ---___---___---___---___---___---___---___---___---___---                    <<<  M E D I C A L   A T T E N D I N G    F O L L O W    U P   N O T E  >>>    patient remains unchanged and having extra anxiety and agitation  (as per  )      ---___---___---___---___---___---      <<<  MEDICATIONS:  >>>    MEDICATIONS  (STANDING):  artificial tears (preservative free) Ophthalmic Solution 1 Drop(s) Both EYES three times a day  buDESOnide   0.5 milliGRAM(s) Respule 0.5 milliGRAM(s) Inhalation every 12 hours  chlorhexidine 4% Liquid 1 Application(s) Topical <User Schedule>  clonazePAM Tablet 2 milliGRAM(s) Oral <User Schedule>  dextrose 5%. 1000 milliLiter(s) (50 mL/Hr) IV Continuous <Continuous>  dextrose 50% Injectable 12.5 Gram(s) IV Push once  dextrose 50% Injectable 25 Gram(s) IV Push once  dextrose 50% Injectable 25 Gram(s) IV Push once  enoxaparin Injectable 40 milliGRAM(s) SubCutaneous daily  ergocalciferol Drops 8000 Unit(s) Oral daily  fentaNYL   Patch  12 MICROgram(s)/Hr 1 Patch Transdermal every 72 hours  ferrous    sulfate Liquid 300 milliGRAM(s) Enteral Tube daily  insulin lispro (HumaLOG) corrective regimen sliding scale   SubCutaneous every 6 hours  insulin NPH human recombinant 8 Unit(s) SubCutaneous <User Schedule>  lidocaine   Patch 1 Patch Transdermal every 24 hours  minocycline 100 milliGRAM(s) Oral two times a day  mirtazapine 15 milliGRAM(s) Oral <User Schedule>  mupirocin 2% Ointment 1 Application(s) Topical two times a day  nystatin    Suspension 513527 Unit(s) Oral four times a day  pantoprazole  Injectable 40 milliGRAM(s) IV Push every 24 hours  predniSONE   Tablet 7.5 milliGRAM(s) Oral <User Schedule>  sertraline 50 milliGRAM(s) Oral daily      MEDICATIONS  (PRN):  acetaminophen    Suspension .. 650 milliGRAM(s) Oral every 8 hours PRN Mild Pain (1 - 3)  acetaminophen  IVPB .. 1000 milliGRAM(s) IV Intermittent every 8 hours PRN Mild Pain (1 - 3), Moderate Pain (4 - 6), Severe Pain (7 - 10)  dextrose 40% Gel 15 Gram(s) Oral once PRN Blood Glucose LESS THAN 70 milliGRAM(s)/deciLiter  glucagon  Injectable 1 milliGRAM(s) IntraMuscular once PRN Glucose <70 milliGRAM(s)/deciLiter  morphine  - Injectable 1 milliGRAM(s) IV Push every 4 hours PRN breakthrough pain  nystatin Powder 1 Application(s) Topical two times a day PRN rash/itchiness       ---___---___---___---___---___---     <<<REVIEW OF SYSTEM: >>>       ---___---___---___---___---___---          <<<  VITAL SIGNS: >>>    T(F): 97.6 (10-01-18 @ 05:36), Max: 98.5 (09-30-18 @ 14:50)  HR: 93 (10-01-18 @ 05:36) (82 - 101)  BP: 124/78 (10-01-18 @ 05:36) (122/72 - 156/94)  RR: 17 (10-01-18 @ 05:36) (17 - 18)  SpO2: 95% (10-01-18 @ 05:36) (95% - 100%)  Wt(kg): --  CAPILLARY BLOOD GLUCOSE      POCT Blood Glucose.: 120 mg/dL (01 Oct 2018 05:44)    I&O's Summary    30 Sep 2018 07:01  -  01 Oct 2018 07:00  --------------------------------------------------------  IN: 1805 mL / OUT: 1000 mL / NET: 805 mL         ---___---___---___---___---___---                       PHYSICAL EXAM:    GEN: A&O X 1 , NAD , comfortable  HEENT: NCAT, PERRL, MMM, no scleral icterus, hearing intact  NECK: Supple, No JVD  CVS: S1S2 , regular , No M/R/G appreciated  PULM: CTA B/L,  no W/R/R appreciated  ABD.: soft. non tender, non distended,  bowel sounds present peg noted   Extrem: intact pulses , no edema noted  Derm: No rash or ecchymosis noted wound healing on right leg   PSYCH: normal mood,  depression,  anxious     ---___---___---___---___---___---     <<<  LAB AND IMAGING: >>>                          8.9    7.45  )-----------( 333      ( 30 Sep 2018 06:47 )             26.9                                                [All pertinent / recent available Imaging reports and other labs reviewed]     ---___---___---___---___---___---___ ---___---___---___---___---           <<<  A S S E S S M E N T   A N D   P L A N :  >>>          -GI/DVT Prophylaxis.    --------------------------------------------  Case discussed with   Education given on     >>______________________<<      Deniz Bolden .         phone   [phone]

## 2018-10-01 NOTE — PROVIDER CONTACT NOTE (OTHER) - SITUATION
Pt fingertips upper extremity mottled
PICC has no blood return & unable to draw vanco trough. Contact PICC/central line team several times and have not got a response. Night nurse also tried contacting them.
PT vital signs stable 108/62  93% O2, but lethargic and difficult to arouse.  pupils equal and reactive,
Pt BP 95/64.
Pt admitted with sepsis. Has had previous blood transfusions due to symptomatic anemia with hgb>7.0. No active bleeding noted.
Pt complaining of LLQ pain near PEG site
Pt complaining of stomach pain, residual in PEG tube
Pt has temp of 102.3 rectally
Pt hyperglycemic
Pt hyperglycemic with two BG > 200
Pt is agitated, Increase work of breathing while on Bipap
Pt rectal temperature 100.5
Pt unable to get vanco d/t not being able to draw trough from PICC. Contacted central line team several times still no response
Pt's appears lethargic, oral temp 100.8 F
Pt. /96, 
Pt. c/o right wrist pain and appears slightly swollen and warm to touch.
Pt. febrile with 102.3 Rectal Temp and 101.3 Oral Temp
Pt. was hypotensive (90/54), Tachycardic (120BPM), Febrile (100.3F) and High Respirations (25BPM)
Residual from Peg greater than 100cc. Pt. c/o difficulty breathing
Urine output 15cc, down from 30-40cc previously, and increase in work of breathing, agitated, pulling bipap mask
patient's daughter at bedside states that patient has had a cough all day
pt BP low electronic 88/53, manual 86/50
pt rectal temp 101.7, oral temp 97.7. pt no distress.
pt restless unable to sleep.
pt spouse refusing nystatin, pt spouse requesting chlorhexidine wash done at 08:00 or 09:00,per spouse if pt sleeping do not wake up for 06:00 nystatin.pt spouse reports wants pt to sleep
pt temp 99.7 axillary, pt not opening mouth for oral temp check
pt. on pivot @ goal 30cc/hour with 1 episode of emesis
spouse stated eyes appear to be swollen and red,
Pt retaining urine. bladder scan 396
Pt noted w/ residual in peg tube.

## 2018-10-01 NOTE — PROVIDER CONTACT NOTE (OTHER) - NAME OF MD/NP/PA/DO NOTIFIED:
Mar Sung, PA
BENJI Guthrie
BENJI Gutierrez
BRET Maddox
BRET Mayberry
BRET Zavala
BRET Zavala
Cynthia Wilkins, PA
Fabricio Mccray (GI)
Ginny QUEEN
Leanne Palomino, NP
MD Cash
MD Cash
MD Franky Ni, PA
MD Nilesh ambrosio
MD Rishabh
MD Sam Brock
MD Sam Brock
Md Cash
[facility]
Medicine NP/Nadya
NP Adela
Naga Villafuerte
Naga Villafuerte
Nilesh Bowman MD
Nilesh Bowman MD
Nilesh Mcmahon NP
Sona Wadsworth
dr morales from ortho and kenneth Simons
Cynthia Wilkins, PA
Nilesh Bowman.

## 2018-10-02 LAB
ANION GAP SERPL CALC-SCNC: 12 MMOL/L — SIGNIFICANT CHANGE UP (ref 5–17)
BUN SERPL-MCNC: 30 MG/DL — HIGH (ref 7–23)
CALCIUM SERPL-MCNC: 9.5 MG/DL — SIGNIFICANT CHANGE UP (ref 8.4–10.5)
CHLORIDE SERPL-SCNC: 93 MMOL/L — LOW (ref 96–108)
CO2 SERPL-SCNC: 27 MMOL/L — SIGNIFICANT CHANGE UP (ref 22–31)
CREAT SERPL-MCNC: 0.52 MG/DL — SIGNIFICANT CHANGE UP (ref 0.5–1.3)
GLUCOSE BLDC GLUCOMTR-MCNC: 121 MG/DL — HIGH (ref 70–99)
GLUCOSE BLDC GLUCOMTR-MCNC: 172 MG/DL — HIGH (ref 70–99)
GLUCOSE BLDC GLUCOMTR-MCNC: 98 MG/DL — SIGNIFICANT CHANGE UP (ref 70–99)
GLUCOSE SERPL-MCNC: 116 MG/DL — HIGH (ref 70–99)
POTASSIUM SERPL-MCNC: 3.9 MMOL/L — SIGNIFICANT CHANGE UP (ref 3.5–5.3)
POTASSIUM SERPL-SCNC: 3.9 MMOL/L — SIGNIFICANT CHANGE UP (ref 3.5–5.3)
SODIUM SERPL-SCNC: 132 MMOL/L — LOW (ref 135–145)

## 2018-10-02 RX ORDER — HYDROMORPHONE HYDROCHLORIDE 2 MG/ML
1 INJECTION INTRAMUSCULAR; INTRAVENOUS; SUBCUTANEOUS EVERY 4 HOURS
Qty: 0 | Refills: 0 | Status: DISCONTINUED | OUTPATIENT
Start: 2018-10-02 | End: 2018-10-02

## 2018-10-02 RX ORDER — CLONAZEPAM 1 MG
1.5 TABLET ORAL
Qty: 0 | Refills: 0 | Status: DISCONTINUED | OUTPATIENT
Start: 2018-10-02 | End: 2018-10-04

## 2018-10-02 RX ORDER — HYDROMORPHONE HYDROCHLORIDE 2 MG/ML
1 INJECTION INTRAMUSCULAR; INTRAVENOUS; SUBCUTANEOUS EVERY 6 HOURS
Qty: 0 | Refills: 0 | Status: DISCONTINUED | OUTPATIENT
Start: 2018-10-02 | End: 2018-10-04

## 2018-10-02 RX ORDER — HYDROMORPHONE HYDROCHLORIDE 2 MG/ML
1 INJECTION INTRAMUSCULAR; INTRAVENOUS; SUBCUTANEOUS ONCE
Qty: 0 | Refills: 0 | Status: DISCONTINUED | OUTPATIENT
Start: 2018-10-02 | End: 2018-10-02

## 2018-10-02 RX ADMIN — Medication 300 MILLIGRAM(S): at 14:05

## 2018-10-02 RX ADMIN — ERGOCALCIFEROL 8000 UNIT(S): 1.25 CAPSULE ORAL at 15:44

## 2018-10-02 RX ADMIN — Medication 1 DROP(S): at 22:22

## 2018-10-02 RX ADMIN — Medication 1 DROP(S): at 06:42

## 2018-10-02 RX ADMIN — SERTRALINE 50 MILLIGRAM(S): 25 TABLET, FILM COATED ORAL at 14:06

## 2018-10-02 RX ADMIN — Medication 1 DROP(S): at 14:06

## 2018-10-02 RX ADMIN — HYDROMORPHONE HYDROCHLORIDE 1 MILLIGRAM(S): 2 INJECTION INTRAMUSCULAR; INTRAVENOUS; SUBCUTANEOUS at 18:29

## 2018-10-02 RX ADMIN — ENOXAPARIN SODIUM 40 MILLIGRAM(S): 100 INJECTION SUBCUTANEOUS at 14:06

## 2018-10-02 RX ADMIN — HYDROMORPHONE HYDROCHLORIDE 1 MILLIGRAM(S): 2 INJECTION INTRAMUSCULAR; INTRAVENOUS; SUBCUTANEOUS at 15:42

## 2018-10-02 RX ADMIN — LIDOCAINE 1 PATCH: 4 CREAM TOPICAL at 22:24

## 2018-10-02 RX ADMIN — MINOCYCLINE HYDROCHLORIDE 100 MILLIGRAM(S): 45 TABLET, EXTENDED RELEASE ORAL at 17:21

## 2018-10-02 RX ADMIN — LIDOCAINE 1 PATCH: 4 CREAM TOPICAL at 15:23

## 2018-10-02 RX ADMIN — HYDROMORPHONE HYDROCHLORIDE 1 MILLIGRAM(S): 2 INJECTION INTRAMUSCULAR; INTRAVENOUS; SUBCUTANEOUS at 21:43

## 2018-10-02 RX ADMIN — Medication 500000 UNIT(S): at 06:42

## 2018-10-02 RX ADMIN — Medication 1.5 MILLIGRAM(S): at 22:24

## 2018-10-02 RX ADMIN — Medication 500000 UNIT(S): at 22:24

## 2018-10-02 RX ADMIN — HYDROMORPHONE HYDROCHLORIDE 1 MILLIGRAM(S): 2 INJECTION INTRAMUSCULAR; INTRAVENOUS; SUBCUTANEOUS at 14:06

## 2018-10-02 RX ADMIN — MUPIROCIN 1 APPLICATION(S): 20 OINTMENT TOPICAL at 17:20

## 2018-10-02 RX ADMIN — Medication 1: at 13:08

## 2018-10-02 RX ADMIN — PANTOPRAZOLE SODIUM 40 MILLIGRAM(S): 20 TABLET, DELAYED RELEASE ORAL at 14:05

## 2018-10-02 RX ADMIN — HYDROMORPHONE HYDROCHLORIDE 1 MILLIGRAM(S): 2 INJECTION INTRAMUSCULAR; INTRAVENOUS; SUBCUTANEOUS at 15:23

## 2018-10-02 RX ADMIN — MIRTAZAPINE 7.5 MILLIGRAM(S): 45 TABLET, ORALLY DISINTEGRATING ORAL at 22:23

## 2018-10-02 RX ADMIN — NYSTATIN CREAM 1 APPLICATION(S): 100000 CREAM TOPICAL at 06:45

## 2018-10-02 RX ADMIN — Medication 500000 UNIT(S): at 17:20

## 2018-10-02 RX ADMIN — MORPHINE SULFATE 2 MILLIGRAM(S): 50 CAPSULE, EXTENDED RELEASE ORAL at 07:05

## 2018-10-02 RX ADMIN — MORPHINE SULFATE 2 MILLIGRAM(S): 50 CAPSULE, EXTENDED RELEASE ORAL at 06:26

## 2018-10-02 RX ADMIN — Medication 7.5 MILLIGRAM(S): at 06:25

## 2018-10-02 RX ADMIN — Medication 500000 UNIT(S): at 14:05

## 2018-10-02 RX ADMIN — HYDROMORPHONE HYDROCHLORIDE 1 MILLIGRAM(S): 2 INJECTION INTRAMUSCULAR; INTRAVENOUS; SUBCUTANEOUS at 20:49

## 2018-10-02 RX ADMIN — MINOCYCLINE HYDROCHLORIDE 100 MILLIGRAM(S): 45 TABLET, EXTENDED RELEASE ORAL at 06:25

## 2018-10-02 RX ADMIN — MUPIROCIN 1 APPLICATION(S): 20 OINTMENT TOPICAL at 06:43

## 2018-10-02 RX ADMIN — HUMAN INSULIN 8 UNIT(S): 100 INJECTION, SUSPENSION SUBCUTANEOUS at 06:23

## 2018-10-02 RX ADMIN — CHLORHEXIDINE GLUCONATE 1 APPLICATION(S): 213 SOLUTION TOPICAL at 06:42

## 2018-10-02 NOTE — PROGRESS NOTE ADULT - SUBJECTIVE AND OBJECTIVE BOX
Orthopaedic Surgery Progress Note  Patient seen and examined, patient doing better,  at bedside.  Patient has woudnvac on distal part of incision, the rest of incision healing well.   Vital Signs Last 24 Hrs  T(C): 36.8 (02 Oct 2018 06:14), Max: 36.9 (01 Oct 2018 16:19)  T(F): 98.3 (02 Oct 2018 06:14), Max: 98.5 (01 Oct 2018 16:19)  HR: 87 (02 Oct 2018 06:14) (87 - 90)  BP: 132/79 (02 Oct 2018 06:14) (110/74 - 132/79)  BP(mean): --  RR: 18 (02 Oct 2018 06:14) (18 - 18)  SpO2: 100% (02 Oct 2018 06:14) (96% - 100%)    PE  NAD  RLE:   incision healing well, wound vac in place  motor intact GS/TA/EHL  SILT S/S/SP/DP  WWP           A/P: 68 year old female s/p I&D, Explant s/p Distal Femur ORIF    - Pain Control  - PT/OT, OOB  - Wound Vac per PRSx  - Care per primary team

## 2018-10-02 NOTE — CHART NOTE - NSCHARTNOTEFT_GEN_A_CORE
Malnutrition follow up per protocol. Enteral feeds tolerated well. No complaints from patient of private HHA. Patient looks better, smiling without complaints. Patient's son noticed weight gain (ricardo face) weight gain noticeable.  Source: Patient [ ]    Family [X ]     other [X ]  HHA      Chart reviewed events noted. History of MRSA sepsis, dementia, Alzheimer's, wound dehiscence, anxiety, COPD, dysphonia, chronic pain> Not a candidate for swallow test per SLP.   S/P PEG               Enteral /Parenteral Nutrition:       Current Weight:   % Weight Change    Pertinent Medications: MEDICATIONS  (STANDING):  artificial tears (preservative free) Ophthalmic Solution 1 Drop(s) Both EYES three times a day  buDESOnide   0.5 milliGRAM(s) Respule 0.5 milliGRAM(s) Inhalation every 12 hours  chlorhexidine 4% Liquid 1 Application(s) Topical <User Schedule>  dextrose 5%. 1000 milliLiter(s) (50 mL/Hr) IV Continuous <Continuous>  dextrose 50% Injectable 12.5 Gram(s) IV Push once  dextrose 50% Injectable 25 Gram(s) IV Push once  dextrose 50% Injectable 25 Gram(s) IV Push once  enoxaparin Injectable 40 milliGRAM(s) SubCutaneous daily  ergocalciferol Drops 8000 Unit(s) Oral daily  fentaNYL   Patch  12 MICROgram(s)/Hr 1 Patch Transdermal every 72 hours  ferrous    sulfate Liquid 300 milliGRAM(s) Enteral Tube daily  insulin lispro (HumaLOG) corrective regimen sliding scale   SubCutaneous every 6 hours  insulin NPH human recombinant 8 Unit(s) SubCutaneous <User Schedule>  lidocaine   Patch 1 Patch Transdermal every 24 hours  LORazepam     Tablet 1 milliGRAM(s) Oral at bedtime  minocycline 100 milliGRAM(s) Oral two times a day  mirtazapine 7.5 milliGRAM(s) Oral at bedtime  mupirocin 2% Ointment 1 Application(s) Topical two times a day  nystatin    Suspension 385521 Unit(s) Oral four times a day  pantoprazole  Injectable 40 milliGRAM(s) IV Push every 24 hours  predniSONE   Tablet 7.5 milliGRAM(s) Oral <User Schedule>  sertraline 50 milliGRAM(s) Oral daily    MEDICATIONS  (PRN):  acetaminophen    Suspension .. 650 milliGRAM(s) Oral every 8 hours PRN Mild Pain (1 - 3)  acetaminophen  IVPB .. 1000 milliGRAM(s) IV Intermittent every 8 hours PRN Mild Pain (1 - 3), Moderate Pain (4 - 6), Severe Pain (7 - 10)  dextrose 40% Gel 15 Gram(s) Oral once PRN Blood Glucose LESS THAN 70 milliGRAM(s)/deciLiter  glucagon  Injectable 1 milliGRAM(s) IntraMuscular once PRN Glucose <70 milliGRAM(s)/deciLiter  HYDROmorphone  Injectable 1 milliGRAM(s) IV Push every 4 hours PRN Severe Pain (7 - 10)  nystatin Powder 1 Application(s) Topical two times a day PRN rash/itchiness    Pertinent Labs:  10-02 Na132 mmol/L<L> Glu 116 mg/dL<H> K+ 3.9 mmol/L Cr  0.52 mg/dL BUN 30 mg/dL<H>      Skin:     Estimated Needs:   [X ] no change since previous assessment  [ ] recalculated:       Previous Nutrition Diagnosis:    [X ] Malnutrition          Nutrition Diagnosis is [ ] ongoing  [ ] resolved [ ] not applicable          New Nutrition Diagnosis: [X ] not applicable      Recommend    1.Glucerna 1.2 @ 35ml/hr x24 hrs to provide 840ml, 1008 calories, 30/kg, protein 50gm, 1.47gm/kg based on dosing weight 33.8kg.  (Weight note : current 60kg likely with wound vac on bed)  fluid 820ml  2. Other: defer added  free water to MD/team     3.add wendi 1x daiy       Monitoring and Evaluation:     [ ] PO intake [X ] Tolerance to diet prescription [ X] weights [X ] follow up per protocol    [ ] other: Malnutrition follow up per protocol. Enteral feeds tolerated well. No complaints from patient of private HHA. Patient looks better, smiling without complaints. Patient's son noticed weight gain (ricardo face) weight gain noticeable.  Source: Patient [ ]    Family [X ]     other [X ]  HHA      Chart reviewed events noted. History of MRSA sepsis, dementia, Alzheimer's, wound dehiscence, anxiety, COPD, dysphonia, chronic pain> Not a candidate for swallow test per SLP.   S/P PEG       Enteral /Parenteral Nutrition: Glucerna 1.2 @ 35ml/hr x24 hrs to provide 840ml, 1008 calories, 30/kg, protein 50gm, 1.47gm/kg based on dosing weight 33.8kg.  (Weight note : current 60kg likely with wound vac on bed)  fluid 820ml        Current Weight: 60kg??  Dosing Weight  33.8kg  request reweigh    Pertinent Medications: MEDICATIONS  (STANDING):  artificial tears (preservative free) Ophthalmic Solution 1 Drop(s) Both EYES three times a day  buDESOnide   0.5 milliGRAM(s) Respule 0.5 milliGRAM(s) Inhalation every 12 hours  chlorhexidine 4% Liquid 1 Application(s) Topical <User Schedule>  dextrose 5%. 1000 milliLiter(s) (50 mL/Hr) IV Continuous <Continuous>  dextrose 50% Injectable 12.5 Gram(s) IV Push once  dextrose 50% Injectable 25 Gram(s) IV Push once  dextrose 50% Injectable 25 Gram(s) IV Push once  enoxaparin Injectable 40 milliGRAM(s) SubCutaneous daily  ergocalciferol Drops 8000 Unit(s) Oral daily  fentaNYL   Patch  12 MICROgram(s)/Hr 1 Patch Transdermal every 72 hours  ferrous    sulfate Liquid 300 milliGRAM(s) Enteral Tube daily  insulin lispro (HumaLOG) corrective regimen sliding scale   SubCutaneous every 6 hours  insulin NPH human recombinant 8 Unit(s) SubCutaneous <User Schedule>  lidocaine   Patch 1 Patch Transdermal every 24 hours  LORazepam     Tablet 1 milliGRAM(s) Oral at bedtime  minocycline 100 milliGRAM(s) Oral two times a day  mirtazapine 7.5 milliGRAM(s) Oral at bedtime  mupirocin 2% Ointment 1 Application(s) Topical two times a day  nystatin    Suspension 192675 Unit(s) Oral four times a day  pantoprazole  Injectable 40 milliGRAM(s) IV Push every 24 hours  predniSONE   Tablet 7.5 milliGRAM(s) Oral <User Schedule>  sertraline 50 milliGRAM(s) Oral daily    MEDICATIONS  (PRN):  acetaminophen    Suspension .. 650 milliGRAM(s) Oral every 8 hours PRN Mild Pain (1 - 3)  acetaminophen  IVPB .. 1000 milliGRAM(s) IV Intermittent every 8 hours PRN Mild Pain (1 - 3), Moderate Pain (4 - 6), Severe Pain (7 - 10)  dextrose 40% Gel 15 Gram(s) Oral once PRN Blood Glucose LESS THAN 70 milliGRAM(s)/deciLiter  glucagon  Injectable 1 milliGRAM(s) IntraMuscular once PRN Glucose <70 milliGRAM(s)/deciLiter  HYDROmorphone  Injectable 1 milliGRAM(s) IV Push every 4 hours PRN Severe Pain (7 - 10)  nystatin Powder 1 Application(s) Topical two times a day PRN rash/itchiness    Pertinent Labs:  10-02 Na132 mmol/L<L> Glu 116 mg/dL<H> K+ 3.9 mmol/L Cr  0.52 mg/dL BUN 30 mg/dL<H>      Skin: maryjane heel, sacrum st II,  MRSA R mid thigh wound    Estimated Needs:   [X ] no change since previous assessment  [ ] recalculated:       Previous Nutrition Diagnosis:    [X ] Malnutrition          Nutrition Diagnosis is [X ] ongoing  [ ] resolved [ ] not applicable          New Nutrition Diagnosis: [X ] not applicable      Recommend    1.Glucerna 1.2 @ 35ml/hr x24 hrs to provide 840ml, 1008 calories, 30/kg, protein 50gm, 1.47gm/kg based on dosing weight 33.8kg.  (Weight note : current 60kg likely with wound vac on bed)  fluid 820ml  2. Other: defer added  free water to MD/team     3. add wendi 1x daily       Monitoring and Evaluation:     [ ] PO intake [X ] Tolerance to diet prescription [ X] weights [X ] follow up per protocol    [ ] other:

## 2018-10-02 NOTE — PROGRESS NOTE ADULT - ASSESSMENT
Dementia. Sundowning. Possible delirium.   Insomnia in large part due to pain and to some degree, sundowning.  We cannot give neuroleptics given her prolonged QTc interval.  We should not stop Klonopin 2mg qhs abruptly as she will withdraw.  Discussed with  that we can give Ativan prn only if Klonopin is slowly tapered and her respiratory status is stable. I informed the  of the risk of respiratory failure when combining opiates and benzodiazepines.     Recommend  Taper Klonopin to 1.5mg p.o. qhs. Start Ativan 0.50mg p.o. q8h prn anxiety. Hold Ativan and Klonopin if respiratory rate is 10 or less.  No neuroleptics until QTc is under 500ms.  Continue with Remeron 7.5mg qhs and zoloft 50mg/day.   Discussed with NP, Dr. Bolden, and the pt's .    Irvin Reyes M.D.  Psychiatry  [phone]

## 2018-10-02 NOTE — PROGRESS NOTE ADULT - NSHPATTENDINGPLANDISCUSS_GEN_ALL_CORE
residents
patient's , [facility] attending, Deniz Bolden MD at bedside.
 of the pt.
, daughter Dr Rey
BENJI and  and Dr. Bolden
Dr. Bolden, NP,  of pt.
NP
NP, 
NP,  of pt.
Nurse manager
Pt's  at bedside
medical team, ortho, family
ortho, 
pts daughter and NP in detail
resident
resident
SICU
resident
 surgery
, ID, ortho
, ortho
, ortho
Daughter
ortho
ortho, family
resident
, ortho
Ortho, ID
daughter
family
resident
resident
SICU
surgery, family
BENJI Box
BENJI Marie
Dr. Nazario
resident
BENJI Forbes
NP
EDISON Smalls and patient's daughter
NP
NP
Ortho Team
patient's family (2 daughters and ) and Dr. Valiente
sicu
SICU

## 2018-10-02 NOTE — PROGRESS NOTE ADULT - PROBLEM SELECTOR PLAN 2
continue pain management .  dr romero was called and cannot do an evaluation or give any recommendation over the phone.

## 2018-10-02 NOTE — PROGRESS NOTE ADULT - ASSESSMENT
agitation   dementia   mrsa sepsis  hip fracture left   dysphonia       agitation has gotten worse and the patient meds are being optimized by psychiatry . will change morphine to dilaud q 4hrs prn breakthrough

## 2018-10-02 NOTE — PROGRESS NOTE ADULT - SUBJECTIVE AND OBJECTIVE BOX
---___---___---___---___---___---___ ---___---___---___---___---___---___---___---___---___---                    <<<  M E D I C A L   A T T E N D I N G    F O L L O W    U P   N O T E  >>>    has been getting more agitated as per  at night. thiswas explained that her dementia and sundowning are making her worse. and also he pain control has been changed to dilaudid and ativan 0.5 mg po qhs prn agitation was started .   ---___---___---___---___---___---      <<<  MEDICATIONS:  >>>    MEDICATIONS  (STANDING):  artificial tears (preservative free) Ophthalmic Solution 1 Drop(s) Both EYES three times a day  buDESOnide   0.5 milliGRAM(s) Respule 0.5 milliGRAM(s) Inhalation every 12 hours  chlorhexidine 4% Liquid 1 Application(s) Topical <User Schedule>  clonazePAM Tablet 2 milliGRAM(s) Oral <User Schedule>  dextrose 5%. 1000 milliLiter(s) (50 mL/Hr) IV Continuous <Continuous>  dextrose 50% Injectable 12.5 Gram(s) IV Push once  dextrose 50% Injectable 25 Gram(s) IV Push once  dextrose 50% Injectable 25 Gram(s) IV Push once  enoxaparin Injectable 40 milliGRAM(s) SubCutaneous daily  ergocalciferol Drops 8000 Unit(s) Oral daily  fentaNYL   Patch  12 MICROgram(s)/Hr 1 Patch Transdermal every 72 hours  ferrous    sulfate Liquid 300 milliGRAM(s) Enteral Tube daily  HYDROmorphone  Injectable 1 milliGRAM(s) IV Push once  insulin lispro (HumaLOG) corrective regimen sliding scale   SubCutaneous every 6 hours  insulin NPH human recombinant 8 Unit(s) SubCutaneous <User Schedule>  lidocaine   Patch 1 Patch Transdermal every 24 hours  minocycline 100 milliGRAM(s) Oral two times a day  mirtazapine 7.5 milliGRAM(s) Oral at bedtime  mupirocin 2% Ointment 1 Application(s) Topical two times a day  nystatin    Suspension 946127 Unit(s) Oral four times a day  pantoprazole  Injectable 40 milliGRAM(s) IV Push every 24 hours  predniSONE   Tablet 7.5 milliGRAM(s) Oral <User Schedule>  sertraline 50 milliGRAM(s) Oral daily      MEDICATIONS  (PRN):  acetaminophen    Suspension .. 650 milliGRAM(s) Oral every 8 hours PRN Mild Pain (1 - 3)  acetaminophen  IVPB .. 1000 milliGRAM(s) IV Intermittent every 8 hours PRN Mild Pain (1 - 3), Moderate Pain (4 - 6), Severe Pain (7 - 10)  dextrose 40% Gel 15 Gram(s) Oral once PRN Blood Glucose LESS THAN 70 milliGRAM(s)/deciLiter  glucagon  Injectable 1 milliGRAM(s) IntraMuscular once PRN Glucose <70 milliGRAM(s)/deciLiter  HYDROmorphone  Injectable 1 milliGRAM(s) IV Push every 4 hours PRN Severe Pain (7 - 10)  nystatin Powder 1 Application(s) Topical two times a day PRN rash/itchiness       ---___---___---___---___---___---          ---___---___---___---___---___---          <<<  VITAL SIGNS: >>>    T(F): 98.7 (10-02-18 @ 13:05), Max: 98.7 (10-02-18 @ 13:05)  HR: 88 (10-02-18 @ 13:05) (85 - 90)  BP: 102/65 (10-02-18 @ 13:05) (95/63 - 132/79)  RR: 18 (10-02-18 @ 13:05) (18 - 18)  SpO2: 92% (10-02-18 @ 13:05) (92% - 100%)  Wt(kg): --  CAPILLARY BLOOD GLUCOSE      POCT Blood Glucose.: 172 mg/dL (02 Oct 2018 12:16)    I&O's Summary    01 Oct 2018 07:01  -  02 Oct 2018 07:00  --------------------------------------------------------  IN: 1770 mL / OUT: 850 mL / NET: 920 mL    02 Oct 2018 07:01  -  02 Oct 2018 15:42  --------------------------------------------------------  IN: 140 mL / OUT: 0 mL / NET: 140 mL         ---___---___---___---___---___---                       PHYSICAL EXAM:    GEN: A&O X 2 , NAD , comfortable thin cachectic female laying in bed   HEENT: NCAT, PERRL, MMM, no scleral icterus, hearing intact  NECK: Supple, No JVD  CVS: S1S2 , regular , No M/R/G appreciated  PULM: CTA B/L,  no W/R/R appreciated  ABD.: soft. non tender, non distended,  bowel sounds present peg noted   Extrem: intact pulses , no edema noted wound vac to right leg   Derm: No rash or ecchymosis noted  PSYCH: normal mood, no depression, not anxious     ---___---___---___---___---___---     <<<  LAB AND IMAGING: >>>                          9.6    10.6  )-----------( 382      ( 01 Oct 2018 12:56 )             28.8               10-02    132<L>  |  93<L>  |  30<H>  ----------------------------<  116<H>  3.9   |  27  |  0.52    Ca    9.5      02 Oct 2018 06:55                                 [All pertinent / recent available Imaging reports and other labs reviewed]     ---___---___---___---___---___---___ ---___---___---___---___---           <<<  A S S E S S M E N T   A N D   P L A N :  >>>          -GI/DVT Prophylaxis.    --------------------------------------------  Case discussed with  arlene roque in regard to dc and care of agitation   Education given on     >>______________________<<      Deniz Bolden .         phone   [phone]

## 2018-10-02 NOTE — PROGRESS NOTE ADULT - SUBJECTIVE AND OBJECTIVE BOX
Events noted.  says last night daughter told him pt. did not sleep until 4 a.m. . She complains of pain now and last night. NP says pt. in pain now as she was just repostioned.  feels she did better on Ativan. She has taken Klonopin for mos. so we cannot abruptly discontinue Klonopin (risk of withdrawal).       Vital Signs Last 24 Hrs  T(C): 37.1 (02 Oct 2018 13:05), Max: 37.1 (02 Oct 2018 13:05)  T(F): 98.7 (02 Oct 2018 13:05), Max: 98.7 (02 Oct 2018 13:05)  HR: 88 (02 Oct 2018 13:05) (85 - 90)  BP: 102/65 (02 Oct 2018 13:05) (95/63 - 132/79)  BP(mean): --  RR: 18 (02 Oct 2018 13:05) (18 - 18)  SpO2: 92% (02 Oct 2018 13:05) (92% - 100%)                          9.6    10.6  )-----------( 382      ( 01 Oct 2018 12:56 )             28.8       10-02    132<L>  |  93<L>  |  30<H>  ----------------------------<  116<H>  3.9   |  27  |  0.52    Ca    9.5      02 Oct 2018 06:55                MEDICATIONS  (STANDING):  artificial tears (preservative free) Ophthalmic Solution 1 Drop(s) Both EYES three times a day  buDESOnide   0.5 milliGRAM(s) Respule 0.5 milliGRAM(s) Inhalation every 12 hours  chlorhexidine 4% Liquid 1 Application(s) Topical <User Schedule>  dextrose 5%. 1000 milliLiter(s) (50 mL/Hr) IV Continuous <Continuous>  dextrose 50% Injectable 12.5 Gram(s) IV Push once  dextrose 50% Injectable 25 Gram(s) IV Push once  dextrose 50% Injectable 25 Gram(s) IV Push once  enoxaparin Injectable 40 milliGRAM(s) SubCutaneous daily  ergocalciferol Drops 8000 Unit(s) Oral daily  fentaNYL   Patch  12 MICROgram(s)/Hr 1 Patch Transdermal every 72 hours  ferrous    sulfate Liquid 300 milliGRAM(s) Enteral Tube daily  insulin lispro (HumaLOG) corrective regimen sliding scale   SubCutaneous every 6 hours  insulin NPH human recombinant 8 Unit(s) SubCutaneous <User Schedule>  lidocaine   Patch 1 Patch Transdermal every 24 hours  LORazepam     Tablet 1 milliGRAM(s) Oral at bedtime  minocycline 100 milliGRAM(s) Oral two times a day  mirtazapine 7.5 milliGRAM(s) Oral at bedtime  mupirocin 2% Ointment 1 Application(s) Topical two times a day  nystatin    Suspension 402880 Unit(s) Oral four times a day  pantoprazole  Injectable 40 milliGRAM(s) IV Push every 24 hours  predniSONE   Tablet 7.5 milliGRAM(s) Oral <User Schedule>  sertraline 50 milliGRAM(s) Oral daily    MEDICATIONS  (PRN):  acetaminophen    Suspension .. 650 milliGRAM(s) Oral every 8 hours PRN Mild Pain (1 - 3)  acetaminophen  IVPB .. 1000 milliGRAM(s) IV Intermittent every 8 hours PRN Mild Pain (1 - 3), Moderate Pain (4 - 6), Severe Pain (7 - 10)  dextrose 40% Gel 15 Gram(s) Oral once PRN Blood Glucose LESS THAN 70 milliGRAM(s)/deciLiter  glucagon  Injectable 1 milliGRAM(s) IntraMuscular once PRN Glucose <70 milliGRAM(s)/deciLiter  HYDROmorphone  Injectable 1 milliGRAM(s) IV Push every 4 hours PRN Severe Pain (7 - 10)  nystatin Powder 1 Application(s) Topical two times a day PRN rash/itchiness      Elderly WF in bed lying supine. When I asked her how she feels she said "not too well" in a slow, low tone voice. She complains of back pain. No psychosis/SI.

## 2018-10-02 NOTE — CHART NOTE - NSCHARTNOTEFT_GEN_A_CORE
As Per pts , pt's pain is not controlled with morphine. Also pt is not sleeping at night.  Discussed with attending, morphine dc, dilaudid started.   Discussed with psychiatry- Clonazepam decreased to 1.5mg qhs, Lorazepam started prn.

## 2018-10-03 DIAGNOSIS — S32.409G: ICD-10-CM

## 2018-10-03 LAB
GLUCOSE BLDC GLUCOMTR-MCNC: 130 MG/DL — HIGH (ref 70–99)
GLUCOSE BLDC GLUCOMTR-MCNC: 136 MG/DL — HIGH (ref 70–99)
GLUCOSE BLDC GLUCOMTR-MCNC: 149 MG/DL — HIGH (ref 70–99)
GLUCOSE BLDC GLUCOMTR-MCNC: 93 MG/DL — SIGNIFICANT CHANGE UP (ref 70–99)
HCT VFR BLD CALC: 26.9 % — LOW (ref 34.5–45)
HGB BLD-MCNC: 8.9 G/DL — LOW (ref 11.5–15.5)
MCHC RBC-ENTMCNC: 30.9 PG — SIGNIFICANT CHANGE UP (ref 27–34)
MCHC RBC-ENTMCNC: 33.1 GM/DL — SIGNIFICANT CHANGE UP (ref 32–36)
MCV RBC AUTO: 93.4 FL — SIGNIFICANT CHANGE UP (ref 80–100)
PLATELET # BLD AUTO: 350 K/UL — SIGNIFICANT CHANGE UP (ref 150–400)
RBC # BLD: 2.88 M/UL — LOW (ref 3.8–5.2)
RBC # FLD: 16.2 % — HIGH (ref 10.3–14.5)
WBC # BLD: 8.66 K/UL — SIGNIFICANT CHANGE UP (ref 3.8–10.5)
WBC # FLD AUTO: 8.66 K/UL — SIGNIFICANT CHANGE UP (ref 3.8–10.5)

## 2018-10-03 PROCEDURE — 99233 SBSQ HOSP IP/OBS HIGH 50: CPT

## 2018-10-03 RX ORDER — MEN-PHOR .5; .5 G/G; G/G
1 LOTION TOPICAL DAILY
Qty: 0 | Refills: 0 | Status: DISCONTINUED | OUTPATIENT
Start: 2018-10-03 | End: 2018-10-04

## 2018-10-03 RX ORDER — PETROLATUM,WHITE
1 JELLY (GRAM) TOPICAL DAILY
Qty: 0 | Refills: 0 | Status: DISCONTINUED | OUTPATIENT
Start: 2018-10-03 | End: 2018-10-04

## 2018-10-03 RX ADMIN — NYSTATIN CREAM 1 APPLICATION(S): 100000 CREAM TOPICAL at 12:05

## 2018-10-03 RX ADMIN — HUMAN INSULIN 8 UNIT(S): 100 INJECTION, SUSPENSION SUBCUTANEOUS at 06:43

## 2018-10-03 RX ADMIN — Medication 0.5 MILLIGRAM(S): at 12:05

## 2018-10-03 RX ADMIN — Medication 1 APPLICATION(S): at 17:50

## 2018-10-03 RX ADMIN — Medication 1.5 MILLIGRAM(S): at 21:43

## 2018-10-03 RX ADMIN — MINOCYCLINE HYDROCHLORIDE 100 MILLIGRAM(S): 45 TABLET, EXTENDED RELEASE ORAL at 06:21

## 2018-10-03 RX ADMIN — Medication 500000 UNIT(S): at 17:50

## 2018-10-03 RX ADMIN — MIRTAZAPINE 7.5 MILLIGRAM(S): 45 TABLET, ORALLY DISINTEGRATING ORAL at 21:43

## 2018-10-03 RX ADMIN — SERTRALINE 50 MILLIGRAM(S): 25 TABLET, FILM COATED ORAL at 12:05

## 2018-10-03 RX ADMIN — PANTOPRAZOLE SODIUM 40 MILLIGRAM(S): 20 TABLET, DELAYED RELEASE ORAL at 12:05

## 2018-10-03 RX ADMIN — CHLORHEXIDINE GLUCONATE 1 APPLICATION(S): 213 SOLUTION TOPICAL at 06:20

## 2018-10-03 RX ADMIN — Medication 500000 UNIT(S): at 12:06

## 2018-10-03 RX ADMIN — Medication 650 MILLIGRAM(S): at 16:00

## 2018-10-03 RX ADMIN — MEN-PHOR 1 APPLICATION(S): .5; .5 LOTION TOPICAL at 21:43

## 2018-10-03 RX ADMIN — Medication 650 MILLIGRAM(S): at 02:43

## 2018-10-03 RX ADMIN — Medication 0.5 MILLIGRAM(S): at 00:27

## 2018-10-03 RX ADMIN — HYDROMORPHONE HYDROCHLORIDE 1 MILLIGRAM(S): 2 INJECTION INTRAMUSCULAR; INTRAVENOUS; SUBCUTANEOUS at 11:30

## 2018-10-03 RX ADMIN — LIDOCAINE 1 PATCH: 4 CREAM TOPICAL at 11:11

## 2018-10-03 RX ADMIN — ERGOCALCIFEROL 8000 UNIT(S): 1.25 CAPSULE ORAL at 15:06

## 2018-10-03 RX ADMIN — Medication 1 DROP(S): at 06:20

## 2018-10-03 RX ADMIN — Medication 0.5 MILLIGRAM(S): at 15:07

## 2018-10-03 RX ADMIN — Medication 1 DROP(S): at 15:06

## 2018-10-03 RX ADMIN — ENOXAPARIN SODIUM 40 MILLIGRAM(S): 100 INJECTION SUBCUTANEOUS at 15:07

## 2018-10-03 RX ADMIN — HYDROMORPHONE HYDROCHLORIDE 1 MILLIGRAM(S): 2 INJECTION INTRAMUSCULAR; INTRAVENOUS; SUBCUTANEOUS at 03:20

## 2018-10-03 RX ADMIN — Medication 500000 UNIT(S): at 06:22

## 2018-10-03 RX ADMIN — HYDROMORPHONE HYDROCHLORIDE 1 MILLIGRAM(S): 2 INJECTION INTRAMUSCULAR; INTRAVENOUS; SUBCUTANEOUS at 02:51

## 2018-10-03 RX ADMIN — NYSTATIN CREAM 1 APPLICATION(S): 100000 CREAM TOPICAL at 06:20

## 2018-10-03 RX ADMIN — LIDOCAINE 1 PATCH: 4 CREAM TOPICAL at 21:44

## 2018-10-03 RX ADMIN — Medication 1 DROP(S): at 21:43

## 2018-10-03 RX ADMIN — Medication 650 MILLIGRAM(S): at 00:27

## 2018-10-03 RX ADMIN — MUPIROCIN 1 APPLICATION(S): 20 OINTMENT TOPICAL at 17:50

## 2018-10-03 RX ADMIN — Medication 300 MILLIGRAM(S): at 15:06

## 2018-10-03 RX ADMIN — MINOCYCLINE HYDROCHLORIDE 100 MILLIGRAM(S): 45 TABLET, EXTENDED RELEASE ORAL at 17:50

## 2018-10-03 RX ADMIN — MUPIROCIN 1 APPLICATION(S): 20 OINTMENT TOPICAL at 06:21

## 2018-10-03 RX ADMIN — Medication 0.5 MILLIGRAM(S): at 00:45

## 2018-10-03 RX ADMIN — Medication 650 MILLIGRAM(S): at 15:07

## 2018-10-03 RX ADMIN — Medication 7.5 MILLIGRAM(S): at 06:21

## 2018-10-03 RX ADMIN — HYDROMORPHONE HYDROCHLORIDE 1 MILLIGRAM(S): 2 INJECTION INTRAMUSCULAR; INTRAVENOUS; SUBCUTANEOUS at 11:04

## 2018-10-03 NOTE — PROGRESS NOTE ADULT - PROBLEM SELECTOR PLAN 3
care as per psychiatry   to change and see if new medication will work   dementia is hampering the situation
seems to have resolved for now
ortho following
improving   renal following
Metabolic acidosis: non gap with a positive urine gap  Arterial PH is ok due to respiratory compensation    PIOTR +, Complements: WNL, SIFE/Immunoglobulin light chains: WNL ,Check  Hg electrophoresis, Sjogrens antibodies to rule out a partial distal RTA  can start K citrate 10meq BID for now
continue anxiolytic meds   continue zoloft and remeron
repeat blood culture  monitor cbc   repeat ua and urine c and s   on antibiotics
Metabolic acidosis: non gap with a positive urine gap  Arterial PH is ok due to respiratory compensation    PIOTR +, Complements: WNL, SIFE/Immunoglobulin light chains: WNL ,Check  Hg electrophoresis, Sjogrens antibodies to rule out a partial distal RTA  can start K citrate 10meq BID for now
as above
contieu iv abx   has 12 more days   id following
continue anxiolytics and antidepressants
continue peg fees   consider changing to low protein diet
continue to correct  electrolytes where necessary
continue to follow  labs   care per sicu
has peg   needs binder to the peg area
has spacer placed orthopedic following
id following   continue iv abx   has picc line
monitor glucose and blood level
on insulin . as per endocrinology continue to monitor   patient of prednisone for chronic copd
orthopedics following
plastics following  on wound vac
still with elevated wbc   continue iv fluids and current treatment
to be restarted on klonopin and continue zoloft
to continue meds prn
was hypoglycemic and is on pressors and iv fluids
add one dose extra of klonopin 1 mg at 6 pm to help with the sundowning   otherwise continue seroquel  klonopin and remeron
as above . sodium has improved
care per orthopedics  pt as per orthopedics
care per primary team   harware right hip changed
continue   zoloft , and remeron and klonopin and seroquel
continue Klonopin and seroquel and Remeron and zoloft
continue abx id following   monitor cbc daily
continue iv abx
continue iv abx
continue meds   klonopin, seroquel, remeron , zoloft   has been less anxious
continue meds as per sicu
contiue electrolyte replacement   iv fluids
contiue iv abx   wbc improving restaart po iron
contiue wound care   as per plastics
has been improved   does have bouts of restlessness
has been improving   renal following
has been stable on xoloft remeron seroquel and klonopin
has improved wbc better on multiple abx   id following
healing
id following
id following   off abx   will continue to monitor
improving
mbs at a later eriberto e  continue peg feed
mbs at out patiernt continue peg feed
mbs in am
mbs schedules fro today
monitor glucose   insulin on board
on klonopin seroquel zoloft and remeron
ortho continuing to follow and attending to dressing and wound
ortho following
orthopedics following
pain medication and pt and supportive care
peg to gravity
ratna danielle   completed course of abxx
renal following   bun  also has improved
scheduled for barium swallow next week
sodium improved   electrolytes corrected
speech and swallow scheduled for repeat test
wound healing well
wound vac noted to right leg plastics following
Metabolic acidosis: non gap with a positive urine gap  Arterial PH is ok due to respiratory compensation    Check PIOTR, Complements, SIFE/Immunoglobulin light chains, Hg electrophoresis, Sjogrens antibodies to rule out a partial distal RTA  can start K citrate 10meq BID for now
has peg in pace . family knows that this can be reversible

## 2018-10-03 NOTE — PROGRESS NOTE ADULT - SUBJECTIVE AND OBJECTIVE BOX
CC: f/u for  mrsa bacteremia and septic hip  Patient reports  she is having back pain  REVIEW OF SYSTEMS:  All other review of systems negative (Comprehensive ROS)    Antimicrobials Day #  :  minocycline 100 milliGRAM(s) Oral two times a day  nystatin    Suspension 103388 Unit(s) Oral four times a day    Other Medications Reviewed    T(F): 97.7 (10-03-18 @ 06:18), Max: 97.7 (10-03-18 @ 06:18)  HR: 84 (10-03-18 @ 06:18)  BP: 135/86 (10-03-18 @ 06:18)  RR: 17 (10-03-18 @ 06:18)  SpO2: 97% (10-03-18 @ 06:18)  Wt(kg): --    PHYSICAL EXAM:  General: alert, no acute distress  Eyes:  anicteric, no conjunctival injection, no discharge  Oropharynx: no lesions or injection 	  Neck: supple, without adenopathy  Lungs: clear to auscultation  Heart: regular rate and rhythm; no murmur, rubs or gallops  Abdomen: soft, nondistended, nontender, without mass or organomegaly  Skin: no lesions  Extremities: right hip wound is clean, left hip area mild edema  Neurologic: alert, moves all extremities    LAB RESULTS:                        8.9    8.66  )-----------( 350      ( 03 Oct 2018 07:43 )             26.9     10-02    132<L>  |  93<L>  |  30<H>  ----------------------------<  116<H>  3.9   |  27  |  0.52    Ca    9.5      02 Oct 2018 06:55          Assessment:  Patient with dementia, mrsa bacteremia from infected thr, leisa, spacer, prolonged post op course required peg now on suppressive minocycline doing fairly well, being readied for subacute rehab  Plan:  continue minocycline indefinitely

## 2018-10-03 NOTE — PROGRESS NOTE ADULT - PROBLEM SELECTOR PROBLEM 2
Other dysphagia
Hypokalemia
Pelvic fracture
Pelvic fracture
Acute hypernatremia
Anxiety
Dysphagia, unspecified type
Dysphonia
Hyperglycemia, drug-induced
Hypokalemia
Pelvic fracture
Prerenal azotemia
Prerenal azotemia
Sepsis due to methicillin susceptible Staphylococcus aureus
Sepsis due to methicillin susceptible Staphylococcus aureus
Toxic metabolic encephalopathy
Wound dehiscence
Wound dehiscence
Acute hypernatremia
Anxiety
Closed displaced fracture of acetabulum with routine healing, unspecified portion of acetabulum, unspecified laterality, subsequent encounter
Dysphagia, unspecified type
Dysphagia, unspecified type
Hip fracture, left, sequela
Hyperglycemia, drug-induced
Hypokalemia
Hyponatremia
Hyponatremia
Infection of prosthetic joint, initial encounter
Pelvic fracture
Prerenal azotemia
Sepsis
Sepsis due to methicillin resistant Staphylococcus aureus (MRSA)
Sepsis due to methicillin susceptible Staphylococcus aureus
Wound dehiscence
Hip fracture, left, sequela
Hypokalemia
Electrolyte abnormality
Anxiety
Dysphonia
Wound dehiscence

## 2018-10-03 NOTE — PROGRESS NOTE ADULT - SUBJECTIVE AND OBJECTIVE BOX
HPI:  Per patient's daughter at bedside, she continues to be agitated, increasingly paranoid, and in pain.  At the time of my visit, she is resting comfortably.    Review Of Systems:   No reliable review of systems can be obtained due to dementia    Medications:  acetaminophen    Suspension .. 650 milliGRAM(s) Oral every 8 hours PRN  acetaminophen  IVPB .. 1000 milliGRAM(s) IV Intermittent every 8 hours PRN  AQUAPHOR (petrolatum Ointment) 1 Application(s) Topical daily  artificial tears (preservative free) Ophthalmic Solution 1 Drop(s) Both EYES three times a day  buDESOnide   0.5 milliGRAM(s) Respule 0.5 milliGRAM(s) Inhalation every 12 hours  camphor 0.5%/menthol 0.5% Topical Lotion 1 Application(s) Topical daily  chlorhexidine 4% Liquid 1 Application(s) Topical <User Schedule>  clonazePAM Tablet 1.5 milliGRAM(s) Oral <User Schedule>  dextrose 40% Gel 15 Gram(s) Oral once PRN  dextrose 5%. 1000 milliLiter(s) IV Continuous <Continuous>  dextrose 50% Injectable 12.5 Gram(s) IV Push once  dextrose 50% Injectable 25 Gram(s) IV Push once  dextrose 50% Injectable 25 Gram(s) IV Push once  enoxaparin Injectable 40 milliGRAM(s) SubCutaneous daily  ergocalciferol Drops 8000 Unit(s) Oral daily  fentaNYL   Patch  12 MICROgram(s)/Hr 1 Patch Transdermal every 72 hours  ferrous    sulfate Liquid 300 milliGRAM(s) Enteral Tube daily  glucagon  Injectable 1 milliGRAM(s) IntraMuscular once PRN  HYDROmorphone  Injectable 1 milliGRAM(s) IV Push every 6 hours PRN  insulin lispro (HumaLOG) corrective regimen sliding scale   SubCutaneous every 6 hours  insulin NPH human recombinant 8 Unit(s) SubCutaneous <User Schedule>  lidocaine   Patch 1 Patch Transdermal every 24 hours  LORazepam     Tablet 0.5 milliGRAM(s) Oral every 12 hours PRN  minocycline 100 milliGRAM(s) Oral two times a day  mirtazapine 7.5 milliGRAM(s) Oral at bedtime  mupirocin 2% Ointment 1 Application(s) Topical two times a day  nystatin    Suspension 997182 Unit(s) Oral four times a day  nystatin Powder 1 Application(s) Topical two times a day PRN  pantoprazole  Injectable 40 milliGRAM(s) IV Push every 24 hours  predniSONE   Tablet 7.5 milliGRAM(s) Oral <User Schedule>  sertraline 50 milliGRAM(s) Oral daily    PAST MEDICAL & SURGICAL HISTORY:  CVA (cerebral vascular accident)  Fatty pancreas  PNA (pneumonia)  Pulmonary HTN  IGT (impaired glucose tolerance)  Ulcerative colitis  Acid reflux  Anxiety  Depression  Mouth sores  HLD (hyperlipidemia)  Asthma  Humeral head fracture  H/O: hysterectomy  H/O cataract extraction, left  History of knee replacement    Vitals:  T(C): 36.5 (10-03-18 @ 06:18), Max: 36.5 (10-03-18 @ 06:18)  HR: 84 (10-03-18 @ 06:18) (84 - 96)  BP: 135/86 (10-03-18 @ 06:18) (132/78 - 150/75)  BP(mean): --  RR: 17 (10-03-18 @ 06:18) (17 - 18)  SpO2: 97% (10-03-18 @ 06:18) (96% - 98%)  Wt(kg): --  Daily     Daily Weight in k.2 (03 Oct 2018 06:37)  I&O's Summary    02 Oct 2018 07:01  -  03 Oct 2018 07:00  --------------------------------------------------------  IN: 1805 mL / OUT: 775 mL / NET: 1030 mL        Physical Exam:  Appearance: Frail elderly who appears older than stated age; no acute distress  Eyes: PERRLA, EOMI, pink conjunctiva, no scleral icterus   HENT: Normal oral mucosa  Cardiovascular: RRR, S1, S2, no murmur, rub, or gallop; no edema; no JVD;  Respiratory: Clear to auscultation bilaterally  Gastrointestinal: Soft, non-tender, non-distended, +PEG  Musculoskeletal: No clubbing or joint deformity   Neurologic: No focal weakness noted  Lymphatic: No lymphadenopathy  Psychiatry: awake and alert  Skin: No rashes, ecchymoses, or cyanosis                          8.9    8.66  )-----------( 350      ( 03 Oct 2018 07:43 )             26.9     10-02    132<L>  |  93<L>  |  30<H>  ----------------------------<  116<H>  3.9   |  27  |  0.52    Ca    9.5      02 Oct 2018 06:55          ECG: sinus at 93 bpm with normal QT but prolonged QTc    Echo: < from: Transesophageal Echocardiogram (18 @ 16:42) >  ------------------------------------------------------------------------  Conclusions:  1. Tethered , mildly calcified mitral valve leaflets with  normal opening. Mild-moderate mitral regurgitation.  2. Sclerotic aortic valve leaflets with normal opening.  Mild aortic regurgitation.  3. Severe global left ventricular systolic dysfunction.  4. Right ventricular enlargement with decreased right  ventricular systolic function.  5. Thickened tricuspid valve. Mild-moderate tricuspid  regurgitation.  6. Color Doppler demonstrates evidence of a patent foramen  ovale.  7. No evidence of valvular vegetation is seen.  ------------------------------------------------------------------------  Confirmed on  2018 - 15:06:58 by Margaret Khan M.D.  ------------------------------------------------------------------------    < end of copied text >    < from: TTE with Doppler (w/Cont) (18 @ 20:12) >  ------------------------------------------------------------------------  Conclusions:  1. Endocardial visualization enhanced with intravenous  injection of Ultrasonic Enhancing Agent (Definity). Left  ventricle suboptimally visualized despite the use of  intravenous echo contrast; overall normal left ventricular  systolic function. The mid inferoseptum appears hypokinetic  on certain views.  2. The right ventricle is notwell visualized; grossly  normal right ventricular size and systolic function.  *** Compared with echocardiogram of 2018, there has  been improvement of LV and RV systolic function.  ------------------------------------------------------------------------  Confirmed on  2018 - 12:38:38 by Belkys Altman M.D.  ------------------------------------------------------------------------    < end of copied text >

## 2018-10-03 NOTE — PROGRESS NOTE ADULT - PROBLEM SELECTOR PROBLEM 3
Pelvic fracture
Prerenal azotemia
Anxiety
Metabolic acidosis, NAG, bicarbonate losses
Fever, unspecified fever cause
Anxiety
Dysphagia
Dysphonia
Electrolyte abnormality
Electrolyte abnormality
Hyperglycemia, drug-induced
IGT (impaired glucose tolerance)
Metabolic acidosis, NAG, bicarbonate losses
Pelvic fracture
Pelvic fracture
Sepsis due to methicillin susceptible Staphylococcus aureus
Toxic metabolic encephalopathy
Toxic metabolic encephalopathy
Wound dehiscence
Acute hypernatremia
Anxiety
Dysphagia, unspecified type
Dysphonia
Hip fracture, left, sequela
Hypernatremia
Hyponatremia
IGT (impaired glucose tolerance)
Metabolic acidosis with increased anion gap and accumulation of organic acids
Pelvic fracture
Prerenal azotemia
Prerenal azotemia
Sepsis due to methicillin resistant Staphylococcus aureus (MRSA)
Sepsis due to methicillin resistant Staphylococcus aureus (MRSA)
Sepsis due to methicillin susceptible Staphylococcus aureus
Toxic metabolic encephalopathy
Wound dehiscence
Metabolic acidosis, NAG, bicarbonate losses
Anxiety
Fever, unspecified fever cause
Dysphonia

## 2018-10-03 NOTE — PROGRESS NOTE ADULT - PROBLEM SELECTOR PROBLEM 4
Sepsis due to methicillin susceptible Staphylococcus aureus
Pelvic fracture
Sepsis due to methicillin resistant Staphylococcus aureus (MRSA)
Dysphonia
Anxiety
Dysphonia
Fever, unspecified fever cause
Hyperglycemia, drug-induced
Troponin level elevated
Anxiety
Asthma
Dysphonia
Hip fracture, left, sequela
Hip fracture, left, sequela
Hyperglycemia, drug-induced
Hyperglycemia, drug-induced
Pelvic fracture
Sepsis due to methicillin resistant Staphylococcus aureus (MRSA)
Sepsis due to methicillin susceptible Staphylococcus aureus
Severe sepsis
Toxic metabolic encephalopathy
Wound dehiscence
Wound dehiscence
Hyperglycemia, drug-induced

## 2018-10-03 NOTE — PROGRESS NOTE ADULT - ASSESSMENT
68 year-old woman with Alzheimer's dementia seen to have episode of periprocedural anxiety, shortness of breath, and chest discomfort that resolved spontaneously and has not recurred.  Her most recent TTE shows interval improvement in LV systolic function.    QT is normal (QTc prolonged in setting of sinus tachycardia).  Risk/benefit in favor of giving Seroquel if recommended by psychiatry for behavioral disturbance.     Discharge planning to rehab per primary team.  Please reconsult for any further cardiovascular concerns.

## 2018-10-03 NOTE — PROGRESS NOTE ADULT - SUBJECTIVE AND OBJECTIVE BOX
---___---___---___---___---___---___ ---___---___---___---___---___---___---___---___---___---                    <<<  M E D I C A L   A T T E N D I N G    F O L L O W    U P   N O T E  >>>    - awaitng placement at nursing home meds  changed and still has agitation at night and sleeping during the day    ---___---___---___---___---___---      <<<  MEDICATIONS:  >>>    MEDICATIONS  (STANDING):  artificial tears (preservative free) Ophthalmic Solution 1 Drop(s) Both EYES three times a day  buDESOnide   0.5 milliGRAM(s) Respule 0.5 milliGRAM(s) Inhalation every 12 hours  chlorhexidine 4% Liquid 1 Application(s) Topical <User Schedule>  clonazePAM Tablet 1.5 milliGRAM(s) Oral <User Schedule>  dextrose 5%. 1000 milliLiter(s) (50 mL/Hr) IV Continuous <Continuous>  dextrose 50% Injectable 12.5 Gram(s) IV Push once  dextrose 50% Injectable 25 Gram(s) IV Push once  dextrose 50% Injectable 25 Gram(s) IV Push once  enoxaparin Injectable 40 milliGRAM(s) SubCutaneous daily  ergocalciferol Drops 8000 Unit(s) Oral daily  fentaNYL   Patch  12 MICROgram(s)/Hr 1 Patch Transdermal every 72 hours  ferrous    sulfate Liquid 300 milliGRAM(s) Enteral Tube daily  insulin lispro (HumaLOG) corrective regimen sliding scale   SubCutaneous every 6 hours  insulin NPH human recombinant 8 Unit(s) SubCutaneous <User Schedule>  lidocaine   Patch 1 Patch Transdermal every 24 hours  minocycline 100 milliGRAM(s) Oral two times a day  mirtazapine 7.5 milliGRAM(s) Oral at bedtime  mupirocin 2% Ointment 1 Application(s) Topical two times a day  nystatin    Suspension 480426 Unit(s) Oral four times a day  pantoprazole  Injectable 40 milliGRAM(s) IV Push every 24 hours  predniSONE   Tablet 7.5 milliGRAM(s) Oral <User Schedule>  sertraline 50 milliGRAM(s) Oral daily      MEDICATIONS  (PRN):  acetaminophen    Suspension .. 650 milliGRAM(s) Oral every 8 hours PRN Mild Pain (1 - 3)  acetaminophen  IVPB .. 1000 milliGRAM(s) IV Intermittent every 8 hours PRN Mild Pain (1 - 3), Moderate Pain (4 - 6), Severe Pain (7 - 10)  dextrose 40% Gel 15 Gram(s) Oral once PRN Blood Glucose LESS THAN 70 milliGRAM(s)/deciLiter  glucagon  Injectable 1 milliGRAM(s) IntraMuscular once PRN Glucose <70 milliGRAM(s)/deciLiter  HYDROmorphone  Injectable 1 milliGRAM(s) IV Push every 6 hours PRN Severe Pain (7 - 10)  LORazepam     Tablet 0.5 milliGRAM(s) Oral every 12 hours PRN Agitation  nystatin Powder 1 Application(s) Topical two times a day PRN rash/itchiness       ---___---___---___---___---___---     <<<REVIEW OF SYSTEM: >>>    GEN: no fever, no chills, no pain  RESP: no SOB, no cough, no sputum  CVS: no chest pain, no palpitations, no edema  GI: no abdominal pain, no nausea, no vomiting, no constipation, no diarrhea  : no dysurea, no frequency  NEURO: no headache, no dizziness  PSYCH: no depression, not anxious  Derm : no itching, no rash     ---___---___---___---___---___---          <<<  VITAL SIGNS: >>>    T(F): 97.7 (10-03-18 @ 06:18), Max: 98.7 (10-02-18 @ 13:05)  HR: 84 (10-03-18 @ 06:18) (84 - 973)  BP: 135/86 (10-03-18 @ 06:18) (102/65 - 150/75)  RR: 17 (10-03-18 @ 06:18) (17 - 18)  SpO2: 97% (10-03-18 @ 06:18) (92% - 100%)  Wt(kg): --  CAPILLARY BLOOD GLUCOSE  136 (03 Oct 2018 06:25)      POCT Blood Glucose.: 98 mg/dL (02 Oct 2018 17:17)    I&O's Summary    02 Oct 2018 07:01  -  03 Oct 2018 07:00  --------------------------------------------------------  IN: 1805 mL / OUT: 775 mL / NET: 1030 mL         ---___---___---___---___---___---                       PHYSICAL EXAM:    GEN: A&O X 1 , NAD , comfortable  HEENT: NCAT, PERRL, MMM, no scleral icterus, hearing intact  NECK: Supple, No JVD  CVS: S1S2 , regular , No M/R/G appreciated  PULM: CTA B/L,  no W/R/R appreciated  ABD.: soft. non tender, non distended,  bowel sounds present peg noted   Extrem: intact pulses , no edema noted  Derm: No rash or ecchymosis noted  PSYCH: normal mood, no depression, not anxious     ---___---___---___---___---___---     <<<  LAB AND IMAGING: >>>                          8.9    8.66  )-----------( 350      ( 03 Oct 2018 07:43 )             26.9                 10-02    132<L>  |  93<L>  |  30<H>  ----------------------------<  116<H>  3.9   |  27  |  0.52    Ca    9.5      02 Oct 2018 06:55                                 [All pertinent / recent available Imaging reports and other labs reviewed]     ---___---___---___---___---___---___ ---___---___---___---___---           <<<  A S S E S S M E N T   A N D   P L A N :  >>>          -GI/DVT Prophylaxis.    --------------------------------------------  Case discussed with   Education given on     >>______________________<<      Deniz Bolden .         phone   [phone]

## 2018-10-03 NOTE — PROGRESS NOTE ADULT - PROBLEM SELECTOR PLAN 1
pain management   family states that  she has medical marijuana at home . must bring card jerel is active and nic med with instructions   continue dilaudi and fentanyl

## 2018-10-03 NOTE — PROGRESS NOTE ADULT - PROBLEM/PLAN-1
DISPLAY PLAN FREE TEXT

## 2018-10-03 NOTE — PROGRESS NOTE ADULT - PROBLEM SELECTOR PROBLEM 1
Sepsis due to methicillin susceptible Staphylococcus aureus
Anxiety
Fever, unspecified fever cause
Hip fracture, left, sequela
Hyperglycemia, drug-induced
Hypernatremia
Hyponatremia
Infection of prosthetic joint, initial encounter
Pelvic fracture
Pelvic fracture
Prerenal azotemia
Sepsis
Sepsis due to methicillin resistant Staphylococcus aureus (MRSA)
Sepsis due to methicillin susceptible Staphylococcus aureus
Severe sepsis
Wound dehiscence
Acute hypernatremia
Anxiety
Anxiety
Closed nondisplaced fracture of acetabulum, unspecified portion of acetabulum, unspecified laterality, sequela
Closed nondisplaced fracture of acetabulum, unspecified portion of acetabulum, unspecified laterality, sequela
Dysphagia, unspecified type
Dysphonia
Dysphonia
Hip fracture, left, sequela
Hyperglycemia, drug-induced
Hypernatremia
Hyponatremia
IGT (impaired glucose tolerance)
Infection of prosthetic joint, initial encounter
Metabolic acidosis, NAG, bicarbonate losses
Other dysphagia
Pelvic fracture
Prerenal azotemia
Sepsis due to methicillin susceptible Staphylococcus aureus
Sepsis, due to unspecified organism
Sepsis, due to unspecified organism
Toxic metabolic encephalopathy
Wound dehiscence
Wound dehiscence
Hyperglycemia, drug-induced
Hyperglycemia, drug-induced
Hyponatremia
Sepsis
Closed nondisplaced fracture of acetabulum with delayed healing, unspecified portion of acetabulum, unspecified laterality, subsequent encounter
Sepsis due to methicillin susceptible Staphylococcus aureus
Hyponatremia
Infection of prosthetic joint, initial encounter
Sepsis due to methicillin susceptible Staphylococcus aureus
Closed nondisplaced fracture of acetabulum, unspecified portion of acetabulum, unspecified laterality, sequela

## 2018-10-03 NOTE — PROGRESS NOTE ADULT - PROVIDER SPECIALTY LIST ADULT
Anesthesia
Anesthesia
Cardiology
Cardiology
Critical Care
Endocrinology
Family Medicine
Gastroenterology
Infectious Disease
Internal Medicine
Intervent Radiology
Nephrology
Orthopedics
Plastic Surgery
Plastic Surgery
Psychiatry
Pulmonology
SICU
Endocrinology
Infectious Disease
Infectious Disease
Pulmonology
SICU
SICU
Cardiology
Gastroenterology
Gastroenterology
Infectious Disease
Orthopedics
Orthopedics
SICU
SICU
Cardiology
Family Medicine
Gastroenterology
Gastroenterology
Infectious Disease
Infectious Disease
SICU
Family Medicine
Family Medicine
Endocrinology
Family Medicine
Nephrology
Family Medicine
Nephrology
Nephrology
Family Medicine

## 2018-10-03 NOTE — PROGRESS NOTE ADULT - REASON FOR ADMISSION
weakness
infection
weakness
RLE wound
weakness

## 2018-10-04 VITALS
OXYGEN SATURATION: 96 % | RESPIRATION RATE: 18 BRPM | DIASTOLIC BLOOD PRESSURE: 79 MMHG | SYSTOLIC BLOOD PRESSURE: 125 MMHG | TEMPERATURE: 98 F | HEART RATE: 92 BPM

## 2018-10-04 LAB
GLUCOSE BLDC GLUCOMTR-MCNC: 113 MG/DL — HIGH (ref 70–99)
GLUCOSE BLDC GLUCOMTR-MCNC: 130 MG/DL — HIGH (ref 70–99)
GLUCOSE BLDC GLUCOMTR-MCNC: 149 MG/DL — HIGH (ref 70–99)

## 2018-10-04 RX ORDER — BUDESONIDE, MICRONIZED 100 %
2 POWDER (GRAM) MISCELLANEOUS
Qty: 0 | Refills: 0 | COMMUNITY
Start: 2018-10-04

## 2018-10-04 RX ORDER — ACETAMINOPHEN 500 MG
20.31 TABLET ORAL
Qty: 0 | Refills: 0 | COMMUNITY
Start: 2018-10-04

## 2018-10-04 RX ORDER — QUETIAPINE FUMARATE 200 MG/1
25 TABLET, FILM COATED ORAL AT BEDTIME
Qty: 0 | Refills: 0 | Status: DISCONTINUED | OUTPATIENT
Start: 2018-10-04 | End: 2018-10-04

## 2018-10-04 RX ORDER — MEN-PHOR .5; .5 G/G; G/G
1 LOTION TOPICAL
Qty: 0 | Refills: 0 | COMMUNITY
Start: 2018-10-04

## 2018-10-04 RX ORDER — HYDROMORPHONE HYDROCHLORIDE 2 MG/ML
1 INJECTION INTRAMUSCULAR; INTRAVENOUS; SUBCUTANEOUS
Qty: 0 | Refills: 0 | COMMUNITY
Start: 2018-10-04

## 2018-10-04 RX ORDER — MIRTAZAPINE 45 MG/1
1 TABLET, ORALLY DISINTEGRATING ORAL
Qty: 0 | Refills: 0 | COMMUNITY
Start: 2018-10-04

## 2018-10-04 RX ORDER — HUMAN INSULIN 100 [IU]/ML
8 INJECTION, SUSPENSION SUBCUTANEOUS
Qty: 0 | Refills: 0 | COMMUNITY
Start: 2018-10-04

## 2018-10-04 RX ORDER — FENTANYL CITRATE 50 UG/ML
1 INJECTION INTRAVENOUS
Qty: 0 | Refills: 0 | COMMUNITY
Start: 2018-10-04

## 2018-10-04 RX ORDER — ACETAMINOPHEN 500 MG
3 TABLET ORAL
Qty: 0 | Refills: 0 | COMMUNITY

## 2018-10-04 RX ORDER — INFLUENZA VIRUS VACCINE 15; 15; 15; 15 UG/.5ML; UG/.5ML; UG/.5ML; UG/.5ML
0.5 SUSPENSION INTRAMUSCULAR ONCE
Qty: 0 | Refills: 0 | Status: COMPLETED | OUTPATIENT
Start: 2018-10-04 | End: 2018-10-04

## 2018-10-04 RX ORDER — CLONAZEPAM 1 MG
2 TABLET ORAL
Qty: 0 | Refills: 0 | COMMUNITY

## 2018-10-04 RX ORDER — PETROLATUM,WHITE
1 JELLY (GRAM) TOPICAL
Qty: 0 | Refills: 0 | COMMUNITY
Start: 2018-10-04

## 2018-10-04 RX ORDER — QUETIAPINE FUMARATE 200 MG/1
1 TABLET, FILM COATED ORAL
Qty: 0 | Refills: 0 | COMMUNITY
Start: 2018-10-04

## 2018-10-04 RX ORDER — CLONAZEPAM 1 MG
3 TABLET ORAL
Qty: 0 | Refills: 0 | COMMUNITY
Start: 2018-10-04

## 2018-10-04 RX ORDER — ERGOCALCIFEROL 1.25 MG/1
8000 CAPSULE ORAL
Qty: 0 | Refills: 0 | COMMUNITY
Start: 2018-10-04

## 2018-10-04 RX ORDER — TRIAMTERENE/HYDROCHLOROTHIAZID 75 MG-50MG
1 TABLET ORAL
Qty: 0 | Refills: 0 | COMMUNITY

## 2018-10-04 RX ORDER — HUMAN INSULIN 100 [IU]/ML
6 INJECTION, SUSPENSION SUBCUTANEOUS
Qty: 0 | Refills: 0 | COMMUNITY
Start: 2018-10-04

## 2018-10-04 RX ORDER — MUPIROCIN 20 MG/G
1 OINTMENT TOPICAL
Qty: 0 | Refills: 0 | COMMUNITY
Start: 2018-10-04

## 2018-10-04 RX ORDER — HYDROMORPHONE HYDROCHLORIDE 2 MG/ML
2 INJECTION INTRAMUSCULAR; INTRAVENOUS; SUBCUTANEOUS EVERY 4 HOURS
Qty: 0 | Refills: 0 | Status: DISCONTINUED | OUTPATIENT
Start: 2018-10-04 | End: 2018-10-04

## 2018-10-04 RX ORDER — FERROUS SULFATE 325(65) MG
5 TABLET ORAL
Qty: 0 | Refills: 0 | COMMUNITY
Start: 2018-10-04

## 2018-10-04 RX ORDER — PANTOPRAZOLE SODIUM 20 MG/1
40 TABLET, DELAYED RELEASE ORAL DAILY
Qty: 0 | Refills: 0 | Status: DISCONTINUED | OUTPATIENT
Start: 2018-10-04 | End: 2018-10-04

## 2018-10-04 RX ORDER — MINOCYCLINE HYDROCHLORIDE 45 MG/1
1 TABLET, EXTENDED RELEASE ORAL
Qty: 0 | Refills: 0 | COMMUNITY
Start: 2018-10-04

## 2018-10-04 RX ADMIN — ERGOCALCIFEROL 8000 UNIT(S): 1.25 CAPSULE ORAL at 12:14

## 2018-10-04 RX ADMIN — Medication 500000 UNIT(S): at 06:18

## 2018-10-04 RX ADMIN — Medication 7.5 MILLIGRAM(S): at 06:19

## 2018-10-04 RX ADMIN — SERTRALINE 50 MILLIGRAM(S): 25 TABLET, FILM COATED ORAL at 12:13

## 2018-10-04 RX ADMIN — Medication 300 MILLIGRAM(S): at 12:14

## 2018-10-04 RX ADMIN — Medication 1 DROP(S): at 13:26

## 2018-10-04 RX ADMIN — HYDROMORPHONE HYDROCHLORIDE 2 MILLIGRAM(S): 2 INJECTION INTRAMUSCULAR; INTRAVENOUS; SUBCUTANEOUS at 10:22

## 2018-10-04 RX ADMIN — LIDOCAINE 1 PATCH: 4 CREAM TOPICAL at 10:23

## 2018-10-04 RX ADMIN — MUPIROCIN 1 APPLICATION(S): 20 OINTMENT TOPICAL at 06:20

## 2018-10-04 RX ADMIN — NYSTATIN CREAM 1 APPLICATION(S): 100000 CREAM TOPICAL at 12:14

## 2018-10-04 RX ADMIN — HYDROMORPHONE HYDROCHLORIDE 1 MILLIGRAM(S): 2 INJECTION INTRAMUSCULAR; INTRAVENOUS; SUBCUTANEOUS at 01:08

## 2018-10-04 RX ADMIN — FENTANYL CITRATE 1 PATCH: 50 INJECTION INTRAVENOUS at 12:13

## 2018-10-04 RX ADMIN — CHLORHEXIDINE GLUCONATE 1 APPLICATION(S): 213 SOLUTION TOPICAL at 06:20

## 2018-10-04 RX ADMIN — PANTOPRAZOLE SODIUM 40 MILLIGRAM(S): 20 TABLET, DELAYED RELEASE ORAL at 12:13

## 2018-10-04 RX ADMIN — ENOXAPARIN SODIUM 40 MILLIGRAM(S): 100 INJECTION SUBCUTANEOUS at 12:13

## 2018-10-04 RX ADMIN — Medication 0.5 MILLIGRAM(S): at 12:14

## 2018-10-04 RX ADMIN — Medication 500000 UNIT(S): at 00:30

## 2018-10-04 RX ADMIN — Medication 1 DROP(S): at 06:20

## 2018-10-04 RX ADMIN — HUMAN INSULIN 8 UNIT(S): 100 INJECTION, SUSPENSION SUBCUTANEOUS at 06:19

## 2018-10-04 RX ADMIN — Medication 0.5 MILLIGRAM(S): at 00:30

## 2018-10-04 RX ADMIN — Medication 1 APPLICATION(S): at 12:14

## 2018-10-04 RX ADMIN — Medication 0: at 06:19

## 2018-10-04 RX ADMIN — HYDROMORPHONE HYDROCHLORIDE 2 MILLIGRAM(S): 2 INJECTION INTRAMUSCULAR; INTRAVENOUS; SUBCUTANEOUS at 11:20

## 2018-10-04 RX ADMIN — INFLUENZA VIRUS VACCINE 0.5 MILLILITER(S): 15; 15; 15; 15 SUSPENSION INTRAMUSCULAR at 13:25

## 2018-10-04 RX ADMIN — Medication 500000 UNIT(S): at 12:14

## 2018-10-04 RX ADMIN — HYDROMORPHONE HYDROCHLORIDE 1 MILLIGRAM(S): 2 INJECTION INTRAMUSCULAR; INTRAVENOUS; SUBCUTANEOUS at 00:38

## 2018-10-04 RX ADMIN — HYDROMORPHONE HYDROCHLORIDE 1 MILLIGRAM(S): 2 INJECTION INTRAMUSCULAR; INTRAVENOUS; SUBCUTANEOUS at 06:32

## 2018-10-04 RX ADMIN — MEN-PHOR 1 APPLICATION(S): .5; .5 LOTION TOPICAL at 12:14

## 2018-10-04 RX ADMIN — MINOCYCLINE HYDROCHLORIDE 100 MILLIGRAM(S): 45 TABLET, EXTENDED RELEASE ORAL at 06:19

## 2018-10-04 RX ADMIN — HYDROMORPHONE HYDROCHLORIDE 1 MILLIGRAM(S): 2 INJECTION INTRAMUSCULAR; INTRAVENOUS; SUBCUTANEOUS at 07:02

## 2018-10-04 RX ADMIN — Medication 0.5 MILLIGRAM(S): at 02:24

## 2018-10-04 NOTE — CHART NOTE - NSCHARTNOTEFT_GEN_A_CORE
Pt medically cleared for discharge to rehab by Dr. Bolden. Medication reconciliation reviewed with attending. Wet to dry dressing on wound, vac will be replaced in rehab. PICC ordered to be removed. Seroquel was restarted as d/w Dr. Bolden.  is aware of risk and benefit of continuing seroquel despite EKG findings and would like to continue this medication. Pt instructed to follow up with PMD, ID, Ortho and Plastics outpatient.

## 2018-11-05 ENCOUNTER — INPATIENT (INPATIENT)
Facility: HOSPITAL | Age: 68
LOS: 13 days | Discharge: HOME | DRG: 177 | End: 2018-11-19
Attending: FAMILY MEDICINE | Admitting: FAMILY MEDICINE
Payer: MEDICARE

## 2018-11-05 VITALS
TEMPERATURE: 98 F | DIASTOLIC BLOOD PRESSURE: 63 MMHG | OXYGEN SATURATION: 93 % | HEART RATE: 103 BPM | SYSTOLIC BLOOD PRESSURE: 107 MMHG | RESPIRATION RATE: 16 BRPM

## 2018-11-05 DIAGNOSIS — Z96.659 PRESENCE OF UNSPECIFIED ARTIFICIAL KNEE JOINT: Chronic | ICD-10-CM

## 2018-11-05 DIAGNOSIS — S42.293A OTHER DISPLACED FRACTURE OF UPPER END OF UNSPECIFIED HUMERUS, INITIAL ENCOUNTER FOR CLOSED FRACTURE: Chronic | ICD-10-CM

## 2018-11-05 DIAGNOSIS — Z98.42 CATARACT EXTRACTION STATUS, LEFT EYE: Chronic | ICD-10-CM

## 2018-11-05 DIAGNOSIS — Z90.710 ACQUIRED ABSENCE OF BOTH CERVIX AND UTERUS: Chronic | ICD-10-CM

## 2018-11-05 PROCEDURE — 99285 EMERGENCY DEPT VISIT HI MDM: CPT | Mod: GC

## 2018-11-06 DIAGNOSIS — F32.9 MAJOR DEPRESSIVE DISORDER, SINGLE EPISODE, UNSPECIFIED: ICD-10-CM

## 2018-11-06 DIAGNOSIS — J69.0 PNEUMONITIS DUE TO INHALATION OF FOOD AND VOMIT: ICD-10-CM

## 2018-11-06 DIAGNOSIS — R41.82 ALTERED MENTAL STATUS, UNSPECIFIED: ICD-10-CM

## 2018-11-06 DIAGNOSIS — J45.909 UNSPECIFIED ASTHMA, UNCOMPLICATED: ICD-10-CM

## 2018-11-06 DIAGNOSIS — G93.41 METABOLIC ENCEPHALOPATHY: ICD-10-CM

## 2018-11-06 DIAGNOSIS — N30.90 CYSTITIS, UNSPECIFIED WITHOUT HEMATURIA: ICD-10-CM

## 2018-11-06 DIAGNOSIS — M25.551 PAIN IN RIGHT HIP: ICD-10-CM

## 2018-11-06 DIAGNOSIS — F41.9 ANXIETY DISORDER, UNSPECIFIED: ICD-10-CM

## 2018-11-06 DIAGNOSIS — K51.80 OTHER ULCERATIVE COLITIS WITHOUT COMPLICATIONS: ICD-10-CM

## 2018-11-06 DIAGNOSIS — G93.40 ENCEPHALOPATHY, UNSPECIFIED: ICD-10-CM

## 2018-11-06 DIAGNOSIS — E87.1 HYPO-OSMOLALITY AND HYPONATREMIA: ICD-10-CM

## 2018-11-06 DIAGNOSIS — I63.9 CEREBRAL INFARCTION, UNSPECIFIED: ICD-10-CM

## 2018-11-06 DIAGNOSIS — I27.20 PULMONARY HYPERTENSION, UNSPECIFIED: ICD-10-CM

## 2018-11-06 LAB
ALBUMIN SERPL ELPH-MCNC: 3 G/DL — LOW (ref 3.3–5)
ALP SERPL-CCNC: 71 U/L — SIGNIFICANT CHANGE UP (ref 40–120)
ALT FLD-CCNC: 7 U/L — LOW (ref 10–45)
ANION GAP SERPL CALC-SCNC: 16 MMOL/L — SIGNIFICANT CHANGE UP (ref 5–17)
APPEARANCE UR: ABNORMAL
APTT BLD: 34.7 SEC — SIGNIFICANT CHANGE UP (ref 27.5–36.3)
AST SERPL-CCNC: 18 U/L — SIGNIFICANT CHANGE UP (ref 10–40)
BACTERIA # UR AUTO: ABNORMAL
BASE EXCESS BLDV CALC-SCNC: 3.9 MMOL/L — HIGH (ref -2–2)
BASOPHILS # BLD AUTO: 0 K/UL — SIGNIFICANT CHANGE UP (ref 0–0.2)
BASOPHILS NFR BLD AUTO: 0.5 % — SIGNIFICANT CHANGE UP (ref 0–2)
BILIRUB SERPL-MCNC: 0.3 MG/DL — SIGNIFICANT CHANGE UP (ref 0.2–1.2)
BILIRUB UR-MCNC: NEGATIVE — SIGNIFICANT CHANGE UP
BUN SERPL-MCNC: 21 MG/DL — SIGNIFICANT CHANGE UP (ref 7–23)
CA-I SERPL-SCNC: 1.22 MMOL/L — SIGNIFICANT CHANGE UP (ref 1.12–1.3)
CALCIUM SERPL-MCNC: 9.2 MG/DL — SIGNIFICANT CHANGE UP (ref 8.4–10.5)
CHLORIDE BLDV-SCNC: 89 MMOL/L — LOW (ref 96–108)
CHLORIDE SERPL-SCNC: 88 MMOL/L — LOW (ref 96–108)
CO2 BLDV-SCNC: 30 MMOL/L — SIGNIFICANT CHANGE UP (ref 22–30)
CO2 SERPL-SCNC: 24 MMOL/L — SIGNIFICANT CHANGE UP (ref 22–31)
COLOR SPEC: YELLOW — SIGNIFICANT CHANGE UP
CREAT SERPL-MCNC: 0.42 MG/DL — LOW (ref 0.5–1.3)
DIFF PNL FLD: ABNORMAL
EOSINOPHIL # BLD AUTO: 0.5 K/UL — SIGNIFICANT CHANGE UP (ref 0–0.5)
EOSINOPHIL NFR BLD AUTO: 4.4 % — SIGNIFICANT CHANGE UP (ref 0–6)
EPI CELLS # UR: 0 /HPF — SIGNIFICANT CHANGE UP
GAS PNL BLDV: 127 MMOL/L — LOW (ref 136–145)
GAS PNL BLDV: SIGNIFICANT CHANGE UP
GAS PNL BLDV: SIGNIFICANT CHANGE UP
GLUCOSE BLDC GLUCOMTR-MCNC: 122 MG/DL — HIGH (ref 70–99)
GLUCOSE BLDC GLUCOMTR-MCNC: 122 MG/DL — HIGH (ref 70–99)
GLUCOSE BLDC GLUCOMTR-MCNC: 175 MG/DL — HIGH (ref 70–99)
GLUCOSE BLDV-MCNC: 107 MG/DL — HIGH (ref 70–99)
GLUCOSE SERPL-MCNC: 104 MG/DL — HIGH (ref 70–99)
GLUCOSE UR QL: NEGATIVE — SIGNIFICANT CHANGE UP
HCO3 BLDV-SCNC: 29 MMOL/L — SIGNIFICANT CHANGE UP (ref 21–29)
HCT VFR BLD CALC: 28.4 % — LOW (ref 34.5–45)
HCT VFR BLDA CALC: 30 % — LOW (ref 39–50)
HGB BLD CALC-MCNC: 9.6 G/DL — LOW (ref 11.5–15.5)
HGB BLD-MCNC: 10.2 G/DL — LOW (ref 11.5–15.5)
HOROWITZ INDEX BLDV+IHG-RTO: SIGNIFICANT CHANGE UP
HYALINE CASTS # UR AUTO: 3 /LPF — HIGH (ref 0–2)
INR BLD: 0.99 RATIO — SIGNIFICANT CHANGE UP (ref 0.88–1.16)
KETONES UR-MCNC: NEGATIVE — SIGNIFICANT CHANGE UP
LACTATE BLDV-MCNC: 2.2 MMOL/L — HIGH (ref 0.7–2)
LEUKOCYTE ESTERASE UR-ACNC: ABNORMAL
LYMPHOCYTES # BLD AUTO: 1 K/UL — SIGNIFICANT CHANGE UP (ref 1–3.3)
LYMPHOCYTES # BLD AUTO: 9 % — LOW (ref 13–44)
MCHC RBC-ENTMCNC: 31.9 PG — SIGNIFICANT CHANGE UP (ref 27–34)
MCHC RBC-ENTMCNC: 35.8 GM/DL — SIGNIFICANT CHANGE UP (ref 32–36)
MCV RBC AUTO: 89.1 FL — SIGNIFICANT CHANGE UP (ref 80–100)
MONOCYTES # BLD AUTO: 0.8 K/UL — SIGNIFICANT CHANGE UP (ref 0–0.9)
MONOCYTES NFR BLD AUTO: 7.5 % — SIGNIFICANT CHANGE UP (ref 2–14)
NEUTROPHILS # BLD AUTO: 8.3 K/UL — HIGH (ref 1.8–7.4)
NEUTROPHILS NFR BLD AUTO: 78.6 % — HIGH (ref 43–77)
NITRITE UR-MCNC: NEGATIVE — SIGNIFICANT CHANGE UP
PCO2 BLDV: 47 MMHG — SIGNIFICANT CHANGE UP (ref 35–50)
PH BLDV: 7.4 — SIGNIFICANT CHANGE UP (ref 7.35–7.45)
PH UR: 7 — SIGNIFICANT CHANGE UP (ref 5–8)
PLATELET # BLD AUTO: 367 K/UL — SIGNIFICANT CHANGE UP (ref 150–400)
PO2 BLDV: <50 MMHG — HIGH (ref 25–45)
POTASSIUM BLDV-SCNC: 3.4 MMOL/L — LOW (ref 3.5–5.3)
POTASSIUM SERPL-MCNC: 3.8 MMOL/L — SIGNIFICANT CHANGE UP (ref 3.5–5.3)
POTASSIUM SERPL-SCNC: 3.8 MMOL/L — SIGNIFICANT CHANGE UP (ref 3.5–5.3)
PROT SERPL-MCNC: 5.9 G/DL — LOW (ref 6–8.3)
PROT UR-MCNC: ABNORMAL
PROTHROM AB SERPL-ACNC: 11.3 SEC — SIGNIFICANT CHANGE UP (ref 10–12.9)
RBC # BLD: 3.19 M/UL — LOW (ref 3.8–5.2)
RBC # FLD: 13.4 % — SIGNIFICANT CHANGE UP (ref 10.3–14.5)
RBC CASTS # UR COMP ASSIST: 14 /HPF — HIGH (ref 0–4)
SAO2 % BLDV: 71 % — SIGNIFICANT CHANGE UP (ref 67–88)
SODIUM SERPL-SCNC: 128 MMOL/L — LOW (ref 135–145)
SP GR SPEC: 1.02 — SIGNIFICANT CHANGE UP (ref 1.01–1.02)
UROBILINOGEN FLD QL: NEGATIVE — SIGNIFICANT CHANGE UP
WBC # BLD: 10.6 K/UL — HIGH (ref 3.8–10.5)
WBC # FLD AUTO: 10.6 K/UL — HIGH (ref 3.8–10.5)
WBC UR QL: 90 /HPF — HIGH (ref 0–5)

## 2018-11-06 PROCEDURE — 99223 1ST HOSP IP/OBS HIGH 75: CPT

## 2018-11-06 PROCEDURE — 71045 X-RAY EXAM CHEST 1 VIEW: CPT | Mod: 26

## 2018-11-06 PROCEDURE — 73552 X-RAY EXAM OF FEMUR 2/>: CPT | Mod: 26,RT

## 2018-11-06 PROCEDURE — 73502 X-RAY EXAM HIP UNI 2-3 VIEWS: CPT | Mod: 26,RT

## 2018-11-06 RX ORDER — GLUCAGON INJECTION, SOLUTION 0.5 MG/.1ML
1 INJECTION, SOLUTION SUBCUTANEOUS ONCE
Qty: 0 | Refills: 0 | Status: DISCONTINUED | OUTPATIENT
Start: 2018-11-06 | End: 2018-11-19

## 2018-11-06 RX ORDER — SERTRALINE 25 MG/1
50 TABLET, FILM COATED ORAL DAILY
Qty: 0 | Refills: 0 | Status: DISCONTINUED | OUTPATIENT
Start: 2018-11-06 | End: 2018-11-19

## 2018-11-06 RX ORDER — TIZANIDINE 4 MG/1
4 TABLET ORAL
Qty: 0 | Refills: 0 | Status: DISCONTINUED | OUTPATIENT
Start: 2018-11-06 | End: 2018-11-19

## 2018-11-06 RX ORDER — HYDROMORPHONE HYDROCHLORIDE 2 MG/ML
2 INJECTION INTRAMUSCULAR; INTRAVENOUS; SUBCUTANEOUS EVERY 4 HOURS
Qty: 0 | Refills: 0 | Status: DISCONTINUED | OUTPATIENT
Start: 2018-11-06 | End: 2018-11-12

## 2018-11-06 RX ORDER — DEXTROSE 50 % IN WATER 50 %
12.5 SYRINGE (ML) INTRAVENOUS ONCE
Qty: 0 | Refills: 0 | Status: DISCONTINUED | OUTPATIENT
Start: 2018-11-06 | End: 2018-11-19

## 2018-11-06 RX ORDER — HUMAN INSULIN 100 [IU]/ML
6 INJECTION, SUSPENSION SUBCUTANEOUS
Qty: 0 | Refills: 0 | Status: DISCONTINUED | OUTPATIENT
Start: 2018-11-06 | End: 2018-11-19

## 2018-11-06 RX ORDER — PANTOPRAZOLE SODIUM 20 MG/1
40 TABLET, DELAYED RELEASE ORAL
Qty: 0 | Refills: 0 | Status: DISCONTINUED | OUTPATIENT
Start: 2018-11-06 | End: 2018-11-19

## 2018-11-06 RX ORDER — FERROUS SULFATE 325(65) MG
300 TABLET ORAL DAILY
Qty: 0 | Refills: 0 | Status: DISCONTINUED | OUTPATIENT
Start: 2018-11-06 | End: 2018-11-19

## 2018-11-06 RX ORDER — CEFEPIME 1 G/1
1000 INJECTION, POWDER, FOR SOLUTION INTRAMUSCULAR; INTRAVENOUS EVERY 12 HOURS
Qty: 0 | Refills: 0 | Status: DISCONTINUED | OUTPATIENT
Start: 2018-11-06 | End: 2018-11-13

## 2018-11-06 RX ORDER — AZITHROMYCIN 500 MG/1
500 TABLET, FILM COATED ORAL ONCE
Qty: 0 | Refills: 0 | Status: COMPLETED | OUTPATIENT
Start: 2018-11-06 | End: 2018-11-06

## 2018-11-06 RX ORDER — BUDESONIDE, MICRONIZED 100 %
0.5 POWDER (GRAM) MISCELLANEOUS
Qty: 0 | Refills: 0 | Status: DISCONTINUED | OUTPATIENT
Start: 2018-11-06 | End: 2018-11-06

## 2018-11-06 RX ORDER — SODIUM CHLORIDE 9 MG/ML
1000 INJECTION, SOLUTION INTRAVENOUS
Qty: 0 | Refills: 0 | Status: DISCONTINUED | OUTPATIENT
Start: 2018-11-06 | End: 2018-11-19

## 2018-11-06 RX ORDER — TIOTROPIUM BROMIDE 18 UG/1
1 CAPSULE ORAL; RESPIRATORY (INHALATION) DAILY
Qty: 0 | Refills: 0 | Status: DISCONTINUED | OUTPATIENT
Start: 2018-11-06 | End: 2018-11-19

## 2018-11-06 RX ORDER — ALBUTEROL 90 UG/1
2 AEROSOL, METERED ORAL EVERY 6 HOURS
Qty: 0 | Refills: 0 | Status: DISCONTINUED | OUTPATIENT
Start: 2018-11-06 | End: 2018-11-13

## 2018-11-06 RX ORDER — DEXTROSE 50 % IN WATER 50 %
15 SYRINGE (ML) INTRAVENOUS ONCE
Qty: 0 | Refills: 0 | Status: DISCONTINUED | OUTPATIENT
Start: 2018-11-06 | End: 2018-11-19

## 2018-11-06 RX ORDER — QUETIAPINE FUMARATE 200 MG/1
25 TABLET, FILM COATED ORAL AT BEDTIME
Qty: 0 | Refills: 0 | Status: DISCONTINUED | OUTPATIENT
Start: 2018-11-06 | End: 2018-11-19

## 2018-11-06 RX ORDER — SODIUM CHLORIDE 9 MG/ML
1000 INJECTION INTRAMUSCULAR; INTRAVENOUS; SUBCUTANEOUS
Qty: 0 | Refills: 0 | Status: DISCONTINUED | OUTPATIENT
Start: 2018-11-06 | End: 2018-11-14

## 2018-11-06 RX ORDER — NYSTATIN CREAM 100000 [USP'U]/G
1 CREAM TOPICAL
Qty: 0 | Refills: 0 | Status: DISCONTINUED | OUTPATIENT
Start: 2018-11-06 | End: 2018-11-19

## 2018-11-06 RX ORDER — GABAPENTIN 400 MG/1
300 CAPSULE ORAL EVERY 8 HOURS
Qty: 0 | Refills: 0 | Status: DISCONTINUED | OUTPATIENT
Start: 2018-11-06 | End: 2018-11-19

## 2018-11-06 RX ORDER — DEXTROSE 50 % IN WATER 50 %
25 SYRINGE (ML) INTRAVENOUS ONCE
Qty: 0 | Refills: 0 | Status: DISCONTINUED | OUTPATIENT
Start: 2018-11-06 | End: 2018-11-19

## 2018-11-06 RX ORDER — MIRTAZAPINE 45 MG/1
7.5 TABLET, ORALLY DISINTEGRATING ORAL AT BEDTIME
Qty: 0 | Refills: 0 | Status: DISCONTINUED | OUTPATIENT
Start: 2018-11-06 | End: 2018-11-19

## 2018-11-06 RX ORDER — CLOPIDOGREL BISULFATE 75 MG/1
75 TABLET, FILM COATED ORAL DAILY
Qty: 0 | Refills: 0 | Status: DISCONTINUED | OUTPATIENT
Start: 2018-11-06 | End: 2018-11-19

## 2018-11-06 RX ORDER — CEFTRIAXONE 500 MG/1
1 INJECTION, POWDER, FOR SOLUTION INTRAMUSCULAR; INTRAVENOUS ONCE
Qty: 0 | Refills: 0 | Status: COMPLETED | OUTPATIENT
Start: 2018-11-06 | End: 2018-11-06

## 2018-11-06 RX ORDER — ACETAMINOPHEN 500 MG
650 TABLET ORAL ONCE
Qty: 0 | Refills: 0 | Status: COMPLETED | OUTPATIENT
Start: 2018-11-06 | End: 2018-11-06

## 2018-11-06 RX ORDER — FENTANYL CITRATE 50 UG/ML
1 INJECTION INTRAVENOUS
Qty: 0 | Refills: 0 | Status: DISCONTINUED | OUTPATIENT
Start: 2018-11-06 | End: 2018-11-12

## 2018-11-06 RX ORDER — SODIUM CHLORIDE 9 MG/ML
1000 INJECTION, SOLUTION INTRAVENOUS
Qty: 0 | Refills: 0 | Status: DISCONTINUED | OUTPATIENT
Start: 2018-11-06 | End: 2018-11-06

## 2018-11-06 RX ORDER — BUDESONIDE AND FORMOTEROL FUMARATE DIHYDRATE 160; 4.5 UG/1; UG/1
2 AEROSOL RESPIRATORY (INHALATION)
Qty: 0 | Refills: 0 | Status: DISCONTINUED | OUTPATIENT
Start: 2018-11-06 | End: 2018-11-19

## 2018-11-06 RX ORDER — CLONAZEPAM 1 MG
1.5 TABLET ORAL AT BEDTIME
Qty: 0 | Refills: 0 | Status: DISCONTINUED | OUTPATIENT
Start: 2018-11-06 | End: 2018-11-13

## 2018-11-06 RX ORDER — FERROUS SULFATE 325(65) MG
300 TABLET ORAL DAILY
Qty: 0 | Refills: 0 | Status: DISCONTINUED | OUTPATIENT
Start: 2018-11-06 | End: 2018-11-06

## 2018-11-06 RX ORDER — MINOCYCLINE HYDROCHLORIDE 45 MG/1
100 TABLET, EXTENDED RELEASE ORAL
Qty: 0 | Refills: 0 | Status: DISCONTINUED | OUTPATIENT
Start: 2018-11-06 | End: 2018-11-19

## 2018-11-06 RX ORDER — ERGOCALCIFEROL 1.25 MG/1
8000 CAPSULE ORAL DAILY
Qty: 0 | Refills: 0 | Status: DISCONTINUED | OUTPATIENT
Start: 2018-11-06 | End: 2018-11-19

## 2018-11-06 RX ORDER — LIDOCAINE 4 G/100G
1 CREAM TOPICAL DAILY
Qty: 0 | Refills: 0 | Status: DISCONTINUED | OUTPATIENT
Start: 2018-11-06 | End: 2018-11-19

## 2018-11-06 RX ORDER — VANCOMYCIN HCL 1 G
1000 VIAL (EA) INTRAVENOUS ONCE
Qty: 0 | Refills: 0 | Status: COMPLETED | OUTPATIENT
Start: 2018-11-06 | End: 2018-11-06

## 2018-11-06 RX ORDER — MUPIROCIN 20 MG/G
1 OINTMENT TOPICAL
Qty: 0 | Refills: 0 | Status: DISCONTINUED | OUTPATIENT
Start: 2018-11-06 | End: 2018-11-19

## 2018-11-06 RX ORDER — ACETAMINOPHEN 500 MG
650 TABLET ORAL EVERY 8 HOURS
Qty: 0 | Refills: 0 | Status: DISCONTINUED | OUTPATIENT
Start: 2018-11-06 | End: 2018-11-19

## 2018-11-06 RX ORDER — PETROLATUM,WHITE
1 JELLY (GRAM) TOPICAL DAILY
Qty: 0 | Refills: 0 | Status: DISCONTINUED | OUTPATIENT
Start: 2018-11-06 | End: 2018-11-07

## 2018-11-06 RX ADMIN — FENTANYL CITRATE 1 PATCH: 50 INJECTION INTRAVENOUS at 19:22

## 2018-11-06 RX ADMIN — SERTRALINE 50 MILLIGRAM(S): 25 TABLET, FILM COATED ORAL at 16:54

## 2018-11-06 RX ADMIN — HYDROMORPHONE HYDROCHLORIDE 2 MILLIGRAM(S): 2 INJECTION INTRAMUSCULAR; INTRAVENOUS; SUBCUTANEOUS at 14:04

## 2018-11-06 RX ADMIN — HYDROMORPHONE HYDROCHLORIDE 2 MILLIGRAM(S): 2 INJECTION INTRAMUSCULAR; INTRAVENOUS; SUBCUTANEOUS at 20:31

## 2018-11-06 RX ADMIN — GABAPENTIN 300 MILLIGRAM(S): 400 CAPSULE ORAL at 22:28

## 2018-11-06 RX ADMIN — MIRTAZAPINE 7.5 MILLIGRAM(S): 45 TABLET, ORALLY DISINTEGRATING ORAL at 22:29

## 2018-11-06 RX ADMIN — PANTOPRAZOLE SODIUM 40 MILLIGRAM(S): 20 TABLET, DELAYED RELEASE ORAL at 16:54

## 2018-11-06 RX ADMIN — ALBUTEROL 2 PUFF(S): 90 AEROSOL, METERED ORAL at 14:49

## 2018-11-06 RX ADMIN — SODIUM CHLORIDE 150 MILLILITER(S): 9 INJECTION, SOLUTION INTRAVENOUS at 02:50

## 2018-11-06 RX ADMIN — SODIUM CHLORIDE 75 MILLILITER(S): 9 INJECTION INTRAMUSCULAR; INTRAVENOUS; SUBCUTANEOUS at 14:09

## 2018-11-06 RX ADMIN — CEFTRIAXONE 1 GRAM(S): 500 INJECTION, POWDER, FOR SOLUTION INTRAMUSCULAR; INTRAVENOUS at 04:00

## 2018-11-06 RX ADMIN — QUETIAPINE FUMARATE 25 MILLIGRAM(S): 200 TABLET, FILM COATED ORAL at 22:28

## 2018-11-06 RX ADMIN — Medication 1 APPLICATION(S): at 16:56

## 2018-11-06 RX ADMIN — MINOCYCLINE HYDROCHLORIDE 100 MILLIGRAM(S): 45 TABLET, EXTENDED RELEASE ORAL at 22:28

## 2018-11-06 RX ADMIN — Medication 650 MILLIGRAM(S): at 20:31

## 2018-11-06 RX ADMIN — FENTANYL CITRATE 1 PATCH: 50 INJECTION INTRAVENOUS at 16:54

## 2018-11-06 RX ADMIN — CEFEPIME 100 MILLIGRAM(S): 1 INJECTION, POWDER, FOR SOLUTION INTRAMUSCULAR; INTRAVENOUS at 06:15

## 2018-11-06 RX ADMIN — TIZANIDINE 4 MILLIGRAM(S): 4 TABLET ORAL at 22:28

## 2018-11-06 RX ADMIN — Medication 1.5 MILLIGRAM(S): at 22:27

## 2018-11-06 RX ADMIN — SODIUM CHLORIDE 75 MILLILITER(S): 9 INJECTION INTRAMUSCULAR; INTRAVENOUS; SUBCUTANEOUS at 22:29

## 2018-11-06 RX ADMIN — Medication 7.5 MILLIGRAM(S): at 16:54

## 2018-11-06 RX ADMIN — HYDROMORPHONE HYDROCHLORIDE 2 MILLIGRAM(S): 2 INJECTION INTRAMUSCULAR; INTRAVENOUS; SUBCUTANEOUS at 21:05

## 2018-11-06 RX ADMIN — TIOTROPIUM BROMIDE 1 CAPSULE(S): 18 CAPSULE ORAL; RESPIRATORY (INHALATION) at 14:48

## 2018-11-06 RX ADMIN — CLOPIDOGREL BISULFATE 75 MILLIGRAM(S): 75 TABLET, FILM COATED ORAL at 16:54

## 2018-11-06 RX ADMIN — AZITHROMYCIN 250 MILLIGRAM(S): 500 TABLET, FILM COATED ORAL at 04:18

## 2018-11-06 RX ADMIN — Medication 300 MILLIGRAM(S): at 16:54

## 2018-11-06 RX ADMIN — CEFEPIME 100 MILLIGRAM(S): 1 INJECTION, POWDER, FOR SOLUTION INTRAMUSCULAR; INTRAVENOUS at 18:28

## 2018-11-06 RX ADMIN — HYDROMORPHONE HYDROCHLORIDE 2 MILLIGRAM(S): 2 INJECTION INTRAMUSCULAR; INTRAVENOUS; SUBCUTANEOUS at 15:17

## 2018-11-06 RX ADMIN — CEFTRIAXONE 100 GRAM(S): 500 INJECTION, POWDER, FOR SOLUTION INTRAMUSCULAR; INTRAVENOUS at 03:30

## 2018-11-06 RX ADMIN — SODIUM CHLORIDE 150 MILLILITER(S): 9 INJECTION, SOLUTION INTRAVENOUS at 12:16

## 2018-11-06 RX ADMIN — AZITHROMYCIN 500 MILLIGRAM(S): 500 TABLET, FILM COATED ORAL at 05:18

## 2018-11-06 RX ADMIN — Medication 250 MILLIGRAM(S): at 06:52

## 2018-11-06 NOTE — ED PROVIDER NOTE - OBJECTIVE STATEMENT
68F h/o Asthma (on predisone), HLD, CVA, UC, right hip replacement presents from SNF as per request of family due to AMS (more somnolent), decreased UO and complaints of hip pain. Had prolonged hospital stay discharged several weeks ago with MRSA bacteremia likely from prosthetic joint, left acetabular fracture. Family is unhappy with rehab facility care, and would also like to speak with social work. Patient unable to provide ROS as only briefly arousable to voice. Family is unaware of fevers, cough. 68F h/o Asthma (on predisone), HLD, CVA, UC, right hip replacement presents from SNF as per request of family due to AMS (more somnolent), decreased UO and complaints of hip pain. Had prolonged hospital stay discharged several weeks ago with MRSA bacteremia likely from prosthetic joint, left acetabular fracture. Family is unhappy with rehab facility care, and would also like to speak with social work. Patient unable to provide ROS as only briefly arousable to voice. Family is unaware of fevers, cough.    Attendinyo female presents with altered mental status.  pt has had hip infection in the past.  no fever/chills.  has been less interactive.

## 2018-11-06 NOTE — CONSULT NOTE ADULT - ATTENDING COMMENTS
Pt seen and examined, XRs reviewed and shows no changes. Callus noted in distal femur, cement spacer in good position. Incisions CDI, no concern for hip or femur as cause of pts pain. No ortho intervention at this time.

## 2018-11-06 NOTE — CHART NOTE - NSCHARTNOTEFT_GEN_A_CORE
Patient's  requests that no information about the patient be given to the rehab facility or the physicians who take care of her there.

## 2018-11-06 NOTE — H&P ADULT - PROBLEM SELECTOR PROBLEM 6
Cerebrovascular accident (CVA), unspecified mechanism Mild asthma without complication, unspecified whether persistent

## 2018-11-06 NOTE — ED PROVIDER NOTE - PHYSICAL EXAMINATION
Fabiana Rosario, .:   GENERAL: Patient awakens to verbal stimuli, but does not answer questions.  HEENT: NC/AT, dry mucous membranes, PERRL, EOMI.  LUNGS: CTAB, no wheezes or crackles.   CARDIAC: RRR, no m/r/g.    ABDOMEN: Soft, NT, ND, No rebound, guarding.   EXT: No edema. +right scar from hip replacement.   MSK: No spinal tenderness, no pain with movement, no deformities.  NEURO: A&Ox0.   SKIN: Warm and dry. No rash.  PSYCH: Normal affect.

## 2018-11-06 NOTE — CONSULT NOTE ADULT - ASSESSMENT
69 yo female with many medical co-morbidities such as UC,dementia, and acid reflux.  Her rt hip down to knee still with a significant amount of hardware yet no gross evidence of relapsed MRSA infection.  It would appear the [facility]CN is achieving its goal of suppressing infection.  Sacral decubitus does not look infected.  She may have had a recent aspiration.  Her urine is likely always with wbc and organisms, I am not to concerned about this finding.  She does not look toxic or septic, rt leg pain likely multifactorial.  Suggest:  1.Continue  per peg BID  2.Cefepime for possible pneumonia, blood cultures  3.ortho f/u, consider wound care input  4.Supportive care, ID issues reviewed with  at bedside.

## 2018-11-06 NOTE — H&P ADULT - PROBLEM SELECTOR PLAN 4
- c/w home meds - c/w home pain meds  - seen by ortho, NTD  - sacral ulcers and heel ulcers, consult placed to wound care  - specialty bed ordered

## 2018-11-06 NOTE — ED ADULT NURSE NOTE - OBJECTIVE STATEMENT
68 year old female presented to ED via EMS from [facility] with c/o of right hip pain starting today as per family. hx of dementia. As per family 'the assisted living facility is neglecting her, we want to see social work for changing facilities.' pt arrived to ED with Blackman intact and draining light brown urine. Pt shows no sign of CP, SOB, nausea/vomiting, numbness/tingling, fever, cough, chills, dizziness, headache, blurred vision. Pt a&ox1 (baseline - dementia), lung sounds clear, heart rate regular, abdomen soft nontender nondistended to palp. Pt has stage II ulcer on sacrum, and redness below surgical site on right hip. IV in right forearm 20G and patent. Pt currently sleeping in bed with family at bedside. Will continue to monitor and assess while offering support and reassurance.

## 2018-11-06 NOTE — PROGRESS NOTE ADULT - PROBLEM SELECTOR PLAN 8
history of cemented right hip   continue fentanyl , tizanidine and neurontin will need input from pain management for any further changes

## 2018-11-06 NOTE — H&P ADULT - NSHPSOCIALHISTORY_GEN_ALL_CORE
no toxic habits   aaox0, currently living in rehab facility  needs assistance with all ADLs  move to wheelchair with pablo

## 2018-11-06 NOTE — CONSULT NOTE ADULT - SUBJECTIVE AND OBJECTIVE BOX
68yFemale c/o R hip pain for the last 2 days. Patient denies traumatic etiology. Patient denies numbness or tingling in the RLE. Has history of R hip hemiarthroplasty, I&D, and periprosthetic femur ORIF.    PMH:  CVA (cerebral vascular accident)  Fatty pancreas  Fatty liver  PNA (pneumonia)  Pulmonary HTN  IGT (impaired glucose tolerance)  Ulcerative colitis  Acid reflux  Anxiety  Depression  Mouth sores  HLD (hyperlipidemia)  Asthma    PSH:  Humeral head fracture  H/O: hysterectomy  H/O cataract extraction, left  History of knee replacement    AH:  aspirin (Short breath)  divalproex sodium (Other (Unknown))  Haldol (Other (Unknown))  penicillin (Short breath; Rash)  Xanax (Other (Unknown))    Meds: See med rec    T(C): 37.6 (11-06-18 @ 02:50)  HR: 65 (11-06-18 @ 02:50)  BP: 106/61 (11-06-18 @ 02:50)  RR: 18 (11-06-18 @ 02:50)  SpO2: 96% (11-06-18 @ 02:50)  Wt(kg): --    PE RLE:  Skin intact, no ecchymosis, no erythema, SILT L2-S1, +EHL/FHL/TA/Gastroc, +Knee/ankle ROM, hip ROM limited 2/2 pain, DP+, soft compartments, no calf ttp, -log roll.    Secondary:  No TTP over bony landmarks, SILT BL, ROM intact BL, distal pulses palpable.    Imaging:  XR demonstrating intact cemented R hip prosthesis and plate/screws

## 2018-11-06 NOTE — H&P ADULT - NSHPPHYSICALEXAM_GEN_ALL_CORE
Vital Signs Last 24 Hrs  T(C): 37.1 (06 Nov 2018 12:39), Max: 37.6 (06 Nov 2018 02:50)  T(F): 98.8 (06 Nov 2018 12:39), Max: 99.6 (06 Nov 2018 02:50)  HR: 99 (06 Nov 2018 12:39) (65 - 103)  BP: 129/77 (06 Nov 2018 12:39) (106/61 - 132/74)  BP(mean): --  RR: 18 (06 Nov 2018 12:39) (16 - 18)  SpO2: 96% (06 Nov 2018 12:39) (93% - 96%)    GENERAL: lethargic, frail  HEAD:  Atraumatic, Normocephalic  EYES: EOMI, PERRLA, conjunctiva and sclera clear  ENT: Pharynx not erythematous  PULMONARY: decreased lung sounds at b/l lung bases  CARDIOVASCULAR: Regular rate and rhythm; No murmurs, rubs, or gallops  ABDOMEN: Soft, Nontender, Nondistended; Bowel sounds present, peg site clean  EXTREMITIES:  2+ Peripheral Pulses, 1/5 motor strength in lower extremities   MUSCULOSKELETAL: No calf tenderness  PSYCH: AAOx0  NEUROLOGY: non focal  SKIN: warm and dry, No rashes or lesions

## 2018-11-06 NOTE — ED PROVIDER NOTE - MEDICAL DECISION MAKING DETAILS
68F p/w AMS, hip pain. Concern for infection/ UTI/ bacteremia. Will get labs, UA, UCx. admit. Orhto consult.

## 2018-11-06 NOTE — PROGRESS NOTE ADULT - SUBJECTIVE AND OBJECTIVE BOX
- Patient seen and examined by me approximately thirty minutes ago.   68F h/o Asthma (on predisone), HLD, CVA, UC, right hip replacement presents from SNF as per request of family due to AMS (more somnolent), decreased UO and complaints of hip pain. Had prolonged hospital stay discharged several weeks ago with MRSA bacteremia likely from prosthetic joint, left acetabular fracture. Family is unhappy with rehab facility care, and would also like to speak with social work. Patient unable to provide ROS as only briefly arousable to voice. Family is unaware of fevers, cough.    med hx copc dementia hyperglycemia due to medication , cva, piedad deficiency anemia  depression  chronic pain from surgery muscle spasm . sacral decubitus    ---___---___---___---___---___---      <<<  MEDICATIONS:  >>>    MEDICATIONS  (STANDING):  ALBUTerol    90 MICROgram(s) HFA Inhaler 2 Puff(s) Inhalation every 6 hours  buDESOnide   0.5 milliGRAM(s) Respule 0.5 milliGRAM(s) Inhalation two times a day  buDESOnide 160 MICROgram(s)/formoterol 4.5 MICROgram(s) Inhaler 2 Puff(s) Inhalation two times a day  cefepime   IVPB 1000 milliGRAM(s) IV Intermittent every 12 hours  clonazePAM Tablet 1.5 milliGRAM(s) Oral at bedtime  clopidogrel Tablet 75 milliGRAM(s) Oral daily  dextrose 5%. 1000 milliLiter(s) (50 mL/Hr) IV Continuous <Continuous>  dextrose 50% Injectable 12.5 Gram(s) IV Push once  dextrose 50% Injectable 25 Gram(s) IV Push once  dextrose 50% Injectable 25 Gram(s) IV Push once  ergocalciferol Drops 8000 Unit(s) Oral daily  fentaNYL   Patch  12 MICROgram(s)/Hr 1 Patch Transdermal every 72 hours  ferrous    sulfate Liquid 300 milliGRAM(s) Enteral Tube daily  ferrous    sulfate Liquid 300 milliGRAM(s) Enteral Tube daily  insulin NPH human recombinant 6 Unit(s) SubCutaneous before breakfast  lidocaine   Patch 1 Patch Transdermal daily  minocycline 100 milliGRAM(s) Oral two times a day  mirtazapine 7.5 milliGRAM(s) Oral at bedtime  mupirocin 2% Ointment 1 Application(s) Topical two times a day  pantoprazole   Suspension 40 milliGRAM(s) Oral before breakfast  petrolatum white Ointment 1 Application(s) Topical daily  predniSONE   Tablet 7.5 milliGRAM(s) Oral daily  QUEtiapine 25 milliGRAM(s) Oral at bedtime  sertraline 50 milliGRAM(s) Oral daily  sodium chloride 0.9%. 1000 milliLiter(s) (75 mL/Hr) IV Continuous <Continuous>  tiotropium 18 MICROgram(s) Capsule 1 Capsule(s) Inhalation daily      MEDICATIONS  (PRN):  acetaminophen    Suspension .. 650 milliGRAM(s) Oral every 8 hours PRN Mild Pain (1 - 3)  dextrose 40% Gel 15 Gram(s) Oral once PRN Blood Glucose LESS THAN 70 milliGRAM(s)/deciliter  glucagon  Injectable 1 milliGRAM(s) IntraMuscular once PRN Glucose LESS THAN 70 milligrams/deciliter  HYDROmorphone   Tablet 2 milliGRAM(s) Enteral Tube every 4 hours PRN Moderate Pain (4 - 6)  LORazepam     Tablet 0.5 milliGRAM(s) Oral every 12 hours PRN Agitation  nystatin Powder 1 Application(s) Topical two times a day PRN rash/itchiness       ---___---___---___---___---___---     <<<REVIEW OF SYSTEM: >>>    unable to obtain    ---___---___---___---___---___---          <<<  VITAL SIGNS: >>>    T(F): 98.8 (18 @ 12:39), Max: 99.6 (18 @ 02:50)  HR: 99 (18 @ 12:39) (65 - 103)  BP: 129/77 (18 @ 12:39) (106/61 - 132/74)  RR: 18 (18 @ 12:39) (16 - 18)  SpO2: 96% (18 @ 12:39) (93% - 96%)  Wt(kg): --  CAPILLARY BLOOD GLUCOSE        I&O's Summary       ---___---___---___---___---___---                       PHYSICAL EXAM:    GEN: A&O X 2 , NAD , comfortable bedbound   HEENT: NCAT, PERRL, MMM, no scleral icterus, hearing intact  NECK: Supple, No JVD  CVS: S1S2 , regular , No M/R/G appreciated  PULM: CTA B/L,  no W/R/R appreciated  ABD.: soft. non tender, non distended,  bowel sounds present (+) peg noted   gavin in place  Extrem: intact pulses , no edema noted  Derm: No rash or ecchymosis noted  PSYCH: normal mood,  depression,  anxious     ---___---___---___---___---___---     <<<  LAB AND IMAGING: >>>                          10.2   10.6  )-----------( 367      ( 2018 02:55 )             28.4               11-06    128<L>  |  88<L>  |  21  ----------------------------<  104<H>  3.8   |  24  |  0.42<L>    Ca    9.2      2018 02:55    TPro  5.9<L>  /  Alb  3.0<L>  /  TBili  0.3  /  DBili  x   /  AST  18  /  ALT  7<L>  /  AlkPhos  71  11-06    PT/INR - ( 2018 02:55 )   PT: 11.3 sec;   INR: 0.99 ratio         PTT - ( 2018 02:55 )  PTT:34.7 sec       Urinalysis Basic - ( 2018 02:53 )    Color: Yellow / Appearance: Slightly Turbid / S.022 / pH: x  Gluc: x / Ketone: Negative  / Bili: Negative / Urobili: Negative   Blood: x / Protein: 30 mg/dL / Nitrite: Negative   Leuk Esterase: Large / RBC: 14 /hpf / WBC 90 /hpf   Sq Epi: x / Non Sq Epi: 0 /hpf / Bacteria: Few                        [All pertinent / recent available Imaging reports and other labs reviewed]     ---___---___---___---___---___---___ ---___---___---___---___---           <<<  A S S E S S M E N T   A N YE   P L A N :  >>>          -GI/DVT Prophylaxis.    --------------------------------------------  Case discussed with kendra roque at bedside  Education given on     >>______________________<<      Deniz Bolden .         phone   [phone]

## 2018-11-06 NOTE — H&P ADULT - PROBLEM SELECTOR PLAN 3
- c/w home pain meds  - seen by ortho, NTD  - sacral ulcers and heel ulcers, consult placed to wound care  - specialty bed ordered - likely 2' deydration  - start NS at 75 cc/hr x 1 L

## 2018-11-06 NOTE — CHART NOTE - NSCHARTNOTEFT_GEN_A_CORE
Medicine NP Episodic Note     Called by RN to evaluate pt. with temp of 101.4 (38.6).  Pt. seen and examined at bedside, A+Ox1, in NAD.  Denies c/o SOB, palpitations, HA, cough, chills, N/V/D, abd pain or dysuria.    VITAL SIGNS:  T(C): 37.2 (18 @ 22:20), Max: 38.6 (18 @ 20:10)  HR: 90 (18 @ 22:20) (70 - 105)  BP: 140/80 (18 @ 20:09) (111/65 - 140/80)  RR: 17 (18 @ 20:09) (17 - 18)  SpO2: 95% (18 @ 20:09) (93% - 96%)  Wt(kg): --      LABORATORY:                          10.2   10.6  )-----------( 367      ( 2018 02:55 )             28.4           128<L>  |  88<L>  |  21  ----------------------------<  104<H>  3.8   |  24  |  0.42<L>    Ca    9.2      2018 02:55    TPro  5.9<L>  /  Alb  3.0<L>  /  TBili  0.3  /  DBili  x   /  AST  18  /  ALT  7<L>  /  AlkPhos  71        Urinalysis Basic - ( 2018 02:53 )  Color: Yellow / Appearance: Slightly Turbid / S.022 / pH: x  Gluc: x / Ketone: Negative  / Bili: Negative / Urobili: Negative   Blood: x / Protein: 30 mg/dL / Nitrite: Negative   Leuk Esterase: Large / RBC: 14 /hpf / WBC 90 /hpf   Sq Epi: x / Non Sq Epi: 0 /hpf / Bacteria: Few          RADIOLOGY:  < from: Xray Chest 1 View- PORTABLE-Urgent (18 @ 03:37) >  IMPRESSION: Patchy opacities in the right upper lung field, may represent   infection. Chronic lung changes cannot be ruled out entirely.      MEDICATIONS:   MEDICATIONS  (STANDING):  ALBUTerol    90 MICROgram(s) HFA Inhaler 2 Puff(s) Inhalation every 6 hours  buDESOnide 160 MICROgram(s)/formoterol 4.5 MICROgram(s) Inhaler 2 Puff(s) Inhalation two times a day  cefepime   IVPB 1000 milliGRAM(s) IV Intermittent every 12 hours  clonazePAM Tablet 1.5 milliGRAM(s) Oral at bedtime  clopidogrel Tablet 75 milliGRAM(s) Oral daily  dextrose 5%. 1000 milliLiter(s) (50 mL/Hr) IV Continuous <Continuous>  dextrose 50% Injectable 12.5 Gram(s) IV Push once  dextrose 50% Injectable 25 Gram(s) IV Push once  dextrose 50% Injectable 25 Gram(s) IV Push once  ergocalciferol Drops 8000 Unit(s) Oral daily  fentaNYL   Patch  12 MICROgram(s)/Hr 1 Patch Transdermal every 72 hours  ferrous    sulfate Liquid 300 milliGRAM(s) Enteral Tube daily  gabapentin 300 milliGRAM(s) Oral every 8 hours  insulin NPH human recombinant 6 Unit(s) SubCutaneous before breakfast  lidocaine   Patch 1 Patch Transdermal daily  minocycline 100 milliGRAM(s) Oral two times a day  mirtazapine 7.5 milliGRAM(s) Oral at bedtime  mupirocin 2% Ointment 1 Application(s) Topical two times a day  pantoprazole   Suspension 40 milliGRAM(s) Oral before breakfast  petrolatum white Ointment 1 Application(s) Topical daily  predniSONE   Tablet 7.5 milliGRAM(s) Oral daily  QUEtiapine 25 milliGRAM(s) Oral at bedtime  sertraline 50 milliGRAM(s) Oral daily  sodium chloride 0.9%. 1000 milliLiter(s) (75 mL/Hr) IV Continuous <Continuous>  tiotropium 18 MICROgram(s) Capsule 1 Capsule(s) Inhalation daily  tiZANidine 4 milliGRAM(s) Oral <User Schedule>      PHYSICAL EXAM:    Constitutional: AOx1, in NAD  Resp: CTAB, resp. even and non-labored   CV: S1S2, RR  GI: BS X4 active, soft, ND/NT, + PEG  Ext: +2 pulses bilaterally. Trace BLE edema.      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  Now with first documented fever.      # Fever    > Tylenol and cooling measures PRN for pyrexia  > F/u BC, urine Cx   > Continue with Cefepime IVPB, Minocycline PO  > F/u am labs   > Monitor vital signs   > ID on board     Will endorse to primary team in am; attending to follow.    Milena Stern, P-BC  [phone]

## 2018-11-06 NOTE — H&P ADULT - ASSESSMENT
Patient is a 68 year old female with advanced Alzheimers dementia (PEG tube in place, chronic gavin catheter in place), bed bound with muscle spasms and sacral decubitus ulcers/ b/l heel ulcers, anxiety, depression, asthma on prednisone, HLD, CVA, UC, right prosthetic hip MRSA infection in 7/2018 s/p pacer in place currently living in STR sent in from rehab for further evaluation of lethargy admitted for metabolic encephalopathy 2' complicated cystitis given chronic gavin catheter.

## 2018-11-06 NOTE — CONSULT NOTE ADULT - ASSESSMENT
68F w/ right hip pain, sacral decubitus ulcer, positive UA and AMS  Hip demonstrates no sign of ertyema, effusion, swelling, or hematoma  AMS may likely be 2/2 urinary findings  FU ESR/CRP  Analgesia  DVT ppx  Will d/w attending any further recommendations

## 2018-11-06 NOTE — PROVIDER CONTACT NOTE (OTHER) - SITUATION
Pt had 2 soft, large BM's following feeding on 50 cc/hr. Family states pt should start at a lower amount to prevent soft BM's and stomach upset.

## 2018-11-06 NOTE — CONSULT NOTE ADULT - SUBJECTIVE AND OBJECTIVE BOX
HPI:   Patient is a 68y female with a past history of dementia, CVA,ulcerative colitis,recent lengthy hospital stay for an MRSA infection of Rt hip prosthesis, who is being admitted from a SNF with multiple issues.  Her prior hospitalization was for an MRSA bacteremia and septic shock after a rt replacement following a fracture.She was with a sustained bacteremia, eventually required debridement of Rt hip.She has retained hardware.Her GAYATHRI was negative, she had respiratory failure, likely aspiration pneumonia, and ultimately required a peg.She received multiple antibiotic courses for possible GNR pneumonia, completed 6 weeks of dapto and was placed on minocycline.  She has been at SNF x 4 weeks, issues include worsening rt hip pain, development of sacral decubitis, decreased urine output,and ? difficulty breathing.Pain control has been an issue.No fever.she is minimally verbal.Cefepime started in ER.   is filling in recent events.    REVIEW OF SYSTEMS:  All other review of systems negative (Comprehensive ROS)    PAST MEDICAL & SURGICAL HISTORY:  CVA (cerebral vascular accident)  Fatty pancreas  PNA (pneumonia)  Pulmonary HTN  IGT (impaired glucose tolerance)  Ulcerative colitis  Acid reflux  Anxiety  Depression  Mouth sores  HLD (hyperlipidemia)  Asthma  Humeral head fracture  H/O: hysterectomy  H/O cataract extraction, left  History of knee replacement      Allergies    ASA; dye contrast (Anaphylaxis)  aspirin (Short breath)  divalproex sodium (Other (Unknown))  Haldol (Other (Unknown))  penicillin (Short breath; Rash)  sulfa drugs (Short breath; Rash)  Xanax (Other (Unknown))    Intolerances        Antimicrobials Day #  :day 1  cefepime   IVPB 1000 milliGRAM(s) IV Intermittent every 12 hours  minocycline 100 milliGRAM(s) Oral two times a day    Other Medications:  acetaminophen    Suspension .. 650 milliGRAM(s) Oral every 8 hours PRN  ALBUTerol    90 MICROgram(s) HFA Inhaler 2 Puff(s) Inhalation every 6 hours  buDESOnide   0.5 milliGRAM(s) Respule 0.5 milliGRAM(s) Inhalation two times a day  buDESOnide 160 MICROgram(s)/formoterol 4.5 MICROgram(s) Inhaler 2 Puff(s) Inhalation two times a day  clonazePAM Tablet 1.5 milliGRAM(s) Oral at bedtime  clopidogrel Tablet 75 milliGRAM(s) Oral daily  dextrose 40% Gel 15 Gram(s) Oral once PRN  dextrose 5%. 1000 milliLiter(s) IV Continuous <Continuous>  dextrose 50% Injectable 12.5 Gram(s) IV Push once  dextrose 50% Injectable 25 Gram(s) IV Push once  dextrose 50% Injectable 25 Gram(s) IV Push once  ergocalciferol Drops 8000 Unit(s) Oral daily  fentaNYL   Patch  12 MICROgram(s)/Hr 1 Patch Transdermal every 72 hours  ferrous    sulfate Liquid 300 milliGRAM(s) Enteral Tube daily  ferrous    sulfate Liquid 300 milliGRAM(s) Enteral Tube daily  glucagon  Injectable 1 milliGRAM(s) IntraMuscular once PRN  HYDROmorphone   Tablet 2 milliGRAM(s) Enteral Tube every 4 hours PRN  insulin NPH human recombinant 6 Unit(s) SubCutaneous before breakfast  lidocaine   Patch 1 Patch Transdermal daily  LORazepam     Tablet 0.5 milliGRAM(s) Oral every 12 hours PRN  mirtazapine 7.5 milliGRAM(s) Oral at bedtime  mupirocin 2% Ointment 1 Application(s) Topical two times a day  nystatin Powder 1 Application(s) Topical two times a day PRN  pantoprazole   Suspension 40 milliGRAM(s) Oral before breakfast  petrolatum white Ointment 1 Application(s) Topical daily  predniSONE   Tablet 7.5 milliGRAM(s) Oral daily  QUEtiapine 25 milliGRAM(s) Oral at bedtime  sertraline 50 milliGRAM(s) Oral daily  sodium chloride 0.9%. 1000 milliLiter(s) IV Continuous <Continuous>  tiotropium 18 MICROgram(s) Capsule 1 Capsule(s) Inhalation daily      FAMILY HISTORY:  Family history of dementia (Grandparent)  Family history of colon cancer (Grandparent)      SOCIAL HISTORY:  Smoking: no    ETOH: no    Drug Use:no         T(F): 98.8 (18 @ 12:39), Max: 99.6 (18 @ 02:50)  HR: 99 (18 @ 12:39)  BP: 129/77 (18 @ 12:39)  RR: 18 (18 @ 12:39)  SpO2: 96% (18 @ 12:39)  Wt(kg): --    PHYSICAL EXAM:  General: alert, no acute distress  Eyes:  anicteric, no conjunctival injection, no discharge  Oropharynx: no lesions or injection 	  Neck: supple, without adenopathy  Lungs: clear to auscultation, poor inspiratory effert  Heart: regular rate and rhythm; no murmur, rubs or gallops  Abdomen: soft, nondistended, nontender, without mass or organomegaly, peg in place  Skin: sacral decubitus, stage 3, no cellulitis,approx. 3x3 inches.faint macular rash on legs  Extremities: no clubbing, cyanosis, trace edema  Neurologic: alert, attentive, not verbal  Rt hip incision is intact, rt knee with a superficial wound which is clean, no cellulitis or soft tissue infection.  LAB RESULTS:                        10.2   10.6  )-----------( 367      ( 2018 02:55 )             28.4     11-    128<L>  |  88<L>  |  21  ----------------------------<  104<H>  3.8   |  24  |  0.42<L>    Ca    9.2      2018 02:55    TPro  5.9<L>  /  Alb  3.0<L>  /  TBili  0.3  /  DBili  x   /  AST  18  /  ALT  7<L>  /  AlkPhos  71  11-06    LIVER FUNCTIONS - ( 2018 02:55 )  Alb: 3.0 g/dL / Pro: 5.9 g/dL / ALK PHOS: 71 U/L / ALT: 7 U/L / AST: 18 U/L / GGT: x           Urinalysis Basic - ( 2018 02:53 )    Color: Yellow / Appearance: Slightly Turbid / S.022 / pH: x  Gluc: x / Ketone: Negative  / Bili: Negative / Urobili: Negative   Blood: x / Protein: 30 mg/dL / Nitrite: Negative   Leuk Esterase: Large / RBC: 14 /hpf / WBC 90 /hpf   Sq Epi: x / Non Sq Epi: 0 /hpf / Bacteria: Few        MICROBIOLOGY:  RECENT CULTURES:        RADIOLOGY REVIEWED:  < from: Xray Femur 2 Views, Right (18 @ 05:17) >  INTERPRETATION:  CLINICAL INDICATION: Atraumatic right hip pain for 2   days and altered mental status.    TECHNIQUE: 2 views of the right femur    COMPARISON: Radiograph right femur 2018    IMPRESSION:    Exam is limited by positioning. Again noted is a cemented right hip   hemiarthroplasty with cerclage wire and a long lateral femoral plate and   screws transfixing a right distal femoral shaft fracture. Separation   between the bone and lateral plate distally seen previously is not   well-visualized on this exam secondary to positioning. Interfragmentary   screws are also again noted. Patient is also status post right knee   arthroplasty withpartially imaged hardware.    < end of copied text >  < from: Xray Chest 1 View- PORTABLE-Urgent (11.06.18 @ 03:37) >  INTERPRETATION:  CLINICAL INFORMATION: Fever.    EXAM: Frontal radiograph of the chest.    COMPARISON: Chest radiograph from 2018.    FINDINGS:  Cardiac size cannot be determined on this AP projection.    Patchy opacities in the right upper lung field. No pneumothorax.    The soft tissues are unremarkable.    IMPRESSION: Patchy opacities in the right upper lung field, may represent   infection. Chronic lung changes cannot be ruled out entirely.    < end of copied text >

## 2018-11-06 NOTE — ED ADULT NURSE NOTE - NSIMPLEMENTINTERV_GEN_ALL_ED
Implemented All Fall with Harm Risk Interventions:  [place] to call system. Call bell, personal items and telephone within reach. Instruct patient to call for assistance. Room bathroom lighting operational. Non-slip footwear when patient is off stretcher. Physically safe environment: no spills, clutter or unnecessary equipment. Stretcher in lowest position, wheels locked, appropriate side rails in place. Provide visual cue, wrist band, yellow gown, etc. Monitor gait and stability. Monitor for mental status changes and reorient to person, place, and time. Review medications for side effects contributing to fall risk. Reinforce activity limits and safety measures with patient and family. Provide visual clues: red socks.

## 2018-11-06 NOTE — H&P ADULT - NSHPLABSRESULTS_GEN_ALL_CORE
.  LABS:                         10.2   10.6  )-----------( 367      ( 2018 02:55 )             28.4     11-    128<L>  |  88<L>  |  21  ----------------------------<  104<H>  3.8   |  24  |  0.42<L>    Ca    9.2      2018 02:55    TPro  5.9<L>  /  Alb  3.0<L>  /  TBili  0.3  /  DBili  x   /  AST  18  /  ALT  7<L>  /  AlkPhos  71  11-06    PT/INR - ( 2018 02:55 )   PT: 11.3 sec;   INR: 0.99 ratio         PTT - ( 2018 02:55 )  PTT:34.7 sec  Urinalysis Basic - ( 2018 02:53 )    Color: Yellow / Appearance: Slightly Turbid / S.022 / pH: x  Gluc: x / Ketone: Negative  / Bili: Negative / Urobili: Negative   Blood: x / Protein: 30 mg/dL / Nitrite: Negative   Leuk Esterase: Large / RBC: 14 /hpf / WBC 90 /hpf   Sq Epi: x / Non Sq Epi: 0 /hpf / Bacteria: Few            RADIOLOGY, EKG & ADDITIONAL TESTS: Reviewed.   cxr with right upper field infection

## 2018-11-06 NOTE — H&P ADULT - HISTORY OF PRESENT ILLNESS
** history obtained entirely from , patient unable to offer any history given advanced dementia**    Patient is a 68 year old female with advanced Alzheimers dementia (PEG tube in place, chronic gavin catheter in place), bed bound with muscle spasms and sacral decubitus ulcers/ b/l heel ulcers, anxiety, depressions, right prosthetic hip MRSA infection in 7/2018 s/p pacer in place currently living in STR sent in from rehab for further evaluation of lethargy. Per  at bedside, patient has had very little energy for the last 48 hours. She is not as responsive as her baseline and is spending most of the sleeping. He is not aware of any fevers, chills. Patient denies any cough or sputum production. Family is seeking change of rehab facility.    In the ED, patient cultures drawn, given vancomcyin, ceftriaxone and azithromax. CXR with possible infiltrate and UA positive.    Son and granddaughter at bedside. ** history obtained entirely from , patient unable to offer any history given advanced dementia**    Patient is a 68 year old female with advanced Alzheimers dementia (PEG tube in place, chronic gavin catheter in place), bed bound with muscle spasms and sacral decubitus ulcers/ b/l heel ulcers, anxiety, depression, asthma on prednisone, HLD, CVA, UC, right prosthetic hip MRSA infection in 7/2018 s/p pacer in place currently living in STR sent in from rehab for further evaluation of lethargy. Per  at bedside, patient has had very little energy for the last 48 hours. She is not as responsive as her baseline and is spending most of the sleeping. He is not aware of any fevers, chills. Patient denies any cough or sputum production. Family is seeking change of rehab facility.    In the ED, patient cultures drawn, given vancomcyin, ceftriaxone and azithromax. CXR with possible infiltrate and UA positive.    Son and granddaughter at bedside.

## 2018-11-06 NOTE — ED ADULT NURSE NOTE - CHPI ED NUR SYMPTOMS NEG
no decreased eating/drinking/no chills/no weakness/no vomiting/no nausea/no tingling/no dizziness/no fever

## 2018-11-06 NOTE — H&P ADULT - PROBLEM SELECTOR PLAN 1
- lethargy and encephalopathy 2' likely catheter associated urinary tract infection  - should have gavin changed however per family gavin change is difficult for her, may need urology to change catheter  - c/w cefepime for now  - urine and blood cultures sent  - start NS at 75 cc/hr x 1L  - consult placed to ID   - chest xray with right upper lobe patchy infiltrate, no clear symptoms?; possible pneumonia  - consult placed to physical therapy

## 2018-11-07 ENCOUNTER — TRANSCRIPTION ENCOUNTER (OUTPATIENT)
Age: 68
End: 2018-11-07

## 2018-11-07 LAB
ALBUMIN SERPL ELPH-MCNC: 2.8 G/DL — LOW (ref 3.3–5)
ALP SERPL-CCNC: 62 U/L — SIGNIFICANT CHANGE UP (ref 40–120)
ALT FLD-CCNC: 7 U/L — LOW (ref 10–45)
ANION GAP SERPL CALC-SCNC: 14 MMOL/L — SIGNIFICANT CHANGE UP (ref 5–17)
AST SERPL-CCNC: 13 U/L — SIGNIFICANT CHANGE UP (ref 10–40)
BASOPHILS # BLD AUTO: 0.02 K/UL — SIGNIFICANT CHANGE UP (ref 0–0.2)
BASOPHILS NFR BLD AUTO: 0.2 % — SIGNIFICANT CHANGE UP (ref 0–2)
BILIRUB SERPL-MCNC: 0.2 MG/DL — SIGNIFICANT CHANGE UP (ref 0.2–1.2)
BUN SERPL-MCNC: 16 MG/DL — SIGNIFICANT CHANGE UP (ref 7–23)
CALCIUM SERPL-MCNC: 8.7 MG/DL — SIGNIFICANT CHANGE UP (ref 8.4–10.5)
CHLORIDE SERPL-SCNC: 95 MMOL/L — LOW (ref 96–108)
CO2 SERPL-SCNC: 24 MMOL/L — SIGNIFICANT CHANGE UP (ref 22–31)
CREAT SERPL-MCNC: 0.46 MG/DL — LOW (ref 0.5–1.3)
EOSINOPHIL # BLD AUTO: 0.45 K/UL — SIGNIFICANT CHANGE UP (ref 0–0.5)
EOSINOPHIL NFR BLD AUTO: 3.9 % — SIGNIFICANT CHANGE UP (ref 0–6)
GLUCOSE BLDC GLUCOMTR-MCNC: 149 MG/DL — HIGH (ref 70–99)
GLUCOSE BLDC GLUCOMTR-MCNC: 150 MG/DL — HIGH (ref 70–99)
GLUCOSE BLDC GLUCOMTR-MCNC: 210 MG/DL — HIGH (ref 70–99)
GLUCOSE BLDC GLUCOMTR-MCNC: 222 MG/DL — HIGH (ref 70–99)
GLUCOSE BLDC GLUCOMTR-MCNC: 234 MG/DL — HIGH (ref 70–99)
GLUCOSE SERPL-MCNC: 164 MG/DL — HIGH (ref 70–99)
HBA1C BLD-MCNC: 5.1 % — SIGNIFICANT CHANGE UP (ref 4–5.6)
HCT VFR BLD CALC: 29.8 % — LOW (ref 34.5–45)
HGB BLD-MCNC: 9.9 G/DL — LOW (ref 11.5–15.5)
IMM GRANULOCYTES NFR BLD AUTO: 0.2 % — SIGNIFICANT CHANGE UP (ref 0–1.5)
LYMPHOCYTES # BLD AUTO: 0.5 K/UL — LOW (ref 1–3.3)
LYMPHOCYTES # BLD AUTO: 4.4 % — LOW (ref 13–44)
MCHC RBC-ENTMCNC: 30.4 PG — SIGNIFICANT CHANGE UP (ref 27–34)
MCHC RBC-ENTMCNC: 33.2 GM/DL — SIGNIFICANT CHANGE UP (ref 32–36)
MCV RBC AUTO: 91.4 FL — SIGNIFICANT CHANGE UP (ref 80–100)
MONOCYTES # BLD AUTO: 0.67 K/UL — SIGNIFICANT CHANGE UP (ref 0–0.9)
MONOCYTES NFR BLD AUTO: 5.9 % — SIGNIFICANT CHANGE UP (ref 2–14)
NEUTROPHILS # BLD AUTO: 9.76 K/UL — HIGH (ref 1.8–7.4)
NEUTROPHILS NFR BLD AUTO: 85.4 % — HIGH (ref 43–77)
PLATELET # BLD AUTO: 384 K/UL — SIGNIFICANT CHANGE UP (ref 150–400)
POTASSIUM SERPL-MCNC: 3.7 MMOL/L — SIGNIFICANT CHANGE UP (ref 3.5–5.3)
POTASSIUM SERPL-SCNC: 3.7 MMOL/L — SIGNIFICANT CHANGE UP (ref 3.5–5.3)
PROT SERPL-MCNC: 5.1 G/DL — LOW (ref 6–8.3)
RBC # BLD: 3.26 M/UL — LOW (ref 3.8–5.2)
RBC # FLD: 14.8 % — HIGH (ref 10.3–14.5)
SODIUM SERPL-SCNC: 133 MMOL/L — LOW (ref 135–145)
WBC # BLD: 11.42 K/UL — HIGH (ref 3.8–10.5)
WBC # FLD AUTO: 11.42 K/UL — HIGH (ref 3.8–10.5)

## 2018-11-07 RX ORDER — DIPHENHYDRAMINE HCL 50 MG
25 CAPSULE ORAL ONCE
Qty: 0 | Refills: 0 | Status: COMPLETED | OUTPATIENT
Start: 2018-11-07 | End: 2018-11-07

## 2018-11-07 RX ADMIN — SERTRALINE 50 MILLIGRAM(S): 25 TABLET, FILM COATED ORAL at 11:46

## 2018-11-07 RX ADMIN — Medication 7.5 MILLIGRAM(S): at 05:28

## 2018-11-07 RX ADMIN — GABAPENTIN 300 MILLIGRAM(S): 400 CAPSULE ORAL at 05:28

## 2018-11-07 RX ADMIN — MINOCYCLINE HYDROCHLORIDE 100 MILLIGRAM(S): 45 TABLET, EXTENDED RELEASE ORAL at 16:53

## 2018-11-07 RX ADMIN — LIDOCAINE 1 PATCH: 4 CREAM TOPICAL at 23:21

## 2018-11-07 RX ADMIN — MINOCYCLINE HYDROCHLORIDE 100 MILLIGRAM(S): 45 TABLET, EXTENDED RELEASE ORAL at 05:28

## 2018-11-07 RX ADMIN — LIDOCAINE 1 PATCH: 4 CREAM TOPICAL at 18:17

## 2018-11-07 RX ADMIN — CLOPIDOGREL BISULFATE 75 MILLIGRAM(S): 75 TABLET, FILM COATED ORAL at 11:43

## 2018-11-07 RX ADMIN — SODIUM CHLORIDE 75 MILLILITER(S): 9 INJECTION INTRAMUSCULAR; INTRAVENOUS; SUBCUTANEOUS at 09:27

## 2018-11-07 RX ADMIN — TIOTROPIUM BROMIDE 1 CAPSULE(S): 18 CAPSULE ORAL; RESPIRATORY (INHALATION) at 11:46

## 2018-11-07 RX ADMIN — PANTOPRAZOLE SODIUM 40 MILLIGRAM(S): 20 TABLET, DELAYED RELEASE ORAL at 05:29

## 2018-11-07 RX ADMIN — ALBUTEROL 2 PUFF(S): 90 AEROSOL, METERED ORAL at 05:29

## 2018-11-07 RX ADMIN — ALBUTEROL 2 PUFF(S): 90 AEROSOL, METERED ORAL at 17:00

## 2018-11-07 RX ADMIN — FENTANYL CITRATE 1 PATCH: 50 INJECTION INTRAVENOUS at 18:16

## 2018-11-07 RX ADMIN — NYSTATIN CREAM 1 APPLICATION(S): 100000 CREAM TOPICAL at 05:37

## 2018-11-07 RX ADMIN — ALBUTEROL 2 PUFF(S): 90 AEROSOL, METERED ORAL at 23:23

## 2018-11-07 RX ADMIN — ERGOCALCIFEROL 8000 UNIT(S): 1.25 CAPSULE ORAL at 14:41

## 2018-11-07 RX ADMIN — FENTANYL CITRATE 1 PATCH: 50 INJECTION INTRAVENOUS at 07:21

## 2018-11-07 RX ADMIN — BUDESONIDE AND FORMOTEROL FUMARATE DIHYDRATE 2 PUFF(S): 160; 4.5 AEROSOL RESPIRATORY (INHALATION) at 17:01

## 2018-11-07 RX ADMIN — GABAPENTIN 300 MILLIGRAM(S): 400 CAPSULE ORAL at 21:27

## 2018-11-07 RX ADMIN — TIZANIDINE 4 MILLIGRAM(S): 4 TABLET ORAL at 09:21

## 2018-11-07 RX ADMIN — CEFEPIME 100 MILLIGRAM(S): 1 INJECTION, POWDER, FOR SOLUTION INTRAMUSCULAR; INTRAVENOUS at 16:52

## 2018-11-07 RX ADMIN — LIDOCAINE 1 PATCH: 4 CREAM TOPICAL at 11:43

## 2018-11-07 RX ADMIN — HYDROMORPHONE HYDROCHLORIDE 2 MILLIGRAM(S): 2 INJECTION INTRAMUSCULAR; INTRAVENOUS; SUBCUTANEOUS at 15:40

## 2018-11-07 RX ADMIN — MIRTAZAPINE 7.5 MILLIGRAM(S): 45 TABLET, ORALLY DISINTEGRATING ORAL at 21:27

## 2018-11-07 RX ADMIN — MUPIROCIN 1 APPLICATION(S): 20 OINTMENT TOPICAL at 05:30

## 2018-11-07 RX ADMIN — Medication 25 MILLIGRAM(S): at 23:50

## 2018-11-07 RX ADMIN — Medication 25 MILLIGRAM(S): at 16:52

## 2018-11-07 RX ADMIN — BUDESONIDE AND FORMOTEROL FUMARATE DIHYDRATE 2 PUFF(S): 160; 4.5 AEROSOL RESPIRATORY (INHALATION) at 05:30

## 2018-11-07 RX ADMIN — GABAPENTIN 300 MILLIGRAM(S): 400 CAPSULE ORAL at 14:41

## 2018-11-07 RX ADMIN — HUMAN INSULIN 6 UNIT(S): 100 INJECTION, SUSPENSION SUBCUTANEOUS at 09:26

## 2018-11-07 RX ADMIN — HYDROMORPHONE HYDROCHLORIDE 2 MILLIGRAM(S): 2 INJECTION INTRAMUSCULAR; INTRAVENOUS; SUBCUTANEOUS at 16:33

## 2018-11-07 RX ADMIN — ALBUTEROL 2 PUFF(S): 90 AEROSOL, METERED ORAL at 00:56

## 2018-11-07 RX ADMIN — Medication 1.5 MILLIGRAM(S): at 21:27

## 2018-11-07 RX ADMIN — Medication 300 MILLIGRAM(S): at 11:43

## 2018-11-07 RX ADMIN — CEFEPIME 100 MILLIGRAM(S): 1 INJECTION, POWDER, FOR SOLUTION INTRAMUSCULAR; INTRAVENOUS at 05:29

## 2018-11-07 RX ADMIN — QUETIAPINE FUMARATE 25 MILLIGRAM(S): 200 TABLET, FILM COATED ORAL at 21:27

## 2018-11-07 RX ADMIN — TIZANIDINE 4 MILLIGRAM(S): 4 TABLET ORAL at 21:27

## 2018-11-07 RX ADMIN — ALBUTEROL 2 PUFF(S): 90 AEROSOL, METERED ORAL at 11:44

## 2018-11-07 NOTE — PROGRESS NOTE ADULT - SUBJECTIVE AND OBJECTIVE BOX
CC: f/u for fever    Patient reports: she is awake, not verbal but is attentive and smiling.She had a 101.4 during night,     REVIEW OF SYSTEMS:  All other review of systems negative (Comprehensive ROS)    Antimicrobials Day #  :day 2  cefepime   IVPB 1000 milliGRAM(s) IV Intermittent every 12 hours  minocycline 100 milliGRAM(s) Oral two times a day    Other Medications Reviewed    T(F): 98.1 (18 @ 04:38), Max: 101.4 (18 @ 20:10)  HR: 98 (18 @ 04:38)  BP: 106/66 (18 @ 04:38)  RR: 17 (18 @ 04:38)  SpO2: 94% (18 @ 04:38)  Wt(kg): --    PHYSICAL EXAM:  General: alert, no acute distress  Eyes:  anicteric, no conjunctival injection, no discharge  Oropharynx: no lesions or injection 	  Neck: supple, without adenopathy  Lungs: clear to auscultation  Heart: regular rate and rhythm; no murmur, rubs or gallops  Abdomen: soft, nondistended, nontender, without mass or organomegaly, peg  Skin: rash noted on legs improved, rt knee wound dressed, clean, no leg erythema or induration  Extremities: no clubbing, cyanosis, or edema  Neurologic: alert, confused    LAB RESULTS:                        9.9    11.42 )-----------( 384      ( 2018 08:14 )             29.8     11-07    133<L>  |  95<L>  |  16  ----------------------------<  164<H>  3.7   |  24  |  0.46<L>    Ca    8.7      2018 06:37    TPro  5.1<L>  /  Alb  2.8<L>  /  TBili  0.2  /  DBili  x   /  AST  13  /  ALT  7<L>  /  AlkPhos  62  11-07    LIVER FUNCTIONS - ( 2018 06:37 )  Alb: 2.8 g/dL / Pro: 5.1 g/dL / ALK PHOS: 62 U/L / ALT: 7 U/L / AST: 13 U/L / GGT: x           Urinalysis Basic - ( 2018 02:53 )    Color: Yellow / Appearance: Slightly Turbid / S.022 / pH: x  Gluc: x / Ketone: Negative  / Bili: Negative / Urobili: Negative   Blood: x / Protein: 30 mg/dL / Nitrite: Negative   Leuk Esterase: Large / RBC: 14 /hpf / WBC 90 /hpf   Sq Epi: x / Non Sq Epi: 0 /hpf / Bacteria: Few      MICROBIOLOGY:  RECENT CULTURES:   @ 07:05 .Urine Catheterized     >100,000 CFU/ml Pseudomonas aeruginosa       @ 06:14 .Blood Blood-Peripheral     No growth to date.          RADIOLOGY REVIEWED:    < from: Xray Chest 1 View- PORTABLE-Urgent (18 @ 03:37) >  IMPRESSION: Patchy opacities in the right upper lung field, may represent   infection. Chronic lung changes cannot be ruled out entirely.    < end of copied text >

## 2018-11-07 NOTE — ADVANCED PRACTICE NURSE CONSULT - ASSESSMENT
The pt is on a low air-loss surface being turned and positioned as per the nursing documentation. The pt appears fragile with contractures of the upper and lower extremities. A nutrition consult is pending for further evaluation. Staff have applied z-rey boots to off-load the heels as pt a/w b/l stage 1 heel ulcers.  Upon assessment, the pt presents with a healing wound located on the sacrum- the wound measured 1cm x 4cm x 0.2cm- the tissue is pale, pink with fibrinous exudate. As per discussion with the pts sister who was at the bedside, "the wound was much larger." The periwound skin presents with blanchable erythema. no induration or fluctuance were noted.  Pt with skin changes on the b/l buttocks consistent with IAD- Catherine protect was applied to lay down a protective coating against incontinence.  Pt with a healed surgical incision  from the right hip to the right knee- a small linear portion remains open. staff have applied an Adaptic dressing. Pt was followed by Plastic Surgery during a previous admission for this wound- will suggest f/u by Plastics.

## 2018-11-07 NOTE — PROGRESS NOTE ADULT - SUBJECTIVE AND OBJECTIVE BOX
---___---___---___---___---___---___ ---___---___---___---___---___---___---___---___---___---                    <<<  M E D I C A L   A T T E N D I N G    F O L L O W    U P   N O T E  >>>    - Patient seen and examined by me approximately thirty minutes ago.  - In summary, patient is a 68y year old Female who origianlly presented with uti and pneumonia anddeconditioned as well  - Patient today overall doing ok, comfortable, eating OK.     ---___---___---___---___---___---      <<<  MEDICATIONS:  >>>    MEDICATIONS  (STANDING):  ALBUTerol    90 MICROgram(s) HFA Inhaler 2 Puff(s) Inhalation every 6 hours  buDESOnide 160 MICROgram(s)/formoterol 4.5 MICROgram(s) Inhaler 2 Puff(s) Inhalation two times a day  cefepime   IVPB 1000 milliGRAM(s) IV Intermittent every 12 hours  clonazePAM Tablet 1.5 milliGRAM(s) Oral at bedtime  clopidogrel Tablet 75 milliGRAM(s) Oral daily  dextrose 5%. 1000 milliLiter(s) (50 mL/Hr) IV Continuous <Continuous>  dextrose 50% Injectable 12.5 Gram(s) IV Push once  dextrose 50% Injectable 25 Gram(s) IV Push once  dextrose 50% Injectable 25 Gram(s) IV Push once  ergocalciferol Drops 8000 Unit(s) Oral daily  fentaNYL   Patch  12 MICROgram(s)/Hr 1 Patch Transdermal every 72 hours  ferrous    sulfate Liquid 300 milliGRAM(s) Enteral Tube daily  gabapentin 300 milliGRAM(s) Oral every 8 hours  insulin NPH human recombinant 6 Unit(s) SubCutaneous before breakfast  lidocaine   Patch 1 Patch Transdermal daily  minocycline 100 milliGRAM(s) Oral two times a day  mirtazapine 7.5 milliGRAM(s) Oral at bedtime  mupirocin 2% Ointment 1 Application(s) Topical two times a day  pantoprazole   Suspension 40 milliGRAM(s) Oral before breakfast  petrolatum white Ointment 1 Application(s) Topical daily  predniSONE   Tablet 7.5 milliGRAM(s) Oral daily  QUEtiapine 25 milliGRAM(s) Oral at bedtime  sertraline 50 milliGRAM(s) Oral daily  sodium chloride 0.9%. 1000 milliLiter(s) (75 mL/Hr) IV Continuous <Continuous>  tiotropium 18 MICROgram(s) Capsule 1 Capsule(s) Inhalation daily  tiZANidine 4 milliGRAM(s) Oral <User Schedule>      MEDICATIONS  (PRN):  acetaminophen    Suspension .. 650 milliGRAM(s) Oral every 8 hours PRN Mild Pain (1 - 3)  dextrose 40% Gel 15 Gram(s) Oral once PRN Blood Glucose LESS THAN 70 milliGRAM(s)/deciliter  glucagon  Injectable 1 milliGRAM(s) IntraMuscular once PRN Glucose LESS THAN 70 milligrams/deciliter  HYDROmorphone   Tablet 2 milliGRAM(s) Enteral Tube every 4 hours PRN Moderate Pain (4 - 6)  LORazepam     Tablet 0.5 milliGRAM(s) Oral every 12 hours PRN Agitation  nystatin Powder 1 Application(s) Topical two times a day PRN rash/itchiness       ---___---___---___---___---___---     <<<REVIEW OF SYSTEM: >>>  unable to obtain      ---___---___---___---___---___---          <<<  VITAL SIGNS: >>>    T(F): 98.1 (18 @ 04:38), Max: 101.4 (18 @ 20:10)  HR: 98 (18 @ 04:38) (90 - 105)  BP: 106/66 (18 @ 04:38) (106/66 - 140/80)  RR: 17 (18 @ 04:38) (17 - 18)  SpO2: 94% (18 @ 04:38) (93% - 96%)  Wt(kg): --  CAPILLARY BLOOD GLUCOSE      POCT Blood Glucose.: 222 mg/dL (2018 09:24)    I&O's Summary    2018 07:01  -  2018 07:00  --------------------------------------------------------  IN: 1620 mL / OUT: 500 mL / NET: 1120 mL         ---___---___---___---___---___---                       PHYSICAL EXAM:    GEN: A&O X 1 , NAD , comfortable  HEENT: NCAT, PERRL, MMM, no scleral icterus, hearing intact  NECK: Supple, No JVD  CVS: S1S2 , regular , No M/R/G appreciated  PULM: CTA B/L,  no W/R/R appreciated  ABD.: soft. non tender, non distended,  bowel sounds present  Extrem: intact pulses , no edema noted  Derm: sacral decubitus noted   PSYCH: normal mood, no depression, not anxious     ---___---___---___---___---___---     <<<  LAB AND IMAGING: >>>                          9.9    11.42 )-----------( 384      ( 2018 08:14 )             29.8               11-07    133<L>  |  95<L>  |  16  ----------------------------<  164<H>  3.7   |  24  |  0.46<L>    Ca    8.7      2018 06:37    TPro  5.1<L>  /  Alb  2.8<L>  /  TBili  0.2  /  DBili  x   /  AST  13  /  ALT  7<L>  /  AlkPhos  62  11-07    PT/INR - ( 2018 02:55 )   PT: 11.3 sec;   INR: 0.99 ratio         PTT - ( 2018 02:55 )  PTT:34.7 sec       Urinalysis Basic - ( 2018 02:53 )    Color: Yellow / Appearance: Slightly Turbid / S.022 / pH: x  Gluc: x / Ketone: Negative  / Bili: Negative / Urobili: Negative   Blood: x / Protein: 30 mg/dL / Nitrite: Negative   Leuk Esterase: Large / RBC: 14 /hpf / WBC 90 /hpf   Sq Epi: x / Non Sq Epi: 0 /hpf / Bacteria: Few                        [All pertinent / recent available Imaging reports and other labs reviewed]     ---___---___---___---___---___---___ ---___---___---___---___---           <<<  A S S E S S M E N T   A N D   P L A N :  >>>          -GI/DVT Prophylaxis.    --------------------------------------------  Case discussed with  kendra roque will continue meds   Education given on     >>______________________<<      Deniz Bolden .         phone   [phone]

## 2018-11-07 NOTE — DISCHARGE NOTE ADULT - NSFTFSERV1RD_GEN_ALL_CORE
other:/rehabilitation services/observation and assessment/teaching and training/medication teaching and assessment/wound care and assessment

## 2018-11-07 NOTE — DISCHARGE NOTE ADULT - CONDITIONS AT DISCHARGE
Patient stable for discharge. No acute distress noted. Patient discharged with gavin catheter and PEG tube.

## 2018-11-07 NOTE — DISCHARGE NOTE ADULT - CARE PROVIDER_API CALL
Carlito Francis (), Internal Medicine; Pulmonary Disease  [address] [facility]  [address]  [place] [address]  Phone: [phone]  Fax: [phone] Carlito Francis (), Internal Medicine; Pulmonary Disease  [address] [facility]  [address]  [place] [address]  Phone: [phone]  Fax: [phone]    Deniz Bolden (), Family Medicine  [address] [place] [address]  Phone: [phone]  Fax: [phone]    Juan C Montez), Urology  [address] [place] [address]  Phone: [phone]  Fax: [phone]

## 2018-11-07 NOTE — DISCHARGE NOTE ADULT - ADDITIONAL INSTRUCTIONS
Follow up with hematology call for appointment [phone] Follow up with hematology call for appointment [phone].  Follow-up with your primary care physician within 1 week. Call for appointment. Follow up with hematology call for appointment [phone].  Follow-up with your primary care physician within 1 week. Call for appointment.  Follow-up outpatient Urologist Dr. Juan C Montez . Call for appointment.

## 2018-11-07 NOTE — DISCHARGE NOTE ADULT - CARE PROVIDERS DIRECT ADDRESSES
,DirectAddress_Unknown ,DirectAddress_Unknown,DirectAddress_Unknown,[email or web address]

## 2018-11-07 NOTE — ADVANCED PRACTICE NURSE CONSULT - RECOMMEDATIONS
Will recommend the followin. Sacrum. b/l buttocks: routine pericare with Catherine  2. continue with z-rey boots to b/l heels  3. continue with turning and positioning  4. nutrition support as pt condition allows  5. Consider Plastic surgery consult for non-healing sx incision  Tx plan discussed with RN

## 2018-11-07 NOTE — DISCHARGE NOTE ADULT - PLAN OF CARE
Improved symptoms. Continue with medications, inhalers as prescribed by your doctor.  continue aspiration precautions.   Follow-up with your primary care physician within 1 week. Call for appointment. Continue with pain regimen as prescribed by your doctor.  Follow-up with your primary care physician within 1 week. Call for appointment. Continue with chronic indwelling gavin catheter care. Change once monthly.   Follow-up with your primary care physician within 1 week. Call for appointment.  Follow-up outpatient Urology . Call for appointment. Maintain safety, adequate hydration, nutrition, .  Follow-up with your primary care physician within 1 week. Call for appointment. Continue with medications as prescribed by your doctor.  Follow-up with your primary care physician within 1 week. Call for appointment. Continue with Metformin as prescribed by your doctor.  Follow-up with your primary care physician within 1 week. Call for appointment.

## 2018-11-07 NOTE — DISCHARGE NOTE ADULT - HOSPITAL COURSE
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       Problem/Plan - 1:  ·  Problem: Cystitis.  Plan: has been stable  off abx   will need gavin change monthly.     Problem/Plan - 2:  ·  Problem: Encephalopathy, metabolic.  Plan: has been improved    will monitor clinically  monitor.     Problem/Plan - 3:  ·  Problem: CVA (cerebral vascular accident).  Plan: continue  current meds plavix.     Problem/Plan - 4:  ·  Problem: Pulmonary HTN.  Plan: pulmonary following  continue current meds a per  pulmonary.     Problem/Plan - 5:  ·  Problem: Acute hip pain, right.  Plan: on medical marijuana which has been helping the patient much more than the current chronic pains.     Problem/Plan - 6:  Problem: Anxiety. Plan: will continue klonopin zoloft.    Problem/Plan - 7:  ·  Problem: Depression.  Plan: continue remeron and zoloft. Patient is a 68 year old female with advanced Alzheimers dementia (PEG tube in place, chronic gavin catheter in place), bed bound with muscle spasms and sacral decubitus ulcers/ b/l heel ulcers, anxiety, depression, asthma on prednisone, HLD, CVA, UC, right prosthetic hip MRSA infection in 7/2018 s/p pacer in place currently living in STR sent in from rehab for further evaluation of lethargy admitted for metabolic encephalopathy 2' complicated cystitis given chronic gavin catheter.  Encephalopathy acute.  Plan: - lethargy and encephalopathy 2' likely catheter associated urinary tract infection and Aspiration PNA. Pt improved after course of Iv antibioticss. Pt seen by ID. Indwelling gavin catheter changed by urology. Gavin will need to be changed once monthly.  Hyponatremia  likely secondary to  deydration, now improved.  Acute hip pain, right, on pain regimen with lidocaine patch, fentanyl patch per home regimen.  Seen by Ortho : no interventions. Sacral ulcers and heel ulcers , on wound care. Anxiety stable on current regimen of zoloft, remeron, clonazepam and seroquel.  Mild asthma without complication, unspecified whether persistent, stable on  prednisone. Cerebrovascular accident (CVA), unspecified mechanism, on  plavix

## 2018-11-07 NOTE — ADVANCED PRACTICE NURSE CONSULT - REASON FOR CONSULT
Requested by staff to assess skin status of pt a/w multiple pressure ulcers. PMH is noted:  Patient is a 68 year old female with advanced Alzheimers dementia (PEG tube in place, chronic gavin catheter in place), bed bound with muscle spasms and sacral decubitus ulcers/ b/l heel ulcers, anxiety, depression, asthma on prednisone, HLD, CVA, UC, right prosthetic hip MRSA infection in 7/2018 s/p pacer in place currently living in STR sent in from rehab for further evaluation of lethargy. Per  at bedside, patient has had very little energy for the last 48 hours. She is not as responsive as her baseline and is spending most of the sleeping. He is not aware of any fevers, chills. Patient denies any cough or sputum production. Family is seeking change of rehab facility.

## 2018-11-07 NOTE — DISCHARGE NOTE ADULT - MEDICATION SUMMARY - MEDICATIONS TO CHANGE
I will SWITCH the dose or number of times a day I take the medications listed below when I get home from the hospital:    HYDROmorphone 2 mg oral tablet  -- 1 tab(s) by mouth every 4 hours, As needed, breakthrough pain

## 2018-11-07 NOTE — DISCHARGE NOTE ADULT - NS AS DC FOLLOWUP STROKE INST
Influenza vaccination (VIS Pub Date: August 7, 2015)/Smoking Cessation/Stroke (includes: TIA/SAH/ICH/Ischemic Stroke)/Pneumonia Pneumonia/Smoking Cessation/Influenza vaccination (VIS Pub Date: August 7, 2015) Influenza vaccination (VIS Pub Date: August 7, 2015)/Pneumonia

## 2018-11-07 NOTE — DISCHARGE NOTE ADULT - SECONDARY DIAGNOSIS.
Acute hip pain, right Altered mental state PEG (percutaneous endoscopic gastrostomy) status CVA (cerebral vascular accident) Asthma Cystitis Encephalopathy, metabolic Hyponatremia Anxiety Hyperglycemia, drug-induced

## 2018-11-07 NOTE — DISCHARGE NOTE ADULT - MEDICATION SUMMARY - MEDICATIONS TO TAKE
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  -- and irrigation set.  Dispense for 30 day supply.  Dx; PEG tube status(Z93.1), CVA (I63.9), Aspiration PNA (J69.0)  -- Indication: For supplies    freetext medication  - controlled substance  -- medical marijuana 1 milliliter(s) under tongue . 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), Stage 3 Sacral  Pressure Ulcer (L89.153), and Stage 1 pressure ulcers (L89.899)  to left malleolus, left heel, right foot  FOSTER: #99  -- Indication: For Equipment    Vital AF via PEG tube    -- bolus volume of 240cc every 4 hours (Total 4 cans a day)  Provides 1152kcal and 72g protein (24kcal/kg and 1.5g/kg protein based on current wt of 47.8kg)   Dx; PEG tube status(Z93.1), CVA (I63.9), Aspiration PNA (J69.0)  -- Indication: For PEG tube feeding    predniSONE 2.5 mg oral tablet  -- 3 tab(s) by gastrostomy tube once a day  at 6 am   -- Indication: For Mild asthma without complication, unspecified whether persistent    acetaminophen 160 mg/5 mL oral suspension  -- 20.31 milliliter(s) by gastrostomy tube every 8 hours, As Needed , Mild Pain (1 - 3)   -- Indication: For Mild pain    HYDROmorphone 2 mg oral tablet  -- 1 tab(s) by gastrostomy tube once a day, As Needed for breakthrough MDD:1  -- Indication: For breakthrough pain    fentaNYL 12 mcg/hr transdermal film, extended release  -- 1 patch by transdermal patch every 72 hours  -- Indication: For Chronic pain    clonazePAM 0.5 mg oral tablet  -- 3 tab(s) by mouth once a day (at bedtime)  -- Indication: For Anxiety    gabapentin 300 mg oral capsule  -- 1 cap(s) by gastrostomy tube every 8 hours   -- Indication: For neuropathic pain    mirtazapine 7.5 mg oral tablet  -- 1 tab(s) by gastrostomy tube once a day (at bedtime)   -- Indication: For Depression    sertraline 50 mg oral tablet  -- 1 tab(s) by gastrostomy tube once a day   -- Indication: For Depression    metFORMIN 500 mg oral tablet  -- 1 tab(s) by mouth once a day (at bedtime)   -- Check with your doctor before becoming pregnant.  Do not drink alcoholic beverages when taking this medication.  It is very important that you take or use this exactly as directed.  Do not skip doses or discontinue unless directed by your doctor.  Obtain medical advice before taking any non-prescription drugs as some may affect the action of this medication.  Take with food or milk.    -- Indication: For Hyperglycemia    clopidogrel 75 mg oral tablet  -- 1 tab(s) by gastrostomy tube once a day (in the morning)   -- Indication: For Protect from clot formation    QUEtiapine 25 mg oral tablet  -- 1 tab(s) by gastrostomy tube once a day (at bedtime)   -- Indication: For Depression    ipratropium-albuterol 0.5 mg-2.5 mg/3 mLinhalation solution  -- 3 milliliter(s) inhaled every 6 hours as needed for shortness of breath  -- Indication: For shortness of breath    Spiriva 18 mcg inhalation capsule  -- 1 cap(s) inhaled once a day (in the morning)  -- Indication: For Mild asthma without complication, unspecified whether persistent    budesonide-formoterol 160 mcg-4.5 mcg/inh inhalation aerosol  -- 2 puff(s) inhaled 2 times a day   -- Indication: For Mild asthma without complication, unspecified whether persistent    lidocaine 5% topical film  -- Apply on skin to affected area once a day, As Needed. May use  Over the Counter alternate .  -- Indication: For topical analgesia    clobetasol 0.05% topical ointment  -- 1 application on skin 2 times a day  -- Indication: For rash    nystatin 100,000 units/g topical powder  -- 1 application on skin 2 times a day, As needed, rash/itchiness  -- Indication: For rash/itchiness    triamterene 100 mg oral capsule  -- 1 cap(s) by gastrostomy tube every other day   -- Avoid prolonged or excessive exposure to direct and/or artificial sunlight while taking this medication.  It is very important that you take or use this exactly as directed.  Do not skip doses or discontinue unless directed by your doctor.  May cause drowsiness or dizziness.    -- Indication: For fluid overload    ferrous sulfate 300 mg/5 mL (60 mg elemental iron) oral liquid  -- 5 milliliter(s) by gastrostomy tube once a day   -- Indication: For supplement    magnesium oxide 400 mg (241.3 mg elemental magnesium) oral tablet  -- 1 tab(s) by gastrostomy tube every other day   -- Indication: For supplement    potassium chloride 10 mEq oral capsule, extended release  -- 1 cap(s) by gastrostomy tube every other day   -- It is very important that you take or use this exactly as directed.  Do not skip doses or discontinue unless directed by your doctor.  Medication should be taken with plenty of water.  Swallow whole.  Do not crush.  Take with food or milk.    -- Indication: For supplement     tiZANidine 4 mg oral tablet  -- 1 tab(s) by gastrostomy tube 2 times a day   -- Indication: For Muscle relaxant    ocular lubricant ophthalmic solution  -- 1 drop(s) to each affected eye 3 times a day as needed for dry eyes  -- Indication: For Dry eyes     Protonix 40 mg oral granule, delayed release  -- 1 each by PEG tube once a day  -- Indication: For Protect from indigestion     minocycline 100 mg oral capsule  -- 1 cap(s) by gastrostomy tube 2 times a day   -- Indication: For Infected ulcer    ergocalciferol 8000 intl units/mL oral solution  -- 1 milliliter(s) by gastrostomy tube once a day   -- Indication: For supplement I will START or STAY ON the medications listed below when I get home from the hospital:    Jah lift  -- Aspiration Pneumonia (ICD10 J69.0), CVA (I63.9), Acute pain right hip (M25.551), Incontinent Associated Dermatitis (L24.9), Stage 3 Sacral  Pressure Ulcer (L89.153), and Stage 1 pressure ulcers (L89.899)  to left malleolus, left heel, right foot  FOSTER: #99  -- Indication: For Assistive device    PEG tube feeding supplies.  -- and irrigation set.  Dispense for 30 day supply.  Dx; PEG tube status(Z93.1), CVA (I63.9), Aspiration PNA (J69.0)  -- Indication: For supplies    freetext medication  - controlled substance  -- medical marijuana 1 milliliter(s) under tongue . 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), Stage 3 Sacral  Pressure Ulcer (L89.153), and Stage 1 pressure ulcers (L89.899)  to left malleolus, left heel, right foot  FOSTER: #99  -- Indication: For Equipment    Vital AF via PEG tube    -- bolus volume of 240cc every 4 hours (Total 4 cans a day)  Provides 1152kcal and 72g protein (24kcal/kg and 1.5g/kg protein based on current wt of 47.8kg)   Dx; PEG tube status(Z93.1), CVA (I63.9), Aspiration PNA (J69.0)  -- Indication: For PEG tube feeding    predniSONE 2.5 mg oral tablet  -- 3 tab(s) by gastrostomy tube once a day  at 6 am   -- Indication: For Mild asthma without complication, unspecified whether persistent    acetaminophen 160 mg/5 mL oral suspension  -- 20.31 milliliter(s) by gastrostomy tube every 8 hours, As Needed , Mild Pain (1 - 3)   -- Indication: For Mild pain    fentaNYL 12 mcg/hr transdermal film, extended release  -- 1 patch by transdermal patch every 72 hours MDD:1 patch every 3 days  -- Indication: For Chronic pain    HYDROmorphone 2 mg oral tablet  -- 1 tab(s) by gastrostomy tube once a day, As Needed for breakthrough MDD:1  -- Indication: For breakthrough pain    clonazePAM 0.5 mg oral tablet  -- 3 tab(s) by mouth once a day (at bedtime). Do not administer if patient is sedated. MDD:1.5 mg  -- Indication: For Anxiety    gabapentin 300 mg oral capsule  -- 1 cap(s) by gastrostomy tube every 8 hours   -- Indication: For neuropathic pain    mirtazapine 7.5 mg oral tablet  -- 1 tab(s) by gastrostomy tube once a day (at bedtime)   -- Indication: For Depression    sertraline 50 mg oral tablet  -- 1 tab(s) by gastrostomy tube once a day   -- Indication: For Depression    metFORMIN 500 mg oral tablet  -- 1 tab(s) by mouth once a day (at bedtime)   -- Check with your doctor before becoming pregnant.  Do not drink alcoholic beverages when taking this medication.  It is very important that you take or use this exactly as directed.  Do not skip doses or discontinue unless directed by your doctor.  Obtain medical advice before taking any non-prescription drugs as some may affect the action of this medication.  Take with food or milk.    -- Indication: For Hyperglycemia    clopidogrel 75 mg oral tablet  -- 1 tab(s) by gastrostomy tube once a day (in the morning)   -- Indication: For Protect from clot formation    QUEtiapine 25 mg oral tablet  -- 1 tab(s) by gastrostomy tube once a day (at bedtime)   -- Indication: For Depression    ipratropium-albuterol 0.5 mg-2.5 mg/3 mLinhalation solution  -- 3 milliliter(s) inhaled every 6 hours as needed for shortness of breath  -- Indication: For shortness of breath    Spiriva 18 mcg inhalation capsule  -- 1 cap(s) inhaled once a day (in the morning)  -- Indication: For Mild asthma without complication, unspecified whether persistent    budesonide-formoterol 160 mcg-4.5 mcg/inh inhalation aerosol  -- 2 puff(s) inhaled 2 times a day   -- Indication: For Mild asthma without complication, unspecified whether persistent    lidocaine 5% topical film  -- Apply on skin to affected area once a day, As Needed. May use  Over the Counter alternate .  -- Indication: For topical analgesia    clobetasol 0.05% topical ointment  -- 1 application on skin 2 times a day  -- Indication: For rash    nystatin 100,000 units/g topical powder  -- 1 application on skin 2 times a day, As needed, rash/itchiness  -- Indication: For rash/itchiness    triamterene 100 mg oral capsule  -- 1 cap(s) by gastrostomy tube every other day   -- Avoid prolonged or excessive exposure to direct and/or artificial sunlight while taking this medication.  It is very important that you take or use this exactly as directed.  Do not skip doses or discontinue unless directed by your doctor.  May cause drowsiness or dizziness.    -- Indication: For fluid overload    ferrous sulfate 300 mg/5 mL (60 mg elemental iron) oral liquid  -- 5 milliliter(s) by gastrostomy tube once a day   -- Indication: For supplement    magnesium oxide 400 mg (241.3 mg elemental magnesium) oral tablet  -- 1 tab(s) by gastrostomy tube every other day   -- Indication: For supplement    potassium chloride 10 mEq oral capsule, extended release  -- 1 cap(s) by gastrostomy tube every other day   -- It is very important that you take or use this exactly as directed.  Do not skip doses or discontinue unless directed by your doctor.  Medication should be taken with plenty of water.  Swallow whole.  Do not crush.  Take with food or milk.    -- Indication: For supplement     tiZANidine 4 mg oral tablet  -- 1 tab(s) by gastrostomy tube 2 times a day   -- Indication: For Muscle relaxant    ocular lubricant ophthalmic solution  -- 1 drop(s) to each affected eye 3 times a day as needed for dry eyes  -- Indication: For Dry eyes     Protonix 40 mg oral granule, delayed release  -- 1 each by PEG tube once a day  -- Indication: For Acid reflux    NexIUM 20 mg oral delayed release capsule  -- 1 cap(s) by gastrostomy tube once a day. This alternative to pantoprazole until it is available.   -- Indication: For Acid reflux    minocycline 100 mg oral capsule  -- 1 cap(s) by gastrostomy tube 2 times a day   -- Indication: For Infected ulcer    ergocalciferol 8000 intl units/mL oral solution  -- 1 milliliter(s) by gastrostomy tube once a day   -- Indication: For supplement

## 2018-11-07 NOTE — DISCHARGE NOTE ADULT - PATIENT PORTAL LINK FT
You can access [email or web address], offered by [facility], by registering with the following website: [email or web address]

## 2018-11-07 NOTE — DISCHARGE NOTE ADULT - CARE PLAN
Principal Discharge DX:	Pneumonia  Secondary Diagnosis:	Acute hip pain, right  Secondary Diagnosis:	Altered mental state  Secondary Diagnosis:	PEG (percutaneous endoscopic gastrostomy) status  Secondary Diagnosis:	CVA (cerebral vascular accident)  Secondary Diagnosis:	Asthma Principal Discharge DX:	Pneumonia  Secondary Diagnosis:	Acute hip pain, right  Secondary Diagnosis:	Cystitis  Secondary Diagnosis:	Encephalopathy, metabolic  Secondary Diagnosis:	Hyponatremia Principal Discharge DX:	Pneumonia  Goal:	Improved symptoms.  Assessment and plan of treatment:	Continue with medications, inhalers as prescribed by your doctor.  continue aspiration precautions.   Follow-up with your primary care physician within 1 week. Call for appointment.  Secondary Diagnosis:	Acute hip pain, right  Assessment and plan of treatment:	Continue with pain regimen as prescribed by your doctor.  Follow-up with your primary care physician within 1 week. Call for appointment.  Secondary Diagnosis:	Cystitis  Assessment and plan of treatment:	Continue with chronic indwelling gavin catheter care. Change once monthly.   Follow-up with your primary care physician within 1 week. Call for appointment.  Follow-up outpatient Urology . Call for appointment.  Secondary Diagnosis:	Encephalopathy, metabolic  Assessment and plan of treatment:	Maintain safety, adequate hydration, nutrition, .  Follow-up with your primary care physician within 1 week. Call for appointment.  Secondary Diagnosis:	Anxiety  Assessment and plan of treatment:	Continue with medications as prescribed by your doctor.  Follow-up with your primary care physician within 1 week. Call for appointment. Principal Discharge DX:	Pneumonia  Goal:	Improved symptoms.  Assessment and plan of treatment:	Continue with medications, inhalers as prescribed by your doctor.  continue aspiration precautions.   Follow-up with your primary care physician within 1 week. Call for appointment.  Secondary Diagnosis:	Acute hip pain, right  Assessment and plan of treatment:	Continue with pain regimen as prescribed by your doctor.  Follow-up with your primary care physician within 1 week. Call for appointment.  Secondary Diagnosis:	Cystitis  Assessment and plan of treatment:	Continue with chronic indwelling gavin catheter care. Change once monthly.   Follow-up with your primary care physician within 1 week. Call for appointment.  Follow-up outpatient Urology . Call for appointment.  Secondary Diagnosis:	Encephalopathy, metabolic  Assessment and plan of treatment:	Maintain safety, adequate hydration, nutrition, .  Follow-up with your primary care physician within 1 week. Call for appointment.  Secondary Diagnosis:	Anxiety  Assessment and plan of treatment:	Continue with medications as prescribed by your doctor.  Follow-up with your primary care physician within 1 week. Call for appointment.  Secondary Diagnosis:	Hyperglycemia, drug-induced  Assessment and plan of treatment:	Continue with Metformin as prescribed by your doctor.  Follow-up with your primary care physician within 1 week. Call for appointment.

## 2018-11-07 NOTE — PROGRESS NOTE ADULT - ASSESSMENT
mentaobilic encephalopathy  continue peg feeds and iv abx  id folowing follow labs   uti pneumonia  as above   chronic pain continue analgesis has cemented right hip and  hx of fractured left hip   patient allowed to use medical marijuana from home   dementia   hyperglycemia  continue finger sticks  copd/ asthma xcotniue budesonide  sacral decubitus wound care following

## 2018-11-08 ENCOUNTER — APPOINTMENT (OUTPATIENT)
Dept: ORTHOPEDIC SURGERY | Facility: CLINIC | Age: 68
End: 2018-11-08

## 2018-11-08 DIAGNOSIS — I63.9 CEREBRAL INFARCTION, UNSPECIFIED: ICD-10-CM

## 2018-11-08 LAB
ANION GAP SERPL CALC-SCNC: 10 MMOL/L — SIGNIFICANT CHANGE UP (ref 5–17)
BUN SERPL-MCNC: 14 MG/DL — SIGNIFICANT CHANGE UP (ref 7–23)
CALCIUM SERPL-MCNC: 8.4 MG/DL — SIGNIFICANT CHANGE UP (ref 8.4–10.5)
CHLORIDE SERPL-SCNC: 95 MMOL/L — LOW (ref 96–108)
CO2 SERPL-SCNC: 22 MMOL/L — SIGNIFICANT CHANGE UP (ref 22–31)
CREAT SERPL-MCNC: 0.41 MG/DL — LOW (ref 0.5–1.3)
GLUCOSE BLDC GLUCOMTR-MCNC: 114 MG/DL — HIGH (ref 70–99)
GLUCOSE BLDC GLUCOMTR-MCNC: 148 MG/DL — HIGH (ref 70–99)
GLUCOSE BLDC GLUCOMTR-MCNC: 219 MG/DL — HIGH (ref 70–99)
GLUCOSE SERPL-MCNC: 189 MG/DL — HIGH (ref 70–99)
HCT VFR BLD CALC: 28.3 % — LOW (ref 34.5–45)
HGB BLD-MCNC: 9.8 G/DL — LOW (ref 11.5–15.5)
MCHC RBC-ENTMCNC: 31 PG — SIGNIFICANT CHANGE UP (ref 27–34)
MCHC RBC-ENTMCNC: 34.6 GM/DL — SIGNIFICANT CHANGE UP (ref 32–36)
MCV RBC AUTO: 89.6 FL — SIGNIFICANT CHANGE UP (ref 80–100)
PLATELET # BLD AUTO: 341 K/UL — SIGNIFICANT CHANGE UP (ref 150–400)
POTASSIUM SERPL-MCNC: 3.4 MMOL/L — LOW (ref 3.5–5.3)
POTASSIUM SERPL-SCNC: 3.4 MMOL/L — LOW (ref 3.5–5.3)
RBC # BLD: 3.16 M/UL — LOW (ref 3.8–5.2)
RBC # FLD: 14.9 % — HIGH (ref 10.3–14.5)
SODIUM SERPL-SCNC: 127 MMOL/L — LOW (ref 135–145)
WBC # BLD: 8.08 K/UL — SIGNIFICANT CHANGE UP (ref 3.8–10.5)
WBC # FLD AUTO: 8.08 K/UL — SIGNIFICANT CHANGE UP (ref 3.8–10.5)

## 2018-11-08 RX ORDER — INSULIN LISPRO 100/ML
VIAL (ML) SUBCUTANEOUS EVERY 6 HOURS
Qty: 0 | Refills: 0 | Status: DISCONTINUED | OUTPATIENT
Start: 2018-11-08 | End: 2018-11-19

## 2018-11-08 RX ORDER — POTASSIUM CHLORIDE 20 MEQ
20 PACKET (EA) ORAL ONCE
Qty: 0 | Refills: 0 | Status: COMPLETED | OUTPATIENT
Start: 2018-11-08 | End: 2018-11-08

## 2018-11-08 RX ADMIN — MUPIROCIN 1 APPLICATION(S): 20 OINTMENT TOPICAL at 05:18

## 2018-11-08 RX ADMIN — CEFEPIME 100 MILLIGRAM(S): 1 INJECTION, POWDER, FOR SOLUTION INTRAMUSCULAR; INTRAVENOUS at 18:48

## 2018-11-08 RX ADMIN — Medication 0.5 MILLIGRAM(S): at 05:24

## 2018-11-08 RX ADMIN — GABAPENTIN 300 MILLIGRAM(S): 400 CAPSULE ORAL at 05:18

## 2018-11-08 RX ADMIN — ALBUTEROL 2 PUFF(S): 90 AEROSOL, METERED ORAL at 23:14

## 2018-11-08 RX ADMIN — BUDESONIDE AND FORMOTEROL FUMARATE DIHYDRATE 2 PUFF(S): 160; 4.5 AEROSOL RESPIRATORY (INHALATION) at 18:07

## 2018-11-08 RX ADMIN — HYDROMORPHONE HYDROCHLORIDE 2 MILLIGRAM(S): 2 INJECTION INTRAMUSCULAR; INTRAVENOUS; SUBCUTANEOUS at 23:13

## 2018-11-08 RX ADMIN — SODIUM CHLORIDE 75 MILLILITER(S): 9 INJECTION INTRAMUSCULAR; INTRAVENOUS; SUBCUTANEOUS at 10:44

## 2018-11-08 RX ADMIN — BUDESONIDE AND FORMOTEROL FUMARATE DIHYDRATE 2 PUFF(S): 160; 4.5 AEROSOL RESPIRATORY (INHALATION) at 05:18

## 2018-11-08 RX ADMIN — MINOCYCLINE HYDROCHLORIDE 100 MILLIGRAM(S): 45 TABLET, EXTENDED RELEASE ORAL at 05:18

## 2018-11-08 RX ADMIN — HUMAN INSULIN 6 UNIT(S): 100 INJECTION, SUSPENSION SUBCUTANEOUS at 08:24

## 2018-11-08 RX ADMIN — MIRTAZAPINE 7.5 MILLIGRAM(S): 45 TABLET, ORALLY DISINTEGRATING ORAL at 22:37

## 2018-11-08 RX ADMIN — ALBUTEROL 2 PUFF(S): 90 AEROSOL, METERED ORAL at 12:18

## 2018-11-08 RX ADMIN — Medication 2: at 09:03

## 2018-11-08 RX ADMIN — HYDROMORPHONE HYDROCHLORIDE 2 MILLIGRAM(S): 2 INJECTION INTRAMUSCULAR; INTRAVENOUS; SUBCUTANEOUS at 15:30

## 2018-11-08 RX ADMIN — HYDROMORPHONE HYDROCHLORIDE 2 MILLIGRAM(S): 2 INJECTION INTRAMUSCULAR; INTRAVENOUS; SUBCUTANEOUS at 14:59

## 2018-11-08 RX ADMIN — Medication 1.5 MILLIGRAM(S): at 22:37

## 2018-11-08 RX ADMIN — PANTOPRAZOLE SODIUM 40 MILLIGRAM(S): 20 TABLET, DELAYED RELEASE ORAL at 05:19

## 2018-11-08 RX ADMIN — ALBUTEROL 2 PUFF(S): 90 AEROSOL, METERED ORAL at 05:18

## 2018-11-08 RX ADMIN — TIOTROPIUM BROMIDE 1 CAPSULE(S): 18 CAPSULE ORAL; RESPIRATORY (INHALATION) at 12:18

## 2018-11-08 RX ADMIN — MINOCYCLINE HYDROCHLORIDE 100 MILLIGRAM(S): 45 TABLET, EXTENDED RELEASE ORAL at 18:15

## 2018-11-08 RX ADMIN — MUPIROCIN 1 APPLICATION(S): 20 OINTMENT TOPICAL at 18:15

## 2018-11-08 RX ADMIN — HYDROMORPHONE HYDROCHLORIDE 2 MILLIGRAM(S): 2 INJECTION INTRAMUSCULAR; INTRAVENOUS; SUBCUTANEOUS at 10:10

## 2018-11-08 RX ADMIN — HYDROMORPHONE HYDROCHLORIDE 2 MILLIGRAM(S): 2 INJECTION INTRAMUSCULAR; INTRAVENOUS; SUBCUTANEOUS at 23:43

## 2018-11-08 RX ADMIN — GABAPENTIN 300 MILLIGRAM(S): 400 CAPSULE ORAL at 22:37

## 2018-11-08 RX ADMIN — TIZANIDINE 4 MILLIGRAM(S): 4 TABLET ORAL at 22:30

## 2018-11-08 RX ADMIN — Medication 20 MILLIEQUIVALENT(S): at 13:43

## 2018-11-08 RX ADMIN — CEFEPIME 100 MILLIGRAM(S): 1 INJECTION, POWDER, FOR SOLUTION INTRAMUSCULAR; INTRAVENOUS at 05:18

## 2018-11-08 RX ADMIN — FENTANYL CITRATE 1 PATCH: 50 INJECTION INTRAVENOUS at 20:40

## 2018-11-08 RX ADMIN — HYDROMORPHONE HYDROCHLORIDE 2 MILLIGRAM(S): 2 INJECTION INTRAMUSCULAR; INTRAVENOUS; SUBCUTANEOUS at 09:37

## 2018-11-08 RX ADMIN — Medication 300 MILLIGRAM(S): at 12:18

## 2018-11-08 RX ADMIN — GABAPENTIN 300 MILLIGRAM(S): 400 CAPSULE ORAL at 13:43

## 2018-11-08 RX ADMIN — LIDOCAINE 1 PATCH: 4 CREAM TOPICAL at 12:32

## 2018-11-08 RX ADMIN — ERGOCALCIFEROL 8000 UNIT(S): 1.25 CAPSULE ORAL at 18:15

## 2018-11-08 RX ADMIN — CLOPIDOGREL BISULFATE 75 MILLIGRAM(S): 75 TABLET, FILM COATED ORAL at 12:18

## 2018-11-08 RX ADMIN — Medication 7.5 MILLIGRAM(S): at 05:18

## 2018-11-08 RX ADMIN — LIDOCAINE 1 PATCH: 4 CREAM TOPICAL at 20:36

## 2018-11-08 RX ADMIN — QUETIAPINE FUMARATE 25 MILLIGRAM(S): 200 TABLET, FILM COATED ORAL at 22:37

## 2018-11-08 RX ADMIN — ALBUTEROL 2 PUFF(S): 90 AEROSOL, METERED ORAL at 18:07

## 2018-11-08 RX ADMIN — TIZANIDINE 4 MILLIGRAM(S): 4 TABLET ORAL at 09:05

## 2018-11-08 RX ADMIN — SERTRALINE 50 MILLIGRAM(S): 25 TABLET, FILM COATED ORAL at 12:18

## 2018-11-08 NOTE — PHYSICAL THERAPY INITIAL EVALUATION ADULT - IMPAIRMENTS CONTRIBUTING IMPAIRED BED MOBILITY, REHAB EVAL
decreased ROM/decreased strength/pain/decreased flexibility/impaired postural control/impaired balance/cognition

## 2018-11-08 NOTE — PHYSICAL THERAPY INITIAL EVALUATION ADULT - ADDITIONAL COMMENTS
Prior to R femur surgeries in late July/early August, pt ambulating with rolling walker and assist, had HHA 7 days/week from 5-10pm, family assisted at all other times as needed. Pt d/c to rehab following surgeries. Per pt's spouse, pt's mobility has declined since d/c to rehab. At home, pt owns rolling walker, shower chair, transport chair.

## 2018-11-08 NOTE — DIETITIAN INITIAL EVALUATION ADULT. - PERTINENT MEDS FT
prednisone, zoloft, protonix, humulin, iron, vit D, humalog prednisone, zoloft, protonix, humulin, iron, vit D2, humalog sliding scale

## 2018-11-08 NOTE — PHYSICAL THERAPY INITIAL EVALUATION ADULT - PERTINENT HX OF CURRENT PROBLEM, REHAB EVAL
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 Pt with h/o L acetabular fx, L2 compression fx, R femur ORIF on 8/4 (all from previous hospital admissions).

## 2018-11-08 NOTE — PROGRESS NOTE ADULT - SUBJECTIVE AND OBJECTIVE BOX
---___---___---___---___---___---___ ---___---___---___---___---___---___---___---___---___---                    <<<  M E D I C A L   A T T E N D I N G    F O L L O W    U P   N O T E  >>>    - Patient seen and examined by me approximately thirty minutes ago.  - In summary, patient is a 68y year old Female who origianlly presented with metabolic encephalopathy  - Patient today overall doing ok, comfortable, eating OK.     ---___---___---___---___---___---      <<<  MEDICATIONS:  >>>    MEDICATIONS  (STANDING):  ALBUTerol    90 MICROgram(s) HFA Inhaler 2 Puff(s) Inhalation every 6 hours  buDESOnide 160 MICROgram(s)/formoterol 4.5 MICROgram(s) Inhaler 2 Puff(s) Inhalation two times a day  cefepime   IVPB 1000 milliGRAM(s) IV Intermittent every 12 hours  clonazePAM Tablet 1.5 milliGRAM(s) Oral at bedtime  clopidogrel Tablet 75 milliGRAM(s) Oral daily  dextrose 5%. 1000 milliLiter(s) (50 mL/Hr) IV Continuous <Continuous>  dextrose 50% Injectable 12.5 Gram(s) IV Push once  dextrose 50% Injectable 25 Gram(s) IV Push once  dextrose 50% Injectable 25 Gram(s) IV Push once  ergocalciferol Drops 8000 Unit(s) Oral daily  fentaNYL   Patch  12 MICROgram(s)/Hr 1 Patch Transdermal every 72 hours  ferrous    sulfate Liquid 300 milliGRAM(s) Enteral Tube daily  gabapentin 300 milliGRAM(s) Oral every 8 hours  insulin lispro (HumaLOG) corrective regimen sliding scale   SubCutaneous every 6 hours  insulin NPH human recombinant 6 Unit(s) SubCutaneous before breakfast  lidocaine   Patch 1 Patch Transdermal daily  medical marijuana oil 1 milliLiter(s) 1 milliLiter(s) SubLingual <User Schedule>  minocycline 100 milliGRAM(s) Oral two times a day  mirtazapine 7.5 milliGRAM(s) Oral at bedtime  mupirocin 2% Ointment 1 Application(s) Topical two times a day  pantoprazole   Suspension 40 milliGRAM(s) Oral before breakfast  predniSONE   Tablet 7.5 milliGRAM(s) Oral daily  QUEtiapine 25 milliGRAM(s) Oral at bedtime  sertraline 50 milliGRAM(s) Oral daily  sodium chloride 0.9%. 1000 milliLiter(s) (75 mL/Hr) IV Continuous <Continuous>  tiotropium 18 MICROgram(s) Capsule 1 Capsule(s) Inhalation daily  tiZANidine 4 milliGRAM(s) Oral <User Schedule>      MEDICATIONS  (PRN):  acetaminophen    Suspension .. 650 milliGRAM(s) Oral every 8 hours PRN Mild Pain (1 - 3)  dextrose 40% Gel 15 Gram(s) Oral once PRN Blood Glucose LESS THAN 70 milliGRAM(s)/deciliter  glucagon  Injectable 1 milliGRAM(s) IntraMuscular once PRN Glucose LESS THAN 70 milligrams/deciliter  HYDROmorphone   Tablet 2 milliGRAM(s) Enteral Tube every 4 hours PRN Moderate Pain (4 - 6)  LORazepam     Tablet 0.5 milliGRAM(s) Oral every 12 hours PRN Agitation  nystatin Powder 1 Application(s) Topical two times a day PRN rash/itchiness       ---___---___---___---___---___---     <<<REVIEW OF SYSTEM: >>>    GEN: no fever, no chills, no pain  RESP: no SOB, no cough, no sputum  CVS: no chest pain, no palpitations, no edema  GI: no abdominal pain, no nausea, no vomiting, no constipation, no diarrhea  : no dysurea, no frequency  NEURO: no headache, no dizziness  PSYCH: no depression, not anxious  Derm : no itching, no rash     ---___---___---___---___---___---          <<<  VITAL SIGNS: >>>    T(F): 98.1 (11-08-18 @ 05:05), Max: 98.6 (11-07-18 @ 21:49)  HR: 94 (11-08-18 @ 05:05) (94 - 94)  BP: 115/66 (11-08-18 @ 05:05) (108/66 - 115/66)  RR: 18 (11-08-18 @ 05:05) (18 - 18)  SpO2: 95% (11-08-18 @ 05:05) (94% - 95%)  Wt(kg): --  CAPILLARY BLOOD GLUCOSE      POCT Blood Glucose.: 219 mg/dL (08 Nov 2018 08:11)    I&O's Summary    07 Nov 2018 07:01  -  08 Nov 2018 07:00  --------------------------------------------------------  IN: 0 mL / OUT: 950 mL / NET: -950 mL         ---___---___---___---___---___---                       PHYSICAL EXAM:    GEN: A&O X 3 , NAD , comfortable  HEENT: NCAT, PERRL, MMM, no scleral icterus, hearing intact  NECK: Supple, No JVD  CVS: S1S2 , regular , No M/R/G appreciated  PULM: CTA B/L,  no W/R/R appreciated  ABD.: soft. non tender, non distended,  bowel sounds present  Extrem: intact pulses , no edema noted  Derm:  rash noted to the arms   PSYCH: normal mood, no depression, not anxious     ---___---___---___---___---___---     <<<  LAB AND IMAGING: >>>                          9.8    8.08  )-----------( 341      ( 08 Nov 2018 08:13 )             28.3               11-08    127<L>  |  95<L>  |  14  ----------------------------<  189<H>  3.4<L>   |  22  |  0.41<L>    Ca    8.4      08 Nov 2018 06:20    TPro  5.1<L>  /  Alb  2.8<L>  /  TBili  0.2  /  DBili  x   /  AST  13  /  ALT  7<L>  /  AlkPhos  62  11-07                               [All pertinent / recent available Imaging reports and other labs reviewed]     ---___---___---___---___---___---___ ---___---___---___---___---           <<<  A S S E S S M E N T   A N D   P L A N :  >>>          -GI/DVT Prophylaxis.    --------------------------------------------  Case discussed with   Education given on     >>______________________<<      Deniz Bolden .         phone   [phone]

## 2018-11-08 NOTE — STUDENT SIGN OFF DOCUMENT - DOCUMENTS STUDENTS ARE SIGNED OFF ON
Dietitian Initial Evaluation/Provider Contact Note/Patient Profile Input and Output/Vital Signs/Plan of Care/Assessment and Intervention

## 2018-11-08 NOTE — PROGRESS NOTE ADULT - SUBJECTIVE AND OBJECTIVE BOX
CC: f/u for fever    Patient reports: she is more awake,no specific complaints.Her skin looks improved c/w admission,? eczema.    REVIEW OF SYSTEMS:  All other review of systems negative (Comprehensive ROS)    Antimicrobials Day #  :day 3  cefepime   IVPB 1000 milliGRAM(s) IV Intermittent every 12 hours  minocycline 100 milliGRAM(s) Oral two times a day    Other Medications Reviewed    T(F): 98.7 (11-08-18 @ 11:29), Max: 98.7 (11-08-18 @ 11:29)  HR: 92 (11-08-18 @ 11:29)  BP: 112/63 (11-08-18 @ 11:29)  RR: 18 (11-08-18 @ 11:29)  SpO2: 96% (11-08-18 @ 11:29)  Wt(kg): --    PHYSICAL EXAM:  General: alert, no acute distress  Eyes:  anicteric, no conjunctival injection, no discharge  Oropharynx: no lesions or injection 	  Neck: supple, without adenopathy  Lungs: clear to auscultation  Heart: regular rate and rhythm; no murmur, rubs or gallops  Abdomen: soft, nondistended, nontender, without mass or organomegaly, peg in place  Skin: faint chest rash  Extremities: no clubbing, cyanosis, or edema  Neurologic: alert, oriented, moves all extremities  RT leg incision is intact save for distal aspect, superficial dehiscence, clean  LAB RESULTS:                        9.8    8.08  )-----------( 341      ( 08 Nov 2018 08:13 )             28.3     11-08    127<L>  |  95<L>  |  14  ----------------------------<  189<H>  3.4<L>   |  22  |  0.41<L>    Ca    8.4      08 Nov 2018 06:20    TPro  5.1<L>  /  Alb  2.8<L>  /  TBili  0.2  /  DBili  x   /  AST  13  /  ALT  7<L>  /  AlkPhos  62  11-07    LIVER FUNCTIONS - ( 07 Nov 2018 06:37 )  Alb: 2.8 g/dL / Pro: 5.1 g/dL / ALK PHOS: 62 U/L / ALT: 7 U/L / AST: 13 U/L / GGT: x             MICROBIOLOGY:  RECENT CULTURES:  11-06 @ 07:05 .Urine Catheterized     >100,000 CFU/ml Pseudomonas aeruginosa  Susceptibility to follow.      11-06 @ 06:14 .Blood Blood-Peripheral     No growth to date.          RADIOLOGY REVIEWED:    < from: Xray Chest 1 View- PORTABLE-Urgent (11.06.18 @ 03:37) >  IMPRESSION: Patchy opacities in the right upper lung field, may represent   infection. Chronic lung changes cannot be ruled out entirely.    < end of copied text >

## 2018-11-08 NOTE — CONSULT NOTE ADULT - SUBJECTIVE AND OBJECTIVE BOX
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 Derm is consulted for an itchy, erythematous rash on the arms, legs, chest. Family report onset a few weeks ago. Intensely itchy. SNF applied unspecified moisturizer but family feels rash has gradually worsened. No blistering, tenderness, or drainage. No h/o prior skin disease. No one else in the family is itchy. Unclear whether any other soaps or cleansers were used at the SNF. Since arrival, pt started on cefepime for possible PNA. UCx +Pseudomonas.       PAST MEDICAL & SURGICAL HISTORY:  CVA (cerebral vascular accident)  Fatty pancreas  PNA (pneumonia)  Pulmonary HTN  IGT (impaired glucose tolerance)  Ulcerative colitis  Acid reflux  Anxiety  Depression  Mouth sores  HLD (hyperlipidemia)  Asthma  Humeral head fracture  H/O: hysterectomy  H/O cataract extraction, left  History of knee replacement      REVIEW OF SYSTEMS  unable to elicit given advanced dementia    MEDICATIONS  (STANDING):  ALBUTerol    90 MICROgram(s) HFA Inhaler 2 Puff(s) Inhalation every 6 hours  buDESOnide 160 MICROgram(s)/formoterol 4.5 MICROgram(s) Inhaler 2 Puff(s) Inhalation two times a day  cefepime   IVPB 1000 milliGRAM(s) IV Intermittent every 12 hours  clonazePAM Tablet 1.5 milliGRAM(s) Oral at bedtime  clopidogrel Tablet 75 milliGRAM(s) Oral daily  dextrose 5%. 1000 milliLiter(s) (50 mL/Hr) IV Continuous <Continuous>  dextrose 50% Injectable 12.5 Gram(s) IV Push once  dextrose 50% Injectable 25 Gram(s) IV Push once  dextrose 50% Injectable 25 Gram(s) IV Push once  ergocalciferol Drops 8000 Unit(s) Oral daily  fentaNYL   Patch  12 MICROgram(s)/Hr 1 Patch Transdermal every 72 hours  ferrous    sulfate Liquid 300 milliGRAM(s) Enteral Tube daily  gabapentin 300 milliGRAM(s) Oral every 8 hours  insulin lispro (HumaLOG) corrective regimen sliding scale   SubCutaneous every 6 hours  insulin NPH human recombinant 6 Unit(s) SubCutaneous before breakfast  lidocaine   Patch 1 Patch Transdermal daily  medical marijuana oil 1 milliLiter(s) 1 milliLiter(s) SubLingual <User Schedule>  minocycline 100 milliGRAM(s) Oral two times a day  mirtazapine 7.5 milliGRAM(s) Oral at bedtime  mupirocin 2% Ointment 1 Application(s) Topical two times a day  pantoprazole   Suspension 40 milliGRAM(s) Oral before breakfast  predniSONE   Tablet 7.5 milliGRAM(s) Oral daily  QUEtiapine 25 milliGRAM(s) Oral at bedtime  sertraline 50 milliGRAM(s) Oral daily  sodium chloride 0.9%. 1000 milliLiter(s) (75 mL/Hr) IV Continuous <Continuous>  tiotropium 18 MICROgram(s) Capsule 1 Capsule(s) Inhalation daily  tiZANidine 4 milliGRAM(s) Oral <User Schedule>    MEDICATIONS  (PRN):  acetaminophen    Suspension .. 650 milliGRAM(s) Oral every 8 hours PRN Mild Pain (1 - 3)  dextrose 40% Gel 15 Gram(s) Oral once PRN Blood Glucose LESS THAN 70 milliGRAM(s)/deciliter  glucagon  Injectable 1 milliGRAM(s) IntraMuscular once PRN Glucose LESS THAN 70 milligrams/deciliter  HYDROmorphone   Tablet 2 milliGRAM(s) Enteral Tube every 4 hours PRN Moderate Pain (4 - 6)  LORazepam     Tablet 0.5 milliGRAM(s) Oral every 12 hours PRN Agitation  nystatin Powder 1 Application(s) Topical two times a day PRN rash/itchiness      Allergies    ASA; dye contrast (Anaphylaxis)  aspirin (Short breath)  divalproex sodium (Other (Unknown))  Haldol (Other (Unknown))  penicillin (Short breath; Rash)  sulfa drugs (Short breath; Rash)  Xanax (Other (Unknown))    Intolerances        SOCIAL HISTORY:    FAMILY HISTORY:  Family history of dementia (Grandparent)  Family history of colon cancer (Grandparent)      Vital Signs Last 24 Hrs  T(C): 37.1 (08 Nov 2018 11:29), Max: 37.1 (08 Nov 2018 11:29)  T(F): 98.7 (08 Nov 2018 11:29), Max: 98.7 (08 Nov 2018 11:29)  HR: 92 (08 Nov 2018 11:29) (92 - 94)  BP: 112/63 (08 Nov 2018 11:29) (108/66 - 115/66)  BP(mean): --  RR: 18 (08 Nov 2018 11:29) (18 - 18)  SpO2: 96% (08 Nov 2018 11:29) (94% - 96%)    PHYSICAL EXAM:     The patient was alert and oriented X 0, in no  apparent distress.  OP showed no ulcerations  There was no visible lymphadenopathy.  Conjunctiva were non injected  There was no clubbing or edema of extremities.  The scalp, hair, face, eyebrows, lips, OP, neck, chest, back,   extremities X 4, nails were examined.  There was no hyperhidrosis or bromhidrosis.    Of note on skin exam:   - upper chest, abdomen, b/l UE, b/l LE: ill-defined erythematous papules/plaques with overlying excoriations  - unable to examine back or abdomen under binder due to poor mobility and pain      LABS:                        9.8    8.08  )-----------( 341      ( 08 Nov 2018 08:13 )             28.3     11-08    127<L>  |  95<L>  |  14  ----------------------------<  189<H>  3.4<L>   |  22  |  0.41<L>    Ca    8.4      08 Nov 2018 06:20    TPro  5.1<L>  /  Alb  2.8<L>  /  TBili  0.2  /  DBili  x   /  AST  13  /  ALT  7<L>  /  AlkPhos  62  11-07          RADIOLOGY & ADDITIONAL STUDIES: HPI:  67 y/o F h/o advanced Alzheimer's dementia (s/p PEG and chronic foely), bed bound with sacral decubitus ulcers, UC, and 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 Derm is consulted for an itchy, erythematous rash on the arms, legs, chest. Family report onset a few weeks ago. Intensely itchy. SNF applied unspecified moisturizer but family feels rash has gradually worsened. No blistering, tenderness, or drainage. No h/o prior skin disease. No one else in the family is itchy. Unclear whether any other soaps or cleansers were used at the SNF. Since arrival, pt started on cefepime for possible PNA.       PAST MEDICAL & SURGICAL HISTORY:  CVA (cerebral vascular accident)  Fatty pancreas  PNA (pneumonia)  Pulmonary HTN  IGT (impaired glucose tolerance)  Ulcerative colitis  Acid reflux  Anxiety  Depression  Mouth sores  HLD (hyperlipidemia)  Asthma  Humeral head fracture  H/O: hysterectomy  H/O cataract extraction, left  History of knee replacement      REVIEW OF SYSTEMS  unable to elicit given advanced dementia    MEDICATIONS  (STANDING):  ALBUTerol    90 MICROgram(s) HFA Inhaler 2 Puff(s) Inhalation every 6 hours  buDESOnide 160 MICROgram(s)/formoterol 4.5 MICROgram(s) Inhaler 2 Puff(s) Inhalation two times a day  cefepime   IVPB 1000 milliGRAM(s) IV Intermittent every 12 hours  clonazePAM Tablet 1.5 milliGRAM(s) Oral at bedtime  clopidogrel Tablet 75 milliGRAM(s) Oral daily  dextrose 5%. 1000 milliLiter(s) (50 mL/Hr) IV Continuous <Continuous>  dextrose 50% Injectable 12.5 Gram(s) IV Push once  dextrose 50% Injectable 25 Gram(s) IV Push once  dextrose 50% Injectable 25 Gram(s) IV Push once  ergocalciferol Drops 8000 Unit(s) Oral daily  fentaNYL   Patch  12 MICROgram(s)/Hr 1 Patch Transdermal every 72 hours  ferrous    sulfate Liquid 300 milliGRAM(s) Enteral Tube daily  gabapentin 300 milliGRAM(s) Oral every 8 hours  insulin lispro (HumaLOG) corrective regimen sliding scale   SubCutaneous every 6 hours  insulin NPH human recombinant 6 Unit(s) SubCutaneous before breakfast  lidocaine   Patch 1 Patch Transdermal daily  medical marijuana oil 1 milliLiter(s) 1 milliLiter(s) SubLingual <User Schedule>  minocycline 100 milliGRAM(s) Oral two times a day  mirtazapine 7.5 milliGRAM(s) Oral at bedtime  mupirocin 2% Ointment 1 Application(s) Topical two times a day  pantoprazole   Suspension 40 milliGRAM(s) Oral before breakfast  predniSONE   Tablet 7.5 milliGRAM(s) Oral daily  QUEtiapine 25 milliGRAM(s) Oral at bedtime  sertraline 50 milliGRAM(s) Oral daily  sodium chloride 0.9%. 1000 milliLiter(s) (75 mL/Hr) IV Continuous <Continuous>  tiotropium 18 MICROgram(s) Capsule 1 Capsule(s) Inhalation daily  tiZANidine 4 milliGRAM(s) Oral <User Schedule>    MEDICATIONS  (PRN):  acetaminophen    Suspension .. 650 milliGRAM(s) Oral every 8 hours PRN Mild Pain (1 - 3)  dextrose 40% Gel 15 Gram(s) Oral once PRN Blood Glucose LESS THAN 70 milliGRAM(s)/deciliter  glucagon  Injectable 1 milliGRAM(s) IntraMuscular once PRN Glucose LESS THAN 70 milligrams/deciliter  HYDROmorphone   Tablet 2 milliGRAM(s) Enteral Tube every 4 hours PRN Moderate Pain (4 - 6)  LORazepam     Tablet 0.5 milliGRAM(s) Oral every 12 hours PRN Agitation  nystatin Powder 1 Application(s) Topical two times a day PRN rash/itchiness      Allergies    ASA; dye contrast (Anaphylaxis)  aspirin (Short breath)  divalproex sodium (Other (Unknown))  Haldol (Other (Unknown))  penicillin (Short breath; Rash)  sulfa drugs (Short breath; Rash)  Xanax (Other (Unknown))    Intolerances        SOCIAL HISTORY:    FAMILY HISTORY:  Family history of dementia (Grandparent)  Family history of colon cancer (Grandparent)      Vital Signs Last 24 Hrs  T(C): 37.1 (08 Nov 2018 11:29), Max: 37.1 (08 Nov 2018 11:29)  T(F): 98.7 (08 Nov 2018 11:29), Max: 98.7 (08 Nov 2018 11:29)  HR: 92 (08 Nov 2018 11:29) (92 - 94)  BP: 112/63 (08 Nov 2018 11:29) (108/66 - 115/66)  BP(mean): --  RR: 18 (08 Nov 2018 11:29) (18 - 18)  SpO2: 96% (08 Nov 2018 11:29) (94% - 96%)    PHYSICAL EXAM:     The patient was alert and oriented X 0, in no  apparent distress.  OP showed no ulcerations  There was no visible lymphadenopathy.  Conjunctiva were non injected  There was no clubbing or edema of extremities.  The scalp, hair, face, eyebrows, lips, OP, neck, chest, back,   extremities X 4, nails were examined.  There was no hyperhidrosis or bromhidrosis.    Of note on skin exam:   - upper chest, abdomen, b/l UE, b/l LE: ill-defined erythematous papules/plaques with overlying excoriations  - unable to examine back or abdomen under binder due to poor mobility and pain      LABS:                        9.8    8.08  )-----------( 341      ( 08 Nov 2018 08:13 )             28.3     11-08    127<L>  |  95<L>  |  14  ----------------------------<  189<H>  3.4<L>   |  22  |  0.41<L>    Ca    8.4      08 Nov 2018 06:20    TPro  5.1<L>  /  Alb  2.8<L>  /  TBili  0.2  /  DBili  x   /  AST  13  /  ALT  7<L>  /  AlkPhos  62  11-07          RADIOLOGY & ADDITIONAL STUDIES:

## 2018-11-08 NOTE — DIETITIAN INITIAL EVALUATION ADULT. - NS AS NUTRI INTERV ENTERAL NUTRITION
Vital 1.2cal @30mL/hr x24 hrs, increased 5mL every 4 hrs until goal rate of 45mL/hr x24 hrs is achieved. Provides: 1296kcal (27kcal/kg), 81g PRO (1.7g/kg) and 1080mL per day. Recommend to change enteral formula for better glycemic control, increased protein needs and hx of UC. Recommend Vital 1.2AF @30mL/hr x24 hrs, increased 10mL every 4 hrs until goal rate of 45mL/hr x24 hrs is achieved. Provides: 1296kcal (27kcal/kg), 81g PRO (1.7g/kg) and 1080mL per day based on current weight of 47.8kg.

## 2018-11-08 NOTE — DIETITIAN INITIAL EVALUATION ADULT. - SIGNS/SYMPTOMS
sacral stage III, L/R heel stage I, left malleolus stage I, and right foot stage I pressure injuries

## 2018-11-08 NOTE — PHYSICAL THERAPY INITIAL EVALUATION ADULT - BED MOBILITY LIMITATIONS, REHAB EVAL
TBA impaired ability to control trunk for mobility/decreased ability to use arms for pushing/pulling/decreased ability to use legs for bridging/pushing

## 2018-11-08 NOTE — DIETITIAN INITIAL EVALUATION ADULT. - OTHER INFO
Pt seen for consult for pressure ulcer >stage 2. Pt admitted for metabolic encephalopathy 2/2 complicated cystitis, and 5 pressure injuries: sacral stage III, L/R heel stage I, left malleolus stage I, right foot stage I. Pt had PEG tube in place PTA and is currently receiving Jevity 1.2 @55mL/hr with a goal rate of 65mL/hr x24 hours. Pt seen for consult for pressure ulcer >stage 2. Per chart, pt admitted for metabolic encephalopathy 2/2 complicated cystitis, and 5 pressure injuries: sacral stage III (1cm x 4cm x 0.2cm), L/R heel stage I, left malleolus stage I, right foot stage I. Pt had PEG tube in place PTA and is currently receiving Jevity 1.2 @60mL/hr with a goal rate of 65mL/hr x24 hours and is tolerating the feed. Pt seen for consult for pressure ulcer >stage 2. Per chart, pt admitted for metabolic encephalopathy 2/2 complicated cystitis, and 5 pressure injuries: sacral stage III (1cm x 4cm x 0.2cm), L/R heel stage I, left malleolus stage I, right foot stage I. Pt had PEG tube in place PTA and is currently receiving Jevity 1.2 @60mL/hr with a goal rate of 65mL/hr x24 hours and is tolerating the feed with no GI distress. BM on 11/8.

## 2018-11-08 NOTE — PHYSICAL THERAPY INITIAL EVALUATION ADULT - PASSIVE RANGE OF MOTION EXAMINATION, REHAB EVAL
B LE PROM limited by pain; R knee lacking ~15 deg. extension, B ankles lacking at least 30 deg. DF/deficits as listed below/bilateral upper extremity Passive ROM was WFL (within functional limits)

## 2018-11-08 NOTE — DIETITIAN INITIAL EVALUATION ADULT. - ENERGY NEEDS
Ht: 4' 9" Wt: 105.4 pounds BMI: 22.8 kg/m2 IBW: 94 pounds(+/-10%) 117 %IBW +2 left/right leg edema. Ht: 4' 9" Wt: 105.3 pounds BMI: 22.8 kg/m2 IBW: 94 pounds(+/-10%) 117 %IBW +2 left/right leg edema.

## 2018-11-08 NOTE — CONSULT NOTE ADULT - ASSESSMENT
+++ Consult has not yet been staffed with attending +++    # Dermatitis # Eczematous dermatitis  Ddx contact dermatitis vs. urticarial phase of bullous pemphigoid vs. less likely drug-associated.   - start topical clobetasol 0.05% ointment BID to all affected areas (but AVOID face, underarms, groin)  - will obtain family consent to perform punch biopsy to r/o urticarial BP  - recommend liberal moisturization with Vaseline or Aquaphor to reduce itch  - agree with benadryl PRN    Derm will continue to follow. Please call [phone] with questions.

## 2018-11-08 NOTE — PROGRESS NOTE ADULT - ASSESSMENT
69 yo female with many medical co-morbidities such as UC,dementia, and acid reflux.  Her rt hip down to knee still with a significant amount of hardware yet no gross evidence of relapsed MRSA infection.  It would appear the MCN is achieving its goal of suppressing infection.  Sacral decubitus does not look infected.  She may have had a recent aspiration.  Her urine is likely always with wbc and organisms, I am not to concerned about this finding.  She does not look toxic or septic, rt leg pain likely multifactorial.  Her urine may be a colonizer.  Rash predated admission, cannot blame antibiotics.  Suggest:  1.Continue  per peg BID for mcn suppression  2.Cefepime for possible pneumonia, blood cultures negative so far  3.ortho f/u, consider wound care input  4.Supportive care, follow fever, ? pulmonary in origin  5.Additional w/u pending course, she actually looks more interactive than last admission  6.If fevers persist would favor imaging of RT hip and thigh looking for occult abscess  7.Will check PC in AM, perhaps a 5 day course will be adequate

## 2018-11-08 NOTE — DIETITIAN INITIAL EVALUATION ADULT. - ADHERENCE
PEG tube, rate and formula given at rehab PTA is unknown Per pt's , pt was receiving Glucerna @35mL/hr x24hrs for one month at the rehab facility PTA

## 2018-11-08 NOTE — DIETITIAN INITIAL EVALUATION ADULT. - PROBLEM SELECTOR PLAN 4
- c/w home pain meds  - seen by ortho, NTD  - sacral ulcers and heel ulcers, consult placed to wound care  - specialty bed ordered

## 2018-11-09 ENCOUNTER — RESULT REVIEW (OUTPATIENT)
Age: 68
End: 2018-11-09

## 2018-11-09 LAB
-  AMIKACIN: SIGNIFICANT CHANGE UP
-  AZTREONAM: SIGNIFICANT CHANGE UP
-  CEFEPIME: SIGNIFICANT CHANGE UP
-  CEFTAZIDIME: SIGNIFICANT CHANGE UP
-  CIPROFLOXACIN: SIGNIFICANT CHANGE UP
-  GENTAMICIN: SIGNIFICANT CHANGE UP
-  IMIPENEM: SIGNIFICANT CHANGE UP
-  LEVOFLOXACIN: SIGNIFICANT CHANGE UP
-  MEROPENEM: SIGNIFICANT CHANGE UP
-  PIPERACILLIN/TAZOBACTAM: SIGNIFICANT CHANGE UP
-  TOBRAMYCIN: SIGNIFICANT CHANGE UP
ANION GAP SERPL CALC-SCNC: 12 MMOL/L — SIGNIFICANT CHANGE UP (ref 5–17)
BUN SERPL-MCNC: 16 MG/DL — SIGNIFICANT CHANGE UP (ref 7–23)
CALCIUM SERPL-MCNC: 8.6 MG/DL — SIGNIFICANT CHANGE UP (ref 8.4–10.5)
CHLORIDE SERPL-SCNC: 99 MMOL/L — SIGNIFICANT CHANGE UP (ref 96–108)
CO2 SERPL-SCNC: 23 MMOL/L — SIGNIFICANT CHANGE UP (ref 22–31)
CREAT SERPL-MCNC: 0.42 MG/DL — LOW (ref 0.5–1.3)
CULTURE RESULTS: SIGNIFICANT CHANGE UP
GLUCOSE BLDC GLUCOMTR-MCNC: 142 MG/DL — HIGH (ref 70–99)
GLUCOSE BLDC GLUCOMTR-MCNC: 148 MG/DL — HIGH (ref 70–99)
GLUCOSE BLDC GLUCOMTR-MCNC: 154 MG/DL — HIGH (ref 70–99)
GLUCOSE BLDC GLUCOMTR-MCNC: 154 MG/DL — HIGH (ref 70–99)
GLUCOSE BLDC GLUCOMTR-MCNC: 173 MG/DL — HIGH (ref 70–99)
GLUCOSE SERPL-MCNC: 143 MG/DL — HIGH (ref 70–99)
HCT VFR BLD CALC: 25.3 % — LOW (ref 34.5–45)
HCT VFR BLD CALC: 30.8 % — LOW (ref 34.5–45)
HGB BLD-MCNC: 10.5 G/DL — LOW (ref 11.5–15.5)
HGB BLD-MCNC: 8.5 G/DL — LOW (ref 11.5–15.5)
MCHC RBC-ENTMCNC: 29.9 PG — SIGNIFICANT CHANGE UP (ref 27–34)
MCHC RBC-ENTMCNC: 30.7 PG — SIGNIFICANT CHANGE UP (ref 27–34)
MCHC RBC-ENTMCNC: 33.6 GM/DL — SIGNIFICANT CHANGE UP (ref 32–36)
MCHC RBC-ENTMCNC: 33.9 GM/DL — SIGNIFICANT CHANGE UP (ref 32–36)
MCV RBC AUTO: 89.1 FL — SIGNIFICANT CHANGE UP (ref 80–100)
MCV RBC AUTO: 90.4 FL — SIGNIFICANT CHANGE UP (ref 80–100)
METHOD TYPE: SIGNIFICANT CHANGE UP
ORGANISM # SPEC MICROSCOPIC CNT: SIGNIFICANT CHANGE UP
ORGANISM # SPEC MICROSCOPIC CNT: SIGNIFICANT CHANGE UP
PLATELET # BLD AUTO: 331 K/UL — SIGNIFICANT CHANGE UP (ref 150–400)
PLATELET # BLD AUTO: 383 K/UL — SIGNIFICANT CHANGE UP (ref 150–400)
POTASSIUM SERPL-MCNC: 3.4 MMOL/L — LOW (ref 3.5–5.3)
POTASSIUM SERPL-SCNC: 3.4 MMOL/L — LOW (ref 3.5–5.3)
PROCALCITONIN SERPL-MCNC: 0.46 NG/ML — HIGH (ref 0.02–0.1)
RBC # BLD: 2.84 M/UL — LOW (ref 3.8–5.2)
RBC # BLD: 3.41 M/UL — LOW (ref 3.8–5.2)
RBC # FLD: 13.7 % — SIGNIFICANT CHANGE UP (ref 10.3–14.5)
RBC # FLD: 14.6 % — HIGH (ref 10.3–14.5)
SODIUM SERPL-SCNC: 134 MMOL/L — LOW (ref 135–145)
SPECIMEN SOURCE: SIGNIFICANT CHANGE UP
WBC # BLD: 13.9 K/UL — HIGH (ref 3.8–10.5)
WBC # BLD: 9.78 K/UL — SIGNIFICANT CHANGE UP (ref 3.8–10.5)
WBC # FLD AUTO: 13.9 K/UL — HIGH (ref 3.8–10.5)
WBC # FLD AUTO: 9.78 K/UL — SIGNIFICANT CHANGE UP (ref 3.8–10.5)

## 2018-11-09 PROCEDURE — 88305 TISSUE EXAM BY PATHOLOGIST: CPT | Mod: 26

## 2018-11-09 PROCEDURE — 88346 IMFLUOR 1ST 1ANTB STAIN PX: CPT | Mod: 26

## 2018-11-09 PROCEDURE — 88350 IMFLUOR EA ADDL 1ANTB STN PX: CPT | Mod: 26

## 2018-11-09 RX ORDER — POTASSIUM CHLORIDE 20 MEQ
20 PACKET (EA) ORAL ONCE
Qty: 0 | Refills: 0 | Status: COMPLETED | OUTPATIENT
Start: 2018-11-09 | End: 2018-11-09

## 2018-11-09 RX ADMIN — HYDROMORPHONE HYDROCHLORIDE 2 MILLIGRAM(S): 2 INJECTION INTRAMUSCULAR; INTRAVENOUS; SUBCUTANEOUS at 09:48

## 2018-11-09 RX ADMIN — Medication 20 MILLIEQUIVALENT(S): at 14:42

## 2018-11-09 RX ADMIN — MUPIROCIN 1 APPLICATION(S): 20 OINTMENT TOPICAL at 17:29

## 2018-11-09 RX ADMIN — Medication 300 MILLIGRAM(S): at 12:36

## 2018-11-09 RX ADMIN — CEFEPIME 100 MILLIGRAM(S): 1 INJECTION, POWDER, FOR SOLUTION INTRAMUSCULAR; INTRAVENOUS at 05:53

## 2018-11-09 RX ADMIN — SERTRALINE 50 MILLIGRAM(S): 25 TABLET, FILM COATED ORAL at 12:36

## 2018-11-09 RX ADMIN — MINOCYCLINE HYDROCHLORIDE 100 MILLIGRAM(S): 45 TABLET, EXTENDED RELEASE ORAL at 05:54

## 2018-11-09 RX ADMIN — FENTANYL CITRATE 1 PATCH: 50 INJECTION INTRAVENOUS at 17:24

## 2018-11-09 RX ADMIN — HYDROMORPHONE HYDROCHLORIDE 2 MILLIGRAM(S): 2 INJECTION INTRAMUSCULAR; INTRAVENOUS; SUBCUTANEOUS at 14:07

## 2018-11-09 RX ADMIN — ALBUTEROL 2 PUFF(S): 90 AEROSOL, METERED ORAL at 17:27

## 2018-11-09 RX ADMIN — Medication 1.5 MILLIGRAM(S): at 22:00

## 2018-11-09 RX ADMIN — HUMAN INSULIN 6 UNIT(S): 100 INJECTION, SUSPENSION SUBCUTANEOUS at 08:08

## 2018-11-09 RX ADMIN — HYDROMORPHONE HYDROCHLORIDE 2 MILLIGRAM(S): 2 INJECTION INTRAMUSCULAR; INTRAVENOUS; SUBCUTANEOUS at 10:15

## 2018-11-09 RX ADMIN — BUDESONIDE AND FORMOTEROL FUMARATE DIHYDRATE 2 PUFF(S): 160; 4.5 AEROSOL RESPIRATORY (INHALATION) at 17:28

## 2018-11-09 RX ADMIN — ALBUTEROL 2 PUFF(S): 90 AEROSOL, METERED ORAL at 12:36

## 2018-11-09 RX ADMIN — LIDOCAINE 1 PATCH: 4 CREAM TOPICAL at 19:15

## 2018-11-09 RX ADMIN — SODIUM CHLORIDE 75 MILLILITER(S): 9 INJECTION INTRAMUSCULAR; INTRAVENOUS; SUBCUTANEOUS at 18:29

## 2018-11-09 RX ADMIN — PANTOPRAZOLE SODIUM 40 MILLIGRAM(S): 20 TABLET, DELAYED RELEASE ORAL at 06:05

## 2018-11-09 RX ADMIN — ALBUTEROL 2 PUFF(S): 90 AEROSOL, METERED ORAL at 05:53

## 2018-11-09 RX ADMIN — HYDROMORPHONE HYDROCHLORIDE 2 MILLIGRAM(S): 2 INJECTION INTRAMUSCULAR; INTRAVENOUS; SUBCUTANEOUS at 14:40

## 2018-11-09 RX ADMIN — TIOTROPIUM BROMIDE 1 CAPSULE(S): 18 CAPSULE ORAL; RESPIRATORY (INHALATION) at 12:36

## 2018-11-09 RX ADMIN — MUPIROCIN 1 APPLICATION(S): 20 OINTMENT TOPICAL at 05:54

## 2018-11-09 RX ADMIN — CLOPIDOGREL BISULFATE 75 MILLIGRAM(S): 75 TABLET, FILM COATED ORAL at 12:36

## 2018-11-09 RX ADMIN — Medication 1: at 06:06

## 2018-11-09 RX ADMIN — BUDESONIDE AND FORMOTEROL FUMARATE DIHYDRATE 2 PUFF(S): 160; 4.5 AEROSOL RESPIRATORY (INHALATION) at 05:53

## 2018-11-09 RX ADMIN — CEFEPIME 100 MILLIGRAM(S): 1 INJECTION, POWDER, FOR SOLUTION INTRAMUSCULAR; INTRAVENOUS at 17:28

## 2018-11-09 RX ADMIN — TIZANIDINE 4 MILLIGRAM(S): 4 TABLET ORAL at 09:05

## 2018-11-09 RX ADMIN — FENTANYL CITRATE 1 PATCH: 50 INJECTION INTRAVENOUS at 17:36

## 2018-11-09 RX ADMIN — LIDOCAINE 1 PATCH: 4 CREAM TOPICAL at 00:10

## 2018-11-09 RX ADMIN — QUETIAPINE FUMARATE 25 MILLIGRAM(S): 200 TABLET, FILM COATED ORAL at 21:56

## 2018-11-09 RX ADMIN — GABAPENTIN 300 MILLIGRAM(S): 400 CAPSULE ORAL at 14:07

## 2018-11-09 RX ADMIN — GABAPENTIN 300 MILLIGRAM(S): 400 CAPSULE ORAL at 05:53

## 2018-11-09 RX ADMIN — MIRTAZAPINE 7.5 MILLIGRAM(S): 45 TABLET, ORALLY DISINTEGRATING ORAL at 21:56

## 2018-11-09 RX ADMIN — LIDOCAINE 1 PATCH: 4 CREAM TOPICAL at 12:36

## 2018-11-09 RX ADMIN — Medication 7.5 MILLIGRAM(S): at 05:54

## 2018-11-09 RX ADMIN — ERGOCALCIFEROL 8000 UNIT(S): 1.25 CAPSULE ORAL at 14:41

## 2018-11-09 RX ADMIN — Medication 1: at 12:37

## 2018-11-09 RX ADMIN — FENTANYL CITRATE 1 PATCH: 50 INJECTION INTRAVENOUS at 07:13

## 2018-11-09 RX ADMIN — FENTANYL CITRATE 1 PATCH: 50 INJECTION INTRAVENOUS at 19:15

## 2018-11-09 RX ADMIN — TIZANIDINE 4 MILLIGRAM(S): 4 TABLET ORAL at 21:55

## 2018-11-09 RX ADMIN — GABAPENTIN 300 MILLIGRAM(S): 400 CAPSULE ORAL at 21:56

## 2018-11-09 RX ADMIN — MINOCYCLINE HYDROCHLORIDE 100 MILLIGRAM(S): 45 TABLET, EXTENDED RELEASE ORAL at 17:29

## 2018-11-09 NOTE — PROGRESS NOTE ADULT - SUBJECTIVE AND OBJECTIVE BOX
CC: f/u for pneumonia     Patient reports feeling better, occas cough, mostly nonproductive, poor clearance    REVIEW OF SYSTEMS:  All other review of systems negative (Comprehensive ROS)    Antimicrobials Day # 4   cefepime   IVPB 1000 milliGRAM(s) IV Intermittent every 12 hours  minocycline 100 milliGRAM(s) Oral two times a day    Other Medications Reviewed    T(F): 98.7 (11-09-18 @ 12:53), Max: 99.8 (11-08-18 @ 22:15)  HR: 97 (11-09-18 @ 12:53)  BP: 118/57 (11-09-18 @ 12:53)  RR: 18 (11-09-18 @ 12:53)  SpO2: 90% (11-09-18 @ 12:53)  Wt(kg): --    PHYSICAL EXAM:  General: alert, no acute distress  Eyes:  anicteric, no conjunctival injection, no discharge  Oropharynx: no lesions or injection 	  Neck: supple, without adenopathy  Lungs: clear to auscultation anteriorly   Heart: regular rate and rhythm; no murmur, rubs or gallops  Abdomen: soft, nondistended, nontender, G tube clean  Skin: faint papular rash chest  Extremities: R hip/thigh incision intact, except most distal aspect, very shallow ulceration  Neurologic: alert, moves all extremities    LAB RESULTS:                        8.5    9.78  )-----------( 331      ( 09 Nov 2018 10:12 )             25.3     11-09    134<L>  |  99  |  16  ----------------------------<  143<H>  3.4<L>   |  23  |  0.42<L>    Ca    8.6      09 Nov 2018 08:50    Procalcitonin, Serum (11.09.18 @ 08:50)    Procalcitonin, Serum: 0.46    MICROBIOLOGY:  RECENT CULTURES:  11-06 @ 07:05 .Urine Catheterized Pseudomonas aeruginosa (Carbapenem Resistant)    >100,000 CFU/ml Pseudomonas aeruginosa (Carbapenem Resistant)    11-06 @ 06:14 .Blood Blood-Peripheral     No growth to date.    RADIOLOGY REVIEWED:  Xray Chest 1 View- PORTABLE-Urgent (11.06.18 @ 03:37) >  Patchy opacities in the right upper lung field, may represent   infection. Chronic lung changes cannot be ruled out entirely.

## 2018-11-09 NOTE — PROGRESS NOTE ADULT - ASSESSMENT
67 yo female with UC, dementia, prior prolonged hosp stay  On MCN suppression for prior R hip MRSA infection- spacer; wound clean  Rash predated admission, Derm input noted, s/p punch Bx earlier  On Cefepime for possible recent aspiration pneumonia  MDR Pseudomonas in urine likely colonizer only  Blood cultures negative   Afebrile, alert, WBC normal, PCT <0.5    Suggest:  Continue  per peg BID for MRSA suppression  Cefepime for possible pneumonia  Will trend PCT level  Follow temps and CBC/diff   D/w daughter at bedside

## 2018-11-09 NOTE — PROCEDURE NOTE - ADDITIONAL PROCEDURE DETAILS
Patient with very difficulty positioning secondary to advanced dementia and multiple pathologic fractures. Adequate visualization was unable to obtain but anatomy was grossly normal by palpation. A 14F coude tip catheter was eventually guided over a vaginally inserted finger into the urethral meatus. Correct placement was confirmed by irrigation with sterile water. Balloon was inflated with 10cc sterile water and the catheter was secured.     Please do not remove catheter, care per primary team.

## 2018-11-09 NOTE — PROGRESS NOTE ADULT - SUBJECTIVE AND OBJECTIVE BOX
---___---___---___---___---___---___ ---___---___---___---___---___---___---___---___---___---                    <<<  M E D I C A L   A T T E N D I N G    F O L L O W    U P   N O T E  >>>    - Patient seen and examined by me approximately thirty minutes ago.  - In summary, patient is a 68y year old Female who origianlly presented with metabolic encephalopathy   - Patient today overall doing ok, comfortable, eating OK.     ---___---___---___---___---___---      <<<  MEDICATIONS:  >>>    MEDICATIONS  (STANDING):  ALBUTerol    90 MICROgram(s) HFA Inhaler 2 Puff(s) Inhalation every 6 hours  buDESOnide 160 MICROgram(s)/formoterol 4.5 MICROgram(s) Inhaler 2 Puff(s) Inhalation two times a day  cefepime   IVPB 1000 milliGRAM(s) IV Intermittent every 12 hours  clonazePAM Tablet 1.5 milliGRAM(s) Oral at bedtime  clopidogrel Tablet 75 milliGRAM(s) Oral daily  dextrose 5%. 1000 milliLiter(s) (50 mL/Hr) IV Continuous <Continuous>  dextrose 50% Injectable 12.5 Gram(s) IV Push once  dextrose 50% Injectable 25 Gram(s) IV Push once  dextrose 50% Injectable 25 Gram(s) IV Push once  ergocalciferol Drops 8000 Unit(s) Oral daily  fentaNYL   Patch  12 MICROgram(s)/Hr 1 Patch Transdermal every 72 hours  ferrous    sulfate Liquid 300 milliGRAM(s) Enteral Tube daily  gabapentin 300 milliGRAM(s) Oral every 8 hours  insulin lispro (HumaLOG) corrective regimen sliding scale   SubCutaneous every 6 hours  insulin NPH human recombinant 6 Unit(s) SubCutaneous before breakfast  lidocaine   Patch 1 Patch Transdermal daily  medical marijuana oil 1 milliLiter(s) 1 milliLiter(s) SubLingual <User Schedule>  minocycline 100 milliGRAM(s) Oral two times a day  mirtazapine 7.5 milliGRAM(s) Oral at bedtime  mupirocin 2% Ointment 1 Application(s) Topical two times a day  pantoprazole   Suspension 40 milliGRAM(s) Oral before breakfast  predniSONE   Tablet 7.5 milliGRAM(s) Oral daily  QUEtiapine 25 milliGRAM(s) Oral at bedtime  sertraline 50 milliGRAM(s) Oral daily  sodium chloride 0.9%. 1000 milliLiter(s) (75 mL/Hr) IV Continuous <Continuous>  tiotropium 18 MICROgram(s) Capsule 1 Capsule(s) Inhalation daily  tiZANidine 4 milliGRAM(s) Oral <User Schedule>      MEDICATIONS  (PRN):  acetaminophen    Suspension .. 650 milliGRAM(s) Oral every 8 hours PRN Mild Pain (1 - 3)  dextrose 40% Gel 15 Gram(s) Oral once PRN Blood Glucose LESS THAN 70 milliGRAM(s)/deciliter  glucagon  Injectable 1 milliGRAM(s) IntraMuscular once PRN Glucose LESS THAN 70 milligrams/deciliter  HYDROmorphone   Tablet 2 milliGRAM(s) Enteral Tube every 4 hours PRN Moderate Pain (4 - 6)  LORazepam     Tablet 0.5 milliGRAM(s) Oral every 12 hours PRN Agitation  nystatin Powder 1 Application(s) Topical two times a day PRN rash/itchiness       ---___---___---___---___---___---     <<<REVIEW OF SYSTEM: >>>    unable to obtain    ---___---___---___---___---___---          <<<  VITAL SIGNS: >>>    T(F): 98.2 (11-09-18 @ 05:09), Max: 99.8 (11-08-18 @ 22:15)  HR: 93 (11-09-18 @ 05:09) (92 - 98)  BP: 119/65 (11-09-18 @ 05:09) (112/63 - 150/78)  RR: 18 (11-09-18 @ 05:09) (17 - 18)  SpO2: 95% (11-09-18 @ 05:09) (95% - 96%)  Wt(kg): --  CAPILLARY BLOOD GLUCOSE      POCT Blood Glucose.: 154 mg/dL (09 Nov 2018 06:03)    I&O's Summary    07 Nov 2018 07:01  -  08 Nov 2018 07:00  --------------------------------------------------------  IN: 0 mL / OUT: 950 mL / NET: -950 mL    08 Nov 2018 07:01  -  09 Nov 2018 06:54  --------------------------------------------------------  IN: 2410 mL / OUT: 1200 mL / NET: 1210 mL         ---___---___---___---___---___---                       PHYSICAL EXAM:    GEN: A&O X 2 , NAD , comfortable  HEENT: NCAT, PERRL, MMM, no scleral icterus, hearing intact  NECK: Supple, No JVD  CVS: S1S2 , regular , No M/R/G appreciated  PULM: CTA B/L,  no W/R/R appreciated  ABD.: soft. non tender, non distended,  bowel sounds present (+) peg   Extrem: intact pulses , no edema noted  Derm:  rash noted on the arms   PSYCH: normal mood,  depression,  anxious     ---___---___---___---___---___---     <<<  LAB AND IMAGING: >>>                          9.8    8.08  )-----------( 341      ( 08 Nov 2018 08:13 )             28.3               11-08    127<L>  |  95<L>  |  14  ----------------------------<  189<H>  3.4<L>   |  22  |  0.41<L>    Ca    8.4      08 Nov 2018 06:20                                 [All pertinent / recent available Imaging reports and other labs reviewed]     ---___---___---___---___---___---___ ---___---___---___---___---           <<<  A S S E S S M E N T   A N D   P L A N :  >>>          -GI/DVT Prophylaxis.    --------------------------------------------  Case discussed with   Education given on     >>______________________<<      Deniz Bolden .         phone   [phone]

## 2018-11-09 NOTE — CHART NOTE - NSCHARTNOTEFT_GEN_A_CORE
Dermatology Brief Chart Note    Pt seen at bedside. Skin lesions improving with topical clobetasol. Discussed with daughters at bedside regarding plan for punch biopsies x2 (for H&E/DIF). Consent obtained and procedure performed as below. For wound care, leave dressing on for 24hrs prior to removing. Then apply liberal vaseline daily. Continue topical clobetasol ointment BID to itchy areas.     After risks and benefits of procedure including bleeding, infection and scar were reviewed (consents including photo consent reviewed, signed and in chart), allergies were reviewed and time out performed. Written consent given by the patient's daughter and  (cell # [phone]). Area cleaned with rubbing alcohol and anesthetized with lidocaine and epinephrine.  Two 4mm punch biopsy were performed to the R shoulder. Hemostasis achieved with gel foam with pressure dressing placed. Wound care reviewed with patient and team.    Derm will continue to follow. Please call [phone] with questions.

## 2018-11-09 NOTE — PROCEDURE NOTE - NSURITECHNIQUE_GU_A_CORE
The site was cleaned with soap/water and sterile solution (betadine)/The urinary drainage system is closed at the end of the procedure/Proper hand hygiene was performed/Sterile gloves were worn for the duration of the procedure/The catheter was secured with a securement device (e.g. StatLock)/The catheter was appropriately lubricated/The collection bag is below the level of the patient and urinary bladder/A sterile drape was used to cover all adjacent areas

## 2018-11-10 LAB
ANION GAP SERPL CALC-SCNC: 12 MMOL/L — SIGNIFICANT CHANGE UP (ref 5–17)
BUN SERPL-MCNC: 18 MG/DL — SIGNIFICANT CHANGE UP (ref 7–23)
CALCIUM SERPL-MCNC: 9 MG/DL — SIGNIFICANT CHANGE UP (ref 8.4–10.5)
CHLORIDE SERPL-SCNC: 99 MMOL/L — SIGNIFICANT CHANGE UP (ref 96–108)
CO2 SERPL-SCNC: 21 MMOL/L — LOW (ref 22–31)
CREAT SERPL-MCNC: 0.37 MG/DL — LOW (ref 0.5–1.3)
GLUCOSE BLDC GLUCOMTR-MCNC: 103 MG/DL — HIGH (ref 70–99)
GLUCOSE BLDC GLUCOMTR-MCNC: 128 MG/DL — HIGH (ref 70–99)
GLUCOSE BLDC GLUCOMTR-MCNC: 143 MG/DL — HIGH (ref 70–99)
GLUCOSE BLDC GLUCOMTR-MCNC: 159 MG/DL — HIGH (ref 70–99)
GLUCOSE BLDC GLUCOMTR-MCNC: 169 MG/DL — HIGH (ref 70–99)
GLUCOSE SERPL-MCNC: 95 MG/DL — SIGNIFICANT CHANGE UP (ref 70–99)
HCT VFR BLD CALC: 30.8 % — LOW (ref 34.5–45)
HGB BLD-MCNC: 10.2 G/DL — LOW (ref 11.5–15.5)
MAGNESIUM SERPL-MCNC: 1.2 MG/DL — LOW (ref 1.6–2.6)
MCHC RBC-ENTMCNC: 29.7 PG — SIGNIFICANT CHANGE UP (ref 27–34)
MCHC RBC-ENTMCNC: 33.1 GM/DL — SIGNIFICANT CHANGE UP (ref 32–36)
MCV RBC AUTO: 89.9 FL — SIGNIFICANT CHANGE UP (ref 80–100)
PLATELET # BLD AUTO: 411 K/UL — HIGH (ref 150–400)
POTASSIUM SERPL-MCNC: 3.6 MMOL/L — SIGNIFICANT CHANGE UP (ref 3.5–5.3)
POTASSIUM SERPL-SCNC: 3.6 MMOL/L — SIGNIFICANT CHANGE UP (ref 3.5–5.3)
PROCALCITONIN SERPL-MCNC: 0.45 NG/ML — HIGH (ref 0.02–0.1)
RBC # BLD: 3.43 M/UL — LOW (ref 3.8–5.2)
RBC # FLD: 13.5 % — SIGNIFICANT CHANGE UP (ref 10.3–14.5)
SODIUM SERPL-SCNC: 132 MMOL/L — LOW (ref 135–145)
WBC # BLD: 15.4 K/UL — HIGH (ref 3.8–10.5)
WBC # FLD AUTO: 15.4 K/UL — HIGH (ref 3.8–10.5)

## 2018-11-10 RX ORDER — MAGNESIUM SULFATE 500 MG/ML
2 VIAL (ML) INJECTION ONCE
Qty: 0 | Refills: 0 | Status: COMPLETED | OUTPATIENT
Start: 2018-11-10 | End: 2018-11-10

## 2018-11-10 RX ORDER — DIPHENHYDRAMINE HCL 50 MG
25 CAPSULE ORAL ONCE
Qty: 0 | Refills: 0 | Status: COMPLETED | OUTPATIENT
Start: 2018-11-10 | End: 2018-11-10

## 2018-11-10 RX ADMIN — ERGOCALCIFEROL 8000 UNIT(S): 1.25 CAPSULE ORAL at 15:53

## 2018-11-10 RX ADMIN — ALBUTEROL 2 PUFF(S): 90 AEROSOL, METERED ORAL at 12:21

## 2018-11-10 RX ADMIN — SERTRALINE 50 MILLIGRAM(S): 25 TABLET, FILM COATED ORAL at 12:22

## 2018-11-10 RX ADMIN — MIRTAZAPINE 7.5 MILLIGRAM(S): 45 TABLET, ORALLY DISINTEGRATING ORAL at 22:23

## 2018-11-10 RX ADMIN — CEFEPIME 100 MILLIGRAM(S): 1 INJECTION, POWDER, FOR SOLUTION INTRAMUSCULAR; INTRAVENOUS at 07:23

## 2018-11-10 RX ADMIN — HYDROMORPHONE HYDROCHLORIDE 2 MILLIGRAM(S): 2 INJECTION INTRAMUSCULAR; INTRAVENOUS; SUBCUTANEOUS at 07:22

## 2018-11-10 RX ADMIN — FENTANYL CITRATE 1 PATCH: 50 INJECTION INTRAVENOUS at 19:22

## 2018-11-10 RX ADMIN — Medication 7.5 MILLIGRAM(S): at 06:27

## 2018-11-10 RX ADMIN — GABAPENTIN 300 MILLIGRAM(S): 400 CAPSULE ORAL at 22:23

## 2018-11-10 RX ADMIN — QUETIAPINE FUMARATE 25 MILLIGRAM(S): 200 TABLET, FILM COATED ORAL at 22:23

## 2018-11-10 RX ADMIN — ALBUTEROL 2 PUFF(S): 90 AEROSOL, METERED ORAL at 23:08

## 2018-11-10 RX ADMIN — Medication 1: at 12:23

## 2018-11-10 RX ADMIN — BUDESONIDE AND FORMOTEROL FUMARATE DIHYDRATE 2 PUFF(S): 160; 4.5 AEROSOL RESPIRATORY (INHALATION) at 06:25

## 2018-11-10 RX ADMIN — ALBUTEROL 2 PUFF(S): 90 AEROSOL, METERED ORAL at 18:34

## 2018-11-10 RX ADMIN — TIZANIDINE 4 MILLIGRAM(S): 4 TABLET ORAL at 20:43

## 2018-11-10 RX ADMIN — SODIUM CHLORIDE 75 MILLILITER(S): 9 INJECTION INTRAMUSCULAR; INTRAVENOUS; SUBCUTANEOUS at 12:22

## 2018-11-10 RX ADMIN — LIDOCAINE 1 PATCH: 4 CREAM TOPICAL at 00:32

## 2018-11-10 RX ADMIN — LIDOCAINE 1 PATCH: 4 CREAM TOPICAL at 19:22

## 2018-11-10 RX ADMIN — Medication 25 MILLIGRAM(S): at 23:03

## 2018-11-10 RX ADMIN — GABAPENTIN 300 MILLIGRAM(S): 400 CAPSULE ORAL at 15:53

## 2018-11-10 RX ADMIN — Medication 300 MILLIGRAM(S): at 12:22

## 2018-11-10 RX ADMIN — HUMAN INSULIN 6 UNIT(S): 100 INJECTION, SUSPENSION SUBCUTANEOUS at 07:34

## 2018-11-10 RX ADMIN — FENTANYL CITRATE 1 PATCH: 50 INJECTION INTRAVENOUS at 07:33

## 2018-11-10 RX ADMIN — BUDESONIDE AND FORMOTEROL FUMARATE DIHYDRATE 2 PUFF(S): 160; 4.5 AEROSOL RESPIRATORY (INHALATION) at 18:34

## 2018-11-10 RX ADMIN — Medication 1.5 MILLIGRAM(S): at 22:23

## 2018-11-10 RX ADMIN — CEFEPIME 100 MILLIGRAM(S): 1 INJECTION, POWDER, FOR SOLUTION INTRAMUSCULAR; INTRAVENOUS at 18:34

## 2018-11-10 RX ADMIN — TIZANIDINE 4 MILLIGRAM(S): 4 TABLET ORAL at 10:44

## 2018-11-10 RX ADMIN — LIDOCAINE 1 PATCH: 4 CREAM TOPICAL at 12:22

## 2018-11-10 RX ADMIN — MINOCYCLINE HYDROCHLORIDE 100 MILLIGRAM(S): 45 TABLET, EXTENDED RELEASE ORAL at 18:33

## 2018-11-10 RX ADMIN — TIOTROPIUM BROMIDE 1 CAPSULE(S): 18 CAPSULE ORAL; RESPIRATORY (INHALATION) at 12:23

## 2018-11-10 RX ADMIN — MINOCYCLINE HYDROCHLORIDE 100 MILLIGRAM(S): 45 TABLET, EXTENDED RELEASE ORAL at 06:26

## 2018-11-10 RX ADMIN — Medication 50 GRAM(S): at 22:23

## 2018-11-10 RX ADMIN — CLOPIDOGREL BISULFATE 75 MILLIGRAM(S): 75 TABLET, FILM COATED ORAL at 12:22

## 2018-11-10 RX ADMIN — PANTOPRAZOLE SODIUM 40 MILLIGRAM(S): 20 TABLET, DELAYED RELEASE ORAL at 06:27

## 2018-11-10 RX ADMIN — MUPIROCIN 1 APPLICATION(S): 20 OINTMENT TOPICAL at 06:27

## 2018-11-10 RX ADMIN — SODIUM CHLORIDE 75 MILLILITER(S): 9 INJECTION INTRAMUSCULAR; INTRAVENOUS; SUBCUTANEOUS at 22:24

## 2018-11-10 RX ADMIN — HYDROMORPHONE HYDROCHLORIDE 2 MILLIGRAM(S): 2 INJECTION INTRAMUSCULAR; INTRAVENOUS; SUBCUTANEOUS at 07:50

## 2018-11-10 RX ADMIN — ALBUTEROL 2 PUFF(S): 90 AEROSOL, METERED ORAL at 06:25

## 2018-11-10 RX ADMIN — MUPIROCIN 1 APPLICATION(S): 20 OINTMENT TOPICAL at 18:33

## 2018-11-10 RX ADMIN — ALBUTEROL 2 PUFF(S): 90 AEROSOL, METERED ORAL at 00:33

## 2018-11-10 RX ADMIN — GABAPENTIN 300 MILLIGRAM(S): 400 CAPSULE ORAL at 06:26

## 2018-11-10 NOTE — PROGRESS NOTE ADULT - SUBJECTIVE AND OBJECTIVE BOX
CC: f/u for pneumonia     Patient reports more confused at times, but arouses, follows commands, no distress    REVIEW OF SYSTEMS:  All other review of systems negative (Comprehensive ROS)- limited, d/w daughter at bedside    Antimicrobials Day # 5  cefepime   IVPB 1000 milliGRAM(s) IV Intermittent every 12 hours  minocycline 100 milliGRAM(s) Oral two times a day    Other Medications Reviewed    Vital Signs Last 24 Hrs  T(F): 98.2 (10 Nov 2018 13:01), Max: 98.7 (09 Nov 2018 21:10)  HR: 97 (10 Nov 2018 13:01) (91 - 106)  BP: 151/94 (10 Nov 2018 13:01) (135/74 - 154/68)  BP(mean): --  RR: 18 (10 Nov 2018 13:01) (18 - 18)  SpO2: 97% (10 Nov 2018 13:01) (95% - 97%)    PHYSICAL EXAM:  General: alert, no acute distress  Eyes:  anicteric, no conjunctival injection, no discharge  Oropharynx: no lesions or injection 	  Neck: supple, without adenopathy  Lungs: clear to auscultation anteriorly   Heart: regular rate and rhythm; no murmur, rubs or gallops  Abdomen: soft, nondistended, nontender, G tube clean  Blackman  Skin: faint papular rash chest  Extremities: R hip/thigh incision intact, except most distal aspect, very shallow ulceration- clean  Neurologic: alert, moves all extremities    LAB RESULTS:                        10.5   13.9  )-----------( 383      ( 09 Nov 2018 18:45 )             30.8   11-09    134<L>  |  99  |  16  ----------------------------<  143<H>  3.4<L>   |  23  |  0.42<L>    Ca    8.6      09 Nov 2018 08:50      Procalcitonin, Serum (11.09.18 @ 08:50)    Procalcitonin, Serum: 0.46    MICROBIOLOGY:  RECENT CULTURES:  11-06 @ 07:05 .Urine Catheterized Pseudomonas aeruginosa (Carbapenem Resistant)    >100,000 CFU/ml Pseudomonas aeruginosa (Carbapenem Resistant)    11-06 @ 06:14 .Blood Blood-Peripheral     No growth to date.    RADIOLOGY REVIEWED:  Xray Chest 1 View- PORTABLE-Urgent (11.06.18 @ 03:37) >  Patchy opacities in the right upper lung field, may represent   infection. Chronic lung changes cannot be ruled out entirely.

## 2018-11-10 NOTE — PROGRESS NOTE ADULT - SUBJECTIVE AND OBJECTIVE BOX
---___---___---___---___---___---___ ---___---___---___---___---___---___---___---___---___---                    <<<  M E D I C A L   A T T E N D I N G    F O L L O W    U P   N O T E  >>>    - Patient seen and examined by me approximately thirty minutes ago.  - In summary, patient is a 68y year old Female who origianlly presented with acute metabolic encephalopathy   - Patient today overall doing ok, comfortable, eating OK.     ---___---___---___---___---___---      <<<  MEDICATIONS:  >>>    MEDICATIONS  (STANDING):  ALBUTerol    90 MICROgram(s) HFA Inhaler 2 Puff(s) Inhalation every 6 hours  buDESOnide 160 MICROgram(s)/formoterol 4.5 MICROgram(s) Inhaler 2 Puff(s) Inhalation two times a day  cefepime   IVPB 1000 milliGRAM(s) IV Intermittent every 12 hours  clonazePAM Tablet 1.5 milliGRAM(s) Oral at bedtime  clopidogrel Tablet 75 milliGRAM(s) Oral daily  dextrose 5%. 1000 milliLiter(s) (50 mL/Hr) IV Continuous <Continuous>  dextrose 50% Injectable 12.5 Gram(s) IV Push once  dextrose 50% Injectable 25 Gram(s) IV Push once  dextrose 50% Injectable 25 Gram(s) IV Push once  ergocalciferol Drops 8000 Unit(s) Oral daily  fentaNYL   Patch  12 MICROgram(s)/Hr 1 Patch Transdermal every 72 hours  ferrous    sulfate Liquid 300 milliGRAM(s) Enteral Tube daily  gabapentin 300 milliGRAM(s) Oral every 8 hours  insulin lispro (HumaLOG) corrective regimen sliding scale   SubCutaneous every 6 hours  insulin NPH human recombinant 6 Unit(s) SubCutaneous before breakfast  lidocaine   Patch 1 Patch Transdermal daily  medical marijuana oil 1 milliLiter(s) 1 milliLiter(s) SubLingual <User Schedule>  minocycline 100 milliGRAM(s) Oral two times a day  mirtazapine 7.5 milliGRAM(s) Oral at bedtime  mupirocin 2% Ointment 1 Application(s) Topical two times a day  pantoprazole   Suspension 40 milliGRAM(s) Oral before breakfast  predniSONE   Tablet 7.5 milliGRAM(s) Oral daily  QUEtiapine 25 milliGRAM(s) Oral at bedtime  sertraline 50 milliGRAM(s) Oral daily  sodium chloride 0.9%. 1000 milliLiter(s) (75 mL/Hr) IV Continuous <Continuous>  tiotropium 18 MICROgram(s) Capsule 1 Capsule(s) Inhalation daily  tiZANidine 4 milliGRAM(s) Oral <User Schedule>      MEDICATIONS  (PRN):  acetaminophen    Suspension .. 650 milliGRAM(s) Oral every 8 hours PRN Mild Pain (1 - 3)  dextrose 40% Gel 15 Gram(s) Oral once PRN Blood Glucose LESS THAN 70 milliGRAM(s)/deciliter  glucagon  Injectable 1 milliGRAM(s) IntraMuscular once PRN Glucose LESS THAN 70 milligrams/deciliter  HYDROmorphone   Tablet 2 milliGRAM(s) Enteral Tube every 4 hours PRN Moderate Pain (4 - 6)  LORazepam     Tablet 0.5 milliGRAM(s) Oral every 12 hours PRN Agitation  nystatin Powder 1 Application(s) Topical two times a day PRN rash/itchiness       ---___---___---___---___---___---     <<<REVIEW OF SYSTEM: >>>    GEN: no fever, no chills, no pain  RESP: no SOB, no cough, no sputum  CVS: no chest pain, no palpitations, no edema  GI: no abdominal pain, no nausea, no vomiting, no constipation, no diarrhea  : no dysurea, no frequency  NEURO: no headache, no dizziness  PSYCH: no depression, not anxious  Derm : no itching, no rash     ---___---___---___---___---___---          <<<  VITAL SIGNS: >>>    T(F): 98.5 (11-10-18 @ 05:28), Max: 98.7 (11-09-18 @ 12:53)  HR: 91 (11-10-18 @ 05:28) (91 - 106)  BP: 135/74 (11-10-18 @ 05:28) (118/57 - 154/68)  RR: 18 (11-10-18 @ 05:28) (18 - 18)  SpO2: 96% (11-10-18 @ 05:28) (90% - 96%)  Wt(kg): --  CAPILLARY BLOOD GLUCOSE      POCT Blood Glucose.: 159 mg/dL (10 Nov 2018 07:32)    I&O's Summary    09 Nov 2018 07:01  -  10 Nov 2018 07:00  --------------------------------------------------------  IN: 3484 mL / OUT: 1125 mL / NET: 2359 mL         ---___---___---___---___---___---                       PHYSICAL EXAM:    GEN: A&O X 2 , NAD , comfortable  HEENT: NCAT, PERRL, MMM, no scleral icterus, hearing intact  NECK: Supple, No JVD  CVS: S1S2 , regular , No M/R/G appreciated  PULM: CTA B/L,  no W/R/R appreciated  ABD.: soft. non tender, non distended,  bowel sounds present (+) gt tube   Extrem: intact pulses , no edema noted  Derm: No rash or ecchymosis noted  PSYCH: normal mood,  depression,  anxious     ---___---___---___---___---___---     <<<  LAB AND IMAGING: >>>                          10.5   13.9  )-----------( 383      ( 09 Nov 2018 18:45 )             30.8               11-09    134<L>  |  99  |  16  ----------------------------<  143<H>  3.4<L>   |  23  |  0.42<L>    Ca    8.6      09 Nov 2018 08:50                                 [All pertinent / recent available Imaging reports and other labs reviewed]     ---___---___---___---___---___---___ ---___---___---___---___---           <<<  A S S E S S M E N T   A N D   P L A N :  >>>                    >>______________________<<      Deniz Bolden .         phone   [phone]

## 2018-11-10 NOTE — PROGRESS NOTE ADULT - ASSESSMENT
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

## 2018-11-11 LAB
ANION GAP SERPL CALC-SCNC: 11 MMOL/L — SIGNIFICANT CHANGE UP (ref 5–17)
BUN SERPL-MCNC: 18 MG/DL — SIGNIFICANT CHANGE UP (ref 7–23)
CALCIUM SERPL-MCNC: 8.5 MG/DL — SIGNIFICANT CHANGE UP (ref 8.4–10.5)
CHLORIDE SERPL-SCNC: 101 MMOL/L — SIGNIFICANT CHANGE UP (ref 96–108)
CO2 SERPL-SCNC: 22 MMOL/L — SIGNIFICANT CHANGE UP (ref 22–31)
CREAT SERPL-MCNC: 0.37 MG/DL — LOW (ref 0.5–1.3)
CULTURE RESULTS: SIGNIFICANT CHANGE UP
CULTURE RESULTS: SIGNIFICANT CHANGE UP
GLUCOSE BLDC GLUCOMTR-MCNC: 113 MG/DL — HIGH (ref 70–99)
GLUCOSE BLDC GLUCOMTR-MCNC: 116 MG/DL — HIGH (ref 70–99)
GLUCOSE BLDC GLUCOMTR-MCNC: 159 MG/DL — HIGH (ref 70–99)
GLUCOSE BLDC GLUCOMTR-MCNC: 174 MG/DL — HIGH (ref 70–99)
GLUCOSE SERPL-MCNC: 130 MG/DL — HIGH (ref 70–99)
HCT VFR BLD CALC: 27.5 % — LOW (ref 34.5–45)
HGB BLD-MCNC: 9.4 G/DL — LOW (ref 11.5–15.5)
MAGNESIUM SERPL-MCNC: 1.7 MG/DL — SIGNIFICANT CHANGE UP (ref 1.6–2.6)
MCHC RBC-ENTMCNC: 30.8 PG — SIGNIFICANT CHANGE UP (ref 27–34)
MCHC RBC-ENTMCNC: 34.2 GM/DL — SIGNIFICANT CHANGE UP (ref 32–36)
MCV RBC AUTO: 90.2 FL — SIGNIFICANT CHANGE UP (ref 80–100)
PLATELET # BLD AUTO: 309 K/UL — SIGNIFICANT CHANGE UP (ref 150–400)
POTASSIUM SERPL-MCNC: 3.2 MMOL/L — LOW (ref 3.5–5.3)
POTASSIUM SERPL-SCNC: 3.2 MMOL/L — LOW (ref 3.5–5.3)
RBC # BLD: 3.05 M/UL — LOW (ref 3.8–5.2)
RBC # FLD: 14.9 % — HIGH (ref 10.3–14.5)
SODIUM SERPL-SCNC: 134 MMOL/L — LOW (ref 135–145)
SPECIMEN SOURCE: SIGNIFICANT CHANGE UP
SPECIMEN SOURCE: SIGNIFICANT CHANGE UP
WBC # BLD: 8.83 K/UL — SIGNIFICANT CHANGE UP (ref 3.8–10.5)
WBC # FLD AUTO: 8.83 K/UL — SIGNIFICANT CHANGE UP (ref 3.8–10.5)

## 2018-11-11 PROCEDURE — 36430 TRANSFUSION BLD/BLD COMPNT: CPT

## 2018-11-11 PROCEDURE — 76000 FLUOROSCOPY <1 HR PHYS/QHP: CPT

## 2018-11-11 PROCEDURE — 82803 BLOOD GASES ANY COMBINATION: CPT

## 2018-11-11 PROCEDURE — 71045 X-RAY EXAM CHEST 1 VIEW: CPT

## 2018-11-11 PROCEDURE — 90686 IIV4 VACC NO PRSV 0.5 ML IM: CPT

## 2018-11-11 PROCEDURE — 83521 IG LIGHT CHAINS FREE EACH: CPT

## 2018-11-11 PROCEDURE — 86334 IMMUNOFIX E-PHORESIS SERUM: CPT

## 2018-11-11 PROCEDURE — 82570 ASSAY OF URINE CREATININE: CPT

## 2018-11-11 PROCEDURE — 86147 CARDIOLIPIN ANTIBODY EA IG: CPT

## 2018-11-11 PROCEDURE — 73700 CT LOWER EXTREMITY W/O DYE: CPT

## 2018-11-11 PROCEDURE — 80053 COMPREHEN METABOLIC PANEL: CPT

## 2018-11-11 PROCEDURE — 85730 THROMBOPLASTIN TIME PARTIAL: CPT

## 2018-11-11 PROCEDURE — 84295 ASSAY OF SERUM SODIUM: CPT

## 2018-11-11 PROCEDURE — 96375 TX/PRO/DX INJ NEW DRUG ADDON: CPT | Mod: XU

## 2018-11-11 PROCEDURE — 87150 DNA/RNA AMPLIFIED PROBE: CPT

## 2018-11-11 PROCEDURE — 87186 SC STD MICRODIL/AGAR DIL: CPT

## 2018-11-11 PROCEDURE — 84145 PROCALCITONIN (PCT): CPT

## 2018-11-11 PROCEDURE — 94003 VENT MGMT INPAT SUBQ DAY: CPT

## 2018-11-11 PROCEDURE — 87102 FUNGUS ISOLATION CULTURE: CPT

## 2018-11-11 PROCEDURE — 83020 HEMOGLOBIN ELECTROPHORESIS: CPT

## 2018-11-11 PROCEDURE — 93970 EXTREMITY STUDY: CPT

## 2018-11-11 PROCEDURE — 84132 ASSAY OF SERUM POTASSIUM: CPT

## 2018-11-11 PROCEDURE — C1713: CPT

## 2018-11-11 PROCEDURE — 80202 ASSAY OF VANCOMYCIN: CPT

## 2018-11-11 PROCEDURE — L8699: CPT

## 2018-11-11 PROCEDURE — 84466 ASSAY OF TRANSFERRIN: CPT

## 2018-11-11 PROCEDURE — 97535 SELF CARE MNGMENT TRAINING: CPT

## 2018-11-11 PROCEDURE — 87070 CULTURE OTHR SPECIMN AEROBIC: CPT

## 2018-11-11 PROCEDURE — 74176 CT ABD & PELVIS W/O CONTRAST: CPT

## 2018-11-11 PROCEDURE — 94640 AIRWAY INHALATION TREATMENT: CPT

## 2018-11-11 PROCEDURE — 99285 EMERGENCY DEPT VISIT HI MDM: CPT | Mod: 25

## 2018-11-11 PROCEDURE — 87486 CHLMYD PNEUM DNA AMP PROBE: CPT

## 2018-11-11 PROCEDURE — 36569 INSJ PICC 5 YR+ W/O IMAGING: CPT

## 2018-11-11 PROCEDURE — 92610 EVALUATE SWALLOWING FUNCTION: CPT

## 2018-11-11 PROCEDURE — 97605 NEG PRS WND THER DME<=50SQCM: CPT

## 2018-11-11 PROCEDURE — 82310 ASSAY OF CALCIUM: CPT

## 2018-11-11 PROCEDURE — 82306 VITAMIN D 25 HYDROXY: CPT

## 2018-11-11 PROCEDURE — 84480 ASSAY TRIIODOTHYRONINE (T3): CPT

## 2018-11-11 PROCEDURE — 84100 ASSAY OF PHOSPHORUS: CPT

## 2018-11-11 PROCEDURE — 73610 X-RAY EXAM OF ANKLE: CPT

## 2018-11-11 PROCEDURE — 88304 TISSUE EXAM BY PATHOLOGIST: CPT

## 2018-11-11 PROCEDURE — 84133 ASSAY OF URINE POTASSIUM: CPT

## 2018-11-11 PROCEDURE — 87206 SMEAR FLUORESCENT/ACID STAI: CPT

## 2018-11-11 PROCEDURE — 86901 BLOOD TYPING SEROLOGIC RH(D): CPT

## 2018-11-11 PROCEDURE — C1751: CPT

## 2018-11-11 PROCEDURE — 36584 COMPL RPLCMT PICC RS&I: CPT

## 2018-11-11 PROCEDURE — 85027 COMPLETE CBC AUTOMATED: CPT

## 2018-11-11 PROCEDURE — 87633 RESP VIRUS 12-25 TARGETS: CPT

## 2018-11-11 PROCEDURE — 86160 COMPLEMENT ANTIGEN: CPT

## 2018-11-11 PROCEDURE — 93312 ECHO TRANSESOPHAGEAL: CPT

## 2018-11-11 PROCEDURE — 97110 THERAPEUTIC EXERCISES: CPT

## 2018-11-11 PROCEDURE — 82436 ASSAY OF URINE CHLORIDE: CPT

## 2018-11-11 PROCEDURE — 84436 ASSAY OF TOTAL THYROXINE: CPT

## 2018-11-11 PROCEDURE — 85045 AUTOMATED RETICULOCYTE COUNT: CPT

## 2018-11-11 PROCEDURE — 87075 CULTR BACTERIA EXCEPT BLOOD: CPT

## 2018-11-11 PROCEDURE — 87449 NOS EACH ORGANISM AG IA: CPT

## 2018-11-11 PROCEDURE — 83615 LACTATE (LD) (LDH) ENZYME: CPT

## 2018-11-11 PROCEDURE — 83550 IRON BINDING TEST: CPT

## 2018-11-11 PROCEDURE — 87581 M.PNEUMON DNA AMP PROBE: CPT

## 2018-11-11 PROCEDURE — 97164 PT RE-EVAL EST PLAN CARE: CPT

## 2018-11-11 PROCEDURE — C8929: CPT

## 2018-11-11 PROCEDURE — 83970 ASSAY OF PARATHORMONE: CPT

## 2018-11-11 PROCEDURE — 74018 RADEX ABDOMEN 1 VIEW: CPT

## 2018-11-11 PROCEDURE — 82784 ASSAY IGA/IGD/IGG/IGM EACH: CPT

## 2018-11-11 PROCEDURE — 83605 ASSAY OF LACTIC ACID: CPT

## 2018-11-11 PROCEDURE — 71250 CT THORAX DX C-: CPT

## 2018-11-11 PROCEDURE — 84300 ASSAY OF URINE SODIUM: CPT

## 2018-11-11 PROCEDURE — C1769: CPT

## 2018-11-11 PROCEDURE — 82435 ASSAY OF BLOOD CHLORIDE: CPT

## 2018-11-11 PROCEDURE — 93971 EXTREMITY STUDY: CPT

## 2018-11-11 PROCEDURE — 82533 TOTAL CORTISOL: CPT

## 2018-11-11 PROCEDURE — 83525 ASSAY OF INSULIN: CPT

## 2018-11-11 PROCEDURE — 97530 THERAPEUTIC ACTIVITIES: CPT

## 2018-11-11 PROCEDURE — 83010 ASSAY OF HAPTOGLOBIN QUANT: CPT

## 2018-11-11 PROCEDURE — 72170 X-RAY EXAM OF PELVIS: CPT

## 2018-11-11 PROCEDURE — 82550 ASSAY OF CK (CPK): CPT

## 2018-11-11 PROCEDURE — 86225 DNA ANTIBODY NATIVE: CPT

## 2018-11-11 PROCEDURE — 86923 COMPATIBILITY TEST ELECTRIC: CPT

## 2018-11-11 PROCEDURE — C1776: CPT

## 2018-11-11 PROCEDURE — P9045: CPT

## 2018-11-11 PROCEDURE — 96374 THER/PROPH/DIAG INJ IV PUSH: CPT | Mod: XU

## 2018-11-11 PROCEDURE — 87015 SPECIMEN INFECT AGNT CONCNTJ: CPT

## 2018-11-11 PROCEDURE — 82947 ASSAY GLUCOSE BLOOD QUANT: CPT

## 2018-11-11 PROCEDURE — 73110 X-RAY EXAM OF WRIST: CPT

## 2018-11-11 PROCEDURE — 83036 HEMOGLOBIN GLYCOSYLATED A1C: CPT

## 2018-11-11 PROCEDURE — 85652 RBC SED RATE AUTOMATED: CPT

## 2018-11-11 PROCEDURE — 88300 SURGICAL PATH GROSS: CPT

## 2018-11-11 PROCEDURE — 85014 HEMATOCRIT: CPT

## 2018-11-11 PROCEDURE — 87116 MYCOBACTERIA CULTURE: CPT

## 2018-11-11 PROCEDURE — 80076 HEPATIC FUNCTION PANEL: CPT

## 2018-11-11 PROCEDURE — 83735 ASSAY OF MAGNESIUM: CPT

## 2018-11-11 PROCEDURE — 82010 KETONE BODYS QUAN: CPT

## 2018-11-11 PROCEDURE — 86038 ANTINUCLEAR ANTIBODIES: CPT

## 2018-11-11 PROCEDURE — 77001 FLUOROGUIDE FOR VEIN DEVICE: CPT

## 2018-11-11 PROCEDURE — 72190 X-RAY EXAM OF PELVIS: CPT

## 2018-11-11 PROCEDURE — 97163 PT EVAL HIGH COMPLEX 45 MIN: CPT

## 2018-11-11 PROCEDURE — 86850 RBC ANTIBODY SCREEN: CPT

## 2018-11-11 PROCEDURE — P9040: CPT

## 2018-11-11 PROCEDURE — 73551 X-RAY EXAM OF FEMUR 1: CPT

## 2018-11-11 PROCEDURE — 87086 URINE CULTURE/COLONY COUNT: CPT

## 2018-11-11 PROCEDURE — C1889: CPT

## 2018-11-11 PROCEDURE — 87324 CLOSTRIDIUM AG IA: CPT

## 2018-11-11 PROCEDURE — 82565 ASSAY OF CREATININE: CPT

## 2018-11-11 PROCEDURE — 97112 NEUROMUSCULAR REEDUCATION: CPT

## 2018-11-11 PROCEDURE — 85610 PROTHROMBIN TIME: CPT

## 2018-11-11 PROCEDURE — 80048 BASIC METABOLIC PNL TOTAL CA: CPT

## 2018-11-11 PROCEDURE — 86140 C-REACTIVE PROTEIN: CPT

## 2018-11-11 PROCEDURE — 93005 ELECTROCARDIOGRAM TRACING: CPT

## 2018-11-11 PROCEDURE — 96376 TX/PRO/DX INJ SAME DRUG ADON: CPT | Mod: XU

## 2018-11-11 PROCEDURE — 86235 NUCLEAR ANTIGEN ANTIBODY: CPT

## 2018-11-11 PROCEDURE — 82553 CREATINE MB FRACTION: CPT

## 2018-11-11 PROCEDURE — 70450 CT HEAD/BRAIN W/O DYE: CPT

## 2018-11-11 PROCEDURE — 51701 INSERT BLADDER CATHETER: CPT

## 2018-11-11 PROCEDURE — 94002 VENT MGMT INPAT INIT DAY: CPT

## 2018-11-11 PROCEDURE — 81001 URINALYSIS AUTO W/SCOPE: CPT

## 2018-11-11 PROCEDURE — 94660 CPAP INITIATION&MGMT: CPT

## 2018-11-11 PROCEDURE — 84443 ASSAY THYROID STIM HORMONE: CPT

## 2018-11-11 PROCEDURE — 73552 X-RAY EXAM OF FEMUR 2/>: CPT

## 2018-11-11 PROCEDURE — 83935 ASSAY OF URINE OSMOLALITY: CPT

## 2018-11-11 PROCEDURE — 97166 OT EVAL MOD COMPLEX 45 MIN: CPT

## 2018-11-11 PROCEDURE — 84681 ASSAY OF C-PEPTIDE: CPT

## 2018-11-11 PROCEDURE — P9016: CPT

## 2018-11-11 PROCEDURE — 86900 BLOOD TYPING SEROLOGIC ABO: CPT

## 2018-11-11 PROCEDURE — 87040 BLOOD CULTURE FOR BACTERIA: CPT

## 2018-11-11 PROCEDURE — 88311 DECALCIFY TISSUE: CPT

## 2018-11-11 PROCEDURE — 87798 DETECT AGENT NOS DNA AMP: CPT

## 2018-11-11 PROCEDURE — 82962 GLUCOSE BLOOD TEST: CPT

## 2018-11-11 PROCEDURE — G0515: CPT

## 2018-11-11 PROCEDURE — 74230 X-RAY XM SWLNG FUNCJ C+: CPT

## 2018-11-11 PROCEDURE — 84484 ASSAY OF TROPONIN QUANT: CPT

## 2018-11-11 PROCEDURE — 82330 ASSAY OF CALCIUM: CPT

## 2018-11-11 PROCEDURE — 73502 X-RAY EXAM HIP UNI 2-3 VIEWS: CPT

## 2018-11-11 PROCEDURE — 93306 TTE W/DOPPLER COMPLETE: CPT

## 2018-11-11 RX ORDER — POTASSIUM CHLORIDE 20 MEQ
40 PACKET (EA) ORAL ONCE
Qty: 0 | Refills: 0 | Status: COMPLETED | OUTPATIENT
Start: 2018-11-11 | End: 2018-11-11

## 2018-11-11 RX ADMIN — HYDROMORPHONE HYDROCHLORIDE 2 MILLIGRAM(S): 2 INJECTION INTRAMUSCULAR; INTRAVENOUS; SUBCUTANEOUS at 14:20

## 2018-11-11 RX ADMIN — MINOCYCLINE HYDROCHLORIDE 100 MILLIGRAM(S): 45 TABLET, EXTENDED RELEASE ORAL at 18:04

## 2018-11-11 RX ADMIN — BUDESONIDE AND FORMOTEROL FUMARATE DIHYDRATE 2 PUFF(S): 160; 4.5 AEROSOL RESPIRATORY (INHALATION) at 06:33

## 2018-11-11 RX ADMIN — MUPIROCIN 1 APPLICATION(S): 20 OINTMENT TOPICAL at 18:04

## 2018-11-11 RX ADMIN — MUPIROCIN 1 APPLICATION(S): 20 OINTMENT TOPICAL at 06:34

## 2018-11-11 RX ADMIN — SODIUM CHLORIDE 75 MILLILITER(S): 9 INJECTION INTRAMUSCULAR; INTRAVENOUS; SUBCUTANEOUS at 23:15

## 2018-11-11 RX ADMIN — SODIUM CHLORIDE 75 MILLILITER(S): 9 INJECTION INTRAMUSCULAR; INTRAVENOUS; SUBCUTANEOUS at 13:51

## 2018-11-11 RX ADMIN — ERGOCALCIFEROL 8000 UNIT(S): 1.25 CAPSULE ORAL at 12:30

## 2018-11-11 RX ADMIN — LIDOCAINE 1 PATCH: 4 CREAM TOPICAL at 12:33

## 2018-11-11 RX ADMIN — TIOTROPIUM BROMIDE 1 CAPSULE(S): 18 CAPSULE ORAL; RESPIRATORY (INHALATION) at 12:29

## 2018-11-11 RX ADMIN — Medication 0.5 MILLIGRAM(S): at 15:54

## 2018-11-11 RX ADMIN — CEFEPIME 100 MILLIGRAM(S): 1 INJECTION, POWDER, FOR SOLUTION INTRAMUSCULAR; INTRAVENOUS at 06:31

## 2018-11-11 RX ADMIN — HYDROMORPHONE HYDROCHLORIDE 2 MILLIGRAM(S): 2 INJECTION INTRAMUSCULAR; INTRAVENOUS; SUBCUTANEOUS at 13:50

## 2018-11-11 RX ADMIN — Medication 1: at 12:29

## 2018-11-11 RX ADMIN — BUDESONIDE AND FORMOTEROL FUMARATE DIHYDRATE 2 PUFF(S): 160; 4.5 AEROSOL RESPIRATORY (INHALATION) at 18:03

## 2018-11-11 RX ADMIN — LIDOCAINE 1 PATCH: 4 CREAM TOPICAL at 00:27

## 2018-11-11 RX ADMIN — Medication 40 MILLIEQUIVALENT(S): at 12:29

## 2018-11-11 RX ADMIN — GABAPENTIN 300 MILLIGRAM(S): 400 CAPSULE ORAL at 21:52

## 2018-11-11 RX ADMIN — FENTANYL CITRATE 1 PATCH: 50 INJECTION INTRAVENOUS at 19:15

## 2018-11-11 RX ADMIN — MIRTAZAPINE 7.5 MILLIGRAM(S): 45 TABLET, ORALLY DISINTEGRATING ORAL at 21:52

## 2018-11-11 RX ADMIN — SERTRALINE 50 MILLIGRAM(S): 25 TABLET, FILM COATED ORAL at 12:29

## 2018-11-11 RX ADMIN — MINOCYCLINE HYDROCHLORIDE 100 MILLIGRAM(S): 45 TABLET, EXTENDED RELEASE ORAL at 06:33

## 2018-11-11 RX ADMIN — ALBUTEROL 2 PUFF(S): 90 AEROSOL, METERED ORAL at 18:04

## 2018-11-11 RX ADMIN — HYDROMORPHONE HYDROCHLORIDE 2 MILLIGRAM(S): 2 INJECTION INTRAMUSCULAR; INTRAVENOUS; SUBCUTANEOUS at 06:52

## 2018-11-11 RX ADMIN — TIZANIDINE 4 MILLIGRAM(S): 4 TABLET ORAL at 20:41

## 2018-11-11 RX ADMIN — GABAPENTIN 300 MILLIGRAM(S): 400 CAPSULE ORAL at 13:50

## 2018-11-11 RX ADMIN — Medication 7.5 MILLIGRAM(S): at 06:33

## 2018-11-11 RX ADMIN — GABAPENTIN 300 MILLIGRAM(S): 400 CAPSULE ORAL at 06:33

## 2018-11-11 RX ADMIN — QUETIAPINE FUMARATE 25 MILLIGRAM(S): 200 TABLET, FILM COATED ORAL at 21:52

## 2018-11-11 RX ADMIN — LIDOCAINE 1 PATCH: 4 CREAM TOPICAL at 19:57

## 2018-11-11 RX ADMIN — NYSTATIN CREAM 1 APPLICATION(S): 100000 CREAM TOPICAL at 13:50

## 2018-11-11 RX ADMIN — Medication 1.5 MILLIGRAM(S): at 21:53

## 2018-11-11 RX ADMIN — ALBUTEROL 2 PUFF(S): 90 AEROSOL, METERED ORAL at 23:14

## 2018-11-11 RX ADMIN — PANTOPRAZOLE SODIUM 40 MILLIGRAM(S): 20 TABLET, DELAYED RELEASE ORAL at 06:33

## 2018-11-11 RX ADMIN — FENTANYL CITRATE 1 PATCH: 50 INJECTION INTRAVENOUS at 07:54

## 2018-11-11 RX ADMIN — ALBUTEROL 2 PUFF(S): 90 AEROSOL, METERED ORAL at 06:33

## 2018-11-11 RX ADMIN — CEFEPIME 100 MILLIGRAM(S): 1 INJECTION, POWDER, FOR SOLUTION INTRAMUSCULAR; INTRAVENOUS at 18:04

## 2018-11-11 RX ADMIN — Medication 300 MILLIGRAM(S): at 12:30

## 2018-11-11 RX ADMIN — CLOPIDOGREL BISULFATE 75 MILLIGRAM(S): 75 TABLET, FILM COATED ORAL at 12:30

## 2018-11-11 RX ADMIN — TIZANIDINE 4 MILLIGRAM(S): 4 TABLET ORAL at 09:09

## 2018-11-11 RX ADMIN — HYDROMORPHONE HYDROCHLORIDE 2 MILLIGRAM(S): 2 INJECTION INTRAMUSCULAR; INTRAVENOUS; SUBCUTANEOUS at 07:23

## 2018-11-11 RX ADMIN — HUMAN INSULIN 6 UNIT(S): 100 INJECTION, SUSPENSION SUBCUTANEOUS at 07:15

## 2018-11-11 RX ADMIN — Medication 1: at 07:17

## 2018-11-11 RX ADMIN — ALBUTEROL 2 PUFF(S): 90 AEROSOL, METERED ORAL at 12:29

## 2018-11-11 NOTE — PROGRESS NOTE ADULT - SUBJECTIVE AND OBJECTIVE BOX
CC: f/u for pneumonia     Patient at baseline, somnolent but arouses, follows commands, no distress    REVIEW OF SYSTEMS:  All other review of systems negative (Comprehensive ROS)- limited, d/w daughter at bedside    Antimicrobials Day # 6  cefepime   IVPB 1000 milliGRAM(s) IV Intermittent every 12 hours  minocycline 100 milliGRAM(s) Oral two times a day    Other Medications Reviewed    Vital Signs Last 24 Hrs  T(F): 97.7 (11 Nov 2018 13:54), Max: 98.5 (10 Nov 2018 21:39)  HR: 90 (11 Nov 2018 13:54) (90 - 99)  BP: 126/74 (11 Nov 2018 13:54) (126/74 - 150/88)  BP(mean): --  RR: 18 (11 Nov 2018 13:54) (18 - 18)  SpO2: 97% (11 Nov 2018 13:54) (95% - 97%)    PHYSICAL EXAM:  General: no acute distress  Eyes:  anicteric, no conjunctival injection, no discharge  Oropharynx: no lesions or injection 	  Neck: supple, without adenopathy  Lungs: clear to auscultation anteriorly   Heart: regular rate and rhythm; no murmur, rubs or gallops  Abdomen: soft, nondistended, nontender, G tube clean  Blackman  Skin: faint papular rash chest fading  Extremities: R hip/thigh incision intact, except most distal aspect, very shallow ulceration- clean  Neurologic: somnolent, moves all extremities    LAB RESULTS:                        9.4    8.83  )-----------( 309      ( 11 Nov 2018 09:13 )             27.5   11-11    134<L>  |  101  |  18  ----------------------------<  130<H>  3.2<L>   |  22  |  0.37<L>    Ca    8.5      11 Nov 2018 07:45  Mg     1.7     11-11    Trend Procalcitonin  11-10-18 @ 06:00   -  0.45<H>  11-09-18 @ 08:50   -  0.46<H>    MICROBIOLOGY:  RECENT CULTURES:  11-06 @ 07:05 .Urine Catheterized Pseudomonas aeruginosa (Carbapenem Resistant)    >100,000 CFU/ml Pseudomonas aeruginosa (Carbapenem Resistant)    11-06 @ 06:14 .Blood Blood-Peripheral     No growth to date.    RADIOLOGY REVIEWED:  Xray Chest 1 View- PORTABLE-Urgent (11.06.18 @ 03:37) >  Patchy opacities in the right upper lung field, may represent   infection. Chronic lung changes cannot be ruled out entirely.

## 2018-11-11 NOTE — PROGRESS NOTE ADULT - ASSESSMENT
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

## 2018-11-11 NOTE — PROGRESS NOTE ADULT - SUBJECTIVE AND OBJECTIVE BOX
---___---___---___---___---___---___ ---___---___---___---___---___---___---___---___---___---                    <<<  M E D I C A L   A T T E N D I N G    F O L L O W    U P   N O T E  >>>    - Patient seen and examined by me approximately thirty minutes ago.  - In summary, patient is a 68y year old Female who origianlly presented with ams and uti and pneumonia   - Patient today overall doing ok, comfortable, eating OK.     ---___---___---___---___---___---      <<<  MEDICATIONS:  >>>    MEDICATIONS  (STANDING):  ALBUTerol    90 MICROgram(s) HFA Inhaler 2 Puff(s) Inhalation every 6 hours  buDESOnide 160 MICROgram(s)/formoterol 4.5 MICROgram(s) Inhaler 2 Puff(s) Inhalation two times a day  cefepime   IVPB 1000 milliGRAM(s) IV Intermittent every 12 hours  clonazePAM Tablet 1.5 milliGRAM(s) Oral at bedtime  clopidogrel Tablet 75 milliGRAM(s) Oral daily  dextrose 5%. 1000 milliLiter(s) (50 mL/Hr) IV Continuous <Continuous>  dextrose 50% Injectable 12.5 Gram(s) IV Push once  dextrose 50% Injectable 25 Gram(s) IV Push once  dextrose 50% Injectable 25 Gram(s) IV Push once  ergocalciferol Drops 8000 Unit(s) Oral daily  fentaNYL   Patch  12 MICROgram(s)/Hr 1 Patch Transdermal every 72 hours  ferrous    sulfate Liquid 300 milliGRAM(s) Enteral Tube daily  gabapentin 300 milliGRAM(s) Oral every 8 hours  insulin lispro (HumaLOG) corrective regimen sliding scale   SubCutaneous every 6 hours  insulin NPH human recombinant 6 Unit(s) SubCutaneous before breakfast  lidocaine   Patch 1 Patch Transdermal daily  medical marijuana oil 1 milliLiter(s) 1 milliLiter(s) SubLingual <User Schedule>  minocycline 100 milliGRAM(s) Oral two times a day  mirtazapine 7.5 milliGRAM(s) Oral at bedtime  mupirocin 2% Ointment 1 Application(s) Topical two times a day  pantoprazole   Suspension 40 milliGRAM(s) Oral before breakfast  predniSONE   Tablet 7.5 milliGRAM(s) Oral daily  QUEtiapine 25 milliGRAM(s) Oral at bedtime  sertraline 50 milliGRAM(s) Oral daily  sodium chloride 0.9%. 1000 milliLiter(s) (75 mL/Hr) IV Continuous <Continuous>  tiotropium 18 MICROgram(s) Capsule 1 Capsule(s) Inhalation daily  tiZANidine 4 milliGRAM(s) Oral <User Schedule>      MEDICATIONS  (PRN):  acetaminophen    Suspension .. 650 milliGRAM(s) Oral every 8 hours PRN Mild Pain (1 - 3)  dextrose 40% Gel 15 Gram(s) Oral once PRN Blood Glucose LESS THAN 70 milliGRAM(s)/deciliter  glucagon  Injectable 1 milliGRAM(s) IntraMuscular once PRN Glucose LESS THAN 70 milligrams/deciliter  HYDROmorphone   Tablet 2 milliGRAM(s) Enteral Tube every 4 hours PRN Moderate Pain (4 - 6)  LORazepam     Tablet 0.5 milliGRAM(s) Oral every 12 hours PRN Agitation  nystatin Powder 1 Application(s) Topical two times a day PRN rash/itchiness       ---___---___---___---___---___---     <<<REVIEW OF SYSTEM: >>>    GEN: no fever, no chills, no pain  RESP: no SOB, no cough, no sputum  CVS: no chest pain, no palpitations, no edema  GI: no abdominal pain, no nausea, no vomiting, no constipation, no diarrhea  : no dysurea, no frequency  NEURO: no headache, no dizziness  PSYCH: no depression, not anxious  Derm : no itching, no rash     ---___---___---___---___---___---          <<<  VITAL SIGNS: >>>    T(F): 97.7 (11-11-18 @ 13:54), Max: 98.5 (11-10-18 @ 21:39)  HR: 90 (11-11-18 @ 13:54) (90 - 99)  BP: 126/74 (11-11-18 @ 13:54) (126/74 - 150/88)  RR: 18 (11-11-18 @ 13:54) (18 - 18)  SpO2: 97% (11-11-18 @ 13:54) (95% - 97%)  Wt(kg): --  CAPILLARY BLOOD GLUCOSE      POCT Blood Glucose.: 174 mg/dL (11 Nov 2018 11:58)    I&O's Summary    10 Nov 2018 07:01  -  11 Nov 2018 07:00  --------------------------------------------------------  IN: 3420 mL / OUT: 700 mL / NET: 2720 mL    11 Nov 2018 07:01  -  11 Nov 2018 14:29  --------------------------------------------------------  IN: 150 mL / OUT: 0 mL / NET: 150 mL         ---___---___---___---___---___---                       PHYSICAL EXAM:    GEN: A&O X 3 , NAD , comfortable  HEENT: NCAT, PERRL, MMM, no scleral icterus, hearing intact  NECK: Supple, No JVD  CVS: S1S2 , regular , No M/R/G appreciated  PULM: CTA B/L,  no W/R/R appreciated  ABD.: soft. non tender, non distended,  bowel sounds present peg nioted   Extrem: intact pulses , no edema noted  Derm: skn breakdwn on the sacral area noted   PSYCH: normal mood, no depression, not anxious     ---___---___---___---___---___---     <<<  LAB AND IMAGING: >>>                          9.4    8.83  )-----------( 309      ( 11 Nov 2018 09:13 )             27.5               11-11    134<L>  |  101  |  18  ----------------------------<  130<H>  3.2<L>   |  22  |  0.37<L>    Ca    8.5      11 Nov 2018 07:45  Mg     1.7     11-11                                 [All pertinent / recent available Imaging reports and other labs reviewed]     ---___---___---___---___---___---___ ---___---___---___---___---           <<<  A S S E S S M E N T   A N D   P L A N :  >>>          -GI/DVT Prophylaxis.    --------------------------------------------       >>______________________<<      Deniz Bolden .         phone   [phone]

## 2018-11-12 DIAGNOSIS — R73.02 IMPAIRED GLUCOSE TOLERANCE (ORAL): ICD-10-CM

## 2018-11-12 DIAGNOSIS — J18.9 PNEUMONIA, UNSPECIFIED ORGANISM: ICD-10-CM

## 2018-11-12 LAB
ANION GAP SERPL CALC-SCNC: 11 MMOL/L — SIGNIFICANT CHANGE UP (ref 5–17)
BUN SERPL-MCNC: 18 MG/DL — SIGNIFICANT CHANGE UP (ref 7–23)
CALCIUM SERPL-MCNC: 8.5 MG/DL — SIGNIFICANT CHANGE UP (ref 8.4–10.5)
CHLORIDE SERPL-SCNC: 102 MMOL/L — SIGNIFICANT CHANGE UP (ref 96–108)
CO2 SERPL-SCNC: 21 MMOL/L — LOW (ref 22–31)
CREAT SERPL-MCNC: 0.37 MG/DL — LOW (ref 0.5–1.3)
GLUCOSE BLDC GLUCOMTR-MCNC: 116 MG/DL — HIGH (ref 70–99)
GLUCOSE BLDC GLUCOMTR-MCNC: 117 MG/DL — HIGH (ref 70–99)
GLUCOSE BLDC GLUCOMTR-MCNC: 166 MG/DL — HIGH (ref 70–99)
GLUCOSE BLDC GLUCOMTR-MCNC: 180 MG/DL — HIGH (ref 70–99)
GLUCOSE BLDC GLUCOMTR-MCNC: 96 MG/DL — SIGNIFICANT CHANGE UP (ref 70–99)
GLUCOSE BLDC GLUCOMTR-MCNC: 99 MG/DL — SIGNIFICANT CHANGE UP (ref 70–99)
GLUCOSE SERPL-MCNC: 128 MG/DL — HIGH (ref 70–99)
HCT VFR BLD CALC: 28.3 % — LOW (ref 34.5–45)
HGB BLD-MCNC: 9.7 G/DL — LOW (ref 11.5–15.5)
MAGNESIUM SERPL-MCNC: 1.5 MG/DL — LOW (ref 1.6–2.6)
MCHC RBC-ENTMCNC: 31 PG — SIGNIFICANT CHANGE UP (ref 27–34)
MCHC RBC-ENTMCNC: 34.3 GM/DL — SIGNIFICANT CHANGE UP (ref 32–36)
MCV RBC AUTO: 90.4 FL — SIGNIFICANT CHANGE UP (ref 80–100)
PLATELET # BLD AUTO: 318 K/UL — SIGNIFICANT CHANGE UP (ref 150–400)
POTASSIUM SERPL-MCNC: 3.6 MMOL/L — SIGNIFICANT CHANGE UP (ref 3.5–5.3)
POTASSIUM SERPL-SCNC: 3.6 MMOL/L — SIGNIFICANT CHANGE UP (ref 3.5–5.3)
RBC # BLD: 3.13 M/UL — LOW (ref 3.8–5.2)
RBC # FLD: 14.9 % — HIGH (ref 10.3–14.5)
SODIUM SERPL-SCNC: 134 MMOL/L — LOW (ref 135–145)
WBC # BLD: 9.97 K/UL — SIGNIFICANT CHANGE UP (ref 3.8–10.5)
WBC # FLD AUTO: 9.97 K/UL — SIGNIFICANT CHANGE UP (ref 3.8–10.5)

## 2018-11-12 RX ORDER — IPRATROPIUM/ALBUTEROL SULFATE 18-103MCG
3 AEROSOL WITH ADAPTER (GRAM) INHALATION ONCE
Qty: 0 | Refills: 0 | Status: COMPLETED | OUTPATIENT
Start: 2018-11-12 | End: 2018-11-12

## 2018-11-12 RX ORDER — DIPHENHYDRAMINE HCL 50 MG
25 CAPSULE ORAL ONCE
Qty: 0 | Refills: 0 | Status: COMPLETED | OUTPATIENT
Start: 2018-11-12 | End: 2018-11-12

## 2018-11-12 RX ORDER — CHLORHEXIDINE GLUCONATE 213 G/1000ML
1 SOLUTION TOPICAL
Qty: 0 | Refills: 0 | Status: DISCONTINUED | OUTPATIENT
Start: 2018-11-12 | End: 2018-11-19

## 2018-11-12 RX ADMIN — PANTOPRAZOLE SODIUM 40 MILLIGRAM(S): 20 TABLET, DELAYED RELEASE ORAL at 06:26

## 2018-11-12 RX ADMIN — Medication 1: at 13:19

## 2018-11-12 RX ADMIN — SODIUM CHLORIDE 75 MILLILITER(S): 9 INJECTION INTRAMUSCULAR; INTRAVENOUS; SUBCUTANEOUS at 08:25

## 2018-11-12 RX ADMIN — MUPIROCIN 1 APPLICATION(S): 20 OINTMENT TOPICAL at 18:13

## 2018-11-12 RX ADMIN — BUDESONIDE AND FORMOTEROL FUMARATE DIHYDRATE 2 PUFF(S): 160; 4.5 AEROSOL RESPIRATORY (INHALATION) at 18:13

## 2018-11-12 RX ADMIN — Medication 3 MILLILITER(S): at 16:19

## 2018-11-12 RX ADMIN — HYDROMORPHONE HYDROCHLORIDE 2 MILLIGRAM(S): 2 INJECTION INTRAMUSCULAR; INTRAVENOUS; SUBCUTANEOUS at 22:45

## 2018-11-12 RX ADMIN — Medication 0: at 18:13

## 2018-11-12 RX ADMIN — FENTANYL CITRATE 1 PATCH: 50 INJECTION INTRAVENOUS at 07:00

## 2018-11-12 RX ADMIN — Medication 25 MILLIGRAM(S): at 23:55

## 2018-11-12 RX ADMIN — MIRTAZAPINE 7.5 MILLIGRAM(S): 45 TABLET, ORALLY DISINTEGRATING ORAL at 21:15

## 2018-11-12 RX ADMIN — Medication 1.5 MILLIGRAM(S): at 22:15

## 2018-11-12 RX ADMIN — LIDOCAINE 1 PATCH: 4 CREAM TOPICAL at 00:14

## 2018-11-12 RX ADMIN — FENTANYL CITRATE 1 PATCH: 50 INJECTION INTRAVENOUS at 18:10

## 2018-11-12 RX ADMIN — TIZANIDINE 4 MILLIGRAM(S): 4 TABLET ORAL at 08:23

## 2018-11-12 RX ADMIN — CEFEPIME 100 MILLIGRAM(S): 1 INJECTION, POWDER, FOR SOLUTION INTRAMUSCULAR; INTRAVENOUS at 06:22

## 2018-11-12 RX ADMIN — FENTANYL CITRATE 1 PATCH: 50 INJECTION INTRAVENOUS at 19:15

## 2018-11-12 RX ADMIN — QUETIAPINE FUMARATE 25 MILLIGRAM(S): 200 TABLET, FILM COATED ORAL at 21:15

## 2018-11-12 RX ADMIN — MINOCYCLINE HYDROCHLORIDE 100 MILLIGRAM(S): 45 TABLET, EXTENDED RELEASE ORAL at 06:24

## 2018-11-12 RX ADMIN — TIOTROPIUM BROMIDE 1 CAPSULE(S): 18 CAPSULE ORAL; RESPIRATORY (INHALATION) at 13:10

## 2018-11-12 RX ADMIN — Medication 25 MILLIGRAM(S): at 08:19

## 2018-11-12 RX ADMIN — ALBUTEROL 2 PUFF(S): 90 AEROSOL, METERED ORAL at 06:24

## 2018-11-12 RX ADMIN — ERGOCALCIFEROL 8000 UNIT(S): 1.25 CAPSULE ORAL at 18:20

## 2018-11-12 RX ADMIN — ALBUTEROL 2 PUFF(S): 90 AEROSOL, METERED ORAL at 23:56

## 2018-11-12 RX ADMIN — LIDOCAINE 1 PATCH: 4 CREAM TOPICAL at 19:54

## 2018-11-12 RX ADMIN — CLOPIDOGREL BISULFATE 75 MILLIGRAM(S): 75 TABLET, FILM COATED ORAL at 13:11

## 2018-11-12 RX ADMIN — TIZANIDINE 4 MILLIGRAM(S): 4 TABLET ORAL at 21:15

## 2018-11-12 RX ADMIN — SERTRALINE 50 MILLIGRAM(S): 25 TABLET, FILM COATED ORAL at 13:13

## 2018-11-12 RX ADMIN — GABAPENTIN 300 MILLIGRAM(S): 400 CAPSULE ORAL at 21:16

## 2018-11-12 RX ADMIN — CEFEPIME 100 MILLIGRAM(S): 1 INJECTION, POWDER, FOR SOLUTION INTRAMUSCULAR; INTRAVENOUS at 18:20

## 2018-11-12 RX ADMIN — GABAPENTIN 300 MILLIGRAM(S): 400 CAPSULE ORAL at 13:12

## 2018-11-12 RX ADMIN — LIDOCAINE 1 PATCH: 4 CREAM TOPICAL at 13:12

## 2018-11-12 RX ADMIN — ALBUTEROL 2 PUFF(S): 90 AEROSOL, METERED ORAL at 13:10

## 2018-11-12 RX ADMIN — MINOCYCLINE HYDROCHLORIDE 100 MILLIGRAM(S): 45 TABLET, EXTENDED RELEASE ORAL at 18:14

## 2018-11-12 RX ADMIN — HYDROMORPHONE HYDROCHLORIDE 2 MILLIGRAM(S): 2 INJECTION INTRAMUSCULAR; INTRAVENOUS; SUBCUTANEOUS at 22:15

## 2018-11-12 RX ADMIN — BUDESONIDE AND FORMOTEROL FUMARATE DIHYDRATE 2 PUFF(S): 160; 4.5 AEROSOL RESPIRATORY (INHALATION) at 06:24

## 2018-11-12 RX ADMIN — Medication 7.5 MILLIGRAM(S): at 06:25

## 2018-11-12 RX ADMIN — Medication 300 MILLIGRAM(S): at 13:11

## 2018-11-12 RX ADMIN — ALBUTEROL 2 PUFF(S): 90 AEROSOL, METERED ORAL at 18:13

## 2018-11-12 RX ADMIN — GABAPENTIN 300 MILLIGRAM(S): 400 CAPSULE ORAL at 06:25

## 2018-11-12 RX ADMIN — HUMAN INSULIN 6 UNIT(S): 100 INJECTION, SUSPENSION SUBCUTANEOUS at 06:32

## 2018-11-12 RX ADMIN — MUPIROCIN 1 APPLICATION(S): 20 OINTMENT TOPICAL at 06:25

## 2018-11-12 NOTE — CONSULT NOTE ADULT - ASSESSMENT
continue current rx. peg c/d/i, if felt to be aspiration can condier ir jejuostmoy through gastrostomy.  aspiration precautions.

## 2018-11-12 NOTE — PROGRESS NOTE ADULT - SUBJECTIVE AND OBJECTIVE BOX
CC: f/u for pneumonia     Patient at baseline, somnolent but arouses, follows commands, no distress    REVIEW OF SYSTEMS:  All other review of systems negative (Comprehensive ROS)- limited, d/w daughter at bedside    Antimicrobials Day #7  cefepime   IVPB 1000 milliGRAM(s) IV Intermittent every 12 hours  minocycline 100 milliGRAM(s) Oral two times a day    Other Medications Reviewed    Vital Signs Last 24 Hrs  T(F): 97.7 (11 Nov 2018 13:54), Max: 98.5 (10 Nov 2018 21:39)  HR: 90 (11 Nov 2018 13:54) (90 - 99)  BP: 126/74 (11 Nov 2018 13:54) (126/74 - 150/88)  BP(mean): --  RR: 18 (11 Nov 2018 13:54) (18 - 18)  SpO2: 97% (11 Nov 2018 13:54) (95% - 97%)    PHYSICAL EXAM:  General: no acute distress  Eyes:  anicteric, no conjunctival injection, no discharge  Oropharynx: no lesions or injection 	  Neck: supple, without adenopathy  Lungs: clear to auscultation anteriorly   Heart: regular rate and rhythm; no murmur, rubs or gallops  Abdomen: soft, nondistended, nontender, G tube clean  Blackman  Skin: faint papular rash chest fading  Extremities: R hip/thigh incision intact, except most distal aspect, very shallow ulceration- clean  Neurologic: somnolent, moves all extremities    LAB RESULTS:                          9.7    9.97  )-----------( 318      ( 12 Nov 2018 08:39 )             28.3   11-12    134<L>  |  102  |  18  ----------------------------<  128<H>  3.6   |  21<L>  |  0.37<L>    Ca    8.5      12 Nov 2018 07:11  Mg     1.5     11-12        MICROBIOLOGY:  RECENT CULTURES:  11-06 @ 07:05 .Urine Catheterized Pseudomonas aeruginosa (Carbapenem Resistant)    >100,000 CFU/ml Pseudomonas aeruginosa (Carbapenem Resistant)    11-06 @ 06:14 .Blood Blood-Peripheral     No growth to date.    RADIOLOGY REVIEWED:  Xray Chest 1 View- PORTABLE-Urgent (11.06.18 @ 03:37) >  Patchy opacities in the right upper lung field, may represent   infection. Chronic lung changes cannot be ruled out entirely.

## 2018-11-12 NOTE — CONSULT NOTE ADULT - SUBJECTIVE AND OBJECTIVE BOX
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 Per  at bedside, patient has had very little energy for the last 48 hours. She is not as responsive as her baseline and is spending most of the sleeping. He is not aware of any fevers, chills. Patient denies any cough or sputum production. Family is seeking change of rehab facility.    In the ED, patient cultures drawn, given vancomcyin, ceftriaxone and azithromax. CXR with possible infiltrate and UA positive.    Son and granddaughter at bedside. (06 Nov 2018 13:15)      PAST MEDICAL & SURGICAL HISTORY:  CVA (cerebral vascular accident)  Fatty pancreas  PNA (pneumonia)  Pulmonary HTN  IGT (impaired glucose tolerance)  Ulcerative colitis  Acid reflux  Anxiety  Depression  Mouth sores  HLD (hyperlipidemia)  Asthma  Humeral head fracture  H/O: hysterectomy  H/O cataract extraction, left  History of knee replacement      MEDICATIONS  (STANDING):  ALBUTerol    90 MICROgram(s) HFA Inhaler 2 Puff(s) Inhalation every 6 hours  buDESOnide 160 MICROgram(s)/formoterol 4.5 MICROgram(s) Inhaler 2 Puff(s) Inhalation two times a day  cefepime   IVPB 1000 milliGRAM(s) IV Intermittent every 12 hours  clonazePAM Tablet 1.5 milliGRAM(s) Oral at bedtime  clopidogrel Tablet 75 milliGRAM(s) Oral daily  dextrose 5%. 1000 milliLiter(s) (50 mL/Hr) IV Continuous <Continuous>  dextrose 50% Injectable 12.5 Gram(s) IV Push once  dextrose 50% Injectable 25 Gram(s) IV Push once  dextrose 50% Injectable 25 Gram(s) IV Push once  ergocalciferol Drops 8000 Unit(s) Oral daily  fentaNYL   Patch  12 MICROgram(s)/Hr 1 Patch Transdermal every 72 hours  ferrous    sulfate Liquid 300 milliGRAM(s) Enteral Tube daily  gabapentin 300 milliGRAM(s) Oral every 8 hours  insulin lispro (HumaLOG) corrective regimen sliding scale   SubCutaneous every 6 hours  insulin NPH human recombinant 6 Unit(s) SubCutaneous before breakfast  lidocaine   Patch 1 Patch Transdermal daily  medical marijuana oil 1 milliLiter(s) 1 milliLiter(s) SubLingual <User Schedule>  minocycline 100 milliGRAM(s) Oral two times a day  mirtazapine 7.5 milliGRAM(s) Oral at bedtime  mupirocin 2% Ointment 1 Application(s) Topical two times a day  pantoprazole   Suspension 40 milliGRAM(s) Oral before breakfast  predniSONE   Tablet 7.5 milliGRAM(s) Oral daily  QUEtiapine 25 milliGRAM(s) Oral at bedtime  sertraline 50 milliGRAM(s) Oral daily  sodium chloride 0.9%. 1000 milliLiter(s) (75 mL/Hr) IV Continuous <Continuous>  tiotropium 18 MICROgram(s) Capsule 1 Capsule(s) Inhalation daily  tiZANidine 4 milliGRAM(s) Oral <User Schedule>      Allergies    ASA; dye contrast (Anaphylaxis)  aspirin (Short breath)  divalproex sodium (Other (Unknown))  Haldol (Other (Unknown))  penicillin (Short breath; Rash)  sulfa drugs (Short breath; Rash)  Xanax (Other (Unknown))    Intolerances        SOCIAL HISTORY:  Denies ETOh,Smoking,     FAMILY HISTORY:  Family history of dementia (Grandparent)  Family history of colon cancer (Grandparent)      REVIEW OF SYSTEMS:    CONSTITUTIONAL: No weakness, fevers or chills  EYES/ENT: No visual changes;  No vertigo or throat pain   NECK: No pain or stiffness  RESPIRATORY: No cough, wheezing, hemoptysis; No shortness of breath  CARDIOVASCULAR: No chest pain or palpitations  GASTROINTESTINAL: No abdominal or epigastric pain. No nausea, vomiting, or hematemesis; No diarrhea or constipation. No melena or hematochezia.  GENITOURINARY: No dysuria, frequency or hematuria  NEUROLOGICAL: No numbness or weakness  SKIN: No itching, burning, rashes, or lesions   All other review of systems is negative unless indicated above.    VITAL:  T(C): , Max: 36.5 (11-11-18 @ 13:54)  T(F): , Max: 97.7 (11-11-18 @ 13:54)  HR: 96 (11-11-18 @ 21:18)  BP: 146/90 (11-11-18 @ 21:18)  BP(mean): --  RR: 19 (11-11-18 @ 21:18)  SpO2: 100% (11-11-18 @ 21:18)  Wt(kg): --    I and O's:    11-10 @ 07:01  -  11-11 @ 07:00  --------------------------------------------------------  IN: 3420 mL / OUT: 700 mL / NET: 2720 mL    11-11 @ 07:01  -  11-12 @ 07:00  --------------------------------------------------------  IN: 2380 mL / OUT: 1400 mL / NET: 980 mL          PHYSICAL EXAM:    Constitutional: NAD  HEENT: PERRLA,   Neck: No JVD  Respiratory: CTA B/L  Cardiovascular: S1 and S2  Gastrointestinal: BS+, soft, NT/ND  Extremities: No peripheral edema  Neurological: A/O x 3, no focal deficits  Psychiatric: Normal mood, normal affect  : No Gavin  Skin: No rashes  Access: Not applicable  Back: No CVA tenderness    LABS:                        9.7    9.97  )-----------( 318      ( 12 Nov 2018 08:39 )             28.3     11-12    134<L>  |  102  |  18  ----------------------------<  128<H>  3.6   |  21<L>  |  0.37<L>    Ca    8.5      12 Nov 2018 07:11  Mg     1.5     11-12            RADIOLOGY & ADDITIONAL STUDIES:

## 2018-11-12 NOTE — PROGRESS NOTE ADULT - ASSESSMENT
metabolic encephalopathy   dementia   uti   pneuomonia   dementia   hyperglycemia due to medication   cva

## 2018-11-12 NOTE — PROGRESS NOTE ADULT - ASSESSMENT
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  Blood cultures negative   Afebrile, normal WBC    Suggest:  Continue  per peg BID for MRSA suppression  d/c cefepime after todays dose  Follow temps and CBC/diff 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

## 2018-11-12 NOTE — PROGRESS NOTE ADULT - SUBJECTIVE AND OBJECTIVE BOX
---___---___---___---___---___---___ ---___---___---___---___---___---___---___---___---___---                    <<<  M E D I C A L   A T T E N D I N G    F O L L O W    U P   N O T E  >>>    - Patient seen and examined by me approximately thirty minutes ago.  - In summary, patient is a 68y year old Female who origianlly presented with metabolic encephalopathy   - Patient today overall doing ok, comfortable, eating OK.     ---___---___---___---___---___---      <<<  MEDICATIONS:  >>>    MEDICATIONS  (STANDING):  ALBUTerol    90 MICROgram(s) HFA Inhaler 2 Puff(s) Inhalation every 6 hours  buDESOnide 160 MICROgram(s)/formoterol 4.5 MICROgram(s) Inhaler 2 Puff(s) Inhalation two times a day  cefepime   IVPB 1000 milliGRAM(s) IV Intermittent every 12 hours  clonazePAM Tablet 1.5 milliGRAM(s) Oral at bedtime  clopidogrel Tablet 75 milliGRAM(s) Oral daily  dextrose 5%. 1000 milliLiter(s) (50 mL/Hr) IV Continuous <Continuous>  dextrose 50% Injectable 12.5 Gram(s) IV Push once  dextrose 50% Injectable 25 Gram(s) IV Push once  dextrose 50% Injectable 25 Gram(s) IV Push once  ergocalciferol Drops 8000 Unit(s) Oral daily  fentaNYL   Patch  12 MICROgram(s)/Hr 1 Patch Transdermal every 72 hours  ferrous    sulfate Liquid 300 milliGRAM(s) Enteral Tube daily  gabapentin 300 milliGRAM(s) Oral every 8 hours  insulin lispro (HumaLOG) corrective regimen sliding scale   SubCutaneous every 6 hours  insulin NPH human recombinant 6 Unit(s) SubCutaneous before breakfast  lidocaine   Patch 1 Patch Transdermal daily  medical marijuana oil 1 milliLiter(s) 1 milliLiter(s) SubLingual <User Schedule>  minocycline 100 milliGRAM(s) Oral two times a day  mirtazapine 7.5 milliGRAM(s) Oral at bedtime  mupirocin 2% Ointment 1 Application(s) Topical two times a day  pantoprazole   Suspension 40 milliGRAM(s) Oral before breakfast  predniSONE   Tablet 7.5 milliGRAM(s) Oral daily  QUEtiapine 25 milliGRAM(s) Oral at bedtime  sertraline 50 milliGRAM(s) Oral daily  sodium chloride 0.9%. 1000 milliLiter(s) (75 mL/Hr) IV Continuous <Continuous>  tiotropium 18 MICROgram(s) Capsule 1 Capsule(s) Inhalation daily  tiZANidine 4 milliGRAM(s) Oral <User Schedule>      MEDICATIONS  (PRN):  acetaminophen    Suspension .. 650 milliGRAM(s) Oral every 8 hours PRN Mild Pain (1 - 3)  dextrose 40% Gel 15 Gram(s) Oral once PRN Blood Glucose LESS THAN 70 milliGRAM(s)/deciliter  glucagon  Injectable 1 milliGRAM(s) IntraMuscular once PRN Glucose LESS THAN 70 milligrams/deciliter  HYDROmorphone   Tablet 2 milliGRAM(s) Enteral Tube every 4 hours PRN Moderate Pain (4 - 6)  LORazepam     Tablet 0.5 milliGRAM(s) Oral every 12 hours PRN Agitation  nystatin Powder 1 Application(s) Topical two times a day PRN rash/itchiness       ---___---___---___---___---___---     <<<REVIEW OF SYSTEM: >>>    GEN: no fever, no chills, no pain  RESP: no SOB, no cough, no sputum  CVS: no chest pain, no palpitations, no edema  GI: no abdominal pain, no nausea, no vomiting, no constipation, no diarrhea  : no dysurea, no frequency  NEURO: no headache, no dizziness  PSYCH: no depression, not anxious  Derm : no itching, no rash     ---___---___---___---___---___---          <<<  VITAL SIGNS: >>>    T(F): 97.7 (11-11-18 @ 21:18), Max: 97.7 (11-11-18 @ 13:54)  HR: 96 (11-11-18 @ 21:18) (90 - 96)  BP: 146/90 (11-11-18 @ 21:18) (126/74 - 146/90)  RR: 19 (11-11-18 @ 21:18) (18 - 19)  SpO2: 100% (11-11-18 @ 21:18) (97% - 100%)  Wt(kg): --  CAPILLARY BLOOD GLUCOSE      POCT Blood Glucose.: 117 mg/dL (12 Nov 2018 06:04)    I&O's Summary    11 Nov 2018 07:01  -  12 Nov 2018 07:00  --------------------------------------------------------  IN: 2380 mL / OUT: 1400 mL / NET: 980 mL         ---___---___---___---___---___---                       PHYSICAL EXAM:    GEN: A&O X 2, NAD , comfortable  HEENT: NCAT, PERRL, MMM, no scleral icterus, hearing intact  NECK: Supple, No JVD  CVS: S1S2 , regular , No M/R/G appreciated  PULM: CTA B/L,  no W/R/R appreciated  ABD.: soft. non tender, non distended,  bowel sounds present peg noted   Extrem: intact pulses , no edema noted  Derm: No rash or ecchymosis noted  PSYCH: normal mood,  depression,  anxious     ---___---___---___---___---___---     <<<  LAB AND IMAGING: >>>                          9.4    8.83  )-----------( 309      ( 11 Nov 2018 09:13 )             27.5               11-11    134<L>  |  101  |  18  ----------------------------<  130<H>  3.2<L>   |  22  |  0.37<L>    Ca    8.5      11 Nov 2018 07:45  Mg     1.7     11-11                                 [All pertinent / recent available Imaging reports and other labs reviewed]     ---___---___---___---___---___---___ ---___---___---___---___---           <<<  A S S E S S M E N T   A N D   P L A N :  >>>          -GI/DVT Prophylaxis.    --------------------------------------------  Case discussed with kendra the  he would like to take her home after this admission   Education given on     >>______________________<<      Deniz Bolden .         phone   [phone]

## 2018-11-13 LAB
ANION GAP SERPL CALC-SCNC: 12 MMOL/L — SIGNIFICANT CHANGE UP (ref 5–17)
BUN SERPL-MCNC: 19 MG/DL — SIGNIFICANT CHANGE UP (ref 7–23)
CALCIUM SERPL-MCNC: 8.4 MG/DL — SIGNIFICANT CHANGE UP (ref 8.4–10.5)
CHLORIDE SERPL-SCNC: 100 MMOL/L — SIGNIFICANT CHANGE UP (ref 96–108)
CO2 SERPL-SCNC: 20 MMOL/L — LOW (ref 22–31)
CREAT SERPL-MCNC: 0.36 MG/DL — LOW (ref 0.5–1.3)
GLUCOSE BLDC GLUCOMTR-MCNC: 106 MG/DL — HIGH (ref 70–99)
GLUCOSE BLDC GLUCOMTR-MCNC: 112 MG/DL — HIGH (ref 70–99)
GLUCOSE BLDC GLUCOMTR-MCNC: 154 MG/DL — HIGH (ref 70–99)
GLUCOSE BLDC GLUCOMTR-MCNC: 179 MG/DL — HIGH (ref 70–99)
GLUCOSE SERPL-MCNC: 150 MG/DL — HIGH (ref 70–99)
HCT VFR BLD CALC: 27.7 % — LOW (ref 34.5–45)
HGB BLD-MCNC: 9.6 G/DL — LOW (ref 11.5–15.5)
MCHC RBC-ENTMCNC: 31.3 PG — SIGNIFICANT CHANGE UP (ref 27–34)
MCHC RBC-ENTMCNC: 34.7 GM/DL — SIGNIFICANT CHANGE UP (ref 32–36)
MCV RBC AUTO: 90.2 FL — SIGNIFICANT CHANGE UP (ref 80–100)
PLATELET # BLD AUTO: 332 K/UL — SIGNIFICANT CHANGE UP (ref 150–400)
POTASSIUM SERPL-MCNC: 3.4 MMOL/L — LOW (ref 3.5–5.3)
POTASSIUM SERPL-SCNC: 3.4 MMOL/L — LOW (ref 3.5–5.3)
RBC # BLD: 3.07 M/UL — LOW (ref 3.8–5.2)
RBC # FLD: 15.1 % — HIGH (ref 10.3–14.5)
SODIUM SERPL-SCNC: 132 MMOL/L — LOW (ref 135–145)
WBC # BLD: 11.25 K/UL — HIGH (ref 3.8–10.5)
WBC # FLD AUTO: 11.25 K/UL — HIGH (ref 3.8–10.5)

## 2018-11-13 PROCEDURE — 99223 1ST HOSP IP/OBS HIGH 75: CPT

## 2018-11-13 RX ORDER — MEN-PHOR .5; .5 G/G; G/G
1 LOTION TOPICAL THREE TIMES A DAY
Qty: 0 | Refills: 0 | Status: DISCONTINUED | OUTPATIENT
Start: 2018-11-13 | End: 2018-11-15

## 2018-11-13 RX ORDER — IPRATROPIUM/ALBUTEROL SULFATE 18-103MCG
3 AEROSOL WITH ADAPTER (GRAM) INHALATION EVERY 6 HOURS
Qty: 0 | Refills: 0 | Status: DISCONTINUED | OUTPATIENT
Start: 2018-11-13 | End: 2018-11-19

## 2018-11-13 RX ORDER — HEPARIN SODIUM 5000 [USP'U]/ML
5000 INJECTION INTRAVENOUS; SUBCUTANEOUS EVERY 12 HOURS
Qty: 0 | Refills: 0 | Status: DISCONTINUED | OUTPATIENT
Start: 2018-11-13 | End: 2018-11-19

## 2018-11-13 RX ORDER — FENTANYL CITRATE 50 UG/ML
1 INJECTION INTRAVENOUS
Qty: 0 | Refills: 0 | Status: DISCONTINUED | OUTPATIENT
Start: 2018-11-13 | End: 2018-11-18

## 2018-11-13 RX ORDER — IPRATROPIUM/ALBUTEROL SULFATE 18-103MCG
3 AEROSOL WITH ADAPTER (GRAM) INHALATION ONCE
Qty: 0 | Refills: 0 | Status: COMPLETED | OUTPATIENT
Start: 2018-11-13 | End: 2018-11-13

## 2018-11-13 RX ORDER — DIPHENHYDRAMINE HCL 50 MG
25 CAPSULE ORAL EVERY 4 HOURS
Qty: 0 | Refills: 0 | Status: DISCONTINUED | OUTPATIENT
Start: 2018-11-13 | End: 2018-11-19

## 2018-11-13 RX ORDER — DIPHENHYDRAMINE HCL 50 MG
25 CAPSULE ORAL ONCE
Qty: 0 | Refills: 0 | Status: COMPLETED | OUTPATIENT
Start: 2018-11-13 | End: 2018-11-13

## 2018-11-13 RX ORDER — POTASSIUM CHLORIDE 20 MEQ
20 PACKET (EA) ORAL ONCE
Qty: 0 | Refills: 0 | Status: COMPLETED | OUTPATIENT
Start: 2018-11-13 | End: 2018-11-13

## 2018-11-13 RX ADMIN — GABAPENTIN 300 MILLIGRAM(S): 400 CAPSULE ORAL at 14:59

## 2018-11-13 RX ADMIN — ALBUTEROL 2 PUFF(S): 90 AEROSOL, METERED ORAL at 05:43

## 2018-11-13 RX ADMIN — Medication 3 MILLILITER(S): at 12:39

## 2018-11-13 RX ADMIN — BUDESONIDE AND FORMOTEROL FUMARATE DIHYDRATE 2 PUFF(S): 160; 4.5 AEROSOL RESPIRATORY (INHALATION) at 05:43

## 2018-11-13 RX ADMIN — CLOPIDOGREL BISULFATE 75 MILLIGRAM(S): 75 TABLET, FILM COATED ORAL at 11:13

## 2018-11-13 RX ADMIN — Medication 20 MILLIEQUIVALENT(S): at 11:07

## 2018-11-13 RX ADMIN — BUDESONIDE AND FORMOTEROL FUMARATE DIHYDRATE 2 PUFF(S): 160; 4.5 AEROSOL RESPIRATORY (INHALATION) at 17:25

## 2018-11-13 RX ADMIN — TIOTROPIUM BROMIDE 1 CAPSULE(S): 18 CAPSULE ORAL; RESPIRATORY (INHALATION) at 11:12

## 2018-11-13 RX ADMIN — HUMAN INSULIN 6 UNIT(S): 100 INJECTION, SUSPENSION SUBCUTANEOUS at 06:37

## 2018-11-13 RX ADMIN — SERTRALINE 50 MILLIGRAM(S): 25 TABLET, FILM COATED ORAL at 11:12

## 2018-11-13 RX ADMIN — MINOCYCLINE HYDROCHLORIDE 100 MILLIGRAM(S): 45 TABLET, EXTENDED RELEASE ORAL at 05:44

## 2018-11-13 RX ADMIN — MIRTAZAPINE 7.5 MILLIGRAM(S): 45 TABLET, ORALLY DISINTEGRATING ORAL at 21:29

## 2018-11-13 RX ADMIN — MUPIROCIN 1 APPLICATION(S): 20 OINTMENT TOPICAL at 05:44

## 2018-11-13 RX ADMIN — CHLORHEXIDINE GLUCONATE 1 APPLICATION(S): 213 SOLUTION TOPICAL at 11:12

## 2018-11-13 RX ADMIN — Medication 1: at 12:38

## 2018-11-13 RX ADMIN — GABAPENTIN 300 MILLIGRAM(S): 400 CAPSULE ORAL at 05:44

## 2018-11-13 RX ADMIN — GABAPENTIN 300 MILLIGRAM(S): 400 CAPSULE ORAL at 21:30

## 2018-11-13 RX ADMIN — MINOCYCLINE HYDROCHLORIDE 100 MILLIGRAM(S): 45 TABLET, EXTENDED RELEASE ORAL at 17:26

## 2018-11-13 RX ADMIN — SODIUM CHLORIDE 75 MILLILITER(S): 9 INJECTION INTRAMUSCULAR; INTRAVENOUS; SUBCUTANEOUS at 05:42

## 2018-11-13 RX ADMIN — Medication 25 MILLIGRAM(S): at 05:42

## 2018-11-13 RX ADMIN — Medication 25 MILLIGRAM(S): at 12:43

## 2018-11-13 RX ADMIN — HEPARIN SODIUM 5000 UNIT(S): 5000 INJECTION INTRAVENOUS; SUBCUTANEOUS at 18:06

## 2018-11-13 RX ADMIN — MUPIROCIN 1 APPLICATION(S): 20 OINTMENT TOPICAL at 17:26

## 2018-11-13 RX ADMIN — TIZANIDINE 4 MILLIGRAM(S): 4 TABLET ORAL at 11:06

## 2018-11-13 RX ADMIN — LIDOCAINE 1 PATCH: 4 CREAM TOPICAL at 00:18

## 2018-11-13 RX ADMIN — ERGOCALCIFEROL 8000 UNIT(S): 1.25 CAPSULE ORAL at 18:04

## 2018-11-13 RX ADMIN — FENTANYL CITRATE 1 PATCH: 50 INJECTION INTRAVENOUS at 07:00

## 2018-11-13 RX ADMIN — QUETIAPINE FUMARATE 25 MILLIGRAM(S): 200 TABLET, FILM COATED ORAL at 21:29

## 2018-11-13 RX ADMIN — PANTOPRAZOLE SODIUM 40 MILLIGRAM(S): 20 TABLET, DELAYED RELEASE ORAL at 06:36

## 2018-11-13 RX ADMIN — TIZANIDINE 4 MILLIGRAM(S): 4 TABLET ORAL at 21:30

## 2018-11-13 RX ADMIN — Medication 300 MILLIGRAM(S): at 11:13

## 2018-11-13 RX ADMIN — Medication 7.5 MILLIGRAM(S): at 05:45

## 2018-11-13 RX ADMIN — LIDOCAINE 1 PATCH: 4 CREAM TOPICAL at 11:14

## 2018-11-13 RX ADMIN — Medication 1: at 06:37

## 2018-11-13 RX ADMIN — CEFEPIME 100 MILLIGRAM(S): 1 INJECTION, POWDER, FOR SOLUTION INTRAMUSCULAR; INTRAVENOUS at 05:42

## 2018-11-13 RX ADMIN — Medication 1.5 MILLIGRAM(S): at 21:28

## 2018-11-13 NOTE — CHART NOTE - NSCHARTNOTEFT_GEN_A_CORE
Nutrition Follow Up Note    Patient seen for: Verbal consult received from NP for bolus recommendation. Per chart, 67 y/o female with advanced Alzheimer's dementia admitted with metabolic encephalopathy. Per RN, pt was tolerating Vital AF 1.2 @ goal rate 45cc/hr x 24hrs without GI distress. Enteral feed off at time of visit for PT assessment. Per GI assessment, pt with aspiration precaution and j-tube conversion with signs of aspiration.    Source: Medical records, pt discussed during multidisciplinary round today, RN, NP    Diet : NPO with enteral feed     Patient reports: no GI distress, +BM      Enteral /Parenteral Nutrition: Vital 1.2AF @ 45cc/hr x24 hrs. Provides: 1296kcal (27kcal/kg), 81g PRO (1.7g/kg) and 1080mL per day based on current weight of 47.8kg.      Daily Weight in k.8 (-), Weight in k.1 ()  % Weight Change    Pertinent Medications: MEDICATIONS  (STANDING):  buDESOnide 160 MICROgram(s)/formoterol 4.5 MICROgram(s) Inhaler 2 Puff(s) Inhalation two times a day  chlorhexidine 4% Liquid 1 Application(s) Topical <User Schedule>  clonazePAM Tablet 1.5 milliGRAM(s) Oral at bedtime  clopidogrel Tablet 75 milliGRAM(s) Oral daily  dextrose 5%. 1000 milliLiter(s) (50 mL/Hr) IV Continuous <Continuous>  dextrose 50% Injectable 12.5 Gram(s) IV Push once  dextrose 50% Injectable 25 Gram(s) IV Push once  dextrose 50% Injectable 25 Gram(s) IV Push once  ergocalciferol Drops 8000 Unit(s) Oral daily  fentaNYL   Patch  12 MICROgram(s)/Hr 1 Patch Transdermal every 72 hours  ferrous    sulfate Liquid 300 milliGRAM(s) Enteral Tube daily  gabapentin 300 milliGRAM(s) Oral every 8 hours  heparin  Injectable 5000 Unit(s) SubCutaneous every 12 hours  insulin lispro (HumaLOG) corrective regimen sliding scale   SubCutaneous every 6 hours  insulin NPH human recombinant 6 Unit(s) SubCutaneous before breakfast  lidocaine   Patch 1 Patch Transdermal daily  medical marijuana oil 1 milliLiter(s) 1 milliLiter(s) SubLingual <User Schedule>  minocycline 100 milliGRAM(s) Oral two times a day  mirtazapine 7.5 milliGRAM(s) Oral at bedtime  mupirocin 2% Ointment 1 Application(s) Topical two times a day  pantoprazole   Suspension 40 milliGRAM(s) Oral before breakfast  predniSONE   Tablet 7.5 milliGRAM(s) Oral daily  QUEtiapine 25 milliGRAM(s) Oral at bedtime  sertraline 50 milliGRAM(s) Oral daily  sodium chloride 0.9%. 1000 milliLiter(s) (75 mL/Hr) IV Continuous <Continuous>  tiotropium 18 MICROgram(s) Capsule 1 Capsule(s) Inhalation daily  tiZANidine 4 milliGRAM(s) Oral <User Schedule>    MEDICATIONS  (PRN):  acetaminophen    Suspension .. 650 milliGRAM(s) Oral every 8 hours PRN Mild Pain (1 - 3)  ALBUTerol/ipratropium for Nebulization 3 milliLiter(s) Nebulizer every 6 hours PRN Shortness of Breath and/or Wheezing  camphor 0.5%/menthol 0.5% Topical Lotion 1 Application(s) Topical three times a day PRN itching  dextrose 40% Gel 15 Gram(s) Oral once PRN Blood Glucose LESS THAN 70 milliGRAM(s)/deciliter  diphenhydrAMINE   Elixir 25 milliGRAM(s) Enteral Tube every 4 hours PRN Itching  glucagon  Injectable 1 milliGRAM(s) IntraMuscular once PRN Glucose LESS THAN 70 milligrams/deciliter  HYDROmorphone   Tablet 2 milliGRAM(s) Enteral Tube every 4 hours PRN Moderate Pain (4 - 6)  LORazepam     Tablet 0.5 milliGRAM(s) Oral every 12 hours PRN Agitation  nystatin Powder 1 Application(s) Topical two times a day PRN rash/itchiness     @ 06:51: Na 132<L>, BUN 19, Cr 0.36<L>, <H>, K+ 3.4<L>, Phos --, Mg --, Alk Phos --, ALT/SGPT --, AST/SGOT --, HbA1c --    Finger Sticks:  POCT Blood Glucose.: 179 mg/dL ( @ 12:06)  POCT Blood Glucose.: 154 mg/dL ( @ 06:14)  POCT Blood Glucose.: 116 mg/dL ( @ 23:43)  POCT Blood Glucose.: 96 mg/dL ( @ 18:01)      Skin per nursing documentation: sacral stage III (1cm x 4cm x 0.2cm), L/R heel stage I, left malleolus stage I, right foot stage I  Edema: +3 bilateral feet edema    Estimated Needs:   [x ] no change since previous assessment  [ ] recalculated:     Previous Nutrition Diagnosis: increased nutrient needs  Nutrition Diagnosis is: being addressed with current enteral nutrition    New Nutrition Diagnosis: n/a    Recommend  1) Recommend Vital AF 1.2 100cc bolus feed every 4 hours (7am, 11am, 3pm, 7pm) advancing by 50cc every other feed to goal bolus volume of 240cc every 4 hours (Total 4 cans a day) according to the time schedule above or as tolerated. Provides 1152kcal and 72g protein (24kcal/kg and 1.5g/kg protein based on current wt of 47.8kg)     Monitoring and Evaluation:     Continue to monitor Nutritional intake, Tolerance to diet prescription, weights, labs, skin integrity    RD remains available upon request and will follow up per protocol

## 2018-11-13 NOTE — PROGRESS NOTE ADULT - ASSESSMENT
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  Blood cultures negative   Afebrile, leukocytosis is mild    Suggest:  Continue  per peg BID for MRSA suppression R hip/spacer  cefepime d/cd  Follow temps and WBC trend  d/w pt's daughter at bedside 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

## 2018-11-13 NOTE — PROGRESS NOTE ADULT - ASSESSMENT
encephalopathy   cre in urine   uti   pneumonia   dementia   anxiety     chronic pain using  medical marijuana    rash may be caused by hibicleanse is there another option. consider dc or use q 48hrs  will add benadryl to help relieve itching

## 2018-11-13 NOTE — PROGRESS NOTE ADULT - SUBJECTIVE AND OBJECTIVE BOX
---___---___---___---___---___---___ ---___---___---___---___---___---___---___---___---___---                    <<<  M E D I C A L   A T T E N D I N G    F O L L O W    U P   N O T E  >>>    - Patient seen and examined by me approximately thirty minutes ago.  - In summary, patient is a 68y year old Female who origianlly presented with acute encephalopathy   - Patient today overall doing well    ---___---___---___---___---___---      <<<  MEDICATIONS:  >>>    MEDICATIONS  (STANDING):  ALBUTerol    90 MICROgram(s) HFA Inhaler 2 Puff(s) Inhalation every 6 hours  buDESOnide 160 MICROgram(s)/formoterol 4.5 MICROgram(s) Inhaler 2 Puff(s) Inhalation two times a day  cefepime   IVPB 1000 milliGRAM(s) IV Intermittent every 12 hours  chlorhexidine 4% Liquid 1 Application(s) Topical <User Schedule>  clonazePAM Tablet 1.5 milliGRAM(s) Oral at bedtime  clopidogrel Tablet 75 milliGRAM(s) Oral daily  dextrose 5%. 1000 milliLiter(s) (50 mL/Hr) IV Continuous <Continuous>  dextrose 50% Injectable 12.5 Gram(s) IV Push once  dextrose 50% Injectable 25 Gram(s) IV Push once  dextrose 50% Injectable 25 Gram(s) IV Push once  ergocalciferol Drops 8000 Unit(s) Oral daily  ferrous    sulfate Liquid 300 milliGRAM(s) Enteral Tube daily  gabapentin 300 milliGRAM(s) Oral every 8 hours  insulin lispro (HumaLOG) corrective regimen sliding scale   SubCutaneous every 6 hours  insulin NPH human recombinant 6 Unit(s) SubCutaneous before breakfast  lidocaine   Patch 1 Patch Transdermal daily  medical marijuana oil 1 milliLiter(s) 1 milliLiter(s) SubLingual <User Schedule>  minocycline 100 milliGRAM(s) Oral two times a day  mirtazapine 7.5 milliGRAM(s) Oral at bedtime  mupirocin 2% Ointment 1 Application(s) Topical two times a day  pantoprazole   Suspension 40 milliGRAM(s) Oral before breakfast  predniSONE   Tablet 7.5 milliGRAM(s) Oral daily  QUEtiapine 25 milliGRAM(s) Oral at bedtime  sertraline 50 milliGRAM(s) Oral daily  sodium chloride 0.9%. 1000 milliLiter(s) (75 mL/Hr) IV Continuous <Continuous>  tiotropium 18 MICROgram(s) Capsule 1 Capsule(s) Inhalation daily  tiZANidine 4 milliGRAM(s) Oral <User Schedule>      MEDICATIONS  (PRN):  acetaminophen    Suspension .. 650 milliGRAM(s) Oral every 8 hours PRN Mild Pain (1 - 3)  dextrose 40% Gel 15 Gram(s) Oral once PRN Blood Glucose LESS THAN 70 milliGRAM(s)/deciliter  glucagon  Injectable 1 milliGRAM(s) IntraMuscular once PRN Glucose LESS THAN 70 milligrams/deciliter  HYDROmorphone   Tablet 2 milliGRAM(s) Enteral Tube every 4 hours PRN Moderate Pain (4 - 6)  LORazepam     Tablet 0.5 milliGRAM(s) Oral every 12 hours PRN Agitation  nystatin Powder 1 Application(s) Topical two times a day PRN rash/itchiness       ---___---___---___---___---___---     <<<REVIEW OF SYSTEM: >>>    GEN: no fever, no chills, no pain  RESP: no SOB, no cough, no sputum  CVS: no chest pain, no palpitations, no edema  GI: no abdominal pain, no nausea, no vomiting, no constipation, no diarrhea  : no dysurea, no frequency  NEURO: no headache, no dizziness  PSYCH: no depression, not anxious  Derm : no itching, no rash     ---___---___---___---___---___---          <<<  VITAL SIGNS: >>>    T(F): 98.6 (11-13-18 @ 05:25), Max: 100.1 (11-12-18 @ 22:05)  HR: 92 (11-13-18 @ 05:25) (73 - 92)  BP: 118/70 (11-13-18 @ 05:25) (118/70 - 168/95)  RR: 19 (11-13-18 @ 05:25) (18 - 22)  SpO2: 95% (11-13-18 @ 05:25) (95% - 98%)  Wt(kg): --  CAPILLARY BLOOD GLUCOSE      POCT Blood Glucose.: 154 mg/dL (13 Nov 2018 06:14)    I&O's Summary    12 Nov 2018 07:01  -  13 Nov 2018 07:00  --------------------------------------------------------  IN: 3280 mL / OUT: 750 mL / NET: 2530 mL         ---___---___---___---___---___---                       PHYSICAL EXAM:    GEN: A&O X 2 , NAD , comfortable  HEENT: NCAT, PERRL, MMM, no scleral icterus, hearing intact  NECK: Supple, No JVD  CVS: S1S2 , regular , No M/R/G appreciated  PULM: CTA B/L,  no W/R/R appreciated  ABD.: soft. non tender, non distended,  bowel sounds present (+) peg noted   Extrem: intact pulses , no edema noted  Derm:  rash  noted red and pruitic   PSYCH: normal mood, no depression, not anxious     ---___---___---___---___---___---     <<<  LAB AND IMAGING: >>>                          9.7    9.97  )-----------( 318      ( 12 Nov 2018 08:39 )             28.3               11-12    134<L>  |  102  |  18  ----------------------------<  128<H>  3.6   |  21<L>  |  0.37<L>    Ca    8.5      12 Nov 2018 07:11  Mg     1.5     11-12                                 [All pertinent / recent available Imaging reports and other labs reviewed]     ---___---___---___---___---___---___ ---___---___---___---___---           <<<  A S S E S S M E N T   A N D   P L A N :  >>>          -GI/DVT Prophylaxis.    --------------------------------------------  Case discussed with   Education given on     >>______________________<<      Deniz Bodlen .         phone   [phone]

## 2018-11-13 NOTE — PROGRESS NOTE ADULT - SUBJECTIVE AND OBJECTIVE BOX
INTERVAL HPI/OVERNIGHT EVENTS: tolerating feeds, discussed with daughter at bedside, no abd pain or vomiting    MEDICATIONS  (STANDING):  ALBUTerol    90 MICROgram(s) HFA Inhaler 2 Puff(s) Inhalation every 6 hours  buDESOnide 160 MICROgram(s)/formoterol 4.5 MICROgram(s) Inhaler 2 Puff(s) Inhalation two times a day  cefepime   IVPB 1000 milliGRAM(s) IV Intermittent every 12 hours  chlorhexidine 4% Liquid 1 Application(s) Topical <User Schedule>  clonazePAM Tablet 1.5 milliGRAM(s) Oral at bedtime  clopidogrel Tablet 75 milliGRAM(s) Oral daily  dextrose 5%. 1000 milliLiter(s) (50 mL/Hr) IV Continuous <Continuous>  dextrose 50% Injectable 12.5 Gram(s) IV Push once  dextrose 50% Injectable 25 Gram(s) IV Push once  dextrose 50% Injectable 25 Gram(s) IV Push once  ergocalciferol Drops 8000 Unit(s) Oral daily  ferrous    sulfate Liquid 300 milliGRAM(s) Enteral Tube daily  gabapentin 300 milliGRAM(s) Oral every 8 hours  heparin  Injectable 5000 Unit(s) SubCutaneous every 12 hours  insulin lispro (HumaLOG) corrective regimen sliding scale   SubCutaneous every 6 hours  insulin NPH human recombinant 6 Unit(s) SubCutaneous before breakfast  lidocaine   Patch 1 Patch Transdermal daily  medical marijuana oil 1 milliLiter(s) 1 milliLiter(s) SubLingual <User Schedule>  minocycline 100 milliGRAM(s) Oral two times a day  mirtazapine 7.5 milliGRAM(s) Oral at bedtime  mupirocin 2% Ointment 1 Application(s) Topical two times a day  pantoprazole   Suspension 40 milliGRAM(s) Oral before breakfast  predniSONE   Tablet 7.5 milliGRAM(s) Oral daily  QUEtiapine 25 milliGRAM(s) Oral at bedtime  sertraline 50 milliGRAM(s) Oral daily  sodium chloride 0.9%. 1000 milliLiter(s) (75 mL/Hr) IV Continuous <Continuous>  tiotropium 18 MICROgram(s) Capsule 1 Capsule(s) Inhalation daily  tiZANidine 4 milliGRAM(s) Oral <User Schedule>    MEDICATIONS  (PRN):  acetaminophen    Suspension .. 650 milliGRAM(s) Oral every 8 hours PRN Mild Pain (1 - 3)  dextrose 40% Gel 15 Gram(s) Oral once PRN Blood Glucose LESS THAN 70 milliGRAM(s)/deciliter  diphenhydrAMINE   Elixir 25 milliGRAM(s) Enteral Tube every 4 hours PRN Itching  glucagon  Injectable 1 milliGRAM(s) IntraMuscular once PRN Glucose LESS THAN 70 milligrams/deciliter  HYDROmorphone   Tablet 2 milliGRAM(s) Enteral Tube every 4 hours PRN Moderate Pain (4 - 6)  LORazepam     Tablet 0.5 milliGRAM(s) Oral every 12 hours PRN Agitation  nystatin Powder 1 Application(s) Topical two times a day PRN rash/itchiness      Allergies    ASA; dye contrast (Anaphylaxis)  aspirin (Short breath)  divalproex sodium (Other (Unknown))  Haldol (Other (Unknown))  penicillin (Short breath; Rash)  sulfa drugs (Short breath; Rash)  Xanax (Other (Unknown))    Intolerances            PHYSICAL EXAM:   Vital Signs:  Vital Signs Last 24 Hrs  T(C): 37 (13 Nov 2018 05:25), Max: 37.8 (12 Nov 2018 22:05)  T(F): 98.6 (13 Nov 2018 05:25), Max: 100.1 (12 Nov 2018 22:05)  HR: 92 (13 Nov 2018 05:25) (73 - 92)  BP: 118/70 (13 Nov 2018 05:25) (118/70 - 168/95)  BP(mean): --  RR: 19 (13 Nov 2018 05:25) (18 - 22)  SpO2: 95% (13 Nov 2018 05:25) (95% - 98%)  Daily     Daily     GENERAL:  no distress  HEENT:  NC/AT,  anicteric  CHEST:   no increased effort, breath sounds clear  HEART:  Regular rhythm  ABDOMEN:  Soft, non-tender, non-distended, normoactive bowel sounds,  no masses ,no hepato-splenomegaly, no signs of chronic liver disease  EXTEREMITIES:  no cyanosis      LABS:                        9.6    11.25 )-----------( 332      ( 13 Nov 2018 07:51 )             27.7     11-13    132<L>  |  100  |  19  ----------------------------<  150<H>  3.4<L>   |  20<L>  |  0.36<L>    Ca    8.4      13 Nov 2018 06:51  Mg     1.5     11-12            RADIOLOGY & ADDITIONAL TESTS:

## 2018-11-13 NOTE — PROGRESS NOTE ADULT - ASSESSMENT
continue with PEG feeds, site clean possible conversion to J-tube if vomits or has signs of aspiration, follow residuals

## 2018-11-13 NOTE — PROGRESS NOTE ADULT - SUBJECTIVE AND OBJECTIVE BOX
CC: f/u for pneumonia     Patient at baseline, somnolent but arouses, follows commands, no distress  low grade fevers    REVIEW OF SYSTEMS:  All other review of systems negative (Comprehensive ROS)- limited, d/w daughter at bedside    Antimicrobials Day #8    minocycline 100 milliGRAM(s) Oral two times a day    Other Medications Reviewed    Vital Signs Last 24 Hrs  T(C): 36.8 (13 Nov 2018 12:12), Max: 37.8 (12 Nov 2018 22:05)  T(F): 98.3 (13 Nov 2018 12:12), Max: 100.1 (12 Nov 2018 22:05)  HR: 86 (13 Nov 2018 12:12) (73 - 92)  BP: 106/64 (13 Nov 2018 12:12) (106/64 - 168/95)  BP(mean): --  RR: 19 (13 Nov 2018 12:12) (18 - 22)  SpO2: 97% (13 Nov 2018 12:12) (95% - 98%)    PHYSICAL EXAM:  General: no acute distress  Eyes:  anicteric, no conjunctival injection, no discharge  Oropharynx: no lesions or injection 	  Neck: supple, without adenopathy  Lungs: clear to auscultation anteriorly   Heart: regular rate and rhythm; no murmur, rubs or gallops  Abdomen: soft, nondistended, nontender, G tube clean  Blackman  Skin: faint papular rash chest fading  Extremities: R hip/thigh incision intact, except most distal aspect, very shallow ulceration- clean  Neurologic: somnolent, moves all extremities    LAB RESULTS:                        9.6    11.25 )-----------( 332      ( 13 Nov 2018 07:51 )             27.7   11-13    132<L>  |  100  |  19  ----------------------------<  150<H>  3.4<L>   |  20<L>  |  0.36<L>    Ca    8.4      13 Nov 2018 06:51  Mg     1.5     11-12          MICROBIOLOGY:  RECENT CULTURES:  11-06 @ 07:05 .Urine Catheterized Pseudomonas aeruginosa (Carbapenem Resistant)    >100,000 CFU/ml Pseudomonas aeruginosa (Carbapenem Resistant)    11-06 @ 06:14 .Blood Blood-Peripheral     No growth to date.    RADIOLOGY REVIEWED:  Xray Chest 1 View- PORTABLE-Urgent (11.06.18 @ 03:37) >  Patchy opacities in the right upper lung field, may represent   infection. Chronic lung changes cannot be ruled out entirely. CC: f/u for pneumonia     Patient at baseline, somnolent but arouses, follows commands, no distress  low grade fevers    REVIEW OF SYSTEMS:  All other review of systems negative (Comprehensive ROS)- limited, d/w daughter at bedside    Antimicrobials Day #    minocycline 100 milliGRAM(s) Oral two times a day    Other Medications Reviewed    Vital Signs Last 24 Hrs  T(C): 36.8 (13 Nov 2018 12:12), Max: 37.8 (12 Nov 2018 22:05)  T(F): 98.3 (13 Nov 2018 12:12), Max: 100.1 (12 Nov 2018 22:05)  HR: 86 (13 Nov 2018 12:12) (73 - 92)  BP: 106/64 (13 Nov 2018 12:12) (106/64 - 168/95)  BP(mean): --  RR: 19 (13 Nov 2018 12:12) (18 - 22)  SpO2: 97% (13 Nov 2018 12:12) (95% - 98%)    PHYSICAL EXAM:  General: no acute distress  Eyes:  anicteric, no conjunctival injection, no discharge  Oropharynx: no lesions or injection 	  Neck: supple, without adenopathy  Lungs: clear to auscultation anteriorly   Heart: regular rate and rhythm; no murmur, rubs or gallops  Abdomen: soft, nondistended, nontender, G tube clean  Blackman  Skin: faint papular rash chest fading  Extremities: R hip/thigh incision intact, except most distal aspect, very shallow ulceration- clean  Neurologic: somnolent, moves all extremities    LAB RESULTS:                        9.6    11.25 )-----------( 332      ( 13 Nov 2018 07:51 )             27.7   11-13    132<L>  |  100  |  19  ----------------------------<  150<H>  3.4<L>   |  20<L>  |  0.36<L>    Ca    8.4      13 Nov 2018 06:51  Mg     1.5     11-12          MICROBIOLOGY:  RECENT CULTURES:  11-06 @ 07:05 .Urine Catheterized Pseudomonas aeruginosa (Carbapenem Resistant)    >100,000 CFU/ml Pseudomonas aeruginosa (Carbapenem Resistant)    11-06 @ 06:14 .Blood Blood-Peripheral     No growth to date.    RADIOLOGY REVIEWED:  Xray Chest 1 View- PORTABLE-Urgent (11.06.18 @ 03:37) >  Patchy opacities in the right upper lung field, may represent   infection. Chronic lung changes cannot be ruled out entirely.

## 2018-11-13 NOTE — PROGRESS NOTE ADULT - SUBJECTIVE AND OBJECTIVE BOX
PULMONARY PROGRESS NOTE    MYRIAM HEATH  MRN-[id number]    Patient is a 68y old  Female who presents with a chief complaint of lethargy (13 Nov 2018 08:56)      HPI:  -patient well know to myself and ,  called last night requesting a pulmonary consult for dyspnea.  Patient seen and examined lying in bed comfortably today, daughter at bedside.  She describes random episodes of strange breathing, slow deep breaths and saying she is short of breath, sometimes associated with wheezing.  Currently being treated for pneumonia.  Today is doing better.  History of PE many years ago.  Had non productive cough last night.    ROS:   -no N/V    ACTIVE MEDICATION LIST:  MEDICATIONS  (STANDING):  ALBUTerol/ipratropium for Nebulization. 3 milliLiter(s) Nebulizer once  buDESOnide 160 MICROgram(s)/formoterol 4.5 MICROgram(s) Inhaler 2 Puff(s) Inhalation two times a day  chlorhexidine 4% Liquid 1 Application(s) Topical <User Schedule>  clonazePAM Tablet 1.5 milliGRAM(s) Oral at bedtime  clopidogrel Tablet 75 milliGRAM(s) Oral daily  dextrose 5%. 1000 milliLiter(s) (50 mL/Hr) IV Continuous <Continuous>  dextrose 50% Injectable 12.5 Gram(s) IV Push once  dextrose 50% Injectable 25 Gram(s) IV Push once  dextrose 50% Injectable 25 Gram(s) IV Push once  ergocalciferol Drops 8000 Unit(s) Oral daily  fentaNYL   Patch  12 MICROgram(s)/Hr 1 Patch Transdermal every 72 hours  ferrous    sulfate Liquid 300 milliGRAM(s) Enteral Tube daily  gabapentin 300 milliGRAM(s) Oral every 8 hours  heparin  Injectable 5000 Unit(s) SubCutaneous every 12 hours  insulin lispro (HumaLOG) corrective regimen sliding scale   SubCutaneous every 6 hours  insulin NPH human recombinant 6 Unit(s) SubCutaneous before breakfast  lidocaine   Patch 1 Patch Transdermal daily  medical marijuana oil 1 milliLiter(s) 1 milliLiter(s) SubLingual <User Schedule>  minocycline 100 milliGRAM(s) Oral two times a day  mirtazapine 7.5 milliGRAM(s) Oral at bedtime  mupirocin 2% Ointment 1 Application(s) Topical two times a day  pantoprazole   Suspension 40 milliGRAM(s) Oral before breakfast  predniSONE   Tablet 7.5 milliGRAM(s) Oral daily  QUEtiapine 25 milliGRAM(s) Oral at bedtime  sertraline 50 milliGRAM(s) Oral daily  sodium chloride 0.9%. 1000 milliLiter(s) (75 mL/Hr) IV Continuous <Continuous>  tiotropium 18 MICROgram(s) Capsule 1 Capsule(s) Inhalation daily  tiZANidine 4 milliGRAM(s) Oral <User Schedule>    MEDICATIONS  (PRN):  acetaminophen    Suspension .. 650 milliGRAM(s) Oral every 8 hours PRN Mild Pain (1 - 3)  ALBUTerol/ipratropium for Nebulization 3 milliLiter(s) Nebulizer every 6 hours PRN Shortness of Breath and/or Wheezing  camphor 0.5%/menthol 0.5% Topical Lotion 1 Application(s) Topical three times a day PRN itching  dextrose 40% Gel 15 Gram(s) Oral once PRN Blood Glucose LESS THAN 70 milliGRAM(s)/deciliter  diphenhydrAMINE   Elixir 25 milliGRAM(s) Enteral Tube every 4 hours PRN Itching  glucagon  Injectable 1 milliGRAM(s) IntraMuscular once PRN Glucose LESS THAN 70 milligrams/deciliter  HYDROmorphone   Tablet 2 milliGRAM(s) Enteral Tube every 4 hours PRN Moderate Pain (4 - 6)  LORazepam     Tablet 0.5 milliGRAM(s) Oral every 12 hours PRN Agitation  nystatin Powder 1 Application(s) Topical two times a day PRN rash/itchiness      EXAM:  Vital Signs Last 24 Hrs  T(C): 37 (13 Nov 2018 05:25), Max: 37.8 (12 Nov 2018 22:05)  T(F): 98.6 (13 Nov 2018 05:25), Max: 100.1 (12 Nov 2018 22:05)  HR: 92 (13 Nov 2018 05:25) (73 - 92)  BP: 118/70 (13 Nov 2018 05:25) (118/70 - 168/95)  BP(mean): --  RR: 19 (13 Nov 2018 05:25) (18 - 22)  SpO2: 95% (13 Nov 2018 05:25) (95% - 98%)    GENERAL: The patient is awake and alert in no apparent distress.     LUNGS: Clear to auscultation without wheezing, rales or rhonchi; respirations unlabored    HEART:S1/S2    LABS/IMAGING: reviewed                        9.6    11.25 )-----------( 332      ( 13 Nov 2018 07:51 )             27.7   11-13    132<L>  |  100  |  19  ----------------------------<  150<H>  3.4<L>   |  20<L>  |  0.36<L>    Ca    8.4      13 Nov 2018 06:51  Mg     1.5     11-12    < from: Xray Chest 1 View- PORTABLE-Urgent (11.06.18 @ 03:37) >  IMPRESSION: Patchy opacities in the right upper lung field, may represent   infection. Chronic lung changes cannot be ruled out entirely.    < end of copied text >      PROBLEM LIST:  68y Female with HEALTH ISSUES - PROBLEM Dx:  IGT (impaired glucose tolerance): IGT (impaired glucose tolerance)  PNA (pneumonia): PNA (pneumonia)  CVA (cerebral vascular accident): CVA (cerebral vascular accident)  Hyponatremia: Hyponatremia  Cerebrovascular accident (CVA), unspecified mechanism: Cerebrovascular accident (CVA), unspecified mechanism  Mild asthma without complication, unspecified whether persistent: Mild asthma without complication, unspecified whether persistent  Cystitis: Cystitis  Encephalopathy acute: Encephalopathy acute  Acute hip pain, right: Acute hip pain, right  Other ulcerative colitis without complication: Other ulcerative colitis without complication  Depression: Depression  Anxiety: Anxiety  Encephalopathy, metabolic: Encephalopathy, metabolic  Altered mental state: Altered mental state  Pulmonary HTN: Pulmonary HTN  Aspiration pneumonia of left lower lobe, unspecified aspiration pneumonia type: Aspiration pneumonia of left lower lobe, unspecified aspiration pneumonia type        RECS:  -asthma: does not appear to be in exacerbation at this time, continue symbicort, spiriva, duoneb, daily prednisone  -abx per ID for pneumonia  -would repeat cxr now  -suspicion for PE not high at this time given episode nature of dyspnea and normal O2 sat, if dyspnea worsens could check ddimer and/or consider CTA given prolonged hospital/rehab course/ortho surgery etc.   -pain control        Luciana Mancini MD   [phone]

## 2018-11-13 NOTE — CHART NOTE - NSCHARTNOTEFT_GEN_A_CORE
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 This is necessary to achieve positioning and elevation not able to obtain in an ordinary bed. Bed pillows and wedges have been tried and ruled out. Overlay will help prevent pressure ulcers.   Pt will require pablo lift to transfer from bed to chair and vice versa.

## 2018-11-14 LAB
ANION GAP SERPL CALC-SCNC: 10 MMOL/L — SIGNIFICANT CHANGE UP (ref 5–17)
BUN SERPL-MCNC: 14 MG/DL — SIGNIFICANT CHANGE UP (ref 7–23)
CALCIUM SERPL-MCNC: 6.9 MG/DL — LOW (ref 8.4–10.5)
CHLORIDE SERPL-SCNC: 109 MMOL/L — HIGH (ref 96–108)
CO2 SERPL-SCNC: 19 MMOL/L — LOW (ref 22–31)
CREAT SERPL-MCNC: 0.36 MG/DL — LOW (ref 0.5–1.3)
GLUCOSE BLDC GLUCOMTR-MCNC: 102 MG/DL — HIGH (ref 70–99)
GLUCOSE BLDC GLUCOMTR-MCNC: 109 MG/DL — HIGH (ref 70–99)
GLUCOSE BLDC GLUCOMTR-MCNC: 142 MG/DL — HIGH (ref 70–99)
GLUCOSE BLDC GLUCOMTR-MCNC: 150 MG/DL — HIGH (ref 70–99)
GLUCOSE BLDC GLUCOMTR-MCNC: 171 MG/DL — HIGH (ref 70–99)
GLUCOSE SERPL-MCNC: 100 MG/DL — HIGH (ref 70–99)
HCT VFR BLD CALC: 26 % — LOW (ref 34.5–45)
HGB BLD-MCNC: 8.8 G/DL — LOW (ref 11.5–15.5)
MAGNESIUM SERPL-MCNC: 1.1 MG/DL — LOW (ref 1.6–2.6)
MCHC RBC-ENTMCNC: 31.5 PG — SIGNIFICANT CHANGE UP (ref 27–34)
MCHC RBC-ENTMCNC: 33.9 GM/DL — SIGNIFICANT CHANGE UP (ref 32–36)
MCV RBC AUTO: 92.8 FL — SIGNIFICANT CHANGE UP (ref 80–100)
PLATELET # BLD AUTO: 275 K/UL — SIGNIFICANT CHANGE UP (ref 150–400)
POTASSIUM SERPL-MCNC: 2.8 MMOL/L — CRITICAL LOW (ref 3.5–5.3)
POTASSIUM SERPL-SCNC: 2.8 MMOL/L — CRITICAL LOW (ref 3.5–5.3)
RBC # BLD: 2.8 M/UL — LOW (ref 3.8–5.2)
RBC # FLD: 14.3 % — SIGNIFICANT CHANGE UP (ref 10.3–14.5)
SODIUM SERPL-SCNC: 138 MMOL/L — SIGNIFICANT CHANGE UP (ref 135–145)
WBC # BLD: 8.3 K/UL — SIGNIFICANT CHANGE UP (ref 3.8–10.5)
WBC # FLD AUTO: 8.3 K/UL — SIGNIFICANT CHANGE UP (ref 3.8–10.5)

## 2018-11-14 PROCEDURE — 99233 SBSQ HOSP IP/OBS HIGH 50: CPT

## 2018-11-14 PROCEDURE — 71045 X-RAY EXAM CHEST 1 VIEW: CPT | Mod: 26

## 2018-11-14 RX ORDER — CLONAZEPAM 1 MG
1.5 TABLET ORAL AT BEDTIME
Qty: 0 | Refills: 0 | Status: DISCONTINUED | OUTPATIENT
Start: 2018-11-14 | End: 2018-11-19

## 2018-11-14 RX ORDER — HEPARIN SODIUM 5000 [USP'U]/ML
5000 INJECTION INTRAVENOUS; SUBCUTANEOUS
Qty: 0 | Refills: 0 | COMMUNITY
Start: 2018-11-14

## 2018-11-14 RX ORDER — POTASSIUM CHLORIDE 20 MEQ
40 PACKET (EA) ORAL ONCE
Qty: 0 | Refills: 0 | Status: COMPLETED | OUTPATIENT
Start: 2018-11-14 | End: 2018-11-14

## 2018-11-14 RX ORDER — NYSTATIN 500MM UNIT
500000 POWDER (EA) MISCELLANEOUS
Qty: 0 | Refills: 0 | Status: COMPLETED | OUTPATIENT
Start: 2018-11-14 | End: 2018-11-19

## 2018-11-14 RX ORDER — POTASSIUM CHLORIDE 20 MEQ
10 PACKET (EA) ORAL
Qty: 0 | Refills: 0 | Status: COMPLETED | OUTPATIENT
Start: 2018-11-14 | End: 2018-11-14

## 2018-11-14 RX ORDER — MAGNESIUM SULFATE 500 MG/ML
2 VIAL (ML) INJECTION ONCE
Qty: 0 | Refills: 0 | Status: COMPLETED | OUTPATIENT
Start: 2018-11-14 | End: 2018-11-14

## 2018-11-14 RX ORDER — MAGNESIUM OXIDE 400 MG ORAL TABLET 241.3 MG
400 TABLET ORAL EVERY 4 HOURS
Qty: 0 | Refills: 0 | Status: COMPLETED | OUTPATIENT
Start: 2018-11-14 | End: 2018-11-14

## 2018-11-14 RX ADMIN — PANTOPRAZOLE SODIUM 40 MILLIGRAM(S): 20 TABLET, DELAYED RELEASE ORAL at 05:21

## 2018-11-14 RX ADMIN — SERTRALINE 50 MILLIGRAM(S): 25 TABLET, FILM COATED ORAL at 11:17

## 2018-11-14 RX ADMIN — GABAPENTIN 300 MILLIGRAM(S): 400 CAPSULE ORAL at 13:39

## 2018-11-14 RX ADMIN — TIZANIDINE 4 MILLIGRAM(S): 4 TABLET ORAL at 10:02

## 2018-11-14 RX ADMIN — Medication 500000 UNIT(S): at 18:53

## 2018-11-14 RX ADMIN — QUETIAPINE FUMARATE 25 MILLIGRAM(S): 200 TABLET, FILM COATED ORAL at 22:07

## 2018-11-14 RX ADMIN — GABAPENTIN 300 MILLIGRAM(S): 400 CAPSULE ORAL at 22:07

## 2018-11-14 RX ADMIN — HUMAN INSULIN 6 UNIT(S): 100 INJECTION, SUSPENSION SUBCUTANEOUS at 09:24

## 2018-11-14 RX ADMIN — MAGNESIUM OXIDE 400 MG ORAL TABLET 400 MILLIGRAM(S): 241.3 TABLET ORAL at 16:22

## 2018-11-14 RX ADMIN — Medication 7.5 MILLIGRAM(S): at 05:18

## 2018-11-14 RX ADMIN — Medication 0: at 09:23

## 2018-11-14 RX ADMIN — GABAPENTIN 300 MILLIGRAM(S): 400 CAPSULE ORAL at 05:18

## 2018-11-14 RX ADMIN — TIOTROPIUM BROMIDE 1 CAPSULE(S): 18 CAPSULE ORAL; RESPIRATORY (INHALATION) at 11:17

## 2018-11-14 RX ADMIN — MUPIROCIN 1 APPLICATION(S): 20 OINTMENT TOPICAL at 18:40

## 2018-11-14 RX ADMIN — MINOCYCLINE HYDROCHLORIDE 100 MILLIGRAM(S): 45 TABLET, EXTENDED RELEASE ORAL at 05:18

## 2018-11-14 RX ADMIN — Medication 50 GRAM(S): at 13:37

## 2018-11-14 RX ADMIN — LIDOCAINE 1 PATCH: 4 CREAM TOPICAL at 21:58

## 2018-11-14 RX ADMIN — MUPIROCIN 1 APPLICATION(S): 20 OINTMENT TOPICAL at 05:19

## 2018-11-14 RX ADMIN — Medication 300 MILLIGRAM(S): at 13:37

## 2018-11-14 RX ADMIN — Medication 1.5 MILLIGRAM(S): at 22:06

## 2018-11-14 RX ADMIN — LIDOCAINE 1 PATCH: 4 CREAM TOPICAL at 11:18

## 2018-11-14 RX ADMIN — ERGOCALCIFEROL 8000 UNIT(S): 1.25 CAPSULE ORAL at 16:21

## 2018-11-14 RX ADMIN — BUDESONIDE AND FORMOTEROL FUMARATE DIHYDRATE 2 PUFF(S): 160; 4.5 AEROSOL RESPIRATORY (INHALATION) at 05:18

## 2018-11-14 RX ADMIN — Medication 100 MILLIEQUIVALENT(S): at 10:26

## 2018-11-14 RX ADMIN — LIDOCAINE 1 PATCH: 4 CREAM TOPICAL at 22:00

## 2018-11-14 RX ADMIN — MAGNESIUM OXIDE 400 MG ORAL TABLET 400 MILLIGRAM(S): 241.3 TABLET ORAL at 13:38

## 2018-11-14 RX ADMIN — HEPARIN SODIUM 5000 UNIT(S): 5000 INJECTION INTRAVENOUS; SUBCUTANEOUS at 18:39

## 2018-11-14 RX ADMIN — Medication 0: at 14:18

## 2018-11-14 RX ADMIN — Medication 100 MILLIEQUIVALENT(S): at 09:25

## 2018-11-14 RX ADMIN — MIRTAZAPINE 7.5 MILLIGRAM(S): 45 TABLET, ORALLY DISINTEGRATING ORAL at 22:06

## 2018-11-14 RX ADMIN — Medication 100 MILLIEQUIVALENT(S): at 11:12

## 2018-11-14 RX ADMIN — BUDESONIDE AND FORMOTEROL FUMARATE DIHYDRATE 2 PUFF(S): 160; 4.5 AEROSOL RESPIRATORY (INHALATION) at 18:38

## 2018-11-14 RX ADMIN — TIZANIDINE 4 MILLIGRAM(S): 4 TABLET ORAL at 22:08

## 2018-11-14 RX ADMIN — MAGNESIUM OXIDE 400 MG ORAL TABLET 400 MILLIGRAM(S): 241.3 TABLET ORAL at 11:11

## 2018-11-14 RX ADMIN — Medication 40 MILLIEQUIVALENT(S): at 09:24

## 2018-11-14 RX ADMIN — Medication 0: at 18:52

## 2018-11-14 RX ADMIN — CHLORHEXIDINE GLUCONATE 1 APPLICATION(S): 213 SOLUTION TOPICAL at 10:00

## 2018-11-14 RX ADMIN — Medication 3 MILLILITER(S): at 07:45

## 2018-11-14 RX ADMIN — CLOPIDOGREL BISULFATE 75 MILLIGRAM(S): 75 TABLET, FILM COATED ORAL at 11:14

## 2018-11-14 RX ADMIN — FENTANYL CITRATE 1 PATCH: 50 INJECTION INTRAVENOUS at 07:00

## 2018-11-14 RX ADMIN — MINOCYCLINE HYDROCHLORIDE 100 MILLIGRAM(S): 45 TABLET, EXTENDED RELEASE ORAL at 18:39

## 2018-11-14 RX ADMIN — HEPARIN SODIUM 5000 UNIT(S): 5000 INJECTION INTRAVENOUS; SUBCUTANEOUS at 05:19

## 2018-11-14 RX ADMIN — SODIUM CHLORIDE 75 MILLILITER(S): 9 INJECTION INTRAMUSCULAR; INTRAVENOUS; SUBCUTANEOUS at 10:29

## 2018-11-14 RX ADMIN — Medication 3 MILLILITER(S): at 13:42

## 2018-11-14 NOTE — PROVIDER CONTACT NOTE (CRITICAL VALUE NOTIFICATION) - RECOMMENDATIONS
Potassium chloride solution 40milliequivalents via peg x1, potassium chloride 10meq/100 x3 doses.  repeat potassium stat after repletion

## 2018-11-14 NOTE — PROGRESS NOTE ADULT - SUBJECTIVE AND OBJECTIVE BOX
CC: f/u for pneumonia     Patient at baseline, somnolent but arouses, follows commands, no distress  No fevers    REVIEW OF SYSTEMS:  All other review of systems negative (Comprehensive ROS)- limited, d/w daughter at bedside    Antimicrobials Day #    minocycline 100 milliGRAM(s) Oral two times a day    Other Medications Reviewed    Vital Signs Last 24 Hrs  T(C): 36.7 (14 Nov 2018 12:21), Max: 36.9 (14 Nov 2018 05:10)  T(F): 98 (14 Nov 2018 12:21), Max: 98.4 (14 Nov 2018 05:10)  HR: 92 (14 Nov 2018 12:21) (92 - 95)  BP: 105/66 (14 Nov 2018 12:21) (105/66 - 149/84)  BP(mean): --  RR: 18 (14 Nov 2018 12:21) (18 - 18)  SpO2: 97% (14 Nov 2018 12:21) (93% - 97%)    PHYSICAL EXAM:  General: no acute distress  Eyes:  anicteric, no conjunctival injection, no discharge  Oropharynx: no lesions or injection 	  Neck: supple, without adenopathy  Lungs: clear to auscultation anteriorly   Heart: regular rate and rhythm; no murmur, rubs or gallops  Abdomen: soft, nondistended, nontender, G tube clean  Blackman  Skin: faint papular rash chest fading  Extremities: R hip/thigh incision intact, except most distal aspect, very shallow ulceration- clean  Neurologic: somnolent, moves all extremities    LAB RESULTS:                                   8.8    8.3   )-----------( 275      ( 14 Nov 2018 06:44 )             26.0   11-14    138  |  109<H>  |  14  ----------------------------<  100<H>  2.8<LL>   |  19<L>  |  0.36<L>    Ca    6.9<L>      14 Nov 2018 06:43  Mg     1.1     11-14            MICROBIOLOGY:  RECENT CULTURES:  11-06 @ 07:05 .Urine Catheterized Pseudomonas aeruginosa (Carbapenem Resistant)    >100,000 CFU/ml Pseudomonas aeruginosa (Carbapenem Resistant)    11-06 @ 06:14 .Blood Blood-Peripheral     No growth to date.    RADIOLOGY REVIEWED:  Xray Chest 1 View- PORTABLE-Urgent (11.06.18 @ 03:37) >  Patchy opacities in the right upper lung field, may represent   infection. Chronic lung changes cannot be ruled out entirely.

## 2018-11-14 NOTE — PROGRESS NOTE ADULT - SUBJECTIVE AND OBJECTIVE BOX
MYRIAM WHITE:[id number],   68yFemale followed for:  ASA; dye contrast (Anaphylaxis)  aspirin (Short breath)  divalproex sodium (Other (Unknown))  Haldol (Other (Unknown))  penicillin (Short breath; Rash)  sulfa drugs (Short breath; Rash)  Xanax (Other (Unknown))    PAST MEDICAL & SURGICAL HISTORY:  CVA (cerebral vascular accident)  Fatty pancreas  PNA (pneumonia)  Pulmonary HTN  IGT (impaired glucose tolerance)  Ulcerative colitis  Acid reflux  Anxiety  Depression  Mouth sores  HLD (hyperlipidemia)  Asthma  Humeral head fracture  H/O: hysterectomy  H/O cataract extraction, left  History of knee replacement    FAMILY HISTORY:  Family history of dementia (Grandparent)  Family history of colon cancer (Grandparent)    MEDICATIONS  (STANDING):  buDESOnide 160 MICROgram(s)/formoterol 4.5 MICROgram(s) Inhaler 2 Puff(s) Inhalation two times a day  chlorhexidine 4% Liquid 1 Application(s) Topical <User Schedule>  clopidogrel Tablet 75 milliGRAM(s) Oral daily  dextrose 5%. 1000 milliLiter(s) (50 mL/Hr) IV Continuous <Continuous>  dextrose 50% Injectable 12.5 Gram(s) IV Push once  dextrose 50% Injectable 25 Gram(s) IV Push once  dextrose 50% Injectable 25 Gram(s) IV Push once  ergocalciferol Drops 8000 Unit(s) Oral daily  fentaNYL   Patch  12 MICROgram(s)/Hr 1 Patch Transdermal every 72 hours  ferrous    sulfate Liquid 300 milliGRAM(s) Enteral Tube daily  gabapentin 300 milliGRAM(s) Oral every 8 hours  heparin  Injectable 5000 Unit(s) SubCutaneous every 12 hours  insulin lispro (HumaLOG) corrective regimen sliding scale   SubCutaneous every 6 hours  insulin NPH human recombinant 6 Unit(s) SubCutaneous before breakfast  lidocaine   Patch 1 Patch Transdermal daily  magnesium oxide 400 milliGRAM(s) Oral every 4 hours  magnesium sulfate  IVPB 2 Gram(s) IV Intermittent once  medical marijuana oil 1 milliLiter(s) 1 milliLiter(s) SubLingual <User Schedule>  minocycline 100 milliGRAM(s) Oral two times a day  mirtazapine 7.5 milliGRAM(s) Oral at bedtime  mupirocin 2% Ointment 1 Application(s) Topical two times a day  pantoprazole   Suspension 40 milliGRAM(s) Oral before breakfast  potassium chloride  10 mEq/100 mL IVPB 10 milliEquivalent(s) IV Intermittent every 1 hour  predniSONE   Tablet 7.5 milliGRAM(s) Oral daily  QUEtiapine 25 milliGRAM(s) Oral at bedtime  sertraline 50 milliGRAM(s) Oral daily  sodium chloride 0.9%. 1000 milliLiter(s) (75 mL/Hr) IV Continuous <Continuous>  tiotropium 18 MICROgram(s) Capsule 1 Capsule(s) Inhalation daily  tiZANidine 4 milliGRAM(s) Oral <User Schedule>    MEDICATIONS  (PRN):  acetaminophen    Suspension .. 650 milliGRAM(s) Oral every 8 hours PRN Mild Pain (1 - 3)  ALBUTerol/ipratropium for Nebulization 3 milliLiter(s) Nebulizer every 6 hours PRN Shortness of Breath and/or Wheezing  camphor 0.5%/menthol 0.5% Topical Lotion 1 Application(s) Topical three times a day PRN itching  dextrose 40% Gel 15 Gram(s) Oral once PRN Blood Glucose LESS THAN 70 milliGRAM(s)/deciliter  diphenhydrAMINE   Elixir 25 milliGRAM(s) Enteral Tube every 4 hours PRN Itching  glucagon  Injectable 1 milliGRAM(s) IntraMuscular once PRN Glucose LESS THAN 70 milligrams/deciliter  nystatin Powder 1 Application(s) Topical two times a day PRN rash/itchiness      Vital Signs Last 24 Hrs  T(C): 36.9 (14 Nov 2018 05:10), Max: 36.9 (14 Nov 2018 05:10)  T(F): 98.4 (14 Nov 2018 05:10), Max: 98.4 (14 Nov 2018 05:10)  HR: 95 (14 Nov 2018 05:10) (86 - 95)  BP: 149/84 (14 Nov 2018 05:10) (106/64 - 149/84)  BP(mean): --  RR: 18 (14 Nov 2018 05:10) (18 - 19)  SpO2: 96% (14 Nov 2018 05:10) (93% - 97%)  nc/at  s1s2  cta  soft, nt, nd no guarding or rebound  no c/c/e    CBC Full  -  ( 14 Nov 2018 06:44 )  WBC Count : 8.3 K/uL  Hemoglobin : 8.8 g/dL  Hematocrit : 26.0 %  Platelet Count - Automated : 275 K/uL  Mean Cell Volume : 92.8 fl  Mean Cell Hemoglobin : 31.5 pg  Mean Cell Hemoglobin Concentration : 33.9 gm/dL  Auto Neutrophil # : x  Auto Lymphocyte # : x  Auto Monocyte # : x  Auto Eosinophil # : x  Auto Basophil # : x  Auto Neutrophil % : x  Auto Lymphocyte % : x  Auto Monocyte % : x  Auto Eosinophil % : x  Auto Basophil % : x    11-14    138  |  109<H>  |  14  ----------------------------<  100<H>  2.8<LL>   |  19<L>  |  0.36<L>    Ca    6.9<L>      14 Nov 2018 06:43  Mg     1.1     11-14

## 2018-11-14 NOTE — PROGRESS NOTE ADULT - ASSESSMENT
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

## 2018-11-14 NOTE — PROVIDER CONTACT NOTE (CRITICAL VALUE NOTIFICATION) - ACTION/TREATMENT ORDERED:
Potassium chloride solution 40milliequivalents via peg x1, potassium chloride 10meq/100 x3 doses.  repeat potassium stat after repletion. Will continue to monitor.

## 2018-11-14 NOTE — PROGRESS NOTE ADULT - SUBJECTIVE AND OBJECTIVE BOX
---___---___---___---___---___---___ ---___---___---___---___---___---___---___---___---___---                    <<<  M E D I C A L   A T T E N D I N G    F O L L O W    U P   N O T E  >>>    - Patient seen and examined by me approximately thirty minutes ago.  - In summary, patient is a 68y year old Female who origianlly presented with metabolic encephalopathy   - Patient today overall doing ok, comfortable, eating OK.     ---___---___---___---___---___---      <<<  MEDICATIONS:  >>>    MEDICATIONS  (STANDING):  buDESOnide 160 MICROgram(s)/formoterol 4.5 MICROgram(s) Inhaler 2 Puff(s) Inhalation two times a day  chlorhexidine 4% Liquid 1 Application(s) Topical <User Schedule>  clopidogrel Tablet 75 milliGRAM(s) Oral daily  dextrose 5%. 1000 milliLiter(s) (50 mL/Hr) IV Continuous <Continuous>  dextrose 50% Injectable 12.5 Gram(s) IV Push once  dextrose 50% Injectable 25 Gram(s) IV Push once  dextrose 50% Injectable 25 Gram(s) IV Push once  ergocalciferol Drops 8000 Unit(s) Oral daily  fentaNYL   Patch  12 MICROgram(s)/Hr 1 Patch Transdermal every 72 hours  ferrous    sulfate Liquid 300 milliGRAM(s) Enteral Tube daily  gabapentin 300 milliGRAM(s) Oral every 8 hours  heparin  Injectable 5000 Unit(s) SubCutaneous every 12 hours  insulin lispro (HumaLOG) corrective regimen sliding scale   SubCutaneous every 6 hours  insulin NPH human recombinant 6 Unit(s) SubCutaneous before breakfast  lidocaine   Patch 1 Patch Transdermal daily  magnesium oxide 400 milliGRAM(s) Oral every 4 hours  magnesium sulfate  IVPB 2 Gram(s) IV Intermittent once  medical marijuana oil 1 milliLiter(s) 1 milliLiter(s) SubLingual <User Schedule>  minocycline 100 milliGRAM(s) Oral two times a day  mirtazapine 7.5 milliGRAM(s) Oral at bedtime  mupirocin 2% Ointment 1 Application(s) Topical two times a day  pantoprazole   Suspension 40 milliGRAM(s) Oral before breakfast  potassium chloride  10 mEq/100 mL IVPB 10 milliEquivalent(s) IV Intermittent every 1 hour  predniSONE   Tablet 7.5 milliGRAM(s) Oral daily  QUEtiapine 25 milliGRAM(s) Oral at bedtime  sertraline 50 milliGRAM(s) Oral daily  sodium chloride 0.9%. 1000 milliLiter(s) (75 mL/Hr) IV Continuous <Continuous>  tiotropium 18 MICROgram(s) Capsule 1 Capsule(s) Inhalation daily  tiZANidine 4 milliGRAM(s) Oral <User Schedule>      MEDICATIONS  (PRN):  acetaminophen    Suspension .. 650 milliGRAM(s) Oral every 8 hours PRN Mild Pain (1 - 3)  ALBUTerol/ipratropium for Nebulization 3 milliLiter(s) Nebulizer every 6 hours PRN Shortness of Breath and/or Wheezing  camphor 0.5%/menthol 0.5% Topical Lotion 1 Application(s) Topical three times a day PRN itching  dextrose 40% Gel 15 Gram(s) Oral once PRN Blood Glucose LESS THAN 70 milliGRAM(s)/deciliter  diphenhydrAMINE   Elixir 25 milliGRAM(s) Enteral Tube every 4 hours PRN Itching  glucagon  Injectable 1 milliGRAM(s) IntraMuscular once PRN Glucose LESS THAN 70 milligrams/deciliter  nystatin Powder 1 Application(s) Topical two times a day PRN rash/itchiness       ---___---___---___---___---___---     <<<REVIEW OF SYSTEM: >>>    GEN: no fever, no chills, no pain  RESP: no SOB, no cough, no sputum  CVS: no chest pain, no palpitations, no edema  GI: no abdominal pain, no nausea, no vomiting, no constipation, no diarrhea  : no dysurea, no frequency  NEURO: no headache, no dizziness  PSYCH: no depression, not anxious  Derm : no itching, no rash     ---___---___---___---___---___---          <<<  VITAL SIGNS: >>>    T(F): 98.4 (11-14-18 @ 05:10), Max: 98.4 (11-14-18 @ 05:10)  HR: 95 (11-14-18 @ 05:10) (86 - 95)  BP: 149/84 (11-14-18 @ 05:10) (106/64 - 149/84)  RR: 18 (11-14-18 @ 05:10) (18 - 19)  SpO2: 96% (11-14-18 @ 05:10) (93% - 97%)  Wt(kg): --  CAPILLARY BLOOD GLUCOSE      POCT Blood Glucose.: 150 mg/dL (14 Nov 2018 08:50)    I&O's Summary    13 Nov 2018 07:01  -  14 Nov 2018 07:00  --------------------------------------------------------  IN: 2520 mL / OUT: 650 mL / NET: 1870 mL         ---___---___---___---___---___---                       PHYSICAL EXAM:    GEN: A&O X 3 , NAD , comfortable  HEENT: NCAT, PERRL, MMM, no scleral icterus, hearing intact  NECK: Supple, No JVD  CVS: S1S2 , regular , No M/R/G appreciated  PULM: CTA B/L,  no W/R/R appreciated  ABD.: soft. non tender, non distended,  bowel sounds present  Extrem: intact pulses , no edema noted  Derm: No rash or ecchymosis noted  PSYCH: normal mood, no depression, not anxious     ---___---___---___---___---___---     <<<  LAB AND IMAGING: >>>                          8.8    8.3   )-----------( 275      ( 14 Nov 2018 06:44 )             26.0               11-14    138  |  109<H>  |  14  ----------------------------<  100<H>  2.8<LL>   |  19<L>  |  0.36<L>    Ca    6.9<L>      14 Nov 2018 06:43  Mg     1.1     11-14                                 [All pertinent / recent available Imaging reports and other labs reviewed]     ---___---___---___---___---___---___ ---___---___---___---___---           <<<  A S S E S S M E N T   A N D   P L A N :  >>>          -GI/DVT Prophylaxis.    --------------------------------------------  Case discussed with  mr roque   Education given on     >>______________________<<      Deniz Bolden .         phone   [phone]

## 2018-11-14 NOTE — CHART NOTE - NSCHARTNOTEFT_GEN_A_CORE
Dermatology Brief Chart Note    Biopsy of R shoulder with perivascular lymphs and eos with interface changes and slight spongiosis – ddx drug eruption. Pt evaluated at bedside ---- Dermatology Brief Chart Note    Biopsy of R shoulder with perivascular lymphs and eos with interface changes and slight spongiosis – ddx drug eruption. Pt evaluated at bedside, rash and pruritus improving on clobetasol. Would continue clobetasol ointment BID x 2 weeks, then use PRN. At this time, no s/sx to suggest more severe adverse cutaneous reaction.

## 2018-11-14 NOTE — PROGRESS NOTE ADULT - SUBJECTIVE AND OBJECTIVE BOX
PULMONARY PROGRESS NOTE    MYRIAM HEATH  MRN-[id number]    Patient is a 68y old  Female who presents with a chief complaint of lethargy (13 Nov 2018 08:56)      HPI:   at bedside, no further episodes of severe dyspnea, some cough on and off, no other complaints.    ROS:   -no N/V    MEDICATIONS  (STANDING):  buDESOnide 160 MICROgram(s)/formoterol 4.5 MICROgram(s) Inhaler 2 Puff(s) Inhalation two times a day  chlorhexidine 4% Liquid 1 Application(s) Topical <User Schedule>  clonazePAM Tablet 1.5 milliGRAM(s) Oral at bedtime  clopidogrel Tablet 75 milliGRAM(s) Oral daily  dextrose 5%. 1000 milliLiter(s) (50 mL/Hr) IV Continuous <Continuous>  dextrose 50% Injectable 12.5 Gram(s) IV Push once  dextrose 50% Injectable 25 Gram(s) IV Push once  dextrose 50% Injectable 25 Gram(s) IV Push once  ergocalciferol Drops 8000 Unit(s) Oral daily  fentaNYL   Patch  12 MICROgram(s)/Hr 1 Patch Transdermal every 72 hours  ferrous    sulfate Liquid 300 milliGRAM(s) Enteral Tube daily  gabapentin 300 milliGRAM(s) Oral every 8 hours  heparin  Injectable 5000 Unit(s) SubCutaneous every 12 hours  insulin lispro (HumaLOG) corrective regimen sliding scale   SubCutaneous every 6 hours  insulin NPH human recombinant 6 Unit(s) SubCutaneous before breakfast  lidocaine   Patch 1 Patch Transdermal daily  magnesium oxide 400 milliGRAM(s) Oral every 4 hours  magnesium sulfate  IVPB 2 Gram(s) IV Intermittent once  medical marijuana oil 1 milliLiter(s) 1 milliLiter(s) SubLingual <User Schedule>  minocycline 100 milliGRAM(s) Oral two times a day  mirtazapine 7.5 milliGRAM(s) Oral at bedtime  mupirocin 2% Ointment 1 Application(s) Topical two times a day  pantoprazole   Suspension 40 milliGRAM(s) Oral before breakfast  predniSONE   Tablet 7.5 milliGRAM(s) Oral daily  QUEtiapine 25 milliGRAM(s) Oral at bedtime  sertraline 50 milliGRAM(s) Oral daily  sodium chloride 0.9%. 1000 milliLiter(s) (75 mL/Hr) IV Continuous <Continuous>  tiotropium 18 MICROgram(s) Capsule 1 Capsule(s) Inhalation daily  tiZANidine 4 milliGRAM(s) Oral <User Schedule>    MEDICATIONS  (PRN):  acetaminophen    Suspension .. 650 milliGRAM(s) Oral every 8 hours PRN Mild Pain (1 - 3)  ALBUTerol/ipratropium for Nebulization 3 milliLiter(s) Nebulizer every 6 hours PRN Shortness of Breath and/or Wheezing  camphor 0.5%/menthol 0.5% Topical Lotion 1 Application(s) Topical three times a day PRN itching  dextrose 40% Gel 15 Gram(s) Oral once PRN Blood Glucose LESS THAN 70 milliGRAM(s)/deciliter  diphenhydrAMINE   Elixir 25 milliGRAM(s) Enteral Tube every 4 hours PRN Itching  glucagon  Injectable 1 milliGRAM(s) IntraMuscular once PRN Glucose LESS THAN 70 milligrams/deciliter  nystatin Powder 1 Application(s) Topical two times a day PRN rash/itchiness              EXAM:  Vital Signs Last 24 Hrs  T(C): 36.7 (14 Nov 2018 12:21), Max: 36.9 (14 Nov 2018 05:10)  T(F): 98 (14 Nov 2018 12:21), Max: 98.4 (14 Nov 2018 05:10)  HR: 92 (14 Nov 2018 12:21) (92 - 95)  BP: 105/66 (14 Nov 2018 12:21) (105/66 - 149/84)  BP(mean): --  RR: 18 (14 Nov 2018 12:21) (18 - 18)  SpO2: 97% (14 Nov 2018 12:21) (93% - 97%)    GENERAL: The patient is awake and alert in no apparent distress.     LUNGS: very faint exp wheeze L    HEART:S1/S2    LABS/IMAGING: reviewed                                   8.8    8.3   )-----------( 275      ( 14 Nov 2018 06:44 )             26.0   11-14    138  |  109<H>  |  14  ----------------------------<  100<H>  2.8<LL>   |  19<L>  |  0.36<L>    Ca    6.9<L>      14 Nov 2018 06:43  Mg     1.1     11-14        < from: Xray Chest 1 View- PORTABLE-Urgent (11.14.18 @ 09:17) >    IMPRESSION:   Small left and trace right pleural effusions.    < end of copied text >        PROBLEM LIST:  68y Female with HEALTH ISSUES - PROBLEM Dx:  IGT (impaired glucose tolerance): IGT (impaired glucose tolerance)  PNA (pneumonia): PNA (pneumonia)  CVA (cerebral vascular accident): CVA (cerebral vascular accident)  Hyponatremia: Hyponatremia  Cerebrovascular accident (CVA), unspecified mechanism: Cerebrovascular accident (CVA), unspecified mechanism  Mild asthma without complication, unspecified whether persistent: Mild asthma without complication, unspecified whether persistent  Cystitis: Cystitis  Encephalopathy acute: Encephalopathy acute  Acute hip pain, right: Acute hip pain, right  Other ulcerative colitis without complication: Other ulcerative colitis without complication  Depression: Depression  Anxiety: Anxiety  Encephalopathy, metabolic: Encephalopathy, metabolic  Altered mental state: Altered mental state  Pulmonary HTN: Pulmonary HTN  Aspiration pneumonia of left lower lobe, unspecified aspiration pneumonia type: Aspiration pneumonia of left lower lobe, unspecified aspiration pneumonia type        RECS:  -asthma: does not appear to be in exacerbation at this time, continue symbicort, spiriva, duoneb, daily prednisone  -s/p abx for pneumonia  -repeat cxr looks ok  -pain control  -prognosis guarded        Luciana Mancini MD   [phone]

## 2018-11-14 NOTE — CHART NOTE - NSCHARTNOTEFT_GEN_A_CORE
Pt with bolus tube feedings per PEG. Currently on Vital AF which pt is tolerating. No watery diarrhea noted.   Per daughter pt had watery  diarrhea with Jevity tube feeding, now resolved after being on Vital AF.   -Pt will benefit with continuing on Vital AF tube feeds.    Ritu Morataya, NP [facility] [id number]

## 2018-11-15 LAB
ANION GAP SERPL CALC-SCNC: 13 MMOL/L — SIGNIFICANT CHANGE UP (ref 5–17)
BUN SERPL-MCNC: 18 MG/DL — SIGNIFICANT CHANGE UP (ref 7–23)
CALCIUM SERPL-MCNC: 8.6 MG/DL — SIGNIFICANT CHANGE UP (ref 8.4–10.5)
CHLORIDE SERPL-SCNC: 99 MMOL/L — SIGNIFICANT CHANGE UP (ref 96–108)
CO2 SERPL-SCNC: 20 MMOL/L — LOW (ref 22–31)
CREAT SERPL-MCNC: 0.36 MG/DL — LOW (ref 0.5–1.3)
GLUCOSE BLDC GLUCOMTR-MCNC: 105 MG/DL — HIGH (ref 70–99)
GLUCOSE BLDC GLUCOMTR-MCNC: 112 MG/DL — HIGH (ref 70–99)
GLUCOSE BLDC GLUCOMTR-MCNC: 116 MG/DL — HIGH (ref 70–99)
GLUCOSE BLDC GLUCOMTR-MCNC: 169 MG/DL — HIGH (ref 70–99)
GLUCOSE SERPL-MCNC: 104 MG/DL — HIGH (ref 70–99)
HCT VFR BLD CALC: 29.4 % — LOW (ref 34.5–45)
HGB BLD-MCNC: 9.9 G/DL — LOW (ref 11.5–15.5)
MAGNESIUM SERPL-MCNC: 1.7 MG/DL — SIGNIFICANT CHANGE UP (ref 1.6–2.6)
MCHC RBC-ENTMCNC: 30.9 PG — SIGNIFICANT CHANGE UP (ref 27–34)
MCHC RBC-ENTMCNC: 33.6 GM/DL — SIGNIFICANT CHANGE UP (ref 32–36)
MCV RBC AUTO: 91.8 FL — SIGNIFICANT CHANGE UP (ref 80–100)
PLATELET # BLD AUTO: 312 K/UL — SIGNIFICANT CHANGE UP (ref 150–400)
POTASSIUM SERPL-MCNC: 3.7 MMOL/L — SIGNIFICANT CHANGE UP (ref 3.5–5.3)
POTASSIUM SERPL-SCNC: 3.7 MMOL/L — SIGNIFICANT CHANGE UP (ref 3.5–5.3)
RBC # BLD: 3.2 M/UL — LOW (ref 3.8–5.2)
RBC # FLD: 14.4 % — SIGNIFICANT CHANGE UP (ref 10.3–14.5)
SODIUM SERPL-SCNC: 132 MMOL/L — LOW (ref 135–145)
WBC # BLD: 10.2 K/UL — SIGNIFICANT CHANGE UP (ref 3.8–10.5)
WBC # FLD AUTO: 10.2 K/UL — SIGNIFICANT CHANGE UP (ref 3.8–10.5)

## 2018-11-15 PROCEDURE — 99231 SBSQ HOSP IP/OBS SF/LOW 25: CPT

## 2018-11-15 RX ADMIN — LIDOCAINE 1 PATCH: 4 CREAM TOPICAL at 20:59

## 2018-11-15 RX ADMIN — TIZANIDINE 4 MILLIGRAM(S): 4 TABLET ORAL at 10:08

## 2018-11-15 RX ADMIN — MUPIROCIN 1 APPLICATION(S): 20 OINTMENT TOPICAL at 05:51

## 2018-11-15 RX ADMIN — ERGOCALCIFEROL 8000 UNIT(S): 1.25 CAPSULE ORAL at 11:09

## 2018-11-15 RX ADMIN — GABAPENTIN 300 MILLIGRAM(S): 400 CAPSULE ORAL at 21:58

## 2018-11-15 RX ADMIN — Medication 500000 UNIT(S): at 17:52

## 2018-11-15 RX ADMIN — BUDESONIDE AND FORMOTEROL FUMARATE DIHYDRATE 2 PUFF(S): 160; 4.5 AEROSOL RESPIRATORY (INHALATION) at 05:50

## 2018-11-15 RX ADMIN — TIOTROPIUM BROMIDE 1 CAPSULE(S): 18 CAPSULE ORAL; RESPIRATORY (INHALATION) at 11:11

## 2018-11-15 RX ADMIN — CHLORHEXIDINE GLUCONATE 1 APPLICATION(S): 213 SOLUTION TOPICAL at 10:05

## 2018-11-15 RX ADMIN — TIZANIDINE 4 MILLIGRAM(S): 4 TABLET ORAL at 20:55

## 2018-11-15 RX ADMIN — MUPIROCIN 1 APPLICATION(S): 20 OINTMENT TOPICAL at 17:52

## 2018-11-15 RX ADMIN — FENTANYL CITRATE 1 PATCH: 50 INJECTION INTRAVENOUS at 00:55

## 2018-11-15 RX ADMIN — Medication 1.5 MILLIGRAM(S): at 21:57

## 2018-11-15 RX ADMIN — MINOCYCLINE HYDROCHLORIDE 100 MILLIGRAM(S): 45 TABLET, EXTENDED RELEASE ORAL at 05:50

## 2018-11-15 RX ADMIN — QUETIAPINE FUMARATE 25 MILLIGRAM(S): 200 TABLET, FILM COATED ORAL at 21:58

## 2018-11-15 RX ADMIN — Medication 300 MILLIGRAM(S): at 13:02

## 2018-11-15 RX ADMIN — PANTOPRAZOLE SODIUM 40 MILLIGRAM(S): 20 TABLET, DELAYED RELEASE ORAL at 06:02

## 2018-11-15 RX ADMIN — Medication 1: at 12:52

## 2018-11-15 RX ADMIN — FENTANYL CITRATE 1 PATCH: 50 INJECTION INTRAVENOUS at 18:42

## 2018-11-15 RX ADMIN — SERTRALINE 50 MILLIGRAM(S): 25 TABLET, FILM COATED ORAL at 11:10

## 2018-11-15 RX ADMIN — Medication 0: at 18:36

## 2018-11-15 RX ADMIN — FENTANYL CITRATE 1 PATCH: 50 INJECTION INTRAVENOUS at 19:53

## 2018-11-15 RX ADMIN — HEPARIN SODIUM 5000 UNIT(S): 5000 INJECTION INTRAVENOUS; SUBCUTANEOUS at 17:54

## 2018-11-15 RX ADMIN — HUMAN INSULIN 6 UNIT(S): 100 INJECTION, SUSPENSION SUBCUTANEOUS at 06:13

## 2018-11-15 RX ADMIN — Medication 500000 UNIT(S): at 05:51

## 2018-11-15 RX ADMIN — Medication 3 MILLILITER(S): at 15:23

## 2018-11-15 RX ADMIN — Medication 25 MILLIGRAM(S): at 10:12

## 2018-11-15 RX ADMIN — LIDOCAINE 1 PATCH: 4 CREAM TOPICAL at 11:09

## 2018-11-15 RX ADMIN — Medication 7.5 MILLIGRAM(S): at 05:49

## 2018-11-15 RX ADMIN — CLOPIDOGREL BISULFATE 75 MILLIGRAM(S): 75 TABLET, FILM COATED ORAL at 11:09

## 2018-11-15 RX ADMIN — GABAPENTIN 300 MILLIGRAM(S): 400 CAPSULE ORAL at 13:03

## 2018-11-15 RX ADMIN — MIRTAZAPINE 7.5 MILLIGRAM(S): 45 TABLET, ORALLY DISINTEGRATING ORAL at 21:58

## 2018-11-15 RX ADMIN — BUDESONIDE AND FORMOTEROL FUMARATE DIHYDRATE 2 PUFF(S): 160; 4.5 AEROSOL RESPIRATORY (INHALATION) at 17:51

## 2018-11-15 RX ADMIN — FENTANYL CITRATE 1 PATCH: 50 INJECTION INTRAVENOUS at 07:00

## 2018-11-15 RX ADMIN — GABAPENTIN 300 MILLIGRAM(S): 400 CAPSULE ORAL at 05:50

## 2018-11-15 RX ADMIN — HEPARIN SODIUM 5000 UNIT(S): 5000 INJECTION INTRAVENOUS; SUBCUTANEOUS at 05:50

## 2018-11-15 RX ADMIN — MINOCYCLINE HYDROCHLORIDE 100 MILLIGRAM(S): 45 TABLET, EXTENDED RELEASE ORAL at 17:51

## 2018-11-15 RX ADMIN — FENTANYL CITRATE 1 PATCH: 50 INJECTION INTRAVENOUS at 01:00

## 2018-11-15 NOTE — PROGRESS NOTE ADULT - SUBJECTIVE AND OBJECTIVE BOX
PULMONARY PROGRESS NOTE    MYRIAM HEATH  MRN-[id number]    Patient is a 68y old  Female who presents with a chief complaint of lethargy (13 Nov 2018 08:56)      HPI:   at bedside, no further episodes of severe dyspnea, no other complaints.      MEDICATIONS  (STANDING):  buDESOnide 160 MICROgram(s)/formoterol 4.5 MICROgram(s) Inhaler 2 Puff(s) Inhalation two times a day  chlorhexidine 4% Liquid 1 Application(s) Topical <User Schedule>  clonazePAM Tablet 1.5 milliGRAM(s) Oral at bedtime  clopidogrel Tablet 75 milliGRAM(s) Oral daily  dextrose 5%. 1000 milliLiter(s) (50 mL/Hr) IV Continuous <Continuous>  dextrose 50% Injectable 12.5 Gram(s) IV Push once  dextrose 50% Injectable 25 Gram(s) IV Push once  dextrose 50% Injectable 25 Gram(s) IV Push once  ergocalciferol Drops 8000 Unit(s) Oral daily  fentaNYL   Patch  12 MICROgram(s)/Hr 1 Patch Transdermal every 72 hours  ferrous    sulfate Liquid 300 milliGRAM(s) Enteral Tube daily  gabapentin 300 milliGRAM(s) Oral every 8 hours  heparin  Injectable 5000 Unit(s) SubCutaneous every 12 hours  insulin lispro (HumaLOG) corrective regimen sliding scale   SubCutaneous every 6 hours  insulin NPH human recombinant 6 Unit(s) SubCutaneous before breakfast  lidocaine   Patch 1 Patch Transdermal daily  minocycline 100 milliGRAM(s) Oral two times a day  mirtazapine 7.5 milliGRAM(s) Oral at bedtime  mupirocin 2% Ointment 1 Application(s) Topical two times a day  nystatin    Suspension [id number] Unit(s) Oral two times a day  pantoprazole   Suspension 40 milliGRAM(s) Oral before breakfast  predniSONE   Tablet 7.5 milliGRAM(s) Oral daily  QUEtiapine 25 milliGRAM(s) Oral at bedtime  sertraline 50 milliGRAM(s) Oral daily  tiotropium 18 MICROgram(s) Capsule 1 Capsule(s) Inhalation daily  tiZANidine 4 milliGRAM(s) Oral <User Schedule>    MEDICATIONS  (PRN):  acetaminophen    Suspension .. 650 milliGRAM(s) Oral every 8 hours PRN Mild Pain (1 - 3)  ALBUTerol/ipratropium for Nebulization 3 milliLiter(s) Nebulizer every 6 hours PRN Shortness of Breath and/or Wheezing  camphor 0.5%/menthol 0.5% Topical Lotion 1 Application(s) Topical three times a day PRN itching  dextrose 40% Gel 15 Gram(s) Oral once PRN Blood Glucose LESS THAN 70 milliGRAM(s)/deciliter  diphenhydrAMINE   Elixir 25 milliGRAM(s) Enteral Tube every 4 hours PRN Itching  glucagon  Injectable 1 milliGRAM(s) IntraMuscular once PRN Glucose LESS THAN 70 milligrams/deciliter  nystatin Powder 1 Application(s) Topical two times a day PRN rash/itchiness      EXAM:  Vital Signs Last 24 Hrs  T(C): 36.7 (15 Nov 2018 10:45), Max: 36.8 (15 Nov 2018 04:29)  T(F): 98.1 (15 Nov 2018 10:45), Max: 98.3 (15 Nov 2018 04:29)  HR: 92 (15 Nov 2018 10:45) (92 - 96)  BP: 132/84 (15 Nov 2018 10:45) (132/84 - 157/88)  BP(mean): --  RR: 18 (15 Nov 2018 10:45) (18 - 18)  SpO2: 97% (15 Nov 2018 10:45) (96% - 97%)    GENERAL: The patient is awake and alert in no apparent distress.       LABS/IMAGING: reviewed                                   9.9    10.2  )-----------( 312      ( 15 Nov 2018 06:26 )             29.4   11-15    132<L>  |  99  |  18  ----------------------------<  104<H>  3.7   |  20<L>  |  0.36<L>    Ca    8.6      15 Nov 2018 06:26  Mg     1.7     11-15        < from: Xray Chest 1 View- PORTABLE-Urgent (11.14.18 @ 09:17) >    IMPRESSION:   Small left and trace right pleural effusions.    < end of copied text >        PROBLEM LIST:  68y Female with HEALTH ISSUES - PROBLEM Dx:  IGT (impaired glucose tolerance): IGT (impaired glucose tolerance)  PNA (pneumonia): PNA (pneumonia)  CVA (cerebral vascular accident): CVA (cerebral vascular accident)  Hyponatremia: Hyponatremia  Cerebrovascular accident (CVA), unspecified mechanism: Cerebrovascular accident (CVA), unspecified mechanism  Mild asthma without complication, unspecified whether persistent: Mild asthma without complication, unspecified whether persistent  Cystitis: Cystitis  Encephalopathy acute: Encephalopathy acute  Acute hip pain, right: Acute hip pain, right  Other ulcerative colitis without complication: Other ulcerative colitis without complication  Depression: Depression  Anxiety: Anxiety  Encephalopathy, metabolic: Encephalopathy, metabolic  Altered mental state: Altered mental state  Pulmonary HTN: Pulmonary HTN  Aspiration pneumonia of left lower lobe, unspecified aspiration pneumonia type: Aspiration pneumonia of left lower lobe, unspecified aspiration pneumonia type        RECS:  -asthma: does not appear to be in exacerbation at this time, continue symbicort, spiriva, duoneb, daily prednisone  -s/p abx for pneumonia  -repeat cxr looks ok  -pain control  -prognosis guarded        Luciana Mancini MD   [phone]

## 2018-11-15 NOTE — PROGRESS NOTE ADULT - SUBJECTIVE AND OBJECTIVE BOX
MYRIAM WHITE:[id number],   68yFemale followed for:  ASA; dye contrast (Anaphylaxis)  aspirin (Short breath)  divalproex sodium (Other (Unknown))  Haldol (Other (Unknown))  penicillin (Short breath; Rash)  sulfa drugs (Short breath; Rash)  Xanax (Other (Unknown))    PAST MEDICAL & SURGICAL HISTORY:  CVA (cerebral vascular accident)  Fatty pancreas  PNA (pneumonia)  Pulmonary HTN  IGT (impaired glucose tolerance)  Ulcerative colitis  Acid reflux  Anxiety  Depression  Mouth sores  HLD (hyperlipidemia)  Asthma  Humeral head fracture  H/O: hysterectomy  H/O cataract extraction, left  History of knee replacement    FAMILY HISTORY:  Family history of dementia (Grandparent)  Family history of colon cancer (Grandparent)    MEDICATIONS  (STANDING):  buDESOnide 160 MICROgram(s)/formoterol 4.5 MICROgram(s) Inhaler 2 Puff(s) Inhalation two times a day  chlorhexidine 4% Liquid 1 Application(s) Topical <User Schedule>  clonazePAM Tablet 1.5 milliGRAM(s) Oral at bedtime  clopidogrel Tablet 75 milliGRAM(s) Oral daily  dextrose 5%. 1000 milliLiter(s) (50 mL/Hr) IV Continuous <Continuous>  dextrose 50% Injectable 12.5 Gram(s) IV Push once  dextrose 50% Injectable 25 Gram(s) IV Push once  dextrose 50% Injectable 25 Gram(s) IV Push once  ergocalciferol Drops 8000 Unit(s) Oral daily  fentaNYL   Patch  12 MICROgram(s)/Hr 1 Patch Transdermal every 72 hours  ferrous    sulfate Liquid 300 milliGRAM(s) Enteral Tube daily  gabapentin 300 milliGRAM(s) Oral every 8 hours  heparin  Injectable 5000 Unit(s) SubCutaneous every 12 hours  insulin lispro (HumaLOG) corrective regimen sliding scale   SubCutaneous every 6 hours  insulin NPH human recombinant 6 Unit(s) SubCutaneous before breakfast  lidocaine   Patch 1 Patch Transdermal daily  minocycline 100 milliGRAM(s) Oral two times a day  mirtazapine 7.5 milliGRAM(s) Oral at bedtime  mupirocin 2% Ointment 1 Application(s) Topical two times a day  nystatin    Suspension [id number] Unit(s) Oral two times a day  pantoprazole   Suspension 40 milliGRAM(s) Oral before breakfast  predniSONE   Tablet 7.5 milliGRAM(s) Oral daily  QUEtiapine 25 milliGRAM(s) Oral at bedtime  sertraline 50 milliGRAM(s) Oral daily  tiotropium 18 MICROgram(s) Capsule 1 Capsule(s) Inhalation daily  tiZANidine 4 milliGRAM(s) Oral <User Schedule>    MEDICATIONS  (PRN):  acetaminophen    Suspension .. 650 milliGRAM(s) Oral every 8 hours PRN Mild Pain (1 - 3)  ALBUTerol/ipratropium for Nebulization 3 milliLiter(s) Nebulizer every 6 hours PRN Shortness of Breath and/or Wheezing  camphor 0.5%/menthol 0.5% Topical Lotion 1 Application(s) Topical three times a day PRN itching  dextrose 40% Gel 15 Gram(s) Oral once PRN Blood Glucose LESS THAN 70 milliGRAM(s)/deciliter  diphenhydrAMINE   Elixir 25 milliGRAM(s) Enteral Tube every 4 hours PRN Itching  glucagon  Injectable 1 milliGRAM(s) IntraMuscular once PRN Glucose LESS THAN 70 milligrams/deciliter  nystatin Powder 1 Application(s) Topical two times a day PRN rash/itchiness      Vital Signs Last 24 Hrs  T(C): 36.8 (15 Nov 2018 04:29), Max: 36.8 (15 Nov 2018 04:29)  T(F): 98.3 (15 Nov 2018 04:29), Max: 98.3 (15 Nov 2018 04:29)  HR: 96 (15 Nov 2018 04:29) (92 - 96)  BP: 138/83 (15 Nov 2018 04:29) (105/66 - 157/88)  BP(mean): --  RR: 18 (15 Nov 2018 04:29) (18 - 18)  SpO2: 97% (15 Nov 2018 04:29) (96% - 97%)  nc/at  s1s2  cta  soft, nt, nd no guarding or rebound  no c/c/e    CBC Full  -  ( 15 Nov 2018 06:26 )  WBC Count : 10.2 K/uL  Hemoglobin : 9.9 g/dL  Hematocrit : 29.4 %  Platelet Count - Automated : 312 K/uL  Mean Cell Volume : 91.8 fl  Mean Cell Hemoglobin : 30.9 pg  Mean Cell Hemoglobin Concentration : 33.6 gm/dL  Auto Neutrophil # : x  Auto Lymphocyte # : x  Auto Monocyte # : x  Auto Eosinophil # : x  Auto Basophil # : x  Auto Neutrophil % : x  Auto Lymphocyte % : x  Auto Monocyte % : x  Auto Eosinophil % : x  Auto Basophil % : x    11-15    132<L>  |  99  |  18  ----------------------------<  104<H>  3.7   |  20<L>  |  0.36<L>    Ca    8.6      15 Nov 2018 06:26  Mg     1.7     11-15

## 2018-11-15 NOTE — CHART NOTE - NSCHARTNOTEFT_GEN_A_CORE
67 yo female with PMH CVA (ICD10 I63.9), dementia (ICD 10 F03.90), with PEG due to dysphagia (ICD10 R13.10) with no improvement of swallowing , had failed multiple MBS as recent as Aug 2018  . Pt now status post treatment of  Aspiration Pneumonia (ICD10 J69.0) with IV antibiotics. Pt will continue on PEG tube feeding with Vital AF as pt will not have repeat swallow study for minimally 3 months.    Ritu Morataya, NP [facility] [id number]

## 2018-11-15 NOTE — PROGRESS NOTE ADULT - SUBJECTIVE AND OBJECTIVE BOX
---___---___---___---___---___---___ ---___---___---___---___---___---___---___---___---___---                    <<<  M E D I C A L   A T T E N D I N G    F O L L O W    U P   N O T E  >>>    - Patient seen and examined by me approximately thirty minutes ago.  - In summary, patient is a 68y year old Female who origianlly presented with metabolic encephalopathy   - Patient today overall doing ok, comfortable, eating OK.     ---___---___---___---___---___---      <<<  MEDICATIONS:  >>>    MEDICATIONS  (STANDING):  buDESOnide 160 MICROgram(s)/formoterol 4.5 MICROgram(s) Inhaler 2 Puff(s) Inhalation two times a day  chlorhexidine 4% Liquid 1 Application(s) Topical <User Schedule>  clonazePAM Tablet 1.5 milliGRAM(s) Oral at bedtime  clopidogrel Tablet 75 milliGRAM(s) Oral daily  dextrose 5%. 1000 milliLiter(s) (50 mL/Hr) IV Continuous <Continuous>  dextrose 50% Injectable 12.5 Gram(s) IV Push once  dextrose 50% Injectable 25 Gram(s) IV Push once  dextrose 50% Injectable 25 Gram(s) IV Push once  ergocalciferol Drops 8000 Unit(s) Oral daily  fentaNYL   Patch  12 MICROgram(s)/Hr 1 Patch Transdermal every 72 hours  ferrous    sulfate Liquid 300 milliGRAM(s) Enteral Tube daily  gabapentin 300 milliGRAM(s) Oral every 8 hours  heparin  Injectable 5000 Unit(s) SubCutaneous every 12 hours  insulin lispro (HumaLOG) corrective regimen sliding scale   SubCutaneous every 6 hours  insulin NPH human recombinant 6 Unit(s) SubCutaneous before breakfast  lidocaine   Patch 1 Patch Transdermal daily  minocycline 100 milliGRAM(s) Oral two times a day  mirtazapine 7.5 milliGRAM(s) Oral at bedtime  mupirocin 2% Ointment 1 Application(s) Topical two times a day  nystatin    Suspension 087044 Unit(s) Oral two times a day  pantoprazole   Suspension 40 milliGRAM(s) Oral before breakfast  predniSONE   Tablet 7.5 milliGRAM(s) Oral daily  QUEtiapine 25 milliGRAM(s) Oral at bedtime  sertraline 50 milliGRAM(s) Oral daily  tiotropium 18 MICROgram(s) Capsule 1 Capsule(s) Inhalation daily  tiZANidine 4 milliGRAM(s) Oral <User Schedule>      MEDICATIONS  (PRN):  acetaminophen    Suspension .. 650 milliGRAM(s) Oral every 8 hours PRN Mild Pain (1 - 3)  ALBUTerol/ipratropium for Nebulization 3 milliLiter(s) Nebulizer every 6 hours PRN Shortness of Breath and/or Wheezing  camphor 0.5%/menthol 0.5% Topical Lotion 1 Application(s) Topical three times a day PRN itching  dextrose 40% Gel 15 Gram(s) Oral once PRN Blood Glucose LESS THAN 70 milliGRAM(s)/deciliter  diphenhydrAMINE   Elixir 25 milliGRAM(s) Enteral Tube every 4 hours PRN Itching  glucagon  Injectable 1 milliGRAM(s) IntraMuscular once PRN Glucose LESS THAN 70 milligrams/deciliter  nystatin Powder 1 Application(s) Topical two times a day PRN rash/itchiness       ---___---___---___---___---___---     <<<REVIEW OF SYSTEM: >>>    GEN: no fever, no chills, no pain  RESP: no SOB, no cough, no sputum  CVS: no chest pain, no palpitations, no edema  GI: no abdominal pain, no nausea, no vomiting, no constipation, no diarrhea   : no dysurea, no frequency  NEURO: no headache, no dizziness  PSYCH: no depression, not anxious  Derm : no itching, no rash     ---___---___---___---___---___---          <<<  VITAL SIGNS: >>>    T(F): 98.1 (11-15-18 @ 10:45), Max: 98.3 (11-15-18 @ 04:29)  HR: 92 (11-15-18 @ 10:45) (92 - 96)  BP: 132/84 (11-15-18 @ 10:45) (132/84 - 157/88)  RR: 18 (11-15-18 @ 10:45) (18 - 18)  SpO2: 97% (11-15-18 @ 10:45) (96% - 97%)  Wt(kg): --  CAPILLARY BLOOD GLUCOSE      POCT Blood Glucose.: 169 mg/dL (15 Nov 2018 11:56)    I&O's Summary    14 Nov 2018 07:01  -  15 Nov 2018 07:00  --------------------------------------------------------  IN: 1210 mL / OUT: 525 mL / NET: 685 mL    15 Nov 2018 07:01  -  15 Nov 2018 15:15  --------------------------------------------------------  IN: 0 mL / OUT: 500 mL / NET: -500 mL         ---___---___---___---___---___---                       PHYSICAL EXAM:    GEN: A&O X 2 , NAD , comfortable laying in bed   HEENT: NCAT, PERRL, MMM, no scleral icterus, hearing intact  NECK: Supple, No JVD  CVS: S1S2 , regular , No M/R/G appreciated  PULM: CTA B/L,  no W/R/R appreciated  ABD.: soft. non tender, non distended,  bowel sounds present (+) peg noted   Extrem: intact pulses , no edema noted  (+) gavin noted   Derm: rash improved  wound still in open to the sacrum.   PSYCH: normal mood,  depression,  anxious     ---___---___---___---___---___---     <<<  LAB AND IMAGING: >>>                          9.9    10.2  )-----------( 312      ( 15 Nov 2018 06:26 )             29.4               11-15    132<L>  |  99  |  18  ----------------------------<  104<H>  3.7   |  20<L>  |  0.36<L>    Ca    8.6      15 Nov 2018 06:26  Mg     1.7     11-15                                 [All pertinent / recent available Imaging reports and other labs reviewed]     ---___---___---___---___---___---___ ---___---___---___---___---           <<<  A S S E S S M E N T   A N D   P L A N :  >>>          -GI/DVT Prophylaxis.    --------------------------------------------  Case discussed with   Education given on     >>______________________<<      Deniz Bolden .         phone   [phone]

## 2018-11-15 NOTE — PROGRESS NOTE ADULT - ASSESSMENT
metabolic encephalopathy  anxiety  chronic pain   uti  pneumonia   functional quadraplegia  sacral       patient in need of home care  and needs  all her durable medical equipment and home aid and vns in place in order for her to go home .    to try and get everything in place.  will also need home pt as well in order to get her more activity and try to increase her ambulation

## 2018-11-16 LAB
GLUCOSE BLDC GLUCOMTR-MCNC: 119 MG/DL — HIGH (ref 70–99)
GLUCOSE BLDC GLUCOMTR-MCNC: 122 MG/DL — HIGH (ref 70–99)
GLUCOSE BLDC GLUCOMTR-MCNC: 125 MG/DL — HIGH (ref 70–99)
GLUCOSE BLDC GLUCOMTR-MCNC: 168 MG/DL — HIGH (ref 70–99)

## 2018-11-16 RX ADMIN — MINOCYCLINE HYDROCHLORIDE 100 MILLIGRAM(S): 45 TABLET, EXTENDED RELEASE ORAL at 06:29

## 2018-11-16 RX ADMIN — Medication 3 MILLILITER(S): at 22:37

## 2018-11-16 RX ADMIN — Medication 1.5 MILLIGRAM(S): at 22:31

## 2018-11-16 RX ADMIN — HUMAN INSULIN 6 UNIT(S): 100 INJECTION, SUSPENSION SUBCUTANEOUS at 06:24

## 2018-11-16 RX ADMIN — HEPARIN SODIUM 5000 UNIT(S): 5000 INJECTION INTRAVENOUS; SUBCUTANEOUS at 06:29

## 2018-11-16 RX ADMIN — GABAPENTIN 300 MILLIGRAM(S): 400 CAPSULE ORAL at 14:18

## 2018-11-16 RX ADMIN — Medication 3 MILLILITER(S): at 06:42

## 2018-11-16 RX ADMIN — TIZANIDINE 4 MILLIGRAM(S): 4 TABLET ORAL at 09:43

## 2018-11-16 RX ADMIN — MUPIROCIN 1 APPLICATION(S): 20 OINTMENT TOPICAL at 18:37

## 2018-11-16 RX ADMIN — Medication 3 MILLILITER(S): at 14:17

## 2018-11-16 RX ADMIN — MINOCYCLINE HYDROCHLORIDE 100 MILLIGRAM(S): 45 TABLET, EXTENDED RELEASE ORAL at 18:38

## 2018-11-16 RX ADMIN — Medication 1: at 12:21

## 2018-11-16 RX ADMIN — Medication 7.5 MILLIGRAM(S): at 06:29

## 2018-11-16 RX ADMIN — ERGOCALCIFEROL 8000 UNIT(S): 1.25 CAPSULE ORAL at 14:16

## 2018-11-16 RX ADMIN — LIDOCAINE 1 PATCH: 4 CREAM TOPICAL at 23:50

## 2018-11-16 RX ADMIN — BUDESONIDE AND FORMOTEROL FUMARATE DIHYDRATE 2 PUFF(S): 160; 4.5 AEROSOL RESPIRATORY (INHALATION) at 18:39

## 2018-11-16 RX ADMIN — HEPARIN SODIUM 5000 UNIT(S): 5000 INJECTION INTRAVENOUS; SUBCUTANEOUS at 18:38

## 2018-11-16 RX ADMIN — LIDOCAINE 1 PATCH: 4 CREAM TOPICAL at 20:00

## 2018-11-16 RX ADMIN — Medication 1 APPLICATION(S): at 22:30

## 2018-11-16 RX ADMIN — NYSTATIN CREAM 1 APPLICATION(S): 100000 CREAM TOPICAL at 18:38

## 2018-11-16 RX ADMIN — CLOPIDOGREL BISULFATE 75 MILLIGRAM(S): 75 TABLET, FILM COATED ORAL at 11:55

## 2018-11-16 RX ADMIN — MIRTAZAPINE 7.5 MILLIGRAM(S): 45 TABLET, ORALLY DISINTEGRATING ORAL at 22:31

## 2018-11-16 RX ADMIN — TIZANIDINE 4 MILLIGRAM(S): 4 TABLET ORAL at 22:31

## 2018-11-16 RX ADMIN — SERTRALINE 50 MILLIGRAM(S): 25 TABLET, FILM COATED ORAL at 11:55

## 2018-11-16 RX ADMIN — Medication 500000 UNIT(S): at 06:28

## 2018-11-16 RX ADMIN — LIDOCAINE 1 PATCH: 4 CREAM TOPICAL at 00:21

## 2018-11-16 RX ADMIN — MUPIROCIN 1 APPLICATION(S): 20 OINTMENT TOPICAL at 06:24

## 2018-11-16 RX ADMIN — Medication 500000 UNIT(S): at 18:39

## 2018-11-16 RX ADMIN — GABAPENTIN 300 MILLIGRAM(S): 400 CAPSULE ORAL at 06:29

## 2018-11-16 RX ADMIN — QUETIAPINE FUMARATE 25 MILLIGRAM(S): 200 TABLET, FILM COATED ORAL at 22:31

## 2018-11-16 RX ADMIN — CHLORHEXIDINE GLUCONATE 1 APPLICATION(S): 213 SOLUTION TOPICAL at 09:40

## 2018-11-16 RX ADMIN — Medication 1 APPLICATION(S): at 06:22

## 2018-11-16 RX ADMIN — BUDESONIDE AND FORMOTEROL FUMARATE DIHYDRATE 2 PUFF(S): 160; 4.5 AEROSOL RESPIRATORY (INHALATION) at 06:23

## 2018-11-16 RX ADMIN — TIOTROPIUM BROMIDE 1 CAPSULE(S): 18 CAPSULE ORAL; RESPIRATORY (INHALATION) at 11:55

## 2018-11-16 RX ADMIN — Medication 300 MILLIGRAM(S): at 11:55

## 2018-11-16 RX ADMIN — LIDOCAINE 1 PATCH: 4 CREAM TOPICAL at 11:55

## 2018-11-16 RX ADMIN — FENTANYL CITRATE 1 PATCH: 50 INJECTION INTRAVENOUS at 19:59

## 2018-11-16 RX ADMIN — Medication 0: at 18:36

## 2018-11-16 RX ADMIN — FENTANYL CITRATE 1 PATCH: 50 INJECTION INTRAVENOUS at 07:00

## 2018-11-16 RX ADMIN — PANTOPRAZOLE SODIUM 40 MILLIGRAM(S): 20 TABLET, DELAYED RELEASE ORAL at 06:29

## 2018-11-16 RX ADMIN — GABAPENTIN 300 MILLIGRAM(S): 400 CAPSULE ORAL at 22:31

## 2018-11-16 NOTE — PROGRESS NOTE ADULT - ASSESSMENT
anxiety  depression   metabolic encephalopathy   uti   pneumonia   cva     if dyspnea does not improve will get abg    continue wound care to the sacral decubitus   will need monthly gavin changes

## 2018-11-16 NOTE — PROGRESS NOTE ADULT - SUBJECTIVE AND OBJECTIVE BOX
---___---___---___---___---___---___ ---___---___---___---___---___---___---___---___---___---                    <<<  M E D I C A L   A T T E N D I N G    F O L L O W    U P   N O T E  >>>    - Patient seen and examined by me approximately thirty minutes ago.  - In summary, patient is a 68y year old Female who originally presented with acute metabolic encephalopathy has been  steadily improving does have sporadic dyspnea from her asthma/copd  - Patient today overall doing ok, comfortable, eating OK.     ---___---___---___---___---___---      <<<  MEDICATIONS:  >>>    MEDICATIONS  (STANDING):  buDESOnide 160 MICROgram(s)/formoterol 4.5 MICROgram(s) Inhaler 2 Puff(s) Inhalation two times a day  chlorhexidine 4% Liquid 1 Application(s) Topical <User Schedule>  clobetasol 0.05% Ointment 1 Application(s) Topical two times a day  clonazePAM Tablet 1.5 milliGRAM(s) Oral at bedtime  clopidogrel Tablet 75 milliGRAM(s) Oral daily  dextrose 5%. 1000 milliLiter(s) (50 mL/Hr) IV Continuous <Continuous>  dextrose 50% Injectable 12.5 Gram(s) IV Push once  dextrose 50% Injectable 25 Gram(s) IV Push once  dextrose 50% Injectable 25 Gram(s) IV Push once  ergocalciferol Drops 8000 Unit(s) Oral daily  fentaNYL   Patch  12 MICROgram(s)/Hr 1 Patch Transdermal every 72 hours  ferrous    sulfate Liquid 300 milliGRAM(s) Enteral Tube daily  gabapentin 300 milliGRAM(s) Oral every 8 hours  heparin  Injectable 5000 Unit(s) SubCutaneous every 12 hours  insulin lispro (HumaLOG) corrective regimen sliding scale   SubCutaneous every 6 hours  insulin NPH human recombinant 6 Unit(s) SubCutaneous before breakfast  lidocaine   Patch 1 Patch Transdermal daily  minocycline 100 milliGRAM(s) Oral two times a day  mirtazapine 7.5 milliGRAM(s) Oral at bedtime  mupirocin 2% Ointment 1 Application(s) Topical two times a day  nystatin    Suspension 035714 Unit(s) Oral two times a day  pantoprazole   Suspension 40 milliGRAM(s) Oral before breakfast  predniSONE   Tablet 7.5 milliGRAM(s) Oral daily  QUEtiapine 25 milliGRAM(s) Oral at bedtime  sertraline 50 milliGRAM(s) Oral daily  tiotropium 18 MICROgram(s) Capsule 1 Capsule(s) Inhalation daily  tiZANidine 4 milliGRAM(s) Oral <User Schedule>      MEDICATIONS  (PRN):  acetaminophen    Suspension .. 650 milliGRAM(s) Oral every 8 hours PRN Mild Pain (1 - 3)  ALBUTerol/ipratropium for Nebulization 3 milliLiter(s) Nebulizer every 6 hours PRN Shortness of Breath and/or Wheezing  dextrose 40% Gel 15 Gram(s) Oral once PRN Blood Glucose LESS THAN 70 milliGRAM(s)/deciliter  diphenhydrAMINE   Elixir 25 milliGRAM(s) Enteral Tube every 4 hours PRN Itching  glucagon  Injectable 1 milliGRAM(s) IntraMuscular once PRN Glucose LESS THAN 70 milligrams/deciliter  nystatin Powder 1 Application(s) Topical two times a day PRN rash/itchiness       ---___---___---___---___---___---     <<<REVIEW OF SYSTEM: >>>    GEN: no fever, no chills, no pain  RESP: no SOB, no cough, no sputum  CVS: no chest pain, no palpitations, no edema  GI: no abdominal pain, no nausea, no vomiting, no constipation, no diarrhea  : no dysurea, no frequency  NEURO: no headache, no dizziness  PSYCH: no depression, not anxious  Derm : no itching, no rash     ---___---___---___---___---___---          <<<  VITAL SIGNS: >>>    T(F): 98.2 (11-16-18 @ 12:00), Max: 98.6 (11-15-18 @ 21:19)  HR: 80 (11-16-18 @ 12:00) (80 - 97)  BP: 144/76 (11-16-18 @ 12:00) (136/82 - 146/75)  RR: 18 (11-16-18 @ 12:00) (18 - 18)  SpO2: 94% (11-16-18 @ 12:00) (92% - 94%)  Wt(kg): --  CAPILLARY BLOOD GLUCOSE      POCT Blood Glucose.: 168 mg/dL (16 Nov 2018 11:51)    I&O's Summary    15 Nov 2018 07:01  -  16 Nov 2018 07:00  --------------------------------------------------------  IN: 1090 mL / OUT: 1400 mL / NET: -310 mL    16 Nov 2018 07:01  -  16 Nov 2018 14:39  --------------------------------------------------------  IN: 350 mL / OUT: 400 mL / NET: -50 mL         ---___---___---___---___---___---                       PHYSICAL EXAM:    GEN: A&O X 3 , NAD , comfortable  HEENT: NCAT, PERRL, MMM, no scleral icterus, hearing intact  NECK: Supple, No JVD  CVS: S1S2 , regular , No M/R/G appreciated  PULM: CTA B/L,  no W/R/R appreciated  ABD.: soft. non tender, non distended,  bowel sounds present  Extrem: intact pulses , no edema noted  Derm: sacral decubitus noted   PSYCH: depression anxious     ---___---___---___---___---___---     <<<  LAB AND IMAGING: >>>                          9.9    10.2  )-----------( 312      ( 15 Nov 2018 06:26 )             29.4               11-15    132<L>  |  99  |  18  ----------------------------<  104<H>  3.7   |  20<L>  |  0.36<L>    Ca    8.6      15 Nov 2018 06:26  Mg     1.7     11-15                                 [All pertinent / recent available Imaging reports and other labs reviewed]     ---___---___---___---___---___---___ ---___---___---___---___---           <<<  A S S E S S M E N T   A N D   P L A N :  >>>          -GI/DVT Prophylaxis.    --------------------------------------------       >>______________________<<      Deniz Bolden .         phone   [phone]

## 2018-11-16 NOTE — PROGRESS NOTE ADULT - SUBJECTIVE AND OBJECTIVE BOX
INTERVAL HPI/OVERNIGHT EVENTS: Tolerating feeds, no vomiting or abd pain     MEDICATIONS  (STANDING):  buDESOnide 160 MICROgram(s)/formoterol 4.5 MICROgram(s) Inhaler 2 Puff(s) Inhalation two times a day  chlorhexidine 4% Liquid 1 Application(s) Topical <User Schedule>  clobetasol 0.05% Ointment 1 Application(s) Topical two times a day  clonazePAM Tablet 1.5 milliGRAM(s) Oral at bedtime  clopidogrel Tablet 75 milliGRAM(s) Oral daily  dextrose 5%. 1000 milliLiter(s) (50 mL/Hr) IV Continuous <Continuous>  dextrose 50% Injectable 12.5 Gram(s) IV Push once  dextrose 50% Injectable 25 Gram(s) IV Push once  dextrose 50% Injectable 25 Gram(s) IV Push once  ergocalciferol Drops 8000 Unit(s) Oral daily  fentaNYL   Patch  12 MICROgram(s)/Hr 1 Patch Transdermal every 72 hours  ferrous    sulfate Liquid 300 milliGRAM(s) Enteral Tube daily  gabapentin 300 milliGRAM(s) Oral every 8 hours  heparin  Injectable 5000 Unit(s) SubCutaneous every 12 hours  insulin lispro (HumaLOG) corrective regimen sliding scale   SubCutaneous every 6 hours  insulin NPH human recombinant 6 Unit(s) SubCutaneous before breakfast  lidocaine   Patch 1 Patch Transdermal daily  minocycline 100 milliGRAM(s) Oral two times a day  mirtazapine 7.5 milliGRAM(s) Oral at bedtime  mupirocin 2% Ointment 1 Application(s) Topical two times a day  nystatin    Suspension [id number] Unit(s) Oral two times a day  pantoprazole   Suspension 40 milliGRAM(s) Oral before breakfast  predniSONE   Tablet 7.5 milliGRAM(s) Oral daily  QUEtiapine 25 milliGRAM(s) Oral at bedtime  sertraline 50 milliGRAM(s) Oral daily  tiotropium 18 MICROgram(s) Capsule 1 Capsule(s) Inhalation daily  tiZANidine 4 milliGRAM(s) Oral <User Schedule>    MEDICATIONS  (PRN):  acetaminophen    Suspension .. 650 milliGRAM(s) Oral every 8 hours PRN Mild Pain (1 - 3)  ALBUTerol/ipratropium for Nebulization 3 milliLiter(s) Nebulizer every 6 hours PRN Shortness of Breath and/or Wheezing  dextrose 40% Gel 15 Gram(s) Oral once PRN Blood Glucose LESS THAN 70 milliGRAM(s)/deciliter  diphenhydrAMINE   Elixir 25 milliGRAM(s) Enteral Tube every 4 hours PRN Itching  glucagon  Injectable 1 milliGRAM(s) IntraMuscular once PRN Glucose LESS THAN 70 milligrams/deciliter  nystatin Powder 1 Application(s) Topical two times a day PRN rash/itchiness      Allergies    ASA; dye contrast (Anaphylaxis)  aspirin (Short breath)  divalproex sodium (Other (Unknown))  Haldol (Other (Unknown))  penicillin (Short breath; Rash)  sulfa drugs (Short breath; Rash)  Xanax (Other (Unknown))    Intolerances            PHYSICAL EXAM:   Vital Signs:  Vital Signs Last 24 Hrs  T(C): 36.5 (16 Nov 2018 06:03), Max: 37 (15 Nov 2018 21:19)  T(F): 97.7 (16 Nov 2018 06:03), Max: 98.6 (15 Nov 2018 21:19)  HR: 82 (16 Nov 2018 06:03) (82 - 97)  BP: 136/82 (16 Nov 2018 06:03) (132/84 - 146/75)  BP(mean): --  RR: 18 (16 Nov 2018 06:03) (18 - 18)  SpO2: 92% (16 Nov 2018 06:03) (92% - 97%)  Daily     Daily     GENERAL:  no distress  HEENT:  NC/AT,  anicteric  CHEST:   no increased effort, breath sounds clear  HEART:  Regular rhythm  ABDOMEN:  Soft, non-tender, non-distended, normoactive bowel sounds,  no masses ,no hepato-splenomegaly, no signs of chronic liver disease  EXTEREMITIES:  no cyanosis      LABS:                        9.9    10.2  )-----------( 312      ( 15 Nov 2018 06:26 )             29.4     11-15    132<L>  |  99  |  18  ----------------------------<  104<H>  3.7   |  20<L>  |  0.36<L>    Ca    8.6      15 Nov 2018 06:26  Mg     1.7     11-15            RADIOLOGY & ADDITIONAL TESTS:

## 2018-11-17 LAB
ANION GAP SERPL CALC-SCNC: 14 MMOL/L — SIGNIFICANT CHANGE UP (ref 5–17)
BUN SERPL-MCNC: 18 MG/DL — SIGNIFICANT CHANGE UP (ref 7–23)
CALCIUM SERPL-MCNC: 8.6 MG/DL — SIGNIFICANT CHANGE UP (ref 8.4–10.5)
CHLORIDE SERPL-SCNC: 96 MMOL/L — SIGNIFICANT CHANGE UP (ref 96–108)
CO2 SERPL-SCNC: 22 MMOL/L — SIGNIFICANT CHANGE UP (ref 22–31)
CREAT SERPL-MCNC: 0.37 MG/DL — LOW (ref 0.5–1.3)
GLUCOSE BLDC GLUCOMTR-MCNC: 109 MG/DL — HIGH (ref 70–99)
GLUCOSE BLDC GLUCOMTR-MCNC: 114 MG/DL — HIGH (ref 70–99)
GLUCOSE BLDC GLUCOMTR-MCNC: 118 MG/DL — HIGH (ref 70–99)
GLUCOSE BLDC GLUCOMTR-MCNC: 120 MG/DL — HIGH (ref 70–99)
GLUCOSE SERPL-MCNC: 100 MG/DL — HIGH (ref 70–99)
POTASSIUM SERPL-MCNC: 3.4 MMOL/L — LOW (ref 3.5–5.3)
POTASSIUM SERPL-SCNC: 3.4 MMOL/L — LOW (ref 3.5–5.3)
SODIUM SERPL-SCNC: 132 MMOL/L — LOW (ref 135–145)

## 2018-11-17 PROCEDURE — 99232 SBSQ HOSP IP/OBS MODERATE 35: CPT

## 2018-11-17 PROCEDURE — 71250 CT THORAX DX C-: CPT | Mod: 26

## 2018-11-17 RX ORDER — POTASSIUM CHLORIDE 20 MEQ
40 PACKET (EA) ORAL ONCE
Qty: 0 | Refills: 0 | Status: COMPLETED | OUTPATIENT
Start: 2018-11-17 | End: 2018-11-17

## 2018-11-17 RX ORDER — HYDROMORPHONE HYDROCHLORIDE 2 MG/ML
2 INJECTION INTRAMUSCULAR; INTRAVENOUS; SUBCUTANEOUS EVERY 4 HOURS
Qty: 0 | Refills: 0 | Status: DISCONTINUED | OUTPATIENT
Start: 2018-11-17 | End: 2018-11-19

## 2018-11-17 RX ADMIN — Medication 3 MILLILITER(S): at 11:28

## 2018-11-17 RX ADMIN — HEPARIN SODIUM 5000 UNIT(S): 5000 INJECTION INTRAVENOUS; SUBCUTANEOUS at 06:28

## 2018-11-17 RX ADMIN — FENTANYL CITRATE 1 PATCH: 50 INJECTION INTRAVENOUS at 19:33

## 2018-11-17 RX ADMIN — MINOCYCLINE HYDROCHLORIDE 100 MILLIGRAM(S): 45 TABLET, EXTENDED RELEASE ORAL at 18:22

## 2018-11-17 RX ADMIN — Medication 7.5 MILLIGRAM(S): at 06:27

## 2018-11-17 RX ADMIN — GABAPENTIN 300 MILLIGRAM(S): 400 CAPSULE ORAL at 06:28

## 2018-11-17 RX ADMIN — PANTOPRAZOLE SODIUM 40 MILLIGRAM(S): 20 TABLET, DELAYED RELEASE ORAL at 06:27

## 2018-11-17 RX ADMIN — Medication 40 MILLIEQUIVALENT(S): at 18:21

## 2018-11-17 RX ADMIN — HYDROMORPHONE HYDROCHLORIDE 2 MILLIGRAM(S): 2 INJECTION INTRAMUSCULAR; INTRAVENOUS; SUBCUTANEOUS at 16:57

## 2018-11-17 RX ADMIN — HEPARIN SODIUM 5000 UNIT(S): 5000 INJECTION INTRAVENOUS; SUBCUTANEOUS at 18:22

## 2018-11-17 RX ADMIN — Medication 1 APPLICATION(S): at 18:23

## 2018-11-17 RX ADMIN — HYDROMORPHONE HYDROCHLORIDE 2 MILLIGRAM(S): 2 INJECTION INTRAMUSCULAR; INTRAVENOUS; SUBCUTANEOUS at 17:15

## 2018-11-17 RX ADMIN — BUDESONIDE AND FORMOTEROL FUMARATE DIHYDRATE 2 PUFF(S): 160; 4.5 AEROSOL RESPIRATORY (INHALATION) at 18:22

## 2018-11-17 RX ADMIN — MIRTAZAPINE 7.5 MILLIGRAM(S): 45 TABLET, ORALLY DISINTEGRATING ORAL at 22:24

## 2018-11-17 RX ADMIN — ERGOCALCIFEROL 8000 UNIT(S): 1.25 CAPSULE ORAL at 13:42

## 2018-11-17 RX ADMIN — LIDOCAINE 1 PATCH: 4 CREAM TOPICAL at 22:43

## 2018-11-17 RX ADMIN — LIDOCAINE 1 PATCH: 4 CREAM TOPICAL at 11:28

## 2018-11-17 RX ADMIN — Medication 1.5 MILLIGRAM(S): at 22:24

## 2018-11-17 RX ADMIN — FENTANYL CITRATE 1 PATCH: 50 INJECTION INTRAVENOUS at 07:40

## 2018-11-17 RX ADMIN — SERTRALINE 50 MILLIGRAM(S): 25 TABLET, FILM COATED ORAL at 13:01

## 2018-11-17 RX ADMIN — CHLORHEXIDINE GLUCONATE 1 APPLICATION(S): 213 SOLUTION TOPICAL at 11:29

## 2018-11-17 RX ADMIN — Medication 1 APPLICATION(S): at 06:28

## 2018-11-17 RX ADMIN — GABAPENTIN 300 MILLIGRAM(S): 400 CAPSULE ORAL at 22:24

## 2018-11-17 RX ADMIN — Medication 500000 UNIT(S): at 06:28

## 2018-11-17 RX ADMIN — MUPIROCIN 1 APPLICATION(S): 20 OINTMENT TOPICAL at 18:23

## 2018-11-17 RX ADMIN — QUETIAPINE FUMARATE 25 MILLIGRAM(S): 200 TABLET, FILM COATED ORAL at 22:24

## 2018-11-17 RX ADMIN — MUPIROCIN 1 APPLICATION(S): 20 OINTMENT TOPICAL at 06:29

## 2018-11-17 RX ADMIN — TIZANIDINE 4 MILLIGRAM(S): 4 TABLET ORAL at 21:22

## 2018-11-17 RX ADMIN — Medication 300 MILLIGRAM(S): at 13:00

## 2018-11-17 RX ADMIN — MINOCYCLINE HYDROCHLORIDE 100 MILLIGRAM(S): 45 TABLET, EXTENDED RELEASE ORAL at 06:28

## 2018-11-17 RX ADMIN — Medication 500000 UNIT(S): at 18:24

## 2018-11-17 RX ADMIN — Medication 2 MILLIGRAM(S): at 19:26

## 2018-11-17 RX ADMIN — TIOTROPIUM BROMIDE 1 CAPSULE(S): 18 CAPSULE ORAL; RESPIRATORY (INHALATION) at 13:01

## 2018-11-17 RX ADMIN — GABAPENTIN 300 MILLIGRAM(S): 400 CAPSULE ORAL at 13:01

## 2018-11-17 RX ADMIN — CLOPIDOGREL BISULFATE 75 MILLIGRAM(S): 75 TABLET, FILM COATED ORAL at 13:01

## 2018-11-17 RX ADMIN — TIZANIDINE 4 MILLIGRAM(S): 4 TABLET ORAL at 09:47

## 2018-11-17 RX ADMIN — BUDESONIDE AND FORMOTEROL FUMARATE DIHYDRATE 2 PUFF(S): 160; 4.5 AEROSOL RESPIRATORY (INHALATION) at 06:28

## 2018-11-17 RX ADMIN — HUMAN INSULIN 6 UNIT(S): 100 INJECTION, SUSPENSION SUBCUTANEOUS at 06:43

## 2018-11-17 RX ADMIN — LIDOCAINE 1 PATCH: 4 CREAM TOPICAL at 19:33

## 2018-11-17 NOTE — PROGRESS NOTE ADULT - ASSESSMENT
dyspnea  order doppler and ct scan of the chest   metabolic encephalopathy  monitor clinically   asthma exacerbation continue budesonide   chronic pain  on medical marijuana and dilaudi   sacral decubitus wound care to continue treatment   hyperglycemia secondary to medication

## 2018-11-17 NOTE — PROGRESS NOTE ADULT - SUBJECTIVE AND OBJECTIVE BOX
PULMONARY PROGRESS NOTE    MYRIAM HEATH  MRN-[id number]    Patient is a 68y old  Female who presents with a chief complaint of lethargy (17 Nov 2018 08:15)      HPI:  Patient presently resenting comfortably.  notes tachypnea when she is moved. No significant cough  -    ROS:   -    ACTIVE MEDICATION LIST:  MEDICATIONS  (STANDING):  buDESOnide 160 MICROgram(s)/formoterol 4.5 MICROgram(s) Inhaler 2 Puff(s) Inhalation two times a day  chlorhexidine 4% Liquid 1 Application(s) Topical <User Schedule>  clobetasol 0.05% Ointment 1 Application(s) Topical two times a day  clonazePAM Tablet 1.5 milliGRAM(s) Oral at bedtime  clopidogrel Tablet 75 milliGRAM(s) Oral daily  dextrose 5%. 1000 milliLiter(s) (50 mL/Hr) IV Continuous <Continuous>  dextrose 50% Injectable 12.5 Gram(s) IV Push once  dextrose 50% Injectable 25 Gram(s) IV Push once  dextrose 50% Injectable 25 Gram(s) IV Push once  ergocalciferol Drops 8000 Unit(s) Oral daily  fentaNYL   Patch  12 MICROgram(s)/Hr 1 Patch Transdermal every 72 hours  ferrous    sulfate Liquid 300 milliGRAM(s) Enteral Tube daily  gabapentin 300 milliGRAM(s) Oral every 8 hours  heparin  Injectable 5000 Unit(s) SubCutaneous every 12 hours  insulin lispro (HumaLOG) corrective regimen sliding scale   SubCutaneous every 6 hours  insulin NPH human recombinant 6 Unit(s) SubCutaneous before breakfast  lidocaine   Patch 1 Patch Transdermal daily  medical marijuana oil 1 milliLiter(s),medical marijuana oil 1 milliLiter(s) 1 milliLiter(s) SubLingual <User Schedule>  minocycline 100 milliGRAM(s) Oral two times a day  mirtazapine 7.5 milliGRAM(s) Oral at bedtime  mupirocin 2% Ointment 1 Application(s) Topical two times a day  nystatin    Suspension [id number] Unit(s) Oral two times a day  pantoprazole   Suspension 40 milliGRAM(s) Oral before breakfast  predniSONE   Tablet 7.5 milliGRAM(s) Oral daily  QUEtiapine 25 milliGRAM(s) Oral at bedtime  sertraline 50 milliGRAM(s) Oral daily  tiotropium 18 MICROgram(s) Capsule 1 Capsule(s) Inhalation daily  tiZANidine 4 milliGRAM(s) Oral <User Schedule>    MEDICATIONS  (PRN):  acetaminophen    Suspension .. 650 milliGRAM(s) Oral every 8 hours PRN Mild Pain (1 - 3)  ALBUTerol/ipratropium for Nebulization 3 milliLiter(s) Nebulizer every 6 hours PRN Shortness of Breath and/or Wheezing  dextrose 40% Gel 15 Gram(s) Oral once PRN Blood Glucose LESS THAN 70 milliGRAM(s)/deciliter  diphenhydrAMINE   Elixir 25 milliGRAM(s) Enteral Tube every 4 hours PRN Itching  glucagon  Injectable 1 milliGRAM(s) IntraMuscular once PRN Glucose LESS THAN 70 milligrams/deciliter  nystatin Powder 1 Application(s) Topical two times a day PRN rash/itchiness      EXAM:  Vital Signs Last 24 Hrs  T(C): 36.6 (17 Nov 2018 05:28), Max: 36.8 (16 Nov 2018 12:00)  T(F): 97.9 (17 Nov 2018 05:28), Max: 98.2 (16 Nov 2018 12:00)  HR: 85 (17 Nov 2018 05:28) (80 - 95)  BP: 135/76 (17 Nov 2018 05:28) (135/76 - 147/84)  BP(mean): --  RR: 18 (17 Nov 2018 05:28) (18 - 18)  SpO2: 100% (17 Nov 2018 05:28) (94% - 100%)    GENERAL: The patient is resting comfortably without significant tachypnea.  RR 22    LUNGS: Fair air entry bilat. Occ wheeze.    HEART: Regular rate and rhythm without murmur.  Abd. soft bs pos.  Trace edema  LABS/IMAGING: reviewed      PROBLEM LIST:  68y Female with HEALTH ISSUES - PROBLEM Dx:  IGT (impaired glucose tolerance): IGT (impaired glucose tolerance)  PNA (pneumonia): PNA (pneumonia)  CVA (cerebral vascular accident): CVA (cerebral vascular accident)  Hyponatremia: Hyponatremia  Cerebrovascular accident (CVA), unspecified mechanism: Cerebrovascular accident (CVA), unspecified mechanism  Mild asthma without complication, unspecified whether persistent: Mild asthma without complication, unspecified whether persistent  Cystitis: Cystitis  Encephalopathy acute: Encephalopathy acute  Acute hip pain, right: Acute hip pain, right  Other ulcerative colitis without complication: Other ulcerative colitis without complication  Depression: Depression  Anxiety: Anxiety  Encephalopathy, metabolic: Encephalopathy, metabolic  Altered mental state: Altered mental state  Pulmonary HTN: Pulmonary HTN  Aspiration pneumonia of left lower lobe, unspecified aspiration pneumonia type: Aspiration pneumonia of left lower lobe, unspecified aspiration pneumonia type            RECS:  Continue present therapy/bronchodilator therapy.  Follow clinical/O2 sat.   As per medicine.    Reagan Plasencia MD   [phone]

## 2018-11-17 NOTE — PROGRESS NOTE ADULT - SUBJECTIVE AND OBJECTIVE BOX
---___---___---___---___---___---___ ---___---___---___---___---___---___---___---___---___---                    <<<  M E D I C A L   A T T E N D I N G    F O L L O W    U P   N O T E  >>>    - Patient seen and examined by me approximately thirty minutes ago.  - In summary, patient is a 68y year old Female who origianlly presented with ams and also now has been having dyspnea  - Patient today overall doing ok, comfortable, eating OK.     ---___---___---___---___---___---      <<<  MEDICATIONS:  >>>    MEDICATIONS  (STANDING):  buDESOnide 160 MICROgram(s)/formoterol 4.5 MICROgram(s) Inhaler 2 Puff(s) Inhalation two times a day  chlorhexidine 4% Liquid 1 Application(s) Topical <User Schedule>  clobetasol 0.05% Ointment 1 Application(s) Topical two times a day  clonazePAM Tablet 1.5 milliGRAM(s) Oral at bedtime  clopidogrel Tablet 75 milliGRAM(s) Oral daily  dextrose 5%. 1000 milliLiter(s) (50 mL/Hr) IV Continuous <Continuous>  dextrose 50% Injectable 12.5 Gram(s) IV Push once  dextrose 50% Injectable 25 Gram(s) IV Push once  dextrose 50% Injectable 25 Gram(s) IV Push once  ergocalciferol Drops 8000 Unit(s) Oral daily  fentaNYL   Patch  12 MICROgram(s)/Hr 1 Patch Transdermal every 72 hours  ferrous    sulfate Liquid 300 milliGRAM(s) Enteral Tube daily  gabapentin 300 milliGRAM(s) Oral every 8 hours  heparin  Injectable 5000 Unit(s) SubCutaneous every 12 hours  insulin lispro (HumaLOG) corrective regimen sliding scale   SubCutaneous every 6 hours  insulin NPH human recombinant 6 Unit(s) SubCutaneous before breakfast  lidocaine   Patch 1 Patch Transdermal daily  medical marijuana oil 1 milliLiter(s),medical marijuana oil 1 milliLiter(s) 1 milliLiter(s) SubLingual <User Schedule>  minocycline 100 milliGRAM(s) Oral two times a day  mirtazapine 7.5 milliGRAM(s) Oral at bedtime  mupirocin 2% Ointment 1 Application(s) Topical two times a day  nystatin    Suspension 023980 Unit(s) Oral two times a day  pantoprazole   Suspension 40 milliGRAM(s) Oral before breakfast  predniSONE   Tablet 7.5 milliGRAM(s) Oral daily  QUEtiapine 25 milliGRAM(s) Oral at bedtime  sertraline 50 milliGRAM(s) Oral daily  tiotropium 18 MICROgram(s) Capsule 1 Capsule(s) Inhalation daily  tiZANidine 4 milliGRAM(s) Oral <User Schedule>      MEDICATIONS  (PRN):  acetaminophen    Suspension .. 650 milliGRAM(s) Oral every 8 hours PRN Mild Pain (1 - 3)  ALBUTerol/ipratropium for Nebulization 3 milliLiter(s) Nebulizer every 6 hours PRN Shortness of Breath and/or Wheezing  dextrose 40% Gel 15 Gram(s) Oral once PRN Blood Glucose LESS THAN 70 milliGRAM(s)/deciliter  diphenhydrAMINE   Elixir 25 milliGRAM(s) Enteral Tube every 4 hours PRN Itching  glucagon  Injectable 1 milliGRAM(s) IntraMuscular once PRN Glucose LESS THAN 70 milligrams/deciliter  HYDROmorphone   Tablet 2 milliGRAM(s) Oral every 4 hours PRN Severe Pain (7 - 10)  nystatin Powder 1 Application(s) Topical two times a day PRN rash/itchiness       ---___---___---___---___---___---     <<<REVIEW OF SYSTEM: >>>    GEN: no fever, no chills, no pain  RESP:  SOB, no cough, no sputum  CVS: no chest pain, no palpitations, no edema  GI: no abdominal pain, no nausea, no vomiting, no constipation, no diarrhea  : no dysurea, no frequency  NEURO: no headache, no dizziness  PSYCH: no depression, not anxious  Derm : no itching, no rash     ---___---___---___---___---___---          <<<  VITAL SIGNS: >>>    T(F): 98.6 (11-17-18 @ 14:39), Max: 98.6 (11-17-18 @ 14:39)  HR: 90 (11-17-18 @ 14:39) (85 - 95)  BP: 131/74 (11-17-18 @ 14:39) (131/74 - 147/84)  RR: 18 (11-17-18 @ 14:39) (18 - 18)  SpO2: 100% (11-17-18 @ 14:39) (96% - 100%)  Wt(kg): --  CAPILLARY BLOOD GLUCOSE      POCT Blood Glucose.: 109 mg/dL (17 Nov 2018 12:10)    I&O's Summary    16 Nov 2018 07:01  -  17 Nov 2018 07:00  --------------------------------------------------------  IN: 1340 mL / OUT: 900 mL / NET: 440 mL    17 Nov 2018 07:01  -  17 Nov 2018 16:57  --------------------------------------------------------  IN: 0 mL / OUT: 450 mL / NET: -450 mL         ---___---___---___---___---___---                       PHYSICAL EXAM:    GEN: A&O X 3 , NAD , comfortable  HEENT: NCAT, PERRL, MMM, no scleral icterus, hearing intact  NECK: Supple, No JVD  CVS: S1S2 , regular , No M/R/G appreciated  PULM: CTA B/L,  no W/R/R appreciated  ABD.: soft. non tender, non distended,  bowel sounds present (+) peg   Extrem: intact pulses , no edema noted  Derm: No rash or ecchymosis noted sacral decubitus noted   PSYCH: normal mood, no depression, not anxious     ---___---___---___---___---___---     <<<  LAB AND IMAGING: >>>                11-17    132<L>  |  96  |  18  ----------------------------<  100<H>  3.4<L>   |  22  |  0.37<L>    Ca    8.6      17 Nov 2018 06:51                                 [All pertinent / recent available Imaging reports and other labs reviewed]     ---___---___---___---___---___---___ ---___---___---___---___---           <<<  A S S E S S M E N T   A N D   P L A N :  >>>          -GI/DVT Prophylaxis.    --------------------------------------------  Case discussed with   Education given on     >>______________________<<      Deniz Bolden .         phone   [phone]

## 2018-11-17 NOTE — PROGRESS NOTE ADULT - SUBJECTIVE AND OBJECTIVE BOX
MYRIAM WHITE:[id number],   68yFemale followed for:  ASA; dye contrast (Anaphylaxis)  aspirin (Short breath)  divalproex sodium (Other (Unknown))  Haldol (Other (Unknown))  penicillin (Short breath; Rash)  sulfa drugs (Short breath; Rash)  Xanax (Other (Unknown))    PAST MEDICAL & SURGICAL HISTORY:  CVA (cerebral vascular accident)  Fatty pancreas  PNA (pneumonia)  Pulmonary HTN  IGT (impaired glucose tolerance)  Ulcerative colitis  Acid reflux  Anxiety  Depression  Mouth sores  HLD (hyperlipidemia)  Asthma  Humeral head fracture  H/O: hysterectomy  H/O cataract extraction, left  History of knee replacement    FAMILY HISTORY:  Family history of dementia (Grandparent)  Family history of colon cancer (Grandparent)    MEDICATIONS  (STANDING):  buDESOnide 160 MICROgram(s)/formoterol 4.5 MICROgram(s) Inhaler 2 Puff(s) Inhalation two times a day  chlorhexidine 4% Liquid 1 Application(s) Topical <User Schedule>  clobetasol 0.05% Ointment 1 Application(s) Topical two times a day  clonazePAM Tablet 1.5 milliGRAM(s) Oral at bedtime  clopidogrel Tablet 75 milliGRAM(s) Oral daily  dextrose 5%. 1000 milliLiter(s) (50 mL/Hr) IV Continuous <Continuous>  dextrose 50% Injectable 12.5 Gram(s) IV Push once  dextrose 50% Injectable 25 Gram(s) IV Push once  dextrose 50% Injectable 25 Gram(s) IV Push once  ergocalciferol Drops 8000 Unit(s) Oral daily  fentaNYL   Patch  12 MICROgram(s)/Hr 1 Patch Transdermal every 72 hours  ferrous    sulfate Liquid 300 milliGRAM(s) Enteral Tube daily  gabapentin 300 milliGRAM(s) Oral every 8 hours  heparin  Injectable 5000 Unit(s) SubCutaneous every 12 hours  insulin lispro (HumaLOG) corrective regimen sliding scale   SubCutaneous every 6 hours  insulin NPH human recombinant 6 Unit(s) SubCutaneous before breakfast  lidocaine   Patch 1 Patch Transdermal daily  medical marijuana oil 1 milliLiter(s),medical marijuana oil 1 milliLiter(s) 1 milliLiter(s) SubLingual <User Schedule>  minocycline 100 milliGRAM(s) Oral two times a day  mirtazapine 7.5 milliGRAM(s) Oral at bedtime  mupirocin 2% Ointment 1 Application(s) Topical two times a day  nystatin    Suspension 601332 Unit(s) Oral two times a day  pantoprazole   Suspension 40 milliGRAM(s) Oral before breakfast  predniSONE   Tablet 7.5 milliGRAM(s) Oral daily  QUEtiapine 25 milliGRAM(s) Oral at bedtime  sertraline 50 milliGRAM(s) Oral daily  tiotropium 18 MICROgram(s) Capsule 1 Capsule(s) Inhalation daily  tiZANidine 4 milliGRAM(s) Oral <User Schedule>    MEDICATIONS  (PRN):  acetaminophen    Suspension .. 650 milliGRAM(s) Oral every 8 hours PRN Mild Pain (1 - 3)  ALBUTerol/ipratropium for Nebulization 3 milliLiter(s) Nebulizer every 6 hours PRN Shortness of Breath and/or Wheezing  dextrose 40% Gel 15 Gram(s) Oral once PRN Blood Glucose LESS THAN 70 milliGRAM(s)/deciliter  diphenhydrAMINE   Elixir 25 milliGRAM(s) Enteral Tube every 4 hours PRN Itching  glucagon  Injectable 1 milliGRAM(s) IntraMuscular once PRN Glucose LESS THAN 70 milligrams/deciliter  nystatin Powder 1 Application(s) Topical two times a day PRN rash/itchiness      Vital Signs Last 24 Hrs  T(C): 36.6 (17 Nov 2018 05:28), Max: 36.8 (16 Nov 2018 12:00)  T(F): 97.9 (17 Nov 2018 05:28), Max: 98.2 (16 Nov 2018 12:00)  HR: 85 (17 Nov 2018 05:28) (80 - 95)  BP: 135/76 (17 Nov 2018 05:28) (135/76 - 147/84)  BP(mean): --  RR: 18 (17 Nov 2018 05:28) (18 - 18)  SpO2: 100% (17 Nov 2018 05:28) (94% - 100%)  nc/at  s1s2  cta  soft, nt, nd no guarding or rebound  no c/c/e      11-17    132<L>  |  96  |  18  ----------------------------<  100<H>  3.4<L>   |  22  |  0.37<L>    Ca    8.6      17 Nov 2018 06:51

## 2018-11-18 LAB
ANION GAP SERPL CALC-SCNC: 14 MMOL/L — SIGNIFICANT CHANGE UP (ref 5–17)
BUN SERPL-MCNC: 20 MG/DL — SIGNIFICANT CHANGE UP (ref 7–23)
CALCIUM SERPL-MCNC: 8.9 MG/DL — SIGNIFICANT CHANGE UP (ref 8.4–10.5)
CHLORIDE SERPL-SCNC: 94 MMOL/L — LOW (ref 96–108)
CO2 SERPL-SCNC: 23 MMOL/L — SIGNIFICANT CHANGE UP (ref 22–31)
CREAT SERPL-MCNC: 0.37 MG/DL — LOW (ref 0.5–1.3)
GLUCOSE BLDC GLUCOMTR-MCNC: 120 MG/DL — HIGH (ref 70–99)
GLUCOSE BLDC GLUCOMTR-MCNC: 142 MG/DL — HIGH (ref 70–99)
GLUCOSE BLDC GLUCOMTR-MCNC: 144 MG/DL — HIGH (ref 70–99)
GLUCOSE BLDC GLUCOMTR-MCNC: 149 MG/DL — HIGH (ref 70–99)
GLUCOSE SERPL-MCNC: 128 MG/DL — HIGH (ref 70–99)
MAGNESIUM SERPL-MCNC: 1.4 MG/DL — LOW (ref 1.6–2.6)
POTASSIUM SERPL-MCNC: 4 MMOL/L — SIGNIFICANT CHANGE UP (ref 3.5–5.3)
POTASSIUM SERPL-SCNC: 4 MMOL/L — SIGNIFICANT CHANGE UP (ref 3.5–5.3)
SODIUM SERPL-SCNC: 131 MMOL/L — LOW (ref 135–145)

## 2018-11-18 PROCEDURE — 99232 SBSQ HOSP IP/OBS MODERATE 35: CPT

## 2018-11-18 PROCEDURE — 93970 EXTREMITY STUDY: CPT | Mod: 26

## 2018-11-18 RX ORDER — TRIAMTERENE 100 MG/1
50 CAPSULE ORAL ONCE
Qty: 0 | Refills: 0 | Status: DISCONTINUED | OUTPATIENT
Start: 2018-11-18 | End: 2018-11-18

## 2018-11-18 RX ORDER — ETHACRYNIC ACID 25 MG/1
25 TABLET ORAL ONCE
Qty: 0 | Refills: 0 | Status: COMPLETED | OUTPATIENT
Start: 2018-11-18 | End: 2018-11-18

## 2018-11-18 RX ADMIN — Medication 500000 UNIT(S): at 18:03

## 2018-11-18 RX ADMIN — ETHACRYNIC ACID 25 MILLIGRAM(S): 25 TABLET ORAL at 16:33

## 2018-11-18 RX ADMIN — HEPARIN SODIUM 5000 UNIT(S): 5000 INJECTION INTRAVENOUS; SUBCUTANEOUS at 18:02

## 2018-11-18 RX ADMIN — QUETIAPINE FUMARATE 25 MILLIGRAM(S): 200 TABLET, FILM COATED ORAL at 22:33

## 2018-11-18 RX ADMIN — LIDOCAINE 1 PATCH: 4 CREAM TOPICAL at 13:37

## 2018-11-18 RX ADMIN — BUDESONIDE AND FORMOTEROL FUMARATE DIHYDRATE 2 PUFF(S): 160; 4.5 AEROSOL RESPIRATORY (INHALATION) at 06:31

## 2018-11-18 RX ADMIN — HUMAN INSULIN 6 UNIT(S): 100 INJECTION, SUSPENSION SUBCUTANEOUS at 06:32

## 2018-11-18 RX ADMIN — Medication 3 MILLILITER(S): at 22:33

## 2018-11-18 RX ADMIN — FENTANYL CITRATE 1 PATCH: 50 INJECTION INTRAVENOUS at 19:31

## 2018-11-18 RX ADMIN — SERTRALINE 50 MILLIGRAM(S): 25 TABLET, FILM COATED ORAL at 13:24

## 2018-11-18 RX ADMIN — GABAPENTIN 300 MILLIGRAM(S): 400 CAPSULE ORAL at 06:31

## 2018-11-18 RX ADMIN — MUPIROCIN 1 APPLICATION(S): 20 OINTMENT TOPICAL at 18:03

## 2018-11-18 RX ADMIN — LIDOCAINE 1 PATCH: 4 CREAM TOPICAL at 19:32

## 2018-11-18 RX ADMIN — Medication 3 MILLILITER(S): at 06:49

## 2018-11-18 RX ADMIN — TIZANIDINE 4 MILLIGRAM(S): 4 TABLET ORAL at 21:48

## 2018-11-18 RX ADMIN — MINOCYCLINE HYDROCHLORIDE 100 MILLIGRAM(S): 45 TABLET, EXTENDED RELEASE ORAL at 18:02

## 2018-11-18 RX ADMIN — MINOCYCLINE HYDROCHLORIDE 100 MILLIGRAM(S): 45 TABLET, EXTENDED RELEASE ORAL at 06:31

## 2018-11-18 RX ADMIN — HEPARIN SODIUM 5000 UNIT(S): 5000 INJECTION INTRAVENOUS; SUBCUTANEOUS at 06:31

## 2018-11-18 RX ADMIN — ERGOCALCIFEROL 8000 UNIT(S): 1.25 CAPSULE ORAL at 13:24

## 2018-11-18 RX ADMIN — GABAPENTIN 300 MILLIGRAM(S): 400 CAPSULE ORAL at 22:33

## 2018-11-18 RX ADMIN — PANTOPRAZOLE SODIUM 40 MILLIGRAM(S): 20 TABLET, DELAYED RELEASE ORAL at 06:30

## 2018-11-18 RX ADMIN — Medication 1 APPLICATION(S): at 06:31

## 2018-11-18 RX ADMIN — MIRTAZAPINE 7.5 MILLIGRAM(S): 45 TABLET, ORALLY DISINTEGRATING ORAL at 22:33

## 2018-11-18 RX ADMIN — GABAPENTIN 300 MILLIGRAM(S): 400 CAPSULE ORAL at 13:24

## 2018-11-18 RX ADMIN — FENTANYL CITRATE 1 PATCH: 50 INJECTION INTRAVENOUS at 00:30

## 2018-11-18 RX ADMIN — Medication 300 MILLIGRAM(S): at 13:24

## 2018-11-18 RX ADMIN — Medication 7.5 MILLIGRAM(S): at 06:31

## 2018-11-18 RX ADMIN — BUDESONIDE AND FORMOTEROL FUMARATE DIHYDRATE 2 PUFF(S): 160; 4.5 AEROSOL RESPIRATORY (INHALATION) at 17:12

## 2018-11-18 RX ADMIN — Medication 500000 UNIT(S): at 06:30

## 2018-11-18 RX ADMIN — CHLORHEXIDINE GLUCONATE 1 APPLICATION(S): 213 SOLUTION TOPICAL at 12:00

## 2018-11-18 RX ADMIN — Medication 1.5 MILLIGRAM(S): at 22:33

## 2018-11-18 RX ADMIN — TIZANIDINE 4 MILLIGRAM(S): 4 TABLET ORAL at 10:31

## 2018-11-18 RX ADMIN — MUPIROCIN 1 APPLICATION(S): 20 OINTMENT TOPICAL at 06:31

## 2018-11-18 RX ADMIN — CLOPIDOGREL BISULFATE 75 MILLIGRAM(S): 75 TABLET, FILM COATED ORAL at 13:24

## 2018-11-18 RX ADMIN — Medication 1 APPLICATION(S): at 18:03

## 2018-11-18 RX ADMIN — TIOTROPIUM BROMIDE 1 CAPSULE(S): 18 CAPSULE ORAL; RESPIRATORY (INHALATION) at 13:24

## 2018-11-18 RX ADMIN — FENTANYL CITRATE 1 PATCH: 50 INJECTION INTRAVENOUS at 00:44

## 2018-11-18 RX ADMIN — Medication 3 MILLILITER(S): at 15:08

## 2018-11-18 RX ADMIN — FENTANYL CITRATE 1 PATCH: 50 INJECTION INTRAVENOUS at 08:04

## 2018-11-18 NOTE — PROGRESS NOTE ADULT - PROBLEM SELECTOR PLAN 3
continue Dilaudid and medical marijuana
contine plavix
continue  current meds plavix
continue analgesis pain medication and also contiue medical marijuana
continue dilaudid  and medical marijuana for pain
continue peg feeds and iv abx   monitor patient clinically
continue pt   may use medical marijuana she has been approved for
contiue abx   id following
id following on abx
on budesonide  will monitor
on plavix
roniull work up for uti and ams including iv fluids  cbc cmp   abx  blood and urine culture  cxr (+)
improving   montiro clinically

## 2018-11-18 NOTE — PROGRESS NOTE ADULT - SUBJECTIVE AND OBJECTIVE BOX
INTERVAL HPI/OVERNIGHT EVENTS:    MEDICATIONS  (STANDING):  buDESOnide 160 MICROgram(s)/formoterol 4.5 MICROgram(s) Inhaler 2 Puff(s) Inhalation two times a day  chlorhexidine 4% Liquid 1 Application(s) Topical <User Schedule>  clobetasol 0.05% Ointment 1 Application(s) Topical two times a day  clonazePAM Tablet 1.5 milliGRAM(s) Oral at bedtime  clopidogrel Tablet 75 milliGRAM(s) Oral daily  dextrose 5%. 1000 milliLiter(s) (50 mL/Hr) IV Continuous <Continuous>  dextrose 50% Injectable 12.5 Gram(s) IV Push once  dextrose 50% Injectable 25 Gram(s) IV Push once  dextrose 50% Injectable 25 Gram(s) IV Push once  ergocalciferol Drops 8000 Unit(s) Oral daily  ferrous    sulfate Liquid 300 milliGRAM(s) Enteral Tube daily  gabapentin 300 milliGRAM(s) Oral every 8 hours  heparin  Injectable 5000 Unit(s) SubCutaneous every 12 hours  insulin lispro (HumaLOG) corrective regimen sliding scale   SubCutaneous every 6 hours  insulin NPH human recombinant 6 Unit(s) SubCutaneous before breakfast  lidocaine   Patch 1 Patch Transdermal daily  medical marijuana oil 1 milliLiter(s),medical marijuana oil 1 milliLiter(s) 1 milliLiter(s) SubLingual <User Schedule>  minocycline 100 milliGRAM(s) Oral two times a day  mirtazapine 7.5 milliGRAM(s) Oral at bedtime  mupirocin 2% Ointment 1 Application(s) Topical two times a day  nystatin    Suspension 068574 Unit(s) Oral two times a day  pantoprazole   Suspension 40 milliGRAM(s) Oral before breakfast  predniSONE   Tablet 7.5 milliGRAM(s) Oral daily  QUEtiapine 25 milliGRAM(s) Oral at bedtime  sertraline 50 milliGRAM(s) Oral daily  tiotropium 18 MICROgram(s) Capsule 1 Capsule(s) Inhalation daily  tiZANidine 4 milliGRAM(s) Oral <User Schedule>    MEDICATIONS  (PRN):  acetaminophen    Suspension .. 650 milliGRAM(s) Oral every 8 hours PRN Mild Pain (1 - 3)  ALBUTerol/ipratropium for Nebulization 3 milliLiter(s) Nebulizer every 6 hours PRN Shortness of Breath and/or Wheezing  dextrose 40% Gel 15 Gram(s) Oral once PRN Blood Glucose LESS THAN 70 milliGRAM(s)/deciliter  diphenhydrAMINE   Elixir 25 milliGRAM(s) Enteral Tube every 4 hours PRN Itching  glucagon  Injectable 1 milliGRAM(s) IntraMuscular once PRN Glucose LESS THAN 70 milligrams/deciliter  HYDROmorphone   Tablet 2 milliGRAM(s) Oral every 4 hours PRN Severe Pain (7 - 10)  nystatin Powder 1 Application(s) Topical two times a day PRN rash/itchiness      Allergies    ASA; dye contrast (Anaphylaxis)  aspirin (Short breath)  divalproex sodium (Other (Unknown))  Haldol (Other (Unknown))  penicillin (Short breath; Rash)  sulfa drugs (Short breath; Rash)  Xanax (Other (Unknown))    Intolerances            PHYSICAL EXAM:   Vital Signs:  Vital Signs Last 24 Hrs  T(C): 37 (18 Nov 2018 06:22), Max: 37 (17 Nov 2018 14:39)  T(F): 98.6 (18 Nov 2018 06:22), Max: 98.6 (17 Nov 2018 14:39)  HR: 93 (18 Nov 2018 06:22) (90 - 98)  BP: 146/85 (18 Nov 2018 06:22) (131/74 - 150/88)  BP(mean): --  RR: 18 (18 Nov 2018 06:22) (17 - 18)  SpO2: 96% (18 Nov 2018 06:22) (96% - 100%)  Daily     Daily     GENERAL:  no distress  HEENT:  NC/AT,  anicteric  CHEST:   no increased effort, breath sounds clear  HEART:  Regular rhythm  ABDOMEN:  Soft, non-tender, non-distended, normoactive bowel sounds,  no masses ,no hepato-splenomegaly, no signs of chronic liver disease  EXTEREMITIES:  no cyanosis      LABS:    11-17    132<L>  |  96  |  18  ----------------------------<  100<H>  3.4<L>   |  22  |  0.37<L>    Ca    8.6      17 Nov 2018 06:51            RADIOLOGY & ADDITIONAL TESTS:

## 2018-11-18 NOTE — PROGRESS NOTE ADULT - SUBJECTIVE AND OBJECTIVE BOX
PULMONARY PROGRESS NOTE    MYRIAM HEATH  MRN-[id number]    Patient is a 68y old  Female who presents with a chief complaint of lethargy (17 Nov 2018 16:57)      HPI:  pt resting fairly comfortably. No distress.  Mild cough as per .    ROS:   -  -    ACTIVE MEDICATION LIST:  MEDICATIONS  (STANDING):  buDESOnide 160 MICROgram(s)/formoterol 4.5 MICROgram(s) Inhaler 2 Puff(s) Inhalation two times a day  chlorhexidine 4% Liquid 1 Application(s) Topical <User Schedule>  clobetasol 0.05% Ointment 1 Application(s) Topical two times a day  clonazePAM Tablet 1.5 milliGRAM(s) Oral at bedtime  clopidogrel Tablet 75 milliGRAM(s) Oral daily  dextrose 5%. 1000 milliLiter(s) (50 mL/Hr) IV Continuous <Continuous>  dextrose 50% Injectable 12.5 Gram(s) IV Push once  dextrose 50% Injectable 25 Gram(s) IV Push once  dextrose 50% Injectable 25 Gram(s) IV Push once  ergocalciferol Drops 8000 Unit(s) Oral daily  ferrous    sulfate Liquid 300 milliGRAM(s) Enteral Tube daily  gabapentin 300 milliGRAM(s) Oral every 8 hours  heparin  Injectable 5000 Unit(s) SubCutaneous every 12 hours  insulin lispro (HumaLOG) corrective regimen sliding scale   SubCutaneous every 6 hours  insulin NPH human recombinant 6 Unit(s) SubCutaneous before breakfast  lidocaine   Patch 1 Patch Transdermal daily  medical marijuana oil 1 milliLiter(s),medical marijuana oil 1 milliLiter(s) 1 milliLiter(s) SubLingual <User Schedule>  minocycline 100 milliGRAM(s) Oral two times a day  mirtazapine 7.5 milliGRAM(s) Oral at bedtime  mupirocin 2% Ointment 1 Application(s) Topical two times a day  nystatin    Suspension 604944 Unit(s) Oral two times a day  pantoprazole   Suspension 40 milliGRAM(s) Oral before breakfast  predniSONE   Tablet 7.5 milliGRAM(s) Oral daily  QUEtiapine 25 milliGRAM(s) Oral at bedtime  sertraline 50 milliGRAM(s) Oral daily  tiotropium 18 MICROgram(s) Capsule 1 Capsule(s) Inhalation daily  tiZANidine 4 milliGRAM(s) Oral <User Schedule>    MEDICATIONS  (PRN):  acetaminophen    Suspension .. 650 milliGRAM(s) Oral every 8 hours PRN Mild Pain (1 - 3)  ALBUTerol/ipratropium for Nebulization 3 milliLiter(s) Nebulizer every 6 hours PRN Shortness of Breath and/or Wheezing  dextrose 40% Gel 15 Gram(s) Oral once PRN Blood Glucose LESS THAN 70 milliGRAM(s)/deciliter  diphenhydrAMINE   Elixir 25 milliGRAM(s) Enteral Tube every 4 hours PRN Itching  glucagon  Injectable 1 milliGRAM(s) IntraMuscular once PRN Glucose LESS THAN 70 milligrams/deciliter  HYDROmorphone   Tablet 2 milliGRAM(s) Oral every 4 hours PRN Severe Pain (7 - 10)  nystatin Powder 1 Application(s) Topical two times a day PRN rash/itchiness      EXAM:  Vital Signs Last 24 Hrs  T(C): 37 (18 Nov 2018 06:22), Max: 37 (17 Nov 2018 14:39)  T(F): 98.6 (18 Nov 2018 06:22), Max: 98.6 (17 Nov 2018 14:39)  HR: 93 (18 Nov 2018 06:22) (90 - 98)  BP: 146/85 (18 Nov 2018 06:22) (131/74 - 150/88)  BP(mean): --  RR: 18 (18 Nov 2018 06:22) (17 - 18)  SpO2: 96% (18 Nov 2018 06:22) (96% - 100%)    GENERAL: The patient is awake and alert in no apparent distress.     SKIN: Warm, dry, no rashes    LUNGS: Bilateral rhonchi and wheeze increased from prior.     HEART: Regular rate and rhythm without murmur.    ABDOMEN: +BS, Soft, Nontender    EXTREMITIES: No clubbing, cyanosis,   1-2 plus edema    LABS/IMAGING: reviewed    PROBLEM LIST:  68y Female with HEALTH ISSUES - PROBLEM Dx:  IGT (impaired glucose tolerance): IGT (impaired glucose tolerance)  PNA (pneumonia): PNA (pneumonia)  CVA (cerebral vascular accident): CVA (cerebral vascular accident)  Hyponatremia: Hyponatremia  Cerebrovascular accident (CVA), unspecified mechanism: Cerebrovascular accident (CVA), unspecified mechanism  Mild asthma without complication, unspecified whether persistent: Mild asthma without complication, unspecified whether persistent  Cystitis: Cystitis  Encephalopathy acute: Encephalopathy acute  Acute hip pain, right: Acute hip pain, right  Other ulcerative colitis without complication: Other ulcerative colitis without complication  Depression: Depression  Anxiety: Anxiety  Encephalopathy, metabolic: Encephalopathy, metabolic  Altered mental state: Altered mental state  Pulmonary HTN: Pulmonary HTN  Aspiration pneumonia of left lower lobe, unspecified aspiration pneumonia type: Aspiration pneumonia of left lower lobe, unspecified aspiration pneumonia type  Poor clearance of secretions.      CT ground glass opacities, right pleural effusion, few nodular infiltrates, probable component LLL atelectasis. ?? tracheobronchomalacia.      RECS:  Continue bronchodilators.  Repeat CBC  Compare CAT to old.       Reagan Plasencia MD  [phone] PULMONARY PROGRESS NOTE    MYRIAM HEATH  MRN-[id number]    Patient is a 68y old  Female who presents with a chief complaint of lethargy (17 Nov 2018 16:57)      HPI:  pt resting fairly comfortably. No distress.  Mild cough as per .    ROS:   -  -    ACTIVE MEDICATION LIST:  MEDICATIONS  (STANDING):  buDESOnide 160 MICROgram(s)/formoterol 4.5 MICROgram(s) Inhaler 2 Puff(s) Inhalation two times a day  chlorhexidine 4% Liquid 1 Application(s) Topical <User Schedule>  clobetasol 0.05% Ointment 1 Application(s) Topical two times a day  clonazePAM Tablet 1.5 milliGRAM(s) Oral at bedtime  clopidogrel Tablet 75 milliGRAM(s) Oral daily  dextrose 5%. 1000 milliLiter(s) (50 mL/Hr) IV Continuous <Continuous>  dextrose 50% Injectable 12.5 Gram(s) IV Push once  dextrose 50% Injectable 25 Gram(s) IV Push once  dextrose 50% Injectable 25 Gram(s) IV Push once  ergocalciferol Drops 8000 Unit(s) Oral daily  ferrous    sulfate Liquid 300 milliGRAM(s) Enteral Tube daily  gabapentin 300 milliGRAM(s) Oral every 8 hours  heparin  Injectable 5000 Unit(s) SubCutaneous every 12 hours  insulin lispro (HumaLOG) corrective regimen sliding scale   SubCutaneous every 6 hours  insulin NPH human recombinant 6 Unit(s) SubCutaneous before breakfast  lidocaine   Patch 1 Patch Transdermal daily  medical marijuana oil 1 milliLiter(s),medical marijuana oil 1 milliLiter(s) 1 milliLiter(s) SubLingual <User Schedule>  minocycline 100 milliGRAM(s) Oral two times a day  mirtazapine 7.5 milliGRAM(s) Oral at bedtime  mupirocin 2% Ointment 1 Application(s) Topical two times a day  nystatin    Suspension 597554 Unit(s) Oral two times a day  pantoprazole   Suspension 40 milliGRAM(s) Oral before breakfast  predniSONE   Tablet 7.5 milliGRAM(s) Oral daily  QUEtiapine 25 milliGRAM(s) Oral at bedtime  sertraline 50 milliGRAM(s) Oral daily  tiotropium 18 MICROgram(s) Capsule 1 Capsule(s) Inhalation daily  tiZANidine 4 milliGRAM(s) Oral <User Schedule>    MEDICATIONS  (PRN):  acetaminophen    Suspension .. 650 milliGRAM(s) Oral every 8 hours PRN Mild Pain (1 - 3)  ALBUTerol/ipratropium for Nebulization 3 milliLiter(s) Nebulizer every 6 hours PRN Shortness of Breath and/or Wheezing  dextrose 40% Gel 15 Gram(s) Oral once PRN Blood Glucose LESS THAN 70 milliGRAM(s)/deciliter  diphenhydrAMINE   Elixir 25 milliGRAM(s) Enteral Tube every 4 hours PRN Itching  glucagon  Injectable 1 milliGRAM(s) IntraMuscular once PRN Glucose LESS THAN 70 milligrams/deciliter  HYDROmorphone   Tablet 2 milliGRAM(s) Oral every 4 hours PRN Severe Pain (7 - 10)  nystatin Powder 1 Application(s) Topical two times a day PRN rash/itchiness      EXAM:  Vital Signs Last 24 Hrs  T(C): 37 (18 Nov 2018 06:22), Max: 37 (17 Nov 2018 14:39)  T(F): 98.6 (18 Nov 2018 06:22), Max: 98.6 (17 Nov 2018 14:39)  HR: 93 (18 Nov 2018 06:22) (90 - 98)  BP: 146/85 (18 Nov 2018 06:22) (131/74 - 150/88)  BP(mean): --  RR: 18 (18 Nov 2018 06:22) (17 - 18)  SpO2: 96% (18 Nov 2018 06:22) (96% - 100%)    GENERAL: The patient is awake and alert in no apparent distress.     SKIN: Warm, dry, no rashes    LUNGS: Bilateral rhonchi and wheeze increased from prior.     HEART: Regular rate and rhythm without murmur.    ABDOMEN: +BS, Soft, Nontender    EXTREMITIES: No clubbing, cyanosis,   1 plus edema    LABS/IMAGING: reviewed    PROBLEM LIST:  68y Female with HEALTH ISSUES - PROBLEM Dx:  IGT (impaired glucose tolerance): IGT (impaired glucose tolerance)  PNA (pneumonia): PNA (pneumonia)  CVA (cerebral vascular accident): CVA (cerebral vascular accident)  Hyponatremia: Hyponatremia  Cerebrovascular accident (CVA), unspecified mechanism: Cerebrovascular accident (CVA), unspecified mechanism  Mild asthma without complication, unspecified whether persistent: Mild asthma without complication, unspecified whether persistent  Cystitis: Cystitis  Encephalopathy acute: Encephalopathy acute  Acute hip pain, right: Acute hip pain, right  Other ulcerative colitis without complication: Other ulcerative colitis without complication  Depression: Depression  Anxiety: Anxiety  Encephalopathy, metabolic: Encephalopathy, metabolic  Altered mental state: Altered mental state  Pulmonary HTN: Pulmonary HTN  Aspiration pneumonia of left lower lobe, unspecified aspiration pneumonia type: Aspiration pneumonia of left lower lobe, unspecified aspiration pneumonia type  Poor clearance of secretions.      CT ground glass opacities, right pleural effusion, few nodular infiltrates, probable component LLL atelectasis.     RECS:  Continue bronchodilators.  Repeat CBC  Compare CAT to old.   Consider gentle diuresis.    Reagan Plasencia MD  [phone]

## 2018-11-18 NOTE — PROGRESS NOTE ADULT - PROBLEM SELECTOR PROBLEM 1
Anxiety
Anxiety
Aspiration pneumonia of left lower lobe, unspecified aspiration pneumonia type
Cystitis
Encephalopathy acute
Hyponatremia
IGT (impaired glucose tolerance)
IGT (impaired glucose tolerance)
Mild asthma without complication, unspecified whether persistent
Mild asthma without complication, unspecified whether persistent
Encephalopathy, metabolic

## 2018-11-18 NOTE — PROGRESS NOTE ADULT - PROBLEM SELECTOR PROBLEM 5
Anxiety
CVA (cerebral vascular accident)
Acute hip pain, right
Altered mental state
Anxiety
Anxiety
Aspiration pneumonia of left lower lobe, unspecified aspiration pneumonia type
CVA (cerebral vascular accident)
Encephalopathy acute
IGT (impaired glucose tolerance)
IGT (impaired glucose tolerance)

## 2018-11-18 NOTE — PROGRESS NOTE ADULT - ASSESSMENT
dyspnea  ct scan results pending but seems to have more fluid unilaterally at the lung bas . agree with pulm to diurese 20 mg lasix x 1 iv  metabolic encephalopathy monitor clinically   sacral decubitus . continue treatment   anxiety /depression continue current course   asthma continue budesonide   chronic pain continue Dilaudid prn and medical marijuana    patient slated for dc home in am 11/19/2018

## 2018-11-18 NOTE — PROGRESS NOTE ADULT - SUBJECTIVE AND OBJECTIVE BOX
---___---___---___---___---___---___ ---___---___---___---___---___---___---___---___---___---                    <<<  M E D I C A L   A T T E N D I N G    F O L L O W    U P   N O T E  >>>    - Patient seen and examined by me approximately thirty minutes ago.  - In summary, patient is a 68y year old Female who origianlly presented with ams and now has been having some dyspnea         ---___---___---___---___---___---      <<<  MEDICATIONS:  >>>    MEDICATIONS  (STANDING):  buDESOnide 160 MICROgram(s)/formoterol 4.5 MICROgram(s) Inhaler 2 Puff(s) Inhalation two times a day  chlorhexidine 4% Liquid 1 Application(s) Topical <User Schedule>  clobetasol 0.05% Ointment 1 Application(s) Topical two times a day  clonazePAM Tablet 1.5 milliGRAM(s) Oral at bedtime  clopidogrel Tablet 75 milliGRAM(s) Oral daily  dextrose 5%. 1000 milliLiter(s) (50 mL/Hr) IV Continuous <Continuous>  dextrose 50% Injectable 12.5 Gram(s) IV Push once  dextrose 50% Injectable 25 Gram(s) IV Push once  dextrose 50% Injectable 25 Gram(s) IV Push once  ergocalciferol Drops 8000 Unit(s) Oral daily  ferrous    sulfate Liquid 300 milliGRAM(s) Enteral Tube daily  gabapentin 300 milliGRAM(s) Oral every 8 hours  heparin  Injectable 5000 Unit(s) SubCutaneous every 12 hours  insulin lispro (HumaLOG) corrective regimen sliding scale   SubCutaneous every 6 hours  insulin NPH human recombinant 6 Unit(s) SubCutaneous before breakfast  lidocaine   Patch 1 Patch Transdermal daily  medical marijuana oil 1 milliLiter(s),medical marijuana oil 1 milliLiter(s) 1 milliLiter(s) SubLingual <User Schedule>  minocycline 100 milliGRAM(s) Oral two times a day  mirtazapine 7.5 milliGRAM(s) Oral at bedtime  mupirocin 2% Ointment 1 Application(s) Topical two times a day  nystatin    Suspension [id number] Unit(s) Oral two times a day  pantoprazole   Suspension 40 milliGRAM(s) Oral before breakfast  predniSONE   Tablet 7.5 milliGRAM(s) Oral daily  QUEtiapine 25 milliGRAM(s) Oral at bedtime  sertraline 50 milliGRAM(s) Oral daily  tiotropium 18 MICROgram(s) Capsule 1 Capsule(s) Inhalation daily  tiZANidine 4 milliGRAM(s) Oral <User Schedule>      MEDICATIONS  (PRN):  acetaminophen    Suspension .. 650 milliGRAM(s) Oral every 8 hours PRN Mild Pain (1 - 3)  ALBUTerol/ipratropium for Nebulization 3 milliLiter(s) Nebulizer every 6 hours PRN Shortness of Breath and/or Wheezing  dextrose 40% Gel 15 Gram(s) Oral once PRN Blood Glucose LESS THAN 70 milliGRAM(s)/deciliter  diphenhydrAMINE   Elixir 25 milliGRAM(s) Enteral Tube every 4 hours PRN Itching  glucagon  Injectable 1 milliGRAM(s) IntraMuscular once PRN Glucose LESS THAN 70 milligrams/deciliter  HYDROmorphone   Tablet 2 milliGRAM(s) Oral every 4 hours PRN Severe Pain (7 - 10)  nystatin Powder 1 Application(s) Topical two times a day PRN rash/itchiness       ---___---___---___---___---___---     <<<REVIEW OF SYSTEM: >>>    GEN: no fever, no chills, no pain  RESP:  SOB, no cough, no sputum  CVS: no chest pain, no palpitations, no edema  GI: no abdominal pain, no nausea, no vomiting, no constipation, no diarrhea  : no dysurea, no frequency  NEURO: no headache, no dizziness  PSYCH: no depression, not anxious  Derm : no itching, no rash     ---___---___---___---___---___---          <<<  VITAL SIGNS: >>>    T(F): 98.6 (11-18-18 @ 06:22), Max: 98.6 (11-17-18 @ 14:39)  HR: 93 (11-18-18 @ 06:22) (90 - 98)  BP: 146/85 (11-18-18 @ 06:22) (131/74 - 150/88)  RR: 18 (11-18-18 @ 06:22) (17 - 18)  SpO2: 96% (11-18-18 @ 06:22) (96% - 100%)  Wt(kg): --  CAPILLARY BLOOD GLUCOSE      POCT Blood Glucose.: 142 mg/dL (18 Nov 2018 06:20)    I&O's Summary    17 Nov 2018 07:01  -  18 Nov 2018 07:00  --------------------------------------------------------  IN: 1000 mL / OUT: 800 mL / NET: 200 mL         ---___---___---___---___---___---                       PHYSICAL EXAM:    GEN: A&O X 3 , NAD , comfortable  HEENT: NCAT, PERRL, MMM, no scleral icterus, hearing intact  NECK: Supple, No JVD  CVS: S1S2 , regular , No M/R/G appreciated  PULM: decreased breath sounds noted   ABD.: soft. non tender, non distended,  bowel sounds present peg noted   Extrem: intact pulses , no edema noted  Derm: No rash or ecchymosis noted  PSYCH: normal mood,  depression,  anxious     ---___---___---___---___---___---     <<<  LAB AND IMAGING: >>>                11-17    132<L>  |  96  |  18  ----------------------------<  100<H>  3.4<L>   |  22  |  0.37<L>    Ca    8.6      17 Nov 2018 06:51                                 [All pertinent / recent available Imaging reports and other labs reviewed]     ---___---___---___---___---___---___ ---___---___---___---___---           <<<  DEO FAUSTIN S MATTHEW FAUSTIN N T   A N D   P L A N :  >>>          -GI/DVT Prophylaxis.    --------------------------------------------  Case discussed with   Education given on     >>______________________<<      Deniz Bolden .         phone   [phone]

## 2018-11-18 NOTE — PROGRESS NOTE ADULT - PROBLEM SELECTOR PROBLEM 3
Acute hip pain, right
Altered mental state
CVA (cerebral vascular accident)
CVA (cerebral vascular accident)
Cerebrovascular accident (CVA), unspecified mechanism
Cystitis
Encephalopathy acute
Mild asthma without complication, unspecified whether persistent
PNA (pneumonia)
Encephalopathy, metabolic

## 2018-11-18 NOTE — PROGRESS NOTE ADULT - PROBLEM SELECTOR PROBLEM 2
Acute hip pain, right
Aspiration pneumonia of left lower lobe, unspecified aspiration pneumonia type
Cystitis
Encephalopathy, metabolic
Mild asthma without complication, unspecified whether persistent
Mild asthma without complication, unspecified whether persistent
PNA (pneumonia)
PNA (pneumonia)
Pulmonary HTN
Aspiration pneumonia of left lower lobe, unspecified aspiration pneumonia type
Anxiety

## 2018-11-18 NOTE — PROGRESS NOTE ADULT - PROBLEM SELECTOR PLAN 1
CONTINUE TO REPLENISH THROUGH PEG FEEDS AND IV
clinically improving   monitor labs and vitals
continue anxiolytics and antidepressants
continue current meds and care   will continue anxiolytics and also bzd and psychiatry  medication
continue insulin sliding scale
continue meds
continue to monitor clinically and replenish with ns if necessary
has been stable  off abx   will need gavin change monthly
id following and called   start abx   culture take for uti and blood   has a history of mrsa infection
monitor labs
on insulin sliding scale if needed. has elevated sugar due to chronic prednisone
continue budesonide  insulin to counteract sugar level from steroids
improving   monitor clinically

## 2018-11-18 NOTE — PROGRESS NOTE ADULT - ASSESSMENT
doing well from GI standpoint, PEG site clean, tolerating full dose feeds, no vomiting or signs of aspiration

## 2018-11-18 NOTE — PROGRESS NOTE ADULT - PROBLEM SELECTOR PLAN 4
continue meds
continue with budesonide
As above
contine iv abx  monitor wbc and cmp
continue abs   awaiting cultures
continue budesonide and Pulmonary following
improving  will monitor
medical marijuans for pain and insomnia and  analgesis
monitior cliniclaly   follow cbc and cm p
monitor clinically   being covered with antibiotics follow labs
nalgesis   palliative care for pain evaluation
pulmonary following  continue current meds a per  pulmonary
repeat ct chest  patient having increased dyspnea and will get ct chest . will have limited value due to no contrast

## 2018-11-19 VITALS
OXYGEN SATURATION: 98 % | RESPIRATION RATE: 18 BRPM | TEMPERATURE: 98 F | DIASTOLIC BLOOD PRESSURE: 86 MMHG | SYSTOLIC BLOOD PRESSURE: 139 MMHG | HEART RATE: 90 BPM

## 2018-11-19 LAB
ANION GAP SERPL CALC-SCNC: 14 MMOL/L — SIGNIFICANT CHANGE UP (ref 5–17)
BUN SERPL-MCNC: 19 MG/DL — SIGNIFICANT CHANGE UP (ref 7–23)
CALCIUM SERPL-MCNC: 8.8 MG/DL — SIGNIFICANT CHANGE UP (ref 8.4–10.5)
CHLORIDE SERPL-SCNC: 94 MMOL/L — LOW (ref 96–108)
CO2 SERPL-SCNC: 23 MMOL/L — SIGNIFICANT CHANGE UP (ref 22–31)
CREAT SERPL-MCNC: 0.35 MG/DL — LOW (ref 0.5–1.3)
GLUCOSE BLDC GLUCOMTR-MCNC: 128 MG/DL — HIGH (ref 70–99)
GLUCOSE BLDC GLUCOMTR-MCNC: 141 MG/DL — HIGH (ref 70–99)
GLUCOSE BLDC GLUCOMTR-MCNC: 141 MG/DL — HIGH (ref 70–99)
GLUCOSE BLDC GLUCOMTR-MCNC: 180 MG/DL — HIGH (ref 70–99)
GLUCOSE SERPL-MCNC: 125 MG/DL — HIGH (ref 70–99)
HCT VFR BLD CALC: 27.7 % — LOW (ref 34.5–45)
HGB BLD-MCNC: 9.8 G/DL — LOW (ref 11.5–15.5)
MAGNESIUM SERPL-MCNC: 1.4 MG/DL — LOW (ref 1.6–2.6)
MCHC RBC-ENTMCNC: 32.1 PG — SIGNIFICANT CHANGE UP (ref 27–34)
MCHC RBC-ENTMCNC: 35.3 GM/DL — SIGNIFICANT CHANGE UP (ref 32–36)
MCV RBC AUTO: 90.9 FL — SIGNIFICANT CHANGE UP (ref 80–100)
PLATELET # BLD AUTO: 272 K/UL — SIGNIFICANT CHANGE UP (ref 150–400)
POTASSIUM SERPL-MCNC: 3.7 MMOL/L — SIGNIFICANT CHANGE UP (ref 3.5–5.3)
POTASSIUM SERPL-SCNC: 3.7 MMOL/L — SIGNIFICANT CHANGE UP (ref 3.5–5.3)
RBC # BLD: 3.04 M/UL — LOW (ref 3.8–5.2)
RBC # FLD: 14.2 % — SIGNIFICANT CHANGE UP (ref 10.3–14.5)
SODIUM SERPL-SCNC: 131 MMOL/L — LOW (ref 135–145)
WBC # BLD: 10.7 K/UL — HIGH (ref 3.8–10.5)
WBC # FLD AUTO: 10.7 K/UL — HIGH (ref 3.8–10.5)

## 2018-11-19 PROCEDURE — 85014 HEMATOCRIT: CPT

## 2018-11-19 PROCEDURE — 84295 ASSAY OF SERUM SODIUM: CPT

## 2018-11-19 PROCEDURE — 85730 THROMBOPLASTIN TIME PARTIAL: CPT

## 2018-11-19 PROCEDURE — 99232 SBSQ HOSP IP/OBS MODERATE 35: CPT

## 2018-11-19 PROCEDURE — 82962 GLUCOSE BLOOD TEST: CPT

## 2018-11-19 PROCEDURE — 97110 THERAPEUTIC EXERCISES: CPT

## 2018-11-19 PROCEDURE — 85652 RBC SED RATE AUTOMATED: CPT

## 2018-11-19 PROCEDURE — 82803 BLOOD GASES ANY COMBINATION: CPT

## 2018-11-19 PROCEDURE — 82947 ASSAY GLUCOSE BLOOD QUANT: CPT

## 2018-11-19 PROCEDURE — 73552 X-RAY EXAM OF FEMUR 2/>: CPT

## 2018-11-19 PROCEDURE — 85027 COMPLETE CBC AUTOMATED: CPT

## 2018-11-19 PROCEDURE — 87086 URINE CULTURE/COLONY COUNT: CPT

## 2018-11-19 PROCEDURE — 96375 TX/PRO/DX INJ NEW DRUG ADDON: CPT

## 2018-11-19 PROCEDURE — 84132 ASSAY OF SERUM POTASSIUM: CPT

## 2018-11-19 PROCEDURE — 97530 THERAPEUTIC ACTIVITIES: CPT

## 2018-11-19 PROCEDURE — 84145 PROCALCITONIN (PCT): CPT

## 2018-11-19 PROCEDURE — 97162 PT EVAL MOD COMPLEX 30 MIN: CPT

## 2018-11-19 PROCEDURE — 93970 EXTREMITY STUDY: CPT

## 2018-11-19 PROCEDURE — 86140 C-REACTIVE PROTEIN: CPT

## 2018-11-19 PROCEDURE — 87040 BLOOD CULTURE FOR BACTERIA: CPT

## 2018-11-19 PROCEDURE — 83605 ASSAY OF LACTIC ACID: CPT

## 2018-11-19 PROCEDURE — 83735 ASSAY OF MAGNESIUM: CPT

## 2018-11-19 PROCEDURE — 71250 CT THORAX DX C-: CPT

## 2018-11-19 PROCEDURE — 96365 THER/PROPH/DIAG IV INF INIT: CPT

## 2018-11-19 PROCEDURE — 88346 IMFLUOR 1ST 1ANTB STAIN PX: CPT

## 2018-11-19 PROCEDURE — 87186 SC STD MICRODIL/AGAR DIL: CPT

## 2018-11-19 PROCEDURE — 83036 HEMOGLOBIN GLYCOSYLATED A1C: CPT

## 2018-11-19 PROCEDURE — 82435 ASSAY OF BLOOD CHLORIDE: CPT

## 2018-11-19 PROCEDURE — 88305 TISSUE EXAM BY PATHOLOGIST: CPT

## 2018-11-19 PROCEDURE — 82330 ASSAY OF CALCIUM: CPT

## 2018-11-19 PROCEDURE — 88350 IMFLUOR EA ADDL 1ANTB STN PX: CPT

## 2018-11-19 PROCEDURE — 71045 X-RAY EXAM CHEST 1 VIEW: CPT

## 2018-11-19 PROCEDURE — 80048 BASIC METABOLIC PNL TOTAL CA: CPT

## 2018-11-19 PROCEDURE — 94640 AIRWAY INHALATION TREATMENT: CPT

## 2018-11-19 PROCEDURE — 80053 COMPREHEN METABOLIC PANEL: CPT

## 2018-11-19 PROCEDURE — 99285 EMERGENCY DEPT VISIT HI MDM: CPT | Mod: 25

## 2018-11-19 PROCEDURE — 85610 PROTHROMBIN TIME: CPT

## 2018-11-19 PROCEDURE — 81001 URINALYSIS AUTO W/SCOPE: CPT

## 2018-11-19 PROCEDURE — 73502 X-RAY EXAM HIP UNI 2-3 VIEWS: CPT

## 2018-11-19 RX ORDER — NYSTATIN CREAM 100000 [USP'U]/G
1 CREAM TOPICAL
Qty: 1 | Refills: 0 | OUTPATIENT
Start: 2018-11-19

## 2018-11-19 RX ORDER — MAGNESIUM OXIDE 400 MG ORAL TABLET 241.3 MG
1 TABLET ORAL
Qty: 15 | Refills: 0 | OUTPATIENT
Start: 2018-11-19

## 2018-11-19 RX ORDER — TRIAMTERENE 100 MG/1
1 CAPSULE ORAL
Qty: 15 | Refills: 0 | OUTPATIENT
Start: 2018-11-19

## 2018-11-19 RX ORDER — ACETAMINOPHEN 500 MG
20.31 TABLET ORAL
Qty: 500 | Refills: 0 | OUTPATIENT
Start: 2018-11-19

## 2018-11-19 RX ORDER — POTASSIUM CHLORIDE 20 MEQ
1 PACKET (EA) ORAL
Qty: 15 | Refills: 0 | OUTPATIENT
Start: 2018-11-19 | End: 2018-12-18

## 2018-11-19 RX ORDER — TIOTROPIUM BROMIDE 18 UG/1
1 CAPSULE ORAL; RESPIRATORY (INHALATION)
Qty: 1 | Refills: 0 | OUTPATIENT
Start: 2018-11-19

## 2018-11-19 RX ORDER — MIRTAZAPINE 45 MG/1
1 TABLET, ORALLY DISINTEGRATING ORAL
Qty: 14 | Refills: 0 | OUTPATIENT
Start: 2018-11-19

## 2018-11-19 RX ORDER — CLONAZEPAM 1 MG
3 TABLET ORAL
Qty: 15 | Refills: 0 | OUTPATIENT
Start: 2018-11-19 | End: 2018-11-23

## 2018-11-19 RX ORDER — FENTANYL CITRATE 50 UG/ML
1 INJECTION INTRAVENOUS
Qty: 0 | Refills: 0 | Status: DISCONTINUED | OUTPATIENT
Start: 2018-11-19 | End: 2018-11-19

## 2018-11-19 RX ORDER — HYDROMORPHONE HYDROCHLORIDE 2 MG/ML
1 INJECTION INTRAMUSCULAR; INTRAVENOUS; SUBCUTANEOUS
Qty: 5 | Refills: 0 | OUTPATIENT
Start: 2018-11-19 | End: 2018-11-23

## 2018-11-19 RX ORDER — BUDESONIDE AND FORMOTEROL FUMARATE DIHYDRATE 160; 4.5 UG/1; UG/1
2 AEROSOL RESPIRATORY (INHALATION)
Qty: 1 | Refills: 0 | OUTPATIENT
Start: 2018-11-19

## 2018-11-19 RX ORDER — CLOPIDOGREL BISULFATE 75 MG/1
1 TABLET, FILM COATED ORAL
Qty: 30 | Refills: 0 | OUTPATIENT
Start: 2018-11-19

## 2018-11-19 RX ORDER — FENTANYL CITRATE 50 UG/ML
1 INJECTION INTRAVENOUS
Qty: 3 | Refills: 0 | OUTPATIENT
Start: 2018-11-19

## 2018-11-19 RX ORDER — ISOPROPYL ALCOHOL, BENZOCAINE .7; .06 ML/ML; ML/ML
0 SWAB TOPICAL
Qty: 2 | Refills: 0 | OUTPATIENT
Start: 2018-11-19

## 2018-11-19 RX ORDER — TIOTROPIUM BROMIDE 18 UG/1
1 CAPSULE ORAL; RESPIRATORY (INHALATION)
Qty: 0 | Refills: 0 | COMMUNITY

## 2018-11-19 RX ORDER — METFORMIN HYDROCHLORIDE 850 MG/1
1 TABLET ORAL
Qty: 30 | Refills: 0 | OUTPATIENT
Start: 2018-11-19 | End: 2018-12-18

## 2018-11-19 RX ORDER — MAGNESIUM OXIDE 400 MG ORAL TABLET 241.3 MG
400 TABLET ORAL ONCE
Qty: 0 | Refills: 0 | Status: COMPLETED | OUTPATIENT
Start: 2018-11-19 | End: 2018-11-19

## 2018-11-19 RX ORDER — CLONAZEPAM 1 MG
3 TABLET ORAL
Qty: 0 | Refills: 0 | COMMUNITY
Start: 2018-11-19

## 2018-11-19 RX ORDER — SERTRALINE 25 MG/1
1 TABLET, FILM COATED ORAL
Qty: 14 | Refills: 0 | OUTPATIENT
Start: 2018-11-19

## 2018-11-19 RX ORDER — ERGOCALCIFEROL 1.25 MG/1
1 CAPSULE ORAL
Qty: 30 | Refills: 0 | OUTPATIENT
Start: 2018-11-19

## 2018-11-19 RX ORDER — ACETAMINOPHEN 500 MG
0 TABLET ORAL
Qty: 0 | Refills: 0 | COMMUNITY
Start: 2018-11-19

## 2018-11-19 RX ORDER — GABAPENTIN 400 MG/1
1 CAPSULE ORAL
Qty: 42 | Refills: 0 | OUTPATIENT
Start: 2018-11-19 | End: 2018-12-02

## 2018-11-19 RX ORDER — QUETIAPINE FUMARATE 200 MG/1
1 TABLET, FILM COATED ORAL
Qty: 14 | Refills: 0 | OUTPATIENT
Start: 2018-11-19

## 2018-11-19 RX ORDER — CLONAZEPAM 1 MG
3 TABLET ORAL
Qty: 15 | Refills: 0 | OUTPATIENT
Start: 2018-11-19

## 2018-11-19 RX ORDER — PANTOPRAZOLE SODIUM 20 MG/1
1 TABLET, DELAYED RELEASE ORAL
Qty: 30 | Refills: 0 | OUTPATIENT
Start: 2018-11-19 | End: 2018-12-18

## 2018-11-19 RX ORDER — FENTANYL CITRATE 50 UG/ML
1 INJECTION INTRAVENOUS
Qty: 2 | Refills: 0 | OUTPATIENT
Start: 2018-11-19 | End: 2018-11-23

## 2018-11-19 RX ORDER — METFORMIN HYDROCHLORIDE 850 MG/1
1 TABLET ORAL
Qty: 0 | Refills: 0 | DISCHARGE
Start: 2018-11-19

## 2018-11-19 RX ORDER — FENTANYL CITRATE 50 UG/ML
1 INJECTION INTRAVENOUS
Qty: 0 | Refills: 0 | COMMUNITY
Start: 2018-11-19

## 2018-11-19 RX ORDER — MINOCYCLINE HYDROCHLORIDE 45 MG/1
1 TABLET, EXTENDED RELEASE ORAL
Qty: 60 | Refills: 0 | OUTPATIENT
Start: 2018-11-19 | End: 2018-12-18

## 2018-11-19 RX ORDER — TIZANIDINE 4 MG/1
1 TABLET ORAL
Qty: 28 | Refills: 0 | OUTPATIENT
Start: 2018-11-19

## 2018-11-19 RX ORDER — LIDOCAINE 4 G/100G
1 CREAM TOPICAL
Qty: 30 | Refills: 0 | OUTPATIENT
Start: 2018-11-19 | End: 2018-12-18

## 2018-11-19 RX ORDER — TIZANIDINE 4 MG/1
1 TABLET ORAL
Qty: 0 | Refills: 0 | COMMUNITY

## 2018-11-19 RX ORDER — ERGOCALCIFEROL 1.25 MG/1
8000 CAPSULE ORAL
Qty: 30 | Refills: 0 | OUTPATIENT
Start: 2018-11-19 | End: 2018-12-18

## 2018-11-19 RX ORDER — IPRATROPIUM/ALBUTEROL SULFATE 18-103MCG
3 AEROSOL WITH ADAPTER (GRAM) INHALATION
Qty: 360 | Refills: 0 | OUTPATIENT
Start: 2018-11-19

## 2018-11-19 RX ORDER — FERROUS SULFATE 325(65) MG
5 TABLET ORAL
Qty: 150 | Refills: 0 | OUTPATIENT
Start: 2018-11-19

## 2018-11-19 RX ORDER — PANTOPRAZOLE SODIUM 20 MG/1
1 TABLET, DELAYED RELEASE ORAL
Qty: 0 | Refills: 0 | COMMUNITY

## 2018-11-19 RX ADMIN — PANTOPRAZOLE SODIUM 40 MILLIGRAM(S): 20 TABLET, DELAYED RELEASE ORAL at 06:28

## 2018-11-19 RX ADMIN — CLOPIDOGREL BISULFATE 75 MILLIGRAM(S): 75 TABLET, FILM COATED ORAL at 11:02

## 2018-11-19 RX ADMIN — HUMAN INSULIN 6 UNIT(S): 100 INJECTION, SUSPENSION SUBCUTANEOUS at 06:27

## 2018-11-19 RX ADMIN — Medication 7.5 MILLIGRAM(S): at 06:27

## 2018-11-19 RX ADMIN — CHLORHEXIDINE GLUCONATE 1 APPLICATION(S): 213 SOLUTION TOPICAL at 09:11

## 2018-11-19 RX ADMIN — MUPIROCIN 1 APPLICATION(S): 20 OINTMENT TOPICAL at 06:28

## 2018-11-19 RX ADMIN — LIDOCAINE 1 PATCH: 4 CREAM TOPICAL at 11:01

## 2018-11-19 RX ADMIN — SERTRALINE 50 MILLIGRAM(S): 25 TABLET, FILM COATED ORAL at 11:02

## 2018-11-19 RX ADMIN — Medication 1 APPLICATION(S): at 17:36

## 2018-11-19 RX ADMIN — GABAPENTIN 300 MILLIGRAM(S): 400 CAPSULE ORAL at 14:17

## 2018-11-19 RX ADMIN — ERGOCALCIFEROL 8000 UNIT(S): 1.25 CAPSULE ORAL at 14:17

## 2018-11-19 RX ADMIN — MAGNESIUM OXIDE 400 MG ORAL TABLET 400 MILLIGRAM(S): 241.3 TABLET ORAL at 11:02

## 2018-11-19 RX ADMIN — FENTANYL CITRATE 1 PATCH: 50 INJECTION INTRAVENOUS at 07:18

## 2018-11-19 RX ADMIN — TIZANIDINE 4 MILLIGRAM(S): 4 TABLET ORAL at 09:12

## 2018-11-19 RX ADMIN — MINOCYCLINE HYDROCHLORIDE 100 MILLIGRAM(S): 45 TABLET, EXTENDED RELEASE ORAL at 06:27

## 2018-11-19 RX ADMIN — GABAPENTIN 300 MILLIGRAM(S): 400 CAPSULE ORAL at 06:27

## 2018-11-19 RX ADMIN — BUDESONIDE AND FORMOTEROL FUMARATE DIHYDRATE 2 PUFF(S): 160; 4.5 AEROSOL RESPIRATORY (INHALATION) at 17:36

## 2018-11-19 RX ADMIN — Medication 3 MILLILITER(S): at 14:15

## 2018-11-19 RX ADMIN — BUDESONIDE AND FORMOTEROL FUMARATE DIHYDRATE 2 PUFF(S): 160; 4.5 AEROSOL RESPIRATORY (INHALATION) at 06:27

## 2018-11-19 RX ADMIN — Medication 300 MILLIGRAM(S): at 11:03

## 2018-11-19 RX ADMIN — Medication 1: at 12:12

## 2018-11-19 RX ADMIN — HEPARIN SODIUM 5000 UNIT(S): 5000 INJECTION INTRAVENOUS; SUBCUTANEOUS at 06:27

## 2018-11-19 RX ADMIN — TIOTROPIUM BROMIDE 1 CAPSULE(S): 18 CAPSULE ORAL; RESPIRATORY (INHALATION) at 17:37

## 2018-11-19 RX ADMIN — LIDOCAINE 1 PATCH: 4 CREAM TOPICAL at 01:11

## 2018-11-19 RX ADMIN — MINOCYCLINE HYDROCHLORIDE 100 MILLIGRAM(S): 45 TABLET, EXTENDED RELEASE ORAL at 17:36

## 2018-11-19 RX ADMIN — HEPARIN SODIUM 5000 UNIT(S): 5000 INJECTION INTRAVENOUS; SUBCUTANEOUS at 17:36

## 2018-11-19 RX ADMIN — Medication 500000 UNIT(S): at 06:27

## 2018-11-19 RX ADMIN — Medication 1 APPLICATION(S): at 06:28

## 2018-11-19 RX ADMIN — MUPIROCIN 1 APPLICATION(S): 20 OINTMENT TOPICAL at 17:36

## 2018-11-19 NOTE — PROGRESS NOTE ADULT - PROVIDER SPECIALTY LIST ADULT
Family Medicine
Gastroenterology
Infectious Disease
Pulmonology
Gastroenterology
Gastroenterology
Family Medicine
Family Medicine

## 2018-11-19 NOTE — CHART NOTE - NSCHARTNOTEFT_GEN_A_CORE
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 This is necessary to achieve daily tasks and therapies which cannot achieved with the use of a cane or rolling walker. Patient and family are in agreement with wheelchair use at home and assistance will be provided.         Ritu Morataya NP [facility] [id number]

## 2018-11-19 NOTE — PROGRESS NOTE ADULT - SUBJECTIVE AND OBJECTIVE BOX
MYRIAM WHITE:[id number],   68yFemale followed for:  ASA; dye contrast (Anaphylaxis)  aspirin (Short breath)  divalproex sodium (Other (Unknown))  Haldol (Other (Unknown))  penicillin (Short breath; Rash)  sulfa drugs (Short breath; Rash)  Xanax (Other (Unknown))    PAST MEDICAL & SURGICAL HISTORY:  CVA (cerebral vascular accident)  Fatty pancreas  PNA (pneumonia)  Pulmonary HTN  IGT (impaired glucose tolerance)  Ulcerative colitis  Acid reflux  Anxiety  Depression  Mouth sores  HLD (hyperlipidemia)  Asthma  Humeral head fracture  H/O: hysterectomy  H/O cataract extraction, left  History of knee replacement    FAMILY HISTORY:  Family history of dementia (Grandparent)  Family history of colon cancer (Grandparent)    MEDICATIONS  (STANDING):  buDESOnide 160 MICROgram(s)/formoterol 4.5 MICROgram(s) Inhaler 2 Puff(s) Inhalation two times a day  chlorhexidine 4% Liquid 1 Application(s) Topical <User Schedule>  clobetasol 0.05% Ointment 1 Application(s) Topical two times a day  clonazePAM Tablet 1.5 milliGRAM(s) Oral at bedtime  clopidogrel Tablet 75 milliGRAM(s) Oral daily  dextrose 5%. 1000 milliLiter(s) (50 mL/Hr) IV Continuous <Continuous>  dextrose 50% Injectable 12.5 Gram(s) IV Push once  dextrose 50% Injectable 25 Gram(s) IV Push once  dextrose 50% Injectable 25 Gram(s) IV Push once  ergocalciferol Drops 8000 Unit(s) Oral daily  ferrous    sulfate Liquid 300 milliGRAM(s) Enteral Tube daily  gabapentin 300 milliGRAM(s) Oral every 8 hours  heparin  Injectable 5000 Unit(s) SubCutaneous every 12 hours  insulin lispro (HumaLOG) corrective regimen sliding scale   SubCutaneous every 6 hours  insulin NPH human recombinant 6 Unit(s) SubCutaneous before breakfast  lidocaine   Patch 1 Patch Transdermal daily  medical marijuana oil 1 milliLiter(s),medical marijuana oil 1 milliLiter(s) 1 milliLiter(s) SubLingual <User Schedule>  minocycline 100 milliGRAM(s) Oral two times a day  mirtazapine 7.5 milliGRAM(s) Oral at bedtime  mupirocin 2% Ointment 1 Application(s) Topical two times a day  pantoprazole   Suspension 40 milliGRAM(s) Oral before breakfast  predniSONE   Tablet 7.5 milliGRAM(s) Oral daily  QUEtiapine 25 milliGRAM(s) Oral at bedtime  sertraline 50 milliGRAM(s) Oral daily  tiotropium 18 MICROgram(s) Capsule 1 Capsule(s) Inhalation daily  tiZANidine 4 milliGRAM(s) Oral <User Schedule>    MEDICATIONS  (PRN):  acetaminophen    Suspension .. 650 milliGRAM(s) Oral every 8 hours PRN Mild Pain (1 - 3)  ALBUTerol/ipratropium for Nebulization 3 milliLiter(s) Nebulizer every 6 hours PRN Shortness of Breath and/or Wheezing  dextrose 40% Gel 15 Gram(s) Oral once PRN Blood Glucose LESS THAN 70 milliGRAM(s)/deciliter  diphenhydrAMINE   Elixir 25 milliGRAM(s) Enteral Tube every 4 hours PRN Itching  glucagon  Injectable 1 milliGRAM(s) IntraMuscular once PRN Glucose LESS THAN 70 milligrams/deciliter  HYDROmorphone   Tablet 2 milliGRAM(s) Oral every 4 hours PRN Severe Pain (7 - 10)  nystatin Powder 1 Application(s) Topical two times a day PRN rash/itchiness      Vital Signs Last 24 Hrs  T(C): 36.8 (19 Nov 2018 06:08), Max: 37.1 (18 Nov 2018 18:01)  T(F): 98.2 (19 Nov 2018 06:08), Max: 98.7 (18 Nov 2018 18:01)  HR: 85 (19 Nov 2018 06:08) (85 - 99)  BP: 130/81 (19 Nov 2018 06:08) (129/80 - 145/87)  BP(mean): --  RR: 18 (19 Nov 2018 06:08) (18 - 18)  SpO2: 97% (19 Nov 2018 06:08) (97% - 97%)  nc/at  s1s2  cta  soft, nt, nd no guarding or rebound  no c/c/e      11-19    131<L>  |  94<L>  |  19  ----------------------------<  125<H>  3.7   |  23  |  0.35<L>    Ca    8.8      19 Nov 2018 06:33  Mg     1.4     11-19

## 2018-11-19 NOTE — PROGRESS NOTE ADULT - SUBJECTIVE AND OBJECTIVE BOX
PULMONARY PROGRESS NOTE    MYRIAM HEATH  MRN-[id number]    Patient is a 68y old  Female who presents with a chief complaint of lethargy (13 Nov 2018 08:56)      HPI:  daughter at bedside, no respiratory complaints this am      MEDICATIONS  (STANDING):  buDESOnide 160 MICROgram(s)/formoterol 4.5 MICROgram(s) Inhaler 2 Puff(s) Inhalation two times a day  chlorhexidine 4% Liquid 1 Application(s) Topical <User Schedule>  clobetasol 0.05% Ointment 1 Application(s) Topical two times a day  clonazePAM Tablet 1.5 milliGRAM(s) Oral at bedtime  clopidogrel Tablet 75 milliGRAM(s) Oral daily  dextrose 5%. 1000 milliLiter(s) (50 mL/Hr) IV Continuous <Continuous>  dextrose 50% Injectable 12.5 Gram(s) IV Push once  dextrose 50% Injectable 25 Gram(s) IV Push once  dextrose 50% Injectable 25 Gram(s) IV Push once  ergocalciferol Drops 8000 Unit(s) Oral daily  fentaNYL   Patch  12 MICROgram(s)/Hr 1 Patch Transdermal every 72 hours  ferrous    sulfate Liquid 300 milliGRAM(s) Enteral Tube daily  gabapentin 300 milliGRAM(s) Oral every 8 hours  heparin  Injectable 5000 Unit(s) SubCutaneous every 12 hours  insulin lispro (HumaLOG) corrective regimen sliding scale   SubCutaneous every 6 hours  insulin NPH human recombinant 6 Unit(s) SubCutaneous before breakfast  lidocaine   Patch 1 Patch Transdermal daily  magnesium oxide 400 milliGRAM(s) Oral once  medical marijuana oil 1 milliLiter(s),medical marijuana oil 1 milliLiter(s) 1 milliLiter(s) SubLingual <User Schedule>  minocycline 100 milliGRAM(s) Oral two times a day  mirtazapine 7.5 milliGRAM(s) Oral at bedtime  mupirocin 2% Ointment 1 Application(s) Topical two times a day  pantoprazole   Suspension 40 milliGRAM(s) Oral before breakfast  predniSONE   Tablet 7.5 milliGRAM(s) Oral daily  QUEtiapine 25 milliGRAM(s) Oral at bedtime  sertraline 50 milliGRAM(s) Oral daily  tiotropium 18 MICROgram(s) Capsule 1 Capsule(s) Inhalation daily  tiZANidine 4 milliGRAM(s) Oral <User Schedule>    MEDICATIONS  (PRN):  acetaminophen    Suspension .. 650 milliGRAM(s) Oral every 8 hours PRN Mild Pain (1 - 3)  ALBUTerol/ipratropium for Nebulization 3 milliLiter(s) Nebulizer every 6 hours PRN Shortness of Breath and/or Wheezing  dextrose 40% Gel 15 Gram(s) Oral once PRN Blood Glucose LESS THAN 70 milliGRAM(s)/deciliter  diphenhydrAMINE   Elixir 25 milliGRAM(s) Enteral Tube every 4 hours PRN Itching  glucagon  Injectable 1 milliGRAM(s) IntraMuscular once PRN Glucose LESS THAN 70 milligrams/deciliter  HYDROmorphone   Tablet 2 milliGRAM(s) Oral every 4 hours PRN Severe Pain (7 - 10)  nystatin Powder 1 Application(s) Topical two times a day PRN rash/itchiness      EXAM:  Vital Signs Last 24 Hrs  T(C): 36.8 (19 Nov 2018 06:08), Max: 37.1 (18 Nov 2018 18:01)  T(F): 98.2 (19 Nov 2018 06:08), Max: 98.7 (18 Nov 2018 18:01)  HR: 85 (19 Nov 2018 06:08) (85 - 99)  BP: 130/81 (19 Nov 2018 06:08) (129/80 - 145/87)  BP(mean): --  RR: 18 (19 Nov 2018 06:08) (18 - 18)  SpO2: 97% (19 Nov 2018 06:08) (97% - 97%)    GENERAL: The patient is awake and alert in no apparent distress.   LUNGS: scattered bilat crackles, no wheeze    LABS/IMAGING: reviewed             11-19    131<L>  |  94<L>  |  19  ----------------------------<  125<H>  3.7   |  23  |  0.35<L>    Ca    8.8      19 Nov 2018 06:33  Mg     1.4     11-19    < from: CT Chest No Cont (11.17.18 @ 20:47) >  IMPRESSION:     Debris-filled esophagus with secretions in the right main, left main, and   left lower lobar bronchi likely secondary to aspiration.    Moderate pulmonary edema with small bilateral pleural effusions.   Underlying pneumonia cannot be excluded.    < end of copied text >          PROBLEM LIST:  68y Female with HEALTH ISSUES - PROBLEM Dx:  PNA (pneumonia s/p abx  asthma not in exacerbation  dementia  CVA (cerebral vascular accident)  Hyponatremia  Acute hip pain, right  Other ulcerative colitis without complication  Depression  Anxiety  Pulmonary HTN    RECS:  -asthma: does not appear to be in exacerbation at this time, continue symbicort, spiriva, duoneb, daily prednisone  -s/p abx for pneumonia  -some ground glass on recent CT which could be fluid, can try gentle diuresis  -debris in airways/esophagus, likely aspiration tube feeds intermittently, keep head of bed elevated, aspiration precaution, NPO, GI follow up.  -pain control  -prognosis poor      Luciana Mancini MD   [phone]

## 2018-11-19 NOTE — CHART NOTE - NSCHARTNOTEFT_GEN_A_CORE
Prior auth for pantoprazole called to [phone], ID # [id number], Case # -[id number], spoke with Glenn who states case will be reviewed 24-72 hrs.  -D/w attending. Per attending Prior auth for pantoprazole called to [phone], ID # [id number], Case # -[id number], spoke with Glenn who states case will be reviewed 24-72 hrs.   -Per pt : pt does not have Fentanyl patches and clonazepam at home, and recent dispensed same said opioids were dispensed to rehab ISTOP # [id number]  -D/w attending above. Per attending can give nexium in the interim pending PA of protonix; and okay to escribe Fentanyl patches and clonazepam as pt was not home for 4 months, and admitted from rehab.  -D/w pt  and RN.    Ritu Morataya, NP Medicine [id number]

## 2018-11-19 NOTE — PROGRESS NOTE ADULT - REASON FOR ADMISSION
lethargy
altered mental status

## 2018-12-02 ENCOUNTER — INPATIENT (INPATIENT)
Facility: HOSPITAL | Age: 68
LOS: 44 days | Discharge: HOME | DRG: 291 | End: 2019-01-16
Attending: FAMILY MEDICINE | Admitting: FAMILY MEDICINE
Payer: MEDICARE

## 2018-12-02 VITALS
RESPIRATION RATE: 16 BRPM | OXYGEN SATURATION: 95 % | SYSTOLIC BLOOD PRESSURE: 143 MMHG | HEART RATE: 93 BPM | DIASTOLIC BLOOD PRESSURE: 85 MMHG | HEIGHT: 57 IN | WEIGHT: 100.09 LBS

## 2018-12-02 DIAGNOSIS — S42.293A OTHER DISPLACED FRACTURE OF UPPER END OF UNSPECIFIED HUMERUS, INITIAL ENCOUNTER FOR CLOSED FRACTURE: Chronic | ICD-10-CM

## 2018-12-02 DIAGNOSIS — R09.89 OTHER SPECIFIED SYMPTOMS AND SIGNS INVOLVING THE CIRCULATORY AND RESPIRATORY SYSTEMS: ICD-10-CM

## 2018-12-02 DIAGNOSIS — Z98.42 CATARACT EXTRACTION STATUS, LEFT EYE: Chronic | ICD-10-CM

## 2018-12-02 DIAGNOSIS — I50.9 HEART FAILURE, UNSPECIFIED: ICD-10-CM

## 2018-12-02 DIAGNOSIS — J45.909 UNSPECIFIED ASTHMA, UNCOMPLICATED: ICD-10-CM

## 2018-12-02 DIAGNOSIS — Z96.659 PRESENCE OF UNSPECIFIED ARTIFICIAL KNEE JOINT: Chronic | ICD-10-CM

## 2018-12-02 DIAGNOSIS — R13.10 DYSPHAGIA, UNSPECIFIED: ICD-10-CM

## 2018-12-02 DIAGNOSIS — R53.2 FUNCTIONAL QUADRIPLEGIA: ICD-10-CM

## 2018-12-02 DIAGNOSIS — R60.1 GENERALIZED EDEMA: ICD-10-CM

## 2018-12-02 DIAGNOSIS — Z90.710 ACQUIRED ABSENCE OF BOTH CERVIX AND UTERUS: Chronic | ICD-10-CM

## 2018-12-02 LAB
ALBUMIN SERPL ELPH-MCNC: 3.9 G/DL — SIGNIFICANT CHANGE UP (ref 3.3–5)
ALP SERPL-CCNC: 181 U/L — HIGH (ref 40–120)
ALT FLD-CCNC: 45 U/L — SIGNIFICANT CHANGE UP (ref 10–45)
ANION GAP SERPL CALC-SCNC: 14 MMOL/L — SIGNIFICANT CHANGE UP (ref 5–17)
APPEARANCE UR: ABNORMAL
APTT BLD: 34.1 SEC — SIGNIFICANT CHANGE UP (ref 27.5–36.3)
AST SERPL-CCNC: 29 U/L — SIGNIFICANT CHANGE UP (ref 10–40)
BACTERIA # UR AUTO: ABNORMAL
BASE EXCESS BLDV CALC-SCNC: 1.3 MMOL/L — SIGNIFICANT CHANGE UP (ref -2–2)
BASOPHILS # BLD AUTO: 0 K/UL — SIGNIFICANT CHANGE UP (ref 0–0.2)
BASOPHILS NFR BLD AUTO: 0.3 % — SIGNIFICANT CHANGE UP (ref 0–2)
BILIRUB SERPL-MCNC: 0.5 MG/DL — SIGNIFICANT CHANGE UP (ref 0.2–1.2)
BILIRUB UR-MCNC: NEGATIVE — SIGNIFICANT CHANGE UP
BUN SERPL-MCNC: 40 MG/DL — HIGH (ref 7–23)
CA-I SERPL-SCNC: 1.26 MMOL/L — SIGNIFICANT CHANGE UP (ref 1.12–1.3)
CALCIUM SERPL-MCNC: 9.5 MG/DL — SIGNIFICANT CHANGE UP (ref 8.4–10.5)
CHLORIDE BLDV-SCNC: 107 MMOL/L — SIGNIFICANT CHANGE UP (ref 96–108)
CHLORIDE SERPL-SCNC: 105 MMOL/L — SIGNIFICANT CHANGE UP (ref 96–108)
CO2 BLDV-SCNC: 27 MMOL/L — SIGNIFICANT CHANGE UP (ref 22–30)
CO2 SERPL-SCNC: 24 MMOL/L — SIGNIFICANT CHANGE UP (ref 22–31)
COLOR SPEC: YELLOW — SIGNIFICANT CHANGE UP
CREAT SERPL-MCNC: 0.54 MG/DL — SIGNIFICANT CHANGE UP (ref 0.5–1.3)
D DIMER BLD IA.RAPID-MCNC: 744 NG/ML DDU — HIGH
D DIMER BLD IA.RAPID-MCNC: 817 NG/ML DDU — HIGH
DIFF PNL FLD: ABNORMAL
EOSINOPHIL # BLD AUTO: 0.6 K/UL — HIGH (ref 0–0.5)
EOSINOPHIL NFR BLD AUTO: 5.3 % — SIGNIFICANT CHANGE UP (ref 0–6)
EPI CELLS # UR: 3 — SIGNIFICANT CHANGE UP
GAS PNL BLDV: 140 MMOL/L — SIGNIFICANT CHANGE UP (ref 136–145)
GAS PNL BLDV: SIGNIFICANT CHANGE UP
GLUCOSE BLDC GLUCOMTR-MCNC: 124 MG/DL — HIGH (ref 70–99)
GLUCOSE BLDV-MCNC: 101 MG/DL — HIGH (ref 70–99)
GLUCOSE SERPL-MCNC: 106 MG/DL — HIGH (ref 70–99)
GLUCOSE UR QL: NEGATIVE — SIGNIFICANT CHANGE UP
HCO3 BLDV-SCNC: 26 MMOL/L — SIGNIFICANT CHANGE UP (ref 21–29)
HCT VFR BLD CALC: 35.2 % — SIGNIFICANT CHANGE UP (ref 34.5–45)
HCT VFR BLDA CALC: 36 % — LOW (ref 39–50)
HGB BLD CALC-MCNC: 11.5 G/DL — SIGNIFICANT CHANGE UP (ref 11.5–15.5)
HGB BLD-MCNC: 11.5 G/DL — SIGNIFICANT CHANGE UP (ref 11.5–15.5)
INR BLD: 1.06 RATIO — SIGNIFICANT CHANGE UP (ref 0.88–1.16)
KETONES UR-MCNC: NEGATIVE — SIGNIFICANT CHANGE UP
LACTATE BLDV-MCNC: 1.9 MMOL/L — SIGNIFICANT CHANGE UP (ref 0.7–2)
LEUKOCYTE ESTERASE UR-ACNC: ABNORMAL
LYMPHOCYTES # BLD AUTO: 0.7 K/UL — LOW (ref 1–3.3)
LYMPHOCYTES # BLD AUTO: 5.5 % — LOW (ref 13–44)
MCHC RBC-ENTMCNC: 30.9 PG — SIGNIFICANT CHANGE UP (ref 27–34)
MCHC RBC-ENTMCNC: 32.7 GM/DL — SIGNIFICANT CHANGE UP (ref 32–36)
MCV RBC AUTO: 94.5 FL — SIGNIFICANT CHANGE UP (ref 80–100)
MONOCYTES # BLD AUTO: 0.7 K/UL — SIGNIFICANT CHANGE UP (ref 0–0.9)
MONOCYTES NFR BLD AUTO: 5.8 % — SIGNIFICANT CHANGE UP (ref 2–14)
NEUTROPHILS # BLD AUTO: 10.1 K/UL — HIGH (ref 1.8–7.4)
NEUTROPHILS NFR BLD AUTO: 83.2 % — HIGH (ref 43–77)
NITRITE UR-MCNC: POSITIVE
NT-PROBNP SERPL-SCNC: HIGH PG/ML (ref 0–300)
OTHER CELLS CSF MANUAL: 4 ML/DL — LOW (ref 18–22)
PCO2 BLDV: 44 MMHG — SIGNIFICANT CHANGE UP (ref 35–50)
PH BLDV: 7.39 — SIGNIFICANT CHANGE UP (ref 7.35–7.45)
PH UR: 7 — SIGNIFICANT CHANGE UP (ref 5–8)
PLATELET # BLD AUTO: 312 K/UL — SIGNIFICANT CHANGE UP (ref 150–400)
PO2 BLDV: <50 MMHG — LOW (ref 25–45)
POTASSIUM BLDV-SCNC: 4.3 MMOL/L — SIGNIFICANT CHANGE UP (ref 3.5–5.3)
POTASSIUM SERPL-MCNC: 4.7 MMOL/L — SIGNIFICANT CHANGE UP (ref 3.5–5.3)
POTASSIUM SERPL-SCNC: 4.7 MMOL/L — SIGNIFICANT CHANGE UP (ref 3.5–5.3)
PROT SERPL-MCNC: 6.5 G/DL — SIGNIFICANT CHANGE UP (ref 6–8.3)
PROT UR-MCNC: ABNORMAL
PROTHROM AB SERPL-ACNC: 12.2 SEC — SIGNIFICANT CHANGE UP (ref 10–12.9)
RAPID RVP RESULT: SIGNIFICANT CHANGE UP
RBC # BLD: 3.73 M/UL — LOW (ref 3.8–5.2)
RBC # FLD: 16.2 % — HIGH (ref 10.3–14.5)
RBC CASTS # UR COMP ASSIST: 10 /HPF — HIGH (ref 0–4)
SAO2 % BLDV: 23 % — LOW (ref 67–88)
SODIUM SERPL-SCNC: 143 MMOL/L — SIGNIFICANT CHANGE UP (ref 135–145)
SP GR SPEC: 1.02 — SIGNIFICANT CHANGE UP (ref 1.01–1.02)
TROPONIN T, HIGH SENSITIVITY RESULT: 141 NG/L — HIGH (ref 0–51)
TROPONIN T, HIGH SENSITIVITY RESULT: 172 NG/L — HIGH (ref 0–51)
UROBILINOGEN FLD QL: 1 — SIGNIFICANT CHANGE UP
WBC # BLD: 12.1 K/UL — HIGH (ref 3.8–10.5)
WBC # FLD AUTO: 12.1 K/UL — HIGH (ref 3.8–10.5)
WBC UR QL: 150 /HPF — HIGH (ref 0–5)

## 2018-12-02 PROCEDURE — 93010 ELECTROCARDIOGRAM REPORT: CPT

## 2018-12-02 PROCEDURE — 71045 X-RAY EXAM CHEST 1 VIEW: CPT | Mod: 26

## 2018-12-02 PROCEDURE — 99223 1ST HOSP IP/OBS HIGH 75: CPT

## 2018-12-02 PROCEDURE — 99285 EMERGENCY DEPT VISIT HI MDM: CPT | Mod: 25

## 2018-12-02 RX ORDER — NYSTATIN CREAM 100000 [USP'U]/G
1 CREAM TOPICAL
Qty: 0 | Refills: 0 | Status: DISCONTINUED | OUTPATIENT
Start: 2018-12-02 | End: 2019-01-16

## 2018-12-02 RX ORDER — MIRTAZAPINE 45 MG/1
7.5 TABLET, ORALLY DISINTEGRATING ORAL AT BEDTIME
Qty: 0 | Refills: 0 | Status: DISCONTINUED | OUTPATIENT
Start: 2018-12-02 | End: 2019-01-16

## 2018-12-02 RX ORDER — CLONAZEPAM 1 MG
1.5 TABLET ORAL AT BEDTIME
Qty: 0 | Refills: 0 | Status: DISCONTINUED | OUTPATIENT
Start: 2018-12-02 | End: 2018-12-09

## 2018-12-02 RX ORDER — GLUCAGON INJECTION, SOLUTION 0.5 MG/.1ML
1 INJECTION, SOLUTION SUBCUTANEOUS ONCE
Qty: 0 | Refills: 0 | Status: DISCONTINUED | OUTPATIENT
Start: 2018-12-02 | End: 2019-01-16

## 2018-12-02 RX ORDER — BUDESONIDE AND FORMOTEROL FUMARATE DIHYDRATE 160; 4.5 UG/1; UG/1
2 AEROSOL RESPIRATORY (INHALATION)
Qty: 0 | Refills: 0 | Status: DISCONTINUED | OUTPATIENT
Start: 2018-12-02 | End: 2019-01-16

## 2018-12-02 RX ORDER — SODIUM CHLORIDE 9 MG/ML
1000 INJECTION, SOLUTION INTRAVENOUS
Qty: 0 | Refills: 0 | Status: DISCONTINUED | OUTPATIENT
Start: 2018-12-02 | End: 2019-01-16

## 2018-12-02 RX ORDER — GABAPENTIN 400 MG/1
300 CAPSULE ORAL
Qty: 0 | Refills: 0 | Status: DISCONTINUED | OUTPATIENT
Start: 2018-12-02 | End: 2019-01-16

## 2018-12-02 RX ORDER — DEXTROSE 50 % IN WATER 50 %
25 SYRINGE (ML) INTRAVENOUS ONCE
Qty: 0 | Refills: 0 | Status: DISCONTINUED | OUTPATIENT
Start: 2018-12-02 | End: 2019-01-16

## 2018-12-02 RX ORDER — PANTOPRAZOLE SODIUM 20 MG/1
40 TABLET, DELAYED RELEASE ORAL
Qty: 0 | Refills: 0 | Status: DISCONTINUED | OUTPATIENT
Start: 2018-12-02 | End: 2018-12-02

## 2018-12-02 RX ORDER — GABAPENTIN 400 MG/1
300 CAPSULE ORAL
Qty: 0 | Refills: 0 | Status: DISCONTINUED | OUTPATIENT
Start: 2018-12-02 | End: 2018-12-02

## 2018-12-02 RX ORDER — ESOMEPRAZOLE MAGNESIUM 40 MG/1
1 CAPSULE, DELAYED RELEASE ORAL
Qty: 0 | Refills: 0 | COMMUNITY

## 2018-12-02 RX ORDER — FERROUS SULFATE 325(65) MG
300 TABLET ORAL DAILY
Qty: 0 | Refills: 0 | Status: DISCONTINUED | OUTPATIENT
Start: 2018-12-02 | End: 2019-01-16

## 2018-12-02 RX ORDER — DEXTROSE 50 % IN WATER 50 %
12.5 SYRINGE (ML) INTRAVENOUS ONCE
Qty: 0 | Refills: 0 | Status: DISCONTINUED | OUTPATIENT
Start: 2018-12-02 | End: 2019-01-16

## 2018-12-02 RX ORDER — TIOTROPIUM BROMIDE 18 UG/1
1 CAPSULE ORAL; RESPIRATORY (INHALATION) DAILY
Qty: 0 | Refills: 0 | Status: DISCONTINUED | OUTPATIENT
Start: 2018-12-02 | End: 2018-12-29

## 2018-12-02 RX ORDER — ACETAMINOPHEN 500 MG
680 TABLET ORAL EVERY 6 HOURS
Qty: 0 | Refills: 0 | Status: DISCONTINUED | OUTPATIENT
Start: 2018-12-02 | End: 2019-01-16

## 2018-12-02 RX ORDER — IPRATROPIUM/ALBUTEROL SULFATE 18-103MCG
3 AEROSOL WITH ADAPTER (GRAM) INHALATION EVERY 6 HOURS
Qty: 0 | Refills: 0 | Status: DISCONTINUED | OUTPATIENT
Start: 2018-12-02 | End: 2018-12-29

## 2018-12-02 RX ORDER — DEXTROSE 50 % IN WATER 50 %
15 SYRINGE (ML) INTRAVENOUS ONCE
Qty: 0 | Refills: 0 | Status: DISCONTINUED | OUTPATIENT
Start: 2018-12-02 | End: 2019-01-16

## 2018-12-02 RX ORDER — PANTOPRAZOLE SODIUM 20 MG/1
40 TABLET, DELAYED RELEASE ORAL
Qty: 0 | Refills: 0 | Status: DISCONTINUED | OUTPATIENT
Start: 2018-12-02 | End: 2019-01-16

## 2018-12-02 RX ORDER — CLOPIDOGREL BISULFATE 75 MG/1
75 TABLET, FILM COATED ORAL DAILY
Qty: 0 | Refills: 0 | Status: DISCONTINUED | OUTPATIENT
Start: 2018-12-02 | End: 2018-12-15

## 2018-12-02 RX ORDER — INSULIN LISPRO 100/ML
VIAL (ML) SUBCUTANEOUS EVERY 6 HOURS
Qty: 0 | Refills: 0 | Status: DISCONTINUED | OUTPATIENT
Start: 2018-12-02 | End: 2019-01-16

## 2018-12-02 RX ORDER — FENTANYL CITRATE 50 UG/ML
1 INJECTION INTRAVENOUS
Qty: 0 | Refills: 0 | Status: DISCONTINUED | OUTPATIENT
Start: 2018-12-02 | End: 2018-12-09

## 2018-12-02 RX ORDER — MINOCYCLINE HYDROCHLORIDE 45 MG/1
100 TABLET, EXTENDED RELEASE ORAL
Qty: 0 | Refills: 0 | Status: DISCONTINUED | OUTPATIENT
Start: 2018-12-02 | End: 2018-12-03

## 2018-12-02 RX ORDER — ENOXAPARIN SODIUM 100 MG/ML
50 INJECTION SUBCUTANEOUS ONCE
Qty: 0 | Refills: 0 | Status: COMPLETED | OUTPATIENT
Start: 2018-12-02 | End: 2018-12-02

## 2018-12-02 RX ORDER — QUETIAPINE FUMARATE 200 MG/1
25 TABLET, FILM COATED ORAL AT BEDTIME
Qty: 0 | Refills: 0 | Status: DISCONTINUED | OUTPATIENT
Start: 2018-12-02 | End: 2019-01-16

## 2018-12-02 RX ORDER — SERTRALINE 25 MG/1
50 TABLET, FILM COATED ORAL DAILY
Qty: 0 | Refills: 0 | Status: DISCONTINUED | OUTPATIENT
Start: 2018-12-02 | End: 2019-01-01

## 2018-12-02 RX ORDER — ENOXAPARIN SODIUM 100 MG/ML
45 INJECTION SUBCUTANEOUS ONCE
Qty: 0 | Refills: 0 | Status: DISCONTINUED | OUTPATIENT
Start: 2018-12-02 | End: 2018-12-02

## 2018-12-02 RX ORDER — HEPARIN SODIUM 5000 [USP'U]/ML
5000 INJECTION INTRAVENOUS; SUBCUTANEOUS EVERY 12 HOURS
Qty: 0 | Refills: 0 | Status: DISCONTINUED | OUTPATIENT
Start: 2018-12-02 | End: 2018-12-02

## 2018-12-02 RX ADMIN — GABAPENTIN 300 MILLIGRAM(S): 400 CAPSULE ORAL at 22:42

## 2018-12-02 RX ADMIN — Medication 1.5 MILLIGRAM(S): at 22:42

## 2018-12-02 RX ADMIN — Medication 200 MILLIGRAM(S): at 22:57

## 2018-12-02 RX ADMIN — MIRTAZAPINE 7.5 MILLIGRAM(S): 45 TABLET, ORALLY DISINTEGRATING ORAL at 22:42

## 2018-12-02 RX ADMIN — ENOXAPARIN SODIUM 50 MILLIGRAM(S): 100 INJECTION SUBCUTANEOUS at 22:57

## 2018-12-02 RX ADMIN — MINOCYCLINE HYDROCHLORIDE 100 MILLIGRAM(S): 45 TABLET, EXTENDED RELEASE ORAL at 22:43

## 2018-12-02 RX ADMIN — QUETIAPINE FUMARATE 25 MILLIGRAM(S): 200 TABLET, FILM COATED ORAL at 22:42

## 2018-12-02 NOTE — ED PROVIDER NOTE - ATTENDING CONTRIBUTION TO CARE
Patient is a 69 yo F with history of alzhemier's dementia here sent in for evaluation for shortness of breath and abnormal outpatient proBNP of 50,000. Patient has an extensive history including CVA, hyperlipidemia, pulmonary HTN here for shortness of breath while sitting in wheel chair at home. She is on a water pill, has been taking it. She was recently admitted in November for pneumonia. Patient is bed bound, provides no history on her own. History provided by . No known fevers, coughing. She has a gavin in place and per  has a carbapenem resistent UTI and is on Abx. Patient symptomatic with sob with minimal movement, re-positioning in bed.     VS noted  Gen. elderly, chronically ill  HEENT: EOMI, mmm   Lungs: crackles in lower lobes  CVS: RRR   Abd; Soft non tender, non distended, PEG tube in place  Ext: pedal edema  Skin: no rash  Neuro awake non focal   a/p: sob, elevated pro-BNP, concern for fluid retention - labs, CXR, admit.   - Francisco BROWN

## 2018-12-02 NOTE — H&P ADULT - ATTENDING COMMENTS
Plan of care extensively discussed with the patient's  and 2 daughters- they understand and agree with the plan.

## 2018-12-02 NOTE — ED ADULT NURSE NOTE - OBJECTIVE STATEMENT
69 yo F aaox1 baseline BIBEMS. For abnormal lab result. History provided by spouse. Pt was found to have leg swelling while sitting in wheel chair few days   ago. PCP sent Pro BNP test to evaluate for CHF. Lab result returned at "[id number]" as per Family PCP advised for pt to be brought to ED for further evaluation. Pt has had multiple admissions here in ED, last admission in November for pneumonia. Pt returned home with family. PMH CVA, Fatty, Pancreas, Pneumonia, PEG Tube. IV line placed labs drawn. Pt spo2 97 % RA. EKG performed. Flu swab sent.

## 2018-12-02 NOTE — H&P ADULT - NSHPLABSRESULTS_GEN_ALL_CORE
Labs reviewed:   Mild leukocytosis 12.1  labs concentrated overall: Hg 11.5 (up from 9.8 baseline), Elevated BUN/Cr ratio 40/0.54. Baseline Cr is 0.35. Albumin also elevated for her at 3.9 (recent baseline 2.8). RVP negative. proBNP elevated 40,000    CXR personally reviewed: difficult study to interpret given positioning. May have mild pulmonary edema.     ECG ordered, pending

## 2018-12-02 NOTE — ED PROVIDER NOTE - OBJECTIVE STATEMENT
Patient is a 68 year old female with advanced Alzheimers dementia (PEG tube in place, chronic gavin catheter in place), bed bound with muscle spasms and sacral decubitus ulcers/ b/l heel ulcers, anxiety, depression, asthma on prednisone, HLD, CVA, UC, right prosthetic hip MRSA infection in 7/2018 presents c/o elevated BNP. Per patients son patient w/ recent admission d/c on 11/19/18 for metabolic encephalopathy 2' complicated cystitis given chronic gavin catheter as well as aspiration PNA. She was d/c to home and per her son has had continued lethargy. Was seen by home PCP 5 days ago where labs and cxr was performed. CXR reported negative. Received call today that pro-bnp was elevated. Per son pt appears to be sob when laying flat and patient endorses sob as well. Denies other complaints. Patient is a 68 year old female with advanced Alzheimers dementia (PEG tube in place, chronic gavin catheter in place), bed bound with muscle spasms and sacral decubitus ulcers/ b/l heel ulcers, anxiety, depression, asthma on prednisone, HLD, CVA, UC, right prosthetic hip MRSA infection in 7/2018 presents c/o elevated BNP. Per patients family patient w/ recent admission d/c on 11/19/18 for metabolic encephalopathy 2' complicated cystitis given chronic gavin catheter as well as aspiration PNA. She was d/c to home and per her son has had continued lethargy. Was seen by home PCP 5 days ago where labs and cxr was performed. CXR reported negative. Received call today that pro-bnp was elevated. Per family pt appears to be sob when laying flat and patient endorses sob as well. Denies other complaints. Patient is a 68 year old female with advanced Alzheimers dementia (PEG tube in place, chronic gavin catheter in place), bed bound with muscle spasms and sacral decubitus ulcers/ b/l heel ulcers, anxiety, depression, asthma on prednisone, HLD, CVA, UC, right prosthetic hip MRSA infection in 7/2018 presents c/o elevated BNP. Per patients family patient w/ recent admission d/c on 11/19/18 for metabolic encephalopathy 2' complicated cystitis given chronic gavin catheter as well as aspiration PNA. She was d/c to home and per her family has had continued lethargy. Was seen by home PCP 5 days ago where labs and cxr was performed. CXR reported negative. Received call today that pro-bnp was elevated. Per family pt appears to be sob when laying flat and patient endorses sob as well. Denies other complaints.

## 2018-12-02 NOTE — H&P ADULT - HISTORY OF PRESENT ILLNESS
History obtained from  and 2 daughters at bedside;  patient is nonverbal due to advanced dementia.    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 Per the family, her breathing is normal (somewhat SOB at baseline). Her PMD 5 days ago got some blood tests sent off which resulted in an elevated proBNP. She was sent in for an evaluation. They deny fevers, chills, coughing, skin rashes. They have been feeding her via the PEG on time, and have attempted to flush with water as instructed. At times, when her residuals ate elevated, they would back off from the extra flushes. Vital signs in ED: HR 93, /85, RR 16, 95-97% RA.

## 2018-12-02 NOTE — ED PROVIDER NOTE - PROGRESS NOTE DETAILS
Spoke to Dr. Bolden patients pmd and previous admission doctor. Discussed case and informed that cards was consulted regarding diuretics. States agrees w/ no abx at this time, more concerned for CHF exacerbation. Asks that hospitalist write H&P, will see pt in morning   Linda Naranjo PA-C

## 2018-12-02 NOTE — H&P ADULT - NSHPSOCIALHISTORY_GEN_ALL_CORE
, lives at home. She's taken care of by her  and children.   They use a pablo lift to get her out of bed once a day for about 3-4 hours/ day.   No h/o smoking or alcohol use.

## 2018-12-02 NOTE — H&P ADULT - PROBLEM SELECTOR PLAN 1
Etiology of edema is likely due to low oncotic pressure from low albumin; however other etiologies need to be explored given elevated proBNP. hsTrop are elevated but decreasing- she may have had an MI recently.  Check TTE- r/o new CHF  Check LE dopplers- r/o DVT.   Check d-dimer, if >680 will need to obtain VQ scan. She has an IV dye allergy (can't do CT angio).  Hold off on initiating diuresis given lab evidence of dehydration/ hemoconcentration, elevated BUN/Cr, and concentrated albumin compared to baseline.   Cardiology consult called by patient's primary doctor.

## 2018-12-02 NOTE — H&P ADULT - ADDITIONAL PE
heel pressure ulcers, sacral ulcer present on admission - full skin assessment to be done by nurse once the patient moves from the ED to a medical floor bed.

## 2018-12-02 NOTE — H&P ADULT - ASSESSMENT
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

## 2018-12-03 DIAGNOSIS — I50.20 UNSPECIFIED SYSTOLIC (CONGESTIVE) HEART FAILURE: ICD-10-CM

## 2018-12-03 DIAGNOSIS — R73.02 IMPAIRED GLUCOSE TOLERANCE (ORAL): ICD-10-CM

## 2018-12-03 LAB
-  COAGULASE NEGATIVE STAPHYLOCOCCUS: SIGNIFICANT CHANGE UP
ANION GAP SERPL CALC-SCNC: 16 MMOL/L — SIGNIFICANT CHANGE UP (ref 5–17)
BASOPHILS # BLD AUTO: 0.03 K/UL — SIGNIFICANT CHANGE UP (ref 0–0.2)
BASOPHILS NFR BLD AUTO: 0.3 % — SIGNIFICANT CHANGE UP (ref 0–2)
BUN SERPL-MCNC: 41 MG/DL — HIGH (ref 7–23)
CALCIUM SERPL-MCNC: 9.1 MG/DL — SIGNIFICANT CHANGE UP (ref 8.4–10.5)
CHLORIDE SERPL-SCNC: 107 MMOL/L — SIGNIFICANT CHANGE UP (ref 96–108)
CO2 SERPL-SCNC: 21 MMOL/L — LOW (ref 22–31)
CREAT SERPL-MCNC: 0.54 MG/DL — SIGNIFICANT CHANGE UP (ref 0.5–1.3)
EOSINOPHIL # BLD AUTO: 0.6 K/UL — HIGH (ref 0–0.5)
EOSINOPHIL NFR BLD AUTO: 5.2 % — SIGNIFICANT CHANGE UP (ref 0–6)
GLUCOSE BLDC GLUCOMTR-MCNC: 126 MG/DL — HIGH (ref 70–99)
GLUCOSE BLDC GLUCOMTR-MCNC: 126 MG/DL — HIGH (ref 70–99)
GLUCOSE BLDC GLUCOMTR-MCNC: 139 MG/DL — HIGH (ref 70–99)
GLUCOSE BLDC GLUCOMTR-MCNC: 157 MG/DL — HIGH (ref 70–99)
GLUCOSE SERPL-MCNC: 103 MG/DL — HIGH (ref 70–99)
GRAM STN FLD: SIGNIFICANT CHANGE UP
HCT VFR BLD CALC: 30.6 % — LOW (ref 34.5–45)
HGB BLD-MCNC: 9.7 G/DL — LOW (ref 11.5–15.5)
IMM GRANULOCYTES NFR BLD AUTO: 0.3 % — SIGNIFICANT CHANGE UP (ref 0–1.5)
LYMPHOCYTES # BLD AUTO: 1.03 K/UL — SIGNIFICANT CHANGE UP (ref 1–3.3)
LYMPHOCYTES # BLD AUTO: 9 % — LOW (ref 13–44)
MAGNESIUM SERPL-MCNC: 1.9 MG/DL — SIGNIFICANT CHANGE UP (ref 1.6–2.6)
MAGNESIUM SERPL-MCNC: 2.1 MG/DL — SIGNIFICANT CHANGE UP (ref 1.6–2.6)
MCHC RBC-ENTMCNC: 30.1 PG — SIGNIFICANT CHANGE UP (ref 27–34)
MCHC RBC-ENTMCNC: 31.7 GM/DL — LOW (ref 32–36)
MCV RBC AUTO: 95 FL — SIGNIFICANT CHANGE UP (ref 80–100)
METHOD TYPE: SIGNIFICANT CHANGE UP
MONOCYTES # BLD AUTO: 0.9 K/UL — SIGNIFICANT CHANGE UP (ref 0–0.9)
MONOCYTES NFR BLD AUTO: 7.9 % — SIGNIFICANT CHANGE UP (ref 2–14)
NEUTROPHILS # BLD AUTO: 8.84 K/UL — HIGH (ref 1.8–7.4)
NEUTROPHILS NFR BLD AUTO: 77.3 % — HIGH (ref 43–77)
PHOSPHATE SERPL-MCNC: 3.5 MG/DL — SIGNIFICANT CHANGE UP (ref 2.5–4.5)
PHOSPHATE SERPL-MCNC: 3.7 MG/DL — SIGNIFICANT CHANGE UP (ref 2.5–4.5)
PLATELET # BLD AUTO: 286 K/UL — SIGNIFICANT CHANGE UP (ref 150–400)
POTASSIUM SERPL-MCNC: 4.5 MMOL/L — SIGNIFICANT CHANGE UP (ref 3.5–5.3)
POTASSIUM SERPL-SCNC: 4.5 MMOL/L — SIGNIFICANT CHANGE UP (ref 3.5–5.3)
RBC # BLD: 3.22 M/UL — LOW (ref 3.8–5.2)
RBC # FLD: 17.5 % — HIGH (ref 10.3–14.5)
SODIUM SERPL-SCNC: 144 MMOL/L — SIGNIFICANT CHANGE UP (ref 135–145)
WBC # BLD: 11.43 K/UL — HIGH (ref 3.8–10.5)
WBC # FLD AUTO: 11.43 K/UL — HIGH (ref 3.8–10.5)

## 2018-12-03 PROCEDURE — 99222 1ST HOSP IP/OBS MODERATE 55: CPT

## 2018-12-03 PROCEDURE — 78582 LUNG VENTILAT&PERFUS IMAGING: CPT | Mod: 26

## 2018-12-03 PROCEDURE — 93970 EXTREMITY STUDY: CPT | Mod: 26

## 2018-12-03 PROCEDURE — 93306 TTE W/DOPPLER COMPLETE: CPT | Mod: 26

## 2018-12-03 RX ORDER — VANCOMYCIN HCL 1 G
1000 VIAL (EA) INTRAVENOUS EVERY 12 HOURS
Qty: 0 | Refills: 0 | Status: DISCONTINUED | OUTPATIENT
Start: 2018-12-03 | End: 2018-12-04

## 2018-12-03 RX ORDER — TIZANIDINE 4 MG/1
4 TABLET ORAL
Qty: 0 | Refills: 0 | Status: DISCONTINUED | OUTPATIENT
Start: 2018-12-03 | End: 2019-01-16

## 2018-12-03 RX ORDER — DIPHENHYDRAMINE HCL 50 MG
50 CAPSULE ORAL ONCE
Qty: 0 | Refills: 0 | Status: DISCONTINUED | OUTPATIENT
Start: 2018-12-03 | End: 2018-12-03

## 2018-12-03 RX ORDER — VANCOMYCIN HCL 1 G
1000 VIAL (EA) INTRAVENOUS ONCE
Qty: 0 | Refills: 0 | Status: DISCONTINUED | OUTPATIENT
Start: 2018-12-03 | End: 2018-12-03

## 2018-12-03 RX ORDER — FUROSEMIDE 40 MG
40 TABLET ORAL ONCE
Qty: 0 | Refills: 0 | Status: COMPLETED | OUTPATIENT
Start: 2018-12-03 | End: 2018-12-03

## 2018-12-03 RX ORDER — DIPHENHYDRAMINE HCL 50 MG
50 CAPSULE ORAL ONCE
Qty: 0 | Refills: 0 | Status: COMPLETED | OUTPATIENT
Start: 2018-12-03 | End: 2018-12-03

## 2018-12-03 RX ADMIN — Medication 50 MILLIGRAM(S): at 23:54

## 2018-12-03 RX ADMIN — BUDESONIDE AND FORMOTEROL FUMARATE DIHYDRATE 2 PUFF(S): 160; 4.5 AEROSOL RESPIRATORY (INHALATION) at 06:12

## 2018-12-03 RX ADMIN — TIOTROPIUM BROMIDE 1 CAPSULE(S): 18 CAPSULE ORAL; RESPIRATORY (INHALATION) at 13:04

## 2018-12-03 RX ADMIN — Medication 300 MILLIGRAM(S): at 13:04

## 2018-12-03 RX ADMIN — Medication 1 APPLICATION(S): at 17:37

## 2018-12-03 RX ADMIN — Medication 200 MILLIGRAM(S): at 14:36

## 2018-12-03 RX ADMIN — SERTRALINE 50 MILLIGRAM(S): 25 TABLET, FILM COATED ORAL at 13:06

## 2018-12-03 RX ADMIN — TIZANIDINE 4 MILLIGRAM(S): 4 TABLET ORAL at 14:36

## 2018-12-03 RX ADMIN — Medication 200 MILLIGRAM(S): at 22:05

## 2018-12-03 RX ADMIN — MIRTAZAPINE 7.5 MILLIGRAM(S): 45 TABLET, ORALLY DISINTEGRATING ORAL at 22:07

## 2018-12-03 RX ADMIN — Medication 200 MILLIGRAM(S): at 17:37

## 2018-12-03 RX ADMIN — Medication 40 MILLIGRAM(S): at 06:52

## 2018-12-03 RX ADMIN — TIZANIDINE 4 MILLIGRAM(S): 4 TABLET ORAL at 22:06

## 2018-12-03 RX ADMIN — GABAPENTIN 300 MILLIGRAM(S): 400 CAPSULE ORAL at 06:10

## 2018-12-03 RX ADMIN — Medication 7.5 MILLIGRAM(S): at 13:04

## 2018-12-03 RX ADMIN — Medication 3 MILLILITER(S): at 13:05

## 2018-12-03 RX ADMIN — BUDESONIDE AND FORMOTEROL FUMARATE DIHYDRATE 2 PUFF(S): 160; 4.5 AEROSOL RESPIRATORY (INHALATION) at 17:37

## 2018-12-03 RX ADMIN — Medication 1 APPLICATION(S): at 06:11

## 2018-12-03 RX ADMIN — GABAPENTIN 300 MILLIGRAM(S): 400 CAPSULE ORAL at 17:37

## 2018-12-03 RX ADMIN — FENTANYL CITRATE 1 PATCH: 50 INJECTION INTRAVENOUS at 17:36

## 2018-12-03 RX ADMIN — Medication 1.5 MILLIGRAM(S): at 22:07

## 2018-12-03 RX ADMIN — Medication 250 MILLIGRAM(S): at 22:08

## 2018-12-03 RX ADMIN — MINOCYCLINE HYDROCHLORIDE 100 MILLIGRAM(S): 45 TABLET, EXTENDED RELEASE ORAL at 06:09

## 2018-12-03 RX ADMIN — CLOPIDOGREL BISULFATE 75 MILLIGRAM(S): 75 TABLET, FILM COATED ORAL at 13:06

## 2018-12-03 RX ADMIN — QUETIAPINE FUMARATE 25 MILLIGRAM(S): 200 TABLET, FILM COATED ORAL at 22:08

## 2018-12-03 RX ADMIN — PANTOPRAZOLE SODIUM 40 MILLIGRAM(S): 20 TABLET, DELAYED RELEASE ORAL at 06:10

## 2018-12-03 RX ADMIN — Medication 3 MILLILITER(S): at 22:21

## 2018-12-03 RX ADMIN — Medication 200 MILLIGRAM(S): at 06:11

## 2018-12-03 RX ADMIN — Medication 3 MILLILITER(S): at 06:11

## 2018-12-03 RX ADMIN — Medication 200 MILLIGRAM(S): at 13:05

## 2018-12-03 NOTE — CONSULT NOTE ADULT - SUBJECTIVE AND OBJECTIVE BOX
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 As per her  she has  home visits with her PCP and he had noticed increased LE edema, he did an outpatient home workup with labs and ECHO.  The BNP resulted significantly elevated, ECHO read is pending.  He called and asked for her to come to the ED.   As per her  he denies  fevers, chills, coughing, skin rashes.   Of note she has a charted sulfa allergy, as per her  this is an old allery with a rash to sulfa abx,  he is unsure if she has had Lasix in the past  Vital signs in ED: HR 93, /85, RR 16, 95-97% RA.       Allergies    ASA; dye contrast (Anaphylaxis)  aspirin (Short breath)  divalproex sodium (Other (Unknown))  Haldol (Other (Unknown))  penicillin (Short breath; Rash)  sulfa drugs (Short breath; Rash)  Xanax (Other (Unknown))    Intolerances    	    PAST MEDICAL & SURGICAL HISTORY:  CVA (cerebral vascular accident)  Fatty pancreas  PNA (pneumonia)  Pulmonary HTN  IGT (impaired glucose tolerance)  Ulcerative colitis  Acid reflux  Anxiety  Depression  Mouth sores  HLD (hyperlipidemia)  Asthma  Humeral head fracture  H/O: hysterectomy  H/O cataract extraction, left  History of knee replacement      FAMILY HISTORY:  Family history of dementia (Grandparent)  Family history of colon cancer (Grandparent)      SOCIAL HISTORY:    [ ] Non-smoker  [ ] Smoker  [ ] Alcohol      REVIEW OF SYSTEMS: as per her  -limited due to the state of the patient  CONSTITUTIONAL: No fever, + weight loss, or fatigue  EYES: No eye pain, visual disturbances, or discharge  ENMT:  No difficulty hearing, tinnitus, vertigo; No sinus or throat pain  NECK: No pain or stiffness  RESPIRATORY: No cough, wheezing, chills or hemoptysis; + Shortness of Breath, no change from her baseline  CARDIOVASCULAR: No chest pain, palpitations, passing out, dizziness, + leg swelling  GASTROINTESTINAL: No abdominal or epigastric pain. No nausea, vomiting, or hematemesis; No diarrhea or constipation. No melena or hematochezia.  GENITOURINARY: No dysuria, frequency, hematuria, or incontinence  NEUROLOGICAL: No headaches, memory loss, loss of strength, numbness, or tremors  SKIN: No itching, burning, rashes, or lesions   LYMPH Nodes: No enlarged glands  ENDOCRINE: No heat or cold intolerance; No hair loss  MUSCULOSKELETAL: No joint pain or swelling; No muscle, back, or extremity pain  PSYCHIATRIC: No depression, anxiety, mood swings, or difficulty sleeping  HEME/LYMPH: No easy bruising, or bleeding gums  ALLERY AND IMMUNOLOGIC: No hives or eczema	    [ ] All others negative	  [ ] Unable to obtain    PHYSICAL EXAM:  I&O's Summary    Vital Signs Last 24 Hrs  T(C): 36.4 (02 Dec 2018 23:43), Max: 37.4 (02 Dec 2018 13:25)  T(F): 97.6 (02 Dec 2018 23:43), Max: 99.3 (02 Dec 2018 13:25)  HR: 95 (02 Dec 2018 23:43) (82 - 117)  BP: 138/84 (02 Dec 2018 23:43) (138/84 - 165/70)  BP(mean): --  RR: 20 (02 Dec 2018 23:43) (16 - 23)  SpO2: 98% (02 Dec 2018 23:43) (95% - 98%)    Appearance: Thin Eld F lying in bed + contracted NAD	  HEENT:   Normal oral mucosa, PERRL, EOMI	  Lymphatic: No lymphadenopathy  Cardiovascular: Normal S1 S2, No JVD, 2/6 murmur, +2 Le edema  Respiratory: + crackles	  Psychiatry: Awake, nonverbal  Gastrointestinal:  Soft, Non-tender, + BS	  Skin: No rashes, No ecchymoses, No cyanosis	  Neurologic: Non-focal  Extremities:, No clubbing, cyanosis , +2 Le pitting edemaedema  Vascular: Peripheral pulses palpable 2+ bilaterally    MEDICATIONS:  Home Medications:  acetaminophen 160 mg/5 mL oral suspension: milliliter(s) by gastrostomy tube , As Needed - 3)  (02 Dec 2018 19:15)  clonazePAM 0.5 mg oral tablet: 3 tab(s) by gastrostomy tube once a day (at bedtime) (02 Dec 2018 19:15)  ergocalciferol: 1 milliliter(s) by gastrostomy tube once a day (02 Dec 2018 19:15)  freetext medication  - controlled substance: 1 milliliter(s) sublingual 3 times a day -up with your primary care physician for script (02 Dec 2018 19:15)  gabapentin 600 mg oral tablet: 0.5 tab(s) by gastrostomy tube 2 times a day (02 Dec 2018 19:15)  Klor-Con 10 mEq oral tablet, extended release: 1 tab(s) by gastrostomy tube every other day (02 Dec 2018 19:15)  metFORMIN 500 mg oral tablet: 1 tab(s) by gastrostomy tube once a day (at bedtime) (02 Dec 2018 19:15)  minocycline 100 mg oral tablet: 1 tab(s) by gastrostomy tube every 12 hours (02 Dec 2018 19:15)  nebulizer solution: 1 dose(s) by nebulizer every 4 hours, As Needed (02 Dec 2018 19:13)  PriLOSEC OTC 20 mg oral delayed release tablet: 2 tab(s) by gastrostomy tube once a day (02 Dec 2018 19:15)  Tussin 100 mg/5 mL oral liquid: 30 milliliter(s) by gastrostomy tube 2 times a day (02 Dec 2018 19:15)    MEDICATIONS  (STANDING):  ALBUTerol/ipratropium for Nebulization 3 milliLiter(s) Nebulizer every 6 hours  buDESOnide 160 MICROgram(s)/formoterol 4.5 MICROgram(s) Inhaler 2 Puff(s) Inhalation two times a day  clobetasol 0.05% Ointment 1 Application(s) Topical two times a day  clonazePAM Tablet 1.5 milliGRAM(s) Oral at bedtime  clopidogrel Tablet 75 milliGRAM(s) Oral daily  dextrose 5%. 1000 milliLiter(s) (50 mL/Hr) IV Continuous <Continuous>  dextrose 50% Injectable 12.5 Gram(s) IV Push once  dextrose 50% Injectable 25 Gram(s) IV Push once  dextrose 50% Injectable 25 Gram(s) IV Push once  fentaNYL   Patch  12 MICROgram(s)/Hr 1 Patch Transdermal every 72 hours  ferrous    sulfate Liquid 300 milliGRAM(s) Enteral Tube daily  gabapentin   Solution 300 milliGRAM(s) Oral two times a day  guaiFENesin    Syrup 200 milliGRAM(s) Oral every 2 hours  insulin lispro (HumaLOG) corrective regimen sliding scale   SubCutaneous every 6 hours  medical marijuana oil 1 milliLiter(s) 1 milliLiter(s) SubLingual <User Schedule>  minocycline 100 milliGRAM(s) Oral two times a day  mirtazapine 7.5 milliGRAM(s) Oral at bedtime  pantoprazole   Suspension 40 milliGRAM(s) Oral before breakfast  predniSONE   Tablet 7.5 milliGRAM(s) Oral <User Schedule>  QUEtiapine 25 milliGRAM(s) Oral at bedtime  sertraline 50 milliGRAM(s) Oral daily  tiotropium 18 MICROgram(s) Capsule 1 Capsule(s) Inhalation daily    MEDICATIONS  (PRN):  acetaminophen    Suspension .. 680 milliGRAM(s) Oral every 6 hours PRN Temp greater or equal to 38C (100.4F), Mild Pain (1 - 3)  dextrose 40% Gel 15 Gram(s) Oral once PRN Blood Glucose LESS THAN 70 milliGRAM(s)/deciliter  glucagon  Injectable 1 milliGRAM(s) IntraMuscular once PRN Glucose LESS THAN 70 milligrams/deciliter  nystatin Powder 1 Application(s) Topical two times a day PRN rash/itchiness      LABS:	 	    CBC Full  -  ( 02 Dec 2018 14:33 )  WBC Count : 12.1 K/uL  Hemoglobin : 11.5 g/dL  Hematocrit : 35.2 %  Platelet Count - Automated : 312 K/uL  Mean Cell Volume : 94.5 fl  Mean Cell Hemoglobin : 30.9 pg  Mean Cell Hemoglobin Concentration : 32.7 gm/dL  Auto Neutrophil # : 10.1 K/uL  Auto Lymphocyte # : 0.7 K/uL  Auto Monocyte # : 0.7 K/uL  Auto Eosinophil # : 0.6 K/uL  Auto Basophil # : 0.0 K/uL  Auto Neutrophil % : 83.2 %  Auto Lymphocyte % : 5.5 %  Auto Monocyte % : 5.8 %  Auto Eosinophil % : 5.3 %  Auto Basophil % : 0.3 %    12-02    143  |  105  |  40<H>  ----------------------------<  106<H>  4.7   |  24  |  0.54    Ca    9.5      02 Dec 2018 14:50    TPro  6.5  /  Alb  3.9  /  TBili  0.5  /  DBili  x   /  AST  29  /  ALT  45  /  AlkPhos  181<H>  12-02      proBNP: 99404  Lipid Profile:   HgA1c:   TSH:   D-Dimer elevated  TROPONIN:172-->141        TELEMETRY: 	  No events  ECG:  	SR LT axis  RADIOLOGY: CXR increased vasc congestion, rotated  OTHER: 	    CARDIAC TESTING/STUDIES:    [ ] Echocardiogram: images reviewed TTE from 7/29/18 with global hypokinesis that improved on TTE 8/18/18 to moderate LV systolic dysfxn, mild-mod MR, aortic sclerosis  [ ]  Catheterization:  [ ] Stress Test:  	  	  ASSESSMENT/PLAN: 	      MYRIAM HEATH is a 68y Female patient PMH prior stress CM with sepsis with EF recovery (7-8/2018), advanced dementia (nonverbal, dysphagia with PEG tube, bedbound- functional quadriplegia, chronic gavin catheter in place), sacral pressure ulcer stage 3, heel pressure ulcers, asthma on prednisone, HLD, CVA, UC, 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   Physical exam does demonstrate volume overload and edema,  it is possible she has further stress CM,  would like to know the results of her recent outpatient ECHO but will plan for further evaluation with inpatient ECHO as well as diuresis.  She has a charted "old" sulfa allergy of a rash to sulfa Abx, sulfa abx allergies tend not to cross react with lasix, her  is unsure if she has had lasix in the past. would like to begin diuresis with ethacrynic acid tonight.  Will consult pharmacy for a full med chart review to determine if she has had and tolerated lasix in the past.  May also consider allergy consult  Labs are significant for elevated troponin which is probably secondary to the CHF exacerbation.  will treat medically and trend troponin      CHF, acute decompensated.   -begin diuresis with 50 mg ethacrynic acid via PEG, for net negative 1-2L daily  onsult pharmacy for a full med chart review to determine if she has had and tolerated lasix in the past.     consider allergy consult  -monitor ins/outs  -check daily standing weights  -check ECHO    elevated troponin  contiue plavix  -trend troponin until downtrending x2  -monitor on telemetry  -serial EKGs  -no ASA as she has ASA allergy    Cardiology will follow    Corwin Rod MD, PhD  Cardiology Attending  [facility]  [phone]    (Cardiology Nocturnist cell number available 7 pm - 7 am every night; available daytime week days for follow-up only; daytime weekends covered by general cardiology consult service)

## 2018-12-03 NOTE — PROGRESS NOTE ADULT - SUBJECTIVE AND OBJECTIVE BOX
---___---___---___---___---___---___ ---___---___---___---___---___---___---___---___---___---                    <<<  M E D I C A L   A T T E N D I N G    F O L L O W    U P   N O T E  >>>    - Patient seen and examined by me approximately thirty minutes ago.  - In summary, patient is a 68y year old Female who originally presented with elevted pro bnp and sent in to er   - Patient today overall doing ok, comfortable, eating OK.     ---___---___---___---___---___---      <<<  MEDICATIONS:  >>>    MEDICATIONS  (STANDING):  ALBUTerol/ipratropium for Nebulization 3 milliLiter(s) Nebulizer every 6 hours  buDESOnide 160 MICROgram(s)/formoterol 4.5 MICROgram(s) Inhaler 2 Puff(s) Inhalation two times a day  clobetasol 0.05% Ointment 1 Application(s) Topical two times a day  clonazePAM Tablet 1.5 milliGRAM(s) Oral at bedtime  clopidogrel Tablet 75 milliGRAM(s) Oral daily  dextrose 5%. 1000 milliLiter(s) (50 mL/Hr) IV Continuous <Continuous>  dextrose 50% Injectable 12.5 Gram(s) IV Push once  dextrose 50% Injectable 25 Gram(s) IV Push once  dextrose 50% Injectable 25 Gram(s) IV Push once  fentaNYL   Patch  12 MICROgram(s)/Hr 1 Patch Transdermal every 72 hours  ferrous    sulfate Liquid 300 milliGRAM(s) Enteral Tube daily  gabapentin   Solution 300 milliGRAM(s) Oral two times a day  guaiFENesin    Syrup 200 milliGRAM(s) Oral every 2 hours  insulin lispro (HumaLOG) corrective regimen sliding scale   SubCutaneous every 6 hours  medical marijuana oil 1 milliLiter(s) 1 milliLiter(s) SubLingual <User Schedule>  minocycline 100 milliGRAM(s) Oral two times a day  mirtazapine 7.5 milliGRAM(s) Oral at bedtime  pantoprazole   Suspension 40 milliGRAM(s) Oral before breakfast  predniSONE   Tablet 7.5 milliGRAM(s) Oral <User Schedule>  QUEtiapine 25 milliGRAM(s) Oral at bedtime  sertraline 50 milliGRAM(s) Oral daily  tiotropium 18 MICROgram(s) Capsule 1 Capsule(s) Inhalation daily  tiZANidine 4 milliGRAM(s) Oral <User Schedule>      MEDICATIONS  (PRN):  acetaminophen    Suspension .. 680 milliGRAM(s) Oral every 6 hours PRN Temp greater or equal to 38C (100.4F), Mild Pain (1 - 3)  dextrose 40% Gel 15 Gram(s) Oral once PRN Blood Glucose LESS THAN 70 milliGRAM(s)/deciliter  glucagon  Injectable 1 milliGRAM(s) IntraMuscular once PRN Glucose LESS THAN 70 milligrams/deciliter  nystatin Powder 1 Application(s) Topical two times a day PRN rash/itchiness       ---___---___---___---___---___---     <<<REVIEW OF SYSTEM: >>>    GEN: no fever, no chills, no pain  RESP: no SOB, no cough, no sputum  CVS: no chest pain, no palpitations, no edema  GI: no abdominal pain, no nausea, no vomiting, no constipation, no diarrhea  : no dysurea, no frequency  NEURO: no headache, no dizziness  PSYCH: no depression, not anxious  Derm : no itching, no rash     ---___---___---___---___---___---          <<<  VITAL SIGNS: >>>    T(F): 97.4 (18 @ 11:47), Max: 98.4 (18 @ 22:09)  HR: 89 (18 @ 11:47) (82 - 117)  BP: 139/86 (18 @ 11:47) (138/84 - 165/70)  RR: 17 (18 @ 11:47) (16 - 23)  SpO2: 97% (18 @ 11:47) (95% - 98%)  Wt(kg): --  CAPILLARY BLOOD GLUCOSE      POCT Blood Glucose.: 157 mg/dL (03 Dec 2018 16:55)    I&O's Summary    02 Dec 2018 07:01  -  03 Dec 2018 07:00  --------------------------------------------------------  IN: 0 mL / OUT: 600 mL / NET: -600 mL    03 Dec 2018 07:01  -  03 Dec 2018 17:16  --------------------------------------------------------  IN: 200 mL / OUT: 1800 mL / NET: -1600 mL         ---___---___---___---___---___---                       PHYSICAL EXAM:    GEN: A&O X 2, NAD , comfortable  HEENT: NCAT, PERRL, MMM, no scleral icterus, hearing intact  NECK: Supple, No JVD  CVS: S1S2 , regular , No M/R/G appreciated  PULM: CTA B/L,  no W/R/R appreciated  ABD.: soft. non tender, non distended,  bowel sounds present  Extrem: intact pulses , no edema noted  Derm: No rash or ecchymosis noted  PSYCH: normal mood, no depression, not anxious     ---___---___---___---___---___---     <<<  LAB AND IMAGING: >>>                          9.7    11.43 )-----------( 286      ( 03 Dec 2018 07:59 )             30.6               12-    144  |  107  |  41<H>  ----------------------------<  103<H>  4.5   |  21<L>  |  0.54    Ca    9.1      03 Dec 2018 06:42  Phos  3.7     12  Mg     2.1     12-03    TPro  6.5  /  Alb  3.9  /  TBili  0.5  /  DBili  x   /  AST  29  /  ALT  45  /  AlkPhos  181<H>  12-02    PT/INR - ( 02 Dec 2018 19:19 )   PT: 12.2 sec;   INR: 1.06 ratio         PTT - ( 02 Dec 2018 19:19 )  PTT:34.1 sec       Urinalysis Basic - ( 02 Dec 2018 14:42 )    Color: Yellow / Appearance: Slightly Turbid / S.018 / pH: x  Gluc: x / Ketone: Negative  / Bili: Negative / Urobili: 1.0   Blood: x / Protein: 30 mg/dL / Nitrite: Positive   Leuk Esterase: Large / RBC: 10 /hpf /  /HPF   Sq Epi: x / Non Sq Epi: 3 / Bacteria: Few                        [All pertinent / recent available Imaging reports and other labs reviewed]     ---___---___---___---___---___---___ ---___---___---___---___---           <<<  A S S E S S M E N T   A N D   P L A N :  >>>          -GI/DVT Prophylaxis.    --------------------------------------------  Case discussed with daughter   Education given on     >>______________________<<      Deniz Bolden .         phone   [phone]

## 2018-12-03 NOTE — PROVIDER CONTACT NOTE (OTHER) - ASSESSMENT
Pt is in bed sleeping, at times, arousable, but AOx1. Denies chest pain, no s/s of pain. 97.6, 95, 138/84, 20, 98% RA.

## 2018-12-03 NOTE — CHART NOTE - NSCHARTNOTEFT_GEN_A_CORE
Notified by RN for BCx (12/2) positive for GPC in aerobic bottle. Patient with history of MRSA infection of R hip prosthesis (7/2018) and has been taking PO minocycline outpatient.   - VS stable. Pt has been afebrile.  - Abx changed to IV vanco.  - ID to be called by attending in AM.  - Repeat BCx in AM.   Discussed with Dr. Bolden who agrees to plan. Will endorse to primary team in AM.      Sasha Blakely PA-C  Department of Medicine  #[id number] Notified by RN for BCx (12/2) positive for GPC in aerobic bottle. Patient with history of MRSA infection of R hip prosthesis (7/2018) and has been taking PO minocycline outpatient.   - VS stable. Pt has been afebrile.  - Abx changed to IV vanco.  - ID to be called by attending in AM.  - Repeat BCx in AM.   Discussed with Dr. Bolden who agrees to plan. Will endorse to primary team in AM.    Sasha Blakely PA-C  Department of Medicine  #[id number]      ADDENDUM:  BCx PCR shows CONS, possibly a contaminant. Repeat BCx to be drawn in AM. Follow-up with ID recs.  Notified by RN that  noted pt to be scratching during vanco infusion. Pt has received vancomycin on previous admissions. Pt in NAD, no rashes/hives on exam, afebrile, no respiratory distress. Benadryl 50mg given, tolerated remainder of vanco infusion without issues. Continue to monitor. Notified by RN for BCx (12/2) positive for GPC in aerobic bottle. Patient with history of MRSA infection of R hip prosthesis (7/2018) and has been taking PO minocycline outpatient.   - VS stable. Pt has been afebrile.  - Abx changed to IV vanco.  - ID to be called by attending in AM.  - Repeat BCx in AM.   Discussed with Dr. Bolden who agrees to plan. Will endorse to primary team in AM.    Sasha Blakely PA-C  Department of Medicine  #[id number]      ADDENDUM:  BCx PCR shows CONS, possibly a contaminant. Repeat BCx to be drawn in AM. Follow-up with ID recs.  Notified by RN that  noted pt to be scratching during vanco infusion. Pt has received vancomycin on previous admissions. Pt in NAD, no rashes/hives on exam, afebrile, no respiratory distress. Benadryl 50mg given, tolerated remainder of vanco infusion without issues. Continue to monitor.      ADDENDUM2:  Notified by RN at 0600 of new rash. Generalized, pink rash noted along back, hip and thighs with a few small hives superimposed on chronic-appearing skin lesions. Benadryl 50mg elixir x 1 dose. Consider allergy consult.

## 2018-12-04 DIAGNOSIS — F33.1 MAJOR DEPRESSIVE DISORDER, RECURRENT, MODERATE: ICD-10-CM

## 2018-12-04 DIAGNOSIS — I63.9 CEREBRAL INFARCTION, UNSPECIFIED: ICD-10-CM

## 2018-12-04 LAB
-  AMIKACIN: SIGNIFICANT CHANGE UP
-  AZTREONAM: SIGNIFICANT CHANGE UP
-  CEFEPIME: SIGNIFICANT CHANGE UP
-  CEFTAZIDIME: SIGNIFICANT CHANGE UP
-  CIPROFLOXACIN: SIGNIFICANT CHANGE UP
-  GENTAMICIN: SIGNIFICANT CHANGE UP
-  IMIPENEM: SIGNIFICANT CHANGE UP
-  LEVOFLOXACIN: SIGNIFICANT CHANGE UP
-  MEROPENEM: SIGNIFICANT CHANGE UP
-  PIPERACILLIN/TAZOBACTAM: SIGNIFICANT CHANGE UP
-  TOBRAMYCIN: SIGNIFICANT CHANGE UP
ANION GAP SERPL CALC-SCNC: 14 MMOL/L — SIGNIFICANT CHANGE UP (ref 5–17)
BUN SERPL-MCNC: 39 MG/DL — HIGH (ref 7–23)
CALCIUM SERPL-MCNC: 9.1 MG/DL — SIGNIFICANT CHANGE UP (ref 8.4–10.5)
CHLORIDE SERPL-SCNC: 106 MMOL/L — SIGNIFICANT CHANGE UP (ref 96–108)
CO2 SERPL-SCNC: 24 MMOL/L — SIGNIFICANT CHANGE UP (ref 22–31)
CREAT SERPL-MCNC: 0.61 MG/DL — SIGNIFICANT CHANGE UP (ref 0.5–1.3)
CULTURE RESULTS: SIGNIFICANT CHANGE UP
CULTURE RESULTS: SIGNIFICANT CHANGE UP
GLUCOSE BLDC GLUCOMTR-MCNC: 139 MG/DL — HIGH (ref 70–99)
GLUCOSE BLDC GLUCOMTR-MCNC: 148 MG/DL — HIGH (ref 70–99)
GLUCOSE BLDC GLUCOMTR-MCNC: 149 MG/DL — HIGH (ref 70–99)
GLUCOSE BLDC GLUCOMTR-MCNC: 154 MG/DL — HIGH (ref 70–99)
GLUCOSE SERPL-MCNC: 128 MG/DL — HIGH (ref 70–99)
HCT VFR BLD CALC: 32 % — LOW (ref 34.5–45)
HGB BLD-MCNC: 10.1 G/DL — LOW (ref 11.5–15.5)
MAGNESIUM SERPL-MCNC: 1.9 MG/DL — SIGNIFICANT CHANGE UP (ref 1.6–2.6)
MCHC RBC-ENTMCNC: 30.7 PG — SIGNIFICANT CHANGE UP (ref 27–34)
MCHC RBC-ENTMCNC: 31.6 GM/DL — LOW (ref 32–36)
MCV RBC AUTO: 97.3 FL — SIGNIFICANT CHANGE UP (ref 80–100)
METHOD TYPE: SIGNIFICANT CHANGE UP
ORGANISM # SPEC MICROSCOPIC CNT: SIGNIFICANT CHANGE UP
PHOSPHATE SERPL-MCNC: 4.1 MG/DL — SIGNIFICANT CHANGE UP (ref 2.5–4.5)
PLATELET # BLD AUTO: 298 K/UL — SIGNIFICANT CHANGE UP (ref 150–400)
POTASSIUM SERPL-MCNC: 3.8 MMOL/L — SIGNIFICANT CHANGE UP (ref 3.5–5.3)
POTASSIUM SERPL-SCNC: 3.8 MMOL/L — SIGNIFICANT CHANGE UP (ref 3.5–5.3)
RBC # BLD: 3.29 M/UL — LOW (ref 3.8–5.2)
RBC # FLD: 17.2 % — HIGH (ref 10.3–14.5)
SODIUM SERPL-SCNC: 144 MMOL/L — SIGNIFICANT CHANGE UP (ref 135–145)
SPECIMEN SOURCE: SIGNIFICANT CHANGE UP
SPECIMEN SOURCE: SIGNIFICANT CHANGE UP
WBC # BLD: 10.7 K/UL — HIGH (ref 3.8–10.5)
WBC # FLD AUTO: 10.7 K/UL — HIGH (ref 3.8–10.5)

## 2018-12-04 PROCEDURE — 93010 ELECTROCARDIOGRAM REPORT: CPT

## 2018-12-04 PROCEDURE — 99233 SBSQ HOSP IP/OBS HIGH 50: CPT

## 2018-12-04 RX ORDER — MINOCYCLINE HYDROCHLORIDE 45 MG/1
100 TABLET, EXTENDED RELEASE ORAL
Qty: 0 | Refills: 0 | Status: DISCONTINUED | OUTPATIENT
Start: 2018-12-04 | End: 2018-12-04

## 2018-12-04 RX ORDER — DIPHENHYDRAMINE HCL 50 MG
50 CAPSULE ORAL ONCE
Qty: 0 | Refills: 0 | Status: COMPLETED | OUTPATIENT
Start: 2018-12-04 | End: 2018-12-04

## 2018-12-04 RX ADMIN — FENTANYL CITRATE 1 PATCH: 50 INJECTION INTRAVENOUS at 00:07

## 2018-12-04 RX ADMIN — Medication 7.5 MILLIGRAM(S): at 09:21

## 2018-12-04 RX ADMIN — BUDESONIDE AND FORMOTEROL FUMARATE DIHYDRATE 2 PUFF(S): 160; 4.5 AEROSOL RESPIRATORY (INHALATION) at 05:18

## 2018-12-04 RX ADMIN — Medication 3 MILLILITER(S): at 18:50

## 2018-12-04 RX ADMIN — Medication 3 MILLILITER(S): at 13:30

## 2018-12-04 RX ADMIN — QUETIAPINE FUMARATE 25 MILLIGRAM(S): 200 TABLET, FILM COATED ORAL at 21:34

## 2018-12-04 RX ADMIN — GABAPENTIN 300 MILLIGRAM(S): 400 CAPSULE ORAL at 18:50

## 2018-12-04 RX ADMIN — CLOPIDOGREL BISULFATE 75 MILLIGRAM(S): 75 TABLET, FILM COATED ORAL at 13:31

## 2018-12-04 RX ADMIN — Medication 3 MILLILITER(S): at 05:18

## 2018-12-04 RX ADMIN — Medication 3 MILLILITER(S): at 03:01

## 2018-12-04 RX ADMIN — Medication 1.5 MILLIGRAM(S): at 21:34

## 2018-12-04 RX ADMIN — Medication 200 MILLIGRAM(S): at 03:01

## 2018-12-04 RX ADMIN — Medication 200 MILLIGRAM(S): at 00:30

## 2018-12-04 RX ADMIN — SERTRALINE 50 MILLIGRAM(S): 25 TABLET, FILM COATED ORAL at 13:30

## 2018-12-04 RX ADMIN — TIZANIDINE 4 MILLIGRAM(S): 4 TABLET ORAL at 21:34

## 2018-12-04 RX ADMIN — GABAPENTIN 300 MILLIGRAM(S): 400 CAPSULE ORAL at 05:25

## 2018-12-04 RX ADMIN — FENTANYL CITRATE 1 PATCH: 50 INJECTION INTRAVENOUS at 18:56

## 2018-12-04 RX ADMIN — Medication 200 MILLIGRAM(S): at 05:18

## 2018-12-04 RX ADMIN — Medication 1 APPLICATION(S): at 18:27

## 2018-12-04 RX ADMIN — TIZANIDINE 4 MILLIGRAM(S): 4 TABLET ORAL at 08:34

## 2018-12-04 RX ADMIN — MIRTAZAPINE 7.5 MILLIGRAM(S): 45 TABLET, ORALLY DISINTEGRATING ORAL at 21:34

## 2018-12-04 RX ADMIN — Medication 1 APPLICATION(S): at 05:19

## 2018-12-04 RX ADMIN — FENTANYL CITRATE 1 PATCH: 50 INJECTION INTRAVENOUS at 08:03

## 2018-12-04 RX ADMIN — PANTOPRAZOLE SODIUM 40 MILLIGRAM(S): 20 TABLET, DELAYED RELEASE ORAL at 06:45

## 2018-12-04 RX ADMIN — BUDESONIDE AND FORMOTEROL FUMARATE DIHYDRATE 2 PUFF(S): 160; 4.5 AEROSOL RESPIRATORY (INHALATION) at 18:49

## 2018-12-04 RX ADMIN — Medication 300 MILLIGRAM(S): at 13:31

## 2018-12-04 RX ADMIN — Medication 50 MILLIGRAM(S): at 06:45

## 2018-12-04 NOTE — PROVIDER CONTACT NOTE (OTHER) - SITUATION
pts was started on Vancomycin 1g IVPB roughly 30 minutes ago,  reports that pt is have itching all over body possible an allergic reaction to vancomycin

## 2018-12-04 NOTE — CHART NOTE - NSCHARTNOTEFT_GEN_A_CORE
Notified by RN for PAT x3sec on telemetry, no prior events this admission. Patient seen and examined at bedside, in NAD. Patient sleeping at time of occurrence. Pt asymptomatic and without complaints. Denies CP, palpitations, SOB, dizziness, syncope.     Vital Signs Last 24 Hrs  T(C): 36.9 (04 Dec 2018 04:38), Max: 36.9 (04 Dec 2018 04:38)  T(F): 98.4 (04 Dec 2018 04:38), Max: 98.4 (04 Dec 2018 04:38)  HR: 97 (04 Dec 2018 04:38) (71 - 109)  BP: 128/72 (04 Dec 2018 04:38) (128/72 - 158/98)  BP(mean): --  RR: 19 (04 Dec 2018 04:38) (16 - 20)  SpO2: 95% (04 Dec 2018 04:38) (95% - 97%)    Physical Exam:   General: A&Ox2, frail appearing, NAD  Card: S1/S2, systolic murmur  Pulm: poor inspiratory effort, CTA b/l  Abd: soft, nontender, nondistended  Ext: no LE edema      A/P:  a 68y Female patient PMH prior stress CM with sepsis with EF recovery (7-8/2018), advanced dementia (nonverbal, dysphagia with PEG tube, bedbound- functional quadriplegia, chronic gavin catheter in place), sacral pressure ulcer stage 3, heel pressure ulcers, asthma on prednisone, HLD, CVA, UC, 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 Now presenting with PAT on telemetry.    # PAT, asymptomatic.  - VS hemodynamically stable.  - Check EKG.  - Check BMP/Mg/Phos this AM. Supplement as needed.  - Card is following, appreciate recs.  - Continue to monitor on telemetry.   Will endorse to primary team in AM.      Sasha Blakely PA-C  Department of Medicine  #[id number] Notified by RN for PAT x3sec on telemetry, no prior events this admission. Patient seen and examined at bedside, in NAD. Patient sleeping at time of occurrence. Pt asymptomatic and without complaints. Denies CP, palpitations, SOB, dizziness, syncope.     Vital Signs Last 24 Hrs  T(C): 36.9 (04 Dec 2018 04:38), Max: 36.9 (04 Dec 2018 04:38)  T(F): 98.4 (04 Dec 2018 04:38), Max: 98.4 (04 Dec 2018 04:38)  HR: 97 (04 Dec 2018 04:38) (71 - 109)  BP: 128/72 (04 Dec 2018 04:38) (128/72 - 158/98)  BP(mean): --  RR: 19 (04 Dec 2018 04:38) (16 - 20)  SpO2: 95% (04 Dec 2018 04:38) (95% - 97%)    Physical Exam:   General: A&Ox2, frail appearing, NAD  Card: S1/S2, systolic murmur  Pulm: poor inspiratory effort, CTA b/l  Abd: soft, nontender, nondistended  Ext: no LE edema      A/P:  a 68y Female patient PMH prior stress CM with sepsis with EF recovery (7-8/2018), advanced dementia (nonverbal, dysphagia with PEG tube, bedbound- functional quadriplegia, chronic gavin catheter in place), sacral pressure ulcer stage 3, heel pressure ulcers, asthma on prednisone, HLD, CVA, UC, 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 Now presenting with PAT on telemetry.    # PAT, asymptomatic.  - VS hemodynamically stable.  - Stat EKG shows NSR, HR 95. No acute changes from prior.  - Check BMP/Mg/Phos this AM. Supplement as needed.  - Card is following, appreciate recs.  - Continue to monitor on telemetry.   Will endorse to primary team in AM.      Sasha Blakely PA-C  Department of Medicine  #[id number]

## 2018-12-04 NOTE — DIETITIAN INITIAL EVALUATION ADULT. - ORAL INTAKE PTA
Pt NPO with enteral feeds. Per spouse, pt was discharged on VitalAF with bolus feeds of 240ml q4hrs (4 feeds total). Reports pt unable to tolerate bolus volume, noted with residuals varying between 0-100ml, decreased bolus to 200ml q4 hrs which pt tolerated better. Home regimen provided 800ml total volume, 960Kcal and 60gm protein. Reports pt also receiving several water flushes throughout the day with medications. Confirms NKFA. Supplements: ferrous sulfate, magnesium oxide, vitamin D, Metformin.

## 2018-12-04 NOTE — DIETITIAN INITIAL EVALUATION ADULT. - 25 CAL
Const: Awake, alert and oriented. In no acute distress. Well appearing.  HEENT: NC/AT. Moist mucous membranes.  Eyes: No scleral icterus. EOMI.  Neck:. Soft and supple. Full ROM without pain.  Cardiac: +S1/S2. No murmurs.   Resp: Speaking in full sentences. No evidence of respiratory distress. No wheezes, rales or rhonchi.  Abd: Soft, RUQ tenderness on palpation, non-distended. Normal bowel sounds in all 4 quadrants.   Back: Spine midline and non-tender. No CVAT.  Skin: No rashes, abrasions or lacerations.  Lymph: No cervical lymphadenopathy.  Neuro: Awake, alert & oriented x 3. Moves all extremities symmetrically. [id number]

## 2018-12-04 NOTE — DIETITIAN INITIAL EVALUATION ADULT. - OTHER INFO
Patient seen for nutrition consult for enteral nutrition PTA and pressure injury >2. Per previous RD notes, pt noted with weights of 111lbs (7/2018), 105.3lbs (11/8). Current dosing weight noted as ~100lbs with additional bed weights of 124.6lbs (12/3) and 122lbs (12/4)-- question accuracy of weight changes, likely due to bed scale error.  Pt currently receiving bolus feeds of Vital AF 200ml q4hrs ( 5 feedings per day) via PEG. Per RN, pt tolerating bolus regimen with no GI distress. Last BM 12/3.

## 2018-12-04 NOTE — PROGRESS NOTE ADULT - SUBJECTIVE AND OBJECTIVE BOX
---___---___---___---___---___---___ ---___---___---___---___---___---___---___---___---___---                    <<<  M E D I C A L   A T T E N D I N G    F O L L O W    U P   N O T E  >>>    - Patient seen and examined by me approximately thirty minutes ago.  - In summary, patient is a 68y year old Female who originally presented with edematous legs   - Patient today overall doing ok, comfortable, eating OK.     ---___---___---___---___---___---      <<<  MEDICATIONS:  >>>    MEDICATIONS  (STANDING):  ALBUTerol/ipratropium for Nebulization 3 milliLiter(s) Nebulizer every 6 hours  buDESOnide 160 MICROgram(s)/formoterol 4.5 MICROgram(s) Inhaler 2 Puff(s) Inhalation two times a day  clobetasol 0.05% Ointment 1 Application(s) Topical two times a day  clonazePAM Tablet 1.5 milliGRAM(s) Oral at bedtime  clopidogrel Tablet 75 milliGRAM(s) Oral daily  dextrose 5%. 1000 milliLiter(s) (50 mL/Hr) IV Continuous <Continuous>  dextrose 50% Injectable 12.5 Gram(s) IV Push once  dextrose 50% Injectable 25 Gram(s) IV Push once  dextrose 50% Injectable 25 Gram(s) IV Push once  Doxycycline calcium suspension 10mg/ml 10 milliLiter(s) 10 milliLiter(s) Oral two times a day  fentaNYL   Patch  12 MICROgram(s)/Hr 1 Patch Transdermal every 72 hours  ferrous    sulfate Liquid 300 milliGRAM(s) Enteral Tube daily  gabapentin   Solution 300 milliGRAM(s) Oral two times a day  insulin lispro (HumaLOG) corrective regimen sliding scale   SubCutaneous every 6 hours  medical marijuana oil 1 milliLiter(s) 1 milliLiter(s) SubLingual <User Schedule>  mirtazapine 7.5 milliGRAM(s) Oral at bedtime  pantoprazole   Suspension 40 milliGRAM(s) Oral before breakfast  predniSONE   Tablet 7.5 milliGRAM(s) Oral <User Schedule>  QUEtiapine 25 milliGRAM(s) Oral at bedtime  sertraline 50 milliGRAM(s) Oral daily  tiotropium 18 MICROgram(s) Capsule 1 Capsule(s) Inhalation daily  tiZANidine 4 milliGRAM(s) Oral <User Schedule>      MEDICATIONS  (PRN):  acetaminophen    Suspension .. 680 milliGRAM(s) Oral every 6 hours PRN Temp greater or equal to 38C (100.4F), Mild Pain (1 - 3)  dextrose 40% Gel 15 Gram(s) Oral once PRN Blood Glucose LESS THAN 70 milliGRAM(s)/deciliter  glucagon  Injectable 1 milliGRAM(s) IntraMuscular once PRN Glucose LESS THAN 70 milligrams/deciliter  nystatin Powder 1 Application(s) Topical two times a day PRN rash/itchiness       ---___---___---___---___---___---     <<<REVIEW OF SYSTEM: >>>    GEN: no fever, no chills, no pain  RESP: no SOB, no cough, no sputum  CVS: no chest pain, no palpitations, no edema  GI: no abdominal pain, no nausea, no vomiting, no constipation, no diarrhea  : no dysurea, no frequency  NEURO: no headache, no dizziness  PSYCH: no depression, not anxious  Derm : no itching, no rash     ---___---___---___---___---___---          <<<  VITAL SIGNS: >>>    T(F): 97.8 (12-04-18 @ 13:47), Max: 98.4 (12-04-18 @ 04:38)  HR: 98 (12-04-18 @ 13:47) (97 - 109)  BP: 132/74 (12-04-18 @ 13:47) (128/72 - 158/98)  RR: 18 (12-04-18 @ 13:47) (18 - 19)  SpO2: 96% (12-04-18 @ 13:47) (95% - 97%)  Wt(kg): --  CAPILLARY BLOOD GLUCOSE      POCT Blood Glucose.: 149 mg/dL (04 Dec 2018 18:56)    I&O's Summary    03 Dec 2018 07:01  -  04 Dec 2018 07:00  --------------------------------------------------------  IN: 600 mL / OUT: 2100 mL / NET: -1500 mL    04 Dec 2018 07:01  -  04 Dec 2018 20:21  --------------------------------------------------------  IN: 0 mL / OUT: 300 mL / NET: -300 mL         ---___---___---___---___---___---                       PHYSICAL EXAM:    GEN: A&O X 2 , NAD , comfortable  HEENT: NCAT, PERRL, MMM, no scleral icterus, hearing intact  NECK: Supple, No JVD  CVS: S1S2 , regular , No M/R/G appreciated  PULM: CTA B/L,  no W/R/R appreciated  ABD.: soft. non tender, non distended,  bowel sounds present peg noted   Extrem: intact pulses , no edema noted  Derm: No rash or ecchymosis noted  PSYCH: normal mood, no depression, not anxious     ---___---___---___---___---___---     <<<  LAB AND IMAGING: >>>                          10.1   10.70 )-----------( 298      ( 04 Dec 2018 08:01 )             32.0               12-04    144  |  106  |  39<H>  ----------------------------<  128<H>  3.8   |  24  |  0.61    Ca    9.1      04 Dec 2018 07:09  Phos  4.1     12-04  Mg     1.9     12-04                                 [All pertinent / recent available Imaging reports and other labs reviewed]     ---___---___---___---___---___---___ ---___---___---___---___---           <<<  A S S E S S M E N T   A N D   P L A N :  >>>          -GI/DVT Prophylaxis.    --------------------------------------------  Case discussed with   Education given on     >>______________________<<      Deniz Bolden .         phone   [phone]

## 2018-12-04 NOTE — DIETITIAN INITIAL EVALUATION ADULT. - ENERGY NEEDS
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

## 2018-12-04 NOTE — PROGRESS NOTE ADULT - SUBJECTIVE AND OBJECTIVE BOX
HPI:  Overnight events appreciated.   Patient had itching reaction while receiving vancomycin. Of note, patient had received Lasix earlier in the day despite a documented sulfa- allergy.    Review Of Systems:      Unable to obtain reliable review of systems in the setting of dementia      Medications:  acetaminophen    Suspension .. 680 milliGRAM(s) Oral every 6 hours PRN  ALBUTerol/ipratropium for Nebulization 3 milliLiter(s) Nebulizer every 6 hours  buDESOnide 160 MICROgram(s)/formoterol 4.5 MICROgram(s) Inhaler 2 Puff(s) Inhalation two times a day  clobetasol 0.05% Ointment 1 Application(s) Topical two times a day  clonazePAM Tablet 1.5 milliGRAM(s) Oral at bedtime  clopidogrel Tablet 75 milliGRAM(s) Oral daily  dextrose 40% Gel 15 Gram(s) Oral once PRN  dextrose 5%. 1000 milliLiter(s) IV Continuous <Continuous>  dextrose 50% Injectable 12.5 Gram(s) IV Push once  dextrose 50% Injectable 25 Gram(s) IV Push once  dextrose 50% Injectable 25 Gram(s) IV Push once  fentaNYL   Patch  12 MICROgram(s)/Hr 1 Patch Transdermal every 72 hours  ferrous    sulfate Liquid 300 milliGRAM(s) Enteral Tube daily  gabapentin   Solution 300 milliGRAM(s) Oral two times a day  glucagon  Injectable 1 milliGRAM(s) IntraMuscular once PRN  insulin lispro (HumaLOG) corrective regimen sliding scale   SubCutaneous every 6 hours  medical marijuana oil 1 milliLiter(s) 1 milliLiter(s) SubLingual <User Schedule>  mirtazapine 7.5 milliGRAM(s) Oral at bedtime  nystatin Powder 1 Application(s) Topical two times a day PRN  pantoprazole   Suspension 40 milliGRAM(s) Oral before breakfast  predniSONE   Tablet 7.5 milliGRAM(s) Oral <User Schedule>  QUEtiapine 25 milliGRAM(s) Oral at bedtime  sertraline 50 milliGRAM(s) Oral daily  tiotropium 18 MICROgram(s) Capsule 1 Capsule(s) Inhalation daily  tiZANidine 4 milliGRAM(s) Oral <User Schedule>    PAST MEDICAL & SURGICAL HISTORY:  CVA (cerebral vascular accident)  Fatty pancreas  PNA (pneumonia)  Pulmonary HTN  IGT (impaired glucose tolerance)  Ulcerative colitis  Acid reflux  Anxiety  Depression  Mouth sores  HLD (hyperlipidemia)  Asthma  Humeral head fracture  H/O: hysterectomy  H/O cataract extraction, left  History of knee replacement    Vitals:  T(C): 36.6 (18 @ 13:47), Max: 36.9 (18 @ 04:38)  HR: 98 (18 @ 13:47) (71 - 109)  BP: 132/74 (18 @ 13:47) (128/72 - 158/98)  BP(mean): --  RR: 18 (18 @ 13:47) (18 - 20)  SpO2: 96% (18 @ 13:47) (95% - 97%)  Wt(kg): --  Daily     Daily Weight in k.2 (04 Dec 2018 11:53)  I&O's Summary    03 Dec 2018 07:01  -  04 Dec 2018 07:00  --------------------------------------------------------  IN: 600 mL / OUT: 2100 mL / NET: -1500 mL    04 Dec 2018 07:01  -  04 Dec 2018 16:15  --------------------------------------------------------  IN: 0 mL / OUT: 300 mL / NET: -300 mL        Physical Exam:  Appearance: appears older than stated age; bedbound, demented; no acute distress  Eyes: PERRL, EOMI, pink conjunctiva  HENT: Normal oral mucosa  Cardiovascular: RRR, S1, S2; trace LE edema bilaterally; +JVD  Respiratory: fine bibasilar rales  Gastrointestinal: soft, non-tender, non-distended with normal bowel sounds  Musculoskeletal: Bedbound, contracted  Neurologic: cranial nerves grossly intact  Lymphatic: No lymphadenopathy  Psychiatry: awake and alert  Skin: No rashes, ecchymoses, or cyanosis                          10.1   10.70 )-----------( 298      ( 04 Dec 2018 08:01 )             32.0     12-04    144  |  106  |  39<H>  ----------------------------<  128<H>  3.8   |  24  |  0.61    Ca    9.1      04 Dec 2018 07:09  Phos  4.1     12-  Mg     1.9     12      PT/INR - ( 02 Dec 2018 19:19 )   PT: 12.2 sec;   INR: 1.06 ratio         PTT - ( 02 Dec 2018 19:19 )  PTT:34.1 sec      Serum Pro-Brain Natriuretic Peptide: 05587 pg/mL ( @ 14:50)      Echo: < from: Transthoracic Echocardiogram (18 @ 11:23) >  ------------------------------------------------------------------------  Conclusions:  1. Mitral annular calcification, otherwise normal mitral  valve. Moderate-severe mitral regurgitation (vena contracta  0.8 cm)  2. Calcified trileafletaortic valve with normal opening.  Peak transaortic valve gradient equals 3 mm Hg, mean  transaortic valve gradient equals 1 mm Hg, aortic valve  velocity time integral equals 13 cm. Moderate aortic  regurgitation  3. Moderately dilated left atrium.LA volume index = 45  cc/m2.  4. Severe global left ventricular systolic dysfunction.  5. Increased E/e'  is consistent with elevated left  ventricular filling pressure.  6. Moderate right atrial enlargement. Inferior vena cava is  dilated (>=2.5cm) with normal respiratory variability  consistent with right atrial pressure 16-20mm Hg.  7. Right ventricular enlargement with decreased right  ventricular systolic function.  8. Normal tricuspid valve. Severe tricuspid regurgitation.  9. Estimated pulmonary artery systolic pressure equals 82  mm Hg, assuming right atrial pressure equals 8 mm Hg,  consistent with severe pulmonary pressures.  10. Color Doppler demonstrates evidence of left to right  shunt  ------------------------------------------------------------------------  Confirmed on  12/3/2018 - 16:37:07 by Margaret Khan M.D.  ------------------------------------------------------------------------    < end of copied text >    Imaging: < from: Xray Chest 1 View- PORTABLE-Urgent (18 @ 17:19) >  IMPRESSION:   Limited study due to patient positioning.  Trace right pleural effusion and likely left pleural effusion. Question   mild pulmonary edema.    HUAN JACOBS M.D., RADIOLOGY RESIDENT  This document has been electronically signed.  TONYA AARON M.D., ATTENDING RADIOLOGIST  This document has been electronically signed. Dec  2 2018  9:17PM      < end of copied text >    Interpretation of Telemetry: sinus rhythm

## 2018-12-04 NOTE — DIETITIAN INITIAL EVALUATION ADULT. - NS AS NUTRI INTERV ENTERAL NUTRITION
Recommend continue bolus feeds of Vital AF @ 200ml q 4hs (5 feeds per day,. Current regimen provides 1000ml total volume, 1200kcal (26Kcal/Kg) and 75g (1.65g/Kg) protein and 811ml free water based on current dosing wt of 45.4lbs. Defer additional free water to team. Current regimen providing 811ml free water. Recommend continue bolus feeds of Vital AF @ 200ml q 4hs (5 feeds per day). Current regimen provides 1000ml total volume, 1200kcal (26Kcal/Kg) and 75g (1.65g/Kg) protein and 811ml free water based on current dosing wt of 45.4lbs. Defer additional free water to team. Current regimen providing 811ml free water.

## 2018-12-04 NOTE — CONSULT NOTE ADULT - SUBJECTIVE AND OBJECTIVE BOX
HPI:   Patient is a 68y female with dementia, dysphagia, peg , prolonged hospital stay this past summer for mrsa bacteremia and infected prosthetic hip s/p leisa and spacer. She has a gavin. She is maintained at home with home MD visits. She returns to the hospital because of pedal edema and high bnp suggesting chf. She cannot give any information. Daughter reports she has had no fever, vomiting, diarrhea at home. She has been making positive progress at home.     REVIEW OF SYSTEMS:  All other review of systems negative (Comprehensive ROS)    PAST MEDICAL & SURGICAL HISTORY:  CVA (cerebral vascular accident)  Fatty pancreas  PNA (pneumonia)  Pulmonary HTN  IGT (impaired glucose tolerance)  Ulcerative colitis  Acid reflux  Anxiety  Depression  Mouth sores  HLD (hyperlipidemia)  Asthma  Humeral head fracture  H/O: hysterectomy  H/O cataract extraction, left  History of knee replacement      Allergies    ASA; dye contrast (Anaphylaxis)  aspirin (Short breath)  divalproex sodium (Other (Unknown))  Haldol (Other (Unknown))  penicillin (Short breath; Rash)  sulfa drugs (Short breath; Rash)  Xanax (Other (Unknown))    Intolerances        Antimicrobials Day #  :2  vancomycin  IVPB 1000 milliGRAM(s) IV Intermittent every 12 hours    Other Medications:  acetaminophen    Suspension .. 680 milliGRAM(s) Oral every 6 hours PRN  ALBUTerol/ipratropium for Nebulization 3 milliLiter(s) Nebulizer every 6 hours  buDESOnide 160 MICROgram(s)/formoterol 4.5 MICROgram(s) Inhaler 2 Puff(s) Inhalation two times a day  clobetasol 0.05% Ointment 1 Application(s) Topical two times a day  clonazePAM Tablet 1.5 milliGRAM(s) Oral at bedtime  clopidogrel Tablet 75 milliGRAM(s) Oral daily  dextrose 40% Gel 15 Gram(s) Oral once PRN  dextrose 5%. 1000 milliLiter(s) IV Continuous <Continuous>  dextrose 50% Injectable 12.5 Gram(s) IV Push once  dextrose 50% Injectable 25 Gram(s) IV Push once  dextrose 50% Injectable 25 Gram(s) IV Push once  fentaNYL   Patch  12 MICROgram(s)/Hr 1 Patch Transdermal every 72 hours  ferrous    sulfate Liquid 300 milliGRAM(s) Enteral Tube daily  gabapentin   Solution 300 milliGRAM(s) Oral two times a day  glucagon  Injectable 1 milliGRAM(s) IntraMuscular once PRN  insulin lispro (HumaLOG) corrective regimen sliding scale   SubCutaneous every 6 hours  medical marijuana oil 1 milliLiter(s) 1 milliLiter(s) SubLingual <User Schedule>  mirtazapine 7.5 milliGRAM(s) Oral at bedtime  nystatin Powder 1 Application(s) Topical two times a day PRN  pantoprazole   Suspension 40 milliGRAM(s) Oral before breakfast  predniSONE   Tablet 7.5 milliGRAM(s) Oral <User Schedule>  QUEtiapine 25 milliGRAM(s) Oral at bedtime  sertraline 50 milliGRAM(s) Oral daily  tiotropium 18 MICROgram(s) Capsule 1 Capsule(s) Inhalation daily  tiZANidine 4 milliGRAM(s) Oral <User Schedule>      FAMILY HISTORY:  Family history of dementia (Grandparent)  Family history of colon cancer (Grandparent)      SOCIAL HISTORY:  Smoking: [ ]Yes x[ ]No  ETOH: [ ]Yes [ x]No  Drug Use: [ ]Yes [ x]No   [ x] Single[ ]    T(F): 98.4 (18 @ 04:38), Max: 98.4 (18 @ 04:38)  HR: 97 (18 @ 04:38)  BP: 128/72 (18 @ 04:38)  RR: 19 (18 @ 04:38)  SpO2: 95% (18 @ 04:38)  Wt(kg): --    PHYSICAL EXAM:  General: very lethargic, leans to left no acute distress  Eyes:  anicteric, no conjunctival injection, no discharge  Oropharynx: no lesions or injection 	  Neck: supple, without adenopathy  Lungs: grossly clear. to auscultation  Heart: regular rate and rhythm; no murmur, rubs or gallops  Abdomen: soft, nondistended, nontender, without mass or organomegaly  Skin: no lesions  Extremities: right hip wound healed. some edema of legs where knees are bent  Neurologic: nonverbal, somnolent, stiff all over, no commands followed  back superficial sacral ulcer    LAB RESULTS:                        10.1   10.70 )-----------( 298      ( 04 Dec 2018 08:01 )             32.0     12-    144  |  106  |  39<H>  ----------------------------<  128<H>  3.8   |  24  |  0.61    Ca    9.1      04 Dec 2018 07:09  Phos  4.1       Mg     1.9         TPro  6.5  /  Alb  3.9  /  TBili  0.5  /  DBili  x   /  AST  29  /  ALT  45  /  AlkPhos  181<H>      LIVER FUNCTIONS - ( 02 Dec 2018 14:50 )  Alb: 3.9 g/dL / Pro: 6.5 g/dL / ALK PHOS: 181 U/L / ALT: 45 U/L / AST: 29 U/L / GGT: x           Urinalysis Basic - ( 02 Dec 2018 14:42 )    Color: Yellow / Appearance: Slightly Turbid / S.018 / pH: x  Gluc: x / Ketone: Negative  / Bili: Negative / Urobili: 1.0   Blood: x / Protein: 30 mg/dL / Nitrite: Positive   Leuk Esterase: Large / RBC: 10 /hpf /  /HPF   Sq Epi: x / Non Sq Epi: 3 / Bacteria: Few        MICROBIOLOGY:  RECENT CULTURES:   @ 17:48 .Blood Blood-Peripheral Blood Culture PCR    Growth in aerobic bottle: Gram Positive Cocci in Clusters  "Due to technical problems, Proteus sp. will Not be reported as part of  the BCID panel until further notice"  ***Blood Panel PCR results on this specimen are available  approximately 3 hours after the Gram stain result.***  Gram stain, PCR, and/or culture results may not always  correspond due to difference in methodologies.  ************************************************************  This PCR assay was performed using [employer].  The following targets are tested for: Enterococcus,  vancomycin resistant enterococci, Listeria monocytogenes,  coagulase negative staphylococci, S. aureus,  methicillin resistant S. aureus, Streptococcus agalactiae  (Group B), S. pneumoniae, S.pyogenes (Group A),  Acinetobacter baumannii, Enterobacter cloacae, E. coli,  Klebsiella oxytoca, K. pneumoniae, Proteus sp.,  Serratia marcescens, Haemophilus influenzae,  Neisseria meningitidis, Pseudomonas aeruginosa, Candida  albicans, C. glabrata, C krusei, C parapsilosis,  C. tropicalis and the KPC resistance gene.    Growth in aerobic bottle: Gram Positive Cocci in Clusters     @ 17:44 .Urine Catheterized     >100,000 CFU/ml Pseudomonas aeruginosa            RADIOLOGY REVIEWED:  < from: Xray Chest 1 View- PORTABLE-Urgent (18 @ 17:19) >  IMPRESSION:   Limited study due to patient positioning.  Trace right pleural effusion and likely left pleural effusion. Question   mild pulmonary edema.    < end of copied text >    < from: NM Pulmonary Ventilation/Perfusion Scan (18 @ 09:39) >    IMPRESSION: Abnormal ventilation/perfusion lung scan.    Very low probability of pulmonary embolus.    < from: [facility] Duplex Lower Ext Vein Scan, Bilat (18 @ 21:24) >  IMPRESSION: Lower extremity DVT is NOT identified.      < end of copied text >    < end of copied text >        Impression: Patient with advanced dementia, peg, recent prolonged stay for mrsa septic thr and bacteremia on suppressive minocycline who returns for fluid overload. She has no fever, wbc just mild elevation now normal, positive blood cx  cns is a contaminant, positive urine culture with gavin just colonizer, no pneumonia apparent. No infection is apparent at present.     Recommendations:  Stop vanco and restart suppressive minocycline  Monitor off additional antibiotics for now  Fluid overload evaluation and tx in progress

## 2018-12-05 DIAGNOSIS — F41.9 ANXIETY DISORDER, UNSPECIFIED: ICD-10-CM

## 2018-12-05 LAB
ANION GAP SERPL CALC-SCNC: 15 MMOL/L — SIGNIFICANT CHANGE UP (ref 5–17)
BUN SERPL-MCNC: 38 MG/DL — HIGH (ref 7–23)
CALCIUM SERPL-MCNC: 9 MG/DL — SIGNIFICANT CHANGE UP (ref 8.4–10.5)
CHLORIDE SERPL-SCNC: 107 MMOL/L — SIGNIFICANT CHANGE UP (ref 96–108)
CO2 SERPL-SCNC: 23 MMOL/L — SIGNIFICANT CHANGE UP (ref 22–31)
CREAT SERPL-MCNC: 0.51 MG/DL — SIGNIFICANT CHANGE UP (ref 0.5–1.3)
GLUCOSE BLDC GLUCOMTR-MCNC: 133 MG/DL — HIGH (ref 70–99)
GLUCOSE BLDC GLUCOMTR-MCNC: 134 MG/DL — HIGH (ref 70–99)
GLUCOSE BLDC GLUCOMTR-MCNC: 137 MG/DL — HIGH (ref 70–99)
GLUCOSE BLDC GLUCOMTR-MCNC: 167 MG/DL — HIGH (ref 70–99)
GLUCOSE SERPL-MCNC: 115 MG/DL — HIGH (ref 70–99)
HCT VFR BLD CALC: 32.2 % — LOW (ref 34.5–45)
HGB BLD-MCNC: 10.1 G/DL — LOW (ref 11.5–15.5)
MCHC RBC-ENTMCNC: 30.9 PG — SIGNIFICANT CHANGE UP (ref 27–34)
MCHC RBC-ENTMCNC: 31.4 GM/DL — LOW (ref 32–36)
MCV RBC AUTO: 98.5 FL — SIGNIFICANT CHANGE UP (ref 80–100)
PLATELET # BLD AUTO: 249 K/UL — SIGNIFICANT CHANGE UP (ref 150–400)
POTASSIUM SERPL-MCNC: 3.8 MMOL/L — SIGNIFICANT CHANGE UP (ref 3.5–5.3)
POTASSIUM SERPL-SCNC: 3.8 MMOL/L — SIGNIFICANT CHANGE UP (ref 3.5–5.3)
RBC # BLD: 3.27 M/UL — LOW (ref 3.8–5.2)
RBC # FLD: 17.3 % — HIGH (ref 10.3–14.5)
SODIUM SERPL-SCNC: 145 MMOL/L — SIGNIFICANT CHANGE UP (ref 135–145)
WBC # BLD: 10.42 K/UL — SIGNIFICANT CHANGE UP (ref 3.8–10.5)
WBC # FLD AUTO: 10.42 K/UL — SIGNIFICANT CHANGE UP (ref 3.8–10.5)

## 2018-12-05 PROCEDURE — 99233 SBSQ HOSP IP/OBS HIGH 50: CPT

## 2018-12-05 RX ORDER — PETROLATUM,WHITE
1 JELLY (GRAM) TOPICAL THREE TIMES A DAY
Qty: 0 | Refills: 0 | Status: DISCONTINUED | OUTPATIENT
Start: 2018-12-05 | End: 2019-01-16

## 2018-12-05 RX ORDER — MINOCYCLINE HYDROCHLORIDE 45 MG/1
100 TABLET, EXTENDED RELEASE ORAL
Qty: 0 | Refills: 0 | Status: DISCONTINUED | OUTPATIENT
Start: 2018-12-05 | End: 2019-01-08

## 2018-12-05 RX ORDER — LISINOPRIL 2.5 MG/1
5 TABLET ORAL DAILY
Qty: 0 | Refills: 0 | Status: DISCONTINUED | OUTPATIENT
Start: 2018-12-05 | End: 2018-12-20

## 2018-12-05 RX ADMIN — Medication 1.5 MILLIGRAM(S): at 22:14

## 2018-12-05 RX ADMIN — TIOTROPIUM BROMIDE 1 CAPSULE(S): 18 CAPSULE ORAL; RESPIRATORY (INHALATION) at 12:23

## 2018-12-05 RX ADMIN — FENTANYL CITRATE 1 PATCH: 50 INJECTION INTRAVENOUS at 07:36

## 2018-12-05 RX ADMIN — MIRTAZAPINE 7.5 MILLIGRAM(S): 45 TABLET, ORALLY DISINTEGRATING ORAL at 22:14

## 2018-12-05 RX ADMIN — Medication 3 MILLILITER(S): at 18:51

## 2018-12-05 RX ADMIN — Medication 1 APPLICATION(S): at 06:48

## 2018-12-05 RX ADMIN — TIZANIDINE 4 MILLIGRAM(S): 4 TABLET ORAL at 09:10

## 2018-12-05 RX ADMIN — BUDESONIDE AND FORMOTEROL FUMARATE DIHYDRATE 2 PUFF(S): 160; 4.5 AEROSOL RESPIRATORY (INHALATION) at 18:52

## 2018-12-05 RX ADMIN — Medication 1 APPLICATION(S): at 18:45

## 2018-12-05 RX ADMIN — GABAPENTIN 300 MILLIGRAM(S): 400 CAPSULE ORAL at 06:48

## 2018-12-05 RX ADMIN — FENTANYL CITRATE 1 PATCH: 50 INJECTION INTRAVENOUS at 19:11

## 2018-12-05 RX ADMIN — SERTRALINE 50 MILLIGRAM(S): 25 TABLET, FILM COATED ORAL at 12:23

## 2018-12-05 RX ADMIN — Medication 3 MILLILITER(S): at 06:47

## 2018-12-05 RX ADMIN — Medication 680 MILLIGRAM(S): at 07:36

## 2018-12-05 RX ADMIN — MINOCYCLINE HYDROCHLORIDE 100 MILLIGRAM(S): 45 TABLET, EXTENDED RELEASE ORAL at 18:51

## 2018-12-05 RX ADMIN — QUETIAPINE FUMARATE 25 MILLIGRAM(S): 200 TABLET, FILM COATED ORAL at 22:14

## 2018-12-05 RX ADMIN — Medication 3 MILLILITER(S): at 00:28

## 2018-12-05 RX ADMIN — NYSTATIN CREAM 1 APPLICATION(S): 100000 CREAM TOPICAL at 06:49

## 2018-12-05 RX ADMIN — Medication 3 MILLILITER(S): at 12:23

## 2018-12-05 RX ADMIN — Medication 300 MILLIGRAM(S): at 12:23

## 2018-12-05 RX ADMIN — LISINOPRIL 5 MILLIGRAM(S): 2.5 TABLET ORAL at 12:23

## 2018-12-05 RX ADMIN — GABAPENTIN 300 MILLIGRAM(S): 400 CAPSULE ORAL at 18:51

## 2018-12-05 RX ADMIN — TIZANIDINE 4 MILLIGRAM(S): 4 TABLET ORAL at 22:14

## 2018-12-05 RX ADMIN — BUDESONIDE AND FORMOTEROL FUMARATE DIHYDRATE 2 PUFF(S): 160; 4.5 AEROSOL RESPIRATORY (INHALATION) at 06:47

## 2018-12-05 RX ADMIN — PANTOPRAZOLE SODIUM 40 MILLIGRAM(S): 20 TABLET, DELAYED RELEASE ORAL at 06:48

## 2018-12-05 RX ADMIN — CLOPIDOGREL BISULFATE 75 MILLIGRAM(S): 75 TABLET, FILM COATED ORAL at 12:23

## 2018-12-05 RX ADMIN — Medication 680 MILLIGRAM(S): at 06:48

## 2018-12-05 RX ADMIN — Medication 7.5 MILLIGRAM(S): at 09:10

## 2018-12-05 RX ADMIN — Medication 1: at 12:13

## 2018-12-05 RX ADMIN — Medication 1 APPLICATION(S): at 22:14

## 2018-12-05 NOTE — PROGRESS NOTE ADULT - SUBJECTIVE AND OBJECTIVE BOX
HPI:  Patient continues to diurese well.  No evidence of infection.  Started on ACEi.    Review Of Systems:           Respiratory: No shortness of breath, cough, or wheezing  Cardiovascular: No chest pain or palpitations  10 point review of systems is otherwise negative except as mentioned above        Medications:  acetaminophen    Suspension .. 680 milliGRAM(s) Oral every 6 hours PRN  ALBUTerol/ipratropium for Nebulization 3 milliLiter(s) Nebulizer every 6 hours  AQUAPHOR (petrolatum Ointment) 1 Application(s) Topical three times a day  buDESOnide 160 MICROgram(s)/formoterol 4.5 MICROgram(s) Inhaler 2 Puff(s) Inhalation two times a day  clobetasol 0.05% Ointment 1 Application(s) Topical two times a day  clonazePAM Tablet 1.5 milliGRAM(s) Oral at bedtime  clopidogrel Tablet 75 milliGRAM(s) Oral daily  dextrose 40% Gel 15 Gram(s) Oral once PRN  dextrose 5%. 1000 milliLiter(s) IV Continuous <Continuous>  dextrose 50% Injectable 12.5 Gram(s) IV Push once  dextrose 50% Injectable 25 Gram(s) IV Push once  dextrose 50% Injectable 25 Gram(s) IV Push once  fentaNYL   Patch  12 MICROgram(s)/Hr 1 Patch Transdermal every 72 hours  ferrous    sulfate Liquid 300 milliGRAM(s) Enteral Tube daily  gabapentin   Solution 300 milliGRAM(s) Oral two times a day  glucagon  Injectable 1 milliGRAM(s) IntraMuscular once PRN  insulin lispro (HumaLOG) corrective regimen sliding scale   SubCutaneous every 6 hours  lisinopril 5 milliGRAM(s) Oral daily  medical marijuana oil 1 milliLiter(s) 1 milliLiter(s) SubLingual <User Schedule>  minocycline 100 milliGRAM(s) Oral two times a day  mirtazapine 7.5 milliGRAM(s) Oral at bedtime  nystatin Powder 1 Application(s) Topical two times a day PRN  pantoprazole   Suspension 40 milliGRAM(s) Oral before breakfast  predniSONE   Tablet 7.5 milliGRAM(s) Oral <User Schedule>  QUEtiapine 25 milliGRAM(s) Oral at bedtime  sertraline 50 milliGRAM(s) Oral daily  tiotropium 18 MICROgram(s) Capsule 1 Capsule(s) Inhalation daily  tiZANidine 4 milliGRAM(s) Oral <User Schedule>    PAST MEDICAL & SURGICAL HISTORY:  CVA (cerebral vascular accident)  Fatty pancreas  PNA (pneumonia)  Pulmonary HTN  IGT (impaired glucose tolerance)  Ulcerative colitis  Acid reflux  Anxiety  Depression  Mouth sores  HLD (hyperlipidemia)  Asthma  Humeral head fracture  H/O: hysterectomy  H/O cataract extraction, left  History of knee replacement    Vitals:  T(C): 36.5 (18 @ 11:38), Max: 37.8 (18 @ 22:12)  HR: 89 (18 @ 11:38) (89 - 97)  BP: 147/87 (18 @ 11:38) (120/63 - 158/91)  BP(mean): --  RR: 17 (18 @ 11:38) (17 - 19)  SpO2: 100% (18 @ 11:38) (95% - 100%)  Wt(kg): --  Daily     Daily Weight in k.4 (05 Dec 2018 07:23)  I&O's Summary    04 Dec 2018 07:  -  05 Dec 2018 07:00  --------------------------------------------------------  IN: 0 mL / OUT: 700 mL / NET: -700 mL    05 Dec 2018 07:  -  05 Dec 2018 20:39  --------------------------------------------------------  IN: 0 mL / OUT: 300 mL / NET: -300 mL        Physical Exam:  Appearance: appears older than stated age; bedbound, demented; no acute distress  Eyes: PERRL, EOMI, pink conjunctiva  HENT: Normal oral mucosa  Cardiovascular: RRR, S1, S2; trace LE edema bilaterally; +JVD  Respiratory: fine bibasilar rales  Gastrointestinal: soft, non-tender, non-distended with normal bowel sounds; +PEG  Musculoskeletal: Bedbound, contracted  Neurologic: cranial nerves grossly intact  Lymphatic: No lymphadenopathy  Psychiatry: awake and alert  Skin: No rashes, ecchymoses, or cyanosis                            10.1   10.42 )-----------( 249      ( 05 Dec 2018 09:22 )             32.2     12-    145  |  107  |  38<H>  ----------------------------<  115<H>  3.8   |  23  |  0.51    Ca    9.0      05 Dec 2018 07:21  Phos  4.1     12-  Mg     1.9     12            Serum Pro-Brain Natriuretic Peptide: 33250 pg/mL ( @ 14:50)      Echo: < from: Transthoracic Echocardiogram (18 @ 11:23) >  ------------------------------------------------------------------------  Conclusions:  1. Mitral annular calcification, otherwise normal mitral  valve. Moderate-severe mitral regurgitation (vena contracta  0.8 cm)  2. Calcified trileafletaortic valve with normal opening.  Peak transaortic valve gradient equals 3 mm Hg, mean  transaortic valve gradient equals 1 mm Hg, aortic valve  velocity time integral equals 13 cm. Moderate aortic  regurgitation  3. Moderately dilated left atrium.LA volume index = 45  cc/m2.  4. Severe global left ventricular systolic dysfunction.  5. Increased E/e'  is consistent with elevated left  ventricular filling pressure.  6. Moderate right atrial enlargement. Inferior vena cava is  dilated (>=2.5cm) with normal respiratory variability  consistent with right atrial pressure 16-20mm Hg.  7. Right ventricular enlargement with decreased right  ventricular systolic function.  8. Normal tricuspid valve. Severe tricuspid regurgitation.  9. Estimated pulmonary artery systolic pressure equals 82  mm Hg, assuming right atrial pressure equals 8 mm Hg,  consistent with severe pulmonary pressures.  10. Color Doppler demonstrates evidence of left to right  shunt  ------------------------------------------------------------------------  Confirmed on  12/3/2018 - 16:37:07 by Margaret Khan M.D.  ------------------------------------------------------------------------    < end of copied text >    Imaging: < from: Xray Chest 1 View- PORTABLE-Urgent (18 @ 17:19) >  IMPRESSION:   Limited study due to patient positioning.  Trace right pleural effusion and likely left pleural effusion. Question   mild pulmonary edema.    HAUN JACOBS M.D., RADIOLOGY RESIDENT  This document has been electronically signed.  TONYA AARON M.D., ATTENDING RADIOLOGIST  This document has been electronically signed. Dec  2 2018  9:17PM      < end of copied text >    Interpretation of Telemetry: sinus rhythm

## 2018-12-05 NOTE — PROGRESS NOTE ADULT - SUBJECTIVE AND OBJECTIVE BOX
CC: f/u for MRSA suppression    Patient reports comfortable, no SOB at rest, afebrile    REVIEW OF SYSTEMS:  All other review of systems negative (Comprehensive ROS)    Antimicrobials: Minocycline cancelled   Medications Reviewed    T(F): 97.7 (12-05-18 @ 11:38), Max: 100 (12-04-18 @ 22:12)  HR: 89 (12-05-18 @ 11:38)  BP: 147/87 (12-05-18 @ 11:38)  RR: 17 (12-05-18 @ 11:38)  SpO2: 100% (12-05-18 @ 11:38)  Wt(kg): --    PHYSICAL EXAM:  General: alert, no acute distress  Eyes:  anicteric, no conjunctival injection, no discharge  Oropharynx: no lesions or injection 	  Neck: supple, without adenopathy  Lungs: clear to auscultation anteriorly  Heart: irregular rhythm; no murmur, rubs or gallops  Abdomen: soft, nondistended, nontender, G tube site clean  Skin: no rash  Extremities: no edema.  R thigh incision healed, dry  Neurologic: alert, moves all extremities    LAB RESULTS:                        10.1   10.42 )-----------( 249      ( 05 Dec 2018 09:22 )             32.2     12-05    145  |  107  |  38<H>  ----------------------------<  115<H>  3.8   |  23  |  0.51    Ca    9.0      05 Dec 2018 07:21  Phos  4.1     12-04  Mg     1.9     12-04      MICROBIOLOGY:  RECENT CULTURES:  12-04 @ 09:38 .Blood Blood-Peripheral     No growth to date.    12-02 @ 17:48 .Blood Blood-Peripheral Blood Culture PCR    Growth in aerobic bottle: Coag Negative Staphylococcus    12-02 @ 17:44 .Urine Catheterized Pseudomonas aeruginosa (Carbapenem Resistant)    >100,000 CFU/ml Pseudomonas aeruginosa (Carbapenem Resistant)      RADIOLOGY REVIEWED:  VA Duplex Lower Ext Vein Scan, Bilat (12.03.18 @ 18:44) >  Bilateral calf veins could not be visualized, limiting evaluation. No   thrombosis in the remaining bilateral lower extremity deep veins.

## 2018-12-05 NOTE — PROGRESS NOTE ADULT - ASSESSMENT
68 year-old woman with Alzheimer's dementia seen to have volume overload and biventricular congestive heart failure.  TTE on this admission shows reduced LVEF, elevated pulmonary pressures.    Would give one more dose of Lasix 20mg IV x1, then continue Lasix with 40 mg daily via PEG.   Keep O > I.  Keep K > 4.0 and Mag > 2.0.  Monitor BUN/Cr with daily labs.    Given severely reduced LVEF, plan to continue ACEi and uptitrate as blood pressure permits.  Can uptitrate to lisinopril 10mg daily tomorrow morning.  Will defer starting beta-blocker in this patient as she has baseline restrictive airway disease and is beta-agonist inhalers. Can reconsider low dose carvedilol as outpatient.    Case discussed at length with patient's  at bedside.  Case discussed with primary care physician and admitting hospitalist, Deniz Bolden MD.  Case discussed with ID attending, Misha Krause MD.

## 2018-12-05 NOTE — PROGRESS NOTE ADULT - ASSESSMENT
chf exacerbation   cvs  hyperlipidemia   asthma   dementia   chronic pain form right hip  medical marijuana for chronic pain noted

## 2018-12-05 NOTE — PROGRESS NOTE ADULT - SUBJECTIVE AND OBJECTIVE BOX
---___---___---___---___---___---___ ---___---___---___---___---___---___---___---___---___---                    <<<  M E D I C A L   A T T E N D I N G    F O L L O W    U P   N O T E  >>>    - Patient seen and examined by me approximately thirty minutes ago.  - In summary, patient is a 68y year old Female who origianlly presented with elevated bnp and lower extremity edema  - Patient today overall doing ok, comfortable, eating OK.     ---___---___---___---___---___---      <<<  MEDICATIONS:  >>>    MEDICATIONS  (STANDING):  ALBUTerol/ipratropium for Nebulization 3 milliLiter(s) Nebulizer every 6 hours  buDESOnide 160 MICROgram(s)/formoterol 4.5 MICROgram(s) Inhaler 2 Puff(s) Inhalation two times a day  clobetasol 0.05% Ointment 1 Application(s) Topical two times a day  clonazePAM Tablet 1.5 milliGRAM(s) Oral at bedtime  clopidogrel Tablet 75 milliGRAM(s) Oral daily  dextrose 5%. 1000 milliLiter(s) (50 mL/Hr) IV Continuous <Continuous>  dextrose 50% Injectable 12.5 Gram(s) IV Push once  dextrose 50% Injectable 25 Gram(s) IV Push once  dextrose 50% Injectable 25 Gram(s) IV Push once  Doxycycline calcium suspension 10mg/ml 10 milliLiter(s) 10 milliLiter(s) Oral two times a day  fentaNYL   Patch  12 MICROgram(s)/Hr 1 Patch Transdermal every 72 hours  ferrous    sulfate Liquid 300 milliGRAM(s) Enteral Tube daily  gabapentin   Solution 300 milliGRAM(s) Oral two times a day  insulin lispro (HumaLOG) corrective regimen sliding scale   SubCutaneous every 6 hours  lisinopril 5 milliGRAM(s) Oral daily  medical marijuana oil 1 milliLiter(s) 1 milliLiter(s) SubLingual <User Schedule>  mirtazapine 7.5 milliGRAM(s) Oral at bedtime  pantoprazole   Suspension 40 milliGRAM(s) Oral before breakfast  predniSONE   Tablet 7.5 milliGRAM(s) Oral <User Schedule>  QUEtiapine 25 milliGRAM(s) Oral at bedtime  sertraline 50 milliGRAM(s) Oral daily  tiotropium 18 MICROgram(s) Capsule 1 Capsule(s) Inhalation daily  tiZANidine 4 milliGRAM(s) Oral <User Schedule>      MEDICATIONS  (PRN):  acetaminophen    Suspension .. 680 milliGRAM(s) Oral every 6 hours PRN Temp greater or equal to 38C (100.4F), Mild Pain (1 - 3)  dextrose 40% Gel 15 Gram(s) Oral once PRN Blood Glucose LESS THAN 70 milliGRAM(s)/deciliter  glucagon  Injectable 1 milliGRAM(s) IntraMuscular once PRN Glucose LESS THAN 70 milligrams/deciliter  nystatin Powder 1 Application(s) Topical two times a day PRN rash/itchiness       ---___---___---___---___---___---     <<<REVIEW OF SYSTEM: >>>    GEN: no fever, no chills, no pain  RESP: no SOB, no cough, no sputum  CVS: no chest pain, no palpitations, no edema  GI: no abdominal pain, no nausea, no vomiting, no constipation, no diarrhea  : no dysurea, no frequency  NEURO: no headache, no dizziness  PSYCH: no depression, not anxious  Derm : no itching, no rash     ---___---___---___---___---___---          <<<  VITAL SIGNS: >>>    T(F): 98.8 (12-05-18 @ 04:09), Max: 100 (12-04-18 @ 22:12)  HR: 92 (12-05-18 @ 04:09) (92 - 98)  BP: 158/91 (12-05-18 @ 04:09) (120/63 - 158/91)  RR: 19 (12-05-18 @ 04:09) (18 - 19)  SpO2: 95% (12-05-18 @ 04:09) (95% - 97%)  Wt(kg): --  CAPILLARY BLOOD GLUCOSE      POCT Blood Glucose.: 134 mg/dL (05 Dec 2018 06:27)    I&O's Summary    04 Dec 2018 07:01  -  05 Dec 2018 07:00  --------------------------------------------------------  IN: 0 mL / OUT: 700 mL / NET: -700 mL    05 Dec 2018 07:01  -  05 Dec 2018 10:54  --------------------------------------------------------  IN: 0 mL / OUT: 0 mL / NET: 0 mL         ---___---___---___---___---___---                       PHYSICAL EXAM:    GEN: A&O X 2 , NAD , comfortable  HEENT: NCAT, PERRL, MMM, no scleral icterus, hearing intact  NECK: Supple, No JVD  CVS: S1S2 , regular , No M/R/G appreciated  PULM: CTA B/L,  no W/R/R appreciated  ABD.: soft. non tender, non distended,  bowel sounds present peg noted   Extrem: intact pulses , no edema noted  Derm: No rash or ecchymosis noted  PSYCH: depressed mood,  depression, not anxious     ---___---___---___---___---___---     <<<  LAB AND IMAGING: >>>                          10.1   10.70 )-----------( 298      ( 04 Dec 2018 08:01 )             32.0               12-05    145  |  107  |  38<H>  ----------------------------<  115<H>  3.8   |  23  |  0.51    Ca    9.0      05 Dec 2018 07:21  Phos  4.1     12-04  Mg     1.9     12-04                                 [All pertinent / recent available Imaging reports and other labs reviewed]     ---___---___---___---___---___---___ ---___---___---___---___---           <<<  A S S E S S M E N T   A N D   P L A N :  >>>          -GI/DVT Prophylaxis.    --------------------------------------------       >>______________________<<      Deniz Bolden .         phone   [phone]

## 2018-12-06 DIAGNOSIS — K51.90 ULCERATIVE COLITIS, UNSPECIFIED, WITHOUT COMPLICATIONS: ICD-10-CM

## 2018-12-06 LAB
ANION GAP SERPL CALC-SCNC: 16 MMOL/L — SIGNIFICANT CHANGE UP (ref 5–17)
BUN SERPL-MCNC: 36 MG/DL — HIGH (ref 7–23)
CALCIUM SERPL-MCNC: 9.4 MG/DL — SIGNIFICANT CHANGE UP (ref 8.4–10.5)
CHLORIDE SERPL-SCNC: 106 MMOL/L — SIGNIFICANT CHANGE UP (ref 96–108)
CO2 SERPL-SCNC: 22 MMOL/L — SIGNIFICANT CHANGE UP (ref 22–31)
CREAT SERPL-MCNC: 0.47 MG/DL — LOW (ref 0.5–1.3)
GLUCOSE BLDC GLUCOMTR-MCNC: 112 MG/DL — HIGH (ref 70–99)
GLUCOSE BLDC GLUCOMTR-MCNC: 120 MG/DL — HIGH (ref 70–99)
GLUCOSE BLDC GLUCOMTR-MCNC: 128 MG/DL — HIGH (ref 70–99)
GLUCOSE BLDC GLUCOMTR-MCNC: 155 MG/DL — HIGH (ref 70–99)
GLUCOSE SERPL-MCNC: 98 MG/DL — SIGNIFICANT CHANGE UP (ref 70–99)
HCT VFR BLD CALC: 32.7 % — LOW (ref 34.5–45)
HGB BLD-MCNC: 10.2 G/DL — LOW (ref 11.5–15.5)
MCHC RBC-ENTMCNC: 30.6 PG — SIGNIFICANT CHANGE UP (ref 27–34)
MCHC RBC-ENTMCNC: 31.2 GM/DL — LOW (ref 32–36)
MCV RBC AUTO: 98.2 FL — SIGNIFICANT CHANGE UP (ref 80–100)
PLATELET # BLD AUTO: 268 K/UL — SIGNIFICANT CHANGE UP (ref 150–400)
POTASSIUM SERPL-MCNC: 3.8 MMOL/L — SIGNIFICANT CHANGE UP (ref 3.5–5.3)
POTASSIUM SERPL-SCNC: 3.8 MMOL/L — SIGNIFICANT CHANGE UP (ref 3.5–5.3)
RBC # BLD: 3.33 M/UL — LOW (ref 3.8–5.2)
RBC # FLD: 17.7 % — HIGH (ref 10.3–14.5)
SODIUM SERPL-SCNC: 144 MMOL/L — SIGNIFICANT CHANGE UP (ref 135–145)
WBC # BLD: 11.54 K/UL — HIGH (ref 3.8–10.5)
WBC # FLD AUTO: 11.54 K/UL — HIGH (ref 3.8–10.5)

## 2018-12-06 PROCEDURE — 99223 1ST HOSP IP/OBS HIGH 75: CPT

## 2018-12-06 RX ADMIN — LISINOPRIL 5 MILLIGRAM(S): 2.5 TABLET ORAL at 06:45

## 2018-12-06 RX ADMIN — Medication 3 MILLILITER(S): at 23:09

## 2018-12-06 RX ADMIN — CLOPIDOGREL BISULFATE 75 MILLIGRAM(S): 75 TABLET, FILM COATED ORAL at 12:19

## 2018-12-06 RX ADMIN — Medication 7.5 MILLIGRAM(S): at 08:53

## 2018-12-06 RX ADMIN — FENTANYL CITRATE 1 PATCH: 50 INJECTION INTRAVENOUS at 17:39

## 2018-12-06 RX ADMIN — Medication 3 MILLILITER(S): at 12:20

## 2018-12-06 RX ADMIN — Medication 1 APPLICATION(S): at 07:49

## 2018-12-06 RX ADMIN — Medication 3 MILLILITER(S): at 18:01

## 2018-12-06 RX ADMIN — Medication 1.5 MILLIGRAM(S): at 22:17

## 2018-12-06 RX ADMIN — MINOCYCLINE HYDROCHLORIDE 100 MILLIGRAM(S): 45 TABLET, EXTENDED RELEASE ORAL at 06:45

## 2018-12-06 RX ADMIN — MIRTAZAPINE 7.5 MILLIGRAM(S): 45 TABLET, ORALLY DISINTEGRATING ORAL at 22:17

## 2018-12-06 RX ADMIN — TIOTROPIUM BROMIDE 1 CAPSULE(S): 18 CAPSULE ORAL; RESPIRATORY (INHALATION) at 12:19

## 2018-12-06 RX ADMIN — FENTANYL CITRATE 1 PATCH: 50 INJECTION INTRAVENOUS at 12:33

## 2018-12-06 RX ADMIN — FENTANYL CITRATE 1 PATCH: 50 INJECTION INTRAVENOUS at 19:36

## 2018-12-06 RX ADMIN — BUDESONIDE AND FORMOTEROL FUMARATE DIHYDRATE 2 PUFF(S): 160; 4.5 AEROSOL RESPIRATORY (INHALATION) at 07:50

## 2018-12-06 RX ADMIN — PANTOPRAZOLE SODIUM 40 MILLIGRAM(S): 20 TABLET, DELAYED RELEASE ORAL at 06:45

## 2018-12-06 RX ADMIN — Medication 3 MILLILITER(S): at 03:13

## 2018-12-06 RX ADMIN — QUETIAPINE FUMARATE 25 MILLIGRAM(S): 200 TABLET, FILM COATED ORAL at 22:18

## 2018-12-06 RX ADMIN — Medication 3 MILLILITER(S): at 07:50

## 2018-12-06 RX ADMIN — MINOCYCLINE HYDROCHLORIDE 100 MILLIGRAM(S): 45 TABLET, EXTENDED RELEASE ORAL at 18:03

## 2018-12-06 RX ADMIN — Medication 1 APPLICATION(S): at 18:03

## 2018-12-06 RX ADMIN — SERTRALINE 50 MILLIGRAM(S): 25 TABLET, FILM COATED ORAL at 12:19

## 2018-12-06 RX ADMIN — FENTANYL CITRATE 1 PATCH: 50 INJECTION INTRAVENOUS at 17:58

## 2018-12-06 RX ADMIN — GABAPENTIN 300 MILLIGRAM(S): 400 CAPSULE ORAL at 18:02

## 2018-12-06 RX ADMIN — Medication 1 APPLICATION(S): at 23:09

## 2018-12-06 RX ADMIN — TIZANIDINE 4 MILLIGRAM(S): 4 TABLET ORAL at 08:53

## 2018-12-06 RX ADMIN — Medication 300 MILLIGRAM(S): at 12:19

## 2018-12-06 RX ADMIN — BUDESONIDE AND FORMOTEROL FUMARATE DIHYDRATE 2 PUFF(S): 160; 4.5 AEROSOL RESPIRATORY (INHALATION) at 18:01

## 2018-12-06 RX ADMIN — TIZANIDINE 4 MILLIGRAM(S): 4 TABLET ORAL at 20:58

## 2018-12-06 RX ADMIN — Medication 1 APPLICATION(S): at 14:30

## 2018-12-06 RX ADMIN — Medication 1: at 12:32

## 2018-12-06 RX ADMIN — GABAPENTIN 300 MILLIGRAM(S): 400 CAPSULE ORAL at 06:45

## 2018-12-06 NOTE — ADVANCED PRACTICE NURSE CONSULT - REASON FOR CONSULT
Requested by staff to assess skin status of pt a/w multiple pressure ulcers. 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 Per the family, her breathing is normal (somewhat SOB at baseline). Her PMD 5 days ago got some blood tests sent off which resulted in an elevated proBNP. She was sent in for an evaluation. They deny fevers, chills, coughing, skin rashes. They have been feeding her via the PEG on time, and have attempted to flush with water as instructed. At times, when her residuals ate elevated, they would back off from the extra flushes. Vital signs in ED: HR 93, /85, RR 16, 95-97% RA   Past Medical History:  Acid reflux    Anxiety    Asthma    CVA (cerebral vascular accident)    Depression    Fatty pancreas    HLD (hyperlipidemia)    IGT (impaired glucose tolerance)    Mouth sores    PNA (pneumonia)    Pulmonary HTN    Ulcerative colitis.     Past Surgical History:  H/O cataract extraction, left    H/O: hysterectomy    History of knee replacement    Humeral head fracture.

## 2018-12-06 NOTE — PROGRESS NOTE ADULT - ASSESSMENT
68 year-old woman with Alzheimer's dementia seen to have volume overload and biventricular congestive heart failure.  TTE on this admission shows reduced LVEF, elevated pulmonary pressures.    Change to Lasix 40 mg via PEG every-other-day.  Keep O > I.  Keep K > 4.0 and Mag > 2.0.  Monitor BUN/Cr with daily labs until discharge.    Given severely reduced LVEF, plan to continue ACEi and uptitrate as blood pressure permits.  Can uptitrate to lisinopril 10mg daily tomorrow morning.  Will defer starting beta-blocker in this patient as she has baseline restrictive airway disease and is beta-agonist inhalers. Can reconsider low dose carvedilol as outpatient.    Case discussed at length with patient's  at bedside.  Case discussed with primary care physician and admitting hospitalist, Deniz Bolden MD.

## 2018-12-06 NOTE — PROGRESS NOTE ADULT - SUBJECTIVE AND OBJECTIVE BOX
CC: f/u for MDR pseudomonas in urine and MRSA suppresion    Patient reports: she is attentive and appears comfortable, not verbal    REVIEW OF SYSTEMS:  All other review of systems negative (Comprehensive ROS)    Antimicrobials Day #  :long term  minocycline 100 milliGRAM(s) Oral two times a day    Other Medications Reviewed    T(F): 98 (12-06-18 @ 11:55), Max: 98 (12-06-18 @ 11:55)  HR: 89 (12-06-18 @ 11:55)  BP: 124/72 (12-06-18 @ 11:55)  RR: 17 (12-06-18 @ 11:55)  SpO2: 98% (12-06-18 @ 11:55)  Wt(kg): --    PHYSICAL EXAM:  General: alert, no acute distress  Eyes:  anicteric, no conjunctival injection, no discharge  Oropharynx: no lesions or injection 	  Neck: supple, without adenopathy  Lungs: clear to auscultation  Heart: regular rate and rhythm; no murmur, rubs or gallops  Abdomen: soft, nondistended, nontender, without mass or organomegaly  Skin: no lesions  Extremities: contracted  Neurologic: alert, baseline dementia, moves all extremities    LAB RESULTS:                        10.2   11.54 )-----------( 268      ( 06 Dec 2018 09:06 )             32.7     12-06    144  |  106  |  36<H>  ----------------------------<  98  3.8   |  22  |  0.47<L>    Ca    9.4      06 Dec 2018 07:13          MICROBIOLOGY:  RECENT CULTURES:  12-04 @ 14:59 .Blood Blood-Venous     No growth to date.      12-04 @ 09:38 .Blood Blood-Peripheral     No growth to date.      12-02 @ 17:48 .Blood Blood-Peripheral Blood Culture PCR    Growth in aerobic bottle: Coag Negative Staphylococcus "Susceptibilities  not performed"      12-02 @ 17:44 .Urine Catheterized Pseudomonas aeruginosa (Carbapenem Resistant)    >100,000 CFU/ml Pseudomonas aeruginosa (Carbapenem Resistant)          RADIOLOGY REVIEWED:    < from: Xray Chest 1 View- PORTABLE-Urgent (12.02.18 @ 17:19) >  IMPRESSION:   Limited study due to patient positioning.  Trace right pleural effusion and likely left pleural effusion. Question   mild pulmonary edema.    < end of copied text >

## 2018-12-06 NOTE — ADVANCED PRACTICE NURSE CONSULT - RECOMMEDATIONS
Will recommend the followin. Sacrum: Cavilon to periwound skin, Medihoney paste to the non-viable tissue, follow with foam dressing. change every 3days and prn for drainage/soiling.  2. Continue with turning and positioning  3. nutrition support as pt condition allows  Tx plan discussed with RN

## 2018-12-06 NOTE — ADVANCED PRACTICE NURSE CONSULT - ASSESSMENT
Pts  at bedside - reports that the wound has been present for several weeks- states it occurred at the prior facility.  upon assessment, pt presents with an evolving deep tissue injury to the sacrum measuring 3cm x 5.5cm x 0.2cm the wound presents with 50% yellow slough and 50% deep purple tissue. the periwound skin presents with blanchable erythema, there is no induration or fluctuance.  there is scant serosanguinous drainage. No odor was noted.  The pt has a protuberant sacral bone- will recommend the application of Medihoney paste to the non-viable tissue; follow with a foam to cushion and promote moist healing.  Pt appears thin, frail, she has contractures of the upper and lower extremities.  A low air-loss surface is in use and the pt is being assisted with turning and positioning as per review of the nursing documentation. staff have applied z-rey boots to off-load the heels. Due to contractures, the heels are being off-loaded with the gel in the cushions. the heels appear to be intact at this time.  The pt receives enteral feeds via a PEG and is being followed by nutrition.

## 2018-12-06 NOTE — PROGRESS NOTE ADULT - SUBJECTIVE AND OBJECTIVE BOX
---___---___---___---___---___---___ ---___---___---___---___---___---___---___---___---___---                    <<<  M E D I C A L   A T T E N D I N G    F O L L O W    U P   N O T E  >>>    - Patient seen and examined by me approximately thirty minutes ago.  - In summary, patient is a 68y year old Female who origianlly presented with elevated bnp and swollen legs   - Patient today overall doing ok, comfortable, eating OK.     ---___---___---___---___---___---      <<<  MEDICATIONS:  >>>    MEDICATIONS  (STANDING):  ALBUTerol/ipratropium for Nebulization 3 milliLiter(s) Nebulizer every 6 hours  AQUAPHOR (petrolatum Ointment) 1 Application(s) Topical three times a day  buDESOnide 160 MICROgram(s)/formoterol 4.5 MICROgram(s) Inhaler 2 Puff(s) Inhalation two times a day  clobetasol 0.05% Ointment 1 Application(s) Topical two times a day  clonazePAM Tablet 1.5 milliGRAM(s) Oral at bedtime  clopidogrel Tablet 75 milliGRAM(s) Oral daily  dextrose 5%. 1000 milliLiter(s) (50 mL/Hr) IV Continuous <Continuous>  dextrose 50% Injectable 12.5 Gram(s) IV Push once  dextrose 50% Injectable 25 Gram(s) IV Push once  dextrose 50% Injectable 25 Gram(s) IV Push once  fentaNYL   Patch  12 MICROgram(s)/Hr 1 Patch Transdermal every 72 hours  ferrous    sulfate Liquid 300 milliGRAM(s) Enteral Tube daily  gabapentin   Solution 300 milliGRAM(s) Oral two times a day  insulin lispro (HumaLOG) corrective regimen sliding scale   SubCutaneous every 6 hours  lisinopril 5 milliGRAM(s) Oral daily  medical marijuana oil 1 milliLiter(s) 1 milliLiter(s) SubLingual <User Schedule>  minocycline 100 milliGRAM(s) Oral two times a day  mirtazapine 7.5 milliGRAM(s) Oral at bedtime  pantoprazole   Suspension 40 milliGRAM(s) Oral before breakfast  predniSONE   Tablet 7.5 milliGRAM(s) Oral <User Schedule>  QUEtiapine 25 milliGRAM(s) Oral at bedtime  sertraline 50 milliGRAM(s) Oral daily  tiotropium 18 MICROgram(s) Capsule 1 Capsule(s) Inhalation daily  tiZANidine 4 milliGRAM(s) Oral <User Schedule>      MEDICATIONS  (PRN):  acetaminophen    Suspension .. 680 milliGRAM(s) Oral every 6 hours PRN Temp greater or equal to 38C (100.4F), Mild Pain (1 - 3)  dextrose 40% Gel 15 Gram(s) Oral once PRN Blood Glucose LESS THAN 70 milliGRAM(s)/deciliter  glucagon  Injectable 1 milliGRAM(s) IntraMuscular once PRN Glucose LESS THAN 70 milligrams/deciliter  nystatin Powder 1 Application(s) Topical two times a day PRN rash/itchiness       ---___---___---___---___---___---     <<<REVIEW OF SYSTEM: >>>    GEN: no fever, no chills, no pain  RESP: no SOB, no cough, no sputum  CVS: no chest pain, no palpitations, no edema  GI: no abdominal pain, no nausea, no vomiting, no constipation, no diarrhea  : no dysurea, no frequency  NEURO: no headache, no dizziness  PSYCH: no depression, not anxious  Derm : no itching, no rash     ---___---___---___---___---___---          <<<  VITAL SIGNS: >>>    T(F): 98 (12-06-18 @ 11:55), Max: 98 (12-06-18 @ 11:55)  HR: 89 (12-06-18 @ 11:55) (86 - 98)  BP: 124/72 (12-06-18 @ 11:55) (124/72 - 149/89)  RR: 17 (12-06-18 @ 11:55) (17 - 18)  SpO2: 98% (12-06-18 @ 11:55) (94% - 99%)  Wt(kg): --  CAPILLARY BLOOD GLUCOSE      POCT Blood Glucose.: 128 mg/dL (06 Dec 2018 17:31)    I&O's Summary    05 Dec 2018 07:01  -  06 Dec 2018 07:00  --------------------------------------------------------  IN: 0 mL / OUT: 600 mL / NET: -600 mL    06 Dec 2018 07:01  -  06 Dec 2018 19:26  --------------------------------------------------------  IN: 600 mL / OUT: 250 mL / NET: 350 mL         ---___---___---___---___---___---                       PHYSICAL EXAM:    GEN: A&O X 2 , NAD , comfortable  HEENT: NCAT, PERRL, MMM, no scleral icterus, hearing intact  NECK: Supple, No JVD  CVS: S1S2 , regular , No M/R/G appreciated  PULM: CTA B/L,  no W/R/R appreciated  ABD.: soft. non tender, non distended,  bowel sounds present peg noted   Extrem: intact pulses , no edema noted  Derm: No rash or ecchymosis noted        ---___---___---___---___---___---     <<<  LAB AND IMAGING: >>>                          10.2   11.54 )-----------( 268      ( 06 Dec 2018 09:06 )             32.7               12-06    144  |  106  |  36<H>  ----------------------------<  98  3.8   |  22  |  0.47<L>    Ca    9.4      06 Dec 2018 07:13                                 [All pertinent / recent available Imaging reports and other labs reviewed]     ---___---___---___---___---___---___ ---___---___---___---___---           <<<  A S S E S S M E N T   A N D   P L A N :  >>>          -GI/DVT Prophylaxis.    --------------------------------------------       >>______________________<<      Deniz Bolden .         phone   [phone]

## 2018-12-06 NOTE — PROGRESS NOTE ADULT - ASSESSMENT
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

## 2018-12-06 NOTE — PROGRESS NOTE ADULT - SUBJECTIVE AND OBJECTIVE BOX
HPI:  Patient seen and examined with patient's  at bedside.  Patient is resting comfortably.    Review Of Systems:      Unable to obtain review of systems in patient with severe dementia    Medications:  acetaminophen    Suspension .. 680 milliGRAM(s) Oral every 6 hours PRN  ALBUTerol/ipratropium for Nebulization 3 milliLiter(s) Nebulizer every 6 hours  AQUAPHOR (petrolatum Ointment) 1 Application(s) Topical three times a day  buDESOnide 160 MICROgram(s)/formoterol 4.5 MICROgram(s) Inhaler 2 Puff(s) Inhalation two times a day  clobetasol 0.05% Ointment 1 Application(s) Topical two times a day  clonazePAM Tablet 1.5 milliGRAM(s) Oral at bedtime  clopidogrel Tablet 75 milliGRAM(s) Oral daily  dextrose 40% Gel 15 Gram(s) Oral once PRN  dextrose 5%. 1000 milliLiter(s) IV Continuous <Continuous>  dextrose 50% Injectable 12.5 Gram(s) IV Push once  dextrose 50% Injectable 25 Gram(s) IV Push once  dextrose 50% Injectable 25 Gram(s) IV Push once  fentaNYL   Patch  12 MICROgram(s)/Hr 1 Patch Transdermal every 72 hours  ferrous    sulfate Liquid 300 milliGRAM(s) Enteral Tube daily  gabapentin   Solution 300 milliGRAM(s) Oral two times a day  glucagon  Injectable 1 milliGRAM(s) IntraMuscular once PRN  insulin lispro (HumaLOG) corrective regimen sliding scale   SubCutaneous every 6 hours  lisinopril 5 milliGRAM(s) Oral daily  medical marijuana oil 1 milliLiter(s) 1 milliLiter(s) SubLingual <User Schedule>  minocycline 100 milliGRAM(s) Oral two times a day  mirtazapine 7.5 milliGRAM(s) Oral at bedtime  nystatin Powder 1 Application(s) Topical two times a day PRN  pantoprazole   Suspension 40 milliGRAM(s) Oral before breakfast  predniSONE   Tablet 7.5 milliGRAM(s) Oral <User Schedule>  QUEtiapine 25 milliGRAM(s) Oral at bedtime  sertraline 50 milliGRAM(s) Oral daily  tiotropium 18 MICROgram(s) Capsule 1 Capsule(s) Inhalation daily  tiZANidine 4 milliGRAM(s) Oral <User Schedule>    PAST MEDICAL & SURGICAL HISTORY:  CVA (cerebral vascular accident)  Fatty pancreas  PNA (pneumonia)  Pulmonary HTN  IGT (impaired glucose tolerance)  Ulcerative colitis  Acid reflux  Anxiety  Depression  Mouth sores  HLD (hyperlipidemia)  Asthma  Humeral head fracture  H/O: hysterectomy  H/O cataract extraction, left  History of knee replacement    Vitals:  T(C): 36.5 (12-06-18 @ 22:45), Max: 36.8 (12-06-18 @ 20:52)  HR: 105 (12-06-18 @ 20:52) (86 - 105)  BP: 131/83 (12-06-18 @ 20:52) (124/72 - 131/83)  BP(mean): --  RR: 18 (12-06-18 @ 20:52) (17 - 18)  SpO2: 97% (12-06-18 @ 20:52) (94% - 98%)  Wt(kg): --  Daily     Daily   I&O's Summary    05 Dec 2018 07:01  -  06 Dec 2018 07:00  --------------------------------------------------------  IN: 0 mL / OUT: 600 mL / NET: -600 mL    06 Dec 2018 07:01  -  06 Dec 2018 23:17  --------------------------------------------------------  IN: 600 mL / OUT: 450 mL / NET: 150 mL        Physical Exam:  Appearance: appears older than stated age; bedbound, demented; no acute distress  Eyes: PERRL, EOMI, pink conjunctiva  HENT: Normal oral mucosa  Cardiovascular: RRR, S1, S2; trace LE edema bilaterally; +JVD  Respiratory: fine bibasilar rales  Gastrointestinal: soft, non-tender, non-distended with normal bowel sounds; +PEG  Musculoskeletal: Bedbound, contracted  Neurologic: cranial nerves grossly intact  Lymphatic: No lymphadenopathy  Psychiatry: awake and alert  Skin: No rashes, ecchymoses, or cyanosis                            10.2   11.54 )-----------( 268      ( 06 Dec 2018 09:06 )             32.7     12-06    144  |  106  |  36<H>  ----------------------------<  98  3.8   |  22  |  0.47<L>    Ca    9.4      06 Dec 2018 07:13            Serum Pro-Brain Natriuretic Peptide: 19685 pg/mL (12-02 @ 14:50)        Echo: < from: Transthoracic Echocardiogram (12.03.18 @ 11:23) >  ------------------------------------------------------------------------  Conclusions:  1. Mitral annular calcification, otherwise normal mitral  valve. Moderate-severe mitral regurgitation (vena contracta  0.8 cm)  2. Calcified trileafletaortic valve with normal opening.  Peak transaortic valve gradient equals 3 mm Hg, mean  transaortic valve gradient equals 1 mm Hg, aortic valve  velocity time integral equals 13 cm. Moderate aortic  regurgitation  3. Moderately dilated left atrium.LA volume index = 45  cc/m2.  4. Severe global left ventricular systolic dysfunction.  5. Increased E/e'  is consistent with elevated left  ventricular filling pressure.  6. Moderate right atrial enlargement. Inferior vena cava is  dilated (>=2.5cm) with normal respiratory variability  consistent with right atrial pressure 16-20mm Hg.  7. Right ventricular enlargement with decreased right  ventricular systolic function.  8. Normal tricuspid valve. Severe tricuspid regurgitation.  9. Estimated pulmonary artery systolic pressure equals 82  mm Hg, assuming right atrial pressure equals 8 mm Hg,  consistent with severe pulmonary pressures.  10. Color Doppler demonstrates evidence of left to right  shunt  ------------------------------------------------------------------------  Confirmed on  12/3/2018 - 16:37:07 by Margaret Khan M.D.  ------------------------------------------------------------------------    < end of copied text >    Imaging: < from: Xray Chest 1 View- PORTABLE-Urgent (12.02.18 @ 17:19) >  IMPRESSION:   Limited study due to patient positioning.  Trace right pleural effusion and likely left pleural effusion. Question   mild pulmonary edema.    HUAN JACOBS M.D., RADIOLOGY RESIDENT  This document has been electronically signed.  TONYA AARON M.D., ATTENDING RADIOLOGIST  This document has been electronically signed. Dec  2 2018  9:17PM      < end of copied text >    Interpretation of Telemetry: sinus rhythm

## 2018-12-07 LAB
ANION GAP SERPL CALC-SCNC: 13 MMOL/L — SIGNIFICANT CHANGE UP (ref 5–17)
BUN SERPL-MCNC: 38 MG/DL — HIGH (ref 7–23)
CALCIUM SERPL-MCNC: 9.3 MG/DL — SIGNIFICANT CHANGE UP (ref 8.4–10.5)
CHLORIDE SERPL-SCNC: 105 MMOL/L — SIGNIFICANT CHANGE UP (ref 96–108)
CO2 SERPL-SCNC: 24 MMOL/L — SIGNIFICANT CHANGE UP (ref 22–31)
CREAT SERPL-MCNC: 0.49 MG/DL — LOW (ref 0.5–1.3)
CULTURE RESULTS: SIGNIFICANT CHANGE UP
GLUCOSE BLDC GLUCOMTR-MCNC: 130 MG/DL — HIGH (ref 70–99)
GLUCOSE BLDC GLUCOMTR-MCNC: 142 MG/DL — HIGH (ref 70–99)
GLUCOSE BLDC GLUCOMTR-MCNC: 142 MG/DL — HIGH (ref 70–99)
GLUCOSE BLDC GLUCOMTR-MCNC: 165 MG/DL — HIGH (ref 70–99)
GLUCOSE SERPL-MCNC: 125 MG/DL — HIGH (ref 70–99)
HCT VFR BLD CALC: 32.6 % — LOW (ref 34.5–45)
HGB BLD-MCNC: 10.2 G/DL — LOW (ref 11.5–15.5)
MAGNESIUM SERPL-MCNC: 1.7 MG/DL — SIGNIFICANT CHANGE UP (ref 1.6–2.6)
MCHC RBC-ENTMCNC: 30 PG — SIGNIFICANT CHANGE UP (ref 27–34)
MCHC RBC-ENTMCNC: 31.3 GM/DL — LOW (ref 32–36)
MCV RBC AUTO: 95.9 FL — SIGNIFICANT CHANGE UP (ref 80–100)
PHOSPHATE SERPL-MCNC: 3.3 MG/DL — SIGNIFICANT CHANGE UP (ref 2.5–4.5)
PLATELET # BLD AUTO: 245 K/UL — SIGNIFICANT CHANGE UP (ref 150–400)
POTASSIUM SERPL-MCNC: 3.7 MMOL/L — SIGNIFICANT CHANGE UP (ref 3.5–5.3)
POTASSIUM SERPL-SCNC: 3.7 MMOL/L — SIGNIFICANT CHANGE UP (ref 3.5–5.3)
RBC # BLD: 3.4 M/UL — LOW (ref 3.8–5.2)
RBC # FLD: 17.8 % — HIGH (ref 10.3–14.5)
SODIUM SERPL-SCNC: 142 MMOL/L — SIGNIFICANT CHANGE UP (ref 135–145)
SPECIMEN SOURCE: SIGNIFICANT CHANGE UP
WBC # BLD: 10.52 K/UL — HIGH (ref 3.8–10.5)
WBC # FLD AUTO: 10.52 K/UL — HIGH (ref 3.8–10.5)

## 2018-12-07 PROCEDURE — 99233 SBSQ HOSP IP/OBS HIGH 50: CPT

## 2018-12-07 RX ADMIN — QUETIAPINE FUMARATE 25 MILLIGRAM(S): 200 TABLET, FILM COATED ORAL at 22:30

## 2018-12-07 RX ADMIN — Medication 3 MILLILITER(S): at 13:33

## 2018-12-07 RX ADMIN — FENTANYL CITRATE 1 PATCH: 50 INJECTION INTRAVENOUS at 08:14

## 2018-12-07 RX ADMIN — Medication 1 APPLICATION(S): at 22:36

## 2018-12-07 RX ADMIN — Medication 1 APPLICATION(S): at 18:11

## 2018-12-07 RX ADMIN — Medication 3 MILLILITER(S): at 06:06

## 2018-12-07 RX ADMIN — SERTRALINE 50 MILLIGRAM(S): 25 TABLET, FILM COATED ORAL at 12:17

## 2018-12-07 RX ADMIN — TIZANIDINE 4 MILLIGRAM(S): 4 TABLET ORAL at 08:35

## 2018-12-07 RX ADMIN — BUDESONIDE AND FORMOTEROL FUMARATE DIHYDRATE 2 PUFF(S): 160; 4.5 AEROSOL RESPIRATORY (INHALATION) at 06:07

## 2018-12-07 RX ADMIN — LISINOPRIL 5 MILLIGRAM(S): 2.5 TABLET ORAL at 06:06

## 2018-12-07 RX ADMIN — TIOTROPIUM BROMIDE 1 CAPSULE(S): 18 CAPSULE ORAL; RESPIRATORY (INHALATION) at 13:33

## 2018-12-07 RX ADMIN — BUDESONIDE AND FORMOTEROL FUMARATE DIHYDRATE 2 PUFF(S): 160; 4.5 AEROSOL RESPIRATORY (INHALATION) at 18:10

## 2018-12-07 RX ADMIN — PANTOPRAZOLE SODIUM 40 MILLIGRAM(S): 20 TABLET, DELAYED RELEASE ORAL at 06:06

## 2018-12-07 RX ADMIN — TIZANIDINE 4 MILLIGRAM(S): 4 TABLET ORAL at 21:25

## 2018-12-07 RX ADMIN — GABAPENTIN 300 MILLIGRAM(S): 400 CAPSULE ORAL at 06:20

## 2018-12-07 RX ADMIN — CLOPIDOGREL BISULFATE 75 MILLIGRAM(S): 75 TABLET, FILM COATED ORAL at 12:17

## 2018-12-07 RX ADMIN — Medication 1.5 MILLIGRAM(S): at 22:30

## 2018-12-07 RX ADMIN — Medication 1: at 19:10

## 2018-12-07 RX ADMIN — Medication 300 MILLIGRAM(S): at 12:17

## 2018-12-07 RX ADMIN — FENTANYL CITRATE 1 PATCH: 50 INJECTION INTRAVENOUS at 19:30

## 2018-12-07 RX ADMIN — MINOCYCLINE HYDROCHLORIDE 100 MILLIGRAM(S): 45 TABLET, EXTENDED RELEASE ORAL at 06:06

## 2018-12-07 RX ADMIN — Medication 3 MILLILITER(S): at 18:11

## 2018-12-07 RX ADMIN — GABAPENTIN 300 MILLIGRAM(S): 400 CAPSULE ORAL at 18:11

## 2018-12-07 RX ADMIN — Medication 1 APPLICATION(S): at 14:01

## 2018-12-07 RX ADMIN — Medication 1 APPLICATION(S): at 06:05

## 2018-12-07 RX ADMIN — MIRTAZAPINE 7.5 MILLIGRAM(S): 45 TABLET, ORALLY DISINTEGRATING ORAL at 22:30

## 2018-12-07 RX ADMIN — MINOCYCLINE HYDROCHLORIDE 100 MILLIGRAM(S): 45 TABLET, EXTENDED RELEASE ORAL at 18:11

## 2018-12-07 RX ADMIN — Medication 7.5 MILLIGRAM(S): at 10:35

## 2018-12-07 NOTE — PROGRESS NOTE ADULT - SUBJECTIVE AND OBJECTIVE BOX
HPI:  No events overnight.  Patient seen and examined on 4 Ravinder.  Patient reports that she is feeling fine, and she asked how I was feeling.   Her words were well articulated. She was holding her pink darien bear.    Review Of Systems:         ROS is unreliable in the setting of dementia  Respiratory: No shortness of breath, cough, or wheezing  Cardiovascular: No chest pain or palpitations  10 point review of systems is otherwise negative except as mentioned above        Medications:  acetaminophen    Suspension .. 680 milliGRAM(s) Oral every 6 hours PRN  ALBUTerol/ipratropium for Nebulization 3 milliLiter(s) Nebulizer every 6 hours  AQUAPHOR (petrolatum Ointment) 1 Application(s) Topical three times a day  buDESOnide 160 MICROgram(s)/formoterol 4.5 MICROgram(s) Inhaler 2 Puff(s) Inhalation two times a day  clobetasol 0.05% Ointment 1 Application(s) Topical two times a day  clonazePAM Tablet 1.5 milliGRAM(s) Oral at bedtime  clopidogrel Tablet 75 milliGRAM(s) Oral daily  dextrose 40% Gel 15 Gram(s) Oral once PRN  dextrose 5%. 1000 milliLiter(s) IV Continuous <Continuous>  dextrose 50% Injectable 12.5 Gram(s) IV Push once  dextrose 50% Injectable 25 Gram(s) IV Push once  dextrose 50% Injectable 25 Gram(s) IV Push once  fentaNYL   Patch  12 MICROgram(s)/Hr 1 Patch Transdermal every 72 hours  ferrous    sulfate Liquid 300 milliGRAM(s) Enteral Tube daily  gabapentin   Solution 300 milliGRAM(s) Oral two times a day  glucagon  Injectable 1 milliGRAM(s) IntraMuscular once PRN  insulin lispro (HumaLOG) corrective regimen sliding scale   SubCutaneous every 6 hours  lisinopril 5 milliGRAM(s) Oral daily  medical marijuana oil 1 milliLiter(s) 1 milliLiter(s) SubLingual <User Schedule>  minocycline 100 milliGRAM(s) Oral two times a day  mirtazapine 7.5 milliGRAM(s) Oral at bedtime  nystatin Powder 1 Application(s) Topical two times a day PRN  pantoprazole   Suspension 40 milliGRAM(s) Oral before breakfast  predniSONE   Tablet 7.5 milliGRAM(s) Oral <User Schedule>  QUEtiapine 25 milliGRAM(s) Oral at bedtime  sertraline 50 milliGRAM(s) Oral daily  tiotropium 18 MICROgram(s) Capsule 1 Capsule(s) Inhalation daily  tiZANidine 4 milliGRAM(s) Oral <User Schedule>    PAST MEDICAL & SURGICAL HISTORY:  CVA (cerebral vascular accident)  Fatty pancreas  PNA (pneumonia)  Pulmonary HTN  IGT (impaired glucose tolerance)  Ulcerative colitis  Acid reflux  Anxiety  Depression  Mouth sores  HLD (hyperlipidemia)  Asthma  Humeral head fracture  H/O: hysterectomy  H/O cataract extraction, left  History of knee replacement    Vitals:  T(C): 36.7 (12-07-18 @ 17:06), Max: 36.8 (12-06-18 @ 20:52)  HR: 93 (12-07-18 @ 17:06) (81 - 105)  BP: 129/70 (12-07-18 @ 17:06) (96/53 - 131/83)  BP(mean): --  RR: 18 (12-07-18 @ 17:06) (17 - 18)  SpO2: 97% (12-07-18 @ 17:06) (92% - 97%)  Wt(kg): --  Daily     Daily   I&O's Summary    06 Dec 2018 07:01  -  07 Dec 2018 07:00  --------------------------------------------------------  IN: 800 mL / OUT: 750 mL / NET: 50 mL    07 Dec 2018 07:01  -  07 Dec 2018 19:24  --------------------------------------------------------  IN: 600 mL / OUT: 400 mL / NET: 200 mL        Physical Exam:  Appearance: appears older than stated age; bedbound, demented; no acute distress  Eyes: PERRL, EOMI, pink conjunctiva  HENT: Normal oral mucosa  Cardiovascular: RRR, S1, S2; trace LE edema bilaterally; +JVD  Respiratory: fine bibasilar rales  Gastrointestinal: soft, non-tender, non-distended with normal bowel sounds; +PEG  Musculoskeletal: Bedbound, contracted  Neurologic: cranial nerves grossly intact  Lymphatic: No lymphadenopathy  Psychiatry: awake and alert  Skin: No rashes, ecchymoses, or cyanosis                              10.2   10.52 )-----------( 245      ( 07 Dec 2018 07:52 )             32.6     12-07    142  |  105  |  38<H>  ----------------------------<  125<H>  3.7   |  24  |  0.49<L>    Ca    9.3      07 Dec 2018 06:35  Phos  3.3     12-07  Mg     1.7     12-07            Serum Pro-Brain Natriuretic Peptide: 82019 pg/mL (12-02 @ 14:50)          Echo: < from: Transthoracic Echocardiogram (12.03.18 @ 11:23) >  ------------------------------------------------------------------------  Conclusions:  1. Mitral annular calcification, otherwise normal mitral  valve. Moderate-severe mitral regurgitation (vena contracta  0.8 cm)  2. Calcified trileafletaortic valve with normal opening.  Peak transaortic valve gradient equals 3 mm Hg, mean  transaortic valve gradient equals 1 mm Hg, aortic valve  velocity time integral equals 13 cm. Moderate aortic  regurgitation  3. Moderately dilated left atrium.LA volume index = 45  cc/m2.  4. Severe global left ventricular systolic dysfunction.  5. Increased E/e'  is consistent with elevated left  ventricular filling pressure.  6. Moderate right atrial enlargement. Inferior vena cava is  dilated (>=2.5cm) with normal respiratory variability  consistent with right atrial pressure 16-20mm Hg.  7. Right ventricular enlargement with decreased right  ventricular systolic function.  8. Normal tricuspid valve. Severe tricuspid regurgitation.  9. Estimated pulmonary artery systolic pressure equals 82  mm Hg, assuming right atrial pressure equals 8 mm Hg,  consistent with severe pulmonary pressures.  10. Color Doppler demonstrates evidence of left to right  shunt  ------------------------------------------------------------------------  Confirmed on  12/3/2018 - 16:37:07 by Margaret Khan M.D.  ------------------------------------------------------------------------    < end of copied text >    Imaging: < from: Xray Chest 1 View- PORTABLE-Urgent (12.02.18 @ 17:19) >  IMPRESSION:   Limited study due to patient positioning.  Trace right pleural effusion and likely left pleural effusion. Question   mild pulmonary edema.    HUAN JACOBS M.D., RADIOLOGY RESIDENT  This document has been electronically signed.  TONYA AARON M.D., ATTENDING RADIOLOGIST  This document has been electronically signed. Dec  2 2018  9:17PM      < end of copied text >    Interpretation of Telemetry: sinus rhythm

## 2018-12-07 NOTE — PROGRESS NOTE ADULT - SUBJECTIVE AND OBJECTIVE BOX
---___---___---___---___---___---___ ---___---___---___---___---___---___---___---___---___---                    <<<  M E D I C A L   A T T E N D I N G    F O L L O W    U P   N O T E  >>>    - Patient seen and examined by me approximately thirty minutes ago.  - In summary, patient is a 68y year old Female who origianlly presented with elevated bnp and swollen legs   - Patient today overall doing ok, comfortable, eating OK.     ---___---___---___---___---___---      <<<  MEDICATIONS:  >>>    MEDICATIONS  (STANDING):  ALBUTerol/ipratropium for Nebulization 3 milliLiter(s) Nebulizer every 6 hours  AQUAPHOR (petrolatum Ointment) 1 Application(s) Topical three times a day  buDESOnide 160 MICROgram(s)/formoterol 4.5 MICROgram(s) Inhaler 2 Puff(s) Inhalation two times a day  clobetasol 0.05% Ointment 1 Application(s) Topical two times a day  clonazePAM Tablet 1.5 milliGRAM(s) Oral at bedtime  clopidogrel Tablet 75 milliGRAM(s) Oral daily  dextrose 5%. 1000 milliLiter(s) (50 mL/Hr) IV Continuous <Continuous>  dextrose 50% Injectable 12.5 Gram(s) IV Push once  dextrose 50% Injectable 25 Gram(s) IV Push once  dextrose 50% Injectable 25 Gram(s) IV Push once  fentaNYL   Patch  12 MICROgram(s)/Hr 1 Patch Transdermal every 72 hours  ferrous    sulfate Liquid 300 milliGRAM(s) Enteral Tube daily  gabapentin   Solution 300 milliGRAM(s) Oral two times a day  insulin lispro (HumaLOG) corrective regimen sliding scale   SubCutaneous every 6 hours  lisinopril 5 milliGRAM(s) Oral daily  medical marijuana oil 1 milliLiter(s) 1 milliLiter(s) SubLingual <User Schedule>  minocycline 100 milliGRAM(s) Oral two times a day  mirtazapine 7.5 milliGRAM(s) Oral at bedtime  pantoprazole   Suspension 40 milliGRAM(s) Oral before breakfast  predniSONE   Tablet 7.5 milliGRAM(s) Oral <User Schedule>  QUEtiapine 25 milliGRAM(s) Oral at bedtime  sertraline 50 milliGRAM(s) Oral daily  tiotropium 18 MICROgram(s) Capsule 1 Capsule(s) Inhalation daily  tiZANidine 4 milliGRAM(s) Oral <User Schedule>      MEDICATIONS  (PRN):  acetaminophen    Suspension .. 680 milliGRAM(s) Oral every 6 hours PRN Temp greater or equal to 38C (100.4F), Mild Pain (1 - 3)  dextrose 40% Gel 15 Gram(s) Oral once PRN Blood Glucose LESS THAN 70 milliGRAM(s)/deciliter  glucagon  Injectable 1 milliGRAM(s) IntraMuscular once PRN Glucose LESS THAN 70 milligrams/deciliter  nystatin Powder 1 Application(s) Topical two times a day PRN rash/itchiness       ---___---___---___---___---___---     <<<REVIEW OF SYSTEM: >>>    GEN: no fever, no chills, no pain  RESP: no SOB, no cough, no sputum  CVS: no chest pain, no palpitations, no edema  GI: no abdominal pain, no nausea, no vomiting, no constipation, no diarrhea  : no dysurea, no frequency  NEURO: no headache, no dizziness  PSYCH: no depression, not anxious  Derm : no itching, no rash     ---___---___---___---___---___---          <<<  VITAL SIGNS: >>>    T(F): 98 (12-07-18 @ 04:29), Max: 98.2 (12-06-18 @ 20:52)  HR: 90 (12-07-18 @ 04:29) (89 - 105)  BP: 129/81 (12-07-18 @ 04:29) (124/72 - 131/83)  RR: 17 (12-07-18 @ 04:29) (17 - 18)  SpO2: 92% (12-07-18 @ 04:29) (92% - 98%)  Wt(kg): --  CAPILLARY BLOOD GLUCOSE  143 (06 Dec 2018 23:45)      POCT Blood Glucose.: 130 mg/dL (07 Dec 2018 05:12)    I&O's Summary    06 Dec 2018 07:01  -  07 Dec 2018 07:00  --------------------------------------------------------  IN: 800 mL / OUT: 750 mL / NET: 50 mL         ---___---___---___---___---___---                       PHYSICAL EXAM:    GEN: A&O X 2 , NAD , comfortable  HEENT: NCAT, PERRL, MMM, no scleral icterus, hearing intact  NECK: Supple, No JVD  CVS: S1S2 , regular , No M/R/G appreciated  PULM: CTA B/L,  no W/R/R appreciated  ABD.: soft. non tender, non distended,  bowel sounds present peg noted   Extrem: intact pulses , no edema noted  Derm: No rash or ecchymosis noted        ---___---___---___---___---___---     <<<  LAB AND IMAGING: >>>                          10.2   11.54 )-----------( 268      ( 06 Dec 2018 09:06 )             32.7               12-07    142  |  105  |  38<H>  ----------------------------<  125<H>  3.7   |  24  |  0.49<L>    Ca    9.3      07 Dec 2018 06:35  Phos  3.3     12-07  Mg     1.7     12-07                                 [All pertinent / recent available Imaging reports and other labs reviewed]     ---___---___---___---___---___---___ ---___---___---___---___---           <<<  A S S E S S M E N T   A N D   P L A N :  >>>          -GI/DVT Prophylaxis.    --------------------------------------------  Case discussed with   Education given on     >>______________________<<      Deniz Bolden .         phone   [phone]

## 2018-12-07 NOTE — PROGRESS NOTE ADULT - ASSESSMENT
68 year-old woman with Alzheimer's dementia seen to have volume overload and biventricular congestive heart failure.  TTE on this admission shows reduced LVEF, elevated pulmonary pressures.    Change to Lasix 40 mg via PEG every-other-day.  Keep O > I.  Keep K > 4.0 and Mag > 2.0.  Monitor BUN/Cr with daily labs until discharge.    Given severely reduced LVEF, plan to continue ACEi and uptitrate as blood pressure permits.  Can uptitrate to lisinopril 10mg daily tomorrow morning.  Will defer starting beta-blocker in this patient as she has baseline restrictive airway disease and is beta-agonist inhalers. Can reconsider low dose carvedilol as outpatient.    Case discussed with primary care physician and admitting hospitalist, Deniz Bolden MD.

## 2018-12-07 NOTE — PROGRESS NOTE ADULT - ASSESSMENT
chf  dementia   cva  depression   anxiety  asthma       will switch to lasix via peg   as per dr lujan. will continue to monitor i/o

## 2018-12-08 LAB
ALBUMIN SERPL ELPH-MCNC: 3.5 G/DL — SIGNIFICANT CHANGE UP (ref 3.3–5)
ALP SERPL-CCNC: 110 U/L — SIGNIFICANT CHANGE UP (ref 40–120)
ALT FLD-CCNC: 16 U/L — SIGNIFICANT CHANGE UP (ref 10–45)
ANION GAP SERPL CALC-SCNC: 13 MMOL/L — SIGNIFICANT CHANGE UP (ref 5–17)
AST SERPL-CCNC: 18 U/L — SIGNIFICANT CHANGE UP (ref 10–40)
BASOPHILS # BLD AUTO: 0.01 K/UL — SIGNIFICANT CHANGE UP (ref 0–0.2)
BASOPHILS NFR BLD AUTO: 0.1 % — SIGNIFICANT CHANGE UP (ref 0–2)
BILIRUB SERPL-MCNC: 0.5 MG/DL — SIGNIFICANT CHANGE UP (ref 0.2–1.2)
BUN SERPL-MCNC: 38 MG/DL — HIGH (ref 7–23)
CALCIUM SERPL-MCNC: 9.1 MG/DL — SIGNIFICANT CHANGE UP (ref 8.4–10.5)
CHLORIDE SERPL-SCNC: 106 MMOL/L — SIGNIFICANT CHANGE UP (ref 96–108)
CO2 SERPL-SCNC: 26 MMOL/L — SIGNIFICANT CHANGE UP (ref 22–31)
CREAT SERPL-MCNC: 0.47 MG/DL — LOW (ref 0.5–1.3)
EOSINOPHIL # BLD AUTO: 0.53 K/UL — HIGH (ref 0–0.5)
EOSINOPHIL NFR BLD AUTO: 5.9 % — SIGNIFICANT CHANGE UP (ref 0–6)
GLUCOSE BLDC GLUCOMTR-MCNC: 106 MG/DL — HIGH (ref 70–99)
GLUCOSE BLDC GLUCOMTR-MCNC: 125 MG/DL — HIGH (ref 70–99)
GLUCOSE BLDC GLUCOMTR-MCNC: 129 MG/DL — HIGH (ref 70–99)
GLUCOSE BLDC GLUCOMTR-MCNC: 144 MG/DL — HIGH (ref 70–99)
GLUCOSE SERPL-MCNC: 105 MG/DL — HIGH (ref 70–99)
HCT VFR BLD CALC: 33.9 % — LOW (ref 34.5–45)
HGB BLD-MCNC: 10.6 G/DL — LOW (ref 11.5–15.5)
IMM GRANULOCYTES NFR BLD AUTO: 0.2 % — SIGNIFICANT CHANGE UP (ref 0–1.5)
LYMPHOCYTES # BLD AUTO: 0.8 K/UL — LOW (ref 1–3.3)
LYMPHOCYTES # BLD AUTO: 8.8 % — LOW (ref 13–44)
MAGNESIUM SERPL-MCNC: 1.7 MG/DL — SIGNIFICANT CHANGE UP (ref 1.6–2.6)
MCHC RBC-ENTMCNC: 29.8 PG — SIGNIFICANT CHANGE UP (ref 27–34)
MCHC RBC-ENTMCNC: 31.3 GM/DL — LOW (ref 32–36)
MCV RBC AUTO: 95.2 FL — SIGNIFICANT CHANGE UP (ref 80–100)
MONOCYTES # BLD AUTO: 0.88 K/UL — SIGNIFICANT CHANGE UP (ref 0–0.9)
MONOCYTES NFR BLD AUTO: 9.7 % — SIGNIFICANT CHANGE UP (ref 2–14)
NEUTROPHILS # BLD AUTO: 6.8 K/UL — SIGNIFICANT CHANGE UP (ref 1.8–7.4)
NEUTROPHILS NFR BLD AUTO: 75.3 % — SIGNIFICANT CHANGE UP (ref 43–77)
PHOSPHATE SERPL-MCNC: 3.7 MG/DL — SIGNIFICANT CHANGE UP (ref 2.5–4.5)
PLATELET # BLD AUTO: 235 K/UL — SIGNIFICANT CHANGE UP (ref 150–400)
POTASSIUM SERPL-MCNC: 4.3 MMOL/L — SIGNIFICANT CHANGE UP (ref 3.5–5.3)
POTASSIUM SERPL-SCNC: 4.3 MMOL/L — SIGNIFICANT CHANGE UP (ref 3.5–5.3)
PROT SERPL-MCNC: 5.9 G/DL — LOW (ref 6–8.3)
RBC # BLD: 3.56 M/UL — LOW (ref 3.8–5.2)
RBC # FLD: 17.7 % — HIGH (ref 10.3–14.5)
SODIUM SERPL-SCNC: 145 MMOL/L — SIGNIFICANT CHANGE UP (ref 135–145)
WBC # BLD: 9.04 K/UL — SIGNIFICANT CHANGE UP (ref 3.8–10.5)
WBC # FLD AUTO: 9.04 K/UL — SIGNIFICANT CHANGE UP (ref 3.8–10.5)

## 2018-12-08 PROCEDURE — 71045 X-RAY EXAM CHEST 1 VIEW: CPT | Mod: 26

## 2018-12-08 PROCEDURE — 99233 SBSQ HOSP IP/OBS HIGH 50: CPT

## 2018-12-08 RX ORDER — FUROSEMIDE 40 MG
40 TABLET ORAL DAILY
Qty: 0 | Refills: 0 | Status: DISCONTINUED | OUTPATIENT
Start: 2018-12-08 | End: 2018-12-20

## 2018-12-08 RX ORDER — VANCOMYCIN HCL 1 G
1000 VIAL (EA) INTRAVENOUS ONCE
Qty: 0 | Refills: 0 | Status: COMPLETED | OUTPATIENT
Start: 2018-12-08 | End: 2018-12-09

## 2018-12-08 RX ORDER — ACETAMINOPHEN 500 MG
650 TABLET ORAL ONCE
Qty: 0 | Refills: 0 | Status: COMPLETED | OUTPATIENT
Start: 2018-12-08 | End: 2018-12-08

## 2018-12-08 RX ORDER — FUROSEMIDE 40 MG
40 TABLET ORAL DAILY
Qty: 0 | Refills: 0 | Status: DISCONTINUED | OUTPATIENT
Start: 2018-12-08 | End: 2018-12-08

## 2018-12-08 RX ADMIN — BUDESONIDE AND FORMOTEROL FUMARATE DIHYDRATE 2 PUFF(S): 160; 4.5 AEROSOL RESPIRATORY (INHALATION) at 06:20

## 2018-12-08 RX ADMIN — Medication 3 MILLILITER(S): at 22:59

## 2018-12-08 RX ADMIN — Medication 1 APPLICATION(S): at 06:41

## 2018-12-08 RX ADMIN — BUDESONIDE AND FORMOTEROL FUMARATE DIHYDRATE 2 PUFF(S): 160; 4.5 AEROSOL RESPIRATORY (INHALATION) at 17:19

## 2018-12-08 RX ADMIN — Medication 650 MILLIGRAM(S): at 23:28

## 2018-12-08 RX ADMIN — Medication 1 APPLICATION(S): at 17:15

## 2018-12-08 RX ADMIN — MIRTAZAPINE 7.5 MILLIGRAM(S): 45 TABLET, ORALLY DISINTEGRATING ORAL at 22:58

## 2018-12-08 RX ADMIN — Medication 650 MILLIGRAM(S): at 23:13

## 2018-12-08 RX ADMIN — Medication 1 APPLICATION(S): at 06:59

## 2018-12-08 RX ADMIN — GABAPENTIN 300 MILLIGRAM(S): 400 CAPSULE ORAL at 17:19

## 2018-12-08 RX ADMIN — Medication 7.5 MILLIGRAM(S): at 11:35

## 2018-12-08 RX ADMIN — Medication 1 APPLICATION(S): at 23:08

## 2018-12-08 RX ADMIN — Medication 40 MILLIGRAM(S): at 11:36

## 2018-12-08 RX ADMIN — Medication 3 MILLILITER(S): at 11:35

## 2018-12-08 RX ADMIN — QUETIAPINE FUMARATE 25 MILLIGRAM(S): 200 TABLET, FILM COATED ORAL at 22:57

## 2018-12-08 RX ADMIN — FENTANYL CITRATE 1 PATCH: 50 INJECTION INTRAVENOUS at 06:59

## 2018-12-08 RX ADMIN — SERTRALINE 50 MILLIGRAM(S): 25 TABLET, FILM COATED ORAL at 11:37

## 2018-12-08 RX ADMIN — FENTANYL CITRATE 1 PATCH: 50 INJECTION INTRAVENOUS at 19:57

## 2018-12-08 RX ADMIN — Medication 1.5 MILLIGRAM(S): at 22:58

## 2018-12-08 RX ADMIN — PANTOPRAZOLE SODIUM 40 MILLIGRAM(S): 20 TABLET, DELAYED RELEASE ORAL at 06:41

## 2018-12-08 RX ADMIN — TIZANIDINE 4 MILLIGRAM(S): 4 TABLET ORAL at 10:55

## 2018-12-08 RX ADMIN — TIOTROPIUM BROMIDE 1 CAPSULE(S): 18 CAPSULE ORAL; RESPIRATORY (INHALATION) at 11:36

## 2018-12-08 RX ADMIN — Medication 300 MILLIGRAM(S): at 11:36

## 2018-12-08 RX ADMIN — Medication 3 MILLILITER(S): at 17:20

## 2018-12-08 RX ADMIN — MINOCYCLINE HYDROCHLORIDE 100 MILLIGRAM(S): 45 TABLET, EXTENDED RELEASE ORAL at 06:40

## 2018-12-08 RX ADMIN — MINOCYCLINE HYDROCHLORIDE 100 MILLIGRAM(S): 45 TABLET, EXTENDED RELEASE ORAL at 17:19

## 2018-12-08 RX ADMIN — Medication 3 MILLILITER(S): at 06:58

## 2018-12-08 RX ADMIN — CLOPIDOGREL BISULFATE 75 MILLIGRAM(S): 75 TABLET, FILM COATED ORAL at 11:35

## 2018-12-08 RX ADMIN — TIZANIDINE 4 MILLIGRAM(S): 4 TABLET ORAL at 22:57

## 2018-12-08 RX ADMIN — LISINOPRIL 5 MILLIGRAM(S): 2.5 TABLET ORAL at 06:40

## 2018-12-08 RX ADMIN — GABAPENTIN 300 MILLIGRAM(S): 400 CAPSULE ORAL at 06:41

## 2018-12-08 NOTE — PROGRESS NOTE ADULT - SUBJECTIVE AND OBJECTIVE BOX
---___---___---___---___---___---___ ---___---___---___---___---___---___---___---___---___---                    <<<  M E D I C A L   A T T E N D I N G    F O L L O W    U P   N O T E  >>>    - Patient seen and examined by me approximately thirty minutes ago.  - In summary, patient is a 68y year old Female who origianlly presented with edema d elevate bnp  - Patient today overall doing ok, comfortable, eating OK.     ---___---___---___---___---___---      <<<  MEDICATIONS:  >>>    MEDICATIONS  (STANDING):  ALBUTerol/ipratropium for Nebulization 3 milliLiter(s) Nebulizer every 6 hours  AQUAPHOR (petrolatum Ointment) 1 Application(s) Topical three times a day  buDESOnide 160 MICROgram(s)/formoterol 4.5 MICROgram(s) Inhaler 2 Puff(s) Inhalation two times a day  clobetasol 0.05% Ointment 1 Application(s) Topical two times a day  clonazePAM Tablet 1.5 milliGRAM(s) Oral at bedtime  clopidogrel Tablet 75 milliGRAM(s) Oral daily  dextrose 5%. 1000 milliLiter(s) (50 mL/Hr) IV Continuous <Continuous>  dextrose 50% Injectable 12.5 Gram(s) IV Push once  dextrose 50% Injectable 25 Gram(s) IV Push once  dextrose 50% Injectable 25 Gram(s) IV Push once  fentaNYL   Patch  12 MICROgram(s)/Hr 1 Patch Transdermal every 72 hours  ferrous    sulfate Liquid 300 milliGRAM(s) Enteral Tube daily  furosemide    Tablet 40 milliGRAM(s) Oral daily  gabapentin   Solution 300 milliGRAM(s) Oral two times a day  insulin lispro (HumaLOG) corrective regimen sliding scale   SubCutaneous every 6 hours  lisinopril 5 milliGRAM(s) Oral daily  medical marijuana oil 1 milliLiter(s) 1 milliLiter(s) SubLingual <User Schedule>  minocycline 100 milliGRAM(s) Oral two times a day  mirtazapine 7.5 milliGRAM(s) Oral at bedtime  pantoprazole   Suspension 40 milliGRAM(s) Oral before breakfast  predniSONE   Tablet 7.5 milliGRAM(s) Oral <User Schedule>  QUEtiapine 25 milliGRAM(s) Oral at bedtime  sertraline 50 milliGRAM(s) Oral daily  tiotropium 18 MICROgram(s) Capsule 1 Capsule(s) Inhalation daily  tiZANidine 4 milliGRAM(s) Oral <User Schedule>      MEDICATIONS  (PRN):  acetaminophen    Suspension .. 680 milliGRAM(s) Oral every 6 hours PRN Temp greater or equal to 38C (100.4F), Mild Pain (1 - 3)  dextrose 40% Gel 15 Gram(s) Oral once PRN Blood Glucose LESS THAN 70 milliGRAM(s)/deciliter  glucagon  Injectable 1 milliGRAM(s) IntraMuscular once PRN Glucose LESS THAN 70 milligrams/deciliter  nystatin Powder 1 Application(s) Topical two times a day PRN rash/itchiness       ---___---___---___---___---___---     <<<REVIEW OF SYSTEM: >>>    GEN: no fever, no chills, no pain  RESP: no SOB, no cough, no sputum  CVS: no chest pain, no palpitations, no edema  GI: no abdominal pain, no nausea, no vomiting, no constipation, no diarrhea  : no dysurea, no frequency  NEURO: no headache, no dizziness  PSYCH: no depression, not anxious  Derm : no itching, no rash     ---___---___---___---___---___---          <<<  VITAL SIGNS: >>>    T(F): 97.7 (12-08-18 @ 11:43), Max: 97.9 (12-08-18 @ 03:55)  HR: 93 (12-08-18 @ 11:43) (86 - 106)  BP: 127/68 (12-08-18 @ 11:43) (120/76 - 154/89)  RR: 18 (12-08-18 @ 11:43) (18 - 18)  SpO2: 93% (12-08-18 @ 11:43) (93% - 97%)  Wt(kg): --  CAPILLARY BLOOD GLUCOSE  143 (06 Dec 2018 23:45)      POCT Blood Glucose.: 144 mg/dL (08 Dec 2018 17:13)    I&O's Summary    07 Dec 2018 07:01  -  08 Dec 2018 07:00  --------------------------------------------------------  IN: 600 mL / OUT: 1050 mL / NET: -450 mL    08 Dec 2018 07:01  -  08 Dec 2018 18:25  --------------------------------------------------------  IN: 0 mL / OUT: 750 mL / NET: -750 mL         ---___---___---___---___---___---                       PHYSICAL EXAM:    GEN: A&O X 3 , NAD , comfortable  HEENT: NCAT, PERRL, MMM, no scleral icterus, hearing intact  NECK: Supple, No JVD  CVS: S1S2 , regular , No M/R/G appreciated  PULM: CTA B/L,  no W/R/R appreciated  ABD.: soft. non tender, non distended,  bowel sounds present peg noted   Extrem: intact pulses , no edema noted  Derm: sacral decubitus noted  un stageable   PSYCH: normal mood, no depression, not anxious     ---___---___---___---___---___---     <<<  LAB AND IMAGING: >>>                          10.6   9.04  )-----------( 235      ( 08 Dec 2018 08:00 )             33.9               12-08    145  |  106  |  38<H>  ----------------------------<  105<H>  4.3   |  26  |  0.47<L>    Ca    9.1      08 Dec 2018 06:24  Phos  3.7     12-08  Mg     1.7     12-08    TPro  5.9<L>  /  Alb  3.5  /  TBili  0.5  /  DBili  x   /  AST  18  /  ALT  16  /  AlkPhos  110  12-08                               [All pertinent / recent available Imaging reports and other labs reviewed]     ---___---___---___---___---___---___ ---___---___---___---___---           <<<  A S S E S S M E N T   A N D   P L A N :  >>>          -GI/DVT Prophylaxis.    --------------------------------------------       >>______________________<<      Deniz Bolden .         phone   [phone]

## 2018-12-08 NOTE — PROGRESS NOTE ADULT - ASSESSMENT
chf  anxiety   ibs Ulecerative colitis   impaird glucose tolerance   asthma   dementia   sacral ulcer  continue current care

## 2018-12-08 NOTE — PROGRESS NOTE ADULT - SUBJECTIVE AND OBJECTIVE BOX
Patient currently sleeping but according to  who was in the room, she is back to her baseline.  No overt evidence of fluid overload.                    Vital Signs Last 24 Hrs  T(C): 36.6 (08 Dec 2018 03:55), Max: 36.7 (07 Dec 2018 17:06)  T(F): 97.9 (08 Dec 2018 03:55), Max: 98 (07 Dec 2018 17:06)  HR: 86 (08 Dec 2018 03:55) (81 - 106)  BP: 120/76 (08 Dec 2018 03:55) (96/53 - 154/89)  BP(mean): --  RR: 18 (08 Dec 2018 03:55) (18 - 18)  SpO2: 95% (08 Dec 2018 03:55) (95% - 97%)  Daily     Daily   I&O's Summary    07 Dec 2018 07:01  -  08 Dec 2018 07:00  --------------------------------------------------------  IN: 600 mL / OUT: 1050 mL / NET: -450 mL        Neck: no bruits, JVD, adenopathy  Chest: clear  Cor: [id number], RR, normal S1S2 with no mrght  Abd: soft, nontender, BS+ and no HSM  Ext: w/o CCE  Pulses:2+->dp bilaterally    MEDICATIONS  (STANDING):  ALBUTerol/ipratropium for Nebulization 3 milliLiter(s) Nebulizer every 6 hours  AQUAPHOR (petrolatum Ointment) 1 Application(s) Topical three times a day  buDESOnide 160 MICROgram(s)/formoterol 4.5 MICROgram(s) Inhaler 2 Puff(s) Inhalation two times a day  clobetasol 0.05% Ointment 1 Application(s) Topical two times a day  clonazePAM Tablet 1.5 milliGRAM(s) Oral at bedtime  clopidogrel Tablet 75 milliGRAM(s) Oral daily  fentaNYL   Patch  12 MICROgram(s)/Hr 1 Patch Transdermal every 72 hours  ferrous    sulfate Liquid 300 milliGRAM(s) Enteral Tube daily  gabapentin   Solution 300 milliGRAM(s) Oral two times a day  insulin lispro (HumaLOG) corrective regimen sliding scale   SubCutaneous every 6 hours  lisinopril 5 milliGRAM(s) Oral daily  medical marijuana oil 1 milliLiter(s) 1 milliLiter(s) SubLingual <User Schedule>  minocycline 100 milliGRAM(s) Oral two times a day  mirtazapine 7.5 milliGRAM(s) Oral at bedtime  pantoprazole   Suspension 40 milliGRAM(s) Oral before breakfast  predniSONE   Tablet 7.5 milliGRAM(s) Oral <User Schedule>  QUEtiapine 25 milliGRAM(s) Oral at bedtime  sertraline 50 milliGRAM(s) Oral daily  tiotropium 18 MICROgram(s) Capsule 1 Capsule(s) Inhalation daily  tiZANidine 4 milliGRAM(s) Oral <User Schedule>    MEDICATIONS  (PRN):  acetaminophen    Suspension .. 680 milliGRAM(s) Oral every 6 hours PRN Temp greater or equal to 38C (100.4F), Mild Pain (1 - 3)  dextrose 40% Gel 15 Gram(s) Oral once PRN Blood Glucose LESS THAN 70 milliGRAM(s)/deciliter  glucagon  Injectable 1 milliGRAM(s) IntraMuscular once PRN Glucose LESS THAN 70 milligrams/deciliter  nystatin Powder 1 Application(s) Topical two times a day PRN rash/itchiness      12-08    145  |  106  |  38<H>  ----------------------------<  105<H>  4.3   |  26  |  0.47<L>    Ca    9.1      08 Dec 2018 06:24  Phos  3.7     12-08  Mg     1.7     12-08    TPro  5.9<L>  /  Alb  3.5  /  TBili  0.5  /  DBili  x   /  AST  18  /  ALT  16  /  AlkPhos  110  12-08                          10.6   9.04  )-----------( 235      ( 08 Dec 2018 08:00 )             33.9         HEALTH ISSUES - PROBLEM Dx:  Ulcerative colitis: Ulcerative colitis  Anxiety: Anxiety  Moderate episode of recurrent major depressive disorder: Moderate episode of recurrent major depressive disorder  CVA (cerebral vascular accident): CVA (cerebral vascular accident)  IGT (impaired glucose tolerance): IGT (impaired glucose tolerance)  Suspected deep vein thrombosis  Suspected pulmonary embolism  Asthma: Asthma  Dysphagia, unspecified type: Dysphagia, unspecified type  Functional quadriplegia: Functional quadriplegia  Generalized edema: Generalized edema Patient currently sleeping but according to  who was in the room, she is back to her baseline.  No overt evidence of fluid overload.  IV Lasix switched to po today.  Consider plan for discharge home tomorrow.      Vital Signs Last 24 Hrs  T(C): 36.6 (08 Dec 2018 03:55), Max: 36.7 (07 Dec 2018 17:06)  T(F): 97.9 (08 Dec 2018 03:55), Max: 98 (07 Dec 2018 17:06)  HR: 86 (08 Dec 2018 03:55) (81 - 106)  BP: 120/76 (08 Dec 2018 03:55) (96/53 - 154/89)  BP(mean): --  RR: 18 (08 Dec 2018 03:55) (18 - 18)  SpO2: 95% (08 Dec 2018 03:55) (95% - 97%)  Daily     Daily   I&O's Summary    07 Dec 2018 07:01  -  08 Dec 2018 07:00  --------------------------------------------------------  IN: 600 mL / OUT: 1050 mL / NET: -450 mL        Neck: no bruits, JVD, adenopathy  Chest: clear  Cor: [id number], RR, normal S1S2 with no mrght  Abd: soft, nontender, BS+ and no HSM  Ext: w/o CCE  Pulses:2+->dp bilaterally    MEDICATIONS  (STANDING):  ALBUTerol/ipratropium for Nebulization 3 milliLiter(s) Nebulizer every 6 hours  AQUAPHOR (petrolatum Ointment) 1 Application(s) Topical three times a day  buDESOnide 160 MICROgram(s)/formoterol 4.5 MICROgram(s) Inhaler 2 Puff(s) Inhalation two times a day  clobetasol 0.05% Ointment 1 Application(s) Topical two times a day  clonazePAM Tablet 1.5 milliGRAM(s) Oral at bedtime  clopidogrel Tablet 75 milliGRAM(s) Oral daily  fentaNYL   Patch  12 MICROgram(s)/Hr 1 Patch Transdermal every 72 hours  ferrous    sulfate Liquid 300 milliGRAM(s) Enteral Tube daily  furosemide 40 mg. enteral tube daily   gabapentin   Solution 300 milliGRAM(s) Oral two times a day  insulin lispro (HumaLOG) corrective regimen sliding scale   SubCutaneous every 6 hours  lisinopril 5 milliGRAM(s) Oral daily  medical marijuana oil 1 milliLiter(s) 1 milliLiter(s) SubLingual <User Schedule>  minocycline 100 milliGRAM(s) Oral two times a day  mirtazapine 7.5 milliGRAM(s) Oral at bedtime  pantoprazole   Suspension 40 milliGRAM(s) Oral before breakfast  predniSONE   Tablet 7.5 milliGRAM(s) Oral <User Schedule>  QUEtiapine 25 milliGRAM(s) Oral at bedtime  sertraline 50 milliGRAM(s) Oral daily  tiotropium 18 MICROgram(s) Capsule 1 Capsule(s) Inhalation daily  tiZANidine 4 milliGRAM(s) Oral <User Schedule>    MEDICATIONS  (PRN):  acetaminophen    Suspension .. 680 milliGRAM(s) Oral every 6 hours PRN Temp greater or equal to 38C (100.4F), Mild Pain (1 - 3)  dextrose 40% Gel 15 Gram(s) Oral once PRN Blood Glucose LESS THAN 70 milliGRAM(s)/deciliter  glucagon  Injectable 1 milliGRAM(s) IntraMuscular once PRN Glucose LESS THAN 70 milligrams/deciliter  nystatin Powder 1 Application(s) Topical two times a day PRN rash/itchiness      12-08    145  |  106  |  38<H>  ----------------------------<  105<H>  4.3   |  26  |  0.47<L>    Ca    9.1      08 Dec 2018 06:24  Phos  3.7     12-08  Mg     1.7     12-08    TPro  5.9<L>  /  Alb  3.5  /  TBili  0.5  /  DBili  x   /  AST  18  /  ALT  16  /  AlkPhos  110  12-08                          10.6   9.04  )-----------( 235      ( 08 Dec 2018 08:00 )             33.9         HEALTH ISSUES - PROBLEM Dx:  Ulcerative colitis: Ulcerative colitis  Anxiety: Anxiety  Moderate episode of recurrent major depressive disorder: Moderate episode of recurrent major depressive disorder  CVA (cerebral vascular accident): CVA (cerebral vascular accident)  IGT (impaired glucose tolerance): IGT (impaired glucose tolerance)  Suspected deep vein thrombosis  Suspected pulmonary embolism  Asthma: Asthma  Dysphagia, unspecified type: Dysphagia, unspecified type  Functional quadriplegia: Functional quadriplegia  Generalized edema: Generalized edema

## 2018-12-08 NOTE — CHART NOTE - NSCHARTNOTEFT_GEN_A_CORE
Called by RN that, pt noted to have Temp of 101.2F [Rectally] at 2301.     PAST MEDICAL & SURGICAL HISTORY:  CVA (cerebral vascular accident)  Fatty pancreas  PNA (pneumonia)  Pulmonary HTN  IGT (impaired glucose tolerance)  Ulcerative colitis  Acid reflux  Anxiety  Depression  Mouth sores  HLD (hyperlipidemia)  Asthma  Humeral head fracture  H/O: hysterectomy  H/O cataract extraction, left  History of knee replacement    MEDICATIONS  (STANDING):  ALBUTerol/ipratropium for Nebulization 3 milliLiter(s) Nebulizer every 6 hours  AQUAPHOR (petrolatum Ointment) 1 Application(s) Topical three times a day  buDESOnide 160 MICROgram(s)/formoterol 4.5 MICROgram(s) Inhaler 2 Puff(s) Inhalation two times a day  clobetasol 0.05% Ointment 1 Application(s) Topical two times a day  clonazePAM Tablet 1.5 milliGRAM(s) Oral at bedtime  clopidogrel Tablet 75 milliGRAM(s) Oral daily  dextrose 5%. 1000 milliLiter(s) (50 mL/Hr) IV Continuous <Continuous>  dextrose 50% Injectable 12.5 Gram(s) IV Push once  dextrose 50% Injectable 25 Gram(s) IV Push once  dextrose 50% Injectable 25 Gram(s) IV Push once  fentaNYL   Patch  12 MICROgram(s)/Hr 1 Patch Transdermal every 72 hours  ferrous    sulfate Liquid 300 milliGRAM(s) Enteral Tube daily  furosemide    Tablet 40 milliGRAM(s) Oral daily  gabapentin   Solution 300 milliGRAM(s) Oral two times a day  insulin lispro (HumaLOG) corrective regimen sliding scale   SubCutaneous every 6 hours  lisinopril 5 milliGRAM(s) Oral daily  medical marijuana oil 1 milliLiter(s) 1 milliLiter(s) SubLingual <User Schedule>  minocycline 100 milliGRAM(s) Oral two times a day  mirtazapine 7.5 milliGRAM(s) Oral at bedtime  pantoprazole   Suspension 40 milliGRAM(s) Oral before breakfast  predniSONE   Tablet 7.5 milliGRAM(s) Oral <User Schedule>  QUEtiapine 25 milliGRAM(s) Oral at bedtime  sertraline 50 milliGRAM(s) Oral daily  tiotropium 18 MICROgram(s) Capsule 1 Capsule(s) Inhalation daily  tiZANidine 4 milliGRAM(s) Oral <User Schedule>    MEDICATIONS  (PRN):  acetaminophen    Suspension .. 680 milliGRAM(s) Oral every 6 hours PRN Temp greater or equal to 38C (100.4F), Mild Pain (1 - 3)  dextrose 40% Gel 15 Gram(s) Oral once PRN Blood Glucose LESS THAN 70 milliGRAM(s)/deciliter  glucagon  Injectable 1 milliGRAM(s) IntraMuscular once PRN Glucose LESS THAN 70 milligrams/deciliter  nystatin Powder 1 Application(s) Topical two times a day PRN rash/itchiness    Vital Signs Last 24 Hrs  T(C): 38.4 (08 Dec 2018 23:01), Max: 38.4 (08 Dec 2018 23:01)  T(F): 101.2 (08 Dec 2018 23:01), Max: 101.2 (08 Dec 2018 23:01)  HR: 105 (08 Dec 2018 20:53) (86 - 105)  BP: 156/81 (08 Dec 2018 20:53) (120/76 - 156/81)  BP(mean): --  RR: 18 (08 Dec 2018 20:53) (18 - 18)  SpO2: 98% (08 Dec 2018 20:53) (93% - 98%)                          10.6   9.04  )-----------( 235      ( 08 Dec 2018 08:00 )             33.9     12-08    145  |  106  |  38<H>  ----------------------------<  105<H>  4.3   |  26  |  0.47<L>    Ca    9.1      08 Dec 2018 06:24  Phos  3.7     12-08  Mg     1.7     12-08    TPro  5.9<L>  /  Alb  3.5  /  TBili  0.5  /  DBili  x   /  AST  18  /  ALT  16  /  AlkPhos  110  12-08    A/P: 68F with advanced dementia (nonverbal, dysphagia with PEG tube, bedbound- functional quadriplegia, chronic gavin catheter in place), sacral pressure ulcer stage 3, heel pressure ulcers, asthma on prednisone, HLD, CVA, UC, 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  Now noted to have Temp of 101.2F.     # Sepsis 2/2 Bacteremia/ UTI.   - WBC: 9.04.   - f/u Lactate, Procalcitonin in am.   - UA [12/2]: Large LE, Large Nitrites, 150 WBC.   - UCX [12/2]: Pseudomonas Aeruginosa.   - BCX [12/2]: GPC, Coag Neg Staph.   - f/u UA, UCX, BCX SENT.   - CXR STAT:   - IV Vancomycin 1gm, x1.   - c/w minocycline 100 milliGRAM(s) Oral two times a day  - Tylenol prn.   - Cooling Measures.   - f/u ID recs.   - Will endorse to primary team in am.     DOROTHY. CHRISTINE RUEDA,   # [id number]  Medicine PA. Called by RN that, pt noted to have Temp of 101.2F [Rectally] at 2301.     PAST MEDICAL & SURGICAL HISTORY:  CVA (cerebral vascular accident)  Fatty pancreas  PNA (pneumonia)  Pulmonary HTN  IGT (impaired glucose tolerance)  Ulcerative colitis  Acid reflux  Anxiety  Depression  Mouth sores  HLD (hyperlipidemia)  Asthma  Humeral head fracture  H/O: hysterectomy  H/O cataract extraction, left  History of knee replacement    MEDICATIONS  (STANDING):  ALBUTerol/ipratropium for Nebulization 3 milliLiter(s) Nebulizer every 6 hours  AQUAPHOR (petrolatum Ointment) 1 Application(s) Topical three times a day  buDESOnide 160 MICROgram(s)/formoterol 4.5 MICROgram(s) Inhaler 2 Puff(s) Inhalation two times a day  clobetasol 0.05% Ointment 1 Application(s) Topical two times a day  clonazePAM Tablet 1.5 milliGRAM(s) Oral at bedtime  clopidogrel Tablet 75 milliGRAM(s) Oral daily  dextrose 5%. 1000 milliLiter(s) (50 mL/Hr) IV Continuous <Continuous>  dextrose 50% Injectable 12.5 Gram(s) IV Push once  dextrose 50% Injectable 25 Gram(s) IV Push once  dextrose 50% Injectable 25 Gram(s) IV Push once  fentaNYL   Patch  12 MICROgram(s)/Hr 1 Patch Transdermal every 72 hours  ferrous    sulfate Liquid 300 milliGRAM(s) Enteral Tube daily  furosemide    Tablet 40 milliGRAM(s) Oral daily  gabapentin   Solution 300 milliGRAM(s) Oral two times a day  insulin lispro (HumaLOG) corrective regimen sliding scale   SubCutaneous every 6 hours  lisinopril 5 milliGRAM(s) Oral daily  medical marijuana oil 1 milliLiter(s) 1 milliLiter(s) SubLingual <User Schedule>  minocycline 100 milliGRAM(s) Oral two times a day  mirtazapine 7.5 milliGRAM(s) Oral at bedtime  pantoprazole   Suspension 40 milliGRAM(s) Oral before breakfast  predniSONE   Tablet 7.5 milliGRAM(s) Oral <User Schedule>  QUEtiapine 25 milliGRAM(s) Oral at bedtime  sertraline 50 milliGRAM(s) Oral daily  tiotropium 18 MICROgram(s) Capsule 1 Capsule(s) Inhalation daily  tiZANidine 4 milliGRAM(s) Oral <User Schedule>    MEDICATIONS  (PRN):  acetaminophen    Suspension .. 680 milliGRAM(s) Oral every 6 hours PRN Temp greater or equal to 38C (100.4F), Mild Pain (1 - 3)  dextrose 40% Gel 15 Gram(s) Oral once PRN Blood Glucose LESS THAN 70 milliGRAM(s)/deciliter  glucagon  Injectable 1 milliGRAM(s) IntraMuscular once PRN Glucose LESS THAN 70 milligrams/deciliter  nystatin Powder 1 Application(s) Topical two times a day PRN rash/itchiness    Vital Signs Last 24 Hrs  T(C): 38.4 (08 Dec 2018 23:01), Max: 38.4 (08 Dec 2018 23:01)  T(F): 101.2 (08 Dec 2018 23:01), Max: 101.2 (08 Dec 2018 23:01)  HR: 105 (08 Dec 2018 20:53) (86 - 105)  BP: 156/81 (08 Dec 2018 20:53) (120/76 - 156/81)  BP(mean): --  RR: 18 (08 Dec 2018 20:53) (18 - 18)  SpO2: 98% (08 Dec 2018 20:53) (93% - 98%)                          10.6   9.04  )-----------( 235      ( 08 Dec 2018 08:00 )             33.9     12-08    145  |  106  |  38<H>  ----------------------------<  105<H>  4.3   |  26  |  0.47<L>    Ca    9.1      08 Dec 2018 06:24  Phos  3.7     12-08  Mg     1.7     12-08    TPro  5.9<L>  /  Alb  3.5  /  TBili  0.5  /  DBili  x   /  AST  18  /  ALT  16  /  AlkPhos  110  12-08    A/P: 68F with advanced dementia (nonverbal, dysphagia with PEG tube, bedbound- functional quadriplegia, chronic gavin catheter in place), sacral pressure ulcer stage 3, heel pressure ulcers, asthma on prednisone, HLD, CVA, UC, 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  Now noted to have Temp of 101.2F.     # Sepsis 2/2 Bacteremia/ UTI.   - WBC: 9.04.   - f/u Lactate, Procalcitonin in am.   - UA [12/2]: Large LE, Large Nitrites, 150 WBC.   - UCX [12/2]: Pseudomonas Aeruginosa.   - BCX [12/2]: GPC, Coag Neg Staph.   - f/u UA, UCX, BCX SENT.   - CXR STAT:   - c/w minocycline 100 milliGRAM(s) Oral two times a day  - Tylenol prn.   - Cooling Measures.   - f/u ID recs.   - Will endorse to primary team in am.     DOROTHY. CHRISTINE RUEDA,   # [id number]  Medicine PA. Called by RN that, pt noted to have Temp of 101.2F [Rectally] at 2301.     PAST MEDICAL & SURGICAL HISTORY:  CVA (cerebral vascular accident)  Fatty pancreas  PNA (pneumonia)  Pulmonary HTN  IGT (impaired glucose tolerance)  Ulcerative colitis  Acid reflux  Anxiety  Depression  Mouth sores  HLD (hyperlipidemia)  Asthma  Humeral head fracture  H/O: hysterectomy  H/O cataract extraction, left  History of knee replacement    MEDICATIONS  (STANDING):  ALBUTerol/ipratropium for Nebulization 3 milliLiter(s) Nebulizer every 6 hours  AQUAPHOR (petrolatum Ointment) 1 Application(s) Topical three times a day  buDESOnide 160 MICROgram(s)/formoterol 4.5 MICROgram(s) Inhaler 2 Puff(s) Inhalation two times a day  clobetasol 0.05% Ointment 1 Application(s) Topical two times a day  clonazePAM Tablet 1.5 milliGRAM(s) Oral at bedtime  clopidogrel Tablet 75 milliGRAM(s) Oral daily  dextrose 5%. 1000 milliLiter(s) (50 mL/Hr) IV Continuous <Continuous>  dextrose 50% Injectable 12.5 Gram(s) IV Push once  dextrose 50% Injectable 25 Gram(s) IV Push once  dextrose 50% Injectable 25 Gram(s) IV Push once  fentaNYL   Patch  12 MICROgram(s)/Hr 1 Patch Transdermal every 72 hours  ferrous    sulfate Liquid 300 milliGRAM(s) Enteral Tube daily  furosemide    Tablet 40 milliGRAM(s) Oral daily  gabapentin   Solution 300 milliGRAM(s) Oral two times a day  insulin lispro (HumaLOG) corrective regimen sliding scale   SubCutaneous every 6 hours  lisinopril 5 milliGRAM(s) Oral daily  medical marijuana oil 1 milliLiter(s) 1 milliLiter(s) SubLingual <User Schedule>  minocycline 100 milliGRAM(s) Oral two times a day  mirtazapine 7.5 milliGRAM(s) Oral at bedtime  pantoprazole   Suspension 40 milliGRAM(s) Oral before breakfast  predniSONE   Tablet 7.5 milliGRAM(s) Oral <User Schedule>  QUEtiapine 25 milliGRAM(s) Oral at bedtime  sertraline 50 milliGRAM(s) Oral daily  tiotropium 18 MICROgram(s) Capsule 1 Capsule(s) Inhalation daily  tiZANidine 4 milliGRAM(s) Oral <User Schedule>    MEDICATIONS  (PRN):  acetaminophen    Suspension .. 680 milliGRAM(s) Oral every 6 hours PRN Temp greater or equal to 38C (100.4F), Mild Pain (1 - 3)  dextrose 40% Gel 15 Gram(s) Oral once PRN Blood Glucose LESS THAN 70 milliGRAM(s)/deciliter  glucagon  Injectable 1 milliGRAM(s) IntraMuscular once PRN Glucose LESS THAN 70 milligrams/deciliter  nystatin Powder 1 Application(s) Topical two times a day PRN rash/itchiness    Vital Signs Last 24 Hrs  T(C): 38.4 (08 Dec 2018 23:01), Max: 38.4 (08 Dec 2018 23:01)  T(F): 101.2 (08 Dec 2018 23:01), Max: 101.2 (08 Dec 2018 23:01)  HR: 105 (08 Dec 2018 20:53) (86 - 105)  BP: 156/81 (08 Dec 2018 20:53) (120/76 - 156/81)  BP(mean): --  RR: 18 (08 Dec 2018 20:53) (18 - 18)  SpO2: 98% (08 Dec 2018 20:53) (93% - 98%)                          10.6   9.04  )-----------( 235      ( 08 Dec 2018 08:00 )             33.9     12-08    145  |  106  |  38<H>  ----------------------------<  105<H>  4.3   |  26  |  0.47<L>    Ca    9.1      08 Dec 2018 06:24  Phos  3.7     12-08  Mg     1.7     12-08    TPro  5.9<L>  /  Alb  3.5  /  TBili  0.5  /  DBili  x   /  AST  18  /  ALT  16  /  AlkPhos  110  12-08    A/P: 68F with advanced dementia (nonverbal, dysphagia with PEG tube, bedbound- functional quadriplegia, chronic gavin catheter in place), sacral pressure ulcer stage 3, heel pressure ulcers, asthma on prednisone, HLD, CVA, UC, 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  Now noted to have Temp of 101.2F.     # Sepsis 2/2 Bacteremia/ UTI.   - WBC: 9.04.   - f/u Lactate, Procalcitonin in am.   - UA [12/2]: Large LE, Large Nitrites, 150 WBC.   - UCX [12/2]: Pseudomonas Aeruginosa.   - BCX [12/2]: GPC, Coag Neg Staph.   - f/u UA, UCX, BCX SENT.   - CXR STAT: LLL PNA vs pleural effusion, f/u official reading.   - Pt allergic to Vancomycin.   - c/w minocycline 100 milliGRAM(s) Oral two times a day  - Tylenol prn.   - Cooling Measures.   - Spoke to Dr. Guaman and will get ID consult in am.   - f/u ID recs.   - Will endorse to primary team in amPreston DE LA CRUZ. CHRISTINE RUEDA,   # [id number]  Medicine PA. Called by RN that, pt noted to have Temp of 101.2F [Rectally] at 2301. Pt was evaluated at bedside. Denies CP/ SOB/ Palpitations/ Daiphoresis, HA/Dizziness/ Blurry vision/ syncope, Abdominal Pain/N/V/D/C.     PAST MEDICAL & SURGICAL HISTORY:  CVA (cerebral vascular accident)  Fatty pancreas  PNA (pneumonia)  Pulmonary HTN  IGT (impaired glucose tolerance)  Ulcerative colitis  Acid reflux  Anxiety  Depression  Mouth sores  HLD (hyperlipidemia)  Asthma  Humeral head fracture  H/O: hysterectomy  H/O cataract extraction, left  History of knee replacement    MEDICATIONS  (STANDING):  ALBUTerol/ipratropium for Nebulization 3 milliLiter(s) Nebulizer every 6 hours  AQUAPHOR (petrolatum Ointment) 1 Application(s) Topical three times a day  buDESOnide 160 MICROgram(s)/formoterol 4.5 MICROgram(s) Inhaler 2 Puff(s) Inhalation two times a day  clobetasol 0.05% Ointment 1 Application(s) Topical two times a day  clonazePAM Tablet 1.5 milliGRAM(s) Oral at bedtime  clopidogrel Tablet 75 milliGRAM(s) Oral daily  dextrose 5%. 1000 milliLiter(s) (50 mL/Hr) IV Continuous <Continuous>  dextrose 50% Injectable 12.5 Gram(s) IV Push once  dextrose 50% Injectable 25 Gram(s) IV Push once  dextrose 50% Injectable 25 Gram(s) IV Push once  fentaNYL   Patch  12 MICROgram(s)/Hr 1 Patch Transdermal every 72 hours  ferrous    sulfate Liquid 300 milliGRAM(s) Enteral Tube daily  furosemide    Tablet 40 milliGRAM(s) Oral daily  gabapentin   Solution 300 milliGRAM(s) Oral two times a day  insulin lispro (HumaLOG) corrective regimen sliding scale   SubCutaneous every 6 hours  lisinopril 5 milliGRAM(s) Oral daily  medical marijuana oil 1 milliLiter(s) 1 milliLiter(s) SubLingual <User Schedule>  minocycline 100 milliGRAM(s) Oral two times a day  mirtazapine 7.5 milliGRAM(s) Oral at bedtime  pantoprazole   Suspension 40 milliGRAM(s) Oral before breakfast  predniSONE   Tablet 7.5 milliGRAM(s) Oral <User Schedule>  QUEtiapine 25 milliGRAM(s) Oral at bedtime  sertraline 50 milliGRAM(s) Oral daily  tiotropium 18 MICROgram(s) Capsule 1 Capsule(s) Inhalation daily  tiZANidine 4 milliGRAM(s) Oral <User Schedule>    MEDICATIONS  (PRN):  acetaminophen    Suspension .. 680 milliGRAM(s) Oral every 6 hours PRN Temp greater or equal to 38C (100.4F), Mild Pain (1 - 3)  dextrose 40% Gel 15 Gram(s) Oral once PRN Blood Glucose LESS THAN 70 milliGRAM(s)/deciliter  glucagon  Injectable 1 milliGRAM(s) IntraMuscular once PRN Glucose LESS THAN 70 milligrams/deciliter  nystatin Powder 1 Application(s) Topical two times a day PRN rash/itchiness    Vital Signs Last 24 Hrs  T(C): 38.4 (08 Dec 2018 23:01), Max: 38.4 (08 Dec 2018 23:01)  T(F): 101.2 (08 Dec 2018 23:01), Max: 101.2 (08 Dec 2018 23:01)  HR: 105 (08 Dec 2018 20:53) (86 - 105)  BP: 156/81 (08 Dec 2018 20:53) (120/76 - 156/81)  BP(mean): --  RR: 18 (08 Dec 2018 20:53) (18 - 18)  SpO2: 98% (08 Dec 2018 20:53) (93% - 98%)                          10.6   9.04  )-----------( 235      ( 08 Dec 2018 08:00 )             33.9     12-08    145  |  106  |  38<H>  ----------------------------<  105<H>  4.3   |  26  |  0.47<L>    Ca    9.1      08 Dec 2018 06:24  Phos  3.7     12-08  Mg     1.7     12-08    TPro  5.9<L>  /  Alb  3.5  /  TBili  0.5  /  DBili  x   /  AST  18  /  ALT  16  /  AlkPhos  110  12-08    A/P: 68F with advanced dementia (nonverbal, dysphagia with PEG tube, bedbound- functional quadriplegia, chronic gavin catheter in place), sacral pressure ulcer stage 3, heel pressure ulcers, asthma on prednisone, HLD, CVA, UC, 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  Now noted to have Temp of 101.2F.     # Sepsis 2/2 Bacteremia/ UTI.   - WBC: 9.04.   - f/u Lactate, Procalcitonin in am.   - UA [12/2]: Large LE, Large Nitrites, 150 WBC.   - UCX [12/2]: Pseudomonas Aeruginosa.   - BCX [12/2]: GPC, Coag Neg Staph.   - f/u UA, UCX, BCX SENT.   - CXR STAT: LLL PNA vs pleural effusion, f/u official reading.   - Vancomycin ordered to cover MRSA and GPC in blood in the past. But as per d/w daughter and  at bedside, pt found janee allergic to Vancomycin. Pt develops rashes around neck and chest area. Vancomycin discontinued. Added as a allergen in the pt's chart.   - c/w minocycline 100 milliGRAM(s) Oral two times a day  - Tylenol prn.   - Cooling Measures.   - Spoke to Dr. Guaman and will get ID consult in am.   - f/u ID recs.   - Will endorse to primary team in am.     DOROTHY. CHRISTINE RUEDA,   # [id number]  Medicine PA.

## 2018-12-09 LAB
ANION GAP SERPL CALC-SCNC: 17 MMOL/L — SIGNIFICANT CHANGE UP (ref 5–17)
APPEARANCE UR: ABNORMAL
BILIRUB UR-MCNC: NEGATIVE — SIGNIFICANT CHANGE UP
BUN SERPL-MCNC: 34 MG/DL — HIGH (ref 7–23)
CALCIUM SERPL-MCNC: 8.9 MG/DL — SIGNIFICANT CHANGE UP (ref 8.4–10.5)
CHLORIDE SERPL-SCNC: 103 MMOL/L — SIGNIFICANT CHANGE UP (ref 96–108)
CO2 SERPL-SCNC: 24 MMOL/L — SIGNIFICANT CHANGE UP (ref 22–31)
COLOR SPEC: YELLOW — SIGNIFICANT CHANGE UP
CREAT SERPL-MCNC: 0.49 MG/DL — LOW (ref 0.5–1.3)
CULTURE RESULTS: SIGNIFICANT CHANGE UP
CULTURE RESULTS: SIGNIFICANT CHANGE UP
DIFF PNL FLD: ABNORMAL
GLUCOSE BLDC GLUCOMTR-MCNC: 106 MG/DL — HIGH (ref 70–99)
GLUCOSE BLDC GLUCOMTR-MCNC: 134 MG/DL — HIGH (ref 70–99)
GLUCOSE BLDC GLUCOMTR-MCNC: 153 MG/DL — HIGH (ref 70–99)
GLUCOSE BLDC GLUCOMTR-MCNC: 159 MG/DL — HIGH (ref 70–99)
GLUCOSE SERPL-MCNC: 91 MG/DL — SIGNIFICANT CHANGE UP (ref 70–99)
GLUCOSE UR QL: NEGATIVE — SIGNIFICANT CHANGE UP
HCT VFR BLD CALC: 32.3 % — LOW (ref 34.5–45)
HGB BLD-MCNC: 10.1 G/DL — LOW (ref 11.5–15.5)
KETONES UR-MCNC: NEGATIVE — SIGNIFICANT CHANGE UP
LEUKOCYTE ESTERASE UR-ACNC: ABNORMAL
MCHC RBC-ENTMCNC: 29.9 PG — SIGNIFICANT CHANGE UP (ref 27–34)
MCHC RBC-ENTMCNC: 31.3 GM/DL — LOW (ref 32–36)
MCV RBC AUTO: 95.6 FL — SIGNIFICANT CHANGE UP (ref 80–100)
NITRITE UR-MCNC: NEGATIVE — SIGNIFICANT CHANGE UP
PH UR: 6 — SIGNIFICANT CHANGE UP (ref 5–8)
PLATELET # BLD AUTO: 241 K/UL — SIGNIFICANT CHANGE UP (ref 150–400)
POTASSIUM SERPL-MCNC: 3.8 MMOL/L — SIGNIFICANT CHANGE UP (ref 3.5–5.3)
POTASSIUM SERPL-SCNC: 3.8 MMOL/L — SIGNIFICANT CHANGE UP (ref 3.5–5.3)
PROT UR-MCNC: ABNORMAL
RAPID RVP RESULT: SIGNIFICANT CHANGE UP
RBC # BLD: 3.38 M/UL — LOW (ref 3.8–5.2)
RBC # FLD: 17.7 % — HIGH (ref 10.3–14.5)
SODIUM SERPL-SCNC: 144 MMOL/L — SIGNIFICANT CHANGE UP (ref 135–145)
SP GR SPEC: 1.02 — SIGNIFICANT CHANGE UP (ref 1.01–1.02)
SPECIMEN SOURCE: SIGNIFICANT CHANGE UP
SPECIMEN SOURCE: SIGNIFICANT CHANGE UP
UROBILINOGEN FLD QL: NEGATIVE — SIGNIFICANT CHANGE UP
WBC # BLD: 10.48 K/UL — SIGNIFICANT CHANGE UP (ref 3.8–10.5)
WBC # FLD AUTO: 10.48 K/UL — SIGNIFICANT CHANGE UP (ref 3.8–10.5)

## 2018-12-09 PROCEDURE — 99233 SBSQ HOSP IP/OBS HIGH 50: CPT

## 2018-12-09 RX ORDER — CEFTOLOZANE AND TAZOBACTAM 1; .5 G/10ML; G/10ML
1.5 INJECTION, POWDER, LYOPHILIZED, FOR SOLUTION INTRAVENOUS EVERY 8 HOURS
Qty: 0 | Refills: 0 | Status: DISCONTINUED | OUTPATIENT
Start: 2018-12-09 | End: 2018-12-16

## 2018-12-09 RX ORDER — ACETAMINOPHEN 500 MG
650 TABLET ORAL ONCE
Qty: 0 | Refills: 0 | Status: COMPLETED | OUTPATIENT
Start: 2018-12-09 | End: 2018-12-09

## 2018-12-09 RX ADMIN — GABAPENTIN 300 MILLIGRAM(S): 400 CAPSULE ORAL at 06:26

## 2018-12-09 RX ADMIN — Medication 650 MILLIGRAM(S): at 06:26

## 2018-12-09 RX ADMIN — QUETIAPINE FUMARATE 25 MILLIGRAM(S): 200 TABLET, FILM COATED ORAL at 21:26

## 2018-12-09 RX ADMIN — CEFTOLOZANE AND TAZOBACTAM 100 GRAM(S): 1; .5 INJECTION, POWDER, LYOPHILIZED, FOR SOLUTION INTRAVENOUS at 15:08

## 2018-12-09 RX ADMIN — MINOCYCLINE HYDROCHLORIDE 100 MILLIGRAM(S): 45 TABLET, EXTENDED RELEASE ORAL at 06:26

## 2018-12-09 RX ADMIN — FENTANYL CITRATE 1 PATCH: 50 INJECTION INTRAVENOUS at 17:47

## 2018-12-09 RX ADMIN — TIZANIDINE 4 MILLIGRAM(S): 4 TABLET ORAL at 21:26

## 2018-12-09 RX ADMIN — MIRTAZAPINE 7.5 MILLIGRAM(S): 45 TABLET, ORALLY DISINTEGRATING ORAL at 21:27

## 2018-12-09 RX ADMIN — Medication 40 MILLIGRAM(S): at 06:26

## 2018-12-09 RX ADMIN — FENTANYL CITRATE 1 PATCH: 50 INJECTION INTRAVENOUS at 08:37

## 2018-12-09 RX ADMIN — Medication 3 MILLILITER(S): at 17:48

## 2018-12-09 RX ADMIN — Medication 1 APPLICATION(S): at 06:27

## 2018-12-09 RX ADMIN — TIOTROPIUM BROMIDE 1 CAPSULE(S): 18 CAPSULE ORAL; RESPIRATORY (INHALATION) at 12:24

## 2018-12-09 RX ADMIN — CLOPIDOGREL BISULFATE 75 MILLIGRAM(S): 75 TABLET, FILM COATED ORAL at 12:24

## 2018-12-09 RX ADMIN — Medication 3 MILLILITER(S): at 06:29

## 2018-12-09 RX ADMIN — FENTANYL CITRATE 1 PATCH: 50 INJECTION INTRAVENOUS at 17:55

## 2018-12-09 RX ADMIN — Medication 1.5 MILLIGRAM(S): at 21:27

## 2018-12-09 RX ADMIN — Medication 1: at 17:48

## 2018-12-09 RX ADMIN — Medication 1 APPLICATION(S): at 15:08

## 2018-12-09 RX ADMIN — SERTRALINE 50 MILLIGRAM(S): 25 TABLET, FILM COATED ORAL at 12:24

## 2018-12-09 RX ADMIN — CEFTOLOZANE AND TAZOBACTAM 100 GRAM(S): 1; .5 INJECTION, POWDER, LYOPHILIZED, FOR SOLUTION INTRAVENOUS at 21:27

## 2018-12-09 RX ADMIN — FENTANYL CITRATE 1 PATCH: 50 INJECTION INTRAVENOUS at 19:30

## 2018-12-09 RX ADMIN — MINOCYCLINE HYDROCHLORIDE 100 MILLIGRAM(S): 45 TABLET, EXTENDED RELEASE ORAL at 17:47

## 2018-12-09 RX ADMIN — BUDESONIDE AND FORMOTEROL FUMARATE DIHYDRATE 2 PUFF(S): 160; 4.5 AEROSOL RESPIRATORY (INHALATION) at 17:48

## 2018-12-09 RX ADMIN — LISINOPRIL 5 MILLIGRAM(S): 2.5 TABLET ORAL at 06:26

## 2018-12-09 RX ADMIN — Medication 300 MILLIGRAM(S): at 12:24

## 2018-12-09 RX ADMIN — Medication 1 APPLICATION(S): at 06:41

## 2018-12-09 RX ADMIN — Medication 1: at 00:30

## 2018-12-09 RX ADMIN — PANTOPRAZOLE SODIUM 40 MILLIGRAM(S): 20 TABLET, DELAYED RELEASE ORAL at 06:26

## 2018-12-09 RX ADMIN — Medication 650 MILLIGRAM(S): at 08:37

## 2018-12-09 RX ADMIN — GABAPENTIN 300 MILLIGRAM(S): 400 CAPSULE ORAL at 17:48

## 2018-12-09 RX ADMIN — TIZANIDINE 4 MILLIGRAM(S): 4 TABLET ORAL at 09:35

## 2018-12-09 RX ADMIN — Medication 7.5 MILLIGRAM(S): at 09:35

## 2018-12-09 RX ADMIN — Medication 3 MILLILITER(S): at 12:24

## 2018-12-09 NOTE — PROGRESS NOTE ADULT - ASSESSMENT
fevr   sepsis   uti   chf  anxiety   depression    acute possibel uti id called start abx if needed  possible atelactasis vs pneumonia   .    dc on hold   recheck cbc in am   cmp as well   monitor vitals   medical [place] for chronic pain

## 2018-12-09 NOTE — PROVIDER CONTACT NOTE (OTHER) - BACKGROUND
Pt admitted for CHF exacerbation. Pt s/p doxy, now on minocycline PO for MRSA suppression from a recent infection. Pt admitted for CHF exacerbation. Urine cx (+) for PSA. Pt s/p doxy, now on minocycline PO for MRSA suppression from a recent infection.

## 2018-12-09 NOTE — PROGRESS NOTE ADULT - SUBJECTIVE AND OBJECTIVE BOX
CC: f/u for MRSA suppression, reassessment for fever    Patient febrile earlier, daughter reveals pt slightly more lethargic past 2 days; currently arouses easily, cooperative, but provides little info; denies pain; no diarrhea reported    REVIEW OF SYSTEMS:  All other review of systems negative (Comprehensive ROS)    Antimicrobials Day # 1   ceftolozane/tazobactam IVPB 1.5 Gram(s) IV Intermittent every 8 hours  minocycline 100 milliGRAM(s) Oral two times a day    Other Medications Reviewed    T(F): 97.7 (18 @ 11:52), Max: 101.2 (18 @ 23:01)  HR: 89 (18 @ 11:52)  BP: 105/63 (18 @ 11:52)  RR: 20 (18 @ 11:52)  SpO2: 98% (18 @ 11:52)  Wt(kg): --    PHYSICAL EXAM:  General: no acute distress  Eyes:  anicteric, no conjunctival injection, no discharge  Oropharynx: no lesions or injection 	  Neck: supple, without adenopathy  Lungs: clear to auscultation anteriorly  Heart: irregular rhythm; no murmur, rubs or gallops  Abdomen: soft, nondistended, nontender, G tube site clean  Blackman  Skin: no rash  Extremities: R thigh incision well healed  Neurologic: alert, confused, moves all extremities    LAB RESULTS:                        10.1   10.48 )-----------( 241      ( 09 Dec 2018 09:14 )             32.3     12-    144  |  103  |  34<H>  ----------------------------<  91  3.8   |  24  |  0.49<L>    Ca    8.9      09 Dec 2018 07:28  Phos  3.7     12-08  Mg     1.7     12-08    TPro  5.9<L>  /  Alb  3.5  /  TBili  0.5  /  DBili  x   /  AST  18  /  ALT  16  /  AlkPhos  110  12-08    Urinalysis Basic - ( 09 Dec 2018 00:19 )    Color: Yellow / Appearance: Slightly Turbid / S.017 / pH: x  Gluc: x / Ketone: Negative  / Bili: Negative / Urobili: Negative   Blood: x / Protein: Trace / Nitrite: Negative   Leuk Esterase: Large / RBC: 1 /hpf /  /hpf   Sq Epi: x / Non Sq Epi: 1 /hpf / Bacteria: Many      MICROBIOLOGY:  RECENT CULTURES:  Culture - Blood (18 @ 14:59)    Specimen Source: .Blood Blood-Venous    Culture Results:   No growth at 5 days.    Culture - Urine (18 @ 17:44)    -  Amikacin: I 32    -  Piperacillin/Tazobactam: I 64    -  Tobramycin: S 4    -  Meropenem: R >8    -  Gentamicin: R >8    -  Ciprofloxacin: R >2    -  Imipenem: R >8    -  Levofloxacin: R >4    -  Ceftazidime: I 16    -  Aztreonam: R >16    -  Cefepime: I 16    Specimen Source: .Urine Catheterized    Culture Results:   >100,000 CFU/ml Pseudomonas aeruginosa (Carbapenem Resistant)    Organism Identification: Pseudomonas aeruginosa (Carbapenem Resistant)    Organism: Pseudomonas aeruginosa (Carbapenem Resistant)    Method Type: BLANCA    RADIOLOGY REVIEWED:  Xray Chest 1 View- PORTABLE-Urgent (18 @ 23:46) >  Chronic fibrotic lung changes versus mild interstitial edema.  No right lung focal infiltrates.  Interval increase in left pleural effusion (c/w 18 plain chest);   left basal atelectasis and/or pneumonia as above.

## 2018-12-09 NOTE — PROGRESS NOTE ADULT - SUBJECTIVE AND OBJECTIVE BOX
Patient with fever to 101 last night.  She has a nonstageable decubitus but is getting topical antimicrobial therapy.  The urine shows WBC and bacteria.  ID consulted regarding further Abx therapy.  CBC pending.  Patient more alert today with no new focal findings.      Vital Signs Last 24 Hrs  T(C): 36.7 (09 Dec 2018 08:34), Max: 38.4 (08 Dec 2018 23:01)  T(F): 98.1 (09 Dec 2018 08:34), Max: 101.2 (08 Dec 2018 23:01)  HR: 99 (09 Dec 2018 04:56) (93 - 105)  BP: 144/72 (09 Dec 2018 04:56) (127/68 - 156/81)  BP(mean): --  RR: 18 (09 Dec 2018 04:56) (18 - 18)  SpO2: 99% (09 Dec 2018 04:56) (93% - 99%)  Daily     Daily   I&O's Summary    08 Dec 2018 07:01  -  09 Dec 2018 07:00  --------------------------------------------------------  IN: 1310 mL / OUT: 1600 mL / NET: -290 mL        Neck: no bruits, JVD, adenopathy  Chest: clear  Cor: [id number], RR, normal S1S2 with no mrght  Abd: soft, nontender, BS+ and no HSM  Ext: w/o CC trace edema  Pulses:2+->dp bilaterally    MEDICATIONS  (STANDING):  ALBUTerol/ipratropium for Nebulization 3 milliLiter(s) Nebulizer every 6 hours  AQUAPHOR (petrolatum Ointment) 1 Application(s) Topical three times a day  buDESOnide 160 MICROgram(s)/formoterol 4.5 MICROgram(s) Inhaler 2 Puff(s) Inhalation two times a day  clobetasol 0.05% Ointment 1 Application(s) Topical two times a day  clonazePAM Tablet 1.5 milliGRAM(s) Oral at bedtime  clopidogrel Tablet 75 milliGRAM(s) Oral daily  dextrose 5%. 1000 milliLiter(s) (50 mL/Hr) IV Continuous <Continuous>  dextrose 50% Injectable 12.5 Gram(s) IV Push once  dextrose 50% Injectable 25 Gram(s) IV Push once  dextrose 50% Injectable 25 Gram(s) IV Push once  fentaNYL   Patch  12 MICROgram(s)/Hr 1 Patch Transdermal every 72 hours  ferrous    sulfate Liquid 300 milliGRAM(s) Enteral Tube daily  furosemide    Tablet 40 milliGRAM(s) Oral daily  gabapentin   Solution 300 milliGRAM(s) Oral two times a day  insulin lispro (HumaLOG) corrective regimen sliding scale   SubCutaneous every 6 hours  lisinopril 5 milliGRAM(s) Oral daily  medical marijuana oil 1 milliLiter(s) 1 milliLiter(s) SubLingual <User Schedule>  minocycline 100 milliGRAM(s) Oral two times a day  mirtazapine 7.5 milliGRAM(s) Oral at bedtime  pantoprazole   Suspension 40 milliGRAM(s) Oral before breakfast  predniSONE   Tablet 7.5 milliGRAM(s) Oral <User Schedule>  QUEtiapine 25 milliGRAM(s) Oral at bedtime  sertraline 50 milliGRAM(s) Oral daily  tiotropium 18 MICROgram(s) Capsule 1 Capsule(s) Inhalation daily  tiZANidine 4 milliGRAM(s) Oral <User Schedule>    MEDICATIONS  (PRN):  acetaminophen    Suspension .. 680 milliGRAM(s) Oral every 6 hours PRN Temp greater or equal to 38C (100.4F), Mild Pain (1 - 3)  dextrose 40% Gel 15 Gram(s) Oral once PRN Blood Glucose LESS THAN 70 milliGRAM(s)/deciliter  glucagon  Injectable 1 milliGRAM(s) IntraMuscular once PRN Glucose LESS THAN 70 milligrams/deciliter  nystatin Powder 1 Application(s) Topical two times a day PRN rash/itchiness          144  |  103  |  34<H>  ----------------------------<  91  3.8   |  24  |  0.49<L>    Ca    8.9      09 Dec 2018 07:28  Phos  3.7     12-08  Mg     1.7         TPro  5.9<L>  /  Alb  3.5  /  TBili  0.5  /  DBili  x   /  AST  18  /  ALT  16  /  AlkPhos  110                            10.6   9.04  )-----------( 235      ( 08 Dec 2018 08:00 )             33.9       Urinalysis Basic - ( 09 Dec 2018 00:19 )    Color: Yellow / Appearance: Slightly Turbid / S.017 / pH: x  Gluc: x / Ketone: Negative  / Bili: Negative / Urobili: Negative   Blood: x / Protein: Trace / Nitrite: Negative   Leuk Esterase: Large / RBC: 1 /hpf /  /hpf   Sq Epi: x / Non Sq Epi: 1 /hpf / Bacteria: Many      HEALTH ISSUES - PROBLEM Dx:  Cardiomyopathy: continue current regimen.  Oral Lasix started; no overt CHF  Dementia: somewhat more alert today  Fever:ID called

## 2018-12-09 NOTE — PROGRESS NOTE ADULT - SUBJECTIVE AND OBJECTIVE BOX
---___---___---___---___---___---___ ---___---___---___---___---___---___---___---___---___---                    <<<  M E D I C A L   A T T E N D I N G    F O L L O W    U P   N O T E  >>>    - Patient seen and examined by me approximately thirty minutes ago.  - In summary, patient is a 68y year old Female who origianlly presented with elevated bnp was slated for dc but now has fever  -     ---___---___---___---___---___---      <<<  MEDICATIONS:  >>>    MEDICATIONS  (STANDING):  ALBUTerol/ipratropium for Nebulization 3 milliLiter(s) Nebulizer every 6 hours  AQUAPHOR (petrolatum Ointment) 1 Application(s) Topical three times a day  buDESOnide 160 MICROgram(s)/formoterol 4.5 MICROgram(s) Inhaler 2 Puff(s) Inhalation two times a day  ceftolozane/tazobactam IVPB 1.5 Gram(s) IV Intermittent every 8 hours  clobetasol 0.05% Ointment 1 Application(s) Topical two times a day  clonazePAM Tablet 1.5 milliGRAM(s) Oral at bedtime  clopidogrel Tablet 75 milliGRAM(s) Oral daily  dextrose 5%. 1000 milliLiter(s) (50 mL/Hr) IV Continuous <Continuous>  dextrose 50% Injectable 12.5 Gram(s) IV Push once  dextrose 50% Injectable 25 Gram(s) IV Push once  dextrose 50% Injectable 25 Gram(s) IV Push once  fentaNYL   Patch  12 MICROgram(s)/Hr 1 Patch Transdermal every 72 hours  ferrous    sulfate Liquid 300 milliGRAM(s) Enteral Tube daily  furosemide    Tablet 40 milliGRAM(s) Oral daily  gabapentin   Solution 300 milliGRAM(s) Oral two times a day  insulin lispro (HumaLOG) corrective regimen sliding scale   SubCutaneous every 6 hours  lisinopril 5 milliGRAM(s) Oral daily  medical marijuana oil 1 milliLiter(s) 1 milliLiter(s) SubLingual <User Schedule>  minocycline 100 milliGRAM(s) Oral two times a day  mirtazapine 7.5 milliGRAM(s) Oral at bedtime  pantoprazole   Suspension 40 milliGRAM(s) Oral before breakfast  predniSONE   Tablet 7.5 milliGRAM(s) Oral <User Schedule>  QUEtiapine 25 milliGRAM(s) Oral at bedtime  sertraline 50 milliGRAM(s) Oral daily  tiotropium 18 MICROgram(s) Capsule 1 Capsule(s) Inhalation daily  tiZANidine 4 milliGRAM(s) Oral <User Schedule>      MEDICATIONS  (PRN):  acetaminophen    Suspension .. 680 milliGRAM(s) Oral every 6 hours PRN Temp greater or equal to 38C (100.4F), Mild Pain (1 - 3)  dextrose 40% Gel 15 Gram(s) Oral once PRN Blood Glucose LESS THAN 70 milliGRAM(s)/deciliter  glucagon  Injectable 1 milliGRAM(s) IntraMuscular once PRN Glucose LESS THAN 70 milligrams/deciliter  nystatin Powder 1 Application(s) Topical two times a day PRN rash/itchiness       ---___---___---___---___---___---     <<<REVIEW OF SYSTEM: >>>    GEN: no fever, no chills, no pain  RESP: no SOB, no cough, no sputum  CVS: no chest pain, no palpitations, no edema  GI: no abdominal pain, no nausea, no vomiting, no constipation, no diarrhea  : no dysurea, no frequency  NEURO: no headache, no dizziness  PSYCH: no depression, not anxious  Derm : no itching, no rash     ---___---___---___---___---___---          <<<  VITAL SIGNS: >>>    T(F): 97.7 (18 @ 11:52), Max: 101.2 (18 @ 23:01)  HR: 89 (18 @ 11:52) (89 - 105)  BP: 105/63 (18 @ 11:52) (105/63 - 156/81)  RR: 20 (18 @ 11:52) (18 - 20)  SpO2: 98% (18 @ 11:52) (98% - 99%)  Wt(kg): --  CAPILLARY BLOOD GLUCOSE      POCT Blood Glucose.: 134 mg/dL (09 Dec 2018 11:49)    I&O's Summary    08 Dec 2018 07:01  -  09 Dec 2018 07:00  --------------------------------------------------------  IN: 1310 mL / OUT: 1600 mL / NET: -290 mL         ---___---___---___---___---___---                       PHYSICAL EXAM:    GEN: A&O X 0 , NAD , comfortable  HEENT: NCAT, PERRL, MMM, no scleral icterus, hearing intact  NECK: Supple, No JVD  CVS: S1S2 , regular , No M/R/G appreciated  PULM questionable rhonchi noted   ABD.: soft. non tender, non distended,  bowel sounds present peg noted   Extrem: intact pulses , no edema noted  Derm: No rash or ecchymosis noted  PSYCH: normal mood, no depression, not anxious     ---___---___---___---___---___---     <<<  LAB AND IMAGING: >>>                          10.1   10.48 )-----------( 241      ( 09 Dec 2018 09:14 )             32.3               -    144  |  103  |  34<H>  ----------------------------<  91  3.8   |  24  |  0.49<L>    Ca    8.9      09 Dec 2018 07:28  Phos  3.7     12-08  Mg     1.7         TPro  5.9<L>  /  Alb  3.5  /  TBili  0.5  /  DBili  x   /  AST  18  /  ALT  16  /  AlkPhos  110  12-08           Urinalysis Basic - ( 09 Dec 2018 00:19 )    Color: Yellow / Appearance: Slightly Turbid / S.017 / pH: x  Gluc: x / Ketone: Negative  / Bili: Negative / Urobili: Negative   Blood: x / Protein: Trace / Nitrite: Negative   Leuk Esterase: Large / RBC: 1 /hpf /  /hpf   Sq Epi: x / Non Sq Epi: 1 /hpf / Bacteria: Many                        [All pertinent / recent available Imaging reports and other labs reviewed]     ---___---___---___---___---___---___ ---___---___---___---___---           <<<  A S S E S S M E N T   A N D   P L A N :  >>>          -GI/DVT Prophylaxis.    --------------------------------------------  Case discussed with   Education given on     >>______________________<<      Deniz Bolden .         phone   [phone]

## 2018-12-09 NOTE — PROVIDER CONTACT NOTE (OTHER) - BACKGROUND
Pt admitted for CHF exacerbation. Urine cx (+) for PSA. Pt s/p doxy, now on minocycline PO for MRSA suppression from a recent infection.

## 2018-12-09 NOTE — PROGRESS NOTE ADULT - ASSESSMENT
67 yo female with UC, dementia, prior prolonged hosp stay, returns for CHF management  On MCN suppression for prior R hip MRSA infection- spacer; wound clean  Recent Tx for aspiration pneumonia  Prior drug rash resolved  MDR Pseudomonas in urine, longstanding gavin  Fever overnight, resolved this afternoon  WBC normal, Creat normal  Appears close to baseline in general, but difficult to exclude at minimum, CAUTI  CXR likely nonspecific    Suggest:  With fever, will cover for MDR Pseudomonas UTI- Zerbaxa; this will likely address any ? of pneumonia  Await repeat Cxs- if Pseudomonas isolated again, will ask Micro to check E test for Zerbaxa  Follow temps and CBC/diff   Continue  per peg BID for MRSA suppression  D/w daughter at bedside

## 2018-12-10 LAB
ANION GAP SERPL CALC-SCNC: 14 MMOL/L — SIGNIFICANT CHANGE UP (ref 5–17)
BUN SERPL-MCNC: 38 MG/DL — HIGH (ref 7–23)
CALCIUM SERPL-MCNC: 9 MG/DL — SIGNIFICANT CHANGE UP (ref 8.4–10.5)
CHLORIDE SERPL-SCNC: 103 MMOL/L — SIGNIFICANT CHANGE UP (ref 96–108)
CO2 SERPL-SCNC: 28 MMOL/L — SIGNIFICANT CHANGE UP (ref 22–31)
CREAT SERPL-MCNC: 0.52 MG/DL — SIGNIFICANT CHANGE UP (ref 0.5–1.3)
GLUCOSE BLDC GLUCOMTR-MCNC: 112 MG/DL — HIGH (ref 70–99)
GLUCOSE BLDC GLUCOMTR-MCNC: 137 MG/DL — HIGH (ref 70–99)
GLUCOSE BLDC GLUCOMTR-MCNC: 149 MG/DL — HIGH (ref 70–99)
GLUCOSE BLDC GLUCOMTR-MCNC: 157 MG/DL — HIGH (ref 70–99)
GLUCOSE SERPL-MCNC: 103 MG/DL — HIGH (ref 70–99)
HCT VFR BLD CALC: 32.6 % — LOW (ref 34.5–45)
HGB BLD-MCNC: 10.3 G/DL — LOW (ref 11.5–15.5)
MCHC RBC-ENTMCNC: 30.8 PG — SIGNIFICANT CHANGE UP (ref 27–34)
MCHC RBC-ENTMCNC: 31.6 GM/DL — LOW (ref 32–36)
MCV RBC AUTO: 97.6 FL — SIGNIFICANT CHANGE UP (ref 80–100)
PLATELET # BLD AUTO: 262 K/UL — SIGNIFICANT CHANGE UP (ref 150–400)
POTASSIUM SERPL-MCNC: 3.6 MMOL/L — SIGNIFICANT CHANGE UP (ref 3.5–5.3)
POTASSIUM SERPL-SCNC: 3.6 MMOL/L — SIGNIFICANT CHANGE UP (ref 3.5–5.3)
RBC # BLD: 3.34 M/UL — LOW (ref 3.8–5.2)
RBC # FLD: 17.6 % — HIGH (ref 10.3–14.5)
SODIUM SERPL-SCNC: 145 MMOL/L — SIGNIFICANT CHANGE UP (ref 135–145)
WBC # BLD: 9.83 K/UL — SIGNIFICANT CHANGE UP (ref 3.8–10.5)
WBC # FLD AUTO: 9.83 K/UL — SIGNIFICANT CHANGE UP (ref 3.8–10.5)

## 2018-12-10 PROCEDURE — 99233 SBSQ HOSP IP/OBS HIGH 50: CPT

## 2018-12-10 RX ORDER — FENTANYL CITRATE 50 UG/ML
1 INJECTION INTRAVENOUS
Qty: 0 | Refills: 0 | Status: DISCONTINUED | OUTPATIENT
Start: 2018-12-10 | End: 2018-12-15

## 2018-12-10 RX ORDER — CLONAZEPAM 1 MG
1.5 TABLET ORAL AT BEDTIME
Qty: 0 | Refills: 0 | Status: DISCONTINUED | OUTPATIENT
Start: 2018-12-10 | End: 2018-12-17

## 2018-12-10 RX ADMIN — Medication 40 MILLIGRAM(S): at 05:18

## 2018-12-10 RX ADMIN — SERTRALINE 50 MILLIGRAM(S): 25 TABLET, FILM COATED ORAL at 12:39

## 2018-12-10 RX ADMIN — PANTOPRAZOLE SODIUM 40 MILLIGRAM(S): 20 TABLET, DELAYED RELEASE ORAL at 05:18

## 2018-12-10 RX ADMIN — CLOPIDOGREL BISULFATE 75 MILLIGRAM(S): 75 TABLET, FILM COATED ORAL at 12:39

## 2018-12-10 RX ADMIN — BUDESONIDE AND FORMOTEROL FUMARATE DIHYDRATE 2 PUFF(S): 160; 4.5 AEROSOL RESPIRATORY (INHALATION) at 05:17

## 2018-12-10 RX ADMIN — MINOCYCLINE HYDROCHLORIDE 100 MILLIGRAM(S): 45 TABLET, EXTENDED RELEASE ORAL at 05:18

## 2018-12-10 RX ADMIN — GABAPENTIN 300 MILLIGRAM(S): 400 CAPSULE ORAL at 05:17

## 2018-12-10 RX ADMIN — TIOTROPIUM BROMIDE 1 CAPSULE(S): 18 CAPSULE ORAL; RESPIRATORY (INHALATION) at 12:39

## 2018-12-10 RX ADMIN — CEFTOLOZANE AND TAZOBACTAM 100 GRAM(S): 1; .5 INJECTION, POWDER, LYOPHILIZED, FOR SOLUTION INTRAVENOUS at 13:48

## 2018-12-10 RX ADMIN — CEFTOLOZANE AND TAZOBACTAM 100 GRAM(S): 1; .5 INJECTION, POWDER, LYOPHILIZED, FOR SOLUTION INTRAVENOUS at 05:17

## 2018-12-10 RX ADMIN — Medication 1.5 MILLIGRAM(S): at 21:08

## 2018-12-10 RX ADMIN — FENTANYL CITRATE 1 PATCH: 50 INJECTION INTRAVENOUS at 07:36

## 2018-12-10 RX ADMIN — Medication 3 MILLILITER(S): at 05:18

## 2018-12-10 RX ADMIN — TIZANIDINE 4 MILLIGRAM(S): 4 TABLET ORAL at 21:09

## 2018-12-10 RX ADMIN — Medication 300 MILLIGRAM(S): at 12:39

## 2018-12-10 RX ADMIN — Medication 3 MILLILITER(S): at 21:11

## 2018-12-10 RX ADMIN — GABAPENTIN 300 MILLIGRAM(S): 400 CAPSULE ORAL at 17:06

## 2018-12-10 RX ADMIN — MIRTAZAPINE 7.5 MILLIGRAM(S): 45 TABLET, ORALLY DISINTEGRATING ORAL at 21:08

## 2018-12-10 RX ADMIN — MINOCYCLINE HYDROCHLORIDE 100 MILLIGRAM(S): 45 TABLET, EXTENDED RELEASE ORAL at 17:06

## 2018-12-10 RX ADMIN — QUETIAPINE FUMARATE 25 MILLIGRAM(S): 200 TABLET, FILM COATED ORAL at 21:09

## 2018-12-10 RX ADMIN — LISINOPRIL 5 MILLIGRAM(S): 2.5 TABLET ORAL at 05:18

## 2018-12-10 RX ADMIN — BUDESONIDE AND FORMOTEROL FUMARATE DIHYDRATE 2 PUFF(S): 160; 4.5 AEROSOL RESPIRATORY (INHALATION) at 17:05

## 2018-12-10 RX ADMIN — Medication 7.5 MILLIGRAM(S): at 08:57

## 2018-12-10 RX ADMIN — Medication 3 MILLILITER(S): at 17:06

## 2018-12-10 RX ADMIN — CEFTOLOZANE AND TAZOBACTAM 100 GRAM(S): 1; .5 INJECTION, POWDER, LYOPHILIZED, FOR SOLUTION INTRAVENOUS at 21:09

## 2018-12-10 RX ADMIN — Medication 3 MILLILITER(S): at 12:39

## 2018-12-10 RX ADMIN — TIZANIDINE 4 MILLIGRAM(S): 4 TABLET ORAL at 08:57

## 2018-12-10 RX ADMIN — Medication 1: at 12:40

## 2018-12-10 NOTE — PROGRESS NOTE ADULT - SUBJECTIVE AND OBJECTIVE BOX
HPI:  Fevers have not recurred since yesterday AM.  No leukocytosis.  No evidence of volume overload on exam.    Review Of Systems:           Respiratory: No shortness of breath, cough, or wheezing  Cardiovascular: No chest pain or palpitations  10 point review of systems is otherwise negative except as mentioned above        Medications:  acetaminophen    Suspension .. 680 milliGRAM(s) Oral every 6 hours PRN  ALBUTerol/ipratropium for Nebulization 3 milliLiter(s) Nebulizer every 6 hours  AQUAPHOR (petrolatum Ointment) 1 Application(s) Topical three times a day  buDESOnide 160 MICROgram(s)/formoterol 4.5 MICROgram(s) Inhaler 2 Puff(s) Inhalation two times a day  ceftolozane/tazobactam IVPB 1.5 Gram(s) IV Intermittent every 8 hours  clobetasol 0.05% Ointment 1 Application(s) Topical two times a day  clonazePAM Tablet 1.5 milliGRAM(s) Oral at bedtime  clopidogrel Tablet 75 milliGRAM(s) Oral daily  dextrose 40% Gel 15 Gram(s) Oral once PRN  dextrose 5%. 1000 milliLiter(s) IV Continuous <Continuous>  dextrose 50% Injectable 12.5 Gram(s) IV Push once  dextrose 50% Injectable 25 Gram(s) IV Push once  dextrose 50% Injectable 25 Gram(s) IV Push once  fentaNYL   Patch  12 MICROgram(s)/Hr 1 Patch Transdermal every 72 hours  ferrous    sulfate Liquid 300 milliGRAM(s) Enteral Tube daily  furosemide    Tablet 40 milliGRAM(s) Oral daily  gabapentin   Solution 300 milliGRAM(s) Oral two times a day  glucagon  Injectable 1 milliGRAM(s) IntraMuscular once PRN  insulin lispro (HumaLOG) corrective regimen sliding scale   SubCutaneous every 6 hours  lisinopril 5 milliGRAM(s) Oral daily  medical marijuana oil 1 milliLiter(s),medical marijuana oil 1 milliLiter(s) 1 milliLiter(s) SubLingual <User Schedule>  minocycline 100 milliGRAM(s) Oral two times a day  mirtazapine 7.5 milliGRAM(s) Oral at bedtime  nystatin Powder 1 Application(s) Topical two times a day PRN  pantoprazole   Suspension 40 milliGRAM(s) Oral before breakfast  predniSONE   Tablet 7.5 milliGRAM(s) Oral <User Schedule>  QUEtiapine 25 milliGRAM(s) Oral at bedtime  sertraline 50 milliGRAM(s) Oral daily  tiotropium 18 MICROgram(s) Capsule 1 Capsule(s) Inhalation daily  tiZANidine 4 milliGRAM(s) Oral <User Schedule>    PAST MEDICAL & SURGICAL HISTORY:  CVA (cerebral vascular accident)  Fatty pancreas  PNA (pneumonia)  Pulmonary HTN  IGT (impaired glucose tolerance)  Ulcerative colitis  Acid reflux  Anxiety  Depression  Mouth sores  HLD (hyperlipidemia)  Asthma  Humeral head fracture  H/O: hysterectomy  H/O cataract extraction, left  History of knee replacement    Vitals:  T(C): 36.8 (12-10-18 @ 12:13), Max: 36.8 (12-09-18 @ 21:07)  HR: 92 (12-10-18 @ 12:13) (92 - 107)  BP: 134/76 (12-10-18 @ 12:13) (119/70 - 142/77)  BP(mean): --  RR: 18 (12-10-18 @ 12:13) (18 - 19)  SpO2: 96% (12-10-18 @ 12:13) (96% - 98%)  Wt(kg): --  Daily     Daily   I&O's Summary    09 Dec 2018 07:01  -  10 Dec 2018 07:00  --------------------------------------------------------  IN: 1250 mL / OUT: 2145 mL / NET: -895 mL    10 Dec 2018 07:01  -  10 Dec 2018 20:13  --------------------------------------------------------  IN: 850 mL / OUT: 1050 mL / NET: -200 mL        Physical Exam:  Appearance: appears older than stated age; bedbound, demented; no acute distress  Eyes: PERRL, EOMI, pink conjunctiva  HENT: Normal oral mucosa  Cardiovascular: RRR, S1, S2; trace LE edema bilaterally; +JVD  Respiratory: fine bibasilar rales  Gastrointestinal: soft, non-tender, non-distended with normal bowel sounds; +PEG  Musculoskeletal: Bedbound, contracted  Neurologic: cranial nerves grossly intact  Lymphatic: No lymphadenopathy  Psychiatry: awake and alert  Skin: No rashes, ecchymoses, or cyanosis                            10.3   9.83  )-----------( 262      ( 10 Dec 2018 07:54 )             32.6     12-10    145  |  103  |  38<H>  ----------------------------<  103<H>  3.6   |  28  |  0.52    Ca    9.0      10 Dec 2018 06:42              Echo: < from: Transthoracic Echocardiogram (12.03.18 @ 11:23) >  ------------------------------------------------------------------------  Conclusions:  1. Mitral annular calcification, otherwise normal mitral  valve. Moderate-severe mitral regurgitation (vena contracta  0.8 cm)  2. Calcified trileafletaortic valve with normal opening.  Peak transaortic valve gradient equals 3 mm Hg, mean  transaortic valve gradient equals 1 mm Hg, aortic valve  velocity time integral equals 13 cm. Moderate aortic  regurgitation  3. Moderately dilated left atrium.LA volume index = 45  cc/m2.  4. Severe global left ventricular systolic dysfunction.  5. Increased E/e'  is consistent with elevated left  ventricular filling pressure.  6. Moderate right atrial enlargement. Inferior vena cava is  dilated (>=2.5cm) with normal respiratory variability  consistent with right atrial pressure 16-20mm Hg.  7. Right ventricular enlargement with decreased right  ventricular systolic function.  8. Normal tricuspid valve. Severe tricuspid regurgitation.  9. Estimated pulmonary artery systolic pressure equals 82  mm Hg, assuming right atrial pressure equals 8 mm Hg,  consistent with severe pulmonary pressures.  10. Color Doppler demonstrates evidence of left to right  shunt  ------------------------------------------------------------------------  Confirmed on  12/3/2018 - 16:37:07 by Margaret Khan M.D.  ------------------------------------------------------------------------    < end of copied text >    Imaging: < from: Xray Chest 1 View- PORTABLE-Urgent (12.02.18 @ 17:19) >  IMPRESSION:   Limited study due to patient positioning.  Trace right pleural effusion and likely left pleural effusion. Question   mild pulmonary edema.    HUAN JACOBS M.D., RADIOLOGY RESIDENT  This document has been electronically signed.  TONYA AARON M.D., ATTENDING RADIOLOGIST  This document has been electronically signed. Dec  2 2018  9:17PM      < end of copied text >    Interpretation of Telemetry: sinus rhythm

## 2018-12-10 NOTE — PROGRESS NOTE ADULT - ASSESSMENT
fever   chrinic in dwelling catheter   uti   demntia   chf   anxiety   chronic pain     will folow labs and vitals and monitor cultures need clearance for id prior to dc

## 2018-12-10 NOTE — PROGRESS NOTE ADULT - ASSESSMENT
68 year-old woman with Alzheimer's dementia seen to have volume overload and biventricular congestive heart failure.  TTE on this admission shows reduced LVEF, elevated pulmonary pressures.    Continue Lasix 40 mg via PEG daily.  Keep O > I.  Keep K > 4.0 and Mag > 2.0.  Monitor BUN/Cr with daily labs until discharge.    Given severely reduced LVEF, plan to continue ACEi and uptitrate as blood pressure permits.  Can uptitrate to lisinopril 10mg daily.  Will defer starting beta-blocker in this patient as she has baseline restrictive airway disease and is beta-agonist inhalers. Can reconsider low dose carvedilol as outpatient.    Case discussed with primary care physician and admitting hospitalist, Deniz Bolden MD.  Discharge planning per primary team.

## 2018-12-10 NOTE — PROGRESS NOTE ADULT - SUBJECTIVE AND OBJECTIVE BOX
---___---___---___---___---___---___ ---___---___---___---___---___---___---___---___---___---                    <<<  M E D I C A L   A T T E N D I N G    F O L L O W    U P   N O T E  >>>    - Patient seen and examined by me approximately thirty minutes ago.  - In summary, patient is a 68y year old Female who origianlly presented with  elevate dbnp now resolved but has has fever  fo r the past two days   - Patient today overall doing ok, comfortable, eating OK.     ---___---___---___---___---___---      <<<  MEDICATIONS:  >>>    MEDICATIONS  (STANDING):  ALBUTerol/ipratropium for Nebulization 3 milliLiter(s) Nebulizer every 6 hours  AQUAPHOR (petrolatum Ointment) 1 Application(s) Topical three times a day  buDESOnide 160 MICROgram(s)/formoterol 4.5 MICROgram(s) Inhaler 2 Puff(s) Inhalation two times a day  ceftolozane/tazobactam IVPB 1.5 Gram(s) IV Intermittent every 8 hours  clobetasol 0.05% Ointment 1 Application(s) Topical two times a day  clonazePAM Tablet 1.5 milliGRAM(s) Oral at bedtime  clopidogrel Tablet 75 milliGRAM(s) Oral daily  dextrose 5%. 1000 milliLiter(s) (50 mL/Hr) IV Continuous <Continuous>  dextrose 50% Injectable 12.5 Gram(s) IV Push once  dextrose 50% Injectable 25 Gram(s) IV Push once  dextrose 50% Injectable 25 Gram(s) IV Push once  fentaNYL   Patch  12 MICROgram(s)/Hr 1 Patch Transdermal every 72 hours  ferrous    sulfate Liquid 300 milliGRAM(s) Enteral Tube daily  furosemide    Tablet 40 milliGRAM(s) Oral daily  gabapentin   Solution 300 milliGRAM(s) Oral two times a day  insulin lispro (HumaLOG) corrective regimen sliding scale   SubCutaneous every 6 hours  lisinopril 5 milliGRAM(s) Oral daily  minocycline 100 milliGRAM(s) Oral two times a day  mirtazapine 7.5 milliGRAM(s) Oral at bedtime  pantoprazole   Suspension 40 milliGRAM(s) Oral before breakfast  predniSONE   Tablet 7.5 milliGRAM(s) Oral <User Schedule>  QUEtiapine 25 milliGRAM(s) Oral at bedtime  sertraline 50 milliGRAM(s) Oral daily  tiotropium 18 MICROgram(s) Capsule 1 Capsule(s) Inhalation daily  tiZANidine 4 milliGRAM(s) Oral <User Schedule>      MEDICATIONS  (PRN):  acetaminophen    Suspension .. 680 milliGRAM(s) Oral every 6 hours PRN Temp greater or equal to 38C (100.4F), Mild Pain (1 - 3)  dextrose 40% Gel 15 Gram(s) Oral once PRN Blood Glucose LESS THAN 70 milliGRAM(s)/deciliter  glucagon  Injectable 1 milliGRAM(s) IntraMuscular once PRN Glucose LESS THAN 70 milligrams/deciliter  nystatin Powder 1 Application(s) Topical two times a day PRN rash/itchiness       ---___---___---___---___---___---     <<<REVIEW OF SYSTEM: >>>    GEN: no fever, no chills, no pain  RESP: no SOB, no cough, no sputum  CVS: no chest pain, no palpitations, no edema  GI: no abdominal pain, no nausea, no vomiting, no constipation, no diarrhea  : no dysurea, no frequency  NEURO: no headache, no dizziness  PSYCH: no depression, not anxious  Derm : no itching, no rash     ---___---___---___---___---___---          <<<  VITAL SIGNS: >>>    T(F): 98.3 (12-10-18 @ 12:13), Max: 98.3 (12-10-18 @ 12:13)  HR: 92 (12-10-18 @ 12:13) (92 - 107)  BP: 134/76 (12-10-18 @ 12:13) (119/70 - 142/77)  RR: 18 (12-10-18 @ 12:13) (18 - 19)  SpO2: 96% (12-10-18 @ 12:13) (96% - 98%)  Wt(kg): --  CAPILLARY BLOOD GLUCOSE      POCT Blood Glucose.: 157 mg/dL (10 Dec 2018 12:07)    I&O's Summary    09 Dec 2018 07:01  -  10 Dec 2018 07:00  --------------------------------------------------------  IN: 1250 mL / OUT: 2145 mL / NET: -895 mL    10 Dec 2018 07:01  -  10 Dec 2018 16:20  --------------------------------------------------------  IN: 0 mL / OUT: 1050 mL / NET: -1050 mL         ---___---___---___---___---___---                       PHYSICAL EXAM:    GEN: A&O X 3 , NAD , comfortable  HEENT: NCAT, PERRL, MMM, no scleral icterus, hearing intact  NECK: Supple, No JVD  CVS: S1S2 , regular , No M/R/G appreciated  PULM: CTA B/L,  no W/R/R appreciated  ABD.: soft. non tender, non distended,  bowel sounds present peg tube noted   Extrem: intact pulses , no edema noted  Derm: No rash or ecchymosis noted  PSYCH: normal mood, no depression, not anxious     ---___---___---___---___---___---     <<<  LAB AND IMAGING: >>>                          10.3   9.83  )-----------( 262      ( 10 Dec 2018 07:54 )             32.6               12-10    145  |  103  |  38<H>  ----------------------------<  103<H>  3.6   |  28  |  0.52    Ca    9.0      10 Dec 2018 06:42             Urinalysis Basic - ( 09 Dec 2018 00:19 )    Color: Yellow / Appearance: Slightly Turbid / S.017 / pH: x  Gluc: x / Ketone: Negative  / Bili: Negative / Urobili: Negative   Blood: x / Protein: Trace / Nitrite: Negative   Leuk Esterase: Large / RBC: 1 /hpf /  /hpf   Sq Epi: x / Non Sq Epi: 1 /hpf / Bacteria: Many                        [All pertinent / recent available Imaging reports and other labs reviewed]     ---___---___---___---___---___---___ ---___---___---___---___---           <<<  A S S E S S M E N T   A N D   P L A N :  >>>          -GI/DVT Prophylaxis.    --------------------------------------------  Case discussed with   Education given on     >>______________________<<      Deniz Bolden .         phone   [phone]

## 2018-12-10 NOTE — CHART NOTE - NSCHARTNOTEFT_GEN_A_CORE
Nutrition Follow Up Note  Patient seen for: nutrition follow on 4MON    Chart reviewed, events noted. 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     Source:  at bedside, RN, medical record     Diet : NPO with enteral feeds     Enteral /Parenteral Nutrition: Pt receiving bolus feeds of Vital AF 1.2 @ 200ml q 4hs (5 feeds per day). Current regimen provides 1000ml total volume, 1200kcal (26Kcal/Kg) and 75g (1.65g/Kg) protein and 811ml free water based on current dosing wt of 45.4lbs. Per  and RN, pt noted with increased frequency of BMs.  reports BMs more liquid as well. No abdominal pain noted. Last BM 12/9. Of note pt on antibiotics.       Weights:   11/13: 105lbs   Dosing wt 99.88lbs (12/4)  12/3: 123.8lbs   12/4: 122.5lbs   12/5: 122.1lbs     Pertinent Medications: MEDICATIONS  (STANDING):  ALBUTerol/ipratropium for Nebulization 3 milliLiter(s) Nebulizer every 6 hours  AQUAPHOR (petrolatum Ointment) 1 Application(s) Topical three times a day  buDESOnide 160 MICROgram(s)/formoterol 4.5 MICROgram(s) Inhaler 2 Puff(s) Inhalation two times a day  ceftolozane/tazobactam IVPB 1.5 Gram(s) IV Intermittent every 8 hours  clobetasol 0.05% Ointment 1 Application(s) Topical two times a day  clonazePAM Tablet 1.5 milliGRAM(s) Oral at bedtime  clopidogrel Tablet 75 milliGRAM(s) Oral daily  dextrose 5%. 1000 milliLiter(s) (50 mL/Hr) IV Continuous <Continuous>  dextrose 50% Injectable 12.5 Gram(s) IV Push once  dextrose 50% Injectable 25 Gram(s) IV Push once  dextrose 50% Injectable 25 Gram(s) IV Push once  fentaNYL   Patch  12 MICROgram(s)/Hr 1 Patch Transdermal every 72 hours  ferrous    sulfate Liquid 300 milliGRAM(s) Enteral Tube daily  furosemide    Tablet 40 milliGRAM(s) Oral daily  gabapentin   Solution 300 milliGRAM(s) Oral two times a day  insulin lispro (HumaLOG) corrective regimen sliding scale   SubCutaneous every 6 hours  lisinopril 5 milliGRAM(s) Oral daily  minocycline 100 milliGRAM(s) Oral two times a day  mirtazapine 7.5 milliGRAM(s) Oral at bedtime  pantoprazole   Suspension 40 milliGRAM(s) Oral before breakfast  predniSONE   Tablet 7.5 milliGRAM(s) Oral <User Schedule>  QUEtiapine 25 milliGRAM(s) Oral at bedtime  sertraline 50 milliGRAM(s) Oral daily  tiotropium 18 MICROgram(s) Capsule 1 Capsule(s) Inhalation daily  tiZANidine 4 milliGRAM(s) Oral <User Schedule>    MEDICATIONS  (PRN):  acetaminophen    Suspension .. 680 milliGRAM(s) Oral every 6 hours PRN Temp greater or equal to 38C (100.4F), Mild Pain (1 - 3)  dextrose 40% Gel 15 Gram(s) Oral once PRN Blood Glucose LESS THAN 70 milliGRAM(s)/deciliter  glucagon  Injectable 1 milliGRAM(s) IntraMuscular once PRN Glucose LESS THAN 70 milligrams/deciliter  nystatin Powder 1 Application(s) Topical two times a day PRN rash/itchiness    Pertinent Labs: 12-10 @ 06:42: Na 145, BUN 38<H>, Cr 0.52, <H>, K+ 3.6, Phos --, Mg --, Alk Phos --, ALT/SGPT --, AST/SGOT --, HbA1c --    Finger Sticks:  POCT Blood Glucose.: 112 mg/dL (12-10 @ 06:01)  POCT Blood Glucose.: 137 mg/dL (12-10 @ 00:21)  POCT Blood Glucose.: 153 mg/dL (12-09 @ 17:21)      Skin per nursing documentation:  unstageable sacrum, stage 2 sacrum  Edema: +1 right leg, +2 left leg     Estimated Needs:   [ x] no change since previous assessment  [ ] recalculated:     Previous Nutrition Diagnosis:  Increased nutrient needs (specify)  Nutrition Diagnosis is: on going    New Nutrition Diagnosis: N/A       Interventions:   Recommend  1) Recommend continue bolus feeds of Vital 1.2 AF @ 200ml q 4hs (5 feeds per day from 8am-10pm). Current regimen provides 1000ml total volume, 1200kcal (26Kcal/Kg) and 75g (1.65g/Kg) protein and 811ml free water based on current dosing wt of 45.4lbs. Defer additional free water to team. Current regimen providing 811ml free water.  2) Recommend the addition of Banatrol Plus 2 packets to per day to help promote stool regularity   3) Monitor tolerance to EN and adjust as needed.     Monitoring and Evaluation:     Continue to monitor Nutritional intake, Tolerance to diet prescription, weights, labs, skin integrity  Shasha Hare RD, CDN, Pager # [phone]   RD remains available upon request and will follow up per protocol Nutrition Follow Up Note  Patient seen for: nutrition follow on 4MON    Chart reviewed, events noted. 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     Source:  at bedside, RN, medical record     Diet : NPO with enteral feeds     Enteral /Parenteral Nutrition: Pt receiving bolus feeds of Vital AF 1.2 @ 200ml q 4hs (5 feeds per day). Current regimen provides 1000ml total volume, 1200kcal (26Kcal/Kg) and 75g (1.65g/Kg) protein and 811ml free water based on current dosing wt of 45.4lbs. Per  and RN, pt noted with increased frequency of BMs.  reports BMs more liquid as well. No abdominal pain noted. Last BM 12/9. Of note pt on antibiotics.       Weights:   11/13: 105lbs   Dosing wt 99.88lbs (12/4)  12/3: 123.8lbs   12/4: 122.5lbs   12/5: 122.1lbs     Pertinent Medications: MEDICATIONS  (STANDING):  ALBUTerol/ipratropium for Nebulization 3 milliLiter(s) Nebulizer every 6 hours  AQUAPHOR (petrolatum Ointment) 1 Application(s) Topical three times a day  buDESOnide 160 MICROgram(s)/formoterol 4.5 MICROgram(s) Inhaler 2 Puff(s) Inhalation two times a day  ceftolozane/tazobactam IVPB 1.5 Gram(s) IV Intermittent every 8 hours  clobetasol 0.05% Ointment 1 Application(s) Topical two times a day  clonazePAM Tablet 1.5 milliGRAM(s) Oral at bedtime  clopidogrel Tablet 75 milliGRAM(s) Oral daily  dextrose 5%. 1000 milliLiter(s) (50 mL/Hr) IV Continuous <Continuous>  dextrose 50% Injectable 12.5 Gram(s) IV Push once  dextrose 50% Injectable 25 Gram(s) IV Push once  dextrose 50% Injectable 25 Gram(s) IV Push once  fentaNYL   Patch  12 MICROgram(s)/Hr 1 Patch Transdermal every 72 hours  ferrous    sulfate Liquid 300 milliGRAM(s) Enteral Tube daily  furosemide    Tablet 40 milliGRAM(s) Oral daily  gabapentin   Solution 300 milliGRAM(s) Oral two times a day  insulin lispro (HumaLOG) corrective regimen sliding scale   SubCutaneous every 6 hours  lisinopril 5 milliGRAM(s) Oral daily  minocycline 100 milliGRAM(s) Oral two times a day  mirtazapine 7.5 milliGRAM(s) Oral at bedtime  pantoprazole   Suspension 40 milliGRAM(s) Oral before breakfast  predniSONE   Tablet 7.5 milliGRAM(s) Oral <User Schedule>  QUEtiapine 25 milliGRAM(s) Oral at bedtime  sertraline 50 milliGRAM(s) Oral daily  tiotropium 18 MICROgram(s) Capsule 1 Capsule(s) Inhalation daily  tiZANidine 4 milliGRAM(s) Oral <User Schedule>    MEDICATIONS  (PRN):  acetaminophen    Suspension .. 680 milliGRAM(s) Oral every 6 hours PRN Temp greater or equal to 38C (100.4F), Mild Pain (1 - 3)  dextrose 40% Gel 15 Gram(s) Oral once PRN Blood Glucose LESS THAN 70 milliGRAM(s)/deciliter  glucagon  Injectable 1 milliGRAM(s) IntraMuscular once PRN Glucose LESS THAN 70 milligrams/deciliter  nystatin Powder 1 Application(s) Topical two times a day PRN rash/itchiness    Pertinent Labs: 12-10 @ 06:42: Na 145, BUN 38<H>, Cr 0.52, <H>, K+ 3.6, Phos --, Mg --, Alk Phos --, ALT/SGPT --, AST/SGOT --, HbA1c --    Finger Sticks:  POCT Blood Glucose.: 112 mg/dL (12-10 @ 06:01)  POCT Blood Glucose.: 137 mg/dL (12-10 @ 00:21)  POCT Blood Glucose.: 153 mg/dL (12-09 @ 17:21)      Skin per nursing documentation:  unstageable sacrum, stage 2 sacrum  Edema: +1 right leg, +2 left leg     Estimated Needs:   [ x] no change since previous assessment  [ ] recalculated:     Previous Nutrition Diagnosis:  Increased nutrient needs (specify)  Nutrition Diagnosis is: on going    New Nutrition Diagnosis: N/A       Interventions:   Recommend  1) Recommend continue bolus feeds of Vital 1.2 AF @ 200ml q 4hs (5 feeds per day from 8am-10pm). Current regimen provides 1000ml total volume, 1200kcal (26Kcal/Kg) and 75g (1.65g/Kg) protein and 811ml free water based on current dosing wt of 45.4lbs. Defer additional free water to team. Current regimen providing 811ml free water.  2) Consider adding  Banatrol Plus 2 packets to per day given report of frequent loose stools.   3) Monitor tolerance to EN and adjust as needed.     Monitoring and Evaluation:     Continue to monitor Nutritional intake, Tolerance to diet prescription, weights, labs, skin integrity  Shasha Hare RD, CDN, Pager # [phone]   RD remains available upon request and will follow up per protocol

## 2018-12-11 LAB
-  AMIKACIN: SIGNIFICANT CHANGE UP
-  AZTREONAM: SIGNIFICANT CHANGE UP
-  CEFEPIME: SIGNIFICANT CHANGE UP
-  CEFTAZIDIME: SIGNIFICANT CHANGE UP
-  CIPROFLOXACIN: SIGNIFICANT CHANGE UP
-  GENTAMICIN: SIGNIFICANT CHANGE UP
-  IMIPENEM: SIGNIFICANT CHANGE UP
-  LEVOFLOXACIN: SIGNIFICANT CHANGE UP
-  MEROPENEM: SIGNIFICANT CHANGE UP
-  PIPERACILLIN/TAZOBACTAM: SIGNIFICANT CHANGE UP
-  TOBRAMYCIN: SIGNIFICANT CHANGE UP
ANION GAP SERPL CALC-SCNC: 14 MMOL/L — SIGNIFICANT CHANGE UP (ref 5–17)
BUN SERPL-MCNC: 40 MG/DL — HIGH (ref 7–23)
CALCIUM SERPL-MCNC: 9.1 MG/DL — SIGNIFICANT CHANGE UP (ref 8.4–10.5)
CHLORIDE SERPL-SCNC: 98 MMOL/L — SIGNIFICANT CHANGE UP (ref 96–108)
CO2 SERPL-SCNC: 29 MMOL/L — SIGNIFICANT CHANGE UP (ref 22–31)
CREAT SERPL-MCNC: 0.52 MG/DL — SIGNIFICANT CHANGE UP (ref 0.5–1.3)
CULTURE RESULTS: SIGNIFICANT CHANGE UP
GLUCOSE BLDC GLUCOMTR-MCNC: 115 MG/DL — HIGH (ref 70–99)
GLUCOSE BLDC GLUCOMTR-MCNC: 121 MG/DL — HIGH (ref 70–99)
GLUCOSE BLDC GLUCOMTR-MCNC: 126 MG/DL — HIGH (ref 70–99)
GLUCOSE BLDC GLUCOMTR-MCNC: 135 MG/DL — HIGH (ref 70–99)
GLUCOSE BLDC GLUCOMTR-MCNC: 141 MG/DL — HIGH (ref 70–99)
GLUCOSE BLDC GLUCOMTR-MCNC: 149 MG/DL — HIGH (ref 70–99)
GLUCOSE SERPL-MCNC: 125 MG/DL — HIGH (ref 70–99)
HCT VFR BLD CALC: 33.3 % — LOW (ref 34.5–45)
HGB BLD-MCNC: 10.5 G/DL — LOW (ref 11.5–15.5)
MCHC RBC-ENTMCNC: 30.8 PG — SIGNIFICANT CHANGE UP (ref 27–34)
MCHC RBC-ENTMCNC: 31.5 GM/DL — LOW (ref 32–36)
MCV RBC AUTO: 97.7 FL — SIGNIFICANT CHANGE UP (ref 80–100)
METHOD TYPE: SIGNIFICANT CHANGE UP
ORGANISM # SPEC MICROSCOPIC CNT: SIGNIFICANT CHANGE UP
ORGANISM # SPEC MICROSCOPIC CNT: SIGNIFICANT CHANGE UP
PLATELET # BLD AUTO: 272 K/UL — SIGNIFICANT CHANGE UP (ref 150–400)
POTASSIUM SERPL-MCNC: 3.8 MMOL/L — SIGNIFICANT CHANGE UP (ref 3.5–5.3)
POTASSIUM SERPL-SCNC: 3.8 MMOL/L — SIGNIFICANT CHANGE UP (ref 3.5–5.3)
RBC # BLD: 3.41 M/UL — LOW (ref 3.8–5.2)
RBC # FLD: 17.5 % — HIGH (ref 10.3–14.5)
SODIUM SERPL-SCNC: 141 MMOL/L — SIGNIFICANT CHANGE UP (ref 135–145)
SPECIMEN SOURCE: SIGNIFICANT CHANGE UP
WBC # BLD: 8.93 K/UL — SIGNIFICANT CHANGE UP (ref 3.8–10.5)
WBC # FLD AUTO: 8.93 K/UL — SIGNIFICANT CHANGE UP (ref 3.8–10.5)

## 2018-12-11 PROCEDURE — 99233 SBSQ HOSP IP/OBS HIGH 50: CPT

## 2018-12-11 RX ORDER — ACETAMINOPHEN 500 MG
325 TABLET ORAL ONCE
Qty: 0 | Refills: 0 | Status: COMPLETED | OUTPATIENT
Start: 2018-12-11 | End: 2018-12-11

## 2018-12-11 RX ADMIN — CEFTOLOZANE AND TAZOBACTAM 100 GRAM(S): 1; .5 INJECTION, POWDER, LYOPHILIZED, FOR SOLUTION INTRAVENOUS at 06:08

## 2018-12-11 RX ADMIN — TIOTROPIUM BROMIDE 1 CAPSULE(S): 18 CAPSULE ORAL; RESPIRATORY (INHALATION) at 12:58

## 2018-12-11 RX ADMIN — Medication 680 MILLIGRAM(S): at 02:01

## 2018-12-11 RX ADMIN — TIZANIDINE 4 MILLIGRAM(S): 4 TABLET ORAL at 09:04

## 2018-12-11 RX ADMIN — Medication 325 MILLIGRAM(S): at 04:01

## 2018-12-11 RX ADMIN — MIRTAZAPINE 7.5 MILLIGRAM(S): 45 TABLET, ORALLY DISINTEGRATING ORAL at 21:15

## 2018-12-11 RX ADMIN — CEFTOLOZANE AND TAZOBACTAM 100 GRAM(S): 1; .5 INJECTION, POWDER, LYOPHILIZED, FOR SOLUTION INTRAVENOUS at 21:16

## 2018-12-11 RX ADMIN — TIZANIDINE 4 MILLIGRAM(S): 4 TABLET ORAL at 21:16

## 2018-12-11 RX ADMIN — CLOPIDOGREL BISULFATE 75 MILLIGRAM(S): 75 TABLET, FILM COATED ORAL at 12:57

## 2018-12-11 RX ADMIN — Medication 3 MILLILITER(S): at 18:27

## 2018-12-11 RX ADMIN — Medication 1.5 MILLIGRAM(S): at 21:15

## 2018-12-11 RX ADMIN — QUETIAPINE FUMARATE 25 MILLIGRAM(S): 200 TABLET, FILM COATED ORAL at 21:16

## 2018-12-11 RX ADMIN — Medication 7.5 MILLIGRAM(S): at 09:04

## 2018-12-11 RX ADMIN — Medication 3 MILLILITER(S): at 12:58

## 2018-12-11 RX ADMIN — Medication 1 APPLICATION(S): at 21:16

## 2018-12-11 RX ADMIN — FENTANYL CITRATE 1 PATCH: 50 INJECTION INTRAVENOUS at 07:52

## 2018-12-11 RX ADMIN — Medication 3 MILLILITER(S): at 06:12

## 2018-12-11 RX ADMIN — PANTOPRAZOLE SODIUM 40 MILLIGRAM(S): 20 TABLET, DELAYED RELEASE ORAL at 06:12

## 2018-12-11 RX ADMIN — BUDESONIDE AND FORMOTEROL FUMARATE DIHYDRATE 2 PUFF(S): 160; 4.5 AEROSOL RESPIRATORY (INHALATION) at 18:27

## 2018-12-11 RX ADMIN — Medication 1 APPLICATION(S): at 06:08

## 2018-12-11 RX ADMIN — Medication 1 APPLICATION(S): at 16:51

## 2018-12-11 RX ADMIN — SERTRALINE 50 MILLIGRAM(S): 25 TABLET, FILM COATED ORAL at 12:58

## 2018-12-11 RX ADMIN — MINOCYCLINE HYDROCHLORIDE 100 MILLIGRAM(S): 45 TABLET, EXTENDED RELEASE ORAL at 06:12

## 2018-12-11 RX ADMIN — LISINOPRIL 5 MILLIGRAM(S): 2.5 TABLET ORAL at 06:12

## 2018-12-11 RX ADMIN — CEFTOLOZANE AND TAZOBACTAM 100 GRAM(S): 1; .5 INJECTION, POWDER, LYOPHILIZED, FOR SOLUTION INTRAVENOUS at 14:15

## 2018-12-11 RX ADMIN — FENTANYL CITRATE 1 PATCH: 50 INJECTION INTRAVENOUS at 19:43

## 2018-12-11 RX ADMIN — Medication 1 APPLICATION(S): at 01:00

## 2018-12-11 RX ADMIN — Medication 40 MILLIGRAM(S): at 06:12

## 2018-12-11 RX ADMIN — GABAPENTIN 300 MILLIGRAM(S): 400 CAPSULE ORAL at 06:12

## 2018-12-11 RX ADMIN — Medication 300 MILLIGRAM(S): at 12:57

## 2018-12-11 NOTE — PROGRESS NOTE ADULT - ASSESSMENT
69 yo female with UC, dementia, prior prolonged hosp stay, returns for CHF management  On MCN suppression for prior R hip MRSA infection- spacer; wound clean  Recent Tx for aspiration pneumonia  Prior drug rash resolved  MDR Pseudomonas in urine, longstanding gavin  Fever since 11/08, covered with Zerbaxa, trending down  Bld Cxs negative  WBC normal, Creat normal  CXR likely nonspecific  CAUTI remains leading concern    Suggest:  Continue Zerbaxa   Micro to check urine sensi, but I suspect will be active  Follow temps and CBC/diff   Continue  per peg BID for MRSA suppression  D/w NP

## 2018-12-11 NOTE — PROGRESS NOTE ADULT - ASSESSMENT
fever   pneumonia   chronic indwelling catheter   depression   anxiety   chronic pain   asthma medication induced hyperglycemia       at this point will continue antibiotics   continues medication for her depression   anxiety and dementia    also continue budenoside as well     will need patient to be 24 hours free of fever prior to dc   wound care ongoing for the sacral decubitus

## 2018-12-11 NOTE — CHART NOTE - NSCHARTNOTEFT_GEN_A_CORE
MEDICINE [facility]  Patient is a 68 year old female who presents with a chief complaint of elevated proBNP. (10 Dec 2018 16:20)      Event Summary:   Notified by RN patient with fever - temperature of 100.8F.  Patient seen and examined at the bedside.  Patient is alert - non-verbal at baseline.  Unable to contribute to ROS.      VITAL SIGNS:  T(C): 38.2 (12-11-18 @ 03:21), Max: 38.2 (12-11-18 @ 00:34)  T(F): 100.8 (12-11-18 @ 03:21), Max: 100.8 (12-11-18 @ 02:15)  HR: 92 (12-11-18 @ 04:05) (92 - 97)  BP: 105/61 (12-11-18 @ 04:05) (105/61 - 134/76)  RR: 19 (12-11-18 @ 04:05) (18 - 19)  SpO2: 92% (12-11-18 @ 04:05) (92% - 97%)        LABORATORY:    CBC:                      10.3   9.83  )-----------( 262      ( 10 Dec 2018 07:54 )             32.6       CHEM:  145  |  103  |  38<H>  ----------------------------<  103<H>  3.6   |  28  |  0.52    Ca    9.0      10 Dec 2018 06:42      MICROBIOLOGY:     URINE CULTURE:  Culture - Urine (12.09.18 @ 06:23)    Specimen Source: .Urine Clean Catch (Midstream)    Culture Results:   >100,000 CFU/ml Pseudomonas aeruginosa    BLOOD CULTURE:  Culture - Blood (12.09.18 @ 06:57)    Specimen Source: .Blood Blood-Peripheral    Culture Results:   No growth to date.    Culture - Blood (12.09.18 @ 06:57)    Specimen Source: .Blood Blood-Peripheral    Culture Results:   No growth to date.      RADIOLOGY:  Chest x-ray (12/8/18): Chronic fibrotic lung changes versus mild interstitial edema.  No right lung focal infiltrates.  Interval increase in left pleural effusion (c/w 12/2/18 plain chest); left basal atelectasis and/or pneumonia as above.      PHYSICAL EXAM:  GENERAL: Laying down, in no acute distress  HEART: +S1/S2.  Regular rate and rhythm.  No murmurs, rubs or gallops  CHEST/LUNG: Breath sounds clear to auscultation bilaterally.  No wheezes, rales, rhonchi or crackles  ABDOMEN: Bowel sounds present in all 4 quadrants.  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 (02 Dec 2018 18:39)  Now with fever.      PLAN: Fever  - Continue with anti-pyretic measures  - Blood cultures x2 from 12/9/18 negative to date  - Repeat blood cultures x2 ordered.  Follow up with results  - Urine culture from 12/9/18 with >100,000 CFU/ml Pseudomonas aeruginosa  - Repeat urine culture ordered.  Follow up with results  - Chest x-ray from 12/8/18 with questionable pneumonia on left lung  - Continue with IV Zerbaxa and PO Minocycline  - Infectious disease following patient  - Will continue to monitor closely  - Primary team to follow up in AM      #[phone]  Willow Bingham PA-C  Medicine Department

## 2018-12-11 NOTE — PROGRESS NOTE ADULT - SUBJECTIVE AND OBJECTIVE BOX
---___---___---___---___---___---___ ---___---___---___---___---___---___---___---___---___---                    <<<  M E D I C A L   A T T E N D I N G    F O L L O W    U P   N O T E  >>>    - Patient seen and examined by me approximately thirty minutes ago.  - In summary, patient is a 68y year old Female who origianlly presented with elevated bnp now resolved but has new onset fever   - Patient today overall doing ok, comfortable, eating OK.     ---___---___---___---___---___---      <<<  MEDICATIONS:  >>>    MEDICATIONS  (STANDING):  ALBUTerol/ipratropium for Nebulization 3 milliLiter(s) Nebulizer every 6 hours  AQUAPHOR (petrolatum Ointment) 1 Application(s) Topical three times a day  buDESOnide 160 MICROgram(s)/formoterol 4.5 MICROgram(s) Inhaler 2 Puff(s) Inhalation two times a day  ceftolozane/tazobactam IVPB 1.5 Gram(s) IV Intermittent every 8 hours  clobetasol 0.05% Ointment 1 Application(s) Topical two times a day  clonazePAM Tablet 1.5 milliGRAM(s) Oral at bedtime  clopidogrel Tablet 75 milliGRAM(s) Oral daily  dextrose 5%. 1000 milliLiter(s) (50 mL/Hr) IV Continuous <Continuous>  dextrose 50% Injectable 12.5 Gram(s) IV Push once  dextrose 50% Injectable 25 Gram(s) IV Push once  dextrose 50% Injectable 25 Gram(s) IV Push once  fentaNYL   Patch  12 MICROgram(s)/Hr 1 Patch Transdermal every 72 hours  ferrous    sulfate Liquid 300 milliGRAM(s) Enteral Tube daily  furosemide    Tablet 40 milliGRAM(s) Oral daily  gabapentin   Solution 300 milliGRAM(s) Oral two times a day  insulin lispro (HumaLOG) corrective regimen sliding scale   SubCutaneous every 6 hours  lisinopril 5 milliGRAM(s) Oral daily  medical marijuana oil 1 milliLiter(s),medical marijuana oil 1 milliLiter(s) 1 milliLiter(s) SubLingual <User Schedule>  minocycline 100 milliGRAM(s) Oral two times a day  mirtazapine 7.5 milliGRAM(s) Oral at bedtime  pantoprazole   Suspension 40 milliGRAM(s) Oral before breakfast  predniSONE   Tablet 7.5 milliGRAM(s) Oral <User Schedule>  QUEtiapine 25 milliGRAM(s) Oral at bedtime  sertraline 50 milliGRAM(s) Oral daily  tiotropium 18 MICROgram(s) Capsule 1 Capsule(s) Inhalation daily  tiZANidine 4 milliGRAM(s) Oral <User Schedule>      MEDICATIONS  (PRN):  acetaminophen    Suspension .. 680 milliGRAM(s) Oral every 6 hours PRN Temp greater or equal to 38C (100.4F), Mild Pain (1 - 3)  dextrose 40% Gel 15 Gram(s) Oral once PRN Blood Glucose LESS THAN 70 milliGRAM(s)/deciliter  glucagon  Injectable 1 milliGRAM(s) IntraMuscular once PRN Glucose LESS THAN 70 milligrams/deciliter  nystatin Powder 1 Application(s) Topical two times a day PRN rash/itchiness       ---___---___---___---___---___---     <<<REVIEW OF SYSTEM: >>>    GEN:  fever, no chills, no pain  RESP: no SOB, no cough, no sputum  CVS: no chest pain, no palpitations, no edema  GI: no abdominal pain, no nausea, no vomiting, no constipation, no diarrhea  : no dysurea, no frequency  NEURO: no headache, no dizziness  PSYCH: no depression, not anxious  Derm : no itching, no rash     ---___---___---___---___---___---          <<<  VITAL SIGNS: >>>    T(F): 99.9 (12-11-18 @ 06:13), Max: 100.8 (12-11-18 @ 02:15)  HR: 92 (12-11-18 @ 04:05) (92 - 97)  BP: 105/61 (12-11-18 @ 04:05) (105/61 - 111/66)  RR: 19 (12-11-18 @ 04:05) (18 - 19)  SpO2: 92% (12-11-18 @ 04:05) (92% - 97%)  Wt(kg): --  CAPILLARY BLOOD GLUCOSE      POCT Blood Glucose.: 149 mg/dL (11 Dec 2018 11:52)    I&O's Summary    10 Dec 2018 07:01  -  11 Dec 2018 07:00  --------------------------------------------------------  IN: 1650 mL / OUT: 1800 mL / NET: -150 mL    11 Dec 2018 07:01  -  11 Dec 2018 15:16  --------------------------------------------------------  IN: 0 mL / OUT: 1000 mL / NET: -1000 mL         ---___---___---___---___---___---                       PHYSICAL EXAM:    GEN: A&O X 3 , NAD , comfortable nonverbal   HEENT: NCAT, PERRL, MMM, no scleral icterus, hearing intact  NECK: Supple, No JVD  CVS: S1S2 , regular , No M/R/G appreciated  PULM: CTA B/L,  no W/R/R appreciated  ABD.: soft. non tender, non distended,  bowel sounds present (+) peg   Extrem: intact pulses , no edema noted  Derm: No rash or ecchymosis noted sacral ulcer noted   PSYCH: normal mood, no depression, not anxious     ---___---___---___---___---___---     <<<  LAB AND IMAGING: >>>                          10.5   8.93  )-----------( 272      ( 11 Dec 2018 08:00 )             33.3               12-11    141  |  98  |  40<H>  ----------------------------<  125<H>  3.8   |  29  |  0.52    Ca    9.1      11 Dec 2018 06:21                                 [All pertinent / recent available Imaging reports and other labs reviewed]     ---___---___---___---___---___---___ ---___---___---___---___---           <<<  A S S E S S M E N T   A N D   P L A N :  >>>          -GI/DVT Prophylaxis.    --------------------------------------------  Case discussed with   Education given on     >>______________________<<      Deniz Bolden .         phone   [phone]

## 2018-12-11 NOTE — PROVIDER CONTACT NOTE (OTHER) - BACKGROUND
Pt admitted for CHF exacerbation. Urine cx (+) for PSA. Currently on Zerbaxa for UTI and minocycline PO for MRSA suppression from a recent infection. Bld cx from 12/9 (-).

## 2018-12-11 NOTE — PROGRESS NOTE ADULT - SUBJECTIVE AND OBJECTIVE BOX
CC: f/u for MRSA suppression and fever    Patient comfortable, no distress, tolerating tube feeds; denies pain    REVIEW OF SYSTEMS:  All other review of systems negative (Comprehensive ROS)    Antimicrobials Day # 3   ceftolozane/tazobactam IVPB 1.5 Gram(s) IV Intermittent every 8 hours  minocycline 100 milliGRAM(s) Oral two times a day    Other Medications Reviewed    Vital Signs Last 24 Hrs  T(F): 99.9 (11 Dec 2018 06:13), Max: 100.8 (11 Dec 2018 02:15)  HR: 92 (11 Dec 2018 04:05) (92 - 97)  BP: 105/61 (11 Dec 2018 04:05) (105/61 - 111/66)  BP(mean): --  RR: 19 (11 Dec 2018 04:05) (18 - 19)  SpO2: 92% (11 Dec 2018 04:05) (92% - 97%)    PHYSICAL EXAM:  General: no acute distress  Eyes:  anicteric, no conjunctival injection, no discharge  Oropharynx: no lesions or injection 	  Neck: supple, without adenopathy  Lungs: clear to auscultation anteriorly  Heart: irregular rhythm; no murmur, rubs or gallops  Abdomen: soft, nondistended, nontender, G tube site clean  Blackman  Skin: no rash  Extremities: R thigh incision well healed  Neurologic: alert, confused, moves all extremities    LAB RESULTS:                        10.5   8.93  )-----------( 272      ( 11 Dec 2018 08:00 )             33.3   12-11    141  |  98  |  40<H>  ----------------------------<  125<H>  3.8   |  29  |  0.52    Ca    9.1      11 Dec 2018 06:21    Urinalysis Basic - ( 09 Dec 2018 00:19 )    Color: Yellow / Appearance: Slightly Turbid / S.017 / pH: x  Gluc: x / Ketone: Negative  / Bili: Negative / Urobili: Negative   Blood: x / Protein: Trace / Nitrite: Negative   Leuk Esterase: Large / RBC: 1 /hpf /  /hpf   Sq Epi: x / Non Sq Epi: 1 /hpf / Bacteria: Many      MICROBIOLOGY:  RECENT CULTURES:  Culture - Urine (18 @ 06:23)    -  Amikacin: I 32    -  Aztreonam: I 16    -  Ceftazidime: S 8    -  Ciprofloxacin: R >2    -  Cefepime: I 16    -  Meropenem: R >8    -  Gentamicin: R >8    -  Piperacillin/Tazobactam: I 32    -  Imipenem: R >8    -  Levofloxacin: R >4    -  Tobramycin: S 4    Specimen Source: .Urine Clean Catch (Midstream)    Culture Results:   >100,000 CFU/ml Pseudomonas aeruginosa (Carbapenem Resistant)    Organism Identification: Pseudomonas aeruginosa (Carbapenem Resistant)    Organism: Pseudomonas aeruginosa (Carbapenem Resistant)    Method Type: BLANCA    Culture - Blood (18 @ 06:57)    Specimen Source: .Blood Blood-Peripheral    Culture Results:   No growth to date.    Culture - Blood (18 @ 14:59)    Specimen Source: .Blood Blood-Venous    Culture Results:   No growth at 5 days.    RADIOLOGY REVIEWED:  Xray Chest 1 View- PORTABLE-Urgent (18 @ 23:46) >  Chronic fibrotic lung changes versus mild interstitial edema.  No right lung focal infiltrates.  Interval increase in left pleural effusion (c/w 18 plain chest);   left basal atelectasis and/or pneumonia as above.

## 2018-12-11 NOTE — PROGRESS NOTE ADULT - ASSESSMENT
68 year-old woman with Alzheimer's dementia seen to have volume overload and biventricular congestive heart failure.  TTE on this admission shows reduced LVEF, elevated pulmonary pressures.    Continue Lasix 40 mg via PEG daily.  Keep O > I.  Keep K > 4.0 and Mag > 2.0.  Monitor BUN/Cr with daily labs until discharge.    Given severely reduced LVEF, plan to continue ACEi and uptitrate as blood pressure permits.  Will defer starting beta-blocker in this patient as she has baseline restrictive airway disease and is beta-agonist inhalers. Can reconsider low dose carvedilol as outpatient.    Patient with low grade fevers overnight - follow-up ID recommendations for antibiotics.  Discharge planning per primary team.

## 2018-12-11 NOTE — PROGRESS NOTE ADULT - SUBJECTIVE AND OBJECTIVE BOX
HPI:  Patient with low grade fevers again overnight.  Appears at baseline on exam.    Review Of Systems:     Patient unable to provide meaningful ROS in setting of dementia     Medications:  acetaminophen    Suspension .. 680 milliGRAM(s) Oral every 6 hours PRN  ALBUTerol/ipratropium for Nebulization 3 milliLiter(s) Nebulizer every 6 hours  AQUAPHOR (petrolatum Ointment) 1 Application(s) Topical three times a day  buDESOnide 160 MICROgram(s)/formoterol 4.5 MICROgram(s) Inhaler 2 Puff(s) Inhalation two times a day  ceftolozane/tazobactam IVPB 1.5 Gram(s) IV Intermittent every 8 hours  clobetasol 0.05% Ointment 1 Application(s) Topical two times a day  clonazePAM Tablet 1.5 milliGRAM(s) Oral at bedtime  clopidogrel Tablet 75 milliGRAM(s) Oral daily  dextrose 40% Gel 15 Gram(s) Oral once PRN  dextrose 5%. 1000 milliLiter(s) IV Continuous <Continuous>  dextrose 50% Injectable 12.5 Gram(s) IV Push once  dextrose 50% Injectable 25 Gram(s) IV Push once  dextrose 50% Injectable 25 Gram(s) IV Push once  fentaNYL   Patch  12 MICROgram(s)/Hr 1 Patch Transdermal every 72 hours  ferrous    sulfate Liquid 300 milliGRAM(s) Enteral Tube daily  furosemide    Tablet 40 milliGRAM(s) Oral daily  gabapentin   Solution 300 milliGRAM(s) Oral two times a day  glucagon  Injectable 1 milliGRAM(s) IntraMuscular once PRN  insulin lispro (HumaLOG) corrective regimen sliding scale   SubCutaneous every 6 hours  lisinopril 5 milliGRAM(s) Oral daily  medical marijuana oil 1 milliLiter(s),medical marijuana oil 1 milliLiter(s) 1 milliLiter(s) SubLingual <User Schedule>  minocycline 100 milliGRAM(s) Oral two times a day  mirtazapine 7.5 milliGRAM(s) Oral at bedtime  nystatin Powder 1 Application(s) Topical two times a day PRN  pantoprazole   Suspension 40 milliGRAM(s) Oral before breakfast  predniSONE   Tablet 7.5 milliGRAM(s) Oral <User Schedule>  QUEtiapine 25 milliGRAM(s) Oral at bedtime  sertraline 50 milliGRAM(s) Oral daily  tiotropium 18 MICROgram(s) Capsule 1 Capsule(s) Inhalation daily  tiZANidine 4 milliGRAM(s) Oral <User Schedule>    PAST MEDICAL & SURGICAL HISTORY:  CVA (cerebral vascular accident)  Fatty pancreas  PNA (pneumonia)  Pulmonary HTN  IGT (impaired glucose tolerance)  Ulcerative colitis  Acid reflux  Anxiety  Depression  Mouth sores  HLD (hyperlipidemia)  Asthma  Humeral head fracture  H/O: hysterectomy  H/O cataract extraction, left  History of knee replacement    Vitals:  T(C): 37.7 (12-11-18 @ 06:13), Max: 38.2 (12-11-18 @ 00:34)  HR: 92 (12-11-18 @ 04:05) (92 - 97)  BP: 105/61 (12-11-18 @ 04:05) (105/61 - 134/76)  BP(mean): --  RR: 19 (12-11-18 @ 04:05) (18 - 19)  SpO2: 92% (12-11-18 @ 04:05) (92% - 97%)  Wt(kg): --  Daily     Daily   I&O's Summary    10 Dec 2018 07:01  -  11 Dec 2018 07:00  --------------------------------------------------------  IN: 1650 mL / OUT: 1800 mL / NET: -150 mL        Physical Exam:  Appearance: appears older than stated age; bedbound, demented; no acute distress  Eyes: PERRL, EOMI, pink conjunctiva  HENT: Normal oral mucosa  Cardiovascular: RRR, S1, S2; trace LE edema bilaterally; +JVD  Respiratory: fine bibasilar rales  Gastrointestinal: soft, non-tender, non-distended with normal bowel sounds; +PEG  Musculoskeletal: Bedbound, contracted  Neurologic: cranial nerves grossly intact  Lymphatic: No lymphadenopathy  Psychiatry: awake and alert  Skin: No rashes, ecchymoses, or cyanosis                          10.5   8.93  )-----------( 272      ( 11 Dec 2018 08:00 )             33.3     12-11    141  |  98  |  40<H>  ----------------------------<  125<H>  3.8   |  29  |  0.52    Ca    9.1      11 Dec 2018 06:21            Echo: < from: Transthoracic Echocardiogram (12.03.18 @ 11:23) >  ------------------------------------------------------------------------  Conclusions:  1. Mitral annular calcification, otherwise normal mitral  valve. Moderate-severe mitral regurgitation (vena contracta  0.8 cm)  2. Calcified trileafletaortic valve with normal opening.  Peak transaortic valve gradient equals 3 mm Hg, mean  transaortic valve gradient equals 1 mm Hg, aortic valve  velocity time integral equals 13 cm. Moderate aortic  regurgitation  3. Moderately dilated left atrium.LA volume index = 45  cc/m2.  4. Severe global left ventricular systolic dysfunction.  5. Increased E/e'  is consistent with elevated left  ventricular filling pressure.  6. Moderate right atrial enlargement. Inferior vena cava is  dilated (>=2.5cm) with normal respiratory variability  consistent with right atrial pressure 16-20mm Hg.  7. Right ventricular enlargement with decreased right  ventricular systolic function.  8. Normal tricuspid valve. Severe tricuspid regurgitation.  9. Estimated pulmonary artery systolic pressure equals 82  mm Hg, assuming right atrial pressure equals 8 mm Hg,  consistent with severe pulmonary pressures.  10. Color Doppler demonstrates evidence of left to right  shunt  ------------------------------------------------------------------------  Confirmed on  12/3/2018 - 16:37:07 by Margaret Khan M.D.  ------------------------------------------------------------------------    < end of copied text >    Imaging: < from: Xray Chest 1 View- PORTABLE-Urgent (12.02.18 @ 17:19) >  IMPRESSION:   Limited study due to patient positioning.  Trace right pleural effusion and likely left pleural effusion. Question   mild pulmonary edema.    HUAN JACOBS M.D., RADIOLOGY RESIDENT  This document has been electronically signed.  TONYA AARON M.D., ATTENDING RADIOLOGIST  This document has been electronically signed. Dec  2 2018  9:17PM      < end of copied text >    Interpretation of Telemetry: sinus rhythm

## 2018-12-12 DIAGNOSIS — J18.9 PNEUMONIA, UNSPECIFIED ORGANISM: ICD-10-CM

## 2018-12-12 LAB
-  CEFTOLOZANE/TAZOBACTAM: SIGNIFICANT CHANGE UP
ANION GAP SERPL CALC-SCNC: 15 MMOL/L — SIGNIFICANT CHANGE UP (ref 5–17)
BASOPHILS # BLD AUTO: 0.05 K/UL — SIGNIFICANT CHANGE UP (ref 0–0.2)
BASOPHILS NFR BLD AUTO: 0.6 % — SIGNIFICANT CHANGE UP (ref 0–2)
BUN SERPL-MCNC: 37 MG/DL — HIGH (ref 7–23)
CALCIUM SERPL-MCNC: 8.9 MG/DL — SIGNIFICANT CHANGE UP (ref 8.4–10.5)
CHLORIDE SERPL-SCNC: 100 MMOL/L — SIGNIFICANT CHANGE UP (ref 96–108)
CO2 SERPL-SCNC: 25 MMOL/L — SIGNIFICANT CHANGE UP (ref 22–31)
CREAT SERPL-MCNC: 0.47 MG/DL — LOW (ref 0.5–1.3)
EOSINOPHIL # BLD AUTO: 0.7 K/UL — HIGH (ref 0–0.5)
EOSINOPHIL NFR BLD AUTO: 8 % — HIGH (ref 0–6)
GLUCOSE BLDC GLUCOMTR-MCNC: 141 MG/DL — HIGH (ref 70–99)
GLUCOSE BLDC GLUCOMTR-MCNC: 149 MG/DL — HIGH (ref 70–99)
GLUCOSE BLDC GLUCOMTR-MCNC: 157 MG/DL — HIGH (ref 70–99)
GLUCOSE BLDC GLUCOMTR-MCNC: 159 MG/DL — HIGH (ref 70–99)
GLUCOSE SERPL-MCNC: 214 MG/DL — HIGH (ref 70–99)
HCT VFR BLD CALC: 31.7 % — LOW (ref 34.5–45)
HGB BLD-MCNC: 10.1 G/DL — LOW (ref 11.5–15.5)
IMM GRANULOCYTES NFR BLD AUTO: 0.1 % — SIGNIFICANT CHANGE UP (ref 0–1.5)
LYMPHOCYTES # BLD AUTO: 1.22 K/UL — SIGNIFICANT CHANGE UP (ref 1–3.3)
LYMPHOCYTES # BLD AUTO: 14 % — SIGNIFICANT CHANGE UP (ref 13–44)
MCHC RBC-ENTMCNC: 29.9 PG — SIGNIFICANT CHANGE UP (ref 27–34)
MCHC RBC-ENTMCNC: 31.9 GM/DL — LOW (ref 32–36)
MCV RBC AUTO: 93.8 FL — SIGNIFICANT CHANGE UP (ref 80–100)
METHOD TYPE: SIGNIFICANT CHANGE UP
MONOCYTES # BLD AUTO: 0.69 K/UL — SIGNIFICANT CHANGE UP (ref 0–0.9)
MONOCYTES NFR BLD AUTO: 7.9 % — SIGNIFICANT CHANGE UP (ref 2–14)
NEUTROPHILS # BLD AUTO: 6.03 K/UL — SIGNIFICANT CHANGE UP (ref 1.8–7.4)
NEUTROPHILS NFR BLD AUTO: 69.4 % — SIGNIFICANT CHANGE UP (ref 43–77)
ORGANISM # SPEC MICROSCOPIC CNT: SIGNIFICANT CHANGE UP
ORGANISM # SPEC MICROSCOPIC CNT: SIGNIFICANT CHANGE UP
PLATELET # BLD AUTO: 289 K/UL — SIGNIFICANT CHANGE UP (ref 150–400)
POTASSIUM SERPL-MCNC: 3.5 MMOL/L — SIGNIFICANT CHANGE UP (ref 3.5–5.3)
POTASSIUM SERPL-SCNC: 3.5 MMOL/L — SIGNIFICANT CHANGE UP (ref 3.5–5.3)
RBC # BLD: 3.38 M/UL — LOW (ref 3.8–5.2)
RBC # FLD: 17.4 % — HIGH (ref 10.3–14.5)
SODIUM SERPL-SCNC: 140 MMOL/L — SIGNIFICANT CHANGE UP (ref 135–145)
WBC # BLD: 8.7 K/UL — SIGNIFICANT CHANGE UP (ref 3.8–10.5)
WBC # FLD AUTO: 8.7 K/UL — SIGNIFICANT CHANGE UP (ref 3.8–10.5)

## 2018-12-12 PROCEDURE — 99233 SBSQ HOSP IP/OBS HIGH 50: CPT

## 2018-12-12 RX ADMIN — QUETIAPINE FUMARATE 25 MILLIGRAM(S): 200 TABLET, FILM COATED ORAL at 22:24

## 2018-12-12 RX ADMIN — CEFTOLOZANE AND TAZOBACTAM 100 GRAM(S): 1; .5 INJECTION, POWDER, LYOPHILIZED, FOR SOLUTION INTRAVENOUS at 17:01

## 2018-12-12 RX ADMIN — FENTANYL CITRATE 1 PATCH: 50 INJECTION INTRAVENOUS at 07:00

## 2018-12-12 RX ADMIN — Medication 3 MILLILITER(S): at 23:54

## 2018-12-12 RX ADMIN — MINOCYCLINE HYDROCHLORIDE 100 MILLIGRAM(S): 45 TABLET, EXTENDED RELEASE ORAL at 17:42

## 2018-12-12 RX ADMIN — CLOPIDOGREL BISULFATE 75 MILLIGRAM(S): 75 TABLET, FILM COATED ORAL at 12:43

## 2018-12-12 RX ADMIN — FENTANYL CITRATE 1 PATCH: 50 INJECTION INTRAVENOUS at 19:19

## 2018-12-12 RX ADMIN — Medication 3 MILLILITER(S): at 05:11

## 2018-12-12 RX ADMIN — MINOCYCLINE HYDROCHLORIDE 100 MILLIGRAM(S): 45 TABLET, EXTENDED RELEASE ORAL at 05:12

## 2018-12-12 RX ADMIN — Medication 680 MILLIGRAM(S): at 22:12

## 2018-12-12 RX ADMIN — PANTOPRAZOLE SODIUM 40 MILLIGRAM(S): 20 TABLET, DELAYED RELEASE ORAL at 05:13

## 2018-12-12 RX ADMIN — Medication 1 APPLICATION(S): at 22:23

## 2018-12-12 RX ADMIN — CEFTOLOZANE AND TAZOBACTAM 100 GRAM(S): 1; .5 INJECTION, POWDER, LYOPHILIZED, FOR SOLUTION INTRAVENOUS at 05:12

## 2018-12-12 RX ADMIN — Medication 3 MILLILITER(S): at 00:04

## 2018-12-12 RX ADMIN — Medication 1 APPLICATION(S): at 05:11

## 2018-12-12 RX ADMIN — GABAPENTIN 300 MILLIGRAM(S): 400 CAPSULE ORAL at 17:42

## 2018-12-12 RX ADMIN — Medication 1: at 23:53

## 2018-12-12 RX ADMIN — BUDESONIDE AND FORMOTEROL FUMARATE DIHYDRATE 2 PUFF(S): 160; 4.5 AEROSOL RESPIRATORY (INHALATION) at 17:39

## 2018-12-12 RX ADMIN — Medication 680 MILLIGRAM(S): at 20:49

## 2018-12-12 RX ADMIN — CEFTOLOZANE AND TAZOBACTAM 100 GRAM(S): 1; .5 INJECTION, POWDER, LYOPHILIZED, FOR SOLUTION INTRAVENOUS at 22:24

## 2018-12-12 RX ADMIN — Medication 3 MILLILITER(S): at 12:42

## 2018-12-12 RX ADMIN — Medication 40 MILLIGRAM(S): at 05:12

## 2018-12-12 RX ADMIN — MIRTAZAPINE 7.5 MILLIGRAM(S): 45 TABLET, ORALLY DISINTEGRATING ORAL at 22:24

## 2018-12-12 RX ADMIN — Medication 300 MILLIGRAM(S): at 12:43

## 2018-12-12 RX ADMIN — TIZANIDINE 4 MILLIGRAM(S): 4 TABLET ORAL at 09:17

## 2018-12-12 RX ADMIN — SERTRALINE 50 MILLIGRAM(S): 25 TABLET, FILM COATED ORAL at 12:43

## 2018-12-12 RX ADMIN — Medication 1.5 MILLIGRAM(S): at 22:24

## 2018-12-12 RX ADMIN — FENTANYL CITRATE 1 PATCH: 50 INJECTION INTRAVENOUS at 17:38

## 2018-12-12 RX ADMIN — GABAPENTIN 300 MILLIGRAM(S): 400 CAPSULE ORAL at 05:10

## 2018-12-12 RX ADMIN — Medication 7.5 MILLIGRAM(S): at 09:17

## 2018-12-12 RX ADMIN — Medication 1 APPLICATION(S): at 17:39

## 2018-12-12 RX ADMIN — LISINOPRIL 5 MILLIGRAM(S): 2.5 TABLET ORAL at 05:12

## 2018-12-12 RX ADMIN — Medication 1 APPLICATION(S): at 15:34

## 2018-12-12 RX ADMIN — FENTANYL CITRATE 1 PATCH: 50 INJECTION INTRAVENOUS at 17:34

## 2018-12-12 RX ADMIN — TIOTROPIUM BROMIDE 1 CAPSULE(S): 18 CAPSULE ORAL; RESPIRATORY (INHALATION) at 12:43

## 2018-12-12 RX ADMIN — Medication 1: at 12:50

## 2018-12-12 RX ADMIN — BUDESONIDE AND FORMOTEROL FUMARATE DIHYDRATE 2 PUFF(S): 160; 4.5 AEROSOL RESPIRATORY (INHALATION) at 05:11

## 2018-12-12 RX ADMIN — Medication 3 MILLILITER(S): at 17:39

## 2018-12-12 RX ADMIN — TIZANIDINE 4 MILLIGRAM(S): 4 TABLET ORAL at 20:49

## 2018-12-12 NOTE — PROGRESS NOTE ADULT - SUBJECTIVE AND OBJECTIVE BOX
HPI:  Patient seen and examined with patient's  at bedside.  Afebrile x 24 hours since starting Zerbexa for MDR pseudomonas coverage per ID.    Review Of Systems:         Unable to obtain meaningful review of systems given baseline dementia - patient seen resting comfortably    Medications:  acetaminophen    Suspension .. 680 milliGRAM(s) Oral every 6 hours PRN  ALBUTerol/ipratropium for Nebulization 3 milliLiter(s) Nebulizer every 6 hours  AQUAPHOR (petrolatum Ointment) 1 Application(s) Topical three times a day  buDESOnide 160 MICROgram(s)/formoterol 4.5 MICROgram(s) Inhaler 2 Puff(s) Inhalation two times a day  ceftolozane/tazobactam IVPB 1.5 Gram(s) IV Intermittent every 8 hours  clobetasol 0.05% Ointment 1 Application(s) Topical two times a day  clonazePAM Tablet 1.5 milliGRAM(s) Oral at bedtime  clopidogrel Tablet 75 milliGRAM(s) Oral daily  dextrose 40% Gel 15 Gram(s) Oral once PRN  dextrose 5%. 1000 milliLiter(s) IV Continuous <Continuous>  dextrose 50% Injectable 12.5 Gram(s) IV Push once  dextrose 50% Injectable 25 Gram(s) IV Push once  dextrose 50% Injectable 25 Gram(s) IV Push once  fentaNYL   Patch  12 MICROgram(s)/Hr 1 Patch Transdermal every 72 hours  ferrous    sulfate Liquid 300 milliGRAM(s) Enteral Tube daily  furosemide    Tablet 40 milliGRAM(s) Oral daily  gabapentin   Solution 300 milliGRAM(s) Oral two times a day  glucagon  Injectable 1 milliGRAM(s) IntraMuscular once PRN  insulin lispro (HumaLOG) corrective regimen sliding scale   SubCutaneous every 6 hours  lisinopril 5 milliGRAM(s) Oral daily  medical marijuana oil 1 milliLiter(s),medical marijuana oil 1 milliLiter(s) 1 milliLiter(s) SubLingual <User Schedule>  minocycline 100 milliGRAM(s) Oral two times a day  mirtazapine 7.5 milliGRAM(s) Oral at bedtime  nystatin Powder 1 Application(s) Topical two times a day PRN  pantoprazole   Suspension 40 milliGRAM(s) Oral before breakfast  predniSONE   Tablet 7.5 milliGRAM(s) Oral <User Schedule>  QUEtiapine 25 milliGRAM(s) Oral at bedtime  sertraline 50 milliGRAM(s) Oral daily  tiotropium 18 MICROgram(s) Capsule 1 Capsule(s) Inhalation daily  tiZANidine 4 milliGRAM(s) Oral <User Schedule>    PAST MEDICAL & SURGICAL HISTORY:  CVA (cerebral vascular accident)  Fatty pancreas  PNA (pneumonia)  Pulmonary HTN  IGT (impaired glucose tolerance)  Ulcerative colitis  Acid reflux  Anxiety  Depression  Mouth sores  HLD (hyperlipidemia)  Asthma  Humeral head fracture  H/O: hysterectomy  H/O cataract extraction, left  History of knee replacement    Vitals:  T(C): 36.9 (18 @ 12:39), Max: 37.7 (18 @ 06:40)  HR: 89 (18 @ 12:39) (84 - 89)  BP: 102/63 (18 @ 12:39) (102/63 - 111/66)  BP(mean): --  RR: 18 (18 @ 12:39) (18 - 18)  SpO2: 96% (18 @ 12:39) (94% - 96%)  Wt(kg): --  Daily     Daily Weight in k.5 (12 Dec 2018 12:39)  I&O's Summary    11 Dec 2018 07:  -  12 Dec 2018 07:00  --------------------------------------------------------  IN: 690 mL / OUT: 2400 mL / NET: -1710 mL    12 Dec 2018 07:01  -  12 Dec 2018 21:08  --------------------------------------------------------  IN: 850 mL / OUT: 625 mL / NET: 225 mL        Physical Exam:  Appearance: appears older than stated age; bedbound, demented; no acute distress  Eyes: PERRL, EOMI, pink conjunctiva  HENT: Normal oral mucosa  Cardiovascular: RRR, S1, S2; no LE edema bilaterally; normal JVP  Respiratory: poor inspiratory effort  Gastrointestinal: soft, non-tender, non-distended with normal bowel sounds; +PEG  Musculoskeletal: Bedbound, contracted  Neurologic: cranial nerves grossly intact  Lymphatic: No lymphadenopathy  Psychiatry: awake and alert  Skin: No rashes, ecchymoses, or cyanosis                            10.1   8.70  )-----------( 289      ( 12 Dec 2018 07:05 )             31.7     12-    140  |  100  |  37<H>  ----------------------------<  214<H>  3.5   |  25  |  0.47<L>    Ca    8.9      12 Dec 2018 05:55            Echo: < from: Transthoracic Echocardiogram (18 @ 11:23) >  ------------------------------------------------------------------------  Conclusions:  1. Mitral annular calcification, otherwise normal mitral  valve. Moderate-severe mitral regurgitation (vena contracta  0.8 cm)  2. Calcified trileafletaortic valve with normal opening.  Peak transaortic valve gradient equals 3 mm Hg, mean  transaortic valve gradient equals 1 mm Hg, aortic valve  velocity time integral equals 13 cm. Moderate aortic  regurgitation  3. Moderately dilated left atrium.LA volume index = 45  cc/m2.  4. Severe global left ventricular systolic dysfunction.  5. Increased E/e'  is consistent with elevated left  ventricular filling pressure.  6. Moderate right atrial enlargement. Inferior vena cava is  dilated (>=2.5cm) with normal respiratory variability  consistent with right atrial pressure 16-20mm Hg.  7. Right ventricular enlargement with decreased right  ventricular systolic function.  8. Normal tricuspid valve. Severe tricuspid regurgitation.  9. Estimated pulmonary artery systolic pressure equals 82  mm Hg, assuming right atrial pressure equals 8 mm Hg,  consistent with severe pulmonary pressures.  10. Color Doppler demonstrates evidence of left to right  shunt  ------------------------------------------------------------------------  Confirmed on  12/3/2018 - 16:37:07 by Margaret Khan M.D.  ------------------------------------------------------------------------    < end of copied text >    Imaging: < from: Xray Chest 1 View- PORTABLE-Urgent (18 @ 17:19) >  IMPRESSION:   Limited study due to patient positioning.  Trace right pleural effusion and likely left pleural effusion. Question   mild pulmonary edema.    HUAN JACOBS M.D., RADIOLOGY RESIDENT  This document has been electronically signed.  TONYA AARON M.D., ATTENDING RADIOLOGIST  This document has been electronically signed. Dec  2 2018  9:17PM      < end of copied text >    Interpretation of Telemetry: sinus rhythm

## 2018-12-12 NOTE — CHART NOTE - NSCHARTNOTEFT_GEN_A_CORE
Nutrition Follow Up Note  Patient seen for: nutrition follow on 4MON    Chart reviewed, events noted. 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   Nutrition consulted at this time in setting of continued frequent BMs.    Source:  at bedside, RN, medical record, NP     Diet : NPO with enteral feeds     Enteral /Parenteral Nutrition: Pt receiving bolus feeds of Vital AF 1.2 @ 200ml q 4hs (5 feeds per day total). Current regimen provides 1000ml total volume, 1200kcal (26Kcal/Kg) and 75g (1.65g/Kg) protein and 811ml free water based on current dosing wt of 45.4lbs. Per , pt continues to have frequent liquidy stools. No abdominal pain noted. Last BM 12/12. Of note pt on antibiotics. Discussed addition of Banatrol with  to help promote bowel regularity. Will continue to monitor for improvement. If no improvement observed, consider increasing Banatrol as a trial or consider lowering TF until pt tolerates better.  notes pt with increased skin irritation due to frequent stools and is rightfully concerned that if skin continues to breakdown there may be increased risk for infection.      Weights: 91.4 lbs (12/12 wt calculator)  Large wt change from 12/5 noted. Pt noted with elevated weights from 1 month ago earlier on current admit, however now with wt noted today which appears more in line with weights from 11/2018 and is also closer to her dosing wt. Question accuracy of weight history and continue to monitor.    11/13: 105 lbs   11/14: 95.2 lbs  Dosing wt 99.88 lbs (12/4)  12/3: 123.8lbs   12/4: 122.5lbs   12/5: 122.1lbs     Pertinent Medications: MEDICATIONS  (STANDING):  ALBUTerol/ipratropium for Nebulization 3 milliLiter(s) Nebulizer every 6 hours  AQUAPHOR (petrolatum Ointment) 1 Application(s) Topical three times a day  buDESOnide 160 MICROgram(s)/formoterol 4.5 MICROgram(s) Inhaler 2 Puff(s) Inhalation two times a day  ceftolozane/tazobactam IVPB 1.5 Gram(s) IV Intermittent every 8 hours  clobetasol 0.05% Ointment 1 Application(s) Topical two times a day  clonazePAM Tablet 1.5 milliGRAM(s) Oral at bedtime  clopidogrel Tablet 75 milliGRAM(s) Oral daily  dextrose 5%. 1000 milliLiter(s) (50 mL/Hr) IV Continuous <Continuous>  dextrose 50% Injectable 12.5 Gram(s) IV Push once  dextrose 50% Injectable 25 Gram(s) IV Push once  dextrose 50% Injectable 25 Gram(s) IV Push once  fentaNYL   Patch  12 MICROgram(s)/Hr 1 Patch Transdermal every 72 hours  ferrous    sulfate Liquid 300 milliGRAM(s) Enteral Tube daily  furosemide    Tablet 40 milliGRAM(s) Oral daily  gabapentin   Solution 300 milliGRAM(s) Oral two times a day  insulin lispro (HumaLOG) corrective regimen sliding scale   SubCutaneous every 6 hours  lisinopril 5 milliGRAM(s) Oral daily  medical marijuana oil 1 milliLiter(s),medical marijuana oil 1 milliLiter(s) 1 milliLiter(s) SubLingual <User Schedule>  minocycline 100 milliGRAM(s) Oral two times a day  mirtazapine 7.5 milliGRAM(s) Oral at bedtime  pantoprazole   Suspension 40 milliGRAM(s) Oral before breakfast  predniSONE   Tablet 7.5 milliGRAM(s) Oral <User Schedule>  QUEtiapine 25 milliGRAM(s) Oral at bedtime  sertraline 50 milliGRAM(s) Oral daily  tiotropium 18 MICROgram(s) Capsule 1 Capsule(s) Inhalation daily  tiZANidine 4 milliGRAM(s) Oral <User Schedule>    MEDICATIONS  (PRN):  acetaminophen    Suspension .. 680 milliGRAM(s) Oral every 6 hours PRN Temp greater or equal to 38C (100.4F), Mild Pain (1 - 3)  dextrose 40% Gel 15 Gram(s) Oral once PRN Blood Glucose LESS THAN 70 milliGRAM(s)/deciliter  glucagon  Injectable 1 milliGRAM(s) IntraMuscular once PRN Glucose LESS THAN 70 milligrams/deciliter  nystatin Powder 1 Application(s) Topical two times a day PRN rash/itchiness    Pertinent Labs: 12-12 @ 05:55: Na 140, BUN 37<H>, Cr 0.47<L>, <H>, K+ 3.5, Phos --, Mg --, Alk Phos --, ALT/SGPT --, AST/SGOT --, HbA1c --    Finger Sticks:  POCT Blood Glucose.: 159 mg/dL (12-12 @ 12:47)  POCT Blood Glucose.: 141 mg/dL (12-12 @ 05:56)  POCT Blood Glucose.: 141 mg/dL (12-11 @ 23:52)  POCT Blood Glucose.: 135 mg/dL (12-11 @ 18:23)      Skin per nursing documentation:  unstageable sacrum, stage 2 sacrum  Edema: +1 right leg, +2 left leg     Estimated Needs:   [ x] no change since previous assessment  [ ] recalculated:     Previous Nutrition Diagnosis:  Increased nutrient needs (specify)  Nutrition Diagnosis is: on going    New Nutrition Diagnosis: N/A       Interventions:   Recommend  1) Recommend continue bolus feeds of Vital 1.2 AF @ 200ml q 4hs (5 feeds per day from 8am-10pm). Current regimen provides 1000ml total volume, 1200kcal (26Kcal/Kg) and 75g (1.65g/Kg) protein and 811ml free water based on current dosing wt of 45.4lbs. Defer additional free water to team. Current regimen providing 811ml free water.  2) Recommend  Banatrol Plus 2 packets per day given report of frequent loose stools.   3) Monitor tolerance to EN and adjust as needed.     Discussed with BENJI Villarreal.    Monitoring and Evaluation:     Continue to monitor Nutritional intake, Tolerance to diet prescription, weights, labs, skin integrity    RD remains available upon request and will follow up per protocol. Seema Blevins RD, CDN Pager: [phone]

## 2018-12-12 NOTE — PROGRESS NOTE ADULT - ASSESSMENT
fever possible pneumonia   dementia   chf   depression\  anxiety       on home dose medical marijuana for chronic pain

## 2018-12-12 NOTE — PROGRESS NOTE ADULT - SUBJECTIVE AND OBJECTIVE BOX
CC: f/u for MRSA suppression, fever, Pseudomonas UTI    Patient comfortable, no distress, tolerating tube feeds; denies pain    REVIEW OF SYSTEMS:  All other review of systems negative (Comprehensive ROS)    Antimicrobials Day # 4  ceftolozane/tazobactam IVPB 1.5 Gram(s) IV Intermittent every 8 hours  minocycline 100 milliGRAM(s) Oral two times a day    Other Medications Reviewed    Vital Signs Last 24 Hrs  T(F): 98.4 (12 Dec 2018 12:39), Max: 99.8 (12 Dec 2018 06:40)  HR: 89 (12 Dec 2018 12:39) (84 - 109)  BP: 102/63 (12 Dec 2018 12:39) (102/63 - 149/71)  BP(mean): --  RR: 18 (12 Dec 2018 12:39) (18 - 18)  SpO2: 96% (12 Dec 2018 12:39) (94% - 96%)    PHYSICAL EXAM:  General: no acute distress  Eyes:  anicteric, no conjunctival injection, no discharge  Oropharynx: no lesions or injection 	  Neck: supple, without adenopathy  Lungs: clear to auscultation anteriorly  Heart: irregular rhythm; no murmur, rubs or gallops  Abdomen: soft, nondistended, nontender, G tube site clean  Blackman  Skin: no rash  Extremities: R thigh incision well healed  Neurologic: alert, confused, moves all extremities    LAB RESULTS:                        10.1   8.70  )-----------( 289      ( 12 Dec 2018 07:05 )             31.7   12-12    140  |  100  |  37<H>  ----------------------------<  214<H>  3.5   |  25  |  0.47<L>    Ca    8.9      12 Dec 2018 05:55      Urinalysis Basic - ( 09 Dec 2018 00:19 )    Color: Yellow / Appearance: Slightly Turbid / S.017 / pH: x  Gluc: x / Ketone: Negative  / Bili: Negative / Urobili: Negative   Blood: x / Protein: Trace / Nitrite: Negative   Leuk Esterase: Large / RBC: 1 /hpf /  /hpf   Sq Epi: x / Non Sq Epi: 1 /hpf / Bacteria: Many      MICROBIOLOGY:  RECENT CULTURES:  Culture - Urine (12.09.18 @ 06:23)    -  Levofloxacin: R >4    -  Meropenem: R >8    -  Ceftazidime: S 8    -  Ceftolozane/tazobactam: S    -  Imipenem: R >8    -  Piperacillin/Tazobactam: I 32    -  Tobramycin: S 4    -  Amikacin: I 32    -  Aztreonam: I 16    -  Cefepime: I 16    -  Ciprofloxacin: R >2    -  Gentamicin: R >8    Specimen Source: .Urine Clean Catch (Midstream)    Culture Results:   >100,000 CFU/ml Pseudomonas aeruginosa (Carbapenem Resistant)    Organism Identification: Pseudomonas aeruginosa (Carbapenem Resistant)      Culture - Blood (18 @ 09:37)    Specimen Source: .Blood Blood    Culture Results:   No growth to date.    Culture - Blood (18 @ 06:57)    Specimen Source: .Blood Blood-Peripheral    Culture Results:   No growth to date.    Culture - Blood (18 @ 14:59)    Specimen Source: .Blood Blood-Venous    Culture Results:   No growth at 5 days.    RADIOLOGY REVIEWED:  Xray Chest 1 View- PORTABLE-Urgent (18 @ 23:46) >  Chronic fibrotic lung changes versus mild interstitial edema.  No right lung focal infiltrates.  Interval increase in left pleural effusion (c/w 18 plain chest);   left basal atelectasis and/or pneumonia as above.

## 2018-12-12 NOTE — PROGRESS NOTE ADULT - SUBJECTIVE AND OBJECTIVE BOX
---___---___---___---___---___---___ ---___---___---___---___---___---___---___---___---___---                    <<<  M E D I C A L   A T T E N D I N G    F O L L O W    U P   N O T E  >>>    - Patient seen and examined by me approximately thirty minutes ago.  - In summary, patient is a 68y year old Female who origianlly presented now with elevated temp and on iv antibiotics   - Patient today overall doing ok, comfortable, eating OK.     ---___---___---___---___---___---      <<<  MEDICATIONS:  >>>    MEDICATIONS  (STANDING):  ALBUTerol/ipratropium for Nebulization 3 milliLiter(s) Nebulizer every 6 hours  AQUAPHOR (petrolatum Ointment) 1 Application(s) Topical three times a day  buDESOnide 160 MICROgram(s)/formoterol 4.5 MICROgram(s) Inhaler 2 Puff(s) Inhalation two times a day  ceftolozane/tazobactam IVPB 1.5 Gram(s) IV Intermittent every 8 hours  clobetasol 0.05% Ointment 1 Application(s) Topical two times a day  clonazePAM Tablet 1.5 milliGRAM(s) Oral at bedtime  clopidogrel Tablet 75 milliGRAM(s) Oral daily  dextrose 5%. 1000 milliLiter(s) (50 mL/Hr) IV Continuous <Continuous>  dextrose 50% Injectable 12.5 Gram(s) IV Push once  dextrose 50% Injectable 25 Gram(s) IV Push once  dextrose 50% Injectable 25 Gram(s) IV Push once  fentaNYL   Patch  12 MICROgram(s)/Hr 1 Patch Transdermal every 72 hours  ferrous    sulfate Liquid 300 milliGRAM(s) Enteral Tube daily  furosemide    Tablet 40 milliGRAM(s) Oral daily  gabapentin   Solution 300 milliGRAM(s) Oral two times a day  insulin lispro (HumaLOG) corrective regimen sliding scale   SubCutaneous every 6 hours  lisinopril 5 milliGRAM(s) Oral daily  medical marijuana oil 1 milliLiter(s),medical marijuana oil 1 milliLiter(s) 1 milliLiter(s) SubLingual <User Schedule>  minocycline 100 milliGRAM(s) Oral two times a day  mirtazapine 7.5 milliGRAM(s) Oral at bedtime  pantoprazole   Suspension 40 milliGRAM(s) Oral before breakfast  predniSONE   Tablet 7.5 milliGRAM(s) Oral <User Schedule>  QUEtiapine 25 milliGRAM(s) Oral at bedtime  sertraline 50 milliGRAM(s) Oral daily  tiotropium 18 MICROgram(s) Capsule 1 Capsule(s) Inhalation daily  tiZANidine 4 milliGRAM(s) Oral <User Schedule>      MEDICATIONS  (PRN):  acetaminophen    Suspension .. 680 milliGRAM(s) Oral every 6 hours PRN Temp greater or equal to 38C (100.4F), Mild Pain (1 - 3)  dextrose 40% Gel 15 Gram(s) Oral once PRN Blood Glucose LESS THAN 70 milliGRAM(s)/deciliter  glucagon  Injectable 1 milliGRAM(s) IntraMuscular once PRN Glucose LESS THAN 70 milligrams/deciliter  nystatin Powder 1 Application(s) Topical two times a day PRN rash/itchiness       ---___---___---___---___---___---     <<<REVIEW OF SYSTEM: >>>    GEN: no fever, no chills, no pain  RESP: no SOB, no cough, no sputum  CVS: no chest pain, no palpitations, no edema  GI: no abdominal pain, no nausea, no vomiting, no constipation, no diarrhea  : no dysurea, no frequency  NEURO: no headache, no dizziness  PSYCH: no depression, not anxious  Derm : no itching, no rash     ---___---___---___---___---___---          <<<  VITAL SIGNS: >>>    T(F): 99.8 (12-12-18 @ 06:40), Max: 99.8 (12-12-18 @ 06:40)  HR: 88 (12-12-18 @ 04:03) (84 - 109)  BP: 111/60 (12-12-18 @ 04:03) (111/60 - 149/71)  RR: 18 (12-12-18 @ 04:03) (18 - 18)  SpO2: 95% (12-12-18 @ 04:03) (94% - 95%)  Wt(kg): --  CAPILLARY BLOOD GLUCOSE      POCT Blood Glucose.: 141 mg/dL (12 Dec 2018 05:56)    I&O's Summary    11 Dec 2018 07:01  -  12 Dec 2018 07:00  --------------------------------------------------------  IN: 690 mL / OUT: 2400 mL / NET: -1710 mL         ---___---___---___---___---___---                       PHYSICAL EXAM:    GEN: A&O X 3 , NAD , comfortable  HEENT: NCAT, PERRL, MMM, no scleral icterus, hearing intact  NECK: Supple, No JVD  CVS: S1S2 , regular , No M/R/G appreciated  PULM: CTA B/L,  no W/R/R appreciated  ABD.: soft. non tender, non distended,  bowel sounds present peg noted  Extrem: intact pulses , no edema noted  Derm: No rash or ecchymosis noted  PSYCH: normal mood, no depression, not anxious     ---___---___---___---___---___---     <<<  LAB AND IMAGING: >>>                          10.1   8.70  )-----------( 289      ( 12 Dec 2018 07:05 )             31.7               12-12    140  |  100  |  37<H>  ----------------------------<  214<H>  3.5   |  25  |  0.47<L>    Ca    8.9      12 Dec 2018 05:55                                 [All pertinent / recent available Imaging reports and other labs reviewed]     ---___---___---___---___---___---___ ---___---___---___---___---           <<<  A S S E S S M E N T   DEO N YE   P L A N :  >>>          -GI/DVT Prophylaxis.    --------------------------------------------     >>______________________<<      Deniz Bolden .         phone   [phone]

## 2018-12-12 NOTE — PROGRESS NOTE ADULT - ASSESSMENT
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

## 2018-12-12 NOTE — PROGRESS NOTE ADULT - ASSESSMENT
68 year-old woman with Alzheimer's dementia seen to have volume overload and biventricular congestive heart failure.  TTE on this admission shows reduced LVEF, elevated pulmonary pressures.    Continue Lasix 40 mg via PEG daily.  Keep O > I.  Keep K > 4.0 and Mag > 2.0.  Monitor BUN/Cr with daily labs until discharge.    Given severely reduced LVEF, plan to continue ACEi and uptitrate as blood pressure permits.  Will defer starting beta-blocker in this patient as she has baseline restrictive airway disease and is beta-agonist inhalers. Can reconsider low dose carvedilol as outpatient.    Patient with low grade fevers overnight two nights ago - follow-up ID recommendations for antibiotics (currently being treated for MDR pseudomonas).  Discharge planning per primary team.

## 2018-12-13 ENCOUNTER — TRANSCRIPTION ENCOUNTER (OUTPATIENT)
Age: 68
End: 2018-12-13

## 2018-12-13 LAB
ANION GAP SERPL CALC-SCNC: 18 MMOL/L — HIGH (ref 5–17)
BUN SERPL-MCNC: 53 MG/DL — HIGH (ref 7–23)
CALCIUM SERPL-MCNC: 9.2 MG/DL — SIGNIFICANT CHANGE UP (ref 8.4–10.5)
CHLORIDE SERPL-SCNC: 101 MMOL/L — SIGNIFICANT CHANGE UP (ref 96–108)
CO2 SERPL-SCNC: 25 MMOL/L — SIGNIFICANT CHANGE UP (ref 22–31)
CREAT SERPL-MCNC: 0.64 MG/DL — SIGNIFICANT CHANGE UP (ref 0.5–1.3)
GLUCOSE BLDC GLUCOMTR-MCNC: 128 MG/DL — HIGH (ref 70–99)
GLUCOSE BLDC GLUCOMTR-MCNC: 145 MG/DL — HIGH (ref 70–99)
GLUCOSE BLDC GLUCOMTR-MCNC: 172 MG/DL — HIGH (ref 70–99)
GLUCOSE SERPL-MCNC: 119 MG/DL — HIGH (ref 70–99)
HCT VFR BLD CALC: 35 % — SIGNIFICANT CHANGE UP (ref 34.5–45)
HGB BLD-MCNC: 11.1 G/DL — LOW (ref 11.5–15.5)
MAGNESIUM SERPL-MCNC: 1.8 MG/DL — SIGNIFICANT CHANGE UP (ref 1.6–2.6)
MCHC RBC-ENTMCNC: 30.2 PG — SIGNIFICANT CHANGE UP (ref 27–34)
MCHC RBC-ENTMCNC: 31.7 GM/DL — LOW (ref 32–36)
MCV RBC AUTO: 95.4 FL — SIGNIFICANT CHANGE UP (ref 80–100)
PLATELET # BLD AUTO: 307 K/UL — SIGNIFICANT CHANGE UP (ref 150–400)
POTASSIUM SERPL-MCNC: 3.9 MMOL/L — SIGNIFICANT CHANGE UP (ref 3.5–5.3)
POTASSIUM SERPL-SCNC: 3.9 MMOL/L — SIGNIFICANT CHANGE UP (ref 3.5–5.3)
RBC # BLD: 3.67 M/UL — LOW (ref 3.8–5.2)
RBC # FLD: 17.4 % — HIGH (ref 10.3–14.5)
SODIUM SERPL-SCNC: 144 MMOL/L — SIGNIFICANT CHANGE UP (ref 135–145)
WBC # BLD: 10.07 K/UL — SIGNIFICANT CHANGE UP (ref 3.8–10.5)
WBC # FLD AUTO: 10.07 K/UL — SIGNIFICANT CHANGE UP (ref 3.8–10.5)

## 2018-12-13 RX ADMIN — Medication 3 MILLILITER(S): at 06:13

## 2018-12-13 RX ADMIN — MINOCYCLINE HYDROCHLORIDE 100 MILLIGRAM(S): 45 TABLET, EXTENDED RELEASE ORAL at 06:13

## 2018-12-13 RX ADMIN — Medication 680 MILLIGRAM(S): at 02:20

## 2018-12-13 RX ADMIN — Medication 1 APPLICATION(S): at 22:30

## 2018-12-13 RX ADMIN — GABAPENTIN 300 MILLIGRAM(S): 400 CAPSULE ORAL at 17:46

## 2018-12-13 RX ADMIN — MIRTAZAPINE 7.5 MILLIGRAM(S): 45 TABLET, ORALLY DISINTEGRATING ORAL at 21:05

## 2018-12-13 RX ADMIN — Medication 1 APPLICATION(S): at 14:17

## 2018-12-13 RX ADMIN — Medication 680 MILLIGRAM(S): at 13:53

## 2018-12-13 RX ADMIN — Medication 3 MILLILITER(S): at 17:45

## 2018-12-13 RX ADMIN — Medication 680 MILLIGRAM(S): at 22:28

## 2018-12-13 RX ADMIN — Medication 7.5 MILLIGRAM(S): at 09:11

## 2018-12-13 RX ADMIN — Medication 680 MILLIGRAM(S): at 23:38

## 2018-12-13 RX ADMIN — TIZANIDINE 4 MILLIGRAM(S): 4 TABLET ORAL at 09:10

## 2018-12-13 RX ADMIN — CEFTOLOZANE AND TAZOBACTAM 100 GRAM(S): 1; .5 INJECTION, POWDER, LYOPHILIZED, FOR SOLUTION INTRAVENOUS at 06:14

## 2018-12-13 RX ADMIN — GABAPENTIN 300 MILLIGRAM(S): 400 CAPSULE ORAL at 06:14

## 2018-12-13 RX ADMIN — Medication 1 APPLICATION(S): at 06:13

## 2018-12-13 RX ADMIN — Medication 40 MILLIGRAM(S): at 06:12

## 2018-12-13 RX ADMIN — SERTRALINE 50 MILLIGRAM(S): 25 TABLET, FILM COATED ORAL at 11:34

## 2018-12-13 RX ADMIN — Medication 1 APPLICATION(S): at 06:12

## 2018-12-13 RX ADMIN — QUETIAPINE FUMARATE 25 MILLIGRAM(S): 200 TABLET, FILM COATED ORAL at 21:05

## 2018-12-13 RX ADMIN — Medication 1.5 MILLIGRAM(S): at 22:29

## 2018-12-13 RX ADMIN — PANTOPRAZOLE SODIUM 40 MILLIGRAM(S): 20 TABLET, DELAYED RELEASE ORAL at 06:12

## 2018-12-13 RX ADMIN — CLOPIDOGREL BISULFATE 75 MILLIGRAM(S): 75 TABLET, FILM COATED ORAL at 11:34

## 2018-12-13 RX ADMIN — BUDESONIDE AND FORMOTEROL FUMARATE DIHYDRATE 2 PUFF(S): 160; 4.5 AEROSOL RESPIRATORY (INHALATION) at 06:13

## 2018-12-13 RX ADMIN — LISINOPRIL 5 MILLIGRAM(S): 2.5 TABLET ORAL at 06:13

## 2018-12-13 RX ADMIN — Medication 1 APPLICATION(S): at 17:47

## 2018-12-13 RX ADMIN — Medication 3 MILLILITER(S): at 11:34

## 2018-12-13 RX ADMIN — FENTANYL CITRATE 1 PATCH: 50 INJECTION INTRAVENOUS at 07:31

## 2018-12-13 RX ADMIN — Medication 1: at 17:55

## 2018-12-13 RX ADMIN — Medication 3 MILLILITER(S): at 23:58

## 2018-12-13 RX ADMIN — CEFTOLOZANE AND TAZOBACTAM 100 GRAM(S): 1; .5 INJECTION, POWDER, LYOPHILIZED, FOR SOLUTION INTRAVENOUS at 22:28

## 2018-12-13 RX ADMIN — BUDESONIDE AND FORMOTEROL FUMARATE DIHYDRATE 2 PUFF(S): 160; 4.5 AEROSOL RESPIRATORY (INHALATION) at 17:46

## 2018-12-13 RX ADMIN — TIZANIDINE 4 MILLIGRAM(S): 4 TABLET ORAL at 21:04

## 2018-12-13 RX ADMIN — Medication 300 MILLIGRAM(S): at 11:34

## 2018-12-13 RX ADMIN — FENTANYL CITRATE 1 PATCH: 50 INJECTION INTRAVENOUS at 19:37

## 2018-12-13 RX ADMIN — MINOCYCLINE HYDROCHLORIDE 100 MILLIGRAM(S): 45 TABLET, EXTENDED RELEASE ORAL at 17:45

## 2018-12-13 RX ADMIN — TIOTROPIUM BROMIDE 1 CAPSULE(S): 18 CAPSULE ORAL; RESPIRATORY (INHALATION) at 11:38

## 2018-12-13 RX ADMIN — CEFTOLOZANE AND TAZOBACTAM 100 GRAM(S): 1; .5 INJECTION, POWDER, LYOPHILIZED, FOR SOLUTION INTRAVENOUS at 14:13

## 2018-12-13 NOTE — DISCHARGE NOTE ADULT - INSTRUCTIONS
bolus feeds of Vital 1.2 AF @ 200ml q 4hs (5 feeds per day from 8am-10pm).  Banatrol Plus 2 packets per day as needed in setting of loose/frequent stooling - Glucerna 1.2 240ml q4 hrs: 8am, 12pm, 4pm, 8pm (4 cans total). Regimen to provide 960ml total volume, 1152Kcal, 57.6Gm protein/day and 773ml free water (27 Kcal/Kg and 1.4Gm/Kg based on weight of 42Kg per day).   - Continue Iron once per day (provides 90 Kcal and 14 Gm amino acids per serving

## 2018-12-13 NOTE — CHART NOTE - NSCHARTNOTEFT_GEN_A_CORE
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    Vital Signs Last 24 Hrs  T(C): 38.1 (18 @ 21:16), Max: 38.1 (18 @ 21:16)  T(F): 100.6 (18 @ 21:16), Max: 100.6 (18 @ 21:16)  HR: 106 (18 @ 21:16) (89 - 106)  BP: 133/61 (18 @ 21:16) (103/63 - 133/61)  BP(mean): --  RR: 17 (18 @ 21:16) (17 - 18)  SpO2: 96% (18 @ 21:16) (94% - 96%)  Daily     Daily Weight in k.3 (13 Dec 2018 09:55)  I&O's Summary    12 Dec 2018 07:  -  13 Dec 2018 07:00  --------------------------------------------------------  IN: 1500 mL / OUT: 1625 mL / NET: -125 mL    13 Dec 2018 07:  -  13 Dec 2018 22:27  --------------------------------------------------------  IN: 600 mL / OUT: 700 mL / NET: -100 mL      CAPILLARY BLOOD GLUCOSE                          11.1   10.07 )-----------( 307      ( 13 Dec 2018 06:45 )             35.0     12-13    144  |  101  |  53<H>  ----------------------------<  119<H>  3.9   |  25  |  0.64    Ca    9.2      13 Dec 2018 05:55  Mg     1.8     12-          PHYSICAL EXAM:  GENERAL: NAD  CHEST/LUNG: Clear to auscultation bilaterally;   HEART: Regular rate and rhythm;   ABDOMEN: Soft, Nontender, Nondistended; Bowel sounds present  EXTREMITIES:  2+ Peripheral Pulses, No clubbing, cyanosis, or edema    Assessment/Plan: febrile likely 2/2 to MDR UTI   - blood culture x2/ua /ucx   - tylenol for fever/cold packs   - per ID nonspecific CXR, will hold off on imaging as confirmed MDR UTI as source of infection, if decompensates, persistent fever/cough will order CXR.   - zerbaxa IV per ID   - f/u ID   - will continue to monitor   - will endorse to AM team       Francis Dixon PA-C,   Department of Medicine

## 2018-12-13 NOTE — DISCHARGE NOTE ADULT - PATIENT PORTAL LINK FT
You can access the [facility] Patient Portal, offered by [facility], by registering with the following website: [email or web address]

## 2018-12-13 NOTE — DISCHARGE NOTE ADULT - CARE PLAN
Principal Discharge DX:	CHF exacerbation  Goal:	symptom management  Assessment and plan of treatment:	Weigh yourself daily.  If you gain 3lbs in 3 days, or 5lbs in a week call your Health Care Provider.  Do not eat or drink foods containing more than 2000mg of salt (sodium) in your diet every day.  Call your Health Care Provider if you have any swelling or increased swelling in your feet, ankles, and/or stomach.  Take all of your medication as directed.  If you become dizzy call your Health Care Provider.  Secondary Diagnosis:	Fever  Goal:	resolved  Assessment and plan of treatment:	Make appointments to follow up with your physicians  Secondary Diagnosis:	DVT (deep venous thrombosis)  Goal:	resolved  Assessment and plan of treatment:	Take your "blood thinners" as prescribed.  Walking is encouraged, increase activity as tolerated.  If you develop new leg pain, swelling, and/or redness contact your healthcare provider.  If you develop new chest pain with difficulty breathing, a rapid heart rate and/or a feeling of passing out call emergency medical services 911. Principal Discharge DX:	CHF exacerbation  Goal:	symptom management  Assessment and plan of treatment:	Weigh yourself daily.  If you gain 3lbs in 3 days, or 5lbs in a week call your Health Care Provider.  Do not eat or drink foods containing more than 2000mg of salt (sodium) in your diet every day.  Call your Health Care Provider if you have any swelling or increased swelling in your feet, ankles, and/or stomach.  Take all of your medication as directed.  If you become dizzy call your Health Care Provider.  Secondary Diagnosis:	Fever  Goal:	resolved  Assessment and plan of treatment:	Make appointments to follow up with your physicians  Secondary Diagnosis:	DVT (deep venous thrombosis)  Goal:	resolved  Assessment and plan of treatment:	Take your Eliquis as prescribed.  Walking is encouraged, increase activity as tolerated.  If you develop new leg pain, swelling, and/or redness contact your healthcare provider.  If you develop new chest pain with difficulty breathing, a rapid heart rate and/or a feeling of passing out call emergency medical services 911.

## 2018-12-13 NOTE — DISCHARGE NOTE ADULT - CASE MANAGER'S NAME
Denisse Scherer RN--[phone] Treasure Gandhi RN MPHA   Deisy Kingston, MSN, RN Case Manager- [phone]

## 2018-12-13 NOTE — PROGRESS NOTE ADULT - SUBJECTIVE AND OBJECTIVE BOX
---___---___---___---___---___---___ ---___---___---___---___---___---___---___---___---___---                    <<<  M E D I C A L   A T T E N D I N G    F O L L O W    U P   N O T E  >>>    - Patient seen and examined by me approximately thirty minutes ago.  - In summary, patient is a 68y year old Female who origianlly presented with elevated bnp  - Patient today overall doing ok, comfortable, eating OK.     ---___---___---___---___---___---      <<<  MEDICATIONS:  >>>    MEDICATIONS  (STANDING):  ALBUTerol/ipratropium for Nebulization 3 milliLiter(s) Nebulizer every 6 hours  AQUAPHOR (petrolatum Ointment) 1 Application(s) Topical three times a day  buDESOnide 160 MICROgram(s)/formoterol 4.5 MICROgram(s) Inhaler 2 Puff(s) Inhalation two times a day  ceftolozane/tazobactam IVPB 1.5 Gram(s) IV Intermittent every 8 hours  clobetasol 0.05% Ointment 1 Application(s) Topical two times a day  clonazePAM Tablet 1.5 milliGRAM(s) Oral at bedtime  clopidogrel Tablet 75 milliGRAM(s) Oral daily  dextrose 5%. 1000 milliLiter(s) (50 mL/Hr) IV Continuous <Continuous>  dextrose 50% Injectable 12.5 Gram(s) IV Push once  dextrose 50% Injectable 25 Gram(s) IV Push once  dextrose 50% Injectable 25 Gram(s) IV Push once  fentaNYL   Patch  12 MICROgram(s)/Hr 1 Patch Transdermal every 72 hours  ferrous    sulfate Liquid 300 milliGRAM(s) Enteral Tube daily  furosemide    Tablet 40 milliGRAM(s) Oral daily  gabapentin   Solution 300 milliGRAM(s) Oral two times a day  insulin lispro (HumaLOG) corrective regimen sliding scale   SubCutaneous every 6 hours  lisinopril 5 milliGRAM(s) Oral daily  medical marijuana oil 1 milliLiter(s),medical marijuana oil 1 milliLiter(s) 1 milliLiter(s) SubLingual <User Schedule>  minocycline 100 milliGRAM(s) Oral two times a day  mirtazapine 7.5 milliGRAM(s) Oral at bedtime  pantoprazole   Suspension 40 milliGRAM(s) Oral before breakfast  predniSONE   Tablet 7.5 milliGRAM(s) Oral <User Schedule>  QUEtiapine 25 milliGRAM(s) Oral at bedtime  sertraline 50 milliGRAM(s) Oral daily  tiotropium 18 MICROgram(s) Capsule 1 Capsule(s) Inhalation daily  tiZANidine 4 milliGRAM(s) Oral <User Schedule>      MEDICATIONS  (PRN):  acetaminophen    Suspension .. 680 milliGRAM(s) Oral every 6 hours PRN Temp greater or equal to 38C (100.4F), Mild Pain (1 - 3)  dextrose 40% Gel 15 Gram(s) Oral once PRN Blood Glucose LESS THAN 70 milliGRAM(s)/deciliter  glucagon  Injectable 1 milliGRAM(s) IntraMuscular once PRN Glucose LESS THAN 70 milligrams/deciliter  nystatin Powder 1 Application(s) Topical two times a day PRN rash/itchiness       ---___---___---___---___---___---     <<<REVIEW OF SYSTEM: >>>    GEN: no fever, no chills, no pain  RESP: no SOB, no cough, no sputum  CVS: no chest pain, no palpitations, no edema  GI: no abdominal pain, no nausea, no vomiting, no constipation, no diarrhea  : no dysurea, no frequency  NEURO: no headache, no dizziness  PSYCH: no depression, not anxious  Derm : no itching, no rash     ---___---___---___---___---___---          <<<  VITAL SIGNS: >>>    T(F): 97.7 (12-13-18 @ 11:24), Max: 98.5 (12-12-18 @ 23:57)  HR: 93 (12-13-18 @ 11:24) (89 - 106)  BP: 114/66 (12-13-18 @ 11:24) (103/63 - 133/69)  RR: 17 (12-13-18 @ 11:24) (17 - 18)  SpO2: 95% (12-13-18 @ 11:24) (94% - 97%)  Wt(kg): --  CAPILLARY BLOOD GLUCOSE      POCT Blood Glucose.: 172 mg/dL (13 Dec 2018 17:43)    I&O's Summary    12 Dec 2018 07:01  -  13 Dec 2018 07:00  --------------------------------------------------------  IN: 1500 mL / OUT: 1625 mL / NET: -125 mL    13 Dec 2018 07:01  -  13 Dec 2018 19:46  --------------------------------------------------------  IN: 600 mL / OUT: 700 mL / NET: -100 mL         ---___---___---___---___---___---                       PHYSICAL EXAM:    GEN: A&O X 3 , NAD , comfortable  HEENT: NCAT, PERRL, MMM, no scleral icterus, hearing intact  NECK: Supple, No JVD  CVS: S1S2 , regular , No M/R/G appreciated  PULM: CTA B/L,  no W/R/R appreciated  ABD.: soft. non tender, non distended,  bowel sounds presented  peg noted   Extrem: intact pulses , no edema noted  Derm: No rash or ecchymosis noted  PSYCH: normal mood, no depression, not anxious     ---___---___---___---___---___---     <<<  LAB AND IMAGING: >>>                          11.1   10.07 )-----------( 307      ( 13 Dec 2018 06:45 )             35.0               12-13    144  |  101  |  53<H>  ----------------------------<  119<H>  3.9   |  25  |  0.64    Ca    9.2      13 Dec 2018 05:55  Mg     1.8     12-13                                 [All pertinent / recent available Imaging reports and other labs reviewed]     ---___---___---___---___---___---___ ---___---___---___---___---           <<<  A S S E S S M E N T   A N D   P L A N :  >>>          -GI/DVT Prophylaxis.    --------------------------------------------  Case discussed with patient   Education given on     >>______________________<<      Deniz Bolden .         phone   [phone]

## 2018-12-13 NOTE — DISCHARGE NOTE ADULT - MEDICATION SUMMARY - MEDICATIONS TO STOP TAKING
I will STOP taking the medications listed below when I get home from the hospital:    triamterene 100 mg oral capsule  -- 1 cap(s) by gastrostomy tube every other day   -- Avoid prolonged or excessive exposure to direct and/or artificial sunlight while taking this medication.  It is very important that you take or use this exactly as directed.  Do not skip doses or discontinue unless directed by your doctor.  May cause drowsiness or dizziness.    fentaNYL 12 mcg/hr transdermal film, extended release  -- 1 patch by transdermal patch every 72 hours MDD:1 patch every 3 days    minocycline 100 mg oral tablet  -- 1 tab(s) by gastrostomy tube every 12 hours    Klor-Con 10 mEq oral tablet, extended release  -- 1 tab(s) by gastrostomy tube every other day    freetext medication  - controlled substance  -- 1 milliliter(s) under tongue 3 times a day -up with your primary care physician for script

## 2018-12-13 NOTE — PROGRESS NOTE ADULT - SUBJECTIVE AND OBJECTIVE BOX
CC: f/u for pseudomonas uti    Patient reports nothing not speaking    REVIEW OF SYSTEMS:  All other review of systems cannot get (Comprehensive ROS)    Antimicrobials Day #  :  ceftolozane/tazobactam IVPB 1.5 Gram(s) IV Intermittent every 8 hours  minocycline 100 milliGRAM(s) Oral two times a day    Other Medications Reviewed    vss per flow reviewed  PHYSICAL EXAM:  General: awake, contracted neck, no acute distress  Eyes:  anicteric, no conjunctival injection, no discharge  Oropharynx: no lesions or injection 	  Neck: supple, without adenopathy  Lungs: clear to auscultation  Heart: regular rate and rhythm; no murmur, rubs or gallops  Abdomen: soft, nondistended, nontender, without mass or organomegaly  Skin: no lesions  Extremities: no clubbing, cyanosis,. right leg some edema  Neurologic: awake, contracted not speaking    LAB RESULTS:              Complete Blood Count in AM (12.13.18 @ 06:45)    WBC Count: 10.07 K/uL    RBC Count: 3.67 M/uL    Hemoglobin: 11.1 g/dL    Hematocrit: 35.0 %    Mean Cell Volume: 95.4 fl    Mean Cell Hemoglobin: 30.2 pg    Mean Cell Hemoglobin Conc: 31.7 gm/dL    Red Cell Distrib Width: 17.4 %    Platelet Count - Automated: 307 K/uL      MICROBIOLOGY:  RECENT CULTURES:  12-11 @ 09:37 .Blood Blood     No growth to date.          RADIOLOGY REVIEWED:    < from: Xray Chest 1 View- PORTABLE-Urgent (12.08.18 @ 23:46) >  IMPRESSION:   Chronic fibrotic lung changes versus mild interstitial edema.  No right lung focal infiltrates.  Interval increase in left pleural effusion (c/w 12/2/18 plain chest);   left basal atelectasis and/or pneumonia as above.    < end of copied text >    Assessment:  Very demented female, recent prolonged stay for mrsa bacteremia and infection of right thr, peg, gavin, multiple aspiration events now on tx for cre pseudomonas cauti  Plan:  2 more days of zerbaxa

## 2018-12-13 NOTE — DISCHARGE NOTE ADULT - CARE PROVIDER_API CALL
Carlito Francis (), Internal Medicine; Pulmonary Disease  [address] [facility]  [address]  [place] [address]  Phone: [phone]  Fax: [phone]    Deniz Bolden (), Family Medicine  [address] [place] [address]  Phone: [phone]  Fax: [phone]

## 2018-12-13 NOTE — DISCHARGE NOTE ADULT - ADDITIONAL INSTRUCTIONS
Make appointments to follow up with your physicians  Bring all discharge paperwork including discharge medication list to follow up appointments Your urinary catheter was changed on 12/19/18  Make appointments to follow up with your physicians  Bring all discharge paperwork including discharge medication list to follow up appointments Your urinary catheter was changed on 1/16/19  Make appointments to follow up with your physicians  Bring all discharge paperwork including discharge medication list to follow up appointments

## 2018-12-13 NOTE — DISCHARGE NOTE ADULT - MEDICATION SUMMARY - MEDICATIONS TO TAKE
I will START or STAY ON the medications listed below when I get home from the hospital:    Glucerna Bolus feeds 4 x day via PEG  -- give bolus feeds four times a day at 0800,1200,1600,2000  -- Indication: For Dysphagia, unspecified type    - Glucerna 1.2 240ml q4 hrs: 8am, 12pm, 4pm, 8pm (4 cans total). Regimen to provide 960ml total volume, 1152Kcal, 57.6Gm protein/day and 773ml free water (27 Kcal/Kg and 1.4Gm/Kg based on weight of 42Kg per day).   -- Indication: For Dysphagia, unspecified type    predniSONE 2.5 mg oral tablet  -- 3 tab(s) by gastrostomy tube once a day  at 10 am   -- Indication: For Ulcerative colitis    acetaminophen 160 mg/5 mL oral suspension  -- milliliter(s) by gastrostomy tube , As Needed - 3)   -- Indication: For Discomfort    lisinopril 5 mg oral tablet  -- 1 tab(s) by mouth once a day  -- Indication: For Congestive heart failure with left ventricular systolic dysfunction    apixaban 5 mg oral tablet  -- 1 tab(s) by mouth every 12 hours  -- Indication: For DVT (deep venous thrombosis)    gabapentin 250 mg/5 mL oral solution  -- 6 milliliter(s) by mouth 2 times a day  -- Indication: For Prophylaxis    clonazePAM 0.5 mg oral tablet  -- 1 tab(s) by mouth once a day (at bedtime)  -- Indication: For Prophylaxis    mirtazapine 7.5 mg oral tablet  -- 1 tab(s) by mouth once a day (at bedtime)  -- Indication: For Depression    metFORMIN 500 mg oral tablet  -- 1 tab(s) by gastrostomy tube once a day (at bedtime)  -- Check with your doctor before becoming pregnant.  Do not drink alcoholic beverages when taking this medication.  It is very important that you take or use this exactly as directed.  Do not skip doses or discontinue unless directed by your doctor.  Obtain medical advice before taking any non-prescription drugs as some may affect the action of this medication.  Take with food or milk.    -- Indication: For IGT (impaired glucose tolerance)    QUEtiapine 25 mg oral tablet  -- 1 tab(s) by mouth once a day (at bedtime)  -- Indication: For Prophylactic measure    ipratropium-albuterol 0.5 mg-2.5 mg/3 mLinhalation solution  -- 3 milliliter(s) inhaled every 6 hours  -- Indication: For Shortness of breath    budesonide-formoterol 160 mcg-4.5 mcg/inh inhalation aerosol  -- 2 puff(s) inhaled 2 times a day  -- Indication: For Shortness of breath    ipratropium 500 mcg/2.5 mL inhalation solution  -- 2.5 milliliter(s) inhaled every 6 hours, As needed, Respiratory Distress  -- Indication: For Shortness of breath    petrolatum topical ointment  -- 1 application on skin 3 times a day  -- Indication: For Dry skin    clobetasol 0.05% topical ointment  -- 1 application on skin 2 times a day  -- Indication: For Prophylaxis    nystatin 100,000 units/g topical powder  -- 1 application on skin 2 times a day, As needed, rash/itchiness  -- Indication: For rash    ferrous sulfate 300 mg/5 mL (60 mg elemental iron) oral liquid  -- 5 milliliter(s) by gastrostomy tube once a day   -- Indication: For Suppliment    tiZANidine 4 mg oral tablet  -- 1 tab(s) by gastrostomy tube 2 times a day   -- Indication: For Muscle relaxant    PriLOSEC OTC 20 mg oral delayed release tablet  -- 2 tab(s) by gastrostomy tube once a day  -- Indication: For GI prophylaxis    ergocalciferol  -- 1 milliliter(s) by gastrostomy tube once a day  -- Indication: For Suppliment

## 2018-12-13 NOTE — DISCHARGE NOTE ADULT - HOME CARE AGENCY
[facility] for  wound care  [phone] [facility] for  RN assessment, wound care  [phone] soc one day after discharge. [facility] for enteral feed supplies  Option 2 to place order [facility] for  RN assessment, wound care  [phone]. Nursing services to begin day after discharge.

## 2018-12-14 LAB
ANION GAP SERPL CALC-SCNC: 19 MMOL/L — HIGH (ref 5–17)
APPEARANCE UR: CLEAR — SIGNIFICANT CHANGE UP
BILIRUB UR-MCNC: NEGATIVE — SIGNIFICANT CHANGE UP
BUN SERPL-MCNC: 58 MG/DL — HIGH (ref 7–23)
CALCIUM SERPL-MCNC: 9.3 MG/DL — SIGNIFICANT CHANGE UP (ref 8.4–10.5)
CHLORIDE SERPL-SCNC: 101 MMOL/L — SIGNIFICANT CHANGE UP (ref 96–108)
CO2 SERPL-SCNC: 24 MMOL/L — SIGNIFICANT CHANGE UP (ref 22–31)
COLOR SPEC: YELLOW — SIGNIFICANT CHANGE UP
CREAT SERPL-MCNC: 0.63 MG/DL — SIGNIFICANT CHANGE UP (ref 0.5–1.3)
CULTURE RESULTS: SIGNIFICANT CHANGE UP
CULTURE RESULTS: SIGNIFICANT CHANGE UP
DIFF PNL FLD: NEGATIVE — SIGNIFICANT CHANGE UP
GLUCOSE BLDC GLUCOMTR-MCNC: 141 MG/DL — HIGH (ref 70–99)
GLUCOSE BLDC GLUCOMTR-MCNC: 142 MG/DL — HIGH (ref 70–99)
GLUCOSE BLDC GLUCOMTR-MCNC: 150 MG/DL — HIGH (ref 70–99)
GLUCOSE BLDC GLUCOMTR-MCNC: 172 MG/DL — HIGH (ref 70–99)
GLUCOSE SERPL-MCNC: 143 MG/DL — HIGH (ref 70–99)
GLUCOSE UR QL: NEGATIVE — SIGNIFICANT CHANGE UP
HCT VFR BLD CALC: 37 % — SIGNIFICANT CHANGE UP (ref 34.5–45)
HGB BLD-MCNC: 11.7 G/DL — SIGNIFICANT CHANGE UP (ref 11.5–15.5)
KETONES UR-MCNC: NEGATIVE — SIGNIFICANT CHANGE UP
LEUKOCYTE ESTERASE UR-ACNC: ABNORMAL
MAGNESIUM SERPL-MCNC: 2 MG/DL — SIGNIFICANT CHANGE UP (ref 1.6–2.6)
MCHC RBC-ENTMCNC: 31 PG — SIGNIFICANT CHANGE UP (ref 27–34)
MCHC RBC-ENTMCNC: 31.6 GM/DL — LOW (ref 32–36)
MCV RBC AUTO: 97.9 FL — SIGNIFICANT CHANGE UP (ref 80–100)
NITRITE UR-MCNC: NEGATIVE — SIGNIFICANT CHANGE UP
PH UR: 6 — SIGNIFICANT CHANGE UP (ref 5–8)
PLATELET # BLD AUTO: 328 K/UL — SIGNIFICANT CHANGE UP (ref 150–400)
POTASSIUM SERPL-MCNC: 3.6 MMOL/L — SIGNIFICANT CHANGE UP (ref 3.5–5.3)
POTASSIUM SERPL-SCNC: 3.6 MMOL/L — SIGNIFICANT CHANGE UP (ref 3.5–5.3)
PROT UR-MCNC: ABNORMAL
RBC # BLD: 3.78 M/UL — LOW (ref 3.8–5.2)
RBC # FLD: 17.2 % — HIGH (ref 10.3–14.5)
SODIUM SERPL-SCNC: 144 MMOL/L — SIGNIFICANT CHANGE UP (ref 135–145)
SP GR SPEC: 1.02 — SIGNIFICANT CHANGE UP (ref 1.01–1.02)
SPECIMEN SOURCE: SIGNIFICANT CHANGE UP
SPECIMEN SOURCE: SIGNIFICANT CHANGE UP
UROBILINOGEN FLD QL: NEGATIVE — SIGNIFICANT CHANGE UP
WBC # BLD: 11.82 K/UL — HIGH (ref 3.8–10.5)
WBC # FLD AUTO: 11.82 K/UL — HIGH (ref 3.8–10.5)

## 2018-12-14 PROCEDURE — 71045 X-RAY EXAM CHEST 1 VIEW: CPT | Mod: 26

## 2018-12-14 PROCEDURE — 99232 SBSQ HOSP IP/OBS MODERATE 35: CPT

## 2018-12-14 RX ORDER — HEPARIN SODIUM 5000 [USP'U]/ML
5000 INJECTION INTRAVENOUS; SUBCUTANEOUS EVERY 12 HOURS
Qty: 0 | Refills: 0 | Status: DISCONTINUED | OUTPATIENT
Start: 2018-12-14 | End: 2018-12-15

## 2018-12-14 RX ORDER — ACETAMINOPHEN 500 MG
650 TABLET ORAL ONCE
Qty: 0 | Refills: 0 | Status: COMPLETED | OUTPATIENT
Start: 2018-12-14 | End: 2018-12-14

## 2018-12-14 RX ADMIN — Medication 300 MILLIGRAM(S): at 12:06

## 2018-12-14 RX ADMIN — Medication 1 APPLICATION(S): at 18:18

## 2018-12-14 RX ADMIN — MIRTAZAPINE 7.5 MILLIGRAM(S): 45 TABLET, ORALLY DISINTEGRATING ORAL at 22:44

## 2018-12-14 RX ADMIN — Medication 680 MILLIGRAM(S): at 15:00

## 2018-12-14 RX ADMIN — Medication 1 APPLICATION(S): at 22:43

## 2018-12-14 RX ADMIN — Medication 680 MILLIGRAM(S): at 14:30

## 2018-12-14 RX ADMIN — CEFTOLOZANE AND TAZOBACTAM 100 GRAM(S): 1; .5 INJECTION, POWDER, LYOPHILIZED, FOR SOLUTION INTRAVENOUS at 14:31

## 2018-12-14 RX ADMIN — Medication 1.5 MILLIGRAM(S): at 22:44

## 2018-12-14 RX ADMIN — SERTRALINE 50 MILLIGRAM(S): 25 TABLET, FILM COATED ORAL at 12:06

## 2018-12-14 RX ADMIN — CEFTOLOZANE AND TAZOBACTAM 100 GRAM(S): 1; .5 INJECTION, POWDER, LYOPHILIZED, FOR SOLUTION INTRAVENOUS at 22:43

## 2018-12-14 RX ADMIN — TIZANIDINE 4 MILLIGRAM(S): 4 TABLET ORAL at 09:27

## 2018-12-14 RX ADMIN — Medication 7.5 MILLIGRAM(S): at 09:27

## 2018-12-14 RX ADMIN — Medication 3 MILLILITER(S): at 22:44

## 2018-12-14 RX ADMIN — QUETIAPINE FUMARATE 25 MILLIGRAM(S): 200 TABLET, FILM COATED ORAL at 22:44

## 2018-12-14 RX ADMIN — Medication 1 APPLICATION(S): at 14:35

## 2018-12-14 RX ADMIN — BUDESONIDE AND FORMOTEROL FUMARATE DIHYDRATE 2 PUFF(S): 160; 4.5 AEROSOL RESPIRATORY (INHALATION) at 18:17

## 2018-12-14 RX ADMIN — MINOCYCLINE HYDROCHLORIDE 100 MILLIGRAM(S): 45 TABLET, EXTENDED RELEASE ORAL at 07:04

## 2018-12-14 RX ADMIN — FENTANYL CITRATE 1 PATCH: 50 INJECTION INTRAVENOUS at 19:24

## 2018-12-14 RX ADMIN — Medication 650 MILLIGRAM(S): at 18:52

## 2018-12-14 RX ADMIN — LISINOPRIL 5 MILLIGRAM(S): 2.5 TABLET ORAL at 07:04

## 2018-12-14 RX ADMIN — Medication 1: at 12:45

## 2018-12-14 RX ADMIN — FENTANYL CITRATE 1 PATCH: 50 INJECTION INTRAVENOUS at 07:45

## 2018-12-14 RX ADMIN — Medication 40 MILLIGRAM(S): at 07:04

## 2018-12-14 RX ADMIN — PANTOPRAZOLE SODIUM 40 MILLIGRAM(S): 20 TABLET, DELAYED RELEASE ORAL at 07:04

## 2018-12-14 RX ADMIN — CEFTOLOZANE AND TAZOBACTAM 100 GRAM(S): 1; .5 INJECTION, POWDER, LYOPHILIZED, FOR SOLUTION INTRAVENOUS at 07:05

## 2018-12-14 RX ADMIN — GABAPENTIN 300 MILLIGRAM(S): 400 CAPSULE ORAL at 07:05

## 2018-12-14 RX ADMIN — CLOPIDOGREL BISULFATE 75 MILLIGRAM(S): 75 TABLET, FILM COATED ORAL at 12:06

## 2018-12-14 RX ADMIN — MINOCYCLINE HYDROCHLORIDE 100 MILLIGRAM(S): 45 TABLET, EXTENDED RELEASE ORAL at 18:16

## 2018-12-14 RX ADMIN — TIZANIDINE 4 MILLIGRAM(S): 4 TABLET ORAL at 20:24

## 2018-12-14 RX ADMIN — Medication 3 MILLILITER(S): at 07:04

## 2018-12-14 RX ADMIN — Medication 3 MILLILITER(S): at 18:15

## 2018-12-14 RX ADMIN — Medication 650 MILLIGRAM(S): at 20:06

## 2018-12-14 RX ADMIN — BUDESONIDE AND FORMOTEROL FUMARATE DIHYDRATE 2 PUFF(S): 160; 4.5 AEROSOL RESPIRATORY (INHALATION) at 07:04

## 2018-12-14 RX ADMIN — Medication 3 MILLILITER(S): at 12:06

## 2018-12-14 RX ADMIN — GABAPENTIN 300 MILLIGRAM(S): 400 CAPSULE ORAL at 18:17

## 2018-12-14 RX ADMIN — HEPARIN SODIUM 5000 UNIT(S): 5000 INJECTION INTRAVENOUS; SUBCUTANEOUS at 18:52

## 2018-12-14 NOTE — PROGRESS NOTE ADULT - SUBJECTIVE AND OBJECTIVE BOX
HPI:  Febrile overnight.    Review Of Systems:   Unable to obtain meaningful review of systems given baseline dementia - patient seen resting comfortably         Medications:  acetaminophen    Suspension .. 680 milliGRAM(s) Oral every 6 hours PRN  ALBUTerol/ipratropium for Nebulization 3 milliLiter(s) Nebulizer every 6 hours  AQUAPHOR (petrolatum Ointment) 1 Application(s) Topical three times a day  buDESOnide 160 MICROgram(s)/formoterol 4.5 MICROgram(s) Inhaler 2 Puff(s) Inhalation two times a day  ceftolozane/tazobactam IVPB 1.5 Gram(s) IV Intermittent every 8 hours  clobetasol 0.05% Ointment 1 Application(s) Topical two times a day  clonazePAM Tablet 1.5 milliGRAM(s) Oral at bedtime  clopidogrel Tablet 75 milliGRAM(s) Oral daily  dextrose 40% Gel 15 Gram(s) Oral once PRN  dextrose 5%. 1000 milliLiter(s) IV Continuous <Continuous>  dextrose 50% Injectable 12.5 Gram(s) IV Push once  dextrose 50% Injectable 25 Gram(s) IV Push once  dextrose 50% Injectable 25 Gram(s) IV Push once  fentaNYL   Patch  12 MICROgram(s)/Hr 1 Patch Transdermal every 72 hours  ferrous    sulfate Liquid 300 milliGRAM(s) Enteral Tube daily  furosemide    Tablet 40 milliGRAM(s) Oral daily  gabapentin   Solution 300 milliGRAM(s) Oral two times a day  glucagon  Injectable 1 milliGRAM(s) IntraMuscular once PRN  heparin  Injectable 5000 Unit(s) SubCutaneous every 12 hours  insulin lispro (HumaLOG) corrective regimen sliding scale   SubCutaneous every 6 hours  lisinopril 5 milliGRAM(s) Oral daily  medical marijuana oil 1 milliLiter(s),medical marijuana oil 1 milliLiter(s) 1 milliLiter(s) SubLingual <User Schedule>  minocycline 100 milliGRAM(s) Oral two times a day  mirtazapine 7.5 milliGRAM(s) Oral at bedtime  nystatin Powder 1 Application(s) Topical two times a day PRN  pantoprazole   Suspension 40 milliGRAM(s) Oral before breakfast  predniSONE   Tablet 7.5 milliGRAM(s) Oral <User Schedule>  QUEtiapine 25 milliGRAM(s) Oral at bedtime  sertraline 50 milliGRAM(s) Oral daily  tiotropium 18 MICROgram(s) Capsule 1 Capsule(s) Inhalation daily  tiZANidine 4 milliGRAM(s) Oral <User Schedule>    PAST MEDICAL & SURGICAL HISTORY:  CVA (cerebral vascular accident)  Fatty pancreas  PNA (pneumonia)  Pulmonary HTN  IGT (impaired glucose tolerance)  Ulcerative colitis  Acid reflux  Anxiety  Depression  Mouth sores  HLD (hyperlipidemia)  Asthma  Humeral head fracture  H/O: hysterectomy  H/O cataract extraction, left  History of knee replacement    Vitals:  T(C): 38.5 (12-14-18 @ 18:04), Max: 38.5 (12-14-18 @ 18:04)  HR: 90 (12-14-18 @ 12:13) (90 - 106)  BP: 121/68 (12-14-18 @ 12:13) (121/68 - 133/61)  BP(mean): --  RR: 18 (12-14-18 @ 12:13) (17 - 18)  SpO2: 94% (12-14-18 @ 12:13) (94% - 96%)  Wt(kg): --  Daily     Daily   I&O's Summary    13 Dec 2018 07:01  -  14 Dec 2018 07:00  --------------------------------------------------------  IN: 600 mL / OUT: 1250 mL / NET: -650 mL    14 Dec 2018 07:01  -  14 Dec 2018 20:00  --------------------------------------------------------  IN: 600 mL / OUT: 400 mL / NET: 200 mL        Physical Exam:  Appearance: appears older than stated age; bedbound, demented; no acute distress  Eyes: PERRL, EOMI, pink conjunctiva  HENT: Normal oral mucosa  Cardiovascular: RRR, S1, S2; no LE edema bilaterally; normal JVP  Respiratory: poor inspiratory effort  Gastrointestinal: soft, non-tender, non-distended with normal bowel sounds; +PEG  Musculoskeletal: Bedbound, contracted  Neurologic: cranial nerves grossly intact  Lymphatic: No lymphadenopathy  Psychiatry: awake and alert  Skin: No rashes, ecchymoses, or cyanosis                          11.7   11.82 )-----------( 328      ( 14 Dec 2018 07:27 )             37.0     12-14    144  |  101  |  58<H>  ----------------------------<  143<H>  3.6   |  24  |  0.63    Ca    9.3      14 Dec 2018 06:51  Mg     2.0     12-14              Echo: < from: Transthoracic Echocardiogram (12.03.18 @ 11:23) >  ------------------------------------------------------------------------  Conclusions:  1. Mitral annular calcification, otherwise normal mitral  valve. Moderate-severe mitral regurgitation (vena contracta  0.8 cm)  2. Calcified trileafletaortic valve with normal opening.  Peak transaortic valve gradient equals 3 mm Hg, mean  transaortic valve gradient equals 1 mm Hg, aortic valve  velocity time integral equals 13 cm. Moderate aortic  regurgitation  3. Moderately dilated left atrium.LA volume index = 45  cc/m2.  4. Severe global left ventricular systolic dysfunction.  5. Increased E/e'  is consistent with elevated left  ventricular filling pressure.  6. Moderate right atrial enlargement. Inferior vena cava is  dilated (>=2.5cm) with normal respiratory variability  consistent with right atrial pressure 16-20mm Hg.  7. Right ventricular enlargement with decreased right  ventricular systolic function.  8. Normal tricuspid valve. Severe tricuspid regurgitation.  9. Estimated pulmonary artery systolic pressure equals 82  mm Hg, assuming right atrial pressure equals 8 mm Hg,  consistent with severe pulmonary pressures.  10. Color Doppler demonstrates evidence of left to right  shunt  ------------------------------------------------------------------------  Confirmed on  12/3/2018 - 16:37:07 by Margaret Khan M.D.  ------------------------------------------------------------------------    < end of copied text >    Imaging: < from: Xray Chest 1 View- PORTABLE-Urgent (12.02.18 @ 17:19) >  IMPRESSION:   Limited study due to patient positioning.  Trace right pleural effusion and likely left pleural effusion. Question   mild pulmonary edema.    HUAN JACOBS M.D., RADIOLOGY RESIDENT  This document has been electronically signed.  TONYA AARON M.D., ATTENDING RADIOLOGIST  This document has been electronically signed. Dec  2 2018  9:17PM      < end of copied text >    Interpretation of Telemetry: sinus rhythm

## 2018-12-14 NOTE — PROGRESS NOTE ADULT - ASSESSMENT
68 year-old woman with Alzheimer's dementia seen to have volume overload and biventricular congestive heart failure.  TTE on this admission shows reduced LVEF, elevated pulmonary pressures.    Continue Lasix 40 mg via PEG daily.  Keep O > I.  Keep K > 4.0 and Mag > 2.0.  Monitor BUN/Cr with daily labs until discharge.    Given severely reduced LVEF, plan to continue ACEi and uptitrate as blood pressure permits.  Will defer starting beta-blocker in this patient as she has baseline restrictive airway disease and is beta-agonist inhalers. Can reconsider low dose carvedilol as outpatient.    Patient with recurrent fever overnight - follow-up ID recommendations for antibiotics (currently being treated for MDR pseudomonas).  Discharge planning per primary team.    For any active cardiac issues this weekend, I will be covered by Costa Stearns MD.

## 2018-12-14 NOTE — PROGRESS NOTE ADULT - SUBJECTIVE AND OBJECTIVE BOX
---___---___---___---___---___---___ ---___---___---___---___---___---___---___---___---___---                    <<<  M E D I C A L   A T T E N D I N G    F O L L O W    U P   N O T E  >>>    - Patient seen and examined by me approximately thirty minutes ago.  - In summary, patient is a 68y year old Female who origianlly presented with elevated bnp now resolved but getting intermittent fevers on abx   - Patient today overall doing ok, comfortable, eating OK.     ---___---___---___---___---___---      <<<  MEDICATIONS:  >>>    MEDICATIONS  (STANDING):  ALBUTerol/ipratropium for Nebulization 3 milliLiter(s) Nebulizer every 6 hours  AQUAPHOR (petrolatum Ointment) 1 Application(s) Topical three times a day  buDESOnide 160 MICROgram(s)/formoterol 4.5 MICROgram(s) Inhaler 2 Puff(s) Inhalation two times a day  ceftolozane/tazobactam IVPB 1.5 Gram(s) IV Intermittent every 8 hours  clobetasol 0.05% Ointment 1 Application(s) Topical two times a day  clonazePAM Tablet 1.5 milliGRAM(s) Oral at bedtime  clopidogrel Tablet 75 milliGRAM(s) Oral daily  dextrose 5%. 1000 milliLiter(s) (50 mL/Hr) IV Continuous <Continuous>  dextrose 50% Injectable 12.5 Gram(s) IV Push once  dextrose 50% Injectable 25 Gram(s) IV Push once  dextrose 50% Injectable 25 Gram(s) IV Push once  fentaNYL   Patch  12 MICROgram(s)/Hr 1 Patch Transdermal every 72 hours  ferrous    sulfate Liquid 300 milliGRAM(s) Enteral Tube daily  furosemide    Tablet 40 milliGRAM(s) Oral daily  gabapentin   Solution 300 milliGRAM(s) Oral two times a day  insulin lispro (HumaLOG) corrective regimen sliding scale   SubCutaneous every 6 hours  lisinopril 5 milliGRAM(s) Oral daily  medical marijuana oil 1 milliLiter(s),medical marijuana oil 1 milliLiter(s) 1 milliLiter(s) SubLingual <User Schedule>  minocycline 100 milliGRAM(s) Oral two times a day  mirtazapine 7.5 milliGRAM(s) Oral at bedtime  pantoprazole   Suspension 40 milliGRAM(s) Oral before breakfast  predniSONE   Tablet 7.5 milliGRAM(s) Oral <User Schedule>  QUEtiapine 25 milliGRAM(s) Oral at bedtime  sertraline 50 milliGRAM(s) Oral daily  tiotropium 18 MICROgram(s) Capsule 1 Capsule(s) Inhalation daily  tiZANidine 4 milliGRAM(s) Oral <User Schedule>      MEDICATIONS  (PRN):  acetaminophen    Suspension .. 680 milliGRAM(s) Oral every 6 hours PRN Temp greater or equal to 38C (100.4F), Mild Pain (1 - 3)  dextrose 40% Gel 15 Gram(s) Oral once PRN Blood Glucose LESS THAN 70 milliGRAM(s)/deciliter  glucagon  Injectable 1 milliGRAM(s) IntraMuscular once PRN Glucose LESS THAN 70 milligrams/deciliter  nystatin Powder 1 Application(s) Topical two times a day PRN rash/itchiness       ---___---___---___---___---___---     <<<REVIEW OF SYSTEM: >>>    GEN: no fever, no chills, no pain  RESP: no SOB, no cough, no sputum  CVS: no chest pain, no palpitations, no edema  GI: no abdominal pain, no nausea, no vomiting, no constipation, no diarrhea  : no dysurea, no frequency  NEURO: no headache, no dizziness  PSYCH: no depression, not anxious  Derm : no itching, no rash     ---___---___---___---___---___---          <<<  VITAL SIGNS: >>>    T(F): 98.6 (12-14-18 @ 12:13), Max: 100.6 (12-13-18 @ 21:16)  HR: 90 (12-14-18 @ 12:13) (90 - 106)  BP: 121/68 (12-14-18 @ 12:13) (121/68 - 133/61)  RR: 18 (12-14-18 @ 12:13) (17 - 18)  SpO2: 94% (12-14-18 @ 12:13) (94% - 96%)  Wt(kg): --  CAPILLARY BLOOD GLUCOSE      POCT Blood Glucose.: 172 mg/dL (14 Dec 2018 12:10)    I&O's Summary    13 Dec 2018 07:01  -  14 Dec 2018 07:00  --------------------------------------------------------  IN: 600 mL / OUT: 1250 mL / NET: -650 mL    14 Dec 2018 07:01  -  14 Dec 2018 14:54  --------------------------------------------------------  IN: 400 mL / OUT: 0 mL / NET: 400 mL         ---___---___---___---___---___---                       PHYSICAL EXAM:    GEN: A&O X 3 , NAD , comfortable  HEENT: NCAT, PERRL, MMM, no scleral icterus, hearing intact  NECK: Supple, No JVD  CVS: S1S2 , regular , No M/R/G appreciated  PULM: CTA B/L,  no W/R/R appreciated  ABD.: soft. non tender, non distended,  bowel sounds present (+) peg noted   Extrem: intact pulses , no edema noted  Derm: No rash or ecchymosis noted  PSYCH: normal mood, no depression, not anxious     ---___---___---___---___---___---     <<<  LAB AND IMAGING: >>>                          11.7   11.82 )-----------( 328      ( 14 Dec 2018 07:27 )             37.0               12-14    144  |  101  |  58<H>  ----------------------------<  143<H>  3.6   |  24  |  0.63    Ca    9.3      14 Dec 2018 06:51  Mg     2.0     12-14             Urinalysis Basic - ( 14 Dec 2018 08:59 )    Color: x / Appearance: x / SG: x / pH: x  Gluc: x / Ketone: x  / Bili: x / Urobili: x   Blood: x / Protein: x / Nitrite: x   Leuk Esterase: x / RBC: 12 /hpf / WBC 44 /HPF   Sq Epi: x / Non Sq Epi: 3 / Bacteria: x                        [All pertinent / recent available Imaging reports and other labs reviewed]     ---___---___---___---___---___---___ ---___---___---___---___---           <<<  A S S E S S M E N T   A N D   P L A N :  >>>          -GI/DVT Prophylaxis.    --------------------------------------------  Case discussed with  family   Education given on     >>______________________<<      Deniz Bolden .         phone   [phone]

## 2018-12-14 NOTE — PROVIDER CONTACT NOTE (OTHER) - ASSESSMENT
Pt is alert and oriented x1, pt is bedbound, +pseudomonas in urine, warm to touch, Rectal temp 101.3F

## 2018-12-14 NOTE — PROGRESS NOTE ADULT - SUBJECTIVE AND OBJECTIVE BOX
CC: f/u for fever    Patient reports nothing    REVIEW OF SYSTEMS:  All other review of systems cannot be obtained(Comprehensive ROS)    Antimicrobials Day #  :6/7  ceftolozane/tazobactam IVPB 1.5 Gram(s) IV Intermittent every 8 hours  minocycline 100 milliGRAM(s) Oral two times a day    Other Medications Reviewed    T(F): 98.6 (12-14-18 @ 12:13), Max: 100.6 (12-13-18 @ 21:16)  HR: 90 (12-14-18 @ 12:13)  BP: 121/68 (12-14-18 @ 12:13)  RR: 18 (12-14-18 @ 12:13)  SpO2: 94% (12-14-18 @ 12:13)  Wt(kg): --    PHYSICAL EXAM:  General: awake, fetal position no acute distress  Eyes:  anicteric, no conjunctival injection, no discharge  Oropharynx: no lesions or injection 	  Neck: supple, without adenopathy  Lungs: course bs to auscultation  Heart: regular rate and rhythm; no murmur, rubs or gallops  Abdomen: soft, nondistended, nontender, without mass or organomegaly  Skin: no lesions  Extremities: no clubbing, cyanosis, or edema  Neurologic: contracted    LAB RESULTS:                        11.7   11.82 )-----------( 328      ( 14 Dec 2018 07:27 )             37.0     12-14    144  |  101  |  58<H>  ----------------------------<  143<H>  3.6   |  24  |  0.63    Ca    9.3      14 Dec 2018 06:51  Mg     2.0     12-14        Urinalysis Basic - ( 14 Dec 2018 08:59 )    Color: x / Appearance: x / SG: x / pH: x  Gluc: x / Ketone: x  / Bili: x / Urobili: x   Blood: x / Protein: x / Nitrite: x   Leuk Esterase: x / RBC: 12 /hpf / WBC 44 /HPF   Sq Epi: x / Non Sq Epi: 3 / Bacteria: x      MICROBIOLOGY:  RECENT CULTURES:  12-11 @ 09:37 .Blood Blood     No growth to date.          RADIOLOGY REVIEWED:    < from: Xray Chest 1 View- PORTABLE-Urgent (12.08.18 @ 23:46) >  IMPRESSION:   Chronic fibrotic lung changes versus mild interstitial edema.  No right lung focal infiltrates.  Interval increase in left pleural effusion (c/w 12/2/18 plain chest);   left basal atelectasis and/or pneumonia as above.         end of copied text >    Assessment:  Advanced dementia, near vegetative state, peg, gavin, intermittent low grade temps likely from aspiration events, finishing tx for cre pseudomonas gavin  Plan:  Complete one more day of zerbaxa  would set goal of not septic rather than completely afebrile as a more realistic goc  for vq scan

## 2018-12-15 DIAGNOSIS — I82.409 ACUTE EMBOLISM AND THROMBOSIS OF UNSPECIFIED DEEP VEINS OF UNSPECIFIED LOWER EXTREMITY: ICD-10-CM

## 2018-12-15 LAB
ANION GAP SERPL CALC-SCNC: 17 MMOL/L — SIGNIFICANT CHANGE UP (ref 5–17)
BUN SERPL-MCNC: 71 MG/DL — HIGH (ref 7–23)
CALCIUM SERPL-MCNC: 9.4 MG/DL — SIGNIFICANT CHANGE UP (ref 8.4–10.5)
CHLORIDE SERPL-SCNC: 99 MMOL/L — SIGNIFICANT CHANGE UP (ref 96–108)
CO2 SERPL-SCNC: 24 MMOL/L — SIGNIFICANT CHANGE UP (ref 22–31)
CREAT SERPL-MCNC: 0.77 MG/DL — SIGNIFICANT CHANGE UP (ref 0.5–1.3)
CULTURE RESULTS: SIGNIFICANT CHANGE UP
GLUCOSE BLDC GLUCOMTR-MCNC: 149 MG/DL — HIGH (ref 70–99)
GLUCOSE BLDC GLUCOMTR-MCNC: 160 MG/DL — HIGH (ref 70–99)
GLUCOSE BLDC GLUCOMTR-MCNC: 170 MG/DL — HIGH (ref 70–99)
GLUCOSE BLDC GLUCOMTR-MCNC: 180 MG/DL — HIGH (ref 70–99)
GLUCOSE SERPL-MCNC: 139 MG/DL — HIGH (ref 70–99)
HCT VFR BLD CALC: 34.2 % — LOW (ref 34.5–45)
HGB BLD-MCNC: 10.8 G/DL — LOW (ref 11.5–15.5)
MCHC RBC-ENTMCNC: 30.5 PG — SIGNIFICANT CHANGE UP (ref 27–34)
MCHC RBC-ENTMCNC: 31.6 GM/DL — LOW (ref 32–36)
MCV RBC AUTO: 96.6 FL — SIGNIFICANT CHANGE UP (ref 80–100)
PLATELET # BLD AUTO: 334 K/UL — SIGNIFICANT CHANGE UP (ref 150–400)
POTASSIUM SERPL-MCNC: 3.9 MMOL/L — SIGNIFICANT CHANGE UP (ref 3.5–5.3)
POTASSIUM SERPL-SCNC: 3.9 MMOL/L — SIGNIFICANT CHANGE UP (ref 3.5–5.3)
RBC # BLD: 3.54 M/UL — LOW (ref 3.8–5.2)
RBC # FLD: 17.2 % — HIGH (ref 10.3–14.5)
SODIUM SERPL-SCNC: 140 MMOL/L — SIGNIFICANT CHANGE UP (ref 135–145)
SPECIMEN SOURCE: SIGNIFICANT CHANGE UP
WBC # BLD: 11.57 K/UL — HIGH (ref 3.8–10.5)
WBC # FLD AUTO: 11.57 K/UL — HIGH (ref 3.8–10.5)

## 2018-12-15 PROCEDURE — 78582 LUNG VENTILAT&PERFUS IMAGING: CPT | Mod: 26

## 2018-12-15 PROCEDURE — 93970 EXTREMITY STUDY: CPT | Mod: 26

## 2018-12-15 RX ORDER — APIXABAN 2.5 MG/1
10 TABLET, FILM COATED ORAL EVERY 12 HOURS
Qty: 0 | Refills: 0 | Status: COMPLETED | OUTPATIENT
Start: 2018-12-15 | End: 2018-12-22

## 2018-12-15 RX ADMIN — Medication 1 APPLICATION(S): at 05:32

## 2018-12-15 RX ADMIN — BUDESONIDE AND FORMOTEROL FUMARATE DIHYDRATE 2 PUFF(S): 160; 4.5 AEROSOL RESPIRATORY (INHALATION) at 17:49

## 2018-12-15 RX ADMIN — Medication 40 MILLIGRAM(S): at 05:33

## 2018-12-15 RX ADMIN — Medication 3 MILLILITER(S): at 22:05

## 2018-12-15 RX ADMIN — TIZANIDINE 4 MILLIGRAM(S): 4 TABLET ORAL at 09:55

## 2018-12-15 RX ADMIN — Medication 1 APPLICATION(S): at 14:38

## 2018-12-15 RX ADMIN — Medication 680 MILLIGRAM(S): at 06:00

## 2018-12-15 RX ADMIN — TIOTROPIUM BROMIDE 1 CAPSULE(S): 18 CAPSULE ORAL; RESPIRATORY (INHALATION) at 12:35

## 2018-12-15 RX ADMIN — Medication 1.5 MILLIGRAM(S): at 22:05

## 2018-12-15 RX ADMIN — FENTANYL CITRATE 1 PATCH: 50 INJECTION INTRAVENOUS at 08:18

## 2018-12-15 RX ADMIN — Medication 300 MILLIGRAM(S): at 12:35

## 2018-12-15 RX ADMIN — Medication 3 MILLILITER(S): at 05:31

## 2018-12-15 RX ADMIN — CEFTOLOZANE AND TAZOBACTAM 100 GRAM(S): 1; .5 INJECTION, POWDER, LYOPHILIZED, FOR SOLUTION INTRAVENOUS at 21:05

## 2018-12-15 RX ADMIN — Medication 1: at 12:35

## 2018-12-15 RX ADMIN — Medication 680 MILLIGRAM(S): at 20:06

## 2018-12-15 RX ADMIN — Medication 7.5 MILLIGRAM(S): at 09:55

## 2018-12-15 RX ADMIN — Medication 680 MILLIGRAM(S): at 17:47

## 2018-12-15 RX ADMIN — APIXABAN 10 MILLIGRAM(S): 2.5 TABLET, FILM COATED ORAL at 18:51

## 2018-12-15 RX ADMIN — CEFTOLOZANE AND TAZOBACTAM 100 GRAM(S): 1; .5 INJECTION, POWDER, LYOPHILIZED, FOR SOLUTION INTRAVENOUS at 05:33

## 2018-12-15 RX ADMIN — Medication 1 APPLICATION(S): at 22:06

## 2018-12-15 RX ADMIN — Medication 3 MILLILITER(S): at 17:48

## 2018-12-15 RX ADMIN — Medication 680 MILLIGRAM(S): at 06:30

## 2018-12-15 RX ADMIN — FENTANYL CITRATE 1 PATCH: 50 INJECTION INTRAVENOUS at 19:03

## 2018-12-15 RX ADMIN — FENTANYL CITRATE 1 PATCH: 50 INJECTION INTRAVENOUS at 17:48

## 2018-12-15 RX ADMIN — QUETIAPINE FUMARATE 25 MILLIGRAM(S): 200 TABLET, FILM COATED ORAL at 22:05

## 2018-12-15 RX ADMIN — Medication 1: at 18:49

## 2018-12-15 RX ADMIN — MINOCYCLINE HYDROCHLORIDE 100 MILLIGRAM(S): 45 TABLET, EXTENDED RELEASE ORAL at 17:42

## 2018-12-15 RX ADMIN — PANTOPRAZOLE SODIUM 40 MILLIGRAM(S): 20 TABLET, DELAYED RELEASE ORAL at 05:32

## 2018-12-15 RX ADMIN — CEFTOLOZANE AND TAZOBACTAM 100 GRAM(S): 1; .5 INJECTION, POWDER, LYOPHILIZED, FOR SOLUTION INTRAVENOUS at 14:35

## 2018-12-15 RX ADMIN — CLOPIDOGREL BISULFATE 75 MILLIGRAM(S): 75 TABLET, FILM COATED ORAL at 12:35

## 2018-12-15 RX ADMIN — GABAPENTIN 300 MILLIGRAM(S): 400 CAPSULE ORAL at 18:52

## 2018-12-15 RX ADMIN — MINOCYCLINE HYDROCHLORIDE 100 MILLIGRAM(S): 45 TABLET, EXTENDED RELEASE ORAL at 05:33

## 2018-12-15 RX ADMIN — HEPARIN SODIUM 5000 UNIT(S): 5000 INJECTION INTRAVENOUS; SUBCUTANEOUS at 05:33

## 2018-12-15 RX ADMIN — BUDESONIDE AND FORMOTEROL FUMARATE DIHYDRATE 2 PUFF(S): 160; 4.5 AEROSOL RESPIRATORY (INHALATION) at 05:32

## 2018-12-15 RX ADMIN — Medication 1: at 06:36

## 2018-12-15 RX ADMIN — Medication 3 MILLILITER(S): at 12:36

## 2018-12-15 RX ADMIN — TIZANIDINE 4 MILLIGRAM(S): 4 TABLET ORAL at 21:05

## 2018-12-15 RX ADMIN — GABAPENTIN 300 MILLIGRAM(S): 400 CAPSULE ORAL at 05:32

## 2018-12-15 RX ADMIN — Medication 1 APPLICATION(S): at 05:33

## 2018-12-15 RX ADMIN — LISINOPRIL 5 MILLIGRAM(S): 2.5 TABLET ORAL at 05:33

## 2018-12-15 RX ADMIN — FENTANYL CITRATE 1 PATCH: 50 INJECTION INTRAVENOUS at 17:42

## 2018-12-15 RX ADMIN — SERTRALINE 50 MILLIGRAM(S): 25 TABLET, FILM COATED ORAL at 12:35

## 2018-12-15 RX ADMIN — MIRTAZAPINE 7.5 MILLIGRAM(S): 45 TABLET, ORALLY DISINTEGRATING ORAL at 22:05

## 2018-12-15 RX ADMIN — Medication 1 APPLICATION(S): at 17:43

## 2018-12-15 NOTE — PROVIDER CONTACT NOTE (OTHER) - ASSESSMENT
Rectal temp 101.0 F otherwise stable. BP, HR stable. Urine output monitored, adequate. Tylenol via PEG administered.

## 2018-12-15 NOTE — CHART NOTE - NSCHARTNOTEFT_GEN_A_CORE
MEDICINE NP    MYRIAM HEATH  68y Female    Patient is a 68y old  Female who presents with a chief complaint of elevated proBNP (15 Dec 2018 21:22)       > Event Summary:  Notified by RN, Patient with recurrent low grade fever 100.4 Rectally.   Patient seen at bedside with aid present. Awake and alert, confused to time and situation.   Patient NAD, respiration even and unalbored.    -Vital Signs Last 24 Hrs :  T(C): 38 (15 Dec 2018 20:26), Max: 38.3 (15 Dec 2018 05:57)  T(F): 100.4 (15 Dec 2018 20:26), Max: 101 (15 Dec 2018 05:57)  HR: 75 (15 Dec 2018 20:26) (75 - 95)  BP: 102/68 (15 Dec 2018 20:26) (102/68 - 112/54)  RR: 18 (15 Dec 2018 20:26) (18 - 18)  SpO2: 96% (15 Dec 2018 20:26) (93% - 98%)      >A&P:  -Patient is a 67y/o Female with PMHX of Advanced dementia, Rt. 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  Chronic Blackman/ UTI/ Pseudomonas CRE / With recurrent low grade fevers concerning for ?Aspiration; On Zebraxa day 7/7.  New RLE DVT on Eliquis.  Now with recurrent low grade fever.    -Antipyretics /Cooling measures prn  -Repeat Temp now 99.4 Rectally  -C/w Current regimen and monitor closely  -ID on board f/u with same  -Attending to follow        JONAH Workman-BC  Medicine Department  #[id number] MEDICINE NP    MYRIAM HEATH  68y Female    Patient is a 68y old  Female who presents with a chief complaint of elevated proBNP (15 Dec 2018 21:22)       > Event Summary:  Notified by RN, Patient with recurrent low grade fever 100.4 Rectally.   Patient seen at bedside with aid present. Awake and alert, confused to time and situation.   Patient NAD, respiration even and unalbored.    -Vital Signs Last 24 Hrs :  T(C): 38 (15 Dec 2018 20:26), Max: 38.3 (15 Dec 2018 05:57)  T(F): 100.4 (15 Dec 2018 20:26), Max: 101 (15 Dec 2018 05:57)  HR: 75 (15 Dec 2018 20:26) (75 - 95)  BP: 102/68 (15 Dec 2018 20:26) (102/68 - 112/54)  RR: 18 (15 Dec 2018 20:26) (18 - 18)  SpO2: 96% (15 Dec 2018 20:26) (93% - 98%)    >Microbiology:    Culture - Blood (12.14.18 @ 07:28)    Specimen Source: .Blood Blood-Peripheral    Culture Results: No growth to date.    Culture - Blood (12.14.18 @ 07:28)    Specimen Source: .Blood Blood-Peripheral    Culture Results: No growth to date.    Culture - Urine (12.14.18 @ 13:19)    Specimen Source: .Urine Clean Catch (Midstream)    Culture Results:  50,000 - 99,000 CFU/mL Yeast-like cells, presumptively not Candida  albicans        >RADIOLOGY:  < from: Xray Chest 1 View- PORTABLE-Routine (12.14.18 @ 15:28) >  FINDINGS/  IMPRESSION: Limited evaluationsecondary to patient rotation.    Stable left pleural effusion and left basilar atelectasis. Trace right   pleural effusion.    < end of copied text >        >A&P:  -Patient is a 69y/o Female with PMHX of Advanced dementia, Rt. 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  Chronic Blackman/ UTI/ Pseudomonas CRE / With recurrent low grade fevers concerning for ?Aspiration; On Zebraxa day 7/7.  New RLE DVT on Eliquis.  Now with recurrent low grade fever.    -Antipyretics /Cooling measures prn  -Repeat Temp now 99.4 Rectally  -f/u Bld Cx / UCX 12/14  -C/w Current regimen and monitor closely  -ID on board f/u with same  -Attending to follow        Sonia Guthrie, JONAH-BC  Medicine Department  #[id number] MEDICINE NP    MYRIAM HEATH  68y Female    Patient is a 68y old  Female who presents with a chief complaint of elevated proBNP (15 Dec 2018 21:22)       > Event Summary:  Notified by RN, Patient with recurrent low grade fever 100.4 Rectally.   Patient seen at bedside with aid present. Awake and alert, confused to time and situation.   Patient NAD, respiration even and unalbored.    -Vital Signs Last 24 Hrs :  T(C): 38 (15 Dec 2018 20:26), Max: 38.3 (15 Dec 2018 05:57)  T(F): 100.4 (15 Dec 2018 20:26), Max: 101 (15 Dec 2018 05:57)  HR: 75 (15 Dec 2018 20:26) (75 - 95)  BP: 102/68 (15 Dec 2018 20:26) (102/68 - 112/54)  RR: 18 (15 Dec 2018 20:26) (18 - 18)  SpO2: 96% (15 Dec 2018 20:26) (93% - 98%)    >Microbiology:    Culture - Blood (12.14.18 @ 07:28)    Specimen Source: .Blood Blood-Peripheral    Culture Results: No growth to date.    Culture - Blood (12.14.18 @ 07:28)    Specimen Source: .Blood Blood-Peripheral    Culture Results: No growth to date.    Culture - Urine (12.14.18 @ 13:19)    Specimen Source: .Urine Clean Catch (Midstream)    Culture Results:  50,000 - 99,000 CFU/mL Yeast-like cells, presumptively not Candida  albicans        >RADIOLOGY:  < from: Xray Chest 1 View- PORTABLE-Routine (12.14.18 @ 15:28) >  FINDINGS/  IMPRESSION: Limited evaluationsecondary to patient rotation.    Stable left pleural effusion and left basilar atelectasis. Trace right   pleural effusion.    < end of copied text >        >A&P:  -Patient is a 69y/o Female with PMHX of Advanced dementia, Rt. 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  Chronic Blackman/ UTI/ Pseudomonas CRE / With recurrent low grade fevers concerning for ?Aspiration; On Zebraxa day 7/7.  New RLE DVT on Eliquis.  Now with recurrent low grade fever.    1) Fever -low grade -recurrent : Disease process as above +/- New RLE DVT per ID   -Antipyretics /Cooling measures prn  -Repeat Temp now 99.4 Rectally  -f/u Bld Cx / UCX 12/14  -C/w Current regimen and monitor closely  -ID on board f/u with same  -Attending to follow        JONAH Workman-BC  Medicine Department  #[id number] MEDICINE NP    MYRIAM HEATH  68y Female    Patient is a 68y old  Female who presents with a chief complaint of elevated proBNP (15 Dec 2018 21:22)       > Event Summary:  Notified by RN, Patient with recurrent low grade fever 100.4 Rectally.   Patient seen at bedside with aid present. Awake and alert, confused to time and situation.   Patient NAD, respiration even and unlabored.    -Vital Signs Last 24 Hrs :  T(C): 38 (15 Dec 2018 20:26), Max: 38.3 (15 Dec 2018 05:57)  T(F): 100.4 (15 Dec 2018 20:26), Max: 101 (15 Dec 2018 05:57)  HR: 75 (15 Dec 2018 20:26) (75 - 95)  BP: 102/68 (15 Dec 2018 20:26) (102/68 - 112/54)  RR: 18 (15 Dec 2018 20:26) (18 - 18)  SpO2: 96% (15 Dec 2018 20:26) (93% - 98%)    >Microbiology:    Culture - Blood (12.14.18 @ 07:28)    Specimen Source: .Blood Blood-Peripheral    Culture Results: No growth to date.    Culture - Blood (12.14.18 @ 07:28)    Specimen Source: .Blood Blood-Peripheral    Culture Results: No growth to date.    Culture - Urine (12.14.18 @ 13:19)    Specimen Source: .Urine Clean Catch (Midstream)    Culture Results:  50,000 - 99,000 CFU/mL Yeast-like cells, presumptively not Candida  albicans        >RADIOLOGY:  < from: Xray Chest 1 View- PORTABLE-Routine (12.14.18 @ 15:28) >  FINDINGS/  IMPRESSION: Limited evaluation secondary to patient rotation.    Stable left pleural effusion and left basilar atelectasis. Trace right   pleural effusion.    < end of copied text >        >A&P:  -Patient is a 67y/o Female with PMHX of Advanced dementia, Rt. 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  Chronic Blackman/ UTI/ Pseudomonas CRE / With recurrent low grade fevers concerning for ?Aspiration; On Zerbaxa.  New RLE DVT on Eliquis.  Now with recurrent low grade fever.    1) Fever -low grade -recurrent : Disease process as above +/- New RLE DVT per ID   -Antipyretics /Cooling measures prn  -Repeat Temp now 99.4 Rectally  -f/u Bld Cx / UCX 12/14  -C/w Current regimen Zerbaxa day 7/7, and f/u with ID   -Monitor closely  -ID on board f/u with same  -Attending to follow        JONAH Workman-BC  Medicine Department  #[id number]

## 2018-12-15 NOTE — PROGRESS NOTE ADULT - SUBJECTIVE AND OBJECTIVE BOX
---___---___---___---___---___---___ ---___---___---___---___---___---___---___---___---___---                    <<<  M E D I C A L   A T T E N D I N G    F O L L O W    U P   N O T E  >>>    - Patient seen and examined by me approximately thirty minutes ago.  - In summary, patient is a 68y year old Female who origianlly presented with elevated bnp . now with fever and new onset dvt   - Patient today overall doing ok, comfortable, eating OK.     ---___---___---___---___---___---      <<<  MEDICATIONS:  >>>    MEDICATIONS  (STANDING):  ALBUTerol/ipratropium for Nebulization 3 milliLiter(s) Nebulizer every 6 hours  apixaban 10 milliGRAM(s) Oral every 12 hours  AQUAPHOR (petrolatum Ointment) 1 Application(s) Topical three times a day  buDESOnide 160 MICROgram(s)/formoterol 4.5 MICROgram(s) Inhaler 2 Puff(s) Inhalation two times a day  ceftolozane/tazobactam IVPB 1.5 Gram(s) IV Intermittent every 8 hours  clobetasol 0.05% Ointment 1 Application(s) Topical two times a day  clonazePAM Tablet 1.5 milliGRAM(s) Oral at bedtime  dextrose 5%. 1000 milliLiter(s) (50 mL/Hr) IV Continuous <Continuous>  dextrose 50% Injectable 12.5 Gram(s) IV Push once  dextrose 50% Injectable 25 Gram(s) IV Push once  dextrose 50% Injectable 25 Gram(s) IV Push once  fentaNYL   Patch  12 MICROgram(s)/Hr 1 Patch Transdermal every 72 hours  ferrous    sulfate Liquid 300 milliGRAM(s) Enteral Tube daily  furosemide    Tablet 40 milliGRAM(s) Oral daily  gabapentin   Solution 300 milliGRAM(s) Oral two times a day  heparin  Injectable 5000 Unit(s) SubCutaneous every 12 hours  insulin lispro (HumaLOG) corrective regimen sliding scale   SubCutaneous every 6 hours  lisinopril 5 milliGRAM(s) Oral daily  medical marijuana oil 1 milliLiter(s),medical marijuana oil 1 milliLiter(s) 1 milliLiter(s) SubLingual <User Schedule>  minocycline 100 milliGRAM(s) Oral two times a day  mirtazapine 7.5 milliGRAM(s) Oral at bedtime  pantoprazole   Suspension 40 milliGRAM(s) Oral before breakfast  predniSONE   Tablet 7.5 milliGRAM(s) Oral <User Schedule>  QUEtiapine 25 milliGRAM(s) Oral at bedtime  sertraline 50 milliGRAM(s) Oral daily  tiotropium 18 MICROgram(s) Capsule 1 Capsule(s) Inhalation daily  tiZANidine 4 milliGRAM(s) Oral <User Schedule>      MEDICATIONS  (PRN):  acetaminophen    Suspension .. 680 milliGRAM(s) Oral every 6 hours PRN Temp greater or equal to 38C (100.4F), Mild Pain (1 - 3)  dextrose 40% Gel 15 Gram(s) Oral once PRN Blood Glucose LESS THAN 70 milliGRAM(s)/deciliter  glucagon  Injectable 1 milliGRAM(s) IntraMuscular once PRN Glucose LESS THAN 70 milligrams/deciliter  nystatin Powder 1 Application(s) Topical two times a day PRN rash/itchiness       ---___---___---___---___---___---     <<<REVIEW OF SYSTEM: >>>    GEN: no fever, no chills, no pain  RESP: no SOB, no cough, no sputum  CVS: no chest pain, no palpitations, no edema  GI: no abdominal pain, no nausea, no vomiting, no constipation, no diarrhea  : no dysurea, no frequency  NEURO: no headache, no dizziness  PSYCH: no depression, not anxious  Derm : no itching, no rash     ---___---___---___---___---___---          <<<  VITAL SIGNS: >>>    T(F): 99.8 (12-15-18 @ 07:51), Max: 101.3 (12-14-18 @ 18:04)  HR: 95 (12-15-18 @ 04:09) (95 - 112)  BP: 112/54 (12-15-18 @ 04:09) (112/54 - 118/67)  RR: 18 (12-15-18 @ 04:09) (18 - 18)  SpO2: 93% (12-15-18 @ 04:09) (93% - 93%)  Wt(kg): --  CAPILLARY BLOOD GLUCOSE      POCT Blood Glucose.: 180 mg/dL (15 Dec 2018 12:02)    I&O's Summary    14 Dec 2018 07:01  -  15 Dec 2018 07:00  --------------------------------------------------------  IN: 1350 mL / OUT: 900 mL / NET: 450 mL         ---___---___---___---___---___---                       PHYSICAL EXAM:    GEN: A&O X 3 , NAD , comfortable  HEENT: NCAT, PERRL, MMM, no scleral icterus, hearing intact  NECK: Supple, No JVD  CVS: S1S2 , regular , No M/R/G appreciated  PULM: CTA B/L,  no W/R/R appreciated  ABD.: soft. non tender, non distended,  bowel sounds present peg present   Extrem: intact pulses , no edema noted  Derm: No rash or ecchymosis noted      ---___---___---___---___---___---     <<<  LAB AND IMAGING: >>>                          10.8   11.57 )-----------( 334      ( 15 Dec 2018 07:52 )             34.2               12-15    140  |  99  |  71<H>  ----------------------------<  139<H>  3.9   |  24  |  0.77    Ca    9.4      15 Dec 2018 06:13  Mg     2.0     12-14             Urinalysis Basic - ( 14 Dec 2018 08:59 )    Color: x / Appearance: x / SG: x / pH: x  Gluc: x / Ketone: x  / Bili: x / Urobili: x   Blood: x / Protein: x / Nitrite: x   Leuk Esterase: x / RBC: 12 /hpf / WBC 44 /HPF   Sq Epi: x / Non Sq Epi: 3 / Bacteria: x                        [All pertinent / recent available Imaging reports and other labs reviewed]     ---___---___---___---___---___---___ ---___---___---___---___---           <<<  A S S E S S M E N T   A N D   P L A N :  >>>          -GI/DVT Prophylaxis.    --------------------------------------------       >>______________________<<      Deniz Bolden .         phone   [phone]

## 2018-12-15 NOTE — PROGRESS NOTE ADULT - SUBJECTIVE AND OBJECTIVE BOX
CC: f/u for uti, fever, mrsa right hip infection    Patient reports  nothing not verbal  REVIEW OF SYSTEMS:  All other review of systems cannot get(Comprehensive ROS)    Antimicrobials Day #  :  ceftolozane/tazobactam IVPB 1.5 Gram(s) IV Intermittent every 8 hours  minocycline 100 milliGRAM(s) Oral two times a day    Other Medications Reviewed    T(F): 100.4 (12-15-18 @ 20:02), Max: 101 (12-15-18 @ 05:57)  HR: 75 (12-15-18 @ 20:26)  BP: 102/68 (12-15-18 @ 20:26)  RR: 18 (12-15-18 @ 20:26)  SpO2: 96% (12-15-18 @ 20:26)  Wt(kg): --    PHYSICAL EXAM:  General: alert, no acute distress  Eyes:  anicteric, no conjunctival injection, no discharge  Oropharynx: no lesions or injection 	  Neck: supple, without adenopathy  Lungs: clear to auscultation  Heart: regular rate and rhythm; no murmur, rubs or gallops  Abdomen: soft, nondistended, nontender, without mass or organomegaly  Skin: no lesions  Extremities: no clubbing, cyanosis, or edema  Neurologic: alert, contracted  LAB RESULTS:                        10.8   11.57 )-----------( 334      ( 15 Dec 2018 07:52 )             34.2     12-15    140  |  99  |  71<H>  ----------------------------<  139<H>  3.9   |  24  |  0.77    Ca    9.4      15 Dec 2018 06:13  Mg     2.0     12-14        Urinalysis Basic - ( 14 Dec 2018 08:59 )    Color: x / Appearance: x / SG: x / pH: x  Gluc: x / Ketone: x  / Bili: x / Urobili: x   Blood: x / Protein: x / Nitrite: x   Leuk Esterase: x / RBC: 12 /hpf / WBC 44 /HPF   Sq Epi: x / Non Sq Epi: 3 / Bacteria: x      MICROBIOLOGY:  RECENT CULTURES:  12-14 @ 13:19 .Urine Clean Catch (Midstream)     50,000 - 99,000 CFU/mL Yeast-like cells, presumptively not Candida  albicans      12-14 @ 07:28 .Blood Blood-Peripheral     No growth to date.      12-11 @ 09:37 .Blood Blood     No growth to date.          RADIOLOGY REVIEWED:    < from: [facility] Duplex Lower Ext Vein Scan, Bilat (12.15.18 @ 14:01) >  IMPRESSION:     Acute, above the knee deep venous thrombosis extending from the right   common femoral vein through the popliteal vein and into the right   gastrocnemius vein.    < from: NM Pulmonary Ventilation/Perfusion Scan (12.15.18 @ 10:13) >    IMPRESSION: Very low probability of pulmonary embolus.      Assessment:  Advanced dementia, right thr mrsa infection s/p leisa and spacer on suppressive minocycline, recurrent aspiration events, chronic gavin finishing tx for cre pseudomonas cauti, fever now from new dx dvt rle  Plan: stop zerbaxa after todays dose  dvt per primary team

## 2018-12-15 NOTE — CHART NOTE - NSCHARTNOTEFT_GEN_A_CORE
Contacted by Vascular Lab and informed of Positive DVT of right Lower extremity  Dr Bolden and Dr WAQAR Whitehead informed  Instructed to discontinue Plavix and start full dose Eliquis (10mg BID x 7 days then 5mg BID) - as per cardiology, Dr Whitehead.  Patient's  and daughter updated

## 2018-12-16 LAB
ANION GAP SERPL CALC-SCNC: 19 MMOL/L — HIGH (ref 5–17)
BUN SERPL-MCNC: 81 MG/DL — HIGH (ref 7–23)
CALCIUM SERPL-MCNC: 9 MG/DL — SIGNIFICANT CHANGE UP (ref 8.4–10.5)
CHLORIDE SERPL-SCNC: 100 MMOL/L — SIGNIFICANT CHANGE UP (ref 96–108)
CO2 SERPL-SCNC: 23 MMOL/L — SIGNIFICANT CHANGE UP (ref 22–31)
CREAT SERPL-MCNC: 0.86 MG/DL — SIGNIFICANT CHANGE UP (ref 0.5–1.3)
CULTURE RESULTS: SIGNIFICANT CHANGE UP
CULTURE RESULTS: SIGNIFICANT CHANGE UP
GLUCOSE BLDC GLUCOMTR-MCNC: 129 MG/DL — HIGH (ref 70–99)
GLUCOSE BLDC GLUCOMTR-MCNC: 158 MG/DL — HIGH (ref 70–99)
GLUCOSE BLDC GLUCOMTR-MCNC: 159 MG/DL — HIGH (ref 70–99)
GLUCOSE BLDC GLUCOMTR-MCNC: 193 MG/DL — HIGH (ref 70–99)
GLUCOSE SERPL-MCNC: 117 MG/DL — HIGH (ref 70–99)
HCT VFR BLD CALC: 29.8 % — LOW (ref 34.5–45)
HGB BLD-MCNC: 9.5 G/DL — LOW (ref 11.5–15.5)
MCHC RBC-ENTMCNC: 30.6 PG — SIGNIFICANT CHANGE UP (ref 27–34)
MCHC RBC-ENTMCNC: 31.9 GM/DL — LOW (ref 32–36)
MCV RBC AUTO: 96.1 FL — SIGNIFICANT CHANGE UP (ref 80–100)
PLATELET # BLD AUTO: 300 K/UL — SIGNIFICANT CHANGE UP (ref 150–400)
POTASSIUM SERPL-MCNC: 3.8 MMOL/L — SIGNIFICANT CHANGE UP (ref 3.5–5.3)
POTASSIUM SERPL-SCNC: 3.8 MMOL/L — SIGNIFICANT CHANGE UP (ref 3.5–5.3)
RBC # BLD: 3.1 M/UL — LOW (ref 3.8–5.2)
RBC # FLD: 17 % — HIGH (ref 10.3–14.5)
SODIUM SERPL-SCNC: 142 MMOL/L — SIGNIFICANT CHANGE UP (ref 135–145)
SPECIMEN SOURCE: SIGNIFICANT CHANGE UP
SPECIMEN SOURCE: SIGNIFICANT CHANGE UP
WBC # BLD: 9.73 K/UL — SIGNIFICANT CHANGE UP (ref 3.8–10.5)
WBC # FLD AUTO: 9.73 K/UL — SIGNIFICANT CHANGE UP (ref 3.8–10.5)

## 2018-12-16 PROCEDURE — 99233 SBSQ HOSP IP/OBS HIGH 50: CPT

## 2018-12-16 RX ORDER — FENTANYL CITRATE 50 UG/ML
1 INJECTION INTRAVENOUS
Qty: 0 | Refills: 0 | Status: DISCONTINUED | OUTPATIENT
Start: 2018-12-16 | End: 2018-12-21

## 2018-12-16 RX ADMIN — APIXABAN 10 MILLIGRAM(S): 2.5 TABLET, FILM COATED ORAL at 05:54

## 2018-12-16 RX ADMIN — FENTANYL CITRATE 1 PATCH: 50 INJECTION INTRAVENOUS at 19:00

## 2018-12-16 RX ADMIN — SERTRALINE 50 MILLIGRAM(S): 25 TABLET, FILM COATED ORAL at 14:10

## 2018-12-16 RX ADMIN — LISINOPRIL 5 MILLIGRAM(S): 2.5 TABLET ORAL at 05:55

## 2018-12-16 RX ADMIN — Medication 1 APPLICATION(S): at 05:55

## 2018-12-16 RX ADMIN — Medication 1 APPLICATION(S): at 05:56

## 2018-12-16 RX ADMIN — GABAPENTIN 300 MILLIGRAM(S): 400 CAPSULE ORAL at 05:55

## 2018-12-16 RX ADMIN — Medication 1: at 19:05

## 2018-12-16 RX ADMIN — MINOCYCLINE HYDROCHLORIDE 100 MILLIGRAM(S): 45 TABLET, EXTENDED RELEASE ORAL at 18:31

## 2018-12-16 RX ADMIN — MINOCYCLINE HYDROCHLORIDE 100 MILLIGRAM(S): 45 TABLET, EXTENDED RELEASE ORAL at 05:55

## 2018-12-16 RX ADMIN — FENTANYL CITRATE 1 PATCH: 50 INJECTION INTRAVENOUS at 09:15

## 2018-12-16 RX ADMIN — Medication 1 APPLICATION(S): at 21:04

## 2018-12-16 RX ADMIN — BUDESONIDE AND FORMOTEROL FUMARATE DIHYDRATE 2 PUFF(S): 160; 4.5 AEROSOL RESPIRATORY (INHALATION) at 18:32

## 2018-12-16 RX ADMIN — TIZANIDINE 4 MILLIGRAM(S): 4 TABLET ORAL at 09:18

## 2018-12-16 RX ADMIN — Medication 680 MILLIGRAM(S): at 18:33

## 2018-12-16 RX ADMIN — Medication 3 MILLILITER(S): at 05:55

## 2018-12-16 RX ADMIN — GABAPENTIN 300 MILLIGRAM(S): 400 CAPSULE ORAL at 18:30

## 2018-12-16 RX ADMIN — BUDESONIDE AND FORMOTEROL FUMARATE DIHYDRATE 2 PUFF(S): 160; 4.5 AEROSOL RESPIRATORY (INHALATION) at 05:55

## 2018-12-16 RX ADMIN — Medication 40 MILLIGRAM(S): at 05:54

## 2018-12-16 RX ADMIN — MIRTAZAPINE 7.5 MILLIGRAM(S): 45 TABLET, ORALLY DISINTEGRATING ORAL at 21:04

## 2018-12-16 RX ADMIN — Medication 1 APPLICATION(S): at 12:32

## 2018-12-16 RX ADMIN — Medication 3 MILLILITER(S): at 18:32

## 2018-12-16 RX ADMIN — Medication 300 MILLIGRAM(S): at 14:10

## 2018-12-16 RX ADMIN — APIXABAN 10 MILLIGRAM(S): 2.5 TABLET, FILM COATED ORAL at 18:31

## 2018-12-16 RX ADMIN — Medication 1: at 00:24

## 2018-12-16 RX ADMIN — Medication 3 MILLILITER(S): at 13:09

## 2018-12-16 RX ADMIN — QUETIAPINE FUMARATE 25 MILLIGRAM(S): 200 TABLET, FILM COATED ORAL at 21:04

## 2018-12-16 RX ADMIN — TIOTROPIUM BROMIDE 1 CAPSULE(S): 18 CAPSULE ORAL; RESPIRATORY (INHALATION) at 14:10

## 2018-12-16 RX ADMIN — Medication 7.5 MILLIGRAM(S): at 09:18

## 2018-12-16 RX ADMIN — TIZANIDINE 4 MILLIGRAM(S): 4 TABLET ORAL at 21:04

## 2018-12-16 RX ADMIN — Medication 1 APPLICATION(S): at 18:29

## 2018-12-16 RX ADMIN — Medication 1: at 12:32

## 2018-12-16 RX ADMIN — CEFTOLOZANE AND TAZOBACTAM 100 GRAM(S): 1; .5 INJECTION, POWDER, LYOPHILIZED, FOR SOLUTION INTRAVENOUS at 05:56

## 2018-12-16 RX ADMIN — Medication 1.5 MILLIGRAM(S): at 21:04

## 2018-12-16 RX ADMIN — Medication 680 MILLIGRAM(S): at 18:32

## 2018-12-16 RX ADMIN — PANTOPRAZOLE SODIUM 40 MILLIGRAM(S): 20 TABLET, DELAYED RELEASE ORAL at 05:55

## 2018-12-16 NOTE — PROGRESS NOTE ADULT - ASSESSMENT
68 year-old woman with Alzheimer's dementia seen to have volume overload and biventricular congestive heart failure.  TTE on this admission shows reduced LVEF, elevated pulmonary pressures.    Given severely reduced LVEF, plan to continue ACEi and uptitrate as blood pressure permits.  Will defer starting beta-blocker in this patient as she has baseline restrictive airway disease and is beta-agonist inhalers. Can reconsider low dose carvedilol as outpatient.    Patient with recurrent fevers - follow-up ID recommendations for antibiotics (currently being treated for MDR pseudomonas).  RLE DVT could be etiology of fevers.    Continue anticoagulation with apixaban 10mg BID for seven days followed by 5mg BID thereafter.    Discharge planning per primary team.

## 2018-12-16 NOTE — PROGRESS NOTE ADULT - SUBJECTIVE AND OBJECTIVE BOX
---___---___---___---___---___---___ ---___---___---___---___---___---___---___---___---___---                    <<<  M E D I C A L   A T T E N D I N G    F O L L O W    U P   N O T E  >>>    - Patient seen and examined by me approximately thirty minutes ago.  - In summary, patient is a 68y year old Female who origianlly presented with elevae bnp now found to have dvt   -      ---___---___---___---___---___---      <<<  MEDICATIONS:  >>>    MEDICATIONS  (STANDING):  ALBUTerol/ipratropium for Nebulization 3 milliLiter(s) Nebulizer every 6 hours  apixaban 10 milliGRAM(s) Oral every 12 hours  AQUAPHOR (petrolatum Ointment) 1 Application(s) Topical three times a day  buDESOnide 160 MICROgram(s)/formoterol 4.5 MICROgram(s) Inhaler 2 Puff(s) Inhalation two times a day  clobetasol 0.05% Ointment 1 Application(s) Topical two times a day  clonazePAM Tablet 1.5 milliGRAM(s) Oral at bedtime  dextrose 5%. 1000 milliLiter(s) (50 mL/Hr) IV Continuous <Continuous>  dextrose 50% Injectable 12.5 Gram(s) IV Push once  dextrose 50% Injectable 25 Gram(s) IV Push once  dextrose 50% Injectable 25 Gram(s) IV Push once  ferrous    sulfate Liquid 300 milliGRAM(s) Enteral Tube daily  furosemide    Tablet 40 milliGRAM(s) Oral daily  gabapentin   Solution 300 milliGRAM(s) Oral two times a day  insulin lispro (HumaLOG) corrective regimen sliding scale   SubCutaneous every 6 hours  lisinopril 5 milliGRAM(s) Oral daily  medical marijuana oil 1 milliLiter(s),medical marijuana oil 1 milliLiter(s) 1 milliLiter(s) SubLingual <User Schedule>  minocycline 100 milliGRAM(s) Oral two times a day  mirtazapine 7.5 milliGRAM(s) Oral at bedtime  pantoprazole   Suspension 40 milliGRAM(s) Oral before breakfast  predniSONE   Tablet 7.5 milliGRAM(s) Oral <User Schedule>  QUEtiapine 25 milliGRAM(s) Oral at bedtime  sertraline 50 milliGRAM(s) Oral daily  tiotropium 18 MICROgram(s) Capsule 1 Capsule(s) Inhalation daily  tiZANidine 4 milliGRAM(s) Oral <User Schedule>      MEDICATIONS  (PRN):  acetaminophen    Suspension .. 680 milliGRAM(s) Oral every 6 hours PRN Temp greater or equal to 38C (100.4F), Mild Pain (1 - 3)  dextrose 40% Gel 15 Gram(s) Oral once PRN Blood Glucose LESS THAN 70 milliGRAM(s)/deciliter  glucagon  Injectable 1 milliGRAM(s) IntraMuscular once PRN Glucose LESS THAN 70 milligrams/deciliter  nystatin Powder 1 Application(s) Topical two times a day PRN rash/itchiness       ---___---___---___---___---___---     <<<REVIEW OF SYSTEM: >>>    GEN: no fever, no chills, no pain  RESP: no SOB, no cough, no sputum  CVS: no chest pain, no palpitations, no edema  GI: no abdominal pain, no nausea, no vomiting, no constipation, no diarrhea  : no dysurea, no frequency  NEURO: no headache, no dizziness  PSYCH: no depression, not anxious  Derm : no itching, no rash     ---___---___---___---___---___---          <<<  VITAL SIGNS: >>>    T(F): 97.6 (12-16-18 @ 06:22), Max: 100.4 (12-15-18 @ 18:02)  HR: 82 (12-16-18 @ 04:36) (75 - 84)  BP: 102/63 (12-16-18 @ 04:36) (102/63 - 107/62)  RR: 18 (12-16-18 @ 04:36) (18 - 18)  SpO2: 96% (12-16-18 @ 04:36) (96% - 98%)  Wt(kg): --  CAPILLARY BLOOD GLUCOSE      POCT Blood Glucose.: 129 mg/dL (16 Dec 2018 06:07)    I&O's Summary    15 Dec 2018 07:01  -  16 Dec 2018 07:00  --------------------------------------------------------  IN: 650 mL / OUT: 750 mL / NET: -100 mL         ---___---___---___---___---___---                       PHYSICAL EXAM:    GEN: A&O X 3 , NAD , comfortable  HEENT: NCAT, PERRL, MMM, no scleral icterus, hearing intact  NECK: Supple, No JVD  CVS: S1S2 , regular , No M/R/G appreciated  PULM: CTA B/L,  no W/R/R appreciated  ABD.: soft. non tender, non distended,  bowel sounds present  Extrem: intact pulses , no edema noted  Derm: No rash or ecchymosis noted  PSYCH: normal mood, no depression, not anxious     ---___---___---___---___---___---     <<<  LAB AND IMAGING: >>>                          9.5    9.73  )-----------( 300      ( 16 Dec 2018 06:34 )             29.8               12-16    142  |  100  |  81<H>  ----------------------------<  117<H>  3.8   |  23  |  0.86    Ca    9.0      16 Dec 2018 05:58             Urinalysis Basic - ( 14 Dec 2018 08:59 )    Color: x / Appearance: x / SG: x / pH: x  Gluc: x / Ketone: x  / Bili: x / Urobili: x   Blood: x / Protein: x / Nitrite: x   Leuk Esterase: x / RBC: 12 /hpf / WBC 44 /HPF   Sq Epi: x / Non Sq Epi: 3 / Bacteria: x                        [All pertinent / recent available Imaging reports and other labs reviewed]     ---___---___---___---___---___---___ ---___---___---___---___---           <<<  A S S E S S M E N T   A N D   P L A N :  >>>          -GI/DVT Prophylaxis.    --------------------------------------------     >>______________________<<      Deniz Bolden .         phone   [phone]

## 2018-12-16 NOTE — PROGRESS NOTE ADULT - SUBJECTIVE AND OBJECTIVE BOX
HPI:  Patient noted to have RLE DVT; low probability of PE.  Started on anticoagulation without bleeding complication.    Review Of Systems:       Cannot assess review of systems due to dementia    Medications:  acetaminophen    Suspension .. 680 milliGRAM(s) Oral every 6 hours PRN  ALBUTerol/ipratropium for Nebulization 3 milliLiter(s) Nebulizer every 6 hours  apixaban 10 milliGRAM(s) Oral every 12 hours  AQUAPHOR (petrolatum Ointment) 1 Application(s) Topical three times a day  buDESOnide 160 MICROgram(s)/formoterol 4.5 MICROgram(s) Inhaler 2 Puff(s) Inhalation two times a day  clobetasol 0.05% Ointment 1 Application(s) Topical two times a day  clonazePAM Tablet 1.5 milliGRAM(s) Oral at bedtime  dextrose 40% Gel 15 Gram(s) Oral once PRN  dextrose 5%. 1000 milliLiter(s) IV Continuous <Continuous>  dextrose 50% Injectable 12.5 Gram(s) IV Push once  dextrose 50% Injectable 25 Gram(s) IV Push once  dextrose 50% Injectable 25 Gram(s) IV Push once  fentaNYL   Patch  12 MICROgram(s)/Hr 1 Patch Transdermal every 72 hours  ferrous    sulfate Liquid 300 milliGRAM(s) Enteral Tube daily  furosemide    Tablet 40 milliGRAM(s) Oral daily  gabapentin   Solution 300 milliGRAM(s) Oral two times a day  glucagon  Injectable 1 milliGRAM(s) IntraMuscular once PRN  insulin lispro (HumaLOG) corrective regimen sliding scale   SubCutaneous every 6 hours  lisinopril 5 milliGRAM(s) Oral daily  medical marijuana oil 1 milliLiter(s),medical marijuana oil 1 milliLiter(s) 1 milliLiter(s) SubLingual <User Schedule>  minocycline 100 milliGRAM(s) Oral two times a day  mirtazapine 7.5 milliGRAM(s) Oral at bedtime  nystatin Powder 1 Application(s) Topical two times a day PRN  pantoprazole   Suspension 40 milliGRAM(s) Oral before breakfast  predniSONE   Tablet 7.5 milliGRAM(s) Oral <User Schedule>  QUEtiapine 25 milliGRAM(s) Oral at bedtime  sertraline 50 milliGRAM(s) Oral daily  tiotropium 18 MICROgram(s) Capsule 1 Capsule(s) Inhalation daily  tiZANidine 4 milliGRAM(s) Oral <User Schedule>    PAST MEDICAL & SURGICAL HISTORY:  CVA (cerebral vascular accident)  Fatty pancreas  PNA (pneumonia)  Pulmonary HTN  IGT (impaired glucose tolerance)  Ulcerative colitis  Acid reflux  Anxiety  Depression  Mouth sores  HLD (hyperlipidemia)  Asthma  Humeral head fracture  H/O: hysterectomy  H/O cataract extraction, left  History of knee replacement    Vitals:  T(C): 36.4 (12-16-18 @ 06:22), Max: 38 (12-15-18 @ 18:02)  HR: 82 (12-16-18 @ 11:35) (75 - 84)  BP: 104/68 (12-16-18 @ 11:35) (102/63 - 107/62)  BP(mean): --  RR: 18 (12-16-18 @ 11:35) (18 - 18)  SpO2: 94% (12-16-18 @ 11:35) (94% - 98%)  Wt(kg): --  Daily     Daily   I&O's Summary    15 Dec 2018 07:01  -  16 Dec 2018 07:00  --------------------------------------------------------  IN: 650 mL / OUT: 750 mL / NET: -100 mL        Physical Exam:  Appearance: appears older than stated age; bedbound, demented; no acute distress  Eyes: PERRL, EOMI, pink conjunctiva  HENT: Normal oral mucosa  Cardiovascular: RRR, S1, S2; no LE edema bilaterally; normal JVP  Respiratory: poor inspiratory effort  Gastrointestinal: soft, non-tender, non-distended with normal bowel sounds; +PEG  Musculoskeletal: Bedbound, contracted  Neurologic: cranial nerves grossly intact  Lymphatic: No lymphadenopathy  Psychiatry: awake and alert  Skin: No rashes, ecchymoses, or cyanosis                        9.5    9.73  )-----------( 300      ( 16 Dec 2018 06:34 )             29.8     12-16    142  |  100  |  81<H>  ----------------------------<  117<H>  3.8   |  23  |  0.86    Ca    9.0      16 Dec 2018 05:58            Echo: < from: Transthoracic Echocardiogram (12.03.18 @ 11:23) >  ------------------------------------------------------------------------  Conclusions:  1. Mitral annular calcification, otherwise normal mitral  valve. Moderate-severe mitral regurgitation (vena contracta  0.8 cm)  2. Calcified trileafletaortic valve with normal opening.  Peak transaortic valve gradient equals 3 mm Hg, mean  transaortic valve gradient equals 1 mm Hg, aortic valve  velocity time integral equals 13 cm. Moderate aortic  regurgitation  3. Moderately dilated left atrium.LA volume index = 45  cc/m2.  4. Severe global left ventricular systolic dysfunction.  5. Increased E/e'  is consistent with elevated left  ventricular filling pressure.  6. Moderate right atrial enlargement. Inferior vena cava is  dilated (>=2.5cm) with normal respiratory variability  consistent with right atrial pressure 16-20mm Hg.  7. Right ventricular enlargement with decreased right  ventricular systolic function.  8. Normal tricuspid valve. Severe tricuspid regurgitation.  9. Estimated pulmonary artery systolic pressure equals 82  mm Hg, assuming right atrial pressure equals 8 mm Hg,  consistent with severe pulmonary pressures.  10. Color Doppler demonstrates evidence of left to right  shunt  ------------------------------------------------------------------------  Confirmed on  12/3/2018 - 16:37:07 by Margaret Khan M.D.  ------------------------------------------------------------------------    < end of copied text >    Imaging: < from: Xray Chest 1 View- PORTABLE-Urgent (12.02.18 @ 17:19) >  IMPRESSION:   Limited study due to patient positioning.  Trace right pleural effusion and likely left pleural effusion. Question   mild pulmonary edema.    HUAN JACOBS M.D., RADIOLOGY RESIDENT  This document has been electronically signed.  TONYA AARON M.D., ATTENDING RADIOLOGIST  This document has been electronically signed. Dec  2 2018  9:17PM      < end of copied text >    < from: [facility] Duplex Lower Ext Vein Scan, Bilat (12.15.18 @ 14:01) >  IMPRESSION:     Acute, above the knee deep venous thrombosis extending from the right   common femoral vein through the popliteal vein and into the right   gastrocnemius vein.    No left lower extremity DVT.    The above findings were discussed with NP Connor by Dr. Tapia on   12/15/2018 2:43 PM, with read-back verification.    YELENA TAPIA M.D., RADIOLOGY RESIDENT  This document has been electronically signed.  PRIMO MURILLO M.D., ATTENDING RADIOLOGIST  This document has been electronically signed. Dec 15 2018  3:55PM        < end of copied text >    < from: NM Pulmonary Ventilation/Perfusion Scan (12.15.18 @ 10:13) >  FINDINGS: There is heterogeneous distribution of radiopharmaceutical in   both lungs on the ventilation and perfusion images. There are no   segmental perfusion defects in either lung. No significant interval   change since the previous study of 12/3/2018.    IMPRESSION: Very low probability of pulmonary embolus.    KRYSTYNA JULIAN M.D., CHIEF OF NUCLEAR MEDICINE  This document has been electronically signed. Dec 15 2018 10:15AM    < end of copied text >    Interpretation of Telemetry: sinus rhythm

## 2018-12-17 DIAGNOSIS — I27.20 PULMONARY HYPERTENSION, UNSPECIFIED: ICD-10-CM

## 2018-12-17 DIAGNOSIS — F32.9 MAJOR DEPRESSIVE DISORDER, SINGLE EPISODE, UNSPECIFIED: ICD-10-CM

## 2018-12-17 LAB
ANION GAP SERPL CALC-SCNC: 19 MMOL/L — HIGH (ref 5–17)
BUN SERPL-MCNC: 88 MG/DL — HIGH (ref 7–23)
CALCIUM SERPL-MCNC: 9.3 MG/DL — SIGNIFICANT CHANGE UP (ref 8.4–10.5)
CHLORIDE SERPL-SCNC: 102 MMOL/L — SIGNIFICANT CHANGE UP (ref 96–108)
CO2 SERPL-SCNC: 23 MMOL/L — SIGNIFICANT CHANGE UP (ref 22–31)
CREAT SERPL-MCNC: 0.79 MG/DL — SIGNIFICANT CHANGE UP (ref 0.5–1.3)
GLUCOSE BLDC GLUCOMTR-MCNC: 122 MG/DL — HIGH (ref 70–99)
GLUCOSE BLDC GLUCOMTR-MCNC: 134 MG/DL — HIGH (ref 70–99)
GLUCOSE BLDC GLUCOMTR-MCNC: 175 MG/DL — HIGH (ref 70–99)
GLUCOSE BLDC GLUCOMTR-MCNC: 185 MG/DL — HIGH (ref 70–99)
GLUCOSE SERPL-MCNC: 120 MG/DL — HIGH (ref 70–99)
HCT VFR BLD CALC: 31.2 % — LOW (ref 34.5–45)
HGB BLD-MCNC: 10.1 G/DL — LOW (ref 11.5–15.5)
MCHC RBC-ENTMCNC: 30.7 PG — SIGNIFICANT CHANGE UP (ref 27–34)
MCHC RBC-ENTMCNC: 32.4 GM/DL — SIGNIFICANT CHANGE UP (ref 32–36)
MCV RBC AUTO: 94.8 FL — SIGNIFICANT CHANGE UP (ref 80–100)
PLATELET # BLD AUTO: 325 K/UL — SIGNIFICANT CHANGE UP (ref 150–400)
POTASSIUM SERPL-MCNC: 4 MMOL/L — SIGNIFICANT CHANGE UP (ref 3.5–5.3)
POTASSIUM SERPL-SCNC: 4 MMOL/L — SIGNIFICANT CHANGE UP (ref 3.5–5.3)
RBC # BLD: 3.29 M/UL — LOW (ref 3.8–5.2)
RBC # FLD: 16.4 % — HIGH (ref 10.3–14.5)
SODIUM SERPL-SCNC: 144 MMOL/L — SIGNIFICANT CHANGE UP (ref 135–145)
WBC # BLD: 11.13 K/UL — HIGH (ref 3.8–10.5)
WBC # FLD AUTO: 11.13 K/UL — HIGH (ref 3.8–10.5)

## 2018-12-17 PROCEDURE — 99233 SBSQ HOSP IP/OBS HIGH 50: CPT

## 2018-12-17 RX ORDER — SODIUM CHLORIDE 9 MG/ML
50 INJECTION INTRAMUSCULAR; INTRAVENOUS; SUBCUTANEOUS ONCE
Qty: 0 | Refills: 0 | Status: COMPLETED | OUTPATIENT
Start: 2018-12-17 | End: 2018-12-17

## 2018-12-17 RX ADMIN — Medication 300 MILLIGRAM(S): at 11:29

## 2018-12-17 RX ADMIN — MIRTAZAPINE 7.5 MILLIGRAM(S): 45 TABLET, ORALLY DISINTEGRATING ORAL at 22:16

## 2018-12-17 RX ADMIN — TIOTROPIUM BROMIDE 1 CAPSULE(S): 18 CAPSULE ORAL; RESPIRATORY (INHALATION) at 15:00

## 2018-12-17 RX ADMIN — Medication 40 MILLIGRAM(S): at 06:15

## 2018-12-17 RX ADMIN — BUDESONIDE AND FORMOTEROL FUMARATE DIHYDRATE 2 PUFF(S): 160; 4.5 AEROSOL RESPIRATORY (INHALATION) at 19:52

## 2018-12-17 RX ADMIN — Medication 7.5 MILLIGRAM(S): at 09:17

## 2018-12-17 RX ADMIN — Medication 680 MILLIGRAM(S): at 18:09

## 2018-12-17 RX ADMIN — Medication 680 MILLIGRAM(S): at 19:52

## 2018-12-17 RX ADMIN — Medication 3 MILLILITER(S): at 11:29

## 2018-12-17 RX ADMIN — PANTOPRAZOLE SODIUM 40 MILLIGRAM(S): 20 TABLET, DELAYED RELEASE ORAL at 06:16

## 2018-12-17 RX ADMIN — Medication 3 MILLILITER(S): at 06:16

## 2018-12-17 RX ADMIN — GABAPENTIN 300 MILLIGRAM(S): 400 CAPSULE ORAL at 06:16

## 2018-12-17 RX ADMIN — FENTANYL CITRATE 1 PATCH: 50 INJECTION INTRAVENOUS at 19:18

## 2018-12-17 RX ADMIN — Medication 1 APPLICATION(S): at 06:17

## 2018-12-17 RX ADMIN — MINOCYCLINE HYDROCHLORIDE 100 MILLIGRAM(S): 45 TABLET, EXTENDED RELEASE ORAL at 19:05

## 2018-12-17 RX ADMIN — QUETIAPINE FUMARATE 25 MILLIGRAM(S): 200 TABLET, FILM COATED ORAL at 22:16

## 2018-12-17 RX ADMIN — Medication 1.5 MILLIGRAM(S): at 22:16

## 2018-12-17 RX ADMIN — SODIUM CHLORIDE 10 MILLILITER(S): 9 INJECTION INTRAMUSCULAR; INTRAVENOUS; SUBCUTANEOUS at 20:49

## 2018-12-17 RX ADMIN — APIXABAN 10 MILLIGRAM(S): 2.5 TABLET, FILM COATED ORAL at 06:15

## 2018-12-17 RX ADMIN — Medication 1 APPLICATION(S): at 16:18

## 2018-12-17 RX ADMIN — Medication 1 APPLICATION(S): at 19:05

## 2018-12-17 RX ADMIN — GABAPENTIN 300 MILLIGRAM(S): 400 CAPSULE ORAL at 19:05

## 2018-12-17 RX ADMIN — Medication 3 MILLILITER(S): at 18:09

## 2018-12-17 RX ADMIN — BUDESONIDE AND FORMOTEROL FUMARATE DIHYDRATE 2 PUFF(S): 160; 4.5 AEROSOL RESPIRATORY (INHALATION) at 06:16

## 2018-12-17 RX ADMIN — TIZANIDINE 4 MILLIGRAM(S): 4 TABLET ORAL at 21:18

## 2018-12-17 RX ADMIN — TIZANIDINE 4 MILLIGRAM(S): 4 TABLET ORAL at 09:17

## 2018-12-17 RX ADMIN — SERTRALINE 50 MILLIGRAM(S): 25 TABLET, FILM COATED ORAL at 11:29

## 2018-12-17 RX ADMIN — Medication 1 APPLICATION(S): at 06:16

## 2018-12-17 RX ADMIN — MINOCYCLINE HYDROCHLORIDE 100 MILLIGRAM(S): 45 TABLET, EXTENDED RELEASE ORAL at 06:16

## 2018-12-17 RX ADMIN — Medication 1: at 12:42

## 2018-12-17 RX ADMIN — APIXABAN 10 MILLIGRAM(S): 2.5 TABLET, FILM COATED ORAL at 18:09

## 2018-12-17 RX ADMIN — FENTANYL CITRATE 1 PATCH: 50 INJECTION INTRAVENOUS at 19:50

## 2018-12-17 RX ADMIN — Medication 1 APPLICATION(S): at 22:17

## 2018-12-17 RX ADMIN — Medication 1: at 00:05

## 2018-12-17 NOTE — PROGRESS NOTE ADULT - SUBJECTIVE AND OBJECTIVE BOX
CC: f/u for uti completed treatment     S: Patient reports nothing she is bed no fevers      Other Medications Reviewed  Vital Signs Last 24 Hrs  T(C): 37.8 (17 Dec 2018 11:23), Max: 38.1 (17 Dec 2018 10:49)  T(F): 100.1 (17 Dec 2018 11:23), Max: 100.5 (17 Dec 2018 10:49)  HR: 91 (17 Dec 2018 11:23) (90 - 95)  BP: 92/59 (17 Dec 2018 11:23) (92/59 - 102/62)  BP(mean): --  RR: 18 (17 Dec 2018 11:23) (18 - 20)  SpO2: 94% (17 Dec 2018 11:23) (93% - 96%)    PHYSICAL EXAM:  General: alert, no acute distress  Eyes:  anicteric, no conjunctival injection, no discharge  Oropharynx: no lesions or injection 	  Neck: supple, without adenopathy  Lungs: clear to auscultation  Heart: regular rate and rhythm; no murmur, rubs or gallops  Abdomen: soft, nondistended, nontender, without mass or organomegaly  Skin: no lesions  Extremities: no clubbing, cyanosis, or edema  Neurologic: alert, contracted    LAB RESULTS:                        10.1   11.13 )-----------( 325      ( 17 Dec 2018 07:48 )             31.2                           10.8   11.57 )-----------( 334      ( 15 Dec 2018 07:52 )             34.2     12-15    140  |  99  |  71<H>  ----------------------------<  139<H>  3.9   |  24  |  0.77    Ca    9.4      15 Dec 2018 06:13  Mg     2.0     12-14        Urinalysis Basic - ( 14 Dec 2018 08:59 )    Color: x / Appearance: x / SG: x / pH: x  Gluc: x / Ketone: x  / Bili: x / Urobili: x   Blood: x / Protein: x / Nitrite: x   Leuk Esterase: x / RBC: 12 /hpf / WBC 44 /HPF   Sq Epi: x / Non Sq Epi: 3 / Bacteria: x      MICROBIOLOGY:  RECENT CULTURES:  12-14 @ 13:19 .Urine Clean Catch (Midstream)     50,000 - 99,000 CFU/mL Yeast-like cells, presumptively not Candida  albicans      12-14 @ 07:28 .Blood Blood-Peripheral     No growth to date.      12-11 @ 09:37 .Blood Blood     No growth to date.          RADIOLOGY REVIEWED:    < from: VA Duplex Lower Ext Vein Scan, Bilat (12.15.18 @ 14:01) >  IMPRESSION:     Acute, above the knee deep venous thrombosis extending from the right   common femoral vein through the popliteal vein and into the right   gastrocnemius vein.    < from: NM Pulmonary Ventilation/Perfusion Scan (12.15.18 @ 10:13) >    IMPRESSION: Very low probability of pulmonary embolus.

## 2018-12-17 NOTE — PROGRESS NOTE ADULT - ASSESSMENT
Assessment:  Advanced dementia, right thr mrsa infection s/p leisa and spacer on suppressive minocycline, recurrent aspiration events, chronic gavin finishing tx for cre pseudomonas cauti, fever is suspect to be  from new dx dvt rle  The patient has completed a full course of Zerbaxa as per notes in the chart   repeat ucx was reported to growing  yeast which is a colonizing vince   reviewed her wbc are stable     Plan:   she has completed a full course of antibiotics   continue supportive care as per primary medical team

## 2018-12-17 NOTE — PROGRESS NOTE ADULT - ASSESSMENT
dvt   chf exacerbation   sacral and right hip decubitus continue wound care and adjust patient and move accordingly

## 2018-12-17 NOTE — PROGRESS NOTE ADULT - ASSESSMENT
68 year-old woman with Alzheimer's dementia seen to have volume overload and biventricular congestive heart failure.  TTE on this admission shows reduced LVEF, elevated pulmonary pressures.    Given severely reduced LVEF, plan to continue ACEi and uptitrate as blood pressure permits.  Will defer starting beta-blocker in this patient as she has baseline restrictive airway disease and is beta-agonist inhalers. Can reconsider low dose carvedilol as outpatient.    Patient with recurrent fevers - follow-up ID recommendations for antibiotics (currently being treated for MDR pseudomonas).  RLE DVT could be etiology of fevers.    In light of large DVT and previously documented pulmonary artery hypertension with RV dysfunction, I discussed possibility of IVC filter placement with primary attending, however, if patient is tolerating anticoagulation, there is no need for IVC filter at this time. IVC filter, itself, could become future nidus for thromboemboli.  Continue anticoagulation with apixaban 10mg BID for seven days followed by 5mg BID thereafter.    Discharge planning per primary team.

## 2018-12-17 NOTE — PROGRESS NOTE ADULT - SUBJECTIVE AND OBJECTIVE BOX
---___---___---___---___---___---___ ---___---___---___---___---___---___---___---___---___---                    <<<  M E D I C A L   A T T E N D I N G    F O L L O W    U P   N O T E  >>>    - Patient seen and examined by me approximately thirty minutes ago.  - In summary, patient is a 68y year old Female who origianlly presented with chf exacerbation and new onset dvt   - Patient today overall doing ok, comfortable, eating OK.     ---___---___---___---___---___---      <<<  MEDICATIONS:  >>>    MEDICATIONS  (STANDING):  ALBUTerol/ipratropium for Nebulization 3 milliLiter(s) Nebulizer every 6 hours  apixaban 10 milliGRAM(s) Oral every 12 hours  AQUAPHOR (petrolatum Ointment) 1 Application(s) Topical three times a day  buDESOnide 160 MICROgram(s)/formoterol 4.5 MICROgram(s) Inhaler 2 Puff(s) Inhalation two times a day  clobetasol 0.05% Ointment 1 Application(s) Topical two times a day  clonazePAM Tablet 1.5 milliGRAM(s) Oral at bedtime  dextrose 5%. 1000 milliLiter(s) (50 mL/Hr) IV Continuous <Continuous>  dextrose 50% Injectable 12.5 Gram(s) IV Push once  dextrose 50% Injectable 25 Gram(s) IV Push once  dextrose 50% Injectable 25 Gram(s) IV Push once  fentaNYL   Patch  12 MICROgram(s)/Hr 1 Patch Transdermal every 72 hours  ferrous    sulfate Liquid 300 milliGRAM(s) Enteral Tube daily  furosemide    Tablet 40 milliGRAM(s) Oral daily  gabapentin   Solution 300 milliGRAM(s) Oral two times a day  insulin lispro (HumaLOG) corrective regimen sliding scale   SubCutaneous every 6 hours  lisinopril 5 milliGRAM(s) Oral daily  medical marijuana oil 1 milliLiter(s),medical marijuana oil 1 milliLiter(s) 1 milliLiter(s) SubLingual <User Schedule>  minocycline 100 milliGRAM(s) Oral two times a day  mirtazapine 7.5 milliGRAM(s) Oral at bedtime  pantoprazole   Suspension 40 milliGRAM(s) Oral before breakfast  predniSONE   Tablet 7.5 milliGRAM(s) Oral <User Schedule>  QUEtiapine 25 milliGRAM(s) Oral at bedtime  sertraline 50 milliGRAM(s) Oral daily  sodium chloride 0.9% Bolus 50 milliLiter(s) IV Bolus once  tiotropium 18 MICROgram(s) Capsule 1 Capsule(s) Inhalation daily  tiZANidine 4 milliGRAM(s) Oral <User Schedule>      MEDICATIONS  (PRN):  acetaminophen    Suspension .. 680 milliGRAM(s) Oral every 6 hours PRN Temp greater or equal to 38C (100.4F), Mild Pain (1 - 3)  dextrose 40% Gel 15 Gram(s) Oral once PRN Blood Glucose LESS THAN 70 milliGRAM(s)/deciliter  glucagon  Injectable 1 milliGRAM(s) IntraMuscular once PRN Glucose LESS THAN 70 milligrams/deciliter  nystatin Powder 1 Application(s) Topical two times a day PRN rash/itchiness       ---___---___---___---___---___---     <<<REVIEW OF SYSTEM: >>>    GEN: no fever, no chills, no pain  RESP: no SOB, no cough, no sputum  CVS: no chest pain, no palpitations, no edema  GI: no abdominal pain, no nausea, no vomiting, no constipation, no diarrhea  : no dysurea, no frequency  NEURO: no headache, no dizziness  PSYCH: no depression, not anxious  Derm : no itching, no rash     ---___---___---___---___---___---          <<<  VITAL SIGNS: >>>    T(F): 100.1 (12-17-18 @ 11:23), Max: 100.5 (12-17-18 @ 10:49)  HR: 91 (12-17-18 @ 11:23) (90 - 95)  BP: 92/59 (12-17-18 @ 11:23) (92/59 - 102/62)  RR: 18 (12-17-18 @ 11:23) (18 - 20)  SpO2: 94% (12-17-18 @ 11:23) (93% - 96%)  Wt(kg): --  CAPILLARY BLOOD GLUCOSE      POCT Blood Glucose.: 185 mg/dL (17 Dec 2018 11:59)    I&O's Summary    16 Dec 2018 07:01  -  17 Dec 2018 07:00  --------------------------------------------------------  IN: 0 mL / OUT: 500 mL / NET: -500 mL    17 Dec 2018 07:01  -  17 Dec 2018 19:15  --------------------------------------------------------  IN: 0 mL / OUT: 700 mL / NET: -700 mL         ---___---___---___---___---___---                       PHYSICAL EXAM:    GEN: A&O X 3 , NAD , comfortable  HEENT: NCAT, PERRL, MMM, no scleral icterus, hearing intact  NECK: Supple, No JVD  CVS: S1S2 , regular , No M/R/G appreciated  PULM: CTA B/L,  no W/R/R appreciated  ABD.: soft. non tender, non distended,  bowel sounds present peg noted   Extrem: intact pulses , no edema noted  Derm: No rash or ecchymosis noted sacral decubitus noted   PSYCH: normal mood, no depression, not anxious     ---___---___---___---___---___---     <<<  LAB AND IMAGING: >>>                          10.1   11.13 )-----------( 325      ( 17 Dec 2018 07:48 )             31.2               12-17    144  |  102  |  88<H>  ----------------------------<  120<H>  4.0   |  23  |  0.79    Ca    9.3      17 Dec 2018 06:42                                 [All pertinent / recent available Imaging reports and other labs reviewed]     ---___---___---___---___---___---___ ---___---___---___---___---           <<<  A S S E S S M E N T   A N D   P L A N :  >>>          -GI/DVT Prophylaxis.    --------------------------------------------  Case discussed with   Education given on     >>______________________<<      Deniz Bolden .         phone   [phone]

## 2018-12-17 NOTE — PROGRESS NOTE ADULT - SUBJECTIVE AND OBJECTIVE BOX
HPI:  Patient tolerating anticoagulation well without evidence of bleeding.    Review Of Systems:   Unable to assess in setting of dementia    Medications:  acetaminophen    Suspension .. 680 milliGRAM(s) Oral every 6 hours PRN  ALBUTerol/ipratropium for Nebulization 3 milliLiter(s) Nebulizer every 6 hours  apixaban 10 milliGRAM(s) Oral every 12 hours  AQUAPHOR (petrolatum Ointment) 1 Application(s) Topical three times a day  buDESOnide 160 MICROgram(s)/formoterol 4.5 MICROgram(s) Inhaler 2 Puff(s) Inhalation two times a day  clobetasol 0.05% Ointment 1 Application(s) Topical two times a day  clonazePAM Tablet 1.5 milliGRAM(s) Oral at bedtime  dextrose 40% Gel 15 Gram(s) Oral once PRN  dextrose 5%. 1000 milliLiter(s) IV Continuous <Continuous>  dextrose 50% Injectable 12.5 Gram(s) IV Push once  dextrose 50% Injectable 25 Gram(s) IV Push once  dextrose 50% Injectable 25 Gram(s) IV Push once  fentaNYL   Patch  12 MICROgram(s)/Hr 1 Patch Transdermal every 72 hours  ferrous    sulfate Liquid 300 milliGRAM(s) Enteral Tube daily  furosemide    Tablet 40 milliGRAM(s) Oral daily  gabapentin   Solution 300 milliGRAM(s) Oral two times a day  glucagon  Injectable 1 milliGRAM(s) IntraMuscular once PRN  insulin lispro (HumaLOG) corrective regimen sliding scale   SubCutaneous every 6 hours  lisinopril 5 milliGRAM(s) Oral daily  medical marijuana oil 1 milliLiter(s),medical marijuana oil 1 milliLiter(s) 1 milliLiter(s) SubLingual <User Schedule>  minocycline 100 milliGRAM(s) Oral two times a day  mirtazapine 7.5 milliGRAM(s) Oral at bedtime  nystatin Powder 1 Application(s) Topical two times a day PRN  pantoprazole   Suspension 40 milliGRAM(s) Oral before breakfast  predniSONE   Tablet 7.5 milliGRAM(s) Oral <User Schedule>  QUEtiapine 25 milliGRAM(s) Oral at bedtime  sertraline 50 milliGRAM(s) Oral daily  sodium chloride 0.9% Bolus 50 milliLiter(s) IV Bolus once  tiotropium 18 MICROgram(s) Capsule 1 Capsule(s) Inhalation daily  tiZANidine 4 milliGRAM(s) Oral <User Schedule>    PAST MEDICAL & SURGICAL HISTORY:  CVA (cerebral vascular accident)  Fatty pancreas  PNA (pneumonia)  Pulmonary HTN  IGT (impaired glucose tolerance)  Ulcerative colitis  Acid reflux  Anxiety  Depression  Mouth sores  HLD (hyperlipidemia)  Asthma  Humeral head fracture  H/O: hysterectomy  H/O cataract extraction, left  History of knee replacement    Vitals:  T(C): 38.2 (12-17-18 @ 19:52), Max: 38.2 (12-17-18 @ 19:52)  HR: 91 (12-17-18 @ 11:23) (90 - 95)  BP: 92/59 (12-17-18 @ 11:23) (92/59 - 102/62)  BP(mean): --  RR: 18 (12-17-18 @ 11:23) (18 - 20)  SpO2: 94% (12-17-18 @ 11:23) (93% - 96%)  Wt(kg): --  Daily     Daily   I&O's Summary    16 Dec 2018 07:01  -  17 Dec 2018 07:00  --------------------------------------------------------  IN: 0 mL / OUT: 500 mL / NET: -500 mL    17 Dec 2018 07:01  -  17 Dec 2018 20:41  --------------------------------------------------------  IN: 0 mL / OUT: 1000 mL / NET: -1000 mL        Physical Exam:  Appearance: appears older than stated age; bedbound, demented; no acute distress  Eyes: PERRL, EOMI, pink conjunctiva  HENT: Normal oral mucosa  Cardiovascular: RRR, S1, S2; no LE edema bilaterally; normal JVP  Respiratory: poor inspiratory effort  Gastrointestinal: soft, non-tender, non-distended with normal bowel sounds; +PEG  Musculoskeletal: Bedbound, contracted  Neurologic: cranial nerves grossly intact  Lymphatic: No lymphadenopathy  Psychiatry: awake and alert  Skin: No rashes, ecchymoses, or cyanosis                            10.1   11.13 )-----------( 325      ( 17 Dec 2018 07:48 )             31.2     12-17    144  |  102  |  88<H>  ----------------------------<  120<H>  4.0   |  23  |  0.79    Ca    9.3      17 Dec 2018 06:42              Echo: < from: Transthoracic Echocardiogram (12.03.18 @ 11:23) >  ------------------------------------------------------------------------  Conclusions:  1. Mitral annular calcification, otherwise normal mitral  valve. Moderate-severe mitral regurgitation (vena contracta  0.8 cm)  2. Calcified trileafletaortic valve with normal opening.  Peak transaortic valve gradient equals 3 mm Hg, mean  transaortic valve gradient equals 1 mm Hg, aortic valve  velocity time integral equals 13 cm. Moderate aortic  regurgitation  3. Moderately dilated left atrium.LA volume index = 45  cc/m2.  4. Severe global left ventricular systolic dysfunction.  5. Increased E/e'  is consistent with elevated left  ventricular filling pressure.  6. Moderate right atrial enlargement. Inferior vena cava is  dilated (>=2.5cm) with normal respiratory variability  consistent with right atrial pressure 16-20mm Hg.  7. Right ventricular enlargement with decreased right  ventricular systolic function.  8. Normal tricuspid valve. Severe tricuspid regurgitation.  9. Estimated pulmonary artery systolic pressure equals 82  mm Hg, assuming right atrial pressure equals 8 mm Hg,  consistent with severe pulmonary pressures.  10. Color Doppler demonstrates evidence of left to right  shunt  ------------------------------------------------------------------------  Confirmed on  12/3/2018 - 16:37:07 by Margaret Khan M.D.  ------------------------------------------------------------------------    < end of copied text >    Imaging: < from: Xray Chest 1 View- PORTABLE-Urgent (12.02.18 @ 17:19) >  IMPRESSION:   Limited study due to patient positioning.  Trace right pleural effusion and likely left pleural effusion. Question   mild pulmonary edema.    HUAN JACOBS M.D., RADIOLOGY RESIDENT  This document has been electronically signed.  TONYA AARON M.D., ATTENDING RADIOLOGIST  This document has been electronically signed. Dec  2 2018  9:17PM      < end of copied text >    < from: [facility] Duplex Lower Ext Vein Scan, Bilat (12.15.18 @ 14:01) >  IMPRESSION:     Acute, above the knee deep venous thrombosis extending from the right   common femoral vein through the popliteal vein and into the right   gastrocnemius vein.    No left lower extremity DVT.    The above findings were discussed with BENJI Ryan by Dr. Tapia on   12/15/2018 2:43 PM, with read-back verification.    YELENA TAPIA M.D., RADIOLOGY RESIDENT  This document has been electronically signed.  PRIMO MURILLO M.D., ATTENDING RADIOLOGIST  This document has been electronically signed. Dec 15 2018  3:55PM        < end of copied text >    < from: NM Pulmonary Ventilation/Perfusion Scan (12.15.18 @ 10:13) >  FINDINGS: There is heterogeneous distribution of radiopharmaceutical in   both lungs on the ventilation and perfusion images. There are no   segmental perfusion defects in either lung. No significant interval   change since the previous study of 12/3/2018.    IMPRESSION: Very low probability of pulmonary embolus.    KRYSTYNA JULIAN M.D., CHIEF OF NUCLEAR MEDICINE  This document has been electronically signed. Dec 15 2018 10:15AM    < end of copied text >    Interpretation of Telemetry: sinus rhythm

## 2018-12-18 LAB
ANION GAP SERPL CALC-SCNC: 16 MMOL/L — SIGNIFICANT CHANGE UP (ref 5–17)
BUN SERPL-MCNC: 80 MG/DL — HIGH (ref 7–23)
CALCIUM SERPL-MCNC: 9.9 MG/DL — SIGNIFICANT CHANGE UP (ref 8.4–10.5)
CHLORIDE SERPL-SCNC: 103 MMOL/L — SIGNIFICANT CHANGE UP (ref 96–108)
CO2 SERPL-SCNC: 24 MMOL/L — SIGNIFICANT CHANGE UP (ref 22–31)
CREAT SERPL-MCNC: 0.75 MG/DL — SIGNIFICANT CHANGE UP (ref 0.5–1.3)
GLUCOSE BLDC GLUCOMTR-MCNC: 125 MG/DL — HIGH (ref 70–99)
GLUCOSE BLDC GLUCOMTR-MCNC: 147 MG/DL — HIGH (ref 70–99)
GLUCOSE BLDC GLUCOMTR-MCNC: 148 MG/DL — HIGH (ref 70–99)
GLUCOSE BLDC GLUCOMTR-MCNC: 168 MG/DL — HIGH (ref 70–99)
GLUCOSE SERPL-MCNC: 113 MG/DL — HIGH (ref 70–99)
HCT VFR BLD CALC: 30.7 % — LOW (ref 34.5–45)
HGB BLD-MCNC: 9.9 G/DL — LOW (ref 11.5–15.5)
MAGNESIUM SERPL-MCNC: 2.2 MG/DL — SIGNIFICANT CHANGE UP (ref 1.6–2.6)
MCHC RBC-ENTMCNC: 29.7 PG — SIGNIFICANT CHANGE UP (ref 27–34)
MCHC RBC-ENTMCNC: 32.2 GM/DL — SIGNIFICANT CHANGE UP (ref 32–36)
MCV RBC AUTO: 92.2 FL — SIGNIFICANT CHANGE UP (ref 80–100)
PHOSPHATE SERPL-MCNC: 3.7 MG/DL — SIGNIFICANT CHANGE UP (ref 2.5–4.5)
PLATELET # BLD AUTO: 334 K/UL — SIGNIFICANT CHANGE UP (ref 150–400)
POTASSIUM SERPL-MCNC: 4 MMOL/L — SIGNIFICANT CHANGE UP (ref 3.5–5.3)
POTASSIUM SERPL-SCNC: 4 MMOL/L — SIGNIFICANT CHANGE UP (ref 3.5–5.3)
RBC # BLD: 3.33 M/UL — LOW (ref 3.8–5.2)
RBC # FLD: 16.2 % — HIGH (ref 10.3–14.5)
SODIUM SERPL-SCNC: 143 MMOL/L — SIGNIFICANT CHANGE UP (ref 135–145)
WBC # BLD: 11.74 K/UL — HIGH (ref 3.8–10.5)
WBC # FLD AUTO: 11.74 K/UL — HIGH (ref 3.8–10.5)

## 2018-12-18 PROCEDURE — 73552 X-RAY EXAM OF FEMUR 2/>: CPT | Mod: 26,RT

## 2018-12-18 PROCEDURE — 99233 SBSQ HOSP IP/OBS HIGH 50: CPT

## 2018-12-18 PROCEDURE — 73502 X-RAY EXAM HIP UNI 2-3 VIEWS: CPT | Mod: 26,RT

## 2018-12-18 PROCEDURE — 73562 X-RAY EXAM OF KNEE 3: CPT | Mod: 26,RT

## 2018-12-18 RX ORDER — CLONAZEPAM 1 MG
0.5 TABLET ORAL AT BEDTIME
Qty: 0 | Refills: 0 | Status: DISCONTINUED | OUTPATIENT
Start: 2018-12-18 | End: 2018-12-25

## 2018-12-18 RX ADMIN — Medication 1 APPLICATION(S): at 14:49

## 2018-12-18 RX ADMIN — MIRTAZAPINE 7.5 MILLIGRAM(S): 45 TABLET, ORALLY DISINTEGRATING ORAL at 21:45

## 2018-12-18 RX ADMIN — MINOCYCLINE HYDROCHLORIDE 100 MILLIGRAM(S): 45 TABLET, EXTENDED RELEASE ORAL at 06:32

## 2018-12-18 RX ADMIN — Medication 1 APPLICATION(S): at 21:45

## 2018-12-18 RX ADMIN — Medication 680 MILLIGRAM(S): at 15:51

## 2018-12-18 RX ADMIN — Medication 3 MILLILITER(S): at 12:27

## 2018-12-18 RX ADMIN — BUDESONIDE AND FORMOTEROL FUMARATE DIHYDRATE 2 PUFF(S): 160; 4.5 AEROSOL RESPIRATORY (INHALATION) at 06:33

## 2018-12-18 RX ADMIN — APIXABAN 10 MILLIGRAM(S): 2.5 TABLET, FILM COATED ORAL at 06:32

## 2018-12-18 RX ADMIN — Medication 1: at 18:32

## 2018-12-18 RX ADMIN — MINOCYCLINE HYDROCHLORIDE 100 MILLIGRAM(S): 45 TABLET, EXTENDED RELEASE ORAL at 17:46

## 2018-12-18 RX ADMIN — TIZANIDINE 4 MILLIGRAM(S): 4 TABLET ORAL at 09:12

## 2018-12-18 RX ADMIN — Medication 0.5 MILLIGRAM(S): at 21:45

## 2018-12-18 RX ADMIN — Medication 3 MILLILITER(S): at 06:33

## 2018-12-18 RX ADMIN — APIXABAN 10 MILLIGRAM(S): 2.5 TABLET, FILM COATED ORAL at 17:46

## 2018-12-18 RX ADMIN — SERTRALINE 50 MILLIGRAM(S): 25 TABLET, FILM COATED ORAL at 12:27

## 2018-12-18 RX ADMIN — Medication 300 MILLIGRAM(S): at 12:27

## 2018-12-18 RX ADMIN — Medication 7.5 MILLIGRAM(S): at 10:27

## 2018-12-18 RX ADMIN — FENTANYL CITRATE 1 PATCH: 50 INJECTION INTRAVENOUS at 17:45

## 2018-12-18 RX ADMIN — FENTANYL CITRATE 1 PATCH: 50 INJECTION INTRAVENOUS at 06:56

## 2018-12-18 RX ADMIN — TIZANIDINE 4 MILLIGRAM(S): 4 TABLET ORAL at 21:45

## 2018-12-18 RX ADMIN — FENTANYL CITRATE 1 PATCH: 50 INJECTION INTRAVENOUS at 19:38

## 2018-12-18 RX ADMIN — TIOTROPIUM BROMIDE 1 CAPSULE(S): 18 CAPSULE ORAL; RESPIRATORY (INHALATION) at 12:27

## 2018-12-18 RX ADMIN — Medication 1 APPLICATION(S): at 06:37

## 2018-12-18 RX ADMIN — Medication 40 MILLIGRAM(S): at 06:32

## 2018-12-18 RX ADMIN — GABAPENTIN 300 MILLIGRAM(S): 400 CAPSULE ORAL at 17:45

## 2018-12-18 RX ADMIN — QUETIAPINE FUMARATE 25 MILLIGRAM(S): 200 TABLET, FILM COATED ORAL at 21:45

## 2018-12-18 RX ADMIN — PANTOPRAZOLE SODIUM 40 MILLIGRAM(S): 20 TABLET, DELAYED RELEASE ORAL at 06:33

## 2018-12-18 RX ADMIN — Medication 680 MILLIGRAM(S): at 16:51

## 2018-12-18 RX ADMIN — Medication 3 MILLILITER(S): at 17:53

## 2018-12-18 RX ADMIN — BUDESONIDE AND FORMOTEROL FUMARATE DIHYDRATE 2 PUFF(S): 160; 4.5 AEROSOL RESPIRATORY (INHALATION) at 17:53

## 2018-12-18 RX ADMIN — GABAPENTIN 300 MILLIGRAM(S): 400 CAPSULE ORAL at 07:23

## 2018-12-18 RX ADMIN — Medication 1 APPLICATION(S): at 17:36

## 2018-12-18 RX ADMIN — LISINOPRIL 5 MILLIGRAM(S): 2.5 TABLET ORAL at 06:32

## 2018-12-18 NOTE — CONSULT NOTE ADULT - SUBJECTIVE AND OBJECTIVE BOX
68yFemale readmitted on 12/2 for CHF exacerbation, well known to our service for R hip replacement summer 2018 followed by MRSA infection reqruiring GABRIEL and spacer, now on suppressive minocycline. Patient's daughter noticed blood at distal femur incision today and believes patient is unable to scratch herself that low given advanced dementia and minimal mobility. Patient noted to have low grade fevers secondary to DVT. Patient has remained bedbound and has not ambulated since surgery. ID at this time does not believe there is further indication for antibiotic use.     PMH:  CVA (cerebral vascular accident)  Fatty pancreas  Fatty liver  PNA (pneumonia)  Pulmonary HTN  IGT (impaired glucose tolerance)  Ulcerative colitis  Acid reflux  Anxiety  Depression  Mouth sores  HLD (hyperlipidemia)  Asthma    PSH:  Humeral head fracture  H/O: hysterectomy  H/O cataract extraction, left  History of knee replacement    AH:  aspirin (Short breath)  divalproex sodium (Other (Unknown))  Haldol (Other (Unknown))  penicillin (Short breath; Rash)  vancomycin (Rash; Urticaria; Hives)  Xanax (Other (Unknown))    Meds: See med rec    T(C): 37.5 (12-18-18 @ 12:20)  HR: 99 (12-18-18 @ 12:20)  BP: 127/58 (12-18-18 @ 12:20)  RR: 18 (12-18-18 @ 12:20)  SpO2: 96% (12-18-18 @ 12:20)  Wt(kg): --                        9.9    11.74 )-----------( 334      ( 18 Dec 2018 08:10 )             30.7     12-18    143  |  103  |  80<H>  ----------------------------<  113<H>  4.0   |  24  |  0.75    Ca    9.9      18 Dec 2018 06:52  Phos  3.7     12-18  Mg     2.2     12-18      PE  Gen: Laying in bed  Resp: Unlabored breathing  RLE: Incisions at knee healed, there is blood at distal aspect of lateral incision by knee, no purulence or bleeding expressed  grossly moving toes but not following commands  flexion contracture right knee to 30 degrees, can flex to 90 degrees  soft compartments, no calf ttp     Secondary:  No TTP over bony landmarks, SILT BL, ROM intact BL, distal pulses palpable.    Imaging:  Pending new films    A/P: 68F with antibiotic spacer for R Periprosthetic Hip Infection and distal femur ORIF  - Pending right hip, femur and knee xr's  - Distal femur incision healed, no suspicion for distal femur sinus tract/infection  - Antiobiotic plan per ID  - No acute orthopedic intervention  - Follow-up with Dr. Rey in office 1-2 weeks from discharge [phone]

## 2018-12-18 NOTE — PROGRESS NOTE ADULT - ASSESSMENT
Assessment:  Advanced dementia, right thr mrsa infection s/p leisa and spacer on suppressive minocycline, recurrent aspiration events, chronic gavin completed  tx for cre pseudomonas  The patient has completed a full course of Zerbaxa as per notes in the chart   repeat ucx was reported to growing  yeast which is a colonizing vince   reviewed her wbc are stable   reviewed her flow sheets she was noted to have a low grade fever is likely secondary DVT inflammatory     Plan:   no indication for further antibiotic use   continue supportive care as per primary medical team

## 2018-12-18 NOTE — PROGRESS NOTE ADULT - SUBJECTIVE AND OBJECTIVE BOX
CC: f/u for uti completed treatment     S: Patient is in bed awake, she reports nothing     Other Medications Reviewed  Vital Signs Last 24 Hrs  T(C): 37.3 (18 Dec 2018 10:31), Max: 38.2 (17 Dec 2018 19:52)  T(F): 99.1 (18 Dec 2018 10:31), Max: 100.7 (17 Dec 2018 19:52)  HR: 93 (18 Dec 2018 06:55) (91 - 93)  BP: 157/77 (18 Dec 2018 06:55) (92/59 - 157/77)  BP(mean): --  RR: 18 (18 Dec 2018 06:55) (18 - 18)  SpO2: 94% (18 Dec 2018 06:55) (94% - 95%)    PHYSICAL EXAM:  General: alert, no acute distress  Eyes:  anicteric, no conjunctival injection, no discharge  Oropharynx: no lesions or injection 	  Neck: supple, without adenopathy  Lungs: clear to auscultation  Heart: regular rate and rhythm; no murmur, rubs or gallops  Abdomen: soft, nondistended, nontender, without mass or organomegaly  Skin: no lesions  Extremities: no clubbing, cyanosis, or edema  Neurologic: alert, contracted    LAB RESULTS:                        9.9    11.74 )-----------( 334      ( 18 Dec 2018 08:10 )             30.7                           10.1   11.13 )-----------( 325      ( 17 Dec 2018 07:48 )             31.2                           10.8   11.57 )-----------( 334      ( 15 Dec 2018 07:52 )             34.2     12-15    140  |  99  |  71<H>  ----------------------------<  139<H>  3.9   |  24  |  0.77    Ca    9.4      15 Dec 2018 06:13  Mg     2.0     12-14        Urinalysis Basic - ( 14 Dec 2018 08:59 )    Color: x / Appearance: x / SG: x / pH: x  Gluc: x / Ketone: x  / Bili: x / Urobili: x   Blood: x / Protein: x / Nitrite: x   Leuk Esterase: x / RBC: 12 /hpf / WBC 44 /HPF   Sq Epi: x / Non Sq Epi: 3 / Bacteria: x      MICROBIOLOGY:  RECENT CULTURES:  12-14 @ 13:19 .Urine Clean Catch (Midstream)     50,000 - 99,000 CFU/mL Yeast-like cells, presumptively not Candida  albicans      12-14 @ 07:28 .Blood Blood-Peripheral     No growth to date.      12-11 @ 09:37 .Blood Blood     No growth to date.          RADIOLOGY REVIEWED:    < from: [facility] Duplex Lower Ext Vein Scan, Bilat (12.15.18 @ 14:01) >  IMPRESSION:     Acute, above the knee deep venous thrombosis extending from the right   common femoral vein through the popliteal vein and into the right   gastrocnemius vein.    < from: NM Pulmonary Ventilation/Perfusion Scan (12.15.18 @ 10:13) >    IMPRESSION: Very low probability of pulmonary embolus.

## 2018-12-18 NOTE — CHART NOTE - NSCHARTNOTEFT_GEN_A_CORE
Nutrition Follow Up Note  Patient seen for: nutrition follow-up   Source: , RN, pt discussed at rounds     Chart reviewed, events noted. 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 Per ID, full course of antibiotics completed. Pt now with low grade fever, discharge pending pt being free of fever x 24 hrs per discussions at rounds.     Diet : NPO with enteral feeds      Enteral /Parenteral Nutrition: Pt receiving bolus feeds of Vital AF 1.2 @ 200ml q 4hs (5 feeds per day total). Current regimen provides 1000ml total volume, 1200kcal (26Kcal/Kg) and 75g (1.65g/Kg) protein and 811ml free water based on current dosing wt of 45.4lbs. Per , bowel movements much improved with addition of Banatrol 2x per day. Reports pt having 2 BMs per day which is normal for her. Per RN, pt tolerating feeds well. Last BM 12/18 x1.    No new weights to assess, pt noted with large wt fluctuation this admission, question accuracy of weight trends.   11/13: 105 lbs   11/14: 95.2 lbs  Dosing wt 99.88 lbs (12/4)  12/3: 123.8lbs   12/4: 122.5lbs   12/5: 122.1lbs   12/12: 91.4lbs     Pertinent Medications: MEDICATIONS  (STANDING):  ALBUTerol/ipratropium for Nebulization 3 milliLiter(s) Nebulizer every 6 hours  apixaban 10 milliGRAM(s) Oral every 12 hours  AQUAPHOR (petrolatum Ointment) 1 Application(s) Topical three times a day  buDESOnide 160 MICROgram(s)/formoterol 4.5 MICROgram(s) Inhaler 2 Puff(s) Inhalation two times a day  clobetasol 0.05% Ointment 1 Application(s) Topical two times a day  clonazePAM Tablet 0.5 milliGRAM(s) Oral at bedtime  dextrose 5%. 1000 milliLiter(s) (50 mL/Hr) IV Continuous <Continuous>  dextrose 50% Injectable 12.5 Gram(s) IV Push once  dextrose 50% Injectable 25 Gram(s) IV Push once  dextrose 50% Injectable 25 Gram(s) IV Push once  fentaNYL   Patch  12 MICROgram(s)/Hr 1 Patch Transdermal every 72 hours  ferrous    sulfate Liquid 300 milliGRAM(s) Enteral Tube daily  furosemide    Tablet 40 milliGRAM(s) Oral daily  gabapentin   Solution 300 milliGRAM(s) Oral two times a day  insulin lispro (HumaLOG) corrective regimen sliding scale   SubCutaneous every 6 hours  lisinopril 5 milliGRAM(s) Oral daily  medical marijuana oil 1 milliLiter(s),medical marijuana oil 1 milliLiter(s) 1 milliLiter(s) SubLingual <User Schedule>  minocycline 100 milliGRAM(s) Oral two times a day  mirtazapine 7.5 milliGRAM(s) Oral at bedtime  pantoprazole   Suspension 40 milliGRAM(s) Oral before breakfast  predniSONE   Tablet 7.5 milliGRAM(s) Oral <User Schedule>  QUEtiapine 25 milliGRAM(s) Oral at bedtime  sertraline 50 milliGRAM(s) Oral daily  tiotropium 18 MICROgram(s) Capsule 1 Capsule(s) Inhalation daily  tiZANidine 4 milliGRAM(s) Oral <User Schedule>    MEDICATIONS  (PRN):  acetaminophen    Suspension .. 680 milliGRAM(s) Oral every 6 hours PRN Temp greater or equal to 38C (100.4F), Mild Pain (1 - 3)  dextrose 40% Gel 15 Gram(s) Oral once PRN Blood Glucose LESS THAN 70 milliGRAM(s)/deciliter  glucagon  Injectable 1 milliGRAM(s) IntraMuscular once PRN Glucose LESS THAN 70 milligrams/deciliter  nystatin Powder 1 Application(s) Topical two times a day PRN rash/itchiness    Pertinent Labs: 12-18 @ 06:52: Na 143, BUN 80<H>, Cr 0.75, <H>, K+ 4.0, Phos 3.7, Mg 2.2, Alk Phos --, ALT/SGPT --, AST/SGOT --, HbA1c --    Finger Sticks:  POCT Blood Glucose.: 147 mg/dL (12-18 @ 12:18)  POCT Blood Glucose.: 125 mg/dL (12-18 @ 06:39)  POCT Blood Glucose.: 148 mg/dL (12-18 @ 00:07)  POCT Blood Glucose.: 134 mg/dL (12-17 @ 20:13)      Skin per nursing documentation: unstageable sacrum, stage 2 sacrum.   Edema: +1 BLE    Estimated Needs:   [x ] no change since previous assessment  [ ] recalculated:     Previous Nutrition Diagnosis: increased nutrient needs  Nutrition Diagnosis is: on going     New Nutrition Diagnosis: N/A       Interventions:   Recommend  1)Recommend continue bolus feeds of Vital 1.2 AF @ 200ml q 4hs (5 feeds per day from 8am-10pm). Current regimen provides 1000ml total volume, 1200kcal (26Kcal/Kg) and 75g (1.65g/Kg) protein and 811ml free water based on current dosing wt of 45.4lbs. Defer additional free water to team. Current regimen providing 811ml free water.  2) Continue Banatrol Plus 2 packets per day as needed in setting of loose/frequent stooling given report of frequent loose stools.   3) Monitor tolerance to EN and adjust as needed.       Monitoring and Evaluation:     Continue to monitor Nutritional intake, Tolerance to diet prescription, weights, labs, skin integrity    RD remains available upon request and will follow up per protocol  Shasha Hare RD, CDN, Pager # [phone]

## 2018-12-18 NOTE — PROGRESS NOTE ADULT - SUBJECTIVE AND OBJECTIVE BOX
HPI:  Low grade fever overnight.    Review Of Systems:      Unable to assess in setting of dementia    Medications:  acetaminophen    Suspension .. 680 milliGRAM(s) Oral every 6 hours PRN  ALBUTerol/ipratropium for Nebulization 3 milliLiter(s) Nebulizer every 6 hours  apixaban 10 milliGRAM(s) Oral every 12 hours  AQUAPHOR (petrolatum Ointment) 1 Application(s) Topical three times a day  buDESOnide 160 MICROgram(s)/formoterol 4.5 MICROgram(s) Inhaler 2 Puff(s) Inhalation two times a day  clobetasol 0.05% Ointment 1 Application(s) Topical two times a day  clonazePAM Tablet 0.5 milliGRAM(s) Oral at bedtime  dextrose 40% Gel 15 Gram(s) Oral once PRN  dextrose 5%. 1000 milliLiter(s) IV Continuous <Continuous>  dextrose 50% Injectable 12.5 Gram(s) IV Push once  dextrose 50% Injectable 25 Gram(s) IV Push once  dextrose 50% Injectable 25 Gram(s) IV Push once  fentaNYL   Patch  12 MICROgram(s)/Hr 1 Patch Transdermal every 72 hours  ferrous    sulfate Liquid 300 milliGRAM(s) Enteral Tube daily  furosemide    Tablet 40 milliGRAM(s) Oral daily  gabapentin   Solution 300 milliGRAM(s) Oral two times a day  glucagon  Injectable 1 milliGRAM(s) IntraMuscular once PRN  insulin lispro (HumaLOG) corrective regimen sliding scale   SubCutaneous every 6 hours  lisinopril 5 milliGRAM(s) Oral daily  medical marijuana oil 1 milliLiter(s),medical marijuana oil 1 milliLiter(s) 1 milliLiter(s) SubLingual <User Schedule>  minocycline 100 milliGRAM(s) Oral two times a day  mirtazapine 7.5 milliGRAM(s) Oral at bedtime  nystatin Powder 1 Application(s) Topical two times a day PRN  pantoprazole   Suspension 40 milliGRAM(s) Oral before breakfast  predniSONE   Tablet 7.5 milliGRAM(s) Oral <User Schedule>  QUEtiapine 25 milliGRAM(s) Oral at bedtime  sertraline 50 milliGRAM(s) Oral daily  tiotropium 18 MICROgram(s) Capsule 1 Capsule(s) Inhalation daily  tiZANidine 4 milliGRAM(s) Oral <User Schedule>    PAST MEDICAL & SURGICAL HISTORY:  CVA (cerebral vascular accident)  Fatty pancreas  PNA (pneumonia)  Pulmonary HTN  IGT (impaired glucose tolerance)  Ulcerative colitis  Acid reflux  Anxiety  Depression  Mouth sores  HLD (hyperlipidemia)  Asthma  Humeral head fracture  H/O: hysterectomy  H/O cataract extraction, left  History of knee replacement    Vitals:  T(C): 37.5 (12-18-18 @ 12:20), Max: 37.5 (12-18-18 @ 12:20)  HR: 99 (12-18-18 @ 12:20) (93 - 99)  BP: 127/58 (12-18-18 @ 12:20) (127/58 - 157/77)  BP(mean): --  RR: 18 (12-18-18 @ 12:20) (18 - 18)  SpO2: 96% (12-18-18 @ 12:20) (94% - 96%)  Wt(kg): --  Daily     Daily   I&O's Summary    17 Dec 2018 07:01  -  18 Dec 2018 07:00  --------------------------------------------------------  IN: 0 mL / OUT: 1700 mL / NET: -1700 mL    18 Dec 2018 07:01  -  18 Dec 2018 20:55  --------------------------------------------------------  IN: 0 mL / OUT: 0 mL / NET: 0 mL        Physical Exam:  Appearance: appears older than stated age; bedbound, demented; no acute distress  Eyes: PERRL, EOMI, pink conjunctiva  HENT: Normal oral mucosa  Cardiovascular: RRR, S1, S2; no LE edema bilaterally; normal JVP  Respiratory: poor inspiratory effort  Gastrointestinal: soft, non-tender, non-distended with normal bowel sounds; +PEG  Musculoskeletal: Bedbound, contracted  Neurologic: cranial nerves grossly intact  Lymphatic: No lymphadenopathy  Psychiatry: awake and alert  Skin: No rashes, ecchymoses, or cyanosis                          9.9    11.74 )-----------( 334      ( 18 Dec 2018 08:10 )             30.7     12-18    143  |  103  |  80<H>  ----------------------------<  113<H>  4.0   |  24  |  0.75    Ca    9.9      18 Dec 2018 06:52  Phos  3.7     12-18  Mg     2.2     12-18              Echo: < from: Transthoracic Echocardiogram (12.03.18 @ 11:23) >  ------------------------------------------------------------------------  Conclusions:  1. Mitral annular calcification, otherwise normal mitral  valve. Moderate-severe mitral regurgitation (vena contracta  0.8 cm)  2. Calcified trileafletaortic valve with normal opening.  Peak transaortic valve gradient equals 3 mm Hg, mean  transaortic valve gradient equals 1 mm Hg, aortic valve  velocity time integral equals 13 cm. Moderate aortic  regurgitation  3. Moderately dilated left atrium.LA volume index = 45  cc/m2.  4. Severe global left ventricular systolic dysfunction.  5. Increased E/e'  is consistent with elevated left  ventricular filling pressure.  6. Moderate right atrial enlargement. Inferior vena cava is  dilated (>=2.5cm) with normal respiratory variability  consistent with right atrial pressure 16-20mm Hg.  7. Right ventricular enlargement with decreased right  ventricular systolic function.  8. Normal tricuspid valve. Severe tricuspid regurgitation.  9. Estimated pulmonary artery systolic pressure equals 82  mm Hg, assuming right atrial pressure equals 8 mm Hg,  consistent with severe pulmonary pressures.  10. Color Doppler demonstrates evidence of left to right  shunt  ------------------------------------------------------------------------  Confirmed on  12/3/2018 - 16:37:07 by Margaret Khan M.D.  ------------------------------------------------------------------------    < end of copied text >    Imaging: < from: Xray Chest 1 View- PORTABLE-Urgent (12.02.18 @ 17:19) >  IMPRESSION:   Limited study due to patient positioning.  Trace right pleural effusion and likely left pleural effusion. Question   mild pulmonary edema.    HUAN JACOBS M.D., RADIOLOGY RESIDENT  This document has been electronically signed.  TONYA AARON M.D., ATTENDING RADIOLOGIST  This document has been electronically signed. Dec  2 2018  9:17PM      < end of copied text >    < from: [facility] Duplex Lower Ext Vein Scan, Bilat (12.15.18 @ 14:01) >  IMPRESSION:     Acute, above the knee deep venous thrombosis extending from the right   common femoral vein through the popliteal vein and into the right   gastrocnemius vein.    No left lower extremity DVT.    The above findings were discussed with BENJI Ryan by Dr. Tapia on   12/15/2018 2:43 PM, with read-back verification.    YELENA TAPIA M.D., RADIOLOGY RESIDENT  This document has been electronically signed.  PRIMO MURILLO M.D., ATTENDING RADIOLOGIST  This document has been electronically signed. Dec 15 2018  3:55PM        < end of copied text >    < from: NM Pulmonary Ventilation/Perfusion Scan (12.15.18 @ 10:13) >  FINDINGS: There is heterogeneous distribution of radiopharmaceutical in   both lungs on the ventilation and perfusion images. There are no   segmental perfusion defects in either lung. No significant interval   change since the previous study of 12/3/2018.    IMPRESSION: Very low probability of pulmonary embolus.    KRYSTYNA JULIAN M.D., CHIEF OF NUCLEAR MEDICINE  This document has been electronically signed. Dec 15 2018 10:15AM    < end of copied text >    Interpretation of Telemetry: sinus rhythm

## 2018-12-18 NOTE — PROGRESS NOTE ADULT - SUBJECTIVE AND OBJECTIVE BOX
---___---___---___---___---___---___ ---___---___---___---___---___---___---___---___---___---                    <<<  M E D I C A L   A T T E N D I N G    F O L L O W    U P   N O T E  >>>    - Patient seen and examined by me approximately thirty minutes ago.  - In summary, patient is a 68y year old Female who origianlly presented with elevated bnp and new onset dvt   - Patient today overall doing ok, comfortable, eating OK.     ---___---___---___---___---___---      <<<  MEDICATIONS:  >>>    MEDICATIONS  (STANDING):  ALBUTerol/ipratropium for Nebulization 3 milliLiter(s) Nebulizer every 6 hours  apixaban 10 milliGRAM(s) Oral every 12 hours  AQUAPHOR (petrolatum Ointment) 1 Application(s) Topical three times a day  buDESOnide 160 MICROgram(s)/formoterol 4.5 MICROgram(s) Inhaler 2 Puff(s) Inhalation two times a day  clobetasol 0.05% Ointment 1 Application(s) Topical two times a day  dextrose 5%. 1000 milliLiter(s) (50 mL/Hr) IV Continuous <Continuous>  dextrose 50% Injectable 12.5 Gram(s) IV Push once  dextrose 50% Injectable 25 Gram(s) IV Push once  dextrose 50% Injectable 25 Gram(s) IV Push once  fentaNYL   Patch  12 MICROgram(s)/Hr 1 Patch Transdermal every 72 hours  ferrous    sulfate Liquid 300 milliGRAM(s) Enteral Tube daily  furosemide    Tablet 40 milliGRAM(s) Oral daily  gabapentin   Solution 300 milliGRAM(s) Oral two times a day  insulin lispro (HumaLOG) corrective regimen sliding scale   SubCutaneous every 6 hours  lisinopril 5 milliGRAM(s) Oral daily  minocycline 100 milliGRAM(s) Oral two times a day  mirtazapine 7.5 milliGRAM(s) Oral at bedtime  pantoprazole   Suspension 40 milliGRAM(s) Oral before breakfast  predniSONE   Tablet 7.5 milliGRAM(s) Oral <User Schedule>  QUEtiapine 25 milliGRAM(s) Oral at bedtime  sertraline 50 milliGRAM(s) Oral daily  tiotropium 18 MICROgram(s) Capsule 1 Capsule(s) Inhalation daily  tiZANidine 4 milliGRAM(s) Oral <User Schedule>      MEDICATIONS  (PRN):  acetaminophen    Suspension .. 680 milliGRAM(s) Oral every 6 hours PRN Temp greater or equal to 38C (100.4F), Mild Pain (1 - 3)  dextrose 40% Gel 15 Gram(s) Oral once PRN Blood Glucose LESS THAN 70 milliGRAM(s)/deciliter  glucagon  Injectable 1 milliGRAM(s) IntraMuscular once PRN Glucose LESS THAN 70 milligrams/deciliter  nystatin Powder 1 Application(s) Topical two times a day PRN rash/itchiness       ---___---___---___---___---___---     <<<REVIEW OF SYSTEM: >>>    GEN: no fever, no chills, no pain  RESP: no SOB, no cough, no sputum  CVS: no chest pain, no palpitations, no edema  GI: no abdominal pain, no nausea, no vomiting, no constipation, no diarrhea  : no dysurea, no frequency  NEURO: no headache, no dizziness  PSYCH: no depression, not anxious  Derm : no itching, no rash     ---___---___---___---___---___---          <<<  VITAL SIGNS: >>>    T(F): 99.1 (12-18-18 @ 10:31), Max: 100.7 (12-17-18 @ 19:52)  HR: 93 (12-18-18 @ 06:55) (91 - 93)  BP: 157/77 (12-18-18 @ 06:55) (92/59 - 157/77)  RR: 18 (12-18-18 @ 06:55) (18 - 18)  SpO2: 94% (12-18-18 @ 06:55) (94% - 95%)  Wt(kg): --  CAPILLARY BLOOD GLUCOSE      POCT Blood Glucose.: 125 mg/dL (18 Dec 2018 06:39)    I&O's Summary    17 Dec 2018 07:01  -  18 Dec 2018 07:00  --------------------------------------------------------  IN: 0 mL / OUT: 1700 mL / NET: -1700 mL    18 Dec 2018 07:01  -  18 Dec 2018 10:44  --------------------------------------------------------  IN: 0 mL / OUT: 0 mL / NET: 0 mL         ---___---___---___---___---___---                       PHYSICAL EXAM:    GEN: A&O X 3 , NAD , comfortable  HEENT: NCAT, PERRL, MMM, no scleral icterus, hearing intact  NECK: Supple, No JVD  CVS: S1S2 , regular , No M/R/G appreciated  PULM: CTA B/L,  no W/R/R appreciated  ABD.: soft. non tender, non distended,  bowel sounds present peg noted   Extrem: intact pulses , no edema noted  Derm:  sacral decubitus ulcer   PSYCH: normal mood, no depression, not anxious     ---___---___---___---___---___---     <<<  LAB AND IMAGING: >>>                          9.9    11.74 )-----------( 334      ( 18 Dec 2018 08:10 )             30.7               12-18    143  |  103  |  80<H>  ----------------------------<  113<H>  4.0   |  24  |  0.75    Ca    9.9      18 Dec 2018 06:52  Phos  3.7     12-18  Mg     2.2     12-18                                 [All pertinent / recent available Imaging reports and other labs reviewed]     ---___---___---___---___---___---___ ---___---___---___---___---           <<<  A S S E S S M E N T   A N D   P L A N :  >>>          -GI/DVT Prophylaxis.    --------------------------------------------     >>______________________<<      Deniz Bolden .         phone   [phone]

## 2018-12-19 LAB
ANION GAP SERPL CALC-SCNC: 16 MMOL/L — SIGNIFICANT CHANGE UP (ref 5–17)
BASOPHILS # BLD AUTO: 0.05 K/UL — SIGNIFICANT CHANGE UP (ref 0–0.2)
BASOPHILS NFR BLD AUTO: 0.4 % — SIGNIFICANT CHANGE UP (ref 0–2)
BUN SERPL-MCNC: 77 MG/DL — HIGH (ref 7–23)
CALCIUM SERPL-MCNC: 9.5 MG/DL — SIGNIFICANT CHANGE UP (ref 8.4–10.5)
CHLORIDE SERPL-SCNC: 101 MMOL/L — SIGNIFICANT CHANGE UP (ref 96–108)
CO2 SERPL-SCNC: 25 MMOL/L — SIGNIFICANT CHANGE UP (ref 22–31)
CREAT SERPL-MCNC: 0.68 MG/DL — SIGNIFICANT CHANGE UP (ref 0.5–1.3)
CULTURE RESULTS: SIGNIFICANT CHANGE UP
CULTURE RESULTS: SIGNIFICANT CHANGE UP
EOSINOPHIL # BLD AUTO: 0.85 K/UL — HIGH (ref 0–0.5)
EOSINOPHIL NFR BLD AUTO: 7.1 % — HIGH (ref 0–6)
GLUCOSE BLDC GLUCOMTR-MCNC: 129 MG/DL — HIGH (ref 70–99)
GLUCOSE BLDC GLUCOMTR-MCNC: 142 MG/DL — HIGH (ref 70–99)
GLUCOSE BLDC GLUCOMTR-MCNC: 163 MG/DL — HIGH (ref 70–99)
GLUCOSE BLDC GLUCOMTR-MCNC: 178 MG/DL — HIGH (ref 70–99)
GLUCOSE BLDC GLUCOMTR-MCNC: 202 MG/DL — HIGH (ref 70–99)
GLUCOSE SERPL-MCNC: 111 MG/DL — HIGH (ref 70–99)
HCT VFR BLD CALC: 32.6 % — LOW (ref 34.5–45)
HGB BLD-MCNC: 10.5 G/DL — LOW (ref 11.5–15.5)
IMM GRANULOCYTES NFR BLD AUTO: 0.2 % — SIGNIFICANT CHANGE UP (ref 0–1.5)
LYMPHOCYTES # BLD AUTO: 1.24 K/UL — SIGNIFICANT CHANGE UP (ref 1–3.3)
LYMPHOCYTES # BLD AUTO: 10.4 % — LOW (ref 13–44)
MCHC RBC-ENTMCNC: 30.1 PG — SIGNIFICANT CHANGE UP (ref 27–34)
MCHC RBC-ENTMCNC: 32.2 GM/DL — SIGNIFICANT CHANGE UP (ref 32–36)
MCV RBC AUTO: 93.4 FL — SIGNIFICANT CHANGE UP (ref 80–100)
MONOCYTES # BLD AUTO: 1.04 K/UL — HIGH (ref 0–0.9)
MONOCYTES NFR BLD AUTO: 8.7 % — SIGNIFICANT CHANGE UP (ref 2–14)
NEUTROPHILS # BLD AUTO: 8.78 K/UL — HIGH (ref 1.8–7.4)
NEUTROPHILS NFR BLD AUTO: 73.2 % — SIGNIFICANT CHANGE UP (ref 43–77)
PLATELET # BLD AUTO: 347 K/UL — SIGNIFICANT CHANGE UP (ref 150–400)
POTASSIUM SERPL-MCNC: 4.1 MMOL/L — SIGNIFICANT CHANGE UP (ref 3.5–5.3)
POTASSIUM SERPL-SCNC: 4.1 MMOL/L — SIGNIFICANT CHANGE UP (ref 3.5–5.3)
RBC # BLD: 3.49 M/UL — LOW (ref 3.8–5.2)
RBC # FLD: 16.3 % — HIGH (ref 10.3–14.5)
SODIUM SERPL-SCNC: 142 MMOL/L — SIGNIFICANT CHANGE UP (ref 135–145)
SPECIMEN SOURCE: SIGNIFICANT CHANGE UP
SPECIMEN SOURCE: SIGNIFICANT CHANGE UP
WBC # BLD: 11.98 K/UL — HIGH (ref 3.8–10.5)
WBC # FLD AUTO: 11.98 K/UL — HIGH (ref 3.8–10.5)

## 2018-12-19 PROCEDURE — 99233 SBSQ HOSP IP/OBS HIGH 50: CPT

## 2018-12-19 RX ADMIN — PANTOPRAZOLE SODIUM 40 MILLIGRAM(S): 20 TABLET, DELAYED RELEASE ORAL at 06:12

## 2018-12-19 RX ADMIN — MINOCYCLINE HYDROCHLORIDE 100 MILLIGRAM(S): 45 TABLET, EXTENDED RELEASE ORAL at 17:56

## 2018-12-19 RX ADMIN — FENTANYL CITRATE 1 PATCH: 50 INJECTION INTRAVENOUS at 07:27

## 2018-12-19 RX ADMIN — Medication 300 MILLIGRAM(S): at 12:20

## 2018-12-19 RX ADMIN — APIXABAN 10 MILLIGRAM(S): 2.5 TABLET, FILM COATED ORAL at 17:55

## 2018-12-19 RX ADMIN — Medication 2: at 12:21

## 2018-12-19 RX ADMIN — Medication 1 APPLICATION(S): at 17:56

## 2018-12-19 RX ADMIN — TIOTROPIUM BROMIDE 1 CAPSULE(S): 18 CAPSULE ORAL; RESPIRATORY (INHALATION) at 12:20

## 2018-12-19 RX ADMIN — Medication 3 MILLILITER(S): at 17:57

## 2018-12-19 RX ADMIN — SERTRALINE 50 MILLIGRAM(S): 25 TABLET, FILM COATED ORAL at 12:20

## 2018-12-19 RX ADMIN — TIZANIDINE 4 MILLIGRAM(S): 4 TABLET ORAL at 09:34

## 2018-12-19 RX ADMIN — APIXABAN 10 MILLIGRAM(S): 2.5 TABLET, FILM COATED ORAL at 05:55

## 2018-12-19 RX ADMIN — Medication 3 MILLILITER(S): at 05:55

## 2018-12-19 RX ADMIN — Medication 3 MILLILITER(S): at 12:20

## 2018-12-19 RX ADMIN — LISINOPRIL 5 MILLIGRAM(S): 2.5 TABLET ORAL at 05:55

## 2018-12-19 RX ADMIN — Medication 1 APPLICATION(S): at 05:58

## 2018-12-19 RX ADMIN — QUETIAPINE FUMARATE 25 MILLIGRAM(S): 200 TABLET, FILM COATED ORAL at 22:02

## 2018-12-19 RX ADMIN — BUDESONIDE AND FORMOTEROL FUMARATE DIHYDRATE 2 PUFF(S): 160; 4.5 AEROSOL RESPIRATORY (INHALATION) at 17:57

## 2018-12-19 RX ADMIN — TIZANIDINE 4 MILLIGRAM(S): 4 TABLET ORAL at 22:02

## 2018-12-19 RX ADMIN — Medication 7.5 MILLIGRAM(S): at 09:34

## 2018-12-19 RX ADMIN — Medication 3 MILLILITER(S): at 01:02

## 2018-12-19 RX ADMIN — Medication 1: at 01:02

## 2018-12-19 RX ADMIN — BUDESONIDE AND FORMOTEROL FUMARATE DIHYDRATE 2 PUFF(S): 160; 4.5 AEROSOL RESPIRATORY (INHALATION) at 05:55

## 2018-12-19 RX ADMIN — GABAPENTIN 300 MILLIGRAM(S): 400 CAPSULE ORAL at 05:57

## 2018-12-19 RX ADMIN — Medication 1 APPLICATION(S): at 22:03

## 2018-12-19 RX ADMIN — MIRTAZAPINE 7.5 MILLIGRAM(S): 45 TABLET, ORALLY DISINTEGRATING ORAL at 22:02

## 2018-12-19 RX ADMIN — GABAPENTIN 300 MILLIGRAM(S): 400 CAPSULE ORAL at 17:56

## 2018-12-19 RX ADMIN — Medication 680 MILLIGRAM(S): at 22:02

## 2018-12-19 RX ADMIN — Medication 0.5 MILLIGRAM(S): at 22:02

## 2018-12-19 RX ADMIN — MINOCYCLINE HYDROCHLORIDE 100 MILLIGRAM(S): 45 TABLET, EXTENDED RELEASE ORAL at 05:55

## 2018-12-19 RX ADMIN — Medication 40 MILLIGRAM(S): at 05:55

## 2018-12-19 RX ADMIN — Medication 1: at 17:57

## 2018-12-19 NOTE — PROGRESS NOTE ADULT - SUBJECTIVE AND OBJECTIVE BOX
---___---___---___---___---___---___ ---___---___---___---___---___---___---___---___---___---                    <<<  M E D I C A L   A T T E N D I N G    F O L L O W    U P   N O T E  >>>    - Patient seen and examined by me approximately thirty minutes ago.  - In summary, patient is a 68y year old Female who origianlly presented with chf now with dvt   - Patient today overall doing ok, comfortable,has a peg has not been eating po since admission      ---___---___---___---___---___---      <<<  MEDICATIONS:  >>>    MEDICATIONS  (STANDING):  ALBUTerol/ipratropium for Nebulization 3 milliLiter(s) Nebulizer every 6 hours  apixaban 10 milliGRAM(s) Oral every 12 hours  AQUAPHOR (petrolatum Ointment) 1 Application(s) Topical three times a day  buDESOnide 160 MICROgram(s)/formoterol 4.5 MICROgram(s) Inhaler 2 Puff(s) Inhalation two times a day  clobetasol 0.05% Ointment 1 Application(s) Topical two times a day  clonazePAM Tablet 0.5 milliGRAM(s) Oral at bedtime  dextrose 5%. 1000 milliLiter(s) (50 mL/Hr) IV Continuous <Continuous>  dextrose 50% Injectable 12.5 Gram(s) IV Push once  dextrose 50% Injectable 25 Gram(s) IV Push once  dextrose 50% Injectable 25 Gram(s) IV Push once  fentaNYL   Patch  12 MICROgram(s)/Hr 1 Patch Transdermal every 72 hours  ferrous    sulfate Liquid 300 milliGRAM(s) Enteral Tube daily  furosemide    Tablet 40 milliGRAM(s) Oral daily  gabapentin   Solution 300 milliGRAM(s) Oral two times a day  insulin lispro (HumaLOG) corrective regimen sliding scale   SubCutaneous every 6 hours  lisinopril 5 milliGRAM(s) Oral daily  medical marijuana oil 1 milliLiter(s),medical marijuana oil 1 milliLiter(s) 1 milliLiter(s) SubLingual <User Schedule>  minocycline 100 milliGRAM(s) Oral two times a day  mirtazapine 7.5 milliGRAM(s) Oral at bedtime  pantoprazole   Suspension 40 milliGRAM(s) Oral before breakfast  predniSONE   Tablet 7.5 milliGRAM(s) Oral <User Schedule>  QUEtiapine 25 milliGRAM(s) Oral at bedtime  sertraline 50 milliGRAM(s) Oral daily  tiotropium 18 MICROgram(s) Capsule 1 Capsule(s) Inhalation daily  tiZANidine 4 milliGRAM(s) Oral <User Schedule>      MEDICATIONS  (PRN):  acetaminophen    Suspension .. 680 milliGRAM(s) Oral every 6 hours PRN Temp greater or equal to 38C (100.4F), Mild Pain (1 - 3)  dextrose 40% Gel 15 Gram(s) Oral once PRN Blood Glucose LESS THAN 70 milliGRAM(s)/deciliter  glucagon  Injectable 1 milliGRAM(s) IntraMuscular once PRN Glucose LESS THAN 70 milligrams/deciliter  nystatin Powder 1 Application(s) Topical two times a day PRN rash/itchiness       ---___---___---___---___---___---     <<<REVIEW OF SYSTEM: >>>    GEN: no fever, no chills, no pain  RESP: no SOB, no cough, no sputum  CVS: no chest pain, no palpitations, no edema  GI: no abdominal pain, no nausea, no vomiting, no constipation, no diarrhea  : no dysurea, no frequency  NEURO: no headache, no dizziness  PSYCH: no depression, not anxious  Derm : no itching, no rash     ---___---___---___---___---___---          <<<  VITAL SIGNS: >>>    T(F): 97.5 (12-19-18 @ 05:35), Max: 99.9 (12-18-18 @ 22:28)  HR: 90 (12-19-18 @ 05:35) (90 - 99)  BP: 120/69 (12-19-18 @ 05:35) (112/67 - 131/79)  RR: 18 (12-19-18 @ 05:35) (18 - 18)  SpO2: 94% (12-19-18 @ 05:35) (93% - 97%)  Wt(kg): --  CAPILLARY BLOOD GLUCOSE      POCT Blood Glucose.: 202 mg/dL (19 Dec 2018 12:01)    I&O's Summary    18 Dec 2018 07:01  -  19 Dec 2018 07:00  --------------------------------------------------------  IN: 600 mL / OUT: 1050 mL / NET: -450 mL         ---___---___---___---___---___---                       PHYSICAL EXAM:    GEN: A&O X 3 , NAD , comfortable  HEENT: NCAT, PERRL, MMM, no scleral icterus, hearing intact  NECK: Supple, No JVD  CVS: S1S2 , regular , No M/R/G appreciated  PULM: CTA B/L,  no W/R/R appreciated  ABD.: soft. non tender, non distended,  bowel sounds present peg noted   Extrem: intact pulses , no edema noted  Derm: small wound to the right knee noted with little serosanguineous fluid  PSYCH: normal mood, no depression, not anxious     ---___---___---___---___---___---     <<<  LAB AND IMAGING: >>>                          10.5   11.98 )-----------( 347      ( 19 Dec 2018 08:05 )             32.6               12-19    142  |  101  |  77<H>  ----------------------------<  111<H>  4.1   |  25  |  0.68    Ca    9.5      19 Dec 2018 06:11  Phos  3.7     12-18  Mg     2.2     12-18                                 [All pertinent / recent available Imaging reports and other labs reviewed]     ---___---___---___---___---___---___ ---___---___---___---___---           <<<  A S S E S S M E N T   DEO N YE   P L A N :  >>>          -GI/DVT Prophylaxis.    --------------------------------------------  Case discussed with   Education given on     >>______________________<<      Deniz Bolden .         phone   [phone]

## 2018-12-19 NOTE — PROGRESS NOTE ADULT - PROBLEM SELECTOR PLAN 5
cardiology following   trending troponin  monitor labs   follow bmp   cardiology on board
insulin coverage as necessary
continue budesonide  monitor blood sugar
continue current medication
continue meds
continue meds
has bee stable on budesonide
on insulin dur to chronic steroid use

## 2018-12-19 NOTE — OCCUPATIONAL THERAPY INITIAL EVALUATION ADULT - LIVES WITH, PROFILE
lives with spouse in a private home and requires total A for all ADLs and pablo lift for mobility. Pt was receiving home OT/PT services

## 2018-12-19 NOTE — OCCUPATIONAL THERAPY INITIAL EVALUATION ADULT - ADDITIONAL COMMENTS
Per the family, her breathing is normal. Her PMD 5 days ago got some blood tests sent off which resulted in an elevated proBNP. NM Pulm: Very low probability of pulmonary embolus. Doppler : Acute, above the knee deep venous thrombosis extending from the right common femoral vein through the popliteal vein and into the right gastrocnemius vein.

## 2018-12-19 NOTE — CHART NOTE - NSCHARTNOTEFT_GEN_A_CORE
MEDICINE NP    Notified by RN patient with temperature of 1020.1 . Seen and examined patient with daughter at bedside. Patient is A & O X 0, NAD, unable to obtain ROS as pt non-verbal.     VITAL SIGNS:  T(C): 38.9 (12-19-18 @ 20:28), Max: 38.9 (12-19-18 @ 20:28)  HR: 106 (12-19-18 @ 20:28) (88 - 106)  BP: 123/74 (12-19-18 @ 20:28) (107/66 - 123/74)  RR: 18 (12-19-18 @ 20:28) (17 - 18)  SpO2: 93% (12-19-18 @ 20:28) (93% - 94%)  Wt(kg): --      LABORATORY:                          10.5   11.98 )-----------( 347      ( 19 Dec 2018 08:05 )             32.6       12-19    142  |  101  |  77<H>  ----------------------------<  111<H>  4.1   |  25  |  0.68    Ca    9.5      19 Dec 2018 06:11  Phos  3.7     12-18  Mg     2.2     12-18            MICROBIOLOGY:   Culture - Urine (12.14.18 @ 13:19)    Specimen Source: .Urine Clean Catch (Midstream)    Culture Results:   50,000 - 99,000 CFU/mL Yeast-like cells, presumptively not Paola  albicans      PHYSICAL EXAM:    Constitutional: A & O X 0, NAD   Respiratory: clear lungs bilaterally. No wheezing, rhonchi, or crackles.  Cardiovascular: S1 S2.   Gastrointestinal: BS X4 active. soft. nontender.  Extremities/Vascular: +2 pulses bilaterally. No BLE edema.      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  Chronic Gavin/ UTI/ Pseudomonas CRE / With recurrent low grade fevers concerning for ?Aspiration, completed a full course of Zerbaxa, repeat ucx was reported to growing  yeast which is a colonizing vince now with fever of 102.1    Fever  -tylenol and cooling measures prn for pyrexia  -chronic gavin, changed today  -BC x2, UA/UC  -c/w Minocycline (for MRSA suppression)   -Wll continue to monitor   -F/U PMD and ID in AM     Deepti Regalado NP  [id number] MEDICINE NP    Notified by RN patient with temperature of 102.1 . Seen and examined patient with daughter at bedside. Patient is A & O X 0, NAD, unable to obtain ROS as pt non-verbal.     VITAL SIGNS:  T(C): 38.9 (12-19-18 @ 20:28), Max: 38.9 (12-19-18 @ 20:28)  HR: 106 (12-19-18 @ 20:28) (88 - 106)  BP: 123/74 (12-19-18 @ 20:28) (107/66 - 123/74)  RR: 18 (12-19-18 @ 20:28) (17 - 18)  SpO2: 93% (12-19-18 @ 20:28) (93% - 94%)  Wt(kg): --      LABORATORY:                          10.5   11.98 )-----------( 347      ( 19 Dec 2018 08:05 )             32.6       12-19    142  |  101  |  77<H>  ----------------------------<  111<H>  4.1   |  25  |  0.68    Ca    9.5      19 Dec 2018 06:11  Phos  3.7     12-18  Mg     2.2     12-18            MICROBIOLOGY:   Culture - Urine (12.14.18 @ 13:19)    Specimen Source: .Urine Clean Catch (Midstream)    Culture Results:   50,000 - 99,000 CFU/mL Yeast-like cells, presumptively not Paola  albicans      PHYSICAL EXAM:    Constitutional: A & O X 0, NAD   Respiratory: clear lungs bilaterally. No wheezing, rhonchi, or crackles.  Cardiovascular: S1 S2.   Gastrointestinal: BS X4 active. soft. nontender.  Extremities/Vascular: +2 pulses bilaterally. No BLE edema.      ASSESSMENT/PLAN:   69y/o Female with PMHX of Advanced dementia, Rt. 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  Chronic Gavin/ UTI/ Pseudomonas CRE / With recurrent low grade fevers concerning for ?Aspiration, completed a full course of Zerbaxa, repeat ucx was reported to growing  yeast which is a colonizing vince now with fever of 102.1    Fever  -tylenol and cooling measures prn for pyrexia  -chronic gavin  -BC x2, UA/UC  -c/w Minocycline (for MRSA suppression)   -Wll continue to monitor   -F/U PMD and ID in AM     Deepti Regalado NP  [id number]

## 2018-12-19 NOTE — PROGRESS NOTE ADULT - ASSESSMENT
Assessment:  Advanced dementia, right thr mrsa infection s/p leisa and spacer on suppressive minocycline, recurrent aspiration events, chronic gavin completed  tx for cre pseudomonas  The patient has completed a full course of Zerbaxa as per notes in the chart   repeat ucx was reported to growing  yeast which is a colonizing vince   reviewed her wbc are stable   reviewed her flow sheets she was noted prior low grade fever is likely secondary DVT inflammatory   her fevers have resolved  no active ID issues at present     Plan:   continue supportive care as per primary medical team   Reviewed above findings and that patient has been afebrile , no signs of infection   DC planning for today or tomorrow per medical service   Will sign off Re consult us as needed

## 2018-12-19 NOTE — PROGRESS NOTE ADULT - PROBLEM SELECTOR PROBLEM 5
Congestive heart failure with left ventricular systolic dysfunction
IGT (impaired glucose tolerance)
Anxiety
Anxiety
Asthma
Asthma
IGT (impaired glucose tolerance)
Moderate episode of recurrent major depressive disorder
PNA (pneumonia)
Ulcerative colitis

## 2018-12-19 NOTE — PROGRESS NOTE ADULT - SUBJECTIVE AND OBJECTIVE BOX
HPI:  No further fevers.  Tolerating anticoagulation well without active bleeding - H/H stable.  Patient's  at bedside.    Review Of Systems:     Unable to assess in setting of dementia    Medications:  acetaminophen    Suspension .. 680 milliGRAM(s) Oral every 6 hours PRN  ALBUTerol/ipratropium for Nebulization 3 milliLiter(s) Nebulizer every 6 hours  apixaban 10 milliGRAM(s) Oral every 12 hours  AQUAPHOR (petrolatum Ointment) 1 Application(s) Topical three times a day  buDESOnide 160 MICROgram(s)/formoterol 4.5 MICROgram(s) Inhaler 2 Puff(s) Inhalation two times a day  clobetasol 0.05% Ointment 1 Application(s) Topical two times a day  clonazePAM Tablet 0.5 milliGRAM(s) Oral at bedtime  dextrose 40% Gel 15 Gram(s) Oral once PRN  dextrose 5%. 1000 milliLiter(s) IV Continuous <Continuous>  dextrose 50% Injectable 12.5 Gram(s) IV Push once  dextrose 50% Injectable 25 Gram(s) IV Push once  dextrose 50% Injectable 25 Gram(s) IV Push once  fentaNYL   Patch  12 MICROgram(s)/Hr 1 Patch Transdermal every 72 hours  ferrous    sulfate Liquid 300 milliGRAM(s) Enteral Tube daily  furosemide    Tablet 40 milliGRAM(s) Oral daily  gabapentin   Solution 300 milliGRAM(s) Oral two times a day  glucagon  Injectable 1 milliGRAM(s) IntraMuscular once PRN  insulin lispro (HumaLOG) corrective regimen sliding scale   SubCutaneous every 6 hours  lisinopril 5 milliGRAM(s) Oral daily  medical marijuana oil 1 milliLiter(s),medical marijuana oil 1 milliLiter(s) 1 milliLiter(s) SubLingual <User Schedule>  minocycline 100 milliGRAM(s) Oral two times a day  mirtazapine 7.5 milliGRAM(s) Oral at bedtime  nystatin Powder 1 Application(s) Topical two times a day PRN  pantoprazole   Suspension 40 milliGRAM(s) Oral before breakfast  predniSONE   Tablet 7.5 milliGRAM(s) Oral <User Schedule>  QUEtiapine 25 milliGRAM(s) Oral at bedtime  sertraline 50 milliGRAM(s) Oral daily  tiotropium 18 MICROgram(s) Capsule 1 Capsule(s) Inhalation daily  tiZANidine 4 milliGRAM(s) Oral <User Schedule>    PAST MEDICAL & SURGICAL HISTORY:  CVA (cerebral vascular accident)  Fatty pancreas  PNA (pneumonia)  Pulmonary HTN  IGT (impaired glucose tolerance)  Ulcerative colitis  Acid reflux  Anxiety  Depression  Mouth sores  HLD (hyperlipidemia)  Asthma  Humeral head fracture  H/O: hysterectomy  H/O cataract extraction, left  History of knee replacement    Vitals:  T(C): 38.9 (18 @ 20:28), Max: 38.9 (18 @ 20:28)  HR: 88 (18 @ 12:04) (88 - 97)  BP: 107/66 (18 @ 12:04) (107/66 - 131/79)  BP(mean): --  RR: 17 (18 @ 12:04) (17 - 18)  SpO2: 94% (18 @ 12:04) (93% - 97%)  Wt(kg): --  Daily     Daily Weight in k.2 (19 Dec 2018 07:37)  I&O's Summary    18 Dec 2018 07:01  -  19 Dec 2018 07:00  --------------------------------------------------------  IN: 600 mL / OUT: 1050 mL / NET: -450 mL    19 Dec 2018 07:01  -  19 Dec 2018 20:38  --------------------------------------------------------  IN: 0 mL / OUT: 900 mL / NET: -900 mL        Physical Exam:  Appearance: appears older than stated age; bedbound, demented; no acute distress  Eyes: PERRL, EOMI, pink conjunctiva  HENT: Normal oral mucosa  Cardiovascular: RRR, S1, S2; no LE edema bilaterally; normal JVP  Respiratory: poor inspiratory effort  Gastrointestinal: soft, non-tender, non-distended with normal bowel sounds; +PEG  Musculoskeletal: Bedbound, contracted  Neurologic: cranial nerves grossly intact  Lymphatic: No lymphadenopathy  Psychiatry: awake and alert  Skin: No rashes, ecchymoses, or cyanosis                          10.5   11.98 )-----------( 347      ( 19 Dec 2018 08:05 )             32.6     12-    142  |  101  |  77<H>  ----------------------------<  111<H>  4.1   |  25  |  0.68    Ca    9.5      19 Dec 2018 06:11  Phos  3.7       Mg     2.2                           Echo: < from: Transthoracic Echocardiogram (18 @ 11:23) >  ------------------------------------------------------------------------  Conclusions:  1. Mitral annular calcification, otherwise normal mitral  valve. Moderate-severe mitral regurgitation (vena contracta  0.8 cm)  2. Calcified trileafletaortic valve with normal opening.  Peak transaortic valve gradient equals 3 mm Hg, mean  transaortic valve gradient equals 1 mm Hg, aortic valve  velocity time integral equals 13 cm. Moderate aortic  regurgitation  3. Moderately dilated left atrium.LA volume index = 45  cc/m2.  4. Severe global left ventricular systolic dysfunction.  5. Increased E/e'  is consistent with elevated left  ventricular filling pressure.  6. Moderate right atrial enlargement. Inferior vena cava is  dilated (>=2.5cm) with normal respiratory variability  consistent with right atrial pressure 16-20mm Hg.  7. Right ventricular enlargement with decreased right  ventricular systolic function.  8. Normal tricuspid valve. Severe tricuspid regurgitation.  9. Estimated pulmonary artery systolic pressure equals 82  mm Hg, assuming right atrial pressure equals 8 mm Hg,  consistent with severe pulmonary pressures.  10. Color Doppler demonstrates evidence of left to right  shunt  ------------------------------------------------------------------------  Confirmed on  12/3/2018 - 16:37:07 by Margaret Khan M.D.  ------------------------------------------------------------------------    < end of copied text >    Imaging: < from: Xray Chest 1 View- PORTABLE-Urgent (18 @ 17:19) >  IMPRESSION:   Limited study due to patient positioning.  Trace right pleural effusion and likely left pleural effusion. Question   mild pulmonary edema.    HUAN JACOBS M.D., RADIOLOGY RESIDENT  This document has been electronically signed.  TONYA AARON M.D., ATTENDING RADIOLOGIST  This document has been electronically signed. Dec  2 2018  9:17PM      < end of copied text >    < from: [facility] Duplex Lower Ext Vein Scan, Bilat (12.15.18 @ 14:01) >  IMPRESSION:     Acute, above the knee deep venous thrombosis extending from the right   common femoral vein through the popliteal vein and into the right   gastrocnemius vein.    No left lower extremity DVT.    The above findings were discussed with NP Connor by Dr. Tapia on   12/15/2018 2:43 PM, with read-back verification.    YELENA TAPIA M.D., RADIOLOGY RESIDENT  This document has been electronically signed.  PRIMO MURILLO M.D., ATTENDING RADIOLOGIST  This document has been electronically signed. Dec 15 2018  3:55PM        < end of copied text >    < from: [facility] Pulmonary Ventilation/Perfusion Scan (12.15.18 @ 10:13) >  FINDINGS: There is heterogeneous distribution of radiopharmaceutical in   both lungs on the ventilation and perfusion images. There are no   segmental perfusion defects in either lung. No significant interval   change since the previous study of 12/3/2018.    IMPRESSION: Very low probability of pulmonary embolus.    KRYSTYNA JULIAN M.D., CHIEF OF NUCLEAR MEDICINE  This document has been electronically signed. Dec 15 2018 10:15AM    < end of copied text >    Interpretation of Telemetry: Patient taken off tele

## 2018-12-19 NOTE — OCCUPATIONAL THERAPY INITIAL EVALUATION ADULT - IMPAIRMENTS CONTRIBUTING IMPAIRED BED MOBILITY, REHAB EVAL
impaired balance/pain/decreased strength/impaired motor control/cognition/impaired postural control/decreased ROM

## 2018-12-19 NOTE — OCCUPATIONAL THERAPY INITIAL EVALUATION ADULT - DIAGNOSIS, OT EVAL
Pt demonstrate decreased strength, ROM, cognition impacting pt' ability to perform functional mobility and ADLs.

## 2018-12-19 NOTE — PROGRESS NOTE ADULT - SUBJECTIVE AND OBJECTIVE BOX
CC: f/u for uti completed treatment     S: Patient is in bed awake and alert she is currently afebrile     Vital Signs Last 24 Hrs  T(C): 36.4 (19 Dec 2018 05:35), Max: 37.7 (18 Dec 2018 22:28)  T(F): 97.5 (19 Dec 2018 05:35), Max: 99.9 (18 Dec 2018 22:28)  HR: 90 (19 Dec 2018 05:35) (90 - 99)  BP: 120/69 (19 Dec 2018 05:35) (112/67 - 131/79)  BP(mean): --  RR: 18 (19 Dec 2018 05:35) (18 - 18)  SpO2: 94% (19 Dec 2018 05:35) (93% - 97%)    PHYSICAL EXAM:  General: alert, no acute distress  Eyes:  anicteric, no conjunctival injection, no discharge  Oropharynx: no lesions or injection 	  Neck: supple, without adenopathy  Lungs: clear to auscultation  Heart: regular rate and rhythm; no murmur, rubs or gallops  Abdomen: soft, nondistended, nontender, without mass or organomegaly  Skin: no lesions  Extremities: no clubbing, cyanosis, or edema  Neurologic: alert, contracted    LAB RESULTS:                        10.5   11.98 )-----------( 347      ( 19 Dec 2018 08:05 )             32.6                           9.9    11.74 )-----------( 334      ( 18 Dec 2018 08:10 )             30.7                           10.1   11.13 )-----------( 325      ( 17 Dec 2018 07:48 )             31.2                           10.8   11.57 )-----------( 334      ( 15 Dec 2018 07:52 )             34.2     12-15    140  |  99  |  71<H>  ----------------------------<  139<H>  3.9   |  24  |  0.77    Ca    9.4      15 Dec 2018 06:13  Mg     2.0     12-14        Urinalysis Basic - ( 14 Dec 2018 08:59 )    Color: x / Appearance: x / SG: x / pH: x  Gluc: x / Ketone: x  / Bili: x / Urobili: x   Blood: x / Protein: x / Nitrite: x   Leuk Esterase: x / RBC: 12 /hpf / WBC 44 /HPF   Sq Epi: x / Non Sq Epi: 3 / Bacteria: x      MICROBIOLOGY:  RECENT CULTURES:  12-14 @ 13:19 .Urine Clean Catch (Midstream)     50,000 - 99,000 CFU/mL Yeast-like cells, presumptively not Candida  albicans      12-14 @ 07:28 .Blood Blood-Peripheral     No growth to date.      12-11 @ 09:37 .Blood Blood     No growth to date.          RADIOLOGY REVIEWED:    < from: [facility] Duplex Lower Ext Vein Scan, Bilat (12.15.18 @ 14:01) >  IMPRESSION:     Acute, above the knee deep venous thrombosis extending from the right   common femoral vein through the popliteal vein and into the right   gastrocnemius vein.    < from: [facility] Pulmonary Ventilation/Perfusion Scan (12.15.18 @ 10:13) >    IMPRESSION: Very low probability of pulmonary embolus.

## 2018-12-19 NOTE — PROGRESS NOTE ADULT - ASSESSMENT
68 year-old woman with Alzheimer's dementia seen to have volume overload and biventricular congestive heart failure.  TTE on this admission shows reduced LVEF, elevated pulmonary pressures.    Given severely reduced LVEF, plan to continue ACEi and uptitrate as blood pressure permits.  Will defer starting beta-blocker in this patient as she has baseline restrictive airway disease and is beta-agonist inhalers. Can reconsider low dose carvedilol as outpatient.    Patient with recurrent fevers - follow-up ID recommendations for antibiotics (currently being treated for MDR pseudomonas).  RLE DVT could be etiology of fevers.    Continue anticoagulation with apixaban 10mg BID for seven days followed by 5mg BID thereafter.    ID and orthopedic surgery consults noted.    Discharge planning per primary team.

## 2018-12-20 DIAGNOSIS — I82.409 ACUTE EMBOLISM AND THROMBOSIS OF UNSPECIFIED DEEP VEINS OF UNSPECIFIED LOWER EXTREMITY: ICD-10-CM

## 2018-12-20 DIAGNOSIS — R50.9 FEVER, UNSPECIFIED: ICD-10-CM

## 2018-12-20 LAB
ANION GAP SERPL CALC-SCNC: 15 MMOL/L — SIGNIFICANT CHANGE UP (ref 5–17)
ANION GAP SERPL CALC-SCNC: 17 MMOL/L — SIGNIFICANT CHANGE UP (ref 5–17)
APPEARANCE UR: ABNORMAL
APTT BLD: 31 SEC — SIGNIFICANT CHANGE UP (ref 27.5–36.3)
BILIRUB UR-MCNC: NEGATIVE — SIGNIFICANT CHANGE UP
BUN SERPL-MCNC: 89 MG/DL — HIGH (ref 7–23)
BUN SERPL-MCNC: 92 MG/DL — HIGH (ref 7–23)
CALCIUM SERPL-MCNC: 9 MG/DL — SIGNIFICANT CHANGE UP (ref 8.4–10.5)
CALCIUM SERPL-MCNC: 9.4 MG/DL — SIGNIFICANT CHANGE UP (ref 8.4–10.5)
CHLORIDE SERPL-SCNC: 101 MMOL/L — SIGNIFICANT CHANGE UP (ref 96–108)
CHLORIDE SERPL-SCNC: 99 MMOL/L — SIGNIFICANT CHANGE UP (ref 96–108)
CO2 SERPL-SCNC: 23 MMOL/L — SIGNIFICANT CHANGE UP (ref 22–31)
CO2 SERPL-SCNC: 24 MMOL/L — SIGNIFICANT CHANGE UP (ref 22–31)
COLOR SPEC: YELLOW — SIGNIFICANT CHANGE UP
CREAT SERPL-MCNC: 0.81 MG/DL — SIGNIFICANT CHANGE UP (ref 0.5–1.3)
CREAT SERPL-MCNC: 0.81 MG/DL — SIGNIFICANT CHANGE UP (ref 0.5–1.3)
DIFF PNL FLD: ABNORMAL
GAS PNL BLDV: SIGNIFICANT CHANGE UP
GLUCOSE BLDC GLUCOMTR-MCNC: 145 MG/DL — HIGH (ref 70–99)
GLUCOSE BLDC GLUCOMTR-MCNC: 149 MG/DL — HIGH (ref 70–99)
GLUCOSE BLDC GLUCOMTR-MCNC: 163 MG/DL — HIGH (ref 70–99)
GLUCOSE BLDC GLUCOMTR-MCNC: 178 MG/DL — HIGH (ref 70–99)
GLUCOSE BLDC GLUCOMTR-MCNC: 196 MG/DL — HIGH (ref 70–99)
GLUCOSE BLDC GLUCOMTR-MCNC: 214 MG/DL — HIGH (ref 70–99)
GLUCOSE SERPL-MCNC: 139 MG/DL — HIGH (ref 70–99)
GLUCOSE SERPL-MCNC: 142 MG/DL — HIGH (ref 70–99)
GLUCOSE UR QL: NEGATIVE — SIGNIFICANT CHANGE UP
HCT VFR BLD CALC: 29.4 % — LOW (ref 34.5–45)
HCT VFR BLD CALC: 31.3 % — LOW (ref 34.5–45)
HGB BLD-MCNC: 10 G/DL — LOW (ref 11.5–15.5)
HGB BLD-MCNC: 10.5 G/DL — LOW (ref 11.5–15.5)
INR BLD: 1.38 RATIO — HIGH (ref 0.88–1.16)
KETONES UR-MCNC: NEGATIVE — SIGNIFICANT CHANGE UP
LACTATE SERPL-SCNC: 1.4 MMOL/L — SIGNIFICANT CHANGE UP (ref 0.7–2)
LEUKOCYTE ESTERASE UR-ACNC: ABNORMAL
MCHC RBC-ENTMCNC: 31 PG — SIGNIFICANT CHANGE UP (ref 27–34)
MCHC RBC-ENTMCNC: 31.4 PG — SIGNIFICANT CHANGE UP (ref 27–34)
MCHC RBC-ENTMCNC: 33.5 GM/DL — SIGNIFICANT CHANGE UP (ref 32–36)
MCHC RBC-ENTMCNC: 34 GM/DL — SIGNIFICANT CHANGE UP (ref 32–36)
MCV RBC AUTO: 92.4 FL — SIGNIFICANT CHANGE UP (ref 80–100)
MCV RBC AUTO: 92.4 FL — SIGNIFICANT CHANGE UP (ref 80–100)
NITRITE UR-MCNC: NEGATIVE — SIGNIFICANT CHANGE UP
PH UR: 6 — SIGNIFICANT CHANGE UP (ref 5–8)
PLATELET # BLD AUTO: 349 K/UL — SIGNIFICANT CHANGE UP (ref 150–400)
PLATELET # BLD AUTO: 361 K/UL — SIGNIFICANT CHANGE UP (ref 150–400)
POTASSIUM SERPL-MCNC: 4.2 MMOL/L — SIGNIFICANT CHANGE UP (ref 3.5–5.3)
POTASSIUM SERPL-MCNC: 4.4 MMOL/L — SIGNIFICANT CHANGE UP (ref 3.5–5.3)
POTASSIUM SERPL-SCNC: 4.2 MMOL/L — SIGNIFICANT CHANGE UP (ref 3.5–5.3)
POTASSIUM SERPL-SCNC: 4.4 MMOL/L — SIGNIFICANT CHANGE UP (ref 3.5–5.3)
PROT UR-MCNC: ABNORMAL
PROTHROM AB SERPL-ACNC: 15.9 SEC — HIGH (ref 10–12.9)
RBC # BLD: 3.18 M/UL — LOW (ref 3.8–5.2)
RBC # BLD: 3.39 M/UL — LOW (ref 3.8–5.2)
RBC # FLD: 14.3 % — SIGNIFICANT CHANGE UP (ref 10.3–14.5)
RBC # FLD: 14.3 % — SIGNIFICANT CHANGE UP (ref 10.3–14.5)
SODIUM SERPL-SCNC: 139 MMOL/L — SIGNIFICANT CHANGE UP (ref 135–145)
SODIUM SERPL-SCNC: 140 MMOL/L — SIGNIFICANT CHANGE UP (ref 135–145)
SP GR SPEC: 1.02 — SIGNIFICANT CHANGE UP (ref 1.01–1.02)
UROBILINOGEN FLD QL: NEGATIVE — SIGNIFICANT CHANGE UP
WBC # BLD: 14.3 K/UL — HIGH (ref 3.8–10.5)
WBC # BLD: 14.8 K/UL — HIGH (ref 3.8–10.5)
WBC # FLD AUTO: 14.3 K/UL — HIGH (ref 3.8–10.5)
WBC # FLD AUTO: 14.8 K/UL — HIGH (ref 3.8–10.5)

## 2018-12-20 PROCEDURE — 99291 CRITICAL CARE FIRST HOUR: CPT

## 2018-12-20 PROCEDURE — 71045 X-RAY EXAM CHEST 1 VIEW: CPT | Mod: 26

## 2018-12-20 PROCEDURE — 99223 1ST HOSP IP/OBS HIGH 75: CPT | Mod: GC

## 2018-12-20 PROCEDURE — 99233 SBSQ HOSP IP/OBS HIGH 50: CPT

## 2018-12-20 PROCEDURE — 51703 INSERT BLADDER CATH COMPLEX: CPT

## 2018-12-20 RX ORDER — CEFTOLOZANE AND TAZOBACTAM 1; .5 G/10ML; G/10ML
1.5 INJECTION, POWDER, LYOPHILIZED, FOR SOLUTION INTRAVENOUS ONCE
Qty: 0 | Refills: 0 | Status: COMPLETED | OUTPATIENT
Start: 2018-12-20 | End: 2018-12-20

## 2018-12-20 RX ORDER — SODIUM CHLORIDE 9 MG/ML
250 INJECTION INTRAMUSCULAR; INTRAVENOUS; SUBCUTANEOUS ONCE
Qty: 0 | Refills: 0 | Status: COMPLETED | OUTPATIENT
Start: 2018-12-20 | End: 2018-12-24

## 2018-12-20 RX ORDER — SODIUM CHLORIDE 9 MG/ML
250 INJECTION INTRAMUSCULAR; INTRAVENOUS; SUBCUTANEOUS ONCE
Qty: 0 | Refills: 0 | Status: COMPLETED | OUTPATIENT
Start: 2018-12-20 | End: 2018-12-20

## 2018-12-20 RX ORDER — SODIUM CHLORIDE 9 MG/ML
250 INJECTION INTRAMUSCULAR; INTRAVENOUS; SUBCUTANEOUS ONCE
Qty: 0 | Refills: 0 | Status: DISCONTINUED | OUTPATIENT
Start: 2018-12-20 | End: 2018-12-20

## 2018-12-20 RX ADMIN — Medication 1: at 07:30

## 2018-12-20 RX ADMIN — Medication 1 APPLICATION(S): at 14:38

## 2018-12-20 RX ADMIN — APIXABAN 10 MILLIGRAM(S): 2.5 TABLET, FILM COATED ORAL at 17:48

## 2018-12-20 RX ADMIN — Medication 680 MILLIGRAM(S): at 16:06

## 2018-12-20 RX ADMIN — Medication 1 APPLICATION(S): at 06:17

## 2018-12-20 RX ADMIN — Medication 1 APPLICATION(S): at 17:22

## 2018-12-20 RX ADMIN — GABAPENTIN 300 MILLIGRAM(S): 400 CAPSULE ORAL at 18:47

## 2018-12-20 RX ADMIN — Medication 680 MILLIGRAM(S): at 17:50

## 2018-12-20 RX ADMIN — Medication 1 APPLICATION(S): at 23:19

## 2018-12-20 RX ADMIN — APIXABAN 10 MILLIGRAM(S): 2.5 TABLET, FILM COATED ORAL at 06:15

## 2018-12-20 RX ADMIN — QUETIAPINE FUMARATE 25 MILLIGRAM(S): 200 TABLET, FILM COATED ORAL at 22:59

## 2018-12-20 RX ADMIN — Medication 680 MILLIGRAM(S): at 11:19

## 2018-12-20 RX ADMIN — MIRTAZAPINE 7.5 MILLIGRAM(S): 45 TABLET, ORALLY DISINTEGRATING ORAL at 22:59

## 2018-12-20 RX ADMIN — SODIUM CHLORIDE 500 MILLILITER(S): 9 INJECTION INTRAMUSCULAR; INTRAVENOUS; SUBCUTANEOUS at 06:04

## 2018-12-20 RX ADMIN — Medication 3 MILLILITER(S): at 17:40

## 2018-12-20 RX ADMIN — SODIUM CHLORIDE 500 MILLILITER(S): 9 INJECTION INTRAMUSCULAR; INTRAVENOUS; SUBCUTANEOUS at 06:05

## 2018-12-20 RX ADMIN — CEFTOLOZANE AND TAZOBACTAM 100 GRAM(S): 1; .5 INJECTION, POWDER, LYOPHILIZED, FOR SOLUTION INTRAVENOUS at 03:59

## 2018-12-20 RX ADMIN — Medication 0.5 MILLIGRAM(S): at 23:00

## 2018-12-20 RX ADMIN — BUDESONIDE AND FORMOTEROL FUMARATE DIHYDRATE 2 PUFF(S): 160; 4.5 AEROSOL RESPIRATORY (INHALATION) at 17:40

## 2018-12-20 RX ADMIN — Medication 40 MILLIGRAM(S): at 06:15

## 2018-12-20 RX ADMIN — Medication 300 MILLIGRAM(S): at 11:17

## 2018-12-20 RX ADMIN — TIZANIDINE 4 MILLIGRAM(S): 4 TABLET ORAL at 21:12

## 2018-12-20 RX ADMIN — Medication 2: at 12:33

## 2018-12-20 RX ADMIN — MINOCYCLINE HYDROCHLORIDE 100 MILLIGRAM(S): 45 TABLET, EXTENDED RELEASE ORAL at 06:15

## 2018-12-20 RX ADMIN — Medication 3 MILLILITER(S): at 13:40

## 2018-12-20 RX ADMIN — FENTANYL CITRATE 1 PATCH: 50 INJECTION INTRAVENOUS at 07:15

## 2018-12-20 RX ADMIN — Medication 680 MILLIGRAM(S): at 00:41

## 2018-12-20 RX ADMIN — Medication 1: at 17:48

## 2018-12-20 RX ADMIN — PANTOPRAZOLE SODIUM 40 MILLIGRAM(S): 20 TABLET, DELAYED RELEASE ORAL at 06:17

## 2018-12-20 RX ADMIN — Medication 7.5 MILLIGRAM(S): at 09:34

## 2018-12-20 RX ADMIN — MINOCYCLINE HYDROCHLORIDE 100 MILLIGRAM(S): 45 TABLET, EXTENDED RELEASE ORAL at 17:48

## 2018-12-20 RX ADMIN — TIOTROPIUM BROMIDE 1 CAPSULE(S): 18 CAPSULE ORAL; RESPIRATORY (INHALATION) at 12:34

## 2018-12-20 RX ADMIN — LISINOPRIL 5 MILLIGRAM(S): 2.5 TABLET ORAL at 06:15

## 2018-12-20 RX ADMIN — BUDESONIDE AND FORMOTEROL FUMARATE DIHYDRATE 2 PUFF(S): 160; 4.5 AEROSOL RESPIRATORY (INHALATION) at 06:17

## 2018-12-20 RX ADMIN — TIZANIDINE 4 MILLIGRAM(S): 4 TABLET ORAL at 09:34

## 2018-12-20 RX ADMIN — GABAPENTIN 300 MILLIGRAM(S): 400 CAPSULE ORAL at 06:16

## 2018-12-20 RX ADMIN — Medication 3 MILLILITER(S): at 06:05

## 2018-12-20 RX ADMIN — SERTRALINE 50 MILLIGRAM(S): 25 TABLET, FILM COATED ORAL at 11:17

## 2018-12-20 RX ADMIN — Medication 1: at 01:01

## 2018-12-20 NOTE — PROCEDURE NOTE - ADDITIONAL PROCEDURE DETAILS
Called for difficult gavin exchange. Pt contracted. With assistance provided by BRET Hendricks pts 16f gavin was easily and aseptically replaced.   Primary team may replace in one month

## 2018-12-20 NOTE — PROVIDER CONTACT NOTE (OTHER) - BACKGROUND
Patient admitted with heart failure. UA and Urine Cultures order due to patient spiking fevers and hypotension.

## 2018-12-20 NOTE — CHART NOTE - NSCHARTNOTEFT_GEN_A_CORE
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 TTE on 12/3/2018 showing severe global LV systolic dysfunction, EF 26%, moderate to severe MR, moderate AR. Course has been complicated by fever with urine culture from 12/14 growing carbapenem resistant Pseudomonas and she was started on Zerbaxa on 12/9 and finished a 7 day course (last day 12/16). Course was further complicated by acute DVT of RLL and patient was started on Apixaban. Patient was also noted to have serosanguinous drainage from previous hip incision site, ortho was consulted and no acute orthopedic intervention was deemed indicated.    Earlier in the night, she had a temp of 102. She had a chest XR that showed vascular congestion, but no focal consolidations. Cx were obtained, and UA showed continued UTI. She was re-started on Zerbaxa.    RRT was called several hours later for hypotension.    Upon arrival, patient's BP was 74/30. Rectal temp was 100.2 (pt had received Tylenol 6 hours prior). The patient had her son-in-law and daughter at bedside, who noted her MS has been unchanged for the past few days. She was A&Ox1, responsive to some commands. The patient denied SOB, but had rales bilaterally at the bases. She was given a 500cc bolus, in addition to the 500cc bolus given prior. Levophed was drawn, but was not used, as the BP improved to 93/51. MICU was consulted. Primary team was at bedside and was told to follow up MICU recs, monitor for dyspnea from fluid overload, as well as follow up BPs Q15 minutes until full MICU evaluation. Family was at bedside and the plan was explained.    Aryles Hedjar, PGY-3  MAR, [phone]  Department of Internal Medicine

## 2018-12-20 NOTE — PROGRESS NOTE ADULT - ASSESSMENT
Assessment:  Advanced dementia, right thr mrsa infection s/p leisa and spacer on suppressive minocycline, recurrent aspiration events, chronic gavin completed  tx for cre pseudomonas  The patient has completed a full course of Zerbaxa   repeat ucx was reported to growing  yeast which is a colonizing vince   she has been febrile throughout this hospital stay, even while on Ceftazoline-tazobactam  Yeast in urine is likely colonizing vince, her urine post gavin placement looks clear.  The differential of fever includes infection,malignancy as well as inflammatory fever.  Known DVT in leg.  No clear source of infection  Plan:   continue supportive care as per primary medical team   Await repeat blod cultures  My bias would be to support her medically and await results of blood cultures  If we felt obligated we could give a dose of vanco and amikacin along with micafungin but I believe we should keep her hydrated, try to optimize  nutrition, and avoid empiric antimicrobials  We could consider repeat CT imaging of A/P to look for occult abscess  Huntly guarded

## 2018-12-20 NOTE — PROVIDER CONTACT NOTE (OTHER) - ASSESSMENT
Patient lying in bed. Family at bedside. Family requesting that gavin be changed in morning due to mother being uncomfortable.

## 2018-12-20 NOTE — CONSULT NOTE ADULT - SUBJECTIVE AND OBJECTIVE BOX
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 TTE on 12/3/2018 showing severe global LV systolic dysfunction, EF 26%, moderate to severe MR, moderate AR. Course is complicated by fever with urine culture from  growing carbapenem resistant Pseudomonas and she was started on Zerbaxa on  and finished a 7 day course (last day ). Course is further complicated by acute DVT of RLL and patient was started on Apixaban. Patient was also noted to have serosanguinous drainage from previous hip incision site, ortho was consulted and no acute orthopedic intervention was deemed indicated. Discharge planning was started by the primary team until RRT was called on  around 5 am for hypotension with BP 84/38. Patient received 2x250 cc NS bolus with BP 88/41, thus MICU was consulted for possible septic shock.     Patient was seen with the in the RRT with family and MICU attending at the bedside. Patient is currently receiving another 250 cc NS bolus with improvement of BP to 93/51. Blood cultures sent. Unable to assess review of system.     PAST MEDICAL & SURGICAL HISTORY:  CVA (cerebral vascular accident)  Fatty pancreas  PNA (pneumonia)  Pulmonary HTN  IGT (impaired glucose tolerance)  Ulcerative colitis  Acid reflux  Anxiety  Depression  Mouth sores  HLD (hyperlipidemia)  Asthma  Humeral head fracture  H/O: hysterectomy  H/O cataract extraction, left  History of knee replacement              FAMILY HISTORY:  Family history of dementia (Grandparent)  Family history of colon cancer (Grandparent)      SOCIAL HISTORY:  Tobacco use:  Alcohol:  Other drug use:      REVIEW OF SYSTEMS:  Constitutional: No fever, or chills. No recent weight loss or weight gain.   HEENT: No dry eyes or eye irritation. No postnasal drip or nasal congestion.  CV: No chest pain, or palpitations. No orthopnea.   Resp: No cough, or sputum production. No shortness of breath or dyspnea on exertion.   GI: No nausea or vomiting. No diarrhea or constipation. No abdominal pain.   : No dysuria, no nocturia or increased urinary frequency.  Musculoskeletal: No back pain, no myalgias  Skin: No rash or itchiness.   Neurological: No headache or dizziness. No syncope, no weakness, numbness.  Psychiatric: Denies depressed mood.   Endocrine: No cold or heat intolerance. No dry skin.  Hematologic/Lymphatic: No anemia or bleeding problem.       ASA; dye contrast (Anaphylaxis)  aspirin (Short breath)  divalproex sodium (Other (Unknown))  Haldol (Other (Unknown))  penicillin (Short breath; Rash)  sulfa drugs (Short breath; Rash)  vancomycin (Rash; Urticaria; Hives)  Xanax (Other (Unknown))    Home Medications:     MEDICATIONS  (STANDING):  ALBUTerol/ipratropium for Nebulization 3 milliLiter(s) Nebulizer every 6 hours  apixaban 10 milliGRAM(s) Oral every 12 hours  AQUAPHOR (petrolatum Ointment) 1 Application(s) Topical three times a day  buDESOnide 160 MICROgram(s)/formoterol 4.5 MICROgram(s) Inhaler 2 Puff(s) Inhalation two times a day  ceftolozane/tazobactam IVPB 1.5 Gram(s) IV Intermittent once  clobetasol 0.05% Ointment 1 Application(s) Topical two times a day  clonazePAM Tablet 0.5 milliGRAM(s) Oral at bedtime  dextrose 5%. 1000 milliLiter(s) (50 mL/Hr) IV Continuous <Continuous>  dextrose 50% Injectable 12.5 Gram(s) IV Push once  dextrose 50% Injectable 25 Gram(s) IV Push once  dextrose 50% Injectable 25 Gram(s) IV Push once  fentaNYL   Patch  12 MICROgram(s)/Hr 1 Patch Transdermal every 72 hours  ferrous    sulfate Liquid 300 milliGRAM(s) Enteral Tube daily  furosemide    Tablet 40 milliGRAM(s) Oral daily  gabapentin   Solution 300 milliGRAM(s) Oral two times a day  insulin lispro (HumaLOG) corrective regimen sliding scale   SubCutaneous every 6 hours  lisinopril 5 milliGRAM(s) Oral daily  medical marijuana oil 1 milliLiter(s),medical marijuana oil 1 milliLiter(s) 1 milliLiter(s) SubLingual <User Schedule>  minocycline 100 milliGRAM(s) Oral two times a day  mirtazapine 7.5 milliGRAM(s) Oral at bedtime  pantoprazole   Suspension 40 milliGRAM(s) Oral before breakfast  predniSONE   Tablet 7.5 milliGRAM(s) Oral <User Schedule>  QUEtiapine 25 milliGRAM(s) Oral at bedtime  sertraline 50 milliGRAM(s) Oral daily  sodium chloride 0.9% Bolus 250 milliLiter(s) IV Bolus once  sodium chloride 0.9% Bolus 250 milliLiter(s) IV Bolus once  tiotropium 18 MICROgram(s) Capsule 1 Capsule(s) Inhalation daily  tiZANidine 4 milliGRAM(s) Oral <User Schedule>    MEDICATIONS  (PRN):  acetaminophen    Suspension .. 680 milliGRAM(s) Oral every 6 hours PRN Temp greater or equal to 38C (100.4F), Mild Pain (1 - 3)  dextrose 40% Gel 15 Gram(s) Oral once PRN Blood Glucose LESS THAN 70 milliGRAM(s)/deciliter  glucagon  Injectable 1 milliGRAM(s) IntraMuscular once PRN Glucose LESS THAN 70 milligrams/deciliter  nystatin Powder 1 Application(s) Topical two times a day PRN rash/itchiness        OBJECTIVE:     Vital Signs Last 24 Hrs  T(C): 37.6 (20 Dec 2018 02:05), Max: 38.9 (19 Dec 2018 20:28)  T(F): 99.7 (20 Dec 2018 02:05), Max: 102.1 (19 Dec 2018 20:28)  HR: 90 (20 Dec 2018 04:01) (88 - 106)  BP: 88/41 (20 Dec 2018 04:01) (84/38 - 123/74)  BP(mean): --  RR: 17 (20 Dec 2018 04:01) (17 - 18)  SpO2: 94% (20 Dec 2018 04:01) (93% - 95%)    PHYSICAL EXAM:  General: Alert and cooperative. Not in acute stress. Well developed, well nourished.   Head: Normocephalic, no mass and lesions.  Eyes: Intact visual fields. PERRLA, clear conjunctiva. EOMI, no ptosis.   Throat: Oral cavity and pharynx normal. No inflammation, swelling, exudate, or lesions. Teeth and gingiva in good general condition.  Neck: No lymphadenopathy, no masses, no thyromegaly. Carotid pulses 2+. No JVD.   Respiratory: Bilateral lung clear to auscultation, no crackles, no wheezes, no rhonchi.   Cardiovascular: S1/S2 auscultated, no murmur, or gallop. Rhythm is regular. There is no peripheral edema, cyanosis or pallor. Extremities are warm and well perfused. Capillary refill is less than 2 seconds. No carotid bruits.  Abdomen: Soft, non-tender, nondistended, no guarding or rebound tenderness. Active bowel sounds in all 4 quadrants. No hepatosplenomegaly.   Musculoskeletal: Adequately aligned spine. ROM intact spine and extremities. No joint erythema or tenderness. Normal muscular development. Normal gait.   Extremities: No significant deformity or joint abnormality. Peripheral pulses intact. No varicosities. No peripheral edema, atrophy.   Skin: Intact, no rash. Normal color, texture and turgor with no lesions or eruptions.  Neurological: AOAx4. CN2-12 grosslly intact. Strength and sensation symmetric and intact throughout. Reflexes 2+ throughout. Cerebellar testing normal.  Psychiatry: The mental examination revealed the patient was oriented to person, place, and time. The patient was able to demonstrate good judgement and reason, without hallucinations, abnormal affect or abnormal behaviors during the examination. Patient is not suicidal.         I&O's Detail    18 Dec 2018 07:01  -  19 Dec 2018 07:00  --------------------------------------------------------  IN:    ns in tub fed vital15: 600 mL  Total IN: 600 mL    OUT:    Indwelling Catheter - Urethral: 1050 mL  Total OUT: 1050 mL    Total NET: -450 mL      19 Dec 2018 07:01  -  20 Dec 2018 05:37  --------------------------------------------------------  IN:  Total IN: 0 mL    OUT:    Indwelling Catheter - Urethral: 900 mL  Total OUT: 900 mL    Total NET: -900 mL          LABS:                        10.0   14.8  )-----------( 361      ( 20 Dec 2018 05:13 )             29.4     Hgb Trend: 10.0<--, 10.5<--, 10.5<--, 9.9<--, 10.1<--  12-20    139  |  101  |  92<H>  ----------------------------<  142<H>  4.4   |  23  |  0.81    Ca    9.0      20 Dec 2018 04:50  Phos  3.7     12-18  Mg     2.2     12-18      Creatinine Trend: 0.81<--, 0.81<--, 0.68<--, 0.75<--, 0.79<--, 0.86<--  PTT - ( 20 Dec 2018 05:12 )  PTT:31.0 sec  Urinalysis Basic - ( 20 Dec 2018 00:18 )    Color: Yellow / Appearance: Slightly Turbid / S.017 / pH: x  Gluc: x / Ketone: Negative  / Bili: Negative / Urobili: Negative   Blood: x / Protein: 30 mg/dL / Nitrite: Negative   Leuk Esterase: Large / RBC: 23 /hpf /  /hpf   Sq Epi: x / Non Sq Epi: 0 /hpf / Bacteria: Negative            MICROBIOLOGY:     RADIOLOGY:  [ ] Reviewed and interpreted by me    EKG: HPI:  Patient is a 68 year old woman with advanced dementia (nonverbal, dysphagia with PEG tube, bedbound, chronic gavin), sacral pressure ulcer stage 3, severe persistent asthma on daily Prednisone, 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 TTE on 12/3/2018 showing severe global LV systolic dysfunction, EF 26%, moderate to severe MR, moderate AR. Course is complicated by fever with urine culture from  growing carbapenem resistant Pseudomonas and she was started on Zerbaxa on  and finished a 7 day course (last day ). Course is further complicated by acute DVT of RLL and patient was started on Apixaban. Patient was also noted to have serosanguinous drainage from previous hip incision site, ortho was consulted and no acute orthopedic intervention was deemed indicated. Discharge planning was started by the primary team until RRT was called on  around 5 am for hypotension with BP 84/38. Patient received 2x250 cc NS bolus with BP 88/41, thus MICU was consulted for possible septic shock.     Patient was seen with the in the RRT with family and MICU attending at the bedside. Patient is currently receiving another 250 cc NS bolus with improvement of BP to 93/51. Blood cultures sent. Unable to assess review of system.     PAST MEDICAL & SURGICAL HISTORY:  CVA (cerebral vascular accident)  Fatty pancreas  PNA (pneumonia)  Pulmonary HTN  IGT (impaired glucose tolerance)  Ulcerative colitis  Acid reflux  Anxiety  Depression  Mouth sores  HLD (hyperlipidemia)  Asthma  Humeral head fracture  H/O: hysterectomy  H/O cataract extraction, left  History of knee replacement              FAMILY HISTORY:  Family history of dementia (Grandparent)  Family history of colon cancer (Grandparent)      SOCIAL HISTORY:  Tobacco use: none  Alcohol: none  Other drug use: none      REVIEW OF SYSTEMS:  Patient is nonverbal, unable to assess review of system    ASA; dye contrast (Anaphylaxis)  aspirin (Short breath)  divalproex sodium (Other (Unknown))  Haldol (Other (Unknown))  penicillin (Short breath; Rash)  sulfa drugs (Short breath; Rash)  vancomycin (Rash; Urticaria; Hives)  Xanax (Other (Unknown))    Home Medications:     MEDICATIONS  (STANDING):  ALBUTerol/ipratropium for Nebulization 3 milliLiter(s) Nebulizer every 6 hours  apixaban 10 milliGRAM(s) Oral every 12 hours  AQUAPHOR (petrolatum Ointment) 1 Application(s) Topical three times a day  buDESOnide 160 MICROgram(s)/formoterol 4.5 MICROgram(s) Inhaler 2 Puff(s) Inhalation two times a day  ceftolozane/tazobactam IVPB 1.5 Gram(s) IV Intermittent once  clobetasol 0.05% Ointment 1 Application(s) Topical two times a day  clonazePAM Tablet 0.5 milliGRAM(s) Oral at bedtime  dextrose 5%. 1000 milliLiter(s) (50 mL/Hr) IV Continuous <Continuous>  dextrose 50% Injectable 12.5 Gram(s) IV Push once  dextrose 50% Injectable 25 Gram(s) IV Push once  dextrose 50% Injectable 25 Gram(s) IV Push once  fentaNYL   Patch  12 MICROgram(s)/Hr 1 Patch Transdermal every 72 hours  ferrous    sulfate Liquid 300 milliGRAM(s) Enteral Tube daily  furosemide    Tablet 40 milliGRAM(s) Oral daily  gabapentin   Solution 300 milliGRAM(s) Oral two times a day  insulin lispro (HumaLOG) corrective regimen sliding scale   SubCutaneous every 6 hours  lisinopril 5 milliGRAM(s) Oral daily  medical marijuana oil 1 milliLiter(s),medical marijuana oil 1 milliLiter(s) 1 milliLiter(s) SubLingual <User Schedule>  minocycline 100 milliGRAM(s) Oral two times a day  mirtazapine 7.5 milliGRAM(s) Oral at bedtime  pantoprazole   Suspension 40 milliGRAM(s) Oral before breakfast  predniSONE   Tablet 7.5 milliGRAM(s) Oral <User Schedule>  QUEtiapine 25 milliGRAM(s) Oral at bedtime  sertraline 50 milliGRAM(s) Oral daily  sodium chloride 0.9% Bolus 250 milliLiter(s) IV Bolus once  sodium chloride 0.9% Bolus 250 milliLiter(s) IV Bolus once  tiotropium 18 MICROgram(s) Capsule 1 Capsule(s) Inhalation daily  tiZANidine 4 milliGRAM(s) Oral <User Schedule>    MEDICATIONS  (PRN):  acetaminophen    Suspension .. 680 milliGRAM(s) Oral every 6 hours PRN Temp greater or equal to 38C (100.4F), Mild Pain (1 - 3)  dextrose 40% Gel 15 Gram(s) Oral once PRN Blood Glucose LESS THAN 70 milliGRAM(s)/deciliter  glucagon  Injectable 1 milliGRAM(s) IntraMuscular once PRN Glucose LESS THAN 70 milligrams/deciliter  nystatin Powder 1 Application(s) Topical two times a day PRN rash/itchiness        OBJECTIVE:     Vital Signs Last 24 Hrs  T(C): 37.6 (20 Dec 2018 02:05), Max: 38.9 (19 Dec 2018 20:28)  T(F): 99.7 (20 Dec 2018 02:05), Max: 102.1 (19 Dec 2018 20:28)  HR: 90 (20 Dec 2018 04:01) (88 - 106)  BP: 88/41 (20 Dec 2018 04:01) (84/38 - 123/74)  BP(mean): --  RR: 17 (20 Dec 2018 04:01) (17 - 18)  SpO2: 94% (20 Dec 2018 04:01) (93% - 95%)    PHYSICAL EXAM:  General: Not in acute stress. Alert.   Head: Normocephalic   Eyes:   PERRLA, clear conjunctiva. EOMI, no ptosis.    Respiratory: Bilateral lung with crackles at bases  Cardiovascular: S1/S2 auscultated, no murmur, or gallop. Rhythm is regular. There is no peripheral edema   Abdomen: Soft, non-tender, nondistended, no guarding or rebound tenderness. Active bowel sounds in all 4 quadrant  : Gavin in place  Extremities: Contracted extremities No peripheral edema  Skin: Intact, no rash. Normal color, texture and turgor with no lesions or eruptions.  Neurological: AOAx0.         I&O's Detail    18 Dec 2018 07:01  -  19 Dec 2018 07:00  --------------------------------------------------------  IN:    ns in tub fed vital15: 600 mL  Total IN: 600 mL    OUT:    Indwelling Catheter - Urethral: 1050 mL  Total OUT: 1050 mL    Total NET: -450 mL      19 Dec 2018 07:01  -  20 Dec 2018 05:37  --------------------------------------------------------  IN:  Total IN: 0 mL    OUT:    Indwelling Catheter - Urethral: 900 mL  Total OUT: 900 mL    Total NET: -900 mL          LABS:                        10.0   14.8  )-----------( 361      ( 20 Dec 2018 05:13 )             29.4     Hgb Trend: 10.0<--, 10.5<--, 10.5<--, 9.9<--, 10.1<--  12-20    139  |  101  |  92<H>  ----------------------------<  142<H>  4.4   |  23  |  0.81    Ca    9.0      20 Dec 2018 04:50  Phos  3.7     12-18  Mg     2.2     12-18      Creatinine Trend: 0.81<--, 0.81<--, 0.68<--, 0.75<--, 0.79<--, 0.86<--  PTT - ( 20 Dec 2018 05:12 )  PTT:31.0 sec  Urinalysis Basic - ( 20 Dec 2018 00:18 )    Color: Yellow / Appearance: Slightly Turbid / S.017 / pH: x  Gluc: x / Ketone: Negative  / Bili: Negative / Urobili: Negative   Blood: x / Protein: 30 mg/dL / Nitrite: Negative   Leuk Esterase: Large / RBC: 23 /hpf /  /hpf   Sq Epi: x / Non Sq Epi: 0 /hpf / Bacteria: Negative        RADIOLOGY:      < from: Transthoracic Echocardiogram (18 @ 11:23) >  1. Mitral annular calcification, otherwise normal mitral  valve. Moderate-severe mitral regurgitation (vena contracta  0.8 cm)  2. Calcified trileafletaortic valve with normal opening.  Peak transaortic valve gradient equals 3 mm Hg, mean  transaortic valve gradient equals 1 mm Hg, aortic valve  velocity time integral equals 13 cm. Moderate aortic  regurgitation  3. Moderately dilated left atrium.LA volume index = 45  cc/m2.  4. Severe global left ventricular systolic dysfunction.  5. Increased E/e'  is consistent with elevated left  ventricular filling pressure.  6. Moderate right atrial enlargement. Inferior vena cava is  dilated (>=2.5cm) with normal respiratory variability  consistent with right atrial pressure 16-20mm Hg.  7. Right ventricular enlargement with decreased right  ventricular systolic function.  8. Normal tricuspid valve. Severe tricuspid regurgitation.  9. Estimated pulmonary artery systolic pressure equals 82  mm Hg, assuming right atrial pressure equals 8 mm Hg,  consistent with severe pulmonary pressures.  10. 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 TTE on 12/3/2018 showing severe global LV systolic dysfunction, EF 26%, moderate to severe MR, moderate AR. Course is complicated by fever with urine culture from  growing carbapenem resistant Pseudomonas and she was started on Zerbaxa on  and finished a 7 day course (last day ). Course is further complicated by acute DVT of RLL and patient was started on Apixaban. Patient was also noted to have serosanguinous drainage from previous hip incision site, ortho was consulted and no acute orthopedic intervention was deemed indicated. Discharge planning was started by the primary team until RRT was called on  around 5 am for hypotension with BP 84/38. Patient received 2x250 cc NS bolus with BP 88/41, thus MICU was consulted for possible septic shock.     Patient was seen with the in the RRT with family and MICU attending at the bedside. Patient is currently receiving another 250 cc NS bolus with improvement of BP to 93/51. Blood cultures sent. Unable to assess review of system.     PAST MEDICAL & SURGICAL HISTORY:  CVA (cerebral vascular accident)  Fatty pancreas  PNA (pneumonia)  Pulmonary HTN  IGT (impaired glucose tolerance)  Ulcerative colitis  Acid reflux  Anxiety  Depression  Mouth sores  HLD (hyperlipidemia)  Asthma  Humeral head fracture  H/O: hysterectomy  H/O cataract extraction, left  History of knee replacement    FAMILY HISTORY:  Family history of dementia (Grandparent)  Family history of colon cancer (Grandparent)      SOCIAL HISTORY:  Tobacco use: none  Alcohol: none  Other drug use: none      REVIEW OF SYSTEMS:  Patient is nonverbal, unable to assess review of system    ASA; dye contrast (Anaphylaxis)  aspirin (Short breath)  divalproex sodium (Other (Unknown))  Haldol (Other (Unknown))  penicillin (Short breath; Rash)  sulfa drugs (Short breath; Rash)  vancomycin (Rash; Urticaria; Hives)  Xanax (Other (Unknown))    Home Medications:     MEDICATIONS  (STANDING):  ALBUTerol/ipratropium for Nebulization 3 milliLiter(s) Nebulizer every 6 hours  apixaban 10 milliGRAM(s) Oral every 12 hours  AQUAPHOR (petrolatum Ointment) 1 Application(s) Topical three times a day  buDESOnide 160 MICROgram(s)/formoterol 4.5 MICROgram(s) Inhaler 2 Puff(s) Inhalation two times a day  ceftolozane/tazobactam IVPB 1.5 Gram(s) IV Intermittent once  clobetasol 0.05% Ointment 1 Application(s) Topical two times a day  clonazePAM Tablet 0.5 milliGRAM(s) Oral at bedtime  dextrose 5%. 1000 milliLiter(s) (50 mL/Hr) IV Continuous <Continuous>  dextrose 50% Injectable 12.5 Gram(s) IV Push once  dextrose 50% Injectable 25 Gram(s) IV Push once  dextrose 50% Injectable 25 Gram(s) IV Push once  fentaNYL   Patch  12 MICROgram(s)/Hr 1 Patch Transdermal every 72 hours  ferrous    sulfate Liquid 300 milliGRAM(s) Enteral Tube daily  furosemide    Tablet 40 milliGRAM(s) Oral daily  gabapentin   Solution 300 milliGRAM(s) Oral two times a day  insulin lispro (HumaLOG) corrective regimen sliding scale   SubCutaneous every 6 hours  lisinopril 5 milliGRAM(s) Oral daily  medical marijuana oil 1 milliLiter(s),medical marijuana oil 1 milliLiter(s) 1 milliLiter(s) SubLingual <User Schedule>  minocycline 100 milliGRAM(s) Oral two times a day  mirtazapine 7.5 milliGRAM(s) Oral at bedtime  pantoprazole   Suspension 40 milliGRAM(s) Oral before breakfast  predniSONE   Tablet 7.5 milliGRAM(s) Oral <User Schedule>  QUEtiapine 25 milliGRAM(s) Oral at bedtime  sertraline 50 milliGRAM(s) Oral daily  sodium chloride 0.9% Bolus 250 milliLiter(s) IV Bolus once  sodium chloride 0.9% Bolus 250 milliLiter(s) IV Bolus once  tiotropium 18 MICROgram(s) Capsule 1 Capsule(s) Inhalation daily  tiZANidine 4 milliGRAM(s) Oral <User Schedule>    MEDICATIONS  (PRN):  acetaminophen    Suspension .. 680 milliGRAM(s) Oral every 6 hours PRN Temp greater or equal to 38C (100.4F), Mild Pain (1 - 3)  dextrose 40% Gel 15 Gram(s) Oral once PRN Blood Glucose LESS THAN 70 milliGRAM(s)/deciliter  glucagon  Injectable 1 milliGRAM(s) IntraMuscular once PRN Glucose LESS THAN 70 milligrams/deciliter  nystatin Powder 1 Application(s) Topical two times a day PRN rash/itchiness        OBJECTIVE:     Vital Signs Last 24 Hrs  T(C): 37.6 (20 Dec 2018 02:05), Max: 38.9 (19 Dec 2018 20:28)  T(F): 99.7 (20 Dec 2018 02:05), Max: 102.1 (19 Dec 2018 20:28)  HR: 90 (20 Dec 2018 04:01) (88 - 106)  BP: 88/41 (20 Dec 2018 04:01) (84/38 - 123/74)  BP(mean): --  RR: 17 (20 Dec 2018 04:01) (17 - 18)  SpO2: 94% (20 Dec 2018 04:01) (93% - 95%)    PHYSICAL EXAM:  General: Not in acute stress. Alert.   Head: Normocephalic   Eyes:   PERRLA, clear conjunctiva. EOMI, no ptosis.    Respiratory: Bilateral lung with crackles at bases  Cardiovascular: S1/S2 auscultated, no murmur, or gallop. Rhythm is regular. There is no peripheral edema   Abdomen: Soft, non-tender, nondistended, no guarding or rebound tenderness. Active bowel sounds in all 4 quadrant  : Gavin in place  Extremities: Contracted extremities No peripheral edema  Skin: Intact, no rash. Normal color, texture and turgor with no lesions or eruptions.  Well healed incision along R hip with small area of drainage at R knee. no erythema/edema/purulence  Neurological: AOAx0.         I&O's Detail    18 Dec 2018 07:01  -  19 Dec 2018 07:00  --------------------------------------------------------  IN:    ns in tub fed vital15: 600 mL  Total IN: 600 mL    OUT:    Indwelling Catheter - Urethral: 1050 mL  Total OUT: 1050 mL    Total NET: -450 mL      19 Dec 2018 07:01  -  20 Dec 2018 05:37  --------------------------------------------------------  IN:  Total IN: 0 mL    OUT:    Indwelling Catheter - Urethral: 900 mL  Total OUT: 900 mL    Total NET: -900 mL          LABS:                        10.0   14.8  )-----------( 361      ( 20 Dec 2018 05:13 )             29.4     Hgb Trend: 10.0<--, 10.5<--, 10.5<--, 9.9<--, 10.1<--  12-20    139  |  101  |  92<H>  ----------------------------<  142<H>  4.4   |  23  |  0.81    Ca    9.0      20 Dec 2018 04:50  Phos  3.7     12-18  Mg     2.2     12-18      Creatinine Trend: 0.81<--, 0.81<--, 0.68<--, 0.75<--, 0.79<--, 0.86<--  PTT - ( 20 Dec 2018 05:12 )  PTT:31.0 sec  Urinalysis Basic - ( 20 Dec 2018 00:18 )    Color: Yellow / Appearance: Slightly Turbid / S.017 / pH: x  Gluc: x / Ketone: Negative  / Bili: Negative / Urobili: Negative   Blood: x / Protein: 30 mg/dL / Nitrite: Negative   Leuk Esterase: Large / RBC: 23 /hpf /  /hpf   Sq Epi: x / Non Sq Epi: 0 /hpf / Bacteria: Negative        RADIOLOGY:      < from: Transthoracic Echocardiogram (18 @ 11:23) >  1. Mitral annular calcification, otherwise normal mitral  valve. Moderate-severe mitral regurgitation (vena contracta  0.8 cm)  2. Calcified trileafletaortic valve with normal opening.  Peak transaortic valve gradient equals 3 mm Hg, mean  transaortic valve gradient equals 1 mm Hg, aortic valve  velocity time integral equals 13 cm. Moderate aortic  regurgitation  3. Moderately dilated left atrium.LA volume index = 45  cc/m2.  4. Severe global left ventricular systolic dysfunction.  5. Increased E/e'  is consistent with elevated left  ventricular filling pressure.  6. Moderate right atrial enlargement. Inferior vena cava is  dilated (>=2.5cm) with normal respiratory variability  consistent with right atrial pressure 16-20mm Hg.  7. Right ventricular enlargement with decreased right  ventricular systolic function.  8. Normal tricuspid valve. Severe tricuspid regurgitation.  9. Estimated pulmonary artery systolic pressure equals 82  mm Hg, assuming right atrial pressure equals 8 mm Hg,  consistent with severe pulmonary pressures.  10. Color Doppler demonstrates evidence of left to right  shunt    < end of copied text >

## 2018-12-20 NOTE — PROVIDER CONTACT NOTE (OTHER) - ASSESSMENT
Patient lying in bed. Hypotensive despite receiving 500 cc bolus'. Low grade fever. No change in baseline mentation.

## 2018-12-20 NOTE — CHART NOTE - NSCHARTNOTEFT_GEN_A_CORE
Nutrition Follow Up Note  Patient seen for: nutrition consult regarding tube feeds     Source: RN, medical record,  at bedside     Diet : NPO with enteral feeds      Enteral /Parenteral Nutrition: Pt receiving bolus feeds of Vital AF 1.2 @ 200ml q 4hs (5 feeds per day total). Current regimen provides 1000ml total volume, 1200kcal (26Kcal/Kg) and 75g (1.65g/Kg) protein and 811ml free water based on current dosing wt of 45.4lbs. Pt also receiving Banatrol Plus 2x per day given loose BMs.     Pt noted with large wt fluctuation this admission, question accuracy of weight trends.   11/13: 105 lbs   11/14: 95.2 lbs  Dosing wt 99.88 lbs (12/4)  12/3: 124lbs   12/4: 123lbs   12/5: 122bs   12/12: 91lbs  12/20: 91lbs     Pertinent Medications: MEDICATIONS  (STANDING):  ALBUTerol/ipratropium for Nebulization 3 milliLiter(s) Nebulizer every 6 hours  apixaban 10 milliGRAM(s) Oral every 12 hours  AQUAPHOR (petrolatum Ointment) 1 Application(s) Topical three times a day  buDESOnide 160 MICROgram(s)/formoterol 4.5 MICROgram(s) Inhaler 2 Puff(s) Inhalation two times a day  clobetasol 0.05% Ointment 1 Application(s) Topical two times a day  clonazePAM Tablet 0.5 milliGRAM(s) Oral at bedtime  dextrose 5%. 1000 milliLiter(s) (50 mL/Hr) IV Continuous <Continuous>  dextrose 50% Injectable 12.5 Gram(s) IV Push once  dextrose 50% Injectable 25 Gram(s) IV Push once  dextrose 50% Injectable 25 Gram(s) IV Push once  fentaNYL   Patch  12 MICROgram(s)/Hr 1 Patch Transdermal every 72 hours  ferrous    sulfate Liquid 300 milliGRAM(s) Enteral Tube daily  gabapentin   Solution 300 milliGRAM(s) Oral two times a day  insulin lispro (HumaLOG) corrective regimen sliding scale   SubCutaneous every 6 hours  medical marijuana oil 1 milliLiter(s),medical marijuana oil 1 milliLiter(s) 1 milliLiter(s) SubLingual <User Schedule>  minocycline 100 milliGRAM(s) Oral two times a day  mirtazapine 7.5 milliGRAM(s) Oral at bedtime  pantoprazole   Suspension 40 milliGRAM(s) Oral before breakfast  predniSONE   Tablet 7.5 milliGRAM(s) Oral <User Schedule>  QUEtiapine 25 milliGRAM(s) Oral at bedtime  sertraline 50 milliGRAM(s) Oral daily  tiotropium 18 MICROgram(s) Capsule 1 Capsule(s) Inhalation daily  tiZANidine 4 milliGRAM(s) Oral <User Schedule>    MEDICATIONS  (PRN):  acetaminophen    Suspension .. 680 milliGRAM(s) Oral every 6 hours PRN Temp greater or equal to 38C (100.4F), Mild Pain (1 - 3)  dextrose 40% Gel 15 Gram(s) Oral once PRN Blood Glucose LESS THAN 70 milliGRAM(s)/deciliter  glucagon  Injectable 1 milliGRAM(s) IntraMuscular once PRN Glucose LESS THAN 70 milligrams/deciliter  nystatin Powder 1 Application(s) Topical two times a day PRN rash/itchiness    Pertinent Labs: 12-20 @ 04:50: Na 139, BUN 92<H>, Cr 0.81, <H>, K+ 4.4, Phos --, Mg --, Alk Phos --, ALT/SGPT --, AST/SGOT --, HbA1c --  12-20 @ 02:19: Na 140, BUN 89<H>, Cr 0.81, <H>, K+ 4.2, Phos --, Mg --, Alk Phos --, ALT/SGPT --, AST/SGOT --, HbA1c --    Finger Sticks:  POCT Blood Glucose.: 196 mg/dL (12-20 @ 07:28)  POCT Blood Glucose.: 145 mg/dL (12-20 @ 04:29)  POCT Blood Glucose.: 178 mg/dL (12-20 @ 00:39)  POCT Blood Glucose.: 178 mg/dL (12-19 @ 17:52)  POCT Blood Glucose.: 202 mg/dL (12-19 @ 12:01)      Skin per nursing documentation: unstageable sacrum, stage 2 sacrum.  Edema: +1 BLE     Estimated Needs:   [ x] no change since previous assessment  [ ] recalculated:     Previous Nutrition Diagnosis: Increased nutrient needs  Nutrition Diagnosis is: on going     New Nutrition Diagnosis: N/A     Interventions:   Recommend  1) Recommend change TFs to continuous feeds. Recommend Vital 1.2 starting at 25ml/hr and advancing by 10ml/hr q4hrs to goal rate of 45ml/hr x 24 hrs. Regimen to provide 1080ml total volume, 1296Kcal (31.6Kcal/kg), 81g (2g/kg) and 857ml free water, based on current weight of 41 Kg  Pending discharge may switch back to bolus feeds of Vital 1.2 AF 200ml 5x per day (1000ml total volume, 1200kcal and 75g protein and 811ml free).  2) Continue Banatrol Plus 2 packets per day as needed in setting of loose/frequent stooling given report of frequent loose stools. Will monitor need to increase as needed.   3) Monitor tolerance to EN and adjust as needed.     Monitoring and Evaluation:     Continue to monitor Nutritional intake, Tolerance to diet prescription, weights, labs, skin integrity    RD remains available upon request and will follow up per protocol  Shasha Hare RD, CDN, Pager # [phone] Nutrition Follow Up Note  Patient seen for: nutrition consult regarding tube feeds     Chart reviewed, events noted. 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 Rapid response called for hypotension, pt remains with fever.  Has positive UA and therefore started on zerbaxa again.    Source: RN, medical record,  at bedside     Diet : NPO with enteral feeds      Enteral /Parenteral Nutrition: Pt receiving bolus feeds of Vital AF 1.2 @ 200ml q 4hs (5 feeds per day total). Current regimen provides 1000ml total volume, 1200kcal (26Kcal/Kg) and 75g (1.65g/Kg) protein and 811ml free water based on current dosing wt of 45.4lbs. Pt also receiving Banatrol Plus 2x per day given loose BMs.     Pt noted with large wt fluctuation this admission, question accuracy of weight trends.   11/13: 105 lbs   11/14: 95.2 lbs  Dosing wt 99.88 lbs (12/4)  12/3: 124lbs   12/4: 123lbs   12/5: 122bs   12/12: 91lbs  12/20: 91lbs     Pertinent Medications: MEDICATIONS  (STANDING):  ALBUTerol/ipratropium for Nebulization 3 milliLiter(s) Nebulizer every 6 hours  apixaban 10 milliGRAM(s) Oral every 12 hours  AQUAPHOR (petrolatum Ointment) 1 Application(s) Topical three times a day  buDESOnide 160 MICROgram(s)/formoterol 4.5 MICROgram(s) Inhaler 2 Puff(s) Inhalation two times a day  clobetasol 0.05% Ointment 1 Application(s) Topical two times a day  clonazePAM Tablet 0.5 milliGRAM(s) Oral at bedtime  dextrose 5%. 1000 milliLiter(s) (50 mL/Hr) IV Continuous <Continuous>  dextrose 50% Injectable 12.5 Gram(s) IV Push once  dextrose 50% Injectable 25 Gram(s) IV Push once  dextrose 50% Injectable 25 Gram(s) IV Push once  fentaNYL   Patch  12 MICROgram(s)/Hr 1 Patch Transdermal every 72 hours  ferrous    sulfate Liquid 300 milliGRAM(s) Enteral Tube daily  gabapentin   Solution 300 milliGRAM(s) Oral two times a day  insulin lispro (HumaLOG) corrective regimen sliding scale   SubCutaneous every 6 hours  medical marijuana oil 1 milliLiter(s),medical marijuana oil 1 milliLiter(s) 1 milliLiter(s) SubLingual <User Schedule>  minocycline 100 milliGRAM(s) Oral two times a day  mirtazapine 7.5 milliGRAM(s) Oral at bedtime  pantoprazole   Suspension 40 milliGRAM(s) Oral before breakfast  predniSONE   Tablet 7.5 milliGRAM(s) Oral <User Schedule>  QUEtiapine 25 milliGRAM(s) Oral at bedtime  sertraline 50 milliGRAM(s) Oral daily  tiotropium 18 MICROgram(s) Capsule 1 Capsule(s) Inhalation daily  tiZANidine 4 milliGRAM(s) Oral <User Schedule>    MEDICATIONS  (PRN):  acetaminophen    Suspension .. 680 milliGRAM(s) Oral every 6 hours PRN Temp greater or equal to 38C (100.4F), Mild Pain (1 - 3)  dextrose 40% Gel 15 Gram(s) Oral once PRN Blood Glucose LESS THAN 70 milliGRAM(s)/deciliter  glucagon  Injectable 1 milliGRAM(s) IntraMuscular once PRN Glucose LESS THAN 70 milligrams/deciliter  nystatin Powder 1 Application(s) Topical two times a day PRN rash/itchiness    Pertinent Labs: 12-20 @ 04:50: Na 139, BUN 92<H>, Cr 0.81, <H>, K+ 4.4, Phos --, Mg --, Alk Phos --, ALT/SGPT --, AST/SGOT --, HbA1c --  12-20 @ 02:19: Na 140, BUN 89<H>, Cr 0.81, <H>, K+ 4.2, Phos --, Mg --, Alk Phos --, ALT/SGPT --, AST/SGOT --, HbA1c --    Finger Sticks:  POCT Blood Glucose.: 196 mg/dL (12-20 @ 07:28)  POCT Blood Glucose.: 145 mg/dL (12-20 @ 04:29)  POCT Blood Glucose.: 178 mg/dL (12-20 @ 00:39)  POCT Blood Glucose.: 178 mg/dL (12-19 @ 17:52)  POCT Blood Glucose.: 202 mg/dL (12-19 @ 12:01)      Skin per nursing documentation: unstageable sacrum, stage 2 sacrum.  Edema: +1 BLE     Estimated Needs:   [ x] no change since previous assessment  [ ] recalculated:     Previous Nutrition Diagnosis: Increased nutrient needs  Nutrition Diagnosis is: on going     New Nutrition Diagnosis: N/A     Interventions:   Recommend  1) Recommend change tube feeds  to continuous feeds. Recommend Vital 1.2 starting at 25ml/hr and advancing by 10ml/hr q4hrs to goal rate of 45ml/hr x 24 hrs. Regimen to provide 1080ml total volume, 1296Kcal (31.6Kcal/kg), 81g (2g/kg) and 857ml free water, based on current weight of 41 Kg. Defer additional free water to team. Pending discharge may switch back to bolus feeds of Vital 1.2 AF 200ml 5x per day (1000ml total volume, 1200kcal (29 kcal/kg) and 75g protein (1.8g/Kg) and 811ml free water).  2) Continue Banatrol Plus 2 packets per day as needed in setting of loose/frequent stooling. Will monitor need to increase as needed.   3) Monitor tolerance to EN and adjust as needed.     Monitoring and Evaluation:     Continue to monitor Nutritional intake, Tolerance to diet prescription, weights, labs, skin integrity    RD remains available upon request and will follow up per protocol  Shasha Hare RD, CDN, Pager # [phone] Nutrition Follow Up Note  Patient seen for: nutrition consult regarding tube feeds     Chart reviewed, events noted. 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 Rapid response called for hypotension, pt remains with fever.  Has positive UA and therefore started on zerbaxa again.    Source: RN, medical record,  at bedside     Diet : NPO with enteral feeds      Enteral /Parenteral Nutrition: Pt receiving bolus feeds of Vital AF 1.2 @ 200ml q 4hs (5 feeds per day total). Current regimen provides 1000ml total volume, 1200kcal (26Kcal/Kg) and 75g (1.65g/Kg) protein and 811ml free water based on current dosing wt of 45.4lbs. Pt also receiving Banatrol Plus 2x per day given loose BMs.     Pt noted with large wt fluctuation this admission, question accuracy of weight trends.   11/13: 105 lbs   11/14: 95.2 lbs  Dosing wt 99.88 lbs (12/4)  12/3: 124lbs   12/4: 123lbs   12/5: 122bs   12/12: 91lbs  12/20: 91lbs     Pertinent Medications: MEDICATIONS  (STANDING):  ALBUTerol/ipratropium for Nebulization 3 milliLiter(s) Nebulizer every 6 hours  apixaban 10 milliGRAM(s) Oral every 12 hours  AQUAPHOR (petrolatum Ointment) 1 Application(s) Topical three times a day  buDESOnide 160 MICROgram(s)/formoterol 4.5 MICROgram(s) Inhaler 2 Puff(s) Inhalation two times a day  clobetasol 0.05% Ointment 1 Application(s) Topical two times a day  clonazePAM Tablet 0.5 milliGRAM(s) Oral at bedtime  dextrose 5%. 1000 milliLiter(s) (50 mL/Hr) IV Continuous <Continuous>  dextrose 50% Injectable 12.5 Gram(s) IV Push once  dextrose 50% Injectable 25 Gram(s) IV Push once  dextrose 50% Injectable 25 Gram(s) IV Push once  fentaNYL   Patch  12 MICROgram(s)/Hr 1 Patch Transdermal every 72 hours  ferrous    sulfate Liquid 300 milliGRAM(s) Enteral Tube daily  gabapentin   Solution 300 milliGRAM(s) Oral two times a day  insulin lispro (HumaLOG) corrective regimen sliding scale   SubCutaneous every 6 hours  medical marijuana oil 1 milliLiter(s),medical marijuana oil 1 milliLiter(s) 1 milliLiter(s) SubLingual <User Schedule>  minocycline 100 milliGRAM(s) Oral two times a day  mirtazapine 7.5 milliGRAM(s) Oral at bedtime  pantoprazole   Suspension 40 milliGRAM(s) Oral before breakfast  predniSONE   Tablet 7.5 milliGRAM(s) Oral <User Schedule>  QUEtiapine 25 milliGRAM(s) Oral at bedtime  sertraline 50 milliGRAM(s) Oral daily  tiotropium 18 MICROgram(s) Capsule 1 Capsule(s) Inhalation daily  tiZANidine 4 milliGRAM(s) Oral <User Schedule>    MEDICATIONS  (PRN):  acetaminophen    Suspension .. 680 milliGRAM(s) Oral every 6 hours PRN Temp greater or equal to 38C (100.4F), Mild Pain (1 - 3)  dextrose 40% Gel 15 Gram(s) Oral once PRN Blood Glucose LESS THAN 70 milliGRAM(s)/deciliter  glucagon  Injectable 1 milliGRAM(s) IntraMuscular once PRN Glucose LESS THAN 70 milligrams/deciliter  nystatin Powder 1 Application(s) Topical two times a day PRN rash/itchiness    Pertinent Labs: 12-20 @ 04:50: Na 139, BUN 92<H>, Cr 0.81, <H>, K+ 4.4, Phos --, Mg --, Alk Phos --, ALT/SGPT --, AST/SGOT --, HbA1c --  12-20 @ 02:19: Na 140, BUN 89<H>, Cr 0.81, <H>, K+ 4.2, Phos --, Mg --, Alk Phos --, ALT/SGPT --, AST/SGOT --, HbA1c --    Finger Sticks:  POCT Blood Glucose.: 196 mg/dL (12-20 @ 07:28)  POCT Blood Glucose.: 145 mg/dL (12-20 @ 04:29)  POCT Blood Glucose.: 178 mg/dL (12-20 @ 00:39)  POCT Blood Glucose.: 178 mg/dL (12-19 @ 17:52)  POCT Blood Glucose.: 202 mg/dL (12-19 @ 12:01)      Skin per nursing documentation: unstageable sacrum, stage 2 sacrum.  Edema: +1 BLE     Estimated Needs:   [ x] no change since previous assessment  [ ] recalculated:     Previous Nutrition Diagnosis: Increased nutrient needs  Nutrition Diagnosis is: on going     New Nutrition Diagnosis: N/A     Interventions:   Recommend  1) Recommend change tube feeds  to continuous feeds. Recommend Vital 1.2 starting at 25ml/hr and advancing by 10ml/hr q4hrs to goal rate of 45ml/hr x 24 hrs. Regimen to provide 1080ml total volume, 1296Kcal (31.6Kcal/kg), 81g (2g/kg) and 857ml free water, based on current weight of 41 Kg. Defer additional free water to team. Pending discharge may switch back to bolus feeds of Vital 1.2 AF 200ml 5x per day (1000ml total volume, 1200kcal (29 kcal/kg) and 75g protein (1.8g/Kg) and 811ml free water).  2) Continue Banatrol Plus 2 packets per day as needed in setting of loose/frequent stooling. Will monitor need to increase as needed.   3) Monitor tolerance to EN and adjust as needed.   *Discussed with NP    Monitoring and Evaluation:     Continue to monitor Nutritional intake, Tolerance to diet prescription, weights, labs, skin integrity    RD remains available upon request and will follow up per protocol  Shasha Hare RD, CDN, Pager # [phone]

## 2018-12-20 NOTE — CONSULT NOTE ADULT - ATTENDING COMMENTS
Patient seen and examined.  Agree with resident note as above.  Patient with hx as noted including dementia, systolic CHF EF20, infected R hip s/p hardware removal, spacer placement, +MRSA tx now on minocycline.  Pt originally admitted for acute on chronic systolic heart failure complicated by MDR Pseudomonas UTI s/p tx with Zerbaxa, acute RLE DVT now on AC.  Reportedly pt was preparing for discharge and this evening developed fever and hypotension.  Pt given 1L NS in divided doses with good BP response.  BCx sent, Zerbaxa restarted.  Check fungal cx.  Will defer to ID re: necessity of broadening antimicrobial coverage.  Pt now alert with acceptable BP.  Maintain on medical floor.  Full recs as above and I have edited where appropriate.  Discussed with primary team and family at bedside.

## 2018-12-20 NOTE — CHART NOTE - NSCHARTNOTEFT_GEN_A_CORE
Attempted to replace Blackman catheter multiple times without success. Urology consulted.      Juju Kraft  Spectra-link [id number]

## 2018-12-20 NOTE — PROGRESS NOTE ADULT - SUBJECTIVE AND OBJECTIVE BOX
---___---___---___---___---___---___ ---___---___---___---___---___---___---___---___---___---                    <<<  M E D I C A L   A T T E N D I N G    F O L L O W    U P   N O T E  >>>    - Patient seen and examined by me approximately thirty minutes ago.  - In summary, patient is a 68y year old Female who origianlly presented with chf anf now with fever on going had rrt yesterday and was givel fluid bolus . today still with fever but stable      ---___---___---___---___---___---      <<<  MEDICATIONS:  >>>    MEDICATIONS  (STANDING):  ALBUTerol/ipratropium for Nebulization 3 milliLiter(s) Nebulizer every 6 hours  apixaban 10 milliGRAM(s) Oral every 12 hours  AQUAPHOR (petrolatum Ointment) 1 Application(s) Topical three times a day  buDESOnide 160 MICROgram(s)/formoterol 4.5 MICROgram(s) Inhaler 2 Puff(s) Inhalation two times a day  clobetasol 0.05% Ointment 1 Application(s) Topical two times a day  clonazePAM Tablet 0.5 milliGRAM(s) Oral at bedtime  dextrose 5%. 1000 milliLiter(s) (50 mL/Hr) IV Continuous <Continuous>  dextrose 50% Injectable 12.5 Gram(s) IV Push once  dextrose 50% Injectable 25 Gram(s) IV Push once  dextrose 50% Injectable 25 Gram(s) IV Push once  fentaNYL   Patch  12 MICROgram(s)/Hr 1 Patch Transdermal every 72 hours  ferrous    sulfate Liquid 300 milliGRAM(s) Enteral Tube daily  gabapentin   Solution 300 milliGRAM(s) Oral two times a day  insulin lispro (HumaLOG) corrective regimen sliding scale   SubCutaneous every 6 hours  medical marijuana oil 1 milliLiter(s),medical marijuana oil 1 milliLiter(s) 1 milliLiter(s) SubLingual <User Schedule>  minocycline 100 milliGRAM(s) Oral two times a day  mirtazapine 7.5 milliGRAM(s) Oral at bedtime  pantoprazole   Suspension 40 milliGRAM(s) Oral before breakfast  predniSONE   Tablet 7.5 milliGRAM(s) Oral <User Schedule>  QUEtiapine 25 milliGRAM(s) Oral at bedtime  sertraline 50 milliGRAM(s) Oral daily  tiotropium 18 MICROgram(s) Capsule 1 Capsule(s) Inhalation daily  tiZANidine 4 milliGRAM(s) Oral <User Schedule>      MEDICATIONS  (PRN):  acetaminophen    Suspension .. 680 milliGRAM(s) Oral every 6 hours PRN Temp greater or equal to 38C (100.4F), Mild Pain (1 - 3)  dextrose 40% Gel 15 Gram(s) Oral once PRN Blood Glucose LESS THAN 70 milliGRAM(s)/deciliter  glucagon  Injectable 1 milliGRAM(s) IntraMuscular once PRN Glucose LESS THAN 70 milligrams/deciliter  nystatin Powder 1 Application(s) Topical two times a day PRN rash/itchiness       ---___---___---___---___---___---     <<<REVIEW OF SYSTEM: >>>    unable to obtain     ---___---___---___---___---___---          <<<  VITAL SIGNS: >>>    T(F): 101.2 (18 @ 11:40), Max: 102.1 (18 @ 20:28)  HR: 99 (18 @ 11:40) (88 - 106)  BP: 135/69 (18 @ 11:40) (84/38 - 135/69)  RR: 19 (18 @ 11:40) (17 - 19)  SpO2: 95% (18 @ 11:40) (93% - 96%)  Wt(kg): --  CAPILLARY BLOOD GLUCOSE      POCT Blood Glucose.: 196 mg/dL (20 Dec 2018 07:28)    I&O's Summary    19 Dec 2018 07:01  -  20 Dec 2018 07:00  --------------------------------------------------------  IN: 0 mL / OUT: 2100 mL / NET: -2100 mL         ---___---___---___---___---___---                       PHYSICAL EXAM:    GEN: A&O X 0, NAD , comfortable  HEENT: NCAT, PERRL, MMM, no scleral icterus, hearing intact  NECK: Supple, No JVD  CVS: S1S2 , regular , No M/R/G appreciated  PULM: CTA B/L,  no W/R/R appreciated  ABD.: soft. non tender, non distended,  bowel sounds present peg noted   Extrem: intact pulses , no edema noted  Derm: sacral decubitus noted   PSYCH: normal mood, no depression, not anxious     ---___---___---___---___---___---     <<<  LAB AND IMAGING: >>>                          10.0   14.8  )-----------( 361      ( 20 Dec 2018 05:13 )             29.4               12-20    139  |  101  |  92<H>  ----------------------------<  142<H>  4.4   |  23  |  0.81    Ca    9.0      20 Dec 2018 04:50      PT/INR - ( 20 Dec 2018 05:12 )   PT: 15.9 sec;   INR: 1.38 ratio         PTT - ( 20 Dec 2018 05:12 )  PTT:31.0 sec       Urinalysis Basic - ( 20 Dec 2018 00:18 )    Color: Yellow / Appearance: Slightly Turbid / S.017 / pH: x  Gluc: x / Ketone: Negative  / Bili: Negative / Urobili: Negative   Blood: x / Protein: 30 mg/dL / Nitrite: Negative   Leuk Esterase: Large / RBC: 23 /hpf /  /hpf   Sq Epi: x / Non Sq Epi: 0 /hpf / Bacteria: Negative                        [All pertinent / recent available Imaging reports and other labs reviewed]     ---___---___---___---___---___---___ ---___---___---___---___---           <<<  A S S E S S M E N T   A N D   P L A N :  >>>          -GI/DVT Prophylaxis.    --------------------------------------------  Case discussed with  at bedside  Education given on     >>______________________<<      Deniz Bolden .         phone   [phone]

## 2018-12-20 NOTE — CHART NOTE - NSCHARTNOTEFT_GEN_A_CORE
RRT called on patient for hypotension    This is a 68 year old woman with advanced dementia with right prosthetic hip MRSA infection in 07/2018 s/p pacer in place on oral Minocycline, past episodes of UTI and aspiration pneumonia initially sent in by her PCP for elevated proBNP thought to be secondary to acute decompensated heart failure now improving on Lasix 40 mg daily.  Patient has LV systolic dysfunction, EF 26%.    Course has been complicated by fever with urine culture from 12/14 growing carbapenem resistant Pseudomonas and she was started on Zerbaxa on 12/9 and finished a 7 day course (last day 12/16). Patient also was found to have DVT, currently on apixaban.  Prior to RRT called, patient had a temp of 102, CXR showing increased vascular congestion.  Has positive UA and therefore started on zerbaxa again.      Upon arrival, patient's vitals show BP was 74/30. Rectal temp was 100.2. The patient denied SOB, but had rales bilaterally at the bases. She was given a 500cc bolus, in addition to the 500cc bolus given prior. Levophed was drawn, but was not used, as the BP improved to 93/51. MICU was consulted given unable to give further fluid in setting of CHF.     I had spent 35 minutes of critical care time in management of this patient for hypotension.  I had discussed the case with MAR and primary team.  Family by bedside, was notified.  For further details, please refer to RRT form or MAR note.

## 2018-12-20 NOTE — PROGRESS NOTE ADULT - ASSESSMENT
68 year-old woman with Alzheimer's dementia seen to have volume overload and biventricular congestive heart failure.  TTE on this admission shows reduced LVEF, elevated pulmonary pressures.    Given severely reduced LVEF, plan to continue ACEi and uptitrate as blood pressure permits. Currently on hold in light of hypotension.  Will defer starting beta-blocker in this patient as she has baseline restrictive airway disease and is beta-agonist inhalers. Can reconsider low dose carvedilol as outpatient.    Patient with recurrent fevers - follow-up ID recommendations for antibiotics (currently being treated for MDR pseudomonas).  RLE DVT could be etiology of fevers.    Continue anticoagulation with apixaban 10mg BID for seven days followed by 5mg BID thereafter.    ID and orthopedic surgery consults noted.  MICU consult appreciated for sepsis.

## 2018-12-20 NOTE — PHYSICAL THERAPY INITIAL EVALUATION ADULT - ADDITIONAL COMMENTS
pt lives in a house with her . prior to admission she was dependent for all mobility and ALDs. she has a pablo lift for OOB to wheelchair. she has home health aide 4 hours and assist from  and 2 daughters

## 2018-12-20 NOTE — CONSULT NOTE ADULT - ASSESSMENT
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 Course complicated by MDR Pseudomonas in urine and MICU consulted for possible septic shock.     # Neuro:  - AAOX0, mental status at baseline secondary to advanced dementia    # Resp:  - O2 saturation >95% on room air    # CV:  - Patient was hypotensive to 80/30 responded to total of 1 L NS bolus now /54  - Likely secondary to severe sepsis with suspected source UTI due to chronic indwelling catheter  - Continue to monitor vitals Q4H  - If patient's blood pressure <90/50, patient likely will need to be transferred to ICU for pressor support as she has history of systolic HF with EF 26% and already has crackles on lung exam    # GI:  - No acute issue  - Continue with tube feed    # :  - Patient with chronic indwelling gavin likely colonized with MDR Pseudomonas  - Obtain UA and urine culture to guide antibiotic therapy     # ID:  - Patient was febrile up to 101.1 at 11 pm on 12/19, also with tachycardia and leukocytosis meeting SIRS criteria with likely source UTI  - Patient recently completed 7 day course of Zerbaxa for MDR Pseudomonas UTI   - Continue with Zerbaxa for empiric coverage while awaiting for blood and urine cultures  - Follow up ID recs    Patient at this time responded to IVF, thus does not need ICU care. Plan discussed with the primary team. Patient is seen with the MICU attending, Dr. Ortiz.     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 Course complicated by MDR Pseudomonas in urine s/p tx but this evening +recurrent fever and hypoTN and MICU consulted for possible septic shock.     # Neuro:  - AAOX0, mental status at baseline secondary to advanced dementia    # Resp:  - O2 saturation >95% on room air    # CV:  - Patient was hypotensive to 80/30 responded to total of 1 L NS bolus now /54  - Likely secondary to severe sepsis with suspected source UTI due to chronic indwelling catheter vs PNA vs leg  - Continue to monitor vitals Q4H  - If pt has recurrent hypoTN, will likely need to be transferred to ICU for pressor support as she has history of systolic HF with EF 26% and already has crackles on lung exam    # GI:  - No acute issue  - Continue with tube feed    # :  - Patient with chronic indwelling gavin likely colonized with MDR Pseudomonas  - Obtain UA and urine culture to guide antibiotic therapy     # ID:  - Patient was febrile up to 101.1 at 11 pm on 12/19, also with tachycardia and leukocytosis meeting SIRS criteria with likely source UTI  - Patient recently completed 7 day course of Zerbaxa for MDR Pseudomonas UTI   - Continue with Zerbaxa for empiric coverage while awaiting for blood and urine cultures  -check fungal cx  - Follow up ID recs    Patient at this time responded to IVF, thus does not need ICU care. Plan discussed with the primary team. Patient is seen with the MICU attending, Dr. Ortiz.     Darlin Godoy PGY-2  Internal medicine MICU  Pager [phone]

## 2018-12-20 NOTE — CHART NOTE - NSCHARTNOTEFT_GEN_A_CORE
called by RN as pt became hypotensive 85/37. Repeat temp 99.7. Pt given Normal saline 250 cc X 2 with minimal response. Labs noted with WBC of 14.3. Zerbaxa 1.5 gram IV X 1 ordered. BP decreased to 74/40. RRT called. During RRT pt given Normal saline 500cc bolus. MICU consulted. Stat labs sent. Will follow up MICU consult. Will follow up labs. called by RN as pt became hypotensive 85/37. Repeat temp 99.7. Pt given Normal saline 250 cc X 2 with minimal response. Labs noted with WBC of 14.3. Zerbaxa 1.5 gram IV X 1 ordered. BP decreased to 80/50, . RRT called. During RRT pt given Normal saline 500cc bolus. MICU consulted. Stat labs sent. Will follow up MICU consult. Dr. Bolden made aware. BP now 106/50, afebrile. Will continue to monitor. F/U with primary team and ID in AM.    Deepti Regalado NP  [id number] called by RN as pt became hypotensive 85/37. Repeat temp 99.7. Pt given Normal saline 250 cc X 2 with minimal response. Labs noted with WBC of 14.3. Zerbaxa 1.5 gram IV X 1 ordered. BP decreased to 80/50, . RRT called. During RRT pt given Normal saline 500cc bolus. MICU consulted. Stat labs sent.   Complete Blood Count (12.20.18 @ 05:13)    WBC Count: 14.8 K/uL    RBC Count: 3.18 M/uL    Hemoglobin: 10.0 g/dL    Hematocrit: 29.4 %    Mean Cell Volume: 92.4 fl    Mean Cell Hemoglobin: 31.4 pg    Mean Cell Hemoglobin Conc: 34.0 gm/dL    Red Cell Distrib Width: 14.3 %    Platelet Count - Automated: 361 K/uL  Basic Metabolic Panel in AM (12.20.18 @ 04:50)    Sodium, Serum: 139 mmol/L    Potassium, Serum: 4.4 mmol/L    Chloride, Serum: 101 mmol/L    Carbon Dioxide, Serum: 23 mmol/L    Anion Gap, Serum: 15 mmol/L    Blood Urea Nitrogen, Serum: 92 mg/dL    Creatinine, Serum: 0.81 mg/dL    Glucose, Serum: 142 mg/dL    Calcium, Total Serum: 9.0 mg/dL      Will follow up MICU consult. Dr. Bolden made aware. BP now 103/54, afebrile. Will continue to monitor. F/U with primary team and ID in AM.    Deepti Regalado NP  [id number] called by RN as pt became hypotensive 85/37. Repeat temp 99.7. Pt given Normal saline 250 cc X 2 with minimal response. Labs noted with WBC of 14.3. Zerbaxa 1.5 gram IV X 1 ordered. BP decreased to 80/50, . RRT called. During RRT pt given Normal saline 500cc bolus. MICU consulted. Stat labs sent.   Complete Blood Count (12.20.18 @ 05:13)    WBC Count: 14.8 K/uL    RBC Count: 3.18 M/uL    Hemoglobin: 10.0 g/dL    Hematocrit: 29.4 %    Mean Cell Volume: 92.4 fl    Mean Cell Hemoglobin: 31.4 pg    Mean Cell Hemoglobin Conc: 34.0 gm/dL    Red Cell Distrib Width: 14.3 %    Platelet Count - Automated: 361 K/uL  Basic Metabolic Panel in AM (12.20.18 @ 04:50)    Sodium, Serum: 139 mmol/L    Potassium, Serum: 4.4 mmol/L    Chloride, Serum: 101 mmol/L    Carbon Dioxide, Serum: 23 mmol/L    Anion Gap, Serum: 15 mmol/L    Blood Urea Nitrogen, Serum: 92 mg/dL    Creatinine, Serum: 0.81 mg/dL    Glucose, Serum: 142 mg/dL    Calcium, Total Serum: 9.0 mg/dL      Will hold Lasix and Lisinopril.  Will follow up MICU consult. Dr. Bolden made aware. BP now 103/54, afebrile. Will continue to monitor. F/U with primary team and ID in AM.     Deepti Regalado NP  [phone]

## 2018-12-20 NOTE — PROGRESS NOTE ADULT - SUBJECTIVE AND OBJECTIVE BOX
HPI:  Fevers overnight.  Hypotension in the setting of fever noted.  RRT from earlier this morning noted.    Review Of Systems:         Unable to assess in the setting of dementia    Medications:  acetaminophen    Suspension .. 680 milliGRAM(s) Oral every 6 hours PRN  ALBUTerol/ipratropium for Nebulization 3 milliLiter(s) Nebulizer every 6 hours  apixaban 10 milliGRAM(s) Oral every 12 hours  AQUAPHOR (petrolatum Ointment) 1 Application(s) Topical three times a day  buDESOnide 160 MICROgram(s)/formoterol 4.5 MICROgram(s) Inhaler 2 Puff(s) Inhalation two times a day  clobetasol 0.05% Ointment 1 Application(s) Topical two times a day  clonazePAM Tablet 0.5 milliGRAM(s) Oral at bedtime  dextrose 40% Gel 15 Gram(s) Oral once PRN  dextrose 5%. 1000 milliLiter(s) IV Continuous <Continuous>  dextrose 50% Injectable 12.5 Gram(s) IV Push once  dextrose 50% Injectable 25 Gram(s) IV Push once  dextrose 50% Injectable 25 Gram(s) IV Push once  fentaNYL   Patch  12 MICROgram(s)/Hr 1 Patch Transdermal every 72 hours  ferrous    sulfate Liquid 300 milliGRAM(s) Enteral Tube daily  gabapentin   Solution 300 milliGRAM(s) Oral two times a day  glucagon  Injectable 1 milliGRAM(s) IntraMuscular once PRN  insulin lispro (HumaLOG) corrective regimen sliding scale   SubCutaneous every 6 hours  medical marijuana oil 1 milliLiter(s),medical marijuana oil 1 milliLiter(s) 1 milliLiter(s) SubLingual <User Schedule>  minocycline 100 milliGRAM(s) Oral two times a day  mirtazapine 7.5 milliGRAM(s) Oral at bedtime  nystatin Powder 1 Application(s) Topical two times a day PRN  pantoprazole   Suspension 40 milliGRAM(s) Oral before breakfast  predniSONE   Tablet 7.5 milliGRAM(s) Oral <User Schedule>  QUEtiapine 25 milliGRAM(s) Oral at bedtime  sertraline 50 milliGRAM(s) Oral daily  sodium chloride 0.9% Bolus 250 milliLiter(s) IV Bolus once  tiotropium 18 MICROgram(s) Capsule 1 Capsule(s) Inhalation daily  tiZANidine 4 milliGRAM(s) Oral <User Schedule>    PAST MEDICAL & SURGICAL HISTORY:  CVA (cerebral vascular accident)  Fatty pancreas  PNA (pneumonia)  Pulmonary HTN  IGT (impaired glucose tolerance)  Ulcerative colitis  Acid reflux  Anxiety  Depression  Mouth sores  HLD (hyperlipidemia)  Asthma  Humeral head fracture  H/O: hysterectomy  H/O cataract extraction, left  History of knee replacement    Vitals:  T(C): 38.1 (18 @ 16:29), Max: 38.9 (18 @ 20:28)  HR: 100 (18 @ 16:29) (90 - 106)  BP: 96/58 (18 @ 16:29) (84/38 - 135/69)  BP(mean): --  RR: 19 (18 @ 16:29) (17 - 19)  SpO2: 95% (18 @ 16:29) (93% - 96%)  Wt(kg): --  Daily     Daily Weight in k.2 (20 Dec 2018 08:25)  I&O's Summary    19 Dec 2018 07:01  -  20 Dec 2018 07:00  --------------------------------------------------------  IN: 0 mL / OUT: 2100 mL / NET: -2100 mL        Physical Exam:  Appearance: appears older than stated age; bedbound, demented; no acute distress  Eyes: PERRL, EOMI, pink conjunctiva  HENT: Normal oral mucosa  Cardiovascular: RRR, S1, S2; no LE edema bilaterally; normal JVP  Respiratory: poor inspiratory effort  Gastrointestinal: soft, non-tender, non-distended with normal bowel sounds; +PEG  Musculoskeletal: Bedbound, contracted  Neurologic: cranial nerves grossly intact  Lymphatic: No lymphadenopathy  Psychiatry: awake and alert  Skin: No rashes, ecchymoses, or cyanosis                          10.0   14.8  )-----------( 361      ( 20 Dec 2018 05:13 )             29.4         139  |  101  |  92<H>  ----------------------------<  142<H>  4.4   |  23  |  0.81    Ca    9.0      20 Dec 2018 04:50      PT/INR - ( 20 Dec 2018 05:12 )   PT: 15.9 sec;   INR: 1.38 ratio         PTT - ( 20 Dec 2018 05:12 )  PTT:31.0 sec        Echo: < from: Transthoracic Echocardiogram (18 @ 11:23) >  ------------------------------------------------------------------------  Conclusions:  1. Mitral annular calcification, otherwise normal mitral  valve. Moderate-severe mitral regurgitation (vena contracta  0.8 cm)  2. Calcified trileafletaortic valve with normal opening.  Peak transaortic valve gradient equals 3 mm Hg, mean  transaortic valve gradient equals 1 mm Hg, aortic valve  velocity time integral equals 13 cm. Moderate aortic  regurgitation  3. Moderately dilated left atrium.LA volume index = 45  cc/m2.  4. Severe global left ventricular systolic dysfunction.  5. Increased E/e'  is consistent with elevated left  ventricular filling pressure.  6. Moderate right atrial enlargement. Inferior vena cava is  dilated (>=2.5cm) with normal respiratory variability  consistent with right atrial pressure 16-20mm Hg.  7. Right ventricular enlargement with decreased right  ventricular systolic function.  8. Normal tricuspid valve. Severe tricuspid regurgitation.  9. Estimated pulmonary artery systolic pressure equals 82  mm Hg, assuming right atrial pressure equals 8 mm Hg,  consistent with severe pulmonary pressures.  10. Color Doppler demonstrates evidence of left to right  shunt  ------------------------------------------------------------------------  Confirmed on  12/3/2018 - 16:37:07 by Margaret Khan M.D.  ------------------------------------------------------------------------    < end of copied text >    Imaging: < from: Xray Chest 1 View- PORTABLE-Urgent (18 @ 17:19) >  IMPRESSION:   Limited study due to patient positioning.  Trace right pleural effusion and likely left pleural effusion. Question   mild pulmonary edema.    HUAN JACOBS M.D., RADIOLOGY RESIDENT  This document has been electronically signed.  TONYA AARON M.D., ATTENDING RADIOLOGIST  This document has been electronically signed. Dec  2 2018  9:17PM      < end of copied text >    < from: [facility] Duplex Lower Ext Vein Scan, Bilat (12.15.18 @ 14:01) >  IMPRESSION:     Acute, above the knee deep venous thrombosis extending from the right   common femoral vein through the popliteal vein and into the right   gastrocnemius vein.    No left lower extremity DVT.    The above findings were discussed with BENJI Ryan by Dr. Tapia on   12/15/2018 2:43 PM, with read-back verification.    YELENA TAPIA M.D., RADIOLOGY RESIDENT  This document has been electronically signed.  PRIMO MURILLO M.D., ATTENDING RADIOLOGIST  This document has been electronically signed. Dec 15 2018  3:55PM        < end of copied text >    < from: NM Pulmonary Ventilation/Perfusion Scan (12.15.18 @ 10:13) >  FINDINGS: There is heterogeneous distribution of radiopharmaceutical in   both lungs on the ventilation and perfusion images. There are no   segmental perfusion defects in either lung. No significant interval   change since the previous study of 12/3/2018.    IMPRESSION: Very low probability of pulmonary embolus.    KRYSTYNA JULIAN M.D., CHIEF OF NUCLEAR MEDICINE  This document has been electronically signed. Dec 15 2018 10:15AM    < end of copied text >    Interpretation of Telemetry: Patient taken off tele

## 2018-12-20 NOTE — PROGRESS NOTE ADULT - SUBJECTIVE AND OBJECTIVE BOX
CC: f/u for fever    Patient reports: she is non verbal, contracted,very wasted.Recent events noted, she is not very alert, opens eyes but poorly interactive.Recent fever with lower BP, given fluids, gavin placed    REVIEW OF SYSTEMS:  All other review of systems negative (Comprehensive ROS)    Antimicrobials Day #  :long term  minocycline 100 milliGRAM(s) Oral two times a day    Other Medications Reviewed    T(F): 101 (18 @ 14:16), Max: 102.1 (18 @ 20:28)  HR: 106 (18 @ 14:16)  BP: 108/58 (18 @ 14:16)  RR: 19 (18 @ 14:16)  SpO2: 93% (18 @ 14:16)  Wt(kg): --    PHYSICAL EXAM:  General:lethargic, no acute distress  Eyes:  anicteric, no conjunctival injection, no discharge  Oropharynx: no lesions or injection 	  Neck: supple, without adenopathy  Lungs: clear to auscultation, poor inspiratory effert  Heart: regular rate and rhythm; no murmur, rubs or gallops  Abdomen: soft, nondistended, nontender, without mass or organomegaly, peg  Skin: rt knee wound dressed  Extremities: no clubbing, cyanosis, or edema, contracted  Neurologic: lethargic, poorly interactive    LAB RESULTS:                        10.0   14.8  )-----------( 361      ( 20 Dec 2018 05:13 )             29.4     12-    139  |  101  |  92<H>  ----------------------------<  142<H>  4.4   |  23  |  0.81    Ca    9.0      20 Dec 2018 04:50        Urinalysis Basic - ( 20 Dec 2018 00:18 )    Color: Yellow / Appearance: Slightly Turbid / S.017 / pH: x  Gluc: x / Ketone: Negative  / Bili: Negative / Urobili: Negative   Blood: x / Protein: 30 mg/dL / Nitrite: Negative   Leuk Esterase: Large / RBC: 23 /hpf /  /hpf   Sq Epi: x / Non Sq Epi: 0 /hpf / Bacteria: Negative      MICROBIOLOGY:  RECENT CULTURES:    12/15 urine culture with [facility]'s  RADIOLOGY REVIEWED:    < from: Xray Chest 1 View- PORTABLE-Urgent (.20.18 @ 01:58) >  Indication: Fever.    Impression:    The heart is slightly enlarged. The lungs appear to be clear. No   radiographic evidence for congestive heart failure or pneumonia.    < end of copied text >

## 2018-12-21 DIAGNOSIS — I50.9 HEART FAILURE, UNSPECIFIED: ICD-10-CM

## 2018-12-21 LAB
ANION GAP SERPL CALC-SCNC: 17 MMOL/L — SIGNIFICANT CHANGE UP (ref 5–17)
BASOPHILS # BLD AUTO: 0.04 K/UL — SIGNIFICANT CHANGE UP (ref 0–0.2)
BASOPHILS NFR BLD AUTO: 0.3 % — SIGNIFICANT CHANGE UP (ref 0–2)
BUN SERPL-MCNC: 78 MG/DL — HIGH (ref 7–23)
CALCIUM SERPL-MCNC: 10.2 MG/DL — SIGNIFICANT CHANGE UP (ref 8.4–10.5)
CHLORIDE SERPL-SCNC: 99 MMOL/L — SIGNIFICANT CHANGE UP (ref 96–108)
CO2 SERPL-SCNC: 24 MMOL/L — SIGNIFICANT CHANGE UP (ref 22–31)
CREAT SERPL-MCNC: 0.7 MG/DL — SIGNIFICANT CHANGE UP (ref 0.5–1.3)
EOSINOPHIL # BLD AUTO: 1.05 K/UL — HIGH (ref 0–0.5)
EOSINOPHIL NFR BLD AUTO: 7.9 % — HIGH (ref 0–6)
GLUCOSE BLDC GLUCOMTR-MCNC: 127 MG/DL — HIGH (ref 70–99)
GLUCOSE BLDC GLUCOMTR-MCNC: 147 MG/DL — HIGH (ref 70–99)
GLUCOSE BLDC GLUCOMTR-MCNC: 176 MG/DL — HIGH (ref 70–99)
GLUCOSE SERPL-MCNC: 132 MG/DL — HIGH (ref 70–99)
HCT VFR BLD CALC: 33.8 % — LOW (ref 34.5–45)
HGB BLD-MCNC: 10.8 G/DL — LOW (ref 11.5–15.5)
IMM GRANULOCYTES NFR BLD AUTO: 0.2 % — SIGNIFICANT CHANGE UP (ref 0–1.5)
LYMPHOCYTES # BLD AUTO: 1.25 K/UL — SIGNIFICANT CHANGE UP (ref 1–3.3)
LYMPHOCYTES # BLD AUTO: 9.4 % — LOW (ref 13–44)
MCHC RBC-ENTMCNC: 30.6 PG — SIGNIFICANT CHANGE UP (ref 27–34)
MCHC RBC-ENTMCNC: 32 GM/DL — SIGNIFICANT CHANGE UP (ref 32–36)
MCV RBC AUTO: 95.8 FL — SIGNIFICANT CHANGE UP (ref 80–100)
MONOCYTES # BLD AUTO: 1.14 K/UL — HIGH (ref 0–0.9)
MONOCYTES NFR BLD AUTO: 8.6 % — SIGNIFICANT CHANGE UP (ref 2–14)
NEUTROPHILS # BLD AUTO: 9.75 K/UL — HIGH (ref 1.8–7.4)
NEUTROPHILS NFR BLD AUTO: 73.6 % — SIGNIFICANT CHANGE UP (ref 43–77)
PLATELET # BLD AUTO: 422 K/UL — HIGH (ref 150–400)
POTASSIUM SERPL-MCNC: 4.1 MMOL/L — SIGNIFICANT CHANGE UP (ref 3.5–5.3)
POTASSIUM SERPL-SCNC: 4.1 MMOL/L — SIGNIFICANT CHANGE UP (ref 3.5–5.3)
RAPID RVP RESULT: SIGNIFICANT CHANGE UP
RBC # BLD: 3.53 M/UL — LOW (ref 3.8–5.2)
RBC # FLD: 16 % — HIGH (ref 10.3–14.5)
SODIUM SERPL-SCNC: 140 MMOL/L — SIGNIFICANT CHANGE UP (ref 135–145)
WBC # BLD: 13.26 K/UL — HIGH (ref 3.8–10.5)
WBC # FLD AUTO: 13.26 K/UL — HIGH (ref 3.8–10.5)

## 2018-12-21 RX ORDER — SODIUM CHLORIDE 9 MG/ML
250 INJECTION INTRAMUSCULAR; INTRAVENOUS; SUBCUTANEOUS ONCE
Qty: 0 | Refills: 0 | Status: COMPLETED | OUTPATIENT
Start: 2018-12-21 | End: 2018-12-21

## 2018-12-21 RX ADMIN — Medication 3 MILLILITER(S): at 06:20

## 2018-12-21 RX ADMIN — FENTANYL CITRATE 1 PATCH: 50 INJECTION INTRAVENOUS at 07:00

## 2018-12-21 RX ADMIN — BUDESONIDE AND FORMOTEROL FUMARATE DIHYDRATE 2 PUFF(S): 160; 4.5 AEROSOL RESPIRATORY (INHALATION) at 07:13

## 2018-12-21 RX ADMIN — Medication 0.5 MILLIGRAM(S): at 22:33

## 2018-12-21 RX ADMIN — Medication 3 MILLILITER(S): at 18:38

## 2018-12-21 RX ADMIN — MINOCYCLINE HYDROCHLORIDE 100 MILLIGRAM(S): 45 TABLET, EXTENDED RELEASE ORAL at 06:23

## 2018-12-21 RX ADMIN — TIZANIDINE 4 MILLIGRAM(S): 4 TABLET ORAL at 21:05

## 2018-12-21 RX ADMIN — MIRTAZAPINE 7.5 MILLIGRAM(S): 45 TABLET, ORALLY DISINTEGRATING ORAL at 22:33

## 2018-12-21 RX ADMIN — GABAPENTIN 300 MILLIGRAM(S): 400 CAPSULE ORAL at 18:40

## 2018-12-21 RX ADMIN — Medication 680 MILLIGRAM(S): at 13:45

## 2018-12-21 RX ADMIN — Medication 1 APPLICATION(S): at 18:39

## 2018-12-21 RX ADMIN — FENTANYL CITRATE 1 PATCH: 50 INJECTION INTRAVENOUS at 18:57

## 2018-12-21 RX ADMIN — Medication 3 MILLILITER(S): at 12:48

## 2018-12-21 RX ADMIN — Medication 1 APPLICATION(S): at 06:21

## 2018-12-21 RX ADMIN — Medication 7.5 MILLIGRAM(S): at 10:00

## 2018-12-21 RX ADMIN — MINOCYCLINE HYDROCHLORIDE 100 MILLIGRAM(S): 45 TABLET, EXTENDED RELEASE ORAL at 18:41

## 2018-12-21 RX ADMIN — Medication 300 MILLIGRAM(S): at 12:49

## 2018-12-21 RX ADMIN — PANTOPRAZOLE SODIUM 40 MILLIGRAM(S): 20 TABLET, DELAYED RELEASE ORAL at 06:22

## 2018-12-21 RX ADMIN — APIXABAN 10 MILLIGRAM(S): 2.5 TABLET, FILM COATED ORAL at 18:38

## 2018-12-21 RX ADMIN — Medication 680 MILLIGRAM(S): at 18:37

## 2018-12-21 RX ADMIN — BUDESONIDE AND FORMOTEROL FUMARATE DIHYDRATE 2 PUFF(S): 160; 4.5 AEROSOL RESPIRATORY (INHALATION) at 18:41

## 2018-12-21 RX ADMIN — Medication 680 MILLIGRAM(S): at 12:48

## 2018-12-21 RX ADMIN — FENTANYL CITRATE 1 PATCH: 50 INJECTION INTRAVENOUS at 19:00

## 2018-12-21 RX ADMIN — Medication 1 APPLICATION(S): at 22:36

## 2018-12-21 RX ADMIN — APIXABAN 10 MILLIGRAM(S): 2.5 TABLET, FILM COATED ORAL at 06:20

## 2018-12-21 RX ADMIN — GABAPENTIN 300 MILLIGRAM(S): 400 CAPSULE ORAL at 07:38

## 2018-12-21 RX ADMIN — FENTANYL CITRATE 1 PATCH: 50 INJECTION INTRAVENOUS at 06:44

## 2018-12-21 RX ADMIN — Medication 1: at 12:31

## 2018-12-21 RX ADMIN — Medication 1 APPLICATION(S): at 14:00

## 2018-12-21 RX ADMIN — SODIUM CHLORIDE 10.42 MILLILITER(S): 9 INJECTION INTRAMUSCULAR; INTRAVENOUS; SUBCUTANEOUS at 14:00

## 2018-12-21 RX ADMIN — Medication 1 APPLICATION(S): at 06:46

## 2018-12-21 RX ADMIN — SERTRALINE 50 MILLIGRAM(S): 25 TABLET, FILM COATED ORAL at 12:49

## 2018-12-21 RX ADMIN — TIZANIDINE 4 MILLIGRAM(S): 4 TABLET ORAL at 09:59

## 2018-12-21 RX ADMIN — QUETIAPINE FUMARATE 25 MILLIGRAM(S): 200 TABLET, FILM COATED ORAL at 22:33

## 2018-12-21 NOTE — PROVIDER CONTACT NOTE (OTHER) - ASSESSMENT
other  vital signs and fingerstick taken but  refused to turn the patient at midnight. Requested we check the rectal temperature at 0600.

## 2018-12-21 NOTE — ED ADULT TRIAGE NOTE - SOURCE OF INFORMATION
"Requested Prescriptions   Pending Prescriptions Disp Refills     metoprolol tartrate (LOPRESSOR) 50 MG tablet [Pharmacy Med Name: METOPROLOL TARTRATE 50MG TABS] 180 tablet 3    Last Written Prescription Date:  12/18/17  Last Fill Quantity: 180,  # refills: 3   Last office visit: 2/16/2018 with prescribing provider:  2/16/18   Future Office Visit:     Sig: TAKE ONE TABLET BY MOUTH TWICE A DAY    Beta-Blockers Protocol Passed - 12/20/2018  7:46 AM       Passed - Blood pressure under 140/90 in past 12 months    BP Readings from Last 3 Encounters:   02/16/18 122/80   01/15/18 130/84   12/18/17 132/84                Passed - Patient is age 6 or older       Passed - Recent (12 mo) or future (30 days) visit within the authorizing provider's specialty    Patient had office visit in the last 12 months or has a visit in the next 30 days with authorizing provider or within the authorizing provider's specialty.  See \"Patient Info\" tab in inbasket, or \"Choose Columns\" in Meds & Orders section of the refill encounter.              lisinopril (PRINIVIL/ZESTRIL) 20 MG tablet [Pharmacy Med Name: LISINOPRIL 20MG TABS] 90 tablet 3    Last Written Prescription Date:  12/18/17  Last Fill Quantity: 180,  # refills: 3   Last office visit: 2/16/2018 with prescribing provider:  2/16/18   Future Office Visit:     Sig: TAKE ONE TABLET BY MOUTH EVERY DAY    ACE Inhibitors (Including Combos) Protocol Failed - 12/20/2018  7:46 AM       Failed - Normal serum creatinine on file in past 12 months    Recent Labs   Lab Test 12/18/17  0958   CR 0.77            Failed - Normal serum potassium on file in past 12 months    Recent Labs   Lab Test 12/18/17  0958   POTASSIUM 4.0            Passed - Blood pressure under 140/90 in past 12 months    BP Readings from Last 3 Encounters:   02/16/18 122/80   01/15/18 130/84   12/18/17 132/84                Passed - Recent (12 mo) or future (30 days) visit within the authorizing provider's specialty    Patient had " "office visit in the last 12 months or has a visit in the next 30 days with authorizing provider or within the authorizing provider's specialty.  See \"Patient Info\" tab in inbasket, or \"Choose Columns\" in Meds & Orders section of the refill encounter.             Passed - Patient is age 18 or older       Passed - No active pregnancy on record       Passed - No positive pregnancy test in past 12 months        " EMS

## 2018-12-21 NOTE — PROGRESS NOTE ADULT - SUBJECTIVE AND OBJECTIVE BOX
CC: f/u for fever    Patient reports: she is a little more alert today,attentive, appears comfortable    REVIEW OF SYSTEMS:  All other review of systems negative (Comprehensive ROS)    Antimicrobials Day #  :  minocycline 100 milliGRAM(s) Oral two times a day    Other Medications Reviewed    T(F): 100.3 (18 @ 11:05), Max: 101.7 (18 @ 22:23)  HR: 95 (18 @ 11:05)  BP: 123/63 (18 @ 11:05)  RR: 17 (18 @ 11:05)  SpO2: 98% (18 @ 11:05)  Wt(kg): --    PHYSICAL EXAM:  General: alert, no acute distress, chronically ill appearing  Eyes:  anicteric, no conjunctival injection, no discharge  Oropharynx: no lesions or injection 	  Neck: supple, without adenopathy  Lungs: clear to auscultation, poor inspiratory effert  Heart: regular rate and rhythm; no murmur, rubs or gallops  Abdomen: soft, nondistended, nontender, without mass or organomegaly  Skin: no lesions  Extremities: slightly contracted, no rt thigh swelling  Neurologic: awake, marginally interactive    LAB RESULTS:                        10.8   13.26 )-----------( 422      ( 21 Dec 2018 08:33 )             33.8     12-    140  |  99  |  78<H>  ----------------------------<  132<H>  4.1   |  24  |  0.70    Ca    10.2      21 Dec 2018 06:38        Urinalysis Basic - ( 20 Dec 2018 00:18 )    Color: Yellow / Appearance: Slightly Turbid / S.017 / pH: x  Gluc: x / Ketone: Negative  / Bili: Negative / Urobili: Negative   Blood: x / Protein: 30 mg/dL / Nitrite: Negative   Leuk Esterase: Large / RBC: 23 /hpf /  /hpf   Sq Epi: x / Non Sq Epi: 0 /hpf / Bacteria: Negative      MICROBIOLOGY:  RECENT CULTURES:   @ 23:45 .Blood Blood-Peripheral     No growth to date.          RADIOLOGY REVIEWED:    < from: Xray Chest 1 View- PORTABLE-Urgent (18 @ 01:58) >  INTERPRETATION:  A single chest x-ray was obtained on 2018.    Indication: Fever.    Impression:    The heart is slightly enlarged. The lungs appear to be clear. No   radiographic evidence for congestive heart failure or pneumonia.    < end of copied text >

## 2018-12-21 NOTE — PROGRESS NOTE ADULT - ASSESSMENT
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  The differential of fever includes infection,malignancy as well as inflammatory fever.  Known DVT in leg.  No clear source of infection  Plan:   continue supportive care as per primary medical team   Await repeat blood cultures, at this point with no identified infection will monitor conservatively  My bias would be to support her medically and await results of blood cultures  If we felt obligated we could give a dose of vanco and amikacin along with micafungin but I believe we should keep her hydrated, try to optimize  nutrition, and avoid empiric antimicrobials  We could consider repeat CT imaging of A/P to look for occult abscess  [place] guarded

## 2018-12-21 NOTE — PROGRESS NOTE ADULT - ASSESSMENT
fever   dvt   chf   dementia     will give normal saline 25 cc per hour 250 cc bag to rehydrate slowly

## 2018-12-21 NOTE — PROGRESS NOTE ADULT - SUBJECTIVE AND OBJECTIVE BOX
---___---___---___---___---___---___ ---___---___---___---___---___---___---___---___---___---                    <<<  M E D I C A L   A T T E N D I N G    F O L L O W    U P   N O T E  >>>    - Patient seen and examined by me approximately thirty minutes ago.  - In summary, patient is a 68y year old Female who origianlly presented with elevated bnp and now with dvt and  with contoiue ous fevers that have slowly subsided  but still present   -    ---___---___---___---___---___---      <<<  MEDICATIONS:  >>>    MEDICATIONS  (STANDING):  ALBUTerol/ipratropium for Nebulization 3 milliLiter(s) Nebulizer every 6 hours  apixaban 10 milliGRAM(s) Oral every 12 hours  AQUAPHOR (petrolatum Ointment) 1 Application(s) Topical three times a day  buDESOnide 160 MICROgram(s)/formoterol 4.5 MICROgram(s) Inhaler 2 Puff(s) Inhalation two times a day  clobetasol 0.05% Ointment 1 Application(s) Topical two times a day  clonazePAM Tablet 0.5 milliGRAM(s) Oral at bedtime  dextrose 5%. 1000 milliLiter(s) (50 mL/Hr) IV Continuous <Continuous>  dextrose 50% Injectable 12.5 Gram(s) IV Push once  dextrose 50% Injectable 25 Gram(s) IV Push once  dextrose 50% Injectable 25 Gram(s) IV Push once  fentaNYL   Patch  12 MICROgram(s)/Hr 1 Patch Transdermal every 72 hours  ferrous    sulfate Liquid 300 milliGRAM(s) Enteral Tube daily  gabapentin   Solution 300 milliGRAM(s) Oral two times a day  insulin lispro (HumaLOG) corrective regimen sliding scale   SubCutaneous every 6 hours  medical marijuana oil 1 milliLiter(s),medical marijuana oil 1 milliLiter(s) 1 milliLiter(s) SubLingual <User Schedule>  minocycline 100 milliGRAM(s) Oral two times a day  mirtazapine 7.5 milliGRAM(s) Oral at bedtime  pantoprazole   Suspension 40 milliGRAM(s) Oral before breakfast  predniSONE   Tablet 7.5 milliGRAM(s) Oral <User Schedule>  QUEtiapine 25 milliGRAM(s) Oral at bedtime  sertraline 50 milliGRAM(s) Oral daily  sodium chloride 0.9% Bolus 250 milliLiter(s) IV Bolus once  tiotropium 18 MICROgram(s) Capsule 1 Capsule(s) Inhalation daily  tiZANidine 4 milliGRAM(s) Oral <User Schedule>      MEDICATIONS  (PRN):  acetaminophen    Suspension .. 680 milliGRAM(s) Oral every 6 hours PRN Temp greater or equal to 38C (100.4F), Mild Pain (1 - 3)  dextrose 40% Gel 15 Gram(s) Oral once PRN Blood Glucose LESS THAN 70 milliGRAM(s)/deciliter  glucagon  Injectable 1 milliGRAM(s) IntraMuscular once PRN Glucose LESS THAN 70 milligrams/deciliter  nystatin Powder 1 Application(s) Topical two times a day PRN rash/itchiness       ---___---___---___---___---___---     <<<REVIEW OF SYSTEM: >>>          ---___---___---___---___---___---          <<<  VITAL SIGNS: >>>    T(F): 100.3 (18 @ 11:05), Max: 101.7 (18 @ 22:23)  HR: 95 (18 @ 11:05) (94 - 106)  BP: 123/63 (18 @ 11:05) (96/58 - 141/83)  RR: 17 (18 @ 11:05) (17 - 19)  SpO2: 98% (18 @ 11:05) (93% - 98%)  Wt(kg): --  CAPILLARY BLOOD GLUCOSE      POCT Blood Glucose.: 147 mg/dL (21 Dec 2018 05:58)    I&O's Summary    20 Dec 2018 07:01  -  21 Dec 2018 07:00  --------------------------------------------------------  IN: 0 mL / OUT: 850 mL / NET: -850 mL         ---___---___---___---___---___---                       PHYSICAL EXAM:    GEN: A&O X 1 , NAD , comfortable  HEENT: NCAT, PERRL, MMM, no scleral icterus, hearing intact  NECK: Supple, No JVD  CVS: S1S2 , regular , No M/R/G appreciated  PULM: CTA B/L,  no W/R/R appreciated    ABD.: soft. non tender, non distended,  bowel sounds present peg noted   Extrem: intact pulses , no edema noted  Derm: No rash or ecchymosis noted sacral decubitus noted         ---___---___---___---___---___---     <<<  LAB AND IMAGING: >>>                          10.8   13.26 )-----------( 422      ( 21 Dec 2018 08:33 )             33.8               12-21    140  |  99  |  78<H>  ----------------------------<  132<H>  4.1   |  24  |  0.70    Ca    10.2      21 Dec 2018 06:38      PT/INR - ( 20 Dec 2018 05:12 )   PT: 15.9 sec;   INR: 1.38 ratio         PTT - ( 20 Dec 2018 05:12 )  PTT:31.0 sec       Urinalysis Basic - ( 20 Dec 2018 00:18 )    Color: Yellow / Appearance: Slightly Turbid / S.017 / pH: x  Gluc: x / Ketone: Negative  / Bili: Negative / Urobili: Negative   Blood: x / Protein: 30 mg/dL / Nitrite: Negative   Leuk Esterase: Large / RBC: 23 /hpf /  /hpf   Sq Epi: x / Non Sq Epi: 0 /hpf / Bacteria: Negative                        [All pertinent / recent available Imaging reports and other labs reviewed]     ---___---___---___---___---___---___ ---___---___---___---___---           <<<  A S S E S S M E N T   A N D   P L A N :  >>>          -GI/DVT Prophylaxis.    --------------------------------------------  Case discussed with   Education given on     >>______________________<<      Deniz Bolden .         phone   [phone]

## 2018-12-22 LAB
ALBUMIN SERPL ELPH-MCNC: 3.9 G/DL — SIGNIFICANT CHANGE UP (ref 3.3–5)
ALP SERPL-CCNC: 96 U/L — SIGNIFICANT CHANGE UP (ref 40–120)
ALT FLD-CCNC: 18 U/L — SIGNIFICANT CHANGE UP (ref 10–45)
ANION GAP SERPL CALC-SCNC: 16 MMOL/L — SIGNIFICANT CHANGE UP (ref 5–17)
AST SERPL-CCNC: 23 U/L — SIGNIFICANT CHANGE UP (ref 10–40)
BASOPHILS # BLD AUTO: 0.04 K/UL — SIGNIFICANT CHANGE UP (ref 0–0.2)
BASOPHILS NFR BLD AUTO: 0.3 % — SIGNIFICANT CHANGE UP (ref 0–2)
BILIRUB SERPL-MCNC: 0.3 MG/DL — SIGNIFICANT CHANGE UP (ref 0.2–1.2)
BUN SERPL-MCNC: 67 MG/DL — HIGH (ref 7–23)
CALCIUM SERPL-MCNC: 10.1 MG/DL — SIGNIFICANT CHANGE UP (ref 8.4–10.5)
CHLORIDE SERPL-SCNC: 100 MMOL/L — SIGNIFICANT CHANGE UP (ref 96–108)
CO2 SERPL-SCNC: 23 MMOL/L — SIGNIFICANT CHANGE UP (ref 22–31)
CREAT SERPL-MCNC: 0.65 MG/DL — SIGNIFICANT CHANGE UP (ref 0.5–1.3)
EOSINOPHIL # BLD AUTO: 0.96 K/UL — HIGH (ref 0–0.5)
EOSINOPHIL NFR BLD AUTO: 7.8 % — HIGH (ref 0–6)
GLUCOSE BLDC GLUCOMTR-MCNC: 113 MG/DL — HIGH (ref 70–99)
GLUCOSE BLDC GLUCOMTR-MCNC: 144 MG/DL — HIGH (ref 70–99)
GLUCOSE BLDC GLUCOMTR-MCNC: 188 MG/DL — HIGH (ref 70–99)
GLUCOSE BLDC GLUCOMTR-MCNC: 206 MG/DL — HIGH (ref 70–99)
GLUCOSE SERPL-MCNC: 104 MG/DL — HIGH (ref 70–99)
HCT VFR BLD CALC: 33.4 % — LOW (ref 34.5–45)
HGB BLD-MCNC: 10.5 G/DL — LOW (ref 11.5–15.5)
IMM GRANULOCYTES NFR BLD AUTO: 0.2 % — SIGNIFICANT CHANGE UP (ref 0–1.5)
LYMPHOCYTES # BLD AUTO: 1.64 K/UL — SIGNIFICANT CHANGE UP (ref 1–3.3)
LYMPHOCYTES # BLD AUTO: 13.3 % — SIGNIFICANT CHANGE UP (ref 13–44)
MCHC RBC-ENTMCNC: 30.3 PG — SIGNIFICANT CHANGE UP (ref 27–34)
MCHC RBC-ENTMCNC: 31.4 GM/DL — LOW (ref 32–36)
MCV RBC AUTO: 96.3 FL — SIGNIFICANT CHANGE UP (ref 80–100)
MONOCYTES # BLD AUTO: 1.17 K/UL — HIGH (ref 0–0.9)
MONOCYTES NFR BLD AUTO: 9.5 % — SIGNIFICANT CHANGE UP (ref 2–14)
NEUTROPHILS # BLD AUTO: 8.53 K/UL — HIGH (ref 1.8–7.4)
NEUTROPHILS NFR BLD AUTO: 68.9 % — SIGNIFICANT CHANGE UP (ref 43–77)
PLATELET # BLD AUTO: 446 K/UL — HIGH (ref 150–400)
POTASSIUM SERPL-MCNC: 4.3 MMOL/L — SIGNIFICANT CHANGE UP (ref 3.5–5.3)
POTASSIUM SERPL-SCNC: 4.3 MMOL/L — SIGNIFICANT CHANGE UP (ref 3.5–5.3)
PROT SERPL-MCNC: 6.7 G/DL — SIGNIFICANT CHANGE UP (ref 6–8.3)
RBC # BLD: 3.47 M/UL — LOW (ref 3.8–5.2)
RBC # FLD: 15.9 % — HIGH (ref 10.3–14.5)
SODIUM SERPL-SCNC: 139 MMOL/L — SIGNIFICANT CHANGE UP (ref 135–145)
WBC # BLD: 12.37 K/UL — HIGH (ref 3.8–10.5)
WBC # FLD AUTO: 12.37 K/UL — HIGH (ref 3.8–10.5)

## 2018-12-22 PROCEDURE — 74176 CT ABD & PELVIS W/O CONTRAST: CPT | Mod: 26

## 2018-12-22 PROCEDURE — 99233 SBSQ HOSP IP/OBS HIGH 50: CPT

## 2018-12-22 RX ORDER — APIXABAN 2.5 MG/1
5 TABLET, FILM COATED ORAL EVERY 12 HOURS
Qty: 0 | Refills: 0 | Status: DISCONTINUED | OUTPATIENT
Start: 2018-12-22 | End: 2019-01-16

## 2018-12-22 RX ORDER — FENTANYL CITRATE 50 UG/ML
1 INJECTION INTRAVENOUS
Qty: 0 | Refills: 0 | Status: DISCONTINUED | OUTPATIENT
Start: 2018-12-22 | End: 2018-12-27

## 2018-12-22 RX ORDER — SODIUM CHLORIDE 9 MG/ML
250 INJECTION INTRAMUSCULAR; INTRAVENOUS; SUBCUTANEOUS
Qty: 0 | Refills: 0 | Status: DISCONTINUED | OUTPATIENT
Start: 2018-12-22 | End: 2019-01-08

## 2018-12-22 RX ADMIN — TIZANIDINE 4 MILLIGRAM(S): 4 TABLET ORAL at 09:06

## 2018-12-22 RX ADMIN — Medication 7.5 MILLIGRAM(S): at 09:06

## 2018-12-22 RX ADMIN — APIXABAN 5 MILLIGRAM(S): 2.5 TABLET, FILM COATED ORAL at 17:47

## 2018-12-22 RX ADMIN — Medication 680 MILLIGRAM(S): at 00:51

## 2018-12-22 RX ADMIN — Medication 3 MILLILITER(S): at 11:49

## 2018-12-22 RX ADMIN — TIZANIDINE 4 MILLIGRAM(S): 4 TABLET ORAL at 21:09

## 2018-12-22 RX ADMIN — QUETIAPINE FUMARATE 25 MILLIGRAM(S): 200 TABLET, FILM COATED ORAL at 22:32

## 2018-12-22 RX ADMIN — APIXABAN 10 MILLIGRAM(S): 2.5 TABLET, FILM COATED ORAL at 06:15

## 2018-12-22 RX ADMIN — Medication 680 MILLIGRAM(S): at 07:29

## 2018-12-22 RX ADMIN — Medication 3 MILLILITER(S): at 17:46

## 2018-12-22 RX ADMIN — TIOTROPIUM BROMIDE 1 CAPSULE(S): 18 CAPSULE ORAL; RESPIRATORY (INHALATION) at 11:49

## 2018-12-22 RX ADMIN — Medication 300 MILLIGRAM(S): at 11:49

## 2018-12-22 RX ADMIN — Medication 1: at 17:47

## 2018-12-22 RX ADMIN — PANTOPRAZOLE SODIUM 40 MILLIGRAM(S): 20 TABLET, DELAYED RELEASE ORAL at 06:13

## 2018-12-22 RX ADMIN — Medication 1 APPLICATION(S): at 06:14

## 2018-12-22 RX ADMIN — Medication 1 APPLICATION(S): at 12:18

## 2018-12-22 RX ADMIN — BUDESONIDE AND FORMOTEROL FUMARATE DIHYDRATE 2 PUFF(S): 160; 4.5 AEROSOL RESPIRATORY (INHALATION) at 17:47

## 2018-12-22 RX ADMIN — Medication 680 MILLIGRAM(S): at 01:21

## 2018-12-22 RX ADMIN — Medication 0.5 MILLIGRAM(S): at 22:32

## 2018-12-22 RX ADMIN — FENTANYL CITRATE 1 PATCH: 50 INJECTION INTRAVENOUS at 07:00

## 2018-12-22 RX ADMIN — GABAPENTIN 300 MILLIGRAM(S): 400 CAPSULE ORAL at 17:56

## 2018-12-22 RX ADMIN — BUDESONIDE AND FORMOTEROL FUMARATE DIHYDRATE 2 PUFF(S): 160; 4.5 AEROSOL RESPIRATORY (INHALATION) at 06:14

## 2018-12-22 RX ADMIN — GABAPENTIN 300 MILLIGRAM(S): 400 CAPSULE ORAL at 06:15

## 2018-12-22 RX ADMIN — MIRTAZAPINE 7.5 MILLIGRAM(S): 45 TABLET, ORALLY DISINTEGRATING ORAL at 22:32

## 2018-12-22 RX ADMIN — FENTANYL CITRATE 1 PATCH: 50 INJECTION INTRAVENOUS at 19:04

## 2018-12-22 RX ADMIN — Medication 3 MILLILITER(S): at 06:15

## 2018-12-22 RX ADMIN — SERTRALINE 50 MILLIGRAM(S): 25 TABLET, FILM COATED ORAL at 11:49

## 2018-12-22 RX ADMIN — Medication 2: at 12:34

## 2018-12-22 RX ADMIN — MINOCYCLINE HYDROCHLORIDE 100 MILLIGRAM(S): 45 TABLET, EXTENDED RELEASE ORAL at 17:47

## 2018-12-22 RX ADMIN — Medication 680 MILLIGRAM(S): at 06:59

## 2018-12-22 RX ADMIN — Medication 1 APPLICATION(S): at 22:34

## 2018-12-22 RX ADMIN — MINOCYCLINE HYDROCHLORIDE 100 MILLIGRAM(S): 45 TABLET, EXTENDED RELEASE ORAL at 06:13

## 2018-12-22 RX ADMIN — SODIUM CHLORIDE 25 MILLILITER(S): 9 INJECTION INTRAMUSCULAR; INTRAVENOUS; SUBCUTANEOUS at 11:50

## 2018-12-22 NOTE — PROGRESS NOTE ADULT - ASSESSMENT
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  The differential of fever includes infection,malignancy as well as inflammatory fever.  Known DVT in leg.  No clear source of infection  reviewed CT A/P read no acute pathology seen   her repeat blood cx are no growth to date   No active infection found to date   Her  fevers may be explained as inflammatory secondary to known dvt RLE   Plan:   Monitor off antibiotics

## 2018-12-22 NOTE — PROGRESS NOTE ADULT - SUBJECTIVE AND OBJECTIVE BOX
CC: f/u for fever    Patient has no complaints she is alert and wake in bed     REVIEW OF SYSTEMS:  All other review of systems negative (Comprehensive ROS)    Other Medications Reviewed  Vital Signs Last 24 Hrs  T(C): 37.4 (22 Dec 2018 12:15), Max: 38.3 (22 Dec 2018 06:02)  T(F): 99.4 (22 Dec 2018 12:15), Max: 101 (22 Dec 2018 06:02)  HR: 92 (22 Dec 2018 11:29) (91 - 96)  BP: 127/75 (22 Dec 2018 11:29) (97/57 - 134/71)  BP(mean): --  RR: 18 (22 Dec 2018 11:29) (17 - 18)  SpO2: 96% (22 Dec 2018 11:29) (94% - 98%)    PHYSICAL EXAM:  General: alert, no acute distress, chronically ill appearing  Eyes:  anicteric, no conjunctival injection, no discharge  Oropharynx: no lesions or injection 	  Neck: supple, without adenopathy  Lungs: clear to auscultation, poor inspiratory effert  Heart: regular rate and rhythm; no murmur, rubs or gallops  Abdomen: soft, nondistended, nontender, without mass or organomegaly  Skin: no lesions  Extremities: slightly contracted, no rt thigh swelling  Neurologic: awake, marginally interactive    LAB RESULTS:                        10.8   13.26 )-----------( 422      ( 21 Dec 2018 08:33 )             33.8     12-    140  |  99  |  78<H>  ----------------------------<  132<H>  4.1   |  24  |  0.70    Ca    10.2      21 Dec 2018 06:38        Urinalysis Basic - ( 20 Dec 2018 00:18 )    Color: Yellow / Appearance: Slightly Turbid / S.017 / pH: x  Gluc: x / Ketone: Negative  / Bili: Negative / Urobili: Negative   Blood: x / Protein: 30 mg/dL / Nitrite: Negative   Leuk Esterase: Large / RBC: 23 /hpf /  /hpf   Sq Epi: x / Non Sq Epi: 0 /hpf / Bacteria: Negative      MICROBIOLOGY:  RECENT CULTURES:   @ 23:45 .Blood Blood-Peripheral     No growth to date.          RADIOLOGY REVIEWED:    < from: Xray Chest 1 View- PORTABLE-Urgent (18 @ 01:58) >  INTERPRETATION:  A single chest x-ray was obtained on 2018.    Indication: Fever.    Impression:    The heart is slightly enlarged. The lungs appear to be clear. No   radiographic evidence for congestive heart failure or pneumonia.    < end of copied text >    < from: CT Abdomen and Pelvis No Cont (18 @ 15:14) >    IMPRESSION:    No acute intra-abdominal pathology or collection.    < end of copied text >    < from: [facility] Duplex Lower Ext Vein Scan, Bilat (12.15.18 @ 14:01) >    Acute, above the knee deep venous thrombosis extending from the right   common femoral vein through the popliteal vein and into the right   gastrocnemius vein.    < end of copied text >

## 2018-12-22 NOTE — PROGRESS NOTE ADULT - ASSESSMENT
68 year-old woman with Alzheimer's dementia seen to have volume overload and biventricular congestive heart failure.  TTE on this admission shows reduced LVEF, elevated pulmonary pressures.    Given severely reduced LVEF, plan to continue ACEi and uptitrate as blood pressure permits. Currently on hold in light of hypotension.  Will defer starting beta-blocker in this patient as she has baseline restrictive airway disease and is beta-agonist inhalers. Can reconsider low dose carvedilol as outpatient.    Patient with recurrent fevers - follow-up ID recommendations for antibiotics (currently being treated for MDR pseudomonas).  RLE DVT could be etiology of fevers.    Continue anticoagulation with apixaban 5mg BID for patient with age < 80 years and creatinine < 1.5 mg/dL.    ID input appreciated - follow-up CT.

## 2018-12-22 NOTE — PROGRESS NOTE ADULT - SUBJECTIVE AND OBJECTIVE BOX
HPI:  Continues to have fevers.  More awake and alert today.    Review Of Systems:     Unable to obtain reliable review of systems due to dementia    Medications:  acetaminophen    Suspension .. 680 milliGRAM(s) Oral every 6 hours PRN  ALBUTerol/ipratropium for Nebulization 3 milliLiter(s) Nebulizer every 6 hours  apixaban 5 milliGRAM(s) Oral every 12 hours  AQUAPHOR (petrolatum Ointment) 1 Application(s) Topical three times a day  buDESOnide 160 MICROgram(s)/formoterol 4.5 MICROgram(s) Inhaler 2 Puff(s) Inhalation two times a day  clobetasol 0.05% Ointment 1 Application(s) Topical two times a day  clonazePAM Tablet 0.5 milliGRAM(s) Oral at bedtime  dextrose 40% Gel 15 Gram(s) Oral once PRN  dextrose 5%. 1000 milliLiter(s) IV Continuous <Continuous>  dextrose 50% Injectable 12.5 Gram(s) IV Push once  dextrose 50% Injectable 25 Gram(s) IV Push once  dextrose 50% Injectable 25 Gram(s) IV Push once  fentaNYL   Patch  12 MICROgram(s)/Hr 1 Patch Transdermal every 72 hours  ferrous    sulfate Liquid 300 milliGRAM(s) Enteral Tube daily  gabapentin   Solution 300 milliGRAM(s) Oral two times a day  glucagon  Injectable 1 milliGRAM(s) IntraMuscular once PRN  insulin lispro (HumaLOG) corrective regimen sliding scale   SubCutaneous every 6 hours  medical marijuana oil 1 milliLiter(s),medical marijuana oil 1 milliLiter(s) 1 milliLiter(s) SubLingual <User Schedule>  minocycline 100 milliGRAM(s) Oral two times a day  mirtazapine 7.5 milliGRAM(s) Oral at bedtime  nystatin Powder 1 Application(s) Topical two times a day PRN  pantoprazole   Suspension 40 milliGRAM(s) Oral before breakfast  predniSONE   Tablet 7.5 milliGRAM(s) Oral <User Schedule>  QUEtiapine 25 milliGRAM(s) Oral at bedtime  sertraline 50 milliGRAM(s) Oral daily  sodium chloride 0.9% Bolus 250 milliLiter(s) IV Bolus once  sodium chloride 0.9%. 250 milliLiter(s) IV Continuous <Continuous>  tiotropium 18 MICROgram(s) Capsule 1 Capsule(s) Inhalation daily  tiZANidine 4 milliGRAM(s) Oral <User Schedule>    PAST MEDICAL & SURGICAL HISTORY:  CVA (cerebral vascular accident)  Fatty pancreas  PNA (pneumonia)  Pulmonary HTN  IGT (impaired glucose tolerance)  Ulcerative colitis  Acid reflux  Anxiety  Depression  Mouth sores  HLD (hyperlipidemia)  Asthma  Humeral head fracture  H/O: hysterectomy  H/O cataract extraction, left  History of knee replacement    Vitals:  T(C): 37.4 (12-22-18 @ 12:15), Max: 38.3 (12-22-18 @ 06:02)  HR: 92 (12-22-18 @ 11:29) (91 - 96)  BP: 127/75 (12-22-18 @ 11:29) (97/57 - 134/71)  BP(mean): --  RR: 18 (12-22-18 @ 11:29) (17 - 18)  SpO2: 96% (12-22-18 @ 11:29) (94% - 98%)  Wt(kg): --  Daily     Daily   I&O's Summary    21 Dec 2018 07:01  -  22 Dec 2018 07:00  --------------------------------------------------------  IN: 910 mL / OUT: 1300 mL / NET: -390 mL    22 Dec 2018 07:01  -  22 Dec 2018 16:29  --------------------------------------------------------  IN: 0 mL / OUT: 400 mL / NET: -400 mL        Physical Exam:  Appearance: appears older than stated age; bedbound, demented; no acute distress  Eyes: PERRL, EOMI, pink conjunctiva  HENT: Normal oral mucosa  Cardiovascular: RRR, S1, S2; no LE edema bilaterally; normal JVP  Respiratory: poor inspiratory effort  Gastrointestinal: soft, non-tender, non-distended with normal bowel sounds; +PEG  Musculoskeletal: Bedbound, contracted  Neurologic: cranial nerves grossly intact  Lymphatic: No lymphadenopathy  Psychiatry: awake and alert  Skin: No rashes, ecchymoses, or cyanosis                          10.5   12.37 )-----------( 446      ( 22 Dec 2018 07:48 )             33.4     12-22    139  |  100  |  67<H>  ----------------------------<  104<H>  4.3   |  23  |  0.65    Ca    10.1      22 Dec 2018 06:30    TPro  6.7  /  Alb  3.9  /  TBili  0.3  /  DBili  x   /  AST  23  /  ALT  18  /  AlkPhos  96  12-22            Echo: < from: Transthoracic Echocardiogram (12.03.18 @ 11:23) >  ------------------------------------------------------------------------  Conclusions:  1. Mitral annular calcification, otherwise normal mitral  valve. Moderate-severe mitral regurgitation (vena contracta  0.8 cm)  2. Calcified trileafletaortic valve with normal opening.  Peak transaortic valve gradient equals 3 mm Hg, mean  transaortic valve gradient equals 1 mm Hg, aortic valve  velocity time integral equals 13 cm. Moderate aortic  regurgitation  3. Moderately dilated left atrium.LA volume index = 45  cc/m2.  4. Severe global left ventricular systolic dysfunction.  5. Increased E/e'  is consistent with elevated left  ventricular filling pressure.  6. Moderate right atrial enlargement. Inferior vena cava is  dilated (>=2.5cm) with normal respiratory variability  consistent with right atrial pressure 16-20mm Hg.  7. Right ventricular enlargement with decreased right  ventricular systolic function.  8. Normal tricuspid valve. Severe tricuspid regurgitation.  9. Estimated pulmonary artery systolic pressure equals 82  mm Hg, assuming right atrial pressure equals 8 mm Hg,  consistent with severe pulmonary pressures.  10. Color Doppler demonstrates evidence of left to right  shunt  ------------------------------------------------------------------------  Confirmed on  12/3/2018 - 16:37:07 by Margaret Khan M.D.  ------------------------------------------------------------------------    < end of copied text >    Imaging: < from: Xray Chest 1 View- PORTABLE-Urgent (12.02.18 @ 17:19) >  IMPRESSION:   Limited study due to patient positioning.  Trace right pleural effusion and likely left pleural effusion. Question   mild pulmonary edema.    HUAN JACOBS M.D., RADIOLOGY RESIDENT  This document has been electronically signed.  TONYA AARON M.D., ATTENDING RADIOLOGIST  This document has been electronically signed. Dec  2 2018  9:17PM      < end of copied text >    < from: [facility] Duplex Lower Ext Vein Scan, Bilat (12.15.18 @ 14:01) >  IMPRESSION:     Acute, above the knee deep venous thrombosis extending from the right   common femoral vein through the popliteal vein and into the right   gastrocnemius vein.    No left lower extremity DVT.    The above findings were discussed with BENJI Ryan by Dr. Tapia on   12/15/2018 2:43 PM, with read-back verification.    YELENA TAPIA M.D., RADIOLOGY RESIDENT  This document has been electronically signed.  PRIMO MURILLO M.D., ATTENDING RADIOLOGIST  This document has been electronically signed. Dec 15 2018  3:55PM        < end of copied text >    < from: NM Pulmonary Ventilation/Perfusion Scan (12.15.18 @ 10:13) >  FINDINGS: There is heterogeneous distribution of radiopharmaceutical in   both lungs on the ventilation and perfusion images. There are no   segmental perfusion defects in either lung. No significant interval   change since the previous study of 12/3/2018.    IMPRESSION: Very low probability of pulmonary embolus.    KRYSTYNA JULIAN M.D., CHIEF OF NUCLEAR MEDICINE  This document has been electronically signed. Dec 15 2018 10:15AM    < end of copied text >    Interpretation of Telemetry: Patient taken off tele

## 2018-12-22 NOTE — PROGRESS NOTE ADULT - SUBJECTIVE AND OBJECTIVE BOX
---___---___---___---___---___---___ ---___---___---___---___---___---___---___---___---___---                    <<<  M E D I C A L   A T T E N D I N G    F O L L O W    U P   N O T E  >>>    - Patient seen and examined by me approximately thirty minutes ago.  - In summary, patient is a 68y year old Female who origianlly presented with chf anf now with dvt and recurrent fever despite being on antibiotics, id and cardiology following       ---___---___---___---___---___---      <<<  MEDICATIONS:  >>>    MEDICATIONS  (STANDING):  ALBUTerol/ipratropium for Nebulization 3 milliLiter(s) Nebulizer every 6 hours  apixaban 5 milliGRAM(s) Oral every 12 hours  AQUAPHOR (petrolatum Ointment) 1 Application(s) Topical three times a day  buDESOnide 160 MICROgram(s)/formoterol 4.5 MICROgram(s) Inhaler 2 Puff(s) Inhalation two times a day  clobetasol 0.05% Ointment 1 Application(s) Topical two times a day  clonazePAM Tablet 0.5 milliGRAM(s) Oral at bedtime  dextrose 5%. 1000 milliLiter(s) (50 mL/Hr) IV Continuous <Continuous>  dextrose 50% Injectable 12.5 Gram(s) IV Push once  dextrose 50% Injectable 25 Gram(s) IV Push once  dextrose 50% Injectable 25 Gram(s) IV Push once  fentaNYL   Patch  12 MICROgram(s)/Hr 1 Patch Transdermal every 72 hours  ferrous    sulfate Liquid 300 milliGRAM(s) Enteral Tube daily  gabapentin   Solution 300 milliGRAM(s) Oral two times a day  insulin lispro (HumaLOG) corrective regimen sliding scale   SubCutaneous every 6 hours  medical marijuana oil 1 milliLiter(s),medical marijuana oil 1 milliLiter(s) 1 milliLiter(s) SubLingual <User Schedule>  minocycline 100 milliGRAM(s) Oral two times a day  mirtazapine 7.5 milliGRAM(s) Oral at bedtime  pantoprazole   Suspension 40 milliGRAM(s) Oral before breakfast  predniSONE   Tablet 7.5 milliGRAM(s) Oral <User Schedule>  QUEtiapine 25 milliGRAM(s) Oral at bedtime  sertraline 50 milliGRAM(s) Oral daily  sodium chloride 0.9% Bolus 250 milliLiter(s) IV Bolus once  sodium chloride 0.9%. 250 milliLiter(s) (25 mL/Hr) IV Continuous <Continuous>  tiotropium 18 MICROgram(s) Capsule 1 Capsule(s) Inhalation daily  tiZANidine 4 milliGRAM(s) Oral <User Schedule>      MEDICATIONS  (PRN):  acetaminophen    Suspension .. 680 milliGRAM(s) Oral every 6 hours PRN Temp greater or equal to 38C (100.4F), Mild Pain (1 - 3)  dextrose 40% Gel 15 Gram(s) Oral once PRN Blood Glucose LESS THAN 70 milliGRAM(s)/deciliter  glucagon  Injectable 1 milliGRAM(s) IntraMuscular once PRN Glucose LESS THAN 70 milligrams/deciliter  nystatin Powder 1 Application(s) Topical two times a day PRN rash/itchiness       ---___---___---___---___---___---     <<<REVIEW OF SYSTEM: >>>       ---___---___---___---___---___---          <<<  VITAL SIGNS: >>>    T(F): 101 (12-22-18 @ 06:02), Max: 101 (12-22-18 @ 06:02)  HR: 91 (12-22-18 @ 06:02) (91 - 96)  BP: 134/71 (12-22-18 @ 06:02) (97/57 - 134/71)  RR: 18 (12-22-18 @ 06:02) (17 - 18)  SpO2: 94% (12-22-18 @ 06:02) (94% - 98%)  Wt(kg): --  CAPILLARY BLOOD GLUCOSE      POCT Blood Glucose.: 113 mg/dL (22 Dec 2018 06:00)    I&O's Summary    21 Dec 2018 07:01  -  22 Dec 2018 07:00  --------------------------------------------------------  IN: 910 mL / OUT: 1300 mL / NET: -390 mL         ---___---___---___---___---___---                       PHYSICAL EXAM:    GEN: A&O X , NAD , contracted   HEENT: NCAT, PERRL, MMM, no scleral icterus, hearing intact  NECK: Supple, No JVD  CVS: S1S2 , regular , No M/R/G appreciated  PULM: CTA B/L,  no W/R/R appreciated  ABD.: soft. non tender, non distended,  bowel sounds present peg noted  Extrem: intact pulses , no edema noted contracted   Derm: No rash or ecchymosis noted sacral decubitus noted , wound to right leg noted         ---___---___---___---___---___---     <<<  LAB AND IMAGING: >>>                          10.5   12.37 )-----------( 446      ( 22 Dec 2018 07:48 )             33.4               12-22    139  |  100  |  67<H>  ----------------------------<  104<H>  4.3   |  23  |  0.65    Ca    10.1      22 Dec 2018 06:30    TPro  6.7  /  Alb  3.9  /  TBili  0.3  /  DBili  x   /  AST  23  /  ALT  18  /  AlkPhos  96  12-22                               [All pertinent / recent available Imaging reports and other labs reviewed]     ---___---___---___---___---___---___ ---___---___---___---___---           <<<  A S S E S S M E N T   A N D   P L A N :  >>>          -GI/DVT Prophylaxis.    --------------------------------------------  Case discussed with   Education given on     >>______________________<<      Deniz Bolden .         phone   [phone]

## 2018-12-22 NOTE — PROGRESS NOTE ADULT - ASSESSMENT
recurrent fever   dvt  htn   asthma   dementia / anxiety on remeron zoloft and klonopin   chroinic pain    sacral decubitus continue care per wound team

## 2018-12-23 LAB
ANION GAP SERPL CALC-SCNC: 17 MMOL/L — SIGNIFICANT CHANGE UP (ref 5–17)
APPEARANCE UR: ABNORMAL
BACTERIA # UR AUTO: ABNORMAL
BASOPHILS # BLD AUTO: 0.04 K/UL — SIGNIFICANT CHANGE UP (ref 0–0.2)
BASOPHILS NFR BLD AUTO: 0.3 % — SIGNIFICANT CHANGE UP (ref 0–2)
BILIRUB UR-MCNC: NEGATIVE — SIGNIFICANT CHANGE UP
BUN SERPL-MCNC: 60 MG/DL — HIGH (ref 7–23)
CALCIUM SERPL-MCNC: 9.9 MG/DL — SIGNIFICANT CHANGE UP (ref 8.4–10.5)
CHLORIDE SERPL-SCNC: 102 MMOL/L — SIGNIFICANT CHANGE UP (ref 96–108)
CO2 SERPL-SCNC: 22 MMOL/L — SIGNIFICANT CHANGE UP (ref 22–31)
COLOR SPEC: YELLOW — SIGNIFICANT CHANGE UP
COMMENT - URINE: SIGNIFICANT CHANGE UP
CREAT SERPL-MCNC: 0.55 MG/DL — SIGNIFICANT CHANGE UP (ref 0.5–1.3)
CULTURE RESULTS: SIGNIFICANT CHANGE UP
DIFF PNL FLD: ABNORMAL
EOSINOPHIL # BLD AUTO: 0.83 K/UL — HIGH (ref 0–0.5)
EOSINOPHIL NFR BLD AUTO: 7.1 % — HIGH (ref 0–6)
EPI CELLS # UR: 9 /HPF — HIGH (ref 0–5)
GLUCOSE BLDC GLUCOMTR-MCNC: 111 MG/DL — HIGH (ref 70–99)
GLUCOSE BLDC GLUCOMTR-MCNC: 131 MG/DL — HIGH (ref 70–99)
GLUCOSE BLDC GLUCOMTR-MCNC: 136 MG/DL — HIGH (ref 70–99)
GLUCOSE BLDC GLUCOMTR-MCNC: 144 MG/DL — HIGH (ref 70–99)
GLUCOSE SERPL-MCNC: 127 MG/DL — HIGH (ref 70–99)
GLUCOSE UR QL: NEGATIVE MG/DL — SIGNIFICANT CHANGE UP
HCT VFR BLD CALC: 31.3 % — LOW (ref 34.5–45)
HGB BLD-MCNC: 10.1 G/DL — LOW (ref 11.5–15.5)
HYALINE CASTS # UR AUTO: 0 /LPF — SIGNIFICANT CHANGE UP (ref 0–7)
IMM GRANULOCYTES NFR BLD AUTO: 0.3 % — SIGNIFICANT CHANGE UP (ref 0–1.5)
KETONES UR-MCNC: NEGATIVE — SIGNIFICANT CHANGE UP
LEUKOCYTE ESTERASE UR-ACNC: ABNORMAL
LYMPHOCYTES # BLD AUTO: 1.05 K/UL — SIGNIFICANT CHANGE UP (ref 1–3.3)
LYMPHOCYTES # BLD AUTO: 9 % — LOW (ref 13–44)
MCHC RBC-ENTMCNC: 29.8 PG — SIGNIFICANT CHANGE UP (ref 27–34)
MCHC RBC-ENTMCNC: 32.3 GM/DL — SIGNIFICANT CHANGE UP (ref 32–36)
MCV RBC AUTO: 92.3 FL — SIGNIFICANT CHANGE UP (ref 80–100)
MONOCYTES # BLD AUTO: 1.12 K/UL — HIGH (ref 0–0.9)
MONOCYTES NFR BLD AUTO: 9.6 % — SIGNIFICANT CHANGE UP (ref 2–14)
NEUTROPHILS # BLD AUTO: 8.62 K/UL — HIGH (ref 1.8–7.4)
NEUTROPHILS NFR BLD AUTO: 73.7 % — SIGNIFICANT CHANGE UP (ref 43–77)
NITRITE UR-MCNC: NEGATIVE — SIGNIFICANT CHANGE UP
PH UR: 5.5 — SIGNIFICANT CHANGE UP (ref 5–8)
PLATELET # BLD AUTO: 417 K/UL — HIGH (ref 150–400)
POTASSIUM SERPL-MCNC: 4 MMOL/L — SIGNIFICANT CHANGE UP (ref 3.5–5.3)
POTASSIUM SERPL-SCNC: 4 MMOL/L — SIGNIFICANT CHANGE UP (ref 3.5–5.3)
PROT UR-MCNC: 100 MG/DL
RBC # BLD: 3.39 M/UL — LOW (ref 3.8–5.2)
RBC # FLD: 15.7 % — HIGH (ref 10.3–14.5)
RBC CASTS # UR COMP ASSIST: 15 /HPF — HIGH (ref 0–4)
SODIUM SERPL-SCNC: 141 MMOL/L — SIGNIFICANT CHANGE UP (ref 135–145)
SP GR SPEC: 1.02 — SIGNIFICANT CHANGE UP (ref 1.01–1.02)
SPECIMEN SOURCE: SIGNIFICANT CHANGE UP
UROBILINOGEN FLD QL: NEGATIVE MG/DL — SIGNIFICANT CHANGE UP
WBC # BLD: 11.69 K/UL — HIGH (ref 3.8–10.5)
WBC # FLD AUTO: 11.69 K/UL — HIGH (ref 3.8–10.5)
WBC UR QL: >720 /HPF — HIGH (ref 0–5)

## 2018-12-23 RX ORDER — LACTOBACILLUS ACIDOPHILUS 100MM CELL
1 CAPSULE ORAL DAILY
Qty: 0 | Refills: 0 | Status: DISCONTINUED | OUTPATIENT
Start: 2018-12-23 | End: 2019-01-16

## 2018-12-23 RX ADMIN — TIOTROPIUM BROMIDE 1 CAPSULE(S): 18 CAPSULE ORAL; RESPIRATORY (INHALATION) at 11:17

## 2018-12-23 RX ADMIN — PANTOPRAZOLE SODIUM 40 MILLIGRAM(S): 20 TABLET, DELAYED RELEASE ORAL at 06:25

## 2018-12-23 RX ADMIN — Medication 1 APPLICATION(S): at 06:33

## 2018-12-23 RX ADMIN — Medication 300 MILLIGRAM(S): at 11:16

## 2018-12-23 RX ADMIN — QUETIAPINE FUMARATE 25 MILLIGRAM(S): 200 TABLET, FILM COATED ORAL at 21:29

## 2018-12-23 RX ADMIN — SERTRALINE 50 MILLIGRAM(S): 25 TABLET, FILM COATED ORAL at 11:17

## 2018-12-23 RX ADMIN — FENTANYL CITRATE 1 PATCH: 50 INJECTION INTRAVENOUS at 19:48

## 2018-12-23 RX ADMIN — Medication 680 MILLIGRAM(S): at 13:23

## 2018-12-23 RX ADMIN — Medication 7.5 MILLIGRAM(S): at 11:17

## 2018-12-23 RX ADMIN — APIXABAN 5 MILLIGRAM(S): 2.5 TABLET, FILM COATED ORAL at 06:25

## 2018-12-23 RX ADMIN — Medication 680 MILLIGRAM(S): at 06:34

## 2018-12-23 RX ADMIN — MIRTAZAPINE 7.5 MILLIGRAM(S): 45 TABLET, ORALLY DISINTEGRATING ORAL at 21:30

## 2018-12-23 RX ADMIN — Medication 0.5 MILLIGRAM(S): at 21:29

## 2018-12-23 RX ADMIN — GABAPENTIN 300 MILLIGRAM(S): 400 CAPSULE ORAL at 21:33

## 2018-12-23 RX ADMIN — GABAPENTIN 300 MILLIGRAM(S): 400 CAPSULE ORAL at 06:26

## 2018-12-23 RX ADMIN — APIXABAN 5 MILLIGRAM(S): 2.5 TABLET, FILM COATED ORAL at 18:30

## 2018-12-23 RX ADMIN — Medication 1 APPLICATION(S): at 13:24

## 2018-12-23 RX ADMIN — Medication 1 APPLICATION(S): at 21:33

## 2018-12-23 RX ADMIN — BUDESONIDE AND FORMOTEROL FUMARATE DIHYDRATE 2 PUFF(S): 160; 4.5 AEROSOL RESPIRATORY (INHALATION) at 18:31

## 2018-12-23 RX ADMIN — Medication 1 APPLICATION(S): at 18:32

## 2018-12-23 RX ADMIN — TIZANIDINE 4 MILLIGRAM(S): 4 TABLET ORAL at 21:30

## 2018-12-23 RX ADMIN — BUDESONIDE AND FORMOTEROL FUMARATE DIHYDRATE 2 PUFF(S): 160; 4.5 AEROSOL RESPIRATORY (INHALATION) at 06:25

## 2018-12-23 RX ADMIN — FENTANYL CITRATE 1 PATCH: 50 INJECTION INTRAVENOUS at 11:23

## 2018-12-23 RX ADMIN — Medication 3 MILLILITER(S): at 18:30

## 2018-12-23 RX ADMIN — Medication 1 APPLICATION(S): at 06:32

## 2018-12-23 RX ADMIN — Medication 680 MILLIGRAM(S): at 14:03

## 2018-12-23 RX ADMIN — Medication 3 MILLILITER(S): at 06:25

## 2018-12-23 RX ADMIN — TIZANIDINE 4 MILLIGRAM(S): 4 TABLET ORAL at 11:16

## 2018-12-23 RX ADMIN — Medication 3 MILLILITER(S): at 11:17

## 2018-12-23 RX ADMIN — MINOCYCLINE HYDROCHLORIDE 100 MILLIGRAM(S): 45 TABLET, EXTENDED RELEASE ORAL at 06:25

## 2018-12-23 RX ADMIN — Medication 1 TABLET(S): at 18:30

## 2018-12-23 RX ADMIN — MINOCYCLINE HYDROCHLORIDE 100 MILLIGRAM(S): 45 TABLET, EXTENDED RELEASE ORAL at 18:30

## 2018-12-23 NOTE — PROGRESS NOTE ADULT - SUBJECTIVE AND OBJECTIVE BOX
CC: f/u for fever    Patient in bed awake her daughter is at the bedside  daughter reports that her mother has been having loose BM    REVIEW OF SYSTEMS:  All other review of systems negative (Comprehensive ROS)    Vital Signs Last 24 Hrs  T(C): 39.1 (23 Dec 2018 12:15), Max: 39.1 (23 Dec 2018 12:15)  T(F): 102.3 (23 Dec 2018 12:15), Max: 102.3 (23 Dec 2018 12:15)  HR: 102 (23 Dec 2018 11:10) (93 - 105)  BP: 137/75 (23 Dec 2018 11:10) (125/78 - 139/86)  BP(mean): --  RR: 18 (23 Dec 2018 11:10) (18 - 18)  SpO2: 94% (23 Dec 2018 11:10) (94% - 96%)    PHYSICAL EXAM:  General: alert, no acute distress, chronically ill appearing  Eyes:  anicteric, no conjunctival injection, no discharge  Oropharynx: no lesions or injection 	  Neck: supple, without adenopathy  Lungs: clear to auscultation, poor inspiratory effert  Heart: regular rate and rhythm; no murmur, rubs or gallops  Abdomen: soft, nondistended, nontender, without mass or organomegaly  Skin: no lesions  Extremities: slightly contracted, no rt thigh swelling  Neurologic: awake, marginally interactive    LAB RESULTS:                        10.1   11.69 )-----------( 417      ( 23 Dec 2018 08:09 )             31.3                           10.8   13.26 )-----------( 422      ( 21 Dec 2018 08:33 )             33.8     12-    140  |  99  |  78<H>  ----------------------------<  132<H>  4.1   |  24  |  0.70    Ca    10.2      21 Dec 2018 06:38        Urinalysis Basic - ( 20 Dec 2018 00:18 )    Color: Yellow / Appearance: Slightly Turbid / S.017 / pH: x  Gluc: x / Ketone: Negative  / Bili: Negative / Urobili: Negative   Blood: x / Protein: 30 mg/dL / Nitrite: Negative   Leuk Esterase: Large / RBC: 23 /hpf /  /hpf   Sq Epi: x / Non Sq Epi: 0 /hpf / Bacteria: Negative      MICROBIOLOGY:  RECENT CULTURES:   @ 23:45 .Blood Blood-Peripheral     No growth to date.          RADIOLOGY REVIEWED:    < from: Xray Chest 1 View- PORTABLE-Urgent (18 @ 01:58) >  INTERPRETATION:  A single chest x-ray was obtained on 2018.    Indication: Fever.    Impression:    The heart is slightly enlarged. The lungs appear to be clear. No   radiographic evidence for congestive heart failure or pneumonia.    < end of copied text >    < from: CT Abdomen and Pelvis No Cont (18 @ 15:14) >    IMPRESSION:    No acute intra-abdominal pathology or collection.    < end of copied text >    < from: [facility] Duplex Lower Ext Vein Scan, Bilat (12.15.18 @ 14:01) >    Acute, above the knee deep venous thrombosis extending from the right   common femoral vein through the popliteal vein and into the right   gastrocnemius vein.    < end of copied text >

## 2018-12-23 NOTE — PROGRESS NOTE ADULT - SUBJECTIVE AND OBJECTIVE BOX
---___---___---___---___---___---___ ---___---___---___---___---___---___---___---___---___---                    <<<  M E D I C A L   A T T E N D I N G    F O L L O W    U P   N O T E  >>>    - Patient seen and examined by me approximately thirty minutes ago.  - In summary, patient is a 68y year old Female who origianlly presented with chf now with dvt and fever        ---___---___---___---___---___---      <<<  MEDICATIONS:  >>>    MEDICATIONS  (STANDING):  ALBUTerol/ipratropium for Nebulization 3 milliLiter(s) Nebulizer every 6 hours  apixaban 5 milliGRAM(s) Oral every 12 hours  AQUAPHOR (petrolatum Ointment) 1 Application(s) Topical three times a day  buDESOnide 160 MICROgram(s)/formoterol 4.5 MICROgram(s) Inhaler 2 Puff(s) Inhalation two times a day  clobetasol 0.05% Ointment 1 Application(s) Topical two times a day  clonazePAM Tablet 0.5 milliGRAM(s) Oral at bedtime  dextrose 5%. 1000 milliLiter(s) (50 mL/Hr) IV Continuous <Continuous>  dextrose 50% Injectable 12.5 Gram(s) IV Push once  dextrose 50% Injectable 25 Gram(s) IV Push once  dextrose 50% Injectable 25 Gram(s) IV Push once  fentaNYL   Patch  12 MICROgram(s)/Hr 1 Patch Transdermal every 72 hours  ferrous    sulfate Liquid 300 milliGRAM(s) Enteral Tube daily  gabapentin   Solution 300 milliGRAM(s) Oral two times a day  insulin lispro (HumaLOG) corrective regimen sliding scale   SubCutaneous every 6 hours  medical marijuana oil 1 milliLiter(s),medical marijuana oil 1 milliLiter(s) 1 milliLiter(s) SubLingual <User Schedule>  minocycline 100 milliGRAM(s) Oral two times a day  mirtazapine 7.5 milliGRAM(s) Oral at bedtime  pantoprazole   Suspension 40 milliGRAM(s) Oral before breakfast  predniSONE   Tablet 7.5 milliGRAM(s) Oral <User Schedule>  QUEtiapine 25 milliGRAM(s) Oral at bedtime  sertraline 50 milliGRAM(s) Oral daily  sodium chloride 0.9% Bolus 250 milliLiter(s) IV Bolus once  sodium chloride 0.9%. 250 milliLiter(s) (25 mL/Hr) IV Continuous <Continuous>  tiotropium 18 MICROgram(s) Capsule 1 Capsule(s) Inhalation daily  tiZANidine 4 milliGRAM(s) Oral <User Schedule>      MEDICATIONS  (PRN):  acetaminophen    Suspension .. 680 milliGRAM(s) Oral every 6 hours PRN Temp greater or equal to 38C (100.4F), Mild Pain (1 - 3)  dextrose 40% Gel 15 Gram(s) Oral once PRN Blood Glucose LESS THAN 70 milliGRAM(s)/deciliter  glucagon  Injectable 1 milliGRAM(s) IntraMuscular once PRN Glucose LESS THAN 70 milligrams/deciliter  nystatin Powder 1 Application(s) Topical two times a day PRN rash/itchiness       ---___---___---___---___---___---     <<<REVIEW OF SYSTEM: >>>  unable to obtain      ---___---___---___---___---___---          <<<  VITAL SIGNS: >>>    T(F): 100.8 (12-23-18 @ 06:20), Max: 100.8 (12-23-18 @ 06:20)  HR: 93 (12-23-18 @ 06:20) (92 - 105)  BP: 125/78 (12-23-18 @ 06:20) (125/78 - 139/86)  RR: 18 (12-23-18 @ 06:20) (18 - 18)  SpO2: 95% (12-23-18 @ 06:20) (95% - 96%)  Wt(kg): --  CAPILLARY BLOOD GLUCOSE      POCT Blood Glucose.: 131 mg/dL (23 Dec 2018 06:22)    I&O's Summary    22 Dec 2018 07:01  -  23 Dec 2018 07:00  --------------------------------------------------------  IN: 0 mL / OUT: 1250 mL / NET: -1250 mL         ---___---___---___---___---___---                       PHYSICAL EXAM:    GEN: A&O X 1 , NAD , comfortable  HEENT: NCAT, PERRL, MMM, no scleral icterus, hearing intact  NECK: Supple, No JVD  CVS: S1S2 , regular , No M/R/G appreciated  PULM: CTA B/L,  no W/R/R appreciated  ABD.: soft. non tender, non distended,  bowel sounds present (+) peg   Extrem: intact pulses , no edema noted  Derm: No rash or ecchymosis noted sacral decubitus ulcer noted        ---___---___---___---___---___---     <<<  LAB AND IMAGING: >>>                          10.5   12.37 )-----------( 446      ( 22 Dec 2018 07:48 )             33.4               12-22    139  |  100  |  67<H>  ----------------------------<  104<H>  4.3   |  23  |  0.65    Ca    10.1      22 Dec 2018 06:30    TPro  6.7  /  Alb  3.9  /  TBili  0.3  /  DBili  x   /  AST  23  /  ALT  18  /  AlkPhos  96  12-22                               [All pertinent / recent available Imaging reports and other labs reviewed]     ---___---___---___---___---___---___ ---___---___---___---___---           <<<  A S S E S S M E N T   A N D   P L A N :  >>>    fever  continue despite no source   dvt on eloquis 5 mg po bid   dementia and depression   continue zoloft and remeron and klonopin   chronic pain  on dilaudid and medical marijuana   asthma continue budesonide   agitation on seroquel               >>______________________<<      Deniz Bolden .         phone   [phone]

## 2018-12-23 NOTE — PROGRESS NOTE ADULT - ASSESSMENT
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  The differential of fever includes infection,malignancy as well as inflammatory fever.  Known DVT in leg.  No clear source of infection  reviewed CT A/P read no acute pathology seen   her repeat blood cx are no growth to date   No active infection found to date   Her  fevers may be explained as inflammatory secondary to known dvt RLE   patient spiked a fever again to 102     Plan:   repeat blood cx x 2 now   i will order test for C diff, daughter reports she had loose bm vs loose bm from tube feeds? Monitor off antibiotics   reviewed with medicine NP on phone agree with surveillance blood cx and a rheum consult also will be placed i was informed

## 2018-12-23 NOTE — CHART NOTE - NSCHARTNOTEFT_GEN_A_CORE
Nutrition Follow Up     Patient seen for: nutrition consult regarding multiple BMs despite Banatrol.     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 Rapid response called for hypotension, pt remains with fever, DVT, . Has positive UA and therefore started on zerbaxa again.      Source: Medical record; RN; daughter at bedside     As per RN, pt tolerating tube feedings, denies pt with nausea or vomiting, reports diarrhea not tube feeding related. Daughter reports last BM today x 3 (12/23), provided education on tube feedings, Vital AF and its nutritional benefits, described tube feedings are most likely not the cause of diarrhea, suggested probiotics as pt was previously on antibiotics- pt's daughter willing to try, discussed with provider.     Diet: NPO with enteral feeds     Enteral /Parenteral Nutrition: Pt receiving Vital AF 1.2 through continuous regimen via PEG at 45 ml ml/hr x 24 hours provides 1080ml total volume, 1296 kcal, and 81 g protein (28 kcal/kg and 1.8 g/kg protein based on dosing wt of 45.4 kg). Pt also receiving Banatrol Plus 3x per day given loose BMs.     Pt noted with large wt fluctuation this admission, question accuracy of weight fluctuations likely due to fluid shifts.   11/13: 105 lbs   11/14: 95.2 lbs  Dosing wt 99.88 lbs (12/4)  12/3: 124lbs   12/4: 123lbs   12/5: 122bs   12/12: 91lbs  12/20: 91lbs   12/21: 92.3 lbs    Pertinent Medications: MEDICATIONS  (STANDING):  ALBUTerol/ipratropium for Nebulization 3 milliLiter(s) Nebulizer every 6 hours  apixaban 5 milliGRAM(s) Oral every 12 hours  AQUAPHOR (petrolatum Ointment) 1 Application(s) Topical three times a day  buDESOnide 160 MICROgram(s)/formoterol 4.5 MICROgram(s) Inhaler 2 Puff(s) Inhalation two times a day  clobetasol 0.05% Ointment 1 Application(s) Topical two times a day  clonazePAM Tablet 0.5 milliGRAM(s) Oral at bedtime  dextrose 5%. 1000 milliLiter(s) (50 mL/Hr) IV Continuous <Continuous>  dextrose 50% Injectable 12.5 Gram(s) IV Push once  dextrose 50% Injectable 25 Gram(s) IV Push once  dextrose 50% Injectable 25 Gram(s) IV Push once  fentaNYL   Patch  12 MICROgram(s)/Hr 1 Patch Transdermal every 72 hours  ferrous    sulfate Liquid 300 milliGRAM(s) Enteral Tube daily  gabapentin   Solution 300 milliGRAM(s) Oral two times a day  insulin lispro (HumaLOG) corrective regimen sliding scale   SubCutaneous every 6 hours  lactobacillus acidophilus 1 Tablet(s) Oral daily  medical marijuana oil 1 milliLiter(s),medical marijuana oil 1 milliLiter(s) 1 milliLiter(s) SubLingual <User Schedule>  minocycline 100 milliGRAM(s) Oral two times a day  mirtazapine 7.5 milliGRAM(s) Oral at bedtime  pantoprazole   Suspension 40 milliGRAM(s) Oral before breakfast  predniSONE   Tablet 7.5 milliGRAM(s) Oral <User Schedule>  QUEtiapine 25 milliGRAM(s) Oral at bedtime  sertraline 50 milliGRAM(s) Oral daily  sodium chloride 0.9% Bolus 250 milliLiter(s) IV Bolus once  sodium chloride 0.9%. 250 milliLiter(s) (25 mL/Hr) IV Continuous <Continuous>  tiotropium 18 MICROgram(s) Capsule 1 Capsule(s) Inhalation daily  tiZANidine 4 milliGRAM(s) Oral <User Schedule>    MEDICATIONS  (PRN):  acetaminophen    Suspension .. 680 milliGRAM(s) Oral every 6 hours PRN Temp greater or equal to 38C (100.4F), Mild Pain (1 - 3)  dextrose 40% Gel 15 Gram(s) Oral once PRN Blood Glucose LESS THAN 70 milliGRAM(s)/deciliter  glucagon  Injectable 1 milliGRAM(s) IntraMuscular once PRN Glucose LESS THAN 70 milligrams/deciliter  nystatin Powder 1 Application(s) Topical two times a day PRN rash/itchiness    Pertinent Labs: (12/23) Glu 127 mg/dL<H> BUN 60 mg/dL<H>   Finger sticks: (12/23) 111-136 (12/22) 113-206  (11/07) HbA1c 5.1%     Skin: wound in right knee; pressure ulcers in sacrum unstageable and stage 2 as per documentation.   Noted + 1 perineum edema as per flow sheets.     Estimated Needs:   [x] no change since previous assessment  [ ] recalculated:     Previous Nutrition Diagnosis: [x] Increased nutrient needs      Nutrition Diagnosis is [x] ongoing - being addressed with tube feedings.     New Nutrition Diagnosis: [x] not applicable    Interventions:     1. Recommend continue current EN provision through continuous regimen and Banatrol TID.   2. Provided education as mentioned above and suggested probiotics - discussed with provider.   3. Recommend Multivitamin and Vitamin C to help with healing and optimize nutrient intake.   4. Continue to obtain weights to identify changes if any.     Monitoring and Evaluation:     [ ] PO intake [ ] Tolerance to diet prescription [x] weights [x] follow up per protocol    [x] other: EN provision/tolerance     RD remains available.  Nicole Gutierrez RDN, #[phone]

## 2018-12-24 LAB
ANION GAP SERPL CALC-SCNC: 15 MMOL/L — SIGNIFICANT CHANGE UP (ref 5–17)
BUN SERPL-MCNC: 57 MG/DL — HIGH (ref 7–23)
C DIFF GDH STL QL: NEGATIVE — SIGNIFICANT CHANGE UP
C DIFF GDH STL QL: SIGNIFICANT CHANGE UP
CALCIUM SERPL-MCNC: 9.8 MG/DL — SIGNIFICANT CHANGE UP (ref 8.4–10.5)
CHLORIDE SERPL-SCNC: 104 MMOL/L — SIGNIFICANT CHANGE UP (ref 96–108)
CO2 SERPL-SCNC: 23 MMOL/L — SIGNIFICANT CHANGE UP (ref 22–31)
CREAT SERPL-MCNC: 0.55 MG/DL — SIGNIFICANT CHANGE UP (ref 0.5–1.3)
GLUCOSE BLDC GLUCOMTR-MCNC: 123 MG/DL — HIGH (ref 70–99)
GLUCOSE BLDC GLUCOMTR-MCNC: 148 MG/DL — HIGH (ref 70–99)
GLUCOSE BLDC GLUCOMTR-MCNC: 156 MG/DL — HIGH (ref 70–99)
GLUCOSE BLDC GLUCOMTR-MCNC: 159 MG/DL — HIGH (ref 70–99)
GLUCOSE SERPL-MCNC: 121 MG/DL — HIGH (ref 70–99)
HCT VFR BLD CALC: 32.7 % — LOW (ref 34.5–45)
HGB BLD-MCNC: 10.4 G/DL — LOW (ref 11.5–15.5)
MCHC RBC-ENTMCNC: 29.8 PG — SIGNIFICANT CHANGE UP (ref 27–34)
MCHC RBC-ENTMCNC: 31.8 GM/DL — LOW (ref 32–36)
MCV RBC AUTO: 93.7 FL — SIGNIFICANT CHANGE UP (ref 80–100)
PLATELET # BLD AUTO: 389 K/UL — SIGNIFICANT CHANGE UP (ref 150–400)
POTASSIUM SERPL-MCNC: 4.1 MMOL/L — SIGNIFICANT CHANGE UP (ref 3.5–5.3)
POTASSIUM SERPL-SCNC: 4.1 MMOL/L — SIGNIFICANT CHANGE UP (ref 3.5–5.3)
RBC # BLD: 3.49 M/UL — LOW (ref 3.8–5.2)
RBC # FLD: 15.8 % — HIGH (ref 10.3–14.5)
SODIUM SERPL-SCNC: 142 MMOL/L — SIGNIFICANT CHANGE UP (ref 135–145)
WBC # BLD: 12.34 K/UL — HIGH (ref 3.8–10.5)
WBC # FLD AUTO: 12.34 K/UL — HIGH (ref 3.8–10.5)

## 2018-12-24 PROCEDURE — 99223 1ST HOSP IP/OBS HIGH 75: CPT | Mod: GC

## 2018-12-24 PROCEDURE — 99232 SBSQ HOSP IP/OBS MODERATE 35: CPT

## 2018-12-24 RX ORDER — ACETAMINOPHEN 500 MG
650 TABLET ORAL ONCE
Qty: 0 | Refills: 0 | Status: COMPLETED | OUTPATIENT
Start: 2018-12-24 | End: 2018-12-24

## 2018-12-24 RX ORDER — LISINOPRIL 2.5 MG/1
5 TABLET ORAL DAILY
Qty: 0 | Refills: 0 | Status: DISCONTINUED | OUTPATIENT
Start: 2018-12-24 | End: 2019-01-16

## 2018-12-24 RX ORDER — HYDROCORTISONE 20 MG
30 TABLET ORAL
Qty: 0 | Refills: 0 | Status: DISCONTINUED | OUTPATIENT
Start: 2018-12-25 | End: 2018-12-29

## 2018-12-24 RX ADMIN — Medication 1: at 12:41

## 2018-12-24 RX ADMIN — MIRTAZAPINE 7.5 MILLIGRAM(S): 45 TABLET, ORALLY DISINTEGRATING ORAL at 22:49

## 2018-12-24 RX ADMIN — TIOTROPIUM BROMIDE 1 CAPSULE(S): 18 CAPSULE ORAL; RESPIRATORY (INHALATION) at 12:40

## 2018-12-24 RX ADMIN — Medication 680 MILLIGRAM(S): at 20:46

## 2018-12-24 RX ADMIN — SERTRALINE 50 MILLIGRAM(S): 25 TABLET, FILM COATED ORAL at 11:48

## 2018-12-24 RX ADMIN — Medication 680 MILLIGRAM(S): at 22:41

## 2018-12-24 RX ADMIN — Medication 3 MILLILITER(S): at 18:07

## 2018-12-24 RX ADMIN — Medication 1 TABLET(S): at 12:41

## 2018-12-24 RX ADMIN — FENTANYL CITRATE 1 PATCH: 50 INJECTION INTRAVENOUS at 18:34

## 2018-12-24 RX ADMIN — GABAPENTIN 300 MILLIGRAM(S): 400 CAPSULE ORAL at 18:07

## 2018-12-24 RX ADMIN — Medication 3 MILLILITER(S): at 00:42

## 2018-12-24 RX ADMIN — Medication 680 MILLIGRAM(S): at 06:58

## 2018-12-24 RX ADMIN — TIZANIDINE 4 MILLIGRAM(S): 4 TABLET ORAL at 20:50

## 2018-12-24 RX ADMIN — Medication 3 MILLILITER(S): at 05:10

## 2018-12-24 RX ADMIN — Medication 3 MILLILITER(S): at 12:40

## 2018-12-24 RX ADMIN — FENTANYL CITRATE 1 PATCH: 50 INJECTION INTRAVENOUS at 21:11

## 2018-12-24 RX ADMIN — BUDESONIDE AND FORMOTEROL FUMARATE DIHYDRATE 2 PUFF(S): 160; 4.5 AEROSOL RESPIRATORY (INHALATION) at 05:12

## 2018-12-24 RX ADMIN — QUETIAPINE FUMARATE 25 MILLIGRAM(S): 200 TABLET, FILM COATED ORAL at 22:49

## 2018-12-24 RX ADMIN — Medication 1 APPLICATION(S): at 18:07

## 2018-12-24 RX ADMIN — Medication 1: at 00:41

## 2018-12-24 RX ADMIN — Medication 300 MILLIGRAM(S): at 11:40

## 2018-12-24 RX ADMIN — Medication 1 APPLICATION(S): at 05:13

## 2018-12-24 RX ADMIN — LISINOPRIL 5 MILLIGRAM(S): 2.5 TABLET ORAL at 23:33

## 2018-12-24 RX ADMIN — BUDESONIDE AND FORMOTEROL FUMARATE DIHYDRATE 2 PUFF(S): 160; 4.5 AEROSOL RESPIRATORY (INHALATION) at 18:08

## 2018-12-24 RX ADMIN — Medication 260 MILLIGRAM(S): at 23:31

## 2018-12-24 RX ADMIN — TIZANIDINE 4 MILLIGRAM(S): 4 TABLET ORAL at 11:41

## 2018-12-24 RX ADMIN — MINOCYCLINE HYDROCHLORIDE 100 MILLIGRAM(S): 45 TABLET, EXTENDED RELEASE ORAL at 05:11

## 2018-12-24 RX ADMIN — Medication 1 APPLICATION(S): at 22:59

## 2018-12-24 RX ADMIN — APIXABAN 5 MILLIGRAM(S): 2.5 TABLET, FILM COATED ORAL at 05:10

## 2018-12-24 RX ADMIN — GABAPENTIN 300 MILLIGRAM(S): 400 CAPSULE ORAL at 06:34

## 2018-12-24 RX ADMIN — SODIUM CHLORIDE 62.5 MILLILITER(S): 9 INJECTION INTRAMUSCULAR; INTRAVENOUS; SUBCUTANEOUS at 12:42

## 2018-12-24 RX ADMIN — MINOCYCLINE HYDROCHLORIDE 100 MILLIGRAM(S): 45 TABLET, EXTENDED RELEASE ORAL at 18:07

## 2018-12-24 RX ADMIN — FENTANYL CITRATE 1 PATCH: 50 INJECTION INTRAVENOUS at 20:50

## 2018-12-24 RX ADMIN — Medication 1 APPLICATION(S): at 11:42

## 2018-12-24 RX ADMIN — Medication 1 APPLICATION(S): at 05:11

## 2018-12-24 RX ADMIN — Medication 0.5 MILLIGRAM(S): at 22:49

## 2018-12-24 RX ADMIN — APIXABAN 5 MILLIGRAM(S): 2.5 TABLET, FILM COATED ORAL at 18:07

## 2018-12-24 RX ADMIN — Medication 3 MILLILITER(S): at 22:51

## 2018-12-24 RX ADMIN — PANTOPRAZOLE SODIUM 40 MILLIGRAM(S): 20 TABLET, DELAYED RELEASE ORAL at 06:34

## 2018-12-24 NOTE — PROGRESS NOTE ADULT - ASSESSMENT
Assessment:  Advanced dementia, right thr mrsa infection s/p leias and spacer on suppressive minocycline, recurrent aspiration events, chronic gavin completed  tx for cre pseudomonas  The patient has completed a full course of Zerbaxa   History of UC, has not been active recently as best I can tell  repeat ucx was reported to growing  yeast which is a colonizing vince   she has been febrile throughout this hospital stay, even while on Ceftazoline-tazobactam  Yeast in urine is likely colonizing vince, her urine post gavin placement looks clear.  The differential of fever includes infection,malignancy as well as inflammatory fever.  Known DVT in leg.  No clear source of infection  Recent CT of C/A/P as are cultures and CDT  Plan:   continue supportive care as per primary medical team   with repeat blood cultures and C.Diff toxin negative I would not make any changes at this point  My bias would be to support her medically and try to keep comfortable  Will continue to look for infection but she seems to be tolerating 1 month of fever well.    [place] guarded

## 2018-12-24 NOTE — CHART NOTE - NSCHARTNOTEFT_GEN_A_CORE
Notified by RN for fever of     . Pt seen and examined at bedside. Denies any chest pain, palpitations, SOB, abdominal pain, headache or urinary symptoms.     Vital Signs Last 24 Hrs  T(C): 38.5 (24 Dec 2018 22:15), Max: 38.9 (24 Dec 2018 20:09)  T(F): 101.3 (24 Dec 2018 22:15), Max: 102 (24 Dec 2018 20:09)  HR: 102 (25 Dec 2018 00:15) (66 - 106)  BP: 100/62 (25 Dec 2018 00:15) (100/62 - 176/94)  BP(mean): --  RR: 16 (25 Dec 2018 00:15) (14 - 18)  SpO2: 98% (25 Dec 2018 00:15) (95% - 98%)  PHYSICAL EXAM:     General: NAD, non-toxic appearance  Neuro: NC, AT, PERRLA, no focal deficits  CV: S1 S2 RRR  Resp: B/L Lungs CTA, nonlabored  Abd: soft, NT, ND, + BS X4 quadrants  Ext: no edema, +PP b/l LE, warm to touch                           10.4   12.34 )-----------( 389      ( 24 Dec 2018 07:51 )             32.7     12-24    142  |  104  |  57<H>  ----------------------------<  121<H>  4.1   |  23  |  0.55    Ca    9.8      24 Dec 2018 06:17      .Blood Blood-Peripheral  12-23-18   No growth to date.  --  --      .Urine Catheterized  12-21-18   10,000 - 49,000 CFU/mL Yeast-like cells, presumptively not Candida  albicans  --  --      .Blood Blood-Peripheral  12-19-18   No growth to date.  --  --      .Urine Clean Catch (Midstream)  12-14-18   50,000 - 99,000 CFU/mL Yeast-like cells, presumptively not Candida  albicans  --  --      .Blood Blood-Peripheral  12-14-18   No growth at 5 days.  --  --      .Blood Blood  12-11-18   No growth at 5 days.  --  --      .Blood Blood-Peripheral  12-09-18   No growth at 5 days.  --  --      .Urine Clean Catch (Midstream)  12-09-18   >100,000 CFU/ml Pseudomonas aeruginosa (Carbapenem Resistant)  --  Pseudomonas aeruginosa (Carbapenem Resistant)      .Blood Blood-Venous  12-04-18   No growth at 5 days.  --  --      .Blood Blood-Peripheral  12-04-18   No growth at 5 days.  --  --      .Blood Blood-Peripheral  12-02-18   Growth in aerobic bottle: Coag Negative Staphylococcus "Susceptibilities  not performed"  "Due to technical problems, Proteus sp. will Not be reported as part of  the BCID panel until further notice"  ***Blood Panel PCR results on this specimen areavailable  approximately 3 hours after the Gram stain result.***  Gram stain, PCR, and/or culture results may not always  correspond due to difference in methodologies.  ************************************************************  This PCR assay wasperformed using [employer].  The following targets are tested for: Enterococcus,  vancomycin resistant enterococci, Listeria monocytogenes,  coagulase negative staphylococci, S. aureus,  methicillin resistant S. aureus, Streptococcus agalactiae  (Group B), S. pneumoniae, S. pyogenes (Group A),  Acinetobacter baumannii, Enterobacter cloacae, E. coli,  Klebsiella oxytoca, K. pneumoniae, Proteus sp.,  Serratia marcescens, Haemophilus influenzae,  Neisseria meningitidis, Pseudomonas aeruginosa, Candida  albicans, C. glabrata, C krusei, C parapsilosis,  C. tropicalis and the KPC resistance gene.  --  Blood Culture PCR      .Urine Catheterized  12-02-18   >100,000 CFU/ml Pseudomonas aeruginosa (Carbapenem Resistant)  --  Pseudomonas aeruginosa (Carbapenem Resistant)          Assessment & Plan   HPI:  History obtained from  and 2 daughters at bedside;  patient is nonverbal due to advanced dementia.    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 Per the family, her breathing is normal (somewhat SOB at baseline). Her PMD 5 days ago got some blood tests sent off which resulted in an elevated proBNP. She was sent in for an evaluation. They deny fevers, chills, coughing, skin rashes. They have been feeding her via the PEG on time, and have attempted to flush with water as instructed. At times, when her residuals ate elevated, they would back off from the extra flushes. Vital signs in ED: HR 93, /85, RR 16, 95-97% RA. (02 Dec 2018 18:39)      Pt acutely presenting with fever of     - Continue current antimicrobials  - Continue IVF for hydration   - Continue antipyretic   - Cooling measures  - Blood cx x2 and urine cx  - CXR   - Will continue to monitor  F/U with primary team in am    Joycelyn PINTO Notified by RN for fever of 102. Pt seen and examined at bedside along with family. AS per daughter; pt has been grimacing and had c/o back pain earlier. Unable to assess ROS as pt minimally verbal    Vital Signs Last 24 Hrs  T(C): 38.5 (24 Dec 2018 22:15), Max: 38.9 (24 Dec 2018 20:09)  T(F): 101.3 (24 Dec 2018 22:15), Max: 102 (24 Dec 2018 20:09)  HR: 102 (25 Dec 2018 00:15) (66 - 106)  BP: 100/62 (25 Dec 2018 00:15) (100/62 - 176/94)  BP(mean): --  RR: 16 (25 Dec 2018 00:15) (14 - 18)  SpO2: 98% (25 Dec 2018 00:15) (95% - 98%)    PHYSICAL EXAM:   General: NAD, non-toxic appearance, contracted; unable to perform complete exam as pt contracted                           10.4   12.34 )-----------( 389      ( 24 Dec 2018 07:51 )             32.7     12-24    142  |  104  |  57<H>  ----------------------------<  121<H>  4.1   |  23  |  0.55    Ca    9.8      24 Dec 2018 06:17      .Blood Blood-Peripheral  12-23-18   No growth to date.  --  --      .Urine Catheterized  12-21-18   10,000 - 49,000 CFU/mL Yeast-like cells, presumptively not Candida  albicans  --  --      .Blood Blood-Peripheral  12-19-18   No growth to date.  --  --      .Urine Clean Catch (Midstream)  12-14-18   50,000 - 99,000 CFU/mL Yeast-like cells, presumptively not Candida  albicans  --  --      .Blood Blood-Peripheral  12-14-18   No growth at 5 days.  --  --      .Blood Blood  12-11-18   No growth at 5 days.  --  --      .Blood Blood-Peripheral  12-09-18   No growth at 5 days.  --  --      .Urine Clean Catch (Midstream)  12-09-18   >100,000 CFU/ml Pseudomonas aeruginosa (Carbapenem Resistant)  --  Pseudomonas aeruginosa (Carbapenem Resistant)      .Blood Blood-Venous  12-04-18   No growth at 5 days.  --  --      .Blood Blood-Peripheral  12-04-18   No growth at 5 days.  --  --      .Blood Blood-Peripheral  12-02-18   Growth in aerobic bottle: Coag Negative Staphylococcus "Susceptibilities  not performed"  "Due to technical problems, Proteus sp. will Not be reported as part of  the BCID panel until further notice"  ***Blood Panel PCR results on this specimen areavailable  approximately 3 hours after the Gram stain result.***  Gram stain, PCR, and/or culture results may not always  correspond due to difference in methodologies.  ************************************************************  This PCR assay wasperformed using [employer].  The following targets are tested for: Enterococcus,  vancomycin resistant enterococci, Listeria monocytogenes,  coagulase negative staphylococci, S. aureus,  methicillin resistant S. aureus, Streptococcus agalactiae  (Group B), S. pneumoniae, S. pyogenes (Group A),  Acinetobacter baumannii, Enterobacter cloacae, E. coli,  Klebsiella oxytoca, K. pneumoniae, Proteus sp.,  Serratia marcescens, Haemophilus influenzae,  Neisseria meningitidis, Pseudomonas aeruginosa, Candida  albicans, C. glabrata, C krusei, C parapsilosis,  C. tropicalis and the KPC resistance gene.  --  Blood Culture PCR      .Urine Catheterized  12-02-18   >100,000 CFU/ml Pseudomonas aeruginosa (Carbapenem Resistant)  --  Pseudomonas aeruginosa (Carbapenem Resistant)    RVP 12/21: negative  C.Diff: 12/21: negative      Xray Chest 1 View- PORTABLE-Urgent (12.20.18 @ 01:58)  The heart is slightly enlarged. The lungs appear to be clear. No   radiographic evidence for congestive heart failure or pneumonia.    CT Abdomen and Pelvis No Cont (12.22.18 @ 15:14)   No acute intra-abdominal pathology or collection.    Assessment & Plan   History obtained from  and 2 daughters at bedside;  patient is nonverbal due to advanced dementia.  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 Per the family, her breathing is normal (somewhat SOB at baseline). Her PMD 5 days ago got some blood tests sent off which resulted in an elevated proBNP. She was sent in for an evaluation. They deny fevers, chills, coughing, skin rashes. They have been feeding her via the PEG on time, and have attempted to flush with water as instructed. At times, when her residuals ate elevated, they would back off from the extra flushes. Vital signs in ED: HR 93, /85, RR 16, 95-97% RA. (02 Dec 2018 18:39)    FUO  - monitor off abx; ID f/u in [place]   - Continue IVF for hydration   - Continue antipyretic   - Cooling measures  - F/u with final results of Blood cx x2 sent on 12/23  - Will continue to monitor    HTN  - lisinopril d/gracie 2/2 hypotension   D/w Dr. Bolden; will restart medication. F/u with cardiology in AM     F/U with primary team in am    Joycelyn PINTO  #[id number]

## 2018-12-24 NOTE — CONSULT NOTE ADULT - ASSESSMENT
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      # Fever of Unknown Origin 68F with advanced dementia (nonverbal, dysphagia with PEG tube, bedbound, chronic gavin), sacral pressure ulcer stage 3, severe persistent asthma on daily Prednisone, right prosthetic hip MRSA infection in 07/2018 s/p pacer in place on oral Minocycline, past episodes of UTI and aspiration pneumonia who originally presented with CHF, found to have acute DVT and UTI, currently with persistent fever.      # Fever of Unknown Origin: Pt with multiple risk factors to be febrile - chronic gavin, sacral ulcer, advanced dementia increasing risk for aspiration PNA vs atelectasis, and extensive acute DVT. Acute DVT and UTI have been treated. At this time, does not meet criteria for FUO because during first two weeks of admission was being treated for acute DVT and UTI. Pt would need to be febrile for >3 weeks without an identifiable source. However, given pt has daughter with APLS, would be beneficial to check serologies.  -Will send PIOTR and APLS serologies (anti-cardiolipin, beta-2 glycoprotein, DRVVT, and silica clotting time).  -If continues to be febrile for >3 weeks, will consider full autoimmune workup at that time.    # Chronic Steroid Use: Pt with decreased bone health as evidenced by non-traumatic R hip fracture. Given increased morbidity associated with hip fractures, pt may benefit from bisphosphonate therapy. Will likely need an outpatient osteoporosis workup.  -At home pt takes prednisone 10mg daily. Currently on prednisone 7.5mg. Pt has received several boluses of IVF. Adrenal crisis could potentiate fever. Therefore, would recommend to stop prednisone 7.5mg and start hydrocortisone 30mg daily.  -Will monitor vitals. 68F with advanced dementia (nonverbal, dysphagia with PEG tube, bedbound, chronic gavin), sacral pressure ulcer stage 3, severe persistent asthma on daily Prednisone, right prosthetic hip MRSA infection in 07/2018 s/p pacer in place on oral Minocycline, past episodes of UTI and aspiration pneumonia who originally presented with CHF, found to have acute DVT and UTI, currently with persistent fever.      # Fever of Unknown Origin: Pt with multiple risk factors to be febrile - chronic gavin, sacral ulcer, advanced dementia increasing risk for aspiration PNA vs atelectasis, and extensive acute DVT. At this time, does not meet criteria for FUO because during first two weeks of admission was being treated for acute DVT and UTI. Pt would need to be febrile for >3 weeks without an identifiable source. However, given pt has daughter with APLS, would be beneficial to check serologies.  -Will send PIOTR and APLS serologies (anti-cardiolipin, beta-2 glycoprotein, DRVVT, and silica clotting time).  -If continues to be febrile for >3 weeks, will consider full autoimmune workup at that time.    # Chronic Steroid Use: Pt with decreased bone health as evidenced by non-traumatic R hip fracture. Given increased morbidity associated with hip fractures, pt may benefit from bisphosphonate therapy. Will likely need an outpatient osteoporosis workup.  -At home pt takes prednisone 10mg daily. Currently on prednisone 7.5mg. Pt has received several boluses of IVF. Adrenal crisis could potentiate fever. Therefore, would recommend to stop prednisone 7.5mg and start hydrocortisone 30mg daily.  -Will monitor vitals. 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      # Fever of Unknown Origin: Pt with multiple risk factors to be febrile - chronic gvain, sacral ulcer, advanced dementia increasing risk for aspiration PNA vs atelectasis, and extensive acute DVT. At this time, does not meet criteria for FUO because during first two weeks of admission was being treated for acute DVT and UTI. Pt would need to be febrile for >3 weeks without an identifiable source. However, given pt has daughter with APLS, would be beneficial to check serologies.  -Will send PIOTR and APLS serologies (anti-cardiolipin, beta-2 glycoprotein, DRVVT, and silica clotting time).  -If continues to be febrile for >3 weeks, will consider full autoimmune workup at that time.    # Chronic Steroid Use: Pt with decreased bone health as evidenced by non-traumatic R hip fracture. Given increased morbidity associated with hip fractures, pt may benefit from bisphosphonate therapy. Will likely need an outpatient osteoporosis workup.  -At home pt takes prednisone 10mg daily. Currently on prednisone 7.5mg. Pt has received several boluses of IVF. Adrenal crisis could potentiate fever but no other signs of adrenal crisis or adrenal insufficieny at this time.   Given persistent fevers could try changing steroid preparation - would recommend to stop prednisone 7.5mg and start hydrocortisone 30mg daily.  -Will monitor vitals.

## 2018-12-24 NOTE — PROGRESS NOTE ADULT - SUBJECTIVE AND OBJECTIVE BOX
HPI:  Patient continues to have fevers of unclear etiology.    Review Of Systems:         Unable to assess in the setting of advanced dementia     Medications:  acetaminophen    Suspension .. 680 milliGRAM(s) Oral every 6 hours PRN  ALBUTerol/ipratropium for Nebulization 3 milliLiter(s) Nebulizer every 6 hours  apixaban 5 milliGRAM(s) Oral every 12 hours  AQUAPHOR (petrolatum Ointment) 1 Application(s) Topical three times a day  buDESOnide 160 MICROgram(s)/formoterol 4.5 MICROgram(s) Inhaler 2 Puff(s) Inhalation two times a day  clobetasol 0.05% Ointment 1 Application(s) Topical two times a day  clonazePAM Tablet 0.5 milliGRAM(s) Oral at bedtime  dextrose 40% Gel 15 Gram(s) Oral once PRN  dextrose 5%. 1000 milliLiter(s) IV Continuous <Continuous>  dextrose 50% Injectable 12.5 Gram(s) IV Push once  dextrose 50% Injectable 25 Gram(s) IV Push once  dextrose 50% Injectable 25 Gram(s) IV Push once  fentaNYL   Patch  12 MICROgram(s)/Hr 1 Patch Transdermal every 72 hours  ferrous    sulfate Liquid 300 milliGRAM(s) Enteral Tube daily  gabapentin   Solution 300 milliGRAM(s) Oral two times a day  glucagon  Injectable 1 milliGRAM(s) IntraMuscular once PRN  insulin lispro (HumaLOG) corrective regimen sliding scale   SubCutaneous every 6 hours  lactobacillus acidophilus 1 Tablet(s) Oral daily  medical marijuana oil 1 milliLiter(s),medical marijuana oil 1 milliLiter(s) 1 milliLiter(s) SubLingual <User Schedule>  minocycline 100 milliGRAM(s) Oral two times a day  mirtazapine 7.5 milliGRAM(s) Oral at bedtime  nystatin Powder 1 Application(s) Topical two times a day PRN  pantoprazole   Suspension 40 milliGRAM(s) Oral before breakfast  QUEtiapine 25 milliGRAM(s) Oral at bedtime  sertraline 50 milliGRAM(s) Oral daily  sodium chloride 0.9%. 250 milliLiter(s) IV Continuous <Continuous>  tiotropium 18 MICROgram(s) Capsule 1 Capsule(s) Inhalation daily  tiZANidine 4 milliGRAM(s) Oral <User Schedule>    PAST MEDICAL & SURGICAL HISTORY:  CVA (cerebral vascular accident)  Fatty pancreas  PNA (pneumonia)  Pulmonary HTN  IGT (impaired glucose tolerance)  Ulcerative colitis  Acid reflux  Anxiety  Depression  Mouth sores  HLD (hyperlipidemia)  Asthma  Humeral head fracture  H/O: hysterectomy  H/O cataract extraction, left  History of knee replacement    Vitals:  T(C): 37.5 (12-24-18 @ 16:30), Max: 38.3 (12-24-18 @ 06:56)  HR: 86 (12-24-18 @ 12:00) (86 - 101)  BP: 126/65 (12-24-18 @ 12:00) (101/63 - 126/65)  BP(mean): --  RR: 18 (12-24-18 @ 12:00) (18 - 18)  SpO2: 96% (12-24-18 @ 12:00) (96% - 97%)  Wt(kg): --  Daily     Daily   I&O's Summary    23 Dec 2018 07:01  -  24 Dec 2018 07:00  --------------------------------------------------------  IN: 0 mL / OUT: 850 mL / NET: -850 mL    24 Dec 2018 07:01  -  24 Dec 2018 19:21  --------------------------------------------------------  IN: 0 mL / OUT: 300 mL / NET: -300 mL        Physical Exam:  Appearance: appears older than stated age; bedbound, demented; no acute distress  Eyes: PERRL, EOMI, pink conjunctiva  HENT: Normal oral mucosa  Cardiovascular: RRR, S1, S2; no LE edema bilaterally; normal JVP  Respiratory: poor inspiratory effort  Gastrointestinal: soft, non-tender, non-distended with normal bowel sounds; +PEG  Musculoskeletal: Bedbound, contracted  Neurologic: cranial nerves grossly intact  Lymphatic: No lymphadenopathy  Psychiatry: awake and alert  Skin: No rashes, ecchymoses, or cyanosis                          10.4   12.34 )-----------( 389      ( 24 Dec 2018 07:51 )             32.7     12-24    142  |  104  |  57<H>  ----------------------------<  121<H>  4.1   |  23  |  0.55    Ca    9.8      24 Dec 2018 06:17            Echo: < from: Transthoracic Echocardiogram (12.03.18 @ 11:23) >  ------------------------------------------------------------------------  Conclusions:  1. Mitral annular calcification, otherwise normal mitral  valve. Moderate-severe mitral regurgitation (vena contracta  0.8 cm)  2. Calcified trileafletaortic valve with normal opening.  Peak transaortic valve gradient equals 3 mm Hg, mean  transaortic valve gradient equals 1 mm Hg, aortic valve  velocity time integral equals 13 cm. Moderate aortic  regurgitation  3. Moderately dilated left atrium.LA volume index = 45  cc/m2.  4. Severe global left ventricular systolic dysfunction.  5. Increased E/e'  is consistent with elevated left  ventricular filling pressure.  6. Moderate right atrial enlargement. Inferior vena cava is  dilated (>=2.5cm) with normal respiratory variability  consistent with right atrial pressure 16-20mm Hg.  7. Right ventricular enlargement with decreased right  ventricular systolic function.  8. Normal tricuspid valve. Severe tricuspid regurgitation.  9. Estimated pulmonary artery systolic pressure equals 82  mm Hg, assuming right atrial pressure equals 8 mm Hg,  consistent with severe pulmonary pressures.  10. Color Doppler demonstrates evidence of left to right  shunt  ------------------------------------------------------------------------  Confirmed on  12/3/2018 - 16:37:07 by Margaret Khan M.D.  ------------------------------------------------------------------------    < end of copied text >    Imaging: < from: Xray Chest 1 View- PORTABLE-Urgent (12.02.18 @ 17:19) >  IMPRESSION:   Limited study due to patient positioning.  Trace right pleural effusion and likely left pleural effusion. Question   mild pulmonary edema.    HUAN JACOBS M.D., RADIOLOGY RESIDENT  This document has been electronically signed.  TONYA AARON M.D., ATTENDING RADIOLOGIST  This document has been electronically signed. Dec  2 2018  9:17PM      < end of copied text >    < from: [facility] Duplex Lower Ext Vein Scan, Bilat (12.15.18 @ 14:01) >  IMPRESSION:     Acute, above the knee deep venous thrombosis extending from the right   common femoral vein through the popliteal vein and into the right   gastrocnemius vein.    No left lower extremity DVT.    The above findings were discussed with BENJI Ryan by Dr. Tapia on   12/15/2018 2:43 PM, with read-back verification.    YELENA TAPIA M.D., RADIOLOGY RESIDENT  This document has been electronically signed.  PRIMO MURILLO M.D., ATTENDING RADIOLOGIST  This document has been electronically signed. Dec 15 2018  3:55PM        < end of copied text >    < from: [facility] Pulmonary Ventilation/Perfusion Scan (12.15.18 @ 10:13) >  FINDINGS: There is heterogeneous distribution of radiopharmaceutical in   both lungs on the ventilation and perfusion images. There are no   segmental perfusion defects in either lung. No significant interval   change since the previous study of 12/3/2018.    IMPRESSION: Very low probability of pulmonary embolus.    KRYSTYNA JULIAN M.D., CHIEF OF NUCLEAR MEDICINE  This document has been electronically signed. Dec 15 2018 10:15AM    < end of copied text >    < from: CT Abdomen and Pelvis No Cont (12.22.18 @ 15:14) >  FINDINGS:    LOWER CHEST: Cardiomegaly.    LIVER: Within normal limits.  BILE DUCTS: Normal caliber.  GALLBLADDER: Cholecystectomy.  SPLEEN: Within normal limits.  PANCREAS: Within normal limits.  ADRENALS: Within normal limits.  KIDNEYS/URETERS: Within normal limits.    BLADDER: Collapsed with a Blackman catheter.  REPRODUCTIVE ORGANS: Limited evaluation secondary to extensive streak   artifact from right lower extremity hardware    BOWEL: Gastrostomy balloon within the stomach. No bowel obstruction.   Appendix is not visualized.  PERITONEUM: No ascites.  VESSELS:  Atheromatous disease of the abdominal aorta.  RETROPERITONEUM: No lymphadenopathy.    ABDOMINAL WALL: Anasarca.  BONES: Degenerative changes of the spine. Complete vertebral body height   loss of L2. Total right hip arthroplasty. Chronic bilateral rib fractures   and left acetabular fracture. Mild anterolisthesis of L4 over L5,   unchanged compared to prior exam of 8/27/2018.    IMPRESSION:    No acute intra-abdominal pathology or collection.    AMIRA GUTIERREZ M.D., RADIOLOGY RESIDENT  This document has been electronically signed.  MARKEL SIMMONS M.D., ATTENDING RADIOLOGIST  This document has been electronically signed. Dec 22 2018  4:52PM    < end of copied text >    Interpretation of Telemetry: Patient no longer on tele

## 2018-12-24 NOTE — PROGRESS NOTE ADULT - ASSESSMENT
68 year-old woman with Alzheimer's dementia seen to have volume overload and biventricular congestive heart failure.  TTE earlier on this admission shows reduced LVEF, elevated pulmonary pressures.  Given severely reduced LVEF, plan to continue ACEi and uptitrate as blood pressure permits. Currently on hold in light of hypotension.  Will defer starting beta-blocker in this patient as she has baseline restrictive airway disease and is beta-agonist inhalers.  Continue anticoagulation with apixaban 5mg BID for patient with age < 80 years and creatinine < 1.5 mg/dL.    Patient with recurrent fevers of unclear etiology. RLE DVT could be etiology of fevers.    ID input appreciated.  Rheumatology consult appreciated - follow-up work-up for antiphospholipid antibody syndrome.

## 2018-12-24 NOTE — CONSULT NOTE ADULT - SUBJECTIVE AND OBJECTIVE BOX
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 TTE on 12/3/2018 showing severe global LV systolic dysfunction, EF 26%, moderate to severe MR, moderate AR. Course is complicated by fever with urine culture from  growing carbapenem resistant Pseudomonas and she was started on Zerbaxa on  and finished a 7 day course (last day ). Course is further complicated by acute DVT of RLL and patient was started on Apixaban. Patient was also noted to have serosanguinous drainage from previous hip incision site, ortho was consulted and no acute orthopedic intervention was deemed indicated.    Pt continues to be febrile, most recent temp to 101. Per , has a remote history of ulcerative colitis, ~45 years ago. Has not noticed new rashes or joint swelling. No weight loss.      PAST MEDICAL & SURGICAL HISTORY:  CVA (cerebral vascular accident)  Fatty pancreas  PNA (pneumonia)  Pulmonary HTN  IGT (impaired glucose tolerance)  Ulcerative colitis  Acid reflux  Anxiety  Depression  Mouth sores  HLD (hyperlipidemia)  Asthma  Humeral head fracture  H/O: hysterectomy  H/O cataract extraction, left  History of knee replacement      Review of Systems:  Pt nonverbal, unable to obtain    MEDICATIONS  (STANDING):  ALBUTerol/ipratropium for Nebulization 3 milliLiter(s) Nebulizer every 6 hours  apixaban 5 milliGRAM(s) Oral every 12 hours  AQUAPHOR (petrolatum Ointment) 1 Application(s) Topical three times a day  buDESOnide 160 MICROgram(s)/formoterol 4.5 MICROgram(s) Inhaler 2 Puff(s) Inhalation two times a day  clobetasol 0.05% Ointment 1 Application(s) Topical two times a day  clonazePAM Tablet 0.5 milliGRAM(s) Oral at bedtime  dextrose 5%. 1000 milliLiter(s) (50 mL/Hr) IV Continuous <Continuous>  dextrose 50% Injectable 12.5 Gram(s) IV Push once  dextrose 50% Injectable 25 Gram(s) IV Push once  dextrose 50% Injectable 25 Gram(s) IV Push once  fentaNYL   Patch  12 MICROgram(s)/Hr 1 Patch Transdermal every 72 hours  ferrous    sulfate Liquid 300 milliGRAM(s) Enteral Tube daily  gabapentin   Solution 300 milliGRAM(s) Oral two times a day  insulin lispro (HumaLOG) corrective regimen sliding scale   SubCutaneous every 6 hours  lactobacillus acidophilus 1 Tablet(s) Oral daily  medical marijuana oil 1 milliLiter(s),medical marijuana oil 1 milliLiter(s) 1 milliLiter(s) SubLingual <User Schedule>  minocycline 100 milliGRAM(s) Oral two times a day  mirtazapine 7.5 milliGRAM(s) Oral at bedtime  pantoprazole   Suspension 40 milliGRAM(s) Oral before breakfast  predniSONE   Tablet 7.5 milliGRAM(s) Oral <User Schedule>  QUEtiapine 25 milliGRAM(s) Oral at bedtime  sertraline 50 milliGRAM(s) Oral daily  sodium chloride 0.9% Bolus 250 milliLiter(s) IV Bolus once  sodium chloride 0.9%. 250 milliLiter(s) (25 mL/Hr) IV Continuous <Continuous>  tiotropium 18 MICROgram(s) Capsule 1 Capsule(s) Inhalation daily  tiZANidine 4 milliGRAM(s) Oral <User Schedule>    MEDICATIONS  (PRN):  acetaminophen    Suspension .. 680 milliGRAM(s) Oral every 6 hours PRN Temp greater or equal to 38C (100.4F), Mild Pain (1 - 3)  dextrose 40% Gel 15 Gram(s) Oral once PRN Blood Glucose LESS THAN 70 milliGRAM(s)/deciliter  glucagon  Injectable 1 milliGRAM(s) IntraMuscular once PRN Glucose LESS THAN 70 milligrams/deciliter  nystatin Powder 1 Application(s) Topical two times a day PRN rash/itchiness      Allergies    ASA; dye contrast (Anaphylaxis)  aspirin (Short breath)  divalproex sodium (Other (Unknown))  Haldol (Other (Unknown))  penicillin (Short breath; Rash)  sulfa drugs (Short breath; Rash)  vancomycin (Rash; Urticaria; Hives)  Xanax (Other (Unknown))    Intolerances        PERTINENT MEDICATION HISTORY:    SOCIAL HISTORY: no tobacco, alcohol, or illicit drug use      FAMILY HISTORY:  Family history of dementia (Grandparent)  Family history of colon cancer (Grandparent)  Family history of lupus (daughter)      Vital Signs Last 24 Hrs  T(C): 38.3 (24 Dec 2018 06:56), Max: 39.1 (23 Dec 2018 12:15)  T(F): 101 (24 Dec 2018 06:56), Max: 102.3 (23 Dec 2018 12:15)  HR: 87 (24 Dec 2018 04:14) (87 - 102)  BP: 101/63 (24 Dec 2018 04:14) (101/63 - 137/75)  BP(mean): --  RR: 18 (24 Dec 2018 04:14) (18 - 18)  SpO2: 97% (24 Dec 2018 04:14) (94% - 97%)    Physical Exam:  General: No apparent distress, contracted  HEENT: MMM  CVS: RRR  Resp: CTA anteriorly; no wheezes or crackles  GI: Soft, NT/ND +BS  MSK: no joint swelling or tenderness, ROM limited to contractures  Neuro: non-verbal  Skin: no visible rashes    LABS:                        10.4   12.34 )-----------( 389      ( 24 Dec 2018 07:51 )             32.7     12-24    142  |  104  |  57<H>  ----------------------------<  121<H>  4.1   |  23  |  0.55    Ca    9.8      24 Dec 2018 06:17        Urinalysis Basic - ( 23 Dec 2018 17:02 )    Color: Yellow / Appearance: Turbid / S.023 / pH: x  Gluc: x / Ketone: Negative  / Bili: Negative / Urobili: Negative mg/dL   Blood: x / Protein: 100 mg/dL / Nitrite: Negative   Leuk Esterase: Large / RBC: 15 /HPF / WBC >720 /HPF   Sq Epi: x / Non Sq Epi: 9 /HPF / Bacteria: Many        RADIOLOGY & ADDITIONAL STUDIES: MYRIAM HEATH  [id number]    HISTORY OF PRESENT ILLNESS:  68 year old woman with advanced dementia (nonverbal, dysphagia with PEG tube, bedbound, chronic gavin), sacral pressure ulcer stage 3, severe persistent asthma on daily Prednisone, 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 TTE on 12/3/2018 showing severe global LV systolic dysfunction, EF 26%, moderate to severe MR, moderate AR. Course is complicated by fever with urine culture from  growing carbapenem resistant Pseudomonas and she was started on Zerbaxa on  and finished a 7 day course (last day ). Course is further complicated by acute DVT of RLL and patient was started on Apixaban. Patient was also noted to have serosanguinous drainage from previous hip incision site, ortho was consulted and no acute orthopedic intervention was deemed indicated.    Pt continues to be febrile, most recent temp to 101. Per , has a remote history of ulcerative colitis, ~45 years ago. Has not noticed new rashes or joint swelling. No weight loss or hair loss.      PAST MEDICAL & SURGICAL HISTORY:  CVA (cerebral vascular accident)  Fatty pancreas  PNA (pneumonia)  Pulmonary HTN  IGT (impaired glucose tolerance)  Ulcerative colitis  Acid reflux  Anxiety  Depression  Mouth sores  HLD (hyperlipidemia)  Asthma  Humeral head fracture  H/O: hysterectomy  H/O cataract extraction, left  History of knee replacement      Review of Systems:  Pt nonverbal, unable to obtain    MEDICATIONS  (STANDING):  ALBUTerol/ipratropium for Nebulization 3 milliLiter(s) Nebulizer every 6 hours  apixaban 5 milliGRAM(s) Oral every 12 hours  AQUAPHOR (petrolatum Ointment) 1 Application(s) Topical three times a day  buDESOnide 160 MICROgram(s)/formoterol 4.5 MICROgram(s) Inhaler 2 Puff(s) Inhalation two times a day  clobetasol 0.05% Ointment 1 Application(s) Topical two times a day  clonazePAM Tablet 0.5 milliGRAM(s) Oral at bedtime  dextrose 5%. 1000 milliLiter(s) (50 mL/Hr) IV Continuous <Continuous>  dextrose 50% Injectable 12.5 Gram(s) IV Push once  dextrose 50% Injectable 25 Gram(s) IV Push once  dextrose 50% Injectable 25 Gram(s) IV Push once  fentaNYL   Patch  12 MICROgram(s)/Hr 1 Patch Transdermal every 72 hours  ferrous    sulfate Liquid 300 milliGRAM(s) Enteral Tube daily  gabapentin   Solution 300 milliGRAM(s) Oral two times a day  insulin lispro (HumaLOG) corrective regimen sliding scale   SubCutaneous every 6 hours  lactobacillus acidophilus 1 Tablet(s) Oral daily  medical marijuana oil 1 milliLiter(s),medical marijuana oil 1 milliLiter(s) 1 milliLiter(s) SubLingual <User Schedule>  minocycline 100 milliGRAM(s) Oral two times a day  mirtazapine 7.5 milliGRAM(s) Oral at bedtime  pantoprazole   Suspension 40 milliGRAM(s) Oral before breakfast  predniSONE   Tablet 7.5 milliGRAM(s) Oral <User Schedule>  QUEtiapine 25 milliGRAM(s) Oral at bedtime  sertraline 50 milliGRAM(s) Oral daily  sodium chloride 0.9% Bolus 250 milliLiter(s) IV Bolus once  sodium chloride 0.9%. 250 milliLiter(s) (25 mL/Hr) IV Continuous <Continuous>  tiotropium 18 MICROgram(s) Capsule 1 Capsule(s) Inhalation daily  tiZANidine 4 milliGRAM(s) Oral <User Schedule>    MEDICATIONS  (PRN):  acetaminophen    Suspension .. 680 milliGRAM(s) Oral every 6 hours PRN Temp greater or equal to 38C (100.4F), Mild Pain (1 - 3)  dextrose 40% Gel 15 Gram(s) Oral once PRN Blood Glucose LESS THAN 70 milliGRAM(s)/deciliter  glucagon  Injectable 1 milliGRAM(s) IntraMuscular once PRN Glucose LESS THAN 70 milligrams/deciliter  nystatin Powder 1 Application(s) Topical two times a day PRN rash/itchiness      Allergies    ASA; dye contrast (Anaphylaxis)  aspirin (Short breath)  divalproex sodium (Other (Unknown))  Haldol (Other (Unknown))  penicillin (Short breath; Rash)  sulfa drugs (Short breath; Rash)  vancomycin (Rash; Urticaria; Hives)  Xanax (Other (Unknown))    Intolerances        PERTINENT MEDICATION HISTORY:    SOCIAL HISTORY: no tobacco, alcohol, or illicit drug use      FAMILY HISTORY:  Family history of dementia (Grandparent)  Family history of colon cancer (Grandparent)  Family history of APLS (daughter)      Vital Signs Last 24 Hrs  T(C): 38.3 (24 Dec 2018 06:56), Max: 39.1 (23 Dec 2018 12:15)  T(F): 101 (24 Dec 2018 06:56), Max: 102.3 (23 Dec 2018 12:15)  HR: 87 (24 Dec 2018 04:14) (87 - 102)  BP: 101/63 (24 Dec 2018 04:14) (101/63 - 137/75)  BP(mean): --  RR: 18 (24 Dec 2018 04:14) (18 - 18)  SpO2: 97% (24 Dec 2018 04:14) (94% - 97%)    Physical Exam:  General: No apparent distress, contracted  HEENT: MMM  CVS: RRR  Resp: CTA anteriorly; no wheezes or crackles  GI: Soft, NT/ND +BS; PEG tube in place - no drainage  MSK: no joint swelling or tenderness, ROM limited to contractures  Neuro: non-verbal  Skin: no visible rashes    LABS:                        10.4   12.34 )-----------( 389      ( 24 Dec 2018 07:51 )             32.7     12-24    142  |  104  |  57<H>  ----------------------------<  121<H>  4.1   |  23  |  0.55    Ca    9.8      24 Dec 2018 06:17        Urinalysis Basic - ( 23 Dec 2018 17:02 )    Color: Yellow / Appearance: Turbid / S.023 / pH: x  Gluc: x / Ketone: Negative  / Bili: Negative / Urobili: Negative mg/dL   Blood: x / Protein: 100 mg/dL / Nitrite: Negative   Leuk Esterase: Large / RBC: 15 /HPF / WBC >720 /HPF   Sq Epi: x / Non Sq Epi: 9 /HPF / Bacteria: Many        RADIOLOGY & ADDITIONAL STUDIES: MYRIAM HEATH  [id number]    HISTORY OF PRESENT ILLNESS:  68 year old woman with advanced dementia (nonverbal, dysphagia with PEG tube, bedbound, chronic gavin), sacral pressure ulcer stage 3, severe persistent asthma on daily Prednisone, right prosthetic hip MRSA infection in 2018 s/p spacer in place on oral Minocycline, past episodes of UTI and aspiration pneumonia initially sent in by her PCP for elevated proBNP thought to be secondary to acute decompensated heart failure now improving on Lasix 40 mg daily. TTE on 12/3/2018 showing severe global LV systolic dysfunction, EF 26%, moderate to severe MR, moderate AR. Course is complicated by fever with urine culture from  growing carbapenem resistant Pseudomonas and she was started on Zerbaxa on  and finished a 7 day course (last day ). Course is further complicated by acute DVT of RLL and patient was started on Apixaban. Patient was also noted to have serosanguinous drainage from previous hip incision site, ortho was consulted and no acute orthopedic intervention was deemed indicated.    Pt continues to be febrile, most recent temp to 101. Per , has a remote history of ulcerative colitis, ~45 years ago. Has not noticed new rashes or joint swelling. No weight loss or hair loss.  Patient  at bedside and notes no new complaints but does note that she has had a low grade fever for a long time.   + on steroids baseline home dose of 10mg daily for years with intermittent higher dose tapers for asthma flares and no recent flares.     PAST MEDICAL & SURGICAL HISTORY:  CVA (cerebral vascular accident)  Fatty pancreas  PNA (pneumonia)  Pulmonary HTN  IGT (impaired glucose tolerance)  Ulcerative colitis  Acid reflux  Anxiety  Depression  Mouth sores  HLD (hyperlipidemia)  Asthma  Humeral head fracture  H/O: hysterectomy  H/O cataract extraction, left  History of knee replacement      Review of Systems:  Pt nonverbal, unable to obtain    MEDICATIONS  (STANDING):  ALBUTerol/ipratropium for Nebulization 3 milliLiter(s) Nebulizer every 6 hours  apixaban 5 milliGRAM(s) Oral every 12 hours  AQUAPHOR (petrolatum Ointment) 1 Application(s) Topical three times a day  buDESOnide 160 MICROgram(s)/formoterol 4.5 MICROgram(s) Inhaler 2 Puff(s) Inhalation two times a day  clobetasol 0.05% Ointment 1 Application(s) Topical two times a day  clonazePAM Tablet 0.5 milliGRAM(s) Oral at bedtime  dextrose 5%. 1000 milliLiter(s) (50 mL/Hr) IV Continuous <Continuous>  dextrose 50% Injectable 12.5 Gram(s) IV Push once  dextrose 50% Injectable 25 Gram(s) IV Push once  dextrose 50% Injectable 25 Gram(s) IV Push once  fentaNYL   Patch  12 MICROgram(s)/Hr 1 Patch Transdermal every 72 hours  ferrous    sulfate Liquid 300 milliGRAM(s) Enteral Tube daily  gabapentin   Solution 300 milliGRAM(s) Oral two times a day  insulin lispro (HumaLOG) corrective regimen sliding scale   SubCutaneous every 6 hours  lactobacillus acidophilus 1 Tablet(s) Oral daily  medical marijuana oil 1 milliLiter(s),medical marijuana oil 1 milliLiter(s) 1 milliLiter(s) SubLingual <User Schedule>  minocycline 100 milliGRAM(s) Oral two times a day  mirtazapine 7.5 milliGRAM(s) Oral at bedtime  pantoprazole   Suspension 40 milliGRAM(s) Oral before breakfast  predniSONE   Tablet 7.5 milliGRAM(s) Oral <User Schedule>  QUEtiapine 25 milliGRAM(s) Oral at bedtime  sertraline 50 milliGRAM(s) Oral daily  sodium chloride 0.9% Bolus 250 milliLiter(s) IV Bolus once  sodium chloride 0.9%. 250 milliLiter(s) (25 mL/Hr) IV Continuous <Continuous>  tiotropium 18 MICROgram(s) Capsule 1 Capsule(s) Inhalation daily  tiZANidine 4 milliGRAM(s) Oral <User Schedule>    MEDICATIONS  (PRN):  acetaminophen    Suspension .. 680 milliGRAM(s) Oral every 6 hours PRN Temp greater or equal to 38C (100.4F), Mild Pain (1 - 3)  dextrose 40% Gel 15 Gram(s) Oral once PRN Blood Glucose LESS THAN 70 milliGRAM(s)/deciliter  glucagon  Injectable 1 milliGRAM(s) IntraMuscular once PRN Glucose LESS THAN 70 milligrams/deciliter  nystatin Powder 1 Application(s) Topical two times a day PRN rash/itchiness      Allergies    ASA; dye contrast (Anaphylaxis)  aspirin (Short breath)  divalproex sodium (Other (Unknown))  Haldol (Other (Unknown))  penicillin (Short breath; Rash)  sulfa drugs (Short breath; Rash)  vancomycin (Rash; Urticaria; Hives)  Xanax (Other (Unknown))    Intolerances        PERTINENT MEDICATION HISTORY:    SOCIAL HISTORY: no tobacco, alcohol, or illicit drug use      FAMILY HISTORY:  Family history of dementia (Grandparent)  Family history of colon cancer (Grandparent)  Family history of APLS (daughter)      Vital Signs Last 24 Hrs  T(C): 38.3 (24 Dec 2018 06:56), Max: 39.1 (23 Dec 2018 12:15)  T(F): 101 (24 Dec 2018 06:56), Max: 102.3 (23 Dec 2018 12:15)  HR: 87 (24 Dec 2018 04:14) (87 - 102)  BP: 101/63 (24 Dec 2018 04:14) (101/63 - 137/75)  BP(mean): --  RR: 18 (24 Dec 2018 04:14) (18 - 18)  SpO2: 97% (24 Dec 2018 04:14) (94% - 97%)    Physical Exam:  General: No apparent distress, contracted  HEENT: MMM,   CVS: RRR  Resp: CTA anteriorly; no wheezes or crackles  GI: Soft, NT/ND +BS; PEG tube in place - no drainage  MSK: no joint swelling or tenderness, ROM limited to contractures  Neuro: non-verbal  Skin: no visible rashes    LABS:                        10.4   12.34 )-----------( 389      ( 24 Dec 2018 07:51 )             32.7     12-    142  |  104  |  57<H>  ----------------------------<  121<H>  4.1   |  23  |  0.55    Ca    9.8      24 Dec 2018 06:17        Urinalysis Basic - ( 23 Dec 2018 17:02 )    Color: Yellow / Appearance: Turbid / S.023 / pH: x  Gluc: x / Ketone: Negative  / Bili: Negative / Urobili: Negative mg/dL   Blood: x / Protein: 100 mg/dL / Nitrite: Negative   Leuk Esterase: Large / RBC: 15 /HPF / WBC >720 /HPF   Sq Epi: x / Non Sq Epi: 9 /HPF / Bacteria: Many        RADIOLOGY & ADDITIONAL STUDIES:

## 2018-12-24 NOTE — PROGRESS NOTE ADULT - SUBJECTIVE AND OBJECTIVE BOX
CC: f/u for fever    Patient reports: she is still intermittently febrile , now with diarrhea    REVIEW OF SYSTEMS:  All other review of systems negative (Comprehensive ROS)    Antimicrobials Day #  :long term  minocycline 100 milliGRAM(s) Oral two times a day    Other Medications Reviewed    T(F): 100.5 (18 @ 11:34), Max: 101 (18 @ 06:56)  HR: 86 (18 @ 12:00)  BP: 126/65 (18 @ 12:00)  RR: 18 (18 @ 12:00)  SpO2: 96% (18 @ 12:00)  Wt(kg): --    PHYSICAL EXAM:  General: alert, no acute distress, poorly interactive  Eyes:  anicteric, no conjunctival injection, no discharge  Oropharynx: no lesions or injection 	  Neck: supple, without adenopathy  Lungs: diminished at bases  Heart: regular rate and rhythm; no murmur, rubs or gallops  Abdomen: soft, nondistended, nontender, without mass or organomegaly, peg in place  Skin: no lesions  Extremities: no clubbing, cyanosis, or edema, contracted  Neurologic: awake, poorly interactive    LAB RESULTS:                        10.4   12.34 )-----------( 389      ( 24 Dec 2018 07:51 )             32.7         142  |  104  |  57<H>  ----------------------------<  121<H>  4.1   |  23  |  0.55    Ca    9.8      24 Dec 2018 06:17        Urinalysis Basic - ( 23 Dec 2018 17:02 )    Color: Yellow / Appearance: Turbid / S.023 / pH: x  Gluc: x / Ketone: Negative  / Bili: Negative / Urobili: Negative mg/dL   Blood: x / Protein: 100 mg/dL / Nitrite: Negative   Leuk Esterase: Large / RBC: 15 /HPF / WBC >720 /HPF   Sq Epi: x / Non Sq Epi: 9 /HPF / Bacteria: Many      MICROBIOLOGY:  RECENT CULTURES:   @ 10:24 .Urine Catheterized     10,000 - 49,000 CFU/mL Yeast-like cells, presumptively not Candida  albicans       @ 23:45 .Blood Blood-Peripheral     No growth to date.    CDT negative      RADIOLOGY REVIEWED:  < from: CT Abdomen and Pelvis No Cont (12.22.18 @ 15:14) >  PROCEDURE DATE:  2018            INTERPRETATION:  CLINICAL INFORMATION: Fever, sepsis.    COMPARISON: CT chest 2018, CT abdomen and pelvis 2018    PROCEDURE:   CT of the Abdomen and Pelvis was performed without intravenous contrast.   Intravenous contrast: None.  Oral contrast: None.  Sagittal and coronal reformats were performed.    FINDINGS:    LOWER CHEST: Cardiomegaly.    LIVER: Within normal limits.  BILE DUCTS: Normal caliber.  GALLBLADDER: Cholecystectomy.  SPLEEN: Within normal limits.  PANCREAS: Within normal limits.  ADRENALS: Within normal limits.  KIDNEYS/URETERS: Within normal limits.    BLADDER: Collapsed with a Blackman catheter.  REPRODUCTIVE ORGANS: Limited evaluation secondary to extensive streak   artifact from right lower extremity hardware    BOWEL: Gastrostomy balloon within the stomach. No bowel obstruction.   Appendix is not visualized.  PERITONEUM: No ascites.  VESSELS:  Atheromatous disease of the abdominal aorta.  RETROPERITONEUM: No lymphadenopathy.    ABDOMINAL WALL: Anasarca.  BONES: Degenerative changes of the spine. Complete vertebral body height   loss of L2. Total right hip arthroplasty. Chronic bilateral rib fractures   and left acetabular fracture. Mild anterolisthesis of L4 over L5,   unchanged compared to prior exam of 2018.    IMPRESSION:    No acute intra-abdominal pathology or collection.

## 2018-12-24 NOTE — PROGRESS NOTE ADULT - ASSESSMENT
fever of unknown origin  rheumatology consult called  cultues taken  id on board  dvt  continue anticoagulant   anxiety  continue zoloft remeron and klonopin   chf  on lisinopril   depression  as above  sacral decubitis  wound care on board

## 2018-12-25 LAB
ANION GAP SERPL CALC-SCNC: 18 MMOL/L — HIGH (ref 5–17)
BUN SERPL-MCNC: 59 MG/DL — HIGH (ref 7–23)
CALCIUM SERPL-MCNC: 10.2 MG/DL — SIGNIFICANT CHANGE UP (ref 8.4–10.5)
CHLORIDE SERPL-SCNC: 104 MMOL/L — SIGNIFICANT CHANGE UP (ref 96–108)
CO2 SERPL-SCNC: 22 MMOL/L — SIGNIFICANT CHANGE UP (ref 22–31)
CREAT SERPL-MCNC: 0.6 MG/DL — SIGNIFICANT CHANGE UP (ref 0.5–1.3)
CULTURE RESULTS: SIGNIFICANT CHANGE UP
CULTURE RESULTS: SIGNIFICANT CHANGE UP
GLUCOSE BLDC GLUCOMTR-MCNC: 137 MG/DL — HIGH (ref 70–99)
GLUCOSE BLDC GLUCOMTR-MCNC: 138 MG/DL — HIGH (ref 70–99)
GLUCOSE BLDC GLUCOMTR-MCNC: 138 MG/DL — HIGH (ref 70–99)
GLUCOSE BLDC GLUCOMTR-MCNC: 211 MG/DL — HIGH (ref 70–99)
GLUCOSE SERPL-MCNC: 113 MG/DL — HIGH (ref 70–99)
HCT VFR BLD CALC: 34.5 % — SIGNIFICANT CHANGE UP (ref 34.5–45)
HGB BLD-MCNC: 10.7 G/DL — LOW (ref 11.5–15.5)
MCHC RBC-ENTMCNC: 30.4 PG — SIGNIFICANT CHANGE UP (ref 27–34)
MCHC RBC-ENTMCNC: 31 GM/DL — LOW (ref 32–36)
MCV RBC AUTO: 98 FL — SIGNIFICANT CHANGE UP (ref 80–100)
PLATELET # BLD AUTO: 448 K/UL — HIGH (ref 150–400)
POTASSIUM SERPL-MCNC: 4.2 MMOL/L — SIGNIFICANT CHANGE UP (ref 3.5–5.3)
POTASSIUM SERPL-SCNC: 4.2 MMOL/L — SIGNIFICANT CHANGE UP (ref 3.5–5.3)
RBC # BLD: 3.52 M/UL — LOW (ref 3.8–5.2)
RBC # FLD: 15.9 % — HIGH (ref 10.3–14.5)
SODIUM SERPL-SCNC: 144 MMOL/L — SIGNIFICANT CHANGE UP (ref 135–145)
SPECIMEN SOURCE: SIGNIFICANT CHANGE UP
SPECIMEN SOURCE: SIGNIFICANT CHANGE UP
WBC # BLD: 14.97 K/UL — HIGH (ref 3.8–10.5)
WBC # FLD AUTO: 14.97 K/UL — HIGH (ref 3.8–10.5)

## 2018-12-25 PROCEDURE — 99233 SBSQ HOSP IP/OBS HIGH 50: CPT

## 2018-12-25 RX ORDER — ACETAMINOPHEN 500 MG
650 TABLET ORAL ONCE
Qty: 0 | Refills: 0 | Status: COMPLETED | OUTPATIENT
Start: 2018-12-25 | End: 2018-12-25

## 2018-12-25 RX ORDER — ACETAMINOPHEN 500 MG
1000 TABLET ORAL ONCE
Qty: 0 | Refills: 0 | Status: DISCONTINUED | OUTPATIENT
Start: 2018-12-25 | End: 2018-12-25

## 2018-12-25 RX ADMIN — Medication 30 MILLIGRAM(S): at 10:46

## 2018-12-25 RX ADMIN — TIZANIDINE 4 MILLIGRAM(S): 4 TABLET ORAL at 09:00

## 2018-12-25 RX ADMIN — MIRTAZAPINE 7.5 MILLIGRAM(S): 45 TABLET, ORALLY DISINTEGRATING ORAL at 22:49

## 2018-12-25 RX ADMIN — LISINOPRIL 5 MILLIGRAM(S): 2.5 TABLET ORAL at 12:49

## 2018-12-25 RX ADMIN — Medication 2: at 12:49

## 2018-12-25 RX ADMIN — MINOCYCLINE HYDROCHLORIDE 100 MILLIGRAM(S): 45 TABLET, EXTENDED RELEASE ORAL at 18:26

## 2018-12-25 RX ADMIN — Medication 650 MILLIGRAM(S): at 00:12

## 2018-12-25 RX ADMIN — GABAPENTIN 300 MILLIGRAM(S): 400 CAPSULE ORAL at 18:26

## 2018-12-25 RX ADMIN — BUDESONIDE AND FORMOTEROL FUMARATE DIHYDRATE 2 PUFF(S): 160; 4.5 AEROSOL RESPIRATORY (INHALATION) at 18:26

## 2018-12-25 RX ADMIN — Medication 680 MILLIGRAM(S): at 13:28

## 2018-12-25 RX ADMIN — GABAPENTIN 300 MILLIGRAM(S): 400 CAPSULE ORAL at 06:59

## 2018-12-25 RX ADMIN — FENTANYL CITRATE 1 PATCH: 50 INJECTION INTRAVENOUS at 20:08

## 2018-12-25 RX ADMIN — Medication 3 MILLILITER(S): at 12:50

## 2018-12-25 RX ADMIN — Medication 1 APPLICATION(S): at 06:57

## 2018-12-25 RX ADMIN — MINOCYCLINE HYDROCHLORIDE 100 MILLIGRAM(S): 45 TABLET, EXTENDED RELEASE ORAL at 07:06

## 2018-12-25 RX ADMIN — Medication 680 MILLIGRAM(S): at 13:12

## 2018-12-25 RX ADMIN — Medication 300 MILLIGRAM(S): at 16:28

## 2018-12-25 RX ADMIN — TIZANIDINE 4 MILLIGRAM(S): 4 TABLET ORAL at 21:02

## 2018-12-25 RX ADMIN — Medication 1 APPLICATION(S): at 14:10

## 2018-12-25 RX ADMIN — APIXABAN 5 MILLIGRAM(S): 2.5 TABLET, FILM COATED ORAL at 18:26

## 2018-12-25 RX ADMIN — SERTRALINE 50 MILLIGRAM(S): 25 TABLET, FILM COATED ORAL at 12:49

## 2018-12-25 RX ADMIN — APIXABAN 5 MILLIGRAM(S): 2.5 TABLET, FILM COATED ORAL at 07:00

## 2018-12-25 RX ADMIN — PANTOPRAZOLE SODIUM 40 MILLIGRAM(S): 20 TABLET, DELAYED RELEASE ORAL at 07:06

## 2018-12-25 RX ADMIN — QUETIAPINE FUMARATE 25 MILLIGRAM(S): 200 TABLET, FILM COATED ORAL at 22:50

## 2018-12-25 RX ADMIN — Medication 1 TABLET(S): at 12:49

## 2018-12-25 RX ADMIN — Medication 0.5 MILLIGRAM(S): at 22:49

## 2018-12-25 RX ADMIN — Medication 260 MILLIGRAM(S): at 18:24

## 2018-12-25 RX ADMIN — Medication 3 MILLILITER(S): at 18:27

## 2018-12-25 RX ADMIN — FENTANYL CITRATE 1 PATCH: 50 INJECTION INTRAVENOUS at 07:00

## 2018-12-25 RX ADMIN — Medication 650 MILLIGRAM(S): at 20:00

## 2018-12-25 RX ADMIN — Medication 3 MILLILITER(S): at 07:06

## 2018-12-25 NOTE — PROGRESS NOTE ADULT - ASSESSMENT
68 year-old woman with Alzheimer's dementia seen to have volume overload and biventricular congestive heart failure.  TTE earlier on this admission shows reduced LVEF, elevated pulmonary pressures.  Given severely reduced LVEF, plan to continue ACEi and uptitrate as blood pressure permits. Currently on hold in light of hypotension.  Will defer starting beta-blocker in this patient as she has baseline restrictive airway disease and is beta-agonist inhalers.  Continue anticoagulation with apixaban 5mg BID for patient with age < 80 years and creatinine < 1.5 mg/dL.    Patient with recurrent fevers of unclear etiology. RLE DVT could be etiology of fevers.  Would consider repeat RVP/flu swab.    ID input appreciated.  Rheumatology consult appreciated - follow-up work-up for antiphospholipid antibody syndrome.

## 2018-12-25 NOTE — PROGRESS NOTE ADULT - SUBJECTIVE AND OBJECTIVE BOX
HPI:  Patient seen and examined with her sister at bedside.  Patient continues to have fevers.    Review Of Systems:       Unable to assess in the setting of dementia.    Medications:  acetaminophen    Suspension .. 680 milliGRAM(s) Oral every 6 hours PRN  ALBUTerol/ipratropium for Nebulization 3 milliLiter(s) Nebulizer every 6 hours  apixaban 5 milliGRAM(s) Oral every 12 hours  AQUAPHOR (petrolatum Ointment) 1 Application(s) Topical three times a day  buDESOnide 160 MICROgram(s)/formoterol 4.5 MICROgram(s) Inhaler 2 Puff(s) Inhalation two times a day  clobetasol 0.05% Ointment 1 Application(s) Topical two times a day  clonazePAM Tablet 0.5 milliGRAM(s) Oral at bedtime  dextrose 40% Gel 15 Gram(s) Oral once PRN  dextrose 5%. 1000 milliLiter(s) IV Continuous <Continuous>  dextrose 50% Injectable 12.5 Gram(s) IV Push once  dextrose 50% Injectable 25 Gram(s) IV Push once  dextrose 50% Injectable 25 Gram(s) IV Push once  fentaNYL   Patch  12 MICROgram(s)/Hr 1 Patch Transdermal every 72 hours  ferrous    sulfate Liquid 300 milliGRAM(s) Enteral Tube daily  gabapentin   Solution 300 milliGRAM(s) Oral two times a day  glucagon  Injectable 1 milliGRAM(s) IntraMuscular once PRN  hydrocortisone 30 milliGRAM(s) Oral <User Schedule>  insulin lispro (HumaLOG) corrective regimen sliding scale   SubCutaneous every 6 hours  lactobacillus acidophilus 1 Tablet(s) Oral daily  lisinopril 5 milliGRAM(s) Oral daily  medical marijuana oil 1 milliLiter(s),medical marijuana oil 1 milliLiter(s) 1 milliLiter(s) SubLingual <User Schedule>  minocycline 100 milliGRAM(s) Oral two times a day  mirtazapine 7.5 milliGRAM(s) Oral at bedtime  nystatin Powder 1 Application(s) Topical two times a day PRN  pantoprazole   Suspension 40 milliGRAM(s) Oral before breakfast  QUEtiapine 25 milliGRAM(s) Oral at bedtime  sertraline 50 milliGRAM(s) Oral daily  sodium chloride 0.9%. 250 milliLiter(s) IV Continuous <Continuous>  tiotropium 18 MICROgram(s) Capsule 1 Capsule(s) Inhalation daily  tiZANidine 4 milliGRAM(s) Oral <User Schedule>    PAST MEDICAL & SURGICAL HISTORY:  CVA (cerebral vascular accident)  Fatty pancreas  PNA (pneumonia)  Pulmonary HTN  IGT (impaired glucose tolerance)  Ulcerative colitis  Acid reflux  Anxiety  Depression  Mouth sores  HLD (hyperlipidemia)  Asthma  Humeral head fracture  H/O: hysterectomy  H/O cataract extraction, left  History of knee replacement    Vitals:  T(C): 38.1 (12-25-18 @ 12:50), Max: 38.9 (12-24-18 @ 20:09)  HR: 100 (12-25-18 @ 12:50) (66 - 106)  BP: 130/72 (12-25-18 @ 12:50) (100/62 - 176/94)  BP(mean): --  RR: 18 (12-25-18 @ 12:50) (14 - 18)  SpO2: 96% (12-25-18 @ 12:50) (95% - 98%)  Wt(kg): --  Daily     Daily   I&O's Summary    24 Dec 2018 07:01  -  25 Dec 2018 07:00  --------------------------------------------------------  IN: 225 mL / OUT: 950 mL / NET: -725 mL        Physical Exam:  Appearance: appears older than stated age; bedbound, demented; no acute distress  Eyes: PERRL, EOMI, pink conjunctiva  HENT: Normal oral mucosa  Cardiovascular: RRR, S1, S2; no LE edema bilaterally; normal JVP  Respiratory: poor inspiratory effort  Gastrointestinal: soft, non-tender, non-distended with normal bowel sounds; +PEG  Musculoskeletal: Bedbound, contracted  Neurologic: cranial nerves grossly intact  Lymphatic: No lymphadenopathy  Psychiatry: awake and alert  Skin: No rashes, ecchymoses, or cyanosis                            10.7   14.97 )-----------( 448      ( 25 Dec 2018 07:57 )             34.5     12-25    144  |  104  |  59<H>  ----------------------------<  113<H>  4.2   |  22  |  0.60    Ca    10.2      25 Dec 2018 06:23            Echo: < from: Transthoracic Echocardiogram (12.03.18 @ 11:23) >  ------------------------------------------------------------------------  Conclusions:  1. Mitral annular calcification, otherwise normal mitral  valve. Moderate-severe mitral regurgitation (vena contracta  0.8 cm)  2. Calcified trileafletaortic valve with normal opening.  Peak transaortic valve gradient equals 3 mm Hg, mean  transaortic valve gradient equals 1 mm Hg, aortic valve  velocity time integral equals 13 cm. Moderate aortic  regurgitation  3. Moderately dilated left atrium.LA volume index = 45  cc/m2.  4. Severe global left ventricular systolic dysfunction.  5. Increased E/e'  is consistent with elevated left  ventricular filling pressure.  6. Moderate right atrial enlargement. Inferior vena cava is  dilated (>=2.5cm) with normal respiratory variability  consistent with right atrial pressure 16-20mm Hg.  7. Right ventricular enlargement with decreased right  ventricular systolic function.  8. Normal tricuspid valve. Severe tricuspid regurgitation.  9. Estimated pulmonary artery systolic pressure equals 82  mm Hg, assuming right atrial pressure equals 8 mm Hg,  consistent with severe pulmonary pressures.  10. Color Doppler demonstrates evidence of left to right  shunt  ------------------------------------------------------------------------  Confirmed on  12/3/2018 - 16:37:07 by Margaret Khan M.D.  ------------------------------------------------------------------------    < end of copied text >    Imaging: < from: Xray Chest 1 View- PORTABLE-Urgent (12.02.18 @ 17:19) >  IMPRESSION:   Limited study due to patient positioning.  Trace right pleural effusion and likely left pleural effusion. Question   mild pulmonary edema.    HUAN JACOBS M.D., RADIOLOGY RESIDENT  This document has been electronically signed.  TONYA AARON M.D., ATTENDING RADIOLOGIST  This document has been electronically signed. Dec  2 2018  9:17PM      < end of copied text >    < from: [facility] Duplex Lower Ext Vein Scan, Bilat (12.15.18 @ 14:01) >  IMPRESSION:     Acute, above the knee deep venous thrombosis extending from the right   common femoral vein through the popliteal vein and into the right   gastrocnemius vein.    No left lower extremity DVT.    The above findings were discussed with BEJNI Ryan by Dr. Tapia on   12/15/2018 2:43 PM, with read-back verification.    YELENA TAPIA M.D., RADIOLOGY RESIDENT  This document has been electronically signed.  PRIMO MURILLO M.D., ATTENDING RADIOLOGIST  This document has been electronically signed. Dec 15 2018  3:55PM        < end of copied text >    < from: NM Pulmonary Ventilation/Perfusion Scan (12.15.18 @ 10:13) >  FINDINGS: There is heterogeneous distribution of radiopharmaceutical in   both lungs on the ventilation and perfusion images. There are no   segmental perfusion defects in either lung. No significant interval   change since the previous study of 12/3/2018.    IMPRESSION: Very low probability of pulmonary embolus.    KRYSTYNA JULIAN M.D., CHIEF OF NUCLEAR MEDICINE  This document has been electronically signed. Dec 15 2018 10:15AM    < end of copied text >    < from: CT Abdomen and Pelvis No Cont (12.22.18 @ 15:14) >  FINDINGS:    LOWER CHEST: Cardiomegaly.    LIVER: Within normal limits.  BILE DUCTS: Normal caliber.  GALLBLADDER: Cholecystectomy.  SPLEEN: Within normal limits.  PANCREAS: Within normal limits.  ADRENALS: Within normal limits.  KIDNEYS/URETERS: Within normal limits.    BLADDER: Collapsed with a Blackman catheter.  REPRODUCTIVE ORGANS: Limited evaluation secondary to extensive streak   artifact from right lower extremity hardware    BOWEL: Gastrostomy balloon within the stomach. No bowel obstruction.   Appendix is not visualized.  PERITONEUM: No ascites.  VESSELS:  Atheromatous disease of the abdominal aorta.  RETROPERITONEUM: No lymphadenopathy.    ABDOMINAL WALL: Anasarca.  BONES: Degenerative changes of the spine. Complete vertebral body height   loss of L2. Total right hip arthroplasty. Chronic bilateral rib fractures   and left acetabular fracture. Mild anterolisthesis of L4 over L5,   unchanged compared to prior exam of 8/27/2018.    IMPRESSION:    No acute intra-abdominal pathology or collection.    AMIRA GUTIERREZ M.D., RADIOLOGY RESIDENT  This document has been electronically signed.  MARKEL SIMMONS M.D., ATTENDING RADIOLOGIST  This document has been electronically signed. Dec 22 2018  4:52PM    < end of copied text >    Interpretation of Telemetry: Patient no longer on tele

## 2018-12-25 NOTE — PROGRESS NOTE ADULT - SUBJECTIVE AND OBJECTIVE BOX
CC: f/u for fever    Patient reports: she is not verbal, tolerating enteral feeds    REVIEW OF SYSTEMS:  All other review of systems negative (Comprehensive ROS)    Antimicrobials Day #  :  minocycline 100 milliGRAM(s) Oral two times a day    Other Medications Reviewed    T(F): 98.8 (18 @ 08:34), Max: 102 (18 @ 20:09)  HR: 98 (18 @ 08:34)  BP: 100/64 (18 @ 08:34)  RR: 17 (18 @ 08:34)  SpO2: 96% (18 @ 08:34)  Wt(kg): --    PHYSICAL EXAM:  General: lethargic, no acute distress, cachectic  Eyes:  anicteric, no conjunctival injection, no discharge  Oropharynx: no lesions or injection 	  Neck: supple, without adenopathy  Lungs: clear to auscultation, poor inspiratory effert  Heart: regular rate and rhythm; no murmur, rubs or gallops  Abdomen: soft, nondistended, nontender, peg in place  Skin: no lesions  Extremities: contracted  Neurologic: alert, not verbal    LAB RESULTS:                        10.7   14.97 )-----------( 448      ( 25 Dec 2018 07:57 )             34.5         144  |  104  |  59<H>  ----------------------------<  113<H>  4.2   |  22  |  0.60    Ca    10.2      25 Dec 2018 06:23        Urinalysis Basic - ( 23 Dec 2018 17:02 )    Color: Yellow / Appearance: Turbid / S.023 / pH: x  Gluc: x / Ketone: Negative  / Bili: Negative / Urobili: Negative mg/dL   Blood: x / Protein: 100 mg/dL / Nitrite: Negative   Leuk Esterase: Large / RBC: 15 /HPF / WBC >720 /HPF   Sq Epi: x / Non Sq Epi: 9 /HPF / Bacteria: Many      MICROBIOLOGY:  RECENT CULTURES:   @ 17:17 .Blood Blood-Peripheral     No growth to date.       @ 10:24 .Urine Catheterized     10,000 - 49,000 CFU/mL Yeast-like cells, presumptively not Candida  albicans          RADIOLOGY REVIEWED:  < from: CT Abdomen and Pelvis No Cont (12.22.18 @ 15:14) >  PROCEDURE:   CT of the Abdomen and Pelvis was performed without intravenous contrast.   Intravenous contrast: None.  Oral contrast: None.  Sagittal and coronal reformats were performed.    FINDINGS:    LOWER CHEST: Cardiomegaly.    LIVER: Within normal limits.  BILE DUCTS: Normal caliber.  GALLBLADDER: Cholecystectomy.  SPLEEN: Within normal limits.  PANCREAS: Within normal limits.  ADRENALS: Within normal limits.  KIDNEYS/URETERS: Within normal limits.    BLADDER: Collapsed with a Blackman catheter.  REPRODUCTIVE ORGANS: Limited evaluation secondary to extensive streak   artifact from right lower extremity hardware    BOWEL: Gastrostomy balloon within the stomach. No bowel obstruction.   Appendix is not visualized.  PERITONEUM: No ascites.  VESSELS:  Atheromatous disease of the abdominal aorta.  RETROPERITONEUM: No lymphadenopathy.    ABDOMINAL WALL: Anasarca.  BONES: Degenerative changes of the spine. Complete vertebral body height   loss of L2. Total right hip arthroplasty. Chronic bilateral rib fractures   and left acetabular fracture. Mild anterolisthesis of L4 over L5,   unchanged compared to prior exam of 2018.    IMPRESSION:    No acute intra-abdominal pathology or collection.

## 2018-12-25 NOTE — PROVIDER CONTACT NOTE (OTHER) - BACKGROUND
Pt admitted for elevated Pro-BNP. Exhibits persistent fevers throughout admission w/ unknown etiology of source. PSA in urine likely colonized as per ID. Pt taken of lisinopril d/t hypotensive events.

## 2018-12-25 NOTE — PROGRESS NOTE ADULT - SUBJECTIVE AND OBJECTIVE BOX
---___---___---___---___---___---___ ---___---___---___---___---___---___---___---___---___---                    <<<  M E D I C A L   A T T E N D I N G    F O L L O W    U P   N O T E  >>>    - Patient seen and examined by me approximately thirty minutes ago.  - In summary, patient is a 68y year old Female who origianlly presented with elevated bnp now with dvt and fever x 2 weeks       ---___---___---___---___---___---      <<<  MEDICATIONS:  >>>    MEDICATIONS  (STANDING):  ALBUTerol/ipratropium for Nebulization 3 milliLiter(s) Nebulizer every 6 hours  apixaban 5 milliGRAM(s) Oral every 12 hours  AQUAPHOR (petrolatum Ointment) 1 Application(s) Topical three times a day  buDESOnide 160 MICROgram(s)/formoterol 4.5 MICROgram(s) Inhaler 2 Puff(s) Inhalation two times a day  clobetasol 0.05% Ointment 1 Application(s) Topical two times a day  clonazePAM Tablet 0.5 milliGRAM(s) Oral at bedtime  dextrose 5%. 1000 milliLiter(s) (50 mL/Hr) IV Continuous <Continuous>  dextrose 50% Injectable 12.5 Gram(s) IV Push once  dextrose 50% Injectable 25 Gram(s) IV Push once  dextrose 50% Injectable 25 Gram(s) IV Push once  fentaNYL   Patch  12 MICROgram(s)/Hr 1 Patch Transdermal every 72 hours  ferrous    sulfate Liquid 300 milliGRAM(s) Enteral Tube daily  gabapentin   Solution 300 milliGRAM(s) Oral two times a day  hydrocortisone 30 milliGRAM(s) Oral <User Schedule>  insulin lispro (HumaLOG) corrective regimen sliding scale   SubCutaneous every 6 hours  lactobacillus acidophilus 1 Tablet(s) Oral daily  lisinopril 5 milliGRAM(s) Oral daily  medical marijuana oil 1 milliLiter(s),medical marijuana oil 1 milliLiter(s) 1 milliLiter(s) SubLingual <User Schedule>  minocycline 100 milliGRAM(s) Oral two times a day  mirtazapine 7.5 milliGRAM(s) Oral at bedtime  pantoprazole   Suspension 40 milliGRAM(s) Oral before breakfast  QUEtiapine 25 milliGRAM(s) Oral at bedtime  sertraline 50 milliGRAM(s) Oral daily  sodium chloride 0.9%. 250 milliLiter(s) (25 mL/Hr) IV Continuous <Continuous>  tiotropium 18 MICROgram(s) Capsule 1 Capsule(s) Inhalation daily  tiZANidine 4 milliGRAM(s) Oral <User Schedule>      MEDICATIONS  (PRN):  acetaminophen    Suspension .. 680 milliGRAM(s) Oral every 6 hours PRN Temp greater or equal to 38C (100.4F), Mild Pain (1 - 3)  dextrose 40% Gel 15 Gram(s) Oral once PRN Blood Glucose LESS THAN 70 milliGRAM(s)/deciliter  glucagon  Injectable 1 milliGRAM(s) IntraMuscular once PRN Glucose LESS THAN 70 milligrams/deciliter  nystatin Powder 1 Application(s) Topical two times a day PRN rash/itchiness       ---___---___---___---___---___---     <<<REVIEW OF SYSTEM: >>>     unable to obtain      ---___---___---___---___---___---          <<<  VITAL SIGNS: >>>    T(F): 98.8 (18 @ 08:34), Max: 102 (18 @ 20:09)  HR: 98 (18 @ 08:34) (66 - 106)  BP: 100/64 (18 @ 08:34) (100/62 - 176/94)  RR: 17 (18 @ 08:34) (14 - 18)  SpO2: 96% (18 @ 08:34) (95% - 98%)  Wt(kg): --  CAPILLARY BLOOD GLUCOSE      POCT Blood Glucose.: 138 mg/dL (25 Dec 2018 07:41)    I&O's Summary    24 Dec 2018 07:01  -  25 Dec 2018 07:00  --------------------------------------------------------  IN: 225 mL / OUT: 950 mL / NET: -725 mL         ---___---___---___---___---___---                       PHYSICAL EXAM:    GEN: A&O X 3 , NAD , comfortable  HEENT: NCAT, PERRL, MMM, no scleral icterus, hearing intact  NECK: Supple, No JVD  CVS: S1S2 , regular , No M/R/G appreciated  PULM: CTA B/L,  no W/R/R appreciated peg noted   ABD.: soft. non tender, non distended,  bowel sounds present  Extrem: intact pulses , no edema noted contractures of arms and legs  and neck noted   Derm: No rash or ecchymosis noted  PSYCH:   depression,  anxious     ---___---___---___---___---___---     <<<  LAB AND IMAGING: >>>                          10.7   14.97 )-----------( 448      ( 25 Dec 2018 07:57 )             34.5               12-    144  |  104  |  59<H>  ----------------------------<  113<H>  4.2   |  22  |  0.60    Ca    10.2      25 Dec 2018 06:23             Urinalysis Basic - ( 23 Dec 2018 17:02 )    Color: Yellow / Appearance: Turbid / S.023 / pH: x  Gluc: x / Ketone: Negative  / Bili: Negative / Urobili: Negative mg/dL   Blood: x / Protein: 100 mg/dL / Nitrite: Negative   Leuk Esterase: Large / RBC: 15 /HPF / WBC >720 /HPF   Sq Epi: x / Non Sq Epi: 9 /HPF / Bacteria: Many                        [All pertinent / recent available Imaging reports and other labs reviewed]     ---___---___---___---___---___---___ ---___---___---___---___---           <<<  A S S E S S M E N T   DEO N YE   P L A N :  >>>          -GI/DVT Prophylaxis.    --------------------------------------------  Case discussed with   Education given on     >>______________________<<      Deniz Bolden .         phone   [phone]

## 2018-12-25 NOTE — PROGRESS NOTE ADULT - ASSESSMENT
fever  dvt   chf   chronic pain   anxiety  asthma   h/o cva    continue Remeron   klonopin  zoloft   continue Eliquis  continue budesoniude  continue medical marijuana  for pain

## 2018-12-25 NOTE — PROGRESS NOTE ADULT - ASSESSMENT
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  The differential of fever includes infection,malignancy as well as inflammatory fever.  Known DVT in leg.  No clear source of infection  Recent CT of C/A/P as are cultures and CDT  Will consider antifungal therapy but with no  obstruction it is uncommon to see sytemic candidal infections from the urine  Plan:   continue supportive care as per primary medical team   with repeat blood cultures and C.Diff toxin negative I would not make any changes at this point  My bias would be to support her medically and try to keep comfortable  Will continue to look for infection but she seems to be tolerating 1 month of fever well.  ID issues reviewed with her daughter at bedside    [place] guarded

## 2018-12-26 LAB
ANION GAP SERPL CALC-SCNC: 15 MMOL/L — SIGNIFICANT CHANGE UP (ref 5–17)
B2 GLYCOPROT1 AB SER QL: NEGATIVE — SIGNIFICANT CHANGE UP
BLD GP AB SCN SERPL QL: NEGATIVE — SIGNIFICANT CHANGE UP
BUN SERPL-MCNC: 75 MG/DL — HIGH (ref 7–23)
CALCIUM SERPL-MCNC: 9.8 MG/DL — SIGNIFICANT CHANGE UP (ref 8.4–10.5)
CARDIOLIPIN AB SER-ACNC: NEGATIVE — SIGNIFICANT CHANGE UP
CHLORIDE SERPL-SCNC: 105 MMOL/L — SIGNIFICANT CHANGE UP (ref 96–108)
CO2 SERPL-SCNC: 22 MMOL/L — SIGNIFICANT CHANGE UP (ref 22–31)
CREAT SERPL-MCNC: 0.75 MG/DL — SIGNIFICANT CHANGE UP (ref 0.5–1.3)
DRVVT 50/50: 36.6 SEC — SIGNIFICANT CHANGE UP
DRVVT SCREEN TO CONFIRM RATIO: SIGNIFICANT CHANGE UP
GLUCOSE BLDC GLUCOMTR-MCNC: 128 MG/DL — HIGH (ref 70–99)
GLUCOSE BLDC GLUCOMTR-MCNC: 147 MG/DL — HIGH (ref 70–99)
GLUCOSE BLDC GLUCOMTR-MCNC: 165 MG/DL — HIGH (ref 70–99)
GLUCOSE BLDC GLUCOMTR-MCNC: 187 MG/DL — HIGH (ref 70–99)
GLUCOSE SERPL-MCNC: 115 MG/DL — HIGH (ref 70–99)
HCT VFR BLD CALC: 28.5 % — LOW (ref 34.5–45)
HCT VFR BLD CALC: 31.4 % — LOW (ref 34.5–45)
HGB BLD-MCNC: 10.4 G/DL — LOW (ref 11.5–15.5)
HGB BLD-MCNC: 9.2 G/DL — LOW (ref 11.5–15.5)
LA NT DPL PPP QL: 43.3 SEC — SIGNIFICANT CHANGE UP
MCHC RBC-ENTMCNC: 30.5 PG — SIGNIFICANT CHANGE UP (ref 27–34)
MCHC RBC-ENTMCNC: 30.7 PG — SIGNIFICANT CHANGE UP (ref 27–34)
MCHC RBC-ENTMCNC: 32.3 GM/DL — SIGNIFICANT CHANGE UP (ref 32–36)
MCHC RBC-ENTMCNC: 33 GM/DL — SIGNIFICANT CHANGE UP (ref 32–36)
MCV RBC AUTO: 92.3 FL — SIGNIFICANT CHANGE UP (ref 80–100)
MCV RBC AUTO: 95 FL — SIGNIFICANT CHANGE UP (ref 80–100)
NORMALIZED SCT PPP-RTO: 0.85 RATIO — SIGNIFICANT CHANGE UP (ref 0–1.16)
NORMALIZED SCT PPP-RTO: SIGNIFICANT CHANGE UP
PLATELET # BLD AUTO: 366 K/UL — SIGNIFICANT CHANGE UP (ref 150–400)
PLATELET # BLD AUTO: 376 K/UL — SIGNIFICANT CHANGE UP (ref 150–400)
POTASSIUM SERPL-MCNC: 4.1 MMOL/L — SIGNIFICANT CHANGE UP (ref 3.5–5.3)
POTASSIUM SERPL-SCNC: 4.1 MMOL/L — SIGNIFICANT CHANGE UP (ref 3.5–5.3)
RBC # BLD: 3 M/UL — LOW (ref 3.8–5.2)
RBC # BLD: 3.4 M/UL — LOW (ref 3.8–5.2)
RBC # FLD: 14 % — SIGNIFICANT CHANGE UP (ref 10.3–14.5)
RBC # FLD: 15.9 % — HIGH (ref 10.3–14.5)
RH IG SCN BLD-IMP: NEGATIVE — SIGNIFICANT CHANGE UP
SODIUM SERPL-SCNC: 142 MMOL/L — SIGNIFICANT CHANGE UP (ref 135–145)
WBC # BLD: 14.12 K/UL — HIGH (ref 3.8–10.5)
WBC # BLD: 20.7 K/UL — HIGH (ref 3.8–10.5)
WBC # FLD AUTO: 14.12 K/UL — HIGH (ref 3.8–10.5)
WBC # FLD AUTO: 20.7 K/UL — HIGH (ref 3.8–10.5)

## 2018-12-26 PROCEDURE — 99233 SBSQ HOSP IP/OBS HIGH 50: CPT

## 2018-12-26 RX ORDER — CLONAZEPAM 1 MG
0.5 TABLET ORAL AT BEDTIME
Qty: 0 | Refills: 0 | Status: DISCONTINUED | OUTPATIENT
Start: 2018-12-26 | End: 2019-01-02

## 2018-12-26 RX ADMIN — Medication 0.5 MILLIGRAM(S): at 22:32

## 2018-12-26 RX ADMIN — PANTOPRAZOLE SODIUM 40 MILLIGRAM(S): 20 TABLET, DELAYED RELEASE ORAL at 06:15

## 2018-12-26 RX ADMIN — APIXABAN 5 MILLIGRAM(S): 2.5 TABLET, FILM COATED ORAL at 17:26

## 2018-12-26 RX ADMIN — Medication 680 MILLIGRAM(S): at 01:58

## 2018-12-26 RX ADMIN — TIOTROPIUM BROMIDE 1 CAPSULE(S): 18 CAPSULE ORAL; RESPIRATORY (INHALATION) at 11:52

## 2018-12-26 RX ADMIN — BUDESONIDE AND FORMOTEROL FUMARATE DIHYDRATE 2 PUFF(S): 160; 4.5 AEROSOL RESPIRATORY (INHALATION) at 17:26

## 2018-12-26 RX ADMIN — Medication 3 MILLILITER(S): at 11:51

## 2018-12-26 RX ADMIN — Medication 1 APPLICATION(S): at 06:15

## 2018-12-26 RX ADMIN — GABAPENTIN 300 MILLIGRAM(S): 400 CAPSULE ORAL at 06:15

## 2018-12-26 RX ADMIN — Medication 3 MILLILITER(S): at 17:26

## 2018-12-26 RX ADMIN — Medication 30 MILLIGRAM(S): at 09:13

## 2018-12-26 RX ADMIN — GABAPENTIN 300 MILLIGRAM(S): 400 CAPSULE ORAL at 18:16

## 2018-12-26 RX ADMIN — Medication 680 MILLIGRAM(S): at 13:13

## 2018-12-26 RX ADMIN — FENTANYL CITRATE 1 PATCH: 50 INJECTION INTRAVENOUS at 20:04

## 2018-12-26 RX ADMIN — Medication 680 MILLIGRAM(S): at 02:55

## 2018-12-26 RX ADMIN — BUDESONIDE AND FORMOTEROL FUMARATE DIHYDRATE 2 PUFF(S): 160; 4.5 AEROSOL RESPIRATORY (INHALATION) at 06:15

## 2018-12-26 RX ADMIN — Medication 680 MILLIGRAM(S): at 12:41

## 2018-12-26 RX ADMIN — Medication 1 APPLICATION(S): at 14:59

## 2018-12-26 RX ADMIN — Medication 1 APPLICATION(S): at 00:51

## 2018-12-26 RX ADMIN — MIRTAZAPINE 7.5 MILLIGRAM(S): 45 TABLET, ORALLY DISINTEGRATING ORAL at 22:32

## 2018-12-26 RX ADMIN — Medication 3 MILLILITER(S): at 06:14

## 2018-12-26 RX ADMIN — TIZANIDINE 4 MILLIGRAM(S): 4 TABLET ORAL at 09:14

## 2018-12-26 RX ADMIN — APIXABAN 5 MILLIGRAM(S): 2.5 TABLET, FILM COATED ORAL at 06:14

## 2018-12-26 RX ADMIN — FENTANYL CITRATE 1 PATCH: 50 INJECTION INTRAVENOUS at 07:58

## 2018-12-26 RX ADMIN — Medication 1 APPLICATION(S): at 06:51

## 2018-12-26 RX ADMIN — MINOCYCLINE HYDROCHLORIDE 100 MILLIGRAM(S): 45 TABLET, EXTENDED RELEASE ORAL at 06:14

## 2018-12-26 RX ADMIN — Medication 1: at 12:02

## 2018-12-26 RX ADMIN — Medication 1 TABLET(S): at 11:52

## 2018-12-26 RX ADMIN — QUETIAPINE FUMARATE 25 MILLIGRAM(S): 200 TABLET, FILM COATED ORAL at 22:32

## 2018-12-26 RX ADMIN — MINOCYCLINE HYDROCHLORIDE 100 MILLIGRAM(S): 45 TABLET, EXTENDED RELEASE ORAL at 17:27

## 2018-12-26 RX ADMIN — LISINOPRIL 5 MILLIGRAM(S): 2.5 TABLET ORAL at 06:14

## 2018-12-26 RX ADMIN — Medication 1: at 18:33

## 2018-12-26 RX ADMIN — SERTRALINE 50 MILLIGRAM(S): 25 TABLET, FILM COATED ORAL at 11:52

## 2018-12-26 RX ADMIN — Medication 300 MILLIGRAM(S): at 11:51

## 2018-12-26 RX ADMIN — Medication 1 APPLICATION(S): at 17:26

## 2018-12-26 RX ADMIN — TIZANIDINE 4 MILLIGRAM(S): 4 TABLET ORAL at 21:06

## 2018-12-26 RX ADMIN — Medication 3 MILLILITER(S): at 00:53

## 2018-12-26 NOTE — PROGRESS NOTE ADULT - SUBJECTIVE AND OBJECTIVE BOX
---___---___---___---___---___---___ ---___---___---___---___---___---___---___---___---___---                    <<<  M E D I C A L   A T T E N D I N G    F O L L O W    U P   N O T E  >>>    - Patient seen and examined by me approximately thirty minutes ago.  - In summary, patient is a 68y year old Female who origianlly presented with chf now with dvt  and fever lasting 2 weeks   - Patient today overall doing ok, comfortable, eating OK. through peg      ---___---___---___---___---___---      <<<  MEDICATIONS:  >>>    MEDICATIONS  (STANDING):  ALBUTerol/ipratropium for Nebulization 3 milliLiter(s) Nebulizer every 6 hours  apixaban 5 milliGRAM(s) Oral every 12 hours  AQUAPHOR (petrolatum Ointment) 1 Application(s) Topical three times a day  buDESOnide 160 MICROgram(s)/formoterol 4.5 MICROgram(s) Inhaler 2 Puff(s) Inhalation two times a day  clobetasol 0.05% Ointment 1 Application(s) Topical two times a day  clonazePAM Tablet 0.5 milliGRAM(s) Oral at bedtime  dextrose 5%. 1000 milliLiter(s) (50 mL/Hr) IV Continuous <Continuous>  dextrose 50% Injectable 12.5 Gram(s) IV Push once  dextrose 50% Injectable 25 Gram(s) IV Push once  dextrose 50% Injectable 25 Gram(s) IV Push once  fentaNYL   Patch  12 MICROgram(s)/Hr 1 Patch Transdermal every 72 hours  ferrous    sulfate Liquid 300 milliGRAM(s) Enteral Tube daily  gabapentin   Solution 300 milliGRAM(s) Oral two times a day  hydrocortisone 30 milliGRAM(s) Oral <User Schedule>  insulin lispro (HumaLOG) corrective regimen sliding scale   SubCutaneous every 6 hours  lactobacillus acidophilus 1 Tablet(s) Oral daily  lisinopril 5 milliGRAM(s) Oral daily  medical marijuana oil 1 milliLiter(s),medical marijuana oil 1 milliLiter(s),medical marijuana oil 1 milliLiter(s) 1 milliLiter(s) SubLingual <User Schedule>  minocycline 100 milliGRAM(s) Oral two times a day  mirtazapine 7.5 milliGRAM(s) Oral at bedtime  pantoprazole   Suspension 40 milliGRAM(s) Oral before breakfast  QUEtiapine 25 milliGRAM(s) Oral at bedtime  sertraline 50 milliGRAM(s) Oral daily  sodium chloride 0.9%. 250 milliLiter(s) (25 mL/Hr) IV Continuous <Continuous>  tiotropium 18 MICROgram(s) Capsule 1 Capsule(s) Inhalation daily  tiZANidine 4 milliGRAM(s) Oral <User Schedule>      MEDICATIONS  (PRN):  acetaminophen    Suspension .. 680 milliGRAM(s) Oral every 6 hours PRN Temp greater or equal to 38C (100.4F), Mild Pain (1 - 3)  dextrose 40% Gel 15 Gram(s) Oral once PRN Blood Glucose LESS THAN 70 milliGRAM(s)/deciliter  glucagon  Injectable 1 milliGRAM(s) IntraMuscular once PRN Glucose LESS THAN 70 milligrams/deciliter  nystatin Powder 1 Application(s) Topical two times a day PRN rash/itchiness       ---___---___---___---___---___---     <<<REVIEW OF SYSTEM: >>>    unable to obtain      ---___---___---___---___---___---          <<<  VITAL SIGNS: >>>    T(F): 98.2 (12-26-18 @ 10:37), Max: 100.8 (12-26-18 @ 01:52)  HR: 89 (12-26-18 @ 10:37) (89 - 102)  BP: 99/58 (12-26-18 @ 10:37) (98/68 - 130/74)  RR: 17 (12-26-18 @ 10:37) (16 - 18)  SpO2: 96% (12-26-18 @ 10:37) (95% - 97%)  Wt(kg): --  CAPILLARY BLOOD GLUCOSE      POCT Blood Glucose.: 128 mg/dL (26 Dec 2018 06:21)    I&O's Summary    25 Dec 2018 07:01  -  26 Dec 2018 07:00  --------------------------------------------------------  IN: 640 mL / OUT: 300 mL / NET: 340 mL    26 Dec 2018 07:01  -  26 Dec 2018 10:40  --------------------------------------------------------  IN: 0 mL / OUT: 0 mL / NET: 0 mL         ---___---___---___---___---___---                       PHYSICAL EXAM:    GEN: A&O X 0, NAD , comfortable  HEENT: NCAT, PERRL, MMM, no scleral icterus, hearing intact  NECK: Supple, No JVD  CVS: S1S2 , regular , No M/R/G appreciated  PULM: CTA B/L,  no W/R/R appreciated  ABD.: soft. non tender, non distended,  bowel sounds present  Extrem: contractures noted   Derm: No rash or ecchymosis noted  PSYCH:  mood,  depression, anxious     ---___---___---___---___---___---     <<<  LAB AND IMAGING: >>>                          9.2    14.12 )-----------( 376      ( 26 Dec 2018 08:12 )             28.5               12-26    142  |  105  |  75<H>  ----------------------------<  115<H>  4.1   |  22  |  0.75    Ca    9.8      26 Dec 2018 06:50                                 [All pertinent / recent available Imaging reports and other labs reviewed]     ---___---___---___---___---___---___ ---___---___---___---___---           <<<  A S S E S S M E N T   A N YE   P L A N :  >>>          -GI/DVT Prophylaxis.    --------------------------------------------  Case discussed with   Education given on     >>______________________<<      Deniz Bolden .         phone   [phone]

## 2018-12-26 NOTE — PROGRESS NOTE ADULT - ASSESSMENT
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  The differential of fever includes infection,malignancy as well as inflammatory fever.  Known DVT in leg.  No clear source of infection  Recent CT of C/A/P as are cultures and CDT  Will consider antifungal therapy but with no  obstruction it is uncommon to see sytemic candidal infections from the urine  Plan:   continue supportive care as per primary medical team   with repeat blood cultures and C.Diff toxin negative I would not make any changes at this point  My bias would be to support her medically and try to keep comfortable  Will continue to look for infection but she seems to be tolerating 1 month of fever well.  Leukocytosis remains unexplained for now.  ID issues reviewed with her daughter at bedside    [place] guarded

## 2018-12-26 NOTE — PROGRESS NOTE ADULT - SUBJECTIVE AND OBJECTIVE BOX
HPI:  Patient seen and examined with her daughter at the bedside.  Ongoing low grade fevers.  WBC more elevated today after change in steroid dosing (per rheumatology recommendations).    Review Of Systems:         unable to assess in setting of advanced dementia    Medications:  acetaminophen    Suspension .. 680 milliGRAM(s) Oral every 6 hours PRN  ALBUTerol/ipratropium for Nebulization 3 milliLiter(s) Nebulizer every 6 hours  apixaban 5 milliGRAM(s) Oral every 12 hours  AQUAPHOR (petrolatum Ointment) 1 Application(s) Topical three times a day  buDESOnide 160 MICROgram(s)/formoterol 4.5 MICROgram(s) Inhaler 2 Puff(s) Inhalation two times a day  clobetasol 0.05% Ointment 1 Application(s) Topical two times a day  clonazePAM Tablet 0.5 milliGRAM(s) Oral at bedtime  dextrose 40% Gel 15 Gram(s) Oral once PRN  dextrose 5%. 1000 milliLiter(s) IV Continuous <Continuous>  dextrose 50% Injectable 12.5 Gram(s) IV Push once  dextrose 50% Injectable 25 Gram(s) IV Push once  dextrose 50% Injectable 25 Gram(s) IV Push once  fentaNYL   Patch  12 MICROgram(s)/Hr 1 Patch Transdermal every 72 hours  ferrous    sulfate Liquid 300 milliGRAM(s) Enteral Tube daily  gabapentin   Solution 300 milliGRAM(s) Oral two times a day  glucagon  Injectable 1 milliGRAM(s) IntraMuscular once PRN  hydrocortisone 30 milliGRAM(s) Oral <User Schedule>  insulin lispro (HumaLOG) corrective regimen sliding scale   SubCutaneous every 6 hours  lactobacillus acidophilus 1 Tablet(s) Oral daily  lisinopril 5 milliGRAM(s) Oral daily  medical marijuana oil 1 milliLiter(s),medical marijuana oil 1 milliLiter(s),medical marijuana oil 1 milliLiter(s) 1 milliLiter(s) SubLingual <User Schedule>  minocycline 100 milliGRAM(s) Oral two times a day  mirtazapine 7.5 milliGRAM(s) Oral at bedtime  nystatin Powder 1 Application(s) Topical two times a day PRN  pantoprazole   Suspension 40 milliGRAM(s) Oral before breakfast  QUEtiapine 25 milliGRAM(s) Oral at bedtime  sertraline 50 milliGRAM(s) Oral daily  sodium chloride 0.9%. 250 milliLiter(s) IV Continuous <Continuous>  tiotropium 18 MICROgram(s) Capsule 1 Capsule(s) Inhalation daily  tiZANidine 4 milliGRAM(s) Oral <User Schedule>    PAST MEDICAL & SURGICAL HISTORY:  CVA (cerebral vascular accident)  Fatty pancreas  PNA (pneumonia)  Pulmonary HTN  IGT (impaired glucose tolerance)  Ulcerative colitis  Acid reflux  Anxiety  Depression  Mouth sores  HLD (hyperlipidemia)  Asthma  Humeral head fracture  H/O: hysterectomy  H/O cataract extraction, left  History of knee replacement    Vitals:  T(C): 37.6 (12-26-18 @ 18:09), Max: 38.2 (12-26-18 @ 01:52)  HR: 81 (12-26-18 @ 20:56) (81 - 100)  BP: 114/72 (12-26-18 @ 20:56) (98/68 - 122/62)  BP(mean): --  RR: 20 (12-26-18 @ 20:56) (16 - 20)  SpO2: 96% (12-26-18 @ 20:56) (95% - 97%)  Wt(kg): --  Daily     Daily   I&O's Summary    25 Dec 2018 07:01  -  26 Dec 2018 07:00  --------------------------------------------------------  IN: 1380 mL / OUT: 300 mL / NET: 1080 mL    26 Dec 2018 07:01  -  26 Dec 2018 21:35  --------------------------------------------------------  IN: 790 mL / OUT: 300 mL / NET: 490 mL        Physical Exam:  Appearance: appears older than stated age; bedbound, demented; no acute distress  Eyes: PERRL, EOMI, pink conjunctiva  HENT: Normal oral mucosa  Cardiovascular: RRR, S1, S2; no LE edema bilaterally; normal JVP  Respiratory: poor inspiratory effort  Gastrointestinal: soft, non-tender, non-distended with normal bowel sounds; +PEG  Musculoskeletal: Bedbound, contracted  Neurologic: cranial nerves grossly intact  Lymphatic: No lymphadenopathy  Psychiatry: awake and alert  Skin: No rashes, ecchymoses, or cyanosis                              10.4   20.7  )-----------( 366      ( 26 Dec 2018 12:12 )             31.4     12-26    142  |  105  |  75<H>  ----------------------------<  115<H>  4.1   |  22  |  0.75    Ca    9.8      26 Dec 2018 06:50            Echo: < from: Transthoracic Echocardiogram (12.03.18 @ 11:23) >  ------------------------------------------------------------------------  Conclusions:  1. Mitral annular calcification, otherwise normal mitral  valve. Moderate-severe mitral regurgitation (vena contracta  0.8 cm)  2. Calcified trileafletaortic valve with normal opening.  Peak transaortic valve gradient equals 3 mm Hg, mean  transaortic valve gradient equals 1 mm Hg, aortic valve  velocity time integral equals 13 cm. Moderate aortic  regurgitation  3. Moderately dilated left atrium.LA volume index = 45  cc/m2.  4. Severe global left ventricular systolic dysfunction.  5. Increased E/e'  is consistent with elevated left  ventricular filling pressure.  6. Moderate right atrial enlargement. Inferior vena cava is  dilated (>=2.5cm) with normal respiratory variability  consistent with right atrial pressure 16-20mm Hg.  7. Right ventricular enlargement with decreased right  ventricular systolic function.  8. Normal tricuspid valve. Severe tricuspid regurgitation.  9. Estimated pulmonary artery systolic pressure equals 82  mm Hg, assuming right atrial pressure equals 8 mm Hg,  consistent with severe pulmonary pressures.  10. Color Doppler demonstrates evidence of left to right  shunt  ------------------------------------------------------------------------  Confirmed on  12/3/2018 - 16:37:07 by Margaret Khan M.D.  ------------------------------------------------------------------------    < end of copied text >    Imaging: < from: Xray Chest 1 View- PORTABLE-Urgent (12.02.18 @ 17:19) >  IMPRESSION:   Limited study due to patient positioning.  Trace right pleural effusion and likely left pleural effusion. Question   mild pulmonary edema.    HUAN JACOBS M.D., RADIOLOGY RESIDENT  This document has been electronically signed.  TONYA AARON M.D., ATTENDING RADIOLOGIST  This document has been electronically signed. Dec  2 2018  9:17PM      < end of copied text >    < from: [facility] Duplex Lower Ext Vein Scan, Bilat (12.15.18 @ 14:01) >  IMPRESSION:     Acute, above the knee deep venous thrombosis extending from the right   common femoral vein through the popliteal vein and into the right   gastrocnemius vein.    No left lower extremity DVT.    The above findings were discussed with NP Connor by Dr. Tapia on   12/15/2018 2:43 PM, with read-back verification.    YELENA TAPIA M.D., RADIOLOGY RESIDENT  This document has been electronically signed.  PRIMO MURILLO M.D., ATTENDING RADIOLOGIST  This document has been electronically signed. Dec 15 2018  3:55PM        < end of copied text >    < from: NM Pulmonary Ventilation/Perfusion Scan (12.15.18 @ 10:13) >  FINDINGS: There is heterogeneous distribution of radiopharmaceutical in   both lungs on the ventilation and perfusion images. There are no   segmental perfusion defects in either lung. No significant interval   change since the previous study of 12/3/2018.    IMPRESSION: Very low probability of pulmonary embolus.    KRYSTYNA JULIAN M.D., CHIEF OF NUCLEAR MEDICINE  This document has been electronically signed. Dec 15 2018 10:15AM    < end of copied text >    < from: CT Abdomen and Pelvis No Cont (12.22.18 @ 15:14) >  FINDINGS:    LOWER CHEST: Cardiomegaly.    LIVER: Within normal limits.  BILE DUCTS: Normal caliber.  GALLBLADDER: Cholecystectomy.  SPLEEN: Within normal limits.  PANCREAS: Within normal limits.  ADRENALS: Within normal limits.  KIDNEYS/URETERS: Within normal limits.    BLADDER: Collapsed with a Blackman catheter.  REPRODUCTIVE ORGANS: Limited evaluation secondary to extensive streak   artifact from right lower extremity hardware    BOWEL: Gastrostomy balloon within the stomach. No bowel obstruction.   Appendix is not visualized.  PERITONEUM: No ascites.  VESSELS:  Atheromatous disease of the abdominal aorta.  RETROPERITONEUM: No lymphadenopathy.    ABDOMINAL WALL: Anasarca.  BONES: Degenerative changes of the spine. Complete vertebral body height   loss of L2. Total right hip arthroplasty. Chronic bilateral rib fractures   and left acetabular fracture. Mild anterolisthesis of L4 over L5,   unchanged compared to prior exam of 8/27/2018.    IMPRESSION:    No acute intra-abdominal pathology or collection.    AMIRA GUTIERREZ M.D., RADIOLOGY RESIDENT  This document has been electronically signed.  MARKEL SIMMONS M.D., ATTENDING RADIOLOGIST  This document has been electronically signed. Dec 22 2018  4:52PM    < end of copied text >    Interpretation of Telemetry: Patient no longer on tele

## 2018-12-26 NOTE — PROGRESS NOTE ADULT - SUBJECTIVE AND OBJECTIVE BOX
CC: f/u for fever    Patient reports: she is lethargic,tolerating enteral feeds, no new events    REVIEW OF SYSTEMS:  All other review of systems negative (Comprehensive ROS)    Antimicrobials Day #  :  minocycline 100 milliGRAM(s) Oral two times a day    Other Medications Reviewed    T(F): 100.4 (12-26-18 @ 16:58), Max: 100.8 (12-26-18 @ 01:52)  HR: 89 (12-26-18 @ 10:37)  BP: 99/58 (12-26-18 @ 10:37)  RR: 17 (12-26-18 @ 10:37)  SpO2: 96% (12-26-18 @ 10:37)  Wt(kg): --    PHYSICAL EXAM:  General: alert, no acute distress, chronically debilitated  Eyes:  anicteric, no conjunctival injection, no discharge  Oropharynx: no lesions or injection 	  Neck: supple, without adenopathy  Lungs: clear to auscultation, diminished at bases  Heart: regular rate and rhythm; no murmur, rubs or gallops  Abdomen: soft, nondistended, nontender, without mass or organomegaly  Skin: no lesions  Extremities: contracted  Neurologic: poorly interactrive    LAB RESULTS:                        10.4   20.7  )-----------( 366      ( 26 Dec 2018 12:12 )             31.4     12-26    142  |  105  |  75<H>  ----------------------------<  115<H>  4.1   |  22  |  0.75    Ca    9.8      26 Dec 2018 06:50          MICROBIOLOGY:  RECENT CULTURES:  12-25 @ 08:21 .Stool Feces     No enteric pathogens to date: Final culture pending      12-23 @ 17:17 .Blood Blood-Peripheral     No growth to date.          RADIOLOGY REVIEWED:    < from: CT Abdomen and Pelvis No Cont (12.22.18 @ 15:14) >  IMPRESSION:    No acute intra-abdominal pathology or collection.    < end of copied text >

## 2018-12-26 NOTE — PROGRESS NOTE ADULT - ASSESSMENT
fever   dvt   chf   asthma  depression   anxiety       rheumatology consult ongoing   continue Eliquis   continue lisinopril  dcontinue budesonide    continue klonopin reemeron and zoloft   as well as eroqul for belgica godoy cbc for lowering h/h

## 2018-12-27 LAB
ANA PAT FLD IF-IMP: ABNORMAL
ANA TITR SER: ABNORMAL
ANION GAP SERPL CALC-SCNC: 16 MMOL/L — SIGNIFICANT CHANGE UP (ref 5–17)
BUN SERPL-MCNC: 74 MG/DL — HIGH (ref 7–23)
CALCIUM SERPL-MCNC: 10 MG/DL — SIGNIFICANT CHANGE UP (ref 8.4–10.5)
CENTROMERE AB SER-ACNC: >8 AI — HIGH
CHLORIDE SERPL-SCNC: 103 MMOL/L — SIGNIFICANT CHANGE UP (ref 96–108)
CO2 SERPL-SCNC: 22 MMOL/L — SIGNIFICANT CHANGE UP (ref 22–31)
CREAT SERPL-MCNC: 0.65 MG/DL — SIGNIFICANT CHANGE UP (ref 0.5–1.3)
CULTURE RESULTS: SIGNIFICANT CHANGE UP
ENA SCL70 AB SER-ACNC: <0.2 AI — SIGNIFICANT CHANGE UP
GLUCOSE BLDC GLUCOMTR-MCNC: 123 MG/DL — HIGH (ref 70–99)
GLUCOSE BLDC GLUCOMTR-MCNC: 166 MG/DL — HIGH (ref 70–99)
GLUCOSE BLDC GLUCOMTR-MCNC: 178 MG/DL — HIGH (ref 70–99)
GLUCOSE BLDC GLUCOMTR-MCNC: 87 MG/DL — SIGNIFICANT CHANGE UP (ref 70–99)
GLUCOSE SERPL-MCNC: 117 MG/DL — HIGH (ref 70–99)
HCT VFR BLD CALC: 30.4 % — LOW (ref 34.5–45)
HGB BLD-MCNC: 9.8 G/DL — LOW (ref 11.5–15.5)
MCHC RBC-ENTMCNC: 30.3 PG — SIGNIFICANT CHANGE UP (ref 27–34)
MCHC RBC-ENTMCNC: 32.2 GM/DL — SIGNIFICANT CHANGE UP (ref 32–36)
MCV RBC AUTO: 94.1 FL — SIGNIFICANT CHANGE UP (ref 80–100)
PLATELET # BLD AUTO: 365 K/UL — SIGNIFICANT CHANGE UP (ref 150–400)
POTASSIUM SERPL-MCNC: 3.7 MMOL/L — SIGNIFICANT CHANGE UP (ref 3.5–5.3)
POTASSIUM SERPL-SCNC: 3.7 MMOL/L — SIGNIFICANT CHANGE UP (ref 3.5–5.3)
RBC # BLD: 3.23 M/UL — LOW (ref 3.8–5.2)
RBC # FLD: 15.9 % — HIGH (ref 10.3–14.5)
SODIUM SERPL-SCNC: 141 MMOL/L — SIGNIFICANT CHANGE UP (ref 135–145)
SPECIMEN SOURCE: SIGNIFICANT CHANGE UP
WBC # BLD: 13.02 K/UL — HIGH (ref 3.8–10.5)
WBC # FLD AUTO: 13.02 K/UL — HIGH (ref 3.8–10.5)

## 2018-12-27 PROCEDURE — 99233 SBSQ HOSP IP/OBS HIGH 50: CPT

## 2018-12-27 PROCEDURE — 99232 SBSQ HOSP IP/OBS MODERATE 35: CPT | Mod: GC

## 2018-12-27 RX ADMIN — TIZANIDINE 4 MILLIGRAM(S): 4 TABLET ORAL at 09:19

## 2018-12-27 RX ADMIN — SERTRALINE 50 MILLIGRAM(S): 25 TABLET, FILM COATED ORAL at 12:06

## 2018-12-27 RX ADMIN — Medication 680 MILLIGRAM(S): at 03:06

## 2018-12-27 RX ADMIN — LISINOPRIL 5 MILLIGRAM(S): 2.5 TABLET ORAL at 06:16

## 2018-12-27 RX ADMIN — TIZANIDINE 4 MILLIGRAM(S): 4 TABLET ORAL at 21:04

## 2018-12-27 RX ADMIN — FENTANYL CITRATE 1 PATCH: 50 INJECTION INTRAVENOUS at 18:17

## 2018-12-27 RX ADMIN — Medication 3 MILLILITER(S): at 12:06

## 2018-12-27 RX ADMIN — MINOCYCLINE HYDROCHLORIDE 100 MILLIGRAM(S): 45 TABLET, EXTENDED RELEASE ORAL at 18:32

## 2018-12-27 RX ADMIN — MIRTAZAPINE 7.5 MILLIGRAM(S): 45 TABLET, ORALLY DISINTEGRATING ORAL at 21:04

## 2018-12-27 RX ADMIN — Medication 1 TABLET(S): at 12:06

## 2018-12-27 RX ADMIN — Medication 1 APPLICATION(S): at 06:17

## 2018-12-27 RX ADMIN — QUETIAPINE FUMARATE 25 MILLIGRAM(S): 200 TABLET, FILM COATED ORAL at 21:04

## 2018-12-27 RX ADMIN — Medication 680 MILLIGRAM(S): at 15:21

## 2018-12-27 RX ADMIN — FENTANYL CITRATE 1 PATCH: 50 INJECTION INTRAVENOUS at 21:36

## 2018-12-27 RX ADMIN — FENTANYL CITRATE 1 PATCH: 50 INJECTION INTRAVENOUS at 07:17

## 2018-12-27 RX ADMIN — Medication 3 MILLILITER(S): at 18:32

## 2018-12-27 RX ADMIN — APIXABAN 5 MILLIGRAM(S): 2.5 TABLET, FILM COATED ORAL at 18:32

## 2018-12-27 RX ADMIN — Medication 1 APPLICATION(S): at 18:33

## 2018-12-27 RX ADMIN — BUDESONIDE AND FORMOTEROL FUMARATE DIHYDRATE 2 PUFF(S): 160; 4.5 AEROSOL RESPIRATORY (INHALATION) at 21:06

## 2018-12-27 RX ADMIN — Medication 0.5 MILLIGRAM(S): at 21:04

## 2018-12-27 RX ADMIN — MINOCYCLINE HYDROCHLORIDE 100 MILLIGRAM(S): 45 TABLET, EXTENDED RELEASE ORAL at 06:16

## 2018-12-27 RX ADMIN — GABAPENTIN 300 MILLIGRAM(S): 400 CAPSULE ORAL at 06:17

## 2018-12-27 RX ADMIN — Medication 1: at 12:06

## 2018-12-27 RX ADMIN — BUDESONIDE AND FORMOTEROL FUMARATE DIHYDRATE 2 PUFF(S): 160; 4.5 AEROSOL RESPIRATORY (INHALATION) at 09:19

## 2018-12-27 RX ADMIN — TIOTROPIUM BROMIDE 1 CAPSULE(S): 18 CAPSULE ORAL; RESPIRATORY (INHALATION) at 12:06

## 2018-12-27 RX ADMIN — Medication 3 MILLILITER(S): at 00:06

## 2018-12-27 RX ADMIN — Medication 1 APPLICATION(S): at 13:30

## 2018-12-27 RX ADMIN — Medication 1: at 18:32

## 2018-12-27 RX ADMIN — Medication 3 MILLILITER(S): at 06:16

## 2018-12-27 RX ADMIN — Medication 1 APPLICATION(S): at 22:53

## 2018-12-27 RX ADMIN — Medication 1 APPLICATION(S): at 00:13

## 2018-12-27 RX ADMIN — Medication 300 MILLIGRAM(S): at 12:07

## 2018-12-27 RX ADMIN — Medication 680 MILLIGRAM(S): at 02:06

## 2018-12-27 RX ADMIN — PANTOPRAZOLE SODIUM 40 MILLIGRAM(S): 20 TABLET, DELAYED RELEASE ORAL at 06:16

## 2018-12-27 RX ADMIN — Medication 680 MILLIGRAM(S): at 16:21

## 2018-12-27 RX ADMIN — FENTANYL CITRATE 1 PATCH: 50 INJECTION INTRAVENOUS at 21:43

## 2018-12-27 RX ADMIN — Medication 30 MILLIGRAM(S): at 09:19

## 2018-12-27 RX ADMIN — GABAPENTIN 50 MILLIGRAM(S): 400 CAPSULE ORAL at 21:38

## 2018-12-27 RX ADMIN — APIXABAN 5 MILLIGRAM(S): 2.5 TABLET, FILM COATED ORAL at 06:16

## 2018-12-27 NOTE — PROGRESS NOTE ADULT - ASSESSMENT
68 year-old woman with Alzheimer's dementia seen to have volume overload and biventricular congestive heart failure.  TTE earlier on this admission shows reduced LVEF, elevated pulmonary pressures.  Given severely reduced LVEF, plan to continue ACEi and uptitrate as blood pressure permits. Currently on hold in light of hypotension.  Will defer starting beta-blocker in this patient as she has baseline restrictive airway disease and is beta-agonist inhalers.  Continue anticoagulation with apixaban 5mg BID for patient with age < 80 years and creatinine < 1.5 mg/dL.    Patient with recurrent fevers of unclear etiology. RLE DVT could be etiology of fevers.  Would consider repeat RVP/flu swab.    ID input appreciated.  Rheumatology consult appreciated.

## 2018-12-27 NOTE — PROGRESS NOTE ADULT - SUBJECTIVE AND OBJECTIVE BOX
MYRIAM HEATH  [id number]    INTERVAL HPI/OVERNIGHT EVENTS:   Fevers ongoing, Tmax 100.9 this AM.    MEDICATIONS  (STANDING):  ALBUTerol/ipratropium for Nebulization 3 milliLiter(s) Nebulizer every 6 hours  apixaban 5 milliGRAM(s) Oral every 12 hours  AQUAPHOR (petrolatum Ointment) 1 Application(s) Topical three times a day  buDESOnide 160 MICROgram(s)/formoterol 4.5 MICROgram(s) Inhaler 2 Puff(s) Inhalation two times a day  clobetasol 0.05% Ointment 1 Application(s) Topical two times a day  clonazePAM Tablet 0.5 milliGRAM(s) Oral at bedtime  dextrose 5%. 1000 milliLiter(s) (50 mL/Hr) IV Continuous <Continuous>  dextrose 50% Injectable 12.5 Gram(s) IV Push once  dextrose 50% Injectable 25 Gram(s) IV Push once  dextrose 50% Injectable 25 Gram(s) IV Push once  fentaNYL   Patch  12 MICROgram(s)/Hr 1 Patch Transdermal every 72 hours  ferrous    sulfate Liquid 300 milliGRAM(s) Enteral Tube daily  gabapentin   Solution 300 milliGRAM(s) Oral two times a day  hydrocortisone 30 milliGRAM(s) Oral <User Schedule>  insulin lispro (HumaLOG) corrective regimen sliding scale   SubCutaneous every 6 hours  lactobacillus acidophilus 1 Tablet(s) Oral daily  lisinopril 5 milliGRAM(s) Oral daily  medical marijuana oil 1 milliLiter(s),medical marijuana oil 1 milliLiter(s),medical marijuana oil 1 milliLiter(s) 1 milliLiter(s) SubLingual <User Schedule>  minocycline 100 milliGRAM(s) Oral two times a day  mirtazapine 7.5 milliGRAM(s) Oral at bedtime  pantoprazole   Suspension 40 milliGRAM(s) Oral before breakfast  QUEtiapine 25 milliGRAM(s) Oral at bedtime  sertraline 50 milliGRAM(s) Oral daily  sodium chloride 0.9%. 250 milliLiter(s) (25 mL/Hr) IV Continuous <Continuous>  tiotropium 18 MICROgram(s) Capsule 1 Capsule(s) Inhalation daily  tiZANidine 4 milliGRAM(s) Oral <User Schedule>    MEDICATIONS  (PRN):  acetaminophen    Suspension .. 680 milliGRAM(s) Oral every 6 hours PRN Temp greater or equal to 38C (100.4F), Mild Pain (1 - 3)  dextrose 40% Gel 15 Gram(s) Oral once PRN Blood Glucose LESS THAN 70 milliGRAM(s)/deciliter  glucagon  Injectable 1 milliGRAM(s) IntraMuscular once PRN Glucose LESS THAN 70 milligrams/deciliter  nystatin Powder 1 Application(s) Topical two times a day PRN rash/itchiness      Allergies    ASA; dye contrast (Anaphylaxis)  aspirin (Short breath)  divalproex sodium (Other (Unknown))  Haldol (Other (Unknown))  penicillin (Short breath; Rash)  sulfa drugs (Short breath; Rash)  vancomycin (Rash; Urticaria; Hives)  Xanax (Other (Unknown))    Intolerances        Review of Systems:   Pt non-verbal, unable to obtain.      Vital Signs Last 24 Hrs  T(C): 37 (27 Dec 2018 10:14), Max: 38.3 (27 Dec 2018 02:02)  T(F): 98.6 (27 Dec 2018 10:14), Max: 100.9 (27 Dec 2018 02:02)  HR: 84 (27 Dec 2018 10:14) (81 - 97)  BP: 124/86 (27 Dec 2018 10:14) (114/72 - 134/83)  BP(mean): --  RR: 18 (27 Dec 2018 10:14) (18 - 20)  SpO2: 96% (27 Dec 2018 10:14) (96% - 96%)    Physical Exam:  General: NAD  HEENT: EOMI, MMM  Cardio: +S1/S2, RRR  Resp: CTA b/l  GI: +BS, soft, NT/ND  MSK:  Neuro: AAOx3  muscle strength RUE LUE; RLE LLE     LABS:                        9.8    13.02 )-----------( 365      ( 27 Dec 2018 08:03 )             30.4     12-27    141  |  103  |  74<H>  ----------------------------<  117<H>  3.7   |  22  |  0.65    Ca    10.0      27 Dec 2018 06:23              RADIOLOGY & ADDITIONAL TESTS: MYRIAM HEATH  [id number]    INTERVAL HPI/OVERNIGHT EVENTS:   Fevers ongoing, Tmax 100.9 this AM.    MEDICATIONS  (STANDING):  ALBUTerol/ipratropium for Nebulization 3 milliLiter(s) Nebulizer every 6 hours  apixaban 5 milliGRAM(s) Oral every 12 hours  AQUAPHOR (petrolatum Ointment) 1 Application(s) Topical three times a day  buDESOnide 160 MICROgram(s)/formoterol 4.5 MICROgram(s) Inhaler 2 Puff(s) Inhalation two times a day  clobetasol 0.05% Ointment 1 Application(s) Topical two times a day  clonazePAM Tablet 0.5 milliGRAM(s) Oral at bedtime  dextrose 5%. 1000 milliLiter(s) (50 mL/Hr) IV Continuous <Continuous>  dextrose 50% Injectable 12.5 Gram(s) IV Push once  dextrose 50% Injectable 25 Gram(s) IV Push once  dextrose 50% Injectable 25 Gram(s) IV Push once  fentaNYL   Patch  12 MICROgram(s)/Hr 1 Patch Transdermal every 72 hours  ferrous    sulfate Liquid 300 milliGRAM(s) Enteral Tube daily  gabapentin   Solution 300 milliGRAM(s) Oral two times a day  hydrocortisone 30 milliGRAM(s) Oral <User Schedule>  insulin lispro (HumaLOG) corrective regimen sliding scale   SubCutaneous every 6 hours  lactobacillus acidophilus 1 Tablet(s) Oral daily  lisinopril 5 milliGRAM(s) Oral daily  medical marijuana oil 1 milliLiter(s),medical marijuana oil 1 milliLiter(s),medical marijuana oil 1 milliLiter(s) 1 milliLiter(s) SubLingual <User Schedule>  minocycline 100 milliGRAM(s) Oral two times a day  mirtazapine 7.5 milliGRAM(s) Oral at bedtime  pantoprazole   Suspension 40 milliGRAM(s) Oral before breakfast  QUEtiapine 25 milliGRAM(s) Oral at bedtime  sertraline 50 milliGRAM(s) Oral daily  sodium chloride 0.9%. 250 milliLiter(s) (25 mL/Hr) IV Continuous <Continuous>  tiotropium 18 MICROgram(s) Capsule 1 Capsule(s) Inhalation daily  tiZANidine 4 milliGRAM(s) Oral <User Schedule>    MEDICATIONS  (PRN):  acetaminophen    Suspension .. 680 milliGRAM(s) Oral every 6 hours PRN Temp greater or equal to 38C (100.4F), Mild Pain (1 - 3)  dextrose 40% Gel 15 Gram(s) Oral once PRN Blood Glucose LESS THAN 70 milliGRAM(s)/deciliter  glucagon  Injectable 1 milliGRAM(s) IntraMuscular once PRN Glucose LESS THAN 70 milligrams/deciliter  nystatin Powder 1 Application(s) Topical two times a day PRN rash/itchiness      Allergies    ASA; dye contrast (Anaphylaxis)  aspirin (Short breath)  divalproex sodium (Other (Unknown))  Haldol (Other (Unknown))  penicillin (Short breath; Rash)  sulfa drugs (Short breath; Rash)  vancomycin (Rash; Urticaria; Hives)  Xanax (Other (Unknown))    Intolerances        Review of Systems:   Pt non-verbal, unable to obtain.      Vital Signs Last 24 Hrs  T(C): 37 (27 Dec 2018 10:14), Max: 38.3 (27 Dec 2018 02:02)  T(F): 98.6 (27 Dec 2018 10:14), Max: 100.9 (27 Dec 2018 02:02)  HR: 84 (27 Dec 2018 10:14) (81 - 97)  BP: 124/86 (27 Dec 2018 10:14) (114/72 - 134/83)  BP(mean): --  RR: 18 (27 Dec 2018 10:14) (18 - 20)  SpO2: 96% (27 Dec 2018 10:14) (96% - 96%)    Physical Exam:  General: No apparent distress, contracted  CVS: RRR  Resp: CTA anteriorly; no wheezes or crackles  GI: Soft, NT/ND +BS; PEG tube in place - no drainage  MSK: no joint swelling or tenderness, ROM limited to contractures  Neuro: non-verbal  Skin: no visible rashes     LABS:                        9.8    13.02 )-----------( 365      ( 27 Dec 2018 08:03 )             30.4     12-27    141  |  103  |  74<H>  ----------------------------<  117<H>  3.7   |  22  |  0.65    Ca    10.0      27 Dec 2018 06:23              RADIOLOGY & ADDITIONAL TESTS: MYRIAM HEATH  [id number]    INTERVAL HPI/OVERNIGHT EVENTS:   Fevers ongoing, Tmax 100.9 this AM.  patient unable to give information.    caretaker at bedside.     MEDICATIONS  (STANDING):  ALBUTerol/ipratropium for Nebulization 3 milliLiter(s) Nebulizer every 6 hours  apixaban 5 milliGRAM(s) Oral every 12 hours  AQUAPHOR (petrolatum Ointment) 1 Application(s) Topical three times a day  buDESOnide 160 MICROgram(s)/formoterol 4.5 MICROgram(s) Inhaler 2 Puff(s) Inhalation two times a day  clobetasol 0.05% Ointment 1 Application(s) Topical two times a day  clonazePAM Tablet 0.5 milliGRAM(s) Oral at bedtime  dextrose 5%. 1000 milliLiter(s) (50 mL/Hr) IV Continuous <Continuous>  dextrose 50% Injectable 12.5 Gram(s) IV Push once  dextrose 50% Injectable 25 Gram(s) IV Push once  dextrose 50% Injectable 25 Gram(s) IV Push once  fentaNYL   Patch  12 MICROgram(s)/Hr 1 Patch Transdermal every 72 hours  ferrous    sulfate Liquid 300 milliGRAM(s) Enteral Tube daily  gabapentin   Solution 300 milliGRAM(s) Oral two times a day  hydrocortisone 30 milliGRAM(s) Oral <User Schedule>  insulin lispro (HumaLOG) corrective regimen sliding scale   SubCutaneous every 6 hours  lactobacillus acidophilus 1 Tablet(s) Oral daily  lisinopril 5 milliGRAM(s) Oral daily  medical marijuana oil 1 milliLiter(s),medical marijuana oil 1 milliLiter(s),medical marijuana oil 1 milliLiter(s) 1 milliLiter(s) SubLingual <User Schedule>  minocycline 100 milliGRAM(s) Oral two times a day  mirtazapine 7.5 milliGRAM(s) Oral at bedtime  pantoprazole   Suspension 40 milliGRAM(s) Oral before breakfast  QUEtiapine 25 milliGRAM(s) Oral at bedtime  sertraline 50 milliGRAM(s) Oral daily  sodium chloride 0.9%. 250 milliLiter(s) (25 mL/Hr) IV Continuous <Continuous>  tiotropium 18 MICROgram(s) Capsule 1 Capsule(s) Inhalation daily  tiZANidine 4 milliGRAM(s) Oral <User Schedule>    MEDICATIONS  (PRN):  acetaminophen    Suspension .. 680 milliGRAM(s) Oral every 6 hours PRN Temp greater or equal to 38C (100.4F), Mild Pain (1 - 3)  dextrose 40% Gel 15 Gram(s) Oral once PRN Blood Glucose LESS THAN 70 milliGRAM(s)/deciliter  glucagon  Injectable 1 milliGRAM(s) IntraMuscular once PRN Glucose LESS THAN 70 milligrams/deciliter  nystatin Powder 1 Application(s) Topical two times a day PRN rash/itchiness      Allergies    ASA; dye contrast (Anaphylaxis)  aspirin (Short breath)  divalproex sodium (Other (Unknown))  Haldol (Other (Unknown))  penicillin (Short breath; Rash)  sulfa drugs (Short breath; Rash)  vancomycin (Rash; Urticaria; Hives)  Xanax (Other (Unknown))    Intolerances        Review of Systems:   Pt non-verbal, unable to obtain.      Vital Signs Last 24 Hrs  T(C): 37 (27 Dec 2018 10:14), Max: 38.3 (27 Dec 2018 02:02)  T(F): 98.6 (27 Dec 2018 10:14), Max: 100.9 (27 Dec 2018 02:02)  HR: 84 (27 Dec 2018 10:14) (81 - 97)  BP: 124/86 (27 Dec 2018 10:14) (114/72 - 134/83)  BP(mean): --  RR: 18 (27 Dec 2018 10:14) (18 - 20)  SpO2: 96% (27 Dec 2018 10:14) (96% - 96%)    Physical Exam:  General: No apparent distress, contracted  CVS: RRR  Resp: CTA anteriorly; no wheezes or crackles  GI: Soft, NT/ND +BS; PEG tube in place - no drainage  MSK: no joint swelling or tenderness, ROM limited to contractures  Neuro: non-verbal  Skin: no visible rashes no skin thickening     LABS:                        9.8    13.02 )-----------( 365      ( 27 Dec 2018 08:03 )             30.4     12-27    141  |  103  |  74<H>  ----------------------------<  117<H>  3.7   |  22  |  0.65    Ca    10.0      27 Dec 2018 06:23              RADIOLOGY & ADDITIONAL TESTS:    from: Transthoracic Echocardiogram (12.03.18 @ 11:23)  Conclusions:  1. Mitral annular calcification, otherwise normal mitral  valve. Moderate-severe mitral regurgitation (vena contracta  0.8 cm)  2. Calcified trileafletaortic valve with normal opening.  Peak transaortic valve gradient equals 3 mm Hg, mean  transaortic valve gradient equals 1 mm Hg, aortic valve  velocity time integral equals 13 cm. Moderate aortic  regurgitation  3. Moderately dilated left atrium.LA volume index = 45  cc/m2.  4. Severe global left ventricular systolic dysfunction.  5. Increased E/e'  is consistent with elevated left  ventricular filling pressure.  6. Moderate right atrial enlargement. Inferior vena cava is  dilated (>=2.5cm) with normal respiratory variability  consistent with right atrial pressure 16-20mm Hg.  7. Right ventricular enlargement with decreased right  ventricular systolic function.  8. Normal tricuspid valve. Severe tricuspid regurgitation.  9. Estimated pulmonary artery systolic pressure equals 82  mm Hg, assuming right atrial pressure equals 8 mm Hg,  consistent with severe pulmonary pressures.  10. Color Doppler demonstrates evidence of left to right  shunt      < from: CT Chest No Cont (11.17.18 @ 20:47) >  EXAM:  CT CHEST                            PROCEDURE DATE:  11/17/2018            INTERPRETATION:  CLINICAL INFORMATION: Shortness of breath. Asthma and   pulmonary hypertension.    COMPARISON: Chest CT from 7/26/2018.    PROCEDURE:   CT of the Chest was performed without intravenous contrast.  Sagittal and coronal reformats were performed.  MIP reformats were performed.    Examination limited by motion artifacts.    FINDINGS:    LUNGS AND LARGE AIRWAYS: Limited evaluation secondary to respiratory   motion artifact. Significant debris is noted within the right main, left   main, and left lower lobe bronchi. Interlobular septal thickening with   diffuse patchy groundglass opacities.  PLEURA: Small bilateral pleural effusions with associated passive   atelectasis.  VESSELS: Atherosclerotic disease of the aorta and coronary arteries.  HEART: Stable cardiomegaly. No pericardial effusion.  MEDIASTINUM AND DILLAN: The esophagus is filled with debris. No   lymphadenopathy.  CHEST WALL AND LOWER NECK: Anasarca.  VISUALIZED UPPER ABDOMEN: Hepatomegaly.  BONES: Chronic mild anterior T3 compression deformity. Spinal   degenerative changes.    IMPRESSION:     Debris-filled esophagus with secretions in the right main, left main, and   left lower lobar bronchi likely secondary to aspiration.    Moderate pulmonary edema with small bilateral pleural effusions.   Underlying pneumonia cannot be excluded.          < end of copied text >

## 2018-12-27 NOTE — PROGRESS NOTE ADULT - SUBJECTIVE AND OBJECTIVE BOX
HPI:  Ongoing fevers.    Review Of Systems:    Unable to assess due to dementia    Medications:  acetaminophen    Suspension .. 680 milliGRAM(s) Oral every 6 hours PRN  ALBUTerol/ipratropium for Nebulization 3 milliLiter(s) Nebulizer every 6 hours  apixaban 5 milliGRAM(s) Oral every 12 hours  AQUAPHOR (petrolatum Ointment) 1 Application(s) Topical three times a day  buDESOnide 160 MICROgram(s)/formoterol 4.5 MICROgram(s) Inhaler 2 Puff(s) Inhalation two times a day  clobetasol 0.05% Ointment 1 Application(s) Topical two times a day  clonazePAM Tablet 0.5 milliGRAM(s) Oral at bedtime  dextrose 40% Gel 15 Gram(s) Oral once PRN  dextrose 5%. 1000 milliLiter(s) IV Continuous <Continuous>  dextrose 50% Injectable 12.5 Gram(s) IV Push once  dextrose 50% Injectable 25 Gram(s) IV Push once  dextrose 50% Injectable 25 Gram(s) IV Push once  ferrous    sulfate Liquid 300 milliGRAM(s) Enteral Tube daily  gabapentin   Solution 300 milliGRAM(s) Oral two times a day  glucagon  Injectable 1 milliGRAM(s) IntraMuscular once PRN  hydrocortisone 30 milliGRAM(s) Oral <User Schedule>  insulin lispro (HumaLOG) corrective regimen sliding scale   SubCutaneous every 6 hours  lactobacillus acidophilus 1 Tablet(s) Oral daily  lisinopril 5 milliGRAM(s) Oral daily  medical marijuana oil 1 milliLiter(s),medical marijuana oil 1 milliLiter(s),medical marijuana oil 1 milliLiter(s) 1 milliLiter(s) SubLingual <User Schedule>  minocycline 100 milliGRAM(s) Oral two times a day  mirtazapine 7.5 milliGRAM(s) Oral at bedtime  nystatin Powder 1 Application(s) Topical two times a day PRN  pantoprazole   Suspension 40 milliGRAM(s) Oral before breakfast  QUEtiapine 25 milliGRAM(s) Oral at bedtime  sertraline 50 milliGRAM(s) Oral daily  sodium chloride 0.9%. 250 milliLiter(s) IV Continuous <Continuous>  tiotropium 18 MICROgram(s) Capsule 1 Capsule(s) Inhalation daily  tiZANidine 4 milliGRAM(s) Oral <User Schedule>    PAST MEDICAL & SURGICAL HISTORY:  CVA (cerebral vascular accident)  Fatty pancreas  PNA (pneumonia)  Pulmonary HTN  IGT (impaired glucose tolerance)  Ulcerative colitis  Acid reflux  Anxiety  Depression  Mouth sores  HLD (hyperlipidemia)  Asthma  Humeral head fracture  H/O: hysterectomy  H/O cataract extraction, left  History of knee replacement    Vitals:  T(C): 37.8 (18 @ 21:29), Max: 38.3 (18 @ 02:02)  HR: 91 (18 @ 21:29) (82 - 97)  BP: 134/71 (18 @ 21:29) (107/65 - 134/83)  BP(mean): --  RR: 18 (18 @ 21:29) (18 - 19)  SpO2: 93% (18 @ 21:29) (93% - 96%)  Wt(kg): --  Daily     Daily Weight in k.2 (27 Dec 2018 07:17)  I&O's Summary    26 Dec 2018 07:  -  27 Dec 2018 07:00  --------------------------------------------------------  IN: 1430 mL / OUT: 950 mL / NET: 480 mL    27 Dec 2018 07:01  -  27 Dec 2018 21:55  --------------------------------------------------------  IN: 0 mL / OUT: 300 mL / NET: -300 mL        Physical Exam:  Appearance: appears older than stated age; bedbound, demented; no acute distress  Eyes: PERRL, EOMI, pink conjunctiva  HENT: Normal oral mucosa  Cardiovascular: RRR, S1, S2; no LE edema bilaterally; normal JVP  Respiratory: poor inspiratory effort  Gastrointestinal: soft, non-tender, non-distended with normal bowel sounds; +PEG  Musculoskeletal: Bedbound, contracted  Neurologic: cranial nerves grossly intact  Lymphatic: No lymphadenopathy  Psychiatry: awake and alert  Skin: No rashes, ecchymoses, or cyanosis                          9.8    13.02 )-----------( 365      ( 27 Dec 2018 08:03 )             30.4     12-    141  |  103  |  74<H>  ----------------------------<  117<H>  3.7   |  22  |  0.65    Ca    10.0      27 Dec 2018 06:23                  Echo: < from: Transthoracic Echocardiogram (18 @ 11:23) >  ------------------------------------------------------------------------  Conclusions:  1. Mitral annular calcification, otherwise normal mitral  valve. Moderate-severe mitral regurgitation (vena contracta  0.8 cm)  2. Calcified trileafletaortic valve with normal opening.  Peak transaortic valve gradient equals 3 mm Hg, mean  transaortic valve gradient equals 1 mm Hg, aortic valve  velocity time integral equals 13 cm. Moderate aortic  regurgitation  3. Moderately dilated left atrium.LA volume index = 45  cc/m2.  4. Severe global left ventricular systolic dysfunction.  5. Increased E/e'  is consistent with elevated left  ventricular filling pressure.  6. Moderate right atrial enlargement. Inferior vena cava is  dilated (>=2.5cm) with normal respiratory variability  consistent with right atrial pressure 16-20mm Hg.  7. Right ventricular enlargement with decreased right  ventricular systolic function.  8. Normal tricuspid valve. Severe tricuspid regurgitation.  9. Estimated pulmonary artery systolic pressure equals 82  mm Hg, assuming right atrial pressure equals 8 mm Hg,  consistent with severe pulmonary pressures.  10. Color Doppler demonstrates evidence of left to right  shunt  ------------------------------------------------------------------------  Confirmed on  12/3/2018 - 16:37:07 by Margaret Khan M.D.  ------------------------------------------------------------------------    < end of copied text >    Imaging: < from: Xray Chest 1 View- PORTABLE-Urgent (18 @ 17:19) >  IMPRESSION:   Limited study due to patient positioning.  Trace right pleural effusion and likely left pleural effusion. Question   mild pulmonary edema.    HUAN JACOBS M.D., RADIOLOGY RESIDENT  This document has been electronically signed.  TONYA AARON M.D., ATTENDING RADIOLOGIST  This document has been electronically signed. Dec  2 2018  9:17PM      < end of copied text >    < from: [facility] Duplex Lower Ext Vein Scan, Bilat (12.15.18 @ 14:01) >  IMPRESSION:     Acute, above the knee deep venous thrombosis extending from the right   common femoral vein through the popliteal vein and into the right   gastrocnemius vein.    No left lower extremity DVT.    The above findings were discussed with BENJI Ryan by Dr. Tapia on   12/15/2018 2:43 PM, with read-back verification.    YELENA TAPIA M.D., RADIOLOGY RESIDENT  This document has been electronically signed.  PRIMO MURILLO M.D., ATTENDING RADIOLOGIST  This document has been electronically signed. Dec 15 2018  3:55PM        < end of copied text >    < from: [facility] Pulmonary Ventilation/Perfusion Scan (12.15.18 @ 10:13) >  FINDINGS: There is heterogeneous distribution of radiopharmaceutical in   both lungs on the ventilation and perfusion images. There are no   segmental perfusion defects in either lung. No significant interval   change since the previous study of 12/3/2018.    IMPRESSION: Very low probability of pulmonary embolus.    KRYSTYNA JULIAN M.D., CHIEF OF NUCLEAR MEDICINE  This document has been electronically signed. Dec 15 2018 10:15AM    < end of copied text >    < from: CT Abdomen and Pelvis No Cont (18 @ 15:14) >  FINDINGS:    LOWER CHEST: Cardiomegaly.    LIVER: Within normal limits.  BILE DUCTS: Normal caliber.  GALLBLADDER: Cholecystectomy.  SPLEEN: Within normal limits.  PANCREAS: Within normal limits.  ADRENALS: Within normal limits.  KIDNEYS/URETERS: Within normal limits.    BLADDER: Collapsed with a Blackman catheter.  REPRODUCTIVE ORGANS: Limited evaluation secondary to extensive streak   artifact from right lower extremity hardware    BOWEL: Gastrostomy balloon within the stomach. No bowel obstruction.   Appendix is not visualized.  PERITONEUM: No ascites.  VESSELS:  Atheromatous disease of the abdominal aorta.  RETROPERITONEUM: No lymphadenopathy.    ABDOMINAL WALL: Anasarca.  BONES: Degenerative changes of the spine. Complete vertebral body height   loss of L2. Total right hip arthroplasty. Chronic bilateral rib fractures   and left acetabular fracture. Mild anterolisthesis of L4 over L5,   unchanged compared to prior exam of 2018.    IMPRESSION:    No acute intra-abdominal pathology or collection.    AMIRA GUTIERREZ M.D., RADIOLOGY RESIDENT  This document has been electronically signed.  MARKEL SIMMONS M.D., ATTENDING RADIOLOGIST  This document has been electronically signed. Dec 22 2018  4:52PM    < end of copied text >    Interpretation of Telemetry: Patient no longer on tele

## 2018-12-27 NOTE — PROGRESS NOTE ADULT - SUBJECTIVE AND OBJECTIVE BOX
---___---___---___---___---___---___ ---___---___---___---___---___---___---___---___---___---                    <<<  M E D I C A L   A T T E N D I N G    F O L L O W    U P   N O T E  >>>    - Patient seen and examined by me approximately thirty minutes ago.  - In summary, patient is a 68y year old Female who origianlly presented with chf now with dvt and new onset fever         ---___---___---___---___---___---      <<<  MEDICATIONS:  >>>    MEDICATIONS  (STANDING):  ALBUTerol/ipratropium for Nebulization 3 milliLiter(s) Nebulizer every 6 hours  apixaban 5 milliGRAM(s) Oral every 12 hours  AQUAPHOR (petrolatum Ointment) 1 Application(s) Topical three times a day  buDESOnide 160 MICROgram(s)/formoterol 4.5 MICROgram(s) Inhaler 2 Puff(s) Inhalation two times a day  clobetasol 0.05% Ointment 1 Application(s) Topical two times a day  clonazePAM Tablet 0.5 milliGRAM(s) Oral at bedtime  dextrose 5%. 1000 milliLiter(s) (50 mL/Hr) IV Continuous <Continuous>  dextrose 50% Injectable 12.5 Gram(s) IV Push once  dextrose 50% Injectable 25 Gram(s) IV Push once  dextrose 50% Injectable 25 Gram(s) IV Push once  fentaNYL   Patch  12 MICROgram(s)/Hr 1 Patch Transdermal every 72 hours  ferrous    sulfate Liquid 300 milliGRAM(s) Enteral Tube daily  gabapentin   Solution 300 milliGRAM(s) Oral two times a day  hydrocortisone 30 milliGRAM(s) Oral <User Schedule>  insulin lispro (HumaLOG) corrective regimen sliding scale   SubCutaneous every 6 hours  lactobacillus acidophilus 1 Tablet(s) Oral daily  lisinopril 5 milliGRAM(s) Oral daily  medical marijuana oil 1 milliLiter(s),medical marijuana oil 1 milliLiter(s),medical marijuana oil 1 milliLiter(s) 1 milliLiter(s) SubLingual <User Schedule>  minocycline 100 milliGRAM(s) Oral two times a day  mirtazapine 7.5 milliGRAM(s) Oral at bedtime  pantoprazole   Suspension 40 milliGRAM(s) Oral before breakfast  QUEtiapine 25 milliGRAM(s) Oral at bedtime  sertraline 50 milliGRAM(s) Oral daily  sodium chloride 0.9%. 250 milliLiter(s) (25 mL/Hr) IV Continuous <Continuous>  tiotropium 18 MICROgram(s) Capsule 1 Capsule(s) Inhalation daily  tiZANidine 4 milliGRAM(s) Oral <User Schedule>      MEDICATIONS  (PRN):  acetaminophen    Suspension .. 680 milliGRAM(s) Oral every 6 hours PRN Temp greater or equal to 38C (100.4F), Mild Pain (1 - 3)  dextrose 40% Gel 15 Gram(s) Oral once PRN Blood Glucose LESS THAN 70 milliGRAM(s)/deciliter  glucagon  Injectable 1 milliGRAM(s) IntraMuscular once PRN Glucose LESS THAN 70 milligrams/deciliter  nystatin Powder 1 Application(s) Topical two times a day PRN rash/itchiness       ---___---___---___---___---___---     <<<REVIEW OF SYSTEM: >>>    GEN: no fever, no chills, no pain  RESP: no SOB, no cough, no sputum  CVS: no chest pain, no palpitations, no edema  GI: no abdominal pain, no nausea, no vomiting, no constipation, no diarrhea  : no dysurea, no frequency  NEURO: no headache, no dizziness  PSYCH: no depression, not anxious  Derm : no itching, no rash     ---___---___---___---___---___---          <<<  VITAL SIGNS: >>>    T(F): 99.5 (12-27-18 @ 06:06), Max: 100.9 (12-27-18 @ 02:02)  HR: 82 (12-27-18 @ 06:06) (81 - 97)  BP: 117/65 (12-27-18 @ 06:06) (99/58 - 134/83)  RR: 18 (12-27-18 @ 06:06) (17 - 20)  SpO2: 96% (12-27-18 @ 06:06) (96% - 96%)  Wt(kg): --  CAPILLARY BLOOD GLUCOSE      POCT Blood Glucose.: 123 mg/dL (27 Dec 2018 05:59)    I&O's Summary    26 Dec 2018 07:01  -  27 Dec 2018 07:00  --------------------------------------------------------  IN: 790 mL / OUT: 950 mL / NET: -160 mL         ---___---___---___---___---___---                       PHYSICAL EXAM:    GEN: A&O X 1, bed bound   HEENT: NCAT, PERRL, MMM, no scleral icterus, hearing intact  NECK: Supple, No JVD  CVS: S1S2 , regular , No M/R/G appreciated  PULM: CTA B/L,  no W/R/R appreciated  ABD.: soft. non tender, non distended,  bowel sounds present peg present   Extrem: intact pulses , no edema noted        ---___---___---___---___---___---     <<<  LAB AND IMAGING: >>>                          10.4   20.7  )-----------( 366      ( 26 Dec 2018 12:12 )             31.4               12-27    141  |  103  |  74<H>  ----------------------------<  117<H>  3.7   |  22  |  0.65    Ca    10.0      27 Dec 2018 06:23                                 [All pertinent / recent available Imaging reports and other labs reviewed]     ---___---___---___---___---___---___ ---___---___---___---___---           <<<  A S S E S S M E N T   A N D   P L A N :  >>>          -GI/DVT Prophylaxis.    --------------------------------------------  Case discussed with   Education given on     >>______________________<<      Deniz Bolden .         phone   [phone]

## 2018-12-27 NOTE — PROGRESS NOTE ADULT - ASSESSMENT
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  The differential of fever includes infection,malignancy as well as inflammatory fever.  Known DVT in leg.  No clear source of infection  Recent CT of C/A/P as are cultures and CDT  Will consider antifungal therapy but with no  obstruction it is uncommon to see sytemic candidal infections from the urine  now on different steroid supplement,? impacting on wbc and fever  Plan:   continue supportive care as per primary medical team   with repeat blood cultures and C.Diff toxin negative I would not make any changes at this point  My bias would be to support her medically and try to keep comfortable  No indications for additional antibiotics  Will continue to look for infection but she seems to be tolerating 1 month of fever well.  Leukocytosis remains unexplained for now.  ID issues reviewed with her daughter at bedside    [place] guarded

## 2018-12-27 NOTE — PROGRESS NOTE ADULT - ASSESSMENT
leukocytosis   fever   dvt   chf   anxiety   depression   dementia        continue klonopin remeron and zoloft as well as seroquel for her anxiety depression and dementia  using medical marijuana for chronic pain

## 2018-12-27 NOTE — PROGRESS NOTE ADULT - SUBJECTIVE AND OBJECTIVE BOX
CC: f/u for fever    Patient reports: she is poorly interactive    REVIEW OF SYSTEMS:  All other review of systems negative (Comprehensive ROS)    Antimicrobials Day #  :long term  minocycline 100 milliGRAM(s) Oral two times a day    Other Medications Reviewed    T(F): 97.4 (12-27-18 @ 15:39), Max: 100.9 (12-27-18 @ 02:02)  HR: 91 (12-27-18 @ 15:39)  BP: 107/65 (12-27-18 @ 15:39)  RR: 18 (12-27-18 @ 15:39)  SpO2: 95% (12-27-18 @ 15:39)  Wt(kg): --    PHYSICAL EXAM:  General: alert, no acute distress  Eyes:  anicteric, no conjunctival injection, no discharge  Oropharynx: no lesions or injection 	  Neck: supple, without adenopathy  Lungs: clear to auscultation  Heart: regular rate and rhythm; no murmur, rubs or gallops  Abdomen: soft, nondistended, nontender, without mass or organomegaly  Skin: no lesions  Extremities: no clubbing, cyanosis, or edema, contracted  Neurologic: poorly interactive    LAB RESULTS:                        9.8    13.02 )-----------( 365      ( 27 Dec 2018 08:03 )             30.4     12-27    141  |  103  |  74<H>  ----------------------------<  117<H>  3.7   |  22  |  0.65    Ca    10.0      27 Dec 2018 06:23          MICROBIOLOGY:  RECENT CULTURES:  12-25 @ 08:21 .Stool Feces     No enteric pathogens isolated.  (Stool culture examined for Salmonella,  Shigella, Campylobacter, Aeromonas, Plesiomonas,  Vibrio, E.coli O157 and Yersinia)      12-23 @ 17:17 .Blood Blood-Peripheral     No growth to date.          RADIOLOGY REVIEWED:

## 2018-12-27 NOTE — PROGRESS NOTE ADULT - ASSESSMENT
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    # Fever of Unknown Origin: Pt with multiple risk factors to be febrile - chronic gavin, sacral ulcer, advanced dementia increasing risk for aspiration PNA vs atelectasis, and extensive acute DVT. At this time, does not meet criteria for FUO because during first two weeks of admission was being treated for acute DVT and UTI. Pt would need to be febrile for >3 weeks without an identifiable source. However, given pt has daughter with APLS, serologies were sent.  -Pt with (+) PIOTR and (+) anti-centromere Ab  -APLS serologies (anti-cardiolipin, beta-2 glycoprotein, DRVVT, and silica clotting time) wnl    Chronic Steroid Use: Pt with decreased bone health as evidenced by non-traumatic R hip fracture. Given increased morbidity associated with hip fractures, pt may benefit from bisphosphonate therapy. Will likely need an outpatient osteoporosis workup.  -Adrenal crisis could potentiate fever but no other signs of adrenal crisis or adrenal insufficieny at this time. Given persistent fevers, pt was transitioned from prednisone 7.5mg to hydrocortisone 30mg daily.  -Vitals stable. 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    # Fever of Unknown Origin: Pt with multiple risk factors to be febrile - chronic gavin, sacral ulcer, advanced dementia increasing risk for aspiration PNA vs atelectasis, and extensive acute DVT. At this time, does not meet criteria for FUO because during first two weeks of admission was being treated for acute DVT and UTI. Pt would need to be febrile for >3 weeks without an identifiable source. However, given pt has daughter with APLS, serologies were sent.  -Pt with (+) PIOTR and (+) anti-centromere Ab, which is concerning for CREST. However, syndrome not likely to cause fever. Pt currently without physical exam findings - skin thickening, telangiectasias, calcinosis.  -Prior CT Chest (11/2018) significant for ground glass opacities. ECHO (12/2018) with severe pulmonary HTN. Given these findings, in combination with serologies, would recommend pulmonary evaluation for cardiopulmonary manifestations of mixed connective tissue disease.  -APLS serologies (anti-cardiolipin, beta-2 glycoprotein, DRVVT, and silica clotting time) wnl.    Chronic Steroid Use: Pt with decreased bone health as evidenced by non-traumatic R hip fracture. Given increased morbidity associated with hip fractures, pt may benefit from bisphosphonate therapy. Will likely need an outpatient osteoporosis workup.  -Adrenal crisis could potentiate fever but no other signs of adrenal crisis or adrenal insufficieny at this time. Given persistent fevers, pt was transitioned from prednisone 7.5mg to hydrocortisone 30mg daily.  -Vitals stable. 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    # Fever of Unknown Origin: Pt with multiple risk factors to be febrile - chronic gavin, sacral ulcer, advanced dementia increasing risk for aspiration PNA vs atelectasis, and extensive acute DVT. At this time, does not meet criteria for FUO because during first two weeks of admission was being treated for acute DVT and UTI. Pt would need to be febrile for >3 weeks without an identifiable source. However, given pt has daughter with APLS, serologies were sent.  -Pt with (+) PIOTR and (+) anti-centromere Ab, which is concerning for CREST. However, syndrome not likely to cause fever. Pt currently without physical exam findings - skin thickening, telangiectasias, calcinosis.  -Prior CT Chest (11/2018) significant for ground glass opacities. ECHO (12/2018) with severe pulmonary HTN. Given these findings, in combination with serologies, would recommend pulmonary evaluation for cardiopulmonary manifestations of mixed connective tissue disease.  -APLS serologies (anti-cardiolipin, beta-2 glycoprotein, DRVVT, and silica clotting time) wnl.    Chronic Steroid Use: Pt with decreased bone health as evidenced by non-traumatic R hip fracture. Given increased morbidity associated with hip fractures, pt may benefit from bisphosphonate therapy. Will likely need an outpatient osteoporosis workup.  -Adrenal crisis could potentiate fever but no other signs of adrenal crisis or adrenal insufficieny at this time. Given persistent fevers, pt was transitioned from prednisone 7.5mg to hydrocortisone 30mg daily.  -Vitals stable.  Case discussed with formal caregiver today but will return tommorrow speak to family.

## 2018-12-28 LAB
ANION GAP SERPL CALC-SCNC: 16 MMOL/L — SIGNIFICANT CHANGE UP (ref 5–17)
BUN SERPL-MCNC: 68 MG/DL — HIGH (ref 7–23)
CALCIUM SERPL-MCNC: 9.8 MG/DL — SIGNIFICANT CHANGE UP (ref 8.4–10.5)
CHLORIDE SERPL-SCNC: 104 MMOL/L — SIGNIFICANT CHANGE UP (ref 96–108)
CO2 SERPL-SCNC: 21 MMOL/L — LOW (ref 22–31)
CREAT SERPL-MCNC: 0.59 MG/DL — SIGNIFICANT CHANGE UP (ref 0.5–1.3)
CULTURE RESULTS: SIGNIFICANT CHANGE UP
CULTURE RESULTS: SIGNIFICANT CHANGE UP
GLUCOSE BLDC GLUCOMTR-MCNC: 106 MG/DL — HIGH (ref 70–99)
GLUCOSE BLDC GLUCOMTR-MCNC: 112 MG/DL — HIGH (ref 70–99)
GLUCOSE BLDC GLUCOMTR-MCNC: 142 MG/DL — HIGH (ref 70–99)
GLUCOSE BLDC GLUCOMTR-MCNC: 207 MG/DL — HIGH (ref 70–99)
GLUCOSE SERPL-MCNC: 108 MG/DL — HIGH (ref 70–99)
HCT VFR BLD CALC: 30.8 % — LOW (ref 34.5–45)
HGB BLD-MCNC: 9.7 G/DL — LOW (ref 11.5–15.5)
MCHC RBC-ENTMCNC: 29.8 PG — SIGNIFICANT CHANGE UP (ref 27–34)
MCHC RBC-ENTMCNC: 31.5 GM/DL — LOW (ref 32–36)
MCV RBC AUTO: 94.5 FL — SIGNIFICANT CHANGE UP (ref 80–100)
PLATELET # BLD AUTO: 354 K/UL — SIGNIFICANT CHANGE UP (ref 150–400)
POTASSIUM SERPL-MCNC: 4.5 MMOL/L — SIGNIFICANT CHANGE UP (ref 3.5–5.3)
POTASSIUM SERPL-SCNC: 4.5 MMOL/L — SIGNIFICANT CHANGE UP (ref 3.5–5.3)
RBC # BLD: 3.26 M/UL — LOW (ref 3.8–5.2)
RBC # FLD: 15.7 % — HIGH (ref 10.3–14.5)
SODIUM SERPL-SCNC: 141 MMOL/L — SIGNIFICANT CHANGE UP (ref 135–145)
SPECIMEN SOURCE: SIGNIFICANT CHANGE UP
SPECIMEN SOURCE: SIGNIFICANT CHANGE UP
WBC # BLD: 12.61 K/UL — HIGH (ref 3.8–10.5)
WBC # FLD AUTO: 12.61 K/UL — HIGH (ref 3.8–10.5)

## 2018-12-28 PROCEDURE — 99232 SBSQ HOSP IP/OBS MODERATE 35: CPT | Mod: GC

## 2018-12-28 RX ORDER — ACETAMINOPHEN 500 MG
650 TABLET ORAL ONCE
Qty: 0 | Refills: 0 | Status: COMPLETED | OUTPATIENT
Start: 2018-12-28 | End: 2018-12-28

## 2018-12-28 RX ORDER — FENTANYL CITRATE 50 UG/ML
1 INJECTION INTRAVENOUS
Qty: 0 | Refills: 0 | Status: DISCONTINUED | OUTPATIENT
Start: 2018-12-28 | End: 2019-01-02

## 2018-12-28 RX ADMIN — Medication 1 APPLICATION(S): at 22:33

## 2018-12-28 RX ADMIN — APIXABAN 5 MILLIGRAM(S): 2.5 TABLET, FILM COATED ORAL at 18:36

## 2018-12-28 RX ADMIN — MINOCYCLINE HYDROCHLORIDE 100 MILLIGRAM(S): 45 TABLET, EXTENDED RELEASE ORAL at 18:36

## 2018-12-28 RX ADMIN — LISINOPRIL 5 MILLIGRAM(S): 2.5 TABLET ORAL at 06:09

## 2018-12-28 RX ADMIN — Medication 3 MILLILITER(S): at 22:31

## 2018-12-28 RX ADMIN — BUDESONIDE AND FORMOTEROL FUMARATE DIHYDRATE 2 PUFF(S): 160; 4.5 AEROSOL RESPIRATORY (INHALATION) at 09:59

## 2018-12-28 RX ADMIN — Medication 1 TABLET(S): at 12:49

## 2018-12-28 RX ADMIN — BUDESONIDE AND FORMOTEROL FUMARATE DIHYDRATE 2 PUFF(S): 160; 4.5 AEROSOL RESPIRATORY (INHALATION) at 21:16

## 2018-12-28 RX ADMIN — Medication 680 MILLIGRAM(S): at 12:48

## 2018-12-28 RX ADMIN — Medication 1 APPLICATION(S): at 06:35

## 2018-12-28 RX ADMIN — QUETIAPINE FUMARATE 25 MILLIGRAM(S): 200 TABLET, FILM COATED ORAL at 22:31

## 2018-12-28 RX ADMIN — Medication 300 MILLIGRAM(S): at 12:49

## 2018-12-28 RX ADMIN — FENTANYL CITRATE 1 PATCH: 50 INJECTION INTRAVENOUS at 07:45

## 2018-12-28 RX ADMIN — GABAPENTIN 50 MILLIGRAM(S): 400 CAPSULE ORAL at 06:08

## 2018-12-28 RX ADMIN — Medication 680 MILLIGRAM(S): at 18:35

## 2018-12-28 RX ADMIN — Medication 260 MILLIGRAM(S): at 22:41

## 2018-12-28 RX ADMIN — TIZANIDINE 4 MILLIGRAM(S): 4 TABLET ORAL at 21:16

## 2018-12-28 RX ADMIN — Medication 2: at 12:48

## 2018-12-28 RX ADMIN — Medication 260 MILLIGRAM(S): at 03:25

## 2018-12-28 RX ADMIN — Medication 0.5 MILLIGRAM(S): at 22:41

## 2018-12-28 RX ADMIN — Medication 650 MILLIGRAM(S): at 04:15

## 2018-12-28 RX ADMIN — MIRTAZAPINE 7.5 MILLIGRAM(S): 45 TABLET, ORALLY DISINTEGRATING ORAL at 22:30

## 2018-12-28 RX ADMIN — MINOCYCLINE HYDROCHLORIDE 100 MILLIGRAM(S): 45 TABLET, EXTENDED RELEASE ORAL at 06:09

## 2018-12-28 RX ADMIN — Medication 30 MILLIGRAM(S): at 10:01

## 2018-12-28 RX ADMIN — GABAPENTIN 300 MILLIGRAM(S): 400 CAPSULE ORAL at 18:40

## 2018-12-28 RX ADMIN — APIXABAN 5 MILLIGRAM(S): 2.5 TABLET, FILM COATED ORAL at 06:09

## 2018-12-28 RX ADMIN — Medication 3 MILLILITER(S): at 09:59

## 2018-12-28 RX ADMIN — Medication 680 MILLIGRAM(S): at 13:16

## 2018-12-28 RX ADMIN — SERTRALINE 50 MILLIGRAM(S): 25 TABLET, FILM COATED ORAL at 12:49

## 2018-12-28 RX ADMIN — Medication 3 MILLILITER(S): at 18:36

## 2018-12-28 RX ADMIN — TIZANIDINE 4 MILLIGRAM(S): 4 TABLET ORAL at 10:00

## 2018-12-28 RX ADMIN — Medication 1 APPLICATION(S): at 12:49

## 2018-12-28 RX ADMIN — PANTOPRAZOLE SODIUM 40 MILLIGRAM(S): 20 TABLET, DELAYED RELEASE ORAL at 06:09

## 2018-12-28 RX ADMIN — Medication 1 APPLICATION(S): at 18:36

## 2018-12-28 RX ADMIN — Medication 650 MILLIGRAM(S): at 23:00

## 2018-12-28 NOTE — PROGRESS NOTE ADULT - SUBJECTIVE AND OBJECTIVE BOX
MYRIAM HEATH  [id number]    INTERVAL HPI/OVERNIGHT EVENTS:  Fevers ongoing, Tmax 101.1 this AM.  Unable to obtain ROS 2/2 mental status.    Pt's  at bedside.      MEDICATIONS  (STANDING):  ALBUTerol/ipratropium for Nebulization 3 milliLiter(s) Nebulizer every 6 hours  apixaban 5 milliGRAM(s) Oral every 12 hours  AQUAPHOR (petrolatum Ointment) 1 Application(s) Topical three times a day  buDESOnide 160 MICROgram(s)/formoterol 4.5 MICROgram(s) Inhaler 2 Puff(s) Inhalation two times a day  clobetasol 0.05% Ointment 1 Application(s) Topical two times a day  clonazePAM Tablet 0.5 milliGRAM(s) Oral at bedtime  dextrose 5%. 1000 milliLiter(s) (50 mL/Hr) IV Continuous <Continuous>  dextrose 50% Injectable 12.5 Gram(s) IV Push once  dextrose 50% Injectable 25 Gram(s) IV Push once  dextrose 50% Injectable 25 Gram(s) IV Push once  ferrous    sulfate Liquid 300 milliGRAM(s) Enteral Tube daily  gabapentin   Solution 300 milliGRAM(s) Oral two times a day  hydrocortisone 30 milliGRAM(s) Oral <User Schedule>  insulin lispro (HumaLOG) corrective regimen sliding scale   SubCutaneous every 6 hours  lactobacillus acidophilus 1 Tablet(s) Oral daily  lisinopril 5 milliGRAM(s) Oral daily  medical marijuana oil 1 milliLiter(s),medical marijuana oil 1 milliLiter(s),medical marijuana oil 1 milliLiter(s) 1 milliLiter(s) SubLingual <User Schedule>  minocycline 100 milliGRAM(s) Oral two times a day  mirtazapine 7.5 milliGRAM(s) Oral at bedtime  pantoprazole   Suspension 40 milliGRAM(s) Oral before breakfast  QUEtiapine 25 milliGRAM(s) Oral at bedtime  sertraline 50 milliGRAM(s) Oral daily  sodium chloride 0.9%. 250 milliLiter(s) (25 mL/Hr) IV Continuous <Continuous>  tiotropium 18 MICROgram(s) Capsule 1 Capsule(s) Inhalation daily  tiZANidine 4 milliGRAM(s) Oral <User Schedule>    MEDICATIONS  (PRN):  acetaminophen    Suspension .. 680 milliGRAM(s) Oral every 6 hours PRN Temp greater or equal to 38C (100.4F), Mild Pain (1 - 3)  dextrose 40% Gel 15 Gram(s) Oral once PRN Blood Glucose LESS THAN 70 milliGRAM(s)/deciliter  glucagon  Injectable 1 milliGRAM(s) IntraMuscular once PRN Glucose LESS THAN 70 milligrams/deciliter  nystatin Powder 1 Application(s) Topical two times a day PRN rash/itchiness      Allergies    ASA; dye contrast (Anaphylaxis)  aspirin (Short breath)  divalproex sodium (Other (Unknown))  Haldol (Other (Unknown))  penicillin (Short breath; Rash)  sulfa drugs (Short breath; Rash)  vancomycin (Rash; Urticaria; Hives)  Xanax (Other (Unknown))    Intolerances      Vital Signs Last 24 Hrs  T(C): 36.5 (28 Dec 2018 05:52), Max: 38.4 (28 Dec 2018 03:15)  T(F): 97.7 (28 Dec 2018 05:52), Max: 101.1 (28 Dec 2018 03:15)  HR: 87 (28 Dec 2018 05:52) (84 - 92)  BP: 121/75 (28 Dec 2018 05:52) (103/65 - 134/71)  BP(mean): --  RR: 18 (28 Dec 2018 05:52) (18 - 18)  SpO2: 93% (28 Dec 2018 05:52) (93% - 96%)    Physical Exam:  General: No apparent distress, contracted  CVS: RRR  Resp: CTA anteriorly; no wheezes or crackles  GI: Soft, NT/ND +BS; PEG tube in place - no drainage  MSK: no joint swelling or tenderness, ROM limited to contractures  Neuro: non-verbal  Skin: no visible rashes, no skin thickening     LABS:                        9.7    12.61 )-----------( 354      ( 28 Dec 2018 07:02 )             30.8     12-28    141  |  104  |  68<H>  ----------------------------<  108<H>  4.5   |  21<L>  |  0.59    Ca    9.8      28 Dec 2018 05:27              RADIOLOGY & ADDITIONAL TESTS:

## 2018-12-28 NOTE — PROGRESS NOTE ADULT - ASSESSMENT
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  The differential of fever includes infection,malignancy as well as inflammatory fever.  Known DVT in leg.  No clear source of infection  Recent CT of C/A/P as are cultures and CDT(last CT scan 12/22)  Will consider antifungal therapy but with no  obstruction it is uncommon to see sytemic candidal infections from the urine  now on different steroid supplement,? impacting on wbc and fever  Her overall course is largely unchanged  Plan:   continue supportive care as per primary medical team   with repeat blood cultures and C.Diff toxin negative I would not make any changes at this point  My bias would be to support her medically and try to keep comfortable  No indications for additional antibiotics  Will continue to look for infection but she seems to be tolerating 1 month of fever well.  Leukocytosis remains unexplained for now.It has moderated  ID issues reviewed with her   at bedside.  I would favor a hospice disposition but I do not think family is ready for this    [place] guarded

## 2018-12-28 NOTE — PROGRESS NOTE ADULT - SUBJECTIVE AND OBJECTIVE BOX
---___---___---___---___---___---___ ---___---___---___---___---___---___---___---___---___---                    <<<  M E D I C A L   A T T E N D I N G    F O L L O W    U P   N O T E  >>>    - Patient seen and examined by me approximately thirty minutes ago.  - In summary, patient is a 68y year old Female who origianlly presented with  chf newonset dvt and nw persistent fever        ---___---___---___---___---___---      <<<  MEDICATIONS:  >>>    MEDICATIONS  (STANDING):  ALBUTerol/ipratropium for Nebulization 3 milliLiter(s) Nebulizer every 6 hours  apixaban 5 milliGRAM(s) Oral every 12 hours  AQUAPHOR (petrolatum Ointment) 1 Application(s) Topical three times a day  buDESOnide 160 MICROgram(s)/formoterol 4.5 MICROgram(s) Inhaler 2 Puff(s) Inhalation two times a day  clobetasol 0.05% Ointment 1 Application(s) Topical two times a day  clonazePAM Tablet 0.5 milliGRAM(s) Oral at bedtime  dextrose 5%. 1000 milliLiter(s) (50 mL/Hr) IV Continuous <Continuous>  dextrose 50% Injectable 12.5 Gram(s) IV Push once  dextrose 50% Injectable 25 Gram(s) IV Push once  dextrose 50% Injectable 25 Gram(s) IV Push once  fentaNYL   Patch  12 MICROgram(s)/Hr 1 Patch Transdermal every 72 hours  ferrous    sulfate Liquid 300 milliGRAM(s) Enteral Tube daily  gabapentin   Solution 300 milliGRAM(s) Oral two times a day  hydrocortisone 30 milliGRAM(s) Oral <User Schedule>  insulin lispro (HumaLOG) corrective regimen sliding scale   SubCutaneous every 6 hours  lactobacillus acidophilus 1 Tablet(s) Oral daily  lisinopril 5 milliGRAM(s) Oral daily  medical marijuana oil 1 milliLiter(s),medical marijuana oil 1 milliLiter(s),medical marijuana oil 1 milliLiter(s) 1 milliLiter(s) SubLingual <User Schedule>  minocycline 100 milliGRAM(s) Oral two times a day  mirtazapine 7.5 milliGRAM(s) Oral at bedtime  pantoprazole   Suspension 40 milliGRAM(s) Oral before breakfast  QUEtiapine 25 milliGRAM(s) Oral at bedtime  sertraline 50 milliGRAM(s) Oral daily  sodium chloride 0.9%. 250 milliLiter(s) (25 mL/Hr) IV Continuous <Continuous>  tiotropium 18 MICROgram(s) Capsule 1 Capsule(s) Inhalation daily  tiZANidine 4 milliGRAM(s) Oral <User Schedule>      MEDICATIONS  (PRN):  acetaminophen    Suspension .. 680 milliGRAM(s) Oral every 6 hours PRN Temp greater or equal to 38C (100.4F), Mild Pain (1 - 3)  dextrose 40% Gel 15 Gram(s) Oral once PRN Blood Glucose LESS THAN 70 milliGRAM(s)/deciliter  glucagon  Injectable 1 milliGRAM(s) IntraMuscular once PRN Glucose LESS THAN 70 milligrams/deciliter  nystatin Powder 1 Application(s) Topical two times a day PRN rash/itchiness       ---___---___---___---___---___---     <<<REVIEW OF SYSTEM: >>>          ---___---___---___---___---___---          <<<  VITAL SIGNS: >>>    T(F): 97.8 (12-28-18 @ 13:30), Max: 101.1 (12-28-18 @ 03:15)  HR: 89 (12-28-18 @ 13:30) (87 - 92)  BP: 107/66 (12-28-18 @ 13:30) (103/65 - 134/71)  RR: 18 (12-28-18 @ 13:30) (18 - 18)  SpO2: 97% (12-28-18 @ 13:30) (93% - 97%)  Wt(kg): --  CAPILLARY BLOOD GLUCOSE      POCT Blood Glucose.: 207 mg/dL (28 Dec 2018 12:07)    I&O's Summary    27 Dec 2018 07:01  -  28 Dec 2018 07:00  --------------------------------------------------------  IN: 660 mL / OUT: 900 mL / NET: -240 mL         ---___---___---___---___---___---                       PHYSICAL EXAM:    GEN: A&O X 0 , NAD , comfortable  HEENT: NCAT, PERRL, MMM, no scleral icterus, hearing intact  NECK: Supple, No JVD  CVS: S1S2 , regular , No M/R/G appreciated  PULM: CTA B/L,  no W/R/R appreciated  ABD.: soft. non tender, non distended,  bowel sounds present peg noted  Extrem: intact pulses , no edema noted  Derm: No rash or ecchymosis noted sacral decubitus ulcer   PSYCH: normal mood, no depression, not anxious     ---___---___---___---___---___---     <<<  LAB AND IMAGING: >>>                          9.7    12.61 )-----------( 354      ( 28 Dec 2018 07:02 )             30.8               12-28    141  |  104  |  68<H>  ----------------------------<  108<H>  4.5   |  21<L>  |  0.59    Ca    9.8      28 Dec 2018 05:27                                 [All pertinent / recent available Imaging reports and other labs reviewed]     ---___---___---___---___---___---___ ---___---___---___---___---           <<<  A S S E S S M E N T   A N D   P L A N :  >>>          -GI/DVT Prophylaxis.    --------------------------------------------  Case discussed with  family   Education given on     >>______________________<<      Deniz Bolden .         phone   [phone]

## 2018-12-28 NOTE — PROVIDER CONTACT NOTE (OTHER) - SITUATION
Pt has a rectal temperature of 101.1. PA aware. Pt with 100.1 rectal temp earlier this shift.  refused repeat rectal temp, see note.

## 2018-12-28 NOTE — CHART NOTE - NSCHARTNOTEFT_GEN_A_CORE
Upon Nutritional Assessment by the Registered Dietitian your patient was determined to meet criteria / has evidence of the following diagnosis/diagnoses:          [ ]  Mild Protein Calorie Malnutrition        [ ]  Moderate Protein Calorie Malnutrition        [x] Severe Protein Calorie Malnutrition        [ ] Unspecified Protein Calorie Malnutrition        [ ] Underweight / BMI <19        [ ] Morbid Obesity / BMI > 40      Findings as based on:  [ ] Comprehensive nutrition assessment   [x ] Nutrition Focused Physical Exam  [ x] Other: severe muscle and fat depletion, >5% wt loss x 1 month, multiple pressure injuries       Nutrition Plan/Recommendations:    Refer to nutrition chart note for nutrition recommendations.   RD to remain available and follow-up as medically appropriate.     PROVIDER Section:     By signing this assessment you are acknowledging and agree with the diagnosis/diagnoses assigned by the Registered Dietitian    Comments:

## 2018-12-28 NOTE — PROGRESS NOTE ADULT - SUBJECTIVE AND OBJECTIVE BOX
CC: f/u for fever    Patient reports: she is non verbal, poorly interactive, receiving peg feeds, on new steroid formulation    REVIEW OF SYSTEMS:  All other review of systems negative (Comprehensive ROS)    Antimicrobials Day #  :  minocycline 100 milliGRAM(s) Oral two times a day    Other Medications Reviewed    T(F): 97.7 (12-28-18 @ 05:52), Max: 101.1 (12-28-18 @ 03:15)  HR: 87 (12-28-18 @ 05:52)  BP: 121/75 (12-28-18 @ 05:52)  RR: 18 (12-28-18 @ 05:52)  SpO2: 93% (12-28-18 @ 05:52)  Wt(kg): --    PHYSICAL EXAM:  General: awake, no acute distress  Eyes:  anicteric, no conjunctival injection, no discharge  Oropharynx: no lesions or injection 	  Neck: supple, without adenopathy  Lungs: clear to auscultation, diminished at bases  Heart: regular rate and rhythm; no murmur, rubs or gallops  Abdomen: soft, nondistended, nontender, without mass or organomegaly  Skin: no lesions  Extremities: no clubbing, cyanosis, or edema, somewhat contracted  Neurologic: alert, poorly interactive    LAB RESULTS:                        9.7    12.61 )-----------( 354      ( 28 Dec 2018 07:02 )             30.8     12-28    141  |  104  |  68<H>  ----------------------------<  108<H>  4.5   |  21<L>  |  0.59    Ca    9.8      28 Dec 2018 05:27          MICROBIOLOGY:  RECENT CULTURES:  12-27 @ 08:39 .Blood Blood     No growth to date.      12-25 @ 08:21 .Stool Feces     No enteric pathogens isolated.  (Stool culture examined for Salmonella,  Shigella, Campylobacter, Aeromonas, Plesiomonas,  Vibrio, E.coli O157 and Yersinia)      12-23 @ 17:17 .Blood Blood-Peripheral     No growth to date.          RADIOLOGY REVIEWED:

## 2018-12-28 NOTE — PROVIDER CONTACT NOTE (OTHER) - SITUATION
Pt temperature is 100.1. PA recommends to recheck rectal temperature in an hour. Pts  refuses. Pt has hx of multiple fevers this admission.

## 2018-12-28 NOTE — PROGRESS NOTE ADULT - ASSESSMENT
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    # Fever of Unknown Origin: Pt with multiple risk factors to be febrile - chronic gavin, sacral ulcer, advanced dementia increasing risk for aspiration PNA vs atelectasis, and extensive acute DVT. At this time, does not meet criteria for FUO because during first two weeks of admission was being treated for acute DVT and UTI. Pt would need to be febrile for >3 weeks without an identifiable source. However, given pt has daughter with APLS, serologies were sent.  -Pt with (+) PIOTR and (+) anti-centromere Ab, which is concerning for CREST. However, syndrome not likely to cause fever. Pt currently without physical exam findings - skin thickening, telangiectasias, calcinosis.  -Prior CT Chest (11/2018) significant for ground glass opacities. ECHO (12/2018) with severe pulmonary HTN. Given these findings, in combination with serologies, would recommend pulmonology evaluation for cardiopulmonary manifestations of mixed connective tissue disease.  -APLS serologies (anti-cardiolipin, beta-2 glycoprotein, DRVVT, and silica clotting time) wnl.  -Plan to discuss case with family today.    # Chronic Steroid Use: Pt with decreased bone health as evidenced by non-traumatic R hip fracture. Given increased morbidity associated with hip fractures, pt may benefit from bisphosphonate therapy. Will likely need an outpatient osteoporosis workup.  -Adrenal crisis could potentiate fever but no other signs of adrenal crisis or adrenal insufficieny at this time. Given persistent fevers, pt was transitioned from prednisone 7.5mg to hydrocortisone 30mg daily.  -Vitals stable. 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    # Fever of Unknown Origin: Pt with multiple risk factors to be febrile - chronic gavin, sacral ulcer, advanced dementia increasing risk for aspiration PNA vs atelectasis, and extensive acute DVT. At this time, does not meet criteria for FUO because during first two weeks of admission was being treated for acute DVT and UTI. Pt would need to be febrile for >3 weeks without an identifiable source. However, given pt has daughter with APLS, serologies were sent.  -Pt with (+) PIOTR and (+) anti-centromere Ab, which is concerning for CREST. However, syndrome not likely to cause fever. Pt currently without physical exam findings - skin thickening, telangiectasias, calcinosis.  -Prior CT Chest (11/2018) significant for ground glass opacities. ECHO (12/2018) with severe pulmonary HTN. Given these findings, in combination with serologies, would recommend pulmonology evaluation for cardiopulmonary manifestations of mixed connective tissue disease.  -APLS serologies (anti-cardiolipin, beta-2 glycoprotein, DRVVT, and silica clotting time) wnl.  -Plan to discuss case with family today.    # Chronic Steroid Use: Pt with decreased bone health as evidenced by non-traumatic R hip fracture. Given increased morbidity associated with hip fractures, pt may benefit from bisphosphonate therapy. Will likely need an outpatient osteoporosis workup.  -Adrenal crisis could potentiate fever but no other signs of adrenal crisis or adrenal insufficieny at this time. Given persistent fevers, pt was transitioned from prednisone 7.5mg to hydrocortisone 30mg daily. No significant change in vitals. Would recommend placing pt back on prednisone 7.5mg daily. 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    # Fever of Unknown Origin: Pt with multiple risk factors to be febrile - chronic gavin, sacral ulcer, advanced dementia increasing risk for aspiration PNA vs atelectasis, and extensive acute DVT. At this time, does not meet criteria for FUO because during first two weeks of admission was being treated for acute DVT and UTI. Pt would need to be febrile for >3 weeks without an identifiable source. However, given pt has daughter with APLS, serologies were sent.  -Pt with (+) PIOTR and (+) anti-centromere Ab, which is concerning for CREST. However, syndrome not likely to cause fever. Pt currently without physical exam findings - skin thickening, telangiectasias, calcinosis.  -Prior CT Chest (11/2018) significant for ground glass opacities. ECHO (12/2018) with severe pulmonary HTN. Given these findings, in combination with serologies, would recommend pulmonology evaluation for cardiopulmonary manifestations of mixed connective tissue disease.  -APLS serologies (anti-cardiolipin, beta-2 glycoprotein, DRVVT, and silica clotting time) wnl.  -Discussed with patients  today.     # Chronic Steroid Use: Pt with decreased bone health as evidenced by non-traumatic R hip fracture. Given increased morbidity associated with hip fractures, pt may benefit from bisphosphonate therapy. Will likely need an outpatient osteoporosis workup.  -Adrenal crisis could potentiate fever but no other signs of adrenal crisis or adrenal insufficieny at this time. Given persistent fevers, pt was transitioned from prednisone 7.5mg to hydrocortisone 30mg daily. No significant change in vitals. Would recommend placing pt back on prednisone 7.5mg daily.

## 2018-12-28 NOTE — CHART NOTE - NSCHARTNOTEFT_GEN_A_CORE
Nutrition Follow Up Note  Patient seen for: nutrition consult for "assessment of tube feeds for multiple loose stools".     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 Remains with fever of unknown etiology.   Source: NP, RN,  at bedside     Diet : NPO with enteral feeds      Enteral /Parenteral Nutrition: Vital AF @ 45ml/hrs x 24 hrs via PEG. Regimen provides 1080ml total volume, 1296 kcal, and 81 g protein (28 kcal/kg and 1.8 g/kg protein based on dosing wt of 45.4 kg). Pt also receiving Banatrol Plus 3x per day given loose BMs.   Per RN pt tolerating feeds with no nausea/vomiting, abdominal distention. Pt noted with 4 BMs yesterday (12/27), however per  pt with multiple loose/liquids stools when changing patient. Per RN pt received Banatrol twice yesterday. Pt also started on probiotics in setting of antibiotic use however pt remains with loose stools. Pt C- difficile negative on 12/24. Discussed with provider, adding imodium.       Weight history:   11/13: 105 lbs   11/14: 95.2 lbs  Dosing wt 99.88 lbs (12/4)  12/3: 124lbs   12/20: 91lbs   12/21: 92.3 lbs  12/27: 93 lbs   Pt noted with large wt fluctuation this admission, weight change likely partially related to fluid shift however suspect dry weight loss as well. Pt noted with 7% weight loss (99lbs--> 93lbs) since admission.     Nutrition focused physical exam limited due to patient position however pt noted with severe muscle wasting to temporals, clavicles, severe fat loss of orbitals     Pertinent Medications: MEDICATIONS  (STANDING):  ALBUTerol/ipratropium for Nebulization 3 milliLiter(s) Nebulizer every 6 hours  apixaban 5 milliGRAM(s) Oral every 12 hours  AQUAPHOR (petrolatum Ointment) 1 Application(s) Topical three times a day  buDESOnide 160 MICROgram(s)/formoterol 4.5 MICROgram(s) Inhaler 2 Puff(s) Inhalation two times a day  clobetasol 0.05% Ointment 1 Application(s) Topical two times a day  clonazePAM Tablet 0.5 milliGRAM(s) Oral at bedtime  dextrose 5%. 1000 milliLiter(s) (50 mL/Hr) IV Continuous <Continuous>  dextrose 50% Injectable 12.5 Gram(s) IV Push once  dextrose 50% Injectable 25 Gram(s) IV Push once  dextrose 50% Injectable 25 Gram(s) IV Push once  fentaNYL   Patch  12 MICROgram(s)/Hr 1 Patch Transdermal every 72 hours  ferrous    sulfate Liquid 300 milliGRAM(s) Enteral Tube daily  gabapentin   Solution 300 milliGRAM(s) Oral two times a day  hydrocortisone 30 milliGRAM(s) Oral <User Schedule>  insulin lispro (HumaLOG) corrective regimen sliding scale   SubCutaneous every 6 hours  lactobacillus acidophilus 1 Tablet(s) Oral daily  lisinopril 5 milliGRAM(s) Oral daily  medical marijuana oil 1 milliLiter(s),medical marijuana oil 1 milliLiter(s),medical marijuana oil 1 milliLiter(s) 1 milliLiter(s) SubLingual <User Schedule>  minocycline 100 milliGRAM(s) Oral two times a day  mirtazapine 7.5 milliGRAM(s) Oral at bedtime  pantoprazole   Suspension 40 milliGRAM(s) Oral before breakfast  QUEtiapine 25 milliGRAM(s) Oral at bedtime  sertraline 50 milliGRAM(s) Oral daily  sodium chloride 0.9%. 250 milliLiter(s) (25 mL/Hr) IV Continuous <Continuous>  tiotropium 18 MICROgram(s) Capsule 1 Capsule(s) Inhalation daily  tiZANidine 4 milliGRAM(s) Oral <User Schedule>    MEDICATIONS  (PRN):  acetaminophen    Suspension .. 680 milliGRAM(s) Oral every 6 hours PRN Temp greater or equal to 38C (100.4F), Mild Pain (1 - 3)  dextrose 40% Gel 15 Gram(s) Oral once PRN Blood Glucose LESS THAN 70 milliGRAM(s)/deciliter  glucagon  Injectable 1 milliGRAM(s) IntraMuscular once PRN Glucose LESS THAN 70 milligrams/deciliter  nystatin Powder 1 Application(s) Topical two times a day PRN rash/itchiness    Pertinent Labs: 12-28 @ 05:27: Na 141, BUN 68<H>, Cr 0.59, <H>, K+ 4.5, Phos --, Mg --, Alk Phos --, ALT/SGPT --, AST/SGOT --, HbA1c --    Finger Sticks:  POCT Blood Glucose.: 207 mg/dL (12-28 @ 12:07)  POCT Blood Glucose.: 142 mg/dL (12-28 @ 07:11)  POCT Blood Glucose.: 106 mg/dL (12-28 @ 00:08)  POCT Blood Glucose.: 166 mg/dL (12-27 @ 18:25)      Skin per nursing documentation:   Edema: none     Estimated Needs:   [x ] no change since previous assessment  [ ] recalculated:     Previous Nutrition Diagnosis: Increased nutrient needs    Nutrition Diagnosis is: on going     New Nutrition Diagnosis: Malnutrition (severe)   Related to: related to unknown etiology, chronic illness, ? malabsorption   as evidenced by severe muscle and fat depletion, >5% wt loss x 1 month  Interventions:     Recommend  1)    Monitoring and Evaluation:     Continue to monitor Nutritional intake, Tolerance to diet prescription, weights, labs, skin integrity    RD remains available upon request and will follow up per protocol  Shasha Hare RD, CDN, Pager # [phone] Nutrition Follow Up Note    Patient seen for: nutrition consult for "multiple liquid BMs despite Banatrol TID".     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 Remains with fever of unknown etiology.     Source: PA, RN,  at bedside, pt non-verbal     Diet : NPO with enteral feeds      Enteral /Parenteral Nutrition: Vital AF @ 45ml/hrs x 24 hrs via PEG. Regimen provides 1080ml total volume, 1296 kcal, and 81 g protein (28 kcal/kg and 1.8 g/kg protein based on dosing wt of 45.4 kg). Pt also receiving Banatrol Plus 3x per day given loose BMs. Per RN pt tolerating feeds with no nausea/vomiting, abdominal distention. Pt noted with 4 BMs yesterday (12/27), however per  pt with multiple loose/liquids stools when changing patient.  believes pt was with less loose BMs when on bolus regimen. Per RN pt received Banatrol twice yesterday. Pt also started on probiotics in setting of antibiotic use however pt remains with loose stools. Pt C-difficile negative on 12/24.     Weight history:   11/13: 105 lbs   11/14: 95.2 lbs  Dosing wt 99.88 lbs (12/4)  12/3: 124lbs --? accuracy  12/20: 91lbs   12/21: 92.3 lbs  12/27: 93 lbs   Pt noted with large wt fluctuation this admission, weight change likely partially related to fluid shifts however suspect dry weight loss as well. Pt noted with 7% weight loss (99lbs--> 93lbs) since admission.     Nutrition focused physical exam limited due to patient position however pt noted with severe muscle wasting to temporals, clavicles, severe fat loss of orbitals     Pertinent Medications: MEDICATIONS  (STANDING):  ALBUTerol/ipratropium for Nebulization 3 milliLiter(s) Nebulizer every 6 hours  apixaban 5 milliGRAM(s) Oral every 12 hours  AQUAPHOR (petrolatum Ointment) 1 Application(s) Topical three times a day  buDESOnide 160 MICROgram(s)/formoterol 4.5 MICROgram(s) Inhaler 2 Puff(s) Inhalation two times a day  clobetasol 0.05% Ointment 1 Application(s) Topical two times a day  clonazePAM Tablet 0.5 milliGRAM(s) Oral at bedtime  dextrose 5%. 1000 milliLiter(s) (50 mL/Hr) IV Continuous <Continuous>  dextrose 50% Injectable 12.5 Gram(s) IV Push once  dextrose 50% Injectable 25 Gram(s) IV Push once  dextrose 50% Injectable 25 Gram(s) IV Push once  fentaNYL   Patch  12 MICROgram(s)/Hr 1 Patch Transdermal every 72 hours  ferrous    sulfate Liquid 300 milliGRAM(s) Enteral Tube daily  gabapentin   Solution 300 milliGRAM(s) Oral two times a day  hydrocortisone 30 milliGRAM(s) Oral <User Schedule>  insulin lispro (HumaLOG) corrective regimen sliding scale   SubCutaneous every 6 hours  lactobacillus acidophilus 1 Tablet(s) Oral daily  lisinopril 5 milliGRAM(s) Oral daily  medical marijuana oil 1 milliLiter(s),medical marijuana oil 1 milliLiter(s),medical marijuana oil 1 milliLiter(s) 1 milliLiter(s) SubLingual <User Schedule>  minocycline 100 milliGRAM(s) Oral two times a day  mirtazapine 7.5 milliGRAM(s) Oral at bedtime  pantoprazole   Suspension 40 milliGRAM(s) Oral before breakfast  QUEtiapine 25 milliGRAM(s) Oral at bedtime  sertraline 50 milliGRAM(s) Oral daily  sodium chloride 0.9%. 250 milliLiter(s) (25 mL/Hr) IV Continuous <Continuous>  tiotropium 18 MICROgram(s) Capsule 1 Capsule(s) Inhalation daily  tiZANidine 4 milliGRAM(s) Oral <User Schedule>    MEDICATIONS  (PRN):  acetaminophen    Suspension .. 680 milliGRAM(s) Oral every 6 hours PRN Temp greater or equal to 38C (100.4F), Mild Pain (1 - 3)  dextrose 40% Gel 15 Gram(s) Oral once PRN Blood Glucose LESS THAN 70 milliGRAM(s)/deciliter  glucagon  Injectable 1 milliGRAM(s) IntraMuscular once PRN Glucose LESS THAN 70 milligrams/deciliter  nystatin Powder 1 Application(s) Topical two times a day PRN rash/itchiness    Pertinent Labs: 12-28 @ 05:27: Na 141, BUN 68<H>, Cr 0.59, <H>, K+ 4.5, Phos --, Mg --, Alk Phos --, ALT/SGPT --, AST/SGOT --, HbA1c --    Finger Sticks:  POCT Blood Glucose.: 207 mg/dL (12-28 @ 12:07)  POCT Blood Glucose.: 142 mg/dL (12-28 @ 07:11)  POCT Blood Glucose.: 106 mg/dL (12-28 @ 00:08)  POCT Blood Glucose.: 166 mg/dL (12-27 @ 18:25)      Skin per nursing documentation: unstageable sacrum, stage 2 lumbar spine   Edema: none     Estimated Needs:   [x ] no change since previous assessment  [ ] recalculated:     Previous Nutrition Diagnosis: Increased nutrient needs    Nutrition Diagnosis is: on going     New Nutrition Diagnosis: Malnutrition (severe)   Related to: related to unknown etiology, chronic illness, liquid stools  as evidenced by severe muscle and fat depletion, >5% wt loss x 1 month    Interventions:   Recommend  1) Per discussion with team, plan to resume bolus feeds of Vital AF @ 200ml q 4hrs (5 feeds per day from 8am-10pm). Regimen to provide 1000ml total volume, 1200kcal (28Kcal/Kg) and 75g (1.7g/Kg) protein and 811ml free water based on current wt of 42.2Kg. Defer additional free water to team. Regimen provides 811ml free water.  *If pt continues with liquid/loose BMs may consider change to Pivot 1.5 @ 35ml/hr x 24 hrs via PEG as pt with good tolerance to formula during past admission.  2) Per discussion with team, plan to hold off starting imodium. Recommend continue Banatrol Plus TID.   3) Malnutrition sticker placed in EMR.     * Discussed with BRET Nielsen   Monitoring and Evaluation:     Continue to monitor Nutritional intake, Tolerance to diet prescription, weights, labs, skin integrity    RD remains available upon request and will follow up per protocol  Shasha Hare RD, CDN, Pager # [phone]

## 2018-12-29 LAB
GLUCOSE BLDC GLUCOMTR-MCNC: 140 MG/DL — HIGH (ref 70–99)
GLUCOSE BLDC GLUCOMTR-MCNC: 168 MG/DL — HIGH (ref 70–99)
GLUCOSE BLDC GLUCOMTR-MCNC: 174 MG/DL — HIGH (ref 70–99)
GLUCOSE BLDC GLUCOMTR-MCNC: 97 MG/DL — SIGNIFICANT CHANGE UP (ref 70–99)

## 2018-12-29 PROCEDURE — 99232 SBSQ HOSP IP/OBS MODERATE 35: CPT

## 2018-12-29 PROCEDURE — 99223 1ST HOSP IP/OBS HIGH 75: CPT

## 2018-12-29 RX ORDER — IPRATROPIUM/ALBUTEROL SULFATE 18-103MCG
3 AEROSOL WITH ADAPTER (GRAM) INHALATION EVERY 6 HOURS
Qty: 0 | Refills: 0 | Status: DISCONTINUED | OUTPATIENT
Start: 2018-12-29 | End: 2019-01-16

## 2018-12-29 RX ORDER — IPRATROPIUM BROMIDE 0.2 MG/ML
500 SOLUTION, NON-ORAL INHALATION EVERY 6 HOURS
Qty: 0 | Refills: 0 | Status: DISCONTINUED | OUTPATIENT
Start: 2018-12-29 | End: 2019-01-16

## 2018-12-29 RX ADMIN — MIRTAZAPINE 7.5 MILLIGRAM(S): 45 TABLET, ORALLY DISINTEGRATING ORAL at 21:49

## 2018-12-29 RX ADMIN — Medication 1 APPLICATION(S): at 06:33

## 2018-12-29 RX ADMIN — QUETIAPINE FUMARATE 25 MILLIGRAM(S): 200 TABLET, FILM COATED ORAL at 21:50

## 2018-12-29 RX ADMIN — Medication 1 APPLICATION(S): at 22:01

## 2018-12-29 RX ADMIN — Medication 1: at 12:20

## 2018-12-29 RX ADMIN — TIZANIDINE 4 MILLIGRAM(S): 4 TABLET ORAL at 21:49

## 2018-12-29 RX ADMIN — Medication 1 APPLICATION(S): at 15:46

## 2018-12-29 RX ADMIN — SERTRALINE 50 MILLIGRAM(S): 25 TABLET, FILM COATED ORAL at 12:20

## 2018-12-29 RX ADMIN — BUDESONIDE AND FORMOTEROL FUMARATE DIHYDRATE 2 PUFF(S): 160; 4.5 AEROSOL RESPIRATORY (INHALATION) at 09:19

## 2018-12-29 RX ADMIN — Medication 0.5 MILLIGRAM(S): at 21:49

## 2018-12-29 RX ADMIN — FENTANYL CITRATE 1 PATCH: 50 INJECTION INTRAVENOUS at 07:00

## 2018-12-29 RX ADMIN — FENTANYL CITRATE 1 PATCH: 50 INJECTION INTRAVENOUS at 19:00

## 2018-12-29 RX ADMIN — MINOCYCLINE HYDROCHLORIDE 100 MILLIGRAM(S): 45 TABLET, EXTENDED RELEASE ORAL at 06:30

## 2018-12-29 RX ADMIN — Medication 3 MILLILITER(S): at 06:29

## 2018-12-29 RX ADMIN — TIOTROPIUM BROMIDE 1 CAPSULE(S): 18 CAPSULE ORAL; RESPIRATORY (INHALATION) at 12:20

## 2018-12-29 RX ADMIN — GABAPENTIN 300 MILLIGRAM(S): 400 CAPSULE ORAL at 06:33

## 2018-12-29 RX ADMIN — Medication 30 MILLIGRAM(S): at 09:19

## 2018-12-29 RX ADMIN — Medication 3 MILLILITER(S): at 12:20

## 2018-12-29 RX ADMIN — Medication 1 APPLICATION(S): at 06:29

## 2018-12-29 RX ADMIN — Medication 1 TABLET(S): at 12:20

## 2018-12-29 RX ADMIN — PANTOPRAZOLE SODIUM 40 MILLIGRAM(S): 20 TABLET, DELAYED RELEASE ORAL at 06:30

## 2018-12-29 RX ADMIN — APIXABAN 5 MILLIGRAM(S): 2.5 TABLET, FILM COATED ORAL at 18:24

## 2018-12-29 RX ADMIN — Medication 300 MILLIGRAM(S): at 12:20

## 2018-12-29 RX ADMIN — APIXABAN 5 MILLIGRAM(S): 2.5 TABLET, FILM COATED ORAL at 06:29

## 2018-12-29 RX ADMIN — BUDESONIDE AND FORMOTEROL FUMARATE DIHYDRATE 2 PUFF(S): 160; 4.5 AEROSOL RESPIRATORY (INHALATION) at 21:51

## 2018-12-29 RX ADMIN — GABAPENTIN 300 MILLIGRAM(S): 400 CAPSULE ORAL at 18:26

## 2018-12-29 RX ADMIN — TIZANIDINE 4 MILLIGRAM(S): 4 TABLET ORAL at 09:19

## 2018-12-29 RX ADMIN — MINOCYCLINE HYDROCHLORIDE 100 MILLIGRAM(S): 45 TABLET, EXTENDED RELEASE ORAL at 18:24

## 2018-12-29 RX ADMIN — Medication 1: at 18:44

## 2018-12-29 RX ADMIN — Medication 680 MILLIGRAM(S): at 18:56

## 2018-12-29 NOTE — PROGRESS NOTE ADULT - ASSESSMENT
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  The differential of fever includes infection,malignancy as well as inflammatory fever.  Known DVT in leg.  No clear source of infection  Recent CT of C/A/P  with no defined infection and recent   cultures and CDT remain negative  Will consider antifungal therapy but with no  obstruction it is uncommon to see systemic candidal infections from the urine  Now on different steroid supplement,so far no impact on fever curve  Her overall course is largely unchanged  Plan:   continue supportive care as per primary medical team   with repeat blood cultures and C.Diff toxin negative I would not make any changes at this point  My bias would be to support her medically and try to keep comfortable  No indications for additional antibiotics  Will continue to look for infection but she seems to be tolerating 1 month of fever well.  Leukocytosis remains unexplained but has moderated   sleeping at bedside and not awakened(seen at 530 AM)  I would favor a hospice disposition but I do not think family is ready for this    [place] guarded

## 2018-12-29 NOTE — PROGRESS NOTE ADULT - SUBJECTIVE AND OBJECTIVE BOX
---___---___---___---___---___---___ ---___---___---___---___---___---___---___---___---___---                    <<<  M E D I C A L   A T T E N D I N G    F O L L O W    U P   N O T E  >>>    - Patient seen and examined by me approximately thirty minutes ago.  - In summary, patient is a 68y year old Female who origianlly presented with chf now with dvt and persistent  -       ---___---___---___---___---___---      <<<  MEDICATIONS:  >>>    MEDICATIONS  (STANDING):  ALBUTerol/ipratropium for Nebulization 3 milliLiter(s) Nebulizer every 6 hours  apixaban 5 milliGRAM(s) Oral every 12 hours  AQUAPHOR (petrolatum Ointment) 1 Application(s) Topical three times a day  buDESOnide 160 MICROgram(s)/formoterol 4.5 MICROgram(s) Inhaler 2 Puff(s) Inhalation two times a day  clobetasol 0.05% Ointment 1 Application(s) Topical two times a day  clonazePAM Tablet 0.5 milliGRAM(s) Oral at bedtime  dextrose 5%. 1000 milliLiter(s) (50 mL/Hr) IV Continuous <Continuous>  dextrose 50% Injectable 12.5 Gram(s) IV Push once  dextrose 50% Injectable 25 Gram(s) IV Push once  dextrose 50% Injectable 25 Gram(s) IV Push once  fentaNYL   Patch  12 MICROgram(s)/Hr 1 Patch Transdermal every 72 hours  ferrous    sulfate Liquid 300 milliGRAM(s) Enteral Tube daily  gabapentin   Solution 300 milliGRAM(s) Oral two times a day  hydrocortisone 30 milliGRAM(s) Oral <User Schedule>  insulin lispro (HumaLOG) corrective regimen sliding scale   SubCutaneous every 6 hours  lactobacillus acidophilus 1 Tablet(s) Oral daily  lisinopril 5 milliGRAM(s) Oral daily  medical marijuana oil 1 milliLiter(s),medical marijuana oil 1 milliLiter(s),medical marijuana oil 1 milliLiter(s) 1 milliLiter(s) SubLingual <User Schedule>  minocycline 100 milliGRAM(s) Oral two times a day  mirtazapine 7.5 milliGRAM(s) Oral at bedtime  pantoprazole   Suspension 40 milliGRAM(s) Oral before breakfast  QUEtiapine 25 milliGRAM(s) Oral at bedtime  sertraline 50 milliGRAM(s) Oral daily  sodium chloride 0.9%. 250 milliLiter(s) (25 mL/Hr) IV Continuous <Continuous>  tiotropium 18 MICROgram(s) Capsule 1 Capsule(s) Inhalation daily  tiZANidine 4 milliGRAM(s) Oral <User Schedule>      MEDICATIONS  (PRN):  acetaminophen    Suspension .. 680 milliGRAM(s) Oral every 6 hours PRN Temp greater or equal to 38C (100.4F), Mild Pain (1 - 3)  dextrose 40% Gel 15 Gram(s) Oral once PRN Blood Glucose LESS THAN 70 milliGRAM(s)/deciliter  glucagon  Injectable 1 milliGRAM(s) IntraMuscular once PRN Glucose LESS THAN 70 milligrams/deciliter  nystatin Powder 1 Application(s) Topical two times a day PRN rash/itchiness       ---___---___---___---___---___---     <<<REVIEW OF SYSTEM: >>>    unable to obtain      ---___---___---___---___---___---          <<<  VITAL SIGNS: >>>    T(F): 99.1 (12-29-18 @ 09:16), Max: 102.2 (12-28-18 @ 19:59)  HR: 90 (12-29-18 @ 09:16) (81 - 90)  BP: 96/55 (12-29-18 @ 09:16) (96/55 - 114/67)  RR: 18 (12-29-18 @ 09:16) (18 - 18)  SpO2: 96% (12-29-18 @ 09:16) (95% - 97%)  Wt(kg): --  CAPILLARY BLOOD GLUCOSE      POCT Blood Glucose.: 140 mg/dL (29 Dec 2018 05:58)    I&O's Summary    28 Dec 2018 07:01  -  29 Dec 2018 07:00  --------------------------------------------------------  IN: 0 mL / OUT: 900 mL / NET: -900 mL    29 Dec 2018 07:01  -  29 Dec 2018 10:09  --------------------------------------------------------  IN: 260 mL / OUT: 0 mL / NET: 260 mL         ---___---___---___---___---___---                       PHYSICAL EXAM:    GEN: A&O X 0, NAD , comfortable  HEENT: NCAT, PERRL, MMM, no scleral icterus, hearing intact  NECK: Supple, No JVD  CVS: S1S2 , regular , No M/R/G appreciated  PULM: CTA B/L,  no W/R/R appreciated  ABD.: soft. non tender, non distended,  bowel sounds present peg present   Extrem: intact pulses , no edema noted  Derm: sacral decubitus noted   PSYCH: normal mood, no depression, not anxious     ---___---___---___---___---___---     <<<  LAB AND IMAGING: >>>                          9.7    12.61 )-----------( 354      ( 28 Dec 2018 07:02 )             30.8               12-28    141  |  104  |  68<H>  ----------------------------<  108<H>  4.5   |  21<L>  |  0.59    Ca    9.8      28 Dec 2018 05:27                                 [All pertinent / recent available Imaging reports and other labs reviewed]     ---___---___---___---___---___---___ ---___---___---___---___---           <<<  A S S E S S M E N T   A N YE   P L A N :  >>>          -GI/DVT Prophylaxis.    --------------------------------------------  Case discussed with   Education given on     >>______________________<<      Deniz Bolden .         phone   [phone]

## 2018-12-29 NOTE — PROGRESS NOTE ADULT - SUBJECTIVE AND OBJECTIVE BOX
CC: f/u for fever    Patient reports: she is non verbal, still experiencing fever    REVIEW OF SYSTEMS:  All other review of systems negative (Comprehensive ROS): no respiratory distress, receiving peg feeds    Antimicrobials Day #  :  minocycline 100 milliGRAM(s) Oral two times a day    Other Medications Reviewed    T(F): 98.2 (12-29-18 @ 04:19), Max: 102.2 (12-28-18 @ 19:59)  HR: 81 (12-29-18 @ 06:27)  BP: 97/56 (12-29-18 @ 06:27)  RR: 18 (12-29-18 @ 04:19)  SpO2: 95% (12-29-18 @ 04:19)  Wt(kg): --    PHYSICAL EXAM:  General:lethargic, no acute distress  Eyes:  anicteric, no conjunctival injection, no discharge  Oropharynx: no lesions or injection 	  Neck: supple, without adenopathy  Lungs: few ronchi  Heart: regular rate and rhythm; no murmur, rubs or gallops  Abdomen: soft, nondistended, nontender, without mass or organomegaly  Skin: no lesions  Extremities: no clubbing, cyanosis, or edema, slightly contracted  Neurologic: lethargic, poorly interactive    LAB RESULTS:                        9.7    12.61 )-----------( 354      ( 28 Dec 2018 07:02 )             30.8     12-28    141  |  104  |  68<H>  ----------------------------<  108<H>  4.5   |  21<L>  |  0.59    Ca    9.8      28 Dec 2018 05:27          MICROBIOLOGY:  RECENT CULTURES:  12-27 @ 08:39 .Blood Blood     No growth to date.      12-25 @ 08:21 .Stool Feces     No enteric pathogens isolated.  (Stool culture examined for Salmonella,  Shigella, Campylobacter, Aeromonas, Plesiomonas,  Vibrio, E.coli O157 and Yersinia)          RADIOLOGY REVIEWED:

## 2018-12-29 NOTE — PROGRESS NOTE ADULT - SUBJECTIVE AND OBJECTIVE BOX
HPI:  Clinically better today, but still occasionally febrile.    Review Of Systems:         Unable to assess in the setting of dementia    Medications:  acetaminophen    Suspension .. 680 milliGRAM(s) Oral every 6 hours PRN  ALBUTerol/ipratropium for Nebulization 3 milliLiter(s) Nebulizer every 6 hours  apixaban 5 milliGRAM(s) Oral every 12 hours  AQUAPHOR (petrolatum Ointment) 1 Application(s) Topical three times a day  buDESOnide 160 MICROgram(s)/formoterol 4.5 MICROgram(s) Inhaler 2 Puff(s) Inhalation two times a day  clobetasol 0.05% Ointment 1 Application(s) Topical two times a day  clonazePAM Tablet 0.5 milliGRAM(s) Oral at bedtime  dextrose 40% Gel 15 Gram(s) Oral once PRN  dextrose 5%. 1000 milliLiter(s) IV Continuous <Continuous>  dextrose 50% Injectable 12.5 Gram(s) IV Push once  dextrose 50% Injectable 25 Gram(s) IV Push once  dextrose 50% Injectable 25 Gram(s) IV Push once  fentaNYL   Patch  12 MICROgram(s)/Hr 1 Patch Transdermal every 72 hours  ferrous    sulfate Liquid 300 milliGRAM(s) Enteral Tube daily  gabapentin   Solution 300 milliGRAM(s) Oral two times a day  glucagon  Injectable 1 milliGRAM(s) IntraMuscular once PRN  insulin lispro (HumaLOG) corrective regimen sliding scale   SubCutaneous every 6 hours  lactobacillus acidophilus 1 Tablet(s) Oral daily  lisinopril 5 milliGRAM(s) Oral daily  medical marijuana oil 1 milliLiter(s),medical marijuana oil 1 milliLiter(s),medical marijuana oil 1 milliLiter(s) 1 milliLiter(s) SubLingual <User Schedule>  minocycline 100 milliGRAM(s) Oral two times a day  mirtazapine 7.5 milliGRAM(s) Oral at bedtime  nystatin Powder 1 Application(s) Topical two times a day PRN  pantoprazole   Suspension 40 milliGRAM(s) Oral before breakfast  predniSONE  Solution 7.5 milliGRAM(s) Enteral Tube daily  QUEtiapine 25 milliGRAM(s) Oral at bedtime  sertraline 50 milliGRAM(s) Oral daily  sodium chloride 0.9%. 250 milliLiter(s) IV Continuous <Continuous>  tiotropium 18 MICROgram(s) Capsule 1 Capsule(s) Inhalation daily  tiZANidine 4 milliGRAM(s) Oral <User Schedule>    PAST MEDICAL & SURGICAL HISTORY:  CVA (cerebral vascular accident)  Fatty pancreas  PNA (pneumonia)  Pulmonary HTN  IGT (impaired glucose tolerance)  Ulcerative colitis  Acid reflux  Anxiety  Depression  Mouth sores  HLD (hyperlipidemia)  Asthma  Humeral head fracture  H/O: hysterectomy  H/O cataract extraction, left  History of knee replacement    Vitals:  T(C): 37.3 (12-29-18 @ 09:16), Max: 39 (12-28-18 @ 19:59)  HR: 90 (12-29-18 @ 09:16) (81 - 90)  BP: 96/55 (12-29-18 @ 09:16) (96/55 - 114/67)  BP(mean): --  RR: 18 (12-29-18 @ 09:16) (18 - 18)  SpO2: 96% (12-29-18 @ 09:16) (95% - 97%)  Wt(kg): --  Daily     Daily   I&O's Summary    28 Dec 2018 07:01  -  29 Dec 2018 07:00  --------------------------------------------------------  IN: 0 mL / OUT: 900 mL / NET: -900 mL    29 Dec 2018 07:01  -  29 Dec 2018 13:09  --------------------------------------------------------  IN: 520 mL / OUT: 0 mL / NET: 520 mL        Physical Exam:  Appearance: appears older than stated age; bedbound, demented; no acute distress  Eyes: PERRL, EOMI, pink conjunctiva  HENT: Normal oral mucosa  Cardiovascular: RRR, S1, S2; no LE edema bilaterally; normal JVP  Respiratory: poor inspiratory effort  Gastrointestinal: soft, non-tender, non-distended with normal bowel sounds; +PEG  Musculoskeletal: Bedbound, contracted  Neurologic: cranial nerves grossly intact  Lymphatic: No lymphadenopathy  Psychiatry: awake and alert  Skin: No rashes, ecchymoses, or cyanosis                            9.7    12.61 )-----------( 354      ( 28 Dec 2018 07:02 )             30.8     12-28    141  |  104  |  68<H>  ----------------------------<  108<H>  4.5   |  21<L>  |  0.59    Ca    9.8      28 Dec 2018 05:27            Echo: < from: Transthoracic Echocardiogram (12.03.18 @ 11:23) >  ------------------------------------------------------------------------  Conclusions:  1. Mitral annular calcification, otherwise normal mitral  valve. Moderate-severe mitral regurgitation (vena contracta  0.8 cm)  2. Calcified trileafletaortic valve with normal opening.  Peak transaortic valve gradient equals 3 mm Hg, mean  transaortic valve gradient equals 1 mm Hg, aortic valve  velocity time integral equals 13 cm. Moderate aortic  regurgitation  3. Moderately dilated left atrium.LA volume index = 45  cc/m2.  4. Severe global left ventricular systolic dysfunction.  5. Increased E/e'  is consistent with elevated left  ventricular filling pressure.  6. Moderate right atrial enlargement. Inferior vena cava is  dilated (>=2.5cm) with normal respiratory variability  consistent with right atrial pressure 16-20mm Hg.  7. Right ventricular enlargement with decreased right  ventricular systolic function.  8. Normal tricuspid valve. Severe tricuspid regurgitation.  9. Estimated pulmonary artery systolic pressure equals 82  mm Hg, assuming right atrial pressure equals 8 mm Hg,  consistent with severe pulmonary pressures.  10. Color Doppler demonstrates evidence of left to right  shunt  ------------------------------------------------------------------------  Confirmed on  12/3/2018 - 16:37:07 by Margaret Bill, M.D.  ------------------------------------------------------------------------    < end of copied text >    Imaging: < from: Xray Chest 1 View- PORTABLE-Urgent (12.02.18 @ 17:19) >  IMPRESSION:   Limited study due to patient positioning.  Trace right pleural effusion and likely left pleural effusion. Question   mild pulmonary edema.    HUAN JACOBS M.D., RADIOLOGY RESIDENT  This document has been electronically signed.  TONYA AARON M.D., ATTENDING RADIOLOGIST  This document has been electronically signed. Dec  2 2018  9:17PM      < end of copied text >    < from: [facility] Duplex Lower Ext Vein Scan, Bilat (12.15.18 @ 14:01) >  IMPRESSION:     Acute, above the knee deep venous thrombosis extending from the right   common femoral vein through the popliteal vein and into the right   gastrocnemius vein.    No left lower extremity DVT.    The above findings were discussed with BENJI Ryan by Dr. Tapia on   12/15/2018 2:43 PM, with read-back verification.    YELENA TAPIA M.D., RADIOLOGY RESIDENT  This document has been electronically signed.  PRIMO MURILLO M.D., ATTENDING RADIOLOGIST  This document has been electronically signed. Dec 15 2018  3:55PM        < end of copied text >    < from: [facility] Pulmonary Ventilation/Perfusion Scan (12.15.18 @ 10:13) >  FINDINGS: There is heterogeneous distribution of radiopharmaceutical in   both lungs on the ventilation and perfusion images. There are no   segmental perfusion defects in either lung. No significant interval   change since the previous study of 12/3/2018.    IMPRESSION: Very low probability of pulmonary embolus.    KRYSTYNA JULIAN M.D., CHIEF OF NUCLEAR MEDICINE  This document has been electronically signed. Dec 15 2018 10:15AM    < end of copied text >    < from: CT Abdomen and Pelvis No Cont (12.22.18 @ 15:14) >  FINDINGS:    LOWER CHEST: Cardiomegaly.    LIVER: Within normal limits.  BILE DUCTS: Normal caliber.  GALLBLADDER: Cholecystectomy.  SPLEEN: Within normal limits.  PANCREAS: Within normal limits.  ADRENALS: Within normal limits.  KIDNEYS/URETERS: Within normal limits.    BLADDER: Collapsed with a Blackman catheter.  REPRODUCTIVE ORGANS: Limited evaluation secondary to extensive streak   artifact from right lower extremity hardware    BOWEL: Gastrostomy balloon within the stomach. No bowel obstruction.   Appendix is not visualized.  PERITONEUM: No ascites.  VESSELS:  Atheromatous disease of the abdominal aorta.  RETROPERITONEUM: No lymphadenopathy.    ABDOMINAL WALL: Anasarca.  BONES: Degenerative changes of the spine. Complete vertebral body height   loss of L2. Total right hip arthroplasty. Chronic bilateral rib fractures   and left acetabular fracture. Mild anterolisthesis of L4 over L5,   unchanged compared to prior exam of 8/27/2018.    IMPRESSION:    No acute intra-abdominal pathology or collection.    AMIRA GUTIERREZ M.D., RADIOLOGY RESIDENT  This document has been electronically signed.  MARKEL SIMMONS M.D., ATTENDING RADIOLOGIST  This document has been electronically signed. Dec 22 2018  4:52PM    < end of copied text >    Interpretation of Telemetry: Patient no longer on tele

## 2018-12-29 NOTE — PROGRESS NOTE ADULT - ASSESSMENT
68 year-old woman with Alzheimer's dementia seen to have volume overload and biventricular congestive heart failure.  TTE earlier on this admission shows reduced LVEF, elevated pulmonary pressures.  Given severely reduced LVEF, plan to continue ACEi and uptitrate as blood pressure permits. Currently on hold in light of hypotension.  Will defer starting beta-blocker in this patient as she has baseline restrictive airway disease and is beta-agonist inhalers.  Continue anticoagulation with apixaban 5mg BID for patient with age < 80 years and creatinine < 1.5 mg/dL.    Patient with recurrent fevers of unclear etiology. RLE DVT could be etiology of fevers.    ID input appreciated.  Rheumatology consult appreciated.

## 2018-12-29 NOTE — PROGRESS NOTE ADULT - SUBJECTIVE AND OBJECTIVE BOX
PULMONARY PROGRESS NOTE    MYRIAM HEATH  MRN-[id number]    Patient is a 68y old  Female who presents with a chief complaint of elevated proBNP (29 Dec 2018 10:08)      HPI:  -continues to be febrile, source unclear, no difficulty of breathing or wheeze,  at bedside.    ROS:   -changing tube feeds to bolus instead of continuous.    ACTIVE MEDICATION LIST:  MEDICATIONS  (STANDING):  ALBUTerol/ipratropium for Nebulization 3 milliLiter(s) Nebulizer every 6 hours  apixaban 5 milliGRAM(s) Oral every 12 hours  AQUAPHOR (petrolatum Ointment) 1 Application(s) Topical three times a day  buDESOnide 160 MICROgram(s)/formoterol 4.5 MICROgram(s) Inhaler 2 Puff(s) Inhalation two times a day  clobetasol 0.05% Ointment 1 Application(s) Topical two times a day  clonazePAM Tablet 0.5 milliGRAM(s) Oral at bedtime  dextrose 5%. 1000 milliLiter(s) (50 mL/Hr) IV Continuous <Continuous>  dextrose 50% Injectable 12.5 Gram(s) IV Push once  dextrose 50% Injectable 25 Gram(s) IV Push once  dextrose 50% Injectable 25 Gram(s) IV Push once  fentaNYL   Patch  12 MICROgram(s)/Hr 1 Patch Transdermal every 72 hours  ferrous    sulfate Liquid 300 milliGRAM(s) Enteral Tube daily  gabapentin   Solution 300 milliGRAM(s) Oral two times a day  hydrocortisone 30 milliGRAM(s) Oral <User Schedule>  insulin lispro (HumaLOG) corrective regimen sliding scale   SubCutaneous every 6 hours  lactobacillus acidophilus 1 Tablet(s) Oral daily  lisinopril 5 milliGRAM(s) Oral daily  medical marijuana oil 1 milliLiter(s),medical marijuana oil 1 milliLiter(s),medical marijuana oil 1 milliLiter(s) 1 milliLiter(s) SubLingual <User Schedule>  minocycline 100 milliGRAM(s) Oral two times a day  mirtazapine 7.5 milliGRAM(s) Oral at bedtime  pantoprazole   Suspension 40 milliGRAM(s) Oral before breakfast  QUEtiapine 25 milliGRAM(s) Oral at bedtime  sertraline 50 milliGRAM(s) Oral daily  sodium chloride 0.9%. 250 milliLiter(s) (25 mL/Hr) IV Continuous <Continuous>  tiotropium 18 MICROgram(s) Capsule 1 Capsule(s) Inhalation daily  tiZANidine 4 milliGRAM(s) Oral <User Schedule>    MEDICATIONS  (PRN):  acetaminophen    Suspension .. 680 milliGRAM(s) Oral every 6 hours PRN Temp greater or equal to 38C (100.4F), Mild Pain (1 - 3)  dextrose 40% Gel 15 Gram(s) Oral once PRN Blood Glucose LESS THAN 70 milliGRAM(s)/deciliter  glucagon  Injectable 1 milliGRAM(s) IntraMuscular once PRN Glucose LESS THAN 70 milligrams/deciliter  nystatin Powder 1 Application(s) Topical two times a day PRN rash/itchiness      EXAM:  Vital Signs Last 24 Hrs  T(C): 37.3 (29 Dec 2018 09:16), Max: 39 (28 Dec 2018 19:59)  T(F): 99.1 (29 Dec 2018 09:16), Max: 102.2 (28 Dec 2018 19:59)  HR: 90 (29 Dec 2018 09:16) (81 - 90)  BP: 96/55 (29 Dec 2018 09:16) (96/55 - 114/67)  BP(mean): --  RR: 18 (29 Dec 2018 09:16) (18 - 18)  SpO2: 96% (29 Dec 2018 09:16) (95% - 97%)    GENERAL: The patient is awake in no apparent distress.     LUNGS: Clear to auscultation without wheezing, rales or rhonchi; respirations unlabored    HEART: S1/S2    LABS/IMAGING: reviewed                        9.7    12.61 )-----------( 354      ( 28 Dec 2018 07:02 )             30.8     12-28    141  |  104  |  68<H>  ----------------------------<  108<H>  4.5   |  21<L>  |  0.59    Ca    9.8      28 Dec 2018 05:27        PROBLEM LIST:  68y Female with HEALTH ISSUES - PROBLEM Dx:  CHF exacerbation: CHF exacerbation  Fever: Fever  DVT (deep venous thrombosis): DVT (deep venous thrombosis)  Depression: Depression  Pulmonary HTN: Pulmonary HTN  Acute deep vein thrombosis (DVT) of other vein of lower extremity: Acute deep vein thrombosis (DVT) of other vein of lower extremity  PNA (pneumonia): PNA (pneumonia)  Ulcerative colitis: Ulcerative colitis  Anxiety: Anxiety  Moderate episode of recurrent major depressive disorder: Moderate episode of recurrent major depressive disorder  CVA (cerebral vascular accident): CVA (cerebral vascular accident)  IGT (impaired glucose tolerance): IGT (impaired glucose tolerance)  Suspected pulmonary embolism  Asthma: Asthma  Dysphagia, unspecified type: Dysphagia, unspecified type  Functional quadriplegia: Functional quadriplegia  Generalized edema: Generalized edema      RECS:  -etiology of fevers unclear, drug fevers? microaspiration events at night when lying flat? agree with change to bolus feeds  -could consider trying to simplify medication regimen, maybe dc spiriva as respiratory status has been ok, change duonebs to PRN wheeze/dyspnea.   will hold medical marijuana today as well.  -agree with ID note  -overall prognosis guarded    Luciana Mancini MD   [phone]

## 2018-12-30 DIAGNOSIS — R50.9 FEVER, UNSPECIFIED: ICD-10-CM

## 2018-12-30 LAB
ANION GAP SERPL CALC-SCNC: 15 MMOL/L — SIGNIFICANT CHANGE UP (ref 5–17)
BUN SERPL-MCNC: 72 MG/DL — HIGH (ref 7–23)
CALCIUM SERPL-MCNC: 10.1 MG/DL — SIGNIFICANT CHANGE UP (ref 8.4–10.5)
CHLORIDE SERPL-SCNC: 105 MMOL/L — SIGNIFICANT CHANGE UP (ref 96–108)
CO2 SERPL-SCNC: 22 MMOL/L — SIGNIFICANT CHANGE UP (ref 22–31)
CREAT SERPL-MCNC: 0.56 MG/DL — SIGNIFICANT CHANGE UP (ref 0.5–1.3)
CULTURE RESULTS: SIGNIFICANT CHANGE UP
GLUCOSE BLDC GLUCOMTR-MCNC: 119 MG/DL — HIGH (ref 70–99)
GLUCOSE BLDC GLUCOMTR-MCNC: 130 MG/DL — HIGH (ref 70–99)
GLUCOSE BLDC GLUCOMTR-MCNC: 138 MG/DL — HIGH (ref 70–99)
GLUCOSE BLDC GLUCOMTR-MCNC: 186 MG/DL — HIGH (ref 70–99)
GLUCOSE SERPL-MCNC: 120 MG/DL — HIGH (ref 70–99)
HCT VFR BLD CALC: 32.2 % — LOW (ref 34.5–45)
HGB BLD-MCNC: 10.3 G/DL — LOW (ref 11.5–15.5)
MCHC RBC-ENTMCNC: 29.9 PG — SIGNIFICANT CHANGE UP (ref 27–34)
MCHC RBC-ENTMCNC: 32 GM/DL — SIGNIFICANT CHANGE UP (ref 32–36)
MCV RBC AUTO: 93.6 FL — SIGNIFICANT CHANGE UP (ref 80–100)
PLATELET # BLD AUTO: 329 K/UL — SIGNIFICANT CHANGE UP (ref 150–400)
POTASSIUM SERPL-MCNC: 4.1 MMOL/L — SIGNIFICANT CHANGE UP (ref 3.5–5.3)
POTASSIUM SERPL-SCNC: 4.1 MMOL/L — SIGNIFICANT CHANGE UP (ref 3.5–5.3)
RBC # BLD: 3.44 M/UL — LOW (ref 3.8–5.2)
RBC # FLD: 15.7 % — HIGH (ref 10.3–14.5)
SODIUM SERPL-SCNC: 142 MMOL/L — SIGNIFICANT CHANGE UP (ref 135–145)
SPECIMEN SOURCE: SIGNIFICANT CHANGE UP
WBC # BLD: 12.56 K/UL — HIGH (ref 3.8–10.5)
WBC # FLD AUTO: 12.56 K/UL — HIGH (ref 3.8–10.5)

## 2018-12-30 PROCEDURE — 99232 SBSQ HOSP IP/OBS MODERATE 35: CPT

## 2018-12-30 RX ORDER — ACETAMINOPHEN 500 MG
1000 TABLET ORAL ONCE
Qty: 0 | Refills: 0 | Status: DISCONTINUED | OUTPATIENT
Start: 2018-12-30 | End: 2018-12-30

## 2018-12-30 RX ORDER — ACETAMINOPHEN 500 MG
650 TABLET ORAL ONCE
Qty: 0 | Refills: 0 | Status: COMPLETED | OUTPATIENT
Start: 2018-12-30 | End: 2018-12-30

## 2018-12-30 RX ADMIN — TIZANIDINE 4 MILLIGRAM(S): 4 TABLET ORAL at 09:10

## 2018-12-30 RX ADMIN — Medication 3 MILLILITER(S): at 12:16

## 2018-12-30 RX ADMIN — Medication 1 APPLICATION(S): at 06:26

## 2018-12-30 RX ADMIN — SERTRALINE 50 MILLIGRAM(S): 25 TABLET, FILM COATED ORAL at 12:15

## 2018-12-30 RX ADMIN — Medication 0.5 MILLIGRAM(S): at 22:47

## 2018-12-30 RX ADMIN — Medication 1 APPLICATION(S): at 22:49

## 2018-12-30 RX ADMIN — Medication 650 MILLIGRAM(S): at 12:15

## 2018-12-30 RX ADMIN — GABAPENTIN 300 MILLIGRAM(S): 400 CAPSULE ORAL at 18:58

## 2018-12-30 RX ADMIN — LISINOPRIL 5 MILLIGRAM(S): 2.5 TABLET ORAL at 06:13

## 2018-12-30 RX ADMIN — FENTANYL CITRATE 1 PATCH: 50 INJECTION INTRAVENOUS at 19:32

## 2018-12-30 RX ADMIN — FENTANYL CITRATE 1 PATCH: 50 INJECTION INTRAVENOUS at 07:54

## 2018-12-30 RX ADMIN — Medication 1 APPLICATION(S): at 12:16

## 2018-12-30 RX ADMIN — Medication 260 MILLIGRAM(S): at 12:00

## 2018-12-30 RX ADMIN — Medication 1 TABLET(S): at 12:15

## 2018-12-30 RX ADMIN — BUDESONIDE AND FORMOTEROL FUMARATE DIHYDRATE 2 PUFF(S): 160; 4.5 AEROSOL RESPIRATORY (INHALATION) at 10:44

## 2018-12-30 RX ADMIN — Medication 1: at 12:16

## 2018-12-30 RX ADMIN — APIXABAN 5 MILLIGRAM(S): 2.5 TABLET, FILM COATED ORAL at 06:16

## 2018-12-30 RX ADMIN — Medication 3 MILLILITER(S): at 17:43

## 2018-12-30 RX ADMIN — GABAPENTIN 300 MILLIGRAM(S): 400 CAPSULE ORAL at 06:13

## 2018-12-30 RX ADMIN — APIXABAN 5 MILLIGRAM(S): 2.5 TABLET, FILM COATED ORAL at 17:43

## 2018-12-30 RX ADMIN — Medication 3 MILLILITER(S): at 23:09

## 2018-12-30 RX ADMIN — QUETIAPINE FUMARATE 25 MILLIGRAM(S): 200 TABLET, FILM COATED ORAL at 22:47

## 2018-12-30 RX ADMIN — MINOCYCLINE HYDROCHLORIDE 100 MILLIGRAM(S): 45 TABLET, EXTENDED RELEASE ORAL at 17:43

## 2018-12-30 RX ADMIN — MIRTAZAPINE 7.5 MILLIGRAM(S): 45 TABLET, ORALLY DISINTEGRATING ORAL at 22:47

## 2018-12-30 RX ADMIN — MINOCYCLINE HYDROCHLORIDE 100 MILLIGRAM(S): 45 TABLET, EXTENDED RELEASE ORAL at 06:16

## 2018-12-30 RX ADMIN — TIZANIDINE 4 MILLIGRAM(S): 4 TABLET ORAL at 22:47

## 2018-12-30 RX ADMIN — FENTANYL CITRATE 1 PATCH: 50 INJECTION INTRAVENOUS at 22:49

## 2018-12-30 RX ADMIN — FENTANYL CITRATE 1 PATCH: 50 INJECTION INTRAVENOUS at 22:10

## 2018-12-30 RX ADMIN — BUDESONIDE AND FORMOTEROL FUMARATE DIHYDRATE 2 PUFF(S): 160; 4.5 AEROSOL RESPIRATORY (INHALATION) at 22:48

## 2018-12-30 RX ADMIN — PANTOPRAZOLE SODIUM 40 MILLIGRAM(S): 20 TABLET, DELAYED RELEASE ORAL at 06:17

## 2018-12-30 RX ADMIN — Medication 7.5 MILLIGRAM(S): at 06:13

## 2018-12-30 RX ADMIN — Medication 300 MILLIGRAM(S): at 12:15

## 2018-12-30 NOTE — PROGRESS NOTE ADULT - SUBJECTIVE AND OBJECTIVE BOX
HPI:  Ongoing fevers.  No significant change in clinical status.    Review Of Systems:           Respiratory: No shortness of breath, cough, or wheezing  Cardiovascular: No chest pain or palpitations  10 point review of systems is otherwise negative except as mentioned above        Medications:  acetaminophen    Suspension .. 680 milliGRAM(s) Oral every 6 hours PRN  ALBUTerol/ipratropium for Nebulization 3 milliLiter(s) Nebulizer every 6 hours  apixaban 5 milliGRAM(s) Oral every 12 hours  AQUAPHOR (petrolatum Ointment) 1 Application(s) Topical three times a day  buDESOnide 160 MICROgram(s)/formoterol 4.5 MICROgram(s) Inhaler 2 Puff(s) Inhalation two times a day  clobetasol 0.05% Ointment 1 Application(s) Topical two times a day  clonazePAM Tablet 0.5 milliGRAM(s) Oral at bedtime  dextrose 40% Gel 15 Gram(s) Oral once PRN  dextrose 5%. 1000 milliLiter(s) IV Continuous <Continuous>  dextrose 50% Injectable 12.5 Gram(s) IV Push once  dextrose 50% Injectable 25 Gram(s) IV Push once  dextrose 50% Injectable 25 Gram(s) IV Push once  fentaNYL   Patch  12 MICROgram(s)/Hr 1 Patch Transdermal every 72 hours  ferrous    sulfate Liquid 300 milliGRAM(s) Enteral Tube daily  gabapentin   Solution 300 milliGRAM(s) Oral two times a day  glucagon  Injectable 1 milliGRAM(s) IntraMuscular once PRN  insulin lispro (HumaLOG) corrective regimen sliding scale   SubCutaneous every 6 hours  ipratropium    for Nebulization 500 MICROGram(s) Nebulizer every 6 hours PRN  lactobacillus acidophilus 1 Tablet(s) Oral daily  lisinopril 5 milliGRAM(s) Oral daily  medical marijuana oil 1 milliLiter(s),medical marijuana oil 1 milliLiter(s),medical marijuana oil 1 milliLiter(s) 1 milliLiter(s) SubLingual <User Schedule>  minocycline 100 milliGRAM(s) Oral two times a day  mirtazapine 7.5 milliGRAM(s) Oral at bedtime  nystatin Powder 1 Application(s) Topical two times a day PRN  pantoprazole   Suspension 40 milliGRAM(s) Oral before breakfast  predniSONE  Solution 7.5 milliGRAM(s) Enteral Tube daily  QUEtiapine 25 milliGRAM(s) Oral at bedtime  sertraline 50 milliGRAM(s) Oral daily  sodium chloride 0.9%. 250 milliLiter(s) IV Continuous <Continuous>  tiZANidine 4 milliGRAM(s) Oral <User Schedule>    PAST MEDICAL & SURGICAL HISTORY:  CVA (cerebral vascular accident)  Fatty pancreas  PNA (pneumonia)  Pulmonary HTN  IGT (impaired glucose tolerance)  Ulcerative colitis  Acid reflux  Anxiety  Depression  Mouth sores  HLD (hyperlipidemia)  Asthma  Humeral head fracture  H/O: hysterectomy  H/O cataract extraction, left  History of knee replacement    Vitals:  T(C): 37.9 (12-30-18 @ 12:00), Max: 39.2 (12-30-18 @ 10:30)  HR: 90 (12-30-18 @ 10:30) (90 - 99)  BP: 112/62 (12-30-18 @ 10:30) (105/55 - 121/70)  BP(mean): --  RR: 17 (12-30-18 @ 10:30) (17 - 18)  SpO2: 98% (12-30-18 @ 10:30) (97% - 98%)  Wt(kg): --  Daily     Daily   I&O's Summary    29 Dec 2018 07:01  -  30 Dec 2018 07:00  --------------------------------------------------------  IN: 700 mL / OUT: 900 mL / NET: -200 mL    30 Dec 2018 07:01  -  30 Dec 2018 13:51  --------------------------------------------------------  IN: 460 mL / OUT: 0 mL / NET: 460 mL        Physical Exam:  Appearance: appears older than stated age; bedbound, demented; no acute distress  Eyes: PERRL, EOMI, pink conjunctiva  HENT: Normal oral mucosa  Cardiovascular: RRR, S1, S2; no LE edema bilaterally; normal JVP  Respiratory: poor inspiratory effort  Gastrointestinal: soft, non-tender, non-distended with normal bowel sounds; +PEG  Musculoskeletal: Bedbound, contracted  Neurologic: cranial nerves grossly intact  Lymphatic: No lymphadenopathy  Psychiatry: awake and alert  Skin: No rashes, ecchymoses, or cyanosis                          10.3   12.56 )-----------( 329      ( 30 Dec 2018 08:24 )             32.2     12-30    142  |  105  |  72<H>  ----------------------------<  120<H>  4.1   |  22  |  0.56    Ca    10.1      30 Dec 2018 05:53        Echo: < from: Transthoracic Echocardiogram (12.03.18 @ 11:23) >  ------------------------------------------------------------------------  Conclusions:  1. Mitral annular calcification, otherwise normal mitral  valve. Moderate-severe mitral regurgitation (vena contracta  0.8 cm)  2. Calcified trileafletaortic valve with normal opening.  Peak transaortic valve gradient equals 3 mm Hg, mean  transaortic valve gradient equals 1 mm Hg, aortic valve  velocity time integral equals 13 cm. Moderate aortic  regurgitation  3. Moderately dilated left atrium.LA volume index = 45  cc/m2.  4. Severe global left ventricular systolic dysfunction.  5. Increased E/e'  is consistent with elevated left  ventricular filling pressure.  6. Moderate right atrial enlargement. Inferior vena cava is  dilated (>=2.5cm) with normal respiratory variability  consistent with right atrial pressure 16-20mm Hg.  7. Right ventricular enlargement with decreased right  ventricular systolic function.  8. Normal tricuspid valve. Severe tricuspid regurgitation.  9. Estimated pulmonary artery systolic pressure equals 82  mm Hg, assuming right atrial pressure equals 8 mm Hg,  consistent with severe pulmonary pressures.  10. Color Doppler demonstrates evidence of left to right  shunt  ------------------------------------------------------------------------  Confirmed on  12/3/2018 - 16:37:07 by Margaret Khan M.D.  ------------------------------------------------------------------------    < end of copied text >    Imaging: < from: Xray Chest 1 View- PORTABLE-Urgent (12.02.18 @ 17:19) >  IMPRESSION:   Limited study due to patient positioning.  Trace right pleural effusion and likely left pleural effusion. Question   mild pulmonary edema.    HUAN JACOBS M.D., RADIOLOGY RESIDENT  This document has been electronically signed.  TONYA AARON M.D., ATTENDING RADIOLOGIST  This document has been electronically signed. Dec  2 2018  9:17PM      < end of copied text >    < from: [facility] Duplex Lower Ext Vein Scan, Bilat (12.15.18 @ 14:01) >  IMPRESSION:     Acute, above the knee deep venous thrombosis extending from the right   common femoral vein through the popliteal vein and into the right   gastrocnemius vein.    No left lower extremity DVT.    The above findings were discussed with BENJI Ryan by Dr. Tapia on   12/15/2018 2:43 PM, with read-back verification.    YELENA TAPIA M.D., RADIOLOGY RESIDENT  This document has been electronically signed.  PRIMO MURILLO M.D., ATTENDING RADIOLOGIST  This document has been electronically signed. Dec 15 2018  3:55PM        < end of copied text >    < from: [facility] Pulmonary Ventilation/Perfusion Scan (12.15.18 @ 10:13) >  FINDINGS: There is heterogeneous distribution of radiopharmaceutical in   both lungs on the ventilation and perfusion images. There are no   segmental perfusion defects in either lung. No significant interval   change since the previous study of 12/3/2018.    IMPRESSION: Very low probability of pulmonary embolus.    KRYSTYNA JULIAN M.D., CHIEF OF NUCLEAR MEDICINE  This document has been electronically signed. Dec 15 2018 10:15AM    < end of copied text >    < from: CT Abdomen and Pelvis No Cont (12.22.18 @ 15:14) >  FINDINGS:    LOWER CHEST: Cardiomegaly.    LIVER: Within normal limits.  BILE DUCTS: Normal caliber.  GALLBLADDER: Cholecystectomy.  SPLEEN: Within normal limits.  PANCREAS: Within normal limits.  ADRENALS: Within normal limits.  KIDNEYS/URETERS: Within normal limits.    BLADDER: Collapsed with a Blackman catheter.  REPRODUCTIVE ORGANS: Limited evaluation secondary to extensive streak   artifact from right lower extremity hardware    BOWEL: Gastrostomy balloon within the stomach. No bowel obstruction.   Appendix is not visualized.  PERITONEUM: No ascites.  VESSELS:  Atheromatous disease of the abdominal aorta.  RETROPERITONEUM: No lymphadenopathy.    ABDOMINAL WALL: Anasarca.  BONES: Degenerative changes of the spine. Complete vertebral body height   loss of L2. Total right hip arthroplasty. Chronic bilateral rib fractures   and left acetabular fracture. Mild anterolisthesis of L4 over L5,   unchanged compared to prior exam of 8/27/2018.    IMPRESSION:    No acute intra-abdominal pathology or collection.    AMIRA GUTIERREZ M.D., RADIOLOGY RESIDENT  This document has been electronically signed.  MARKEL SIMMONS M.D., ATTENDING RADIOLOGIST  This document has been electronically signed. Dec 22 2018  4:52PM    < end of copied text >    Interpretation of Telemetry: Patient no longer on tele

## 2018-12-30 NOTE — PROGRESS NOTE ADULT - ASSESSMENT
68 year-old woman with Alzheimer's dementia seen to have volume overload and biventricular congestive heart failure.  TTE earlier on this admission shows reduced LVEF, elevated pulmonary pressures.  Given severely reduced LVEF, plan to continue ACEi and uptitrate as blood pressure permits. Currently on hold in light of hypotension.  Will defer starting beta-blocker in this patient as she has baseline restrictive airway disease and is beta-agonist inhalers.  Continue anticoagulation with apixaban 5mg BID for patient with age < 80 years and creatinine < 1.5 mg/dL.    Patient with recurrent fevers of unclear etiology. RLE DVT could be etiology of fevers.  Consider treatment of fungus in the urine.    ID input appreciated.  Rheumatology consult appreciated.

## 2018-12-30 NOTE — PROGRESS NOTE ADULT - SUBJECTIVE AND OBJECTIVE BOX
---___---___---___---___---___---___ ---___---___---___---___---___---___---___---___---___---                    <<<  M E D I C A L   A T T E N D I N G    F O L L O W    U P   N O T E  >>>    Tmax 100.5 in the past 24 hours. fever is waning    ---___---___---___---___---___---      <<<  MEDICATIONS:  >>>    MEDICATIONS  (STANDING):  ALBUTerol/ipratropium for Nebulization 3 milliLiter(s) Nebulizer every 6 hours  apixaban 5 milliGRAM(s) Oral every 12 hours  AQUAPHOR (petrolatum Ointment) 1 Application(s) Topical three times a day  buDESOnide 160 MICROgram(s)/formoterol 4.5 MICROgram(s) Inhaler 2 Puff(s) Inhalation two times a day  clobetasol 0.05% Ointment 1 Application(s) Topical two times a day  clonazePAM Tablet 0.5 milliGRAM(s) Oral at bedtime  dextrose 5%. 1000 milliLiter(s) (50 mL/Hr) IV Continuous <Continuous>  dextrose 50% Injectable 12.5 Gram(s) IV Push once  dextrose 50% Injectable 25 Gram(s) IV Push once  dextrose 50% Injectable 25 Gram(s) IV Push once  fentaNYL   Patch  12 MICROgram(s)/Hr 1 Patch Transdermal every 72 hours  ferrous    sulfate Liquid 300 milliGRAM(s) Enteral Tube daily  gabapentin   Solution 300 milliGRAM(s) Oral two times a day  insulin lispro (HumaLOG) corrective regimen sliding scale   SubCutaneous every 6 hours  lactobacillus acidophilus 1 Tablet(s) Oral daily  lisinopril 5 milliGRAM(s) Oral daily  medical marijuana oil 1 milliLiter(s),medical marijuana oil 1 milliLiter(s),medical marijuana oil 1 milliLiter(s) 1 milliLiter(s) SubLingual <User Schedule>  minocycline 100 milliGRAM(s) Oral two times a day  mirtazapine 7.5 milliGRAM(s) Oral at bedtime  pantoprazole   Suspension 40 milliGRAM(s) Oral before breakfast  predniSONE  Solution 7.5 milliGRAM(s) Enteral Tube daily  QUEtiapine 25 milliGRAM(s) Oral at bedtime  sertraline 50 milliGRAM(s) Oral daily  sodium chloride 0.9%. 250 milliLiter(s) (25 mL/Hr) IV Continuous <Continuous>  tiZANidine 4 milliGRAM(s) Oral <User Schedule>      MEDICATIONS  (PRN):  acetaminophen    Suspension .. 680 milliGRAM(s) Oral every 6 hours PRN Temp greater or equal to 38C (100.4F), Mild Pain (1 - 3)  dextrose 40% Gel 15 Gram(s) Oral once PRN Blood Glucose LESS THAN 70 milliGRAM(s)/deciliter  glucagon  Injectable 1 milliGRAM(s) IntraMuscular once PRN Glucose LESS THAN 70 milligrams/deciliter  ipratropium    for Nebulization 500 MICROGram(s) Nebulizer every 6 hours PRN Respiratory Distress  nystatin Powder 1 Application(s) Topical two times a day PRN rash/itchiness       ---___---___---___---___---___---     <<<REVIEW OF SYSTEM: >>>    unable to obtain    ---___---___---___---___---___---          <<<  VITAL SIGNS: >>>    T(F): 98 (12-30-18 @ 04:11), Max: 100.5 (12-29-18 @ 18:55)  HR: 93 (12-30-18 @ 04:11) (90 - 99)  BP: 121/70 (12-30-18 @ 04:11) (105/55 - 121/70)  RR: 18 (12-30-18 @ 04:11) (17 - 18)  SpO2: 97% (12-30-18 @ 04:11) (97% - 98%)  Wt(kg): --  CAPILLARY BLOOD GLUCOSE      POCT Blood Glucose.: 138 mg/dL (30 Dec 2018 06:12)    I&O's Summary    29 Dec 2018 07:01  -  30 Dec 2018 07:00  --------------------------------------------------------  IN: 700 mL / OUT: 900 mL / NET: -200 mL    30 Dec 2018 07:01  -  30 Dec 2018 10:00  --------------------------------------------------------  IN: 460 mL / OUT: 0 mL / NET: 460 mL         ---___---___---___---___---___---                       PHYSICAL EXAM:    GEN: A&O X 3 , NAD , comfortable  HEENT: NCAT, PERRL, MMM, no scleral icterus, hearing intact  NECK: Supple, No JVD  CVS: S1S2 , regular , No M/R/G appreciated  PULM: CTA B/L,  no W/R/R appreciated  ABD.: soft. non tender, non distended,  bowel sounds present peg noted   Extrem: intact pulses , no edema noted  Derm: No rash or ecchymosis noted sacral decubitus noted   PSYCH:  ,  depression,       ---___---___---___---___---___---     <<<  LAB AND IMAGING: >>>                          10.3   12.56 )-----------( 329      ( 30 Dec 2018 08:24 )             32.2               12-30    142  |  105  |  72<H>  ----------------------------<  120<H>  4.1   |  22  |  0.56    Ca    10.1      30 Dec 2018 05:53                                 [All pertinent / recent available Imaging reports and other labs reviewed]     ---___---___---___---___---___---___ ---___---___---___---___---           <<<  A S S E S S M E N T   A N D   P L A N :  >>>          -GI/DVT Prophylaxis.    --------------------------------------------  Case discussed with   Education given on     >>______________________<<      Deniz Bolden .         phone   [phone]

## 2018-12-30 NOTE — PROGRESS NOTE ADULT - SUBJECTIVE AND OBJECTIVE BOX
CC: f/u for fever    Patient reports : she is non verbal, low grade fever overnight.    REVIEW OF SYSTEMS:  All other review of systems negative (Comprehensive ROS): limited by condition    Antimicrobials Day #  :  minocycline 100 milliGRAM(s) Oral two times a day    Other Medications Reviewed    T(F): 98 (12-30-18 @ 04:11), Max: 100.5 (12-29-18 @ 18:55)  HR: 93 (12-30-18 @ 04:11)  BP: 121/70 (12-30-18 @ 04:11)  RR: 18 (12-30-18 @ 04:11)  SpO2: 97% (12-30-18 @ 04:11)  Wt(kg): --    PHYSICAL EXAM:  General: sleepy, no acute distress  Eyes:  anicteric, no conjunctival injection, no discharge  Oropharynx: no lesions or injection 	  Neck: supple, without adenopathy  Lungs: clear to auscultation, diminished at bases  Heart: regular rate and rhythm; no murmur, rubs or gallops  Abdomen: soft, nondistended, nontender, peg in place  Skin: no lesions  Extremities: no clubbing, cyanosis, or edema  Neurologic: poorly interactive    LAB RESULTS:    12-30    142  |  105  |  72<H>  ----------------------------<  120<H>  4.1   |  22  |  0.56    Ca    10.1      30 Dec 2018 05:53          MICROBIOLOGY:  RECENT CULTURES:  12-29 @ 02:43 .Blood Blood-Peripheral     No growth to date.      12-28 @ 08:59 .Urine Catheterized     >100,000 CFU/ml Yeast like cells      12-27 @ 08:39 .Blood Blood     No growth to date.      12-25 @ 08:21 .Stool Feces     No enteric pathogens isolated.  (Stool culture examined for Salmonella,  Shigella, Campylobacter, Aeromonas, Plesiomonas,  Vibrio, E.coli O157 and Yersinia)          RADIOLOGY REVIEWED:

## 2018-12-30 NOTE — PROGRESS NOTE ADULT - ASSESSMENT
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  The differential of fever includes infection,malignancy as well as inflammatory fever.  Known DVT in leg.  No clear source of infection  Recent CT of C/A/P  with no defined infection and recent   cultures and CDT remain negative  Will consider antifungal therapy but with no  obstruction it is uncommon to see systemic candidal infections from the urine  Now on different steroid supplement,so far no impact on fever curve  Her overall course is largely unchanged.  appreciate pulmonary f/u, ? microaspiration,consider bolus feeds  Plan:   continue supportive care as per primary medical team   with repeat blood cultures and C.Diff toxin negative I would not make any changes at this point  My bias would be to support her medically and try to keep comfortable  No indications for additional antibiotics  Will continue to look for infection but she seems to be tolerating 1 month of fever well.  Leukocytosis remains unexplained but has moderated   sleeping at bedside and not awakened(seen at 530 AM)  I would favor a hospice disposition but I do not think family is ready for this    Benson guarded

## 2018-12-31 LAB
ANION GAP SERPL CALC-SCNC: 14 MMOL/L — SIGNIFICANT CHANGE UP (ref 5–17)
BUN SERPL-MCNC: 60 MG/DL — HIGH (ref 7–23)
CALCIUM SERPL-MCNC: 10.1 MG/DL — SIGNIFICANT CHANGE UP (ref 8.4–10.5)
CHLORIDE SERPL-SCNC: 104 MMOL/L — SIGNIFICANT CHANGE UP (ref 96–108)
CO2 SERPL-SCNC: 24 MMOL/L — SIGNIFICANT CHANGE UP (ref 22–31)
CREAT SERPL-MCNC: 0.5 MG/DL — SIGNIFICANT CHANGE UP (ref 0.5–1.3)
GLUCOSE BLDC GLUCOMTR-MCNC: 126 MG/DL — HIGH (ref 70–99)
GLUCOSE BLDC GLUCOMTR-MCNC: 162 MG/DL — HIGH (ref 70–99)
GLUCOSE BLDC GLUCOMTR-MCNC: 171 MG/DL — HIGH (ref 70–99)
GLUCOSE BLDC GLUCOMTR-MCNC: 179 MG/DL — HIGH (ref 70–99)
GLUCOSE SERPL-MCNC: 141 MG/DL — HIGH (ref 70–99)
HCT VFR BLD CALC: 31.6 % — LOW (ref 34.5–45)
HGB BLD-MCNC: 10.2 G/DL — LOW (ref 11.5–15.5)
MCHC RBC-ENTMCNC: 30.2 PG — SIGNIFICANT CHANGE UP (ref 27–34)
MCHC RBC-ENTMCNC: 32.3 GM/DL — SIGNIFICANT CHANGE UP (ref 32–36)
MCV RBC AUTO: 93.5 FL — SIGNIFICANT CHANGE UP (ref 80–100)
PLATELET # BLD AUTO: 315 K/UL — SIGNIFICANT CHANGE UP (ref 150–400)
POTASSIUM SERPL-MCNC: 4.4 MMOL/L — SIGNIFICANT CHANGE UP (ref 3.5–5.3)
POTASSIUM SERPL-SCNC: 4.4 MMOL/L — SIGNIFICANT CHANGE UP (ref 3.5–5.3)
RBC # BLD: 3.38 M/UL — LOW (ref 3.8–5.2)
RBC # FLD: 15.2 % — HIGH (ref 10.3–14.5)
SODIUM SERPL-SCNC: 142 MMOL/L — SIGNIFICANT CHANGE UP (ref 135–145)
WBC # BLD: 14.03 K/UL — HIGH (ref 3.8–10.5)
WBC # FLD AUTO: 14.03 K/UL — HIGH (ref 3.8–10.5)

## 2018-12-31 PROCEDURE — 71250 CT THORAX DX C-: CPT | Mod: 26

## 2018-12-31 PROCEDURE — 99232 SBSQ HOSP IP/OBS MODERATE 35: CPT

## 2018-12-31 RX ADMIN — LISINOPRIL 5 MILLIGRAM(S): 2.5 TABLET ORAL at 06:41

## 2018-12-31 RX ADMIN — MINOCYCLINE HYDROCHLORIDE 100 MILLIGRAM(S): 45 TABLET, EXTENDED RELEASE ORAL at 17:24

## 2018-12-31 RX ADMIN — TIZANIDINE 4 MILLIGRAM(S): 4 TABLET ORAL at 08:57

## 2018-12-31 RX ADMIN — Medication 1: at 06:44

## 2018-12-31 RX ADMIN — Medication 1 TABLET(S): at 12:07

## 2018-12-31 RX ADMIN — Medication 3 MILLILITER(S): at 12:07

## 2018-12-31 RX ADMIN — Medication 680 MILLIGRAM(S): at 08:55

## 2018-12-31 RX ADMIN — Medication 1 APPLICATION(S): at 06:43

## 2018-12-31 RX ADMIN — QUETIAPINE FUMARATE 25 MILLIGRAM(S): 200 TABLET, FILM COATED ORAL at 21:08

## 2018-12-31 RX ADMIN — PANTOPRAZOLE SODIUM 40 MILLIGRAM(S): 20 TABLET, DELAYED RELEASE ORAL at 06:41

## 2018-12-31 RX ADMIN — Medication 3 MILLILITER(S): at 06:41

## 2018-12-31 RX ADMIN — Medication 300 MILLIGRAM(S): at 12:06

## 2018-12-31 RX ADMIN — SERTRALINE 50 MILLIGRAM(S): 25 TABLET, FILM COATED ORAL at 12:07

## 2018-12-31 RX ADMIN — APIXABAN 5 MILLIGRAM(S): 2.5 TABLET, FILM COATED ORAL at 06:41

## 2018-12-31 RX ADMIN — BUDESONIDE AND FORMOTEROL FUMARATE DIHYDRATE 2 PUFF(S): 160; 4.5 AEROSOL RESPIRATORY (INHALATION) at 21:03

## 2018-12-31 RX ADMIN — Medication 1: at 12:18

## 2018-12-31 RX ADMIN — FENTANYL CITRATE 1 PATCH: 50 INJECTION INTRAVENOUS at 20:57

## 2018-12-31 RX ADMIN — Medication 7.5 MILLIGRAM(S): at 08:57

## 2018-12-31 RX ADMIN — Medication 1: at 17:26

## 2018-12-31 RX ADMIN — MINOCYCLINE HYDROCHLORIDE 100 MILLIGRAM(S): 45 TABLET, EXTENDED RELEASE ORAL at 06:44

## 2018-12-31 RX ADMIN — Medication 3 MILLILITER(S): at 17:25

## 2018-12-31 RX ADMIN — Medication 1 APPLICATION(S): at 13:02

## 2018-12-31 RX ADMIN — TIZANIDINE 4 MILLIGRAM(S): 4 TABLET ORAL at 21:02

## 2018-12-31 RX ADMIN — GABAPENTIN 300 MILLIGRAM(S): 400 CAPSULE ORAL at 17:26

## 2018-12-31 RX ADMIN — MIRTAZAPINE 7.5 MILLIGRAM(S): 45 TABLET, ORALLY DISINTEGRATING ORAL at 21:02

## 2018-12-31 RX ADMIN — APIXABAN 5 MILLIGRAM(S): 2.5 TABLET, FILM COATED ORAL at 17:25

## 2018-12-31 RX ADMIN — FENTANYL CITRATE 1 PATCH: 50 INJECTION INTRAVENOUS at 09:01

## 2018-12-31 RX ADMIN — GABAPENTIN 300 MILLIGRAM(S): 400 CAPSULE ORAL at 08:56

## 2018-12-31 RX ADMIN — Medication 1 APPLICATION(S): at 06:41

## 2018-12-31 RX ADMIN — Medication 0.5 MILLIGRAM(S): at 21:03

## 2018-12-31 RX ADMIN — Medication 1 APPLICATION(S): at 17:42

## 2018-12-31 NOTE — CHART NOTE - NSCHARTNOTEFT_GEN_A_CORE
Nutrition Follow Up Note  Patient seen for: initial malnutrition follow-up on 4MON     Chart reviewed, events noted. 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 Remains with fever of unknown etiology.     Source: daughter ( on phone), RN, medical record, pt non-verbal     Diet : NPO with enteral feeds     reports improvement in frequency of BMs since switching back to blous feeds of Vital AF 200ml (6 x per day) Last BM 12/30.     Enteral /Parenteral Nutrition:     New bed weight taken by RD per daughter request, wt noted as 98.1 lbs   Dosing wt 99.88 lbs (12/4)  12/3: 124lbs --? accuracy  12/20: 91lbs   12/21: 92.3 lbs  12/27: 93 lbs   12/31: 98.1 lbs ( taken by RD)  Pt noted with large wt fluctuations this admission, will continue to monitor.       Pertinent Medications: MEDICATIONS  (STANDING):  ALBUTerol/ipratropium for Nebulization 3 milliLiter(s) Nebulizer every 6 hours  apixaban 5 milliGRAM(s) Oral every 12 hours  AQUAPHOR (petrolatum Ointment) 1 Application(s) Topical three times a day  buDESOnide 160 MICROgram(s)/formoterol 4.5 MICROgram(s) Inhaler 2 Puff(s) Inhalation two times a day  clobetasol 0.05% Ointment 1 Application(s) Topical two times a day  clonazePAM Tablet 0.5 milliGRAM(s) Oral at bedtime  dextrose 5%. 1000 milliLiter(s) (50 mL/Hr) IV Continuous <Continuous>  dextrose 50% Injectable 12.5 Gram(s) IV Push once  dextrose 50% Injectable 25 Gram(s) IV Push once  dextrose 50% Injectable 25 Gram(s) IV Push once  fentaNYL   Patch  12 MICROgram(s)/Hr 1 Patch Transdermal every 72 hours  ferrous    sulfate Liquid 300 milliGRAM(s) Enteral Tube daily  gabapentin   Solution 300 milliGRAM(s) Oral two times a day  insulin lispro (HumaLOG) corrective regimen sliding scale   SubCutaneous every 6 hours  lactobacillus acidophilus 1 Tablet(s) Oral daily  lisinopril 5 milliGRAM(s) Oral daily  medical marijuana oil 1 milliLiter(s),medical marijuana oil 1 milliLiter(s),medical marijuana oil 1 milliLiter(s) 1 milliLiter(s) SubLingual <User Schedule>  minocycline 100 milliGRAM(s) Oral two times a day  mirtazapine 7.5 milliGRAM(s) Oral at bedtime  pantoprazole   Suspension 40 milliGRAM(s) Oral before breakfast  predniSONE  Solution 7.5 milliGRAM(s) Enteral Tube daily  QUEtiapine 25 milliGRAM(s) Oral at bedtime  sertraline 50 milliGRAM(s) Oral daily  sodium chloride 0.9%. 250 milliLiter(s) (25 mL/Hr) IV Continuous <Continuous>  tiZANidine 4 milliGRAM(s) Oral <User Schedule>    MEDICATIONS  (PRN):  acetaminophen    Suspension .. 680 milliGRAM(s) Oral every 6 hours PRN Temp greater or equal to 38C (100.4F), Mild Pain (1 - 3)  dextrose 40% Gel 15 Gram(s) Oral once PRN Blood Glucose LESS THAN 70 milliGRAM(s)/deciliter  glucagon  Injectable 1 milliGRAM(s) IntraMuscular once PRN Glucose LESS THAN 70 milligrams/deciliter  ipratropium    for Nebulization 500 MICROGram(s) Nebulizer every 6 hours PRN Respiratory Distress  nystatin Powder 1 Application(s) Topical two times a day PRN rash/itchiness    Pertinent Labs: 12-31 @ 06:34: Na 142, BUN 60<H>, Cr 0.50, <H>, K+ 4.4, Phos --, Mg --, Alk Phos --, ALT/SGPT --, AST/SGOT --, HbA1c --    Finger Sticks:  POCT Blood Glucose.: 162 mg/dL (12-31 @ 06:20)  POCT Blood Glucose.: 130 mg/dL (12-30 @ 17:42)  POCT Blood Glucose.: 186 mg/dL (12-30 @ 11:55)      Skin per nursing documentation: unstageable sacrum, stage 2 lumbar spine   Edema: none     Estimated Needs:   [x ] no change since previous assessment  [ ] recalculated:     Previous Nutrition Diagnosis: Increased nutrient needs and Malnutrition (severe)  Nutrition Diagnosis is: on going    New Nutrition Diagnosis: N/A     Interventions:   Recommend       Monitoring and Evaluation:     Continue to monitor Nutritional intake, Tolerance to diet prescription, weights, labs, skin integrity    RD remains available upon request and will follow up per protocol  Shasha Hare RD, CDN, Pager # [phone] Nutrition Follow Up Note  Patient seen for: initial malnutrition follow-up on 4MON     Chart reviewed, events noted. 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 Remains with fever of unknown etiology.     Source: daughter ( on phone), RN, medical record, pt non-verbal     Diet : NPO with enteral feeds     reports improvement in frequency of BMs since switching back to bolus feeds of Vital AF 200ml q4hrs. Last BM 12/30. Pt also ordered for Banatrol TID, per RN pt has not received Banatrol this morning.      Enteral /Parenteral Nutrition:  Vital AF @ 200ml q4hrs (total volume 24hrs: 1200ml, 6 feeds per day)    New bed weight taken by RD per daughter request, wt noted as 98.1 lbs   Dosing wt 99.88 lbs (12/4)  12/3: 124lbs --? accuracy  12/20: 91lbs   12/21: 92.3 lbs  12/27: 93 lbs   12/31: 98.1 lbs (taken by RD)  Pt noted with large wt fluctuations this admission, will continue to monitor weight trends.       Pertinent Medications: MEDICATIONS  (STANDING):  ALBUTerol/ipratropium for Nebulization 3 milliLiter(s) Nebulizer every 6 hours  apixaban 5 milliGRAM(s) Oral every 12 hours  AQUAPHOR (petrolatum Ointment) 1 Application(s) Topical three times a day  buDESOnide 160 MICROgram(s)/formoterol 4.5 MICROgram(s) Inhaler 2 Puff(s) Inhalation two times a day  clobetasol 0.05% Ointment 1 Application(s) Topical two times a day  clonazePAM Tablet 0.5 milliGRAM(s) Oral at bedtime  dextrose 5%. 1000 milliLiter(s) (50 mL/Hr) IV Continuous <Continuous>  dextrose 50% Injectable 12.5 Gram(s) IV Push once  dextrose 50% Injectable 25 Gram(s) IV Push once  dextrose 50% Injectable 25 Gram(s) IV Push once  fentaNYL   Patch  12 MICROgram(s)/Hr 1 Patch Transdermal every 72 hours  ferrous    sulfate Liquid 300 milliGRAM(s) Enteral Tube daily  gabapentin   Solution 300 milliGRAM(s) Oral two times a day  insulin lispro (HumaLOG) corrective regimen sliding scale   SubCutaneous every 6 hours  lactobacillus acidophilus 1 Tablet(s) Oral daily  lisinopril 5 milliGRAM(s) Oral daily  medical marijuana oil 1 milliLiter(s),medical marijuana oil 1 milliLiter(s),medical marijuana oil 1 milliLiter(s) 1 milliLiter(s) SubLingual <User Schedule>  minocycline 100 milliGRAM(s) Oral two times a day  mirtazapine 7.5 milliGRAM(s) Oral at bedtime  pantoprazole   Suspension 40 milliGRAM(s) Oral before breakfast  predniSONE  Solution 7.5 milliGRAM(s) Enteral Tube daily  QUEtiapine 25 milliGRAM(s) Oral at bedtime  sertraline 50 milliGRAM(s) Oral daily  sodium chloride 0.9%. 250 milliLiter(s) (25 mL/Hr) IV Continuous <Continuous>  tiZANidine 4 milliGRAM(s) Oral <User Schedule>    MEDICATIONS  (PRN):  acetaminophen    Suspension .. 680 milliGRAM(s) Oral every 6 hours PRN Temp greater or equal to 38C (100.4F), Mild Pain (1 - 3)  dextrose 40% Gel 15 Gram(s) Oral once PRN Blood Glucose LESS THAN 70 milliGRAM(s)/deciliter  glucagon  Injectable 1 milliGRAM(s) IntraMuscular once PRN Glucose LESS THAN 70 milligrams/deciliter  ipratropium    for Nebulization 500 MICROGram(s) Nebulizer every 6 hours PRN Respiratory Distress  nystatin Powder 1 Application(s) Topical two times a day PRN rash/itchiness    Pertinent Labs: 12-31 @ 06:34: Na 142, BUN 60<H>, Cr 0.50, <H>, K+ 4.4, Phos --, Mg --, Alk Phos --, ALT/SGPT --, AST/SGOT --, HbA1c --    Finger Sticks:  POCT Blood Glucose.: 162 mg/dL (12-31 @ 06:20)  POCT Blood Glucose.: 130 mg/dL (12-30 @ 17:42)  POCT Blood Glucose.: 186 mg/dL (12-30 @ 11:55)      Skin per nursing documentation: unstageable sacrum, stage 2 lumbar spine   Edema: none     Estimated Needs:   [ ] no change since previous assessment  [x] recalculated: in setting of malnutrition, pressure injuries, persistent fevers  30-35Kcal: 1335-1558Kcal/day  1.5-2Gm: 66.7-89Gm/day       Previous Nutrition Diagnosis: Increased nutrient needs and Malnutrition (severe)  Nutrition Diagnosis is: on going    New Nutrition Diagnosis: N/A     Interventions:   Recommend  1. Continue bolus feeds of Vital AF @ 200ml q4hrs. Regimen to provide 1200ml total volume, 1440kcal (31.7Kcal/Kg) and 75g (2Gm/Kg) protein and 973ml free water based on dosing weight of 44.5Kg. Defer additional free water to team.   2. Recommend continue Banatrol Plus TID in setting of loose BMs.   3. Monitor tolerance to EN and need to adjust EN regimen.       Monitoring and Evaluation:     Continue to monitor Nutritional intake, Tolerance to diet prescription, weights, labs, skin integrity    RD remains available upon request and will follow up per protocol  Shasha Hare RD, CDN, Pager # [phone] Nutrition Follow Up Note  Patient seen for: initial malnutrition follow-up on 4MON     Chart reviewed, events noted. 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 Remains with fever of unknown etiology.     Source: daughter ( on phone), RN, medical record, pt non-verbal     Diet : NPO with enteral feeds     reports improvement in frequency of BMs since switching back to bolus feeds of Vital AF 200ml q4hrs. Last BM 12/30. Pt also ordered for Banatrol TID, per RN pt has not received Banatrol this morning, reinforced importance of giving in setting of loose BMs.      Enteral /Parenteral Nutrition:  Vital AF @ 200ml q4hrs (total volume 24hrs: 1200ml, 6 feeds per day)    New bed weight taken by RD per daughter request, wt noted as 98.1 lbs   Dosing wt 99.88 lbs (12/4)  12/3: 124lbs --? accuracy  12/20: 91lbs   12/21: 92.3 lbs  12/27: 93 lbs   12/31: 98.1 lbs (taken by RD)  Pt noted with large wt fluctuations this admission, will continue to monitor weight trends.       Pertinent Medications: MEDICATIONS  (STANDING):  ALBUTerol/ipratropium for Nebulization 3 milliLiter(s) Nebulizer every 6 hours  apixaban 5 milliGRAM(s) Oral every 12 hours  AQUAPHOR (petrolatum Ointment) 1 Application(s) Topical three times a day  buDESOnide 160 MICROgram(s)/formoterol 4.5 MICROgram(s) Inhaler 2 Puff(s) Inhalation two times a day  clobetasol 0.05% Ointment 1 Application(s) Topical two times a day  clonazePAM Tablet 0.5 milliGRAM(s) Oral at bedtime  dextrose 5%. 1000 milliLiter(s) (50 mL/Hr) IV Continuous <Continuous>  dextrose 50% Injectable 12.5 Gram(s) IV Push once  dextrose 50% Injectable 25 Gram(s) IV Push once  dextrose 50% Injectable 25 Gram(s) IV Push once  fentaNYL   Patch  12 MICROgram(s)/Hr 1 Patch Transdermal every 72 hours  ferrous    sulfate Liquid 300 milliGRAM(s) Enteral Tube daily  gabapentin   Solution 300 milliGRAM(s) Oral two times a day  insulin lispro (HumaLOG) corrective regimen sliding scale   SubCutaneous every 6 hours  lactobacillus acidophilus 1 Tablet(s) Oral daily  lisinopril 5 milliGRAM(s) Oral daily  medical marijuana oil 1 milliLiter(s),medical marijuana oil 1 milliLiter(s),medical marijuana oil 1 milliLiter(s) 1 milliLiter(s) SubLingual <User Schedule>  minocycline 100 milliGRAM(s) Oral two times a day  mirtazapine 7.5 milliGRAM(s) Oral at bedtime  pantoprazole   Suspension 40 milliGRAM(s) Oral before breakfast  predniSONE  Solution 7.5 milliGRAM(s) Enteral Tube daily  QUEtiapine 25 milliGRAM(s) Oral at bedtime  sertraline 50 milliGRAM(s) Oral daily  sodium chloride 0.9%. 250 milliLiter(s) (25 mL/Hr) IV Continuous <Continuous>  tiZANidine 4 milliGRAM(s) Oral <User Schedule>    MEDICATIONS  (PRN):  acetaminophen    Suspension .. 680 milliGRAM(s) Oral every 6 hours PRN Temp greater or equal to 38C (100.4F), Mild Pain (1 - 3)  dextrose 40% Gel 15 Gram(s) Oral once PRN Blood Glucose LESS THAN 70 milliGRAM(s)/deciliter  glucagon  Injectable 1 milliGRAM(s) IntraMuscular once PRN Glucose LESS THAN 70 milligrams/deciliter  ipratropium    for Nebulization 500 MICROGram(s) Nebulizer every 6 hours PRN Respiratory Distress  nystatin Powder 1 Application(s) Topical two times a day PRN rash/itchiness    Pertinent Labs: 12-31 @ 06:34: Na 142, BUN 60<H>, Cr 0.50, <H>, K+ 4.4, Phos --, Mg --, Alk Phos --, ALT/SGPT --, AST/SGOT --, HbA1c --    Finger Sticks:  POCT Blood Glucose.: 162 mg/dL (12-31 @ 06:20)  POCT Blood Glucose.: 130 mg/dL (12-30 @ 17:42)  POCT Blood Glucose.: 186 mg/dL (12-30 @ 11:55)      Skin per nursing documentation: unstageable sacrum, stage 2 lumbar spine   Edema: none     Estimated Needs:   [ ] no change since previous assessment  [x] recalculated: in setting of malnutrition, pressure injuries, persistent fevers  30-35Kcal: 1335-1558Kcal/day  1.5-2Gm: 66.7-89Gm/day       Previous Nutrition Diagnosis: Increased nutrient needs and Malnutrition (severe)  Nutrition Diagnosis is: on going    New Nutrition Diagnosis: N/A     Interventions:   Recommend  1. Continue bolus feeds of Vital AF @ 200ml q4hrs. Regimen to provide 1200ml total volume, 1440kcal (31.7Kcal/Kg) and 75g (2Gm/Kg) protein and 973ml free water based on dosing weight of 44.5Kg. Defer additional free water to team.   2. Recommend continue Banatrol Plus TID in setting of loose BMs. Provider to RN in EMR.   3. Monitor tolerance to EN and need to adjust EN regimen.       Monitoring and Evaluation:     Continue to monitor Nutritional intake, Tolerance to diet prescription, weights, labs, skin integrity    RD remains available upon request and will follow up per protocol  Shasha Hare RD, CDN, Pager # [phone]

## 2018-12-31 NOTE — PROGRESS NOTE ADULT - ASSESSMENT
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  The differential of fever includes infection,malignancy as well as inflammatory fever.  Known DVT in leg.  No clear source of infection  Recent CT of C/A/P  with no defined infection and recent   cultures and CDT remain negative.  Will consider antifungal therapy but with no  obstruction it is uncommon to see systemic candidal infections from the urine  Now on different steroid supplement,so far no impact on fever curve  Her overall course is largely unchanged.  appreciate pulmonary f/u, ? microaspiration,consider bolus feeds.  CT scan of chest 12/31 is negative  Plan:   continue supportive care as per primary medical team   with repeat blood cultures and C.Diff toxin negative I would not make any changes at this point  My bias would be to support her medically and try to keep comfortable  No indications for additional antibiotics  Will continue to look for infection but she seems to be tolerating 1 month of fever well.  Leukocytosis remains unexplained but appears to wax and wane  I would favor a hospice disposition but I do not think family is ready for this    Terra Bella guarded

## 2018-12-31 NOTE — PROGRESS NOTE ADULT - SUBJECTIVE AND OBJECTIVE BOX
HPI:  Patient seen and examined on 4 Monti with her daughter at the bedside.  Ongoing fevers.  No other change in clinical status.    Review Of Systems:         Unable to assess in the setting of dementia    Medications:  acetaminophen    Suspension .. 680 milliGRAM(s) Oral every 6 hours PRN  ALBUTerol/ipratropium for Nebulization 3 milliLiter(s) Nebulizer every 6 hours  apixaban 5 milliGRAM(s) Oral every 12 hours  AQUAPHOR (petrolatum Ointment) 1 Application(s) Topical three times a day  buDESOnide 160 MICROgram(s)/formoterol 4.5 MICROgram(s) Inhaler 2 Puff(s) Inhalation two times a day  clobetasol 0.05% Ointment 1 Application(s) Topical two times a day  clonazePAM Tablet 0.5 milliGRAM(s) Oral at bedtime  dextrose 40% Gel 15 Gram(s) Oral once PRN  dextrose 5%. 1000 milliLiter(s) IV Continuous <Continuous>  dextrose 50% Injectable 12.5 Gram(s) IV Push once  dextrose 50% Injectable 25 Gram(s) IV Push once  dextrose 50% Injectable 25 Gram(s) IV Push once  fentaNYL   Patch  12 MICROgram(s)/Hr 1 Patch Transdermal every 72 hours  ferrous    sulfate Liquid 300 milliGRAM(s) Enteral Tube daily  gabapentin   Solution 300 milliGRAM(s) Oral two times a day  glucagon  Injectable 1 milliGRAM(s) IntraMuscular once PRN  insulin lispro (HumaLOG) corrective regimen sliding scale   SubCutaneous every 6 hours  ipratropium    for Nebulization 500 MICROGram(s) Nebulizer every 6 hours PRN  lactobacillus acidophilus 1 Tablet(s) Oral daily  lisinopril 5 milliGRAM(s) Oral daily  medical marijuana oil 1 milliLiter(s),medical marijuana oil 1 milliLiter(s),medical marijuana oil 1 milliLiter(s) 1 milliLiter(s) SubLingual <User Schedule>  minocycline 100 milliGRAM(s) Oral two times a day  mirtazapine 7.5 milliGRAM(s) Oral at bedtime  nystatin Powder 1 Application(s) Topical two times a day PRN  pantoprazole   Suspension 40 milliGRAM(s) Oral before breakfast  predniSONE  Solution 7.5 milliGRAM(s) Enteral Tube daily  QUEtiapine 25 milliGRAM(s) Oral at bedtime  sertraline 50 milliGRAM(s) Oral daily  sodium chloride 0.9%. 250 milliLiter(s) IV Continuous <Continuous>  tiZANidine 4 milliGRAM(s) Oral <User Schedule>    PAST MEDICAL & SURGICAL HISTORY:  CVA (cerebral vascular accident)  Fatty pancreas  PNA (pneumonia)  Pulmonary HTN  IGT (impaired glucose tolerance)  Ulcerative colitis  Acid reflux  Anxiety  Depression  Mouth sores  HLD (hyperlipidemia)  Asthma  Humeral head fracture  H/O: hysterectomy  H/O cataract extraction, left  History of knee replacement    Vitals:  T(C): 36.8 (12-31-18 @ 12:15), Max: 38.2 (12-31-18 @ 08:45)  HR: 82 (12-31-18 @ 12:15) (82 - 94)  BP: 123/67 (12-31-18 @ 12:15) (107/70 - 123/67)  BP(mean): --  RR: 18 (12-31-18 @ 12:15) (16 - 18)  SpO2: 95% (12-31-18 @ 12:15) (94% - 100%)  Wt(kg): --  Daily     Daily   I&O's Summary    30 Dec 2018 07:01  -  31 Dec 2018 07:00  --------------------------------------------------------  IN: 720 mL / OUT: 1350 mL / NET: -630 mL        Physical Exam:  Appearance: appears older than stated age; bedbound, demented; no acute distress  Eyes: PERRL, EOMI, pink conjunctiva  HENT: Normal oral mucosa  Cardiovascular: RRR, S1, S2; no LE edema bilaterally; normal JVP  Respiratory: poor inspiratory effort  Gastrointestinal: soft, non-tender, non-distended with normal bowel sounds; +PEG  Musculoskeletal: Bedbound, contracted  Neurologic: cranial nerves grossly intact  Lymphatic: No lymphadenopathy  Psychiatry: awake and alert  Skin: No rashes, ecchymoses, or cyanosis                            10.2   14.03 )-----------( 315      ( 31 Dec 2018 08:10 )             31.6     12-31    142  |  104  |  60<H>  ----------------------------<  141<H>  4.4   |  24  |  0.50    Ca    10.1      31 Dec 2018 06:34        Echo: < from: Transthoracic Echocardiogram (12.03.18 @ 11:23) >  ------------------------------------------------------------------------  Conclusions:  1. Mitral annular calcification, otherwise normal mitral  valve. Moderate-severe mitral regurgitation (vena contracta  0.8 cm)  2. Calcified trileafletaortic valve with normal opening.  Peak transaortic valve gradient equals 3 mm Hg, mean  transaortic valve gradient equals 1 mm Hg, aortic valve  velocity time integral equals 13 cm. Moderate aortic  regurgitation  3. Moderately dilated left atrium.LA volume index = 45  cc/m2.  4. Severe global left ventricular systolic dysfunction.  5. Increased E/e'  is consistent with elevated left  ventricular filling pressure.  6. Moderate right atrial enlargement. Inferior vena cava is  dilated (>=2.5cm) with normal respiratory variability  consistent with right atrial pressure 16-20mm Hg.  7. Right ventricular enlargement with decreased right  ventricular systolic function.  8. Normal tricuspid valve. Severe tricuspid regurgitation.  9. Estimated pulmonary artery systolic pressure equals 82  mm Hg, assuming right atrial pressure equals 8 mm Hg,  consistent with severe pulmonary pressures.  10. Color Doppler demonstrates evidence of left to right  shunt  ------------------------------------------------------------------------  Confirmed on  12/3/2018 - 16:37:07 by Margaret Khan M.D.  ------------------------------------------------------------------------    < end of copied text >    Imaging: < from: Xray Chest 1 View- PORTABLE-Urgent (12.02.18 @ 17:19) >  IMPRESSION:   Limited study due to patient positioning.  Trace right pleural effusion and likely left pleural effusion. Question   mild pulmonary edema.    HUAN JACOBS M.D., RADIOLOGY RESIDENT  This document has been electronically signed.  TONYA AARON M.D., ATTENDING RADIOLOGIST  This document has been electronically signed. Dec  2 2018  9:17PM      < end of copied text >    < from: [facility] Duplex Lower Ext Vein Scan, Bilat (12.15.18 @ 14:01) >  IMPRESSION:     Acute, above the knee deep venous thrombosis extending from the right   common femoral vein through the popliteal vein and into the right   gastrocnemius vein.    No left lower extremity DVT.    The above findings were discussed with BENJI Ryan by Dr. Tapia on   12/15/2018 2:43 PM, with read-back verification.    YELENA TAPIA M.D., RADIOLOGY RESIDENT  This document has been electronically signed.  PRIMO MURILLO M.D., ATTENDING RADIOLOGIST  This document has been electronically signed. Dec 15 2018  3:55PM        < end of copied text >    < from: NM Pulmonary Ventilation/Perfusion Scan (12.15.18 @ 10:13) >  FINDINGS: There is heterogeneous distribution of radiopharmaceutical in   both lungs on the ventilation and perfusion images. There are no   segmental perfusion defects in either lung. No significant interval   change since the previous study of 12/3/2018.    IMPRESSION: Very low probability of pulmonary embolus.    KRYSTYNA JULIAN M.D., CHIEF OF NUCLEAR MEDICINE  This document has been electronically signed. Dec 15 2018 10:15AM    < end of copied text >    < from: CT Abdomen and Pelvis No Cont (12.22.18 @ 15:14) >  FINDINGS:    LOWER CHEST: Cardiomegaly.    LIVER: Within normal limits.  BILE DUCTS: Normal caliber.  GALLBLADDER: Cholecystectomy.  SPLEEN: Within normal limits.  PANCREAS: Within normal limits.  ADRENALS: Within normal limits.  KIDNEYS/URETERS: Within normal limits.    BLADDER: Collapsed with a Blackman catheter.  REPRODUCTIVE ORGANS: Limited evaluation secondary to extensive streak   artifact from right lower extremity hardware    BOWEL: Gastrostomy balloon within the stomach. No bowel obstruction.   Appendix is not visualized.  PERITONEUM: No ascites.  VESSELS:  Atheromatous disease of the abdominal aorta.  RETROPERITONEUM: No lymphadenopathy.    ABDOMINAL WALL: Anasarca.  BONES: Degenerative changes of the spine. Complete vertebral body height   loss of L2. Total right hip arthroplasty. Chronic bilateral rib fractures   and left acetabular fracture. Mild anterolisthesis of L4 over L5,   unchanged compared to prior exam of 8/27/2018.    IMPRESSION:    No acute intra-abdominal pathology or collection.    AMIRA GUTIERREZ M.D., RADIOLOGY RESIDENT  This document has been electronically signed.  MARKEL SIMMONS M.D., ATTENDING RADIOLOGIST  This document has been electronically signed. Dec 22 2018  4:52PM    < end of copied text >      < from: CT Chest No Cont (12.31.18 @ 10:09) >  FINDINGS:    CHEST:     LUNGS AND LARGE AIRWAYS: Patent central airways. Left basilar atelectasis.  PLEURA: No pleural effusion.  VESSELS: Atheromatous disease of thethoracic aorta.  HEART: Cardiomegaly.No pericardial effusion. Aortic valve and coronary   artery calcifications.  MEDIASTINUM AND DILLAN: No lymphadenopathy.  CHEST WALL AND LOWER NECK: Within normal limits.  VISUALIZED UPPER ABDOMEN: Cholecystectomy.  BONES: Complete vertebral body height loss of L2. Chronic bilateral rib   fractures.    IMPRESSION:   No pneumonia.    AMIRA GUTIERREZ M.D., RADIOLOGY RESIDENT  This document has been electronically signed.  CHASITY BANERJEE M.D., ATTENDING RADIOLOGIST  This document has been electronically signed. Dec 31 2018 12:10PM    < end of copied text >    Interpretation of Telemetry: Patient no longer on tele

## 2018-12-31 NOTE — PROGRESS NOTE ADULT - SUBJECTIVE AND OBJECTIVE BOX
---___---___---___---___---___---___ ---___---___---___---___---___---___---___---___---___---                    <<<  M E D I C A L   A T T E N D I N G    F O L L O W    U P   N O T E  >>>    tmax yesterday was 102.5  no source of infection found    ---___---___---___---___---___---      <<<  MEDICATIONS:  >>>    MEDICATIONS  (STANDING):  ALBUTerol/ipratropium for Nebulization 3 milliLiter(s) Nebulizer every 6 hours  apixaban 5 milliGRAM(s) Oral every 12 hours  AQUAPHOR (petrolatum Ointment) 1 Application(s) Topical three times a day  buDESOnide 160 MICROgram(s)/formoterol 4.5 MICROgram(s) Inhaler 2 Puff(s) Inhalation two times a day  clobetasol 0.05% Ointment 1 Application(s) Topical two times a day  clonazePAM Tablet 0.5 milliGRAM(s) Oral at bedtime  dextrose 5%. 1000 milliLiter(s) (50 mL/Hr) IV Continuous <Continuous>  dextrose 50% Injectable 12.5 Gram(s) IV Push once  dextrose 50% Injectable 25 Gram(s) IV Push once  dextrose 50% Injectable 25 Gram(s) IV Push once  fentaNYL   Patch  12 MICROgram(s)/Hr 1 Patch Transdermal every 72 hours  ferrous    sulfate Liquid 300 milliGRAM(s) Enteral Tube daily  gabapentin   Solution 300 milliGRAM(s) Oral two times a day  insulin lispro (HumaLOG) corrective regimen sliding scale   SubCutaneous every 6 hours  lactobacillus acidophilus 1 Tablet(s) Oral daily  lisinopril 5 milliGRAM(s) Oral daily  medical marijuana oil 1 milliLiter(s),medical marijuana oil 1 milliLiter(s),medical marijuana oil 1 milliLiter(s) 1 milliLiter(s) SubLingual <User Schedule>  minocycline 100 milliGRAM(s) Oral two times a day  mirtazapine 7.5 milliGRAM(s) Oral at bedtime  pantoprazole   Suspension 40 milliGRAM(s) Oral before breakfast  predniSONE  Solution 7.5 milliGRAM(s) Enteral Tube daily  QUEtiapine 25 milliGRAM(s) Oral at bedtime  sertraline 50 milliGRAM(s) Oral daily  sodium chloride 0.9%. 250 milliLiter(s) (25 mL/Hr) IV Continuous <Continuous>  tiZANidine 4 milliGRAM(s) Oral <User Schedule>      MEDICATIONS  (PRN):  acetaminophen    Suspension .. 680 milliGRAM(s) Oral every 6 hours PRN Temp greater or equal to 38C (100.4F), Mild Pain (1 - 3)  dextrose 40% Gel 15 Gram(s) Oral once PRN Blood Glucose LESS THAN 70 milliGRAM(s)/deciliter  glucagon  Injectable 1 milliGRAM(s) IntraMuscular once PRN Glucose LESS THAN 70 milligrams/deciliter  ipratropium    for Nebulization 500 MICROGram(s) Nebulizer every 6 hours PRN Respiratory Distress  nystatin Powder 1 Application(s) Topical two times a day PRN rash/itchiness       ---___---___---___---___---___---     <<<REVIEW OF SYSTEM: >>>    unable to obtain     ---___---___---___---___---___---          <<<  VITAL SIGNS: >>>    T(F): 100.2 (12-31-18 @ 06:39), Max: 102.5 (12-30-18 @ 10:30)  HR: 91 (12-31-18 @ 06:39) (90 - 94)  BP: 107/70 (12-31-18 @ 06:39) (107/70 - 117/63)  RR: 18 (12-31-18 @ 06:39) (16 - 18)  SpO2: 96% (12-31-18 @ 06:39) (94% - 100%)  Wt(kg): --  CAPILLARY BLOOD GLUCOSE      POCT Blood Glucose.: 162 mg/dL (31 Dec 2018 06:20)    I&O's Summary    30 Dec 2018 07:01  -  31 Dec 2018 07:00  --------------------------------------------------------  IN: 720 mL / OUT: 1350 mL / NET: -630 mL         ---___---___---___---___---___---                       PHYSICAL EXAM:    GEN: A&O X 0, NAD , comfortable  HEENT: NCAT, PERRL, MMM, no scleral icterus, hearing intact  NECK: Supple, No JVD  CVS: S1S2 , regular , No M/R/G appreciated  PULM: CTA B/L,  no W/R/R appreciated  ABD.: soft. non tender, non distended,  bowel sounds present  Extrem: intact pulses , no edema noted  Derm: No rash or ecchymosis noted sacral decubitus noted         ---___---___---___---___---___---     <<<  LAB AND IMAGING: >>>                          10.3   12.56 )-----------( 329      ( 30 Dec 2018 08:24 )             32.2               12-31    142  |  104  |  60<H>  ----------------------------<  141<H>  4.4   |  24  |  0.50    Ca    10.1      31 Dec 2018 06:34                                 [All pertinent / recent available Imaging reports and other labs reviewed]     ---___---___---___---___---___---___ ---___---___---___---___---           <<<  A S S E S S M E N T   A N D   P L A N :  >>>      fever persistant   chf   dmentia   asthma  h/o cva     will order another ct scan  of the chest   as of now all cultures have been negative   otherwise continue her current care and regimen    -GI/DVT Prophylaxis.    --------------------------------------------  Case discussed with   Education given on     >>______________________<<      Deniz Bolden .         phone   [phone]

## 2018-12-31 NOTE — PROGRESS NOTE ADULT - SUBJECTIVE AND OBJECTIVE BOX
CC: f/u for fever    Patient reports; she is still experiencing low grade fever,no change    REVIEW OF SYSTEMS:  All other review of systems negative (Comprehensive ROS)    Antimicrobials Day #  :  minocycline 100 milliGRAM(s) Oral two times a day    Other Medications Reviewed    T(F): 99.2 (12-31-18 @ 16:23), Max: 100.7 (12-31-18 @ 08:45)  HR: 82 (12-31-18 @ 12:15)  BP: 123/67 (12-31-18 @ 12:15)  RR: 18 (12-31-18 @ 12:15)  SpO2: 95% (12-31-18 @ 12:15)  Wt(kg): --    PHYSICAL EXAM:  General: poorly interactive, no acute distress  Eyes:  anicteric, no conjunctival injection, no discharge  Oropharynx: no lesions or injection 	  Neck: supple, without adenopathy  Lungs: clear to auscultation, poor inspiratory effert  Heart: regular rate and rhythm; no murmur, rubs or gallops  Abdomen: soft, nondistended, nontender, without mass or organomegaly  Skin: no lesions  Extremities: contracted  Neurologic: poorly interactive    LAB RESULTS:                        10.2   14.03 )-----------( 315      ( 31 Dec 2018 08:10 )             31.6     12-31    142  |  104  |  60<H>  ----------------------------<  141<H>  4.4   |  24  |  0.50    Ca    10.1      31 Dec 2018 06:34          MICROBIOLOGY:  RECENT CULTURES:  12-29 @ 02:43 .Blood Blood-Peripheral     No growth to date.      12-28 @ 08:59 .Urine Catheterized     >100,000 CFU/ml Yeast-like cells, presumptively not Candida albicans      12-27 @ 08:39 .Blood Blood     No growth to date.          RADIOLOGY REVIEWED:  < from: CT Chest No Cont (12.31.18 @ 10:09) >    IMPRESSION:   No pneumonia.    <

## 2019-01-01 LAB
ANION GAP SERPL CALC-SCNC: 15 MMOL/L — SIGNIFICANT CHANGE UP (ref 5–17)
BUN SERPL-MCNC: 48 MG/DL — HIGH (ref 7–23)
CALCIUM SERPL-MCNC: 9.9 MG/DL — SIGNIFICANT CHANGE UP (ref 8.4–10.5)
CHLORIDE SERPL-SCNC: 103 MMOL/L — SIGNIFICANT CHANGE UP (ref 96–108)
CO2 SERPL-SCNC: 22 MMOL/L — SIGNIFICANT CHANGE UP (ref 22–31)
CREAT SERPL-MCNC: 0.51 MG/DL — SIGNIFICANT CHANGE UP (ref 0.5–1.3)
CULTURE RESULTS: SIGNIFICANT CHANGE UP
GLUCOSE BLDC GLUCOMTR-MCNC: 113 MG/DL — HIGH (ref 70–99)
GLUCOSE BLDC GLUCOMTR-MCNC: 134 MG/DL — HIGH (ref 70–99)
GLUCOSE BLDC GLUCOMTR-MCNC: 135 MG/DL — HIGH (ref 70–99)
GLUCOSE BLDC GLUCOMTR-MCNC: 191 MG/DL — HIGH (ref 70–99)
GLUCOSE SERPL-MCNC: 112 MG/DL — HIGH (ref 70–99)
HCT VFR BLD CALC: 33.8 % — LOW (ref 34.5–45)
HGB BLD-MCNC: 10.7 G/DL — LOW (ref 11.5–15.5)
MCHC RBC-ENTMCNC: 30 PG — SIGNIFICANT CHANGE UP (ref 27–34)
MCHC RBC-ENTMCNC: 31.7 GM/DL — LOW (ref 32–36)
MCV RBC AUTO: 94.7 FL — SIGNIFICANT CHANGE UP (ref 80–100)
PLATELET # BLD AUTO: 344 K/UL — SIGNIFICANT CHANGE UP (ref 150–400)
POTASSIUM SERPL-MCNC: 4.4 MMOL/L — SIGNIFICANT CHANGE UP (ref 3.5–5.3)
POTASSIUM SERPL-SCNC: 4.4 MMOL/L — SIGNIFICANT CHANGE UP (ref 3.5–5.3)
RBC # BLD: 3.57 M/UL — LOW (ref 3.8–5.2)
RBC # FLD: 15 % — HIGH (ref 10.3–14.5)
SODIUM SERPL-SCNC: 140 MMOL/L — SIGNIFICANT CHANGE UP (ref 135–145)
SPECIMEN SOURCE: SIGNIFICANT CHANGE UP
TSH SERPL-MCNC: 3.81 UIU/ML — SIGNIFICANT CHANGE UP (ref 0.27–4.2)
WBC # BLD: 16.13 K/UL — HIGH (ref 3.8–10.5)
WBC # FLD AUTO: 16.13 K/UL — HIGH (ref 3.8–10.5)

## 2019-01-01 RX ORDER — SERTRALINE 25 MG/1
25 TABLET, FILM COATED ORAL DAILY
Qty: 0 | Refills: 0 | Status: DISCONTINUED | OUTPATIENT
Start: 2019-01-01 | End: 2019-01-07

## 2019-01-01 RX ADMIN — MINOCYCLINE HYDROCHLORIDE 100 MILLIGRAM(S): 45 TABLET, EXTENDED RELEASE ORAL at 06:25

## 2019-01-01 RX ADMIN — Medication 1 APPLICATION(S): at 18:02

## 2019-01-01 RX ADMIN — Medication 3 MILLILITER(S): at 17:36

## 2019-01-01 RX ADMIN — Medication 1 APPLICATION(S): at 21:44

## 2019-01-01 RX ADMIN — MINOCYCLINE HYDROCHLORIDE 100 MILLIGRAM(S): 45 TABLET, EXTENDED RELEASE ORAL at 18:03

## 2019-01-01 RX ADMIN — FENTANYL CITRATE 1 PATCH: 50 INJECTION INTRAVENOUS at 07:35

## 2019-01-01 RX ADMIN — BUDESONIDE AND FORMOTEROL FUMARATE DIHYDRATE 2 PUFF(S): 160; 4.5 AEROSOL RESPIRATORY (INHALATION) at 21:34

## 2019-01-01 RX ADMIN — FENTANYL CITRATE 1 PATCH: 50 INJECTION INTRAVENOUS at 19:30

## 2019-01-01 RX ADMIN — Medication 0.5 MILLIGRAM(S): at 21:35

## 2019-01-01 RX ADMIN — QUETIAPINE FUMARATE 25 MILLIGRAM(S): 200 TABLET, FILM COATED ORAL at 21:35

## 2019-01-01 RX ADMIN — APIXABAN 5 MILLIGRAM(S): 2.5 TABLET, FILM COATED ORAL at 17:37

## 2019-01-01 RX ADMIN — BUDESONIDE AND FORMOTEROL FUMARATE DIHYDRATE 2 PUFF(S): 160; 4.5 AEROSOL RESPIRATORY (INHALATION) at 10:33

## 2019-01-01 RX ADMIN — Medication 1: at 12:54

## 2019-01-01 RX ADMIN — Medication 3 MILLILITER(S): at 06:24

## 2019-01-01 RX ADMIN — SERTRALINE 25 MILLIGRAM(S): 25 TABLET, FILM COATED ORAL at 12:54

## 2019-01-01 RX ADMIN — Medication 300 MILLIGRAM(S): at 12:56

## 2019-01-01 RX ADMIN — GABAPENTIN 300 MILLIGRAM(S): 400 CAPSULE ORAL at 17:59

## 2019-01-01 RX ADMIN — TIZANIDINE 4 MILLIGRAM(S): 4 TABLET ORAL at 09:18

## 2019-01-01 RX ADMIN — Medication 7.5 MILLIGRAM(S): at 06:26

## 2019-01-01 RX ADMIN — Medication 3 MILLILITER(S): at 12:54

## 2019-01-01 RX ADMIN — Medication 1 TABLET(S): at 12:53

## 2019-01-01 RX ADMIN — TIZANIDINE 4 MILLIGRAM(S): 4 TABLET ORAL at 21:34

## 2019-01-01 RX ADMIN — APIXABAN 5 MILLIGRAM(S): 2.5 TABLET, FILM COATED ORAL at 06:26

## 2019-01-01 RX ADMIN — Medication 1 APPLICATION(S): at 11:01

## 2019-01-01 RX ADMIN — GABAPENTIN 300 MILLIGRAM(S): 400 CAPSULE ORAL at 06:27

## 2019-01-01 RX ADMIN — Medication 680 MILLIGRAM(S): at 06:25

## 2019-01-01 RX ADMIN — PANTOPRAZOLE SODIUM 40 MILLIGRAM(S): 20 TABLET, DELAYED RELEASE ORAL at 06:25

## 2019-01-01 RX ADMIN — LISINOPRIL 5 MILLIGRAM(S): 2.5 TABLET ORAL at 06:26

## 2019-01-01 RX ADMIN — MIRTAZAPINE 7.5 MILLIGRAM(S): 45 TABLET, ORALLY DISINTEGRATING ORAL at 21:35

## 2019-01-01 NOTE — PROGRESS NOTE ADULT - ASSESSMENT
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  The differential of fever includes infection,malignancy as well as inflammatory fever.  Known DVT in leg.  No clear source of infection  Recent CT of C/A/P  with no defined infection and recent   cultures and CDT remain negative.  Will consider antifungal therapy but with no  obstruction it is uncommon to see systemic candidal infections from the urine  Now on different steroid supplement,so far no impact on fever curve  Her overall course is largely unchanged.  appreciate pulmonary f/u, ? microaspiration,consider bolus feeds.  CT scan of chest 12/31 is negative  Her sacral decubitis does not appear infected  Plan:   continue supportive care as per primary medical team   All blood cultures have been negative  My bias would be to support her medically and try to keep comfortable  No indications for additional antibiotics other than suppressive minocycline  Will continue to look for infection but she seems to be tolerating 1 month of fever well.  Leukocytosis remains unexplained but appears to wax and wane  I would favor a hospice disposition but I do not think family is ready for this    [place] guarded

## 2019-01-01 NOTE — PROGRESS NOTE ADULT - SUBJECTIVE AND OBJECTIVE BOX
CC: f/u for fever    Patient reports: she is non verbal,  at bedside    REVIEW OF SYSTEMS:  All other review of systems negative (Comprehensive ROS): tolerating peg feeds,no respiratory complaints    Antimicrobials Day #  :  minocycline 100 milliGRAM(s) Oral two times a day    Other Medications Reviewed    T(F): 100 (01-01-19 @ 10:00), Max: 101.2 (01-01-19 @ 05:51)  HR: 85 (01-01-19 @ 10:00)  BP: 107/68 (01-01-19 @ 10:00)  RR: 16 (01-01-19 @ 10:00)  SpO2: 95% (01-01-19 @ 10:00)  Wt(kg): --    PHYSICAL EXAM:  General: awake no acute distress  Eyes:  anicteric, no conjunctival injection, no discharge  Oropharynx: no lesions or injection 	  Neck: supple, without adenopathy  Lungs: diminished at bases  Heart: regular rate and rhythm; no murmur, rubs or gallops  Abdomen: soft, nondistended, nontender, without mass or organomegaly  Skin: sacral area examined, superficial skin slough, no odor or purulence  Extremities: contacted  Neurologic: awake, poorly interactive    LAB RESULTS:                        10.7   16.13 )-----------( 344      ( 01 Jan 2019 09:18 )             33.8     01-01    140  |  103  |  48<H>  ----------------------------<  112<H>  4.4   |  22  |  0.51    Ca    9.9      01 Jan 2019 05:57          MICROBIOLOGY:  RECENT CULTURES:  12-29 @ 02:43 .Blood Blood-Peripheral     No growth to date.      12-28 @ 08:59 .Urine Catheterized     >100,000 CFU/ml Yeast-like cells, presumptively not Candida albicans          RADIOLOGY REVIEWED:    < from: CT Chest No Cont (12.31.18 @ 10:09) >  IMPRESSION:   No pneumonia.    < end of copied text >

## 2019-01-01 NOTE — PROGRESS NOTE ADULT - SUBJECTIVE AND OBJECTIVE BOX
---___---___---___---___---___---___ ---___---___---___---___---___---___---___---___---___---                    <<<  M E D I C A L   A T T E N D I N G    F O L L O W    U P   N O T E  >>>  still with fevers consults appreciated . source not found     ---___---___---___---___---___---      <<<  MEDICATIONS:  >>>    MEDICATIONS  (STANDING):  ALBUTerol/ipratropium for Nebulization 3 milliLiter(s) Nebulizer every 6 hours  apixaban 5 milliGRAM(s) Oral every 12 hours  AQUAPHOR (petrolatum Ointment) 1 Application(s) Topical three times a day  buDESOnide 160 MICROgram(s)/formoterol 4.5 MICROgram(s) Inhaler 2 Puff(s) Inhalation two times a day  clobetasol 0.05% Ointment 1 Application(s) Topical two times a day  clonazePAM Tablet 0.5 milliGRAM(s) Oral at bedtime  dextrose 5%. 1000 milliLiter(s) (50 mL/Hr) IV Continuous <Continuous>  dextrose 50% Injectable 12.5 Gram(s) IV Push once  dextrose 50% Injectable 25 Gram(s) IV Push once  dextrose 50% Injectable 25 Gram(s) IV Push once  fentaNYL   Patch  12 MICROgram(s)/Hr 1 Patch Transdermal every 72 hours  ferrous    sulfate Liquid 300 milliGRAM(s) Enteral Tube daily  gabapentin   Solution 300 milliGRAM(s) Oral two times a day  insulin lispro (HumaLOG) corrective regimen sliding scale   SubCutaneous every 6 hours  lactobacillus acidophilus 1 Tablet(s) Oral daily  lisinopril 5 milliGRAM(s) Oral daily  medical marijuana oil 1 milliLiter(s),medical marijuana oil 1 milliLiter(s),medical marijuana oil 1 milliLiter(s) 1 milliLiter(s) SubLingual <User Schedule>  minocycline 100 milliGRAM(s) Oral two times a day  mirtazapine 7.5 milliGRAM(s) Oral at bedtime  pantoprazole   Suspension 40 milliGRAM(s) Oral before breakfast  predniSONE  Solution 7.5 milliGRAM(s) Enteral Tube daily  QUEtiapine 25 milliGRAM(s) Oral at bedtime  sertraline 50 milliGRAM(s) Oral daily  sodium chloride 0.9%. 250 milliLiter(s) (25 mL/Hr) IV Continuous <Continuous>  tiZANidine 4 milliGRAM(s) Oral <User Schedule>      MEDICATIONS  (PRN):  acetaminophen    Suspension .. 680 milliGRAM(s) Oral every 6 hours PRN Temp greater or equal to 38C (100.4F), Mild Pain (1 - 3)  dextrose 40% Gel 15 Gram(s) Oral once PRN Blood Glucose LESS THAN 70 milliGRAM(s)/deciliter  glucagon  Injectable 1 milliGRAM(s) IntraMuscular once PRN Glucose LESS THAN 70 milligrams/deciliter  ipratropium    for Nebulization 500 MICROGram(s) Nebulizer every 6 hours PRN Respiratory Distress  nystatin Powder 1 Application(s) Topical two times a day PRN rash/itchiness       ---___---___---___---___---___---     <<<REVIEW OF SYSTEM: >>>    unable to obtain     ---___---___---___---___---___---          <<<  VITAL SIGNS: >>>    T(F): 100 (01-01-19 @ 10:00), Max: 101.2 (01-01-19 @ 05:51)  HR: 85 (01-01-19 @ 10:00) (82 - 96)  BP: 107/68 (01-01-19 @ 10:00) (107/68 - 129/76)  RR: 16 (01-01-19 @ 10:00) (16 - 18)  SpO2: 95% (01-01-19 @ 10:00) (94% - 95%)  Wt(kg): --  CAPILLARY BLOOD GLUCOSE      POCT Blood Glucose.: 113 mg/dL (01 Jan 2019 05:46)    I&O's Summary    31 Dec 2018 07:01  -  01 Jan 2019 07:00  --------------------------------------------------------  IN: 0 mL / OUT: 1400 mL / NET: -1400 mL    01 Jan 2019 07:01  -  01 Jan 2019 11:06  --------------------------------------------------------  IN: 0 mL / OUT: 0 mL / NET: 0 mL         ---___---___---___---___---___---                       PHYSICAL EXAM:    GEN: A&O X 1, NAD , comfortable  HEENT: NCAT, PERRL, MMM, no scleral icterus, hearing intact  NECK: Supple, No JVD  CVS: S1S2 , regular , No M/R/G appreciated  PULM: CTA B/L,  no W/R/R appreciated  ABD.: soft. non tender, non distended,  bowel sounds present peg feeds noted   Extrem: intact pulses , no edema noted  Derm: No rash or ecchymosis noted        ---___---___---___---___---___---     <<<  LAB AND IMAGING: >>>                          10.7   16.13 )-----------( 344      ( 01 Jan 2019 09:18 )             33.8               01-01    140  |  103  |  48<H>  ----------------------------<  112<H>  4.4   |  22  |  0.51    Ca    9.9      01 Jan 2019 05:57                                 [All pertinent / recent available Imaging reports and other labs reviewed]     ---___---___---___---___---___---___ ---___---___---___---___---           <<<  A S S E S S M E N T   A N D   P L A N :  >>>          -GI/DVT Prophylaxis.    --------------------------------------------  Case discussed with   Education given on     >>______________________<<      Deniz Bolden .         phone   [phone]

## 2019-01-02 LAB
ANION GAP SERPL CALC-SCNC: 16 MMOL/L — SIGNIFICANT CHANGE UP (ref 5–17)
BUN SERPL-MCNC: 48 MG/DL — HIGH (ref 7–23)
CALCIUM SERPL-MCNC: 9.8 MG/DL — SIGNIFICANT CHANGE UP (ref 8.4–10.5)
CHLORIDE SERPL-SCNC: 101 MMOL/L — SIGNIFICANT CHANGE UP (ref 96–108)
CO2 SERPL-SCNC: 23 MMOL/L — SIGNIFICANT CHANGE UP (ref 22–31)
CREAT SERPL-MCNC: 0.49 MG/DL — LOW (ref 0.5–1.3)
GLUCOSE BLDC GLUCOMTR-MCNC: 139 MG/DL — HIGH (ref 70–99)
GLUCOSE BLDC GLUCOMTR-MCNC: 142 MG/DL — HIGH (ref 70–99)
GLUCOSE BLDC GLUCOMTR-MCNC: 173 MG/DL — HIGH (ref 70–99)
GLUCOSE BLDC GLUCOMTR-MCNC: 175 MG/DL — HIGH (ref 70–99)
GLUCOSE SERPL-MCNC: 143 MG/DL — HIGH (ref 70–99)
HCT VFR BLD CALC: 31.2 % — LOW (ref 34.5–45)
HGB BLD-MCNC: 10.4 G/DL — LOW (ref 11.5–15.5)
MCHC RBC-ENTMCNC: 31 PG — SIGNIFICANT CHANGE UP (ref 27–34)
MCHC RBC-ENTMCNC: 33.3 GM/DL — SIGNIFICANT CHANGE UP (ref 32–36)
MCV RBC AUTO: 93.1 FL — SIGNIFICANT CHANGE UP (ref 80–100)
PLATELET # BLD AUTO: 289 K/UL — SIGNIFICANT CHANGE UP (ref 150–400)
POTASSIUM SERPL-MCNC: 4 MMOL/L — SIGNIFICANT CHANGE UP (ref 3.5–5.3)
POTASSIUM SERPL-SCNC: 4 MMOL/L — SIGNIFICANT CHANGE UP (ref 3.5–5.3)
RBC # BLD: 3.35 M/UL — LOW (ref 3.8–5.2)
RBC # FLD: 15.3 % — HIGH (ref 10.3–14.5)
SODIUM SERPL-SCNC: 140 MMOL/L — SIGNIFICANT CHANGE UP (ref 135–145)
WBC # BLD: 14.83 K/UL — HIGH (ref 3.8–10.5)
WBC # FLD AUTO: 14.83 K/UL — HIGH (ref 3.8–10.5)

## 2019-01-02 RX ADMIN — MINOCYCLINE HYDROCHLORIDE 100 MILLIGRAM(S): 45 TABLET, EXTENDED RELEASE ORAL at 18:02

## 2019-01-02 RX ADMIN — APIXABAN 5 MILLIGRAM(S): 2.5 TABLET, FILM COATED ORAL at 06:13

## 2019-01-02 RX ADMIN — Medication 1 TABLET(S): at 09:45

## 2019-01-02 RX ADMIN — BUDESONIDE AND FORMOTEROL FUMARATE DIHYDRATE 2 PUFF(S): 160; 4.5 AEROSOL RESPIRATORY (INHALATION) at 08:22

## 2019-01-02 RX ADMIN — LISINOPRIL 5 MILLIGRAM(S): 2.5 TABLET ORAL at 06:14

## 2019-01-02 RX ADMIN — Medication 1 APPLICATION(S): at 13:32

## 2019-01-02 RX ADMIN — Medication 1 APPLICATION(S): at 22:20

## 2019-01-02 RX ADMIN — PANTOPRAZOLE SODIUM 40 MILLIGRAM(S): 20 TABLET, DELAYED RELEASE ORAL at 06:14

## 2019-01-02 RX ADMIN — Medication 1: at 12:44

## 2019-01-02 RX ADMIN — Medication 3 MILLILITER(S): at 18:02

## 2019-01-02 RX ADMIN — BUDESONIDE AND FORMOTEROL FUMARATE DIHYDRATE 2 PUFF(S): 160; 4.5 AEROSOL RESPIRATORY (INHALATION) at 22:20

## 2019-01-02 RX ADMIN — FENTANYL CITRATE 1 PATCH: 50 INJECTION INTRAVENOUS at 08:22

## 2019-01-02 RX ADMIN — Medication 680 MILLIGRAM(S): at 23:39

## 2019-01-02 RX ADMIN — QUETIAPINE FUMARATE 25 MILLIGRAM(S): 200 TABLET, FILM COATED ORAL at 22:19

## 2019-01-02 RX ADMIN — Medication 0.5 MILLIGRAM(S): at 22:20

## 2019-01-02 RX ADMIN — MINOCYCLINE HYDROCHLORIDE 100 MILLIGRAM(S): 45 TABLET, EXTENDED RELEASE ORAL at 06:13

## 2019-01-02 RX ADMIN — SERTRALINE 25 MILLIGRAM(S): 25 TABLET, FILM COATED ORAL at 09:45

## 2019-01-02 RX ADMIN — APIXABAN 5 MILLIGRAM(S): 2.5 TABLET, FILM COATED ORAL at 18:01

## 2019-01-02 RX ADMIN — Medication 3 MILLILITER(S): at 12:44

## 2019-01-02 RX ADMIN — MIRTAZAPINE 7.5 MILLIGRAM(S): 45 TABLET, ORALLY DISINTEGRATING ORAL at 22:19

## 2019-01-02 RX ADMIN — FENTANYL CITRATE 1 PATCH: 50 INJECTION INTRAVENOUS at 22:18

## 2019-01-02 RX ADMIN — TIZANIDINE 4 MILLIGRAM(S): 4 TABLET ORAL at 09:45

## 2019-01-02 RX ADMIN — Medication 7.5 MILLIGRAM(S): at 06:14

## 2019-01-02 RX ADMIN — FENTANYL CITRATE 1 PATCH: 50 INJECTION INTRAVENOUS at 22:19

## 2019-01-02 RX ADMIN — Medication 3 MILLILITER(S): at 06:12

## 2019-01-02 RX ADMIN — Medication 680 MILLIGRAM(S): at 22:18

## 2019-01-02 RX ADMIN — Medication 300 MILLIGRAM(S): at 09:45

## 2019-01-02 RX ADMIN — GABAPENTIN 300 MILLIGRAM(S): 400 CAPSULE ORAL at 18:01

## 2019-01-02 RX ADMIN — TIZANIDINE 4 MILLIGRAM(S): 4 TABLET ORAL at 21:03

## 2019-01-02 RX ADMIN — Medication 1: at 08:21

## 2019-01-02 RX ADMIN — FENTANYL CITRATE 1 PATCH: 50 INJECTION INTRAVENOUS at 19:08

## 2019-01-02 RX ADMIN — GABAPENTIN 300 MILLIGRAM(S): 400 CAPSULE ORAL at 06:13

## 2019-01-02 RX ADMIN — Medication 1 APPLICATION(S): at 06:51

## 2019-01-02 NOTE — PROGRESS NOTE ADULT - SUBJECTIVE AND OBJECTIVE BOX
---___---___---___---___---___---___ ---___---___---___---___---___---___---___---___---___---                    <<<  M E D I C A L   A T T E N D I N G    F O L L O W    U P   N O T E  >>>    -need to be fever free.  x 24 hours before dc . seems to have fever at night      ---___---___---___---___---___---      <<<  MEDICATIONS:  >>>    MEDICATIONS  (STANDING):  ALBUTerol/ipratropium for Nebulization 3 milliLiter(s) Nebulizer every 6 hours  apixaban 5 milliGRAM(s) Oral every 12 hours  AQUAPHOR (petrolatum Ointment) 1 Application(s) Topical three times a day  buDESOnide 160 MICROgram(s)/formoterol 4.5 MICROgram(s) Inhaler 2 Puff(s) Inhalation two times a day  clobetasol 0.05% Ointment 1 Application(s) Topical two times a day  clonazePAM Tablet 0.5 milliGRAM(s) Oral at bedtime  dextrose 5%. 1000 milliLiter(s) (50 mL/Hr) IV Continuous <Continuous>  dextrose 50% Injectable 12.5 Gram(s) IV Push once  dextrose 50% Injectable 25 Gram(s) IV Push once  dextrose 50% Injectable 25 Gram(s) IV Push once  fentaNYL   Patch  12 MICROgram(s)/Hr 1 Patch Transdermal every 72 hours  ferrous    sulfate Liquid 300 milliGRAM(s) Enteral Tube daily  gabapentin   Solution 300 milliGRAM(s) Oral two times a day  insulin lispro (HumaLOG) corrective regimen sliding scale   SubCutaneous every 6 hours  lactobacillus acidophilus 1 Tablet(s) Oral daily  lisinopril 5 milliGRAM(s) Oral daily  medical marijuana oil 1 milliLiter(s),medical marijuana oil 1 milliLiter(s),medical marijuana oil 1 milliLiter(s) 1 milliLiter(s) SubLingual <User Schedule>  minocycline 100 milliGRAM(s) Oral two times a day  mirtazapine 7.5 milliGRAM(s) Oral at bedtime  pantoprazole   Suspension 40 milliGRAM(s) Oral before breakfast  predniSONE  Solution 7.5 milliGRAM(s) Enteral Tube daily  QUEtiapine 25 milliGRAM(s) Oral at bedtime  sertraline 25 milliGRAM(s) Oral daily  sodium chloride 0.9%. 250 milliLiter(s) (25 mL/Hr) IV Continuous <Continuous>  tiZANidine 4 milliGRAM(s) Oral <User Schedule>      MEDICATIONS  (PRN):  acetaminophen    Suspension .. 680 milliGRAM(s) Oral every 6 hours PRN Temp greater or equal to 38C (100.4F), Mild Pain (1 - 3)  dextrose 40% Gel 15 Gram(s) Oral once PRN Blood Glucose LESS THAN 70 milliGRAM(s)/deciliter  glucagon  Injectable 1 milliGRAM(s) IntraMuscular once PRN Glucose LESS THAN 70 milligrams/deciliter  ipratropium    for Nebulization 500 MICROGram(s) Nebulizer every 6 hours PRN Respiratory Distress  nystatin Powder 1 Application(s) Topical two times a day PRN rash/itchiness       ---___---___---___---___---___---     <<<REVIEW OF SYSTEM: >>>    unable to obtain      ---___---___---___---___---___---          <<<  VITAL SIGNS: >>>    T(F): 99.4 (01-02-19 @ 13:32), Max: 99.4 (01-02-19 @ 13:32)  HR: 89 (01-02-19 @ 13:32) (71 - 101)  BP: 108/70 (01-02-19 @ 13:32) (100/62 - 138/71)  RR: 18 (01-02-19 @ 13:32) (18 - 18)  SpO2: 94% (01-02-19 @ 13:32) (93% - 97%)  Wt(kg): --  CAPILLARY BLOOD GLUCOSE      POCT Blood Glucose.: 175 mg/dL (02 Jan 2019 12:23)    I&O's Summary    01 Jan 2019 07:01  -  02 Jan 2019 07:00  --------------------------------------------------------  IN: 750 mL / OUT: 750 mL / NET: 0 mL    02 Jan 2019 07:01  -  02 Jan 2019 16:09  --------------------------------------------------------  IN: 810 mL / OUT: 0 mL / NET: 810 mL         ---___---___---___---___---___---                       PHYSICAL EXAM:    GEN: A&O X 0 , NAD , comfortable  HEENT: NCAT, PERRL, MMM, no scleral icterus, hearing intact  NECK: Supple, No JVD  CVS: S1S2 , regular , No M/R/G appreciated  PULM: CTA B/L,  no W/R/R appreciated  ABD.: soft. non tender, non distended,  bowel sounds present peg noted   Extrem: intact pulses , no edema noted contractures noted          ---___---___---___---___---___---     <<<  LAB AND IMAGING: >>>                          10.4   14.83 )-----------( 289      ( 02 Jan 2019 06:50 )             31.2               01-02    140  |  101  |  48<H>  ----------------------------<  143<H>  4.0   |  23  |  0.49<L>    Ca    9.8      02 Jan 2019 06:26                                 [All pertinent / recent available Imaging reports and other labs reviewed]     ---___---___---___---___---___---___ ---___---___---___---___---           <<<  A S S E S S M E N T   A N D   P L A N :  >>>          -GI/DVT Prophylaxis.    --------------------------------------------  Case discussed with   Education given on     >>______________________<<      Deniz Bolden .         phone   [phone]

## 2019-01-02 NOTE — PROGRESS NOTE ADULT - ASSESSMENT
chf   dvt   anxiety   depression   continue zoloft taper and   and continue remeron   klonopin and Seroquel   medical debby pena chronic pain

## 2019-01-03 LAB
-  AMIKACIN: SIGNIFICANT CHANGE UP
-  AMOXICILLIN/CLAVULANIC ACID: SIGNIFICANT CHANGE UP
-  AMPICILLIN/SULBACTAM: SIGNIFICANT CHANGE UP
-  AMPICILLIN: SIGNIFICANT CHANGE UP
-  AMPICILLIN: SIGNIFICANT CHANGE UP
-  AZTREONAM: SIGNIFICANT CHANGE UP
-  CEFAZOLIN: SIGNIFICANT CHANGE UP
-  CEFEPIME: SIGNIFICANT CHANGE UP
-  CEFOXITIN: SIGNIFICANT CHANGE UP
-  CEFTRIAXONE: SIGNIFICANT CHANGE UP
-  CIPROFLOXACIN: SIGNIFICANT CHANGE UP
-  ERTAPENEM: SIGNIFICANT CHANGE UP
-  GENTAMICIN: SIGNIFICANT CHANGE UP
-  IMIPENEM: SIGNIFICANT CHANGE UP
-  LEVOFLOXACIN: SIGNIFICANT CHANGE UP
-  MEROPENEM: SIGNIFICANT CHANGE UP
-  PIPERACILLIN/TAZOBACTAM: SIGNIFICANT CHANGE UP
-  TETRACYCLINE: SIGNIFICANT CHANGE UP
-  TOBRAMYCIN: SIGNIFICANT CHANGE UP
-  TRIMETHOPRIM/SULFAMETHOXAZOLE: SIGNIFICANT CHANGE UP
-  VANCOMYCIN: SIGNIFICANT CHANGE UP
ANION GAP SERPL CALC-SCNC: 12 MMOL/L — SIGNIFICANT CHANGE UP (ref 5–17)
BASOPHILS # BLD AUTO: 0.03 K/UL — SIGNIFICANT CHANGE UP (ref 0–0.2)
BASOPHILS NFR BLD AUTO: 0.2 % — SIGNIFICANT CHANGE UP (ref 0–2)
BUN SERPL-MCNC: 52 MG/DL — HIGH (ref 7–23)
CALCIUM SERPL-MCNC: 9.7 MG/DL — SIGNIFICANT CHANGE UP (ref 8.4–10.5)
CHLORIDE SERPL-SCNC: 98 MMOL/L — SIGNIFICANT CHANGE UP (ref 96–108)
CO2 SERPL-SCNC: 22 MMOL/L — SIGNIFICANT CHANGE UP (ref 22–31)
CREAT SERPL-MCNC: 0.56 MG/DL — SIGNIFICANT CHANGE UP (ref 0.5–1.3)
CULTURE RESULTS: SIGNIFICANT CHANGE UP
EOSINOPHIL # BLD AUTO: 0.96 K/UL — HIGH (ref 0–0.5)
EOSINOPHIL NFR BLD AUTO: 6.6 % — HIGH (ref 0–6)
GLUCOSE BLDC GLUCOMTR-MCNC: 125 MG/DL — HIGH (ref 70–99)
GLUCOSE BLDC GLUCOMTR-MCNC: 139 MG/DL — HIGH (ref 70–99)
GLUCOSE BLDC GLUCOMTR-MCNC: 144 MG/DL — HIGH (ref 70–99)
GLUCOSE BLDC GLUCOMTR-MCNC: 163 MG/DL — HIGH (ref 70–99)
GLUCOSE BLDC GLUCOMTR-MCNC: 206 MG/DL — HIGH (ref 70–99)
GLUCOSE SERPL-MCNC: 160 MG/DL — HIGH (ref 70–99)
HCT VFR BLD CALC: 30.2 % — LOW (ref 34.5–45)
HGB BLD-MCNC: 10 G/DL — LOW (ref 11.5–15.5)
IMM GRANULOCYTES NFR BLD AUTO: 0.8 % — SIGNIFICANT CHANGE UP (ref 0–1.5)
LYMPHOCYTES # BLD AUTO: 1.52 K/UL — SIGNIFICANT CHANGE UP (ref 1–3.3)
LYMPHOCYTES # BLD AUTO: 10.4 % — LOW (ref 13–44)
MCHC RBC-ENTMCNC: 30.1 PG — SIGNIFICANT CHANGE UP (ref 27–34)
MCHC RBC-ENTMCNC: 33.1 GM/DL — SIGNIFICANT CHANGE UP (ref 32–36)
MCV RBC AUTO: 91 FL — SIGNIFICANT CHANGE UP (ref 80–100)
METHOD TYPE: SIGNIFICANT CHANGE UP
METHOD TYPE: SIGNIFICANT CHANGE UP
MONOCYTES # BLD AUTO: 1.26 K/UL — HIGH (ref 0–0.9)
MONOCYTES NFR BLD AUTO: 8.6 % — SIGNIFICANT CHANGE UP (ref 2–14)
NEUTROPHILS # BLD AUTO: 10.75 K/UL — HIGH (ref 1.8–7.4)
NEUTROPHILS NFR BLD AUTO: 73.4 % — SIGNIFICANT CHANGE UP (ref 43–77)
PLATELET # BLD AUTO: 323 K/UL — SIGNIFICANT CHANGE UP (ref 150–400)
POTASSIUM SERPL-MCNC: 4.1 MMOL/L — SIGNIFICANT CHANGE UP (ref 3.5–5.3)
POTASSIUM SERPL-SCNC: 4.1 MMOL/L — SIGNIFICANT CHANGE UP (ref 3.5–5.3)
RBC # BLD: 3.32 M/UL — LOW (ref 3.8–5.2)
RBC # FLD: 15.3 % — HIGH (ref 10.3–14.5)
SODIUM SERPL-SCNC: 132 MMOL/L — LOW (ref 135–145)
SPECIMEN SOURCE: SIGNIFICANT CHANGE UP
WBC # BLD: 14.63 K/UL — HIGH (ref 3.8–10.5)
WBC # FLD AUTO: 14.63 K/UL — HIGH (ref 3.8–10.5)

## 2019-01-03 PROCEDURE — 93971 EXTREMITY STUDY: CPT | Mod: 26

## 2019-01-03 RX ADMIN — TIZANIDINE 4 MILLIGRAM(S): 4 TABLET ORAL at 20:56

## 2019-01-03 RX ADMIN — Medication 1 TABLET(S): at 12:45

## 2019-01-03 RX ADMIN — Medication 1 APPLICATION(S): at 06:26

## 2019-01-03 RX ADMIN — MIRTAZAPINE 7.5 MILLIGRAM(S): 45 TABLET, ORALLY DISINTEGRATING ORAL at 22:18

## 2019-01-03 RX ADMIN — FENTANYL CITRATE 1 PATCH: 50 INJECTION INTRAVENOUS at 07:30

## 2019-01-03 RX ADMIN — FENTANYL CITRATE 1 PATCH: 50 INJECTION INTRAVENOUS at 22:17

## 2019-01-03 RX ADMIN — Medication 300 MILLIGRAM(S): at 12:45

## 2019-01-03 RX ADMIN — Medication 1 APPLICATION(S): at 17:01

## 2019-01-03 RX ADMIN — Medication 3 MILLILITER(S): at 12:44

## 2019-01-03 RX ADMIN — BUDESONIDE AND FORMOTEROL FUMARATE DIHYDRATE 2 PUFF(S): 160; 4.5 AEROSOL RESPIRATORY (INHALATION) at 08:40

## 2019-01-03 RX ADMIN — BUDESONIDE AND FORMOTEROL FUMARATE DIHYDRATE 2 PUFF(S): 160; 4.5 AEROSOL RESPIRATORY (INHALATION) at 23:06

## 2019-01-03 RX ADMIN — PANTOPRAZOLE SODIUM 40 MILLIGRAM(S): 20 TABLET, DELAYED RELEASE ORAL at 06:27

## 2019-01-03 RX ADMIN — TIZANIDINE 4 MILLIGRAM(S): 4 TABLET ORAL at 08:40

## 2019-01-03 RX ADMIN — SERTRALINE 25 MILLIGRAM(S): 25 TABLET, FILM COATED ORAL at 12:45

## 2019-01-03 RX ADMIN — APIXABAN 5 MILLIGRAM(S): 2.5 TABLET, FILM COATED ORAL at 06:26

## 2019-01-03 RX ADMIN — Medication 2: at 12:44

## 2019-01-03 RX ADMIN — LISINOPRIL 5 MILLIGRAM(S): 2.5 TABLET ORAL at 06:26

## 2019-01-03 RX ADMIN — Medication 1 APPLICATION(S): at 13:01

## 2019-01-03 RX ADMIN — Medication 7.5 MILLIGRAM(S): at 06:26

## 2019-01-03 RX ADMIN — Medication 3 MILLILITER(S): at 17:00

## 2019-01-03 RX ADMIN — Medication 680 MILLIGRAM(S): at 23:25

## 2019-01-03 RX ADMIN — Medication 1: at 06:28

## 2019-01-03 RX ADMIN — QUETIAPINE FUMARATE 25 MILLIGRAM(S): 200 TABLET, FILM COATED ORAL at 22:18

## 2019-01-03 RX ADMIN — GABAPENTIN 300 MILLIGRAM(S): 400 CAPSULE ORAL at 06:26

## 2019-01-03 RX ADMIN — Medication 3 MILLILITER(S): at 06:27

## 2019-01-03 RX ADMIN — MINOCYCLINE HYDROCHLORIDE 100 MILLIGRAM(S): 45 TABLET, EXTENDED RELEASE ORAL at 17:01

## 2019-01-03 RX ADMIN — APIXABAN 5 MILLIGRAM(S): 2.5 TABLET, FILM COATED ORAL at 17:01

## 2019-01-03 RX ADMIN — GABAPENTIN 300 MILLIGRAM(S): 400 CAPSULE ORAL at 17:02

## 2019-01-03 RX ADMIN — MINOCYCLINE HYDROCHLORIDE 100 MILLIGRAM(S): 45 TABLET, EXTENDED RELEASE ORAL at 06:26

## 2019-01-03 NOTE — PROGRESS NOTE ADULT - SUBJECTIVE AND OBJECTIVE BOX
---___---___---___---___---___---___ ---___---___---___---___---___---___---___---___---___---                    <<<  M E D I C A L   A T T E N D I N G    F O L L O W    U P   N O T E  >>>    fever 100.7 more awake and alert now but still spiking temps . hematology called,  usd to evaluate the dvt    ---___---___---___---___---___---      <<<  MEDICATIONS:  >>>    MEDICATIONS  (STANDING):  ALBUTerol/ipratropium for Nebulization 3 milliLiter(s) Nebulizer every 6 hours  apixaban 5 milliGRAM(s) Oral every 12 hours  AQUAPHOR (petrolatum Ointment) 1 Application(s) Topical three times a day  buDESOnide 160 MICROgram(s)/formoterol 4.5 MICROgram(s) Inhaler 2 Puff(s) Inhalation two times a day  clobetasol 0.05% Ointment 1 Application(s) Topical two times a day  dextrose 5%. 1000 milliLiter(s) (50 mL/Hr) IV Continuous <Continuous>  dextrose 50% Injectable 12.5 Gram(s) IV Push once  dextrose 50% Injectable 25 Gram(s) IV Push once  dextrose 50% Injectable 25 Gram(s) IV Push once  ferrous    sulfate Liquid 300 milliGRAM(s) Enteral Tube daily  gabapentin   Solution 300 milliGRAM(s) Oral two times a day  insulin lispro (HumaLOG) corrective regimen sliding scale   SubCutaneous every 6 hours  lactobacillus acidophilus 1 Tablet(s) Oral daily  lisinopril 5 milliGRAM(s) Oral daily  medical marijuana oil 1 milliLiter(s),medical marijuana oil 1 milliLiter(s),medical marijuana oil 1 milliLiter(s) 1 milliLiter(s) SubLingual <User Schedule>  minocycline 100 milliGRAM(s) Oral two times a day  mirtazapine 7.5 milliGRAM(s) Oral at bedtime  pantoprazole   Suspension 40 milliGRAM(s) Oral before breakfast  predniSONE  Solution 7.5 milliGRAM(s) Enteral Tube daily  QUEtiapine 25 milliGRAM(s) Oral at bedtime  sertraline 25 milliGRAM(s) Oral daily  sodium chloride 0.9%. 250 milliLiter(s) (25 mL/Hr) IV Continuous <Continuous>  tiZANidine 4 milliGRAM(s) Oral <User Schedule>      MEDICATIONS  (PRN):  acetaminophen    Suspension .. 680 milliGRAM(s) Oral every 6 hours PRN Temp greater or equal to 38C (100.4F), Mild Pain (1 - 3)  dextrose 40% Gel 15 Gram(s) Oral once PRN Blood Glucose LESS THAN 70 milliGRAM(s)/deciliter  glucagon  Injectable 1 milliGRAM(s) IntraMuscular once PRN Glucose LESS THAN 70 milligrams/deciliter  ipratropium    for Nebulization 500 MICROGram(s) Nebulizer every 6 hours PRN Respiratory Distress  nystatin Powder 1 Application(s) Topical two times a day PRN rash/itchiness       ---___---___---___---___---___---     <<<REVIEW OF SYSTEM: >>>    unable to obtain      ---___---___---___---___---___---          <<<  VITAL SIGNS: >>>    T(F): 98.5 (01-03-19 @ 06:09), Max: 100.7 (01-02-19 @ 21:58)  HR: 91 (01-03-19 @ 12:32) (51 - 94)  BP: 141/72 (01-03-19 @ 12:32) (106/67 - 141/72)  RR: 18 (01-03-19 @ 12:32) (18 - 18)  SpO2: 99% (01-03-19 @ 12:32) (94% - 99%)  Wt(kg): --  CAPILLARY BLOOD GLUCOSE      POCT Blood Glucose.: 206 mg/dL (03 Jan 2019 12:27)    I&O's Summary    02 Jan 2019 07:01  -  03 Jan 2019 07:00  --------------------------------------------------------  IN: 1980 mL / OUT: 1000 mL / NET: 980 mL         ---___---___---___---___---___---                       PHYSICAL EXAM:    GEN: A&O X 1 , NAD , comfortable  HEENT: NCAT, PERRL, MMM, no scleral icterus, hearing intact  NECK: Supple, No JVD  CVS: S1S2 , regular , No M/R/G appreciated  PULM: CTA B/L,  no W/R/R appreciated  ABD.: soft. non tender, non distended,  bowel sounds present peg noted   Extrem: intact pulses , no edema noted  Derm: No rash or ecchymosis noted        ---___---___---___---___---___---     <<<  LAB AND IMAGING: >>>                          10.0   14.63 )-----------( 323      ( 03 Jan 2019 08:52 )             30.2               01-03    132<L>  |  98  |  52<H>  ----------------------------<  160<H>  4.1   |  22  |  0.56    Ca    9.7      03 Jan 2019 06:34                                 [All pertinent / recent available Imaging reports and other labs reviewed]     ---___---___---___---___---___---___ ---___---___---___---___---           <<<  A S S E S S M E N T   A N D   P L A N :  >>>          -GI/DVT Prophylaxis.    --------------------------------------------  Case discussed with   Education given on     >>______________________<<      Deniz Bolden .         phone   [phone]

## 2019-01-03 NOTE — PROGRESS NOTE ADULT - SUBJECTIVE AND OBJECTIVE BOX
CC: f/u for fever    Patient is non verbal    REVIEW OF SYSTEMS:  All other review of systems negative (Comprehensive ROS): tolerating peg feeds,no respiratory complaints    Antimicrobials Day #  :  minocycline 100 milliGRAM(s) Oral two times a day    Other Medications Reviewed  Vital Signs Last 24 Hrs  T(C): 37.6 (03 Jan 2019 16:54), Max: 38.2 (02 Jan 2019 21:58)  T(F): 99.6 (03 Jan 2019 16:54), Max: 100.7 (02 Jan 2019 21:58)  HR: 95 (03 Jan 2019 16:54) (51 - 95)  BP: 96/68 (03 Jan 2019 16:54) (96/68 - 141/72)  BP(mean): --  RR: 18 (03 Jan 2019 16:54) (18 - 18)  SpO2: 95% (03 Jan 2019 16:54) (95% - 99%)    PHYSICAL EXAM:  General: awake no acute distress  Eyes:  anicteric, no conjunctival injection, no discharge  Oropharynx: no lesions or injection 	  Neck: supple, without adenopathy  Lungs: diminished at bases  Heart: regular rate and rhythm; no murmur, rubs or gallops  Abdomen: soft, nondistended, nontender, without mass or organomegaly  skin: no rashes  Extremities: contacted  Neurologic: awake, poorly interactive    LAB RESULTS:                                       10.0   14.63 )-----------( 323      ( 03 Jan 2019 08:52 )             30.2   01-03    132<L>  |  98  |  52<H>  ----------------------------<  160<H>  4.1   |  22  |  0.56    Ca    9.7      03 Jan 2019 06:34        MICROBIOLOGY:  RECENT CULTURES:  12-29 @ 02:43 .Blood Blood-Peripheral     No growth to date.      12-28 @ 08:59 .Urine Catheterized     >100,000 CFU/ml Yeast-like cells, presumptively not Candida albicans          RADIOLOGY REVIEWED:    < from: CT Chest No Cont (12.31.18 @ 10:09) >  IMPRESSION:   No pneumonia.    < end of copied text >

## 2019-01-03 NOTE — CHART NOTE - NSCHARTNOTEFT_GEN_A_CORE
Nutrition Follow Up Note  Patient seen for: malnutrition follow-up     Chart reviewed, events noted. 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 Remains with fever of unknown etiology.     Source: medical record, RN,  at bedside, pt non-verbal with dementia sitting up in bed.     Diet : NPO with enteral feeds     Enteral /Parenteral Nutrition: Vital AF @ 200ml q4hrs via PEG (Per RN pt receiving total of 5 feeds per day 8am-10pm, feeds stopped over night ). Per , pt tolerating bolus regimen much better than continuous feeds. Notes frequency of BMs improves when pt receives Banatrol Plus consistency by nursing staff. Pt only had 3 BMs yesterday which is normal for pt, no acute GI distress noted.     Dosing wt 99.88 lbs (12/4)  12/3: 124lbs --? accuracy  12/20: 91lbs   12/21: 92.3 lbs  12/27: 93 lbs   12/31: 98.1 lbs (taken by RD, ? accuracy)  1/2: 92.8 lbs   --will continue to monitor weight trends and need to increase EN regimen.     Pertinent Medications: MEDICATIONS  (STANDING):  ALBUTerol/ipratropium for Nebulization 3 milliLiter(s) Nebulizer every 6 hours  apixaban 5 milliGRAM(s) Oral every 12 hours  AQUAPHOR (petrolatum Ointment) 1 Application(s) Topical three times a day  buDESOnide 160 MICROgram(s)/formoterol 4.5 MICROgram(s) Inhaler 2 Puff(s) Inhalation two times a day  clobetasol 0.05% Ointment 1 Application(s) Topical two times a day  dextrose 5%. 1000 milliLiter(s) (50 mL/Hr) IV Continuous <Continuous>  dextrose 50% Injectable 12.5 Gram(s) IV Push once  dextrose 50% Injectable 25 Gram(s) IV Push once  dextrose 50% Injectable 25 Gram(s) IV Push once  ferrous    sulfate Liquid 300 milliGRAM(s) Enteral Tube daily  gabapentin   Solution 300 milliGRAM(s) Oral two times a day  insulin lispro (HumaLOG) corrective regimen sliding scale   SubCutaneous every 6 hours  lactobacillus acidophilus 1 Tablet(s) Oral daily  lisinopril 5 milliGRAM(s) Oral daily  medical marijuana oil 1 milliLiter(s),medical marijuana oil 1 milliLiter(s),medical marijuana oil 1 milliLiter(s) 1 milliLiter(s) SubLingual <User Schedule>  minocycline 100 milliGRAM(s) Oral two times a day  mirtazapine 7.5 milliGRAM(s) Oral at bedtime  pantoprazole   Suspension 40 milliGRAM(s) Oral before breakfast  predniSONE  Solution 7.5 milliGRAM(s) Enteral Tube daily  QUEtiapine 25 milliGRAM(s) Oral at bedtime  sertraline 25 milliGRAM(s) Oral daily  sodium chloride 0.9%. 250 milliLiter(s) (25 mL/Hr) IV Continuous <Continuous>  tiZANidine 4 milliGRAM(s) Oral <User Schedule>    MEDICATIONS  (PRN):  acetaminophen    Suspension .. 680 milliGRAM(s) Oral every 6 hours PRN Temp greater or equal to 38C (100.4F), Mild Pain (1 - 3)  dextrose 40% Gel 15 Gram(s) Oral once PRN Blood Glucose LESS THAN 70 milliGRAM(s)/deciliter  glucagon  Injectable 1 milliGRAM(s) IntraMuscular once PRN Glucose LESS THAN 70 milligrams/deciliter  ipratropium    for Nebulization 500 MICROGram(s) Nebulizer every 6 hours PRN Respiratory Distress  nystatin Powder 1 Application(s) Topical two times a day PRN rash/itchiness    Pertinent Labs: 01-03 @ 06:34: Na 132<L>, BUN 52<H>, Cr 0.56, <H>, K+ 4.1, Phos --, Mg --, Alk Phos --, ALT/SGPT --, AST/SGOT --, HbA1c --    Finger Sticks:  POCT Blood Glucose.: 163 mg/dL (01-03 @ 06:11)  POCT Blood Glucose.: 125 mg/dL (01-03 @ 00:27)  POCT Blood Glucose.: 142 mg/dL (01-02 @ 18:24)  POCT Blood Glucose.: 175 mg/dL (01-02 @ 12:23)      Skin per nursing documentation: unstageable sacrum, stage 2 lumbar spine   Edema: none     Estimated Needs:   [ x] no change since previous assessment  [ ] recalculated:     Previous Nutrition Diagnosis: malnutrition (severe) + increased nutrient needs   Nutrition Diagnosis is: on going, being addressed with enteral feeds     New Nutrition Diagnosis: N/A       Interventions:   Recommend  1) Continue bolus feeds of Vital AF @ 200ml q4hrs (5 feeds per day from 8am-10pm). Regimen to provide 1000ml total volume, 1200kcal (28.5Kcal/Kg) and 75g (1.78g/Kg) protein and 811ml free water based on current wt of 42.1Kg. Defer additional free water to team. Regimen provides 811ml free water.  2) Continue Banatrol Plus TID in setting of loose BMs. Provider to RN in EMR.   3) Monitor tolerance to EN and weights, adjust EN regimen as needed.         Monitoring and Evaluation:     Continue to monitor Nutritional intake, Tolerance to diet prescription, weights, labs, skin integrity    RD remains available upon request and will follow up per protocol  Shasha Hare RD, CDN, Pager # [phone]

## 2019-01-03 NOTE — PROGRESS NOTE ADULT - ASSESSMENT
Assessment:  Advanced dementia, right thr mrsa infection s/p leisa and spacer on suppressive minocycline, recurrent aspiration events, chronic gavin completed  tx for cre pseudomonas  s/p full course of Zerbaxa   History of UC, has not been active recently  repeat ucx---yeast which is a colonizing vince   persistent fevers even while on Ceftazoline-tazobactam  The differential of fever includes infection,malignancy as well as inflammatory fever.  Known DVT in leg.  Recent CT of C/A/P  with no defined infection and recent cultures and CDT remain negative.  Microaspiration is in differential  CT scan of chest 12/31 is negative  Sacral decubitis does not appear infected    PLAN:    f/u cultures if spikes  No indications for additional antibiotics other than suppressive minocycline  Leukocytosis is moderating  consider polypharmacy as possible cause of "drug fever", d/c non-essential meds if possible    [facility] guarded

## 2019-01-04 LAB
ANION GAP SERPL CALC-SCNC: 17 MMOL/L — SIGNIFICANT CHANGE UP (ref 5–17)
BUN SERPL-MCNC: 71 MG/DL — HIGH (ref 7–23)
CALCIUM SERPL-MCNC: 9.3 MG/DL — SIGNIFICANT CHANGE UP (ref 8.4–10.5)
CHLORIDE SERPL-SCNC: 97 MMOL/L — SIGNIFICANT CHANGE UP (ref 96–108)
CO2 SERPL-SCNC: 21 MMOL/L — LOW (ref 22–31)
CREAT SERPL-MCNC: 0.82 MG/DL — SIGNIFICANT CHANGE UP (ref 0.5–1.3)
CULTURE RESULTS: SIGNIFICANT CHANGE UP
GLUCOSE BLDC GLUCOMTR-MCNC: 140 MG/DL — HIGH (ref 70–99)
GLUCOSE BLDC GLUCOMTR-MCNC: 142 MG/DL — HIGH (ref 70–99)
GLUCOSE BLDC GLUCOMTR-MCNC: 147 MG/DL — HIGH (ref 70–99)
GLUCOSE BLDC GLUCOMTR-MCNC: 220 MG/DL — HIGH (ref 70–99)
GLUCOSE SERPL-MCNC: 135 MG/DL — HIGH (ref 70–99)
HCT VFR BLD CALC: 28.4 % — LOW (ref 34.5–45)
HGB BLD-MCNC: 9.2 G/DL — LOW (ref 11.5–15.5)
MCHC RBC-ENTMCNC: 30.4 PG — SIGNIFICANT CHANGE UP (ref 27–34)
MCHC RBC-ENTMCNC: 32.4 GM/DL — SIGNIFICANT CHANGE UP (ref 32–36)
MCV RBC AUTO: 93.7 FL — SIGNIFICANT CHANGE UP (ref 80–100)
ORGANISM # SPEC MICROSCOPIC CNT: SIGNIFICANT CHANGE UP
PLATELET # BLD AUTO: 315 K/UL — SIGNIFICANT CHANGE UP (ref 150–400)
POTASSIUM SERPL-MCNC: 4.5 MMOL/L — SIGNIFICANT CHANGE UP (ref 3.5–5.3)
POTASSIUM SERPL-SCNC: 4.5 MMOL/L — SIGNIFICANT CHANGE UP (ref 3.5–5.3)
RBC # BLD: 3.03 M/UL — LOW (ref 3.8–5.2)
RBC # FLD: 15.1 % — HIGH (ref 10.3–14.5)
SODIUM SERPL-SCNC: 135 MMOL/L — SIGNIFICANT CHANGE UP (ref 135–145)
SPECIMEN SOURCE: SIGNIFICANT CHANGE UP
WBC # BLD: 14.64 K/UL — HIGH (ref 3.8–10.5)
WBC # FLD AUTO: 14.64 K/UL — HIGH (ref 3.8–10.5)

## 2019-01-04 RX ORDER — SODIUM CHLORIDE 9 MG/ML
1000 INJECTION INTRAMUSCULAR; INTRAVENOUS; SUBCUTANEOUS
Qty: 0 | Refills: 0 | Status: DISCONTINUED | OUTPATIENT
Start: 2019-01-04 | End: 2019-01-16

## 2019-01-04 RX ADMIN — BUDESONIDE AND FORMOTEROL FUMARATE DIHYDRATE 2 PUFF(S): 160; 4.5 AEROSOL RESPIRATORY (INHALATION) at 21:15

## 2019-01-04 RX ADMIN — Medication 3 MILLILITER(S): at 12:16

## 2019-01-04 RX ADMIN — Medication 1 APPLICATION(S): at 13:33

## 2019-01-04 RX ADMIN — MINOCYCLINE HYDROCHLORIDE 100 MILLIGRAM(S): 45 TABLET, EXTENDED RELEASE ORAL at 06:19

## 2019-01-04 RX ADMIN — APIXABAN 5 MILLIGRAM(S): 2.5 TABLET, FILM COATED ORAL at 18:46

## 2019-01-04 RX ADMIN — Medication 1 APPLICATION(S): at 06:40

## 2019-01-04 RX ADMIN — FENTANYL CITRATE 1 PATCH: 50 INJECTION INTRAVENOUS at 19:32

## 2019-01-04 RX ADMIN — Medication 3 MILLILITER(S): at 01:42

## 2019-01-04 RX ADMIN — Medication 2: at 12:21

## 2019-01-04 RX ADMIN — APIXABAN 5 MILLIGRAM(S): 2.5 TABLET, FILM COATED ORAL at 06:19

## 2019-01-04 RX ADMIN — GABAPENTIN 300 MILLIGRAM(S): 400 CAPSULE ORAL at 19:53

## 2019-01-04 RX ADMIN — TIZANIDINE 4 MILLIGRAM(S): 4 TABLET ORAL at 10:51

## 2019-01-04 RX ADMIN — Medication 0: at 18:47

## 2019-01-04 RX ADMIN — MINOCYCLINE HYDROCHLORIDE 100 MILLIGRAM(S): 45 TABLET, EXTENDED RELEASE ORAL at 19:53

## 2019-01-04 RX ADMIN — BUDESONIDE AND FORMOTEROL FUMARATE DIHYDRATE 2 PUFF(S): 160; 4.5 AEROSOL RESPIRATORY (INHALATION) at 10:51

## 2019-01-04 RX ADMIN — Medication 1 APPLICATION(S): at 23:12

## 2019-01-04 RX ADMIN — Medication 680 MILLIGRAM(S): at 23:14

## 2019-01-04 RX ADMIN — Medication 3 MILLILITER(S): at 09:51

## 2019-01-04 RX ADMIN — Medication 1 TABLET(S): at 12:19

## 2019-01-04 RX ADMIN — PANTOPRAZOLE SODIUM 40 MILLIGRAM(S): 20 TABLET, DELAYED RELEASE ORAL at 06:19

## 2019-01-04 RX ADMIN — QUETIAPINE FUMARATE 25 MILLIGRAM(S): 200 TABLET, FILM COATED ORAL at 22:44

## 2019-01-04 RX ADMIN — Medication 3 MILLILITER(S): at 18:47

## 2019-01-04 RX ADMIN — Medication 1 APPLICATION(S): at 18:27

## 2019-01-04 RX ADMIN — Medication 300 MILLIGRAM(S): at 12:19

## 2019-01-04 RX ADMIN — Medication 680 MILLIGRAM(S): at 22:44

## 2019-01-04 RX ADMIN — TIZANIDINE 4 MILLIGRAM(S): 4 TABLET ORAL at 21:15

## 2019-01-04 RX ADMIN — GABAPENTIN 300 MILLIGRAM(S): 400 CAPSULE ORAL at 06:20

## 2019-01-04 RX ADMIN — Medication 7.5 MILLIGRAM(S): at 06:20

## 2019-01-04 RX ADMIN — SERTRALINE 25 MILLIGRAM(S): 25 TABLET, FILM COATED ORAL at 12:19

## 2019-01-04 RX ADMIN — MIRTAZAPINE 7.5 MILLIGRAM(S): 45 TABLET, ORALLY DISINTEGRATING ORAL at 22:44

## 2019-01-04 NOTE — PROGRESS NOTE ADULT - ASSESSMENT
fever  dvt   chf   dementia  chronic pain   anxiety       awaiting hematology consult   contiue to monitor  vitals     remeron zoloft and klonopin seroquel for depression and agitation  lisinopril for chf   budesonide for  asthma   fentanyl and medical marijuan for chronic pain and contractures

## 2019-01-04 NOTE — PROGRESS NOTE ADULT - SUBJECTIVE AND OBJECTIVE BOX
CC: f/u for fever    Patient awake, more alert, smiling, simple commands, no distress    REVIEW OF SYSTEMS:  All other review of systems negative (Comprehensive ROS)- limited    Antimicrobials:  minocycline 100 milliGRAM(s) Oral two times a day    Other Medications Reviewed    Vital Signs Last 24 Hrs  T(F): 100.1 (04 Jan 2019 13:50), Max: 100.4 (03 Jan 2019 23:00)  HR: 97 (04 Jan 2019 13:50) (84 - 98)  BP: 93/55 (04 Jan 2019 13:50) (93/55 - 107/45)  BP(mean): --  RR: 19 (04 Jan 2019 13:50) (18 - 19)  SpO2: 97% (04 Jan 2019 13:50) (95% - 99%)    PHYSICAL EXAM:  General: awake no acute distress  Eyes:  anicteric, no conjunctival injection, no discharge  Oropharynx: no lesions or injection 	  Neck: supple, without adenopathy  Lungs: diminished at bases  Heart: regular rate and rhythm; no murmur, rubs or gallops  Abdomen: soft, nondistended, nontender, without mass or organomegaly  [place]  Skin: no rashes  Extremities: contacted, TR thigh old wound dry  Neurologic: awake, more respoonsive    LAB RESULTS:                        9.2    14.64 )-----------( 315      ( 04 Jan 2019 08:21 )             28.4   01-04    135  |  97  |  71<H>  ----------------------------<  135<H>  4.5   |  21<L>  |  0.82    Ca    9.3      04 Jan 2019 06:50    MICROBIOLOGY:  RECENT CULTURES:  Culture - Other (01.01.19 @ 22:10)    -  Cefepime: S <=2    -  Cefoxitin: R >16    -  Aztreonam: S <=4    -  Cefazolin: R >16    -  Amikacin: S <=8    -  Amoxicillin/Clavulanic Acid: R >16/8    -  Ampicillin: R >16 These ampicillin results predict results for amoxicillin    -  Ampicillin: R >8 Predicts results to ampicillin/sulbactam, amoxacillin-clavulanate and  piperacillin-tazobactam.    -  Tobramycin: S <=2    -  Trimethoprim/Sulfamethoxazole: S <=0.5/9.5    -  Tetra/Doxy: R >8    -  Meropenem: S <=1    -  Piperacillin/Tazobactam: S <=8    -  Ciprofloxacin: S <=0.5    -  Ertapenem: S <=0.5    -  Imipenem: S <=1    -  Levofloxacin: S <=1    -  Gentamicin: S <=1    -  Ampicillin/Sulbactam: R >16/8    -  Ceftriaxone: R 8 Enterobacter, Citrobacter, and Serratia may develop resistance during prolonged therapy    -  Vancomycin: S 1    Specimen Source: Skin sacral decubitus ulcer    Culture Results:   Few Enterobacter cloacae/asburiae  Few Enterococcus faecium  Few Coag Negative Staphylococcus "Susceptibilities not performed"  Rare Yeast like cells    Organism Identification: Enterobacter cloacae/absuria  Enterococcus faecium    Organism: Enterobacter cloacae/absuria    Organism: Enterococcus faecium    Method Type: BLANCA    Method Type: BLANCA    Culture - Blood (01.01.19 @ 09:04)    Specimen Source: .Blood Blood    Culture Results:   No growth to date.    12-29 @ 02:43 .Blood Blood-Peripheral     No growth to date.    12-28 @ 08:59 .Urine Catheterized     >100,000 CFU/ml Yeast-like cells, presumptively not [place] albicans    RADIOLOGY REVIEWED:  CT Chest No Cont (12.31.18 @ 10:09) >  IMPRESSION:   No pneumonia.

## 2019-01-04 NOTE — PROGRESS NOTE ADULT - ASSESSMENT
Advanced dementia, right mrsa prosthetic hip infection, s/p leisa and spacer, on suppressive minocycline, recurrent aspiration events, chronic gavin completed, tx for cre pseudomonas  s/p full course of Zerbaxa   History of UC, has not been active recently  repeat ucx- yeast- colonizing vince, difficult to implicate as source of fever; even with such, non albicans candida would require course of IV tx  persistent fevers even while on Zerbaxa  Known DVT leg  Recent CT of C/A/P  with no defined infection   Microaspiration is in differential  Sacral decubitus described as not infected- cx with typical colonizing vince  Tmx 100.4    PLAN:  No indication for additional antibiotics other than suppressive minocycline  ? "drug fever", d/c non-essential meds if possible  Will hold antifungal Tx for now  Follow temps and CBC/diff   Approach as SARIAHO  D/w  at bedside

## 2019-01-04 NOTE — PROGRESS NOTE ADULT - SUBJECTIVE AND OBJECTIVE BOX
---___---___---___---___---___---___ ---___---___---___---___---___---___---___---___---___---                    <<<  M E D I C A L   A T T E N D I N G    F O L L O W    U P   N O T E  >>>  still with persistent fever but  coming  down    ---___---___---___---___---___---      <<<  MEDICATIONS:  >>>    MEDICATIONS  (STANDING):  ALBUTerol/ipratropium for Nebulization 3 milliLiter(s) Nebulizer every 6 hours  apixaban 5 milliGRAM(s) Oral every 12 hours  AQUAPHOR (petrolatum Ointment) 1 Application(s) Topical three times a day  buDESOnide 160 MICROgram(s)/formoterol 4.5 MICROgram(s) Inhaler 2 Puff(s) Inhalation two times a day  clobetasol 0.05% Ointment 1 Application(s) Topical two times a day  dextrose 5%. 1000 milliLiter(s) (50 mL/Hr) IV Continuous <Continuous>  dextrose 50% Injectable 12.5 Gram(s) IV Push once  dextrose 50% Injectable 25 Gram(s) IV Push once  dextrose 50% Injectable 25 Gram(s) IV Push once  ferrous    sulfate Liquid 300 milliGRAM(s) Enteral Tube daily  gabapentin   Solution 300 milliGRAM(s) Oral two times a day  insulin lispro (HumaLOG) corrective regimen sliding scale   SubCutaneous every 6 hours  lactobacillus acidophilus 1 Tablet(s) Oral daily  lisinopril 5 milliGRAM(s) Oral daily  medical marijuana oil 1 milliLiter(s),medical marijuana oil 1 milliLiter(s),medical marijuana oil 1 milliLiter(s) 1 milliLiter(s) SubLingual <User Schedule>  minocycline 100 milliGRAM(s) Oral two times a day  mirtazapine 7.5 milliGRAM(s) Oral at bedtime  pantoprazole   Suspension 40 milliGRAM(s) Oral before breakfast  predniSONE  Solution 7.5 milliGRAM(s) Enteral Tube daily  QUEtiapine 25 milliGRAM(s) Oral at bedtime  sertraline 25 milliGRAM(s) Oral daily  sodium chloride 0.9%. 250 milliLiter(s) (25 mL/Hr) IV Continuous <Continuous>  sodium chloride 0.9%. 1000 milliLiter(s) (40 mL/Hr) IV Continuous <Continuous>  tiZANidine 4 milliGRAM(s) Oral <User Schedule>      MEDICATIONS  (PRN):  acetaminophen    Suspension .. 680 milliGRAM(s) Oral every 6 hours PRN Temp greater or equal to 38C (100.4F), Mild Pain (1 - 3)  dextrose 40% Gel 15 Gram(s) Oral once PRN Blood Glucose LESS THAN 70 milliGRAM(s)/deciliter  glucagon  Injectable 1 milliGRAM(s) IntraMuscular once PRN Glucose LESS THAN 70 milligrams/deciliter  ipratropium    for Nebulization 500 MICROGram(s) Nebulizer every 6 hours PRN Respiratory Distress  nystatin Powder 1 Application(s) Topical two times a day PRN rash/itchiness       ---___---___---___---___---___---     <<<REVIEW OF SYSTEM: >>>    unable to obtain      ---___---___---___---___---___---          <<<  VITAL SIGNS: >>>    T(F): 99.1 (01-04-19 @ 03:15), Max: 100.4 (01-03-19 @ 23:00)  HR: 84 (01-04-19 @ 04:29) (84 - 98)  BP: 98/60 (01-04-19 @ 03:15) (96/68 - 141/72)  RR: 19 (01-04-19 @ 04:29) (18 - 19)  SpO2: 98% (01-04-19 @ 04:29) (95% - 99%)  Wt(kg): --  CAPILLARY BLOOD GLUCOSE      POCT Blood Glucose.: 147 mg/dL (04 Jan 2019 06:08)    I&O's Summary    03 Jan 2019 07:01  -  04 Jan 2019 07:00  --------------------------------------------------------  IN: 1540 mL / OUT: 810 mL / NET: 730 mL         ---___---___---___---___---___---                       PHYSICAL EXAM:    GEN: A&O X 1 , NAD , comfortable  HEENT: NCAT, PERRL, MMM, no scleral icterus, hearing intact  NECK: Supple, No JVD  CVS: S1S2 , regular , No M/R/G appreciated  PULM: CTA B/L,  no W/R/R appreciated  ABD.: soft. non tender, non distended,  bowel sounds present  Extrem: intact pulses , no edema noted  Derm: No rash or ecchymosis noted sacral decubitus noted         ---___---___---___---___---___---     <<<  LAB AND IMAGING: >>>                          10.0   14.63 )-----------( 323      ( 03 Jan 2019 08:52 )             30.2               01-04    135  |  97  |  71<H>  ----------------------------<  135<H>  4.5   |  21<L>  |  0.82    Ca    9.3      04 Jan 2019 06:50                                 [All pertinent / recent available Imaging reports and other labs reviewed]     ---___---___---___---___---___---___ ---___---___---___---___---           <<<  A S S E S S M E N T   A N D   P L A N :  >>>          -GI/DVT Prophylaxis.    --------------------------------------------  Case discussed with   Education given on     >>______________________<<      Deniz Bolden .         phone   [phone]

## 2019-01-05 LAB
ANION GAP SERPL CALC-SCNC: 16 MMOL/L — SIGNIFICANT CHANGE UP (ref 5–17)
BUN SERPL-MCNC: 64 MG/DL — HIGH (ref 7–23)
CALCIUM SERPL-MCNC: 9.4 MG/DL — SIGNIFICANT CHANGE UP (ref 8.4–10.5)
CHLORIDE SERPL-SCNC: 96 MMOL/L — SIGNIFICANT CHANGE UP (ref 96–108)
CO2 SERPL-SCNC: 21 MMOL/L — LOW (ref 22–31)
CREAT SERPL-MCNC: 0.59 MG/DL — SIGNIFICANT CHANGE UP (ref 0.5–1.3)
GLUCOSE BLDC GLUCOMTR-MCNC: 122 MG/DL — HIGH (ref 70–99)
GLUCOSE BLDC GLUCOMTR-MCNC: 143 MG/DL — HIGH (ref 70–99)
GLUCOSE BLDC GLUCOMTR-MCNC: 206 MG/DL — HIGH (ref 70–99)
GLUCOSE SERPL-MCNC: 125 MG/DL — HIGH (ref 70–99)
HCT VFR BLD CALC: 28.1 % — LOW (ref 34.5–45)
HGB BLD-MCNC: 9.1 G/DL — LOW (ref 11.5–15.5)
MCHC RBC-ENTMCNC: 30.2 PG — SIGNIFICANT CHANGE UP (ref 27–34)
MCHC RBC-ENTMCNC: 32.4 GM/DL — SIGNIFICANT CHANGE UP (ref 32–36)
MCV RBC AUTO: 93.4 FL — SIGNIFICANT CHANGE UP (ref 80–100)
PLATELET # BLD AUTO: 320 K/UL — SIGNIFICANT CHANGE UP (ref 150–400)
POTASSIUM SERPL-MCNC: 3.9 MMOL/L — SIGNIFICANT CHANGE UP (ref 3.5–5.3)
POTASSIUM SERPL-SCNC: 3.9 MMOL/L — SIGNIFICANT CHANGE UP (ref 3.5–5.3)
RBC # BLD: 3.01 M/UL — LOW (ref 3.8–5.2)
RBC # FLD: 15.2 % — HIGH (ref 10.3–14.5)
SODIUM SERPL-SCNC: 133 MMOL/L — LOW (ref 135–145)
WBC # BLD: 14.67 K/UL — HIGH (ref 3.8–10.5)
WBC # FLD AUTO: 14.67 K/UL — HIGH (ref 3.8–10.5)

## 2019-01-05 RX ORDER — CLONAZEPAM 1 MG
0.5 TABLET ORAL AT BEDTIME
Qty: 0 | Refills: 0 | Status: DISCONTINUED | OUTPATIENT
Start: 2019-01-05 | End: 2019-01-12

## 2019-01-05 RX ORDER — CLONAZEPAM 1 MG
0.5 TABLET ORAL AT BEDTIME
Qty: 0 | Refills: 0 | Status: DISCONTINUED | OUTPATIENT
Start: 2019-01-05 | End: 2019-01-05

## 2019-01-05 RX ADMIN — Medication 1 APPLICATION(S): at 23:16

## 2019-01-05 RX ADMIN — GABAPENTIN 300 MILLIGRAM(S): 400 CAPSULE ORAL at 06:33

## 2019-01-05 RX ADMIN — Medication 680 MILLIGRAM(S): at 09:58

## 2019-01-05 RX ADMIN — APIXABAN 5 MILLIGRAM(S): 2.5 TABLET, FILM COATED ORAL at 17:44

## 2019-01-05 RX ADMIN — Medication 1 APPLICATION(S): at 06:34

## 2019-01-05 RX ADMIN — TIZANIDINE 4 MILLIGRAM(S): 4 TABLET ORAL at 08:45

## 2019-01-05 RX ADMIN — MINOCYCLINE HYDROCHLORIDE 100 MILLIGRAM(S): 45 TABLET, EXTENDED RELEASE ORAL at 17:44

## 2019-01-05 RX ADMIN — Medication 0.5 MILLIGRAM(S): at 00:32

## 2019-01-05 RX ADMIN — BUDESONIDE AND FORMOTEROL FUMARATE DIHYDRATE 2 PUFF(S): 160; 4.5 AEROSOL RESPIRATORY (INHALATION) at 23:17

## 2019-01-05 RX ADMIN — PANTOPRAZOLE SODIUM 40 MILLIGRAM(S): 20 TABLET, DELAYED RELEASE ORAL at 06:34

## 2019-01-05 RX ADMIN — APIXABAN 5 MILLIGRAM(S): 2.5 TABLET, FILM COATED ORAL at 06:34

## 2019-01-05 RX ADMIN — TIZANIDINE 4 MILLIGRAM(S): 4 TABLET ORAL at 21:02

## 2019-01-05 RX ADMIN — Medication 1 APPLICATION(S): at 17:17

## 2019-01-05 RX ADMIN — MIRTAZAPINE 7.5 MILLIGRAM(S): 45 TABLET, ORALLY DISINTEGRATING ORAL at 22:02

## 2019-01-05 RX ADMIN — Medication 2: at 12:22

## 2019-01-05 RX ADMIN — Medication 300 MILLIGRAM(S): at 12:22

## 2019-01-05 RX ADMIN — QUETIAPINE FUMARATE 25 MILLIGRAM(S): 200 TABLET, FILM COATED ORAL at 22:02

## 2019-01-05 RX ADMIN — Medication 680 MILLIGRAM(S): at 10:30

## 2019-01-05 RX ADMIN — BUDESONIDE AND FORMOTEROL FUMARATE DIHYDRATE 2 PUFF(S): 160; 4.5 AEROSOL RESPIRATORY (INHALATION) at 12:21

## 2019-01-05 RX ADMIN — Medication 1 TABLET(S): at 12:23

## 2019-01-05 RX ADMIN — LISINOPRIL 5 MILLIGRAM(S): 2.5 TABLET ORAL at 06:34

## 2019-01-05 RX ADMIN — MINOCYCLINE HYDROCHLORIDE 100 MILLIGRAM(S): 45 TABLET, EXTENDED RELEASE ORAL at 06:34

## 2019-01-05 RX ADMIN — Medication 3 MILLILITER(S): at 12:23

## 2019-01-05 RX ADMIN — Medication 1 APPLICATION(S): at 13:31

## 2019-01-05 RX ADMIN — GABAPENTIN 300 MILLIGRAM(S): 400 CAPSULE ORAL at 17:44

## 2019-01-05 RX ADMIN — Medication 0.5 MILLIGRAM(S): at 22:02

## 2019-01-05 RX ADMIN — Medication 3 MILLILITER(S): at 17:44

## 2019-01-05 RX ADMIN — SERTRALINE 25 MILLIGRAM(S): 25 TABLET, FILM COATED ORAL at 12:23

## 2019-01-05 RX ADMIN — Medication 7.5 MILLIGRAM(S): at 06:34

## 2019-01-05 RX ADMIN — FENTANYL CITRATE 1 PATCH: 50 INJECTION INTRAVENOUS at 07:46

## 2019-01-05 NOTE — PROGRESS NOTE ADULT - SUBJECTIVE AND OBJECTIVE BOX
---___---___---___---___---___---___ ---___---___---___---___---___---___---___---___---___---                    <<<  M E D I C A L   A T T E N D I N G    F O L L O W    U P   N O T E  >>>  feeling better      ---___---___---___---___---___---      <<<  MEDICATIONS:  >>>    MEDICATIONS  (STANDING):  ALBUTerol/ipratropium for Nebulization 3 milliLiter(s) Nebulizer every 6 hours  apixaban 5 milliGRAM(s) Oral every 12 hours  AQUAPHOR (petrolatum Ointment) 1 Application(s) Topical three times a day  buDESOnide 160 MICROgram(s)/formoterol 4.5 MICROgram(s) Inhaler 2 Puff(s) Inhalation two times a day  clobetasol 0.05% Ointment 1 Application(s) Topical two times a day  clonazePAM Tablet 0.5 milliGRAM(s) Oral at bedtime  dextrose 5%. 1000 milliLiter(s) (50 mL/Hr) IV Continuous <Continuous>  dextrose 50% Injectable 12.5 Gram(s) IV Push once  dextrose 50% Injectable 25 Gram(s) IV Push once  dextrose 50% Injectable 25 Gram(s) IV Push once  ferrous    sulfate Liquid 300 milliGRAM(s) Enteral Tube daily  gabapentin   Solution 300 milliGRAM(s) Oral two times a day  insulin lispro (HumaLOG) corrective regimen sliding scale   SubCutaneous every 6 hours  lactobacillus acidophilus 1 Tablet(s) Oral daily  lisinopril 5 milliGRAM(s) Oral daily  medical marijuana oil 1 milliLiter(s),medical marijuana oil 1 milliLiter(s),medical marijuana oil 1 milliLiter(s) 1 milliLiter(s) SubLingual <User Schedule>  minocycline 100 milliGRAM(s) Oral two times a day  mirtazapine 7.5 milliGRAM(s) Oral at bedtime  pantoprazole   Suspension 40 milliGRAM(s) Oral before breakfast  predniSONE  Solution 7.5 milliGRAM(s) Enteral Tube daily  QUEtiapine 25 milliGRAM(s) Oral at bedtime  sertraline 25 milliGRAM(s) Oral daily  sodium chloride 0.9%. 250 milliLiter(s) (25 mL/Hr) IV Continuous <Continuous>  sodium chloride 0.9%. 1000 milliLiter(s) (40 mL/Hr) IV Continuous <Continuous>  tiZANidine 4 milliGRAM(s) Oral <User Schedule>      MEDICATIONS  (PRN):  acetaminophen    Suspension .. 680 milliGRAM(s) Oral every 6 hours PRN Temp greater or equal to 38C (100.4F), Mild Pain (1 - 3)  dextrose 40% Gel 15 Gram(s) Oral once PRN Blood Glucose LESS THAN 70 milliGRAM(s)/deciliter  glucagon  Injectable 1 milliGRAM(s) IntraMuscular once PRN Glucose LESS THAN 70 milligrams/deciliter  ipratropium    for Nebulization 500 MICROGram(s) Nebulizer every 6 hours PRN Respiratory Distress  nystatin Powder 1 Application(s) Topical two times a day PRN rash/itchiness       ---___---___---___---___---___---     <<<REVIEW OF SYSTEM: >>>    unable to obtain      ---___---___---___---___---___---          <<<  VITAL SIGNS: >>>    T(F): 100 (01-05-19 @ 09:58), Max: 100.2 (01-04-19 @ 21:16)  HR: 82 (01-05-19 @ 11:45) (82 - 94)  BP: 94/57 (01-05-19 @ 11:45) (94/57 - 114/78)  RR: 18 (01-05-19 @ 11:45) (18 - 19)  SpO2: 97% (01-05-19 @ 11:45) (96% - 97%)  Wt(kg): --  CAPILLARY BLOOD GLUCOSE      POCT Blood Glucose.: 206 mg/dL (05 Jan 2019 12:08)    I&O's Summary    04 Jan 2019 07:01  -  05 Jan 2019 07:00  --------------------------------------------------------  IN: 400 mL / OUT: 1050 mL / NET: -650 mL    05 Jan 2019 07:01  -  05 Jan 2019 15:37  --------------------------------------------------------  IN: 0 mL / OUT: 50 mL / NET: -50 mL         ---___---___---___---___---___---                       PHYSICAL EXAM:    GEN: A&O X 0 , NAD , comfortable  HEENT: NCAT, PERRL, MMM, no scleral icterus, hearing intact  NECK: Supple, No JVD  CVS: S1S2 , regular , No M/R/G appreciated  PULM: CTA B/L,  no W/R/R appreciated  ABD.: soft. non tender, non distended,  bowel sounds present  Extrem: intact pulses , no edema noted  Derm: No rash or ecchymosis noted  PSYCH: normal mood, no depression, not anxious     ---___---___---___---___---___---     <<<  LAB AND IMAGING: >>>                          9.1    14.67 )-----------( 320      ( 05 Jan 2019 09:13 )             28.1               01-05    133<L>  |  96  |  64<H>  ----------------------------<  125<H>  3.9   |  21<L>  |  0.59    Ca    9.4      05 Jan 2019 07:16                                 [All pertinent / recent available Imaging reports and other labs reviewed]     ---___---___---___---___---___---___ ---___---___---___---___---           <<<  A S S E S S M E N T   A N YE   P L A N :  >>>          -GI/DVT Prophylaxis.    --------------------------------------------  Case discussed with   Education given on     >>______________________<<      Deniz Bolden .         phone   [phone]

## 2019-01-05 NOTE — PROGRESS NOTE ADULT - ASSESSMENT
fever   dvt   chf   anxiety and dementia   chronic pain       fever gradually improving  dvt has gotten smaller  will continue to monitor    if stable without fever can be dc'ed home

## 2019-01-06 LAB
ANION GAP SERPL CALC-SCNC: 16 MMOL/L — SIGNIFICANT CHANGE UP (ref 5–17)
BUN SERPL-MCNC: 61 MG/DL — HIGH (ref 7–23)
CALCIUM SERPL-MCNC: 9.6 MG/DL — SIGNIFICANT CHANGE UP (ref 8.4–10.5)
CHLORIDE SERPL-SCNC: 97 MMOL/L — SIGNIFICANT CHANGE UP (ref 96–108)
CO2 SERPL-SCNC: 23 MMOL/L — SIGNIFICANT CHANGE UP (ref 22–31)
CREAT SERPL-MCNC: 0.52 MG/DL — SIGNIFICANT CHANGE UP (ref 0.5–1.3)
CULTURE RESULTS: SIGNIFICANT CHANGE UP
CULTURE RESULTS: SIGNIFICANT CHANGE UP
GLUCOSE BLDC GLUCOMTR-MCNC: 124 MG/DL — HIGH (ref 70–99)
GLUCOSE BLDC GLUCOMTR-MCNC: 140 MG/DL — HIGH (ref 70–99)
GLUCOSE BLDC GLUCOMTR-MCNC: 159 MG/DL — HIGH (ref 70–99)
GLUCOSE BLDC GLUCOMTR-MCNC: 228 MG/DL — HIGH (ref 70–99)
GLUCOSE SERPL-MCNC: 130 MG/DL — HIGH (ref 70–99)
HCT VFR BLD CALC: 29.2 % — LOW (ref 34.5–45)
HGB BLD-MCNC: 9.6 G/DL — LOW (ref 11.5–15.5)
MAGNESIUM SERPL-MCNC: 1.9 MG/DL — SIGNIFICANT CHANGE UP (ref 1.6–2.6)
MCHC RBC-ENTMCNC: 30.1 PG — SIGNIFICANT CHANGE UP (ref 27–34)
MCHC RBC-ENTMCNC: 32.9 GM/DL — SIGNIFICANT CHANGE UP (ref 32–36)
MCV RBC AUTO: 91.5 FL — SIGNIFICANT CHANGE UP (ref 80–100)
PHOSPHATE SERPL-MCNC: 2.9 MG/DL — SIGNIFICANT CHANGE UP (ref 2.5–4.5)
PLATELET # BLD AUTO: 301 K/UL — SIGNIFICANT CHANGE UP (ref 150–400)
POTASSIUM SERPL-MCNC: 3.8 MMOL/L — SIGNIFICANT CHANGE UP (ref 3.5–5.3)
POTASSIUM SERPL-SCNC: 3.8 MMOL/L — SIGNIFICANT CHANGE UP (ref 3.5–5.3)
RBC # BLD: 3.19 M/UL — LOW (ref 3.8–5.2)
RBC # FLD: 15.3 % — HIGH (ref 10.3–14.5)
SODIUM SERPL-SCNC: 136 MMOL/L — SIGNIFICANT CHANGE UP (ref 135–145)
SPECIMEN SOURCE: SIGNIFICANT CHANGE UP
SPECIMEN SOURCE: SIGNIFICANT CHANGE UP
WBC # BLD: 18.12 K/UL — HIGH (ref 3.8–10.5)
WBC # FLD AUTO: 18.12 K/UL — HIGH (ref 3.8–10.5)

## 2019-01-06 RX ORDER — FENTANYL CITRATE 50 UG/ML
1 INJECTION INTRAVENOUS
Qty: 0 | Refills: 0 | Status: DISCONTINUED | OUTPATIENT
Start: 2019-01-06 | End: 2019-01-10

## 2019-01-06 RX ADMIN — Medication 1 APPLICATION(S): at 22:54

## 2019-01-06 RX ADMIN — Medication 1 APPLICATION(S): at 15:18

## 2019-01-06 RX ADMIN — TIZANIDINE 4 MILLIGRAM(S): 4 TABLET ORAL at 08:42

## 2019-01-06 RX ADMIN — Medication 3 MILLILITER(S): at 11:59

## 2019-01-06 RX ADMIN — Medication 1 APPLICATION(S): at 18:14

## 2019-01-06 RX ADMIN — GABAPENTIN 300 MILLIGRAM(S): 400 CAPSULE ORAL at 07:02

## 2019-01-06 RX ADMIN — Medication 2: at 12:13

## 2019-01-06 RX ADMIN — Medication 300 MILLIGRAM(S): at 11:59

## 2019-01-06 RX ADMIN — FENTANYL CITRATE 1 PATCH: 50 INJECTION INTRAVENOUS at 19:42

## 2019-01-06 RX ADMIN — Medication 0.5 MILLIGRAM(S): at 21:48

## 2019-01-06 RX ADMIN — APIXABAN 5 MILLIGRAM(S): 2.5 TABLET, FILM COATED ORAL at 06:41

## 2019-01-06 RX ADMIN — Medication 3 MILLILITER(S): at 18:07

## 2019-01-06 RX ADMIN — Medication 7.5 MILLIGRAM(S): at 06:40

## 2019-01-06 RX ADMIN — FENTANYL CITRATE 1 PATCH: 50 INJECTION INTRAVENOUS at 11:00

## 2019-01-06 RX ADMIN — LISINOPRIL 5 MILLIGRAM(S): 2.5 TABLET ORAL at 06:40

## 2019-01-06 RX ADMIN — FENTANYL CITRATE 1 PATCH: 50 INJECTION INTRAVENOUS at 08:18

## 2019-01-06 RX ADMIN — MIRTAZAPINE 7.5 MILLIGRAM(S): 45 TABLET, ORALLY DISINTEGRATING ORAL at 21:48

## 2019-01-06 RX ADMIN — BUDESONIDE AND FORMOTEROL FUMARATE DIHYDRATE 2 PUFF(S): 160; 4.5 AEROSOL RESPIRATORY (INHALATION) at 09:57

## 2019-01-06 RX ADMIN — BUDESONIDE AND FORMOTEROL FUMARATE DIHYDRATE 2 PUFF(S): 160; 4.5 AEROSOL RESPIRATORY (INHALATION) at 21:07

## 2019-01-06 RX ADMIN — PANTOPRAZOLE SODIUM 40 MILLIGRAM(S): 20 TABLET, DELAYED RELEASE ORAL at 06:41

## 2019-01-06 RX ADMIN — MINOCYCLINE HYDROCHLORIDE 100 MILLIGRAM(S): 45 TABLET, EXTENDED RELEASE ORAL at 06:40

## 2019-01-06 RX ADMIN — Medication 3 MILLILITER(S): at 01:54

## 2019-01-06 RX ADMIN — Medication 1 APPLICATION(S): at 06:02

## 2019-01-06 RX ADMIN — APIXABAN 5 MILLIGRAM(S): 2.5 TABLET, FILM COATED ORAL at 18:08

## 2019-01-06 RX ADMIN — GABAPENTIN 300 MILLIGRAM(S): 400 CAPSULE ORAL at 18:38

## 2019-01-06 RX ADMIN — MINOCYCLINE HYDROCHLORIDE 100 MILLIGRAM(S): 45 TABLET, EXTENDED RELEASE ORAL at 18:08

## 2019-01-06 RX ADMIN — TIZANIDINE 4 MILLIGRAM(S): 4 TABLET ORAL at 21:07

## 2019-01-06 RX ADMIN — Medication 1: at 18:08

## 2019-01-06 RX ADMIN — QUETIAPINE FUMARATE 25 MILLIGRAM(S): 200 TABLET, FILM COATED ORAL at 21:48

## 2019-01-06 RX ADMIN — Medication 3 MILLILITER(S): at 06:40

## 2019-01-06 RX ADMIN — SERTRALINE 25 MILLIGRAM(S): 25 TABLET, FILM COATED ORAL at 11:59

## 2019-01-06 RX ADMIN — Medication 1 TABLET(S): at 11:59

## 2019-01-06 NOTE — PROGRESS NOTE ADULT - SUBJECTIVE AND OBJECTIVE BOX
---___---___---___---___---___---___ ---___---___---___---___---___---___---___---___---___---                    <<<  M E D I C A L   A T T E N D I N G    F O L L O W    U P   N O T E  >>>    fevers have been improving. doing better      ---___---___---___---___---___---      <<<  MEDICATIONS:  >>>    MEDICATIONS  (STANDING):  ALBUTerol/ipratropium for Nebulization 3 milliLiter(s) Nebulizer every 6 hours  apixaban 5 milliGRAM(s) Oral every 12 hours  AQUAPHOR (petrolatum Ointment) 1 Application(s) Topical three times a day  buDESOnide 160 MICROgram(s)/formoterol 4.5 MICROgram(s) Inhaler 2 Puff(s) Inhalation two times a day  clobetasol 0.05% Ointment 1 Application(s) Topical two times a day  clonazePAM Tablet 0.5 milliGRAM(s) Oral at bedtime  dextrose 5%. 1000 milliLiter(s) (50 mL/Hr) IV Continuous <Continuous>  dextrose 50% Injectable 12.5 Gram(s) IV Push once  dextrose 50% Injectable 25 Gram(s) IV Push once  dextrose 50% Injectable 25 Gram(s) IV Push once  fentaNYL   Patch  12 MICROgram(s)/Hr 1 Patch Transdermal every 72 hours  ferrous    sulfate Liquid 300 milliGRAM(s) Enteral Tube daily  gabapentin   Solution 300 milliGRAM(s) Oral two times a day  insulin lispro (HumaLOG) corrective regimen sliding scale   SubCutaneous every 6 hours  lactobacillus acidophilus 1 Tablet(s) Oral daily  lisinopril 5 milliGRAM(s) Oral daily  medical marijuana oil 1 milliLiter(s),medical marijuana oil 1 milliLiter(s),medical marijuana oil 1 milliLiter(s) 1 milliLiter(s) SubLingual <User Schedule>  minocycline 100 milliGRAM(s) Oral two times a day  mirtazapine 7.5 milliGRAM(s) Oral at bedtime  pantoprazole   Suspension 40 milliGRAM(s) Oral before breakfast  predniSONE  Solution 7.5 milliGRAM(s) Enteral Tube daily  QUEtiapine 25 milliGRAM(s) Oral at bedtime  sertraline 25 milliGRAM(s) Oral daily  sodium chloride 0.9%. 250 milliLiter(s) (25 mL/Hr) IV Continuous <Continuous>  sodium chloride 0.9%. 1000 milliLiter(s) (40 mL/Hr) IV Continuous <Continuous>  tiZANidine 4 milliGRAM(s) Oral <User Schedule>      MEDICATIONS  (PRN):  acetaminophen    Suspension .. 680 milliGRAM(s) Oral every 6 hours PRN Temp greater or equal to 38C (100.4F), Mild Pain (1 - 3)  dextrose 40% Gel 15 Gram(s) Oral once PRN Blood Glucose LESS THAN 70 milliGRAM(s)/deciliter  glucagon  Injectable 1 milliGRAM(s) IntraMuscular once PRN Glucose LESS THAN 70 milligrams/deciliter  ipratropium    for Nebulization 500 MICROGram(s) Nebulizer every 6 hours PRN Respiratory Distress  nystatin Powder 1 Application(s) Topical two times a day PRN rash/itchiness       ---___---___---___---___---___---     <<<REVIEW OF SYSTEM: >>>    unable to obtain      ---___---___---___---___---___---          <<<  VITAL SIGNS: >>>    T(F): 98.3 (01-06-19 @ 14:01), Max: 100.1 (01-06-19 @ 00:37)  HR: 96 (01-06-19 @ 14:01) (80 - 96)  BP: 132/60 (01-06-19 @ 14:01) (113/68 - 132/60)  RR: 17 (01-06-19 @ 14:01) (16 - 18)  SpO2: 100% (01-06-19 @ 14:01) (95% - 100%)  Wt(kg): --  CAPILLARY BLOOD GLUCOSE      POCT Blood Glucose.: 228 mg/dL (06 Jan 2019 11:57)    I&O's Summary    05 Jan 2019 07:01  -  06 Jan 2019 07:00  --------------------------------------------------------  IN: 1260 mL / OUT: 1000 mL / NET: 260 mL    06 Jan 2019 07:01  -  06 Jan 2019 14:31  --------------------------------------------------------  IN: 600 mL / OUT: 0 mL / NET: 600 mL         ---___---___---___---___---___---                       PHYSICAL EXAM:    GEN: A&O X1 , NAD , comfortable  HEENT: NCAT, PERRL, MMM, no scleral icterus, hearing intact  NECK: Supple, No JVD  CVS: S1S2 , regular , No M/R/G appreciated  PULM: CTA B/L,  no W/R/R appreciated  ABD.: soft. non tender, non distended,  bowel sounds present peg noted   Extrem: intact pulses , no edema noted  Derm: No rash or ecchymosis noted  PSYCH: normal mood, no depression, not anxious     ---___---___---___---___---___---     <<<  LAB AND IMAGING: >>>                          9.6    18.12 )-----------( 301      ( 06 Jan 2019 09:21 )             29.2               01-06    136  |  97  |  61<H>  ----------------------------<  130<H>  3.8   |  23  |  0.52    Ca    9.6      06 Jan 2019 07:41  Phos  2.9     01-06  Mg     1.9     01-06                                 [All pertinent / recent available Imaging reports and other labs reviewed]     ---___---___---___---___---___---___ ---___---___---___---___---           <<<  A S S E S S M E N T   A N D   P L A N :  >>>          -GI/DVT Prophylaxis.    --------------------------------------------  Case discussed with   Education given on     >>______________________<<      Deniz Bolden .         phone   [phone]

## 2019-01-06 NOTE — PROGRESS NOTE ADULT - ASSESSMENT
Advanced dementia, right mrsa prosthetic hip infection, s/p leisa and spacer, on suppressive minocycline, recurrent aspiration events, chronic gavin completed tx for cre pseudomonas  s/p full course of Zerbaxa   History of UC, not active recently  Repeat ucx- yeast- colonizing vince, difficult to implicate as source of fever; even with such, non albicans candida would require course of IV tx  Persistent fevers even while on Zerbaxa  Known extensive DVT R LE- ?link to fever  Recent CT of C/A/P  with no defined infection   Microaspiration still possible  Sacral decubitus described as not infected- cx with typical colonizing vince  Tmx 100.1, along with leukocytosis, in this setting- more alert, hemodyn stable, no new findings, will continue to approach conservatively    PLAN:  No indication for additional antibiotics other than suppressive minocycline  Hold antifungal Tx for now  Follow temps and CBC/diff   Approach as [place]  D/w Dr Bolden  D/w  at bedside

## 2019-01-06 NOTE — PROGRESS NOTE ADULT - ASSESSMENT
fever improving if afebril x 24 hours dc home   chf continue medication  depression anxiety ,  dc zoloft and continue remeron and klonopin and seroquel  asthma contrinue meds  chronic pain on medical marijuana and fentanyl

## 2019-01-06 NOTE — PROGRESS NOTE ADULT - SUBJECTIVE AND OBJECTIVE BOX
CC: f/u for fever    Patient remains awake, more alert; as per  pt interactive, vocal last night- "close to her old self"    REVIEW OF SYSTEMS:  All other review of systems negative (Comprehensive ROS)- limited, nonverbal at present    Antimicrobials:  minocycline 100 milliGRAM(s) Oral two times a day    Other Medications Reviewed    Vital Signs Last 24 Hrs  T(F): 97.9 (06 Jan 2019 08:26), Max: 100.1 (06 Jan 2019 00:37)  HR: 85 (06 Jan 2019 08:26) (80 - 92)  BP: 115/64 (06 Jan 2019 08:26) (113/68 - 126/69)  BP(mean): --  RR: 17 (06 Jan 2019 08:26) (16 - 18)  SpO2: 100% (06 Jan 2019 08:26) (95% - 100%)    PHYSICAL EXAM:  General: awake no acute distress  Eyes:  anicteric, no conjunctival injection, no discharge  Oropharynx: no lesions or injection 	  Neck: supple, without adenopathy  Lungs: diminished at bases  Heart: regular rate and rhythm; no murmur, rubs or gallops  Abdomen: soft, nondistended, nontender, without mass or organomegaly  Blackman  Skin: no rashes  Extremities: contacted, R thigh old wound dry  Neurologic: awake, simple commands    LAB RESULTS:                        9.6    18.12 )-----------( 301      ( 06 Jan 2019 09:21 )             29.2   01-06    136  |  97  |  61<H>  ----------------------------<  130<H>  3.8   |  23  |  0.52    Ca    9.6      06 Jan 2019 07:41  Phos  2.9     01-06  Mg     1.9     01-06      MICROBIOLOGY:  RECENT CULTURES:  Culture - Other (01.01.19 @ 22:10)    -  Cefepime: S <=2    -  Cefoxitin: R >16    -  Aztreonam: S <=4    -  Cefazolin: R >16    -  Amikacin: S <=8    -  Amoxicillin/Clavulanic Acid: R >16/8    -  Ampicillin: R >16 These ampicillin results predict results for amoxicillin    -  Ampicillin: R >8 Predicts results to ampicillin/sulbactam, amoxacillin-clavulanate and  piperacillin-tazobactam.    -  Tobramycin: S <=2    -  Trimethoprim/Sulfamethoxazole: S <=0.5/9.5    -  Tetra/Doxy: R >8    -  Meropenem: S <=1    -  Piperacillin/Tazobactam: S <=8    -  Ciprofloxacin: S <=0.5    -  Ertapenem: S <=0.5    -  Imipenem: S <=1    -  Levofloxacin: S <=1    -  Gentamicin: S <=1    -  Ampicillin/Sulbactam: R >16/8    -  Ceftriaxone: R 8 Enterobacter, Citrobacter, and Serratia may develop resistance during prolonged therapy    -  Vancomycin: S 1    Specimen Source: Skin sacral decubitus ulcer    Culture Results:   Few Enterobacter cloacae/asburiae  Few Enterococcus faecium  Few Coag Negative Staphylococcus "Susceptibilities not performed"  Rare Yeast like cells    Organism Identification: Enterobacter cloacae/absuria  Enterococcus faecium    Organism: Enterobacter cloacae/absuria    Organism: Enterococcus faecium    Method Type: BLACNA    Method Type: BLANCA    Culture - Blood (01.01.19 @ 09:04)    Specimen Source: .Blood Blood    Culture Results:   No growth to date.    12-29 @ 02:43 .Blood Blood-Peripheral     No growth to date.    12-28 @ 08:59 .Urine Catheterized     >100,000 CFU/ml Yeast-like cells, presumptively not [place] albicans    RADIOLOGY REVIEWED:  VA Duplex Lower Ext Vein Scan, Right (01.03.19 @ 19:09) >  There is persistent thrombus in the right femoral, popliteal   and gastrocnemius veins. Resolution of thrombus is seen in the right   common femoral vein. The right posterior tibial and peroneal veins not   visualized.    < end of copied text >  CT Chest No Cont (12.31.18 @ 10:09) >  IMPRESSION:   No pneumonia.

## 2019-01-07 LAB
ANION GAP SERPL CALC-SCNC: 15 MMOL/L — SIGNIFICANT CHANGE UP (ref 5–17)
APPEARANCE UR: CLEAR — SIGNIFICANT CHANGE UP
BILIRUB UR-MCNC: NEGATIVE — SIGNIFICANT CHANGE UP
BUN SERPL-MCNC: 54 MG/DL — HIGH (ref 7–23)
CALCIUM SERPL-MCNC: 9.8 MG/DL — SIGNIFICANT CHANGE UP (ref 8.4–10.5)
CHLORIDE SERPL-SCNC: 97 MMOL/L — SIGNIFICANT CHANGE UP (ref 96–108)
CO2 SERPL-SCNC: 23 MMOL/L — SIGNIFICANT CHANGE UP (ref 22–31)
COLOR SPEC: YELLOW — SIGNIFICANT CHANGE UP
CREAT SERPL-MCNC: 0.45 MG/DL — LOW (ref 0.5–1.3)
DIFF PNL FLD: NEGATIVE — SIGNIFICANT CHANGE UP
GLUCOSE BLDC GLUCOMTR-MCNC: 146 MG/DL — HIGH (ref 70–99)
GLUCOSE BLDC GLUCOMTR-MCNC: 147 MG/DL — HIGH (ref 70–99)
GLUCOSE BLDC GLUCOMTR-MCNC: 153 MG/DL — HIGH (ref 70–99)
GLUCOSE BLDC GLUCOMTR-MCNC: 201 MG/DL — HIGH (ref 70–99)
GLUCOSE BLDC GLUCOMTR-MCNC: 203 MG/DL — HIGH (ref 70–99)
GLUCOSE SERPL-MCNC: 143 MG/DL — HIGH (ref 70–99)
GLUCOSE UR QL: NEGATIVE MG/DL — SIGNIFICANT CHANGE UP
HCT VFR BLD CALC: 28.1 % — LOW (ref 34.5–45)
HGB BLD-MCNC: 9.3 G/DL — LOW (ref 11.5–15.5)
KETONES UR-MCNC: NEGATIVE — SIGNIFICANT CHANGE UP
LEUKOCYTE ESTERASE UR-ACNC: ABNORMAL
MCHC RBC-ENTMCNC: 30.4 PG — SIGNIFICANT CHANGE UP (ref 27–34)
MCHC RBC-ENTMCNC: 33.1 GM/DL — SIGNIFICANT CHANGE UP (ref 32–36)
MCV RBC AUTO: 91.8 FL — SIGNIFICANT CHANGE UP (ref 80–100)
NITRITE UR-MCNC: NEGATIVE — SIGNIFICANT CHANGE UP
PH UR: 5.5 — SIGNIFICANT CHANGE UP (ref 5–8)
PLATELET # BLD AUTO: 332 K/UL — SIGNIFICANT CHANGE UP (ref 150–400)
POTASSIUM SERPL-MCNC: 4 MMOL/L — SIGNIFICANT CHANGE UP (ref 3.5–5.3)
POTASSIUM SERPL-SCNC: 4 MMOL/L — SIGNIFICANT CHANGE UP (ref 3.5–5.3)
PROT UR-MCNC: NEGATIVE MG/DL — SIGNIFICANT CHANGE UP
RBC # BLD: 3.06 M/UL — LOW (ref 3.8–5.2)
RBC # FLD: 15.3 % — HIGH (ref 10.3–14.5)
SODIUM SERPL-SCNC: 135 MMOL/L — SIGNIFICANT CHANGE UP (ref 135–145)
SP GR SPEC: 1.02 — SIGNIFICANT CHANGE UP (ref 1.01–1.02)
UROBILINOGEN FLD QL: NEGATIVE MG/DL — SIGNIFICANT CHANGE UP
WBC # BLD: 14.46 K/UL — HIGH (ref 3.8–10.5)
WBC # FLD AUTO: 14.46 K/UL — HIGH (ref 3.8–10.5)

## 2019-01-07 PROCEDURE — 99232 SBSQ HOSP IP/OBS MODERATE 35: CPT

## 2019-01-07 RX ADMIN — LISINOPRIL 5 MILLIGRAM(S): 2.5 TABLET ORAL at 06:31

## 2019-01-07 RX ADMIN — Medication 1 APPLICATION(S): at 16:30

## 2019-01-07 RX ADMIN — Medication 7.5 MILLIGRAM(S): at 06:32

## 2019-01-07 RX ADMIN — Medication 680 MILLIGRAM(S): at 17:31

## 2019-01-07 RX ADMIN — MINOCYCLINE HYDROCHLORIDE 100 MILLIGRAM(S): 45 TABLET, EXTENDED RELEASE ORAL at 17:04

## 2019-01-07 RX ADMIN — BUDESONIDE AND FORMOTEROL FUMARATE DIHYDRATE 2 PUFF(S): 160; 4.5 AEROSOL RESPIRATORY (INHALATION) at 08:26

## 2019-01-07 RX ADMIN — BUDESONIDE AND FORMOTEROL FUMARATE DIHYDRATE 2 PUFF(S): 160; 4.5 AEROSOL RESPIRATORY (INHALATION) at 22:50

## 2019-01-07 RX ADMIN — APIXABAN 5 MILLIGRAM(S): 2.5 TABLET, FILM COATED ORAL at 17:04

## 2019-01-07 RX ADMIN — MIRTAZAPINE 7.5 MILLIGRAM(S): 45 TABLET, ORALLY DISINTEGRATING ORAL at 22:55

## 2019-01-07 RX ADMIN — Medication 3 MILLILITER(S): at 13:05

## 2019-01-07 RX ADMIN — PANTOPRAZOLE SODIUM 40 MILLIGRAM(S): 20 TABLET, DELAYED RELEASE ORAL at 06:32

## 2019-01-07 RX ADMIN — TIZANIDINE 4 MILLIGRAM(S): 4 TABLET ORAL at 21:10

## 2019-01-07 RX ADMIN — GABAPENTIN 300 MILLIGRAM(S): 400 CAPSULE ORAL at 18:30

## 2019-01-07 RX ADMIN — APIXABAN 5 MILLIGRAM(S): 2.5 TABLET, FILM COATED ORAL at 06:31

## 2019-01-07 RX ADMIN — Medication 300 MILLIGRAM(S): at 13:05

## 2019-01-07 RX ADMIN — GABAPENTIN 300 MILLIGRAM(S): 400 CAPSULE ORAL at 06:32

## 2019-01-07 RX ADMIN — Medication 680 MILLIGRAM(S): at 01:13

## 2019-01-07 RX ADMIN — MINOCYCLINE HYDROCHLORIDE 100 MILLIGRAM(S): 45 TABLET, EXTENDED RELEASE ORAL at 06:31

## 2019-01-07 RX ADMIN — Medication 1: at 06:31

## 2019-01-07 RX ADMIN — Medication 1 APPLICATION(S): at 06:32

## 2019-01-07 RX ADMIN — SERTRALINE 25 MILLIGRAM(S): 25 TABLET, FILM COATED ORAL at 13:05

## 2019-01-07 RX ADMIN — QUETIAPINE FUMARATE 25 MILLIGRAM(S): 200 TABLET, FILM COATED ORAL at 22:55

## 2019-01-07 RX ADMIN — Medication 1 TABLET(S): at 13:05

## 2019-01-07 RX ADMIN — TIZANIDINE 4 MILLIGRAM(S): 4 TABLET ORAL at 11:25

## 2019-01-07 RX ADMIN — Medication 1 APPLICATION(S): at 13:22

## 2019-01-07 RX ADMIN — Medication 0.5 MILLIGRAM(S): at 22:54

## 2019-01-07 RX ADMIN — Medication 1 APPLICATION(S): at 06:33

## 2019-01-07 RX ADMIN — Medication 1 APPLICATION(S): at 22:49

## 2019-01-07 RX ADMIN — Medication 3 MILLILITER(S): at 17:04

## 2019-01-07 NOTE — PROGRESS NOTE ADULT - ASSESSMENT
Advanced dementia, right mrsa prosthetic hip infection, s/p leisa and spacer, on suppressive minocycline, recurrent aspiration events, chronic gavin completed tx for cre pseudomonas  s/p full course of Zerbaxa   History of UC, not active recently  Repeat ucx- yeast- colonizing vince, difficult to implicate as source of fever; even with such, non albicans candida would require course of IV tx  Persistent fevers even while on Zerbaxa  Known extensive DVT R LE- ?link to fever  Recent CT of C/A/P  with no defined infection   Microaspiration still possible  Sacral decubitus described as not infected- cx with typical colonizing vince  Tmx 100.1, along with leukocytosis, in this setting- more alert, hemodyn stable, no new findings, will continue to approach conservatively  Stable course  PLAN:  No indication for additional antibiotics other than suppressive minocycline  Hold antifungal Tx for now  Follow temps and CBC/diff   Approach as FUO  D/W daughter at bedside

## 2019-01-07 NOTE — CHART NOTE - NSCHARTNOTEFT_GEN_A_CORE
Nutrition Follow Up Note  Patient seen for: malnutrition follow-up and nutrition consult for reassessment of tube feeds.     Chart reviewed, events noted. 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 Remains with fever of unknown etiology.  reports pt has been working with PT.     Source: medical, record, RN,  at bedside, Pt non-verbal     Diet : NPO with enteral feeds      Enteral /Parenteral Nutrition: Vital AF @ 200ml q4hrs via PEG (Pt receiving total of 5 feeds per day 8am-10pm, feeds stopped over night ). Regimen provides 1000ml total volume, 1200kcal (28.5Kcal/Kg) and 75g (1.78g/Kg) protein and 811ml free water based on wt of 42.2Kg. Per RN, pt tolerating feeds well, received Banatrol this morning. No residuals noted. Last BM yesterday (1/6) 4x . Per discussion with  at bedside, he would like to discontinue 10pm feed given pt noted with BMs late at night causing her to be awoken. Pt previously noted with difficulty tolerating larger bolus volume at home however  amendable to increasing bolus volume in order to meet nutritional needs. Reports pt still with loose BMs particularly when pt is being changed, however notes some improvement with bolus regimen. Pt still receiving Banatrol TID however  unsure if it is helping given stools still loose. Unlikely tube feeds are causing loose BMs but will continue to monitor.     12/3: 124lbs --? accuracy  12/20: 91lbs   12/21: 92.3 lbs  12/27: 93 lbs   1/2: 92.8 lbs   1/7:  92.4lbs   Weight relatively stable now, will continue to monitor and adjust EN regimen as needed       Pertinent Medications: MEDICATIONS  (STANDING):  ALBUTerol/ipratropium for Nebulization 3 milliLiter(s) Nebulizer every 6 hours  apixaban 5 milliGRAM(s) Oral every 12 hours  AQUAPHOR (petrolatum Ointment) 1 Application(s) Topical three times a day  buDESOnide 160 MICROgram(s)/formoterol 4.5 MICROgram(s) Inhaler 2 Puff(s) Inhalation two times a day  clobetasol 0.05% Ointment 1 Application(s) Topical two times a day  clonazePAM Tablet 0.5 milliGRAM(s) Oral at bedtime  dextrose 5%. 1000 milliLiter(s) (50 mL/Hr) IV Continuous <Continuous>  dextrose 50% Injectable 12.5 Gram(s) IV Push once  dextrose 50% Injectable 25 Gram(s) IV Push once  dextrose 50% Injectable 25 Gram(s) IV Push once  fentaNYL   Patch  12 MICROgram(s)/Hr 1 Patch Transdermal every 72 hours  ferrous    sulfate Liquid 300 milliGRAM(s) Enteral Tube daily  gabapentin   Solution 300 milliGRAM(s) Oral two times a day  insulin lispro (HumaLOG) corrective regimen sliding scale   SubCutaneous every 6 hours  lactobacillus acidophilus 1 Tablet(s) Oral daily  lisinopril 5 milliGRAM(s) Oral daily  medical marijuana oil 1 milliLiter(s),medical marijuana oil 1 milliLiter(s),medical marijuana oil 1 milliLiter(s) 1 milliLiter(s) SubLingual <User Schedule>  minocycline 100 milliGRAM(s) Oral two times a day  mirtazapine 7.5 milliGRAM(s) Oral at bedtime  pantoprazole   Suspension 40 milliGRAM(s) Oral before breakfast  predniSONE  Solution 7.5 milliGRAM(s) Enteral Tube daily  QUEtiapine 25 milliGRAM(s) Oral at bedtime  sertraline 25 milliGRAM(s) Oral daily  sodium chloride 0.9%. 250 milliLiter(s) (25 mL/Hr) IV Continuous <Continuous>  sodium chloride 0.9%. 1000 milliLiter(s) (40 mL/Hr) IV Continuous <Continuous>  tiZANidine 4 milliGRAM(s) Oral <User Schedule>    MEDICATIONS  (PRN):  acetaminophen    Suspension .. 680 milliGRAM(s) Oral every 6 hours PRN Temp greater or equal to 38C (100.4F), Mild Pain (1 - 3)  dextrose 40% Gel 15 Gram(s) Oral once PRN Blood Glucose LESS THAN 70 milliGRAM(s)/deciliter  glucagon  Injectable 1 milliGRAM(s) IntraMuscular once PRN Glucose LESS THAN 70 milligrams/deciliter  ipratropium    for Nebulization 500 MICROGram(s) Nebulizer every 6 hours PRN Respiratory Distress  nystatin Powder 1 Application(s) Topical two times a day PRN rash/itchiness    Pertinent Labs: 01-07 @ 06:49: Na 135, BUN 54<H>, Cr 0.45<L>, <H>, K+ 4.0, Phos --, Mg --, Alk Phos --, ALT/SGPT --, AST/SGOT --, HbA1c --    Finger Sticks:  POCT Blood Glucose.: 153 mg/dL (01-07 @ 06:23)  POCT Blood Glucose.: 147 mg/dL (01-07 @ 00:46)  POCT Blood Glucose.: 159 mg/dL (01-06 @ 18:07)  POCT Blood Glucose.: 228 mg/dL (01-06 @ 11:57)      Skin per nursing documentation: right knee skin tear, unstageable sacrum, stage 2 lumbar spine   Edema: none     Estimated Needs:   [ x] no change since previous assessment  [ ] recalculated:     Previous Nutrition Diagnosis: increased nutrient needs + Severe Malnutrition   Nutrition Diagnosis is: on going     New Nutrition Diagnosis: N/A     Interventions:     Recommend  1) Recommend change bolus feeds to Vital 1.2 240ml q4hrs: 8am, 12pm, 4pm, 8pm (~4 cans total). Regimen to provide 960ml total volume, 1152Kcal, 72Gm protein/day and 754ml free water (27 Kcal/Kg and 1.7Gm/Kg based on current weight of 42Kg per day).   2) Recommend the addition of Iron once per day to promote wound healing. Provides an additional 90 Kcal and 14Gm amino acid per serving. EN regimen + Iron 1x per day (1242Kcal/Kg/ 29.5kcal/kg)    3) Monitor tolerance to EN regimen and adjust as needed.   4) Continue Banatrol TID in setting of loose BMs.     Monitoring and Evaluation:     Continue to monitor Nutritional intake, Tolerance to diet prescription, weights, labs, skin integrity    RD remains available upon request and will follow up per protocol  Shasha Hare RD, CDN, Pager # [phone] Nutrition Follow Up Note  Patient seen for: malnutrition follow-up and nutrition consult for reassessment of tube feeds.     Chart reviewed, events noted. 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 Remains with fever of unknown etiology.  reports pt has been working with PT.     Source: medical, record, RN,  at bedside, Pt non-verbal     Diet : NPO with enteral feeds      Enteral /Parenteral Nutrition: Vital AF @ 200ml q4hrs via PEG (Pt receiving total of 5 feeds per day 8am-10pm, feeds stopped over night ). Regimen provides 1000ml total volume, 1200kcal (28.5Kcal/Kg) and 75g (1.78g/Kg) protein and 811ml free water based on wt of 42.2Kg. Per RN, pt tolerating feeds well, received Banatrol this morning. No residuals noted. Last BM yesterday (1/6) 4x . Per discussion with  at bedside, he would like to discontinue 10pm feed given pt noted with BMs late at night causing her to be awoken. Pt previously noted with difficulty tolerating larger bolus volume at home however  amendable to increasing bolus volume in order to meet nutritional needs. Reports pt still with loose BMs particularly when pt is being changed, however notes some improvement with bolus regimen. Pt still receiving Banatrol TID however  unsure if it is helping given stools still loose. Unlikely tube feeds are causing loose BMs but will continue to monitor.     12/3: 124lbs --? accuracy  12/20: 91lbs   12/21: 92.3 lbs  12/27: 93 lbs   1/2: 92.8 lbs   1/7:  92.4lbs   Weight relatively stable now, will continue to monitor and adjust EN regimen as needed       Pertinent Medications: MEDICATIONS  (STANDING):  ALBUTerol/ipratropium for Nebulization 3 milliLiter(s) Nebulizer every 6 hours  apixaban 5 milliGRAM(s) Oral every 12 hours  AQUAPHOR (petrolatum Ointment) 1 Application(s) Topical three times a day  buDESOnide 160 MICROgram(s)/formoterol 4.5 MICROgram(s) Inhaler 2 Puff(s) Inhalation two times a day  clobetasol 0.05% Ointment 1 Application(s) Topical two times a day  clonazePAM Tablet 0.5 milliGRAM(s) Oral at bedtime  dextrose 5%. 1000 milliLiter(s) (50 mL/Hr) IV Continuous <Continuous>  dextrose 50% Injectable 12.5 Gram(s) IV Push once  dextrose 50% Injectable 25 Gram(s) IV Push once  dextrose 50% Injectable 25 Gram(s) IV Push once  fentaNYL   Patch  12 MICROgram(s)/Hr 1 Patch Transdermal every 72 hours  ferrous    sulfate Liquid 300 milliGRAM(s) Enteral Tube daily  gabapentin   Solution 300 milliGRAM(s) Oral two times a day  insulin lispro (HumaLOG) corrective regimen sliding scale   SubCutaneous every 6 hours  lactobacillus acidophilus 1 Tablet(s) Oral daily  lisinopril 5 milliGRAM(s) Oral daily  medical marijuana oil 1 milliLiter(s),medical marijuana oil 1 milliLiter(s),medical marijuana oil 1 milliLiter(s) 1 milliLiter(s) SubLingual <User Schedule>  minocycline 100 milliGRAM(s) Oral two times a day  mirtazapine 7.5 milliGRAM(s) Oral at bedtime  pantoprazole   Suspension 40 milliGRAM(s) Oral before breakfast  predniSONE  Solution 7.5 milliGRAM(s) Enteral Tube daily  QUEtiapine 25 milliGRAM(s) Oral at bedtime  sertraline 25 milliGRAM(s) Oral daily  sodium chloride 0.9%. 250 milliLiter(s) (25 mL/Hr) IV Continuous <Continuous>  sodium chloride 0.9%. 1000 milliLiter(s) (40 mL/Hr) IV Continuous <Continuous>  tiZANidine 4 milliGRAM(s) Oral <User Schedule>    MEDICATIONS  (PRN):  acetaminophen    Suspension .. 680 milliGRAM(s) Oral every 6 hours PRN Temp greater or equal to 38C (100.4F), Mild Pain (1 - 3)  dextrose 40% Gel 15 Gram(s) Oral once PRN Blood Glucose LESS THAN 70 milliGRAM(s)/deciliter  glucagon  Injectable 1 milliGRAM(s) IntraMuscular once PRN Glucose LESS THAN 70 milligrams/deciliter  ipratropium    for Nebulization 500 MICROGram(s) Nebulizer every 6 hours PRN Respiratory Distress  nystatin Powder 1 Application(s) Topical two times a day PRN rash/itchiness    Pertinent Labs: 01-07 @ 06:49: Na 135, BUN 54<H>, Cr 0.45<L>, <H>, K+ 4.0, Phos --, Mg --, Alk Phos --, ALT/SGPT --, AST/SGOT --, HbA1c --    Finger Sticks:  POCT Blood Glucose.: 153 mg/dL (01-07 @ 06:23)  POCT Blood Glucose.: 147 mg/dL (01-07 @ 00:46)  POCT Blood Glucose.: 159 mg/dL (01-06 @ 18:07)  POCT Blood Glucose.: 228 mg/dL (01-06 @ 11:57)      Skin per nursing documentation: right knee skin tear, unstageable sacrum, stage 2 lumbar spine   Edema: none     Estimated Needs:   [ x] no change since previous assessment  [ ] recalculated:     Previous Nutrition Diagnosis: increased nutrient needs + Severe Malnutrition   Nutrition Diagnosis is: on going     New Nutrition Diagnosis: N/A     Interventions:     Recommend  1) Recommend change bolus feeds to Vital 1.2 240ml q4hrs: 8am, 12pm, 4pm, 8pm (~4 cans total). Regimen to provide 960ml total volume, 1152Kcal, 72Gm protein/day and 754ml free water (27 Kcal/Kg and 1.7Gm/Kg based on current weight of 42Kg per day).   2) Recommend the addition of Iron once per day to promote wound healing. Provides an additional 90 Kcal and 14Gm amino acid per serving. EN regimen + Iron 1x per day (1242Kcal/Kg/ 29.5kcal/kg)    3) Monitor tolerance to EN regimen and adjust as needed.   4) Continue Banatrol TID in setting of loose BMs.   *discussed with NP Dat     Monitoring and Evaluation:     Continue to monitor Nutritional intake, Tolerance to diet prescription, weights, labs, skin integrity    RD remains available upon request and will follow up per protocol  Shasha Hare RD, CDN, Pager # [phone]

## 2019-01-07 NOTE — ADVANCED PRACTICE NURSE CONSULT - ASSESSMENT
The pts  was at bedside and visualized the wound. The pt was incontinent of liquid stool x 2 and pericare was provided. the sacral wound previously noted to be a deep tissue injury has continued to evolve. wound dimensions slightly increased since last assessment.  wound measures 5cmx 7cmx 0.2cm. tissue morphology as follows: 10% black tissue, 10% brown tissue, 40% yellow slough and 40% pink tissue. the wound is moist with scant serosanguinous drainage, There is no odor, the periwound skin presents with blanchable erythema, there is no induration or fluctuance. The sacral bone is protuberant. As the wound is still evolving, will continue with Medihoney paste to provide antibacterial activity and to  promote the autolytic debridement of the non-viable tissue.   Education provided to the pts  regarding wound status and purpose of tx used.   The pt remains on a [employer] P 500 support surface being turned and positioned. z-rey boots in place to off-load the heels. The pt receives bolus enteral feeds and is being followed by nutrition

## 2019-01-07 NOTE — PROGRESS NOTE ADULT - ASSESSMENT
fever  recultured need 24 hour afebril to dc . likely fever due to large clot burden and is steadily getting smaller    dvt continue eliquis and pt   chf contiue lisinopril  anxiety  dc zoloft completley continue other psychotropics

## 2019-01-07 NOTE — PROGRESS NOTE ADULT - ASSESSMENT
68 year-old woman with Alzheimer's dementia seen to have volume overload and biventricular congestive heart failure.  TTE earlier on this admission shows reduced LVEF, elevated pulmonary pressures.  Given severely reduced LVEF, plan to continue ACEi and uptitrate as blood pressure permits.   Will defer starting beta-blocker in this patient as she has baseline restrictive airway disease and is beta-agonist inhalers.  Continue anticoagulation with apixaban 5mg BID for patient with age < 80 years and creatinine < 1.5 mg/dL.    Patient with recurrent fevers of unclear etiology. RLE DVT could be etiology of fevers.  Leukocytosis noted, but it is improved since yesterday.    ID input appreciated.  Discharge planning per Dr. Bolden.

## 2019-01-07 NOTE — PROGRESS NOTE ADULT - SUBJECTIVE AND OBJECTIVE BOX
HPI:  Low grade fever this AM, but patient's , Caleb, at bedside, reports that patient has been improving clinically over the weekend, and that she has been working with PT.    Review Of Systems:      Unable to assess in the setting of advanced dementia    Medications:  acetaminophen    Suspension .. 680 milliGRAM(s) Oral every 6 hours PRN  ALBUTerol/ipratropium for Nebulization 3 milliLiter(s) Nebulizer every 6 hours  apixaban 5 milliGRAM(s) Oral every 12 hours  AQUAPHOR (petrolatum Ointment) 1 Application(s) Topical three times a day  buDESOnide 160 MICROgram(s)/formoterol 4.5 MICROgram(s) Inhaler 2 Puff(s) Inhalation two times a day  clobetasol 0.05% Ointment 1 Application(s) Topical two times a day  clonazePAM Tablet 0.5 milliGRAM(s) Oral at bedtime  dextrose 40% Gel 15 Gram(s) Oral once PRN  dextrose 5%. 1000 milliLiter(s) IV Continuous <Continuous>  dextrose 50% Injectable 12.5 Gram(s) IV Push once  dextrose 50% Injectable 25 Gram(s) IV Push once  dextrose 50% Injectable 25 Gram(s) IV Push once  fentaNYL   Patch  12 MICROgram(s)/Hr 1 Patch Transdermal every 72 hours  ferrous    sulfate Liquid 300 milliGRAM(s) Enteral Tube daily  gabapentin   Solution 300 milliGRAM(s) Oral two times a day  glucagon  Injectable 1 milliGRAM(s) IntraMuscular once PRN  insulin lispro (HumaLOG) corrective regimen sliding scale   SubCutaneous every 6 hours  ipratropium    for Nebulization 500 MICROGram(s) Nebulizer every 6 hours PRN  lactobacillus acidophilus 1 Tablet(s) Oral daily  lisinopril 5 milliGRAM(s) Oral daily  medical marijuana oil 1 milliLiter(s),medical marijuana oil 1 milliLiter(s),medical marijuana oil 1 milliLiter(s) 1 milliLiter(s) SubLingual <User Schedule>  minocycline 100 milliGRAM(s) Oral two times a day  mirtazapine 7.5 milliGRAM(s) Oral at bedtime  nystatin Powder 1 Application(s) Topical two times a day PRN  pantoprazole   Suspension 40 milliGRAM(s) Oral before breakfast  predniSONE  Solution 7.5 milliGRAM(s) Enteral Tube daily  QUEtiapine 25 milliGRAM(s) Oral at bedtime  sertraline 25 milliGRAM(s) Oral daily  sodium chloride 0.9%. 250 milliLiter(s) IV Continuous <Continuous>  sodium chloride 0.9%. 1000 milliLiter(s) IV Continuous <Continuous>  tiZANidine 4 milliGRAM(s) Oral <User Schedule>    PAST MEDICAL & SURGICAL HISTORY:  CVA (cerebral vascular accident)  Fatty pancreas  PNA (pneumonia)  Pulmonary HTN  IGT (impaired glucose tolerance)  Ulcerative colitis  Acid reflux  Anxiety  Depression  Mouth sores  HLD (hyperlipidemia)  Asthma  Humeral head fracture  H/O: hysterectomy  H/O cataract extraction, left  History of knee replacement    Vitals:  T(C): 36.9 (19 @ 08:16), Max: 38.3 (19 @ 00:48)  HR: 80 (19 @ 08:16) (80 - 99)  BP: 122/68 (19 @ 08:16) (116/69 - 142/79)  BP(mean): --  RR: 18 (19 @ 08:16) (17 - 18)  SpO2: 98% (19 @ 08:16) (98% - 100%)  Wt(kg): --  Daily     Daily Weight in k (2019 07:20)  I&O's Summary    2019 07:01  -  2019 07:00  --------------------------------------------------------  IN: 1540 mL / OUT: 900 mL / NET: 640 mL    2019 07:01  -  2019 09:47  --------------------------------------------------------  IN: 250 mL / OUT: 0 mL / NET: 250 mL        Physical Exam:  Appearance: appears older than stated age; bedbound, demented; no acute distress  Eyes: PERRL, EOMI, pink conjunctiva  HENT: Normal oral mucosa  Cardiovascular: RRR, S1, S2; no LE edema bilaterally; normal JVP  Respiratory: poor inspiratory effort  Gastrointestinal: soft, non-tender, non-distended with normal bowel sounds; +PEG  Musculoskeletal: Bedbound, contracted  Neurologic: cranial nerves grossly intact  Lymphatic: No lymphadenopathy  Psychiatry: awake and alert  Skin: No rashes, ecchymoses, or cyanosis                            9.3    14.46 )-----------( 332      ( 2019 07:58 )             28.1         135  |  97  |  54<H>  ----------------------------<  143<H>  4.0   |  23  |  0.45<L>    Ca    9.8      2019 06:49  Phos  2.9       Mg     1.9               Echo: < from: Transthoracic Echocardiogram (18 @ 11:23) >  ------------------------------------------------------------------------  Conclusions:  1. Mitral annular calcification, otherwise normal mitral  valve. Moderate-severe mitral regurgitation (vena contracta  0.8 cm)  2. Calcified trileafletaortic valve with normal opening.  Peak transaortic valve gradient equals 3 mm Hg, mean  transaortic valve gradient equals 1 mm Hg, aortic valve  velocity time integral equals 13 cm. Moderate aortic  regurgitation  3. Moderately dilated left atrium.LA volume index = 45  cc/m2.  4. Severe global left ventricular systolic dysfunction.  5. Increased E/e'  is consistent with elevated left  ventricular filling pressure.  6. Moderate right atrial enlargement. Inferior vena cava is  dilated (>=2.5cm) with normal respiratory variability  consistent with right atrial pressure 16-20mm Hg.  7. Right ventricular enlargement with decreased right  ventricular systolic function.  8. Normal tricuspid valve. Severe tricuspid regurgitation.  9. Estimated pulmonary artery systolic pressure equals 82  mm Hg, assuming right atrial pressure equals 8 mm Hg,  consistent with severe pulmonary pressures.  10. Color Doppler demonstrates evidence of left to right  shunt  ------------------------------------------------------------------------  Confirmed on  12/3/2018 - 16:37:07 by Margaret Khan M.D.  ------------------------------------------------------------------------    < end of copied text >    Imaging: < from: Xray Chest 1 View- PORTABLE-Urgent (18 @ 17:19) >  IMPRESSION:   Limited study due to patient positioning.  Trace right pleural effusion and likely left pleural effusion. Question   mild pulmonary edema.    HUAN JACOBS M.D., RADIOLOGY RESIDENT  This document has been electronically signed.  TONYA AARON M.D., ATTENDING RADIOLOGIST  This document has been electronically signed. Dec  2 2018  9:17PM      < end of copied text >    < from: [facility] Duplex Lower Ext Vein Scan, Bilat (12.15.18 @ 14:01) >  IMPRESSION:     Acute, above the knee deep venous thrombosis extending from the right   common femoral vein through the popliteal vein and into the right   gastrocnemius vein.    No left lower extremity DVT.    The above findings were discussed with BENJI Ryan by Dr. Tapia on   12/15/2018 2:43 PM, with read-back verification.    YELENA TAPIA M.D., RADIOLOGY RESIDENT  This document has been electronically signed.  PRIMO MURILLO M.D., ATTENDING RADIOLOGIST  This document has been electronically signed. Dec 15 2018  3:55PM        < end of copied text >    < from: [facility] Pulmonary Ventilation/Perfusion Scan (12.15.18 @ 10:13) >  FINDINGS: There is heterogeneous distribution of radiopharmaceutical in   both lungs on the ventilation and perfusion images. There are no   segmental perfusion defects in either lung. No significant interval   change since the previous study of 12/3/2018.    IMPRESSION: Very low probability of pulmonary embolus.    KRYSTYNA JULIAN M.D., CHIEF OF NUCLEAR MEDICINE  This document has been electronically signed. Dec 15 2018 10:15AM    < end of copied text >    < from: CT Abdomen and Pelvis No Cont (18 @ 15:14) >  FINDINGS:    LOWER CHEST: Cardiomegaly.    LIVER: Within normal limits.  BILE DUCTS: Normal caliber.  GALLBLADDER: Cholecystectomy.  SPLEEN: Within normal limits.  PANCREAS: Within normal limits.  ADRENALS: Within normal limits.  KIDNEYS/URETERS: Within normal limits.    BLADDER: Collapsed with a Blackman catheter.  REPRODUCTIVE ORGANS: Limited evaluation secondary to extensive streak   artifact from right lower extremity hardware    BOWEL: Gastrostomy balloon within the stomach. No bowel obstruction.   Appendix is not visualized.  PERITONEUM: No ascites.  VESSELS:  Atheromatous disease of the abdominal aorta.  RETROPERITONEUM: No lymphadenopathy.    ABDOMINAL WALL: Anasarca.  BONES: Degenerative changes of the spine. Complete vertebral body height   loss of L2. Total right hip arthroplasty. Chronic bilateral rib fractures   and left acetabular fracture. Mild anterolisthesis of L4 over L5,   unchanged compared to prior exam of 2018.    IMPRESSION:    No acute intra-abdominal pathology or collection.    AMIRA GUTIERREZ M.D., RADIOLOGY RESIDENT  This document has been electronically signed.  MARKEL SIMMONS M.D., ATTENDING RADIOLOGIST  This document has been electronically signed. Dec 22 2018  4:52PM    < end of copied text >      < from: CT Chest No Cont (18 @ 10:09) >  FINDINGS:    CHEST:     LUNGS AND LARGE AIRWAYS: Patent central airways. Left basilar atelectasis.  PLEURA: No pleural effusion.  VESSELS: Atheromatous disease of thethoracic aorta.  HEART: Cardiomegaly.No pericardial effusion. Aortic valve and coronary   artery calcifications.  MEDIASTINUM AND DILLAN: No lymphadenopathy.  CHEST WALL AND LOWER NECK: Within normal limits.  VISUALIZED UPPER ABDOMEN: Cholecystectomy.  BONES: Complete vertebral body height loss of L2. Chronic bilateral rib   fractures.    IMPRESSION:   No pneumonia.    AMIRA GUTIERREZ M.D., RADIOLOGY RESIDENT  This document has been electronically signed.  CHASITY BANERJEE M.D., ATTENDING RADIOLOGIST  This document has been electronically signed. Dec 31 2018 12:10PM    < end of copied text >    Interpretation of Telemetry: Patient no longer on tele

## 2019-01-07 NOTE — ADVANCED PRACTICE NURSE CONSULT - RECOMMEDATIONS
Will recommend the followin. Sacrum: Cavilon to periwound skin, Medihoney paste to the wound- follow with an Allevyn foam- change every 3 days and prn for soiling/drainage.  2. continue with turning and positioning  3. nutrition support as pt condition allows  Tx plan discussed with RN

## 2019-01-07 NOTE — CHART NOTE - NSCHARTNOTEFT_GEN_A_CORE
Called by RN that, pt was noted to have Temp of 100.9F [Orally] at 1248AM. 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 Per patients family patient w/ recent admission d/c on 11/19/18 for metabolic encephalopathy 2' complicated cystitis given chronic gavin catheter as well as aspiration PNA. She was d/c to home and per her family has had continued lethargy. Was seen by home PCP 5 days ago where labs and cxr was performed. CXR reported negative. Received call today that pro-bnp was elevated. Per family pt appears to be sob when laying flat and patient endorses sob as well.  Now noted to have Temp of100.9F.     # Sepsis Called by RN that, pt was noted to have Temp of 100.9F [Orally] at 1248AM. 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 Per patients family patient w/ recent admission d/c on 11/19/18 for metabolic encephalopathy 2' complicated cystitis given chronic gavin catheter as well as aspiration PNA. She was d/c to home and per her family has had continued lethargy. Was seen by home PCP 5 days ago where labs and cxr was performed. CXR reported negative. Received call today that pro-bnp was elevated. Per family pt appears to be sob when laying flat and patient endorses sob as well.  Now noted to have Temp of100.9F.     # FEVER.   - WBC: 18.12.   - f/u Lactate, Procalcitonin in am.   - f/u UA, BCX x2 SENT on 1/7.   - c/w Minocycline.   - c/w IVF.   - Tylenol prn fever.   - Cooling Measures.   - f/u ID Dr. Jimi ruiz.   - Right  MRSA prosthetic hip infection, s/p leisa and spacer, on suppressive minocycline.   - chronic gavin completed tx for cre pseudomonas.   - Recent CT of C/A/P  with no defined infection.   - Sacral decubitus described as not infected- cx with typical colonizing vince.   - As per ID, No indication for additional antibiotics other than suppressive minocycline  - Repeat ucx- yeast- colonizing vince, - Hold antifungal Tx for now as per ID.  - Will endorse to primary team in am.     DOROTHY. CHRISTINE DAY   # [id number]  Medicine PA. Called by RN that, pt was noted to have Temp of 100.9F [Orally] at 1248AM. 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 Per patients family patient w/ recent admission d/c on 11/19/18 for metabolic encephalopathy 2' complicated cystitis given chronic gavin catheter as well as aspiration PNA. She was d/c to home and per her family has had continued lethargy. Was seen by home PCP 5 days ago where labs and cxr was performed. CXR reported negative. Received call today that pro-bnp was elevated. Per family pt appears to be sob when laying flat and patient endorses sob as well.  Now noted to have Temp of100.9F.     # Fever.  - WBC: 18.12.   - f/u Lactate, Procalcitonin in am.   - f/u UA, BCX x2 SENT on 1/7.   - c/w Minocycline.   - c/w IVF.   - Tylenol prn fever.   - Cooling Measures.   - f/u ID Dr. Jimi ruiz.   - Right  MRSA prosthetic hip infection, s/p leisa and spacer, on suppressive minocycline.   - chronic gavin completed tx for cre pseudomonas.   - Recent CT of C/A/P  with no defined infection.   - Sacral decubitus described as not infected- cx with typical colonizing vince.   - As per ID, No indication for additional antibiotics other than suppressive minocycline  - Repeat ucx- yeast- colonizing vince, - Hold antifungal Tx for now as per ID.  - Will endorse to primary team in am.     DOROTHY. CHRISTINE DAY   # [id number]  Medicine PA.

## 2019-01-07 NOTE — PROGRESS NOTE ADULT - SUBJECTIVE AND OBJECTIVE BOX
---___---___---___---___---___---___ ---___---___---___---___---___---___---___---___---___---                    <<<  M E D I C A L   A T T E N D I N G    F O L L O W    U P   N O T E  >>>    fever 100.9 recultured again. wbc 18.9 yesterday  and trending down. awaiting hematology consult	       ---___---___---___---___---___---      <<<  MEDICATIONS:  >>>    MEDICATIONS  (STANDING):  ALBUTerol/ipratropium for Nebulization 3 milliLiter(s) Nebulizer every 6 hours  apixaban 5 milliGRAM(s) Oral every 12 hours  AQUAPHOR (petrolatum Ointment) 1 Application(s) Topical three times a day  buDESOnide 160 MICROgram(s)/formoterol 4.5 MICROgram(s) Inhaler 2 Puff(s) Inhalation two times a day  clobetasol 0.05% Ointment 1 Application(s) Topical two times a day  clonazePAM Tablet 0.5 milliGRAM(s) Oral at bedtime  dextrose 5%. 1000 milliLiter(s) (50 mL/Hr) IV Continuous <Continuous>  dextrose 50% Injectable 12.5 Gram(s) IV Push once  dextrose 50% Injectable 25 Gram(s) IV Push once  dextrose 50% Injectable 25 Gram(s) IV Push once  fentaNYL   Patch  12 MICROgram(s)/Hr 1 Patch Transdermal every 72 hours  ferrous    sulfate Liquid 300 milliGRAM(s) Enteral Tube daily  gabapentin   Solution 300 milliGRAM(s) Oral two times a day  insulin lispro (HumaLOG) corrective regimen sliding scale   SubCutaneous every 6 hours  lactobacillus acidophilus 1 Tablet(s) Oral daily  lisinopril 5 milliGRAM(s) Oral daily  medical marijuana oil 1 milliLiter(s),medical marijuana oil 1 milliLiter(s),medical marijuana oil 1 milliLiter(s) 1 milliLiter(s) SubLingual <User Schedule>  minocycline 100 milliGRAM(s) Oral two times a day  mirtazapine 7.5 milliGRAM(s) Oral at bedtime  pantoprazole   Suspension 40 milliGRAM(s) Oral before breakfast  predniSONE  Solution 7.5 milliGRAM(s) Enteral Tube daily  QUEtiapine 25 milliGRAM(s) Oral at bedtime  sertraline 25 milliGRAM(s) Oral daily  sodium chloride 0.9%. 250 milliLiter(s) (25 mL/Hr) IV Continuous <Continuous>  sodium chloride 0.9%. 1000 milliLiter(s) (40 mL/Hr) IV Continuous <Continuous>  tiZANidine 4 milliGRAM(s) Oral <User Schedule>      MEDICATIONS  (PRN):  acetaminophen    Suspension .. 680 milliGRAM(s) Oral every 6 hours PRN Temp greater or equal to 38C (100.4F), Mild Pain (1 - 3)  dextrose 40% Gel 15 Gram(s) Oral once PRN Blood Glucose LESS THAN 70 milliGRAM(s)/deciliter  glucagon  Injectable 1 milliGRAM(s) IntraMuscular once PRN Glucose LESS THAN 70 milligrams/deciliter  ipratropium    for Nebulization 500 MICROGram(s) Nebulizer every 6 hours PRN Respiratory Distress  nystatin Powder 1 Application(s) Topical two times a day PRN rash/itchiness       ---___---___---___---___---___---     <<<REVIEW OF SYSTEM: >>>    unable to assess    ---___---___---___---___---___---          <<<  VITAL SIGNS: >>>    T(F): 99.2 (19 @ 11:53), Max: 100.9 (19 @ 00:48)  HR: 84 (19 @ 11:53) (80 - 99)  BP: 99/61 (19 @ 11:53) (99/61 - 142/79)  RR: 18 (19 @ 11:53) (18 - 18)  SpO2: 98% (19 @ 11:53) (98% - 100%)  Wt(kg): --  CAPILLARY BLOOD GLUCOSE      POCT Blood Glucose.: 203 mg/dL (2019 13:23)    I&O's Summary    2019 07:01  -  2019 07:00  --------------------------------------------------------  IN: 1540 mL / OUT: 900 mL / NET: 640 mL    2019 07:01  -  2019 16:22  --------------------------------------------------------  IN: 250 mL / OUT: 500 mL / NET: -250 mL         ---___---___---___---___---___---                       PHYSICAL EXAM:    GEN: A&O X 3 , NAD , comfortable  HEENT: NCAT, PERRL, MMM, no scleral icterus, hearing intact  NECK: Supple, No JVD  CVS: S1S2 , regular , No M/R/G appreciated  PULM: CTA B/L,  no W/R/R appreciated  ABD.: soft. non tender, non distended,  bowel sounds present  Extrem: intact pulses , no edema noted  Derm: No rash or ecchymosis noted  PSYCH: normal mood, no depression, not anxious     ---___---___---___---___---___---     <<<  LAB AND IMAGING: >>>                          9.3    14.46 )-----------( 332      ( 2019 07:58 )             28.1               -    135  |  97  |  54<H>  ----------------------------<  143<H>  4.0   |  23  |  0.45<L>    Ca    9.8      2019 06:49  Phos  2.9       Mg     1.9     01-06             Urinalysis Basic - ( 2019 09:16 )    Color: Yellow / Appearance: Clear / S.019 / pH: x  Gluc: x / Ketone: Negative  / Bili: Negative / Urobili: Negative mg/dL   Blood: x / Protein: Negative mg/dL / Nitrite: Negative   Leuk Esterase: Moderate / RBC: 3 /HPF / WBC 50 /HPF   Sq Epi: x / Non Sq Epi: 1 /HPF / Bacteria: Negative                        [All pertinent / recent available Imaging reports and other labs reviewed]     ---___---___---___---___---___---___ ---___---___---___---___---           <<<  A S S E S S M E N T   A N D   P L A N :  >>>          -GI/DVT Prophylaxis.    --------------------------------------------  Case discussed with   Education given on     >>______________________<<      Deniz Bolden .         phone   [phone]

## 2019-01-07 NOTE — ADVANCED PRACTICE NURSE CONSULT - REASON FOR CONSULT
Requested by staff to re-evaluate pt a/w a sacral pressure ulcer.  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 Per the family, her breathing is normal (somewhat SOB at baseline). Her PMD 5 days ago got some blood tests sent off which resulted in an elevated proBNP. She was sent in for an evaluation. They deny fevers, chills, coughing, skin rashes. They have been feeding her via the PEG on time, and have attempted to flush with water as instructed. At times, when her residuals ate elevated, they would back off from the extra flushes. Vital signs in ED: HR 93, /85, RR 16, 95-97% RA   Past Medical History:  Acid reflux    Anxiety    Asthma    CVA (cerebral vascular accident)    Depression    Fatty pancreas    HLD (hyperlipidemia)    IGT (impaired glucose tolerance)    Mouth sores    PNA (pneumonia)    Pulmonary HTN    Ulcerative colitis.     Past Surgical History:  H/O cataract extraction, left    H/O: hysterectomy    History of knee replacement    Humeral head fracture.  The pt was last seen by the [facility] on 12/6

## 2019-01-07 NOTE — PROGRESS NOTE ADULT - SUBJECTIVE AND OBJECTIVE BOX
CC: f/u for fever    Patient reports: she is non verbal but is comfortable at present    REVIEW OF SYSTEMS:  All other review of systems negative (Comprehensive ROS)    Antimicrobials Day #  :  minocycline 100 milliGRAM(s) Oral two times a day    Other Medications Reviewed    T(F): 99.2 (19 @ 17:15), Max: 100.9 (19 @ 00:48)  HR: 85 (19 @ 17:15)  BP: 147/68 (19 @ 17:15)  RR: 18 (19 @ 17:15)  SpO2: 100% (19 @ 17:15)  Wt(kg): --    PHYSICAL EXAM:  General: alert, no acute distress, contracted  Eyes:  anicteric, no conjunctival injection, no discharge  Oropharynx: no lesions or injection 	  Neck: supple, without adenopathy  Lungs: clear to auscultation  Heart: regular rate and rhythm; no murmur, rubs or gallops  Abdomen: soft, nondistended, nontender, without mass or organomegaly  Skin: no lesions  Extremities: no clubbing, cyanosis, or edema  Neurologic: alert, contracted, not following commands    LAB RESULTS:                        9.3    14.46 )-----------( 332      ( 2019 07:58 )             28.1         135  |  97  |  54<H>  ----------------------------<  143<H>  4.0   |  23  |  0.45<L>    Ca    9.8      2019 06:49  Phos  2.9       Mg     1.9             Urinalysis Basic - ( 2019 09:16 )    Color: Yellow / Appearance: Clear / S.019 / pH: x  Gluc: x / Ketone: Negative  / Bili: Negative / Urobili: Negative mg/dL   Blood: x / Protein: Negative mg/dL / Nitrite: Negative   Leuk Esterase: Moderate / RBC: 3 /HPF / WBC 50 /HPF   Sq Epi: x / Non Sq Epi: 1 /HPF / Bacteria: Negative      MICROBIOLOGY:  RECENT CULTURES:   @ 01:08 .Blood Blood-Peripheral     No growth to date.          RADIOLOGY REVIEWED:

## 2019-01-08 LAB
GLUCOSE BLDC GLUCOMTR-MCNC: 106 MG/DL — HIGH (ref 70–99)
GLUCOSE BLDC GLUCOMTR-MCNC: 162 MG/DL — HIGH (ref 70–99)
GLUCOSE BLDC GLUCOMTR-MCNC: 229 MG/DL — HIGH (ref 70–99)
GLUCOSE BLDC GLUCOMTR-MCNC: 86 MG/DL — SIGNIFICANT CHANGE UP (ref 70–99)
MYELOPEROXIDASE AB SER-ACNC: <5 UNITS — SIGNIFICANT CHANGE UP
MYELOPEROXIDASE CELLS FLD QL: NEGATIVE — SIGNIFICANT CHANGE UP
PROTEINASE3 AB FLD-ACNC: <5 UNITS — SIGNIFICANT CHANGE UP
PROTEINASE3 AB SER-ACNC: NEGATIVE — SIGNIFICANT CHANGE UP

## 2019-01-08 PROCEDURE — 99231 SBSQ HOSP IP/OBS SF/LOW 25: CPT | Mod: GC

## 2019-01-08 RX ORDER — HUMAN INSULIN 100 [IU]/ML
2 INJECTION, SUSPENSION SUBCUTANEOUS
Qty: 0 | Refills: 0 | Status: DISCONTINUED | OUTPATIENT
Start: 2019-01-08 | End: 2019-01-12

## 2019-01-08 RX ADMIN — Medication 1 APPLICATION(S): at 06:49

## 2019-01-08 RX ADMIN — Medication 1 TABLET(S): at 11:24

## 2019-01-08 RX ADMIN — MIRTAZAPINE 7.5 MILLIGRAM(S): 45 TABLET, ORALLY DISINTEGRATING ORAL at 22:31

## 2019-01-08 RX ADMIN — Medication 3 MILLILITER(S): at 11:24

## 2019-01-08 RX ADMIN — PANTOPRAZOLE SODIUM 40 MILLIGRAM(S): 20 TABLET, DELAYED RELEASE ORAL at 06:49

## 2019-01-08 RX ADMIN — APIXABAN 5 MILLIGRAM(S): 2.5 TABLET, FILM COATED ORAL at 17:59

## 2019-01-08 RX ADMIN — BUDESONIDE AND FORMOTEROL FUMARATE DIHYDRATE 2 PUFF(S): 160; 4.5 AEROSOL RESPIRATORY (INHALATION) at 09:47

## 2019-01-08 RX ADMIN — Medication 7.5 MILLIGRAM(S): at 06:51

## 2019-01-08 RX ADMIN — MINOCYCLINE HYDROCHLORIDE 100 MILLIGRAM(S): 45 TABLET, EXTENDED RELEASE ORAL at 06:49

## 2019-01-08 RX ADMIN — FENTANYL CITRATE 1 PATCH: 50 INJECTION INTRAVENOUS at 08:00

## 2019-01-08 RX ADMIN — Medication 0.5 MILLIGRAM(S): at 22:31

## 2019-01-08 RX ADMIN — Medication 1 APPLICATION(S): at 06:51

## 2019-01-08 RX ADMIN — Medication 3 MILLILITER(S): at 17:59

## 2019-01-08 RX ADMIN — FENTANYL CITRATE 1 PATCH: 50 INJECTION INTRAVENOUS at 07:45

## 2019-01-08 RX ADMIN — FENTANYL CITRATE 1 PATCH: 50 INJECTION INTRAVENOUS at 23:58

## 2019-01-08 RX ADMIN — QUETIAPINE FUMARATE 25 MILLIGRAM(S): 200 TABLET, FILM COATED ORAL at 22:31

## 2019-01-08 RX ADMIN — TIZANIDINE 4 MILLIGRAM(S): 4 TABLET ORAL at 09:46

## 2019-01-08 RX ADMIN — LISINOPRIL 5 MILLIGRAM(S): 2.5 TABLET ORAL at 06:49

## 2019-01-08 RX ADMIN — Medication 300 MILLIGRAM(S): at 11:23

## 2019-01-08 RX ADMIN — Medication 500 MICROGRAM(S): at 11:23

## 2019-01-08 RX ADMIN — Medication 1 APPLICATION(S): at 15:09

## 2019-01-08 RX ADMIN — Medication 2: at 12:52

## 2019-01-08 RX ADMIN — Medication 1: at 06:50

## 2019-01-08 RX ADMIN — GABAPENTIN 300 MILLIGRAM(S): 400 CAPSULE ORAL at 11:22

## 2019-01-08 RX ADMIN — APIXABAN 5 MILLIGRAM(S): 2.5 TABLET, FILM COATED ORAL at 06:49

## 2019-01-08 RX ADMIN — Medication 3 MILLILITER(S): at 06:51

## 2019-01-08 RX ADMIN — TIZANIDINE 4 MILLIGRAM(S): 4 TABLET ORAL at 20:59

## 2019-01-08 NOTE — PROGRESS NOTE ADULT - ASSESSMENT
68F with advanced dementia (dysphagia with PEG tube, bedbound, chronic gavin), sacral pressure ulcer stage 3, chronic steroids for asthma, right prosthetic hip MRSA infection in 07/2018 s/p spacer in place on oral Minocycline, past episodes of UTI and aspiration pneumonia who originally presented with CHF, found to have acute DVT and UTI (pseudomonas), currently with persistent fever.    Pt currently with resolving DVT, and improving fevers curves, which is most likely cause of fevers  Pt is on minocycline, and given positive serology of PIOTR, centromere, pt should be screened for drug induced lupus  Pt does have pulmonary hypertension, Raynauds symptoms, and positive centromere. However, even if pt has CREST, this condition doesn't usually lead to fevers.   APLS serology negative.   Pt on chronic steroids, and high risk of osteoporosis. Will need outpat work up/tx  However, pt has advance dementia, bedbound, sacral decubitus ulcers, w severe dysphagia requiring PEG tube; prognosis is guarded, and family should be reapproached about palliative services    Recommend  Please obtain antihistone, ANCA serology, DsDNA to screen for drug induced lupus  Would have repeat family discussion regarding GOC and prognosis and possibility of having patient benefit from palliative services    Kartik Warren MD PGY4  [phone]

## 2019-01-08 NOTE — PROGRESS NOTE ADULT - ASSESSMENT
fever   dvt   depression   anxiety   asthma   h/o cva      continue eliquis   contiue remeron and seroqul and klonopin   follow cbc and fever   id saw the patient   elevated glucose on insulin       will get stool for c diff

## 2019-01-08 NOTE — PROGRESS NOTE ADULT - SUBJECTIVE AND OBJECTIVE BOX
CC: f/u for fever    Patient reports  nothing intelligible    REVIEW OF SYSTEMS:  All other review of systems cannot get (Comprehensive ROS)    Antimicrobials Day #  :    Other Medications Reviewed    T(F): 98.4 (19 @ 14:45), Max: 100.1 (19 @ 21:11)  HR: 92 (19 @ 14:45)  BP: 146/77 (19 @ 14:45)  RR: 18 (19 @ 14:45)  SpO2: 95% (19 @ 14:45)  Wt(kg): --    PHYSICAL EXAM:  General: alert, no acute distress  Eyes:  anicteric, no conjunctival injection, no discharge  Oropharynx: no lesions or injection 	  Neck: supple, without adenopathy  Lungs: clear to auscultation  Heart: regular rate and rhythm; no murmur, rubs or gallops  Abdomen: soft, nondistended, nontender, without mass or organomegaly, peg  Skin: no lesions  Extremities: no clubbing, cyanosis,. some leg  edema  Neurologic: alert, confused, curled up in bed, moves all extremities    LAB RESULTS:                        9.3    14.46 )-----------( 332      ( 2019 07:58 )             28.1         135  |  97  |  54<H>  ----------------------------<  143<H>  4.0   |  23  |  0.45<L>    Ca    9.8      2019 06:49        Urinalysis Basic - ( 2019 09:16 )    Color: Yellow / Appearance: Clear / S.019 / pH: x  Gluc: x / Ketone: Negative  / Bili: Negative / Urobili: Negative mg/dL   Blood: x / Protein: Negative mg/dL / Nitrite: Negative   Leuk Esterase: Moderate / RBC: 3 /HPF / WBC 50 /HPF   Sq Epi: x / Non Sq Epi: 1 /HPF / Bacteria: Negative      MICROBIOLOGY:  RECENT CULTURES:   @ 08:36 .Blood Blood-Peripheral     No growth to date.       @ 01:08 .Blood Blood-Peripheral     No growth to date.          RADIOLOGY REVIEWED:      < from: CT Chest No Cont (18 @ 10:09) >  IMPRESSION:   No pneumonia.      < end of copied text >    < from: [facility] Duplex Lower Ext Vein Scan, Right (19 @ 19:09) >  Impression: There is persistent thrombus in the right femoral, popliteal   and gastrocnemius veins. Resolution of thrombus is seen in the right   common femoral vein. The right posterior tibial and peroneal veins not   visualized.    < end of copied text >        Assessment:  Patient with advanced dementia, prolonged debility after right thr infection with mrsa bacteremia, s/p peg, urinary retention, admitted multiple weeks ago with fever, tx for cre pseudomonas, fever persists, dvt found, on anticoagulation and dvt much better but still febrile. No infection found. Possible drug induced lupus so will stop minocycline and zoloft stopped too.   Plan:  will stop minocycline  see if fever resolves   monitor off antibiotics

## 2019-01-08 NOTE — PROGRESS NOTE ADULT - SUBJECTIVE AND OBJECTIVE BOX
---___---___---___---___---___---___ ---___---___---___---___---___---___---___---___---___---                    <<<  M E D I C A L   A T T E N D I N G    F O L L O W    U P   N O T E  >>>    still with low grade fever. meds are being gradually  to rule ot drug induced lupus   ---___---___---___---___---___---      <<<  MEDICATIONS:  >>>    MEDICATIONS  (STANDING):  ALBUTerol/ipratropium for Nebulization 3 milliLiter(s) Nebulizer every 6 hours  apixaban 5 milliGRAM(s) Oral every 12 hours  AQUAPHOR (petrolatum Ointment) 1 Application(s) Topical three times a day  buDESOnide 160 MICROgram(s)/formoterol 4.5 MICROgram(s) Inhaler 2 Puff(s) Inhalation two times a day  clobetasol 0.05% Ointment 1 Application(s) Topical two times a day  clonazePAM Tablet 0.5 milliGRAM(s) Oral at bedtime  dextrose 5%. 1000 milliLiter(s) (50 mL/Hr) IV Continuous <Continuous>  dextrose 50% Injectable 12.5 Gram(s) IV Push once  dextrose 50% Injectable 25 Gram(s) IV Push once  dextrose 50% Injectable 25 Gram(s) IV Push once  fentaNYL   Patch  12 MICROgram(s)/Hr 1 Patch Transdermal every 72 hours  ferrous    sulfate Liquid 300 milliGRAM(s) Enteral Tube daily  gabapentin   Solution 300 milliGRAM(s) Oral two times a day  insulin lispro (HumaLOG) corrective regimen sliding scale   SubCutaneous every 6 hours  insulin NPH human recombinant 2 Unit(s) SubCutaneous <User Schedule>  lactobacillus acidophilus 1 Tablet(s) Oral daily  lisinopril 5 milliGRAM(s) Oral daily  medical marijuana oil 1 milliLiter(s),medical marijuana oil 1 milliLiter(s),medical marijuana oil 1 milliLiter(s) 1 milliLiter(s) SubLingual <User Schedule>  minocycline 100 milliGRAM(s) Oral two times a day  mirtazapine 7.5 milliGRAM(s) Oral at bedtime  pantoprazole   Suspension 40 milliGRAM(s) Oral before breakfast  predniSONE  Solution 7.5 milliGRAM(s) Enteral Tube daily  QUEtiapine 25 milliGRAM(s) Oral at bedtime  sodium chloride 0.9%. 250 milliLiter(s) (25 mL/Hr) IV Continuous <Continuous>  sodium chloride 0.9%. 1000 milliLiter(s) (40 mL/Hr) IV Continuous <Continuous>  tiZANidine 4 milliGRAM(s) Oral <User Schedule>      MEDICATIONS  (PRN):  acetaminophen    Suspension .. 680 milliGRAM(s) Oral every 6 hours PRN Temp greater or equal to 38C (100.4F), Mild Pain (1 - 3)  dextrose 40% Gel 15 Gram(s) Oral once PRN Blood Glucose LESS THAN 70 milliGRAM(s)/deciliter  glucagon  Injectable 1 milliGRAM(s) IntraMuscular once PRN Glucose LESS THAN 70 milligrams/deciliter  ipratropium    for Nebulization 500 MICROGram(s) Nebulizer every 6 hours PRN Respiratory Distress  nystatin Powder 1 Application(s) Topical two times a day PRN rash/itchiness       ---___---___---___---___---___---     <<<REVIEW OF SYSTEM: >>>        ---___---___---___---___---___---          <<<  VITAL SIGNS: >>>    T(F): 98.4 (19 @ 14:45), Max: 100.1 (19 @ 21:11)  HR: 92 (19 @ 14:45) (81 - 92)  BP: 146/77 (19 @ 14:45) (116/63 - 147/68)  RR: 18 (19 @ 14:45) (18 - 19)  SpO2: 95% (19 @ 14:45) (95% - 100%)  Wt(kg): --  CAPILLARY BLOOD GLUCOSE      POCT Blood Glucose.: 229 mg/dL (2019 12:06)    I&O's Summary    2019 07:01  -  2019 07:00  --------------------------------------------------------  IN: 1070 mL / OUT: 800 mL / NET: 270 mL    2019 07:  -  2019 16:41  --------------------------------------------------------  IN: 0 mL / OUT: 0 mL / NET: 0 mL         ---___---___---___---___---___---                       PHYSICAL EXAM:    GEN: A&O X 0 , NAD , comfortable  HEENT: NCAT, PERRL, MMM, no scleral icterus, hearing intact  NECK: Supple, No JVD  CVS: S1S2 , regular , No M/R/G appreciated  PULM: CTA B/L,  no W/R/R appreciated  ABD.: soft. non tender, non distended,  bowel sounds present soft nt nd   Extrem: intact pulses , no edema noted  Derm: No rash or ecchymosis noted        ---___---___---___---___---___---     <<<  LAB AND IMAGING: >>>                          9.3    14.46 )-----------( 332      ( 2019 07:58 )             28.1               -07    135  |  97  |  54<H>  ----------------------------<  143<H>  4.0   |  23  |  0.45<L>    Ca    9.8      2019 06:49             Urinalysis Basic - ( 2019 09:16 )    Color: Yellow / Appearance: Clear / S.019 / pH: x  Gluc: x / Ketone: Negative  / Bili: Negative / Urobili: Negative mg/dL   Blood: x / Protein: Negative mg/dL / Nitrite: Negative   Leuk Esterase: Moderate / RBC: 3 /HPF / WBC 50 /HPF   Sq Epi: x / Non Sq Epi: 1 /HPF / Bacteria: Negative                        [All pertinent / recent available Imaging reports and other labs reviewed]     ---___---___---___---___---___---___ ---___---___---___---___---           <<<  A S S E S S M E N T   A N D   P L A N :  >>>          -GI/DVT Prophylaxis.    --------------------------------------------  Case discussed with   Education given on     >>______________________<<      Deniz Bolden .         phone   [phone]

## 2019-01-08 NOTE — PROGRESS NOTE ADULT - SUBJECTIVE AND OBJECTIVE BOX
MYRIAM HEATH  [id number]    INTERVAL HPI/OVERNIGHT EVENTS:    Pt complaining of b/l leg pain. Also slightly agitated, wants to be changed. Fever curve improving.     MEDICATIONS  (STANDING):  ALBUTerol/ipratropium for Nebulization 3 milliLiter(s) Nebulizer every 6 hours  apixaban 5 milliGRAM(s) Oral every 12 hours  AQUAPHOR (petrolatum Ointment) 1 Application(s) Topical three times a day  buDESOnide 160 MICROgram(s)/formoterol 4.5 MICROgram(s) Inhaler 2 Puff(s) Inhalation two times a day  clobetasol 0.05% Ointment 1 Application(s) Topical two times a day  clonazePAM Tablet 0.5 milliGRAM(s) Oral at bedtime  dextrose 5%. 1000 milliLiter(s) (50 mL/Hr) IV Continuous <Continuous>  dextrose 50% Injectable 12.5 Gram(s) IV Push once  dextrose 50% Injectable 25 Gram(s) IV Push once  dextrose 50% Injectable 25 Gram(s) IV Push once  fentaNYL   Patch  12 MICROgram(s)/Hr 1 Patch Transdermal every 72 hours  ferrous    sulfate Liquid 300 milliGRAM(s) Enteral Tube daily  gabapentin   Solution 300 milliGRAM(s) Oral two times a day  insulin lispro (HumaLOG) corrective regimen sliding scale   SubCutaneous every 6 hours  lactobacillus acidophilus 1 Tablet(s) Oral daily  lisinopril 5 milliGRAM(s) Oral daily  medical marijuana oil 1 milliLiter(s),medical marijuana oil 1 milliLiter(s),medical marijuana oil 1 milliLiter(s) 1 milliLiter(s) SubLingual <User Schedule>  minocycline 100 milliGRAM(s) Oral two times a day  mirtazapine 7.5 milliGRAM(s) Oral at bedtime  pantoprazole   Suspension 40 milliGRAM(s) Oral before breakfast  predniSONE  Solution 7.5 milliGRAM(s) Enteral Tube daily  QUEtiapine 25 milliGRAM(s) Oral at bedtime  sodium chloride 0.9%. 250 milliLiter(s) (25 mL/Hr) IV Continuous <Continuous>  sodium chloride 0.9%. 1000 milliLiter(s) (40 mL/Hr) IV Continuous <Continuous>  tiZANidine 4 milliGRAM(s) Oral <User Schedule>    MEDICATIONS  (PRN):  acetaminophen    Suspension .. 680 milliGRAM(s) Oral every 6 hours PRN Temp greater or equal to 38C (100.4F), Mild Pain (1 - 3)  dextrose 40% Gel 15 Gram(s) Oral once PRN Blood Glucose LESS THAN 70 milliGRAM(s)/deciliter  glucagon  Injectable 1 milliGRAM(s) IntraMuscular once PRN Glucose LESS THAN 70 milligrams/deciliter  ipratropium    for Nebulization 500 MICROGram(s) Nebulizer every 6 hours PRN Respiratory Distress  nystatin Powder 1 Application(s) Topical two times a day PRN rash/itchiness      Allergies    ASA; dye contrast (Anaphylaxis)  aspirin (Short breath)  divalproex sodium (Other (Unknown))  Haldol (Other (Unknown))  penicillin (Short breath; Rash)  sulfa drugs (Short breath; Rash)  vancomycin (Rash; Urticaria; Hives)  Xanax (Other (Unknown))    Intolerances        Review of Systems:   General: +fevers  HEENT: No blurry vision, dysphagia, or odynophagia  CVS: No CP/palpitations  Resp: No SOB/wheezing  GI: No N/V/C/D/abdominal pain  MSK: per HPI   Skin: No new rashes  Neuro: No headaches      Vital Signs Last 24 Hrs  T(C): 36.8 (2019 06:35), Max: 37.8 (2019 21:11)  T(F): 98.3 (2019 06:35), Max: 100.1 (2019 21:11)  HR: 83 (2019 06:35) (81 - 89)  BP: 143/72 (2019 06:35) (99/61 - 147/68)  BP(mean): --  RR: 19 (2019 06:35) (18 - 19)  SpO2: 98% (2019 06:35) (98% - 100%)    Physical Exam:  General: cachectic  HEENT: dry oropharynx  Cardio: +S1/S2, RRR  Resp: CTA b/l, no w,c  GI: +BS, soft, NT/ND, PEG tube site c/d/i   MSK: no synovitis on exam, knees contracted   Neuro: AAOx0      LABS:                        9.3    14.46 )-----------( 332      ( 2019 07:58 )             28.1     01-07    135  |  97  |  54<H>  ----------------------------<  143<H>  4.0   |  23  |  0.45<L>    Ca    9.8      2019 06:49    Anti-Nuclear Antibody (18 @ 14:47)    PIOTR Pattern: Centromere    Centromere Antibody (18 @ 16:49)    Centromere Antibody: >8.0: Fluorescent Bead Immunoassay    Scleroderma Antibodies (18 @ 16:49)    Scleroderma Antibodies: <0.2: Fluorescent Bead Immunoassay                   Beta 2 Glycoprotein 1 Antibody Screen (18 @ 14:47)    Beta 2 Glycoprotein 1 Antibody Screen: Negative    Dilute Dionisio&#x27;s Viper Venom Time (18 @ 14:47)    DRVVT Inhibitor Screen: 43.3: The presence of direct thrombin inhibitors (such as argatroban,  refludan), or direct  Xa inhibitors (such as fondaparinux)  may cause  false positive results. sec    DRVVT 50/50: 36.6 sec    DRVVT S/C Ratio: LA NEG    Anticardiolipin Antibody Level, Total (18 @ 14:47)    Anticardiolipin Antibody Level, Total: Negative: Method: EIA                                Urinalysis Basic - ( 2019 09:16 )    Color: Yellow / Appearance: Clear / S.019 / pH: x  Gluc: x / Ketone: Negative  / Bili: Negative / Urobili: Negative mg/dL   Blood: x / Protein: Negative mg/dL / Nitrite: Negative   Leuk Esterase: Moderate / RBC: 3 /HPF / WBC 50 /HPF   Sq Epi: x / Non Sq Epi: 1 /HPF / Bacteria: Negative          RADIOLOGY & ADDITIONAL TESTS:

## 2019-01-08 NOTE — CHART NOTE - NSCHARTNOTEFT_GEN_A_CORE
Patient seen for nutrition consult for reassessment of tube feeds     Pt currently receiving  Vital 1.2 240ml q4hrs (~4 cans total). Regimen to provide 960ml total volume, 1152Kcal, 72Gm protein/day and 754ml free water (27 Kcal/Kg and 1.7Gm/Kg based on current weight of 42Kg per day) + Iron 1x per day.  reports pt still with lose stools (4 x today per ) would like to change tube feed formula, requesting Glucerna as pt had received it in the past. Wants to keep pt on bolus feeds as pt appears to tolerate bolus over continuous feeds better (reports less spasms).     1. Recommend trial of Glucerna 1.2 240ml q4 hrs: 8am, 12pm, 4pm, 8pm (4 cans total). Regimen to provide 960ml total volume, 1152Kcal, 57.6Gm protein/day and 773ml free water (27 Kcal/Kg and 1.4Gm/Kg based on weight of 42Kg per day).   2. Recommend Iron once per day (provides 90 Kcal and 14 Gm amino acids per serving).   3. Monitor tolerance to EN and adjust as needed.   4. Continue Banatrol in setting of loose BMs.     Discussed with NP.

## 2019-01-08 NOTE — PROGRESS NOTE ADULT - ATTENDING COMMENTS
patient seen and examined by me personally      agree with above     no clear indication there is an auto-immune d/o which is responsible for her fevers despite the serologies which are positive.  currently w/ persistent diarrhea,  is concerned this is 2/2 the type of feeds she is getting and a new food source is pending     will follow remaining pending labs   recall as needed

## 2019-01-09 LAB
ANION GAP SERPL CALC-SCNC: 15 MMOL/L — SIGNIFICANT CHANGE UP (ref 5–17)
AUTO DIFF PNL BLD: NEGATIVE — SIGNIFICANT CHANGE UP
BUN SERPL-MCNC: 42 MG/DL — HIGH (ref 7–23)
C-ANCA SER-ACNC: NEGATIVE — SIGNIFICANT CHANGE UP
CALCIUM SERPL-MCNC: 10.1 MG/DL — SIGNIFICANT CHANGE UP (ref 8.4–10.5)
CHLORIDE SERPL-SCNC: 99 MMOL/L — SIGNIFICANT CHANGE UP (ref 96–108)
CO2 SERPL-SCNC: 24 MMOL/L — SIGNIFICANT CHANGE UP (ref 22–31)
CREAT SERPL-MCNC: 0.47 MG/DL — LOW (ref 0.5–1.3)
CULTURE RESULTS: SIGNIFICANT CHANGE UP
CULTURE RESULTS: SIGNIFICANT CHANGE UP
GLUCOSE BLDC GLUCOMTR-MCNC: 115 MG/DL — HIGH (ref 70–99)
GLUCOSE BLDC GLUCOMTR-MCNC: 119 MG/DL — HIGH (ref 70–99)
GLUCOSE BLDC GLUCOMTR-MCNC: 168 MG/DL — HIGH (ref 70–99)
GLUCOSE BLDC GLUCOMTR-MCNC: 86 MG/DL — SIGNIFICANT CHANGE UP (ref 70–99)
GLUCOSE SERPL-MCNC: 115 MG/DL — HIGH (ref 70–99)
HCT VFR BLD CALC: 28.4 % — LOW (ref 34.5–45)
HGB BLD-MCNC: 9.3 G/DL — LOW (ref 11.5–15.5)
MCHC RBC-ENTMCNC: 30.3 PG — SIGNIFICANT CHANGE UP (ref 27–34)
MCHC RBC-ENTMCNC: 32.7 GM/DL — SIGNIFICANT CHANGE UP (ref 32–36)
MCV RBC AUTO: 92.5 FL — SIGNIFICANT CHANGE UP (ref 80–100)
P-ANCA SER-ACNC: NEGATIVE — SIGNIFICANT CHANGE UP
PLATELET # BLD AUTO: 396 K/UL — SIGNIFICANT CHANGE UP (ref 150–400)
POTASSIUM SERPL-MCNC: 4.3 MMOL/L — SIGNIFICANT CHANGE UP (ref 3.5–5.3)
POTASSIUM SERPL-SCNC: 4.3 MMOL/L — SIGNIFICANT CHANGE UP (ref 3.5–5.3)
RBC # BLD: 3.07 M/UL — LOW (ref 3.8–5.2)
RBC # FLD: 15.1 % — HIGH (ref 10.3–14.5)
SODIUM SERPL-SCNC: 138 MMOL/L — SIGNIFICANT CHANGE UP (ref 135–145)
SPECIMEN SOURCE: SIGNIFICANT CHANGE UP
SPECIMEN SOURCE: SIGNIFICANT CHANGE UP
WBC # BLD: 14.36 K/UL — HIGH (ref 3.8–10.5)
WBC # FLD AUTO: 14.36 K/UL — HIGH (ref 3.8–10.5)

## 2019-01-09 RX ADMIN — TIZANIDINE 4 MILLIGRAM(S): 4 TABLET ORAL at 21:36

## 2019-01-09 RX ADMIN — Medication 1 APPLICATION(S): at 07:08

## 2019-01-09 RX ADMIN — Medication 0.5 MILLIGRAM(S): at 21:36

## 2019-01-09 RX ADMIN — LISINOPRIL 5 MILLIGRAM(S): 2.5 TABLET ORAL at 06:53

## 2019-01-09 RX ADMIN — FENTANYL CITRATE 1 PATCH: 50 INJECTION INTRAVENOUS at 12:16

## 2019-01-09 RX ADMIN — Medication 7.5 MILLIGRAM(S): at 06:54

## 2019-01-09 RX ADMIN — Medication 1 APPLICATION(S): at 21:37

## 2019-01-09 RX ADMIN — BUDESONIDE AND FORMOTEROL FUMARATE DIHYDRATE 2 PUFF(S): 160; 4.5 AEROSOL RESPIRATORY (INHALATION) at 08:53

## 2019-01-09 RX ADMIN — Medication 1 TABLET(S): at 15:04

## 2019-01-09 RX ADMIN — MIRTAZAPINE 7.5 MILLIGRAM(S): 45 TABLET, ORALLY DISINTEGRATING ORAL at 21:36

## 2019-01-09 RX ADMIN — Medication 1 APPLICATION(S): at 15:02

## 2019-01-09 RX ADMIN — GABAPENTIN 300 MILLIGRAM(S): 400 CAPSULE ORAL at 07:46

## 2019-01-09 RX ADMIN — Medication 3 MILLILITER(S): at 12:23

## 2019-01-09 RX ADMIN — HUMAN INSULIN 2 UNIT(S): 100 INJECTION, SUSPENSION SUBCUTANEOUS at 07:42

## 2019-01-09 RX ADMIN — BUDESONIDE AND FORMOTEROL FUMARATE DIHYDRATE 2 PUFF(S): 160; 4.5 AEROSOL RESPIRATORY (INHALATION) at 21:36

## 2019-01-09 RX ADMIN — QUETIAPINE FUMARATE 25 MILLIGRAM(S): 200 TABLET, FILM COATED ORAL at 21:36

## 2019-01-09 RX ADMIN — Medication 680 MILLIGRAM(S): at 04:01

## 2019-01-09 RX ADMIN — Medication 1: at 12:22

## 2019-01-09 RX ADMIN — APIXABAN 5 MILLIGRAM(S): 2.5 TABLET, FILM COATED ORAL at 18:31

## 2019-01-09 RX ADMIN — GABAPENTIN 300 MILLIGRAM(S): 400 CAPSULE ORAL at 18:26

## 2019-01-09 RX ADMIN — TIZANIDINE 4 MILLIGRAM(S): 4 TABLET ORAL at 08:53

## 2019-01-09 RX ADMIN — FENTANYL CITRATE 1 PATCH: 50 INJECTION INTRAVENOUS at 20:00

## 2019-01-09 RX ADMIN — FENTANYL CITRATE 1 PATCH: 50 INJECTION INTRAVENOUS at 08:04

## 2019-01-09 RX ADMIN — GABAPENTIN 300 MILLIGRAM(S): 400 CAPSULE ORAL at 00:27

## 2019-01-09 RX ADMIN — Medication 300 MILLIGRAM(S): at 15:04

## 2019-01-09 RX ADMIN — Medication 680 MILLIGRAM(S): at 21:59

## 2019-01-09 RX ADMIN — BUDESONIDE AND FORMOTEROL FUMARATE DIHYDRATE 2 PUFF(S): 160; 4.5 AEROSOL RESPIRATORY (INHALATION) at 00:23

## 2019-01-09 RX ADMIN — APIXABAN 5 MILLIGRAM(S): 2.5 TABLET, FILM COATED ORAL at 06:53

## 2019-01-09 RX ADMIN — Medication 3 MILLILITER(S): at 18:29

## 2019-01-09 RX ADMIN — PANTOPRAZOLE SODIUM 40 MILLIGRAM(S): 20 TABLET, DELAYED RELEASE ORAL at 06:53

## 2019-01-09 RX ADMIN — FENTANYL CITRATE 1 PATCH: 50 INJECTION INTRAVENOUS at 12:22

## 2019-01-09 NOTE — PROGRESS NOTE ADULT - SUBJECTIVE AND OBJECTIVE BOX
CC: f/u for fever    Patient reports nothing intelligible    REVIEW OF SYSTEMS:  All other review of systems negative (Comprehensive ROS)    Antimicrobials Day #  :    Other Medications Reviewed    T(F): 98.4 (01-09-19 @ 17:33), Max: 100.6 (01-09-19 @ 03:45)  HR: 92 (01-09-19 @ 15:15)  BP: 102/62 (01-09-19 @ 15:15)  RR: 16 (01-09-19 @ 15:15)  SpO2: 97% (01-09-19 @ 15:15)  Wt(kg): --    PHYSICAL EXAM:  General: alert, babbles, no acute distress  Eyes:  anicteric, no conjunctival injection, no discharge  Oropharynx: no lesions or injection 	  Neck: supple, without adenopathy  Lungs: clear to auscultation  Heart: regular rate and rhythm; no murmur, rubs or gallops  Abdomen: soft, nondistended, nontender, without mass or organomegaly, peg  Skin: no lesions  Extremities: no clubbing, cyanosis,. right and left leg not tender or swollen  Neurologic: alert, babbles, curled up    LAB RESULTS:                        9.3    14.36 )-----------( 396      ( 09 Jan 2019 08:06 )             28.4     01-09    138  |  99  |  42<H>  ----------------------------<  115<H>  4.3   |  24  |  0.47<L>    Ca    10.1      09 Jan 2019 06:44          MICROBIOLOGY:  RECENT CULTURES:  01-07 @ 08:36 .Blood Blood-Peripheral     No growth to date.          RADIOLOGY REVIEWED:  < from: CT Chest No Cont (12.31.18 @ 10:09) >  IMPRESSION:   No pneumonia.    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 Has dvt on anticoag. Fever is slowly moderating but persists without new infection found. Possible drug induced lupus so minocycline stopped yesterday  Plan:  monitor off abx  await antihistone ab  monitor fever curve off minocycline

## 2019-01-09 NOTE — PROGRESS NOTE ADULT - ASSESSMENT
fever of unknown origin has been greater than 3 weeks   dvt    anxiety   depression   chronic pain  on neurontin fentanyl , medical marijuana

## 2019-01-09 NOTE — PROGRESS NOTE ADULT - SUBJECTIVE AND OBJECTIVE BOX
---___---___---___---___---___---___ ---___---___---___---___---___---___---___---___---___---                    <<<  M E D I C A L   A T T E N D I N G    F O L L O W    U P   N O T E  >>>    has still been getting fever last one 100.6  this morning .  states that she was anxious last night . holding zoloft and minocycline still with dvt    ---___---___---___---___---___---      <<<  MEDICATIONS:  >>>    MEDICATIONS  (STANDING):  ALBUTerol/ipratropium for Nebulization 3 milliLiter(s) Nebulizer every 6 hours  apixaban 5 milliGRAM(s) Oral every 12 hours  AQUAPHOR (petrolatum Ointment) 1 Application(s) Topical three times a day  buDESOnide 160 MICROgram(s)/formoterol 4.5 MICROgram(s) Inhaler 2 Puff(s) Inhalation two times a day  clobetasol 0.05% Ointment 1 Application(s) Topical two times a day  clonazePAM Tablet 0.5 milliGRAM(s) Oral at bedtime  dextrose 5%. 1000 milliLiter(s) (50 mL/Hr) IV Continuous <Continuous>  dextrose 50% Injectable 12.5 Gram(s) IV Push once  dextrose 50% Injectable 25 Gram(s) IV Push once  dextrose 50% Injectable 25 Gram(s) IV Push once  fentaNYL   Patch  12 MICROgram(s)/Hr 1 Patch Transdermal every 72 hours  ferrous    sulfate Liquid 300 milliGRAM(s) Enteral Tube daily  gabapentin   Solution 300 milliGRAM(s) Oral two times a day  insulin lispro (HumaLOG) corrective regimen sliding scale   SubCutaneous every 6 hours  insulin NPH human recombinant 2 Unit(s) SubCutaneous <User Schedule>  lactobacillus acidophilus 1 Tablet(s) Oral daily  lisinopril 5 milliGRAM(s) Oral daily  medical marijuana oil 1 milliLiter(s),medical marijuana oil 1 milliLiter(s),medical marijuana oil 1 milliLiter(s) 1 milliLiter(s) SubLingual <User Schedule>  mirtazapine 7.5 milliGRAM(s) Oral at bedtime  pantoprazole   Suspension 40 milliGRAM(s) Oral before breakfast  predniSONE  Solution 7.5 milliGRAM(s) Enteral Tube daily  QUEtiapine 25 milliGRAM(s) Oral at bedtime  sodium chloride 0.9%. 1000 milliLiter(s) (40 mL/Hr) IV Continuous <Continuous>  tiZANidine 4 milliGRAM(s) Oral <User Schedule>      MEDICATIONS  (PRN):  acetaminophen    Suspension .. 680 milliGRAM(s) Oral every 6 hours PRN Temp greater or equal to 38C (100.4F), Mild Pain (1 - 3)  dextrose 40% Gel 15 Gram(s) Oral once PRN Blood Glucose LESS THAN 70 milliGRAM(s)/deciliter  glucagon  Injectable 1 milliGRAM(s) IntraMuscular once PRN Glucose LESS THAN 70 milligrams/deciliter  ipratropium    for Nebulization 500 MICROGram(s) Nebulizer every 6 hours PRN Respiratory Distress  nystatin Powder 1 Application(s) Topical two times a day PRN rash/itchiness       ---___---___---___---___---___---     <<<REVIEW OF SYSTEM: >>>    unable to obtain      ---___---___---___---___---___---          <<<  VITAL SIGNS: >>>    T(F): 99.7 (19 @ 08:00), Max: 100.6 (19 @ 03:45)  HR: 89 (19 @ 03:45) (89 - 92)  BP: 115/64 (19 @ 03:45) (115/64 - 146/77)  RR: 18 (19 @ 03:45) (18 - 18)  SpO2: 94% (19 @ 03:45) (94% - 96%)  Wt(kg): --  CAPILLARY BLOOD GLUCOSE      POCT Blood Glucose.: 115 mg/dL (2019 07:04)    I&O's Summary    2019 07:01  -  2019 07:00  --------------------------------------------------------  IN: 120 mL / OUT: 1300 mL / NET: -1180 mL         ---___---___---___---___---___---                       PHYSICAL EXAM:    GEN: A&O X 0 , NAD , comfortable  HEENT: NCAT, PERRL, MMM, no scleral icterus, hearing intact  NECK: Supple, No JVD  CVS: S1S2 , regular , No M/R/G appreciated  PULM: CTA B/L,  no W/R/R appreciated  ABD.: soft. non tender, non distended,  bowel sounds present peg noted   Extrem: intact pulses , no edema noted  Derm: No rash or ecchymosis noted sacral decubitus noted   PSYCH: normal mood, no depression, not anxious     ---___---___---___---___---___---     <<<  LAB AND IMAGING: >>>                -    138  |  99  |  42<H>  ----------------------------<  115<H>  4.3   |  24  |  0.47<L>    Ca    10.1      2019 06:44             Urinalysis Basic - ( 2019 09:16 )    Color: Yellow / Appearance: Clear / S.019 / pH: x  Gluc: x / Ketone: Negative  / Bili: Negative / Urobili: Negative mg/dL   Blood: x / Protein: Negative mg/dL / Nitrite: Negative   Leuk Esterase: Moderate / RBC: 3 /HPF / WBC 50 /HPF   Sq Epi: x / Non Sq Epi: 1 /HPF / Bacteria: Negative                        [All pertinent / recent available Imaging reports and other labs reviewed]     ---___---___---___---___---___---___ ---___---___---___---___---           <<<  A S S E S S M E N T   A N D   P L A N :  >>>          -GI/DVT Prophylaxis.    --------------------------------------------  Case discussed with   Education given on     >>______________________<<      Deniz Bolden .         phone   [phone]

## 2019-01-10 LAB
ANION GAP SERPL CALC-SCNC: 16 MMOL/L — SIGNIFICANT CHANGE UP (ref 5–17)
BUN SERPL-MCNC: 42 MG/DL — HIGH (ref 7–23)
CALCIUM SERPL-MCNC: 9.9 MG/DL — SIGNIFICANT CHANGE UP (ref 8.4–10.5)
CHLORIDE SERPL-SCNC: 96 MMOL/L — SIGNIFICANT CHANGE UP (ref 96–108)
CO2 SERPL-SCNC: 23 MMOL/L — SIGNIFICANT CHANGE UP (ref 22–31)
CREAT SERPL-MCNC: 0.57 MG/DL — SIGNIFICANT CHANGE UP (ref 0.5–1.3)
ERYTHROCYTE [SEDIMENTATION RATE] IN BLOOD: 46 MM/HR — HIGH (ref 0–20)
GLUCOSE BLDC GLUCOMTR-MCNC: 123 MG/DL — HIGH (ref 70–99)
GLUCOSE BLDC GLUCOMTR-MCNC: 131 MG/DL — HIGH (ref 70–99)
GLUCOSE BLDC GLUCOMTR-MCNC: 132 MG/DL — HIGH (ref 70–99)
GLUCOSE BLDC GLUCOMTR-MCNC: 144 MG/DL — HIGH (ref 70–99)
GLUCOSE BLDC GLUCOMTR-MCNC: 160 MG/DL — HIGH (ref 70–99)
GLUCOSE BLDC GLUCOMTR-MCNC: 192 MG/DL — HIGH (ref 70–99)
GLUCOSE SERPL-MCNC: 116 MG/DL — HIGH (ref 70–99)
HCT VFR BLD CALC: 30.2 % — LOW (ref 34.5–45)
HGB BLD-MCNC: 9.8 G/DL — LOW (ref 11.5–15.5)
HISTONE AB SER-ACNC: 0.2 UNITS — SIGNIFICANT CHANGE UP (ref 0–0.9)
LACTATE SERPL-SCNC: 1 MMOL/L — SIGNIFICANT CHANGE UP (ref 0.7–2)
MCHC RBC-ENTMCNC: 30.1 PG — SIGNIFICANT CHANGE UP (ref 27–34)
MCHC RBC-ENTMCNC: 32.5 GM/DL — SIGNIFICANT CHANGE UP (ref 32–36)
MCV RBC AUTO: 92.6 FL — SIGNIFICANT CHANGE UP (ref 80–100)
PLATELET # BLD AUTO: 368 K/UL — SIGNIFICANT CHANGE UP (ref 150–400)
POTASSIUM SERPL-MCNC: 4.5 MMOL/L — SIGNIFICANT CHANGE UP (ref 3.5–5.3)
POTASSIUM SERPL-SCNC: 4.5 MMOL/L — SIGNIFICANT CHANGE UP (ref 3.5–5.3)
PROCALCITONIN SERPL-MCNC: 0.49 NG/ML — HIGH (ref 0.02–0.1)
RBC # BLD: 3.26 M/UL — LOW (ref 3.8–5.2)
RBC # FLD: 15.5 % — HIGH (ref 10.3–14.5)
SODIUM SERPL-SCNC: 135 MMOL/L — SIGNIFICANT CHANGE UP (ref 135–145)
WBC # BLD: 13.56 K/UL — HIGH (ref 3.8–10.5)
WBC # FLD AUTO: 13.56 K/UL — HIGH (ref 3.8–10.5)

## 2019-01-10 RX ORDER — SODIUM CHLORIDE 9 MG/ML
250 INJECTION INTRAMUSCULAR; INTRAVENOUS; SUBCUTANEOUS ONCE
Qty: 0 | Refills: 0 | Status: COMPLETED | OUTPATIENT
Start: 2019-01-10 | End: 2019-01-10

## 2019-01-10 RX ORDER — FAMOTIDINE 10 MG/ML
20 INJECTION INTRAVENOUS ONCE
Qty: 0 | Refills: 0 | Status: COMPLETED | OUTPATIENT
Start: 2019-01-10 | End: 2019-01-10

## 2019-01-10 RX ORDER — DIPHENHYDRAMINE HCL 50 MG
25 CAPSULE ORAL ONCE
Qty: 0 | Refills: 0 | Status: COMPLETED | OUTPATIENT
Start: 2019-01-10 | End: 2019-01-10

## 2019-01-10 RX ADMIN — Medication 7.5 MILLIGRAM(S): at 06:33

## 2019-01-10 RX ADMIN — Medication 3 MILLILITER(S): at 18:17

## 2019-01-10 RX ADMIN — APIXABAN 5 MILLIGRAM(S): 2.5 TABLET, FILM COATED ORAL at 06:32

## 2019-01-10 RX ADMIN — Medication 1 APPLICATION(S): at 06:32

## 2019-01-10 RX ADMIN — SODIUM CHLORIDE 1500 MILLILITER(S): 9 INJECTION INTRAMUSCULAR; INTRAVENOUS; SUBCUTANEOUS at 06:46

## 2019-01-10 RX ADMIN — APIXABAN 5 MILLIGRAM(S): 2.5 TABLET, FILM COATED ORAL at 18:46

## 2019-01-10 RX ADMIN — Medication 1 APPLICATION(S): at 18:26

## 2019-01-10 RX ADMIN — TIZANIDINE 4 MILLIGRAM(S): 4 TABLET ORAL at 22:01

## 2019-01-10 RX ADMIN — HUMAN INSULIN 2 UNIT(S): 100 INJECTION, SUSPENSION SUBCUTANEOUS at 06:33

## 2019-01-10 RX ADMIN — QUETIAPINE FUMARATE 25 MILLIGRAM(S): 200 TABLET, FILM COATED ORAL at 22:01

## 2019-01-10 RX ADMIN — Medication 3 MILLILITER(S): at 13:56

## 2019-01-10 RX ADMIN — Medication 680 MILLIGRAM(S): at 00:15

## 2019-01-10 RX ADMIN — SODIUM CHLORIDE 1500 MILLILITER(S): 9 INJECTION INTRAMUSCULAR; INTRAVENOUS; SUBCUTANEOUS at 05:49

## 2019-01-10 RX ADMIN — Medication 25 MILLIGRAM(S): at 23:51

## 2019-01-10 RX ADMIN — GABAPENTIN 300 MILLIGRAM(S): 400 CAPSULE ORAL at 09:20

## 2019-01-10 RX ADMIN — GABAPENTIN 300 MILLIGRAM(S): 400 CAPSULE ORAL at 22:02

## 2019-01-10 RX ADMIN — Medication 300 MILLIGRAM(S): at 13:56

## 2019-01-10 RX ADMIN — FAMOTIDINE 20 MILLIGRAM(S): 10 INJECTION INTRAVENOUS at 23:54

## 2019-01-10 RX ADMIN — Medication 3 MILLILITER(S): at 06:32

## 2019-01-10 RX ADMIN — Medication 1: at 13:57

## 2019-01-10 RX ADMIN — PANTOPRAZOLE SODIUM 40 MILLIGRAM(S): 20 TABLET, DELAYED RELEASE ORAL at 06:32

## 2019-01-10 RX ADMIN — Medication 1 TABLET(S): at 13:56

## 2019-01-10 RX ADMIN — Medication 1 APPLICATION(S): at 22:01

## 2019-01-10 RX ADMIN — MIRTAZAPINE 7.5 MILLIGRAM(S): 45 TABLET, ORALLY DISINTEGRATING ORAL at 22:01

## 2019-01-10 RX ADMIN — BUDESONIDE AND FORMOTEROL FUMARATE DIHYDRATE 2 PUFF(S): 160; 4.5 AEROSOL RESPIRATORY (INHALATION) at 20:25

## 2019-01-10 RX ADMIN — Medication 0.5 MILLIGRAM(S): at 22:01

## 2019-01-10 RX ADMIN — Medication 1 APPLICATION(S): at 14:34

## 2019-01-10 RX ADMIN — TIZANIDINE 4 MILLIGRAM(S): 4 TABLET ORAL at 09:20

## 2019-01-10 RX ADMIN — FENTANYL CITRATE 1 PATCH: 50 INJECTION INTRAVENOUS at 07:01

## 2019-01-10 NOTE — PROVIDER CONTACT NOTE (OTHER) - ASSESSMENT
pt AO1, bedbound, nonverbal, lethargic at baseline  pt appears more lethargic, arousable to stimuli,   Afebrile 99.6  BP 82/40 pt AO1, bedbound, nonverbal, lethargic at baseline  pt appears more lethargic, arousable to stimuli,   Afebrile 99.6  BP 82/42

## 2019-01-10 NOTE — PROVIDER CONTACT NOTE (OTHER) - ASSESSMENT
pt AO1, bedbound, lethargic arousable to stimuli  pt febrile 100.5   VS otherwise stable see flowsheet

## 2019-01-10 NOTE — PROGRESS NOTE ADULT - SUBJECTIVE AND OBJECTIVE BOX
---___---___---___---___---___---___ ---___---___---___---___---___---___---___---___---___---                    <<<  M E D I C A L   A T T E N D I N G    F O L L O W    U P   N O T E  >>>    patient had hypotensive  episode but is doing well   ---___---___---___---___---___---      <<<  MEDICATIONS:  >>>    MEDICATIONS  (STANDING):  ALBUTerol/ipratropium for Nebulization 3 milliLiter(s) Nebulizer every 6 hours  apixaban 5 milliGRAM(s) Oral every 12 hours  AQUAPHOR (petrolatum Ointment) 1 Application(s) Topical three times a day  buDESOnide 160 MICROgram(s)/formoterol 4.5 MICROgram(s) Inhaler 2 Puff(s) Inhalation two times a day  clobetasol 0.05% Ointment 1 Application(s) Topical two times a day  clonazePAM Tablet 0.5 milliGRAM(s) Oral at bedtime  dextrose 5%. 1000 milliLiter(s) (50 mL/Hr) IV Continuous <Continuous>  dextrose 50% Injectable 12.5 Gram(s) IV Push once  dextrose 50% Injectable 25 Gram(s) IV Push once  dextrose 50% Injectable 25 Gram(s) IV Push once  ferrous    sulfate Liquid 300 milliGRAM(s) Enteral Tube daily  gabapentin   Solution 300 milliGRAM(s) Oral two times a day  insulin lispro (HumaLOG) corrective regimen sliding scale   SubCutaneous every 6 hours  insulin NPH human recombinant 2 Unit(s) SubCutaneous <User Schedule>  lactobacillus acidophilus 1 Tablet(s) Oral daily  lisinopril 5 milliGRAM(s) Oral daily  medical marijuana oil 1 milliLiter(s),medical marijuana oil 1 milliLiter(s),medical marijuana oil 1 milliLiter(s) 1 milliLiter(s) SubLingual <User Schedule>  mirtazapine 7.5 milliGRAM(s) Oral at bedtime  pantoprazole   Suspension 40 milliGRAM(s) Oral before breakfast  predniSONE  Solution 7.5 milliGRAM(s) Enteral Tube daily  QUEtiapine 25 milliGRAM(s) Oral at bedtime  sodium chloride 0.9%. 1000 milliLiter(s) (40 mL/Hr) IV Continuous <Continuous>  tiZANidine 4 milliGRAM(s) Oral <User Schedule>      MEDICATIONS  (PRN):  acetaminophen    Suspension .. 680 milliGRAM(s) Oral every 6 hours PRN Temp greater or equal to 38C (100.4F), Mild Pain (1 - 3)  dextrose 40% Gel 15 Gram(s) Oral once PRN Blood Glucose LESS THAN 70 milliGRAM(s)/deciliter  glucagon  Injectable 1 milliGRAM(s) IntraMuscular once PRN Glucose LESS THAN 70 milligrams/deciliter  ipratropium    for Nebulization 500 MICROGram(s) Nebulizer every 6 hours PRN Respiratory Distress  nystatin Powder 1 Application(s) Topical two times a day PRN rash/itchiness       ---___---___---___---___---___---     <<<REVIEW OF SYSTEM: >>>    unable to obtain      ---___---___---___---___---___---          <<<  VITAL SIGNS: >>>    T(F): 97.5 (01-10-19 @ 12:20), Max: 100.5 (01-09-19 @ 21:05)  HR: 87 (01-10-19 @ 12:20) (80 - 92)  BP: 96/56 (01-10-19 @ 12:20) (82/42 - 102/62)  RR: 18 (01-10-19 @ 12:20) (16 - 18)  SpO2: 95% (01-10-19 @ 12:20) (95% - 98%)  Wt(kg): --  CAPILLARY BLOOD GLUCOSE      POCT Blood Glucose.: 192 mg/dL (10 Anuel 2019 12:19)    I&O's Summary    09 Jan 2019 07:01  -  10 Anuel 2019 07:00  --------------------------------------------------------  IN: 720 mL / OUT: 1140 mL / NET: -420 mL    10 Anuel 2019 07:01  -  10 Anuel 2019 13:00  --------------------------------------------------------  IN: 500 mL / OUT: 0 mL / NET: 500 mL         ---___---___---___---___---___---                       PHYSICAL EXAM:    GEN: A&O X 0 , NAD , comfortable  HEENT: NCAT, PERRL, MMM, no scleral icterus, hearing intact  NECK: Supple, No JVD  CVS: S1S2 , regular , No M/R/G appreciated  PULM: CTA B/L,  no W/R/R appreciated  ABD.: soft. non tender, non distended,  bowel sounds present peg noted   Extrem: intact pulses , no edema noted  Derm: No rash or ecchymosis noted     ---___---___---___---___---___---     <<<  LAB AND IMAGING: >>>                          9.8    13.56 )-----------( 368      ( 10 Anuel 2019 07:22 )             30.2               01-10    135  |  96  |  42<H>  ----------------------------<  116<H>  4.5   |  23  |  0.57    Ca    9.9      10 Anuel 2019 05:44                                 [All pertinent / recent available Imaging reports and other labs reviewed]     ---___---___---___---___---___---___ ---___---___---___---___---           <<<  A S S E S S M E N T   A N D   P L A N :  >>>          -GI/DVT Prophylaxis.    --------------------------------------------  Case discussed with   Education given on     >>______________________<<      Deniz Bolden .         phone   [phone]

## 2019-01-10 NOTE — CHART NOTE - NSCHARTNOTEFT_GEN_A_CORE
Called by RN that, pt was noted to have Temp of 100.5F [Orally] at 2105PM. 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 Per patients family patient w/ recent admission d/c on 11/19/18 for metabolic encephalopathy 2' complicated cystitis given chronic gavin catheter as well as aspiration PNA. She was d/c to home and per her family has had continued lethargy. Was seen by home PCP 5 days ago where labs and cxr was performed. CXR reported negative. Received call today that pro-bnp was elevated. Per family pt appears to be sob when laying flat and patient endorses sob as well.  Now noted to have Temp of100.5F.     # Fever.  - Possible drug induced lupus   - WBC: 14.36.  - f/u Lactate, Procalcitonin in am.   - f/u BCX x2 SENT on 1/9 am.    - c/w IVF.   - Tylenol prn fever.   - Cooling Measures.   - f/u ID Dr. Jimi ruiz.   - Right  MRSA prosthetic hip infection, s/p leisa and spacer, was on suppressive minocycline,   - minocycline stopped by ID.   - monitor off abx.   - f/u antihistone ab.   - chronic gavin completed tx for cre pseudomonas.   - Recent CT of C/A/P  with no defined infection.   - Sacral decubitus described as not infected- cx with typical colonizing vince.   - As per ID, No indication for additional antibiotics other than suppressive minocycline  - Repeat ucx- yeast- colonizing vince, - Hold antifungal Tx for now as per ID.  - Will endorse to primary team in am.     DOROTHY. CHRISTINE DAY   # [id number]  Medicine PA.

## 2019-01-10 NOTE — PROGRESS NOTE ADULT - ASSESSMENT
fever   hypotension   dvt   depression       hypotensive episode likley secondary to clot retraction along with the low grade fever  continue eilquis   montior vitals

## 2019-01-11 DIAGNOSIS — L29.9 PRURITUS, UNSPECIFIED: ICD-10-CM

## 2019-01-11 DIAGNOSIS — Z51.5 ENCOUNTER FOR PALLIATIVE CARE: ICD-10-CM

## 2019-01-11 DIAGNOSIS — F03.90 UNSPECIFIED DEMENTIA WITHOUT BEHAVIORAL DISTURBANCE: ICD-10-CM

## 2019-01-11 LAB
GLUCOSE BLDC GLUCOMTR-MCNC: 112 MG/DL — HIGH (ref 70–99)
GLUCOSE BLDC GLUCOMTR-MCNC: 134 MG/DL — HIGH (ref 70–99)
GLUCOSE BLDC GLUCOMTR-MCNC: 168 MG/DL — HIGH (ref 70–99)
GLUCOSE BLDC GLUCOMTR-MCNC: 43 MG/DL — CRITICAL LOW (ref 70–99)
GLUCOSE BLDC GLUCOMTR-MCNC: 60 MG/DL — LOW (ref 70–99)
GLUCOSE BLDC GLUCOMTR-MCNC: 80 MG/DL — SIGNIFICANT CHANGE UP (ref 70–99)

## 2019-01-11 PROCEDURE — 99498 ADVNCD CARE PLAN ADDL 30 MIN: CPT | Mod: 25

## 2019-01-11 PROCEDURE — 99497 ADVNCD CARE PLAN 30 MIN: CPT | Mod: 25

## 2019-01-11 PROCEDURE — 99223 1ST HOSP IP/OBS HIGH 75: CPT

## 2019-01-11 RX ORDER — MEN-PHOR .5; .5 G/G; G/G
1 LOTION TOPICAL
Qty: 0 | Refills: 0 | Status: DISCONTINUED | OUTPATIENT
Start: 2019-01-11 | End: 2019-01-16

## 2019-01-11 RX ORDER — DEXTROSE 50 % IN WATER 50 %
12.5 SYRINGE (ML) INTRAVENOUS ONCE
Qty: 0 | Refills: 0 | Status: COMPLETED | OUTPATIENT
Start: 2019-01-11 | End: 2019-01-11

## 2019-01-11 RX ADMIN — Medication 3 MILLILITER(S): at 17:09

## 2019-01-11 RX ADMIN — APIXABAN 5 MILLIGRAM(S): 2.5 TABLET, FILM COATED ORAL at 06:17

## 2019-01-11 RX ADMIN — Medication 1 APPLICATION(S): at 06:18

## 2019-01-11 RX ADMIN — GABAPENTIN 300 MILLIGRAM(S): 400 CAPSULE ORAL at 06:18

## 2019-01-11 RX ADMIN — HUMAN INSULIN 2 UNIT(S): 100 INJECTION, SUSPENSION SUBCUTANEOUS at 08:12

## 2019-01-11 RX ADMIN — Medication 1 TABLET(S): at 11:50

## 2019-01-11 RX ADMIN — BUDESONIDE AND FORMOTEROL FUMARATE DIHYDRATE 2 PUFF(S): 160; 4.5 AEROSOL RESPIRATORY (INHALATION) at 20:22

## 2019-01-11 RX ADMIN — LISINOPRIL 5 MILLIGRAM(S): 2.5 TABLET ORAL at 06:17

## 2019-01-11 RX ADMIN — APIXABAN 5 MILLIGRAM(S): 2.5 TABLET, FILM COATED ORAL at 17:09

## 2019-01-11 RX ADMIN — Medication 1 APPLICATION(S): at 17:08

## 2019-01-11 RX ADMIN — Medication 300 MILLIGRAM(S): at 11:50

## 2019-01-11 RX ADMIN — Medication 680 MILLIGRAM(S): at 23:26

## 2019-01-11 RX ADMIN — QUETIAPINE FUMARATE 25 MILLIGRAM(S): 200 TABLET, FILM COATED ORAL at 22:04

## 2019-01-11 RX ADMIN — Medication 1: at 16:46

## 2019-01-11 RX ADMIN — TIZANIDINE 4 MILLIGRAM(S): 4 TABLET ORAL at 11:50

## 2019-01-11 RX ADMIN — Medication 0.5 MILLIGRAM(S): at 22:04

## 2019-01-11 RX ADMIN — GABAPENTIN 300 MILLIGRAM(S): 400 CAPSULE ORAL at 17:09

## 2019-01-11 RX ADMIN — MIRTAZAPINE 7.5 MILLIGRAM(S): 45 TABLET, ORALLY DISINTEGRATING ORAL at 22:04

## 2019-01-11 RX ADMIN — Medication 12.5 GRAM(S): at 06:58

## 2019-01-11 RX ADMIN — Medication 3 MILLILITER(S): at 06:17

## 2019-01-11 RX ADMIN — PANTOPRAZOLE SODIUM 40 MILLIGRAM(S): 20 TABLET, DELAYED RELEASE ORAL at 06:17

## 2019-01-11 RX ADMIN — Medication 1 APPLICATION(S): at 22:04

## 2019-01-11 RX ADMIN — Medication 1 APPLICATION(S): at 11:49

## 2019-01-11 RX ADMIN — Medication 7.5 MILLIGRAM(S): at 06:17

## 2019-01-11 RX ADMIN — TIZANIDINE 4 MILLIGRAM(S): 4 TABLET ORAL at 22:04

## 2019-01-11 RX ADMIN — MEN-PHOR 1 APPLICATION(S): .5; .5 LOTION TOPICAL at 23:00

## 2019-01-11 RX ADMIN — Medication 680 MILLIGRAM(S): at 06:16

## 2019-01-11 NOTE — PROGRESS NOTE ADULT - SUBJECTIVE AND OBJECTIVE BOX
---___---___---___---___---___---___ ---___---___---___---___---___---___---___---___---___---                    <<<  M E D I C A L   A T T E N D I N G    F O L L O W    U P   N O T E  >>>    remains unchanged      ---___---___---___---___---___---      <<<  MEDICATIONS:  >>>    MEDICATIONS  (STANDING):  ALBUTerol/ipratropium for Nebulization 3 milliLiter(s) Nebulizer every 6 hours  apixaban 5 milliGRAM(s) Oral every 12 hours  AQUAPHOR (petrolatum Ointment) 1 Application(s) Topical three times a day  buDESOnide 160 MICROgram(s)/formoterol 4.5 MICROgram(s) Inhaler 2 Puff(s) Inhalation two times a day  clobetasol 0.05% Ointment 1 Application(s) Topical two times a day  clonazePAM Tablet 0.5 milliGRAM(s) Oral at bedtime  dextrose 5%. 1000 milliLiter(s) (50 mL/Hr) IV Continuous <Continuous>  dextrose 50% Injectable 12.5 Gram(s) IV Push once  dextrose 50% Injectable 25 Gram(s) IV Push once  dextrose 50% Injectable 25 Gram(s) IV Push once  ferrous    sulfate Liquid 300 milliGRAM(s) Enteral Tube daily  gabapentin   Solution 300 milliGRAM(s) Oral two times a day  insulin lispro (HumaLOG) corrective regimen sliding scale   SubCutaneous every 6 hours  insulin NPH human recombinant 2 Unit(s) SubCutaneous <User Schedule>  lactobacillus acidophilus 1 Tablet(s) Oral daily  lisinopril 5 milliGRAM(s) Oral daily  medical marijuana oil 1 milliLiter(s),medical marijuana oil 1 milliLiter(s),medical marijuana oil 1 milliLiter(s) 1 milliLiter(s) SubLingual <User Schedule>  mirtazapine 7.5 milliGRAM(s) Oral at bedtime  pantoprazole   Suspension 40 milliGRAM(s) Oral before breakfast  predniSONE  Solution 7.5 milliGRAM(s) Enteral Tube daily  QUEtiapine 25 milliGRAM(s) Oral at bedtime  sodium chloride 0.9%. 1000 milliLiter(s) (40 mL/Hr) IV Continuous <Continuous>  tiZANidine 4 milliGRAM(s) Oral <User Schedule>      MEDICATIONS  (PRN):  acetaminophen    Suspension .. 680 milliGRAM(s) Oral every 6 hours PRN Temp greater or equal to 38C (100.4F), Mild Pain (1 - 3)  dextrose 40% Gel 15 Gram(s) Oral once PRN Blood Glucose LESS THAN 70 milliGRAM(s)/deciliter  glucagon  Injectable 1 milliGRAM(s) IntraMuscular once PRN Glucose LESS THAN 70 milligrams/deciliter  ipratropium    for Nebulization 500 MICROGram(s) Nebulizer every 6 hours PRN Respiratory Distress  nystatin Powder 1 Application(s) Topical two times a day PRN rash/itchiness       ---___---___---___---___---___---     <<<REVIEW OF SYSTEM: >>>    unable to obtain      ---___---___---___---___---___---          <<<  VITAL SIGNS: >>>    T(F): 100.3 (01-11-19 @ 06:14), Max: 100.3 (01-11-19 @ 06:14)  HR: 87 (01-11-19 @ 06:14) (80 - 95)  BP: 114/66 (01-11-19 @ 06:14) (96/56 - 114/66)  RR: 18 (01-11-19 @ 06:14) (18 - 18)  SpO2: 97% (01-11-19 @ 06:14) (95% - 98%)  Wt(kg): --  CAPILLARY BLOOD GLUCOSE      POCT Blood Glucose.: 134 mg/dL (11 Jan 2019 07:24)    I&O's Summary    10 Anuel 2019 07:01  -  11 Jan 2019 07:00  --------------------------------------------------------  IN: 500 mL / OUT: 1230 mL / NET: -730 mL         ---___---___---___---___---___---                       PHYSICAL EXAM:    GEN: A&O X 0 , NAD , comfortable  HEENT: NCAT, PERRL, MMM, no scleral icterus, hearing intact  NECK: Supple, No JVD  CVS: S1S2 , regular , No M/R/G appreciated  PULM: CTA B/L,  no W/R/R appreciated  ABD.: soft. non tender, non distended,  bowel sounds present  Extrem: intact pulses , no edema noted  Derm: No rash or ecchymosis noted        ---___---___---___---___---___---     <<<  LAB AND IMAGING: >>>                          9.8    13.56 )-----------( 368      ( 10 Anuel 2019 07:22 )             30.2               01-10    135  |  96  |  42<H>  ----------------------------<  116<H>  4.5   |  23  |  0.57    Ca    9.9      10 Anuel 2019 05:44                                 [All pertinent / recent available Imaging reports and other labs reviewed]     ---___---___---___---___---___---___ ---___---___---___---___---           <<<  A S S E S S M E N T   A N D   P L A N :  >>>          -GI/DVT Prophylaxis.    --------------------------------------------  Case discussed with   Education given on     >>______________________<<      Deniz Bolden .         phone   [phone]

## 2019-01-11 NOTE — CONSULT NOTE ADULT - SUBJECTIVE AND OBJECTIVE BOX
HPI:  68F with advanced dementia (nonverbal, dysphagia with PEG tube, bedbound- functional quadriplegia, chronic gavin catheter in place), sacral pressure ulcer stage 3, heel pressure ulcers, asthma on prednisone, HLD, CVA, UC, 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 On admission, her family indicated the patient's breathing was normal (somewhat SOB at baseline). They denied fevers, chills, coughing, skin rashes. She is current treated and evaluated for fever (unclear etiology) and DVT. Palliative care was called for GOC in the setting of advanced illness and readmissions.       PERTINENT PM/SXH:   CVA (cerebral vascular accident)  Fatty pancreas  Fatty liver  PNA (pneumonia)  Pulmonary HTN  IGT (impaired glucose tolerance)  Ulcerative colitis  Acid reflux  Anxiety  Depression  Mouth sores  HLD (hyperlipidemia)  Asthma    Humeral head fracture  H/O: hysterectomy  H/O cataract extraction, left  History of knee replacement    FAMILY HISTORY:  Family history of dementia (Grandparent)  Family history of colon cancer (Grandparent)    ITEMS NOT CHECKED ARE NOT PRESENT    SOCIAL HISTORY:   Significant other/partner:   [ ]  Children:  [ ]  Yarsanism/Spirituality:  Substance hx:  [ ]   Tobacco hx:  [ ]   Alcohol hx: [ ]   Home Opioid hx:  [ ] I-Stop Reference No:  Living Situation: [ ]Home  [ ]Long term care  [ ]Rehab [ ]Other  As per case management notes: "LMSW met at bedside with patient's spouse-Kimberly Colon to provide supportive  counseling.  Patient's spouse extremely pleasant and talkative in good spirits  states patient was more alert and sassy over the last couple of days.  Spouse  reports good communication with medical team.  Patient's spouse had a few  questions which LMSW related to RN and nutrition.  Spouse is hopeful for  patient to return home in near future when medically cleared.  Patient lives  with spouse in their [place] [place] private home and family provides around the  clock care for patient and peg feeds and supplies provided by [facility], on  bolus  feed and VNS  for wound care, OT/PT, speech therapy,  and HHA  services 4x5 days provided by [facility]. "     ADVANCE DIRECTIVES:    DNR  MOLST  [ ]  Living Will  [ ]   DECISION MAKER(s):  [ ] Health Care Proxy(s)  [ ] Surrogate(s)  [ ] Guardian           Name(s): Phone Number(s):    BASELINE (I)ADL(s) (prior to admission):  [place]: [ ]Total  [ ] Moderate [ x]Dependent    Allergies    ASA; dye contrast (Anaphylaxis)  aspirin (Short breath)  divalproex sodium (Other (Unknown))  Haldol (Other (Unknown))  penicillin (Short breath; Rash)  sulfa drugs (Short breath; Rash)  vancomycin (Rash; Urticaria; Hives)  Xanax (Other (Unknown))    Intolerances    MEDICATIONS  (STANDING):  ALBUTerol/ipratropium for Nebulization 3 milliLiter(s) Nebulizer every 6 hours  apixaban 5 milliGRAM(s) Oral every 12 hours  AQUAPHOR (petrolatum Ointment) 1 Application(s) Topical three times a day  buDESOnide 160 MICROgram(s)/formoterol 4.5 MICROgram(s) Inhaler 2 Puff(s) Inhalation two times a day  clobetasol 0.05% Ointment 1 Application(s) Topical two times a day  clonazePAM Tablet 0.5 milliGRAM(s) Oral at bedtime  dextrose 5%. 1000 milliLiter(s) (50 mL/Hr) IV Continuous <Continuous>  dextrose 50% Injectable 12.5 Gram(s) IV Push once  dextrose 50% Injectable 25 Gram(s) IV Push once  dextrose 50% Injectable 25 Gram(s) IV Push once  ferrous    sulfate Liquid 300 milliGRAM(s) Enteral Tube daily  gabapentin   Solution 300 milliGRAM(s) Oral two times a day  insulin lispro (HumaLOG) corrective regimen sliding scale   SubCutaneous every 6 hours  insulin NPH human recombinant 2 Unit(s) SubCutaneous <User Schedule>  lactobacillus acidophilus 1 Tablet(s) Oral daily  lisinopril 5 milliGRAM(s) Oral daily  medical marijuana oil 1 milliLiter(s),medical marijuana oil 1 milliLiter(s),medical marijuana oil 1 milliLiter(s) 1 milliLiter(s) SubLingual <User Schedule>  mirtazapine 7.5 milliGRAM(s) Oral at bedtime  pantoprazole   Suspension 40 milliGRAM(s) Oral before breakfast  predniSONE  Solution 7.5 milliGRAM(s) Enteral Tube daily  QUEtiapine 25 milliGRAM(s) Oral at bedtime  sodium chloride 0.9%. 1000 milliLiter(s) (40 mL/Hr) IV Continuous <Continuous>  tiZANidine 4 milliGRAM(s) Oral <User Schedule>    MEDICATIONS  (PRN):  acetaminophen    Suspension .. 680 milliGRAM(s) Oral every 6 hours PRN Temp greater or equal to 38C (100.4F), Mild Pain (1 - 3)  dextrose 40% Gel 15 Gram(s) Oral once PRN Blood Glucose LESS THAN 70 milliGRAM(s)/deciliter  glucagon  Injectable 1 milliGRAM(s) IntraMuscular once PRN Glucose LESS THAN 70 milligrams/deciliter  ipratropium    for Nebulization 500 MICROGram(s) Nebulizer every 6 hours PRN Respiratory Distress  nystatin Powder 1 Application(s) Topical two times a day PRN rash/itchiness    PRESENT SYMPTOMS: [ ]Unable to obtain due to poor mentation   Source if other than patient:  [ ]Family   [ ]Team     Pain (Impact on QOL):    Location -         Minimal acceptable level (0-10 scale):                    Aggravating factors -  Quality -  Radiation -  Severity (0-10 scale) -    Timing -    PAIN AD Score:     [email or web address] (press ctrl +  left click to view)    Dyspnea:                           [ ]Mild [ ]Moderate [ ]Severe  Anxiety:                             [ ]Mild [ ]Moderate [ ]Severe  Fatigue:                             [ ]Mild [ ]Moderate [ ]Severe  Nausea:                             [ ]Mild [ ]Moderate [ ]Severe  Loss of appetite:              [ ]Mild [ ]Moderate [ ]Severe  Constipation:                    [ ]Mild [ ]Moderate [ ]Severe    Other Symptoms:  [ ]All other review of systems negative     Karnofsky Performance Score/Palliative Performance Status Version 2:         %    http://palliative.info/resource_material/PPSv2.pdf  PHYSICAL EXAM:  Vital Signs Last 24 Hrs  T(C): 37.9 (11 Jan 2019 06:14), Max: 37.9 (11 Jan 2019 06:14)  T(F): 100.3 (11 Jan 2019 06:14), Max: 100.3 (11 Jan 2019 06:14)  HR: 87 (11 Jan 2019 06:14) (87 - 95)  BP: 114/66 (11 Jan 2019 06:14) (96/56 - 114/66)  BP(mean): --  RR: 18 (11 Jan 2019 06:14) (18 - 18)  SpO2: 97% (11 Jan 2019 06:14) (95% - 97%) I&O's Summary    10 Anuel 2019 07:01  -  11 Jan 2019 07:00  --------------------------------------------------------  IN: 500 mL / OUT: 1230 mL / NET: -730 mL    GENERAL:  [ ]Alert  [ ]Oriented x   [ ]Lethargic  [ ]Cachexia  [ ]Unarousable  [ ]Verbal  [ ]Non-Verbal  Behavioral:   [ ] Anxiety  [ ] Delirium [ ] Agitation [ ] Other  HEENT:  [ ]Normal   [ ]Dry mouth   [ ]ET Tube/Trach  [ ]Oral lesions  PULMONARY:   [ ]Clear [ ]Tachypnea  [ ]Audible excessive secretions   [ ]Rhonchi        [ ]Right [ ]Left [ ]Bilateral  [ ]Crackles        [ ]Right [ ]Left [ ]Bilateral  [ ]Wheezing     [ ]Right [ ]Left [ ]Bilateral  CARDIOVASCULAR:    [ ]Regular [ ]Irregular [ ]Tachy  [ ]Sourav [ ]Murmur [ ]Other  GASTROINTESTINAL:  [ ]Soft  [ ]Distended   [ ]+BS  [ ]Non tender [ ]Tender  [ ]PEG [ ]OGT/ NGT  Last BM: 1/11/19  GENITOURINARY:  [ ]Normal [ ] Incontinent   [ ]Oliguria/Anuria   [ ]Gavin  MUSCULOSKELETAL:   [ ]Normal   [ ]Weakness  [ ]Bed/Wheelchair bound [ ]Edema  NEUROLOGIC:   [ ]No focal deficits  [ ] Cognitive impairment  [ ] Dysphagia [ ]Dysarthria [ ] Paresis [ ]Other   SKIN:   [ ]Normal   [ x]Pressure ulcer(s)m Sacrum stage III, Lumbar spine stage II for details please see nursing flow sheets  [ ]Rash    CRITICAL CARE:  [ ] Shock Present  [ ]Septic [ ]Cardiogenic [ ]Neurologic [ ]Hypovolemic  [ ]  Vasopressors [ ]  Inotropes   [ ] Respiratory failure present  [ ] Acute  [ ] Chronic [ ] Hypoxic  [ ] Hypercarbic [ ] Other  [ ] Other organ failure     LABS:                        9.8    13.56 )-----------( 368      ( 10 Anuel 2019 07:22 )             30.2   01-10    135  |  96  |  42<H>  ----------------------------<  116<H>  4.5   |  23  |  0.57    Ca    9.9      10 Anuel 2019 05:44          RADIOLOGY & ADDITIONAL STUDIES:    PROTEIN CALORIE MALNUTRITION PRESENT: [ ] Yes [ ] No  [ ] PPSV2 < or = to 30% [ ] significant weight loss  [ ] poor nutritional intake [ ] catabolic state [ ] anasarca     Albumin, Serum: 3.9 g/dL (12-22-18 @ 06:30)  Artificial Nutrition [ ]     REFERRALS:   [ ]Chaplaincy  [ ] Hospice  [ ]Child Life  [ ]Social Work  [ ]Case management [ ]Holistic Therapy   Goals of Care Discussion Document: HPI:  68F with advanced dementia (nonverbal, dysphagia with PEG tube, bedbound- functional quadriplegia, chronic gavin catheter in place), sacral pressure ulcer stage 3, heel pressure ulcers, asthma on prednisone, HLD, CVA, UC, right prosthetic hip MRSA infection in 7/2018 s/p pacer in place, recent admission for treatment of UTI and aspiration pneumonia, Systolic HF with biventricular disfunction (EF 26%, Mod MR/AR), Severe pulmonary HT,  presenting from home with increased proBNP. On admission, her family indicated the patient's breathing was normal (somewhat SOB at baseline). They denied fevers, chills, coughing, skin rashes. She is current treated and evaluated for fever (unclear etiology) and DVT. Palliative care was called for GOC in the setting of advanced illness and readmissions.       PERTINENT PM/SXH:   CVA (cerebral vascular accident)  Fatty pancreas  Fatty liver  PNA (pneumonia)  Pulmonary HTN  IGT (impaired glucose tolerance)  Ulcerative colitis  Acid reflux  Anxiety  Depression  Mouth sores  HLD (hyperlipidemia)  Asthma    Humeral head fracture  H/O: hysterectomy  H/O cataract extraction, left  History of knee replacement    FAMILY HISTORY:  Family history of dementia (Grandparent)  Family history of colon cancer (Grandparent)    ITEMS NOT CHECKED ARE NOT PRESENT    SOCIAL HISTORY:   Significant other/partner:   [ ]  Children:  [ ]  Samaritan/Spirituality:  Substance hx:  [ ]   Tobacco hx:  [ ]   Alcohol hx: [ ]   Home Opioid hx:  [ ] I-Stop Reference No:  Living Situation: [ ]Home  [ ]Long term care  [ ]Rehab [ ]Other  As per case management notes: "LMSW met at bedside with patient's spouse-Kimberly Colon to provide supportive  counseling.  Patient's spouse extremely pleasant and talkative in good spirits  states patient was more alert and sassy over the last couple of days.  Spouse  reports good communication with medical team.  Patient's spouse had a few  questions which LMSW related to RN and nutrition.  Spouse is hopeful for  patient to return home in near future when medically cleared.  Patient lives  with spouse in their [place] private home and family provides around the  clock care for patient and peg feeds and supplies provided by [facility], on  bolus  feed and VNS  for wound care, OT/PT, speech therapy,  and HHA  services 4x5 days provided by [facility] [place]. "     ADVANCE DIRECTIVES:    DNR  MOLST  [ ]  Living Will  [ ]   DECISION MAKER(s):  [ ] Health Care Proxy(s)  [ ] Surrogate(s)  [ ] Guardian           Name(s): Phone Number(s):    BASELINE (I)ADL(s) (prior to admission):  Queen: [ ]Total  [ ] Moderate [ x]Dependent    Allergies    ASA; dye contrast (Anaphylaxis)  aspirin (Short breath)  divalproex sodium (Other (Unknown))  Haldol (Other (Unknown))  penicillin (Short breath; Rash)  sulfa drugs (Short breath; Rash)  vancomycin (Rash; Urticaria; Hives)  Xanax (Other (Unknown))    Intolerances    MEDICATIONS  (STANDING):  ALBUTerol/ipratropium for Nebulization 3 milliLiter(s) Nebulizer every 6 hours  apixaban 5 milliGRAM(s) Oral every 12 hours  AQUAPHOR (petrolatum Ointment) 1 Application(s) Topical three times a day  buDESOnide 160 MICROgram(s)/formoterol 4.5 MICROgram(s) Inhaler 2 Puff(s) Inhalation two times a day  clobetasol 0.05% Ointment 1 Application(s) Topical two times a day  clonazePAM Tablet 0.5 milliGRAM(s) Oral at bedtime  dextrose 5%. 1000 milliLiter(s) (50 mL/Hr) IV Continuous <Continuous>  dextrose 50% Injectable 12.5 Gram(s) IV Push once  dextrose 50% Injectable 25 Gram(s) IV Push once  dextrose 50% Injectable 25 Gram(s) IV Push once  ferrous    sulfate Liquid 300 milliGRAM(s) Enteral Tube daily  gabapentin   Solution 300 milliGRAM(s) Oral two times a day  insulin lispro (HumaLOG) corrective regimen sliding scale   SubCutaneous every 6 hours  insulin NPH human recombinant 2 Unit(s) SubCutaneous <User Schedule>  lactobacillus acidophilus 1 Tablet(s) Oral daily  lisinopril 5 milliGRAM(s) Oral daily  medical marijuana oil 1 milliLiter(s),medical marijuana oil 1 milliLiter(s),medical marijuana oil 1 milliLiter(s) 1 milliLiter(s) SubLingual <User Schedule>  mirtazapine 7.5 milliGRAM(s) Oral at bedtime  pantoprazole   Suspension 40 milliGRAM(s) Oral before breakfast  predniSONE  Solution 7.5 milliGRAM(s) Enteral Tube daily  QUEtiapine 25 milliGRAM(s) Oral at bedtime  sodium chloride 0.9%. 1000 milliLiter(s) (40 mL/Hr) IV Continuous <Continuous>  tiZANidine 4 milliGRAM(s) Oral <User Schedule>    MEDICATIONS  (PRN):  acetaminophen    Suspension .. 680 milliGRAM(s) Oral every 6 hours PRN Temp greater or equal to 38C (100.4F), Mild Pain (1 - 3)  dextrose 40% Gel 15 Gram(s) Oral once PRN Blood Glucose LESS THAN 70 milliGRAM(s)/deciliter  glucagon  Injectable 1 milliGRAM(s) IntraMuscular once PRN Glucose LESS THAN 70 milligrams/deciliter  ipratropium    for Nebulization 500 MICROGram(s) Nebulizer every 6 hours PRN Respiratory Distress  nystatin Powder 1 Application(s) Topical two times a day PRN rash/itchiness    PRESENT SYMPTOMS: [ ]Unable to obtain due to poor mentation   Source if other than patient:  [ ]Family   [ ]Team     Pain (Impact on QOL):    Location -         Minimal acceptable level (0-10 scale):                    Aggravating factors -  Quality -  Radiation -  Severity (0-10 scale) -    Timing -    PAIN AD Score:     [email or web address]/[email or web address] (press ctrl +  left click to view)    Dyspnea:                           [ ]Mild [ ]Moderate [ ]Severe  Anxiety:                             [ ]Mild [ ]Moderate [ ]Severe  Fatigue:                             [ ]Mild [ ]Moderate [ ]Severe  Nausea:                             [ ]Mild [ ]Moderate [ ]Severe  Loss of appetite:              [ ]Mild [ ]Moderate [ ]Severe  Constipation:                    [ ]Mild [ ]Moderate [ ]Severe    Other Symptoms:  [ ]All other review of systems negative     Karnofsky Performance Score/Palliative Performance Status Version 2:         %    http://palliative.info/resource_material/PPSv2.pdf  PHYSICAL EXAM:  Vital Signs Last 24 Hrs  T(C): 37.9 (11 Jan 2019 06:14), Max: 37.9 (11 Jan 2019 06:14)  T(F): 100.3 (11 Jan 2019 06:14), Max: 100.3 (11 Jan 2019 06:14)  HR: 87 (11 Jan 2019 06:14) (87 - 95)  BP: 114/66 (11 Jan 2019 06:14) (96/56 - 114/66)  BP(mean): --  RR: 18 (11 Jan 2019 06:14) (18 - 18)  SpO2: 97% (11 Jan 2019 06:14) (95% - 97%) I&O's Summary    10 Anuel 2019 07:01  -  11 Jan 2019 07:00  --------------------------------------------------------  IN: 500 mL / OUT: 1230 mL / NET: -730 mL    GENERAL:  [ ]Alert  [ ]Oriented x   [ ]Lethargic  [ ]Cachexia  [ ]Unarousable  [ ]Verbal  [ ]Non-Verbal  Behavioral:   [ ] Anxiety  [ ] Delirium [ ] Agitation [ ] Other  HEENT:  [ ]Normal   [ ]Dry mouth   [ ]ET Tube/Trach  [ ]Oral lesions  PULMONARY:   [ ]Clear [ ]Tachypnea  [ ]Audible excessive secretions   [ ]Rhonchi        [ ]Right [ ]Left [ ]Bilateral  [ ]Crackles        [ ]Right [ ]Left [ ]Bilateral  [ ]Wheezing     [ ]Right [ ]Left [ ]Bilateral  CARDIOVASCULAR:    [ ]Regular [ ]Irregular [ ]Tachy  [ ]Sourav [ ]Murmur [ ]Other  GASTROINTESTINAL:  [ ]Soft  [ ]Distended   [ ]+BS  [ ]Non tender [ ]Tender  [ ]PEG [ ]OGT/ NGT  Last BM: 1/11/19  GENITOURINARY:  [ ]Normal [ ] Incontinent   [ ]Oliguria/Anuria   [ ]Gavin  MUSCULOSKELETAL:   [ ]Normal   [ ]Weakness  [ ]Bed/Wheelchair bound [ ]Edema  NEUROLOGIC:   [ ]No focal deficits  [ ] Cognitive impairment  [ ] Dysphagia [ ]Dysarthria [ ] Paresis [ ]Other   SKIN:   [ ]Normal   [ x]Pressure ulcer(s)m Sacrum stage III, Lumbar spine stage II for details please see nursing flow sheets  [ ]Rash    CRITICAL CARE:  [ ] Shock Present  [ ]Septic [ ]Cardiogenic [ ]Neurologic [ ]Hypovolemic  [ ]  Vasopressors [ ]  Inotropes   [ ] Respiratory failure present  [ ] Acute  [ ] Chronic [ ] Hypoxic  [ ] Hypercarbic [ ] Other  [ ] Other organ failure     LABS:                        9.8    13.56 )-----------( 368      ( 10 Anuel 2019 07:22 )             30.2   01-10    135  |  96  |  42<H>  ----------------------------<  116<H>  4.5   |  23  |  0.57    Ca    9.9      10 Anuel 2019 05:44          RADIOLOGY & ADDITIONAL STUDIES:    PROTEIN CALORIE MALNUTRITION PRESENT: [ ] Yes [ ] No  [ ] PPSV2 < or = to 30% [ ] significant weight loss  [ ] poor nutritional intake [ ] catabolic state [ ] anasarca     Albumin, Serum: 3.9 g/dL (12-22-18 @ 06:30)  Artificial Nutrition [ ]     REFERRALS:   [ ]Chaplaincy  [ ] Hospice  [ ]Child Life  [ ]Social Work  [ ]Case management [ ]Holistic Therapy   Goals of Care Discussion Document: HPI:  68F with advanced dementia (nonverbal, dysphagia with PEG tube, bedbound- functional quadriplegia, chronic gavin catheter in place), sacral pressure ulcer stage 3, heel pressure ulcers, asthma on prednisone, HLD, CVA, UC, right prosthetic hip MRSA infection in 7/2018 s/p pacer in place, recent admission for treatment of UTI and aspiration pneumonia, Systolic HF with biventricular disfunction (EF 26%, Mod MR/AR), Severe pulmonary HT,  presenting from home with increased proBNP. On admission, her family indicated the patient's breathing was normal (somewhat SOB at baseline). They denied fevers, chills, coughing, skin rashes. She is current treated and evaluated for fever (unclear etiology) and DVT. Palliative care was called for GOC in the setting of advanced illness and readmissions.     The patient was seen and examined with her  and daughter by her beside. The patient was minimally verbal and lethargic. She was constantly scratching her skin and she was also noted to have episodic tremor of her left foot.      PERTINENT PM/SXH:   CVA (cerebral vascular accident)  Fatty pancreas  Fatty liver  PNA (pneumonia)  Pulmonary HTN  IGT (impaired glucose tolerance)  Ulcerative colitis  Acid reflux  Anxiety  Depression  Mouth sores  HLD (hyperlipidemia)  Asthma    Humeral head fracture  H/O: hysterectomy  H/O cataract extraction, left  History of knee replacement    FAMILY HISTORY:  Family history of dementia (Grandparent)  Family history of colon cancer (Grandparent)    ITEMS NOT CHECKED ARE NOT PRESENT    SOCIAL HISTORY:   Significant other/partner:   [ ]  Children:3  [ ]  Latter day/Spirituality: Synagogue   Substance hx:  [ ]   Tobacco hx:  [ ]   Alcohol hx: [ ]   Home Opioid hx:  [ ] I-Stop Reference No: Patient is no Medical Marijuana AM 4.75 mg THC and 0.25 mg CBD/1 ml x 1 ml, afternoon 2.5 mg THC and 2.5 mg CBD/1 ml x 1 ml, HS 0.05 TCH and 5 mg CBD/1ml x 1 ml.   Living Situation: [x ]Home  [ ]Long term care  [ ]Rehab [ ]Other  As per case management notes: "LMSW met at bedside with patient's spouse-Kimberly Colon to provide supportive  counseling.  Patient's spouse extremely pleasant and talkative in good spirits  states patient was more alert and sassy over the last couple of days.  Spouse  reports good communication with medical team.  Patient's spouse had a few  questions which LMSW related to RN and nutrition.  Spouse is hopeful for  patient to return home in near future when medically cleared.  Patient lives  with spouse in their [facility] private home and family provides around the  clock care for patient and peg feeds and supplies provided by [facility], on  bolus  feed and VNS  for wound care, OT/PT, speech therapy,  and HHA  services 4h day x5 days provided by [facility] [place]. "     Dr Bolden is her house calls    ADVANCE DIRECTIVES:    DNR  MOLST  [ ]  Living Will  [ ]   DECISION MAKER(s):  [ ] Health Care Proxy(s)  [x ] Surrogate(s)  [ ] Guardian           Name(s): Phone Number(s):      BASELINE (I)ADL(s) (prior to admission):  Nicholas: [ ]Total  [ ] Moderate [ x]Dependent    Allergies    ASA; dye contrast (Anaphylaxis)  aspirin (Short breath)  divalproex sodium (Other (Unknown))  Haldol (Other (Unknown))  penicillin (Short breath; Rash)  sulfa drugs (Short breath; Rash)  vancomycin (Rash; Urticaria; Hives)  Xanax (Other (Unknown))    Intolerances    MEDICATIONS  (STANDING):  ALBUTerol/ipratropium for Nebulization 3 milliLiter(s) Nebulizer every 6 hours  apixaban 5 milliGRAM(s) Oral every 12 hours  AQUAPHOR (petrolatum Ointment) 1 Application(s) Topical three times a day  buDESOnide 160 MICROgram(s)/formoterol 4.5 MICROgram(s) Inhaler 2 Puff(s) Inhalation two times a day  clobetasol 0.05% Ointment 1 Application(s) Topical two times a day  clonazePAM Tablet 0.5 milliGRAM(s) Oral at bedtime  dextrose 5%. 1000 milliLiter(s) (50 mL/Hr) IV Continuous <Continuous>  dextrose 50% Injectable 12.5 Gram(s) IV Push once  dextrose 50% Injectable 25 Gram(s) IV Push once  dextrose 50% Injectable 25 Gram(s) IV Push once  ferrous    sulfate Liquid 300 milliGRAM(s) Enteral Tube daily  gabapentin   Solution 300 milliGRAM(s) Oral two times a day  insulin lispro (HumaLOG) corrective regimen sliding scale   SubCutaneous every 6 hours  insulin NPH human recombinant 2 Unit(s) SubCutaneous <User Schedule>  lactobacillus acidophilus 1 Tablet(s) Oral daily  lisinopril 5 milliGRAM(s) Oral daily  medical marijuana oil 1 milliLiter(s),medical marijuana oil 1 milliLiter(s),medical marijuana oil 1 milliLiter(s) 1 milliLiter(s) SubLingual <User Schedule>  mirtazapine 7.5 milliGRAM(s) Oral at bedtime  pantoprazole   Suspension 40 milliGRAM(s) Oral before breakfast  predniSONE  Solution 7.5 milliGRAM(s) Enteral Tube daily  QUEtiapine 25 milliGRAM(s) Oral at bedtime  sodium chloride 0.9%. 1000 milliLiter(s) (40 mL/Hr) IV Continuous <Continuous>  tiZANidine 4 milliGRAM(s) Oral <User Schedule>    MEDICATIONS  (PRN):  acetaminophen    Suspension .. 680 milliGRAM(s) Oral every 6 hours PRN Temp greater or equal to 38C (100.4F), Mild Pain (1 - 3)  dextrose 40% Gel 15 Gram(s) Oral once PRN Blood Glucose LESS THAN 70 milliGRAM(s)/deciliter  glucagon  Injectable 1 milliGRAM(s) IntraMuscular once PRN Glucose LESS THAN 70 milligrams/deciliter  ipratropium    for Nebulization 500 MICROGram(s) Nebulizer every 6 hours PRN Respiratory Distress  nystatin Powder 1 Application(s) Topical two times a day PRN rash/itchiness    PRESENT SYMPTOMS: [x ]Unable to obtain due to poor mentation   Source if other than patient:  [ ]Family   [ ]Team     Pain (Impact on QOL):    Location -         Minimal acceptable level (0-10 scale):                    Aggravating factors -  Quality -  Radiation -  Severity (0-10 scale) -    Timing -    PAIN AD Score:     [email or web address]/[email or web address] (press ctrl +  left click to view)    Dyspnea:                           [ ]Mild [ ]Moderate [ ]Severe  Anxiety:                             [ ]Mild [ ]Moderate [ ]Severe  Fatigue:                             [ ]Mild [ ]Moderate [ ]Severe  Nausea:                             [ ]Mild [ ]Moderate [ ]Severe  Loss of appetite:              [ ]Mild [ ]Moderate [ ]Severe  Constipation:                    [ ]Mild [ ]Moderate [ ]Severe    Other Symptoms:  [ ]All other review of systems negative     Karnofsky Performance Score/Palliative Performance Status Version 2:    20     %    http://palliative.info/resource_material/[email or web address].pdf  PHYSICAL EXAM:  Vital Signs Last 24 Hrs  T(C): 37.9 (11 Jan 2019 06:14), Max: 37.9 (11 Jan 2019 06:14)  T(F): 100.3 (11 Jan 2019 06:14), Max: 100.3 (11 Jan 2019 06:14)  HR: 87 (11 Jan 2019 06:14) (87 - 95)  BP: 114/66 (11 Jan 2019 06:14) (96/56 - 114/66)  BP(mean): --  RR: 18 (11 Jan 2019 06:14) (18 - 18)  SpO2: 97% (11 Jan 2019 06:14) (95% - 97%) I&O's Summary    10 Anuel 2019 07:01  -  11 Jan 2019 07:00  --------------------------------------------------------  IN: 500 mL / OUT: 1230 mL / NET: -730 mL    GENERAL:  [ ]Alert  [ ]Oriented x   [x ]Lethargic  [x ]Cachexia  [ ]Unarousable  [x ]Minimally Verbal  [ ]Non-Verbal  Behavioral:   [ ] Anxiety  [ ] Delirium [ ] Agitation [ ] Other  HEENT:  [ ]Normal   [x ]Dry mouth   [ ]ET Tube/Trach  [ ]Oral lesions  PULMONARY:   [x ]Clear [ ]Tachypnea  [ ]Audible excessive secretions   [ ]Rhonchi        [ ]Right [ ]Left [ ]Bilateral  [ ]Crackles        [ ]Right [ ]Left [ ]Bilateral  [ ]Wheezing     [ ]Right [ ]Left [ ]Bilateral  CARDIOVASCULAR:    [x ]Regular [ ]Irregular [ ]Tachy  [ ]Sourav [ ]Murmur [ ]Other  GASTROINTESTINAL:  [x ]Soft  [ ]Distended   [ ]+BS  [ ]Non tender [ ]Tender  [ ]PEG [ ]OGT/ NGT  Last BM: 1/11/19  GENITOURINARY:  [ ]Normal [x ] Incontinent   [ ]Oliguria/Anuria   [ ]Gavin  MUSCULOSKELETAL:   [ ]Normal   [ x]Weakness  [ ]Bed/Wheelchair bound [ ]Edema  NEUROLOGIC:   [ ]No focal deficits  [x ] Cognitive impairment (FAST 7c)  [ ] Dysphagia [ ]Dysarthria [ ] Paresis [ ]Other   SKIN:   [ ]Normal   [ x]Pressure ulcer(s)m Sacrum stage III, Lumbar spine stage II for details please see nursing flow sheets  [ ]Rash    CRITICAL CARE:  [ ] Shock Present  [ ]Septic [ ]Cardiogenic [ ]Neurologic [ ]Hypovolemic  [ ]  Vasopressors [ ]  Inotropes   [ ] Respiratory failure present  [ ] Acute  [ ] Chronic [ ] Hypoxic  [ ] Hypercarbic [ ] Other  [ ] Other organ failure     LABS:                        9.8    13.56 )-----------( 368      ( 10 Anuel 2019 07:22 )             30.2   01-10    135  |  96  |  42<H>  ----------------------------<  116<H>  4.5   |  23  |  0.57    Ca    9.9      10 Anuel 2019 05:44          RADIOLOGY & ADDITIONAL STUDIES:    < from: CT Abdomen and Pelvis No Cont (12.22.18 @ 15:14) >  EXAM:  CT ABDOMEN AND PELVIS                            PROCEDURE DATE:  12/22/2018    IMPRESSION:    No acute intra-abdominal pathology or collection.                  AMIRA GUTIERREZ M.D., RADIOLOGY RESIDENT  This document has been electronically signed.  MARKEL SIMMONS M.D., ATTENDING RADIOLOGIST  This document has been electronically signed. Dec 22 2018  4:52PM    < end of copied text >    < from: CT Chest No Cont (12.31.18 @ 10:09) >  EXAM:  CT CHEST                            PROCEDURE DATE:  12/31/2018  MPRESSION:   No pneumonia.                      AMIRA GUTIERREZ M.D., RADIOLOGY RESIDENT  This document has been electronically signed.  CHASITY BANERJEE M.D., ATTENDING RADIOLOGIST  This document has been electronically signed. Dec 31 2018 12:10PM    < end of copied text >    PROTEIN CALORIE MALNUTRITION PRESENT: [ ] Yes [ ] No  [ ] PPSV2 < or = to 30% [ ] significant weight loss  [ ] poor nutritional intake [ ] catabolic state [ ] anasarca     Albumin, Serum: 3.9 g/dL (12-22-18 @ 06:30)  Artificial Nutrition [ ]     REFERRALS:   [ ]Chaplaincy  [ ] Hospice  [ ]Child Life  [ ]Social Work  [ ]Case management [ ]Holistic Therapy   Goals of Care Discussion Document:

## 2019-01-11 NOTE — CONSULT NOTE ADULT - PROBLEM SELECTOR RECOMMENDATION 9
FAST 7c.   GOC: Met with the patient's  and daughter that gave verbalized understanding about the patient's illnesses (Advanced Dementia, DVT) and poor prognosis. Her  indicated that current goals were toward taking all possible measures to prolong the patient's life. He was looking for resources to provide advanced medical care in her house (e.g, being able to detect hypotension in a timely manner so she is able to get IVF before she goes into a more difficult situation). I advised him to d/w Dr Escobar to see if he is able to work with home care in order to provide IVF at home. I also advised to think about hiring a RN to help with monitoring and Rx. Patient is full code.   Hospice is not aligned with this patient's current goals.  50' were spent in ACP.

## 2019-01-11 NOTE — CONSULT NOTE ADULT - PROBLEM SELECTOR RECOMMENDATION 4
[facility] as above.   Patient's  was not interested on chaplaincy services at this time.  Earlene d/w Dr. Bolden.

## 2019-01-11 NOTE — PROGRESS NOTE ADULT - SUBJECTIVE AND OBJECTIVE BOX
CC: f/u for  fever  Patient reports  nothing intelligible  REVIEW OF SYSTEMS:  All other review of systems negative (Comprehensive ROS)    Antimicrobials Day #  :    Other Medications Reviewed    T(F): 97.9 (01-11-19 @ 21:17), Max: 100.3 (01-11-19 @ 06:14)  HR: 96 (01-11-19 @ 21:17)  BP: 109/68 (01-11-19 @ 21:17)  RR: 18 (01-11-19 @ 21:17)  SpO2: 98% (01-11-19 @ 21:17)  Wt(kg): --    PHYSICAL EXAM:  General: more alert, no acute distress  Eyes:  anicteric, no conjunctival injection, no discharge  Oropharynx: no lesions or injection 	  Neck: supple, without adenopathy  Lungs: clear to auscultation  Heart: regular rate and rhythm; no murmur, rubs or gallops  Abdomen: soft, nondistended, nontender, without mass or organomegaly, peg  Skin: no lesions  Extremities: no clubbing, cyanosis, or edema  Neurologic: confused, not following , babbles    LAB RESULTS:                        9.8    13.56 )-----------( 368      ( 10 Anuel 2019 07:22 )             30.2     01-10    135  |  96  |  42<H>  ----------------------------<  116<H>  4.5   |  23  |  0.57    Ca    9.9      10 Anuel 2019 05:44          MICROBIOLOGY:  RECENT CULTURES:  01-09 @ 10:05 .Blood Blood     No growth to date.      01-07 @ 08:36 .Blood Blood-Peripheral     No growth to date.          RADIOLOGY REVIEWED:    < from: VA Duplex Lower Ext Vein Scan, Right (01.03.19 @ 19:09) >  mpression: There is persistent thrombus in the right femoral, popliteal   and gastrocnemius veins. Resolution of thrombus is seen in the right   common femoral vein. The right posterior tibial and peroneal veins not   visualized.      < end of copied text >    Assessment:  patient with dementia, mrsa thr infection s;p leisa, spacer, peg for dysphagia, gavin for retention, recurrent tx for aspiration and uti with ongoing low grade temps despite various abx and stopping minocycline for possible drug fever, Imaging , cultures and exam unrevealing. Has resolving dvt on anticoag so not likely source. No infection is overtly apparent  Plan:  Monitor off abx  I would not object to d/c

## 2019-01-11 NOTE — CONSULT NOTE ADULT - ASSESSMENT
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 On admission, her family indicated the patient's breathing was normal (somewhat SOB at baseline). They denied fevers, chills, coughing, skin rashes. She is current treated and evaluated for fever (unclear etiology) and DVT. She has also developed pruritus of unknown etiology. Palliative care was called for GOC in the setting of advanced illness and readmissions.

## 2019-01-11 NOTE — CONSULT NOTE ADULT - PROBLEM SELECTOR RECOMMENDATION 2
Will start Sarna cream bid  Continue Gabapentin   I don't know if Zoloft may have been working as a medication for pruritus and now that the patient is off it, this symptoms may have increased.

## 2019-01-11 NOTE — PROGRESS NOTE ADULT - SUBJECTIVE AND OBJECTIVE BOX
called to see patient with leaking gavin.  16F gavin in place, draining clear yellow urine.  bladder irrigated with sterile water. no resistance or leakage noted.  able to aspirate full volume of irrigant.    no sign of leaking or obstruction.  prior leaking episode may be secondary to kinked gavin or bladder spasm

## 2019-01-12 LAB
ANION GAP SERPL CALC-SCNC: 16 MMOL/L — SIGNIFICANT CHANGE UP (ref 5–17)
BUN SERPL-MCNC: 30 MG/DL — HIGH (ref 7–23)
CALCIUM SERPL-MCNC: 9.7 MG/DL — SIGNIFICANT CHANGE UP (ref 8.4–10.5)
CHLORIDE SERPL-SCNC: 98 MMOL/L — SIGNIFICANT CHANGE UP (ref 96–108)
CO2 SERPL-SCNC: 25 MMOL/L — SIGNIFICANT CHANGE UP (ref 22–31)
CREAT SERPL-MCNC: 0.54 MG/DL — SIGNIFICANT CHANGE UP (ref 0.5–1.3)
CULTURE RESULTS: SIGNIFICANT CHANGE UP
CULTURE RESULTS: SIGNIFICANT CHANGE UP
DSDNA AB SER-ACNC: <12 IU/ML — SIGNIFICANT CHANGE UP
GLUCOSE BLDC GLUCOMTR-MCNC: 101 MG/DL — HIGH (ref 70–99)
GLUCOSE BLDC GLUCOMTR-MCNC: 129 MG/DL — HIGH (ref 70–99)
GLUCOSE BLDC GLUCOMTR-MCNC: 196 MG/DL — HIGH (ref 70–99)
GLUCOSE BLDC GLUCOMTR-MCNC: 83 MG/DL — SIGNIFICANT CHANGE UP (ref 70–99)
GLUCOSE BLDC GLUCOMTR-MCNC: 90 MG/DL — SIGNIFICANT CHANGE UP (ref 70–99)
GLUCOSE SERPL-MCNC: 102 MG/DL — HIGH (ref 70–99)
HCT VFR BLD CALC: 30.8 % — LOW (ref 34.5–45)
HGB BLD-MCNC: 10.2 G/DL — LOW (ref 11.5–15.5)
MCHC RBC-ENTMCNC: 31.5 PG — SIGNIFICANT CHANGE UP (ref 27–34)
MCHC RBC-ENTMCNC: 33.1 GM/DL — SIGNIFICANT CHANGE UP (ref 32–36)
MCV RBC AUTO: 95.1 FL — SIGNIFICANT CHANGE UP (ref 80–100)
PLATELET # BLD AUTO: 379 K/UL — SIGNIFICANT CHANGE UP (ref 150–400)
POTASSIUM SERPL-MCNC: 4.3 MMOL/L — SIGNIFICANT CHANGE UP (ref 3.5–5.3)
POTASSIUM SERPL-SCNC: 4.3 MMOL/L — SIGNIFICANT CHANGE UP (ref 3.5–5.3)
RBC # BLD: 3.24 M/UL — LOW (ref 3.8–5.2)
RBC # FLD: 15.5 % — HIGH (ref 10.3–14.5)
SODIUM SERPL-SCNC: 139 MMOL/L — SIGNIFICANT CHANGE UP (ref 135–145)
SPECIMEN SOURCE: SIGNIFICANT CHANGE UP
SPECIMEN SOURCE: SIGNIFICANT CHANGE UP
WBC # BLD: 15.97 K/UL — HIGH (ref 3.8–10.5)
WBC # FLD AUTO: 15.97 K/UL — HIGH (ref 3.8–10.5)

## 2019-01-12 RX ORDER — INSULIN GLARGINE 100 [IU]/ML
1 INJECTION, SOLUTION SUBCUTANEOUS ONCE
Qty: 0 | Refills: 0 | Status: COMPLETED | OUTPATIENT
Start: 2019-01-12 | End: 2019-01-12

## 2019-01-12 RX ADMIN — Medication 300 MILLIGRAM(S): at 12:15

## 2019-01-12 RX ADMIN — Medication 3 MILLILITER(S): at 12:15

## 2019-01-12 RX ADMIN — Medication 1 APPLICATION(S): at 06:35

## 2019-01-12 RX ADMIN — Medication 680 MILLIGRAM(S): at 00:00

## 2019-01-12 RX ADMIN — Medication 1 APPLICATION(S): at 12:17

## 2019-01-12 RX ADMIN — APIXABAN 5 MILLIGRAM(S): 2.5 TABLET, FILM COATED ORAL at 17:42

## 2019-01-12 RX ADMIN — Medication 680 MILLIGRAM(S): at 06:46

## 2019-01-12 RX ADMIN — Medication 1 TABLET(S): at 12:16

## 2019-01-12 RX ADMIN — Medication 1 APPLICATION(S): at 21:59

## 2019-01-12 RX ADMIN — MIRTAZAPINE 7.5 MILLIGRAM(S): 45 TABLET, ORALLY DISINTEGRATING ORAL at 22:00

## 2019-01-12 RX ADMIN — Medication 7.5 MILLIGRAM(S): at 06:15

## 2019-01-12 RX ADMIN — MEN-PHOR 1 APPLICATION(S): .5; .5 LOTION TOPICAL at 06:15

## 2019-01-12 RX ADMIN — APIXABAN 5 MILLIGRAM(S): 2.5 TABLET, FILM COATED ORAL at 06:14

## 2019-01-12 RX ADMIN — LISINOPRIL 5 MILLIGRAM(S): 2.5 TABLET ORAL at 06:14

## 2019-01-12 RX ADMIN — QUETIAPINE FUMARATE 25 MILLIGRAM(S): 200 TABLET, FILM COATED ORAL at 22:00

## 2019-01-12 RX ADMIN — BUDESONIDE AND FORMOTEROL FUMARATE DIHYDRATE 2 PUFF(S): 160; 4.5 AEROSOL RESPIRATORY (INHALATION) at 09:34

## 2019-01-12 RX ADMIN — Medication 1 APPLICATION(S): at 17:43

## 2019-01-12 RX ADMIN — PANTOPRAZOLE SODIUM 40 MILLIGRAM(S): 20 TABLET, DELAYED RELEASE ORAL at 06:16

## 2019-01-12 RX ADMIN — GABAPENTIN 300 MILLIGRAM(S): 400 CAPSULE ORAL at 06:15

## 2019-01-12 RX ADMIN — FENTANYL CITRATE 1 PATCH: 50 INJECTION INTRAVENOUS at 08:18

## 2019-01-12 RX ADMIN — GABAPENTIN 300 MILLIGRAM(S): 400 CAPSULE ORAL at 17:43

## 2019-01-12 RX ADMIN — Medication 1: at 12:12

## 2019-01-12 RX ADMIN — BUDESONIDE AND FORMOTEROL FUMARATE DIHYDRATE 2 PUFF(S): 160; 4.5 AEROSOL RESPIRATORY (INHALATION) at 22:01

## 2019-01-12 RX ADMIN — Medication 3 MILLILITER(S): at 17:42

## 2019-01-12 RX ADMIN — MEN-PHOR 1 APPLICATION(S): .5; .5 LOTION TOPICAL at 17:43

## 2019-01-12 RX ADMIN — TIZANIDINE 4 MILLIGRAM(S): 4 TABLET ORAL at 22:00

## 2019-01-12 RX ADMIN — INSULIN GLARGINE 1 UNIT(S): 100 INJECTION, SOLUTION SUBCUTANEOUS at 06:59

## 2019-01-12 RX ADMIN — TIZANIDINE 4 MILLIGRAM(S): 4 TABLET ORAL at 09:33

## 2019-01-12 RX ADMIN — Medication 1 APPLICATION(S): at 06:14

## 2019-01-12 RX ADMIN — Medication 0.5 MILLIGRAM(S): at 22:00

## 2019-01-12 NOTE — PROGRESS NOTE ADULT - ASSESSMENT
fever  continue to monitor  dvt  continue eliquis   dementia continue meds for depression and anxiety   chronic pain  continue medical marijuana

## 2019-01-12 NOTE — PROGRESS NOTE ADULT - SUBJECTIVE AND OBJECTIVE BOX
---___---___---___---___---___---___ ---___---___---___---___---___---___---___---___---___---                    <<<  M E D I C A L   A T T E N D I N G    F O L L O W    U P   N O T E  >>>    still with fever 100.3 at night      ---___---___---___---___---___---      <<<  MEDICATIONS:  >>>    MEDICATIONS  (STANDING):  ALBUTerol/ipratropium for Nebulization 3 milliLiter(s) Nebulizer every 6 hours  apixaban 5 milliGRAM(s) Oral every 12 hours  AQUAPHOR (petrolatum Ointment) 1 Application(s) Topical three times a day  buDESOnide 160 MICROgram(s)/formoterol 4.5 MICROgram(s) Inhaler 2 Puff(s) Inhalation two times a day  camphor 0.5%/menthol 0.5% Topical Lotion 1 Application(s) Topical two times a day  clobetasol 0.05% Ointment 1 Application(s) Topical two times a day  clonazePAM Tablet 0.5 milliGRAM(s) Oral at bedtime  dextrose 5%. 1000 milliLiter(s) (50 mL/Hr) IV Continuous <Continuous>  dextrose 50% Injectable 12.5 Gram(s) IV Push once  dextrose 50% Injectable 25 Gram(s) IV Push once  dextrose 50% Injectable 25 Gram(s) IV Push once  ferrous    sulfate Liquid 300 milliGRAM(s) Enteral Tube daily  gabapentin   Solution 300 milliGRAM(s) Oral two times a day  insulin lispro (HumaLOG) corrective regimen sliding scale   SubCutaneous every 6 hours  lactobacillus acidophilus 1 Tablet(s) Oral daily  lisinopril 5 milliGRAM(s) Oral daily  mirtazapine 7.5 milliGRAM(s) Oral at bedtime  pantoprazole   Suspension 40 milliGRAM(s) Oral before breakfast  predniSONE  Solution 7.5 milliGRAM(s) Enteral Tube daily  QUEtiapine 25 milliGRAM(s) Oral at bedtime  sodium chloride 0.9%. 1000 milliLiter(s) (40 mL/Hr) IV Continuous <Continuous>  tiZANidine 4 milliGRAM(s) Oral <User Schedule>      MEDICATIONS  (PRN):  acetaminophen    Suspension .. 680 milliGRAM(s) Oral every 6 hours PRN Temp greater or equal to 38C (100.4F), Mild Pain (1 - 3)  dextrose 40% Gel 15 Gram(s) Oral once PRN Blood Glucose LESS THAN 70 milliGRAM(s)/deciliter  glucagon  Injectable 1 milliGRAM(s) IntraMuscular once PRN Glucose LESS THAN 70 milligrams/deciliter  ipratropium    for Nebulization 500 MICROGram(s) Nebulizer every 6 hours PRN Respiratory Distress  nystatin Powder 1 Application(s) Topical two times a day PRN rash/itchiness       ---___---___---___---___---___---     <<<REVIEW OF SYSTEM: >>>     unable to obtain      ---___---___---___---___---___---          <<<  VITAL SIGNS: >>>    T(F): 98 (01-12-19 @ 14:59), Max: 100.3 (01-11-19 @ 23:25)  HR: 102 (01-12-19 @ 14:59) (85 - 102)  BP: 110/68 (01-12-19 @ 14:59) (109/68 - 111/72)  RR: 18 (01-12-19 @ 14:59) (18 - 18)  SpO2: 97% (01-12-19 @ 14:59) (93% - 98%)  Wt(kg): --  CAPILLARY BLOOD GLUCOSE      POCT Blood Glucose.: 196 mg/dL (12 Jan 2019 12:02)    I&O's Summary    11 Jan 2019 07:01  -  12 Jan 2019 07:00  --------------------------------------------------------  IN: 0 mL / OUT: 1000 mL / NET: -1000 mL         ---___---___---___---___---___---                       PHYSICAL EXAM:    GEN: A&O X 3 , NAD , comfortable  HEENT: NCAT, PERRL, MMM, no scleral icterus, hearing intact  NECK: Supple, No JVD  CVS: S1S2 , regular , No M/R/G appreciated  PULM: CTA B/L,  no W/R/R appreciated  ABD.: soft. non tender, non distended,  bowel sounds present peg noted   Extrem: intact pulses , no edema noted  Derm: sacral decubitus noted         ---___---___---___---___---___---     <<<  LAB AND IMAGING: >>>                          10.2   15.97 )-----------( 379      ( 12 Jan 2019 07:20 )             30.8               01-12    139  |  98  |  30<H>  ----------------------------<  102<H>  4.3   |  25  |  0.54    Ca    9.7      12 Jan 2019 06:11                                 [All pertinent / recent available Imaging reports and other labs reviewed]     ---___---___---___---___---___---___ ---___---___---___---___---           <<<  A S S E S S M E N T   A N D   P L A N :  >>>          -GI/DVT Prophylaxis.    --------------------------------------------  Case discussed with daughter about realistic goals for the patient will discuss in am with the   Education given on     >>______________________<<      Deniz Bolden .         phone   [phone]

## 2019-01-13 LAB
ANION GAP SERPL CALC-SCNC: 13 MMOL/L — SIGNIFICANT CHANGE UP (ref 5–17)
BUN SERPL-MCNC: 30 MG/DL — HIGH (ref 7–23)
CALCIUM SERPL-MCNC: 10.2 MG/DL — SIGNIFICANT CHANGE UP (ref 8.4–10.5)
CHLORIDE SERPL-SCNC: 98 MMOL/L — SIGNIFICANT CHANGE UP (ref 96–108)
CO2 SERPL-SCNC: 25 MMOL/L — SIGNIFICANT CHANGE UP (ref 22–31)
CREAT SERPL-MCNC: 0.54 MG/DL — SIGNIFICANT CHANGE UP (ref 0.5–1.3)
GLUCOSE BLDC GLUCOMTR-MCNC: 121 MG/DL — HIGH (ref 70–99)
GLUCOSE BLDC GLUCOMTR-MCNC: 121 MG/DL — HIGH (ref 70–99)
GLUCOSE BLDC GLUCOMTR-MCNC: 176 MG/DL — HIGH (ref 70–99)
GLUCOSE SERPL-MCNC: 106 MG/DL — HIGH (ref 70–99)
HCT VFR BLD CALC: 31.2 % — LOW (ref 34.5–45)
HGB BLD-MCNC: 10 G/DL — LOW (ref 11.5–15.5)
MCHC RBC-ENTMCNC: 29.9 PG — SIGNIFICANT CHANGE UP (ref 27–34)
MCHC RBC-ENTMCNC: 32.1 GM/DL — SIGNIFICANT CHANGE UP (ref 32–36)
MCV RBC AUTO: 93.4 FL — SIGNIFICANT CHANGE UP (ref 80–100)
PLATELET # BLD AUTO: 341 K/UL — SIGNIFICANT CHANGE UP (ref 150–400)
POTASSIUM SERPL-MCNC: 4.3 MMOL/L — SIGNIFICANT CHANGE UP (ref 3.5–5.3)
POTASSIUM SERPL-SCNC: 4.3 MMOL/L — SIGNIFICANT CHANGE UP (ref 3.5–5.3)
RBC # BLD: 3.34 M/UL — LOW (ref 3.8–5.2)
RBC # FLD: 15.7 % — HIGH (ref 10.3–14.5)
SODIUM SERPL-SCNC: 136 MMOL/L — SIGNIFICANT CHANGE UP (ref 135–145)
WBC # BLD: 14.2 K/UL — HIGH (ref 3.8–10.5)
WBC # FLD AUTO: 14.2 K/UL — HIGH (ref 3.8–10.5)

## 2019-01-13 RX ORDER — CLONAZEPAM 1 MG
0.5 TABLET ORAL AT BEDTIME
Qty: 0 | Refills: 0 | Status: DISCONTINUED | OUTPATIENT
Start: 2019-01-13 | End: 2019-01-16

## 2019-01-13 RX ADMIN — Medication 1 TABLET(S): at 12:53

## 2019-01-13 RX ADMIN — QUETIAPINE FUMARATE 25 MILLIGRAM(S): 200 TABLET, FILM COATED ORAL at 21:47

## 2019-01-13 RX ADMIN — Medication 3 MILLILITER(S): at 18:35

## 2019-01-13 RX ADMIN — BUDESONIDE AND FORMOTEROL FUMARATE DIHYDRATE 2 PUFF(S): 160; 4.5 AEROSOL RESPIRATORY (INHALATION) at 21:48

## 2019-01-13 RX ADMIN — TIZANIDINE 4 MILLIGRAM(S): 4 TABLET ORAL at 21:48

## 2019-01-13 RX ADMIN — LISINOPRIL 5 MILLIGRAM(S): 2.5 TABLET ORAL at 06:36

## 2019-01-13 RX ADMIN — Medication 7.5 MILLIGRAM(S): at 06:36

## 2019-01-13 RX ADMIN — PANTOPRAZOLE SODIUM 40 MILLIGRAM(S): 20 TABLET, DELAYED RELEASE ORAL at 06:35

## 2019-01-13 RX ADMIN — MIRTAZAPINE 7.5 MILLIGRAM(S): 45 TABLET, ORALLY DISINTEGRATING ORAL at 21:47

## 2019-01-13 RX ADMIN — Medication 300 MILLIGRAM(S): at 12:53

## 2019-01-13 RX ADMIN — MEN-PHOR 1 APPLICATION(S): .5; .5 LOTION TOPICAL at 18:00

## 2019-01-13 RX ADMIN — GABAPENTIN 300 MILLIGRAM(S): 400 CAPSULE ORAL at 06:58

## 2019-01-13 RX ADMIN — Medication 1: at 12:54

## 2019-01-13 RX ADMIN — GABAPENTIN 300 MILLIGRAM(S): 400 CAPSULE ORAL at 18:39

## 2019-01-13 RX ADMIN — Medication 1 APPLICATION(S): at 18:00

## 2019-01-13 RX ADMIN — Medication 0.5 MILLIGRAM(S): at 21:48

## 2019-01-13 RX ADMIN — TIZANIDINE 4 MILLIGRAM(S): 4 TABLET ORAL at 09:38

## 2019-01-13 RX ADMIN — BUDESONIDE AND FORMOTEROL FUMARATE DIHYDRATE 2 PUFF(S): 160; 4.5 AEROSOL RESPIRATORY (INHALATION) at 09:37

## 2019-01-13 RX ADMIN — Medication 1 APPLICATION(S): at 14:46

## 2019-01-13 RX ADMIN — APIXABAN 5 MILLIGRAM(S): 2.5 TABLET, FILM COATED ORAL at 06:36

## 2019-01-13 RX ADMIN — Medication 1 APPLICATION(S): at 21:48

## 2019-01-13 RX ADMIN — Medication 3 MILLILITER(S): at 12:53

## 2019-01-13 RX ADMIN — APIXABAN 5 MILLIGRAM(S): 2.5 TABLET, FILM COATED ORAL at 18:35

## 2019-01-13 NOTE — PROGRESS NOTE ADULT - SUBJECTIVE AND OBJECTIVE BOX
---___---___---___---___---___---___ ---___---___---___---___---___---___---___---___---___---                    <<<  M E D I C A L   A T T E N D I N G    F O L L O W    U P   N O T E  >>>    afebrile wbc trending down    ---___---___---___---___---___---      <<<  MEDICATIONS:  >>>    MEDICATIONS  (STANDING):  ALBUTerol/ipratropium for Nebulization 3 milliLiter(s) Nebulizer every 6 hours  apixaban 5 milliGRAM(s) Oral every 12 hours  AQUAPHOR (petrolatum Ointment) 1 Application(s) Topical three times a day  buDESOnide 160 MICROgram(s)/formoterol 4.5 MICROgram(s) Inhaler 2 Puff(s) Inhalation two times a day  camphor 0.5%/menthol 0.5% Topical Lotion 1 Application(s) Topical two times a day  clobetasol 0.05% Ointment 1 Application(s) Topical two times a day  clonazePAM Tablet 0.5 milliGRAM(s) Oral at bedtime  dextrose 5%. 1000 milliLiter(s) (50 mL/Hr) IV Continuous <Continuous>  dextrose 50% Injectable 12.5 Gram(s) IV Push once  dextrose 50% Injectable 25 Gram(s) IV Push once  dextrose 50% Injectable 25 Gram(s) IV Push once  ferrous    sulfate Liquid 300 milliGRAM(s) Enteral Tube daily  gabapentin   Solution 300 milliGRAM(s) Oral two times a day  insulin lispro (HumaLOG) corrective regimen sliding scale   SubCutaneous every 6 hours  lactobacillus acidophilus 1 Tablet(s) Oral daily  lisinopril 5 milliGRAM(s) Oral daily  mirtazapine 7.5 milliGRAM(s) Oral at bedtime  pantoprazole   Suspension 40 milliGRAM(s) Oral before breakfast  predniSONE  Solution 7.5 milliGRAM(s) Enteral Tube daily  QUEtiapine 25 milliGRAM(s) Oral at bedtime  sodium chloride 0.9%. 1000 milliLiter(s) (40 mL/Hr) IV Continuous <Continuous>  tiZANidine 4 milliGRAM(s) Oral <User Schedule>      MEDICATIONS  (PRN):  acetaminophen    Suspension .. 680 milliGRAM(s) Oral every 6 hours PRN Temp greater or equal to 38C (100.4F), Mild Pain (1 - 3)  dextrose 40% Gel 15 Gram(s) Oral once PRN Blood Glucose LESS THAN 70 milliGRAM(s)/deciliter  glucagon  Injectable 1 milliGRAM(s) IntraMuscular once PRN Glucose LESS THAN 70 milligrams/deciliter  ipratropium    for Nebulization 500 MICROGram(s) Nebulizer every 6 hours PRN Respiratory Distress  nystatin Powder 1 Application(s) Topical two times a day PRN rash/itchiness       ---___---___---___---___---___---     <<<REVIEW OF SYSTEM: >>>          ---___---___---___---___---___---          <<<  VITAL SIGNS: >>>    T(F): 98.7 (01-13-19 @ 14:32), Max: 99 (01-12-19 @ 17:34)  HR: 94 (01-13-19 @ 13:52) (72 - 95)  BP: 120/65 (01-13-19 @ 13:52) (103/67 - 131/62)  RR: 18 (01-13-19 @ 13:52) (16 - 18)  SpO2: 96% (01-13-19 @ 13:52) (96% - 97%)  Wt(kg): --  CAPILLARY BLOOD GLUCOSE      POCT Blood Glucose.: 176 mg/dL (13 Jan 2019 12:01)    I&O's Summary    12 Jan 2019 07:01  -  13 Jan 2019 07:00  --------------------------------------------------------  IN: 600 mL / OUT: 750 mL / NET: -150 mL    13 Jan 2019 07:01  -  13 Jan 2019 15:09  --------------------------------------------------------  IN: 0 mL / OUT: 375 mL / NET: -375 mL         ---___---___---___---___---___---                       PHYSICAL EXAM:    GEN: A&O X 1, NAD , comfortable  HEENT: NCAT, PERRL, MMM, no scleral icterus, hearing intact  NECK: Supple, No JVD  CVS: S1S2 , regular , No M/R/G appreciated  PULM: CTA B/L,  no W/R/R appreciated  ABD.: soft. non tender, non distended,  bowel sounds present peg noted   Extrem: intact pulses , no edema noted  Derm: No rash or ecchymosis noted        ---___---___---___---___---___---     <<<  LAB AND IMAGING: >>>                          10.0   14.20 )-----------( 341      ( 13 Jan 2019 08:34 )             31.2               01-13    136  |  98  |  30<H>  ----------------------------<  106<H>  4.3   |  25  |  0.54    Ca    10.2      13 Jan 2019 05:52                                 [All pertinent / recent available Imaging reports and other labs reviewed]     ---___---___---___---___---___---___ ---___---___---___---___---           <<<  A S S E S S M E N T   A N D   P L A N :  >>>          -GI/DVT Prophylaxis.    --------------------------------------------  Case discussed with   Education given on     >>______________________<<      Deniz Bolden .         phone   [phone]

## 2019-01-13 NOTE — CHART NOTE - NSCHARTNOTEFT_GEN_A_CORE
Full work up for drug induced lupus negative. Negative ANCA, PR3, MPO, histone, DsDNA. Will sign off case, as autoimmune process explaining fevers unlikely. Call back if any questions.     Kartik Warren MD, PGY4  [phone]

## 2019-01-13 NOTE — PROGRESS NOTE ADULT - ASSESSMENT
fever improved   dvt on eliquis   anxiety depression and dementia   continue medication   chf on lisinopril  chronic pain continue fentanyl and dilaudid    start dc planning for home. fever are getting less and less and not as high

## 2019-01-14 LAB
CULTURE RESULTS: SIGNIFICANT CHANGE UP
GLUCOSE BLDC GLUCOMTR-MCNC: 132 MG/DL — HIGH (ref 70–99)
GLUCOSE BLDC GLUCOMTR-MCNC: 139 MG/DL — HIGH (ref 70–99)
GLUCOSE BLDC GLUCOMTR-MCNC: 141 MG/DL — HIGH (ref 70–99)
GLUCOSE BLDC GLUCOMTR-MCNC: 161 MG/DL — HIGH (ref 70–99)
HCT VFR BLD CALC: 30.6 % — LOW (ref 34.5–45)
HGB BLD-MCNC: 9.9 G/DL — LOW (ref 11.5–15.5)
MCHC RBC-ENTMCNC: 30.6 PG — SIGNIFICANT CHANGE UP (ref 27–34)
MCHC RBC-ENTMCNC: 32.4 GM/DL — SIGNIFICANT CHANGE UP (ref 32–36)
MCV RBC AUTO: 94.4 FL — SIGNIFICANT CHANGE UP (ref 80–100)
PLATELET # BLD AUTO: 383 K/UL — SIGNIFICANT CHANGE UP (ref 150–400)
RBC # BLD: 3.24 M/UL — LOW (ref 3.8–5.2)
RBC # FLD: 15.4 % — HIGH (ref 10.3–14.5)
SPECIMEN SOURCE: SIGNIFICANT CHANGE UP
WBC # BLD: 15.81 K/UL — HIGH (ref 3.8–10.5)
WBC # FLD AUTO: 15.81 K/UL — HIGH (ref 3.8–10.5)

## 2019-01-14 RX ADMIN — Medication 1 TABLET(S): at 12:18

## 2019-01-14 RX ADMIN — Medication 1 APPLICATION(S): at 17:08

## 2019-01-14 RX ADMIN — Medication 1 APPLICATION(S): at 21:39

## 2019-01-14 RX ADMIN — Medication 1: at 19:09

## 2019-01-14 RX ADMIN — Medication 0.5 MILLIGRAM(S): at 21:40

## 2019-01-14 RX ADMIN — LISINOPRIL 5 MILLIGRAM(S): 2.5 TABLET ORAL at 11:41

## 2019-01-14 RX ADMIN — TIZANIDINE 4 MILLIGRAM(S): 4 TABLET ORAL at 21:39

## 2019-01-14 RX ADMIN — BUDESONIDE AND FORMOTEROL FUMARATE DIHYDRATE 2 PUFF(S): 160; 4.5 AEROSOL RESPIRATORY (INHALATION) at 12:19

## 2019-01-14 RX ADMIN — GABAPENTIN 300 MILLIGRAM(S): 400 CAPSULE ORAL at 06:04

## 2019-01-14 RX ADMIN — APIXABAN 5 MILLIGRAM(S): 2.5 TABLET, FILM COATED ORAL at 17:08

## 2019-01-14 RX ADMIN — QUETIAPINE FUMARATE 25 MILLIGRAM(S): 200 TABLET, FILM COATED ORAL at 21:39

## 2019-01-14 RX ADMIN — Medication 300 MILLIGRAM(S): at 12:20

## 2019-01-14 RX ADMIN — TIZANIDINE 4 MILLIGRAM(S): 4 TABLET ORAL at 12:18

## 2019-01-14 RX ADMIN — Medication 3 MILLILITER(S): at 12:19

## 2019-01-14 RX ADMIN — APIXABAN 5 MILLIGRAM(S): 2.5 TABLET, FILM COATED ORAL at 06:03

## 2019-01-14 RX ADMIN — Medication 3 MILLILITER(S): at 00:09

## 2019-01-14 RX ADMIN — Medication 1 APPLICATION(S): at 06:04

## 2019-01-14 RX ADMIN — Medication 1 APPLICATION(S): at 12:20

## 2019-01-14 RX ADMIN — MEN-PHOR 1 APPLICATION(S): .5; .5 LOTION TOPICAL at 17:08

## 2019-01-14 RX ADMIN — GABAPENTIN 300 MILLIGRAM(S): 400 CAPSULE ORAL at 17:08

## 2019-01-14 RX ADMIN — PANTOPRAZOLE SODIUM 40 MILLIGRAM(S): 20 TABLET, DELAYED RELEASE ORAL at 06:03

## 2019-01-14 RX ADMIN — Medication 1 APPLICATION(S): at 06:06

## 2019-01-14 RX ADMIN — Medication 7.5 MILLIGRAM(S): at 06:03

## 2019-01-14 RX ADMIN — MIRTAZAPINE 7.5 MILLIGRAM(S): 45 TABLET, ORALLY DISINTEGRATING ORAL at 21:39

## 2019-01-14 RX ADMIN — BUDESONIDE AND FORMOTEROL FUMARATE DIHYDRATE 2 PUFF(S): 160; 4.5 AEROSOL RESPIRATORY (INHALATION) at 21:39

## 2019-01-14 NOTE — PROGRESS NOTE ADULT - ASSESSMENT
fever resolved   dvt continue eliquis  sacral decubitus needs wound care out patient   pt to increase rom of the lower extremities   anxiety on klonopin  depression on remeron   agitation on seroquel  continue budesonide for asthma   on fentanyl for chronic pain and history of spacer placement for hip   lisinopril for chf    will eventually need outpatient home usd of the right lower ext       DC HOME with wound care and pt  also bolus feeds tid     I will BE AWAY  DR. QUIROZ  to cover starting 1/15/19-1/21/19

## 2019-01-14 NOTE — CHART NOTE - NSCHARTNOTEFT_GEN_A_CORE
Blood sugar were elevated on previous feeds - Vital AF as high as 203 and also had diarrhea. Now shows good improvement with blood sugar on Glucerna feeds. Diarrhea has improved also    [id number]

## 2019-01-14 NOTE — CHART NOTE - NSCHARTNOTEFT_GEN_A_CORE
Patient seen with her daughter by her bedside. As per her daughter the patient has not had fever for more than 48 hours. She also indicated the patient's pruritus has improved. She indicated the patient may be DC tomorrow. As I already d/w her , current goals were toward taking all possible measures to prolong the patient's life. I already d/w him about available resources.     I will sign off at this time.     Please call me back if having issues with symptoms Rx or GOC discussions.     Alphonso Kilgore MD.   [phone]

## 2019-01-14 NOTE — PROGRESS NOTE ADULT - ASSESSMENT
Advanced dementia, right mrsa prosthetic hip infection, s/p leisa and spacer, on suppressive minocycline, recurrent aspiration events, chronic gavin completed tx for cre pseudomonas  s/p full course of Zerbaxa   History of UC, not active recently  Repeat ucx- yeast- colonizing vince, difficult to implicate as source of fever; even with such, non albicans candida would require course of IV tx  Persistent fevers even while on Zerbaxa  Known extensive DVT R LE- ?link to fever  Recent CT of C/A/P  with no defined infection   Microaspiration still possible  Sacral decubitus described as not infected- cx with typical colonizing vince  MCN stopped 1/8, ? drug fever  PLAN:  1.monitor off antibiotics  2.? drug fever to mcn  3.overall poor performance status is without change  4.No ID objection to discharge planning.

## 2019-01-14 NOTE — PROGRESS NOTE ADULT - SUBJECTIVE AND OBJECTIVE BOX
CC: f/u for fever    Patient reports: she is more lethargic today, receiving bolus feeds    REVIEW OF SYSTEMS:  All other review of systems negative (Comprehensive ROS): afebrile x 48 hours    Antimicrobials Day #  :Off mcn since 1/8    Other Medications Reviewed    T(F): 99.4 (01-14-19 @ 11:34), Max: 99.4 (01-14-19 @ 11:34)  HR: 91 (01-14-19 @ 11:34)  BP: 110/60 (01-14-19 @ 11:34)  RR: 18 (01-14-19 @ 11:34)  SpO2: 97% (01-14-19 @ 11:34)  Wt(kg): --    PHYSICAL EXAM:  General: lethargic, no acute distress  Eyes:  anicteric, no conjunctival injection, no discharge  Oropharynx: no lesions or injection 	  Neck: supple, without adenopathy  Lungs: clear to auscultation, poor inspiratory effert  Heart: regular rate and rhythm; no murmur, rubs or gallops  Abdomen: soft, nondistended, nontender, without mass or organomegaly  Skin: no lesions  Extremities: no clubbing, cyanosis, or edema  Neurologic: sleepy, poorly interactive    LAB RESULTS:                        9.9    15.81 )-----------( 383      ( 14 Jan 2019 08:13 )             30.6     01-13    136  |  98  |  30<H>  ----------------------------<  106<H>  4.3   |  25  |  0.54    Ca    10.2      13 Jan 2019 05:52          MICROBIOLOGY:  RECENT CULTURES:      RADIOLOGY REVIEWED:

## 2019-01-14 NOTE — PROGRESS NOTE ADULT - SUBJECTIVE AND OBJECTIVE BOX
---___---___---___---___---___---___ ---___---___---___---___---___---___---___---___---___---                    <<<  M E D I C A L   A T T E N D I N G    F O L L O W    U P   N O T E  >>>    afebrile for more than 24 hours.      ---___---___---___---___---___---      <<<  MEDICATIONS:  >>>    MEDICATIONS  (STANDING):  ALBUTerol/ipratropium for Nebulization 3 milliLiter(s) Nebulizer every 6 hours  apixaban 5 milliGRAM(s) Oral every 12 hours  AQUAPHOR (petrolatum Ointment) 1 Application(s) Topical three times a day  buDESOnide 160 MICROgram(s)/formoterol 4.5 MICROgram(s) Inhaler 2 Puff(s) Inhalation two times a day  camphor 0.5%/menthol 0.5% Topical Lotion 1 Application(s) Topical two times a day  clobetasol 0.05% Ointment 1 Application(s) Topical two times a day  clonazePAM Tablet 0.5 milliGRAM(s) Oral at bedtime  dextrose 5%. 1000 milliLiter(s) (50 mL/Hr) IV Continuous <Continuous>  dextrose 50% Injectable 12.5 Gram(s) IV Push once  dextrose 50% Injectable 25 Gram(s) IV Push once  dextrose 50% Injectable 25 Gram(s) IV Push once  ferrous    sulfate Liquid 300 milliGRAM(s) Enteral Tube daily  gabapentin   Solution 300 milliGRAM(s) Oral two times a day  insulin lispro (HumaLOG) corrective regimen sliding scale   SubCutaneous every 6 hours  lactobacillus acidophilus 1 Tablet(s) Oral daily  lisinopril 5 milliGRAM(s) Oral daily  mirtazapine 7.5 milliGRAM(s) Oral at bedtime  pantoprazole   Suspension 40 milliGRAM(s) Oral before breakfast  predniSONE  Solution 7.5 milliGRAM(s) Enteral Tube daily  QUEtiapine 25 milliGRAM(s) Oral at bedtime  sodium chloride 0.9%. 1000 milliLiter(s) (40 mL/Hr) IV Continuous <Continuous>  tiZANidine 4 milliGRAM(s) Oral <User Schedule>      MEDICATIONS  (PRN):  acetaminophen    Suspension .. 680 milliGRAM(s) Oral every 6 hours PRN Temp greater or equal to 38C (100.4F), Mild Pain (1 - 3)  dextrose 40% Gel 15 Gram(s) Oral once PRN Blood Glucose LESS THAN 70 milliGRAM(s)/deciliter  glucagon  Injectable 1 milliGRAM(s) IntraMuscular once PRN Glucose LESS THAN 70 milligrams/deciliter  ipratropium    for Nebulization 500 MICROGram(s) Nebulizer every 6 hours PRN Respiratory Distress  nystatin Powder 1 Application(s) Topical two times a day PRN rash/itchiness       ---___---___---___---___---___---     <<<REVIEW OF SYSTEM: >>>   unable to obtain      ---___---___---___---___---___---          <<<  VITAL SIGNS: >>>    T(F): 98 (01-14-19 @ 06:01), Max: 98.8 (01-13-19 @ 18:34)  HR: 83 (01-14-19 @ 06:01) (83 - 102)  BP: 97/60 (01-14-19 @ 06:01) (96/56 - 133/74)  RR: 18 (01-14-19 @ 06:01) (16 - 18)  SpO2: 98% (01-14-19 @ 06:01) (94% - 98%)  Wt(kg): --  CAPILLARY BLOOD GLUCOSE      POCT Blood Glucose.: 139 mg/dL (14 Jan 2019 05:45)    I&O's Summary    13 Jan 2019 07:01  -  14 Jan 2019 07:00  --------------------------------------------------------  IN: 820 mL / OUT: 925 mL / NET: -105 mL         ---___---___---___---___---___---                       PHYSICAL EXAM:    GEN: A&O X 0 , NAD , comfortable  HEENT: NCAT, PERRL, MMM, no scleral icterus, hearing intact  NECK: Supple, No JVD  CVS: S1S2 , regular , No M/R/G appreciated  PULM: CTA B/L,  no W/R/R appreciated  ABD.: soft. non tender, non distended,  bowel sounds present peg noted   Extrem: intact pulses , no edema noted  Derm: sacral decubitus noted         ---___---___---___---___---___---     <<<  LAB AND IMAGING: >>>                          10.0   14.20 )-----------( 341      ( 13 Jan 2019 08:34 )             31.2               01-13    136  |  98  |  30<H>  ----------------------------<  106<H>  4.3   |  25  |  0.54    Ca    10.2      13 Jan 2019 05:52                                 [All pertinent / recent available Imaging reports and other labs reviewed]     ---___---___---___---___---___---___ ---___---___---___---___---           <<<  A S S E S S M E N T   A N D   P L A N :  >>>          -GI/DVT Prophylaxis.    --------------------------------------------  Case discussed with   Education given on     >>______________________<<      Deniz Bolden .         phone   [phone]

## 2019-01-15 LAB
ANION GAP SERPL CALC-SCNC: 14 MMOL/L — SIGNIFICANT CHANGE UP (ref 5–17)
BUN SERPL-MCNC: 49 MG/DL — HIGH (ref 7–23)
CALCIUM SERPL-MCNC: 10.2 MG/DL — SIGNIFICANT CHANGE UP (ref 8.4–10.5)
CHLORIDE SERPL-SCNC: 98 MMOL/L — SIGNIFICANT CHANGE UP (ref 96–108)
CO2 SERPL-SCNC: 25 MMOL/L — SIGNIFICANT CHANGE UP (ref 22–31)
CREAT SERPL-MCNC: 0.55 MG/DL — SIGNIFICANT CHANGE UP (ref 0.5–1.3)
GLUCOSE BLDC GLUCOMTR-MCNC: 104 MG/DL — HIGH (ref 70–99)
GLUCOSE BLDC GLUCOMTR-MCNC: 116 MG/DL — HIGH (ref 70–99)
GLUCOSE BLDC GLUCOMTR-MCNC: 119 MG/DL — HIGH (ref 70–99)
GLUCOSE BLDC GLUCOMTR-MCNC: 125 MG/DL — HIGH (ref 70–99)
GLUCOSE BLDC GLUCOMTR-MCNC: 158 MG/DL — HIGH (ref 70–99)
GLUCOSE SERPL-MCNC: 110 MG/DL — HIGH (ref 70–99)
HCT VFR BLD CALC: 31.7 % — LOW (ref 34.5–45)
HGB BLD-MCNC: 10.1 G/DL — LOW (ref 11.5–15.5)
MCHC RBC-ENTMCNC: 30.1 PG — SIGNIFICANT CHANGE UP (ref 27–34)
MCHC RBC-ENTMCNC: 31.9 GM/DL — LOW (ref 32–36)
MCV RBC AUTO: 94.6 FL — SIGNIFICANT CHANGE UP (ref 80–100)
PLATELET # BLD AUTO: 389 K/UL — SIGNIFICANT CHANGE UP (ref 150–400)
POTASSIUM SERPL-MCNC: 4.1 MMOL/L — SIGNIFICANT CHANGE UP (ref 3.5–5.3)
POTASSIUM SERPL-SCNC: 4.1 MMOL/L — SIGNIFICANT CHANGE UP (ref 3.5–5.3)
RBC # BLD: 3.35 M/UL — LOW (ref 3.8–5.2)
RBC # FLD: 15.3 % — HIGH (ref 10.3–14.5)
SODIUM SERPL-SCNC: 137 MMOL/L — SIGNIFICANT CHANGE UP (ref 135–145)
WBC # BLD: 17.53 K/UL — HIGH (ref 3.8–10.5)
WBC # FLD AUTO: 17.53 K/UL — HIGH (ref 3.8–10.5)

## 2019-01-15 RX ORDER — APIXABAN 2.5 MG/1
1 TABLET, FILM COATED ORAL
Qty: 60 | Refills: 0 | OUTPATIENT
Start: 2019-01-15 | End: 2019-02-13

## 2019-01-15 RX ADMIN — BUDESONIDE AND FORMOTEROL FUMARATE DIHYDRATE 2 PUFF(S): 160; 4.5 AEROSOL RESPIRATORY (INHALATION) at 21:03

## 2019-01-15 RX ADMIN — TIZANIDINE 4 MILLIGRAM(S): 4 TABLET ORAL at 11:40

## 2019-01-15 RX ADMIN — TIZANIDINE 4 MILLIGRAM(S): 4 TABLET ORAL at 21:03

## 2019-01-15 RX ADMIN — Medication 1: at 12:29

## 2019-01-15 RX ADMIN — Medication 680 MILLIGRAM(S): at 21:06

## 2019-01-15 RX ADMIN — PANTOPRAZOLE SODIUM 40 MILLIGRAM(S): 20 TABLET, DELAYED RELEASE ORAL at 06:47

## 2019-01-15 RX ADMIN — APIXABAN 5 MILLIGRAM(S): 2.5 TABLET, FILM COATED ORAL at 17:51

## 2019-01-15 RX ADMIN — Medication 1 APPLICATION(S): at 06:48

## 2019-01-15 RX ADMIN — Medication 680 MILLIGRAM(S): at 06:47

## 2019-01-15 RX ADMIN — Medication 0.5 MILLIGRAM(S): at 21:04

## 2019-01-15 RX ADMIN — Medication 300 MILLIGRAM(S): at 12:35

## 2019-01-15 RX ADMIN — Medication 1 APPLICATION(S): at 14:35

## 2019-01-15 RX ADMIN — Medication 680 MILLIGRAM(S): at 22:32

## 2019-01-15 RX ADMIN — BUDESONIDE AND FORMOTEROL FUMARATE DIHYDRATE 2 PUFF(S): 160; 4.5 AEROSOL RESPIRATORY (INHALATION) at 11:40

## 2019-01-15 RX ADMIN — Medication 1 APPLICATION(S): at 17:51

## 2019-01-15 RX ADMIN — GABAPENTIN 300 MILLIGRAM(S): 400 CAPSULE ORAL at 17:56

## 2019-01-15 RX ADMIN — Medication 1 APPLICATION(S): at 21:03

## 2019-01-15 RX ADMIN — APIXABAN 5 MILLIGRAM(S): 2.5 TABLET, FILM COATED ORAL at 06:47

## 2019-01-15 RX ADMIN — GABAPENTIN 300 MILLIGRAM(S): 400 CAPSULE ORAL at 06:46

## 2019-01-15 RX ADMIN — LISINOPRIL 5 MILLIGRAM(S): 2.5 TABLET ORAL at 06:47

## 2019-01-15 RX ADMIN — Medication 3 MILLILITER(S): at 12:35

## 2019-01-15 RX ADMIN — Medication 1 TABLET(S): at 12:36

## 2019-01-15 RX ADMIN — MIRTAZAPINE 7.5 MILLIGRAM(S): 45 TABLET, ORALLY DISINTEGRATING ORAL at 21:04

## 2019-01-15 RX ADMIN — QUETIAPINE FUMARATE 25 MILLIGRAM(S): 200 TABLET, FILM COATED ORAL at 21:04

## 2019-01-15 RX ADMIN — Medication 7.5 MILLIGRAM(S): at 06:46

## 2019-01-15 RX ADMIN — MEN-PHOR 1 APPLICATION(S): .5; .5 LOTION TOPICAL at 17:51

## 2019-01-15 NOTE — PROGRESS NOTE ADULT - SUBJECTIVE AND OBJECTIVE BOX
INTERVAL HPI/OVERNIGHT EVENTS: Seen and examined with daughter and  in room . had fever.   Vital Signs Last 24 Hrs  T(C): 37.4 (15 Anuel 2019 16:05), Max: 38 (15 Anuel 2019 06:45)  T(F): 99.4 (15 Anuel 2019 16:05), Max: 100.4 (15 Anuel 2019 06:45)  HR: 94 (15 Anuel 2019 16:05) (86 - 95)  BP: 130/72 (15 Anuel 2019 16:05) (98/58 - 136/78)  BP(mean): --  RR: 18 (15 Anuel 2019 16:05) (18 - 18)  SpO2: 98% (15 Anuel 2019 16:05) (96% - 100%)  I&O's Summary    14 Jan 2019 07:01  -  15 Anuel 2019 07:00  --------------------------------------------------------  IN: 0 mL / OUT: 1200 mL / NET: -1200 mL    15 Anuel 2019 07:01  -  15 Anuel 2019 19:29  --------------------------------------------------------  IN: 720 mL / OUT: 250 mL / NET: 470 mL      MEDICATIONS  (STANDING):  ALBUTerol/ipratropium for Nebulization 3 milliLiter(s) Nebulizer every 6 hours  apixaban 5 milliGRAM(s) Oral every 12 hours  AQUAPHOR (petrolatum Ointment) 1 Application(s) Topical three times a day  buDESOnide 160 MICROgram(s)/formoterol 4.5 MICROgram(s) Inhaler 2 Puff(s) Inhalation two times a day  camphor 0.5%/menthol 0.5% Topical Lotion 1 Application(s) Topical two times a day  clobetasol 0.05% Ointment 1 Application(s) Topical two times a day  clonazePAM Tablet 0.5 milliGRAM(s) Oral at bedtime  dextrose 5%. 1000 milliLiter(s) (50 mL/Hr) IV Continuous <Continuous>  dextrose 50% Injectable 12.5 Gram(s) IV Push once  dextrose 50% Injectable 25 Gram(s) IV Push once  dextrose 50% Injectable 25 Gram(s) IV Push once  ferrous    sulfate Liquid 300 milliGRAM(s) Enteral Tube daily  gabapentin   Solution 300 milliGRAM(s) Oral two times a day  insulin lispro (HumaLOG) corrective regimen sliding scale   SubCutaneous every 6 hours  lactobacillus acidophilus 1 Tablet(s) Oral daily  lisinopril 5 milliGRAM(s) Oral daily  mirtazapine 7.5 milliGRAM(s) Oral at bedtime  pantoprazole   Suspension 40 milliGRAM(s) Oral before breakfast  predniSONE  Solution 7.5 milliGRAM(s) Enteral Tube daily  QUEtiapine 25 milliGRAM(s) Oral at bedtime  sodium chloride 0.9%. 1000 milliLiter(s) (40 mL/Hr) IV Continuous <Continuous>  tiZANidine 4 milliGRAM(s) Oral <User Schedule>    MEDICATIONS  (PRN):  acetaminophen    Suspension .. 680 milliGRAM(s) Oral every 6 hours PRN Temp greater or equal to 38C (100.4F), Mild Pain (1 - 3)  dextrose 40% Gel 15 Gram(s) Oral once PRN Blood Glucose LESS THAN 70 milliGRAM(s)/deciliter  glucagon  Injectable 1 milliGRAM(s) IntraMuscular once PRN Glucose LESS THAN 70 milligrams/deciliter  ipratropium    for Nebulization 500 MICROGram(s) Nebulizer every 6 hours PRN Respiratory Distress  nystatin Powder 1 Application(s) Topical two times a day PRN rash/itchiness    LABS:                        10.1   17.53 )-----------( 389      ( 15 Anuel 2019 08:18 )             31.7     01-15    137  |  98  |  49<H>  ----------------------------<  110<H>  4.1   |  25  |  0.55    Ca    10.2      15 Anuel 2019 05:52          CAPILLARY BLOOD GLUCOSE      POCT Blood Glucose.: 158 mg/dL (15 Anuel 2019 12:02)  POCT Blood Glucose.: 125 mg/dL (15 Anuel 2019 06:33)  POCT Blood Glucose.: 119 mg/dL (15 Anuel 2019 00:26)            Consultant(s) Notes Reviewed:  [x ] YES  [ ] NO    PHYSICAL EXAM:  GENERAL: NAD, Cachetic ,not in any distress ,  HEAD:  Atraumatic, Normocephalic  EYES: EOMI, PERRLA, conjunctiva and sclera clear  NECK: Supple, No JVD, Normal thyroid  NERVOUS SYSTEM:  Alert   CHEST/LUNG: Good air entry bilateral with no  rales, rhonchi, wheezing, or rubs  HEART: Regular rate and rhythm; No murmurs, rubs, or gallops  ABDOMEN: Soft, Nontender, Nondistended; Bowel sounds present, PEG+  EXTREMITIES:  No edema     Care Discussed with Consultants/Other Providers [ x] YES  [ ] NO

## 2019-01-15 NOTE — PROGRESS NOTE ADULT - PROBLEM SELECTOR PLAN 4
continue current meds  also chronic an using medical marijuana for pain
continue medical marijuana for her pain
needs pt for increasing rangeof motion
continue budesonide treatment
Supportive care and TF
continue current medication regimen
cardiology following
continue budesonide   continue glucose montioring
continue current meds
continue eloquis
continue medical marijuana
continue meds   as prescribed for now
continue meds as prescribed as well
continue pt
continue pt /ot
continue remeron and zoloft   klonopin for anxiety
contiue pt and ot
contrinue meds for psychiatric disorder  on remeron zoloft
needs home pt at home to increase range of motion   medical marijuana to help with the pain in the hips form the surgery
on insulin sliding scale
on zoloft and remeron  klonopin as well
pancultured  id called   cxr negative   will continue to follow
plavix held  continue anti hyperlipd medication
pt as needed
pt as needed to increase ROM
pt if possible to decrease contracture
pulm not read, changed to bolus feeds,  spiriva held
stable
taper zoloft   to dc   contiue remeron and seroqul and klonopin

## 2019-01-15 NOTE — PROGRESS NOTE ADULT - PROBLEM SELECTOR PROBLEM 4
Moderate episode of recurrent major depressive disorder
Ulcerative colitis
Functional quadriplegia
Asthma
DVT (deep venous thrombosis)
Advanced dementia
Asthma
Acute deep vein thrombosis (DVT) of other vein of lower extremity
Anxiety
Asthma
CVA (cerebral vascular accident)
Depression
Fever
Functional quadriplegia
IGT (impaired glucose tolerance)
Pulmonary HTN
Ulcerative colitis
Ulcerative colitis

## 2019-01-15 NOTE — PROGRESS NOTE ADULT - PROBLEM SELECTOR PROBLEM 2
Moderate episode of recurrent major depressive disorder
CHF exacerbation
DVT (deep venous thrombosis)
PNA (pneumonia)
Anxiety
CHF exacerbation
CVA (cerebral vascular accident)
CVA (cerebral vascular accident)
DVT (deep venous thrombosis)
Fever
Fever
Fever, unspecified fever cause
Moderate episode of recurrent major depressive disorder
R/O Congestive heart failure with left ventricular systolic dysfunction
Ulcerative colitis
Ulcerative colitis
IGT (impaired glucose tolerance)
DVT (deep venous thrombosis)
Asthma

## 2019-01-15 NOTE — PROVIDER CONTACT NOTE (OTHER) - NAME OF MD/NP/PA/DO NOTIFIED:
BENJI Patterson
BRET Mayberry
BENJI Geller
BENJI Perdomo
BRET Baez
BRET Baez
BRET Mayberry
BRET Mayberry
BRET Mccullough
BRET Mccullough
BRET Rogers
Bao Chau
Carol Mayberry PA-C
Carol Mayberry PA-C
Carol QUEEN
Chelsie Mayberry
Chelsie Mayberry
Deepti Regalado, NP
Eduard, NP
Francis Dixon, NP
Francis PA
Francis, NP
Francis, NP
Ginny High PA
Ionie Chambers, NP
Jacek Chambers
Joycelyn Zavala PA-C
Margaux Mohr NP
NP Aga
NP Le Palmer
Willow Bingham PA-C
Ynes Lynn

## 2019-01-15 NOTE — PROGRESS NOTE ADULT - PROBLEM SELECTOR PROBLEM 3
Functional quadriplegia
DVT (deep venous thrombosis)
Fever
DVT (deep venous thrombosis)
Functional quadriplegia
R/O Congestive heart failure with left ventricular systolic dysfunction
Anxiety
Asthma
Asthma
CHF exacerbation
CVA (cerebral vascular accident)
DVT (deep venous thrombosis)
Dysphagia, unspecified type
Moderate episode of recurrent major depressive disorder
Moderate episode of recurrent major depressive disorder
PNA (pneumonia)
Pulmonary HTN
Pulmonary HTN
R/O Congestive heart failure with left ventricular systolic dysfunction
Ulcerative colitis
CVA (cerebral vascular accident)
CHF exacerbation
IGT (impaired glucose tolerance)

## 2019-01-15 NOTE — PROGRESS NOTE ADULT - PROBLEM SELECTOR PROBLEM 1
R/O Congestive heart failure with left ventricular systolic dysfunction
Acute deep vein thrombosis (DVT) of other vein of lower extremity
CHF exacerbation
Fever
Fever, unspecified fever cause
Acute deep vein thrombosis (DVT) of other vein of lower extremity
Advanced dementia
CHF exacerbation
CHF exacerbation
Congestive heart failure with left ventricular systolic dysfunction
DVT (deep venous thrombosis)
Depression
Fever
Fever, unspecified fever cause
PNA (pneumonia)
PNA (pneumonia)
R/O Congestive heart failure with left ventricular systolic dysfunction
Congestive heart failure with left ventricular systolic dysfunction
Fever
Dysphagia, unspecified type

## 2019-01-15 NOTE — PROGRESS NOTE ADULT - PROBLEM SELECTOR PLAN 1
lasix via peg when ready   continue lisinopril no beta blockers since she has asthma   has clinically improved
ID following . Observing off Abxs.
curently on eloquis
work up still in progress
check tsh .if thyroid overactive may be cause of fevers, unlikely since wbc also rising
continue abx   need another cxr   possible cultiures again
continue antibiotics
continue current  meds cardiology following
continue current care and follow labs
continue current meds
continue current meds
continue current meds   cardiology following
continue current meds  unlikely rash came form medications   montior clinically
continue eloquis
continue eloquis  cardiology followin   if fever free fro 24 hours can dc home   heat pack and tylenol for the pain no nsaids since on antiocoagulation
continue eloquis  cardiology following
continue enalapril   cardilogy following
continue to monitor   gong for ct abdomen pelvis  alergic to dye non contrast to be done
has improved on current meds  if tolerated lasix via peg   can dc home in am   lasix po qod 20 mg   lisinopril
izabella dominique   np spoke to dr lujan
now since resolved
rheumaotlogy consult noted   will get pulmonary involved
spoke to dr lujan   will change to po lasix every other day   if tolerating po will start dc planning   continue lisinopril   lipitor   and plavix
still elevated   hematology ordered  and
still elevated   spoke at length with   amenable to palliative care
still with elevate fever  need to be fever free prior to dc
suspect dvt   some meds discontinued
cardiology input appreciated   spoke to dr lujan   agree with plan
pancultured an still negative but getting fevers  will get ct abdomen pelvis to rule out any other etiology   need iv fluid
continue peg feeds

## 2019-01-15 NOTE — PROGRESS NOTE ADULT - SUBJECTIVE AND OBJECTIVE BOX
CC: f/u for fever    Patient reports: no complaints.She is more alert and awake, the most interactive I have seen her    REVIEW OF SYSTEMS:  All other review of systems negative (Comprehensive ROS)    Antimicrobials Day #  off:    Other Medications Reviewed    T(F): 99.4 (01-15-19 @ 12:01), Max: 100.4 (01-15-19 @ 06:45)  HR: 92 (01-15-19 @ 08:18)  BP: 122/72 (01-15-19 @ 12:01)  RR: 18 (01-15-19 @ 12:01)  SpO2: 98% (01-15-19 @ 12:01)  Wt(kg): --    PHYSICAL EXAM:  General: alert, no acute distress  Eyes:  anicteric, no conjunctival injection, no discharge  Oropharynx: no lesions or injection 	  Neck: supple, without adenopathy  Lungs: clear to auscultation  Heart: regular rate and rhythm; no murmur, rubs or gallops  Abdomen: soft, nondistended, nontender, without mass or organomegaly  Skin: sacral decubitus  Extremities: no clubbing, cyanosis, or edema  Neurologic: awake, baseline dementia,contracted   gavin  LAB RESULTS:                        10.1   17.53 )-----------( 389      ( 15 Anuel 2019 08:18 )             31.7     01-15    137  |  98  |  49<H>  ----------------------------<  110<H>  4.1   |  25  |  0.55    Ca    10.2      15 Anuel 2019 05:52          MICROBIOLOGY:  RECENT CULTURES:      RADIOLOGY REVIEWED:

## 2019-01-15 NOTE — PROVIDER CONTACT NOTE (OTHER) - DATE AND TIME:
24-Dec-2018 07:00
31-Dec-2018 06:45
02-Jan-2019 22:00
03-Dec-2018 22:45
03-Jan-2019 03:55
03-Jan-2019 23:05
03-Nov-2018 23:30
04-Dec-2018 04:19
04-Jan-2019 01:25
07-Jan-2019 01:05
09-Dec-2018 05:00
09-Dec-2018 23:05
09-Jan-2019 03:45
09-Jan-2019 21:05
10-Anuel-2019 05:30
11-Dec-2018 00:45
11-Jan-2019 03:00
14-Dec-2018 18:10
14-Jan-2019 06:21
15-Anuel-2019 00:25
15-Dec-2018 06:00
18-Dec-2018 10:00
19-Dec-2018 20:30
19-Dec-2018 21:00
20-Dec-2018 01:00
20-Dec-2018 04:15
20-Dec-2018 22:25
21-Dec-2018 01:00
23-Dec-2018 06:45
24-Dec-2018 20:15
26-Dec-2018 02:00
27-Dec-2018 04:40
28-Dec-2018 03:15
28-Dec-2018 03:15
28-Dec-2018 21:30
29-Dec-2018 21:40

## 2019-01-15 NOTE — PROGRESS NOTE ADULT - PROBLEM SELECTOR PLAN 3
restart lisinopril when bp is elevated
continue to monitor . since on steroids
need pt at home
Supportive care
contine Eliquis
continue current meds   monitr electrolytes
cardiology  following
continue El;iquis   monitor clinically
continue Eliquis
continue abx  follow cbc
continue current meds
continue current meds
continue currentmeds
continue eiliquis
continue eliquis
continue klonopin.  zoloft and remeron
continue lisinopril
continue lisinopril
continue plavix an lipitor
contiue budesonide  insulin fro igt
contiue current mes
has been stable  cardiology following
has been stable continue meds
has been stable on lisinopril   bp stable
on peg tube
slow taper of zoloft otherwise continue meds
stable on budesonide
continue antihyperlipidemics meds  and plavix

## 2019-01-15 NOTE — PROVIDER CONTACT NOTE (OTHER) - RECOMMENDATIONS
Tylenol PRN is not due.Blood c/s and urine c/s sent last night
Tylenol suspension
Will clean area and ensure patency
Administer tylenol via PEG and recheck temp in 1 hour as per NP request.
Continue to monitor patient as repeat blood cultures have been sent as per PA. Recheck rectal temp in 2 hours as per PA request.
Continue to monitor.
NP made aware, [employer] will continue to observe
Notify BENJI Perdomo, cooling blanket , IV tylenol
Notify NP
Notify NP
Notify NP.
Notify NP.
Notify PA
Notify PA
Notify PA.
PA made aware.
RRT
Reschedule BP meds
Tylenol 680 mg via PEG.
Tylenol.
Will continue to monitor.
hold Vanco, administer Benadryl.
labs? continue to monitor
notify NP< give ordered tylenol, continue to monitor
notify PA, continue to monitor, administer tylenol suspension, provide cold packs
notify provider
notify provider

## 2019-01-15 NOTE — PROVIDER CONTACT NOTE (OTHER) - ACTION/TREATMENT ORDERED:
Blood cultures ordered
PA made aware, will advise day shift nurse to re-check temp in 1 hour and continue to monitor.
Tylenol 680mg oral suspension given,will continue to monitor
Cooling blanket. Awaiting additional orders.
Cooling measures taken. Tylenol administered. Will reassess until euthermic.
Cooling measures taken. Tylenol given. Bld cx, UA, and UCX sent. Will continue to monitor.
Cooling measures taken. Tylenol solution given. Lisinopril restarted.
NP informed, Tylenol PRN PO through PEG ordered as stat. Will continue to monitor
Np aware of situation. Will leave Blackman in place and continue to monitor pt.
Np made aware, Tylenol given. Will continue to monitor pt
Provider made aware. EKG completed, in chart. No interventions at this time. Continue to monitor pt.
As per NP, administer 250 bolus. Recheck blood pressure after bolus is complete.
As per NP, administer Tylenol through PEG Tube. Draw blood cultures and urinalaysis. Will continue to monitor.
As per NP, call RRT, see RRT note.
As per PA, 1 time dose of 650 mg IV Tylenol ordered. Recheck temp. Continue to monitor patient
As per PA, administer Tylenol through PEG Tube. Blood and urine sent. Will continue to monitor.
As per PA, at bedside to assess patient, labs orders, fluid bolus given for BP support  Will cont to monitor.
As per PA, pt already received Tylenol. Provide pt with cold packs and recheck temp in an hour.
As per PA, will attempt to talk to family. Okay to change gavin in the morning, endorse to day team. UA drawn from current gavin. Will continue to monitor.
As per provider, OK to administer Tylenol suspension as ordered  Will give mediation and cont to monitor closely
Mag and K added to morning labs. Will continue to monitor
NP aware, give tylenoll, continue to monitor
NP aware. Will continue to monitor.
NP made aware, [employer] will continue to observe
NP made aware, bp med rescheduled for a later time. Will continue to monitor and re-check bp.
NP orders Sodium Chloride 0.9% at 40cc/hr for 4 hours. Will continue to monitor.
NP stated to give PRN acetaminophen. NP ordered blood cultures to be drawn. Will reassess rectal temp in an hour and continue to monitor.
PA aware, continue to monitor, OK to give tylenol suspension and cold packs, will add orders as needed
PA aware. Will continue to monitor. PA said to give PRN Tylenol suspension. PA ordered blood cultures to be drawn as well.
PA made aware, will continue to monitor pt.
PA recommends to monitor patient.
Recheck temp 99.8 F. Patient stable. Continue to monitor as per NP request.
Recheck temperature when the Tylenol is due next at 0500. Will continue to monitor.
Will check rectal temperature at 0600.
Will notify day RN. Patient stable.
cooling blanket placed, will administer iv tylenol per NP Leanne order
give Benadryl Elixir via PEG tube and restart Vanco. will continue to monitor

## 2019-01-15 NOTE — PROGRESS NOTE ADULT - ASSESSMENT
Advanced dementia, right mrsa prosthetic hip infection, s/p leisa and spacer, on suppressive minocycline, recurrent aspiration events, chronic gavin completed tx for cre pseudomonas  s/p full course of Zerbaxa   History of UC, not active recently  Repeat ucx- yeast- colonizing vince, difficult to implicate as source of fever; even with such, non albicans candida would require course of IV tx  Persistent fevers even while on Zerbaxa  Known extensive DVT R LE- ?link to fever  Recent CT of C/A/P  with no defined infection   Microaspiration still possible  Sacral decubitus described as not infected- cx with typical colonizing vince  MCN stopped 1/8, ? drug fever although now that she has spiked this seems les likely.  Her leukocytosis has waxed and waned  PLAN:  1.monitor off antibiotics  2.? drug fever to mcn  3.overall poor performance status is without change, she is clinically stable, will try to only advise additional ID w/u if condition changes  4.No ID objection to discharge planning.

## 2019-01-15 NOTE — PROGRESS NOTE ADULT - PROBLEM SELECTOR PLAN 2
continue anxiolytics and antidepressants
On Eliquis
continue enalapril
finish abx course
cardilogy following   has  been stable from cardiac standpoint   continue meds
continue Eliquis   clot is slowly resolving
continue current  meds
continue current medication
continue current meds
continue current meds
continue current regimen of medication
continue current regiment is alsotaking medical marijuana for pain releif as well
continue eliquis
continue eloquis   monitor clinically
continue enalapril
continue lisinopril
continue lisinopril if bp is elevated
continue meds
continue meds lisinopril for blood pressure   follow up with cardiology
contiue current meds as well
elevated
has not pinpointed the source. may be drug related , but the regimen she is on is not new
hold plavix   while on eloquiss
id pulmonary , rheumatology following   cause not yet found   possibly from dvt or drug induced
on plavix   has early onset dementia   also taking psychiatric medication
stable continue meds
workup is on going  on prednisone 30 from rheumatology   leukocytosis from prednisone
continue monitoring glucose
continue eloquis   cardiology following
continue budenoside

## 2019-01-16 VITALS
RESPIRATION RATE: 18 BRPM | TEMPERATURE: 98 F | OXYGEN SATURATION: 97 % | DIASTOLIC BLOOD PRESSURE: 70 MMHG | HEART RATE: 80 BPM | SYSTOLIC BLOOD PRESSURE: 115 MMHG

## 2019-01-16 LAB
ANION GAP SERPL CALC-SCNC: 13 MMOL/L — SIGNIFICANT CHANGE UP (ref 5–17)
BASOPHILS # BLD AUTO: 0.03 K/UL — SIGNIFICANT CHANGE UP (ref 0–0.2)
BASOPHILS NFR BLD AUTO: 0.2 % — SIGNIFICANT CHANGE UP (ref 0–2)
BUN SERPL-MCNC: 48 MG/DL — HIGH (ref 7–23)
CALCIUM SERPL-MCNC: 10.1 MG/DL — SIGNIFICANT CHANGE UP (ref 8.4–10.5)
CHLORIDE SERPL-SCNC: 100 MMOL/L — SIGNIFICANT CHANGE UP (ref 96–108)
CO2 SERPL-SCNC: 27 MMOL/L — SIGNIFICANT CHANGE UP (ref 22–31)
CREAT SERPL-MCNC: 0.67 MG/DL — SIGNIFICANT CHANGE UP (ref 0.5–1.3)
EOSINOPHIL # BLD AUTO: 0.72 K/UL — HIGH (ref 0–0.5)
EOSINOPHIL NFR BLD AUTO: 4.9 % — SIGNIFICANT CHANGE UP (ref 0–6)
GLUCOSE BLDC GLUCOMTR-MCNC: 124 MG/DL — HIGH (ref 70–99)
GLUCOSE BLDC GLUCOMTR-MCNC: 159 MG/DL — HIGH (ref 70–99)
GLUCOSE SERPL-MCNC: 111 MG/DL — HIGH (ref 70–99)
HCT VFR BLD CALC: 31.5 % — LOW (ref 34.5–45)
HGB BLD-MCNC: 10.2 G/DL — LOW (ref 11.5–15.5)
IMM GRANULOCYTES NFR BLD AUTO: 0.3 % — SIGNIFICANT CHANGE UP (ref 0–1.5)
LYMPHOCYTES # BLD AUTO: 1.36 K/UL — SIGNIFICANT CHANGE UP (ref 1–3.3)
LYMPHOCYTES # BLD AUTO: 9.3 % — LOW (ref 13–44)
MCHC RBC-ENTMCNC: 30.9 PG — SIGNIFICANT CHANGE UP (ref 27–34)
MCHC RBC-ENTMCNC: 32.4 GM/DL — SIGNIFICANT CHANGE UP (ref 32–36)
MCV RBC AUTO: 95.5 FL — SIGNIFICANT CHANGE UP (ref 80–100)
MONOCYTES # BLD AUTO: 1.1 K/UL — HIGH (ref 0–0.9)
MONOCYTES NFR BLD AUTO: 7.5 % — SIGNIFICANT CHANGE UP (ref 2–14)
NEUTROPHILS # BLD AUTO: 11.39 K/UL — HIGH (ref 1.8–7.4)
NEUTROPHILS NFR BLD AUTO: 77.8 % — HIGH (ref 43–77)
PLATELET # BLD AUTO: 367 K/UL — SIGNIFICANT CHANGE UP (ref 150–400)
POTASSIUM SERPL-MCNC: 4.5 MMOL/L — SIGNIFICANT CHANGE UP (ref 3.5–5.3)
POTASSIUM SERPL-SCNC: 4.5 MMOL/L — SIGNIFICANT CHANGE UP (ref 3.5–5.3)
RBC # BLD: 3.3 M/UL — LOW (ref 3.8–5.2)
RBC # FLD: 15.2 % — HIGH (ref 10.3–14.5)
SODIUM SERPL-SCNC: 140 MMOL/L — SIGNIFICANT CHANGE UP (ref 135–145)
WBC # BLD: 14.64 K/UL — HIGH (ref 3.8–10.5)
WBC # FLD AUTO: 14.64 K/UL — HIGH (ref 3.8–10.5)

## 2019-01-16 PROCEDURE — 86901 BLOOD TYPING SEROLOGIC RH(D): CPT

## 2019-01-16 PROCEDURE — 87103 BLOOD FUNGUS CULTURE: CPT

## 2019-01-16 PROCEDURE — 86225 DNA ANTIBODY NATIVE: CPT

## 2019-01-16 PROCEDURE — A9567: CPT

## 2019-01-16 PROCEDURE — 84132 ASSAY OF SERUM POTASSIUM: CPT

## 2019-01-16 PROCEDURE — 84145 PROCALCITONIN (PCT): CPT

## 2019-01-16 PROCEDURE — 85652 RBC SED RATE AUTOMATED: CPT

## 2019-01-16 PROCEDURE — 82947 ASSAY GLUCOSE BLOOD QUANT: CPT

## 2019-01-16 PROCEDURE — 86147 CARDIOLIPIN ANTIBODY EA IG: CPT

## 2019-01-16 PROCEDURE — 86038 ANTINUCLEAR ANTIBODIES: CPT

## 2019-01-16 PROCEDURE — 97535 SELF CARE MNGMENT TRAINING: CPT

## 2019-01-16 PROCEDURE — 93306 TTE W/DOPPLER COMPLETE: CPT

## 2019-01-16 PROCEDURE — 87186 SC STD MICRODIL/AGAR DIL: CPT

## 2019-01-16 PROCEDURE — 87486 CHLMYD PNEUM DNA AMP PROBE: CPT

## 2019-01-16 PROCEDURE — 86255 FLUORESCENT ANTIBODY SCREEN: CPT

## 2019-01-16 PROCEDURE — 87324 CLOSTRIDIUM AG IA: CPT

## 2019-01-16 PROCEDURE — 97166 OT EVAL MOD COMPLEX 45 MIN: CPT

## 2019-01-16 PROCEDURE — 84484 ASSAY OF TROPONIN QUANT: CPT

## 2019-01-16 PROCEDURE — 80048 BASIC METABOLIC PNL TOTAL CA: CPT

## 2019-01-16 PROCEDURE — 85379 FIBRIN DEGRADATION QUANT: CPT

## 2019-01-16 PROCEDURE — 97110 THERAPEUTIC EXERCISES: CPT

## 2019-01-16 PROCEDURE — 93971 EXTREMITY STUDY: CPT

## 2019-01-16 PROCEDURE — 82803 BLOOD GASES ANY COMBINATION: CPT

## 2019-01-16 PROCEDURE — A9540: CPT

## 2019-01-16 PROCEDURE — 87070 CULTURE OTHR SPECIMN AEROBIC: CPT

## 2019-01-16 PROCEDURE — 87046 STOOL CULTR AEROBIC BACT EA: CPT

## 2019-01-16 PROCEDURE — 86900 BLOOD TYPING SEROLOGIC ABO: CPT

## 2019-01-16 PROCEDURE — 84443 ASSAY THYROID STIM HORMONE: CPT

## 2019-01-16 PROCEDURE — 87150 DNA/RNA AMPLIFIED PROBE: CPT

## 2019-01-16 PROCEDURE — 80053 COMPREHEN METABOLIC PANEL: CPT

## 2019-01-16 PROCEDURE — 81001 URINALYSIS AUTO W/SCOPE: CPT

## 2019-01-16 PROCEDURE — 84100 ASSAY OF PHOSPHORUS: CPT

## 2019-01-16 PROCEDURE — 86235 NUCLEAR ANTIGEN ANTIBODY: CPT

## 2019-01-16 PROCEDURE — 73552 X-RAY EXAM OF FEMUR 2/>: CPT

## 2019-01-16 PROCEDURE — 93970 EXTREMITY STUDY: CPT

## 2019-01-16 PROCEDURE — 87086 URINE CULTURE/COLONY COUNT: CPT

## 2019-01-16 PROCEDURE — 86146 BETA-2 GLYCOPROTEIN ANTIBODY: CPT

## 2019-01-16 PROCEDURE — 84295 ASSAY OF SERUM SODIUM: CPT

## 2019-01-16 PROCEDURE — 83735 ASSAY OF MAGNESIUM: CPT

## 2019-01-16 PROCEDURE — 86850 RBC ANTIBODY SCREEN: CPT

## 2019-01-16 PROCEDURE — 87045 FECES CULTURE AEROBIC BACT: CPT

## 2019-01-16 PROCEDURE — 83516 IMMUNOASSAY NONANTIBODY: CPT

## 2019-01-16 PROCEDURE — 85613 RUSSELL VIPER VENOM DILUTED: CPT

## 2019-01-16 PROCEDURE — 71045 X-RAY EXAM CHEST 1 VIEW: CPT

## 2019-01-16 PROCEDURE — 85027 COMPLETE CBC AUTOMATED: CPT

## 2019-01-16 PROCEDURE — 87798 DETECT AGENT NOS DNA AMP: CPT

## 2019-01-16 PROCEDURE — 82435 ASSAY OF BLOOD CHLORIDE: CPT

## 2019-01-16 PROCEDURE — 86036 ANCA SCREEN EACH ANTIBODY: CPT

## 2019-01-16 PROCEDURE — 73562 X-RAY EXAM OF KNEE 3: CPT

## 2019-01-16 PROCEDURE — 73502 X-RAY EXAM HIP UNI 2-3 VIEWS: CPT

## 2019-01-16 PROCEDURE — 93005 ELECTROCARDIOGRAM TRACING: CPT

## 2019-01-16 PROCEDURE — 87449 NOS EACH ORGANISM AG IA: CPT

## 2019-01-16 PROCEDURE — 83605 ASSAY OF LACTIC ACID: CPT

## 2019-01-16 PROCEDURE — 74176 CT ABD & PELVIS W/O CONTRAST: CPT

## 2019-01-16 PROCEDURE — 71250 CT THORAX DX C-: CPT

## 2019-01-16 PROCEDURE — 87184 SC STD DISK METHOD PER PLATE: CPT

## 2019-01-16 PROCEDURE — 87040 BLOOD CULTURE FOR BACTERIA: CPT

## 2019-01-16 PROCEDURE — 78582 LUNG VENTILAT&PERFUS IMAGING: CPT

## 2019-01-16 PROCEDURE — 87581 M.PNEUMON DNA AMP PROBE: CPT

## 2019-01-16 PROCEDURE — 85610 PROTHROMBIN TIME: CPT

## 2019-01-16 PROCEDURE — 85730 THROMBOPLASTIN TIME PARTIAL: CPT

## 2019-01-16 PROCEDURE — 97161 PT EVAL LOW COMPLEX 20 MIN: CPT

## 2019-01-16 PROCEDURE — 83880 ASSAY OF NATRIURETIC PEPTIDE: CPT

## 2019-01-16 PROCEDURE — 36415 COLL VENOUS BLD VENIPUNCTURE: CPT

## 2019-01-16 PROCEDURE — 87633 RESP VIRUS 12-25 TARGETS: CPT

## 2019-01-16 PROCEDURE — 97530 THERAPEUTIC ACTIVITIES: CPT

## 2019-01-16 PROCEDURE — 99285 EMERGENCY DEPT VISIT HI MDM: CPT

## 2019-01-16 PROCEDURE — 82330 ASSAY OF CALCIUM: CPT

## 2019-01-16 PROCEDURE — 82962 GLUCOSE BLOOD TEST: CPT

## 2019-01-16 PROCEDURE — 94640 AIRWAY INHALATION TREATMENT: CPT

## 2019-01-16 PROCEDURE — 85014 HEMATOCRIT: CPT

## 2019-01-16 RX ORDER — IPRATROPIUM/ALBUTEROL SULFATE 18-103MCG
3 AEROSOL WITH ADAPTER (GRAM) INHALATION
Qty: 0 | Refills: 0 | COMMUNITY
Start: 2019-01-16

## 2019-01-16 RX ORDER — MINOCYCLINE HYDROCHLORIDE 45 MG/1
1 TABLET, EXTENDED RELEASE ORAL
Qty: 0 | Refills: 0 | COMMUNITY

## 2019-01-16 RX ORDER — APIXABAN 2.5 MG/1
1 TABLET, FILM COATED ORAL
Qty: 60 | Refills: 0 | OUTPATIENT
Start: 2019-01-16 | End: 2019-02-14

## 2019-01-16 RX ORDER — PETROLATUM,WHITE
1 JELLY (GRAM) TOPICAL
Qty: 0 | Refills: 0 | COMMUNITY
Start: 2019-01-16

## 2019-01-16 RX ORDER — QUETIAPINE FUMARATE 200 MG/1
1 TABLET, FILM COATED ORAL
Qty: 0 | Refills: 0 | COMMUNITY
Start: 2019-01-16

## 2019-01-16 RX ORDER — LISINOPRIL 2.5 MG/1
1 TABLET ORAL
Qty: 0 | Refills: 0 | COMMUNITY
Start: 2019-01-16

## 2019-01-16 RX ORDER — CLONAZEPAM 1 MG
1 TABLET ORAL
Qty: 0 | Refills: 0 | COMMUNITY
Start: 2019-01-16

## 2019-01-16 RX ORDER — IPRATROPIUM BROMIDE 0.2 MG/ML
2.5 SOLUTION, NON-ORAL INHALATION
Qty: 0 | Refills: 0 | COMMUNITY
Start: 2019-01-16

## 2019-01-16 RX ORDER — GABAPENTIN 400 MG/1
0.5 CAPSULE ORAL
Qty: 0 | Refills: 0 | DISCHARGE
Start: 2019-01-16

## 2019-01-16 RX ORDER — GABAPENTIN 400 MG/1
0.5 CAPSULE ORAL
Qty: 0 | Refills: 0 | COMMUNITY

## 2019-01-16 RX ORDER — LISINOPRIL 2.5 MG/1
1 TABLET ORAL
Qty: 30 | Refills: 0 | OUTPATIENT
Start: 2019-01-16 | End: 2019-02-14

## 2019-01-16 RX ORDER — POTASSIUM CHLORIDE 20 MEQ
1 PACKET (EA) ORAL
Qty: 0 | Refills: 0 | COMMUNITY

## 2019-01-16 RX ORDER — IPRATROPIUM/ALBUTEROL SULFATE 18-103MCG
3 AEROSOL WITH ADAPTER (GRAM) INHALATION
Qty: 0 | Refills: 0 | COMMUNITY

## 2019-01-16 RX ORDER — GABAPENTIN 400 MG/1
6 CAPSULE ORAL
Qty: 0 | Refills: 0 | DISCHARGE
Start: 2019-01-16

## 2019-01-16 RX ORDER — LISINOPRIL 2.5 MG/1
1 TABLET ORAL
Qty: 30 | Refills: 0 | OUTPATIENT
Start: 2019-01-16

## 2019-01-16 RX ORDER — BUDESONIDE AND FORMOTEROL FUMARATE DIHYDRATE 160; 4.5 UG/1; UG/1
2 AEROSOL RESPIRATORY (INHALATION)
Qty: 0 | Refills: 0 | COMMUNITY
Start: 2019-01-16

## 2019-01-16 RX ORDER — MIRTAZAPINE 45 MG/1
1 TABLET, ORALLY DISINTEGRATING ORAL
Qty: 0 | Refills: 0 | COMMUNITY
Start: 2019-01-16

## 2019-01-16 RX ADMIN — APIXABAN 5 MILLIGRAM(S): 2.5 TABLET, FILM COATED ORAL at 06:35

## 2019-01-16 RX ADMIN — GABAPENTIN 300 MILLIGRAM(S): 400 CAPSULE ORAL at 06:36

## 2019-01-16 RX ADMIN — Medication 1 TABLET(S): at 12:20

## 2019-01-16 RX ADMIN — Medication 7.5 MILLIGRAM(S): at 06:35

## 2019-01-16 RX ADMIN — LISINOPRIL 5 MILLIGRAM(S): 2.5 TABLET ORAL at 06:35

## 2019-01-16 RX ADMIN — Medication 300 MILLIGRAM(S): at 12:20

## 2019-01-16 RX ADMIN — Medication 3 MILLILITER(S): at 12:21

## 2019-01-16 RX ADMIN — Medication 1 APPLICATION(S): at 06:59

## 2019-01-16 RX ADMIN — Medication 1 APPLICATION(S): at 12:20

## 2019-01-16 RX ADMIN — PANTOPRAZOLE SODIUM 40 MILLIGRAM(S): 20 TABLET, DELAYED RELEASE ORAL at 07:00

## 2019-01-16 RX ADMIN — BUDESONIDE AND FORMOTEROL FUMARATE DIHYDRATE 2 PUFF(S): 160; 4.5 AEROSOL RESPIRATORY (INHALATION) at 12:21

## 2019-01-16 RX ADMIN — TIZANIDINE 4 MILLIGRAM(S): 4 TABLET ORAL at 11:58

## 2019-01-16 RX ADMIN — Medication 1: at 12:19

## 2019-01-16 NOTE — PROCEDURE NOTE - NSURITECHNIQUE_GU_A_CORE
Proper hand hygiene was performed/A sterile drape was used to cover all adjacent areas/The urinary drainage system is closed at the end of the procedure/All applicable medical record documentation is completed/The catheter was appropriately lubricated

## 2019-01-16 NOTE — PROGRESS NOTE ADULT - ASSESSMENT
Problem/Plan - 1:  ·  Problem: Fever.  Plan: ID following . Observing off Abxs.  Leucocytosis trending down and no fever so cleared by ID for DC.      Problem/Plan - 2:  ·  Problem: DVT (deep venous thrombosis).  Plan: On Eliquis.      Problem/Plan - 3:  ·  Problem: Functional quadriplegia.  Plan: Supportive care.      Problem/Plan - 4:  ·  Problem: Advanced dementia.  Plan: Supportive care and TF.

## 2019-01-16 NOTE — PROGRESS NOTE ADULT - PROVIDER SPECIALTY LIST ADULT
Cardiology
Family Medicine
Infectious Disease
Internal Medicine
Internal Medicine
Pulmonology
Rheumatology
Urology
Infectious Disease
Cardiology
Family Medicine

## 2019-01-16 NOTE — PROGRESS NOTE ADULT - SUBJECTIVE AND OBJECTIVE BOX
INTERVAL HPI/OVERNIGHT EVENTS: No new concerns. feeling good and smiling.   Vital Signs Last 24 Hrs  T(C): 36.7 (16 Jan 2019 08:50), Max: 37.4 (15 Anuel 2019 12:01)  T(F): 98.1 (16 Jan 2019 08:50), Max: 99.4 (15 Anuel 2019 12:01)  HR: 80 (16 Jan 2019 08:50) (80 - 97)  BP: 115/70 (16 Jan 2019 08:50) (115/70 - 156/79)  BP(mean): --  RR: 18 (16 Jan 2019 08:50) (18 - 18)  SpO2: 97% (16 Jan 2019 08:50) (95% - 98%)  I&O's Summary    15 Anuel 2019 07:01  -  16 Jan 2019 07:00  --------------------------------------------------------  IN: 720 mL / OUT: 600 mL / NET: 120 mL      MEDICATIONS  (STANDING):  ALBUTerol/ipratropium for Nebulization 3 milliLiter(s) Nebulizer every 6 hours  apixaban 5 milliGRAM(s) Oral every 12 hours  AQUAPHOR (petrolatum Ointment) 1 Application(s) Topical three times a day  buDESOnide 160 MICROgram(s)/formoterol 4.5 MICROgram(s) Inhaler 2 Puff(s) Inhalation two times a day  camphor 0.5%/menthol 0.5% Topical Lotion 1 Application(s) Topical two times a day  clobetasol 0.05% Ointment 1 Application(s) Topical two times a day  clonazePAM Tablet 0.5 milliGRAM(s) Oral at bedtime  dextrose 5%. 1000 milliLiter(s) (50 mL/Hr) IV Continuous <Continuous>  dextrose 50% Injectable 12.5 Gram(s) IV Push once  dextrose 50% Injectable 25 Gram(s) IV Push once  dextrose 50% Injectable 25 Gram(s) IV Push once  ferrous    sulfate Liquid 300 milliGRAM(s) Enteral Tube daily  gabapentin   Solution 300 milliGRAM(s) Oral two times a day  insulin lispro (HumaLOG) corrective regimen sliding scale   SubCutaneous every 6 hours  lactobacillus acidophilus 1 Tablet(s) Oral daily  lisinopril 5 milliGRAM(s) Oral daily  mirtazapine 7.5 milliGRAM(s) Oral at bedtime  pantoprazole   Suspension 40 milliGRAM(s) Oral before breakfast  predniSONE  Solution 7.5 milliGRAM(s) Enteral Tube daily  QUEtiapine 25 milliGRAM(s) Oral at bedtime  sodium chloride 0.9%. 1000 milliLiter(s) (40 mL/Hr) IV Continuous <Continuous>  tiZANidine 4 milliGRAM(s) Oral <User Schedule>    MEDICATIONS  (PRN):  acetaminophen    Suspension .. 680 milliGRAM(s) Oral every 6 hours PRN Temp greater or equal to 38C (100.4F), Mild Pain (1 - 3)  dextrose 40% Gel 15 Gram(s) Oral once PRN Blood Glucose LESS THAN 70 milliGRAM(s)/deciliter  glucagon  Injectable 1 milliGRAM(s) IntraMuscular once PRN Glucose LESS THAN 70 milligrams/deciliter  ipratropium    for Nebulization 500 MICROGram(s) Nebulizer every 6 hours PRN Respiratory Distress  nystatin Powder 1 Application(s) Topical two times a day PRN rash/itchiness    LABS:                        10.2   14.64 )-----------( 367      ( 16 Jan 2019 08:24 )             31.5     01-16    140  |  100  |  48<H>  ----------------------------<  111<H>  4.5   |  27  |  0.67    Ca    10.1      16 Jan 2019 05:37              Consultant(s) Notes Reviewed:  [x ] YES  [ ] NO    PHYSICAL EXAM:  GENERAL: NAD, not in any distress ,  HEAD:  Atraumatic, Normocephalic  EYES: EOMI, PERRLA, conjunctiva and sclera clear  NECK: Supple, No JVD, Normal thyroid  NERVOUS SYSTEM:  Alert   CHEST/LUNG: Good air entry bilateral with no  rales, rhonchi, wheezing, or rubs  HEART: Regular rate and rhythm; No murmurs, rubs, or gallops  ABDOMEN: Soft, Nontender, Nondistended; Bowel sounds present, PEG +  EXTREMITIES:  decubitus +    Care Discussed with Consultants/Other Providers [ x] YES  [ ] NO

## 2019-01-16 NOTE — PROGRESS NOTE ADULT - REASON FOR ADMISSION
elevated proBNP
acute on chronic systolic heart failure
acute on chronic systolic heart failure
elevated proBNP
elevated proBNP, now with ongoing fevers
volume overload
elevated proBNP

## 2019-01-16 NOTE — PROCEDURE NOTE - NSFINDINGS_GEN_A_CORE
hematuria/positive return of urine in the collection bag positive return of urine in the collection bag

## 2019-01-18 RX ORDER — ACETAMINOPHEN 160 MG/5ML
160 SOLUTION ORAL
Refills: 0 | Status: ACTIVE | COMMUNITY

## 2019-01-18 RX ORDER — TIZANIDINE 4 MG/1
4 TABLET ORAL TWICE DAILY
Refills: 0 | Status: ACTIVE | COMMUNITY

## 2019-01-18 RX ORDER — LUBIPROSTONE 24 UG/1
24 CAPSULE, GELATIN COATED ORAL TWICE DAILY
Qty: 180 | Refills: 0 | Status: DISCONTINUED | COMMUNITY
Start: 2017-06-15 | End: 2019-01-18

## 2019-01-18 RX ORDER — MIRTAZAPINE 7.5 MG/1
7.5 TABLET, FILM COATED ORAL
Refills: 0 | Status: ACTIVE | COMMUNITY

## 2019-01-18 RX ORDER — CLOPIDOGREL BISULFATE 75 MG/1
75 TABLET, FILM COATED ORAL DAILY
Qty: 90 | Refills: 1 | Status: DISCONTINUED | COMMUNITY
Start: 2017-06-15 | End: 2019-01-18

## 2019-01-18 RX ORDER — QUETIAPINE FUMARATE 25 MG/1
25 TABLET ORAL
Refills: 0 | Status: ACTIVE | COMMUNITY

## 2019-01-18 RX ORDER — BUDESONIDE AND FORMOTEROL FUMARATE DIHYDRATE 160; 4.5 UG/1; UG/1
160-4.5 AEROSOL RESPIRATORY (INHALATION) TWICE DAILY
Refills: 0 | Status: ACTIVE | COMMUNITY

## 2019-01-18 RX ORDER — METFORMIN HYDROCHLORIDE 500 MG/1
500 TABLET, COATED ORAL DAILY
Refills: 0 | Status: ACTIVE | COMMUNITY

## 2019-01-18 RX ORDER — FLUTICASONE PROPIONATE AND SALMETEROL 50; 500 UG/1; UG/1
500-50 POWDER RESPIRATORY (INHALATION) TWICE DAILY
Qty: 1 | Refills: 5 | Status: DISCONTINUED | COMMUNITY
Start: 2018-07-18 | End: 2019-01-18

## 2019-01-18 RX ORDER — APIXABAN 5 MG/1
5 TABLET, FILM COATED ORAL
Qty: 60 | Refills: 0 | Status: ACTIVE | COMMUNITY

## 2019-01-18 RX ORDER — PREDNISONE 2.5 MG/1
2.5 TABLET ORAL
Refills: 0 | Status: ACTIVE | COMMUNITY

## 2019-01-18 RX ORDER — IPRATROPIUM BROMIDE 42 UG/1
SPRAY NASAL
Refills: 0 | Status: ACTIVE | COMMUNITY

## 2019-01-18 RX ORDER — QUETIAPINE FUMARATE 50 MG/1
50 TABLET ORAL
Qty: 60 | Refills: 0 | Status: DISCONTINUED | COMMUNITY
Start: 2017-11-28 | End: 2019-01-18

## 2019-01-18 RX ORDER — ATORVASTATIN CALCIUM 20 MG/1
20 TABLET, FILM COATED ORAL
Qty: 90 | Refills: 3 | Status: DISCONTINUED | COMMUNITY
Start: 2017-06-15 | End: 2019-01-18

## 2019-01-18 RX ORDER — ERGOCALCIFEROL (VITAMIN D2) 200 MCG/ML
DROPS ORAL
Refills: 0 | Status: ACTIVE | COMMUNITY

## 2019-01-18 RX ORDER — GABAPENTIN 250 MG/5ML
250 SOLUTION ORAL
Refills: 0 | Status: ACTIVE | COMMUNITY

## 2019-01-18 RX ORDER — CLOBETASOL PROPIONATE 0.5 MG/G
0.05 OINTMENT TOPICAL
Refills: 0 | Status: ACTIVE | COMMUNITY

## 2019-01-18 RX ORDER — HYDROCHLOROTHIAZIDE AND TRIAMTERENE 25; 37.5 MG/1; MG/1
37.5-25 CAPSULE ORAL DAILY
Refills: 0 | Status: DISCONTINUED | COMMUNITY
End: 2019-01-18

## 2019-01-18 RX ORDER — IPRATROPIUM/ALBUTEROL SULFATE 0.5-3MG/3
0.5-2.5 (3) AMPUL FOR NEBULIZATION (ML) INHALATION
Refills: 0 | Status: ACTIVE | COMMUNITY

## 2019-01-18 RX ORDER — FAMOTIDINE 20 MG/1
20 TABLET, FILM COATED ORAL
Refills: 0 | Status: DISCONTINUED | COMMUNITY
End: 2019-01-18

## 2019-01-18 RX ORDER — FERROUS SULFATE 300 MG/5ML
300 (60 FE) SOLUTION ORAL
Refills: 0 | Status: ACTIVE | COMMUNITY

## 2019-01-18 RX ORDER — CLONAZEPAM 1 MG/1
1 TABLET ORAL
Qty: 60 | Refills: 0 | Status: DISCONTINUED | COMMUNITY
Start: 2018-07-11 | End: 2019-01-18

## 2019-01-18 RX ORDER — LISINOPRIL 5 MG/1
5 TABLET ORAL DAILY
Refills: 0 | Status: ACTIVE | COMMUNITY

## 2019-01-18 RX ORDER — NYSTATIN 100000 1/G
100000 POWDER TOPICAL
Refills: 0 | Status: ACTIVE | COMMUNITY

## 2019-01-19 LAB
CULTURE RESULTS: SIGNIFICANT CHANGE UP
SPECIMEN SOURCE: SIGNIFICANT CHANGE UP

## 2019-01-22 ENCOUNTER — APPOINTMENT (OUTPATIENT)
Age: 69
End: 2019-01-22

## 2019-01-22 VITALS
TEMPERATURE: 97.3 F | HEART RATE: 98 BPM | OXYGEN SATURATION: 95 % | DIASTOLIC BLOOD PRESSURE: 68 MMHG | RESPIRATION RATE: 16 BRPM | SYSTOLIC BLOOD PRESSURE: 114 MMHG

## 2019-01-22 DIAGNOSIS — G89.29 OTHER CHRONIC PAIN: ICD-10-CM

## 2019-01-22 DIAGNOSIS — Z92.89 PERSONAL HISTORY OF OTHER MEDICAL TREATMENT: ICD-10-CM

## 2019-01-22 DIAGNOSIS — J45.909 UNSPECIFIED ASTHMA, UNCOMPLICATED: ICD-10-CM

## 2019-01-22 DIAGNOSIS — R13.10 DYSPHAGIA, UNSPECIFIED: ICD-10-CM

## 2019-01-22 DIAGNOSIS — I50.9 HEART FAILURE, UNSPECIFIED: ICD-10-CM

## 2019-01-23 PROBLEM — R13.10 DYSPHAGIA: Status: ACTIVE | Noted: 2019-01-23

## 2019-01-23 PROBLEM — Z92.89 CHRONIC INDWELLING FOLEY CATHETER: Status: ACTIVE | Noted: 2019-01-23

## 2019-01-23 PROBLEM — I50.9 HEART FAILURE: Status: ACTIVE | Noted: 2019-01-23

## 2019-01-23 RX ORDER — LIDOCAINE HYDROCHLORIDE 20 MG/ML
2 SOLUTION OROPHARYNGEAL
Qty: 5 | Refills: 0 | Status: DISCONTINUED | COMMUNITY
Start: 2017-07-10 | End: 2019-01-23

## 2019-01-23 RX ORDER — OMEPRAZOLE MAGNESIUM 20 MG/1
20 TABLET, DELAYED RELEASE ORAL
Refills: 0 | Status: DISCONTINUED | COMMUNITY
End: 2019-01-23

## 2019-01-23 RX ORDER — FAMOTIDINE 20 MG/1
20 TABLET, FILM COATED ORAL DAILY
Refills: 0 | Status: ACTIVE | COMMUNITY

## 2019-01-23 RX ORDER — PREGABALIN 25 MG/1
25 CAPSULE ORAL
Qty: 90 | Refills: 2 | Status: DISCONTINUED | COMMUNITY
Start: 2017-12-01 | End: 2019-01-23

## 2019-01-23 RX ORDER — VALACYCLOVIR 1 G/1
1 TABLET, FILM COATED ORAL DAILY
Qty: 90 | Refills: 3 | Status: DISCONTINUED | COMMUNITY
Start: 2017-06-15 | End: 2019-01-23

## 2019-01-23 RX ORDER — CLONAZEPAM 0.5 MG/1
0.5 TABLET ORAL
Refills: 0 | Status: ACTIVE | COMMUNITY

## 2019-01-23 NOTE — PHYSICAL EXAM
[No JVD] : no jugular venous distention [No Respiratory Distress] : no respiratory distress  [Clear Bilaterally] : the lungs were clear to auscultation bilaterally [Rales / Crackles Bilateral Bases] : crackles were heard over both bases [Wheezing Bilaterally] : no wheezing was heard [Normal Rate] : normal [Rhythm Regular] : regular [Normal S1] : normal S1 [Normal S2] : normal S2 [Pedal Pulses Present] : the pedal pulses are present [No Edema] : there was no peripheral edema [Soft] : abdomen soft [Non Tender] : non-tender [Non-distended] : non-distended [Normal Bowel Sounds] : normal bowel sounds [Skin Lesions 1] : Skin lesion: [Number of Ulcers: ___] : [unfilled] [Location: ___] : [unfilled] [Sacrum] : on the sacrum [de-identified] : peg [de-identified] : gavin changed on 1/16/19 [FreeTextEntry1] : chronic gavin- clear yellow urine [de-identified] : b/l lower extremites contracted [de-identified] : advanced dementia

## 2019-01-23 NOTE — HISTORY OF PRESENT ILLNESS
[Spouse] : spouse [Family Member] : family member [FreeTextEntry1] : Admitted at [facility] for Heart failure. patient is high risk for readmission. Patient seen in the home today.  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 Admitted with biventricular congestive heart failure.\par TTE on this admission shows reduced LVEF, elevated pulmonary pressures. S/P IV Lasix Lasix 20mg IV and transitioned to Lasix 40 via PEG\par Course complicated by fevers. 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 Repeat ucx- yeast- colonizing vince, difficult to implicate as source of fever; even with such, non albicans candida would require course of IV tx.  Sacral decubitus described as not infected- cx with typical colonizing vince\par Also found to have extensive DVT R LE- ?link to fever\par Patient with intermittent low grade fever and her leukocytosis has waxed and waned and is likely secondary DVT inflammatory Vs microaspiration."\par \par

## 2019-01-23 NOTE — REVIEW OF SYSTEMS
[Negative] : Cardiovascular [FreeTextEntry2] : persistent fevers [FreeTextEntry6] : asthma [FreeTextEntry7] : peg with tube feedings [FreeTextEntry8] : gavin [FreeTextEntry9] : contracted-b/l lower extremities [de-identified] : pressure ulcer-sacral [de-identified] : advanced dementia

## 2019-01-23 NOTE — PLAN
[FreeTextEntry1] : Patient's PCP, Dr. Bolden to make home visit today. Home care RN present during home visit. Caregiver was informed about CN’s role/ STARS program and overview of transitional care reviewed with patient. Caregiver educated on topics of importance such as compliance with all provider visits, prescribed medication regimen, and dysphagia diet. Caregiver encouraged calling CN with any issues, concerns or questions, also educated to notify CN if experiencing CP, SOB , cough, increased mucus/phlegm production, abdominal discomfort/swelling, difficulty sleeping or lying flat, fever, chills, fatigue, weight gain of 2-3lbs in 24 hours or 5lbs in one week, dizziness, lightheadedness, n/v/d/c, swelling to extremities and/or any c/o or concerns. Reassurance provided. Will continue to monitor.

## 2019-01-31 ENCOUNTER — RESULT REVIEW (OUTPATIENT)
Age: 69
End: 2019-01-31

## 2019-02-01 ENCOUNTER — APPOINTMENT (OUTPATIENT)
Dept: WOUND CARE | Facility: CLINIC | Age: 69
End: 2019-02-01
Payer: MEDICARE

## 2019-02-01 DIAGNOSIS — K51.90 ULCERATIVE COLITIS, UNSPECIFIED, W/OUT COMPLICATIONS: ICD-10-CM

## 2019-02-01 DIAGNOSIS — G30.0 ALZHEIMER'S DISEASE WITH EARLY ONSET: ICD-10-CM

## 2019-02-01 DIAGNOSIS — F02.81 ALZHEIMER'S DISEASE WITH EARLY ONSET: ICD-10-CM

## 2019-02-01 DIAGNOSIS — I82.409 ACUTE EMBOLISM AND THROMBOSIS OF UNSPECIFIED DEEP VEINS OF UNSPECIFIED LOWER EXTREMITY: ICD-10-CM

## 2019-02-01 PROCEDURE — 99203 OFFICE O/P NEW LOW 30 MIN: CPT | Mod: 25

## 2019-02-01 PROCEDURE — 11042 DBRDMT SUBQ TIS 1ST 20SQCM/<: CPT

## 2019-02-01 PROCEDURE — 99213 OFFICE O/P EST LOW 20 MIN: CPT | Mod: 25

## 2019-02-01 NOTE — HISTORY OF PRESENT ILLNESS
[FreeTextEntry1] : 67 yo female, bedbound, h/o Dementia, had prolonged hospitalization for fracture, and re fracture of right hip- femur\par Required VAC for closure\par Has developed a sacral wound cared for by home care and wound care nurses , which family reports was found after [facility] stay\par Records reviewed\par  reports that he administered medical marijuana to patient prior to today's visit\par Patient is on prednisone for asthma, and has been reported to have low grade temp\par She is also on ac for DVT of right leg\par She has regained some of lost weight since rehab d/c

## 2019-02-01 NOTE — REASON FOR VISIT
[Consultation] : a consultation visit [Spouse] : spouse [Formal Caregiver] : formal caregiver [Family Member] : family member [FreeTextEntry1] : sacral wound

## 2019-02-01 NOTE — PHYSICAL EXAM
[4 x 4] : 4 x 4  [JVD] : no jugular venous distention  [Alert] : not alert [de-identified] : cachectic, minimally verbal, contracted , bedbound female in hospital gown  [de-identified] : appears older than stated age [de-identified] : non labored [de-identified] : PEG [de-identified] : Contracted , non ambulatory, right upper leg scar- extensive [FreeTextEntry1] : sacral decubitus [FreeTextEntry2] : 5 cm [FreeTextEntry3] : 5 cm [FreeTextEntry4] : 1 cm [de-identified] : 12-3: 2.5 cm [FreeTextEntry5] : #10 scalpel  [de-identified] : Dakin's [de-identified] : sharp debridement to presacral fascia done using #10 blade\par well tolerated \par no bleeding\par \par D/C Medihoney to wound - use Dakin's

## 2019-02-15 ENCOUNTER — APPOINTMENT (OUTPATIENT)
Dept: WOUND CARE | Facility: CLINIC | Age: 69
End: 2019-02-15
Payer: MEDICARE

## 2019-02-15 VITALS — TEMPERATURE: 98.3 F

## 2019-02-15 DIAGNOSIS — F02.80 ALZHEIMER'S DISEASE, UNSPECIFIED: ICD-10-CM

## 2019-02-15 DIAGNOSIS — L89.154 PRESSURE ULCER OF SACRAL REGION, STAGE 4: ICD-10-CM

## 2019-02-15 DIAGNOSIS — G30.9 ALZHEIMER'S DISEASE, UNSPECIFIED: ICD-10-CM

## 2019-02-15 PROCEDURE — 99213 OFFICE O/P EST LOW 20 MIN: CPT

## 2019-02-15 NOTE — ASSESSMENT
[FreeTextEntry1] : status fully discussed with , Caleb< two daughters , and aide\par given perineal maceration , advised to no longer use diapers , and use purple pads.\par Catherine placed on macerated skin\par RTO 2 wks\par \par 2/15/19- Elizabeth wound maceration significantly improved  reports less frequent BMs with modification of G Tube feeds RTO 2 wks Path discussed\par \par \par

## 2019-02-15 NOTE — PHYSICAL EXAM
[4 x 4] : 4 x 4  [JVD] : no jugular venous distention  [Alert] : not alert [de-identified] : cachectic, minimally verbal, contracted , bedbound female in hospital gown  [de-identified] : appears older than stated age [de-identified] : non labored [de-identified] : PEG [de-identified] : Contracted , non ambulatory, right upper leg scar- extensive [FreeTextEntry1] : sacral decubitus [FreeTextEntry2] : 5 cm [FreeTextEntry3] : 6 cm [FreeTextEntry4] : 1 cm [de-identified] : 12 -3: 2.5 cm [FreeTextEntry5] : 4x4 [de-identified] : Dakin's [de-identified] : sharp debridement to presacral fascia done using #10 blade\par well tolerated \par no bleeding\par \par D/C Medihoney to wound - use Dakin's\par \par 2/15/19-Bone in close proximity to base of wound , but not visible\par Continue Dakin with plan to transition to VAC

## 2019-02-15 NOTE — HISTORY OF PRESENT ILLNESS
[FreeTextEntry1] : 69 yo female, bedbound, h/o Dementia, had prolonged hospitalization for fracture, and re fracture of right hip- femur\par Required VAC for closure\par Has developed a sacral wound cared for by home care and wound care nurses , which family reports was found after [facility] stay\par Records reviewed\par  reports that he administered medical marijuana to patient prior to today's visit\par Patient is on prednisone for asthma, and has been reported to have low grade temp\par She is also on ac for DVT of right leg\par She has regained some of lost weight since rehab d/c\par 2/15/19 -  reports a beneficial effect to patient  in use of medical marijuana

## 2019-02-15 NOTE — REASON FOR VISIT
[Spouse] : spouse [Formal Caregiver] : formal caregiver [Family Member] : family member [Follow-Up: _____] : a [unfilled] follow-up visit [FreeTextEntry1] : sacral wound

## 2019-02-26 ENCOUNTER — INPATIENT (INPATIENT)
Facility: HOSPITAL | Age: 69
LOS: 19 days | Discharge: HOME | DRG: 871 | End: 2019-03-18
Attending: FAMILY MEDICINE | Admitting: INTERNAL MEDICINE
Payer: MEDICARE

## 2019-02-26 VITALS
WEIGHT: 100.09 LBS | TEMPERATURE: 99 F | RESPIRATION RATE: 16 BRPM | SYSTOLIC BLOOD PRESSURE: 133 MMHG | OXYGEN SATURATION: 94 % | HEART RATE: 115 BPM | DIASTOLIC BLOOD PRESSURE: 79 MMHG

## 2019-02-26 DIAGNOSIS — Z90.710 ACQUIRED ABSENCE OF BOTH CERVIX AND UTERUS: Chronic | ICD-10-CM

## 2019-02-26 DIAGNOSIS — S42.293A OTHER DISPLACED FRACTURE OF UPPER END OF UNSPECIFIED HUMERUS, INITIAL ENCOUNTER FOR CLOSED FRACTURE: Chronic | ICD-10-CM

## 2019-02-26 DIAGNOSIS — Z98.42 CATARACT EXTRACTION STATUS, LEFT EYE: Chronic | ICD-10-CM

## 2019-02-26 DIAGNOSIS — Z96.659 PRESENCE OF UNSPECIFIED ARTIFICIAL KNEE JOINT: Chronic | ICD-10-CM

## 2019-02-26 DIAGNOSIS — A41.9 SEPSIS, UNSPECIFIED ORGANISM: ICD-10-CM

## 2019-02-26 LAB
ALBUMIN SERPL ELPH-MCNC: 3.7 G/DL — SIGNIFICANT CHANGE UP (ref 3.3–5)
ALP SERPL-CCNC: 92 U/L — SIGNIFICANT CHANGE UP (ref 40–120)
ALT FLD-CCNC: 18 U/L — SIGNIFICANT CHANGE UP (ref 10–45)
ANION GAP SERPL CALC-SCNC: 19 MMOL/L — HIGH (ref 5–17)
APPEARANCE UR: CLEAR — SIGNIFICANT CHANGE UP
APTT BLD: 44 SEC — HIGH (ref 27.5–36.3)
AST SERPL-CCNC: 23 U/L — SIGNIFICANT CHANGE UP (ref 10–40)
BACTERIA # UR AUTO: ABNORMAL
BASOPHILS # BLD AUTO: 0.1 K/UL — SIGNIFICANT CHANGE UP (ref 0–0.2)
BASOPHILS NFR BLD AUTO: 0.2 % — SIGNIFICANT CHANGE UP (ref 0–2)
BILIRUB SERPL-MCNC: 0.3 MG/DL — SIGNIFICANT CHANGE UP (ref 0.2–1.2)
BILIRUB UR-MCNC: NEGATIVE — SIGNIFICANT CHANGE UP
BUN SERPL-MCNC: 64 MG/DL — HIGH (ref 7–23)
C DIFF GDH STL QL: NEGATIVE — SIGNIFICANT CHANGE UP
C DIFF GDH STL QL: SIGNIFICANT CHANGE UP
CALCIUM SERPL-MCNC: 10 MG/DL — SIGNIFICANT CHANGE UP (ref 8.4–10.5)
CHLORIDE SERPL-SCNC: 97 MMOL/L — SIGNIFICANT CHANGE UP (ref 96–108)
CO2 SERPL-SCNC: 24 MMOL/L — SIGNIFICANT CHANGE UP (ref 22–31)
COLOR SPEC: YELLOW — SIGNIFICANT CHANGE UP
CREAT SERPL-MCNC: 0.53 MG/DL — SIGNIFICANT CHANGE UP (ref 0.5–1.3)
DIFF PNL FLD: ABNORMAL
EOSINOPHIL # BLD AUTO: 0.1 K/UL — SIGNIFICANT CHANGE UP (ref 0–0.5)
EOSINOPHIL NFR BLD AUTO: 0.6 % — SIGNIFICANT CHANGE UP (ref 0–6)
EPI CELLS # UR: 0 /HPF — SIGNIFICANT CHANGE UP
GAS PNL BLDV: SIGNIFICANT CHANGE UP
GLUCOSE SERPL-MCNC: 166 MG/DL — HIGH (ref 70–99)
GLUCOSE UR QL: NEGATIVE — SIGNIFICANT CHANGE UP
HCT VFR BLD CALC: 33.8 % — LOW (ref 34.5–45)
HGB BLD-MCNC: 11.3 G/DL — LOW (ref 11.5–15.5)
HYALINE CASTS # UR AUTO: 0 /LPF — SIGNIFICANT CHANGE UP (ref 0–2)
INR BLD: 1.59 RATIO — HIGH (ref 0.88–1.16)
KETONES UR-MCNC: NEGATIVE — SIGNIFICANT CHANGE UP
LEUKOCYTE ESTERASE UR-ACNC: ABNORMAL
LYMPHOCYTES # BLD AUTO: 1.5 K/UL — SIGNIFICANT CHANGE UP (ref 1–3.3)
LYMPHOCYTES # BLD AUTO: 5.7 % — LOW (ref 13–44)
MCHC RBC-ENTMCNC: 30.1 PG — SIGNIFICANT CHANGE UP (ref 27–34)
MCHC RBC-ENTMCNC: 33.4 GM/DL — SIGNIFICANT CHANGE UP (ref 32–36)
MCV RBC AUTO: 90.3 FL — SIGNIFICANT CHANGE UP (ref 80–100)
MONOCYTES # BLD AUTO: 2.1 K/UL — HIGH (ref 0–0.9)
MONOCYTES NFR BLD AUTO: 8 % — SIGNIFICANT CHANGE UP (ref 2–14)
NEUTROPHILS # BLD AUTO: 22.4 K/UL — HIGH (ref 1.8–7.4)
NEUTROPHILS NFR BLD AUTO: 85.5 % — HIGH (ref 43–77)
NITRITE UR-MCNC: NEGATIVE — SIGNIFICANT CHANGE UP
OB PNL STL: NEGATIVE — SIGNIFICANT CHANGE UP
PH UR: 7 — SIGNIFICANT CHANGE UP (ref 5–8)
PLATELET # BLD AUTO: 534 K/UL — HIGH (ref 150–400)
POTASSIUM SERPL-MCNC: 4.9 MMOL/L — SIGNIFICANT CHANGE UP (ref 3.5–5.3)
POTASSIUM SERPL-SCNC: 4.9 MMOL/L — SIGNIFICANT CHANGE UP (ref 3.5–5.3)
PROCALCITONIN SERPL-MCNC: 0.74 NG/ML — HIGH (ref 0.02–0.1)
PROT SERPL-MCNC: 6.9 G/DL — SIGNIFICANT CHANGE UP (ref 6–8.3)
PROT UR-MCNC: ABNORMAL
PROTHROM AB SERPL-ACNC: 18.6 SEC — HIGH (ref 10–12.9)
RAPID RVP RESULT: SIGNIFICANT CHANGE UP
RBC # BLD: 3.74 M/UL — LOW (ref 3.8–5.2)
RBC # FLD: 13.4 % — SIGNIFICANT CHANGE UP (ref 10.3–14.5)
RBC CASTS # UR COMP ASSIST: 17 /HPF — HIGH (ref 0–4)
SODIUM SERPL-SCNC: 140 MMOL/L — SIGNIFICANT CHANGE UP (ref 135–145)
SP GR SPEC: 1.02 — SIGNIFICANT CHANGE UP (ref 1.01–1.02)
UROBILINOGEN FLD QL: NEGATIVE — SIGNIFICANT CHANGE UP
WBC # BLD: 26.2 K/UL — HIGH (ref 3.8–10.5)
WBC # FLD AUTO: 26.2 K/UL — HIGH (ref 3.8–10.5)
WBC UR QL: 27 /HPF — HIGH (ref 0–5)

## 2019-02-26 PROCEDURE — 73522 X-RAY EXAM HIPS BI 3-4 VIEWS: CPT | Mod: 26

## 2019-02-26 PROCEDURE — 99285 EMERGENCY DEPT VISIT HI MDM: CPT | Mod: 25

## 2019-02-26 PROCEDURE — 71045 X-RAY EXAM CHEST 1 VIEW: CPT | Mod: 26

## 2019-02-26 PROCEDURE — 93010 ELECTROCARDIOGRAM REPORT: CPT

## 2019-02-26 PROCEDURE — 99223 1ST HOSP IP/OBS HIGH 75: CPT

## 2019-02-26 RX ORDER — LINEZOLID 600 MG/300ML
600 INJECTION, SOLUTION INTRAVENOUS ONCE
Qty: 0 | Refills: 0 | Status: COMPLETED | OUTPATIENT
Start: 2019-02-26 | End: 2019-02-26

## 2019-02-26 RX ORDER — CEFEPIME 1 G/1
2000 INJECTION, POWDER, FOR SOLUTION INTRAMUSCULAR; INTRAVENOUS ONCE
Qty: 0 | Refills: 0 | Status: COMPLETED | OUTPATIENT
Start: 2019-02-26 | End: 2019-02-26

## 2019-02-26 RX ORDER — ACETAMINOPHEN 500 MG
975 TABLET ORAL ONCE
Qty: 0 | Refills: 0 | Status: COMPLETED | OUTPATIENT
Start: 2019-02-26 | End: 2019-02-26

## 2019-02-26 RX ORDER — OMEPRAZOLE 10 MG/1
2 CAPSULE, DELAYED RELEASE ORAL
Qty: 0 | Refills: 0 | COMMUNITY

## 2019-02-26 RX ORDER — SODIUM CHLORIDE 9 MG/ML
1000 INJECTION INTRAMUSCULAR; INTRAVENOUS; SUBCUTANEOUS ONCE
Qty: 0 | Refills: 0 | Status: DISCONTINUED | OUTPATIENT
Start: 2019-02-26 | End: 2019-02-26

## 2019-02-26 RX ADMIN — Medication 975 MILLIGRAM(S): at 22:07

## 2019-02-26 RX ADMIN — CEFEPIME 100 MILLIGRAM(S): 1 INJECTION, POWDER, FOR SOLUTION INTRAMUSCULAR; INTRAVENOUS at 23:27

## 2019-02-26 NOTE — H&P ADULT - PROBLEM SELECTOR PLAN 2
See above.   On maintenance medications as above--spouse is unwilling for review of Rx but is willing for HOLDING Zoloft and Remeron due to the dose of Linezolid in the ER.

## 2019-02-26 NOTE — H&P ADULT - PSH
H/O cataract extraction, left    H/O: hysterectomy    History of knee replacement    Humeral head fracture
No

## 2019-02-26 NOTE — ED PROVIDER NOTE - PROGRESS NOTE DETAILS
BRET Luna: paged ID as patient has drug allergies to broad spectrum antibiotics (vanco/PCN). Upon review past cultures ID Yohannes Gonzalez recommended linezolid 600 and cefepime 2g BRET Luna: Discussed case with Dr. Bolden who accepted patient and will consult ID tomorrow. Agreed with choice IV abx

## 2019-02-26 NOTE — H&P ADULT - PROBLEM SELECTOR PLAN 4
Presumed asthma--will resume patient's Prednisone.  Duoneb around the clock.   Will assume aspiration risk.

## 2019-02-26 NOTE — H&P ADULT - NSHPSOURCEINFOTX_GEN_ALL_CORE
History and Medex from spouse in attendance. History and Medex from spouse in attendance.  Adult daughters in attendance.

## 2019-02-26 NOTE — H&P ADULT - NSHPLABSRESULTS_GEN_ALL_CORE
WESR 101      WBC 26.2.  Hgb 11.3.  Platelets of 534K.  INR 1.5 on apixaban.    U/A difficult to interpret with chronic Blackman with 27 WBC, large leukocyte esterase.    K+ 4.9   Random glucose of 166.  CR 0.5.  Alb 3.7.  EKG tracing reviewed with sinus tachycardia at 113 with inverted T waves V3-V6, Q II< III< F present from last EKG.    Chest radiograph reviewed with poor technique with rotated study but blunted LEFT costophrenic angle. WESR 101      WBC 26.2.  Hgb 11.3.  Platelets of 534K.  INR 1.5 on apixaban.    U/A difficult to interpret with chronic Blackman with 27 WBC, large leukocyte esterase.    K+ 4.9   Random glucose of 166.  CR 0.5.  Alb 3.7.  EKG tracing reviewed with sinus tachycardia at 113 with inverted T waves V3-V6, Q II< III< F present from last EKG.    Chest radiograph reviewed with poor technique with rotated study but blunted LEFT costophrenic angle.  Hip films nondiagnostic.

## 2019-02-26 NOTE — ED PROVIDER NOTE - ATTENDING CONTRIBUTION TO CARE
Attending MD Tariq:   I personally have seen and examined this patient.  Physician assistant note reviewed and agree on plan of care and except where noted.  See below for details.     Seen in [place] [facility], accompanied by family    68F with PMH including advanced dementia, bedbound, sacral ulcer, heel pressure ulcers, HLD, CVA, UC, R prosthetic hip s/p MRSA infection (7/2018), CHF presents to the ED BIBEMS sent in by PMD after fever at home.  History provided by family.  Patient recently on a medrol dosepak for an asthma exacerbation.  Recent diarrhea.  Sacral wound cared for by the wound clinic.  Unable to obtain ROS.  On exam, thin, appears chronically ill, +S1S2 no m/r/g, lungs with decreased lung sounds at bilateral bases, no increased work of breathing, scattered wheezing, abdominal exam, soft, ND, PEG C/D/I, unstageable sacral decub, pressure ulcer of heels; A/P: 68F with fever of unknown etiology, DDx includes UTI, PNA, ?hip, will proceed with infectious work up including labs, cultures, UA, CXR, will give IV abx, Tylenol, admit

## 2019-02-26 NOTE — INPATIENT CERTIFICATION FOR MEDICARE PATIENTS - RISKS OF ADVERSE EVENTS
Concern for neurologic deterioration/Concern for worsening infectious process/Concern for delay in diagnosis and treatment

## 2019-02-26 NOTE — H&P ADULT - ATTENDING COMMENTS
NIGHT HOSPITALIST:   Spouse, adult daughters in attendance, aware of course and agree with plan/care as above.   Given patient's comorbidities, patient's prognosis is poor.   Emotional support provided to spouse/daughters.   Family is not yet ready to discuss advance directives nor goals of care.   Care reviewed with covering NP/PA.  Care assumed by Dr. Bolden.    Bhavesh Ramírez MD  [phone]

## 2019-02-26 NOTE — H&P ADULT - NSHPPHYSICALEXAM_GEN_ALL_CORE
Physical exam with a contracted, emaciated, chronically ill appearing F.  Non verbal, noncommunicative. Physical exam with a contracted, emaciated, chronically ill appearing F.  Non verbal, noncommunicative.  Tm 39.3C.  HR  113   RR 14   BP  145/85  94% on RA.  HEENT< bitemporal wasting. Sunken cheeks  Neck with no cervical adenopathy.  No thyromegaly.  Chest with bony rib cage, occasional scattered wheeze, decreased breath sounds at the bases.  Cor s1 s2 tachycardia.  Family refused breast exam.  Abdomen scaphoid, soft, PEG c/d/i.  normal bowel sounds.  no rebound  5-6 cm un stage able sacral decubitus ulcer observed with patient's primary RN in the ER.   RIGHT hip decubitus.  No joint effusion.  Diffuse flexion contractures.  Neurologic exam awake.  NON verbal, noncommunicative.  Does not follow commands.  Inconsistently moves eyes to spouse bedside to voice.

## 2019-02-26 NOTE — ED PROVIDER NOTE - OBJECTIVE STATEMENT
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 Bolden as patient had fever today at 19:00 brought in by EMS with family at bedside . History provided by  as patient is nonverbal. Per , patient recently prescribed medrol dose joeslin last dose today for asthma exacerbation. Had two episodes of liquidy diarrhea and has not recently been on course of antibiotics. Has sacral wound that is being taken care of by wound clinic

## 2019-02-26 NOTE — H&P ADULT - PROBLEM SELECTOR PLAN 1
See above.   Wound care evaluation.  Received IV antibiotics as above--would consider ID consult in the AM for further review of antibiotics.

## 2019-02-26 NOTE — H&P ADULT - NSHPOUTPATIENTPROVIDERS_GEN_ALL_CORE
MD Carlito Saba MD (Pulmonary) [phone]  Rustam Whitehead MD (cardiology) [phone] MD Carlito Saba MD (Pulmonary) [phone]  Rustam Moore MD (ID) [phone]  Rustam Whitehead MD (cardiology) [phone] MD Carlito Dunn MD (Pulmonary) [phone]  Rustam Moore MD (ID) [phone]  Rustam Whitehead MD (cardiology) [phone]

## 2019-02-26 NOTE — ED ADULT NURSE NOTE - NSIMPLEMENTINTERV_GEN_ALL_ED
Implemented All Fall with Harm Risk Interventions:  [place] to call system. Call bell, personal items and telephone within reach. Instruct patient to call for assistance. Room bathroom lighting operational. Non-slip footwear when patient is off stretcher. Physically safe environment: no spills, clutter or unnecessary equipment. Stretcher in lowest position, wheels locked, appropriate side rails in place. Provide visual cue, wrist band, yellow gown, etc. Monitor gait and stability. Monitor for mental status changes and reorient to person, place, and time. Review medications for side effects contributing to fall risk. Reinforce activity limits and safety measures with patient and family. Provide visual clues: red socks.

## 2019-02-26 NOTE — H&P ADULT - HISTORY OF PRESENT ILLNESS
NIGHT HOSPITALIST:   Patient UNKNOWN to me previously, assigned to me at this point via the ER and by Dr. Bolden to admit this 69 y/o F--patient seen with patient's spouse and adult daughters in attendance--patient with a history of severely impaired functional and cognitive impairment in the setting of advanced dementia (nonverbal, noncommunicative, chronic gastrostomy tube, bed confined with functional quadriplegia, un stage able sacral and RIGHT hip decubiti, heel ulcers, presumed asthma on a recent tapering solumedrol but is normally apparently steroid dependent on 7.5 mg Prednisone daily, past RIGHT hip MRSA infection, apparent systolic LV dysfunction, benzodiazepine dependent (see [place] I Stop # [id number]) and on skeletal muscle relaxants, multiple recent hospitalizations, with the spouse and adult daughters referring spouse following fevers for the past 2 days and coughing at home.  Loose but formed BM and no recent antibiotic use.

## 2019-02-26 NOTE — H&P ADULT - ASSESSMENT
NIGHT HOSPITALIST:  Presentation of patient with severely impaired functional and cognitive status in the setting of advanced dementia (nonverbal, noncommunicative, gastrostomy tube, indwelling Blackman with un-stage-able sacral and multiple ulcers) presumed asthma appears essentially steroid dependent with fever>>suspect primary source is the foul smelling sacral decubitus ulcer, but will assume that patient continues to be an aspiration risk>>will defer PEG feedings until the AM.  Received dose of IV cefepime and IV linezolid in the ER>>patient also has likely concomitant BEERS criteria polypharmacy but patient's spouse is quite adamant that patient continues to receive her tizanidine, gabapentin, quetiapine and clonazepam.  Will continue as such and will give the clonazepam with missed dose at bedtime to avoid withdrawal---see [place] I Stop # [id number] in the chart.    Spouse is aware that will temporarily HOLD the Zoloft and Remeron for the potential of serotonin syndrome given patient's dose of linezolid in the ER.   Will defer to Dr. Bolden further dosing of these two medications in the AM.    Would consider formal ID evaluation in the AM.  Received does of antibiotics in the ER and will defer further antibiotics to the DAY PROVIDER later this AM.    Will provider around the clock Duoneb therapy and will resume patient's Prednisone to 7.5 mg daily from patient's outpatient Medrol pack taper.    Spouse aware of risks/benefits of full AC and agrees to continue with Eliquis as above.

## 2019-02-26 NOTE — H&P ADULT - NSHPREVIEWOFSYSTEMS_GEN_ALL_CORE
Unable to obtain review of systems from patient.   Essentially from spouse and daughters in attendance.  NO hemoptysis.    No hematemesis.  No red blood per PEG or rectum.  NO melena.  No rash.  No node swelling.  No hematuria.  Patient with unspecified weight loss.  No new focal weakness.  No thyroid swelling.  No vaginal bleeding.  Skin with un stage able sacral and RIGHT hip decubitus ulcer.

## 2019-02-26 NOTE — ED ADULT NURSE NOTE - OBJECTIVE STATEMENT
68y female arrived to ED [facility] for fever. PMHx dementia, HLD, CVA, UC, R hip replacement w/ MRSA, CHF. Patient nonverbal, contracted and bedbound, chronic gavin (last changed today by visiting nurse), PEG (some discharge around the skin where PEG is inserted), stage 2 pressure ulcer on the right buttock, unstageable pressure ulcer on the sacrum with tunneling and necrosis, heel pressure ulcers. Patient family at the bedside reports that patient spiked a fever of 103 at home this evening. Patient recent issues with diarrhea.  provided patient Hx. Patient incontinent. Tachycardic and febrile on arrival.

## 2019-02-27 DIAGNOSIS — Z93.1 GASTROSTOMY STATUS: ICD-10-CM

## 2019-02-27 DIAGNOSIS — J45.909 UNSPECIFIED ASTHMA, UNCOMPLICATED: ICD-10-CM

## 2019-02-27 DIAGNOSIS — L89.150 PRESSURE ULCER OF SACRAL REGION, UNSTAGEABLE: ICD-10-CM

## 2019-02-27 DIAGNOSIS — Z86.711 PERSONAL HISTORY OF PULMONARY EMBOLISM: ICD-10-CM

## 2019-02-27 DIAGNOSIS — F03.90 UNSPECIFIED DEMENTIA WITHOUT BEHAVIORAL DISTURBANCE: ICD-10-CM

## 2019-02-27 LAB
ANION GAP SERPL CALC-SCNC: 16 MMOL/L — SIGNIFICANT CHANGE UP (ref 5–17)
BUN SERPL-MCNC: 59 MG/DL — HIGH (ref 7–23)
CALCIUM SERPL-MCNC: 9.3 MG/DL — SIGNIFICANT CHANGE UP (ref 8.4–10.5)
CHLORIDE SERPL-SCNC: 99 MMOL/L — SIGNIFICANT CHANGE UP (ref 96–108)
CO2 SERPL-SCNC: 24 MMOL/L — SIGNIFICANT CHANGE UP (ref 22–31)
CREAT SERPL-MCNC: 0.6 MG/DL — SIGNIFICANT CHANGE UP (ref 0.5–1.3)
CRP SERPL-MCNC: 13.69 MG/DL — HIGH (ref 0–0.4)
CULTURE RESULTS: SIGNIFICANT CHANGE UP
GLUCOSE BLDC GLUCOMTR-MCNC: 104 MG/DL — HIGH (ref 70–99)
GLUCOSE BLDC GLUCOMTR-MCNC: 202 MG/DL — HIGH (ref 70–99)
GLUCOSE BLDC GLUCOMTR-MCNC: 224 MG/DL — HIGH (ref 70–99)
GLUCOSE BLDC GLUCOMTR-MCNC: 224 MG/DL — HIGH (ref 70–99)
GLUCOSE BLDC GLUCOMTR-MCNC: 232 MG/DL — HIGH (ref 70–99)
GLUCOSE SERPL-MCNC: 211 MG/DL — HIGH (ref 70–99)
HBA1C BLD-MCNC: 5.3 % — SIGNIFICANT CHANGE UP (ref 4–5.6)
HCT VFR BLD CALC: 37.1 % — SIGNIFICANT CHANGE UP (ref 34.5–45)
HGB BLD-MCNC: 11.2 G/DL — LOW (ref 11.5–15.5)
MCHC RBC-ENTMCNC: 29.7 PG — SIGNIFICANT CHANGE UP (ref 27–34)
MCHC RBC-ENTMCNC: 30.2 GM/DL — LOW (ref 32–36)
MCV RBC AUTO: 98.4 FL — SIGNIFICANT CHANGE UP (ref 80–100)
NT-PROBNP SERPL-SCNC: 5829 PG/ML — HIGH (ref 0–300)
PLATELET # BLD AUTO: 614 K/UL — HIGH (ref 150–400)
POTASSIUM SERPL-MCNC: 4.5 MMOL/L — SIGNIFICANT CHANGE UP (ref 3.5–5.3)
POTASSIUM SERPL-SCNC: 4.5 MMOL/L — SIGNIFICANT CHANGE UP (ref 3.5–5.3)
RAPID RVP RESULT: SIGNIFICANT CHANGE UP
RBC # BLD: 3.77 M/UL — LOW (ref 3.8–5.2)
RBC # FLD: 14 % — SIGNIFICANT CHANGE UP (ref 10.3–14.5)
SODIUM SERPL-SCNC: 139 MMOL/L — SIGNIFICANT CHANGE UP (ref 135–145)
SPECIMEN SOURCE: SIGNIFICANT CHANGE UP
WBC # BLD: 35.9 K/UL — HIGH (ref 3.8–10.5)
WBC # FLD AUTO: 35.9 K/UL — HIGH (ref 3.8–10.5)

## 2019-02-27 PROCEDURE — 99232 SBSQ HOSP IP/OBS MODERATE 35: CPT

## 2019-02-27 RX ORDER — ALBUTEROL 90 UG/1
1 AEROSOL, METERED ORAL EVERY 4 HOURS
Qty: 0 | Refills: 0 | Status: DISCONTINUED | OUTPATIENT
Start: 2019-02-27 | End: 2019-02-28

## 2019-02-27 RX ORDER — IPRATROPIUM/ALBUTEROL SULFATE 18-103MCG
3 AEROSOL WITH ADAPTER (GRAM) INHALATION EVERY 6 HOURS
Qty: 0 | Refills: 0 | Status: DISCONTINUED | OUTPATIENT
Start: 2019-02-27 | End: 2019-02-28

## 2019-02-27 RX ORDER — CEFEPIME 1 G/1
2000 INJECTION, POWDER, FOR SOLUTION INTRAMUSCULAR; INTRAVENOUS EVERY 12 HOURS
Qty: 0 | Refills: 0 | Status: DISCONTINUED | OUTPATIENT
Start: 2019-02-27 | End: 2019-02-27

## 2019-02-27 RX ORDER — DEXTROSE 50 % IN WATER 50 %
25 SYRINGE (ML) INTRAVENOUS ONCE
Qty: 0 | Refills: 0 | Status: DISCONTINUED | OUTPATIENT
Start: 2019-02-27 | End: 2019-02-28

## 2019-02-27 RX ORDER — ACETAMINOPHEN 500 MG
650 TABLET ORAL EVERY 6 HOURS
Qty: 0 | Refills: 0 | Status: DISCONTINUED | OUTPATIENT
Start: 2019-02-26 | End: 2019-02-28

## 2019-02-27 RX ORDER — GABAPENTIN 400 MG/1
300 CAPSULE ORAL
Qty: 0 | Refills: 0 | Status: DISCONTINUED | OUTPATIENT
Start: 2019-02-27 | End: 2019-02-28

## 2019-02-27 RX ORDER — DEXTROSE 50 % IN WATER 50 %
15 SYRINGE (ML) INTRAVENOUS ONCE
Qty: 0 | Refills: 0 | Status: DISCONTINUED | OUTPATIENT
Start: 2019-02-27 | End: 2019-02-28

## 2019-02-27 RX ORDER — DEXTROSE 50 % IN WATER 50 %
12.5 SYRINGE (ML) INTRAVENOUS ONCE
Qty: 0 | Refills: 0 | Status: DISCONTINUED | OUTPATIENT
Start: 2019-02-27 | End: 2019-02-28

## 2019-02-27 RX ORDER — SODIUM CHLORIDE 9 MG/ML
1000 INJECTION, SOLUTION INTRAVENOUS
Qty: 0 | Refills: 0 | Status: DISCONTINUED | OUTPATIENT
Start: 2019-02-27 | End: 2019-02-28

## 2019-02-27 RX ORDER — LISINOPRIL 2.5 MG/1
5 TABLET ORAL DAILY
Qty: 0 | Refills: 0 | Status: DISCONTINUED | OUTPATIENT
Start: 2019-02-27 | End: 2019-02-28

## 2019-02-27 RX ORDER — SODIUM CHLORIDE 9 MG/ML
1000 INJECTION INTRAMUSCULAR; INTRAVENOUS; SUBCUTANEOUS
Qty: 0 | Refills: 0 | Status: DISCONTINUED | OUTPATIENT
Start: 2019-02-27 | End: 2019-02-27

## 2019-02-27 RX ORDER — GLUCAGON INJECTION, SOLUTION 0.5 MG/.1ML
1 INJECTION, SOLUTION SUBCUTANEOUS ONCE
Qty: 0 | Refills: 0 | Status: DISCONTINUED | OUTPATIENT
Start: 2019-02-27 | End: 2019-02-28

## 2019-02-27 RX ORDER — QUETIAPINE FUMARATE 200 MG/1
25 TABLET, FILM COATED ORAL AT BEDTIME
Qty: 0 | Refills: 0 | Status: DISCONTINUED | OUTPATIENT
Start: 2019-02-27 | End: 2019-02-28

## 2019-02-27 RX ORDER — INSULIN LISPRO 100/ML
VIAL (ML) SUBCUTANEOUS EVERY 6 HOURS
Qty: 0 | Refills: 0 | Status: DISCONTINUED | OUTPATIENT
Start: 2019-02-27 | End: 2019-02-28

## 2019-02-27 RX ORDER — FAMOTIDINE 10 MG/ML
20 INJECTION INTRAVENOUS DAILY
Qty: 0 | Refills: 0 | Status: DISCONTINUED | OUTPATIENT
Start: 2019-02-27 | End: 2019-02-28

## 2019-02-27 RX ORDER — FERROUS SULFATE 325(65) MG
300 TABLET ORAL DAILY
Qty: 0 | Refills: 0 | Status: DISCONTINUED | OUTPATIENT
Start: 2019-02-27 | End: 2019-02-28

## 2019-02-27 RX ORDER — CLONAZEPAM 1 MG
1.5 TABLET ORAL ONCE
Qty: 0 | Refills: 0 | Status: DISCONTINUED | OUTPATIENT
Start: 2019-02-27 | End: 2019-02-27

## 2019-02-27 RX ORDER — SODIUM CHLORIDE 9 MG/ML
1000 INJECTION INTRAMUSCULAR; INTRAVENOUS; SUBCUTANEOUS
Qty: 0 | Refills: 0 | Status: DISCONTINUED | OUTPATIENT
Start: 2019-02-27 | End: 2019-02-28

## 2019-02-27 RX ORDER — ACETAMINOPHEN 500 MG
500 TABLET ORAL ONCE
Qty: 0 | Refills: 0 | Status: COMPLETED | OUTPATIENT
Start: 2019-02-27 | End: 2019-02-27

## 2019-02-27 RX ORDER — MEROPENEM 1 G/30ML
1000 INJECTION INTRAVENOUS EVERY 8 HOURS
Qty: 0 | Refills: 0 | Status: DISCONTINUED | OUTPATIENT
Start: 2019-02-27 | End: 2019-02-28

## 2019-02-27 RX ORDER — APIXABAN 2.5 MG/1
5 TABLET, FILM COATED ORAL EVERY 12 HOURS
Qty: 0 | Refills: 0 | Status: DISCONTINUED | OUTPATIENT
Start: 2019-02-27 | End: 2019-02-28

## 2019-02-27 RX ORDER — SODIUM HYPOCHLORITE 0.125 %
1 SOLUTION, NON-ORAL MISCELLANEOUS
Qty: 0 | Refills: 0 | Status: DISCONTINUED | OUTPATIENT
Start: 2019-02-27 | End: 2019-02-28

## 2019-02-27 RX ORDER — CLONAZEPAM 1 MG
1.5 TABLET ORAL AT BEDTIME
Qty: 0 | Refills: 0 | Status: DISCONTINUED | OUTPATIENT
Start: 2019-02-27 | End: 2019-02-28

## 2019-02-27 RX ORDER — TIOTROPIUM BROMIDE 18 UG/1
1 CAPSULE ORAL; RESPIRATORY (INHALATION) DAILY
Qty: 0 | Refills: 0 | Status: DISCONTINUED | OUTPATIENT
Start: 2019-02-27 | End: 2019-02-28

## 2019-02-27 RX ORDER — TIZANIDINE 4 MG/1
4 TABLET ORAL
Qty: 0 | Refills: 0 | Status: DISCONTINUED | OUTPATIENT
Start: 2019-02-27 | End: 2019-02-28

## 2019-02-27 RX ADMIN — LISINOPRIL 5 MILLIGRAM(S): 2.5 TABLET ORAL at 06:49

## 2019-02-27 RX ADMIN — APIXABAN 5 MILLIGRAM(S): 2.5 TABLET, FILM COATED ORAL at 06:45

## 2019-02-27 RX ADMIN — Medication 650 MILLIGRAM(S): at 11:30

## 2019-02-27 RX ADMIN — TIZANIDINE 4 MILLIGRAM(S): 4 TABLET ORAL at 09:09

## 2019-02-27 RX ADMIN — Medication 650 MILLIGRAM(S): at 10:23

## 2019-02-27 RX ADMIN — MEROPENEM 100 MILLIGRAM(S): 1 INJECTION INTRAVENOUS at 13:27

## 2019-02-27 RX ADMIN — Medication 2: at 06:59

## 2019-02-27 RX ADMIN — GABAPENTIN 300 MILLIGRAM(S): 400 CAPSULE ORAL at 09:09

## 2019-02-27 RX ADMIN — Medication 650 MILLIGRAM(S): at 02:45

## 2019-02-27 RX ADMIN — MEROPENEM 100 MILLIGRAM(S): 1 INJECTION INTRAVENOUS at 22:56

## 2019-02-27 RX ADMIN — Medication 3 MILLILITER(S): at 17:58

## 2019-02-27 RX ADMIN — FAMOTIDINE 20 MILLIGRAM(S): 10 INJECTION INTRAVENOUS at 13:28

## 2019-02-27 RX ADMIN — SODIUM CHLORIDE 70 MILLILITER(S): 9 INJECTION INTRAMUSCULAR; INTRAVENOUS; SUBCUTANEOUS at 00:37

## 2019-02-27 RX ADMIN — QUETIAPINE FUMARATE 25 MILLIGRAM(S): 200 TABLET, FILM COATED ORAL at 22:56

## 2019-02-27 RX ADMIN — TIZANIDINE 4 MILLIGRAM(S): 4 TABLET ORAL at 22:55

## 2019-02-27 RX ADMIN — LINEZOLID 300 MILLIGRAM(S): 600 INJECTION, SOLUTION INTRAVENOUS at 00:37

## 2019-02-27 RX ADMIN — APIXABAN 5 MILLIGRAM(S): 2.5 TABLET, FILM COATED ORAL at 17:58

## 2019-02-27 RX ADMIN — Medication 3 MILLILITER(S): at 11:54

## 2019-02-27 RX ADMIN — SODIUM CHLORIDE 70 MILLILITER(S): 9 INJECTION INTRAMUSCULAR; INTRAVENOUS; SUBCUTANEOUS at 06:49

## 2019-02-27 RX ADMIN — Medication 2: at 13:46

## 2019-02-27 RX ADMIN — SODIUM CHLORIDE 70 MILLILITER(S): 9 INJECTION INTRAMUSCULAR; INTRAVENOUS; SUBCUTANEOUS at 11:54

## 2019-02-27 RX ADMIN — Medication 200 MILLIGRAM(S): at 23:46

## 2019-02-27 RX ADMIN — Medication 3 MILLILITER(S): at 06:45

## 2019-02-27 RX ADMIN — Medication 300 MILLIGRAM(S): at 13:27

## 2019-02-27 RX ADMIN — Medication 1.5 MILLIGRAM(S): at 22:55

## 2019-02-27 RX ADMIN — GABAPENTIN 300 MILLIGRAM(S): 400 CAPSULE ORAL at 17:57

## 2019-02-27 RX ADMIN — Medication 1 APPLICATION(S): at 17:56

## 2019-02-27 RX ADMIN — Medication 7.5 MILLIGRAM(S): at 06:49

## 2019-02-27 NOTE — CONSULT NOTE ADULT - SUBJECTIVE AND OBJECTIVE BOX
Wound SURGERY CONSULT NOTE    HPI:  NIGHT HOSPITALIST: 69 y/o F--patient seen with patient's spouse and adult daughters in attendance--patient with a history of severely impaired functional and cognitive impairment in the setting of advanced dementia (nonverbal, noncommunicative, chronic gastrostomy tube, bed confined with functional quadriplegia, un stage able sacral and RIGHT hip decubiti, heel ulcers, presumed asthma on a recent tapering solumedrol but is normally apparently steroid dependent on 7.5 mg Prednisone daily, past RIGHT hip MRSA infection, apparent systolic LV dysfunction, benzodiazepine dependent (see [facility] # [id number]) and on skeletal muscle relaxants, multiple recent hospitalizations, with the spouse and adult daughters referring spouse following fevers for the past 2 days and coughing at home.  Loose but formed BM and no recent antibiotic use.   Wound consult requested to assist w/ management of sacral & ischial pressure ulcer.  Dakins  packing to sacrum used.  Pt recently seen in wound center. DSD placed awaiting consult. No drainage, odor, redness, warmth, pain, f/c/s noted. Drainage managed by dressing.  feels that wound is improving since initiating tx from [facility]. (+)incontinent (+)sedentary. Offloading & pericare initiated upon admission.   at bedside.  All questions asked and answered to  family's satisfaction.    PAST MEDICAL & SURGICAL HISTORY:  CVA (cerebral vascular accident)  Fatty pancreas  PNA (pneumonia)  Pulmonary HTN  IGT (impaired glucose tolerance)  Ulcerative colitis  Acid reflux  Anxiety  Depression  Mouth sores  HLD (hyperlipidemia)  Asthma  Humeral head fracture  s/p hysterectomy  s/p cataract extraction, left  s/p knee replacement      REVIEW OF SYSTEMS  	  Pt unable to offer      MEDICATIONS  (STANDING):  ALBUTerol    90 MICROgram(s) HFA Inhaler 1 Puff(s) Inhalation every 4 hours  ALBUTerol/ipratropium for Nebulization 3 milliLiter(s) Nebulizer every 6 hours  apixaban 5 milliGRAM(s) Oral every 12 hours  clonazePAM Tablet 1.5 milliGRAM(s) Oral at bedtime  dextrose 5%. 1000 milliLiter(s) (50 mL/Hr) IV Continuous <Continuous>  dextrose 50% Injectable 12.5 Gram(s) IV Push once  dextrose 50% Injectable 25 Gram(s) IV Push once  dextrose 50% Injectable 25 Gram(s) IV Push once  famotidine    Tablet 20 milliGRAM(s) Oral daily  ferrous    sulfate Liquid 300 milliGRAM(s) Oral daily  gabapentin   Solution 300 milliGRAM(s) Oral two times a day  insulin lispro (HumaLOG) corrective regimen sliding scale   SubCutaneous every 6 hours  lisinopril 5 milliGRAM(s) Oral daily  Medical Marijuana Oil 1 milliLiter(s) 1 milliLiter(s) SubLingual <User Schedule>  meropenem  IVPB 1000 milliGRAM(s) IV Intermittent every 8 hours  predniSONE   Tablet 7.5 milliGRAM(s) Oral daily  QUEtiapine 25 milliGRAM(s) Oral at bedtime  sodium chloride 0.9%. 1000 milliLiter(s) (70 mL/Hr) IV Continuous <Continuous>  tiotropium 18 MICROgram(s) Capsule 1 Capsule(s) Inhalation daily  tiZANidine 4 milliGRAM(s) Oral <User Schedule>    MEDICATIONS  (PRN):  acetaminophen    Suspension .. 650 milliGRAM(s) Oral every 6 hours PRN Temp greater or equal to 38C (100.4F)  dextrose 40% Gel 15 Gram(s) Oral once PRN Blood Glucose LESS THAN 70 milliGRAM(s)/deciliter  glucagon  Injectable 1 milliGRAM(s) IntraMuscular once PRN Glucose LESS THAN 70 milligrams/deciliter      Allergies    ASA; dye contrast (Anaphylaxis)  divalproex sodium (Other (Unknown))  Haldol (Other (Unknown))  penicillin (Short breath; Rash)  sulfa drugs (Short breath; Rash)  vancomycin (Rash; Urticaria; Hives)  Xanax (Other (Unknown))      SOCIAL HISTORY:  , Denies smoking, ETOH, drugs    FAMILY HISTORY:  Family history of dementia (Grandparent)  Family history of colon cancer (Grandparent)      Vital Signs Last 24 Hrs  T(C): 38.1 (2019 11:58), Max: 39.7 (2019 00:15)  T(F): 100.5 (2019 11:58), Max: 103.4 (2019 00:15)  HR: 111 (2019 11:58) (104 - 115)  BP: 115/72 (2019 11:58) (115/72 - 152/80)  BP(mean): --  RR: 18 (2019 11:58) (16 - 20)  SpO2: 96% (2019 11:58) (93% - 96%)    NAD / Alert/ answers to simple y/n w/ head nod  cachectic/frail/ Disheveled  Versa Care P500 bed    Cardiovascular: RRR     Respiratory: CTA    Gastrointestinal soft NT/ND (+)BS  (+)PEG     Neurology  nonverbal, no follow commands/ paraplegic    Musculoskeletal/Vascular:   no BLE edema   BLE equally warm  no acute ischemia noted  BUE/BLE w/ contractures    Skin:  dry & frail    Stage 4 Sacral pressure injury  7.5cm 8cm x 2.6cm w/ undermining 7-1 o'clock w/ greatest at 12 of 6cm  necrotic & nonviable tissue  scant moist & granular  (+) serosanguinous drainage    Rt Ischium 5cm x 3.5cm x 0cm  unstageable w/ soft moist nonviable tissue w/o drainage    No odor, erythema, increased warmth, tenderness, induration, fluctuance    LABS:      139  |  99  |  59<H>  ----------------------------<  211<H>  4.5   |  24  |  0.60    Ca    9.3      2019 10:37    TPro  6.9  /  Alb  3.7  /  TBili  0.3  /  DBili  x   /  AST  23  /  ALT  18  /  AlkPhos  92                            11.3   26.2  )-----------( 534      ( 2019 21:23 )             33.8     PT/INR - ( 2019 21:23 )   PT: 18.6 sec;   INR: 1.59 ratio    PTT - ( 2019 21:23 )  PTT:44.0 sec    Urinalysis Basic - ( 2019 21:59 )    Color: Yellow / Appearance: Clear / S.023 / pH: x  Gluc: x / Ketone: Negative  / Bili: Negative / Urobili: Negative   Blood: x / Protein: 30 mg/dL / Nitrite: Negative   Leuk Esterase: Large / RBC: 17 /hpf / WBC 27 /HPF   Sq Epi: x / Non Sq Epi: 0 /hpf / Bacteria: Many        RADIOLOGY & ADDITIONAL STUDIES:  < from: Xray Hip 3-4 Views, Bilateral (19 @ 23:13) >  FINDINGS:  Evaluation is limited secondary to dense material overlying the bilateral   femurs. No definite cortical erosion or periosteal reaction is   identified. The patient is status post right femoral ORIF and right hip   arthroplasty. There is mild hip arthrosis. No acute fracture is   identified.    IMPRESSION:  Limited exam. No gross radiographic evidence of osteomyelitis.    < from: CT Abdomen and Pelvis No Cont (12..18 @ 15:14) >  FINDINGS:    LOWER CHEST: Cardiomegaly.    LIVER: Within normal limits.  BILE DUCTS: Normal caliber.  GALLBLADDER: Cholecystectomy.  SPLEEN: Within normal limits.  PANCREAS: Within normal limits.  ADRENALS: Within normal limits.  KIDNEYS/URETERS: Within normal limits.    BLADDER: Collapsed with a Blackman catheter.  REPRODUCTIVE ORGANS: Limited evaluation secondary to extensive streak   artifact from right lower extremity hardware    BOWEL: Gastrostomy balloon within the stomach. No bowel obstruction.   Appendix is not visualized.  PERITONEUM: No ascites.  VESSELS:  Atheromatous disease of the abdominal aorta.  RETROPERITONEUM: No lymphadenopathy.    ABDOMINAL WALL: Anasarca.  BONES: Degenerative changes of the spine. Complete vertebral body height   loss of L2. Total right hip arthroplasty. Chronic bilateral rib fractures   and left acetabular fracture. Mild anterolisthesis of L4 over L5,   unchanged compared to prior exam of 2018.    IMPRESSION:    No acute intra-abdominal pathology or collection.        Cultures:    Ova and Parasites (19 @ 05:08)    Culture Results:   Testing in progress    GI PCR Panel, Stool (19 @ 03:27)    Culture Results:   GI PCR Results: NOT detected  *******Please Note:*******  GI panel PCR evaluates for:  Campylobacter, Plesiomonas shigelloides, Salmonella,  Vibrio, Yersinia enterocolitica, Enteroaggregative  Escherichia coli (EAEC), Enteropathogenic E.coli (EPEC),  Enterotoxigenic E. coli (ETEC) lt/st, Shiga-like  toxin-producing E. coli (STEC) stx1/stx2,  Shigella/ Enteroinvasive E. coli (EIEC), Cryptosporidium,  Cyclospora cayetanensis, Entamoeba histolytica,  Giardia lamblia, Adenovirus F 40/41, Astrovirus,  Norovirus GI/GII, Rotavirus A, Sapovirus

## 2019-02-27 NOTE — CONSULT NOTE ADULT - ASSESSMENT
67 yo female with multiple medical conditions admitted 2/26 with fever to 103.  She has spent the [facility] of past 9 months in the hospital.  She had difficult to control MRSA bacteremia, finally controlled after RT hip was debrided and explanted.  She has had treatment for pneumonia on  more than 1 occasion.  She has stage 4 decubitus ,peg, and chronic gavin.  No obvious primary site for infection although  tract and sacral wound are possible sources.  I believe she could be challenged with dapto or vanco if necessary, I would prefer to avoid linezolid due to drug interactions.  She also has a chronic DVT and had unexplained fevers x 6 weeks during last hospital stay, with negative CT of C/A/P and negative cultures, rheumatology evaluation was also without an explanation for fever.  Suggest:  1.await blood and urine cultures  2.Will cover with meropenem pending above. I would like additional anaerobic coverage  3.CDT  4.Follow exam and wbc count, temps  5.wound care evaluation  6.If MRSA grows will start daptomycin  7.ID issues reviewed with  at bedside

## 2019-02-27 NOTE — CONSULT NOTE ADULT - ASSESSMENT
A/P: 67 yo female with multiple medical conditions admitted 2/26 with fever to 103.    Sacral stage 4 pressure injury- dakins packng  Rt Ischium unstageable pressure injury- comfeel  Consider MRI, CT  BLE elevation  Abx per Medicine/ ID  Moisturize intact skin w/ SWEEN cream BID  con't Nutrition (as tolerated), Nutrition Consult  con't Offloading   con't Pericare  Care as per medicine will follow w/ you  Upon discharge f/u as outpatient at [facility] [address] [place] [address] [phone]  Seen w/ attng and D/w team  Thank you for this consult  Mona Quick PA-C [id number]

## 2019-02-27 NOTE — PROGRESS NOTE ADULT - SUBJECTIVE AND OBJECTIVE BOX
---___---___---___---___---___---___ ---___---___---___---___---___---___---___---___---___---                    <<<  M E D I C A L   A T T E N D I N G    F O L L O W    U P   N O T E  >>>    here with fever now and elevated wbc cultures pending rvp and other test ordered      ---___---___---___---___---___---      <<<  MEDICATIONS:  >>>    MEDICATIONS  (STANDING):  ALBUTerol    90 MICROgram(s) HFA Inhaler 1 Puff(s) Inhalation every 4 hours  ALBUTerol/ipratropium for Nebulization 3 milliLiter(s) Nebulizer every 6 hours  apixaban 5 milliGRAM(s) Oral every 12 hours  clonazePAM Tablet 1.5 milliGRAM(s) Oral at bedtime  Dakins Solution - 1/4 Strength 1 Application(s) Topical two times a day  dextrose 5%. 1000 milliLiter(s) (50 mL/Hr) IV Continuous <Continuous>  dextrose 50% Injectable 12.5 Gram(s) IV Push once  dextrose 50% Injectable 25 Gram(s) IV Push once  dextrose 50% Injectable 25 Gram(s) IV Push once  famotidine    Tablet 20 milliGRAM(s) Oral daily  ferrous    sulfate Liquid 300 milliGRAM(s) Oral daily  gabapentin   Solution 300 milliGRAM(s) Oral two times a day  insulin lispro (HumaLOG) corrective regimen sliding scale   SubCutaneous every 6 hours  lisinopril 5 milliGRAM(s) Oral daily  Medical Marijuana Oil 1 milliLiter(s) 1 milliLiter(s) SubLingual <User Schedule>  meropenem  IVPB 1000 milliGRAM(s) IV Intermittent every 8 hours  predniSONE   Tablet 7.5 milliGRAM(s) Oral daily  QUEtiapine 25 milliGRAM(s) Oral at bedtime  sodium chloride 0.9%. 1000 milliLiter(s) (70 mL/Hr) IV Continuous <Continuous>  tiotropium 18 MICROgram(s) Capsule 1 Capsule(s) Inhalation daily  tiZANidine 4 milliGRAM(s) Oral <User Schedule>      MEDICATIONS  (PRN):  acetaminophen    Suspension .. 650 milliGRAM(s) Oral every 6 hours PRN Temp greater or equal to 38C (100.4F)  dextrose 40% Gel 15 Gram(s) Oral once PRN Blood Glucose LESS THAN 70 milliGRAM(s)/deciliter  glucagon  Injectable 1 milliGRAM(s) IntraMuscular once PRN Glucose LESS THAN 70 milligrams/deciliter       ---___---___---___---___---___---     <<<REVIEW OF SYSTEM: >>>     unable to obtain   ---___---___---___---___---___---          <<<  VITAL SIGNS: >>>    T(F): 98.5 (19 @ 16:01), Max: 103.4 (19 @ 00:15)  HR: 98 (19 @ 16:01) (98 - 115)  BP: 115/67 (19 @ 16:01) (115/67 - 152/80)  RR: 18 (19 @ 16:01) (16 - 20)  SpO2: 94% (19 @ 16:01) (93% - 96%)  Wt(kg): --  CAPILLARY BLOOD GLUCOSE      POCT Blood Glucose.: 104 mg/dL (2019 17:06)    I&O's Summary    2019 07:01  -  2019 17:41  --------------------------------------------------------  IN: 0 mL / OUT: 0 mL / NET: 0 mL         ---___---___---___---___---___---                       PHYSICAL EXAM:    GEN: A&O X 0 , NAD , comfortable  HEENT: NCAT, PERRL, MMM, no scleral icterus, hearing intact  NECK: Supple, No JVD  CVS: S1S2 , regular , No M/R/G appreciated  PULM: CTA B/L,  no W/R/R appreciated  ABD.: soft. non tender, non distended,  bowel sounds present  Extrem: intact pulses , no edema noted  Derm:sacral decubitus noted   PSYCH: normal mood, no depression, not anxious     ---___---___---___---___---___---     <<<  LAB AND IMAGING: >>>                          11.3   26.2  )-----------( 534      ( 2019 21:23 )             33.8               -    139  |  99  |  59<H>  ----------------------------<  211<H>  4.5   |  24  |  0.60    Ca    9.3      2019 10:37    TPro  6.9  /  Alb  3.7  /  TBili  0.3  /  DBili  x   /  AST  23  /  ALT  18  /  AlkPhos  92      PT/INR - ( 2019 21:23 )   PT: 18.6 sec;   INR: 1.59 ratio         PTT - ( 2019 21:23 )  PTT:44.0 sec       Urinalysis Basic - ( 2019 21:59 )    Color: Yellow / Appearance: Clear / S.023 / pH: x  Gluc: x / Ketone: Negative  / Bili: Negative / Urobili: Negative   Blood: x / Protein: 30 mg/dL / Nitrite: Negative   Leuk Esterase: Large / RBC: 17 /hpf / WBC 27 /HPF   Sq Epi: x / Non Sq Epi: 0 /hpf / Bacteria: Many                        [All pertinent / recent available Imaging reports and other labs reviewed]     ---___---___---___---___---___---___ ---___---___---___---___---           <<<  A S S E S S M E N T   A N D   P L A N :  >>>      fever continue iv abx , cbc with diff ordered and viral panel  id following   sepsis start iv flyuids if bnp is notelevated   anxiety  on marijuana for chronci pain and on seroquel and klonopin as well  chf  continue lisinopril   asthma  on prednsone     -GI/DVT Prophylaxis.    --------------------------------------------  Case discussed with   Education given on     >>______________________<<      Deniz Bolden .         phone   [phone]

## 2019-02-27 NOTE — DIETITIAN INITIAL EVALUATION ADULT. - OTHER INFO
unable to obtain  from patient who is nonverbal, non communicative, functional quadraplegia admitted from home with fever. Patient has had multiple hospital visits with poor PO intake leading to PEG placement in 8/2018. Patient  has received  Vital AF with intolerance per daughter  evidenced by diarrhea. Per daughter, patient is not diabetic but is on chronic steroids with metformin at home for glucose control. Patient admitted with diarrhea PTA although prior 2 weeks stool has been pasty per daughter. Patient receives bolus feeds at home Glucerna 1.2 4 cans daily plus protein supplement for skin breakdown. Daughter wishes to "bulk up patient" . Current weight  is 78lbs; recent weight history as follows: 7/2018  105  lbs;  11/2018  105 lbs, ; 12/18  100lbs; and this admission 78lbs indicating severe weight loss.

## 2019-02-27 NOTE — DIETITIAN INITIAL EVALUATION ADULT. - ENERGY NEEDS
-68 F patient with a history of severely impaired functional and cognitive impairment in the setting of advanced dementia (nonverbal, noncommunicative, chronic gastrostomy tube, bed confined with functional quadriplegia, un stage able sacral and RIGHT hip decubiti, heel ulcers,  asthma on a recent tapering solumedrol but is normally apparently steroid dependent on 7.5 mg Prednisone daily, past RIGHT hip MRSA infection, apparent systolic LV dysfunction, multiple recent hospitalizations, with the spouse and adult daughters referring spouse following fevers for the past 2 days and coughing at home.

## 2019-02-27 NOTE — CONSULT NOTE ADULT - SUBJECTIVE AND OBJECTIVE BOX
HPI:   Patient is a 68y female with a past history of ulcerative colitis,CVA,dementia, rt hip fracture leading to a rt hip arthroplasty in 2018, with post op MRSA bacteremia leading to RT hip explant, spacer placement, and prolonged hospital course, home x 5 weeks, now admitted with fever and lethargy x 2 days.  Her post op hospital stays have been lengthy, she has spent most of past 7 months in the hospital.She was treated with 6 weeks of vanco, had been on suppressive minocycline which was stopped about 6 weeks ago with concerns of drug fever.She has a peg, stage 4 sacral decubitus, and a chronic gavin.She receoved a course of zerbaxa during last admission.She has known candidal urine colonization and is s/p elective gavin change a few days ago.She is fed via peg,has had difficulty with enteral feeds secondary to diarrhea.Her mental status has waxed and waned, she has been followed at wound care center.She has had a few low grade fevers over past 5 weeks.She became more lethargic a few days ago, home CXR was negative, dfoley was changed, a temp of 103 prompted  to bring her to hospital.She received cefepime and linezolid in the ER.She had issues with daptomycin with concerns of a rash, also there were concerns of ceftaroline, she completed her MRSA IV course with vancomycin.    REVIEW OF SYSTEMS:  All other review of systems negative (Comprehensive ROS): limited, patient is non verbal    PAST MEDICAL & SURGICAL HISTORY:  CVA (cerebral vascular accident)  Fatty pancreas  PNA (pneumonia)  Pulmonary HTN  IGT (impaired glucose tolerance)  Ulcerative colitis  Acid reflux  Anxiety  Depression  Mouth sores  HLD (hyperlipidemia)  Asthma  Humeral head fracture  H/O: hysterectomy  H/O cataract extraction, left  History of knee replacement      Allergies    ASA; dye contrast (Anaphylaxis)  aspirin (Short breath)  divalproex sodium (Other (Unknown))  Haldol (Other (Unknown))  penicillin (Short breath; Rash)  sulfa drugs (Short breath; Rash)  vancomycin (Rash; Urticaria; Hives)  Xanax (Other (Unknown))    Intolerances        Antimicrobials Day #  :day 2  cefepime   IVPB 2000 milliGRAM(s) IV Intermittent every 12 hours    Other Medications:  acetaminophen    Suspension .. 650 milliGRAM(s) Oral every 6 hours PRN  ALBUTerol    90 MICROgram(s) HFA Inhaler 1 Puff(s) Inhalation every 4 hours  ALBUTerol/ipratropium for Nebulization 3 milliLiter(s) Nebulizer every 6 hours  apixaban 5 milliGRAM(s) Oral every 12 hours  clonazePAM Tablet 1.5 milliGRAM(s) Oral at bedtime  dextrose 40% Gel 15 Gram(s) Oral once PRN  dextrose 5%. 1000 milliLiter(s) IV Continuous <Continuous>  dextrose 50% Injectable 12.5 Gram(s) IV Push once  dextrose 50% Injectable 25 Gram(s) IV Push once  dextrose 50% Injectable 25 Gram(s) IV Push once  famotidine    Tablet 20 milliGRAM(s) Oral daily  ferrous    sulfate Liquid 300 milliGRAM(s) Oral daily  gabapentin   Solution 300 milliGRAM(s) Oral two times a day  glucagon  Injectable 1 milliGRAM(s) IntraMuscular once PRN  insulin lispro (HumaLOG) corrective regimen sliding scale   SubCutaneous every 6 hours  lisinopril 5 milliGRAM(s) Oral daily  Medical Marijuana Oil 1 milliLiter(s) 1 milliLiter(s) SubLingual <User Schedule>  predniSONE   Tablet 7.5 milliGRAM(s) Oral daily  QUEtiapine 25 milliGRAM(s) Oral at bedtime  sodium chloride 0.9%. 1000 milliLiter(s) IV Continuous <Continuous>  tiotropium 18 MICROgram(s) Capsule 1 Capsule(s) Inhalation daily  tiZANidine 4 milliGRAM(s) Oral <User Schedule>      FAMILY HISTORY:  Family history of dementia (Grandparent)  Family history of colon cancer (Grandparent)      SOCIAL HISTORY:  Smoking:  no   ETOH:  no   Drug Use: no         T(F): 102 (19 @ 09:17), Max: 103.4 (19 @ 00:15)  HR: 104 (19 @ 04:58)  BP: 131/77 (19 @ 04:58)  RR: 18 (19 @ 04:58)  SpO2: 93% (19 @ 04:58)  Wt(kg): --    PHYSICAL EXAM:  General: alethargic, no acute distress, not verbal, chronically ill appearing  Eyes:  anicteric, no conjunctival injection, no discharge  Oropharynx: no lesions or injection 	  Neck: supple, without adenopathy  Lungs: clear to auscultation, diminished at bases  Heart: regular rate and rhythm; no murmur, rubs or gallops  Abdomen: soft, nondistended, nontender, peg in place  Skin: nrt hip with superficial breakdown,stage 4 sacral decubitus, necrotic  Extremities: no clubbing, cyanosis, conttracted  Neurologic: lethargic, poorly interactive    LAB RESULTS:                        11.3   26.2  )-----------( 534      ( 2019 21:23 )             33.8         140  |  97  |  64<H>  ----------------------------<  166<H>  4.9   |  24  |  0.53    Ca    10.0      2019 21:23    TPro  6.9  /  Alb  3.7  /  TBili  0.3  /  DBili  x   /  AST  23  /  ALT  18  /  AlkPhos  92      LIVER FUNCTIONS - ( 2019 21:23 )  Alb: 3.7 g/dL / Pro: 6.9 g/dL / ALK PHOS: 92 U/L / ALT: 18 U/L / AST: 23 U/L / GGT: x           Urinalysis Basic - ( 2019 21:59 )    Color: Yellow / Appearance: Clear / S.023 / pH: x  Gluc: x / Ketone: Negative  / Bili: Negative / Urobili: Negative   Blood: x / Protein: 30 mg/dL / Nitrite: Negative   Leuk Esterase: Large / RBC: 17 /hpf / WBC 27 /HPF   Sq Epi: x / Non Sq Epi: 0 /hpf / Bacteria: Many        MICROBIOLOGY:  RECENT CULTURES:   @ 05:08 .Stool Feces     Testing in progress       @ 03:27 .Stool Feces     GI PCR Results: NOT detected  *******Please Note:*******              RADIOLOGY REVIEWED:  < from: Xray Chest 1 View- PORTABLE-Urgent (19 @ 23:13) >  INTERPRETATION:  CLINICAL INDICATION: Fever.    EXAM: Frontal radiograph of the chest.    COMPARISON: Chest radiograph from 2018.    FINDINGS:  The lungs are clear. No pleural effusion or pneumothorax.  Cardiac size cannot be adequately assessed on this projection.  There are no acute osseous abnormalities.      IMPRESSION:   Clear lungs.    < end of copied text >

## 2019-02-27 NOTE — DIETITIAN INITIAL EVALUATION ADULT. - ETIOLOGY
altered GI function, patient unable to tolerate adequate amounts of  oral diet or tube feeding related to persist recurrent diarrhea

## 2019-02-27 NOTE — DIETITIAN INITIAL EVALUATION ADULT. - PERTINENT MEDS FT
sliding scale insulin lispro Q6hrs, prednisone, seroquel,  medical marijuana sliding scale insulin lispro Q6hrs, prednisone, seroquel,  medical marijuana    Note: patient RRT today for SBP 60's. Discussed changing to continuous rate with  who insists patient will have diarrhea. Team decision to   continue bolus feeds for now

## 2019-02-27 NOTE — PHARMACOTHERAPY INTERVENTION NOTE - COMMENTS
reviewed dosing regimen of medical marijuana w/ spouse. provided policy and procedure of medical marijuana administration to RN

## 2019-02-27 NOTE — CONSULT NOTE ADULT - ATTENDING COMMENTS
69 yo female , contracted , non verbal ,cachectic,  is reported to have developed a sacral wound following a stay in [facility]  has been in office 2x , where wound has shown modest improvement following sharp debridement and Dakins  Currently is febrile with no clear etiology  Wound is stable with a small amount of bone present in wound, c/w osteomyelitis  Status d/w  at bedside  Rem,ain available 69 yo female , contracted , non verbal ,cachectic,  is reported to have developed a sacral wound following a stay in [facility]  has been in office 2x , where wound has shown modest improvement following sharp debridement and Dakins  Currently is febrile with no clear etiology  Wound is stable with a small amount of bone present in wound, c/w osteomyelitis  Status d/w  at bedside  Remain available

## 2019-02-27 NOTE — DIETITIAN INITIAL EVALUATION ADULT. - NS AS NUTRI DX NUTRIENT
Malnutrition/acute on chronic severe malnutrition;, NFPE visually assessed, observed st III R hip wound, severe generalized muscle wasting with fat depletion

## 2019-02-27 NOTE — DIETITIAN INITIAL EVALUATION ADULT. - NS FNS REASON FOR WEIGHT CHANG
altered GI function (specify)/poor diet tolerance, h/o inadequate PO intake leading to PEG placement

## 2019-02-27 NOTE — CHART NOTE - NSCHARTNOTEFT_GEN_A_CORE
called by RN to inform pt with fever 102; pt received cefepime and zyvox in ER   Patient is a 68y old  Female who presents with a chief complaint of Fevers for the past 2 days at home (26 Feb 2019 23:06)        Vital Signs Last 24 Hrs  T(C): 38.9 (27 Feb 2019 09:17), Max: 39.7 (27 Feb 2019 00:15)  T(F): 102 (27 Feb 2019 09:17), Max: 103.4 (27 Feb 2019 00:15)  HR: 104 (27 Feb 2019 04:58) (104 - 115)  BP: 131/77 (27 Feb 2019 04:58) (131/77 - 152/80)  BP(mean): --  RR: 18 (27 Feb 2019 04:58) (16 - 20)  SpO2: 93% (27 Feb 2019 04:58) (93% - 94%)                        11.3   26.2  )-----------( 534      ( 26 Feb 2019 21:23 )             33.8     02-26    140  |  97  |  64<H>  ----------------------------<  166<H>  4.9   |  24  |  0.53    Ca    10.0      26 Feb 2019 21:23    TPro  6.9  /  Alb  3.7  /  TBili  0.3  /  DBili  x   /  AST  23  /  ALT  18  /  AlkPhos  92  02-26    PT/INR - ( 26 Feb 2019 21:23 )   PT: 18.6 sec;   INR: 1.59 ratio         PTT - ( 26 Feb 2019 21:23 )  PTT:44.0 sec      A/P called by RN to inform pt with fever 102; pt received cefepime and zyvox in ER ; per attending Md to continue cefepime; urine and blood cx result pending; GI pcr negative; cxr-clear, RVP negative  no diarrhea; poor verbal output, lethargic.  Patient is a 68y old  Female who presents with a chief complaint of Fevers for the past 2 days at home (26 Feb 2019 23:06)  HPI  Patient is a 68y female with a past history of ulcerative colitis,CVA,dementia, rt hip fracture leading to a rt hip arthroplasty in June of 2018, with post op MRSA bacteremia leading to RT hip explant, spacer placement, and prolonged hospital course, home x 5 weeks, now admitted with fever and lethargy x 2 days.  Her post op hospital stays have been lengthy, she has spent most of past 7 months in the hospital.She was treated with 6 weeks of vanco, had been on suppressive minocycline which was stopped about 6 weeks ago with concerns of drug fever.She has a peg, stage 4 sacral decubitus, and a chronic gavin. She received a course of zerbaxa during last admission.She has known candidal urine colonization and is s/p elective gavin change a few days ago.She is fed via peg,has had difficulty with enteral feeds secondary to diarrhea.Her mental status has waxed and waned, she has been followed at wound care center.She has had a few low grade fevers over past 5 weeks.She became more lethargic a few days ago, home CXR was negative, dfoley was changed, a temp of 103 prompted  to bring her to hospital.She received cefepime and linezolid in the ER.She had issues with daptomycin with concerns of a rash, also there were concerns of ceftaroline, she completed her MRSA IV course with vancomycin.          Vital Signs Last 24 Hrs  T(C): 38.9 (27 Feb 2019 09:17), Max: 39.7 (27 Feb 2019 00:15)  T(F): 102 (27 Feb 2019 09:17), Max: 103.4 (27 Feb 2019 00:15)  HR: 104 (27 Feb 2019 04:58) (104 - 115)  BP: 131/77 (27 Feb 2019 04:58) (131/77 - 152/80)  BP(mean): --  RR: 18 (27 Feb 2019 04:58) (16 - 20)  SpO2: 93% (27 Feb 2019 04:58) (93% - 94%)        PHYSICAL EXAM:  General: lethargic, no acute distress, not verbal, chronically ill appearing  Lungs: clear to auscultation, poor effort  Heart: regular rate and rhythm; no murmur,  Abdomen: soft, nondistended, nontender, peg in place  Skin: right ischial area  with superficial breakdown, stage 4 sacral decubitus with necrotic area/slough  Extremities: , contracted                              11.3   26.2  )-----------( 534      ( 26 Feb 2019 21:23 )             33.8     02-26    140  |  97  |  64<H>  ----------------------------<  166<H>  4.9   |  24  |  0.53    Ca    10.0      26 Feb 2019 21:23    TPro  6.9  /  Alb  3.7  /  TBili  0.3  /  DBili  x   /  AST  23  /  ALT  18  /  AlkPhos  92  02-26    PT/INR - ( 26 Feb 2019 21:23 )   PT: 18.6 sec;   INR: 1.59 ratio         PTT - ( 26 Feb 2019 21:23 )  PTT:44.0 sec      A/P  67 yo female with multiple medical conditions as stated above admitted on 2/26 with fever  103.   pt is now  with fever 102; pt received cefepime and zyvox in ER ; per attending Md to continue cefepime; urine and blood cx result pending; GI pcr negative; cxr-clear, RVP negative  d/w ID for follow up on antibiotic; continue tylenol prn for fever as prescribed.  continue IVF; follow up lab.  wound care follow up.  updated pt  of plan of care

## 2019-02-27 NOTE — PROVIDER CONTACT NOTE (MEDICATION) - ACTION/TREATMENT ORDERED:
BRET Medina made aware.  placed cold packs on pt, awaiting for IV tylenol and will administer and awaiting for cooling blanket.  will continue to monitor.

## 2019-02-27 NOTE — DIETITIAN INITIAL EVALUATION ADULT. - FACTORS AFF FOOD INTAKE
dementia with dysphagia dependent on  PEG feeding dementia with dysphagia dependent on  PEG feeding/change in mental status/difficulty feeding self/difficulty chewing/difficulty swallowing

## 2019-02-27 NOTE — DIETITIAN INITIAL EVALUATION ADULT. - NS AS NUTRI INTERV ENTERAL NUTRITION
Volume/Schedule/Composition/Concentration/Rate/Route/recommend resume home regime ( family opposed to changing formula).  Glucerna 1.2  4 cans  daily via PEG, add prosource x1, add banatrol for diarrhea

## 2019-02-28 ENCOUNTER — APPOINTMENT (OUTPATIENT)
Dept: WOUND CARE | Facility: CLINIC | Age: 69
End: 2019-02-28

## 2019-02-28 LAB
-  COAGULASE NEGATIVE STAPHYLOCOCCUS: SIGNIFICANT CHANGE UP
ANION GAP SERPL CALC-SCNC: 16 MMOL/L — SIGNIFICANT CHANGE UP (ref 5–17)
APPEARANCE UR: ABNORMAL
BACTERIA # UR AUTO: ABNORMAL
BASOPHILS # BLD AUTO: 0.05 K/UL — SIGNIFICANT CHANGE UP (ref 0–0.2)
BASOPHILS # BLD AUTO: 0.08 K/UL — SIGNIFICANT CHANGE UP (ref 0–0.2)
BASOPHILS NFR BLD AUTO: 0.1 % — SIGNIFICANT CHANGE UP (ref 0–2)
BASOPHILS NFR BLD AUTO: 0.2 % — SIGNIFICANT CHANGE UP (ref 0–2)
BILIRUB UR-MCNC: NEGATIVE — SIGNIFICANT CHANGE UP
BUN SERPL-MCNC: 74 MG/DL — HIGH (ref 7–23)
CALCIUM SERPL-MCNC: 8.2 MG/DL — LOW (ref 8.4–10.5)
CHLORIDE SERPL-SCNC: 109 MMOL/L — HIGH (ref 96–108)
CO2 SERPL-SCNC: 17 MMOL/L — LOW (ref 22–31)
COLOR SPEC: YELLOW — SIGNIFICANT CHANGE UP
COMMENT - URINE: SIGNIFICANT CHANGE UP
CREAT SERPL-MCNC: 1.04 MG/DL — SIGNIFICANT CHANGE UP (ref 0.5–1.3)
DIFF PNL FLD: ABNORMAL
EOSINOPHIL # BLD AUTO: 0.17 K/UL — SIGNIFICANT CHANGE UP (ref 0–0.5)
EOSINOPHIL # BLD AUTO: 1.16 K/UL — HIGH (ref 0–0.5)
EOSINOPHIL NFR BLD AUTO: 0.5 % — SIGNIFICANT CHANGE UP (ref 0–6)
EOSINOPHIL NFR BLD AUTO: 3.5 % — SIGNIFICANT CHANGE UP (ref 0–6)
EPI CELLS # UR: 5 — SIGNIFICANT CHANGE UP
GAS PNL BLDA: SIGNIFICANT CHANGE UP
GLUCOSE BLDC GLUCOMTR-MCNC: 109 MG/DL — HIGH (ref 70–99)
GLUCOSE BLDC GLUCOMTR-MCNC: 139 MG/DL — HIGH (ref 70–99)
GLUCOSE BLDC GLUCOMTR-MCNC: 180 MG/DL — HIGH (ref 70–99)
GLUCOSE BLDC GLUCOMTR-MCNC: 51 MG/DL — LOW (ref 70–99)
GLUCOSE BLDC GLUCOMTR-MCNC: 56 MG/DL — LOW (ref 70–99)
GLUCOSE BLDC GLUCOMTR-MCNC: 57 MG/DL — LOW (ref 70–99)
GLUCOSE BLDC GLUCOMTR-MCNC: 69 MG/DL — LOW (ref 70–99)
GLUCOSE BLDC GLUCOMTR-MCNC: 73 MG/DL — SIGNIFICANT CHANGE UP (ref 70–99)
GLUCOSE BLDC GLUCOMTR-MCNC: 79 MG/DL — SIGNIFICANT CHANGE UP (ref 70–99)
GLUCOSE BLDC GLUCOMTR-MCNC: 90 MG/DL — SIGNIFICANT CHANGE UP (ref 70–99)
GLUCOSE SERPL-MCNC: 203 MG/DL — HIGH (ref 70–99)
GLUCOSE UR QL: NEGATIVE — SIGNIFICANT CHANGE UP
GRAM STN FLD: SIGNIFICANT CHANGE UP
HCT VFR BLD CALC: 29.6 % — LOW (ref 34.5–45)
HGB BLD-MCNC: 8.8 G/DL — LOW (ref 11.5–15.5)
HYALINE CASTS # UR AUTO: 4 /LPF — SIGNIFICANT CHANGE UP (ref 0–7)
IMM GRANULOCYTES NFR BLD AUTO: 1.3 % — SIGNIFICANT CHANGE UP (ref 0–1.5)
IMM GRANULOCYTES NFR BLD AUTO: 1.5 % — SIGNIFICANT CHANGE UP (ref 0–1.5)
KETONES UR-MCNC: SIGNIFICANT CHANGE UP
LACTATE SERPL-SCNC: 1.1 MMOL/L — SIGNIFICANT CHANGE UP (ref 0.7–2)
LEUKOCYTE ESTERASE UR-ACNC: ABNORMAL
LYMPHOCYTES # BLD AUTO: 0.4 K/UL — LOW (ref 1–3.3)
LYMPHOCYTES # BLD AUTO: 0.49 K/UL — LOW (ref 1–3.3)
LYMPHOCYTES # BLD AUTO: 1.2 % — LOW (ref 13–44)
LYMPHOCYTES # BLD AUTO: 1.4 % — LOW (ref 13–44)
MANUAL SMEAR VERIFICATION: SIGNIFICANT CHANGE UP
MCHC RBC-ENTMCNC: 29.2 PG — SIGNIFICANT CHANGE UP (ref 27–34)
MCHC RBC-ENTMCNC: 29.7 GM/DL — LOW (ref 32–36)
MCV RBC AUTO: 98.3 FL — SIGNIFICANT CHANGE UP (ref 80–100)
METHOD TYPE: SIGNIFICANT CHANGE UP
MONOCYTES # BLD AUTO: 0.71 K/UL — SIGNIFICANT CHANGE UP (ref 0–0.9)
MONOCYTES # BLD AUTO: 1.39 K/UL — HIGH (ref 0–0.9)
MONOCYTES NFR BLD AUTO: 2 % — SIGNIFICANT CHANGE UP (ref 2–14)
MONOCYTES NFR BLD AUTO: 4.2 % — SIGNIFICANT CHANGE UP (ref 2–14)
NEUTROPHILS # BLD AUTO: 29.96 K/UL — HIGH (ref 1.8–7.4)
NEUTROPHILS # BLD AUTO: 33.98 K/UL — HIGH (ref 1.8–7.4)
NEUTROPHILS NFR BLD AUTO: 89.5 % — HIGH (ref 43–77)
NEUTROPHILS NFR BLD AUTO: 94.6 % — HIGH (ref 43–77)
NITRITE UR-MCNC: NEGATIVE — SIGNIFICANT CHANGE UP
PH UR: 6 — SIGNIFICANT CHANGE UP (ref 5–8)
PLAT MORPH BLD: NORMAL — SIGNIFICANT CHANGE UP
PLATELET # BLD AUTO: 412 K/UL — HIGH (ref 150–400)
POTASSIUM SERPL-MCNC: 3.6 MMOL/L — SIGNIFICANT CHANGE UP (ref 3.5–5.3)
POTASSIUM SERPL-SCNC: 3.6 MMOL/L — SIGNIFICANT CHANGE UP (ref 3.5–5.3)
PROT UR-MCNC: 100 — SIGNIFICANT CHANGE UP
RBC # BLD: 3.01 M/UL — LOW (ref 3.8–5.2)
RBC # FLD: 14.3 % — SIGNIFICANT CHANGE UP (ref 10.3–14.5)
RBC BLD AUTO: NORMAL — SIGNIFICANT CHANGE UP
RBC CASTS # UR COMP ASSIST: 6 /HPF — HIGH (ref 0–4)
SODIUM SERPL-SCNC: 142 MMOL/L — SIGNIFICANT CHANGE UP (ref 135–145)
SP GR SPEC: 1.02 — SIGNIFICANT CHANGE UP (ref 1.01–1.02)
SPECIMEN SOURCE: SIGNIFICANT CHANGE UP
URATE CRY FLD QL MICRO: ABNORMAL
UROBILINOGEN FLD QL: ABNORMAL
WBC # BLD: 33.47 K/UL — HIGH (ref 3.8–10.5)
WBC # FLD AUTO: 33.47 K/UL — HIGH (ref 3.8–10.5)
WBC UR QL: 55 /HPF — HIGH (ref 0–5)

## 2019-02-28 PROCEDURE — 93010 ELECTROCARDIOGRAM REPORT: CPT

## 2019-02-28 PROCEDURE — 99291 CRITICAL CARE FIRST HOUR: CPT

## 2019-02-28 RX ORDER — APIXABAN 2.5 MG/1
5 TABLET, FILM COATED ORAL EVERY 12 HOURS
Qty: 0 | Refills: 0 | Status: DISCONTINUED | OUTPATIENT
Start: 2019-02-28 | End: 2019-03-18

## 2019-02-28 RX ORDER — NOREPINEPHRINE BITARTRATE/D5W 8 MG/250ML
0.05 PLASTIC BAG, INJECTION (ML) INTRAVENOUS
Qty: 8 | Refills: 0 | Status: DISCONTINUED | OUTPATIENT
Start: 2019-02-28 | End: 2019-02-28

## 2019-02-28 RX ORDER — MICAFUNGIN SODIUM 100 MG/1
100 INJECTION, POWDER, LYOPHILIZED, FOR SOLUTION INTRAVENOUS EVERY 24 HOURS
Qty: 0 | Refills: 0 | Status: DISCONTINUED | OUTPATIENT
Start: 2019-02-28 | End: 2019-02-28

## 2019-02-28 RX ORDER — QUETIAPINE FUMARATE 200 MG/1
25 TABLET, FILM COATED ORAL AT BEDTIME
Qty: 0 | Refills: 0 | Status: DISCONTINUED | OUTPATIENT
Start: 2019-02-28 | End: 2019-03-18

## 2019-02-28 RX ORDER — DAPTOMYCIN 500 MG/10ML
210 INJECTION, POWDER, LYOPHILIZED, FOR SOLUTION INTRAVENOUS
Qty: 0 | Refills: 0 | Status: DISCONTINUED | OUTPATIENT
Start: 2019-02-28 | End: 2019-02-28

## 2019-02-28 RX ORDER — SODIUM HYPOCHLORITE 0.125 %
1 SOLUTION, NON-ORAL MISCELLANEOUS
Qty: 0 | Refills: 0 | Status: DISCONTINUED | OUTPATIENT
Start: 2019-02-28 | End: 2019-03-18

## 2019-02-28 RX ORDER — SODIUM CHLORIDE 9 MG/ML
1000 INJECTION INTRAMUSCULAR; INTRAVENOUS; SUBCUTANEOUS
Qty: 0 | Refills: 0 | Status: DISCONTINUED | OUTPATIENT
Start: 2019-02-28 | End: 2019-03-01

## 2019-02-28 RX ORDER — MICAFUNGIN SODIUM 100 MG/1
100 INJECTION, POWDER, LYOPHILIZED, FOR SOLUTION INTRAVENOUS EVERY 24 HOURS
Qty: 0 | Refills: 0 | Status: DISCONTINUED | OUTPATIENT
Start: 2019-02-28 | End: 2019-03-01

## 2019-02-28 RX ORDER — FAMOTIDINE 10 MG/ML
20 INJECTION INTRAVENOUS DAILY
Qty: 0 | Refills: 0 | Status: DISCONTINUED | OUTPATIENT
Start: 2019-02-28 | End: 2019-03-18

## 2019-02-28 RX ORDER — ALBUTEROL 90 UG/1
1 AEROSOL, METERED ORAL EVERY 4 HOURS
Qty: 0 | Refills: 0 | Status: DISCONTINUED | OUTPATIENT
Start: 2019-02-28 | End: 2019-03-18

## 2019-02-28 RX ORDER — ACETAMINOPHEN 500 MG
650 TABLET ORAL EVERY 6 HOURS
Qty: 0 | Refills: 0 | Status: DISCONTINUED | OUTPATIENT
Start: 2019-02-28 | End: 2019-03-18

## 2019-02-28 RX ORDER — DIPHENHYDRAMINE HCL 50 MG
50 CAPSULE ORAL ONCE
Qty: 0 | Refills: 0 | Status: DISCONTINUED | OUTPATIENT
Start: 2019-02-28 | End: 2019-02-28

## 2019-02-28 RX ORDER — TIOTROPIUM BROMIDE 18 UG/1
1 CAPSULE ORAL; RESPIRATORY (INHALATION) DAILY
Qty: 0 | Refills: 0 | Status: DISCONTINUED | OUTPATIENT
Start: 2019-02-28 | End: 2019-03-18

## 2019-02-28 RX ORDER — MEROPENEM 1 G/30ML
1000 INJECTION INTRAVENOUS EVERY 8 HOURS
Qty: 0 | Refills: 0 | Status: DISCONTINUED | OUTPATIENT
Start: 2019-02-28 | End: 2019-03-01

## 2019-02-28 RX ORDER — DIPHENHYDRAMINE HCL 50 MG
25 CAPSULE ORAL ONCE
Qty: 0 | Refills: 0 | Status: COMPLETED | OUTPATIENT
Start: 2019-02-28 | End: 2019-02-28

## 2019-02-28 RX ORDER — INSULIN LISPRO 100/ML
VIAL (ML) SUBCUTANEOUS AT BEDTIME
Qty: 0 | Refills: 0 | Status: DISCONTINUED | OUTPATIENT
Start: 2019-02-28 | End: 2019-03-02

## 2019-02-28 RX ORDER — CLONAZEPAM 1 MG
1.5 TABLET ORAL AT BEDTIME
Qty: 0 | Refills: 0 | Status: DISCONTINUED | OUTPATIENT
Start: 2019-02-28 | End: 2019-03-07

## 2019-02-28 RX ORDER — INSULIN LISPRO 100/ML
VIAL (ML) SUBCUTANEOUS EVERY 6 HOURS
Qty: 0 | Refills: 0 | Status: DISCONTINUED | OUTPATIENT
Start: 2019-02-28 | End: 2019-02-28

## 2019-02-28 RX ORDER — INSULIN LISPRO 100/ML
VIAL (ML) SUBCUTANEOUS
Qty: 0 | Refills: 0 | Status: DISCONTINUED | OUTPATIENT
Start: 2019-02-28 | End: 2019-03-02

## 2019-02-28 RX ORDER — SODIUM CHLORIDE 9 MG/ML
1000 INJECTION INTRAMUSCULAR; INTRAVENOUS; SUBCUTANEOUS
Qty: 0 | Refills: 0 | Status: DISCONTINUED | OUTPATIENT
Start: 2019-02-28 | End: 2019-02-28

## 2019-02-28 RX ORDER — GABAPENTIN 400 MG/1
300 CAPSULE ORAL
Qty: 0 | Refills: 0 | Status: DISCONTINUED | OUTPATIENT
Start: 2019-02-28 | End: 2019-03-18

## 2019-02-28 RX ORDER — PHENYLEPHRINE HYDROCHLORIDE 10 MG/ML
1 INJECTION INTRAVENOUS
Qty: 40 | Refills: 0 | Status: DISCONTINUED | OUTPATIENT
Start: 2019-02-28 | End: 2019-03-02

## 2019-02-28 RX ORDER — FERROUS SULFATE 325(65) MG
300 TABLET ORAL DAILY
Qty: 0 | Refills: 0 | Status: DISCONTINUED | OUTPATIENT
Start: 2019-02-28 | End: 2019-03-18

## 2019-02-28 RX ORDER — DAPTOMYCIN 500 MG/10ML
210 INJECTION, POWDER, LYOPHILIZED, FOR SOLUTION INTRAVENOUS
Qty: 0 | Refills: 0 | Status: DISCONTINUED | OUTPATIENT
Start: 2019-02-28 | End: 2019-03-01

## 2019-02-28 RX ORDER — MIDODRINE HYDROCHLORIDE 2.5 MG/1
10 TABLET ORAL THREE TIMES A DAY
Qty: 0 | Refills: 0 | Status: DISCONTINUED | OUTPATIENT
Start: 2019-02-28 | End: 2019-03-07

## 2019-02-28 RX ORDER — ACETAMINOPHEN 500 MG
650 TABLET ORAL ONCE
Qty: 0 | Refills: 0 | Status: COMPLETED | OUTPATIENT
Start: 2019-02-28 | End: 2019-02-28

## 2019-02-28 RX ADMIN — Medication 3 MILLILITER(S): at 13:07

## 2019-02-28 RX ADMIN — FAMOTIDINE 20 MILLIGRAM(S): 10 INJECTION INTRAVENOUS at 13:07

## 2019-02-28 RX ADMIN — QUETIAPINE FUMARATE 25 MILLIGRAM(S): 200 TABLET, FILM COATED ORAL at 22:01

## 2019-02-28 RX ADMIN — MEROPENEM 100 MILLIGRAM(S): 1 INJECTION INTRAVENOUS at 22:00

## 2019-02-28 RX ADMIN — APIXABAN 5 MILLIGRAM(S): 2.5 TABLET, FILM COATED ORAL at 17:20

## 2019-02-28 RX ADMIN — GABAPENTIN 300 MILLIGRAM(S): 400 CAPSULE ORAL at 07:11

## 2019-02-28 RX ADMIN — Medication 1 APPLICATION(S): at 17:21

## 2019-02-28 RX ADMIN — PHENYLEPHRINE HYDROCHLORIDE 13.43 MICROGRAM(S)/KG/MIN: 10 INJECTION INTRAVENOUS at 22:03

## 2019-02-28 RX ADMIN — Medication 300 MILLIGRAM(S): at 17:22

## 2019-02-28 RX ADMIN — Medication 1 APPLICATION(S): at 07:12

## 2019-02-28 RX ADMIN — Medication 500 MILLIGRAM(S): at 00:16

## 2019-02-28 RX ADMIN — MEROPENEM 100 MILLIGRAM(S): 1 INJECTION INTRAVENOUS at 07:12

## 2019-02-28 RX ADMIN — Medication 650 MILLIGRAM(S): at 23:42

## 2019-02-28 RX ADMIN — Medication 650 MILLIGRAM(S): at 03:06

## 2019-02-28 RX ADMIN — APIXABAN 5 MILLIGRAM(S): 2.5 TABLET, FILM COATED ORAL at 07:12

## 2019-02-28 RX ADMIN — Medication 7.5 MILLIGRAM(S): at 07:12

## 2019-02-28 RX ADMIN — GABAPENTIN 300 MILLIGRAM(S): 400 CAPSULE ORAL at 17:21

## 2019-02-28 RX ADMIN — Medication 1.5 MILLIGRAM(S): at 22:00

## 2019-02-28 RX ADMIN — Medication 25 MILLIGRAM(S): at 23:42

## 2019-02-28 RX ADMIN — MEROPENEM 100 MILLIGRAM(S): 1 INJECTION INTRAVENOUS at 13:31

## 2019-02-28 RX ADMIN — Medication 3.36 MICROGRAM(S)/KG/MIN: at 17:21

## 2019-02-28 RX ADMIN — MICAFUNGIN SODIUM 105 MILLIGRAM(S): 100 INJECTION, POWDER, LYOPHILIZED, FOR SOLUTION INTRAVENOUS at 17:19

## 2019-02-28 RX ADMIN — Medication 3 MILLILITER(S): at 02:06

## 2019-02-28 RX ADMIN — TIZANIDINE 4 MILLIGRAM(S): 4 TABLET ORAL at 09:05

## 2019-02-28 RX ADMIN — Medication 3 MILLILITER(S): at 07:11

## 2019-02-28 RX ADMIN — MIDODRINE HYDROCHLORIDE 10 MILLIGRAM(S): 2.5 TABLET ORAL at 22:21

## 2019-02-28 RX ADMIN — DAPTOMYCIN 108.4 MILLIGRAM(S): 500 INJECTION, POWDER, LYOPHILIZED, FOR SOLUTION INTRAVENOUS at 15:30

## 2019-02-28 RX ADMIN — Medication 300 MILLIGRAM(S): at 13:07

## 2019-02-28 NOTE — PROGRESS NOTE ADULT - SUBJECTIVE AND OBJECTIVE BOX
---___---___---___---___---___---___ ---___---___---___---___---___---___---___---___---___---                    <<<  M E D I C A L   A T T E N D I N G    F O L L O W    U P   N O T E  >>>    still lethargic      ---___---___---___---___---___---      <<<  MEDICATIONS:  >>>    MEDICATIONS  (STANDING):  ALBUTerol    90 MICROgram(s) HFA Inhaler 1 Puff(s) Inhalation every 4 hours  ALBUTerol/ipratropium for Nebulization 3 milliLiter(s) Nebulizer every 6 hours  apixaban 5 milliGRAM(s) Oral every 12 hours  clonazePAM Tablet 1.5 milliGRAM(s) Oral at bedtime  Dakins Solution - 1/4 Strength 1 Application(s) Topical two times a day  dextrose 5%. 1000 milliLiter(s) (50 mL/Hr) IV Continuous <Continuous>  dextrose 50% Injectable 12.5 Gram(s) IV Push once  dextrose 50% Injectable 25 Gram(s) IV Push once  dextrose 50% Injectable 25 Gram(s) IV Push once  famotidine    Tablet 20 milliGRAM(s) Oral daily  ferrous    sulfate Liquid 300 milliGRAM(s) Oral daily  gabapentin   Solution 300 milliGRAM(s) Oral two times a day  insulin lispro (HumaLOG) corrective regimen sliding scale   SubCutaneous every 6 hours  Medical Marijuana Oil 1 milliLiter(s) 1 milliLiter(s) SubLingual <User Schedule>  meropenem  IVPB 1000 milliGRAM(s) IV Intermittent every 8 hours  predniSONE   Tablet 7.5 milliGRAM(s) Oral daily  QUEtiapine 25 milliGRAM(s) Oral at bedtime  sodium chloride 0.9%. 1000 milliLiter(s) (70 mL/Hr) IV Continuous <Continuous>  tiotropium 18 MICROgram(s) Capsule 1 Capsule(s) Inhalation daily  tiZANidine 4 milliGRAM(s) Oral <User Schedule>      MEDICATIONS  (PRN):  acetaminophen    Suspension .. 650 milliGRAM(s) Oral every 6 hours PRN Temp greater or equal to 38C (100.4F)  dextrose 40% Gel 15 Gram(s) Oral once PRN Blood Glucose LESS THAN 70 milliGRAM(s)/deciliter  glucagon  Injectable 1 milliGRAM(s) IntraMuscular once PRN Glucose LESS THAN 70 milligrams/deciliter       ---___---___---___---___---___---     <<<REVIEW OF SYSTEM: >>>    unable to obtain      ---___---___---___---___---___---          <<<  VITAL SIGNS: >>>    T(F): 100 (19 @ 11:55), Max: 102.8 (19 @ 03:01)  HR: 112 (19 @ 07:14) (98 - 112)  BP: 90/63 (19 @ 07:14) (90/63 - 115/67)  RR: 18 (19 @ 07:14) (18 - 18)  SpO2: 95% (19 @ 07:14) (94% - 95%)  Wt(kg): --  CAPILLARY BLOOD GLUCOSE      POCT Blood Glucose.: 73 mg/dL (2019 06:40)    I&O's Summary    2019 07:  -  2019 07:00  --------------------------------------------------------  IN: 3390 mL / OUT: 750 mL / NET: 2640 mL    2019 07:  -  2019 12:37  --------------------------------------------------------  IN: 340 mL / OUT: 0 mL / NET: 340 mL         ---___---___---___---___---___---                       PHYSICAL EXAM:    GEN: A&O X 0 , NAD , comfortable  HEENT: NCAT, PERRL, MMM, no scleral icterus, hearing intact  NECK: Supple, No JVD  CVS: S1S2 , regular , No M/R/G appreciated  PULM: CTA B/L,  no W/R/R appreciated  ABD.: soft. non tender, non distended,  bowel sounds present  Extrem: intact pulses , no edema noted  Derm: No rash or ecchymosis noted sacral decubitus noted         ---___---___---___---___---___---     <<<  LAB AND IMAGING: >>>                          8.8    33.47 )-----------( 412      ( 2019 09:42 )             29.6               02-    142  |  109<H>  |  74<H>  ----------------------------<  203<H>  3.6   |  17<L>  |  1.04    Ca    8.2<L>      2019 07:16    TPro  6.9  /  Alb  3.7  /  TBili  0.3  /  DBili  x   /  AST  23  /  ALT  18  /  AlkPhos  92  02-    PT/INR - ( 2019 21:23 )   PT: 18.6 sec;   INR: 1.59 ratio         PTT - ( 2019 21:23 )  PTT:44.0 sec       Urinalysis Basic - ( 2019 21:59 )    Color: Yellow / Appearance: Clear / S.023 / pH: x  Gluc: x / Ketone: Negative  / Bili: Negative / Urobili: Negative   Blood: x / Protein: 30 mg/dL / Nitrite: Negative   Leuk Esterase: Large / RBC: 17 /hpf / WBC 27 /HPF   Sq Epi: x / Non Sq Epi: 0 /hpf / Bacteria: Many                ABG - ( 2019 07:19 )  pH, Arterial: 7.34  pH, Blood: x     /  pCO2: 38    /  pO2: 112   / HCO3: 20    / Base Excess: -5.1  /  SaO2: 98                      [All pertinent / recent available Imaging reports and other labs reviewed]     ---___---___---___---___---___---___ ---___---___---___---___---           <<<  A S S E S S M E N T   A N D   P L A N :  >>>          -GI/DVT Prophylaxis.    --------------------------------------------  Case discussed with   Education given on     >>______________________<<      Deniz Bolden .         phone   [phone]

## 2019-02-28 NOTE — CHART NOTE - NSCHARTNOTEFT_GEN_A_CORE
follow up-pt with FS fluctuating 57, 56 then 180-secondary to poor capillary refill; venous blood sugar was 203; continue to monitor condition/FS/ BMP;  per d/w MD / radiology CT chest/abdomen/pelvis-no IV contrast secondary to allergy to IV contrast-anaphylaxis;   per d/w MD, benadryl  5omg iv one dose 1 hour prior to CT scan.  pt with pressure ulcers-admitted with; still with leukocytosis/ID on board.  d/w pt  plan of care .  Abril Easton(NP)  [facility], [phone]

## 2019-02-28 NOTE — PROGRESS NOTE ADULT - ASSESSMENT
69 yo female with multiple medical conditions admitted 2/26 with fever to 103.  She has spent the [facility] of past 9 months in the hospital.  She had difficult to control MRSA bacteremia, finally controlled after RT hip was debrided and explanted.  She has had treatment for pneumonia on  more than 1 occasion.  She has stage 4 decubitus ,peg, and chronic gavin.  No obvious primary site for infection although  tract and sacral wound are possible sources.  I believe she could be challenged with dapto or vanco if necessary, I would prefer to avoid linezolid due to drug interactions.  She also has a chronic DVT and had unexplained fevers x 6 weeks during last hospital stay, with negative CT of C/A/P and negative cultures, rheumatology evaluation was also without an explanation for fever.  1/4 bottles with a coag negative staph, likely a contaminant.CDT is negative  Suggest:  1.await blood and urine cultures  2.Will cover with meropenem pending above.  3.given daily  fever will add daptomycin and micafungin  4.Endorse CT scans,  at bedside  5.We may need to confront medical futility  in this patient

## 2019-02-28 NOTE — CHART NOTE - NSCHARTNOTEFT_GEN_A_CORE
RRT called for hypotension. In brief patient is a 68y F with pmhx of advanced dementia (AOX0 at baseline), bedbound with stage 4 sacral decubitus ulcer presented to the hospital with fevers, found to be septic of unclear etiology. RRT called for SBP 60's, rest of VS wnl, last documented fever of 100.4 at 5am. Patient had been receiving 75cc/hr of NS in setting of HFrEF (EF 20%), labs notable for leukocytosis in 30's with left shift. She was placed on meropenem for ABX coverage. During RRT patient received 1L NS bolus with pressures improved to 90/63. No change in respiratory status noted, satting 100% on RA. Stat labs drawn- ABG, CBC, CMP.  at the bedside, plan discussed with him and medicine NP.     F/u:  - will f/u labs  - monitor VS q 4hrs  - f/u cultures  - R/o to ID attending to clarify abx coverage, ideally would need coverage for anaerobic though patient has extensive allergy hx and has been treated with Zebraxa in the past  - Would be cautious with additional IVF given pts documented EF    Nicholas De Leon PGY3  Spectra [id number]    Please call with any questions or concerns

## 2019-02-28 NOTE — PROGRESS NOTE ADULT - ASSESSMENT
leukocytosis: continue uiv abx   id on borard family wants Dr norman    hypotension  had bolus given   however with her chf we must becareful not tofluid overload   chf continue lisinopril   asthma continue predniosone   fever tylenol for fever  dvt continue eliquis

## 2019-02-28 NOTE — CHART NOTE - NSCHARTNOTEFT_GEN_A_CORE
Upon Nutritional Assessment by the Registered Dietitian your patient was determined to meet criteria / has evidence of the following diagnosis/diagnoses:          [ ]  Mild Protein Calorie Malnutrition        [ ]  Moderate Protein Calorie Malnutrition        [ X] Severe Protein Calorie Malnutrition        [ ] Unspecified Protein Calorie Malnutrition        [X ] Underweight / BMI <19        [ ] Morbid Obesity / BMI > 40      Findings as based on:  [X] Comprehensive nutrition assessment   [ ] Nutrition Focused Physical Exam apparent generalized muscle wasting and fat depletion, weakness, unable to move  [ ] Other:       Nutrition Plan/Recommendations:  initiate enteral nutrtion support,  Glucerna 1.2 bolus feeds 4x daily via PEG  water flushes before and after feeding per med team  add prosource x1 daily, reassess tube feed formula as tolerated  Discussed with team    PROVIDER Section:     By signing this assessment you are acknowledging and agree with the diagnosis/diagnoses assigned by the Registered Dietitian    Comments:

## 2019-02-28 NOTE — PROVIDER CONTACT NOTE (CRITICAL VALUE NOTIFICATION) - ASSESSMENT
pt a&ox0.  blood cultures came back positive for gram positive cocci in clusters in aerobic bottle.  pre liminary results.

## 2019-02-28 NOTE — CHART NOTE - NSCHARTNOTEFT_GEN_A_CORE
Called by RN that, pt was noted to have Temp of 102.5F [Rectally]. Pt was evaluated. Unable to obtain ROS due to underlying medical conditions.     PAST MEDICAL & SURGICAL HISTORY:  CVA (cerebral vascular accident)  Fatty pancreas  PNA (pneumonia)  Pulmonary HTN  IGT (impaired glucose tolerance)  Ulcerative colitis  Acid reflux  Anxiety  Depression  Mouth sores  HLD (hyperlipidemia)  Asthma  Humeral head fracture  H/O: hysterectomy  H/O cataract extraction, left  History of knee replacement    MEDICATIONS  (STANDING):  ALBUTerol    90 MICROgram(s) HFA Inhaler 1 Puff(s) Inhalation every 4 hours  ALBUTerol/ipratropium for Nebulization 3 milliLiter(s) Nebulizer every 6 hours  apixaban 5 milliGRAM(s) Oral every 12 hours  clonazePAM Tablet 1.5 milliGRAM(s) Oral at bedtime  Dakins Solution - 1/4 Strength 1 Application(s) Topical two times a day  dextrose 5%. 1000 milliLiter(s) (50 mL/Hr) IV Continuous <Continuous>  dextrose 50% Injectable 12.5 Gram(s) IV Push once  dextrose 50% Injectable 25 Gram(s) IV Push once  dextrose 50% Injectable 25 Gram(s) IV Push once  famotidine    Tablet 20 milliGRAM(s) Oral daily  ferrous    sulfate Liquid 300 milliGRAM(s) Oral daily  gabapentin   Solution 300 milliGRAM(s) Oral two times a day  insulin lispro (HumaLOG) corrective regimen sliding scale   SubCutaneous every 6 hours  lisinopril 5 milliGRAM(s) Oral daily  Medical Marijuana Oil 1 milliLiter(s) 1 milliLiter(s) SubLingual <User Schedule>  meropenem  IVPB 1000 milliGRAM(s) IV Intermittent every 8 hours  predniSONE   Tablet 7.5 milliGRAM(s) Oral daily  QUEtiapine 25 milliGRAM(s) Oral at bedtime  sodium chloride 0.9%. 1000 milliLiter(s) (70 mL/Hr) IV Continuous <Continuous>  tiotropium 18 MICROgram(s) Capsule 1 Capsule(s) Inhalation daily  tiZANidine 4 milliGRAM(s) Oral <User Schedule>    MEDICATIONS  (PRN):  acetaminophen    Suspension .. 650 milliGRAM(s) Oral every 6 hours PRN Temp greater or equal to 38C (100.4F)  dextrose 40% Gel 15 Gram(s) Oral once PRN Blood Glucose LESS THAN 70 milliGRAM(s)/deciliter  glucagon  Injectable 1 milliGRAM(s) IntraMuscular once PRN Glucose LESS THAN 70 milligrams/deciliter    Vital Signs Last 24 Hrs  T(C): 38.8 (28 Feb 2019 03:40), Max: 39.3 (28 Feb 2019 03:01)  T(F): 101.9 (28 Feb 2019 03:40), Max: 102.8 (28 Feb 2019 03:01)  HR: 110 (27 Feb 2019 23:07) (98 - 111)  BP: 96/60 (27 Feb 2019 23:07) (96/60 - 131/77)  BP(mean): --  RR: 18 (27 Feb 2019 23:07) (18 - 18)  SpO2: 94% (27 Feb 2019 23:07) (93% - 96%)                          11.2   35.90 )-----------( 614      ( 27 Feb 2019 20:13 )             37.1     02-27    139  |  99  |  59<H>  ----------------------------<  211<H>  4.5   |  24  |  0.60    Ca    9.3      27 Feb 2019 10:37    TPro  6.9  /  Alb  3.7  /  TBili  0.3  /  DBili  x   /  AST  23  /  ALT  18  /  AlkPhos  92  02-26    PT/INR - ( 26 Feb 2019 21:23 )   PT: 18.6 sec;   INR: 1.59 ratio         PTT - ( 26 Feb 2019 21:23 )  PTT:44.0 sec    A/P: Patient is a 68y female with a past history of ulcerative colitis,CVA,dementia, rt hip fracture leading to a rt hip arthroplasty in June of 2018, with post op MRSA bacteremia leading to RT hip explant, spacer placement, and prolonged hospital course, home x 5 weeks, now admitted with fever and lethargy x 2 days.    # Fever of unknown source.   - WBC: 35.90.   - UA: Large LE, Neg Nitrites.   - f/u UCX sent on 02/27.   - BCX: GPC in clusters.   - f/u BCX sent on 2/28. Called by RN that, pt was noted to have Temp of 102.5F [Rectally]. Pt was evaluated. Unable to obtain ROS due to underlying medical conditions.     PAST MEDICAL & SURGICAL HISTORY:  CVA (cerebral vascular accident)  Fatty pancreas  PNA (pneumonia)  Pulmonary HTN  IGT (impaired glucose tolerance)  Ulcerative colitis  Acid reflux  Anxiety  Depression  Mouth sores  HLD (hyperlipidemia)  Asthma  Humeral head fracture  H/O: hysterectomy  H/O cataract extraction, left  History of knee replacement    MEDICATIONS  (STANDING):  ALBUTerol    90 MICROgram(s) HFA Inhaler 1 Puff(s) Inhalation every 4 hours  ALBUTerol/ipratropium for Nebulization 3 milliLiter(s) Nebulizer every 6 hours  apixaban 5 milliGRAM(s) Oral every 12 hours  clonazePAM Tablet 1.5 milliGRAM(s) Oral at bedtime  Dakins Solution - 1/4 Strength 1 Application(s) Topical two times a day  dextrose 5%. 1000 milliLiter(s) (50 mL/Hr) IV Continuous <Continuous>  dextrose 50% Injectable 12.5 Gram(s) IV Push once  dextrose 50% Injectable 25 Gram(s) IV Push once  dextrose 50% Injectable 25 Gram(s) IV Push once  famotidine    Tablet 20 milliGRAM(s) Oral daily  ferrous    sulfate Liquid 300 milliGRAM(s) Oral daily  gabapentin   Solution 300 milliGRAM(s) Oral two times a day  insulin lispro (HumaLOG) corrective regimen sliding scale   SubCutaneous every 6 hours  lisinopril 5 milliGRAM(s) Oral daily  Medical Marijuana Oil 1 milliLiter(s) 1 milliLiter(s) SubLingual <User Schedule>  meropenem  IVPB 1000 milliGRAM(s) IV Intermittent every 8 hours  predniSONE   Tablet 7.5 milliGRAM(s) Oral daily  QUEtiapine 25 milliGRAM(s) Oral at bedtime  sodium chloride 0.9%. 1000 milliLiter(s) (70 mL/Hr) IV Continuous <Continuous>  tiotropium 18 MICROgram(s) Capsule 1 Capsule(s) Inhalation daily  tiZANidine 4 milliGRAM(s) Oral <User Schedule>    MEDICATIONS  (PRN):  acetaminophen    Suspension .. 650 milliGRAM(s) Oral every 6 hours PRN Temp greater or equal to 38C (100.4F)  dextrose 40% Gel 15 Gram(s) Oral once PRN Blood Glucose LESS THAN 70 milliGRAM(s)/deciliter  glucagon  Injectable 1 milliGRAM(s) IntraMuscular once PRN Glucose LESS THAN 70 milligrams/deciliter    Vital Signs Last 24 Hrs  T(C): 38.8 (28 Feb 2019 03:40), Max: 39.3 (28 Feb 2019 03:01)  T(F): 101.9 (28 Feb 2019 03:40), Max: 102.8 (28 Feb 2019 03:01)  HR: 110 (27 Feb 2019 23:07) (98 - 111)  BP: 96/60 (27 Feb 2019 23:07) (96/60 - 131/77)  BP(mean): --  RR: 18 (27 Feb 2019 23:07) (18 - 18)  SpO2: 94% (27 Feb 2019 23:07) (93% - 96%)                          11.2   35.90 )-----------( 614      ( 27 Feb 2019 20:13 )             37.1     02-27    139  |  99  |  59<H>  ----------------------------<  211<H>  4.5   |  24  |  0.60    Ca    9.3      27 Feb 2019 10:37    TPro  6.9  /  Alb  3.7  /  TBili  0.3  /  DBili  x   /  AST  23  /  ALT  18  /  AlkPhos  92  02-26    PT/INR - ( 26 Feb 2019 21:23 )   PT: 18.6 sec;   INR: 1.59 ratio         PTT - ( 26 Feb 2019 21:23 )  PTT:44.0 sec    A/P: Patient is a 68y female with a past history of ulcerative colitis,CVA,dementia, rt hip fracture leading to a rt hip arthroplasty in June of 2018, with post op MRSA bacteremia leading to RT hip explant, spacer placement, and prolonged hospital course, home x 5 weeks, now admitted with fever and lethargy x 2 days.    # Fever of unknown source.   - WBC: 35.90.   - UA: Large LE, Neg Nitrites.   - f/u UCX sent on 02/27.   - BCX: GPC in clusters.   - f/u BCX sent on 2/28.  - RVP: NTD.   - C. Diff: NTD.   - CXR: Clear.   - Hip XR: No OM.   - c/w IVF.   - Abx: Meropenem.   - Tylenol prn fever.   - Cooling Measures.   - ID recommends: She had difficult to control MRSA bacteremia, finally controlled after RT hip was debrided and explanted.  - She has had treatment for pneumonia on  more than 1 occasion.  - Wound care.   - f/u ID recs.   - Will endorse to primary team in [place]. CHRISTINE DAY   # [id number]  Medicine PA.

## 2019-02-28 NOTE — CHART NOTE - NSCHARTNOTEFT_GEN_A_CORE
RRT called ~ 2:50PM for hypotension. In brief, patient is a 67 y/o F w/ a PMHx of advanced dementia (AOX0 at baseline), bedbound with stage 4 sacral decubitus ulcer, and HFrEF (EF = 20%) who presented to the hospital with fevers and was found to be septic of unclear etiology. An RRT was called earlier this AM for hypotension with SBP of 60s. Patient was given a 1L NS bolus with improvement into SBP 90s and the RRT was ended. This afternoon, patient was found to have a BP of 60s/40s again, and so another RRT was called.    Upon arrival of the rapid response team, patient was seen laying in bed contracted and she appeared lethargic. Patient's family ( and daughter) was at bedside. Per patient's family and primary RN, patient was less responsive than baseline. On exam, patient was afebrile, BP = 60s/40s, HR = 100-110, SpO2 > 95% on supplemental O2, and FS = 180. Per chart review, patient has been getting maintenance IVF at 70cc/hr since the earlier RRT and IV Micafungin/Daptomycin were added to her antimicrobial regimen. Blood cultures have been negative during hospitalization except for 1/4 bottles on 2/27 that was + for coag negative staph and this is suspected to be a contaminant. During the RRT, an IVF bolus was started immediately; however, patient's BP continued to remain at 60s/40s when repeated multiple times. Discussed case with patient's family at bedside who stated that they would want aggressive measures such as IV pressors to be performed. Patient was subsequently started on a Norepinpehrine gtt with eventual improvement of her BP to 110/70. Patient's mentation was noted to improve once her BP normalized. MICU was notified/consulted and the patient was accepted. The patient was then transferred to the MICU for further management.    Tyrell Ibarra MD PGY3  Pager: [phone] RRT called ~ 2:50PM for hypotension. In brief, patient is a 69 y/o F w/ a PMHx of advanced dementia (AOX0 at baseline), bedbound with stage 4 sacral decubitus ulcer, and HFrEF (EF = 20%) who presented to the hospital with fevers and was found to be septic of unclear etiology. An RRT was called earlier this AM for hypotension with SBP of 60s. Patient was given a 1L NS bolus with improvement into SBP 90s and the RRT was ended. This afternoon, patient was found to have a BP of 60s/40s again, and so another RRT was called.    Upon arrival of the rapid response team, patient was seen laying in bed contracted and she appeared lethargic. Patient's family ( and daughter) was at bedside. Per patient's family and primary RN, patient was less responsive than baseline. On exam, patient was afebrile, BP = 60s/40s, HR = 100-110, SpO2 > 95% on supplemental O2, and FS = 180. Per chart review, patient has been getting maintenance IVF at 70cc/hr since the earlier RRT and IV Micafungin/Daptomycin were added to her antimicrobial regimen. Blood cultures have been negative during hospitalization except for 1/4 bottles on 2/27 that was + for coag negative staph and this is suspected to be a contaminant. During the RRT, an IVF bolus was started immediately; however, patient's BP continued to remain at 60s/40s when repeated multiple times. Discussed case with patient's family at bedside who stated that they would want aggressive measures such as IV pressors to be performed. Patient was subsequently started on a Norepinephrine gtt with eventual improvement of her BP to 110/70. Patient's mentation was noted to improve once her BP normalized. MICU was notified/consulted and the patient was accepted. The patient was then transferred to the MICU for further management.    Tyrell Ibarra MD PGY3  Pager: [phone]

## 2019-02-28 NOTE — PROGRESS NOTE ADULT - ASSESSMENT
#Neuro  CVA    #Resp  PNA  Asthma    #ID  MRSA    #CV  Hypotension  CHF exacerbation, pulmonary HTN  HLD    #GI  Ulcerative colitis, GERD    #Psych  Anxiety, depression #Neuro  CVA, no acute issues, AAOx0 at baseline but alert     #Resp  given POCUS showing diffuse B lines with pleural thickening, concern for infectious etiology in left lung  -will touch base with ID in regards to optimal antibiotic regimen     #ID  -on daptomycin and meropenem, will touch base in regards to better lung penetration   -will replace gavin catheter and send a urine culture; gavin last changed on 2/26    #CV  Hypotension, will continue with levophed and titrate PRN   CHF exacerbation, pulmonary HTN  HLD    #GI  Ulcerative colitis, GERD    #Psych  Anxiety, depression #Neuro  CVA, no acute issues, AAOx0 at baseline but alert   - c/w gabapentin 300 mg bid  - c/w quetiapine 25 mg qD  - c/w clonazepam 1.5 mg qhs    #Resp  given POCUS showing diffuse B lines with pleural thickening, concern for infectious etiology in left lung  -will touch base with ID in regards to optimal antibiotic regimen   - c/w albuterol q4h  - c/w tiotropium 18 mcg qD  - c/w prednisone 7.5 mg qD    #ID  -on daptomycin and meropenem, will touch base in regards to better lung penetration   -will replace gavin catheter and send a urine culture; gavin last changed on 2/26  - c/w meropenem 1000 mg IV q8h  - c/w daptomycin 210 mg IV q48h  - c/w micafungin 100 mg IV qD  - c/w tylenol 650 mg PO prn fever    #CV  Hypotension, will continue with levophed and titrate PRN   CHF exacerbation, pulmonary HTN  HLD  - c/w norepinephrine gtt, wean as tolerated  - c/w apixaban 5 mg q12h  - c/w NS at 70 cc/hr    #GI  Ulcerative colitis, GERD  - c/w famotidine 20 mg PO qD    #Heme  - c/w ferrous sulfate 300 mg qD    #Endo  - c/w ISS    #Psych  Anxiety, depression 68y F with PMH of advanced dementia (AOX0 at baseline) s/p PEG tube, HFrEF (EF 20%), bedbound with stage 4 sacral and R hip decubitus ulcer c/b R hip MRSA infection, presumed asthma on steroids, multiple recent prolonged hospitalizations, presented to the hospital with fevers on 2/26 as well as cough and loose BMs, found to be septic of unclear etiology, course c/b septic shock with SBP 60's now on norepinephrine.    #Neuro  CVA, no acute issues, AAOx0 at baseline but alert   - c/w gabapentin 300 mg bid  - c/w quetiapine 25 mg qD  - c/w clonazepam 1.5 mg qhs  - c/w medical marijuana 1 mL tid  - monitor mental status    #Resp  Possible PNA. Satting well on room air. 2/26 CXR w clear lungs, however POCUS showing diffuse B lines with pleural thickening, concern for infectious etiology in left lung  - c/w antibiotics as below, will follow up with ID in regards to optimal antibiotic regimen   - c/w albuterol q4h  - c/w tiotropium 18 mcg qD  - c/w prednisone 7.5 mg qD    #ID  Septic shock with SBP 60's now improved on levophed. Leukocytosis to 36 with left shift, now 33. Started on meropenem for abx coverage in addition to daptomycin.  - c/w meropenem 1000 mg IV q8h  - c/w daptomycin 210 mg IV q48h  - c/w micafungin 100 mg IV qD  - will follow up with ID in regards to better lung penetration   - replace gavin catheter (last changed on 2/26)  - send repeat urine culture  - c/w tylenol 650 mg PO prn fever    #CV  CHF exacerbation, pulmonary HTN, hypotension likely 2/2 septic shock, given 1L NS bolus at RRT with pressures improved to 90/63, started on levophed. ProBNP 5829.   - c/w norepinephrine gtt, titrate as needed  - c/w antibiotics as above  - c/w apixaban 5 mg q12h  - c/w NS at 70 cc/hr, caution i/s/o HF    #GI  Ulcerative colitis, GERD, s/p PEG tube  - c/w famotidine 20 mg PO qD  - c/w tube feeds    #Heme  - c/w ferrous sulfate 300 mg qD    #Endo  - c/w ISS    #Psych  Anxiety, depression  - c/w quetiapine, clonazepam, medical marijuana as above    #PPx  DVT ppx: 68y F with PMH of HFrEF (EF 20%), advanced dementia (AOX0 at baseline) bedbound with stage 4 sacral and R hip decubitus ulcer c/b R hip MRSA infection s/p PEG and chronic gavin, presumed asthma on steroids, multiple recent prolonged hospitalizations, presented to the hospital on 2/26 with fevers to 103 as well as cough and loose BMs, found to be septic likely 2/2 UTI vs sacral ulcer vs PNA, course c/b septic shock with SBP 60's now on norepinephrine.    #Neuro  CVA, no acute issues, AAOx0 at baseline but alert   - c/w gabapentin 300 mg bid  - c/w quetiapine 25 mg qD  - c/w clonazepam 1.5 mg qhs  - c/w medical marijuana 1 mL tid  - monitor mental status    #Resp  Presumed asthma on home steroids. Possible PNA. Satting well on room air. 2/26 CXR w clear lungs, however POCUS showing diffuse B lines with pleural thickening, concern for infectious etiology in left lung  - c/w antibiotics as below, will follow up with ID in regards to optimal antibiotic regimen   - c/w albuterol q4h  - c/w tiotropium 18 mcg qD  - c/w prednisone 7.5 mg qD  - c/w antibiotics as below    #ID  History of difficult to MRSA bacteremia which was finally controlled after RT hip was debrided and explanted. Also history of pneumonia on more than 1 occasion s/p treatment. Unexplained fevers x 6 weeks during last hospital stay, with negative CT of C/A/P and negative cultures, rheumatology evaluation was also without an explanation for fever. 1/4 bottles with a coag negative staph, likely a contaminant. CDT is negative.  Septic shock 2/2 UTI vs decub ulcer vs PNA. Leukocytosis to 36 with left shift, stable at 33. UA positive, UCx 2/27 w >100K GNRs & GPCs. BCx 2/27 w coag neg staph. RVP negative. CXR 2/26 negative for infection, however POCUS suggestive of PNA as described above. Started on meropenem for abx coverage in addition to daptomycin.  - c/w meropenem 1000 mg IV q8h  - start daptomycin 210 mg IV q48h  - start micafungin 100 mg IV qD  - will follow up with ID in regards to better lung penetration   - replace gavin catheter (last changed on 2/26)  - send repeat urine culture  - f/u BCx & sputum Cx  - c/w tylenol 650 mg PO prn fever    #CV  CHF exacerbation, pulmonary HTN, proBNP 5829.  Hypotension w SBP 60s likely 2/2 septic shock, given 1L NS bolus at RRT with pressures improved to 90/63, started on levophed. Lactate 1.2.  - c/w norepinephrine gtt, titrate as needed  - c/w antibiotics as above  - c/w NS at 70 cc/hr, caution i/s/o HF  - monitor I&Os & daily wts    #GI  Ulcerative colitis, GERD, s/p PEG tube  - c/w famotidine 20 mg PO qD  - c/w tube feeds  - monitor BMs    #Heme  chronic DVT  - c/w apixaban 5 mg q12h  Iron deficiency anemia w stable Hg ~9 and no e/o active bleed  - c/w ferrous sulfate 300 mg qD  - monitor CBC, maintain active T&S, transfuse for Hg < 7    #Endo  No h/o diabetes. HgA1C 5.3%.  - c/w ISS    #Psych  Anxiety, depression  - c/w quetiapine, clonazepam, medical marijuana as above    #PPx  DVT ppx: c/w apixaban      Jo Ann Eid PGY-1  Internal Medicine [facility]  Pager [phone] / [phone] 68y F with PMH of HFrEF (EF 20%), advanced dementia (AOX0 at baseline) bedbound with stage 4 sacral and R hip decubitus ulcer c/b R hip MRSA infection s/p PEG and chronic gavin, presumed asthma on steroids, multiple recent prolonged hospitalizations, presented to the hospital on 2/26 with fevers to 103 as well as cough and loose BMs, found to be septic likely 2/2 UTI vs sacral ulcer vs PNA, course c/b septic shock with SBP 60's now on norepinephrine.    #Neuro  CVA, no acute issues, AAOx0 at baseline but alert   - c/w gabapentin 300 mg bid  - c/w quetiapine 25 mg qD  - c/w clonazepam 1.5 mg qhs  - c/w medical marijuana 1 mL tid  - monitor mental status    #Resp  Presumed asthma on home steroids. Possible PNA. Satting well on room air. 2/26 CXR w clear lungs, however POCUS showing diffuse B lines with pleural thickening, concern for infectious etiology in left lung  - c/w antibiotics as below, will follow up with ID in regards to optimal antibiotic regimen   - c/w albuterol q4h  - c/w tiotropium 18 mcg qD  - c/w prednisone 7.5 mg qD  - c/w antibiotics as below    #ID  History of difficult to control MRSA bacteremia which resolved after RT hip was debrided and explanted. Also history of pneumonia on more than 1 occasion s/p treatment. Unexplained fevers x 6 weeks during last hospital stay, with negative CT of C/A/P and negative cultures, rheumatology evaluation was also without an explanation for fever. 1/4 bottles with a coag negative staph, likely a contaminant. CDT is negative.  Now with septic shock 2/2 UTI vs decub ulcer vs PNA. Leukocytosis to 36 with left shift, stable at 33. UA positive, UCx 2/27 w >100K GNRs & GPCs. BCx 2/27 w coag neg staph. RVP negative. CXR 2/26 negative for infection, however POCUS suggestive of PNA as described above. Started on meropenem for abx coverage in addition to daptomycin.  - c/w meropenem 1000 mg IV q8h  - start daptomycin 210 mg IV q48h  - start micafungin 100 mg IV qD  - will follow up with ID in regards to better lung penetration   - replace gavin catheter (last changed on 2/26)  - send repeat urine culture  - f/u BCx & sputum Cx  - c/w tylenol 650 mg PO prn fever    #CV  HFrEF w EF 26%, pulmonary HTN, proBNP 5829.  Hypotension w SBP 60s likely 2/2 septic shock, given 1L NS bolus at RRT with pressures improved to 90/63, started on levophed. Lactate 1.2.  - c/w norepinephrine gtt, titrate as needed  - c/w antibiotics as above  - c/w NS at 70 cc/hr, caution i/s/o HF  - monitor I&Os & daily wts    #GI  Ulcerative colitis, GERD, s/p PEG tube  - c/w famotidine 20 mg PO qD  - c/w tube feeds  - monitor BMs    #Heme  chronic DVT  - c/w apixaban 5 mg q12h  Iron deficiency anemia w stable Hg ~9 and no e/o active bleed  - c/w ferrous sulfate 300 mg qD  - monitor CBC, maintain active T&S, transfuse for Hg < 7    #Endo  No h/o diabetes. HgA1C 5.3%.  - c/w ISS    #Psych  Anxiety, depression  - c/w quetiapine, clonazepam, medical marijuana as above    #PPx  DVT ppx: c/w apixaban      Jo Ann Eid PGY-1  Internal Medicine [facility]  Pager [phone] / [phone]

## 2019-02-28 NOTE — PROVIDER CONTACT NOTE (CRITICAL VALUE NOTIFICATION) - SITUATION
blood cultures came back positive for gram positive cocci in clusters in aerobic bottle.  pre liminary

## 2019-02-28 NOTE — PROGRESS NOTE ADULT - SUBJECTIVE AND OBJECTIVE BOX
***INCOMPLETE***      CHIEF COMPLAINT: Patient is a 68y old  Female who presents with a chief complaint of Fevers for the past 2 days at home (2019 14:28)      INTERVAL HISTORY / SUBJECTIVE:  ***    MEDICATIONS:  MEDICATIONS  (STANDING):  ALBUTerol    90 MICROgram(s) HFA Inhaler 1 Puff(s) Inhalation every 4 hours  ALBUTerol/ipratropium for Nebulization 3 milliLiter(s) Nebulizer every 6 hours  apixaban 5 milliGRAM(s) Oral every 12 hours  clonazePAM Tablet 1.5 milliGRAM(s) Oral at bedtime  Dakins Solution - 1/4 Strength 1 Application(s) Topical two times a day  DAPTOmycin IVPB 210 milliGRAM(s) IV Intermittent every 48 hours  dextrose 5%. 1000 milliLiter(s) (50 mL/Hr) IV Continuous <Continuous>  dextrose 50% Injectable 12.5 Gram(s) IV Push once  dextrose 50% Injectable 25 Gram(s) IV Push once  dextrose 50% Injectable 25 Gram(s) IV Push once  diphenhydrAMINE   Injectable 50 milliGRAM(s) IV Push once  famotidine    Tablet 20 milliGRAM(s) Oral daily  ferrous    sulfate Liquid 300 milliGRAM(s) Oral daily  gabapentin   Solution 300 milliGRAM(s) Oral two times a day  insulin lispro (HumaLOG) corrective regimen sliding scale   SubCutaneous every 6 hours  Medical Marijuana Oil 1 milliLiter(s) 1 milliLiter(s) SubLingual <User Schedule>  meropenem  IVPB 1000 milliGRAM(s) IV Intermittent every 8 hours  micafungin IVPB 100 milliGRAM(s) IV Intermittent every 24 hours  norepinephrine Infusion 0.05 MICROgram(s)/kG/Min (3.356 mL/Hr) IV Continuous <Continuous>  predniSONE   Tablet 7.5 milliGRAM(s) Oral daily  QUEtiapine 25 milliGRAM(s) Oral at bedtime  sodium chloride 0.9%. 1000 milliLiter(s) (70 mL/Hr) IV Continuous <Continuous>  tiotropium 18 MICROgram(s) Capsule 1 Capsule(s) Inhalation daily  tiZANidine 4 milliGRAM(s) Oral <User Schedule>    MEDICATIONS  (PRN):  acetaminophen    Suspension .. 650 milliGRAM(s) Oral every 6 hours PRN Temp greater or equal to 38C (100.4F)  dextrose 40% Gel 15 Gram(s) Oral once PRN Blood Glucose LESS THAN 70 milliGRAM(s)/deciliter  glucagon  Injectable 1 milliGRAM(s) IntraMuscular once PRN Glucose LESS THAN 70 milligrams/deciliter      OBJECTIVE:  ICU Vital Signs Last 24 Hrs  T(C): 36.9 (2019 14:45), Max: 39.3 (2019 03:01)  T(F): 98.5 (2019 14:45), Max: 102.8 (2019 03:01)  HR: 112 (2019 14:45) (98 - 112)  BP: 64/53 (2019 14:45) (64/53 - 115/67)  BP(mean): --  ABP: --  ABP(mean): --  RR: 18 (2019 07:14) (18 - 18)  SpO2: 95% (2019 07:14) (94% - 95%)      CAPILLARY BLOOD GLUCOSE    POCT Blood Glucose.: 180 mg/dL (2019 13:40)  POCT Blood Glucose.: 56 mg/dL (2019 13:22)  POCT Blood Glucose.: 57 mg/dL (2019 13:21)  POCT Blood Glucose.: 79 mg/dL (2019 12:43)  POCT Blood Glucose.: 73 mg/dL (2019 06:40)  POCT Blood Glucose.: 51 mg/dL (2019 06:39)  POCT Blood Glucose.: 90 mg/dL (2019 06:06)  POCT Blood Glucose.: 69 mg/dL (2019 06:04)  POCT Blood Glucose.: 139 mg/dL (2019 23:59)  POCT Blood Glucose.: 104 mg/dL (2019 17:06)      I&O's Summary    2019 07:01  -  2019 07:00  --------------------------------------------------------  IN: 3390 mL / OUT: 750 mL / NET: 2640 mL    2019 07:01  -  2019 15:19  --------------------------------------------------------  IN: 730 mL / OUT: 0 mL / NET: 730 mL      PHYSICAL EXAM:  GEN: A&O X 0 , NAD , comfortable  HEENT: NCAT, PERRL, MMM, no scleral icterus, hearing intact  NECK: Supple, No JVD  CVS: S1S2 , regular , No M/R/G appreciated  PULM: CTA B/L,  no W/R/R appreciated  ABD.: soft. non tender, non distended,  bowel sounds present  Extrem: intact pulses , no edema noted  Derm: No rash or ecchymosis noted sacral decubitus noted       LABS:    ABG - ( 2019 07:19 )  pH, Arterial: 7.34  pH, Blood: x     /  pCO2: 38    /  pO2: 112   / HCO3: 20    / Base Excess: -5.1  /  SaO2: 98                   8.8    33.47 )-----------( 412      ( 2019 09:42 )             29.6     02-28    142  |  109<H>  |  74<H>  ----------------------------<  203<H>  3.6   |  17<L>  |  1.04    Ca    8.2<L>      2019 07:16    TPro  6.9  /  Alb  3.7  /  TBili  0.3  /  DBili  x   /  AST  23  /  ALT  18  /  AlkPhos  92  02-26    LIVER FUNCTIONS - ( 2019 21:23 )  Alb: 3.7 g/dL / Pro: 6.9 g/dL / ALK PHOS: 92 U/L / ALT: 18 U/L / AST: 23 U/L / GGT: x           PT/INR - ( 2019 21:23 )   PT: 18.6 sec;   INR: 1.59 ratio      PTT - ( 2019 21:23 )  PTT:44.0 sec    Urinalysis Basic - ( 2019 21:59 )    Color: Yellow / Appearance: Clear / S.023 / pH: x  Gluc: x / Ketone: Negative  / Bili: Negative / Urobili: Negative   Blood: x / Protein: 30 mg/dL / Nitrite: Negative   Leuk Esterase: Large / RBC: 17 /hpf / WBC 27 /HPF   Sq Epi: x / Non Sq Epi: 0 /hpf / Bacteria: Many      IMAGIN/17 CT Chest:    LUNGS AND LARGE AIRWAYS: Limited evaluation secondary to respiratory   motion artifact. Significant debris is noted within the right main, left   main, and left lower lobe bronchi. Interlobular septal thickening with   diffuse patchy groundglass opacities.  PLEURA: Small bilateral pleural effusions with associated passive   atelectasis.  VESSELS: Atherosclerotic disease of the aorta and coronary arteries.  HEART: Stable cardiomegaly. No pericardial effusion.  MEDIASTINUM AND DILLAN: The esophagus is filled with debris. No   lymphadenopathy.  CHEST WALL AND LOWER NECK: Anasarca.  VISUALIZED UPPER ABDOMEN: Hepatomegaly.  BONES: Chronic mild anterior T3 compression deformity. Spinal   degenerative changes.    IMPRESSION:     Debris-filled esophagus with secretions in the right main, left main, and   left lower lobar bronchi likely secondary to aspiration.    Moderate pulmonary edema with small bilateral pleural effusions.   Underlying pneumonia cannot be excluded.         CT A/P:    LOWER CHEST: Cardiomegaly.  GALLBLADDER: Cholecystectomy.  BLADDER: Collapsed with a Blackman catheter.  REPRODUCTIVE ORGANS: Limited evaluation secondary to extensive streak   artifact from right lower extremity hardware  BOWEL: Gastrostomy balloon within the stomach. No bowel obstruction.   Appendix is not visualized.  VESSELS:  Atheromatous disease of the abdominal aorta.  ABDOMINAL WALL: Anasarca.  BONES: Degenerative changes of the spine. Complete vertebral body height   loss of L2. Total right hip arthroplasty. Chronic bilateral rib fractures   and left acetabular fracture. Mild anterolisthesis of L4 over L5,   unchanged compared to prior exam of 2018.    IMPRESSION:  No acute intra-abdominal pathology or collection. ***INCOMPLETE***      CHIEF COMPLAINT: Patient is a 68y old  Female who presents with a chief complaint of Fevers for the past 2 days at home (2019 14:28)      INTERVAL HISTORY / SUBJECTIVE:  ***  Septic shock i/s/o HF, given 1 L at RRT, started on levo, and transferred to MICU for further management.      MEDICATIONS:  MEDICATIONS  (STANDING):  ALBUTerol    90 MICROgram(s) HFA Inhaler 1 Puff(s) Inhalation every 4 hours  ALBUTerol/ipratropium for Nebulization 3 milliLiter(s) Nebulizer every 6 hours  apixaban 5 milliGRAM(s) Oral every 12 hours  clonazePAM Tablet 1.5 milliGRAM(s) Oral at bedtime  Dakins Solution - 1/4 Strength 1 Application(s) Topical two times a day  DAPTOmycin IVPB 210 milliGRAM(s) IV Intermittent every 48 hours  dextrose 5%. 1000 milliLiter(s) (50 mL/Hr) IV Continuous <Continuous>  dextrose 50% Injectable 12.5 Gram(s) IV Push once  dextrose 50% Injectable 25 Gram(s) IV Push once  dextrose 50% Injectable 25 Gram(s) IV Push once  diphenhydrAMINE   Injectable 50 milliGRAM(s) IV Push once  famotidine    Tablet 20 milliGRAM(s) Oral daily  ferrous    sulfate Liquid 300 milliGRAM(s) Oral daily  gabapentin   Solution 300 milliGRAM(s) Oral two times a day  insulin lispro (HumaLOG) corrective regimen sliding scale   SubCutaneous every 6 hours  Medical Marijuana Oil 1 milliLiter(s) 1 milliLiter(s) SubLingual <User Schedule>  meropenem  IVPB 1000 milliGRAM(s) IV Intermittent every 8 hours  micafungin IVPB 100 milliGRAM(s) IV Intermittent every 24 hours  norepinephrine Infusion 0.05 MICROgram(s)/kG/Min (3.356 mL/Hr) IV Continuous <Continuous>  predniSONE   Tablet 7.5 milliGRAM(s) Oral daily  QUEtiapine 25 milliGRAM(s) Oral at bedtime  sodium chloride 0.9%. 1000 milliLiter(s) (70 mL/Hr) IV Continuous <Continuous>  tiotropium 18 MICROgram(s) Capsule 1 Capsule(s) Inhalation daily  tiZANidine 4 milliGRAM(s) Oral <User Schedule>    MEDICATIONS  (PRN):  acetaminophen    Suspension .. 650 milliGRAM(s) Oral every 6 hours PRN Temp greater or equal to 38C (100.4F)  dextrose 40% Gel 15 Gram(s) Oral once PRN Blood Glucose LESS THAN 70 milliGRAM(s)/deciliter  glucagon  Injectable 1 milliGRAM(s) IntraMuscular once PRN Glucose LESS THAN 70 milligrams/deciliter      OBJECTIVE:  ICU Vital Signs Last 24 Hrs  T(C): 36.9 (2019 14:45), Max: 39.3 (2019 03:01)  T(F): 98.5 (2019 14:45), Max: 102.8 (2019 03:01)  HR: 112 (2019 14:45) (98 - 112)  BP: 64/53 (2019 14:45) (64/53 - 115/67)  BP(mean): --  ABP: --  ABP(mean): --  RR: 18 (2019 07:14) (18 - 18)  SpO2: 95% (2019 07:14) (94% - 95%)      CAPILLARY BLOOD GLUCOSE    POCT Blood Glucose.: 180 mg/dL (2019 13:40)  POCT Blood Glucose.: 56 mg/dL (2019 13:22)  POCT Blood Glucose.: 57 mg/dL (2019 13:21)  POCT Blood Glucose.: 79 mg/dL (2019 12:43)  POCT Blood Glucose.: 73 mg/dL (2019 06:40)  POCT Blood Glucose.: 51 mg/dL (2019 06:39)  POCT Blood Glucose.: 90 mg/dL (2019 06:06)  POCT Blood Glucose.: 69 mg/dL (2019 06:04)  POCT Blood Glucose.: 139 mg/dL (2019 23:59)  POCT Blood Glucose.: 104 mg/dL (2019 17:06)      I&O's Summary    2019 07:01  -  2019 07:00  --------------------------------------------------------  IN: 3390 mL / OUT: 750 mL / NET: 2640 mL    2019 07:01  -  2019 15:19  --------------------------------------------------------  IN: 730 mL / OUT: 0 mL / NET: 730 mL      PHYSICAL EXAM:  GEN: A&O X 0 , NAD , comfortable  HEENT: NCAT, PERRL, MMM, no scleral icterus, hearing intact  NECK: Supple, No JVD  CVS: S1S2 , regular , No M/R/G appreciated  PULM: CTA B/L,  no W/R/R appreciated  ABD.: soft. non tender, non distended,  bowel sounds present  Extrem: intact pulses , no edema noted  Derm: No rash or ecchymosis noted sacral decubitus noted       LABS:    ABG - ( 2019 07:19 )  pH, Arterial: 7.34  pH, Blood: x     /  pCO2: 38    /  pO2: 112   / HCO3: 20    / Base Excess: -5.1  /  SaO2: 98                   8.8    33.47 )-----------( 412      ( 2019 09:42 )             29.6     02-    142  |  109<H>  |  74<H>  ----------------------------<  203<H>  3.6   |  17<L>  |  1.04    Ca    8.2<L>      2019 07:16    TPro  6.9  /  Alb  3.7  /  TBili  0.3  /  DBili  x   /  AST  23  /  ALT  18  /  AlkPhos  92  02-    LIVER FUNCTIONS - ( 2019 21:23 )  Alb: 3.7 g/dL / Pro: 6.9 g/dL / ALK PHOS: 92 U/L / ALT: 18 U/L / AST: 23 U/L / GGT: x           PT/INR - ( 2019 21:23 )   PT: 18.6 sec;   INR: 1.59 ratio      PTT - ( 2019 21:23 )  PTT:44.0 sec    Urinalysis Basic - ( 2019 21:59 )    Color: Yellow / Appearance: Clear / S.023 / pH: x  Gluc: x / Ketone: Negative  / Bili: Negative / Urobili: Negative   Blood: x / Protein: 30 mg/dL / Nitrite: Negative   Leuk Esterase: Large / RBC: 17 /hpf / WBC 27 /HPF   Sq Epi: x / Non Sq Epi: 0 /hpf / Bacteria: Many      IMAGIN/22 CT A/P:    LOWER CHEST: Cardiomegaly.  GALLBLADDER: Cholecystectomy.  BLADDER: Collapsed with a Blackman catheter.  REPRODUCTIVE ORGANS: Limited evaluation secondary to extensive streak   artifact from right lower extremity hardware  BOWEL: Gastrostomy balloon within the stomach. No bowel obstruction.   Appendix is not visualized.  VESSELS:  Atheromatous disease of the abdominal aorta.  ABDOMINAL WALL: Anasarca.  BONES: Degenerative changes of the spine. Complete vertebral body height   loss of L2. Total right hip arthroplasty. Chronic bilateral rib fractures   and left acetabular fracture. Mild anterolisthesis of L4 over L5,   unchanged compared to prior exam of 2018.    IMPRESSION:  No acute intra-abdominal pathology or collection.       CT Chest:    LUNGS AND LARGE AIRWAYS: Limited evaluation secondary to respiratory   motion artifact. Significant debris is noted within the right main, left   main, and left lower lobe bronchi. Interlobular septal thickening with   diffuse patchy groundglass opacities.  PLEURA: Small bilateral pleural effusions with associated passive   atelectasis.  VESSELS: Atherosclerotic disease of the aorta and coronary arteries.  HEART: Stable cardiomegaly. No pericardial effusion.  MEDIASTINUM AND DILLAN: The esophagus is filled with debris. No   lymphadenopathy.  CHEST WALL AND LOWER NECK: Anasarca.  VISUALIZED UPPER ABDOMEN: Hepatomegaly.  BONES: Chronic mild anterior T3 compression deformity. Spinal   degenerative changes.    IMPRESSION:     Debris-filled esophagus with secretions in the right main, left main, and   left lower lobar bronchi likely secondary to aspiration.    Moderate pulmonary edema with small bilateral pleural effusions.   Underlying pneumonia cannot be excluded. MICU ACCEPTANCE NOTE    68y F with pmhx of advanced dementia (AOX0 at baseline), bedbound with stage 4 sacral decubitus ulcer presented to the hospital with fevers on , found to be septic of unclear etiology. RRT called for SBP 60's, rest of VS wnl, last documented fever of 100.4 at 5am. Patient had been receiving 75cc/hr of NS in setting of HFrEF (EF 20%), labs notable for leukocytosis in 30's with left shift. She was placed on meropenem for ABX coverage in addition to daptomycin. During RRT patient received 1L NS bolus with pressures improved to 90/63. No change in respiratory status noted, satting 100% on RA.  Patient was put on levophed at a rate of .5 mcg/kg/min with significant improvement in hypotension.  Patient brought to the MICU for further management.  On bedside POCUS, patient has diffuse B lines with pleural thickening on the left lung.      MEDICATIONS:  MEDICATIONS  (STANDING):  ALBUTerol    90 MICROgram(s) HFA Inhaler 1 Puff(s) Inhalation every 4 hours  ALBUTerol/ipratropium for Nebulization 3 milliLiter(s) Nebulizer every 6 hours  apixaban 5 milliGRAM(s) Oral every 12 hours  clonazePAM Tablet 1.5 milliGRAM(s) Oral at bedtime  Dakins Solution - 1/4 Strength 1 Application(s) Topical two times a day  DAPTOmycin IVPB 210 milliGRAM(s) IV Intermittent every 48 hours  dextrose 5%. 1000 milliLiter(s) (50 mL/Hr) IV Continuous <Continuous>  dextrose 50% Injectable 12.5 Gram(s) IV Push once  dextrose 50% Injectable 25 Gram(s) IV Push once  dextrose 50% Injectable 25 Gram(s) IV Push once  diphenhydrAMINE   Injectable 50 milliGRAM(s) IV Push once  famotidine    Tablet 20 milliGRAM(s) Oral daily  ferrous    sulfate Liquid 300 milliGRAM(s) Oral daily  gabapentin   Solution 300 milliGRAM(s) Oral two times a day  insulin lispro (HumaLOG) corrective regimen sliding scale   SubCutaneous every 6 hours  Medical Marijuana Oil 1 milliLiter(s) 1 milliLiter(s) SubLingual <User Schedule>  meropenem  IVPB 1000 milliGRAM(s) IV Intermittent every 8 hours  micafungin IVPB 100 milliGRAM(s) IV Intermittent every 24 hours  norepinephrine Infusion 0.05 MICROgram(s)/kG/Min (3.356 mL/Hr) IV Continuous <Continuous>  predniSONE   Tablet 7.5 milliGRAM(s) Oral daily  QUEtiapine 25 milliGRAM(s) Oral at bedtime  sodium chloride 0.9%. 1000 milliLiter(s) (70 mL/Hr) IV Continuous <Continuous>  tiotropium 18 MICROgram(s) Capsule 1 Capsule(s) Inhalation daily  tiZANidine 4 milliGRAM(s) Oral <User Schedule>    MEDICATIONS  (PRN):  acetaminophen    Suspension .. 650 milliGRAM(s) Oral every 6 hours PRN Temp greater or equal to 38C (100.4F)  dextrose 40% Gel 15 Gram(s) Oral once PRN Blood Glucose LESS THAN 70 milliGRAM(s)/deciliter  glucagon  Injectable 1 milliGRAM(s) IntraMuscular once PRN Glucose LESS THAN 70 milligrams/deciliter      OBJECTIVE:  ICU Vital Signs Last 24 Hrs  T(C): 36.9 (2019 14:45), Max: 39.3 (2019 03:01)  T(F): 98.5 (2019 14:45), Max: 102.8 (2019 03:01)  HR: 112 (2019 14:45) (98 - 112)  BP: 64/53 (2019 14:45) (64/53 - 115/67)  BP(mean): --  ABP: --  ABP(mean): --  RR: 18 (2019 07:14) (18 - 18)  SpO2: 95% (2019 07:14) (94% - 95%)      CAPILLARY BLOOD GLUCOSE    POCT Blood Glucose.: 180 mg/dL (2019 13:40)  POCT Blood Glucose.: 56 mg/dL (2019 13:22)  POCT Blood Glucose.: 57 mg/dL (2019 13:21)  POCT Blood Glucose.: 79 mg/dL (2019 12:43)  POCT Blood Glucose.: 73 mg/dL (2019 06:40)  POCT Blood Glucose.: 51 mg/dL (2019 06:39)  POCT Blood Glucose.: 90 mg/dL (2019 06:06)  POCT Blood Glucose.: 69 mg/dL (2019 06:04)  POCT Blood Glucose.: 139 mg/dL (2019 23:59)  POCT Blood Glucose.: 104 mg/dL (2019 17:06)      I&O's Summary    2019 07:01  -  2019 07:00  --------------------------------------------------------  IN: 3390 mL / OUT: 750 mL / NET: 2640 mL    2019 07:01  -  2019 15:19  --------------------------------------------------------  IN: 730 mL / OUT: 0 mL / NET: 730 mL      PHYSICAL EXAM:  GEN: A&O X 0 , NAD , comfortable  HEENT: NCAT, PERRL, MMM, no scleral icterus, hearing intact  NECK: Supple, No JVD  CVS: S1S2 , tachycardic, No M/R/G appreciated  PULM: CTA B/L,  diminished at bases bilaterally   ABD.: soft. non tender, non distended  Extrem: intact pulses , no edema noted  Derm: stage 4 sacral decubitis ulcer and ulcer at right hip noted       LABS:    ABG - ( 2019 07:19 )  pH, Arterial: 7.34  pH, Blood: x     /  pCO2: 38    /  pO2: 112   / HCO3: 20    / Base Excess: -5.1  /  SaO2: 98                   8.8    33.47 )-----------( 412      ( 2019 09:42 )             29.6     02    142  |  109<H>  |  74<H>  ----------------------------<  203<H>  3.6   |  17<L>  |  1.04    Ca    8.2<L>      2019 07:16    TPro  6.9  /  Alb  3.7  /  TBili  0.3  /  DBili  x   /  AST  23  /  ALT  18  /  AlkPhos  92  -    LIVER FUNCTIONS - ( 2019 21:23 )  Alb: 3.7 g/dL / Pro: 6.9 g/dL / ALK PHOS: 92 U/L / ALT: 18 U/L / AST: 23 U/L / GGT: x           PT/INR - ( 2019 21:23 )   PT: 18.6 sec;   INR: 1.59 ratio      PTT - ( 2019 21:23 )  PTT:44.0 sec    Urinalysis Basic - ( 2019 21:59 )    Color: Yellow / Appearance: Clear / S.023 / pH: x  Gluc: x / Ketone: Negative  / Bili: Negative / Urobili: Negative   Blood: x / Protein: 30 mg/dL / Nitrite: Negative   Leuk Esterase: Large / RBC: 17 /hpf / WBC 27 /HPF   Sq Epi: x / Non Sq Epi: 0 /hpf / Bacteria: Many      IMAGIN/22 CT A/P:    LOWER CHEST: Cardiomegaly.  GALLBLADDER: Cholecystectomy.  BLADDER: Collapsed with a Blackman catheter.  REPRODUCTIVE ORGANS: Limited evaluation secondary to extensive streak   artifact from right lower extremity hardware  BOWEL: Gastrostomy balloon within the stomach. No bowel obstruction.   Appendix is not visualized.  VESSELS:  Atheromatous disease of the abdominal aorta.  ABDOMINAL WALL: Anasarca.  BONES: Degenerative changes of the spine. Complete vertebral body height   loss of L2. Total right hip arthroplasty. Chronic bilateral rib fractures   and left acetabular fracture. Mild anterolisthesis of L4 over L5,   unchanged compared to prior exam of 2018.    IMPRESSION:  No acute intra-abdominal pathology or collection.       CT Chest:    LUNGS AND LARGE AIRWAYS: Limited evaluation secondary to respiratory   motion artifact. Significant debris is noted within the right main, left   main, and left lower lobe bronchi. Interlobular septal thickening with   diffuse patchy groundglass opacities.  PLEURA: Small bilateral pleural effusions with associated passive   atelectasis.  VESSELS: Atherosclerotic disease of the aorta and coronary arteries.  HEART: Stable cardiomegaly. No pericardial effusion.  MEDIASTINUM AND DILLAN: The esophagus is filled with debris. No   lymphadenopathy.  CHEST WALL AND LOWER NECK: Anasarca.  VISUALIZED UPPER ABDOMEN: Hepatomegaly.  BONES: Chronic mild anterior T3 compression deformity. Spinal   degenerative changes.    IMPRESSION:     Debris-filled esophagus with secretions in the right main, left main, and   left lower lobar bronchi likely secondary to aspiration.    Moderate pulmonary edema with small bilateral pleural effusions.   Underlying pneumonia cannot be excluded. MICU ACCEPTANCE NOTE    68y F with pmhx of advanced dementia (AOX0 at baseline), bedbound with stage 4 sacral decubitus ulcer presented to the hospital with fevers on , found to be septic of unclear etiology. RRT called for SBP 60's, rest of VS wnl, last documented fever of 100.4 at 5am. Patient had been receiving 75cc/hr of NS in setting of HFrEF (EF 20%), labs notable for leukocytosis in 30's with left shift. She was placed on meropenem for ABX coverage in addition to daptomycin. During RRT patient received 1L NS bolus with pressures improved to 90/63. No change in respiratory status noted, satting 100% on RA.  Patient was put on levophed at a rate of .5 mcg/kg/min with significant improvement in hypotension.  Patient brought to the MICU for further management.  On bedside POCUS, patient has diffuse B lines with pleural thickening on the left lung.      MEDICATIONS:  MEDICATIONS  (STANDING):  ALBUTerol    90 MICROgram(s) HFA Inhaler 1 Puff(s) Inhalation every 4 hours  ALBUTerol/ipratropium for Nebulization 3 milliLiter(s) Nebulizer every 6 hours  apixaban 5 milliGRAM(s) Oral every 12 hours  clonazePAM Tablet 1.5 milliGRAM(s) Oral at bedtime  Dakins Solution - 1/4 Strength 1 Application(s) Topical two times a day  DAPTOmycin IVPB 210 milliGRAM(s) IV Intermittent every 48 hours  dextrose 5%. 1000 milliLiter(s) (50 mL/Hr) IV Continuous <Continuous>  dextrose 50% Injectable 12.5 Gram(s) IV Push once  dextrose 50% Injectable 25 Gram(s) IV Push once  dextrose 50% Injectable 25 Gram(s) IV Push once  diphenhydrAMINE   Injectable 50 milliGRAM(s) IV Push once  famotidine    Tablet 20 milliGRAM(s) Oral daily  ferrous    sulfate Liquid 300 milliGRAM(s) Oral daily  gabapentin   Solution 300 milliGRAM(s) Oral two times a day  insulin lispro (HumaLOG) corrective regimen sliding scale   SubCutaneous every 6 hours  Medical Marijuana Oil 1 milliLiter(s) 1 milliLiter(s) SubLingual <User Schedule>  meropenem  IVPB 1000 milliGRAM(s) IV Intermittent every 8 hours  micafungin IVPB 100 milliGRAM(s) IV Intermittent every 24 hours  norepinephrine Infusion 0.05 MICROgram(s)/kG/Min (3.356 mL/Hr) IV Continuous <Continuous>  predniSONE   Tablet 7.5 milliGRAM(s) Oral daily  QUEtiapine 25 milliGRAM(s) Oral at bedtime  sodium chloride 0.9%. 1000 milliLiter(s) (70 mL/Hr) IV Continuous <Continuous>  tiotropium 18 MICROgram(s) Capsule 1 Capsule(s) Inhalation daily  tiZANidine 4 milliGRAM(s) Oral <User Schedule>    MEDICATIONS  (PRN):  acetaminophen    Suspension .. 650 milliGRAM(s) Oral every 6 hours PRN Temp greater or equal to 38C (100.4F)  dextrose 40% Gel 15 Gram(s) Oral once PRN Blood Glucose LESS THAN 70 milliGRAM(s)/deciliter  glucagon  Injectable 1 milliGRAM(s) IntraMuscular once PRN Glucose LESS THAN 70 milligrams/deciliter      OBJECTIVE:  ICU Vital Signs Last 24 Hrs  T(C): 36.9 (2019 14:45), Max: 39.3 (2019 03:01)  T(F): 98.5 (2019 14:45), Max: 102.8 (2019 03:01)  HR: 112 (2019 14:45) (98 - 112)  BP: 64/53 (2019 14:45) (64/53 - 115/67)  BP(mean): --  ABP: --  ABP(mean): --  RR: 18 (2019 07:14) (18 - 18)  SpO2: 95% (2019 07:14) (94% - 95%)      CAPILLARY BLOOD GLUCOSE    POCT Blood Glucose.: 180 mg/dL (2019 13:40)  POCT Blood Glucose.: 56 mg/dL (2019 13:22)  POCT Blood Glucose.: 57 mg/dL (2019 13:21)  POCT Blood Glucose.: 79 mg/dL (2019 12:43)  POCT Blood Glucose.: 73 mg/dL (2019 06:40)  POCT Blood Glucose.: 51 mg/dL (2019 06:39)  POCT Blood Glucose.: 90 mg/dL (2019 06:06)  POCT Blood Glucose.: 69 mg/dL (2019 06:04)  POCT Blood Glucose.: 139 mg/dL (2019 23:59)  POCT Blood Glucose.: 104 mg/dL (2019 17:06)      I&O's Summary    2019 07:01  -  2019 07:00  --------------------------------------------------------  IN: 3390 mL / OUT: 750 mL / NET: 2640 mL    2019 07:01  -  2019 15:19  --------------------------------------------------------  IN: 730 mL / OUT: 0 mL / NET: 730 mL      PHYSICAL EXAM:  GEN: A&O X 0 , NAD, comfortable  HEENT: NCAT, PERRL, MMM, no scleral icterus, hearing intact  NECK: Supple, No JVD  CVS: S1S2 , tachycardic, No M/R/G appreciated  PULM: CTA B/L,  diminished at bases bilaterally   ABD.: soft. non tender, non distended  Extrem: intact pulses , no edema noted  Derm: stage 4 sacral decubitis ulcer and ulcer at right hip noted       LABS:    ABG - ( 2019 07:19 )  pH, Arterial: 7.34  pH, Blood: x     /  pCO2: 38    /  pO2: 112   / HCO3: 20    / Base Excess: -5.1  /  SaO2: 98                   8.8    33.47 )-----------( 412      ( 2019 09:42 )             29.6     02    142  |  109<H>  |  74<H>  ----------------------------<  203<H>  3.6   |  17<L>  |  1.04    Ca    8.2<L>      2019 07:16    TPro  6.9  /  Alb  3.7  /  TBili  0.3  /  DBili  x   /  AST  23  /  ALT  18  /  AlkPhos  92  -    LIVER FUNCTIONS - ( 2019 21:23 )  Alb: 3.7 g/dL / Pro: 6.9 g/dL / ALK PHOS: 92 U/L / ALT: 18 U/L / AST: 23 U/L / GGT: x           PT/INR - ( 2019 21:23 )   PT: 18.6 sec;   INR: 1.59 ratio      PTT - ( 2019 21:23 )  PTT:44.0 sec    Urinalysis Basic - ( 2019 21:59 )    Color: Yellow / Appearance: Clear / S.023 / pH: x  Gluc: x / Ketone: Negative  / Bili: Negative / Urobili: Negative   Blood: x / Protein: 30 mg/dL / Nitrite: Negative   Leuk Esterase: Large / RBC: 17 /hpf / WBC 27 /HPF   Sq Epi: x / Non Sq Epi: 0 /hpf / Bacteria: Many      IMAGIN/22 CT A/P:    LOWER CHEST: Cardiomegaly.  GALLBLADDER: Cholecystectomy.  BLADDER: Collapsed with a Blackman catheter.  REPRODUCTIVE ORGANS: Limited evaluation secondary to extensive streak   artifact from right lower extremity hardware  BOWEL: Gastrostomy balloon within the stomach. No bowel obstruction.   Appendix is not visualized.  VESSELS:  Atheromatous disease of the abdominal aorta.  ABDOMINAL WALL: Anasarca.  BONES: Degenerative changes of the spine. Complete vertebral body height   loss of L2. Total right hip arthroplasty. Chronic bilateral rib fractures   and left acetabular fracture. Mild anterolisthesis of L4 over L5,   unchanged compared to prior exam of 2018.    IMPRESSION:  No acute intra-abdominal pathology or collection.       CT Chest:    LUNGS AND LARGE AIRWAYS: Limited evaluation secondary to respiratory   motion artifact. Significant debris is noted within the right main, left   main, and left lower lobe bronchi. Interlobular septal thickening with   diffuse patchy groundglass opacities.  PLEURA: Small bilateral pleural effusions with associated passive   atelectasis.  VESSELS: Atherosclerotic disease of the aorta and coronary arteries.  HEART: Stable cardiomegaly. No pericardial effusion.  MEDIASTINUM AND DILLAN: The esophagus is filled with debris. No   lymphadenopathy.  CHEST WALL AND LOWER NECK: Anasarca.  VISUALIZED UPPER ABDOMEN: Hepatomegaly.  BONES: Chronic mild anterior T3 compression deformity. Spinal   degenerative changes.    IMPRESSION:     Debris-filled esophagus with secretions in the right main, left main, and   left lower lobar bronchi likely secondary to aspiration.    Moderate pulmonary edema with small bilateral pleural effusions.   Underlying pneumonia cannot be excluded. MICU ACCEPTANCE NOTE    68y F with pmhx of advanced dementia (AOX0 at baseline), bedbound with stage 4 sacral decubitus ulcer presented to the hospital with fevers on , found to be septic of unclear etiology. RRT called for SBP 60's, rest of VS wnl, last documented fever of 100.4 at 5am. Patient had been receiving 75cc/hr of NS in setting of HFrEF (EF 20%), labs notable for leukocytosis in 30's with left shift. She was placed on meropenem for ABX coverage in addition to daptomycin. During RRT patient received 1L NS bolus with pressures improved to 90/63. No change in respiratory status noted, satting 100% on RA.  Patient was put on levophed at a rate of .5 mcg/kg/min with significant improvement in hypotension.  Patient brought to the MICU for further management.  On bedside POCUS, patient has diffuse B lines with pleural thickening on the left lung.      MEDICATIONS:  MEDICATIONS  (STANDING):  ALBUTerol    90 MICROgram(s) HFA Inhaler 1 Puff(s) Inhalation every 4 hours  ALBUTerol/ipratropium for Nebulization 3 milliLiter(s) Nebulizer every 6 hours  apixaban 5 milliGRAM(s) Oral every 12 hours  clonazePAM Tablet 1.5 milliGRAM(s) Oral at bedtime  Dakins Solution - 1/4 Strength 1 Application(s) Topical two times a day  DAPTOmycin IVPB 210 milliGRAM(s) IV Intermittent every 48 hours  dextrose 5%. 1000 milliLiter(s) (50 mL/Hr) IV Continuous <Continuous>  dextrose 50% Injectable 12.5 Gram(s) IV Push once  dextrose 50% Injectable 25 Gram(s) IV Push once  dextrose 50% Injectable 25 Gram(s) IV Push once  diphenhydrAMINE   Injectable 50 milliGRAM(s) IV Push once  famotidine    Tablet 20 milliGRAM(s) Oral daily  ferrous    sulfate Liquid 300 milliGRAM(s) Oral daily  gabapentin   Solution 300 milliGRAM(s) Oral two times a day  insulin lispro (HumaLOG) corrective regimen sliding scale   SubCutaneous every 6 hours  Medical Marijuana Oil 1 milliLiter(s) 1 milliLiter(s) SubLingual <User Schedule>  meropenem  IVPB 1000 milliGRAM(s) IV Intermittent every 8 hours  micafungin IVPB 100 milliGRAM(s) IV Intermittent every 24 hours  norepinephrine Infusion 0.05 MICROgram(s)/kG/Min (3.356 mL/Hr) IV Continuous <Continuous>  predniSONE   Tablet 7.5 milliGRAM(s) Oral daily  QUEtiapine 25 milliGRAM(s) Oral at bedtime  sodium chloride 0.9%. 1000 milliLiter(s) (70 mL/Hr) IV Continuous <Continuous>  tiotropium 18 MICROgram(s) Capsule 1 Capsule(s) Inhalation daily  tiZANidine 4 milliGRAM(s) Oral <User Schedule>    MEDICATIONS  (PRN):  acetaminophen    Suspension .. 650 milliGRAM(s) Oral every 6 hours PRN Temp greater or equal to 38C (100.4F)  dextrose 40% Gel 15 Gram(s) Oral once PRN Blood Glucose LESS THAN 70 milliGRAM(s)/deciliter  glucagon  Injectable 1 milliGRAM(s) IntraMuscular once PRN Glucose LESS THAN 70 milligrams/deciliter      OBJECTIVE:  ICU Vital Signs Last 24 Hrs  T(C): 36.9 (2019 14:45), Max: 39.3 (2019 03:01)  T(F): 98.5 (2019 14:45), Max: 102.8 (2019 03:01)  HR: 112 (2019 14:45) (98 - 112)  BP: 64/53 (2019 14:45) (64/53 - 115/67)  BP(mean): --  ABP: --  ABP(mean): --  RR: 18 (2019 07:14) (18 - 18)  SpO2: 95% (2019 07:14) (94% - 95%)      CAPILLARY BLOOD GLUCOSE    POCT Blood Glucose.: 180 mg/dL (2019 13:40)  POCT Blood Glucose.: 56 mg/dL (2019 13:22)  POCT Blood Glucose.: 57 mg/dL (2019 13:21)  POCT Blood Glucose.: 79 mg/dL (2019 12:43)  POCT Blood Glucose.: 73 mg/dL (2019 06:40)  POCT Blood Glucose.: 51 mg/dL (2019 06:39)  POCT Blood Glucose.: 90 mg/dL (2019 06:06)  POCT Blood Glucose.: 69 mg/dL (2019 06:04)  POCT Blood Glucose.: 139 mg/dL (2019 23:59)  POCT Blood Glucose.: 104 mg/dL (2019 17:06)      I&O's Summary    2019 07:01  -  2019 07:00  --------------------------------------------------------  IN: 3390 mL / OUT: 750 mL / NET: 2640 mL    2019 07:01  -  2019 15:19  --------------------------------------------------------  IN: 730 mL / OUT: 0 mL / NET: 730 mL      PHYSICAL EXAM:  GEN: A&O X 0 , NAD, comfortable  HEENT: NCAT, PERRL, MMM, no scleral icterus, hearing intact  NECK: Supple, No JVD  CVS: S1S2 , tachycardic, No M/R/G appreciated  PULM: CTA B/L,  diminished at bases bilaterally   ABD.: soft. non tender, non distended  Extrem: intact pulses , no edema noted  Derm: stage 4 sacral decubitis ulcer and ulcer at right hip noted       LABS:    ABG - ( 2019 07:19 )  pH, Arterial: 7.34  pH, Blood: x     /  pCO2: 38    /  pO2: 112   / HCO3: 20    / Base Excess: -5.1  /  SaO2: 98                   8.8    33.47 )-----------( 412      ( 2019 09:42 )             29.6     02    142  |  109<H>  |  74<H>  ----------------------------<  203<H>  3.6   |  17<L>  |  1.04    Ca    8.2<L>      2019 07:16    TPro  6.9  /  Alb  3.7  /  TBili  0.3  /  DBili  x   /  AST  23  /  ALT  18  /  AlkPhos  92      LIVER FUNCTIONS - ( 2019 21:23 )  Alb: 3.7 g/dL / Pro: 6.9 g/dL / ALK PHOS: 92 U/L / ALT: 18 U/L / AST: 23 U/L / GGT: x           PT/INR - ( 2019 21:23 )   PT: 18.6 sec;   INR: 1.59 ratio      PTT - ( 2019 21:23 )  PTT:44.0 sec    Urinalysis Basic - ( 2019 21:59 )    Color: Yellow / Appearance: Clear / S.023 / pH: x  Gluc: x / Ketone: Negative  / Bili: Negative / Urobili: Negative   Blood: x / Protein: 30 mg/dL / Nitrite: Negative   Leuk Esterase: Large / RBC: 17 /hpf / WBC 27 /HPF   Sq Epi: x / Non Sq Epi: 0 /hpf / Bacteria: Many      IMAGIN/26 CXR:    The lungs are clear. No pleural effusion or pneumothorax.  Cardiac size cannot be adequately assessed on this projection.  There are no acute osseous abnormalities.      IMPRESSION:   Clear lungs.       CT A/P:    LOWER CHEST: Cardiomegaly.  GALLBLADDER: Cholecystectomy.  BLADDER: Collapsed with a Blackman catheter.  REPRODUCTIVE ORGANS: Limited evaluation secondary to extensive streak   artifact from right lower extremity hardware  BOWEL: Gastrostomy balloon within the stomach. No bowel obstruction.   Appendix is not visualized.  VESSELS:  Atheromatous disease of the abdominal aorta.  ABDOMINAL WALL: Anasarca.  BONES: Degenerative changes of the spine. Complete vertebral body height   loss of L2. Total right hip arthroplasty. Chronic bilateral rib fractures   and left acetabular fracture. Mild anterolisthesis of L4 over L5,   unchanged compared to prior exam of 2018.    IMPRESSION:  No acute intra-abdominal pathology or collection.       CT Chest:      LUNGS AND LARGE AIRWAYS: Patent central airways. Left basilar atelectasis.  PLEURA: No pleural effusion.  VESSELS: Atheromatous disease of thethoracic aorta.  HEART: Cardiomegaly.No pericardial effusion. Aortic valve and coronary   artery calcifications.  MEDIASTINUM AND DILLAN: No lymphadenopathy.  CHEST WALL AND LOWER NECK: Within normal limits.  VISUALIZED UPPER ABDOMEN: Cholecystectomy.  BONES: Complete vertebral body height loss of L2. Chronic bilateral rib   fractures.    IMPRESSION:   No pneumonia.      12/3 TTE:  LVEF 26%  1. Mitral annular calcification, otherwise normal mitral  valve. Moderate-severe mitral regurgitation (vena contracta  0.8 cm)  2. Calcified trileafletaortic valve with normal opening.  Peak transaortic valve gradient equals 3 mm Hg, mean  transaortic valve gradient equals 1 mm Hg, aortic valve  velocity time integral equals 13 cm. Moderate aortic  regurgitation  3. Moderately dilated left atrium.LA volume index = 45  cc/m2.  4. Severe global left ventricular systolic dysfunction.  5. Increased E/e'  is consistent with elevated left  ventricular filling pressure.  6. Moderate right atrial enlargement. Inferior vena cava is  dilated (>=2.5cm) with normal respiratory variability  consistent with right atrial pressure 16-20mm Hg.  7. Right ventricular enlargement with decreased right  ventricular systolic function.  8. Normal tricuspid valve. Severe tricuspid regurgitation.  9. Estimated pulmonary artery systolic pressure equals 82  mm Hg, assuming right atrial pressure equals 8 mm Hg,  consistent with severe pulmonary pressures.  10. Color Doppler demonstrates evidence of left to right  shunt

## 2019-03-01 LAB
-  AMIKACIN: SIGNIFICANT CHANGE UP
-  AMPICILLIN/SULBACTAM: SIGNIFICANT CHANGE UP
-  AMPICILLIN: SIGNIFICANT CHANGE UP
-  AMPICILLIN: SIGNIFICANT CHANGE UP
-  AZTREONAM: SIGNIFICANT CHANGE UP
-  CEFAZOLIN: SIGNIFICANT CHANGE UP
-  CEFEPIME: SIGNIFICANT CHANGE UP
-  CEFOXITIN: SIGNIFICANT CHANGE UP
-  CEFTRIAXONE: SIGNIFICANT CHANGE UP
-  CIPROFLOXACIN: SIGNIFICANT CHANGE UP
-  CIPROFLOXACIN: SIGNIFICANT CHANGE UP
-  ERTAPENEM: SIGNIFICANT CHANGE UP
-  GENTAMICIN: SIGNIFICANT CHANGE UP
-  IMIPENEM: SIGNIFICANT CHANGE UP
-  LEVOFLOXACIN: SIGNIFICANT CHANGE UP
-  LEVOFLOXACIN: SIGNIFICANT CHANGE UP
-  MEROPENEM: SIGNIFICANT CHANGE UP
-  NITROFURANTOIN: SIGNIFICANT CHANGE UP
-  NITROFURANTOIN: SIGNIFICANT CHANGE UP
-  PIPERACILLIN/TAZOBACTAM: SIGNIFICANT CHANGE UP
-  TETRACYCLINE: SIGNIFICANT CHANGE UP
-  TIGECYCLINE: SIGNIFICANT CHANGE UP
-  TOBRAMYCIN: SIGNIFICANT CHANGE UP
-  TRIMETHOPRIM/SULFAMETHOXAZOLE: SIGNIFICANT CHANGE UP
-  VANCOMYCIN: SIGNIFICANT CHANGE UP
ALBUMIN SERPL ELPH-MCNC: 2.6 G/DL — LOW (ref 3.3–5)
ALBUMIN SERPL ELPH-MCNC: 2.7 G/DL — LOW (ref 3.3–5)
ALP SERPL-CCNC: 79 U/L — SIGNIFICANT CHANGE UP (ref 40–120)
ALP SERPL-CCNC: 84 U/L — SIGNIFICANT CHANGE UP (ref 40–120)
ALT FLD-CCNC: 16 U/L — SIGNIFICANT CHANGE UP (ref 10–45)
ALT FLD-CCNC: 17 U/L — SIGNIFICANT CHANGE UP (ref 10–45)
ANION GAP SERPL CALC-SCNC: 12 MMOL/L — SIGNIFICANT CHANGE UP (ref 5–17)
ANION GAP SERPL CALC-SCNC: 15 MMOL/L — SIGNIFICANT CHANGE UP (ref 5–17)
APTT BLD: 37 SEC — HIGH (ref 27.5–36.3)
AST SERPL-CCNC: 20 U/L — SIGNIFICANT CHANGE UP (ref 10–40)
AST SERPL-CCNC: 21 U/L — SIGNIFICANT CHANGE UP (ref 10–40)
BILIRUB SERPL-MCNC: 0.1 MG/DL — LOW (ref 0.2–1.2)
BILIRUB SERPL-MCNC: 0.1 MG/DL — LOW (ref 0.2–1.2)
BLD GP AB SCN SERPL QL: NEGATIVE — SIGNIFICANT CHANGE UP
BUN SERPL-MCNC: 76 MG/DL — HIGH (ref 7–23)
BUN SERPL-MCNC: 76 MG/DL — HIGH (ref 7–23)
CA-I BLD-SCNC: 1.23 MMOL/L — SIGNIFICANT CHANGE UP (ref 1.12–1.3)
CALCIUM SERPL-MCNC: 8.8 MG/DL — SIGNIFICANT CHANGE UP (ref 8.4–10.5)
CALCIUM SERPL-MCNC: 8.9 MG/DL — SIGNIFICANT CHANGE UP (ref 8.4–10.5)
CHLORIDE SERPL-SCNC: 111 MMOL/L — HIGH (ref 96–108)
CHLORIDE SERPL-SCNC: 112 MMOL/L — HIGH (ref 96–108)
CK SERPL-CCNC: 206 U/L — HIGH (ref 25–170)
CO2 SERPL-SCNC: 15 MMOL/L — LOW (ref 22–31)
CO2 SERPL-SCNC: 17 MMOL/L — LOW (ref 22–31)
CREAT SERPL-MCNC: 0.84 MG/DL — SIGNIFICANT CHANGE UP (ref 0.5–1.3)
CREAT SERPL-MCNC: 0.98 MG/DL — SIGNIFICANT CHANGE UP (ref 0.5–1.3)
CULTURE RESULTS: SIGNIFICANT CHANGE UP
GAS PNL BLDA: SIGNIFICANT CHANGE UP
GAS PNL BLDV: SIGNIFICANT CHANGE UP
GLUCOSE BLDC GLUCOMTR-MCNC: 191 MG/DL — HIGH (ref 70–99)
GLUCOSE BLDC GLUCOMTR-MCNC: 57 MG/DL — LOW (ref 70–99)
GLUCOSE BLDC GLUCOMTR-MCNC: 65 MG/DL — LOW (ref 70–99)
GLUCOSE BLDC GLUCOMTR-MCNC: 71 MG/DL — SIGNIFICANT CHANGE UP (ref 70–99)
GLUCOSE SERPL-MCNC: 130 MG/DL — HIGH (ref 70–99)
GLUCOSE SERPL-MCNC: 153 MG/DL — HIGH (ref 70–99)
HCT VFR BLD CALC: 27.6 % — LOW (ref 34.5–45)
HGB BLD-MCNC: 9.5 G/DL — LOW (ref 11.5–15.5)
INR BLD: 1.4 RATIO — HIGH (ref 0.88–1.16)
LEGIONELLA AG UR QL: NEGATIVE — SIGNIFICANT CHANGE UP
MAGNESIUM SERPL-MCNC: 2.2 MG/DL — SIGNIFICANT CHANGE UP (ref 1.6–2.6)
MCHC RBC-ENTMCNC: 31 PG — SIGNIFICANT CHANGE UP (ref 27–34)
MCHC RBC-ENTMCNC: 34.3 GM/DL — SIGNIFICANT CHANGE UP (ref 32–36)
MCV RBC AUTO: 90.3 FL — SIGNIFICANT CHANGE UP (ref 80–100)
METHOD TYPE: SIGNIFICANT CHANGE UP
METHOD TYPE: SIGNIFICANT CHANGE UP
ORGANISM # SPEC MICROSCOPIC CNT: SIGNIFICANT CHANGE UP
PHOSPHATE SERPL-MCNC: 2.7 MG/DL — SIGNIFICANT CHANGE UP (ref 2.5–4.5)
PLATELET # BLD AUTO: 566 K/UL — HIGH (ref 150–400)
POTASSIUM SERPL-MCNC: 4.2 MMOL/L — SIGNIFICANT CHANGE UP (ref 3.5–5.3)
POTASSIUM SERPL-MCNC: 4.8 MMOL/L — SIGNIFICANT CHANGE UP (ref 3.5–5.3)
POTASSIUM SERPL-SCNC: 4.2 MMOL/L — SIGNIFICANT CHANGE UP (ref 3.5–5.3)
POTASSIUM SERPL-SCNC: 4.8 MMOL/L — SIGNIFICANT CHANGE UP (ref 3.5–5.3)
PROT SERPL-MCNC: 5.4 G/DL — LOW (ref 6–8.3)
PROT SERPL-MCNC: 5.5 G/DL — LOW (ref 6–8.3)
PROTHROM AB SERPL-ACNC: 16.3 SEC — HIGH (ref 10–12.9)
RBC # BLD: 3.06 M/UL — LOW (ref 3.8–5.2)
RBC # FLD: 13 % — SIGNIFICANT CHANGE UP (ref 10.3–14.5)
RH IG SCN BLD-IMP: NEGATIVE — SIGNIFICANT CHANGE UP
SODIUM SERPL-SCNC: 141 MMOL/L — SIGNIFICANT CHANGE UP (ref 135–145)
SODIUM SERPL-SCNC: 141 MMOL/L — SIGNIFICANT CHANGE UP (ref 135–145)
SPECIMEN SOURCE: SIGNIFICANT CHANGE UP
URATE SERPL-MCNC: 6.6 MG/DL — SIGNIFICANT CHANGE UP (ref 2.5–7)
WBC # BLD: 36.5 K/UL — HIGH (ref 3.8–10.5)
WBC # FLD AUTO: 36.5 K/UL — HIGH (ref 3.8–10.5)

## 2019-03-01 PROCEDURE — 71250 CT THORAX DX C-: CPT | Mod: 26

## 2019-03-01 PROCEDURE — 71045 X-RAY EXAM CHEST 1 VIEW: CPT | Mod: 26

## 2019-03-01 PROCEDURE — 99291 CRITICAL CARE FIRST HOUR: CPT | Mod: 25

## 2019-03-01 PROCEDURE — 36556 INSERT NON-TUNNEL CV CATH: CPT | Mod: GC

## 2019-03-01 RX ORDER — DEXTROSE 50 % IN WATER 50 %
50 SYRINGE (ML) INTRAVENOUS ONCE
Qty: 0 | Refills: 0 | Status: COMPLETED | OUTPATIENT
Start: 2019-03-01 | End: 2019-03-02

## 2019-03-01 RX ORDER — TIGECYCLINE 50 MG/5ML
INJECTION, POWDER, LYOPHILIZED, FOR SOLUTION INTRAVENOUS
Qty: 0 | Refills: 0 | Status: DISCONTINUED | OUTPATIENT
Start: 2019-03-01 | End: 2019-03-03

## 2019-03-01 RX ORDER — TIGECYCLINE 50 MG/5ML
100 INJECTION, POWDER, LYOPHILIZED, FOR SOLUTION INTRAVENOUS ONCE
Qty: 0 | Refills: 0 | Status: COMPLETED | OUTPATIENT
Start: 2019-03-01 | End: 2019-03-01

## 2019-03-01 RX ORDER — DEXTROSE 50 % IN WATER 50 %
25 SYRINGE (ML) INTRAVENOUS ONCE
Qty: 0 | Refills: 0 | Status: DISCONTINUED | OUTPATIENT
Start: 2019-03-01 | End: 2019-03-01

## 2019-03-01 RX ORDER — TIGECYCLINE 50 MG/5ML
50 INJECTION, POWDER, LYOPHILIZED, FOR SOLUTION INTRAVENOUS EVERY 12 HOURS
Qty: 0 | Refills: 0 | Status: DISCONTINUED | OUTPATIENT
Start: 2019-03-02 | End: 2019-03-03

## 2019-03-01 RX ORDER — MEROPENEM 1 G/30ML
1000 INJECTION INTRAVENOUS EVERY 12 HOURS
Qty: 0 | Refills: 0 | Status: DISCONTINUED | OUTPATIENT
Start: 2019-03-01 | End: 2019-03-06

## 2019-03-01 RX ORDER — SODIUM CHLORIDE 9 MG/ML
250 INJECTION INTRAMUSCULAR; INTRAVENOUS; SUBCUTANEOUS ONCE
Qty: 0 | Refills: 0 | Status: COMPLETED | OUTPATIENT
Start: 2019-03-01 | End: 2019-03-01

## 2019-03-01 RX ORDER — SODIUM CHLORIDE 9 MG/ML
500 INJECTION INTRAMUSCULAR; INTRAVENOUS; SUBCUTANEOUS ONCE
Qty: 0 | Refills: 0 | Status: COMPLETED | OUTPATIENT
Start: 2019-03-01 | End: 2019-03-01

## 2019-03-01 RX ADMIN — FAMOTIDINE 20 MILLIGRAM(S): 10 INJECTION INTRAVENOUS at 13:18

## 2019-03-01 RX ADMIN — Medication 7.5 MILLIGRAM(S): at 05:59

## 2019-03-01 RX ADMIN — Medication 1 APPLICATION(S): at 05:59

## 2019-03-01 RX ADMIN — GABAPENTIN 300 MILLIGRAM(S): 400 CAPSULE ORAL at 17:34

## 2019-03-01 RX ADMIN — Medication 1 APPLICATION(S): at 17:02

## 2019-03-01 RX ADMIN — MEROPENEM 100 MILLIGRAM(S): 1 INJECTION INTRAVENOUS at 17:34

## 2019-03-01 RX ADMIN — SODIUM CHLORIDE 500 MILLILITER(S): 9 INJECTION INTRAMUSCULAR; INTRAVENOUS; SUBCUTANEOUS at 17:03

## 2019-03-01 RX ADMIN — Medication 1: at 10:24

## 2019-03-01 RX ADMIN — Medication 300 MILLIGRAM(S): at 13:18

## 2019-03-01 RX ADMIN — MIDODRINE HYDROCHLORIDE 10 MILLIGRAM(S): 2.5 TABLET ORAL at 13:18

## 2019-03-01 RX ADMIN — QUETIAPINE FUMARATE 25 MILLIGRAM(S): 200 TABLET, FILM COATED ORAL at 21:38

## 2019-03-01 RX ADMIN — MIDODRINE HYDROCHLORIDE 10 MILLIGRAM(S): 2.5 TABLET ORAL at 06:00

## 2019-03-01 RX ADMIN — SODIUM CHLORIDE 1000 MILLILITER(S): 9 INJECTION INTRAMUSCULAR; INTRAVENOUS; SUBCUTANEOUS at 03:48

## 2019-03-01 RX ADMIN — MIDODRINE HYDROCHLORIDE 10 MILLIGRAM(S): 2.5 TABLET ORAL at 21:38

## 2019-03-01 RX ADMIN — APIXABAN 5 MILLIGRAM(S): 2.5 TABLET, FILM COATED ORAL at 05:59

## 2019-03-01 RX ADMIN — Medication 1: at 17:13

## 2019-03-01 RX ADMIN — GABAPENTIN 300 MILLIGRAM(S): 400 CAPSULE ORAL at 05:58

## 2019-03-01 RX ADMIN — TIGECYCLINE 110 MILLIGRAM(S): 50 INJECTION, POWDER, LYOPHILIZED, FOR SOLUTION INTRAVENOUS at 18:41

## 2019-03-01 RX ADMIN — Medication 650 MILLIGRAM(S): at 00:00

## 2019-03-01 RX ADMIN — MEROPENEM 100 MILLIGRAM(S): 1 INJECTION INTRAVENOUS at 06:00

## 2019-03-01 RX ADMIN — MEROPENEM 100 MILLIGRAM(S): 1 INJECTION INTRAVENOUS at 13:18

## 2019-03-01 RX ADMIN — APIXABAN 5 MILLIGRAM(S): 2.5 TABLET, FILM COATED ORAL at 17:34

## 2019-03-01 RX ADMIN — Medication 1.5 MILLIGRAM(S): at 21:38

## 2019-03-01 NOTE — PROCEDURE NOTE - PROCEDURE
<<-----Click on this checkbox to enter Procedure Central line placement  03/01/2019    Active  [id number]

## 2019-03-01 NOTE — CHART NOTE - NSCHARTNOTEFT_GEN_A_CORE
Nutrition Follow Up Note    Patient seen for malnutrition follow up. 68y F with PMH of HFrEF (EF 20%), advanced dementia, bedbound with stage 4 sacral and R hip decubitus ulcer c/b R hip MRSA infection s/p PEG and chronic gavin, presumed asthma on steroids, multiple recent prolonged hospitalizations, presented to the hospital on  with fevers to 103 as well as cough and loose BMs, found to be septic likely 2/2 UTI vs sacral ulcer vs PNA, course c/b septic shock with SBP 60's now on norepinephrine.    Source:     Diet : NPO with tube feed via PEG glucerna 1.2 bolus feeds of 240 mls every 6 hours with prosource gelatein 1 x daily    Patient reports:     PO intake :     Source for PO intake:     Enteral /Parenteral Nutrition:       Daily Weight in k.7 (02-27)  % Weight Change    Pertinent Medications: MEDICATIONS  (STANDING):  ALBUTerol    90 MICROgram(s) HFA Inhaler 1 Puff(s) Inhalation every 4 hours  apixaban 5 milliGRAM(s) Oral every 12 hours  clonazePAM Tablet 1.5 milliGRAM(s) Oral at bedtime  Dakins Solution - 1/4 Strength 1 Application(s) Topical two times a day  DAPTOmycin IVPB 210 milliGRAM(s) IV Intermittent every 48 hours  famotidine    Tablet 20 milliGRAM(s) Oral daily  ferrous    sulfate Liquid 300 milliGRAM(s) Oral daily  gabapentin   Solution 300 milliGRAM(s) Oral two times a day  insulin lispro (HumaLOG) corrective regimen sliding scale   SubCutaneous three times a day before meals  insulin lispro (HumaLOG) corrective regimen sliding scale   SubCutaneous at bedtime  Medical Marijuana Oil 1 milliLiter(s),Medical Marijuana Oil 1 milliLiter(s),Medical Marijuana Oil 1 milliLiter(s) 1 milliLiter(s) Oral <User Schedule>  meropenem  IVPB 1000 milliGRAM(s) IV Intermittent every 8 hours  micafungin IVPB 100 milliGRAM(s) IV Intermittent every 24 hours  midodrine 10 milliGRAM(s) Oral three times a day  phenylephrine    Infusion 1 MICROgram(s)/kG/Min (13.425 mL/Hr) IV Continuous <Continuous>  predniSONE   Tablet 7.5 milliGRAM(s) Oral daily  QUEtiapine 25 milliGRAM(s) Oral at bedtime  tiotropium 18 MICROgram(s) Capsule 1 Capsule(s) Inhalation daily    MEDICATIONS  (PRN):  acetaminophen    Suspension .. 650 milliGRAM(s) Oral every 6 hours PRN Temp greater or equal to 38C (100.4F)    Pertinent Labs:  @ 01:47: Na 141, BUN 76<H>, Cr 0.98, <H>, K+ 4.2, Phos 2.7, Mg 2.2, Alk Phos 79, ALT/SGPT 17, AST/SGOT 21, HbA1c --    Finger Sticks:  POCT Blood Glucose.: 191 mg/dL ( @ 10:21)  POCT Blood Glucose.: 109 mg/dL ( @ 22:01)  POCT Blood Glucose.: 180 mg/dL ( @ 13:40)  POCT Blood Glucose.: 56 mg/dL ( @ 13:22)  POCT Blood Glucose.: 57 mg/dL ( @ 13:21)  POCT Blood Glucose.: 79 mg/dL ( @ 12:43)      Skin per nursing documentation:   Edema:    Estimated Needs:   [ ] no change since previous assessment  [ ] recalculated:     Previous Nutrition Diagnosis:   Nutrition Diagnosis is:    New Nutrition Diagnosis:  Related to:    As evidenced by:      Interventions:     Recommend  1)    Monitoring and Evaluation:     Continue to monitor Nutritional intake, Tolerance to diet prescription, weights, labs, skin integrity    RD remains available upon request and will follow up per protocol Nutrition Follow Up Note    Patient seen for malnutrition follow up. 68y F with PMH of HFrEF (EF 20%), advanced dementia, bedbound with stage 4 sacral and R hip decubitus ulcer c/b R hip MRSA infection s/p PEG and chronic gavin, presumed asthma on steroids, multiple recent prolonged hospitalizations, presented to the hospital on  with fevers to 103 as well as cough and loose BMs, found to be septic likely 2/2 UTI vs sacral ulcer vs PNA, course c/b septic shock with SBP 60's now on norepinephrine.    Source: EMR, RN, Past RD note,  at bedside    Diet : NPO with tube feed via PEG glucerna 1.2 bolus feeds of 240 mls every 6 hours with prosource gelatein 1 x daily (non formulary at [facility])  Glucerna 1.2 at 240 mls 4 x daily provides total volume of 960 mls, 1152 calories and 57 g of protein meeting 32 cals/kg and 1.6g/kg of protein     Patient nonverbal. RN reports pt tolerating tube feeds at goal. Still with persistent diarrhea, 6 BMs documented yesterday. As per , pt had dietitian come to the home to manage tube feeds and diarrhea, stated banatrol and wendi did not help and made diarrhea worse, pt was started on pro-stat at home which seemed to relieve diarrhea. (pro-stat closest to Pro-Source)    Daily Weight in k.7 ()    Pertinent Medications: MEDICATIONS  (STANDING):  ALBUTerol    90 MICROgram(s) HFA Inhaler 1 Puff(s) Inhalation every 4 hours  apixaban 5 milliGRAM(s) Oral every 12 hours  clonazePAM Tablet 1.5 milliGRAM(s) Oral at bedtime  Dakins Solution - 1/4 Strength 1 Application(s) Topical two times a day  DAPTOmycin IVPB 210 milliGRAM(s) IV Intermittent every 48 hours  famotidine    Tablet 20 milliGRAM(s) Oral daily  ferrous    sulfate Liquid 300 milliGRAM(s) Oral daily  gabapentin   Solution 300 milliGRAM(s) Oral two times a day  insulin lispro (HumaLOG) corrective regimen sliding scale   SubCutaneous three times a day before meals  insulin lispro (HumaLOG) corrective regimen sliding scale   SubCutaneous at bedtime  Medical Marijuana Oil 1 milliLiter(s),Medical Marijuana Oil 1 milliLiter(s),Medical Marijuana Oil 1 milliLiter(s) 1 milliLiter(s) Oral <User Schedule>  meropenem  IVPB 1000 milliGRAM(s) IV Intermittent every 8 hours  micafungin IVPB 100 milliGRAM(s) IV Intermittent every 24 hours  midodrine 10 milliGRAM(s) Oral three times a day  phenylephrine    Infusion 1 MICROgram(s)/kG/Min (13.425 mL/Hr) IV Continuous <Continuous>  predniSONE   Tablet 7.5 milliGRAM(s) Oral daily  QUEtiapine 25 milliGRAM(s) Oral at bedtime  tiotropium 18 MICROgram(s) Capsule 1 Capsule(s) Inhalation daily    MEDICATIONS  (PRN):  acetaminophen    Suspension .. 650 milliGRAM(s) Oral every 6 hours PRN Temp greater or equal to 38C (100.4F)    Pertinent Labs:  @ 01:47: Na 141, BUN 76<H>, Cr 0.98, <H>, K+ 4.2, Phos 2.7, Mg 2.2, Alk Phos 79, ALT/SGPT 17, AST/SGOT 21,     Finger Sticks:  POCT Blood Glucose.: 191 mg/dL ( @ 10:21)  POCT Blood Glucose.: 109 mg/dL ( @ 22:01)  POCT Blood Glucose.: 180 mg/dL ( @ 13:40)  POCT Blood Glucose.: 56 mg/dL ( @ 13:22)  POCT Blood Glucose.: 57 mg/dL ( @ 13:21)  POCT Blood Glucose.: 79 mg/dL ( @ 12:43)      Skin per nursing documentation: stage 3 pressure injury on R hip, stage 4 pressure injury on sacrum and stage 2 pressure injury on upper sacrum,  Edema: none noted    Estimated Needs:   [X ] no change since previous assessment  [ ] recalculated:     Previous Nutrition Diagnosis: severe malnutrition   Nutrition Diagnosis is: ongoing, being addressed with enteral nutrition and protein supplements        Interventions:     Recommend  1) Continue tube feed via PEG Glucerna 1.2 bolus feeds of 240 mls every 6 hours, provides total volume of 960 mls, 1152 calories and 57 g of protein meeting 32 cals/kg and 1.6g/kg of protein based on dosing wt of 35.8 kg  2) Add Pro-Source 1 x daily (provides additional 60 calories and 15 gm of protein) to provide 1212 cals (33 cals/kg) and 72 g of protein (2 g/kg)  3) Consider starting pt on imodium or Lomotil if diarrhea persists       Monitoring and Evaluation:     Continue to monitor Nutritional intake, Tolerance to diet prescription, weights, labs, skin integrity    RD remains available upon request and will follow up per protocol Nutrition Follow Up Note    Patient seen for malnutrition follow up. 68y F with PMH of HFrEF (EF 20%), advanced dementia, bedbound with stage 4 sacral and R hip decubitus ulcer c/b R hip MRSA infection s/p PEG and chronic gavin, presumed asthma on steroids, multiple recent prolonged hospitalizations, presented to the hospital on  with fevers to 103 as well as cough and loose BMs, found to be septic likely 2/2 UTI vs sacral ulcer vs PNA, course c/b septic shock with SBP 60's now on norepinephrine.    Source: EMR, RN, Past RD note,  at bedside    Diet : NPO with tube feed via PEG glucerna 1.2 bolus feeds of 240 mls every 6 hours (4 x daily) provides total volume of 960 mls, 1152 calories and 57 g of protein meeting 32 cals/kg and 1.6g/kg of protein     Patient nonverbal. RN reports pt tolerating tube feeds at goal. Still with persistent diarrhea, 6 BMs documented yesterday. As per , pt had dietitian come to the home to manage tube feeds and diarrhea, stated banatrol and wendi did not help and made diarrhea worse, pt was started on pro-stat (protein modular) at home which seemed to relieve diarrhea.    Daily Weight in k.7 (-)    Pertinent Medications: MEDICATIONS  (STANDING):  ALBUTerol    90 MICROgram(s) HFA Inhaler 1 Puff(s) Inhalation every 4 hours  apixaban 5 milliGRAM(s) Oral every 12 hours  clonazePAM Tablet 1.5 milliGRAM(s) Oral at bedtime  Dakins Solution - 1/4 Strength 1 Application(s) Topical two times a day  DAPTOmycin IVPB 210 milliGRAM(s) IV Intermittent every 48 hours  famotidine    Tablet 20 milliGRAM(s) Oral daily  ferrous    sulfate Liquid 300 milliGRAM(s) Oral daily  gabapentin   Solution 300 milliGRAM(s) Oral two times a day  insulin lispro (HumaLOG) corrective regimen sliding scale   SubCutaneous three times a day before meals  insulin lispro (HumaLOG) corrective regimen sliding scale   SubCutaneous at bedtime  Medical Marijuana Oil 1 milliLiter(s),Medical Marijuana Oil 1 milliLiter(s),Medical Marijuana Oil 1 milliLiter(s) 1 milliLiter(s) Oral <User Schedule>  meropenem  IVPB 1000 milliGRAM(s) IV Intermittent every 8 hours  micafungin IVPB 100 milliGRAM(s) IV Intermittent every 24 hours  midodrine 10 milliGRAM(s) Oral three times a day  phenylephrine    Infusion 1 MICROgram(s)/kG/Min (13.425 mL/Hr) IV Continuous <Continuous>  predniSONE   Tablet 7.5 milliGRAM(s) Oral daily  QUEtiapine 25 milliGRAM(s) Oral at bedtime  tiotropium 18 MICROgram(s) Capsule 1 Capsule(s) Inhalation daily    MEDICATIONS  (PRN):  acetaminophen    Suspension .. 650 milliGRAM(s) Oral every 6 hours PRN Temp greater or equal to 38C (100.4F)    Pertinent Labs:  @ 01:47: Na 141, BUN 76<H>, Cr 0.98, <H>, K+ 4.2, Phos 2.7, Mg 2.2, Alk Phos 79, ALT/SGPT 17, AST/SGOT 21,     Finger Sticks:  POCT Blood Glucose.: 191 mg/dL ( @ 10:21)  POCT Blood Glucose.: 109 mg/dL ( @ 22:01)  POCT Blood Glucose.: 180 mg/dL ( @ 13:40)  POCT Blood Glucose.: 56 mg/dL ( @ 13:22)  POCT Blood Glucose.: 57 mg/dL ( @ 13:21)  POCT Blood Glucose.: 79 mg/dL ( @ 12:43)      Skin per nursing documentation: stage 3 pressure injury on R hip, stage 4 pressure injury on sacrum and stage 2 pressure injury on upper sacrum,  Edema: none noted    Estimated Needs:   [X ] no change since previous assessment  [ ] recalculated:     Previous Nutrition Diagnosis: severe malnutrition   Nutrition Diagnosis is: ongoing, being addressed with enteral nutrition and protein supplements        Interventions:     Recommend  1) Continue tube feed via PEG Glucerna 1.2 bolus feeds of 240 mls every 6 hours, provides total volume of 960 mls, 1152 calories and 57 g of protein meeting 32 cals/kg and 1.6g/kg of protein based on dosing wt of 35.8 kg  2) Add Pro-Source 1 x daily (provides additional 60 calories and 15 gm of protein) to provide total of 1212 cals (33 cals/kg) and 72 g of protein (2 g/kg)  3) Consider starting pt on imodium or Lomotil as medically appropriate if diarrhea persists       Monitoring and Evaluation:     Continue to monitor Nutritional intake, Tolerance to diet prescription, weights, labs, skin integrity    RD remains available upon request and will follow up per protocol

## 2019-03-01 NOTE — PROGRESS NOTE ADULT - SUBJECTIVE AND OBJECTIVE BOX
CHIEF COMPLAINT: Patient is a 68y old  Female who presents with a chief complaint of Fevers for the past 2 days at home (2019 15:19)      INTERVAL HISTORY / SUBJECTIVE:  ***    MEDICATIONS:  MEDICATIONS  (STANDING):  ALBUTerol    90 MICROgram(s) HFA Inhaler 1 Puff(s) Inhalation every 4 hours  apixaban 5 milliGRAM(s) Oral every 12 hours  clonazePAM Tablet 1.5 milliGRAM(s) Oral at bedtime  Dakins Solution - 1/4 Strength 1 Application(s) Topical two times a day  DAPTOmycin IVPB 210 milliGRAM(s) IV Intermittent every 48 hours  famotidine    Tablet 20 milliGRAM(s) Oral daily  ferrous    sulfate Liquid 300 milliGRAM(s) Oral daily  gabapentin   Solution 300 milliGRAM(s) Oral two times a day  insulin lispro (HumaLOG) corrective regimen sliding scale   SubCutaneous three times a day before meals  insulin lispro (HumaLOG) corrective regimen sliding scale   SubCutaneous at bedtime  Medical Marijuana Oil 1 milliLiter(s),Medical Marijuana Oil 1 milliLiter(s),Medical Marijuana Oil 1 milliLiter(s) 1 milliLiter(s) Oral <User Schedule>  meropenem  IVPB 1000 milliGRAM(s) IV Intermittent every 8 hours  micafungin IVPB 100 milliGRAM(s) IV Intermittent every 24 hours  midodrine 10 milliGRAM(s) Oral three times a day  phenylephrine    Infusion 1 MICROgram(s)/kG/Min (13.425 mL/Hr) IV Continuous <Continuous>  predniSONE   Tablet 7.5 milliGRAM(s) Oral daily  QUEtiapine 25 milliGRAM(s) Oral at bedtime  tiotropium 18 MICROgram(s) Capsule 1 Capsule(s) Inhalation daily    MEDICATIONS  (PRN):  acetaminophen    Suspension .. 650 milliGRAM(s) Oral every 6 hours PRN Temp greater or equal to 38C (100.4F)      OBJECTIVE:  ICU Vital Signs Last 24 Hrs  T(C): 36.7 (01 Mar 2019 05:00), Max: 39.1 (2019 23:15)  T(F): 98 (01 Mar 2019 05:00), Max: 102.4 (2019 23:15)  HR: 105 (01 Mar 2019 05:30) (96 - 122)  BP: 109/56 (01 Mar 2019 05:30) (60/50 - 134/59)  BP(mean): 82 (01 Mar 2019 05:30) (52 - 92)  ABP: --  ABP(mean): --  RR: 21 (01 Mar 2019 05:30) (17 - 36)  SpO2: 100% (01 Mar 2019 05:30) (95% - 100%)      CAPILLARY BLOOD GLUCOSE    POCT Blood Glucose.: 109 mg/dL (2019 22:01)  POCT Blood Glucose.: 180 mg/dL (2019 13:40)  POCT Blood Glucose.: 56 mg/dL (2019 13:22)  POCT Blood Glucose.: 57 mg/dL (2019 13:21)  POCT Blood Glucose.: 79 mg/dL (2019 12:43)  POCT Blood Glucose.: 73 mg/dL (2019 06:40)  POCT Blood Glucose.: 51 mg/dL (2019 06:39)      I&O's Summary    2019 07:01  -  2019 07:00  --------------------------------------------------------  IN: 3390 mL / OUT: 750 mL / NET: 2640 mL    2019 07:01  -  01 Mar 2019 06:32  --------------------------------------------------------  IN: 3214.4 mL / OUT: 250 mL / NET: 2964.4 mL        PHYSICAL EXAM:  GEN: A&O X 0 , NAD, comfortable  HEENT: NCAT, PERRL, MMM, no scleral icterus, hearing intact  NECK: Supple, No JVD  CVS: S1S2 , tachycardic, No M/R/G appreciated  PULM: CTA B/L,  diminished at bases bilaterally   ABD.: soft. non tender, non distended  Extrem: intact pulses , no edema noted  Derm: stage 4 sacral decubitis ulcer and ulcer at right hip noted       LABS:    ABG - ( 01 Mar 2019 01:40 )  pH, Arterial: 7.39  pH, Blood: x     /  pCO2: 31    /  pO2: 81    / HCO3: 18    / Base Excess: -5.2  /  SaO2: 97                            9.5    36.5  )-----------( 566      ( 01 Mar 2019 01:47 )             27.6     03-    141  |  111<H>  |  76<H>  ----------------------------<  130<H>  4.2   |  15<L>  |  0.98    Ca    8.8      01 Mar 2019 01:47  Phos  2.7     03-  Mg     2.2     -    TPro  5.5<L>  /  Alb  2.7<L>  /  TBili  0.1<L>  /  DBili  x   /  AST  21  /  ALT  17  /  AlkPhos  79      LIVER FUNCTIONS - ( 01 Mar 2019 01:47 )  Alb: 2.7 g/dL / Pro: 5.5 g/dL / ALK PHOS: 79 U/L / ALT: 17 U/L / AST: 21 U/L / GGT: x           PT/INR - ( 01 Mar 2019 01:47 )   PT: 16.3 sec;   INR: 1.40 ratio         PTT - ( 01 Mar 2019 01:47 )  PTT:37.0 sec    Urinalysis Basic - ( 2019 18:28 )    Color: Yellow / Appearance: Slightly Turbid / S.025 / pH: x  Gluc: x / Ketone: Trace  / Bili: Negative / Urobili: 2 mg/dL   Blood: x / Protein: 100 / Nitrite: Negative   Leuk Esterase: Large / RBC: 6 /hpf / WBC 55 /HPF   Sq Epi: x / Non Sq Epi: 5 / Bacteria: Few      IMAGIN/26 CXR:    The lungs are clear. No pleural effusion or pneumothorax.  Cardiac size cannot be adequately assessed on this projection.  There are no acute osseous abnormalities.      IMPRESSION:   Clear lungs.       CT A/P:    LOWER CHEST: Cardiomegaly.  GALLBLADDER: Cholecystectomy.  BLADDER: Collapsed with a Blackman catheter.  REPRODUCTIVE ORGANS: Limited evaluation secondary to extensive streak   artifact from right lower extremity hardware  BOWEL: Gastrostomy balloon within the stomach. No bowel obstruction.   Appendix is not visualized.  VESSELS:  Atheromatous disease of the abdominal aorta.  ABDOMINAL WALL: Anasarca.  BONES: Degenerative changes of the spine. Complete vertebral body height   loss of L2. Total right hip arthroplasty. Chronic bilateral rib fractures   and left acetabular fracture. Mild anterolisthesis of L4 over L5,   unchanged compared to prior exam of 2018.    IMPRESSION:  No acute intra-abdominal pathology or collection.       CT Chest:      LUNGS AND LARGE AIRWAYS: Patent central airways. Left basilar atelectasis.  PLEURA: No pleural effusion.  VESSELS: Atheromatous disease of thethoracic aorta.  HEART: Cardiomegaly.No pericardial effusion. Aortic valve and coronary   artery calcifications.  MEDIASTINUM AND DILLAN: No lymphadenopathy.  CHEST WALL AND LOWER NECK: Within normal limits.  VISUALIZED UPPER ABDOMEN: Cholecystectomy.  BONES: Complete vertebral body height loss of L2. Chronic bilateral rib   fractures.    IMPRESSION:   No pneumonia.      12/3 TTE:  LVEF 26%  1. Mitral annular calcification, otherwise normal mitral  valve. Moderate-severe mitral regurgitation (vena contracta  0.8 cm)  2. Calcified trileafletaortic valve with normal opening.  Peak transaortic valve gradient equals 3 mm Hg, mean  transaortic valve gradient equals 1 mm Hg, aortic valve  velocity time integral equals 13 cm. Moderate aortic  regurgitation  3. Moderately dilated left atrium.LA volume index = 45  cc/m2.  4. Severe global left ventricular systolic dysfunction.  5. Increased E/e'  is consistent with elevated left  ventricular filling pressure.  6. Moderate right atrial enlargement. Inferior vena cava is  dilated (>=2.5cm) with normal respiratory variability  consistent with right atrial pressure 16-20mm Hg.  7. Right ventricular enlargement with decreased right  ventricular systolic function.  8. Normal tricuspid valve. Severe tricuspid regurgitation.  9. Estimated pulmonary artery systolic pressure equals 82  mm Hg, assuming right atrial pressure equals 8 mm Hg,  consistent with severe pulmonary pressures.  10. Color Doppler demonstrates evidence of left to right  shunt CHIEF COMPLAINT: Patient is a 68y old  Female who presents with a chief complaint of Fevers for the past 2 days at home (2019 15:19)      INTERVAL HISTORY / SUBJECTIVE:    Overnight pt was persistently tachy 120s, levo switched to yue and midodrine 10 TID started. Increasing pressor requirements, therefore central line placed. Pt febrile to 39.1, sent cultures and urine legionella. Diffuse non-macular rash after 2nd dose of meropenem, gave benadryl and 500 cc NS bolus. This morning pt appears more alert, following commands.      MEDICATIONS:  MEDICATIONS  (STANDING):  ALBUTerol    90 MICROgram(s) HFA Inhaler 1 Puff(s) Inhalation every 4 hours  apixaban 5 milliGRAM(s) Oral every 12 hours  clonazePAM Tablet 1.5 milliGRAM(s) Oral at bedtime  Dakins Solution - 1/4 Strength 1 Application(s) Topical two times a day  DAPTOmycin IVPB 210 milliGRAM(s) IV Intermittent every 48 hours  famotidine    Tablet 20 milliGRAM(s) Oral daily  ferrous    sulfate Liquid 300 milliGRAM(s) Oral daily  gabapentin   Solution 300 milliGRAM(s) Oral two times a day  insulin lispro (HumaLOG) corrective regimen sliding scale   SubCutaneous three times a day before meals  insulin lispro (HumaLOG) corrective regimen sliding scale   SubCutaneous at bedtime  Medical Marijuana Oil 1 milliLiter(s),Medical Marijuana Oil 1 milliLiter(s),Medical Marijuana Oil 1 milliLiter(s) 1 milliLiter(s) Oral <User Schedule>  meropenem  IVPB 1000 milliGRAM(s) IV Intermittent every 8 hours  micafungin IVPB 100 milliGRAM(s) IV Intermittent every 24 hours  midodrine 10 milliGRAM(s) Oral three times a day  phenylephrine    Infusion 1 MICROgram(s)/kG/Min (13.425 mL/Hr) IV Continuous <Continuous>  predniSONE   Tablet 7.5 milliGRAM(s) Oral daily  QUEtiapine 25 milliGRAM(s) Oral at bedtime  tiotropium 18 MICROgram(s) Capsule 1 Capsule(s) Inhalation daily    MEDICATIONS  (PRN):  acetaminophen    Suspension .. 650 milliGRAM(s) Oral every 6 hours PRN Temp greater or equal to 38C (100.4F)      OBJECTIVE:  ICU Vital Signs Last 24 Hrs  T(C): 36.7 (01 Mar 2019 05:00), Max: 39.1 (2019 23:15)  T(F): 98 (01 Mar 2019 05:00), Max: 102.4 (2019 23:15)  HR: 105 (01 Mar 2019 05:30) (96 - 122)  BP: 109/56 (01 Mar 2019 05:30) (60/50 - 134/59)  BP(mean): 82 (01 Mar 2019 05:30) (52 - 92)  ABP: --  ABP(mean): --  RR: 21 (01 Mar 2019 05:30) (17 - 36)  SpO2: 100% (01 Mar 2019 05:30) (95% - 100%)      CAPILLARY BLOOD GLUCOSE    POCT Blood Glucose.: 109 mg/dL (2019 22:01)  POCT Blood Glucose.: 180 mg/dL (2019 13:40)  POCT Blood Glucose.: 56 mg/dL (2019 13:22)  POCT Blood Glucose.: 57 mg/dL (2019 13:21)  POCT Blood Glucose.: 79 mg/dL (2019 12:43)  POCT Blood Glucose.: 73 mg/dL (2019 06:40)  POCT Blood Glucose.: 51 mg/dL (2019 06:39)      I&O's Summary    2019 07:  -  2019 07:00  --------------------------------------------------------  IN: 3390 mL / OUT: 750 mL / NET: 2640 mL    2019 07:01  -  01 Mar 2019 06:32  --------------------------------------------------------  IN: 3214.4 mL / OUT: 250 mL / NET: 2964.4 mL        PHYSICAL EXAM:  GEN: A&O X 0 , NAD, comfortable  HEENT: NCAT, PERRL, MMM, no scleral icterus, hearing intact  NECK: Supple, No JVD  CVS: S1S2 , tachycardic, No M/R/G appreciated  PULM: CTA B/L,  diminished at bases bilaterally   ABD.: soft. non tender, non distended  Extrem: intact pulses , no edema noted  Derm: stage 4 sacral decubitis ulcer and ulcer at right hip noted       LABS:    ABG - ( 01 Mar 2019 01:40 )  pH, Arterial: 7.39  pH, Blood: x     /  pCO2: 31    /  pO2: 81    / HCO3: 18    / Base Excess: -5.2  /  SaO2: 97                            9.5    36.5  )-----------( 566      ( 01 Mar 2019 01:47 )             27.6     03-    141  |  111<H>  |  76<H>  ----------------------------<  130<H>  4.2   |  15<L>  |  0.98    Ca    8.8      01 Mar 2019 01:47  Phos  2.7     03-  Mg     2.2     03-    TPro  5.5<L>  /  Alb  2.7<L>  /  TBili  0.1<L>  /  DBili  x   /  AST  21  /  ALT  17  /  AlkPhos  79  03-    LIVER FUNCTIONS - ( 01 Mar 2019 01:47 )  Alb: 2.7 g/dL / Pro: 5.5 g/dL / ALK PHOS: 79 U/L / ALT: 17 U/L / AST: 21 U/L / GGT: x           PT/INR - ( 01 Mar 2019 01:47 )   PT: 16.3 sec;   INR: 1.40 ratio         PTT - ( 01 Mar 2019 01:47 )  PTT:37.0 sec    Urinalysis Basic - ( 2019 18:28 )    Color: Yellow / Appearance: Slightly Turbid / S.025 / pH: x  Gluc: x / Ketone: Trace  / Bili: Negative / Urobili: 2 mg/dL   Blood: x / Protein: 100 / Nitrite: Negative   Leuk Esterase: Large / RBC: 6 /hpf / WBC 55 /HPF   Sq Epi: x / Non Sq Epi: 5 / Bacteria: Few      IMAGIN/26 CXR:    The lungs are clear. No pleural effusion or pneumothorax.  Cardiac size cannot be adequately assessed on this projection.  There are no acute osseous abnormalities.      IMPRESSION:   Clear lungs.       CT A/P:    LOWER CHEST: Cardiomegaly.  GALLBLADDER: Cholecystectomy.  BLADDER: Collapsed with a Blackman catheter.  REPRODUCTIVE ORGANS: Limited evaluation secondary to extensive streak   artifact from right lower extremity hardware  BOWEL: Gastrostomy balloon within the stomach. No bowel obstruction.   Appendix is not visualized.  VESSELS:  Atheromatous disease of the abdominal aorta.  ABDOMINAL WALL: Anasarca.  BONES: Degenerative changes of the spine. Complete vertebral body height   loss of L2. Total right hip arthroplasty. Chronic bilateral rib fractures   and left acetabular fracture. Mild anterolisthesis of L4 over L5,   unchanged compared to prior exam of 2018.    IMPRESSION:  No acute intra-abdominal pathology or collection.       CT Chest:      LUNGS AND LARGE AIRWAYS: Patent central airways. Left basilar atelectasis.  PLEURA: No pleural effusion.  VESSELS: Atheromatous disease of thethoracic aorta.  HEART: Cardiomegaly.No pericardial effusion. Aortic valve and coronary   artery calcifications.  MEDIASTINUM AND DILLAN: No lymphadenopathy.  CHEST WALL AND LOWER NECK: Within normal limits.  VISUALIZED UPPER ABDOMEN: Cholecystectomy.  BONES: Complete vertebral body height loss of L2. Chronic bilateral rib   fractures.    IMPRESSION:   No pneumonia.      12/3 TTE:  LVEF 26%  1. Mitral annular calcification, otherwise normal mitral  valve. Moderate-severe mitral regurgitation (vena contracta  0.8 cm)  2. Calcified trileafletaortic valve with normal opening.  Peak transaortic valve gradient equals 3 mm Hg, mean  transaortic valve gradient equals 1 mm Hg, aortic valve  velocity time integral equals 13 cm. Moderate aortic  regurgitation  3. Moderately dilated left atrium.LA volume index = 45  cc/m2.  4. Severe global left ventricular systolic dysfunction.  5. Increased E/e'  is consistent with elevated left  ventricular filling pressure.  6. Moderate right atrial enlargement. Inferior vena cava is  dilated (>=2.5cm) with normal respiratory variability  consistent with right atrial pressure 16-20mm Hg.  7. Right ventricular enlargement with decreased right  ventricular systolic function.  8. Normal tricuspid valve. Severe tricuspid regurgitation.  9. Estimated pulmonary artery systolic pressure equals 82  mm Hg, assuming right atrial pressure equals 8 mm Hg,  consistent with severe pulmonary pressures.  10. Color Doppler demonstrates evidence of left to right  shunt CHIEF COMPLAINT: Patient is a 68y old  Female who presents with a chief complaint of Fevers for the past 2 days at home (2019 15:19)      INTERVAL HISTORY / SUBJECTIVE:    Overnight pt was persistently tachy 120s, levo switched to yue and midodrine 10 TID started. Increasing pressor requirements, therefore central line placed. Pt febrile to 39.1, sent cultures and urine legionella. Diffuse non-macular rash after 2nd dose of meropenem, gave benadryl and 500 cc NS bolus. This morning pt appears more alert, following commands.      MEDICATIONS:  MEDICATIONS  (STANDING):  ALBUTerol    90 MICROgram(s) HFA Inhaler 1 Puff(s) Inhalation every 4 hours  apixaban 5 milliGRAM(s) Oral every 12 hours  clonazePAM Tablet 1.5 milliGRAM(s) Oral at bedtime  Dakins Solution - 1/4 Strength 1 Application(s) Topical two times a day  DAPTOmycin IVPB 210 milliGRAM(s) IV Intermittent every 48 hours  famotidine    Tablet 20 milliGRAM(s) Oral daily  ferrous    sulfate Liquid 300 milliGRAM(s) Oral daily  gabapentin   Solution 300 milliGRAM(s) Oral two times a day  insulin lispro (HumaLOG) corrective regimen sliding scale   SubCutaneous three times a day before meals  insulin lispro (HumaLOG) corrective regimen sliding scale   SubCutaneous at bedtime  Medical Marijuana Oil 1 milliLiter(s),Medical Marijuana Oil 1 milliLiter(s),Medical Marijuana Oil 1 milliLiter(s) 1 milliLiter(s) Oral <User Schedule>  meropenem  IVPB 1000 milliGRAM(s) IV Intermittent every 8 hours  micafungin IVPB 100 milliGRAM(s) IV Intermittent every 24 hours  midodrine 10 milliGRAM(s) Oral three times a day  phenylephrine    Infusion 1 MICROgram(s)/kG/Min (13.425 mL/Hr) IV Continuous <Continuous>  predniSONE   Tablet 7.5 milliGRAM(s) Oral daily  QUEtiapine 25 milliGRAM(s) Oral at bedtime  tiotropium 18 MICROgram(s) Capsule 1 Capsule(s) Inhalation daily    MEDICATIONS  (PRN):  acetaminophen    Suspension .. 650 milliGRAM(s) Oral every 6 hours PRN Temp greater or equal to 38C (100.4F)      OBJECTIVE:  ICU Vital Signs Last 24 Hrs  T(C): 36.7 (01 Mar 2019 05:00), Max: 39.1 (2019 23:15)  T(F): 98 (01 Mar 2019 05:00), Max: 102.4 (2019 23:15)  HR: 105 (01 Mar 2019 05:30) (96 - 122)  BP: 109/56 (01 Mar 2019 05:30) (60/50 - 134/59)  BP(mean): 82 (01 Mar 2019 05:30) (52 - 92)  ABP: --  ABP(mean): --  RR: 21 (01 Mar 2019 05:30) (17 - 36)  SpO2: 100% (01 Mar 2019 05:30) (95% - 100%)      CAPILLARY BLOOD GLUCOSE    POCT Blood Glucose.: 109 mg/dL (2019 22:01)  POCT Blood Glucose.: 180 mg/dL (2019 13:40)  POCT Blood Glucose.: 56 mg/dL (2019 13:22)  POCT Blood Glucose.: 57 mg/dL (2019 13:21)  POCT Blood Glucose.: 79 mg/dL (2019 12:43)  POCT Blood Glucose.: 73 mg/dL (2019 06:40)  POCT Blood Glucose.: 51 mg/dL (2019 06:39)      I&O's Summary    2019 07:  -  2019 07:00  --------------------------------------------------------  IN: 3390 mL / OUT: 750 mL / NET: 2640 mL    2019 07:01  -  01 Mar 2019 06:32  --------------------------------------------------------  IN: 3214.4 mL / OUT: 250 mL / NET: 2964.4 mL      PHYSICAL EXAM:  GEN: A&O X 0, NAD, appears contracted but comfortable  HEENT: NCAT, PERRL, MMM, no scleral icterus, hearing intact  NECK: Supple, No JVD  CVS: S1S2 , tachycardic, No M/R/G appreciated  PULM: CTA B/L, diminished at bases bilaterally   ABD: soft. non tender, non distended, +BS  Extrem: intact pulses, no edema noted  Neuro: A&O X 0 (nonverbal), following commands, moving all extremities  Derm: stage 4 sacral decubitis ulcer and ulcer at right hip      LABS:    ABG - ( 01 Mar 2019 01:40 )  pH, Arterial: 7.39  pH, Blood: x     /  pCO2: 31    /  pO2: 81    / HCO3: 18    / Base Excess: -5.2  /  SaO2: 97                            9.5    36.5  )-----------( 566      ( 01 Mar 2019 01:47 )             27.6     03-    141  |  111<H>  |  76<H>  ----------------------------<  130<H>  4.2   |  15<L>  |  0.98    Ca    8.8      01 Mar 2019 01:47  Phos  2.7     03-  Mg     2.2     03-    TPro  5.5<L>  /  Alb  2.7<L>  /  TBili  0.1<L>  /  DBili  x   /  AST  21  /  ALT  17  /  AlkPhos  79  03-01    LIVER FUNCTIONS - ( 01 Mar 2019 01:47 )  Alb: 2.7 g/dL / Pro: 5.5 g/dL / ALK PHOS: 79 U/L / ALT: 17 U/L / AST: 21 U/L / GGT: x           PT/INR - ( 01 Mar 2019 01:47 )   PT: 16.3 sec;   INR: 1.40 ratio         PTT - ( 01 Mar 2019 01:47 )  PTT:37.0 sec    Urinalysis Basic - ( 2019 18:28 )    Color: Yellow / Appearance: Slightly Turbid / S.025 / pH: x  Gluc: x / Ketone: Trace  / Bili: Negative / Urobili: 2 mg/dL   Blood: x / Protein: 100 / Nitrite: Negative   Leuk Esterase: Large / RBC: 6 /hpf / WBC 55 /HPF   Sq Epi: x / Non Sq Epi: 5 / Bacteria: Few      IMAGIN/26 CXR:    The lungs are clear. No pleural effusion or pneumothorax.  Cardiac size cannot be adequately assessed on this projection.  There are no acute osseous abnormalities.      IMPRESSION:   Clear lungs.       CT A/P:    LOWER CHEST: Cardiomegaly.  GALLBLADDER: Cholecystectomy.  BLADDER: Collapsed with a Blackman catheter.  REPRODUCTIVE ORGANS: Limited evaluation secondary to extensive streak   artifact from right lower extremity hardware  BOWEL: Gastrostomy balloon within the stomach. No bowel obstruction.   Appendix is not visualized.  VESSELS:  Atheromatous disease of the abdominal aorta.  ABDOMINAL WALL: Anasarca.  BONES: Degenerative changes of the spine. Complete vertebral body height   loss of L2. Total right hip arthroplasty. Chronic bilateral rib fractures   and left acetabular fracture. Mild anterolisthesis of L4 over L5,   unchanged compared to prior exam of 2018.    IMPRESSION:  No acute intra-abdominal pathology or collection.       CT Chest:      LUNGS AND LARGE AIRWAYS: Patent central airways. Left basilar atelectasis.  PLEURA: No pleural effusion.  VESSELS: Atheromatous disease of thethoracic aorta.  HEART: Cardiomegaly.No pericardial effusion. Aortic valve and coronary   artery calcifications.  MEDIASTINUM AND DILLAN: No lymphadenopathy.  CHEST WALL AND LOWER NECK: Within normal limits.  VISUALIZED UPPER ABDOMEN: Cholecystectomy.  BONES: Complete vertebral body height loss of L2. Chronic bilateral rib   fractures.    IMPRESSION:   No pneumonia.      12/3 TTE:  LVEF 26%  1. Mitral annular calcification, otherwise normal mitral  valve. Moderate-severe mitral regurgitation (vena contracta  0.8 cm)  2. Calcified trileafletaortic valve with normal opening.  Peak transaortic valve gradient equals 3 mm Hg, mean  transaortic valve gradient equals 1 mm Hg, aortic valve  velocity time integral equals 13 cm. Moderate aortic  regurgitation  3. Moderately dilated left atrium.LA volume index = 45  cc/m2.  4. Severe global left ventricular systolic dysfunction.  5. Increased E/e'  is consistent with elevated left  ventricular filling pressure.  6. Moderate right atrial enlargement. Inferior vena cava is  dilated (>=2.5cm) with normal respiratory variability  consistent with right atrial pressure 16-20mm Hg.  7. Right ventricular enlargement with decreased right  ventricular systolic function.  8. Normal tricuspid valve. Severe tricuspid regurgitation.  9. Estimated pulmonary artery systolic pressure equals 82  mm Hg, assuming right atrial pressure equals 8 mm Hg,  consistent with severe pulmonary pressures.  10. Color Doppler demonstrates evidence of left to right  shunt CHIEF COMPLAINT: Patient is a 68y old  Female who presents with a chief complaint of Fevers for the past 2 days at home (2019 15:19)      INTERVAL HISTORY / SUBJECTIVE:    Overnight pt was persistently tachy 120s, levo switched to yue and midodrine 10 TID started. Increasing pressor requirements, therefore central line placed. Pt febrile to 39.1, sent cultures and urine legionella. Diffuse non-macular rash after 2nd dose of meropenem, gave benadryl and 500 cc NS bolus. This morning pt appears more alert, following commands.      MEDICATIONS:  MEDICATIONS  (STANDING):  ALBUTerol    90 MICROgram(s) HFA Inhaler 1 Puff(s) Inhalation every 4 hours  apixaban 5 milliGRAM(s) Oral every 12 hours  clonazePAM Tablet 1.5 milliGRAM(s) Oral at bedtime  Dakins Solution - 1/4 Strength 1 Application(s) Topical two times a day  DAPTOmycin IVPB 210 milliGRAM(s) IV Intermittent every 48 hours  famotidine    Tablet 20 milliGRAM(s) Oral daily  ferrous    sulfate Liquid 300 milliGRAM(s) Oral daily  gabapentin   Solution 300 milliGRAM(s) Oral two times a day  insulin lispro (HumaLOG) corrective regimen sliding scale   SubCutaneous three times a day before meals  insulin lispro (HumaLOG) corrective regimen sliding scale   SubCutaneous at bedtime  Medical Marijuana Oil 1 milliLiter(s),Medical Marijuana Oil 1 milliLiter(s),Medical Marijuana Oil 1 milliLiter(s) 1 milliLiter(s) Oral <User Schedule>  meropenem  IVPB 1000 milliGRAM(s) IV Intermittent every 8 hours  micafungin IVPB 100 milliGRAM(s) IV Intermittent every 24 hours  midodrine 10 milliGRAM(s) Oral three times a day  phenylephrine    Infusion 1 MICROgram(s)/kG/Min (13.425 mL/Hr) IV Continuous <Continuous>  predniSONE   Tablet 7.5 milliGRAM(s) Oral daily  QUEtiapine 25 milliGRAM(s) Oral at bedtime  tiotropium 18 MICROgram(s) Capsule 1 Capsule(s) Inhalation daily    MEDICATIONS  (PRN):  acetaminophen    Suspension .. 650 milliGRAM(s) Oral every 6 hours PRN Temp greater or equal to 38C (100.4F)      OBJECTIVE:  ICU Vital Signs Last 24 Hrs  T(C): 36.7 (01 Mar 2019 05:00), Max: 39.1 (2019 23:15)  T(F): 98 (01 Mar 2019 05:00), Max: 102.4 (2019 23:15)  HR: 105 (01 Mar 2019 05:30) (96 - 122)  BP: 109/56 (01 Mar 2019 05:30) (60/50 - 134/59)  BP(mean): 82 (01 Mar 2019 05:30) (52 - 92)  ABP: --  ABP(mean): --  RR: 21 (01 Mar 2019 05:30) (17 - 36)  SpO2: 100% (01 Mar 2019 05:30) (95% - 100%)      CAPILLARY BLOOD GLUCOSE    POCT Blood Glucose.: 109 mg/dL (2019 22:01)  POCT Blood Glucose.: 180 mg/dL (2019 13:40)  POCT Blood Glucose.: 56 mg/dL (2019 13:22)  POCT Blood Glucose.: 57 mg/dL (2019 13:21)  POCT Blood Glucose.: 79 mg/dL (2019 12:43)  POCT Blood Glucose.: 73 mg/dL (2019 06:40)  POCT Blood Glucose.: 51 mg/dL (2019 06:39)      I&O's Summary    2019 07:  -  2019 07:00  --------------------------------------------------------  IN: 3390 mL / OUT: 750 mL / NET: 2640 mL    2019 07:01  -  01 Mar 2019 06:32  --------------------------------------------------------  IN: 3214.4 mL / OUT: 250 mL / NET: 2964.4 mL      PHYSICAL EXAM:  GEN: A&O X 0, NAD, appears contracted but comfortable  HEENT: NCAT, PERRL, MMM, no scleral icterus, hearing intact  NECK: Supple, No JVD  CVS: S1S2 , tachycardic, No M/R/G appreciated  PULM: CTA B/L, diminished at bases bilaterally   ABD: soft. non tender, non distended, +BS  Extrem: intact pulses, no edema noted  Neuro: A&O X 0 (nonverbal), following commands, moving all extremities  Derm: stage 4 sacral decubitis ulcer and ulcer at right hip  Lines: R IJ line in place    LABS:    ABG - ( 01 Mar 2019 01:40 )  pH, Arterial: 7.39  pH, Blood: x     /  pCO2: 31    /  pO2: 81    / HCO3: 18    / Base Excess: -5.2  /  SaO2: 97                            9.5    36.5  )-----------( 566      ( 01 Mar 2019 01:47 )             27.6     03-    141  |  111<H>  |  76<H>  ----------------------------<  130<H>  4.2   |  15<L>  |  0.98    Ca    8.8      01 Mar 2019 01:47  Phos  2.7     03-  Mg     2.2     03-    TPro  5.5<L>  /  Alb  2.7<L>  /  TBili  0.1<L>  /  DBili  x   /  AST  21  /  ALT  17  /  AlkPhos  79  03-01    LIVER FUNCTIONS - ( 01 Mar 2019 01:47 )  Alb: 2.7 g/dL / Pro: 5.5 g/dL / ALK PHOS: 79 U/L / ALT: 17 U/L / AST: 21 U/L / GGT: x           PT/INR - ( 01 Mar 2019 01:47 )   PT: 16.3 sec;   INR: 1.40 ratio         PTT - ( 01 Mar 2019 01:47 )  PTT:37.0 sec    Urinalysis Basic - ( 2019 18:28 )    Color: Yellow / Appearance: Slightly Turbid / S.025 / pH: x  Gluc: x / Ketone: Trace  / Bili: Negative / Urobili: 2 mg/dL   Blood: x / Protein: 100 / Nitrite: Negative   Leuk Esterase: Large / RBC: 6 /hpf / WBC 55 /HPF   Sq Epi: x / Non Sq Epi: 5 / Bacteria: Few      IMAGIN/26 CXR:    The lungs are clear. No pleural effusion or pneumothorax.  Cardiac size cannot be adequately assessed on this projection.  There are no acute osseous abnormalities.      IMPRESSION:   Clear lungs.       CT A/P:    LOWER CHEST: Cardiomegaly.  GALLBLADDER: Cholecystectomy.  BLADDER: Collapsed with a Blackman catheter.  REPRODUCTIVE ORGANS: Limited evaluation secondary to extensive streak   artifact from right lower extremity hardware  BOWEL: Gastrostomy balloon within the stomach. No bowel obstruction.   Appendix is not visualized.  VESSELS:  Atheromatous disease of the abdominal aorta.  ABDOMINAL WALL: Anasarca.  BONES: Degenerative changes of the spine. Complete vertebral body height   loss of L2. Total right hip arthroplasty. Chronic bilateral rib fractures   and left acetabular fracture. Mild anterolisthesis of L4 over L5,   unchanged compared to prior exam of 2018.    IMPRESSION:  No acute intra-abdominal pathology or collection.       CT Chest:      LUNGS AND LARGE AIRWAYS: Patent central airways. Left basilar atelectasis.  PLEURA: No pleural effusion.  VESSELS: Atheromatous disease of thethoracic aorta.  HEART: Cardiomegaly.No pericardial effusion. Aortic valve and coronary   artery calcifications.  MEDIASTINUM AND DILLAN: No lymphadenopathy.  CHEST WALL AND LOWER NECK: Within normal limits.  VISUALIZED UPPER ABDOMEN: Cholecystectomy.  BONES: Complete vertebral body height loss of L2. Chronic bilateral rib   fractures.    IMPRESSION:   No pneumonia.      12/3 TTE:  LVEF 26%  1. Mitral annular calcification, otherwise normal mitral  valve. Moderate-severe mitral regurgitation (vena contracta  0.8 cm)  2. Calcified trileafletaortic valve with normal opening.  Peak transaortic valve gradient equals 3 mm Hg, mean  transaortic valve gradient equals 1 mm Hg, aortic valve  velocity time integral equals 13 cm. Moderate aortic  regurgitation  3. Moderately dilated left atrium.LA volume index = 45  cc/m2.  4. Severe global left ventricular systolic dysfunction.  5. Increased E/e'  is consistent with elevated left  ventricular filling pressure.  6. Moderate right atrial enlargement. Inferior vena cava is  dilated (>=2.5cm) with normal respiratory variability  consistent with right atrial pressure 16-20mm Hg.  7. Right ventricular enlargement with decreased right  ventricular systolic function.  8. Normal tricuspid valve. Severe tricuspid regurgitation.  9. Estimated pulmonary artery systolic pressure equals 82  mm Hg, assuming right atrial pressure equals 8 mm Hg,  consistent with severe pulmonary pressures.  10. Color Doppler demonstrates evidence of left to right  shunt

## 2019-03-01 NOTE — PROGRESS NOTE ADULT - ASSESSMENT
68y F with PMH of HFrEF (EF 20%), advanced dementia (AOX0 at baseline) bedbound with stage 4 sacral and R hip decubitus ulcer c/b R hip MRSA infection s/p PEG and chronic gavin, presumed asthma on steroids, multiple recent prolonged hospitalizations, presented to the hospital on 2/26 with fevers to 103 as well as cough and loose BMs, found to be septic likely 2/2 UTI vs sacral ulcer vs PNA, course c/b septic shock with SBP 60's now on norepinephrine.    #Neuro  CVA, no acute issues, AAOx0 at baseline but alert   - c/w gabapentin 300 mg bid  - c/w quetiapine 25 mg qD  - c/w clonazepam 1.5 mg qhs  - c/w medical marijuana 1 mL tid  - monitor mental status    #Resp  Presumed asthma on home steroids. Possible PNA. Satting well on room air. 2/26 CXR w clear lungs, however POCUS showing diffuse B lines with pleural thickening, concern for infectious etiology in left lung  - c/w antibiotics as below, will follow up with ID in regards to optimal antibiotic regimen   - c/w albuterol q4h  - c/w tiotropium 18 mcg qD  - c/w prednisone 7.5 mg qD  - c/w antibiotics as below    #ID  History of difficult to control MRSA bacteremia which resolved after RT hip was debrided and explanted. Also history of pneumonia on more than 1 occasion s/p treatment. Unexplained fevers x 6 weeks during last hospital stay, with negative CT of C/A/P and negative cultures, rheumatology evaluation was also without an explanation for fever. 1/4 bottles with a coag negative staph, likely a contaminant. CDT is negative.  Now with septic shock 2/2 UTI vs decub ulcer vs PNA. Leukocytosis to 36 with left shift, stable at 33. UA positive, UCx 2/27 w >100K GNRs & GPCs. BCx 2/27 w coag neg staph. RVP negative. CXR 2/26 negative for infection, however POCUS suggestive of PNA as described above. Started on meropenem for abx coverage in addition to daptomycin.  - c/w meropenem 1000 mg IV q8h  - start daptomycin 210 mg IV q48h  - start micafungin 100 mg IV qD  - will follow up with ID in regards to better lung penetration   - replace gavin catheter (last changed on 2/26)  - send repeat urine culture  - f/u BCx & sputum Cx  - c/w tylenol 650 mg PO prn fever    #CV  HFrEF w EF 26%, pulmonary HTN, proBNP 5829.  Hypotension w SBP 60s likely 2/2 septic shock, given 1L NS bolus at RRT with pressures improved to 90/63, started on levophed. Lactate 1.2.  - c/w norepinephrine gtt, titrate as needed  - c/w antibiotics as above  - c/w NS at 70 cc/hr, caution i/s/o HF  - monitor I&Os & daily wts    #GI  Ulcerative colitis, GERD, s/p PEG tube  - c/w famotidine 20 mg PO qD  - c/w tube feeds  - monitor BMs    #Heme  chronic DVT  - c/w apixaban 5 mg q12h  Iron deficiency anemia w stable Hg ~9 and no e/o active bleed  - c/w ferrous sulfate 300 mg qD  - monitor CBC, maintain active T&S, transfuse for Hg < 7    #Endo  No h/o diabetes. HgA1C 5.3%.  - c/w ISS    #Psych  Anxiety, depression  - c/w quetiapine, clonazepam, medical marijuana as above    #PPx  DVT ppx: c/w apixaban      Jo Ann Eid PGY-1  Internal Medicine [facility]  Pager [phone] / [phone] 68y F with PMH of HFrEF (EF 20%), advanced dementia (AOX0 at baseline) bedbound with stage 4 sacral and R hip decubitus ulcer c/b R hip MRSA infection s/p PEG and chronic gavin, presumed asthma on steroids, multiple recent prolonged hospitalizations, presented to the hospital on 2/26 with fevers to 103 as well as cough and loose BMs, found to be septic likely 2/2 UTI vs sacral ulcer vs PNA, course c/b septic shock with SBP 60's now on norepinephrine.    #Neuro  advanced dementia & h/o CVA, AAOx0 at baseline but alert and following commands  - c/w gabapentin 300 mg bid  - c/w quetiapine 25 mg qD  - c/w clonazepam 1.5 mg qhs  - c/w medical marijuana 1 mL tid  - monitor mental status    #Resp  Presumed asthma on home steroids. Possible PNA. Satting well on room air. 2/26 CXR w clear lungs, however POCUS showing diffuse B lines with pleural thickening, concern for infectious etiology in left lung  - c/w antibiotics as below, will follow up with ID in regards to optimal antibiotic regimen   - c/w albuterol q4h  - c/w tiotropium 18 mcg qD  - c/w prednisone 7.5 mg qD  - c/w antibiotics as below    #ID  History of difficult to control MRSA bacteremia which resolved after RT hip was debrided and explanted. Also history of pneumonia on more than 1 occasion s/p treatment. Unexplained fevers x 6 weeks during last hospital stay, with negative CT of C/A/P and negative cultures, rheumatology evaluation was also without an explanation for fever. 1/4 bottles with a coag negative staph, likely a contaminant. CDT is negative.  Now with septic shock 2/2 UTI vs decub ulcer vs PNA. Leukocytosis to 36 with left shift, stable at 33. UA positive, UCx 2/27 w >100K GNRs & GPCs. BCx 2/27 w coag neg staph. RVP negative. CXR 2/26 negative for infection, however POCUS suggestive of PNA as described above. Started on meropenem for abx coverage in addition to daptomycin.  - c/w meropenem 1000 mg IV q8h  - start daptomycin 210 mg IV q48h  - start micafungin 100 mg IV qD  - will follow up with ID in regards to better lung penetration   - replace gavin catheter (last changed on 2/26)  - send repeat urine culture  - f/u BCx & sputum Cx  - c/w tylenol 650 mg PO prn fever    #CV  HFrEF w EF 26%, pulmonary HTN, proBNP 5829.  Hypotension w SBP 60s likely 2/2 septic shock, given 1L NS bolus at RRT with pressures improved to 90/63, started on levophed. Lactate 1.2.  - c/w norepinephrine gtt, titrate as needed  - c/w antibiotics as above  - c/w NS at 70 cc/hr, caution i/s/o HF  - monitor I&Os & daily wts    #GI  Ulcerative colitis, GERD, s/p PEG tube  - c/w famotidine 20 mg PO qD  - c/w tube feeds  - monitor BMs    #Heme  chronic DVT  - c/w apixaban 5 mg q12h  Iron deficiency anemia w stable Hg ~9 and no e/o active bleed  - c/w ferrous sulfate 300 mg qD  - monitor CBC, maintain active T&S, transfuse for Hg < 7    #Endo  No h/o diabetes. HgA1C 5.3%.  - c/w ISS    #Psych  Anxiety, depression  - c/w quetiapine, clonazepam, medical marijuana as above    #PPx  DVT ppx: c/w apixaban      Jo Ann Eid PGY-1  Internal Medicine [facility]  Pager [phone] / [phone] 68y F with PMH of HFrEF (EF 20%), advanced dementia (AOX0 at baseline) bedbound with stage 4 sacral and R hip decubitus ulcer c/b R hip MRSA infection s/p PEG and chronic gavin, presumed asthma on steroids, multiple recent prolonged hospitalizations, presented to the hospital on 2/26 with fevers to 103 as well as cough and loose BMs, found to be septic likely 2/2 UTI vs sacral ulcer vs PNA, course c/b septic shock with SBP 60's now on norepinephrine.    #Neuro  Advanced dementia & h/o CVA, AAOx0 at baseline but alert and following commands  - c/w gabapentin 300 mg bid  - c/w quetiapine 25 mg qD  - c/w clonazepam 1.5 mg qhs  - c/w medical marijuana 1 mL tid  - monitor mental status    #Resp  Presumed asthma on home steroids. Possible PNA. Satting well on room air. 2/26 CXR w clear lungs, however POCUS showing diffuse B lines with pleural thickening, concern for infectious etiology in left lung  - c/w antibiotics as below, will follow up with ID in regards to optimal antibiotic regimen   - c/w albuterol q4h  - c/w tiotropium 18 mcg qD  - c/w prednisone 7.5 mg qD  - c/w antibiotics as below    #ID  Septic shock. History of difficult to control MRSA bacteremia which resolved after RT hip was debrided and explanted. Also history of pneumonia on more than 1 occasion s/p treatment. Unexplained fevers x 6 weeks during last hospital stay, with negative CT of C/A/P and negative cultures, rheumatology evaluation was also without an explanation for fever. 1/4 bottles with a coag negative staph, likely a contaminant. CDT is negative.  Now with septic shock 2/2 UTI vs decub ulcer vs PNA. Leukocytosis to 36 with left shift, stable at 33. UA positive, UCx 2/27 w >100K GNRs & GPCs. BCx 2/27 w coag neg staph. RVP negative. CXR 2/26 negative for infection, however POCUS suggestive of PNA as described above. Started on meropenem for abx coverage in addition to daptomycin.  - c/w meropenem 1000 mg IV q8h  - start daptomycin 210 mg IV q48h  - start micafungin 100 mg IV qD  - will follow up with ID in regards to better lung penetration   - replace gavin catheter (last changed on 2/26)  - send repeat urine culture  - f/u BCx & sputum Cx  - c/w tylenol 650 mg PO prn fever    #CV  HFrEF w EF 26%, pulmonary HTN, proBNP 5829.  Hypotension w SBP 60s likely 2/2 septic shock, given 1L NS bolus at RRT with pressures improved to 90/63, started on levophed. Lactate 1.2.  - c/w norepinephrine gtt, titrate as needed  - c/w antibiotics as above  - c/w NS at 70 cc/hr, caution i/s/o HF  - monitor I&Os & daily wts    #GI  Ulcerative colitis, GERD, s/p PEG tube  - c/w famotidine 20 mg PO qD  - c/w tube feeds  - monitor BMs    #Heme  chronic DVT  - c/w apixaban 5 mg q12h  Iron deficiency anemia w stable Hg ~9 and no e/o active bleed  - c/w ferrous sulfate 300 mg qD  - monitor CBC, maintain active T&S, transfuse for Hg < 7    #Endo  No h/o diabetes. HgA1C 5.3%.  - c/w ISS    #Psych  Anxiety, depression  - c/w quetiapine, clonazepam, medical marijuana as above    #PPx  DVT ppx: c/w apixaban      Jo Ann Eid PGY-1  Internal Medicine [facility]  Pager [phone] / [phone] 68y F with PMH of HFrEF (EF 20%), advanced dementia (AOX0 at baseline) bedbound with stage 4 sacral and R hip decubitus ulcer c/b R hip MRSA infection s/p PEG and chronic gavin, presumed asthma on steroids, multiple recent prolonged hospitalizations, presented to the hospital on 2/26 with fevers to 103 as well as cough and loose BMs, found to be septic likely 2/2 UTI vs sacral ulcer vs PNA, course c/b septic shock with SBP 60's now on norepinephrine.    #Neuro  Advanced dementia & h/o CVA, AAOx0 at baseline, currently alert and following commands, though still more lethargic than usual as per   - tx for septic shock as below  - c/w gabapentin 300 mg bid  - c/w quetiapine 25 mg qD  - c/w clonazepam 1.5 mg qhs  - c/w medical marijuana 1 mL tid  - monitor mental status    #Resp  Presumed asthma on home steroids. Possible PNA. Satting well on room air. 2/26 CXR w clear lungs, however POCUS showing diffuse B lines with pleural thickening, concern for infectious etiology in left lung  - c/w antibiotics as below, will follow up with ID in regards to optimal antibiotic regimen   - c/w albuterol q4h  - c/w tiotropium 18 mcg qD  - c/w prednisone 7.5 mg qD  - c/w antibiotics as below  - appreciate ID recs    #ID  Septic shock. History of difficult to control MRSA bacteremia which resolved after RT hip was debrided and explanted. Also history of pneumonia on more than 1 occasion s/p treatment. Unexplained fevers x 6 weeks during last hospital stay, with negative CT of C/A/P and negative cultures, rheumatology evaluation was also without an explanation for fever. 1/4 bottles with a coag negative staph, likely a contaminant. CDT is negative.  Now with septic shock 2/2 UTI vs decub ulcer vs PNA. Leukocytosis to 36 with left shift, stable at 33. UA positive, UCx 2/27 w >100K GNRs & GPCs. BCx 2/27 w coag neg staph. RVP negative. CXR 2/26 negative for infection, however POCUS suggestive of PNA as described above. Exchanged gavin and started on meropenem & micafungin for abx coverage in addition to daptomycin.  - CT C/A/P to eval for source of infection  - c/w meropenem 1000 mg IV q12h  - c/w daptomycin 210 mg IV q48h  - c/w micafungin 100 mg IV qD  - replace gavin catheter (last changed on 2/26)  - send repeat urine culture  - f/u BCx, UCx, sputum Cx  - c/w tylenol 650 mg PO prn fever  - appreciate ID recs    #CV  HFrEF w EF 26%, pulmonary HTN, proBNP 5829.  Hypotension w SBP 60s likely 2/2 septic shock, lactate 1.2 given 1L NS bolus at RRT on 2/28 with pressures improved to 90/63, started on levophed, then switched to phenylephrine i/s/o tachycardia.  - c/w antibiotics as above  - c/w phenylephrine gtt, titrate as needed  - monitor BP, cautious IVF i/s/o HF. Given 500cc overnight, giving another 250cc today.  - monitor I&Os & daily wts    #GI  Ulcerative colitis, GERD, s/p PEG tube  - c/w famotidine 20 mg PO qD  - c/w tube feeds  - monitor BMs    #Heme  Chronic DVT noted on prior admission  - c/w apixaban 5 mg q12h  Iron deficiency anemia w stable Hg ~9 and no e/o active bleed  - c/w ferrous sulfate 300 mg qD  - monitor CBC, maintain active T&S, transfuse for Hg < 7    #Endo  No h/o diabetes. HgA1C 5.3%.  - c/w ISS    #Psych  Anxiety, depression  - c/w quetiapine, clonazepam, medical marijuana as above    #PPx  DVT ppx: c/w apixaban      Jo Ann Eid PGY-1  Internal Medicine [facility]  Pager [phone] / [phone] 68y F with PMH of HFrEF (EF 20%), advanced dementia (AOX0 at baseline) bedbound with stage 4 sacral and R hip decubitus ulcer c/b R hip MRSA infection s/p PEG and chronic gavin, presumed asthma on steroids, multiple recent prolonged hospitalizations, presented to the hospital on 2/26 with fevers to 103 as well as cough and loose BMs, found to be septic likely 2/2 UTI vs sacral ulcer vs PNA, course c/b septic shock with SBP 60's now on norepinephrine.    #Neuro  Advanced dementia & h/o CVA, AAOx0 at baseline, currently alert and following commands, though still more lethargic than usual as per   - tx for septic shock as below  - c/w gabapentin 300 mg bid  - c/w quetiapine 25 mg qD  - c/w clonazepam 1.5 mg qhs  - c/w medical marijuana 1 mL tid  - monitor mental status    #Resp  Presumed asthma on home steroids. Possible PNA. Satting well on room air. 2/26 CXR w clear lungs, however POCUS showing diffuse B lines with pleural thickening, concern for infectious etiology in left lung  - c/w antibiotics as below, will follow up with ID in regards to optimal antibiotic regimen  - c/w prednisone 7.5 mg qD  - c/w albuterol q4h  - c/w tiotropium 18 mcg qD  - appreciate ID recs    #ID  Septic shock. History of difficult to control MRSA bacteremia which resolved after RT hip was debrided and explanted. Also history of pneumonia on more than 1 occasion s/p treatment. Unexplained fevers x 6 weeks during last hospital stay, with negative CT of C/A/P and negative cultures, rheumatology evaluation was also without an explanation for fever. 1/4 bottles with a coag negative staph, likely a contaminant. CDT is negative.  Now with septic shock 2/2 UTI vs decub ulcer vs PNA. Leukocytosis to 36 with left shift, stable at 33. UA positive, UCx 2/27 w >100K GNRs & GPCs. BCx 2/27 w coag neg staph. RVP negative. Cdiff & stool O&P negative. CXR 2/26 negative for infection, however POCUS suggestive of PNA as described above. Exchanged gavin and started on meropenem & micafungin for abx coverage in addition to daptomycin.  - CT C/A/P to eval for source of infection  - c/w meropenem 1000 mg IV q12h  - c/w daptomycin 210 mg IV q48h  - c/w micafungin 100 mg IV qD  - replace gavin catheter (last changed on 2/26)  - send repeat urine culture  - f/u BCx, UCx, sputum Cx  - c/w tylenol 650 mg PO prn fever  - appreciate ID recs    #CV  HFrEF w EF 26%, pulmonary HTN, proBNP 5829.  Hypotension w SBP 60s likely 2/2 septic shock, lactate 1.2 given 1L NS bolus at RRT on 2/28 with pressures improved to 90/63, started on levophed, then switched to phenylephrine i/s/o tachycardia.  - c/w antibiotics as above  - c/w phenylephrine gtt, titrate as needed  - monitor BP, cautious IVF i/s/o HF. Given 500cc overnight, giving another 250cc today.  - monitor I&Os & daily wts    #GI  Ulcerative colitis, GERD, s/p PEG tube  - c/w famotidine 20 mg PO qD  - c/w tube feeds  - monitor BMs    #Heme  Chronic DVT noted on prior admission  - c/w apixaban 5 mg q12h  Iron deficiency anemia w stable Hg ~9 and no e/o active bleed  - c/w ferrous sulfate 300 mg qD  - monitor CBC, maintain active T&S, transfuse for Hg < 7    #Endo  No h/o diabetes. HgA1C 5.3%.  - c/w ISS    #Psych  Anxiety, depression  - c/w quetiapine, clonazepam, medical marijuana as above    #PPx  DVT ppx: c/w apixaban      Jo Ann Eid PGY-1  Internal Medicine [facility]  Pager [phone] / [phone]

## 2019-03-01 NOTE — PROGRESS NOTE ADULT - SUBJECTIVE AND OBJECTIVE BOX
---___---___---___---___---___---___ ---___---___---___---___---___---___---___---___---___---                    <<<  M E D I C A L   A T T E N D I N G    F O L L O W    U P   N O T E  >>>    had hypotensive episode again and was sent to icu . now on levophed      ---___---___---___---___---___---      <<<  MEDICATIONS:  >>>    MEDICATIONS  (STANDING):  ALBUTerol    90 MICROgram(s) HFA Inhaler 1 Puff(s) Inhalation every 4 hours  apixaban 5 milliGRAM(s) Oral every 12 hours  clonazePAM Tablet 1.5 milliGRAM(s) Oral at bedtime  Dakins Solution - 1/4 Strength 1 Application(s) Topical two times a day  DAPTOmycin IVPB 210 milliGRAM(s) IV Intermittent every 48 hours  famotidine    Tablet 20 milliGRAM(s) Oral daily  ferrous    sulfate Liquid 300 milliGRAM(s) Oral daily  gabapentin   Solution 300 milliGRAM(s) Oral two times a day  insulin lispro (HumaLOG) corrective regimen sliding scale   SubCutaneous three times a day before meals  insulin lispro (HumaLOG) corrective regimen sliding scale   SubCutaneous at bedtime  Medical Marijuana Oil 1 milliLiter(s),Medical Marijuana Oil 1 milliLiter(s),Medical Marijuana Oil 1 milliLiter(s) 1 milliLiter(s) Oral <User Schedule>  meropenem  IVPB 1000 milliGRAM(s) IV Intermittent every 8 hours  micafungin IVPB 100 milliGRAM(s) IV Intermittent every 24 hours  midodrine 10 milliGRAM(s) Oral three times a day  phenylephrine    Infusion 1 MICROgram(s)/kG/Min (13.425 mL/Hr) IV Continuous <Continuous>  predniSONE   Tablet 7.5 milliGRAM(s) Oral daily  QUEtiapine 25 milliGRAM(s) Oral at bedtime  tiotropium 18 MICROgram(s) Capsule 1 Capsule(s) Inhalation daily      MEDICATIONS  (PRN):  acetaminophen    Suspension .. 650 milliGRAM(s) Oral every 6 hours PRN Temp greater or equal to 38C (100.4F)       ---___---___---___---___---___---     <<<REVIEW OF SYSTEM: >>>  unable to obtain      ---___---___---___---___---___---          <<<  VITAL SIGNS: >>>    T(F): 98.2 (19 @ 06:00), Max: 102.4 (19 @ 23:15)  HR: 108 (19 @ 07:00) (96 - 122)  BP: 89/53 (19 @ 07:00) (60/50 - 134/59)  RR: 24 (19 @ 07:00) (17 - 37)  SpO2: 100% (19 @ 07:00) (95% - 100%)  Wt(kg): --  CAPILLARY BLOOD GLUCOSE      POCT Blood Glucose.: 109 mg/dL (2019 22:01)    I&O's Summary    2019 07:  -  01 Mar 2019 07:00  --------------------------------------------------------  IN: 3228.2 mL / OUT: 340 mL / NET: 2888.2 mL         ---___---___---___---___---___---                       PHYSICAL EXAM:    GEN: A&O X  0, NAD , comfortable  HEENT: NCAT, PERRL, MMM, no scleral icterus, hearing intact  NECK: Supple, No JVD  CVS: S1S2 , regular , No M/R/G appreciated  PULM: CTA B/L,  no W/R/R appreciated peg noted   ABD.: soft. non tender, non distended,  bowel sounds present  Extrem: intact pulses , no edema noted  Derm: No rash or ecchymosis noted stage 4 sacral decubitus noted         ---___---___---___---___---___---     <<<  LAB AND IMAGING: >>>                          9.5    36.5  )-----------( 566      ( 01 Mar 2019 01:47 )             27.6               03-    141  |  111<H>  |  76<H>  ----------------------------<  130<H>  4.2   |  15<L>  |  0.98    Ca    8.8      01 Mar 2019 01:47  Phos  2.7     03-  Mg     2.2     -    TPro  5.5<L>  /  Alb  2.7<L>  /  TBili  0.1<L>  /  DBili  x   /  AST  21  /  ALT  17  /  AlkPhos  79  03-    PT/INR - ( 01 Mar 2019 01:47 )   PT: 16.3 sec;   INR: 1.40 ratio         PTT - ( 01 Mar 2019 01:47 )  PTT:37.0 sec       Urinalysis Basic - ( 2019 18:28 )    Color: Yellow / Appearance: Slightly Turbid / S.025 / pH: x  Gluc: x / Ketone: Trace  / Bili: Negative / Urobili: 2 mg/dL   Blood: x / Protein: 100 / Nitrite: Negative   Leuk Esterase: Large / RBC: 6 /hpf / WBC 55 /HPF   Sq Epi: x / Non Sq Epi: 5 / Bacteria: Few                ABG - ( 01 Mar 2019 01:40 )  pH, Arterial: 7.39  pH, Blood: x     /  pCO2: 31    /  pO2: 81    / HCO3: 18    / Base Excess: -5.2  /  SaO2: 97                      [All pertinent / recent available Imaging reports and other labs reviewed]     ---___---___---___---___---___---___ ---___---___---___---___---           <<<  A S S E S S M E N T   A N D   P L A N :  >>>    sepsis continue broad spectrum antibiotics  and daptomycin micafungin   asthma  on budesonide   dementia  supportive car e  anxiety on klonopin  seroquel   h/o mrsa  id following   dvt prophylaxis onm eliquis  chronic pain on medical marijuana  hypotension on iv fluid and pressors     -GI/DVT Prophylaxis.    --------------------------------------------  Case discussed with   Education given on     >>______________________<<      Deniz Bolden .         phone   [phone]

## 2019-03-01 NOTE — PROGRESS NOTE ADULT - SUBJECTIVE AND OBJECTIVE BOX
CC: f/u for pneumonia    Patient reports  nothing not verbal  REVIEW OF SYSTEMS:  All other review of systems cannot get (Comprehensive ROS)    Antimicrobials Day #  :  meropenem  IVPB 1000 milliGRAM(s) IV Intermittent every 12 hours  tigecycline IVPB        Other Medications Reviewed    T(F): 99.9 (19 @ 11:00), Max: 102.4 (19 @ 23:15)  HR: 93 (19 @ 16:00)  BP: 117/54 (19 @ 16:00)  RR: 20 (19 @ 16:00)  SpO2: 100% (19 @ 16:00)  Wt(kg): --  on pressors  PHYSICAL EXAM:  General: awake, contracted, no acute distress  Eyes:  anicteric, no conjunctival injection, no discharge  Oropharynx: no lesions or injection 	  Neck: supple, without adenopathy  Lungs: course  to auscultation  Heart: regular rate and rhythm; no murmur, rubs or gallops  Abdomen: soft, nondistended, nontender, without mass or organomegaly, peg  Skin: no lesions  Extremities: no clubbing, cyanosis, or edema, contracted  decubitus fairly clean  Neurologic: awake, contracted    LAB RESULTS:                        9.5    36.5  )-----------( 566      ( 01 Mar 2019 01:47 )             27.6     03-    141  |  112<H>  |  76<H>  ----------------------------<  153<H>  4.8   |  17<L>  |  0.84    Ca    8.9      01 Mar 2019 15:16  Phos  2.7     -  Mg     2.2     -    TPro  5.4<L>  /  Alb  2.6<L>  /  TBili  0.1<L>  /  DBili  x   /  AST  20  /  ALT  16  /  AlkPhos  84      LIVER FUNCTIONS - ( 01 Mar 2019 15:16 )  Alb: 2.6 g/dL / Pro: 5.4 g/dL / ALK PHOS: 84 U/L / ALT: 16 U/L / AST: 20 U/L / GGT: x           Urinalysis Basic - ( 2019 18:28 )    Color: Yellow / Appearance: Slightly Turbid / S.025 / pH: x  Gluc: x / Ketone: Trace  / Bili: Negative / Urobili: 2 mg/dL   Blood: x / Protein: 100 / Nitrite: Negative   Leuk Esterase: Large / RBC: 6 /hpf / WBC 55 /HPF   Sq Epi: x / Non Sq Epi: 5 / Bacteria: Few      MICROBIOLOGY:  RECENT CULTURES:   @ 06:07 .Blood Blood-Peripheral Blood Culture PCR    Growth in aerobic bottle: Gram Positive Cocci in Clusters  "Due to technical problems, Proteus sp. will Not be reported as part of  the BCID panel until further notice"  ***Blood Panel PCR results on this specimen are available  approximately 3 hours after the Gram stain result.***  Gram stain, PCR, and/or culture results may not always  correspond due to difference in methodologies.  ************************************************************  This PCR assay was performed using [employer].  The following targets are tested for: Enterococcus,  vancomycin resistant enterococci, Listeria monocytogenes,  coagulase negative staphylococci, S. aureus,  methicillin resistant S. aureus, Streptococcus agalactiae  (Group B), S. pneumoniae, S.pyogenes (Group A),  Acinetobacter baumannii, Enterobacter cloacae, E. coli,  Klebsiella oxytoca, K. pneumoniae, Proteus sp.,  Serratia marcescens, Haemophilus influenzae,  Neisseria meningitidis, Pseudomonas aeruginosa, Candida  albicans, C. glabrata, C krusei, C parapsilosis,  C. tropicalis and the KPC resistance gene.    Growth in aerobic bottle: Gram Positive Cocci in Clusters     @ 05:08 .Stool Feces     No Protozoa seen by trichrome stain  No Helminths or Protozoa seen in formalin concentrate  performed by iodine stain  (routine O+P not evaluated for Microsporidia,  Cryptosporidia, Cyclospora, or Isospora.)  Note: One negative specimen does not rule  out the possibility of a parasitic infection.       @ 03:27 .Stool Feces     GI PCR Results: NOT detected  *******Please Note:*******  GI panel PCR evaluates for:  Campylobacter, Plesiomonas shigelloides, Salmonella,  Vibrio, Yersinia enterocolitica, Enteroaggregative  Escherichia coli (EAEC), Enteropathogenic E.coli (EPEC),  Enterotoxigenic E. coli (ETEC) lt/st, Shiga-like  toxin-producing E. coli (STEC) stx1/stx2,  Shigella/ Enteroinvasive E. coli (EIEC), Cryptosporidium,  Cyclospora cayetanensis, Entamoeba histolytica,  Giardia lamblia, Adenovirus F 40/41, Astrovirus,  Norovirus GI/GII, Rotavirus A, Sapovirus       @ 03:25 .Stool Feces     No enteric pathogens isolated.  (Stool culture examined for Salmonella,  Shigella, Campylobacter, Aeromonas, Plesiomonas,  Vibrio, E.coli O157 and Yersinia)       @ 02:55 .Urine Clean Catch (Midstream)     >100,000 CFU/ml Gram Negative Rods  >100,000 CFU/ml Gram Positive Cocci in Pairs and Chains          RADIOLOGY REVIEWED:  < from: CT Chest No Cont (19 @ 16:32) >  IMPRESSION:    Patchy bilateral groundglass opacities, unchanged since 2016. No new   consolidations.    Incidental findings as above    < end of copied text >      Assessment:  Patient with advanced dementia, spent the past nearly year hospitalized with small interludes at home after infected right thr with mrsa bacteremia, sp leisa, spacer, multiple episodes of sepsis from uti and pneumonia, has been on suppressive antibiotics, has a stage 4 decube, has a chronic gavin, has a peg tube, is totally contracted now returns with severe sepsis, source not totally clear. Ground glass infiltrates may represent her likely chronic aspiration events so will give better mrsa coverage to the lungs than dapto affords. Positive blood culture is contaminant. Stool studies neg for cdif and other pathogens. Await abdomen ct to see if occult source there. Hip areas could be infected , doubt positive urine culture indicative of uti just gavin colonization  Plan: stop dapto and micafungin  continue meropenem  start tigecycline  follow up cultures  await ct abd and pelvis read  supportive care CC: f/u for pneumonia    Patient reports  nothing not verbal  REVIEW OF SYSTEMS:  All other review of systems cannot get (Comprehensive ROS)    Antimicrobials Day #  :  meropenem  IVPB 1000 milliGRAM(s) IV Intermittent every 12 hours  tigecycline IVPB        Other Medications Reviewed    T(F): 99.9 (19 @ 11:00), Max: 102.4 (19 @ 23:15)  HR: 93 (19 @ 16:00)  BP: 117/54 (19 @ 16:00)  RR: 20 (19 @ 16:00)  SpO2: 100% (19 @ 16:00)  Wt(kg): --  on pressors  PHYSICAL EXAM:  General: awake, contracted, no acute distress  Eyes:  anicteric, no conjunctival injection, no discharge  Oropharynx: no lesions or injection 	  Neck: supple, without adenopathy  Lungs: course  to auscultation  Heart: regular rate and rhythm; no murmur, rubs or gallops  Abdomen: soft, nondistended, nontender, without mass or organomegaly, peg  Skin: no lesions  Extremities: no clubbing, cyanosis, or edema, contracted  decubitus fairly clean  Neurologic: awake, contracted    LAB RESULTS:                        9.5    36.5  )-----------( 566      ( 01 Mar 2019 01:47 )             27.6     03-    141  |  112<H>  |  76<H>  ----------------------------<  153<H>  4.8   |  17<L>  |  0.84    Ca    8.9      01 Mar 2019 15:16  Phos  2.7     -  Mg     2.2     -    TPro  5.4<L>  /  Alb  2.6<L>  /  TBili  0.1<L>  /  DBili  x   /  AST  20  /  ALT  16  /  AlkPhos  84      LIVER FUNCTIONS - ( 01 Mar 2019 15:16 )  Alb: 2.6 g/dL / Pro: 5.4 g/dL / ALK PHOS: 84 U/L / ALT: 16 U/L / AST: 20 U/L / GGT: x           Urinalysis Basic - ( 2019 18:28 )    Color: Yellow / Appearance: Slightly Turbid / S.025 / pH: x  Gluc: x / Ketone: Trace  / Bili: Negative / Urobili: 2 mg/dL   Blood: x / Protein: 100 / Nitrite: Negative   Leuk Esterase: Large / RBC: 6 /hpf / WBC 55 /HPF   Sq Epi: x / Non Sq Epi: 5 / Bacteria: Few      MICROBIOLOGY:  RECENT CULTURES:   @ 06:07 .Blood Blood-Peripheral Blood Culture PCR    Growth in aerobic bottle: Gram Positive Cocci in Clusters  "Due to technical problems, Proteus sp. will Not be reported as part of  the BCID panel until further notice"  ***Blood Panel PCR results on this specimen are available  approximately 3 hours after the Gram stain result.***  Gram stain, PCR, and/or culture results may not always  correspond due to difference in methodologies.  ************************************************************  This PCR assay was performed using [employer].  The following targets are tested for: Enterococcus,  vancomycin resistant enterococci, Listeria monocytogenes,  coagulase negative staphylococci, S. aureus,  methicillin resistant S. aureus, Streptococcus agalactiae  (Group B), S. pneumoniae, S.pyogenes (Group A),  Acinetobacter baumannii, Enterobacter cloacae, E. coli,  Klebsiella oxytoca, K. pneumoniae, Proteus sp.,  Serratia marcescens, Haemophilus influenzae,  Neisseria meningitidis, Pseudomonas aeruginosa, Candida  albicans, C. glabrata, C krusei, C parapsilosis,  C. tropicalis and the KPC resistance gene.    Growth in aerobic bottle: Gram Positive Cocci in Clusters     @ 05:08 .Stool Feces     No Protozoa seen by trichrome stain  No Helminths or Protozoa seen in formalin concentrate  performed by iodine stain  (routine O+P not evaluated for Microsporidia,  Cryptosporidia, Cyclospora, or Isospora.)  Note: One negative specimen does not rule  out the possibility of a parasitic infection.       @ 03:27 .Stool Feces     GI PCR Results: NOT detected  *******Please Note:*******  GI panel PCR evaluates for:  Campylobacter, Plesiomonas shigelloides, Salmonella,  Vibrio, Yersinia enterocolitica, Enteroaggregative  Escherichia coli (EAEC), Enteropathogenic E.coli (EPEC),  Enterotoxigenic E. coli (ETEC) lt/st, Shiga-like  toxin-producing E. coli (STEC) stx1/stx2,  Shigella/ Enteroinvasive E. coli (EIEC), Cryptosporidium,  Cyclospora cayetanensis, Entamoeba histolytica,  Giardia lamblia, Adenovirus F 40/41, Astrovirus,  Norovirus GI/GII, Rotavirus A, Sapovirus       @ 03:25 .Stool Feces     No enteric pathogens isolated.  (Stool culture examined for Salmonella,  Shigella, Campylobacter, Aeromonas, Plesiomonas,  Vibrio, E.coli O157 and Yersinia)       @ 02:55 .Urine Clean Catch (Midstream)     >100,000 CFU/ml Gram Negative Rods  >100,000 CFU/ml Gram Positive Cocci in Pairs and Chains          RADIOLOGY REVIEWED:  < from: CT Chest No Cont (19 @ 16:32) >  IMPRESSION:    Patchy bilateral groundglass opacities, unchanged since 2016. No new   consolidations.    Incidental findings as above    < end of copied text >      Assessment:  Patient with advanced dementia, spent the past nearly year hospitalized with small interludes at home after infected right thr with mrsa bacteremia, sp leisa, spacer, multiple episodes of sepsis from uti and pneumonia, has been on suppressive antibiotics, has a stage 4 decube, has a chronic gavin, has a peg tube, is totally contracted now returns with severe sepsis, source not totally clear. Ground glass infiltrates may represent her likely chronic aspiration events so will give better mrsa coverage to the lungs than dapto affords. Her decubitus looks clean, Positive blood culture is contaminant. Stool studies neg for cdif and other pathogens. Await abdomen ct to see if occult source there. Hip areas could be infected , doubt positive urine culture indicative of uti just gavin colonization  Plan: stop dapto and micafungin  continue meropenem  start tigecycline  follow up cultures  await ct abd and pelvis read  supportive care

## 2019-03-01 NOTE — PHARMACOTHERAPY INTERVENTION NOTE - COMMENTS
Patients current CrCl is 30 mL/min and is receiving meropenem IV 1g Q8h. Recommended MD to renally dose meropenem to 1g Q12h.   Dionna Bowen PharmD  PGY1 Pharmacy Resident (Pager# [phone])

## 2019-03-02 LAB
ALBUMIN SERPL ELPH-MCNC: 2.5 G/DL — LOW (ref 3.3–5)
ALP SERPL-CCNC: 80 U/L — SIGNIFICANT CHANGE UP (ref 40–120)
ALT FLD-CCNC: 17 U/L — SIGNIFICANT CHANGE UP (ref 10–45)
ANION GAP SERPL CALC-SCNC: 13 MMOL/L — SIGNIFICANT CHANGE UP (ref 5–17)
APTT BLD: 37.4 SEC — HIGH (ref 27.5–36.3)
AST SERPL-CCNC: 22 U/L — SIGNIFICANT CHANGE UP (ref 10–40)
BASE EXCESS BLDV CALC-SCNC: -4.7 MMOL/L — LOW (ref -2–2)
BILIRUB SERPL-MCNC: 0.1 MG/DL — LOW (ref 0.2–1.2)
BUN SERPL-MCNC: 68 MG/DL — HIGH (ref 7–23)
CA-I BLD-SCNC: 1.31 MMOL/L — HIGH (ref 1.12–1.3)
CA-I SERPL-SCNC: 1.28 MMOL/L — SIGNIFICANT CHANGE UP (ref 1.12–1.3)
CALCIUM SERPL-MCNC: 9 MG/DL — SIGNIFICANT CHANGE UP (ref 8.4–10.5)
CHLORIDE BLDV-SCNC: 116 MMOL/L — HIGH (ref 96–108)
CHLORIDE SERPL-SCNC: 113 MMOL/L — HIGH (ref 96–108)
CO2 BLDV-SCNC: 21 MMOL/L — LOW (ref 22–30)
CO2 SERPL-SCNC: 17 MMOL/L — LOW (ref 22–31)
CREAT SERPL-MCNC: 0.74 MG/DL — SIGNIFICANT CHANGE UP (ref 0.5–1.3)
CULTURE RESULTS: SIGNIFICANT CHANGE UP
GAS PNL BLDV: 139 MMOL/L — SIGNIFICANT CHANGE UP (ref 136–145)
GAS PNL BLDV: SIGNIFICANT CHANGE UP
GAS PNL BLDV: SIGNIFICANT CHANGE UP
GLUCOSE BLDC GLUCOMTR-MCNC: 101 MG/DL — HIGH (ref 70–99)
GLUCOSE BLDC GLUCOMTR-MCNC: 109 MG/DL — HIGH (ref 70–99)
GLUCOSE BLDC GLUCOMTR-MCNC: 119 MG/DL — HIGH (ref 70–99)
GLUCOSE BLDC GLUCOMTR-MCNC: 131 MG/DL — HIGH (ref 70–99)
GLUCOSE BLDC GLUCOMTR-MCNC: 159 MG/DL — HIGH (ref 70–99)
GLUCOSE BLDC GLUCOMTR-MCNC: 199 MG/DL — HIGH (ref 70–99)
GLUCOSE BLDC GLUCOMTR-MCNC: 37 MG/DL — CRITICAL LOW (ref 70–99)
GLUCOSE BLDC GLUCOMTR-MCNC: 43 MG/DL — CRITICAL LOW (ref 70–99)
GLUCOSE BLDC GLUCOMTR-MCNC: 83 MG/DL — SIGNIFICANT CHANGE UP (ref 70–99)
GLUCOSE BLDC GLUCOMTR-MCNC: 98 MG/DL — SIGNIFICANT CHANGE UP (ref 70–99)
GLUCOSE BLDV-MCNC: 118 MG/DL — HIGH (ref 70–99)
GLUCOSE SERPL-MCNC: 120 MG/DL — HIGH (ref 70–99)
HCO3 BLDV-SCNC: 20 MMOL/L — LOW (ref 21–29)
HCT VFR BLD CALC: 25.9 % — LOW (ref 34.5–45)
HCT VFR BLDA CALC: 27 % — LOW (ref 39–50)
HGB BLD CALC-MCNC: 8.7 G/DL — LOW (ref 11.5–15.5)
HGB BLD-MCNC: 8.9 G/DL — LOW (ref 11.5–15.5)
HOROWITZ INDEX BLDV+IHG-RTO: 21 — SIGNIFICANT CHANGE UP
INR BLD: 1.57 RATIO — HIGH (ref 0.88–1.16)
LACTATE BLDV-MCNC: 0.8 MMOL/L — SIGNIFICANT CHANGE UP (ref 0.7–2)
MAGNESIUM SERPL-MCNC: 2.1 MG/DL — SIGNIFICANT CHANGE UP (ref 1.6–2.6)
MCHC RBC-ENTMCNC: 30.9 PG — SIGNIFICANT CHANGE UP (ref 27–34)
MCHC RBC-ENTMCNC: 34.2 GM/DL — SIGNIFICANT CHANGE UP (ref 32–36)
MCV RBC AUTO: 90.4 FL — SIGNIFICANT CHANGE UP (ref 80–100)
ORGANISM # SPEC MICROSCOPIC CNT: SIGNIFICANT CHANGE UP
ORGANISM # SPEC MICROSCOPIC CNT: SIGNIFICANT CHANGE UP
OTHER CELLS CSF MANUAL: 7 ML/DL — LOW (ref 18–22)
PCO2 BLDV: 38 MMHG — SIGNIFICANT CHANGE UP (ref 35–50)
PH BLDV: 7.34 — LOW (ref 7.35–7.45)
PHOSPHATE SERPL-MCNC: 2.4 MG/DL — LOW (ref 2.5–4.5)
PLATELET # BLD AUTO: 347 K/UL — SIGNIFICANT CHANGE UP (ref 150–400)
PO2 BLDV: 34 MMHG — SIGNIFICANT CHANGE UP (ref 25–45)
POTASSIUM BLDV-SCNC: 4.2 MMOL/L — SIGNIFICANT CHANGE UP (ref 3.5–5.3)
POTASSIUM SERPL-MCNC: 4.5 MMOL/L — SIGNIFICANT CHANGE UP (ref 3.5–5.3)
POTASSIUM SERPL-SCNC: 4.5 MMOL/L — SIGNIFICANT CHANGE UP (ref 3.5–5.3)
PROT SERPL-MCNC: 5.2 G/DL — LOW (ref 6–8.3)
PROTHROM AB SERPL-ACNC: 18.1 SEC — HIGH (ref 10–12.9)
RBC # BLD: 2.87 M/UL — LOW (ref 3.8–5.2)
RBC # FLD: 13.1 % — SIGNIFICANT CHANGE UP (ref 10.3–14.5)
SAO2 % BLDV: 63 % — LOW (ref 67–88)
SODIUM SERPL-SCNC: 143 MMOL/L — SIGNIFICANT CHANGE UP (ref 135–145)
SPECIMEN SOURCE: SIGNIFICANT CHANGE UP
WBC # BLD: 22.9 K/UL — HIGH (ref 3.8–10.5)
WBC # FLD AUTO: 22.9 K/UL — HIGH (ref 3.8–10.5)

## 2019-03-02 PROCEDURE — 74176 CT ABD & PELVIS W/O CONTRAST: CPT | Mod: 26

## 2019-03-02 PROCEDURE — 99233 SBSQ HOSP IP/OBS HIGH 50: CPT | Mod: GC

## 2019-03-02 RX ORDER — INSULIN LISPRO 100/ML
VIAL (ML) SUBCUTANEOUS EVERY 4 HOURS
Qty: 0 | Refills: 0 | Status: DISCONTINUED | OUTPATIENT
Start: 2019-03-02 | End: 2019-03-04

## 2019-03-02 RX ORDER — DEXTROSE 50 % IN WATER 50 %
25 SYRINGE (ML) INTRAVENOUS ONCE
Qty: 0 | Refills: 0 | Status: DISCONTINUED | OUTPATIENT
Start: 2019-03-02 | End: 2019-03-18

## 2019-03-02 RX ORDER — POTASSIUM PHOSPHATE, MONOBASIC POTASSIUM PHOSPHATE, DIBASIC 236; 224 MG/ML; MG/ML
15 INJECTION, SOLUTION INTRAVENOUS ONCE
Qty: 0 | Refills: 0 | Status: COMPLETED | OUTPATIENT
Start: 2019-03-02 | End: 2019-03-02

## 2019-03-02 RX ORDER — DEXTROSE 50 % IN WATER 50 %
12.5 SYRINGE (ML) INTRAVENOUS ONCE
Qty: 0 | Refills: 0 | Status: DISCONTINUED | OUTPATIENT
Start: 2019-03-02 | End: 2019-03-18

## 2019-03-02 RX ORDER — GLUCAGON INJECTION, SOLUTION 0.5 MG/.1ML
1 INJECTION, SOLUTION SUBCUTANEOUS ONCE
Qty: 0 | Refills: 0 | Status: DISCONTINUED | OUTPATIENT
Start: 2019-03-02 | End: 2019-03-18

## 2019-03-02 RX ORDER — POTASSIUM PHOSPHATE, MONOBASIC POTASSIUM PHOSPHATE, DIBASIC 236; 224 MG/ML; MG/ML
15 INJECTION, SOLUTION INTRAVENOUS ONCE
Qty: 0 | Refills: 0 | Status: DISCONTINUED | OUTPATIENT
Start: 2019-03-02 | End: 2019-03-02

## 2019-03-02 RX ORDER — SODIUM CHLORIDE 9 MG/ML
1000 INJECTION, SOLUTION INTRAVENOUS
Qty: 0 | Refills: 0 | Status: DISCONTINUED | OUTPATIENT
Start: 2019-03-02 | End: 2019-03-18

## 2019-03-02 RX ORDER — DEXTROSE 50 % IN WATER 50 %
15 SYRINGE (ML) INTRAVENOUS ONCE
Qty: 0 | Refills: 0 | Status: DISCONTINUED | OUTPATIENT
Start: 2019-03-02 | End: 2019-03-18

## 2019-03-02 RX ADMIN — MIDODRINE HYDROCHLORIDE 10 MILLIGRAM(S): 2.5 TABLET ORAL at 05:46

## 2019-03-02 RX ADMIN — Medication 50 MILLILITER(S): at 00:16

## 2019-03-02 RX ADMIN — MIDODRINE HYDROCHLORIDE 10 MILLIGRAM(S): 2.5 TABLET ORAL at 22:49

## 2019-03-02 RX ADMIN — MEROPENEM 100 MILLIGRAM(S): 1 INJECTION INTRAVENOUS at 20:43

## 2019-03-02 RX ADMIN — Medication 1: at 10:59

## 2019-03-02 RX ADMIN — Medication 7.5 MILLIGRAM(S): at 05:46

## 2019-03-02 RX ADMIN — FAMOTIDINE 20 MILLIGRAM(S): 10 INJECTION INTRAVENOUS at 12:15

## 2019-03-02 RX ADMIN — GABAPENTIN 300 MILLIGRAM(S): 400 CAPSULE ORAL at 22:51

## 2019-03-02 RX ADMIN — TIGECYCLINE 105 MILLIGRAM(S): 50 INJECTION, POWDER, LYOPHILIZED, FOR SOLUTION INTRAVENOUS at 18:38

## 2019-03-02 RX ADMIN — MEROPENEM 100 MILLIGRAM(S): 1 INJECTION INTRAVENOUS at 05:47

## 2019-03-02 RX ADMIN — POTASSIUM PHOSPHATE, MONOBASIC POTASSIUM PHOSPHATE, DIBASIC 62.5 MILLIMOLE(S): 236; 224 INJECTION, SOLUTION INTRAVENOUS at 03:19

## 2019-03-02 RX ADMIN — Medication 1 APPLICATION(S): at 05:47

## 2019-03-02 RX ADMIN — Medication 300 MILLIGRAM(S): at 12:15

## 2019-03-02 RX ADMIN — QUETIAPINE FUMARATE 25 MILLIGRAM(S): 200 TABLET, FILM COATED ORAL at 22:49

## 2019-03-02 RX ADMIN — APIXABAN 5 MILLIGRAM(S): 2.5 TABLET, FILM COATED ORAL at 18:38

## 2019-03-02 RX ADMIN — Medication 1.5 MILLIGRAM(S): at 22:49

## 2019-03-02 RX ADMIN — TIGECYCLINE 105 MILLIGRAM(S): 50 INJECTION, POWDER, LYOPHILIZED, FOR SOLUTION INTRAVENOUS at 05:48

## 2019-03-02 RX ADMIN — GABAPENTIN 300 MILLIGRAM(S): 400 CAPSULE ORAL at 05:45

## 2019-03-02 RX ADMIN — APIXABAN 5 MILLIGRAM(S): 2.5 TABLET, FILM COATED ORAL at 05:46

## 2019-03-02 RX ADMIN — Medication 1 DROP(S): at 18:38

## 2019-03-02 RX ADMIN — MIDODRINE HYDROCHLORIDE 10 MILLIGRAM(S): 2.5 TABLET ORAL at 13:48

## 2019-03-02 RX ADMIN — Medication 1 DROP(S): at 12:15

## 2019-03-02 RX ADMIN — Medication 1 APPLICATION(S): at 18:38

## 2019-03-02 NOTE — CHART NOTE - NSCHARTNOTEFT_GEN_A_CORE
MICU Transfer Note    Transfer from: MICU  Transfer to:  (  ) Medicine    (  ) Telemetry    (  ) RCU    (  ) Palliative    (  ) Stroke Unit    (  ) _______________  Accepting physican: Dr. 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    MICU course: Pressors were continued, she was found to have leukocytosis in the 30s, placed on meropenem for coverage. As per ID recs, daptomycin and micafungin were discontinued and tigecycline was added onto meropenem. A central line was placed due to increasing pressor requirement.           ASSESSMENT & PLAN:         For Follow-Up:          Vital Signs Last 24 Hrs  T(C): 37.2 (02 Mar 2019 11:00), Max: 37.9 (01 Mar 2019 19:00)  T(F): 99 (02 Mar 2019 11:00), Max: 100.3 (01 Mar 2019 19:00)  HR: 87 (02 Mar 2019 13:00) (87 - 116)  BP: 110/63 (02 Mar 2019 13:00) (77/47 - 142/96)  BP(mean): 81 (02 Mar 2019 13:00) (57 - 104)  RR: 17 (02 Mar 2019 13:00) (17 - 39)  SpO2: 100% (02 Mar 2019 13:00) (97% - 100%)  I&O's Summary    01 Mar 2019 07:01  -  02 Mar 2019 07:00  --------------------------------------------------------  IN: 1839.1 mL / OUT: 915 mL / NET: 924.1 mL    02 Mar 2019 07:01  -  02 Mar 2019 14:48  --------------------------------------------------------  IN: 340 mL / OUT: 445 mL / NET: -105 mL          MEDICATIONS  (STANDING):  ALBUTerol    90 MICROgram(s) HFA Inhaler 1 Puff(s) Inhalation every 4 hours  apixaban 5 milliGRAM(s) Oral every 12 hours  artificial  tears Solution 1 Drop(s) Both EYES every 6 hours  clonazePAM Tablet 1.5 milliGRAM(s) Oral at bedtime  Dakins Solution - 1/4 Strength 1 Application(s) Topical two times a day  famotidine    Tablet 20 milliGRAM(s) Oral daily  ferrous    sulfate Liquid 300 milliGRAM(s) Oral daily  gabapentin   Solution 300 milliGRAM(s) Oral two times a day  insulin lispro (HumaLOG) corrective regimen sliding scale   SubCutaneous three times a day before meals  insulin lispro (HumaLOG) corrective regimen sliding scale   SubCutaneous at bedtime  Medical Marijuana Oil 1 milliLiter(s),Medical Marijuana Oil 1 milliLiter(s),Medical Marijuana Oil 1 milliLiter(s) 1 milliLiter(s) Oral <User Schedule>  meropenem  IVPB 1000 milliGRAM(s) IV Intermittent every 12 hours  midodrine 10 milliGRAM(s) Oral three times a day  phenylephrine    Infusion 1 MICROgram(s)/kG/Min (13.425 mL/Hr) IV Continuous <Continuous>  predniSONE   Tablet 7.5 milliGRAM(s) Oral daily  QUEtiapine 25 milliGRAM(s) Oral at bedtime  tigecycline IVPB 50 milliGRAM(s) IV Intermittent every 12 hours  tigecycline IVPB      tiotropium 18 MICROgram(s) Capsule 1 Capsule(s) Inhalation daily    MEDICATIONS  (PRN):  acetaminophen    Suspension .. 650 milliGRAM(s) Oral every 6 hours PRN Temp greater or equal to 38C (100.4F)        LABS                                            8.9                   Neurophils% (auto):   x      (03-02 @ 00:24):    22.9 )-----------(347          Lymphocytes% (auto):  x                                             25.9                   Eosinphils% (auto):   x        Manual%: Neutrophils x    ; Lymphocytes x    ; Eosinophils x    ; Bands%: x    ; Blasts x                                    143    |  113    |  68                  Calcium: 9.0   / iCa: 1.31   (03-02 @ 00:24)    ----------------------------<  120       Magnesium: 2.1                              4.5     |  17     |  0.74             Phosphorous: 2.4      TPro  5.2    /  Alb  2.5    /  TBili  0.1    /  DBili  x      /  AST  22     /  ALT  17     /  AlkPhos  80     02 Mar 2019 00:24    ( 03-02 @ 00:24 )   PT: 18.1 sec;   INR: 1.57 ratio  aPTT: 37.4 sec MICU Transfer Note    Transfer from: MICU  Transfer to:  ( * ) Medicine    (  ) Telemetry    (  ) RCU    (  ) Palliative    (  ) Stroke Unit    (  ) _______________  Accepting physican: Dr. 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    MICU course: Pressors were continued, she was found to have leukocytosis in the 30s, placed on meropenem for coverage. As per ID recs, daptomycin and micafungin were discontinued and tigecycline was added onto meropenem. A central line was placed due to increasing pressor requirement. On antibiotics, patient's fever curve and leukocytosis improved and was able to be transitioned from IV pressors to midodrine. Blood cultures were negative. CT chest was unchanged from one prior to admission, CT abdomen/pelvis with oral contrast showed no acute intra-abdominal collection or pathology that could explain her sepsis; her known left sacral decub ulcer was noted to be "without definite cortical erosion of underlying sacrum."          ASSESSMENT & PLAN:         For Follow-Up:          Vital Signs Last 24 Hrs  T(C): 37.2 (02 Mar 2019 11:00), Max: 37.9 (01 Mar 2019 19:00)  T(F): 99 (02 Mar 2019 11:00), Max: 100.3 (01 Mar 2019 19:00)  HR: 87 (02 Mar 2019 13:00) (87 - 116)  BP: 110/63 (02 Mar 2019 13:00) (77/47 - 142/96)  BP(mean): 81 (02 Mar 2019 13:00) (57 - 104)  RR: 17 (02 Mar 2019 13:00) (17 - 39)  SpO2: 100% (02 Mar 2019 13:00) (97% - 100%)  I&O's Summary    01 Mar 2019 07:01  -  02 Mar 2019 07:00  --------------------------------------------------------  IN: 1839.1 mL / OUT: 915 mL / NET: 924.1 mL    02 Mar 2019 07:01  -  02 Mar 2019 14:48  --------------------------------------------------------  IN: 340 mL / OUT: 445 mL / NET: -105 mL          MEDICATIONS  (STANDING):  ALBUTerol    90 MICROgram(s) HFA Inhaler 1 Puff(s) Inhalation every 4 hours  apixaban 5 milliGRAM(s) Oral every 12 hours  artificial  tears Solution 1 Drop(s) Both EYES every 6 hours  clonazePAM Tablet 1.5 milliGRAM(s) Oral at bedtime  Dakins Solution - 1/4 Strength 1 Application(s) Topical two times a day  famotidine    Tablet 20 milliGRAM(s) Oral daily  ferrous    sulfate Liquid 300 milliGRAM(s) Oral daily  gabapentin   Solution 300 milliGRAM(s) Oral two times a day  insulin lispro (HumaLOG) corrective regimen sliding scale   SubCutaneous three times a day before meals  insulin lispro (HumaLOG) corrective regimen sliding scale   SubCutaneous at bedtime  Medical Marijuana Oil 1 milliLiter(s),Medical Marijuana Oil 1 milliLiter(s),Medical Marijuana Oil 1 milliLiter(s) 1 milliLiter(s) Oral <User Schedule>  meropenem  IVPB 1000 milliGRAM(s) IV Intermittent every 12 hours  midodrine 10 milliGRAM(s) Oral three times a day  phenylephrine    Infusion 1 MICROgram(s)/kG/Min (13.425 mL/Hr) IV Continuous <Continuous>  predniSONE   Tablet 7.5 milliGRAM(s) Oral daily  QUEtiapine 25 milliGRAM(s) Oral at bedtime  tigecycline IVPB 50 milliGRAM(s) IV Intermittent every 12 hours  tigecycline IVPB      tiotropium 18 MICROgram(s) Capsule 1 Capsule(s) Inhalation daily    MEDICATIONS  (PRN):  acetaminophen    Suspension .. 650 milliGRAM(s) Oral every 6 hours PRN Temp greater or equal to 38C (100.4F)        LABS                                            8.9                   Neurophils% (auto):   x      (03-02 @ 00:24):    22.9 )-----------(347          Lymphocytes% (auto):  x                                             25.9                   Eosinphils% (auto):   x        Manual%: Neutrophils x    ; Lymphocytes x    ; Eosinophils x    ; Bands%: x    ; Blasts x                                    143    |  113    |  68                  Calcium: 9.0   / iCa: 1.31   (03-02 @ 00:24)    ----------------------------<  120       Magnesium: 2.1                              4.5     |  17     |  0.74             Phosphorous: 2.4      TPro  5.2    /  Alb  2.5    /  TBili  0.1    /  DBili  x      /  AST  22     /  ALT  17     /  AlkPhos  80     02 Mar 2019 00:24    ( 03-02 @ 00:24 )   PT: 18.1 sec;   INR: 1.57 ratio  aPTT: 37.4 sec MICU Transfer Note    Transfer from: MICU  Transfer to:  ( * ) Medicine    (  ) Telemetry    (  ) RCU    (  ) Palliative    (  ) Stroke Unit    (  ) _______________  Accepting physican: Dr. 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    MICU course: Pressors were continued, she was found to have leukocytosis in the 30s, placed on meropenem for coverage. As per ID recs, daptomycin and micafungin were discontinued and tigecycline was added onto meropenem. A central line was placed due to increasing pressor requirement. On antibiotics, patient's fever curve and leukocytosis improved and was able to be transitioned from IV pressors to midodrine. Blood cultures were negative. CT chest was unchanged from one prior to admission, CT abdomen/pelvis with oral contrast showed no acute intra-abdominal collection or pathology that could explain her sepsis; her known left sacral decub ulcer was noted to be "without definite cortical erosion of underlying sacrum." Her Tube feeds were changed to bolus feeds from continuous feeds as her  noted that having continuous feeds requires her to stay upright throughout the day which causes severe spasms and contracture.       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 c/b R hip MRSA infection s/p PEG and chronic gavin, presumed asthma on steroids, multiple recent prolonged hospitalizations, presented to the hospital on 2/26 with fevers to 103 as well as cough and loose BMs, found to be septic likely 2/2 UTI vs sacral ulcer vs PNA, course c/b septic shock admitted to MICU for pressor support        For Follow-Up:  - Consider MRI of Abd/Pelvis to r/o osteomyelitis as source of patient's sepsis  - c/w antibiotics as per ID recs  - f/u ID recs      Vital Signs Last 24 Hrs  T(C): 37.2 (02 Mar 2019 11:00), Max: 37.9 (01 Mar 2019 19:00)  T(F): 99 (02 Mar 2019 11:00), Max: 100.3 (01 Mar 2019 19:00)  HR: 87 (02 Mar 2019 13:00) (87 - 116)  BP: 110/63 (02 Mar 2019 13:00) (77/47 - 142/96)  BP(mean): 81 (02 Mar 2019 13:00) (57 - 104)  RR: 17 (02 Mar 2019 13:00) (17 - 39)  SpO2: 100% (02 Mar 2019 13:00) (97% - 100%)  I&O's Summary    01 Mar 2019 07:01  -  02 Mar 2019 07:00  --------------------------------------------------------  IN: 1839.1 mL / OUT: 915 mL / NET: 924.1 mL    02 Mar 2019 07:01  -  02 Mar 2019 14:48  --------------------------------------------------------  IN: 340 mL / OUT: 445 mL / NET: -105 mL          MEDICATIONS  (STANDING):  ALBUTerol    90 MICROgram(s) HFA Inhaler 1 Puff(s) Inhalation every 4 hours  apixaban 5 milliGRAM(s) Oral every 12 hours  artificial  tears Solution 1 Drop(s) Both EYES every 6 hours  clonazePAM Tablet 1.5 milliGRAM(s) Oral at bedtime  Dakins Solution - 1/4 Strength 1 Application(s) Topical two times a day  famotidine    Tablet 20 milliGRAM(s) Oral daily  ferrous    sulfate Liquid 300 milliGRAM(s) Oral daily  gabapentin   Solution 300 milliGRAM(s) Oral two times a day  insulin lispro (HumaLOG) corrective regimen sliding scale   SubCutaneous three times a day before meals  insulin lispro (HumaLOG) corrective regimen sliding scale   SubCutaneous at bedtime  Medical Marijuana Oil 1 milliLiter(s),Medical Marijuana Oil 1 milliLiter(s),Medical Marijuana Oil 1 milliLiter(s) 1 milliLiter(s) Oral <User Schedule>  meropenem  IVPB 1000 milliGRAM(s) IV Intermittent every 12 hours  midodrine 10 milliGRAM(s) Oral three times a day  predniSONE   Tablet 7.5 milliGRAM(s) Oral daily  QUEtiapine 25 milliGRAM(s) Oral at bedtime  tigecycline IVPB 50 milliGRAM(s) IV Intermittent every 12 hours  tigecycline IVPB      tiotropium 18 MICROgram(s) Capsule 1 Capsule(s) Inhalation daily      MEDICATIONS  (PRN):  acetaminophen    Suspension .. 650 milliGRAM(s) Oral every 6 hours PRN Temp greater or equal to 38C (100.4F)        LABS                                            8.9                   Neurophils% (auto):   x      (03-02 @ 00:24):    22.9 )-----------(347          Lymphocytes% (auto):  x                                             25.9                   Eosinphils% (auto):   x        Manual%: Neutrophils x    ; Lymphocytes x    ; Eosinophils x    ; Bands%: x    ; Blasts x                                    143    |  113    |  68                  Calcium: 9.0   / iCa: 1.31   (03-02 @ 00:24)    ----------------------------<  120       Magnesium: 2.1                              4.5     |  17     |  0.74             Phosphorous: 2.4      TPro  5.2    /  Alb  2.5    /  TBili  0.1    /  DBili  x      /  AST  22     /  ALT  17     /  AlkPhos  80     02 Mar 2019 00:24    ( 03-02 @ 00:24 )   PT: 18.1 sec;   INR: 1.57 ratio  aPTT: 37.4 sec MICU Transfer Note    Transfer from: MICU  Transfer to:  ( * ) Medicine    (  ) Telemetry    (  ) RCU    (  ) Palliative    (  ) Stroke Unit    (  ) _______________  Accepting physican: Dr. 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    Infectious disease history:  "She has spent the greater past of past 9 months in the hospital.  She had difficult to control MRSA bacteremia, finally controlled after RT hip was debrided and explanted.  s/p multiple treatment for pneumonia, stage 4 decubitus ,peg, and chronic gavin.  She also has a chronic DVT and had unexplained fevers x 6 weeks during last hospital stay, with negative CT of C/A/P and negative cultures  1/4 bottles with a coag negative staph from 2/27, likely a contaminant. CDT is negative  CT chest; patchy b/l groundglass opacities, no new consolidations"    MICU course: Pressors were continued, she was found to have leukocytosis in the 30s, placed on meropenem for coverage. As per ID recs, daptomycin and micafungin were discontinued and tigecycline was added onto meropenem. A central line was placed due to increasing pressor requirement. On antibiotics, patient's fever curve and leukocytosis improved and was able to be transitioned from IV pressors to midodrine. Blood cultures were negative. CT chest was unchanged from one prior to admission, CT abdomen/pelvis with oral contrast showed no acute intra-abdominal collection or pathology that could explain her sepsis; her known left sacral decub ulcer was noted to be "without definite cortical erosion of underlying sacrum." Her Tube feeds were changed to bolus feeds from continuous feeds as her  noted that having continuous feeds requires her to stay upright throughout the day which causes severe spasms and contracture.       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 c/b R hip MRSA infection s/p PEG and chronic gavin, presumed asthma on steroids, multiple recent prolonged hospitalizations, presented to the hospital on 2/26 with fevers to 103 as well as cough and loose BMs, found to be septic likely 2/2 UTI vs sacral ulcer vs PNA, course c/b septic shock admitted to MICU for pressor support        For Follow-Up:  - Consider MRI of Abd/Pelvis to r/o osteomyelitis as source of patient's sepsis  - c/w antibiotics as per ID recs  - Wound care consult for Sacral decubitus ulcer  - f/u ID recs      Vital Signs Last 24 Hrs  T(C): 37.2 (02 Mar 2019 11:00), Max: 37.9 (01 Mar 2019 19:00)  T(F): 99 (02 Mar 2019 11:00), Max: 100.3 (01 Mar 2019 19:00)  HR: 87 (02 Mar 2019 13:00) (87 - 116)  BP: 110/63 (02 Mar 2019 13:00) (77/47 - 142/96)  BP(mean): 81 (02 Mar 2019 13:00) (57 - 104)  RR: 17 (02 Mar 2019 13:00) (17 - 39)  SpO2: 100% (02 Mar 2019 13:00) (97% - 100%)  I&O's Summary    01 Mar 2019 07:01  -  02 Mar 2019 07:00  --------------------------------------------------------  IN: 1839.1 mL / OUT: 915 mL / NET: 924.1 mL    02 Mar 2019 07:01  -  02 Mar 2019 14:48  --------------------------------------------------------  IN: 340 mL / OUT: 445 mL / NET: -105 mL          MEDICATIONS  (STANDING):  ALBUTerol    90 MICROgram(s) HFA Inhaler 1 Puff(s) Inhalation every 4 hours  apixaban 5 milliGRAM(s) Oral every 12 hours  artificial  tears Solution 1 Drop(s) Both EYES every 6 hours  clonazePAM Tablet 1.5 milliGRAM(s) Oral at bedtime  Dakins Solution - 1/4 Strength 1 Application(s) Topical two times a day  famotidine    Tablet 20 milliGRAM(s) Oral daily  ferrous    sulfate Liquid 300 milliGRAM(s) Oral daily  gabapentin   Solution 300 milliGRAM(s) Oral two times a day  insulin lispro (HumaLOG) corrective regimen sliding scale   SubCutaneous three times a day before meals  insulin lispro (HumaLOG) corrective regimen sliding scale   SubCutaneous at bedtime  Medical Marijuana Oil 1 milliLiter(s),Medical Marijuana Oil 1 milliLiter(s),Medical Marijuana Oil 1 milliLiter(s) 1 milliLiter(s) Oral <User Schedule>  meropenem  IVPB 1000 milliGRAM(s) IV Intermittent every 12 hours  midodrine 10 milliGRAM(s) Oral three times a day  predniSONE   Tablet 7.5 milliGRAM(s) Oral daily  QUEtiapine 25 milliGRAM(s) Oral at bedtime  tigecycline IVPB 50 milliGRAM(s) IV Intermittent every 12 hours  tigecycline IVPB      tiotropium 18 MICROgram(s) Capsule 1 Capsule(s) Inhalation daily      MEDICATIONS  (PRN):  acetaminophen    Suspension .. 650 milliGRAM(s) Oral every 6 hours PRN Temp greater or equal to 38C (100.4F)        LABS                                            8.9                   Neurophils% (auto):   x      (03-02 @ 00:24):    22.9 )-----------(347          Lymphocytes% (auto):  x                                             25.9                   Eosinphils% (auto):   x        Manual%: Neutrophils x    ; Lymphocytes x    ; Eosinophils x    ; Bands%: x    ; Blasts x                                    143    |  113    |  68                  Calcium: 9.0   / iCa: 1.31   (03-02 @ 00:24)    ----------------------------<  120       Magnesium: 2.1                              4.5     |  17     |  0.74             Phosphorous: 2.4      TPro  5.2    /  Alb  2.5    /  TBili  0.1    /  DBili  x      /  AST  22     /  ALT  17     /  AlkPhos  80     02 Mar 2019 00:24    ( 03-02 @ 00:24 )   PT: 18.1 sec;   INR: 1.57 ratio  aPTT: 37.4 sec MICU Transfer Note    Transfer from: MICU  Transfer to:  ( * ) Medicine    (  ) Telemetry    (  ) RCU    (  ) Palliative    (  ) Stroke Unit    (  ) _______________  Accepting physican: Dr. Bolden  Going to: [id number]      HPI:  68y F with PMH of HFrEF (EF 20%), advanced dementia (AOX0 at baseline) bedbound with stage 4 sacral and R hip decubitus ulcer c/b R hip MRSA infection s/p PEG and chronic gavin, presumed asthma on steroids, multiple recent prolonged hospitalizations, presented to the hospital on 2/26 with fevers to 103 as well as cough and loose BMs, found to be septic likely 2/2 UTI vs sacral ulcer vs PNA, course c/b septic shock with SBP 60's, started on norepinephrine and admitted to MICU.    Infectious disease history:  "She has spent the greater past of past 9 months in the hospital.  She had difficult to control MRSA bacteremia, finally controlled after RT hip was debrided and explanted.  s/p multiple treatment for pneumonia, stage 4 decubitus ,peg, and chronic gavin.  She also has a chronic DVT and had unexplained fevers x 6 weeks during last hospital stay, with negative CT of C/A/P and negative cultures  1/4 bottles with a coag negative staph from 2/27, likely a contaminant. CDT is negative  CT chest; patchy b/l groundglass opacities, no new consolidations"    MICU course: Pressors were continued, she was found to have leukocytosis in the 30s, placed on meropenem for coverage. As per ID recs, daptomycin and micafungin were discontinued and tigecycline was added onto meropenem. A central line was placed due to increasing pressor requirement. On antibiotics, patient's fever curve and leukocytosis improved and was able to be transitioned from IV pressors to midodrine. Blood cultures were negative. CT chest was unchanged from one prior to admission, CT abdomen/pelvis with oral contrast showed no acute intra-abdominal collection or pathology that could explain her sepsis; her known left sacral decub ulcer was noted to be "without definite cortical erosion of underlying sacrum." Her Tube feeds were changed to bolus feeds from continuous feeds as her  noted that having continuous feeds requires her to stay upright throughout the day which causes severe spasms and contracture.       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 c/b R hip MRSA infection s/p PEG and chronic gavin, presumed asthma on steroids, multiple recent prolonged hospitalizations, presented to the hospital on 2/26 with fevers to 103 as well as cough and loose BMs, found to be septic likely 2/2 UTI vs sacral ulcer vs PNA, course c/b septic shock admitted to MICU for pressor support        For Follow-Up:  - Consider MRI of Abd/Pelvis to r/o osteomyelitis as source of patient's sepsis  - c/w antibiotics as per ID recs  - Wound care consult for Sacral decubitus ulcer  - f/u ID recs      Vital Signs Last 24 Hrs  T(C): 37.2 (02 Mar 2019 11:00), Max: 37.9 (01 Mar 2019 19:00)  T(F): 99 (02 Mar 2019 11:00), Max: 100.3 (01 Mar 2019 19:00)  HR: 87 (02 Mar 2019 13:00) (87 - 116)  BP: 110/63 (02 Mar 2019 13:00) (77/47 - 142/96)  BP(mean): 81 (02 Mar 2019 13:00) (57 - 104)  RR: 17 (02 Mar 2019 13:00) (17 - 39)  SpO2: 100% (02 Mar 2019 13:00) (97% - 100%)  I&O's Summary    01 Mar 2019 07:01  -  02 Mar 2019 07:00  --------------------------------------------------------  IN: 1839.1 mL / OUT: 915 mL / NET: 924.1 mL    02 Mar 2019 07:01  -  02 Mar 2019 14:48  --------------------------------------------------------  IN: 340 mL / OUT: 445 mL / NET: -105 mL          MEDICATIONS  (STANDING):  ALBUTerol    90 MICROgram(s) HFA Inhaler 1 Puff(s) Inhalation every 4 hours  apixaban 5 milliGRAM(s) Oral every 12 hours  artificial  tears Solution 1 Drop(s) Both EYES every 6 hours  clonazePAM Tablet 1.5 milliGRAM(s) Oral at bedtime  Dakins Solution - 1/4 Strength 1 Application(s) Topical two times a day  famotidine    Tablet 20 milliGRAM(s) Oral daily  ferrous    sulfate Liquid 300 milliGRAM(s) Oral daily  gabapentin   Solution 300 milliGRAM(s) Oral two times a day  insulin lispro (HumaLOG) corrective regimen sliding scale   SubCutaneous three times a day before meals  insulin lispro (HumaLOG) corrective regimen sliding scale   SubCutaneous at bedtime  Medical Marijuana Oil 1 milliLiter(s),Medical Marijuana Oil 1 milliLiter(s),Medical Marijuana Oil 1 milliLiter(s) 1 milliLiter(s) Oral <User Schedule>  meropenem  IVPB 1000 milliGRAM(s) IV Intermittent every 12 hours  midodrine 10 milliGRAM(s) Oral three times a day  predniSONE   Tablet 7.5 milliGRAM(s) Oral daily  QUEtiapine 25 milliGRAM(s) Oral at bedtime  tigecycline IVPB 50 milliGRAM(s) IV Intermittent every 12 hours  tigecycline IVPB      tiotropium 18 MICROgram(s) Capsule 1 Capsule(s) Inhalation daily      MEDICATIONS  (PRN):  acetaminophen    Suspension .. 650 milliGRAM(s) Oral every 6 hours PRN Temp greater or equal to 38C (100.4F)        LABS                                            8.9                   Neurophils% (auto):   x      (03-02 @ 00:24):    22.9 )-----------(347          Lymphocytes% (auto):  x                                             25.9                   Eosinphils% (auto):   x        Manual%: Neutrophils x    ; Lymphocytes x    ; Eosinophils x    ; Bands%: x    ; Blasts x                                    143    |  113    |  68                  Calcium: 9.0   / iCa: 1.31   (03-02 @ 00:24)    ----------------------------<  120       Magnesium: 2.1                              4.5     |  17     |  0.74             Phosphorous: 2.4      TPro  5.2    /  Alb  2.5    /  TBili  0.1    /  DBili  x      /  AST  22     /  ALT  17     /  AlkPhos  80     02 Mar 2019 00:24    ( 03-02 @ 00:24 )   PT: 18.1 sec;   INR: 1.57 ratio  aPTT: 37.4 sec

## 2019-03-02 NOTE — PROGRESS NOTE ADULT - SUBJECTIVE AND OBJECTIVE BOX
---___---___---___---___---___---___ ---___---___---___---___---___---___---___---___---___---                    <<<  M E D I C A L   A T T E N D I N G    F O L L O W    U P   N O T E  >>>    patient improving  wbc improving . bp improving      ---___---___---___---___---___---      <<<  MEDICATIONS:  >>>    MEDICATIONS  (STANDING):  ALBUTerol    90 MICROgram(s) HFA Inhaler 1 Puff(s) Inhalation every 4 hours  apixaban 5 milliGRAM(s) Oral every 12 hours  artificial  tears Solution 1 Drop(s) Both EYES every 6 hours  clonazePAM Tablet 1.5 milliGRAM(s) Oral at bedtime  Dakins Solution - 1/4 Strength 1 Application(s) Topical two times a day  dextrose 5%. 1000 milliLiter(s) (50 mL/Hr) IV Continuous <Continuous>  dextrose 50% Injectable 12.5 Gram(s) IV Push once  dextrose 50% Injectable 25 Gram(s) IV Push once  dextrose 50% Injectable 25 Gram(s) IV Push once  famotidine    Tablet 20 milliGRAM(s) Oral daily  ferrous    sulfate Liquid 300 milliGRAM(s) Oral daily  gabapentin   Solution 300 milliGRAM(s) Oral two times a day  insulin lispro (HumaLOG) corrective regimen sliding scale   SubCutaneous every 4 hours  Medical Marijuana Oil 1 milliLiter(s),Medical Marijuana Oil 1 milliLiter(s),Medical Marijuana Oil 1 milliLiter(s) 1 milliLiter(s) Oral <User Schedule>  meropenem  IVPB 1000 milliGRAM(s) IV Intermittent every 12 hours  midodrine 10 milliGRAM(s) Oral three times a day  predniSONE   Tablet 7.5 milliGRAM(s) Oral daily  QUEtiapine 25 milliGRAM(s) Oral at bedtime  tigecycline IVPB 50 milliGRAM(s) IV Intermittent every 12 hours  tigecycline IVPB      tiotropium 18 MICROgram(s) Capsule 1 Capsule(s) Inhalation daily      MEDICATIONS  (PRN):  acetaminophen    Suspension .. 650 milliGRAM(s) Oral every 6 hours PRN Temp greater or equal to 38C (100.4F)  dextrose 40% Gel 15 Gram(s) Oral once PRN Blood Glucose LESS THAN 70 milliGRAM(s)/deciliter  glucagon  Injectable 1 milliGRAM(s) IntraMuscular once PRN Glucose LESS THAN 70 milligrams/deciliter       ---___---___---___---___---___---     <<<REVIEW OF SYSTEM: >>>    unable to obtain      ---___---___---___---___---___---          <<<  VITAL SIGNS: >>>    T(F): 98.8 (19 @ 15:00), Max: 100.3 (19 @ 19:00)  HR: 93 (19 @ 15:00) (87 - 116)  BP: 114/58 (19 @ 15:00) (93/54 - 142/96)  RR: 18 (19 @ 15:00) (17 - 39)  SpO2: 100% (19 @ 15:00) (97% - 100%)  Wt(kg): --  CAPILLARY BLOOD GLUCOSE      POCT Blood Glucose.: 199 mg/dL (02 Mar 2019 10:58)    I&O's Summary    01 Mar 2019 07:  -  02 Mar 2019 07:00  --------------------------------------------------------  IN: 1839.1 mL / OUT: 915 mL / NET: 924.1 mL    02 Mar 2019 07:  -  02 Mar 2019 16:06  --------------------------------------------------------  IN: 440 mL / OUT: 670 mL / NET: -230 mL         ---___---___---___---___---___---                       PHYSICAL EXAM:    GEN: A&O X 0 , NAD , comfortable  HEENT: NCAT, PERRL, MMM, no scleral icterus, hearing intact  NECK: Supple, No JVD  CVS: S1S2 , regular , No M/R/G appreciated  PULM: CTA B/L,  no W/R/R appreciated  ABD.: soft. non tender, non distended,  bowel sounds present peg noted   Extrem: intact pulses , no edema noted  Derm: No rash or ecchymosis noted  PSYCH: normal mood, no depression, not anxious     ---___---___---___---___---___---     <<<  LAB AND IMAGING: >>>                          8.9    22.9  )-----------( 347      ( 02 Mar 2019 00:24 )             25.9               03-02    143  |  113<H>  |  68<H>  ----------------------------<  120<H>  4.5   |  17<L>  |  0.74    Ca    9.0      02 Mar 2019 00:24  Phos  2.4     03-02  Mg     2.1     03-02    TPro  5.2<L>  /  Alb  2.5<L>  /  TBili  0.1<L>  /  DBili  x   /  AST  22  /  ALT  17  /  AlkPhos  80  03-02    PT/INR - ( 02 Mar 2019 00:24 )   PT: 18.1 sec;   INR: 1.57 ratio         PTT - ( 02 Mar 2019 00:24 )  PTT:37.4 sec       Urinalysis Basic - ( 2019 18:28 )    Color: Yellow / Appearance: Slightly Turbid / S.025 / pH: x  Gluc: x / Ketone: Trace  / Bili: Negative / Urobili: 2 mg/dL   Blood: x / Protein: 100 / Nitrite: Negative   Leuk Esterase: Large / RBC: 6 /hpf / WBC 55 /HPF   Sq Epi: x / Non Sq Epi: 5 / Bacteria: Few      CARDIAC MARKERS ( 01 Mar 2019 15:16 )  x     / x     / 206 U/L / x     / x                ABG - ( 01 Mar 2019 01:40 )  pH, Arterial: 7.39  pH, Blood: x     /  pCO2: 31    /  pO2: 81    / HCO3: 18    / Base Excess: -5.2  /  SaO2: 97                      [All pertinent / recent available Imaging reports and other labs reviewed]     ---___---___---___---___---___---___ ---___---___---___---___---           <<<  A S S E S S M E N T   A N D   P L A N :  >>>      sepsis continue wound care  dementia  continue current supportive care   asthma  continue budesonide   functional quadriplegia supportive care and pt if possible  sacral decubitus ulcer  wound care  chronic lumbar fracture n medical marijuana for pain         -GI/DVT Prophylaxis.    --------------------------------------------  Case discussed with   Education given on     >>______________________<<      Deniz Bolden .         phone   [phone]

## 2019-03-02 NOTE — PROGRESS NOTE ADULT - SUBJECTIVE AND OBJECTIVE BOX
INTERVAL HPI/OVERNIGHT EVENTS:    SUBJECTIVE: Patient seen and examined at bedside.     CONSTITUTIONAL: No weakness, fevers or chills  EYES/ENT: No visual changes;  No vertigo or throat pain   NECK: No pain or stiffness  RESPIRATORY: No cough, wheezing, hemoptysis; No shortness of breath  CARDIOVASCULAR: No chest pain or palpitations  GASTROINTESTINAL: No abdominal or epigastric pain. No nausea, vomiting, or hematemesis; No diarrhea or constipation. No melena or hematochezia.  GENITOURINARY: No dysuria, frequency or hematuria  NEUROLOGICAL: No numbness or weakness  SKIN: No itching, rashes    OBJECTIVE:    VITAL SIGNS:  ICU Vital Signs Last 24 Hrs  T(C): 37.1 (02 Mar 2019 05:00), Max: 37.9 (01 Mar 2019 19:00)  T(F): 98.8 (02 Mar 2019 05:00), Max: 100.3 (01 Mar 2019 19:00)  HR: 109 (02 Mar 2019 05:00) (92 - 116)  BP: 107/65 (02 Mar 2019 05:00) (77/47 - 142/96)  BP(mean): 81 (02 Mar 2019 05:00) (57 - 104)  ABP: --  ABP(mean): --  RR: 27 (02 Mar 2019 05:00) (17 - 109)  SpO2: 100% (02 Mar 2019 05:00) (97% - 100%)         @ 07: @ 07:00  --------------------------------------------------------  IN: 3228.2 mL / OUT: 340 mL / NET: 2888.2 mL     @ 07:02 @ 06:37  --------------------------------------------------------  IN: 1839.1 mL / OUT: 875 mL / NET: 964.1 mL      CAPILLARY BLOOD GLUCOSE      POCT Blood Glucose.: 131 mg/dL (02 Mar 2019 05:42)      PHYSICAL EXAM:              MEDICATIONS:  MEDICATIONS  (STANDING):  ALBUTerol    90 MICROgram(s) HFA Inhaler 1 Puff(s) Inhalation every 4 hours  apixaban 5 milliGRAM(s) Oral every 12 hours  artificial  tears Solution 1 Drop(s) Both EYES every 6 hours  clonazePAM Tablet 1.5 milliGRAM(s) Oral at bedtime  Dakins Solution - 1/4 Strength 1 Application(s) Topical two times a day  famotidine    Tablet 20 milliGRAM(s) Oral daily  ferrous    sulfate Liquid 300 milliGRAM(s) Oral daily  gabapentin   Solution 300 milliGRAM(s) Oral two times a day  insulin lispro (HumaLOG) corrective regimen sliding scale   SubCutaneous three times a day before meals  insulin lispro (HumaLOG) corrective regimen sliding scale   SubCutaneous at bedtime  Medical Marijuana Oil 1 milliLiter(s),Medical Marijuana Oil 1 milliLiter(s),Medical Marijuana Oil 1 milliLiter(s) 1 milliLiter(s) Oral <User Schedule>  meropenem  IVPB 1000 milliGRAM(s) IV Intermittent every 12 hours  midodrine 10 milliGRAM(s) Oral three times a day  phenylephrine    Infusion 1 MICROgram(s)/kG/Min (13.425 mL/Hr) IV Continuous <Continuous>  predniSONE   Tablet 7.5 milliGRAM(s) Oral daily  QUEtiapine 25 milliGRAM(s) Oral at bedtime  tigecycline IVPB 50 milliGRAM(s) IV Intermittent every 12 hours  tigecycline IVPB      tiotropium 18 MICROgram(s) Capsule 1 Capsule(s) Inhalation daily    MEDICATIONS  (PRN):  acetaminophen    Suspension .. 650 milliGRAM(s) Oral every 6 hours PRN Temp greater or equal to 38C (100.4F)      ALLERGIES:  Allergies    ASA; dye contrast (Anaphylaxis)  aspirin (Short breath)  divalproex sodium (Other (Unknown))  Haldol (Other (Unknown))  penicillin (Short breath; Rash)  sulfa drugs (Short breath; Rash)  vancomycin (Rash; Urticaria; Hives)  Xanax (Other (Unknown))    Intolerances        LABS:                        8.9    22.9  )-----------( 347      ( 02 Mar 2019 00:24 )             25.9     03-02    143  |  113<H>  |  68<H>  ----------------------------<  120<H>  4.5   |  17<L>  |  0.74    Ca    9.0      02 Mar 2019 00:24  Phos  2.4     03-  Mg     2.1     03-    TPro  5.2<L>  /  Alb  2.5<L>  /  TBili  0.1<L>  /  DBili  x   /  AST  22  /  ALT  17  /  AlkPhos  80  03-    PT/INR - ( 02 Mar 2019 00:24 )   PT: 18.1 sec;   INR: 1.57 ratio         PTT - ( 02 Mar 2019 00:24 )  PTT:37.4 sec  Urinalysis Basic - ( 2019 18:28 )    Color: Yellow / Appearance: Slightly Turbid / S.025 / pH: x  Gluc: x / Ketone: Trace  / Bili: Negative / Urobili: 2 mg/dL   Blood: x / Protein: 100 / Nitrite: Negative   Leuk Esterase: Large / RBC: 6 /hpf / WBC 55 /HPF   Sq Epi: x / Non Sq Epi: 5 / Bacteria: Few        RADIOLOGY & ADDITIONAL TESTS: Reviewed. INTERVAL HPI/OVERNIGHT EVENTS:    SUBJECTIVE: Patient seen and examined at bedside.   Unable to obtain review of systems as patient is nonverbal.    OBJECTIVE:    VITAL SIGNS:  ICU Vital Signs Last 24 Hrs  T(C): 37.1 (02 Mar 2019 05:00), Max: 37.9 (01 Mar 2019 19:00)  T(F): 98.8 (02 Mar 2019 05:00), Max: 100.3 (01 Mar 2019 19:00)  HR: 109 (02 Mar 2019 05:00) (92 - 116)  BP: 107/65 (02 Mar 2019 05:00) (77/47 - 142/96)  BP(mean): 81 (02 Mar 2019 05:00) (57 - 104)  ABP: --  ABP(mean): --  RR: 27 (02 Mar 2019 05:00) (17 - 109)  SpO2: 100% (02 Mar 2019 05:00) (97% - 100%)         @ 07:01  -  01 @ 07:00  --------------------------------------------------------  IN: 3228.2 mL / OUT: 340 mL / NET: 2888.2 mL     @ 07:01  -  03-02 @ 06:37  --------------------------------------------------------  IN: 1839.1 mL / OUT: 875 mL / NET: 964.1 mL      CAPILLARY BLOOD GLUCOSE      POCT Blood Glucose.: 131 mg/dL (02 Mar 2019 05:42)      PHYSICAL EXAM:  PHYSICAL EXAM:  GENERAL: NAD, frail, opens eyes to name  HEAD:  Atraumatic, Normocephalic  EYES: EOMI, PERRLA, conjunctiva and sclera clear  NECK: Supple, No JVD  CHEST/LUNG: Clear to auscultation bilaterally; No wheezes  HEART: Regular rate and rhythm; No murmurs, rubs, or gallops  ABDOMEN: Soft, Nontender, Nondistended; Bowel sounds present  EXTREMITIES:  2+ Peripheral Pulses, No clubbing, cyanosis, or edema  PSYCH: AAOx0, non verbal  NEUROLOGY: Contracted        MEDICATIONS:  MEDICATIONS  (STANDING):  ALBUTerol    90 MICROgram(s) HFA Inhaler 1 Puff(s) Inhalation every 4 hours  apixaban 5 milliGRAM(s) Oral every 12 hours  artificial  tears Solution 1 Drop(s) Both EYES every 6 hours  clonazePAM Tablet 1.5 milliGRAM(s) Oral at bedtime  Dakins Solution - 1/4 Strength 1 Application(s) Topical two times a day  famotidine    Tablet 20 milliGRAM(s) Oral daily  ferrous    sulfate Liquid 300 milliGRAM(s) Oral daily  gabapentin   Solution 300 milliGRAM(s) Oral two times a day  insulin lispro (HumaLOG) corrective regimen sliding scale   SubCutaneous three times a day before meals  insulin lispro (HumaLOG) corrective regimen sliding scale   SubCutaneous at bedtime  Medical Marijuana Oil 1 milliLiter(s),Medical Marijuana Oil 1 milliLiter(s),Medical Marijuana Oil 1 milliLiter(s) 1 milliLiter(s) Oral <User Schedule>  meropenem  IVPB 1000 milliGRAM(s) IV Intermittent every 12 hours  midodrine 10 milliGRAM(s) Oral three times a day  phenylephrine    Infusion 1 MICROgram(s)/kG/Min (13.425 mL/Hr) IV Continuous <Continuous>  predniSONE   Tablet 7.5 milliGRAM(s) Oral daily  QUEtiapine 25 milliGRAM(s) Oral at bedtime  tigecycline IVPB 50 milliGRAM(s) IV Intermittent every 12 hours  tigecycline IVPB      tiotropium 18 MICROgram(s) Capsule 1 Capsule(s) Inhalation daily    MEDICATIONS  (PRN):  acetaminophen    Suspension .. 650 milliGRAM(s) Oral every 6 hours PRN Temp greater or equal to 38C (100.4F)      ALLERGIES:  Allergies    ASA; dye contrast (Anaphylaxis)  aspirin (Short breath)  divalproex sodium (Other (Unknown))  Haldol (Other (Unknown))  penicillin (Short breath; Rash)  sulfa drugs (Short breath; Rash)  vancomycin (Rash; Urticaria; Hives)  Xanax (Other (Unknown))    Intolerances        LABS:                        8.9    22.9  )-----------( 347      ( 02 Mar 2019 00:24 )             25.9     03-02    143  |  113<H>  |  68<H>  ----------------------------<  120<H>  4.5   |  17<L>  |  0.74    Ca    9.0      02 Mar 2019 00:24  Phos  2.4     03-  Mg     2.1     -    TPro  5.2<L>  /  Alb  2.5<L>  /  TBili  0.1<L>  /  DBili  x   /  AST  22  /  ALT  17  /  AlkPhos  80  03-    PT/INR - ( 02 Mar 2019 00:24 )   PT: 18.1 sec;   INR: 1.57 ratio         PTT - ( 02 Mar 2019 00:24 )  PTT:37.4 sec  Urinalysis Basic - ( 2019 18:28 )    Color: Yellow / Appearance: Slightly Turbid / S.025 / pH: x  Gluc: x / Ketone: Trace  / Bili: Negative / Urobili: 2 mg/dL   Blood: x / Protein: 100 / Nitrite: Negative   Leuk Esterase: Large / RBC: 6 /hpf / WBC 55 /HPF   Sq Epi: x / Non Sq Epi: 5 / Bacteria: Few        RADIOLOGY & ADDITIONAL TESTS: Reviewed.

## 2019-03-02 NOTE — PROGRESS NOTE ADULT - ASSESSMENT
69 yo f with multiple medical conditions admitted 2/26 with fever to 103.  She has spent the greater past of past 9 months in the hospital.  She had difficult to control MRSA bacteremia, finally controlled after RT hip was debrided and explanted.  s/p multiple treatment for pneumonia, stage 4 decubitus ,peg, and chronic gavin.  No obvious primary site for infection although  tract and sacral wound are possible sources.  She also has a chronic DVT and had unexplained fevers x 6 weeks during last hospital stay, with negative CT of C/A/P and negative cultures  1/4 bottles with a coag negative staph, likely a contaminant. CDT is negative  CT chest; patchy b/l groundglass opacities, no new consolidations    Suggest:    continue meropenem and tigecycline  follow up cultures  ct abd and pelvis ? 69 yo f with multiple medical conditions admitted 2/26 with fever to 103.  She has spent the greater past of past 9 months in the hospital.  She had difficult to control MRSA bacteremia, finally controlled after RT hip was debrided and explanted.  s/p multiple treatment for pneumonia, stage 4 decubitus ,peg, and chronic gavin.  No obvious primary site for infection although  tract and sacral wound are possible sources.  She also has a chronic DVT and had unexplained fevers x 6 weeks during last hospital stay, with negative CT of C/A/P and negative cultures  1/4 bottles with a coag negative staph, likely a contaminant. CDT is negative  CT chest; patchy b/l groundglass opacities, no new consolidations    Suggest:    continue meropenem and tigecycline  follow up cultures  ct abd and pelvis ?  d/w pt at bedside

## 2019-03-02 NOTE — PROGRESS NOTE ADULT - ATTENDING COMMENTS
1.Septic shock resolved with midodrine. No longer on IV pressors. Source of sepsis ???UTI. Continue with Meropenem  an Tigecycline..  2. CT ABD and CHEST with out new findings.  3. DVT on apixaban.  4. Baseline dementia. stable.
1. Hypotension from septic shock. Etiology unclear at  this point. Pt still requiring phenylephrine. Also started on midodrine. Increases steroids to stress steroids Hydrocortisone 50 mg IV q 6 hrs.  2, ID : Fever with increasing WBC despite broad spectrum abx and antifungal agents. For CT chest and abdomen toady. Continue antibiotics.  3.Base line  advanced Dementia. Pt more responsive today.      cc time 35 min
1.Hypotension with fever and increasing WBC. Pt did not respond to fluids.  Pt now dependent on pressors. POCUS ultrasound shows abnormal pleural  with b lines.? pneumonia . Pt oxygenating well.  2. ID h/o joint infected staph infection. Coag neg staph x 2 now. ?contaminate. Continue Daptomycin and meropenem . Continue Micafungin as per ID. CT CHEST abd/ pelvis.  3. Dementia advanced. Currently minimally  responsive.    cc time 35 min

## 2019-03-02 NOTE — PROGRESS NOTE ADULT - SUBJECTIVE AND OBJECTIVE BOX
CC: f/u for high fever    Patient reports: she has been poorly interactive , seen in ICU    REVIEW OF SYSTEMS:  All other review of systems negative (Comprehensive ROS): limited by dementia, poor cognitive status    Antimicrobials Day #  :day 4  meropenem  IVPB 1000 milliGRAM(s) IV Intermittent every 12 hours  tigecycline IVPB 50 milliGRAM(s) IV Intermittent every 12 hours  tigecycline IVPB        Other Medications Reviewed    ICU Vital Signs Last 24 Hrs  T(C): 37.2 (02 Mar 2019 11:00), Max: 37.9 (01 Mar 2019 19:00)  T(F): 99 (02 Mar 2019 11:00), Max: 100.3 (01 Mar 2019 19:00)  HR: 90 (02 Mar 2019 12:00) (90 - 116)  BP: 111/55 (02 Mar 2019 12:00) (77/47 - 142/96)  BP(mean): 79 (02 Mar 2019 12:00) (57 - 104)  ABP: --  ABP(mean): --  RR: 17 (02 Mar 2019 12:00) (17 - 45)  SpO2: 100% (02 Mar 2019 12:00) (97% - 100%)      PHYSICAL EXAM:  General: lethargic, no acute distress  Eyes:  anicteric, no conjunctival injection, no discharge  Oropharynx: no lesions or injection 	  Neck: supple, without adenopathy  Lungs: clear, diminished at bases  Heart: regular rate and rhythm; no murmur, rubs or gallops  Abdomen: soft, nondistended, nontender, without mass or organomegaly, peg in place  Skin: stage 4 sacral decubitus and rt hip decubitus  Extremities: contracted  Neurologic: lethargic, poorly interactive    LAB RESULTS:                                   8.9    22.9  )-----------( 347      ( 02 Mar 2019 00:24 )             25.9   03-02    143  |  113<H>  |  68<H>  ----------------------------<  120<H>  4.5   |  17<L>  |  0.74    Ca    9.0      02 Mar 2019 00:24  Phos  2.4     03-02  Mg     2.1     03-02    TPro  5.2<L>  /  Alb  2.5<L>  /  TBili  0.1<L>  /  DBili  x   /  AST  22  /  ALT  17  /  AlkPhos  80  -      Urinalysis Basic - ( 2019 21:59 )    Color: Yellow / Appearance: Clear / S.023 / pH: x  Gluc: x / Ketone: Negative  / Bili: Negative / Urobili: Negative   Blood: x / Protein: 30 mg/dL / Nitrite: Negative   Leuk Esterase: Large / RBC: 17 /hpf / WBC 27 /HPF   Sq Epi: x / Non Sq Epi: 0 /hpf / Bacteria: Many      MICROBIOLOGY:  RECENT CULTURES:   @ 06:07 .Blood Blood-Peripheral Blood Culture PCR    Growth in aerobic bottle: Gram Positive Cocci in Clusters  Coag negative staph in 1/ bottles         @ 05:08 .Stool Feces     Testing in progress       @ 03:27 .Stool Feces     GI PCR Results: NOT detected  *******Please Note:*******  ,         @ 03:25 .Stool Feces     No enteric pathogens to date: Final culture pending    CDT negative      RADIOLOGY REVIEWED:  < from: Xray Chest 1 View- PORTABLE-Urgent (19 @ 23:13) >  IMPRESSION:   Clear lungs.    < end of copied text >

## 2019-03-02 NOTE — PROGRESS NOTE ADULT - ASSESSMENT
68y F with PMH of HFrEF (EF 20%), advanced dementia (AOX0 at baseline) bedbound with stage 4 sacral and R hip decubitus ulcer c/b R hip MRSA infection s/p PEG and chronic gavin, presumed asthma on steroids, multiple recent prolonged hospitalizations, presented to the hospital on 2/26 with fevers to 103 as well as cough and loose BMs, found to be septic likely 2/2 UTI vs sacral ulcer vs PNA, course c/b septic shock with SBP 60's now on norepinephrine.    #Neuro  Advanced dementia & h/o CVA, AAOx0 at baseline, currently alert and following commands, though still more lethargic than usual as per   - tx for septic shock as below  - c/w gabapentin 300 mg bid  - c/w quetiapine 25 mg qD  - c/w clonazepam 1.5 mg qhs  - c/w medical marijuana 1 mL tid  - monitor mental status    #Resp  Presumed asthma on home steroids. Possible PNA. Satting well on room air. 2/26 CXR w clear lungs, however POCUS showing diffuse B lines with pleural thickening, concern for infectious etiology in left lung  - c/w antibiotics as below, will follow up with ID in regards to optimal antibiotic regimen  - c/w prednisone 7.5 mg qD  - c/w albuterol q4h  - c/w tiotropium 18 mcg qD  - appreciate ID recs    #ID  Septic shock. History of difficult to control MRSA bacteremia which resolved after RT hip was debrided and explanted. Also history of pneumonia on more than 1 occasion s/p treatment. Unexplained fevers x 6 weeks during last hospital stay, with negative CT of C/A/P and negative cultures, rheumatology evaluation was also without an explanation for fever. 1/4 bottles with a coag negative staph, likely a contaminant. CDT is negative.  Now with septic shock 2/2 UTI vs decub ulcer vs PNA. Leukocytosis to 36 with left shift, stable at 33. UA positive, UCx 2/27 w >100K GNRs & GPCs. BCx 2/27 w coag neg staph. RVP negative. Cdiff & stool O&P negative. CXR 2/26 negative for infection, however POCUS suggestive of PNA as described above. Exchanged gavin and started on meropenem & micafungin for abx coverage in addition to daptomycin.  - CT C/A/P to eval for source of infection  - c/w meropenem 1000 mg IV q12h  - c/w daptomycin 210 mg IV q48h  - c/w micafungin 100 mg IV qD  - replace gavin catheter (last changed on 2/26)  - send repeat urine culture  - f/u BCx, UCx, sputum Cx  - c/w tylenol 650 mg PO prn fever  - appreciate ID recs    #CV  HFrEF w EF 26%, pulmonary HTN, proBNP 5829.  Hypotension w SBP 60s likely 2/2 septic shock, lactate 1.2 given 1L NS bolus at RRT on 2/28 with pressures improved to 90/63, started on levophed, then switched to phenylephrine i/s/o tachycardia.  - c/w antibiotics as above  - c/w phenylephrine gtt, titrate as needed  - monitor BP, cautious IVF i/s/o HF. Given 500cc overnight, giving another 250cc today.  - monitor I&Os & daily wts    #GI  Ulcerative colitis, GERD, s/p PEG tube  - c/w famotidine 20 mg PO qD  - c/w tube feeds  - monitor BMs    #Heme  Chronic DVT noted on prior admission  - c/w apixaban 5 mg q12h  Iron deficiency anemia w stable Hg ~9 and no e/o active bleed  - c/w ferrous sulfate 300 mg qD  - monitor CBC, maintain active T&S, transfuse for Hg < 7    #Endo  No h/o diabetes. HgA1C 5.3%.  - c/w ISS    #Psych  Anxiety, depression  - c/w quetiapine, clonazepam, medical marijuana as above    #PPx  DVT ppx: c/w apixaban 68y F with PMH of HFrEF (EF 20%), advanced dementia (AOX0 at baseline) bedbound with stage 4 sacral and R hip decubitus ulcer c/b R hip MRSA infection s/p PEG and chronic gavin, presumed asthma on steroids, multiple recent prolonged hospitalizations, presented to the hospital on 2/26 with fevers to 103 as well as cough and loose BMs, found to be septic likely 2/2 UTI vs sacral ulcer vs PNA, course c/b septic shock with SBP 60's on pressors     #Neuro  Advanced dementia & h/o CVA, AAOx0 at baseline, currently alert and following commands, though still more lethargic than usual as per   - tx for septic shock as below  - c/w gabapentin 300 mg bid  - c/w quetiapine 25 mg qD  - c/w clonazepam 1.5 mg qhs  - c/w medical marijuana 1 mL tid  - monitor mental status    #Resp  Presumed asthma on home steroids. Possible PNA. Satting well on room air. 2/26 CXR w clear lungs, however POCUS showing diffuse B lines with pleural thickening, concern for infectious etiology in left lung  - c/w antibiotics as below, will follow up with ID in regards to optimal antibiotic regimen  - c/w prednisone 7.5 mg qD  - c/w albuterol q4h  - c/w tiotropium 18 mcg qD  - appreciate ID recs    #ID  Septic shock. History of difficult to control MRSA bacteremia which resolved after RT hip was debrided and explanted. Also history of pneumonia on more than 1 occasion s/p treatment. Unexplained fevers x 6 weeks during last hospital stay, with negative CT of C/A/P and negative cultures, rheumatology evaluation was also without an explanation for fever. 1/4 bottles with a coag negative staph, likely a contaminant. CDT is negative.  Now with septic shock 2/2 UTI vs decub ulcer vs PNA. Leukocytosis to 36 with left shift, stable at 33. UA positive, UCx 2/27 w >100K GNRs & GPCs. BCx 2/27 w coag neg staph. RVP negative. Cdiff & stool O&P negative. CXR 2/26 negative for infection, however POCUS suggestive of PNA as described above. Exchanged gavin and started on meropenem & micafungin for abx coverage in addition to daptomycin.  - CT C/A/P to eval for source of infection  - c/w meropenem 1000 mg IV q12h  - c/w daptomycin 210 mg IV q48h  - c/w micafungin 100 mg IV qD  - replace gavin catheter (last changed on 2/26)  - send repeat urine culture  - f/u BCx, UCx, sputum Cx  - c/w tylenol 650 mg PO prn fever  - appreciate ID recs    #CV  HFrEF w EF 26%, pulmonary HTN, proBNP 5829.  Hypotension w SBP 60s likely 2/2 septic shock, lactate 1.2 given 1L NS bolus at RRT on 2/28 with pressures improved to 90/63, started on levophed, then switched to phenylephrine i/s/o tachycardia.  - c/w antibiotics as above  - off phenylephrine gtt, on midodrine maintaining MAP  - monitor BP, cautious IVF i/s/o HF.   - monitor I&Os & daily wts    #GI  Ulcerative colitis, GERD, s/p PEG tube  - c/w famotidine 20 mg PO qD  - c/w tube feeds q4 bolus feeds  - monitor BMs    #Heme  Chronic DVT noted on prior admission  - c/w apixaban 5 mg q12h  Iron deficiency anemia w stable Hg ~9 and no e/o active bleed  - c/w ferrous sulfate 300 mg qD  - monitor CBC, maintain active T&S, transfuse for Hg < 7    #Endo  No h/o diabetes. HgA1C 5.3%.  - c/w ISS    #Psych  Anxiety, depression  - c/w quetiapine, clonazepam, medical marijuana as above    #PPx  DVT ppx: c/w apixaban 68y F with PMH of HFrEF (EF 20%), advanced dementia (AOX0 at baseline) bedbound with stage 4 sacral and R hip decubitus ulcer c/b R hip MRSA infection s/p PEG and chronic gavin, presumed asthma on steroids, multiple recent prolonged hospitalizations, presented to the hospital on 2/26 with fevers to 103 as well as cough and loose BMs, found to be septic likely 2/2 UTI vs sacral ulcer vs PNA, course c/b septic shock with SBP 60's on pressors     #Neuro  Advanced dementia & h/o CVA, AAOx0 at baseline, currently alert and following commands, though still more lethargic than usual as per   - tx for septic shock as below  - c/w gabapentin 300 mg bid  - c/w quetiapine 25 mg qD  - c/w clonazepam 1.5 mg qhs  - c/w medical marijuana 1 mL tid  - monitor mental status    #Resp. Possible PNA. Satting well on room air. 2/26 CXR w clear lungs, however POCUS showing diffuse B lines with pleural thickening, concern for infectious etiology in left lung  - c/w antibiotics as below, will follow up with ID in regards to optimal antibiotic regimen  - c/w prednisone 7.5 mg qD  - c/w albuterol q4h  - c/w tiotropium 18 mcg qD  - appreciate ID recs    #ID  Septic shock. History of difficult to control MRSA bacteremia which resolved after RT hip was debrided and explanted. Also history of pneumonia on more than 1 occasion s/p treatment. Unexplained fevers x 6 weeks during last hospital stay, with negative CT of C/A/P and negative cultures, rheumatology evaluation was also without an explanation for fever. 1/4 bottles with a coag negative staph, likely a contaminant. CDT is negative.  Now with septic shock 2/2 UTI vs decub ulcer vs PNA. Leukocytosis to 36 with left shift, stable at 33. UA positive, UCx 2/27 w >100K GNRs & GPCs. BCx 2/27 w coag neg staph. RVP negative. Cdiff & stool O&P negative. CXR 2/26 negative for infection, however POCUS suggestive of PNA as described above. Exchanged gavin and started on meropenem & micafungin for abx coverage in addition to daptomycin.  - CT C/A/P to eval for source of infection  - c/w meropenem 1000 mg IV q12h  - c/w micafungin 100 mg IV qD  - replace gavin catheter (last changed on 2/26)  - send repeat urine culture  - f/u BCx, UCx, sputum Cx  - c/w tylenol 650 mg PO prn fever  - appreciate ID recs    #CV  HFrEF w EF 26%, pulmonary HTN, proBNP 5829.  Hypotension w SBP 60s likely 2/2 septic shock, lactate 1.2 given 1L NS bolus at RRT on 2/28 with pressures improved to 90/63, started on levophed, then switched to phenylephrine i/s/o tachycardia.  - c/w antibiotics as above  - off phenylephrine gtt, on midodrine maintaining MAP  - monitor BP, cautious IVF i/s/o HF.   - monitor I&Os & daily wts    #GI  Ulcerative colitis, GERD, s/p PEG tube  - c/w famotidine 20 mg PO qD  - c/w tube feeds q4 bolus feeds  - monitor BMs    #Heme  Chronic DVT noted on prior admission  - c/w apixaban 5 mg q12h  Iron deficiency anemia w stable Hg ~9 and no e/o active bleed  - c/w ferrous sulfate 300 mg qD  - monitor CBC, maintain active T&S, transfuse for Hg < 7    #Endo  No h/o diabetes. HgA1C 5.3%.  - c/w ISS    #Psych  Anxiety, depression  - c/w quetiapine, clonazepam, medical marijuana as above    #PPx  DVT ppx: c/w apixaban

## 2019-03-03 LAB
ANION GAP SERPL CALC-SCNC: 17 MMOL/L — SIGNIFICANT CHANGE UP (ref 5–17)
BUN SERPL-MCNC: 78 MG/DL — HIGH (ref 7–23)
CALCIUM SERPL-MCNC: 9.6 MG/DL — SIGNIFICANT CHANGE UP (ref 8.4–10.5)
CHLORIDE SERPL-SCNC: 108 MMOL/L — SIGNIFICANT CHANGE UP (ref 96–108)
CO2 SERPL-SCNC: 16 MMOL/L — LOW (ref 22–31)
CREAT SERPL-MCNC: 0.63 MG/DL — SIGNIFICANT CHANGE UP (ref 0.5–1.3)
GAS PNL BLDA: SIGNIFICANT CHANGE UP
GLUCOSE BLDC GLUCOMTR-MCNC: 104 MG/DL — HIGH (ref 70–99)
GLUCOSE BLDC GLUCOMTR-MCNC: 107 MG/DL — HIGH (ref 70–99)
GLUCOSE BLDC GLUCOMTR-MCNC: 108 MG/DL — HIGH (ref 70–99)
GLUCOSE BLDC GLUCOMTR-MCNC: 139 MG/DL — HIGH (ref 70–99)
GLUCOSE BLDC GLUCOMTR-MCNC: 152 MG/DL — HIGH (ref 70–99)
GLUCOSE BLDC GLUCOMTR-MCNC: 204 MG/DL — HIGH (ref 70–99)
GLUCOSE SERPL-MCNC: 134 MG/DL — HIGH (ref 70–99)
HCT VFR BLD CALC: 30.5 % — LOW (ref 34.5–45)
HGB BLD-MCNC: 9.2 G/DL — LOW (ref 11.5–15.5)
MAGNESIUM SERPL-MCNC: 2.1 MG/DL — SIGNIFICANT CHANGE UP (ref 1.6–2.6)
MCHC RBC-ENTMCNC: 28.4 PG — SIGNIFICANT CHANGE UP (ref 27–34)
MCHC RBC-ENTMCNC: 30.2 GM/DL — LOW (ref 32–36)
MCV RBC AUTO: 94.1 FL — SIGNIFICANT CHANGE UP (ref 80–100)
PHOSPHATE SERPL-MCNC: 3.4 MG/DL — SIGNIFICANT CHANGE UP (ref 2.5–4.5)
PLATELET # BLD AUTO: 551 K/UL — HIGH (ref 150–400)
POTASSIUM SERPL-MCNC: 4.9 MMOL/L — SIGNIFICANT CHANGE UP (ref 3.5–5.3)
POTASSIUM SERPL-SCNC: 4.9 MMOL/L — SIGNIFICANT CHANGE UP (ref 3.5–5.3)
RBC # BLD: 3.24 M/UL — LOW (ref 3.8–5.2)
RBC # FLD: 13.5 % — SIGNIFICANT CHANGE UP (ref 10.3–14.5)
SODIUM SERPL-SCNC: 141 MMOL/L — SIGNIFICANT CHANGE UP (ref 135–145)
WBC # BLD: 23.84 K/UL — HIGH (ref 3.8–10.5)
WBC # FLD AUTO: 23.84 K/UL — HIGH (ref 3.8–10.5)

## 2019-03-03 PROCEDURE — 71045 X-RAY EXAM CHEST 1 VIEW: CPT | Mod: 26

## 2019-03-03 PROCEDURE — 93010 ELECTROCARDIOGRAM REPORT: CPT

## 2019-03-03 RX ORDER — LINEZOLID 600 MG/300ML
600 INJECTION, SOLUTION INTRAVENOUS ONCE
Qty: 0 | Refills: 0 | Status: COMPLETED | OUTPATIENT
Start: 2019-03-03 | End: 2019-03-03

## 2019-03-03 RX ORDER — ACETAMINOPHEN 500 MG
1000 TABLET ORAL ONCE
Qty: 0 | Refills: 0 | Status: COMPLETED | OUTPATIENT
Start: 2019-03-03 | End: 2019-03-03

## 2019-03-03 RX ORDER — MICAFUNGIN SODIUM 100 MG/1
100 INJECTION, POWDER, LYOPHILIZED, FOR SOLUTION INTRAVENOUS ONCE
Qty: 0 | Refills: 0 | Status: COMPLETED | OUTPATIENT
Start: 2019-03-03 | End: 2019-03-03

## 2019-03-03 RX ORDER — DAPTOMYCIN 500 MG/10ML
240 INJECTION, POWDER, LYOPHILIZED, FOR SOLUTION INTRAVENOUS EVERY 24 HOURS
Qty: 0 | Refills: 0 | Status: DISCONTINUED | OUTPATIENT
Start: 2019-03-03 | End: 2019-03-08

## 2019-03-03 RX ORDER — MICAFUNGIN SODIUM 100 MG/1
100 INJECTION, POWDER, LYOPHILIZED, FOR SOLUTION INTRAVENOUS EVERY 24 HOURS
Qty: 0 | Refills: 0 | Status: DISCONTINUED | OUTPATIENT
Start: 2019-03-04 | End: 2019-03-08

## 2019-03-03 RX ORDER — AMIKACIN SULFATE 250 MG/ML
200 INJECTION, SOLUTION INTRAMUSCULAR; INTRAVENOUS ONCE
Qty: 0 | Refills: 0 | Status: COMPLETED | OUTPATIENT
Start: 2019-03-03 | End: 2019-03-03

## 2019-03-03 RX ORDER — MICAFUNGIN SODIUM 100 MG/1
INJECTION, POWDER, LYOPHILIZED, FOR SOLUTION INTRAVENOUS
Qty: 0 | Refills: 0 | Status: DISCONTINUED | OUTPATIENT
Start: 2019-03-03 | End: 2019-03-08

## 2019-03-03 RX ADMIN — TIGECYCLINE 105 MILLIGRAM(S): 50 INJECTION, POWDER, LYOPHILIZED, FOR SOLUTION INTRAVENOUS at 06:26

## 2019-03-03 RX ADMIN — QUETIAPINE FUMARATE 25 MILLIGRAM(S): 200 TABLET, FILM COATED ORAL at 22:24

## 2019-03-03 RX ADMIN — Medication 1 DROP(S): at 06:29

## 2019-03-03 RX ADMIN — Medication 1 DROP(S): at 19:07

## 2019-03-03 RX ADMIN — Medication 2: at 14:49

## 2019-03-03 RX ADMIN — MICAFUNGIN SODIUM 105 MILLIGRAM(S): 100 INJECTION, POWDER, LYOPHILIZED, FOR SOLUTION INTRAVENOUS at 13:59

## 2019-03-03 RX ADMIN — Medication 300 MILLIGRAM(S): at 13:19

## 2019-03-03 RX ADMIN — Medication 650 MILLIGRAM(S): at 08:16

## 2019-03-03 RX ADMIN — AMIKACIN SULFATE 100.8 MILLIGRAM(S): 250 INJECTION, SOLUTION INTRAMUSCULAR; INTRAVENOUS at 12:16

## 2019-03-03 RX ADMIN — APIXABAN 5 MILLIGRAM(S): 2.5 TABLET, FILM COATED ORAL at 19:07

## 2019-03-03 RX ADMIN — GABAPENTIN 300 MILLIGRAM(S): 400 CAPSULE ORAL at 19:08

## 2019-03-03 RX ADMIN — Medication 1 DROP(S): at 01:01

## 2019-03-03 RX ADMIN — MIDODRINE HYDROCHLORIDE 10 MILLIGRAM(S): 2.5 TABLET ORAL at 22:24

## 2019-03-03 RX ADMIN — MEROPENEM 100 MILLIGRAM(S): 1 INJECTION INTRAVENOUS at 07:41

## 2019-03-03 RX ADMIN — MIDODRINE HYDROCHLORIDE 10 MILLIGRAM(S): 2.5 TABLET ORAL at 07:07

## 2019-03-03 RX ADMIN — DAPTOMYCIN 109.6 MILLIGRAM(S): 500 INJECTION, POWDER, LYOPHILIZED, FOR SOLUTION INTRAVENOUS at 17:00

## 2019-03-03 RX ADMIN — MEROPENEM 100 MILLIGRAM(S): 1 INJECTION INTRAVENOUS at 19:08

## 2019-03-03 RX ADMIN — FAMOTIDINE 20 MILLIGRAM(S): 10 INJECTION INTRAVENOUS at 13:18

## 2019-03-03 RX ADMIN — APIXABAN 5 MILLIGRAM(S): 2.5 TABLET, FILM COATED ORAL at 07:07

## 2019-03-03 RX ADMIN — GABAPENTIN 300 MILLIGRAM(S): 400 CAPSULE ORAL at 07:07

## 2019-03-03 RX ADMIN — Medication 1 APPLICATION(S): at 18:06

## 2019-03-03 RX ADMIN — Medication 1 APPLICATION(S): at 06:29

## 2019-03-03 RX ADMIN — Medication 400 MILLIGRAM(S): at 12:00

## 2019-03-03 RX ADMIN — Medication 1.5 MILLIGRAM(S): at 22:24

## 2019-03-03 RX ADMIN — Medication 650 MILLIGRAM(S): at 07:06

## 2019-03-03 RX ADMIN — Medication 1000 MILLIGRAM(S): at 14:08

## 2019-03-03 RX ADMIN — Medication 1 DROP(S): at 13:18

## 2019-03-03 RX ADMIN — MIDODRINE HYDROCHLORIDE 10 MILLIGRAM(S): 2.5 TABLET ORAL at 13:19

## 2019-03-03 RX ADMIN — Medication 7.5 MILLIGRAM(S): at 07:07

## 2019-03-03 RX ADMIN — LINEZOLID 600 MILLIGRAM(S): 600 INJECTION, SOLUTION INTRAVENOUS at 08:53

## 2019-03-03 NOTE — PROGRESS NOTE ADULT - SUBJECTIVE AND OBJECTIVE BOX
---___---___---___---___---___---___ ---___---___---___---___---___---___---___---___---___---                    <<<  M E D I C A L   A T T E N D I N G    F O L L O W    U P   N O T E  >>>    now tachycardic in bed was stable yesterday and fevers of 102     ---___---___---___---___---___---      <<<  MEDICATIONS:  >>>    MEDICATIONS  (STANDING):  ALBUTerol    90 MICROgram(s) HFA Inhaler 1 Puff(s) Inhalation every 4 hours  apixaban 5 milliGRAM(s) Oral every 12 hours  artificial  tears Solution 1 Drop(s) Both EYES every 6 hours  clonazePAM Tablet 1.5 milliGRAM(s) Oral at bedtime  Dakins Solution - 1/4 Strength 1 Application(s) Topical two times a day  dextrose 5%. 1000 milliLiter(s) (50 mL/Hr) IV Continuous <Continuous>  dextrose 50% Injectable 12.5 Gram(s) IV Push once  dextrose 50% Injectable 25 Gram(s) IV Push once  dextrose 50% Injectable 25 Gram(s) IV Push once  famotidine    Tablet 20 milliGRAM(s) Oral daily  ferrous    sulfate Liquid 300 milliGRAM(s) Oral daily  gabapentin   Solution 300 milliGRAM(s) Oral two times a day  insulin lispro (HumaLOG) corrective regimen sliding scale   SubCutaneous every 4 hours  linezolid    Tablet 600 milliGRAM(s) Oral once  Medical Marijuana Oil 1 milliLiter(s),Medical Marijuana Oil 1 milliLiter(s),Medical Marijuana Oil 1 milliLiter(s) 1 milliLiter(s) Oral <User Schedule>  meropenem  IVPB 1000 milliGRAM(s) IV Intermittent every 12 hours  midodrine 10 milliGRAM(s) Oral three times a day  predniSONE   Tablet 7.5 milliGRAM(s) Oral daily  QUEtiapine 25 milliGRAM(s) Oral at bedtime  tigecycline IVPB 50 milliGRAM(s) IV Intermittent every 12 hours  tigecycline IVPB      tiotropium 18 MICROgram(s) Capsule 1 Capsule(s) Inhalation daily      MEDICATIONS  (PRN):  acetaminophen    Suspension .. 650 milliGRAM(s) Oral every 6 hours PRN Temp greater or equal to 38C (100.4F)  dextrose 40% Gel 15 Gram(s) Oral once PRN Blood Glucose LESS THAN 70 milliGRAM(s)/deciliter  glucagon  Injectable 1 milliGRAM(s) IntraMuscular once PRN Glucose LESS THAN 70 milligrams/deciliter       ---___---___---___---___---___---     <<<REVIEW OF SYSTEM: >>>    unable to obtain    ---___---___---___---___---___---          <<<  VITAL SIGNS: >>>    T(F): 102.2 (03-03-19 @ 06:13), Max: 102.2 (03-03-19 @ 06:13)  HR: 123 (03-03-19 @ 06:13) (86 - 123)  BP: 132/73 (03-03-19 @ 06:13) (100/59 - 134/63)  RR: 20 (03-03-19 @ 06:13) (17 - 34)  SpO2: 96% (03-03-19 @ 06:13) (96% - 100%)  Wt(kg): --  CAPILLARY BLOOD GLUCOSE      POCT Blood Glucose.: 139 mg/dL (03 Mar 2019 07:30)    I&O's Summary    02 Mar 2019 07:01  -  03 Mar 2019 07:00  --------------------------------------------------------  IN: 1510 mL / OUT: 1745 mL / NET: -235 mL         ---___---___---___---___---___---                       PHYSICAL EXAM:    GEN: A&O X 0 , NAD , comfortable  HEENT: NCAT, PERRL, MMM, no scleral icterus, hearing intact  NECK: Supple, No JVD  CVS: S1S2 , regular , No M/R/G appreciated  PULM: CTA B/L,  no W/R/R appreciated pen noted   ABD.: soft. non tender, non distended,  bowel sounds present  Extrem: intact pulses , no edema noted  Derm: sacral decubitus         ---___---___---___---___---___---     <<<  LAB AND IMAGING: >>>                          8.9    22.9  )-----------( 347      ( 02 Mar 2019 00:24 )             25.9               03-03    141  |  108  |  78<H>  ----------------------------<  134<H>  4.9   |  16<L>  |  0.63    Ca    9.6      03 Mar 2019 06:40  Phos  3.4     03-03  Mg     2.1     03-03    TPro  5.2<L>  /  Alb  2.5<L>  /  TBili  0.1<L>  /  DBili  x   /  AST  22  /  ALT  17  /  AlkPhos  80  03-02    PT/INR - ( 02 Mar 2019 00:24 )   PT: 18.1 sec;   INR: 1.57 ratio         PTT - ( 02 Mar 2019 00:24 )  PTT:37.4 sec         CARDIAC MARKERS ( 01 Mar 2019 15:16 )  x     / x     / 206 U/L / x     / x                        [All pertinent / recent available Imaging reports and other labs reviewed]     ---___---___---___---___---___---___ ---___---___---___---___---           <<<  A S S E S S M E N T   A N D   P L A N :  >>>      fever  repeat blood cultures    sepsis  continue iv antibiotics  id to follow   asthma continue current meds   chronic pain  on medical marijuana for chronic lumbar fracture   dementia  supportive care   sacral decubitus continue wound   care  source of infection not found       -GI/DVT Prophylaxis.    --------------------------------------------  Case discussed with   Education given on     >>______________________<<      Deniz Bolden .         phone   [phone]

## 2019-03-03 NOTE — PROGRESS NOTE ADULT - ASSESSMENT
67 yo f with multiple medical conditions admitted 2/26 with fever to 103.  She has spent the greater past of past 9 months in the hospital.  She had difficult to control MRSA bacteremia, finally controlled after RT hip was debrided and explanted.  s/p multiple treatment for pneumonia, stage 4 decubitus ,peg, and chronic gavin.  No obvious primary site for infection   She also has a chronic DVT and had unexplained fevers x 6 weeks during last hospital stay  1/4 bottles with a coag negative staph, likely a contaminant. CDT is negative  CT: patchy b/l groundglass opacities, no new consolidations, no acute abd pathology    Suggest:    continue meropenem  d/c tygacil  repeat cultures  start Daptomycin, Amikacin and Eraxis  d/w pt  at bedside and medicine NP  prognosis seems extremely poor  >35 min spent reviewing pt labs, radiology reports, micro data and d/w family and medical team  d/w Dr Leiva, nuclear scan is an option as part of FUO w/u, but doubt will be diagnostic 67 yo f with multiple medical conditions admitted 2/26 with fever to 103.  She has spent the greater past of past 9 months in the hospital.  She had difficult to control MRSA bacteremia, finally controlled after RT hip was debrided and explanted.  s/p multiple treatment for pneumonia, stage 4 decubitus ,peg, and chronic gavin.  No obvious primary site for infection   She also has a chronic DVT and had unexplained fevers x 6 weeks during last hospital stay  1/4 bottles with a coag negative staph, likely a contaminant. CDT is negative  CT: patchy b/l groundglass opacities, no new consolidations, no acute abd pathology    Suggest:    continue meropenem  d/c tygacil  repeat cultures  start Daptomycin, Amikacin and Eraxis  s/p a dose of Zyvox this am  d/w pt  at bedside and medicine NP  prognosis seems extremely poor  >35 min spent reviewing pt labs, radiology reports, micro data and d/w family and medical team  d/w Dr Leiva, nuclear scan is an option as part of FUO w/u, but doubt will be diagnostic

## 2019-03-03 NOTE — CHART NOTE - NSCHARTNOTEFT_GEN_A_CORE
MEDICINE NP    Notified by RN patient with temperature . Seen and examined patient at bedside. Patient is alert, NAD. Patient non verbal. patient with temp of 102 and heart rate 120's ekg performed shows sinus tacchy. Patient was MICU transfer called MICU as per husbands request he wants his wife transferred back to ICU for better monitoring. MICU resident spoke with patient  and ordered ABG, cxr, blood cultures, linezolid x1 dose and ID will follow  patient. Patient also will be transferred to tele unit pending. patient vital signs stable and o2 sat 96%.     VITAL SIGNS:  T(C): 39 (03-03-19 @ 06:13), Max: 39 (03-03-19 @ 06:13)  HR: 123 (03-03-19 @ 06:13) (86 - 123)  BP: 132/73 (03-03-19 @ 06:13) (100/59 - 134/63)  RR: 20 (03-03-19 @ 06:13) (17 - 34)  SpO2: 96% (03-03-19 @ 06:13) (96% - 100%)  Wt(kg): --

## 2019-03-03 NOTE — PROGRESS NOTE ADULT - SUBJECTIVE AND OBJECTIVE BOX
CC: f/u for high fever    Patient now with high fever and tachycardia despite broad antimicrobial coverage  she was moved from ICU and now upgraded to tele    REVIEW OF SYSTEMS: unable to obtain  All other review of systems negative (Comprehensive ROS): limited by dementia, poor cognitive status    Antimicrobials Day #  :day 5  meropenem  IVPB 1000 milliGRAM(s) IV Intermittent every 12 hours  tigecycline IVPB 50 milliGRAM(s) IV Intermittent every 12 hours  tigecycline IVPB           Other Medications Reviewed    Vital Signs Last 24 Hrs  T(C): 38.3 (03 Mar 2019 10:00), Max: 39 (03 Mar 2019 06:13)  T(F): 101 (03 Mar 2019 10:00), Max: 102.2 (03 Mar 2019 06:13)  HR: 110 (03 Mar 2019 10:29) (86 - 123)  BP: 115/75 (03 Mar 2019 10:29) (100/59 - 150/80)  BP(mean): 85 (02 Mar 2019 16:00) (79 - 85)  RR: 30 (03 Mar 2019 11:00) (17 - 30)  SpO2: 96% (03 Mar 2019 10:29) (94% - 100%)      PHYSICAL EXAM:  General: lethargic, tachy  Eyes:  anicteric, no conjunctival injection, no discharge  Oropharynx: no lesions or injection 	  Neck: supple, without adenopathy  Lungs: poor insp effort  Heart: tachy  Abdomen: soft, nondistended, nontender, without mass or organomegaly, peg in place  Skin: stage 4 sacral decubitus and rt hip decubitus  Extremities: contracted  Neurologic: lethargic, poorly interactive    LAB RESULTS:                                              9.2    23.84 )-----------( 551      ( 03 Mar 2019 09:46 )             30.5   03-    141  |  108  |  78<H>  ----------------------------<  134<H>  4.9   |  16<L>  |  0.63    Ca    9.6      03 Mar 2019 06:40  Phos  3.4     03-03  Mg     2.1     -03    TPro  5.2<L>  /  Alb  2.5<L>  /  TBili  0.1<L>  /  DBili  x   /  AST  22  /  ALT  17  /  AlkPhos  80  03-02        Urinalysis Basic - ( 2019 21:59 )    Color: Yellow / Appearance: Clear / S.023 / pH: x  Gluc: x / Ketone: Negative  / Bili: Negative / Urobili: Negative   Blood: x / Protein: 30 mg/dL / Nitrite: Negative   Leuk Esterase: Large / RBC: 17 /hpf / WBC 27 /HPF   Sq Epi: x / Non Sq Epi: 0 /hpf / Bacteria: Many      MICROBIOLOGY:  RECENT CULTURES:    ,        RADIOLOGY REVIEWED:    < from: CT Abdomen and Pelvis w/ Oral Cont (19 @ 08:46) >  Limited exam.    Left-sided sacral decubitus ulcer without definite cortical erosion of   the underlying sacrum. MRI would be more sensitive to evaluate for   osteomyelitis.    No acute intra-abdominal pathology or collection.    < end of copied text >    < from: CT Chest No Cont (19 @ 16:32) >  Diffuse and airway centered groundglass opacities   noted throughout both lungs. Trace left pleural effusion noted.    < end of copied text >

## 2019-03-04 LAB
ALBUMIN SERPL ELPH-MCNC: 2.5 G/DL — LOW (ref 3.3–5)
ALP SERPL-CCNC: 119 U/L — SIGNIFICANT CHANGE UP (ref 40–120)
ALT FLD-CCNC: 23 U/L — SIGNIFICANT CHANGE UP (ref 10–45)
ANION GAP SERPL CALC-SCNC: 12 MMOL/L — SIGNIFICANT CHANGE UP (ref 5–17)
AST SERPL-CCNC: 20 U/L — SIGNIFICANT CHANGE UP (ref 10–40)
BASOPHILS # BLD AUTO: 0.04 K/UL — SIGNIFICANT CHANGE UP (ref 0–0.2)
BASOPHILS NFR BLD AUTO: 0.2 % — SIGNIFICANT CHANGE UP (ref 0–2)
BILIRUB SERPL-MCNC: 0.2 MG/DL — SIGNIFICANT CHANGE UP (ref 0.2–1.2)
BUN SERPL-MCNC: 70 MG/DL — HIGH (ref 7–23)
CALCIUM SERPL-MCNC: 9.1 MG/DL — SIGNIFICANT CHANGE UP (ref 8.4–10.5)
CHLORIDE SERPL-SCNC: 111 MMOL/L — HIGH (ref 96–108)
CO2 SERPL-SCNC: 21 MMOL/L — LOW (ref 22–31)
CREAT SERPL-MCNC: 0.58 MG/DL — SIGNIFICANT CHANGE UP (ref 0.5–1.3)
CULTURE RESULTS: SIGNIFICANT CHANGE UP
EOSINOPHIL # BLD AUTO: 1.18 K/UL — HIGH (ref 0–0.5)
EOSINOPHIL NFR BLD AUTO: 7 % — HIGH (ref 0–6)
GLUCOSE BLDC GLUCOMTR-MCNC: 118 MG/DL — HIGH (ref 70–99)
GLUCOSE BLDC GLUCOMTR-MCNC: 133 MG/DL — HIGH (ref 70–99)
GLUCOSE BLDC GLUCOMTR-MCNC: 188 MG/DL — HIGH (ref 70–99)
GLUCOSE SERPL-MCNC: 101 MG/DL — HIGH (ref 70–99)
HCT VFR BLD CALC: 27.5 % — LOW (ref 34.5–45)
HGB BLD-MCNC: 8.7 G/DL — LOW (ref 11.5–15.5)
IMM GRANULOCYTES NFR BLD AUTO: 0.6 % — SIGNIFICANT CHANGE UP (ref 0–1.5)
LYMPHOCYTES # BLD AUTO: 13 % — SIGNIFICANT CHANGE UP (ref 13–44)
LYMPHOCYTES # BLD AUTO: 2.19 K/UL — SIGNIFICANT CHANGE UP (ref 1–3.3)
MCHC RBC-ENTMCNC: 29.5 PG — SIGNIFICANT CHANGE UP (ref 27–34)
MCHC RBC-ENTMCNC: 31.6 GM/DL — LOW (ref 32–36)
MCV RBC AUTO: 93.2 FL — SIGNIFICANT CHANGE UP (ref 80–100)
MONOCYTES # BLD AUTO: 1.38 K/UL — HIGH (ref 0–0.9)
MONOCYTES NFR BLD AUTO: 8.2 % — SIGNIFICANT CHANGE UP (ref 2–14)
NEUTROPHILS # BLD AUTO: 12.02 K/UL — HIGH (ref 1.8–7.4)
NEUTROPHILS NFR BLD AUTO: 71 % — SIGNIFICANT CHANGE UP (ref 43–77)
PLATELET # BLD AUTO: 444 K/UL — HIGH (ref 150–400)
POTASSIUM SERPL-MCNC: 4.2 MMOL/L — SIGNIFICANT CHANGE UP (ref 3.5–5.3)
POTASSIUM SERPL-SCNC: 4.2 MMOL/L — SIGNIFICANT CHANGE UP (ref 3.5–5.3)
PROT SERPL-MCNC: 5 G/DL — LOW (ref 6–8.3)
RBC # BLD: 2.95 M/UL — LOW (ref 3.8–5.2)
RBC # FLD: 13.5 % — SIGNIFICANT CHANGE UP (ref 10.3–14.5)
SODIUM SERPL-SCNC: 144 MMOL/L — SIGNIFICANT CHANGE UP (ref 135–145)
SPECIMEN SOURCE: SIGNIFICANT CHANGE UP
WBC # BLD: 16.91 K/UL — HIGH (ref 3.8–10.5)
WBC # FLD AUTO: 16.91 K/UL — HIGH (ref 3.8–10.5)

## 2019-03-04 PROCEDURE — 93306 TTE W/DOPPLER COMPLETE: CPT | Mod: 26

## 2019-03-04 PROCEDURE — 70450 CT HEAD/BRAIN W/O DYE: CPT | Mod: 26

## 2019-03-04 PROCEDURE — 99223 1ST HOSP IP/OBS HIGH 75: CPT

## 2019-03-04 RX ORDER — INSULIN LISPRO 100/ML
VIAL (ML) SUBCUTANEOUS EVERY 6 HOURS
Qty: 0 | Refills: 0 | Status: DISCONTINUED | OUTPATIENT
Start: 2019-03-04 | End: 2019-03-18

## 2019-03-04 RX ADMIN — QUETIAPINE FUMARATE 25 MILLIGRAM(S): 200 TABLET, FILM COATED ORAL at 23:48

## 2019-03-04 RX ADMIN — Medication 300 MILLIGRAM(S): at 11:57

## 2019-03-04 RX ADMIN — Medication 1 APPLICATION(S): at 19:37

## 2019-03-04 RX ADMIN — APIXABAN 5 MILLIGRAM(S): 2.5 TABLET, FILM COATED ORAL at 21:41

## 2019-03-04 RX ADMIN — MIDODRINE HYDROCHLORIDE 10 MILLIGRAM(S): 2.5 TABLET ORAL at 21:41

## 2019-03-04 RX ADMIN — Medication 1 DROP(S): at 06:20

## 2019-03-04 RX ADMIN — FAMOTIDINE 20 MILLIGRAM(S): 10 INJECTION INTRAVENOUS at 11:58

## 2019-03-04 RX ADMIN — Medication 1 DROP(S): at 23:49

## 2019-03-04 RX ADMIN — MEROPENEM 100 MILLIGRAM(S): 1 INJECTION INTRAVENOUS at 18:23

## 2019-03-04 RX ADMIN — Medication 1 APPLICATION(S): at 06:20

## 2019-03-04 RX ADMIN — Medication 0.5 MILLIGRAM(S): at 21:41

## 2019-03-04 RX ADMIN — Medication 1.5 MILLIGRAM(S): at 23:48

## 2019-03-04 RX ADMIN — Medication 1 DROP(S): at 18:23

## 2019-03-04 RX ADMIN — MIDODRINE HYDROCHLORIDE 10 MILLIGRAM(S): 2.5 TABLET ORAL at 06:20

## 2019-03-04 RX ADMIN — Medication 1: at 11:57

## 2019-03-04 RX ADMIN — DAPTOMYCIN 109.6 MILLIGRAM(S): 500 INJECTION, POWDER, LYOPHILIZED, FOR SOLUTION INTRAVENOUS at 18:24

## 2019-03-04 RX ADMIN — MEROPENEM 100 MILLIGRAM(S): 1 INJECTION INTRAVENOUS at 06:20

## 2019-03-04 RX ADMIN — GABAPENTIN 300 MILLIGRAM(S): 400 CAPSULE ORAL at 06:20

## 2019-03-04 RX ADMIN — MIDODRINE HYDROCHLORIDE 10 MILLIGRAM(S): 2.5 TABLET ORAL at 14:51

## 2019-03-04 RX ADMIN — APIXABAN 5 MILLIGRAM(S): 2.5 TABLET, FILM COATED ORAL at 06:20

## 2019-03-04 RX ADMIN — Medication 1 DROP(S): at 11:57

## 2019-03-04 RX ADMIN — Medication 7.5 MILLIGRAM(S): at 06:20

## 2019-03-04 RX ADMIN — MICAFUNGIN SODIUM 105 MILLIGRAM(S): 100 INJECTION, POWDER, LYOPHILIZED, FOR SOLUTION INTRAVENOUS at 11:57

## 2019-03-04 RX ADMIN — GABAPENTIN 300 MILLIGRAM(S): 400 CAPSULE ORAL at 23:48

## 2019-03-04 RX ADMIN — Medication 1 DROP(S): at 00:15

## 2019-03-04 NOTE — PROVIDER CONTACT NOTE (OTHER) - SITUATION
Patient with peg tube, hx of advanced dementia and CVA. PEG tube is clogged, attempted to unclog by milking tube and using ginger ale with no success

## 2019-03-04 NOTE — PROGRESS NOTE ADULT - SUBJECTIVE AND OBJECTIVE BOX
---___---___---___---___---___---___ ---___---___---___---___---___---___---___---___---___---                    <<<  M E D I C A L   A T T E N D I N G    F O L L O W    U P   N O T E  >>>    patient's family states that she had slurred speech at home last week and wants a ct scan of the head      ---___---___---___---___---___---      <<<  MEDICATIONS:  >>>    MEDICATIONS  (STANDING):  ALBUTerol    90 MICROgram(s) HFA Inhaler 1 Puff(s) Inhalation every 4 hours  apixaban 5 milliGRAM(s) Oral every 12 hours  artificial  tears Solution 1 Drop(s) Both EYES every 6 hours  clonazePAM Tablet 1.5 milliGRAM(s) Oral at bedtime  Dakins Solution - 1/4 Strength 1 Application(s) Topical two times a day  DAPTOmycin IVPB 240 milliGRAM(s) IV Intermittent every 24 hours  dextrose 5%. 1000 milliLiter(s) (50 mL/Hr) IV Continuous <Continuous>  dextrose 50% Injectable 12.5 Gram(s) IV Push once  dextrose 50% Injectable 25 Gram(s) IV Push once  dextrose 50% Injectable 25 Gram(s) IV Push once  famotidine    Tablet 20 milliGRAM(s) Oral daily  ferrous    sulfate Liquid 300 milliGRAM(s) Oral daily  gabapentin   Solution 300 milliGRAM(s) Oral two times a day  insulin lispro (HumaLOG) corrective regimen sliding scale   SubCutaneous every 6 hours  LORazepam     Tablet 0.5 milliGRAM(s) Oral once  Medical Marijuana Oil 1 milliLiter(s),Medical Marijuana Oil 1 milliLiter(s),Medical Marijuana Oil 1 milliLiter(s) 1 milliLiter(s) Oral <User Schedule>  meropenem  IVPB 1000 milliGRAM(s) IV Intermittent every 12 hours  micafungin IVPB 100 milliGRAM(s) IV Intermittent every 24 hours  micafungin IVPB      midodrine 10 milliGRAM(s) Oral three times a day  predniSONE   Tablet 7.5 milliGRAM(s) Oral daily  QUEtiapine 25 milliGRAM(s) Oral at bedtime  tiotropium 18 MICROgram(s) Capsule 1 Capsule(s) Inhalation daily      MEDICATIONS  (PRN):  acetaminophen    Suspension .. 650 milliGRAM(s) Oral every 6 hours PRN Temp greater or equal to 38C (100.4F)  dextrose 40% Gel 15 Gram(s) Oral once PRN Blood Glucose LESS THAN 70 milliGRAM(s)/deciliter  glucagon  Injectable 1 milliGRAM(s) IntraMuscular once PRN Glucose LESS THAN 70 milligrams/deciliter       ---___---___---___---___---___---     <<<REVIEW OF SYSTEM: >>>    unable to obtain      ---___---___---___---___---___---          <<<  VITAL SIGNS: >>>    T(F): 98 (03-04-19 @ 11:14), Max: 98.1 (03-04-19 @ 06:01)  HR: 92 (03-04-19 @ 11:14) (88 - 98)  BP: 129/79 (03-04-19 @ 11:14) (104/56 - 129/79)  RR: 17 (03-04-19 @ 11:14) (17 - 18)  SpO2: 98% (03-04-19 @ 11:14) (97% - 100%)  Wt(kg): --  CAPILLARY BLOOD GLUCOSE      POCT Blood Glucose.: 188 mg/dL (04 Mar 2019 11:42)    I&O's Summary    03 Mar 2019 07:01  -  04 Mar 2019 07:00  --------------------------------------------------------  IN: 0 mL / OUT: 950 mL / NET: -950 mL    04 Mar 2019 07:01  -  04 Mar 2019 17:30  --------------------------------------------------------  IN: 0 mL / OUT: 1000 mL / NET: -1000 mL         ---___---___---___---___---___---                       PHYSICAL EXAM:    GEN: A&O X 0 , NAD , comfortable  HEENT: NCAT, PERRL, MMM, no scleral icterus, hearing intact  NECK: Supple, No JVD  CVS: S1S2 , regular , No M/R/G appreciated  PULM: CTA B/L,  no W/R/R appreciated  ABD.: soft. non tender, non distended,  bowel sounds present (+) peg noted   Extrem: intact pulses , no edema noted  Derm: No rash or ecchymosis noted  PSYCH: normal mood, no depression, not anxious     ---___---___---___---___---___---     <<<  LAB AND IMAGING: >>>                          8.7    16.91 )-----------( 444      ( 04 Mar 2019 08:44 )             27.5               03-04    144  |  111<H>  |  70<H>  ----------------------------<  101<H>  4.2   |  21<L>  |  0.58    Ca    9.1      04 Mar 2019 05:30  Phos  3.4     03-03  Mg     2.1     03-03    TPro  5.0<L>  /  Alb  2.5<L>  /  TBili  0.2  /  DBili  x   /  AST  20  /  ALT  23  /  AlkPhos  119  03-04                       ABG - ( 03 Mar 2019 08:38 )  pH, Arterial: 7.44  pH, Blood: x     /  pCO2: 29    /  pO2: 83    / HCO3: 19    / Base Excess: -3.6  /  SaO2: 97                      [All pertinent / recent available Imaging reports and other labs reviewed]     ---___---___---___---___---___---___ ---___---___---___---___---           <<<  A S S E S S M E N T   A N D   P L A N :  >>>    fever   sepsis  rule out cva  ct scan of the brain . patient likely wont be able to toelrate mri   anxiety dementia  on klonopin and serqoul   asthma on budesonide   h/o chf 20% ef cardiology following       -GI/DVT Prophylaxis.    --------------------------------------------  Case discussed with   Education given on     >>______________________<<      Deniz Bolden .         phone   [phone]

## 2019-03-04 NOTE — CONSULT NOTE ADULT - SUBJECTIVE AND OBJECTIVE BOX
Patient seen and evaluated @ 9am on 4 [place] with her  at bedside  Chief Complaint: fevers at home    HPI:  NIGHT HOSPITALIST:   Patient UNKNOWN to me previously, assigned to me at this point via the ER and by Dr. Bolden to admit this 69 y/o F--patient seen with patient's spouse and adult daughters in attendance--patient with a history of severely impaired functional and cognitive impairment in the setting of advanced dementia (nonverbal, noncommunicative, chronic gastrostomy tube, bed confined with functional quadriplegia, un stage able sacral and RIGHT hip decubiti, heel ulcers, presumed asthma on a recent tapering solumedrol but is normally apparently steroid dependent on 7.5 mg Prednisone daily, past RIGHT hip MRSA infection, apparent systolic LV dysfunction, benzodiazepine dependent (see [facility] Stop # [id number]) and on skeletal muscle relaxants, multiple recent hospitalizations, with the spouse and adult daughters referring spouse following fevers for the past 2 days and coughing at home.  Loose but formed BM and no recent antibiotic use. (26 Feb 2019 23:06)    PMH:   CVA (cerebral vascular accident)  Fatty pancreas  Fatty liver  PNA (pneumonia)  Pulmonary HTN  IGT (impaired glucose tolerance)  Ulcerative colitis  Acid reflux  Anxiety  Depression  Mouth sores  HLD (hyperlipidemia)  Asthma    PSH:   Humeral head fracture  H/O: hysterectomy  H/O cataract extraction, left  History of knee replacement    Medications:   acetaminophen    Suspension .. 650 milliGRAM(s) Oral every 6 hours PRN  ALBUTerol    90 MICROgram(s) HFA Inhaler 1 Puff(s) Inhalation every 4 hours  apixaban 5 milliGRAM(s) Oral every 12 hours  artificial  tears Solution 1 Drop(s) Both EYES every 6 hours  clonazePAM Tablet 1.5 milliGRAM(s) Oral at bedtime  Dakins Solution - 1/4 Strength 1 Application(s) Topical two times a day  DAPTOmycin IVPB 240 milliGRAM(s) IV Intermittent every 24 hours  dextrose 40% Gel 15 Gram(s) Oral once PRN  dextrose 5%. 1000 milliLiter(s) IV Continuous <Continuous>  dextrose 50% Injectable 12.5 Gram(s) IV Push once  dextrose 50% Injectable 25 Gram(s) IV Push once  dextrose 50% Injectable 25 Gram(s) IV Push once  famotidine    Tablet 20 milliGRAM(s) Oral daily  ferrous    sulfate Liquid 300 milliGRAM(s) Oral daily  gabapentin   Solution 300 milliGRAM(s) Oral two times a day  glucagon  Injectable 1 milliGRAM(s) IntraMuscular once PRN  insulin lispro (HumaLOG) corrective regimen sliding scale   SubCutaneous every 6 hours  Medical Marijuana Oil 1 milliLiter(s),Medical Marijuana Oil 1 milliLiter(s),Medical Marijuana Oil 1 milliLiter(s) 1 milliLiter(s) Oral <User Schedule>  meropenem  IVPB 1000 milliGRAM(s) IV Intermittent every 12 hours  micafungin IVPB 100 milliGRAM(s) IV Intermittent every 24 hours  micafungin IVPB      midodrine 10 milliGRAM(s) Oral three times a day  predniSONE   Tablet 7.5 milliGRAM(s) Oral daily  QUEtiapine 25 milliGRAM(s) Oral at bedtime  tiotropium 18 MICROgram(s) Capsule 1 Capsule(s) Inhalation daily    Allergies:  ASA; dye contrast (Anaphylaxis)  aspirin (Short breath)  divalproex sodium (Other (Unknown))  Haldol (Other (Unknown))  penicillin (Short breath; Rash)  sulfa drugs (Short breath; Rash)  vancomycin (Rash; Urticaria; Hives)  Xanax (Other (Unknown))    FAMILY HISTORY:  Family history of dementia (Grandparent)  Family history of colon cancer (Grandparent)    Social History: , lives with  and daughters at home, completely dependent for ADLs    Review of Systems:  [x]Unable to obtain due to advanced dementia    Physical Exam:  T(C): 36.7 (03-04-19 @ 06:01), Max: 38.3 (03-03-19 @ 10:00)  HR: 98 (03-04-19 @ 06:01) (88 - 110)  BP: 114/64 (03-04-19 @ 06:01) (104/56 - 152/80)  RR: 18 (03-04-19 @ 06:01) (18 - 30)  SpO2: 97% (03-04-19 @ 06:01) (96% - 100%)  Wt(kg): --    03-03 @ 07:01  -  03-04 @ 07:00  --------------------------------------------------------  IN: 0 mL / OUT: 950 mL / NET: -950 mL      Daily     Daily     Appearance: functional quadriplegia   Eyes: PERRLA, EOMI, pink conjunctiva, no scleral icterus   HENT: Normal oral mucosa  Cardiovascular: RRR, S1, S2, no murmur, rub, or gallop; + edema in hands and feet; +JVD  Procedural Access Site: Clean, dry, intact, without hematoma  Respiratory: bilateral air entry; poor inspiratory effort  Gastrointestinal: Soft, non-tender, non-distended, BS+, +PEG  Musculoskeletal: +contracted  Neurologic: functional quardiplegia  Lymphatic: No lymphadenopathy  Psychiatry: advanced dementia  Skin: No rashes, ecchymoses, or cyanosis on extremities    Cardiovascular Diagnostic Testing:    Echo: < from: Transthoracic Echocardiogram (12.03.18 @ 11:23) >  EF (Teicholtz): 26 %  Doppler Peak Velocity (m/sec): AoV=0.9  ------------------------------------------------------------------------  Observations:  Mitral Valve: Mitral annular calcification, otherwise  normal mitral valve. Moderate-severe mitral regurgitation  (vena contracta 0.8 cm)  Aortic Valve/Aorta: Calcified trileaflet aortic valve with  normal opening. Peak transaortic valve gradient equals 3 mm  Hg, mean transaortic valve gradient equals 1 mm Hg, aortic  valve velocity time integral equals 13 cm. Moderate aortic  regurgitation  Aortic Root: 2.9 cm.  Left Atrium: Moderately dilated left atrium.  LA volume  index = 45 cc/m2.  Left Ventricle: Severe global left ventricular systolic  dysfunction. Mild left ventricular enlargement. Increased  E/e'  is consistent with elevated left ventricular filling  pressure.  Right Heart: Moderate right atrial enlargement. Inferior  vena cava is dilated (>=2.5cm) with normal respiratory  variability consistent with right atrial pressure 16-20mm  Hg. Right ventricular enlargement with decreased right  ventricular systolic function. Normal tricuspid valve.  Severe tricuspid regurgitation. Pulmonic valve not well  visualized.  Pericardium/Pleura: Normal pericardium with no pericardial  effusion.  Hemodynamic: Estimated right atrial pressure is 8 mm Hg.  Estimated right ventricular systolic pressure equals 82 mm  Hg, assuming right atrial pressure equals 8 mm Hg,  consistent with severe pulmonary hypertension. Color  Doppler demonstrates evidence of left to right shunt  ------------------------------------------------------------------------  Conclusions:  1. Mitral annular calcification, otherwise normal mitral  valve. Moderate-severe mitral regurgitation (vena contracta  0.8 cm)  2. Calcified trileafletaortic valve with normal opening.  Peak transaortic valve gradient equals 3 mm Hg, mean  transaortic valve gradient equals 1 mm Hg, aortic valve  velocity time integral equals 13 cm. Moderate aortic  regurgitation  3. Moderately dilated left atrium.LA volume index = 45  cc/m2.  4. Severe global left ventricular systolic dysfunction.  5. Increased E/e'  is consistent with elevated left  ventricular filling pressure.  6. Moderate right atrial enlargement. Inferior vena cava is  dilated (>=2.5cm) with normal respiratory variability  consistent with right atrial pressure 16-20mm Hg.  7. Right ventricular enlargement with decreased right  ventricular systolic function.  8. Normal tricuspid valve. Severe tricuspid regurgitation.  9. Estimated pulmonary artery systolic pressure equals 82  mm Hg, assuming right atrial pressure equals 8 mm Hg,  consistent with severe pulmonary pressures.  10. Color Doppler demonstrates evidence of left to right  shunt  ------------------------------------------------------------------------  Confirmed on  12/3/2018 - 16:37:07 by Margaret Khan M.D.  ------------------------------------------------------------------------    < end of copied text >    Interpretation of Telemetry: NSR 80 - 90 bpm    Labs:                        8.7    16.91 )-----------( 444      ( 04 Mar 2019 08:44 )             27.5     03-04    144  |  111<H>  |  70<H>  ----------------------------<  101<H>  4.2   |  21<L>  |  0.58    Ca    9.1      04 Mar 2019 05:30  Phos  3.4     03-03  Mg     2.1     03-03    TPro  5.0<L>  /  Alb  2.5<L>  /  TBili  0.2  /  DBili  x   /  AST  20  /  ALT  23  /  AlkPhos  119  03-04          Serum Pro-Brain Natriuretic Peptide: 5829 pg/mL (02-27 @ 19:22)      Hemoglobin A1C, Whole Blood: 5.3 % (02-27 @ 20:13)

## 2019-03-04 NOTE — PROGRESS NOTE ADULT - ASSESSMENT
69 yo f with multiple medical conditions admitted 2/26 with fever to 103.  She has spent the greater past of past 9 months in the hospital.  She had difficult to control MRSA bacteremia, finally controlled after RT hip was debrided and explanted.  s/p multiple treatment for pneumonia, stage 4 decubitus ,peg, and chronic gavin.  No obvious primary site for infection   She also has a chronic DVT and had unexplained fevers x 6 weeks during last hospital stay  1/4 bottles with a coag negative staph, likely a contaminant. CDT is negative  CT: patchy b/l groundglass opacities, no new consolidations, no acute abd pathology.  Her chest CT scan is unchanged c/w 2016, hence no evidence of an acute or subacute infection.  Rt hip is without collection and sacral decubitus is chronic.  We may be confronted with approaching her as a FUO as she did not respond to meropeenem, tigecycline, and is now on empiric dapto and micafungin.  Drug fever has been a concern in past.  S/P repeat cultures past few days    Suggest::  Await repeat cultures  continue meropenem, will likely limit to 7-10 days   Daptomycin, Amikacin and micafungin, all empiric  s/p a dose of Zyvox 3/3  d/w pt  at bedside , comfort care has been considered in past  Consider a palliative care evaluation,goals of care will be important going foward  prognosis seems extremely poor  >35 min spent reviewing pt labs, radiology reports, micro data

## 2019-03-04 NOTE — PROGRESS NOTE ADULT - SUBJECTIVE AND OBJECTIVE BOX
CC: f/u for fever    Patient reports: she is non verbal, no events overnight    REVIEW OF SYSTEMS:  All other review of systems negative (Comprehensive ROS): limited, non verbal    Antimicrobials Day #  :day 6 meropenem, day 2 dapto amd micafungin,previously started on 2/28 by myself and stopped 3/1  DAPTOmycin IVPB 240 milliGRAM(s) IV Intermittent every 24 hours  meropenem  IVPB 1000 milliGRAM(s) IV Intermittent every 12 hours  micafungin IVPB 100 milliGRAM(s) IV Intermittent every 24 hours  micafungin IVPB        Other Medications Reviewed    T(F): 98.1 (03-04-19 @ 06:01), Max: 101 (03-03-19 @ 10:00)  HR: 98 (03-04-19 @ 06:01)  BP: 114/64 (03-04-19 @ 06:01)  RR: 18 (03-04-19 @ 06:01)  SpO2: 97% (03-04-19 @ 06:01)  Wt(kg): --    PHYSICAL EXAM:  General: lethargic, no acute distress  Eyes:  anicteric, no conjunctival injection, no discharge  Oropharynx: no lesions or injection 	  Neck: supple, without adenopathy  Lungs: coarse BS  Heart: regular rate and rhythm; no murmur, rubs or gallops  Abdomen: soft, nondistended, nontender, peg in place  Skin: no lesions  Extremities: contracted  Neurologic: lethargic, poorly interactive    LAB RESULTS:                        9.2    23.84 )-----------( 551      ( 03 Mar 2019 09:46 )             30.5     03-04    144  |  111<H>  |  70<H>  ----------------------------<  101<H>  4.2   |  21<L>  |  0.58    Ca    9.1      04 Mar 2019 05:30  Phos  3.4     03-03  Mg     2.1     03-03    TPro  5.0<L>  /  Alb  2.5<L>  /  TBili  0.2  /  DBili  x   /  AST  20  /  ALT  23  /  AlkPhos  119  03-04    LIVER FUNCTIONS - ( 04 Mar 2019 05:30 )  Alb: 2.5 g/dL / Pro: 5.0 g/dL / ALK PHOS: 119 U/L / ALT: 23 U/L / AST: 20 U/L / GGT: x             MICROBIOLOGY:  RECENT CULTURES:  03-01 @ 06:52 .Blood Blood-Venous     No growth to date.      03-01 @ 03:31 .Blood Blood-Peripheral     No growth to date.          RADIOLOGY REVIEWED:  < from: Xray Chest 1 View AP/PA (03.03.19 @ 07:36) >  IMPRESSION:   Interval removal of right-sided central line.  No focal consolidations.    < end of copied text >  < from: CT Abdomen and Pelvis w/ Oral Cont (03.02.19 @ 08:46) >  IMPRESSION:   Limited exam.    Left-sided sacral decubitus ulcer without definite cortical erosion of   the underlying sacrum. MRI would be more sensitive to evaluate for   osteomyelitis.    No acute intra-abdominal pathology or collection.    < end of copied text >  < from: CT Chest No Cont (03.01.19 @ 16:32) >  IMPRESSION:    Patchy bilateral groundglass opacities, unchanged since 2016. No new   consolidations.    Incidental findings as above

## 2019-03-04 NOTE — CONSULT NOTE ADULT - ASSESSMENT
68 year-old woman with advanced dementia and functional quadriplegia presents with fevers of unknown origin.  Leukocytosis seen improving today after broadening if antibiotic regimen although source remains unclear.    Patient is breathing comfortably and does not appear to have decompensated heart failure at this time.  She is making adequate urine and has normal renal function.  Maintaining normal sinus rhythm on telemetry.  Continue apixaban 5 mg BID for patient with age < 80 and creatinine < 1.5 mg/dL in patient with history of DVTs.  For patient with known cardiomyopathy, recommend trying to titrate down midodrine. If BP permits, would recommend discontinuing midodrine over time and hoping to start patient on ACEi/ARB and beta-blocker.    Recommend allowing her to mobilize fluids without additional diuretics at this time.    Please reconsult with any active cardiovascular issues, although I will continue to see patient at her , Caelb's, request.

## 2019-03-05 LAB
ANION GAP SERPL CALC-SCNC: 14 MMOL/L — SIGNIFICANT CHANGE UP (ref 5–17)
BASOPHILS # BLD AUTO: 0.05 K/UL — SIGNIFICANT CHANGE UP (ref 0–0.2)
BASOPHILS NFR BLD AUTO: 0.3 % — SIGNIFICANT CHANGE UP (ref 0–2)
BUN SERPL-MCNC: 55 MG/DL — HIGH (ref 7–23)
CALCIUM SERPL-MCNC: 9.4 MG/DL — SIGNIFICANT CHANGE UP (ref 8.4–10.5)
CHLORIDE SERPL-SCNC: 108 MMOL/L — SIGNIFICANT CHANGE UP (ref 96–108)
CO2 SERPL-SCNC: 23 MMOL/L — SIGNIFICANT CHANGE UP (ref 22–31)
CREAT SERPL-MCNC: 0.45 MG/DL — LOW (ref 0.5–1.3)
EOSINOPHIL # BLD AUTO: 1.14 K/UL — HIGH (ref 0–0.5)
EOSINOPHIL NFR BLD AUTO: 7.4 % — HIGH (ref 0–6)
GLUCOSE BLDC GLUCOMTR-MCNC: 102 MG/DL — HIGH (ref 70–99)
GLUCOSE BLDC GLUCOMTR-MCNC: 116 MG/DL — HIGH (ref 70–99)
GLUCOSE BLDC GLUCOMTR-MCNC: 150 MG/DL — HIGH (ref 70–99)
GLUCOSE BLDC GLUCOMTR-MCNC: 202 MG/DL — HIGH (ref 70–99)
GLUCOSE SERPL-MCNC: 138 MG/DL — HIGH (ref 70–99)
HCT VFR BLD CALC: 29.3 % — LOW (ref 34.5–45)
HGB BLD-MCNC: 8.9 G/DL — LOW (ref 11.5–15.5)
IMM GRANULOCYTES NFR BLD AUTO: 0.6 % — SIGNIFICANT CHANGE UP (ref 0–1.5)
LYMPHOCYTES # BLD AUTO: 20.8 % — SIGNIFICANT CHANGE UP (ref 13–44)
LYMPHOCYTES # BLD AUTO: 3.23 K/UL — SIGNIFICANT CHANGE UP (ref 1–3.3)
MCHC RBC-ENTMCNC: 28.7 PG — SIGNIFICANT CHANGE UP (ref 27–34)
MCHC RBC-ENTMCNC: 30.4 GM/DL — LOW (ref 32–36)
MCV RBC AUTO: 94.5 FL — SIGNIFICANT CHANGE UP (ref 80–100)
MONOCYTES # BLD AUTO: 1.4 K/UL — HIGH (ref 0–0.9)
MONOCYTES NFR BLD AUTO: 9 % — SIGNIFICANT CHANGE UP (ref 2–14)
NEUTROPHILS # BLD AUTO: 9.59 K/UL — HIGH (ref 1.8–7.4)
NEUTROPHILS NFR BLD AUTO: 61.9 % — SIGNIFICANT CHANGE UP (ref 43–77)
PLATELET # BLD AUTO: 468 K/UL — HIGH (ref 150–400)
POTASSIUM SERPL-MCNC: 4 MMOL/L — SIGNIFICANT CHANGE UP (ref 3.5–5.3)
POTASSIUM SERPL-SCNC: 4 MMOL/L — SIGNIFICANT CHANGE UP (ref 3.5–5.3)
RBC # BLD: 3.1 M/UL — LOW (ref 3.8–5.2)
RBC # FLD: 13.5 % — SIGNIFICANT CHANGE UP (ref 10.3–14.5)
SODIUM SERPL-SCNC: 145 MMOL/L — SIGNIFICANT CHANGE UP (ref 135–145)
WBC # BLD: 15.51 K/UL — HIGH (ref 3.8–10.5)
WBC # FLD AUTO: 15.51 K/UL — HIGH (ref 3.8–10.5)

## 2019-03-05 PROCEDURE — 99232 SBSQ HOSP IP/OBS MODERATE 35: CPT

## 2019-03-05 RX ADMIN — MEROPENEM 100 MILLIGRAM(S): 1 INJECTION INTRAVENOUS at 06:27

## 2019-03-05 RX ADMIN — Medication 1 APPLICATION(S): at 06:28

## 2019-03-05 RX ADMIN — GABAPENTIN 300 MILLIGRAM(S): 400 CAPSULE ORAL at 23:15

## 2019-03-05 RX ADMIN — MIDODRINE HYDROCHLORIDE 10 MILLIGRAM(S): 2.5 TABLET ORAL at 13:40

## 2019-03-05 RX ADMIN — MEROPENEM 100 MILLIGRAM(S): 1 INJECTION INTRAVENOUS at 17:16

## 2019-03-05 RX ADMIN — Medication 2: at 12:27

## 2019-03-05 RX ADMIN — APIXABAN 5 MILLIGRAM(S): 2.5 TABLET, FILM COATED ORAL at 20:31

## 2019-03-05 RX ADMIN — MICAFUNGIN SODIUM 105 MILLIGRAM(S): 100 INJECTION, POWDER, LYOPHILIZED, FOR SOLUTION INTRAVENOUS at 11:01

## 2019-03-05 RX ADMIN — Medication 1 DROP(S): at 17:11

## 2019-03-05 RX ADMIN — Medication 1 DROP(S): at 13:40

## 2019-03-05 RX ADMIN — Medication 300 MILLIGRAM(S): at 11:03

## 2019-03-05 RX ADMIN — Medication 7.5 MILLIGRAM(S): at 06:27

## 2019-03-05 RX ADMIN — Medication 1.5 MILLIGRAM(S): at 23:15

## 2019-03-05 RX ADMIN — Medication 1 DROP(S): at 23:15

## 2019-03-05 RX ADMIN — Medication 1 DROP(S): at 06:28

## 2019-03-05 RX ADMIN — DAPTOMYCIN 109.6 MILLIGRAM(S): 500 INJECTION, POWDER, LYOPHILIZED, FOR SOLUTION INTRAVENOUS at 17:44

## 2019-03-05 RX ADMIN — QUETIAPINE FUMARATE 25 MILLIGRAM(S): 200 TABLET, FILM COATED ORAL at 23:15

## 2019-03-05 RX ADMIN — APIXABAN 5 MILLIGRAM(S): 2.5 TABLET, FILM COATED ORAL at 08:32

## 2019-03-05 RX ADMIN — GABAPENTIN 300 MILLIGRAM(S): 400 CAPSULE ORAL at 11:02

## 2019-03-05 RX ADMIN — Medication 1 APPLICATION(S): at 17:12

## 2019-03-05 RX ADMIN — FAMOTIDINE 20 MILLIGRAM(S): 10 INJECTION INTRAVENOUS at 11:03

## 2019-03-05 NOTE — PROGRESS NOTE ADULT - SUBJECTIVE AND OBJECTIVE BOX
SUBJECTIVE: Pt seen, chart reviewed.   at bedside- all questions asked and answered to his satisfaction  No odor, redness, warmth, excess drainage noted  today no f/c/s  pt more alert, nonverbal, follows w/ eyes   concerned because no definitive source for infection  Pt was in MICU and now on tele  also condition of her skin- some areas healing /others appearing - we discussed skin failure in critically ill patients- he expressed understanding of how ill his wife is and her difficulty to enable wound healing when she is medically frail  pt was also NPO for several days  (+)incontinent (+)sedentary. Offloading & pericare initiated upon admission & on going     ROS skin/msk    apixaban 5 milliGRAM(s) Oral every 12 hours  DAPTOmycin IVPB 240 milliGRAM(s) IV Intermittent every 24 hours  meropenem  IVPB 1000 milliGRAM(s) IV Intermittent every 12 hours  micafungin IVPB 100 milliGRAM(s) IV Intermittent every 24 hours      Physical Exam:  Vital Signs Last 24 Hrs  T(C): 36.7 (05 Mar 2019 13:39), Max: 37.7 (04 Mar 2019 21:40)  T(F): 98.1 (05 Mar 2019 13:39), Max: 99.9 (04 Mar 2019 21:40)  HR: 99 (05 Mar 2019 13:39) (91 - 105)  BP: 99/61 (05 Mar 2019 13:39) (99/61 - 145/79)  BP(mean): --  RR: 16 (05 Mar 2019 13:39) (16 - 18)  SpO2: 98% (05 Mar 2019 13:39) (96% - 98%)  General Appearance:  NAD, Alert, cachectic  Versa Care P500    Musculoskeletal/Vascular:   no BLE edema   BLE equally warm  no acute ischemia noted  BUE/BLE w/ contractures    Skin:  dry & frail      Rt Medial bunion w/ 1cm x 0.3cm x 0cm DTI w/ blanchable erythema  Rt Heel w/ blanchable erythema w/o wounds  Lt Lateral malleolus 1.5cm x 0.8cm x 0cm DTI   Rt medial knee w/ 2.5cm x 2.5cm x 0cm DTI  No blistering nor open wounds  No drainage, odor, erythema, increased warmth, tenderness, induration, fluctuance    Stage 4 Sacral pressure injury  7.5cm x 8cm x 2.6cm w/ undermining 7-1 o'clock w/ greatest at 12 of 4.6cm  less necrotic & nonviable tissue  scant moist & granular  (+) serosanguinous drainage    Rt Ischium 5cm x 3.5cm x 0cm  unstageable w/ soft moist nonviable tissue   periphery of wound reepithelializing w/o drainage    No odor, erythema, increased warmth, tenderness, induration, fluctuance      LABS:                        8.9    15.51 )-----------( 468      ( 05 Mar 2019 08:30 )             29.3         RADIOLOGY & ADDITIONAL STUDIES:  < from: CT Abdomen and Pelvis w/ Oral Cont (03.02.19 @ 08:46) >  FINDINGS:    Limited evaluation of the solid visceral organs and vasculature without   intravenous contrast. Slightly motion degraded study.    LOWER CHEST: Coronary artery calcifications.    LIVER: Within normal limits.  BILE DUCTS: Normal caliber.  GALLBLADDER: Cholecystectomy.  SPLEEN: Within normal limits.  PANCREAS: Within normal limits.  ADRENALS: Within normal limits.  KIDNEYS/URETERS: Within normal limits.    BLADDER: Collapsed around a Blackman catheter.  REPRODUCTIVE ORGANS: Limited evaluation secondary to streak artifact from   right lower extremity hardware.    BOWEL: Percutaneous gastrostomy tube in place. No bowel obstruction.   Appendix not visualized.  PERITONEUM: No ascites.  VESSELS:  Atherosclerotic calcifications in the abdominal aorta and its   branches.  RETROPERITONEUM: No lymphadenopathy.    ABDOMINAL WALL: Anasarca. Left-sided sacral decubitus ulcer abutting the   sacrum without definite cortical erosion.  BONES: Right hip arthroplasty. Generalized osteopenia. Degenerative   changes. Complete collapse of L2 vertebral body, unchanged. Grade 1   anterolisthesis of L4 on L5. Chronic left acetabular fracture.    IMPRESSION:   Limited exam.    Left-sided sacral decubitus ulcer without definite cortical erosion of   the underlying sacrum. MRI would be more sensitive to evaluate for   osteomyelitis.    No acute intra-abdominal pathology or collection.          CULTURES:  Culture - Blood (03.03.19 @ 17:14)    Specimen Source: .Blood Blood-Peripheral    Culture Results:   No growth to date.

## 2019-03-05 NOTE — PROVIDER CONTACT NOTE (CRITICAL VALUE NOTIFICATION) - RECOMMENDATIONS
I followed the heparin nomogram. It instructs to decrease heparin by 2. New rate is 16ml/hr. PTT is due again at 3:56.

## 2019-03-05 NOTE — PROGRESS NOTE ADULT - SUBJECTIVE AND OBJECTIVE BOX
---___---___---___---___---___---___ ---___---___---___---___---___---___---___---___---___---                    <<<  M E D I C A L   A T T E N D I N G    F O L L O W    U P   N O T E  >>>    wbc trending down . follows commands minimally verbal      ---___---___---___---___---___---      <<<  MEDICATIONS:  >>>    MEDICATIONS  (STANDING):  ALBUTerol    90 MICROgram(s) HFA Inhaler 1 Puff(s) Inhalation every 4 hours  apixaban 5 milliGRAM(s) Oral every 12 hours  artificial  tears Solution 1 Drop(s) Both EYES every 6 hours  clonazePAM Tablet 1.5 milliGRAM(s) Oral at bedtime  Dakins Solution - 1/4 Strength 1 Application(s) Topical two times a day  DAPTOmycin IVPB 240 milliGRAM(s) IV Intermittent every 24 hours  dextrose 5%. 1000 milliLiter(s) (50 mL/Hr) IV Continuous <Continuous>  dextrose 50% Injectable 12.5 Gram(s) IV Push once  dextrose 50% Injectable 25 Gram(s) IV Push once  dextrose 50% Injectable 25 Gram(s) IV Push once  famotidine    Tablet 20 milliGRAM(s) Oral daily  ferrous    sulfate Liquid 300 milliGRAM(s) Oral daily  gabapentin   Solution 300 milliGRAM(s) Oral two times a day  insulin lispro (HumaLOG) corrective regimen sliding scale   SubCutaneous every 6 hours  Medical Marijuana Oil 1 milliLiter(s),Medical Marijuana Oil 1 milliLiter(s),Medical Marijuana Oil 1 milliLiter(s) 1 milliLiter(s) Oral <User Schedule>  meropenem  IVPB 1000 milliGRAM(s) IV Intermittent every 12 hours  micafungin IVPB 100 milliGRAM(s) IV Intermittent every 24 hours  micafungin IVPB      midodrine 10 milliGRAM(s) Oral three times a day  predniSONE   Tablet 7.5 milliGRAM(s) Oral daily  QUEtiapine 25 milliGRAM(s) Oral at bedtime  tiotropium 18 MICROgram(s) Capsule 1 Capsule(s) Inhalation daily      MEDICATIONS  (PRN):  acetaminophen    Suspension .. 650 milliGRAM(s) Oral every 6 hours PRN Temp greater or equal to 38C (100.4F)  dextrose 40% Gel 15 Gram(s) Oral once PRN Blood Glucose LESS THAN 70 milliGRAM(s)/deciliter  glucagon  Injectable 1 milliGRAM(s) IntraMuscular once PRN Glucose LESS THAN 70 milligrams/deciliter       ---___---___---___---___---___---     <<<REVIEW OF SYSTEM: >>>  unable to obtain      ---___---___---___---___---___---          <<<  VITAL SIGNS: >>>    T(F): 96.7 (03-05-19 @ 05:26), Max: 99.9 (03-04-19 @ 21:40)  HR: 99 (03-05-19 @ 05:26) (91 - 99)  BP: 124/72 (03-05-19 @ 05:26) (119/79 - 145/79)  RR: 18 (03-05-19 @ 05:26) (17 - 18)  SpO2: 98% (03-05-19 @ 05:26) (96% - 98%)  Wt(kg): --  CAPILLARY BLOOD GLUCOSE      POCT Blood Glucose.: 150 mg/dL (05 Mar 2019 06:43)    I&O's Summary    04 Mar 2019 07:01  -  05 Mar 2019 07:00  --------------------------------------------------------  IN: 0 mL / OUT: 1850 mL / NET: -1850 mL         ---___---___---___---___---___---                       PHYSICAL EXAM:    GEN: A&O X 0 , NAD , comfortable  HEENT: NCAT, PERRL, MMM, no scleral icterus, hearing intact  NECK: Supple, No JVD  CVS: S1S2 , regular , No M/R/G appreciated  PULM: CTA B/L,  no W/R/R appreciated  ABD.: soft. non tender, non distended,  bowel sounds present  Extrem: intact pulses , no edema noted  Derm: No rash or ecchymosis noted sacral decubitus noted         ---___---___---___---___---___---     <<<  LAB AND IMAGING: >>>                          8.7    16.91 )-----------( 444      ( 04 Mar 2019 08:44 )             27.5               03-05    145  |  108  |  55<H>  ----------------------------<  138<H>  4.0   |  23  |  0.45<L>    Ca    9.4      05 Mar 2019 05:41    TPro  5.0<L>  /  Alb  2.5<L>  /  TBili  0.2  /  DBili  x   /  AST  20  /  ALT  23  /  AlkPhos  119  03-04                       ABG - ( 03 Mar 2019 08:38 )  pH, Arterial: 7.44  pH, Blood: x     /  pCO2: 29    /  pO2: 83    / HCO3: 19    / Base Excess: -3.6  /  SaO2: 97                      [All pertinent / recent available Imaging reports and other labs reviewed]     ---___---___---___---___---___---___ ---___---___---___---___---           <<<  A S S E S S M E N T   A N D   P L A N :  >>>    sepsis continue iv abx and antifungal   demenia supportive care   anxiety  aseroquel and klonopin and remeron   h/o dvt on eliquis   collapsed vertebral fracture continue medical marijuana for pain control  asthma on budesonide  chf cardiology following     -GI/DVT Prophylaxis.    --------------------------------------------  Case discussed with   Education given on     >>______________________<<      Deniz Bolden .         phone   [phone]

## 2019-03-05 NOTE — PROGRESS NOTE ADULT - SUBJECTIVE AND OBJECTIVE BOX
CC: f/u for fever    Patient reports: she is awake, not verbal, not very interactive    REVIEW OF SYSTEMS:  All other review of systems negative (Comprehensive ROS):same    Antimicrobials Day #  :day 7 meropenem, day 3 dapto amd micafungin  DAPTOmycin IVPB 240 milliGRAM(s) IV Intermittent every 24 hours  meropenem  IVPB 1000 milliGRAM(s) IV Intermittent every 12 hours  micafungin IVPB 100 milliGRAM(s) IV Intermittent every 24 hours  micafungin IVPB        Other Medications Reviewed    T(F): 98.1 (03-05-19 @ 13:39), Max: 99.9 (03-04-19 @ 21:40)  HR: 99 (03-05-19 @ 13:39)  BP: 99/61 (03-05-19 @ 13:39)  RR: 16 (03-05-19 @ 13:39)  SpO2: 98% (03-05-19 @ 13:39)  Wt(kg): --    PHYSICAL EXAM:  General: awake, no acute distress, not following comands  Eyes:  anicteric, no conjunctival injection, no discharge  Oropharynx: no lesions or injection 	  Neck: supple, without adenopathy  Lungs: clear to auscultation, diminished at bases  Heart: regular rate and rhythm; no murmur, rubs or gallops  Abdomen: soft, nondistended, nontender, peg in place  Skin: no lesions  Extremities: contracted  Neurologic: awake, poorly interactive    LAB RESULTS:                        8.9    15.51 )-----------( 468      ( 05 Mar 2019 08:30 )             29.3     03-05    145  |  108  |  55<H>  ----------------------------<  138<H>  4.0   |  23  |  0.45<L>    Ca    9.4      05 Mar 2019 05:41    TPro  5.0<L>  /  Alb  2.5<L>  /  TBili  0.2  /  DBili  x   /  AST  20  /  ALT  23  /  AlkPhos  119  03-04    LIVER FUNCTIONS - ( 04 Mar 2019 05:30 )  Alb: 2.5 g/dL / Pro: 5.0 g/dL / ALK PHOS: 119 U/L / ALT: 23 U/L / AST: 20 U/L / GGT: x             MICROBIOLOGY:  RECENT CULTURES:  03-03 @ 17:14 .Blood Blood-Peripheral     No growth to date.      03-03 @ 11:53 .Blood Blood-Peripheral     No growth to date.      03-01 @ 06:52 .Blood Blood-Venous     No growth to date.      03-01 @ 03:31 .Blood Blood-Peripheral     No growth to date.          RADIOLOGY REVIEWED:  < from: CT Head No Cont (03.04.19 @ 22:39) >    IMPRESSION:    No acute intracranial hemorrhage, mass effect, vasogenic edema, or   evidence of acute territorial infarct.    Slight interval enlargement of the ventricles and sulci, consistent with   progressive atrophy.    Slightly increased nonspecific periventricular lucency may represent   white matter microvascular ischemic disease.    < end of copied text >

## 2019-03-05 NOTE — PROGRESS NOTE ADULT - ASSESSMENT
A/P: 67 yo female with multiple medical conditions admitted 2/26 with fever to 103.    Sacral stage 4 pressure injury- dakins packng  Rt Ischium unstageable pressure injury- comfeel  Evolving DTI BLE - CAVILON  Consider MRI, CT  BLE elevation  Abx per Medicine/ ID  Moisturize intact skin w/ SWEEN cream BID  con't Nutrition (as tolerated), Nutrition Consult  con't Offloading   con't Pericare  Care as per medicine will follow w/ you  Upon discharge f/u as outpatient at [facility] [address] [facility] [phone]  Seen w/ attng and D/w team  Mona Quick PA-C CWS [id number]  I spent 25  minutes w/ this pt of which more than 50% of the time was spent counseling & coordinating care of this pt.

## 2019-03-05 NOTE — CHART NOTE - NSCHARTNOTEFT_GEN_A_CORE
Nutrition Follow Up Note  Patient seen for: malnutrition follow-up     Patient seen for malnutrition follow up. 68y F with PMH of HFrEF (EF 20%), advanced dementia, bedbound with stage 4 sacral and R hip decubitus ulcer c/b R hip MRSA infection s/p PEG and chronic gavin, presumed asthma on steroids, multiple recent prolonged hospitalizations, presented to the hospital on 2/26 with fevers to 103 as well as cough and loose BMs, found to be septic likely 2/2 UTI vs sacral ulcer vs PNA, course c/b septic shock with SBP 60's now on norepinephrine. Pt noted with clogged PEG tube yesterday, Viokase used to unclog peg tube.    Source: medical  record,  at bedside, pt discussed during rounds, pt non-verbal     Diet : NPO with enteral feeds      Enteral /Parenteral Nutrition:  Pt observed with Glucerna 1.2 infusing @ 50ml/hr x 24 hrs.  Regimen provides 1200ml total volume 1440 Kcal, 72 gm protein. TF + pro-Source TID provides 1620 kcal and 117gm protein. Feeds switched from bolus to continuous feeds yesterday. Pt previously on bolus feeds of Glucerna 1.2 bolus feeds of 240 mls every 6 hours (4 x daily).  wishes for pt to be placed back on bolus feeds as pt was tolerating bolus regimen at home well.  reports BMs controlled yesterday, pt noted with 1 BM yesterday (3/4 and 1 BM today thus far (3/5)    Weights:   Dosing weight : 38.5 Kg (2/27)  New dosing weight 40.5 Kg (3/2) ? accuracy, will continue to monitor.     Pertinent Medications: MEDICATIONS  (STANDING):  ALBUTerol    90 MICROgram(s) HFA Inhaler 1 Puff(s) Inhalation every 4 hours  apixaban 5 milliGRAM(s) Oral every 12 hours  artificial  tears Solution 1 Drop(s) Both EYES every 6 hours  clonazePAM Tablet 1.5 milliGRAM(s) Oral at bedtime  Dakins Solution - 1/4 Strength 1 Application(s) Topical two times a day  DAPTOmycin IVPB 240 milliGRAM(s) IV Intermittent every 24 hours  dextrose 5%. 1000 milliLiter(s) (50 mL/Hr) IV Continuous <Continuous>  dextrose 50% Injectable 12.5 Gram(s) IV Push once  dextrose 50% Injectable 25 Gram(s) IV Push once  dextrose 50% Injectable 25 Gram(s) IV Push once  famotidine    Tablet 20 milliGRAM(s) Oral daily  ferrous    sulfate Liquid 300 milliGRAM(s) Oral daily  gabapentin   Solution 300 milliGRAM(s) Oral two times a day  insulin lispro (HumaLOG) corrective regimen sliding scale   SubCutaneous every 6 hours  Medical Marijuana Oil 1 milliLiter(s),Medical Marijuana Oil 1 milliLiter(s),Medical Marijuana Oil 1 milliLiter(s) 1 milliLiter(s) Oral <User Schedule>  meropenem  IVPB 1000 milliGRAM(s) IV Intermittent every 12 hours  micafungin IVPB 100 milliGRAM(s) IV Intermittent every 24 hours  micafungin IVPB      midodrine 10 milliGRAM(s) Oral three times a day  predniSONE   Tablet 7.5 milliGRAM(s) Oral daily  QUEtiapine 25 milliGRAM(s) Oral at bedtime  tiotropium 18 MICROgram(s) Capsule 1 Capsule(s) Inhalation daily    MEDICATIONS  (PRN):  acetaminophen    Suspension .. 650 milliGRAM(s) Oral every 6 hours PRN Temp greater or equal to 38C (100.4F)  dextrose 40% Gel 15 Gram(s) Oral once PRN Blood Glucose LESS THAN 70 milliGRAM(s)/deciliter  glucagon  Injectable 1 milliGRAM(s) IntraMuscular once PRN Glucose LESS THAN 70 milligrams/deciliter    Pertinent Labs: 03-05 @ 05:41: Na 145, BUN 55<H>, Cr 0.45<L>, <H>, K+ 4.0, Phos --, Mg --, Alk Phos --, ALT/SGPT --, AST/SGOT --, HbA1c --    Finger Sticks:  POCT Blood Glucose.: 150 mg/dL (03-05 @ 06:43)  POCT Blood Glucose.: 116 mg/dL (03-05 @ 00:16)  POCT Blood Glucose.: 133 mg/dL (03-04 @ 19:06)  POCT Blood Glucose.: 188 mg/dL (03-04 @ 11:42)      Skin per nursing documentation: unstageable right hip , unstageable sacral spine, stage 2 sacral spine   Edema: + 1 generalized, +3 right left foot     Estimated Needs:   [ ] no change since previous assessment  [ x] recalculated: Protein (1.8-2gm/kg) 64 gm-71 gm     Previous Nutrition Diagnosis: Malnutrition (severe)  Nutrition Diagnosis is: only going, being addressed with enteral nutrition    New Nutrition Diagnosis: N/A       Interventions:   Recommend  1) Recommend change to bolus feeds of Glucerna. Recommend Glucerna 1.2 240 ml bolus feeds every 6 hours, provides total volume of 960 ml, 1152 calories( 32 kcal/Kg)  and 57.6 g of protein (1.6 gm protein) and 773 ml free water. Based on dosing wt of 35.8 kg  2) Recommend Pro-Source 2 x daily (provides additional 60 calories and 15 gm of protein per serving)   3) Continue Danactive BID per unit protocol       Monitoring and Evaluation:     Continue to monitor Nutritional intake, Tolerance to diet prescription, weights, labs, skin integrity    RD remains available upon request and will follow up per protocol  Shasha Hare RD, CDN, Pager # [phone]

## 2019-03-06 LAB
ANION GAP SERPL CALC-SCNC: 12 MMOL/L — SIGNIFICANT CHANGE UP (ref 5–17)
APPEARANCE UR: CLEAR — SIGNIFICANT CHANGE UP
BILIRUB UR-MCNC: NEGATIVE — SIGNIFICANT CHANGE UP
BUN SERPL-MCNC: 43 MG/DL — HIGH (ref 7–23)
CALCIUM SERPL-MCNC: 8.9 MG/DL — SIGNIFICANT CHANGE UP (ref 8.4–10.5)
CHLORIDE SERPL-SCNC: 105 MMOL/L — SIGNIFICANT CHANGE UP (ref 96–108)
CO2 SERPL-SCNC: 24 MMOL/L — SIGNIFICANT CHANGE UP (ref 22–31)
COLOR SPEC: SIGNIFICANT CHANGE UP
CREAT SERPL-MCNC: 0.39 MG/DL — LOW (ref 0.5–1.3)
CULTURE RESULTS: SIGNIFICANT CHANGE UP
CULTURE RESULTS: SIGNIFICANT CHANGE UP
DIFF PNL FLD: NEGATIVE — SIGNIFICANT CHANGE UP
GLUCOSE BLDC GLUCOMTR-MCNC: 104 MG/DL — HIGH (ref 70–99)
GLUCOSE BLDC GLUCOMTR-MCNC: 109 MG/DL — HIGH (ref 70–99)
GLUCOSE BLDC GLUCOMTR-MCNC: 136 MG/DL — HIGH (ref 70–99)
GLUCOSE BLDC GLUCOMTR-MCNC: 149 MG/DL — HIGH (ref 70–99)
GLUCOSE BLDC GLUCOMTR-MCNC: 185 MG/DL — HIGH (ref 70–99)
GLUCOSE SERPL-MCNC: 107 MG/DL — HIGH (ref 70–99)
GLUCOSE UR QL: NEGATIVE — SIGNIFICANT CHANGE UP
HCT VFR BLD CALC: 29.8 % — LOW (ref 34.5–45)
HGB BLD-MCNC: 8.8 G/DL — LOW (ref 11.5–15.5)
KETONES UR-MCNC: NEGATIVE — SIGNIFICANT CHANGE UP
LEUKOCYTE ESTERASE UR-ACNC: NEGATIVE — SIGNIFICANT CHANGE UP
MCHC RBC-ENTMCNC: 28.5 PG — SIGNIFICANT CHANGE UP (ref 27–34)
MCHC RBC-ENTMCNC: 29.5 GM/DL — LOW (ref 32–36)
MCV RBC AUTO: 96.4 FL — SIGNIFICANT CHANGE UP (ref 80–100)
NITRITE UR-MCNC: NEGATIVE — SIGNIFICANT CHANGE UP
PH UR: 6 — SIGNIFICANT CHANGE UP (ref 5–8)
PLATELET # BLD AUTO: 451 K/UL — HIGH (ref 150–400)
POTASSIUM SERPL-MCNC: 4.2 MMOL/L — SIGNIFICANT CHANGE UP (ref 3.5–5.3)
POTASSIUM SERPL-SCNC: 4.2 MMOL/L — SIGNIFICANT CHANGE UP (ref 3.5–5.3)
PROT UR-MCNC: ABNORMAL
RBC # BLD: 3.09 M/UL — LOW (ref 3.8–5.2)
RBC # FLD: 13.5 % — SIGNIFICANT CHANGE UP (ref 10.3–14.5)
SODIUM SERPL-SCNC: 141 MMOL/L — SIGNIFICANT CHANGE UP (ref 135–145)
SP GR SPEC: 1.02 — SIGNIFICANT CHANGE UP (ref 1.01–1.02)
SPECIMEN SOURCE: SIGNIFICANT CHANGE UP
SPECIMEN SOURCE: SIGNIFICANT CHANGE UP
UROBILINOGEN FLD QL: SIGNIFICANT CHANGE UP
WBC # BLD: 13.87 K/UL — HIGH (ref 3.8–10.5)
WBC # FLD AUTO: 13.87 K/UL — HIGH (ref 3.8–10.5)

## 2019-03-06 PROCEDURE — 99233 SBSQ HOSP IP/OBS HIGH 50: CPT

## 2019-03-06 PROCEDURE — 71045 X-RAY EXAM CHEST 1 VIEW: CPT | Mod: 26

## 2019-03-06 RX ORDER — IPRATROPIUM/ALBUTEROL SULFATE 18-103MCG
3 AEROSOL WITH ADAPTER (GRAM) INHALATION ONCE
Qty: 0 | Refills: 0 | Status: COMPLETED | OUTPATIENT
Start: 2019-03-06 | End: 2019-03-06

## 2019-03-06 RX ADMIN — APIXABAN 5 MILLIGRAM(S): 2.5 TABLET, FILM COATED ORAL at 20:58

## 2019-03-06 RX ADMIN — Medication 1 DROP(S): at 18:01

## 2019-03-06 RX ADMIN — Medication 1: at 12:00

## 2019-03-06 RX ADMIN — Medication 300 MILLIGRAM(S): at 11:10

## 2019-03-06 RX ADMIN — GABAPENTIN 300 MILLIGRAM(S): 400 CAPSULE ORAL at 11:40

## 2019-03-06 RX ADMIN — Medication 1 DROP(S): at 11:09

## 2019-03-06 RX ADMIN — QUETIAPINE FUMARATE 25 MILLIGRAM(S): 200 TABLET, FILM COATED ORAL at 22:24

## 2019-03-06 RX ADMIN — FAMOTIDINE 20 MILLIGRAM(S): 10 INJECTION INTRAVENOUS at 11:10

## 2019-03-06 RX ADMIN — Medication 1 APPLICATION(S): at 06:20

## 2019-03-06 RX ADMIN — MICAFUNGIN SODIUM 105 MILLIGRAM(S): 100 INJECTION, POWDER, LYOPHILIZED, FOR SOLUTION INTRAVENOUS at 11:40

## 2019-03-06 RX ADMIN — Medication 1.5 MILLIGRAM(S): at 22:24

## 2019-03-06 RX ADMIN — DAPTOMYCIN 109.6 MILLIGRAM(S): 500 INJECTION, POWDER, LYOPHILIZED, FOR SOLUTION INTRAVENOUS at 17:57

## 2019-03-06 RX ADMIN — Medication 7.5 MILLIGRAM(S): at 06:22

## 2019-03-06 RX ADMIN — APIXABAN 5 MILLIGRAM(S): 2.5 TABLET, FILM COATED ORAL at 11:10

## 2019-03-06 RX ADMIN — Medication 1 DROP(S): at 23:56

## 2019-03-06 RX ADMIN — Medication 3 MILLILITER(S): at 21:56

## 2019-03-06 RX ADMIN — MEROPENEM 100 MILLIGRAM(S): 1 INJECTION INTRAVENOUS at 06:22

## 2019-03-06 RX ADMIN — Medication 650 MILLIGRAM(S): at 19:01

## 2019-03-06 RX ADMIN — Medication 1 APPLICATION(S): at 17:15

## 2019-03-06 RX ADMIN — GABAPENTIN 300 MILLIGRAM(S): 400 CAPSULE ORAL at 22:24

## 2019-03-06 NOTE — PHYSICAL THERAPY INITIAL EVALUATION ADULT - PERTINENT HX OF CURRENT PROBLEM, REHAB EVAL
67 y/o F-pt with a history of severely impaired functional and cognitive impairment in the setting of advanced dementia (nonverbal, noncommunicative, chronic gastrostomy tube, bed confined with functional quadriplegia, un stage able sacral and RIGHT hip decubiti, heel ulcers, presumed asthma on a recent tapering solumedrol but is normally apparently steroid dependent on 7.5 mg Prednisone daily. pt adm for fevers at home x 2 days; cont below:

## 2019-03-06 NOTE — PHYSICAL THERAPY INITIAL EVALUATION ADULT - ADDITIONAL COMMENTS
pt lives with  in private house, pt dependent with all mobility, pablo lift for transfers, nonambulatory. pt has HHA; and was receiving Home PT/OT prior to hospitalization

## 2019-03-06 NOTE — PROGRESS NOTE ADULT - SUBJECTIVE AND OBJECTIVE BOX
CC: f/u for fever    Patient reports; she has been afebrile, eyes are open, minimaly interactive    REVIEW OF SYSTEMS:  All other review of systems negative (Comprehensive ROS)    Antimicrobials Day #  :  DAPTOmycin IVPB 240 milliGRAM(s) IV Intermittent every 24 hours day 4  meropenem  IVPB 1000 milliGRAM(s) IV Intermittent every 12 hours day 8  micafungin IVPB 100 milliGRAM(s) IV Intermittent every 24 hours  micafungin IVPB    day 4    Other Medications Reviewed    T(F): 98.3 (03-06-19 @ 11:28), Max: 98.7 (03-06-19 @ 00:30)  HR: 112 (03-06-19 @ 14:25)  BP: 129/76 (03-06-19 @ 14:25)  RR: 17 (03-06-19 @ 11:28)  SpO2: 99% (03-06-19 @ 11:28)  Wt(kg): --    PHYSICAL EXAM:  General: alert, no acute distress  Eyes:  anicteric, no conjunctival injection, no discharge  Oropharynx: no lesions or injection 	  Neck: supple, without adenopathy  Lungs: clear to auscultation  Heart: regular rate and rhythm; no murmur, rubs or gallops  Abdomen: soft, nondistended, nontender, peg  Skin: no lesions  Extremities: no clubbing, cyanosis, or edema  Neurologic: alert,minimally interactive    LAB RESULTS:                        8.8    13.87 )-----------( 451      ( 06 Mar 2019 10:11 )             29.8     03-06    141  |  105  |  43<H>  ----------------------------<  107<H>  4.2   |  24  |  0.39<L>    Ca    8.9      06 Mar 2019 06:34          MICROBIOLOGY:  RECENT CULTURES:  03-03 @ 17:14 .Blood Blood-Peripheral     No growth to date.      03-03 @ 11:53 .Blood Blood-Peripheral     No growth to date.          RADIOLOGY REVIEWED:

## 2019-03-06 NOTE — PROGRESS NOTE ADULT - ASSESSMENT
69 yo f with multiple medical conditions admitted 2/26 with fever to 103.  She has spent the greater past of past 9 months in the hospital.  She had difficult to control MRSA bacteremia, finally controlled after RT hip was debrided and explanted.  s/p multiple treatment for pneumonia, stage 4 decubitus ,peg, and chronic gavin.  No obvious primary site for infection .urine culture with enterobacter and VRE, likely colonizers  She also has a chronic DVT and had unexplained fevers x 6 weeks during last hospital stay  1/4 bottles with a coag negative staph, likely a contaminant. CDT is negative  CT: patchy b/l groundglass opacities, no new consolidations, no acute abd pathology.  Her chest CT scan is unchanged c/w 2016, hence no evidence of an acute or subacute infection.  Rt hip is without collection and sacral decubitus is chronic.  We may be confronted with approaching her as a FUO as she did not respond to meropeenem, tigecycline, and is now on empiric dapto and micafungin.  Drug fever has been a concern in past.  S/P repeat cultures past few days, no new positives  Head CT is without acute process  Her fever and leukocytosis are moderating, ? in response to antibiotics  Suggest::  stop meropenem, day 8   Daptomycin, Amikacin and micafungin, all empiric, perhaps 5  days of dapto and micafungin. This will be one more day  s/p a dose of Zyvox 3/3  d/w pt daughterd at bedside  prognosis seems extremely poor   requests PT,OT, and speech therapy  imaging of sacrum not likely to alter ID approach to management  osteo if present would be managed with local wound care

## 2019-03-06 NOTE — PROGRESS NOTE ADULT - SUBJECTIVE AND OBJECTIVE BOX
HPI:  Patient seen and examined at bedside on 4 Ravinder.  No events overnight.  NSR - sinus tachycardia seen on tele.    Review Of Systems:           Respiratory: No shortness of breath, cough, or wheezing  Cardiovascular: No chest pain or palpitations  10 point review of systems is otherwise negative except as mentioned above        Medications:  acetaminophen    Suspension .. 650 milliGRAM(s) Oral every 6 hours PRN  ALBUTerol    90 MICROgram(s) HFA Inhaler 1 Puff(s) Inhalation every 4 hours  apixaban 5 milliGRAM(s) Oral every 12 hours  artificial  tears Solution 1 Drop(s) Both EYES every 6 hours  clonazePAM Tablet 1.5 milliGRAM(s) Oral at bedtime  Dakins Solution - 1/4 Strength 1 Application(s) Topical two times a day  DAPTOmycin IVPB 240 milliGRAM(s) IV Intermittent every 24 hours  dextrose 40% Gel 15 Gram(s) Oral once PRN  dextrose 5%. 1000 milliLiter(s) IV Continuous <Continuous>  dextrose 50% Injectable 12.5 Gram(s) IV Push once  dextrose 50% Injectable 25 Gram(s) IV Push once  dextrose 50% Injectable 25 Gram(s) IV Push once  famotidine    Tablet 20 milliGRAM(s) Oral daily  ferrous    sulfate Liquid 300 milliGRAM(s) Oral daily  gabapentin   Solution 300 milliGRAM(s) Oral two times a day  glucagon  Injectable 1 milliGRAM(s) IntraMuscular once PRN  insulin lispro (HumaLOG) corrective regimen sliding scale   SubCutaneous every 6 hours  Medical Marijuana Oil 1 milliLiter(s),Medical Marijuana Oil 1 milliLiter(s),Medical Marijuana Oil 1 milliLiter(s) 1 milliLiter(s) Oral <User Schedule>  micafungin IVPB 100 milliGRAM(s) IV Intermittent every 24 hours  micafungin IVPB      midodrine 10 milliGRAM(s) Oral three times a day  predniSONE   Tablet 7.5 milliGRAM(s) Oral daily  QUEtiapine 25 milliGRAM(s) Oral at bedtime  tiotropium 18 MICROgram(s) Capsule 1 Capsule(s) Inhalation daily    PAST MEDICAL & SURGICAL HISTORY:  CVA (cerebral vascular accident)  Fatty pancreas  PNA (pneumonia)  Pulmonary HTN  IGT (impaired glucose tolerance)  Ulcerative colitis  Acid reflux  Anxiety  Depression  Mouth sores  HLD (hyperlipidemia)  Asthma  Humeral head fracture  H/O: hysterectomy  H/O cataract extraction, left  History of knee replacement    Vitals:  T(C): 38.5 (03-06-19 @ 19:00), Max: 38.5 (19 @ 19:00)  HR: 113 (19 @ 18:46) (99 - 113)  BP: 127/71 (19 @ 18:46) (101/66 - 159/80)  BP(mean): --  RR: 20 (19 @ 18:46) (17 - 20)  SpO2: 96% (19 @ 18:46) (96% - 99%)  Wt(kg): --  Daily     Daily Weight in k.6 (06 Mar 2019 07:13)  I&O's Summary    05 Mar 2019 07:  -  06 Mar 2019 07:00  --------------------------------------------------------  IN: 930 mL / OUT: 1800 mL / NET: -870 mL    06 Mar 2019 07:  -  06 Mar 2019 19:35  --------------------------------------------------------  IN: 240 mL / OUT: 400 mL / NET: -160 mL        Physical Exam:  Appearance: functional quadriplegia   Eyes: PERRLA, EOMI, pink conjunctiva, no scleral icterus   HENT: Normal oral mucosa  Cardiovascular: RRR, S1, S2, no murmur, rub, or gallop; + edema in hands and feet; +JVD  Procedural Access Site: Clean, dry, intact, without hematoma  Respiratory: bilateral air entry; poor inspiratory effort  Gastrointestinal: Soft, non-tender, non-distended, BS+, +PEG  Musculoskeletal: +contracted  Neurologic: functional quadriplegia  Lymphatic: No lymphadenopathy  Psychiatry: advanced dementia  Skin: No rashes, ecchymoses, or cyanosis on extremities                          8.8    13.87 )-----------( 451      ( 06 Mar 2019 10:11 )             29.8     03-06    141  |  105  |  43<H>  ----------------------------<  107<H>  4.2   |  24  |  0.39<L>    Ca    8.9      06 Mar 2019 06:34        Cardiovascular Diagnostic Testing:    Echo: < from: Transthoracic Echocardiogram (18 @ 11:23) >  EF (Teicholtz): 26 %  Doppler Peak Velocity (m/sec): AoV=0.9  ------------------------------------------------------------------------  Observations:  Mitral Valve: Mitral annular calcification, otherwise  normal mitral valve. Moderate-severe mitral regurgitation  (vena contracta 0.8 cm)  Aortic Valve/Aorta: Calcified trileaflet aortic valve with  normal opening. Peak transaortic valve gradient equals 3 mm  Hg, mean transaortic valve gradient equals 1 mm Hg, aortic  valve velocity time integral equals 13 cm. Moderate aortic  regurgitation  Aortic Root: 2.9 cm.  Left Atrium: Moderately dilated left atrium.  LA volume  index = 45 cc/m2.  Left Ventricle: Severe global left ventricular systolic  dysfunction. Mild left ventricular enlargement. Increased  E/e'  is consistent with elevated left ventricular filling  pressure.  Right Heart: Moderate right atrial enlargement. Inferior  vena cava is dilated (>=2.5cm) with normal respiratory  variability consistent with right atrial pressure 16-20mm  Hg. Right ventricular enlargement with decreased right  ventricular systolic function. Normal tricuspid valve.  Severe tricuspid regurgitation. Pulmonic valve not well  visualized.  Pericardium/Pleura: Normal pericardium with no pericardial  effusion.  Hemodynamic: Estimated right atrial pressure is 8 mm Hg.  Estimated right ventricular systolic pressure equals 82 mm  Hg, assuming right atrial pressure equals 8 mm Hg,  consistent with severe pulmonary hypertension. Color  Doppler demonstrates evidence of left to right shunt  ------------------------------------------------------------------------  Conclusions:  1. Mitral annular calcification, otherwise normal mitral  valve. Moderate-severe mitral regurgitation (vena contracta  0.8 cm)  2. Calcified trileafletaortic valve with normal opening.  Peak transaortic valve gradient equals 3 mm Hg, mean  transaortic valve gradient equals 1 mm Hg, aortic valve  velocity time integral equals 13 cm. Moderate aortic  regurgitation  3. Moderately dilated left atrium.LA volume index = 45  cc/m2.  4. Severe global left ventricular systolic dysfunction.  5. Increased E/e'  is consistent with elevated left  ventricular filling pressure.  6. Moderate right atrial enlargement. Inferior vena cava is  dilated (>=2.5cm) with normal respiratory variability  consistent with right atrial pressure 16-20mm Hg.  7. Right ventricular enlargement with decreased right  ventricular systolic function.  8. Normal tricuspid valve. Severe tricuspid regurgitation.  9. Estimated pulmonary artery systolic pressure equals 82  mm Hg, assuming right atrial pressure equals 8 mm Hg,  consistent with severe pulmonary pressures.  10. Color Doppler demonstrates evidence of left to right  shunt  ------------------------------------------------------------------------  Confirmed on  12/3/2018 - 16:37:07 by Margaret Khan M.D.  ------------------------------------------------------------------------    < end of copied text >    Interpretation of Telemetry: NSR 80 - 90 bpm

## 2019-03-06 NOTE — PROGRESS NOTE ADULT - ASSESSMENT
68 year-old woman with advanced dementia and functional quadriplegia presents with fevers of unknown origin.  Leukocytosis seen improving after broadening if antibiotic regimen although source remains unclear.    Patient is breathing comfortably and does not appear to have decompensated heart failure at this time.  She is making adequate urine and has normal renal function.  Maintaining normal sinus rhythm on telemetry.  Continue apixaban 5 mg BID for patient with age < 80 and creatinine < 1.5 mg/dL in patient with history of DVTs.  For patient with known cardiomyopathy, recommend trying to titrate down midodrine. If BP permits, would recommend discontinuing midodrine over time and hoping to start patient on ACEi/ARB and beta-blocker.    Recommend allowing her to mobilize fluids without additional diuretics at this time.    Please reconsult with any active cardiovascular issues, although I will continue to see patient at her , Caleb's, request.

## 2019-03-06 NOTE — PROGRESS NOTE ADULT - SUBJECTIVE AND OBJECTIVE BOX
---___---___---___---___---___---___ ---___---___---___---___---___---___---___---___---___---                    <<<  M E D I C A L   A T T E N D I N G    F O L L O W    U P   N O T E  >>>    improving having difficulty speaking   cbc trending down      ---___---___---___---___---___---      <<<  MEDICATIONS:  >>>    MEDICATIONS  (STANDING):  ALBUTerol    90 MICROgram(s) HFA Inhaler 1 Puff(s) Inhalation every 4 hours  apixaban 5 milliGRAM(s) Oral every 12 hours  artificial  tears Solution 1 Drop(s) Both EYES every 6 hours  clonazePAM Tablet 1.5 milliGRAM(s) Oral at bedtime  Dakins Solution - 1/4 Strength 1 Application(s) Topical two times a day  DAPTOmycin IVPB 240 milliGRAM(s) IV Intermittent every 24 hours  dextrose 5%. 1000 milliLiter(s) (50 mL/Hr) IV Continuous <Continuous>  dextrose 50% Injectable 12.5 Gram(s) IV Push once  dextrose 50% Injectable 25 Gram(s) IV Push once  dextrose 50% Injectable 25 Gram(s) IV Push once  famotidine    Tablet 20 milliGRAM(s) Oral daily  ferrous    sulfate Liquid 300 milliGRAM(s) Oral daily  gabapentin   Solution 300 milliGRAM(s) Oral two times a day  insulin lispro (HumaLOG) corrective regimen sliding scale   SubCutaneous every 6 hours  Medical Marijuana Oil 1 milliLiter(s),Medical Marijuana Oil 1 milliLiter(s),Medical Marijuana Oil 1 milliLiter(s) 1 milliLiter(s) Oral <User Schedule>  micafungin IVPB 100 milliGRAM(s) IV Intermittent every 24 hours  micafungin IVPB      midodrine 10 milliGRAM(s) Oral three times a day  predniSONE   Tablet 7.5 milliGRAM(s) Oral daily  QUEtiapine 25 milliGRAM(s) Oral at bedtime  tiotropium 18 MICROgram(s) Capsule 1 Capsule(s) Inhalation daily      MEDICATIONS  (PRN):  acetaminophen    Suspension .. 650 milliGRAM(s) Oral every 6 hours PRN Temp greater or equal to 38C (100.4F)  dextrose 40% Gel 15 Gram(s) Oral once PRN Blood Glucose LESS THAN 70 milliGRAM(s)/deciliter  glucagon  Injectable 1 milliGRAM(s) IntraMuscular once PRN Glucose LESS THAN 70 milligrams/deciliter       ---___---___---___---___---___---     <<<REVIEW OF SYSTEM: >>>    unable to obtain    ---___---___---___---___---___---          <<<  VITAL SIGNS: >>>    T(F): 98.3 (03-06-19 @ 11:28), Max: 98.7 (03-06-19 @ 00:30)  HR: 112 (03-06-19 @ 14:25) (99 - 112)  BP: 129/76 (03-06-19 @ 14:25) (101/66 - 159/80)  RR: 17 (03-06-19 @ 11:28) (17 - 18)  SpO2: 99% (03-06-19 @ 11:28) (96% - 99%)  Wt(kg): --  CAPILLARY BLOOD GLUCOSE      POCT Blood Glucose.: 185 mg/dL (06 Mar 2019 11:48)    I&O's Summary    05 Mar 2019 07:01  -  06 Mar 2019 07:00  --------------------------------------------------------  IN: 930 mL / OUT: 1800 mL / NET: -870 mL    06 Mar 2019 07:01  -  06 Mar 2019 17:54  --------------------------------------------------------  IN: 240 mL / OUT: 400 mL / NET: -160 mL         ---___---___---___---___---___---                       PHYSICAL EXAM:    GEN: A&O X 0 , NAD , comfortable  HEENT: NCAT, PERRL, MMM, no scleral icterus, hearing intact  NECK: Supple, No JVD  CVS: S1S2 , regular , No M/R/G appreciated  PULM: CTA B/L,  no W/R/R appreciated  ABD.: soft. non tender, non distended,  bowel sounds present peg noted   Extrem: intact pulses , no edema noted  Derm: No rash or ecchymosis noted        ---___---___---___---___---___---     <<<  LAB AND IMAGING: >>>                          8.8    13.87 )-----------( 451      ( 06 Mar 2019 10:11 )             29.8               03-06    141  |  105  |  43<H>  ----------------------------<  107<H>  4.2   |  24  |  0.39<L>    Ca    8.9      06 Mar 2019 06:34                                 [All pertinent / recent available Imaging reports and other labs reviewed]     ---___---___---___---___---___---___ ---___---___---___---___---           <<<  A S S E S S M E N T   A N D   P L A N :  >>>    sepsis resolving  id following   chronic pain on medical marijuana  chf restart lisinopril before dc home and dc midodrine prior to dc   asthma ciontineu meds  agitation on klonopin and seroquel   elevated blood sugar on sliding scale           -GI/DVT Prophylaxis.    --------------------------------------------  Case discussed with   Education given on     >>______________________<<      Deniz Bolden .         phone   [phone]

## 2019-03-06 NOTE — PHYSICAL THERAPY INITIAL EVALUATION ADULT - PASSIVE RANGE OF MOTION EXAMINATION, REHAB EVAL
bilateral upper extremity Passive ROM was WFL (within functional limits)/B hip flex. contracture, B knee flex contracture

## 2019-03-07 LAB
ANION GAP SERPL CALC-SCNC: 16 MMOL/L — SIGNIFICANT CHANGE UP (ref 5–17)
BASE EXCESS BLDA CALC-SCNC: 3.3 MMOL/L — HIGH (ref -2–2)
BUN SERPL-MCNC: 42 MG/DL — HIGH (ref 7–23)
CALCIUM SERPL-MCNC: 9.3 MG/DL — SIGNIFICANT CHANGE UP (ref 8.4–10.5)
CHLORIDE SERPL-SCNC: 100 MMOL/L — SIGNIFICANT CHANGE UP (ref 96–108)
CO2 BLDA-SCNC: 28 MMOL/L — SIGNIFICANT CHANGE UP (ref 22–30)
CO2 SERPL-SCNC: 23 MMOL/L — SIGNIFICANT CHANGE UP (ref 22–31)
CREAT SERPL-MCNC: 0.43 MG/DL — LOW (ref 0.5–1.3)
GAS PNL BLDA: SIGNIFICANT CHANGE UP
GLUCOSE BLDC GLUCOMTR-MCNC: 126 MG/DL — HIGH (ref 70–99)
GLUCOSE BLDC GLUCOMTR-MCNC: 147 MG/DL — HIGH (ref 70–99)
GLUCOSE BLDC GLUCOMTR-MCNC: 247 MG/DL — HIGH (ref 70–99)
GLUCOSE SERPL-MCNC: 134 MG/DL — HIGH (ref 70–99)
HCO3 BLDA-SCNC: 26 MMOL/L — SIGNIFICANT CHANGE UP (ref 21–29)
HCT VFR BLD CALC: 34 % — LOW (ref 34.5–45)
HGB BLD-MCNC: 10.2 G/DL — LOW (ref 11.5–15.5)
HOROWITZ INDEX BLDA+IHG-RTO: 21 — SIGNIFICANT CHANGE UP
MCHC RBC-ENTMCNC: 29.1 PG — SIGNIFICANT CHANGE UP (ref 27–34)
MCHC RBC-ENTMCNC: 30 GM/DL — LOW (ref 32–36)
MCV RBC AUTO: 96.9 FL — SIGNIFICANT CHANGE UP (ref 80–100)
PCO2 BLDA: 36 MMHG — SIGNIFICANT CHANGE UP (ref 32–46)
PH BLDA: 7.48 — HIGH (ref 7.35–7.45)
PLATELET # BLD AUTO: 562 K/UL — HIGH (ref 150–400)
PO2 BLDA: 76 MMHG — SIGNIFICANT CHANGE UP (ref 74–108)
POTASSIUM SERPL-MCNC: 4.6 MMOL/L — SIGNIFICANT CHANGE UP (ref 3.5–5.3)
POTASSIUM SERPL-SCNC: 4.6 MMOL/L — SIGNIFICANT CHANGE UP (ref 3.5–5.3)
RAPID RVP RESULT: SIGNIFICANT CHANGE UP
RBC # BLD: 3.51 M/UL — LOW (ref 3.8–5.2)
RBC # FLD: 13.6 % — SIGNIFICANT CHANGE UP (ref 10.3–14.5)
SAO2 % BLDA: 97 % — HIGH (ref 92–96)
SODIUM SERPL-SCNC: 139 MMOL/L — SIGNIFICANT CHANGE UP (ref 135–145)
WBC # BLD: 14.99 K/UL — HIGH (ref 3.8–10.5)
WBC # FLD AUTO: 14.99 K/UL — HIGH (ref 3.8–10.5)

## 2019-03-07 PROCEDURE — 99223 1ST HOSP IP/OBS HIGH 75: CPT

## 2019-03-07 PROCEDURE — 99233 SBSQ HOSP IP/OBS HIGH 50: CPT

## 2019-03-07 RX ORDER — SALIVA SUBSTITUTE COMB NO.11 351 MG
5 POWDER IN PACKET (EA) MUCOUS MEMBRANE
Qty: 0 | Refills: 0 | Status: DISCONTINUED | OUTPATIENT
Start: 2019-03-07 | End: 2019-03-18

## 2019-03-07 RX ORDER — ACETAMINOPHEN 500 MG
1000 TABLET ORAL ONCE
Qty: 0 | Refills: 0 | Status: DISCONTINUED | OUTPATIENT
Start: 2019-03-07 | End: 2019-03-07

## 2019-03-07 RX ORDER — LISINOPRIL 2.5 MG/1
2.5 TABLET ORAL DAILY
Qty: 0 | Refills: 0 | Status: DISCONTINUED | OUTPATIENT
Start: 2019-03-08 | End: 2019-03-18

## 2019-03-07 RX ORDER — ACETAMINOPHEN 500 MG
600 TABLET ORAL ONCE
Qty: 0 | Refills: 0 | Status: COMPLETED | OUTPATIENT
Start: 2019-03-07 | End: 2019-03-07

## 2019-03-07 RX ORDER — SODIUM CHLORIDE 0.65 %
1 AEROSOL, SPRAY (ML) NASAL
Qty: 0 | Refills: 0 | Status: DISCONTINUED | OUTPATIENT
Start: 2019-03-07 | End: 2019-03-18

## 2019-03-07 RX ORDER — CLONAZEPAM 1 MG
1.5 TABLET ORAL AT BEDTIME
Qty: 0 | Refills: 0 | Status: DISCONTINUED | OUTPATIENT
Start: 2019-03-07 | End: 2019-03-14

## 2019-03-07 RX ORDER — IPRATROPIUM/ALBUTEROL SULFATE 18-103MCG
3 AEROSOL WITH ADAPTER (GRAM) INHALATION ONCE
Qty: 0 | Refills: 0 | Status: COMPLETED | OUTPATIENT
Start: 2019-03-07 | End: 2019-03-07

## 2019-03-07 RX ADMIN — Medication 600 MILLIGRAM(S): at 18:53

## 2019-03-07 RX ADMIN — Medication 1 DROP(S): at 12:33

## 2019-03-07 RX ADMIN — Medication 300 MILLIGRAM(S): at 12:34

## 2019-03-07 RX ADMIN — APIXABAN 5 MILLIGRAM(S): 2.5 TABLET, FILM COATED ORAL at 21:42

## 2019-03-07 RX ADMIN — QUETIAPINE FUMARATE 25 MILLIGRAM(S): 200 TABLET, FILM COATED ORAL at 21:42

## 2019-03-07 RX ADMIN — Medication 1.5 MILLIGRAM(S): at 21:42

## 2019-03-07 RX ADMIN — Medication 3 MILLILITER(S): at 08:47

## 2019-03-07 RX ADMIN — DAPTOMYCIN 109.6 MILLIGRAM(S): 500 INJECTION, POWDER, LYOPHILIZED, FOR SOLUTION INTRAVENOUS at 17:51

## 2019-03-07 RX ADMIN — FAMOTIDINE 20 MILLIGRAM(S): 10 INJECTION INTRAVENOUS at 12:34

## 2019-03-07 RX ADMIN — Medication 1 DROP(S): at 17:51

## 2019-03-07 RX ADMIN — GABAPENTIN 300 MILLIGRAM(S): 400 CAPSULE ORAL at 21:42

## 2019-03-07 RX ADMIN — Medication 7.5 MILLIGRAM(S): at 06:40

## 2019-03-07 RX ADMIN — MICAFUNGIN SODIUM 105 MILLIGRAM(S): 100 INJECTION, POWDER, LYOPHILIZED, FOR SOLUTION INTRAVENOUS at 12:34

## 2019-03-07 RX ADMIN — GABAPENTIN 300 MILLIGRAM(S): 400 CAPSULE ORAL at 12:34

## 2019-03-07 RX ADMIN — Medication 2: at 12:33

## 2019-03-07 RX ADMIN — APIXABAN 5 MILLIGRAM(S): 2.5 TABLET, FILM COATED ORAL at 12:34

## 2019-03-07 RX ADMIN — Medication 5 MILLILITER(S): at 17:51

## 2019-03-07 RX ADMIN — Medication 1 APPLICATION(S): at 17:29

## 2019-03-07 RX ADMIN — MIDODRINE HYDROCHLORIDE 10 MILLIGRAM(S): 2.5 TABLET ORAL at 06:40

## 2019-03-07 RX ADMIN — Medication 240 MILLIGRAM(S): at 10:28

## 2019-03-07 RX ADMIN — Medication 1 APPLICATION(S): at 06:40

## 2019-03-07 NOTE — PROGRESS NOTE ADULT - ASSESSMENT
68 year-old woman with advanced dementia and functional quadriplegia presents with fevers of unknown origin.  Leukocytosis seen slightly rising today, although source remains unclear.    Patient's breathing has changed, but she does not appear to have decompensated heart failure at this time.  She is making adequate urine and has normal renal function.  Maintaining normal sinus rhythm on telemetry.  Continue apixaban 5 mg BID for patient with age < 80 and creatinine < 1.5 mg/dL in patient with history of DVTs.  For patient with known cardiomyopathy, recommend trying to titrate down midodrine. If BP permits, would recommend discontinuing midodrine over time and hoping to start patient on ACEi/ARB and beta-blocker.    No objection to low dose diuretics at this time.    Please reconsult with any active cardiovascular issues, although I will continue to see patient at her , Caleb's, request.

## 2019-03-07 NOTE — PROGRESS NOTE ADULT - SUBJECTIVE AND OBJECTIVE BOX
---___---___---___---___---___---___ ---___---___---___---___---___---___---___---___---___---                    <<<  M E D I C A L   A T T E N D I N G    F O L L O W    U P   N O T E  >>>    - Patient seen  today and now with fevere and white count elevated      ---___---___---___---___---___---      <<<  MEDICATIONS:  >>>    MEDICATIONS  (STANDING):  ALBUTerol    90 MICROgram(s) HFA Inhaler 1 Puff(s) Inhalation every 4 hours  apixaban 5 milliGRAM(s) Oral every 12 hours  artificial  tears Solution 1 Drop(s) Both EYES every 6 hours  Biotene Dry Mouth Oral Rinse 5 milliLiter(s) Swish and Spit two times a day  clonazePAM Tablet 1.5 milliGRAM(s) Oral at bedtime  Dakins Solution - 1/4 Strength 1 Application(s) Topical two times a day  DAPTOmycin IVPB 240 milliGRAM(s) IV Intermittent every 24 hours  dextrose 5%. 1000 milliLiter(s) (50 mL/Hr) IV Continuous <Continuous>  dextrose 50% Injectable 12.5 Gram(s) IV Push once  dextrose 50% Injectable 25 Gram(s) IV Push once  dextrose 50% Injectable 25 Gram(s) IV Push once  famotidine    Tablet 20 milliGRAM(s) Oral daily  ferrous    sulfate Liquid 300 milliGRAM(s) Oral daily  gabapentin   Solution 300 milliGRAM(s) Oral two times a day  insulin lispro (HumaLOG) corrective regimen sliding scale   SubCutaneous every 6 hours  Medical Marijuana Oil 1 milliLiter(s),Medical Marijuana Oil 1 milliLiter(s),Medical Marijuana Oil 1 milliLiter(s) 1 milliLiter(s) Oral <User Schedule>  micafungin IVPB 100 milliGRAM(s) IV Intermittent every 24 hours  micafungin IVPB      predniSONE   Tablet 7.5 milliGRAM(s) Oral daily  QUEtiapine 25 milliGRAM(s) Oral at bedtime  tiotropium 18 MICROgram(s) Capsule 1 Capsule(s) Inhalation daily      MEDICATIONS  (PRN):  acetaminophen    Suspension .. 650 milliGRAM(s) Oral every 6 hours PRN Temp greater or equal to 38C (100.4F)  dextrose 40% Gel 15 Gram(s) Oral once PRN Blood Glucose LESS THAN 70 milliGRAM(s)/deciliter  glucagon  Injectable 1 milliGRAM(s) IntraMuscular once PRN Glucose LESS THAN 70 milligrams/deciliter  sodium chloride 0.65% Nasal 1 Spray(s) Both Nostrils two times a day PRN Nasal Congestion       ---___---___---___---___---___---     <<<REVIEW OF SYSTEM: >>>    unable to obtain   ---___---___---___---___---___---          <<<  VITAL SIGNS: >>>    T(F): 98.4 (19 @ 11:35), Max: 101.3 (19 @ 19:00)  HR: 104 (19 @ 11:35) (89 - 114)  BP: 123/74 (19 @ 11:35) (104/63 - 156/93)  RR: 18 (19 @ 11:35) (18 - 30)  SpO2: 97% (19 @ 11:35) (96% - 97%)  Wt(kg): --  CAPILLARY BLOOD GLUCOSE      POCT Blood Glucose.: 247 mg/dL (07 Mar 2019 11:38)    I&O's Summary    06 Mar 2019 07:01  -  07 Mar 2019 07:00  --------------------------------------------------------  IN: 240 mL / OUT: 800 mL / NET: -560 mL         ---___---___---___---___---___---                       PHYSICAL EXAM:    GEN: A&O X 0 , NAD , comfortable  HEENT: NCAT, PERRL, MMM, no scleral icterus, hearing intact   NECK: Supple, No JVD  CVS: S1S2 , regular , No M/R/G appreciated  PULM: CTA B/L,  no W/R/R appreciated  ABD.: soft. non tender, non distended,  bowel sounds present peg noted  Extrem: intact pulses , no edema noted  Derm: No rash or ecchymosis noted sacral decubitus noted        ---___---___---___---___---___---     <<<  LAB AND IMAGING: >>>                          10.2   14.99 )-----------( 562      ( 07 Mar 2019 08:33 )             34.0               03-07    139  |  100  |  42<H>  ----------------------------<  134<H>  4.6   |  23  |  0.43<L>    Ca    9.3      07 Mar 2019 06:16             Urinalysis Basic - ( 06 Mar 2019 23:01 )    Color: Light Yellow / Appearance: Clear / S.019 / pH: x  Gluc: x / Ketone: Negative  / Bili: Negative / Urobili: <2 mg/dL   Blood: x / Protein: 30 mg/dL / Nitrite: Negative   Leuk Esterase: Negative / RBC: 8 /HPF / WBC 1 /HPF   Sq Epi: x / Non Sq Epi: 1 /HPF / Bacteria: Negative                        [All pertinent / recent available Imaging reports and other labs reviewed]     ---___---___---___---___---___---___ ---___---___---___---___---           <<<  A S S E S S M E N T   A N D   P L A N :  >>>  fever   sepsis  id consult apprecieted awaiting input  reculturees and abg  chf restartt low dose lisinopril   dementia agitation  continue current medication          -GI/DVT Prophylaxis.    --------------------------------------------  Case discussed with   Education given on     >>______________________<<      Deniz Bolden .         phone   [phone]

## 2019-03-07 NOTE — PROGRESS NOTE ADULT - SUBJECTIVE AND OBJECTIVE BOX
PULMONARY PROGRESS NOTE    MYRIAM HEATH  MRN-[id number]    Patient is a 68y old  Female who presents with a chief complaint of Fevers for the past 2 days at home (07 Mar 2019 10:03)      HPI:  - at bedside concerned about patients breathing pattern.  pt awake with eyes open but not interactive    ROS:   -unable to obtain    ACTIVE MEDICATION LIST:  MEDICATIONS  (STANDING):  ALBUTerol    90 MICROgram(s) HFA Inhaler 1 Puff(s) Inhalation every 4 hours  apixaban 5 milliGRAM(s) Oral every 12 hours  artificial  tears Solution 1 Drop(s) Both EYES every 6 hours  Biotene Dry Mouth Oral Rinse 5 milliLiter(s) Swish and Spit two times a day  clonazePAM Tablet 1.5 milliGRAM(s) Oral at bedtime  Dakins Solution - 1/4 Strength 1 Application(s) Topical two times a day  DAPTOmycin IVPB 240 milliGRAM(s) IV Intermittent every 24 hours  dextrose 5%. 1000 milliLiter(s) (50 mL/Hr) IV Continuous <Continuous>  dextrose 50% Injectable 12.5 Gram(s) IV Push once  dextrose 50% Injectable 25 Gram(s) IV Push once  dextrose 50% Injectable 25 Gram(s) IV Push once  famotidine    Tablet 20 milliGRAM(s) Oral daily  ferrous    sulfate Liquid 300 milliGRAM(s) Oral daily  gabapentin   Solution 300 milliGRAM(s) Oral two times a day  insulin lispro (HumaLOG) corrective regimen sliding scale   SubCutaneous every 6 hours  Medical Marijuana Oil 1 milliLiter(s),Medical Marijuana Oil 1 milliLiter(s),Medical Marijuana Oil 1 milliLiter(s) 1 milliLiter(s) Oral <User Schedule>  micafungin IVPB 100 milliGRAM(s) IV Intermittent every 24 hours  micafungin IVPB      predniSONE   Tablet 7.5 milliGRAM(s) Oral daily  QUEtiapine 25 milliGRAM(s) Oral at bedtime  tiotropium 18 MICROgram(s) Capsule 1 Capsule(s) Inhalation daily    MEDICATIONS  (PRN):  acetaminophen    Suspension .. 650 milliGRAM(s) Oral every 6 hours PRN Temp greater or equal to 38C (100.4F)  dextrose 40% Gel 15 Gram(s) Oral once PRN Blood Glucose LESS THAN 70 milliGRAM(s)/deciliter  glucagon  Injectable 1 milliGRAM(s) IntraMuscular once PRN Glucose LESS THAN 70 milligrams/deciliter  sodium chloride 0.65% Nasal 1 Spray(s) Both Nostrils two times a day PRN Nasal Congestion      EXAM:  Vital Signs Last 24 Hrs  T(C): 36.9 (07 Mar 2019 11:35), Max: 38.5 (06 Mar 2019 19:00)  T(F): 98.4 (07 Mar 2019 11:35), Max: 101.3 (06 Mar 2019 19:00)  HR: 104 (07 Mar 2019 11:35) (89 - 114)  BP: 123/74 (07 Mar 2019 11:35) (104/63 - 156/93)  BP(mean): --  RR: 18 (07 Mar 2019 11:35) (18 - 30)  SpO2: 97% (07 Mar 2019 11:35) (96% - 97%)    GENERAL: The patient is awake and alert in no apparent distress.   breathing with (dry) mouth open, upper airway sounds noted.  taking full breaths at a normal rate. does not appear in distress.    LUNGS: Clear to auscultation without wheezing, rales or rhonchi; respirations unlabored    HEART: S1/S2    LABS/IMAGING: reviewed                        10.2   14.99 )-----------( 562      ( 07 Mar 2019 08:33 )             34.0   03-07    139  |  100  |  42<H>  ----------------------------<  134<H>  4.6   |  23  |  0.43<L>    Ca    9.3      07 Mar 2019 06:16      < from: Xray Chest 1 View AP/PA (03.03.19 @ 07:36) >  IMPRESSION:   Interval removal of right-sided central line.  No focal consolidations.    < end of copied text >      PROBLEM LIST:  68y Female with HEALTH ISSUES - PROBLEM Dx:  PEG (percutaneous endoscopic gastrostomy) status: PEG (percutaneous endoscopic gastrostomy) status  Asthma, unspecified asthma severity, unspecified whether complicated, unspecified whether persistent: Asthma, unspecified asthma severity, unspecified whether complicated, unspecified whether persistent  History of pulmonary embolism: History of pulmonary embolism  Dementia without behavioral disturbance, unspecified dementia type: Dementia without behavioral disturbance, unspecified dementia type  Pressure injury of sacral region, unstageable: Pressure injury of sacral region, unstageable      RECS:  -patient does not appear in resp distress.  Over the last year she has been noted to have different breathing patterns, unclear if related to pain? dementia? anxiety?  -discussed with  at bedside that i would not make any changes to medications at this time as cxr clear, lungs sounds are normal, sat 97% on room air.  would focus on comfort.  -ID fu      Luciana Mancini MD   [phone]

## 2019-03-07 NOTE — PROGRESS NOTE ADULT - ASSESSMENT
67 yo f with multiple medical conditions admitted 2/26 with fever to 103.  She has spent the greater past of past 9 months in the hospital.  She had difficult to control MRSA bacteremia, finally controlled after RT hip was debrided and explanted.  s/p multiple treatment for pneumonia, stage 4 decubitus ,peg, and chronic gavin.  No obvious primary site for infection .urine culture with enterobacter and VRE, likely colonizers  She also has a chronic DVT and had unexplained fevers x 6 weeks during last hospital stay  1/4 bottles with a coag negative staph, likely a contaminant. CDT is negative  CT: patchy b/l groundglass opacities, no new consolidations, no acute abd pathology.  Her chest CT scan is unchanged c/w 2016, hence no evidence of an acute or subacute infection.  Rt hip is without collection and sacral decubitus is chronic.  We may be confronted with approaching her as a FUO as she did not respond to meropeenem, tigecycline, and is now on empiric dapto and micafungin.  Drug fever has been a concern in past.  S/P repeat cultures past few days, no new positives  Head CT is without acute process  Her fever and leukocytosis stable,no signs of progressive infection  Meropenem stopped 3/6, s/p 8 days  Suggest::  limit dapto and micafungin to 5 days, stop in AM  imaging of sacrum not likely to alter ID approach to management  osteo if present would be managed with local wound care, prior CT did not show an abscess  My suggestion after today would be to monitor off antibiotics, accepting fever as part of her condition.  We have not been able to identify a progressive infection and save for LP which I am not advising I would like to hold off on additional w/u

## 2019-03-07 NOTE — PROGRESS NOTE ADULT - SUBJECTIVE AND OBJECTIVE BOX
HPI:  Patient seen and examined on 4 Ravinder.  More awake and alert today with audible upper airway wheeze.  No evidence of volume overload on exam.    Review Of Systems:     unable to assess in the setting of severe dementia      Medications:  acetaminophen    Suspension .. 650 milliGRAM(s) Oral every 6 hours PRN  acetaminophen  IVPB .. 1000 milliGRAM(s) IV Intermittent once  ALBUTerol    90 MICROgram(s) HFA Inhaler 1 Puff(s) Inhalation every 4 hours  apixaban 5 milliGRAM(s) Oral every 12 hours  artificial  tears Solution 1 Drop(s) Both EYES every 6 hours  Biotene Dry Mouth Oral Rinse 5 milliLiter(s) Swish and Spit two times a day  clonazePAM Tablet 1.5 milliGRAM(s) Oral at bedtime  Dakins Solution - 1/4 Strength 1 Application(s) Topical two times a day  DAPTOmycin IVPB 240 milliGRAM(s) IV Intermittent every 24 hours  dextrose 40% Gel 15 Gram(s) Oral once PRN  dextrose 5%. 1000 milliLiter(s) IV Continuous <Continuous>  dextrose 50% Injectable 12.5 Gram(s) IV Push once  dextrose 50% Injectable 25 Gram(s) IV Push once  dextrose 50% Injectable 25 Gram(s) IV Push once  famotidine    Tablet 20 milliGRAM(s) Oral daily  ferrous    sulfate Liquid 300 milliGRAM(s) Oral daily  gabapentin   Solution 300 milliGRAM(s) Oral two times a day  glucagon  Injectable 1 milliGRAM(s) IntraMuscular once PRN  insulin lispro (HumaLOG) corrective regimen sliding scale   SubCutaneous every 6 hours  Medical Marijuana Oil 1 milliLiter(s),Medical Marijuana Oil 1 milliLiter(s),Medical Marijuana Oil 1 milliLiter(s) 1 milliLiter(s) Oral <User Schedule>  micafungin IVPB 100 milliGRAM(s) IV Intermittent every 24 hours  micafungin IVPB      midodrine 10 milliGRAM(s) Oral three times a day  predniSONE   Tablet 7.5 milliGRAM(s) Oral daily  QUEtiapine 25 milliGRAM(s) Oral at bedtime  sodium chloride 0.65% Nasal 1 Spray(s) Both Nostrils two times a day PRN  tiotropium 18 MICROgram(s) Capsule 1 Capsule(s) Inhalation daily    PAST MEDICAL & SURGICAL HISTORY:  CVA (cerebral vascular accident)  Fatty pancreas  PNA (pneumonia)  Pulmonary HTN  IGT (impaired glucose tolerance)  Ulcerative colitis  Acid reflux  Anxiety  Depression  Mouth sores  HLD (hyperlipidemia)  Asthma  Humeral head fracture  H/O: hysterectomy  H/O cataract extraction, left  History of knee replacement    Vitals:  T(C): 38.1 (19 @ 09:14), Max: 38.5 (19 @ 19:00)  HR: 105 (19 @ 08:50) (89 - 114)  BP: 124/70 (19 @ 08:50) (104/63 - 156/93)  BP(mean): --  RR: 20 (19 @ 08:50) (17 - 30)  SpO2: 96% (19 @ 08:50) (96% - 99%)  Wt(kg): --  Daily     Daily Weight in k.8 (07 Mar 2019 07:08)  I&O's Summary    06 Mar 2019 07:01  -  07 Mar 2019 07:00  --------------------------------------------------------  IN: 240 mL / OUT: 800 mL / NET: -560 mL        Physical Exam:  Appearance: functional quadriplegia   Eyes: PERRLA, EOMI, pink conjunctiva, no scleral icterus   HENT: Normal oral mucosa  Cardiovascular: RRR, S1, S2, no murmur, rub, or gallop; + edema in hands and feet; +JVD  Procedural Access Site: Clean, dry, intact, without hematoma  Respiratory: bilateral air entry; poor inspiratory effort  Gastrointestinal: Soft, non-tender, non-distended, BS+, +PEG  Musculoskeletal: +contracted  Neurologic: functional quadriplegia  Lymphatic: No lymphadenopathy  Psychiatry: advanced dementia  Skin: No rashes, ecchymoses, or cyanosis on extremities                            10.2   14.99 )-----------( 562      ( 07 Mar 2019 08:33 )             34.0         139  |  100  |  42<H>  ----------------------------<  134<H>  4.6   |  23  |  0.43<L>    Ca    9.3      07 Mar 2019 06:16              Cardiovascular Diagnostic Testing:    Echo: < from: Transthoracic Echocardiogram (18 @ 11:23) >  EF (Teicholtz): 26 %  Doppler Peak Velocity (m/sec): AoV=0.9  ------------------------------------------------------------------------  Observations:  Mitral Valve: Mitral annular calcification, otherwise  normal mitral valve. Moderate-severe mitral regurgitation  (vena contracta 0.8 cm)  Aortic Valve/Aorta: Calcified trileaflet aortic valve with  normal opening. Peak transaortic valve gradient equals 3 mm  Hg, mean transaortic valve gradient equals 1 mm Hg, aortic  valve velocity time integral equals 13 cm. Moderate aortic  regurgitation  Aortic Root: 2.9 cm.  Left Atrium: Moderately dilated left atrium.  LA volume  index = 45 cc/m2.  Left Ventricle: Severe global left ventricular systolic  dysfunction. Mild left ventricular enlargement. Increased  E/e'  is consistent with elevated left ventricular filling  pressure.  Right Heart: Moderate right atrial enlargement. Inferior  vena cava is dilated (>=2.5cm) with normal respiratory  variability consistent with right atrial pressure 16-20mm  Hg. Right ventricular enlargement with decreased right  ventricular systolic function. Normal tricuspid valve.  Severe tricuspid regurgitation. Pulmonic valve not well  visualized.  Pericardium/Pleura: Normal pericardium with no pericardial  effusion.  Hemodynamic: Estimated right atrial pressure is 8 mm Hg.  Estimated right ventricular systolic pressure equals 82 mm  Hg, assuming right atrial pressure equals 8 mm Hg,  consistent with severe pulmonary hypertension. Color  Doppler demonstrates evidence of left to right shunt  ------------------------------------------------------------------------  Conclusions:  1. Mitral annular calcification, otherwise normal mitral  valve. Moderate-severe mitral regurgitation (vena contracta  0.8 cm)  2. Calcified trileafletaortic valve with normal opening.  Peak transaortic valve gradient equals 3 mm Hg, mean  transaortic valve gradient equals 1 mm Hg, aortic valve  velocity time integral equals 13 cm. Moderate aortic  regurgitation  3. Moderately dilated left atrium.LA volume index = 45  cc/m2.  4. Severe global left ventricular systolic dysfunction.  5. Increased E/e'  is consistent with elevated left  ventricular filling pressure.  6. Moderate right atrial enlargement. Inferior vena cava is  dilated (>=2.5cm) with normal respiratory variability  consistent with right atrial pressure 16-20mm Hg.  7. Right ventricular enlargement with decreased right  ventricular systolic function.  8. Normal tricuspid valve. Severe tricuspid regurgitation.  9. Estimated pulmonary artery systolic pressure equals 82  mm Hg, assuming right atrial pressure equals 8 mm Hg,  consistent with severe pulmonary pressures.  10. Color Doppler demonstrates evidence of left to right  shunt  ------------------------------------------------------------------------  Confirmed on  12/3/2018 - 16:37:07 by Margaret Khan M.D.  ------------------------------------------------------------------------    < end of copied text >    Interpretation of Telemetry: NSR 80 - 90 bpm

## 2019-03-07 NOTE — PROGRESS NOTE ADULT - SUBJECTIVE AND OBJECTIVE BOX
CC: f/u for fever    Patient reports: no complaints, she sleeps most of day.receiving bolus feeds    REVIEW OF SYSTEMS:  All other review of systems negative (Comprehensive ROS): limited, not verbal    Antimicrobials Day #  :day   DAPTOmycin IVPB 240 milliGRAM(s) IV Intermittent every 24 hours  micafungin IVPB 100 milliGRAM(s) IV Intermittent every 24 hours  micafungin IVPB        Other Medications Reviewed    T(F): 98.4 (19 @ 11:35), Max: 101.3 (19 @ 19:00)  HR: 104 (19 @ 11:35)  BP: 123/74 (19 @ 11:35)  RR: 18 (19 @ 11:35)  SpO2: 97% (19 @ 11:35)  Wt(kg): --    PHYSICAL EXAM:  General: sleepy, no acute distress  Eyes:  anicteric, no conjunctival injection, no discharge  Oropharynx: no lesions or injection 	  Neck: supple, without adenopathy  Lungs: clear to auscultation, poor inspiratory effert  Heart: regular rate and rhythm; no murmur, rubs or gallops  Abdomen: soft, nondistended, nontender, without mass or organomegaly  Skin: no lesions  Extremities: no clubbing, cyanosis, or edema  Neurologic: lethargic, not interactive    LAB RESULTS:                        10.2   14.99 )-----------( 562      ( 07 Mar 2019 08:33 )             34.0     -    139  |  100  |  42<H>  ----------------------------<  134<H>  4.6   |  23  |  0.43<L>    Ca    9.3      07 Mar 2019 06:16        Urinalysis Basic - ( 06 Mar 2019 23:01 )    Color: Light Yellow / Appearance: Clear / S.019 / pH: x  Gluc: x / Ketone: Negative  / Bili: Negative / Urobili: <2 mg/dL   Blood: x / Protein: 30 mg/dL / Nitrite: Negative   Leuk Esterase: Negative / RBC: 8 /HPF / WBC 1 /HPF   Sq Epi: x / Non Sq Epi: 1 /HPF / Bacteria: Negative      MICROBIOLOGY:  RECENT CULTURES:   @ 17:14 .Blood Blood-Peripheral     No growth to date.       @ 11:53 .Blood Blood-Peripheral     No growth to date.          RADIOLOGY REVIEWED:  < from: CT Head No Cont (19 @ 22:39) >  IMPRESSION:    No acute intracranial hemorrhage, mass effect, vasogenic edema, or   evidence of acute territorial infarct.    Slight interval enlargement of the ventricles and sulci, consistent with   progressive atrophy.    Slightly increased nonspecific periventricular lucency may represent   white matter microvascular ischemic disease.    < end of copied text >

## 2019-03-08 LAB
ALBUMIN SERPL ELPH-MCNC: 2.4 G/DL — LOW (ref 3.3–5)
ALP SERPL-CCNC: 113 U/L — SIGNIFICANT CHANGE UP (ref 40–120)
ALT FLD-CCNC: 22 U/L — SIGNIFICANT CHANGE UP (ref 10–45)
ANION GAP SERPL CALC-SCNC: 13 MMOL/L — SIGNIFICANT CHANGE UP (ref 5–17)
AST SERPL-CCNC: 23 U/L — SIGNIFICANT CHANGE UP (ref 10–40)
BASOPHILS # BLD AUTO: 0.06 K/UL — SIGNIFICANT CHANGE UP (ref 0–0.2)
BASOPHILS NFR BLD AUTO: 0.4 % — SIGNIFICANT CHANGE UP (ref 0–2)
BILIRUB SERPL-MCNC: 0.1 MG/DL — LOW (ref 0.2–1.2)
BUN SERPL-MCNC: 34 MG/DL — HIGH (ref 7–23)
CALCIUM SERPL-MCNC: 8.7 MG/DL — SIGNIFICANT CHANGE UP (ref 8.4–10.5)
CHLORIDE SERPL-SCNC: 102 MMOL/L — SIGNIFICANT CHANGE UP (ref 96–108)
CO2 SERPL-SCNC: 26 MMOL/L — SIGNIFICANT CHANGE UP (ref 22–31)
CREAT SERPL-MCNC: 0.4 MG/DL — LOW (ref 0.5–1.3)
CULTURE RESULTS: SIGNIFICANT CHANGE UP
CULTURE RESULTS: SIGNIFICANT CHANGE UP
EOSINOPHIL # BLD AUTO: 1.38 K/UL — HIGH (ref 0–0.5)
EOSINOPHIL NFR BLD AUTO: 9.1 % — HIGH (ref 0–6)
GLUCOSE BLDC GLUCOMTR-MCNC: 105 MG/DL — HIGH (ref 70–99)
GLUCOSE BLDC GLUCOMTR-MCNC: 115 MG/DL — HIGH (ref 70–99)
GLUCOSE BLDC GLUCOMTR-MCNC: 122 MG/DL — HIGH (ref 70–99)
GLUCOSE BLDC GLUCOMTR-MCNC: 136 MG/DL — HIGH (ref 70–99)
GLUCOSE BLDC GLUCOMTR-MCNC: 196 MG/DL — HIGH (ref 70–99)
GLUCOSE SERPL-MCNC: 91 MG/DL — SIGNIFICANT CHANGE UP (ref 70–99)
HCT VFR BLD CALC: 30.2 % — LOW (ref 34.5–45)
HGB BLD-MCNC: 8.6 G/DL — LOW (ref 11.5–15.5)
IMM GRANULOCYTES NFR BLD AUTO: 1.1 % — SIGNIFICANT CHANGE UP (ref 0–1.5)
LYMPHOCYTES # BLD AUTO: 18.3 % — SIGNIFICANT CHANGE UP (ref 13–44)
LYMPHOCYTES # BLD AUTO: 2.76 K/UL — SIGNIFICANT CHANGE UP (ref 1–3.3)
MCHC RBC-ENTMCNC: 28.5 GM/DL — LOW (ref 32–36)
MCHC RBC-ENTMCNC: 28.5 PG — SIGNIFICANT CHANGE UP (ref 27–34)
MCV RBC AUTO: 100 FL — SIGNIFICANT CHANGE UP (ref 80–100)
MONOCYTES # BLD AUTO: 1.13 K/UL — HIGH (ref 0–0.9)
MONOCYTES NFR BLD AUTO: 7.5 % — SIGNIFICANT CHANGE UP (ref 2–14)
NEUTROPHILS # BLD AUTO: 9.61 K/UL — HIGH (ref 1.8–7.4)
NEUTROPHILS NFR BLD AUTO: 63.6 % — SIGNIFICANT CHANGE UP (ref 43–77)
PLATELET # BLD AUTO: 536 K/UL — HIGH (ref 150–400)
POTASSIUM SERPL-MCNC: 4.4 MMOL/L — SIGNIFICANT CHANGE UP (ref 3.5–5.3)
POTASSIUM SERPL-SCNC: 4.4 MMOL/L — SIGNIFICANT CHANGE UP (ref 3.5–5.3)
PROT SERPL-MCNC: 5 G/DL — LOW (ref 6–8.3)
RBC # BLD: 3.02 M/UL — LOW (ref 3.8–5.2)
RBC # FLD: 14 % — SIGNIFICANT CHANGE UP (ref 10.3–14.5)
SODIUM SERPL-SCNC: 141 MMOL/L — SIGNIFICANT CHANGE UP (ref 135–145)
SPECIMEN SOURCE: SIGNIFICANT CHANGE UP
SPECIMEN SOURCE: SIGNIFICANT CHANGE UP
WBC # BLD: 15.1 K/UL — HIGH (ref 3.8–10.5)
WBC # FLD AUTO: 15.1 K/UL — HIGH (ref 3.8–10.5)

## 2019-03-08 PROCEDURE — 99232 SBSQ HOSP IP/OBS MODERATE 35: CPT

## 2019-03-08 PROCEDURE — 99233 SBSQ HOSP IP/OBS HIGH 50: CPT

## 2019-03-08 RX ORDER — ACETAMINOPHEN 500 MG
600 TABLET ORAL ONCE
Qty: 0 | Refills: 0 | Status: COMPLETED | OUTPATIENT
Start: 2019-03-08 | End: 2019-03-08

## 2019-03-08 RX ORDER — IPRATROPIUM/ALBUTEROL SULFATE 18-103MCG
3 AEROSOL WITH ADAPTER (GRAM) INHALATION EVERY 6 HOURS
Qty: 0 | Refills: 0 | Status: DISCONTINUED | OUTPATIENT
Start: 2019-03-08 | End: 2019-03-18

## 2019-03-08 RX ADMIN — Medication 1: at 12:47

## 2019-03-08 RX ADMIN — GABAPENTIN 300 MILLIGRAM(S): 400 CAPSULE ORAL at 11:20

## 2019-03-08 RX ADMIN — Medication 1 APPLICATION(S): at 18:18

## 2019-03-08 RX ADMIN — Medication 7.5 MILLIGRAM(S): at 07:03

## 2019-03-08 RX ADMIN — Medication 300 MILLIGRAM(S): at 11:20

## 2019-03-08 RX ADMIN — Medication 3 MILLILITER(S): at 23:21

## 2019-03-08 RX ADMIN — Medication 3 MILLILITER(S): at 18:33

## 2019-03-08 RX ADMIN — APIXABAN 5 MILLIGRAM(S): 2.5 TABLET, FILM COATED ORAL at 22:52

## 2019-03-08 RX ADMIN — Medication 650 MILLIGRAM(S): at 12:59

## 2019-03-08 RX ADMIN — MICAFUNGIN SODIUM 105 MILLIGRAM(S): 100 INJECTION, POWDER, LYOPHILIZED, FOR SOLUTION INTRAVENOUS at 11:20

## 2019-03-08 RX ADMIN — Medication 1 APPLICATION(S): at 07:04

## 2019-03-08 RX ADMIN — Medication 5 MILLILITER(S): at 07:03

## 2019-03-08 RX ADMIN — LISINOPRIL 2.5 MILLIGRAM(S): 2.5 TABLET ORAL at 07:03

## 2019-03-08 RX ADMIN — Medication 1 DROP(S): at 11:20

## 2019-03-08 RX ADMIN — Medication 1 DROP(S): at 23:26

## 2019-03-08 RX ADMIN — FAMOTIDINE 20 MILLIGRAM(S): 10 INJECTION INTRAVENOUS at 11:20

## 2019-03-08 RX ADMIN — Medication 240 MILLIGRAM(S): at 23:40

## 2019-03-08 RX ADMIN — APIXABAN 5 MILLIGRAM(S): 2.5 TABLET, FILM COATED ORAL at 08:41

## 2019-03-08 RX ADMIN — Medication 3 MILLILITER(S): at 12:55

## 2019-03-08 RX ADMIN — Medication 1 DROP(S): at 07:03

## 2019-03-08 RX ADMIN — Medication 1 DROP(S): at 18:33

## 2019-03-08 RX ADMIN — QUETIAPINE FUMARATE 25 MILLIGRAM(S): 200 TABLET, FILM COATED ORAL at 23:20

## 2019-03-08 RX ADMIN — Medication 5 MILLILITER(S): at 18:33

## 2019-03-08 RX ADMIN — Medication 1.5 MILLIGRAM(S): at 23:20

## 2019-03-08 RX ADMIN — GABAPENTIN 300 MILLIGRAM(S): 400 CAPSULE ORAL at 22:52

## 2019-03-08 NOTE — PROGRESS NOTE ADULT - SUBJECTIVE AND OBJECTIVE BOX
CC: f/u for fever    Patient remains weak in general, somnolent, nonverbal, tolerating tube feeds    REVIEW OF SYSTEMS:  All other review of systems negative (Comprehensive ROS): limited    Antimicrobials Day # 6  DAPTOmycin IVPB 240 milliGRAM(s) IV Intermittent every 24 hours  micafungin IVPB 100 milliGRAM(s) IV Intermittent every 24 hours  micafungin IVPB        Other Medications Reviewed    Vital Signs Last 24 Hrs  T(F): 98.6 (08 Mar 2019 04:53), Max: 98.6 (08 Mar 2019 04:53)  HR: 99 (08 Mar 2019 04:53) (99 - 102)  BP: 115/73 (08 Mar 2019 04:53) (115/73 - 134/75)  BP(mean): --  RR: 18 (08 Mar 2019 04:53) (18 - 18)  SpO2: 97% (08 Mar 2019 04:53) (95% - 97%)    PHYSICAL EXAM:  General: no acute distress  Eyes:  anicteric, no conjunctival injection, no discharge  Oropharynx: no lesions or injection 	  Neck: supple, without adenopathy  Lungs: clear to auscultation, poor inspiratory effort  Heart: regular rate and rhythm; no murmur, rubs or gallops  Abdomen: soft, nondistended, nontender, G tube site clean  Skin: no lesions  Extremities: no clubbing, cyanosis, or edema  Neurologic: lethargic, not interactive    LAB RESULTS:                        8.6    15.10 )-----------( 536      ( 08 Mar 2019 10:06 )             30.2   03-08    141  |  102  |  34<H>  ----------------------------<  91  4.4   |  26  |  0.40<L>    Ca    8.7      08 Mar 2019 07:16    TPro  5.0<L>  /  Alb  2.4<L>  /  TBili  0.1<L>  /  DBili  x   /  AST  23  /  ALT  22  /  AlkPhos  113  03-08      Urinalysis Basic - ( 06 Mar 2019 23:01 )    Color: Light Yellow / Appearance: Clear / S.019 / pH: x  Gluc: x / Ketone: Negative  / Bili: Negative / Urobili: <2 mg/dL   Blood: x / Protein: 30 mg/dL / Nitrite: Negative   Leuk Esterase: Negative / RBC: 8 /HPF / WBC 1 /HPF   Sq Epi: x / Non Sq Epi: 1 /HPF / Bacteria: Negative      MICROBIOLOGY:  RECENT CULTURES:  Culture - Blood (19 @ 00:38)    Specimen Source: .Blood Blood-Peripheral    Culture Results:   No growth to date.     @ 17:14 .Blood Blood-Peripheral     No growth to date.      RADIOLOGY REVIEWED:  CT Head No Cont (19 @ 22:39) >  No acute intracranial hemorrhage, mass effect, vasogenic edema, or   evidence of acute territorial infarct.  Slight interval enlargement of the ventricles and sulci, consistent with   progressive atrophy.  Slightly increased nonspecific periventricular lucency may represent   white matter microvascular ischemic disease.      CT Chest No Cont (19 @ 16:32) >  Patchy bilateral groundglass opacities, unchanged since 2016. No new   consolidations.    CT Abdomen and Pelvis w/ Oral Cont (19 @ 08:46) >  Left-sided sacral decubitus ulcer without definite cortical erosion of   the underlying sacrum. MRI would be more sensitive to evaluate for   osteomyelitis.  No acute intra-abdominal pathology or collection.

## 2019-03-08 NOTE — PROGRESS NOTE ADULT - SUBJECTIVE AND OBJECTIVE BOX
MYRIAM [id number]  68y  Female  Sepsis  Family history of dementia (Grandparent)  Family history of colon cancer (Grandparent)  No pertinent family history in first degree relatives  Handoff  MEWS Score  CVA (cerebral vascular accident)  Fatty pancreas  Fatty liver  PNA (pneumonia)  Pulmonary HTN  IGT (impaired glucose tolerance)  Ulcerative colitis  Acid reflux  Anxiety  Depression  Mouth sores  HLD (hyperlipidemia)  Asthma  Sepsis  PEG (percutaneous endoscopic gastrostomy) status  Asthma, unspecified asthma severity, unspecified whether complicated, unspecified whether persistent  History of pulmonary embolism  Dementia without behavioral disturbance, unspecified dementia type  Pressure injury of sacral region, unstageable  Central line placement  Humeral head fracture  H/O: hysterectomy  H/O cataract extraction, left  History of knee replacement  FEVER  41    acetaminophen    Suspension .. 650 milliGRAM(s) Oral every 6 hours PRN  ALBUTerol    90 MICROgram(s) HFA Inhaler 1 Puff(s) Inhalation every 4 hours  apixaban 5 milliGRAM(s) Oral every 12 hours  artificial  tears Solution 1 Drop(s) Both EYES every 6 hours  Biotene Dry Mouth Oral Rinse 5 milliLiter(s) Swish and Spit two times a day  clonazePAM Tablet 1.5 milliGRAM(s) Oral at bedtime  Dakins Solution - 1/4 Strength 1 Application(s) Topical two times a day  DAPTOmycin IVPB 240 milliGRAM(s) IV Intermittent every 24 hours  dextrose 40% Gel 15 Gram(s) Oral once PRN  dextrose 5%. 1000 milliLiter(s) IV Continuous <Continuous>  dextrose 50% Injectable 12.5 Gram(s) IV Push once  dextrose 50% Injectable 25 Gram(s) IV Push once  dextrose 50% Injectable 25 Gram(s) IV Push once  famotidine    Tablet 20 milliGRAM(s) Oral daily  ferrous    sulfate Liquid 300 milliGRAM(s) Oral daily  gabapentin   Solution 300 milliGRAM(s) Oral two times a day  glucagon  Injectable 1 milliGRAM(s) IntraMuscular once PRN  insulin lispro (HumaLOG) corrective regimen sliding scale   SubCutaneous every 6 hours  lisinopril 2.5 milliGRAM(s) Oral daily  micafungin IVPB 100 milliGRAM(s) IV Intermittent every 24 hours  micafungin IVPB      predniSONE   Tablet 7.5 milliGRAM(s) Oral daily  QUEtiapine 25 milliGRAM(s) Oral at bedtime  sodium chloride 0.65% Nasal 1 Spray(s) Both Nostrils two times a day PRN  tiotropium 18 MICROgram(s) Capsule 1 Capsule(s) Inhalation daily  ASA; dye contrast (Anaphylaxis)  aspirin (Short breath)  divalproex sodium (Other (Unknown))  Haldol (Other (Unknown))  penicillin (Short breath; Rash)  sulfa drugs (Short breath; Rash)  vancomycin (Rash; Urticaria; Hives)  Xanax (Other (Unknown))    CBC Full  -  ( 07 Mar 2019 08:33 )  WBC Count : 14.99 K/uL  Hemoglobin : 10.2 g/dL  Hematocrit : 34.0 %  Platelet Count - Automated : 562 K/uL  Mean Cell Volume : 96.9 fl  Mean Cell Hemoglobin : 29.1 pg  Mean Cell Hemoglobin Concentration : 30.0 gm/dL  Auto Neutrophil # : x  Auto Lymphocyte # : x  Auto Monocyte # : x  Auto Eosinophil # : x  Auto Basophil # : x  Auto Neutrophil % : x  Auto Lymphocyte % : x  Auto Monocyte % : x  Auto Eosinophil % : x  Auto Basophil % : x    patient visited at bedside non-communicative INAD but severe contracture of limbs both legs. Consulted by wound team for feet evaluation, patient's  relates that he has been requesting podiatric evaluation for a long time.  Feet appear warm with no edema, no erythema and no signs of ascending cellulitis Pedal pulses are weakly palpable with no signs of acute infection. Patient using polymem left foot for padding and ulceration prevention as well as z-float boots. Interspaces are clean with elongated dystrophic incurvated appearing toenails 1,2,3,4,5 both feet  Debride all nails with sterile nail clipper without any incident. Apply alcohol to all nails and interspaces both feet. Thank you for consult reconsult podiatry if needed

## 2019-03-08 NOTE — PROGRESS NOTE ADULT - SUBJECTIVE AND OBJECTIVE BOX
PULMONARY PROGRESS NOTE    MYRIMA HEATH  [id number]    Patient is a 68y old  Female who presents with a chief complaint of Fevers for the past 2 days at home (07 Mar 2019 10:03)      HPI:  - at bedside thinks breathing looks better today.  pt awake with eyes open.    ROS:   -unable to obtain    MEDICATIONS  (STANDING):  ALBUTerol    90 MICROgram(s) HFA Inhaler 1 Puff(s) Inhalation every 4 hours  apixaban 5 milliGRAM(s) Oral every 12 hours  artificial  tears Solution 1 Drop(s) Both EYES every 6 hours  Biotene Dry Mouth Oral Rinse 5 milliLiter(s) Swish and Spit two times a day  clonazePAM Tablet 1.5 milliGRAM(s) Oral at bedtime  Dakins Solution - 1/4 Strength 1 Application(s) Topical two times a day  DAPTOmycin IVPB 240 milliGRAM(s) IV Intermittent every 24 hours  dextrose 5%. 1000 milliLiter(s) (50 mL/Hr) IV Continuous <Continuous>  dextrose 50% Injectable 12.5 Gram(s) IV Push once  dextrose 50% Injectable 25 Gram(s) IV Push once  dextrose 50% Injectable 25 Gram(s) IV Push once  famotidine    Tablet 20 milliGRAM(s) Oral daily  ferrous    sulfate Liquid 300 milliGRAM(s) Oral daily  gabapentin   Solution 300 milliGRAM(s) Oral two times a day  insulin lispro (HumaLOG) corrective regimen sliding scale   SubCutaneous every 6 hours  lisinopril 2.5 milliGRAM(s) Oral daily  micafungin IVPB 100 milliGRAM(s) IV Intermittent every 24 hours  micafungin IVPB      predniSONE   Tablet 7.5 milliGRAM(s) Oral daily  QUEtiapine 25 milliGRAM(s) Oral at bedtime  tiotropium 18 MICROgram(s) Capsule 1 Capsule(s) Inhalation daily    MEDICATIONS  (PRN):  acetaminophen    Suspension .. 650 milliGRAM(s) Oral every 6 hours PRN Temp greater or equal to 38C (100.4F)  dextrose 40% Gel 15 Gram(s) Oral once PRN Blood Glucose LESS THAN 70 milliGRAM(s)/deciliter  glucagon  Injectable 1 milliGRAM(s) IntraMuscular once PRN Glucose LESS THAN 70 milligrams/deciliter  sodium chloride 0.65% Nasal 1 Spray(s) Both Nostrils two times a day PRN Nasal Congestion          EXAM:  Vital Signs Last 24 Hrs  T(C): 37 (08 Mar 2019 04:53), Max: 37 (08 Mar 2019 04:53)  T(F): 98.6 (08 Mar 2019 04:53), Max: 98.6 (08 Mar 2019 04:53)  HR: 99 (08 Mar 2019 04:53) (99 - 104)  BP: 115/73 (08 Mar 2019 04:53) (115/73 - 134/75)  BP(mean): --  RR: 18 (08 Mar 2019 04:53) (18 - 18)  SpO2: 97% (08 Mar 2019 04:53) (95% - 97%)    GENERAL: The patient is awake and alert in no apparent distress.       LUNGS: Clear to auscultation without wheezing, rales or rhonchi; respirations unlabored, breathing less loud today.    HEART: S1/S2    LABS/IMAGING: reviewed                        10.2   14.99 )-----------( 562      ( 07 Mar 2019 08:33 )             34.0   03-08    141  |  102  |  34<H>  ----------------------------<  91  4.4   |  26  |  0.40<L>    Ca    8.7      08 Mar 2019 07:16    TPro  5.0<L>  /  Alb  2.4<L>  /  TBili  0.1<L>  /  DBili  x   /  AST  23  /  ALT  22  /  AlkPhos  113  03-08      < from: Xray Chest 1 View AP/PA (03.03.19 @ 07:36) >  IMPRESSION:   Interval removal of right-sided central line.  No focal consolidations.    < end of copied text >      PROBLEM LIST:  68y Female with HEALTH ISSUES - PROBLEM Dx:  PEG (percutaneous endoscopic gastrostomy) status: PEG (percutaneous endoscopic gastrostomy) status  Asthma NOT in exacerbation  History of pulmonary embolism: History of pulmonary embolism  Dementia without behavioral disturbance, unspecified dementia type: Dementia without behavioral disturbance, unspecified dementia type  Pressure injury of sacral region, unstageable: Pressure injury of sacral region, unstageable      RECS:  -patient does not appear in resp distress.  Over the last year she has been noted to have different breathing patterns, unclear if related to pain? dementia? anxiety?  -discussed with  at bedside yesterday that i would not make any changes to medications at this time as cxr clear, lungs sounds are normal, sat 97% on room air.    -ID fu appreciated, agree.  -very poor prognosis overall, best to focus on comfort care.      Luciana Mancini MD   [phone]

## 2019-03-08 NOTE — PROGRESS NOTE ADULT - ASSESSMENT
69 yo F with multiple medical conditions admitted 2/26 with fever to 103.  She has spent the greater part of past 9 months in hospital.  Prior MRSA bacteremia, infected R THR, controlled after R hip GABRIEL  S/p multiple courses for pneumonia, stage 4 decubitus, G tube, and chronic Blackman  No obvious primary site for infection- urine culture with enterobacter and VRE, likely colonizers  She also has a chronic DVT and had unexplained fevers x 6 weeks during last hospital stay  1/4 bottles with a coag negative staph, likely a contaminant. CDT negative  CT: patchy b/l groundglass opacities, no new consolidations, no acute abd pathology.  Her chest CT scan is unchanged c/w 2016, hence no evidence of an acute or subacute infection.  R hip is without collection and sacral decubitus is chronic.  We may be confronted with approaching her as a FUO as she did not respond to Meropenem, tigecycline, and now on empiric dapto and micafungin.  Repeat cultures negative  RVP negative  Leg Ag negative  Head CT without acute process  Afebrile past 24 hrs, and leukocytosis less  Meropenem stopped 3/6, s/p 8 days  Will d/c Dapto and Micafungin- now Day 6  Could be faced with aspiration events, poor clearance of secretions    Suggest:  Approach as FUO, hope to avoid further empiric abx  Re culture as necessary  Findings, no definitive new source, possible aspiration, limitations of abx, d/w  at length

## 2019-03-08 NOTE — PROGRESS NOTE ADULT - ASSESSMENT
_________________________________________________________________________________________  ========>>  M E D I C A L   A T T E N D I N G  (covering)   F O L L O W  U P  N O T E  <<=========  -----------------------------------------------------------------------------------------------------    - Patient seen and examined by me earlier today.   - In summary,  MYRIAM HEATH is a 68y year old woman who originally presented with fever  - Patient today overall doing fairly, but still spiking fever , comfortable, eating fairly     ==================>> REVIEW OF SYSTEM <<=================    limited ROS as pt not verbal much     ==================>> PHYSICAL EXAM <<=================    GEN: awake, alert , NAD , comfortable in bed   HEENT: NCAT, PERRL, MMM, hearing intact  Neck: supple , no JVD  CVS: S1S2 , regular , No M/R/G appreciated  PULM: CTA B/L,  no W/R/R appreciated  ABD.: soft. non tender, non distended,  bowel sounds present  Extrem: intact pulses , no edema   PSYCH : normal mood,  not anxious      ==================>> MEDICATIONS <<====================    MEDICATIONS  (STANDING):  ALBUTerol    90 MICROgram(s) HFA Inhaler 1 Puff(s) Inhalation every 4 hours  ALBUTerol/ipratropium for Nebulization 3 milliLiter(s) Nebulizer every 6 hours  apixaban 5 milliGRAM(s) Oral every 12 hours  artificial  tears Solution 1 Drop(s) Both EYES every 6 hours  Biotene Dry Mouth Oral Rinse 5 milliLiter(s) Swish and Spit two times a day  clonazePAM Tablet 1.5 milliGRAM(s) Oral at bedtime  Dakins Solution - 1/4 Strength 1 Application(s) Topical two times a day  dextrose 5%. 1000 milliLiter(s) (50 mL/Hr) IV Continuous <Continuous>  dextrose 50% Injectable 12.5 Gram(s) IV Push once  dextrose 50% Injectable 25 Gram(s) IV Push once  dextrose 50% Injectable 25 Gram(s) IV Push once  famotidine    Tablet 20 milliGRAM(s) Oral daily  ferrous    sulfate Liquid 300 milliGRAM(s) Oral daily  gabapentin   Solution 300 milliGRAM(s) Oral two times a day  insulin lispro (HumaLOG) corrective regimen sliding scale   SubCutaneous every 6 hours  lisinopril 2.5 milliGRAM(s) Oral daily  predniSONE   Tablet 7.5 milliGRAM(s) Oral daily  QUEtiapine 25 milliGRAM(s) Oral at bedtime  tiotropium 18 MICROgram(s) Capsule 1 Capsule(s) Inhalation daily    MEDICATIONS  (PRN):  acetaminophen    Suspension .. 650 milliGRAM(s) Oral every 6 hours PRN Temp greater or equal to 38C (100.4F)  dextrose 40% Gel 15 Gram(s) Oral once PRN Blood Glucose LESS THAN 70 milliGRAM(s)/deciliter  glucagon  Injectable 1 milliGRAM(s) IntraMuscular once PRN Glucose LESS THAN 70 milligrams/deciliter  sodium chloride 0.65% Nasal 1 Spray(s) Both Nostrils two times a day PRN Nasal Congestion      ==================>> VITAL SIGNS <<==================    T(C): 38.2 (19 @ 12:54), Max: 38.2 (19 @ 12:54)  HR: 109 (19 @ 12:54) (98 - 109)  BP: 114/72 (19 @ 12:54) (114/66 - 134/75)  RR: 18 (19 @ 12:54) (17 - 18)  SpO2: 98% (19 @ 12:54) (95% - 98%)    POCT Blood Glucose.: 196 mg/dL (08 Mar 2019 12:00)  POCT Blood Glucose.: 115 mg/dL (08 Mar 2019 06:26)  POCT Blood Glucose.: 105 mg/dL (08 Mar 2019 00:39)  POCT Blood Glucose.: 147 mg/dL (07 Mar 2019 19:41)    I&O's Summary    07 Mar 2019 07:  -  08 Mar 2019 07:00  --------------------------------------------------------  IN: 1460 mL / OUT: 1200 mL / NET: 260 mL    08 Mar 2019 07:01  -  08 Mar 2019 16:19  --------------------------------------------------------  IN: 0 mL / OUT: 300 mL / NET: -300 mL       ==================>> LAB AND IMAGING <<==================                        8.6    15.10 )-----------( 536      ( 08 Mar 2019 10:06 )             30.2        WBC count:   15.10 <<== ,  14.99 <<== ,  13.87 <<== ,  15.51 <<== ,  16.91 <<==     Hemoglobin:   8.6 <<==,  10.2 <<==,  8.8 <<==,  8.9 <<==,  8.7 <<==    141  |  102  |  34<H>  ----------------------------<  91  4.4   |  26  |  0.40<L>    Ca    8.7      08 Mar 2019 07:16    TPro  5.0<L>  /  Alb  2.4<L>  /  TBili  0.1<L>  /  DBili  x   /  AST  23  /  ALT  22  /  AlkPhos  113  03-08              ABG - ( 07 Mar 2019 13:35 )  pH, Arterial: 7.48  pH, Blood: x     /  pCO2: 36    /  pO2: 76    / HCO3: 26    / Base Excess: 3.3   /  SaO2: 97        Urinalysis Basic - ( 06 Mar 2019 23:01 )  Color: Light Yellow / Appearance: Clear / S.019 / pH: x  Gluc: x / Ketone: Negative  / Bili: Negative / Urobili: <2 mg/dL   Blood: x / Protein: 30 mg/dL / Nitrite: Negative   Leuk Esterase: Negative / RBC: 8 /HPF / WBC 1 /HPF   Sq Epi: x / Non Sq Epi: 1 /HPF / Bacteria: Negative    HgA1C: 5.3  (19)            _______________________  C U L T U R E S :    Culture - Blood (collected 07 Mar 2019 00:38)  Source: .Blood Blood-Peripheral  Preliminary Report (08 Mar 2019 01:01):    No growth to date.    Culture - Blood (collected 07 Mar 2019 00:38)  Source: .Blood Blood-Peripheral  Preliminary Report (08 Mar 2019 01:01):    No growth to date.    Culture - Blood (collected 03 Mar 2019 17:14)  Source: .Blood Blood-Peripheral  Preliminary Report (04 Mar 2019 18:01):    No growth to date.    Culture - Blood (collected 03 Mar 2019 11:53)  Source: .Blood Blood-Peripheral  Final Report (08 Mar 2019 12:00):    No growth at 5 days.    ___________________________________________________________________________________  ===============>>  A S S E S S M E N T   A N D   P L A N <<===============  ------------------------------------------------------------------------------------------    fever , unclear etiology  sepsis >> FUO, post courses of broad spectrum Abx      id appreciated: awaiting repeat cultures   monitor off abx  aspiration precautions  turn and position  DVT PPx ( on Eliquis)     chf : low dose lisinopril   dementia agitation  continue current medication    -GI/DVT Prophylaxis.    --------------------------------------------  Case discussed with family at bedside, RN   Education given on findings and plan of care  ___________________________  HPreston Pacheco D.O. (covering)   Pager: [phone]

## 2019-03-08 NOTE — PROGRESS NOTE ADULT - ASSESSMENT
A/P: 67 yo female with multiple medical conditions admitted 2/26 with fever to 103.    Sacral stage 4 pressure injury- dakins packng  Rt Ischium unstageable pressure injury- comfeel  Evolving DTI BLE - CAVILON  Consider MRI, CT  BLE elevation  Abx per Medicine/ ID  Moisturize intact skin w/ SWEEN cream BID  con't Nutrition (as tolerated), Nutrition Consult  con't Offloading   con't Pericare  Care as per medicine will follow w/ you  Upon discharge f/u as outpatient at [facility] [address] [facility] [phone]  Seen w/ attng and D/w team  Mona Quick PA-C [id number]  I spent 25  minutes w/ this pt of which more than 50% of the time was spent counseling & coordinating care of this pt.

## 2019-03-08 NOTE — PROGRESS NOTE ADULT - SUBJECTIVE AND OBJECTIVE BOX
SUBJECTIVE: Pt seen, chart reviewed.   at bedside- all questions asked and answered to his satisfaction  No odor, redness, warmth, excess drainage noted  today no f/c/s  pt more alert, nonverbal, follows w/ eyes  pt was also NPO for several days  (+)incontinent (+)sedentary. Offloading & pericare initiated upon admission & on going     ROS pt unable to concern    apixaban 5 milliGRAM(s) Oral every 12 hours      Physical Exam:  Vital Signs Last 24 Hrs  T(C): 38.2 (08 Mar 2019 12:54), Max: 38.2 (08 Mar 2019 12:54)  T(F): 100.7 (08 Mar 2019 12:54), Max: 100.7 (08 Mar 2019 12:54)  HR: 109 (08 Mar 2019 12:54) (98 - 109)  BP: 114/72 (08 Mar 2019 12:54) (114/66 - 134/75)  BP(mean): --  RR: 18 (08 Mar 2019 12:54) (17 - 18)  SpO2: 98% (08 Mar 2019 12:54) (95% - 98%)  General Appearance:     NAD, Alert, cachectic  Versa Care P500    Musculoskeletal/Vascular:   no BLE edema   BLE equally warm  no acute ischemia noted  BUE/BLE w/ contractures    Skin:  dry & frail      Rt Medial bunion w/ 1cm x 0.3cm x 0cm DTI w/ blanchable erythema  Rt Heel w/ blanchable erythema w/o wounds  Lt Lateral malleolus 1.5cm x 0.8cm x 0cm DTI   Rt medial knee w/ 2.5cm x 2.5cm x 0cm DTI  No blistering nor open wounds  No drainage, odor, erythema, increased warmth, tenderness, induration, fluctuance    Stage 4 Sacral pressure injury  7.5cm x 8cm x 2.6cm w/ undermining 7-1 o'clock w/ greatest at 12 of 4.6cm  less necrotic & nonviable tissue  scant moist & granular  (+) serosanguinous drainage    Rt Ischium 5cm x 3.5cm x 0cm  unstageable w/ soft moist nonviable tissue   periphery of wound reepithelializing w/o drainage    No odor, erythema, increased warmth, tenderness, induration, fluctuance    LABS:                        8.6    15.10 )-----------( 536      ( 08 Mar 2019 10:06 )             30.2         RADIOLOGY & ADDITIONAL STUDIES:  < from: Xray Chest 1 View- PORTABLE-Routine (03.06.19 @ 18:46) >  FINDINGS:     Limited evaluation secondary to patient rotation.    The right lung is clear. Limited evaluation of the left lung base   secondary to overlapping structures. No pneumothoraces.    The cardiomediastinal silhouette cannot be accurately assessed on the   current projection.    Degenerative changes of the spine.    IMPRESSION:    The right lung is clear. Limited evaluation of the left lung base   secondary to overlapping structures. No pneumothoraces.    < end of copied text >      CULTURES:  Culture - Blood (03.07.19 @ 00:38)    Specimen Source: .Blood Blood-Peripheral    Culture Results:   No growth to date.

## 2019-03-09 LAB
ALBUMIN SERPL ELPH-MCNC: 2.9 G/DL — LOW (ref 3.3–5)
ALP SERPL-CCNC: 123 U/L — HIGH (ref 40–120)
ALT FLD-CCNC: 20 U/L — SIGNIFICANT CHANGE UP (ref 10–45)
ANION GAP SERPL CALC-SCNC: 17 MMOL/L — SIGNIFICANT CHANGE UP (ref 5–17)
AST SERPL-CCNC: 20 U/L — SIGNIFICANT CHANGE UP (ref 10–40)
BILIRUB SERPL-MCNC: 0.2 MG/DL — SIGNIFICANT CHANGE UP (ref 0.2–1.2)
BUN SERPL-MCNC: 42 MG/DL — HIGH (ref 7–23)
CALCIUM SERPL-MCNC: 8.9 MG/DL — SIGNIFICANT CHANGE UP (ref 8.4–10.5)
CHLORIDE SERPL-SCNC: 99 MMOL/L — SIGNIFICANT CHANGE UP (ref 96–108)
CO2 SERPL-SCNC: 23 MMOL/L — SIGNIFICANT CHANGE UP (ref 22–31)
CREAT SERPL-MCNC: 0.48 MG/DL — LOW (ref 0.5–1.3)
GLUCOSE BLDC GLUCOMTR-MCNC: 112 MG/DL — HIGH (ref 70–99)
GLUCOSE BLDC GLUCOMTR-MCNC: 117 MG/DL — HIGH (ref 70–99)
GLUCOSE BLDC GLUCOMTR-MCNC: 122 MG/DL — HIGH (ref 70–99)
GLUCOSE BLDC GLUCOMTR-MCNC: 217 MG/DL — HIGH (ref 70–99)
GLUCOSE BLDC GLUCOMTR-MCNC: 33 MG/DL — CRITICAL LOW (ref 70–99)
GLUCOSE BLDC GLUCOMTR-MCNC: 33 MG/DL — CRITICAL LOW (ref 70–99)
GLUCOSE BLDC GLUCOMTR-MCNC: 52 MG/DL — LOW (ref 70–99)
GLUCOSE BLDC GLUCOMTR-MCNC: 62 MG/DL — LOW (ref 70–99)
GLUCOSE BLDC GLUCOMTR-MCNC: 70 MG/DL — SIGNIFICANT CHANGE UP (ref 70–99)
GLUCOSE BLDC GLUCOMTR-MCNC: 73 MG/DL — SIGNIFICANT CHANGE UP (ref 70–99)
GLUCOSE BLDC GLUCOMTR-MCNC: 78 MG/DL — SIGNIFICANT CHANGE UP (ref 70–99)
GLUCOSE BLDC GLUCOMTR-MCNC: 84 MG/DL — SIGNIFICANT CHANGE UP (ref 70–99)
GLUCOSE BLDC GLUCOMTR-MCNC: 94 MG/DL — SIGNIFICANT CHANGE UP (ref 70–99)
GLUCOSE SERPL-MCNC: 105 MG/DL — HIGH (ref 70–99)
HCT VFR BLD CALC: 30.3 % — LOW (ref 34.5–45)
HGB BLD-MCNC: 9 G/DL — LOW (ref 11.5–15.5)
MCHC RBC-ENTMCNC: 29.7 GM/DL — LOW (ref 32–36)
MCHC RBC-ENTMCNC: 29.9 PG — SIGNIFICANT CHANGE UP (ref 27–34)
MCV RBC AUTO: 100.7 FL — HIGH (ref 80–100)
PLATELET # BLD AUTO: 487 K/UL — HIGH (ref 150–400)
POTASSIUM SERPL-MCNC: 4.4 MMOL/L — SIGNIFICANT CHANGE UP (ref 3.5–5.3)
POTASSIUM SERPL-SCNC: 4.4 MMOL/L — SIGNIFICANT CHANGE UP (ref 3.5–5.3)
PROT SERPL-MCNC: 5.5 G/DL — LOW (ref 6–8.3)
RBC # BLD: 3.01 M/UL — LOW (ref 3.8–5.2)
RBC # FLD: 14.8 % — HIGH (ref 10.3–14.5)
SODIUM SERPL-SCNC: 139 MMOL/L — SIGNIFICANT CHANGE UP (ref 135–145)
WBC # BLD: 18.49 K/UL — HIGH (ref 3.8–10.5)
WBC # FLD AUTO: 18.49 K/UL — HIGH (ref 3.8–10.5)

## 2019-03-09 PROCEDURE — 99232 SBSQ HOSP IP/OBS MODERATE 35: CPT

## 2019-03-09 RX ORDER — DEXTROSE 50 % IN WATER 50 %
25 SYRINGE (ML) INTRAVENOUS ONCE
Qty: 0 | Refills: 0 | Status: COMPLETED | OUTPATIENT
Start: 2019-03-09 | End: 2019-03-09

## 2019-03-09 RX ORDER — SODIUM CHLORIDE 9 MG/ML
500 INJECTION INTRAMUSCULAR; INTRAVENOUS; SUBCUTANEOUS ONCE
Qty: 0 | Refills: 0 | Status: COMPLETED | OUTPATIENT
Start: 2019-03-09 | End: 2019-03-09

## 2019-03-09 RX ADMIN — FAMOTIDINE 20 MILLIGRAM(S): 10 INJECTION INTRAVENOUS at 11:33

## 2019-03-09 RX ADMIN — Medication 5 MILLILITER(S): at 17:54

## 2019-03-09 RX ADMIN — GABAPENTIN 300 MILLIGRAM(S): 400 CAPSULE ORAL at 22:01

## 2019-03-09 RX ADMIN — Medication 650 MILLIGRAM(S): at 23:52

## 2019-03-09 RX ADMIN — Medication 1 DROP(S): at 17:54

## 2019-03-09 RX ADMIN — GABAPENTIN 300 MILLIGRAM(S): 400 CAPSULE ORAL at 11:24

## 2019-03-09 RX ADMIN — Medication 1 APPLICATION(S): at 17:54

## 2019-03-09 RX ADMIN — LISINOPRIL 2.5 MILLIGRAM(S): 2.5 TABLET ORAL at 05:55

## 2019-03-09 RX ADMIN — APIXABAN 5 MILLIGRAM(S): 2.5 TABLET, FILM COATED ORAL at 19:55

## 2019-03-09 RX ADMIN — Medication 300 MILLIGRAM(S): at 11:33

## 2019-03-09 RX ADMIN — Medication 3 MILLILITER(S): at 17:54

## 2019-03-09 RX ADMIN — Medication 650 MILLIGRAM(S): at 18:40

## 2019-03-09 RX ADMIN — Medication 25 GRAM(S): at 07:00

## 2019-03-09 RX ADMIN — Medication 25 GRAM(S): at 08:00

## 2019-03-09 RX ADMIN — Medication 5 MILLILITER(S): at 05:54

## 2019-03-09 RX ADMIN — Medication 2: at 11:55

## 2019-03-09 RX ADMIN — SODIUM CHLORIDE 500 MILLILITER(S): 9 INJECTION INTRAMUSCULAR; INTRAVENOUS; SUBCUTANEOUS at 02:32

## 2019-03-09 RX ADMIN — Medication 1.5 MILLIGRAM(S): at 22:01

## 2019-03-09 RX ADMIN — APIXABAN 5 MILLIGRAM(S): 2.5 TABLET, FILM COATED ORAL at 08:09

## 2019-03-09 RX ADMIN — Medication 3 MILLILITER(S): at 23:52

## 2019-03-09 RX ADMIN — Medication 7.5 MILLIGRAM(S): at 05:55

## 2019-03-09 RX ADMIN — Medication 3 MILLILITER(S): at 05:55

## 2019-03-09 RX ADMIN — Medication 600 MILLIGRAM(S): at 00:13

## 2019-03-09 RX ADMIN — Medication 1 APPLICATION(S): at 05:53

## 2019-03-09 RX ADMIN — Medication 3 MILLILITER(S): at 11:33

## 2019-03-09 RX ADMIN — QUETIAPINE FUMARATE 25 MILLIGRAM(S): 200 TABLET, FILM COATED ORAL at 22:01

## 2019-03-09 RX ADMIN — Medication 1 DROP(S): at 11:33

## 2019-03-09 RX ADMIN — Medication 650 MILLIGRAM(S): at 19:49

## 2019-03-09 RX ADMIN — Medication 1 DROP(S): at 05:53

## 2019-03-09 NOTE — PROGRESS NOTE ADULT - ASSESSMENT
69 yo F with multiple medical conditions admitted 2/26 with fever to 103.  She has spent the greater part of past 9 months in hospital.  Prior MRSA bacteremia, infected R THR, controlled after R hip GABRIEL  S/p multiple courses for pneumonia, stage 4 decubitus, G tube, and chronic Blackman  No obvious primary site for infection- urine culture with enterobacter and VRE, likely colonizers  She also has a chronic DVT and had unexplained fevers x 6 weeks during last hospital stay  1/4 bottles with a coag negative staph, likely a contaminant. CDT negative  CT: patchy b/l groundglass opacities, no new consolidations, no acute abd pathology.  Her chest CT scan is unchanged c/w 2016, hence no evidence of an acute or subacute infection.  R hip is without collection and sacral decubitus is chronic.  We may be confronted with approaching her as a FUO as she did not respond to Meropenem, tigecycline, andrecent  empiric dapto and micafungin.  Repeat cultures negative  RVP negative  Leg Ag negative  Head CT without acute process  Meropenem stopped 3/6, s/p 8 days   Dapto and Micafungin- stopped 3/8  Could be faced with aspiration events, poor clearance of secretions  Could be faced with central temp dysregulation  Suggest:  Approach as FUO, hope to avoid further empiric abx  Re culture as necessary  she appears at her baseline  We have not been able to find infectious explanation for fever. 67 yo F with multiple medical conditions admitted 2/26 with fever to 103.  She has spent the greater part of past 9 months in hospital.  Prior MRSA bacteremia, infected R THR, controlled after R hip GABRIEL  S/p multiple courses for pneumonia, stage 4 decubitus, G tube, and chronic Blackman  No obvious primary site for infection- urine culture with enterobacter and VRE, likely colonizers  She also has a chronic DVT and had unexplained fevers x 6 weeks during last hospital stay  1/4 bottles with a coag negative staph, likely a contaminant. CDT negative  CT: patchy b/l groundglass opacities, no new consolidations, no acute abd pathology.  Her chest CT scan is unchanged c/w 2016, hence no evidence of an acute or subacute infection.  R hip is without collection and sacral decubitus is chronic.  We may be confronted with approaching her as a FUO as she did not respond to Meropenem, tigecycline, andrecent  empiric dapto and micafungin.  Repeat cultures negative  RVP negative  Leg Ag negative  Head CT without acute process  Meropenem stopped 3/6, s/p 8 days   Dapto and Micafungin- stopped 3/8  Could be faced with aspiration events, poor clearance of secretions  Could be faced with central temp dysregulation  Suggest:  Approach as FUO, hope to avoid further empiric abx  Re culture as necessary  she appears at her baseline  We have not been able to find infectious explanation for fever.   sleeping soundly and not awakened.

## 2019-03-09 NOTE — PROGRESS NOTE ADULT - SUBJECTIVE AND OBJECTIVE BOX
Pulmonary Consult Note    MYRIAM HEATH  MRN-[id number]    Chief Complaint: Patient is a 68y old  Female who presents with a chief complaint of Fevers for the past 2 days at home (09 Mar 2019 07:27)      HPI:  68yFemale  -admission with recurrent fever  Non verbal  Overnight per RN did have fever  no change in respiratory status  no upper airway secretions or cough  no reported audible wheeze  -    ROS:  -Asthma   Hiatal Hernia   Dementia  s/p recurrent hospital admissions with hx MRSA bacteremia  Infected Hip replacement  Hx pneumonia  stage 4 decubitus      All other systems reviewed and negative    ACTIVE MEDICATION LIST:  MEDICATIONS  (STANDING):  ALBUTerol    90 MICROgram(s) HFA Inhaler 1 Puff(s) Inhalation every 4 hours  ALBUTerol/ipratropium for Nebulization 3 milliLiter(s) Nebulizer every 6 hours  apixaban 5 milliGRAM(s) Oral every 12 hours  artificial  tears Solution 1 Drop(s) Both EYES every 6 hours  Biotene Dry Mouth Oral Rinse 5 milliLiter(s) Swish and Spit two times a day  clonazePAM Tablet 1.5 milliGRAM(s) Oral at bedtime  Dakins Solution - 1/4 Strength 1 Application(s) Topical two times a day  dextrose 5%. 1000 milliLiter(s) (50 mL/Hr) IV Continuous <Continuous>  dextrose 50% Injectable 12.5 Gram(s) IV Push once  dextrose 50% Injectable 25 Gram(s) IV Push once  dextrose 50% Injectable 25 Gram(s) IV Push once  dextrose 50% Injectable 25 Gram(s) IV Push once  famotidine    Tablet 20 milliGRAM(s) Oral daily  ferrous    sulfate Liquid 300 milliGRAM(s) Oral daily  gabapentin   Solution 300 milliGRAM(s) Oral two times a day  insulin lispro (HumaLOG) corrective regimen sliding scale   SubCutaneous every 6 hours  lisinopril 2.5 milliGRAM(s) Oral daily  predniSONE   Tablet 7.5 milliGRAM(s) Oral daily  QUEtiapine 25 milliGRAM(s) Oral at bedtime  tiotropium 18 MICROgram(s) Capsule 1 Capsule(s) Inhalation daily    MEDICATIONS  (PRN):  acetaminophen    Suspension .. 650 milliGRAM(s) Oral every 6 hours PRN Temp greater or equal to 38C (100.4F)  dextrose 40% Gel 15 Gram(s) Oral once PRN Blood Glucose LESS THAN 70 milliGRAM(s)/deciliter  glucagon  Injectable 1 milliGRAM(s) IntraMuscular once PRN Glucose LESS THAN 70 milligrams/deciliter  sodium chloride 0.65% Nasal 1 Spray(s) Both Nostrils two times a day PRN Nasal Congestion      EXAM:  Vital Signs Last 24 Hrs  T(C): 36.7 (09 Mar 2019 06:33), Max: 38.6 (09 Mar 2019 03:05)  T(F): 98 (09 Mar 2019 06:33), Max: 101.4 (09 Mar 2019 03:05)  HR: 75 (09 Mar 2019 06:33) (75 - 122)  BP: 112/73 (09 Mar 2019 06:33) (81/43 - 123/75)  BP(mean): --  RR: 18 (09 Mar 2019 06:33) (17 - 22)  SpO2: 98% (09 Mar 2019 06:33) (96% - 100%)    GENERAL: No acute distress  NEURO: Alert and oriented x 3  LUNGS: Clear to auscultation bilaterally, no rales or wheezes  CV: S1/S2, no murmur  Benign ABD soft   feeding tube   no edema                          8.6    15.10 )-----------( 536      ( 08 Mar 2019 10:06 )             30.2     03-08    141  |  102  |  34<H>  ----------------------------<  91  4.4   |  26  |  0.40<L>    Ca    8.7      08 Mar 2019 07:16    TPro  5.0<L>  /  Alb  2.4<L>  /  TBili  0.1<L>  /  DBili  x   /  AST  23  /  ALT  22  /  AlkPhos  113  03-08   Blood culture neg x4  < from: Xray Chest 1 View- PORTABLE-Routine (03.06.19 @ 18:46) >  EXAM:  XR CHEST PORTABLE ROUTINE 1V                            PROCEDURE DATE:  03/06/2019            INTERPRETATION:  CLINICAL INFORMATION: Wheezing. History of congestive   heart failure.    TECHNIQUE: AP view of the chest.    COMPARISON: Chest radiograph 3/3/2019. CT chest 3/1/2018.    FINDINGS:     Limited evaluation secondary to patient rotation.    The right lung is clear. Limited evaluation of the left lung base   secondary to overlapping structures. No pneumothoraces.    The cardiomediastinal silhouette cannot be accurately assessed on the   current projection.    Degenerative changes of the spine.    IMPRESSION:    The right lung is clear. Limited evaluation of the left lung base   secondary to overlapping structures. No pneumothoraces.      < end of copied text >    < from: CT Abdomen and Pelvis w/ Oral Cont (03.02.19 @ 08:46) >  FINDINGS:    Limited evaluation of the solid visceral organs and vasculature without   intravenous contrast. Slightly motion degraded study.    LOWER CHEST: Coronary artery calcifications.    LIVER: Within normal limits.  BILE DUCTS: Normal caliber.  GALLBLADDER: Cholecystectomy.  SPLEEN: Within normal limits.  PANCREAS: Within normal limits.  ADRENALS: Within normal limits.  KIDNEYS/URETERS: Within normal limits.    BLADDER: Collapsed around a Blackman catheter.  REPRODUCTIVE ORGANS: Limited evaluation secondary to streak artifact from   right lower extremity hardware.    BOWEL: Percutaneous gastrostomy tube in place. No bowel obstruction.   Appendix not visualized.  PERITONEUM: No ascites.  VESSELS:  Atherosclerotic calcifications in the abdominal aorta and its   branches.  RETROPERITONEUM: No lymphadenopathy.    ABDOMINAL WALL: Anasarca. Left-sided sacral decubitus ulcer abutting the   sacrum without definite cortical erosion.  BONES: Right hip arthroplasty. Generalized osteopenia. Degenerative   changes. Complete collapse of L2 vertebral body, unchanged. Grade 1   anterolisthesis of L4 on L5. Chronic left acetabular fracture.    IMPRESSION:   Limited exam.    Left-sided sacral decubitus ulcer without definite cortical erosion of   the underlying sacrum. MRI would be more sensitive to evaluate for   osteomyelitis.    No acute intra-abdominal pathology or collection.    < end of copied text >  < from: CT Chest No Cont (03.01.19 @ 16:32) >    EXAM:  CT CHEST                            PROCEDURE DATE:  03/01/2019            INTERPRETATION:  Reason for Exam:  Fever    CT of the chest was performed from the thoracic inlet to the level of the   adrenal glands without contrast injection.    Comparison: December 31, 2018    Tubes/Lines:     Right-sided central line noted with its tip in the SVC.    Mediastinum/Vessels/Heart:     Aorta and pulmonary arteries are normal in   size. There is no pericardial effusion. No lymphadenopathy. Thyroid gland   is unremarkable    Lungs/Pleura/Airways: Diffuse and airway centered groundglass opacities   noted throughout both lungs. Trace left pleural effusion noted.    Visualized abdomen:     Unremarkable noncontrast appearance of the upper   abdomen    Bones and soft tissues:     No suspicious osseous lesions. Degenerative   changes noted throughout the spine.    IMPRESSION:    Patchy bilateral groundglass opacities, unchanged since 2016. No new   consolidations.    Incidental findings as above    < end of copied text >    RVP negative  Leg AG negative  PROBLEM LIST:    68yFemale with HEALTH ISSUES - PROBLEM Dx:  Stable OAD Sthma   FUO   PEG (percutaneous endoscopic gastrostomy) status: PEG (percutaneous endoscopic gastrostomy) status  03-08  asthma unspecified asthma severity, unspecified whether complicated, unspecified whether persistent: Asthma, unspecified asthma severity, unspecified whether complicated, unspecified whether persistent  History of pulmonary embolism: History of pulmonary embolism  Dementia without behavioral disturbance, unspecified dementia type: Dementia without behavioral disturbance, unspecified dementia type  Pressure injury of sacral region, unstageable: Pressure injury of sacral region, unstageable            RECS:  -Per ID d/cd antibx and antifungal   Rx uptated   Pulmonary Toilet  ID noted   culture blood urine  supportive care  -    Thank you for this consultation, please feel free to call with any questions [phone]  Carlito Francis DO [facility]

## 2019-03-09 NOTE — PROGRESS NOTE ADULT - SUBJECTIVE AND OBJECTIVE BOX
CC: f/u for fever    Patient reports: she is non verbal, febrile to 101.4 during night    REVIEW OF SYSTEMS:  All other review of systems negative (Comprehensive ROS)    Antimicrobials Day #  :off    Other Medications Reviewed  MEDICATIONS  (STANDING):  ALBUTerol    90 MICROgram(s) HFA Inhaler 1 Puff(s) Inhalation every 4 hours  ALBUTerol/ipratropium for Nebulization 3 milliLiter(s) Nebulizer every 6 hours  apixaban 5 milliGRAM(s) Oral every 12 hours  artificial  tears Solution 1 Drop(s) Both EYES every 6 hours  Biotene Dry Mouth Oral Rinse 5 milliLiter(s) Swish and Spit two times a day  clonazePAM Tablet 1.5 milliGRAM(s) Oral at bedtime  Dakins Solution - 1/4 Strength 1 Application(s) Topical two times a day  dextrose 5%. 1000 milliLiter(s) (50 mL/Hr) IV Continuous <Continuous>  dextrose 50% Injectable 12.5 Gram(s) IV Push once  dextrose 50% Injectable 25 Gram(s) IV Push once  dextrose 50% Injectable 25 Gram(s) IV Push once  dextrose 50% Injectable 25 Gram(s) IV Push once  famotidine    Tablet 20 milliGRAM(s) Oral daily  ferrous    sulfate Liquid 300 milliGRAM(s) Oral daily  gabapentin   Solution 300 milliGRAM(s) Oral two times a day  insulin lispro (HumaLOG) corrective regimen sliding scale   SubCutaneous every 6 hours  lisinopril 2.5 milliGRAM(s) Oral daily  predniSONE   Tablet 7.5 milliGRAM(s) Oral daily  QUEtiapine 25 milliGRAM(s) Oral at bedtime  tiotropium 18 MICROgram(s) Capsule 1 Capsule(s) Inhalation daily    T(F): 98 (03-09-19 @ 06:33), Max: 101.4 (03-09-19 @ 03:05)  HR: 75 (03-09-19 @ 06:33)  BP: 112/73 (03-09-19 @ 06:33)  RR: 18 (03-09-19 @ 06:33)  SpO2: 98% (03-09-19 @ 06:33)  Wt(kg): --    PHYSICAL EXAM:  General: lethargic, no acute distress  Eyes:  anicteric, no conjunctival injection, no discharge  Oropharynx: no lesions or injection 	  Neck: supple, without adenopathy  Lungs: clear to auscultation, poor inspiratory effert  Heart: regular rate and rhythm; no murmur, rubs or gallops  Abdomen: soft, nondistended, nontender, peg  Skin:scaral and Rt ischial decubitus as per wound care note  Extremities: no clubbing, cyanosis, or edema  Neurologic: sleepy, non varbal, poorly interactive    LAB RESULTS:                        8.6    15.10 )-----------( 536      ( 08 Mar 2019 10:06 )             30.2     03-08    141  |  102  |  34<H>  ----------------------------<  91  4.4   |  26  |  0.40<L>    Ca    8.7      08 Mar 2019 07:16    TPro  5.0<L>  /  Alb  2.4<L>  /  TBili  0.1<L>  /  DBili  x   /  AST  23  /  ALT  22  /  AlkPhos  113  03-08    LIVER FUNCTIONS - ( 08 Mar 2019 07:16 )  Alb: 2.4 g/dL / Pro: 5.0 g/dL / ALK PHOS: 113 U/L / ALT: 22 U/L / AST: 23 U/L / GGT: x             MICROBIOLOGY:  RECENT CULTURES:  03-07 @ 00:38 .Blood Blood-Peripheral     No growth to date.          RADIOLOGY REVIEWED:  < from: CT Abdomen and Pelvis w/ Oral Cont (03.02.19 @ 08:46) >  IMPRESSION:   Limited exam.    Left-sided sacral decubitus ulcer without definite cortical erosion of   the underlying sacrum. MRI would be more sensitive to evaluate for   osteomyelitis.    No acute intra-abdominal pathology or collection.    < end of copied text >

## 2019-03-09 NOTE — PROGRESS NOTE ADULT - SUBJECTIVE AND OBJECTIVE BOX
---___---___---___---___---___---___ ---___---___---___---___---___---___---___---___---___---                    <<<  M E D I C A L   A T T E N D I N G    F O L L O W    U P   N O T E  >>>  laying in bed no changes still with fever despite broad spectrum abx   ---___---___---___---___---___---      <<<  MEDICATIONS:  >>>    MEDICATIONS  (STANDING):  ALBUTerol    90 MICROgram(s) HFA Inhaler 1 Puff(s) Inhalation every 4 hours  ALBUTerol/ipratropium for Nebulization 3 milliLiter(s) Nebulizer every 6 hours  apixaban 5 milliGRAM(s) Oral every 12 hours  artificial  tears Solution 1 Drop(s) Both EYES every 6 hours  Biotene Dry Mouth Oral Rinse 5 milliLiter(s) Swish and Spit two times a day  clonazePAM Tablet 1.5 milliGRAM(s) Oral at bedtime  Dakins Solution - 1/4 Strength 1 Application(s) Topical two times a day  dextrose 5%. 1000 milliLiter(s) (50 mL/Hr) IV Continuous <Continuous>  dextrose 50% Injectable 12.5 Gram(s) IV Push once  dextrose 50% Injectable 25 Gram(s) IV Push once  dextrose 50% Injectable 25 Gram(s) IV Push once  famotidine    Tablet 20 milliGRAM(s) Oral daily  ferrous    sulfate Liquid 300 milliGRAM(s) Oral daily  gabapentin   Solution 300 milliGRAM(s) Oral two times a day  insulin lispro (HumaLOG) corrective regimen sliding scale   SubCutaneous every 6 hours  lisinopril 2.5 milliGRAM(s) Oral daily  predniSONE   Tablet 7.5 milliGRAM(s) Oral daily  QUEtiapine 25 milliGRAM(s) Oral at bedtime  tiotropium 18 MICROgram(s) Capsule 1 Capsule(s) Inhalation daily      MEDICATIONS  (PRN):  acetaminophen    Suspension .. 650 milliGRAM(s) Oral every 6 hours PRN Temp greater or equal to 38C (100.4F)  dextrose 40% Gel 15 Gram(s) Oral once PRN Blood Glucose LESS THAN 70 milliGRAM(s)/deciliter  glucagon  Injectable 1 milliGRAM(s) IntraMuscular once PRN Glucose LESS THAN 70 milligrams/deciliter  sodium chloride 0.65% Nasal 1 Spray(s) Both Nostrils two times a day PRN Nasal Congestion       ---___---___---___---___---___---     <<<REVIEW OF SYSTEM: >>>  unable to obtain      ---___---___---___---___---___---          <<<  VITAL SIGNS: >>>    T(F): 99.1 (03-09-19 @ 11:29), Max: 101.4 (03-09-19 @ 03:05)  HR: 104 (03-09-19 @ 11:29) (75 - 122)  BP: 102/64 (03-09-19 @ 11:29) (81/43 - 123/75)  RR: 18 (03-09-19 @ 11:29) (18 - 22)  SpO2: 99% (03-09-19 @ 11:29) (96% - 100%)  Wt(kg): --  CAPILLARY BLOOD GLUCOSE      POCT Blood Glucose.: 217 mg/dL (09 Mar 2019 11:33)    I&O's Summary    08 Mar 2019 07:01  -  09 Mar 2019 07:00  --------------------------------------------------------  IN: 0 mL / OUT: 600 mL / NET: -600 mL         ---___---___---___---___---___---                       PHYSICAL EXAM:    GEN: A&O X 0 , NAD , comfortable  HEENT: NCAT, PERRL, MMM, no scleral icterus, hearing intact  NECK: Supple, No JVD  CVS: S1S2 , regular , No M/R/G appreciated  PULM: CTA B/L,  no W/R/R appreciated  ABD.: soft. non tender, non distended,  bowel sounds present peg noted   Extrem: intact pulses , no edema noted  Derm: No rash or ecchymosis noted sacral decubitus noted         ---___---___---___---___---___---     <<<  LAB AND IMAGING: >>>                          9.0    18.49 )-----------( 487      ( 09 Mar 2019 10:28 )             30.3               03-09    139  |  99  |  42<H>  ----------------------------<  105<H>  4.4   |  23  |  0.48<L>    Ca    8.9      09 Mar 2019 07:26    TPro  5.5<L>  /  Alb  2.9<L>  /  TBili  0.2  /  DBili  x   /  AST  20  /  ALT  20  /  AlkPhos  123<H>  03-09                       ABG - ( 07 Mar 2019 13:35 )  pH, Arterial: 7.48  pH, Blood: x     /  pCO2: 36    /  pO2: 76    / HCO3: 26    / Base Excess: 3.3   /  SaO2: 97                      [All pertinent / recent available Imaging reports and other labs reviewed]     ---___---___---___---___---___---___ ---___---___---___---___---           <<<  A S S E S S M E N T   A N D   P L A N :  >>>    sepsis  id [facility] appreciated   possible fuo  consider hematology consult   asthma  pulmonary following continu current treatment  h/o pe on eliquis   agitation  on seroquel and klonopin   lumbar compression fracture  medcial marijuana for pain   chf 20 % ef on lisinopril   dementia supportive care         -GI/DVT Prophylaxis.    --------------------------------------------  Case discussed with   Education given on     >>______________________<<      Deniz Bolden .         phone   [phone]

## 2019-03-10 LAB
GLUCOSE BLDC GLUCOMTR-MCNC: 109 MG/DL — HIGH (ref 70–99)
GLUCOSE BLDC GLUCOMTR-MCNC: 128 MG/DL — HIGH (ref 70–99)
GLUCOSE BLDC GLUCOMTR-MCNC: 211 MG/DL — HIGH (ref 70–99)
HCT VFR BLD CALC: 25.8 % — LOW (ref 34.5–45)
HGB BLD-MCNC: 8 G/DL — LOW (ref 11.5–15.5)
MCHC RBC-ENTMCNC: 29.7 PG — SIGNIFICANT CHANGE UP (ref 27–34)
MCHC RBC-ENTMCNC: 31 GM/DL — LOW (ref 32–36)
MCV RBC AUTO: 95.9 FL — SIGNIFICANT CHANGE UP (ref 80–100)
PLATELET # BLD AUTO: 463 K/UL — HIGH (ref 150–400)
RBC # BLD: 2.69 M/UL — LOW (ref 3.8–5.2)
RBC # FLD: 15.1 % — HIGH (ref 10.3–14.5)
WBC # BLD: 19.01 K/UL — HIGH (ref 3.8–10.5)
WBC # FLD AUTO: 19.01 K/UL — HIGH (ref 3.8–10.5)

## 2019-03-10 RX ORDER — ACETAMINOPHEN 500 MG
500 TABLET ORAL ONCE
Qty: 0 | Refills: 0 | Status: COMPLETED | OUTPATIENT
Start: 2019-03-10 | End: 2019-03-10

## 2019-03-10 RX ADMIN — QUETIAPINE FUMARATE 25 MILLIGRAM(S): 200 TABLET, FILM COATED ORAL at 21:35

## 2019-03-10 RX ADMIN — Medication 7.5 MILLIGRAM(S): at 06:30

## 2019-03-10 RX ADMIN — Medication 1 DROP(S): at 00:00

## 2019-03-10 RX ADMIN — Medication 650 MILLIGRAM(S): at 21:47

## 2019-03-10 RX ADMIN — Medication 3 MILLILITER(S): at 11:07

## 2019-03-10 RX ADMIN — Medication 2: at 12:25

## 2019-03-10 RX ADMIN — Medication 200 MILLIGRAM(S): at 06:59

## 2019-03-10 RX ADMIN — Medication 1 DROP(S): at 06:31

## 2019-03-10 RX ADMIN — Medication 1 APPLICATION(S): at 17:14

## 2019-03-10 RX ADMIN — Medication 1 DROP(S): at 11:07

## 2019-03-10 RX ADMIN — Medication 3 MILLILITER(S): at 18:21

## 2019-03-10 RX ADMIN — APIXABAN 5 MILLIGRAM(S): 2.5 TABLET, FILM COATED ORAL at 21:35

## 2019-03-10 RX ADMIN — Medication 1.5 MILLIGRAM(S): at 21:35

## 2019-03-10 RX ADMIN — GABAPENTIN 300 MILLIGRAM(S): 400 CAPSULE ORAL at 21:34

## 2019-03-10 RX ADMIN — Medication 1 DROP(S): at 18:21

## 2019-03-10 RX ADMIN — Medication 300 MILLIGRAM(S): at 11:06

## 2019-03-10 RX ADMIN — APIXABAN 5 MILLIGRAM(S): 2.5 TABLET, FILM COATED ORAL at 08:35

## 2019-03-10 RX ADMIN — Medication 5 MILLILITER(S): at 18:21

## 2019-03-10 RX ADMIN — Medication 1 APPLICATION(S): at 06:30

## 2019-03-10 RX ADMIN — Medication 5 MILLILITER(S): at 06:31

## 2019-03-10 RX ADMIN — GABAPENTIN 300 MILLIGRAM(S): 400 CAPSULE ORAL at 11:05

## 2019-03-10 RX ADMIN — Medication 500 MILLIGRAM(S): at 06:59

## 2019-03-10 RX ADMIN — FAMOTIDINE 20 MILLIGRAM(S): 10 INJECTION INTRAVENOUS at 11:07

## 2019-03-10 NOTE — PROGRESS NOTE ADULT - SUBJECTIVE AND OBJECTIVE BOX
CC: f/u for fever    Patient reports: she is non verbal, in fetal position, has had daily fevers to 101-102 range    REVIEW OF SYSTEMS:  All other review of systems negative (Comprehensive ROS)    Antimicrobials Day #  :off    Other Medications Reviewed    T(F): 101 (03-10-19 @ 06:30), Max: 101.8 (03-09-19 @ 21:04)  HR: 106 (03-10-19 @ 05:53)  BP: 92/56 (03-10-19 @ 05:53)  RR: 19 (03-10-19 @ 05:53)  SpO2: 98% (03-10-19 @ 05:53)  Wt(kg): --    PHYSICAL EXAM:  General: lethargic, no acute distress  Eyes:  anicteric, no conjunctival injection, no discharge  Oropharynx: no lesions or injection 	  Neck: supple, without adenopathy  Lungs: clear to auscultation  Heart: regular rate and rhythm; no murmur, rubs or gallops  Abdomen: soft, nondistended, nontender, peg  Skin: no lesions  Extremities: no clubbing, cyanosis, or edema  Neurologic: poorly interactive    LAB RESULTS:                        9.0    18.49 )-----------( 487      ( 09 Mar 2019 10:28 )             30.3     03-09    139  |  99  |  42<H>  ----------------------------<  105<H>  4.4   |  23  |  0.48<L>    Ca    8.9      09 Mar 2019 07:26    TPro  5.5<L>  /  Alb  2.9<L>  /  TBili  0.2  /  DBili  x   /  AST  20  /  ALT  20  /  AlkPhos  123<H>  03-09    LIVER FUNCTIONS - ( 09 Mar 2019 07:26 )  Alb: 2.9 g/dL / Pro: 5.5 g/dL / ALK PHOS: 123 U/L / ALT: 20 U/L / AST: 20 U/L / GGT: x             MICROBIOLOGY:  RECENT CULTURES:  03-07 @ 00:38 .Blood Blood-Peripheral     No growth to date.          RADIOLOGY REVIEWED:

## 2019-03-10 NOTE — PROGRESS NOTE ADULT - ASSESSMENT
_________________________________________________________________________________________  ========>>  M E D I C A L   A T T E N D I N G  (covering)   F O L L O W  U P  N O T E  <<=========  -----------------------------------------------------------------------------------------------------    - Patient seen and examined by me earlier today.   - In summary,  MYRIAM HEATH is a 68y year old woman who originally presented with fever  - Patient today overall doing fairly, but still spiking fever , comfortable, eating fairly     ==================>> REVIEW OF SYSTEM <<=================    limited ROS as pt not verbal     ==================>> PHYSICAL EXAM <<=================    GEN: awake, alert , NAD , comfortable in bed   HEENT: NCAT, PERRL, MMM, hearing intact  Neck: supple , no JVD  CVS: S1S2 , regular , No M/R/G appreciated  PULM: CTA B/L,  no W/R/R appreciated  ABD.: soft. non tender, non distended,  bowel sounds present  Extrem: intact pulses , no edema   PSYCH : normal mood,  not anxious       ==================>> MEDICATIONS <<====================    ALBUTerol    90 MICROgram(s) HFA Inhaler 1 Puff(s) Inhalation every 4 hours  ALBUTerol/ipratropium for Nebulization 3 milliLiter(s) Nebulizer every 6 hours  apixaban 5 milliGRAM(s) Oral every 12 hours  artificial  tears Solution 1 Drop(s) Both EYES every 6 hours  Biotene Dry Mouth Oral Rinse 5 milliLiter(s) Swish and Spit two times a day  clonazePAM Tablet 1.5 milliGRAM(s) Oral at bedtime  Dakins Solution - 1/4 Strength 1 Application(s) Topical two times a day  dextrose 5%. 1000 milliLiter(s) IV Continuous <Continuous>  dextrose 50% Injectable 12.5 Gram(s) IV Push once  dextrose 50% Injectable 25 Gram(s) IV Push once  dextrose 50% Injectable 25 Gram(s) IV Push once  famotidine    Tablet 20 milliGRAM(s) Oral daily  ferrous    sulfate Liquid 300 milliGRAM(s) Oral daily  gabapentin   Solution 300 milliGRAM(s) Oral two times a day  insulin lispro (HumaLOG) corrective regimen sliding scale   SubCutaneous every 6 hours  lisinopril 2.5 milliGRAM(s) Oral daily  predniSONE   Tablet 7.5 milliGRAM(s) Oral daily  QUEtiapine 25 milliGRAM(s) Oral at bedtime  tiotropium 18 MICROgram(s) Capsule 1 Capsule(s) Inhalation daily    MEDICATIONS  (PRN):  acetaminophen    Suspension .. 650 milliGRAM(s) Oral every 6 hours PRN Temp greater or equal to 38C (100.4F)  dextrose 40% Gel 15 Gram(s) Oral once PRN Blood Glucose LESS THAN 70 milliGRAM(s)/deciliter  glucagon  Injectable 1 milliGRAM(s) IntraMuscular once PRN Glucose LESS THAN 70 milligrams/deciliter  sodium chloride 0.65% Nasal 1 Spray(s) Both Nostrils two times a day PRN Nasal Congestion    ==================>> VITAL SIGNS <<==================    Vital Signs Last 24 Hrs  T(C): 36.8 (03-10-19 @ 11:23)  T(F): 98.3 (03-10-19 @ 11:23), Max: 101.8 (03-09-19 @ 21:04)  HR: 102 (03-10-19 @ 11:22) (102 - 115)  BP: 107/70 (03-10-19 @ 11:22)  RR: 22 (03-10-19 @ 11:22) (18 - 22)  SpO2: 96% (03-10-19 @ 11:22) (96% - 98%)      POCT Blood Glucose.: 211 mg/dL (10 Mar 2019 11:57)  POCT Blood Glucose.: 128 mg/dL (10 Mar 2019 05:44)  POCT Blood Glucose.: 112 mg/dL (09 Mar 2019 23:49)  POCT Blood Glucose.: 122 mg/dL (09 Mar 2019 18:15)     ==================>> LAB AND IMAGING <<==================                        8.0    19.01 )-----------( 463      ( 10 Mar 2019 07:56 )             25.8        139  |  99  |  42<H>  ----------------------------<  105<H>  4.4   |  23  |  0.48<L>    Ca    8.9      09 Mar 2019 07:26    TPro  5.5<L>  /  Alb  2.9<L>  /  TBili  0.2  /  DBili  x   /  AST  20  /  ALT  20  /  AlkPhos  123<H>  03-09    WBC count:   19.01 <<== ,  18.49 <<== ,  15.10 <<== ,  14.99 <<== ,  13.87 <<==   Hemoglobin:   8.0 <<==,  9.0 <<==,  8.6 <<==,  10.2 <<==,  8.8 <<==  platelets:  463 <==, 487 <==, 536 <==, 562 <==, 451 <==, 468 <==    Creatinine:  0.48  <<==, 0.40  <<==, 0.43  <<==, 0.39  <<==, 0.45  <<==  Sodium:   139  <==, 141  <==, 139  <==, 141  <==, 145  <==       AST:          20 <== , 23 <==      ALT:        20  <== , 22  <==      AP:        123  <=, 113  <=     Bili:        0.2  <=, 0.1  <=    _______________________  C U L T U R E S :    Culture - Blood (collected 07 Mar 2019 00:38)  Source: .Blood Blood-Peripheral  Preliminary Report (08 Mar 2019 01:01):    No growth to date.    Culture - Blood (collected 07 Mar 2019 00:38)  Source: .Blood Blood-Peripheral  Preliminary Report (08 Mar 2019 01:01):    No growth to date.    Culture - Blood (collected 03 Mar 2019 17:14)  Source: .Blood Blood-Peripheral  Final Report (08 Mar 2019 18:00):    No growth at 5 days.      ___________________________________________________________________________________  ===============>>  A S S E S S M E N T   A N D   P L A N <<===============  ------------------------------------------------------------------------------------------    fever , unclear etiology  sepsis >> FUO, post courses of broad spectrum Abx      id appreciated: awaiting repeat cultures      D/w family : will order Indium scan        son also concerned for dental infection given implants.. ( if negative Indium: would consider dental eval as well )   monitor off abx  aspiration precautions  turn and position  DVT PPx ( on Eliquis)     chf : low dose lisinopril   dementia agitation  continue current medication    -GI/DVT Prophylaxis.    --------------------------------------------  Case discussed with family at bedside,  Education given on findings and plan of care  ___________________________  H. RORY Pacheco. (covering)   Pager: [phone]

## 2019-03-10 NOTE — CHART NOTE - NSCHARTNOTEFT_GEN_A_CORE
Nutrition Follow Up Note  Nutrition consult received for " pt FS running low, pt gets Glucerna bolus PEG feeds, NP asked for nutrition consult".      "68y F with PMH of HFrEF (EF 20%), advanced dementia, bedbound with stage 4 sacral and R hip decubitus ulcer c/b R hip MRSA infection s/p PEG and chronic gavin, presumed asthma on steroids, multiple recent prolonged hospitalizations, presented to the hospital on  with fevers to 103 as well as cough and loose BMs, found to be septic likely 2/2 UTI vs sacral ulcer vs PNA, course c/b septic shock with SBP 60's now on norepinephrine. Pt noted with hypoglycemia yesterday.    Source: , RN    Diet : NPO/Enteral: Glucerna 1.2 @ 240mL q6hrs + 2 Prosource daily    Patient falling asleep during RD visit.  states in the past Pt was unable to tolerate Vital, normally has 2 BMs daily at home but states she has more here because she is incontinent and when they move her she has a BM. States Pt does not c/o pain or nausea/vomiting. Per , unsure if Pt gets bolus feeds right after fingerstick/insulin administration.        Enteral /Parenteral Nutrition: Glucerna + 2 Prosource packets provide 1272kcal, 87.6g protein (31.2kcal/kg, 2.1g protein/kg/current wt)      Daily Weight in k.8 (-), Weight in k.8 (-), Weight in k.6 (-)  Admission Wt: 35.8 kg  Pt gained weight now plateauing.    Pertinent Medications: MEDICATIONS  (STANDING):  ALBUTerol    90 MICROgram(s) HFA Inhaler 1 Puff(s) Inhalation every 4 hours  ALBUTerol/ipratropium for Nebulization 3 milliLiter(s) Nebulizer every 6 hours  apixaban 5 milliGRAM(s) Oral every 12 hours  artificial  tears Solution 1 Drop(s) Both EYES every 6 hours  Biotene Dry Mouth Oral Rinse 5 milliLiter(s) Swish and Spit two times a day  clonazePAM Tablet 1.5 milliGRAM(s) Oral at bedtime  Dakins Solution - 1/4 Strength 1 Application(s) Topical two times a day  dextrose 5%. 1000 milliLiter(s) (50 mL/Hr) IV Continuous <Continuous>  dextrose 50% Injectable 12.5 Gram(s) IV Push once  dextrose 50% Injectable 25 Gram(s) IV Push once  dextrose 50% Injectable 25 Gram(s) IV Push once  famotidine    Tablet 20 milliGRAM(s) Oral daily  ferrous    sulfate Liquid 300 milliGRAM(s) Oral daily  gabapentin   Solution 300 milliGRAM(s) Oral two times a day  insulin lispro (HumaLOG) corrective regimen sliding scale   SubCutaneous every 6 hours  lisinopril 2.5 milliGRAM(s) Oral daily  predniSONE   Tablet 7.5 milliGRAM(s) Oral daily  QUEtiapine 25 milliGRAM(s) Oral at bedtime  tiotropium 18 MICROgram(s) Capsule 1 Capsule(s) Inhalation daily    MEDICATIONS  (PRN):  acetaminophen    Suspension .. 650 milliGRAM(s) Oral every 6 hours PRN Temp greater or equal to 38C (100.4F)  dextrose 40% Gel 15 Gram(s) Oral once PRN Blood Glucose LESS THAN 70 milliGRAM(s)/deciliter  glucagon  Injectable 1 milliGRAM(s) IntraMuscular once PRN Glucose LESS THAN 70 milligrams/deciliter  sodium chloride 0.65% Nasal 1 Spray(s) Both Nostrils two times a day PRN Nasal Congestion    Pertinent Labs:   Finger Sticks:  POCT Blood Glucose.: 128 mg/dL (03-10 @ 05:44)  POCT Blood Glucose.: 112 mg/dL ( @ 23:49)  POCT Blood Glucose.: 122 mg/dL ( @ 18:15)  POCT Blood Glucose.: 217 mg/dL ( @ 11:33)      Skin per nursing documentation: stage 2 pressure ulcers on spine and upper sacrum, stage 3 pressure ulcer on R hip, stage 4 pressure ulcer on sacrum.  Edema: 1+ generalized.    Estimated Needs:   [ ] no change since previous assessment  [x] recalculated: using current Wt 40.8 kg  30-35kcal/k1415-5026 kcal  2.2-2.4g protein/k-98g protein    Previous Nutrition Diagnosis: Severe malnutrition  Nutrition Diagnosis is: ongoing, to be addressed with increased TF regimen.    New Nutrition Diagnosis: N/A     Interventions:     Recommend  1) Increase bolus regimen to 270cc q 6hrs + 2 NoCarb Prosource daily. Goal provides 1416 kcal, 95 g protein (34.7 kcal/kg, 2.3g protein/kg)  2) Reinforced with RN to have bolus regimen coincide with finger stick schedule: 12am, 6am, 12pm, 6pm.  3) Continue DanActive twice daily per protocol    Monitoring and Evaluation:     Continue to monitor Nutritional intake, Tolerance to diet prescription, weights, labs, skin integrity    RD remains available upon request and will follow up per protocol    Myles Stephens RD, CDN, CDE. Pager: [phone]

## 2019-03-10 NOTE — PROGRESS NOTE ADULT - ASSESSMENT
67 yo F with multiple medical conditions admitted 2/26 with fever to 103.  She has spent the greater part of past 9 months in hospital.  Prior MRSA bacteremia, infected R THR, controlled after R hip GABRIEL  S/p multiple courses for pneumonia, stage 4 decubitus, G tube, and chronic Blackman  No obvious primary site for infection- urine culture with enterobacter and VRE, likely colonizers  She also has a chronic DVT and had unexplained fevers x 6 weeks during last hospital stay  1/4 bottles with a coag negative staph, likely a contaminant. CDT negative  CT: patchy b/l groundglass opacities, no new consolidations, no acute abd pathology.  Her chest CT scan is unchanged c/w 2016, hence no evidence of an acute or subacute infection.  R hip is without collection and sacral decubitus is chronic.  We may be confronted with approaching her as a FUO as she did not respond to Meropenem, tigecycline, andrecent  empiric dapto and micafungin.  Repeat cultures negative  RVP negative  Leg Ag negative  Head CT without acute process  Meropenem stopped 3/6, s/p 8 days   Dapto and Micafungin- stopped 3/8  Could be faced with aspiration events, poor clearance of secretions  Could be faced with central temp dysregulation  Major question is whether any additional fever w/u will be helpful  Suggest:  Approach as FUO, hope to avoid further empiric abx  Re culture as necessary  she appears at her baseline  We have not been able to find infectious explanation for fever.drug fever and central temp dysregulation of concern   sleeping soundly and not awakened.

## 2019-03-11 LAB
ANION GAP SERPL CALC-SCNC: 15 MMOL/L — SIGNIFICANT CHANGE UP (ref 5–17)
BUN SERPL-MCNC: 36 MG/DL — HIGH (ref 7–23)
CALCIUM SERPL-MCNC: 9.1 MG/DL — SIGNIFICANT CHANGE UP (ref 8.4–10.5)
CHLORIDE SERPL-SCNC: 96 MMOL/L — SIGNIFICANT CHANGE UP (ref 96–108)
CO2 SERPL-SCNC: 24 MMOL/L — SIGNIFICANT CHANGE UP (ref 22–31)
CREAT SERPL-MCNC: 0.34 MG/DL — LOW (ref 0.5–1.3)
GLUCOSE BLDC GLUCOMTR-MCNC: 101 MG/DL — HIGH (ref 70–99)
GLUCOSE BLDC GLUCOMTR-MCNC: 107 MG/DL — HIGH (ref 70–99)
GLUCOSE BLDC GLUCOMTR-MCNC: 112 MG/DL — HIGH (ref 70–99)
GLUCOSE BLDC GLUCOMTR-MCNC: 25 MG/DL — CRITICAL LOW (ref 70–99)
GLUCOSE BLDC GLUCOMTR-MCNC: 29 MG/DL — CRITICAL LOW (ref 70–99)
GLUCOSE BLDC GLUCOMTR-MCNC: 34 MG/DL — CRITICAL LOW (ref 70–99)
GLUCOSE BLDC GLUCOMTR-MCNC: 35 MG/DL — CRITICAL LOW (ref 70–99)
GLUCOSE BLDC GLUCOMTR-MCNC: 36 MG/DL — CRITICAL LOW (ref 70–99)
GLUCOSE BLDC GLUCOMTR-MCNC: 46 MG/DL — LOW (ref 70–99)
GLUCOSE BLDC GLUCOMTR-MCNC: 48 MG/DL — LOW (ref 70–99)
GLUCOSE BLDC GLUCOMTR-MCNC: 51 MG/DL — LOW (ref 70–99)
GLUCOSE BLDC GLUCOMTR-MCNC: 53 MG/DL — LOW (ref 70–99)
GLUCOSE BLDC GLUCOMTR-MCNC: 53 MG/DL — LOW (ref 70–99)
GLUCOSE BLDC GLUCOMTR-MCNC: 55 MG/DL — LOW (ref 70–99)
GLUCOSE BLDC GLUCOMTR-MCNC: 57 MG/DL — LOW (ref 70–99)
GLUCOSE BLDC GLUCOMTR-MCNC: 59 MG/DL — LOW (ref 70–99)
GLUCOSE BLDC GLUCOMTR-MCNC: 92 MG/DL — SIGNIFICANT CHANGE UP (ref 70–99)
GLUCOSE BLDC GLUCOMTR-MCNC: 95 MG/DL — SIGNIFICANT CHANGE UP (ref 70–99)
GLUCOSE SERPL-MCNC: 95 MG/DL — SIGNIFICANT CHANGE UP (ref 70–99)
HCT VFR BLD CALC: 28.3 % — LOW (ref 34.5–45)
HGB BLD-MCNC: 8.3 G/DL — LOW (ref 11.5–15.5)
MCHC RBC-ENTMCNC: 29.3 GM/DL — LOW (ref 32–36)
MCHC RBC-ENTMCNC: 29.4 PG — SIGNIFICANT CHANGE UP (ref 27–34)
MCV RBC AUTO: 100.4 FL — HIGH (ref 80–100)
PLATELET # BLD AUTO: 432 K/UL — HIGH (ref 150–400)
POTASSIUM SERPL-MCNC: 4.4 MMOL/L — SIGNIFICANT CHANGE UP (ref 3.5–5.3)
POTASSIUM SERPL-SCNC: 4.4 MMOL/L — SIGNIFICANT CHANGE UP (ref 3.5–5.3)
RBC # BLD: 2.82 M/UL — LOW (ref 3.8–5.2)
RBC # FLD: 15.3 % — HIGH (ref 10.3–14.5)
SODIUM SERPL-SCNC: 135 MMOL/L — SIGNIFICANT CHANGE UP (ref 135–145)
WBC # BLD: 20.21 K/UL — HIGH (ref 3.8–10.5)
WBC # FLD AUTO: 20.21 K/UL — HIGH (ref 3.8–10.5)

## 2019-03-11 RX ORDER — DEXTROSE 50 % IN WATER 50 %
25 SYRINGE (ML) INTRAVENOUS ONCE
Qty: 0 | Refills: 0 | Status: COMPLETED | OUTPATIENT
Start: 2019-03-11 | End: 2019-03-11

## 2019-03-11 RX ORDER — DEXTROSE 50 % IN WATER 50 %
12.5 SYRINGE (ML) INTRAVENOUS ONCE
Qty: 0 | Refills: 0 | Status: COMPLETED | OUTPATIENT
Start: 2019-03-11 | End: 2019-03-11

## 2019-03-11 RX ADMIN — Medication 3 MILLILITER(S): at 00:38

## 2019-03-11 RX ADMIN — APIXABAN 5 MILLIGRAM(S): 2.5 TABLET, FILM COATED ORAL at 20:37

## 2019-03-11 RX ADMIN — Medication 7.5 MILLIGRAM(S): at 06:22

## 2019-03-11 RX ADMIN — Medication 25 GRAM(S): at 18:40

## 2019-03-11 RX ADMIN — APIXABAN 5 MILLIGRAM(S): 2.5 TABLET, FILM COATED ORAL at 09:47

## 2019-03-11 RX ADMIN — Medication 12.5 GRAM(S): at 23:23

## 2019-03-11 RX ADMIN — Medication 1 DROP(S): at 23:23

## 2019-03-11 RX ADMIN — Medication 1 DROP(S): at 06:23

## 2019-03-11 RX ADMIN — Medication 1 DROP(S): at 00:38

## 2019-03-11 RX ADMIN — Medication 3 MILLILITER(S): at 06:23

## 2019-03-11 RX ADMIN — Medication 650 MILLIGRAM(S): at 20:37

## 2019-03-11 RX ADMIN — Medication 1 DROP(S): at 13:36

## 2019-03-11 RX ADMIN — GABAPENTIN 300 MILLIGRAM(S): 400 CAPSULE ORAL at 10:18

## 2019-03-11 RX ADMIN — Medication 300 MILLIGRAM(S): at 13:35

## 2019-03-11 RX ADMIN — Medication 5 MILLILITER(S): at 06:24

## 2019-03-11 RX ADMIN — Medication 650 MILLIGRAM(S): at 22:00

## 2019-03-11 RX ADMIN — Medication 25 GRAM(S): at 06:22

## 2019-03-11 RX ADMIN — Medication 1.5 MILLIGRAM(S): at 22:16

## 2019-03-11 RX ADMIN — QUETIAPINE FUMARATE 25 MILLIGRAM(S): 200 TABLET, FILM COATED ORAL at 22:16

## 2019-03-11 RX ADMIN — FAMOTIDINE 20 MILLIGRAM(S): 10 INJECTION INTRAVENOUS at 13:35

## 2019-03-11 RX ADMIN — Medication 12.5 GRAM(S): at 07:21

## 2019-03-11 RX ADMIN — Medication 1 APPLICATION(S): at 06:24

## 2019-03-11 RX ADMIN — Medication 1 DROP(S): at 18:45

## 2019-03-11 RX ADMIN — Medication 3 MILLILITER(S): at 23:23

## 2019-03-11 RX ADMIN — Medication 5 MILLILITER(S): at 18:45

## 2019-03-11 RX ADMIN — GABAPENTIN 300 MILLIGRAM(S): 400 CAPSULE ORAL at 22:16

## 2019-03-11 RX ADMIN — LISINOPRIL 2.5 MILLIGRAM(S): 2.5 TABLET ORAL at 06:23

## 2019-03-11 NOTE — PROGRESS NOTE ADULT - SUBJECTIVE AND OBJECTIVE BOX
CC: f/u for  fever, leukocytosis  Patient reports nothing intelligible    REVIEW OF SYSTEMS:  All other review of systems cannot get(Comprehensive ROS)    Antimicrobials Day #  :    Other Medications Reviewed    T(F): 98.3 (03-11-19 @ 12:45), Max: 101.2 (03-10-19 @ 22:40)  HR: 114 (03-11-19 @ 13:01)  BP: 114/92 (03-11-19 @ 13:01)  RR: 19 (03-11-19 @ 13:01)  SpO2: 95% (03-11-19 @ 04:42)  Wt(kg): --    PHYSICAL EXAM:  General: awake, lying in fetal position, no acute distress  Eyes:  anicteric, no conjunctival injection, no discharge  Oropharynx: no lesions or injection 	no gum swelling  Neck: supple, without adenopathy  Lungs: clear to auscultation  Heart: regular rate and rhythm; no murmur, rubs or gallops  Abdomen: soft, nondistended, nontender, without mass or organomegaly, peg  Skin: no lesions  Extremities: no clubbing, cyanosis, or edema,  no leg wounds, back with very open deep decubitus that is clean  Neurologic: awake, not intelligibly verbal    LAB RESULTS:                        8.3    20.21 )-----------( 432      ( 11 Mar 2019 08:09 )             28.3     03-11    135  |  96  |  36<H>  ----------------------------<  95  4.4   |  24  |  0.34<L>    Ca    9.1      11 Mar 2019 06:18          MICROBIOLOGY:  RECENT CULTURES:  03-10 @ 10:06 .Blood Blood     No growth to date.      03-07 @ 00:38 .Blood Blood-Peripheral     No growth to date.          RADIOLOGY REVIEWED:  < from: CT Head No Cont (03.04.19 @ 22:39) >  IMPRESSION:    No acute intracranial hemorrhage, mass effect, vasogenic edema, or   evidence of acute territorial infarct.    Slight interval enlargement of the ventricles and sulci, consistent with   progressive atrophy.    Slightly increased nonspecific periventricular lucency may represent   white matter microvascular ischemic disease.    < from: CT Abdomen and Pelvis w/ Oral Cont (03.02.19 @ 08:46) >  EXAM:  CT ABDOMEN AND PELVIS OC                            PROCEDURE DATE:  03/02/2019            INTERPRETATION:  CLINICAL INFORMATION: Fever and leukocytosis, pressure   ulcer. Evaluate for source of infection.         COMPARISON: CT abdomen and pelvis 12/22/2018, 8/27/2018.    PROCEDURE:   CT of the Abdomen and Pelvis was performed without intravenous contrast.   Intravenous contrast: None.  Oral contrast: positive contrast was administered.  Sagittal and coronal reformats were performed.    FINDINGS:    Limited evaluation of the solid visceral organs and vasculature without   intravenous contrast. Slightly motion degraded study.    LOWER CHEST: Coronary artery calcifications.    LIVER: Within normal limits.  BILE DUCTS: Normal caliber.  GALLBLADDER: Cholecystectomy.  SPLEEN: Within normal limits.  PANCREAS: Within normal limits.  ADRENALS: Within normal limits.  KIDNEYS/URETERS: Within normal limits.    BLADDER: Collapsed around a Blackman catheter.  REPRODUCTIVE ORGANS: Limited evaluation secondary to streak artifact from   right lower extremity hardware.    BOWEL: Percutaneous gastrostomy tube in place. No bowel obstruction.   Appendix not visualized.  PERITONEUM: No ascites.  VESSELS:  Atherosclerotic calcifications in the abdominal aorta and its   branches.  RETROPERITONEUM: No lymphadenopathy.    ABDOMINAL WALL: Anasarca. Left-sided sacral decubitus ulcer abutting the   sacrum without definite cortical erosion.  BONES: Right hip arthroplasty. Generalized osteopenia. Degenerative   changes. Complete collapse of L2 vertebral body, unchanged. Grade 1   anterolisthesis of L4 on L5. Chronic left acetabular fracture.    IMPRESSION:   Limited exam.    Left-sided sacral decubitus ulcer without definite cortical erosion of   the underlying sacrum. MRI would be more sensitive to evaluate for   osteomyelitis.    No acute intra-abdominal pathology or collection.    < end of copied text >    < end of copied text >      < from: CT Chest No Cont (03.01.19 @ 16:32) >  EXAM:  CT CHEST                            PROCEDURE DATE:  03/01/2019            INTERPRETATION:  Reason for Exam:  Fever    CT of the chest was performed from the thoracic inlet to the level of the   adrenal glands without contrast injection.    Comparison: December 31, 2018    Tubes/Lines:     Right-sided central line noted with its tip in the SVC.    Mediastinum/Vessels/Heart:     Aorta and pulmonary arteries are normal in   size. There is no pericardial effusion. No lymphadenopathy. Thyroid gland   is unremarkable    Lungs/Pleura/Airways: Diffuse and airway centered groundglass opacities   noted throughout both lungs. Trace left pleural effusion noted.    Visualized abdomen:     Unremarkable noncontrast appearance of the upper   abdomen    Bones and soft tissues:     No suspicious osseous lesions. Degenerative   changes noted throughout the spine.    IMPRESSION:    Patchy bilateral groundglass opacities, unchanged since 2016. No new   consolidations.    Incidental findings as above    < end of copied text >          Assessment:  Patient with dementia, had rthr about a year ago then returned soon after with severe sepsis, mrsa bacteremia and hip infection, leisa, spacer with prolonged post op course , prolonged stay and then multiple readmits for fever, had been tx for dvt, uti, now came back with shock , leukemoid reaction, got empiric broad spectrum antimicrobics but to date fever and leukocytosis persist though has been hemodynamically stable. exam unrevealing for infection and imaging is too. She has a deep decubitus that could be driving the fever and leukocytosis but antibiotics will not have any sustained affect on this.   Plan:  monitor off antibiotics  await planned indium scan  will check ferritin and lap for possible stills or mpd.   r/w dr sneed  next step if no finding would be more invasive procedures for fuo dx and doubt she would tolerate them nor would she have overall improved function if we did find something given advanced dementia.

## 2019-03-11 NOTE — PROGRESS NOTE ADULT - SUBJECTIVE AND OBJECTIVE BOX
---___---___---___---___---___---___ ---___---___---___---___---___---___---___---___---___---                    <<<  M E D I C A L   A T T E N D I N G    F O L L O W    U P   N O T E  >>>    patient doing well no distress but white count    ---___---___---___---___---___---      <<<  MEDICATIONS:  >>>    MEDICATIONS  (STANDING):  ALBUTerol    90 MICROgram(s) HFA Inhaler 1 Puff(s) Inhalation every 4 hours  ALBUTerol/ipratropium for Nebulization 3 milliLiter(s) Nebulizer every 6 hours  apixaban 5 milliGRAM(s) Oral every 12 hours  artificial  tears Solution 1 Drop(s) Both EYES every 6 hours  Biotene Dry Mouth Oral Rinse 5 milliLiter(s) Swish and Spit two times a day  clonazePAM Tablet 1.5 milliGRAM(s) Oral at bedtime  Dakins Solution - 1/4 Strength 1 Application(s) Topical two times a day  dextrose 5%. 1000 milliLiter(s) (50 mL/Hr) IV Continuous <Continuous>  dextrose 50% Injectable 12.5 Gram(s) IV Push once  dextrose 50% Injectable 25 Gram(s) IV Push once  dextrose 50% Injectable 25 Gram(s) IV Push once  famotidine    Tablet 20 milliGRAM(s) Oral daily  ferrous    sulfate Liquid 300 milliGRAM(s) Oral daily  gabapentin   Solution 300 milliGRAM(s) Oral two times a day  insulin lispro (HumaLOG) corrective regimen sliding scale   SubCutaneous every 6 hours  lisinopril 2.5 milliGRAM(s) Oral daily  predniSONE   Tablet 7.5 milliGRAM(s) Oral daily  QUEtiapine 25 milliGRAM(s) Oral at bedtime  tiotropium 18 MICROgram(s) Capsule 1 Capsule(s) Inhalation daily      MEDICATIONS  (PRN):  acetaminophen    Suspension .. 650 milliGRAM(s) Oral every 6 hours PRN Temp greater or equal to 38C (100.4F)  dextrose 40% Gel 15 Gram(s) Oral once PRN Blood Glucose LESS THAN 70 milliGRAM(s)/deciliter  glucagon  Injectable 1 milliGRAM(s) IntraMuscular once PRN Glucose LESS THAN 70 milligrams/deciliter  sodium chloride 0.65% Nasal 1 Spray(s) Both Nostrils two times a day PRN Nasal Congestion       ---___---___---___---___---___---     <<<REVIEW OF SYSTEM: >>>    unable to obtain    ---___---___---___---___---___---          <<<  VITAL SIGNS: >>>    T(F): 98.3 (03-11-19 @ 12:45), Max: 101.2 (03-10-19 @ 22:40)  HR: 114 (03-11-19 @ 13:01) (114 - 116)  BP: 114/92 (03-11-19 @ 13:01) (107/68 - 139/83)  RR: 19 (03-11-19 @ 13:01) (19 - 20)  SpO2: 95% (03-11-19 @ 04:42) (95% - 97%)  Wt(kg): --  CAPILLARY BLOOD GLUCOSE      POCT Blood Glucose.: 107 mg/dL (11 Mar 2019 12:13)    I&O's Summary    10 Mar 2019 07:01  -  11 Mar 2019 07:00  --------------------------------------------------------  IN: 910 mL / OUT: 300 mL / NET: 610 mL    11 Mar 2019 07:01  -  11 Mar 2019 17:37  --------------------------------------------------------  IN: 640 mL / OUT: 500 mL / NET: 140 mL         ---___---___---___---___---___---                       PHYSICAL EXAM:    GEN: A&O X 0 , NAD , comfortable  HEENT: NCAT, PERRL, MMM, no scleral icterus, hearing intact  NECK: Supple, No JVD  CVS: S1S2 , regular , No M/R/G appreciated  PULM: CTA B/L,  no W/R/R appreciated  ABD.: soft. non tender, non distended,  bowel sounds present  Extrem: intact pulses , no edema noted contractures noted   Derm: No rash or ecchymosis noted  PSYCH: normal mood, no depression, not anxious     ---___---___---___---___---___---     <<<  LAB AND IMAGING: >>>                          8.3    20.21 )-----------( 432      ( 11 Mar 2019 08:09 )             28.3               03-11    135  |  96  |  36<H>  ----------------------------<  95  4.4   |  24  |  0.34<L>    Ca    9.1      11 Mar 2019 06:18                                 [All pertinent / recent available Imaging reports and other labs reviewed]     ---___---___---___---___---___---___ ---___---___---___---___---           <<<  A S S E S S M E N T   A N D   P L A N :  >>>  elevated wbc   sepsis  asthma     awating indium scan . patient will need sedation so that she is not contracted and in order to get the best results   spoke to daughter we are running out of option to treat or find the causd of the wbc   will call heme and rheumatology  -GI/DVT Prophylaxis.    --------------------------------------------  Case discussed with   Education given on     >>______________________<<      Deniz Bolden .         phone   [phone]

## 2019-03-12 LAB
ANION GAP SERPL CALC-SCNC: 14 MMOL/L — SIGNIFICANT CHANGE UP (ref 5–17)
APPEARANCE UR: CLEAR — SIGNIFICANT CHANGE UP
BILIRUB UR-MCNC: NEGATIVE — SIGNIFICANT CHANGE UP
BUN SERPL-MCNC: 31 MG/DL — HIGH (ref 7–23)
CALCIUM SERPL-MCNC: 8.8 MG/DL — SIGNIFICANT CHANGE UP (ref 8.4–10.5)
CHLORIDE SERPL-SCNC: 97 MMOL/L — SIGNIFICANT CHANGE UP (ref 96–108)
CO2 SERPL-SCNC: 22 MMOL/L — SIGNIFICANT CHANGE UP (ref 22–31)
COLOR SPEC: YELLOW — SIGNIFICANT CHANGE UP
CREAT SERPL-MCNC: 0.32 MG/DL — LOW (ref 0.5–1.3)
CULTURE RESULTS: SIGNIFICANT CHANGE UP
CULTURE RESULTS: SIGNIFICANT CHANGE UP
DIFF PNL FLD: NEGATIVE — SIGNIFICANT CHANGE UP
FERRITIN SERPL-MCNC: 740 NG/ML — HIGH (ref 15–150)
GLUCOSE BLDC GLUCOMTR-MCNC: 119 MG/DL — HIGH (ref 70–99)
GLUCOSE BLDC GLUCOMTR-MCNC: 129 MG/DL — HIGH (ref 70–99)
GLUCOSE BLDC GLUCOMTR-MCNC: 130 MG/DL — HIGH (ref 70–99)
GLUCOSE BLDC GLUCOMTR-MCNC: 158 MG/DL — HIGH (ref 70–99)
GLUCOSE BLDC GLUCOMTR-MCNC: 173 MG/DL — HIGH (ref 70–99)
GLUCOSE BLDC GLUCOMTR-MCNC: 47 MG/DL — LOW (ref 70–99)
GLUCOSE BLDC GLUCOMTR-MCNC: 94 MG/DL — SIGNIFICANT CHANGE UP (ref 70–99)
GLUCOSE SERPL-MCNC: 149 MG/DL — HIGH (ref 70–99)
GLUCOSE UR QL: NEGATIVE — SIGNIFICANT CHANGE UP
HCT VFR BLD CALC: 23.8 % — LOW (ref 34.5–45)
HCT VFR BLD CALC: 24.5 % — LOW (ref 34.5–45)
HGB BLD-MCNC: 7.4 G/DL — LOW (ref 11.5–15.5)
HGB BLD-MCNC: 7.6 G/DL — LOW (ref 11.5–15.5)
KETONES UR-MCNC: NEGATIVE — SIGNIFICANT CHANGE UP
LEUKOCYTE ESTERASE UR-ACNC: NEGATIVE — SIGNIFICANT CHANGE UP
MCHC RBC-ENTMCNC: 29.6 PG — SIGNIFICANT CHANGE UP (ref 27–34)
MCHC RBC-ENTMCNC: 29.7 PG — SIGNIFICANT CHANGE UP (ref 27–34)
MCHC RBC-ENTMCNC: 31 GM/DL — LOW (ref 32–36)
MCHC RBC-ENTMCNC: 31.1 GM/DL — LOW (ref 32–36)
MCV RBC AUTO: 95.2 FL — SIGNIFICANT CHANGE UP (ref 80–100)
MCV RBC AUTO: 95.7 FL — SIGNIFICANT CHANGE UP (ref 80–100)
NITRITE UR-MCNC: NEGATIVE — SIGNIFICANT CHANGE UP
PH UR: 7 — SIGNIFICANT CHANGE UP (ref 5–8)
PLATELET # BLD AUTO: 439 K/UL — HIGH (ref 150–400)
PLATELET # BLD AUTO: 475 K/UL — HIGH (ref 150–400)
POTASSIUM SERPL-MCNC: 4.7 MMOL/L — SIGNIFICANT CHANGE UP (ref 3.5–5.3)
POTASSIUM SERPL-SCNC: 4.7 MMOL/L — SIGNIFICANT CHANGE UP (ref 3.5–5.3)
PROT UR-MCNC: SIGNIFICANT CHANGE UP
RBC # BLD: 2.5 M/UL — LOW (ref 3.8–5.2)
RBC # BLD: 2.56 M/UL — LOW (ref 3.8–5.2)
RBC # FLD: 15.9 % — HIGH (ref 10.3–14.5)
RBC # FLD: 15.9 % — HIGH (ref 10.3–14.5)
SODIUM SERPL-SCNC: 133 MMOL/L — LOW (ref 135–145)
SP GR SPEC: 1.02 — SIGNIFICANT CHANGE UP (ref 1.01–1.02)
SPECIMEN SOURCE: SIGNIFICANT CHANGE UP
SPECIMEN SOURCE: SIGNIFICANT CHANGE UP
UROBILINOGEN FLD QL: SIGNIFICANT CHANGE UP
WBC # BLD: 29.31 K/UL — HIGH (ref 3.8–10.5)
WBC # BLD: 30.2 K/UL — HIGH (ref 3.8–10.5)
WBC # FLD AUTO: 29.31 K/UL — HIGH (ref 3.8–10.5)
WBC # FLD AUTO: 30.2 K/UL — HIGH (ref 3.8–10.5)

## 2019-03-12 PROCEDURE — 99222 1ST HOSP IP/OBS MODERATE 55: CPT | Mod: GC

## 2019-03-12 PROCEDURE — 78102 BONE MARROW IMAGING LTD: CPT | Mod: 26

## 2019-03-12 PROCEDURE — 78806: CPT | Mod: 26

## 2019-03-12 PROCEDURE — 99223 1ST HOSP IP/OBS HIGH 75: CPT | Mod: GC

## 2019-03-12 RX ADMIN — Medication 5 MILLILITER(S): at 05:41

## 2019-03-12 RX ADMIN — Medication 3 MILLILITER(S): at 18:02

## 2019-03-12 RX ADMIN — Medication 1: at 05:41

## 2019-03-12 RX ADMIN — APIXABAN 5 MILLIGRAM(S): 2.5 TABLET, FILM COATED ORAL at 11:37

## 2019-03-12 RX ADMIN — QUETIAPINE FUMARATE 25 MILLIGRAM(S): 200 TABLET, FILM COATED ORAL at 21:52

## 2019-03-12 RX ADMIN — Medication 650 MILLIGRAM(S): at 21:52

## 2019-03-12 RX ADMIN — Medication 650 MILLIGRAM(S): at 22:00

## 2019-03-12 RX ADMIN — Medication 1 DROP(S): at 17:31

## 2019-03-12 RX ADMIN — Medication 1 APPLICATION(S): at 17:31

## 2019-03-12 RX ADMIN — Medication 1 DROP(S): at 11:40

## 2019-03-12 RX ADMIN — Medication 650 MILLIGRAM(S): at 06:30

## 2019-03-12 RX ADMIN — LISINOPRIL 2.5 MILLIGRAM(S): 2.5 TABLET ORAL at 05:41

## 2019-03-12 RX ADMIN — Medication 3 MILLILITER(S): at 12:13

## 2019-03-12 RX ADMIN — Medication 1: at 11:41

## 2019-03-12 RX ADMIN — FAMOTIDINE 20 MILLIGRAM(S): 10 INJECTION INTRAVENOUS at 11:40

## 2019-03-12 RX ADMIN — Medication 650 MILLIGRAM(S): at 06:14

## 2019-03-12 RX ADMIN — Medication 300 MILLIGRAM(S): at 11:40

## 2019-03-12 RX ADMIN — APIXABAN 5 MILLIGRAM(S): 2.5 TABLET, FILM COATED ORAL at 21:52

## 2019-03-12 RX ADMIN — Medication 1 APPLICATION(S): at 05:41

## 2019-03-12 RX ADMIN — GABAPENTIN 300 MILLIGRAM(S): 400 CAPSULE ORAL at 13:34

## 2019-03-12 RX ADMIN — Medication 5 MILLILITER(S): at 17:31

## 2019-03-12 RX ADMIN — Medication 1.5 MILLIGRAM(S): at 21:52

## 2019-03-12 RX ADMIN — Medication 1 DROP(S): at 05:41

## 2019-03-12 RX ADMIN — Medication 7.5 MILLIGRAM(S): at 05:41

## 2019-03-12 NOTE — PROGRESS NOTE ADULT - SUBJECTIVE AND OBJECTIVE BOX
---___---___---___---___---___---___ ---___---___---___---___---___---___---___---___---___---                    <<<  M E D I C A L   A T T E N D I N G    F O L L O W    U P   N O T E  >>>  going for scan today .     ---___---___---___---___---___---      <<<  MEDICATIONS:  >>>    MEDICATIONS  (STANDING):  ALBUTerol    90 MICROgram(s) HFA Inhaler 1 Puff(s) Inhalation every 4 hours  ALBUTerol/ipratropium for Nebulization 3 milliLiter(s) Nebulizer every 6 hours  apixaban 5 milliGRAM(s) Oral every 12 hours  artificial  tears Solution 1 Drop(s) Both EYES every 6 hours  Biotene Dry Mouth Oral Rinse 5 milliLiter(s) Swish and Spit two times a day  clonazePAM Tablet 1.5 milliGRAM(s) Oral at bedtime  Dakins Solution - 1/4 Strength 1 Application(s) Topical two times a day  dextrose 5%. 1000 milliLiter(s) (50 mL/Hr) IV Continuous <Continuous>  dextrose 50% Injectable 12.5 Gram(s) IV Push once  dextrose 50% Injectable 25 Gram(s) IV Push once  dextrose 50% Injectable 25 Gram(s) IV Push once  famotidine    Tablet 20 milliGRAM(s) Oral daily  ferrous    sulfate Liquid 300 milliGRAM(s) Oral daily  gabapentin   Solution 300 milliGRAM(s) Oral two times a day  insulin lispro (HumaLOG) corrective regimen sliding scale   SubCutaneous every 6 hours  lisinopril 2.5 milliGRAM(s) Oral daily  predniSONE   Tablet 7.5 milliGRAM(s) Oral daily  QUEtiapine 25 milliGRAM(s) Oral at bedtime  tiotropium 18 MICROgram(s) Capsule 1 Capsule(s) Inhalation daily      MEDICATIONS  (PRN):  acetaminophen    Suspension .. 650 milliGRAM(s) Oral every 6 hours PRN Temp greater or equal to 38C (100.4F)  dextrose 40% Gel 15 Gram(s) Oral once PRN Blood Glucose LESS THAN 70 milliGRAM(s)/deciliter  glucagon  Injectable 1 milliGRAM(s) IntraMuscular once PRN Glucose LESS THAN 70 milligrams/deciliter  sodium chloride 0.65% Nasal 1 Spray(s) Both Nostrils two times a day PRN Nasal Congestion       ---___---___---___---___---___---     <<<REVIEW OF SYSTEM: >>>  unable to obtain      ---___---___---___---___---___---          <<<  VITAL SIGNS: >>>    T(F): 101 (03-12-19 @ 05:50), Max: 101.5 (03-11-19 @ 20:14)  HR: 115 (03-12-19 @ 05:44) (114 - 130)  BP: 104/64 (03-12-19 @ 05:44) (104/64 - 154/61)  RR: 18 (03-12-19 @ 05:44) (18 - 19)  SpO2: 97% (03-12-19 @ 05:44) (97% - 97%)  Wt(kg): --  CAPILLARY BLOOD GLUCOSE      POCT Blood Glucose.: 158 mg/dL (12 Mar 2019 05:36)    I&O's Summary    11 Mar 2019 07:01  -  12 Mar 2019 07:00  --------------------------------------------------------  IN: 2020 mL / OUT: 680 mL / NET: 1340 mL         ---___---___---___---___---___---                       PHYSICAL EXAM:    GEN: A&O X 3 , NAD , comfortable  HEENT: NCAT, PERRL, MMM, no scleral icterus, hearing intact  NECK: Supple, No JVD  CVS: S1S2 , regular , No M/R/G appreciated  PULM: CTA B/L,  no W/R/R appreciated  ABD.: soft. non tender, non distended,  bowel sounds present  Extrem: intact pulses , no edema noted  Derm: No rash or ecchymosis noted  PSYCH: normal mood, no depression, not anxious     ---___---___---___---___---___---     <<<  LAB AND IMAGING: >>>                          8.3    20.21 )-----------( 432      ( 11 Mar 2019 08:09 )             28.3               03-12    133<L>  |  97  |  31<H>  ----------------------------<  149<H>  4.7   |  22  |  0.32<L>    Ca    8.8      12 Mar 2019 06:21                                 [All pertinent / recent available Imaging reports and other labs reviewed]     ---___---___---___---___---___---___ ---___---___---___---___---           <<<  A S S E S S M E N T   A N D   P L A N :  >>>    elevated wbc  due for indium scan today   dementia  supportive care   asthma  on budesonide  sacral decubitus  wound care noted   agitation on seroquel  h/o dvt on eloquis       -GI/DVT Prophylaxis.    --------------------------------------------  Case discussed with   Education given on     >>______________________<<      Deniz Bolden .         phone   [phone]

## 2019-03-12 NOTE — CONSULT NOTE ADULT - SUBJECTIVE AND OBJECTIVE BOX
HPI:  67 y/o F w/ pmhx notable for advanced dementia w/ severely impaired functional and cognitive impairment (nonverbal, noncommunicative, chronic gastrostomy tube, bed confined with functional quadriplegia), multiple sacral and hip decubitus ulcers, hx of recent hip MRSA infection and multiple recent hospitalizations who presented with fevers for the 2 days prior to admission. No other focal symptoms. History provided by  at bedside. Pt has had recurrent infections and fevers over the past few months and has been on multiple antibiotic regimens. Currently fevers have persisted with increasing leukocytosis despite antibiotic treatment. Infectious workup has been negative including tagged WBC scan. Now has been off antibiotics with persistent fever.  notes goal is to ensure pt is as comfortable as possible and promote quality of life.       PAST MEDICAL & SURGICAL HISTORY:  CVA (cerebral vascular accident)  Fatty pancreas  PNA (pneumonia)  Pulmonary HTN  IGT (impaired glucose tolerance)  Ulcerative colitis  Acid reflux  Anxiety  Depression  Mouth sores  HLD (hyperlipidemia)  Asthma  Humeral head fracture  H/O: hysterectomy  H/O cataract extraction, left  History of knee replacement      Allergies    ASA; dye contrast (Anaphylaxis)  aspirin (Short breath)  divalproex sodium (Other (Unknown))  Haldol (Other (Unknown))  penicillin (Short breath; Rash)  sulfa drugs (Short breath; Rash)  vancomycin (Rash; Urticaria; Hives)  Xanax (Other (Unknown))    Intolerances        MEDICATIONS  (STANDING):  ALBUTerol    90 MICROgram(s) HFA Inhaler 1 Puff(s) Inhalation every 4 hours  ALBUTerol/ipratropium for Nebulization 3 milliLiter(s) Nebulizer every 6 hours  apixaban 5 milliGRAM(s) Oral every 12 hours  artificial  tears Solution 1 Drop(s) Both EYES every 6 hours  Biotene Dry Mouth Oral Rinse 5 milliLiter(s) Swish and Spit two times a day  clonazePAM Tablet 1.5 milliGRAM(s) Oral at bedtime  Dakins Solution - 1/4 Strength 1 Application(s) Topical two times a day  dextrose 5%. 1000 milliLiter(s) (50 mL/Hr) IV Continuous <Continuous>  dextrose 50% Injectable 12.5 Gram(s) IV Push once  dextrose 50% Injectable 25 Gram(s) IV Push once  dextrose 50% Injectable 25 Gram(s) IV Push once  famotidine    Tablet 20 milliGRAM(s) Oral daily  ferrous    sulfate Liquid 300 milliGRAM(s) Oral daily  gabapentin   Solution 300 milliGRAM(s) Oral two times a day  insulin lispro (HumaLOG) corrective regimen sliding scale   SubCutaneous every 6 hours  lisinopril 2.5 milliGRAM(s) Oral daily  predniSONE   Tablet 7.5 milliGRAM(s) Oral daily  QUEtiapine 25 milliGRAM(s) Oral at bedtime  tiotropium 18 MICROgram(s) Capsule 1 Capsule(s) Inhalation daily    MEDICATIONS  (PRN):  acetaminophen    Suspension .. 650 milliGRAM(s) Oral every 6 hours PRN Temp greater or equal to 38C (100.4F)  dextrose 40% Gel 15 Gram(s) Oral once PRN Blood Glucose LESS THAN 70 milliGRAM(s)/deciliter  glucagon  Injectable 1 milliGRAM(s) IntraMuscular once PRN Glucose LESS THAN 70 milligrams/deciliter  sodium chloride 0.65% Nasal 1 Spray(s) Both Nostrils two times a day PRN Nasal Congestion      FAMILY HISTORY:  Family history of dementia (Grandparent)  Family history of colon cancer (Grandparent)      SOCIAL HISTORY: No EtOH, no tobacco        VITALS:   T(F): 99.7 (03-12-19 @ 12:00), Max: 101.5 (03-11-19 @ 20:14)  HR: 111 (03-12-19 @ 12:00)  BP: 100/67 (03-12-19 @ 12:00)  RR: 18 (03-12-19 @ 12:00)  SpO2: 97% (03-12-19 @ 12:00)  Wt(kg): --    PHYSICAL EXAM    GENERAL: NAD, frail appearing woman currently sleeping   HEAD:  Atraumatic, Normocephalic  EYES: EOMI, PERRLA, conjunctiva and sclera clear  CHEST/LUNG: Clear to auscultation bilaterally  HEART: Tachycardic; No murmurs, rubs, or gallops  ABDOMEN: Soft, Nontender, Nondistended; PEG in place without surrounding erythema   EXTREMITIES:  No clubbing, cyanosis, or edema; extremities contractured   NEUROLOGY: AAOx0, not able to converse   SKIN: No rashes or lesions    LABS:                         8.3    20.21 )-----------( 432      ( 11 Mar 2019 08:09 )             28.3     03-12    133<L>  |  97  |  31<H>  ----------------------------<  149<H>  4.7   |  22  |  0.32<L>    Ca    8.8      12 Mar 2019 06:21          .Blood Blood  03-10 @ 10:06   No growth to date.  --  --      .Blood Blood-Peripheral  03-07 @ 00:38   No growth at 5 days.  --  --      .Blood Blood-Peripheral  03-03 @ 17:14   No growth at 5 days.  --  --      .Blood Blood-Peripheral  03-03 @ 11:53   No growth at 5 days.  --  --      .Blood Blood-Venous  03-01 @ 06:52   No growth at 5 days.  --  --      .Blood Blood-Peripheral  03-01 @ 03:31   No growth at 5 days.  --  --      .Blood Blood-Peripheral  02-27 @ 06:07   Growth in aerobic bottle: Coag Negative Staphylococcus  Single set isolate, possible contaminant. Contact  Microbiology if susceptibility testing clinically  indicated.  "Due to technical problems, Proteus sp. will Not be reported as part of  the BCID panel until further notice"  ***Blood Panel PCR results on this specimen are available  approximately 3 hours after the Gram stain result.***  Gram stain, PCR, and/or culture results may not always  correspond due to difference in methodologies.  ************************************************************  This PCR assay was performed using [employer].  The following targets are tested for: Enterococcus,  vancomycin resistant enterococci, Listeria monocytogenes,  coagulase negative staphylococci, S. aureus,  methicillin resistant S. aureus, Streptococcus agalactiae  (Group B), S. pneumoniae, S. pyogenes (Group A),  Acinetobacter baumannii, Enterobacter cloacae, E. coli,  Klebsiella oxytoca, K. pneumoniae, Proteus sp.,  Serratia marcescens, Haemophilus influenzae,  Neisseria meningitidis, Pseudomonas aeruginosa, Candida  albicans, C. glabrata, C krusei, C parapsilosis,  C. tropicalis and the KPC resistance gene.  --  Blood Culture PCR      .Stool Feces  02-27 @ 05:08   No Protozoa seen by trichrome stain  No Helminths or Protozoa seen in formalin concentrate  performed by iodine stain  (routine O+P not evaluated for Microsporidia,  Cryptosporidia, Cyclospora, or Isospora.)  Note: One negative specimen does not rule  out the possibility of a parasitic infection.  --  --      .Stool Feces  02-27 @ 03:27   GI PCR Results: NOT detected  *******Please Note:*******  GI panel PCR evaluates for:  Campylobacter, Plesiomonas shigelloides, Salmonella,  Vibrio, Yersinia enterocolitica, Enteroaggregative  Escherichia coli (EAEC), Enteropathogenic E.coli (EPEC),  Enterotoxigenic E. coli (ETEC) lt/st, Shiga-like  toxin-producing E. coli (STEC) stx1/stx2,  Shigella/ Enteroinvasive E. coli (EIEC), Cryptosporidium,  Cyclospora cayetanensis, Entamoeba histolytica,  Giardia lamblia, Adenovirus F 40/41, Astrovirus,  Norovirus GI/GII, Rotavirus A, Sapovirus  --  --      .Stool Feces  02-27 @ 03:25   No enteric pathogens isolated.  (Stool culture examined for Salmonella,  Shigella, Campylobacter, Aeromonas, Plesiomonas,  Vibrio, E.coli O157 and Yersinia)  --  --      .Urine Clean Catch (Midstream)  02-27 @ 02:55   >100,000 CFU/ml Enterobacter hormaechei  >100,000 CFU/ml Enterococcus faecalis  --  Enterbacter hormaechai  Enterococcus faecalis      .Blood Blood  01-09 @ 10:05   No growth at 5 days.  --  --      .Blood Blood-Peripheral  01-07 @ 08:36   No growth at 5 days.  --  --      .Blood Blood-Peripheral  01-04 @ 01:08   No growth at 5 days.  --  --      Skin sacral decubitus ulcer  01-01 @ 22:10   Few Enterobacter cloacae/asburiae  Few Enterococcus faecium  Few Coag Negative Staphylococcus "Susceptibilities not performed"  Rare Candida albicans "Susceptibilities not performed"  --  Enterobacter cloacae/absuria  Enterococcus faecium      .Blood Blood  01-01 @ 09:04   No growth at 5 days.  --  --      .Blood Blood-Peripheral  12-29 @ 02:43   No growth at 5 days.  --  --      .Urine Catheterized  12-28 @ 08:59   >100,000 CFU/ml Yeast-like cells, presumptively not Candida albicans  --  --      .Blood Blood  12-27 @ 08:39   No growth at 5 days.  --  --      .Stool Feces  12-25 @ 08:21   No enteric pathogens isolated.  (Stool culture examined for Salmonella,  Shigella, Campylobacter, Aeromonas, Plesiomonas,  Vibrio, E.coli O157 and Yersinia)  --  --      .Blood Blood-Peripheral  12-23 @ 17:17   No growth at 5 days.  --  --      .Urine Catheterized  12-21 @ 10:24   10,000 - 49,000 CFU/mL Yeast-like cells, presumptively not Candida  albicans  --  --      .Blood Blood  12-21 @ 05:07   No fungus isolated after 4 weeks.  --  --      .Blood Blood-Peripheral  12-19 @ 23:45   No growth at 5 days.  --  --      .Urine Clean Catch (Midstream)  12-14 @ 13:19   50,000 - 99,000 CFU/mL Yeast-like cells, presumptively not Candida  albicans  --  --      .Blood Blood-Peripheral  12-14 @ 07:28   No growth at 5 days.  --  --          IMAGING:

## 2019-03-12 NOTE — CONSULT NOTE ADULT - ASSESSMENT
68F with advanced dementia (nonverbal, dysphagia with PEG tube, bedbound, chronic gavin), sacral pressure ulcer stage 3, severe persistent asthma on daily Prednisone, right prosthetic hip MRSA infection in 07/2018 past episodes of UTI and aspiration pneumonia who presents with fever of unknown origin with previous admission notable for same diagnosis resolved with discontinuation of oral minocycline.     1) Fever of unknown origin 68F with advanced dementia (nonverbal, dysphagia with PEG tube, bedbound, chronic gavin), sacral pressure ulcer stage 3, severe persistent asthma on daily Prednisone, right prosthetic hip MRSA infection in 07/2018 past episodes of UTI and aspiration pneumonia who presents with fever of unknown origin with previous admission notable for same diagnosis resolved with discontinuation of oral minocycline.     1) Fever of unknown origin  Differentials are broken into three main categories: infectious vs. inflammatory vs. malignancy  Infectious causes ruled out with extensive work up/ repeatedly negative blood cultures and negative indium 111 labeled leukocyte study 68F with advanced dementia (nonverbal, dysphagia with PEG tube, bedbound, chronic gavin), sacral pressure ulcer stage 3, severe persistent asthma on daily Prednisone, right chronic septic hip infection with previous cultures positive for MRSA in 07/2018, previous history of UTI and aspiration pneumonia who presents with febrile episodes, initially thought to be due to drug fever with interim improvement off minocycline, but now persists. We are asked to evaluate for fever of unknown origin.     Her previous rheumatic disease work up (positive centromere Ab and positive PIOTR) may suggest a form of scleroderma in the setting of pulmonary hypertension (PASP of 82 mmHg).  However, systemic sclerosis is not known to cause fevers in the setting of chronic septic arthritis and severe MR. Differentials for severe MR can include blood culture negative subacute endocarditis. At this point, no evidence of rheumatic disease exists that can elucidate patients febrile episodes. 68F with advanced dementia (nonverbal, dysphagia with PEG tube, bedbound, chronic gavin), sacral pressure ulcer stage 3, severe persistent asthma on daily Prednisone, right chronic septic hip infection with previous cultures positive for MRSA in 07/2018, previous history of UTI and aspiration pneumonia who presents with febrile episodes, initially thought to be due to drug fever with interim improvement off minocycline, but now persists.     Her previous rheumatic disease work up (positive centromere Ab and positive PIOTR) in the setting of pulmonary hypertension (PASP of 82 mmHg), but there is no clinical stigmata to make a dx of systemic sclerosis, moreover this is not a cause of recurrent fevers. At this point, there is no evidence of rheumatic disease that could be a cause of febrile episodes.   Infection is the most likely cause of fever in the setting  chronic septic arthritis . Considering severe MR , would r/o subacute endocarditis. At this point, no evidence of rheumatic disease exists that can elucidate patients febrile episodes.   Extremely poor functional status . Poor prognosis.    - continue supportive care  - palliative care fu to discuss treatment goals

## 2019-03-12 NOTE — CONSULT NOTE ADULT - SUBJECTIVE AND OBJECTIVE BOX
MYRIAM HEATH  [id number]    HISTORY OF PRESENT ILLNESS:  68 year old woman with advanced dementia (nonverbal, dysphagia with PEG tube, remote history of ulcerative colitis, bedbound, chronic gavin), sacral pressure ulcer stage 3, severe persistent asthma on daily Prednisone, right prosthetic hip in 2018 with post op complications notable for MRSA bacteremia leading to Rt hip explant, antibiotic spacer placement.  Pt admitted on 2019 for 2 day history of high fevers and lethargy.     HPI starts in patient's admission in 2018 where patient was admitted for the same complaint of fever of unknown origin. At the time, pt was evaluated by Rheumatology and further work up revealed patient to be PIOTR +, Centromere Ab +, butbut APLS, C-ANCA, p-ANCA labs were within normal limits. As patient was not having clinical symptoms of scleroderma, patient was not considered for management/ treatment of scleroderma. Additionally, patient was on suppressive minocycline which was stopped for concerns of drug fever.  Per , since discharge, patient had low grade fevers.     Patient's hospitalization is complicated by episodes of hypotension, requiring pressors and admission to MICU.  She is currently being followed by ID and was on broad spectrum antibiotics along with micafungin, but due to no response in fevers, antibiotics were discontinued.  Blood cultures were negative in most cultures (except for  blood culture, which was positive for coag negative staph consistent with staph epidermis - likely contaminant).     Pt continues to be febrile with a Tmax of 101.5.  Per , she is mostly non-verbal though she does communicate if she is in pain by saying "ouch".  She cannot verbalize where her pain is and description of her pain.  He denies seeing any rashes or joint swelling.  He does report she has sacral ulcers, which cause her pain.   denies any hair loss, weight loss, dry eyes.          PAST MEDICAL & SURGICAL HISTORY:  CVA (cerebral vascular accident)  Fatty pancreas  PNA (pneumonia)  Pulmonary HTN  IGT (impaired glucose tolerance)  Ulcerative colitis  Acid reflux  Anxiety  Depression  Mouth sores  HLD (hyperlipidemia)  Asthma  Humeral head fracture  H/O: hysterectomy  H/O cataract extraction, left  History of knee replacement      Review of Systems: No Review of systems was obtained from patient due to non-verbal state.      MEDICATIONS  (STANDING):  ALBUTerol    90 MICROgram(s) HFA Inhaler 1 Puff(s) Inhalation every 4 hours  ALBUTerol/ipratropium for Nebulization 3 milliLiter(s) Nebulizer every 6 hours  apixaban 5 milliGRAM(s) Oral every 12 hours  artificial  tears Solution 1 Drop(s) Both EYES every 6 hours  Biotene Dry Mouth Oral Rinse 5 milliLiter(s) Swish and Spit two times a day  clonazePAM Tablet 1.5 milliGRAM(s) Oral at bedtime  Dakins Solution - 1/4 Strength 1 Application(s) Topical two times a day  dextrose 5%. 1000 milliLiter(s) (50 mL/Hr) IV Continuous <Continuous>  dextrose 50% Injectable 12.5 Gram(s) IV Push once  dextrose 50% Injectable 25 Gram(s) IV Push once  dextrose 50% Injectable 25 Gram(s) IV Push once  famotidine    Tablet 20 milliGRAM(s) Oral daily  ferrous    sulfate Liquid 300 milliGRAM(s) Oral daily  gabapentin   Solution 300 milliGRAM(s) Oral two times a day  insulin lispro (HumaLOG) corrective regimen sliding scale   SubCutaneous every 6 hours  lisinopril 2.5 milliGRAM(s) Oral daily  predniSONE   Tablet 7.5 milliGRAM(s) Oral daily  QUEtiapine 25 milliGRAM(s) Oral at bedtime  tiotropium 18 MICROgram(s) Capsule 1 Capsule(s) Inhalation daily    MEDICATIONS  (PRN):  acetaminophen    Suspension .. 650 milliGRAM(s) Oral every 6 hours PRN Temp greater or equal to 38C (100.4F)  dextrose 40% Gel 15 Gram(s) Oral once PRN Blood Glucose LESS THAN 70 milliGRAM(s)/deciliter  glucagon  Injectable 1 milliGRAM(s) IntraMuscular once PRN Glucose LESS THAN 70 milligrams/deciliter  sodium chloride 0.65% Nasal 1 Spray(s) Both Nostrils two times a day PRN Nasal Congestion      Allergies    ASA; dye contrast (Anaphylaxis)  aspirin (Short breath)  divalproex sodium (Other (Unknown))  Haldol (Other (Unknown))  penicillin (Short breath; Rash)  sulfa drugs (Short breath; Rash)  vancomycin (Rash; Urticaria; Hives)  Xanax (Other (Unknown))      FAMILY HISTORY:  Family history of dementia (Grandparent)  Family history of colon cancer (Grandparent)      Vital Signs Last 24 Hrs  T(C): 37.6 (12 Mar 2019 12:00), Max: 38.6 (11 Mar 2019 20:14)  T(F): 99.7 (12 Mar 2019 12:00), Max: 101.5 (11 Mar 2019 20:14)  HR: 111 (12 Mar 2019 12:00) (111 - 130)  BP: 100/67 (12 Mar 2019 12:00) (100/67 - 154/61)  BP(mean): --  RR: 18 (12 Mar 2019 12:00) (18 - 18)  SpO2: 97% (12 Mar 2019 12:00) (97% - 97%)        Physical Exam:  General: No apparent distress, awake, alert  HEENT: EOMI.  Patient unable to open mouth - cannot assess oropharynx   CVS: +S1/S2, RRR  Resp: CTA b/l - patient taking shallow breaths   GI: Soft, NT/ND, + PEG tube - site is clean and non-erythematous  MSK: contracted  Neuro: Does not follow commands - unable to perform  muscle strength: does not follow command - unable to perform  Skin: no  rashes     LABS:                        8.3    20.21 )-----------( 432      ( 11 Mar 2019 08:09 )             28.3     03-12    133<L>  |  97  |  31<H>  ----------------------------<  149<H>  4.7   |  22  |  0.32<L>    Ca    8.8      12 Mar 2019 06:21      Labs from previous admission:  Centromere Antibody: >8.0: Fluorescent Bead Immunoassay                      Centomere B Antibodies, IgG                      <1.0 AI (negative)                      > or =1.0 AI (positive)                      Reference values apply to all  ages. AI (.18 @ 16:49)    Anti-Nuclear Antibody (..18 @ 14:47)    PIOTR Pattern: Centromere    Dilute Dionisio&#x27;s Viper Venom Time (. @ 14:47)    DRVVT Inhibitor Screen: 43.3: The presence of direct thrombin inhibitors (such as argatroban,  refludan), or direct  Xa inhibitors (such as fondaparinux)  may cause  false positive results. sec    DRVVT 50/50: 36.6 sec    DRVVT S/C Ratio: LA NEG    Anticardiolipin Antibody Level, Total (12.24.18 @ 14:47)    Anticardiolipin Antibody Level, Total: Negative: Method: EIA    Beta 2 Glycoprotein 1 Antibody Screen (.24.18 @ 14:47)    Beta 2 Glycoprotein 1 Antibody Screen: Negative    Silica Clotting Time (.24.18 @ 14:47)    Silica Clotting Time S/C Ratio: 0.85 ratio    Silica Clotting Time Interpretation: LA NEG: NOTE: The presence of direct thrombin inhibitors (such as Argatroban,  Refludan), UF Heparin >0.5 U/ml or LMW Heparin >1.0 U/ml may affect  Results.      ESR 46  CRP 13.69  dsDNA < 12  Histone Ab 0.2  c-Anca, p-Anca negative  Myeloperoxidase ab negative  Proteinase 3 antibody assay < 5.0        Urinalysis Basic - ( 12 Mar 2019 03:24 )    Color: Yellow / Appearance: Clear / S.024 / pH: x  Gluc: x / Ketone: Negative  / Bili: Negative / Urobili: <2 mg/dL   Blood: x / Protein: Trace / Nitrite: Negative   Leuk Esterase: Negative / RBC: x / WBC x   Sq Epi: x / Non Sq Epi: x / Bacteria: x        RADIOLOGY & ADDITIONAL STUDIES:    < from: NM Bone Marrow Imaging Limited (19 @ 10:04) >  IMPRESSION:  Normal combined Indium-111 labeled leukocyte study and marrow scan.    No scan evidence of infection.    Etiology of fever is not elucidated in this study.    < end of copied text >    < from: CT Head No Cont (19 @ 22:39) >  IMPRESSION:    No acute intracranial hemorrhage, mass effect, vasogenic edema, or evidence of acute territorial infarct.    Slight interval enlargement of the ventricles and sulci, consistent with progressive atrophy.    Slightly increased nonspecific periventricular lucency may represent white matter microvascular ischemic disease.    < end of copied text >    < from: CT Abdomen and Pelvis w/ Oral Cont (19 @ 08:46) >  IMPRESSION:   Limited exam.    Left-sided sacral decubitus ulcer without definite cortical erosion of the underlying sacrum. MRI would be more sensitive to evaluate for osteomyelitis.    No acute intra-abdominal pathology or collection.    < end of copied text >    < from: CT Chest No Cont (19 @ 16:32) >  IMPRESSION:    Patchy bilateral groundglass opacities, unchanged since 2016. No new consolidations.    Incidental findings as above      < end of copied text > MYRIAM HEATH  [id number]    HISTORY OF PRESENT ILLNESS:  68 year old woman with advanced dementia (nonverbal, dysphagia with PEG tube, remote history of ulcerative colitis, bedbound, chronic gavin), sacral pressure ulcer stage 3, severe persistent asthma on daily Prednisone, right prosthetic hip in 2018 with post op complications notable for MRSA bacteremia leading to Rt hip explant, antibiotic spacer placement.  Pt admitted on 2019 for 2 day history of high fevers and lethargy.     HPI starts in patient's admission in 2018 where patient was admitted for the same complaint of fever of unknown origin. At the time, pt was evaluated by Rheumatology and further work up revealed patient to be PIOTR +, Centromere Ab +, butbut APLS, C-ANCA, p-ANCA labs were within normal limits. As patient was not having clinical symptoms of scleroderma, patient was not considered for management/ treatment of scleroderma. Additionally, patient was on suppressive minocycline which was stopped for concerns of drug fever.  Per , since discharge, patient had low grade fevers.     Patient's hospitalization is complicated by episodes of hypotension, requiring pressors and admission to MICU.  She is currently being followed by ID and was on broad spectrum antibiotics along with micafungin, but due to no response in fevers, antibiotics were discontinued since 3/8.  Blood cultures were negative in most cultures (except for  blood culture, which was positive for coag negative staph consistent with staph epidermis - likely contaminant).     Pt continues to be febrile with a Tmax of 101.5.  Per , she is mostly non-verbal though she does communicate if she is in pain by saying "ouch".  She cannot verbalize where her pain is and description of her pain.  He denies seeing any rashes or joint swelling.  He does report she has sacral ulcers, which cause her pain.   denies any hair loss, weight loss, dry eyes.          PAST MEDICAL & SURGICAL HISTORY:  CVA (cerebral vascular accident)  Fatty pancreas  PNA (pneumonia)  Pulmonary HTN  IGT (impaired glucose tolerance)  Ulcerative colitis  Acid reflux  Anxiety  Depression  Mouth sores  HLD (hyperlipidemia)  Asthma  Humeral head fracture  H/O: hysterectomy  H/O cataract extraction, left  History of knee replacement      Review of Systems: No Review of systems was obtained from patient due to non-verbal state.      MEDICATIONS  (STANDING):  ALBUTerol    90 MICROgram(s) HFA Inhaler 1 Puff(s) Inhalation every 4 hours  ALBUTerol/ipratropium for Nebulization 3 milliLiter(s) Nebulizer every 6 hours  apixaban 5 milliGRAM(s) Oral every 12 hours  artificial  tears Solution 1 Drop(s) Both EYES every 6 hours  Biotene Dry Mouth Oral Rinse 5 milliLiter(s) Swish and Spit two times a day  clonazePAM Tablet 1.5 milliGRAM(s) Oral at bedtime  Dakins Solution - 1/4 Strength 1 Application(s) Topical two times a day  dextrose 5%. 1000 milliLiter(s) (50 mL/Hr) IV Continuous <Continuous>  dextrose 50% Injectable 12.5 Gram(s) IV Push once  dextrose 50% Injectable 25 Gram(s) IV Push once  dextrose 50% Injectable 25 Gram(s) IV Push once  famotidine    Tablet 20 milliGRAM(s) Oral daily  ferrous    sulfate Liquid 300 milliGRAM(s) Oral daily  gabapentin   Solution 300 milliGRAM(s) Oral two times a day  insulin lispro (HumaLOG) corrective regimen sliding scale   SubCutaneous every 6 hours  lisinopril 2.5 milliGRAM(s) Oral daily  predniSONE   Tablet 7.5 milliGRAM(s) Oral daily  QUEtiapine 25 milliGRAM(s) Oral at bedtime  tiotropium 18 MICROgram(s) Capsule 1 Capsule(s) Inhalation daily    MEDICATIONS  (PRN):  acetaminophen    Suspension .. 650 milliGRAM(s) Oral every 6 hours PRN Temp greater or equal to 38C (100.4F)  dextrose 40% Gel 15 Gram(s) Oral once PRN Blood Glucose LESS THAN 70 milliGRAM(s)/deciliter  glucagon  Injectable 1 milliGRAM(s) IntraMuscular once PRN Glucose LESS THAN 70 milligrams/deciliter  sodium chloride 0.65% Nasal 1 Spray(s) Both Nostrils two times a day PRN Nasal Congestion      Allergies    ASA; dye contrast (Anaphylaxis)  aspirin (Short breath)  divalproex sodium (Other (Unknown))  Haldol (Other (Unknown))  penicillin (Short breath; Rash)  sulfa drugs (Short breath; Rash)  vancomycin (Rash; Urticaria; Hives)  Xanax (Other (Unknown))      FAMILY HISTORY:  Family history of dementia (Grandparent)  Family history of colon cancer (Grandparent)      Vital Signs Last 24 Hrs  T(C): 37.6 (12 Mar 2019 12:00), Max: 38.6 (11 Mar 2019 20:14)  T(F): 99.7 (12 Mar 2019 12:00), Max: 101.5 (11 Mar 2019 20:14)  HR: 111 (12 Mar 2019 12:00) (111 - 130)  BP: 100/67 (12 Mar 2019 12:00) (100/67 - 154/61)  BP(mean): --  RR: 18 (12 Mar 2019 12:00) (18 - 18)  SpO2: 97% (12 Mar 2019 12:00) (97% - 97%)        Physical Exam:  General: No apparent distress, awake, alert  HEENT: EOMI.  Patient unable to open mouth - cannot assess oropharynx   CVS: +S1/S2, RRR  Resp: CTA b/l - patient taking shallow breaths   GI: Soft, NT/ND, + PEG tube - site is clean and non-erythematous  MSK: contracted  Neuro: Does not follow commands - unable to perform  muscle strength: does not follow command - unable to perform  Skin: no  rashes     LABS:                        8.3    20.21 )-----------( 432      ( 11 Mar 2019 08:09 )             28.3     03-12    133<L>  |  97  |  31<H>  ----------------------------<  149<H>  4.7   |  22  |  0.32<L>    Ca    8.8      12 Mar 2019 06:21      Labs from previous admission:  Centromere Antibody: >8.0: Fluorescent Bead Immunoassay                      Centomere B Antibodies, IgG                      <1.0 AI (negative)                      > or =1.0 AI (positive)                      Reference values apply to all  ages. AI (..18 @ 16:49)    Anti-Nuclear Antibody (12.24.18 @ 14:47)    PIOTR Pattern: Centromere    Dilute Dionisio&#x27;s Viper Venom Time (12.24.18 @ 14:47)    DRVVT Inhibitor Screen: 43.3: The presence of direct thrombin inhibitors (such as argatroban,  refludan), or direct  Xa inhibitors (such as fondaparinux)  may cause  false positive results. sec    DRVVT 50/50: 36.6 sec    DRVVT S/C Ratio: LA NEG    Anticardiolipin Antibody Level, Total (12.24.18 @ 14:47)    Anticardiolipin Antibody Level, Total: Negative: Method: EIA    Beta 2 Glycoprotein 1 Antibody Screen (12.24.18 @ 14:47)    Beta 2 Glycoprotein 1 Antibody Screen: Negative    Silica Clotting Time (12.24.18 @ 14:47)    Silica Clotting Time S/C Ratio: 0.85 ratio    Silica Clotting Time Interpretation: LA NEG: NOTE: The presence of direct thrombin inhibitors (such as Argatroban,  Refludan), UF Heparin >0.5 U/ml or LMW Heparin >1.0 U/ml may affect  Results.      ESR 46  CRP 13.69  dsDNA < 12  Histone Ab 0.2  c-Anca, p-Anca negative  Myeloperoxidase ab negative  Proteinase 3 antibody assay < 5.0        Urinalysis Basic - ( 12 Mar 2019 03:24 )    Color: Yellow / Appearance: Clear / S.024 / pH: x  Gluc: x / Ketone: Negative  / Bili: Negative / Urobili: <2 mg/dL   Blood: x / Protein: Trace / Nitrite: Negative   Leuk Esterase: Negative / RBC: x / WBC x   Sq Epi: x / Non Sq Epi: x / Bacteria: x        RADIOLOGY & ADDITIONAL STUDIES:    < from: NM Bone Marrow Imaging Limited (19 @ 10:04) >  IMPRESSION:  Normal combined Indium-111 labeled leukocyte study and marrow scan.    No scan evidence of infection.    Etiology of fever is not elucidated in this study.    < end of copied text >    < from: CT Head No Cont (19 @ 22:39) >  IMPRESSION:    No acute intracranial hemorrhage, mass effect, vasogenic edema, or evidence of acute territorial infarct.    Slight interval enlargement of the ventricles and sulci, consistent with progressive atrophy.    Slightly increased nonspecific periventricular lucency may represent white matter microvascular ischemic disease.    < end of copied text >    < from: CT Abdomen and Pelvis w/ Oral Cont (19 @ 08:46) >  IMPRESSION:   Limited exam.    Left-sided sacral decubitus ulcer without definite cortical erosion of the underlying sacrum. MRI would be more sensitive to evaluate for osteomyelitis.    No acute intra-abdominal pathology or collection.    < end of copied text >    < from: CT Chest No Cont (19 @ 16:32) >  IMPRESSION:    Patchy bilateral groundglass opacities, unchanged since 2016. No new consolidations.    Incidental findings as above      < end of copied text > MYRIAM HEATH  [id number]    HISTORY OF PRESENT ILLNESS:  68 year old woman with advanced dementia (nonverbal, dysphagia with PEG tube, remote history of ulcerative colitis, bedbound, chronic gavin), sacral pressure ulcer stage 3, severe persistent asthma on daily Prednisone, right prosthetic hip in 2018 with post op complications notable for MRSA bacteremia leading to Rt hip explant, antibiotic spacer placement.  Pt admitted on 2019 for 2 day history of high fevers and lethargy.     HPI starts in patient's admission in 2018 where patient was admitted for the same complaint of fever of unknown origin. At the time, pt was evaluated by Rheumatology and further work up revealed patient to be PIOTR +, Centromere Ab +, butbut APLS, C-ANCA, p-ANCA labs were within normal limits. As patient was not having clinical symptoms of scleroderma, patient was not considered for management/ treatment of scleroderma. Additionally, patient was on suppressive minocycline which was stopped for concerns of drug fever.  Per , since discharge, patient had low grade fevers.     Patient's hospitalization is complicated by episodes of hypotension, requiring pressors and admission to MICU.  She is currently being followed by ID and was on broad spectrum antibiotics along with micafungin, but due to no response in fevers, antibiotics were discontinued since 3/8.  Blood cultures were negative in most cultures (except for  blood culture, which was positive for coag negative staph consistent with staph epidermis - likely contaminant).     Pt continues to be febrile with a Tmax of 101.5.  Per , she is mostly non-verbal though she does communicate if she is in pain by saying "ouch".  She cannot verbalize where her pain is and description of her pain.  He denies seeing any rashes or joint swelling.  He does report she has sacral ulcers, which cause her pain.   denies any hair loss, weight loss, dry eyes.          PAST MEDICAL & SURGICAL HISTORY:  CVA (cerebral vascular accident)  Fatty pancreas  PNA (pneumonia)  Pulmonary HTN  IGT (impaired glucose tolerance)  Ulcerative colitis  Acid reflux  Anxiety  Depression  Mouth sores  HLD (hyperlipidemia)  Asthma  Humeral head fracture  H/O: hysterectomy  H/O cataract extraction, left  History of knee replacement      Review of Systems: No Review of systems was obtained from patient due to non-verbal state.      MEDICATIONS  (STANDING):  ALBUTerol    90 MICROgram(s) HFA Inhaler 1 Puff(s) Inhalation every 4 hours  ALBUTerol/ipratropium for Nebulization 3 milliLiter(s) Nebulizer every 6 hours  apixaban 5 milliGRAM(s) Oral every 12 hours  artificial  tears Solution 1 Drop(s) Both EYES every 6 hours  Biotene Dry Mouth Oral Rinse 5 milliLiter(s) Swish and Spit two times a day  clonazePAM Tablet 1.5 milliGRAM(s) Oral at bedtime  Dakins Solution - 1/4 Strength 1 Application(s) Topical two times a day  dextrose 5%. 1000 milliLiter(s) (50 mL/Hr) IV Continuous <Continuous>  dextrose 50% Injectable 12.5 Gram(s) IV Push once  dextrose 50% Injectable 25 Gram(s) IV Push once  dextrose 50% Injectable 25 Gram(s) IV Push once  famotidine    Tablet 20 milliGRAM(s) Oral daily  ferrous    sulfate Liquid 300 milliGRAM(s) Oral daily  gabapentin   Solution 300 milliGRAM(s) Oral two times a day  insulin lispro (HumaLOG) corrective regimen sliding scale   SubCutaneous every 6 hours  lisinopril 2.5 milliGRAM(s) Oral daily  predniSONE   Tablet 7.5 milliGRAM(s) Oral daily  QUEtiapine 25 milliGRAM(s) Oral at bedtime  tiotropium 18 MICROgram(s) Capsule 1 Capsule(s) Inhalation daily    MEDICATIONS  (PRN):  acetaminophen    Suspension .. 650 milliGRAM(s) Oral every 6 hours PRN Temp greater or equal to 38C (100.4F)  dextrose 40% Gel 15 Gram(s) Oral once PRN Blood Glucose LESS THAN 70 milliGRAM(s)/deciliter  glucagon  Injectable 1 milliGRAM(s) IntraMuscular once PRN Glucose LESS THAN 70 milligrams/deciliter  sodium chloride 0.65% Nasal 1 Spray(s) Both Nostrils two times a day PRN Nasal Congestion      Allergies    ASA; dye contrast (Anaphylaxis)  aspirin (Short breath)  divalproex sodium (Other (Unknown))  Haldol (Other (Unknown))  penicillin (Short breath; Rash)  sulfa drugs (Short breath; Rash)  vancomycin (Rash; Urticaria; Hives)  Xanax (Other (Unknown))      FAMILY HISTORY:  Family history of dementia (Grandparent)  Family history of colon cancer (Grandparent)      Vital Signs Last 24 Hrs  T(C): 37.6 (12 Mar 2019 12:00), Max: 38.6 (11 Mar 2019 20:14)  T(F): 99.7 (12 Mar 2019 12:00), Max: 101.5 (11 Mar 2019 20:14)  HR: 111 (12 Mar 2019 12:00) (111 - 130)  BP: 100/67 (12 Mar 2019 12:00) (100/67 - 154/61)  BP(mean): --  RR: 18 (12 Mar 2019 12:00) (18 - 18)  SpO2: 97% (12 Mar 2019 12:00) (97% - 97%)        Physical Exam:  General: No apparent distress, awake, alert  HEENT: EOMI.  Patient unable to open mouth - cannot assess oropharynx   CVS: +S1/S2, RRR  Resp: CTA b/l - patient taking shallow breaths   GI: Soft, NT/ND, + PEG tube - site is clean and non-erythematous  MSK: flexion contractions of knees and hips, and elbows with no swelling of joints, no evidence of wrists, fingers  Neuro: Does not follow commands - unable to perform  muscle strength: does not follow command - unable to perform  Skin: no  rashes     LABS:                        8.3    20.21 )-----------( 432      ( 11 Mar 2019 08:09 )             28.3     03-12    133<L>  |  97  |  31<H>  ----------------------------<  149<H>  4.7   |  22  |  0.32<L>    Ca    8.8      12 Mar 2019 06:21      Labs from previous admission:  Centromere Antibody: >8.0: Fluorescent Bead Immunoassay                      Centomere B Antibodies, IgG                      <1.0 AI (negative)                      > or =1.0 AI (positive)                      Reference values apply to all  ages. AI (12.25.18 @ 16:49)    Anti-Nuclear Antibody (12.24.18 @ 14:47)    PIOTR Pattern: Centromere    Dilute Dionisio&#x27;s Viper Venom Time (. @ 14:47)    DRVVT Inhibitor Screen: 43.3: The presence of direct thrombin inhibitors (such as argatroban,  refludan), or direct  Xa inhibitors (such as fondaparinux)  may cause  false positive results. sec    DRVVT 50/50: 36.6 sec    DRVVT S/C Ratio: LA NEG    Anticardiolipin Antibody Level, Total (12.24.18 @ 14:47)    Anticardiolipin Antibody Level, Total: Negative: Method: EIA    Beta 2 Glycoprotein 1 Antibody Screen (12.24.18 @ 14:47)    Beta 2 Glycoprotein 1 Antibody Screen: Negative    Silica Clotting Time (12.24.18 @ 14:47)    Silica Clotting Time S/C Ratio: 0.85 ratio    Silica Clotting Time Interpretation: LA NEG: NOTE: The presence of direct thrombin inhibitors (such as Argatroban,  Refludan), UF Heparin >0.5 U/ml or LMW Heparin >1.0 U/ml may affect  Results.      ESR 46  CRP 13.69  dsDNA < 12  Histone Ab 0.2  c-Anca, p-Anca negative  Myeloperoxidase ab negative  Proteinase 3 antibody assay < 5.0        Urinalysis Basic - ( 12 Mar 2019 03:24 )    Color: Yellow / Appearance: Clear / S.024 / pH: x  Gluc: x / Ketone: Negative  / Bili: Negative / Urobili: <2 mg/dL   Blood: x / Protein: Trace / Nitrite: Negative   Leuk Esterase: Negative / RBC: x / WBC x   Sq Epi: x / Non Sq Epi: x / Bacteria: x        RADIOLOGY & ADDITIONAL STUDIES:    < from: NM Bone Marrow Imaging Limited (19 @ 10:04) >  IMPRESSION:  Normal combined Indium-111 labeled leukocyte study and marrow scan.    No scan evidence of infection.    Etiology of fever is not elucidated in this study.    < end of copied text >    < from: CT Head No Cont (19 @ 22:39) >  IMPRESSION:    No acute intracranial hemorrhage, mass effect, vasogenic edema, or evidence of acute territorial infarct.    Slight interval enlargement of the ventricles and sulci, consistent with progressive atrophy.    Slightly increased nonspecific periventricular lucency may represent white matter microvascular ischemic disease.    < end of copied text >    < from: CT Abdomen and Pelvis w/ Oral Cont (19 @ 08:46) >  IMPRESSION:   Limited exam.    Left-sided sacral decubitus ulcer without definite cortical erosion of the underlying sacrum. MRI would be more sensitive to evaluate for osteomyelitis.    No acute intra-abdominal pathology or collection.    < end of copied text >    < from: CT Chest No Cont (19 @ 16:32) >  IMPRESSION:    Patchy bilateral groundglass opacities, unchanged since 2016. No new consolidations.    Incidental findings as above      < end of copied text >

## 2019-03-12 NOTE — PROGRESS NOTE ADULT - SUBJECTIVE AND OBJECTIVE BOX
CC: f/u for fever, leukocytosis    Patient reports  nothing intelligible  REVIEW OF SYSTEMS:  All other review of systems cannot get (Comprehensive ROS)    Antimicrobials Day #  :    Other Medications Reviewed    T(F): 99.7 (19 @ 12:00), Max: 101.5 (19 @ 20:14)  HR: 111 (19 @ 12:00)  BP: 100/67 (19 @ 12:00)  RR: 18 (19 @ 12:00)  SpO2: 97% (19 @ 12:00)  Wt(kg): --    PHYSICAL EXAM:  General:fairly  alert, no acute distress  Eyes:  anicteric, no conjunctival injection, no discharge  Oropharynx: no lesions or injection 	  Neck: supple, without adenopathy  Lungs: clear to auscultation  Heart: regular rate and rhythm; no murmur, rubs or gallops  Abdomen: soft, nondistended, nontender, without mass or organomegaly  Skin: no lesions  Extremities: no clubbing, cyanosis, or edema  Neurologic: alert, contracted    LAB RESULTS:                        8.3    20.21 )-----------( 432      ( 11 Mar 2019 08:09 )             28.3     03-12    133<L>  |  97  |  31<H>  ----------------------------<  149<H>  4.7   |  22  |  0.32<L>    Ca    8.8      12 Mar 2019 06:21        Urinalysis Basic - ( 12 Mar 2019 03:24 )    Color: Yellow / Appearance: Clear / S.024 / pH: x  Gluc: x / Ketone: Negative  / Bili: Negative / Urobili: <2 mg/dL   Blood: x / Protein: Trace / Nitrite: Negative   Leuk Esterase: Negative / RBC: x / WBC x   Sq Epi: x / Non Sq Epi: x / Bacteria: x      MICROBIOLOGY:  RECENT CULTURES:  03-10 @ 10:06 .Blood Blood     No growth to date.          RADIOLOGY REVIEWED:  < from: NM Inflammatory Loc Wholebody, WBC (19 @ 10:04) >  IMPRESSION:  Normal combined Indium-111 labeled leukocyte study and   marrow scan.    No scan evidence of infection.    Etiology of fever is not elucidated in this study.    < from: CT Head No Cont (19 @ 22:39) >    IMPRESSION:    No acute intracranial hemorrhage, mass effect, vasogenic edema, or   evidence of acute territorial infarct.    Slight interval enlargement of the ventricles and sulci, consistent with   progressive atrophy.    Slightly increased nonspecific periventricular lucency may represent   white matter microvascular ischemic disease.      < end of copied text >    < end of copied text >  < from: CT Abdomen and Pelvis w/ Oral Cont (19 @ 08:46) >  IMPRESSION:   Limited exam.    Left-sided sacral decubitus ulcer without definite cortical erosion of   the underlying sacrum. MRI would be more sensitive to evaluate for   osteomyelitis.    No acute intra-abdominal pathology or collection.      < end of copied text >      Assessment:  Patient with advanced dementia, nearly vegetative state, has spent nearly a year mostly in but some small amount of time out of the hospital after mrsa thr infection , peg, trach, stage 4 decube. Came back a couple of weeks ago with septic shock and no particularly overt source was apparent. Now having fevers and high wbc and still no specific source of infection apparent. I suspect the decubitus may be cause but await result of lap and ferritin to see if any possible mpd or still disease. indium suprisingly normal  Plan:  monitor off antibiotics  reculture if further fever  await lap and ferritin

## 2019-03-12 NOTE — CONSULT NOTE ADULT - ATTENDING COMMENTS
Agree with above. Pt with end stage dementia, non verbal with evidence of leukocytosis and FUO.   Would not pursue any invasive testing such a BM bx as it will not . Pt is not a candidate for DMT if a malignancy is identified.   Recommend treatment aimed at managing symptoms.   Can do a basic work up for nutritional deficiencies  Also possible that fevers are related to pt's significant sacral ulcers.

## 2019-03-12 NOTE — CONSULT NOTE ADULT - ASSESSMENT
69 y/o F w/ pmhx notable for advanced dementia w/ severely impaired functional and cognitive impairment (nonverbal, noncommunicative, chronic gastrostomy tube, bed confined with functional quadriplegia), multiple sacral and hip decubitus ulcers, hx of recent hip MRSA infection and multiple recent hospitalizations who presented with fevers for the 2 days prior to admission, unclear cause. No source of infection identified.    1) Leukocytosis/Fever- Differential includes infection vs indolent malignancy vs inflammatory   - extensive workup for infection unrevealing, including CT C/A/P and tagged WBC scan  - now being monitored off of antibiotics  - no LAD on CT scans or sign of malignancy. Can consider checking LDH. Will send flow cytometry to evaluate for lymphoproliferative disorder although less likely  - discussed role of bone marrow biopsy, however  would like to defer for now. Even if occult malignancy was found, pt not likely treatment candidate  - pt underwent rheumatologic workup last admission, which was negative making connective tissue disorder less likely   - most likely response to chronic inflammatory state with chronic decubitus ulcers and multiple past infections  - would continue to treat supportively and trend cbc w/ diff      Ranjit Barker, PGY-4  Hematology-Oncology Fellow  [phone] ([place]) [phone] ([facility]) 67 y/o F w/ pmhx notable for advanced dementia w/ severely impaired functional and cognitive impairment (nonverbal, noncommunicative, chronic gastrostomy tube, bed confined with functional quadriplegia), multiple sacral and hip decubitus ulcers, hx of recent hip MRSA infection and multiple recent hospitalizations who presented with fevers for the 2 days prior to admission, unclear cause. No source of infection identified.    1) Leukocytosis/Fever- Differential includes infection vs indolent malignancy vs inflammatory   - extensive workup for infection unrevealing, including CT C/A/P and tagged WBC scan  - now being monitored off of antibiotics  - no LAD on CT scans or sign of malignancy. Can consider checking LDH.   - Can consider flow cytometry to evaluate for lymphoproliferative disorder although less likely as well as bone marrow biopsy; however,  would like to defer for now. Even if occult malignancy was found, pt not likely treatment candidate. Results from this could lead to more unnecessary invasive testing   - pt underwent rheumatologic workup last admission, which was negative making connective tissue disorder less likely   - most likely response to chronic inflammatory state with chronic decubitus ulcers and multiple past infections  - can r/o reversible nutritional deficiencies such as iron studies/ferritin/B12/folate  - would continue to treat supportively and trend cbc w/ diff      Ranjit Barker, PGY-4  Hematology-Oncology Fellow  [phone] ([place]) [phone] ([facility]) 67 y/o F w/ pmhx notable for advanced dementia w/ severely impaired functional and cognitive impairment (nonverbal, noncommunicative, chronic gastrostomy tube, bed confined with functional quadriplegia), multiple sacral and hip decubitus ulcers, hx of recent hip MRSA infection and multiple recent hospitalizations who presented with fevers for the 2 days prior to admission, unclear cause. No source of infection identified.    1) Leukocytosis/Fever- Differential includes infection vs indolent malignancy vs inflammatory   - extensive workup for infection unrevealing, including CT C/A/P and tagged WBC scan  - now being monitored off of antibiotics  - no LAD on CT scans or sign of malignancy  - Would normally consider consider flow cytometry to evaluate for lymphoproliferative disorder although less likely as well as bone marrow biopsy; however,  would like to defer for now. Even if occult malignancy was found, pt not likely treatment candidate. Results from this could lead to more unnecessary invasive testing   - pt underwent rheumatologic workup last admission, which was negative making connective tissue disorder less likely   - most likely response to chronic inflammatory state with chronic decubitus ulcers and multiple past infections  - can r/o reversible nutritional deficiencies such as iron studies/ferritin/B12/folate  - would continue to treat supportively and trend cbc w/ diff      Ranjit Barker, PGY-4  Hematology-Oncology Fellow  [phone] ([place]) [phone] ([facility])

## 2019-03-13 LAB
BLD GP AB SCN SERPL QL: NEGATIVE — SIGNIFICANT CHANGE UP
GLUCOSE BLDC GLUCOMTR-MCNC: 104 MG/DL — HIGH (ref 70–99)
GLUCOSE BLDC GLUCOMTR-MCNC: 112 MG/DL — HIGH (ref 70–99)
GLUCOSE BLDC GLUCOMTR-MCNC: 133 MG/DL — HIGH (ref 70–99)
GLUCOSE BLDC GLUCOMTR-MCNC: 186 MG/DL — HIGH (ref 70–99)
HCT VFR BLD CALC: 21.3 % — LOW (ref 34.5–45)
HCT VFR BLD CALC: 23.1 % — LOW (ref 34.5–45)
HGB BLD-MCNC: 6.6 G/DL — CRITICAL LOW (ref 11.5–15.5)
HGB BLD-MCNC: 7.5 G/DL — LOW (ref 11.5–15.5)
MCHC RBC-ENTMCNC: 29.3 PG — SIGNIFICANT CHANGE UP (ref 27–34)
MCHC RBC-ENTMCNC: 29.9 PG — SIGNIFICANT CHANGE UP (ref 27–34)
MCHC RBC-ENTMCNC: 31 GM/DL — LOW (ref 32–36)
MCHC RBC-ENTMCNC: 32.6 GM/DL — SIGNIFICANT CHANGE UP (ref 32–36)
MCV RBC AUTO: 91.7 FL — SIGNIFICANT CHANGE UP (ref 80–100)
MCV RBC AUTO: 94.7 FL — SIGNIFICANT CHANGE UP (ref 80–100)
PLATELET # BLD AUTO: 379 K/UL — SIGNIFICANT CHANGE UP (ref 150–400)
PLATELET # BLD AUTO: 395 K/UL — SIGNIFICANT CHANGE UP (ref 150–400)
RBC # BLD: 2.25 M/UL — LOW (ref 3.8–5.2)
RBC # BLD: 2.52 M/UL — LOW (ref 3.8–5.2)
RBC # FLD: 15.4 % — HIGH (ref 10.3–14.5)
RBC # FLD: 16.2 % — HIGH (ref 10.3–14.5)
RH IG SCN BLD-IMP: NEGATIVE — SIGNIFICANT CHANGE UP
WBC # BLD: 14.46 K/UL — HIGH (ref 3.8–10.5)
WBC # BLD: 17 K/UL — HIGH (ref 3.8–10.5)
WBC # FLD AUTO: 14.46 K/UL — HIGH (ref 3.8–10.5)
WBC # FLD AUTO: 17 K/UL — HIGH (ref 3.8–10.5)

## 2019-03-13 PROCEDURE — 99232 SBSQ HOSP IP/OBS MODERATE 35: CPT

## 2019-03-13 PROCEDURE — 99223 1ST HOSP IP/OBS HIGH 75: CPT

## 2019-03-13 RX ADMIN — Medication 5 MILLILITER(S): at 17:57

## 2019-03-13 RX ADMIN — Medication 1: at 12:41

## 2019-03-13 RX ADMIN — APIXABAN 5 MILLIGRAM(S): 2.5 TABLET, FILM COATED ORAL at 10:41

## 2019-03-13 RX ADMIN — Medication 1 DROP(S): at 00:32

## 2019-03-13 RX ADMIN — APIXABAN 5 MILLIGRAM(S): 2.5 TABLET, FILM COATED ORAL at 22:18

## 2019-03-13 RX ADMIN — Medication 300 MILLIGRAM(S): at 12:41

## 2019-03-13 RX ADMIN — Medication 3 MILLILITER(S): at 12:42

## 2019-03-13 RX ADMIN — GABAPENTIN 300 MILLIGRAM(S): 400 CAPSULE ORAL at 10:42

## 2019-03-13 RX ADMIN — GABAPENTIN 300 MILLIGRAM(S): 400 CAPSULE ORAL at 00:32

## 2019-03-13 RX ADMIN — Medication 1 DROP(S): at 12:37

## 2019-03-13 RX ADMIN — GABAPENTIN 300 MILLIGRAM(S): 400 CAPSULE ORAL at 22:18

## 2019-03-13 RX ADMIN — QUETIAPINE FUMARATE 25 MILLIGRAM(S): 200 TABLET, FILM COATED ORAL at 22:18

## 2019-03-13 RX ADMIN — Medication 1 APPLICATION(S): at 05:24

## 2019-03-13 RX ADMIN — FAMOTIDINE 20 MILLIGRAM(S): 10 INJECTION INTRAVENOUS at 12:41

## 2019-03-13 RX ADMIN — Medication 1 DROP(S): at 17:56

## 2019-03-13 RX ADMIN — Medication 1.5 MILLIGRAM(S): at 22:18

## 2019-03-13 RX ADMIN — Medication 3 MILLILITER(S): at 05:23

## 2019-03-13 RX ADMIN — Medication 3 MILLILITER(S): at 17:57

## 2019-03-13 RX ADMIN — Medication 5 MILLILITER(S): at 05:24

## 2019-03-13 RX ADMIN — Medication 3 MILLILITER(S): at 00:33

## 2019-03-13 RX ADMIN — Medication 1 DROP(S): at 05:24

## 2019-03-13 RX ADMIN — Medication 7.5 MILLIGRAM(S): at 05:24

## 2019-03-13 RX ADMIN — Medication 1 APPLICATION(S): at 17:57

## 2019-03-13 NOTE — PROGRESS NOTE ADULT - SUBJECTIVE AND OBJECTIVE BOX
---___---___---___---___---___---___ ---___---___---___---___---___---___---___---___---___---                    <<<  M E D I C A L   A T T E N D I N G    F O L L O W    U P   N O T E  >>>    still with fever and  does not want any invasive procedures      ---___---___---___---___---___---        <<<  MEDICATIONS:  >>>    MEDICATIONS  (STANDING):  ALBUTerol    90 MICROgram(s) HFA Inhaler 1 Puff(s) Inhalation every 4 hours  ALBUTerol/ipratropium for Nebulization 3 milliLiter(s) Nebulizer every 6 hours  apixaban 5 milliGRAM(s) Oral every 12 hours  artificial  tears Solution 1 Drop(s) Both EYES every 6 hours  Biotene Dry Mouth Oral Rinse 5 milliLiter(s) Swish and Spit two times a day  clonazePAM Tablet 1.5 milliGRAM(s) Oral at bedtime  Dakins Solution - 1/4 Strength 1 Application(s) Topical two times a day  dextrose 5%. 1000 milliLiter(s) (50 mL/Hr) IV Continuous <Continuous>  dextrose 50% Injectable 12.5 Gram(s) IV Push once  dextrose 50% Injectable 25 Gram(s) IV Push once  dextrose 50% Injectable 25 Gram(s) IV Push once  famotidine    Tablet 20 milliGRAM(s) Oral daily  ferrous    sulfate Liquid 300 milliGRAM(s) Oral daily  gabapentin   Solution 300 milliGRAM(s) Oral two times a day  insulin lispro (HumaLOG) corrective regimen sliding scale   SubCutaneous every 6 hours  lisinopril 2.5 milliGRAM(s) Oral daily  Medical Marijuana Oil 1 milliLiter(s),Medical Marijuana Oil 1 milliLiter(s),Medical Marijuana Oil 1 milliLiter(s),Medical Marijuana Oil 1 milliLiter(s) 1 milliLiter(s) Oral <User Schedule>  predniSONE   Tablet 7.5 milliGRAM(s) Oral daily  QUEtiapine 25 milliGRAM(s) Oral at bedtime  tiotropium 18 MICROgram(s) Capsule 1 Capsule(s) Inhalation daily      MEDICATIONS  (PRN):  acetaminophen    Suspension .. 650 milliGRAM(s) Oral every 6 hours PRN Temp greater or equal to 38C (100.4F)  dextrose 40% Gel 15 Gram(s) Oral once PRN Blood Glucose LESS THAN 70 milliGRAM(s)/deciliter  glucagon  Injectable 1 milliGRAM(s) IntraMuscular once PRN Glucose LESS THAN 70 milligrams/deciliter  sodium chloride 0.65% Nasal 1 Spray(s) Both Nostrils two times a day PRN Nasal Congestion       ---___---___---___---___---___---     <<<REVIEW OF SYSTEM: >>>  unable to obtain    ---___---___---___---___---___---          <<<  VITAL SIGNS: >>>    T(F): 97.8 (19 @ 13:54), Max: 101 (19 @ 20:42)  HR: 111 (19 @ 13:54) (97 - 122)  BP: 111/69 (19 @ 13:54) (93/63 - 111/69)  RR: 18 (19 @ 13:54) (18 - 18)  SpO2: 99% (19 @ 09:34) (95% - 100%)  Wt(kg): --  CAPILLARY BLOOD GLUCOSE      POCT Blood Glucose.: 186 mg/dL (13 Mar 2019 12:14)    I&O's Summary    12 Mar 2019 07:01  -  13 Mar 2019 07:00  --------------------------------------------------------  IN: 740 mL / OUT: 550 mL / NET: 190 mL    13 Mar 2019 07:  -  13 Mar 2019 18:14  --------------------------------------------------------  IN: 0 mL / OUT: 850 mL / NET: -850 mL         ---___---___---___---___---___---                       PHYSICAL EXAM:    GEN: A&O X 0 , NAD , comfortable  HEENT: NCAT, PERRL, MMM, no scleral icterus, hearing intact  NECK: Supple, No JVD  CVS: S1S2 , regular , No M/R/G appreciated  PULM: CTA B/L,  no W/R/R appreciated  ABD.: soft. non tender, non distended,  bowel sounds present  Extrem: intact pulses , no edema noted  Dermsacral decubitus noted       ---___---___---___---___---___---     <<<  LAB AND IMAGING: >>>                          7.5    17.0  )-----------( 395      ( 13 Mar 2019 11:47 )             23.1               03-12    133<L>  |  97  |  31<H>  ----------------------------<  149<H>  4.7   |  22  |  0.32<L>    Ca    8.8      12 Mar 2019 06:21             Urinalysis Basic - ( 12 Mar 2019 03:24 )    Color: Yellow / Appearance: Clear / S.024 / pH: x  Gluc: x / Ketone: Negative  / Bili: Negative / Urobili: <2 mg/dL   Blood: x / Protein: Trace / Nitrite: Negative   Leuk Esterase: Negative / RBC: x / WBC x   Sq Epi: x / Non Sq Epi: x / Bacteria: x                        [All pertinent / recent available Imaging reports and other labs reviewed]     ---___---___---___---___---___---___ ---___---___---___---___---           <<<  A S S E S S M E N T   A N D   P L A N :  >>>    fever continue to monitor  second opinion  ID consult appreciated   leukocytosis as above   asthma  continue budesonide   agitation  continue seroquel and klonopin   dementia  supportive care  h/o hoip spacer     -GI/DVT Prophylaxis.    --------------------------------------------  Case discussed with   Education given on     >>______________________<<      Deniz Bolden .         phone   [phone]

## 2019-03-13 NOTE — PROGRESS NOTE ADULT - SUBJECTIVE AND OBJECTIVE BOX
SUBJECTIVE: Pt seen, chart reviewed.  requested by  to re evaluate patient  Intermittent fever with leukocytosis noted  Following visit, + BC reported    Allergies    ASA; dye contrast (Anaphylaxis)  aspirin (Short breath)  divalproex sodium (Other (Unknown))  Haldol (Other (Unknown))  penicillin (Short breath; Rash)  sulfa drugs (Short breath; Rash)  vancomycin (Rash; Urticaria; Hives)  Xanax (Other (Unknown))    Intolerances        apixaban 5 milliGRAM(s) Oral every 12 hours      PAST MEDICAL & SURGICAL HISTORY:  CVA (cerebral vascular accident)  Fatty pancreas  PNA (pneumonia)  Pulmonary HTN  IGT (impaired glucose tolerance)  Ulcerative colitis  Acid reflux  Anxiety  Depression  Mouth sores  HLD (hyperlipidemia)  Asthma  Humeral head fracture  H/O: hysterectomy  H/O cataract extraction, left  History of knee replacement      HEALTH ISSUES - PROBLEM Dx:  PEG (percutaneous endoscopic gastrostomy) status: PEG (percutaneous endoscopic gastrostomy) status  Asthma, unspecified asthma severity, unspecified whether complicated, unspecified whether persistent: Asthma, unspecified asthma severity, unspecified whether complicated, unspecified whether persistent  History of pulmonary embolism: History of pulmonary embolism  Dementia without behavioral disturbance, unspecified dementia type: Dementia without behavioral disturbance, unspecified dementia type  Pressure injury of sacral region, unstageable: Pressure injury of sacral region, unstageable          FAMILY HISTORY:  Family history of dementia (Grandparent)  Family history of colon cancer (Grandparent)      Physical Exam:  Vital Signs Last 24 Hrs  T(C): 37.8 (13 Mar 2019 09:34), Max: 38.3 (12 Mar 2019 20:42)  T(F): 100 (13 Mar 2019 09:34), Max: 101 (12 Mar 2019 20:42)  HR: 100 (13 Mar 2019 09:34) (97 - 122)  BP: 100/60 (13 Mar 2019 09:34) (93/63 - 108/63)  BP(mean): --  RR: 18 (13 Mar 2019 09:34) (18 - 18)  SpO2: 99% (13 Mar 2019 09:34) (95% - 100%)    Examination done with  present  patient remains non toxic , poorly responsive non verbal, contracted , with chronic [place]  Sacral wound remains a Stage 4 with a very small fragment of intact bone present in wound  No odor  remains incontinent of stool   Status d/w     LABS:                        7.5    17.0  )-----------( 395      ( 13 Mar 2019 11:47 )             23.1           Outpatient follow up information provided- [phone]

## 2019-03-13 NOTE — CHART NOTE - NSCHARTNOTEFT_GEN_A_CORE
Nutrition Follow-Up:    Source: Chart reviewed, events noted; RN    Assessment: Pt seen for EN request per RN and family.      Plan & Interventions:   Change Glucerna 1.2 to 200 mL q 4 hrs + 2 no carb prosource/day   	Provides total: 1200 mL, 1,159 mL free water, 1440 kcal, 102 gm protein  	Provides per kg current weight (40.8 kg): 35 kcal/kg, 2.5 gm protein/kg       Monitoring & Evaluation:   Continue to monitor nutritional intake, tolerance to diet prescription, weights, labs & skin integrity  RDN to remain available upon request and will follow up as per protocol Nutrition Follow-Up:    Source: Chart reviewed, events noted; RN    Assessment: Pt seen for EN request per RN and family.      Plan & Interventions:   1) Change Glucerna 1.2 to 200 mL q 4 hrs + 2 no carb prosource/day   	Provides total: 1200 mL, 1,159 mL free water, 1440 kcal, 102 gm protein  	Provides per kg current weight (40.8 kg): 35 kcal/kg, 2.5 gm protein/kg   2) Reinforced with RN to have bolus regimen coincide with finger stick schedule: 12am, 4am, 8am 12pm, 4pm, 8pm  3) Continue DanActive twice daily per protocol    Monitoring & Evaluation:   Continue to monitor nutritional intake, tolerance to diet prescription, weights, labs & skin integrity  RDN to remain available upon request and will follow up as per protocol Nutrition Follow-Up:    Source: Chart reviewed, events noted; RN    Assessment: Pt seen for EN request per RN and family.      Plan & Interventions:   1) Change Glucerna 1.2 to 270 mL q 4 hrs bolus (8am, 12pm, 4pm, 8pm) + 2 no carb prosource/day   	Provides total: 1080 mL, 869 mL free water, 1296 kcal, 94.8 gm protein  	Provides per kg current weight (40.8 kg): 32 kcal/kg, 2.3 gm protein/kg   2) Reinforced with RN to have bolus regimen coincide with finger stick schedule  3) Continue DanActive twice daily per protocol    Monitoring & Evaluation:   Continue to monitor nutritional intake, tolerance to diet prescription, weights, labs & skin integrity  RDN to remain available upon request and will follow up as per protocol Nutrition Follow-Up:    Source: Chart reviewed, events noted; RN    Assessment: Pt seen for EN request per RN and family to change bolus from q6 to q4 during the day, as Pt was receiving glucerna 1.2 240mL q4 hours from .      Plan & Interventions:   1) Change Glucerna 1.2 to 270 mL q 4 hrs bolus (8am, 12pm, 4pm, 8pm) + 2 no carb prosource/day   	Provides total: 1080 mL, 869 mL free water, 1296 kcal, 94.8 gm protein  	Provides per kg current weight (40.8 kg): 32 kcal/kg, 2.3 gm protein/kg   2) Reinforced with RN to have bolus regimen coincide with finger stick schedule  3) Continue DanActive twice daily per protocol    Monitoring & Evaluation:   Continue to monitor nutritional intake, tolerance to diet prescription, weights, labs & skin integrity  RDN to remain available upon request and will follow up as per protocol

## 2019-03-13 NOTE — CONSULT NOTE ADULT - SUBJECTIVE AND OBJECTIVE BOX
Patient is a 68y old  Female who presents with a chief complaint of Fevers for the past 2 days at home (13 Mar 2019 12:18)    HPI:  NIGHT HOSPITALIST:   Patient UNKNOWN to me previously, assigned to me at this point via the ER and by Dr. Bolden to admit this 67 y/o F--patient seen with patient's spouse and adult daughters in attendance--patient with a history of severely impaired functional and cognitive impairment in the setting of advanced dementia (nonverbal, noncommunicative, chronic gastrostomy tube, bed confined with functional quadriplegia, un stage able sacral and RIGHT hip decubiti, heel ulcers, presumed asthma on a recent tapering solumedrol but is normally apparently steroid dependent on 7.5 mg Prednisone daily, past RIGHT hip MRSA infection, apparent systolic LV dysfunction, benzodiazepine dependent (see [facility] # [id number]) and on skeletal muscle relaxants, multiple recent hospitalizations, with the spouse and adult daughters referring spouse following fevers for the past 2 days and coughing at home.  Loose but formed BM and no recent antibiotic use. (26 Feb 2019 23:06)      PAST MEDICAL & SURGICAL HISTORY:  CVA (cerebral vascular accident)  Fatty pancreas  PNA (pneumonia)  Pulmonary HTN  IGT (impaired glucose tolerance)  Ulcerative colitis  Acid reflux  Anxiety  Depression  Mouth sores  HLD (hyperlipidemia)  Asthma  Humeral head fracture  H/O: hysterectomy  H/O cataract extraction, left  History of knee replacement      Social history:    FAMILY HISTORY:  Family history of dementia (Grandparent)  Family history of colon cancer (Grandparent)                    Allergic/Immunologic:	No hives or rash   Allergies    ASA; dye contrast (Anaphylaxis)  aspirin (Short breath)  divalproex sodium (Other (Unknown))  Haldol (Other (Unknown))  penicillin (Short breath; Rash)  sulfa drugs (Short breath; Rash)  vancomycin (Rash; Urticaria; Hives)  Xanax (Other (Unknown))    Intolerances        Antimicrobials:          Vital Signs Last 24 Hrs  T(C): 36.6 (13 Mar 2019 13:54), Max: 38.3 (12 Mar 2019 20:42)  T(F): 97.8 (13 Mar 2019 13:54), Max: 101 (12 Mar 2019 20:42)  HR: 111 (13 Mar 2019 13:54) (97 - 122)  BP: 111/69 (13 Mar 2019 13:54) (93/63 - 111/69)  BP(mean): --  RR: 18 (13 Mar 2019 13:54) (18 - 18)  SpO2: 99% (13 Mar 2019 09:34) (95% - 100%)    PHYSICAL EXAM:Pleasant patient in no acute distress.          cachexia non communicative     Eyes:PERRL EOMI.NO discharge or conjunctival injection    ENMT:No sinus tenderness.No thrush.No pharyngeal exudate or erythema.Fair dental hygiene    Neck:Supple,No LN,no JVD           Cardiovascular:S1 S2 wnl, No murmurs,rub or gallops    Gastrointestinal:Soft BS(+) no tenderness no masses ,No rebound or guarding    Genitourinary:No CVA tendereness     Rectal:    Extremities multiple ulcersation on skin and back  Skin:No rash     Lymph Nodes:No palpable LNs    Musculoskeletal:No joint swelling or LOM    Psychiatric:Affect normal.                                7.5    17.0  )-----------( 395      ( 13 Mar 2019 11:47 )             23.1         03-12    133<L>  |  97  |  31<H>  ----------------------------<  149<H>  4.7   |  22  |  0.32<L>    Ca    8.8      12 Mar 2019 06:21        RECENT CULTURES:  03-10 @ 10:06  .Blood Blood  --  --  --    No growth to date.  --  03-07 @ 00:38  .Blood Blood-Peripheral  --  --  --    No growth at 5 days.  --      MICROBIOLOGY:  Culture Results:   No growth to date. (03-10 @ 10:06)  Culture Results:   No growth to date. (03-10 @ 10:06)  Culture Results:   No growth at 5 days. (03-07 @ 00:38)  Culture Results:   No growth at 5 days. (03-07 @ 00:38)          Radiology:      Assessment:        Recommendations and Plan:    Pager [phone]  After 5 pm/weekends or if no response :[phone]

## 2019-03-13 NOTE — CONSULT NOTE ADULT - ASSESSMENT
Asked to see fro second opinion regarding ongoing fever and leucocytosis .  History reviewed and discussed with  in detail  Cause of fever is unclear, and there could be an occult myeloproliferative process.   However, the  relates that the fever has been going on for months and I wonder if it is being caused  by a chronic MRSA infection in the right hip spacer. The spacer has been in place for about 9 months and she initially received 6 weeks of therapy. The only solution to this would be to change the spacer and get cultures but the  does not want to put her through surgery which is certainly appropriate . Of note, , it appears that fever curve was maybe lower when she was receiving dapto between 3/3- 3/8 .  He also doesn't ot want any invasive diagnostic workup    suggest po minocycline  100 mg q 12 hrs  ( the MRSA isolate was sensitive) and suppression of fever with antipyretics.   She has advanced dementia, and comfort care seems reasonable

## 2019-03-14 ENCOUNTER — TRANSCRIPTION ENCOUNTER (OUTPATIENT)
Age: 69
End: 2019-03-14

## 2019-03-14 LAB
GLUCOSE BLDC GLUCOMTR-MCNC: 104 MG/DL — HIGH (ref 70–99)
GLUCOSE BLDC GLUCOMTR-MCNC: 108 MG/DL — HIGH (ref 70–99)
GLUCOSE BLDC GLUCOMTR-MCNC: 119 MG/DL — HIGH (ref 70–99)
GLUCOSE BLDC GLUCOMTR-MCNC: 141 MG/DL — HIGH (ref 70–99)
GLUCOSE BLDC GLUCOMTR-MCNC: 164 MG/DL — HIGH (ref 70–99)
GLUCOSE BLDC GLUCOMTR-MCNC: 96 MG/DL — SIGNIFICANT CHANGE UP (ref 70–99)
GLUCOSE BLDC GLUCOMTR-MCNC: 98 MG/DL — SIGNIFICANT CHANGE UP (ref 70–99)
HCT VFR BLD CALC: 20.7 % — CRITICAL LOW (ref 34.5–45)
HGB BLD-MCNC: 7.3 G/DL — LOW (ref 11.5–15.5)
LACTATE SERPL-SCNC: 2 MMOL/L — SIGNIFICANT CHANGE UP (ref 0.7–2)
MCHC RBC-ENTMCNC: 31.8 PG — SIGNIFICANT CHANGE UP (ref 27–34)
MCHC RBC-ENTMCNC: 35.4 GM/DL — SIGNIFICANT CHANGE UP (ref 32–36)
MCV RBC AUTO: 89.7 FL — SIGNIFICANT CHANGE UP (ref 80–100)
PLATELET # BLD AUTO: 388 K/UL — SIGNIFICANT CHANGE UP (ref 150–400)
RBC # BLD: 2.3 M/UL — LOW (ref 3.8–5.2)
RBC # FLD: 14.9 % — HIGH (ref 10.3–14.5)
WBC # BLD: 11 K/UL — HIGH (ref 3.8–10.5)
WBC # FLD AUTO: 11 K/UL — HIGH (ref 3.8–10.5)

## 2019-03-14 PROCEDURE — 71045 X-RAY EXAM CHEST 1 VIEW: CPT | Mod: 26

## 2019-03-14 RX ORDER — MINOCYCLINE HYDROCHLORIDE 45 MG/1
100 TABLET, EXTENDED RELEASE ORAL
Qty: 0 | Refills: 0 | Status: DISCONTINUED | OUTPATIENT
Start: 2019-03-14 | End: 2019-03-18

## 2019-03-14 RX ADMIN — APIXABAN 5 MILLIGRAM(S): 2.5 TABLET, FILM COATED ORAL at 10:39

## 2019-03-14 RX ADMIN — LISINOPRIL 2.5 MILLIGRAM(S): 2.5 TABLET ORAL at 05:38

## 2019-03-14 RX ADMIN — Medication 1 DROP(S): at 12:42

## 2019-03-14 RX ADMIN — Medication 5 MILLILITER(S): at 10:06

## 2019-03-14 RX ADMIN — Medication 1: at 12:44

## 2019-03-14 RX ADMIN — Medication 650 MILLIGRAM(S): at 21:34

## 2019-03-14 RX ADMIN — Medication 1 DROP(S): at 05:39

## 2019-03-14 RX ADMIN — Medication 3 MILLILITER(S): at 12:23

## 2019-03-14 RX ADMIN — QUETIAPINE FUMARATE 25 MILLIGRAM(S): 200 TABLET, FILM COATED ORAL at 21:34

## 2019-03-14 RX ADMIN — Medication 7.5 MILLIGRAM(S): at 05:39

## 2019-03-14 RX ADMIN — Medication 300 MILLIGRAM(S): at 12:42

## 2019-03-14 RX ADMIN — Medication 5 MILLILITER(S): at 17:13

## 2019-03-14 RX ADMIN — FAMOTIDINE 20 MILLIGRAM(S): 10 INJECTION INTRAVENOUS at 12:42

## 2019-03-14 RX ADMIN — Medication 1 APPLICATION(S): at 05:39

## 2019-03-14 RX ADMIN — Medication 1 APPLICATION(S): at 17:14

## 2019-03-14 RX ADMIN — Medication 1.5 MILLIGRAM(S): at 21:33

## 2019-03-14 RX ADMIN — APIXABAN 5 MILLIGRAM(S): 2.5 TABLET, FILM COATED ORAL at 21:34

## 2019-03-14 RX ADMIN — Medication 3 MILLILITER(S): at 17:14

## 2019-03-14 RX ADMIN — GABAPENTIN 300 MILLIGRAM(S): 400 CAPSULE ORAL at 10:39

## 2019-03-14 RX ADMIN — GABAPENTIN 300 MILLIGRAM(S): 400 CAPSULE ORAL at 21:34

## 2019-03-14 RX ADMIN — Medication 1 DROP(S): at 17:13

## 2019-03-14 RX ADMIN — MINOCYCLINE HYDROCHLORIDE 100 MILLIGRAM(S): 45 TABLET, EXTENDED RELEASE ORAL at 18:38

## 2019-03-14 NOTE — PROGRESS NOTE ADULT - SUBJECTIVE AND OBJECTIVE BOX
CC: f/u for  fever  Patient reports  nothing intelligible  REVIEW OF SYSTEMS:  All other review of systems cannot get (Comprehensive ROS)    Antimicrobials Day #  :  minocycline 100 milliGRAM(s) Oral two times a day    Other Medications Reviewed    T(F): 97.7 (03-14-19 @ 10:00), Max: 99.2 (03-13-19 @ 20:55)  HR: 107 (03-14-19 @ 14:05)  BP: 129/71 (03-14-19 @ 14:05)  RR: 18 (03-14-19 @ 14:05)  SpO2: 96% (03-14-19 @ 14:05)  Wt(kg): --    PHYSICAL EXAM:  General: awake, no acute distress  Eyes:  anicteric, no conjunctival injection, no discharge  Oropharynx: no lesions or injection 	  Neck: supple, without adenopathy  Lungs: clear to auscultation  Heart: regular rate and rhythm; no murmur, rubs or gallops  Abdomen: soft, nondistended, nontender, without mass or organomegaly, peg  Skin: no lesions  Extremities: no clubbing, cyanosis, or edema. no pain with rom right hip  Neurologic: contracted on side in bed    LAB RESULTS:                        7.3    11.0  )-----------( 388      ( 14 Mar 2019 05:48 )             20.7               MICROBIOLOGY:  RECENT CULTURES:  03-10 @ 10:06 .Blood Blood     No growth to date.          RADIOLOGY REVIEWED:    < from: [facility] (03.12.19 @ 10:04) >  IMPRESSION:  Normal combined Indium-111 labeled leukocyte study and   marrow scan.    No scan evidence of infection.    Etiology of fever is not elucidated in this study.    < end of copied text >    Assessment:  Patient with advanced dementia, bedbound, contracted, stage 4 sacral decube, mrsa right thr infection s/p leisa and spacer a year ago , maintained on suppressive minocycline, recurrent admissions and sepsis events from uti and aspiration, returns with severe sepsis, no specific source found , got empiric abx, did this despite minocycline suppression now with self moderating fever and leukocytosis  Plan:  will restart suppressive minocycline

## 2019-03-14 NOTE — PROGRESS NOTE ADULT - SUBJECTIVE AND OBJECTIVE BOX
---___---___---___---___---___---___ ---___---___---___---___---___---___---___---___---___---                    <<<  M E D I C A L   A T T E N D I N G    F O L L O W    U P   N O T E  >>>    no fever today, wbc decreased      ---___---___---___---___---___---      <<<  MEDICATIONS:  >>>    MEDICATIONS  (STANDING):  ALBUTerol    90 MICROgram(s) HFA Inhaler 1 Puff(s) Inhalation every 4 hours  ALBUTerol/ipratropium for Nebulization 3 milliLiter(s) Nebulizer every 6 hours  apixaban 5 milliGRAM(s) Oral every 12 hours  artificial  tears Solution 1 Drop(s) Both EYES every 6 hours  Biotene Dry Mouth Oral Rinse 5 milliLiter(s) Swish and Spit two times a day  clonazePAM Tablet 1.5 milliGRAM(s) Oral at bedtime  Dakins Solution - 1/4 Strength 1 Application(s) Topical two times a day  dextrose 5%. 1000 milliLiter(s) (50 mL/Hr) IV Continuous <Continuous>  dextrose 50% Injectable 12.5 Gram(s) IV Push once  dextrose 50% Injectable 25 Gram(s) IV Push once  dextrose 50% Injectable 25 Gram(s) IV Push once  famotidine    Tablet 20 milliGRAM(s) Oral daily  ferrous    sulfate Liquid 300 milliGRAM(s) Oral daily  gabapentin   Solution 300 milliGRAM(s) Oral two times a day  insulin lispro (HumaLOG) corrective regimen sliding scale   SubCutaneous every 6 hours  lisinopril 2.5 milliGRAM(s) Oral daily  Medical Marijuana Oil 1 milliLiter(s),Medical Marijuana Oil 1 milliLiter(s),Medical Marijuana Oil 1 milliLiter(s),Medical Marijuana Oil 1 milliLiter(s) 1 milliLiter(s) Oral <User Schedule>  predniSONE   Tablet 7.5 milliGRAM(s) Oral daily  QUEtiapine 25 milliGRAM(s) Oral at bedtime  tiotropium 18 MICROgram(s) Capsule 1 Capsule(s) Inhalation daily      MEDICATIONS  (PRN):  acetaminophen    Suspension .. 650 milliGRAM(s) Oral every 6 hours PRN Temp greater or equal to 38C (100.4F)  dextrose 40% Gel 15 Gram(s) Oral once PRN Blood Glucose LESS THAN 70 milliGRAM(s)/deciliter  glucagon  Injectable 1 milliGRAM(s) IntraMuscular once PRN Glucose LESS THAN 70 milligrams/deciliter  sodium chloride 0.65% Nasal 1 Spray(s) Both Nostrils two times a day PRN Nasal Congestion       ---___---___---___---___---___---     <<<REVIEW OF SYSTEM: >>>  unable to obtain      ---___---___---___---___---___---          <<<  VITAL SIGNS: >>>    T(F): 97.7 (03-14-19 @ 10:00), Max: 99.9 (03-13-19 @ 12:24)  HR: 105 (03-14-19 @ 10:00) (105 - 114)  BP: 113/70 (03-14-19 @ 10:00) (105/62 - 131/74)  RR: 18 (03-14-19 @ 05:33) (18 - 18)  SpO2: 98% (03-14-19 @ 05:33) (96% - 98%)  Wt(kg): --  CAPILLARY BLOOD GLUCOSE      POCT Blood Glucose.: 108 mg/dL (14 Mar 2019 05:41)    I&O's Summary    13 Mar 2019 07:01  -  14 Mar 2019 07:00  --------------------------------------------------------  IN: 1010 mL / OUT: 2050 mL / NET: -1040 mL         ---___---___---___---___---___---                       PHYSICAL EXAM:    GEN: A&O X 0, NAD , comfortable  HEENT: NCAT, PERRL, MMM, no scleral icterus, hearing intact  NECK: Supple, No JVD  CVS: S1S2 , regular , No M/R/G appreciated  PULM: CTA B/L,  no W/R/R appreciated  ABD.: soft. non tender, non distended,  bowel sounds present peg noted   Extrem: intact pulses , no edema noted contracted   Derm: No rash or ecchymosis noted  stage 4 decubitus   PSYCH: normal mood, no depression, not anxious     ---___---___---___---___---___---     <<<  LAB AND IMAGING: >>>                          7.3    11.0  )-----------( 388      ( 14 Mar 2019 05:48 )             20.7                                                [All pertinent / recent available Imaging reports and other labs reviewed]     ---___---___---___---___---___---___ ---___---___---___---___---           <<<  A S S E S S M E N T   A N D   P L A N :  >>>  sespsi seems to have resolved  will monitor fever and wbc if stable tentative dc for am   asthma   continue meds  stage 4 sacral decubitus continue wound care   agitation on seroquel   h/o chf cards folowing on ace inhibitor  h/o dvt continue low dose eliquis     spoke to lalita gardner discussed  that long term abx may not be good  due to erisk of c diff.  we should deal with problems as they come       -GI/DVT Prophylaxis.    --------------------------------------------  Case discussed with   Education given on     >>______________________<<      Deniz Bolden .         phone   [phone]

## 2019-03-14 NOTE — PROVIDER CONTACT NOTE (CRITICAL VALUE NOTIFICATION) - RECOMMENDATIONS
NP notified. Will continue to observer. Stat blood work. Will continue to observe NP notified. Will continue to observer. f/u with labs in am  Will continue to observe

## 2019-03-14 NOTE — DISCHARGE NOTE NURSING/CASE MANAGEMENT/SOCIAL WORK - NSDCDPATPORTLINK_GEN_ALL_CORE
You can access the [facility], offered by [facility], by registering with the following website: [email or web address]

## 2019-03-15 LAB
APPEARANCE UR: CLEAR — SIGNIFICANT CHANGE UP
BILIRUB UR-MCNC: NEGATIVE — SIGNIFICANT CHANGE UP
COLOR SPEC: SIGNIFICANT CHANGE UP
CULTURE RESULTS: SIGNIFICANT CHANGE UP
CULTURE RESULTS: SIGNIFICANT CHANGE UP
DIFF PNL FLD: NEGATIVE — SIGNIFICANT CHANGE UP
GLUCOSE BLDC GLUCOMTR-MCNC: 114 MG/DL — HIGH (ref 70–99)
GLUCOSE BLDC GLUCOMTR-MCNC: 143 MG/DL — HIGH (ref 70–99)
GLUCOSE BLDC GLUCOMTR-MCNC: 194 MG/DL — HIGH (ref 70–99)
GLUCOSE UR QL: NEGATIVE — SIGNIFICANT CHANGE UP
HCT VFR BLD CALC: 23.2 % — LOW (ref 34.5–45)
HGB BLD-MCNC: 7.9 G/DL — LOW (ref 11.5–15.5)
KETONES UR-MCNC: NEGATIVE — SIGNIFICANT CHANGE UP
LEUKOCYTE ESTERASE UR-ACNC: NEGATIVE — SIGNIFICANT CHANGE UP
MCHC RBC-ENTMCNC: 30.9 PG — SIGNIFICANT CHANGE UP (ref 27–34)
MCHC RBC-ENTMCNC: 34 GM/DL — SIGNIFICANT CHANGE UP (ref 32–36)
MCV RBC AUTO: 91 FL — SIGNIFICANT CHANGE UP (ref 80–100)
NITRITE UR-MCNC: NEGATIVE — SIGNIFICANT CHANGE UP
PH UR: 7 — SIGNIFICANT CHANGE UP (ref 5–8)
PLATELET # BLD AUTO: 422 K/UL — HIGH (ref 150–400)
PROT UR-MCNC: NEGATIVE — SIGNIFICANT CHANGE UP
RBC # BLD: 2.55 M/UL — LOW (ref 3.8–5.2)
RBC # FLD: 15.6 % — HIGH (ref 10.3–14.5)
SP GR SPEC: 1.01 — SIGNIFICANT CHANGE UP (ref 1.01–1.02)
SPECIMEN SOURCE: SIGNIFICANT CHANGE UP
SPECIMEN SOURCE: SIGNIFICANT CHANGE UP
UROBILINOGEN FLD QL: NEGATIVE — SIGNIFICANT CHANGE UP
WBC # BLD: 11.2 K/UL — HIGH (ref 3.8–10.5)
WBC # FLD AUTO: 11.2 K/UL — HIGH (ref 3.8–10.5)

## 2019-03-15 PROCEDURE — 99231 SBSQ HOSP IP/OBS SF/LOW 25: CPT

## 2019-03-15 RX ORDER — CLONAZEPAM 1 MG
1.5 TABLET ORAL AT BEDTIME
Qty: 0 | Refills: 0 | Status: DISCONTINUED | OUTPATIENT
Start: 2019-03-15 | End: 2019-03-18

## 2019-03-15 RX ADMIN — Medication 5 MILLILITER(S): at 05:56

## 2019-03-15 RX ADMIN — Medication 3 MILLILITER(S): at 11:03

## 2019-03-15 RX ADMIN — Medication 1: at 12:53

## 2019-03-15 RX ADMIN — Medication 1 DROP(S): at 22:13

## 2019-03-15 RX ADMIN — MINOCYCLINE HYDROCHLORIDE 100 MILLIGRAM(S): 45 TABLET, EXTENDED RELEASE ORAL at 05:54

## 2019-03-15 RX ADMIN — Medication 5 MILLILITER(S): at 19:06

## 2019-03-15 RX ADMIN — Medication 3 MILLILITER(S): at 19:09

## 2019-03-15 RX ADMIN — FAMOTIDINE 20 MILLIGRAM(S): 10 INJECTION INTRAVENOUS at 11:04

## 2019-03-15 RX ADMIN — Medication 650 MILLIGRAM(S): at 16:45

## 2019-03-15 RX ADMIN — MINOCYCLINE HYDROCHLORIDE 100 MILLIGRAM(S): 45 TABLET, EXTENDED RELEASE ORAL at 20:07

## 2019-03-15 RX ADMIN — APIXABAN 5 MILLIGRAM(S): 2.5 TABLET, FILM COATED ORAL at 22:04

## 2019-03-15 RX ADMIN — Medication 1 APPLICATION(S): at 05:56

## 2019-03-15 RX ADMIN — Medication 1.5 MILLIGRAM(S): at 22:04

## 2019-03-15 RX ADMIN — APIXABAN 5 MILLIGRAM(S): 2.5 TABLET, FILM COATED ORAL at 10:59

## 2019-03-15 RX ADMIN — Medication 3 MILLILITER(S): at 05:54

## 2019-03-15 RX ADMIN — Medication 1 APPLICATION(S): at 19:06

## 2019-03-15 RX ADMIN — QUETIAPINE FUMARATE 25 MILLIGRAM(S): 200 TABLET, FILM COATED ORAL at 22:04

## 2019-03-15 RX ADMIN — Medication 1 DROP(S): at 19:06

## 2019-03-15 RX ADMIN — Medication 650 MILLIGRAM(S): at 18:13

## 2019-03-15 RX ADMIN — GABAPENTIN 300 MILLIGRAM(S): 400 CAPSULE ORAL at 10:59

## 2019-03-15 RX ADMIN — Medication 300 MILLIGRAM(S): at 11:03

## 2019-03-15 RX ADMIN — Medication 1 DROP(S): at 05:54

## 2019-03-15 RX ADMIN — Medication 7.5 MILLIGRAM(S): at 05:54

## 2019-03-15 RX ADMIN — Medication 1 DROP(S): at 11:03

## 2019-03-15 RX ADMIN — GABAPENTIN 300 MILLIGRAM(S): 400 CAPSULE ORAL at 22:05

## 2019-03-15 RX ADMIN — LISINOPRIL 2.5 MILLIGRAM(S): 2.5 TABLET ORAL at 05:54

## 2019-03-15 NOTE — PROGRESS NOTE ADULT - SUBJECTIVE AND OBJECTIVE BOX
---___---___---___---___---___---___ ---___---___---___---___---___---___---___---___---___---                    <<<  M E D I C A L   A T T E N D I N G    F O L L O W    U P   N O T E  >>>    patient bedbound still with fever last night  started on miocycline again      ---___---___---___---___---___---      <<<  MEDICATIONS:  >>>    MEDICATIONS  (STANDING):  ALBUTerol    90 MICROgram(s) HFA Inhaler 1 Puff(s) Inhalation every 4 hours  ALBUTerol/ipratropium for Nebulization 3 milliLiter(s) Nebulizer every 6 hours  apixaban 5 milliGRAM(s) Oral every 12 hours  artificial  tears Solution 1 Drop(s) Both EYES every 6 hours  Biotene Dry Mouth Oral Rinse 5 milliLiter(s) Swish and Spit two times a day  Dakins Solution - 1/4 Strength 1 Application(s) Topical two times a day  dextrose 5%. 1000 milliLiter(s) (50 mL/Hr) IV Continuous <Continuous>  dextrose 50% Injectable 12.5 Gram(s) IV Push once  dextrose 50% Injectable 25 Gram(s) IV Push once  dextrose 50% Injectable 25 Gram(s) IV Push once  famotidine    Tablet 20 milliGRAM(s) Oral daily  ferrous    sulfate Liquid 300 milliGRAM(s) Oral daily  gabapentin   Solution 300 milliGRAM(s) Oral two times a day  insulin lispro (HumaLOG) corrective regimen sliding scale   SubCutaneous every 6 hours  lisinopril 2.5 milliGRAM(s) Oral daily  Medical Marijuana Oil 1 milliLiter(s),Medical Marijuana Oil 1 milliLiter(s),Medical Marijuana Oil 1 milliLiter(s),Medical Marijuana Oil 1 milliLiter(s) 1 milliLiter(s) Oral <User Schedule>  minocycline 100 milliGRAM(s) Oral two times a day  predniSONE   Tablet 7.5 milliGRAM(s) Oral daily  QUEtiapine 25 milliGRAM(s) Oral at bedtime  tiotropium 18 MICROgram(s) Capsule 1 Capsule(s) Inhalation daily      MEDICATIONS  (PRN):  acetaminophen    Suspension .. 650 milliGRAM(s) Oral every 6 hours PRN Temp greater or equal to 38C (100.4F)  dextrose 40% Gel 15 Gram(s) Oral once PRN Blood Glucose LESS THAN 70 milliGRAM(s)/deciliter  glucagon  Injectable 1 milliGRAM(s) IntraMuscular once PRN Glucose LESS THAN 70 milligrams/deciliter  sodium chloride 0.65% Nasal 1 Spray(s) Both Nostrils two times a day PRN Nasal Congestion       ---___---___---___---___---___---     <<<REVIEW OF SYSTEM: >>>    unable to obtain      ---___---___---___---___---___---          <<<  VITAL SIGNS: >>>    T(F): 99.3 (03-15-19 @ 05:42), Max: 101.3 (19 @ 21:30)  HR: 106 (03-15-19 @ 05:42) (105 - 109)  BP: 111/66 (03-15-19 @ 05:42) (108/67 - 129/71)  RR: 18 (03-15-19 @ 05:42) (18 - 18)  SpO2: 96% (03-15-19 @ 05:42) (96% - 96%)  Wt(kg): --  CAPILLARY BLOOD GLUCOSE      POCT Blood Glucose.: 114 mg/dL (15 Mar 2019 05:25)    I&O's Summary    14 Mar 2019 07:01  -  15 Mar 2019 07:00  --------------------------------------------------------  IN: 1190 mL / OUT: 1250 mL / NET: -60 mL         ---___---___---___---___---___---                       PHYSICAL EXAM:    GEN: A&O X 0 , NAD , comfortable  HEENT: NCAT, PERRL, MMM, no scleral icterus, hearing intact  NECK: Supple, No JVD  CVS: S1S2 , regular , No M/R/G appreciated  PULM: CTA B/L,  no W/R/R appreciated peg noted   ABD.: soft. non tender, non distended,  bowel sounds present  Extrem: intact pulses , no edema noted contracted   Derm: No rash or ecchymosis noted sacral decubitus noted   PSYCH: normal mood, no depression, not anxious     ---___---___---___---___---___---     <<<  LAB AND IMAGING: >>>                          7.9    11.2  )-----------( 422      ( 15 Mar 2019 06:00 )             23.2                            Urinalysis Basic - ( 15 Mar 2019 06:32 )    Color: Light Yellow / Appearance: Clear / S.014 / pH: x  Gluc: x / Ketone: Negative  / Bili: Negative / Urobili: Negative   Blood: x / Protein: Negative / Nitrite: Negative   Leuk Esterase: Negative / RBC: x / WBC x   Sq Epi: x / Non Sq Epi: x / Bacteria: x                        [All pertinent / recent available Imaging reports and other labs reviewed]     ---___---___---___---___---___---___ ---___---___---___---___---           <<<  A S S E S S M E N T   A N D   P L A N :  >>>    fever  still spiking no defiite soucre found multiple consults  called and following   dementia severe and supportive care  agitation continue seroquel    h/o spacer  on miniocycline for suppresive thrapy   asthma   vertebral fractures and chronic pain on  medical marijuana    -GI/DVT Prophylaxis.    --------------------------------------------  Case discussed with   Education given on     >>______________________<<      Deniz Bolden .         phone   [phone]

## 2019-03-16 LAB
GLUCOSE BLDC GLUCOMTR-MCNC: 111 MG/DL — HIGH (ref 70–99)
GLUCOSE BLDC GLUCOMTR-MCNC: 135 MG/DL — HIGH (ref 70–99)
GLUCOSE BLDC GLUCOMTR-MCNC: 138 MG/DL — HIGH (ref 70–99)
GLUCOSE BLDC GLUCOMTR-MCNC: 190 MG/DL — HIGH (ref 70–99)

## 2019-03-16 PROCEDURE — 99233 SBSQ HOSP IP/OBS HIGH 50: CPT

## 2019-03-16 RX ADMIN — GABAPENTIN 300 MILLIGRAM(S): 400 CAPSULE ORAL at 21:20

## 2019-03-16 RX ADMIN — Medication 3 MILLILITER(S): at 11:18

## 2019-03-16 RX ADMIN — APIXABAN 5 MILLIGRAM(S): 2.5 TABLET, FILM COATED ORAL at 11:17

## 2019-03-16 RX ADMIN — Medication 1 DROP(S): at 17:16

## 2019-03-16 RX ADMIN — Medication 7.5 MILLIGRAM(S): at 06:38

## 2019-03-16 RX ADMIN — GABAPENTIN 300 MILLIGRAM(S): 400 CAPSULE ORAL at 13:55

## 2019-03-16 RX ADMIN — LISINOPRIL 2.5 MILLIGRAM(S): 2.5 TABLET ORAL at 06:38

## 2019-03-16 RX ADMIN — Medication 300 MILLIGRAM(S): at 11:17

## 2019-03-16 RX ADMIN — Medication 3 MILLILITER(S): at 06:39

## 2019-03-16 RX ADMIN — Medication 3 MILLILITER(S): at 17:17

## 2019-03-16 RX ADMIN — Medication 1: at 12:50

## 2019-03-16 RX ADMIN — Medication 1 APPLICATION(S): at 17:17

## 2019-03-16 RX ADMIN — Medication 1 DROP(S): at 11:17

## 2019-03-16 RX ADMIN — Medication 1.5 MILLIGRAM(S): at 21:20

## 2019-03-16 RX ADMIN — Medication 650 MILLIGRAM(S): at 21:20

## 2019-03-16 RX ADMIN — QUETIAPINE FUMARATE 25 MILLIGRAM(S): 200 TABLET, FILM COATED ORAL at 21:20

## 2019-03-16 RX ADMIN — APIXABAN 5 MILLIGRAM(S): 2.5 TABLET, FILM COATED ORAL at 21:20

## 2019-03-16 RX ADMIN — FAMOTIDINE 20 MILLIGRAM(S): 10 INJECTION INTRAVENOUS at 11:17

## 2019-03-16 RX ADMIN — MINOCYCLINE HYDROCHLORIDE 100 MILLIGRAM(S): 45 TABLET, EXTENDED RELEASE ORAL at 17:17

## 2019-03-16 RX ADMIN — Medication 3 MILLILITER(S): at 00:06

## 2019-03-16 RX ADMIN — Medication 1 APPLICATION(S): at 11:18

## 2019-03-16 RX ADMIN — MINOCYCLINE HYDROCHLORIDE 100 MILLIGRAM(S): 45 TABLET, EXTENDED RELEASE ORAL at 06:38

## 2019-03-16 RX ADMIN — Medication 5 MILLILITER(S): at 06:39

## 2019-03-16 RX ADMIN — Medication 5 MILLILITER(S): at 14:08

## 2019-03-16 NOTE — PROGRESS NOTE ADULT - SUBJECTIVE AND OBJECTIVE BOX
HPI:  Patient seen and examined on 4 Monti with her  at bedside. She was also seen last night with her daughter at bedside.  Both times, she was more responsive than she had been previously on this admission.    Review Of Systems:   Unable to assess in the setting of advanced dementia    Medications:  acetaminophen    Suspension .. 650 milliGRAM(s) Oral every 6 hours PRN  ALBUTerol    90 MICROgram(s) HFA Inhaler 1 Puff(s) Inhalation every 4 hours  ALBUTerol/ipratropium for Nebulization 3 milliLiter(s) Nebulizer every 6 hours  apixaban 5 milliGRAM(s) Oral every 12 hours  artificial  tears Solution 1 Drop(s) Both EYES every 6 hours  Biotene Dry Mouth Oral Rinse 5 milliLiter(s) Swish and Spit two times a day  clobetasol 0.05% Ointment 1 Application(s) Topical two times a day  clonazePAM Tablet 1.5 milliGRAM(s) Oral at bedtime  Dakins Solution - 1/4 Strength 1 Application(s) Topical two times a day  dextrose 40% Gel 15 Gram(s) Oral once PRN  dextrose 5%. 1000 milliLiter(s) IV Continuous <Continuous>  dextrose 50% Injectable 12.5 Gram(s) IV Push once  dextrose 50% Injectable 25 Gram(s) IV Push once  dextrose 50% Injectable 25 Gram(s) IV Push once  famotidine    Tablet 20 milliGRAM(s) Oral daily  ferrous    sulfate Liquid 300 milliGRAM(s) Oral daily  gabapentin   Solution 300 milliGRAM(s) Oral two times a day  glucagon  Injectable 1 milliGRAM(s) IntraMuscular once PRN  insulin lispro (HumaLOG) corrective regimen sliding scale   SubCutaneous every 6 hours  lisinopril 2.5 milliGRAM(s) Oral daily  Medical Marijuana Oil 1 milliLiter(s),Medical Marijuana Oil 1 milliLiter(s),Medical Marijuana Oil 1 milliLiter(s),Medical Marijuana Oil 1 milliLiter(s) 1 milliLiter(s) Oral <User Schedule>  minocycline 100 milliGRAM(s) Oral two times a day  predniSONE   Tablet 7.5 milliGRAM(s) Oral daily  QUEtiapine 25 milliGRAM(s) Oral at bedtime  sodium chloride 0.65% Nasal 1 Spray(s) Both Nostrils two times a day PRN  tiotropium 18 MICROgram(s) Capsule 1 Capsule(s) Inhalation daily    PAST MEDICAL & SURGICAL HISTORY:  CVA (cerebral vascular accident)  Fatty pancreas  PNA (pneumonia)  Pulmonary HTN  IGT (impaired glucose tolerance)  Ulcerative colitis  Acid reflux  Anxiety  Depression  Mouth sores  HLD (hyperlipidemia)  Asthma  Humeral head fracture  H/O: hysterectomy  H/O cataract extraction, left  History of knee replacement    Vitals:  T(C): 36.6 (03-16-19 @ 11:29), Max: 38.1 (03-15-19 @ 18:13)  HR: 109 (03-16-19 @ 11:29) (108 - 109)  BP: 106/67 (03-16-19 @ 11:29) (106/67 - 118/73)  BP(mean): --  RR: 17 (03-16-19 @ 11:29) (17 - 18)  SpO2: 95% (03-16-19 @ 11:29) (95% - 96%)  Wt(kg): --  Daily     Daily   I&O's Summary    15 Mar 2019 07:01  -  16 Mar 2019 07:00  --------------------------------------------------------  IN: 1140 mL / OUT: 1250 mL / NET: -110 mL    16 Mar 2019 07:01  -  16 Mar 2019 15:50  --------------------------------------------------------  IN: 740 mL / OUT: 600 mL / NET: 140 mL        Physical Exam:  Appearance: Frail elderly; appears older than stated age; functional quadriplegic; no acute distress  Eyes: PERRL, EOMI, pink conjunctiva  HENT: Normal oral mucosa  Cardiovascular: regular tachycardia; no edema  Respiratory: Clear to auscultation bilaterally  Gastrointestinal: +feeding tub; soft, non-tender, non-distended with normal bowel sounds  Musculoskeletal: contracted  Neurologic: Nonfocal   Lymphatic: No lymphadenopathy  Psychiatry: awake  Skin: multiple pressure sores                          7.9    11.2  )-----------( 422      ( 15 Mar 2019 06:00 )             23.2             Cardiovascular Diagnostic Testing:    Echo: < from: Transthoracic Echocardiogram (12.03.18 @ 11:23) >  EF (Teicholtz): 26 %  Doppler Peak Velocity (m/sec): AoV=0.9  ------------------------------------------------------------------------  Observations:  Mitral Valve: Mitral annular calcification, otherwise  normal mitral valve. Moderate-severe mitral regurgitation  (vena contracta 0.8 cm)  Aortic Valve/Aorta: Calcified trileaflet aortic valve with  normal opening. Peak transaortic valve gradient equals 3 mm  Hg, mean transaortic valve gradient equals 1 mm Hg, aortic  valve velocity time integral equals 13 cm. Moderate aortic  regurgitation  Aortic Root: 2.9 cm.  Left Atrium: Moderately dilated left atrium.  LA volume  index = 45 cc/m2.  Left Ventricle: Severe global left ventricular systolic  dysfunction. Mild left ventricular enlargement. Increased  E/e'  is consistent with elevated left ventricular filling  pressure.  Right Heart: Moderate right atrial enlargement. Inferior  vena cava is dilated (>=2.5cm) with normal respiratory  variability consistent with right atrial pressure 16-20mm  Hg. Right ventricular enlargement with decreased right  ventricular systolic function. Normal tricuspid valve.  Severe tricuspid regurgitation. Pulmonic valve not well  visualized.  Pericardium/Pleura: Normal pericardium with no pericardial  effusion.  Hemodynamic: Estimated right atrial pressure is 8 mm Hg.  Estimated right ventricular systolic pressure equals 82 mm  Hg, assuming right atrial pressure equals 8 mm Hg,  consistent with severe pulmonary hypertension. Color  Doppler demonstrates evidence of left to right shunt  ------------------------------------------------------------------------  Conclusions:  1. Mitral annular calcification, otherwise normal mitral  valve. Moderate-severe mitral regurgitation (vena contracta  0.8 cm)  2. Calcified trileafletaortic valve with normal opening.  Peak transaortic valve gradient equals 3 mm Hg, mean  transaortic valve gradient equals 1 mm Hg, aortic valve  velocity time integral equals 13 cm. Moderate aortic  regurgitation  3. Moderately dilated left atrium.LA volume index = 45  cc/m2.  4. Severe global left ventricular systolic dysfunction.  5. Increased E/e'  is consistent with elevated left  ventricular filling pressure.  6. Moderate right atrial enlargement. Inferior vena cava is  dilated (>=2.5cm) with normal respiratory variability  consistent with right atrial pressure 16-20mm Hg.  7. Right ventricular enlargement with decreased right  ventricular systolic function.  8. Normal tricuspid valve. Severe tricuspid regurgitation.  9. Estimated pulmonary artery systolic pressure equals 82  mm Hg, assuming right atrial pressure equals 8 mm Hg,  consistent with severe pulmonary pressures.  10. Color Doppler demonstrates evidence of left to right  shunt  ------------------------------------------------------------------------  Confirmed on  12/3/2018 - 16:37:07 by Margaret Khan M.D.  ------------------------------------------------------------------------    < end of copied text >    Interpretation of Telemetry: sinus 90 - 120 bpm

## 2019-03-16 NOTE — PROGRESS NOTE ADULT - SUBJECTIVE AND OBJECTIVE BOX
---___---___---___---___---___---___ ---___---___---___---___---___---___---___---___---___---                    <<<  M E D I C A L   A T T E N D I N G    F O L L O W    U P   N O T E  >>>    unchanged    ---___---___---___---___---___---      <<<  MEDICATIONS:  >>>    MEDICATIONS  (STANDING):  ALBUTerol    90 MICROgram(s) HFA Inhaler 1 Puff(s) Inhalation every 4 hours  ALBUTerol/ipratropium for Nebulization 3 milliLiter(s) Nebulizer every 6 hours  apixaban 5 milliGRAM(s) Oral every 12 hours  artificial  tears Solution 1 Drop(s) Both EYES every 6 hours  Biotene Dry Mouth Oral Rinse 5 milliLiter(s) Swish and Spit two times a day  clobetasol 0.05% Ointment 1 Application(s) Topical two times a day  clonazePAM Tablet 1.5 milliGRAM(s) Oral at bedtime  Dakins Solution - 1/4 Strength 1 Application(s) Topical two times a day  dextrose 5%. 1000 milliLiter(s) (50 mL/Hr) IV Continuous <Continuous>  dextrose 50% Injectable 12.5 Gram(s) IV Push once  dextrose 50% Injectable 25 Gram(s) IV Push once  dextrose 50% Injectable 25 Gram(s) IV Push once  famotidine    Tablet 20 milliGRAM(s) Oral daily  ferrous    sulfate Liquid 300 milliGRAM(s) Oral daily  gabapentin   Solution 300 milliGRAM(s) Oral two times a day  insulin lispro (HumaLOG) corrective regimen sliding scale   SubCutaneous every 6 hours  lisinopril 2.5 milliGRAM(s) Oral daily  Medical Marijuana Oil 1 milliLiter(s),Medical Marijuana Oil 1 milliLiter(s),Medical Marijuana Oil 1 milliLiter(s),Medical Marijuana Oil 1 milliLiter(s) 1 milliLiter(s) Oral <User Schedule>  minocycline 100 milliGRAM(s) Oral two times a day  predniSONE   Tablet 7.5 milliGRAM(s) Oral daily  QUEtiapine 25 milliGRAM(s) Oral at bedtime  tiotropium 18 MICROgram(s) Capsule 1 Capsule(s) Inhalation daily      MEDICATIONS  (PRN):  acetaminophen    Suspension .. 650 milliGRAM(s) Oral every 6 hours PRN Temp greater or equal to 38C (100.4F)  dextrose 40% Gel 15 Gram(s) Oral once PRN Blood Glucose LESS THAN 70 milliGRAM(s)/deciliter  glucagon  Injectable 1 milliGRAM(s) IntraMuscular once PRN Glucose LESS THAN 70 milligrams/deciliter  sodium chloride 0.65% Nasal 1 Spray(s) Both Nostrils two times a day PRN Nasal Congestion       ---___---___---___---___---___---     <<<REVIEW OF SYSTEM: >>>    unable to obtain      ---___---___---___---___---___---          <<<  VITAL SIGNS: >>>    T(F): 97.8 (19 @ 11:29), Max: 100.6 (03-15-19 @ 18:13)  HR: 109 (19 @ 11:29) (108 - 109)  BP: 106/67 (19 @ 11:29) (106/67 - 118/73)  RR: 17 (19 @ 11:29) (17 - 18)  SpO2: 95% (19 @ 11:29) (95% - 96%)  Wt(kg): --  CAPILLARY BLOOD GLUCOSE      POCT Blood Glucose.: 190 mg/dL (16 Mar 2019 12:04)    I&O's Summary    15 Mar 2019 07:  -  16 Mar 2019 07:00  --------------------------------------------------------  IN: 1140 mL / OUT: 1250 mL / NET: -110 mL    16 Mar 2019 07:01  -  16 Mar 2019 17:41  --------------------------------------------------------  IN: 740 mL / OUT: 600 mL / NET: 140 mL         ---___---___---___---___---___---                       PHYSICAL EXAM:    GEN: A&O X0 , NAD , comfortable  HEENT: NCAT, PERRL, MMM, no scleral icterus, hearing intact  NECK: Supple, No JVD  CVS: S1S2 , regular , No M/R/G appreciated  PULM: CTA B/L,  no W/R/R appreciated  ABD.: soft. non tender, non distended,  bowel sounds present peg noted   Extrem: intact pulses , no edema noted  Derm: No rash or ecchymosis noted  PSYCH: normal mood, no depression, not anxious     ---___---___---___---___---___---     <<<  LAB AND IMAGING: >>>                          7.9    11.2  )-----------( 422      ( 15 Mar 2019 06:00 )             23.2                            Urinalysis Basic - ( 15 Mar 2019 06:32 )    Color: Light Yellow / Appearance: Clear / S.014 / pH: x  Gluc: x / Ketone: Negative  / Bili: Negative / Urobili: Negative   Blood: x / Protein: Negative / Nitrite: Negative   Leuk Esterase: Negative / RBC: x / WBC x   Sq Epi: x / Non Sq Epi: x / Bacteria: x                        [All pertinent / recent available Imaging reports and other labs reviewed]     ---___---___---___---___---___---___ ---___---___---___---___---           <<<  A S S E S S M E N T   A N D   P L A N :  >>>    sepsis  if temp 100.4 or less dc home   asthma  contiue meds  agitation continue meds   dementia supportive care  chf stable cardiology note appreciated   h/o dvt  continue andrew  chroninc pain h/o abx spaced right leg . on medical marijuana       -GI/DVT Prophylaxis.    --------------------------------------------  Case discussed with   Education given on     >>______________________<<      Deniz Bolden .         phone   [phone]

## 2019-03-16 NOTE — PROGRESS NOTE ADULT - ASSESSMENT
68 year-old woman with advanced dementia and functional quadriplegia presents with fevers of unknown origin.    Maintaining normal sinus rhythm on telemetry.  Continue apixaban 5 mg BID for patient with age < 80 and creatinine < 1.5 mg/dL in patient with history of DVTs.  Continue ACEi as tolerated.    Antibiotics per ID.    Please reconsult with any active cardiovascular issues, although I will continue to see patient at her , Caleb's, request.

## 2019-03-17 LAB
ALBUMIN SERPL ELPH-MCNC: 2.9 G/DL — LOW (ref 3.3–5)
ALP SERPL-CCNC: 98 U/L — SIGNIFICANT CHANGE UP (ref 40–120)
ALT FLD-CCNC: 13 U/L — SIGNIFICANT CHANGE UP (ref 10–45)
ANION GAP SERPL CALC-SCNC: 15 MMOL/L — SIGNIFICANT CHANGE UP (ref 5–17)
AST SERPL-CCNC: 13 U/L — SIGNIFICANT CHANGE UP (ref 10–40)
BILIRUB SERPL-MCNC: 0.2 MG/DL — SIGNIFICANT CHANGE UP (ref 0.2–1.2)
BUN SERPL-MCNC: 26 MG/DL — HIGH (ref 7–23)
CALCIUM SERPL-MCNC: 8.7 MG/DL — SIGNIFICANT CHANGE UP (ref 8.4–10.5)
CHLORIDE SERPL-SCNC: 87 MMOL/L — LOW (ref 96–108)
CO2 SERPL-SCNC: 23 MMOL/L — SIGNIFICANT CHANGE UP (ref 22–31)
CREAT SERPL-MCNC: 0.31 MG/DL — LOW (ref 0.5–1.3)
GLUCOSE BLDC GLUCOMTR-MCNC: 108 MG/DL — HIGH (ref 70–99)
GLUCOSE BLDC GLUCOMTR-MCNC: 112 MG/DL — HIGH (ref 70–99)
GLUCOSE BLDC GLUCOMTR-MCNC: 112 MG/DL — HIGH (ref 70–99)
GLUCOSE BLDC GLUCOMTR-MCNC: 113 MG/DL — HIGH (ref 70–99)
GLUCOSE BLDC GLUCOMTR-MCNC: 136 MG/DL — HIGH (ref 70–99)
GLUCOSE BLDC GLUCOMTR-MCNC: 78 MG/DL — SIGNIFICANT CHANGE UP (ref 70–99)
GLUCOSE BLDC GLUCOMTR-MCNC: 78 MG/DL — SIGNIFICANT CHANGE UP (ref 70–99)
GLUCOSE SERPL-MCNC: 131 MG/DL — HIGH (ref 70–99)
HCT VFR BLD CALC: 27.5 % — LOW (ref 34.5–45)
HGB BLD-MCNC: 9.4 G/DL — LOW (ref 11.5–15.5)
MCHC RBC-ENTMCNC: 31 PG — SIGNIFICANT CHANGE UP (ref 27–34)
MCHC RBC-ENTMCNC: 34.1 GM/DL — SIGNIFICANT CHANGE UP (ref 32–36)
MCV RBC AUTO: 90.7 FL — SIGNIFICANT CHANGE UP (ref 80–100)
PLATELET # BLD AUTO: 617 K/UL — HIGH (ref 150–400)
POTASSIUM SERPL-MCNC: 4.7 MMOL/L — SIGNIFICANT CHANGE UP (ref 3.5–5.3)
POTASSIUM SERPL-SCNC: 4.7 MMOL/L — SIGNIFICANT CHANGE UP (ref 3.5–5.3)
PROT SERPL-MCNC: 5.6 G/DL — LOW (ref 6–8.3)
RBC # BLD: 3.03 M/UL — LOW (ref 3.8–5.2)
RBC # FLD: 15.7 % — HIGH (ref 10.3–14.5)
SODIUM SERPL-SCNC: 125 MMOL/L — LOW (ref 135–145)
WBC # BLD: 16.6 K/UL — HIGH (ref 3.8–10.5)
WBC # FLD AUTO: 16.6 K/UL — HIGH (ref 3.8–10.5)

## 2019-03-17 RX ADMIN — Medication 5 MILLILITER(S): at 16:41

## 2019-03-17 RX ADMIN — MINOCYCLINE HYDROCHLORIDE 100 MILLIGRAM(S): 45 TABLET, EXTENDED RELEASE ORAL at 16:40

## 2019-03-17 RX ADMIN — FAMOTIDINE 20 MILLIGRAM(S): 10 INJECTION INTRAVENOUS at 12:35

## 2019-03-17 RX ADMIN — Medication 1 APPLICATION(S): at 06:05

## 2019-03-17 RX ADMIN — Medication 1 DROP(S): at 06:05

## 2019-03-17 RX ADMIN — Medication 1 APPLICATION(S): at 16:42

## 2019-03-17 RX ADMIN — MINOCYCLINE HYDROCHLORIDE 100 MILLIGRAM(S): 45 TABLET, EXTENDED RELEASE ORAL at 06:04

## 2019-03-17 RX ADMIN — Medication 5 MILLILITER(S): at 06:05

## 2019-03-17 RX ADMIN — Medication 7.5 MILLIGRAM(S): at 06:04

## 2019-03-17 RX ADMIN — APIXABAN 5 MILLIGRAM(S): 2.5 TABLET, FILM COATED ORAL at 21:11

## 2019-03-17 RX ADMIN — Medication 1 APPLICATION(S): at 16:41

## 2019-03-17 RX ADMIN — Medication 3 MILLILITER(S): at 06:05

## 2019-03-17 RX ADMIN — Medication 1.5 MILLIGRAM(S): at 21:12

## 2019-03-17 RX ADMIN — LISINOPRIL 2.5 MILLIGRAM(S): 2.5 TABLET ORAL at 06:04

## 2019-03-17 RX ADMIN — Medication 300 MILLIGRAM(S): at 12:35

## 2019-03-17 RX ADMIN — GABAPENTIN 300 MILLIGRAM(S): 400 CAPSULE ORAL at 10:34

## 2019-03-17 RX ADMIN — Medication 650 MILLIGRAM(S): at 17:59

## 2019-03-17 RX ADMIN — Medication 650 MILLIGRAM(S): at 00:36

## 2019-03-17 RX ADMIN — APIXABAN 5 MILLIGRAM(S): 2.5 TABLET, FILM COATED ORAL at 10:34

## 2019-03-17 RX ADMIN — QUETIAPINE FUMARATE 25 MILLIGRAM(S): 200 TABLET, FILM COATED ORAL at 21:11

## 2019-03-17 RX ADMIN — Medication 1 DROP(S): at 12:35

## 2019-03-17 RX ADMIN — Medication 1 DROP(S): at 16:41

## 2019-03-17 RX ADMIN — GABAPENTIN 300 MILLIGRAM(S): 400 CAPSULE ORAL at 22:32

## 2019-03-17 NOTE — CHART NOTE - NSCHARTNOTEFT_GEN_A_CORE
White count has downtrended to baseline. Pt still with occasional fever. No further hematology workup planned. Please reach out if any further questions.    Ranjit Barker, PGY-4  Hematology-Oncology Fellow  [phone] ([place]) [phone] ([facility])

## 2019-03-17 NOTE — PROGRESS NOTE ADULT - SUBJECTIVE AND OBJECTIVE BOX
---___---___---___---___---___---___ ---___---___---___---___---___---___---___---___---___---                    <<<  M E D I C A L   A T T E N D I N G    F O L L O W    U P   N O T E  >>>    fever 101.3 at night otherwise unchanged.     ---___---___---___---___---___---      <<<  MEDICATIONS:  >>>    MEDICATIONS  (STANDING):  ALBUTerol    90 MICROgram(s) HFA Inhaler 1 Puff(s) Inhalation every 4 hours  ALBUTerol/ipratropium for Nebulization 3 milliLiter(s) Nebulizer every 6 hours  apixaban 5 milliGRAM(s) Oral every 12 hours  artificial  tears Solution 1 Drop(s) Both EYES every 6 hours  Biotene Dry Mouth Oral Rinse 5 milliLiter(s) Swish and Spit two times a day  clobetasol 0.05% Ointment 1 Application(s) Topical two times a day  clonazePAM Tablet 1.5 milliGRAM(s) Oral at bedtime  Dakins Solution - 1/4 Strength 1 Application(s) Topical two times a day  dextrose 5%. 1000 milliLiter(s) (50 mL/Hr) IV Continuous <Continuous>  dextrose 50% Injectable 12.5 Gram(s) IV Push once  dextrose 50% Injectable 25 Gram(s) IV Push once  dextrose 50% Injectable 25 Gram(s) IV Push once  famotidine    Tablet 20 milliGRAM(s) Oral daily  ferrous    sulfate Liquid 300 milliGRAM(s) Oral daily  gabapentin   Solution 300 milliGRAM(s) Oral two times a day  insulin lispro (HumaLOG) corrective regimen sliding scale   SubCutaneous every 6 hours  lisinopril 2.5 milliGRAM(s) Oral daily  Medical Marijuana Oil 1 milliLiter(s),Medical Marijuana Oil 1 milliLiter(s),Medical Marijuana Oil 1 milliLiter(s),Medical Marijuana Oil 1 milliLiter(s) 1 milliLiter(s) Oral <User Schedule>  minocycline 100 milliGRAM(s) Oral two times a day  predniSONE   Tablet 7.5 milliGRAM(s) Oral daily  QUEtiapine 25 milliGRAM(s) Oral at bedtime  tiotropium 18 MICROgram(s) Capsule 1 Capsule(s) Inhalation daily      MEDICATIONS  (PRN):  acetaminophen    Suspension .. 650 milliGRAM(s) Oral every 6 hours PRN Temp greater or equal to 38C (100.4F)  dextrose 40% Gel 15 Gram(s) Oral once PRN Blood Glucose LESS THAN 70 milliGRAM(s)/deciliter  glucagon  Injectable 1 milliGRAM(s) IntraMuscular once PRN Glucose LESS THAN 70 milligrams/deciliter  sodium chloride 0.65% Nasal 1 Spray(s) Both Nostrils two times a day PRN Nasal Congestion       ---___---___---___---___---___---     <<<REVIEW OF SYSTEM: >>>    unable to obtain      ---___---___---___---___---___---          <<<  VITAL SIGNS: >>>    T(F): 98.2 (03-17-19 @ 10:41), Max: 101.3 (03-16-19 @ 21:00)  HR: 92 (03-17-19 @ 06:02) (92 - 111)  BP: 126/68 (03-17-19 @ 06:02) (105/63 - 129/70)  RR: 18 (03-17-19 @ 06:02) (18 - 18)  SpO2: 95% (03-17-19 @ 06:02) (95% - 96%)  Wt(kg): --  CAPILLARY BLOOD GLUCOSE      POCT Blood Glucose.: 112 mg/dL (17 Mar 2019 11:46)    I&O's Summary    16 Mar 2019 07:01  -  17 Mar 2019 07:00  --------------------------------------------------------  IN: 1341 mL / OUT: 600 mL / NET: 741 mL    17 Mar 2019 07:01  -  17 Mar 2019 12:44  --------------------------------------------------------  IN: 340 mL / OUT: 0 mL / NET: 340 mL         ---___---___---___---___---___---                       PHYSICAL EXAM:    GEN: A&O X 0 , NAD , comfortable  HEENT: NCAT, PERRL, MMM, no scleral icterus, hearing intact  NECK: Supple, No JVD  CVS: S1S2 , regular , No M/R/G appreciated  PULM: CTA B/L,  no W/R/R appreciated  ABD.: soft. non tender, non distended,  bowel sounds present peg noted   Extrem: intact pulses , no edema noted  Derm: stage 4 sacral decubitus   PSYCH: normal mood, no depression, not anxious     ---___---___---___---___---___---     <<<  LAB AND IMAGING: >>>                                                 [All pertinent / recent available Imaging reports and other labs reviewed]     ---___---___---___---___---___---___ ---___---___---___---___---           <<<  A S S E S S M E N T   A N D   P L A N :  >>>  fever  monitor labs. if labs stable  although patient may have >100.4 temp but only occurs at [facility] consider dc home with tyleol for fever. instructed  that she may   not be a canddate for another surgery for the spacer due to her comorbid conditions   agitation continue seroquel   asthma  continue budesonide   dementia  supportive care   h/o dvt  on eliquis   chf  contine lisinopril      may need to dc home in am despite elevated temp. patient is otherwise stable         -GI/DVT Prophylaxis.    --------------------------------------------  Case discussed with   Education given on     >>______________________<<      Deniz Bolden .         phone   [phone]

## 2019-03-18 ENCOUNTER — TRANSCRIPTION ENCOUNTER (OUTPATIENT)
Age: 69
End: 2019-03-18

## 2019-03-18 VITALS
TEMPERATURE: 98 F | OXYGEN SATURATION: 95 % | SYSTOLIC BLOOD PRESSURE: 135 MMHG | DIASTOLIC BLOOD PRESSURE: 81 MMHG | RESPIRATION RATE: 17 BRPM | HEART RATE: 106 BPM

## 2019-03-18 LAB
ALBUMIN SERPL ELPH-MCNC: 2.6 G/DL — LOW (ref 3.3–5)
ALP SERPL-CCNC: 98 U/L — SIGNIFICANT CHANGE UP (ref 40–120)
ALT FLD-CCNC: 14 U/L — SIGNIFICANT CHANGE UP (ref 10–45)
ANION GAP SERPL CALC-SCNC: 14 MMOL/L — SIGNIFICANT CHANGE UP (ref 5–17)
AST SERPL-CCNC: 13 U/L — SIGNIFICANT CHANGE UP (ref 10–40)
BILIRUB SERPL-MCNC: 0.3 MG/DL — SIGNIFICANT CHANGE UP (ref 0.2–1.2)
BUN SERPL-MCNC: 22 MG/DL — SIGNIFICANT CHANGE UP (ref 7–23)
CALCIUM SERPL-MCNC: 9 MG/DL — SIGNIFICANT CHANGE UP (ref 8.4–10.5)
CHLORIDE SERPL-SCNC: 91 MMOL/L — LOW (ref 96–108)
CO2 SERPL-SCNC: 22 MMOL/L — SIGNIFICANT CHANGE UP (ref 22–31)
CREAT SERPL-MCNC: 0.32 MG/DL — LOW (ref 0.5–1.3)
GLUCOSE BLDC GLUCOMTR-MCNC: 133 MG/DL — HIGH (ref 70–99)
GLUCOSE BLDC GLUCOMTR-MCNC: 216 MG/DL — HIGH (ref 70–99)
GLUCOSE SERPL-MCNC: 127 MG/DL — HIGH (ref 70–99)
HCT VFR BLD CALC: 26.6 % — LOW (ref 34.5–45)
HGB BLD-MCNC: 8.4 G/DL — LOW (ref 11.5–15.5)
MCHC RBC-ENTMCNC: 29.6 PG — SIGNIFICANT CHANGE UP (ref 27–34)
MCHC RBC-ENTMCNC: 31.6 GM/DL — LOW (ref 32–36)
MCV RBC AUTO: 93.7 FL — SIGNIFICANT CHANGE UP (ref 80–100)
PLATELET # BLD AUTO: 517 K/UL — HIGH (ref 150–400)
POTASSIUM SERPL-MCNC: 4.2 MMOL/L — SIGNIFICANT CHANGE UP (ref 3.5–5.3)
POTASSIUM SERPL-SCNC: 4.2 MMOL/L — SIGNIFICANT CHANGE UP (ref 3.5–5.3)
PROT SERPL-MCNC: 5.6 G/DL — LOW (ref 6–8.3)
RBC # BLD: 2.84 M/UL — LOW (ref 3.8–5.2)
RBC # FLD: 16.6 % — HIGH (ref 10.3–14.5)
SODIUM SERPL-SCNC: 127 MMOL/L — LOW (ref 135–145)
WBC # BLD: 11.99 K/UL — HIGH (ref 3.8–10.5)
WBC # FLD AUTO: 11.99 K/UL — HIGH (ref 3.8–10.5)

## 2019-03-18 PROCEDURE — 80048 BASIC METABOLIC PNL TOTAL CA: CPT

## 2019-03-18 PROCEDURE — 87186 SC STD MICRODIL/AGAR DIL: CPT

## 2019-03-18 PROCEDURE — 84100 ASSAY OF PHOSPHORUS: CPT

## 2019-03-18 PROCEDURE — 82962 GLUCOSE BLOOD TEST: CPT

## 2019-03-18 PROCEDURE — 97162 PT EVAL MOD COMPLEX 30 MIN: CPT

## 2019-03-18 PROCEDURE — 99285 EMERGENCY DEPT VISIT HI MDM: CPT

## 2019-03-18 PROCEDURE — 94640 AIRWAY INHALATION TREATMENT: CPT

## 2019-03-18 PROCEDURE — 82272 OCCULT BLD FECES 1-3 TESTS: CPT

## 2019-03-18 PROCEDURE — 93005 ELECTROCARDIOGRAM TRACING: CPT

## 2019-03-18 PROCEDURE — 82550 ASSAY OF CK (CPK): CPT

## 2019-03-18 PROCEDURE — 78802 RP LOCLZJ TUM WHBDY 1 D IMG: CPT

## 2019-03-18 PROCEDURE — 83605 ASSAY OF LACTIC ACID: CPT

## 2019-03-18 PROCEDURE — 86850 RBC ANTIBODY SCREEN: CPT

## 2019-03-18 PROCEDURE — 78102 BONE MARROW IMAGING LTD: CPT

## 2019-03-18 PROCEDURE — 70450 CT HEAD/BRAIN W/O DYE: CPT

## 2019-03-18 PROCEDURE — 82728 ASSAY OF FERRITIN: CPT

## 2019-03-18 PROCEDURE — 85610 PROTHROMBIN TIME: CPT

## 2019-03-18 PROCEDURE — 87486 CHLMYD PNEUM DNA AMP PROBE: CPT

## 2019-03-18 PROCEDURE — 84132 ASSAY OF SERUM POTASSIUM: CPT

## 2019-03-18 PROCEDURE — 86901 BLOOD TYPING SEROLOGIC RH(D): CPT

## 2019-03-18 PROCEDURE — 87086 URINE CULTURE/COLONY COUNT: CPT

## 2019-03-18 PROCEDURE — 82330 ASSAY OF CALCIUM: CPT

## 2019-03-18 PROCEDURE — 87449 NOS EACH ORGANISM AG IA: CPT

## 2019-03-18 PROCEDURE — 36600 WITHDRAWAL OF ARTERIAL BLOOD: CPT

## 2019-03-18 PROCEDURE — 82435 ASSAY OF BLOOD CHLORIDE: CPT

## 2019-03-18 PROCEDURE — 87581 M.PNEUMON DNA AMP PROBE: CPT

## 2019-03-18 PROCEDURE — 80053 COMPREHEN METABOLIC PANEL: CPT

## 2019-03-18 PROCEDURE — A9570: CPT

## 2019-03-18 PROCEDURE — 82947 ASSAY GLUCOSE BLOOD QUANT: CPT

## 2019-03-18 PROCEDURE — 87633 RESP VIRUS 12-25 TARGETS: CPT

## 2019-03-18 PROCEDURE — 83735 ASSAY OF MAGNESIUM: CPT

## 2019-03-18 PROCEDURE — 85027 COMPLETE CBC AUTOMATED: CPT

## 2019-03-18 PROCEDURE — 93306 TTE W/DOPPLER COMPLETE: CPT

## 2019-03-18 PROCEDURE — 87507 IADNA-DNA/RNA PROBE TQ 12-25: CPT

## 2019-03-18 PROCEDURE — 85730 THROMBOPLASTIN TIME PARTIAL: CPT

## 2019-03-18 PROCEDURE — 74176 CT ABD & PELVIS W/O CONTRAST: CPT

## 2019-03-18 PROCEDURE — 87798 DETECT AGENT NOS DNA AMP: CPT

## 2019-03-18 PROCEDURE — 97110 THERAPEUTIC EXERCISES: CPT

## 2019-03-18 PROCEDURE — 81003 URINALYSIS AUTO W/O SCOPE: CPT

## 2019-03-18 PROCEDURE — 86900 BLOOD TYPING SEROLOGIC ABO: CPT

## 2019-03-18 PROCEDURE — 85652 RBC SED RATE AUTOMATED: CPT

## 2019-03-18 PROCEDURE — 83880 ASSAY OF NATRIURETIC PEPTIDE: CPT

## 2019-03-18 PROCEDURE — 73522 X-RAY EXAM HIPS BI 3-4 VIEWS: CPT

## 2019-03-18 PROCEDURE — 84145 PROCALCITONIN (PCT): CPT

## 2019-03-18 PROCEDURE — 87324 CLOSTRIDIUM AG IA: CPT

## 2019-03-18 PROCEDURE — 84550 ASSAY OF BLOOD/URIC ACID: CPT

## 2019-03-18 PROCEDURE — 87046 STOOL CULTR AEROBIC BACT EA: CPT

## 2019-03-18 PROCEDURE — 86140 C-REACTIVE PROTEIN: CPT

## 2019-03-18 PROCEDURE — 85014 HEMATOCRIT: CPT

## 2019-03-18 PROCEDURE — 71250 CT THORAX DX C-: CPT

## 2019-03-18 PROCEDURE — 87040 BLOOD CULTURE FOR BACTERIA: CPT

## 2019-03-18 PROCEDURE — 83036 HEMOGLOBIN GLYCOSYLATED A1C: CPT

## 2019-03-18 PROCEDURE — 81001 URINALYSIS AUTO W/SCOPE: CPT

## 2019-03-18 PROCEDURE — 87045 FECES CULTURE AEROBIC BACT: CPT

## 2019-03-18 PROCEDURE — 71045 X-RAY EXAM CHEST 1 VIEW: CPT

## 2019-03-18 PROCEDURE — A9541: CPT

## 2019-03-18 PROCEDURE — 87150 DNA/RNA AMPLIFIED PROBE: CPT

## 2019-03-18 PROCEDURE — 82803 BLOOD GASES ANY COMBINATION: CPT

## 2019-03-18 PROCEDURE — 84295 ASSAY OF SERUM SODIUM: CPT

## 2019-03-18 PROCEDURE — 87177 OVA AND PARASITES SMEARS: CPT

## 2019-03-18 RX ORDER — LISINOPRIL 2.5 MG/1
1 TABLET ORAL
Qty: 30 | Refills: 0 | OUTPATIENT
Start: 2019-03-18 | End: 2019-04-16

## 2019-03-18 RX ORDER — MINOCYCLINE HYDROCHLORIDE 45 MG/1
1 TABLET, EXTENDED RELEASE ORAL
Qty: 60 | Refills: 0 | OUTPATIENT
Start: 2019-03-18 | End: 2019-04-16

## 2019-03-18 RX ORDER — SERTRALINE 25 MG/1
1 TABLET, FILM COATED ORAL
Qty: 0 | Refills: 0 | COMMUNITY

## 2019-03-18 RX ORDER — TIZANIDINE 4 MG/1
0 TABLET ORAL
Qty: 0 | Refills: 0 | COMMUNITY

## 2019-03-18 RX ORDER — SODIUM HYPOCHLORITE 0.125 %
1 SOLUTION, NON-ORAL MISCELLANEOUS
Qty: 1 | Refills: 0
Start: 2019-03-18 | End: 2019-04-16

## 2019-03-18 RX ADMIN — Medication 2: at 12:30

## 2019-03-18 RX ADMIN — Medication 650 MILLIGRAM(S): at 06:36

## 2019-03-18 RX ADMIN — Medication 1 DROP(S): at 11:34

## 2019-03-18 RX ADMIN — Medication 1 APPLICATION(S): at 06:08

## 2019-03-18 RX ADMIN — GABAPENTIN 300 MILLIGRAM(S): 400 CAPSULE ORAL at 11:34

## 2019-03-18 RX ADMIN — Medication 650 MILLIGRAM(S): at 09:30

## 2019-03-18 RX ADMIN — Medication 1 DROP(S): at 06:06

## 2019-03-18 RX ADMIN — FAMOTIDINE 20 MILLIGRAM(S): 10 INJECTION INTRAVENOUS at 11:34

## 2019-03-18 RX ADMIN — MINOCYCLINE HYDROCHLORIDE 100 MILLIGRAM(S): 45 TABLET, EXTENDED RELEASE ORAL at 06:07

## 2019-03-18 RX ADMIN — Medication 3 MILLILITER(S): at 11:34

## 2019-03-18 RX ADMIN — APIXABAN 5 MILLIGRAM(S): 2.5 TABLET, FILM COATED ORAL at 09:47

## 2019-03-18 RX ADMIN — LISINOPRIL 2.5 MILLIGRAM(S): 2.5 TABLET ORAL at 06:07

## 2019-03-18 RX ADMIN — Medication 7.5 MILLIGRAM(S): at 06:08

## 2019-03-18 RX ADMIN — Medication 3 MILLILITER(S): at 06:06

## 2019-03-18 RX ADMIN — Medication 5 MILLILITER(S): at 06:07

## 2019-03-18 RX ADMIN — Medication 300 MILLIGRAM(S): at 11:34

## 2019-03-18 NOTE — PROGRESS NOTE ADULT - PROVIDER SPECIALTY LIST ADULT
Cardiology
Family Medicine
Infectious Disease
Internal Medicine
MICU
Podiatry
Pulmonology
Pulmonology
Wound Care
Family Medicine
Family Medicine
Internal Medicine
Pulmonology
Cardiology
Cardiology
Family Medicine

## 2019-03-18 NOTE — PROVIDER CONTACT NOTE (OTHER) - RECOMMENDATIONS
No Tylenol now as Tylenol was given@6pm ,to give tylenol@2300 if pt has fever
Tylenol 650mg elixir given.
notify NP
Albuterol ?
Follow hypoglycemia protocol.
Follow hypoglycemia protocol.
Hypoglycemic protocol followed
come assess pt and EKG
continue to monitor
notify PA
pt should have central line placed. pt needs to be assessed at bedside.
start yue

## 2019-03-18 NOTE — DISCHARGE NOTE PROVIDER - CARE PROVIDER_API CALL
Deniz Bolden (DO)  Family Medicine  [address] [place] [id number]  Phone: [phone]  Fax: [phone]  Follow Up Time: 1 week    Yulissa Key)  Surgery; Vascular Surgery  [address] [facility], [address]  [place] [address]  Phone: [phone]  Fax: [phone]  Follow Up Time: 2 weeks

## 2019-03-18 NOTE — DISCHARGE NOTE PROVIDER - NSDCHHNEEDSERVICE_GEN_ALL_CORE
Rehabilitation services/Wound care and assessment/Medication teaching and assessment/Teaching and training

## 2019-03-18 NOTE — DISCHARGE NOTE PROVIDER - HOSPITAL COURSE
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 Bolden as patient had fever today at 19:00 brought in by EMS with family at bedside . History provided by  as patient is nonverbal. Per , patient recently prescribed medrol dose joselin last dose today for asthma exacerbation. Had two episodes of liquidy diarrhea and has not recently been on course of antibiotics. Has sacral wound that is being taken care of by wound clinic. admitted on 2/26 with fever  103.  pt received cefepime and zyvox in ER. Course requiring MICU stay for fevers, completed course of Meropenem per ID. 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 Recommendations are to change the spacer and get cultures, but per , he does not want to put her through surgery or any invasive diagnostic workup. Today, pt is afebrile so far and WBC is improved.

## 2019-03-18 NOTE — PROVIDER CONTACT NOTE (OTHER) - ACTION/TREATMENT ORDERED:
Give tylenol recheck in 1 hr
NP Abril Easton notified RRT called. See RRT sheet
No interventions continue to monitor
Ice pack applied will continue to monitor temperature
Ignore blood cultures pending, but obtain blood gas.
NP made aware.
NP made aware. Dextrose 50% iv push given according to hypoglycemia protocol. blood sugar tested q 15. last bg was 94. day nurse was informed and will administer 2nd dextrose 50% until bg >/= 100.
PA notified and aware. Will order Viokase to unclog peg tube.
Pt received Tylenol at 1840 not due to next dose Pt placed on cooling blanket
Tylenol administered. Cooling measures taken. Bld cx's and UA sent. CXR performed.
BRET Medina notified.  awaiting orders.
EKG perform
Hypoglycemic protocol followed
NP aware. Will look into pt. chart. and order albuterol. Will continue to monitor.
Provider made aware of protocol. Provider stated to give full 50ml of D50. See provider to RN note.
Provider made aware. Pt was given 50ml D50 as per hypoglycemia protocol. FS to be repeated q15 until over 100.
came to assess patient will talk to Dr. Kelly to place a central line.     central line was then placed @0869
continue to monitor
start yue and d/c levo

## 2019-03-18 NOTE — PROGRESS NOTE ADULT - REASON FOR ADMISSION
Fevers for the past 2 days at home
Fevers
Fevers for the past 2 days at home

## 2019-03-18 NOTE — DISCHARGE NOTE PROVIDER - CARE PROVIDERS DIRECT ADDRESSES
,DirectAddress_Unknown,[email or web address]

## 2019-03-18 NOTE — DISCHARGE NOTE PROVIDER - PROVIDER TOKENS
PROVIDER:[TOKEN:[[id number]],FOLLOWUP:[1 week]],PROVIDER:[TOKEN:[[id number]],FOLLOWUP:[2 weeks]]

## 2019-03-18 NOTE — DISCHARGE NOTE PROVIDER - NSDCCPCAREPLAN_GEN_ALL_CORE_FT
PRINCIPAL DISCHARGE DIAGNOSIS  Diagnosis: Fever  Assessment and Plan of Treatment: You have chronic fevers likely secondary to chronic MRSA infection from surgery. Continue suppressive Minocycline therapy. Follow up with your primary care physician within 1 week of discharge.      SECONDARY DISCHARGE DIAGNOSES  Diagnosis: Sepsis  Assessment and Plan of Treatment: Call you Health care provider upon arrival home to make a one week follow up appointment.  If you develop fever, chills, malaise, or change in mental status call your Health Care Provider or go to the Emergency Department.  Nutrition is important, eat small frequent meals to help ensure you get adequate calories.  Do not stay in bed all day!  Increase your activity daily as tolerated.      Diagnosis: Dementia without behavioral disturbance, unspecified dementia type  Assessment and Plan of Treatment: Dementia causes memory problems and make it hard to think clear. The symptoms of dementia often start off very mild and get worse slowly. Symptoms are forgetfulness, confusion, trouble with language, getting lost in familiar places. As dementia gets worse, it can cause anger or aggression, see things that aren't there, decreased ability to eat, bathe, dress, or do other everyday tasks, or cause people to lose bladder and bowel control. Alzheimer disease, there are medicines that might help some. If you have vascular dementia, your doctor will focus on keeping your blood pressure and cholesterol as normal as possible. Sadly, there really aren't good treatments for most types of dementia.  Prevent falls and accidents by securing loose rugs or use non-skid rugs, loose wires or electrical cords. Wear sturdy, comfortable shoes, keep walkways well lit. There are no proven ways to prevent dementia. But encourage physical activity, healthy diet and social interaction to help keep the brain healthy. Keep medications, sharp knives, lighters, matches, power tools, and guns out of reach. Lower the temperature on water heaters. Keep familiar objects and people around. Use reminders, notes, or directions for daily activities or tasks. Keep a simple, consistent routine for waking, meals, bathing, dressing, and bedtime. Display emergency numbers and home address near all telephones.   SEEK MEDICAL CARE IF: New behavioral problems start such as moodiness, aggressiveness, or seeing things that are not there (hallucinations). Any new problem with brain function happens. This includes problems with balance, speech, swallowing difficulty or falling a lot. Small changes or worsening in any aspect of brain function can be a sign that the illness is getting worse or a sign of infection. Seeing a caregiver right away is important.   SEEK IMMEDIATE MEDICAL CARE IF: New or worsened confusion, sleepiness, or inability to sleep develops.      Diagnosis: Asthma, unspecified asthma severity, unspecified whether complicated, unspecified whether persistent  Assessment and Plan of Treatment: Asthma attacks happen when the airways in the lungs become narrow and inflamed  Asthma symptoms can include - Wheezing or noisy breathing, coughing, tight feeling in the chest, shortness of breath  Almost everyone with asthma has a quick-relief inhaler that works in 5- 10mins that they carry with them and long-term controller medicines control asthma and prevent future symptoms. People with frequent asthma symptoms take these 1 or 2 times each day.  You can stay away from things that cause your symptoms or make them worse. Doctors call these "triggers."  avoid them as much as possible. Some common triggers include - Dust, mold, animals, such as dogs and cats, pollen and plants, cigarette smoke, getting sick with a cold or flu (that's why it's important to get a flu shot), stress  Talk to your caregiver about an action plan for managing asthma attacks. This includes the use of a peak flow meter which measures the severity of the attack and medicines that can help stop the attack.   SEEK MEDICAL CARE IF:  You have wheezing, shortness of breath, or a cough even if taking medicine to prevent attacks.   You have thickening of sputum, sputum changes from clear or white to yellow, green, gray, or bloody.   You have any problems that may be related to the medicines you are taking (such as a rash, itching, swelling, or trouble breathing).  You are using a reliever medicine more than 2–3 times per week.  Your peak flow is still at 50–79% of personal best after following your action plan for 1 hour.  SEEK IMMEDIATE MEDICAL CARE IF:  You are short of breath even at rest,or with very little physical activity, chest pain, heart beating fast, bluish color to your lips or fingernails, fever, dizziness,   You seem to be getting worse and are unresponsive to treatment during an asthma attack.   Your peak flow is less than 50% of personal best.      Diagnosis: Pressure injury of sacral region, unstageable  Assessment and Plan of Treatment: Continue wound care.   You can follow up at the wound care clinic 1 week from discharge.

## 2019-03-18 NOTE — PROGRESS NOTE ADULT - SUBJECTIVE AND OBJECTIVE BOX
---___---___---___---___---___---___ ---___---___---___---___---___---___---___---___---___---                    <<<  M E D I C A L   A T T E N D I N G    F O L L O W    U P   N O T E  >>>    wbc elevated and fever last night otherwise unchanged      ---___---___---___---___---___---      <<<  MEDICATIONS:  >>>    MEDICATIONS  (STANDING):  ALBUTerol    90 MICROgram(s) HFA Inhaler 1 Puff(s) Inhalation every 4 hours  ALBUTerol/ipratropium for Nebulization 3 milliLiter(s) Nebulizer every 6 hours  apixaban 5 milliGRAM(s) Oral every 12 hours  artificial  tears Solution 1 Drop(s) Both EYES every 6 hours  Biotene Dry Mouth Oral Rinse 5 milliLiter(s) Swish and Spit two times a day  clobetasol 0.05% Ointment 1 Application(s) Topical two times a day  clonazePAM Tablet 1.5 milliGRAM(s) Oral at bedtime  Dakins Solution - 1/4 Strength 1 Application(s) Topical two times a day  dextrose 5%. 1000 milliLiter(s) (50 mL/Hr) IV Continuous <Continuous>  dextrose 50% Injectable 12.5 Gram(s) IV Push once  dextrose 50% Injectable 25 Gram(s) IV Push once  dextrose 50% Injectable 25 Gram(s) IV Push once  famotidine    Tablet 20 milliGRAM(s) Oral daily  ferrous    sulfate Liquid 300 milliGRAM(s) Oral daily  gabapentin   Solution 300 milliGRAM(s) Oral two times a day  insulin lispro (HumaLOG) corrective regimen sliding scale   SubCutaneous every 6 hours  lisinopril 2.5 milliGRAM(s) Oral daily  Medical Marijuana Oil 1 milliLiter(s),Medical Marijuana Oil 1 milliLiter(s),Medical Marijuana Oil 1 milliLiter(s),Medical Marijuana Oil 1 milliLiter(s) 1 milliLiter(s) Oral <User Schedule>  minocycline 100 milliGRAM(s) Oral two times a day  predniSONE   Tablet 7.5 milliGRAM(s) Oral daily  QUEtiapine 25 milliGRAM(s) Oral at bedtime  tiotropium 18 MICROgram(s) Capsule 1 Capsule(s) Inhalation daily      MEDICATIONS  (PRN):  acetaminophen    Suspension .. 650 milliGRAM(s) Oral every 6 hours PRN Temp greater or equal to 38C (100.4F)  dextrose 40% Gel 15 Gram(s) Oral once PRN Blood Glucose LESS THAN 70 milliGRAM(s)/deciliter  glucagon  Injectable 1 milliGRAM(s) IntraMuscular once PRN Glucose LESS THAN 70 milligrams/deciliter  sodium chloride 0.65% Nasal 1 Spray(s) Both Nostrils two times a day PRN Nasal Congestion       ---___---___---___---___---___---     <<<REVIEW OF SYSTEM: >>>    unable to obtain      ---___---___---___---___---___---          <<<  VITAL SIGNS: >>>    T(F): 100.6 (03-18-19 @ 06:45), Max: 101 (03-17-19 @ 20:39)  HR: 112 (03-18-19 @ 06:05) (108 - 112)  BP: 115/77 (03-18-19 @ 06:05) (115/77 - 132/84)  RR: 94 (03-18-19 @ 06:45) (18 - 94)  SpO2: 94% (03-17-19 @ 23:56) (94% - 95%)  Wt(kg): --  CAPILLARY BLOOD GLUCOSE      POCT Blood Glucose.: 133 mg/dL (18 Mar 2019 06:16)    I&O's Summary    17 Mar 2019 07:01  -  18 Mar 2019 07:00  --------------------------------------------------------  IN: 2390 mL / OUT: 2100 mL / NET: 290 mL         ---___---___---___---___---___---                       PHYSICAL EXAM:    GEN: A&O X 0 , NAD , comfortable  HEENT: NCAT, PERRL, MMM, no scleral icterus, hearing intact  NECK: Supple, No JVD  CVS: S1S2 , regular , No M/R/G appreciated  PULM: CTA B/L,  no W/R/R appreciated  ABD.: soft. non tender, non distended,  bowel sounds present peg noted   Extrem: intact pulses , no edema noted  Derm: No rash or ecchymosis noted      ---___---___---___---___---___---     <<<  LAB AND IMAGING: >>>                          9.4    16.6  )-----------( 617      ( 17 Mar 2019 15:59 )             27.5               03-17    125<L>  |  87<L>  |  26<H>  ----------------------------<  131<H>  4.7   |  23  |  0.31<L>    Ca    8.7      17 Mar 2019 15:59    TPro  5.6<L>  /  Alb  2.9<L>  /  TBili  0.2  /  DBili  x   /  AST  13  /  ALT  13  /  AlkPhos  98  03-17                               [All pertinent / recent available Imaging reports and other labs reviewed]     ---___---___---___---___---___---___ ---___---___---___---___---           <<<  A S S E S S M E N T   A N D   P L A N :  >>>    leukocytosis  if pulmonary agres can we change the budesonide or if possibel dc to see if leukocytosis resolves  fever continue tomonitor   asthma  on budesonide  h/o dvt  on abixaban   chronic pain on medical marijua h/o lumbar fractures  and right hip spacers  agitation on seroquel       -GI/DVT Prophylaxis.    --------------------------------------------  Case discussed with   Education given on     >>______________________<<      Deniz oBlden .         phone   [phone]

## 2019-03-18 NOTE — DISCHARGE NOTE PROVIDER - NSDCCAREPROVSEEN_GEN_ALL_CORE_FT
Oscar, Rustam Pacheco, Shubham Powell, Rubén Medina, Sonny Bolden, Deniz  [facility] Medicine, Advance PracticeTeam  [facility] Nutrition Support Team, Team

## 2019-03-18 NOTE — DISCHARGE NOTE PROVIDER - NSFOLLOWUPCLINICS_GEN_ALL_ED_FT
[facility]  Wound Care  [address] [place] [address]  Phone: [phone]  Fax: [phone]  Follow Up Time: 7-10 Days

## 2019-03-20 LAB
CULTURE RESULTS: SIGNIFICANT CHANGE UP
CULTURE RESULTS: SIGNIFICANT CHANGE UP
SPECIMEN SOURCE: SIGNIFICANT CHANGE UP
SPECIMEN SOURCE: SIGNIFICANT CHANGE UP

## 2019-03-24 ENCOUNTER — INPATIENT (INPATIENT)
Facility: HOSPITAL | Age: 69
LOS: 42 days | Discharge: HOME | DRG: 559 | End: 2019-05-06
Attending: FAMILY MEDICINE | Admitting: FAMILY MEDICINE
Payer: MEDICARE

## 2019-03-24 VITALS
OXYGEN SATURATION: 98 % | DIASTOLIC BLOOD PRESSURE: 63 MMHG | WEIGHT: 85.1 LBS | HEART RATE: 115 BPM | HEIGHT: 56 IN | SYSTOLIC BLOOD PRESSURE: 103 MMHG | RESPIRATION RATE: 14 BRPM

## 2019-03-24 DIAGNOSIS — F03.90 UNSPECIFIED DEMENTIA WITHOUT BEHAVIORAL DISTURBANCE: ICD-10-CM

## 2019-03-24 DIAGNOSIS — I10 ESSENTIAL (PRIMARY) HYPERTENSION: ICD-10-CM

## 2019-03-24 DIAGNOSIS — Z96.659 PRESENCE OF UNSPECIFIED ARTIFICIAL KNEE JOINT: Chronic | ICD-10-CM

## 2019-03-24 DIAGNOSIS — Z86.718 PERSONAL HISTORY OF OTHER VENOUS THROMBOSIS AND EMBOLISM: ICD-10-CM

## 2019-03-24 DIAGNOSIS — Z98.42 CATARACT EXTRACTION STATUS, LEFT EYE: Chronic | ICD-10-CM

## 2019-03-24 DIAGNOSIS — R53.2 FUNCTIONAL QUADRIPLEGIA: ICD-10-CM

## 2019-03-24 DIAGNOSIS — S42.293A OTHER DISPLACED FRACTURE OF UPPER END OF UNSPECIFIED HUMERUS, INITIAL ENCOUNTER FOR CLOSED FRACTURE: Chronic | ICD-10-CM

## 2019-03-24 DIAGNOSIS — Z90.710 ACQUIRED ABSENCE OF BOTH CERVIX AND UTERUS: Chronic | ICD-10-CM

## 2019-03-24 DIAGNOSIS — R50.9 FEVER, UNSPECIFIED: ICD-10-CM

## 2019-03-24 DIAGNOSIS — D50.9 IRON DEFICIENCY ANEMIA, UNSPECIFIED: ICD-10-CM

## 2019-03-24 DIAGNOSIS — I50.22 CHRONIC SYSTOLIC (CONGESTIVE) HEART FAILURE: ICD-10-CM

## 2019-03-24 DIAGNOSIS — R65.10 SYSTEMIC INFLAMMATORY RESPONSE SYNDROME (SIRS) OF NON-INFECTIOUS ORIGIN WITHOUT ACUTE ORGAN DYSFUNCTION: ICD-10-CM

## 2019-03-24 DIAGNOSIS — L89.154 PRESSURE ULCER OF SACRAL REGION, STAGE 4: ICD-10-CM

## 2019-03-24 DIAGNOSIS — J45.909 UNSPECIFIED ASTHMA, UNCOMPLICATED: ICD-10-CM

## 2019-03-24 LAB
ALBUMIN SERPL ELPH-MCNC: 3.1 G/DL — LOW (ref 3.3–5)
ALP SERPL-CCNC: 115 U/L — SIGNIFICANT CHANGE UP (ref 40–120)
ALT FLD-CCNC: 10 U/L — SIGNIFICANT CHANGE UP (ref 10–45)
ANION GAP SERPL CALC-SCNC: 15 MMOL/L — SIGNIFICANT CHANGE UP (ref 5–17)
APPEARANCE UR: ABNORMAL
AST SERPL-CCNC: 11 U/L — SIGNIFICANT CHANGE UP (ref 10–40)
BILIRUB SERPL-MCNC: 0.3 MG/DL — SIGNIFICANT CHANGE UP (ref 0.2–1.2)
BILIRUB UR-MCNC: NEGATIVE — SIGNIFICANT CHANGE UP
BUN SERPL-MCNC: 41 MG/DL — HIGH (ref 7–23)
CALCIUM SERPL-MCNC: 9.5 MG/DL — SIGNIFICANT CHANGE UP (ref 8.4–10.5)
CHLORIDE SERPL-SCNC: 92 MMOL/L — LOW (ref 96–108)
CO2 SERPL-SCNC: 25 MMOL/L — SIGNIFICANT CHANGE UP (ref 22–31)
COLOR SPEC: YELLOW — SIGNIFICANT CHANGE UP
CREAT SERPL-MCNC: 0.4 MG/DL — LOW (ref 0.5–1.3)
DIFF PNL FLD: ABNORMAL
GAS PNL BLDV: SIGNIFICANT CHANGE UP
GLUCOSE SERPL-MCNC: 157 MG/DL — HIGH (ref 70–99)
GLUCOSE UR QL: NEGATIVE — SIGNIFICANT CHANGE UP
HCT VFR BLD CALC: 27.3 % — LOW (ref 34.5–45)
HGB BLD-MCNC: 9.1 G/DL — LOW (ref 11.5–15.5)
KETONES UR-MCNC: NEGATIVE — SIGNIFICANT CHANGE UP
LEUKOCYTE ESTERASE UR-ACNC: ABNORMAL
LYMPHOCYTES # BLD AUTO: 1.1 K/UL — SIGNIFICANT CHANGE UP (ref 1–3.3)
LYMPHOCYTES # BLD AUTO: 8 % — LOW (ref 13–44)
MCHC RBC-ENTMCNC: 29.7 PG — SIGNIFICANT CHANGE UP (ref 27–34)
MCHC RBC-ENTMCNC: 33.2 GM/DL — SIGNIFICANT CHANGE UP (ref 32–36)
MCV RBC AUTO: 89.5 FL — SIGNIFICANT CHANGE UP (ref 80–100)
MONOCYTES # BLD AUTO: 1.6 K/UL — HIGH (ref 0–0.9)
MONOCYTES NFR BLD AUTO: 4 % — SIGNIFICANT CHANGE UP (ref 2–14)
NEUTROPHILS # BLD AUTO: 20.7 K/UL — HIGH (ref 1.8–7.4)
NEUTROPHILS NFR BLD AUTO: 87 % — HIGH (ref 43–77)
NITRITE UR-MCNC: NEGATIVE — SIGNIFICANT CHANGE UP
PH UR: 7 — SIGNIFICANT CHANGE UP (ref 5–8)
PLATELET # BLD AUTO: 694 K/UL — HIGH (ref 150–400)
POTASSIUM SERPL-MCNC: 5 MMOL/L — SIGNIFICANT CHANGE UP (ref 3.5–5.3)
POTASSIUM SERPL-SCNC: 5 MMOL/L — SIGNIFICANT CHANGE UP (ref 3.5–5.3)
PROT SERPL-MCNC: 6.3 G/DL — SIGNIFICANT CHANGE UP (ref 6–8.3)
PROT UR-MCNC: ABNORMAL
RAPID RVP RESULT: SIGNIFICANT CHANGE UP
RBC # BLD: 3.05 M/UL — LOW (ref 3.8–5.2)
RBC # FLD: 15.7 % — HIGH (ref 10.3–14.5)
SODIUM SERPL-SCNC: 132 MMOL/L — LOW (ref 135–145)
SP GR SPEC: 1.02 — SIGNIFICANT CHANGE UP (ref 1.01–1.02)
UROBILINOGEN FLD QL: NEGATIVE — SIGNIFICANT CHANGE UP
WBC # BLD: 23.6 K/UL — HIGH (ref 3.8–10.5)
WBC # FLD AUTO: 23.6 K/UL — HIGH (ref 3.8–10.5)

## 2019-03-24 PROCEDURE — 99285 EMERGENCY DEPT VISIT HI MDM: CPT | Mod: GC

## 2019-03-24 PROCEDURE — 71045 X-RAY EXAM CHEST 1 VIEW: CPT | Mod: 26

## 2019-03-24 RX ORDER — ACETAMINOPHEN 500 MG
650 TABLET ORAL ONCE
Qty: 0 | Refills: 0 | Status: DISCONTINUED | OUTPATIENT
Start: 2019-03-24 | End: 2019-03-24

## 2019-03-24 RX ORDER — ERGOCALCIFEROL 1.25 MG/1
50000 CAPSULE ORAL
Qty: 0 | Refills: 0 | Status: DISCONTINUED | OUTPATIENT
Start: 2019-03-24 | End: 2019-04-29

## 2019-03-24 RX ORDER — CLONAZEPAM 1 MG
0.5 TABLET ORAL THREE TIMES A DAY
Qty: 0 | Refills: 0 | Status: DISCONTINUED | OUTPATIENT
Start: 2019-03-24 | End: 2019-03-26

## 2019-03-24 RX ORDER — MEROPENEM 1 G/30ML
1000 INJECTION INTRAVENOUS EVERY 8 HOURS
Qty: 0 | Refills: 0 | Status: DISCONTINUED | OUTPATIENT
Start: 2019-03-24 | End: 2019-03-26

## 2019-03-24 RX ORDER — FAMOTIDINE 10 MG/ML
20 INJECTION INTRAVENOUS DAILY
Qty: 0 | Refills: 0 | Status: DISCONTINUED | OUTPATIENT
Start: 2019-03-24 | End: 2019-05-06

## 2019-03-24 RX ORDER — APIXABAN 2.5 MG/1
5 TABLET, FILM COATED ORAL EVERY 12 HOURS
Qty: 0 | Refills: 0 | Status: DISCONTINUED | OUTPATIENT
Start: 2019-03-24 | End: 2019-04-02

## 2019-03-24 RX ORDER — PETROLATUM,WHITE
1 JELLY (GRAM) TOPICAL THREE TIMES A DAY
Qty: 0 | Refills: 0 | Status: DISCONTINUED | OUTPATIENT
Start: 2019-03-24 | End: 2019-05-06

## 2019-03-24 RX ORDER — BUDESONIDE AND FORMOTEROL FUMARATE DIHYDRATE 160; 4.5 UG/1; UG/1
2 AEROSOL RESPIRATORY (INHALATION)
Qty: 0 | Refills: 0 | Status: DISCONTINUED | OUTPATIENT
Start: 2019-03-24 | End: 2019-04-19

## 2019-03-24 RX ORDER — IPRATROPIUM/ALBUTEROL SULFATE 18-103MCG
3 AEROSOL WITH ADAPTER (GRAM) INHALATION EVERY 6 HOURS
Qty: 0 | Refills: 0 | Status: DISCONTINUED | OUTPATIENT
Start: 2019-03-24 | End: 2019-05-02

## 2019-03-24 RX ORDER — GABAPENTIN 400 MG/1
300 CAPSULE ORAL
Qty: 0 | Refills: 0 | Status: DISCONTINUED | OUTPATIENT
Start: 2019-03-24 | End: 2019-05-06

## 2019-03-24 RX ORDER — SODIUM CHLORIDE 9 MG/ML
250 INJECTION INTRAMUSCULAR; INTRAVENOUS; SUBCUTANEOUS EVERY 8 HOURS
Qty: 0 | Refills: 0 | Status: COMPLETED | OUTPATIENT
Start: 2019-03-24 | End: 2019-03-25

## 2019-03-24 RX ORDER — ACETAMINOPHEN 500 MG
650 TABLET ORAL EVERY 6 HOURS
Qty: 0 | Refills: 0 | Status: DISCONTINUED | OUTPATIENT
Start: 2019-03-24 | End: 2019-05-06

## 2019-03-24 RX ORDER — ACETAMINOPHEN 500 MG
650 TABLET ORAL ONCE
Qty: 0 | Refills: 0 | Status: COMPLETED | OUTPATIENT
Start: 2019-03-24 | End: 2019-03-24

## 2019-03-24 RX ORDER — SODIUM CHLORIDE 9 MG/ML
1000 INJECTION INTRAMUSCULAR; INTRAVENOUS; SUBCUTANEOUS ONCE
Qty: 0 | Refills: 0 | Status: COMPLETED | OUTPATIENT
Start: 2019-03-24 | End: 2019-03-24

## 2019-03-24 RX ORDER — FERROUS SULFATE 325(65) MG
300 TABLET ORAL DAILY
Qty: 0 | Refills: 0 | Status: DISCONTINUED | OUTPATIENT
Start: 2019-03-24 | End: 2019-05-06

## 2019-03-24 RX ORDER — ALBUTEROL 90 UG/1
1 AEROSOL, METERED ORAL EVERY 4 HOURS
Qty: 0 | Refills: 0 | Status: DISCONTINUED | OUTPATIENT
Start: 2019-03-24 | End: 2019-05-06

## 2019-03-24 RX ORDER — MEROPENEM 1 G/30ML
1000 INJECTION INTRAVENOUS ONCE
Qty: 0 | Refills: 0 | Status: COMPLETED | OUTPATIENT
Start: 2019-03-24 | End: 2019-03-24

## 2019-03-24 RX ORDER — NYSTATIN CREAM 100000 [USP'U]/G
1 CREAM TOPICAL
Qty: 0 | Refills: 0 | Status: DISCONTINUED | OUTPATIENT
Start: 2019-03-24 | End: 2019-05-06

## 2019-03-24 RX ORDER — SODIUM CHLORIDE 9 MG/ML
500 INJECTION INTRAMUSCULAR; INTRAVENOUS; SUBCUTANEOUS ONCE
Qty: 0 | Refills: 0 | Status: COMPLETED | OUTPATIENT
Start: 2019-03-24 | End: 2019-03-24

## 2019-03-24 RX ORDER — TIOTROPIUM BROMIDE 18 UG/1
1 CAPSULE ORAL; RESPIRATORY (INHALATION) DAILY
Qty: 0 | Refills: 0 | Status: DISCONTINUED | OUTPATIENT
Start: 2019-03-24 | End: 2019-05-06

## 2019-03-24 RX ADMIN — MEROPENEM 100 MILLIGRAM(S): 1 INJECTION INTRAVENOUS at 17:53

## 2019-03-24 RX ADMIN — SODIUM CHLORIDE 1000 MILLILITER(S): 9 INJECTION INTRAMUSCULAR; INTRAVENOUS; SUBCUTANEOUS at 19:45

## 2019-03-24 RX ADMIN — SODIUM CHLORIDE 1000 MILLILITER(S): 9 INJECTION INTRAMUSCULAR; INTRAVENOUS; SUBCUTANEOUS at 17:53

## 2019-03-24 RX ADMIN — Medication 650 MILLIGRAM(S): at 20:37

## 2019-03-24 RX ADMIN — Medication 650 MILLIGRAM(S): at 21:45

## 2019-03-24 RX ADMIN — SODIUM CHLORIDE 500 MILLILITER(S): 9 INJECTION INTRAMUSCULAR; INTRAVENOUS; SUBCUTANEOUS at 20:37

## 2019-03-24 RX ADMIN — MEROPENEM 1000 MILLIGRAM(S): 1 INJECTION INTRAVENOUS at 19:45

## 2019-03-24 NOTE — ED PROVIDER NOTE - CLINICAL SUMMARY MEDICAL DECISION MAKING FREE TEXT BOX
68 F with multiple admissions for fever thought to be due to hip infection, now with fever. will obtain infectious workup, empiric abx, and d/w Dr carrillo

## 2019-03-24 NOTE — H&P ADULT - PROBLEM SELECTOR PLAN 5
Patient is functionally quadriplegic. Patient is functionally quadriplegic. and requires turning.  Given extent of patient's debility, recurrent hospitalizations, palliative evaluation and goals of care discussion may be helpful for patient's family to understand prognosis.

## 2019-03-24 NOTE — H&P ADULT - NSICDXPASTSURGICALHX_GEN_ALL_CORE_FT
PAST SURGICAL HISTORY:  H/O cataract extraction, left     H/O: hysterectomy     History of knee replacement     Humeral head fracture

## 2019-03-24 NOTE — ED PROVIDER NOTE - CHIEF COMPLAINT
The patient is a 68y Female complaining of fever The patient is a 68y Female complaining of fever info from family

## 2019-03-24 NOTE — H&P ADULT - NSHPLABSRESULTS_GEN_ALL_CORE
Labs, imaging and EKG personally reviewed by me.     WBC is elevated 23.6 with neutrophil predominance. Pt is anemic with Hgb of 9.1. Pt has thrombocytosis with platelet count of 694.    Pt has mild hyponatremia with Na of 132.   BUN elevated at 41.   Lactate 1.8.     LABS:                        9.1    23.6  )-----------( 694      ( 24 Mar 2019 18:05 )             27.3     Hgb Trend: 9.1<--, 8.4<--      132<L>  |  92<L>  |  41<H>  ----------------------------<  157<H>  5.0   |  25  |  0.40<L>    Ca    9.5      24 Mar 2019 18:05    TPro  6.3  /  Alb  3.1<L>  /  TBili  0.3  /  DBili  x   /  AST  11  /  ALT  10  /  AlkPhos  115      Creatinine Trend: 0.40<--, 0.32<--, 0.31<--, 0.32<--, 0.34<--, 0.48<--    Urinalysis Basic - ( 24 Mar 2019 21:06 )    Color: Yellow / Appearance: Slightly Turbid / S.017 / pH: x  Gluc: x / Ketone: Negative  / Bili: Negative / Urobili: Negative   Blood: x / Protein: 30 mg/dL / Nitrite: Negative   Leuk Esterase: Large / RBC: 41 /hpf /  /HPF   Sq Epi: x / Non Sq Epi: 0 /hpf / Bacteria: Negative    Venous Blood Gas:   @ 18:05  7.42/45/31/28/46  VBG Lactate: 1.8    INTERPRETATION:  Limited frontal view. Radiopaque density overlying the left lung base likely outside of the patient . Repeat PA/lateral can be obtained for better evaluation    < end of copied text >

## 2019-03-24 NOTE — H&P ADULT - NSICDXFAMILYHX_GEN_ALL_CORE_FT
FAMILY HISTORY:  Grandparent  Still living? Unknown  Family history of colon cancer, Age at diagnosis: Age Unknown  Family history of dementia, Age at diagnosis: Age Unknown

## 2019-03-24 NOTE — H&P ADULT - NSHPSOCIALHISTORY_GEN_ALL_CORE
The patient is functionally quadriplegic.   She lives with her .  She has 2 daughters.  Patient has no history of tobacco, alcohol or drug use.

## 2019-03-24 NOTE — H&P ADULT - PROBLEM SELECTOR PLAN 3
Pt's MAP is currently 73mmHg. Lungs are clear anteriorly to auscultation.  Will give additional 250cc x1.   Monitor volume status.   Check strict I&Os.

## 2019-03-24 NOTE — H&P ADULT - PROBLEM SELECTOR PLAN 6
Patient has advanced dementia.   Continue with home dose of gabapentin, quetiapine, clonazepam (with hold parameters), medical marijuana   - fall precautions  - provide frequent reorientation  - pt does not follow commands A&O x0  - Start home dose of tube feeds. Patient has advanced dementia.   Continue with home dose of gabapentin and clonazepam (with hold parameters), Hold quetiapine for now.   medical marijuana   - fall precautions  - provide frequent reorientation  - pt does not follow commands A&O x0  - Start home dose of tube feeds.

## 2019-03-24 NOTE — ED PROVIDER NOTE - OBJECTIVE STATEMENT
68 F with dementia from home with  for fever. T102 at home last night. Patient has long history of fever with multiple admissions recently thought to be due to hip infection. Patient unable to participate in history due to dementia. Sent by Dr Bolden

## 2019-03-24 NOTE — H&P ADULT - PROBLEM SELECTOR PLAN 2
Meets SIRS criteria based on fever and tachycardia Leukocytosis with neutrophillia suggestive of likely infectious cause. Check procalcitonin.   Blood cultures, urine cultures obtained.  Will obtain sputum culture.    Obtain CT chest  to evaluate for potential PNA.  Patient had recent imaging of back  Pt is due for gavin change later this week.   Monitor VS q4h.  Start tylenol PRN for fever  During last admission, pt deferred on bone marrow biopsy proposed by hematology team to assess for myeloproliferative causes of fever.   Pt had indum -111 labeled leukocyte study which did not identify source of infection on 3/12/2019  Obtain MRI eval for sacral decubitus ulcer to assess for osteomyelitis. Meets SIRS criteria based on fever and tachycardia Leukocytosis with neutrophillia suggestive of likely infectious cause. Check procalcitonin.   Blood cultures, urine cultures obtained.  Will obtain sputum culture.    Obtain CT chest  to evaluate for potential PNA.  Patient had recent imaging of back  Pt is due for gavin change later this week.   Monitor VS q4h.  Start tylenol PRN for fever  During last admission, pt deferred on bone marrow biopsy proposed by hematology team to assess for myeloproliferative causes of fever.   Pt had indum -111 labeled leukocyte study which did not identify source of infection on 3/12/2019  Obtain MRI eval for sacral decubitus ulcer to assess for osteomyelitis.  Family reports pt has had very loose stools. Given chronically on antibx, will check GI PCR and stool C diff. Meets SIRS criteria based on fever and tachycardia Leukocytosis with neutrophillia suggestive of likely infectious cause. Check procalcitonin.   Blood cultures, urine cultures obtained.  Will obtain sputum culture.    Obtain CT chest  to evaluate for potential PNA.  Patient had recent imaging of back  Pt is due for gavin change later this week.   Monitor VS q4h.  Start tylenol PRN for fever  During last admission, pt deferred on bone marrow biopsy proposed by hematology team to assess for myeloproliferative causes of fever.   Pt had indum -111 labeled leukocyte study which did not identify source of infection on 3/12/2019  Obtain MRI eval for sacral decubitus ulcer to assess for osteomyelitis.  Family reports pt has had very loose stools. Given chronically on antibx, will check GI PCR and stool C diff.  Will start cooling blanket.   If pt develops hypotension, may require stress dose steroids. Meets SIRS criteria based on fever and tachycardia Leukocytosis with neutrophillia suggestive of likely infectious cause. Check procalcitonin.   Blood cultures, urine cultures obtained.  Will obtain sputum culture.    Obtain CT chest  to evaluate for potential PNA.  Patient had recent imaging of back  Pt is due for gavin change later this week.   Monitor VS q4h.  Start tylenol PRN for fever  During last admission, pt deferred on bone marrow biopsy proposed by hematology team to assess for myeloproliferative causes of fever.   Pt had indum -111 labeled leukocyte study which did not identify source of infection on 3/12/2019  Obtain MRI eval for sacral decubitus ulcer to assess for osteomyelitis.  Family reports pt has had very loose stools. Given chronically on antibx, will check GI PCR and stool C diff.  Will start cooling blanket.   If pt develops hypotension, may require stress dose steroids.  UA found few budding yeast like cells with WBCs. Patient has chronic gavin catheter and this may represent colonization of the bladder. Meets SIRS criteria based on fever and tachycardia Leukocytosis with neutrophillia suggestive of likely infectious cause. Check procalcitonin.   Blood cultures, urine cultures obtained.  Will obtain sputum culture.    Obtain CT chest  to evaluate for potential PNA.  Patient had recent imaging of back  Pt is due for gavin change later this week.   Monitor VS q4h.  Start tylenol PRN for fever  During last admission, pt deferred on bone marrow biopsy proposed by hematology team to assess for myeloproliferative causes of fever.   Pt had indum -111 labeled leukocyte study which did not identify source of infection on 3/12/2019  Obtain MRI eval for sacral decubitus ulcer to assess for osteomyelitis.  Family reports pt has had very loose stools. Given chronically on antibx, will check GI PCR and stool C diff.  Will start cooling blanket.   If pt develops hypotension, may require stress dose steroids.  UA found few budding yeast like cells with WBCs. Patient has chronic gavin catheter and this may represent colonization of the bladder, vs candida infection. Will treat with fluconazole for now. Meets SIRS criteria based on fever and tachycardia Leukocytosis with neutrophillia suggestive of likely infectious cause. Check procalcitonin.   Blood cultures, urine cultures obtained.  Will obtain sputum culture.    Obtain CT chest  to evaluate for potential PNA.  Pt is due for gavin change later this week.   Monitor VS q4h.  Start tylenol PRN for fever  During last admission, pt deferred on bone marrow biopsy proposed by hematology team to assess for myeloproliferative causes of fever.   Pt had indum -111 labeled leukocyte study which did not identify source of infection on 3/12/2019  Pt had recent CT a/p during last admission which recommended MRI to further assess known sacral ulcer. Obtain MRI eval for sacral decubitus ulcer to assess for osteomyelitis, abscess, soft tissue infection.  Family reports pt has had very loose stools. Given chronically on antibx, will check GI PCR and stool C diff.  Will start cooling blanket.   If pt develops hypotension, may require stress dose steroids.  UA found few budding yeast like cells with WBCs. Patient has chronic gavin catheter and this may represent colonization of the bladder, vs candida infection. Will treat with fluconazole for now.

## 2019-03-24 NOTE — H&P ADULT - ATTENDING COMMENTS
Patient assigned to me by night hospitalist in charge for management and care for patient for this evening only. Care to be resumed by day hospitalist in the morning and thereafter. Patient assigned to me by night hospitalist in charge for management and care for patient for this evening only. Care to be resumed by day hospitalist in the morning and thereafter.    Had extensive discussion with patient's daughters. Daughters state that patient's  is the HCP.  Patient is full code. Daughters state pressor support, mechanical ventilation, CPR all acceptable means of treatment if necessary.

## 2019-03-24 NOTE — H&P ADULT - NSHPPHYSICALEXAM_GEN_ALL_CORE
Vital Signs Last 24 Hrs  T(C): 38.4 (24 Mar 2019 21:45), Max: 38.4 (24 Mar 2019 21:45)  T(F): 101.2 (24 Mar 2019 21:45), Max: 101.2 (24 Mar 2019 21:45)  HR: 114 (24 Mar 2019 20:40) (114 - 115)  BP: 109/55 (24 Mar 2019 20:40) (103/63 - 109/55)  BP(mean): 71 (24 Mar 2019 20:40) (71 - 71)  RR: 24 (24 Mar 2019 20:40) (14 - 24)  SpO2: 95% (24 Mar 2019 20:40) (95% - 98%)    PHYSICAL EXAM:  GENERAL: NAD, lying in bed, functional quadriplegic, frail with contracted extremities  HEAD:  Atraumatic, Normocephalic  EYES: EOMI, PERRLA, conjunctiva and sclera clear  ENMT: No oropharyngeal exudates, erythema or lesions,  dry mucous membranes  NECK: Supple, no cervical lymphadenopathy  NERVOUS SYSTEM:  Alert & Oriented X 0, does not follow commands, awake  CHEST/LUNG: Clear to auscultation bilaterally on auscultation; No rales, no  rhonchi, no wheezing  HEART: Regular rhythm; tachycardic,  No murmurs, rubs, or gallops  ABDOMEN: Soft, Nontender, Nondistended, PEG tube in place without surrounding erythema or exudates.   EXTREMITIES:  2+ Peripheral Pulses, No clubbing, cyanosis, or edema, contracted upper and lower extremities,   R hip superficial wound with surrounding erythema and purulent drainage  R ankle wound with surrounding erythema and purulent drainage  LYMPH: No lymphadenopathy noted  SKIN: Large stage 4 sacral ulcer with minimal surrounding erythema and edema. Bone not visible. Some granulation tissue forming. No exudates.

## 2019-03-24 NOTE — ED ADULT NURSE REASSESSMENT NOTE - NS ED NURSE REASSESS COMMENT FT1
Pt turned and positioned for comfort. Sacral Pressure ulcer dressed packed with daken solution impregnated sterile gauze. [place]alon no stinge barrier applied surrounding wound. Pressure ulcer dressings changed for sacrum, elbows, hips, and ankles. Pillows placed to reduce pressure to bony deformities. Pt placed in position of comfort. Family present at bedside.

## 2019-03-24 NOTE — ED PROVIDER NOTE - NS ED MD EM SELECTION
The patient was contacted today to let her know that Dr. Gilbert prescribed her Tizanidine and it was sent to the [facility]. The patient states she understands and will follow up as needed.   
[id number] Comprehensive

## 2019-03-24 NOTE — H&P ADULT - ASSESSMENT
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 History was provided by patient's daughters, Tere and Angie at bedside. Patient has been having persistent fever at home up to 103F. She has appeared more lethargic for the last 2 days and has had labored breathing, but no cough. She has loose nonbloody stools. No recent emesis, melena or hematuria. Urine output via gavin catheter is normal yellow appearance and had "good output." Patient gets regular wound care but has persistent ulcers. Because of persistent fevers, pt was brought to ED.     This patient was previously hospitalized from 2/26-3/18/2019 for fevers, found to have septic shock 2/2 UTI vs sacral ulcer vs PNA, requiring pressor support and MICU stay. 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 Offer was made to change the spacer and get cultures, however at that time, the  did not want to put the patient through surgery. Pt completed a course of meropenem, and was resumed on suppressive minocycline.     In the ED, T 100.8 Rectal  , /63, RR 14, SpO2 98%RA  SEPTIC based on HR and temperature.   She was given IVNS 1.5L, acetaminophen 650mg and meropenem 1g. 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 Patient has recurrent hospitalizations, has significant comorbidities. Prognosis is guarded.

## 2019-03-24 NOTE — H&P ADULT - PROBLEM SELECTOR PLAN 4
Will request wound care consult.  Patient will need regularly scheduled turning to offload joints Will request wound care consult.  Patient will need regularly scheduled turning to offload joints  Will order MRI to assess for potential bony involvement  Check Will request wound care consult.  Patient will need regularly scheduled turning to offload joints  Will order MRI to assess for potential bony involvement

## 2019-03-24 NOTE — H&P ADULT - PROBLEM SELECTOR PLAN 1
Daughters at bedside reaffirm that they and the patient's  do not think the patient would be able to tolerate further invasive testing to assess for persistent R prosthetic hip infection.  Consult infectious disease team.  They requested Dr. Brand.   Will continue to treat empirically with meropenem.  Hold minocycline Daughters at bedside reaffirm that they and the patient's  do not think the patient would be able to tolerate further invasive testing to assess for persistent R prosthetic hip infection.  Consulted infectious disease Dr. Powell's team to discuss antibiotic coverage, and spoke with [facility] center, awaiting callback.   Will continue to treat empirically with meropenem.  Hold minocycline

## 2019-03-24 NOTE — ED ADULT NURSE REASSESSMENT NOTE - NS ED NURSE REASSESS COMMENT FT1
Received report from Michele Bennett RN. Pt observed resting comfortably in stretcher. Multiple pressure ulcers present upon arrival (stage four to the sacral region, Daken impregnated gauze in place covered by pressure ulcer dressing) (pressure ulcers to the right heal towards the lateral aspect, pressure ulcer dressing changed) (bilateral hip pressure dressings changed). Pt remains febrile, tylenol order to be changed from pills to suspension and provided to patient via PEG tube. Urine samples to be obtained from indwelling urinary catheter in place (placed 2/28/2019, to be changed if urine returns positive).

## 2019-03-24 NOTE — H&P ADULT - HISTORY OF PRESENT ILLNESS
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 Offer was made to change the spacer and get cultures, however at that time, the  did not want to put the patient through surgery. Pt completed a course of meropenem, and was resumed on suppressive minocycline.       In the ED, T 100.8 Rectal  , /63, RR 14, SpO2 98%RA   SEPTIC  She was given IVNS 1.5L, acetaminophen 650mg and meropenem 1g. 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 History was provided by patient's daughters, Tere and Angie at bedside. Patient has been having persistent fever at home up to 103F. She has appeared more lethargic for the last 2 days and has had labored breathing, but no cough. She has loose nonbloody stools. No recent emesis, melena or hematuria. Urine output via gavin catheter is normal yellow appearance and had "good output." Patient gets regular wound care but has persistent ulcers. Because of persistent fevers, pt was brought to ED.     This patient was previously hospitalized from 2/26-3/18/2019 for fevers, found to have septic shock 2/2 UTI vs sacral ulcer vs PNA, requiring pressor support and MICU stay. 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 Offer was made to change the spacer and get cultures, however at that time, the  did not want to put the patient through surgery. Pt completed a course of meropenem, and was resumed on suppressive minocycline.     In the ED, T 100.8 Rectal  , /63, RR 14, SpO2 98%RA  MEETS SIRS CRITERIA based on HR and temperature.   She was given IVNS 1.5L, acetaminophen 650mg and meropenem 1g.

## 2019-03-25 LAB
24R-OH-CALCIDIOL SERPL-MCNC: 45.1 NG/ML — SIGNIFICANT CHANGE UP (ref 30–80)
ALBUMIN SERPL ELPH-MCNC: 2.5 G/DL — LOW (ref 3.3–5)
ALP SERPL-CCNC: 97 U/L — SIGNIFICANT CHANGE UP (ref 40–120)
ALT FLD-CCNC: 10 U/L — SIGNIFICANT CHANGE UP (ref 10–45)
ANION GAP SERPL CALC-SCNC: 14 MMOL/L — SIGNIFICANT CHANGE UP (ref 5–17)
AST SERPL-CCNC: 17 U/L — SIGNIFICANT CHANGE UP (ref 10–40)
BASOPHILS # BLD AUTO: 0.08 K/UL — SIGNIFICANT CHANGE UP (ref 0–0.2)
BASOPHILS NFR BLD AUTO: 0.4 % — SIGNIFICANT CHANGE UP (ref 0–2)
BILIRUB SERPL-MCNC: 0.3 MG/DL — SIGNIFICANT CHANGE UP (ref 0.2–1.2)
BUN SERPL-MCNC: 36 MG/DL — HIGH (ref 7–23)
C DIFF GDH STL QL: NEGATIVE — SIGNIFICANT CHANGE UP
C DIFF GDH STL QL: SIGNIFICANT CHANGE UP
CALCIUM SERPL-MCNC: 8.8 MG/DL — SIGNIFICANT CHANGE UP (ref 8.4–10.5)
CHLORIDE SERPL-SCNC: 96 MMOL/L — SIGNIFICANT CHANGE UP (ref 96–108)
CO2 SERPL-SCNC: 22 MMOL/L — SIGNIFICANT CHANGE UP (ref 22–31)
CREAT SERPL-MCNC: 0.46 MG/DL — LOW (ref 0.5–1.3)
CRP SERPL-MCNC: 16.01 MG/DL — HIGH (ref 0–0.4)
CULTURE RESULTS: SIGNIFICANT CHANGE UP
EOSINOPHIL # BLD AUTO: 0.69 K/UL — HIGH (ref 0–0.5)
EOSINOPHIL NFR BLD AUTO: 3.9 % — SIGNIFICANT CHANGE UP (ref 0–6)
ERYTHROCYTE [SEDIMENTATION RATE] IN BLOOD: 41 MM/HR — HIGH (ref 0–20)
GLUCOSE BLDC GLUCOMTR-MCNC: 101 MG/DL — HIGH (ref 70–99)
GLUCOSE BLDC GLUCOMTR-MCNC: 211 MG/DL — HIGH (ref 70–99)
GLUCOSE SERPL-MCNC: 132 MG/DL — HIGH (ref 70–99)
HCT VFR BLD CALC: 23.9 % — LOW (ref 34.5–45)
HCV AB S/CO SERPL IA: 0.04 S/CO — SIGNIFICANT CHANGE UP (ref 0–0.79)
HCV AB SERPL-IMP: SIGNIFICANT CHANGE UP
HGB BLD-MCNC: 7.3 G/DL — LOW (ref 11.5–15.5)
IMM GRANULOCYTES NFR BLD AUTO: 1.4 % — SIGNIFICANT CHANGE UP (ref 0–1.5)
LYMPHOCYTES # BLD AUTO: 0.95 K/UL — LOW (ref 1–3.3)
LYMPHOCYTES # BLD AUTO: 5.3 % — LOW (ref 13–44)
MCHC RBC-ENTMCNC: 28.5 PG — SIGNIFICANT CHANGE UP (ref 27–34)
MCHC RBC-ENTMCNC: 30.5 GM/DL — LOW (ref 32–36)
MCV RBC AUTO: 93.4 FL — SIGNIFICANT CHANGE UP (ref 80–100)
MONOCYTES # BLD AUTO: 1.98 K/UL — HIGH (ref 0–0.9)
MONOCYTES NFR BLD AUTO: 11.1 % — SIGNIFICANT CHANGE UP (ref 2–14)
NEUTROPHILS # BLD AUTO: 13.94 K/UL — HIGH (ref 1.8–7.4)
NEUTROPHILS NFR BLD AUTO: 77.9 % — HIGH (ref 43–77)
PLATELET # BLD AUTO: 547 K/UL — HIGH (ref 150–400)
POTASSIUM SERPL-MCNC: 4.3 MMOL/L — SIGNIFICANT CHANGE UP (ref 3.5–5.3)
POTASSIUM SERPL-SCNC: 4.3 MMOL/L — SIGNIFICANT CHANGE UP (ref 3.5–5.3)
PROCALCITONIN SERPL-MCNC: 0.59 NG/ML — HIGH (ref 0.02–0.1)
PROT SERPL-MCNC: 5.2 G/DL — LOW (ref 6–8.3)
RBC # BLD: 2.56 M/UL — LOW (ref 3.8–5.2)
RBC # FLD: 16.6 % — HIGH (ref 10.3–14.5)
SODIUM SERPL-SCNC: 132 MMOL/L — LOW (ref 135–145)
SPECIMEN SOURCE: SIGNIFICANT CHANGE UP
WBC # BLD: 17.89 K/UL — HIGH (ref 3.8–10.5)
WBC # FLD AUTO: 17.89 K/UL — HIGH (ref 3.8–10.5)

## 2019-03-25 PROCEDURE — 99223 1ST HOSP IP/OBS HIGH 75: CPT

## 2019-03-25 PROCEDURE — 99232 SBSQ HOSP IP/OBS MODERATE 35: CPT

## 2019-03-25 PROCEDURE — 51702 INSERT TEMP BLADDER CATH: CPT

## 2019-03-25 PROCEDURE — 71250 CT THORAX DX C-: CPT | Mod: 26

## 2019-03-25 RX ORDER — SODIUM CHLORIDE 9 MG/ML
250 INJECTION INTRAMUSCULAR; INTRAVENOUS; SUBCUTANEOUS ONCE
Qty: 0 | Refills: 0 | Status: COMPLETED | OUTPATIENT
Start: 2019-03-25 | End: 2019-03-25

## 2019-03-25 RX ORDER — DIPHENHYDRAMINE HCL 50 MG
25 CAPSULE ORAL EVERY 6 HOURS
Qty: 0 | Refills: 0 | Status: DISCONTINUED | OUTPATIENT
Start: 2019-03-25 | End: 2019-03-26

## 2019-03-25 RX ORDER — MIDODRINE HYDROCHLORIDE 2.5 MG/1
10 TABLET ORAL EVERY 8 HOURS
Qty: 0 | Refills: 0 | Status: DISCONTINUED | OUTPATIENT
Start: 2019-03-25 | End: 2019-04-12

## 2019-03-25 RX ORDER — HYDROCORTISONE 20 MG
100 TABLET ORAL EVERY 8 HOURS
Qty: 0 | Refills: 0 | Status: DISCONTINUED | OUTPATIENT
Start: 2019-03-25 | End: 2019-04-12

## 2019-03-25 RX ORDER — FLUCONAZOLE 150 MG/1
TABLET ORAL
Qty: 0 | Refills: 0 | Status: DISCONTINUED | OUTPATIENT
Start: 2019-03-25 | End: 2019-03-28

## 2019-03-25 RX ORDER — FLUCONAZOLE 150 MG/1
200 TABLET ORAL ONCE
Qty: 0 | Refills: 0 | Status: COMPLETED | OUTPATIENT
Start: 2019-03-25 | End: 2019-03-25

## 2019-03-25 RX ORDER — DIPHENHYDRAMINE HCL 50 MG
50 CAPSULE ORAL ONCE
Qty: 0 | Refills: 0 | Status: COMPLETED | OUTPATIENT
Start: 2019-03-25 | End: 2019-03-29

## 2019-03-25 RX ORDER — VANCOMYCIN HCL 1 G
1000 VIAL (EA) INTRAVENOUS ONCE
Qty: 0 | Refills: 0 | Status: COMPLETED | OUTPATIENT
Start: 2019-03-25 | End: 2019-03-25

## 2019-03-25 RX ORDER — FLUCONAZOLE 150 MG/1
200 TABLET ORAL EVERY 24 HOURS
Qty: 0 | Refills: 0 | Status: DISCONTINUED | OUTPATIENT
Start: 2019-03-26 | End: 2019-03-28

## 2019-03-25 RX ORDER — VANCOMYCIN HCL 1 G
1000 VIAL (EA) INTRAVENOUS EVERY 24 HOURS
Qty: 0 | Refills: 0 | Status: DISCONTINUED | OUTPATIENT
Start: 2019-03-25 | End: 2019-05-06

## 2019-03-25 RX ORDER — SODIUM CHLORIDE 9 MG/ML
500 INJECTION INTRAMUSCULAR; INTRAVENOUS; SUBCUTANEOUS ONCE
Qty: 0 | Refills: 0 | Status: COMPLETED | OUTPATIENT
Start: 2019-03-25 | End: 2019-04-24

## 2019-03-25 RX ADMIN — Medication 650 MILLIGRAM(S): at 00:00

## 2019-03-25 RX ADMIN — APIXABAN 5 MILLIGRAM(S): 2.5 TABLET, FILM COATED ORAL at 23:16

## 2019-03-25 RX ADMIN — SODIUM CHLORIDE 250 MILLILITER(S): 9 INJECTION INTRAMUSCULAR; INTRAVENOUS; SUBCUTANEOUS at 15:44

## 2019-03-25 RX ADMIN — MEROPENEM 100 MILLIGRAM(S): 1 INJECTION INTRAVENOUS at 07:05

## 2019-03-25 RX ADMIN — Medication 1 APPLICATION(S): at 21:52

## 2019-03-25 RX ADMIN — GABAPENTIN 300 MILLIGRAM(S): 400 CAPSULE ORAL at 05:48

## 2019-03-25 RX ADMIN — FLUCONAZOLE 100 MILLIGRAM(S): 150 TABLET ORAL at 05:41

## 2019-03-25 RX ADMIN — Medication 83.33 MILLIGRAM(S): at 09:21

## 2019-03-25 RX ADMIN — Medication 100 MILLIGRAM(S): at 09:21

## 2019-03-25 RX ADMIN — Medication 3 MILLILITER(S): at 13:00

## 2019-03-25 RX ADMIN — Medication 7.5 MILLIGRAM(S): at 05:48

## 2019-03-25 RX ADMIN — MEROPENEM 100 MILLIGRAM(S): 1 INJECTION INTRAVENOUS at 15:43

## 2019-03-25 RX ADMIN — GABAPENTIN 300 MILLIGRAM(S): 400 CAPSULE ORAL at 23:16

## 2019-03-25 RX ADMIN — Medication 1 APPLICATION(S): at 05:49

## 2019-03-25 RX ADMIN — SODIUM CHLORIDE 250 MILLILITER(S): 9 INJECTION INTRAMUSCULAR; INTRAVENOUS; SUBCUTANEOUS at 05:40

## 2019-03-25 RX ADMIN — Medication 650 MILLIGRAM(S): at 06:05

## 2019-03-25 RX ADMIN — Medication 3 MILLILITER(S): at 02:30

## 2019-03-25 RX ADMIN — Medication 100 MILLIGRAM(S): at 21:44

## 2019-03-25 RX ADMIN — SODIUM CHLORIDE 250 MILLILITER(S): 9 INJECTION INTRAMUSCULAR; INTRAVENOUS; SUBCUTANEOUS at 08:01

## 2019-03-25 RX ADMIN — APIXABAN 5 MILLIGRAM(S): 2.5 TABLET, FILM COATED ORAL at 07:12

## 2019-03-25 RX ADMIN — MEROPENEM 100 MILLIGRAM(S): 1 INJECTION INTRAVENOUS at 23:11

## 2019-03-25 RX ADMIN — Medication 3 MILLILITER(S): at 05:47

## 2019-03-25 NOTE — ED ADULT NURSE NOTE - ED STAT RN HANDOFF DETAILS
Handoff report given to Meagan Masterson RN. Understands pmh, medications given and plan of care for patient. Patient in stable condition, vital signs updated, has no complaints at this time and has been updated on care plan. Explained to patient that it is change of shift and new nurse is taking over, pt verbalized understanding.

## 2019-03-25 NOTE — CONSULT NOTE ADULT - SUBJECTIVE AND OBJECTIVE BOX
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 History was provided by patient's daughters, Tere and Angie at bedside. Patient has been having persistent fever at home up to 103F. She has appeared more lethargic for the last 2 days and has had labored breathing, but no cough. She has loose nonbloody stools. No recent emesis, melena or hematuria. Urine output via gavin catheter is normal yellow appearance and had "good output." Patient gets regular wound care but has persistent ulcers. Because of persistent fevers, pt was brought to ED.     This patient was previously hospitalized from 2/26-3/18/2019 for fevers, found to have septic shock 2/2 UTI vs sacral ulcer vs PNA, requiring pressor support and MICU stay. 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 Offer was made to change the spacer and get cultures, however at that time, the  did not want to put the patient through surgery. Pt completed a course of meropenem, and was resumed on suppressive minocycline.     In the ED, T 100.8 Rectal  , /63, RR 14, SpO2 98%RA  MEETS SIRS CRITERIA based on HR and temperature.   She was given IVNS 1.5L, acetaminophen 650mg and meropenem 1g. (24 Mar 2019 20:54)      PAST MEDICAL & SURGICAL HISTORY:  CVA (cerebral vascular accident)  Fatty pancreas  PNA (pneumonia)  Pulmonary HTN  IGT (impaired glucose tolerance)  Ulcerative colitis  Acid reflux  Anxiety  Depression  Mouth sores  HLD (hyperlipidemia)  Asthma  Humeral head fracture  H/O: hysterectomy  H/O cataract extraction, left  History of knee replacement      Social history:    FAMILY HISTORY:  Family history of dementia (Grandparent)  Family history of colon cancer (Grandparent)    REVIEW OF SYSTEMS  General:	Denies any malaise fatigue or chills. Fevers absent                  ntinence	         Allergic/Immunologic:	No hives or rash   Allergies    ASA; dye contrast (Anaphylaxis)  aspirin (Short breath)  divalproex sodium (Other (Unknown))  Haldol (Other (Unknown))  penicillin (Short breath; Rash)  sulfa drugs (Short breath; Rash)  vancomycin (Rash; Urticaria; Hives)  Xanax (Other (Unknown))    Intolerances        Antimicrobials:    fluconAZOLE IVPB      meropenem  IVPB 1000 milliGRAM(s) IV Intermittent every 8 hours        Vital Signs Last 24 Hrs  T(C): 39.2 (25 Mar 2019 06:00), Max: 39.2 (25 Mar 2019 06:00)  T(F): 102.6 (25 Mar 2019 06:00), Max: 102.6 (25 Mar 2019 06:00)  HR: 103 (25 Mar 2019 11:03) (103 - 115)  BP: 101/49 (25 Mar 2019 11:03) (85/47 - 115/55)  BP(mean): 70 (25 Mar 2019 06:00) (70 - 71)  RR: 22 (25 Mar 2019 06:00) (14 - 24)  SpO2: 98% (25 Mar 2019 11:03) (94% - 100%)    PHYSICAL EXAM:Pleasant patient in no acute distress.           cachexia     Eyes:PERRL EOMI.NO discharge or conjunctival injection    ENMT:No sinus tenderness.No thrush.No pharyngeal exudate or erythema.Fair dental hygiene    Neck:Supple,No LN,no JVD      Respiratory:Good air entry bilaterally,CTA    Cardiovascular:S1 S2 wnl, No murmurs,rub or gallops    Gastrointestinal:Soft BS(+) no tenderness no masses ,No rebound or guarding    Genitourinary:No CVA tendereness     Rectal:    Extremities: contracted     Vascular:peripheral pulses felt    Neurological:AAO X 3,No grossly focal deficits    Skin:No rash     Lymph Nodes:No palpable LNs    Musculoskeletal:No joint swelling or LOM                                     7.3    17.89 )-----------( 547      ( 25 Mar 2019 09:03 )             23.9         03-25    132<L>  |  96  |  36<H>  ----------------------------<  132<H>  4.3   |  22  |  0.46<L>    Ca    8.8      25 Mar 2019 06:00    TPro  5.2<L>  /  Alb  2.5<L>  /  TBili  0.3  /  DBili  x   /  AST  17  /  ALT  10  /  AlkPhos  97  03-25      RECENT CULTURES:      MICROBIOLOGY:          Radiology:      Assessment:        Recommendations and Plan:    Pager [phone]  After 5 pm/weekends or if no response :[phone]

## 2019-03-25 NOTE — PROGRESS NOTE ADULT - SUBJECTIVE AND OBJECTIVE BOX
---___---___---___---___---___---___ ---___---___---___---___---___---___---___---___---___---                    <<<  M E D I C A L   A T T E N D I N G    F O L L O W    U P   N O T E  >>>    patient [place] [address] and also fever and found to have a uti as wel. has been suffered chronic fever and also has sacral decubitus as well      ---___---___---___---___---___---      <<<  MEDICATIONS:  >>>    MEDICATIONS  (STANDING):  ALBUTerol    90 MICROgram(s) HFA Inhaler 1 Puff(s) Inhalation every 4 hours  ALBUTerol/ipratropium for Nebulization 3 milliLiter(s) Nebulizer every 6 hours  apixaban 5 milliGRAM(s) Oral every 12 hours  buDESOnide 160 MICROgram(s)/formoterol 4.5 MICROgram(s) Inhaler 2 Puff(s) Inhalation two times a day  ergocalciferol 24468 Unit(s) Oral every week  famotidine    Tablet 20 milliGRAM(s) Oral daily  ferrous    sulfate Liquid 300 milliGRAM(s) Enteral Tube daily  fluconAZOLE IVPB      gabapentin   Solution 300 milliGRAM(s) Oral two times a day  meropenem  IVPB 1000 milliGRAM(s) IV Intermittent every 8 hours  petrolatum white Ointment 1 Application(s) Topical three times a day  predniSONE  Solution 7.5 milliGRAM(s) Oral daily  sodium chloride 0.9% Bolus 250 milliLiter(s) IV Bolus every 8 hours  tiotropium 18 MICROgram(s) Capsule 1 Capsule(s) Inhalation daily      MEDICATIONS  (PRN):  acetaminophen    Suspension .. 650 milliGRAM(s) Oral every 6 hours PRN Temp greater or equal to 38C (100.4F), Mild Pain (1 - 3), Moderate Pain (4 - 6)  clonazePAM Tablet 0.5 milliGRAM(s) Oral three times a day PRN agitation, anxiety  nystatin Powder 1 Application(s) Topical two times a day PRN rash/itchiness       ---___---___---___---___---___---     <<<REVIEW OF SYSTEM: >>>  unable to obtain      ---___---___---___---___---___---          <<<  VITAL SIGNS: >>>    T(F): 102.6 (19 @ 06:00), Max: 102.6 (19 @ 06:00)  HR: 110 (19 @ 06:00) (110 - 115)  BP: 101/56 (19 @ 06:00) (101/56 - 109/55)  RR: 22 (19 @ 06:00) (14 - 24)  SpO2: 100% (19 @ 06:00) (95% - 100%)  Wt(kg): --  CAPILLARY BLOOD GLUCOSE        I&O's Summary       ---___---___---___---___---___---                       PHYSICAL EXAM:    GEN: A&O X 0 , NAD , comfortable  HEENT: NCAT, PERRL, MMM, no scleral icterus, hearing intact  NECK: Supple, No JVD  CVS: S1S2 , regular , No M/R/G appreciated  PULM: CTA B/L,  no W/R/R appreciated  ABD.: soft. non tender, non distended,  bowel sounds present peg noted   Extrem: intact pulses , no edema noted contractures noted   Derm: No rash or ecchymosis noted sacral decubitus         ---___---___---___---___---___---     <<<  LAB AND IMAGING: >>>                          9.1    23.6  )-----------( 694      ( 24 Mar 2019 18:05 )             27.3               03-25    132<L>  |  96  |  36<H>  ----------------------------<  132<H>  4.3   |  22  |  0.46<L>    Ca    8.8      25 Mar 2019 06:00    TPro  5.2<L>  /  Alb  2.5<L>  /  TBili  0.3  /  DBili  x   /  AST  17  /  ALT  10  /  AlkPhos  97  03-25           Urinalysis Basic - ( 24 Mar 2019 21:06 )    Color: Yellow / Appearance: Slightly Turbid / S.017 / pH: x  Gluc: x / Ketone: Negative  / Bili: Negative / Urobili: Negative   Blood: x / Protein: 30 mg/dL / Nitrite: Negative   Leuk Esterase: Large / RBC: 41 /hpf /  /HPF   Sq Epi: x / Non Sq Epi: 0 /hpf / Bacteria: Negative                        [All pertinent / recent available Imaging reports and other labs reviewed]     ---___---___---___---___---___---___ ---___---___---___---___---           <<<  A S S E S S M E N T   A N D   P L A N :  >>>    sepsis  iv abx  id consult   uti as above urology to place gavin  asthma  continue budesonide  h/o dvt on eliquis   functional quadraplegia  supportive care  and pt to decrease contractures  sacral decubitus continue wound care         -GI/DVT Prophylaxis.    --------------------------------------------  Case discussed with   Education given on     >>______________________<<      Deniz Bolden .         phone   [phone]

## 2019-03-25 NOTE — CONSULT NOTE ADULT - ASSESSMENT
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 MICU consulted for persistent hypotension s/p IVF    Impression  - Severe sepsis. Lactate wnl. BP now 112/57    Recommendations  - Started hydrocortisone 100mg Q8hrs given long history of steroid use. D/gracie prednisone for now  - Added Vancomycin for MRSA coverage. Benadryl PRN for itching/rash. 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 MICU consulted for persistent hypotension s/p IVF    Impression  - Severe sepsis. Lactate wnl. BP now 112/57    Recommendations  - Started hydrocortisone 100mg Q8hrs given long history of steroid use. D/gracie prednisone for now  - Added Vancomycin for MRSA coverage. Benadryl PRN for itching/rash. 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 MICU consulted for persistent hypotension s/p IVF    Impression  - Severe sepsis. Lactate wnl. BP now 112/57    Recommendations  - Started hydrocortisone 100mg Q8hrs given long history of steroid use. D/gracie prednisone for now  - Added Vancomycin for MRSA coverage. Benadryl PRN for itching/rash. ID following  - Started Midodrine 10mg TID  - Continue cooling blanket  - Continue gentle IVF given low EF  - Follow up CT chest read   - Follow up cultures   - Strongly recommend palliative care consult   - Patient currently not a candidate for MICU. Please re-consult MICU if MAP persistently <60

## 2019-03-25 NOTE — ED ADULT NURSE REASSESSMENT NOTE - NS ED NURSE REASSESS COMMENT FT1
MICU resident consult done but rejected for temporary stability of bp and no lactate; 2nd 250cc bolus currently infusing.  per resident, he must consult with attending.  will continue to monitor.

## 2019-03-25 NOTE — ED ADULT NURSE REASSESSMENT NOTE - NS ED NURSE REASSESS COMMENT FT1
Indwelling urinary catheter removed due to patient's positive UTI and long term placement. Multiple RNs attempted urinary catheter insertion without success. Deniz Bolden aware, urology consult needed in past to place urinary catheter. Pt rectal temperature remains elevated, pt unable to be provided NSAIDs due to blood thinning medications. No cooling blankets present in ED. ALEXA Vo aware.

## 2019-03-25 NOTE — ED ADULT NURSE REASSESSMENT NOTE - NS ED NURSE REASSESS COMMENT FT1
pt found to be hypotensive with bp 85/47 kl=294 with elevated WBC's.  Md King notified and aware who is ordering a MICU consult for septic shock.  family at bedside.  will continue to monitor. pt found to be hypotensive with bp 85/47 el=947 with elevated WBC's.    pt revcd  250 cc bolus NS and tylenol 650 mg via peg tube; pt unable to receive adequate fluid resuscitation for CHF with EF=20%.  Md King notified and aware who is ordering a MICU consult for septic shock.  family at bedside.  will continue to monitor.

## 2019-03-25 NOTE — CONSULT NOTE ADULT - ASSESSMENT
68 yr old with advanced dementia , bed bound, contracted, cva, sp removal of infected hip due to mrsa with placement of space last fall.   Pt has been havening fevers for months.  I suspect there is ongoing Om of the hip site but changing the spacer is not a realistic option . chronic Om of the sacrum usually does not cause fevers and does not need prolonged ab therapy .      await cultures  add vanco to cover mrsa  after cultures , we cac decide on endpoint of ab therapy  will discuss with   palliative care consult ?

## 2019-03-25 NOTE — ED ADULT NURSE REASSESSMENT NOTE - NS ED NURSE REASSESS COMMENT FT1
pt recvd afternoon dose of medical marijuana from spouse; ok per prescribing md.  will continue to monitor.

## 2019-03-25 NOTE — ED ADULT NURSE NOTE - NS ED NURSE LEVEL OF CONSCIOUSNESS MENTAL STATUS
[Midline] : trachea located in midline position [Normal] : no rashes [FreeTextEntry1] : InsertProcedure: Microscopic Ear Exam\par \par Left ear:  Ear canal intact without inflammation or lesion.  \par Tympanic membrane intact without inflammation.\par \par Right ear:  Ear canal intact without inflammation or lesion.  \par Tympanic membrane intact without inflammation.\par \par InsertProcedure: Microscopic Ear Exam\par \par Left ear:  Ear canal intact without inflammation or lesion.  \par Tympanic membrane intact without inflammation.\par \par Right ear:  Ear canal intact without inflammation or lesion.  \par Tympanic membrane intact without inflammation.\par \par  Last day PO 1/9 Awake/Alert/Cooperative

## 2019-03-25 NOTE — PROGRESS NOTE ADULT - SUBJECTIVE AND OBJECTIVE BOX
SUBJECTIVE: Pt seen, chart reviewed.  69 yo female readmitted one week following discharge , after reportedly spiking fevers at home  Has a chronic Stage 4 sacral decubitus , reportedly improved    Allergies    ASA; dye contrast (Anaphylaxis)  aspirin (Short breath)  divalproex sodium (Other (Unknown))  Haldol (Other (Unknown))  penicillin (Short breath; Rash)  sulfa drugs (Short breath; Rash)  vancomycin (Rash; Urticaria; Hives)  Xanax (Other (Unknown))    Intolerances        apixaban 5 milliGRAM(s) Oral every 12 hours  fluconAZOLE IVPB      meropenem  IVPB 1000 milliGRAM(s) IV Intermittent every 8 hours  vancomycin  IVPB 1000 milliGRAM(s) IV Intermittent every 24 hours      PAST MEDICAL & SURGICAL HISTORY:  CVA (cerebral vascular accident)  Fatty pancreas  PNA (pneumonia)  Pulmonary HTN  IGT (impaired glucose tolerance)  Ulcerative colitis  Acid reflux  Anxiety  Depression  Mouth sores  HLD (hyperlipidemia)  Asthma  Humeral head fracture  H/O: hysterectomy  H/O cataract extraction, left  History of knee replacement      HEALTH ISSUES - PROBLEM Dx:  HTN (hypertension): HTN (hypertension)  Iron deficiency anemia: Iron deficiency anemia  Asthma: Asthma  History of DVT (deep vein thrombosis): History of DVT (deep vein thrombosis)  Dementia: Dementia  Functional quadriplegia: Functional quadriplegia  Pressure injury of sacral region, stage 4: Pressure injury of sacral region, stage 4  Chronic systolic heart failure: Chronic systolic heart failure  SIRS (systemic inflammatory response syndrome): SIRS (systemic inflammatory response syndrome)          FAMILY HISTORY:  Family history of dementia (Grandparent)  Family history of colon cancer (Grandparent)      Physical Exam:  Vital Signs Last 24 Hrs  T(C): 39.2 (25 Mar 2019 06:00), Max: 39.2 (25 Mar 2019 06:00)  T(F): 102.6 (25 Mar 2019 06:00), Max: 102.6 (25 Mar 2019 06:00)  HR: 103 (25 Mar 2019 11:03) (103 - 115)  BP: 101/49 (25 Mar 2019 11:03) (85/47 - 115/55)  BP(mean): 70 (25 Mar 2019 06:00) (70 - 71)  RR: 22 (25 Mar 2019 06:00) (14 - 24)  SpO2: 98% (25 Mar 2019 11:03) (94% - 100%)    Evaluated in ED   present  Right trochanteric wound , and heel wound are both stable , with no cellulitis, and no odor  Stage 4 sacral is also improved , in that bone in now covered with granulation tissue  NO odor of sacral wound, no need for sharp debridement  Will continue Dakins solution     Status d/w  Caleb, at bedside    remain available    LABS:                        7.3    17.89 )-----------( 547      ( 25 Mar 2019 09:03 )             23.9           Outpatient follow up information provided- [phone] SUBJECTIVE: Pt seen, chart reviewed.  67 yo female readmitted one week following discharge , after reportedly spiking fevers at home  Has a chronic Stage 4 sacral decubitus , reportedly improved  On contact isolation in ED, C Diff pending    Allergies    ASA; dye contrast (Anaphylaxis)  aspirin (Short breath)  divalproex sodium (Other (Unknown))  Haldol (Other (Unknown))  penicillin (Short breath; Rash)  sulfa drugs (Short breath; Rash)  vancomycin (Rash; Urticaria; Hives)  Xanax (Other (Unknown))    Intolerances        apixaban 5 milliGRAM(s) Oral every 12 hours  fluconAZOLE IVPB      meropenem  IVPB 1000 milliGRAM(s) IV Intermittent every 8 hours  vancomycin  IVPB 1000 milliGRAM(s) IV Intermittent every 24 hours      PAST MEDICAL & SURGICAL HISTORY:  CVA (cerebral vascular accident)  Fatty pancreas  PNA (pneumonia)  Pulmonary HTN  IGT (impaired glucose tolerance)  Ulcerative colitis  Acid reflux  Anxiety  Depression  Mouth sores  HLD (hyperlipidemia)  Asthma  Humeral head fracture  H/O: hysterectomy  H/O cataract extraction, left  History of knee replacement      HEALTH ISSUES - PROBLEM Dx:  HTN (hypertension): HTN (hypertension)  Iron deficiency anemia: Iron deficiency anemia  Asthma: Asthma  History of DVT (deep vein thrombosis): History of DVT (deep vein thrombosis)  Dementia: Dementia  Functional quadriplegia: Functional quadriplegia  Pressure injury of sacral region, stage 4: Pressure injury of sacral region, stage 4  Chronic systolic heart failure: Chronic systolic heart failure  SIRS (systemic inflammatory response syndrome): SIRS (systemic inflammatory response syndrome)          FAMILY HISTORY:  Family history of dementia (Grandparent)  Family history of colon cancer (Grandparent)      Physical Exam:  Vital Signs Last 24 Hrs  T(C): 39.2 (25 Mar 2019 06:00), Max: 39.2 (25 Mar 2019 06:00)  T(F): 102.6 (25 Mar 2019 06:00), Max: 102.6 (25 Mar 2019 06:00)  HR: 103 (25 Mar 2019 11:03) (103 - 115)  BP: 101/49 (25 Mar 2019 11:03) (85/47 - 115/55)  BP(mean): 70 (25 Mar 2019 06:00) (70 - 71)  RR: 22 (25 Mar 2019 06:00) (14 - 24)  SpO2: 98% (25 Mar 2019 11:03) (94% - 100%)    Evaluated in ED   present  Right trochanteric wound , and heel wound are both stable , with no cellulitis, and no odor  Stage 4 sacral is also improved , in that bone in now covered with granulation tissue  NO odor of sacral wound, no need for sharp debridement  Will continue Dakins solution     Status d/w  Caleb, at bedside    remain available    LABS:                        7.3    17.89 )-----------( 547      ( 25 Mar 2019 09:03 )             23.9           Outpatient follow up information provided- [phone]

## 2019-03-25 NOTE — ED ADULT NURSE REASSESSMENT NOTE - NS ED NURSE REASSESS COMMENT FT1
Pt had bowel movement. Pt cleansed with no rinse cleansing wipes. Pressure ulcer dressings re applied to sacral ulcer, bilateral hips, & right medial side of heel. Sacral ulcer packed with Dekan impregnated gauze. Rectal temperature obtained and lower than previous. Bony prominences protected with multiple pillows. Pt placed in position of comfort. Pt educated on call bell system and provided call bell. Bed in lowest position, wheels locked, appropriate side rails raised. Pt denies needs at this time.

## 2019-03-25 NOTE — CONSULT NOTE ADULT - SUBJECTIVE AND OBJECTIVE BOX
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 History was provided by patient's daughters, Tere and Angie at bedside. Patient has been having persistent fever at home up to 103F. She has appeared more lethargic for the last 2 days and has had labored breathing, but no cough. She has loose nonbloody stools. No recent emesis, melena or hematuria. Urine output via gavin catheter is normal yellow appearance and had "good output." Patient gets regular wound care but has persistent ulcers. Because of persistent fevers, pt was brought to ED.     This patient was previously hospitalized from -3/18/2019 for fevers, found to have septic shock 2/2 UTI vs sacral ulcer vs PNA, requiring pressor support and MICU stay. 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 Offer was made to change the spacer and get cultures, however at that time, the  did not want to put the patient through surgery. Pt completed a course of meropenem, and was resumed on suppressive minocycline. (24 Mar 2019 20:54)    ED course  - T 100.8 Rectal  , /63, RR 14, SpO2 98%RA    - She was given 2L IVF , acetaminophen 650mg and meropenem 1g   - MICU consulted for severe sepsis/septic shock    PAST MEDICAL & SURGICAL HISTORY:  CVA (cerebral vascular accident)  Fatty pancreas  PNA (pneumonia)  Pulmonary HTN  IGT (impaired glucose tolerance)  Ulcerative colitis  Acid reflux  Anxiety  Depression  Mouth sores  HLD (hyperlipidemia)  Asthma  Humeral head fracture  H/O: hysterectomy  H/O cataract extraction, left  History of knee replacement      FAMILY HISTORY:  Family history of dementia (Grandparent)  Family history of colon cancer (Grandparent)      SOCIAL HISTORY:  The patient is functionally quadriplegic.   She lives with her . S has 2 daughters. No history of tobacco, alcohol or drug use    Allergies    ASA; dye contrast (Anaphylaxis)  aspirin (Short breath)  divalproex sodium (Other (Unknown))  Haldol (Other (Unknown))  penicillin (Short breath; Rash)  sulfa drugs (Short breath; Rash)  vancomycin (Rash; Urticaria; Hives)  Xanax (Other (Unknown))    Intolerances        HOME MEDICATIONS:  Home Medications:  acetaminophen 160 mg/5 mL oral suspension: milliliter(s) by gastrostomy tube , As Needed - 3)  (24 Mar 2019 22:12)  budesonide-formoterol 160 mcg-4.5 mcg/inh inhalation aerosol: 2 puff(s) inhaled 2 times a day (24 Mar 2019 22:12)  clonazePAM 0.5 mg oral tablet: 3 tab(s) orally once a day (at bedtime) (24 Mar 2019 22:12)  ergocalciferol: 1 milliliter(s) by gastrostomy tube once a day (24 Mar 2019 22:12)  freetext medication  - controlled substance: 1 milliliter(s) orally  (24 Mar 2019 22:12)  gabapentin 250 mg/5 mL oral solution: 6 milliliter(s) orally 2 times a day (24 Mar 2019 22:12)  ipratropium-albuterol 0.5 mg-2.5 mg/3 mLinhalation solution: 3 milliliter(s) inhaled every 6 hours (24 Mar 2019 22:12)  metFORMIN 500 mg oral tablet: 1 tab(s) by gastrostomy tube once a day (at bedtime) (24 Mar 2019 22:12)  Pepcid 20 mg oral tablet: orally once a day (24 Mar 2019 22:12)  petrolatum topical ointment: 1 application topically 3 times a day (24 Mar 2019 22:12)  predniSONE 2.5 mg oral tablet: 3 tab(s) orally once a day (24 Mar 2019 22:12)  QUEtiapine 25 mg oral tablet: orally once a day (at bedtime) (24 Mar 2019 22:12)      REVIEW OF SYSTEMS:  [*] Unable to assess ROS due to clinical status    OBJECTIVE:  ICU Vital Signs Last 24 Hrs  T(C): 39.2 (25 Mar 2019 06:00), Max: 39.2 (25 Mar 2019 06:00)  T(F): 102.6 (25 Mar 2019 06:00), Max: 102.6 (25 Mar 2019 06:00)  HR: 103 (25 Mar 2019 11:03) (103 - 115)  BP: 101/49 (25 Mar 2019 11:03) (85/47 - 115/55)  BP(mean): 70 (25 Mar 2019 06:00) (70 - 71)  ABP: --  ABP(mean): --  RR: 22 (25 Mar 2019 06:00) (14 - 24)  SpO2: 98% (25 Mar 2019 11:03) (94% - 100%)        CAPILLARY BLOOD GLUCOSE          PHYSICAL EXAM:  VS: BP , HR , RR 18, SpO2 100% while breathing ambient air  GENERAL: NAD  HEAD:  Atraumatic, Normocephalic  EYES: EOMI, conjunctiva and sclera clear  NECK: Supple, No JVD  CHEST/LUNG: Clear to auscultation bilaterally; No wheeze  HEART: Regular rate and rhythm; No murmurs, rubs, or gallops  ABDOMEN: Soft, Nontender, Nondistended; Bowel sounds present  EXTREMITIES:  2+ Peripheral Pulses, No clubbing, cyanosis, or edema  PSYCH: AAOx3  NEUROLOGY: non-focal  SKIN: No rashes or lesions    LINES:     HOSPITAL MEDICATIONS:  Standing Meds:  ALBUTerol    90 MICROgram(s) HFA Inhaler 1 Puff(s) Inhalation every 4 hours  ALBUTerol/ipratropium for Nebulization 3 milliLiter(s) Nebulizer every 6 hours  apixaban 5 milliGRAM(s) Oral every 12 hours  buDESOnide 160 MICROgram(s)/formoterol 4.5 MICROgram(s) Inhaler 2 Puff(s) Inhalation two times a day  ergocalciferol 16977 Unit(s) Oral every week  famotidine    Tablet 20 milliGRAM(s) Oral daily  ferrous    sulfate Liquid 300 milliGRAM(s) Enteral Tube daily  fluconAZOLE IVPB      gabapentin   Solution 300 milliGRAM(s) Oral two times a day  hydrocortisone sodium succinate Injectable 100 milliGRAM(s) IV Push every 8 hours  meropenem  IVPB 1000 milliGRAM(s) IV Intermittent every 8 hours  midodrine 10 milliGRAM(s) Oral every 8 hours  petrolatum white Ointment 1 Application(s) Topical three times a day  sodium chloride 0.9% Bolus 250 milliLiter(s) IV Bolus every 8 hours  sodium chloride 0.9% Bolus 500 milliLiter(s) IV Bolus once  tiotropium 18 MICROgram(s) Capsule 1 Capsule(s) Inhalation daily  vancomycin  IVPB 1000 milliGRAM(s) IV Intermittent every 24 hours      PRN Meds:  acetaminophen    Suspension .. 650 milliGRAM(s) Oral every 6 hours PRN  clonazePAM Tablet 0.5 milliGRAM(s) Oral three times a day PRN  diphenhydrAMINE 25 milliGRAM(s) Oral every 6 hours PRN  diphenhydrAMINE   Injectable 50 milliGRAM(s) IV Push once PRN  nystatin Powder 1 Application(s) Topical two times a day PRN      LABS:                        7.3    17.89 )-----------( 547      ( 25 Mar 2019 09:03 )             23.9     Hgb Trend: 7.3<--, 9.1<--      132<L>  |  96  |  36<H>  ----------------------------<  132<H>  4.3   |  22  |  0.46<L>    Ca    8.8      25 Mar 2019 06:00    TPro  5.2<L>  /  Alb  2.5<L>  /  TBili  0.3  /  DBili  x   /  AST  17  /  ALT  10  /  AlkPhos  97      Creatinine Trend: 0.46<--, 0.40<--, 0.32<--, 0.31<--, 0.32<--, 0.34<--    Urinalysis Basic - ( 24 Mar 2019 21:06 )    Color: Yellow / Appearance: Slightly Turbid / S.017 / pH: x  Gluc: x / Ketone: Negative  / Bili: Negative / Urobili: Negative   Blood: x / Protein: 30 mg/dL / Nitrite: Negative   Leuk Esterase: Large / RBC: 41 /hpf /  /HPF   Sq Epi: x / Non Sq Epi: 0 /hpf / Bacteria: Negative        Venous Blood Gas:   @ 18:05  7.42/45/31/28/46  VBG Lactate: 1.8      MICROBIOLOGY:       RADIOLOGY:  [ ] Reviewed and interpreted by me    EKG: CHIEF COMPLAINT:    HPI:  This patient is a 68yoF with PMH of advanced dementia (nonverbal, dysphagia with PEG tube, functional quadriplegic, chronic gavin catheter), sacral stage 4 pressure ulcer, heel pressure ulcers, asthma on chronic prednisone., hLd, CVA, UC (in remission for 30years), 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 History was provided by patient's daughters, Tere and Angie at bedside. Patient has been having persistent fever at home up to 103F. She has appeared more lethargic for the last 2 days and has had labored breathing, but no cough. She has loose nonbloody stools. No recent emesis, melena or hematuria. Urine output via gavin catheter is normal yellow appearance and had "good output." Patient gets regular wound care but has persistent ulcers. Because of persistent fevers, pt was brought to ED.     This patient was previously hospitalized from -3/18/2019 for fevers, found to have septic shock 2/2 UTI vs sacral ulcer vs PNA, requiring pressor support and MICU stay. 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 Offer was made to change the spacer and get cultures, however at that time, the  did not want to put the patient through surgery. Pt completed a course of meropenem, and was resumed on suppressive minocycline. (24 Mar 2019 20:54)    ED course  - T 100.8 Rectal  , /63, RR 14, SpO2 98%RA    - She was given 2L IVF , acetaminophen 650mg and meropenem 1g   - MICU consulted for severe sepsis/septic shock    PAST MEDICAL & SURGICAL HISTORY:  CVA (cerebral vascular accident)  Fatty pancreas  PNA (pneumonia)  Pulmonary HTN  IGT (impaired glucose tolerance)  Ulcerative colitis  Acid reflux  Anxiety  Depression  Mouth sores  HLD (hyperlipidemia)  Asthma  Humeral head fracture  H/O: hysterectomy  H/O cataract extraction, left  History of knee replacement      FAMILY HISTORY:  Family history of dementia (Grandparent)  Family history of colon cancer (Grandparent)      SOCIAL HISTORY:  The patient is functionally quadriplegic.   She lives with her . S has 2 daughters. No history of tobacco, alcohol or drug use    Allergies    ASA; dye contrast (Anaphylaxis)  aspirin (Short breath)  divalproex sodium (Other (Unknown))  Haldol (Other (Unknown))  penicillin (Short breath; Rash)  sulfa drugs (Short breath; Rash)  vancomycin (Rash; Urticaria; Hives)  Xanax (Other (Unknown))    Intolerances        HOME MEDICATIONS:  Home Medications:  acetaminophen 160 mg/5 mL oral suspension: milliliter(s) by gastrostomy tube , As Needed - 3)  (24 Mar 2019 22:12)  budesonide-formoterol 160 mcg-4.5 mcg/inh inhalation aerosol: 2 puff(s) inhaled 2 times a day (24 Mar 2019 22:12)  clonazePAM 0.5 mg oral tablet: 3 tab(s) orally once a day (at bedtime) (24 Mar 2019 22:12)  ergocalciferol: 1 milliliter(s) by gastrostomy tube once a day (24 Mar 2019 22:12)  freetext medication  - controlled substance: 1 milliliter(s) orally  (24 Mar 2019 22:12)  gabapentin 250 mg/5 mL oral solution: 6 milliliter(s) orally 2 times a day (24 Mar 2019 22:12)  ipratropium-albuterol 0.5 mg-2.5 mg/3 mLinhalation solution: 3 milliliter(s) inhaled every 6 hours (24 Mar 2019 22:12)  metFORMIN 500 mg oral tablet: 1 tab(s) by gastrostomy tube once a day (at bedtime) (24 Mar 2019 22:12)  Pepcid 20 mg oral tablet: orally once a day (24 Mar 2019 22:12)  petrolatum topical ointment: 1 application topically 3 times a day (24 Mar 2019 22:12)  predniSONE 2.5 mg oral tablet: 3 tab(s) orally once a day (24 Mar 2019 22:12)  QUEtiapine 25 mg oral tablet: orally once a day (at bedtime) (24 Mar 2019 22:12)      REVIEW OF SYSTEMS:  [*] Unable to assess ROS due to clinical status    OBJECTIVE:  ICU Vital Signs Last 24 Hrs  T(C): 39.2 (25 Mar 2019 06:00), Max: 39.2 (25 Mar 2019 06:00)  T(F): 102.6 (25 Mar 2019 06:00), Max: 102.6 (25 Mar 2019 06:00)  HR: 103 (25 Mar 2019 11:03) (103 - 115)  BP: 101/49 (25 Mar 2019 11:03) (85/47 - 115/55)  BP(mean): 70 (25 Mar 2019 06:00) (70 - 71)  ABP: --  ABP(mean): --  RR: 22 (25 Mar 2019 06:00) (14 - 24)  SpO2: 98% (25 Mar 2019 11:03) (94% - 100%)        CAPILLARY BLOOD GLUCOSE          PHYSICAL EXAM:  VS: BP , HR , RR 18, SpO2 100% while breathing ambient air  GENERAL: NAD, contracted extremities  HEAD:  Atraumatic, Normocephalic  EYES: EOMI, conjunctiva and sclera clear  NECK: Supple, No JVD  CHEST/LUNG: LLL fine crackles.   HEART: Regular rate and rhythm; 2/6 systolic murmur   ABDOMEN: Soft, Nontender, Nondistended; Bowel sounds present.  PEG tube in place with erythema or exudates   EXTREMITIES:  2+ Peripheral Pulses, No clubbing, cyanosis, or edema. R hip wound with dressing  PSYCH: AAOx0  NEUROLOGY: non-focal  SKIN: Stage 4 sacral ulcer with dressing     LINES:     HOSPITAL MEDICATIONS:  Standing Meds:  ALBUTerol    90 MICROgram(s) HFA Inhaler 1 Puff(s) Inhalation every 4 hours  ALBUTerol/ipratropium for Nebulization 3 milliLiter(s) Nebulizer every 6 hours  apixaban 5 milliGRAM(s) Oral every 12 hours  buDESOnide 160 MICROgram(s)/formoterol 4.5 MICROgram(s) Inhaler 2 Puff(s) Inhalation two times a day  ergocalciferol 67599 Unit(s) Oral every week  famotidine    Tablet 20 milliGRAM(s) Oral daily  ferrous    sulfate Liquid 300 milliGRAM(s) Enteral Tube daily  fluconAZOLE IVPB      gabapentin   Solution 300 milliGRAM(s) Oral two times a day  hydrocortisone sodium succinate Injectable 100 milliGRAM(s) IV Push every 8 hours  meropenem  IVPB 1000 milliGRAM(s) IV Intermittent every 8 hours  midodrine 10 milliGRAM(s) Oral every 8 hours  petrolatum white Ointment 1 Application(s) Topical three times a day  sodium chloride 0.9% Bolus 250 milliLiter(s) IV Bolus every 8 hours  sodium chloride 0.9% Bolus 500 milliLiter(s) IV Bolus once  tiotropium 18 MICROgram(s) Capsule 1 Capsule(s) Inhalation daily  vancomycin  IVPB 1000 milliGRAM(s) IV Intermittent every 24 hours      PRN Meds:  acetaminophen    Suspension .. 650 milliGRAM(s) Oral every 6 hours PRN  clonazePAM Tablet 0.5 milliGRAM(s) Oral three times a day PRN  diphenhydrAMINE 25 milliGRAM(s) Oral every 6 hours PRN  diphenhydrAMINE   Injectable 50 milliGRAM(s) IV Push once PRN  nystatin Powder 1 Application(s) Topical two times a day PRN      LABS:                        7.3    17.89 )-----------( 547      ( 25 Mar 2019 09:03 )             23.9     Hgb Trend: 7.3<--, 9.1<--      132<L>  |  96  |  36<H>  ----------------------------<  132<H>  4.3   |  22  |  0.46<L>    Ca    8.8      25 Mar 2019 06:00    TPro  5.2<L>  /  Alb  2.5<L>  /  TBili  0.3  /  DBili  x   /  AST  17  /  ALT  10  /  AlkPhos  97      Creatinine Trend: 0.46<--, 0.40<--, 0.32<--, 0.31<--, 0.32<--, 0.34<--    Urinalysis Basic - ( 24 Mar 2019 21:06 )    Color: Yellow / Appearance: Slightly Turbid / S.017 / pH: x  Gluc: x / Ketone: Negative  / Bili: Negative / Urobili: Negative   Blood: x / Protein: 30 mg/dL / Nitrite: Negative   Leuk Esterase: Large / RBC: 41 /hpf /  /HPF   Sq Epi: x / Non Sq Epi: 0 /hpf / Bacteria: Negative        Venous Blood Gas:   @ 18:05  7.42/45/31/28/46  VBG Lactate: 1.8      MICROBIOLOGY:       RADIOLOGY:  [ ] Reviewed and interpreted by me    EKG:

## 2019-03-25 NOTE — ED ADULT NURSE REASSESSMENT NOTE - NS ED NURSE REASSESS COMMENT FT1
pt remains nonverbal but did open her eyes, during pericare that was performed for pt having loose, liquid BM.  sample sent to lab by previous RN for eval.  sacral wound dsg changed and recovered with dry duoderm dsg.  pt reeval by MICU team, whom rejected MICU admit at this time.  they will reassess.  iv abx continue to infuse, per md's orders.  will continue to monitor. pt remains nonverbal but did open her eyes, during pericare that was performed for pt having loose, liquid BM.  sample sent to lab by previous RN for eval.  sacral wound dsg changed and recovered with dry duoderm dsg.  pt reeval by MICU team, whom rejected MICU admit at this time.  they will reassess.  cooling blanket that was applied was removed for rectal temp WNL.  iv abx continue to infuse, per md's orders.  will continue to monitor. pt remains nonverbal but did open her eyes, during pericare that was performed for pt having loose, liquid BM.  sample sent to lab by previous RN for eval.  sacral wound dsg changed and recovered with dry duoderm dsg.  pt reeval by MICU team, whom rejected MICU admit at this time.  they will reassess.  cooling blanket that was applied was removed for rectal temp WNL.  iv abx continue to infuse, per md's orders.  at 1230, pt also recvd medical marijuana dose administered by spouse, per md's orders.  will continue to monitor.

## 2019-03-26 LAB
ANION GAP SERPL CALC-SCNC: 14 MMOL/L — SIGNIFICANT CHANGE UP (ref 5–17)
BUN SERPL-MCNC: 28 MG/DL — HIGH (ref 7–23)
CALCIUM SERPL-MCNC: 9.1 MG/DL — SIGNIFICANT CHANGE UP (ref 8.4–10.5)
CHLORIDE SERPL-SCNC: 99 MMOL/L — SIGNIFICANT CHANGE UP (ref 96–108)
CO2 SERPL-SCNC: 21 MMOL/L — LOW (ref 22–31)
CREAT SERPL-MCNC: 0.37 MG/DL — LOW (ref 0.5–1.3)
CULTURE RESULTS: SIGNIFICANT CHANGE UP
GLUCOSE SERPL-MCNC: 146 MG/DL — HIGH (ref 70–99)
HCT VFR BLD CALC: 22 % — LOW (ref 34.5–45)
HGB BLD-MCNC: 7.4 G/DL — LOW (ref 11.5–15.5)
MCHC RBC-ENTMCNC: 29.9 PG — SIGNIFICANT CHANGE UP (ref 27–34)
MCHC RBC-ENTMCNC: 33.4 GM/DL — SIGNIFICANT CHANGE UP (ref 32–36)
MCV RBC AUTO: 89.6 FL — SIGNIFICANT CHANGE UP (ref 80–100)
PLATELET # BLD AUTO: 483 K/UL — HIGH (ref 150–400)
POTASSIUM SERPL-MCNC: 4 MMOL/L — SIGNIFICANT CHANGE UP (ref 3.5–5.3)
POTASSIUM SERPL-SCNC: 4 MMOL/L — SIGNIFICANT CHANGE UP (ref 3.5–5.3)
RBC # BLD: 2.46 M/UL — LOW (ref 3.8–5.2)
RBC # FLD: 15.2 % — HIGH (ref 10.3–14.5)
SODIUM SERPL-SCNC: 134 MMOL/L — LOW (ref 135–145)
SPECIMEN SOURCE: SIGNIFICANT CHANGE UP
WBC # BLD: 14.5 K/UL — HIGH (ref 3.8–10.5)
WBC # FLD AUTO: 14.5 K/UL — HIGH (ref 3.8–10.5)

## 2019-03-26 PROCEDURE — 99232 SBSQ HOSP IP/OBS MODERATE 35: CPT

## 2019-03-26 RX ORDER — DIPHENHYDRAMINE HCL 50 MG
25 CAPSULE ORAL EVERY 6 HOURS
Qty: 0 | Refills: 0 | Status: DISCONTINUED | OUTPATIENT
Start: 2019-03-26 | End: 2019-05-06

## 2019-03-26 RX ORDER — LISINOPRIL 2.5 MG/1
5 TABLET ORAL DAILY
Qty: 0 | Refills: 0 | Status: DISCONTINUED | OUTPATIENT
Start: 2019-03-26 | End: 2019-03-30

## 2019-03-26 RX ORDER — QUETIAPINE FUMARATE 200 MG/1
25 TABLET, FILM COATED ORAL DAILY
Qty: 0 | Refills: 0 | Status: DISCONTINUED | OUTPATIENT
Start: 2019-03-26 | End: 2019-03-27

## 2019-03-26 RX ORDER — ASCORBIC ACID 60 MG
500 TABLET,CHEWABLE ORAL DAILY
Qty: 0 | Refills: 0 | Status: DISCONTINUED | OUTPATIENT
Start: 2019-03-26 | End: 2019-05-06

## 2019-03-26 RX ORDER — QUETIAPINE FUMARATE 200 MG/1
25 TABLET, FILM COATED ORAL
Qty: 0 | Refills: 0 | Status: DISCONTINUED | OUTPATIENT
Start: 2019-03-26 | End: 2019-03-26

## 2019-03-26 RX ORDER — CLONAZEPAM 1 MG
1.5 TABLET ORAL
Qty: 0 | Refills: 0 | Status: DISCONTINUED | OUTPATIENT
Start: 2019-03-26 | End: 2019-04-02

## 2019-03-26 RX ORDER — PSYLLIUM SEED (WITH DEXTROSE)
1 POWDER (GRAM) ORAL DAILY
Qty: 0 | Refills: 0 | Status: DISCONTINUED | OUTPATIENT
Start: 2019-03-26 | End: 2019-03-28

## 2019-03-26 RX ORDER — SODIUM HYPOCHLORITE 0.125 %
1 SOLUTION, NON-ORAL MISCELLANEOUS DAILY
Qty: 0 | Refills: 0 | Status: DISCONTINUED | OUTPATIENT
Start: 2019-03-26 | End: 2019-04-04

## 2019-03-26 RX ADMIN — APIXABAN 5 MILLIGRAM(S): 2.5 TABLET, FILM COATED ORAL at 08:59

## 2019-03-26 RX ADMIN — Medication 3 MILLILITER(S): at 06:24

## 2019-03-26 RX ADMIN — Medication 1 APPLICATION(S): at 16:37

## 2019-03-26 RX ADMIN — Medication 1 PACKET(S): at 22:33

## 2019-03-26 RX ADMIN — LISINOPRIL 5 MILLIGRAM(S): 2.5 TABLET ORAL at 09:05

## 2019-03-26 RX ADMIN — MEROPENEM 100 MILLIGRAM(S): 1 INJECTION INTRAVENOUS at 06:48

## 2019-03-26 RX ADMIN — MIDODRINE HYDROCHLORIDE 10 MILLIGRAM(S): 2.5 TABLET ORAL at 06:49

## 2019-03-26 RX ADMIN — APIXABAN 5 MILLIGRAM(S): 2.5 TABLET, FILM COATED ORAL at 20:41

## 2019-03-26 RX ADMIN — Medication 3 MILLILITER(S): at 23:25

## 2019-03-26 RX ADMIN — Medication 1 TABLET(S): at 14:11

## 2019-03-26 RX ADMIN — MIDODRINE HYDROCHLORIDE 10 MILLIGRAM(S): 2.5 TABLET ORAL at 14:09

## 2019-03-26 RX ADMIN — Medication 25 MILLIGRAM(S): at 01:08

## 2019-03-26 RX ADMIN — Medication 100 MILLIGRAM(S): at 22:33

## 2019-03-26 RX ADMIN — FAMOTIDINE 20 MILLIGRAM(S): 10 INJECTION INTRAVENOUS at 12:53

## 2019-03-26 RX ADMIN — QUETIAPINE FUMARATE 25 MILLIGRAM(S): 200 TABLET, FILM COATED ORAL at 23:24

## 2019-03-26 RX ADMIN — Medication 1 APPLICATION(S): at 06:19

## 2019-03-26 RX ADMIN — Medication 500 MILLIGRAM(S): at 14:08

## 2019-03-26 RX ADMIN — Medication 3 MILLILITER(S): at 00:04

## 2019-03-26 RX ADMIN — FLUCONAZOLE 100 MILLIGRAM(S): 150 TABLET ORAL at 06:48

## 2019-03-26 RX ADMIN — Medication 3 MILLILITER(S): at 18:01

## 2019-03-26 RX ADMIN — GABAPENTIN 300 MILLIGRAM(S): 400 CAPSULE ORAL at 22:33

## 2019-03-26 RX ADMIN — Medication 100 MILLIGRAM(S): at 14:09

## 2019-03-26 RX ADMIN — BUDESONIDE AND FORMOTEROL FUMARATE DIHYDRATE 2 PUFF(S): 160; 4.5 AEROSOL RESPIRATORY (INHALATION) at 09:00

## 2019-03-26 RX ADMIN — Medication 100 MILLIGRAM(S): at 06:23

## 2019-03-26 RX ADMIN — GABAPENTIN 300 MILLIGRAM(S): 400 CAPSULE ORAL at 10:05

## 2019-03-26 RX ADMIN — Medication 1 APPLICATION(S): at 23:50

## 2019-03-26 RX ADMIN — Medication 25 MILLIGRAM(S): at 10:06

## 2019-03-26 RX ADMIN — Medication 3 MILLILITER(S): at 12:53

## 2019-03-26 RX ADMIN — Medication 0.5 MILLIGRAM(S): at 00:04

## 2019-03-26 RX ADMIN — Medication 1.5 MILLIGRAM(S): at 22:33

## 2019-03-26 RX ADMIN — Medication 250 MILLIGRAM(S): at 10:06

## 2019-03-26 RX ADMIN — Medication 300 MILLIGRAM(S): at 09:00

## 2019-03-26 RX ADMIN — BUDESONIDE AND FORMOTEROL FUMARATE DIHYDRATE 2 PUFF(S): 160; 4.5 AEROSOL RESPIRATORY (INHALATION) at 20:42

## 2019-03-26 NOTE — DIETITIAN INITIAL EVALUATION ADULT. - PROBLEM SELECTOR PLAN 8
Pt has asthma and is on chronic steroids.  Pt start duonebs q4h standing for now, due to mild respiratory distress  Continue home dose of prednisone 7.5mg PO qd  Continue tiotropium 18mcg qd

## 2019-03-26 NOTE — CHART NOTE - NSCHARTNOTEFT_GEN_A_CORE
Informed by RN, pt on medical marijuana administered by her  at bedside. Paperwork given to  to fill out for agreement and waiver form as per hospital policy by RN staff and RN . Pt admitted on 3/24/19, H & P noted pt on medical marijuana but order pending.  Pt is a 68yoF with PMH of advanced dementia (nonverbal, dysphagia with PEG tube, functional quadriplegic, chronic gavin catheter), sacral stage 4 pressure ulcer, heel pressure ulcers, asthma on chronic prednisone., hLd, CVA, UC (in remission for 30years), right prosthetic hip MRSA infection in 7/2018 s/p spacer placement, HFrEF, elevated pulmonary pressure who presents to the hospital for fevers, found to meet SIRS criteria. Pt with + yeast in urine culture on Diflucan I.V., pt with trending down WBC, ID following.      and daughters had provided thorough list of medications, added Clonazepam will hold Seroquel as per admitting Hospitalist orders and Tizanidine  - Called covering Hospitalist In charge, Dr. JEFF Bryant for Medical Marijuana order, informed order cannot be placed by her but need Palliative consult.  Notified  at bedside, will notify day team during sign out.    Eduard Kuo, BENJI  [id number]

## 2019-03-26 NOTE — PROGRESS NOTE ADULT - SUBJECTIVE AND OBJECTIVE BOX
infectious diseases progress note:    Patient is a 68y old  Female who presents with a chief complaint of fevers (26 Mar 2019 07:00)        Fever           Allergies    ASA; dye contrast (Anaphylaxis)  aspirin (Short breath)  divalproex sodium (Other (Unknown))  Haldol (Other (Unknown))  penicillin (Short breath; Rash)  sulfa drugs (Short breath; Rash)  vancomycin (Rash; Urticaria; Hives)  Xanax (Other (Unknown))    Intolerances        ANTIBIOTICS/RELEVANT:  antimicrobials  fluconAZOLE IVPB      fluconAZOLE IVPB 200 milliGRAM(s) IV Intermittent every 24 hours  meropenem  IVPB 1000 milliGRAM(s) IV Intermittent every 8 hours  vancomycin  IVPB 1000 milliGRAM(s) IV Intermittent every 24 hours    immunologic:    OTHER:  acetaminophen    Suspension .. 650 milliGRAM(s) Oral every 6 hours PRN  ALBUTerol    90 MICROgram(s) HFA Inhaler 1 Puff(s) Inhalation every 4 hours  ALBUTerol/ipratropium for Nebulization 3 milliLiter(s) Nebulizer every 6 hours  apixaban 5 milliGRAM(s) Oral every 12 hours  buDESOnide 160 MICROgram(s)/formoterol 4.5 MICROgram(s) Inhaler 2 Puff(s) Inhalation two times a day  clonazePAM Tablet 1.5 milliGRAM(s) Oral <User Schedule>  diphenhydrAMINE   Elixir 25 milliGRAM(s) Oral every 6 hours PRN  diphenhydrAMINE   Injectable 50 milliGRAM(s) IV Push once PRN  ergocalciferol 61080 Unit(s) Oral every week  famotidine    Tablet 20 milliGRAM(s) Oral daily  ferrous    sulfate Liquid 300 milliGRAM(s) Enteral Tube daily  gabapentin   Solution 300 milliGRAM(s) Oral two times a day  hydrocortisone sodium succinate Injectable 100 milliGRAM(s) IV Push every 8 hours  lisinopril 5 milliGRAM(s) Oral daily  midodrine 10 milliGRAM(s) Oral every 8 hours  nystatin Powder 1 Application(s) Topical two times a day PRN  petrolatum white Ointment 1 Application(s) Topical three times a day  QUEtiapine 25 milliGRAM(s) Oral daily PRN  sodium chloride 0.9% Bolus 500 milliLiter(s) IV Bolus once  tiotropium 18 MICROgram(s) Capsule 1 Capsule(s) Inhalation daily      Objective:  Vital Signs Last 24 Hrs  T(C): 36.3 (26 Mar 2019 04:41), Max: 37.5 (25 Mar 2019 12:30)  T(F): 97.4 (26 Mar 2019 04:41), Max: 99.5 (25 Mar 2019 12:30)  HR: 98 (26 Mar 2019 04:41) (93 - 107)  BP: 103/55 (26 Mar 2019 04:41) (92/57 - 115/55)  BP(mean): --  RR: 18 (26 Mar 2019 04:41) (18 - 20)  SpO2: 100% (26 Mar 2019 04:41) (96% - 100%)       Eyes:JACQUELIN, EOMI  Ear/Nose/Throat: no oral lesion, no sinus tenderness on percussion	  Neck:no JVD, no lymphadenopathy, supple  Respiratory: CTA maryjane  Cardiovascular: S1S2 RRR, no murmurs  Gastrointestinal:soft, (+) BS, no HSM  Extremities:no e/e/c        LABS:                        7.4    14.5  )-----------( 483      ( 26 Mar 2019 06:39 )             22.0     03-    134<L>  |  99  |  28<H>  ----------------------------<  146<H>  4.0   |  21<L>  |  0.37<L>    Ca    9.1      26 Mar 2019 06:39    TPro  5.2<L>  /  Alb  2.5<L>  /  TBili  0.3  /  DBili  x   /  AST  17  /  ALT  10  /  AlkPhos  97  03-25      Urinalysis Basic - ( 24 Mar 2019 21:06 )    Color: Yellow / Appearance: Slightly Turbid / S.017 / pH: x  Gluc: x / Ketone: Negative  / Bili: Negative / Urobili: Negative   Blood: x / Protein: 30 mg/dL / Nitrite: Negative   Leuk Esterase: Large / RBC: 41 /hpf /  /HPF   Sq Epi: x / Non Sq Epi: 0 /hpf / Bacteria: Negative          MICROBIOLOGY:    RECENT CULTURES:   @ 13:16 .Stool Feces                GI PCR Results: NOT detected  *******Please Note:*******  GI panel PCR evaluates for:  Campylobacter, Plesiomonas shigelloides, Salmonella,  Vibrio, Yersinia enterocolitica, Enteroaggregative  Escherichia coli (EAEC), Enteropathogenic E.coli (EPEC),  Enterotoxigenic E. coli (ETEC) lt/st, Shiga-like  toxin-producing E. coli (STEC) stx1/stx2,  Shigella/ Enteroinvasive E. coli (EIEC), Cryptosporidium,  Cyclospora cayetanensis, Entamoeba histolytica,  Giardia lamblia, Adenovirus F 40/41, Astrovirus,  Norovirus GI/GII, Rotavirus A, Sapovirus     @ 00:14 .Urine Catheterized                10,000 - 49,000 CFU/mL Yeast-like cells, presumptively not Candida  albicans     @ 23:42 .Blood Blood-Peripheral                No growth to date.     @ 23:40 .Blood Blood-Venous                No growth to date.          RESPIRATORY CULTURES:      Clostridium difficile [facility], EIA:   Negative ( @ 07:49)          RADIOLOGY & ADDITIONAL STUDIES:        Pager [phone]  After 5 pm/weekends or if no response :[phone]

## 2019-03-26 NOTE — PROGRESS NOTE ADULT - SUBJECTIVE AND OBJECTIVE BOX
---___---___---___---___---___---___ ---___---___---___---___---___---___---___---___---___---                    <<<  M E D I C A L   A T T E N D I N G    F O L L O W    U P   N O T E  >>>    - Patient seen and examined by me approximately thirty minutes ago.  - In summary, patient is a 68y year old Female who origianlly presented with fever and wbc of [id number] now was on vanco  and feelin better   - Patient today overall doing ok, comfortable, eating OK.     ---___---___---___---___---___---      <<<  MEDICATIONS:  >>>    MEDICATIONS  (STANDING):  ALBUTerol    90 MICROgram(s) HFA Inhaler 1 Puff(s) Inhalation every 4 hours  ALBUTerol/ipratropium for Nebulization 3 milliLiter(s) Nebulizer every 6 hours  apixaban 5 milliGRAM(s) Oral every 12 hours  buDESOnide 160 MICROgram(s)/formoterol 4.5 MICROgram(s) Inhaler 2 Puff(s) Inhalation two times a day  clonazePAM Tablet 1.5 milliGRAM(s) Oral <User Schedule>  ergocalciferol 74161 Unit(s) Oral every week  famotidine    Tablet 20 milliGRAM(s) Oral daily  ferrous    sulfate Liquid 300 milliGRAM(s) Enteral Tube daily  fluconAZOLE IVPB      fluconAZOLE IVPB 200 milliGRAM(s) IV Intermittent every 24 hours  gabapentin   Solution 300 milliGRAM(s) Oral two times a day  hydrocortisone sodium succinate Injectable 100 milliGRAM(s) IV Push every 8 hours  lisinopril 5 milliGRAM(s) Oral daily  meropenem  IVPB 1000 milliGRAM(s) IV Intermittent every 8 hours  midodrine 10 milliGRAM(s) Oral every 8 hours  petrolatum white Ointment 1 Application(s) Topical three times a day  sodium chloride 0.9% Bolus 500 milliLiter(s) IV Bolus once  tiotropium 18 MICROgram(s) Capsule 1 Capsule(s) Inhalation daily  vancomycin  IVPB 1000 milliGRAM(s) IV Intermittent every 24 hours      MEDICATIONS  (PRN):  acetaminophen    Suspension .. 650 milliGRAM(s) Oral every 6 hours PRN Temp greater or equal to 38C (100.4F), Mild Pain (1 - 3), Moderate Pain (4 - 6)  diphenhydrAMINE   Elixir 25 milliGRAM(s) Oral every 6 hours PRN Rash and/or Itching  diphenhydrAMINE   Injectable 50 milliGRAM(s) IV Push once PRN Allergy symptoms  nystatin Powder 1 Application(s) Topical two times a day PRN rash/itchiness  QUEtiapine 25 milliGRAM(s) Oral daily PRN agitation       ---___---___---___---___---___---     <<<REVIEW OF SYSTEM: >>>  unable to obtain      ---___---___---___---___---___---          <<<  VITAL SIGNS: >>>    T(F): 97.4 (19 @ 04:41), Max: 99.5 (19 @ 12:30)  HR: 98 (19 @ 04:41) (93 - 109)  BP: 103/55 (19 @ 04:41) (85/47 - 115/55)  RR: 18 (19 @ 04:41) (18 - 20)  SpO2: 100% (19 @ 04:41) (94% - 100%)  Wt(kg): --  CAPILLARY BLOOD GLUCOSE      POCT Blood Glucose.: 101 mg/dL (25 Mar 2019 22:06)    I&O's Summary    25 Mar 2019 07:01  -  26 Mar 2019 07:00  --------------------------------------------------------  IN: 0 mL / OUT: 400 mL / NET: -400 mL         ---___---___---___---___---___---                       PHYSICAL EXAM:    GEN: A&O X 0 , NAD , comfortable  HEENT: NCAT, PERRL, MMM, no scleral icterus, hearing intact  NECK: Supple, No JVD  CVS: S1S2 , regular , No M/R/G appreciated  PULM: CTA B/L,  no W/R/R appreciated  ABD.: soft. non tender, non distended,  bowel sounds present  Extrem: intact pulses , no edema noted  Derm: No rash or ecchymosis noted sacral decubitus noted         ---___---___---___---___---___---     <<<  LAB AND IMAGING: >>>                          7.4    14.5  )-----------( 483      ( 26 Mar 2019 06:39 )             22.0               03-25    132<L>  |  96  |  36<H>  ----------------------------<  132<H>  4.3   |  22  |  0.46<L>    Ca    8.8      25 Mar 2019 06:00    TPro  5.2<L>  /  Alb  2.5<L>  /  TBili  0.3  /  DBili  x   /  AST  17  /  ALT  10  /  AlkPhos  97  03-25           Urinalysis Basic - ( 24 Mar 2019 21:06 )    Color: Yellow / Appearance: Slightly Turbid / S.017 / pH: x  Gluc: x / Ketone: Negative  / Bili: Negative / Urobili: Negative   Blood: x / Protein: 30 mg/dL / Nitrite: Negative   Leuk Esterase: Large / RBC: 41 /hpf /  /HPF   Sq Epi: x / Non Sq Epi: 0 /hpf / Bacteria: Negative                        [All pertinent / recent available Imaging reports and other labs reviewed]     ---___---___---___---___---___---___ ---___---___---___---___---           <<<  A S S E S S M E N T   A N D   P L A N :  >>>    sepsis continue iv abx ID Dr carrizales following   sacral decubitus  continue wound care   dementia supportive care   asthma  continue albuterol andudesonide   h/o dvt  on eliquis   agitation  on klonopin ad seroquel and gabapentin  chronic pain from h/o lumbar fracture on medical marijuana   h/o chf contiue lisinopril   spoke at length that the recurrent fever may be from th espacer. it was discussed if surgery would be an option the  refused stating "she would not recover from the surgery "  Patient is also a FULL CODE      -GI/DVT Prophylaxis.    --------------------------------------------  Case discussed with   Education given on     >>______________________<<      Deniz Bolden .         phone   [phone]

## 2019-03-26 NOTE — DIETITIAN INITIAL EVALUATION ADULT. - PROBLEM SELECTOR PLAN 1
Daughters at bedside reaffirm that they and the patient's  do not think the patient would be able to tolerate further invasive testing to assess for persistent R prosthetic hip infection.  Consulted infectious disease Dr. Powell's team to discuss antibiotic coverage, and spoke with [facility] center, awaiting callback.   Will continue to treat empirically with meropenem.  Hold minocycline

## 2019-03-26 NOTE — DIETITIAN INITIAL EVALUATION ADULT. - PROBLEM SELECTOR PLAN 5
Patient is functionally quadriplegic. and requires turning.  Given extent of patient's debility, recurrent hospitalizations, palliative evaluation and goals of care discussion may be helpful for patient's family to understand prognosis.

## 2019-03-26 NOTE — DIETITIAN INITIAL EVALUATION ADULT. - PROBLEM SELECTOR PLAN 2
Meets SIRS criteria based on fever and tachycardia Leukocytosis with neutrophillia suggestive of likely infectious cause. Check procalcitonin.   Blood cultures, urine cultures obtained.  Will obtain sputum culture.    Obtain CT chest  to evaluate for potential PNA.  Pt is due for gavin change later this week.   Monitor VS q4h.  Start tylenol PRN for fever  During last admission, pt deferred on bone marrow biopsy proposed by hematology team to assess for myeloproliferative causes of fever.   Pt had indum -111 labeled leukocyte study which did not identify source of infection on 3/12/2019  Pt had recent CT a/p during last admission which recommended MRI to further assess known sacral ulcer. Obtain MRI eval for sacral decubitus ulcer to assess for osteomyelitis, abscess, soft tissue infection.  Family reports pt has had very loose stools. Given chronically on antibx, will check GI PCR and stool C diff.  Will start cooling blanket.   If pt develops hypotension, may require stress dose steroids.  UA found few budding yeast like cells with WBCs. Patient has chronic gavin catheter and this may represent colonization of the bladder, vs candida infection. Will treat with fluconazole for now.

## 2019-03-26 NOTE — DIETITIAN INITIAL EVALUATION ADULT. - PROBLEM SELECTOR PLAN 4
Will request wound care consult.  Patient will need regularly scheduled turning to offload joints  Will order MRI to assess for potential bony involvement

## 2019-03-26 NOTE — DIETITIAN INITIAL EVALUATION ADULT. - PERTINENT MEDS FT
acetaminophen    Suspension .. 650 PRN  ALBUTerol    90 MICROgram(s) HFA Inhaler 1  ALBUTerol/ipratropium for Nebulization 3  apixaban 5  buDESOnide 160 MICROgram(s)/formoterol 4.5 MICROgram(s) Inhaler 2  clonazePAM Tablet 1.5  diphenhydrAMINE   Elixir 25 PRN  diphenhydrAMINE   Injectable 50 PRN  ergocalciferol 18121  famotidine    Tablet 20  ferrous    sulfate Liquid 300  fluconAZOLE IVPB   fluconAZOLE IVPB 200  gabapentin   Solution 300  hydrocortisone sodium succinate Injectable 100  lisinopril 5  meropenem  IVPB 1000  midodrine 10  nystatin Powder 1 PRN  petrolatum white Ointment 1  QUEtiapine 25 PRN  sodium chloride 0.9% Bolus 500  tiotropium 18 MICROgram(s) Capsule 1  vancomycin  IVPB 1000

## 2019-03-26 NOTE — PROGRESS NOTE ADULT - ASSESSMENT
68 yr old with advanced dementia , bed bound, contracted, cva, sp removal of infected hip due to mrsa with placement of space last fall.   Pt has been havening fevers for months.  I suspect there is ongoing Om of the hip site but changing the spacer is not a realistic option . chronic Om of the sacrum usually does not cause fevers and does not need prolonged ab therapy .       cultures negative to date  uc with yeast of doubtful significance    vanco to cover mrsa  can dc meropenem  after cultures , we cac decide on endpoint of ab therapy   discussed  with   palliative care consult ?  reluctant to rescan

## 2019-03-26 NOTE — DIETITIAN INITIAL EVALUATION ADULT. - NS AS NUTRI INTERV ENTERAL NUTRITION
Glucerna 1.5 (4 cans daily) provides 1424calories/78grams protein/720g/mlwater) 34kcal/kg and 1.8grams protein/kg. based on IBW of 42kg. Glucerna 1.5 (4 cans daily) provides 1424calories/78grams protein/720g/mlwater) 34kcal/kg and 1.8grams protein/kg. based on IBW of 42kg. provide additional 300ml free water daily.

## 2019-03-26 NOTE — DIETITIAN INITIAL EVALUATION ADULT. - PROBLEM SELECTOR PLAN 6
Patient has advanced dementia.   Continue with home dose of gabapentin and clonazepam (with hold parameters), Hold quetiapine for now.   medical marijuana   - fall precautions  - provide frequent reorientation  - pt does not follow commands A&O x0  - Start home dose of tube feeds.

## 2019-03-26 NOTE — DIETITIAN INITIAL EVALUATION ADULT. - OTHER INFO
seen for consult for tube feeding, calorie count, enteral/parenteral nutrition PTA, pressure injury stage 2 or greater. Calorie Count not indicated as pt is tube fed. admitted for fevers, receiving bolus feeds 1 can Glucerna 1.2 (4 x daily) (8:30, 12:30, 16:30, 20:30), denies nausea/vomit, last BM 3/25-loose and brown. NKFA. IBW +/- 10% (92pounds/42kg). Banantrol is not effective to control pt BM'S. pt does not tolerate Iron.

## 2019-03-27 LAB
BASOPHILS # BLD AUTO: 0 K/UL — SIGNIFICANT CHANGE UP (ref 0–0.2)
BASOPHILS NFR BLD AUTO: 0.1 % — SIGNIFICANT CHANGE UP (ref 0–2)
EOSINOPHIL # BLD AUTO: 0 K/UL — SIGNIFICANT CHANGE UP (ref 0–0.5)
EOSINOPHIL NFR BLD AUTO: 0.2 % — SIGNIFICANT CHANGE UP (ref 0–6)
HCT VFR BLD CALC: 23.9 % — LOW (ref 34.5–45)
HGB BLD-MCNC: 8 G/DL — LOW (ref 11.5–15.5)
LYMPHOCYTES # BLD AUTO: 0.4 K/UL — LOW (ref 1–3.3)
LYMPHOCYTES # BLD AUTO: 2.8 % — LOW (ref 13–44)
MCHC RBC-ENTMCNC: 31.3 PG — SIGNIFICANT CHANGE UP (ref 27–34)
MCHC RBC-ENTMCNC: 33.3 GM/DL — SIGNIFICANT CHANGE UP (ref 32–36)
MCV RBC AUTO: 94 FL — SIGNIFICANT CHANGE UP (ref 80–100)
MONOCYTES # BLD AUTO: 0.7 K/UL — SIGNIFICANT CHANGE UP (ref 0–0.9)
MONOCYTES NFR BLD AUTO: 5 % — SIGNIFICANT CHANGE UP (ref 2–14)
NEUTROPHILS # BLD AUTO: 12.8 K/UL — HIGH (ref 1.8–7.4)
NEUTROPHILS NFR BLD AUTO: 91.8 % — HIGH (ref 43–77)
PLATELET # BLD AUTO: 453 K/UL — HIGH (ref 150–400)
RBC # BLD: 2.54 M/UL — LOW (ref 3.8–5.2)
RBC # FLD: 15.1 % — HIGH (ref 10.3–14.5)
WBC # BLD: 14 K/UL — HIGH (ref 3.8–10.5)
WBC # FLD AUTO: 14 K/UL — HIGH (ref 3.8–10.5)

## 2019-03-27 PROCEDURE — 71045 X-RAY EXAM CHEST 1 VIEW: CPT | Mod: 26

## 2019-03-27 PROCEDURE — 99232 SBSQ HOSP IP/OBS MODERATE 35: CPT

## 2019-03-27 RX ORDER — QUETIAPINE FUMARATE 200 MG/1
25 TABLET, FILM COATED ORAL AT BEDTIME
Qty: 0 | Refills: 0 | Status: DISCONTINUED | OUTPATIENT
Start: 2019-03-27 | End: 2019-05-06

## 2019-03-27 RX ADMIN — BUDESONIDE AND FORMOTEROL FUMARATE DIHYDRATE 2 PUFF(S): 160; 4.5 AEROSOL RESPIRATORY (INHALATION) at 21:00

## 2019-03-27 RX ADMIN — Medication 100 MILLIGRAM(S): at 05:07

## 2019-03-27 RX ADMIN — Medication 25 MILLIGRAM(S): at 09:10

## 2019-03-27 RX ADMIN — Medication 100 MILLIGRAM(S): at 13:10

## 2019-03-27 RX ADMIN — Medication 1.5 MILLIGRAM(S): at 21:51

## 2019-03-27 RX ADMIN — Medication 3 MILLILITER(S): at 11:37

## 2019-03-27 RX ADMIN — Medication 3 MILLILITER(S): at 05:07

## 2019-03-27 RX ADMIN — GABAPENTIN 300 MILLIGRAM(S): 400 CAPSULE ORAL at 21:01

## 2019-03-27 RX ADMIN — Medication 250 MILLIGRAM(S): at 10:20

## 2019-03-27 RX ADMIN — Medication 100 MILLIGRAM(S): at 21:51

## 2019-03-27 RX ADMIN — APIXABAN 5 MILLIGRAM(S): 2.5 TABLET, FILM COATED ORAL at 09:09

## 2019-03-27 RX ADMIN — APIXABAN 5 MILLIGRAM(S): 2.5 TABLET, FILM COATED ORAL at 21:00

## 2019-03-27 RX ADMIN — Medication 500 MILLIGRAM(S): at 09:09

## 2019-03-27 RX ADMIN — Medication 1 APPLICATION(S): at 13:12

## 2019-03-27 RX ADMIN — GABAPENTIN 300 MILLIGRAM(S): 400 CAPSULE ORAL at 09:09

## 2019-03-27 RX ADMIN — Medication 3 MILLILITER(S): at 23:08

## 2019-03-27 RX ADMIN — FLUCONAZOLE 100 MILLIGRAM(S): 150 TABLET ORAL at 05:07

## 2019-03-27 RX ADMIN — Medication 1 TABLET(S): at 09:09

## 2019-03-27 RX ADMIN — Medication 300 MILLIGRAM(S): at 09:09

## 2019-03-27 RX ADMIN — FAMOTIDINE 20 MILLIGRAM(S): 10 INJECTION INTRAVENOUS at 09:09

## 2019-03-27 RX ADMIN — Medication 1 APPLICATION(S): at 22:00

## 2019-03-27 RX ADMIN — Medication 3 MILLILITER(S): at 16:33

## 2019-03-27 RX ADMIN — QUETIAPINE FUMARATE 25 MILLIGRAM(S): 200 TABLET, FILM COATED ORAL at 21:51

## 2019-03-27 RX ADMIN — LISINOPRIL 5 MILLIGRAM(S): 2.5 TABLET ORAL at 05:07

## 2019-03-27 RX ADMIN — BUDESONIDE AND FORMOTEROL FUMARATE DIHYDRATE 2 PUFF(S): 160; 4.5 AEROSOL RESPIRATORY (INHALATION) at 10:19

## 2019-03-27 NOTE — CHART NOTE - NSCHARTNOTEFT_GEN_A_CORE
MEDICINE NP    Notified by RN patient with abnormal breathing. Seen and examined patient at bedside. Patient is alert, NAD. Daughter at bedside reports patient is much better now.     VITAL SIGNS:  Vital Signs Last 24 Hrs  T(C): 36.7 (27 Mar 2019 20:42), Max: 37.2 (27 Mar 2019 12:34)  T(F): 98.1 (27 Mar 2019 20:42), Max: 98.9 (27 Mar 2019 12:34)  HR: 120 (27 Mar 2019 20:42) (98 - 120)  BP: 142/73 (27 Mar 2019 20:42) (112/73 - 148/75)  BP(mean): --  RR: 18 (27 Mar 2019 15:34) (16 - 18)  SpO2: 98% (27 Mar 2019 20:42) (98% - 100%)      LABORATORY:                          8.0    14.0  )-----------( 453      ( 27 Mar 2019 06:37 )             23.9       03-26    134<L>  |  99  |  28<H>  ----------------------------<  146<H>  4.0   |  21<L>  |  0.37<L>    Ca    9.1      26 Mar 2019 06:39        RADIOLOGY:   3/25 CXR: IMPRESSION:   Distended esophagus with ingested material to the level of the stoney.   Small left pleural effusion with adjacent atelectasis.    PHYSICAL EXAM:  Constitutional: NAD. pt is nonverbal  Respiratory: mild wheezing at RLL, LT clear. pt with very poor cough effort per daughter  Cardiovascular: S1 S2. No murmurs.  Gastrointestinal: BS X4 active. soft. nontender.  Extremities/Vascular: +2 pulses bilaterally. No BLE edema.    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 Patient was previously hospitalized from 2/26-3/18/2019 for fevers, found to have septic shock 2/2 UTI vs sacral ulcer vs PNA, requiring pressor support and MICU stay. 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 Offer was made to change the spacer and get cultures, however at that time, the  did not want to put the patient through surgery. Pt completed a course of meropenem, and was resumed on suppressive minocycline. Now p/w wheezing. Oxygenation status WNL and patient not in active distress.     --c/w asthma medication regimen  --duonebs  --CXR  --F/U primary team in     Pat Eldridge, [facility]  [id number] MEDICINE NP    Notified by RN patient with abnormal breathing. Seen and examined patient at bedside. Patient is alert, NAD. Daughter at bedside reports patient is much better now.     VITAL SIGNS:  Vital Signs Last 24 Hrs  T(C): 36.7 (27 Mar 2019 20:42), Max: 37.2 (27 Mar 2019 12:34)  T(F): 98.1 (27 Mar 2019 20:42), Max: 98.9 (27 Mar 2019 12:34)  HR: 120 (27 Mar 2019 20:42) (98 - 120)  BP: 142/73 (27 Mar 2019 20:42) (112/73 - 148/75)  BP(mean): --  RR: 18 (27 Mar 2019 15:34) (16 - 18)  SpO2: 98% (27 Mar 2019 20:42) (98% - 100%)      LABORATORY:                          8.0    14.0  )-----------( 453      ( 27 Mar 2019 06:37 )             23.9       03-26    134<L>  |  99  |  28<H>  ----------------------------<  146<H>  4.0   |  21<L>  |  0.37<L>    Ca    9.1      26 Mar 2019 06:39        RADIOLOGY:   3/25 CXR: IMPRESSION:   Distended esophagus with ingested material to the level of the stoney.   Small left pleural effusion with adjacent atelectasis.    PHYSICAL EXAM:  Constitutional: NAD. pt is nonverbal  Respiratory: mild wheezing at RLL, LT clear. pt with very poor cough effort per daughter  Cardiovascular: S1 S2. No murmurs.  Gastrointestinal: BS X4 active. soft. nontender.  Extremities/Vascular: +2 pulses bilaterally. No BLE edema.    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 Patient was previously hospitalized from 2/26-3/18/2019 for fevers, found to have septic shock 2/2 UTI vs sacral ulcer vs PNA, requiring pressor support and MICU stay. 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 Offer was made to change the spacer and get cultures, however at that time, the  did not want to put the patient through surgery. Pt completed a course of meropenem, and was resumed on suppressive minocycline currently on diflucan and vanco. Now p/w wheezing. Oxygenation status WNL and patient not in active distress.     --c/w asthma medication regimen  --duonebs  --CXR  --F/U primary team in     Pat Eldridge, [facility]  [id number]

## 2019-03-27 NOTE — PROGRESS NOTE ADULT - ASSESSMENT
68 yr old with advanced dementia , bed bound, contracted, cva, sp removal of infected hip due to mrsa with placement of space last fall.   Pt has been havening fevers for months.  I suspect there is ongoing Om of the hip site but changing the spacer is not a realistic option . chronic Om of the sacrum usually does not cause fevers and does not need prolonged ab therapy .       cultures negative to date  uc with yeast of doubtful significance    vanco to cover mrsa  can dc meropenem  after cultures , we cac decide on endpoint of ab therapy   discussed  with      reluctant to rescan   does not want mri  temp is down on vanco    if it stays down , we will dipak to place picc and give her a course  of vanco even though it will  not cure a chronic hip infection

## 2019-03-27 NOTE — PROGRESS NOTE ADULT - SUBJECTIVE AND OBJECTIVE BOX
---___---___---___---___---___---___ ---___---___---___---___---___---___---___---___---___---                    <<<  M E D I C A L   A T T E N D I N G    F O L L O W    U P   N O T E  >>>  fever subsided patient more stable    ---___---___---___---___---___---      <<<  MEDICATIONS:  >>>    MEDICATIONS  (STANDING):  ALBUTerol    90 MICROgram(s) HFA Inhaler 1 Puff(s) Inhalation every 4 hours  ALBUTerol/ipratropium for Nebulization 3 milliLiter(s) Nebulizer every 6 hours  apixaban 5 milliGRAM(s) Oral every 12 hours  ascorbic acid 500 milliGRAM(s) Oral daily  buDESOnide 160 MICROgram(s)/formoterol 4.5 MICROgram(s) Inhaler 2 Puff(s) Inhalation two times a day  clonazePAM Tablet 1.5 milliGRAM(s) Oral <User Schedule>  ergocalciferol 75499 Unit(s) Oral every week  famotidine    Tablet 20 milliGRAM(s) Oral daily  ferrous    sulfate Liquid 300 milliGRAM(s) Enteral Tube daily  fluconAZOLE IVPB      fluconAZOLE IVPB 200 milliGRAM(s) IV Intermittent every 24 hours  gabapentin   Solution 300 milliGRAM(s) Oral two times a day  hydrocortisone sodium succinate Injectable 100 milliGRAM(s) IV Push every 8 hours  lisinopril 5 milliGRAM(s) Oral daily  Medical Marijuana Oil 1 milliLiter(s) 1 milliLiter(s) Oral <User Schedule>  midodrine 10 milliGRAM(s) Oral every 8 hours  multivitamin 1 Tablet(s) Oral daily  petrolatum white Ointment 1 Application(s) Topical three times a day  psyllium Powder 1 Packet(s) Oral daily  QUEtiapine 25 milliGRAM(s) Oral at bedtime  sodium chloride 0.9% Bolus 500 milliLiter(s) IV Bolus once  tiotropium 18 MICROgram(s) Capsule 1 Capsule(s) Inhalation daily  vancomycin  IVPB 1000 milliGRAM(s) IV Intermittent every 24 hours      MEDICATIONS  (PRN):  acetaminophen    Suspension .. 650 milliGRAM(s) Oral every 6 hours PRN Temp greater or equal to 38C (100.4F), Mild Pain (1 - 3), Moderate Pain (4 - 6)  Dakins Solution - 1/4 Strength 1 Application(s) Topical daily PRN if dressing soiled  diphenhydrAMINE   Elixir 25 milliGRAM(s) Oral every 6 hours PRN Rash and/or Itching  diphenhydrAMINE   Injectable 50 milliGRAM(s) IV Push once PRN Allergy symptoms  nystatin Powder 1 Application(s) Topical two times a day PRN rash/itchiness       ---___---___---___---___---___---     <<<REVIEW OF SYSTEM: >>>   unable to obtain      ---___---___---___---___---___---          <<<  VITAL SIGNS: >>>    T(F): 97.5 (03-27-19 @ 15:34), Max: 99.5 (03-26-19 @ 21:20)  HR: 98 (03-27-19 @ 15:34) (98 - 116)  BP: 112/73 (03-27-19 @ 15:34) (112/73 - 148/75)  RR: 18 (03-27-19 @ 15:34) (16 - 19)  SpO2: 99% (03-27-19 @ 15:34) (98% - 100%)  Wt(kg): --  CAPILLARY BLOOD GLUCOSE      POCT Blood Glucose.: 101 mg/dL (25 Mar 2019 22:06)    I&O's Summary    26 Mar 2019 07:01  -  27 Mar 2019 07:00  --------------------------------------------------------  IN: 240 mL / OUT: 1200 mL / NET: -960 mL    27 Mar 2019 07:01  -  27 Mar 2019 17:10  --------------------------------------------------------  IN: 1053 mL / OUT: 900 mL / NET: 153 mL         ---___---___---___---___---___---                       PHYSICAL EXAM:    GEN: A&O X 0 , NAD , comfortable  HEENT: NCAT, PERRL, MMM, no scleral icterus, hearing intact  NECK: Supple, No JVD  CVS: S1S2 , regular , No M/R/G appreciated  PULM: CTA B/L,  no W/R/R appreciated  ABD.: soft. non tender, non distended,  bowel sounds present  Extrem: intact pulses , no edema noted contracted   Derm: No rash or ecchymosis noted stage 4 saacral decubitus   PSYCH: normal mood,  depression,  anxious     ---___---___---___---___---___---     <<<  LAB AND IMAGING: >>>                          8.0    14.0  )-----------( 453      ( 27 Mar 2019 06:37 )             23.9               03-26    134<L>  |  99  |  28<H>  ----------------------------<  146<H>  4.0   |  21<L>  |  0.37<L>    Ca    9.1      26 Mar 2019 06:39      sepsis  continue iv abx and recommendation per id   dementia  supportive care   asthma  budesonide   contractures  will need pt   sacral decubitus  wound care following   lumbar fractures on medical marijuana for pain   agitation on seroquel   chf on lisinopril                         [All pertinent / recent available Imaging reports and other labs reviewed]     ---___---___---___---___---___---___ ---___---___---___---___---           <<<  A S S E S S M E N T   A N D   P L A N :  >>>          -GI/DVT Prophylaxis.    --------------------------------------------  Case discussed with   Education given on     >>______________________<<      Deniz Bolden .         phone   [phone]

## 2019-03-27 NOTE — PROGRESS NOTE ADULT - SUBJECTIVE AND OBJECTIVE BOX
infectious diseases progress note:    Patient is a 68y old  Female who presents with a chief complaint of fevers (26 Mar 2019 08:52)        Fever        R     Allergies    ASA; dye contrast (Anaphylaxis)  aspirin (Short breath)  divalproex sodium (Other (Unknown))  Haldol (Other (Unknown))  penicillin (Short breath; Rash)  sulfa drugs (Short breath; Rash)  vancomycin (Rash; Urticaria; Hives)  Xanax (Other (Unknown))    Intolerances        ANTIBIOTICS/RELEVANT:  antimicrobials  fluconAZOLE IVPB      fluconAZOLE IVPB 200 milliGRAM(s) IV Intermittent every 24 hours  vancomycin  IVPB 1000 milliGRAM(s) IV Intermittent every 24 hours    immunologic:    OTHER:  acetaminophen    Suspension .. 650 milliGRAM(s) Oral every 6 hours PRN  ALBUTerol    90 MICROgram(s) HFA Inhaler 1 Puff(s) Inhalation every 4 hours  ALBUTerol/ipratropium for Nebulization 3 milliLiter(s) Nebulizer every 6 hours  apixaban 5 milliGRAM(s) Oral every 12 hours  ascorbic acid 500 milliGRAM(s) Oral daily  buDESOnide 160 MICROgram(s)/formoterol 4.5 MICROgram(s) Inhaler 2 Puff(s) Inhalation two times a day  clonazePAM Tablet 1.5 milliGRAM(s) Oral <User Schedule>  Dakins Solution - 1/4 Strength 1 Application(s) Topical daily PRN  diphenhydrAMINE   Elixir 25 milliGRAM(s) Oral every 6 hours PRN  diphenhydrAMINE   Injectable 50 milliGRAM(s) IV Push once PRN  ergocalciferol 11733 Unit(s) Oral every week  famotidine    Tablet 20 milliGRAM(s) Oral daily  ferrous    sulfate Liquid 300 milliGRAM(s) Enteral Tube daily  gabapentin   Solution 300 milliGRAM(s) Oral two times a day  hydrocortisone sodium succinate Injectable 100 milliGRAM(s) IV Push every 8 hours  lisinopril 5 milliGRAM(s) Oral daily  Medical Marijuana Oil 1 milliLiter(s) 1 milliLiter(s) Oral <User Schedule>  midodrine 10 milliGRAM(s) Oral every 8 hours  multivitamin 1 Tablet(s) Oral daily  nystatin Powder 1 Application(s) Topical two times a day PRN  petrolatum white Ointment 1 Application(s) Topical three times a day  psyllium Powder 1 Packet(s) Oral daily  QUEtiapine 25 milliGRAM(s) Oral daily PRN  sodium chloride 0.9% Bolus 500 milliLiter(s) IV Bolus once  tiotropium 18 MICROgram(s) Capsule 1 Capsule(s) Inhalation daily      Objective:  Vital Signs Last 24 Hrs  T(C): 36.6 (27 Mar 2019 08:36), Max: 37.5 (26 Mar 2019 21:20)  T(F): 97.8 (27 Mar 2019 08:36), Max: 99.5 (26 Mar 2019 21:20)  HR: 102 (27 Mar 2019 08:36) (100 - 111)  BP: 133/65 (27 Mar 2019 08:36) (111/64 - 148/75)  BP(mean): --  RR: 18 (27 Mar 2019 09:01) (16 - 19)  SpO2: 100% (27 Mar 2019 09:01) (98% - 100%)    PHYSICAL EXAM:     Eyes:JACQUELIN, EOMI  Ear/Nose/Throat: no oral lesion, no sinus tenderness on percussion	  Neck:no JVD, no lymphadenopathy, supple  Respiratory: CTA maryjane  Cardiovascular: S1S2 RRR, no murmurs  Gastrointestinal:soft, (+) BS, no HSM  Extremities:n contracted         LABS:                        8.0    14.0  )-----------( 453      ( 27 Mar 2019 06:37 )             23.9     03-26    134<L>  |  99  |  28<H>  ----------------------------<  146<H>  4.0   |  21<L>  |  0.37<L>    Ca    9.1      26 Mar 2019 06:39              MICROBIOLOGY:    RECENT CULTURES:  03-25 @ 13:16 .Stool Feces                GI PCR Results: NOT detected  *******Please Note:*******  GI panel PCR evaluates for:  Campylobacter, Plesiomonas shigelloides, Salmonella,  Vibrio, Yersinia enterocolitica, Enteroaggregative  Escherichia coli (EAEC), Enteropathogenic E.coli (EPEC),  Enterotoxigenic E. coli (ETEC) lt/st, Shiga-like  toxin-producing E. coli (STEC) stx1/stx2,  Shigella/ Enteroinvasive E. coli (EIEC), Cryptosporidium,  Cyclospora cayetanensis, Entamoeba histolytica,  Giardia lamblia, Adenovirus F 40/41, Astrovirus,  Norovirus GI/GII, Rotavirus A, Sapovirus    03-25 @ 00:14 .Urine Catheterized                10,000 - 49,000 CFU/mL Yeast-like cells, presumptively not Candida  albicans    03-24 @ 23:42 .Blood Blood-Peripheral                No growth to date.    03-24 @ 23:40 .Blood Blood-Venous                No growth to date.          RESPIRATORY CULTURES:      Clostridium difficile [facility] Toxins A&amp;B, EIA:   Negative (03-25 @ 07:49)          RADIOLOGY & ADDITIONAL STUDIES:        Pager [phone]  After 5 pm/weekends or if no response :[phone]

## 2019-03-28 LAB
ANION GAP SERPL CALC-SCNC: 13 MMOL/L — SIGNIFICANT CHANGE UP (ref 5–17)
APPEARANCE UR: CLEAR — SIGNIFICANT CHANGE UP
BILIRUB UR-MCNC: NEGATIVE — SIGNIFICANT CHANGE UP
BUN SERPL-MCNC: 36 MG/DL — HIGH (ref 7–23)
CALCIUM SERPL-MCNC: 9.3 MG/DL — SIGNIFICANT CHANGE UP (ref 8.4–10.5)
CHLORIDE SERPL-SCNC: 98 MMOL/L — SIGNIFICANT CHANGE UP (ref 96–108)
CO2 SERPL-SCNC: 26 MMOL/L — SIGNIFICANT CHANGE UP (ref 22–31)
COLOR SPEC: YELLOW — SIGNIFICANT CHANGE UP
CREAT SERPL-MCNC: 0.34 MG/DL — LOW (ref 0.5–1.3)
DIFF PNL FLD: NEGATIVE — SIGNIFICANT CHANGE UP
GLUCOSE BLDC GLUCOMTR-MCNC: 204 MG/DL — HIGH (ref 70–99)
GLUCOSE BLDC GLUCOMTR-MCNC: 204 MG/DL — HIGH (ref 70–99)
GLUCOSE SERPL-MCNC: 178 MG/DL — HIGH (ref 70–99)
GLUCOSE UR QL: NEGATIVE — SIGNIFICANT CHANGE UP
HCT VFR BLD CALC: 23.6 % — LOW (ref 34.5–45)
HGB BLD-MCNC: 7.6 G/DL — LOW (ref 11.5–15.5)
KETONES UR-MCNC: NEGATIVE — SIGNIFICANT CHANGE UP
LEUKOCYTE ESTERASE UR-ACNC: NEGATIVE — SIGNIFICANT CHANGE UP
MCHC RBC-ENTMCNC: 29.3 PG — SIGNIFICANT CHANGE UP (ref 27–34)
MCHC RBC-ENTMCNC: 32.4 GM/DL — SIGNIFICANT CHANGE UP (ref 32–36)
MCV RBC AUTO: 90.4 FL — SIGNIFICANT CHANGE UP (ref 80–100)
NITRITE UR-MCNC: NEGATIVE — SIGNIFICANT CHANGE UP
PH UR: 6 — SIGNIFICANT CHANGE UP (ref 5–8)
PLATELET # BLD AUTO: 478 K/UL — HIGH (ref 150–400)
POTASSIUM SERPL-MCNC: 3.4 MMOL/L — LOW (ref 3.5–5.3)
POTASSIUM SERPL-SCNC: 3.4 MMOL/L — LOW (ref 3.5–5.3)
PROT UR-MCNC: ABNORMAL
RBC # BLD: 2.61 M/UL — LOW (ref 3.8–5.2)
RBC # FLD: 14.9 % — HIGH (ref 10.3–14.5)
SODIUM SERPL-SCNC: 137 MMOL/L — SIGNIFICANT CHANGE UP (ref 135–145)
SP GR SPEC: 1.03 — HIGH (ref 1.01–1.02)
UROBILINOGEN FLD QL: NEGATIVE — SIGNIFICANT CHANGE UP
WBC # BLD: 14.6 K/UL — HIGH (ref 3.8–10.5)
WBC # FLD AUTO: 14.6 K/UL — HIGH (ref 3.8–10.5)

## 2019-03-28 PROCEDURE — 99232 SBSQ HOSP IP/OBS MODERATE 35: CPT

## 2019-03-28 RX ORDER — ACETAMINOPHEN 500 MG
750 TABLET ORAL ONCE
Qty: 0 | Refills: 0 | Status: DISCONTINUED | OUTPATIENT
Start: 2019-03-28 | End: 2019-03-28

## 2019-03-28 RX ORDER — TIZANIDINE 4 MG/1
4 TABLET ORAL
Qty: 0 | Refills: 0 | Status: DISCONTINUED | OUTPATIENT
Start: 2019-03-28 | End: 2019-05-06

## 2019-03-28 RX ORDER — POTASSIUM CHLORIDE 20 MEQ
40 PACKET (EA) ORAL ONCE
Qty: 0 | Refills: 0 | Status: COMPLETED | OUTPATIENT
Start: 2019-03-28 | End: 2019-03-28

## 2019-03-28 RX ORDER — ACETAMINOPHEN 500 MG
600 TABLET ORAL ONCE
Qty: 0 | Refills: 0 | Status: COMPLETED | OUTPATIENT
Start: 2019-03-28 | End: 2019-03-28

## 2019-03-28 RX ORDER — SODIUM CHLORIDE 0.65 %
1 AEROSOL, SPRAY (ML) NASAL THREE TIMES A DAY
Qty: 0 | Refills: 0 | Status: DISCONTINUED | OUTPATIENT
Start: 2019-03-28 | End: 2019-05-06

## 2019-03-28 RX ORDER — TIZANIDINE 4 MG/1
4 TABLET ORAL AT BEDTIME
Qty: 0 | Refills: 0 | Status: DISCONTINUED | OUTPATIENT
Start: 2019-03-28 | End: 2019-03-28

## 2019-03-28 RX ADMIN — Medication 600 MILLIGRAM(S): at 22:15

## 2019-03-28 RX ADMIN — Medication 240 MILLIGRAM(S): at 21:58

## 2019-03-28 RX ADMIN — LISINOPRIL 5 MILLIGRAM(S): 2.5 TABLET ORAL at 06:28

## 2019-03-28 RX ADMIN — Medication 3 MILLILITER(S): at 17:07

## 2019-03-28 RX ADMIN — Medication 1 APPLICATION(S): at 06:41

## 2019-03-28 RX ADMIN — GABAPENTIN 300 MILLIGRAM(S): 400 CAPSULE ORAL at 21:58

## 2019-03-28 RX ADMIN — APIXABAN 5 MILLIGRAM(S): 2.5 TABLET, FILM COATED ORAL at 10:16

## 2019-03-28 RX ADMIN — QUETIAPINE FUMARATE 25 MILLIGRAM(S): 200 TABLET, FILM COATED ORAL at 21:59

## 2019-03-28 RX ADMIN — TIZANIDINE 4 MILLIGRAM(S): 4 TABLET ORAL at 10:16

## 2019-03-28 RX ADMIN — Medication 500 MILLIGRAM(S): at 10:16

## 2019-03-28 RX ADMIN — Medication 3 MILLILITER(S): at 11:52

## 2019-03-28 RX ADMIN — Medication 25 MILLIGRAM(S): at 10:16

## 2019-03-28 RX ADMIN — Medication 3 MILLILITER(S): at 06:27

## 2019-03-28 RX ADMIN — Medication 40 MILLIEQUIVALENT(S): at 11:51

## 2019-03-28 RX ADMIN — Medication 300 MILLIGRAM(S): at 10:16

## 2019-03-28 RX ADMIN — Medication 1 APPLICATION(S): at 13:42

## 2019-03-28 RX ADMIN — Medication 100 MILLIGRAM(S): at 06:28

## 2019-03-28 RX ADMIN — BUDESONIDE AND FORMOTEROL FUMARATE DIHYDRATE 2 PUFF(S): 160; 4.5 AEROSOL RESPIRATORY (INHALATION) at 20:29

## 2019-03-28 RX ADMIN — Medication 100 MILLIGRAM(S): at 13:48

## 2019-03-28 RX ADMIN — Medication 3 MILLILITER(S): at 23:22

## 2019-03-28 RX ADMIN — BUDESONIDE AND FORMOTEROL FUMARATE DIHYDRATE 2 PUFF(S): 160; 4.5 AEROSOL RESPIRATORY (INHALATION) at 10:16

## 2019-03-28 RX ADMIN — TIZANIDINE 4 MILLIGRAM(S): 4 TABLET ORAL at 21:58

## 2019-03-28 RX ADMIN — GABAPENTIN 300 MILLIGRAM(S): 400 CAPSULE ORAL at 11:52

## 2019-03-28 RX ADMIN — Medication 100 MILLIGRAM(S): at 21:59

## 2019-03-28 RX ADMIN — Medication 1.5 MILLIGRAM(S): at 22:02

## 2019-03-28 RX ADMIN — FLUCONAZOLE 100 MILLIGRAM(S): 150 TABLET ORAL at 06:26

## 2019-03-28 RX ADMIN — Medication 250 MILLIGRAM(S): at 11:51

## 2019-03-28 RX ADMIN — Medication 1 TABLET(S): at 10:16

## 2019-03-28 RX ADMIN — FAMOTIDINE 20 MILLIGRAM(S): 10 INJECTION INTRAVENOUS at 10:16

## 2019-03-28 RX ADMIN — APIXABAN 5 MILLIGRAM(S): 2.5 TABLET, FILM COATED ORAL at 20:29

## 2019-03-28 NOTE — PROVIDER CONTACT NOTE (OTHER) - ASSESSMENT
Patient's  removing Right Z-float boot whenever Z-float boots are placed on. Patient's  educated on the benefits of both Z-float boots Patient's  removing Right Z-float boot whenever Z-float boots are placed on. Patient's  educated on the benefits of both Z-float boots and patient's  verbalizes understanding of eduction but continues to refuse Right Z-float boots saying it is too much for his wife.

## 2019-03-28 NOTE — PROGRESS NOTE ADULT - SUBJECTIVE AND OBJECTIVE BOX
infectious diseases progress note:    Patient is a 68y old  Female who presents with a chief complaint of fevers (28 Mar 2019 07:18)        Fever          Allergies    ASA; dye contrast (Anaphylaxis)  aspirin (Short breath)  divalproex sodium (Other (Unknown))  Haldol (Other (Unknown))  penicillin (Short breath; Rash)  sulfa drugs (Short breath; Rash)  vancomycin (Rash; Urticaria; Hives)  Xanax (Other (Unknown))    Intolerances        ANTIBIOTICS/RELEVANT:  antimicrobials  vancomycin  IVPB 1000 milliGRAM(s) IV Intermittent every 24 hours    immunologic:    OTHER:  acetaminophen    Suspension .. 650 milliGRAM(s) Oral every 6 hours PRN  ALBUTerol    90 MICROgram(s) HFA Inhaler 1 Puff(s) Inhalation every 4 hours  ALBUTerol/ipratropium for Nebulization 3 milliLiter(s) Nebulizer every 6 hours  apixaban 5 milliGRAM(s) Oral every 12 hours  ascorbic acid 500 milliGRAM(s) Oral daily  buDESOnide 160 MICROgram(s)/formoterol 4.5 MICROgram(s) Inhaler 2 Puff(s) Inhalation two times a day  clonazePAM Tablet 1.5 milliGRAM(s) Oral <User Schedule>  Dakins Solution - 1/4 Strength 1 Application(s) Topical daily PRN  diphenhydrAMINE   Elixir 25 milliGRAM(s) Oral every 6 hours PRN  diphenhydrAMINE   Injectable 50 milliGRAM(s) IV Push once PRN  ergocalciferol 81622 Unit(s) Oral every week  famotidine    Tablet 20 milliGRAM(s) Oral daily  ferrous    sulfate Liquid 300 milliGRAM(s) Enteral Tube daily  gabapentin   Solution 300 milliGRAM(s) Oral two times a day  hydrocortisone sodium succinate Injectable 100 milliGRAM(s) IV Push every 8 hours  lisinopril 5 milliGRAM(s) Oral daily  Medical Marijuana Oil 1 milliLiter(s) 1 milliLiter(s) Oral <User Schedule>  midodrine 10 milliGRAM(s) Oral every 8 hours  multivitamin 1 Tablet(s) Oral daily  nystatin Powder 1 Application(s) Topical two times a day PRN  petrolatum white Ointment 1 Application(s) Topical three times a day  psyllium Powder 1 Packet(s) Oral daily  QUEtiapine 25 milliGRAM(s) Oral at bedtime  sodium chloride 0.9% Bolus 500 milliLiter(s) IV Bolus once  tiotropium 18 MICROgram(s) Capsule 1 Capsule(s) Inhalation daily  tiZANidine 4 milliGRAM(s) Oral <User Schedule>      Objective:  Vital Signs Last 24 Hrs  T(C): 36.5 (28 Mar 2019 08:04), Max: 37.2 (27 Mar 2019 12:34)  T(F): 97.7 (28 Mar 2019 08:04), Max: 98.9 (27 Mar 2019 12:34)  HR: 116 (28 Mar 2019 08:04) (94 - 120)  BP: 151/84 (28 Mar 2019 08:04) (112/73 - 151/84)  BP(mean): --  RR: 18 (28 Mar 2019 08:04) (16 - 18)  SpO2: 96% (28 Mar 2019 08:04) (96% - 100%)    PHYSICAL EXAM:  Eyes:JACQUELIN, EOMI  Ear/Nose/Throat: no oral lesion, no sinus tenderness on percussion	  Neck:no JVD, no lymphadenopathy, supple  Respiratory: CTA maryjane  Cardiovascular: S1S2 RRR, no murmurs  Gastrointestinal:soft, (+) BS, no HSM  Extremities:no e/e/c        LABS:                        7.6    14.6  )-----------( 478      ( 28 Mar 2019 06:41 )             23.6     03-28    137  |  98  |  36<H>  ----------------------------<  178<H>  3.4<L>   |  26  |  0.34<L>    Ca    9.3      28 Mar 2019 06:41              MICROBIOLOGY:    RECENT CULTURES:  03-25 @ 13:16 .Stool Feces                GI PCR Results: NOT detected  *******Please Note:*******  GI panel PCR evaluates for:  Campylobacter, Plesiomonas shigelloides, Salmonella,  Vibrio, Yersinia enterocolitica, Enteroaggregative  Escherichia coli (EAEC), Enteropathogenic E.coli (EPEC),  Enterotoxigenic E. coli (ETEC) lt/st, Shiga-like  toxin-producing E. coli (STEC) stx1/stx2,  Shigella/ Enteroinvasive E. coli (EIEC), Cryptosporidium,  Cyclospora cayetanensis, Entamoeba histolytica,  Giardia lamblia, Adenovirus F 40/41, Astrovirus,  Norovirus GI/GII, Rotavirus A, Sapovirus    03-25 @ 00:14 .Urine Catheterized                10,000 - 49,000 CFU/mL Yeast-like cells, presumptively not Candida  albicans    03-24 @ 23:42 .Blood Blood-Peripheral                No growth to date.    03-24 @ 23:40 .Blood Blood-Venous                No growth to date.          RESPIRATORY CULTURES:      Clostridium difficile [facility] Toxins A&amp;B, EIA:   Negative (03-25 @ 07:49)          RADIOLOGY & ADDITIONAL STUDIES:        Pager [phone]  After 5 pm/weekends or if no response :[phone]

## 2019-03-28 NOTE — PROVIDER CONTACT NOTE (OTHER) - ASSESSMENT
Patient asymptomatic. Patient Alert. Patient denies chest pain, pain, discomfort & shortness of breath. No respiratory distress noted. No non-verbal signs & symptoms of pain present. Patient's Heart Rate is 110's - 120's on the cardiac monitor & Heart Rhythm is Sinus Tachycardia on the cardiac monitor. Patient's Vital Signs: Temperature 97.7 F Oral, Heart Rate 116, Blood Pressure 151/84, Respiratory Rate 18 & O2 Saturation 96% Room Air. Patient's Lab Results: WBC Count 14.6. RBC Count 2.61. Hemoglobin 7.6. Hematocrit 23.6. Platelet Count - Automated 478. Potassium, Serum 3.4. Blood Urea Nitrogen, Serum 36. Creatinine, Serum 0.34.

## 2019-03-28 NOTE — PROVIDER CONTACT NOTE (OTHER) - ASSESSMENT
Patient Alert. Patient denies chest pain and shortness of breath. No respiratory distress noted. Patient's Respiratory Rate 18 and O2 Saturation 96% Room Air. Patient's Chest X-ray Results report: "Limited evaluation secondary to patient rotation. There is a small left-sided pleural effusion with left lung base consolidation which may be due to atelectasis and/or pneumonia". Patient has an active order for Vancomycin IVPB and Duoneb. Oral Suction completed as needed.

## 2019-03-28 NOTE — PROVIDER CONTACT NOTE (OTHER) - SITUATION
Patient's Heart Rate increased to as high as 150's on the cardiac monitor when patient was being washed & turned & positioned & Heart Rhythm is & was Sinus Tachycardia on the monitor.

## 2019-03-28 NOTE — PROVIDER CONTACT NOTE (OTHER) - BACKGROUND
Patient admitted for Fever, Hypertension, Anemia, Asthma, Dementia, Functional Quadriplegia, Pressure Injury, Heart Failure.

## 2019-03-28 NOTE — PROGRESS NOTE ADULT - ASSESSMENT
68 yr old with advanced dementia , bed bound, contracted, cva, sp removal of infected hip due to mrsa with placement of space last fall.   Pt has been havening fevers for months.  I suspect there is ongoing Om of the hip site but changing the spacer is not a realistic option . chronic Om of the sacrum usually does not cause fevers and does not need prolonged ab therapy .       cultures negative to date  uc with yeast of doubtful significance    vanco to cover mrsa  can dc meropenem  after cultures , we cac decide on endpoint of ab therapy   discussed  with      reluctant to rescan   does not want mri  temp is down on vanco    if it stays down , we will need to place picc and give her a course  of vanco even though it will  not cure a chronic hip infection    or discharge on po [id number]  discussed with  i detail

## 2019-03-28 NOTE — CHART NOTE - NSCHARTNOTEFT_GEN_A_CORE
CC:    HPI:  Notified by RN patient with temperature of F. Patient seen and examined  by me at bedside.   Patient is alert, NAD.  Patient denied headache, dizziness, chest pain, shortness of breath, cough, rhinorrhea, N/V/D, urinary symptoms, or abdominal pain.      ROS:  Unable to assess ROS secondary to cognitive deficits       PAST MEDICAL & SURGICAL HISTORY:  CVA (cerebral vascular accident)  Fatty pancreas  PNA (pneumonia)  Pulmonary HTN  IGT (impaired glucose tolerance)  Ulcerative colitis  Acid reflux  Anxiety  Depression  Mouth sores  HLD (hyperlipidemia)  Asthma  Humeral head fracture  H/O: hysterectomy  H/O cataract extraction, left  History of knee replacement            VITAL SIGNS:  T(C): 36.7 (03-28-19 @ 20:36), Max: 36.7 (03-28-19 @ 04:32)  HR: 118 (03-28-19 @ 20:36) (94 - 120)  BP: 128/73 (03-28-19 @ 20:36) (128/73 - 151/84)  RR: 18 (03-28-19 @ 17:44) (18 - 18)  SpO2: 96% (03-28-19 @ 20:36) (96% - 97%)      Physical Exam:  General: Chronically ill-appearing, cachectic woman laying in bed, contracted, NAD, alert and awake  Head:  NC/AT  CV: (+) tachycardic, S1S2   Respiratory: CTA B/L, nonlabored. No rales/rhonchi/wheezes.  Abdominal: (+) bowel sounds x4. Soft, NT, ND, no palpable mass, no guarding, or rebound tenderness  Genitourinary: (+) Gavin bag with yellow urine   MSK: No BLLE edema, + peripheral pulses, FROM all 4 extremity  Skin: (+) warm, dry   Psych: Appropriate affect       LABORATORY:                        7.6    14.6  )-----------( 478      ( 28 Mar 2019 06:41 )             23.6       03-28    137  |  98  |  36<H>  ----------------------------<  178<H>  3.4<L>   |  26  |  0.34<L>    Ca    9.3      28 Mar 2019 06:41              RADIOLOGY:  < from: Xray Chest 1 View- PORTABLE-Urgent (03.27.19 @ 22:31) >  IMPRESSION:  Limited evaluation secondary to patient rotation. There is a small   left-sided pleural effusion with left lung base consolidation which may   be due to atelectasis and/or pneumonia. PA and lateral x-rays are   recommended for complete evaluation. No pneumothorax. The cardiac   silhouette is poorly evaluated. No acute osseous findings.  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 History was provided by patient's daughters, Tere and Angie at bedside. Patient has been having persistent fever at home up to 103F. She has appeared more lethargic for the last 2 days and has had labored breathing, but no cough. She has loose nonbloody stools. No recent emesis, melena or hematuria. Urine output via gavin catheter is normal yellow appearance and had "good output." Patient gets regular wound care but has persistent ulcers. Because of persistent fevers, pt was brought to ED.     This patient was previously hospitalized from 2/26-3/18/2019 for fevers, found to have septic shock 2/2 UTI vs sacral ulcer vs PNA, requiring pressor support and MICU stay. 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 Offer was made to change the spacer and get cultures, however at that time, the  did not want to put the patient through surgery. Pt completed a course of meropenem, and was resumed on suppressive minocycline.     In the ED, T 100.8 Rectal  , /63, RR 14, SpO2 98%RA  MEETS SIRS CRITERIA based on HR and temperature.   She was given IVNS 1.5L, acetaminophen 650mg and meropenem 1g. (24 Mar 2019 20:54)      Patient now with temperature of    1) Fever  -Tylenol and cooling measures prn for pyrexia  -BC x2  -UA/UC  -CXR  -c/w   -Will continue to closely monitor patient/vitals   -Will endorse to primary team in AM      Luis Maria PA-C  Dept of Medicine CC: temperature 101F    HPI:  Notified by RN patient with temperature of 101F (rectal). Patient seen and examined  by me at bedside. Patient is alert, NAD.  Patient denied headache, dizziness, chest pain, shortness of breath, cough, rhinorrhea, N/V/D, urinary symptoms, or abdominal pain.      ROS:  Unable to assess ROS secondary to cognitive deficits       PAST MEDICAL & SURGICAL HISTORY:  CVA (cerebral vascular accident)  Fatty pancreas  PNA (pneumonia)  Pulmonary HTN  IGT (impaired glucose tolerance)  Ulcerative colitis  Acid reflux  Anxiety  Depression  Mouth sores  HLD (hyperlipidemia)  Asthma  Humeral head fracture  H/O: hysterectomy  H/O cataract extraction, left  History of knee replacement        Vital Signs Last 24 Hrs  T(C): 38.3 (28 Mar 2019 21:00), Max: 38.3 (28 Mar 2019 21:00)  T(F): 101 (28 Mar 2019 21:00), Max: 101 (28 Mar 2019 21:00)  HR: 118 (28 Mar 2019 20:36) (94 - 120)  BP: 128/73 (28 Mar 2019 20:36) (128/73 - 151/84)  RR: 18 (28 Mar 2019 17:44) (18 - 18)  SpO2: 96% (28 Mar 2019 20:36) (96% - 97%)        Physical Exam:  General: Chronically ill-appearing, cachectic woman laying in bed, contracted, NAD, alert and awake  Head:  NC/AT  CV: (+) tachycardic, S1S2   Respiratory: CTA B/L, nonlabored. No rales/rhonchi/wheezes.  Abdominal: (+) bowel sounds x4. Soft, NT, ND, no palpable mass, no guarding, or rebound tenderness  Genitourinary: (+) Gavin bag with yellow urine   MSK: No BLLE edema, + peripheral pulses, FROM all 4 extremity  Skin: (+) warm, dry   Psych: Appropriate affect       LABORATORY:                        7.6    14.6  )-----------( 478      ( 28 Mar 2019 06:41 )             23.6       03-28    137  |  98  |  36<H>  ----------------------------<  178<H>  3.4<L>   |  26  |  0.34<L>    Ca    9.3      28 Mar 2019 06:41          RADIOLOGY:  < from: Xray Chest 1 View- PORTABLE-Urgent (03.27.19 @ 22:31) >  IMPRESSION:  Limited evaluation secondary to patient rotation. There is a small   left-sided pleural effusion with left lung base consolidation which may   be due to atelectasis and/or pneumonia. PA and lateral x-rays are   recommended for complete evaluation. No pneumothorax. The cardiac   silhouette is poorly evaluated. No acute osseous findings.  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 History was provided by patient's daughters, Tere and Angie at bedside. Patient has been having persistent fever at home up to 103F. She has appeared more lethargic for the last 2 days and has had labored breathing, but no cough. She has loose nonbloody stools. No recent emesis, melena or hematuria. Urine output via gavin catheter is normal yellow appearance and had "good output." Patient gets regular wound care but has persistent ulcers. Because of persistent fevers, pt was brought to ED.     This patient was previously hospitalized from 2/26-3/18/2019 for fevers, found to have septic shock 2/2 UTI vs sacral ulcer vs PNA, requiring pressor support and MICU stay. 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 Offer was made to change the spacer and get cultures, however at that time, the  did not want to put the patient through surgery. Pt completed a course of meropenem, and was resumed on suppressive minocycline.     In the ED, T 100.8 Rectal  , /63, RR 14, SpO2 98%RA  MEETS SIRS CRITERIA based on HR and temperature.   She was given IVNS 1.5L, acetaminophen 650mg and meropenem 1g. (24 Mar 2019 20:54)      Patient now with temperature of 101F.     1) Fever of unknown origin- ?secondary to chronic OM  -Ofirmev 600mg IV x1  -Cooling measures PRN pyrexia  -BCx x2 ordered  -UA/UCx ordered  -CXR (3/27) with "small left-sided pleural effusion with left lung base consolidation which may be due to atelectasis and/or pneumonia"  -c/w vancomycin  -ID is following patient  -Will continue to closely monitor patient/vitals   -Will endorse to primary team in SARAH Maria PA-C  Dept of Medicine  [id number] CC: temperature 101F    HPI:  Notified by RN patient with temperature of 101F (rectal). Patient seen and examined by me at bedside. Patient is alert, NAD. Unable to assess ROS because patient is nonverbal.       ROS:  Unable to assess ROS because patient is nonverbal       PAST MEDICAL & SURGICAL HISTORY:  CVA (cerebral vascular accident)  Fatty pancreas  PNA (pneumonia)  Pulmonary HTN  IGT (impaired glucose tolerance)  Ulcerative colitis  Acid reflux  Anxiety  Depression  Mouth sores  HLD (hyperlipidemia)  Asthma  Humeral head fracture  H/O: hysterectomy  H/O cataract extraction, left  History of knee replacement        Vital Signs Last 24 Hrs  T(C): 38.3 (28 Mar 2019 21:00), Max: 38.3 (28 Mar 2019 21:00)  T(F): 101 (28 Mar 2019 21:00), Max: 101 (28 Mar 2019 21:00)  HR: 118 (28 Mar 2019 20:36) (94 - 120)  BP: 128/73 (28 Mar 2019 20:36) (128/73 - 151/84)  RR: 18 (28 Mar 2019 17:44) (18 - 18)  SpO2: 96% (28 Mar 2019 20:36) (96% - 97%)        Physical Exam:  General: Chronically ill-appearing, cachectic woman laying in bed, contracted, NAD, alert and awake  Head:  NC/AT  CV: (+) tachycardic, S1S2   Respiratory: CTA B/L, nonlabored. No rales/rhonchi/wheezes.  Abdominal: (+) hypoactive bowel sounds x4. Soft, NT, ND, no guarding, or rebound tenderness  Genitourinary: (+) Gavin bag with yellow urine   MSK: No BLLE edema, + peripheral pulses  Skin: (+) warm, dry   Psych: Nonverbal       LABORATORY:                        7.6    14.6  )-----------( 478      ( 28 Mar 2019 06:41 )             23.6       03-28    137  |  98  |  36<H>  ----------------------------<  178<H>  3.4<L>   |  26  |  0.34<L>    Ca    9.3      28 Mar 2019 06:41          RADIOLOGY:  < from: Xray Chest 1 View- PORTABLE-Urgent (03.27.19 @ 22:31) >  IMPRESSION:  Limited evaluation secondary to patient rotation. There is a small   left-sided pleural effusion with left lung base consolidation which may   be due to atelectasis and/or pneumonia. PA and lateral x-rays are   recommended for complete evaluation. No pneumothorax. The cardiac   silhouette is poorly evaluated. No acute osseous findings.  < end of copied text >    < from: CT Chest No Cont (03.25.19 @ 03:06) >  IMPRESSION:   Distended esophagus with ingested material to the level of the stoney.   Small left pleural effusion with adjacent atelectasis.  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 History was provided by patient's daughters, Tere and Angie at bedside. Patient has been having persistent fever at home up to 103F. She has appeared more lethargic for the last 2 days and has had labored breathing, but no cough. She has loose nonbloody stools. No recent emesis, melena or hematuria. Urine output via gavin catheter is normal yellow appearance and had "good output." Patient gets regular wound care but has persistent ulcers. Because of persistent fevers, pt was brought to ED.     This patient was previously hospitalized from 2/26-3/18/2019 for fevers, found to have septic shock 2/2 UTI vs sacral ulcer vs PNA, requiring pressor support and MICU stay. 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 Offer was made to change the spacer and get cultures, however at that time, the  did not want to put the patient through surgery. Pt completed a course of meropenem, and was resumed on suppressive minocycline.     In the ED, T 100.8 Rectal  , /63, RR 14, SpO2 98%RA  MEETS SIRS CRITERIA based on HR and temperature.   She was given IVNS 1.5L, acetaminophen 650mg and meropenem 1g. (24 Mar 2019 20:54)      Patient now presenting acutely with temperature of 101F. Blood cultures collected 3/24 without growth. Urine culture collected 3/24 with growth of "yeast like cells."     1) Fever of unknown origin- ?secondary to chronic OM  -Ofirmev 600mg IV x1  -Cooling measures PRN pyrexia  -BCx x2 ordered  -UA/UCx ordered  -RVP negative 3/24  -CXR (3/27) with "small left-sided pleural effusion with left lung base consolidation which may be due to atelectasis and/or pneumonia"  -c/w vancomycin  -ID is following patient, appreciate recommendations  -Will continue to closely monitor patient/vitals   -Will endorse to primary team in       Luis Maria PA-C  Dept of Medicine  [id number] CC: temperature 101F    HPI:  Notified by RN patient with temperature of 101F (rectal). Patient seen and examined by me at bedside. Patient is alert, NAD. Unable to assess ROS because patient is nonverbal.       ROS:  Unable to assess ROS because patient is nonverbal       PAST MEDICAL & SURGICAL HISTORY:  CVA (cerebral vascular accident)  Fatty pancreas  PNA (pneumonia)  Pulmonary HTN  IGT (impaired glucose tolerance)  Ulcerative colitis  Acid reflux  Anxiety  Depression  Mouth sores  HLD (hyperlipidemia)  Asthma  Humeral head fracture  H/O: hysterectomy  H/O cataract extraction, left  History of knee replacement        Vital Signs Last 24 Hrs  T(C): 38.3 (28 Mar 2019 21:00), Max: 38.3 (28 Mar 2019 21:00)  T(F): 101 (28 Mar 2019 21:00), Max: 101 (28 Mar 2019 21:00)  HR: 118 (28 Mar 2019 20:36) (94 - 120)  BP: 128/73 (28 Mar 2019 20:36) (128/73 - 151/84)  RR: 18 (28 Mar 2019 17:44) (18 - 18)  SpO2: 96% (28 Mar 2019 20:36) (96% - 97%)        Physical Exam:  General: Chronically ill-appearing, cachectic woman laying in bed, contracted, NAD, alert and awake  Head:  NC/AT  CV: (+) tachycardic, S1S2   Respiratory: CTA B/L, nonlabored. No rales/rhonchi/wheezes.  Abdominal: (+) hypoactive bowel sounds x4. Soft, NT, ND, no guarding, or rebound tenderness  Genitourinary: (+) Gavin bag with yellow urine   MSK: No BLLE edema, + peripheral pulses  Skin: (+) warm, dry       LABORATORY:                        7.6    14.6  )-----------( 478      ( 28 Mar 2019 06:41 )             23.6       03-28    137  |  98  |  36<H>  ----------------------------<  178<H>  3.4<L>   |  26  |  0.34<L>    Ca    9.3      28 Mar 2019 06:41          RADIOLOGY:  < from: Xray Chest 1 View- PORTABLE-Urgent (03.27.19 @ 22:31) >  IMPRESSION:  Limited evaluation secondary to patient rotation. There is a small   left-sided pleural effusion with left lung base consolidation which may   be due to atelectasis and/or pneumonia. PA and lateral x-rays are   recommended for complete evaluation. No pneumothorax. The cardiac   silhouette is poorly evaluated. No acute osseous findings.  < end of copied text >    < from: CT Chest No Cont (03.25.19 @ 03:06) >  IMPRESSION:   Distended esophagus with ingested material to the level of the stoney.   Small left pleural effusion with adjacent atelectasis.  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 History was provided by patient's daughters, Tere and Angie at bedside. Patient has been having persistent fever at home up to 103F. She has appeared more lethargic for the last 2 days and has had labored breathing, but no cough. Patient gets regular wound care but has persistent ulcers. Because of persistent fevers, pt was brought to ED. (24 Mar 2019 20:54)  Patient now presenting acutely with temperature of 101F. Blood cultures collected 3/24 without growth. Urine culture collected 3/24 with growth of "yeast like cells."     1) Fever of unknown origin- ?secondary to chronic OM  -Ofirmev 600mg IV x1  -Cooling measures PRN pyrexia  -BCx x2 ordered  -UA/UCx ordered  -RVP negative 3/24  -CXR (3/27) with "small left-sided pleural effusion with left lung base consolidation which may be due to atelectasis and/or pneumonia"  -c/w vancomycin  -ID is following patient, appreciate recommendations  -Will continue to closely monitor patient/vitals   -Will endorse to primary team in AM      Luis Maria PA-C  Dept of Medicine  [id number]

## 2019-03-28 NOTE — PROGRESS NOTE ADULT - SUBJECTIVE AND OBJECTIVE BOX
---___---___---___---___---___---___ ---___---___---___---___---___---___---___---___---___---                    <<<  M E D I C A L   A T T E N D I N G    F O L L O W    U P   N O T E  >>>    -Doing better afebrile for greater than 24 hours     ---___---___---___---___---___---      <<<  MEDICATIONS:  >>>    MEDICATIONS  (STANDING):  ALBUTerol    90 MICROgram(s) HFA Inhaler 1 Puff(s) Inhalation every 4 hours  ALBUTerol/ipratropium for Nebulization 3 milliLiter(s) Nebulizer every 6 hours  apixaban 5 milliGRAM(s) Oral every 12 hours  ascorbic acid 500 milliGRAM(s) Oral daily  buDESOnide 160 MICROgram(s)/formoterol 4.5 MICROgram(s) Inhaler 2 Puff(s) Inhalation two times a day  clonazePAM Tablet 1.5 milliGRAM(s) Oral <User Schedule>  ergocalciferol 62890 Unit(s) Oral every week  famotidine    Tablet 20 milliGRAM(s) Oral daily  ferrous    sulfate Liquid 300 milliGRAM(s) Enteral Tube daily  fluconAZOLE IVPB      fluconAZOLE IVPB 200 milliGRAM(s) IV Intermittent every 24 hours  gabapentin   Solution 300 milliGRAM(s) Oral two times a day  hydrocortisone sodium succinate Injectable 100 milliGRAM(s) IV Push every 8 hours  lisinopril 5 milliGRAM(s) Oral daily  Medical Marijuana Oil 1 milliLiter(s) 1 milliLiter(s) Oral <User Schedule>  midodrine 10 milliGRAM(s) Oral every 8 hours  multivitamin 1 Tablet(s) Oral daily  petrolatum white Ointment 1 Application(s) Topical three times a day  psyllium Powder 1 Packet(s) Oral daily  QUEtiapine 25 milliGRAM(s) Oral at bedtime  sodium chloride 0.9% Bolus 500 milliLiter(s) IV Bolus once  tiotropium 18 MICROgram(s) Capsule 1 Capsule(s) Inhalation daily  vancomycin  IVPB 1000 milliGRAM(s) IV Intermittent every 24 hours      MEDICATIONS  (PRN):  acetaminophen    Suspension .. 650 milliGRAM(s) Oral every 6 hours PRN Temp greater or equal to 38C (100.4F), Mild Pain (1 - 3), Moderate Pain (4 - 6)  Dakins Solution - 1/4 Strength 1 Application(s) Topical daily PRN if dressing soiled  diphenhydrAMINE   Elixir 25 milliGRAM(s) Oral every 6 hours PRN Rash and/or Itching  diphenhydrAMINE   Injectable 50 milliGRAM(s) IV Push once PRN Allergy symptoms  nystatin Powder 1 Application(s) Topical two times a day PRN rash/itchiness       ---___---___---___---___---___---     <<<REVIEW OF SYSTEM: >>>    unable to obtain      ---___---___---___---___---___---          <<<  VITAL SIGNS: >>>    T(F): 98.1 (03-28-19 @ 04:32), Max: 98.9 (03-27-19 @ 12:34)  HR: 94 (03-28-19 @ 04:32) (94 - 120)  BP: 138/65 (03-28-19 @ 04:32) (112/73 - 146/68)  RR: 18 (03-28-19 @ 04:32) (16 - 18)  SpO2: 96% (03-28-19 @ 04:32) (96% - 100%)  Wt(kg): --  CAPILLARY BLOOD GLUCOSE        I&O's Summary    27 Mar 2019 07:01  -  28 Mar 2019 07:00  --------------------------------------------------------  IN: 1153 mL / OUT: 1700 mL / NET: -547 mL         ---___---___---___---___---___---                       PHYSICAL EXAM:    GEN: A&O X 0 , NAD , comfortable  HEENT: NCAT, PERRL, MMM, no scleral icterus, hearing intact  NECK: Supple, No JVD  CVS: S1S2 , regular , No M/R/G appreciated  PULM: CTA B/L,  no W/R/R appreciated  ABD.: soft. non tender, non distended,  bowel sounds present peg noted   Extrem: intact pulses , no edema noted contractures noted   Derm: No rash or ecchymosis noted sacral decubitus noted         ---___---___---___---___---___---     <<<  LAB AND IMAGING: >>>                          7.6    14.6  )-----------( 478      ( 28 Mar 2019 06:41 )             23.6               03-28    137  |  98  |  36<H>  ----------------------------<  178<H>  3.4<L>   |  26  |  0.34<L>    Ca    9.3      28 Mar 2019 06:41                                 [All pertinent / recent available Imaging reports and other labs reviewed]     ---___---___---___---___---___---___ ---___---___---___---___---           <<<  A S S E S S M E N T   A N D   P L A N :  >>>    sepsis continue iv abx   asthma on budesonide   dementia supportive care   chronic pain lumbar vertebral fracture  on medical marijuana   contracture add tizanidine   h/o dvt and functional quadraplegia on eliquis     id following   continue to monitor cbc      -GI/DVT Prophylaxis.    --------------------------------------------  Case discussed with   Education given on     >>______________________<<      Deniz Bolden .         phone   [phone]

## 2019-03-29 LAB
ALBUMIN SERPL ELPH-MCNC: 3 G/DL — LOW (ref 3.3–5)
ALP SERPL-CCNC: 125 U/L — HIGH (ref 40–120)
ALT FLD-CCNC: 16 U/L — SIGNIFICANT CHANGE UP (ref 10–45)
ANION GAP SERPL CALC-SCNC: 13 MMOL/L — SIGNIFICANT CHANGE UP (ref 5–17)
AST SERPL-CCNC: 12 U/L — SIGNIFICANT CHANGE UP (ref 10–40)
BASOPHILS # BLD AUTO: 0 K/UL — SIGNIFICANT CHANGE UP (ref 0–0.2)
BASOPHILS NFR BLD AUTO: 0.2 % — SIGNIFICANT CHANGE UP (ref 0–2)
BILIRUB SERPL-MCNC: 0.2 MG/DL — SIGNIFICANT CHANGE UP (ref 0.2–1.2)
BUN SERPL-MCNC: 43 MG/DL — HIGH (ref 7–23)
CALCIUM SERPL-MCNC: 8.8 MG/DL — SIGNIFICANT CHANGE UP (ref 8.4–10.5)
CHLORIDE SERPL-SCNC: 98 MMOL/L — SIGNIFICANT CHANGE UP (ref 96–108)
CO2 SERPL-SCNC: 25 MMOL/L — SIGNIFICANT CHANGE UP (ref 22–31)
CREAT SERPL-MCNC: 0.35 MG/DL — LOW (ref 0.5–1.3)
EOSINOPHIL # BLD AUTO: 0.1 K/UL — SIGNIFICANT CHANGE UP (ref 0–0.5)
EOSINOPHIL NFR BLD AUTO: 0.4 % — SIGNIFICANT CHANGE UP (ref 0–6)
GLUCOSE SERPL-MCNC: 188 MG/DL — HIGH (ref 70–99)
HCT VFR BLD CALC: 23 % — LOW (ref 34.5–45)
HGB BLD-MCNC: 7.8 G/DL — LOW (ref 11.5–15.5)
LYMPHOCYTES # BLD AUTO: 0.8 K/UL — LOW (ref 1–3.3)
LYMPHOCYTES # BLD AUTO: 4.2 % — LOW (ref 13–44)
MAGNESIUM SERPL-MCNC: 1.9 MG/DL — SIGNIFICANT CHANGE UP (ref 1.6–2.6)
MCHC RBC-ENTMCNC: 30.7 PG — SIGNIFICANT CHANGE UP (ref 27–34)
MCHC RBC-ENTMCNC: 34.1 GM/DL — SIGNIFICANT CHANGE UP (ref 32–36)
MCV RBC AUTO: 89.9 FL — SIGNIFICANT CHANGE UP (ref 80–100)
MONOCYTES # BLD AUTO: 0.9 K/UL — SIGNIFICANT CHANGE UP (ref 0–0.9)
MONOCYTES NFR BLD AUTO: 4.4 % — SIGNIFICANT CHANGE UP (ref 2–14)
NEUTROPHILS # BLD AUTO: 18.1 K/UL — HIGH (ref 1.8–7.4)
NEUTROPHILS NFR BLD AUTO: 90.8 % — HIGH (ref 43–77)
PHOSPHATE SERPL-MCNC: 2.2 MG/DL — LOW (ref 2.5–4.5)
PLAT MORPH BLD: NORMAL — SIGNIFICANT CHANGE UP
PLATELET # BLD AUTO: 481 K/UL — HIGH (ref 150–400)
POTASSIUM SERPL-MCNC: 4.2 MMOL/L — SIGNIFICANT CHANGE UP (ref 3.5–5.3)
POTASSIUM SERPL-SCNC: 4.2 MMOL/L — SIGNIFICANT CHANGE UP (ref 3.5–5.3)
PROT SERPL-MCNC: 5.6 G/DL — LOW (ref 6–8.3)
RBC # BLD: 2.56 M/UL — LOW (ref 3.8–5.2)
RBC # FLD: 15.5 % — HIGH (ref 10.3–14.5)
RBC BLD AUTO: SIGNIFICANT CHANGE UP
SODIUM SERPL-SCNC: 136 MMOL/L — SIGNIFICANT CHANGE UP (ref 135–145)
VANCOMYCIN TROUGH SERPL-MCNC: 14 UG/ML — SIGNIFICANT CHANGE UP (ref 10–20)
WBC # BLD: 19.9 K/UL — HIGH (ref 3.8–10.5)
WBC # FLD AUTO: 19.9 K/UL — HIGH (ref 3.8–10.5)

## 2019-03-29 PROCEDURE — 99232 SBSQ HOSP IP/OBS MODERATE 35: CPT

## 2019-03-29 RX ADMIN — Medication 500 MILLIGRAM(S): at 12:22

## 2019-03-29 RX ADMIN — Medication 1 TABLET(S): at 12:23

## 2019-03-29 RX ADMIN — QUETIAPINE FUMARATE 25 MILLIGRAM(S): 200 TABLET, FILM COATED ORAL at 21:58

## 2019-03-29 RX ADMIN — TIZANIDINE 4 MILLIGRAM(S): 4 TABLET ORAL at 21:58

## 2019-03-29 RX ADMIN — Medication 50 MILLIGRAM(S): at 10:11

## 2019-03-29 RX ADMIN — GABAPENTIN 300 MILLIGRAM(S): 400 CAPSULE ORAL at 20:51

## 2019-03-29 RX ADMIN — Medication 100 MILLIGRAM(S): at 14:20

## 2019-03-29 RX ADMIN — TIZANIDINE 4 MILLIGRAM(S): 4 TABLET ORAL at 08:53

## 2019-03-29 RX ADMIN — APIXABAN 5 MILLIGRAM(S): 2.5 TABLET, FILM COATED ORAL at 20:51

## 2019-03-29 RX ADMIN — Medication 3 MILLILITER(S): at 18:06

## 2019-03-29 RX ADMIN — GABAPENTIN 300 MILLIGRAM(S): 400 CAPSULE ORAL at 10:10

## 2019-03-29 RX ADMIN — Medication 300 MILLIGRAM(S): at 12:23

## 2019-03-29 RX ADMIN — FAMOTIDINE 20 MILLIGRAM(S): 10 INJECTION INTRAVENOUS at 12:22

## 2019-03-29 RX ADMIN — BUDESONIDE AND FORMOTEROL FUMARATE DIHYDRATE 2 PUFF(S): 160; 4.5 AEROSOL RESPIRATORY (INHALATION) at 20:52

## 2019-03-29 RX ADMIN — Medication 100 MILLIGRAM(S): at 21:58

## 2019-03-29 RX ADMIN — Medication 250 MILLIGRAM(S): at 12:20

## 2019-03-29 RX ADMIN — LISINOPRIL 5 MILLIGRAM(S): 2.5 TABLET ORAL at 05:59

## 2019-03-29 RX ADMIN — Medication 1 APPLICATION(S): at 07:41

## 2019-03-29 RX ADMIN — Medication 100 MILLIGRAM(S): at 05:59

## 2019-03-29 RX ADMIN — Medication 1 APPLICATION(S): at 22:12

## 2019-03-29 RX ADMIN — APIXABAN 5 MILLIGRAM(S): 2.5 TABLET, FILM COATED ORAL at 08:52

## 2019-03-29 RX ADMIN — Medication 3 MILLILITER(S): at 12:21

## 2019-03-29 RX ADMIN — BUDESONIDE AND FORMOTEROL FUMARATE DIHYDRATE 2 PUFF(S): 160; 4.5 AEROSOL RESPIRATORY (INHALATION) at 08:52

## 2019-03-29 RX ADMIN — Medication 3 MILLILITER(S): at 05:58

## 2019-03-29 RX ADMIN — Medication 1.5 MILLIGRAM(S): at 21:58

## 2019-03-29 RX ADMIN — Medication 1 APPLICATION(S): at 14:06

## 2019-03-29 NOTE — PROGRESS NOTE ADULT - ASSESSMENT
68 yr old with advanced dementia , bed bound, contracted, cva, sp removal of infected hip due to mrsa with placement of space last fall.   Pt has been havening fevers for months.  I suspect there is ongoing Om of the hip site but changing the spacer is not a realistic option . chronic Om of the sacrum usually does not cause fevers and does not need prolonged ab therapy .       cultures negative to date  uc with yeast of doubtful significance    vanco to cover mrsa  can dc meropenem  after cultures , we cac decide on endpoint of ab therapy   discussed  with      reluctant to rescan   does not want mri  temp is down on vanco    if it stays down , we will need to place picc and give her a course  of vanco even though it will  not cure a chronic hip infection    or discharge on po zyvoxx  discussed with  i detail   wbc and temp up lasr 24 hr  to continue to observe    no good options

## 2019-03-29 NOTE — PROGRESS NOTE ADULT - SUBJECTIVE AND OBJECTIVE BOX
infectious diseases progress note:    Patient is a 68y old  Female who presents with a chief complaint of fevers (29 Mar 2019 07:36)        Fever             Allergies    ASA; dye contrast (Anaphylaxis)  aspirin (Short breath)  divalproex sodium (Other (Unknown))  Haldol (Other (Unknown))  penicillin (Short breath; Rash)  sulfa drugs (Short breath; Rash)  vancomycin (Rash; Urticaria; Hives)  Xanax (Other (Unknown))    Intolerances        ANTIBIOTICS/RELEVANT:  antimicrobials  vancomycin  IVPB 1000 milliGRAM(s) IV Intermittent every 24 hours    immunologic:    OTHER:  acetaminophen    Suspension .. 650 milliGRAM(s) Oral every 6 hours PRN  ALBUTerol    90 MICROgram(s) HFA Inhaler 1 Puff(s) Inhalation every 4 hours  ALBUTerol/ipratropium for Nebulization 3 milliLiter(s) Nebulizer every 6 hours  apixaban 5 milliGRAM(s) Oral every 12 hours  ascorbic acid 500 milliGRAM(s) Oral daily  buDESOnide 160 MICROgram(s)/formoterol 4.5 MICROgram(s) Inhaler 2 Puff(s) Inhalation two times a day  clonazePAM Tablet 1.5 milliGRAM(s) Oral <User Schedule>  Dakins Solution - 1/4 Strength 1 Application(s) Topical daily PRN  diphenhydrAMINE   Elixir 25 milliGRAM(s) Oral every 6 hours PRN  diphenhydrAMINE   Injectable 50 milliGRAM(s) IV Push once PRN  ergocalciferol 08399 Unit(s) Oral every week  famotidine    Tablet 20 milliGRAM(s) Oral daily  ferrous    sulfate Liquid 300 milliGRAM(s) Enteral Tube daily  gabapentin   Solution 300 milliGRAM(s) Oral two times a day  hydrocortisone sodium succinate Injectable 100 milliGRAM(s) IV Push every 8 hours  lisinopril 5 milliGRAM(s) Oral daily  Medical Marijuana Oil 1 milliLiter(s) 1 milliLiter(s) Oral <User Schedule>  midodrine 10 milliGRAM(s) Oral every 8 hours  multivitamin 1 Tablet(s) Oral daily  nystatin Powder 1 Application(s) Topical two times a day PRN  petrolatum white Ointment 1 Application(s) Topical three times a day  QUEtiapine 25 milliGRAM(s) Oral at bedtime  sodium chloride 0.65% Nasal 1 Spray(s) Both Nostrils three times a day PRN  sodium chloride 0.9% Bolus 500 milliLiter(s) IV Bolus once  tiotropium 18 MICROgram(s) Capsule 1 Capsule(s) Inhalation daily  tiZANidine 4 milliGRAM(s) Oral <User Schedule>      Objective:  Vital Signs Last 24 Hrs  T(C): 36.6 (29 Mar 2019 06:04), Max: 38.3 (28 Mar 2019 21:00)  T(F): 97.8 (29 Mar 2019 06:04), Max: 101 (28 Mar 2019 21:00)  HR: 110 (29 Mar 2019 06:04) (110 - 120)  BP: 126/72 (29 Mar 2019 06:04) (126/72 - 151/84)  BP(mean): --  RR: 19 (29 Mar 2019 06:04) (18 - 19)  SpO2: 97% (29 Mar 2019 06:04) (96% - 97%)    PHYSICAL EXAM:     Eyes:JACQUELIN, EOMI  Ear/Nose/Throat: no oral lesion, no sinus tenderness on percussion	  Neck:no JVD, no lymphadenopathy, supple  Respiratory: CTA maryjane  Cardiovascular: S1S2 RRR, no murmurs  Gastrointestinal:soft, (+) BS, no HSM  Extremities:no e/e/c        LABS:                        7.8    19.9  )-----------( 481      ( 29 Mar 2019 06:37 )             23.0         136  |  98  |  43<H>  ----------------------------<  188<H>  4.2   |  25  |  0.35<L>    Ca    8.8      29 Mar 2019 06:36  Phos  2.2       Mg     1.9         TPro  5.6<L>  /  Alb  3.0<L>  /  TBili  0.2  /  DBili  x   /  AST  12  /  ALT  16  /  AlkPhos  125<H>        Urinalysis Basic - ( 28 Mar 2019 23:38 )    Color: Yellow / Appearance: Clear / S.026 / pH: x  Gluc: x / Ketone: Negative  / Bili: Negative / Urobili: Negative   Blood: x / Protein: 30 mg/dL / Nitrite: Negative   Leuk Esterase: Negative / RBC: 3 /hpf / WBC 5 /HPF   Sq Epi: x / Non Sq Epi: 1 /hpf / Bacteria: Negative          MICROBIOLOGY:    RECENT CULTURES:   @ 13:16 .Stool Feces                GI PCR Results: NOT detected  *******Please Note:*******  GI panel PCR evaluates for:  Campylobacter, Plesiomonas shigelloides, Salmonella,  Vibrio, Yersinia enterocolitica, Enteroaggregative  Escherichia coli (EAEC), Enteropathogenic E.coli (EPEC),  Enterotoxigenic E. coli (ETEC) lt/st, Shiga-like  toxin-producing E. coli (STEC) stx1/stx2,  Shigella/ Enteroinvasive E. coli (EIEC), Cryptosporidium,  Cyclospora cayetanensis, Entamoeba histolytica,  Giardia lamblia, Adenovirus F 40/41, Astrovirus,  Norovirus GI/GII, Rotavirus A, Sapovirus     @ 00:14 .Urine Catheterized                10,000 - 49,000 CFU/mL Yeast-like cells, presumptively not Candida  albicans     @ 23:42 .Blood Blood-Peripheral                No growth to date.     @ 23:40 .Blood Blood-Venous                No growth to date.          RESPIRATORY CULTURES:      Clostridium difficile [facility]ins A&amp;B, EIA:   Negative ( @ 07:49)          RADIOLOGY & ADDITIONAL STUDIES:        Pager [phone]  After 5 pm/weekends or if no response :[phone]

## 2019-03-29 NOTE — CHART NOTE - NSCHARTNOTEFT_GEN_A_CORE
Nutrition Follow Up Note  Patient seen for: follow up    Source: nurse, , PCA    Diet : Glucerna 1.5 bolus, 4 cans daily, tube feed pro source x 2 daily, Danactive x 2 daily    Patient reports: pt is non verbal, spoke to  at bedside, metamucil got clogged in the tube, therefore it was discontinued, PCA reports BM"S being pasty at this time. Discussed other tube feed options with colleague and . pt BM'S are better with Glucerna rather than Jevity. Banatrol was given only several months ago.  reports primary care physician does not want pt to receive imodium. if BM's become more frequent and more liquidity consider need to encourage NP to discuss need for imodium with primary care physician.        Enteral /Parenteral Nutrition: Glucerna 1.5 4 cans daily.   Glucerna 1.5 (4 cans daily) provides 1424calories/78grams protein/720g/mlwater) 34kcal/kg and 1.8grams protein/kg. based on IBW of 42kg. provide additional 300ml free water daily.      Daily Weight in k.8 (-29), Weight in k (-28), Weight in k (-27), Weight in k (-26)  % Weight Change: 17% gain since  daily weights questionable    Pertinent Medications: MEDICATIONS  (STANDING):  ALBUTerol    90 MICROgram(s) HFA Inhaler 1 Puff(s) Inhalation every 4 hours  ALBUTerol/ipratropium for Nebulization 3 milliLiter(s) Nebulizer every 6 hours  apixaban 5 milliGRAM(s) Oral every 12 hours  ascorbic acid 500 milliGRAM(s) Oral daily  buDESOnide 160 MICROgram(s)/formoterol 4.5 MICROgram(s) Inhaler 2 Puff(s) Inhalation two times a day  clonazePAM Tablet 1.5 milliGRAM(s) Oral <User Schedule>  ergocalciferol 15908 Unit(s) Oral every week  famotidine    Tablet 20 milliGRAM(s) Oral daily  ferrous    sulfate Liquid 300 milliGRAM(s) Enteral Tube daily  gabapentin   Solution 300 milliGRAM(s) Oral two times a day  hydrocortisone sodium succinate Injectable 100 milliGRAM(s) IV Push every 8 hours  lisinopril 5 milliGRAM(s) Oral daily  Medical Marijuana Oil 1 milliLiter(s) 1 milliLiter(s) Oral <User Schedule>  midodrine 10 milliGRAM(s) Oral every 8 hours  multivitamin 1 Tablet(s) Oral daily  petrolatum white Ointment 1 Application(s) Topical three times a day  QUEtiapine 25 milliGRAM(s) Oral at bedtime  sodium chloride 0.9% Bolus 500 milliLiter(s) IV Bolus once  tiotropium 18 MICROgram(s) Capsule 1 Capsule(s) Inhalation daily  tiZANidine 4 milliGRAM(s) Oral <User Schedule>  vancomycin  IVPB 1000 milliGRAM(s) IV Intermittent every 24 hours    MEDICATIONS  (PRN):  acetaminophen    Suspension .. 650 milliGRAM(s) Oral every 6 hours PRN Temp greater or equal to 38C (100.4F), Mild Pain (1 - 3), Moderate Pain (4 - 6)  Dakins Solution - 1/4 Strength 1 Application(s) Topical daily PRN if dressing soiled  diphenhydrAMINE   Elixir 25 milliGRAM(s) Oral every 6 hours PRN Rash and/or Itching  diphenhydrAMINE   Injectable 50 milliGRAM(s) IV Push once PRN Allergy symptoms  nystatin Powder 1 Application(s) Topical two times a day PRN rash/itchiness  sodium chloride 0.65% Nasal 1 Spray(s) Both Nostrils three times a day PRN Nasal Congestion    Pertinent Labs:  @ 06:36: Na 136, BUN 43<H>, Cr 0.35<L>, <H>, K+ 4.2, Phos 2.2<L>, Mg 1.9, Alk Phos 125<H>, ALT/SGPT 16, AST/SGOT 12    Finger Sticks:  POCT Blood Glucose.: 204 mg/dL ( @ 21:35)  POCT Blood Glucose.: 204 mg/dL ( @ 17:59)      Skin per nursing documentation: sacrum stage 4, stage 3 right hip  Edema: +1 left foot    Estimated Needs:   [x ] no change since previous assessment  [ ] recalculated:     Previous Nutrition Diagnosis: Increased nutrient needs (specify); calories/protein  Nutrition Diagnosis is: ongoing and being addressed with supplements         Interventions: Discussed options related to managing BM's with nurse,  and colleague    Recommend  1)continue Glucerna 1.5 (4 cans daily) provides 1424calories/78grams protein/720g/mlwater) 34kcal/kg and 1.8grams protein/kg. based on IBW of 42kg. provide additional 300ml free water daily.  2)continue Danactive x 2 daily  3)continue tube feed pro source x 2 daily  4)continue multivitamin and VitC daily  5)monitor BM's and need to consider immodium    Monitoring and Evaluation:     Continue to monitor Nutritional intake, Tolerance to diet prescription, weights, labs, skin integrity    RD remains available upon request and will follow up per protocol  Anne Marie Morgan MA, RD, CDN #[phone]

## 2019-03-29 NOTE — CHART NOTE - NSCHARTNOTEFT_GEN_A_CORE
Notified by RN that patient's  is concerned that left knee appears to be more swollen today.   On exam, left knee without erythema however it is edematous when compared to right knee. No effusion or fluctuance noted on exam. Unable to assess ROS as patient is nonverbal at baseline. Patient's  denied any history of gout or left knee surgeries. Patient is bedbound.   Uric acid ordered for AM, r/o gout. Consider imaging of left knee if indicated. Consider rheumatology evaluation if indicated. Will continue to monitor patient and discuss with primary team in AM.       Luis Maria PA-C  Dept of Medicine  [phone]

## 2019-03-29 NOTE — PROGRESS NOTE ADULT - SUBJECTIVE AND OBJECTIVE BOX
---___---___---___---___---___---___ ---___---___---___---___---___---___---___---___---___---                    <<<  M E D I C A L   A T T E N D I N G    F O L L O W    U P   N O T E  >>>    febrile last night today wbc 19.9  on iv vancomycin      ---___---___---___---___---___---      <<<  MEDICATIONS:  >>>    MEDICATIONS  (STANDING):  ALBUTerol    90 MICROgram(s) HFA Inhaler 1 Puff(s) Inhalation every 4 hours  ALBUTerol/ipratropium for Nebulization 3 milliLiter(s) Nebulizer every 6 hours  apixaban 5 milliGRAM(s) Oral every 12 hours  ascorbic acid 500 milliGRAM(s) Oral daily  buDESOnide 160 MICROgram(s)/formoterol 4.5 MICROgram(s) Inhaler 2 Puff(s) Inhalation two times a day  clonazePAM Tablet 1.5 milliGRAM(s) Oral <User Schedule>  ergocalciferol 54987 Unit(s) Oral every week  famotidine    Tablet 20 milliGRAM(s) Oral daily  ferrous    sulfate Liquid 300 milliGRAM(s) Enteral Tube daily  gabapentin   Solution 300 milliGRAM(s) Oral two times a day  hydrocortisone sodium succinate Injectable 100 milliGRAM(s) IV Push every 8 hours  lisinopril 5 milliGRAM(s) Oral daily  Medical Marijuana Oil 1 milliLiter(s) 1 milliLiter(s) Oral <User Schedule>  midodrine 10 milliGRAM(s) Oral every 8 hours  multivitamin 1 Tablet(s) Oral daily  petrolatum white Ointment 1 Application(s) Topical three times a day  QUEtiapine 25 milliGRAM(s) Oral at bedtime  sodium chloride 0.9% Bolus 500 milliLiter(s) IV Bolus once  tiotropium 18 MICROgram(s) Capsule 1 Capsule(s) Inhalation daily  tiZANidine 4 milliGRAM(s) Oral <User Schedule>  vancomycin  IVPB 1000 milliGRAM(s) IV Intermittent every 24 hours      MEDICATIONS  (PRN):  acetaminophen    Suspension .. 650 milliGRAM(s) Oral every 6 hours PRN Temp greater or equal to 38C (100.4F), Mild Pain (1 - 3), Moderate Pain (4 - 6)  Dakins Solution - 1/4 Strength 1 Application(s) Topical daily PRN if dressing soiled  diphenhydrAMINE   Elixir 25 milliGRAM(s) Oral every 6 hours PRN Rash and/or Itching  diphenhydrAMINE   Injectable 50 milliGRAM(s) IV Push once PRN Allergy symptoms  nystatin Powder 1 Application(s) Topical two times a day PRN rash/itchiness  sodium chloride 0.65% Nasal 1 Spray(s) Both Nostrils three times a day PRN Nasal Congestion       ---___---___---___---___---___---     <<<REVIEW OF SYSTEM: >>>    unable to obtain      ---___---___---___---___---___---          <<<  VITAL SIGNS: >>>    T(F): 97.8 (19 @ 06:04), Max: 101 (19 @ 21:00)  HR: 110 (19 @ 06:04) (110 - 120)  BP: 126/72 (19 @ 06:04) (126/72 - 151/84)  RR: 19 (19 @ 06:04) (18 - 19)  SpO2: 97% (19 @ 06:04) (96% - 97%)  Wt(kg): --  CAPILLARY BLOOD GLUCOSE      POCT Blood Glucose.: 204 mg/dL (28 Mar 2019 21:35)    I&O's Summary    28 Mar 2019 07:01  -  29 Mar 2019 07:00  --------------------------------------------------------  IN: 1143 mL / OUT: 800 mL / NET: 343 mL         ---___---___---___---___---___---                       PHYSICAL EXAM:    GEN: A&O X 0 , NAD , comfortable  HEENT: NCAT, PERRL, MMM, no scleral icterus, hearing intact  NECK: Supple, No JVD  CVS: S1S2 , regular , No M/R/G appreciated  PULM: CTA B/L,  no W/R/R appreciated  ABD.: soft. non tender, non distended,  bowel sounds present peg noted   Extrem: intact pulses , no edema noted  Derm: sacral decubitus noted   PSYCH ,  depression,  anxious     ---___---___---___---___---___---     <<<  LAB AND IMAGING: >>>                          7.8    19.9  )-----------( 481      ( 29 Mar 2019 06:37 )             23.0                   136  |  98  |  43<H>  ----------------------------<  188<H>  4.2   |  25  |  0.35<L>    Ca    8.8      29 Mar 2019 06:36  Phos  2.2       Mg     1.9         TPro  5.6<L>  /  Alb  3.0<L>  /  TBili  0.2  /  DBili  x   /  AST  12  /  ALT  16  /  AlkPhos  125<H>             Urinalysis Basic - ( 28 Mar 2019 23:38 )    Color: Yellow / Appearance: Clear / S.026 / pH: x  Gluc: x / Ketone: Negative  / Bili: Negative / Urobili: Negative   Blood: x / Protein: 30 mg/dL / Nitrite: Negative   Leuk Esterase: Negative / RBC: 3 /hpf / WBC 5 /HPF   Sq Epi: x / Non Sq Epi: 1 /hpf / Bacteria: Negative                        [All pertinent / recent available Imaging reports and other labs reviewed]     ---___---___---___---___---___---___ ---___---___---___---___---           <<<  A S S E S S M E N T   A N D   P L A N :  >>>  sepsis continue iv abx  awaiting id reccomendations  chronic hip infection  as above agree if needed po zosyn   agitation  on seroquel    dementia  supportive carte   chronic pain  on medical marijuana and gabpentin  contractures continue with tizanidine  asthma on budesonide  h/o dvt of apixaban  chf on lisinopril             -GI/DVT Prophylaxis.    --------------------------------------------  Case discussed with   Education given on     >>______________________<<      Deniz Bolden .         phone   [phone]

## 2019-03-29 NOTE — CHART NOTE - NSCHARTNOTEFT_GEN_A_CORE
as per nurse pt did not receive any items due at breakfast this morning including 4 cans of Glucerna 1.5, tube feed pro source x 2 and Danactive 1 at breakfast. spoke to Diet Office to investigate and correct. adjusted items in [facility]ORD as needed.

## 2019-03-30 LAB
-  COAGULASE NEGATIVE STAPHYLOCOCCUS: SIGNIFICANT CHANGE UP
CULTURE RESULTS: NO GROWTH — SIGNIFICANT CHANGE UP
CULTURE RESULTS: SIGNIFICANT CHANGE UP
CULTURE RESULTS: SIGNIFICANT CHANGE UP
GRAM STN FLD: SIGNIFICANT CHANGE UP
HCT VFR BLD CALC: 22.4 % — LOW (ref 34.5–45)
HGB BLD-MCNC: 7.4 G/DL — LOW (ref 11.5–15.5)
MCHC RBC-ENTMCNC: 30.2 PG — SIGNIFICANT CHANGE UP (ref 27–34)
MCHC RBC-ENTMCNC: 33.2 GM/DL — SIGNIFICANT CHANGE UP (ref 32–36)
MCV RBC AUTO: 90.7 FL — SIGNIFICANT CHANGE UP (ref 80–100)
METHOD TYPE: SIGNIFICANT CHANGE UP
PLATELET # BLD AUTO: 489 K/UL — HIGH (ref 150–400)
RBC # BLD: 2.46 M/UL — LOW (ref 3.8–5.2)
RBC # FLD: 15.7 % — HIGH (ref 10.3–14.5)
SPECIMEN SOURCE: SIGNIFICANT CHANGE UP
URATE SERPL-MCNC: 4.7 MG/DL — SIGNIFICANT CHANGE UP (ref 2.5–7)
WBC # BLD: 25.1 K/UL — HIGH (ref 3.8–10.5)
WBC # FLD AUTO: 25.1 K/UL — HIGH (ref 3.8–10.5)

## 2019-03-30 PROCEDURE — 99231 SBSQ HOSP IP/OBS SF/LOW 25: CPT

## 2019-03-30 PROCEDURE — 99232 SBSQ HOSP IP/OBS MODERATE 35: CPT

## 2019-03-30 RX ORDER — ACETAMINOPHEN 500 MG
500 TABLET ORAL ONCE
Qty: 0 | Refills: 0 | Status: COMPLETED | OUTPATIENT
Start: 2019-03-30 | End: 2019-03-30

## 2019-03-30 RX ORDER — METOPROLOL TARTRATE 50 MG
12.5 TABLET ORAL DAILY
Qty: 0 | Refills: 0 | Status: DISCONTINUED | OUTPATIENT
Start: 2019-03-30 | End: 2019-03-31

## 2019-03-30 RX ORDER — ACETAMINOPHEN 500 MG
1000 TABLET ORAL ONCE
Qty: 0 | Refills: 0 | Status: DISCONTINUED | OUTPATIENT
Start: 2019-03-30 | End: 2019-03-30

## 2019-03-30 RX ADMIN — Medication 300 MILLIGRAM(S): at 12:08

## 2019-03-30 RX ADMIN — Medication 1 APPLICATION(S): at 22:14

## 2019-03-30 RX ADMIN — Medication 1.5 MILLIGRAM(S): at 21:20

## 2019-03-30 RX ADMIN — APIXABAN 5 MILLIGRAM(S): 2.5 TABLET, FILM COATED ORAL at 21:19

## 2019-03-30 RX ADMIN — BUDESONIDE AND FORMOTEROL FUMARATE DIHYDRATE 2 PUFF(S): 160; 4.5 AEROSOL RESPIRATORY (INHALATION) at 08:41

## 2019-03-30 RX ADMIN — Medication 3 MILLILITER(S): at 16:40

## 2019-03-30 RX ADMIN — TIZANIDINE 4 MILLIGRAM(S): 4 TABLET ORAL at 08:43

## 2019-03-30 RX ADMIN — Medication 1 APPLICATION(S): at 16:37

## 2019-03-30 RX ADMIN — Medication 3 MILLILITER(S): at 05:49

## 2019-03-30 RX ADMIN — Medication 1 APPLICATION(S): at 23:07

## 2019-03-30 RX ADMIN — Medication 500 MILLIGRAM(S): at 12:07

## 2019-03-30 RX ADMIN — LISINOPRIL 5 MILLIGRAM(S): 2.5 TABLET ORAL at 05:50

## 2019-03-30 RX ADMIN — GABAPENTIN 300 MILLIGRAM(S): 400 CAPSULE ORAL at 12:08

## 2019-03-30 RX ADMIN — Medication 1 APPLICATION(S): at 05:58

## 2019-03-30 RX ADMIN — Medication 100 MILLIGRAM(S): at 05:49

## 2019-03-30 RX ADMIN — GABAPENTIN 300 MILLIGRAM(S): 400 CAPSULE ORAL at 22:58

## 2019-03-30 RX ADMIN — Medication 3 MILLILITER(S): at 01:49

## 2019-03-30 RX ADMIN — Medication 200 MILLIGRAM(S): at 23:19

## 2019-03-30 RX ADMIN — TIZANIDINE 4 MILLIGRAM(S): 4 TABLET ORAL at 21:19

## 2019-03-30 RX ADMIN — Medication 3 MILLILITER(S): at 23:04

## 2019-03-30 RX ADMIN — FAMOTIDINE 20 MILLIGRAM(S): 10 INJECTION INTRAVENOUS at 12:07

## 2019-03-30 RX ADMIN — APIXABAN 5 MILLIGRAM(S): 2.5 TABLET, FILM COATED ORAL at 08:42

## 2019-03-30 RX ADMIN — Medication 100 MILLIGRAM(S): at 21:38

## 2019-03-30 RX ADMIN — Medication 100 MILLIGRAM(S): at 16:39

## 2019-03-30 RX ADMIN — Medication 250 MILLIGRAM(S): at 12:03

## 2019-03-30 RX ADMIN — Medication 25 MILLIGRAM(S): at 08:40

## 2019-03-30 RX ADMIN — Medication 3 MILLILITER(S): at 12:07

## 2019-03-30 RX ADMIN — QUETIAPINE FUMARATE 25 MILLIGRAM(S): 200 TABLET, FILM COATED ORAL at 21:19

## 2019-03-30 RX ADMIN — Medication 1 TABLET(S): at 12:07

## 2019-03-30 NOTE — PROGRESS NOTE ADULT - ASSESSMENT
68 yr old with advanced dementia , bed bound, contracted, cva, sp removal of infected hip due to mrsa with placement of space last fall.   Pt has been havening fevers for months.  I suspect there is ongoing Om of the hip site but changing the spacer is not a realistic option . chronic Om of the sacrum usually does not cause fevers and does not need prolonged ab therapy .       cultures negative to date  uc with yeast of doubtful significance    vanco to cover mrsa  can dc meropenem  after cultures , we cac decide on endpoint of ab therapy   discussed  with      reluctant to rescan   does not want mri  temp is down on vanco    if it stays down , we will need to place picc and give her a course  of vanco even though it will  not cure a chronic hip infection    or discharge on po zyvoxx  discussed with   but there are potential drug interactions that may be problematic  temps down but wbc up  discussed with  in detail   wbc and temp up lasr 24 hr  to continue to observe    no good options

## 2019-03-30 NOTE — CHART NOTE - NSCHARTNOTEFT_GEN_A_CORE
[id number]  MYRIAM HEATH    Notified by RN patient with critical lab result of "Growth in anaerobic bottle: Gram Positive Cocci in Clusters." Unable to assess ROS secondary to patient's mental status, she is nonverbal at baseline.     Vital Signs Last 24 Hrs  T(C): 37.1 (30 Mar 2019 04:42), Max: 37.3 (29 Mar 2019 14:47)  T(F): 98.8 (30 Mar 2019 04:42), Max: 99.1 (29 Mar 2019 14:47)  HR: 114 (30 Mar 2019 04:42) (114 - 120)  BP: 134/76 (30 Mar 2019 04:42) (123/57 - 138/74)  RR: 18 (30 Mar 2019 04:42) (18 - 19)  SpO2: 96% (30 Mar 2019 04:42) (95% - 98%)      Physical Exam:  General: Chronically ill-appearing, cachectic woman laying in bed, contracted, NAD, alert and awake  Head:  NC/AT  Skin: (+) warm, dry       Culture - Blood (collected 29 Mar 2019 03:53)  Source: .Blood Blood-Peripheral  Gram Stain (30 Mar 2019 05:31):    Growth in anaerobic bottle: Gram Positive Cocci in Clusters  Preliminary Report (30 Mar 2019 05:31):    Growth in anaerobic bottle: Gram Positive Cocci in Clusters    "Due to technical problems, Proteus sp. will Not be reported as part of    the BCID panel until further notice"    ***Blood Panel PCR results on this specimen are available    approximately 3 hours after the Gram stain result.***    Gram stain, PCR, and/or culture results may not always    correspond due to difference in methodologies.    ************************************************************    This PCR assay was performed using [employer].    The following targets are tested for: Enterococcus,    vancomycin resistant enterococci, Listeria monocytogenes,    coagulase negative staphylococci, S. aureus,    methicillin resistant S. aureus, Streptococcus agalactiae    (Group B), S. pneumoniae, S. pyogenes (Group A),    Acinetobacter baumannii, Enterobacter cloacae, E. coli,    Klebsiella oxytoca, K. pneumoniae, Proteus sp.,    Serratia marcescens, Haemophilus influenzae,    Neisseria meningitidis, Pseudomonas aeruginosa, Candida    albicans, C. glabrata, C krusei, C parapsilosis,    C. tropicalis and the KPC resistance gene.    Culture - Blood (collected 29 Mar 2019 03:53)  Source: .Blood Blood-Peripheral  Preliminary Report (30 Mar 2019 04:00):    No growth to date.      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 History was provided by patient's daughters, Tere and Anige at bedside. Patient has been having persistent fever at home up to 103F. She has appeared more lethargic for the last 2 days and has had labored breathing, but no cough. Patient gets regular wound care but has persistent ulcers. Because of persistent fevers, pt was brought to ED. (24 Mar 2019 20:54)  Patient now found to have positive blood cultures in anaerobic bottle with GPC in clusters.       #Bacteremia- ?contaminant  -Labs with uptrending leukocytosis, patient afebrile overnight  -Continue with vancomycin  -Follow up C&S  -Repeat blood cultures ordered  -Monitor and trend CBC  -Monitor vanco levels  -ID is following patient   -Will continue to closely monitor patient/vitals and discuss with primary team this AM      Luis Maria PA-C  Dept of Medicine  [phone]

## 2019-03-30 NOTE — PROGRESS NOTE ADULT - SUBJECTIVE AND OBJECTIVE BOX
infectious diseases progress note:    Patient is a 68y old  Female who presents with a chief complaint of fevers (29 Mar 2019 07:53)        Fever        R   Allergies    ASA; dye contrast (Anaphylaxis)  aspirin (Short breath)  divalproex sodium (Other (Unknown))  Haldol (Other (Unknown))  penicillin (Short breath; Rash)  sulfa drugs (Short breath; Rash)  vancomycin (Rash; Urticaria; Hives)  Xanax (Other (Unknown))    Intolerances        ANTIBIOTICS/RELEVANT:  antimicrobials  vancomycin  IVPB 1000 milliGRAM(s) IV Intermittent every 24 hours    immunologic:    OTHER:  acetaminophen    Suspension .. 650 milliGRAM(s) Oral every 6 hours PRN  ALBUTerol    90 MICROgram(s) HFA Inhaler 1 Puff(s) Inhalation every 4 hours  ALBUTerol/ipratropium for Nebulization 3 milliLiter(s) Nebulizer every 6 hours  apixaban 5 milliGRAM(s) Oral every 12 hours  ascorbic acid 500 milliGRAM(s) Oral daily  buDESOnide 160 MICROgram(s)/formoterol 4.5 MICROgram(s) Inhaler 2 Puff(s) Inhalation two times a day  clonazePAM Tablet 1.5 milliGRAM(s) Oral <User Schedule>  Dakins Solution - 1/4 Strength 1 Application(s) Topical daily PRN  diphenhydrAMINE   Elixir 25 milliGRAM(s) Oral every 6 hours PRN  ergocalciferol 36718 Unit(s) Oral every week  famotidine    Tablet 20 milliGRAM(s) Oral daily  ferrous    sulfate Liquid 300 milliGRAM(s) Enteral Tube daily  gabapentin   Solution 300 milliGRAM(s) Oral two times a day  hydrocortisone sodium succinate Injectable 100 milliGRAM(s) IV Push every 8 hours  lisinopril 5 milliGRAM(s) Oral daily  Medical Marijuana Oil 1 milliLiter(s) 1 milliLiter(s) Oral <User Schedule>  midodrine 10 milliGRAM(s) Oral every 8 hours  multivitamin 1 Tablet(s) Oral daily  nystatin Powder 1 Application(s) Topical two times a day PRN  petrolatum white Ointment 1 Application(s) Topical three times a day  QUEtiapine 25 milliGRAM(s) Oral at bedtime  sodium chloride 0.65% Nasal 1 Spray(s) Both Nostrils three times a day PRN  sodium chloride 0.9% Bolus 500 milliLiter(s) IV Bolus once  tiotropium 18 MICROgram(s) Capsule 1 Capsule(s) Inhalation daily  tiZANidine 4 milliGRAM(s) Oral <User Schedule>      Objective:  Vital Signs Last 24 Hrs  T(C): 37.1 (30 Mar 2019 04:42), Max: 37.3 (29 Mar 2019 14:47)  T(F): 98.8 (30 Mar 2019 04:42), Max: 99.1 (29 Mar 2019 14:47)  HR: 114 (30 Mar 2019 04:42) (114 - 120)  BP: 134/76 (30 Mar 2019 04:42) (123/57 - 138/74)  BP(mean): --  RR: 18 (30 Mar 2019 04:42) (18 - 19)  SpO2: 96% (30 Mar 2019 04:42) (95% - 98%)     Eyes:JACQUELIN, EOMI  Ear/Nose/Throat: no oral lesion, no sinus tenderness on percussion	  Neck:no JVD, no lymphadenopathy, supple  Respiratory: CTA maryjane  Cardiovascular: S1S2 RRR, no murmurs  Gastrointestinal:soft, (+) BS, no HSM  Extremities:no e/e/c        LABS:                        7.4    25.1  )-----------( 489      ( 30 Mar 2019 06:30 )             22.4         136  |  98  |  43<H>  ----------------------------<  188<H>  4.2   |  25  |  0.35<L>    Ca    8.8      29 Mar 2019 06:36  Phos  2.2       Mg     1.9         TPro  5.6<L>  /  Alb  3.0<L>  /  TBili  0.2  /  DBili  x   /  AST  12  /  ALT  16  /  AlkPhos  125<H>        Urinalysis Basic - ( 28 Mar 2019 23:38 )    Color: Yellow / Appearance: Clear / S.026 / pH: x  Gluc: x / Ketone: Negative  / Bili: Negative / Urobili: Negative   Blood: x / Protein: 30 mg/dL / Nitrite: Negative   Leuk Esterase: Negative / RBC: 3 /hpf / WBC 5 /HPF   Sq Epi: x / Non Sq Epi: 1 /hpf / Bacteria: Negative          MICROBIOLOGY:    RECENT CULTURES:   @ 03:53 .Blood Blood-Peripheral   PCR    Growth in anaerobic bottle: Gram Positive Cocci in Clusters    Blood Culture PCR  Blood Culture PCR     No growth to date.     @ 03:17 .Urine Catheterized                No growth     @ 13:16 .Stool Feces                GI PCR Results: NOT detected  *******Please Note:*******  GI panel PCR evaluates for:  Campylobacter, Plesiomonas shigelloides, Salmonella,  Vibrio, Yersinia enterocolitica, Enteroaggregative  Escherichia coli (EAEC), Enteropathogenic E.coli (EPEC),  Enterotoxigenic E. coli (ETEC) lt/st, Shiga-like  toxin-producing E. coli (STEC) stx1/stx2,  Shigella/ Enteroinvasive E. coli (EIEC), Cryptosporidium,  Cyclospora cayetanensis, Entamoeba histolytica,  Giardia lamblia, Adenovirus F 40/41, Astrovirus,  Norovirus GI/GII, Rotavirus A, Sapovirus     @ 00:14 .Urine Catheterized                10,000 - 49,000 CFU/mL Yeast-like cells, presumptively not Candida  albicans     @ 23:42 .Blood Blood-Peripheral                No growth at 5 days.     @ 23:40 .Blood Blood-Venous                No growth at 5 days.          RESPIRATORY CULTURES:      Clostridium difficile [facility] Toxins A&amp;B, EIA:   Negative ( @ 07:49)          RADIOLOGY & ADDITIONAL STUDIES:        Pager [phone]  After 5 pm/weekends or if no response :[phone]

## 2019-03-30 NOTE — PROGRESS NOTE ADULT - SUBJECTIVE AND OBJECTIVE BOX
---___---___---___---___---___---___ ---___---___---___---___---___---___---___---___---___---                    <<<  M E D I C A L   A T T E N D I N G    F O L L O W    U P   N O T E  >>>  patient seems slightly more lethargic but otherwise no changes   ---___---___---___---___---___---      <<<  MEDICATIONS:  >>>    MEDICATIONS  (STANDING):  ALBUTerol    90 MICROgram(s) HFA Inhaler 1 Puff(s) Inhalation every 4 hours  ALBUTerol/ipratropium for Nebulization 3 milliLiter(s) Nebulizer every 6 hours  apixaban 5 milliGRAM(s) Oral every 12 hours  ascorbic acid 500 milliGRAM(s) Oral daily  buDESOnide 160 MICROgram(s)/formoterol 4.5 MICROgram(s) Inhaler 2 Puff(s) Inhalation two times a day  clonazePAM Tablet 1.5 milliGRAM(s) Oral <User Schedule>  ergocalciferol 88117 Unit(s) Oral every week  famotidine    Tablet 20 milliGRAM(s) Oral daily  ferrous    sulfate Liquid 300 milliGRAM(s) Enteral Tube daily  gabapentin   Solution 300 milliGRAM(s) Oral two times a day  hydrocortisone sodium succinate Injectable 100 milliGRAM(s) IV Push every 8 hours  Medical Marijuana Oil 1 milliLiter(s) 1 milliLiter(s) Oral <User Schedule>  metoprolol succinate ER 12.5 milliGRAM(s) Oral daily  midodrine 10 milliGRAM(s) Oral every 8 hours  multivitamin 1 Tablet(s) Oral daily  petrolatum white Ointment 1 Application(s) Topical three times a day  QUEtiapine 25 milliGRAM(s) Oral at bedtime  sodium chloride 0.9% Bolus 500 milliLiter(s) IV Bolus once  tiotropium 18 MICROgram(s) Capsule 1 Capsule(s) Inhalation daily  tiZANidine 4 milliGRAM(s) Oral <User Schedule>  vancomycin  IVPB 1000 milliGRAM(s) IV Intermittent every 24 hours      MEDICATIONS  (PRN):  acetaminophen    Suspension .. 650 milliGRAM(s) Oral every 6 hours PRN Temp greater or equal to 38C (100.4F), Mild Pain (1 - 3), Moderate Pain (4 - 6)  Dakins Solution - 1/4 Strength 1 Application(s) Topical daily PRN if dressing soiled  diphenhydrAMINE   Elixir 25 milliGRAM(s) Oral every 6 hours PRN Rash and/or Itching  nystatin Powder 1 Application(s) Topical two times a day PRN rash/itchiness  sodium chloride 0.65% Nasal 1 Spray(s) Both Nostrils three times a day PRN Nasal Congestion       ---___---___---___---___---___---     <<<REVIEW OF SYSTEM: >>>    unable to obtain      ---___---___---___---___---___---          <<<  VITAL SIGNS: >>>    T(F): 99.8 (19 @ 12:30), Max: 99.8 (19 @ 12:30)  HR: 107 (19 @ 12:30) (107 - 119)  BP: 146/78 (19 @ 12:30) (123/57 - 146/78)  RR: 18 (19 @ 12:30) (18 - 19)  SpO2: 96% (19 @ 12:30) (95% - 96%)  Wt(kg): --  CAPILLARY BLOOD GLUCOSE      POCT Blood Glucose.: 204 mg/dL (28 Mar 2019 21:35)    I&O's Summary    29 Mar 2019 07:  -  30 Mar 2019 07:00  --------------------------------------------------------  IN: 390 mL / OUT: 700 mL / NET: -310 mL    30 Mar 2019 07:01  -  30 Mar 2019 15:46  --------------------------------------------------------  IN: 0 mL / OUT: 800 mL / NET: -800 mL         ---___---___---___---___---___---                       PHYSICAL EXAM:    GEN: A&O X  , NAD , comfortable  HEENT: NCAT, PERRL, MMM, no scleral icterus, hearing intact  NECK: Supple, No JVD  CVS: S1S2 , regular , No M/R/G appreciated  PULM: CTA B/L,  no W/R/R appreciated  ABD.: soft. non tender, non distended,  bowel sounds present peg noted   Extrem: intact pulses , no edema noted  Derm: No rash or ecchymosis noted        ---___---___---___---___---___---     <<<  LAB AND IMAGING: >>>                          7.4    25.1  )-----------( 489      ( 30 Mar 2019 06:30 )             22.4                   136  |  98  |  43<H>  ----------------------------<  188<H>  4.2   |  25  |  0.35<L>    Ca    8.8      29 Mar 2019 06:36  Phos  2.2       Mg     1.9         TPro  5.6<L>  /  Alb  3.0<L>  /  TBili  0.2  /  DBili  x   /  AST  12  /  ALT  16  /  AlkPhos  125<H>             Urinalysis Basic - ( 28 Mar 2019 23:38 )    Color: Yellow / Appearance: Clear / S.026 / pH: x  Gluc: x / Ketone: Negative  / Bili: Negative / Urobili: Negative   Blood: x / Protein: 30 mg/dL / Nitrite: Negative   Leuk Esterase: Negative / RBC: 3 /hpf / WBC 5 /HPF   Sq Epi: x / Non Sq Epi: 1 /hpf / Bacteria: Negative                        [All pertinent / recent available Imaging reports and other labs reviewed]     ---___---___---___---___---___---___ ---___---___---___---___---           <<<  A S S E S S M E N T   A N D   P L A N :  >>>    sepsis  id note appreciated  dementia supportive care   chronic vertebral fracture  on medical marijuana  agitation on seroquel and klonopin   asthma contiue budesonide  chf spoe to dr le noriega lisinopril low dose toprol xl for rate control       -GI/DVT Prophylaxis.    --------------------------------------------  Case discussed with   Education given on     >>______________________<<      Deniz Bolden .         phone   [phone]

## 2019-03-31 LAB
ANION GAP SERPL CALC-SCNC: 17 MMOL/L — SIGNIFICANT CHANGE UP (ref 5–17)
BUN SERPL-MCNC: 43 MG/DL — HIGH (ref 7–23)
CALCIUM SERPL-MCNC: 8.3 MG/DL — LOW (ref 8.4–10.5)
CHLORIDE SERPL-SCNC: 99 MMOL/L — SIGNIFICANT CHANGE UP (ref 96–108)
CO2 SERPL-SCNC: 24 MMOL/L — SIGNIFICANT CHANGE UP (ref 22–31)
CREAT SERPL-MCNC: 0.35 MG/DL — LOW (ref 0.5–1.3)
CULTURE RESULTS: SIGNIFICANT CHANGE UP
GLUCOSE SERPL-MCNC: 259 MG/DL — HIGH (ref 70–99)
HCT VFR BLD CALC: 23.6 % — LOW (ref 34.5–45)
HGB BLD-MCNC: 7.7 G/DL — LOW (ref 11.5–15.5)
LACTATE SERPL-SCNC: 4 MMOL/L — CRITICAL HIGH (ref 0.7–2)
LACTATE SERPL-SCNC: 5.9 MMOL/L — CRITICAL HIGH (ref 0.7–2)
MCHC RBC-ENTMCNC: 29.6 PG — SIGNIFICANT CHANGE UP (ref 27–34)
MCHC RBC-ENTMCNC: 32.6 GM/DL — SIGNIFICANT CHANGE UP (ref 32–36)
MCV RBC AUTO: 90.8 FL — SIGNIFICANT CHANGE UP (ref 80–100)
ORGANISM # SPEC MICROSCOPIC CNT: SIGNIFICANT CHANGE UP
ORGANISM # SPEC MICROSCOPIC CNT: SIGNIFICANT CHANGE UP
PLATELET # BLD AUTO: 517 K/UL — HIGH (ref 150–400)
POTASSIUM SERPL-MCNC: 3.3 MMOL/L — LOW (ref 3.5–5.3)
POTASSIUM SERPL-SCNC: 3.3 MMOL/L — LOW (ref 3.5–5.3)
RBC # BLD: 2.6 M/UL — LOW (ref 3.8–5.2)
RBC # FLD: 16.6 % — HIGH (ref 10.3–14.5)
SODIUM SERPL-SCNC: 140 MMOL/L — SIGNIFICANT CHANGE UP (ref 135–145)
SPECIMEN SOURCE: SIGNIFICANT CHANGE UP
WBC # BLD: 24.9 K/UL — HIGH (ref 3.8–10.5)
WBC # FLD AUTO: 24.9 K/UL — HIGH (ref 3.8–10.5)

## 2019-03-31 PROCEDURE — 99232 SBSQ HOSP IP/OBS MODERATE 35: CPT

## 2019-03-31 PROCEDURE — 71045 X-RAY EXAM CHEST 1 VIEW: CPT | Mod: 26

## 2019-03-31 RX ORDER — METOPROLOL TARTRATE 50 MG
12.5 TABLET ORAL
Qty: 0 | Refills: 0 | Status: DISCONTINUED | OUTPATIENT
Start: 2019-03-31 | End: 2019-04-18

## 2019-03-31 RX ORDER — ACETAMINOPHEN 500 MG
500 TABLET ORAL ONCE
Qty: 0 | Refills: 0 | Status: COMPLETED | OUTPATIENT
Start: 2019-03-31 | End: 2019-03-31

## 2019-03-31 RX ORDER — IPRATROPIUM/ALBUTEROL SULFATE 18-103MCG
3 AEROSOL WITH ADAPTER (GRAM) INHALATION ONCE
Qty: 0 | Refills: 0 | Status: COMPLETED | OUTPATIENT
Start: 2019-03-31 | End: 2019-03-31

## 2019-03-31 RX ORDER — LISINOPRIL 2.5 MG/1
5 TABLET ORAL AT BEDTIME
Qty: 0 | Refills: 0 | Status: DISCONTINUED | OUTPATIENT
Start: 2019-03-31 | End: 2019-04-18

## 2019-03-31 RX ORDER — POTASSIUM CHLORIDE 20 MEQ
40 PACKET (EA) ORAL ONCE
Qty: 0 | Refills: 0 | Status: COMPLETED | OUTPATIENT
Start: 2019-03-31 | End: 2019-03-31

## 2019-03-31 RX ORDER — MEROPENEM 1 G/30ML
1000 INJECTION INTRAVENOUS EVERY 12 HOURS
Qty: 0 | Refills: 0 | Status: DISCONTINUED | OUTPATIENT
Start: 2019-03-31 | End: 2019-04-03

## 2019-03-31 RX ORDER — SODIUM CHLORIDE 9 MG/ML
1000 INJECTION INTRAMUSCULAR; INTRAVENOUS; SUBCUTANEOUS
Qty: 0 | Refills: 0 | Status: DISCONTINUED | OUTPATIENT
Start: 2019-03-31 | End: 2019-03-31

## 2019-03-31 RX ADMIN — Medication 40 MILLIEQUIVALENT(S): at 04:21

## 2019-03-31 RX ADMIN — Medication 500 MILLIGRAM(S): at 02:35

## 2019-03-31 RX ADMIN — Medication 500 MILLIGRAM(S): at 00:47

## 2019-03-31 RX ADMIN — Medication 1.5 MILLIGRAM(S): at 21:01

## 2019-03-31 RX ADMIN — Medication 1 TABLET(S): at 08:35

## 2019-03-31 RX ADMIN — Medication 1 APPLICATION(S): at 21:03

## 2019-03-31 RX ADMIN — BUDESONIDE AND FORMOTEROL FUMARATE DIHYDRATE 2 PUFF(S): 160; 4.5 AEROSOL RESPIRATORY (INHALATION) at 08:35

## 2019-03-31 RX ADMIN — TIZANIDINE 4 MILLIGRAM(S): 4 TABLET ORAL at 21:01

## 2019-03-31 RX ADMIN — Medication 3 MILLILITER(S): at 21:02

## 2019-03-31 RX ADMIN — TIZANIDINE 4 MILLIGRAM(S): 4 TABLET ORAL at 08:35

## 2019-03-31 RX ADMIN — Medication 12.5 MILLIGRAM(S): at 08:35

## 2019-03-31 RX ADMIN — LISINOPRIL 5 MILLIGRAM(S): 2.5 TABLET ORAL at 22:19

## 2019-03-31 RX ADMIN — Medication 650 MILLIGRAM(S): at 23:36

## 2019-03-31 RX ADMIN — Medication 250 MILLIGRAM(S): at 10:41

## 2019-03-31 RX ADMIN — FAMOTIDINE 20 MILLIGRAM(S): 10 INJECTION INTRAVENOUS at 08:35

## 2019-03-31 RX ADMIN — BUDESONIDE AND FORMOTEROL FUMARATE DIHYDRATE 2 PUFF(S): 160; 4.5 AEROSOL RESPIRATORY (INHALATION) at 21:02

## 2019-03-31 RX ADMIN — MEROPENEM 100 MILLIGRAM(S): 1 INJECTION INTRAVENOUS at 17:26

## 2019-03-31 RX ADMIN — Medication 3 MILLILITER(S): at 06:23

## 2019-03-31 RX ADMIN — Medication 1 APPLICATION(S): at 12:55

## 2019-03-31 RX ADMIN — Medication 500 MILLIGRAM(S): at 08:35

## 2019-03-31 RX ADMIN — Medication 200 MILLIGRAM(S): at 01:20

## 2019-03-31 RX ADMIN — Medication 3 MILLILITER(S): at 16:31

## 2019-03-31 RX ADMIN — Medication 1 APPLICATION(S): at 12:58

## 2019-03-31 RX ADMIN — MIDODRINE HYDROCHLORIDE 10 MILLIGRAM(S): 2.5 TABLET ORAL at 21:00

## 2019-03-31 RX ADMIN — Medication 100 MILLIGRAM(S): at 06:23

## 2019-03-31 RX ADMIN — Medication 3 MILLILITER(S): at 12:56

## 2019-03-31 RX ADMIN — GABAPENTIN 300 MILLIGRAM(S): 400 CAPSULE ORAL at 10:41

## 2019-03-31 RX ADMIN — SODIUM CHLORIDE 25 MILLILITER(S): 9 INJECTION INTRAMUSCULAR; INTRAVENOUS; SUBCUTANEOUS at 18:48

## 2019-03-31 RX ADMIN — APIXABAN 5 MILLIGRAM(S): 2.5 TABLET, FILM COATED ORAL at 08:35

## 2019-03-31 RX ADMIN — Medication 650 MILLIGRAM(S): at 22:21

## 2019-03-31 RX ADMIN — Medication 1 APPLICATION(S): at 06:20

## 2019-03-31 RX ADMIN — Medication 12.5 MILLIGRAM(S): at 21:01

## 2019-03-31 RX ADMIN — Medication 25 MILLIGRAM(S): at 08:35

## 2019-03-31 RX ADMIN — GABAPENTIN 300 MILLIGRAM(S): 400 CAPSULE ORAL at 21:01

## 2019-03-31 RX ADMIN — Medication 300 MILLIGRAM(S): at 08:35

## 2019-03-31 RX ADMIN — QUETIAPINE FUMARATE 25 MILLIGRAM(S): 200 TABLET, FILM COATED ORAL at 21:01

## 2019-03-31 RX ADMIN — Medication 12.5 MILLIGRAM(S): at 00:44

## 2019-03-31 RX ADMIN — APIXABAN 5 MILLIGRAM(S): 2.5 TABLET, FILM COATED ORAL at 21:01

## 2019-03-31 RX ADMIN — Medication 100 MILLIGRAM(S): at 21:01

## 2019-03-31 RX ADMIN — Medication 100 MILLIGRAM(S): at 13:00

## 2019-03-31 NOTE — PROVIDER CONTACT NOTE (OTHER) - ASSESSMENT
Pt alert but nonverbal. Pt febrile with rectal temp of 102. Pt has had fevers during admission. Last BC with +GPC. Pt on IV vanco. Pt resting in bed; no ss of distress. Pt alert but nonverbal. Pt febrile with rectal temp of 102. Pt has had fevers during admission. Last BC with +GPC. Pt on IV vanco. Pt resting in bed; no ss of distress. /67 [id number]

## 2019-03-31 NOTE — PROVIDER CONTACT NOTE (OTHER) - ASSESSMENT
Patient's Lab Final Culture - Blood from 29-Mar-2019 Final Results: Growth in anaerobic bottle: Coag Negative Staphylococcus. Growth in anaerobic bottle: Gram Positive Cocci in Clusters. Patient has an active order for Vancomycin IVPB 1000 mg every 24 hours.

## 2019-03-31 NOTE — PROGRESS NOTE ADULT - SUBJECTIVE AND OBJECTIVE BOX
Follow Up:      Inverval History/ROS:Patient is a 68y old  Female who presents with a chief complaint of fevers (31 Mar 2019 12:27)    Febrile overnight.    Called to evaluate pt for elevated lactate.    One loose BM- but on tube feeds  Not verbal.        Allergies    ASA; dye contrast (Anaphylaxis)  aspirin (Short breath)  divalproex sodium (Other (Unknown))  Haldol (Other (Unknown))  penicillin (Short breath; Rash)  sulfa drugs (Short breath; Rash)  vancomycin (Rash; Urticaria; Hives)  Xanax (Other (Unknown))    Intolerances        ANTIMICROBIALS:  vancomycin  IVPB 1000 every 24 hours      OTHER MEDS:  acetaminophen    Suspension .. 650 milliGRAM(s) Oral every 6 hours PRN  ALBUTerol    90 MICROgram(s) HFA Inhaler 1 Puff(s) Inhalation every 4 hours  ALBUTerol/ipratropium for Nebulization 3 milliLiter(s) Nebulizer every 6 hours  ALBUTerol/ipratropium for Nebulization 3 milliLiter(s) Nebulizer once  apixaban 5 milliGRAM(s) Oral every 12 hours  ascorbic acid 500 milliGRAM(s) Oral daily  buDESOnide 160 MICROgram(s)/formoterol 4.5 MICROgram(s) Inhaler 2 Puff(s) Inhalation two times a day  clonazePAM Tablet 1.5 milliGRAM(s) Oral <User Schedule>  Dakins Solution - 1/4 Strength 1 Application(s) Topical daily PRN  diphenhydrAMINE   Elixir 25 milliGRAM(s) Oral every 6 hours PRN  ergocalciferol 44211 Unit(s) Oral every week  famotidine    Tablet 20 milliGRAM(s) Oral daily  ferrous    sulfate Liquid 300 milliGRAM(s) Enteral Tube daily  gabapentin   Solution 300 milliGRAM(s) Oral two times a day  hydrocortisone sodium succinate Injectable 100 milliGRAM(s) IV Push every 8 hours  Medical Marijuana Oil 1 milliLiter(s) 1 milliLiter(s) Oral <User Schedule>  metoprolol tartrate 12.5 milliGRAM(s) Enteral Tube two times a day  midodrine 10 milliGRAM(s) Oral every 8 hours  multivitamin 1 Tablet(s) Oral daily  nystatin Powder 1 Application(s) Topical two times a day PRN  petrolatum white Ointment 1 Application(s) Topical three times a day  QUEtiapine 25 milliGRAM(s) Oral at bedtime  sodium chloride 0.65% Nasal 1 Spray(s) Both Nostrils three times a day PRN  sodium chloride 0.9% Bolus 500 milliLiter(s) IV Bolus once  tiotropium 18 MICROgram(s) Capsule 1 Capsule(s) Inhalation daily  tiZANidine 4 milliGRAM(s) Oral <User Schedule>      Vital Signs Last 24 Hrs  T(C): 37.6 (31 Mar 2019 14:50), Max: 38.9 (30 Mar 2019 22:10)  T(F): 99.6 (31 Mar 2019 14:50), Max: 102 (30 Mar 2019 22:10)  HR: 108 (31 Mar 2019 15:46) (89 - 123)  BP: 146/84 (31 Mar 2019 15:46) (130/74 - 156/85)  BP(mean): --  RR: 20 (31 Mar 2019 15:46) (18 - 20)  SpO2: 100% (31 Mar 2019 15:46) (93% - 100%)    PHYSICAL EXAM:  General: [x ]chronically ill  HEAD/EYES: [ ] PERRL [x ] white sclera [ ] icterus  ENT:  [ ] normal x[ ] supple [ ] thrush [ ] pharyngeal exudate  Cardiovascular:   [ ] murmur [ x] normal [ ] PPM/AICD  Respiratory:  [x ] clear to ausculation bilaterally  GI:  [x soft, non-tender, normal bowel sounds  peg in place  :  [x ] gavin [ ] no CVA tenderness   Musculoskeletal:  [ ] no synovitis  Neurologic:  [ ] non-focal exam   Skin:  [ x] sacral decubitus  Lymph: [ ] no lymphadenopathy  Psychiatric:  [ ] appropriate affect [ ] alert & oriented  Lines:  [ x] no phlebitis [ ] central line                                7.7    24.9  )-----------( 517      ( 31 Mar 2019 03:17 )             23.6       03-31    140  |  99  |  43<H>  ----------------------------<  259<H>  3.3<L>   |  24  |  0.35<L>    Ca    8.3<L>      31 Mar 2019 03:17            MICROBIOLOGY:Culture Results:   No growth to date. (03-29-19 @ 03:53)  Culture Results:   Growth in anaerobic bottle: Coag Negative Staphylococcus  Single set isolate, possible contaminant. Contact  Microbiology if susceptibility testing clinically  indicated.  "Due to technical problems, Proteus sp. will Not be reported as part of  theBCID panel until further notice"  ***Blood Panel PCR results on this specimen are available  approximately 3 hours after the Gram stain result.***  Gram stain, PCR, and/or culture results may not always  correspond due to difference in methodologies.  ************************************************************  This PCR assay was performed using [employer].  The following targets are tested for: Enterococcus,  vancomycin resistant enterococci, Listeria monocytogenes,  coagulase negative staphylococci, S. aureus,  methicillin resistant S. aureus, Streptococcus agalactiae  (Group B), S. pneumoniae, S. pyogenes (Group A),  Acinetobacter baumannii, Enterobacter cloacae, E. coli,  Klebsiella oxytoca, K. pneumoniae, Proteus sp.,  Serratia marcescens, Haemophilus influenzae,  Neisseria meningitidis, Pseudomonas aeruginosa, Candida  albicans, C. glabrata, C krusei, C parapsilosis,  C. tropicalis and the KPC resistance gene. (03-29-19 @ 03:53)  Culture Results:   No growth (03-29-19 @ 03:17)  Culture Results:   GI PCR Results: NOT detected  *******Please Note:*******  GI panel PCR evaluates for:  Campylobacter, Plesiomonas shigelloides, Salmonella,  Vibrio, Yersinia enterocolitica, Enteroaggregative  Escherichia coli (EAEC), Enteropathogenic E.coli (EPEC),  Enterotoxigenic E. coli (ETEC) lt/st, Shiga-like  toxin-producing E. coli (STEC) stx1/stx2,  Shigella/ Enteroinvasive E. coli (EIEC), Cryptosporidium,  Cyclospora cayetanensis, Entamoeba histolytica,  Giardia lamblia, Adenovirus F 40/41, Astrovirus,  Norovirus GI/GII, Rotavirus A, Sapovirus (03-25-19 @ 13:16)  Culture Results:   10,000 - 49,000 CFU/mL Yeast-like cells, presumptively not Candida  albicans (03-25-19 @ 00:14)  Culture Results:   No growth at 5 days. (03-24-19 @ 23:42)  Culture Results:   No growth at 5 days. (03-24-19 @ 23:40)      RADIOLOGY:

## 2019-03-31 NOTE — PROGRESS NOTE ADULT - ASSESSMENT
68 year old with dementia with MRSa infection of left hip, with spacer in place, and sacral decubitus    Now with fever and increased WBC.      1) Fever  repeat blood culture  Check a CXR  Repeat UA  IF patient has persistent diarrhea, check C diff    Resume meropenem pending cxr and repeat blood culture    2) Hip infection  C/w vancomycin    Options are limited  For treatment of infection, likely needs an aggressive work up/ possible surgery- which she is a poor candidate for due to her comorbidity.  Discussed this with  and he agrees    3) Sacral decubitus:  Continue wound care

## 2019-03-31 NOTE — CHART NOTE - NSCHARTNOTEFT_GEN_A_CORE
MYRIAM HEATH    Notified by RN patient with critical lab result lactate 4.0 and positive blood culture  growth   seen and examined patient without dyspneic  non toxic appearance      Vital Signs Last 24 Hrs  T(C): 37.6 (31 Mar 2019 14:50), Max: 38.9 (30 Mar 2019 22:10)  T(F): 99.6 (31 Mar 2019 14:50), Max: 102 (30 Mar 2019 22:10)  HR: 108 (31 Mar 2019 15:46) (89 - 123)  BP: 146/84 (31 Mar 2019 15:46) (130/74 - 156/85)  BP(mean): --  RR: 20 (31 Mar 2019 15:46) (18 - 20)  SpO2: 100% (31 Mar 2019 15:46) (93% - 100%)                        7.7    24.9  )-----------( 517      ( 31 Mar 2019 03:17 )             23.6   03-31    140  |  99  |  43<H>  ----------------------------<  259<H>  3.3<L>   |  24  |  0.35<L>    Ca    8.3<L>      31 Mar 2019 03:17    patient Nonverbal   Lung clear b/l no wheeze  CV s1 s2   Abd soft non tender   ext pulses     HPI:  This patient is a 68yoF with PMH of advanced dementia (nonverbal, dysphagia with PEG tube, functional quadriplegic, chronic gavin catheter), sacral stage 4 pressure ulcer, heel pressure ulcers, asthma on chronic prednisone., hLd, CVA, UC (in remission for 30years), 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 History was provided by patient's daughters, Tere and Angie at bedside. Patient has been having persistent fever at home up to 103F. She has appeared more lethargic for the last 2 days and has had labored breathing, but no cough. She has loose nonbloody stools. No recent emesis, melena or hematuria. Urine output via gavin catheter is normal yellow appearance and had "good output." Patient gets regular wound care but has persistent ulcers. Because of persistent fevers, pt was brought to ED.   This patient was previously hospitalized from 2/26-3/18/2019 for fevers, found to have septic shock 2/2 UTI vs sacral ulcer vs PNA, requiring pressor support and MICU stay. 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 Offer was made to change the spacer and get cultures, however at that time, the  did not want to put the patient through surgery. Pt completed a course of meropenem, and was resumed on suppressive minocycline.   In the ED, T 100.8 Rectal  , /63, RR 14, SpO2 98%RA  MEETS SIRS CRITERIA based on HR and temperature.   She was given IVNS 1.5L, acetaminophen 650mg and meropenem 1g. (24 Mar 2019 20:54)      Interventions taken   continue with current antibiotic   spoke with [facility] will see patient   will sign out to night NP   continue to monitor   Dr Bolden awared above plan                         Laura Nettles NP-C [facility]    Notified by RN patient with critical lab result lactate 4.0 and positive blood culture  growth gram positive cocci in cluster   seen and examined patient without dyspneic  non toxic appearance      Vital Signs Last 24 Hrs  T(C): 37.6 (31 Mar 2019 14:50), Max: 38.9 (30 Mar 2019 22:10)  T(F): 99.6 (31 Mar 2019 14:50), Max: 102 (30 Mar 2019 22:10)  HR: 108 (31 Mar 2019 15:46) (89 - 123)  BP: 146/84 (31 Mar 2019 15:46) (130/74 - 156/85)  BP(mean): --  RR: 20 (31 Mar 2019 15:46) (18 - 20)  SpO2: 100% (31 Mar 2019 15:46) (93% - 100%)                        7.7    24.9  )-----------( 517      ( 31 Mar 2019 03:17 )             23.6   03-31    140  |  99  |  43<H>  ----------------------------<  259<H>  3.3<L>   |  24  |  0.35<L>    Ca    8.3<L>      31 Mar 2019 03:17    patient Nonverbal   Lung clear b/l no wheeze  CV s1 s2   Abd soft non tender   ext pulses     HPI:  This patient is a 68yoF with PMH of advanced dementia (nonverbal, dysphagia with PEG tube, functional quadriplegic, chronic gavin catheter), sacral stage 4 pressure ulcer, heel pressure ulcers, asthma on chronic prednisone., hLd, CVA, UC (in remission for 30years), 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 History was provided by patient's daughters, Tere and Angie at bedside. Patient has been having persistent fever at home up to 103F. She has appeared more lethargic for the last 2 days and has had labored breathing, but no cough. She has loose nonbloody stools. No recent emesis, melena or hematuria. Urine output via gavin catheter is normal yellow appearance and had "good output." Patient gets regular wound care but has persistent ulcers. Because of persistent fevers, pt was brought to ED.   This patient was previously hospitalized from 2/26-3/18/2019 for fevers, found to have septic shock 2/2 UTI vs sacral ulcer vs PNA, requiring pressor support and MICU stay. 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 Offer was made to change the spacer and get cultures, however at that time, the  did not want to put the patient through surgery. Pt completed a course of meropenem, and was resumed on suppressive minocycline.   In the ED, T 100.8 Rectal  , /63, RR 14, SpO2 98%RA  MEETS SIRS CRITERIA based on HR and temperature.   She was given IVNS 1.5L, acetaminophen 650mg and meropenem 1g. (24 Mar 2019 20:54)      Interventions taken   continue with current antibiotic   spoke with [facility] will see patient   will sign out to night NP   continue to monitor   infectious disease appreciated  started on meropenem   continue with Vancomycin   started with gentle hydration Normal saline 25cc/hr for 10hours    Discussed  a above plan with Dr Bolden agreed                         Laura Nettles NP-C

## 2019-03-31 NOTE — CHART NOTE - NSCHARTNOTEFT_GEN_A_CORE
CC:      HPI:  Called by RN  Patient denied headache, dizziness, CP, SOB, abdominal  pain, N/V/D, numbness/tingling, extremity weakness, dysuria.         ROS:  CONSTITUTIONAL:  No fever, chills, rigors  CARDIOVASCULAR:  No chest pain or palpitations  RESPIRATORY:   No SOB, cough, dyspnea on exertion.  No wheezing  GASTROINTESTINAL:  No abd pain, N/V, diarrhea/constipation  EXTREMITIES:  No swelling or joint pain  GENITOURINARY:  No burning on urination, increased frequency or urgency.  No flank pain  NEUROLOGIC:  No HA, visual disturbances  SKIN: No rashes        PAST MEDICAL & SURGICAL HISTORY:  Fever  Family history of dementia (Grandparent)  Family history of colon cancer (Grandparent)  No pertinent family history in first degree relatives  Handoff  MEWS Score  CVA (cerebral vascular accident)  Fatty pancreas  Fatty liver  PNA (pneumonia)  Pulmonary HTN  IGT (impaired glucose tolerance)  Ulcerative colitis  Acid reflux  Anxiety  Depression  Mouth sores  HLD (hyperlipidemia)  Asthma  Fever  HTN (hypertension)  Iron deficiency anemia  Asthma  History of DVT (deep vein thrombosis)  Dementia  Functional quadriplegia  Pressure injury of sacral region, stage 4  Chronic systolic heart failure  SIRS (systemic inflammatory response syndrome)  Humeral head fracture  H/O: hysterectomy  H/O cataract extraction, left  History of knee replacement  DIFF BREATHING  Prosthetic hip infection  5        Vital Signs Last 24 Hrs  T(C): 38.9 (30 Mar 2019 22:10), Max: 38.9 (30 Mar 2019 22:10)  T(F): 102 (30 Mar 2019 22:10), Max: 102 (30 Mar 2019 22:10)  HR: 123 (30 Mar 2019 23:39) (107 - 123)  BP: 132/67 (30 Mar 2019 23:39) (132/67 - 156/85)  BP(mean): --  RR: 18 (30 Mar 2019 22:10) (18 - 18)  SpO2: 94% (30 Mar 2019 22:10) (94% - 96%)      Physical Exam:  General: WN/WD NAD, AOx3, nontoxic appearing  Head:  NC/AT  CV: RRR, S1S2   Respiratory: CTA B/L, nonlabored  Abdominal: (+) bowel sounds x4. Soft, NT, ND, no palpable mass, no guarding, or rebound tenderness  Genitourinary: ? Gavin   MSK: No BLLE edema, + peripheral pulses, FROM all 4 extremity  Skin: (+) warm, dry   Psych: Appropriate affect       Labs:                        7.4    25.1  )-----------( 489      ( 30 Mar 2019 06:30 )             22.4     03-29    136  |  98  |  43<H>  ----------------------------<  188<H>  4.2   |  25  |  0.35<L>    Ca    8.8      29 Mar 2019 06:36  Phos  2.2     03-29  Mg     1.9     03-29    TPro  5.6<L>  /  Alb  3.0<L>  /  TBili  0.2  /  DBili  x   /  AST  12  /  ALT  16  /  AlkPhos  125<H>  03-29      Urinalysis Basic - ( 03-28 @ 23:38 )    Color: Negative / Appearance: Negative / SG: -- / pH: Negative  Gluc: Negative / Ketone: Yellow  / Bili: Few / Urobili: 2   Blood: 1 / Protein: -- / Nitrite: Negative   Leuk Esterase: Negative / RBC: 1.026 / WBC --   Sq Epi: 30 mg/dL / Non Sq Epi: 3 / Bacteria: 6.0            Radiology:          Assessment & Plan:  HPI:  This patient is a 68yoF with PMH of advanced dementia (nonverbal, dysphagia with PEG tube, functional quadriplegic, chronic gavin catheter), sacral stage 4 pressure ulcer, heel pressure ulcers, asthma on chronic prednisone., hLd, CVA, UC (in remission for 30years), 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 History was provided by patient's daughters, Tere and Angie at bedside. Patient has been having persistent fever at home up to 103F. She has appeared more lethargic for the last 2 days and has had labored breathing, but no cough. She has loose nonbloody stools. No recent emesis, melena or hematuria. Urine output via gavin catheter is normal yellow appearance and had "good output." Patient gets regular wound care but has persistent ulcers. Because of persistent fevers, pt was brought to ED.     This patient was previously hospitalized from 2/26-3/18/2019 for fevers, found to have septic shock 2/2 UTI vs sacral ulcer vs PNA, requiring pressor support and MICU stay. 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 Offer was made to change the spacer and get cultures, however at that time, the  did not want to put the patient through surgery. Pt completed a course of meropenem, and was resumed on suppressive minocycline.     In the ED, T 100.8 Rectal  , /63, RR 14, SpO2 98%RA  MEETS SIRS CRITERIA based on HR and temperature.   She was given IVNS 1.5L, acetaminophen 650mg and meropenem 1g. (24 Mar 2019 20:54)        -  -  -  -Primary Team to follow up in AM, attending to follow       Ginny High PA-C  Dept of Medicine CC: FEVER   HPI:  Called by RN to relate pt with temp of 102.0 rectal  IVPB Tylenol 500 mg ordered and given; ice packs applied to pt  Patient seen & examined at bedside, with  at bedside, she is non verbal, did not appear in distress, was alert, and did not appear toxic  Chart reviewed, pt. admitted with fever, noted to have GPC in anaerobic bottle, 3/29  Pt currently in IV Vancomycin; ID guiding treatment         ROS: Non obtainable; non verbal  CONSTITUTIONAL:  + fever  CARDIOVASCULAR:    RESPIRATORY:    GASTROINTESTINAL:    EXTREMITIES:    GENITOURINARY:    NEUROLOGIC:   SKIN:       PAST MEDICAL & SURGICAL HISTORY:  CVA (cerebral vascular accident)  Fatty pancreas  PNA (pneumonia)  Pulmonary HTN  IGT (impaired glucose tolerance)  Ulcerative colitis  Acid reflux  Anxiety  Depression  Mouth sores  HLD (hyperlipidemia)  Asthma  Humeral head fracture  H/O: hysterectomy  H/O cataract extraction, left  History of knee replacement        Vital Signs Last 24 Hrs  T(C): 38.9 (30 Mar 2019 22:10), Max: 38.9 (30 Mar 2019 22:10)  T(F): 102 (30 Mar 2019 22:10), Max: 102 (30 Mar 2019 22:10)  HR: 123 (30 Mar 2019 23:39) (107 - 123)  BP: 132/67 (30 Mar 2019 23:39) (132/67 - 156/85)  BP(mean): --  RR: 18 (30 Mar 2019 22:10) (18 - 18)  SpO2: 94% (30 Mar 2019 22:10) (94% - 96%)      Physical Exam:  General: Frail female in NAD, Alert, nontoxic appearing  Head:  NC/AT  CV: RRR, S1S2   Respiratory: CTA B/L, nonlabored  Abdominal: (+) bowel sounds x4. Soft, non tender, non distended, + PEG in place  Genitourinary: + Gavin   MSK: No BLLE edema, + peripheral pulses  Skin: (+) warm, dry   Psych: Appropriate Affect      Labs:                        7.4    25.1  )-----------( 489      ( 30 Mar 2019 06:30 )             22.4     03-29    136  |  98  |  43<H>  ----------------------------<  188<H>  4.2   |  25  |  0.35<L>    Ca    8.8      29 Mar 2019 06:36  Phos  2.2     03-29  Mg     1.9     03-29    TPro  5.6<L>  /  Alb  3.0<L>  /  TBili  0.2  /  DBili  x   /  AST  12  /  ALT  16  /  AlkPhos  125<H>  03-29      Urinalysis Basic - ( 03-28 @ 23:38 )    Color: Negative / Appearance: Negative / SG: -- / pH: Negative  Gluc: Negative / Ketone: Yellow  / Bili: Few / Urobili: 2   Blood: 1 / Protein: -- / Nitrite: Negative   Leuk Esterase: Negative / RBC: 1.026 / WBC --   Sq Epi: 30 mg/dL / Non Sq Epi: 3 / Bacteria: 6.0        Radiology:    Assessment & Plan:  HPI:  This patient is a 68yoF with PMH of advanced dementia (nonverbal, dysphagia with PEG tube, functional quadriplegic, chronic gavin catheter), sacral stage 4 pressure ulcer, heel pressure ulcers, asthma on chronic prednisone., hLd, CVA, UC (in remission for 30years), 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 History was provided by patient's daughters, Tere and Angie at bedside. Patient has been having persistent fever at home up to 103F. She has appeared more lethargic for the last 2 days and has had labored breathing, but no cough. She has loose nonbloody stools. No recent emesis, melena or hematuria. Urine output via gavin catheter is normal yellow appearance and had "good output." Patient gets regular wound care but has persistent ulcers. Because of persistent fevers, pt was brought to ED.     This patient was previously hospitalized from 2/26-3/18/2019 for fevers, found to have septic shock 2/2 UTI vs sacral ulcer vs PNA, requiring pressor support and MICU stay. 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 Offer was made to change the spacer and get cultures, however at that time, the  did not want to put the patient through surgery. Pt completed a course of meropenem, and was resumed on suppressive minocycline.     In the ED, T 100.8 Rectal  , /63, RR 14, SpO2 98%RA  MEETS SIRS CRITERIA based on HR and temperature.   She was given IVNS 1.5L, acetaminophen 650mg and meropenem 1g. (24 Mar 2019 20:54)    Pt seen for recurrent fever, rectal temp 102. Pt did not appear toxic.  VSS; pt initially tachycardic 120's ST, likely driven by fever   at bedside was concerned about BB, and how soon it is to be given. Explained that HR is due to fever, and priority was to   administer Tylenol first.    PLAN:  -Continue to monitor  -Tylenol 500 mg IVPB given             -per Pharmacy, based on pt wt < 50 kg, pt cannot receive full Gram as one dose  -Will monitor response post Tylenol  -Rpt CBC in AM  -Follow up on blood culture results 3/29             -GPC in anerobic bottle  -Continue Vancomycin as ordered  -ID to follow  -Primary Team to follow up in AM, attending to follow       Ginny Hgih PA-C  Dept of Medicine

## 2019-03-31 NOTE — PROVIDER CONTACT NOTE (OTHER) - ASSESSMENT
Patient Alert. Patient is minimally verbal at this time. Patient non-verbal at this time. No respiratory distress noted. No non-verbal signs and symptoms of pain noted at this time. Patient's Heart Rhythm is Sinus Tachycardia on the monitor and Heart Rate is 100 - 110's on the monitor. Patient's Vital Signs: Temperature 99.6 F Rectal, Heart Rate 108, Blood Pressure 146/84, Respiratory Rate 20 and O2 Saturation 100% Room Air. Patient's Breath Sounds noted to be clear and equal bilaterally. Patient has an active order for Duoneb every 6 hours and next dose scheduled for 18:00.

## 2019-03-31 NOTE — PROGRESS NOTE ADULT - SUBJECTIVE AND OBJECTIVE BOX
---___---___---___---___---___---___ ---___---___---___---___---___---___---___---___---___---                    <<<  M E D I C A L   A T T E N D I N G    F O L L O W    U P   N O T E  >>>    had fever last nigh and also wbc 24.9 thousand on cooling blanket as well   ---___---___---___---___---___---      <<<  MEDICATIONS:  >>>    MEDICATIONS  (STANDING):  ALBUTerol    90 MICROgram(s) HFA Inhaler 1 Puff(s) Inhalation every 4 hours  ALBUTerol/ipratropium for Nebulization 3 milliLiter(s) Nebulizer every 6 hours  apixaban 5 milliGRAM(s) Oral every 12 hours  ascorbic acid 500 milliGRAM(s) Oral daily  buDESOnide 160 MICROgram(s)/formoterol 4.5 MICROgram(s) Inhaler 2 Puff(s) Inhalation two times a day  clonazePAM Tablet 1.5 milliGRAM(s) Oral <User Schedule>  ergocalciferol 80996 Unit(s) Oral every week  famotidine    Tablet 20 milliGRAM(s) Oral daily  ferrous    sulfate Liquid 300 milliGRAM(s) Enteral Tube daily  gabapentin   Solution 300 milliGRAM(s) Oral two times a day  hydrocortisone sodium succinate Injectable 100 milliGRAM(s) IV Push every 8 hours  Medical Marijuana Oil 1 milliLiter(s) 1 milliLiter(s) Oral <User Schedule>  metoprolol tartrate 12.5 milliGRAM(s) Enteral Tube two times a day  midodrine 10 milliGRAM(s) Oral every 8 hours  multivitamin 1 Tablet(s) Oral daily  petrolatum white Ointment 1 Application(s) Topical three times a day  QUEtiapine 25 milliGRAM(s) Oral at bedtime  sodium chloride 0.9% Bolus 500 milliLiter(s) IV Bolus once  tiotropium 18 MICROgram(s) Capsule 1 Capsule(s) Inhalation daily  tiZANidine 4 milliGRAM(s) Oral <User Schedule>  vancomycin  IVPB 1000 milliGRAM(s) IV Intermittent every 24 hours      MEDICATIONS  (PRN):  acetaminophen    Suspension .. 650 milliGRAM(s) Oral every 6 hours PRN Temp greater or equal to 38C (100.4F), Mild Pain (1 - 3), Moderate Pain (4 - 6)  Dakins Solution - 1/4 Strength 1 Application(s) Topical daily PRN if dressing soiled  diphenhydrAMINE   Elixir 25 milliGRAM(s) Oral every 6 hours PRN Rash and/or Itching  nystatin Powder 1 Application(s) Topical two times a day PRN rash/itchiness  sodium chloride 0.65% Nasal 1 Spray(s) Both Nostrils three times a day PRN Nasal Congestion       ---___---___---___---___---___---     <<<REVIEW OF SYSTEM: >>>    unable  to obtain   ---___---___---___---___---___---          <<<  VITAL SIGNS: >>>    T(F): 97.7 (03-31-19 @ 08:27), Max: 102 (03-30-19 @ 22:10)  HR: 94 (03-31-19 @ 08:27) (89 - 123)  BP: 138/75 (03-31-19 @ 08:27) (132/67 - 156/85)  RR: 18 (03-31-19 @ 08:27) (18 - 18)  SpO2: 99% (03-31-19 @ 08:27) (93% - 99%)  Wt(kg): --  CAPILLARY BLOOD GLUCOSE        I&O's Summary    30 Mar 2019 07:01  -  31 Mar 2019 07:00  --------------------------------------------------------  IN: 0 mL / OUT: 1950 mL / NET: -1950 mL    31 Mar 2019 07:01  -  31 Mar 2019 12:27  --------------------------------------------------------  IN: 393 mL / OUT: 0 mL / NET: 393 mL         ---___---___---___---___---___---                       PHYSICAL EXAM:    GEN: A&O X 0 , NAD , comfortable  HEENT: NCAT, PERRL, MMM, no scleral icterus, hearing intact  NECK: Supple, No JVD  CVS: S1S2 , regular , No M/R/G appreciated  PULM: CTA B/L,  no W/R/R appreciated  ABD.: soft. non tender, non distended,  bowel sounds present peg noted   Extrem: intact pulses , no edema noted contractures noted  Derm: No rash or ecchymosis noted sacral decubitus noted   PSYCH: normal mood, no depression, not anxious     ---___---___---___---___---___---     <<<  LAB AND IMAGING: >>>                          7.7    24.9  )-----------( 517      ( 31 Mar 2019 03:17 )             23.6               03-31    140  |  99  |  43<H>  ----------------------------<  259<H>  3.3<L>   |  24  |  0.35<L>    Ca    8.3<L>      31 Mar 2019 03:17                                 [All pertinent / recent available Imaging reports and other labs reviewed]     ---___---___---___---___---___---___ ---___---___---___---___---           <<<  A S S E S S M E N T   A N D   P L A N :  >>>    mrsa infection chronic  (+) blood cultures . may need to consider iv abx and picc line for home  if wbc continues to rise . awaiting final recomendation from id   asthma continue budesonide    sacral decubitus  continue wound care   h/o dvt  on eliquis   chf  on toprol xl pulse improved  chronic pain  on medical marijuana  seroquel and klonopin for agitation secondary to dementia      -GI/DVT Prophylaxis.    --------------------------------------------  Case discussed with   Education given on     >>______________________<<      Deniz Bolden .         phone   [phone]

## 2019-04-01 LAB
ANION GAP SERPL CALC-SCNC: 14 MMOL/L — SIGNIFICANT CHANGE UP (ref 5–17)
BUN SERPL-MCNC: 36 MG/DL — HIGH (ref 7–23)
CALCIUM SERPL-MCNC: 8.2 MG/DL — LOW (ref 8.4–10.5)
CHLORIDE SERPL-SCNC: 98 MMOL/L — SIGNIFICANT CHANGE UP (ref 96–108)
CO2 SERPL-SCNC: 28 MMOL/L — SIGNIFICANT CHANGE UP (ref 22–31)
CREAT SERPL-MCNC: 0.31 MG/DL — LOW (ref 0.5–1.3)
GLUCOSE SERPL-MCNC: 166 MG/DL — HIGH (ref 70–99)
HCT VFR BLD CALC: 22.8 % — LOW (ref 34.5–45)
HGB BLD-MCNC: 7.5 G/DL — LOW (ref 11.5–15.5)
MCHC RBC-ENTMCNC: 30.3 PG — SIGNIFICANT CHANGE UP (ref 27–34)
MCHC RBC-ENTMCNC: 33 GM/DL — SIGNIFICANT CHANGE UP (ref 32–36)
MCV RBC AUTO: 92 FL — SIGNIFICANT CHANGE UP (ref 80–100)
PLATELET # BLD AUTO: 482 K/UL — HIGH (ref 150–400)
POTASSIUM SERPL-MCNC: 3.2 MMOL/L — LOW (ref 3.5–5.3)
POTASSIUM SERPL-SCNC: 3.2 MMOL/L — LOW (ref 3.5–5.3)
RBC # BLD: 2.48 M/UL — LOW (ref 3.8–5.2)
RBC # FLD: 17.2 % — HIGH (ref 10.3–14.5)
SODIUM SERPL-SCNC: 140 MMOL/L — SIGNIFICANT CHANGE UP (ref 135–145)
WBC # BLD: 23.5 K/UL — HIGH (ref 3.8–10.5)
WBC # FLD AUTO: 23.5 K/UL — HIGH (ref 3.8–10.5)

## 2019-04-01 PROCEDURE — 99232 SBSQ HOSP IP/OBS MODERATE 35: CPT

## 2019-04-01 RX ORDER — ETHACRYNIC ACID 25 MG/1
25 TABLET ORAL ONCE
Qty: 0 | Refills: 0 | Status: COMPLETED | OUTPATIENT
Start: 2019-04-01 | End: 2019-04-02

## 2019-04-01 RX ORDER — POTASSIUM CHLORIDE 20 MEQ
10 PACKET (EA) ORAL ONCE
Qty: 0 | Refills: 0 | Status: COMPLETED | OUTPATIENT
Start: 2019-04-01 | End: 2019-04-01

## 2019-04-01 RX ADMIN — FAMOTIDINE 20 MILLIGRAM(S): 10 INJECTION INTRAVENOUS at 09:07

## 2019-04-01 RX ADMIN — Medication 25 MILLIGRAM(S): at 09:06

## 2019-04-01 RX ADMIN — Medication 3 MILLILITER(S): at 23:03

## 2019-04-01 RX ADMIN — APIXABAN 5 MILLIGRAM(S): 2.5 TABLET, FILM COATED ORAL at 21:40

## 2019-04-01 RX ADMIN — Medication 12.5 MILLIGRAM(S): at 21:40

## 2019-04-01 RX ADMIN — BUDESONIDE AND FORMOTEROL FUMARATE DIHYDRATE 2 PUFF(S): 160; 4.5 AEROSOL RESPIRATORY (INHALATION) at 21:40

## 2019-04-01 RX ADMIN — APIXABAN 5 MILLIGRAM(S): 2.5 TABLET, FILM COATED ORAL at 09:06

## 2019-04-01 RX ADMIN — GABAPENTIN 300 MILLIGRAM(S): 400 CAPSULE ORAL at 09:52

## 2019-04-01 RX ADMIN — Medication 1 TABLET(S): at 09:06

## 2019-04-01 RX ADMIN — Medication 650 MILLIGRAM(S): at 16:29

## 2019-04-01 RX ADMIN — Medication 1 APPLICATION(S): at 21:45

## 2019-04-01 RX ADMIN — Medication 100 MILLIEQUIVALENT(S): at 16:21

## 2019-04-01 RX ADMIN — Medication 500 MILLIGRAM(S): at 09:06

## 2019-04-01 RX ADMIN — Medication 3 MILLILITER(S): at 06:25

## 2019-04-01 RX ADMIN — MIDODRINE HYDROCHLORIDE 10 MILLIGRAM(S): 2.5 TABLET ORAL at 12:22

## 2019-04-01 RX ADMIN — MIDODRINE HYDROCHLORIDE 10 MILLIGRAM(S): 2.5 TABLET ORAL at 06:25

## 2019-04-01 RX ADMIN — LISINOPRIL 5 MILLIGRAM(S): 2.5 TABLET ORAL at 23:02

## 2019-04-01 RX ADMIN — Medication 300 MILLIGRAM(S): at 09:06

## 2019-04-01 RX ADMIN — MEROPENEM 100 MILLIGRAM(S): 1 INJECTION INTRAVENOUS at 06:21

## 2019-04-01 RX ADMIN — MEROPENEM 100 MILLIGRAM(S): 1 INJECTION INTRAVENOUS at 17:39

## 2019-04-01 RX ADMIN — Medication 3 MILLILITER(S): at 17:40

## 2019-04-01 RX ADMIN — BUDESONIDE AND FORMOTEROL FUMARATE DIHYDRATE 2 PUFF(S): 160; 4.5 AEROSOL RESPIRATORY (INHALATION) at 09:07

## 2019-04-01 RX ADMIN — Medication 100 MILLIGRAM(S): at 06:19

## 2019-04-01 RX ADMIN — Medication 100 MILLIGRAM(S): at 23:02

## 2019-04-01 RX ADMIN — Medication 1 APPLICATION(S): at 12:22

## 2019-04-01 RX ADMIN — QUETIAPINE FUMARATE 25 MILLIGRAM(S): 200 TABLET, FILM COATED ORAL at 23:02

## 2019-04-01 RX ADMIN — Medication 12.5 MILLIGRAM(S): at 09:06

## 2019-04-01 RX ADMIN — Medication 1.5 MILLIGRAM(S): at 23:02

## 2019-04-01 RX ADMIN — Medication 100 MILLIGRAM(S): at 12:21

## 2019-04-01 RX ADMIN — Medication 3 MILLILITER(S): at 12:20

## 2019-04-01 RX ADMIN — Medication 1 APPLICATION(S): at 06:25

## 2019-04-01 RX ADMIN — ERGOCALCIFEROL 50000 UNIT(S): 1.25 CAPSULE ORAL at 09:07

## 2019-04-01 RX ADMIN — Medication 250 MILLIGRAM(S): at 09:06

## 2019-04-01 RX ADMIN — TIZANIDINE 4 MILLIGRAM(S): 4 TABLET ORAL at 23:02

## 2019-04-01 RX ADMIN — TIZANIDINE 4 MILLIGRAM(S): 4 TABLET ORAL at 09:06

## 2019-04-01 RX ADMIN — Medication 650 MILLIGRAM(S): at 18:18

## 2019-04-01 NOTE — CHART NOTE - NSCHARTNOTEFT_GEN_A_CORE
Per attending patient with sulfa allergy, requesting ethacrynic acid inbetween half units of blood. D/W pharmacy ordered for ethacrynic acid 25mg IVP x1 for sulfa allergy, RN aware of plan. Will monitor clinical status.    Francis Dixon PA-C   Department of Medicine

## 2019-04-01 NOTE — PROGRESS NOTE ADULT - SUBJECTIVE AND OBJECTIVE BOX
infectious diseases progress note:    Patient is a 68y old  Female who presents with a chief complaint of fevers (31 Mar 2019 16:28)        Fever             Allergies    ASA; dye contrast (Anaphylaxis)  aspirin (Short breath)  divalproex sodium (Other (Unknown))  Haldol (Other (Unknown))  penicillin (Short breath; Rash)  sulfa drugs (Short breath; Rash)  vancomycin (Rash; Urticaria; Hives)  Xanax (Other (Unknown))    Intolerances        ANTIBIOTICS/RELEVANT:  antimicrobials  meropenem  IVPB 1000 milliGRAM(s) IV Intermittent every 12 hours  vancomycin  IVPB 1000 milliGRAM(s) IV Intermittent every 24 hours    immunologic:    OTHER:  acetaminophen    Suspension .. 650 milliGRAM(s) Oral every 6 hours PRN  ALBUTerol    90 MICROgram(s) HFA Inhaler 1 Puff(s) Inhalation every 4 hours  ALBUTerol/ipratropium for Nebulization 3 milliLiter(s) Nebulizer every 6 hours  apixaban 5 milliGRAM(s) Oral every 12 hours  ascorbic acid 500 milliGRAM(s) Oral daily  buDESOnide 160 MICROgram(s)/formoterol 4.5 MICROgram(s) Inhaler 2 Puff(s) Inhalation two times a day  clonazePAM Tablet 1.5 milliGRAM(s) Oral <User Schedule>  Dakins Solution - 1/4 Strength 1 Application(s) Topical daily PRN  diphenhydrAMINE   Elixir 25 milliGRAM(s) Oral every 6 hours PRN  ergocalciferol 23371 Unit(s) Oral every week  famotidine    Tablet 20 milliGRAM(s) Oral daily  ferrous    sulfate Liquid 300 milliGRAM(s) Enteral Tube daily  gabapentin   Solution 300 milliGRAM(s) Oral two times a day  hydrocortisone sodium succinate Injectable 100 milliGRAM(s) IV Push every 8 hours  lisinopril 5 milliGRAM(s) Enteral Tube at bedtime  Medical Marijuana Oil 1 milliLiter(s) 1 milliLiter(s) Oral <User Schedule>  metoprolol tartrate 12.5 milliGRAM(s) Enteral Tube two times a day  midodrine 10 milliGRAM(s) Oral every 8 hours  multivitamin 1 Tablet(s) Oral daily  nystatin Powder 1 Application(s) Topical two times a day PRN  petrolatum white Ointment 1 Application(s) Topical three times a day  QUEtiapine 25 milliGRAM(s) Oral at bedtime  sodium chloride 0.65% Nasal 1 Spray(s) Both Nostrils three times a day PRN  sodium chloride 0.9% Bolus 500 milliLiter(s) IV Bolus once  tiotropium 18 MICROgram(s) Capsule 1 Capsule(s) Inhalation daily  tiZANidine 4 milliGRAM(s) Oral <User Schedule>      Objective:  Vital Signs Last 24 Hrs  T(C): 36.7 (01 Apr 2019 04:18), Max: 37.9 (31 Mar 2019 21:09)  T(F): 98 (01 Apr 2019 04:18), Max: 100.3 (31 Mar 2019 21:09)  HR: 104 (01 Apr 2019 04:18) (94 - 118)  BP: 157/87 (01 Apr 2019 04:18) (130/74 - 168/92)  BP(mean): --  RR: 18 (01 Apr 2019 04:18) (18 - 20)  SpO2: 97% (01 Apr 2019 04:18) (95% - 100%)    PHYSICAL EXAM:   Eyes:JACQUELIN, EOMI  Ear/Nose/Throat: no oral lesion, no sinus tenderness on percussion	  Neck:no JVD, no lymphadenopathy, supple  Respiratory: CTA maryjane  Cardiovascular: S1S2 RRR, no murmurs  Gastrointestinal:soft, (+) BS, no HSM           LABS:                        7.5    23.5  )-----------( 482      ( 01 Apr 2019 07:04 )             22.8     04-01    140  |  98  |  36<H>  ----------------------------<  166<H>  3.2<L>   |  28  |  0.31<L>    Ca    8.2<L>      01 Apr 2019 07:02              MICROBIOLOGY:    RECENT CULTURES:  03-29 @ 03:53 .Blood Blood-Peripheral   PCR    Growth in anaerobic bottle: Gram Positive Cocci in Clusters    Blood Culture PCR  Blood Culture PCR     No growth to date.    03-29 @ 03:17 .Urine Catheterized                No growth    03-25 @ 13:16 .Stool Feces                GI PCR Results: NOT detected  *******Please Note:*******  GI panel PCR evaluates for:  Campylobacter, Plesiomonas shigelloides, Salmonella,  Vibrio, Yersinia enterocolitica, Enteroaggregative  Escherichia coli (EAEC), Enteropathogenic E.coli (EPEC),  Enterotoxigenic E. coli (ETEC) lt/st, Shiga-like  toxin-producing E. coli (STEC) stx1/stx2,  Shigella/ Enteroinvasive E. coli (EIEC), Cryptosporidium,  Cyclospora cayetanensis, Entamoeba histolytica,  Giardia lamblia, Adenovirus F 40/41, Astrovirus,  Norovirus GI/GII, Rotavirus A, Sapovirus          RESPIRATORY CULTURES:              RADIOLOGY & ADDITIONAL STUDIES:        Pager [phone]  After 5 pm/weekends or if no response :[phone]

## 2019-04-01 NOTE — PROGRESS NOTE ADULT - ASSESSMENT
68 yr old with advanced dementia , bed bound, contracted, cva, sp removal of infected hip due to mrsa with placement of space last fall.   Pt has been havening fevers for months.  I suspect there is ongoing Om of the hip site but changing the spacer is not a realistic option . chronic Om of the sacrum usually does not cause fevers and does not need prolonged ab therapy .       cultures negative to date  uc with yeast of doubtful significance    vanco to cover mrsa  can dc meropenem  after cultures , we cac decide on endpoint of ab therapy   discussed  with      reluctant to rescan   does not want mri  temp is down on vanco    if it stays down , we will need to place picc and give her a course  of vanco even though it will  not cure a chronic hip infection    or discharge on po Zyvox  discussed with  i detail   wbc and temp up lasr 24 hr  to continue to observe  Zyvox may interact with her Seroquel and other psychotropic drugs     no good options  chest xary is negative and meropenem added ;ast night pending additional cutrues

## 2019-04-01 NOTE — PROGRESS NOTE ADULT - SUBJECTIVE AND OBJECTIVE BOX
---___---___---___---___---___---___ ---___---___---___---___---___---___---___---___---___---                    <<<  M E D I C A L   A T T E N D I N G    F O L L O W    U P   N O T E  >>>    patient now with swollen left knee      ---___---___---___---___---___---      <<<  MEDICATIONS:  >>>    MEDICATIONS  (STANDING):  ALBUTerol    90 MICROgram(s) HFA Inhaler 1 Puff(s) Inhalation every 4 hours  ALBUTerol/ipratropium for Nebulization 3 milliLiter(s) Nebulizer every 6 hours  apixaban 5 milliGRAM(s) Oral every 12 hours  ascorbic acid 500 milliGRAM(s) Oral daily  buDESOnide 160 MICROgram(s)/formoterol 4.5 MICROgram(s) Inhaler 2 Puff(s) Inhalation two times a day  clonazePAM Tablet 1.5 milliGRAM(s) Oral <User Schedule>  ergocalciferol 97995 Unit(s) Oral every week  famotidine    Tablet 20 milliGRAM(s) Oral daily  ferrous    sulfate Liquid 300 milliGRAM(s) Enteral Tube daily  gabapentin   Solution 300 milliGRAM(s) Oral two times a day  hydrocortisone sodium succinate Injectable 100 milliGRAM(s) IV Push every 8 hours  lisinopril 5 milliGRAM(s) Enteral Tube at bedtime  Medical Marijuana Oil 1 milliLiter(s) 1 milliLiter(s) Oral <User Schedule>  meropenem  IVPB 1000 milliGRAM(s) IV Intermittent every 12 hours  metoprolol tartrate 12.5 milliGRAM(s) Enteral Tube two times a day  midodrine 10 milliGRAM(s) Oral every 8 hours  multivitamin 1 Tablet(s) Oral daily  petrolatum white Ointment 1 Application(s) Topical three times a day  QUEtiapine 25 milliGRAM(s) Oral at bedtime  sodium chloride 0.9% Bolus 500 milliLiter(s) IV Bolus once  tiotropium 18 MICROgram(s) Capsule 1 Capsule(s) Inhalation daily  tiZANidine 4 milliGRAM(s) Oral <User Schedule>  vancomycin  IVPB 1000 milliGRAM(s) IV Intermittent every 24 hours      MEDICATIONS  (PRN):  acetaminophen    Suspension .. 650 milliGRAM(s) Oral every 6 hours PRN Temp greater or equal to 38C (100.4F), Mild Pain (1 - 3), Moderate Pain (4 - 6)  Dakins Solution - 1/4 Strength 1 Application(s) Topical daily PRN if dressing soiled  diphenhydrAMINE   Elixir 25 milliGRAM(s) Oral every 6 hours PRN Rash and/or Itching  nystatin Powder 1 Application(s) Topical two times a day PRN rash/itchiness  sodium chloride 0.65% Nasal 1 Spray(s) Both Nostrils three times a day PRN Nasal Congestion       ---___---___---___---___---___---     <<<REVIEW OF SYSTEM: >>>    unable to obtain      ---___---___---___---___---___---          <<<  VITAL SIGNS: >>>    T(F): 102 (04-01-19 @ 16:29), Max: 102 (04-01-19 @ 16:29)  HR: 106 (04-01-19 @ 12:17) (104 - 118)  BP: 160/79 (04-01-19 @ 12:17) (155/70 - 168/92)  RR: 18 (04-01-19 @ 12:17) (18 - 19)  SpO2: 97% (04-01-19 @ 12:17) (94% - 98%)  Wt(kg): --  CAPILLARY BLOOD GLUCOSE        I&O's Summary    31 Mar 2019 07:01  -  01 Apr 2019 07:00  --------------------------------------------------------  IN: 1621 mL / OUT: 1325 mL / NET: 296 mL    01 Apr 2019 07:01  -  01 Apr 2019 17:26  --------------------------------------------------------  IN: 0 mL / OUT: 2100 mL / NET: -2100 mL         ---___---___---___---___---___---                       PHYSICAL EXAM:    GEN: A&O X 0 , NAD , comfortable  HEENT: NCAT, PERRL, MMM, no scleral icterus, hearing intact  NECK: Supple, No JVD  CVS: S1S2 , regular , No M/R/G appreciated  PULM: CTA B/L,  no W/R/R appreciated  ABD.: soft. non tender, non distended,  bowel sounds present peg noted   Extrem: intact pulses , edema to the left knee noted with crepitus   Derm: No rash or ecchymosis noted  PSYCH: normal mood, no depression, not anxious     ---___---___---___---___---___---     <<<  LAB AND IMAGING: >>>                          7.5    23.5  )-----------( 482      ( 01 Apr 2019 07:04 )             22.8               04-01    140  |  98  |  36<H>  ----------------------------<  166<H>  3.2<L>   |  28  |  0.31<L>    Ca    8.2<L>      01 Apr 2019 07:02                                 [All pertinent / recent available Imaging reports and other labs reviewed]     ---___---___---___---___---___---___ ---___---___---___---___---           <<<  A S S E S S M E N T   A N D   P L A N :  >>>    crepitus left knee xray ordered  sespsi chroinic infection   continue iv abx . id consult appreciated  asthma on budesonide  chronic pain on medical marijuana  agitation on klonopin and seroquel  chf on toprol and lisinorpil      -GI/DVT Prophylaxis.    --------------------------------------------  Case discussed with   Education given on     >>______________________<<      Deniz Bolden .         phone   [phone]

## 2019-04-02 LAB
ANION GAP SERPL CALC-SCNC: 13 MMOL/L — SIGNIFICANT CHANGE UP (ref 5–17)
ANION GAP SERPL CALC-SCNC: 15 MMOL/L — SIGNIFICANT CHANGE UP (ref 5–17)
BLD GP AB SCN SERPL QL: NEGATIVE — SIGNIFICANT CHANGE UP
BUN SERPL-MCNC: 31 MG/DL — HIGH (ref 7–23)
BUN SERPL-MCNC: 34 MG/DL — HIGH (ref 7–23)
CALCIUM SERPL-MCNC: 8.1 MG/DL — LOW (ref 8.4–10.5)
CALCIUM SERPL-MCNC: 8.1 MG/DL — LOW (ref 8.4–10.5)
CHLORIDE SERPL-SCNC: 95 MMOL/L — LOW (ref 96–108)
CHLORIDE SERPL-SCNC: 97 MMOL/L — SIGNIFICANT CHANGE UP (ref 96–108)
CO2 SERPL-SCNC: 31 MMOL/L — SIGNIFICANT CHANGE UP (ref 22–31)
CO2 SERPL-SCNC: 31 MMOL/L — SIGNIFICANT CHANGE UP (ref 22–31)
CREAT SERPL-MCNC: 0.31 MG/DL — LOW (ref 0.5–1.3)
CREAT SERPL-MCNC: <0.3 MG/DL — LOW (ref 0.5–1.3)
GLUCOSE SERPL-MCNC: 221 MG/DL — HIGH (ref 70–99)
GLUCOSE SERPL-MCNC: 226 MG/DL — HIGH (ref 70–99)
HCT VFR BLD CALC: 31.9 % — LOW (ref 34.5–45)
HCT VFR BLD CALC: 32 % — LOW (ref 34.5–45)
HGB BLD-MCNC: 10.4 G/DL — LOW (ref 11.5–15.5)
HGB BLD-MCNC: 10.8 G/DL — LOW (ref 11.5–15.5)
MCHC RBC-ENTMCNC: 29.3 PG — SIGNIFICANT CHANGE UP (ref 27–34)
MCHC RBC-ENTMCNC: 30.5 PG — SIGNIFICANT CHANGE UP (ref 27–34)
MCHC RBC-ENTMCNC: 32.7 GM/DL — SIGNIFICANT CHANGE UP (ref 32–36)
MCHC RBC-ENTMCNC: 33.7 GM/DL — SIGNIFICANT CHANGE UP (ref 32–36)
MCV RBC AUTO: 89.7 FL — SIGNIFICANT CHANGE UP (ref 80–100)
MCV RBC AUTO: 90.5 FL — SIGNIFICANT CHANGE UP (ref 80–100)
PLATELET # BLD AUTO: 401 K/UL — HIGH (ref 150–400)
PLATELET # BLD AUTO: 426 K/UL — HIGH (ref 150–400)
POTASSIUM SERPL-MCNC: 2.6 MMOL/L — CRITICAL LOW (ref 3.5–5.3)
POTASSIUM SERPL-MCNC: 3.3 MMOL/L — LOW (ref 3.5–5.3)
POTASSIUM SERPL-SCNC: 2.6 MMOL/L — CRITICAL LOW (ref 3.5–5.3)
POTASSIUM SERPL-SCNC: 3.3 MMOL/L — LOW (ref 3.5–5.3)
RBC # BLD: 3.54 M/UL — LOW (ref 3.8–5.2)
RBC # BLD: 3.56 M/UL — LOW (ref 3.8–5.2)
RBC # FLD: 16.3 % — HIGH (ref 10.3–14.5)
RBC # FLD: 17 % — HIGH (ref 10.3–14.5)
RH IG SCN BLD-IMP: NEGATIVE — SIGNIFICANT CHANGE UP
SODIUM SERPL-SCNC: 141 MMOL/L — SIGNIFICANT CHANGE UP (ref 135–145)
SODIUM SERPL-SCNC: 141 MMOL/L — SIGNIFICANT CHANGE UP (ref 135–145)
WBC # BLD: 21.2 K/UL — HIGH (ref 3.8–10.5)
WBC # BLD: 22.1 K/UL — HIGH (ref 3.8–10.5)
WBC # FLD AUTO: 21.2 K/UL — HIGH (ref 3.8–10.5)
WBC # FLD AUTO: 22.1 K/UL — HIGH (ref 3.8–10.5)

## 2019-04-02 PROCEDURE — 73562 X-RAY EXAM OF KNEE 3: CPT | Mod: 26,LT

## 2019-04-02 PROCEDURE — 73551 X-RAY EXAM OF FEMUR 1: CPT | Mod: 26,50

## 2019-04-02 PROCEDURE — 99232 SBSQ HOSP IP/OBS MODERATE 35: CPT

## 2019-04-02 RX ORDER — POTASSIUM CHLORIDE 20 MEQ
40 PACKET (EA) ORAL EVERY 4 HOURS
Qty: 0 | Refills: 0 | Status: COMPLETED | OUTPATIENT
Start: 2019-04-02 | End: 2019-04-03

## 2019-04-02 RX ORDER — POTASSIUM CHLORIDE 20 MEQ
10 PACKET (EA) ORAL
Qty: 0 | Refills: 0 | Status: COMPLETED | OUTPATIENT
Start: 2019-04-02 | End: 2019-04-02

## 2019-04-02 RX ORDER — CLONAZEPAM 1 MG
1.5 TABLET ORAL
Qty: 0 | Refills: 0 | Status: DISCONTINUED | OUTPATIENT
Start: 2019-04-03 | End: 2019-04-10

## 2019-04-02 RX ADMIN — BUDESONIDE AND FORMOTEROL FUMARATE DIHYDRATE 2 PUFF(S): 160; 4.5 AEROSOL RESPIRATORY (INHALATION) at 22:24

## 2019-04-02 RX ADMIN — LISINOPRIL 5 MILLIGRAM(S): 2.5 TABLET ORAL at 22:08

## 2019-04-02 RX ADMIN — Medication 1 TABLET(S): at 09:08

## 2019-04-02 RX ADMIN — Medication 300 MILLIGRAM(S): at 09:09

## 2019-04-02 RX ADMIN — Medication 3 MILLILITER(S): at 06:50

## 2019-04-02 RX ADMIN — QUETIAPINE FUMARATE 25 MILLIGRAM(S): 200 TABLET, FILM COATED ORAL at 22:08

## 2019-04-02 RX ADMIN — Medication 3 MILLILITER(S): at 18:18

## 2019-04-02 RX ADMIN — GABAPENTIN 300 MILLIGRAM(S): 400 CAPSULE ORAL at 22:57

## 2019-04-02 RX ADMIN — Medication 3 MILLILITER(S): at 23:02

## 2019-04-02 RX ADMIN — Medication 1 APPLICATION(S): at 22:22

## 2019-04-02 RX ADMIN — Medication 250 MILLIGRAM(S): at 09:14

## 2019-04-02 RX ADMIN — Medication 25 MILLIGRAM(S): at 09:09

## 2019-04-02 RX ADMIN — Medication 100 MILLIEQUIVALENT(S): at 11:55

## 2019-04-02 RX ADMIN — Medication 1 APPLICATION(S): at 06:57

## 2019-04-02 RX ADMIN — Medication 100 MILLIGRAM(S): at 06:49

## 2019-04-02 RX ADMIN — Medication 40 MILLIEQUIVALENT(S): at 22:56

## 2019-04-02 RX ADMIN — Medication 3 MILLILITER(S): at 11:55

## 2019-04-02 RX ADMIN — MEROPENEM 100 MILLIGRAM(S): 1 INJECTION INTRAVENOUS at 18:17

## 2019-04-02 RX ADMIN — Medication 12.5 MILLIGRAM(S): at 09:10

## 2019-04-02 RX ADMIN — ETHACRYNIC ACID 25 MILLIGRAM(S): 25 TABLET ORAL at 04:32

## 2019-04-02 RX ADMIN — Medication 500 MILLIGRAM(S): at 09:08

## 2019-04-02 RX ADMIN — Medication 100 MILLIGRAM(S): at 14:50

## 2019-04-02 RX ADMIN — BUDESONIDE AND FORMOTEROL FUMARATE DIHYDRATE 2 PUFF(S): 160; 4.5 AEROSOL RESPIRATORY (INHALATION) at 11:38

## 2019-04-02 RX ADMIN — APIXABAN 5 MILLIGRAM(S): 2.5 TABLET, FILM COATED ORAL at 09:08

## 2019-04-02 RX ADMIN — Medication 1 APPLICATION(S): at 13:37

## 2019-04-02 RX ADMIN — Medication 100 MILLIGRAM(S): at 22:08

## 2019-04-02 RX ADMIN — Medication 12.5 MILLIGRAM(S): at 22:08

## 2019-04-02 RX ADMIN — Medication 1.5 MILLIGRAM(S): at 22:08

## 2019-04-02 RX ADMIN — TIZANIDINE 4 MILLIGRAM(S): 4 TABLET ORAL at 22:08

## 2019-04-02 RX ADMIN — MEROPENEM 100 MILLIGRAM(S): 1 INJECTION INTRAVENOUS at 06:49

## 2019-04-02 RX ADMIN — FAMOTIDINE 20 MILLIGRAM(S): 10 INJECTION INTRAVENOUS at 09:08

## 2019-04-02 RX ADMIN — GABAPENTIN 300 MILLIGRAM(S): 400 CAPSULE ORAL at 09:08

## 2019-04-02 RX ADMIN — TIZANIDINE 4 MILLIGRAM(S): 4 TABLET ORAL at 09:08

## 2019-04-02 RX ADMIN — Medication 100 MILLIEQUIVALENT(S): at 13:37

## 2019-04-02 NOTE — CHART NOTE - NSCHARTNOTEFT_GEN_A_CORE
Evaluate and measure for left KAFO knee orthosis to control distal femur fracture. To be fit and delivered when fabrication complete. Discussed with daughter bedside.  Kulwant BOB  [facility]  [phone]

## 2019-04-02 NOTE — PROGRESS NOTE ADULT - SUBJECTIVE AND OBJECTIVE BOX
infectious diseases progress note:    Patient is a 68y old  Female who presents with a chief complaint of fevers (01 Apr 2019 17:26)        Fever             ASA; dye contrast (Anaphylaxis)  aspirin (Short breath)  divalproex sodium (Other (Unknown))  Haldol (Other (Unknown))  penicillin (Short breath; Rash)  sulfa drugs (Short breath; Rash)  vancomycin (Rash; Urticaria; Hives)  Xanax (Other (Unknown))    Intolerances        ANTIBIOTICS/RELEVANT:  antimicrobials  meropenem  IVPB 1000 milliGRAM(s) IV Intermittent every 12 hours  vancomycin  IVPB 1000 milliGRAM(s) IV Intermittent every 24 hours    immunologic:    OTHER:  acetaminophen    Suspension .. 650 milliGRAM(s) Oral every 6 hours PRN  ALBUTerol    90 MICROgram(s) HFA Inhaler 1 Puff(s) Inhalation every 4 hours  ALBUTerol/ipratropium for Nebulization 3 milliLiter(s) Nebulizer every 6 hours  apixaban 5 milliGRAM(s) Oral every 12 hours  ascorbic acid 500 milliGRAM(s) Oral daily  buDESOnide 160 MICROgram(s)/formoterol 4.5 MICROgram(s) Inhaler 2 Puff(s) Inhalation two times a day  clonazePAM Tablet 1.5 milliGRAM(s) Oral <User Schedule>  Dakins Solution - 1/4 Strength 1 Application(s) Topical daily PRN  diphenhydrAMINE   Elixir 25 milliGRAM(s) Oral every 6 hours PRN  ergocalciferol 46368 Unit(s) Oral every week  famotidine    Tablet 20 milliGRAM(s) Oral daily  ferrous    sulfate Liquid 300 milliGRAM(s) Enteral Tube daily  gabapentin   Solution 300 milliGRAM(s) Oral two times a day  hydrocortisone sodium succinate Injectable 100 milliGRAM(s) IV Push every 8 hours  lisinopril 5 milliGRAM(s) Enteral Tube at bedtime  Medical Marijuana Oil 1 milliLiter(s) 1 milliLiter(s) Oral <User Schedule>  metoprolol tartrate 12.5 milliGRAM(s) Enteral Tube two times a day  midodrine 10 milliGRAM(s) Oral every 8 hours  multivitamin 1 Tablet(s) Oral daily  nystatin Powder 1 Application(s) Topical two times a day PRN  petrolatum white Ointment 1 Application(s) Topical three times a day  QUEtiapine 25 milliGRAM(s) Oral at bedtime  sodium chloride 0.65% Nasal 1 Spray(s) Both Nostrils three times a day PRN  sodium chloride 0.9% Bolus 500 milliLiter(s) IV Bolus once  tiotropium 18 MICROgram(s) Capsule 1 Capsule(s) Inhalation daily  tiZANidine 4 milliGRAM(s) Oral <User Schedule>      Objective:  Vital Signs Last 24 Hrs  T(C): 36.9 (02 Apr 2019 06:54), Max: 38.9 (01 Apr 2019 16:29)  T(F): 98.4 (02 Apr 2019 06:54), Max: 102 (01 Apr 2019 16:29)  HR: 97 (02 Apr 2019 06:54) (89 - 106)  BP: 131/76 (02 Apr 2019 06:54) (94/53 - 160/79)  BP(mean): --  RR: 18 (02 Apr 2019 04:35) (18 - 18)  SpO2: 97% (02 Apr 2019 04:35) (94% - 97%)    PHYSICAL EXAM:   -no acute distress  Eyes:JACQUELIN, EOMI  Ear/Nose/Throat: no oral lesion, no sinus tenderness on percussion	  Neck:no JVD, no lymphadenopathy, supple  Respiratory: CTA maryjane  Cardiovascular: S1S2 RRR, no murmurs  Gastrointestinal:soft, (+) BS, no HSM  Extremities:no e/e/c        LABS:                        7.5    23.5  )-----------( 482      ( 01 Apr 2019 07:04 )             22.8     04-01    140  |  98  |  36<H>  ----------------------------<  166<H>  3.2<L>   |  28  |  0.31<L>    Ca    8.2<L>      01 Apr 2019 07:02              MICROBIOLOGY:    RECENT CULTURES:  03-31 @ 08:18 .Blood Blood-Peripheral                No growth to date.    03-29 @ 03:53 .Blood Blood-Peripheral   PCR    Growth in anaerobic bottle: Gram Positive Cocci in Clusters    Blood Culture PCR  Blood Culture PCR     No growth to date.    03-29 @ 03:17 .Urine Catheterized                No growth          RESPIRATORY CULTURES:              RADIOLOGY & ADDITIONAL STUDIES:        Pager [phone]  After 5 pm/weekends or if no response :[phone]

## 2019-04-02 NOTE — CHART NOTE - NSCHARTNOTEFT_GEN_A_CORE
MYRIAM HEATH    Notified by Radiology xray       Interventions taken                           Laura HARRISC MYRIAM HEATH    Notified by Radiology xray   IMPRESSION: The study was limited by the patient's clinical status.  The bones appear demineralized, limiting evaluation. There is a displaced   fracture of the distal left femur. The fracture isolates the medial   femoral condyle and lateral femoral condyle. There is posterior   displacement of distal fragments relative to the femoral shaft.   Redemonstrated nonspecific chondroid lesion in the distal femoral shaft.   While this could represent an enchondroma, other chondroid lesions are   not excluded. If clinically indicated, correlation with cross-sectional   imaging could be performed. Extensive soft tissue swelling about the left   knee. Evaluation for knee joint effusion is limited due to distorted   anatomy and overlapping osseous structures. Vascular calcifications.  Vital Signs Last 24 Hrs  T(C): 36.7 (02 Apr 2019 15:16), Max: 38.9 (01 Apr 2019 16:29)  T(F): 98 (02 Apr 2019 15:16), Max: 102 (01 Apr 2019 16:29)  HR: 101 (02 Apr 2019 15:16) (89 - 104)  BP: 122/67 (02 Apr 2019 15:16) (94/53 - 148/82)  BP(mean): --  RR: 18 (02 Apr 2019 15:16) (18 - 18)  SpO2: 97% (02 Apr 2019 15:16) (94% - 97%)                        10.4   22.1  )-----------( 426      ( 02 Apr 2019 09:31 )             31.9   04-02    141  |  95<L>  |  31<H>  ----------------------------<  226<H>  2.6<LL>   |  31  |  <0.30<L>    Ca    8.1<L>      02 Apr 2019 09:31  Ext warm to touch   positive pulses   HPI:  This patient is a 68yoF with PMH of advanced dementia (nonverbal, dysphagia with PEG tube, functional quadriplegic, chronic gavin catheter), sacral stage 4 pressure ulcer, heel pressure ulcers, asthma on chronic prednisone., hLd, CVA, UC (in remission for 30years), 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 History was provided by patient's daughters, Tere and Angie at bedside. Patient has been having persistent fever at home up to 103F. She has appeared more lethargic for the last 2 days and has had labored breathing, but no cough. She has loose nonbloody stools. No recent emesis, melena or hematuria. Urine output via gavin catheter is normal yellow appearance and had "good output." Patient gets regular wound care but has persistent ulcers. Because of persistent fevers, pt was brought to ED.   This patient was previously hospitalized from 2/26-3/18/2019 for fevers, found to have septic shock 2/2 UTI vs sacral ulcer vs PNA, requiring pressor support and MICU stay. 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 Offer was made to change the spacer and get cultures, however at that time, the  did not want to put the patient through surgery. Pt completed a course of meropenem, and was resumed on suppressive minocycline.   In the ED, T 100.8 Rectal  , /63, RR 14, SpO2 98%RA  MEETS SIRS CRITERIA based on HR and temperature.   She was given IVNS 1.5L, acetaminophen 650mg and meropenem 1g. (24 Mar 2019 20:54)    displaced   #fracture of the distal left femur    Interventions taken   Orthopedic called for evaluated   Orthopedic seen patient place left leg in cast  monitor vital sign   monitor CBC Q 6hours  monitor for compartment syndrome   Vascular check   pain management   consult palliative care   Discussed with Dr Bolden above selene SHARMA MYRIAM HEATH    Notified by Radiology xray   IMPRESSION: The study was limited by the patient's clinical status.  The bones appear demineralized, limiting evaluation. There is a displaced   fracture of the distal left femur. The fracture isolates the medial   femoral condyle and lateral femoral condyle. There is posterior   displacement of distal fragments relative to the femoral shaft.   Redemonstrated nonspecific chondroid lesion in the distal femoral shaft.   While this could represent an enchondroma, other chondroid lesions are   not excluded. If clinically indicated, correlation with cross-sectional   imaging could be performed. Extensive soft tissue swelling about the left   knee. Evaluation for knee joint effusion is limited due to distorted   anatomy and overlapping osseous structures. Vascular calcifications.  Vital Signs Last 24 Hrs  T(C): 36.7 (02 Apr 2019 15:16), Max: 38.9 (01 Apr 2019 16:29)  T(F): 98 (02 Apr 2019 15:16), Max: 102 (01 Apr 2019 16:29)  HR: 101 (02 Apr 2019 15:16) (89 - 104)  BP: 122/67 (02 Apr 2019 15:16) (94/53 - 148/82)  BP(mean): --  RR: 18 (02 Apr 2019 15:16) (18 - 18)  SpO2: 97% (02 Apr 2019 15:16) (94% - 97%)                        10.4   22.1  )-----------( 426      ( 02 Apr 2019 09:31 )             31.9   04-02    141  |  95<L>  |  31<H>  ----------------------------<  226<H>  2.6<LL>   |  31  |  <0.30<L>    Ca    8.1<L>      02 Apr 2019 09:31  Ext warm to touch   positive pulses   HPI:  This patient is a 68yoF with PMH of advanced dementia (nonverbal, dysphagia with PEG tube, functional quadriplegic, chronic gavin catheter), sacral stage 4 pressure ulcer, heel pressure ulcers, asthma on chronic prednisone., hLd, CVA, UC (in remission for 30years), 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 History was provided by patient's daughters, Tere and Angie at bedside. Patient has been having persistent fever at home up to 103F. She has appeared more lethargic for the last 2 days and has had labored breathing, but no cough. She has loose nonbloody stools. No recent emesis, melena or hematuria. Urine output via gavin catheter is normal yellow appearance and had "good output." Patient gets regular wound care but has persistent ulcers. Because of persistent fevers, pt was brought to ED.   This patient was previously hospitalized from 2/26-3/18/2019 for fevers, found to have septic shock 2/2 UTI vs sacral ulcer vs PNA, requiring pressor support and MICU stay. 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 Offer was made to change the spacer and get cultures, however at that time, the  did not want to put the patient through surgery. Pt completed a course of meropenem, and was resumed on suppressive minocycline.   In the ED, T 100.8 Rectal  , /63, RR 14, SpO2 98%RA  MEETS SIRS CRITERIA based on HR and temperature.   She was given IVNS 1.5L, acetaminophen 650mg and meropenem 1g. (24 Mar 2019 20:54)    #Displaced   #fracture of the distal left femur  Interventions taken   Orthopedic called for evaluated   Orthopedic seen patient place left leg in cast  monitor vital sign [id number]   monitor CBC Q 6hours  HOLD Eliquis   monitor for compartment syndrome   Vascular check   pain management   consult palliative care   Discussed with Dr Bolden above plan                           Laura SHARMA

## 2019-04-02 NOTE — PROGRESS NOTE ADULT - SUBJECTIVE AND OBJECTIVE BOX
---___---___---___---___---___---___ ---___---___---___---___---___---___---___---___---___---                    <<<  M E D I C A L   A T T E N D I N G    F O L L O W    U P   N O T E  >>>    still swollen and getting worse with the left knee. xray done and pending  results      ---___---___---___---___---___---      <<<  MEDICATIONS:  >>>    MEDICATIONS  (STANDING):  ALBUTerol    90 MICROgram(s) HFA Inhaler 1 Puff(s) Inhalation every 4 hours  ALBUTerol/ipratropium for Nebulization 3 milliLiter(s) Nebulizer every 6 hours  apixaban 5 milliGRAM(s) Oral every 12 hours  ascorbic acid 500 milliGRAM(s) Oral daily  buDESOnide 160 MICROgram(s)/formoterol 4.5 MICROgram(s) Inhaler 2 Puff(s) Inhalation two times a day  clonazePAM Tablet 1.5 milliGRAM(s) Oral <User Schedule>  ergocalciferol 26729 Unit(s) Oral every week  famotidine    Tablet 20 milliGRAM(s) Oral daily  ferrous    sulfate Liquid 300 milliGRAM(s) Enteral Tube daily  gabapentin   Solution 300 milliGRAM(s) Oral two times a day  hydrocortisone sodium succinate Injectable 100 milliGRAM(s) IV Push every 8 hours  lisinopril 5 milliGRAM(s) Enteral Tube at bedtime  Medical Marijuana Oil 1 milliLiter(s) 1 milliLiter(s) Oral <User Schedule>  meropenem  IVPB 1000 milliGRAM(s) IV Intermittent every 12 hours  metoprolol tartrate 12.5 milliGRAM(s) Enteral Tube two times a day  midodrine 10 milliGRAM(s) Oral every 8 hours  multivitamin 1 Tablet(s) Oral daily  petrolatum white Ointment 1 Application(s) Topical three times a day  potassium chloride  10 mEq/100 mL IVPB 10 milliEquivalent(s) IV Intermittent every 1 hour  QUEtiapine 25 milliGRAM(s) Oral at bedtime  sodium chloride 0.9% Bolus 500 milliLiter(s) IV Bolus once  tiotropium 18 MICROgram(s) Capsule 1 Capsule(s) Inhalation daily  tiZANidine 4 milliGRAM(s) Oral <User Schedule>  vancomycin  IVPB 1000 milliGRAM(s) IV Intermittent every 24 hours      MEDICATIONS  (PRN):  acetaminophen    Suspension .. 650 milliGRAM(s) Oral every 6 hours PRN Temp greater or equal to 38C (100.4F), Mild Pain (1 - 3), Moderate Pain (4 - 6)  Dakins Solution - 1/4 Strength 1 Application(s) Topical daily PRN if dressing soiled  diphenhydrAMINE   Elixir 25 milliGRAM(s) Oral every 6 hours PRN Rash and/or Itching  nystatin Powder 1 Application(s) Topical two times a day PRN rash/itchiness  sodium chloride 0.65% Nasal 1 Spray(s) Both Nostrils three times a day PRN Nasal Congestion       ---___---___---___---___---___---     <<<REVIEW OF SYSTEM: >>>    GEN: no fever, no chills, no pain  RESP: no SOB, no cough, no sputum  CVS: no chest pain, no palpitations, no edema  GI: no abdominal pain, no nausea, no vomiting, no constipation, no diarrhea  : no dysurea, no frequency  NEURO: no headache, no dizziness  PSYCH: no depression, not anxious  Derm : no itching, no rash     ---___---___---___---___---___---          <<<  VITAL SIGNS: >>>    T(F): 98.4 (04-02-19 @ 06:54), Max: 102 (04-01-19 @ 16:29)  HR: 97 (04-02-19 @ 06:54) (89 - 104)  BP: 131/76 (04-02-19 @ 06:54) (94/53 - 148/82)  RR: 18 (04-02-19 @ 04:35) (18 - 18)  SpO2: 97% (04-02-19 @ 04:35) (94% - 97%)  Wt(kg): --  CAPILLARY BLOOD GLUCOSE        I&O's Summary    01 Apr 2019 07:01  -  02 Apr 2019 07:00  --------------------------------------------------------  IN: 450 mL / OUT: 3550 mL / NET: -3100 mL         ---___---___---___---___---___---                       PHYSICAL EXAM:    GEN: A&O X 0 , NAD , comfortable  HEENT: NCAT, PERRL, MMM, no scleral icterus, hearing intact  NECK: Supple, No JVD  CVS: S1S2 , regular , No M/R/G appreciated  PULM: CTA B/L,  no W/R/R appreciated  ABD.: soft. non tender, non distended,  bowel sounds present  Extrem: intact pulses , no edema noted swollen left knee and crepitus noted   Derm: No rash or ecchymosis noted  PSYCH: normal mood, no depression, not anxious     ---___---___---___---___---___---     <<<  LAB AND IMAGING: >>>                          10.4   22.1  )-----------( 426      ( 02 Apr 2019 09:31 )             31.9               04-02    141  |  95<L>  |  31<H>  ----------------------------<  226<H>  2.6<LL>   |  31  |  <0.30<L>    Ca    8.1<L>      02 Apr 2019 09:31                                 [All pertinent / recent available Imaging reports and other labs reviewed]     ---___---___---___---___---___---___ ---___---___---___---___---           <<<  A S S E S S M E N T   A N D   P L A N :  >>>    swollen left knee xray pending   anemia  lab improved now after 1 unit  sepsis continue iv abx as per id   asthma on budesonide  dememtia  stable with supportive care   agitation  on seroquel and klonopin   chronic pain continue medical mariuana for pain   eliquis for dvt prophylaxis      -GI/DVT Prophylaxis.    --------------------------------------------  Case discussed with   Education given on     >>______________________<<      Deniz Bolden .         phone   [phone]

## 2019-04-02 NOTE — PROGRESS NOTE ADULT - ASSESSMENT
68 yr old with advanced dementia , bed bound, contracted, cva, sp removal of infected hip due to mrsa with placement of space last fall.   Pt has been havening fevers for months.  I suspect there is ongoing Om of the hip site but changing the spacer is not a realistic option . chronic Om of the sacrum usually does not cause fevers and does not need prolonged ab therapy .       cultures negative to date  uc with yeast of doubtful significance    vanco to cover mrsa  can dc meropenem  after cultures , we cac decide on endpoint of ab therapy   discussed  with      reluctant to rescan   does not want mri  temp is down on vanco    if it stays down , we will need to place picc and give her a course  of vanco even though it will  not cure a chronic hip infection    or discharge on po Zyvox  discussed with  i detail   wbc and temp up lasr 24 hr  to continue to observe  Zyvox may interact with her Seroquel and other psychotropic drugs     no good options  chest xary is negative and meropenem added ;ast night pending additional cultures   bc negative   hope t o stop meropenem tomorrow and then discuss  plan

## 2019-04-02 NOTE — CONSULT NOTE ADULT - SUBJECTIVE AND OBJECTIVE BOX
HPI: 68yFemale w/ hx of dementia (non-verbal, with PEG, chronic gavin, bedbound for past 8 months), sacral ulcers, asthma on chronic prednisone, HLD, CVA, UA, HFrEF, and  R femoral neck fx s/p arina c/b periprosthetic fx s/p ORIF c/b MRSA infection s/p stage 1 revision TREVER. Patient was readmitted to the hospital on 3/24 with fevers. Has not been out of bed except for 2x prior to hospitalization with pablo lift. No known trauma, but patient was noted to have swollen L knee today and XR were done. Patient shakes head no when asked if in pain.     Review of systems: As per HPI, otherwise negative.     PAST MEDICAL & SURGICAL HISTORY:  CVA (cerebral vascular accident)  Fatty pancreas  Fatty liver  PNA (pneumonia)  Pulmonary HTN  IGT (impaired glucose tolerance)  Ulcerative colitis  Acid reflux  Anxiety  Depression  Mouth sores  HLD (hyperlipidemia)  Asthma  Humeral head fracture  H/O: hysterectomy  H/O cataract extraction, left  History of knee replacement    Family History: FAMILY HISTORY:  Family history of dementia (Grandparent)  Family history of colon cancer (Grandparent)    Medications: MEDICATIONS  (STANDING):  ALBUTerol    90 MICROgram(s) HFA Inhaler 1 Puff(s) Inhalation every 4 hours  ALBUTerol/ipratropium for Nebulization 3 milliLiter(s) Nebulizer every 6 hours  ascorbic acid 500 milliGRAM(s) Oral daily  buDESOnide 160 MICROgram(s)/formoterol 4.5 MICROgram(s) Inhaler 2 Puff(s) Inhalation two times a day  clonazePAM Tablet 1.5 milliGRAM(s) Oral <User Schedule>  ergocalciferol 70419 Unit(s) Oral every week  famotidine    Tablet 20 milliGRAM(s) Oral daily  ferrous    sulfate Liquid 300 milliGRAM(s) Enteral Tube daily  gabapentin   Solution 300 milliGRAM(s) Oral two times a day  hydrocortisone sodium succinate Injectable 100 milliGRAM(s) IV Push every 8 hours  lisinopril 5 milliGRAM(s) Enteral Tube at bedtime  Medical Marijuana Oil 1 milliLiter(s) 1 milliLiter(s) Oral <User Schedule>  meropenem  IVPB 1000 milliGRAM(s) IV Intermittent every 12 hours  metoprolol tartrate 12.5 milliGRAM(s) Enteral Tube two times a day  midodrine 10 milliGRAM(s) Oral every 8 hours  multivitamin 1 Tablet(s) Oral daily  petrolatum white Ointment 1 Application(s) Topical three times a day  QUEtiapine 25 milliGRAM(s) Oral at bedtime  sodium chloride 0.9% Bolus 500 milliLiter(s) IV Bolus once  tiotropium 18 MICROgram(s) Capsule 1 Capsule(s) Inhalation daily  tiZANidine 4 milliGRAM(s) Oral <User Schedule>  vancomycin  IVPB 1000 milliGRAM(s) IV Intermittent every 24 hours    MEDICATIONS  (PRN):  acetaminophen    Suspension .. 650 milliGRAM(s) Oral every 6 hours PRN Temp greater or equal to 38C (100.4F), Mild Pain (1 - 3), Moderate Pain (4 - 6)  Dakins Solution - 1/4 Strength 1 Application(s) Topical daily PRN if dressing soiled  diphenhydrAMINE   Elixir 25 milliGRAM(s) Oral every 6 hours PRN Rash and/or Itching  nystatin Powder 1 Application(s) Topical two times a day PRN rash/itchiness  sodium chloride 0.65% Nasal 1 Spray(s) Both Nostrils three times a day PRN Nasal Congestion    T(C): 36.7 (04-02-19 @ 15:16), Max: 38.9 (04-01-19 @ 16:29)  HR: 101 (04-02-19 @ 15:16) (89 - 104)  BP: 122/67 (04-02-19 @ 15:16) (94/53 - 148/82)  RR: 18 (04-02-19 @ 15:16) (18 - 18)  SpO2: 97% (04-02-19 @ 15:16) (94% - 97%)                        10.4   22.1  )-----------( 426      ( 02 Apr 2019 09:31 )             31.9     04-02    141  |  95<L>  |  31<H>  ----------------------------<  226<H>  2.6<LL>   |  31  |  <0.30<L>    Ca    8.1<L>      02 Apr 2019 09:31    EXAM:  Awake, Alert and in no acute distress  RLE: contracted  incision c/d/i, well healed, no drainage or erythema  patient non-compliant with detailed NV exam, WWP distally  LLE: contracted  knee swollen, skin intact  patient non-compliant with detailed NV exam, WWP distally    Imaging: XR L knee - transverse distal femur fracture with apex anterior displacement    A/P: This is a 68y Female with complicated PMHx who presents today with L distal femur fx    - NWB LLE in [facility]  - Will get fracture brace for patient  - After discussion with family and primary team, the goal of care is to keep the patient comfortable and surgery at this time with the high risks involved in this very sick patient is not in alignment with current family goals.  - FU XR BL femurs to visualize entire L femur and R femur where prior surgery was performed    Patient and imaging discussed with Dr. Candido Mcgraw MD

## 2019-04-03 LAB
ANION GAP SERPL CALC-SCNC: 15 MMOL/L — SIGNIFICANT CHANGE UP (ref 5–17)
APPEARANCE UR: CLEAR — SIGNIFICANT CHANGE UP
BILIRUB UR-MCNC: NEGATIVE — SIGNIFICANT CHANGE UP
BUN SERPL-MCNC: 39 MG/DL — HIGH (ref 7–23)
CALCIUM SERPL-MCNC: 8.1 MG/DL — LOW (ref 8.4–10.5)
CHLORIDE SERPL-SCNC: 99 MMOL/L — SIGNIFICANT CHANGE UP (ref 96–108)
CO2 SERPL-SCNC: 29 MMOL/L — SIGNIFICANT CHANGE UP (ref 22–31)
COLOR SPEC: YELLOW — SIGNIFICANT CHANGE UP
CREAT SERPL-MCNC: 0.31 MG/DL — LOW (ref 0.5–1.3)
CULTURE RESULTS: SIGNIFICANT CHANGE UP
DIFF PNL FLD: NEGATIVE — SIGNIFICANT CHANGE UP
GLUCOSE BLDC GLUCOMTR-MCNC: 213 MG/DL — HIGH (ref 70–99)
GLUCOSE SERPL-MCNC: 184 MG/DL — HIGH (ref 70–99)
GLUCOSE UR QL: ABNORMAL
HCT VFR BLD CALC: 32.2 % — LOW (ref 34.5–45)
HGB BLD-MCNC: 10.1 G/DL — LOW (ref 11.5–15.5)
KETONES UR-MCNC: NEGATIVE — SIGNIFICANT CHANGE UP
LEUKOCYTE ESTERASE UR-ACNC: NEGATIVE — SIGNIFICANT CHANGE UP
MCHC RBC-ENTMCNC: 28.7 PG — SIGNIFICANT CHANGE UP (ref 27–34)
MCHC RBC-ENTMCNC: 31.4 GM/DL — LOW (ref 32–36)
MCV RBC AUTO: 91.4 FL — SIGNIFICANT CHANGE UP (ref 80–100)
NITRITE UR-MCNC: NEGATIVE — SIGNIFICANT CHANGE UP
PH UR: 6 — SIGNIFICANT CHANGE UP (ref 5–8)
PLATELET # BLD AUTO: 442 K/UL — HIGH (ref 150–400)
POTASSIUM SERPL-MCNC: 4.7 MMOL/L — SIGNIFICANT CHANGE UP (ref 3.5–5.3)
POTASSIUM SERPL-SCNC: 4.7 MMOL/L — SIGNIFICANT CHANGE UP (ref 3.5–5.3)
PROT UR-MCNC: ABNORMAL
RBC # BLD: 3.53 M/UL — LOW (ref 3.8–5.2)
RBC # FLD: 17.1 % — HIGH (ref 10.3–14.5)
SODIUM SERPL-SCNC: 143 MMOL/L — SIGNIFICANT CHANGE UP (ref 135–145)
SP GR SPEC: 1.03 — HIGH (ref 1.01–1.02)
SPECIMEN SOURCE: SIGNIFICANT CHANGE UP
UROBILINOGEN FLD QL: NEGATIVE — SIGNIFICANT CHANGE UP
VANCOMYCIN TROUGH SERPL-MCNC: 12.6 UG/ML — SIGNIFICANT CHANGE UP (ref 10–20)
WBC # BLD: 18.5 K/UL — HIGH (ref 3.8–10.5)
WBC # FLD AUTO: 18.5 K/UL — HIGH (ref 3.8–10.5)

## 2019-04-03 PROCEDURE — 99232 SBSQ HOSP IP/OBS MODERATE 35: CPT

## 2019-04-03 RX ADMIN — LISINOPRIL 5 MILLIGRAM(S): 2.5 TABLET ORAL at 22:35

## 2019-04-03 RX ADMIN — MIDODRINE HYDROCHLORIDE 10 MILLIGRAM(S): 2.5 TABLET ORAL at 13:51

## 2019-04-03 RX ADMIN — GABAPENTIN 300 MILLIGRAM(S): 400 CAPSULE ORAL at 22:36

## 2019-04-03 RX ADMIN — Medication 1 APPLICATION(S): at 13:51

## 2019-04-03 RX ADMIN — Medication 650 MILLIGRAM(S): at 09:43

## 2019-04-03 RX ADMIN — Medication 3 MILLILITER(S): at 05:13

## 2019-04-03 RX ADMIN — BUDESONIDE AND FORMOTEROL FUMARATE DIHYDRATE 2 PUFF(S): 160; 4.5 AEROSOL RESPIRATORY (INHALATION) at 09:20

## 2019-04-03 RX ADMIN — FAMOTIDINE 20 MILLIGRAM(S): 10 INJECTION INTRAVENOUS at 09:08

## 2019-04-03 RX ADMIN — Medication 100 MILLIGRAM(S): at 05:15

## 2019-04-03 RX ADMIN — Medication 1 APPLICATION(S): at 20:00

## 2019-04-03 RX ADMIN — Medication 3 MILLILITER(S): at 20:10

## 2019-04-03 RX ADMIN — MEROPENEM 100 MILLIGRAM(S): 1 INJECTION INTRAVENOUS at 05:12

## 2019-04-03 RX ADMIN — Medication 12.5 MILLIGRAM(S): at 09:08

## 2019-04-03 RX ADMIN — Medication 100 MILLIGRAM(S): at 22:37

## 2019-04-03 RX ADMIN — Medication 250 MILLIGRAM(S): at 12:32

## 2019-04-03 RX ADMIN — Medication 40 MILLIEQUIVALENT(S): at 01:45

## 2019-04-03 RX ADMIN — TIZANIDINE 4 MILLIGRAM(S): 4 TABLET ORAL at 23:39

## 2019-04-03 RX ADMIN — Medication 650 MILLIGRAM(S): at 20:08

## 2019-04-03 RX ADMIN — Medication 100 MILLIGRAM(S): at 13:51

## 2019-04-03 RX ADMIN — Medication 1 TABLET(S): at 09:08

## 2019-04-03 RX ADMIN — Medication 650 MILLIGRAM(S): at 11:00

## 2019-04-03 RX ADMIN — GABAPENTIN 300 MILLIGRAM(S): 400 CAPSULE ORAL at 09:08

## 2019-04-03 RX ADMIN — QUETIAPINE FUMARATE 25 MILLIGRAM(S): 200 TABLET, FILM COATED ORAL at 22:36

## 2019-04-03 RX ADMIN — Medication 3 MILLILITER(S): at 12:33

## 2019-04-03 RX ADMIN — Medication 1 APPLICATION(S): at 05:09

## 2019-04-03 RX ADMIN — Medication 650 MILLIGRAM(S): at 20:38

## 2019-04-03 RX ADMIN — TIZANIDINE 4 MILLIGRAM(S): 4 TABLET ORAL at 09:20

## 2019-04-03 RX ADMIN — Medication 1.5 MILLIGRAM(S): at 22:36

## 2019-04-03 RX ADMIN — Medication 12.5 MILLIGRAM(S): at 22:36

## 2019-04-03 RX ADMIN — Medication 500 MILLIGRAM(S): at 09:08

## 2019-04-03 RX ADMIN — Medication 300 MILLIGRAM(S): at 09:08

## 2019-04-03 RX ADMIN — Medication 1 APPLICATION(S): at 22:45

## 2019-04-03 NOTE — CHART NOTE - NSCHARTNOTEFT_GEN_A_CORE
Patient is a 68y old  Female who presents with a chief complaint of fevers (02 Apr 2019 16:19)  Informed by RN of noted 7 beats of PAT on tele.  Tele strip reviewed, appears 7 beats of PAT vs. SVT rate > 150 bpm  Per RN, pt was resting in bed, asymptomatic, VSS  Currently pt seen asleep,  bpm in sinus on tele    Noted to be hypokalemic at 3.3         Vital Signs Last 24 Hrs  T(C): 37 (02 Apr 2019 21:13), Max: 37 (02 Apr 2019 21:13)  T(F): 98.6 (02 Apr 2019 21:13), Max: 98.6 (02 Apr 2019 21:13)  HR: 106 (02 Apr 2019 21:13) (89 - 106)  BP: 137/75 (02 Apr 2019 21:13) (94/53 - 145/76)  BP(mean): --  RR: 18 (02 Apr 2019 21:13) (18 - 18)  SpO2: 96% (02 Apr 2019 21:13) (94% - 98%)        Labs:                        10.8   21.2  )-----------( 401      ( 02 Apr 2019 18:41 )             32.0     04-02    141  |  97  |  34<H>  ----------------------------<  221<H>  3.3<L>   |  31  |  0.31<L>    Ca    8.1<L>      02 Apr 2019 18:41      Radiology:    Assessment & Plan:  HPI:  This patient is a 68yoF with PMH of advanced dementia (nonverbal, dysphagia with PEG tube, functional quadriplegic, chronic gavin catheter), sacral stage 4 pressure ulcer, heel pressure ulcers, asthma on chronic prednisone., hLd, CVA, UC (in remission for 30years), 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 History was provided by patient's daughters, Tere and Angie at bedside. Patient has been having persistent fever at home up to 103F. She has appeared more lethargic for the last 2 days and has had labored breathing, but no cough. She has loose nonbloody stools. No recent emesis, melena or hematuria. Urine output via gavin catheter is normal yellow appearance and had "good output." Patient gets regular wound care but has persistent ulcers. Because of persistent fevers, pt was brought to ED.   This patient was previously hospitalized from 2/26-3/18/2019 for fevers, found to have septic shock 2/2 UTI vs sacral ulcer vs PNA, requiring pressor support and MICU stay. 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 Offer was made to change the spacer and get cultures, however at that time, the  did not want to put the patient through surgery. Pt completed a course of meropenem, and was resumed on suppressive minocycline.   In the ED, T 100.8 Rectal  , /63, RR 14, SpO2 98%RA  MEETS SIRS CRITERIA based on HR and temperature.   She was given IVNS 1.5L, acetaminophen 650mg and meropenem 1g. (24 Mar 2019 20:54)    Patient noted to have an episode of PAT vs. SVT range of 7 beats, otherwise in ST currently at 106 bpm  Pt hemodynamically stable, resting comfortably at this time    PLAN:  >Continue with telemetry  >Continue present medication regimen  >Given K 40 Meq per PEG X 2 dose           -Maintain K > 4  >Rpt CBC, BMP in AM  >Will endorse to primary team in AM        Ginny High PA-C  Dept of Medicine

## 2019-04-03 NOTE — PROGRESS NOTE ADULT - SUBJECTIVE AND OBJECTIVE BOX
---___---___---___---___---___---___ ---___---___---___---___---___---___---___---___---___---                    <<<  M E D I C A L   A T T E N D I N G    F O L L O W    U P   N O T E  >>>    patient with fracture of the distal femur now    ---___---___---___---___---___---      <<<  MEDICATIONS:  >>>    MEDICATIONS  (STANDING):  ALBUTerol    90 MICROgram(s) HFA Inhaler 1 Puff(s) Inhalation every 4 hours  ALBUTerol/ipratropium for Nebulization 3 milliLiter(s) Nebulizer every 6 hours  ascorbic acid 500 milliGRAM(s) Oral daily  buDESOnide 160 MICROgram(s)/formoterol 4.5 MICROgram(s) Inhaler 2 Puff(s) Inhalation two times a day  clonazePAM Tablet 1.5 milliGRAM(s) Oral <User Schedule>  ergocalciferol 27996 Unit(s) Oral every week  famotidine    Tablet 20 milliGRAM(s) Oral daily  ferrous    sulfate Liquid 300 milliGRAM(s) Enteral Tube daily  gabapentin   Solution 300 milliGRAM(s) Oral two times a day  hydrocortisone sodium succinate Injectable 100 milliGRAM(s) IV Push every 8 hours  lisinopril 5 milliGRAM(s) Enteral Tube at bedtime  meropenem  IVPB 1000 milliGRAM(s) IV Intermittent every 12 hours  metoprolol tartrate 12.5 milliGRAM(s) Enteral Tube two times a day  midodrine 10 milliGRAM(s) Oral every 8 hours  multivitamin 1 Tablet(s) Oral daily  petrolatum white Ointment 1 Application(s) Topical three times a day  QUEtiapine 25 milliGRAM(s) Oral at bedtime  sodium chloride 0.9% Bolus 500 milliLiter(s) IV Bolus once  tiotropium 18 MICROgram(s) Capsule 1 Capsule(s) Inhalation daily  tiZANidine 4 milliGRAM(s) Oral <User Schedule>  vancomycin  IVPB 1000 milliGRAM(s) IV Intermittent every 24 hours      MEDICATIONS  (PRN):  acetaminophen    Suspension .. 650 milliGRAM(s) Oral every 6 hours PRN Temp greater or equal to 38C (100.4F), Mild Pain (1 - 3), Moderate Pain (4 - 6)  Dakins Solution - 1/4 Strength 1 Application(s) Topical daily PRN if dressing soiled  diphenhydrAMINE   Elixir 25 milliGRAM(s) Oral every 6 hours PRN Rash and/or Itching  nystatin Powder 1 Application(s) Topical two times a day PRN rash/itchiness  sodium chloride 0.65% Nasal 1 Spray(s) Both Nostrils three times a day PRN Nasal Congestion       ---___---___---___---___---___---     <<<REVIEW OF SYSTEM: >>>    unable to obtain      ---___---___---___---___---___---          <<<  VITAL SIGNS: >>>    T(F): 97.6 (04-03-19 @ 05:02), Max: 98.6 (04-02-19 @ 21:13)  HR: 106 (04-03-19 @ 05:02) (101 - 106)  BP: 149/79 (04-03-19 @ 05:02) (122/67 - 149/79)  RR: 20 (04-03-19 @ 05:02) (18 - 20)  SpO2: 94% (04-03-19 @ 05:02) (94% - 98%)  Wt(kg): --  CAPILLARY BLOOD GLUCOSE        I&O's Summary    02 Apr 2019 07:01  -  03 Apr 2019 07:00  --------------------------------------------------------  IN: 290 mL / OUT: 1250 mL / NET: -960 mL         ---___---___---___---___---___---                       PHYSICAL EXAM:    GEN: A&O X 0 , NAD , comfortable  HEENT: NCAT, PERRL, MMM, no scleral icterus, hearing intact  NECK: Supple, No JVD  CVS: S1S2 , regular , No M/R/G appreciated  PULM: CTA B/L,  no W/R/R appreciated  ABD.: soft. non tender, non distended,  bowel sounds present peg noted   Extrem: swelling and crepitus noted to the left knee   Derm: sacral decubitus noted         ---___---___---___---___---___---     <<<  LAB AND IMAGING: >>>                          10.1   18.5  )-----------( 442      ( 03 Apr 2019 06:48 )             32.2               04-02    141  |  97  |  34<H>  ----------------------------<  221<H>  3.3<L>   |  31  |  0.31<L>    Ca    8.1<L>      02 Apr 2019 18:41                                 [All pertinent / recent available Imaging reports and other labs reviewed]     ---___---___---___---___---___---___ ---___---___---___---___---           <<<  A S S E S S M E N T   A N D   P L A N :  >>>    distal femur fracture   sepsis  h/o dvt   agitation   asthma   dementia  GI/DVT Prophylaxis.  chf 20%ef   continue iv abx wbc trending down  discussed the possibilty of surgery with orthopedics and the  is well aware . that due to her multiple comorbid conditionand the fact thatshe has nto walked in nearly a year. the surgery will likey be very difficult on the patient. therfore orthopedics sugested a brace and supportive care. patient will be placed on scd to reduce the chance of bleeding in the area  --------------------------------------------  Case discussed with   Education given on     >>______________________<<      Deniz Bolden .         phone   [phone]

## 2019-04-03 NOTE — PROGRESS NOTE ADULT - ASSESSMENT
68 yr old with advanced dementia , bed bound, contracted, cva, sp removal of infected hip due to mrsa with placement of space last fall.   Pt has been havening fevers for months.  I suspect there is ongoing Om of the hip site but changing the spacer is not a realistic option . chronic Om of the sacrum usually does not cause fevers and does not need prolonged ab therapy .       cultures negative to date  uc with yeast of doubtful significance    vanco to cover mrsa  can dc meropenem  after cultures , we cac decide on endpoint of ab therapy   discussed  with      reluctant to rescan   does not want mri  temp is down on vanco    if it stays down , we will need to place picc and give her a course  of vanco even though it will  not cure a chronic hip infection         no good options  discussed with   pathologic fracture noted due to contractures and osteoporosis    will place picc line and plan several weeks of vanco realizing she may still have fever.

## 2019-04-03 NOTE — CHART NOTE - NSCHARTNOTEFT_GEN_A_CORE
Pt seen and examined earlier today for fevers .   Appeared Non toxic   reviewed ID notes and c/s , will pan c/s Pt .   Cooling measures , c/w Vanco   f/u cultures.   Lazarus Pierre NP-C [id number]

## 2019-04-03 NOTE — PROGRESS NOTE ADULT - SUBJECTIVE AND OBJECTIVE BOX
infectious diseases progress note:    Patient is a 68y old  Female who presents with a chief complaint of fevers (03 Apr 2019 07:12)        Fever             Allergies    ASA; dye contrast (Anaphylaxis)  aspirin (Short breath)  divalproex sodium (Other (Unknown))  Haldol (Other (Unknown))  penicillin (Short breath; Rash)  sulfa drugs (Short breath; Rash)  vancomycin (Rash; Urticaria; Hives)  Xanax (Other (Unknown))    Intolerances        ANTIBIOTICS/RELEVANT:  antimicrobials  meropenem  IVPB 1000 milliGRAM(s) IV Intermittent every 12 hours  vancomycin  IVPB 1000 milliGRAM(s) IV Intermittent every 24 hours    immunologic:    OTHER:  acetaminophen    Suspension .. 650 milliGRAM(s) Oral every 6 hours PRN  ALBUTerol    90 MICROgram(s) HFA Inhaler 1 Puff(s) Inhalation every 4 hours  ALBUTerol/ipratropium for Nebulization 3 milliLiter(s) Nebulizer every 6 hours  ascorbic acid 500 milliGRAM(s) Oral daily  buDESOnide 160 MICROgram(s)/formoterol 4.5 MICROgram(s) Inhaler 2 Puff(s) Inhalation two times a day  clonazePAM Tablet 1.5 milliGRAM(s) Oral <User Schedule>  Dakins Solution - 1/4 Strength 1 Application(s) Topical daily PRN  diphenhydrAMINE   Elixir 25 milliGRAM(s) Oral every 6 hours PRN  ergocalciferol 50113 Unit(s) Oral every week  famotidine    Tablet 20 milliGRAM(s) Oral daily  ferrous    sulfate Liquid 300 milliGRAM(s) Enteral Tube daily  gabapentin   Solution 300 milliGRAM(s) Oral two times a day  hydrocortisone sodium succinate Injectable 100 milliGRAM(s) IV Push every 8 hours  lisinopril 5 milliGRAM(s) Enteral Tube at bedtime  metoprolol tartrate 12.5 milliGRAM(s) Enteral Tube two times a day  midodrine 10 milliGRAM(s) Oral every 8 hours  multivitamin 1 Tablet(s) Oral daily  nystatin Powder 1 Application(s) Topical two times a day PRN  petrolatum white Ointment 1 Application(s) Topical three times a day  QUEtiapine 25 milliGRAM(s) Oral at bedtime  sodium chloride 0.65% Nasal 1 Spray(s) Both Nostrils three times a day PRN  sodium chloride 0.9% Bolus 500 milliLiter(s) IV Bolus once  tiotropium 18 MICROgram(s) Capsule 1 Capsule(s) Inhalation daily  tiZANidine 4 milliGRAM(s) Oral <User Schedule>      Objective:  Vital Signs Last 24 Hrs  T(C): 36.4 (03 Apr 2019 05:02), Max: 37 (02 Apr 2019 21:13)  T(F): 97.6 (03 Apr 2019 05:02), Max: 98.6 (02 Apr 2019 21:13)  HR: 106 (03 Apr 2019 05:02) (101 - 106)  BP: 149/79 (03 Apr 2019 05:02) (122/67 - 149/79)  BP(mean): --  RR: 20 (03 Apr 2019 05:02) (18 - 20)  SpO2: 94% (03 Apr 2019 05:02) (94% - 98%)       Eyes:JACQUELIN, EOMI  Ear/Nose/Throat: no oral lesion, no sinus tenderness on percussion	  Neck:no JVD, no lymphadenopathy, supple  Respiratory: CTA maryjane  Cardiovascular: S1S2 RRR, no murmurs  Gastrointestinal:soft, (+) BS, no HSM  Extremities :contracted       LABS:                        10.1   18.5  )-----------( 442      ( 03 Apr 2019 06:48 )             32.2     04-03    143  |  99  |  39<H>  ----------------------------<  184<H>  4.7   |  29  |  0.31<L>    Ca    8.1<L>      03 Apr 2019 06:48              MICROBIOLOGY:    RECENT CULTURES:  03-31 @ 08:18 .Blood Blood-Peripheral                No growth to date.    03-29 @ 03:53 .Blood Blood-Peripheral   PCR    Growth in anaerobic bottle: Gram Positive Cocci in Clusters    Blood Culture PCR  Blood Culture PCR     No growth at 5 days.    03-29 @ 03:17 .Urine Catheterized                No growth          RESPIRATORY CULTURES:              RADIOLOGY & ADDITIONAL STUDIES:        Pager [phone]  After 5 pm/weekends or if no response :[phone]

## 2019-04-04 ENCOUNTER — APPOINTMENT (OUTPATIENT)
Dept: WOUND CARE | Facility: CLINIC | Age: 69
End: 2019-04-04

## 2019-04-04 LAB
BASOPHILS # BLD AUTO: 0 K/UL — SIGNIFICANT CHANGE UP (ref 0–0.2)
CULTURE RESULTS: NO GROWTH — SIGNIFICANT CHANGE UP
EOSINOPHIL # BLD AUTO: 0.1 K/UL — SIGNIFICANT CHANGE UP (ref 0–0.5)
HCT VFR BLD CALC: 33.9 % — LOW (ref 34.5–45)
HGB BLD-MCNC: 10.6 G/DL — LOW (ref 11.5–15.5)
LYMPHOCYTES # BLD AUTO: 1.2 K/UL — SIGNIFICANT CHANGE UP (ref 1–3.3)
LYMPHOCYTES # BLD AUTO: 6 % — LOW (ref 13–44)
MCHC RBC-ENTMCNC: 29.1 PG — SIGNIFICANT CHANGE UP (ref 27–34)
MCHC RBC-ENTMCNC: 31.2 GM/DL — LOW (ref 32–36)
MCV RBC AUTO: 93.5 FL — SIGNIFICANT CHANGE UP (ref 80–100)
MONOCYTES # BLD AUTO: 0.8 K/UL — SIGNIFICANT CHANGE UP (ref 0–0.9)
MONOCYTES NFR BLD AUTO: 7 % — SIGNIFICANT CHANGE UP (ref 2–14)
NEUTROPHILS # BLD AUTO: 18.8 K/UL — HIGH (ref 1.8–7.4)
NEUTROPHILS NFR BLD AUTO: 84 % — HIGH (ref 43–77)
PLATELET # BLD AUTO: 408 K/UL — HIGH (ref 150–400)
RBC # BLD: 3.63 M/UL — LOW (ref 3.8–5.2)
RBC # FLD: 17.5 % — HIGH (ref 10.3–14.5)
SPECIMEN SOURCE: SIGNIFICANT CHANGE UP
WBC # BLD: 21 K/UL — HIGH (ref 3.8–10.5)
WBC # FLD AUTO: 21 K/UL — HIGH (ref 3.8–10.5)

## 2019-04-04 PROCEDURE — 99232 SBSQ HOSP IP/OBS MODERATE 35: CPT

## 2019-04-04 RX ORDER — HEPARIN SODIUM 5000 [USP'U]/ML
3000 INJECTION INTRAVENOUS; SUBCUTANEOUS EVERY 6 HOURS
Qty: 0 | Refills: 0 | Status: DISCONTINUED | OUTPATIENT
Start: 2019-04-04 | End: 2019-04-05

## 2019-04-04 RX ORDER — HEPARIN SODIUM 5000 [USP'U]/ML
1500 INJECTION INTRAVENOUS; SUBCUTANEOUS EVERY 6 HOURS
Qty: 0 | Refills: 0 | Status: DISCONTINUED | OUTPATIENT
Start: 2019-04-04 | End: 2019-04-05

## 2019-04-04 RX ORDER — HEPARIN SODIUM 5000 [USP'U]/ML
INJECTION INTRAVENOUS; SUBCUTANEOUS
Qty: 25000 | Refills: 0 | Status: DISCONTINUED | OUTPATIENT
Start: 2019-04-04 | End: 2019-04-05

## 2019-04-04 RX ADMIN — Medication 3 MILLILITER(S): at 05:43

## 2019-04-04 RX ADMIN — GABAPENTIN 300 MILLIGRAM(S): 400 CAPSULE ORAL at 09:30

## 2019-04-04 RX ADMIN — Medication 3 MILLILITER(S): at 13:13

## 2019-04-04 RX ADMIN — Medication 250 MILLIGRAM(S): at 09:33

## 2019-04-04 RX ADMIN — Medication 3 MILLILITER(S): at 23:43

## 2019-04-04 RX ADMIN — Medication 12.5 MILLIGRAM(S): at 21:55

## 2019-04-04 RX ADMIN — BUDESONIDE AND FORMOTEROL FUMARATE DIHYDRATE 2 PUFF(S): 160; 4.5 AEROSOL RESPIRATORY (INHALATION) at 09:31

## 2019-04-04 RX ADMIN — Medication 1 APPLICATION(S): at 13:16

## 2019-04-04 RX ADMIN — Medication 1 APPLICATION(S): at 05:40

## 2019-04-04 RX ADMIN — Medication 500 MILLIGRAM(S): at 13:13

## 2019-04-04 RX ADMIN — QUETIAPINE FUMARATE 25 MILLIGRAM(S): 200 TABLET, FILM COATED ORAL at 21:55

## 2019-04-04 RX ADMIN — Medication 12.5 MILLIGRAM(S): at 09:31

## 2019-04-04 RX ADMIN — HEPARIN SODIUM 700 UNIT(S)/HR: 5000 INJECTION INTRAVENOUS; SUBCUTANEOUS at 17:21

## 2019-04-04 RX ADMIN — BUDESONIDE AND FORMOTEROL FUMARATE DIHYDRATE 2 PUFF(S): 160; 4.5 AEROSOL RESPIRATORY (INHALATION) at 21:55

## 2019-04-04 RX ADMIN — Medication 1 TABLET(S): at 13:16

## 2019-04-04 RX ADMIN — FAMOTIDINE 20 MILLIGRAM(S): 10 INJECTION INTRAVENOUS at 13:14

## 2019-04-04 RX ADMIN — Medication 100 MILLIGRAM(S): at 05:45

## 2019-04-04 RX ADMIN — TIZANIDINE 4 MILLIGRAM(S): 4 TABLET ORAL at 21:55

## 2019-04-04 RX ADMIN — Medication 3 MILLILITER(S): at 17:30

## 2019-04-04 RX ADMIN — NYSTATIN CREAM 1 APPLICATION(S): 100000 CREAM TOPICAL at 17:30

## 2019-04-04 RX ADMIN — Medication 1 APPLICATION(S): at 23:34

## 2019-04-04 RX ADMIN — Medication 100 MILLIGRAM(S): at 13:15

## 2019-04-04 RX ADMIN — Medication 300 MILLIGRAM(S): at 13:14

## 2019-04-04 RX ADMIN — LISINOPRIL 5 MILLIGRAM(S): 2.5 TABLET ORAL at 21:55

## 2019-04-04 RX ADMIN — MIDODRINE HYDROCHLORIDE 10 MILLIGRAM(S): 2.5 TABLET ORAL at 13:14

## 2019-04-04 RX ADMIN — GABAPENTIN 300 MILLIGRAM(S): 400 CAPSULE ORAL at 21:55

## 2019-04-04 RX ADMIN — Medication 100 MILLIGRAM(S): at 21:55

## 2019-04-04 RX ADMIN — MIDODRINE HYDROCHLORIDE 10 MILLIGRAM(S): 2.5 TABLET ORAL at 05:47

## 2019-04-04 RX ADMIN — Medication 1.5 MILLIGRAM(S): at 21:55

## 2019-04-04 RX ADMIN — TIZANIDINE 4 MILLIGRAM(S): 4 TABLET ORAL at 09:31

## 2019-04-04 NOTE — PROGRESS NOTE ADULT - SUBJECTIVE AND OBJECTIVE BOX
INTERVAL HPI/OVERNIGHT EVENTS: Seen and examined with grand daughter in room . Feeling fine.   Vital Signs Last 24 Hrs  T(C): 36.8 (2019 12:48), Max: 39.4 (2019 19:47)  T(F): 98.2 (2019 12:48), Max: 103 (2019 19:47)  HR: 87 (2019 12:48) (87 - 108)  BP: 146/83 (2019 12:48) (115/70 - 163/84)  BP(mean): --  RR: 18 (2019 12:48) (18 - 18)  SpO2: 95% (2019 12:48) (95% - 97%)  I&O's Summary    2019 07:01  -  2019 07:00  --------------------------------------------------------  IN: 300 mL / OUT: 1400 mL / NET: -1100 mL      MEDICATIONS  (STANDING):  ALBUTerol    90 MICROgram(s) HFA Inhaler 1 Puff(s) Inhalation every 4 hours  ALBUTerol/ipratropium for Nebulization 3 milliLiter(s) Nebulizer every 6 hours  ascorbic acid 500 milliGRAM(s) Oral daily  buDESOnide 160 MICROgram(s)/formoterol 4.5 MICROgram(s) Inhaler 2 Puff(s) Inhalation two times a day  clonazePAM Tablet 1.5 milliGRAM(s) Oral <User Schedule>  ergocalciferol 56908 Unit(s) Oral every week  famotidine    Tablet 20 milliGRAM(s) Oral daily  ferrous    sulfate Liquid 300 milliGRAM(s) Enteral Tube daily  gabapentin   Solution 300 milliGRAM(s) Oral two times a day  hydrocortisone sodium succinate Injectable 100 milliGRAM(s) IV Push every 8 hours  lisinopril 5 milliGRAM(s) Enteral Tube at bedtime  metoprolol tartrate 12.5 milliGRAM(s) Enteral Tube two times a day  midodrine 10 milliGRAM(s) Oral every 8 hours  multivitamin 1 Tablet(s) Oral daily  petrolatum white Ointment 1 Application(s) Topical three times a day  QUEtiapine 25 milliGRAM(s) Oral at bedtime  sodium chloride 0.9% Bolus 500 milliLiter(s) IV Bolus once  tiotropium 18 MICROgram(s) Capsule 1 Capsule(s) Inhalation daily  tiZANidine 4 milliGRAM(s) Oral <User Schedule>  vancomycin  IVPB 1000 milliGRAM(s) IV Intermittent every 24 hours    MEDICATIONS  (PRN):  acetaminophen    Suspension .. 650 milliGRAM(s) Oral every 6 hours PRN Temp greater or equal to 38C (100.4F), Mild Pain (1 - 3), Moderate Pain (4 - 6)  Dakins Solution - 1/4 Strength 1 Application(s) Topical daily PRN if dressing soiled  diphenhydrAMINE   Elixir 25 milliGRAM(s) Oral every 6 hours PRN Rash and/or Itching  nystatin Powder 1 Application(s) Topical two times a day PRN rash/itchiness  sodium chloride 0.65% Nasal 1 Spray(s) Both Nostrils three times a day PRN Nasal Congestion    LABS:                        10.6   21.0  )-----------( 408      ( 2019 06:27 )             33.9     04-03    143  |  99  |  39<H>  ----------------------------<  184<H>  4.7   |  29  |  0.31<L>    Ca    8.1<L>      2019 06:48        Urinalysis Basic - ( 2019 14:59 )    Color: Yellow / Appearance: Clear / S.028 / pH: x  Gluc: x / Ketone: Negative  / Bili: Negative / Urobili: Negative   Blood: x / Protein: 30 mg/dL / Nitrite: Negative   Leuk Esterase: Negative / RBC: 3 /hpf / WBC 4 /HPF   Sq Epi: x / Non Sq Epi: 1 / Bacteria: Negative      CAPILLARY BLOOD GLUCOSE      POCT Blood Glucose.: 213 mg/dL (2019 18:19)        Urinalysis Basic - ( 2019 14:59 )    Color: Yellow / Appearance: Clear / S.028 / pH: x  Gluc: x / Ketone: Negative  / Bili: Negative / Urobili: Negative   Blood: x / Protein: 30 mg/dL / Nitrite: Negative   Leuk Esterase: Negative / RBC: 3 /hpf / WBC 4 /HPF   Sq Epi: x / Non Sq Epi: 1 / Bacteria: Negative        Consultant(s) Notes Reviewed:  [x ] YES  [ ] NO    PHYSICAL EXAM:  GENERAL: NAD, not in any distress ,  HEAD:  Atraumatic, Normocephalic  EYES: EOMI, PERRLA, conjunctiva and sclera clear  NECK: Supple, No JVD, Normal thyroid  NERVOUS SYSTEM:  Alert & contracted   CHEST/LUNG: Good air entry bilateral with no  rales, rhonchi, wheezing, or rubs  HEART: Regular rate and rhythm; No murmurs, rubs, or gallops  ABDOMEN: Soft, Nontender, Nondistended; Bowel sounds present. PEG+  EXTREMITIES:  contracted .  Sacral decubitus     Care Discussed with Consultants/Other Providers [ x] YES  [ ] NO

## 2019-04-04 NOTE — PROGRESS NOTE ADULT - SUBJECTIVE AND OBJECTIVE BOX
SUBJECTIVE: Pt seen, chart reviewed.   at bedside- all questions asked and answered to his satisfaction  No odor, redness, warmth, excess drainage noted  today no f/c/s  pt more alert, nonverbal, follows w/ eyes  (+)incontinent (+)sedentary. Offloading & pericare initiated upon admission & on going   ID suspects there is Osteo of the hip - but changing the spacer is not a realistic option & that chronic Om of the sacrum usually does not cause fevers and does not need prolonged ab therapy .      ROS pt unable to concern      vancomycin  IVPB 1000 milliGRAM(s) IV Intermittent every 24 hours      Physical Exam:  Vital Signs Last 24 Hrs  T(C): 36.8 (04 Apr 2019 12:48), Max: 39.4 (03 Apr 2019 19:47)  T(F): 98.2 (04 Apr 2019 12:48), Max: 103 (03 Apr 2019 19:47)  HR: 87 (04 Apr 2019 12:48) (87 - 108)  BP: 146/83 (04 Apr 2019 12:48) (115/70 - 163/84)  BP(mean): --  RR: 18 (04 Apr 2019 12:48) (18 - 18)  SpO2: 95% (04 Apr 2019 12:48) (95% - 97%)  General Appearance:  NAD, Alert, cachectic   Versa Care P500    Musculoskeletal/Vascular:   no BLE edema   BLE equally warm  no acute ischemia noted  BUE/BLE w/ contractures    Skin:  dry & frail    Lt Lateral malleolus 1.5cm x 0.8cm x 0cm DTI   (+fibrinous exudate/ slough  No drainage, odor, erythema, increased warmth, tenderness, induration, fluctuance    Stage 4 Sacral pressure injury  9cm x 9.5cm x 3.1cm w/ undermining 7-1 o'clock w/ greatest at 11 of 4.8cm  Mostly moist & granular  scant  nonviable tissue  (+) serosanguinous drainage    Bilateral ischium w/ partial thickness pink wounds  healing w/ reepithelialization  No odor, erythema, increased warmth, tenderness, induration, fluctuance      LABS:                        10.6   21.0  )-----------( 408      ( 04 Apr 2019 06:27 )             33.9

## 2019-04-04 NOTE — PROGRESS NOTE ADULT - ASSESSMENT
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 Patient has recurrent hospitalizations, has significant comorbidities. Prognosis is guarded.      Problem/Plan - 1:  ·  Problem:  Prosthetic hip infection with OM .  Plan: Awaiting PICC line and IV abxs. ID helping.      Problem/Plan - 2:  ·  Problem: SIRS (systemic inflammatory response syndrome).  Plan: Resolved with abxs.      Problem/Plan - 3:  ·  Problem: Chronic systolic heart failure.  Plan: Stable.      Problem/Plan - 4:  ·  Problem: Pressure injury of sacral region, stage 4.  Plan: Wound care helping.      Problem/Plan - 5:  ·  Problem: Functional quadriplegia.  Plan: Patient is functionally quadriplegic. and requires turning.  Given extent of patient's debility, recurrent hospitalizations, palliative evaluation and goals of care discussion may be helpful for patient's family to understand prognosis.      Problem/Plan - 6:  Problem: Dementia. Plan: Supportive care .    Problem/Plan - 7:  ·  Problem: History of DVT (deep vein thrombosis).  Plan: Patient is on apixaban 5mg q12h for chronic DVT.      Problem/Plan - 8:  ·  Problem: Asthma.  Plan:Stable .      Problem/Plan - 9:  ·  Problem: Iron deficiency anemia.  Plan: Start iron supplementation for known chronic anemia.  Pt has had no overt blood loss.      Problem/Plan - 10:  Problem: HTN (hypertension). Plan; Hold home antihypertensive agent- lisinopril.

## 2019-04-04 NOTE — PROVIDER CONTACT NOTE (OTHER) - ASSESSMENT
Pt awake but nonverbal. Pt warm to touch. Rectal temp 103. Pt has had febrile episodes during admission. Continues IV abx.

## 2019-04-04 NOTE — PROGRESS NOTE ADULT - SUBJECTIVE AND OBJECTIVE BOX
infectious diseases progress note:    Patient is a 68y old  Female who presents with a chief complaint of fevers (2019 07:58)        Fever             Allergies    ASA; dye contrast (Anaphylaxis)  aspirin (Short breath)  divalproex sodium (Other (Unknown))  Haldol (Other (Unknown))  penicillin (Short breath; Rash)  sulfa drugs (Short breath; Rash)  vancomycin (Rash; Urticaria; Hives)  Xanax (Other (Unknown))    Intolerances        ANTIBIOTICS/RELEVANT:  antimicrobials  vancomycin  IVPB 1000 milliGRAM(s) IV Intermittent every 24 hours    immunologic:    OTHER:  acetaminophen    Suspension .. 650 milliGRAM(s) Oral every 6 hours PRN  ALBUTerol    90 MICROgram(s) HFA Inhaler 1 Puff(s) Inhalation every 4 hours  ALBUTerol/ipratropium for Nebulization 3 milliLiter(s) Nebulizer every 6 hours  ascorbic acid 500 milliGRAM(s) Oral daily  buDESOnide 160 MICROgram(s)/formoterol 4.5 MICROgram(s) Inhaler 2 Puff(s) Inhalation two times a day  clonazePAM Tablet 1.5 milliGRAM(s) Oral <User Schedule>  Dakins Solution - 1/4 Strength 1 Application(s) Topical daily PRN  diphenhydrAMINE   Elixir 25 milliGRAM(s) Oral every 6 hours PRN  ergocalciferol 15347 Unit(s) Oral every week  famotidine    Tablet 20 milliGRAM(s) Oral daily  ferrous    sulfate Liquid 300 milliGRAM(s) Enteral Tube daily  gabapentin   Solution 300 milliGRAM(s) Oral two times a day  hydrocortisone sodium succinate Injectable 100 milliGRAM(s) IV Push every 8 hours  lisinopril 5 milliGRAM(s) Enteral Tube at bedtime  metoprolol tartrate 12.5 milliGRAM(s) Enteral Tube two times a day  midodrine 10 milliGRAM(s) Oral every 8 hours  multivitamin 1 Tablet(s) Oral daily  nystatin Powder 1 Application(s) Topical two times a day PRN  petrolatum white Ointment 1 Application(s) Topical three times a day  QUEtiapine 25 milliGRAM(s) Oral at bedtime  sodium chloride 0.65% Nasal 1 Spray(s) Both Nostrils three times a day PRN  sodium chloride 0.9% Bolus 500 milliLiter(s) IV Bolus once  tiotropium 18 MICROgram(s) Capsule 1 Capsule(s) Inhalation daily  tiZANidine 4 milliGRAM(s) Oral <User Schedule>      Objective:  Vital Signs Last 24 Hrs  T(C): 36.7 (2019 05:01), Max: 39.4 (2019 19:47)  T(F): 98 (2019 05:01), Max: 103 (2019 19:47)  HR: 89 (2019 05:01) (89 - 112)  BP: 115/70 (2019 05:01) (115/70 - 163/84)  BP(mean): --  RR: 18 (2019 05:01) (18 - 18)  SpO2: 97% (2019 05:01) (94% - 97%)    PHYSICAL EXAM:     Eyes:JACQUELIN, EOMI  Ear/Nose/Throat: no oral lesion, no sinus tenderness on percussion	  Neck:no JVD, no lymphadenopathy, supple  Respiratory: CTA maryjane  Cardiovascular: S1S2 RRR, no murmurs  Gastrointestinal:soft, (+) BS, no HSM  Extremities:no e/e/c        LABS:                        10.6   21.0  )-----------( 408      ( 2019 06:27 )             33.9     04-    143  |  99  |  39<H>  ----------------------------<  184<H>  4.7   |  29  |  0.31<L>    Ca    8.1<L>      2019 06:48        Urinalysis Basic - ( 2019 14:59 )    Color: Yellow / Appearance: Clear / S.028 / pH: x  Gluc: x / Ketone: Negative  / Bili: Negative / Urobili: Negative   Blood: x / Protein: 30 mg/dL / Nitrite: Negative   Leuk Esterase: Negative / RBC: 3 /hpf / WBC 4 /HPF   Sq Epi: x / Non Sq Epi: 1 / Bacteria: Negative          MICROBIOLOGY:    RECENT CULTURES:   @ 08:18 .Blood Blood-Peripheral                No growth to date.     @ 03:53 .Blood Blood-Peripheral   PCR    Growth in anaerobic bottle: Gram Positive Cocci in Clusters    Blood Culture PCR  Blood Culture PCR     No growth at 5 days.     @ 03:17 .Urine Catheterized                No growth          RESPIRATORY CULTURES:              RADIOLOGY & ADDITIONAL STUDIES:        Pager [phone]  After 5 pm/weekends or if no response :[phone]

## 2019-04-04 NOTE — CHART NOTE - NSCHARTNOTEFT_GEN_A_CORE
Nutrition Follow Up Note  Patient seen for: follow up    · Reason for Admission	fevers    68 yr old with advanced dementia , bed bound, contracted, cva, sp removal of infected hip due to mrsa with placement of space last fall.   Pt has been havening fevers for months.  I suspect there is ongoing Om of the hip site but changing the spacer is not a realistic option . chronic Om of the sacrum usually does not cause fevers and does not need prolonged ab therapy .         As per Md  pt will need long term iv antibiotic. However  pt not medically ready for discharge.   4/3 Rectal [id number]    Source: nurse, chart, daughter     Diet : ENTERAL,NPO/ENTERAL    Patient reports: unable to speak    Spoke with daughter at bedside with regard to timing of supplements provided. pt still with BM's as per daughter. nurse states BM's are normal tube feed BM's.          Enteral /Parenteral Nutrition:   · Enteral Nutrition: Glucerna 1.5 (4 cans daily) provides 1424calories/78grams protein/720g/mlwater) 34kcal/kg and 1.8grams protein/kg. based on IBW of 42kg. provide additional 300ml free water daily.    · Medical and Food Supplements: Danactive x 2 daily. TF pro source  2 packs daily.        Daily Weight in k.4 (-), Weight in k.6 (-), Weight in k.6 (-31), Weight in k (-30), Weight in k.8 (-29)  % Weight Change: 24% gain since 3/27  pt current weight indicated a health BMI of 24.4      Pertinent Medications: MEDICATIONS  (STANDING):  ALBUTerol    90 MICROgram(s) HFA Inhaler 1 Puff(s) Inhalation every 4 hours  ALBUTerol/ipratropium for Nebulization 3 milliLiter(s) Nebulizer every 6 hours  ascorbic acid 500 milliGRAM(s) Oral daily  buDESOnide 160 MICROgram(s)/formoterol 4.5 MICROgram(s) Inhaler 2 Puff(s) Inhalation two times a day  clonazePAM Tablet 1.5 milliGRAM(s) Oral <User Schedule>  ergocalciferol 75611 Unit(s) Oral every week  famotidine    Tablet 20 milliGRAM(s) Oral daily  ferrous    sulfate Liquid 300 milliGRAM(s) Enteral Tube daily  gabapentin   Solution 300 milliGRAM(s) Oral two times a day  hydrocortisone sodium succinate Injectable 100 milliGRAM(s) IV Push every 8 hours  lisinopril 5 milliGRAM(s) Enteral Tube at bedtime  metoprolol tartrate 12.5 milliGRAM(s) Enteral Tube two times a day  midodrine 10 milliGRAM(s) Oral every 8 hours  multivitamin 1 Tablet(s) Oral daily  petrolatum white Ointment 1 Application(s) Topical three times a day  QUEtiapine 25 milliGRAM(s) Oral at bedtime  sodium chloride 0.9% Bolus 500 milliLiter(s) IV Bolus once  tiotropium 18 MICROgram(s) Capsule 1 Capsule(s) Inhalation daily  tiZANidine 4 milliGRAM(s) Oral <User Schedule>  vancomycin  IVPB 1000 milliGRAM(s) IV Intermittent every 24 hours    MEDICATIONS  (PRN):  acetaminophen    Suspension .. 650 milliGRAM(s) Oral every 6 hours PRN Temp greater or equal to 38C (100.4F), Mild Pain (1 - 3), Moderate Pain (4 - 6)  Dakins Solution - 1/4 Strength 1 Application(s) Topical daily PRN if dressing soiled  diphenhydrAMINE   Elixir 25 milliGRAM(s) Oral every 6 hours PRN Rash and/or Itching  nystatin Powder 1 Application(s) Topical two times a day PRN rash/itchiness  sodium chloride 0.65% Nasal 1 Spray(s) Both Nostrils three times a day PRN Nasal Congestion    Pertinent Labs: BUN 39, creatinine 0.31, Glucose 184, PHOS 2.2,   CAPILLARY BLOOD GLUCOSE      POCT Blood Glucose.: 213 mg/dL (2019 18:19)    Hemoglobin A1C, Whole Blood: 5.3 % ( @ 20:13)              Skin per nursing documentation: stage IV coccyx, stage III right hip  Edema: +1 left knee    Estimated Needs:   [x ] no change since previous assessment  [ ] recalculated:     Previous Nutrition Diagnosis:  Increased nutrient needs (specify); calories/protein  Nutrition Diagnosis is: ongoing-being addressed with supplements         Interventions: discussed supplements and preferred times that  requests pt be given them via daughter/phone    Recommend  1)provider to RN; spoke with NP, TF pro source at 8am and 16pm. Danactive at 12pm and at 20pm.  2)continue VitC  3)continue Glucerna 1.5 (4 cans daily) provides 1424calories/78grams protein/720g/mlwater) 34kcal/kg and 1.8grams protein/kg. based on IBW of 42kg. provide additional 300ml free water daily.  4)continue multivitamin    Monitoring and Evaluation:     Continue to monitor Nutritional intake, Tolerance to diet prescription, weights, labs, skin integrity    RD remains available upon request and will follow up per protocol  Anne Marie Morgan MA, RD, CDN #[phone]

## 2019-04-04 NOTE — CHART NOTE - NSCHARTNOTEFT_GEN_A_CORE
Pt off AC  for PICC line   d/w IV team unable to place PICC today   after d/w DR Mark placed on Heparin drip   please call IV team in AM at 1035 for timing to stop Heparin   Will d/w Mr Colon about  Heparin drip   Lazarus Pierre NP-C [id number] Pt off AC  for PICC line   d/w IV team unable to place PICC today   after d/w DR Mark placed on Heparin drip   please call IV team in AM at 1035 for timing to stop Heparin   Will d/w Mr Colon about  Heparin drip   Lazarus Pierre NP-C [id number]     Np note  at 1900 : Pt was give 2 ml of Medical Marijuana by daughter Tatyana  Via peg at ~1800. Documentations  and verifications in Paper chart .     Lazarus Pierre NP-C [id number] Pt off AC  for PICC line   d/w IV team unable to place PICC today   after d/w DR Mark placed on Heparin drip   please call IV team in AM at 1035 for timing to stop Heparin   Will d/w Mr Colon about  Heparin drip   Lazarus Pierre NP-C [id number]     Np note  at 1900 : Pt was given 2 ml of Medical Marijuana oil  by daughter Tatyana  Via peg at ~1800. Documentations  and verifications in Paper chart .     Lazarus Pierre NP-C [id number]

## 2019-04-04 NOTE — PROGRESS NOTE ADULT - ASSESSMENT
68 yr old with advanced dementia , bed bound, contracted, cva, sp removal of infected hip due to mrsa with placement of space last fall.   Pt has been havening fevers for months.        Sacral stage 4 pressure injury- Aquacel   Rt Ischium partial thickness wound- CAVILON comfeel  LLE wound - AQuacel  BLE elevation  Abx per Medicine/ ID  Moisturize intact skin w/ SWEEN cream BID  con't Nutrition (as tolerated), Nutrition Consult  con't Offloading   con't Pericare  Care as per medicine will follow w/ you  Upon discharge f/[facility] as [facility] [address] [facility] [phone]  Seen w/ attng and D/w team  Mona Quick PA-C CWS [id number]  I spent 25  minutes w/ this pt of which more than 50% of the time was spent counseling & coordinating care of this pt.

## 2019-04-04 NOTE — CHART NOTE - NSCHARTNOTEFT_GEN_A_CORE
MEDICINE PA  Patient is a 68y old  Female who presents with a chief complaint of fevers (03 Apr 2019 07:58)      Event Summary:   Notified by RN patient with fever - temperature of 103.  Patient seen and examined at the bedside.  Pt appears non-toxic, NAD.    VITAL SIGNS:  T(C): 37 (04-03-19 @ 23:32), Max: 39.4 (04-03-19 @ 19:47)  T(F): 98.6 (04-03-19 @ 23:32), Max: 103 (04-03-19 @ 19:47)  HR: 108 (04-03-19 @ 20:15) (92 - 112)  BP: 163/84 (04-03-19 @ 20:15) (119/69 - 163/84)  RR: 18 (04-03-19 @ 20:15) (18 - 20)  SpO2: 95% (04-03-19 @ 20:15) (94% - 95%)        MEDICATIONS:    MEDICATIONS  (STANDING):  ALBUTerol    90 MICROgram(s) HFA Inhaler 1 Puff(s) Inhalation every 4 hours  ALBUTerol/ipratropium for Nebulization 3 milliLiter(s) Nebulizer every 6 hours  ascorbic acid 500 milliGRAM(s) Oral daily  buDESOnide 160 MICROgram(s)/formoterol 4.5 MICROgram(s) Inhaler 2 Puff(s) Inhalation two times a day  clonazePAM Tablet 1.5 milliGRAM(s) Oral <User Schedule>  ergocalciferol 38472 Unit(s) Oral every week  famotidine    Tablet 20 milliGRAM(s) Oral daily  ferrous    sulfate Liquid 300 milliGRAM(s) Enteral Tube daily  gabapentin   Solution 300 milliGRAM(s) Oral two times a day  hydrocortisone sodium succinate Injectable 100 milliGRAM(s) IV Push every 8 hours  lisinopril 5 milliGRAM(s) Enteral Tube at bedtime  metoprolol tartrate 12.5 milliGRAM(s) Enteral Tube two times a day  midodrine 10 milliGRAM(s) Oral every 8 hours  multivitamin 1 Tablet(s) Oral daily  petrolatum white Ointment 1 Application(s) Topical three times a day  QUEtiapine 25 milliGRAM(s) Oral at bedtime  sodium chloride 0.9% Bolus 500 milliLiter(s) IV Bolus once  tiotropium 18 MICROgram(s) Capsule 1 Capsule(s) Inhalation daily  tiZANidine 4 milliGRAM(s) Oral <User Schedule>  vancomycin  IVPB 1000 milliGRAM(s) IV Intermittent every 24 hours      LABORATORY:                          10.1   18.5  )-----------( 442      ( 03 Apr 2019 06:48 )             32.2       04-03    143  |  99  |  39<H>  ----------------------------<  184<H>  4.7   |  29  |  0.31<L>    Ca    8.1<L>      03 Apr 2019 06:48          MICROBIOLOGY:     Urine Culture:   Blood Culture:    RADIOLOGY:  CXR: 3/31      PHYSICAL EXAM:    >ASSESSMENT/PLAN:   HPI:  This patient is a 68yoF with PMH of advanced dementia (nonverbal, dysphagia with PEG tube, functional quadriplegic, chronic gavin catheter), sacral stage 4 pressure ulcer, heel pressure ulcers, asthma on chronic prednisone., hLd, CVA, UC (in remission for 30years), 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 History was provided by patient's daughters, Tere and Angie at bedside. Patient has been having persistent fever at home up to 103F. She has appeared more lethargic for the last 2 days and has had labored breathing, but no cough. She has loose nonbloody stools. No recent emesis, melena or hematuria. Urine output via gavin catheter is normal yellow appearance and had "good output." Patient gets regular wound care but has persistent ulcers. Because of persistent fevers, pt was brought to ED.     This patient was previously hospitalized from 2/26-3/18/2019 for fevers, found to have septic shock 2/2 UTI vs sacral ulcer vs PNA, requiring pressor support and MICU stay. 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 Offer was made to change the spacer and get cultures, however at that time, the  did not want to put the patient through surgery. Pt completed a course of meropenem, and was resumed on suppressive minocycline.     In the ED, T 100.8 Rectal  , /63, RR 14, SpO2 98%RA  MEETS SIRS CRITERIA based on HR and temperature.   She was given IVNS 1.5L, acetaminophen 650mg and meropenem 1g. (24 Mar 2019 20:54)      1) Fever  - Tylenol / Cooling measures prn for Pyrexia  -cooling blanket   - BC x2, Ucx- ordered 4/3/19 by AM team. Pending results  - CXR  - c/w Current Abx/ Antifungal/ IVF regimen   -c/w Monitoring closely   -ID on board, f/u with team  - F/U Primary  care team in AM MEDICINE PA  Patient is a 68y old  Female who presents with a chief complaint of fevers (03 Apr 2019 07:58)      Event Summary:   Notified by RN patient with fever - temperature of 103 at 19:47.  Patient seen and examined at the bedside.  Pt appears non-toxic, NAD.    VITAL SIGNS:  T(C): 37 (04-03-19 @ 23:32), Max: 39.4 (04-03-19 @ 19:47)  T(F): 98.6 (04-03-19 @ 23:32), Max: 103 (04-03-19 @ 19:47)  HR: 108 (04-03-19 @ 20:15) (92 - 112)  BP: 163/84 (04-03-19 @ 20:15) (119/69 - 163/84)  RR: 18 (04-03-19 @ 20:15) (18 - 20)  SpO2: 95% (04-03-19 @ 20:15) (94% - 95%)        MEDICATIONS:    MEDICATIONS  (STANDING):  ALBUTerol    90 MICROgram(s) HFA Inhaler 1 Puff(s) Inhalation every 4 hours  ALBUTerol/ipratropium for Nebulization 3 milliLiter(s) Nebulizer every 6 hours  ascorbic acid 500 milliGRAM(s) Oral daily  buDESOnide 160 MICROgram(s)/formoterol 4.5 MICROgram(s) Inhaler 2 Puff(s) Inhalation two times a day  clonazePAM Tablet 1.5 milliGRAM(s) Oral <User Schedule>  ergocalciferol 38222 Unit(s) Oral every week  famotidine    Tablet 20 milliGRAM(s) Oral daily  ferrous    sulfate Liquid 300 milliGRAM(s) Enteral Tube daily  gabapentin   Solution 300 milliGRAM(s) Oral two times a day  hydrocortisone sodium succinate Injectable 100 milliGRAM(s) IV Push every 8 hours  lisinopril 5 milliGRAM(s) Enteral Tube at bedtime  metoprolol tartrate 12.5 milliGRAM(s) Enteral Tube two times a day  midodrine 10 milliGRAM(s) Oral every 8 hours  multivitamin 1 Tablet(s) Oral daily  petrolatum white Ointment 1 Application(s) Topical three times a day  QUEtiapine 25 milliGRAM(s) Oral at bedtime  sodium chloride 0.9% Bolus 500 milliLiter(s) IV Bolus once  tiotropium 18 MICROgram(s) Capsule 1 Capsule(s) Inhalation daily  tiZANidine 4 milliGRAM(s) Oral <User Schedule>  vancomycin  IVPB 1000 milliGRAM(s) IV Intermittent every 24 hours      LABORATORY:                          10.1   18.5  )-----------( 442      ( 03 Apr 2019 06:48 )             32.2       04-03    143  |  99  |  39<H>  ----------------------------<  184<H>  4.7   |  29  |  0.31<L>    Ca    8.1<L>      03 Apr 2019 06:48          MICROBIOLOGY:     Urine Culture: 3/29- neg (final)  Blood Culture: x2 3/31- neg (prelim)    RADIOLOGY:  CXR: 3/31      PHYSICAL EXAM:    >ASSESSMENT/PLAN:   HPI:  68yoF with PMH of advanced dementia (nonverbal, dysphagia with PEG tube, functional quadriplegic, chronic gavin catheter), sacral stage 4 pressure ulcer, heel pressure ulcers, asthma on chronic prednisone., hLd, CVA, UC (in remission for 30years), right prosthetic Hip MRSA infection in 7/2018 s/p spacer placement, HFrEF, elevated pulmonary pressure Presents to the hospital for fevers.     1) Fever  - Tylenol / Cooling measures prn for Pyrexia  -cooling blanket   - BC x2, Ucx- ordered 4/3/19 by AM team. Pending results  - CXR  - c/w Current Abx  -c/w Monitoring closely   -ID on board, f/u with team  - F/U Primary  care team in AM      Stephanie Hu PA-C  Dept of Medicine  #[id number]

## 2019-04-04 NOTE — PROGRESS NOTE ADULT - ATTENDING COMMENTS
Status d/w  at bedside  He reports that new femur fracture is related to life long use of steroids used to treat underlying asthma with resultant effect on bones  Sacral wound continues to granulate   Will D/C Dakin and start Aquacel   Remains incontinent of stool  Eyes open but remains non verbal

## 2019-04-05 LAB
ANION GAP SERPL CALC-SCNC: 15 MMOL/L — SIGNIFICANT CHANGE UP (ref 5–17)
APTT BLD: 36 SEC — SIGNIFICANT CHANGE UP (ref 27.5–36.3)
APTT BLD: 54.8 SEC — HIGH (ref 27.5–36.3)
APTT BLD: 80.3 SEC — HIGH (ref 27.5–36.3)
BASOPHILS # BLD AUTO: 0 K/UL — SIGNIFICANT CHANGE UP (ref 0–0.2)
BASOPHILS NFR BLD AUTO: 0 % — SIGNIFICANT CHANGE UP (ref 0–2)
BUN SERPL-MCNC: 37 MG/DL — HIGH (ref 7–23)
CALCIUM SERPL-MCNC: 8.2 MG/DL — LOW (ref 8.4–10.5)
CHLORIDE SERPL-SCNC: 96 MMOL/L — SIGNIFICANT CHANGE UP (ref 96–108)
CO2 SERPL-SCNC: 30 MMOL/L — SIGNIFICANT CHANGE UP (ref 22–31)
CREAT SERPL-MCNC: <0.3 MG/DL — LOW (ref 0.5–1.3)
CULTURE RESULTS: SIGNIFICANT CHANGE UP
CULTURE RESULTS: SIGNIFICANT CHANGE UP
EOSINOPHIL # BLD AUTO: 0 K/UL — SIGNIFICANT CHANGE UP (ref 0–0.5)
EOSINOPHIL NFR BLD AUTO: 0.2 % — SIGNIFICANT CHANGE UP (ref 0–6)
GLUCOSE SERPL-MCNC: 231 MG/DL — HIGH (ref 70–99)
HCT VFR BLD CALC: 32.2 % — LOW (ref 34.5–45)
HCT VFR BLD CALC: 34.6 % — SIGNIFICANT CHANGE UP (ref 34.5–45)
HGB BLD-MCNC: 10.5 G/DL — LOW (ref 11.5–15.5)
HGB BLD-MCNC: 11 G/DL — LOW (ref 11.5–15.5)
LYMPHOCYTES # BLD AUTO: 1.1 K/UL — SIGNIFICANT CHANGE UP (ref 1–3.3)
LYMPHOCYTES # BLD AUTO: 5.8 % — LOW (ref 13–44)
MCHC RBC-ENTMCNC: 29.6 PG — SIGNIFICANT CHANGE UP (ref 27–34)
MCHC RBC-ENTMCNC: 30.1 PG — SIGNIFICANT CHANGE UP (ref 27–34)
MCHC RBC-ENTMCNC: 31.9 GM/DL — LOW (ref 32–36)
MCHC RBC-ENTMCNC: 32.7 GM/DL — SIGNIFICANT CHANGE UP (ref 32–36)
MCV RBC AUTO: 92.1 FL — SIGNIFICANT CHANGE UP (ref 80–100)
MCV RBC AUTO: 92.6 FL — SIGNIFICANT CHANGE UP (ref 80–100)
MONOCYTES # BLD AUTO: 0.4 K/UL — SIGNIFICANT CHANGE UP (ref 0–0.9)
MONOCYTES NFR BLD AUTO: 2.2 % — SIGNIFICANT CHANGE UP (ref 2–14)
NEUTROPHILS # BLD AUTO: 18 K/UL — HIGH (ref 1.8–7.4)
NEUTROPHILS NFR BLD AUTO: 91.8 % — HIGH (ref 43–77)
PLATELET # BLD AUTO: 431 K/UL — HIGH (ref 150–400)
PLATELET # BLD AUTO: 486 K/UL — HIGH (ref 150–400)
POTASSIUM SERPL-MCNC: 2.9 MMOL/L — CRITICAL LOW (ref 3.5–5.3)
POTASSIUM SERPL-MCNC: 2.9 MMOL/L — CRITICAL LOW (ref 3.5–5.3)
POTASSIUM SERPL-SCNC: 2.9 MMOL/L — CRITICAL LOW (ref 3.5–5.3)
POTASSIUM SERPL-SCNC: 2.9 MMOL/L — CRITICAL LOW (ref 3.5–5.3)
RBC # BLD: 3.49 M/UL — LOW (ref 3.8–5.2)
RBC # BLD: 3.73 M/UL — LOW (ref 3.8–5.2)
RBC # FLD: 17.1 % — HIGH (ref 10.3–14.5)
RBC # FLD: 17.2 % — HIGH (ref 10.3–14.5)
SODIUM SERPL-SCNC: 141 MMOL/L — SIGNIFICANT CHANGE UP (ref 135–145)
SPECIMEN SOURCE: SIGNIFICANT CHANGE UP
SPECIMEN SOURCE: SIGNIFICANT CHANGE UP
WBC # BLD: 19.6 K/UL — HIGH (ref 3.8–10.5)
WBC # BLD: 23.3 K/UL — HIGH (ref 3.8–10.5)
WBC # FLD AUTO: 19.6 K/UL — HIGH (ref 3.8–10.5)
WBC # FLD AUTO: 23.3 K/UL — HIGH (ref 3.8–10.5)

## 2019-04-05 PROCEDURE — 71045 X-RAY EXAM CHEST 1 VIEW: CPT | Mod: 26

## 2019-04-05 PROCEDURE — 99232 SBSQ HOSP IP/OBS MODERATE 35: CPT

## 2019-04-05 RX ORDER — HYDROMORPHONE HYDROCHLORIDE 2 MG/ML
0.5 INJECTION INTRAMUSCULAR; INTRAVENOUS; SUBCUTANEOUS ONCE
Qty: 0 | Refills: 0 | Status: DISCONTINUED | OUTPATIENT
Start: 2019-04-05 | End: 2019-04-05

## 2019-04-05 RX ORDER — APIXABAN 2.5 MG/1
5 TABLET, FILM COATED ORAL EVERY 12 HOURS
Qty: 0 | Refills: 0 | Status: DISCONTINUED | OUTPATIENT
Start: 2019-04-05 | End: 2019-05-06

## 2019-04-05 RX ORDER — APIXABAN 2.5 MG/1
5 TABLET, FILM COATED ORAL EVERY 12 HOURS
Qty: 0 | Refills: 0 | Status: DISCONTINUED | OUTPATIENT
Start: 2019-04-05 | End: 2019-04-05

## 2019-04-05 RX ORDER — POTASSIUM CHLORIDE 20 MEQ
40 PACKET (EA) ORAL EVERY 4 HOURS
Qty: 0 | Refills: 0 | Status: COMPLETED | OUTPATIENT
Start: 2019-04-05 | End: 2019-04-05

## 2019-04-05 RX ORDER — POTASSIUM CHLORIDE 20 MEQ
40 PACKET (EA) ORAL ONCE
Qty: 0 | Refills: 0 | Status: DISCONTINUED | OUTPATIENT
Start: 2019-04-05 | End: 2019-04-05

## 2019-04-05 RX ORDER — OXYCODONE HYDROCHLORIDE 5 MG/1
5 TABLET ORAL EVERY 6 HOURS
Qty: 0 | Refills: 0 | Status: DISCONTINUED | OUTPATIENT
Start: 2019-04-05 | End: 2019-04-11

## 2019-04-05 RX ADMIN — OXYCODONE HYDROCHLORIDE 5 MILLIGRAM(S): 5 TABLET ORAL at 19:46

## 2019-04-05 RX ADMIN — Medication 12.5 MILLIGRAM(S): at 09:26

## 2019-04-05 RX ADMIN — Medication 3 MILLILITER(S): at 23:17

## 2019-04-05 RX ADMIN — HEPARIN SODIUM 900 UNIT(S)/HR: 5000 INJECTION INTRAVENOUS; SUBCUTANEOUS at 07:47

## 2019-04-05 RX ADMIN — GABAPENTIN 300 MILLIGRAM(S): 400 CAPSULE ORAL at 11:26

## 2019-04-05 RX ADMIN — TIZANIDINE 4 MILLIGRAM(S): 4 TABLET ORAL at 22:22

## 2019-04-05 RX ADMIN — Medication 3 MILLILITER(S): at 12:42

## 2019-04-05 RX ADMIN — Medication 1 APPLICATION(S): at 07:46

## 2019-04-05 RX ADMIN — BUDESONIDE AND FORMOTEROL FUMARATE DIHYDRATE 2 PUFF(S): 160; 4.5 AEROSOL RESPIRATORY (INHALATION) at 22:22

## 2019-04-05 RX ADMIN — HYDROMORPHONE HYDROCHLORIDE 0.5 MILLIGRAM(S): 2 INJECTION INTRAMUSCULAR; INTRAVENOUS; SUBCUTANEOUS at 14:11

## 2019-04-05 RX ADMIN — HEPARIN SODIUM 900 UNIT(S)/HR: 5000 INJECTION INTRAVENOUS; SUBCUTANEOUS at 01:19

## 2019-04-05 RX ADMIN — GABAPENTIN 300 MILLIGRAM(S): 400 CAPSULE ORAL at 20:38

## 2019-04-05 RX ADMIN — Medication 3 MILLILITER(S): at 17:37

## 2019-04-05 RX ADMIN — QUETIAPINE FUMARATE 25 MILLIGRAM(S): 200 TABLET, FILM COATED ORAL at 22:22

## 2019-04-05 RX ADMIN — Medication 40 MILLIEQUIVALENT(S): at 13:59

## 2019-04-05 RX ADMIN — TIZANIDINE 4 MILLIGRAM(S): 4 TABLET ORAL at 11:23

## 2019-04-05 RX ADMIN — FAMOTIDINE 20 MILLIGRAM(S): 10 INJECTION INTRAVENOUS at 09:24

## 2019-04-05 RX ADMIN — Medication 25 MILLIGRAM(S): at 09:30

## 2019-04-05 RX ADMIN — OXYCODONE HYDROCHLORIDE 5 MILLIGRAM(S): 5 TABLET ORAL at 20:20

## 2019-04-05 RX ADMIN — Medication 1 APPLICATION(S): at 22:35

## 2019-04-05 RX ADMIN — Medication 500 MILLIGRAM(S): at 09:22

## 2019-04-05 RX ADMIN — Medication 250 MILLIGRAM(S): at 16:02

## 2019-04-05 RX ADMIN — Medication 40 MILLIEQUIVALENT(S): at 17:37

## 2019-04-05 RX ADMIN — Medication 100 MILLIGRAM(S): at 07:10

## 2019-04-05 RX ADMIN — HYDROMORPHONE HYDROCHLORIDE 0.5 MILLIGRAM(S): 2 INJECTION INTRAMUSCULAR; INTRAVENOUS; SUBCUTANEOUS at 15:00

## 2019-04-05 RX ADMIN — Medication 40 MILLIEQUIVALENT(S): at 09:48

## 2019-04-05 RX ADMIN — Medication 100 MILLIGRAM(S): at 21:26

## 2019-04-05 RX ADMIN — APIXABAN 5 MILLIGRAM(S): 2.5 TABLET, FILM COATED ORAL at 19:02

## 2019-04-05 RX ADMIN — LISINOPRIL 5 MILLIGRAM(S): 2.5 TABLET ORAL at 21:26

## 2019-04-05 RX ADMIN — Medication 300 MILLIGRAM(S): at 09:25

## 2019-04-05 RX ADMIN — Medication 3 MILLILITER(S): at 07:10

## 2019-04-05 RX ADMIN — Medication 12.5 MILLIGRAM(S): at 20:38

## 2019-04-05 RX ADMIN — MIDODRINE HYDROCHLORIDE 10 MILLIGRAM(S): 2.5 TABLET ORAL at 13:59

## 2019-04-05 RX ADMIN — HEPARIN SODIUM 3000 UNIT(S): 5000 INJECTION INTRAVENOUS; SUBCUTANEOUS at 01:21

## 2019-04-05 RX ADMIN — MIDODRINE HYDROCHLORIDE 10 MILLIGRAM(S): 2.5 TABLET ORAL at 07:10

## 2019-04-05 RX ADMIN — Medication 25 MILLIGRAM(S): at 14:24

## 2019-04-05 RX ADMIN — Medication 40 MILLIEQUIVALENT(S): at 10:26

## 2019-04-05 RX ADMIN — Medication 100 MILLIGRAM(S): at 13:58

## 2019-04-05 RX ADMIN — Medication 1.5 MILLIGRAM(S): at 22:22

## 2019-04-05 RX ADMIN — Medication 1 TABLET(S): at 09:27

## 2019-04-05 RX ADMIN — Medication 1 APPLICATION(S): at 13:59

## 2019-04-05 RX ADMIN — BUDESONIDE AND FORMOTEROL FUMARATE DIHYDRATE 2 PUFF(S): 160; 4.5 AEROSOL RESPIRATORY (INHALATION) at 09:30

## 2019-04-05 NOTE — PROGRESS NOTE ADULT - ASSESSMENT
68 yr old with advanced dementia , bed bound, contracted, cva, sp removal of infected hip due to mrsa with placement of space last fall.   Pt has been havening fevers for months.  I suspect there is ongoing Om of the hip site but changing the spacer is not a realistic option . chronic Om of the sacrum usually does not cause fevers and does not need prolonged ab therapy .       cultures negative to date  uc with yeast of doubtful significance    vanco to cover mrsa  can dc meropenem  after cultures , we cac decide on endpoint of ab therapy   discussed  with      reluctant to rescan   does not want mri  temp is down on vanco    if it stays down , we will need to place picc and give her a course  of vanco even though it will  not cure a chronic hip infection         no good options  discussed with   pathologic fracture noted due to contractures and osteoporosis    will place picc line and plan several weeks of vanco realizing she may still have fever.   Discussed with  in detail  .

## 2019-04-05 NOTE — PROGRESS NOTE ADULT - SUBJECTIVE AND OBJECTIVE BOX
---___---___---___---___---___---___ ---___---___---___---___---___---___---___---___---___---                    <<<  M E D I C A L   A T T E N D I N G    F O L L O W    U P   N O T E  >>>    patient stable no distress      ---___---___---___---___---___---      <<<  MEDICATIONS:  >>>    MEDICATIONS  (STANDING):  ALBUTerol    90 MICROgram(s) HFA Inhaler 1 Puff(s) Inhalation every 4 hours  ALBUTerol/ipratropium for Nebulization 3 milliLiter(s) Nebulizer every 6 hours  ascorbic acid 500 milliGRAM(s) Oral daily  buDESOnide 160 MICROgram(s)/formoterol 4.5 MICROgram(s) Inhaler 2 Puff(s) Inhalation two times a day  clonazePAM Tablet 1.5 milliGRAM(s) Oral <User Schedule>  ergocalciferol 42612 Unit(s) Oral every week  famotidine    Tablet 20 milliGRAM(s) Oral daily  ferrous    sulfate Liquid 300 milliGRAM(s) Enteral Tube daily  gabapentin   Solution 300 milliGRAM(s) Oral two times a day  heparin  Infusion.  Unit(s)/Hr (7 mL/Hr) IV Continuous <Continuous>  hydrocortisone sodium succinate Injectable 100 milliGRAM(s) IV Push every 8 hours  lisinopril 5 milliGRAM(s) Enteral Tube at bedtime  metoprolol tartrate 12.5 milliGRAM(s) Enteral Tube two times a day  midodrine 10 milliGRAM(s) Oral every 8 hours  multivitamin 1 Tablet(s) Oral daily  petrolatum white Ointment 1 Application(s) Topical three times a day  QUEtiapine 25 milliGRAM(s) Oral at bedtime  sodium chloride 0.9% Bolus 500 milliLiter(s) IV Bolus once  tiotropium 18 MICROgram(s) Capsule 1 Capsule(s) Inhalation daily  tiZANidine 4 milliGRAM(s) Oral <User Schedule>  vancomycin  IVPB 1000 milliGRAM(s) IV Intermittent every 24 hours      MEDICATIONS  (PRN):  acetaminophen    Suspension .. 650 milliGRAM(s) Oral every 6 hours PRN Temp greater or equal to 38C (100.4F), Mild Pain (1 - 3), Moderate Pain (4 - 6)  diphenhydrAMINE   Elixir 25 milliGRAM(s) Oral every 6 hours PRN Rash and/or Itching  heparin  Injectable 3000 Unit(s) IV Push every 6 hours PRN For aPTT less than 40  heparin  Injectable 1500 Unit(s) IV Push every 6 hours PRN For aPTT between 40 - 57  nystatin Powder 1 Application(s) Topical two times a day PRN rash/itchiness  sodium chloride 0.65% Nasal 1 Spray(s) Both Nostrils three times a day PRN Nasal Congestion       ---___---___---___---___---___---     <<<REVIEW OF SYSTEM: >>>    unable to obtain      ---___---___---___---___---___---          <<<  VITAL SIGNS: >>>    T(F): 98.1 (19 @ 04:59), Max: 99.6 (19 @ 23:45)  HR: 94 (19 @ 04:59) (87 - 109)  BP: 111/67 (19 @ 04:59) (111/67 - 146/83)  RR: 19 (19 @ 04:59) (18 - 19)  SpO2: 100% (19 @ 04:59) (95% - 100%)  Wt(kg): --  CAPILLARY BLOOD GLUCOSE      POCT Blood Glucose.: 213 mg/dL (2019 18:19)    I&O's Summary    2019 07:01  -  2019 07:00  --------------------------------------------------------  IN: 164 mL / OUT: 850 mL / NET: -686 mL         ---___---___---___---___---___---                       PHYSICAL EXAM:    GEN: A&O X 0 , NAD , comfortable  HEENT: NCAT, PERRL, MMM, no scleral icterus, hearing intact  NECK: Supple, No JVD  CVS: S1S2 , regular , No M/R/G appreciated  PULM: CTA B/L,  no W/R/R appreciated  ABD.: soft. non tender, non distended,  bowel sounds present  Extrem: intact pulses , no edema noted left leg inn brace   Derm: No rash or ecchymosis noted  PSYCH: normal mood, no depression, not anxious     ---___---___---___---___---___---     <<<  LAB AND IMAGING: >>>                          11.0   23.3  )-----------( 486      ( 2019 23:57 )             34.6               04-05    141  |  96  |  37<H>  ----------------------------<  231<H>  2.9<LL>   |  30  |  <0.30<L>    Ca    8.2<L>      2019 07:19      PTT - ( 2019 07:19 )  PTT:80.3 sec       Urinalysis Basic - ( 2019 14:59 )    Color: Yellow / Appearance: Clear / S.028 / pH: x  Gluc: x / Ketone: Negative  / Bili: Negative / Urobili: Negative   Blood: x / Protein: 30 mg/dL / Nitrite: Negative   Leuk Esterase: Negative / RBC: 3 /hpf / WBC 4 /HPF   Sq Epi: x / Non Sq Epi: 1 / Bacteria: Negative                        [All pertinent / recent available Imaging reports and other labs reviewed]     ---___---___---___---___---___---___ ---___---___---___---___---           <<<  A S S E S S M E N T   A N D   P L A N :  >>>  chronic mrsa infection   new onset left femoral fracture   asthma   dementia   agitation   chronic pain       due for picc line . off eliquis on heparin drip . to stop heparin prior to procedure   contiue vanco  continue seroquel and klonopin   medical marijuan and neurontin for pain   budesonide and tiotropium for copd         -GI/DVT Prophylaxis.    --------------------------------------------  Case discussed with   Education given on     >>______________________<<      Deniz Bolden .         phone   [phone]

## 2019-04-05 NOTE — CHART NOTE - NSCHARTNOTEFT_GEN_A_CORE
Spoke with Dr. Suazo of Orthopedics - no objection to resuming oral AC in lieu of heparin gtt.    Patient now s/p bedside PICC line placement by PICC Line Placement Team.  CXR confirms placement per Radiology.  Will stop heparin gtt and resume Eliquis at this point.  d/w Dr. Bolden.    Sherin Escobedo NP  [phone]

## 2019-04-05 NOTE — CHART NOTE - NSCHARTNOTEFT_GEN_A_CORE
Fit and apply left KAFO fracture orthosis locked in extension. Left leg internally rotated and flexed at knee. Leg extended and derotated as much as possible. Fracture sock applied and replacement given to family to maintain skin integrity and hygiene. Replacement tibial and femoral liners also left bedside. Reviewed application skin precautions and care with nursing and family. Contact information given. To notify office with any issues questions or concerns.  Kulwant BOB  [facility]  [phone]

## 2019-04-05 NOTE — PROGRESS NOTE ADULT - SUBJECTIVE AND OBJECTIVE BOX
infectious diseases progress note:    Patient is a 68y old  Female who presents with a chief complaint of fevers (2019 15:02)        Fever           Allergies    ASA; dye contrast (Anaphylaxis)  aspirin (Short breath)  divalproex sodium (Other (Unknown))  Haldol (Other (Unknown))  penicillin (Short breath; Rash)  sulfa drugs (Short breath; Rash)  vancomycin (Rash; Urticaria; Hives)  Xanax (Other (Unknown))    Intolerances        ANTIBIOTICS/RELEVANT:  antimicrobials  vancomycin  IVPB 1000 milliGRAM(s) IV Intermittent every 24 hours    immunologic:    OTHER:  acetaminophen    Suspension .. 650 milliGRAM(s) Oral every 6 hours PRN  ALBUTerol    90 MICROgram(s) HFA Inhaler 1 Puff(s) Inhalation every 4 hours  ALBUTerol/ipratropium for Nebulization 3 milliLiter(s) Nebulizer every 6 hours  ascorbic acid 500 milliGRAM(s) Oral daily  buDESOnide 160 MICROgram(s)/formoterol 4.5 MICROgram(s) Inhaler 2 Puff(s) Inhalation two times a day  clonazePAM Tablet 1.5 milliGRAM(s) Oral <User Schedule>  diphenhydrAMINE   Elixir 25 milliGRAM(s) Oral every 6 hours PRN  ergocalciferol 79702 Unit(s) Oral every week  famotidine    Tablet 20 milliGRAM(s) Oral daily  ferrous    sulfate Liquid 300 milliGRAM(s) Enteral Tube daily  gabapentin   Solution 300 milliGRAM(s) Oral two times a day  heparin  Infusion.  Unit(s)/Hr IV Continuous <Continuous>  heparin  Injectable 3000 Unit(s) IV Push every 6 hours PRN  heparin  Injectable 1500 Unit(s) IV Push every 6 hours PRN  hydrocortisone sodium succinate Injectable 100 milliGRAM(s) IV Push every 8 hours  lisinopril 5 milliGRAM(s) Enteral Tube at bedtime  metoprolol tartrate 12.5 milliGRAM(s) Enteral Tube two times a day  midodrine 10 milliGRAM(s) Oral every 8 hours  multivitamin 1 Tablet(s) Oral daily  nystatin Powder 1 Application(s) Topical two times a day PRN  petrolatum white Ointment 1 Application(s) Topical three times a day  QUEtiapine 25 milliGRAM(s) Oral at bedtime  sodium chloride 0.65% Nasal 1 Spray(s) Both Nostrils three times a day PRN  sodium chloride 0.9% Bolus 500 milliLiter(s) IV Bolus once  tiotropium 18 MICROgram(s) Capsule 1 Capsule(s) Inhalation daily  tiZANidine 4 milliGRAM(s) Oral <User Schedule>      Objective:  Vital Signs Last 24 Hrs  T(C): 36.7 (2019 04:59), Max: 37.6 (2019 23:45)  T(F): 98.1 (2019 04:59), Max: 99.6 (2019 23:45)  HR: 94 (2019 04:59) (87 - 109)  BP: 111/67 (2019 04:59) (111/67 - 146/83)  BP(mean): --  RR: 19 (2019 04:59) (18 - 19)  SpO2: 100% (2019 04:59) (95% - 100%)    PHYSICAL EXAM:     Eyes:JACQUELIN, EOMI  Ear/Nose/Throat: no oral lesion, no sinus tenderness on percussion	  Neck:no JVD, no lymphadenopathy, supple  Respiratory: CTA maryjane  Cardiovascular: S1S2 RRR, no murmurs  Gastrointestinal:soft, (+) BS, no HSM  Extremities:no e/e/c        LABS:                        11.0   23.3  )-----------( 486      ( 2019 23:57 )             34.6         141  |  96  |  37<H>  ----------------------------<  231<H>  2.9<LL>   |  30  |  <0.30<L>    Ca    8.2<L>      2019 07:19      PTT - ( 2019 07:19 )  PTT:80.3 sec  Urinalysis Basic - ( 2019 14:59 )    Color: Yellow / Appearance: Clear / S.028 / pH: x  Gluc: x / Ketone: Negative  / Bili: Negative / Urobili: Negative   Blood: x / Protein: 30 mg/dL / Nitrite: Negative   Leuk Esterase: Negative / RBC: 3 /hpf / WBC 4 /HPF   Sq Epi: x / Non Sq Epi: 1 / Bacteria: Negative          MICROBIOLOGY:    RECENT CULTURES:   @ 18:29 .Urine Catheterized                No growth     @ 16:38 .Blood Blood-Peripheral                No growth to date.     @ 08:18 .Blood Blood-Peripheral                No growth to date.          RESPIRATORY CULTURES:              RADIOLOGY & ADDITIONAL STUDIES:        Pager [phone]  After 5 pm/weekends or if no response :[phone]

## 2019-04-06 LAB
ANION GAP SERPL CALC-SCNC: 11 MMOL/L — SIGNIFICANT CHANGE UP (ref 5–17)
BUN SERPL-MCNC: 34 MG/DL — HIGH (ref 7–23)
CALCIUM SERPL-MCNC: 8.1 MG/DL — LOW (ref 8.4–10.5)
CHLORIDE SERPL-SCNC: 97 MMOL/L — SIGNIFICANT CHANGE UP (ref 96–108)
CO2 SERPL-SCNC: 30 MMOL/L — SIGNIFICANT CHANGE UP (ref 22–31)
CREAT SERPL-MCNC: <0.3 MG/DL — LOW (ref 0.5–1.3)
GLUCOSE BLDC GLUCOMTR-MCNC: 249 MG/DL — HIGH (ref 70–99)
GLUCOSE SERPL-MCNC: 214 MG/DL — HIGH (ref 70–99)
HCT VFR BLD CALC: 28 % — LOW (ref 34.5–45)
HGB BLD-MCNC: 9.2 G/DL — LOW (ref 11.5–15.5)
MAGNESIUM SERPL-MCNC: 2 MG/DL — SIGNIFICANT CHANGE UP (ref 1.6–2.6)
MCHC RBC-ENTMCNC: 31.1 PG — SIGNIFICANT CHANGE UP (ref 27–34)
MCHC RBC-ENTMCNC: 32.8 GM/DL — SIGNIFICANT CHANGE UP (ref 32–36)
MCV RBC AUTO: 94.8 FL — SIGNIFICANT CHANGE UP (ref 80–100)
PLATELET # BLD AUTO: 334 K/UL — SIGNIFICANT CHANGE UP (ref 150–400)
POTASSIUM SERPL-MCNC: 4.4 MMOL/L — SIGNIFICANT CHANGE UP (ref 3.5–5.3)
POTASSIUM SERPL-SCNC: 4.4 MMOL/L — SIGNIFICANT CHANGE UP (ref 3.5–5.3)
RBC # BLD: 2.95 M/UL — LOW (ref 3.8–5.2)
RBC # FLD: 17.4 % — HIGH (ref 10.3–14.5)
SODIUM SERPL-SCNC: 138 MMOL/L — SIGNIFICANT CHANGE UP (ref 135–145)
WBC # BLD: 15.4 K/UL — HIGH (ref 3.8–10.5)
WBC # FLD AUTO: 15.4 K/UL — HIGH (ref 3.8–10.5)

## 2019-04-06 RX ADMIN — QUETIAPINE FUMARATE 25 MILLIGRAM(S): 200 TABLET, FILM COATED ORAL at 21:05

## 2019-04-06 RX ADMIN — Medication 500 MILLIGRAM(S): at 12:53

## 2019-04-06 RX ADMIN — GABAPENTIN 300 MILLIGRAM(S): 400 CAPSULE ORAL at 21:05

## 2019-04-06 RX ADMIN — Medication 3 MILLILITER(S): at 16:50

## 2019-04-06 RX ADMIN — BUDESONIDE AND FORMOTEROL FUMARATE DIHYDRATE 2 PUFF(S): 160; 4.5 AEROSOL RESPIRATORY (INHALATION) at 09:24

## 2019-04-06 RX ADMIN — Medication 1 TABLET(S): at 12:53

## 2019-04-06 RX ADMIN — Medication 300 MILLIGRAM(S): at 12:53

## 2019-04-06 RX ADMIN — FAMOTIDINE 20 MILLIGRAM(S): 10 INJECTION INTRAVENOUS at 12:53

## 2019-04-06 RX ADMIN — Medication 3 MILLILITER(S): at 06:50

## 2019-04-06 RX ADMIN — LISINOPRIL 5 MILLIGRAM(S): 2.5 TABLET ORAL at 21:09

## 2019-04-06 RX ADMIN — Medication 1 APPLICATION(S): at 14:16

## 2019-04-06 RX ADMIN — GABAPENTIN 300 MILLIGRAM(S): 400 CAPSULE ORAL at 12:51

## 2019-04-06 RX ADMIN — Medication 100 MILLIGRAM(S): at 21:05

## 2019-04-06 RX ADMIN — APIXABAN 5 MILLIGRAM(S): 2.5 TABLET, FILM COATED ORAL at 16:50

## 2019-04-06 RX ADMIN — TIZANIDINE 4 MILLIGRAM(S): 4 TABLET ORAL at 21:05

## 2019-04-06 RX ADMIN — Medication 12.5 MILLIGRAM(S): at 09:24

## 2019-04-06 RX ADMIN — Medication 1.5 MILLIGRAM(S): at 21:09

## 2019-04-06 RX ADMIN — TIZANIDINE 4 MILLIGRAM(S): 4 TABLET ORAL at 09:25

## 2019-04-06 RX ADMIN — Medication 12.5 MILLIGRAM(S): at 21:10

## 2019-04-06 RX ADMIN — Medication 1 APPLICATION(S): at 06:50

## 2019-04-06 RX ADMIN — Medication 3 MILLILITER(S): at 12:51

## 2019-04-06 RX ADMIN — Medication 100 MILLIGRAM(S): at 06:50

## 2019-04-06 RX ADMIN — MIDODRINE HYDROCHLORIDE 10 MILLIGRAM(S): 2.5 TABLET ORAL at 14:16

## 2019-04-06 RX ADMIN — MIDODRINE HYDROCHLORIDE 10 MILLIGRAM(S): 2.5 TABLET ORAL at 21:05

## 2019-04-06 RX ADMIN — Medication 25 MILLIGRAM(S): at 09:24

## 2019-04-06 RX ADMIN — Medication 100 MILLIGRAM(S): at 14:16

## 2019-04-06 RX ADMIN — BUDESONIDE AND FORMOTEROL FUMARATE DIHYDRATE 2 PUFF(S): 160; 4.5 AEROSOL RESPIRATORY (INHALATION) at 21:05

## 2019-04-06 RX ADMIN — APIXABAN 5 MILLIGRAM(S): 2.5 TABLET, FILM COATED ORAL at 06:50

## 2019-04-06 RX ADMIN — Medication 1 APPLICATION(S): at 21:06

## 2019-04-06 RX ADMIN — Medication 250 MILLIGRAM(S): at 12:24

## 2019-04-06 NOTE — PROGRESS NOTE ADULT - SUBJECTIVE AND OBJECTIVE BOX
---___---___---___---___---___---___ ---___---___---___---___---___---___---___---___---___---                    <<<  M E D I C A L   A T T E N D I N G    F O L L O W    U P   N O T E  >>>    patient awake and doing well  now sp picc line  and getting iv abx      ---___---___---___---___---___---      <<<  MEDICATIONS:  >>>    MEDICATIONS  (STANDING):  ALBUTerol    90 MICROgram(s) HFA Inhaler 1 Puff(s) Inhalation every 4 hours  ALBUTerol/ipratropium for Nebulization 3 milliLiter(s) Nebulizer every 6 hours  apixaban 5 milliGRAM(s) Oral every 12 hours  ascorbic acid 500 milliGRAM(s) Oral daily  buDESOnide 160 MICROgram(s)/formoterol 4.5 MICROgram(s) Inhaler 2 Puff(s) Inhalation two times a day  clonazePAM Tablet 1.5 milliGRAM(s) Oral <User Schedule>  ergocalciferol 54983 Unit(s) Oral every week  famotidine    Tablet 20 milliGRAM(s) Oral daily  ferrous    sulfate Liquid 300 milliGRAM(s) Enteral Tube daily  gabapentin   Solution 300 milliGRAM(s) Oral two times a day  hydrocortisone sodium succinate Injectable 100 milliGRAM(s) IV Push every 8 hours  lisinopril 5 milliGRAM(s) Enteral Tube at bedtime  Medical Marijuana Oil 1 milliLiter(s),Medical Marijuana Oil 1 milliLiter(s) 1 milliLiter(s) Oral <User Schedule>  metoprolol tartrate 12.5 milliGRAM(s) Enteral Tube two times a day  midodrine 10 milliGRAM(s) Oral every 8 hours  multivitamin 1 Tablet(s) Oral daily  petrolatum white Ointment 1 Application(s) Topical three times a day  QUEtiapine 25 milliGRAM(s) Oral at bedtime  sodium chloride 0.9% Bolus 500 milliLiter(s) IV Bolus once  tiotropium 18 MICROgram(s) Capsule 1 Capsule(s) Inhalation daily  tiZANidine 4 milliGRAM(s) Oral <User Schedule>  vancomycin  IVPB 1000 milliGRAM(s) IV Intermittent every 24 hours      MEDICATIONS  (PRN):  acetaminophen    Suspension .. 650 milliGRAM(s) Oral every 6 hours PRN Temp greater or equal to 38C (100.4F), Mild Pain (1 - 3), Moderate Pain (4 - 6)  diphenhydrAMINE   Elixir 25 milliGRAM(s) Oral every 6 hours PRN Rash and/or Itching  nystatin Powder 1 Application(s) Topical two times a day PRN rash/itchiness  oxyCODONE    IR 5 milliGRAM(s) Oral every 6 hours PRN Moderate Pain (4 - 6)/Severe Pain (7 - 10)  sodium chloride 0.65% Nasal 1 Spray(s) Both Nostrils three times a day PRN Nasal Congestion       ---___---___---___---___---___---     <<<REVIEW OF SYSTEM: >>>    unable to obtain      ---___---___---___---___---___---          <<<  VITAL SIGNS: >>>    T(F): 97.7 (04-06-19 @ 12:26), Max: 99.2 (04-05-19 @ 20:00)  HR: 88 (04-06-19 @ 12:26) (82 - 104)  BP: 125/67 (04-06-19 @ 12:26) (124/69 - 166/88)  RR: 18 (04-06-19 @ 12:26) (18 - 22)  SpO2: 97% (04-06-19 @ 12:26) (93% - 100%)  Wt(kg): --  CAPILLARY BLOOD GLUCOSE        I&O's Summary    05 Apr 2019 07:01  -  06 Apr 2019 07:00  --------------------------------------------------------  IN: 730 mL / OUT: 1350 mL / NET: -620 mL         ---___---___---___---___---___---                       PHYSICAL EXAM:    GEN: A&O X 0 , NAD , comfortable  HEENT: NCAT, PERRL, MMM, no scleral icterus, hearing intact  NECK: Supple, No JVD  CVS: S1S2 , regular , No M/R/G appreciated  PULM: CTA B/L,  no W/R/R appreciated  ABD.: soft. non tender, non distended,  bowel sounds present peg noted   Extrem: intact pulses , no edema noted  left leg with in brace noted   Derm: No rash or ecchymosis noted      ---___---___---___---___---___---     <<<  LAB AND IMAGING: >>>                          9.2    15.4  )-----------( 334      ( 06 Apr 2019 07:12 )             28.0               04-06    138  |  97  |  34<H>  ----------------------------<  214<H>  4.4   |  30  |  <0.30<L>    Ca    8.1<L>      06 Apr 2019 07:12  Mg     2.0     04-06      PTT - ( 05 Apr 2019 14:32 )  PTT:54.8 sec                           [All pertinent / recent available Imaging reports and other labs reviewed]     ---___---___---___---___---___---___ ---___---___---___---___---           <<<  A S S E S S M E N T   A N D   P L A N :  >>>  patient with   sepsis likely from hardware  continue iv  vancomycin  fracture  left femur supportive care and pain medication   asthma  contiue meds   dementia  supportive care   chronic pain  on medical marijuana  and oxycodone   now on eliquis now for dvt prevention due to traumatic rupture    -GI/DVT Prophylaxis.    --------------------------------------------  Case discussed with    Education given on     >>______________________<<      Deniz Bolden .         phone   [phone]

## 2019-04-07 LAB
ANION GAP SERPL CALC-SCNC: 12 MMOL/L — SIGNIFICANT CHANGE UP (ref 5–17)
APPEARANCE UR: CLEAR — SIGNIFICANT CHANGE UP
BILIRUB UR-MCNC: NEGATIVE — SIGNIFICANT CHANGE UP
BUN SERPL-MCNC: 35 MG/DL — HIGH (ref 7–23)
CALCIUM SERPL-MCNC: 8.2 MG/DL — LOW (ref 8.4–10.5)
CHLORIDE SERPL-SCNC: 98 MMOL/L — SIGNIFICANT CHANGE UP (ref 96–108)
CO2 SERPL-SCNC: 31 MMOL/L — SIGNIFICANT CHANGE UP (ref 22–31)
COLOR SPEC: YELLOW — SIGNIFICANT CHANGE UP
CREAT SERPL-MCNC: <0.3 MG/DL — LOW (ref 0.5–1.3)
DIFF PNL FLD: ABNORMAL
GLUCOSE SERPL-MCNC: 214 MG/DL — HIGH (ref 70–99)
GLUCOSE UR QL: ABNORMAL
HCT VFR BLD CALC: 28.5 % — LOW (ref 34.5–45)
HCT VFR BLD CALC: 29.2 % — LOW (ref 34.5–45)
HGB BLD-MCNC: 9 G/DL — LOW (ref 11.5–15.5)
HGB BLD-MCNC: 9.4 G/DL — LOW (ref 11.5–15.5)
KETONES UR-MCNC: NEGATIVE — SIGNIFICANT CHANGE UP
LACTATE SERPL-SCNC: 3.7 MMOL/L — HIGH (ref 0.7–2)
LEUKOCYTE ESTERASE UR-ACNC: ABNORMAL
MAGNESIUM SERPL-MCNC: 2 MG/DL — SIGNIFICANT CHANGE UP (ref 1.6–2.6)
MCHC RBC-ENTMCNC: 30.1 PG — SIGNIFICANT CHANGE UP (ref 27–34)
MCHC RBC-ENTMCNC: 30.2 PG — SIGNIFICANT CHANGE UP (ref 27–34)
MCHC RBC-ENTMCNC: 31.7 GM/DL — LOW (ref 32–36)
MCHC RBC-ENTMCNC: 32.1 GM/DL — SIGNIFICANT CHANGE UP (ref 32–36)
MCV RBC AUTO: 93.7 FL — SIGNIFICANT CHANGE UP (ref 80–100)
MCV RBC AUTO: 95.5 FL — SIGNIFICANT CHANGE UP (ref 80–100)
NITRITE UR-MCNC: NEGATIVE — SIGNIFICANT CHANGE UP
PH UR: 6.5 — SIGNIFICANT CHANGE UP (ref 5–8)
PLATELET # BLD AUTO: 351 K/UL — SIGNIFICANT CHANGE UP (ref 150–400)
PLATELET # BLD AUTO: 397 K/UL — SIGNIFICANT CHANGE UP (ref 150–400)
POTASSIUM SERPL-MCNC: 3.6 MMOL/L — SIGNIFICANT CHANGE UP (ref 3.5–5.3)
POTASSIUM SERPL-SCNC: 3.6 MMOL/L — SIGNIFICANT CHANGE UP (ref 3.5–5.3)
PROT UR-MCNC: ABNORMAL
RBC # BLD: 2.98 M/UL — LOW (ref 3.8–5.2)
RBC # BLD: 3.11 M/UL — LOW (ref 3.8–5.2)
RBC # FLD: 17.1 % — HIGH (ref 10.3–14.5)
RBC # FLD: 17.6 % — HIGH (ref 10.3–14.5)
SODIUM SERPL-SCNC: 141 MMOL/L — SIGNIFICANT CHANGE UP (ref 135–145)
SP GR SPEC: 1.02 — SIGNIFICANT CHANGE UP (ref 1.01–1.02)
UROBILINOGEN FLD QL: NEGATIVE — SIGNIFICANT CHANGE UP
WBC # BLD: 19.6 K/UL — HIGH (ref 3.8–10.5)
WBC # BLD: 21.2 K/UL — HIGH (ref 3.8–10.5)
WBC # FLD AUTO: 19.6 K/UL — HIGH (ref 3.8–10.5)
WBC # FLD AUTO: 21.2 K/UL — HIGH (ref 3.8–10.5)

## 2019-04-07 RX ORDER — ACETAMINOPHEN 500 MG
750 TABLET ORAL ONCE
Qty: 0 | Refills: 0 | Status: COMPLETED | OUTPATIENT
Start: 2019-04-07 | End: 2019-04-07

## 2019-04-07 RX ADMIN — Medication 500 MILLIGRAM(S): at 09:39

## 2019-04-07 RX ADMIN — QUETIAPINE FUMARATE 25 MILLIGRAM(S): 200 TABLET, FILM COATED ORAL at 21:04

## 2019-04-07 RX ADMIN — Medication 1 APPLICATION(S): at 13:35

## 2019-04-07 RX ADMIN — Medication 1 TABLET(S): at 09:39

## 2019-04-07 RX ADMIN — Medication 300 MILLIGRAM(S): at 23:26

## 2019-04-07 RX ADMIN — Medication 100 MILLIGRAM(S): at 06:46

## 2019-04-07 RX ADMIN — Medication 3 MILLILITER(S): at 06:46

## 2019-04-07 RX ADMIN — Medication 25 MILLIGRAM(S): at 10:02

## 2019-04-07 RX ADMIN — Medication 100 MILLIGRAM(S): at 21:05

## 2019-04-07 RX ADMIN — GABAPENTIN 300 MILLIGRAM(S): 400 CAPSULE ORAL at 21:03

## 2019-04-07 RX ADMIN — Medication 12.5 MILLIGRAM(S): at 09:39

## 2019-04-07 RX ADMIN — Medication 12.5 MILLIGRAM(S): at 21:04

## 2019-04-07 RX ADMIN — Medication 250 MILLIGRAM(S): at 11:04

## 2019-04-07 RX ADMIN — Medication 650 MILLIGRAM(S): at 12:30

## 2019-04-07 RX ADMIN — TIZANIDINE 4 MILLIGRAM(S): 4 TABLET ORAL at 09:39

## 2019-04-07 RX ADMIN — Medication 3 MILLILITER(S): at 23:38

## 2019-04-07 RX ADMIN — Medication 1 APPLICATION(S): at 06:46

## 2019-04-07 RX ADMIN — TIZANIDINE 4 MILLIGRAM(S): 4 TABLET ORAL at 21:04

## 2019-04-07 RX ADMIN — Medication 3 MILLILITER(S): at 00:07

## 2019-04-07 RX ADMIN — Medication 3 MILLILITER(S): at 17:18

## 2019-04-07 RX ADMIN — APIXABAN 5 MILLIGRAM(S): 2.5 TABLET, FILM COATED ORAL at 06:46

## 2019-04-07 RX ADMIN — GABAPENTIN 300 MILLIGRAM(S): 400 CAPSULE ORAL at 09:39

## 2019-04-07 RX ADMIN — Medication 3 MILLILITER(S): at 11:04

## 2019-04-07 RX ADMIN — FAMOTIDINE 20 MILLIGRAM(S): 10 INJECTION INTRAVENOUS at 09:39

## 2019-04-07 RX ADMIN — Medication 1.5 MILLIGRAM(S): at 21:09

## 2019-04-07 RX ADMIN — Medication 300 MILLIGRAM(S): at 09:39

## 2019-04-07 RX ADMIN — LISINOPRIL 5 MILLIGRAM(S): 2.5 TABLET ORAL at 21:04

## 2019-04-07 RX ADMIN — Medication 1 APPLICATION(S): at 21:04

## 2019-04-07 RX ADMIN — Medication 650 MILLIGRAM(S): at 11:41

## 2019-04-07 RX ADMIN — BUDESONIDE AND FORMOTEROL FUMARATE DIHYDRATE 2 PUFF(S): 160; 4.5 AEROSOL RESPIRATORY (INHALATION) at 09:39

## 2019-04-07 RX ADMIN — APIXABAN 5 MILLIGRAM(S): 2.5 TABLET, FILM COATED ORAL at 17:18

## 2019-04-07 RX ADMIN — Medication 100 MILLIGRAM(S): at 13:37

## 2019-04-07 NOTE — PROGRESS NOTE ADULT - SUBJECTIVE AND OBJECTIVE BOX
---___---___---___---___---___---___ ---___---___---___---___---___---___---___---___---___---                    <<<  M E D I C A L   A T T E N D I N G    F O L L O W    U P   N O T E  >>>  unchanged doing well. no acute changes      ---___---___---___---___---___---      <<<  MEDICATIONS:  >>>    MEDICATIONS  (STANDING):  ALBUTerol    90 MICROgram(s) HFA Inhaler 1 Puff(s) Inhalation every 4 hours  ALBUTerol/ipratropium for Nebulization 3 milliLiter(s) Nebulizer every 6 hours  apixaban 5 milliGRAM(s) Oral every 12 hours  ascorbic acid 500 milliGRAM(s) Oral daily  buDESOnide 160 MICROgram(s)/formoterol 4.5 MICROgram(s) Inhaler 2 Puff(s) Inhalation two times a day  clonazePAM Tablet 1.5 milliGRAM(s) Oral <User Schedule>  ergocalciferol 60112 Unit(s) Oral every week  famotidine    Tablet 20 milliGRAM(s) Oral daily  ferrous    sulfate Liquid 300 milliGRAM(s) Enteral Tube daily  gabapentin   Solution 300 milliGRAM(s) Oral two times a day  hydrocortisone sodium succinate Injectable 100 milliGRAM(s) IV Push every 8 hours  lisinopril 5 milliGRAM(s) Enteral Tube at bedtime  Medical Marijuana Oil 1 milliLiter(s),Medical Marijuana Oil 1 milliLiter(s) 1 milliLiter(s) Oral <User Schedule>  metoprolol tartrate 12.5 milliGRAM(s) Enteral Tube two times a day  midodrine 10 milliGRAM(s) Oral every 8 hours  multivitamin 1 Tablet(s) Oral daily  petrolatum white Ointment 1 Application(s) Topical three times a day  QUEtiapine 25 milliGRAM(s) Oral at bedtime  sodium chloride 0.9% Bolus 500 milliLiter(s) IV Bolus once  tiotropium 18 MICROgram(s) Capsule 1 Capsule(s) Inhalation daily  tiZANidine 4 milliGRAM(s) Oral <User Schedule>  vancomycin  IVPB 1000 milliGRAM(s) IV Intermittent every 24 hours      MEDICATIONS  (PRN):  acetaminophen    Suspension .. 650 milliGRAM(s) Oral every 6 hours PRN Temp greater or equal to 38C (100.4F), Mild Pain (1 - 3), Moderate Pain (4 - 6)  diphenhydrAMINE   Elixir 25 milliGRAM(s) Oral every 6 hours PRN Rash and/or Itching  nystatin Powder 1 Application(s) Topical two times a day PRN rash/itchiness  oxyCODONE    IR 5 milliGRAM(s) Oral every 6 hours PRN Moderate Pain (4 - 6)/Severe Pain (7 - 10)  sodium chloride 0.65% Nasal 1 Spray(s) Both Nostrils three times a day PRN Nasal Congestion       ---___---___---___---___---___---     <<<REVIEW OF SYSTEM: >>>    unable to obtain      ---___---___---___---___---___---          <<<  VITAL SIGNS: >>>    T(F): 97.9 (04-07-19 @ 04:41), Max: 99.5 (04-06-19 @ 21:06)  HR: 98 (04-07-19 @ 04:41) (88 - 98)  BP: 118/66 (04-07-19 @ 04:41) (118/66 - 125/71)  RR: 18 (04-07-19 @ 04:41) (18 - 18)  SpO2: 96% (04-07-19 @ 04:41) (96% - 97%)  Wt(kg): --  CAPILLARY BLOOD GLUCOSE      POCT Blood Glucose.: 249 mg/dL (06 Apr 2019 18:11)    I&O's Summary    06 Apr 2019 07:01  -  07 Apr 2019 07:00  --------------------------------------------------------  IN: 320 mL / OUT: 910 mL / NET: -590 mL         ---___---___---___---___---___---                       PHYSICAL EXAM:    GEN: A&O X 0 , NAD , comfortable  HEENT: NCAT, PERRL, MMM, no scleral icterus, hearing intact  NECK: Supple, No JVD  CVS: S1S2 , regular , No M/R/G appreciated  PULM: CTA B/L,  no W/R/R appreciated  ABD.: soft. non tender, non distended,  bowel sounds present peg noted   Extrem: intact pulses , no edema noted  Derm: No rash or ecchymosis noted sacral decubitus noted       ---___---___---___---___---___---     <<<  LAB AND IMAGING: >>>                          9.0    19.6  )-----------( 351      ( 07 Apr 2019 07:04 )             28.5               04-07    141  |  98  |  35<H>  ----------------------------<  214<H>  3.6   |  31  |  <0.30<L>    Ca    8.2<L>      07 Apr 2019 07:04  Mg     2.0     04-07      PTT - ( 05 Apr 2019 14:32 )  PTT:54.8 sec                           [All pertinent / recent available Imaging reports and other labs reviewed]     ---___---___---___---___---___---___ ---___---___---___---___---           <<<  A S S E S S M E N T   A N D   P L A N :  >>>    fractured femur   fever  asthma   functional quadraplegia   chronic pain   dementia    contiue current care   picc in place     awaiting clearance by id   start dc planning for home  vanco   continue meds   continue medical marijuana and also (oxycodone for pain prn )      -GI/DVT Prophylaxis.    --------------------------------------------  Case discussed with   Education given on     >>______________________<<      Deniz Bolden .         phone   [phone]

## 2019-04-08 LAB
ANION GAP SERPL CALC-SCNC: 13 MMOL/L — SIGNIFICANT CHANGE UP (ref 5–17)
APPEARANCE UR: CLEAR — SIGNIFICANT CHANGE UP
BACTERIA # UR AUTO: NEGATIVE — SIGNIFICANT CHANGE UP
BASE EXCESS BLDA CALC-SCNC: 7.5 MMOL/L — HIGH (ref -2–2)
BILIRUB UR-MCNC: NEGATIVE — SIGNIFICANT CHANGE UP
BUN SERPL-MCNC: 37 MG/DL — HIGH (ref 7–23)
CALCIUM SERPL-MCNC: 7.7 MG/DL — LOW (ref 8.4–10.5)
CHLORIDE SERPL-SCNC: 99 MMOL/L — SIGNIFICANT CHANGE UP (ref 96–108)
CO2 BLDA-SCNC: 31 MMOL/L — HIGH (ref 22–30)
CO2 SERPL-SCNC: 30 MMOL/L — SIGNIFICANT CHANGE UP (ref 22–31)
COLOR SPEC: YELLOW — SIGNIFICANT CHANGE UP
CREAT SERPL-MCNC: <0.3 MG/DL — LOW (ref 0.5–1.3)
CULTURE RESULTS: SIGNIFICANT CHANGE UP
CULTURE RESULTS: SIGNIFICANT CHANGE UP
DIFF PNL FLD: NEGATIVE — SIGNIFICANT CHANGE UP
EPI CELLS # UR: 1 /HPF — SIGNIFICANT CHANGE UP
GAS PNL BLDA: SIGNIFICANT CHANGE UP
GLUCOSE SERPL-MCNC: 242 MG/DL — HIGH (ref 70–99)
GLUCOSE UR QL: ABNORMAL
HCO3 BLDA-SCNC: 30 MMOL/L — HIGH (ref 21–29)
HCT VFR BLD CALC: 26.3 % — LOW (ref 34.5–45)
HGB BLD-MCNC: 8.7 G/DL — LOW (ref 11.5–15.5)
HOROWITZ INDEX BLDA+IHG-RTO: 28 — SIGNIFICANT CHANGE UP
HYALINE CASTS # UR AUTO: 0 /LPF — SIGNIFICANT CHANGE UP (ref 0–2)
KETONES UR-MCNC: NEGATIVE — SIGNIFICANT CHANGE UP
LACTATE SERPL-SCNC: 2.8 MMOL/L — HIGH (ref 0.7–2)
LEUKOCYTE ESTERASE UR-ACNC: ABNORMAL
MAGNESIUM SERPL-MCNC: 1.9 MG/DL — SIGNIFICANT CHANGE UP (ref 1.6–2.6)
MCHC RBC-ENTMCNC: 31.5 PG — SIGNIFICANT CHANGE UP (ref 27–34)
MCHC RBC-ENTMCNC: 33 GM/DL — SIGNIFICANT CHANGE UP (ref 32–36)
MCV RBC AUTO: 95.4 FL — SIGNIFICANT CHANGE UP (ref 80–100)
NITRITE UR-MCNC: NEGATIVE — SIGNIFICANT CHANGE UP
PCO2 BLDA: 35 MMHG — SIGNIFICANT CHANGE UP (ref 32–46)
PH BLDA: 7.55 — HIGH (ref 7.35–7.45)
PH UR: 6 — SIGNIFICANT CHANGE UP (ref 5–8)
PLATELET # BLD AUTO: 324 K/UL — SIGNIFICANT CHANGE UP (ref 150–400)
PO2 BLDA: 131 MMHG — HIGH (ref 74–108)
POTASSIUM SERPL-MCNC: 3 MMOL/L — LOW (ref 3.5–5.3)
POTASSIUM SERPL-SCNC: 3 MMOL/L — LOW (ref 3.5–5.3)
PROT UR-MCNC: ABNORMAL
RAPID RVP RESULT: SIGNIFICANT CHANGE UP
RBC # BLD: 2.76 M/UL — LOW (ref 3.8–5.2)
RBC # FLD: 17.4 % — HIGH (ref 10.3–14.5)
RBC CASTS # UR COMP ASSIST: 18 /HPF — HIGH (ref 0–4)
SAO2 % BLDA: 100 % — HIGH (ref 92–96)
SODIUM SERPL-SCNC: 142 MMOL/L — SIGNIFICANT CHANGE UP (ref 135–145)
SP GR SPEC: 1.03 — HIGH (ref 1.01–1.02)
SPECIMEN SOURCE: SIGNIFICANT CHANGE UP
SPECIMEN SOURCE: SIGNIFICANT CHANGE UP
UROBILINOGEN FLD QL: NEGATIVE — SIGNIFICANT CHANGE UP
WBC # BLD: 17.1 K/UL — HIGH (ref 3.8–10.5)
WBC # FLD AUTO: 17.1 K/UL — HIGH (ref 3.8–10.5)
WBC UR QL: 57 /HPF — HIGH (ref 0–5)

## 2019-04-08 PROCEDURE — 71045 X-RAY EXAM CHEST 1 VIEW: CPT | Mod: 26

## 2019-04-08 RX ORDER — POTASSIUM CHLORIDE 20 MEQ
20 PACKET (EA) ORAL EVERY 4 HOURS
Qty: 0 | Refills: 0 | Status: COMPLETED | OUTPATIENT
Start: 2019-04-08 | End: 2019-04-08

## 2019-04-08 RX ORDER — MEROPENEM 1 G/30ML
1000 INJECTION INTRAVENOUS ONCE
Qty: 0 | Refills: 0 | Status: COMPLETED | OUTPATIENT
Start: 2019-04-08 | End: 2019-04-08

## 2019-04-08 RX ORDER — SODIUM CHLORIDE 9 MG/ML
500 INJECTION INTRAMUSCULAR; INTRAVENOUS; SUBCUTANEOUS ONCE
Qty: 0 | Refills: 0 | Status: COMPLETED | OUTPATIENT
Start: 2019-04-08 | End: 2019-04-08

## 2019-04-08 RX ADMIN — Medication 1 APPLICATION(S): at 06:01

## 2019-04-08 RX ADMIN — BUDESONIDE AND FORMOTEROL FUMARATE DIHYDRATE 2 PUFF(S): 160; 4.5 AEROSOL RESPIRATORY (INHALATION) at 23:01

## 2019-04-08 RX ADMIN — APIXABAN 5 MILLIGRAM(S): 2.5 TABLET, FILM COATED ORAL at 06:02

## 2019-04-08 RX ADMIN — Medication 3 MILLILITER(S): at 11:02

## 2019-04-08 RX ADMIN — GABAPENTIN 300 MILLIGRAM(S): 400 CAPSULE ORAL at 22:46

## 2019-04-08 RX ADMIN — Medication 1 APPLICATION(S): at 13:02

## 2019-04-08 RX ADMIN — LISINOPRIL 5 MILLIGRAM(S): 2.5 TABLET ORAL at 22:45

## 2019-04-08 RX ADMIN — Medication 1 APPLICATION(S): at 23:33

## 2019-04-08 RX ADMIN — Medication 250 MILLIGRAM(S): at 11:02

## 2019-04-08 RX ADMIN — MEROPENEM 100 MILLIGRAM(S): 1 INJECTION INTRAVENOUS at 03:35

## 2019-04-08 RX ADMIN — Medication 650 MILLIGRAM(S): at 14:51

## 2019-04-08 RX ADMIN — Medication 25 MILLIGRAM(S): at 09:58

## 2019-04-08 RX ADMIN — Medication 20 MILLIEQUIVALENT(S): at 18:38

## 2019-04-08 RX ADMIN — FAMOTIDINE 20 MILLIGRAM(S): 10 INJECTION INTRAVENOUS at 09:59

## 2019-04-08 RX ADMIN — TIZANIDINE 4 MILLIGRAM(S): 4 TABLET ORAL at 22:45

## 2019-04-08 RX ADMIN — Medication 500 MILLIGRAM(S): at 09:59

## 2019-04-08 RX ADMIN — Medication 100 MILLIGRAM(S): at 22:57

## 2019-04-08 RX ADMIN — Medication 650 MILLIGRAM(S): at 17:02

## 2019-04-08 RX ADMIN — Medication 300 MILLIGRAM(S): at 09:59

## 2019-04-08 RX ADMIN — QUETIAPINE FUMARATE 25 MILLIGRAM(S): 200 TABLET, FILM COATED ORAL at 22:46

## 2019-04-08 RX ADMIN — Medication 1.5 MILLIGRAM(S): at 22:46

## 2019-04-08 RX ADMIN — GABAPENTIN 300 MILLIGRAM(S): 400 CAPSULE ORAL at 09:59

## 2019-04-08 RX ADMIN — Medication 3 MILLILITER(S): at 06:02

## 2019-04-08 RX ADMIN — Medication 12.5 MILLIGRAM(S): at 23:27

## 2019-04-08 RX ADMIN — Medication 3 MILLILITER(S): at 18:37

## 2019-04-08 RX ADMIN — Medication 100 MILLIGRAM(S): at 06:02

## 2019-04-08 RX ADMIN — Medication 12.5 MILLIGRAM(S): at 09:59

## 2019-04-08 RX ADMIN — SODIUM CHLORIDE 500 MILLILITER(S): 9 INJECTION INTRAMUSCULAR; INTRAVENOUS; SUBCUTANEOUS at 00:45

## 2019-04-08 RX ADMIN — Medication 100 MILLIGRAM(S): at 18:37

## 2019-04-08 RX ADMIN — MIDODRINE HYDROCHLORIDE 10 MILLIGRAM(S): 2.5 TABLET ORAL at 22:45

## 2019-04-08 RX ADMIN — Medication 20 MILLIEQUIVALENT(S): at 14:14

## 2019-04-08 RX ADMIN — Medication 750 MILLIGRAM(S): at 00:57

## 2019-04-08 RX ADMIN — Medication 1 TABLET(S): at 09:59

## 2019-04-08 RX ADMIN — TIZANIDINE 4 MILLIGRAM(S): 4 TABLET ORAL at 09:59

## 2019-04-08 RX ADMIN — APIXABAN 5 MILLIGRAM(S): 2.5 TABLET, FILM COATED ORAL at 18:38

## 2019-04-08 NOTE — PROGRESS NOTE ADULT - SUBJECTIVE AND OBJECTIVE BOX
---___---___---___---___---___---___ ---___---___---___---___---___---___---___---___---___---                    <<<  M E D I C A L   A T T E N D I N G    F O L L O W    U P   N O T E  >>>    now febrile on cooling blanket      ---___---___---___---___---___---      <<<  MEDICATIONS:  >>>    MEDICATIONS  (STANDING):  ALBUTerol    90 MICROgram(s) HFA Inhaler 1 Puff(s) Inhalation every 4 hours  ALBUTerol/ipratropium for Nebulization 3 milliLiter(s) Nebulizer every 6 hours  apixaban 5 milliGRAM(s) Oral every 12 hours  ascorbic acid 500 milliGRAM(s) Oral daily  buDESOnide 160 MICROgram(s)/formoterol 4.5 MICROgram(s) Inhaler 2 Puff(s) Inhalation two times a day  clonazePAM Tablet 1.5 milliGRAM(s) Oral <User Schedule>  ergocalciferol 15859 Unit(s) Oral every week  famotidine    Tablet 20 milliGRAM(s) Oral daily  ferrous    sulfate Liquid 300 milliGRAM(s) Enteral Tube daily  gabapentin   Solution 300 milliGRAM(s) Oral two times a day  hydrocortisone sodium succinate Injectable 100 milliGRAM(s) IV Push every 8 hours  lisinopril 5 milliGRAM(s) Enteral Tube at bedtime  Medical Marijuana Oil 1 milliLiter(s),Medical Marijuana Oil 1 milliLiter(s) 1 milliLiter(s) Oral <User Schedule>  metoprolol tartrate 12.5 milliGRAM(s) Enteral Tube two times a day  midodrine 10 milliGRAM(s) Oral every 8 hours  multivitamin 1 Tablet(s) Oral daily  petrolatum white Ointment 1 Application(s) Topical three times a day  potassium chloride   Solution 20 milliEquivalent(s) Oral every 4 hours  QUEtiapine 25 milliGRAM(s) Oral at bedtime  sodium chloride 0.9% Bolus 500 milliLiter(s) IV Bolus once  tiotropium 18 MICROgram(s) Capsule 1 Capsule(s) Inhalation daily  tiZANidine 4 milliGRAM(s) Oral <User Schedule>  vancomycin  IVPB 1000 milliGRAM(s) IV Intermittent every 24 hours      MEDICATIONS  (PRN):  acetaminophen    Suspension .. 650 milliGRAM(s) Oral every 6 hours PRN Temp greater or equal to 38C (100.4F), Mild Pain (1 - 3), Moderate Pain (4 - 6)  diphenhydrAMINE   Elixir 25 milliGRAM(s) Oral every 6 hours PRN Rash and/or Itching  nystatin Powder 1 Application(s) Topical two times a day PRN rash/itchiness  oxyCODONE    IR 5 milliGRAM(s) Oral every 6 hours PRN Moderate Pain (4 - 6)/Severe Pain (7 - 10)  sodium chloride 0.65% Nasal 1 Spray(s) Both Nostrils three times a day PRN Nasal Congestion       ---___---___---___---___---___---     <<<REVIEW OF SYSTEM: >>>    unable to obtain      ---___---___---___---___---___---          <<<  VITAL SIGNS: >>>    T(F): 102.2 (19 @ 17:01), Max: 102.4 (19 @ 22:03)  HR: 97 (19 @ 12:15) (90 - 108)  BP: 140/64 (19 @ 12:15) (108/67 - 164/88)  RR: 18 (19 @ 12:15) (18 - 20)  SpO2: 96% (19 @ 12:15) (95% - 100%)  Wt(kg): --  CAPILLARY BLOOD GLUCOSE      POCT Blood Glucose.: 249 mg/dL (2019 18:11)    I&O's Summary    2019 07:01  -  2019 07:00  --------------------------------------------------------  IN: 1216 mL / OUT: 1300 mL / NET: -84 mL         ---___---___---___---___---___---                       PHYSICAL EXAM:    GEN: A&O X 0 , NAD , comfortable  HEENT: NCAT, PERRL, MMM, no scleral icterus, hearing intact  NECK: Supple, No JVD  CVS: S1S2 , regular , No M/R/G appreciated  PULM: CTA B/L,  no W/R/R appreciated  ABD.: soft. non tender, non distended,  bowel sounds present peg noted  Extrem: intact pulses , no edema noted  Derm: No rash or ecchymosis noted  PSYCH: normal mood, no depression, not anxious     ---___---___---___---___---___---     <<<  LAB AND IMAGING: >>>                          8.7    17.1  )-----------( 324      ( 2019 06:05 )             26.3               04-08    142  |  99  |  37<H>  ----------------------------<  242<H>  3.0<L>   |  30  |  <0.30<L>    Ca    7.7<L>      2019 06:05  Mg     1.9     04-08             Urinalysis Basic - ( 2019 22:48 )    Color: Yellow / Appearance: Clear / S.023 / pH: x  Gluc: x / Ketone: Negative  / Bili: Negative / Urobili: Negative   Blood: x / Protein: 30 mg/dL / Nitrite: Negative   Leuk Esterase: Large / RBC: 15 /hpf /  /HPF   Sq Epi: x / Non Sq Epi: 0 /hpf / Bacteria: Negative                ABG - ( 2019 01:48 )  pH, Arterial: 7.55  pH, Blood: x     /  pCO2: 35    /  pO2: 131   / HCO3: 30    / Base Excess: 7.5   /  SaO2: 100                     [All pertinent / recent available Imaging reports and other labs reviewed]     ---___---___---___---___---___---___ ---___---___---___---___---           <<<  A S S E S S M E N T   A N D   P L A N :  >>>    fever sepsis continue iv antibiotics   id following   fracture of the left leg acute continue medical marijuana and  oxycodone   dementia supportive care   agiation on Seroquel and klonopin and zoloft  will speak to  in regards to rectal tube         -GI/DVT Prophylaxis.    --------------------------------------------  Case discussed with   Education given on     >>______________________<<      Deniz Bolden .         phone   [phone]

## 2019-04-08 NOTE — CHART NOTE - NSCHARTNOTEFT_GEN_A_CORE
WHITE, MYRIAM  68y Female    Notified by RN T 102.4 at 2200. Patient seen and examined.  at bedside. Nonverbal. NAD.      Vital Signs   T 102.4     RR 18  / 73   02 Sat 95%       Physical Exam:   Constitutional: NAD.  Neuro:  Grossly intact   Cardiovascular: +S1 S2. RRR.  No murmurs.  No LE edema.  Respiratory:  Even, unlabored.  Clear lungs bilaterally. No wheezing, rhonchi, or crackles.  Gastrointestinal: +BS X4 active. Soft. Nontender. Nondistended, peg site C/D/I   Extremities/Vascular: +2 pulses bilaterally.   Skin:  warm, dry, PICC site C/D/I       < from: Xray Chest 1 View- PORTABLE-Urgent (04.05.19 @ 17:47) >    IMPRESSION:   Since 3/31/19 exam, new right basilar opacity, as above.  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 History was provided by patient's daughters, Tere and Angie at bedside. Patient has been having persistent fever at home up to 103F. She has appeared more lethargic for the last 2 days and has had labored breathing, but no cough. She has loose nonbloody stools. No recent emesis, melena or hematuria. Urine output via gavin catheter is normal yellow appearance and had "good output." Patient gets regular wound care but has persistent ulcers. Because of persistent fevers, pt was brought to ED. This patient was previously hospitalized from 2/26-3/18/2019 for fevers, found to have septic shock 2/2 UTI vs sacral ulcer vs PNA, requiring pressor support and MICU stay. 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 Offer was made to change the spacer and get cultures, however at that time, the  did not want to put the patient through surgery. Pt completed a course of meropenem, and was resumed on suppressive minocycline. (24 Mar 2019 20:54)    Persistent fevers   -Tylenol  -blood cxs  -UA UCx   -continue vancomycin   -CBC - WBC 21.2  -lactate - 3.7 - downtrending - NS bolus, f/u AM lactate   -f/u ID in AM     will continue to monitor   will endorse to day team     Bhargavi Soto PA-C  [id number]    Addendum:   Notified by RN  concerned about breathing at 1AM. Patient seen and examined. Lungs CTAB, no acute respiratory distress noted.  states breathing gotten better. No acute changes clinically from previous exam.     vitals   T 101.6   HR 96   /68   RR 23   02 Sat 96%     continue NC 02   ABG   temp downtrending - cooling blanket ordered   opacity seen on CXR - ?aspiration - meropenem x1 as per Dr. Gonzalez (ID) (allergy to PCN)   ID f/u in AM     will continue to monitor   will endorse to day team     Bhargavi Soto PA-C   [id number]    Addendum:   Patient seen and examined at bedside.  at bedside. RR = 20. No acute respiratory distress noted. Resting in bed comfortably. No acute changes from above.     Vital Signs Last 24 Hrs  T(C): 37.7 (08 Apr 2019 03:12), Max: 39.1 (07 Apr 2019 22:03)  T(F): 99.9 (08 Apr 2019 03:12), Max: 102.4 (07 Apr 2019 22:03)  HR: 92 (08 Apr 2019 03:12) (92 - 108)  BP: 108/67 (08 Apr 2019 03:12) (108/67 - 164/88)  BP(mean): --  RR: 20 (08 Apr 2019 03:12) (18 - 20)  SpO2: 100% (08 Apr 2019 03:12) (95% - 100%)    ID f/u in AM  continue current regimen     will continue to monitor   will endorse to day team     Bhargavi Soto PA-C   [id number] WHITE, MYRIAM  68y Female    Notified by RN T 102.4 at 2200. Patient seen and examined.  at bedside. Nonverbal. NAD.      Vital Signs   T 102.4     RR 18  / 73   02 Sat 95%       Physical Exam:   Constitutional: NAD.  Neuro:  Grossly intact   Cardiovascular: +S1 S2. RRR.  No murmurs.  No LE edema.  Respiratory:  Even, unlabored.  Clear lungs bilaterally. No wheezing, rhonchi, or crackles.  Gastrointestinal: +BS X4 active. Soft. Nontender. Nondistended, peg site C/D/I   Extremities/Vascular: +2 pulses bilaterally.   Skin:  warm, dry, PICC site C/D/I       < from: Xray Chest 1 View- PORTABLE-Urgent (04.05.19 @ 17:47) >    IMPRESSION:   Since 3/31/19 exam, new right basilar opacity, as above.  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 History was provided by patient's daughters, Tere and Angie at bedside. Patient has been having persistent fever at home up to 103F. She has appeared more lethargic for the last 2 days and has had labored breathing, but no cough. She has loose nonbloody stools. No recent emesis, melena or hematuria. Urine output via gavin catheter is normal yellow appearance and had "good output." Patient gets regular wound care but has persistent ulcers. Because of persistent fevers, pt was brought to ED. This patient was previously hospitalized from 2/26-3/18/2019 for fevers, found to have septic shock 2/2 UTI vs sacral ulcer vs PNA, requiring pressor support and MICU stay. 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 Offer was made to change the spacer and get cultures, however at that time, the  did not want to put the patient through surgery. Pt completed a course of meropenem, and was resumed on suppressive minocycline. (24 Mar 2019 20:54)    Persistent fevers   -Tylenol  -blood cxs  -UA UCx   -continue vancomycin   -CBC - WBC 21.2  -lactate - 3.7 - downtrending - NS bolus, f/u AM lactate   -f/u ID in AM     will continue to monitor   will endorse to day team     Bhargavi Soto PA-C  [id number]    Addendum:   Notified by RN  concerned about breathing at 1AM. Patient seen and examined. Lungs CTAB, no acute respiratory distress noted.  states breathing gotten better. No acute changes clinically from previous exam.     vitals   T 101.6   HR 96   /68   RR 23   02 Sat 96%     continue NC 02   ABG   temp downtrending - cooling blanket ordered   opacity seen on CXR - ?aspiration - meropenem x1 as per Dr. Gonzalez (ID) (allergy to PCN)   ID f/u in AM     will continue to monitor   will endorse to day team     Bhargavi Soto PA-C   [id number]    Addendum:   Patient seen and examined at bedside.  at bedside. RR = 20. No acute respiratory distress noted. Resting in bed comfortably. No acute changes from above.     Vital Signs Last 24 Hrs  T(C): 37.7 (08 Apr 2019 03:12), Max: 39.1 (07 Apr 2019 22:03)  T(F): 99.9 (08 Apr 2019 03:12), Max: 102.4 (07 Apr 2019 22:03)  HR: 92 (08 Apr 2019 03:12) (92 - 108)  BP: 108/67 (08 Apr 2019 03:12) (108/67 - 164/88)  BP(mean): --  RR: 20 (08 Apr 2019 03:12) (18 - 20)  SpO2: 100% (08 Apr 2019 03:12) (95% - 100%)    ID f/u in AM  continue current regimen     will continue to monitor   will endorse to day team     Bhargavi Soto PA-C   [id number]    Addendum:   Noted ABG results. No respiratory distress noted. VSS.     ABG - ( 08 Apr 2019 01:48 )  pH, Arterial: 7.55  pH, Blood: x     /  pCO2: 35    /  pO2: 131   / HCO3: 30    / Base Excess: 7.5   /  SaO2: 100       repeat ABG in AM   can consider renal consult pending repeat ABG results     discussed above w/ Dr. Bolden     will continue to monitor   will endorse to day team     Bhargavi Soto PA-C  [id number]

## 2019-04-09 LAB
ANION GAP SERPL CALC-SCNC: 12 MMOL/L — SIGNIFICANT CHANGE UP (ref 5–17)
BUN SERPL-MCNC: 38 MG/DL — HIGH (ref 7–23)
CALCIUM SERPL-MCNC: 8.1 MG/DL — LOW (ref 8.4–10.5)
CHLORIDE SERPL-SCNC: 102 MMOL/L — SIGNIFICANT CHANGE UP (ref 96–108)
CO2 SERPL-SCNC: 30 MMOL/L — SIGNIFICANT CHANGE UP (ref 22–31)
CREAT SERPL-MCNC: <0.3 MG/DL — LOW (ref 0.5–1.3)
CULTURE RESULTS: SIGNIFICANT CHANGE UP
GLUCOSE SERPL-MCNC: 228 MG/DL — HIGH (ref 70–99)
HCT VFR BLD CALC: 28.8 % — LOW (ref 34.5–45)
HGB BLD-MCNC: 9.6 G/DL — LOW (ref 11.5–15.5)
MCHC RBC-ENTMCNC: 31.5 PG — SIGNIFICANT CHANGE UP (ref 27–34)
MCHC RBC-ENTMCNC: 33.2 GM/DL — SIGNIFICANT CHANGE UP (ref 32–36)
MCV RBC AUTO: 94.8 FL — SIGNIFICANT CHANGE UP (ref 80–100)
PLATELET # BLD AUTO: 394 K/UL — SIGNIFICANT CHANGE UP (ref 150–400)
POTASSIUM SERPL-MCNC: 3.3 MMOL/L — LOW (ref 3.5–5.3)
POTASSIUM SERPL-SCNC: 3.3 MMOL/L — LOW (ref 3.5–5.3)
RBC # BLD: 3.04 M/UL — LOW (ref 3.8–5.2)
RBC # FLD: 17.7 % — HIGH (ref 10.3–14.5)
SODIUM SERPL-SCNC: 144 MMOL/L — SIGNIFICANT CHANGE UP (ref 135–145)
SPECIMEN SOURCE: SIGNIFICANT CHANGE UP
WBC # BLD: 23.2 K/UL — HIGH (ref 3.8–10.5)
WBC # FLD AUTO: 23.2 K/UL — HIGH (ref 3.8–10.5)

## 2019-04-09 PROCEDURE — 99232 SBSQ HOSP IP/OBS MODERATE 35: CPT

## 2019-04-09 RX ORDER — POTASSIUM CHLORIDE 20 MEQ
20 PACKET (EA) ORAL EVERY 4 HOURS
Qty: 0 | Refills: 0 | Status: COMPLETED | OUTPATIENT
Start: 2019-04-09 | End: 2019-04-09

## 2019-04-09 RX ADMIN — Medication 25 MILLIGRAM(S): at 08:36

## 2019-04-09 RX ADMIN — Medication 100 MILLIGRAM(S): at 22:14

## 2019-04-09 RX ADMIN — Medication 3 MILLILITER(S): at 13:13

## 2019-04-09 RX ADMIN — Medication 12.5 MILLIGRAM(S): at 08:40

## 2019-04-09 RX ADMIN — Medication 650 MILLIGRAM(S): at 11:00

## 2019-04-09 RX ADMIN — GABAPENTIN 300 MILLIGRAM(S): 400 CAPSULE ORAL at 08:36

## 2019-04-09 RX ADMIN — Medication 12.5 MILLIGRAM(S): at 22:13

## 2019-04-09 RX ADMIN — Medication 650 MILLIGRAM(S): at 11:30

## 2019-04-09 RX ADMIN — APIXABAN 5 MILLIGRAM(S): 2.5 TABLET, FILM COATED ORAL at 06:23

## 2019-04-09 RX ADMIN — Medication 100 MILLIGRAM(S): at 13:14

## 2019-04-09 RX ADMIN — Medication 1 TABLET(S): at 08:35

## 2019-04-09 RX ADMIN — LISINOPRIL 5 MILLIGRAM(S): 2.5 TABLET ORAL at 22:13

## 2019-04-09 RX ADMIN — QUETIAPINE FUMARATE 25 MILLIGRAM(S): 200 TABLET, FILM COATED ORAL at 22:13

## 2019-04-09 RX ADMIN — BUDESONIDE AND FORMOTEROL FUMARATE DIHYDRATE 2 PUFF(S): 160; 4.5 AEROSOL RESPIRATORY (INHALATION) at 08:35

## 2019-04-09 RX ADMIN — APIXABAN 5 MILLIGRAM(S): 2.5 TABLET, FILM COATED ORAL at 18:55

## 2019-04-09 RX ADMIN — TIZANIDINE 4 MILLIGRAM(S): 4 TABLET ORAL at 22:13

## 2019-04-09 RX ADMIN — Medication 250 MILLIGRAM(S): at 10:52

## 2019-04-09 RX ADMIN — Medication 1.5 MILLIGRAM(S): at 22:13

## 2019-04-09 RX ADMIN — Medication 1 APPLICATION(S): at 13:15

## 2019-04-09 RX ADMIN — Medication 3 MILLILITER(S): at 18:55

## 2019-04-09 RX ADMIN — BUDESONIDE AND FORMOTEROL FUMARATE DIHYDRATE 2 PUFF(S): 160; 4.5 AEROSOL RESPIRATORY (INHALATION) at 22:14

## 2019-04-09 RX ADMIN — Medication 3 MILLILITER(S): at 06:23

## 2019-04-09 RX ADMIN — Medication 1 APPLICATION(S): at 06:18

## 2019-04-09 RX ADMIN — Medication 300 MILLIGRAM(S): at 08:36

## 2019-04-09 RX ADMIN — Medication 20 MILLIEQUIVALENT(S): at 18:55

## 2019-04-09 RX ADMIN — FAMOTIDINE 20 MILLIGRAM(S): 10 INJECTION INTRAVENOUS at 08:36

## 2019-04-09 RX ADMIN — TIZANIDINE 4 MILLIGRAM(S): 4 TABLET ORAL at 08:36

## 2019-04-09 RX ADMIN — Medication 3 MILLILITER(S): at 23:52

## 2019-04-09 RX ADMIN — Medication 3 MILLILITER(S): at 00:39

## 2019-04-09 RX ADMIN — Medication 100 MILLIGRAM(S): at 06:22

## 2019-04-09 RX ADMIN — Medication 20 MILLIEQUIVALENT(S): at 13:14

## 2019-04-09 RX ADMIN — GABAPENTIN 300 MILLIGRAM(S): 400 CAPSULE ORAL at 22:14

## 2019-04-09 RX ADMIN — Medication 1 APPLICATION(S): at 22:02

## 2019-04-09 RX ADMIN — Medication 500 MILLIGRAM(S): at 08:35

## 2019-04-09 NOTE — CHART NOTE - NSCHARTNOTEFT_GEN_A_CORE
Rectal tube was placed on 4/9/2019 by BRET Meadows and BENJI Escobedo. Patient was placed in the LLD position for the insertion. Mrs. Colon tolerated the insertion well and was in no distress. Patient was repositioned and comfortably resting after the insertion was completed.    Tricia Meadows PA-C  [phone]

## 2019-04-09 NOTE — PROGRESS NOTE ADULT - ASSESSMENT
68 yr old with advanced dementia , bed bound, contracted, cva, sp removal of infected hip due to mrsa with placement of space last fall.   Pt has been havening fevers for months.  I suspect there is ongoing Om of the hip site but changing the spacer is not a realistic option . chronic Om of the sacrum usually does not cause fevers and does not need prolonged ab therapy .       cultures negative to date  uc with yeast of doubtful significance    vanco to cover mrsa  can dc meropenem  after cultures , we cac decide on endpoint of ab therapy   discussed  with      reluctant to rescan   does not want mri  temp is down on vanco    if it stays down , we will need to place picc and give her a course  of vanco even though it will  not cure a chronic hip infection         no good options  discussed with   pathologic fracture noted due to contractures and osteoporosis      plan several weeks of vanco realizing she may still have fever.   Discussed with  in detail  .

## 2019-04-09 NOTE — PROGRESS NOTE ADULT - SUBJECTIVE AND OBJECTIVE BOX
---___---___---___---___---___---___ ---___---___---___---___---___---___---___---___---___---                    <<<  M E D I C A L   A T T E N D I N G    F O L L O W    U P   N O T E  >>>    still on cooling blanket .    ---___---___---___---___---___---      <<<  MEDICATIONS:  >>>    MEDICATIONS  (STANDING):  ALBUTerol    90 MICROgram(s) HFA Inhaler 1 Puff(s) Inhalation every 4 hours  ALBUTerol/ipratropium for Nebulization 3 milliLiter(s) Nebulizer every 6 hours  apixaban 5 milliGRAM(s) Oral every 12 hours  ascorbic acid 500 milliGRAM(s) Oral daily  buDESOnide 160 MICROgram(s)/formoterol 4.5 MICROgram(s) Inhaler 2 Puff(s) Inhalation two times a day  clonazePAM Tablet 1.5 milliGRAM(s) Oral <User Schedule>  ergocalciferol 73183 Unit(s) Oral every week  famotidine    Tablet 20 milliGRAM(s) Oral daily  ferrous    sulfate Liquid 300 milliGRAM(s) Enteral Tube daily  gabapentin   Solution 300 milliGRAM(s) Oral two times a day  hydrocortisone sodium succinate Injectable 100 milliGRAM(s) IV Push every 8 hours  lisinopril 5 milliGRAM(s) Enteral Tube at bedtime  Medical Marijuana Oil 1 milliLiter(s),Medical Marijuana Oil 1 milliLiter(s) 1 milliLiter(s) Oral <User Schedule>  metoprolol tartrate 12.5 milliGRAM(s) Enteral Tube two times a day  midodrine 10 milliGRAM(s) Oral every 8 hours  multivitamin 1 Tablet(s) Oral daily  petrolatum white Ointment 1 Application(s) Topical three times a day  QUEtiapine 25 milliGRAM(s) Oral at bedtime  sodium chloride 0.9% Bolus 500 milliLiter(s) IV Bolus once  tiotropium 18 MICROgram(s) Capsule 1 Capsule(s) Inhalation daily  tiZANidine 4 milliGRAM(s) Oral <User Schedule>  vancomycin  IVPB 1000 milliGRAM(s) IV Intermittent every 24 hours      MEDICATIONS  (PRN):  acetaminophen    Suspension .. 650 milliGRAM(s) Oral every 6 hours PRN Temp greater or equal to 38C (100.4F), Mild Pain (1 - 3), Moderate Pain (4 - 6)  diphenhydrAMINE   Elixir 25 milliGRAM(s) Oral every 6 hours PRN Rash and/or Itching  nystatin Powder 1 Application(s) Topical two times a day PRN rash/itchiness  oxyCODONE    IR 5 milliGRAM(s) Oral every 6 hours PRN Moderate Pain (4 - 6)/Severe Pain (7 - 10)  sodium chloride 0.65% Nasal 1 Spray(s) Both Nostrils three times a day PRN Nasal Congestion       ---___---___---___---___---___---     <<<REVIEW OF SYSTEM: >>>    unable to obtain   ---___---___---___---___---___---          <<<  VITAL SIGNS: >>>    T(F): 99.2 (19 @ 05:44), Max: 102.2 (19 @ 17:01)  HR: 94 (19 @ 05:44) (94 - 99)  BP: 138/75 (19 @ 05:44) (102/56 - 140/64)  RR: 18 (19 @ 05:44) (18 - 18)  SpO2: 97% (19 @ 05:44) (96% - 99%)  Wt(kg): --  CAPILLARY BLOOD GLUCOSE        I&O's Summary    2019 07:01  -  2019 07:00  --------------------------------------------------------  IN: 1236 mL / OUT: 900 mL / NET: 336 mL         ---___---___---___---___---___---                       PHYSICAL EXAM:    GEN: A&O X 0 , NAD , comfortable  HEENT: NCAT, PERRL, MMM, no scleral icterus, hearing intact  NECK: Supple, No JVD  CVS: S1S2 , regular , No M/R/G appreciated  PULM: CTA B/L,  no W/R/R appreciated  ABD.: soft. non tender, non distended,  bowel sounds present peg noted   Extrem: intact pulses , no edema noted left leg in brace   Derm: stage 4 sacral decubitus ulcer noted         ---___---___---___---___---___---     <<<  LAB AND IMAGING: >>>                          8.7    17.1  )-----------( 324      ( 2019 06:05 )             26.3               04-    142  |  99  |  37<H>  ----------------------------<  242<H>  3.0<L>   |  30  |  <0.30<L>    Ca    7.7<L>      2019 06:05  Mg     1.9     -08             Urinalysis Basic - ( 2019 20:07 )    Color: Yellow / Appearance: Clear / S.031 / pH: x  Gluc: x / Ketone: Negative  / Bili: Negative / Urobili: Negative   Blood: x / Protein: 30 mg/dL / Nitrite: Negative   Leuk Esterase: Large / RBC: 18 /hpf / WBC 57 /HPF   Sq Epi: x / Non Sq Epi: 1 /hpf / Bacteria: Negative                ABG - ( 2019 01:48 )  pH, Arterial: 7.55  pH, Blood: x     /  pCO2: 35    /  pO2: 131   / HCO3: 30    / Base Excess: 7.5   /  SaO2: 100                     [All pertinent / recent available Imaging reports and other labs reviewed]     ---___---___---___---___---___---___ ---___---___---___---___---           <<<  A S S E S S M E N T   A N D   P L A N :  >>>  sepsis  spoke to dr carrizales continue iv abx consider dc home. fevers will never go away   chronic mrsa infection  as above   sacral decubitus  contiue wound care . discussed rectal tube with  kendra roque. he agrees to inserting a rectal tube to maintain hygiene    fractured left leg  pain managment and in brace . will be in chronic pain   h/o dvt  on eliquis  chronic pain  on medical marijuana  asthma on budesonide and prednisone   dementia on seroqul and remeron   agitation as above         -GI/DVT Prophylaxis.    --------------------------------------------  Case discussed with   Education given on     >>______________________<<      Deniz Bolden .         phone   [phone]

## 2019-04-09 NOTE — PROGRESS NOTE ADULT - SUBJECTIVE AND OBJECTIVE BOX
infectious diseases progress note:    Patient is a 68y old  Female who presents with a chief complaint of fevers (2019 07:38)        Fever             Allergies    ASA; dye contrast (Anaphylaxis)  aspirin (Short breath)  divalproex sodium (Other (Unknown))  Haldol (Other (Unknown))  penicillin (Short breath; Rash)  sulfa drugs (Short breath; Rash)  vancomycin (Rash; Urticaria; Hives)  Xanax (Other (Unknown))    Intolerances        ANTIBIOTICS/RELEVANT:  antimicrobials  vancomycin  IVPB 1000 milliGRAM(s) IV Intermittent every 24 hours    immunologic:    OTHER:  acetaminophen    Suspension .. 650 milliGRAM(s) Oral every 6 hours PRN  ALBUTerol    90 MICROgram(s) HFA Inhaler 1 Puff(s) Inhalation every 4 hours  ALBUTerol/ipratropium for Nebulization 3 milliLiter(s) Nebulizer every 6 hours  apixaban 5 milliGRAM(s) Oral every 12 hours  ascorbic acid 500 milliGRAM(s) Oral daily  buDESOnide 160 MICROgram(s)/formoterol 4.5 MICROgram(s) Inhaler 2 Puff(s) Inhalation two times a day  clonazePAM Tablet 1.5 milliGRAM(s) Oral <User Schedule>  diphenhydrAMINE   Elixir 25 milliGRAM(s) Oral every 6 hours PRN  ergocalciferol 89261 Unit(s) Oral every week  famotidine    Tablet 20 milliGRAM(s) Oral daily  ferrous    sulfate Liquid 300 milliGRAM(s) Enteral Tube daily  gabapentin   Solution 300 milliGRAM(s) Oral two times a day  hydrocortisone sodium succinate Injectable 100 milliGRAM(s) IV Push every 8 hours  lisinopril 5 milliGRAM(s) Enteral Tube at bedtime  Medical Marijuana Oil 1 milliLiter(s),Medical Marijuana Oil 1 milliLiter(s) 1 milliLiter(s) Oral <User Schedule>  metoprolol tartrate 12.5 milliGRAM(s) Enteral Tube two times a day  midodrine 10 milliGRAM(s) Oral every 8 hours  multivitamin 1 Tablet(s) Oral daily  nystatin Powder 1 Application(s) Topical two times a day PRN  oxyCODONE    IR 5 milliGRAM(s) Oral every 6 hours PRN  petrolatum white Ointment 1 Application(s) Topical three times a day  QUEtiapine 25 milliGRAM(s) Oral at bedtime  sodium chloride 0.65% Nasal 1 Spray(s) Both Nostrils three times a day PRN  sodium chloride 0.9% Bolus 500 milliLiter(s) IV Bolus once  tiotropium 18 MICROgram(s) Capsule 1 Capsule(s) Inhalation daily  tiZANidine 4 milliGRAM(s) Oral <User Schedule>      Objective:  Vital Signs Last 24 Hrs  T(C): 37.3 (2019 05:44), Max: 39 (2019 17:01)  T(F): 99.2 (2019 05:44), Max: 102.2 (2019 17:01)  HR: 94 (2019 05:44) (94 - 99)  BP: 138/75 (2019 05:44) (102/56 - 140/64)  BP(mean): --  RR: 18 (2019 05:44) (18 - 18)  SpO2: 97% (2019 05:44) (96% - 99%)    PHYSICAL EXAM:     Ear/Nose/Throat: no oral lesion, no sinus tenderness on percussion	  Neck:no JVD, no lymphadenopathy, supple  Respiratory: CTA maryjane  Cardiovascular: S1S2 RRR, no murmurs  Gastrointestinal:soft, (+) BS, no HSM  Extremities:no e/e/c        LABS:                        8.7    17.1  )-----------( 324      ( 2019 06:05 )             26.3     04    142  |  99  |  37<H>  ----------------------------<  242<H>  3.0<L>   |  30  |  <0.30<L>    Ca    7.7<L>      2019 06:05  Mg     1.9             Urinalysis Basic - ( 2019 20:07 )    Color: Yellow / Appearance: Clear / S.031 / pH: x  Gluc: x / Ketone: Negative  / Bili: Negative / Urobili: Negative   Blood: x / Protein: 30 mg/dL / Nitrite: Negative   Leuk Esterase: Large / RBC: 18 /hpf / WBC 57 /HPF   Sq Epi: x / Non Sq Epi: 1 /hpf / Bacteria: Negative          MICROBIOLOGY:    RECENT CULTURES:   @ 09:01 .Urine Catheterized                <10,000 CFU/mL Normal Urogenital Olive     @ 05:58 .Blood Blood-Peripheral                No growth to date.     @ 18:29 .Urine Catheterized                No growth     @ 16:38 .Blood Blood-Peripheral                No growth at 5 days.          RESPIRATORY CULTURES:              RADIOLOGY & ADDITIONAL STUDIES:        Pager [phone]  After 5 pm/weekends or if no response :[phone]

## 2019-04-10 LAB
ANION GAP SERPL CALC-SCNC: 13 MMOL/L — SIGNIFICANT CHANGE UP (ref 5–17)
BUN SERPL-MCNC: 34 MG/DL — HIGH (ref 7–23)
CALCIUM SERPL-MCNC: 8 MG/DL — LOW (ref 8.4–10.5)
CHLORIDE SERPL-SCNC: 101 MMOL/L — SIGNIFICANT CHANGE UP (ref 96–108)
CO2 SERPL-SCNC: 29 MMOL/L — SIGNIFICANT CHANGE UP (ref 22–31)
CREAT SERPL-MCNC: <0.3 MG/DL — LOW (ref 0.5–1.3)
GLUCOSE SERPL-MCNC: 280 MG/DL — HIGH (ref 70–99)
HCT VFR BLD CALC: 29.4 % — LOW (ref 34.5–45)
HGB BLD-MCNC: 9.7 G/DL — LOW (ref 11.5–15.5)
MAGNESIUM SERPL-MCNC: 2 MG/DL — SIGNIFICANT CHANGE UP (ref 1.6–2.6)
MCHC RBC-ENTMCNC: 31.6 PG — SIGNIFICANT CHANGE UP (ref 27–34)
MCHC RBC-ENTMCNC: 32.9 GM/DL — SIGNIFICANT CHANGE UP (ref 32–36)
MCV RBC AUTO: 96.1 FL — SIGNIFICANT CHANGE UP (ref 80–100)
PLATELET # BLD AUTO: 315 K/UL — SIGNIFICANT CHANGE UP (ref 150–400)
POTASSIUM SERPL-MCNC: 4 MMOL/L — SIGNIFICANT CHANGE UP (ref 3.5–5.3)
POTASSIUM SERPL-SCNC: 4 MMOL/L — SIGNIFICANT CHANGE UP (ref 3.5–5.3)
RBC # BLD: 3.06 M/UL — LOW (ref 3.8–5.2)
RBC # FLD: 17.7 % — HIGH (ref 10.3–14.5)
SODIUM SERPL-SCNC: 143 MMOL/L — SIGNIFICANT CHANGE UP (ref 135–145)
VANCOMYCIN TROUGH SERPL-MCNC: 9.8 UG/ML — LOW (ref 10–20)
WBC # BLD: 18 K/UL — HIGH (ref 3.8–10.5)
WBC # FLD AUTO: 18 K/UL — HIGH (ref 3.8–10.5)

## 2019-04-10 PROCEDURE — 99232 SBSQ HOSP IP/OBS MODERATE 35: CPT

## 2019-04-10 RX ADMIN — Medication 100 MILLIGRAM(S): at 14:57

## 2019-04-10 RX ADMIN — Medication 3 MILLILITER(S): at 14:58

## 2019-04-10 RX ADMIN — TIZANIDINE 4 MILLIGRAM(S): 4 TABLET ORAL at 10:38

## 2019-04-10 RX ADMIN — GABAPENTIN 300 MILLIGRAM(S): 400 CAPSULE ORAL at 10:48

## 2019-04-10 RX ADMIN — MIDODRINE HYDROCHLORIDE 10 MILLIGRAM(S): 2.5 TABLET ORAL at 22:16

## 2019-04-10 RX ADMIN — Medication 300 MILLIGRAM(S): at 10:42

## 2019-04-10 RX ADMIN — Medication 3 MILLILITER(S): at 23:18

## 2019-04-10 RX ADMIN — Medication 1 TABLET(S): at 10:45

## 2019-04-10 RX ADMIN — QUETIAPINE FUMARATE 25 MILLIGRAM(S): 200 TABLET, FILM COATED ORAL at 22:16

## 2019-04-10 RX ADMIN — Medication 1 APPLICATION(S): at 10:43

## 2019-04-10 RX ADMIN — Medication 1.5 MILLIGRAM(S): at 22:16

## 2019-04-10 RX ADMIN — Medication 1 APPLICATION(S): at 14:59

## 2019-04-10 RX ADMIN — Medication 250 MILLIGRAM(S): at 14:57

## 2019-04-10 RX ADMIN — GABAPENTIN 300 MILLIGRAM(S): 400 CAPSULE ORAL at 21:13

## 2019-04-10 RX ADMIN — Medication 12.5 MILLIGRAM(S): at 21:13

## 2019-04-10 RX ADMIN — Medication 3 MILLILITER(S): at 05:46

## 2019-04-10 RX ADMIN — BUDESONIDE AND FORMOTEROL FUMARATE DIHYDRATE 2 PUFF(S): 160; 4.5 AEROSOL RESPIRATORY (INHALATION) at 11:00

## 2019-04-10 RX ADMIN — Medication 12.5 MILLIGRAM(S): at 10:37

## 2019-04-10 RX ADMIN — MIDODRINE HYDROCHLORIDE 10 MILLIGRAM(S): 2.5 TABLET ORAL at 14:57

## 2019-04-10 RX ADMIN — MIDODRINE HYDROCHLORIDE 10 MILLIGRAM(S): 2.5 TABLET ORAL at 05:39

## 2019-04-10 RX ADMIN — APIXABAN 5 MILLIGRAM(S): 2.5 TABLET, FILM COATED ORAL at 05:39

## 2019-04-10 RX ADMIN — Medication 100 MILLIGRAM(S): at 05:43

## 2019-04-10 RX ADMIN — Medication 100 MILLIGRAM(S): at 22:16

## 2019-04-10 RX ADMIN — BUDESONIDE AND FORMOTEROL FUMARATE DIHYDRATE 2 PUFF(S): 160; 4.5 AEROSOL RESPIRATORY (INHALATION) at 21:13

## 2019-04-10 RX ADMIN — Medication 500 MILLIGRAM(S): at 10:40

## 2019-04-10 RX ADMIN — TIZANIDINE 4 MILLIGRAM(S): 4 TABLET ORAL at 22:16

## 2019-04-10 RX ADMIN — FAMOTIDINE 20 MILLIGRAM(S): 10 INJECTION INTRAVENOUS at 10:41

## 2019-04-10 RX ADMIN — LISINOPRIL 5 MILLIGRAM(S): 2.5 TABLET ORAL at 22:16

## 2019-04-10 RX ADMIN — Medication 25 MILLIGRAM(S): at 16:04

## 2019-04-10 RX ADMIN — APIXABAN 5 MILLIGRAM(S): 2.5 TABLET, FILM COATED ORAL at 18:49

## 2019-04-10 RX ADMIN — Medication 3 MILLILITER(S): at 18:49

## 2019-04-10 RX ADMIN — Medication 1 APPLICATION(S): at 23:21

## 2019-04-10 NOTE — PROGRESS NOTE ADULT - SUBJECTIVE AND OBJECTIVE BOX
infectious diseases progress note:    Patient is a 68y old  Female who presents with a chief complaint of fevers (2019 08:07)        Fever        R     Allergies    ASA; dye contrast (Anaphylaxis)  aspirin (Short breath)  divalproex sodium (Other (Unknown))  Flowers and Plants (Short breath)  Haldol (Other (Unknown))  penicillin (Short breath; Rash)  sulfa drugs (Short breath; Rash)  vancomycin (Rash; Urticaria; Hives)  Xanax (Other (Unknown))    Intolerances        ANTIBIOTICS/RELEVANT:  antimicrobials  vancomycin  IVPB 1000 milliGRAM(s) IV Intermittent every 24 hours    immunologic:    OTHER:  acetaminophen    Suspension .. 650 milliGRAM(s) Oral every 6 hours PRN  ALBUTerol    90 MICROgram(s) HFA Inhaler 1 Puff(s) Inhalation every 4 hours  ALBUTerol/ipratropium for Nebulization 3 milliLiter(s) Nebulizer every 6 hours  apixaban 5 milliGRAM(s) Oral every 12 hours  ascorbic acid 500 milliGRAM(s) Oral daily  buDESOnide 160 MICROgram(s)/formoterol 4.5 MICROgram(s) Inhaler 2 Puff(s) Inhalation two times a day  clonazePAM Tablet 1.5 milliGRAM(s) Oral <User Schedule>  diphenhydrAMINE   Elixir 25 milliGRAM(s) Oral every 6 hours PRN  ergocalciferol 43114 Unit(s) Oral every week  famotidine    Tablet 20 milliGRAM(s) Oral daily  ferrous    sulfate Liquid 300 milliGRAM(s) Enteral Tube daily  gabapentin   Solution 300 milliGRAM(s) Oral two times a day  hydrocortisone sodium succinate Injectable 100 milliGRAM(s) IV Push every 8 hours  lisinopril 5 milliGRAM(s) Enteral Tube at bedtime  Medical Marijuana Oil 1 milliLiter(s),Medical Marijuana Oil 1 milliLiter(s) 1 milliLiter(s) Oral <User Schedule>  metoprolol tartrate 12.5 milliGRAM(s) Enteral Tube two times a day  midodrine 10 milliGRAM(s) Oral every 8 hours  multivitamin 1 Tablet(s) Oral daily  nystatin Powder 1 Application(s) Topical two times a day PRN  oxyCODONE    IR 5 milliGRAM(s) Oral every 6 hours PRN  petrolatum white Ointment 1 Application(s) Topical three times a day  QUEtiapine 25 milliGRAM(s) Oral at bedtime  sodium chloride 0.65% Nasal 1 Spray(s) Both Nostrils three times a day PRN  sodium chloride 0.9% Bolus 500 milliLiter(s) IV Bolus once  tiotropium 18 MICROgram(s) Capsule 1 Capsule(s) Inhalation daily  tiZANidine 4 milliGRAM(s) Oral <User Schedule>      Objective:  Vital Signs Last 24 Hrs  T(C): 37.2 (10 Apr 2019 00:01), Max: 38.7 (2019 11:01)  T(F): 99 (10 Apr 2019 00:01), Max: 101.7 (2019 11:01)  HR: 87 (10 Apr 2019 00:01) (83 - 94)  BP: 100/58 (10 Apr 2019 00:01) (100/58 - 160/77)  BP(mean): --  RR: 18 (2019 21:42) (18 - 18)  SpO2: 100% (10 Apr 2019 00:01) (96% - 100%)    PHYSICAL EXAM:     Eyes:JACQUELIN, EOMI  Ear/Nose/Throat: no oral lesion, no sinus tenderness on percussion	  Neck:no JVD, no lymphadenopathy, supple  R   Cardiovascular: S1S2 RRR, no murmurs  Gastrointestinal:soft, (+) BS, no HSM  Extremities:no e/e/c        LABS:                        9.7    18.0  )-----------( 315      ( 10 Apr 2019 05:41 )             29.4     04-10    143  |  101  |  34<H>  ----------------------------<  280<H>  4.0   |  29  |  <0.30<L>    Ca    8.0<L>      10 Apr 2019 05:41  Mg     2.0     04-10        Urinalysis Basic - ( 2019 20:07 )    Color: Yellow / Appearance: Clear / S.031 / pH: x  Gluc: x / Ketone: Negative  / Bili: Negative / Urobili: Negative   Blood: x / Protein: 30 mg/dL / Nitrite: Negative   Leuk Esterase: Large / RBC: 18 /hpf / WBC 57 /HPF   Sq Epi: x / Non Sq Epi: 1 /hpf / Bacteria: Negative          MICROBIOLOGY:    RECENT CULTURES:   @ 09:01 .Urine Catheterized                <10,000 CFU/mL Normal Urogenital Olive     @ 05:58 .Blood Blood-Peripheral                No growth to date.     @ 18:29 .Urine Catheterized                No growth     @ 16:38 .Blood Blood-Peripheral                No growth at 5 days.          RESPIRATORY CULTURES:              RADIOLOGY & ADDITIONAL STUDIES:        Pager [phone]  After 5 pm/weekends or if no response :[phone]

## 2019-04-10 NOTE — CHART NOTE - NSCHARTNOTEFT_GEN_A_CORE
Nutrition Follow Up Note  Patient seen for: follow up  · Reason for Admission	fevers    68 yr old with advanced dementia , bed bound, contracted, cva, sp removal of infected hip due to mrsa with placement of space last fall.   Pt has been havening fevers for months        Source: , chart, NP    Diet : ENTERAL,NPO/ENTERAL  Glucerna 1.5 (4 cans daily)   PROSOURCE TF X 2 DAILY  DANACTIVE X 2 DAILY    Patient reports: unable to speak, In the past  report that  pt received 2 packs of Banatrol daily which did not improve pt diarrhea. Discussed providing 2 packs daily and if needed provide up to 6 packs to address diarrhea.          Enteral /Parenteral Nutrition:   Glucerna 1.5 (4 cans daily) provides 1424calories/78grams protein/720g/mlwater) 34kcal/kg and 1.8grams protein/kg. based on IBW of 42kg. provide additional 300ml free water daily.      Daily Weight in k.4 (-10), Weight in k.3 (-), Weight in k.3 (-), Weight in k.8 ()  % Weight Change: 21% gain since 3/27    Pertinent Medications: MEDICATIONS  (STANDING):  ALBUTerol    90 MICROgram(s) HFA Inhaler 1 Puff(s) Inhalation every 4 hours  ALBUTerol/ipratropium for Nebulization 3 milliLiter(s) Nebulizer every 6 hours  apixaban 5 milliGRAM(s) Oral every 12 hours  ascorbic acid 500 milliGRAM(s) Oral daily  buDESOnide 160 MICROgram(s)/formoterol 4.5 MICROgram(s) Inhaler 2 Puff(s) Inhalation two times a day  clonazePAM Tablet 1.5 milliGRAM(s) Oral <User Schedule>  ergocalciferol 29511 Unit(s) Oral every week  famotidine    Tablet 20 milliGRAM(s) Oral daily  ferrous    sulfate Liquid 300 milliGRAM(s) Enteral Tube daily  gabapentin   Solution 300 milliGRAM(s) Oral two times a day  hydrocortisone sodium succinate Injectable 100 milliGRAM(s) IV Push every 8 hours  lisinopril 5 milliGRAM(s) Enteral Tube at bedtime  Medical Marijuana Oil 1 milliLiter(s),Medical Marijuana Oil 1 milliLiter(s) 1 milliLiter(s) Oral <User Schedule>  metoprolol tartrate 12.5 milliGRAM(s) Enteral Tube two times a day  midodrine 10 milliGRAM(s) Oral every 8 hours  multivitamin 1 Tablet(s) Oral daily  petrolatum white Ointment 1 Application(s) Topical three times a day  QUEtiapine 25 milliGRAM(s) Oral at bedtime  sodium chloride 0.9% Bolus 500 milliLiter(s) IV Bolus once  tiotropium 18 MICROgram(s) Capsule 1 Capsule(s) Inhalation daily  tiZANidine 4 milliGRAM(s) Oral <User Schedule>  vancomycin  IVPB 1000 milliGRAM(s) IV Intermittent every 24 hours    MEDICATIONS  (PRN):  acetaminophen    Suspension .. 650 milliGRAM(s) Oral every 6 hours PRN Temp greater or equal to 38C (100.4F), Mild Pain (1 - 3), Moderate Pain (4 - 6)  diphenhydrAMINE   Elixir 25 milliGRAM(s) Oral every 6 hours PRN Rash and/or Itching  nystatin Powder 1 Application(s) Topical two times a day PRN rash/itchiness  oxyCODONE    IR 5 milliGRAM(s) Oral every 6 hours PRN Moderate Pain (4 - 6)/Severe Pain (7 - 10)  sodium chloride 0.65% Nasal 1 Spray(s) Both Nostrils three times a day PRN Nasal Congestion    Pertinent Labs: 04-10 @ 05:41: Na 143, BUN 34<H>, Cr <0.30<L>, <H>, K+ 4.0    Finger Sticks:  POCT: 249      Skin per nursing documentation: stage 4 sacrum, stage 3 right hip  Edema: +2 left leg    Estimated Needs:   [ x] no change since previous assessment  [ ] recalculated:     Previous Nutrition Diagnosis:  Increased nutrient needs (specify); calories/protein  Nutrition Diagnosis is: ongoing-being addressed with supplement           Interventions:  placed Banatrol 2 x daily in [facility] and discussed with .       Recommend  1)continue  TF pro source at 8am and 16pm. Danactive at 12pm and at 20pm.  2)continue VitC  3)continue Glucerna 1.5 (4 cans daily) provides 1424calories/78grams protein/720g/mlwater) 34kcal/kg and 1.8grams protein/kg. based on IBW of 42kg. provide additional 300ml free water daily.  4)continue multivitamin  5)monitor diarrhea and if not resolved increase  Banatrol  to a max of 6 packs daily as discussed with NP.     Monitoring and Evaluation:     Continue to monitor Nutritional intake, Tolerance to diet prescription, weights, labs, skin integrity    RD remains available upon request and will follow up per protocol  Anne Marie Morgan MA, RD, CDN #[id number] Nutrition Follow Up Note  Patient seen for: follow up  · Reason for Admission	fevers    68 yr old with advanced dementia , bed bound, contracted, cva, sp removal of infected hip due to mrsa with placement of space last fall.   Pt has been havening fevers for months        Source: , chart, NP    Diet : ENTERAL,NPO/ENTERAL  Glucerna 1.5 (4 cans daily)   PROSOURCE TF X 2 DAILY  DANACTIVE X 2 DAILY    Patient reports: unable to speak, In the past  report that  pt received 2 packs of Banatrol daily which did not improve pt diarrhea. Discussed providing 2 packs daily and if needed provide up to 6 packs to address diarrhea.          Enteral /Parenteral Nutrition:   Glucerna 1.5 (4 cans daily) provides 1424calories/78grams protein/720g/mlwater) 34kcal/kg and 1.8grams protein/kg. based on IBW of 42kg. provide additional 300ml free water daily.      Daily Weight in k.4 (-10), Weight in k.3 (-), Weight in k.3 (-), Weight in k.8 ()  % Weight Change: 21% gain since 3/27    Pertinent Medications: MEDICATIONS  (STANDING):  ALBUTerol    90 MICROgram(s) HFA Inhaler 1 Puff(s) Inhalation every 4 hours  ALBUTerol/ipratropium for Nebulization 3 milliLiter(s) Nebulizer every 6 hours  apixaban 5 milliGRAM(s) Oral every 12 hours  ascorbic acid 500 milliGRAM(s) Oral daily  buDESOnide 160 MICROgram(s)/formoterol 4.5 MICROgram(s) Inhaler 2 Puff(s) Inhalation two times a day  clonazePAM Tablet 1.5 milliGRAM(s) Oral <User Schedule>  ergocalciferol 16562 Unit(s) Oral every week  famotidine    Tablet 20 milliGRAM(s) Oral daily  ferrous    sulfate Liquid 300 milliGRAM(s) Enteral Tube daily  gabapentin   Solution 300 milliGRAM(s) Oral two times a day  hydrocortisone sodium succinate Injectable 100 milliGRAM(s) IV Push every 8 hours  lisinopril 5 milliGRAM(s) Enteral Tube at bedtime  Medical Marijuana Oil 1 milliLiter(s),Medical Marijuana Oil 1 milliLiter(s) 1 milliLiter(s) Oral <User Schedule>  metoprolol tartrate 12.5 milliGRAM(s) Enteral Tube two times a day  midodrine 10 milliGRAM(s) Oral every 8 hours  multivitamin 1 Tablet(s) Oral daily  petrolatum white Ointment 1 Application(s) Topical three times a day  QUEtiapine 25 milliGRAM(s) Oral at bedtime  sodium chloride 0.9% Bolus 500 milliLiter(s) IV Bolus once  tiotropium 18 MICROgram(s) Capsule 1 Capsule(s) Inhalation daily  tiZANidine 4 milliGRAM(s) Oral <User Schedule>  vancomycin  IVPB 1000 milliGRAM(s) IV Intermittent every 24 hours    MEDICATIONS  (PRN):  acetaminophen    Suspension .. 650 milliGRAM(s) Oral every 6 hours PRN Temp greater or equal to 38C (100.4F), Mild Pain (1 - 3), Moderate Pain (4 - 6)  diphenhydrAMINE   Elixir 25 milliGRAM(s) Oral every 6 hours PRN Rash and/or Itching  nystatin Powder 1 Application(s) Topical two times a day PRN rash/itchiness  oxyCODONE    IR 5 milliGRAM(s) Oral every 6 hours PRN Moderate Pain (4 - 6)/Severe Pain (7 - 10)  sodium chloride 0.65% Nasal 1 Spray(s) Both Nostrils three times a day PRN Nasal Congestion    Pertinent Labs: 04-10 @ 05:41: Na 143, BUN 34<H>, Cr <0.30<L>, <H>, K+ 4.0    Finger Sticks:  POCT: 249      Skin per nursing documentation: stage 4 sacrum, stage 3 right hip  Edema: +2 left leg    Estimated Needs:   [ x] no change since previous assessment  [ ] recalculated:     Previous Nutrition Diagnosis:  Increased nutrient needs (specify); calories/protein  Nutrition Diagnosis is: ongoing-being addressed with supplement           Interventions:  placed Banatrol 2 x daily in [facility] and discussed with .       Recommend  1)continue  TF pro source at 8am and 16pm. Danactive at 12pm and at 20pm.  2)continue VitC  3)continue Glucerna 1.5 (4 cans daily) provides 1424calories/78grams protein/720g/mlwater) 34kcal/kg and 1.8grams protein/kg. based on IBW of 42kg. provide additional 300ml free water daily.  4)continue multivitamin  5)bantrol 2 packs daily. monitor diarrhea and if not resolved increase  Banatrol  to a max of 6 packs daily as discussed with NP.   Provider to RN. Banatrol 1 pack daily. Provide at same time of meds to reduce amount of H20. Mix 1/2 pack of Banantrol with 120ml H20, provide via tube feed. One hour later mix other 1/2 pack with 120ml H20 and provide via tube feed.       Monitoring and Evaluation:     Continue to monitor Nutritional intake, Tolerance to diet prescription, weights, labs, skin integrity    RD remains available upon request and will follow up per protocol  Anne Marie Morgan MA, KEKE, CDN #[id number] Nutrition Follow Up Note  Patient seen for: follow up  · Reason for Admission	fevers    68 yr old with advanced dementia , bed bound, contracted, cva, sp removal of infected hip due to mrsa with placement of space last fall.   Pt has been havening fevers for months        Source: , chart, NP    Diet : ENTERAL,NPO/ENTERAL  Glucerna 1.5 (4 cans daily)   PROSOURCE TF X 2 DAILY  DANACTIVE X 2 DAILY    Patient reports: unable to speak, In the past  report that  pt received 2 packs of Banatrol daily which did not improve pt diarrhea. Discussed providing 2 packs daily and if needed provide up to 6 packs to address diarrhea.          Enteral /Parenteral Nutrition:   Glucerna 1.5 (4 cans daily) provides 1424calories/78grams protein/720g/mlwater) 34kcal/kg and 1.8grams protein/kg. based on IBW of 42kg. provide additional 300ml free water daily.      Daily Weight in k.4 (-10), Weight in k.3 (-), Weight in k.3 (-), Weight in k.8 ()  % Weight Change: 21% gain since 3/27    Pertinent Medications: MEDICATIONS  (STANDING):  ALBUTerol    90 MICROgram(s) HFA Inhaler 1 Puff(s) Inhalation every 4 hours  ALBUTerol/ipratropium for Nebulization 3 milliLiter(s) Nebulizer every 6 hours  apixaban 5 milliGRAM(s) Oral every 12 hours  ascorbic acid 500 milliGRAM(s) Oral daily  buDESOnide 160 MICROgram(s)/formoterol 4.5 MICROgram(s) Inhaler 2 Puff(s) Inhalation two times a day  clonazePAM Tablet 1.5 milliGRAM(s) Oral <User Schedule>  ergocalciferol 43464 Unit(s) Oral every week  famotidine    Tablet 20 milliGRAM(s) Oral daily  ferrous    sulfate Liquid 300 milliGRAM(s) Enteral Tube daily  gabapentin   Solution 300 milliGRAM(s) Oral two times a day  hydrocortisone sodium succinate Injectable 100 milliGRAM(s) IV Push every 8 hours  lisinopril 5 milliGRAM(s) Enteral Tube at bedtime  Medical Marijuana Oil 1 milliLiter(s),Medical Marijuana Oil 1 milliLiter(s) 1 milliLiter(s) Oral <User Schedule>  metoprolol tartrate 12.5 milliGRAM(s) Enteral Tube two times a day  midodrine 10 milliGRAM(s) Oral every 8 hours  multivitamin 1 Tablet(s) Oral daily  petrolatum white Ointment 1 Application(s) Topical three times a day  QUEtiapine 25 milliGRAM(s) Oral at bedtime  sodium chloride 0.9% Bolus 500 milliLiter(s) IV Bolus once  tiotropium 18 MICROgram(s) Capsule 1 Capsule(s) Inhalation daily  tiZANidine 4 milliGRAM(s) Oral <User Schedule>  vancomycin  IVPB 1000 milliGRAM(s) IV Intermittent every 24 hours    MEDICATIONS  (PRN):  acetaminophen    Suspension .. 650 milliGRAM(s) Oral every 6 hours PRN Temp greater or equal to 38C (100.4F), Mild Pain (1 - 3), Moderate Pain (4 - 6)  diphenhydrAMINE   Elixir 25 milliGRAM(s) Oral every 6 hours PRN Rash and/or Itching  nystatin Powder 1 Application(s) Topical two times a day PRN rash/itchiness  oxyCODONE    IR 5 milliGRAM(s) Oral every 6 hours PRN Moderate Pain (4 - 6)/Severe Pain (7 - 10)  sodium chloride 0.65% Nasal 1 Spray(s) Both Nostrils three times a day PRN Nasal Congestion    Pertinent Labs: 04-10 @ 05:41: Na 143, BUN 34<H>, Cr <0.30<L>, <H>, K+ 4.0    Finger Sticks:  POCT: 249      Skin per nursing documentation: stage 4 sacrum, stage 3 right hip  Edema: +2 left leg    Estimated Needs:   [ x] no change since previous assessment  [ ] recalculated:     Previous Nutrition Diagnosis:  Increased nutrient needs (specify); calories/protein  Nutrition Diagnosis is: ongoing-being addressed with supplement           Interventions:  placed Banatrol 2 x daily in [facility] and discussed with .       Recommend  1)continue  TF pro source at 8am and 16pm. Danactive at 12pm and at 20pm.  2)continue VitC  3)continue Glucerna 1.5 (4 cans daily) provides 1424calories/78grams protein/720g/mlwater) 34kcal/kg and 1.8grams protein/kg. based on IBW of 42kg. provide additional 300ml free water daily.  4)continue multivitamin  5)bantrol 2 packs daily. monitor diarrhea and if not resolved increase  Banatrol  to a max of 6 packs daily as discussed with NP.   Provider to RN. Banatrol 1 pack daily. Mix 1/2 pack of Banantrol with 120ml H20, provide via tube feed. One hour later mix other 1/2 pack with 120ml H20 and provide via tube feed.   1 pack at 8am and 1 pack at 16pm.      Monitoring and Evaluation:     Continue to monitor Nutritional intake, Tolerance to diet prescription, weights, labs, skin integrity    RD remains available upon request and will follow up per protocol  Anne Marie Morgan MA, KEKE, CDN #[phone]

## 2019-04-10 NOTE — PROGRESS NOTE ADULT - SUBJECTIVE AND OBJECTIVE BOX
---___---___---___---___---___---___ ---___---___---___---___---___---___---___---___---___---                    <<<  M E D I C A L   A T T E N D I N G    F O L L O W    U P   N O T E  >>>    afebrile, no acute changes wbc relatively unchanged.    ---___---___---___---___---___---      <<<  MEDICATIONS:  >>>    MEDICATIONS  (STANDING):  ALBUTerol    90 MICROgram(s) HFA Inhaler 1 Puff(s) Inhalation every 4 hours  ALBUTerol/ipratropium for Nebulization 3 milliLiter(s) Nebulizer every 6 hours  apixaban 5 milliGRAM(s) Oral every 12 hours  artificial tears (preservative free) Ophthalmic Solution 1 Drop(s) Both EYES three times a day  ascorbic acid 500 milliGRAM(s) Oral daily  buDESOnide 160 MICROgram(s)/formoterol 4.5 MICROgram(s) Inhaler 2 Puff(s) Inhalation two times a day  clonazePAM Tablet 1.5 milliGRAM(s) Oral <User Schedule>  ergocalciferol 11059 Unit(s) Oral every week  famotidine    Tablet 20 milliGRAM(s) Oral daily  ferrous    sulfate Liquid 300 milliGRAM(s) Enteral Tube daily  gabapentin   Solution 300 milliGRAM(s) Oral two times a day  hydrocortisone sodium succinate Injectable 100 milliGRAM(s) IV Push every 8 hours  lisinopril 5 milliGRAM(s) Enteral Tube at bedtime  Medical Marijuana Oil 1 milliLiter(s),Medical Marijuana Oil 1 milliLiter(s) 1 milliLiter(s) Oral <User Schedule>  metoprolol tartrate 12.5 milliGRAM(s) Enteral Tube two times a day  midodrine 10 milliGRAM(s) Oral every 8 hours  multivitamin 1 Tablet(s) Oral daily  petrolatum white Ointment 1 Application(s) Topical three times a day  QUEtiapine 25 milliGRAM(s) Oral at bedtime  sodium chloride 0.9% Bolus 500 milliLiter(s) IV Bolus once  tiotropium 18 MICROgram(s) Capsule 1 Capsule(s) Inhalation daily  tiZANidine 4 milliGRAM(s) Oral <User Schedule>  vancomycin  IVPB 1000 milliGRAM(s) IV Intermittent every 24 hours      MEDICATIONS  (PRN):  acetaminophen    Suspension .. 650 milliGRAM(s) Oral every 6 hours PRN Temp greater or equal to 38C (100.4F), Mild Pain (1 - 3), Moderate Pain (4 - 6)  diphenhydrAMINE   Elixir 25 milliGRAM(s) Oral every 6 hours PRN Rash and/or Itching  nystatin Powder 1 Application(s) Topical two times a day PRN rash/itchiness  oxyCODONE    IR 5 milliGRAM(s) Oral every 6 hours PRN Moderate Pain (4 - 6)/Severe Pain (7 - 10)  sodium chloride 0.65% Nasal 1 Spray(s) Both Nostrils three times a day PRN Nasal Congestion       ---___---___---___---___---___---     <<<REVIEW OF SYSTEM: >>>    GEN: no fever, no chills, no pain  RESP: no SOB, no cough, no sputum  CVS: no chest pain, no palpitations, no edema  GI: no abdominal pain, no nausea, no vomiting, no constipation, no diarrhea  : no dysurea, no frequency  NEURO: no headache, no dizziness  PSYCH: no depression, not anxious  Derm : no itching, no rash     ---___---___---___---___---___---          <<<  VITAL SIGNS: >>>    T(F): 97.4 (04-10-19 @ 13:08), Max: 99 (04-10-19 @ 00:01)  HR: 79 (04-10-19 @ 13:08) (79 - 94)  BP: 137/84 (04-10-19 @ 13:08) (100/58 - 137/84)  RR: 18 (04-10-19 @ 13:08) (18 - 18)  SpO2: 100% (04-10-19 @ 00:01) (97% - 100%)  Wt(kg): --  CAPILLARY BLOOD GLUCOSE        I&O's Summary    2019 07:01  -  10 Apr 2019 07:00  --------------------------------------------------------  IN: 270 mL / OUT: 800 mL / NET: -530 mL         ---___---___---___---___---___---                       PHYSICAL EXAM:    GEN: A&O X 3 , NAD , comfortable  HEENT: NCAT, PERRL, MMM, no scleral icterus, hearing intact  NECK: Supple, No JVD  CVS: S1S2 , regular , No M/R/G appreciated  PULM: CTA B/L,  no W/R/R appreciated  ABD.: soft. non tender, non distended,  bowel sounds present peg noted   Extrem: intact pulses , no edema noted left leg in brace   Derm: sacral decubitus noted  PSYCH: normal mood, no depression, not anxious     ---___---___---___---___---___---     <<<  LAB AND IMAGING: >>>                          9.7    18.0  )-----------( 315      ( 10 Apr 2019 05:41 )             29.4               04-10    143  |  101  |  34<H>  ----------------------------<  280<H>  4.0   |  29  |  <0.30<L>    Ca    8.0<L>      10 Apr 2019 05:41  Mg     2.0     04-10             Urinalysis Basic - ( 2019 20:07 )    Color: Yellow / Appearance: Clear / S.031 / pH: x  Gluc: x / Ketone: Negative  / Bili: Negative / Urobili: Negative   Blood: x / Protein: 30 mg/dL / Nitrite: Negative   Leuk Esterase: Large / RBC: 18 /hpf / WBC 57 /HPF   Sq Epi: x / Non Sq Epi: 1 /hpf / Bacteria: Negative                        [All pertinent / recent available Imaging reports and other labs reviewed]     ---___---___---___---___---___---___ ---___---___---___---___---           <<<  A S S E S S M E N T   A N D   P L A N :  >>>  sacral decubitus  needs rectal tube to be placed to maintain hygien for the wound   chronic mrsa infectiopn  on iv abx  will need home vanco  left leg fracture on medical marijuana adn oxycodone   asthma  on budesonide  chf  contineu toprol   dementia  supportive car e  chronic pain as above           -GI/DVT Prophylaxis.    --------------------------------------------  Case discussed with   Education given on     >>______________________<<      Deniz Bolden .         phone   [phone]

## 2019-04-10 NOTE — PROGRESS NOTE ADULT - ASSESSMENT
68 yr old with advanced dementia , bed bound, contracted, cva, sp removal of infected hip due to mrsa with placement of space last fall.   Pt has been havening fevers for months.  I suspect there is ongoing Om of the hip site but changing the spacer is not a realistic option . chronic Om of the sacrum usually does not cause fevers and does not need prolonged ab therapy .       cultures negative to date  uc with yeast of doubtful significance    vanco to cover mrsa  can dc meropenem  after cultures , we cac decide on endpoint of ab therapy   discussed  with      reluctant to rescan   does not want mri  temp is down on vanco    if it stays down ,  give her a course  of vanco even though it will  not cure a chronic hip infection         no good options  discussed with   pathologic fracture noted due to contractures and osteoporosis      plan several weeks of vanco realizing she may still have fever.   Discussed with  in detail  .

## 2019-04-11 LAB
HCT VFR BLD CALC: 19.7 % — CRITICAL LOW (ref 34.5–45)
HCT VFR BLD CALC: 27.2 % — LOW (ref 34.5–45)
HGB BLD-MCNC: 6.5 G/DL — CRITICAL LOW (ref 11.5–15.5)
HGB BLD-MCNC: 9.2 G/DL — LOW (ref 11.5–15.5)
MCHC RBC-ENTMCNC: 31.5 PG — SIGNIFICANT CHANGE UP (ref 27–34)
MCHC RBC-ENTMCNC: 32.2 PG — SIGNIFICANT CHANGE UP (ref 27–34)
MCHC RBC-ENTMCNC: 33 GM/DL — SIGNIFICANT CHANGE UP (ref 32–36)
MCHC RBC-ENTMCNC: 33.7 GM/DL — SIGNIFICANT CHANGE UP (ref 32–36)
MCV RBC AUTO: 95.6 FL — SIGNIFICANT CHANGE UP (ref 80–100)
MCV RBC AUTO: 95.8 FL — SIGNIFICANT CHANGE UP (ref 80–100)
PLATELET # BLD AUTO: 220 K/UL — SIGNIFICANT CHANGE UP (ref 150–400)
PLATELET # BLD AUTO: 286 K/UL — SIGNIFICANT CHANGE UP (ref 150–400)
RBC # BLD: 2.06 M/UL — LOW (ref 3.8–5.2)
RBC # BLD: 2.84 M/UL — LOW (ref 3.8–5.2)
RBC # FLD: 17.9 % — HIGH (ref 10.3–14.5)
RBC # FLD: 18.1 % — HIGH (ref 10.3–14.5)
WBC # BLD: 12.1 K/UL — HIGH (ref 3.8–10.5)
WBC # BLD: 20 K/UL — HIGH (ref 3.8–10.5)
WBC # FLD AUTO: 12.1 K/UL — HIGH (ref 3.8–10.5)
WBC # FLD AUTO: 20 K/UL — HIGH (ref 3.8–10.5)

## 2019-04-11 PROCEDURE — 99232 SBSQ HOSP IP/OBS MODERATE 35: CPT

## 2019-04-11 RX ORDER — VANCOMYCIN HCL 1 G
1 VIAL (EA) INTRAVENOUS
Qty: 1 | Refills: 0 | OUTPATIENT
Start: 2019-04-11 | End: 2019-04-20

## 2019-04-11 RX ADMIN — Medication 500 MILLIGRAM(S): at 13:33

## 2019-04-11 RX ADMIN — Medication 1 APPLICATION(S): at 21:13

## 2019-04-11 RX ADMIN — Medication 1 TABLET(S): at 13:34

## 2019-04-11 RX ADMIN — Medication 3 MILLILITER(S): at 18:25

## 2019-04-11 RX ADMIN — MIDODRINE HYDROCHLORIDE 10 MILLIGRAM(S): 2.5 TABLET ORAL at 13:34

## 2019-04-11 RX ADMIN — Medication 100 MILLIGRAM(S): at 06:55

## 2019-04-11 RX ADMIN — Medication 1 DROP(S): at 06:55

## 2019-04-11 RX ADMIN — BUDESONIDE AND FORMOTEROL FUMARATE DIHYDRATE 2 PUFF(S): 160; 4.5 AEROSOL RESPIRATORY (INHALATION) at 09:34

## 2019-04-11 RX ADMIN — Medication 3 MILLILITER(S): at 13:33

## 2019-04-11 RX ADMIN — Medication 300 MILLIGRAM(S): at 13:34

## 2019-04-11 RX ADMIN — Medication 12.5 MILLIGRAM(S): at 09:34

## 2019-04-11 RX ADMIN — Medication 3 MILLILITER(S): at 06:54

## 2019-04-11 RX ADMIN — LISINOPRIL 5 MILLIGRAM(S): 2.5 TABLET ORAL at 21:12

## 2019-04-11 RX ADMIN — Medication 1 DROP(S): at 21:11

## 2019-04-11 RX ADMIN — OXYCODONE HYDROCHLORIDE 5 MILLIGRAM(S): 5 TABLET ORAL at 17:22

## 2019-04-11 RX ADMIN — OXYCODONE HYDROCHLORIDE 5 MILLIGRAM(S): 5 TABLET ORAL at 16:10

## 2019-04-11 RX ADMIN — GABAPENTIN 300 MILLIGRAM(S): 400 CAPSULE ORAL at 09:33

## 2019-04-11 RX ADMIN — Medication 1 APPLICATION(S): at 13:35

## 2019-04-11 RX ADMIN — Medication 1 DROP(S): at 13:34

## 2019-04-11 RX ADMIN — TIZANIDINE 4 MILLIGRAM(S): 4 TABLET ORAL at 21:11

## 2019-04-11 RX ADMIN — APIXABAN 5 MILLIGRAM(S): 2.5 TABLET, FILM COATED ORAL at 06:54

## 2019-04-11 RX ADMIN — Medication 25 MILLIGRAM(S): at 09:49

## 2019-04-11 RX ADMIN — BUDESONIDE AND FORMOTEROL FUMARATE DIHYDRATE 2 PUFF(S): 160; 4.5 AEROSOL RESPIRATORY (INHALATION) at 21:11

## 2019-04-11 RX ADMIN — Medication 100 MILLIGRAM(S): at 21:11

## 2019-04-11 RX ADMIN — Medication 100 MILLIGRAM(S): at 13:34

## 2019-04-11 RX ADMIN — GABAPENTIN 300 MILLIGRAM(S): 400 CAPSULE ORAL at 21:13

## 2019-04-11 RX ADMIN — TIZANIDINE 4 MILLIGRAM(S): 4 TABLET ORAL at 09:34

## 2019-04-11 RX ADMIN — APIXABAN 5 MILLIGRAM(S): 2.5 TABLET, FILM COATED ORAL at 18:25

## 2019-04-11 RX ADMIN — QUETIAPINE FUMARATE 25 MILLIGRAM(S): 200 TABLET, FILM COATED ORAL at 21:11

## 2019-04-11 RX ADMIN — Medication 3 MILLILITER(S): at 23:31

## 2019-04-11 RX ADMIN — FAMOTIDINE 20 MILLIGRAM(S): 10 INJECTION INTRAVENOUS at 13:33

## 2019-04-11 RX ADMIN — Medication 12.5 MILLIGRAM(S): at 21:11

## 2019-04-11 RX ADMIN — Medication 250 MILLIGRAM(S): at 09:49

## 2019-04-11 RX ADMIN — Medication 1 APPLICATION(S): at 06:56

## 2019-04-11 NOTE — PROGRESS NOTE ADULT - ASSESSMENT
68 yr old with advanced dementia , bed bound, contracted, cva, sp removal of infected hip due to mrsa with placement of space last fall.   Pt has been havening fevers for months.  I suspect there is ongoing Om of the hip site but changing the spacer is not a realistic option . chronic Om of the sacrum usually does not cause fevers and does not need prolonged ab therapy .       cultures negative to date  uc with yeast of doubtful significance    vanco to cover mrsa  can dc meropenem  after cultures , we cac decide on endpoint of ab therapy   discussed  with      reluctant to rescan   does not want mri  temp is down on vanco    if   no good options  discussed with   pathologic fracture noted due to contractures and osteoporosis      plan several weeks of vanco realizing she may still have fever.   Discussed with  in detail  .

## 2019-04-11 NOTE — PROGRESS NOTE ADULT - SUBJECTIVE AND OBJECTIVE BOX
infectious diseases progress note:    Patient is a 68y old  Female who presents with a chief complaint of fevers (10 Apr 2019 18:50)        Fever        R   Allergies    ASA; dye contrast (Anaphylaxis)  aspirin (Short breath)  divalproex sodium (Other (Unknown))  Flowers and Plants (Short breath)  Haldol (Other (Unknown))  penicillin (Short breath; Rash)  sulfa drugs (Short breath; Rash)  vancomycin (Rash; Urticaria; Hives)  Xanax (Other (Unknown))    Intolerances        ANTIBIOTICS/RELEVANT:  antimicrobials  vancomycin  IVPB 1000 milliGRAM(s) IV Intermittent every 24 hours    immunologic:    OTHER:  acetaminophen    Suspension .. 650 milliGRAM(s) Oral every 6 hours PRN  ALBUTerol    90 MICROgram(s) HFA Inhaler 1 Puff(s) Inhalation every 4 hours  ALBUTerol/ipratropium for Nebulization 3 milliLiter(s) Nebulizer every 6 hours  apixaban 5 milliGRAM(s) Oral every 12 hours  artificial tears (preservative free) Ophthalmic Solution 1 Drop(s) Both EYES three times a day  ascorbic acid 500 milliGRAM(s) Oral daily  buDESOnide 160 MICROgram(s)/formoterol 4.5 MICROgram(s) Inhaler 2 Puff(s) Inhalation two times a day  diphenhydrAMINE   Elixir 25 milliGRAM(s) Oral every 6 hours PRN  ergocalciferol 23004 Unit(s) Oral every week  famotidine    Tablet 20 milliGRAM(s) Oral daily  ferrous    sulfate Liquid 300 milliGRAM(s) Enteral Tube daily  gabapentin   Solution 300 milliGRAM(s) Oral two times a day  hydrocortisone sodium succinate Injectable 100 milliGRAM(s) IV Push every 8 hours  lisinopril 5 milliGRAM(s) Enteral Tube at bedtime  Medical Marijuana Oil 1 milliLiter(s),Medical Marijuana Oil 1 milliLiter(s) 1 milliLiter(s) Oral <User Schedule>  metoprolol tartrate 12.5 milliGRAM(s) Enteral Tube two times a day  midodrine 10 milliGRAM(s) Oral every 8 hours  multivitamin 1 Tablet(s) Oral daily  nystatin Powder 1 Application(s) Topical two times a day PRN  oxyCODONE    IR 5 milliGRAM(s) Oral every 6 hours PRN  petrolatum white Ointment 1 Application(s) Topical three times a day  QUEtiapine 25 milliGRAM(s) Oral at bedtime  sodium chloride 0.65% Nasal 1 Spray(s) Both Nostrils three times a day PRN  sodium chloride 0.9% Bolus 500 milliLiter(s) IV Bolus once  tiotropium 18 MICROgram(s) Capsule 1 Capsule(s) Inhalation daily  tiZANidine 4 milliGRAM(s) Oral <User Schedule>      Objective:  Vital Signs Last 24 Hrs  T(C): 36.5 (11 Apr 2019 04:26), Max: 36.6 (10 Apr 2019 21:00)  T(F): 97.7 (11 Apr 2019 04:26), Max: 97.8 (10 Apr 2019 21:00)  HR: 80 (11 Apr 2019 04:26) (79 - 94)  BP: 126/77 (11 Apr 2019 04:26) (115/64 - 137/84)  BP(mean): --  RR: 18 (11 Apr 2019 04:26) (17 - 18)  SpO2: 99% (11 Apr 2019 04:26) (99% - 100%)    PHYSICAL EXAM:     Eyes:JACQUELIN, EOMI  Ear/Nose/Throat: no oral lesion, no sinus tenderness on percussion	  Neck:no JVD, no lymphadenopathy, supple  Respiratory: CTA maryjane  Cardiovascular: S1S2 RRR, no murmurs  Gastrointestinal:soft, (+) BS, no HSM  Extremities:no e/e/c        LABS:                        9.7    18.0  )-----------( 315      ( 10 Apr 2019 05:41 )             29.4     04-10    143  |  101  |  34<H>  ----------------------------<  280<H>  4.0   |  29  |  <0.30<L>    Ca    8.0<L>      10 Apr 2019 05:41  Mg     2.0     04-10              MICROBIOLOGY:    RECENT CULTURES:  04-08 @ 09:01 .Urine Catheterized                <10,000 CFU/mL Normal Urogenital Olive    04-08 @ 05:58 .Blood Blood-Peripheral                No growth to date.          RESPIRATORY CULTURES:              RADIOLOGY & ADDITIONAL STUDIES:        Pager [phone]  After 5 pm/weekends or if no response :[phone]

## 2019-04-11 NOTE — PROGRESS NOTE ADULT - SUBJECTIVE AND OBJECTIVE BOX
SUBJECTIVE: Pt seen, chart reviewed.     at bedside- all questions asked and answered to his satisfaction  No odor, redness, warmth, excess drainage noted  today no f/c/s  pt more alert, follows w/ eyes & y/n questions  ongoing diarrhea, FMS to be placed today  (+)incontinent (+)sedentary. Offloading & pericare initiated upon admission & on going   ID suspects there is Osteo of the hip - but changing the spacer is not a realistic option & that chronic Om of the sacrum usually does not cause fevers and does not need prolonged ab therapy .      ROS skin:  see HPI      apixaban 5 milliGRAM(s) Oral every 12 hours  vancomycin  IVPB 1000 milliGRAM(s) IV Intermittent every 24 hours      Physical Exam:  Vital Signs Last 24 Hrs  T(C): 36.5 (11 Apr 2019 11:58), Max: 36.6 (10 Apr 2019 21:00)  T(F): 97.7 (11 Apr 2019 11:58), Max: 97.8 (10 Apr 2019 21:00)  HR: 90 (11 Apr 2019 11:58) (80 - 94)  BP: 108/69 (11 Apr 2019 11:58) (108/69 - 126/77)  BP(mean): --  RR: 18 (11 Apr 2019 11:58) (17 - 18)  SpO2: 99% (11 Apr 2019 11:58) (99% - 100%)  General Appearance:    NAD, Alert, cachectic   Versa Care P500    Musculoskeletal/Vascular:   no BLE edema   BLE equally warm  no acute ischemia noted  BUE/BLE w/ contractures  LLE w/ immobilizer    Skin:  dry & frail    Lt Lateral malleolus 1.5cm x 0.8cm x 0cm DTI   moist pink partial thickness/ superficial  No drainage, odor, erythema, increased warmth, tenderness, induration, fluctuance    Stage 4 Sacral pressure injury  9cm x 9.5cm x 3.1cm w/ undermining 7-1 o'clock w/ greatest at 11 of 4.8cm  moist & granular  scant  nonviable tissue  (+) serosanguinous drainage    Bilateral ischium w/ partial thickness pink wounds  healing w/ reepithelialization  No odor, erythema, increased warmth, tenderness, induration, fluctuance      LABS:                        9.2    20.0  )-----------( 286      ( 11 Apr 2019 09:43 )             27.2

## 2019-04-11 NOTE — PROGRESS NOTE ADULT - ASSESSMENT
68 yr old with advanced dementia , bed bound, contracted, cva, sp removal of infected hip due to mrsa with placement of space last fall.   Pt has been havening fevers for months.      Sacral stage 4 pressure injury- Aquacel - VAC eval  Rt Ischium partial thickness wound- CAVILON comfeel  LLE wound - AQuacel  BLE elevation  Abx per Medicine/ ID  Moisturize intact skin w/ SWEEN cream BID  con't Nutrition (as tolerated), Nutrition Consult  con't Offloading   con't Pericare  Care as per medicine will follow w/ you  Upon [facility] [address] [place] [address] [phone]  Seen w/ attng and D/w team  Mona Quick PA-C [id number]  I spent 25  minutes w/ this pt of which more than 50% of the time was spent counseling & coordinating care of this pt.

## 2019-04-11 NOTE — PROGRESS NOTE ADULT - ATTENDING COMMENTS
68 yo female remains contracted , non ambulatory with immobilizer present left leg  remains non responsive , but is able to focus at times  For placement of FMS today- on GT feedings  Sacral wound remains without odor , and is not in need of sharp debridement, but a lateral area of wound contains palpable bone  Granulation tissue now partially covers bone  Sacral wound is overall improved from initial presentation as OP  VAC evaluation requested  Status d/w  , Caleb, at bedside

## 2019-04-12 ENCOUNTER — TRANSCRIPTION ENCOUNTER (OUTPATIENT)
Age: 69
End: 2019-04-12

## 2019-04-12 LAB
HCT VFR BLD CALC: 28.4 % — LOW (ref 34.5–45)
HGB BLD-MCNC: 8.8 G/DL — LOW (ref 11.5–15.5)
MCHC RBC-ENTMCNC: 30 PG — SIGNIFICANT CHANGE UP (ref 27–34)
MCHC RBC-ENTMCNC: 31 GM/DL — LOW (ref 32–36)
MCV RBC AUTO: 96.8 FL — SIGNIFICANT CHANGE UP (ref 80–100)
PLATELET # BLD AUTO: 283 K/UL — SIGNIFICANT CHANGE UP (ref 150–400)
RBC # BLD: 2.93 M/UL — LOW (ref 3.8–5.2)
RBC # FLD: 18 % — HIGH (ref 10.3–14.5)
WBC # BLD: 14.1 K/UL — HIGH (ref 3.8–10.5)
WBC # FLD AUTO: 14.1 K/UL — HIGH (ref 3.8–10.5)

## 2019-04-12 PROCEDURE — 99232 SBSQ HOSP IP/OBS MODERATE 35: CPT

## 2019-04-12 RX ORDER — HYDROCORTISONE 20 MG
100 TABLET ORAL EVERY 12 HOURS
Qty: 0 | Refills: 0 | Status: DISCONTINUED | OUTPATIENT
Start: 2019-04-12 | End: 2019-04-12

## 2019-04-12 RX ORDER — OXYCODONE HYDROCHLORIDE 5 MG/1
5 TABLET ORAL EVERY 6 HOURS
Qty: 0 | Refills: 0 | Status: DISCONTINUED | OUTPATIENT
Start: 2019-04-13 | End: 2019-04-17

## 2019-04-12 RX ORDER — LOPERAMIDE HCL 2 MG
2 TABLET ORAL ONCE
Qty: 0 | Refills: 0 | Status: DISCONTINUED | OUTPATIENT
Start: 2019-04-12 | End: 2019-04-12

## 2019-04-12 RX ORDER — CLONAZEPAM 1 MG
1.5 TABLET ORAL
Qty: 0 | Refills: 0 | Status: DISCONTINUED | OUTPATIENT
Start: 2019-04-12 | End: 2019-04-19

## 2019-04-12 RX ADMIN — BUDESONIDE AND FORMOTEROL FUMARATE DIHYDRATE 2 PUFF(S): 160; 4.5 AEROSOL RESPIRATORY (INHALATION) at 09:22

## 2019-04-12 RX ADMIN — Medication 3 MILLILITER(S): at 06:00

## 2019-04-12 RX ADMIN — Medication 500 MILLIGRAM(S): at 10:11

## 2019-04-12 RX ADMIN — Medication 100 MILLIGRAM(S): at 06:07

## 2019-04-12 RX ADMIN — Medication 250 MILLIGRAM(S): at 10:44

## 2019-04-12 RX ADMIN — Medication 300 MILLIGRAM(S): at 10:11

## 2019-04-12 RX ADMIN — Medication 3 MILLILITER(S): at 13:42

## 2019-04-12 RX ADMIN — Medication 1 APPLICATION(S): at 07:00

## 2019-04-12 RX ADMIN — QUETIAPINE FUMARATE 25 MILLIGRAM(S): 200 TABLET, FILM COATED ORAL at 22:40

## 2019-04-12 RX ADMIN — Medication 1 APPLICATION(S): at 22:12

## 2019-04-12 RX ADMIN — Medication 1 DROP(S): at 05:59

## 2019-04-12 RX ADMIN — Medication 1.5 MILLIGRAM(S): at 22:40

## 2019-04-12 RX ADMIN — TIZANIDINE 4 MILLIGRAM(S): 4 TABLET ORAL at 09:22

## 2019-04-12 RX ADMIN — BUDESONIDE AND FORMOTEROL FUMARATE DIHYDRATE 2 PUFF(S): 160; 4.5 AEROSOL RESPIRATORY (INHALATION) at 21:13

## 2019-04-12 RX ADMIN — Medication 12.5 MILLIGRAM(S): at 21:03

## 2019-04-12 RX ADMIN — GABAPENTIN 300 MILLIGRAM(S): 400 CAPSULE ORAL at 21:03

## 2019-04-12 RX ADMIN — Medication 25 MILLIGRAM(S): at 17:10

## 2019-04-12 RX ADMIN — FAMOTIDINE 20 MILLIGRAM(S): 10 INJECTION INTRAVENOUS at 10:11

## 2019-04-12 RX ADMIN — APIXABAN 5 MILLIGRAM(S): 2.5 TABLET, FILM COATED ORAL at 17:08

## 2019-04-12 RX ADMIN — Medication 1 APPLICATION(S): at 13:52

## 2019-04-12 RX ADMIN — Medication 12.5 MILLIGRAM(S): at 09:22

## 2019-04-12 RX ADMIN — LISINOPRIL 5 MILLIGRAM(S): 2.5 TABLET ORAL at 21:03

## 2019-04-12 RX ADMIN — GABAPENTIN 300 MILLIGRAM(S): 400 CAPSULE ORAL at 13:42

## 2019-04-12 RX ADMIN — Medication 1 TABLET(S): at 10:12

## 2019-04-12 RX ADMIN — Medication 3 MILLILITER(S): at 17:08

## 2019-04-12 RX ADMIN — Medication 1 DROP(S): at 21:04

## 2019-04-12 RX ADMIN — Medication 1 DROP(S): at 17:09

## 2019-04-12 RX ADMIN — TIZANIDINE 4 MILLIGRAM(S): 4 TABLET ORAL at 21:03

## 2019-04-12 RX ADMIN — Medication 3 MILLILITER(S): at 00:12

## 2019-04-12 RX ADMIN — APIXABAN 5 MILLIGRAM(S): 2.5 TABLET, FILM COATED ORAL at 06:00

## 2019-04-12 NOTE — PHYSICAL THERAPY INITIAL EVALUATION ADULT - PERTINENT HX OF CURRENT PROBLEM, REHAB EVAL
68 yr old with advanced dementia , bed bound, contracted, cva, sp removal of infected hip due to mrsa with placement of space last fall.   Pt has been havening fevers for months.  Per ID, suspected Om of the hip site, but changing the spacer is not a realistic option. During hospitalization, pt patient noted to have swollen L knee, Xray revealed L transverse distal femur fracture with apex anterior displacement.

## 2019-04-12 NOTE — PROGRESS NOTE ADULT - SUBJECTIVE AND OBJECTIVE BOX
infectious diseases progress note:    Patient is a 68y old  Female who presents with a chief complaint of fevers (11 Apr 2019 16:06)        Fever             ASA; dye contrast (Anaphylaxis)  aspirin (Short breath)  divalproex sodium (Other (Unknown))  Flowers and Plants (Short breath)  Haldol (Other (Unknown))  penicillin (Short breath; Rash)  sulfa drugs (Short breath; Rash)  vancomycin (Rash; Urticaria; Hives)  Xanax (Other (Unknown))    Intolerances        ANTIBIOTICS/RELEVANT:  antimicrobials  vancomycin  IVPB 1000 milliGRAM(s) IV Intermittent every 24 hours    immunologic:    OTHER:  acetaminophen    Suspension .. 650 milliGRAM(s) Oral every 6 hours PRN  ALBUTerol    90 MICROgram(s) HFA Inhaler 1 Puff(s) Inhalation every 4 hours  ALBUTerol/ipratropium for Nebulization 3 milliLiter(s) Nebulizer every 6 hours  apixaban 5 milliGRAM(s) Oral every 12 hours  artificial tears (preservative free) Ophthalmic Solution 1 Drop(s) Both EYES three times a day  ascorbic acid 500 milliGRAM(s) Oral daily  buDESOnide 160 MICROgram(s)/formoterol 4.5 MICROgram(s) Inhaler 2 Puff(s) Inhalation two times a day  diphenhydrAMINE   Elixir 25 milliGRAM(s) Oral every 6 hours PRN  ergocalciferol 79499 Unit(s) Oral every week  famotidine    Tablet 20 milliGRAM(s) Oral daily  ferrous    sulfate Liquid 300 milliGRAM(s) Enteral Tube daily  gabapentin   Solution 300 milliGRAM(s) Oral two times a day  hydrocortisone sodium succinate Injectable 100 milliGRAM(s) IV Push every 8 hours  lisinopril 5 milliGRAM(s) Enteral Tube at bedtime  Medical Marijuana Oil 1 milliLiter(s),Medical Marijuana Oil 1 milliLiter(s) 1 milliLiter(s) Oral <User Schedule>  metoprolol tartrate 12.5 milliGRAM(s) Enteral Tube two times a day  midodrine 10 milliGRAM(s) Oral every 8 hours  multivitamin 1 Tablet(s) Oral daily  nystatin Powder 1 Application(s) Topical two times a day PRN  oxyCODONE    IR 5 milliGRAM(s) Oral every 6 hours PRN  petrolatum white Ointment 1 Application(s) Topical three times a day  QUEtiapine 25 milliGRAM(s) Oral at bedtime  sodium chloride 0.65% Nasal 1 Spray(s) Both Nostrils three times a day PRN  sodium chloride 0.9% Bolus 500 milliLiter(s) IV Bolus once  tiotropium 18 MICROgram(s) Capsule 1 Capsule(s) Inhalation daily  tiZANidine 4 milliGRAM(s) Oral <User Schedule>      Objective:  Vital Signs Last 24 Hrs  T(C): 36.7 (12 Apr 2019 04:20), Max: 36.8 (11 Apr 2019 21:09)  T(F): 98.1 (12 Apr 2019 04:20), Max: 98.2 (11 Apr 2019 21:09)  HR: 91 (12 Apr 2019 04:20) (90 - 101)  BP: 126/68 (12 Apr 2019 04:20) (108/69 - 135/69)  BP(mean): --  RR: 18 (12 Apr 2019 04:20) (18 - 18)  SpO2: 97% (12 Apr 2019 04:20) (97% - 99%)    PHYSICAL EXAM:   -no acute distress  Eyes:JACQUELIN, EOMI  Ear/Nose/Throat: no oral lesion, no sinus tenderness on percussion	  Neck:no JVD, no lymphadenopathy, supple  Respiratory: CTA maryjane  Cardiovascular: S1S2 RRR, no murmurs  Gastrointestinal:soft, (+) BS, no HSM  Extremities:n contrqacted         LABS:                        9.2    20.0  )-----------( 286      ( 11 Apr 2019 09:43 )             27.2                   MICROBIOLOGY:    RECENT CULTURES:  04-08 @ 09:01 .Urine Catheterized                <10,000 CFU/mL Normal Urogenital Olive    04-08 @ 05:58 .Blood Blood-Peripheral                No growth to date.          RESPIRATORY CULTURES:              RADIOLOGY & ADDITIONAL STUDIES:        Pager [phone]  After 5 pm/weekends or if no response :[phone]

## 2019-04-12 NOTE — DISCHARGE NOTE PROVIDER - NSDCHHNEEDSERVICE_GEN_ALL_CORE
Rehabilitation services/Teaching and training/Medication teaching and assessment/Observation and assessment Medication teaching and assessment/Observation and assessment/Rehabilitation services/Teaching and training/Other, specify...

## 2019-04-12 NOTE — DISCHARGE NOTE PROVIDER - PROVIDER TOKENS
PROVIDER:[TOKEN:[[id number]]],PROVIDER:[TOKEN:[[id number]]],PROVIDER:[TOKEN:[[id number]]] PROVIDER:[TOKEN:[[id number]]],PROVIDER:[TOKEN:[[id number]]],PROVIDER:[TOKEN:[[id number]]],PROVIDER:[TOKEN:[[id number]]]

## 2019-04-12 NOTE — DISCHARGE NOTE NURSING/CASE MANAGEMENT/SOCIAL WORK - NSDCDPATPORTLINK_GEN_ALL_CORE
You can access the [facility] Patient Portal, offered by Health system, by registering with the following website: [email or web address]

## 2019-04-12 NOTE — PROGRESS NOTE ADULT - SUBJECTIVE AND OBJECTIVE BOX
---___---___---___---___---___---___ ---___---___---___---___---___---___---___---___---___---                    <<<  M E D I C A L   A T T E N D I N G    F O L L O W    U P   N O T E  >>>  patient bedbound now with sacral decubitus and rectal tube and recent fracture of the left leg . h/o dementia ,asthma, chf ,    ---___---___---___---___---___---      <<<  MEDICATIONS:  >>>    MEDICATIONS  (STANDING):  ALBUTerol    90 MICROgram(s) HFA Inhaler 1 Puff(s) Inhalation every 4 hours  ALBUTerol/ipratropium for Nebulization 3 milliLiter(s) Nebulizer every 6 hours  apixaban 5 milliGRAM(s) Oral every 12 hours  artificial tears (preservative free) Ophthalmic Solution 1 Drop(s) Both EYES three times a day  ascorbic acid 500 milliGRAM(s) Oral daily  buDESOnide 160 MICROgram(s)/formoterol 4.5 MICROgram(s) Inhaler 2 Puff(s) Inhalation two times a day  ergocalciferol 82195 Unit(s) Oral every week  famotidine    Tablet 20 milliGRAM(s) Oral daily  ferrous    sulfate Liquid 300 milliGRAM(s) Enteral Tube daily  gabapentin   Solution 300 milliGRAM(s) Oral two times a day  hydrocortisone sodium succinate Injectable 100 milliGRAM(s) IV Push every 12 hours  lisinopril 5 milliGRAM(s) Enteral Tube at bedtime  Medical Marijuana Oil 1 milliLiter(s),Medical Marijuana Oil 1 milliLiter(s) 1 milliLiter(s) Oral <User Schedule>  metoprolol tartrate 12.5 milliGRAM(s) Enteral Tube two times a day  multivitamin 1 Tablet(s) Oral daily  petrolatum white Ointment 1 Application(s) Topical three times a day  QUEtiapine 25 milliGRAM(s) Oral at bedtime  sodium chloride 0.9% Bolus 500 milliLiter(s) IV Bolus once  tiotropium 18 MICROgram(s) Capsule 1 Capsule(s) Inhalation daily  tiZANidine 4 milliGRAM(s) Oral <User Schedule>  vancomycin  IVPB 1000 milliGRAM(s) IV Intermittent every 24 hours      MEDICATIONS  (PRN):  acetaminophen    Suspension .. 650 milliGRAM(s) Oral every 6 hours PRN Temp greater or equal to 38C (100.4F), Mild Pain (1 - 3), Moderate Pain (4 - 6)  diphenhydrAMINE   Elixir 25 milliGRAM(s) Oral every 6 hours PRN Rash and/or Itching  nystatin Powder 1 Application(s) Topical two times a day PRN rash/itchiness  oxyCODONE    IR 5 milliGRAM(s) Oral every 6 hours PRN Moderate Pain (4 - 6)/Severe Pain (7 - 10)  sodium chloride 0.65% Nasal 1 Spray(s) Both Nostrils three times a day PRN Nasal Congestion       ---___---___---___---___---___---     <<<REVIEW OF SYSTEM: >>>    unable to obtain      ---___---___---___---___---___---          <<<  VITAL SIGNS: >>>    T(F): 98.1 (04-12-19 @ 04:20), Max: 98.2 (04-11-19 @ 21:09)  HR: 94 (04-12-19 @ 09:20) (91 - 101)  BP: 146/70 (04-12-19 @ 09:20) (126/68 - 146/70)  RR: 18 (04-12-19 @ 04:20) (18 - 18)  SpO2: 97% (04-12-19 @ 04:20) (97% - 97%)  Wt(kg): --  CAPILLARY BLOOD GLUCOSE        I&O's Summary    11 Apr 2019 07:01  -  12 Apr 2019 07:00  --------------------------------------------------------  IN: 0 mL / OUT: 800 mL / NET: -800 mL         ---___---___---___---___---___---                       PHYSICAL EXAM:    GEN: A&O X  0, NAD , comfortable  HEENT: NCAT, PERRL, MMM, no scleral icterus, hearing intact   NECK: Supple, No JVD  CVS: S1S2 , regular , No M/R/G appreciated  PULM: CTA B/L,  no W/R/R appreciated  ABD.: soft. non tender, non distended,  bowel sounds present (+) peg noted  peg tube in place   Extrem: intact pulses , no edema noted left  leg fracture   Derm: No rash or ecchymosis noted sacral decubitus noted  large wound   PSYCH: normal mood, no depression, not anxious     ---___---___---___---___---___---     <<<  LAB AND IMAGING: >>>                          9.2    20.0  )-----------( 286      ( 11 Apr 2019 09:43 )             27.2                                                [All pertinent / recent available Imaging reports and other labs reviewed]     ---___---___---___---___---___---___ ---___---___---___---___---           <<<  A S S E S S M E N T   A N D   P L A N :  >>>    sacral decubitus contiue wound care will try wound vac and rectal tube while here. also will  dc home with  wound vac   fracture of left leg supportive care  hyperglycemia on steroids will give insulin sliding scale   chronic pain continue medical marijuana  asthma on budesonide   chf on metoprolol and lisinopril   h/o dvt on eliquis   neuronti for pain   seroquel and klonopin for agitation    tizanidine for muscle spasm       -GI/DVT Prophylaxis.    --------------------------------------------  Case discussed with   Education given on     >>______________________<<      Deniz Bolden .         phone   [phone]

## 2019-04-12 NOTE — DISCHARGE NOTE PROVIDER - CARE PROVIDERS DIRECT ADDRESSES
[email or web address]

## 2019-04-12 NOTE — CHART NOTE - NSCHARTNOTEFT_GEN_A_CORE
pt continues with loose bowel movements.  reports BM's are becoming firmer. discussed provider to RN with NP.     increase Banantrol to 3 packs daily @ 8am, 12pm and 4pm.   1/2 pack with 120ml H20. 1 hr later 1/2 pack with 120ml H20.    Recommend monitor fluids and need to adjust.   Recommend monitor BM's and need to increase banatrol to a maximum of 6 packs daily.

## 2019-04-12 NOTE — CHART NOTE - NSCHARTNOTEFT_GEN_A_CORE
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  at bedside  Patient needs oral suction to prevent choking from dysphagia with PEG tube, and will be need to be discharged with a oral suction machine to protect airway.  understands and agrees with the plan.

## 2019-04-12 NOTE — PHYSICAL THERAPY INITIAL EVALUATION ADULT - MODALITIES TREATMENT COMMENTS
Stage IV sacral pressure ulcer with exposed bone measuring 9cm x 9.5 cm x 3 cm, +undermining 7-1 o'clock w/ greatest at 11 of 4.8cm. No purulence, no erythema, no malodor.

## 2019-04-12 NOTE — DISCHARGE NOTE PROVIDER - NSDCCPCAREPLAN_GEN_ALL_CORE_FT
PRINCIPAL DISCHARGE DIAGNOSIS  Diagnosis: Fever  Assessment and Plan of Treatment: c/w Vancomycin 1 gram daily till 05/05/2019  Call you Health care provider upon arrival home to make a one week follow up appointment.  If you develop fever, chills, malaise, or change in mental status call your Health Care Provider or go to the Emergency Department.  Nutrition is important, eat small frequent meals to help ensure you get adequate calories.  Do not stay in bed all day!  Increase your activity daily as tolerated.        SECONDARY DISCHARGE DIAGNOSES  Diagnosis: Ulcerative colitis  Assessment and Plan of Treatment: c/w Peg feeds   c/w Lomotil PRN for diarrhea   c/w steroids   please follow up with DR Yan    Diagnosis: Functional quadriplegia  Assessment and Plan of Treatment: Physical therapy as recommended    Diagnosis: Chronic systolic heart failure  Assessment and Plan of Treatment:   If you gain 3lbs in 3 days, or 5lbs in a week call your Health Care Provider.  Do not eat or drink foods containing more than 2000mg of salt (sodium) in your diet every day.  Call your Health Care Provider if you have any swelling or increased swelling in your feet, ankles, and/or stomach.  Take all of your medication as directed.  If you become dizzy call your Health Care Provider.      Diagnosis: Pressure injury of sacral region, stage 4  Assessment and Plan of Treatment: wound vac to be evaluated by home care team   Local wound care   c/w MVI and Vitamin c    Diagnosis: Iron deficiency anemia  Assessment and Plan of Treatment: c/w Ferrous sulfate    Diagnosis: Asthma  Assessment and Plan of Treatment: c/w Inhalers PRINCIPAL DISCHARGE DIAGNOSIS  Diagnosis: Fever  Assessment and Plan of Treatment: secondary to R prosthetic hip infection   c/w Vancomycin 1 gram daily till 05/05/2019, please follow up with DR Brand   Call you Health care provider upon arrival home to make a one week follow up appointment.  If you develop fever, chills, malaise, or change in mental status call your Health Care Provider or go to the Emergency Department.  Nutrition is important, eat small frequent meals to help ensure you get adequate calories.  Do not stay in bed all day!  Increase your activity daily as tolerated.        SECONDARY DISCHARGE DIAGNOSES  Diagnosis: Fracture of femur, distal  Assessment and Plan of Treatment: c/w  FractureBrace   Non weight bearing on LLE   please follow up with DR Rey    Diagnosis: Ulcerative colitis  Assessment and Plan of Treatment: c/w Peg feeds   c/w Lomotil PRN for diarrhea   c/w steroids   please follow up with DR Yan    Diagnosis: Functional quadriplegia  Assessment and Plan of Treatment: Physical therapy as recommended    Diagnosis: Chronic systolic heart failure  Assessment and Plan of Treatment:   If you gain 3lbs in 3 days, or 5lbs in a week call your Health Care Provider.  Do not eat or drink foods containing more than 2000mg of salt (sodium) in your diet every day.  Call your Health Care Provider if you have any swelling or increased swelling in your feet, ankles, and/or stomach.  Take all of your medication as directed.  If you become dizzy call your Health Care Provider.      Diagnosis: Pressure injury of sacral region, stage 4  Assessment and Plan of Treatment: wound vac to be evaluated by home care team   Local wound care   c/w MVI and Vitamin c  please follow up with DR Shin    Diagnosis: Iron deficiency anemia  Assessment and Plan of Treatment: c/w Ferrous sulfate    Diagnosis: Asthma  Assessment and Plan of Treatment: c/w Inhalers PRINCIPAL DISCHARGE DIAGNOSIS  Diagnosis: Fever  Assessment and Plan of Treatment: secondary to R prosthetic hip infection   c/w Vancomycin 1 gram daily till 05/13/2019, please follow up with DR Brand   Call you Health care provider upon arrival home to make a one week follow up appointment.  If you develop fever, chills, malaise, or change in mental status call your Health Care Provider or go to the Emergency Department.  Nutrition is important, eat small frequent meals to help ensure you get adequate calories.  Do not stay in bed all day!  Increase your activity daily as tolerated.        SECONDARY DISCHARGE DIAGNOSES  Diagnosis: Fracture of femur, distal  Assessment and Plan of Treatment: c/w  FractureBrace   Non weight bearing on LLE   please follow up with DR Rey    Diagnosis: Ulcerative colitis  Assessment and Plan of Treatment: c/w Peg feeds   c/w Lomotil PRN for diarrhea   c/w steroids   please follow up with DR Yan    Diagnosis: Functional quadriplegia  Assessment and Plan of Treatment: Physical therapy as recommended    Diagnosis: Chronic systolic heart failure  Assessment and Plan of Treatment:   If you gain 3lbs in 3 days, or 5lbs in a week call your Health Care Provider.  Do not eat or drink foods containing more than 2000mg of salt (sodium) in your diet every day.  Call your Health Care Provider if you have any swelling or increased swelling in your feet, ankles, and/or stomach.  Take all of your medication as directed.  If you become dizzy call your Health Care Provider.      Diagnosis: Pressure injury of sacral region, stage 4  Assessment and Plan of Treatment: wound vac to be evaluated by  DR Shin in 2-3 weeks   Local wound care  with aquacel   c/w MVI and Vitamin c  please follow up with DR Shin    Diagnosis: Iron deficiency anemia  Assessment and Plan of Treatment: c/w Ferrous sulfate    Diagnosis: Asthma  Assessment and Plan of Treatment: c/w Inhalers

## 2019-04-12 NOTE — CHART NOTE - NSCHARTNOTEFT_GEN_A_CORE
Noted that Pt on  IV hydrocortisone 100 mg 3 X DAY , after d/w DR Bolden changed to Po taper   Pt is being d/c planned   to home Noted that Pt on  IV hydrocortisone 100 mg 3 X DAY , after d/w DR Bolden changed to Po taper   Pt is being d/c planned   to home  with Wound Vac on Monday 04/15/2019  Rectal tube to be d/gracie on Discharge , if Pt responding to Imodium , may add  more doses , to control liquid stool . closely monitor rectal tube OP   Vanomycin to be completed on 04/22/2019.   anil Pierre NP-C [id number]

## 2019-04-12 NOTE — PROGRESS NOTE ADULT - ASSESSMENT
68 yr old with advanced dementia , bed bound, contracted, cva, sp removal of infected hip due to mrsa with placement of space last fall.   Pt has been havening fevers for months.  I suspect there is ongoing Om of the hip site but changing the spacer is not a realistic option . chronic Om of the sacrum usually does not cause fevers and does not need prolonged ab therapy .       cultures negative to date  uc with yeast of doubtful significance    vanco to cover mrsa  can dc meropenem  after cultures , we cac decide on endpoint of ab therapy   discussed  with      reluctant to rescan   does not want mri  temp is down on vanco    if   no good options  discussed with   pathologic fracture noted due to contractures and osteoporosis      plan several weeks of vanco realizing she may still have fever.   Discussed with  in detail   to be discharged  sat discussed with care coordinator  level is low but this is for suppression not cure   .

## 2019-04-12 NOTE — DISCHARGE NOTE PROVIDER - HOSPITAL COURSE
69 y/o F with PMHx of advanced dementia (nonverbal, dysphagia with PEG tube, functional quadriplegic, chronic gavin catheter), sacral stage 4 pressure ulcer, heel pressure ulcers, asthma on chronic prednisone., hLd, CVA, UC (in remission for 30years), right prosthetic Hip MRSA infection in 7/2018 s/p spacer placement, HFrEF, elevated pulmonary pressure .---- is admitted for fevers x months. Patient had 1 set of (+) blood cultures for gram (+) cocci clusters on 03/29/2019. Patient currently being treated with IV vanco for chronic OM of right hip. Left knee x-ray revealed displaced fracture of distal left femur on 04/02/2019; ortho placed patient in brace, and did NOT recommend surgery. PICC line put in place on 04/03/2019, and will continue for IV vanco for several weeks to cover for MRSA as per ID. Rectal tube placed on 04/09/2019 for recurrent diarrhea, and was discontinued on 04/12/2019. Wound vac for sacral stage 4 pressure ulcer ordered on 04/12/2019, and patient will be discharged with it in place. Discussed with Dr. Bolden, and hemodynamically stable for discharge. 68 year-old female with multiple prior inpatient admissions, PMH of advanced dementia (nonverbal, dysphagia with PEG tube, functional quadriplegic, chronic Blackman catheter), sacral stage 4 pressure ulcer, heel pressure ulcers, asthma on chronic prednisone, HLD, CVA, UC (in remission for 30years), right prosthetic hip MRSA infection in 7/2018 s/p spacer placement, HFrEF, elevated pulmonary pressure, brought to the recurrent fevers, increasing lethargy, and labored breathing – admitted for severe sepsis.  Infectious disease attending noted that fevers have been ongoing for months.  Patient had 1 set of (+) blood cultures for gram (+) cocci clusters on 03/29/2019.  Patient currently being treated with IV vanco for chronic OM of right hip via LUE PICC line placed on 04/03/2019 - IV vancomycin will continue until 5/7/2019 per Infectious Disease.  Left knee x-ray revealed displaced fracture of distal left femur on 04/02/2019; ortho placed patient in brace, and did NOT recommend surgery.  Lt knee xray done on 4/23/2019 was stable, revealing only soft tissue swealling.   Rectal tube placed on 04/11/2019 for recurrent diarrhea and was discontinued on 04/16/2019.  Patient was started on trial of Banatrol on 4/10 and loperamide on 4/17 for diarrhea.  Banatrol was discontinued on 4/24 per 's insistence and GI consult was requested..........  Wound vac for sacral stage 4 pressure ulcer ordered on 04/12/2019 was discontinued by PT/wound on 4/23/2019.  Blackman catheter was changed on 4/23/2019.  and patient will be discharged with it in place. Discussed with  ........................, and hemodynamically stable for discharge. 68 year-old female with multiple prior inpatient admissions, PMH of advanced dementia (nonverbal, dysphagia with PEG tube, functional quadriplegic, chronic Blackman catheter), sacral stage 4 pressure ulcer, heel pressure ulcers, asthma on chronic prednisone, HLD, CVA, UC (in remission for 30years), right prosthetic hip MRSA infection in 7/2018 s/p spacer placement, HFrEF, elevated pulmonary pressure, brought to the recurrent fevers, increasing lethargy, and labored breathing – admitted for severe sepsis.  Infectious disease attending noted that fevers have been ongoing for months.  Patient had 1 set of (+) blood cultures for gram (+) cocci clusters on 03/29/2019.  Patient currently being treated with IV vanco for chronic OM of right hip via LUE PICC line placed on 04/03/2019 - IV vancomycin will continue until 5/7/2019 per Infectious Disease.  Left knee x-ray revealed displaced fracture of distal left femur on 04/02/2019; ortho placed patient in brace, and did NOT recommend surgery.  Lt knee xray done on 4/23/2019 was stable, revealing only soft tissue swealling.   Rectal tube placed on 04/11/2019 for recurrent diarrhea and was discontinued on 04/16/2019.  Patient was started on trial of Banatrol on 4/10 and loperamide on 4/17 for diarrhea.  Banatrol was discontinued on 4/24 per 's insistence and GI consult was requested..........  Wound vac for sacral stage 4 pressure ulcer ordered on 04/12/2019 was discontinued by PT/wound on 4/23/2019.  Blackman catheter was changed on 4/23/2019.  For her anemia, patient received PRBC transfusions to keep hemoglobin above 8.0 - approprieate hemoglobin response noted.          and patient will be discharged with it in place. Discussed with  ........................, and hemodynamically stable for discharge. 68 year-old female with multiple prior inpatient admissions, PMH of advanced dementia (nonverbal, dysphagia with PEG tube, functional quadriplegic, chronic Blackman catheter), sacral stage 4 pressure ulcer, heel pressure ulcers, asthma on chronic prednisone, HLD, CVA, UC (in remission for 30years), right prosthetic hip MRSA infection in 7/2018 s/p spacer placement, HFrEF, elevated pulmonary pressure, brought to the recurrent fevers, increasing lethargy, and labored breathing – admitted for severe sepsis.  Infectious disease attending noted that fevers have been ongoing for months.  Patient had 1 set of (+) blood cultures for gram (+) cocci clusters on 03/29/2019.  Patient currently being treated with IV vanco for chronic OM of right hip via LUE PICC line placed on 04/03/2019 - IV vancomycin will continue until 5/7/2019 per Infectious Disease.  Left knee x-ray revealed displaced fracture of distal left femur on 04/02/2019; ortho placed patient in brace, and did NOT recommend surgery.  Lt knee xray done on 4/23/2019 was stable, revealing only soft tissue swealling.   Rectal tube placed on 04/11/2019 for recurrent diarrhea and was discontinued on 04/16/2019.  Patient was started on trial of Banatrol on 4/10 and loperamide on 4/17 for diarrhea.  Banatrol was discontinued on 4/24 per 's insistence and GI consult was requested..........  Wound vac for sacral stage 4 pressure ulcer ordered on 04/12/2019 was discontinued by PT/wound on 4/23/2019.  Blackman catheter was changed on 4/23/2019.  For her anemia, patient received PRBC transfusions to keep hemoglobin above 8.0 - approprieate hemoglobin response noted.  Owing to intermittent hypoxia - patient is being discharged on home oxygen.        and patient will be discharged with it in place. Discussed with  ........................, and hemodynamically stable for discharge. 68 year-old female with multiple prior inpatient admissions, PMH of advanced dementia (nonverbal, dysphagia with PEG tube, functional quadriplegic, chronic Blackman catheter), sacral stage 4 pressure ulcer, heel pressure ulcers, asthma on chronic prednisone, HLD, CVA, UC (in remission for 30years), right prosthetic hip MRSA infection in 7/2018 s/p spacer placement, HFrEF, elevated pulmonary pressure, brought to the recurrent fevers, increasing lethargy, and labored breathing – admitted for severe sepsis.  Infectious disease attending noted that fevers have been ongoing for months.  Patient had 1 set of (+) blood cultures for gram (+) cocci clusters on 03/29/2019.  Patient currently being treated with IV vanco for chronic OM of right hip via LUE PICC line placed on 04/03/2019 - IV vancomycin will continue until 5/7/2019 per Infectious Disease.  Left knee x-ray revealed displaced fracture of distal left femur on 04/02/2019; ortho placed patient in brace, and did NOT recommend surgery.  Lt knee xray done on 4/23/2019 was stable, revealing only soft tissue swealling.   Rectal tube placed on 04/11/2019 for recurrent diarrhea and was discontinued on 04/16/2019.  Patient was started on trial of Banatrol on 4/10 and loperamide on 4/17 for diarrhea.  Banatrol was discontinued on 4/24 per 's insistence and GI consult was requested..........  Wound vac for sacral stage 4 pressure ulcer ordered on 04/12/2019 was discontinued by PT/wound on 4/23/2019.  Blackman catheter was changed on 4/23/2019.  For her anemia, patient received PRBC transfusions to keep hemoglobin above 8.0 - approprieate hemoglobin response noted.  Owing to intermittent hypoxia - patient is being discharged on home oxygen.    and patient will be discharged with it in place. Discussed with  and hemodynamically stable for discharge. 68 year-old female with multiple prior inpatient admissions, PMH of advanced dementia (nonverbal, dysphagia with PEG tube, functional quadriplegic, chronic Blackman catheter), sacral stage 4 pressure ulcer, heel pressure ulcers, asthma on chronic prednisone, HLD, CVA, UC (in remission for 30years), right prosthetic hip MRSA infection in 7/2018 s/p spacer placement, HFrEF, elevated pulmonary pressure, brought to the recurrent fevers, increasing lethargy, and labored breathing – admitted for severe sepsis.  Infectious disease attending noted that fevers have been ongoing for months.  Patient had 1 set of (+) blood cultures for gram (+) cocci clusters on 03/29/2019.  Patient currently being treated with IV vanco for chronic OM of right hip via LUE PICC line placed on 04/03/2019 - IV vancomycin will continue until 5/7/2019 per Infectious Disease.  Left knee x-ray revealed displaced fracture of distal left femur on 04/02/2019; ortho placed patient in brace, and did NOT recommend surgery.  Lt knee xray done on 4/23/2019 was stable, revealing only soft tissue swealling.   Rectal tube placed on 04/11/2019 for recurrent diarrhea and was discontinued on 04/16/2019.  Patient was started on trial of Banatrol on 4/10 and loperamide on 4/17 for diarrhea.  Banatrol was discontinued on 4/24 per 's insistence and GI consult was requested . Wound vac for sacral stage 4 pressure ulcer ordered on 04/12/2019 was discontinued by PT/wound on 4/23/2019.  Blackman catheter was changed on 4/23/2019.  For her anemia, patient received PRBC transfusions to keep hemoglobin above 8.0 - approprieate hemoglobin response noted.  Pt developed fevers and  bacteremia , now cleared . Will be discharged Home with Home care  with PICC line and Blackman  and 1 more week of 1 gram Vanco daily .     Pt need  speech therapy eval , Occupational therapy Physical therapy  follow up , With [facility] .

## 2019-04-13 LAB
CULTURE RESULTS: SIGNIFICANT CHANGE UP
CULTURE RESULTS: SIGNIFICANT CHANGE UP
GLUCOSE BLDC GLUCOMTR-MCNC: 187 MG/DL — HIGH (ref 70–99)
GLUCOSE BLDC GLUCOMTR-MCNC: 227 MG/DL — HIGH (ref 70–99)
GLUCOSE BLDC GLUCOMTR-MCNC: 259 MG/DL — HIGH (ref 70–99)
GLUCOSE BLDC GLUCOMTR-MCNC: 272 MG/DL — HIGH (ref 70–99)
HCT VFR BLD CALC: 26.9 % — LOW (ref 34.5–45)
HGB BLD-MCNC: 8.8 G/DL — LOW (ref 11.5–15.5)
MCHC RBC-ENTMCNC: 31.3 PG — SIGNIFICANT CHANGE UP (ref 27–34)
MCHC RBC-ENTMCNC: 32.7 GM/DL — SIGNIFICANT CHANGE UP (ref 32–36)
MCV RBC AUTO: 95.7 FL — SIGNIFICANT CHANGE UP (ref 80–100)
PLATELET # BLD AUTO: 273 K/UL — SIGNIFICANT CHANGE UP (ref 150–400)
RBC # BLD: 2.82 M/UL — LOW (ref 3.8–5.2)
RBC # FLD: 18 % — HIGH (ref 10.3–14.5)
SPECIMEN SOURCE: SIGNIFICANT CHANGE UP
SPECIMEN SOURCE: SIGNIFICANT CHANGE UP
WBC # BLD: 17.2 K/UL — HIGH (ref 3.8–10.5)
WBC # FLD AUTO: 17.2 K/UL — HIGH (ref 3.8–10.5)

## 2019-04-13 RX ORDER — DEXTROSE 50 % IN WATER 50 %
25 SYRINGE (ML) INTRAVENOUS ONCE
Qty: 0 | Refills: 0 | Status: DISCONTINUED | OUTPATIENT
Start: 2019-04-13 | End: 2019-05-06

## 2019-04-13 RX ORDER — GLUCAGON INJECTION, SOLUTION 0.5 MG/.1ML
1 INJECTION, SOLUTION SUBCUTANEOUS ONCE
Qty: 0 | Refills: 0 | Status: DISCONTINUED | OUTPATIENT
Start: 2019-04-13 | End: 2019-05-06

## 2019-04-13 RX ORDER — SODIUM CHLORIDE 9 MG/ML
1000 INJECTION, SOLUTION INTRAVENOUS
Qty: 0 | Refills: 0 | Status: DISCONTINUED | OUTPATIENT
Start: 2019-04-13 | End: 2019-05-06

## 2019-04-13 RX ORDER — INSULIN LISPRO 100/ML
VIAL (ML) SUBCUTANEOUS AT BEDTIME
Qty: 0 | Refills: 0 | Status: DISCONTINUED | OUTPATIENT
Start: 2019-04-13 | End: 2019-05-06

## 2019-04-13 RX ORDER — DEXTROSE 50 % IN WATER 50 %
15 SYRINGE (ML) INTRAVENOUS ONCE
Qty: 0 | Refills: 0 | Status: DISCONTINUED | OUTPATIENT
Start: 2019-04-13 | End: 2019-05-06

## 2019-04-13 RX ORDER — INSULIN LISPRO 100/ML
VIAL (ML) SUBCUTANEOUS
Qty: 0 | Refills: 0 | Status: DISCONTINUED | OUTPATIENT
Start: 2019-04-13 | End: 2019-05-06

## 2019-04-13 RX ORDER — DEXTROSE 50 % IN WATER 50 %
12.5 SYRINGE (ML) INTRAVENOUS ONCE
Qty: 0 | Refills: 0 | Status: DISCONTINUED | OUTPATIENT
Start: 2019-04-13 | End: 2019-05-06

## 2019-04-13 RX ORDER — ALTEPLASE 100 MG
2 KIT INTRAVENOUS ONCE
Qty: 0 | Refills: 0 | Status: COMPLETED | OUTPATIENT
Start: 2019-04-13 | End: 2019-04-13

## 2019-04-13 RX ADMIN — APIXABAN 5 MILLIGRAM(S): 2.5 TABLET, FILM COATED ORAL at 06:00

## 2019-04-13 RX ADMIN — QUETIAPINE FUMARATE 25 MILLIGRAM(S): 200 TABLET, FILM COATED ORAL at 21:02

## 2019-04-13 RX ADMIN — Medication 500 MILLIGRAM(S): at 13:23

## 2019-04-13 RX ADMIN — Medication 1 TABLET(S): at 13:23

## 2019-04-13 RX ADMIN — Medication 1: at 17:56

## 2019-04-13 RX ADMIN — Medication 3 MILLILITER(S): at 05:29

## 2019-04-13 RX ADMIN — Medication 1 DROP(S): at 05:30

## 2019-04-13 RX ADMIN — GABAPENTIN 300 MILLIGRAM(S): 400 CAPSULE ORAL at 09:21

## 2019-04-13 RX ADMIN — TIZANIDINE 4 MILLIGRAM(S): 4 TABLET ORAL at 21:02

## 2019-04-13 RX ADMIN — Medication 3: at 13:39

## 2019-04-13 RX ADMIN — APIXABAN 5 MILLIGRAM(S): 2.5 TABLET, FILM COATED ORAL at 17:01

## 2019-04-13 RX ADMIN — Medication 1 APPLICATION(S): at 13:33

## 2019-04-13 RX ADMIN — Medication 25 MILLIGRAM(S): at 05:30

## 2019-04-13 RX ADMIN — Medication 300 MILLIGRAM(S): at 13:22

## 2019-04-13 RX ADMIN — GABAPENTIN 300 MILLIGRAM(S): 400 CAPSULE ORAL at 21:02

## 2019-04-13 RX ADMIN — FAMOTIDINE 20 MILLIGRAM(S): 10 INJECTION INTRAVENOUS at 13:23

## 2019-04-13 RX ADMIN — BUDESONIDE AND FORMOTEROL FUMARATE DIHYDRATE 2 PUFF(S): 160; 4.5 AEROSOL RESPIRATORY (INHALATION) at 12:23

## 2019-04-13 RX ADMIN — TIZANIDINE 4 MILLIGRAM(S): 4 TABLET ORAL at 09:34

## 2019-04-13 RX ADMIN — Medication 25 MILLIGRAM(S): at 17:01

## 2019-04-13 RX ADMIN — Medication 1.5 MILLIGRAM(S): at 21:08

## 2019-04-13 RX ADMIN — BUDESONIDE AND FORMOTEROL FUMARATE DIHYDRATE 2 PUFF(S): 160; 4.5 AEROSOL RESPIRATORY (INHALATION) at 21:02

## 2019-04-13 RX ADMIN — Medication 1 APPLICATION(S): at 21:08

## 2019-04-13 RX ADMIN — Medication 3 MILLILITER(S): at 23:30

## 2019-04-13 RX ADMIN — Medication 12.5 MILLIGRAM(S): at 09:21

## 2019-04-13 RX ADMIN — Medication 12.5 MILLIGRAM(S): at 21:02

## 2019-04-13 RX ADMIN — LISINOPRIL 5 MILLIGRAM(S): 2.5 TABLET ORAL at 21:02

## 2019-04-13 RX ADMIN — Medication 1 APPLICATION(S): at 06:00

## 2019-04-13 RX ADMIN — ALTEPLASE 2 MILLIGRAM(S): KIT at 16:01

## 2019-04-13 RX ADMIN — Medication 25 MILLIGRAM(S): at 09:22

## 2019-04-13 RX ADMIN — Medication 3 MILLILITER(S): at 13:23

## 2019-04-13 RX ADMIN — Medication 3 MILLILITER(S): at 17:01

## 2019-04-13 RX ADMIN — Medication 1 DROP(S): at 21:02

## 2019-04-13 RX ADMIN — Medication 250 MILLIGRAM(S): at 17:03

## 2019-04-13 NOTE — PROGRESS NOTE ADULT - SUBJECTIVE AND OBJECTIVE BOX
---___---___---___---___---___---___ ---___---___---___---___---___---___---___---___---___---                    <<<  M E D I C A L   A T T E N D I N G    F O L L O W    U P   N O T E  >>>  patient laying in bed unchanged     ---___---___---___---___---___---      <<<  MEDICATIONS:  >>>    MEDICATIONS  (STANDING):  ALBUTerol    90 MICROgram(s) HFA Inhaler 1 Puff(s) Inhalation every 4 hours  ALBUTerol/ipratropium for Nebulization 3 milliLiter(s) Nebulizer every 6 hours  apixaban 5 milliGRAM(s) Oral every 12 hours  artificial tears (preservative free) Ophthalmic Solution 1 Drop(s) Both EYES three times a day  ascorbic acid 500 milliGRAM(s) Oral daily  buDESOnide 160 MICROgram(s)/formoterol 4.5 MICROgram(s) Inhaler 2 Puff(s) Inhalation two times a day  clonazePAM Tablet 1.5 milliGRAM(s) Oral <User Schedule>  dextrose 5%. 1000 milliLiter(s) (50 mL/Hr) IV Continuous <Continuous>  dextrose 50% Injectable 12.5 Gram(s) IV Push once  dextrose 50% Injectable 25 Gram(s) IV Push once  dextrose 50% Injectable 25 Gram(s) IV Push once  ergocalciferol 17403 Unit(s) Oral every week  famotidine    Tablet 20 milliGRAM(s) Oral daily  ferrous    sulfate Liquid 300 milliGRAM(s) Enteral Tube daily  gabapentin   Solution 300 milliGRAM(s) Oral two times a day  insulin lispro (HumaLOG) corrective regimen sliding scale   SubCutaneous three times a day before meals  insulin lispro (HumaLOG) corrective regimen sliding scale   SubCutaneous at bedtime  lisinopril 5 milliGRAM(s) Enteral Tube at bedtime  Medical Marijuana Oil 1 milliLiter(s) 1 milliLiter(s) Oral <User Schedule>  metoprolol tartrate 12.5 milliGRAM(s) Enteral Tube two times a day  multivitamin 1 Tablet(s) Oral daily  petrolatum white Ointment 1 Application(s) Topical three times a day  predniSONE   Tablet   Oral   predniSONE   Tablet 25 milliGRAM(s) Oral every 12 hours  QUEtiapine 25 milliGRAM(s) Oral at bedtime  sodium chloride 0.9% Bolus 500 milliLiter(s) IV Bolus once  tiotropium 18 MICROgram(s) Capsule 1 Capsule(s) Inhalation daily  tiZANidine 4 milliGRAM(s) Oral <User Schedule>  vancomycin  IVPB 1000 milliGRAM(s) IV Intermittent every 24 hours      MEDICATIONS  (PRN):  acetaminophen    Suspension .. 650 milliGRAM(s) Oral every 6 hours PRN Temp greater or equal to 38C (100.4F), Mild Pain (1 - 3), Moderate Pain (4 - 6)  dextrose 40% Gel 15 Gram(s) Oral once PRN Blood Glucose LESS THAN 70 milliGRAM(s)/deciLiter  diphenhydrAMINE   Elixir 25 milliGRAM(s) Oral every 6 hours PRN Rash and/or Itching  glucagon  Injectable 1 milliGRAM(s) IntraMuscular once PRN Glucose <70 milliGRAM(s)/deciLiter  nystatin Powder 1 Application(s) Topical two times a day PRN rash/itchiness  oxyCODONE    IR 5 milliGRAM(s) Oral every 6 hours PRN Moderate Pain (4 - 6)/Severe Pain (7 - 10)  sodium chloride 0.65% Nasal 1 Spray(s) Both Nostrils three times a day PRN Nasal Congestion       ---___---___---___---___---___---     <<<REVIEW OF SYSTEM: >>>    unable to obtain due to dementia      ---___---___---___---___---___---          <<<  VITAL SIGNS: >>>    T(F): 98.8 (04-13-19 @ 04:25), Max: 99.2 (04-12-19 @ 20:49)  HR: 98 (04-13-19 @ 04:25) (94 - 111)  BP: 144/77 (04-13-19 @ 04:25) (138/70 - 157/78)  RR: 18 (04-13-19 @ 04:25) (18 - 18)  SpO2: 96% (04-13-19 @ 04:25) (95% - 96%)  Wt(kg): --  CAPILLARY BLOOD GLUCOSE        I&O's Summary    12 Apr 2019 07:01  -  13 Apr 2019 07:00  --------------------------------------------------------  IN: 1266 mL / OUT: 1150 mL / NET: 116 mL         ---___---___---___---___---___---                       PHYSICAL EXAM:    GEN: A&O X 0 ,bedbound   HEENT: NCAT, PERRL, MMM, no scleral icterus, hearing intact  NECK: Supple, No JVD  CVS: S1S2 , regular , No M/R/G appreciated  PULM: CTA B/L,  no W/R/R appreciated  ABD.: soft. non tender, non distended,  bowel sounds present peg in place   Extrem: intact pulses , no edema noted left leg in brace  Derm: sacral decubitus noted stage 4   PSYCH: normal mood, no depression, not anxious     ---___---___---___---___---___---     <<<  LAB AND IMAGING: >>>                          8.8    17.2  )-----------( 273      ( 13 Apr 2019 06:36 )             26.9                                                [All pertinent / recent available Imaging reports and other labs reviewed]     ---___---___---___---___---___---___ ---___---___---___---___---           <<<  A S S E S S M E N T   A N D   P L A N :  >>>  stage four  sacral decubitus  continue rectal tube . awaiting placement of wound vac .  wound care following   hyperglycdmia secondary to medication  on insulin and tapering the steroid down to 7.5 mg po   chronic pain from vertebral fracture and acute leg fracture continue medical marijuana and oxycodone prn   asthma  on budesonide   chf  on toprol and lisinopril   dementia  on namenda  supportive care   muscle spasm on tizanadine   seoquel for agiation  -GI/DVT Prophylaxis. continue with eliquis    --------------------------------------------  Case discussed with  rossy roque  Education given on     >>______________________<<      Deniz Bolden .         phone   [phone]

## 2019-04-14 LAB
ANION GAP SERPL CALC-SCNC: 11 MMOL/L — SIGNIFICANT CHANGE UP (ref 5–17)
BUN SERPL-MCNC: 33 MG/DL — HIGH (ref 7–23)
CALCIUM SERPL-MCNC: 7.9 MG/DL — LOW (ref 8.4–10.5)
CHLORIDE SERPL-SCNC: 97 MMOL/L — SIGNIFICANT CHANGE UP (ref 96–108)
CO2 SERPL-SCNC: 29 MMOL/L — SIGNIFICANT CHANGE UP (ref 22–31)
CREAT SERPL-MCNC: <0.3 MG/DL — LOW (ref 0.5–1.3)
GLUCOSE BLDC GLUCOMTR-MCNC: 190 MG/DL — HIGH (ref 70–99)
GLUCOSE BLDC GLUCOMTR-MCNC: 228 MG/DL — HIGH (ref 70–99)
GLUCOSE BLDC GLUCOMTR-MCNC: 269 MG/DL — HIGH (ref 70–99)
GLUCOSE BLDC GLUCOMTR-MCNC: 301 MG/DL — HIGH (ref 70–99)
GLUCOSE SERPL-MCNC: 234 MG/DL — HIGH (ref 70–99)
HCT VFR BLD CALC: 26.6 % — LOW (ref 34.5–45)
HGB BLD-MCNC: 9 G/DL — LOW (ref 11.5–15.5)
MCHC RBC-ENTMCNC: 32.5 PG — SIGNIFICANT CHANGE UP (ref 27–34)
MCHC RBC-ENTMCNC: 33.9 GM/DL — SIGNIFICANT CHANGE UP (ref 32–36)
MCV RBC AUTO: 96.1 FL — SIGNIFICANT CHANGE UP (ref 80–100)
PLATELET # BLD AUTO: 260 K/UL — SIGNIFICANT CHANGE UP (ref 150–400)
POTASSIUM SERPL-MCNC: 4.4 MMOL/L — SIGNIFICANT CHANGE UP (ref 3.5–5.3)
POTASSIUM SERPL-SCNC: 4.4 MMOL/L — SIGNIFICANT CHANGE UP (ref 3.5–5.3)
RBC # BLD: 2.76 M/UL — LOW (ref 3.8–5.2)
RBC # FLD: 18.1 % — HIGH (ref 10.3–14.5)
SODIUM SERPL-SCNC: 137 MMOL/L — SIGNIFICANT CHANGE UP (ref 135–145)
WBC # BLD: 18.6 K/UL — HIGH (ref 3.8–10.5)
WBC # FLD AUTO: 18.6 K/UL — HIGH (ref 3.8–10.5)

## 2019-04-14 RX ADMIN — Medication 1 DROP(S): at 14:00

## 2019-04-14 RX ADMIN — GABAPENTIN 300 MILLIGRAM(S): 400 CAPSULE ORAL at 12:37

## 2019-04-14 RX ADMIN — Medication 1 APPLICATION(S): at 14:00

## 2019-04-14 RX ADMIN — Medication 1: at 18:19

## 2019-04-14 RX ADMIN — TIZANIDINE 4 MILLIGRAM(S): 4 TABLET ORAL at 08:50

## 2019-04-14 RX ADMIN — Medication 10 MILLIGRAM(S): at 18:12

## 2019-04-14 RX ADMIN — APIXABAN 5 MILLIGRAM(S): 2.5 TABLET, FILM COATED ORAL at 05:47

## 2019-04-14 RX ADMIN — Medication 25 MILLIGRAM(S): at 05:46

## 2019-04-14 RX ADMIN — Medication 300 MILLIGRAM(S): at 14:08

## 2019-04-14 RX ADMIN — FAMOTIDINE 20 MILLIGRAM(S): 10 INJECTION INTRAVENOUS at 14:09

## 2019-04-14 RX ADMIN — Medication 1.5 MILLIGRAM(S): at 21:32

## 2019-04-14 RX ADMIN — Medication 12.5 MILLIGRAM(S): at 21:32

## 2019-04-14 RX ADMIN — Medication 12.5 MILLIGRAM(S): at 08:49

## 2019-04-14 RX ADMIN — Medication 3 MILLILITER(S): at 18:11

## 2019-04-14 RX ADMIN — Medication 25 MILLIGRAM(S): at 08:49

## 2019-04-14 RX ADMIN — Medication 3 MILLILITER(S): at 05:45

## 2019-04-14 RX ADMIN — APIXABAN 5 MILLIGRAM(S): 2.5 TABLET, FILM COATED ORAL at 17:52

## 2019-04-14 RX ADMIN — Medication 250 MILLIGRAM(S): at 11:12

## 2019-04-14 RX ADMIN — LISINOPRIL 5 MILLIGRAM(S): 2.5 TABLET ORAL at 21:32

## 2019-04-14 RX ADMIN — Medication 1 DROP(S): at 05:47

## 2019-04-14 RX ADMIN — Medication 1 APPLICATION(S): at 05:47

## 2019-04-14 RX ADMIN — QUETIAPINE FUMARATE 25 MILLIGRAM(S): 200 TABLET, FILM COATED ORAL at 21:32

## 2019-04-14 RX ADMIN — BUDESONIDE AND FORMOTEROL FUMARATE DIHYDRATE 2 PUFF(S): 160; 4.5 AEROSOL RESPIRATORY (INHALATION) at 08:47

## 2019-04-14 RX ADMIN — Medication 3 MILLILITER(S): at 23:12

## 2019-04-14 RX ADMIN — Medication 500 MILLIGRAM(S): at 14:07

## 2019-04-14 RX ADMIN — Medication 3: at 08:48

## 2019-04-14 RX ADMIN — Medication 1 DROP(S): at 22:46

## 2019-04-14 RX ADMIN — GABAPENTIN 300 MILLIGRAM(S): 400 CAPSULE ORAL at 21:32

## 2019-04-14 RX ADMIN — Medication 1 APPLICATION(S): at 21:35

## 2019-04-14 RX ADMIN — TIZANIDINE 4 MILLIGRAM(S): 4 TABLET ORAL at 21:32

## 2019-04-14 RX ADMIN — Medication 3 MILLILITER(S): at 14:05

## 2019-04-14 RX ADMIN — Medication 1 TABLET(S): at 14:09

## 2019-04-14 RX ADMIN — BUDESONIDE AND FORMOTEROL FUMARATE DIHYDRATE 2 PUFF(S): 160; 4.5 AEROSOL RESPIRATORY (INHALATION) at 21:34

## 2019-04-14 NOTE — PROGRESS NOTE ADULT - SUBJECTIVE AND OBJECTIVE BOX
---___---___---___---___---___---___ ---___---___---___---___---___---___---___---___---___---                    <<<  M E D I C A L   A T T E N D I N G    F O L L O W    U P   N O T E  >>>    - Patient laying in bed no distress today      ---___---___---___---___---___---      <<<  MEDICATIONS:  >>>    MEDICATIONS  (STANDING):  ALBUTerol    90 MICROgram(s) HFA Inhaler 1 Puff(s) Inhalation every 4 hours  ALBUTerol/ipratropium for Nebulization 3 milliLiter(s) Nebulizer every 6 hours  apixaban 5 milliGRAM(s) Oral every 12 hours  artificial tears (preservative free) Ophthalmic Solution 1 Drop(s) Both EYES three times a day  ascorbic acid 500 milliGRAM(s) Oral daily  buDESOnide 160 MICROgram(s)/formoterol 4.5 MICROgram(s) Inhaler 2 Puff(s) Inhalation two times a day  clonazePAM Tablet 1.5 milliGRAM(s) Oral <User Schedule>  dextrose 5%. 1000 milliLiter(s) (50 mL/Hr) IV Continuous <Continuous>  dextrose 50% Injectable 12.5 Gram(s) IV Push once  dextrose 50% Injectable 25 Gram(s) IV Push once  dextrose 50% Injectable 25 Gram(s) IV Push once  ergocalciferol 54213 Unit(s) Oral every week  famotidine    Tablet 20 milliGRAM(s) Oral daily  ferrous    sulfate Liquid 300 milliGRAM(s) Enteral Tube daily  gabapentin   Solution 300 milliGRAM(s) Oral two times a day  insulin lispro (HumaLOG) corrective regimen sliding scale   SubCutaneous three times a day before meals  insulin lispro (HumaLOG) corrective regimen sliding scale   SubCutaneous at bedtime  lisinopril 5 milliGRAM(s) Enteral Tube at bedtime  Medical Marijuana Oil 1 milliLiter(s) 1 milliLiter(s) Oral <User Schedule>  metoprolol tartrate 12.5 milliGRAM(s) Enteral Tube two times a day  multivitamin 1 Tablet(s) Oral daily  petrolatum white Ointment 1 Application(s) Topical three times a day  predniSONE   Tablet   Oral   predniSONE   Tablet 10 milliGRAM(s) Oral every 12 hours  QUEtiapine 25 milliGRAM(s) Oral at bedtime  sodium chloride 0.9% Bolus 500 milliLiter(s) IV Bolus once  tiotropium 18 MICROgram(s) Capsule 1 Capsule(s) Inhalation daily  tiZANidine 4 milliGRAM(s) Oral <User Schedule>  vancomycin  IVPB 1000 milliGRAM(s) IV Intermittent every 24 hours      MEDICATIONS  (PRN):  acetaminophen    Suspension .. 650 milliGRAM(s) Oral every 6 hours PRN Temp greater or equal to 38C (100.4F), Mild Pain (1 - 3), Moderate Pain (4 - 6)  dextrose 40% Gel 15 Gram(s) Oral once PRN Blood Glucose LESS THAN 70 milliGRAM(s)/deciLiter  diphenhydrAMINE   Elixir 25 milliGRAM(s) Oral every 6 hours PRN Rash and/or Itching  glucagon  Injectable 1 milliGRAM(s) IntraMuscular once PRN Glucose <70 milliGRAM(s)/deciLiter  nystatin Powder 1 Application(s) Topical two times a day PRN rash/itchiness  oxyCODONE    IR 5 milliGRAM(s) Oral every 6 hours PRN Moderate Pain (4 - 6)/Severe Pain (7 - 10)  sodium chloride 0.65% Nasal 1 Spray(s) Both Nostrils three times a day PRN Nasal Congestion       ---___---___---___---___---___---     <<<REVIEW OF SYSTEM: >>>    unable to obtain      ---___---___---___---___---___---          <<<  VITAL SIGNS: >>>    T(F): 97.9 (04-14-19 @ 04:52), Max: 99.6 (04-13-19 @ 20:46)  HR: 98 (04-14-19 @ 04:51) (98 - 101)  BP: 103/63 (04-14-19 @ 04:52) (103/63 - 146/81)  RR: 18 (04-14-19 @ 04:52) (18 - 18)  SpO2: 100% (04-14-19 @ 04:52) (99% - 100%)  Wt(kg): --  CAPILLARY BLOOD GLUCOSE      POCT Blood Glucose.: 227 mg/dL (13 Apr 2019 22:08)    I&O's Summary    13 Apr 2019 07:01  -  14 Apr 2019 07:00  --------------------------------------------------------  IN: 900 mL / OUT: 2400 mL / NET: -1500 mL         ---___---___---___---___---___---                       PHYSICAL EXAM:    GEN: A&O X 0 , NAD , comfortable  HEENT: NCAT, PERRL, MMM, no scleral icterus, hearing intact  NECK: Supple, No JVD  CVS: S1S2 , regular , No M/R/G appreciated  PULM: CTA B/L,  no W/R/R appreciated  ABD.: soft. non tender, non distended,  bowel sounds present peg noted   Extrem: intact pulses , no edema noted  Derm: lareg stage 4 sacral decubitus         ---___---___---___---___---___---     <<<  LAB AND IMAGING: >>>                          9.0    18.6  )-----------( 260      ( 14 Apr 2019 06:33 )             26.6               04-14    137  |  97  |  33<H>  ----------------------------<  234<H>  4.4   |  29  |  <0.30<L>    Ca    7.9<L>      14 Apr 2019 06:33                                 [All pertinent / recent available Imaging reports and other labs reviewed]     ---___---___---___---___---___---___ ---___---___---___---___---           <<<  DEO S S E S S M E N T   A N D   P L A N :  >>>    sacral decubitus  contineu wound care and vac suctioning   left femoral fracture supportive acre and chronic pain management   hypotensive hold bp meds follow bp  chronic mrsa infection continue vancomycin   dementia without behavioral disturbance  contiue seroquel remeron and klonopin   hyperglycemia secondary to medication  prednisone taper on board  monitor fingerstick   asthma continue budesonide   chf 20% ef  on lisinopril  agitation  contie seroqul and klonopin  medical marijuana for pain   -GI/DVT Prophylaxis. eliquis for dvt prophylaxis     --------------------------------------------  Case discussed with   Education given on     >>______________________<<      Deniz Bolden .         phone   [phone]

## 2019-04-15 DIAGNOSIS — R73.9 HYPERGLYCEMIA, UNSPECIFIED: ICD-10-CM

## 2019-04-15 LAB
ANION GAP SERPL CALC-SCNC: 11 MMOL/L — SIGNIFICANT CHANGE UP (ref 5–17)
BUN SERPL-MCNC: 30 MG/DL — HIGH (ref 7–23)
CALCIUM SERPL-MCNC: 8 MG/DL — LOW (ref 8.4–10.5)
CHLORIDE SERPL-SCNC: 93 MMOL/L — LOW (ref 96–108)
CO2 SERPL-SCNC: 28 MMOL/L — SIGNIFICANT CHANGE UP (ref 22–31)
CREAT SERPL-MCNC: <0.3 MG/DL — LOW (ref 0.5–1.3)
GLUCOSE BLDC GLUCOMTR-MCNC: 108 MG/DL — HIGH (ref 70–99)
GLUCOSE BLDC GLUCOMTR-MCNC: 128 MG/DL — HIGH (ref 70–99)
GLUCOSE BLDC GLUCOMTR-MCNC: 165 MG/DL — HIGH (ref 70–99)
GLUCOSE BLDC GLUCOMTR-MCNC: 179 MG/DL — HIGH (ref 70–99)
GLUCOSE SERPL-MCNC: 155 MG/DL — HIGH (ref 70–99)
HCT VFR BLD CALC: 27.5 % — LOW (ref 34.5–45)
HGB BLD-MCNC: 9.3 G/DL — LOW (ref 11.5–15.5)
MCHC RBC-ENTMCNC: 32.2 PG — SIGNIFICANT CHANGE UP (ref 27–34)
MCHC RBC-ENTMCNC: 33.7 GM/DL — SIGNIFICANT CHANGE UP (ref 32–36)
MCV RBC AUTO: 95.3 FL — SIGNIFICANT CHANGE UP (ref 80–100)
PLATELET # BLD AUTO: 281 K/UL — SIGNIFICANT CHANGE UP (ref 150–400)
POTASSIUM SERPL-MCNC: 4.5 MMOL/L — SIGNIFICANT CHANGE UP (ref 3.5–5.3)
POTASSIUM SERPL-SCNC: 4.5 MMOL/L — SIGNIFICANT CHANGE UP (ref 3.5–5.3)
RBC # BLD: 2.88 M/UL — LOW (ref 3.8–5.2)
RBC # FLD: 18.4 % — HIGH (ref 10.3–14.5)
SODIUM SERPL-SCNC: 132 MMOL/L — LOW (ref 135–145)
WBC # BLD: 18.8 K/UL — HIGH (ref 3.8–10.5)
WBC # FLD AUTO: 18.8 K/UL — HIGH (ref 3.8–10.5)

## 2019-04-15 PROCEDURE — 99232 SBSQ HOSP IP/OBS MODERATE 35: CPT

## 2019-04-15 PROCEDURE — 99222 1ST HOSP IP/OBS MODERATE 55: CPT | Mod: GC

## 2019-04-15 RX ORDER — MULTIVIT-MIN/FERROUS GLUCONATE 9 MG/15 ML
1 LIQUID (ML) ORAL DAILY
Qty: 0 | Refills: 0 | Status: DISCONTINUED | OUTPATIENT
Start: 2019-04-15 | End: 2019-05-06

## 2019-04-15 RX ADMIN — GABAPENTIN 300 MILLIGRAM(S): 400 CAPSULE ORAL at 09:24

## 2019-04-15 RX ADMIN — Medication 1 DROP(S): at 21:38

## 2019-04-15 RX ADMIN — Medication 25 MILLIGRAM(S): at 09:24

## 2019-04-15 RX ADMIN — Medication 12.5 MILLIGRAM(S): at 09:25

## 2019-04-15 RX ADMIN — FAMOTIDINE 20 MILLIGRAM(S): 10 INJECTION INTRAVENOUS at 13:37

## 2019-04-15 RX ADMIN — Medication 10 MILLIGRAM(S): at 21:37

## 2019-04-15 RX ADMIN — Medication 1 DROP(S): at 13:36

## 2019-04-15 RX ADMIN — BUDESONIDE AND FORMOTEROL FUMARATE DIHYDRATE 2 PUFF(S): 160; 4.5 AEROSOL RESPIRATORY (INHALATION) at 09:23

## 2019-04-15 RX ADMIN — Medication 1 APPLICATION(S): at 09:44

## 2019-04-15 RX ADMIN — BUDESONIDE AND FORMOTEROL FUMARATE DIHYDRATE 2 PUFF(S): 160; 4.5 AEROSOL RESPIRATORY (INHALATION) at 21:39

## 2019-04-15 RX ADMIN — Medication 500 MILLIGRAM(S): at 13:35

## 2019-04-15 RX ADMIN — Medication 1.5 MILLIGRAM(S): at 21:40

## 2019-04-15 RX ADMIN — Medication 1 DROP(S): at 09:24

## 2019-04-15 RX ADMIN — TIZANIDINE 4 MILLIGRAM(S): 4 TABLET ORAL at 21:37

## 2019-04-15 RX ADMIN — QUETIAPINE FUMARATE 25 MILLIGRAM(S): 200 TABLET, FILM COATED ORAL at 21:37

## 2019-04-15 RX ADMIN — APIXABAN 5 MILLIGRAM(S): 2.5 TABLET, FILM COATED ORAL at 06:14

## 2019-04-15 RX ADMIN — GABAPENTIN 300 MILLIGRAM(S): 400 CAPSULE ORAL at 21:38

## 2019-04-15 RX ADMIN — Medication 300 MILLIGRAM(S): at 13:35

## 2019-04-15 RX ADMIN — Medication 1: at 13:34

## 2019-04-15 RX ADMIN — Medication 3 MILLILITER(S): at 13:34

## 2019-04-15 RX ADMIN — Medication 1: at 09:43

## 2019-04-15 RX ADMIN — Medication 1 APPLICATION(S): at 13:38

## 2019-04-15 RX ADMIN — Medication 3 MILLILITER(S): at 18:10

## 2019-04-15 RX ADMIN — APIXABAN 5 MILLIGRAM(S): 2.5 TABLET, FILM COATED ORAL at 18:06

## 2019-04-15 RX ADMIN — LISINOPRIL 5 MILLIGRAM(S): 2.5 TABLET ORAL at 21:36

## 2019-04-15 RX ADMIN — Medication 3 MILLILITER(S): at 09:20

## 2019-04-15 RX ADMIN — Medication 12.5 MILLIGRAM(S): at 21:36

## 2019-04-15 RX ADMIN — TIZANIDINE 4 MILLIGRAM(S): 4 TABLET ORAL at 09:24

## 2019-04-15 RX ADMIN — ERGOCALCIFEROL 50000 UNIT(S): 1.25 CAPSULE ORAL at 13:36

## 2019-04-15 RX ADMIN — Medication 10 MILLIGRAM(S): at 06:14

## 2019-04-15 RX ADMIN — Medication 250 MILLIGRAM(S): at 12:02

## 2019-04-15 NOTE — CONSULT NOTE ADULT - PROBLEM SELECTOR RECOMMENDATION 9
-Patient with steroid induced hyperglycemia  -Last A1C 5.3 in Feb 2019. Can recheck A1C.  -Was previously on hydrocortisone, now tapered to prednisone 10mg daily, with plan to taper to home dose of 7.5mg daily.   -FS currently well controlled just on humalog correctional. Can c/w current regimen  -For dc, can resume metformin 500mg daily.  -Would prescribe patient a glucometer and supplies when dc home and patient's  needs education by bedside nurse on use.

## 2019-04-15 NOTE — PROGRESS NOTE ADULT - SUBJECTIVE AND OBJECTIVE BOX
infectious diseases progress note:    Patient is a 68y old  Female who presents with a chief complaint of fevers (14 Apr 2019 07:57)        Fever           Allergies    ASA; dye contrast (Anaphylaxis)  aspirin (Short breath)  divalproex sodium (Other (Unknown))  Flowers and Plants (Short breath)  Haldol (Other (Unknown))  penicillin (Short breath; Rash)  sulfa drugs (Short breath; Rash)  vancomycin (Rash; Urticaria; Hives)  Xanax (Other (Unknown))    Intolerances        ANTIBIOTICS/RELEVANT:  antimicrobials  vancomycin  IVPB 1000 milliGRAM(s) IV Intermittent every 24 hours    immunologic:    OTHER:  acetaminophen    Suspension .. 650 milliGRAM(s) Oral every 6 hours PRN  ALBUTerol    90 MICROgram(s) HFA Inhaler 1 Puff(s) Inhalation every 4 hours  ALBUTerol/ipratropium for Nebulization 3 milliLiter(s) Nebulizer every 6 hours  apixaban 5 milliGRAM(s) Oral every 12 hours  artificial tears (preservative free) Ophthalmic Solution 1 Drop(s) Both EYES three times a day  ascorbic acid 500 milliGRAM(s) Oral daily  buDESOnide 160 MICROgram(s)/formoterol 4.5 MICROgram(s) Inhaler 2 Puff(s) Inhalation two times a day  clonazePAM Tablet 1.5 milliGRAM(s) Oral <User Schedule>  dextrose 40% Gel 15 Gram(s) Oral once PRN  dextrose 5%. 1000 milliLiter(s) IV Continuous <Continuous>  dextrose 50% Injectable 12.5 Gram(s) IV Push once  dextrose 50% Injectable 25 Gram(s) IV Push once  dextrose 50% Injectable 25 Gram(s) IV Push once  diphenhydrAMINE   Elixir 25 milliGRAM(s) Oral every 6 hours PRN  ergocalciferol 99463 Unit(s) Oral every week  famotidine    Tablet 20 milliGRAM(s) Oral daily  ferrous    sulfate Liquid 300 milliGRAM(s) Enteral Tube daily  gabapentin   Solution 300 milliGRAM(s) Oral two times a day  glucagon  Injectable 1 milliGRAM(s) IntraMuscular once PRN  insulin lispro (HumaLOG) corrective regimen sliding scale   SubCutaneous three times a day before meals  insulin lispro (HumaLOG) corrective regimen sliding scale   SubCutaneous at bedtime  lisinopril 5 milliGRAM(s) Enteral Tube at bedtime  Medical Marijuana Oil 1 milliLiter(s) 1 milliLiter(s) Oral <User Schedule>  metoprolol tartrate 12.5 milliGRAM(s) Enteral Tube two times a day  multivitamin 1 Tablet(s) Oral daily  nystatin Powder 1 Application(s) Topical two times a day PRN  oxyCODONE    IR 5 milliGRAM(s) Oral every 6 hours PRN  petrolatum white Ointment 1 Application(s) Topical three times a day  predniSONE   Tablet   Oral   predniSONE   Tablet 10 milliGRAM(s) Oral every 12 hours  QUEtiapine 25 milliGRAM(s) Oral at bedtime  sodium chloride 0.65% Nasal 1 Spray(s) Both Nostrils three times a day PRN  sodium chloride 0.9% Bolus 500 milliLiter(s) IV Bolus once  tiotropium 18 MICROgram(s) Capsule 1 Capsule(s) Inhalation daily  tiZANidine 4 milliGRAM(s) Oral <User Schedule>      Objective:  Vital Signs Last 24 Hrs  T(C): 36.3 (15 Apr 2019 04:23), Max: 36.8 (14 Apr 2019 11:59)  T(F): 97.4 (15 Apr 2019 04:23), Max: 98.2 (14 Apr 2019 11:59)  HR: 97 (15 Apr 2019 04:23) (88 - 99)  BP: 135/71 (15 Apr 2019 04:23) (115/69 - 154/72)  BP(mean): --  RR: 18 (15 Apr 2019 04:23) (18 - 19)  SpO2: 98% (15 Apr 2019 04:23) (95% - 100%)    PHYSICAL EXAM:      Eyes:JACQUELIN, EOMI  Ear/Nose/Throat: no oral lesion, no sinus tenderness on percussion	  Neck:no JVD, no lymphadenopathy, supple  Respiratory: CTA maryjane  Cardiovascular: S1S2 RRR, no murmurs  Gastrointestinal:soft, (+) BS, no HSM  Extremities:no e/e/c        LABS:                        9.3    18.8  )-----------( 281      ( 15 Apr 2019 06:22 )             27.5     04-15    132<L>  |  93<L>  |  30<H>  ----------------------------<  155<H>  4.5   |  28  |  <0.30<L>    Ca    8.0<L>      15 Apr 2019 06:22              MICROBIOLOGY:    RECENT CULTURES:  04-08 @ 09:01 .Urine Catheterized                <10,000 CFU/mL Normal Urogenital Olive          RESPIRATORY CULTURES:              RADIOLOGY & ADDITIONAL STUDIES:        Pager [phone]  After 5 pm/weekends or if no response :[phone]

## 2019-04-15 NOTE — CONSULT NOTE ADULT - SUBJECTIVE AND OBJECTIVE BOX
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 History was provided by patient's daughters, Tere and Angie at bedside. Patient has been having persistent fever at home up to 103F. She has appeared more lethargic for the last 2 days and has had labored breathing, but no cough. She has loose nonbloody stools. No recent emesis, melena or hematuria. Urine output via gavin catheter is normal yellow appearance and had "good output." Patient gets regular wound care but has persistent ulcers. Because of persistent fevers, pt was brought to ED.     This patient was previously hospitalized from 2/26-3/18/2019 for fevers, found to have septic shock 2/2 UTI vs sacral ulcer vs PNA, requiring pressor support and MICU stay. 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 Offer was made to change the spacer and get cultures, however at that time, the  did not want to put the patient through surgery. Pt completed a course of meropenem, and was resumed on suppressive minocycline.       Endocrine History:  Hx as per  and bedside.     reports patient first seen for hyperglycemia during last hospitalization in Oct 2018. was seen by endo team at that time inpatient. Was seen for steroid induced hyperglycemia. Reports being on chronic prednisone for asthma for >5 years. On prednsione 7.5- 10mg daily. Every few months has to have increased dose steroid tapers for exacerbations. On tube feeds at home. On last hosp was dc to rehab on continuous tube feeds on NPH 8 units qAM. Since lat hosp, now on bolus feeds, starting at 8AM 1 can of glucerna every 4 hrs, for a max of 4 cans a day. Has been off insulin since dc from rehab, and currently only on metformin 500mg daily. A1C 5.3 Feb 2019.  As per  has never been told of DM dx before, No family hx of diabetes. On prednsione 7.5mg daily currently. Does not check glucose at home.        PAST MEDICAL & SURGICAL HISTORY:  CVA (cerebral vascular accident)  Fatty pancreas  PNA (pneumonia)  Pulmonary HTN  IGT (impaired glucose tolerance)  Ulcerative colitis  Acid reflux  Anxiety  Depression  Mouth sores  HLD (hyperlipidemia)  Asthma  Humeral head fracture  H/O: hysterectomy  H/O cataract extraction, left  History of knee replacement      FAMILY HISTORY:  Family history of dementia (Grandparent)  Family history of colon cancer (Grandparent)  No family hx of DM      Social History: no hx of smoking, alcohol use    Outpatient Medications:  metformin 500mg daily    MEDICATIONS  (STANDING):  ALBUTerol    90 MICROgram(s) HFA Inhaler 1 Puff(s) Inhalation every 4 hours  ALBUTerol/ipratropium for Nebulization 3 milliLiter(s) Nebulizer every 6 hours  apixaban 5 milliGRAM(s) Oral every 12 hours  artificial tears (preservative free) Ophthalmic Solution 1 Drop(s) Both EYES three times a day  ascorbic acid 500 milliGRAM(s) Oral daily  buDESOnide 160 MICROgram(s)/formoterol 4.5 MICROgram(s) Inhaler 2 Puff(s) Inhalation two times a day  clonazePAM Tablet 1.5 milliGRAM(s) Oral <User Schedule>  dextrose 5%. 1000 milliLiter(s) (50 mL/Hr) IV Continuous <Continuous>  dextrose 50% Injectable 12.5 Gram(s) IV Push once  dextrose 50% Injectable 25 Gram(s) IV Push once  dextrose 50% Injectable 25 Gram(s) IV Push once  ergocalciferol 29400 Unit(s) Oral every week  famotidine    Tablet 20 milliGRAM(s) Oral daily  ferrous    sulfate Liquid 300 milliGRAM(s) Enteral Tube daily  gabapentin   Solution 300 milliGRAM(s) Oral two times a day  insulin lispro (HumaLOG) corrective regimen sliding scale   SubCutaneous three times a day before meals  insulin lispro (HumaLOG) corrective regimen sliding scale   SubCutaneous at bedtime  lisinopril 5 milliGRAM(s) Enteral Tube at bedtime  Medical Marijuana Oil 1 milliLiter(s) 1 milliLiter(s) Oral <User Schedule>  metoprolol tartrate 12.5 milliGRAM(s) Enteral Tube two times a day  multivitamin/minerals 1 Tablet(s) Oral daily  petrolatum white Ointment 1 Application(s) Topical three times a day  predniSONE   Tablet   Oral   predniSONE   Tablet 10 milliGRAM(s) Oral every 12 hours  QUEtiapine 25 milliGRAM(s) Oral at bedtime  sodium chloride 0.9% Bolus 500 milliLiter(s) IV Bolus once  tiotropium 18 MICROgram(s) Capsule 1 Capsule(s) Inhalation daily  tiZANidine 4 milliGRAM(s) Oral <User Schedule>  vancomycin  IVPB 1000 milliGRAM(s) IV Intermittent every 24 hours    MEDICATIONS  (PRN):  acetaminophen    Suspension .. 650 milliGRAM(s) Oral every 6 hours PRN Temp greater or equal to 38C (100.4F), Mild Pain (1 - 3), Moderate Pain (4 - 6)  dextrose 40% Gel 15 Gram(s) Oral once PRN Blood Glucose LESS THAN 70 milliGRAM(s)/deciLiter  diphenhydrAMINE   Elixir 25 milliGRAM(s) Oral every 6 hours PRN Rash and/or Itching  glucagon  Injectable 1 milliGRAM(s) IntraMuscular once PRN Glucose <70 milliGRAM(s)/deciLiter  nystatin Powder 1 Application(s) Topical two times a day PRN rash/itchiness  oxyCODONE    IR 5 milliGRAM(s) Oral every 6 hours PRN Moderate Pain (4 - 6)/Severe Pain (7 - 10)  sodium chloride 0.65% Nasal 1 Spray(s) Both Nostrils three times a day PRN Nasal Congestion      Allergies    ASA; dye contrast (Anaphylaxis)  aspirin (Short breath)  divalproex sodium (Other (Unknown))  Flowers and Plants (Short breath)  Haldol (Other (Unknown))  penicillin (Short breath; Rash)  sulfa drugs (Short breath; Rash)  vancomycin (Rash; Urticaria; Hives)  Xanax (Other (Unknown))    Intolerances      Review of Systems:      UNABLE TO OBTAIN    PHYSICAL EXAM:  VITALS: T(C): 36.6 (04-15-19 @ 12:18)  T(F): 97.9 (04-15-19 @ 12:18), Max: 98.2 (04-14-19 @ 21:06)  HR: 99 (04-15-19 @ 12:18) (88 - 99)  BP: 115/74 (04-15-19 @ 12:18) (115/69 - 135/71)  RR:  (18 - 19)  SpO2:  (95% - 100%)  Wt(kg): --  GENERAL: NAD, well-groomed, chronically ill appearing, thin  EYES: No proptosis, no lid lag, anicteric  HEENT:  Atraumatic, Normocephalic, moist mucous membranes  THYROID: Normal size, no palpable nodules  RESPIRATORY: Clear to auscultation bilaterally; No rales, rhonchi, wheezing  CARDIOVASCULAR: Regular rate and rhythm; No murmurs; no peripheral edema  GI: Soft, nontender, non distended, normal bowel sounds, PEG  SKIN: Dry, intact, No rashes or lesions  PSYCH: Alert and oriented x 0    POCT Blood Glucose.: 179 mg/dL (04-15-19 @ 12:37)  POCT Blood Glucose.: 165 mg/dL (04-15-19 @ 09:17)  POCT Blood Glucose.: 228 mg/dL (04-14-19 @ 21:31)  POCT Blood Glucose.: 190 mg/dL (04-14-19 @ 18:01)  POCT Blood Glucose.: 301 mg/dL (04-14-19 @ 12:59)  POCT Blood Glucose.: 269 mg/dL (04-14-19 @ 08:31)  POCT Blood Glucose.: 227 mg/dL (04-13-19 @ 22:08)  POCT Blood Glucose.: 187 mg/dL (04-13-19 @ 17:54)  POCT Blood Glucose.: 272 mg/dL (04-13-19 @ 13:37)  POCT Blood Glucose.: 259 mg/dL (04-13-19 @ 09:24)                            9.3    18.8  )-----------( 281      ( 15 Apr 2019 06:22 )             27.5       04-15    132<L>  |  93<L>  |  30<H>  ----------------------------<  155<H>  4.5   |  28  |  <0.30<L>    EGFR if : 136  EGFR if non : 118    Ca    8.0<L>      04-15          Hemoglobin A1C, Whole Blood: 5.3 % [4.0 - 5.6] (02-27-19 @ 20:13) HPI:  This patient is a 68yoF with PMH of advanced dementia (nonverbal, dysphagia with PEG tube, functional quadriplegic, chronic gavin catheter), sacral stage 4 pressure ulcer, heel pressure ulcers, asthma on chronic prednisone., hLd, CVA, UC (in remission for 30years), 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 History was provided by patient's daughters, Tere and Angie at bedside. Patient has been having persistent fever at home up to 103F. She has appeared more lethargic for the last 2 days and has had labored breathing, but no cough. She has loose nonbloody stools. No recent emesis, melena or hematuria. Urine output via gavin catheter is normal yellow appearance and had "good output." Patient gets regular wound care but has persistent ulcers. Because of persistent fevers, pt was brought to ED.     This patient was previously hospitalized from 2/26-3/18/2019 for fevers, found to have septic shock 2/2 UTI vs sacral ulcer vs PNA, requiring pressor support and MICU stay. 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 Offer was made to change the spacer and get cultures, however at that time, the  did not want to put the patient through surgery. Pt completed a course of meropenem, and was resumed on suppressive minocycline.       Endocrine History:  Hx as per  and bedside.     reports patient first seen for hyperglycemia during last hospitalization in Oct 2018. was seen by endo team at that time inpatient. Was seen for steroid induced hyperglycemia. Reports being on chronic prednisone for asthma for >5 years. On prednsione 7.5- 10mg daily. Every few months has to have increased dose steroid tapers for exacerbations. On tube feeds at home. On last hosp was dc to rehab on continuous tube feeds on NPH 8 units qAM. Since lat hosp, now on bolus feeds, starting at 8AM 1 can of glucerna every 4 hrs, for a max of 4 cans a day. Has been off insulin since dc from rehab, and currently only on metformin 500mg daily. A1C 5.3 Feb 2019.  As per  has never been told of DM dx before, No family hx of diabetes. On prednsione 7.5mg daily currently. Does not check glucose at home.        PAST MEDICAL & SURGICAL HISTORY:  CVA (cerebral vascular accident)  Fatty pancreas  PNA (pneumonia)  Pulmonary HTN  IGT (impaired glucose tolerance)  Ulcerative colitis  Acid reflux  Anxiety  Depression  Mouth sores  HLD (hyperlipidemia)  Asthma  Humeral head fracture  H/O: hysterectomy  H/O cataract extraction, left  History of knee replacement      FAMILY HISTORY:  Family history of dementia (Grandparent)  Family history of colon cancer (Grandparent)  No family hx of DM      Social History: no hx of smoking, alcohol use    Outpatient Medications:  metformin 500mg daily    MEDICATIONS  (STANDING):  ALBUTerol    90 MICROgram(s) HFA Inhaler 1 Puff(s) Inhalation every 4 hours  ALBUTerol/ipratropium for Nebulization 3 milliLiter(s) Nebulizer every 6 hours  apixaban 5 milliGRAM(s) Oral every 12 hours  artificial tears (preservative free) Ophthalmic Solution 1 Drop(s) Both EYES three times a day  ascorbic acid 500 milliGRAM(s) Oral daily  buDESOnide 160 MICROgram(s)/formoterol 4.5 MICROgram(s) Inhaler 2 Puff(s) Inhalation two times a day  clonazePAM Tablet 1.5 milliGRAM(s) Oral <User Schedule>  dextrose 5%. 1000 milliLiter(s) (50 mL/Hr) IV Continuous <Continuous>  dextrose 50% Injectable 12.5 Gram(s) IV Push once  dextrose 50% Injectable 25 Gram(s) IV Push once  dextrose 50% Injectable 25 Gram(s) IV Push once  ergocalciferol 14848 Unit(s) Oral every week  famotidine    Tablet 20 milliGRAM(s) Oral daily  ferrous    sulfate Liquid 300 milliGRAM(s) Enteral Tube daily  gabapentin   Solution 300 milliGRAM(s) Oral two times a day  insulin lispro (HumaLOG) corrective regimen sliding scale   SubCutaneous three times a day before meals  insulin lispro (HumaLOG) corrective regimen sliding scale   SubCutaneous at bedtime  lisinopril 5 milliGRAM(s) Enteral Tube at bedtime  Medical Marijuana Oil 1 milliLiter(s) 1 milliLiter(s) Oral <User Schedule>  metoprolol tartrate 12.5 milliGRAM(s) Enteral Tube two times a day  multivitamin/minerals 1 Tablet(s) Oral daily  petrolatum white Ointment 1 Application(s) Topical three times a day  predniSONE   Tablet   Oral   predniSONE   Tablet 10 milliGRAM(s) Oral every 12 hours  QUEtiapine 25 milliGRAM(s) Oral at bedtime  sodium chloride 0.9% Bolus 500 milliLiter(s) IV Bolus once  tiotropium 18 MICROgram(s) Capsule 1 Capsule(s) Inhalation daily  tiZANidine 4 milliGRAM(s) Oral <User Schedule>  vancomycin  IVPB 1000 milliGRAM(s) IV Intermittent every 24 hours    MEDICATIONS  (PRN):  acetaminophen    Suspension .. 650 milliGRAM(s) Oral every 6 hours PRN Temp greater or equal to 38C (100.4F), Mild Pain (1 - 3), Moderate Pain (4 - 6)  dextrose 40% Gel 15 Gram(s) Oral once PRN Blood Glucose LESS THAN 70 milliGRAM(s)/deciLiter  diphenhydrAMINE   Elixir 25 milliGRAM(s) Oral every 6 hours PRN Rash and/or Itching  glucagon  Injectable 1 milliGRAM(s) IntraMuscular once PRN Glucose <70 milliGRAM(s)/deciLiter  nystatin Powder 1 Application(s) Topical two times a day PRN rash/itchiness  oxyCODONE    IR 5 milliGRAM(s) Oral every 6 hours PRN Moderate Pain (4 - 6)/Severe Pain (7 - 10)  sodium chloride 0.65% Nasal 1 Spray(s) Both Nostrils three times a day PRN Nasal Congestion      Allergies    ASA; dye contrast (Anaphylaxis)  aspirin (Short breath)  divalproex sodium (Other (Unknown))  Flowers and Plants (Short breath)  Haldol (Other (Unknown))  penicillin (Short breath; Rash)  sulfa drugs (Short breath; Rash)  vancomycin (Rash; Urticaria; Hives)  Xanax (Other (Unknown))        Review of Systems:  UNABLE TO OBTAIN    PHYSICAL EXAM:  VITALS: T(C): 36.6 (04-15-19 @ 12:18)  T(F): 97.9 (04-15-19 @ 12:18), Max: 98.2 (04-14-19 @ 21:06)  HR: 99 (04-15-19 @ 12:18) (88 - 99)  BP: 115/74 (04-15-19 @ 12:18) (115/69 - 135/71)  RR:  (18 - 19)  SpO2:  (95% - 100%)  Wt(kg): --  GENERAL: NAD, well-groomed, chronically ill appearing, thin  EYES: No proptosis, no lid lag, anicteric  HEENT:  Atraumatic, Normocephalic, moist mucous membranes  RESPIRATORY: Clear to auscultation bilaterally; No rales, rhonchi, wheezing  CARDIOVASCULAR: Regular rate and rhythm; No murmurs; no peripheral edema  GI: Soft, nontender, non distended, normal bowel sounds, PEG  SKIN: Dry, intact, No rashes or lesions  PSYCH: Alert and oriented x 0    POCT Blood Glucose.: 179 mg/dL (04-15-19 @ 12:37)  POCT Blood Glucose.: 165 mg/dL (04-15-19 @ 09:17)  POCT Blood Glucose.: 228 mg/dL (04-14-19 @ 21:31)  POCT Blood Glucose.: 190 mg/dL (04-14-19 @ 18:01)  POCT Blood Glucose.: 301 mg/dL (04-14-19 @ 12:59)  POCT Blood Glucose.: 269 mg/dL (04-14-19 @ 08:31)  POCT Blood Glucose.: 227 mg/dL (04-13-19 @ 22:08)  POCT Blood Glucose.: 187 mg/dL (04-13-19 @ 17:54)  POCT Blood Glucose.: 272 mg/dL (04-13-19 @ 13:37)  POCT Blood Glucose.: 259 mg/dL (04-13-19 @ 09:24)                            9.3    18.8  )-----------( 281      ( 15 Apr 2019 06:22 )             27.5       04-15    132<L>  |  93<L>  |  30<H>  ----------------------------<  155<H>  4.5   |  28  |  <0.30<L>    EGFR if : 136  EGFR if non : 118    Ca    8.0<L>      04-15          Hemoglobin A1C, Whole Blood: 5.3 % [4.0 - 5.6] (02-27-19 @ 20:13)

## 2019-04-15 NOTE — PROGRESS NOTE ADULT - SUBJECTIVE AND OBJECTIVE BOX
---___---___---___---___---___---___ ---___---___---___---___---___---___---___---___---___---                    <<<  M E D I C A L   A T T E N D I N G    F O L L O W    U P   N O T E  >>>    remains afebrile and overall doing better clinically      ---___---___---___---___---___---      <<<  MEDICATIONS:  >>>    MEDICATIONS  (STANDING):  ALBUTerol    90 MICROgram(s) HFA Inhaler 1 Puff(s) Inhalation every 4 hours  ALBUTerol/ipratropium for Nebulization 3 milliLiter(s) Nebulizer every 6 hours  apixaban 5 milliGRAM(s) Oral every 12 hours  artificial tears (preservative free) Ophthalmic Solution 1 Drop(s) Both EYES three times a day  ascorbic acid 500 milliGRAM(s) Oral daily  buDESOnide 160 MICROgram(s)/formoterol 4.5 MICROgram(s) Inhaler 2 Puff(s) Inhalation two times a day  clonazePAM Tablet 1.5 milliGRAM(s) Oral <User Schedule>  dextrose 5%. 1000 milliLiter(s) (50 mL/Hr) IV Continuous <Continuous>  dextrose 50% Injectable 12.5 Gram(s) IV Push once  dextrose 50% Injectable 25 Gram(s) IV Push once  dextrose 50% Injectable 25 Gram(s) IV Push once  ergocalciferol 14700 Unit(s) Oral every week  famotidine    Tablet 20 milliGRAM(s) Oral daily  ferrous    sulfate Liquid 300 milliGRAM(s) Enteral Tube daily  gabapentin   Solution 300 milliGRAM(s) Oral two times a day  insulin lispro (HumaLOG) corrective regimen sliding scale   SubCutaneous three times a day before meals  insulin lispro (HumaLOG) corrective regimen sliding scale   SubCutaneous at bedtime  lisinopril 5 milliGRAM(s) Enteral Tube at bedtime  Medical Marijuana Oil 1 milliLiter(s) 1 milliLiter(s) Oral <User Schedule>  metoprolol tartrate 12.5 milliGRAM(s) Enteral Tube two times a day  multivitamin 1 Tablet(s) Oral daily  petrolatum white Ointment 1 Application(s) Topical three times a day  predniSONE   Tablet   Oral   predniSONE   Tablet 10 milliGRAM(s) Oral every 12 hours  QUEtiapine 25 milliGRAM(s) Oral at bedtime  sodium chloride 0.9% Bolus 500 milliLiter(s) IV Bolus once  tiotropium 18 MICROgram(s) Capsule 1 Capsule(s) Inhalation daily  tiZANidine 4 milliGRAM(s) Oral <User Schedule>  vancomycin  IVPB 1000 milliGRAM(s) IV Intermittent every 24 hours      MEDICATIONS  (PRN):  acetaminophen    Suspension .. 650 milliGRAM(s) Oral every 6 hours PRN Temp greater or equal to 38C (100.4F), Mild Pain (1 - 3), Moderate Pain (4 - 6)  dextrose 40% Gel 15 Gram(s) Oral once PRN Blood Glucose LESS THAN 70 milliGRAM(s)/deciLiter  diphenhydrAMINE   Elixir 25 milliGRAM(s) Oral every 6 hours PRN Rash and/or Itching  glucagon  Injectable 1 milliGRAM(s) IntraMuscular once PRN Glucose <70 milliGRAM(s)/deciLiter  nystatin Powder 1 Application(s) Topical two times a day PRN rash/itchiness  oxyCODONE    IR 5 milliGRAM(s) Oral every 6 hours PRN Moderate Pain (4 - 6)/Severe Pain (7 - 10)  sodium chloride 0.65% Nasal 1 Spray(s) Both Nostrils three times a day PRN Nasal Congestion       ---___---___---___---___---___---     <<<REVIEW OF SYSTEM: >>>    unable to obtain      ---___---___---___---___---___---          <<<  VITAL SIGNS: >>>    T(F): 97.4 (04-15-19 @ 04:23), Max: 98.2 (04-14-19 @ 11:59)  HR: 97 (04-15-19 @ 04:23) (88 - 99)  BP: 135/71 (04-15-19 @ 04:23) (115/69 - 154/72)  RR: 18 (04-15-19 @ 04:23) (18 - 19)  SpO2: 98% (04-15-19 @ 04:23) (95% - 100%)  Wt(kg): --  CAPILLARY BLOOD GLUCOSE      POCT Blood Glucose.: 228 mg/dL (14 Apr 2019 21:31)    I&O's Summary    14 Apr 2019 07:01  -  15 Apr 2019 07:00  --------------------------------------------------------  IN: 0 mL / OUT: 800 mL / NET: -800 mL         ---___---___---___---___---___---                       PHYSICAL EXAM:    GEN: A&O X 0 , NAD , comfortable  HEENT: NCAT, PERRL, MMM, no scleral icterus, hearing intact  NECK: Supple, No JVD  CVS: S1S2 , regular , No M/R/G appreciated  PULM: CTA B/L,  no W/R/R appreciated  ABD.: soft. non tender, non distended,  bowel sounds present peg noted   Extrem: intact pulses , no edema noted  Derm: sacral decubitus noted         ---___---___---___---___---___---     <<<  LAB AND IMAGING: >>>                          9.3    18.8  )-----------( 281      ( 15 Apr 2019 06:22 )             27.5               04-15    132<L>  |  93<L>  |  30<H>  ----------------------------<  155<H>  4.5   |  28  |  <0.30<L>    Ca    8.0<L>      15 Apr 2019 06:22                                 [All pertinent / recent available Imaging reports and other labs reviewed]     ---___---___---___---___---___---___ ---___---___---___---___---           <<<  A S S E S S M E N T   A N D   P L A N :  >>>         chronic mrsa infection continue vancomycin   dementia without behavioral disturbance  contiue seroquel remeron and klonopin   hyperglycemia secondary to medication  prednisone taper on board  monitor fingerstick   asthma continue budesonide   chf 20% ef  on lisinopril  agitation  contie seroqul and klonopin  medical marijuana for pain   -GI/DVT Prophylaxis. eliquis for dvt prophylaxis     -GI/DVT Prophylaxis.    --------------------------------------------  Case discussed with   Education given on     >>______________________<<      Deniz Bolden .         phone   [phone]

## 2019-04-15 NOTE — PROGRESS NOTE ADULT - ASSESSMENT
68 yr old with advanced dementia , bed bound, contracted, cva, sp removal of infected hip due to mrsa with placement of space last fall.   Pt has been havening fevers for months.  I suspect there is ongoing Om of the hip site but changing the spacer is not a realistic option . chronic Om of the sacrum usually does not cause fevers and does not need prolonged ab therapy .       cultures negative to date  uc with yeast of doubtful significance    vanco to cover mrsa  can dc meropenem  after cultures , we cac decide on endpoint of ab therapy   discussed  with      reluctant to rescan   does not want mri  temp is down on vanco    if   no good options  discussed with   pathologic fracture noted due to contractures and osteoporosis      plan several weeks of vanco realizing she may still have fever.   Discussed with  in detail   to be discharged  discussed with care coordinator  level is low but this is for suppression not cure   .

## 2019-04-15 NOTE — CHART NOTE - NSCHARTNOTEFT_GEN_A_CORE
pt BM's still pasty at this time as per nurse.  is concerned about pt hair falling out. spoke to NP regarding increasing banatrol to 4 packs daily and change multivitamin to multivitamin with minerals.   Provider to RN. Banatrol 4 packs daily. Mix 1/2 pack of Banantrol with 120ml H20. One hour later mix other 1/2 pack with 120ml H20.   provide each initial 1/2 pack at 8:30am, 12:30pm, 4:40pm and 8:30pm along with bolus feeds.

## 2019-04-15 NOTE — CONSULT NOTE ADULT - ASSESSMENT
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 Endocrine Consulted for steroid induced hyperglycemia.

## 2019-04-16 LAB
ANION GAP SERPL CALC-SCNC: 13 MMOL/L — SIGNIFICANT CHANGE UP (ref 5–17)
BUN SERPL-MCNC: 34 MG/DL — HIGH (ref 7–23)
CALCIUM SERPL-MCNC: 8.1 MG/DL — LOW (ref 8.4–10.5)
CHLORIDE SERPL-SCNC: 89 MMOL/L — LOW (ref 96–108)
CO2 SERPL-SCNC: 24 MMOL/L — SIGNIFICANT CHANGE UP (ref 22–31)
CREAT SERPL-MCNC: <0.3 MG/DL — LOW (ref 0.5–1.3)
GLUCOSE BLDC GLUCOMTR-MCNC: 106 MG/DL — HIGH (ref 70–99)
GLUCOSE BLDC GLUCOMTR-MCNC: 164 MG/DL — HIGH (ref 70–99)
GLUCOSE BLDC GLUCOMTR-MCNC: 218 MG/DL — HIGH (ref 70–99)
GLUCOSE BLDC GLUCOMTR-MCNC: 269 MG/DL — HIGH (ref 70–99)
GLUCOSE SERPL-MCNC: 223 MG/DL — HIGH (ref 70–99)
HCT VFR BLD CALC: 33 % — LOW (ref 34.5–45)
HGB BLD-MCNC: 11.3 G/DL — LOW (ref 11.5–15.5)
MCHC RBC-ENTMCNC: 32.7 PG — SIGNIFICANT CHANGE UP (ref 27–34)
MCHC RBC-ENTMCNC: 34.3 GM/DL — SIGNIFICANT CHANGE UP (ref 32–36)
MCV RBC AUTO: 95.5 FL — SIGNIFICANT CHANGE UP (ref 80–100)
PLATELET # BLD AUTO: 293 K/UL — SIGNIFICANT CHANGE UP (ref 150–400)
POTASSIUM SERPL-MCNC: 5 MMOL/L — SIGNIFICANT CHANGE UP (ref 3.5–5.3)
POTASSIUM SERPL-SCNC: 5 MMOL/L — SIGNIFICANT CHANGE UP (ref 3.5–5.3)
RBC # BLD: 3.46 M/UL — LOW (ref 3.8–5.2)
RBC # FLD: 18.7 % — HIGH (ref 10.3–14.5)
SODIUM SERPL-SCNC: 126 MMOL/L — LOW (ref 135–145)
WBC # BLD: 16.7 K/UL — HIGH (ref 3.8–10.5)
WBC # FLD AUTO: 16.7 K/UL — HIGH (ref 3.8–10.5)

## 2019-04-16 RX ADMIN — Medication 1 APPLICATION(S): at 05:50

## 2019-04-16 RX ADMIN — Medication 3: at 12:47

## 2019-04-16 RX ADMIN — Medication 2: at 08:52

## 2019-04-16 RX ADMIN — Medication 1 APPLICATION(S): at 21:32

## 2019-04-16 RX ADMIN — APIXABAN 5 MILLIGRAM(S): 2.5 TABLET, FILM COATED ORAL at 05:52

## 2019-04-16 RX ADMIN — Medication 300 MILLIGRAM(S): at 10:04

## 2019-04-16 RX ADMIN — GABAPENTIN 300 MILLIGRAM(S): 400 CAPSULE ORAL at 10:01

## 2019-04-16 RX ADMIN — GABAPENTIN 300 MILLIGRAM(S): 400 CAPSULE ORAL at 21:13

## 2019-04-16 RX ADMIN — Medication 12.5 MILLIGRAM(S): at 21:26

## 2019-04-16 RX ADMIN — Medication 1 DROP(S): at 21:27

## 2019-04-16 RX ADMIN — Medication 12.5 MILLIGRAM(S): at 09:06

## 2019-04-16 RX ADMIN — Medication 1.5 MILLIGRAM(S): at 21:30

## 2019-04-16 RX ADMIN — APIXABAN 5 MILLIGRAM(S): 2.5 TABLET, FILM COATED ORAL at 17:11

## 2019-04-16 RX ADMIN — Medication 3 MILLILITER(S): at 23:59

## 2019-04-16 RX ADMIN — Medication 250 MILLIGRAM(S): at 10:01

## 2019-04-16 RX ADMIN — Medication 1 DROP(S): at 14:07

## 2019-04-16 RX ADMIN — Medication 25 MILLIGRAM(S): at 09:08

## 2019-04-16 RX ADMIN — BUDESONIDE AND FORMOTEROL FUMARATE DIHYDRATE 2 PUFF(S): 160; 4.5 AEROSOL RESPIRATORY (INHALATION) at 09:07

## 2019-04-16 RX ADMIN — TIZANIDINE 4 MILLIGRAM(S): 4 TABLET ORAL at 21:13

## 2019-04-16 RX ADMIN — Medication 10 MILLIGRAM(S): at 05:52

## 2019-04-16 RX ADMIN — TIZANIDINE 4 MILLIGRAM(S): 4 TABLET ORAL at 09:08

## 2019-04-16 RX ADMIN — Medication 3 MILLILITER(S): at 00:15

## 2019-04-16 RX ADMIN — BUDESONIDE AND FORMOTEROL FUMARATE DIHYDRATE 2 PUFF(S): 160; 4.5 AEROSOL RESPIRATORY (INHALATION) at 21:39

## 2019-04-16 RX ADMIN — FAMOTIDINE 20 MILLIGRAM(S): 10 INJECTION INTRAVENOUS at 10:05

## 2019-04-16 RX ADMIN — LISINOPRIL 5 MILLIGRAM(S): 2.5 TABLET ORAL at 21:26

## 2019-04-16 RX ADMIN — Medication 3 MILLILITER(S): at 17:11

## 2019-04-16 RX ADMIN — Medication 3 MILLILITER(S): at 05:52

## 2019-04-16 RX ADMIN — Medication 500 MILLIGRAM(S): at 10:04

## 2019-04-16 RX ADMIN — Medication 1 TABLET(S): at 10:05

## 2019-04-16 RX ADMIN — Medication 3 MILLILITER(S): at 12:48

## 2019-04-16 RX ADMIN — Medication 1 APPLICATION(S): at 14:12

## 2019-04-16 RX ADMIN — QUETIAPINE FUMARATE 25 MILLIGRAM(S): 200 TABLET, FILM COATED ORAL at 21:26

## 2019-04-16 RX ADMIN — Medication 1 DROP(S): at 05:51

## 2019-04-16 NOTE — PROGRESS NOTE ADULT - SUBJECTIVE AND OBJECTIVE BOX
---___---___---___---___---___---___ ---___---___---___---___---___---___---___---___---___---                    <<<  M E D I C A L   A T T E N D I N G    F O L L O W    U P   N O T E  >>>    patient improving wbc trending down      ---___---___---___---___---___---      <<<  MEDICATIONS:  >>>    MEDICATIONS  (STANDING):  ALBUTerol    90 MICROgram(s) HFA Inhaler 1 Puff(s) Inhalation every 4 hours  ALBUTerol/ipratropium for Nebulization 3 milliLiter(s) Nebulizer every 6 hours  apixaban 5 milliGRAM(s) Oral every 12 hours  artificial tears (preservative free) Ophthalmic Solution 1 Drop(s) Both EYES three times a day  ascorbic acid 500 milliGRAM(s) Oral daily  buDESOnide 160 MICROgram(s)/formoterol 4.5 MICROgram(s) Inhaler 2 Puff(s) Inhalation two times a day  clonazePAM Tablet 1.5 milliGRAM(s) Oral <User Schedule>  dextrose 5%. 1000 milliLiter(s) (50 mL/Hr) IV Continuous <Continuous>  dextrose 50% Injectable 12.5 Gram(s) IV Push once  dextrose 50% Injectable 25 Gram(s) IV Push once  dextrose 50% Injectable 25 Gram(s) IV Push once  ergocalciferol 79676 Unit(s) Oral every week  famotidine    Tablet 20 milliGRAM(s) Oral daily  ferrous    sulfate Liquid 300 milliGRAM(s) Enteral Tube daily  gabapentin   Solution 300 milliGRAM(s) Oral two times a day  insulin lispro (HumaLOG) corrective regimen sliding scale   SubCutaneous three times a day before meals  insulin lispro (HumaLOG) corrective regimen sliding scale   SubCutaneous at bedtime  lisinopril 5 milliGRAM(s) Enteral Tube at bedtime  Medical Marijuana Oil 1 milliLiter(s) 1 milliLiter(s) Oral <User Schedule>  metoprolol tartrate 12.5 milliGRAM(s) Enteral Tube two times a day  multivitamin/minerals 1 Tablet(s) Oral daily  petrolatum white Ointment 1 Application(s) Topical three times a day  predniSONE   Tablet   Oral   predniSONE   Tablet 10 milliGRAM(s) Oral daily  QUEtiapine 25 milliGRAM(s) Oral at bedtime  sodium chloride 0.9% Bolus 500 milliLiter(s) IV Bolus once  tiotropium 18 MICROgram(s) Capsule 1 Capsule(s) Inhalation daily  tiZANidine 4 milliGRAM(s) Oral <User Schedule>  vancomycin  IVPB 1000 milliGRAM(s) IV Intermittent every 24 hours      MEDICATIONS  (PRN):  acetaminophen    Suspension .. 650 milliGRAM(s) Oral every 6 hours PRN Temp greater or equal to 38C (100.4F), Mild Pain (1 - 3), Moderate Pain (4 - 6)  dextrose 40% Gel 15 Gram(s) Oral once PRN Blood Glucose LESS THAN 70 milliGRAM(s)/deciLiter  diphenhydrAMINE   Elixir 25 milliGRAM(s) Oral every 6 hours PRN Rash and/or Itching  glucagon  Injectable 1 milliGRAM(s) IntraMuscular once PRN Glucose <70 milliGRAM(s)/deciLiter  nystatin Powder 1 Application(s) Topical two times a day PRN rash/itchiness  oxyCODONE    IR 5 milliGRAM(s) Oral every 6 hours PRN Moderate Pain (4 - 6)/Severe Pain (7 - 10)  sodium chloride 0.65% Nasal 1 Spray(s) Both Nostrils three times a day PRN Nasal Congestion       ---___---___---___---___---___---     <<<REVIEW OF SYSTEM: >>>  unable to obtain      ---___---___---___---___---___---          <<<  VITAL SIGNS: >>>    T(F): 97.6 (04-16-19 @ 04:46), Max: 98.1 (04-15-19 @ 21:02)  HR: 98 (04-16-19 @ 04:46) (98 - 103)  BP: 113/70 (04-16-19 @ 04:46) (112/74 - 115/74)  RR: 18 (04-16-19 @ 04:46) (18 - 18)  SpO2: 98% (04-16-19 @ 04:46) (95% - 98%)  Wt(kg): --  CAPILLARY BLOOD GLUCOSE      POCT Blood Glucose.: 128 mg/dL (15 Apr 2019 21:24)    I&O's Summary    15 Apr 2019 07:01  -  16 Apr 2019 07:00  --------------------------------------------------------  IN: 0 mL / OUT: 1300 mL / NET: -1300 mL         ---___---___---___---___---___---                       PHYSICAL EXAM:    GEN: A&O X0 , NAD , comfortable  HEENT: NCAT, PERRL, MMM, no scleral icterus, hearing intact  NECK: Supple, No JVD  CVS: S1S2 , regular , No M/R/G appreciated  PULM: CTA B/L,  no W/R/R appreciated  ABD.: soft. non tender, non distended,  bowel sounds present  Extrem: intact pulses , no edema noted  Derm: No rash or ecchymosis noted  PSYCH: normal mood, no depression, not anxious     ---___---___---___---___---___---     <<<  LAB AND IMAGING: >>>                          11.3   16.7  )-----------( 293      ( 16 Apr 2019 06:28 )             33.0               04-15    132<L>  |  93<L>  |  30<H>  ----------------------------<  155<H>  4.5   |  28  |  <0.30<L>    Ca    8.0<L>      15 Apr 2019 06:22                                 [All pertinent / recent available Imaging reports and other labs reviewed]     ---___---___---___---___---___---___ ---___---___---___---___---           <<<  A S S E S S M E N T   A N D   P L A N :  >>>    chronic mrsa infection continue vancomycin   dementia without behavioral disturbance  contiue seroquel remeron and klonopin   hyperglycemia secondary to medication  prednisone taper on board  monitor fingerstick   asthma continue budesonide   chf 20% ef  on lisinopril  agitation  contie seroqul and klonopin  medical marijuana for pain   -GI/DVT Prophylaxis. eliquis for dvt prophylaxis     START DC PLANNING FOR THURSDAY       -GI/DVT Prophylaxis.    --------------------------------------------  Case discussed with   Education given on     >>______________________<<      Deniz Bolden .         phone   [phone]

## 2019-04-16 NOTE — PROGRESS NOTE ADULT - ASSESSMENT
68F s/p R hip PJI explant and placement of cement spacer with distal femur ORIF, now complicated by septic shock readmitted to SICU  Pain control  FU ID  FU BCx  Abx per SICU, cefepime flagyl vanc  pressors wean as tolerated  monitor resp status  PEG feeds  care per sicu  NWB RLE, FFWB LLEPT/OT/OOB 2

## 2019-04-16 NOTE — CHART NOTE - NSCHARTNOTEFT_GEN_A_CORE
Nutrition Follow Up Note  Patient seen for: follow up    Reason for Admission	fevers    chronic mrsa infection   dementia without behavioral disturbance    hyperglycemia secondary to medication  prednisone   asthma   chf 20% ef    agitation    medical marijuana for pain   -GI/DVT Prophylaxis. eliquis for dvt prophylaxis     DC PLANNING FOR THURSDAY       Source: chart, nurse    Diet : ENTERAL, NPO/ENTERAL      Patient reports: nonverbal         Enteral /Parenteral Nutrition:   continue Glucerna 1.5 (4 cans daily) provides 1424calories/78grams protein/720g/mlwater) 34kcal/kg and 1.8grams protein/kg. based on IBW of 42kg.  TF PRO SOURCE X 2 DAILY  DANACTIVE X 2 DAILY  BANATROL X 4 DAILY    Daily Weight in k (-16), Weight in k.8 (-15), Weight in k.1 (-13), Weight in k.1 (-12), Weight in k.1 (-11), Weight in k.4 (-10)  % Weight Change: 26% gain since 3/27   (current BMI 25)    Pertinent Medications: MEDICATIONS  (STANDING):  ALBUTerol    90 MICROgram(s) HFA Inhaler 1 Puff(s) Inhalation every 4 hours  ALBUTerol/ipratropium for Nebulization 3 milliLiter(s) Nebulizer every 6 hours  apixaban 5 milliGRAM(s) Oral every 12 hours  artificial tears (preservative free) Ophthalmic Solution 1 Drop(s) Both EYES three times a day  ascorbic acid 500 milliGRAM(s) Oral daily  buDESOnide 160 MICROgram(s)/formoterol 4.5 MICROgram(s) Inhaler 2 Puff(s) Inhalation two times a day  clonazePAM Tablet 1.5 milliGRAM(s) Oral <User Schedule>  dextrose 5%. 1000 milliLiter(s) (50 mL/Hr) IV Continuous <Continuous>  dextrose 50% Injectable 12.5 Gram(s) IV Push once  dextrose 50% Injectable 25 Gram(s) IV Push once  dextrose 50% Injectable 25 Gram(s) IV Push once  ergocalciferol 26527 Unit(s) Oral every week  famotidine    Tablet 20 milliGRAM(s) Oral daily  ferrous    sulfate Liquid 300 milliGRAM(s) Enteral Tube daily  gabapentin   Solution 300 milliGRAM(s) Oral two times a day  insulin lispro (HumaLOG) corrective regimen sliding scale   SubCutaneous three times a day before meals  insulin lispro (HumaLOG) corrective regimen sliding scale   SubCutaneous at bedtime  lisinopril 5 milliGRAM(s) Enteral Tube at bedtime  Medical Marijuana Oil 1 milliLiter(s) 1 milliLiter(s) Oral <User Schedule>  metoprolol tartrate 12.5 milliGRAM(s) Enteral Tube two times a day  multivitamin/minerals 1 Tablet(s) Oral daily  petrolatum white Ointment 1 Application(s) Topical three times a day  predniSONE   Tablet   Oral   predniSONE   Tablet 10 milliGRAM(s) Oral daily  QUEtiapine 25 milliGRAM(s) Oral at bedtime  sodium chloride 0.9% Bolus 500 milliLiter(s) IV Bolus once  tiotropium 18 MICROgram(s) Capsule 1 Capsule(s) Inhalation daily  tiZANidine 4 milliGRAM(s) Oral <User Schedule>  vancomycin  IVPB 1000 milliGRAM(s) IV Intermittent every 24 hours    MEDICATIONS  (PRN):  acetaminophen    Suspension .. 650 milliGRAM(s) Oral every 6 hours PRN Temp greater or equal to 38C (100.4F), Mild Pain (1 - 3), Moderate Pain (4 - 6)  dextrose 40% Gel 15 Gram(s) Oral once PRN Blood Glucose LESS THAN 70 milliGRAM(s)/deciLiter  diphenhydrAMINE   Elixir 25 milliGRAM(s) Oral every 6 hours PRN Rash and/or Itching  glucagon  Injectable 1 milliGRAM(s) IntraMuscular once PRN Glucose <70 milliGRAM(s)/deciLiter  nystatin Powder 1 Application(s) Topical two times a day PRN rash/itchiness  oxyCODONE    IR 5 milliGRAM(s) Oral every 6 hours PRN Moderate Pain (4 - 6)/Severe Pain (7 - 10)  sodium chloride 0.65% Nasal 1 Spray(s) Both Nostrils three times a day PRN Nasal Congestion    Pertinent Labs:  @ 06:28: Na 126<L>, BUN 34<H>, Cr <0.30<L>, <H>, K+ 5.0    Finger Sticks:  POCT Blood Glucose.: 269 mg/dL ( @ 12:43)  POCT Blood Glucose.: 218 mg/dL ( @ 08:37)  POCT Blood Glucose.: 128 mg/dL (04-15 @ 21:24)  POCT Blood Glucose.: 108 mg/dL (04-15 @ 18:02)      Skin per nursing documentation: stage 4 sacrum, stage 3 right hip  Edema: +1 left hand, + 3 left leg    Estimated Needs:   [x ] no change since previous assessment  [ ] recalculated:     Previous Nutrition Diagnosis:  Increased nutrient needs (specify); calories/protein  Nutrition Diagnosis is: ongoing-being addressed with supplement        Recommend  1)continue  TF pro source at 8am and 4pm. Danactive at 12pm and at 8pm.  2)continue VitC  3)continue Glucerna 1.5 (4 cans daily) provides 1424calories/78grams protein/720g/mlwater) 34kcal/kg and 1.8grams protein/kg. based on IBW of 42kg. fluid deferred to MD  4)continue multivitamin with minerals  5)bantrol 4 packs daily. monitor diarrhea and if not resolved increase  Banatrol  to a max of 6 packs daily as discussed with NP.   Provider to RN. Mix 1/2 pack of Banantrol with 120ml H20, provide via tube feed. One hour later mix other 1/2 pack with 120ml H20 and provide via tube feed.   provide each pack at 8am, 12pm, 4pm and 8pm      Monitoring and Evaluation:     Continue to monitor Nutritional intake, Tolerance to diet prescription, weights, labs, skin integrity    RD remains available upon request and will follow up per protocol  Anne Marie Morgan MA, RD, CDN #[phone]

## 2019-04-17 LAB
ANION GAP SERPL CALC-SCNC: 11 MMOL/L — SIGNIFICANT CHANGE UP (ref 5–17)
BUN SERPL-MCNC: 40 MG/DL — HIGH (ref 7–23)
CALCIUM SERPL-MCNC: 7.5 MG/DL — LOW (ref 8.4–10.5)
CHLORIDE SERPL-SCNC: 88 MMOL/L — LOW (ref 96–108)
CO2 SERPL-SCNC: 26 MMOL/L — SIGNIFICANT CHANGE UP (ref 22–31)
CREAT SERPL-MCNC: <0.3 MG/DL — LOW (ref 0.5–1.3)
GLUCOSE BLDC GLUCOMTR-MCNC: 172 MG/DL — HIGH (ref 70–99)
GLUCOSE BLDC GLUCOMTR-MCNC: 178 MG/DL — HIGH (ref 70–99)
GLUCOSE BLDC GLUCOMTR-MCNC: 202 MG/DL — HIGH (ref 70–99)
GLUCOSE BLDC GLUCOMTR-MCNC: 259 MG/DL — HIGH (ref 70–99)
GLUCOSE SERPL-MCNC: 114 MG/DL — HIGH (ref 70–99)
HCT VFR BLD CALC: 26.5 % — LOW (ref 34.5–45)
HGB BLD-MCNC: 9.2 G/DL — LOW (ref 11.5–15.5)
MAGNESIUM SERPL-MCNC: 1.9 MG/DL — SIGNIFICANT CHANGE UP (ref 1.6–2.6)
MCHC RBC-ENTMCNC: 33.2 PG — SIGNIFICANT CHANGE UP (ref 27–34)
MCHC RBC-ENTMCNC: 34.8 GM/DL — SIGNIFICANT CHANGE UP (ref 32–36)
MCV RBC AUTO: 95.4 FL — SIGNIFICANT CHANGE UP (ref 80–100)
PLATELET # BLD AUTO: 309 K/UL — SIGNIFICANT CHANGE UP (ref 150–400)
POTASSIUM SERPL-MCNC: 4.7 MMOL/L — SIGNIFICANT CHANGE UP (ref 3.5–5.3)
POTASSIUM SERPL-SCNC: 4.7 MMOL/L — SIGNIFICANT CHANGE UP (ref 3.5–5.3)
RBC # BLD: 2.78 M/UL — LOW (ref 3.8–5.2)
RBC # FLD: 18.7 % — HIGH (ref 10.3–14.5)
SODIUM SERPL-SCNC: 125 MMOL/L — LOW (ref 135–145)
WBC # BLD: 18.8 K/UL — HIGH (ref 3.8–10.5)
WBC # FLD AUTO: 18.8 K/UL — HIGH (ref 3.8–10.5)

## 2019-04-17 PROCEDURE — 71045 X-RAY EXAM CHEST 1 VIEW: CPT | Mod: 26

## 2019-04-17 RX ORDER — ALBUTEROL 90 UG/1
2.5 AEROSOL, METERED ORAL ONCE
Qty: 0 | Refills: 0 | Status: COMPLETED | OUTPATIENT
Start: 2019-04-17 | End: 2019-04-17

## 2019-04-17 RX ORDER — ETHACRYNIC ACID 25 MG/1
25 TABLET ORAL ONCE
Qty: 0 | Refills: 0 | Status: COMPLETED | OUTPATIENT
Start: 2019-04-17 | End: 2019-04-17

## 2019-04-17 RX ORDER — LOPERAMIDE HCL 2 MG
2 TABLET ORAL DAILY
Qty: 0 | Refills: 0 | Status: DISCONTINUED | OUTPATIENT
Start: 2019-04-17 | End: 2019-04-25

## 2019-04-17 RX ORDER — SODIUM CHLORIDE 9 MG/ML
250 INJECTION INTRAMUSCULAR; INTRAVENOUS; SUBCUTANEOUS ONCE
Qty: 0 | Refills: 0 | Status: COMPLETED | OUTPATIENT
Start: 2019-04-17 | End: 2019-04-17

## 2019-04-17 RX ADMIN — Medication 12.5 MILLIGRAM(S): at 21:29

## 2019-04-17 RX ADMIN — APIXABAN 5 MILLIGRAM(S): 2.5 TABLET, FILM COATED ORAL at 17:20

## 2019-04-17 RX ADMIN — Medication 650 MILLIGRAM(S): at 20:22

## 2019-04-17 RX ADMIN — GABAPENTIN 300 MILLIGRAM(S): 400 CAPSULE ORAL at 21:29

## 2019-04-17 RX ADMIN — Medication 25 MILLIGRAM(S): at 09:40

## 2019-04-17 RX ADMIN — Medication 3: at 13:07

## 2019-04-17 RX ADMIN — ETHACRYNIC ACID 25 MILLIGRAM(S): 25 TABLET ORAL at 16:16

## 2019-04-17 RX ADMIN — Medication 1 DROP(S): at 14:32

## 2019-04-17 RX ADMIN — BUDESONIDE AND FORMOTEROL FUMARATE DIHYDRATE 2 PUFF(S): 160; 4.5 AEROSOL RESPIRATORY (INHALATION) at 09:26

## 2019-04-17 RX ADMIN — Medication 3 MILLILITER(S): at 13:03

## 2019-04-17 RX ADMIN — OXYCODONE HYDROCHLORIDE 5 MILLIGRAM(S): 5 TABLET ORAL at 15:42

## 2019-04-17 RX ADMIN — Medication 1 APPLICATION(S): at 13:25

## 2019-04-17 RX ADMIN — Medication 3 MILLILITER(S): at 23:07

## 2019-04-17 RX ADMIN — Medication 1 APPLICATION(S): at 21:35

## 2019-04-17 RX ADMIN — Medication 10 MILLIGRAM(S): at 06:36

## 2019-04-17 RX ADMIN — FAMOTIDINE 20 MILLIGRAM(S): 10 INJECTION INTRAVENOUS at 09:26

## 2019-04-17 RX ADMIN — Medication 650 MILLIGRAM(S): at 19:52

## 2019-04-17 RX ADMIN — APIXABAN 5 MILLIGRAM(S): 2.5 TABLET, FILM COATED ORAL at 06:36

## 2019-04-17 RX ADMIN — Medication 500 MILLIGRAM(S): at 09:26

## 2019-04-17 RX ADMIN — TIZANIDINE 4 MILLIGRAM(S): 4 TABLET ORAL at 21:29

## 2019-04-17 RX ADMIN — OXYCODONE HYDROCHLORIDE 5 MILLIGRAM(S): 5 TABLET ORAL at 16:00

## 2019-04-17 RX ADMIN — SODIUM CHLORIDE 250 MILLILITER(S): 9 INJECTION INTRAMUSCULAR; INTRAVENOUS; SUBCUTANEOUS at 00:45

## 2019-04-17 RX ADMIN — QUETIAPINE FUMARATE 25 MILLIGRAM(S): 200 TABLET, FILM COATED ORAL at 22:56

## 2019-04-17 RX ADMIN — Medication 1 DROP(S): at 21:34

## 2019-04-17 RX ADMIN — Medication 3 MILLILITER(S): at 06:36

## 2019-04-17 RX ADMIN — Medication 1 DROP(S): at 06:36

## 2019-04-17 RX ADMIN — Medication 300 MILLIGRAM(S): at 09:26

## 2019-04-17 RX ADMIN — TIZANIDINE 4 MILLIGRAM(S): 4 TABLET ORAL at 09:26

## 2019-04-17 RX ADMIN — Medication 1: at 09:27

## 2019-04-17 RX ADMIN — Medication 1 APPLICATION(S): at 07:45

## 2019-04-17 RX ADMIN — BUDESONIDE AND FORMOTEROL FUMARATE DIHYDRATE 2 PUFF(S): 160; 4.5 AEROSOL RESPIRATORY (INHALATION) at 21:29

## 2019-04-17 RX ADMIN — LISINOPRIL 5 MILLIGRAM(S): 2.5 TABLET ORAL at 21:29

## 2019-04-17 RX ADMIN — Medication 250 MILLIGRAM(S): at 10:40

## 2019-04-17 RX ADMIN — Medication 1 TABLET(S): at 10:23

## 2019-04-17 RX ADMIN — ALBUTEROL 2.5 MILLIGRAM(S): 90 AEROSOL, METERED ORAL at 15:42

## 2019-04-17 RX ADMIN — Medication 12.5 MILLIGRAM(S): at 09:26

## 2019-04-17 RX ADMIN — Medication 1: at 17:20

## 2019-04-17 RX ADMIN — Medication 3 MILLILITER(S): at 17:20

## 2019-04-17 RX ADMIN — Medication 1.5 MILLIGRAM(S): at 22:56

## 2019-04-17 NOTE — PROVIDER CONTACT NOTE (OTHER) - ASSESSMENT
patient asymptomatic. patient is alert and non-verbal. patient tolerating 1 can 4x/day via peg. checked the residual 30ml.

## 2019-04-17 NOTE — CHART NOTE - NSCHARTNOTEFT_GEN_A_CORE
CC: BP 94/53      HPI:  Called by RN  Patient denied headache, dizziness, CP, SOB, abdominal  pain, N/V/D, numbness/tingling, extremity weakness, dysuria.         ROS:  CONSTITUTIONAL:  No fever, chills, rigors  CARDIOVASCULAR:  No chest pain or palpitations  RESPIRATORY:   No SOB, cough, dyspnea on exertion.  No wheezing  GASTROINTESTINAL:  No abd pain, N/V, diarrhea/constipation  EXTREMITIES:  No swelling or joint pain  GENITOURINARY:  No burning on urination, increased frequency or urgency.  No flank pain  NEUROLOGIC:  No HA, visual disturbances  SKIN: No rashes        PAST MEDICAL & SURGICAL HISTORY:  CVA (cerebral vascular accident)  Fatty pancreas  PNA (pneumonia)  Pulmonary HTN  IGT (impaired glucose tolerance)  Ulcerative colitis  Acid reflux  Anxiety  Depression  Mouth sores  HLD (hyperlipidemia)  Asthma  Humeral head fracture  H/O: hysterectomy  H/O cataract extraction, left  History of knee replacement          Vital Signs Last 24 Hrs  T(C): 36.7 (17 Apr 2019 00:15), Max: 36.8 (16 Apr 2019 11:59)  T(F): 98.1 (17 Apr 2019 00:15), Max: 98.3 (16 Apr 2019 11:59)  HR: 99 (17 Apr 2019 00:15) (96 - 105)  BP: 94/53 (17 Apr 2019 00:15) (94/53 - 124/73)  BP(mean): --  RR: 18 (17 Apr 2019 00:15) (18 - 19)  SpO2: 100% (17 Apr 2019 00:15) (93% - 100%)      Physical Exam:  General: WN/WD NAD, AOx3, nontoxic appearing  Head:  NC/AT  CV: RRR, S1S2   Respiratory: CTA B/L, nonlabored  Abdominal: (+) bowel sounds x4. Soft, NT, ND, no palpable mass, no guarding, or rebound tenderness  Genitourinary: ? Gavin   MSK: No BLLE edema, + peripheral pulses, FROM all 4 extremity  Skin: (+) warm, dry   Psych: Appropriate affect       Labs:                        11.3   16.7  )-----------( 293      ( 16 Apr 2019 06:28 )             33.0     04-16    126<L>  |  89<L>  |  34<H>  ----------------------------<  223<H>  5.0   |  24  |  <0.30<L>    Ca    8.1<L>      16 Apr 2019 06:28                Radiology:          Assessment & Plan:  HPI:  This patient is a 68yoF with PMH of advanced dementia (nonverbal, dysphagia with PEG tube, functional quadriplegic, chronic gavin catheter), sacral stage 4 pressure ulcer, heel pressure ulcers, asthma on chronic prednisone., hLd, CVA, UC (in remission for 30years), 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 History was provided by patient's daughters, Tere and Angie at bedside. Patient has been having persistent fever at home up to 103F. She has appeared more lethargic for the last 2 days and has had labored breathing, but no cough. She has loose nonbloody stools. No recent emesis, melena or hematuria. Urine output via gavin catheter is normal yellow appearance and had "good output." Patient gets regular wound care but has persistent ulcers. Because of persistent fevers, pt was brought to ED.     This patient was previously hospitalized from 2/26-3/18/2019 for fevers, found to have septic shock 2/2 UTI vs sacral ulcer vs PNA, requiring pressor support and MICU stay. 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 Offer was made to change the spacer and get cultures, however at that time, the  did not want to put the patient through surgery. Pt completed a course of meropenem, and was resumed on suppressive minocycline.     In the ED, T 100.8 Rectal  , /63, RR 14, SpO2 98%RA  MEETS SIRS CRITERIA based on HR and temperature.   She was given IVNS 1.5L, acetaminophen 650mg and meropenem 1g. (24 Mar 2019 20:54)        -  -  -Will continue to closely monitor patient/vitals  -Primary Team to follow up in AM, attending to follow       Luis Maria PA-C  Dept of Medicine Notified by RN that patient with BP 94/53. Patient seen and assessed at bedside, NAD. Unable to assess ROS because patient is nonverbal at baseline.         Vital Signs Last 24 Hrs  T(C): 36.7 (17 Apr 2019 00:15), Max: 36.8 (16 Apr 2019 11:59)  T(F): 98.1 (17 Apr 2019 00:15), Max: 98.3 (16 Apr 2019 11:59)  HR: 99 (17 Apr 2019 00:15) (96 - 105)  BP: 94/53 (17 Apr 2019 00:15) (94/53 - 124/73)  RR: 18 (17 Apr 2019 00:15) (18 - 19)  SpO2: 100% (17 Apr 2019 00:15) (93% - 100%)      Physical Exam:  General: Thin, chronically ill-appearing female laying in bed, contracted, NAD  Head:  NC/AT  CV: RRR, S1S2   Respiratory: CTA B/L, nonlabored  Genitourinary: ? Gavin   MSK: No BLLE edema, + peripheral pulses  Skin: (+) warm, dry         Labs:                        11.3   16.7  )-----------( 293      ( 16 Apr 2019 06:28 )             33.0     04-16    126<L>  |  89<L>  |  34<H>  ----------------------------<  223<H>  5.0   |  24  |  <0.30<L>    Ca    8.1<L>      16 Apr 2019 06:28                Radiology:          Assessment & Plan:  HPI:  This patient is a 68yoF with PMH of advanced dementia (nonverbal, dysphagia with PEG tube, functional quadriplegic, chronic gavin catheter), sacral stage 4 pressure ulcer, heel pressure ulcers, asthma on chronic prednisone., hLd, CVA, UC (in remission for 30years), 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 History was provided by patient's daughters, Tere and Angie at bedside. Patient has been having persistent fever at home up to 103F. She has appeared more lethargic for the last 2 days and has had labored breathing, but no cough. She has loose nonbloody stools. No recent emesis, melena or hematuria. Urine output via gavin catheter is normal yellow appearance and had "good output." Patient gets regular wound care but has persistent ulcers. Because of persistent fevers, pt was brought to ED.     This patient was previously hospitalized from 2/26-3/18/2019 for fevers, found to have septic shock 2/2 UTI vs sacral ulcer vs PNA, requiring pressor support and MICU stay. 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 Offer was made to change the spacer and get cultures, however at that time, the  did not want to put the patient through surgery. Pt completed a course of meropenem, and was resumed on suppressive minocycline.     In the ED, T 100.8 Rectal  , /63, RR 14, SpO2 98%RA  MEETS SIRS CRITERIA based on HR and temperature.   She was given IVNS 1.5L, acetaminophen 650mg and meropenem 1g. (24 Mar 2019 20:54)        -  -  -Will continue to closely monitor patient/vitals  -Primary Team to follow up in AM, attending to follow       Luis Maria PA-C  Dept of Medicine Notified by RN that patient with BP 94/53. Patient seen and assessed at bedside, NAD. Unable to assess ROS because patient is nonverbal at baseline.         Vital Signs Last 24 Hrs  T(C): 36.7 (17 Apr 2019 00:15), Max: 36.8 (16 Apr 2019 11:59)  T(F): 98.1 (17 Apr 2019 00:15), Max: 98.3 (16 Apr 2019 11:59)  HR: 99 (17 Apr 2019 00:15) (96 - 105)  BP: 94/53 (17 Apr 2019 00:15) (94/53 - 124/73)  RR: 18 (17 Apr 2019 00:15) (18 - 19)  SpO2: 100% (17 Apr 2019 00:15) (93% - 100%)      Physical Exam:  General: Thin, chronically ill-appearing female laying in bed, contracted, NAD  Head:  NC/AT  CV: RRR, S1S2   Respiratory: CTA B/L, nonlabored  Genitourinary: ? 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     #Hypotension  -250mL IVNS bolus x1  -Metoprolol with hold parameters  -Will continue to closely monitor patient/vitals  -Primary Team to follow up in AM, attending to follow       Luis Maria PA-C  Dept of Medicine  [phone]

## 2019-04-17 NOTE — PROGRESS NOTE ADULT - SUBJECTIVE AND OBJECTIVE BOX
---___---___---___---___---___---___ ---___---___---___---___---___---___---___---___---___---                    <<<  M E D I C A L   A T T E N D I N G    F O L L O W    U P   N O T E  >>>    - has been stable  awaiting dc in am    ---___---___---___---___---___---      <<<  MEDICATIONS:  >>>    MEDICATIONS  (STANDING):  ALBUTerol    90 MICROgram(s) HFA Inhaler 1 Puff(s) Inhalation every 4 hours  ALBUTerol/ipratropium for Nebulization 3 milliLiter(s) Nebulizer every 6 hours  apixaban 5 milliGRAM(s) Oral every 12 hours  artificial tears (preservative free) Ophthalmic Solution 1 Drop(s) Both EYES three times a day  ascorbic acid 500 milliGRAM(s) Oral daily  buDESOnide 160 MICROgram(s)/formoterol 4.5 MICROgram(s) Inhaler 2 Puff(s) Inhalation two times a day  clonazePAM Tablet 1.5 milliGRAM(s) Oral <User Schedule>  dextrose 5%. 1000 milliLiter(s) (50 mL/Hr) IV Continuous <Continuous>  dextrose 50% Injectable 12.5 Gram(s) IV Push once  dextrose 50% Injectable 25 Gram(s) IV Push once  dextrose 50% Injectable 25 Gram(s) IV Push once  ergocalciferol 16264 Unit(s) Oral every week  famotidine    Tablet 20 milliGRAM(s) Oral daily  ferrous    sulfate Liquid 300 milliGRAM(s) Enteral Tube daily  gabapentin   Solution 300 milliGRAM(s) Oral two times a day  insulin lispro (HumaLOG) corrective regimen sliding scale   SubCutaneous three times a day before meals  insulin lispro (HumaLOG) corrective regimen sliding scale   SubCutaneous at bedtime  lisinopril 5 milliGRAM(s) Enteral Tube at bedtime  Medical Marijuana Oil 1 milliLiter(s) 1 milliLiter(s) Oral <User Schedule>  metoprolol tartrate 12.5 milliGRAM(s) Enteral Tube two times a day  multivitamin/minerals 1 Tablet(s) Oral daily  petrolatum white Ointment 1 Application(s) Topical three times a day  predniSONE   Tablet   Oral   predniSONE   Tablet 10 milliGRAM(s) Oral daily  QUEtiapine 25 milliGRAM(s) Oral at bedtime  sodium chloride 0.9% Bolus 500 milliLiter(s) IV Bolus once  tiotropium 18 MICROgram(s) Capsule 1 Capsule(s) Inhalation daily  tiZANidine 4 milliGRAM(s) Oral <User Schedule>  vancomycin  IVPB 1000 milliGRAM(s) IV Intermittent every 24 hours      MEDICATIONS  (PRN):  acetaminophen    Suspension .. 650 milliGRAM(s) Oral every 6 hours PRN Temp greater or equal to 38C (100.4F), Mild Pain (1 - 3), Moderate Pain (4 - 6)  dextrose 40% Gel 15 Gram(s) Oral once PRN Blood Glucose LESS THAN 70 milliGRAM(s)/deciLiter  diphenhydrAMINE   Elixir 25 milliGRAM(s) Oral every 6 hours PRN Rash and/or Itching  glucagon  Injectable 1 milliGRAM(s) IntraMuscular once PRN Glucose <70 milliGRAM(s)/deciLiter  nystatin Powder 1 Application(s) Topical two times a day PRN rash/itchiness  oxyCODONE    IR 5 milliGRAM(s) Oral every 6 hours PRN Moderate Pain (4 - 6)/Severe Pain (7 - 10)  sodium chloride 0.65% Nasal 1 Spray(s) Both Nostrils three times a day PRN Nasal Congestion       ---___---___---___---___---___---     <<<REVIEW OF SYSTEM: >>>  unable to obtain      ---___---___---___---___---___---          <<<  VITAL SIGNS: >>>    T(F): 98.2 (04-17-19 @ 04:47), Max: 98.2 (04-17-19 @ 04:47)  HR: 110 (04-17-19 @ 09:16) (68 - 110)  BP: 111/59 (04-17-19 @ 09:16) (90/50 - 115/72)  RR: 18 (04-17-19 @ 09:16) (18 - 18)  SpO2: 93% (04-17-19 @ 09:16) (93% - 100%)  Wt(kg): --  CAPILLARY BLOOD GLUCOSE      POCT Blood Glucose.: 259 mg/dL (17 Apr 2019 13:06)    I&O's Summary    16 Apr 2019 07:01  -  17 Apr 2019 07:00  --------------------------------------------------------  IN: 1558 mL / OUT: 1250 mL / NET: 308 mL         ---___---___---___---___---___---                       PHYSICAL EXAM:    GEN: A&O X 0 , NAD , comfortable  HEENT: NCAT, PERRL, MMM, no scleral icterus, hearing intact  NECK: Supple, No JVD  CVS: S1S2 , regular , No M/R/G appreciated  PULM: CTA B/L,  no W/R/R appreciated  ABD.: soft. non tender, non distended,  bowel sounds present peg and spt noted t  Extrem: intact pulses , no edema noted  Derm: No rash or ecchymosis noted  PSYCH: normal mood, no depression, not anxious     ---___---___---___---___---___---     <<<  LAB AND IMAGING: >>>                          9.2    18.8  )-----------( 309      ( 17 Apr 2019 07:20 )             26.5               04-17    125<L>  |  88<L>  |  40<H>  ----------------------------<  114<H>  4.7   |  26  |  <0.30<L>    Ca    7.5<L>      17 Apr 2019 07:15  Mg     1.9     04-17                                 [All pertinent / recent available Imaging reports and other labs reviewed]     ---___---___---___---___---___---___ ---___---___---___---___---           <<<  A S S E S S M E N T   A N D   P L A N :  >>>    chf 20% ef  on lisinopril  agitation  contie seroqul and klonopin  medical marijuana for pain   -GI/DVT Prophylaxis. eliquis for dvt prophylaxis     START DC PLANNING FOR THURSDAY           -GI/DVT Prophylaxis.    --------------------------------------------  Case discussed with   Education given on     >>______________________<<      Deniz Bolden .         phone   [phone]

## 2019-04-17 NOTE — PROVIDER CONTACT NOTE (OTHER) - ASSESSMENT
patient asymptomatic. patient is alert and mostly non-verbal. patient received Clonazepam 1.5 mg at 2200. hr 98, T 98.1F axillary, RR 18, pox  100% on RA

## 2019-04-17 NOTE — PROVIDER CONTACT NOTE (OTHER) - SITUATION
Patient spouse stated that she has been receiving Glucerna 1.5cal 237 ml x4 can /day. but the tube feeding order is 960 ml bolus Q4 hourly.

## 2019-04-18 LAB
ALBUMIN SERPL ELPH-MCNC: 2.6 G/DL — LOW (ref 3.3–5)
ALP SERPL-CCNC: 125 U/L — HIGH (ref 40–120)
ALT FLD-CCNC: 21 U/L — SIGNIFICANT CHANGE UP (ref 10–45)
ANION GAP SERPL CALC-SCNC: 12 MMOL/L — SIGNIFICANT CHANGE UP (ref 5–17)
ANION GAP SERPL CALC-SCNC: 15 MMOL/L — SIGNIFICANT CHANGE UP (ref 5–17)
APPEARANCE UR: ABNORMAL
APTT BLD: 33.4 SEC — SIGNIFICANT CHANGE UP (ref 27.5–36.3)
AST SERPL-CCNC: 23 U/L — SIGNIFICANT CHANGE UP (ref 10–40)
BASE EXCESS BLDA CALC-SCNC: 2.9 MMOL/L — HIGH (ref -2–2)
BILIRUB SERPL-MCNC: 0.4 MG/DL — SIGNIFICANT CHANGE UP (ref 0.2–1.2)
BILIRUB UR-MCNC: NEGATIVE — SIGNIFICANT CHANGE UP
BUN SERPL-MCNC: 39 MG/DL — HIGH (ref 7–23)
BUN SERPL-MCNC: 46 MG/DL — HIGH (ref 7–23)
CALCIUM SERPL-MCNC: 8.3 MG/DL — LOW (ref 8.4–10.5)
CALCIUM SERPL-MCNC: 8.7 MG/DL — SIGNIFICANT CHANGE UP (ref 8.4–10.5)
CHLORIDE SERPL-SCNC: 89 MMOL/L — LOW (ref 96–108)
CHLORIDE SERPL-SCNC: 92 MMOL/L — LOW (ref 96–108)
CO2 BLDA-SCNC: 26 MMOL/L — SIGNIFICANT CHANGE UP (ref 22–30)
CO2 SERPL-SCNC: 23 MMOL/L — SIGNIFICANT CHANGE UP (ref 22–31)
CO2 SERPL-SCNC: 23 MMOL/L — SIGNIFICANT CHANGE UP (ref 22–31)
COLOR SPEC: YELLOW — SIGNIFICANT CHANGE UP
CREAT SERPL-MCNC: 0.3 MG/DL — LOW (ref 0.5–1.3)
CREAT SERPL-MCNC: 0.3 MG/DL — LOW (ref 0.5–1.3)
DIFF PNL FLD: ABNORMAL
GAS PNL BLDA: SIGNIFICANT CHANGE UP
GLUCOSE BLDC GLUCOMTR-MCNC: 215 MG/DL — HIGH (ref 70–99)
GLUCOSE BLDC GLUCOMTR-MCNC: 217 MG/DL — HIGH (ref 70–99)
GLUCOSE BLDC GLUCOMTR-MCNC: 229 MG/DL — HIGH (ref 70–99)
GLUCOSE SERPL-MCNC: 170 MG/DL — HIGH (ref 70–99)
GLUCOSE SERPL-MCNC: 226 MG/DL — HIGH (ref 70–99)
GLUCOSE UR QL: NEGATIVE — SIGNIFICANT CHANGE UP
HCO3 BLDA-SCNC: 25 MMOL/L — SIGNIFICANT CHANGE UP (ref 21–29)
HCT VFR BLD CALC: 25.1 % — LOW (ref 34.5–45)
HCT VFR BLD CALC: 27.2 % — LOW (ref 34.5–45)
HGB BLD-MCNC: 8.2 G/DL — LOW (ref 11.5–15.5)
HGB BLD-MCNC: 9.2 G/DL — LOW (ref 11.5–15.5)
INR BLD: 1.33 RATIO — HIGH (ref 0.88–1.16)
KETONES UR-MCNC: NEGATIVE — SIGNIFICANT CHANGE UP
LACTATE SERPL-SCNC: 3.6 MMOL/L — HIGH (ref 0.7–2)
LEUKOCYTE ESTERASE UR-ACNC: ABNORMAL
MCHC RBC-ENTMCNC: 31.3 PG — SIGNIFICANT CHANGE UP (ref 27–34)
MCHC RBC-ENTMCNC: 32.2 PG — SIGNIFICANT CHANGE UP (ref 27–34)
MCHC RBC-ENTMCNC: 32.7 GM/DL — SIGNIFICANT CHANGE UP (ref 32–36)
MCHC RBC-ENTMCNC: 34 GM/DL — SIGNIFICANT CHANGE UP (ref 32–36)
MCV RBC AUTO: 94.7 FL — SIGNIFICANT CHANGE UP (ref 80–100)
MCV RBC AUTO: 95.7 FL — SIGNIFICANT CHANGE UP (ref 80–100)
NITRITE UR-MCNC: NEGATIVE — SIGNIFICANT CHANGE UP
PCO2 BLDA: 30 MMHG — LOW (ref 32–46)
PH BLDA: 7.54 — HIGH (ref 7.35–7.45)
PH UR: 6 — SIGNIFICANT CHANGE UP (ref 5–8)
PLATELET # BLD AUTO: 277 K/UL — SIGNIFICANT CHANGE UP (ref 150–400)
PLATELET # BLD AUTO: 369 K/UL — SIGNIFICANT CHANGE UP (ref 150–400)
PO2 BLDA: 64 MMHG — LOW (ref 74–108)
POTASSIUM SERPL-MCNC: 4.5 MMOL/L — SIGNIFICANT CHANGE UP (ref 3.5–5.3)
POTASSIUM SERPL-MCNC: 4.5 MMOL/L — SIGNIFICANT CHANGE UP (ref 3.5–5.3)
POTASSIUM SERPL-SCNC: 4.5 MMOL/L — SIGNIFICANT CHANGE UP (ref 3.5–5.3)
POTASSIUM SERPL-SCNC: 4.5 MMOL/L — SIGNIFICANT CHANGE UP (ref 3.5–5.3)
PROT SERPL-MCNC: 5.1 G/DL — LOW (ref 6–8.3)
PROT UR-MCNC: ABNORMAL
PROTHROM AB SERPL-ACNC: 15.4 SEC — HIGH (ref 10–12.9)
RBC # BLD: 2.63 M/UL — LOW (ref 3.8–5.2)
RBC # BLD: 2.87 M/UL — LOW (ref 3.8–5.2)
RBC # FLD: 18.1 % — HIGH (ref 10.3–14.5)
RBC # FLD: 18.2 % — HIGH (ref 10.3–14.5)
SAO2 % BLDA: 94 % — SIGNIFICANT CHANGE UP (ref 92–96)
SODIUM SERPL-SCNC: 127 MMOL/L — LOW (ref 135–145)
SODIUM SERPL-SCNC: 127 MMOL/L — LOW (ref 135–145)
SP GR SPEC: 1.02 — SIGNIFICANT CHANGE UP (ref 1.01–1.02)
UROBILINOGEN FLD QL: NEGATIVE — SIGNIFICANT CHANGE UP
WBC # BLD: 17 K/UL — HIGH (ref 3.8–10.5)
WBC # BLD: 32.4 K/UL — HIGH (ref 3.8–10.5)
WBC # FLD AUTO: 17 K/UL — HIGH (ref 3.8–10.5)
WBC # FLD AUTO: 32.4 K/UL — HIGH (ref 3.8–10.5)

## 2019-04-18 PROCEDURE — 99233 SBSQ HOSP IP/OBS HIGH 50: CPT

## 2019-04-18 PROCEDURE — 36584 COMPL RPLCMT PICC RS&I: CPT

## 2019-04-18 PROCEDURE — 93010 ELECTROCARDIOGRAM REPORT: CPT

## 2019-04-18 RX ORDER — MEROPENEM 1 G/30ML
1000 INJECTION INTRAVENOUS EVERY 12 HOURS
Qty: 0 | Refills: 0 | Status: DISCONTINUED | OUTPATIENT
Start: 2019-04-18 | End: 2019-04-22

## 2019-04-18 RX ORDER — HYDROCORTISONE 20 MG
100 TABLET ORAL ONCE
Qty: 0 | Refills: 0 | Status: COMPLETED | OUTPATIENT
Start: 2019-04-18 | End: 2019-04-19

## 2019-04-18 RX ORDER — MIDODRINE HYDROCHLORIDE 2.5 MG/1
10 TABLET ORAL ONCE
Qty: 0 | Refills: 0 | Status: COMPLETED | OUTPATIENT
Start: 2019-04-18 | End: 2019-04-18

## 2019-04-18 RX ORDER — ACETAMINOPHEN 500 MG
650 TABLET ORAL ONCE
Qty: 0 | Refills: 0 | Status: COMPLETED | OUTPATIENT
Start: 2019-04-18 | End: 2019-04-18

## 2019-04-18 RX ORDER — SODIUM CHLORIDE 9 MG/ML
250 INJECTION INTRAMUSCULAR; INTRAVENOUS; SUBCUTANEOUS ONCE
Qty: 0 | Refills: 0 | Status: COMPLETED | OUTPATIENT
Start: 2019-04-18 | End: 2019-04-19

## 2019-04-18 RX ORDER — ETHACRYNIC ACID 25 MG/1
25 TABLET ORAL ONCE
Qty: 0 | Refills: 0 | Status: COMPLETED | OUTPATIENT
Start: 2019-04-18 | End: 2019-04-18

## 2019-04-18 RX ORDER — METOPROLOL TARTRATE 50 MG
12.5 TABLET ORAL
Qty: 0 | Refills: 0 | Status: DISCONTINUED | OUTPATIENT
Start: 2019-04-19 | End: 2019-04-19

## 2019-04-18 RX ORDER — SODIUM CHLORIDE 9 MG/ML
250 INJECTION INTRAMUSCULAR; INTRAVENOUS; SUBCUTANEOUS ONCE
Qty: 0 | Refills: 0 | Status: COMPLETED | OUTPATIENT
Start: 2019-04-18 | End: 2019-04-18

## 2019-04-18 RX ORDER — METOPROLOL TARTRATE 50 MG
12.5 TABLET ORAL ONCE
Qty: 0 | Refills: 0 | Status: COMPLETED | OUTPATIENT
Start: 2019-04-18 | End: 2019-04-18

## 2019-04-18 RX ADMIN — Medication 3 MILLILITER(S): at 17:17

## 2019-04-18 RX ADMIN — Medication 3 MILLILITER(S): at 12:10

## 2019-04-18 RX ADMIN — ETHACRYNIC ACID 25 MILLIGRAM(S): 25 TABLET ORAL at 12:09

## 2019-04-18 RX ADMIN — Medication 2: at 09:32

## 2019-04-18 RX ADMIN — Medication 1 APPLICATION(S): at 06:03

## 2019-04-18 RX ADMIN — Medication 500 MILLIGRAM(S): at 12:10

## 2019-04-18 RX ADMIN — BUDESONIDE AND FORMOTEROL FUMARATE DIHYDRATE 2 PUFF(S): 160; 4.5 AEROSOL RESPIRATORY (INHALATION) at 21:02

## 2019-04-18 RX ADMIN — Medication 650 MILLIGRAM(S): at 19:57

## 2019-04-18 RX ADMIN — APIXABAN 5 MILLIGRAM(S): 2.5 TABLET, FILM COATED ORAL at 06:28

## 2019-04-18 RX ADMIN — Medication 3 MILLILITER(S): at 23:51

## 2019-04-18 RX ADMIN — Medication 2 MILLIGRAM(S): at 12:13

## 2019-04-18 RX ADMIN — Medication 250 MILLIGRAM(S): at 09:32

## 2019-04-18 RX ADMIN — Medication 1 DROP(S): at 12:12

## 2019-04-18 RX ADMIN — Medication 1 DROP(S): at 22:04

## 2019-04-18 RX ADMIN — Medication 650 MILLIGRAM(S): at 20:27

## 2019-04-18 RX ADMIN — GABAPENTIN 300 MILLIGRAM(S): 400 CAPSULE ORAL at 09:31

## 2019-04-18 RX ADMIN — QUETIAPINE FUMARATE 25 MILLIGRAM(S): 200 TABLET, FILM COATED ORAL at 23:26

## 2019-04-18 RX ADMIN — MEROPENEM 100 MILLIGRAM(S): 1 INJECTION INTRAVENOUS at 23:48

## 2019-04-18 RX ADMIN — Medication 12.5 MILLIGRAM(S): at 08:35

## 2019-04-18 RX ADMIN — Medication 3 MILLILITER(S): at 06:27

## 2019-04-18 RX ADMIN — TIZANIDINE 4 MILLIGRAM(S): 4 TABLET ORAL at 08:36

## 2019-04-18 RX ADMIN — Medication 1 DROP(S): at 06:03

## 2019-04-18 RX ADMIN — TIZANIDINE 4 MILLIGRAM(S): 4 TABLET ORAL at 21:01

## 2019-04-18 RX ADMIN — Medication 1 APPLICATION(S): at 21:16

## 2019-04-18 RX ADMIN — Medication 1 TABLET(S): at 12:09

## 2019-04-18 RX ADMIN — Medication 12.5 MILLIGRAM(S): at 21:01

## 2019-04-18 RX ADMIN — Medication 25 MILLIGRAM(S): at 08:35

## 2019-04-18 RX ADMIN — Medication 2: at 13:11

## 2019-04-18 RX ADMIN — Medication 300 MILLIGRAM(S): at 12:13

## 2019-04-18 RX ADMIN — Medication 10 MILLIGRAM(S): at 06:28

## 2019-04-18 RX ADMIN — GABAPENTIN 300 MILLIGRAM(S): 400 CAPSULE ORAL at 21:16

## 2019-04-18 RX ADMIN — BUDESONIDE AND FORMOTEROL FUMARATE DIHYDRATE 2 PUFF(S): 160; 4.5 AEROSOL RESPIRATORY (INHALATION) at 08:35

## 2019-04-18 RX ADMIN — SODIUM CHLORIDE 250 MILLILITER(S): 9 INJECTION INTRAMUSCULAR; INTRAVENOUS; SUBCUTANEOUS at 23:38

## 2019-04-18 RX ADMIN — APIXABAN 5 MILLIGRAM(S): 2.5 TABLET, FILM COATED ORAL at 17:18

## 2019-04-18 RX ADMIN — FAMOTIDINE 20 MILLIGRAM(S): 10 INJECTION INTRAVENOUS at 12:13

## 2019-04-18 NOTE — PROGRESS NOTE ADULT - SUBJECTIVE AND OBJECTIVE BOX
---___---___---___---___---___---___ ---___---___---___---___---___---___---___---___---___---                    <<<  M E D I C A L   A T T E N D I N G    F O L L O W    U P   N O T E  >>>    has o2  sat of 88 percent on room air  was given ethacrynic acid and was diuresed     ---___---___---___---___---___---      <<<  MEDICATIONS:  >>>    MEDICATIONS  (STANDING):  ALBUTerol    90 MICROgram(s) HFA Inhaler 1 Puff(s) Inhalation every 4 hours  ALBUTerol/ipratropium for Nebulization 3 milliLiter(s) Nebulizer every 6 hours  apixaban 5 milliGRAM(s) Oral every 12 hours  artificial tears (preservative free) Ophthalmic Solution 1 Drop(s) Both EYES three times a day  ascorbic acid 500 milliGRAM(s) Oral daily  buDESOnide 160 MICROgram(s)/formoterol 4.5 MICROgram(s) Inhaler 2 Puff(s) Inhalation two times a day  clonazePAM Tablet 1.5 milliGRAM(s) Oral <User Schedule>  dextrose 5%. 1000 milliLiter(s) (50 mL/Hr) IV Continuous <Continuous>  dextrose 50% Injectable 12.5 Gram(s) IV Push once  dextrose 50% Injectable 25 Gram(s) IV Push once  dextrose 50% Injectable 25 Gram(s) IV Push once  ergocalciferol 74560 Unit(s) Oral every week  famotidine    Tablet 20 milliGRAM(s) Oral daily  ferrous    sulfate Liquid 300 milliGRAM(s) Enteral Tube daily  gabapentin   Solution 300 milliGRAM(s) Oral two times a day  insulin lispro (HumaLOG) corrective regimen sliding scale   SubCutaneous three times a day before meals  insulin lispro (HumaLOG) corrective regimen sliding scale   SubCutaneous at bedtime  lisinopril 5 milliGRAM(s) Enteral Tube at bedtime  loperamide Solution 2 milliGRAM(s) Enteral Tube daily  Medical Marijuana Oil 1 milliLiter(s) 1 milliLiter(s) Oral <User Schedule>  metoprolol tartrate 12.5 milliGRAM(s) Enteral Tube two times a day  multivitamin/minerals 1 Tablet(s) Oral daily  petrolatum white Ointment 1 Application(s) Topical three times a day  predniSONE   Tablet   Oral   QUEtiapine 25 milliGRAM(s) Oral at bedtime  sodium chloride 0.9% Bolus 500 milliLiter(s) IV Bolus once  tiotropium 18 MICROgram(s) Capsule 1 Capsule(s) Inhalation daily  tiZANidine 4 milliGRAM(s) Oral <User Schedule>  vancomycin  IVPB 1000 milliGRAM(s) IV Intermittent every 24 hours      MEDICATIONS  (PRN):  acetaminophen    Suspension .. 650 milliGRAM(s) Oral every 6 hours PRN Temp greater or equal to 38C (100.4F), Mild Pain (1 - 3), Moderate Pain (4 - 6)  dextrose 40% Gel 15 Gram(s) Oral once PRN Blood Glucose LESS THAN 70 milliGRAM(s)/deciLiter  diphenhydrAMINE   Elixir 25 milliGRAM(s) Oral every 6 hours PRN Rash and/or Itching  glucagon  Injectable 1 milliGRAM(s) IntraMuscular once PRN Glucose <70 milliGRAM(s)/deciLiter  nystatin Powder 1 Application(s) Topical two times a day PRN rash/itchiness  oxyCODONE    IR 5 milliGRAM(s) Oral every 6 hours PRN Moderate Pain (4 - 6)/Severe Pain (7 - 10)  sodium chloride 0.65% Nasal 1 Spray(s) Both Nostrils three times a day PRN Nasal Congestion       ---___---___---___---___---___---     <<<REVIEW OF SYSTEM: >>>    unable to obtain      ---___---___---___---___---___---          <<<  VITAL SIGNS: >>>    T(F): 97.4 (04-18-19 @ 06:26), Max: 98.8 (04-17-19 @ 15:42)  HR: 107 (04-18-19 @ 06:26) (106 - 116)  BP: 109/64 (04-18-19 @ 06:26) (109/64 - 134/76)  RR: 20 (04-18-19 @ 06:26) (17 - 20)  SpO2: 95% (04-18-19 @ 06:26) (94% - 95%)  Wt(kg): --  CAPILLARY BLOOD GLUCOSE      POCT Blood Glucose.: 217 mg/dL (18 Apr 2019 08:56)    I&O's Summary    17 Apr 2019 07:01  -  18 Apr 2019 07:00  --------------------------------------------------------  IN: 1770 mL / OUT: 2775 mL / NET: -1005 mL         ---___---___---___---___---___---                       PHYSICAL EXAM:    GEN: A&O X 0 , NAD , comfortable  HEENT: NCAT, PERRL, MMM, no scleral icterus, hearing intact  NECK: Supple, No JVD  CVS: S1S2 , regular , No M/R/G appreciated  PULM: slight rales noted   ABD.: soft. non tender, non distended,  bowel sounds present peg noted   Extrem: intact pulses , no edema noted left leg in brace  Derm: No rash or ecchymosis noted        ---___---___---___---___---___---     <<<  LAB AND IMAGING: >>>                          8.2    17.0  )-----------( 277      ( 18 Apr 2019 06:39 )             25.1               04-17    125<L>  |  88<L>  |  40<H>  ----------------------------<  114<H>  4.7   |  26  |  <0.30<L>    Ca    7.5<L>      17 Apr 2019 07:15  Mg     1.9     04-17                                 [All pertinent / recent available Imaging reports and other labs reviewed]     ---___---___---___---___---___---___ ---___---___---___---___---           <<<  A S S E S S M E N T   A N D   P L A N :  >>>  leukocytosis is chronic not from an acute infectious source   hypoxia on ethacrynic acid and will continue peg ethacrynic acid    picc line needs to be repositioned   dementia  supportive car e  chf on lisinopril and toprol   fracture of left leg  on medical marijuana and oxycodone  for inoperable pain  hyperglycemia  continue current treatment   asthma on budesonide       -GI/DVT Prophylaxis. on eliquis     --------------------------------------------  Case discussed with   Education given on     >>______________________<<      Deniz Bolden .         phone   [phone]

## 2019-04-18 NOTE — CONSULT NOTE ADULT - SUBJECTIVE AND OBJECTIVE BOX
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 History was provided by patient's daughters, Tere and Angie at bedside. Patient has been having persistent fever at home up to 103F. She has appeared more lethargic for the last 2 days and has had labored breathing, but no cough. She has loose nonbloody stools. No recent emesis, melena or hematuria. Urine output via gavin catheter is normal yellow appearance and had "good output." Patient gets regular wound care but has persistent ulcers. Because of persistent fevers, pt was brought to ED.     This patient was previously hospitalized from -3/18/2019 for fevers, found to have septic shock 2/2 UTI vs sacral ulcer vs PNA, requiring pressor support and MICU stay. 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 Offer was made to change the spacer and get cultures, however at that time, the  did not want to put the patient through surgery. Pt completed a course of meropenem, and was resumed on suppressive minocycline.    In the ED, T 100.8 Rectal  , /63, RR 14, SpO2 98%RA  MEETS SIRS CRITERIA based on HR and temperature.   She was given IVNS 1.5L, acetaminophen 650mg and meropenem 1g. (24 Mar 2019 20:54)    Consult called for hypotension, tachypnea and sepsis picture. Pts  at bedside, pt febrile while in IR for PICC exchange today. Was also tachycardic to 's earlier, now improved to 100's. Bp 87/55. Pt nonverbal, unable to provide history. Per , pt had PICC line placed ~1 week ago, was exchanged today. Pt has had UTI's in the past due to chronic Gavin catheter, last replaced 3/25 during this hospital admission. Getting 250 cc NS bolus by primary team currently. Had been on vancomycin and was preparing to be discharged home via PICC line, but now meropenem has been added for positive UA.     FAMILY HISTORY:  Family history of dementia (Grandparent)  Family history of colon cancer (Grandparent)      SOCIAL HISTORY:  Smoking: __ packs x ___ years  EtOH Use:  Marital Status:  Occupation:  Recent Travel:  Country of Birth:  Advance Directives:    Allergies    ASA; dye contrast (Anaphylaxis)  aspirin (Short breath)  divalproex sodium (Other (Unknown))  Flowers and Plants (Short breath)  Haldol (Other (Unknown))  penicillin (Short breath; Rash)  sulfa drugs (Short breath; Rash)  vancomycin (Rash; Urticaria; Hives)  Xanax (Other (Unknown))    Intolerances        HOME MEDICATIONS:    REVIEW OF SYSTEMS:  [X ] Unable to assess ROS because advanced dementia, nonverbal.     OBJECTIVE:  ICU Vital Signs Last 24 Hrs  T(C): 36.7 (2019 20:50), Max: 37.7 (2019 20:06)  T(F): 98.1 (2019 20:50), Max: 99.8 (2019 20:06)  HR: 104 (2019 23:32) (104 - 132)  BP: 82/40 (2019 23:32) (82/40 - 110/59)  BP(mean): --  ABP: --  ABP(mean): --  RR: 20 (2019 20:50) (20 - 20)  SpO2: 100% (2019 20:50) (95% - 100%)         @ 07: @ 07:00  --------------------------------------------------------  IN: 1770 mL / OUT: 2775 mL / NET: -1005 mL     @ : @ 23:48  --------------------------------------------------------  IN: 900 mL / OUT: 0 mL / NET: 900 mL      CAPILLARY BLOOD GLUCOSE      POCT Blood Glucose.: 229 mg/dL (2019 21:41)      PHYSICAL EXAM:  General: nonverbal, frail contracted extremities, appears older than age  Respiratory: coarse breath sounds bilaterally; no wheezing  Cardiovascular: tachycardic; no murmur. No lower extremity edema  Abdomen: +PEG tube, soft, nontender, nondistended, normal bowel sounds  Extremities: Contracted extremities, Left leg in brace  Skin: No visible rashes  Neurological: Arousable to voice, opens eyes, but does not follow commands  Psychiatry: A&O x 0    HOSPITAL MEDICATIONS:  MEDICATIONS  (STANDING):  ALBUTerol    90 MICROgram(s) HFA Inhaler 1 Puff(s) Inhalation every 4 hours  ALBUTerol/ipratropium for Nebulization 3 milliLiter(s) Nebulizer every 6 hours  apixaban 5 milliGRAM(s) Oral every 12 hours  artificial tears (preservative free) Ophthalmic Solution 1 Drop(s) Both EYES three times a day  ascorbic acid 500 milliGRAM(s) Oral daily  buDESOnide 160 MICROgram(s)/formoterol 4.5 MICROgram(s) Inhaler 2 Puff(s) Inhalation two times a day  clonazePAM Tablet 1.5 milliGRAM(s) Oral <User Schedule>  dextrose 5%. 1000 milliLiter(s) (50 mL/Hr) IV Continuous <Continuous>  dextrose 50% Injectable 12.5 Gram(s) IV Push once  dextrose 50% Injectable 25 Gram(s) IV Push once  dextrose 50% Injectable 25 Gram(s) IV Push once  ergocalciferol 89430 Unit(s) Oral every week  famotidine    Tablet 20 milliGRAM(s) Oral daily  ferrous    sulfate Liquid 300 milliGRAM(s) Enteral Tube daily  gabapentin   Solution 300 milliGRAM(s) Oral two times a day  insulin lispro (HumaLOG) corrective regimen sliding scale   SubCutaneous three times a day before meals  insulin lispro (HumaLOG) corrective regimen sliding scale   SubCutaneous at bedtime  loperamide Solution 2 milliGRAM(s) Enteral Tube daily  Medical Marijuana Oil 1 milliLiter(s) 1 milliLiter(s) Oral <User Schedule>  meropenem  IVPB 1000 milliGRAM(s) IV Intermittent every 12 hours  multivitamin/minerals 1 Tablet(s) Oral daily  petrolatum white Ointment 1 Application(s) Topical three times a day  predniSONE   Tablet   Oral   QUEtiapine 25 milliGRAM(s) Oral at bedtime  sodium chloride 0.9% Bolus 500 milliLiter(s) IV Bolus once  tiotropium 18 MICROgram(s) Capsule 1 Capsule(s) Inhalation daily  tiZANidine 4 milliGRAM(s) Oral <User Schedule>  vancomycin  IVPB 1000 milliGRAM(s) IV Intermittent every 24 hours    MEDICATIONS  (PRN):  acetaminophen    Suspension .. 650 milliGRAM(s) Oral every 6 hours PRN Temp greater or equal to 38C (100.4F), Mild Pain (1 - 3), Moderate Pain (4 - 6)  dextrose 40% Gel 15 Gram(s) Oral once PRN Blood Glucose LESS THAN 70 milliGRAM(s)/deciLiter  diphenhydrAMINE   Elixir 25 milliGRAM(s) Oral every 6 hours PRN Rash and/or Itching  glucagon  Injectable 1 milliGRAM(s) IntraMuscular once PRN Glucose <70 milliGRAM(s)/deciLiter  nystatin Powder 1 Application(s) Topical two times a day PRN rash/itchiness  oxyCODONE    IR 5 milliGRAM(s) Oral every 6 hours PRN Moderate Pain (4 - 6)/Severe Pain (7 - 10)  sodium chloride 0.65% Nasal 1 Spray(s) Both Nostrils three times a day PRN Nasal Congestion      LABS:                        9.2    32.4  )-----------( 369      ( 2019 22:06 )             27.2     04-18    127<L>  |  89<L>  |  46<H>  ----------------------------<  226<H>  4.5   |  23  |  0.30<L>    Ca    8.7      2019 22:06  Mg     1.9     04-17    TPro  5.1<L>  /  Alb  2.6<L>  /  TBili  0.4  /  DBili  x   /  AST  23  /  ALT  21  /  AlkPhos  125<H>  04-18    PT/INR - ( 2019 11:57 )   PT: 15.4 sec;   INR: 1.33 ratio         PTT - ( 2019 11:57 )  PTT:33.4 sec  Urinalysis Basic - ( 2019 22:26 )    Color: Yellow / Appearance: Slightly Turbid / S.019 / pH: x  Gluc: x / Ketone: Negative  / Bili: Negative / Urobili: Negative   Blood: x / Protein: Trace / Nitrite: Negative   Leuk Esterase: Large / RBC: 5 /hpf /  /HPF   Sq Epi: x / Non Sq Epi: 0 / Bacteria: Negative      Arterial Blood Gas:   @ 22:20  7.54/30/64/25/94/2.9  ABG lactate: --        MICROBIOLOGY:     RADIOLOGY:  [X ] Reviewed and interpreted by me    CXR 19  IMPRESSION:   Decreased left pleural effusion/underlying atelectasis.  Elevation of the right hemidiaphragm.    Left-sided PICC line with tip in the azygos vein.    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 History was provided by patient's daughters, Tere and Angie at bedside. Patient has been having persistent fever at home up to 103F. She has appeared more lethargic for the last 2 days and has had labored breathing, but no cough. She has loose nonbloody stools. No recent emesis, melena or hematuria. Urine output via gavin catheter is normal yellow appearance and had "good output." Patient gets regular wound care but has persistent ulcers. Because of persistent fevers, pt was brought to ED.     This patient was previously hospitalized from -3/18/2019 for fevers, found to have septic shock 2/2 UTI vs sacral ulcer vs PNA, requiring pressor support and MICU stay. 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 Offer was made to change the spacer and get cultures, however at that time, the  did not want to put the patient through surgery. Pt completed a course of meropenem, and was resumed on suppressive minocycline.    In the ED, T 100.8 Rectal  , /63, RR 14, SpO2 98%RA  MEETS SIRS CRITERIA based on HR and temperature.   She was given IVNS 1.5L, acetaminophen 650mg and meropenem 1g. (24 Mar 2019 20:54)    Consult called for hypotension, tachypnea and sepsis picture. Pts  at bedside, pt febrile while in IR for PICC exchange today. Was also tachycardic to 's earlier, now improved to 100's. Bp 87/55. Pt nonverbal, unable to provide history. Per , pt had PICC line placed ~1 week ago, was exchanged today. Pt has had UTI's in the past due to chronic Gavin catheter, last replaced 3/25 during this hospital admission. Getting 250 cc NS bolus by primary team currently. Had been on vancomycin and was preparing to be discharged home via PICC line, but now meropenem has been added for positive UA.     FAMILY HISTORY:  Family history of dementia (Grandparent)  Family history of colon cancer (Grandparent)      SOCIAL HISTORY:  Social History (marital status, living situation, occupation, tobacco use, alcohol and drug use, and sexual history): The patient is functionally quadriplegic.   She lives with her .  She has 2 daughters. Patient has no history of tobacco, alcohol or drug use. Full Code. 	      Allergies    ASA; dye contrast (Anaphylaxis)  aspirin (Short breath)  divalproex sodium (Other (Unknown))  Flowers and Plants (Short breath)  Haldol (Other (Unknown))  penicillin (Short breath; Rash)  sulfa drugs (Short breath; Rash)  vancomycin (Rash; Urticaria; Hives)  Xanax (Other (Unknown))    Intolerances        HOME MEDICATIONS:    REVIEW OF SYSTEMS:  [X ] Unable to assess ROS because advanced dementia, nonverbal.     OBJECTIVE:  ICU Vital Signs Last 24 Hrs  T(C): 36.7 (2019 20:50), Max: 37.7 (2019 20:06)  T(F): 98.1 (2019 20:50), Max: 99.8 (2019 20:06)  HR: 104 (2019 23:32) (104 - 132)  BP: 82/40 (2019 23:32) (82/40 - 110/59)  BP(mean): --  ABP: --  ABP(mean): --  RR: 20 (2019 20:50) (20 - 20)  SpO2: 100% (2019 20:50) (95% - 100%)         @ 07: @ 07:00  --------------------------------------------------------  IN: 1770 mL / OUT: 2775 mL / NET: -1005 mL     @ 07: @ 23:48  --------------------------------------------------------  IN: 900 mL / OUT: 0 mL / NET: 900 mL      CAPILLARY BLOOD GLUCOSE      POCT Blood Glucose.: 229 mg/dL (2019 21:41)      PHYSICAL EXAM:  General: nonverbal, frail contracted extremities, appears older than age  Respiratory: coarse breath sounds bilaterally; no wheezing  Cardiovascular: tachycardic; no murmur. No lower extremity edema  Abdomen: +PEG tube, soft, nontender, nondistended, normal bowel sounds  Extremities: Contracted extremities, Left leg in brace  Skin: No visible rashes  Neurological: Arousable to voice, opens eyes, but does not follow commands  Psychiatry: A&O x 0    HOSPITAL MEDICATIONS:  MEDICATIONS  (STANDING):  ALBUTerol    90 MICROgram(s) HFA Inhaler 1 Puff(s) Inhalation every 4 hours  ALBUTerol/ipratropium for Nebulization 3 milliLiter(s) Nebulizer every 6 hours  apixaban 5 milliGRAM(s) Oral every 12 hours  artificial tears (preservative free) Ophthalmic Solution 1 Drop(s) Both EYES three times a day  ascorbic acid 500 milliGRAM(s) Oral daily  buDESOnide 160 MICROgram(s)/formoterol 4.5 MICROgram(s) Inhaler 2 Puff(s) Inhalation two times a day  clonazePAM Tablet 1.5 milliGRAM(s) Oral <User Schedule>  dextrose 5%. 1000 milliLiter(s) (50 mL/Hr) IV Continuous <Continuous>  dextrose 50% Injectable 12.5 Gram(s) IV Push once  dextrose 50% Injectable 25 Gram(s) IV Push once  dextrose 50% Injectable 25 Gram(s) IV Push once  ergocalciferol 54069 Unit(s) Oral every week  famotidine    Tablet 20 milliGRAM(s) Oral daily  ferrous    sulfate Liquid 300 milliGRAM(s) Enteral Tube daily  gabapentin   Solution 300 milliGRAM(s) Oral two times a day  insulin lispro (HumaLOG) corrective regimen sliding scale   SubCutaneous three times a day before meals  insulin lispro (HumaLOG) corrective regimen sliding scale   SubCutaneous at bedtime  loperamide Solution 2 milliGRAM(s) Enteral Tube daily  Medical Marijuana Oil 1 milliLiter(s) 1 milliLiter(s) Oral <User Schedule>  meropenem  IVPB 1000 milliGRAM(s) IV Intermittent every 12 hours  multivitamin/minerals 1 Tablet(s) Oral daily  petrolatum white Ointment 1 Application(s) Topical three times a day  predniSONE   Tablet   Oral   QUEtiapine 25 milliGRAM(s) Oral at bedtime  sodium chloride 0.9% Bolus 500 milliLiter(s) IV Bolus once  tiotropium 18 MICROgram(s) Capsule 1 Capsule(s) Inhalation daily  tiZANidine 4 milliGRAM(s) Oral <User Schedule>  vancomycin  IVPB 1000 milliGRAM(s) IV Intermittent every 24 hours    MEDICATIONS  (PRN):  acetaminophen    Suspension .. 650 milliGRAM(s) Oral every 6 hours PRN Temp greater or equal to 38C (100.4F), Mild Pain (1 - 3), Moderate Pain (4 - 6)  dextrose 40% Gel 15 Gram(s) Oral once PRN Blood Glucose LESS THAN 70 milliGRAM(s)/deciLiter  diphenhydrAMINE   Elixir 25 milliGRAM(s) Oral every 6 hours PRN Rash and/or Itching  glucagon  Injectable 1 milliGRAM(s) IntraMuscular once PRN Glucose <70 milliGRAM(s)/deciLiter  nystatin Powder 1 Application(s) Topical two times a day PRN rash/itchiness  oxyCODONE    IR 5 milliGRAM(s) Oral every 6 hours PRN Moderate Pain (4 - 6)/Severe Pain (7 - 10)  sodium chloride 0.65% Nasal 1 Spray(s) Both Nostrils three times a day PRN Nasal Congestion      LABS:                        9.2    32.4  )-----------( 369      ( 2019 22:06 )             27.2     04-18    127<L>  |  89<L>  |  46<H>  ----------------------------<  226<H>  4.5   |  23  |  0.30<L>    Ca    8.7      2019 22:06  Mg     1.9     04-17    TPro  5.1<L>  /  Alb  2.6<L>  /  TBili  0.4  /  DBili  x   /  AST  23  /  ALT  21  /  AlkPhos  125<H>  04-18    PT/INR - ( 2019 11:57 )   PT: 15.4 sec;   INR: 1.33 ratio         PTT - ( 2019 11:57 )  PTT:33.4 sec  Urinalysis Basic - ( 2019 22:26 )    Color: Yellow / Appearance: Slightly Turbid / S.019 / pH: x  Gluc: x / Ketone: Negative  / Bili: Negative / Urobili: Negative   Blood: x / Protein: Trace / Nitrite: Negative   Leuk Esterase: Large / RBC: 5 /hpf /  /HPF   Sq Epi: x / Non Sq Epi: 0 / Bacteria: Negative      Arterial Blood Gas:   @ 22:20  7.54/30/64/25/94/2.9  ABG lactate: --        MICROBIOLOGY:     RADIOLOGY:  [X ] Reviewed and interpreted by me    CXR 19  IMPRESSION:   Decreased left pleural effusion/underlying atelectasis.  Elevation of the right hemidiaphragm.    Left-sided PICC line with tip in the azygos vein.    EKG:

## 2019-04-18 NOTE — CHART NOTE - NSCHARTNOTEFT_GEN_A_CORE
CC: Tachycardia    Event Summary:  Notified by RN for tachycardia, HR 130s. Of note, patient febrile earlier this evening. Patient's metoprolol 12.5mg BID was discontinued today, for hypotension SBP 90s over previous night. Patient seen and examined at bedside, in NAD. Patient nonverbal at baseline, unable to contribute to ROS.    Vital Signs Last 24 Hrs  T(C): 36.7 (18 Apr 2019 20:50), Max: 37.7 (18 Apr 2019 20:06)  T(F): 98.1 (18 Apr 2019 20:50), Max: 99.8 (18 Apr 2019 20:06)  HR: 132 (18 Apr 2019 20:50) (107 - 132)  BP: 110/59 (18 Apr 2019 20:50) (106/62 - 110/59)  BP(mean): --  RR: 20 (18 Apr 2019 20:50) (20 - 20)  SpO2: 100% (18 Apr 2019 20:50) (95% - 100%)      Physical Exam:  Gner CC: Tachycardia    Event Summary:  Notified by RN for tachycardia, HR 130s. Of note, patient febrile earlier this evening. Patient's metoprolol 12.5mg BID was discontinued today, for hypotension SBP 90s over previous night. Patient seen and examined at bedside, in NAD. Patient nonverbal at baseline, unable to contribute to ROS.    Vital Signs Last 24 Hrs  T(C): 36.7 (18 Apr 2019 20:50), Max: 37.7 (18 Apr 2019 20:06)  T(F): 98.1 (18 Apr 2019 20:50), Max: 99.8 (18 Apr 2019 20:06)  HR: 132 (18 Apr 2019 20:50) (107 - 132)  BP: 110/59 (18 Apr 2019 20:50) (106/62 - 110/59)  BP(mean): --  RR: 20 (18 Apr 2019 20:50) (20 - 20)  SpO2: 100% (18 Apr 2019 20:50) (95% - 100%)      Physical Exam:  General: contracted, thin, chronically ill-appearing female. CC: Tachycardia    Event Summary:  Notified by RN for tachycardia, HR 130s. Of note, patient febrile earlier this evening. Patient's metoprolol 12.5mg BID was discontinued today, for hypotension SBP 90s over previous night. Patient seen and examined at bedside, in NAD. Patient nonverbal at baseline, unable to contribute to ROS.    Vital Signs Last 24 Hrs  T(C): 36.7 (18 Apr 2019 20:50), Max: 37.7 (18 Apr 2019 20:06)  T(F): 98.1 (18 Apr 2019 20:50), Max: 99.8 (18 Apr 2019 20:06)  HR: 132 (18 Apr 2019 20:50) (107 - 132)  BP: 110/59 (18 Apr 2019 20:50) (106/62 - 110/59)  BP(mean): --  RR: 20 (18 Apr 2019 20:50) (20 - 20)  SpO2: 100% (18 Apr 2019 20:50) (95% - 100%)    Physical Exam:  General: contracted, thin, chronically ill-appearing female.  Card: tachycardic, regular, S1/S2  Pulm: mild rales b/l, tachypneic, NC in place  Abd: soft, nondistended, nontender  Ext: 1+ edema in LLE      A/P:  68 yr old with advanced dementia , bed bound, contracted, cva, sp removal of infected hip due to mrsa with placement of space last fall.   Pt has been havening fevers for months.     # Tachycardia CC: Tachycardia    Event Summary:  Notified by RN for tachycardia, HR 130s. Of note, patient febrile earlier this evening. Patient's metoprolol 12.5mg BID was discontinued today, for hypotension SBP 90s over previous night. Patient seen and examined at bedside, in NAD. Patient nonverbal at baseline, unable to contribute to ROS.    Vital Signs Last 24 Hrs  T(C): 36.7 (18 Apr 2019 20:50), Max: 37.7 (18 Apr 2019 20:06)  T(F): 98.1 (18 Apr 2019 20:50), Max: 99.8 (18 Apr 2019 20:06)  HR: 132 (18 Apr 2019 20:50) (107 - 132)  BP: 110/59 (18 Apr 2019 20:50) (106/62 - 110/59)  BP(mean): --  RR: 20 (18 Apr 2019 20:50) (20 - 20)  SpO2: 100% (18 Apr 2019 20:50) (95% - 100%)    Physical Exam:  General: contracted, thin, chronically ill-appearing female.  Card: tachycardic, regular, S1/S2  Pulm: mild rales b/l, tachypneic, NC in place  Abd: soft, nondistended, nontender  Ext: 1+ edema in LLE      A/P:  68 yr old with advanced dementia , bed bound, contracted, cva, sp removal of infected hip due to mrsa with placement of space last fall. Pt has been havening fevers for months.     # Tachycardia  - Tylenol given in setting of recent fever.  - Stat EKG shows sinus tach, HR 130s.  - Metoprolol 12.5mg BID resumed given resolved hypotension.  - Sepsis work-up ordered: blood cx, UA/UCx, stat CBC/CMP, lactate, and ABG given development of tachypnea today.  Case discussed with Dr. Bolden who agrees with plan of care.      BRET Hilario CC: Tachycardia    Event Summary:  Notified by RN for tachycardia, HR 130s. Of note, patient febrile earlier this evening. Patient's metoprolol 12.5mg BID was discontinued today, for hypotension SBP 90s over previous night. Patient seen and examined at bedside, in NAD. Patient nonverbal at baseline, unable to contribute to ROS.    Vital Signs Last 24 Hrs  T(C): 36.7 (18 Apr 2019 20:50), Max: 37.7 (18 Apr 2019 20:06)  T(F): 98.1 (18 Apr 2019 20:50), Max: 99.8 (18 Apr 2019 20:06)  HR: 132 (18 Apr 2019 20:50) (107 - 132)  BP: 110/59 (18 Apr 2019 20:50) (106/62 - 110/59)  BP(mean): --  RR: 20 (18 Apr 2019 20:50) (20 - 20)  SpO2: 100% (18 Apr 2019 20:50) (95% - 100%)    Physical Exam:  General: contracted, thin, chronically ill-appearing female.  Card: tachycardic, regular, S1/S2  Pulm: mild rales b/l, tachypneic, NC in place  Abd: soft, nondistended, nontender  Ext: 1+ edema in LLE      A/P:  68 yr old with advanced dementia , bed bound, contracted, cva, sp removal of infected hip due to mrsa with placement of space last fall. Pt has been havening fevers for months.     # Tachycardia  - Tylenol given in setting of recent fever.  - Stat EKG shows sinus tach, HR 130s.  - Metoprolol 12.5mg BID resumed given resolved hypotension.  - Sepsis work-up ordered: blood cx, UA/UCx, stat CBC/CMP, lactate, and ABG given development of tachypnea today.  Case discussed with Dr. Bolden who agrees with plan of care.      Sasha Blakely PA-C  Department of Medicine  #[id number]      ADDENDUM:  Labs significant for WBC 17->32, lactate 3.6, UA (+)LE, ABG: pH 7.54, pCO2 30 -> resp alkalosis  Patient now hypotensive to 82/40 s/p metoprolol.  - 250cc NS bolus given (hx HFrEF).  - Klonopin held tonight for hypotension.  - Meropenem restarted to cover for UTI. F/u UCx.  - MICU consulted. Recommending additional 250cc NS bolus, midodrine 10mg, solucortef IV 100mg, and repeat CXR.  - Continue to monitor patient closely.   Dr. Bolden notified and aware of results. CC: Tachycardia    Event Summary:  Notified by RN for tachycardia, HR 130s. Of note, patient febrile earlier this evening. Patient's metoprolol 12.5mg BID was discontinued today, for hypotension SBP 90s over previous night. Patient seen and examined at bedside, in NAD. Patient nonverbal at baseline, unable to contribute to ROS.    Vital Signs Last 24 Hrs  T(C): 36.7 (18 Apr 2019 20:50), Max: 37.7 (18 Apr 2019 20:06)  T(F): 98.1 (18 Apr 2019 20:50), Max: 99.8 (18 Apr 2019 20:06)  HR: 132 (18 Apr 2019 20:50) (107 - 132)  BP: 110/59 (18 Apr 2019 20:50) (106/62 - 110/59)  BP(mean): --  RR: 20 (18 Apr 2019 20:50) (20 - 20)  SpO2: 100% (18 Apr 2019 20:50) (95% - 100%)    Physical Exam:  General: contracted, thin, chronically ill-appearing female.  Card: tachycardic, regular, S1/S2  Pulm: mild rales b/l, tachypneic, NC in place  Abd: soft, nondistended, nontender  Ext: 1+ edema in LLE      A/P:  68 yr old with advanced dementia , bed bound, contracted, cva, sp removal of infected hip due to mrsa with placement of space last fall. Pt has been havening fevers for months.     # Tachycardia  - Tylenol given in setting of recent fever.  - Stat EKG shows sinus tach, HR 130s.  - Metoprolol 12.5mg BID resumed given resolved hypotension.  - Sepsis work-up ordered: blood cx, UA/UCx, stat CBC/CMP, lactate, and ABG given development of tachypnea today.  Case discussed with Dr. Bolden who agrees with plan of care.      Sasha Blakely PA-C  Department of Medicine  #[id number]      ADDENDUM:  Labs significant for WBC 17->32, lactate 3.6, UA (+)LE, ABG: pH 7.54, pCO2 30 -> resp alkalosis  Patient now hypotensive to 82/40 s/p metoprolol.  - 250cc NS bolus given (hx HFrEF).  - Klonopin held tonight for hypotension.  - Meropenem restarted to cover for UTI. F/u UCx.  - MICU consulted. Recommending additional 250cc NS bolus, midodrine 10mg, solucortef IV 100mg, and repeat CXR.  - Continue to monitor patient closely.   Dr. Bolden notified and aware of results.      ADDENDUM2:  Patient remains hypotensive to SBP80s. CC: Tachycardia    Event Summary:  Notified by RN for tachycardia, HR 130s. Of note, patient febrile earlier this evening. Patient's metoprolol 12.5mg BID was discontinued today, for hypotension SBP 90s over previous night. Patient seen and examined at bedside, in NAD. Patient nonverbal at baseline, unable to contribute to ROS.    Vital Signs Last 24 Hrs  T(C): 36.7 (18 Apr 2019 20:50), Max: 37.7 (18 Apr 2019 20:06)  T(F): 98.1 (18 Apr 2019 20:50), Max: 99.8 (18 Apr 2019 20:06)  HR: 132 (18 Apr 2019 20:50) (107 - 132)  BP: 110/59 (18 Apr 2019 20:50) (106/62 - 110/59)  BP(mean): --  RR: 20 (18 Apr 2019 20:50) (20 - 20)  SpO2: 100% (18 Apr 2019 20:50) (95% - 100%)    Physical Exam:  General: contracted, thin, chronically ill-appearing female.  Card: tachycardic, regular, S1/S2  Pulm: mild rales b/l, tachypneic, NC in place  Abd: soft, nondistended, nontender  Ext: 1+ edema in LLE      A/P:  68 yr old with advanced dementia , bed bound, contracted, cva, sp removal of infected hip due to mrsa with placement of space last fall. Pt has been havening fevers for months.     # Tachycardia  - Tylenol given in setting of recent fever.  - Stat EKG shows sinus tach, HR 130s.  - Metoprolol 12.5mg BID resumed given resolved hypotension.  - Sepsis work-up ordered: blood cx, UA/UCx, stat CBC/CMP, lactate, and ABG given development of tachypnea today.  Case discussed with Dr. Bolden who agrees with plan of care.      Sasha Blakely PA-C  Department of Medicine  #[id number]      ADDENDUM:  Labs significant for WBC 17->32, lactate 3.6, UA (+)LE, ABG: pH 7.54, pCO2 30 -> resp alkalosis  Patient now hypotensive to 82/40 s/p metoprolol.  - 250cc NS bolus given (hx HFrEF).  - Klonopin held tonight for hypotension.  - Meropenem restarted to cover for UTI. F/u UCx.  - MICU consulted. Recommending additional 250cc NS bolus, midodrine 10mg, solucortef IV 100mg, and repeat CXR.  - Continue to monitor patient closely.   Dr. Bolden notified and aware of results.      ADDENDUM2:  Patient remains hypotensive to SBP80s, after receiving total 500cc NS bolus. As per MICU recs, additional 250cc bolus ordered. BP then improved to 100/61.   Per MICU attending recs, may give additional 250cc if pt still hypotensive as pt not fluid overloaded on bedside POCUS. Also start hypertonic saline and albuterol inhalation for copious oral secretions, as well as frequent chest PT. CC: Tachycardia    Event Summary:  Notified by RN for tachycardia, HR 130s. Of note, patient febrile earlier this evening. Patient's metoprolol 12.5mg BID was discontinued today, for hypotension SBP 90s over previous night. Patient seen and examined at bedside, in NAD. Patient nonverbal at baseline, unable to contribute to ROS.    Vital Signs Last 24 Hrs  T(C): 36.7 (18 Apr 2019 20:50), Max: 37.7 (18 Apr 2019 20:06)  T(F): 98.1 (18 Apr 2019 20:50), Max: 99.8 (18 Apr 2019 20:06)  HR: 132 (18 Apr 2019 20:50) (107 - 132)  BP: 110/59 (18 Apr 2019 20:50) (106/62 - 110/59)  BP(mean): --  RR: 20 (18 Apr 2019 20:50) (20 - 20)  SpO2: 100% (18 Apr 2019 20:50) (95% - 100%)    Physical Exam:  General: contracted, thin, chronically ill-appearing female.  Card: tachycardic, regular, S1/S2  Pulm: mild rales b/l, tachypneic, NC in place  Abd: soft, nondistended, nontender  Ext: 1+ edema in LLE      A/P:  68 yr old with advanced dementia , bed bound, contracted, cva, sp removal of infected hip due to mrsa with placement of space last fall. Pt has been havening fevers for months.     # Tachycardia  - Tylenol given in setting of recent fever.  - Stat EKG shows sinus tach, HR 130s.  - Metoprolol 12.5mg BID resumed given resolved hypotension.  - Sepsis work-up ordered: blood cx, UA/UCx, stat CBC/CMP, lactate, and ABG given development of tachypnea today.  Case discussed with Dr. Bolden who agrees with plan of care.      Sasha Blakely PA-C  Department of Medicine  #[id number]      ADDENDUM:  Labs significant for WBC 17->32, lactate 3.6, UA (+)LE, ABG: pH 7.54, pCO2 30 -> resp alkalosis  Patient now hypotensive to 82/40 s/p metoprolol.  - 250cc NS bolus given (hx HFrEF).  - Klonopin held tonight for hypotension.  - Meropenem restarted to cover for UTI. F/u UCx.  - MICU consulted. Recommending additional 250cc NS bolus, midodrine 10mg, solucortef IV 100mg, and repeat CXR.  - Continue to monitor patient closely.   Dr. Bolden notified and aware of results.      ADDENDUM2:  Patient remains hypotensive to SBP80s, after receiving total 500cc NS bolus. As per MICU recs, additional 250cc bolus ordered, metoprolol d'gracie again. BP then improved to 100/61.   Per MICU attending recs, may give additional 250cc if pt still hypotensive as pt not fluid overloaded on bedside POCUS. Also start hypertonic saline and albuterol inhalation for copious oral secretions, as well as frequent chest PT.

## 2019-04-18 NOTE — CHART NOTE - NSCHARTNOTEFT_GEN_A_CORE
Vascular & Interventional Radiology Pre-Procedure Note    Procedure Name: PICC exchange    HPI: 68y Female with dislodged picc    Allergies: ASA; dye contrast (Anaphylaxis)  aspirin (Short breath)  divalproex sodium (Other (Unknown))  Flowers and Plants (Short breath)  Haldol (Other (Unknown))  penicillin (Short breath; Rash)  sulfa drugs (Short breath; Rash)  vancomycin (Rash; Urticaria; Hives)  Xanax (Other (Unknown))    Medications (Abx/Cardiac/Anticoagulation/Blood Products)    apixaban: 5 milliGRAM(s) Oral (04-18 @ 17:18)  ethacrynic acid: 25 milliGRAM(s) Oral (04-18 @ 12:09)  ethacrynic acid Injectable: 25 milliGRAM(s) IV Push (04-17 @ 16:16)  lisinopril: 5 milliGRAM(s) Enteral Tube (04-17 @ 21:29)  metoprolol tartrate: 12.5 milliGRAM(s) Enteral Tube (04-18 @ 08:35)  vancomycin  IVPB: 250 mL/Hr IV Intermittent (04-18 @ 09:32)    Data:    T(C): 36.3  HR: 107  BP: 109/64  RR: 20  SpO2: 95%    Exam  General: NAD  Chest: non labored  Abdomen: na  Extremities: na    -WBC 17.0 / HgB 8.2 / Hct 25.1 / Plt 277  -Na 127 / Cl 92 / BUN 39 / Glucose 170  -K 4.5 / CO2 23 / Cr 0.30  -ALT -- / Alk Phos -- / T.Bili --  -INR1.33    Imaging: none    Plan:   -68y Female presents for picc exchange  -Risks/Benefits/alternatives explained with the patient and/or healthcare proxy and witnessed informed consent obtained.

## 2019-04-18 NOTE — CONSULT NOTE ADULT - ASSESSMENT
68F with advanced dementia (nonverbal, dysphagia w/ PEG tube), CHF, asthma on chronic steroids, left leg fracture, sacral stage 4 ulcer being treated for osteomyelitis, MICU consulted for sepsis with hypotension, tachycardia and tachypnea.    #Sepsis  Pt febrile today (Tmax 99.8 documented on sunrise but reported to be 101F by primary team today), along with HR increased to 130 (now improved to 106), tachypnea/hypoxia (requiring 3L O2), and jump in WBC count from 17k to 32k today.   Suspected source UTI (positive UA and chronic indwelling Blackman), vs. PNA vs other source.     Last BCx NGTD on 4/8.  Recommend:  - repeat blood and urine cultures  - repeat CXR to assess for fluid overload and also to check for consolidations- eval for aspiration PNA  - give 100 mg IV solucortef (stress dose steroids) and 10 mg midodrine via PEG tube now  - 500 cc IV NS bolus.         Vicki Jaffe MD PGY2  MICU Resident  #[id number] 68F with advanced dementia (nonverbal, dysphagia w/ PEG tube), CHF, asthma on chronic steroids, left leg fracture, sacral stage 4 ulcer being treated for osteomyelitis, MICU consulted for sepsis with hypotension, tachycardia and tachypnea.    #Sepsis  Pt febrile today (Tmax 99.8 documented on sunrise but reported to be 101F by primary team today), along with HR increased to 130 (now improved to 106), tachypnea/hypoxia (requiring 3L O2), and jump in WBC count from 17k to 32k today.   Suspected source UTI (positive UA and chronic indwelling Blackman), vs. PNA vs other source.     Last BCx NGTD on 4/8.  Recommend:  - repeat blood and urine cultures  - repeat CXR to assess for fluid overload and also to check for consolidations- eval for aspiration PNA  - give 100 mg IV solucortef (stress dose steroids) and 10 mg midodrine via PEG tube now  - 500 cc IV NS bolus.   - hold metoprolol tartrate due to hypotension.         Vicki Jaffe MD PGY2  MICU Resident  #[id number] 68F with advanced dementia (nonverbal, dysphagia w/ PEG tube), CHF, asthma on chronic steroids, left leg fracture, sacral stage 4 ulcer being treated for osteomyelitis, MICU consulted for sepsis with hypotension, tachycardia and tachypnea.    #Sepsis  Pt febrile today (Tmax 99.8 documented on sunrise but reported to be 101F by primary team today), along with HR increased to 130 (now improved to 106), tachypnea/hypoxia (requiring 3L O2), and jump in WBC count from 17k to 32k today.   Suspected source UTI (positive UA and chronic indwelling Blackman), vs. PNA vs other source.     Last BCx NGTD on 4/8.  Recommend:  - repeat blood and urine cultures  - repeat CXR to assess for fluid overload and also to check for consolidations- eval for aspiration PNA  - give 100 mg IV solucortef (stress dose steroids) and 10 mg midodrine via PEG tube now  - order midodrine 10 mg q8h   - 500 cc IV NS bolus.   - hold metoprolol tartrate due to hypotension.         Vicki Jaffe MD PGY2  MICU Resident  #[id number] 68F with advanced dementia (nonverbal, dysphagia w/ PEG tube), CHF, asthma on chronic steroids, left leg fracture, sacral stage 4 ulcer being treated for osteomyelitis, MICU consulted for sepsis with hypotension, tachycardia and tachypnea.    #Sepsis  Pt febrile today (Tmax 99.8 documented on sunrise but reported to be 101F by primary team today), along with HR increased to 130 (now improved to 106), tachypnea/hypoxia (requiring 3L O2), and jump in WBC count from 17k to 32k today.   Suspected source UTI (positive UA and chronic indwelling Blackman), vs. PNA vs other source.     Last BCx NGTD on 4/8.  Recommend:  - repeat blood and urine cultures  - repeat CXR to assess for fluid overload and also to check for consolidations- eval for aspiration PNA  - give 100 mg IV solucortef q8h (stress dose steroids) and 10 mg midodrine via PEG tube now  - order midodrine 10 mg q8h   - 500 cc IV NS bolus.   - hold metoprolol tartrate due to hypotension.         Vicki Jaffe MD PGY2  MICU Resident  #[id number] 68F with advanced dementia (nonverbal, dysphagia w/ PEG tube), CHF, asthma on chronic steroids, left leg fracture, sacral stage 4 ulcer being treated for osteomyelitis, MICU consulted for sepsis with hypotension, tachycardia and tachypnea.    #Sepsis  Pt febrile today (Tmax 99.8 documented on sunrise but reported to be 101F by primary team today), along with HR increased to 130 (now improved to 106), tachypnea/hypoxia (requiring 3L O2), and jump in WBC count from 17k to 32k today.   Suspected source UTI (positive UA and chronic indwelling Blackman), vs. PNA vs other source.     Last BCx NGTD on 4/8.  Recommend:  - repeat blood and urine cultures  - repeat CXR to assess for fluid overload and also to check for consolidations- eval for aspiration PNA  - give 100 mg IV solucortef q8h (stress dose steroids) and 10 mg midodrine via PEG tube now  - order midodrine 10 mg q8h   - 500 cc IV NS bolus.   - hold metoprolol tartrate due to hypotension.     *bedside ultrasound showed focal B lines in left lung, small IVC suggestive of hypovolemia- OK to give more fluids for the patient, coarse breath sounds likely all from upper airway congestion, suspected aspiration PNA in this patient.   - aggressive Chest PT to mobilize secretions.         Vicki Jaffe MD PGY2  MICU Resident  #[id number] 68F with advanced dementia (nonverbal, dysphagia w/ PEG tube), CHF, asthma on chronic steroids, left leg fracture, sacral stage 4 ulcer being treated for osteomyelitis, MICU consulted for sepsis with hypotension, tachycardia and tachypnea.    #Sepsis  Pt febrile today (Tmax 99.8 documented on sunrise but reported to be 101F by primary team today), along with HR increased to 130 (now improved to 106), tachypnea/hypoxia (requiring 3L O2), and jump in WBC count from 17k to 32k today.   Suspected source UTI (positive UA and chronic indwelling Blackman), vs. PNA vs other source.     Last BCx NGTD on 4/8.  Recommend:  - repeat blood and urine cultures  - repeat CXR to assess for fluid overload and also to check for consolidations- eval for aspiration PNA  - give 100 mg IV solucortef q8h (stress dose steroids) and 10 mg midodrine via PEG tube now  - order midodrine 10 mg q8h   - 500 cc IV NS bolus.   - hold metoprolol tartrate due to hypotension.     *bedside ultrasound showed focal B lines in left lung, small IVC suggestive of hypovolemia- OK to give more fluids for the patient, coarse breath sounds likely all from upper airway congestion, suspected aspiration PNA in this patient.   - aggressive Chest PT to mobilize secretions, and suctioning.         Vicki Jaffe MD PGY2  MICU Resident  #[id number]

## 2019-04-18 NOTE — CONSULT NOTE ADULT - ATTENDING COMMENTS
Patient is seen and examined.  67 yo woman with severe dementia (bedbound and nonverbal at baseline) with chronic decub and on chronic steroids for asthma, s/p recent hosptialization with septic shock in the setting of aspiration PNA/UTI now admitted with osteomyelitis 2/2 sacral decubitus vs ongoing hip infection. Patient newly febrile, hypotensive, tachycardic with worsening leukocytosis s/p IR procedure. On my evaluation, patient is noted to have large amount of oral secretions collecting in airway with loud upper airway sounds. POCUS with focal B lines on the left but A line predominant on right and virtual IVC.   -- would broaden Abx to thee + vanco  -- stress dose steroids given chronic steroid use  -- start midodrine 10 mg q 8  -- volume depleted on exam - would give additional IVF while monitoring respiratory status closely  -- patient is clearly aspirating and a very high risk for recurrent aspirations which was discussed with . Recommend aggressive chest PT with frequent suctioning and careful oral care. Not a MICU candidate at this time. Please reconsult if BPs don't improve or respiratory status worsens.
Patient seen and examined. Agree with note as above with addendum: patient known to writer from previous prolonged admission. She is non-verbal but has a supportive family ( and 2 daughters) who are always here. One of her duaghters was painting her nails and was agreeable to the plan as above. Will sign off at this time, re-consult as needed.  Marilyn Schultz MD  Pager: [phone]  Nights and Weekends: [phone]

## 2019-04-18 NOTE — PROGRESS NOTE ADULT - SUBJECTIVE AND OBJECTIVE BOX
Interventional Radiology     Pre- Procedure   Patient and/or family/surrogate educated about central line-associated blood stream infection prevention practices  Central Line insertion checklist utilized    Catheter Type: (  ) Dialysis  (  ) Introducer  (   ) Central venous catheter   ( x ) peripherally inserted central catheter (PICC)      Number of Lumens: (x  ) single   (  ) Double   (   ) Triple  (  ) Antimicrobial catheter    TIME OUT performed prior to this procedure with verbal confirmation of correct patient identity, correct site, side and richard (if applicable), agreement of the procedure, correct patient position, availability of necessary equipment.  The consent form (if applicable) is complete and accurate.  Safety precautions based on patient history or medication use has been addressed   RN at bedside    Procedure  The patient was placed in the ( x )Supine position, (   ) Trendelenburg postiton.  The patient's placement site was prepped with Chlorhexidine solution then drapped using maximum sterile barrier protection.   Using the  (  x) Seldinger technique    (  ) Modified Seldinger technique   (  ) Ultrasound, the catheter was introduced.  Strict adherence to outlined aseptic technique, including hand washing prior to donning sterile barriers (gloves and gown), utilizing a mask and cap, plus draping the patient with a sterile drape was observed.  Uponcompletion of the line placement, the insertion site was covered with sterile dressing.    All materials used for catheter insertion, including the intact guide wire, were accounted for at the end of the procedure      Emergency placement ( x ) No    (   ) Yes   (   ) New Site    ( x  )  Guide Wire change      Attempted site and actual site (   ) Right  ( x  )  Left                Brachial vein         Post Procedure (Catheter Placement Verification)  Correct placement confirmed by pressure transducer or ultrasonography or venous saturation  The tip of the catheter is confirmed to be in the SVC by radiography which revealed no pneumothorax and line may be accessed

## 2019-04-19 LAB
ANION GAP SERPL CALC-SCNC: 11 MMOL/L — SIGNIFICANT CHANGE UP (ref 5–17)
BLD GP AB SCN SERPL QL: NEGATIVE — SIGNIFICANT CHANGE UP
BUN SERPL-MCNC: 48 MG/DL — HIGH (ref 7–23)
CALCIUM SERPL-MCNC: 8.4 MG/DL — SIGNIFICANT CHANGE UP (ref 8.4–10.5)
CHLORIDE SERPL-SCNC: 92 MMOL/L — LOW (ref 96–108)
CO2 SERPL-SCNC: 24 MMOL/L — SIGNIFICANT CHANGE UP (ref 22–31)
CREAT SERPL-MCNC: 0.32 MG/DL — LOW (ref 0.5–1.3)
CULTURE RESULTS: SIGNIFICANT CHANGE UP
GLUCOSE BLDC GLUCOMTR-MCNC: 167 MG/DL — HIGH (ref 70–99)
GLUCOSE BLDC GLUCOMTR-MCNC: 261 MG/DL — HIGH (ref 70–99)
GLUCOSE BLDC GLUCOMTR-MCNC: 293 MG/DL — HIGH (ref 70–99)
GLUCOSE BLDC GLUCOMTR-MCNC: 294 MG/DL — HIGH (ref 70–99)
GLUCOSE BLDC GLUCOMTR-MCNC: 301 MG/DL — HIGH (ref 70–99)
GLUCOSE SERPL-MCNC: 271 MG/DL — HIGH (ref 70–99)
HCT VFR BLD CALC: 22.2 % — LOW (ref 34.5–45)
HCT VFR BLD CALC: 23.4 % — LOW (ref 34.5–45)
HGB BLD-MCNC: 7.5 G/DL — LOW (ref 11.5–15.5)
HGB BLD-MCNC: 7.9 G/DL — LOW (ref 11.5–15.5)
LACTATE SERPL-SCNC: 1.1 MMOL/L — SIGNIFICANT CHANGE UP (ref 0.7–2)
MCHC RBC-ENTMCNC: 32.1 PG — SIGNIFICANT CHANGE UP (ref 27–34)
MCHC RBC-ENTMCNC: 32.3 PG — SIGNIFICANT CHANGE UP (ref 27–34)
MCHC RBC-ENTMCNC: 33.7 GM/DL — SIGNIFICANT CHANGE UP (ref 32–36)
MCHC RBC-ENTMCNC: 33.8 GM/DL — SIGNIFICANT CHANGE UP (ref 32–36)
MCV RBC AUTO: 95.2 FL — SIGNIFICANT CHANGE UP (ref 80–100)
MCV RBC AUTO: 95.3 FL — SIGNIFICANT CHANGE UP (ref 80–100)
PLATELET # BLD AUTO: 243 K/UL — SIGNIFICANT CHANGE UP (ref 150–400)
PLATELET # BLD AUTO: 255 K/UL — SIGNIFICANT CHANGE UP (ref 150–400)
POTASSIUM SERPL-MCNC: 4.1 MMOL/L — SIGNIFICANT CHANGE UP (ref 3.5–5.3)
POTASSIUM SERPL-SCNC: 4.1 MMOL/L — SIGNIFICANT CHANGE UP (ref 3.5–5.3)
RBC # BLD: 2.32 M/UL — LOW (ref 3.8–5.2)
RBC # BLD: 2.45 M/UL — LOW (ref 3.8–5.2)
RBC # FLD: 17.5 % — HIGH (ref 10.3–14.5)
RBC # FLD: 17.5 % — HIGH (ref 10.3–14.5)
RH IG SCN BLD-IMP: NEGATIVE — SIGNIFICANT CHANGE UP
SODIUM SERPL-SCNC: 127 MMOL/L — LOW (ref 135–145)
SPECIMEN SOURCE: SIGNIFICANT CHANGE UP
WBC # BLD: 22.4 K/UL — HIGH (ref 3.8–10.5)
WBC # BLD: 23.6 K/UL — HIGH (ref 3.8–10.5)
WBC # FLD AUTO: 22.4 K/UL — HIGH (ref 3.8–10.5)
WBC # FLD AUTO: 23.6 K/UL — HIGH (ref 3.8–10.5)

## 2019-04-19 PROCEDURE — 71045 X-RAY EXAM CHEST 1 VIEW: CPT | Mod: 26,77

## 2019-04-19 PROCEDURE — 71045 X-RAY EXAM CHEST 1 VIEW: CPT | Mod: 26

## 2019-04-19 PROCEDURE — 99232 SBSQ HOSP IP/OBS MODERATE 35: CPT

## 2019-04-19 RX ORDER — DIPHENHYDRAMINE HCL 50 MG
25 CAPSULE ORAL ONCE
Qty: 0 | Refills: 0 | Status: COMPLETED | OUTPATIENT
Start: 2019-04-19 | End: 2019-04-19

## 2019-04-19 RX ORDER — SODIUM CHLORIDE 9 MG/ML
250 INJECTION INTRAMUSCULAR; INTRAVENOUS; SUBCUTANEOUS
Qty: 0 | Refills: 0 | Status: COMPLETED | OUTPATIENT
Start: 2019-04-19 | End: 2019-04-19

## 2019-04-19 RX ORDER — ACETAMINOPHEN 500 MG
325 TABLET ORAL ONCE
Qty: 0 | Refills: 0 | Status: COMPLETED | OUTPATIENT
Start: 2019-04-19 | End: 2019-04-19

## 2019-04-19 RX ORDER — ACETAMINOPHEN 500 MG
650 TABLET ORAL ONCE
Qty: 0 | Refills: 0 | Status: COMPLETED | OUTPATIENT
Start: 2019-04-19 | End: 2019-04-19

## 2019-04-19 RX ORDER — SODIUM CHLORIDE 9 MG/ML
4 INJECTION INTRAMUSCULAR; INTRAVENOUS; SUBCUTANEOUS EVERY 6 HOURS
Qty: 0 | Refills: 0 | Status: DISCONTINUED | OUTPATIENT
Start: 2019-04-19 | End: 2019-05-02

## 2019-04-19 RX ORDER — IPRATROPIUM/ALBUTEROL SULFATE 18-103MCG
3 AEROSOL WITH ADAPTER (GRAM) INHALATION EVERY 6 HOURS
Qty: 0 | Refills: 0 | Status: DISCONTINUED | OUTPATIENT
Start: 2019-04-19 | End: 2019-04-19

## 2019-04-19 RX ORDER — SODIUM CHLORIDE 9 MG/ML
250 INJECTION INTRAMUSCULAR; INTRAVENOUS; SUBCUTANEOUS ONCE
Qty: 0 | Refills: 0 | Status: COMPLETED | OUTPATIENT
Start: 2019-04-19 | End: 2019-04-19

## 2019-04-19 RX ORDER — SODIUM CHLORIDE 9 MG/ML
250 INJECTION INTRAMUSCULAR; INTRAVENOUS; SUBCUTANEOUS ONCE
Qty: 0 | Refills: 0 | Status: COMPLETED | OUTPATIENT
Start: 2019-04-19 | End: 2019-04-20

## 2019-04-19 RX ORDER — IBUPROFEN 200 MG
400 TABLET ORAL ONCE
Qty: 0 | Refills: 0 | Status: DISCONTINUED | OUTPATIENT
Start: 2019-04-19 | End: 2019-04-19

## 2019-04-19 RX ORDER — BUDESONIDE, MICRONIZED 100 %
0.5 POWDER (GRAM) MISCELLANEOUS EVERY 12 HOURS
Qty: 0 | Refills: 0 | Status: DISCONTINUED | OUTPATIENT
Start: 2019-04-19 | End: 2019-05-06

## 2019-04-19 RX ADMIN — Medication 0.5 MILLIGRAM(S): at 18:00

## 2019-04-19 RX ADMIN — Medication 7.5 MILLIGRAM(S): at 05:59

## 2019-04-19 RX ADMIN — SODIUM CHLORIDE 250 MILLILITER(S): 9 INJECTION INTRAMUSCULAR; INTRAVENOUS; SUBCUTANEOUS at 02:19

## 2019-04-19 RX ADMIN — TIZANIDINE 4 MILLIGRAM(S): 4 TABLET ORAL at 21:28

## 2019-04-19 RX ADMIN — Medication 1 APPLICATION(S): at 12:58

## 2019-04-19 RX ADMIN — Medication 1: at 22:56

## 2019-04-19 RX ADMIN — Medication 2 MILLIGRAM(S): at 10:11

## 2019-04-19 RX ADMIN — SODIUM CHLORIDE 75 MILLILITER(S): 9 INJECTION INTRAMUSCULAR; INTRAVENOUS; SUBCUTANEOUS at 06:34

## 2019-04-19 RX ADMIN — Medication 3 MILLILITER(S): at 06:02

## 2019-04-19 RX ADMIN — Medication 3: at 12:52

## 2019-04-19 RX ADMIN — SODIUM CHLORIDE 4 MILLILITER(S): 9 INJECTION INTRAMUSCULAR; INTRAVENOUS; SUBCUTANEOUS at 18:00

## 2019-04-19 RX ADMIN — Medication 3 MILLILITER(S): at 12:04

## 2019-04-19 RX ADMIN — Medication 3 MILLILITER(S): at 18:00

## 2019-04-19 RX ADMIN — Medication 650 MILLIGRAM(S): at 15:52

## 2019-04-19 RX ADMIN — Medication 4: at 09:46

## 2019-04-19 RX ADMIN — Medication 325 MILLIGRAM(S): at 18:41

## 2019-04-19 RX ADMIN — SODIUM CHLORIDE 4 MILLILITER(S): 9 INJECTION INTRAMUSCULAR; INTRAVENOUS; SUBCUTANEOUS at 12:05

## 2019-04-19 RX ADMIN — APIXABAN 5 MILLIGRAM(S): 2.5 TABLET, FILM COATED ORAL at 06:00

## 2019-04-19 RX ADMIN — Medication 1 DROP(S): at 05:44

## 2019-04-19 RX ADMIN — Medication 1.5 MILLIGRAM(S): at 21:28

## 2019-04-19 RX ADMIN — Medication 300 MILLIGRAM(S): at 09:47

## 2019-04-19 RX ADMIN — MEROPENEM 100 MILLIGRAM(S): 1 INJECTION INTRAVENOUS at 19:00

## 2019-04-19 RX ADMIN — Medication 250 MILLIGRAM(S): at 11:20

## 2019-04-19 RX ADMIN — Medication 25 MILLIGRAM(S): at 10:09

## 2019-04-19 RX ADMIN — Medication 100 MILLIGRAM(S): at 00:15

## 2019-04-19 RX ADMIN — MIDODRINE HYDROCHLORIDE 10 MILLIGRAM(S): 2.5 TABLET ORAL at 00:18

## 2019-04-19 RX ADMIN — Medication 25 MILLIGRAM(S): at 18:39

## 2019-04-19 RX ADMIN — GABAPENTIN 300 MILLIGRAM(S): 400 CAPSULE ORAL at 12:57

## 2019-04-19 RX ADMIN — Medication 1 DROP(S): at 12:54

## 2019-04-19 RX ADMIN — TIZANIDINE 4 MILLIGRAM(S): 4 TABLET ORAL at 09:47

## 2019-04-19 RX ADMIN — Medication 3 MILLILITER(S): at 23:03

## 2019-04-19 RX ADMIN — QUETIAPINE FUMARATE 25 MILLIGRAM(S): 200 TABLET, FILM COATED ORAL at 21:28

## 2019-04-19 RX ADMIN — Medication 1: at 17:57

## 2019-04-19 RX ADMIN — Medication 3 MILLILITER(S): at 03:33

## 2019-04-19 RX ADMIN — Medication 1 APPLICATION(S): at 05:44

## 2019-04-19 RX ADMIN — Medication 1 DROP(S): at 21:28

## 2019-04-19 RX ADMIN — APIXABAN 5 MILLIGRAM(S): 2.5 TABLET, FILM COATED ORAL at 18:00

## 2019-04-19 RX ADMIN — Medication 1 TABLET(S): at 09:48

## 2019-04-19 RX ADMIN — MEROPENEM 100 MILLIGRAM(S): 1 INJECTION INTRAVENOUS at 09:00

## 2019-04-19 RX ADMIN — Medication 1 APPLICATION(S): at 21:29

## 2019-04-19 RX ADMIN — FAMOTIDINE 20 MILLIGRAM(S): 10 INJECTION INTRAVENOUS at 09:48

## 2019-04-19 RX ADMIN — Medication 650 MILLIGRAM(S): at 15:51

## 2019-04-19 RX ADMIN — Medication 500 MILLIGRAM(S): at 09:48

## 2019-04-19 RX ADMIN — SODIUM CHLORIDE 4 MILLILITER(S): 9 INJECTION INTRAMUSCULAR; INTRAVENOUS; SUBCUTANEOUS at 05:59

## 2019-04-19 RX ADMIN — SODIUM CHLORIDE 250 MILLILITER(S): 9 INJECTION INTRAMUSCULAR; INTRAVENOUS; SUBCUTANEOUS at 00:15

## 2019-04-19 RX ADMIN — SODIUM CHLORIDE 4 MILLILITER(S): 9 INJECTION INTRAMUSCULAR; INTRAVENOUS; SUBCUTANEOUS at 23:03

## 2019-04-19 RX ADMIN — GABAPENTIN 300 MILLIGRAM(S): 400 CAPSULE ORAL at 21:27

## 2019-04-19 RX ADMIN — Medication 20 MILLIGRAM(S): at 19:48

## 2019-04-19 NOTE — CHART NOTE - NSCHARTNOTEFT_GEN_A_CORE
Called by RN for patient with temperature of 101.3 during blood transfusion despite pre-medication with Tylenol via PEG.  Patient was seen and examined and is clinically unchanged from earlier exam.  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  Case d/w with ID attending on call - advised to continue current antibiotics, await pending blood cultures (sent 4/19), and repeat CBC with differential in am.  Spoke with Dr. Bolden - doubt transfusion reaction - however will treat with additional 325mG Tylenol (patient has ASA allergy and family unable to confirm tolerance to any NSAIDs), 25mG IV benadryl, and 20mG IV solumedrol.  Will also doppler LUE to eval for DVT as patient has PICC line.  PICC line was changed over wire on 4/18 due to malposition.  Will continue to monitor.    Sherin Escobedo NP  [phone]

## 2019-04-19 NOTE — PROGRESS NOTE ADULT - SUBJECTIVE AND OBJECTIVE BOX
infectious diseases progress note:    Patient is a 68y old  Female who presents with a chief complaint of fevers (2019 23:47)        Fever             Allergies    ASA; dye contrast (Anaphylaxis)  aspirin (Short breath)  divalproex sodium (Other (Unknown))  Flowers and Plants (Short breath)  Haldol (Other (Unknown))  penicillin (Short breath; Rash)  sulfa drugs (Short breath; Rash)  vancomycin (Rash; Urticaria; Hives)  Xanax (Other (Unknown))    Intolerances        ANTIBIOTICS/RELEVANT:  antimicrobials  meropenem  IVPB 1000 milliGRAM(s) IV Intermittent every 12 hours  vancomycin  IVPB 1000 milliGRAM(s) IV Intermittent every 24 hours    immunologic:    OTHER:  acetaminophen    Suspension .. 650 milliGRAM(s) Oral every 6 hours PRN  ALBUTerol    90 MICROgram(s) HFA Inhaler 1 Puff(s) Inhalation every 4 hours  ALBUTerol/ipratropium for Nebulization 3 milliLiter(s) Nebulizer every 6 hours  ALBUTerol/ipratropium for Nebulization. 3 milliLiter(s) Nebulizer every 6 hours  apixaban 5 milliGRAM(s) Oral every 12 hours  artificial tears (preservative free) Ophthalmic Solution 1 Drop(s) Both EYES three times a day  ascorbic acid 500 milliGRAM(s) Oral daily  buDESOnide 160 MICROgram(s)/formoterol 4.5 MICROgram(s) Inhaler 2 Puff(s) Inhalation two times a day  clonazePAM Tablet 1.5 milliGRAM(s) Oral <User Schedule>  dextrose 40% Gel 15 Gram(s) Oral once PRN  dextrose 5%. 1000 milliLiter(s) IV Continuous <Continuous>  dextrose 50% Injectable 12.5 Gram(s) IV Push once  dextrose 50% Injectable 25 Gram(s) IV Push once  dextrose 50% Injectable 25 Gram(s) IV Push once  diphenhydrAMINE   Elixir 25 milliGRAM(s) Oral every 6 hours PRN  ergocalciferol 29839 Unit(s) Oral every week  famotidine    Tablet 20 milliGRAM(s) Oral daily  ferrous    sulfate Liquid 300 milliGRAM(s) Enteral Tube daily  gabapentin   Solution 300 milliGRAM(s) Oral two times a day  glucagon  Injectable 1 milliGRAM(s) IntraMuscular once PRN  insulin lispro (HumaLOG) corrective regimen sliding scale   SubCutaneous three times a day before meals  insulin lispro (HumaLOG) corrective regimen sliding scale   SubCutaneous at bedtime  loperamide Solution 2 milliGRAM(s) Enteral Tube daily  Medical Marijuana Oil 1 milliLiter(s) 1 milliLiter(s) Oral <User Schedule>  multivitamin/minerals 1 Tablet(s) Oral daily  nystatin Powder 1 Application(s) Topical two times a day PRN  oxyCODONE    IR 5 milliGRAM(s) Oral every 6 hours PRN  petrolatum white Ointment 1 Application(s) Topical three times a day  predniSONE   Tablet   Oral   predniSONE   Tablet 7.5 milliGRAM(s) Oral daily  QUEtiapine 25 milliGRAM(s) Oral at bedtime  sodium chloride 0.65% Nasal 1 Spray(s) Both Nostrils three times a day PRN  sodium chloride 0.9% Bolus 500 milliLiter(s) IV Bolus once  sodium chloride 3%  Inhalation 4 milliLiter(s) Inhalation every 6 hours  tiotropium 18 MICROgram(s) Capsule 1 Capsule(s) Inhalation daily  tiZANidine 4 milliGRAM(s) Oral <User Schedule>      Objective:  Vital Signs Last 24 Hrs  T(C): 36.7 (2019 04:45), Max: 37.7 (2019 20:06)  T(F): 98 (2019 04:45), Max: 99.8 (2019 20:06)  HR: 106 (2019 06:11) (104 - 132)  BP: 107/63 (2019 06:11) (82/40 - 110/59)  BP(mean): --  RR: 20 (2019 06:11) (20 - 20)  SpO2: 100% (2019 06:11) (100% - 100%)     Eyes:JACQUELIN, EOMI  Ear/Nose/Throat: no oral lesion, no sinus tenderness on percussion	  Neck:no JVD, no lymphadenopathy, supple  Respiratory: CTA maryjane     Gastrointestinal:soft, (+) BS, no HSM  Extremities:no e/e/c        LABS:                        9.2    32.4  )-----------( 369      ( 2019 22:06 )             27.2     04-19    127<L>  |  92<L>  |  48<H>  ----------------------------<  271<H>  4.1   |  24  |  0.32<L>    Ca    8.4      2019 06:38    TPro  5.1<L>  /  Alb  2.6<L>  /  TBili  0.4  /  DBili  x   /  AST  23  /  ALT  21  /  AlkPhos  125<H>  0418    PT/INR - ( 2019 11:57 )   PT: 15.4 sec;   INR: 1.33 ratio         PTT - ( 2019 11:57 )  PTT:33.4 sec  Urinalysis Basic - ( 2019 22:26 )    Color: Yellow / Appearance: Slightly Turbid / S.019 / pH: x  Gluc: x / Ketone: Negative  / Bili: Negative / Urobili: Negative   Blood: x / Protein: Trace / Nitrite: Negative   Leuk Esterase: Large / RBC: 5 /hpf /  /HPF   Sq Epi: x / Non Sq Epi: 0 / Bacteria: Negative          MICROBIOLOGY:    RECENT CULTURES:        RESPIRATORY CULTURES:              RADIOLOGY & ADDITIONAL STUDIES:        Pager [phone]  After 5 pm/weekends or if no response :[phone]

## 2019-04-19 NOTE — PROGRESS NOTE ADULT - SUBJECTIVE AND OBJECTIVE BOX
---___---___---___---___---___---___ ---___---___---___---___---___---___---___---___---___---                    <<<  M E D I C A L   A T T E N D I N G    F O L L O W    U P   N O T E  >>>  patient seen today  and tachypneic, has been overall in ill health.    ---___---___---___---___---___---      <<<  MEDICATIONS:  >>>    MEDICATIONS  (STANDING):  ALBUTerol    90 MICROgram(s) HFA Inhaler 1 Puff(s) Inhalation every 4 hours  ALBUTerol/ipratropium for Nebulization 3 milliLiter(s) Nebulizer every 6 hours  ALBUTerol/ipratropium for Nebulization. 3 milliLiter(s) Nebulizer every 6 hours  apixaban 5 milliGRAM(s) Oral every 12 hours  artificial tears (preservative free) Ophthalmic Solution 1 Drop(s) Both EYES three times a day  ascorbic acid 500 milliGRAM(s) Oral daily  buDESOnide 160 MICROgram(s)/formoterol 4.5 MICROgram(s) Inhaler 2 Puff(s) Inhalation two times a day  clonazePAM Tablet 1.5 milliGRAM(s) Oral <User Schedule>  dextrose 5%. 1000 milliLiter(s) (50 mL/Hr) IV Continuous <Continuous>  dextrose 50% Injectable 12.5 Gram(s) IV Push once  dextrose 50% Injectable 25 Gram(s) IV Push once  dextrose 50% Injectable 25 Gram(s) IV Push once  ergocalciferol 26339 Unit(s) Oral every week  famotidine    Tablet 20 milliGRAM(s) Oral daily  ferrous    sulfate Liquid 300 milliGRAM(s) Enteral Tube daily  gabapentin   Solution 300 milliGRAM(s) Oral two times a day  insulin lispro (HumaLOG) corrective regimen sliding scale   SubCutaneous three times a day before meals  insulin lispro (HumaLOG) corrective regimen sliding scale   SubCutaneous at bedtime  loperamide Solution 2 milliGRAM(s) Enteral Tube daily  Medical Marijuana Oil 1 milliLiter(s) 1 milliLiter(s) Oral <User Schedule>  meropenem  IVPB 1000 milliGRAM(s) IV Intermittent every 12 hours  multivitamin/minerals 1 Tablet(s) Oral daily  petrolatum white Ointment 1 Application(s) Topical three times a day  predniSONE   Tablet   Oral   predniSONE   Tablet 7.5 milliGRAM(s) Oral daily  QUEtiapine 25 milliGRAM(s) Oral at bedtime  sodium chloride 0.9% Bolus 500 milliLiter(s) IV Bolus once  sodium chloride 3%  Inhalation 4 milliLiter(s) Inhalation every 6 hours  tiotropium 18 MICROgram(s) Capsule 1 Capsule(s) Inhalation daily  tiZANidine 4 milliGRAM(s) Oral <User Schedule>  vancomycin  IVPB 1000 milliGRAM(s) IV Intermittent every 24 hours      MEDICATIONS  (PRN):  acetaminophen    Suspension .. 650 milliGRAM(s) Oral every 6 hours PRN Temp greater or equal to 38C (100.4F), Mild Pain (1 - 3), Moderate Pain (4 - 6)  dextrose 40% Gel 15 Gram(s) Oral once PRN Blood Glucose LESS THAN 70 milliGRAM(s)/deciLiter  diphenhydrAMINE   Elixir 25 milliGRAM(s) Oral every 6 hours PRN Rash and/or Itching  glucagon  Injectable 1 milliGRAM(s) IntraMuscular once PRN Glucose <70 milliGRAM(s)/deciLiter  nystatin Powder 1 Application(s) Topical two times a day PRN rash/itchiness  oxyCODONE    IR 5 milliGRAM(s) Oral every 6 hours PRN Moderate Pain (4 - 6)/Severe Pain (7 - 10)  sodium chloride 0.65% Nasal 1 Spray(s) Both Nostrils three times a day PRN Nasal Congestion       ---___---___---___---___---___---     <<<REVIEW OF SYSTEM: >>>  unable to obtain    ---___---___---___---___---___---          <<<  VITAL SIGNS: >>>    T(F): 98 (19 @ 04:45), Max: 99.8 (19 @ 20:06)  HR: 106 (19 @ 06:11) (104 - 132)  BP: 107/63 (19 @ 06:11) (82/40 - 110/59)  RR: 20 (19 @ 06:11) (20 - 20)  SpO2: 100% (19 @ 06:11) (100% - 100%)  Wt(kg): --  CAPILLARY BLOOD GLUCOSE      POCT Blood Glucose.: 229 mg/dL (2019 21:41)    I&O's Summary    2019 07:01  -  2019 07:00  --------------------------------------------------------  IN: 1700 mL / OUT: 600 mL / NET: 1100 mL         ---___---___---___---___---___---                       PHYSICAL EXAM:    GEN: A&O X 0 , NAD , comfortable  HEENT: NCAT, PERRL, MMM, no scleral icterus, hearing intact  NECK: Supple, No JVD  CVS: S1S2 , regular , No M/R/G appreciated  PULM: CTA B/L,  no W/R/R appreciated  ABD.: soft. non tender, non distended,  bowel sounds present peg noted   Extrem: intact pulses , no edema noted fracture left leg in brace  Derm: No rash or ecchymosis noted sacral decubitus and now with left lateral malleolus pressure wound   PSYCH:  anxious     ---___---___---___---___---___---     <<<  LAB AND IMAGING: >>>                          9.2    32.4  )-----------( 369      ( 2019 22:06 )             27.2                   127<L>  |  92<L>  |  48<H>  ----------------------------<  271<H>  4.1   |  24  |  0.32<L>    Ca    8.4      2019 06:38    TPro  5.1<L>  /  Alb  2.6<L>  /  TBili  0.4  /  DBili  x   /  AST  23  /  ALT  21  /  AlkPhos  125<H>  04-18    PT/INR - ( 2019 11:57 )   PT: 15.4 sec;   INR: 1.33 ratio         PTT - ( 2019 11:57 )  PTT:33.4 sec       Urinalysis Basic - ( 2019 22:26 )    Color: Yellow / Appearance: Slightly Turbid / S.019 / pH: x  Gluc: x / Ketone: Negative  / Bili: Negative / Urobili: Negative   Blood: x / Protein: Trace / Nitrite: Negative   Leuk Esterase: Large / RBC: 5 /hpf /  /HPF   Sq Epi: x / Non Sq Epi: 0 / Bacteria: Negative                ABG - ( 2019 22:20 )  pH, Arterial: 7.54  pH, Blood: x     /  pCO2: 30    /  pO2: 64    / HCO3: 25    / Base Excess: 2.9   /  SaO2: 94                      [All pertinent / recent available Imaging reports and other labs reviewed]     ---___---___---___---___---___---___ ---___---___---___---___---           <<<  A S S E S S M E N T   A N D   P L A N :  >>>    hypotension ion iv fluid hold bp meds   hyponatremia  to correct with normal saline   fractue left leg  pain managm,ent and supportive care   chronic mrsa infection  on vanco id following   chronic pain  continue medical marijuana an oxycodone  dementia  continue supportive care   -GI/DVT Prophylaxis. on xarelto    --------------------------------------------  Case discussed with   Education given on     >>______________________<<      Deniz Bolden .         phone   [phone]

## 2019-04-19 NOTE — PROVIDER CONTACT NOTE (OTHER) - ASSESSMENT
Patient is nonverbal. Patient is currently getting blood transfusion. Patient was given Acetaminophen 650mg 30mins prior to the blood transfusion. No prior blood transfusion reactions in the past

## 2019-04-19 NOTE — PROVIDER CONTACT NOTE (OTHER) - ASSESSMENT
Pt nonverbal. Pt's Hr 130 /63 O2sat 100% on 3L NC, RR 20, Temp 99.8 axillary. Tylenol PRN given for pain.

## 2019-04-19 NOTE — PROGRESS NOTE ADULT - ASSESSMENT
68 yr old with advanced dementia , bed bound, contracted, cva, sp removal of infected hip due to mrsa with placement of space last fall.   Pt has been havening fevers for months.  I suspect there is ongoing Om of the hip site but changing the spacer is not a realistic option . chronic Om of the sacrum usually does not cause fevers and does not need prolonged ab therapy .       cultures negative to date  uc with yeast of doubtful significance    vanco to cover mrsa  in hip whcih is likely cause of fever  no good options  discussed with   pathologic fracture noted due to contractures and osteoporosis      plan several weeks of vanco realizing she may still have fever.   Discussed with  in detail    temps have mostly been down but became hypotensive yesterday and WBC up to 32 K   and meropenem was restarted  At risk for decubitis related sepsis, UTI and aspiration on top of chronic  MRSA infection with a large pathologic fracture of leg.  prognosis is  dismal, but  is not realistic   await cultures and response to meropenem

## 2019-04-20 LAB
ANION GAP SERPL CALC-SCNC: 10 MMOL/L — SIGNIFICANT CHANGE UP (ref 5–17)
BASOPHILS # BLD AUTO: 0 K/UL — SIGNIFICANT CHANGE UP (ref 0–0.2)
BASOPHILS NFR BLD AUTO: 0.1 % — SIGNIFICANT CHANGE UP (ref 0–2)
BUN SERPL-MCNC: 38 MG/DL — HIGH (ref 7–23)
CALCIUM SERPL-MCNC: 8.2 MG/DL — LOW (ref 8.4–10.5)
CHLORIDE SERPL-SCNC: 97 MMOL/L — SIGNIFICANT CHANGE UP (ref 96–108)
CO2 SERPL-SCNC: 25 MMOL/L — SIGNIFICANT CHANGE UP (ref 22–31)
CREAT SERPL-MCNC: <0.3 MG/DL — LOW (ref 0.5–1.3)
EOSINOPHIL # BLD AUTO: 0 K/UL — SIGNIFICANT CHANGE UP (ref 0–0.5)
EOSINOPHIL NFR BLD AUTO: 0.1 % — SIGNIFICANT CHANGE UP (ref 0–6)
GLUCOSE BLDC GLUCOMTR-MCNC: 161 MG/DL — HIGH (ref 70–99)
GLUCOSE BLDC GLUCOMTR-MCNC: 194 MG/DL — HIGH (ref 70–99)
GLUCOSE BLDC GLUCOMTR-MCNC: 225 MG/DL — HIGH (ref 70–99)
GLUCOSE BLDC GLUCOMTR-MCNC: 262 MG/DL — HIGH (ref 70–99)
GLUCOSE SERPL-MCNC: 278 MG/DL — HIGH (ref 70–99)
HCT VFR BLD CALC: 24.5 % — LOW (ref 34.5–45)
HGB BLD-MCNC: 8.5 G/DL — LOW (ref 11.5–15.5)
LYMPHOCYTES # BLD AUTO: 0.1 K/UL — LOW (ref 1–3.3)
LYMPHOCYTES # BLD AUTO: 0.6 % — LOW (ref 13–44)
MCHC RBC-ENTMCNC: 32.7 PG — SIGNIFICANT CHANGE UP (ref 27–34)
MCHC RBC-ENTMCNC: 34.9 GM/DL — SIGNIFICANT CHANGE UP (ref 32–36)
MCV RBC AUTO: 93.8 FL — SIGNIFICANT CHANGE UP (ref 80–100)
MONOCYTES # BLD AUTO: 0.3 K/UL — SIGNIFICANT CHANGE UP (ref 0–0.9)
MONOCYTES NFR BLD AUTO: 2.1 % — SIGNIFICANT CHANGE UP (ref 2–14)
NEUTROPHILS # BLD AUTO: 15.4 K/UL — HIGH (ref 1.8–7.4)
NEUTROPHILS NFR BLD AUTO: 97.1 % — HIGH (ref 43–77)
PLATELET # BLD AUTO: 226 K/UL — SIGNIFICANT CHANGE UP (ref 150–400)
POTASSIUM SERPL-MCNC: 4 MMOL/L — SIGNIFICANT CHANGE UP (ref 3.5–5.3)
POTASSIUM SERPL-SCNC: 4 MMOL/L — SIGNIFICANT CHANGE UP (ref 3.5–5.3)
RBC # BLD: 2.61 M/UL — LOW (ref 3.8–5.2)
RBC # FLD: 17.1 % — HIGH (ref 10.3–14.5)
SODIUM SERPL-SCNC: 132 MMOL/L — LOW (ref 135–145)
WBC # BLD: 15.8 K/UL — HIGH (ref 3.8–10.5)
WBC # FLD AUTO: 15.8 K/UL — HIGH (ref 3.8–10.5)

## 2019-04-20 PROCEDURE — 93971 EXTREMITY STUDY: CPT | Mod: 26

## 2019-04-20 RX ORDER — CLONAZEPAM 1 MG
1.5 TABLET ORAL
Qty: 0 | Refills: 0 | Status: DISCONTINUED | OUTPATIENT
Start: 2019-04-20 | End: 2019-04-27

## 2019-04-20 RX ADMIN — Medication 7.5 MILLIGRAM(S): at 05:37

## 2019-04-20 RX ADMIN — Medication 0.5 MILLIGRAM(S): at 17:43

## 2019-04-20 RX ADMIN — Medication 3 MILLILITER(S): at 13:28

## 2019-04-20 RX ADMIN — Medication 1 DROP(S): at 21:26

## 2019-04-20 RX ADMIN — SODIUM CHLORIDE 250 MILLILITER(S): 9 INJECTION INTRAMUSCULAR; INTRAVENOUS; SUBCUTANEOUS at 00:09

## 2019-04-20 RX ADMIN — Medication 1 APPLICATION(S): at 13:39

## 2019-04-20 RX ADMIN — MEROPENEM 100 MILLIGRAM(S): 1 INJECTION INTRAVENOUS at 05:36

## 2019-04-20 RX ADMIN — FAMOTIDINE 20 MILLIGRAM(S): 10 INJECTION INTRAVENOUS at 10:30

## 2019-04-20 RX ADMIN — Medication 1 APPLICATION(S): at 21:30

## 2019-04-20 RX ADMIN — SODIUM CHLORIDE 4 MILLILITER(S): 9 INJECTION INTRAMUSCULAR; INTRAVENOUS; SUBCUTANEOUS at 13:29

## 2019-04-20 RX ADMIN — QUETIAPINE FUMARATE 25 MILLIGRAM(S): 200 TABLET, FILM COATED ORAL at 21:26

## 2019-04-20 RX ADMIN — Medication 25 MILLIGRAM(S): at 10:30

## 2019-04-20 RX ADMIN — Medication 1 APPLICATION(S): at 05:38

## 2019-04-20 RX ADMIN — Medication 1 DROP(S): at 13:29

## 2019-04-20 RX ADMIN — Medication 250 MILLIGRAM(S): at 10:34

## 2019-04-20 RX ADMIN — Medication 1: at 18:04

## 2019-04-20 RX ADMIN — GABAPENTIN 300 MILLIGRAM(S): 400 CAPSULE ORAL at 21:26

## 2019-04-20 RX ADMIN — Medication 0.5 MILLIGRAM(S): at 05:38

## 2019-04-20 RX ADMIN — Medication 3: at 13:29

## 2019-04-20 RX ADMIN — APIXABAN 5 MILLIGRAM(S): 2.5 TABLET, FILM COATED ORAL at 05:37

## 2019-04-20 RX ADMIN — Medication 1 TABLET(S): at 10:30

## 2019-04-20 RX ADMIN — Medication 3 MILLILITER(S): at 17:43

## 2019-04-20 RX ADMIN — Medication 1 DROP(S): at 05:36

## 2019-04-20 RX ADMIN — SODIUM CHLORIDE 4 MILLILITER(S): 9 INJECTION INTRAMUSCULAR; INTRAVENOUS; SUBCUTANEOUS at 05:37

## 2019-04-20 RX ADMIN — Medication 500 MILLIGRAM(S): at 10:30

## 2019-04-20 RX ADMIN — APIXABAN 5 MILLIGRAM(S): 2.5 TABLET, FILM COATED ORAL at 17:44

## 2019-04-20 RX ADMIN — Medication 300 MILLIGRAM(S): at 10:30

## 2019-04-20 RX ADMIN — GABAPENTIN 300 MILLIGRAM(S): 400 CAPSULE ORAL at 10:41

## 2019-04-20 RX ADMIN — Medication 3 MILLILITER(S): at 23:02

## 2019-04-20 RX ADMIN — Medication 2: at 10:30

## 2019-04-20 RX ADMIN — SODIUM CHLORIDE 4 MILLILITER(S): 9 INJECTION INTRAMUSCULAR; INTRAVENOUS; SUBCUTANEOUS at 23:02

## 2019-04-20 RX ADMIN — Medication 3 MILLILITER(S): at 05:36

## 2019-04-20 RX ADMIN — Medication 2 MILLIGRAM(S): at 10:54

## 2019-04-20 RX ADMIN — MEROPENEM 100 MILLIGRAM(S): 1 INJECTION INTRAVENOUS at 17:42

## 2019-04-20 RX ADMIN — TIZANIDINE 4 MILLIGRAM(S): 4 TABLET ORAL at 21:26

## 2019-04-20 RX ADMIN — SODIUM CHLORIDE 4 MILLILITER(S): 9 INJECTION INTRAMUSCULAR; INTRAVENOUS; SUBCUTANEOUS at 17:43

## 2019-04-20 RX ADMIN — Medication 1.5 MILLIGRAM(S): at 21:30

## 2019-04-20 RX ADMIN — TIZANIDINE 4 MILLIGRAM(S): 4 TABLET ORAL at 10:30

## 2019-04-20 NOTE — PROGRESS NOTE ADULT - SUBJECTIVE AND OBJECTIVE BOX
---___---___---___---___---___---___ ---___---___---___---___---___---___---___---___---___---                    <<<  M E D I C A L   A T T E N D I N G    F O L L O W    U P   N O T E  >>>    fevers improved patient more stable      ---___---___---___---___---___---      <<<  MEDICATIONS:  >>>    MEDICATIONS  (STANDING):  ALBUTerol    90 MICROgram(s) HFA Inhaler 1 Puff(s) Inhalation every 4 hours  ALBUTerol/ipratropium for Nebulization 3 milliLiter(s) Nebulizer every 6 hours  apixaban 5 milliGRAM(s) Oral every 12 hours  artificial tears (preservative free) Ophthalmic Solution 1 Drop(s) Both EYES three times a day  ascorbic acid 500 milliGRAM(s) Oral daily  buDESOnide   0.5 milliGRAM(s) Respule 0.5 milliGRAM(s) Inhalation every 12 hours  dextrose 5%. 1000 milliLiter(s) (50 mL/Hr) IV Continuous <Continuous>  dextrose 50% Injectable 12.5 Gram(s) IV Push once  dextrose 50% Injectable 25 Gram(s) IV Push once  dextrose 50% Injectable 25 Gram(s) IV Push once  ergocalciferol 26279 Unit(s) Oral every week  famotidine    Tablet 20 milliGRAM(s) Oral daily  ferrous    sulfate Liquid 300 milliGRAM(s) Enteral Tube daily  gabapentin   Solution 300 milliGRAM(s) Oral two times a day  insulin lispro (HumaLOG) corrective regimen sliding scale   SubCutaneous three times a day before meals  insulin lispro (HumaLOG) corrective regimen sliding scale   SubCutaneous at bedtime  loperamide Solution 2 milliGRAM(s) Enteral Tube daily  Medical Marijuana Oil 1 milliLiter(s) 1 milliLiter(s) Oral <User Schedule>  meropenem  IVPB 1000 milliGRAM(s) IV Intermittent every 12 hours  multivitamin/minerals 1 Tablet(s) Oral daily  petrolatum white Ointment 1 Application(s) Topical three times a day  predniSONE   Tablet   Oral   predniSONE   Tablet 7.5 milliGRAM(s) Oral daily  QUEtiapine 25 milliGRAM(s) Oral at bedtime  sodium chloride 0.9% Bolus 500 milliLiter(s) IV Bolus once  sodium chloride 3%  Inhalation 4 milliLiter(s) Inhalation every 6 hours  tiotropium 18 MICROgram(s) Capsule 1 Capsule(s) Inhalation daily  tiZANidine 4 milliGRAM(s) Oral <User Schedule>  vancomycin  IVPB 1000 milliGRAM(s) IV Intermittent every 24 hours      MEDICATIONS  (PRN):  acetaminophen    Suspension .. 650 milliGRAM(s) Oral every 6 hours PRN Temp greater or equal to 38C (100.4F), Mild Pain (1 - 3), Moderate Pain (4 - 6)  dextrose 40% Gel 15 Gram(s) Oral once PRN Blood Glucose LESS THAN 70 milliGRAM(s)/deciLiter  diphenhydrAMINE   Elixir 25 milliGRAM(s) Oral every 6 hours PRN Rash and/or Itching  glucagon  Injectable 1 milliGRAM(s) IntraMuscular once PRN Glucose <70 milliGRAM(s)/deciLiter  nystatin Powder 1 Application(s) Topical two times a day PRN rash/itchiness  oxyCODONE    IR 5 milliGRAM(s) Oral every 6 hours PRN Moderate Pain (4 - 6)/Severe Pain (7 - 10)  sodium chloride 0.65% Nasal 1 Spray(s) Both Nostrils three times a day PRN Nasal Congestion       ---___---___---___---___---___---     <<<REVIEW OF SYSTEM: >>>    unable to obtain      ---___---___---___---___---___---          <<<  VITAL SIGNS: >>>    T(F): 98.7 (19 @ 04:54), Max: 101.5 (19 @ 19:38)  HR: 95 (19 @ 04:54) (95 - 118)  BP: 118/66 (19 @ 04:54) (97/56 - 132/73)  RR: 18 (19 @ 04:54) (16 - 21)  SpO2: 100% (19 @ 04:54) (100% - 100%)  Wt(kg): --  CAPILLARY BLOOD GLUCOSE      POCT Blood Glucose.: 293 mg/dL (2019 23:31)    I&O's Summary    2019 07:01  -  2019 07:00  --------------------------------------------------------  IN: 1610 mL / OUT: 1150 mL / NET: 460 mL         ---___---___---___---___---___---                       PHYSICAL EXAM:    GEN: A&O X 0 , NAD , comfortable  HEENT: NCAT, PERRL, MMM, no scleral icterus, hearing intact  NECK: Supple, No JVD  CVS: S1S2 , regular , No M/R/G appreciated  PULM: CTA B/L,  no W/R/R appreciated  ABD.: soft. non tender, non distended,  bowel sounds present peg noted   Extrem: intact pulses , no edema noted  Derm: sacral decubitus noted   anxious      ---___---___---___---___---___---     <<<  LAB AND IMAGING: >>>                          8.5    15.8  )-----------( 226      ( 2019 06:09 )             24.5                   132<L>  |  97  |  38<H>  ----------------------------<  278<H>  4.0   |  25  |  <0.30<L>    Ca    8.2<L>      2019 06:09    TPro  5.1<L>  /  Alb  2.6<L>  /  TBili  0.4  /  DBili  x   /  AST  23  /  ALT  21  /  AlkPhos  125<H>      PT/INR - ( 2019 11:57 )   PT: 15.4 sec;   INR: 1.33 ratio         PTT - ( 2019 11:57 )  PTT:33.4 sec       Urinalysis Basic - ( 2019 22:26 )    Color: Yellow / Appearance: Slightly Turbid / S.019 / pH: x  Gluc: x / Ketone: Negative  / Bili: Negative / Urobili: Negative   Blood: x / Protein: Trace / Nitrite: Negative   Leuk Esterase: Large / RBC: 5 /hpf /  /HPF   Sq Epi: x / Non Sq Epi: 0 / Bacteria: Negative                ABG - ( 2019 22:20 )  pH, Arterial: 7.54  pH, Blood: x     /  pCO2: 30    /  pO2: 64    / HCO3: 25    / Base Excess: 2.9   /  SaO2: 94                      [All pertinent / recent available Imaging reports and other labs reviewed]     ---___---___---___---___---___---___ ---___---___---___---___---           <<<  A S S E S S M E N T   A N D   P L A N :  >>>    fever improved   anemia  h/h improved   sacral decubitus continue wound care  fractured left leg supportive care  and pain management  anxiety continue klonopin  and seroquel  medical marian for pain control  chf systolic ef 20%  will monitor   hold iv  fluids seems to have edema in the lower extremity   infection chronic mrsa spacer on vanco  wbc improved after fluid given . liky volume contracted consider dc merem   if afebrile in 24 hours dc home  change gavin prior to dc   -GI/DVT Prophylaxis. eliquis   diarrhea on immodium  to help with bowel moevement   anemai contiue ferrous sulfate   hyperglycemia contiue riss   change to metformin via peg    --------------------------------------------  Case discussed with   Education given on     >>______________________<<      Deniz Bolden .         phone   [phone]

## 2019-04-21 LAB
ANION GAP SERPL CALC-SCNC: 10 MMOL/L — SIGNIFICANT CHANGE UP (ref 5–17)
BUN SERPL-MCNC: 33 MG/DL — HIGH (ref 7–23)
C DIFF GDH STL QL: NEGATIVE — SIGNIFICANT CHANGE UP
C DIFF GDH STL QL: SIGNIFICANT CHANGE UP
CALCIUM SERPL-MCNC: 8.7 MG/DL — SIGNIFICANT CHANGE UP (ref 8.4–10.5)
CHLORIDE SERPL-SCNC: 94 MMOL/L — LOW (ref 96–108)
CO2 SERPL-SCNC: 27 MMOL/L — SIGNIFICANT CHANGE UP (ref 22–31)
CREAT SERPL-MCNC: <0.3 MG/DL — LOW (ref 0.5–1.3)
GLUCOSE BLDC GLUCOMTR-MCNC: 149 MG/DL — HIGH (ref 70–99)
GLUCOSE BLDC GLUCOMTR-MCNC: 156 MG/DL — HIGH (ref 70–99)
GLUCOSE BLDC GLUCOMTR-MCNC: 177 MG/DL — HIGH (ref 70–99)
GLUCOSE BLDC GLUCOMTR-MCNC: 240 MG/DL — HIGH (ref 70–99)
GLUCOSE SERPL-MCNC: 156 MG/DL — HIGH (ref 70–99)
HCT VFR BLD CALC: 26.3 % — LOW (ref 34.5–45)
HGB BLD-MCNC: 8.8 G/DL — LOW (ref 11.5–15.5)
MCHC RBC-ENTMCNC: 31.4 PG — SIGNIFICANT CHANGE UP (ref 27–34)
MCHC RBC-ENTMCNC: 33.6 GM/DL — SIGNIFICANT CHANGE UP (ref 32–36)
MCV RBC AUTO: 93.5 FL — SIGNIFICANT CHANGE UP (ref 80–100)
PLATELET # BLD AUTO: 263 K/UL — SIGNIFICANT CHANGE UP (ref 150–400)
POTASSIUM SERPL-MCNC: 3.9 MMOL/L — SIGNIFICANT CHANGE UP (ref 3.5–5.3)
POTASSIUM SERPL-SCNC: 3.9 MMOL/L — SIGNIFICANT CHANGE UP (ref 3.5–5.3)
RBC # BLD: 2.81 M/UL — LOW (ref 3.8–5.2)
RBC # FLD: 16.6 % — HIGH (ref 10.3–14.5)
SODIUM SERPL-SCNC: 131 MMOL/L — LOW (ref 135–145)
WBC # BLD: 12.5 K/UL — HIGH (ref 3.8–10.5)
WBC # FLD AUTO: 12.5 K/UL — HIGH (ref 3.8–10.5)

## 2019-04-21 PROCEDURE — 99232 SBSQ HOSP IP/OBS MODERATE 35: CPT

## 2019-04-21 RX ADMIN — Medication 300 MILLIGRAM(S): at 09:37

## 2019-04-21 RX ADMIN — Medication 1 TABLET(S): at 09:37

## 2019-04-21 RX ADMIN — APIXABAN 5 MILLIGRAM(S): 2.5 TABLET, FILM COATED ORAL at 05:47

## 2019-04-21 RX ADMIN — Medication 1 APPLICATION(S): at 21:37

## 2019-04-21 RX ADMIN — SODIUM CHLORIDE 4 MILLILITER(S): 9 INJECTION INTRAMUSCULAR; INTRAVENOUS; SUBCUTANEOUS at 17:22

## 2019-04-21 RX ADMIN — SODIUM CHLORIDE 4 MILLILITER(S): 9 INJECTION INTRAMUSCULAR; INTRAVENOUS; SUBCUTANEOUS at 13:21

## 2019-04-21 RX ADMIN — Medication 1 DROP(S): at 13:23

## 2019-04-21 RX ADMIN — MEROPENEM 100 MILLIGRAM(S): 1 INJECTION INTRAVENOUS at 17:22

## 2019-04-21 RX ADMIN — Medication 25 MILLIGRAM(S): at 09:37

## 2019-04-21 RX ADMIN — FAMOTIDINE 20 MILLIGRAM(S): 10 INJECTION INTRAVENOUS at 09:38

## 2019-04-21 RX ADMIN — APIXABAN 5 MILLIGRAM(S): 2.5 TABLET, FILM COATED ORAL at 17:22

## 2019-04-21 RX ADMIN — Medication 250 MILLIGRAM(S): at 10:48

## 2019-04-21 RX ADMIN — GABAPENTIN 300 MILLIGRAM(S): 400 CAPSULE ORAL at 10:47

## 2019-04-21 RX ADMIN — Medication 1 DROP(S): at 05:47

## 2019-04-21 RX ADMIN — TIZANIDINE 4 MILLIGRAM(S): 4 TABLET ORAL at 21:32

## 2019-04-21 RX ADMIN — MEROPENEM 100 MILLIGRAM(S): 1 INJECTION INTRAVENOUS at 05:48

## 2019-04-21 RX ADMIN — Medication 2 MILLIGRAM(S): at 10:47

## 2019-04-21 RX ADMIN — Medication 0.5 MILLIGRAM(S): at 17:22

## 2019-04-21 RX ADMIN — Medication 1 APPLICATION(S): at 05:49

## 2019-04-21 RX ADMIN — Medication 1 APPLICATION(S): at 13:23

## 2019-04-21 RX ADMIN — GABAPENTIN 300 MILLIGRAM(S): 400 CAPSULE ORAL at 21:31

## 2019-04-21 RX ADMIN — Medication 1: at 09:38

## 2019-04-21 RX ADMIN — Medication 1: at 18:45

## 2019-04-21 RX ADMIN — Medication 0.5 MILLIGRAM(S): at 05:48

## 2019-04-21 RX ADMIN — QUETIAPINE FUMARATE 25 MILLIGRAM(S): 200 TABLET, FILM COATED ORAL at 21:33

## 2019-04-21 RX ADMIN — Medication 650 MILLIGRAM(S): at 17:51

## 2019-04-21 RX ADMIN — SODIUM CHLORIDE 4 MILLILITER(S): 9 INJECTION INTRAMUSCULAR; INTRAVENOUS; SUBCUTANEOUS at 05:47

## 2019-04-21 RX ADMIN — Medication 1.5 MILLIGRAM(S): at 21:32

## 2019-04-21 RX ADMIN — Medication 3 MILLILITER(S): at 05:46

## 2019-04-21 RX ADMIN — Medication 7.5 MILLIGRAM(S): at 05:47

## 2019-04-21 RX ADMIN — Medication 1 DROP(S): at 21:32

## 2019-04-21 RX ADMIN — Medication 3 MILLILITER(S): at 17:22

## 2019-04-21 RX ADMIN — Medication 500 MILLIGRAM(S): at 09:38

## 2019-04-21 RX ADMIN — Medication 3 MILLILITER(S): at 13:21

## 2019-04-21 RX ADMIN — Medication 2: at 13:22

## 2019-04-21 RX ADMIN — TIZANIDINE 4 MILLIGRAM(S): 4 TABLET ORAL at 09:38

## 2019-04-21 NOTE — PROGRESS NOTE ADULT - SUBJECTIVE AND OBJECTIVE BOX
Patient is a 68y old  Female who presents with a chief complaint of fevers (20 Apr 2019 08:18)    Being followed by ID for fever    Interval history:  Afebrile after 4/19  No acute events      ROS:  Unable due to cognitive status      Antimicrobials:    meropenem  IVPB 1000 milliGRAM(s) IV Intermittent every 12 hours  vancomycin  IVPB 1000 milliGRAM(s) IV Intermittent every 24 hours      Vital Signs Last 24 Hrs  T(C): 36.8 (04-21-19 @ 09:17), Max: 37.4 (04-20-19 @ 21:05)  T(F): 98.2 (04-21-19 @ 09:17), Max: 99.4 (04-20-19 @ 21:05)  HR: 105 (04-21-19 @ 09:17) (92 - 111)  BP: 123/66 (04-21-19 @ 09:17) (101/59 - 134/75)  BP(mean): --  RR: 18 (04-21-19 @ 09:17) (18 - 19)  SpO2: 100% (04-21-19 @ 09:17) (100% - 100%)    Physical Exam:    Constitutional well preserved, NAD    HEENT No pallor or icterus    Neck supple    Chest Clear to auscultation    CVS S1 S2 WNl No murmur or rub or gallop    Abd soft BS normal No tenderness no masses    Ext No cyanosis clubbing or edema, Left UE PICC site clean    IV site no erythema tenderness or discharge    Joints no swelling or LOM    CNS non focal    Lab Data:                          8.8    12.5  )-----------( 263      ( 21 Apr 2019 06:44 )             26.3       04-21    131<L>  |  94<L>  |  33<H>  ----------------------------<  156<H>  3.9   |  27  |  <0.30<L>    Ca    8.7      21 Apr 2019 06:44          .Blood  04-19-19   No growth to date.  --  --      .Urine  04-19-19   >=3 organisms. Probable collection contamination.  --  --    < from: Xray Chest 1 View- PORTABLE-Urgent (04.19.19 @ 17:13) >  EXAM:  XR CHEST PORTABLE URGENT 1V                            PROCEDURE DATE:  04/19/2019            INTERPRETATION:  History: Fever. PICC line placement. Questionable free   air on prior radiograph.    Synovium of the chest was performed.    Comparison is made to April 19, 2018.    Findings:    Patient's chin up stairs left lung apex. There is a left-sided PICC line   in place with tip at the cavoatrial junction. The previously noted   lucency along the right hemidiaphragm is no longer evidenton the current   examination. There is mild pulmonary vascular congestion and trace   bilateral pleural effusions. Surgical clips are seen within the right   upper quadrant.    Impression:    Previously noted lucency along the right hemidiaphragm is no longer   evident. Mild pulmonary vascular congestion and trace bilateral pleural   effusions.    < end of copied text > Patient is a 68y old  Female who presents with a chief complaint of fevers (20 Apr 2019 08:18)    Being followed by ID for fever    Interval history:  Afebrile after 4/19  No acute events      ROS:  Unable due to cognitive status      Antimicrobials:    meropenem  IVPB 1000 milliGRAM(s) IV Intermittent every 12 hours  vancomycin  IVPB 1000 milliGRAM(s) IV Intermittent every 24 hours      Vital Signs Last 24 Hrs  T(C): 36.8 (04-21-19 @ 09:17), Max: 37.4 (04-20-19 @ 21:05)  T(F): 98.2 (04-21-19 @ 09:17), Max: 99.4 (04-20-19 @ 21:05)  HR: 105 (04-21-19 @ 09:17) (92 - 111)  BP: 123/66 (04-21-19 @ 09:17) (101/59 - 134/75)  BP(mean): --  RR: 18 (04-21-19 @ 09:17) (18 - 19)  SpO2: 100% (04-21-19 @ 09:17) (100% - 100%)    Physical Exam:    Constitutional frail, NAD    HEENT No pallor or icterus, NC )2    Neck stiff in all planes    Chest Clear to auscultation    CVS S1 S2 WNl No murmur or rub or gallop    Abd soft BS normal No tenderness no masses, PEG site clean, gavin  VAC dressing on back wound    Ext Contractures, Left UE PICC site clean    IV site no erythema tenderness or discharge    Joints no swelling or LOM    CNS non focal    Lab Data:                          8.8    12.5  )-----------( 263      ( 21 Apr 2019 06:44 )             26.3       04-21    131<L>  |  94<L>  |  33<H>  ----------------------------<  156<H>  3.9   |  27  |  <0.30<L>    Ca    8.7      21 Apr 2019 06:44          .Blood  04-19-19   No growth to date.  --  --      .Urine  04-19-19   >=3 organisms. Probable collection contamination.  --  --    < from: Xray Chest 1 View- PORTABLE-Urgent (04.19.19 @ 17:13) >  EXAM:  XR CHEST PORTABLE URGENT 1V                            PROCEDURE DATE:  04/19/2019            INTERPRETATION:  History: Fever. PICC line placement. Questionable free   air on prior radiograph.    Synovium of the chest was performed.    Comparison is made to April 19, 2018.    Findings:    Patient's chin up stairs left lung apex. There is a left-sided PICC line   in place with tip at the cavoatrial junction. The previously noted   lucency along the right hemidiaphragm is no longer evidenton the current   examination. There is mild pulmonary vascular congestion and trace   bilateral pleural effusions. Surgical clips are seen within the right   upper quadrant.    Impression:    Previously noted lucency along the right hemidiaphragm is no longer   evident. Mild pulmonary vascular congestion and trace bilateral pleural   effusions.    < end of copied text >

## 2019-04-21 NOTE — PROGRESS NOTE ADULT - ASSESSMENT
68 yr old with advanced dementia , bed bound, contracted, cva, sp removal of infected hip due to mrsa with placement of space last fall.   Pt has been havening fevers for months.  Suspect there is ongoing Om of the hip site but changing the spacer is not a realistic option . chronic Om of the sacrum usually does not cause fevers and does not need prolonged ab therapy .     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  Responding to current treatment, afebrile > 24 hours, declining WBC  Continue current antibiotics    ID is available over the weekend at [phone]  Jefe Huerta MD  pager [phone]  office [phone] 68 yr old with advanced dementia , bed bound, contracted, cva, sp removal of infected hip due to mrsa with placement of space last fall.   Pt has been havening fevers for months.  Suspect there is ongoing Om of the hip site but changing the spacer is not a realistic option . chronic Om of the sacrum usually does not cause fevers and does not need prolonged ab therapy .     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  Responding to current treatment, afebrile > 24 hours, declining WBC  Continue current antibiotics    ID is available over the weekend at [phone]  Jefe Huerta MD  pager [phone]  office [phone]

## 2019-04-21 NOTE — PROGRESS NOTE ADULT - SUBJECTIVE AND OBJECTIVE BOX
---___---___---___---___---___---___ ---___---___---___---___---___---___---___---___---___---                    <<<  M E D I C A L   A T T E N D I N G    F O L L O W    U P   N O T E  >>>    unchanged today doing better but has diarrhea will check for c diff      ---___---___---___---___---___---      <<<  MEDICATIONS:  >>>    MEDICATIONS  (STANDING):  ALBUTerol    90 MICROgram(s) HFA Inhaler 1 Puff(s) Inhalation every 4 hours  ALBUTerol/ipratropium for Nebulization 3 milliLiter(s) Nebulizer every 6 hours  apixaban 5 milliGRAM(s) Oral every 12 hours  artificial tears (preservative free) Ophthalmic Solution 1 Drop(s) Both EYES three times a day  ascorbic acid 500 milliGRAM(s) Oral daily  buDESOnide   0.5 milliGRAM(s) Respule 0.5 milliGRAM(s) Inhalation every 12 hours  clonazePAM Tablet 1.5 milliGRAM(s) Oral <User Schedule>  dextrose 5%. 1000 milliLiter(s) (50 mL/Hr) IV Continuous <Continuous>  dextrose 50% Injectable 12.5 Gram(s) IV Push once  dextrose 50% Injectable 25 Gram(s) IV Push once  dextrose 50% Injectable 25 Gram(s) IV Push once  ergocalciferol 89224 Unit(s) Oral every week  famotidine    Tablet 20 milliGRAM(s) Oral daily  ferrous    sulfate Liquid 300 milliGRAM(s) Enteral Tube daily  gabapentin   Solution 300 milliGRAM(s) Oral two times a day  insulin lispro (HumaLOG) corrective regimen sliding scale   SubCutaneous three times a day before meals  insulin lispro (HumaLOG) corrective regimen sliding scale   SubCutaneous at bedtime  loperamide Solution 2 milliGRAM(s) Enteral Tube daily  meropenem  IVPB 1000 milliGRAM(s) IV Intermittent every 12 hours  multivitamin/minerals 1 Tablet(s) Oral daily  petrolatum white Ointment 1 Application(s) Topical three times a day  predniSONE   Tablet   Oral   predniSONE   Tablet 7.5 milliGRAM(s) Oral daily  QUEtiapine 25 milliGRAM(s) Oral at bedtime  sodium chloride 0.9% Bolus 500 milliLiter(s) IV Bolus once  sodium chloride 3%  Inhalation 4 milliLiter(s) Inhalation every 6 hours  tiotropium 18 MICROgram(s) Capsule 1 Capsule(s) Inhalation daily  tiZANidine 4 milliGRAM(s) Oral <User Schedule>  vancomycin  IVPB 1000 milliGRAM(s) IV Intermittent every 24 hours      MEDICATIONS  (PRN):  acetaminophen    Suspension .. 650 milliGRAM(s) Oral every 6 hours PRN Temp greater or equal to 38C (100.4F), Mild Pain (1 - 3), Moderate Pain (4 - 6)  dextrose 40% Gel 15 Gram(s) Oral once PRN Blood Glucose LESS THAN 70 milliGRAM(s)/deciLiter  diphenhydrAMINE   Elixir 25 milliGRAM(s) Oral every 6 hours PRN Rash and/or Itching  glucagon  Injectable 1 milliGRAM(s) IntraMuscular once PRN Glucose <70 milliGRAM(s)/deciLiter  nystatin Powder 1 Application(s) Topical two times a day PRN rash/itchiness  sodium chloride 0.65% Nasal 1 Spray(s) Both Nostrils three times a day PRN Nasal Congestion       ---___---___---___---___---___---     <<<REVIEW OF SYSTEM: >>>    unable to obtain    ---___---___---___---___---___---          <<<  VITAL SIGNS: >>>    T(F): 98.2 (04-21-19 @ 09:17), Max: 99.4 (04-20-19 @ 21:05)  HR: 105 (04-21-19 @ 09:17) (92 - 111)  BP: 123/66 (04-21-19 @ 09:17) (101/59 - 134/75)  RR: 18 (04-21-19 @ 09:17) (18 - 19)  SpO2: 100% (04-21-19 @ 09:17) (100% - 100%)  Wt(kg): --  CAPILLARY BLOOD GLUCOSE      POCT Blood Glucose.: 177 mg/dL (21 Apr 2019 09:01)    I&O's Summary    20 Apr 2019 07:01  -  21 Apr 2019 07:00  --------------------------------------------------------  IN: 366 mL / OUT: 1200 mL / NET: -834 mL         ---___---___---___---___---___---                       PHYSICAL EXAM:    GEN: A&O X 0 , NAD , comfortable  HEENT: NCAT, PERRL, MMM, no scleral icterus, hearing intact  NECK: Supple, No JVD  CVS: S1S2 , regular , No M/R/G appreciated  PULM: CTA B/L,  no W/R/R appreciated  ABD.: soft. non tender, non distended,  bowel sounds present peg noted   Extrem: intact pulses , no edema noted ;eft leg brace noted   Derm: No rash or ecchymosis noted stage 4 sacral decubitus noted to the back         ---___---___---___---___---___---     <<<  LAB AND IMAGING: >>>                          8.8    12.5  )-----------( 263      ( 21 Apr 2019 06:44 )             26.3               04-21    131<L>  |  94<L>  |  33<H>  ----------------------------<  156<H>  3.9   |  27  |  <0.30<L>    Ca    8.7      21 Apr 2019 06:44                                 [All pertinent / recent available Imaging reports and other labs reviewed]     ---___---___---___---___---___---___ ---___---___---___---___---           <<<  A S S E S S M E N T   A N D   P L A N :  >>>  stage 4 sacral decubitus continue wound vac   fracture left leg continue pain management   mrsa chronic infection continue  iv a vanco dr carrizales to follow up outpatient   GI/DVT Prophylaxis. on eliquis   dementia supportive care   chronic contractures supportive care   medical marijuana for pain   check stool for c diff   agitation on seroquel and remeron  anemia cotnie ferrous sulfate     --------------------------------------------  Case discussed with mr roque    Education given on     >>______________________<<      Deniz Bolden .         phone   [phone]

## 2019-04-22 LAB
ANION GAP SERPL CALC-SCNC: 13 MMOL/L — SIGNIFICANT CHANGE UP (ref 5–17)
BUN SERPL-MCNC: 34 MG/DL — HIGH (ref 7–23)
CALCIUM SERPL-MCNC: 8.5 MG/DL — SIGNIFICANT CHANGE UP (ref 8.4–10.5)
CHLORIDE SERPL-SCNC: 90 MMOL/L — LOW (ref 96–108)
CO2 SERPL-SCNC: 28 MMOL/L — SIGNIFICANT CHANGE UP (ref 22–31)
CREAT SERPL-MCNC: <0.3 MG/DL — LOW (ref 0.5–1.3)
GLUCOSE BLDC GLUCOMTR-MCNC: 210 MG/DL — HIGH (ref 70–99)
GLUCOSE BLDC GLUCOMTR-MCNC: 218 MG/DL — HIGH (ref 70–99)
GLUCOSE BLDC GLUCOMTR-MCNC: 255 MG/DL — HIGH (ref 70–99)
GLUCOSE BLDC GLUCOMTR-MCNC: 92 MG/DL — SIGNIFICANT CHANGE UP (ref 70–99)
GLUCOSE SERPL-MCNC: 126 MG/DL — HIGH (ref 70–99)
POTASSIUM SERPL-MCNC: 4.1 MMOL/L — SIGNIFICANT CHANGE UP (ref 3.5–5.3)
POTASSIUM SERPL-SCNC: 4.1 MMOL/L — SIGNIFICANT CHANGE UP (ref 3.5–5.3)
SODIUM SERPL-SCNC: 131 MMOL/L — LOW (ref 135–145)

## 2019-04-22 PROCEDURE — 99232 SBSQ HOSP IP/OBS MODERATE 35: CPT

## 2019-04-22 RX ORDER — VANCOMYCIN HCL 1 G
1 VIAL (EA) INTRAVENOUS
Qty: 1 | Refills: 0 | OUTPATIENT
Start: 2019-04-22 | End: 2019-05-01

## 2019-04-22 RX ADMIN — Medication 1 DROP(S): at 06:25

## 2019-04-22 RX ADMIN — Medication 2 MILLIGRAM(S): at 13:06

## 2019-04-22 RX ADMIN — Medication 0.5 MILLIGRAM(S): at 19:07

## 2019-04-22 RX ADMIN — Medication 7.5 MILLIGRAM(S): at 06:25

## 2019-04-22 RX ADMIN — FAMOTIDINE 20 MILLIGRAM(S): 10 INJECTION INTRAVENOUS at 10:26

## 2019-04-22 RX ADMIN — Medication 1 APPLICATION(S): at 15:15

## 2019-04-22 RX ADMIN — APIXABAN 5 MILLIGRAM(S): 2.5 TABLET, FILM COATED ORAL at 06:24

## 2019-04-22 RX ADMIN — Medication 1.5 MILLIGRAM(S): at 22:12

## 2019-04-22 RX ADMIN — ERGOCALCIFEROL 50000 UNIT(S): 1.25 CAPSULE ORAL at 10:25

## 2019-04-22 RX ADMIN — Medication 300 MILLIGRAM(S): at 10:26

## 2019-04-22 RX ADMIN — Medication 2: at 13:09

## 2019-04-22 RX ADMIN — MEROPENEM 100 MILLIGRAM(S): 1 INJECTION INTRAVENOUS at 06:26

## 2019-04-22 RX ADMIN — Medication 650 MILLIGRAM(S): at 04:52

## 2019-04-22 RX ADMIN — TIZANIDINE 4 MILLIGRAM(S): 4 TABLET ORAL at 22:12

## 2019-04-22 RX ADMIN — Medication 3: at 09:42

## 2019-04-22 RX ADMIN — QUETIAPINE FUMARATE 25 MILLIGRAM(S): 200 TABLET, FILM COATED ORAL at 22:12

## 2019-04-22 RX ADMIN — SODIUM CHLORIDE 4 MILLILITER(S): 9 INJECTION INTRAMUSCULAR; INTRAVENOUS; SUBCUTANEOUS at 06:25

## 2019-04-22 RX ADMIN — Medication 3 MILLILITER(S): at 19:08

## 2019-04-22 RX ADMIN — Medication 3 MILLILITER(S): at 23:29

## 2019-04-22 RX ADMIN — Medication 500 MILLIGRAM(S): at 10:26

## 2019-04-22 RX ADMIN — Medication 1 APPLICATION(S): at 06:27

## 2019-04-22 RX ADMIN — Medication 1 TABLET(S): at 10:26

## 2019-04-22 RX ADMIN — Medication 0.5 MILLIGRAM(S): at 06:27

## 2019-04-22 RX ADMIN — Medication 650 MILLIGRAM(S): at 04:30

## 2019-04-22 RX ADMIN — GABAPENTIN 300 MILLIGRAM(S): 400 CAPSULE ORAL at 08:59

## 2019-04-22 RX ADMIN — Medication 250 MILLIGRAM(S): at 10:25

## 2019-04-22 RX ADMIN — APIXABAN 5 MILLIGRAM(S): 2.5 TABLET, FILM COATED ORAL at 19:08

## 2019-04-22 RX ADMIN — SODIUM CHLORIDE 4 MILLILITER(S): 9 INJECTION INTRAMUSCULAR; INTRAVENOUS; SUBCUTANEOUS at 19:07

## 2019-04-22 RX ADMIN — Medication 1 APPLICATION(S): at 22:15

## 2019-04-22 RX ADMIN — Medication 25 MILLIGRAM(S): at 08:59

## 2019-04-22 RX ADMIN — SODIUM CHLORIDE 4 MILLILITER(S): 9 INJECTION INTRAMUSCULAR; INTRAVENOUS; SUBCUTANEOUS at 00:54

## 2019-04-22 RX ADMIN — GABAPENTIN 300 MILLIGRAM(S): 400 CAPSULE ORAL at 22:11

## 2019-04-22 RX ADMIN — Medication 3 MILLILITER(S): at 06:25

## 2019-04-22 RX ADMIN — Medication 3 MILLILITER(S): at 13:08

## 2019-04-22 RX ADMIN — Medication 650 MILLIGRAM(S): at 19:24

## 2019-04-22 RX ADMIN — TIZANIDINE 4 MILLIGRAM(S): 4 TABLET ORAL at 10:26

## 2019-04-22 RX ADMIN — SODIUM CHLORIDE 4 MILLILITER(S): 9 INJECTION INTRAMUSCULAR; INTRAVENOUS; SUBCUTANEOUS at 13:08

## 2019-04-22 RX ADMIN — Medication 1 DROP(S): at 13:08

## 2019-04-22 RX ADMIN — SODIUM CHLORIDE 4 MILLILITER(S): 9 INJECTION INTRAMUSCULAR; INTRAVENOUS; SUBCUTANEOUS at 23:29

## 2019-04-22 RX ADMIN — Medication 3 MILLILITER(S): at 00:54

## 2019-04-22 NOTE — CHART NOTE - NSCHARTNOTEFT_GEN_A_CORE
Nutrition Follow Up Note  Patient seen for: follow up     · Reason for Admission	fevers  68 yr old with advanced dementia , bed bound, contracted, cva, sp removal of infected hip due to mrsa with placement of space last fall.   Pt has been havening fevers for months.  Suspect there is ongoing Om of the hip site but changing the spacer is not a realistic option . chronic Om of the sacrum usually does not cause fevers and does not need prolonged ab therapy .    pt has been interm been febrile for months, suspect that is it mostly related to a mrsa infection of the space in her hip that can not be removed.    plan for discharge home tomorrow    Source: , chart, Diet Office    Diet : ENTERAL, NPO/ENTERAL      Patient reports: nonverbal  spouse concerned about Glucerna 1.5 affected pt  blood sugars, requesting Glucerna 1.2-will provide. see recommendations below.          Enteral /Parenteral Nutrition:    Glucerna 1.5 (4 cans daily) provides 1424calories/78grams protein/720g/mlwater) 34kcal/kg and 1.8grams protein/kg. based on IBW of 42kg.    Daily Weight in k.6 (04-22), Weight in k.6 (-21), Weight in k.7 (04-19), Weight in k.5 (-18), Weight in k.1 (04-17), Weight in k (04-16)  % Weight Change: 29% since 3/27    Pertinent Medications: MEDICATIONS  (STANDING):  ALBUTerol    90 MICROgram(s) HFA Inhaler 1 Puff(s) Inhalation every 4 hours  ALBUTerol/ipratropium for Nebulization 3 milliLiter(s) Nebulizer every 6 hours  apixaban 5 milliGRAM(s) Oral every 12 hours  artificial tears (preservative free) Ophthalmic Solution 1 Drop(s) Both EYES three times a day  ascorbic acid 500 milliGRAM(s) Oral daily  buDESOnide   0.5 milliGRAM(s) Respule 0.5 milliGRAM(s) Inhalation every 12 hours  clonazePAM Tablet 1.5 milliGRAM(s) Oral <User Schedule>  dextrose 5%. 1000 milliLiter(s) (50 mL/Hr) IV Continuous <Continuous>  dextrose 50% Injectable 12.5 Gram(s) IV Push once  dextrose 50% Injectable 25 Gram(s) IV Push once  dextrose 50% Injectable 25 Gram(s) IV Push once  ergocalciferol 90519 Unit(s) Oral every week  famotidine    Tablet 20 milliGRAM(s) Oral daily  ferrous    sulfate Liquid 300 milliGRAM(s) Enteral Tube daily  gabapentin   Solution 300 milliGRAM(s) Oral two times a day  insulin lispro (HumaLOG) corrective regimen sliding scale   SubCutaneous three times a day before meals  insulin lispro (HumaLOG) corrective regimen sliding scale   SubCutaneous at bedtime  loperamide Solution 2 milliGRAM(s) Enteral Tube daily  multivitamin/minerals 1 Tablet(s) Oral daily  petrolatum white Ointment 1 Application(s) Topical three times a day  predniSONE   Tablet   Oral   predniSONE   Tablet 7.5 milliGRAM(s) Oral daily  QUEtiapine 25 milliGRAM(s) Oral at bedtime  sodium chloride 0.9% Bolus 500 milliLiter(s) IV Bolus once  sodium chloride 3%  Inhalation 4 milliLiter(s) Inhalation every 6 hours  tiotropium 18 MICROgram(s) Capsule 1 Capsule(s) Inhalation daily  tiZANidine 4 milliGRAM(s) Oral <User Schedule>  vancomycin  IVPB 1000 milliGRAM(s) IV Intermittent every 24 hours    MEDICATIONS  (PRN):  acetaminophen    Suspension .. 650 milliGRAM(s) Oral every 6 hours PRN Temp greater or equal to 38C (100.4F), Mild Pain (1 - 3), Moderate Pain (4 - 6)  dextrose 40% Gel 15 Gram(s) Oral once PRN Blood Glucose LESS THAN 70 milliGRAM(s)/deciLiter  diphenhydrAMINE   Elixir 25 milliGRAM(s) Oral every 6 hours PRN Rash and/or Itching  glucagon  Injectable 1 milliGRAM(s) IntraMuscular once PRN Glucose <70 milliGRAM(s)/deciLiter  nystatin Powder 1 Application(s) Topical two times a day PRN rash/itchiness  sodium chloride 0.65% Nasal 1 Spray(s) Both Nostrils three times a day PRN Nasal Congestion    Pertinent Labs:  @ 05:22: Na 131<L>, BUN 34<H>, Cr <0.30<L>, <H>, K+ 4.1    Finger Sticks:  POCT Blood Glucose.: 218 mg/dL ( @ 12:59)  POCT Blood Glucose.: 255 mg/dL ( @ 08:55)  POCT Blood Glucose.: 149 mg/dL ( @ 21:28)  POCT Blood Glucose.: 156 mg/dL ( @ 18:03)      Skin per nursing documentation: stage 4 sacrum, stage 3 right hip  Edema: +1 left knee    Estimated Needs:   [x ] no change since previous assessment  [ ] recalculated:       Previous Nutrition Diagnosis:  Increased nutrient needs (specify); calories/protein  Nutrition Diagnosis is: ongoing-being addressed with supplement         Interventions:     Recommend  1) Glucerna 1.2  ( 4 cans daily). provides 1140kcal/57grams protein. 27kcal/kg and 1.4grams protein/kg. based on IBW of 42kg. fluid deferred to MD  2)continue Danactive x 2 daily  3)continue tube feed pro source x 2 daily  4)continue multivitamin and VitC daily  5)continue 3 packs of bantrol daily as per provider to RN.       Continue to monitor Nutritional intake, Tolerance to diet prescription, weights, labs, skin integrity    RD remains available upon request and will follow up per protocol  Anne Marie Morgan MA, RD, CDN #[phone]

## 2019-04-22 NOTE — PROGRESS NOTE ADULT - ASSESSMENT
68 yr old with advanced dementia , bed bound, contracted, cva, sp removal of infected hip due to mrsa with placement of space last fall.   Pt has been havening fevers for months.  Suspect there is ongoing Om of the hip site but changing the spacer is not a realistic option . chronic Om of the sacrum usually does not cause fevers and does not need prolonged ab therapy .     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      pt has been interm been febrile for months   i still suspect that is it mostly related to a mrsa infection of the space in her hip that can npt be removed.  will stop meropenem again.  would not restart ab for fever unless she has  accompanying signs of sepsis

## 2019-04-22 NOTE — CHART NOTE - NSCHARTNOTEFT_GEN_A_CORE
Per NP request 3 fracture socks delivered. Orthosis removed sock changed and skin check done. Daughter present. To notify office with any other issues questions or concerns.  Kulwant BOB  [facility]  [phone]

## 2019-04-22 NOTE — PROGRESS NOTE ADULT - SUBJECTIVE AND OBJECTIVE BOX
infectious diseases progress note:    Patient is a 68y old  Female who presents with a chief complaint of fevers (22 Apr 2019 06:33)        Fever           Allergies    ASA; dye contrast (Anaphylaxis)  aspirin (Short breath)  divalproex sodium (Other (Unknown))  Flowers and Plants (Short breath)  Haldol (Other (Unknown))  penicillin (Short breath; Rash)  sulfa drugs (Short breath; Rash)  vancomycin (Rash; Urticaria; Hives)  Xanax (Other (Unknown))    Intolerances        ANTIBIOTICS/RELEVANT:  antimicrobials  vancomycin  IVPB 1000 milliGRAM(s) IV Intermittent every 24 hours    immunologic:    OTHER:  acetaminophen    Suspension .. 650 milliGRAM(s) Oral every 6 hours PRN  ALBUTerol    90 MICROgram(s) HFA Inhaler 1 Puff(s) Inhalation every 4 hours  ALBUTerol/ipratropium for Nebulization 3 milliLiter(s) Nebulizer every 6 hours  apixaban 5 milliGRAM(s) Oral every 12 hours  artificial tears (preservative free) Ophthalmic Solution 1 Drop(s) Both EYES three times a day  ascorbic acid 500 milliGRAM(s) Oral daily  buDESOnide   0.5 milliGRAM(s) Respule 0.5 milliGRAM(s) Inhalation every 12 hours  clonazePAM Tablet 1.5 milliGRAM(s) Oral <User Schedule>  dextrose 40% Gel 15 Gram(s) Oral once PRN  dextrose 5%. 1000 milliLiter(s) IV Continuous <Continuous>  dextrose 50% Injectable 12.5 Gram(s) IV Push once  dextrose 50% Injectable 25 Gram(s) IV Push once  dextrose 50% Injectable 25 Gram(s) IV Push once  diphenhydrAMINE   Elixir 25 milliGRAM(s) Oral every 6 hours PRN  ergocalciferol 59642 Unit(s) Oral every week  famotidine    Tablet 20 milliGRAM(s) Oral daily  ferrous    sulfate Liquid 300 milliGRAM(s) Enteral Tube daily  gabapentin   Solution 300 milliGRAM(s) Oral two times a day  glucagon  Injectable 1 milliGRAM(s) IntraMuscular once PRN  insulin lispro (HumaLOG) corrective regimen sliding scale   SubCutaneous three times a day before meals  insulin lispro (HumaLOG) corrective regimen sliding scale   SubCutaneous at bedtime  loperamide Solution 2 milliGRAM(s) Enteral Tube daily  multivitamin/minerals 1 Tablet(s) Oral daily  nystatin Powder 1 Application(s) Topical two times a day PRN  petrolatum white Ointment 1 Application(s) Topical three times a day  predniSONE   Tablet   Oral   predniSONE   Tablet 7.5 milliGRAM(s) Oral daily  QUEtiapine 25 milliGRAM(s) Oral at bedtime  sodium chloride 0.65% Nasal 1 Spray(s) Both Nostrils three times a day PRN  sodium chloride 0.9% Bolus 500 milliLiter(s) IV Bolus once  sodium chloride 3%  Inhalation 4 milliLiter(s) Inhalation every 6 hours  tiotropium 18 MICROgram(s) Capsule 1 Capsule(s) Inhalation daily  tiZANidine 4 milliGRAM(s) Oral <User Schedule>      Objective:  Vital Signs Last 24 Hrs  T(C): 36 (22 Apr 2019 06:49), Max: 38.7 (21 Apr 2019 17:44)  T(F): 96.8 (22 Apr 2019 06:49), Max: 101.7 (21 Apr 2019 17:44)  HR: 116 (22 Apr 2019 04:32) (101 - 121)  BP: 108/71 (22 Apr 2019 04:32) (108/71 - 134/82)  BP(mean): --  RR: 19 (22 Apr 2019 04:32) (18 - 19)  SpO2: 100% (22 Apr 2019 04:32) (100% - 100%)    PHYSICAL EXAM:     Eyes:JACQUELIN, EOMI  Ear/Nose/Throat: no oral lesion, no sinus tenderness on percussion	  Neck:no JVD, no lymphadenopathy, supple  Respiratory: CTA maryjane  Cardiovascular: S1S2 RRR, no murmurs  Gastrointestinal:soft, (+) BS, no HSM  Extremities:no e/e/c        LABS:                        8.8    12.5  )-----------( 263      ( 21 Apr 2019 06:44 )             26.3     04-22    131<L>  |  90<L>  |  34<H>  ----------------------------<  126<H>  4.1   |  28  |  <0.30<L>    Ca    8.5      22 Apr 2019 05:22              MICROBIOLOGY:    RECENT CULTURES:  04-19 @ 01:39 .Blood                No growth to date.    04-19 @ 01:36 .Urine                >=3 organisms. Probable collection contamination.          RESPIRATORY CULTURES:      Clostridium difficile [facility] Toxins A&amp;B, EIA:   Negative (04-21 @ 18:33)          RADIOLOGY & ADDITIONAL STUDIES:        Pager [phone]  After 5 pm/weekends or if no response :[phone]

## 2019-04-22 NOTE — CHART NOTE - NSCHARTNOTEFT_GEN_A_CORE
CC: Fever    Event Summary:  Notified by RN for fever, axillary T101.3. Patient seen and examined at bedside, in NAD. Patient is nonverbal, unable to obtain ROS. As per RN, patient still with diarrhea.    Vital Signs Last 24 Hrs  T(C): 38.5 (22 Apr 2019 04:32), Max: 38.7 (21 Apr 2019 17:44)  T(F): 101.3 (22 Apr 2019 04:32), Max: 101.7 (21 Apr 2019 17:44)  HR: 116 (22 Apr 2019 04:32) (101 - 121)  BP: 108/71 (22 Apr 2019 04:32) (108/71 - 134/82)  BP(mean): --  RR: 19 (22 Apr 2019 04:32) (18 - 19)  SpO2: 100% (22 Apr 2019 04:32) (100% - 100%)    Physical Exam  General: contracted, thin, chronically ill-appearing female.  Card: tachycardic, regular, S1/S2  Pulm: CTA b/l, respirations even and non-labored, NC in place  Abd: soft, nondistended, nontender, PEG in place  Ext: no LE edema      A/P:  68 yr old with advanced dementia , bed bound, contracted, cva, sp removal of infected hip due to mrsa with placement of space last fall. Pt has been havening fevers for months.     # Recurrent fever, likely 2/2 hip MRSA  - Tylenol and cooling measures prn for pyrexia.  - Repeat BCx/UCx ordered.  - CXR (4/19) with trace b/l pleural effusions.   - Cdiff (4/21) neg.   - Continue with IV vanco  and meropenem.  - ID following, appreciate recs.   Will endorse to primary team in AM.      Sasha Blakely PA-C  Department of Medicine  #[phone]

## 2019-04-22 NOTE — PROGRESS NOTE ADULT - SUBJECTIVE AND OBJECTIVE BOX
---___---___---___---___---___---___ ---___---___---___---___---___---___---___---___---___---                    <<<  M E D I C A L   A T T E N D I N G    F O L L O W    U P   N O T E  >>>    patient spiked a fever again at 4:30 am of 101.3 now given tyleonol      ---___---___---___---___---___---      <<<  MEDICATIONS:  >>>    MEDICATIONS  (STANDING):  ALBUTerol    90 MICROgram(s) HFA Inhaler 1 Puff(s) Inhalation every 4 hours  ALBUTerol/ipratropium for Nebulization 3 milliLiter(s) Nebulizer every 6 hours  apixaban 5 milliGRAM(s) Oral every 12 hours  artificial tears (preservative free) Ophthalmic Solution 1 Drop(s) Both EYES three times a day  ascorbic acid 500 milliGRAM(s) Oral daily  buDESOnide   0.5 milliGRAM(s) Respule 0.5 milliGRAM(s) Inhalation every 12 hours  clonazePAM Tablet 1.5 milliGRAM(s) Oral <User Schedule>  dextrose 5%. 1000 milliLiter(s) (50 mL/Hr) IV Continuous <Continuous>  dextrose 50% Injectable 12.5 Gram(s) IV Push once  dextrose 50% Injectable 25 Gram(s) IV Push once  dextrose 50% Injectable 25 Gram(s) IV Push once  ergocalciferol 32225 Unit(s) Oral every week  famotidine    Tablet 20 milliGRAM(s) Oral daily  ferrous    sulfate Liquid 300 milliGRAM(s) Enteral Tube daily  gabapentin   Solution 300 milliGRAM(s) Oral two times a day  insulin lispro (HumaLOG) corrective regimen sliding scale   SubCutaneous three times a day before meals  insulin lispro (HumaLOG) corrective regimen sliding scale   SubCutaneous at bedtime  loperamide Solution 2 milliGRAM(s) Enteral Tube daily  meropenem  IVPB 1000 milliGRAM(s) IV Intermittent every 12 hours  multivitamin/minerals 1 Tablet(s) Oral daily  petrolatum white Ointment 1 Application(s) Topical three times a day  predniSONE   Tablet   Oral   predniSONE   Tablet 7.5 milliGRAM(s) Oral daily  QUEtiapine 25 milliGRAM(s) Oral at bedtime  sodium chloride 0.9% Bolus 500 milliLiter(s) IV Bolus once  sodium chloride 3%  Inhalation 4 milliLiter(s) Inhalation every 6 hours  tiotropium 18 MICROgram(s) Capsule 1 Capsule(s) Inhalation daily  tiZANidine 4 milliGRAM(s) Oral <User Schedule>  vancomycin  IVPB 1000 milliGRAM(s) IV Intermittent every 24 hours      MEDICATIONS  (PRN):  acetaminophen    Suspension .. 650 milliGRAM(s) Oral every 6 hours PRN Temp greater or equal to 38C (100.4F), Mild Pain (1 - 3), Moderate Pain (4 - 6)  dextrose 40% Gel 15 Gram(s) Oral once PRN Blood Glucose LESS THAN 70 milliGRAM(s)/deciLiter  diphenhydrAMINE   Elixir 25 milliGRAM(s) Oral every 6 hours PRN Rash and/or Itching  glucagon  Injectable 1 milliGRAM(s) IntraMuscular once PRN Glucose <70 milliGRAM(s)/deciLiter  nystatin Powder 1 Application(s) Topical two times a day PRN rash/itchiness  sodium chloride 0.65% Nasal 1 Spray(s) Both Nostrils three times a day PRN Nasal Congestion       ---___---___---___---___---___---     <<<REVIEW OF SYSTEM: >>>  unable to obtain      ---___---___---___---___---___---          <<<  VITAL SIGNS: >>>    T(F): 100 (04-22-19 @ 06:09), Max: 101.7 (04-21-19 @ 17:44)  HR: 116 (04-22-19 @ 04:32) (101 - 121)  BP: 108/71 (04-22-19 @ 04:32) (108/71 - 134/82)  RR: 19 (04-22-19 @ 04:32) (18 - 19)  SpO2: 100% (04-22-19 @ 04:32) (100% - 100%)  Wt(kg): --  CAPILLARY BLOOD GLUCOSE      POCT Blood Glucose.: 149 mg/dL (21 Apr 2019 21:28)    I&O's Summary    20 Apr 2019 07:01  -  21 Apr 2019 07:00  --------------------------------------------------------  IN: 366 mL / OUT: 1200 mL / NET: -834 mL    21 Apr 2019 07:01  -  22 Apr 2019 06:33  --------------------------------------------------------  IN: 813 mL / OUT: 3600 mL / NET: -2787 mL         ---___---___---___---___---___---                       PHYSICAL EXAM:    GEN: thin female laying in bed in mild distress axox0  HEENT: NCAT, PERRL, MMM, no scleral icterus, hearing intact  NECK: Supple, No JVD  CVS: S1S2 , regular , No M/R/G appreciated  PULM: CTA B/L,  no W/R/R appreciated  ABD.: soft. non tender, non distended,  bowel sounds present (+) peg noted   Extrem: intact pulses , no edema noted left leg in brace   Derm: stage 4 sacral decubitus  PSYCH: normal mood, no depression,  anxious     ---___---___---___---___---___---     <<<  LAB AND IMAGING: >>>                          8.8    12.5  )-----------( 263      ( 21 Apr 2019 06:44 )             26.3               04-22    131<L>  |  90<L>  |  34<H>  ----------------------------<  126<H>  4.1   |  28  |  <0.30<L>    Ca    8.5      22 Apr 2019 05:22                                 [All pertinent / recent available Imaging reports and other labs reviewed]     ---___---___---___---___---___---___ ---___---___---___---___---           <<<  A S S E S S M E N T   A N D   P L A N :  >>>  patient with chronic mrsa infection of the right continue vancomycin as per Dr. Brand   asthma on budesonide  stage 4 sacral decubitus continue wound vac and dressing   fractured left femur continue supportive care and medical marijuana  hyperglycemia secondary to medication on RISS  agitation and dementia on seroquel   anemia on ferrous sulfate   chronic pain on gabapentin  and medical marijuana  -GI/DVT Prophylaxis. on eliquis  chf  restart very low dose at 2.5 mg po daily    --------------------------------------------  Case discussed with Mr. roque the   Education given on       I will be away starting today. Dr Hines to cover calls today till 5pm today . Thereafter Dr. Cody Carcamo [phone] to cover me from 5 pm today till 4/30 for all current admission and new admission   >>______________________<<      Deniz Bolden .         phone   [phone]

## 2019-04-23 DIAGNOSIS — K51.90 ULCERATIVE COLITIS, UNSPECIFIED, WITHOUT COMPLICATIONS: ICD-10-CM

## 2019-04-23 LAB
ANION GAP SERPL CALC-SCNC: 12 MMOL/L — SIGNIFICANT CHANGE UP (ref 5–17)
APPEARANCE UR: CLEAR — SIGNIFICANT CHANGE UP
BILIRUB UR-MCNC: NEGATIVE — SIGNIFICANT CHANGE UP
BUN SERPL-MCNC: 34 MG/DL — HIGH (ref 7–23)
CALCIUM SERPL-MCNC: 7.9 MG/DL — LOW (ref 8.4–10.5)
CHLORIDE SERPL-SCNC: 91 MMOL/L — LOW (ref 96–108)
CO2 SERPL-SCNC: 28 MMOL/L — SIGNIFICANT CHANGE UP (ref 22–31)
COLOR SPEC: YELLOW — SIGNIFICANT CHANGE UP
CREAT SERPL-MCNC: <0.3 MG/DL — LOW (ref 0.5–1.3)
DIFF PNL FLD: NEGATIVE — SIGNIFICANT CHANGE UP
GLUCOSE BLDC GLUCOMTR-MCNC: 152 MG/DL — HIGH (ref 70–99)
GLUCOSE BLDC GLUCOMTR-MCNC: 177 MG/DL — HIGH (ref 70–99)
GLUCOSE BLDC GLUCOMTR-MCNC: 245 MG/DL — HIGH (ref 70–99)
GLUCOSE BLDC GLUCOMTR-MCNC: 274 MG/DL — HIGH (ref 70–99)
GLUCOSE SERPL-MCNC: 155 MG/DL — HIGH (ref 70–99)
GLUCOSE UR QL: NEGATIVE — SIGNIFICANT CHANGE UP
HCT VFR BLD CALC: 24.5 % — LOW (ref 34.5–45)
HGB BLD-MCNC: 8.1 G/DL — LOW (ref 11.5–15.5)
KETONES UR-MCNC: NEGATIVE — SIGNIFICANT CHANGE UP
LEUKOCYTE ESTERASE UR-ACNC: ABNORMAL
MCHC RBC-ENTMCNC: 31 PG — SIGNIFICANT CHANGE UP (ref 27–34)
MCHC RBC-ENTMCNC: 33 GM/DL — SIGNIFICANT CHANGE UP (ref 32–36)
MCV RBC AUTO: 94 FL — SIGNIFICANT CHANGE UP (ref 80–100)
NITRITE UR-MCNC: NEGATIVE — SIGNIFICANT CHANGE UP
PH UR: 6.5 — SIGNIFICANT CHANGE UP (ref 5–8)
PLATELET # BLD AUTO: 293 K/UL — SIGNIFICANT CHANGE UP (ref 150–400)
POTASSIUM SERPL-MCNC: 4 MMOL/L — SIGNIFICANT CHANGE UP (ref 3.5–5.3)
POTASSIUM SERPL-SCNC: 4 MMOL/L — SIGNIFICANT CHANGE UP (ref 3.5–5.3)
PROT UR-MCNC: ABNORMAL
RBC # BLD: 2.61 M/UL — LOW (ref 3.8–5.2)
RBC # FLD: 16.8 % — HIGH (ref 10.3–14.5)
SODIUM SERPL-SCNC: 131 MMOL/L — LOW (ref 135–145)
SP GR SPEC: 1.02 — SIGNIFICANT CHANGE UP (ref 1.01–1.02)
UROBILINOGEN FLD QL: NEGATIVE — SIGNIFICANT CHANGE UP
WBC # BLD: 12.1 K/UL — HIGH (ref 3.8–10.5)
WBC # FLD AUTO: 12.1 K/UL — HIGH (ref 3.8–10.5)

## 2019-04-23 PROCEDURE — 51702 INSERT TEMP BLADDER CATH: CPT

## 2019-04-23 PROCEDURE — 73562 X-RAY EXAM OF KNEE 3: CPT | Mod: 26,LT

## 2019-04-23 RX ADMIN — Medication 1 TABLET(S): at 18:33

## 2019-04-23 RX ADMIN — Medication 2: at 14:21

## 2019-04-23 RX ADMIN — Medication 3 MILLILITER(S): at 06:08

## 2019-04-23 RX ADMIN — Medication 2 MILLIGRAM(S): at 14:30

## 2019-04-23 RX ADMIN — SODIUM CHLORIDE 4 MILLILITER(S): 9 INJECTION INTRAMUSCULAR; INTRAVENOUS; SUBCUTANEOUS at 18:34

## 2019-04-23 RX ADMIN — Medication 1.5 MILLIGRAM(S): at 21:41

## 2019-04-23 RX ADMIN — GABAPENTIN 300 MILLIGRAM(S): 400 CAPSULE ORAL at 21:41

## 2019-04-23 RX ADMIN — APIXABAN 5 MILLIGRAM(S): 2.5 TABLET, FILM COATED ORAL at 06:08

## 2019-04-23 RX ADMIN — Medication 300 MILLIGRAM(S): at 14:26

## 2019-04-23 RX ADMIN — Medication 3 MILLILITER(S): at 23:22

## 2019-04-23 RX ADMIN — Medication 1 APPLICATION(S): at 14:31

## 2019-04-23 RX ADMIN — APIXABAN 5 MILLIGRAM(S): 2.5 TABLET, FILM COATED ORAL at 18:33

## 2019-04-23 RX ADMIN — Medication 3: at 09:49

## 2019-04-23 RX ADMIN — Medication 1 DROP(S): at 06:08

## 2019-04-23 RX ADMIN — TIZANIDINE 4 MILLIGRAM(S): 4 TABLET ORAL at 21:42

## 2019-04-23 RX ADMIN — Medication 3 MILLILITER(S): at 11:43

## 2019-04-23 RX ADMIN — SODIUM CHLORIDE 4 MILLILITER(S): 9 INJECTION INTRAMUSCULAR; INTRAVENOUS; SUBCUTANEOUS at 23:22

## 2019-04-23 RX ADMIN — SODIUM CHLORIDE 4 MILLILITER(S): 9 INJECTION INTRAMUSCULAR; INTRAVENOUS; SUBCUTANEOUS at 06:07

## 2019-04-23 RX ADMIN — Medication 0.5 MILLIGRAM(S): at 18:34

## 2019-04-23 RX ADMIN — Medication 25 MILLIGRAM(S): at 09:43

## 2019-04-23 RX ADMIN — FAMOTIDINE 20 MILLIGRAM(S): 10 INJECTION INTRAVENOUS at 14:26

## 2019-04-23 RX ADMIN — GABAPENTIN 300 MILLIGRAM(S): 400 CAPSULE ORAL at 09:43

## 2019-04-23 RX ADMIN — QUETIAPINE FUMARATE 25 MILLIGRAM(S): 200 TABLET, FILM COATED ORAL at 21:42

## 2019-04-23 RX ADMIN — Medication 1 APPLICATION(S): at 06:09

## 2019-04-23 RX ADMIN — Medication 3 MILLILITER(S): at 18:33

## 2019-04-23 RX ADMIN — Medication 250 MILLIGRAM(S): at 11:44

## 2019-04-23 RX ADMIN — TIZANIDINE 4 MILLIGRAM(S): 4 TABLET ORAL at 09:47

## 2019-04-23 RX ADMIN — Medication 500 MILLIGRAM(S): at 14:27

## 2019-04-23 RX ADMIN — Medication 7.5 MILLIGRAM(S): at 06:08

## 2019-04-23 RX ADMIN — Medication 0.5 MILLIGRAM(S): at 06:08

## 2019-04-23 RX ADMIN — Medication 1 APPLICATION(S): at 21:42

## 2019-04-23 RX ADMIN — Medication 1: at 18:35

## 2019-04-23 RX ADMIN — SODIUM CHLORIDE 4 MILLILITER(S): 9 INJECTION INTRAMUSCULAR; INTRAVENOUS; SUBCUTANEOUS at 12:15

## 2019-04-23 RX ADMIN — Medication 1 DROP(S): at 21:41

## 2019-04-23 RX ADMIN — Medication 1 DROP(S): at 14:25

## 2019-04-23 NOTE — CHART NOTE - NSCHARTNOTEFT_GEN_A_CORE
Patient seen for adjustment of knee orthosis. Femoral liner, proximal straps replaced and general clean up or orthosis done. Fresh fracture sock applied and orthosis reapplied. Reviewed positioning with family and staff. To notify office with any issues questions or concerns.  Kulwant BOB  [facility]  [phone]

## 2019-04-23 NOTE — PROGRESS NOTE ADULT - SUBJECTIVE AND OBJECTIVE BOX
SUBJECTIVE / OVERNIGHT EVENTS: pt seen nda examined pts  next to pts bed      MEDICATIONS  (STANDING):  ALBUTerol    90 MICROgram(s) HFA Inhaler 1 Puff(s) Inhalation every 4 hours  ALBUTerol/ipratropium for Nebulization 3 milliLiter(s) Nebulizer every 6 hours  apixaban 5 milliGRAM(s) Oral every 12 hours  artificial tears (preservative free) Ophthalmic Solution 1 Drop(s) Both EYES three times a day  ascorbic acid 500 milliGRAM(s) Oral daily  buDESOnide   0.5 milliGRAM(s) Respule 0.5 milliGRAM(s) Inhalation every 12 hours  clonazePAM Tablet 1.5 milliGRAM(s) Oral <User Schedule>  dextrose 5%. 1000 milliLiter(s) (50 mL/Hr) IV Continuous <Continuous>  dextrose 50% Injectable 12.5 Gram(s) IV Push once  dextrose 50% Injectable 25 Gram(s) IV Push once  dextrose 50% Injectable 25 Gram(s) IV Push once  ergocalciferol 51817 Unit(s) Oral every week  famotidine    Tablet 20 milliGRAM(s) Oral daily  ferrous    sulfate Liquid 300 milliGRAM(s) Enteral Tube daily  gabapentin   Solution 300 milliGRAM(s) Oral two times a day  insulin lispro (HumaLOG) corrective regimen sliding scale   SubCutaneous three times a day before meals  insulin lispro (HumaLOG) corrective regimen sliding scale   SubCutaneous at bedtime  loperamide Solution 2 milliGRAM(s) Enteral Tube daily  Medical Marijuana Oil 1 milliLiter(s),Medical Marijuana Oil 1 milliLiter(s) 1 milliLiter(s) Oral <User Schedule>  multivitamin/minerals 1 Tablet(s) Oral daily  petrolatum white Ointment 1 Application(s) Topical three times a day  predniSONE   Tablet   Oral   predniSONE   Tablet 7.5 milliGRAM(s) Oral daily  QUEtiapine 25 milliGRAM(s) Oral at bedtime  sodium chloride 0.9% Bolus 500 milliLiter(s) IV Bolus once  sodium chloride 3%  Inhalation 4 milliLiter(s) Inhalation every 6 hours  tiotropium 18 MICROgram(s) Capsule 1 Capsule(s) Inhalation daily  tiZANidine 4 milliGRAM(s) Oral <User Schedule>  vancomycin  IVPB 1000 milliGRAM(s) IV Intermittent every 24 hours    MEDICATIONS  (PRN):  acetaminophen    Suspension .. 650 milliGRAM(s) Oral every 6 hours PRN Temp greater or equal to 38C (100.4F), Mild Pain (1 - 3), Moderate Pain (4 - 6)  dextrose 40% Gel 15 Gram(s) Oral once PRN Blood Glucose LESS THAN 70 milliGRAM(s)/deciLiter  diphenhydrAMINE   Elixir 25 milliGRAM(s) Oral every 6 hours PRN Rash and/or Itching  glucagon  Injectable 1 milliGRAM(s) IntraMuscular once PRN Glucose <70 milliGRAM(s)/deciLiter  nystatin Powder 1 Application(s) Topical two times a day PRN rash/itchiness  sodium chloride 0.65% Nasal 1 Spray(s) Both Nostrils three times a day PRN Nasal Congestion    Vital Signs Last 24 Hrs  T(C): 37.7 (2019 13:26), Max: 37.7 (2019 13:26)  T(F): 99.8 (2019 13:26), Max: 99.8 (2019 13:26)  HR: 102 (:) (102 - 118)  BP: 100/56 (2019 13:26) (100/56 - 119/70)  BP(mean): --  RR: 18 (2019 13:26) (18 - 19)  SpO2: 100% (2019 13:26) (84% - 100%)    CAPILLARY BLOOD GLUCOSE      POCT Blood Glucose.: 177 mg/dL (2019 18:02)  POCT Blood Glucose.: 245 mg/dL (2019 13:54)  POCT Blood Glucose.: 274 mg/dL (2019 09:12)  POCT Blood Glucose.: 210 mg/dL (2019 22:03)    I&O's Summary    2019 07:  -  2019 07:00  --------------------------------------------------------  IN: 1420 mL / OUT: 2100 mL / NET: -680 mL    2019 07:  -  2019 20:58  --------------------------------------------------------  IN: 1200 mL / OUT: 200 mL / NET: 1000 mL      unable to obtain ros from pt sec to pts cognitive status    PHYSICAL EXAM:  GENERAL: NAD  EYES: EOMI, PERRLA  NECK: Supple, No JVD  CHEST/LUNG: dec breath sounds at bases , no wheezing   HEART:  S1 , S2 +  ABDOMEN: soft , bs+  EXTREMITIES:  no edema      LABS:                        8.1    12.1  )-----------( 293      ( 2019 07:02 )             24.5     04-23    131<L>  |  91<L>  |  34<H>  ----------------------------<  155<H>  4.0   |  28  |  <0.30<L>    Ca    7.9<L>      2019 07:02            Urinalysis Basic - ( 2019 07:01 )    Color: Yellow / Appearance: Clear / S.018 / pH: x  Gluc: x / Ketone: Negative  / Bili: Negative / Urobili: Negative   Blood: x / Protein: 30 mg/dL / Nitrite: Negative   Leuk Esterase: Moderate / RBC: 1 /hpf / WBC 27 /HPF   Sq Epi: x / Non Sq Epi: 3 /hpf / Bacteria: Negative        RADIOLOGY & ADDITIONAL TESTS:    Imaging Personally Reviewed:    Consultant(s) Notes Reviewed:      Care Discussed with Consultants/Other Providers:

## 2019-04-23 NOTE — PROCEDURE NOTE - NSINFORMCONSENT_GEN_A_CORE
Benefits, risks, and possible complications of procedure explained to patient/caregiver who verbalized understanding and gave verbal consent.
per son/Benefits, risks, and possible complications of procedure explained to patient/caregiver who verbalized understanding and gave verbal consent.

## 2019-04-23 NOTE — PROCEDURE NOTE - ADDITIONAL PROCEDURE DETAILS
16f gavin placed using sterile technique. Difficult gavin due to patient being very contracted.     purulent urine drained.

## 2019-04-23 NOTE — CHART NOTE - NSCHARTNOTEFT_GEN_A_CORE
Patient will require home oxygen.  Measured oxygen saturation on room air this morning was 84% at rest.  With subsequent application of 3 liters O2 via NC, oxygen saturation improved to 93%.  Of note patient has a complicated medical history, including advanced dementia (nonverbal, dysphagia with PEG tube, functional quadriplegic), asthma on chronic prednisone, CVA, HFrEF, and elevated pulmonary pressure.  Will request that home O2 be facilitated by CM.    Sherin Escobedo NP  [phone]

## 2019-04-23 NOTE — PROCEDURE NOTE - NSPROCDETAILS_GEN_ALL_CORE
sterile technique, old cysto removed, new tube inserted
sterile technique, indwelling urinary device inserted/a urinary catheter insertion kit was used for all insertion materials/prior to placement, an active physician order for the placement of a urinary catheter was verified

## 2019-04-23 NOTE — CHART NOTE - NSCHARTNOTEFT_GEN_A_CORE
Patient seen and examined. Pain currently well controlled. No significant L leg swelling at this time.    Vital Signs Last 24 Hrs  T(C): 36.9 (23 Apr 2019 21:01), Max: 37.7 (23 Apr 2019 13:26)  T(F): 98.5 (23 Apr 2019 21:01), Max: 99.8 (23 Apr 2019 13:26)  HR: 108 (23 Apr 2019 21:01) (102 - 118)  BP: 119/66 (23 Apr 2019 21:01) (100/56 - 119/66)  BP(mean): --  RR: 18 (23 Apr 2019 21:01) (18 - 18)  SpO2: 93% (23 Apr 2019 21:01) (84% - 100%)    Exam:  Gen: NAD, skin intact  Motor: Wiggles toes  Sensory: SILT DP/SP/S/S/T nerve distributions  Vascular: 2+ Dorsalis Pedis pulse    A/P: 68 year old female with L distal femur fracture  - Pain Control  - Fracture brace  - NWB LLE  - No acute orthopedic intervention at this time

## 2019-04-23 NOTE — PROCEDURE NOTE - NSURITECHNIQUE_GU_A_CORE
Sterile gloves were worn for the duration of the procedure/A sterile drape was used to cover all adjacent areas/The catheter was appropriately lubricated/The urinary drainage system is closed at the end of the procedure/The collection bag is below the level of the patient and urinary bladder/Proper hand hygiene was performed/The site was cleaned with soap/water and sterile solution (betadine)/The catheter was secured with a securement device (e.g. StatLock)/All applicable medical record documentation is completed
The site was cleaned with soap/water and sterile solution (betadine)/The urinary drainage system is closed at the end of the procedure/Proper hand hygiene was performed/Sterile gloves were worn for the duration of the procedure/A sterile drape was used to cover all adjacent areas/The catheter was appropriately lubricated/The catheter was secured with a securement device (e.g. StatLock)/The collection bag is below the level of the patient and urinary bladder

## 2019-04-24 LAB
ANION GAP SERPL CALC-SCNC: 11 MMOL/L — SIGNIFICANT CHANGE UP (ref 5–17)
BLD GP AB SCN SERPL QL: NEGATIVE — SIGNIFICANT CHANGE UP
BUN SERPL-MCNC: 32 MG/DL — HIGH (ref 7–23)
CALCIUM SERPL-MCNC: 8.1 MG/DL — LOW (ref 8.4–10.5)
CHLORIDE SERPL-SCNC: 90 MMOL/L — LOW (ref 96–108)
CO2 SERPL-SCNC: 29 MMOL/L — SIGNIFICANT CHANGE UP (ref 22–31)
CREAT SERPL-MCNC: <0.3 MG/DL — LOW (ref 0.5–1.3)
CULTURE RESULTS: SIGNIFICANT CHANGE UP
GLUCOSE BLDC GLUCOMTR-MCNC: 125 MG/DL — HIGH (ref 70–99)
GLUCOSE BLDC GLUCOMTR-MCNC: 146 MG/DL — HIGH (ref 70–99)
GLUCOSE BLDC GLUCOMTR-MCNC: 199 MG/DL — HIGH (ref 70–99)
GLUCOSE BLDC GLUCOMTR-MCNC: 258 MG/DL — HIGH (ref 70–99)
GLUCOSE SERPL-MCNC: 153 MG/DL — HIGH (ref 70–99)
HCT VFR BLD CALC: 22.1 % — LOW (ref 34.5–45)
HCT VFR BLD CALC: 28.5 % — LOW (ref 34.5–45)
HGB BLD-MCNC: 7.6 G/DL — LOW (ref 11.5–15.5)
HGB BLD-MCNC: 9.4 G/DL — LOW (ref 11.5–15.5)
MCHC RBC-ENTMCNC: 30.9 PG — SIGNIFICANT CHANGE UP (ref 27–34)
MCHC RBC-ENTMCNC: 32.2 PG — SIGNIFICANT CHANGE UP (ref 27–34)
MCHC RBC-ENTMCNC: 33 GM/DL — SIGNIFICANT CHANGE UP (ref 32–36)
MCHC RBC-ENTMCNC: 34.4 GM/DL — SIGNIFICANT CHANGE UP (ref 32–36)
MCV RBC AUTO: 93.5 FL — SIGNIFICANT CHANGE UP (ref 80–100)
MCV RBC AUTO: 93.8 FL — SIGNIFICANT CHANGE UP (ref 80–100)
PLATELET # BLD AUTO: 268 K/UL — SIGNIFICANT CHANGE UP (ref 150–400)
PLATELET # BLD AUTO: 312 K/UL — SIGNIFICANT CHANGE UP (ref 150–400)
POTASSIUM SERPL-MCNC: 3.8 MMOL/L — SIGNIFICANT CHANGE UP (ref 3.5–5.3)
POTASSIUM SERPL-SCNC: 3.8 MMOL/L — SIGNIFICANT CHANGE UP (ref 3.5–5.3)
RBC # BLD: 2.36 M/UL — LOW (ref 3.8–5.2)
RBC # BLD: 3.04 M/UL — LOW (ref 3.8–5.2)
RBC # FLD: 15.4 % — HIGH (ref 10.3–14.5)
RBC # FLD: 16.4 % — HIGH (ref 10.3–14.5)
RH IG SCN BLD-IMP: NEGATIVE — SIGNIFICANT CHANGE UP
SODIUM SERPL-SCNC: 130 MMOL/L — LOW (ref 135–145)
SPECIMEN SOURCE: SIGNIFICANT CHANGE UP
WBC # BLD: 12.4 K/UL — HIGH (ref 3.8–10.5)
WBC # BLD: 9.9 K/UL — SIGNIFICANT CHANGE UP (ref 3.8–10.5)
WBC # FLD AUTO: 12.4 K/UL — HIGH (ref 3.8–10.5)
WBC # FLD AUTO: 9.9 K/UL — SIGNIFICANT CHANGE UP (ref 3.8–10.5)

## 2019-04-24 PROCEDURE — 99232 SBSQ HOSP IP/OBS MODERATE 35: CPT

## 2019-04-24 RX ORDER — ACETAMINOPHEN 500 MG
975 TABLET ORAL ONCE
Qty: 0 | Refills: 0 | Status: COMPLETED | OUTPATIENT
Start: 2019-04-24 | End: 2019-04-24

## 2019-04-24 RX ORDER — CHLORHEXIDINE GLUCONATE 213 G/1000ML
1 SOLUTION TOPICAL
Qty: 0 | Refills: 0 | Status: DISCONTINUED | OUTPATIENT
Start: 2019-04-24 | End: 2019-05-06

## 2019-04-24 RX ORDER — CHLORHEXIDINE GLUCONATE 213 G/1000ML
1 SOLUTION TOPICAL ONCE
Qty: 0 | Refills: 0 | Status: COMPLETED | OUTPATIENT
Start: 2019-04-24 | End: 2019-04-24

## 2019-04-24 RX ADMIN — Medication 250 MILLIGRAM(S): at 10:38

## 2019-04-24 RX ADMIN — Medication 3 MILLILITER(S): at 12:34

## 2019-04-24 RX ADMIN — Medication 1 APPLICATION(S): at 06:14

## 2019-04-24 RX ADMIN — Medication 25 MILLIGRAM(S): at 09:15

## 2019-04-24 RX ADMIN — SODIUM CHLORIDE 4 MILLILITER(S): 9 INJECTION INTRAMUSCULAR; INTRAVENOUS; SUBCUTANEOUS at 06:10

## 2019-04-24 RX ADMIN — Medication 7.5 MILLIGRAM(S): at 06:10

## 2019-04-24 RX ADMIN — Medication 975 MILLIGRAM(S): at 13:06

## 2019-04-24 RX ADMIN — Medication 0.5 MILLIGRAM(S): at 17:46

## 2019-04-24 RX ADMIN — Medication 1 APPLICATION(S): at 22:22

## 2019-04-24 RX ADMIN — Medication 3: at 13:45

## 2019-04-24 RX ADMIN — Medication 3 MILLILITER(S): at 17:46

## 2019-04-24 RX ADMIN — TIZANIDINE 4 MILLIGRAM(S): 4 TABLET ORAL at 09:37

## 2019-04-24 RX ADMIN — SODIUM CHLORIDE 4 MILLILITER(S): 9 INJECTION INTRAMUSCULAR; INTRAVENOUS; SUBCUTANEOUS at 17:46

## 2019-04-24 RX ADMIN — Medication 1 DROP(S): at 06:11

## 2019-04-24 RX ADMIN — APIXABAN 5 MILLIGRAM(S): 2.5 TABLET, FILM COATED ORAL at 06:14

## 2019-04-24 RX ADMIN — GABAPENTIN 300 MILLIGRAM(S): 400 CAPSULE ORAL at 09:37

## 2019-04-24 RX ADMIN — QUETIAPINE FUMARATE 25 MILLIGRAM(S): 200 TABLET, FILM COATED ORAL at 22:08

## 2019-04-24 RX ADMIN — Medication 1 DROP(S): at 13:05

## 2019-04-24 RX ADMIN — Medication 975 MILLIGRAM(S): at 12:35

## 2019-04-24 RX ADMIN — Medication 500 MILLIGRAM(S): at 09:15

## 2019-04-24 RX ADMIN — TIZANIDINE 4 MILLIGRAM(S): 4 TABLET ORAL at 22:08

## 2019-04-24 RX ADMIN — Medication 1.5 MILLIGRAM(S): at 22:08

## 2019-04-24 RX ADMIN — GABAPENTIN 300 MILLIGRAM(S): 400 CAPSULE ORAL at 22:08

## 2019-04-24 RX ADMIN — SODIUM CHLORIDE 4 MILLILITER(S): 9 INJECTION INTRAMUSCULAR; INTRAVENOUS; SUBCUTANEOUS at 12:34

## 2019-04-24 RX ADMIN — Medication 1 DROP(S): at 22:09

## 2019-04-24 RX ADMIN — Medication 1 TABLET(S): at 09:15

## 2019-04-24 RX ADMIN — Medication 3 MILLILITER(S): at 06:10

## 2019-04-24 RX ADMIN — CHLORHEXIDINE GLUCONATE 1 APPLICATION(S): 213 SOLUTION TOPICAL at 12:36

## 2019-04-24 RX ADMIN — Medication 300 MILLIGRAM(S): at 09:15

## 2019-04-24 RX ADMIN — FAMOTIDINE 20 MILLIGRAM(S): 10 INJECTION INTRAVENOUS at 09:15

## 2019-04-24 RX ADMIN — APIXABAN 5 MILLIGRAM(S): 2.5 TABLET, FILM COATED ORAL at 17:46

## 2019-04-24 RX ADMIN — Medication 1 APPLICATION(S): at 13:06

## 2019-04-24 RX ADMIN — Medication 1: at 09:15

## 2019-04-24 RX ADMIN — SODIUM CHLORIDE 250 MILLILITER(S): 9 INJECTION INTRAMUSCULAR; INTRAVENOUS; SUBCUTANEOUS at 11:09

## 2019-04-24 RX ADMIN — Medication 0.5 MILLIGRAM(S): at 06:14

## 2019-04-24 NOTE — CONSULT NOTE ADULT - SUBJECTIVE AND OBJECTIVE BOX
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 History was provided by patient's daughters, Tere and Angie at bedside. Patient has been having persistent fever at home up to 103F. She has appeared more lethargic for the last 2 days and has had labored breathing, but no cough. She has loose nonbloody stools. No recent emesis, melena or hematuria. Urine output via gavin catheter is normal yellow appearance and had "good output." Patient gets regular wound care but has persistent ulcers. Because of persistent fevers, pt was brought to ED.     This patient was previously hospitalized from 2/26-3/18/2019 for fevers, found to have septic shock 2/2 UTI vs sacral ulcer vs PNA, requiring pressor support and MICU stay. 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 Offer was made to change the spacer and get cultures, however at that time, the  did not want to put the patient through surgery. Pt completed a course of meropenem, and was resumed on suppressive minocycline.     In the ED, T 100.8 Rectal  , /63, RR 14, SpO2 98%RA  MEETS SIRS CRITERIA based on HR and temperature.   She was given IVNS 1.5L, acetaminophen 650mg and meropenem 1g. (24 Mar 2019 20:54)      PAST MEDICAL & SURGICAL HISTORY:  CVA (cerebral vascular accident)  Fatty pancreas  PNA (pneumonia)  Pulmonary HTN  IGT (impaired glucose tolerance)  Ulcerative colitis  Acid reflux  Anxiety  Depression  Mouth sores  HLD (hyperlipidemia)  Asthma  Humeral head fracture  H/O: hysterectomy  H/O cataract extraction, left  History of knee replacement      MEDICATIONS  (STANDING):  ALBUTerol    90 MICROgram(s) HFA Inhaler 1 Puff(s) Inhalation every 4 hours  ALBUTerol/ipratropium for Nebulization 3 milliLiter(s) Nebulizer every 6 hours  apixaban 5 milliGRAM(s) Oral every 12 hours  artificial tears (preservative free) Ophthalmic Solution 1 Drop(s) Both EYES three times a day  ascorbic acid 500 milliGRAM(s) Oral daily  buDESOnide   0.5 milliGRAM(s) Respule 0.5 milliGRAM(s) Inhalation every 12 hours  chlorhexidine 4% Liquid 1 Application(s) Topical <User Schedule>  clonazePAM Tablet 1.5 milliGRAM(s) Oral <User Schedule>  dextrose 5%. 1000 milliLiter(s) (50 mL/Hr) IV Continuous <Continuous>  dextrose 50% Injectable 12.5 Gram(s) IV Push once  dextrose 50% Injectable 25 Gram(s) IV Push once  dextrose 50% Injectable 25 Gram(s) IV Push once  ergocalciferol 80622 Unit(s) Oral every week  famotidine    Tablet 20 milliGRAM(s) Oral daily  ferrous    sulfate Liquid 300 milliGRAM(s) Enteral Tube daily  gabapentin   Solution 300 milliGRAM(s) Oral two times a day  insulin lispro (HumaLOG) corrective regimen sliding scale   SubCutaneous three times a day before meals  insulin lispro (HumaLOG) corrective regimen sliding scale   SubCutaneous at bedtime  loperamide Solution 2 milliGRAM(s) Enteral Tube daily  Medical Marijuana Oil 1 milliLiter(s),Medical Marijuana Oil 1 milliLiter(s) 1 milliLiter(s) Oral <User Schedule>  multivitamin/minerals 1 Tablet(s) Oral daily  petrolatum white Ointment 1 Application(s) Topical three times a day  predniSONE   Tablet   Oral   predniSONE   Tablet 7.5 milliGRAM(s) Oral daily  QUEtiapine 25 milliGRAM(s) Oral at bedtime  sodium chloride 3%  Inhalation 4 milliLiter(s) Inhalation every 6 hours  tiotropium 18 MICROgram(s) Capsule 1 Capsule(s) Inhalation daily  tiZANidine 4 milliGRAM(s) Oral <User Schedule>  vancomycin  IVPB 1000 milliGRAM(s) IV Intermittent every 24 hours      Allergies    ASA; dye contrast (Anaphylaxis)  aspirin (Short breath)  divalproex sodium (Other (Unknown))  Flowers and Plants (Short breath)  Haldol (Other (Unknown))  penicillin (Short breath; Rash)  sulfa drugs (Short breath; Rash)  vancomycin (Rash; Urticaria; Hives)  Xanax (Other (Unknown))    Intolerances        SOCIAL HISTORY:  Denies ETOh,Smoking,     FAMILY HISTORY:  Family history of dementia (Grandparent)  Family history of colon cancer (Grandparent)      REVIEW OF SYSTEMS:    CONSTITUTIONAL: No weakness, fevers or chills  EYES/ENT: No visual changes;  No vertigo or throat pain   NECK: No pain or stiffness  RESPIRATORY: No cough, wheezing, hemoptysis; No shortness of breath  CARDIOVASCULAR: No chest pain or palpitations  GASTROINTESTINAL: No abdominal or epigastric pain. No nausea, vomiting, or hematemesis; No diarrhea or constipation. No melena or hematochezia.  GENITOURINARY: No dysuria, frequency or hematuria  NEUROLOGICAL: No numbness or weakness  SKIN: No itching, burning, rashes, or lesions   All other review of systems is negative unless indicated above.    VITAL:  T(C): , Max: 36.9 (04-23-19 @ 21:01)  T(F): , Max: 98.5 (04-23-19 @ 21:01)  HR: 97 (04-24-19 @ 16:37)  BP: 111/66 (04-24-19 @ 16:37)  BP(mean): --  RR: 18 (04-24-19 @ 16:37)  SpO2: 98% (04-24-19 @ 16:37)  Wt(kg): --    I and O's:    04-22 @ 07:01  -  04-23 @ 07:00  --------------------------------------------------------  IN: 1420 mL / OUT: 2100 mL / NET: -680 mL    04-23 @ 07:01  -  04-24 @ 07:00  --------------------------------------------------------  IN: 1500 mL / OUT: 1550 mL / NET: -50 mL    04-24 @ 07:01  -  04-24 @ 19:25  --------------------------------------------------------  IN: 720 mL / OUT: 600 mL / NET: 120 mL          PHYSICAL EXAM:    Constitutional: NAD  HEENT: PERRLA,   Neck: No JVD  Respiratory: CTA B/L  Cardiovascular: S1 and S2  Gastrointestinal: BS+, soft, NT/ND  Extremities: No peripheral edema  Neurological: A/O x 3, no focal deficits  Psychiatric: Normal mood, normal affect  : No Gavin  Skin: No rashes  Access: Not applicable  Back: No CVA tenderness    LABS:                        7.6    9.9   )-----------( 268      ( 24 Apr 2019 05:08 )             22.1     04-24    130<L>  |  90<L>  |  32<H>  ----------------------------<  153<H>  3.8   |  29  |  <0.30<L>    Ca    8.1<L>      24 Apr 2019 05:08            RADIOLOGY & ADDITIONAL STUDIES:

## 2019-04-24 NOTE — PROGRESS NOTE ADULT - SUBJECTIVE AND OBJECTIVE BOX
SUBJECTIVE / OVERNIGHT EVENTS: pt seen and examined pts  next to pts bed    MEDICATIONS  (STANDING):  ALBUTerol    90 MICROgram(s) HFA Inhaler 1 Puff(s) Inhalation every 4 hours  ALBUTerol/ipratropium for Nebulization 3 milliLiter(s) Nebulizer every 6 hours  apixaban 5 milliGRAM(s) Oral every 12 hours  artificial tears (preservative free) Ophthalmic Solution 1 Drop(s) Both EYES three times a day  ascorbic acid 500 milliGRAM(s) Oral daily  buDESOnide   0.5 milliGRAM(s) Respule 0.5 milliGRAM(s) Inhalation every 12 hours  chlorhexidine 4% Liquid 1 Application(s) Topical <User Schedule>  clonazePAM Tablet 1.5 milliGRAM(s) Oral <User Schedule>  dextrose 5%. 1000 milliLiter(s) (50 mL/Hr) IV Continuous <Continuous>  dextrose 50% Injectable 12.5 Gram(s) IV Push once  dextrose 50% Injectable 25 Gram(s) IV Push once  dextrose 50% Injectable 25 Gram(s) IV Push once  ergocalciferol 40761 Unit(s) Oral every week  famotidine    Tablet 20 milliGRAM(s) Oral daily  ferrous    sulfate Liquid 300 milliGRAM(s) Enteral Tube daily  gabapentin   Solution 300 milliGRAM(s) Oral two times a day  insulin lispro (HumaLOG) corrective regimen sliding scale   SubCutaneous three times a day before meals  insulin lispro (HumaLOG) corrective regimen sliding scale   SubCutaneous at bedtime  loperamide Solution 2 milliGRAM(s) Enteral Tube daily  Medical Marijuana Oil 1 milliLiter(s),Medical Marijuana Oil 1 milliLiter(s) 1 milliLiter(s) Oral <User Schedule>  multivitamin/minerals 1 Tablet(s) Oral daily  petrolatum white Ointment 1 Application(s) Topical three times a day  predniSONE   Tablet   Oral   predniSONE   Tablet 7.5 milliGRAM(s) Oral daily  QUEtiapine 25 milliGRAM(s) Oral at bedtime  sodium chloride 3%  Inhalation 4 milliLiter(s) Inhalation every 6 hours  tiotropium 18 MICROgram(s) Capsule 1 Capsule(s) Inhalation daily  tiZANidine 4 milliGRAM(s) Oral <User Schedule>  vancomycin  IVPB 1000 milliGRAM(s) IV Intermittent every 24 hours    MEDICATIONS  (PRN):  acetaminophen    Suspension .. 650 milliGRAM(s) Oral every 6 hours PRN Temp greater or equal to 38C (100.4F), Mild Pain (1 - 3), Moderate Pain (4 - 6)  dextrose 40% Gel 15 Gram(s) Oral once PRN Blood Glucose LESS THAN 70 milliGRAM(s)/deciLiter  diphenhydrAMINE   Elixir 25 milliGRAM(s) Oral every 6 hours PRN Rash and/or Itching  glucagon  Injectable 1 milliGRAM(s) IntraMuscular once PRN Glucose <70 milliGRAM(s)/deciLiter  nystatin Powder 1 Application(s) Topical two times a day PRN rash/itchiness  sodium chloride 0.65% Nasal 1 Spray(s) Both Nostrils three times a day PRN Nasal Congestion    Vital Signs Last 24 Hrs  T(C): 36.9 (2019 21:05), Max: 36.9 (2019 21:05)  T(F): 98.4 (2019 21:05), Max: 98.4 (2019 21:05)  HR: 101 (2019 21:05) (93 - 102)  BP: 131/71 (2019 21:05) (100/55 - 131/71)  BP(mean): --  RR: 18 (2019 21:05) (18 - 18)  SpO2: 94% (2019 21:05) (93% - 98%)    unable to obtain ros from pt sec to pts cognitive status    PHYSICAL EXAM:  GENERAL: NAD  EYES: EOMI, PERRLA  NECK: Supple, No JVD  CHEST/LUNG: dec breath sounds at bases , no wheezing   HEART:  S1 , S2 +  ABDOMEN: soft , bs+  EXTREMITIES:  no edema    LABS:      130<L>  |  90<L>  |  32<H>  ----------------------------<  153<H>  3.8   |  29  |  <0.30<L>    Ca    8.1<L>      2019 05:08      Creatinine Trend: <0.30 <--, <0.30 <--, <0.30 <--, <0.30 <--, <0.30 <--, 0.32 <--, 0.30 <--, 0.30 <--                        9.4    12.4  )-----------( 312      ( 2019 23:03 )             28.5     Urine Studies:  Urinalysis Basic - ( 2019 07:01 )    Color: Yellow / Appearance: Clear / S.018 / pH:   Gluc:  / Ketone: Negative  / Bili: Negative / Urobili: Negative   Blood:  / Protein: 30 mg/dL / Nitrite: Negative   Leuk Esterase: Moderate / RBC: 1 /hpf / WBC 27 /HPF   Sq Epi:  / Non Sq Epi: 3 /hpf / Bacteria: Negative                        RADIOLOGY & ADDITIONAL TESTS:    Imaging Personally Reviewed:    Consultant(s) Notes Reviewed:      Care Discussed with Consultants/Other Providers:

## 2019-04-24 NOTE — CONSULT NOTE ADULT - ASSESSMENT
pt well known from peg placment.  diarrhea, likely multifactoral antibitoic associated and feeds.  lomoitl appropriate, will use tincutre opium if no improvment.

## 2019-04-25 LAB
ANION GAP SERPL CALC-SCNC: 13 MMOL/L — SIGNIFICANT CHANGE UP (ref 5–17)
APPEARANCE UR: CLEAR — SIGNIFICANT CHANGE UP
BILIRUB UR-MCNC: NEGATIVE — SIGNIFICANT CHANGE UP
BUN SERPL-MCNC: 28 MG/DL — HIGH (ref 7–23)
CALCIUM SERPL-MCNC: 8.6 MG/DL — SIGNIFICANT CHANGE UP (ref 8.4–10.5)
CHLORIDE SERPL-SCNC: 91 MMOL/L — LOW (ref 96–108)
CO2 SERPL-SCNC: 27 MMOL/L — SIGNIFICANT CHANGE UP (ref 22–31)
COLOR SPEC: SIGNIFICANT CHANGE UP
CREAT SERPL-MCNC: <0.3 MG/DL — LOW (ref 0.5–1.3)
DIFF PNL FLD: NEGATIVE — SIGNIFICANT CHANGE UP
GLUCOSE BLDC GLUCOMTR-MCNC: 119 MG/DL — HIGH (ref 70–99)
GLUCOSE BLDC GLUCOMTR-MCNC: 152 MG/DL — HIGH (ref 70–99)
GLUCOSE BLDC GLUCOMTR-MCNC: 218 MG/DL — HIGH (ref 70–99)
GLUCOSE BLDC GLUCOMTR-MCNC: 282 MG/DL — HIGH (ref 70–99)
GLUCOSE SERPL-MCNC: 152 MG/DL — HIGH (ref 70–99)
GLUCOSE UR QL: NEGATIVE — SIGNIFICANT CHANGE UP
GRAM STN FLD: SIGNIFICANT CHANGE UP
HCT VFR BLD CALC: 30.4 % — LOW (ref 34.5–45)
HGB BLD-MCNC: 9.8 G/DL — LOW (ref 11.5–15.5)
KETONES UR-MCNC: NEGATIVE — SIGNIFICANT CHANGE UP
LEUKOCYTE ESTERASE UR-ACNC: ABNORMAL
MCHC RBC-ENTMCNC: 30.2 PG — SIGNIFICANT CHANGE UP (ref 27–34)
MCHC RBC-ENTMCNC: 32.3 GM/DL — SIGNIFICANT CHANGE UP (ref 32–36)
MCV RBC AUTO: 93.4 FL — SIGNIFICANT CHANGE UP (ref 80–100)
NITRITE UR-MCNC: NEGATIVE — SIGNIFICANT CHANGE UP
PH UR: 7.5 — SIGNIFICANT CHANGE UP (ref 5–8)
PLATELET # BLD AUTO: 342 K/UL — SIGNIFICANT CHANGE UP (ref 150–400)
POTASSIUM SERPL-MCNC: 4.1 MMOL/L — SIGNIFICANT CHANGE UP (ref 3.5–5.3)
POTASSIUM SERPL-SCNC: 4.1 MMOL/L — SIGNIFICANT CHANGE UP (ref 3.5–5.3)
PROT UR-MCNC: ABNORMAL
RBC # BLD: 3.25 M/UL — LOW (ref 3.8–5.2)
RBC # FLD: 15.6 % — HIGH (ref 10.3–14.5)
SODIUM SERPL-SCNC: 131 MMOL/L — LOW (ref 135–145)
SP GR SPEC: 1.02 — SIGNIFICANT CHANGE UP (ref 1.01–1.02)
SPECIMEN SOURCE: SIGNIFICANT CHANGE UP
UROBILINOGEN FLD QL: NEGATIVE — SIGNIFICANT CHANGE UP
WBC # BLD: 12.4 K/UL — HIGH (ref 3.8–10.5)
WBC # FLD AUTO: 12.4 K/UL — HIGH (ref 3.8–10.5)

## 2019-04-25 PROCEDURE — 71045 X-RAY EXAM CHEST 1 VIEW: CPT | Mod: 26

## 2019-04-25 PROCEDURE — 99232 SBSQ HOSP IP/OBS MODERATE 35: CPT

## 2019-04-25 RX ORDER — IPRATROPIUM/ALBUTEROL SULFATE 18-103MCG
3 AEROSOL WITH ADAPTER (GRAM) INHALATION
Qty: 0 | Refills: 0 | DISCHARGE
Start: 2019-04-25

## 2019-04-25 RX ORDER — APIXABAN 2.5 MG/1
1 TABLET, FILM COATED ORAL
Qty: 0 | Refills: 0 | DISCHARGE
Start: 2019-04-25

## 2019-04-25 RX ORDER — DIPHENOXYLATE HCL/ATROPINE 2.5-.025MG
1 TABLET ORAL
Qty: 0 | Refills: 0 | Status: DISCONTINUED | OUTPATIENT
Start: 2019-04-25 | End: 2019-04-26

## 2019-04-25 RX ORDER — TIOTROPIUM BROMIDE 18 UG/1
1 CAPSULE ORAL; RESPIRATORY (INHALATION)
Qty: 1 | Refills: 0
Start: 2019-04-25 | End: 2019-05-24

## 2019-04-25 RX ORDER — SODIUM CHLORIDE 0.65 %
0 AEROSOL, SPRAY (ML) NASAL
Qty: 0 | Refills: 0 | DISCHARGE
Start: 2019-04-25

## 2019-04-25 RX ORDER — DIPHENOXYLATE HCL/ATROPINE 2.5-.025MG
10 TABLET ORAL
Qty: 100 | Refills: 0 | OUTPATIENT
Start: 2019-04-25 | End: 2019-05-08

## 2019-04-25 RX ORDER — PETROLATUM,WHITE
1 JELLY (GRAM) TOPICAL
Qty: 0 | Refills: 0 | DISCHARGE
Start: 2019-04-25

## 2019-04-25 RX ORDER — ASCORBIC ACID 60 MG
1 TABLET,CHEWABLE ORAL
Qty: 0 | Refills: 0 | DISCHARGE
Start: 2019-04-25

## 2019-04-25 RX ORDER — BUDESONIDE, MICRONIZED 100 %
2 POWDER (GRAM) MISCELLANEOUS
Qty: 0 | Refills: 0 | DISCHARGE
Start: 2019-04-25

## 2019-04-25 RX ORDER — DIPHENHYDRAMINE HCL 50 MG
10 CAPSULE ORAL
Qty: 0 | Refills: 0 | DISCHARGE
Start: 2019-04-25

## 2019-04-25 RX ORDER — QUETIAPINE FUMARATE 200 MG/1
0 TABLET, FILM COATED ORAL
Qty: 0 | Refills: 0 | COMMUNITY

## 2019-04-25 RX ORDER — TIZANIDINE 4 MG/1
1 TABLET ORAL
Qty: 0 | Refills: 0 | DISCHARGE
Start: 2019-04-25

## 2019-04-25 RX ORDER — NYSTATIN CREAM 100000 [USP'U]/G
1 CREAM TOPICAL
Qty: 0 | Refills: 0 | COMMUNITY
Start: 2019-04-25

## 2019-04-25 RX ORDER — ACETAMINOPHEN 500 MG
20 TABLET ORAL
Qty: 100 | Refills: 0
Start: 2019-04-25 | End: 2019-07-12

## 2019-04-25 RX ORDER — FERROUS SULFATE 325(65) MG
5 TABLET ORAL
Qty: 0 | Refills: 0 | DISCHARGE
Start: 2019-04-25

## 2019-04-25 RX ORDER — QUETIAPINE FUMARATE 200 MG/1
1 TABLET, FILM COATED ORAL
Qty: 0 | Refills: 0 | DISCHARGE
Start: 2019-04-25

## 2019-04-25 RX ORDER — DIPHENOXYLATE HCL/ATROPINE 2.5-.025MG
1 TABLET ORAL ONCE
Qty: 0 | Refills: 0 | Status: DISCONTINUED | OUTPATIENT
Start: 2019-04-25 | End: 2019-04-25

## 2019-04-25 RX ORDER — MULTIVIT-MIN/FERROUS GLUCONATE 9 MG/15 ML
1 LIQUID (ML) ORAL
Qty: 0 | Refills: 0 | DISCHARGE
Start: 2019-04-25

## 2019-04-25 RX ORDER — CLONAZEPAM 1 MG
3 TABLET ORAL
Qty: 0 | Refills: 0 | DISCHARGE
Start: 2019-04-25

## 2019-04-25 RX ORDER — ALBUTEROL 90 UG/1
1 AEROSOL, METERED ORAL
Qty: 0 | Refills: 0 | DISCHARGE
Start: 2019-04-25

## 2019-04-25 RX ORDER — BUDESONIDE, MICRONIZED 100 %
0 POWDER (GRAM) MISCELLANEOUS
Qty: 0 | Refills: 0 | DISCHARGE
Start: 2019-04-25

## 2019-04-25 RX ORDER — CLONAZEPAM 1 MG
3 TABLET ORAL
Qty: 0 | Refills: 0 | COMMUNITY

## 2019-04-25 RX ORDER — ACETAMINOPHEN 500 MG
20 TABLET ORAL
Qty: 100 | Refills: 0
Start: 2019-04-25 | End: 2019-05-24

## 2019-04-25 RX ADMIN — Medication 3 MILLILITER(S): at 00:21

## 2019-04-25 RX ADMIN — Medication 25 MILLIGRAM(S): at 09:23

## 2019-04-25 RX ADMIN — APIXABAN 5 MILLIGRAM(S): 2.5 TABLET, FILM COATED ORAL at 05:21

## 2019-04-25 RX ADMIN — Medication 0.5 MILLIGRAM(S): at 17:34

## 2019-04-25 RX ADMIN — Medication 2 MILLIGRAM(S): at 13:18

## 2019-04-25 RX ADMIN — Medication 1 APPLICATION(S): at 13:18

## 2019-04-25 RX ADMIN — APIXABAN 5 MILLIGRAM(S): 2.5 TABLET, FILM COATED ORAL at 17:34

## 2019-04-25 RX ADMIN — Medication 250 MILLIGRAM(S): at 09:23

## 2019-04-25 RX ADMIN — SODIUM CHLORIDE 4 MILLILITER(S): 9 INJECTION INTRAMUSCULAR; INTRAVENOUS; SUBCUTANEOUS at 00:21

## 2019-04-25 RX ADMIN — Medication 1.5 MILLIGRAM(S): at 21:40

## 2019-04-25 RX ADMIN — Medication 1 TABLET(S): at 13:17

## 2019-04-25 RX ADMIN — Medication 3: at 09:24

## 2019-04-25 RX ADMIN — QUETIAPINE FUMARATE 25 MILLIGRAM(S): 200 TABLET, FILM COATED ORAL at 21:40

## 2019-04-25 RX ADMIN — Medication 3 MILLILITER(S): at 17:34

## 2019-04-25 RX ADMIN — SODIUM CHLORIDE 4 MILLILITER(S): 9 INJECTION INTRAMUSCULAR; INTRAVENOUS; SUBCUTANEOUS at 13:16

## 2019-04-25 RX ADMIN — CHLORHEXIDINE GLUCONATE 1 APPLICATION(S): 213 SOLUTION TOPICAL at 13:17

## 2019-04-25 RX ADMIN — GABAPENTIN 300 MILLIGRAM(S): 400 CAPSULE ORAL at 20:46

## 2019-04-25 RX ADMIN — FAMOTIDINE 20 MILLIGRAM(S): 10 INJECTION INTRAVENOUS at 13:17

## 2019-04-25 RX ADMIN — TIZANIDINE 4 MILLIGRAM(S): 4 TABLET ORAL at 21:40

## 2019-04-25 RX ADMIN — SODIUM CHLORIDE 4 MILLILITER(S): 9 INJECTION INTRAMUSCULAR; INTRAVENOUS; SUBCUTANEOUS at 23:55

## 2019-04-25 RX ADMIN — Medication 0.5 MILLIGRAM(S): at 05:22

## 2019-04-25 RX ADMIN — Medication 7.5 MILLIGRAM(S): at 05:21

## 2019-04-25 RX ADMIN — GABAPENTIN 300 MILLIGRAM(S): 400 CAPSULE ORAL at 10:51

## 2019-04-25 RX ADMIN — Medication 3 MILLILITER(S): at 23:55

## 2019-04-25 RX ADMIN — Medication 2: at 13:20

## 2019-04-25 RX ADMIN — Medication 1 TABLET(S): at 10:51

## 2019-04-25 RX ADMIN — Medication 1 DROP(S): at 05:21

## 2019-04-25 RX ADMIN — Medication 3 MILLILITER(S): at 13:17

## 2019-04-25 RX ADMIN — Medication 650 MILLIGRAM(S): at 05:21

## 2019-04-25 RX ADMIN — Medication 1 APPLICATION(S): at 09:13

## 2019-04-25 RX ADMIN — SODIUM CHLORIDE 4 MILLILITER(S): 9 INJECTION INTRAMUSCULAR; INTRAVENOUS; SUBCUTANEOUS at 17:34

## 2019-04-25 RX ADMIN — Medication 1 APPLICATION(S): at 21:48

## 2019-04-25 RX ADMIN — Medication 500 MILLIGRAM(S): at 13:17

## 2019-04-25 RX ADMIN — SODIUM CHLORIDE 4 MILLILITER(S): 9 INJECTION INTRAMUSCULAR; INTRAVENOUS; SUBCUTANEOUS at 05:22

## 2019-04-25 RX ADMIN — Medication 3 MILLILITER(S): at 05:22

## 2019-04-25 RX ADMIN — TIZANIDINE 4 MILLIGRAM(S): 4 TABLET ORAL at 09:23

## 2019-04-25 RX ADMIN — Medication 300 MILLIGRAM(S): at 13:16

## 2019-04-25 NOTE — PROGRESS NOTE ADULT - SUBJECTIVE AND OBJECTIVE BOX
SUBJECTIVE / OVERNIGHT EVENTS: pt seen and examined pts  next to pts bed    MEDICATIONS  (STANDING):  ALBUTerol    90 MICROgram(s) HFA Inhaler 1 Puff(s) Inhalation every 4 hours  ALBUTerol/ipratropium for Nebulization 3 milliLiter(s) Nebulizer every 6 hours  apixaban 5 milliGRAM(s) Oral every 12 hours  artificial tears (preservative free) Ophthalmic Solution 1 Drop(s) Both EYES three times a day  ascorbic acid 500 milliGRAM(s) Oral daily  buDESOnide   0.5 milliGRAM(s) Respule 0.5 milliGRAM(s) Inhalation every 12 hours  chlorhexidine 4% Liquid 1 Application(s) Topical <User Schedule>  clonazePAM Tablet 1.5 milliGRAM(s) Oral <User Schedule>  dextrose 5%. 1000 milliLiter(s) (50 mL/Hr) IV Continuous <Continuous>  dextrose 50% Injectable 12.5 Gram(s) IV Push once  dextrose 50% Injectable 25 Gram(s) IV Push once  dextrose 50% Injectable 25 Gram(s) IV Push once  ergocalciferol 28088 Unit(s) Oral every week  famotidine    Tablet 20 milliGRAM(s) Oral daily  ferrous    sulfate Liquid 300 milliGRAM(s) Enteral Tube daily  gabapentin   Solution 300 milliGRAM(s) Oral two times a day  insulin lispro (HumaLOG) corrective regimen sliding scale   SubCutaneous three times a day before meals  insulin lispro (HumaLOG) corrective regimen sliding scale   SubCutaneous at bedtime  Medical Marijuana Oil 1 milliLiter(s),Medical Marijuana Oil 1 milliLiter(s) 1 milliLiter(s) Oral <User Schedule>  multivitamin/minerals 1 Tablet(s) Oral daily  petrolatum white Ointment 1 Application(s) Topical three times a day  predniSONE   Tablet   Oral   predniSONE   Tablet 7.5 milliGRAM(s) Oral daily  QUEtiapine 25 milliGRAM(s) Oral at bedtime  sodium chloride 3%  Inhalation 4 milliLiter(s) Inhalation every 6 hours  tiotropium 18 MICROgram(s) Capsule 1 Capsule(s) Inhalation daily  tiZANidine 4 milliGRAM(s) Oral <User Schedule>  vancomycin  IVPB 1000 milliGRAM(s) IV Intermittent every 24 hours    MEDICATIONS  (PRN):  acetaminophen    Suspension .. 650 milliGRAM(s) Oral every 6 hours PRN Temp greater or equal to 38C (100.4F), Mild Pain (1 - 3), Moderate Pain (4 - 6)  dextrose 40% Gel 15 Gram(s) Oral once PRN Blood Glucose LESS THAN 70 milliGRAM(s)/deciLiter  diphenhydrAMINE   Elixir 25 milliGRAM(s) Oral every 6 hours PRN Rash and/or Itching  diphenoxylate/atropine 1 Tablet(s) Oral two times a day PRN Diarrhea  glucagon  Injectable 1 milliGRAM(s) IntraMuscular once PRN Glucose <70 milliGRAM(s)/deciLiter  nystatin Powder 1 Application(s) Topical two times a day PRN rash/itchiness  sodium chloride 0.65% Nasal 1 Spray(s) Both Nostrils three times a day PRN Nasal Congestion    Vital Signs Last 24 Hrs  T(C): 36.5 (2019 13:03), Max: 37.4 (2019 04:30)  T(F): 97.7 (2019 13:03), Max: 99.3 (2019 04:30)  HR: 94 (2019 13:03) (62 - 116)  BP: 112/63 (2019 13:03) (112/63 - 144/72)  BP(mean): --  RR: 18 (2019 13:03) (18 - 18)  SpO2: 99% (2019 13:03) (94% - 100%)    unable to obtain ros from pt sec to pts cognitive status    PHYSICAL EXAM:  GENERAL: NAD  EYES: EOMI, PERRLA  NECK: Supple, No JVD  CHEST/LUNG: dec breath sounds at bases , no wheezing   HEART:  S1 , S2 +  ABDOMEN: soft , bs+  EXTREMITIES:  no edema    LABS:      131<L>  |  91<L>  |  28<H>  ----------------------------<  152<H>  4.1   |  27  |  <0.30<L>    Ca    8.6      2019 07:06      Creatinine Trend: <0.30 <--, <0.30 <--, <0.30 <--, <0.30 <--, <0.30 <--, <0.30 <--, 0.32 <--, 0.30 <--                        9.8    12.4  )-----------( 342      ( 2019 07:07 )             30.4     Urine Studies:  Urinalysis Basic - ( 2019 05:44 )    Color: Light Yellow / Appearance: Clear / S.016 / pH:   Gluc:  / Ketone: Negative  / Bili: Negative / Urobili: Negative   Blood:  / Protein: 30 mg/dL / Nitrite: Negative   Leuk Esterase: Small / RBC: 19 /hpf / WBC 23 /HPF   Sq Epi:  / Non Sq Epi: 1 / Bacteria: Negative                                    RADIOLOGY & ADDITIONAL TESTS:    Imaging Personally Reviewed:    Consultant(s) Notes Reviewed:      Care Discussed with Consultants/Other Providers:

## 2019-04-25 NOTE — CHART NOTE - NSCHARTNOTEFT_GEN_A_CORE
MEDICINE MYRIAM MCCOY  68y Female  Patient is a 68y old  Female who presents with a chief complaint of fevers (24 Apr 2019 19:25)       Event Summary: Fever 102  /71 SpO2 94% RA RR 18  Patient seen at bedside:        Vital Signs Last 24 Hrs  T(C): 37.4 (25 Apr 2019 04:30), Max: 37.4 (25 Apr 2019 04:30)  T(F): 99.3 (25 Apr 2019 04:30), Max: 99.3 (25 Apr 2019 04:30)  HR: 116 (25 Apr 2019 04:30) (62 - 116)  BP: 138/71 (25 Apr 2019 04:30) (100/55 - 144/72)  BP(mean): --  RR: 18 (25 Apr 2019 04:30) (18 - 18)  SpO2: 94% (25 Apr 2019 04:30) (94% - 100%)    PHYSICAL EXAM:  GENERAL: Laying down, in no acute distress  PSYCH: AAOx3  HEAD:  Atraumatic, Normocephalic  EYES: EOMI, PERRLA, conjunctiva and sclera clear  NECK: Supple, No JVD  CHEST/LUNG: Breath sounds clear to auscultation bilaterally.  No wheezes, rales, rhonchi or crackles  HEART: +S1/S2.  Regular rate and rhythm.  No murmurs, rubs or gallops  ABDOMEN: Bowel sounds present in all 4 quadrants.  Soft, Nontender, Nondistended  EXTREMITIES: No edema or erythema noted in bilateral lower extremities  NEUROLOGY: Cranial nerves 2-12 grossly intact  SKIN: No rashes or lesions      Plan:              Stephanie Hu PA-C  Medicine Department MEDICINE MYRIAM MCCOY  68y Female  Patient is a 68y old  Female who presents with a chief complaint of fevers (24 Apr 2019 19:25)       Event Summary: Fever 102  /71 SpO2 94% RA RR 18  Patient seen at bedside: NAD, nonverbal. As per RN, patient has had x1 loose BM this AM. Last BM before yesterday AM.    Vital Signs Last 24 Hrs  T(C): 37.4 (25 Apr 2019 04:30), Max: 37.4 (25 Apr 2019 04:30)  T(F): 99.3 (25 Apr 2019 04:30), Max: 99.3 (25 Apr 2019 04:30)  HR: 116 (25 Apr 2019 04:30) (62 - 116)  BP: 138/71 (25 Apr 2019 04:30) (100/55 - 144/72)  BP(mean): --  RR: 18 (25 Apr 2019 04:30) (18 - 18)  SpO2: 94% (25 Apr 2019 04:30) (94% - 100%)    PHYSICAL EXAM:  GENERAL: Laying down, in no acute distress  PSYCH: AAOx3  HEAD:  Atraumatic, Normocephalic  EYES: EOMI, PERRLA, conjunctiva and sclera clear  NECK: Supple, No JVD  CHEST/LUNG: Breath sounds clear to auscultation bilaterally.  No wheezes, rales, rhonchi or crackles  HEART: +S1/S2.  Regular rate and rhythm.  No murmurs, rubs or gallops  ABDOMEN: Bowel sounds present in all 4 quadrants.  Soft, Nontender, Nondistended  EXTREMITIES: No edema or erythema noted in bilateral lower extremities  NEUROLOGY: Cranial nerves 2-12 grossly intact  SKIN: No rashes or lesions    UCx from 4/22: yeast like cells  BCx x2 from 4/22: no growth prelim  CXR from 4/19: Previously noted lucency along the right hemidiaphragm is no longer   evident. Mild pulmonary vascular congestion and trace bilateral pleural   effusions.      Assessment: 68 yr old with advanced dementia , bed bound, contracted, cva, sp removal of infected hip due to mrsa with placement of space last fall.   Pt has been havening fevers for months.        Plan: Tylenol given, cooling measures  -ordered Bcx2, UA, UCx, CXR  -Cont monitoring pt, will endorse to AM team  -ID following pt  -Cont yuridia Hu PA-C  Medicine Department

## 2019-04-25 NOTE — CHART NOTE - NSCHARTNOTEFT_GEN_A_CORE
Reviewed Chest Xray results with  , He is hesitant  about discharge today .   Would prefer to have her observe in hospital for  24 hrs . Requesting for a follow up  Xray also   will follow up with DR Carcamo

## 2019-04-25 NOTE — PROGRESS NOTE ADULT - SUBJECTIVE AND OBJECTIVE BOX
MYRIAM WHITE:[id number],   68yFemale followed for:  ASA; dye contrast (Anaphylaxis)  aspirin (Short breath)  divalproex sodium (Other (Unknown))  Flowers and Plants (Short breath)  Haldol (Other (Unknown))  penicillin (Short breath; Rash)  sulfa drugs (Short breath; Rash)  vancomycin (Rash; Urticaria; Hives)  Xanax (Other (Unknown))    PAST MEDICAL & SURGICAL HISTORY:  CVA (cerebral vascular accident)  Fatty pancreas  PNA (pneumonia)  Pulmonary HTN  IGT (impaired glucose tolerance)  Ulcerative colitis  Acid reflux  Anxiety  Depression  Mouth sores  HLD (hyperlipidemia)  Asthma  Humeral head fracture  H/O: hysterectomy  H/O cataract extraction, left  History of knee replacement    FAMILY HISTORY:  Family history of dementia (Grandparent)  Family history of colon cancer (Grandparent)    MEDICATIONS  (STANDING):  ALBUTerol    90 MICROgram(s) HFA Inhaler 1 Puff(s) Inhalation every 4 hours  ALBUTerol/ipratropium for Nebulization 3 milliLiter(s) Nebulizer every 6 hours  apixaban 5 milliGRAM(s) Oral every 12 hours  artificial tears (preservative free) Ophthalmic Solution 1 Drop(s) Both EYES three times a day  ascorbic acid 500 milliGRAM(s) Oral daily  buDESOnide   0.5 milliGRAM(s) Respule 0.5 milliGRAM(s) Inhalation every 12 hours  chlorhexidine 4% Liquid 1 Application(s) Topical <User Schedule>  clonazePAM Tablet 1.5 milliGRAM(s) Oral <User Schedule>  dextrose 5%. 1000 milliLiter(s) (50 mL/Hr) IV Continuous <Continuous>  dextrose 50% Injectable 12.5 Gram(s) IV Push once  dextrose 50% Injectable 25 Gram(s) IV Push once  dextrose 50% Injectable 25 Gram(s) IV Push once  ergocalciferol 30594 Unit(s) Oral every week  famotidine    Tablet 20 milliGRAM(s) Oral daily  ferrous    sulfate Liquid 300 milliGRAM(s) Enteral Tube daily  gabapentin   Solution 300 milliGRAM(s) Oral two times a day  insulin lispro (HumaLOG) corrective regimen sliding scale   SubCutaneous three times a day before meals  insulin lispro (HumaLOG) corrective regimen sliding scale   SubCutaneous at bedtime  loperamide Solution 2 milliGRAM(s) Enteral Tube daily  Medical Marijuana Oil 1 milliLiter(s),Medical Marijuana Oil 1 milliLiter(s) 1 milliLiter(s) Oral <User Schedule>  multivitamin/minerals 1 Tablet(s) Oral daily  petrolatum white Ointment 1 Application(s) Topical three times a day  predniSONE   Tablet   Oral   predniSONE   Tablet 7.5 milliGRAM(s) Oral daily  QUEtiapine 25 milliGRAM(s) Oral at bedtime  sodium chloride 3%  Inhalation 4 milliLiter(s) Inhalation every 6 hours  tiotropium 18 MICROgram(s) Capsule 1 Capsule(s) Inhalation daily  tiZANidine 4 milliGRAM(s) Oral <User Schedule>  vancomycin  IVPB 1000 milliGRAM(s) IV Intermittent every 24 hours    MEDICATIONS  (PRN):  acetaminophen    Suspension .. 650 milliGRAM(s) Oral every 6 hours PRN Temp greater or equal to 38C (100.4F), Mild Pain (1 - 3), Moderate Pain (4 - 6)  dextrose 40% Gel 15 Gram(s) Oral once PRN Blood Glucose LESS THAN 70 milliGRAM(s)/deciLiter  diphenhydrAMINE   Elixir 25 milliGRAM(s) Oral every 6 hours PRN Rash and/or Itching  glucagon  Injectable 1 milliGRAM(s) IntraMuscular once PRN Glucose <70 milliGRAM(s)/deciLiter  nystatin Powder 1 Application(s) Topical two times a day PRN rash/itchiness  sodium chloride 0.65% Nasal 1 Spray(s) Both Nostrils three times a day PRN Nasal Congestion      Vital Signs Last 24 Hrs  T(C): 37.4 (25 Apr 2019 04:30), Max: 37.4 (25 Apr 2019 04:30)  T(F): 99.3 (25 Apr 2019 04:30), Max: 99.3 (25 Apr 2019 04:30)  HR: 116 (25 Apr 2019 04:30) (62 - 116)  BP: 138/71 (25 Apr 2019 04:30) (100/55 - 144/72)  BP(mean): --  RR: 18 (25 Apr 2019 04:30) (18 - 18)  SpO2: 94% (25 Apr 2019 04:30) (94% - 100%)  nc/at  s1s2  cta  soft, nt, nd no guarding or rebound  no c/c/e    CBC Full  -  ( 25 Apr 2019 07:07 )  WBC Count : 12.4 K/uL  RBC Count : 3.25 M/uL  Hemoglobin : 9.8 g/dL  Hematocrit : 30.4 %  Platelet Count - Automated : 342 K/uL  Mean Cell Volume : 93.4 fl  Mean Cell Hemoglobin : 30.2 pg  Mean Cell Hemoglobin Concentration : 32.3 gm/dL  Auto Neutrophil # : x  Auto Lymphocyte # : x  Auto Monocyte # : x  Auto Eosinophil # : x  Auto Basophil # : x  Auto Neutrophil % : x  Auto Lymphocyte % : x  Auto Monocyte % : x  Auto Eosinophil % : x  Auto Basophil % : x    04-25    131<L>  |  91<L>  |  28<H>  ----------------------------<  152<H>  4.1   |  27  |  <0.30<L>    Ca    8.6      25 Apr 2019 07:06

## 2019-04-25 NOTE — PROGRESS NOTE ADULT - SUBJECTIVE AND OBJECTIVE BOX
infectious diseases progress note:    Patient is a 68y old  Female who presents with a chief complaint of fevers (2019 19:25)        Fever          Allergies    ASA; dye contrast (Anaphylaxis)  aspirin (Short breath)  divalproex sodium (Other (Unknown))  Flowers and Plants (Short breath)  Haldol (Other (Unknown))  penicillin (Short breath; Rash)  sulfa drugs (Short breath; Rash)  vancomycin (Rash; Urticaria; Hives)  Xanax (Other (Unknown))    Intolerances        ANTIBIOTICS/RELEVANT:  antimicrobials  vancomycin  IVPB 1000 milliGRAM(s) IV Intermittent every 24 hours    immunologic:    OTHER:  acetaminophen    Suspension .. 650 milliGRAM(s) Oral every 6 hours PRN  ALBUTerol    90 MICROgram(s) HFA Inhaler 1 Puff(s) Inhalation every 4 hours  ALBUTerol/ipratropium for Nebulization 3 milliLiter(s) Nebulizer every 6 hours  apixaban 5 milliGRAM(s) Oral every 12 hours  artificial tears (preservative free) Ophthalmic Solution 1 Drop(s) Both EYES three times a day  ascorbic acid 500 milliGRAM(s) Oral daily  buDESOnide   0.5 milliGRAM(s) Respule 0.5 milliGRAM(s) Inhalation every 12 hours  chlorhexidine 4% Liquid 1 Application(s) Topical <User Schedule>  clonazePAM Tablet 1.5 milliGRAM(s) Oral <User Schedule>  dextrose 40% Gel 15 Gram(s) Oral once PRN  dextrose 5%. 1000 milliLiter(s) IV Continuous <Continuous>  dextrose 50% Injectable 12.5 Gram(s) IV Push once  dextrose 50% Injectable 25 Gram(s) IV Push once  dextrose 50% Injectable 25 Gram(s) IV Push once  diphenhydrAMINE   Elixir 25 milliGRAM(s) Oral every 6 hours PRN  ergocalciferol 62538 Unit(s) Oral every week  famotidine    Tablet 20 milliGRAM(s) Oral daily  ferrous    sulfate Liquid 300 milliGRAM(s) Enteral Tube daily  gabapentin   Solution 300 milliGRAM(s) Oral two times a day  glucagon  Injectable 1 milliGRAM(s) IntraMuscular once PRN  insulin lispro (HumaLOG) corrective regimen sliding scale   SubCutaneous three times a day before meals  insulin lispro (HumaLOG) corrective regimen sliding scale   SubCutaneous at bedtime  loperamide Solution 2 milliGRAM(s) Enteral Tube daily  Medical Marijuana Oil 1 milliLiter(s),Medical Marijuana Oil 1 milliLiter(s) 1 milliLiter(s) Oral <User Schedule>  multivitamin/minerals 1 Tablet(s) Oral daily  nystatin Powder 1 Application(s) Topical two times a day PRN  petrolatum white Ointment 1 Application(s) Topical three times a day  predniSONE   Tablet   Oral   predniSONE   Tablet 7.5 milliGRAM(s) Oral daily  QUEtiapine 25 milliGRAM(s) Oral at bedtime  sodium chloride 0.65% Nasal 1 Spray(s) Both Nostrils three times a day PRN  sodium chloride 3%  Inhalation 4 milliLiter(s) Inhalation every 6 hours  tiotropium 18 MICROgram(s) Capsule 1 Capsule(s) Inhalation daily  tiZANidine 4 milliGRAM(s) Oral <User Schedule>      Objective:  Vital Signs Last 24 Hrs  T(C): 37.4 (2019 04:30), Max: 37.4 (2019 04:30)  T(F): 99.3 (2019 04:30), Max: 99.3 (2019 04:30)  HR: 116 (2019 04:30) (62 - 116)  BP: 138/71 (2019 04:30) (100/55 - 144/72)  BP(mean): --  RR: 18 (2019 04:30) (18 - 18)  SpO2: 94% (2019 04:30) (94% - 100%)        Eyes:JACQUELIN, EOMI  Ear/Nose/Throat: no oral lesion, no sinus tenderness on percussion	  Neck:no JVD, no lymphadenopathy, supple   l     Gastrointestinal:soft, (+) BS, no HSM  Extremities: contracted          LABS:                        9.8    12.4  )-----------( 342      ( 2019 07:07 )             30.4     04-24    130<L>  |  90<L>  |  32<H>  ----------------------------<  153<H>  3.8   |  29  |  <0.30<L>    Ca    8.1<L>      2019 05:08        Urinalysis Basic - ( 2019 05:44 )    Color: Light Yellow / Appearance: Clear / S.016 / pH: x  Gluc: x / Ketone: Negative  / Bili: Negative / Urobili: Negative   Blood: x / Protein: 30 mg/dL / Nitrite: Negative   Leuk Esterase: Small / RBC: 19 /hpf / WBC 23 /HPF   Sq Epi: x / Non Sq Epi: 1 / Bacteria: Negative          MICROBIOLOGY:    RECENT CULTURES:   @ 09:04 .Blood                No growth to date.     @ 08:59 .Urine                50,000 - 99,000 CFU/mL Yeast-like cells, presumptively not Candida  albicans     @ 01:39 .Blood                No growth at 5 days.     @ 01:36 .Urine                >=3 organisms. Probable collection contamination.          RESPIRATORY CULTURES:      Clostridium difficile [facility] Toxins A&amp;B, EIA:   Negative ( @ 18:33)          RADIOLOGY & ADDITIONAL STUDIES:        Pager [phone]  After 5 pm/weekends or if no response :[phone]

## 2019-04-25 NOTE — PROGRESS NOTE ADULT - ASSESSMENT
68 yr old with advanced dementia , bed bound, contracted, cva, sp removal of infected hip due to mrsa with placement of space last fall.   Pt has been havening fevers for months.  Suspect there is ongoing Om of the hip site but changing the spacer is not a realistic option . chronic Om of the sacrum usually does not cause fevers and does not need prolonged ab therapy .     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      pt has been interm been febrile for months   i still suspect that is it mostly related to a mrsa infection of the space in her hip that can npt be removed.   off meropenem again.  would not restart  additional ab for fever unless she has  accompanying signs of sepsis   would not reculture for every fever spike    Previously had scans and full FUO workup  and pt s family aware that she can not undergo any invasive therapeutic or diagnostic procedures   will continue suppressive vanco for another 10 days at home  discussed with care coordinator

## 2019-04-25 NOTE — PROVIDER CONTACT NOTE (OTHER) - ASSESSMENT
Pt. nonverbal but alert. VS temp rectal 102, , /71, pulseox 94% RR 18. Pt. warm to touch, shows no signs of distress.

## 2019-04-26 LAB
-  K. PNEUMONIAE GROUP: SIGNIFICANT CHANGE UP
ANION GAP SERPL CALC-SCNC: 11 MMOL/L — SIGNIFICANT CHANGE UP (ref 5–17)
BUN SERPL-MCNC: 30 MG/DL — HIGH (ref 7–23)
CALCIUM SERPL-MCNC: 8.9 MG/DL — SIGNIFICANT CHANGE UP (ref 8.4–10.5)
CHLORIDE SERPL-SCNC: 92 MMOL/L — LOW (ref 96–108)
CO2 SERPL-SCNC: 28 MMOL/L — SIGNIFICANT CHANGE UP (ref 22–31)
CREAT SERPL-MCNC: <0.3 MG/DL — LOW (ref 0.5–1.3)
CULTURE RESULTS: SIGNIFICANT CHANGE UP
GLUCOSE BLDC GLUCOMTR-MCNC: 121 MG/DL — HIGH (ref 70–99)
GLUCOSE BLDC GLUCOMTR-MCNC: 145 MG/DL — HIGH (ref 70–99)
GLUCOSE BLDC GLUCOMTR-MCNC: 211 MG/DL — HIGH (ref 70–99)
GLUCOSE BLDC GLUCOMTR-MCNC: 223 MG/DL — HIGH (ref 70–99)
GLUCOSE SERPL-MCNC: 234 MG/DL — HIGH (ref 70–99)
HCT VFR BLD CALC: 33.3 % — LOW (ref 34.5–45)
HGB BLD-MCNC: 11 G/DL — LOW (ref 11.5–15.5)
MCHC RBC-ENTMCNC: 31.5 PG — SIGNIFICANT CHANGE UP (ref 27–34)
MCHC RBC-ENTMCNC: 33.1 GM/DL — SIGNIFICANT CHANGE UP (ref 32–36)
MCV RBC AUTO: 95.4 FL — SIGNIFICANT CHANGE UP (ref 80–100)
METHOD TYPE: SIGNIFICANT CHANGE UP
PLATELET # BLD AUTO: 356 K/UL — SIGNIFICANT CHANGE UP (ref 150–400)
POTASSIUM SERPL-MCNC: 4.6 MMOL/L — SIGNIFICANT CHANGE UP (ref 3.5–5.3)
POTASSIUM SERPL-SCNC: 4.6 MMOL/L — SIGNIFICANT CHANGE UP (ref 3.5–5.3)
RBC # BLD: 3.49 M/UL — LOW (ref 3.8–5.2)
RBC # FLD: 15.9 % — HIGH (ref 10.3–14.5)
SODIUM SERPL-SCNC: 131 MMOL/L — LOW (ref 135–145)
SPECIMEN SOURCE: SIGNIFICANT CHANGE UP
WBC # BLD: 15.9 K/UL — HIGH (ref 3.8–10.5)
WBC # FLD AUTO: 15.9 K/UL — HIGH (ref 3.8–10.5)

## 2019-04-26 PROCEDURE — 74176 CT ABD & PELVIS W/O CONTRAST: CPT | Mod: 26

## 2019-04-26 PROCEDURE — 99233 SBSQ HOSP IP/OBS HIGH 50: CPT

## 2019-04-26 RX ORDER — DIPHENOXYLATE HCL/ATROPINE 2.5-.025MG
2 TABLET ORAL
Qty: 0 | Refills: 0 | Status: DISCONTINUED | OUTPATIENT
Start: 2019-04-26 | End: 2019-05-03

## 2019-04-26 RX ORDER — MEROPENEM 1 G/30ML
1000 INJECTION INTRAVENOUS EVERY 12 HOURS
Qty: 0 | Refills: 0 | Status: DISCONTINUED | OUTPATIENT
Start: 2019-04-26 | End: 2019-04-29

## 2019-04-26 RX ORDER — MEROPENEM 1 G/30ML
1000 INJECTION INTRAVENOUS ONCE
Qty: 0 | Refills: 0 | Status: COMPLETED | OUTPATIENT
Start: 2019-04-26 | End: 2019-04-26

## 2019-04-26 RX ADMIN — QUETIAPINE FUMARATE 25 MILLIGRAM(S): 200 TABLET, FILM COATED ORAL at 21:52

## 2019-04-26 RX ADMIN — MEROPENEM 100 MILLIGRAM(S): 1 INJECTION INTRAVENOUS at 21:50

## 2019-04-26 RX ADMIN — APIXABAN 5 MILLIGRAM(S): 2.5 TABLET, FILM COATED ORAL at 06:52

## 2019-04-26 RX ADMIN — TIZANIDINE 4 MILLIGRAM(S): 4 TABLET ORAL at 21:52

## 2019-04-26 RX ADMIN — SODIUM CHLORIDE 4 MILLILITER(S): 9 INJECTION INTRAMUSCULAR; INTRAVENOUS; SUBCUTANEOUS at 15:22

## 2019-04-26 RX ADMIN — Medication 300 MILLIGRAM(S): at 15:21

## 2019-04-26 RX ADMIN — Medication 3 MILLILITER(S): at 06:54

## 2019-04-26 RX ADMIN — Medication 2: at 14:54

## 2019-04-26 RX ADMIN — Medication 1 TABLET(S): at 14:51

## 2019-04-26 RX ADMIN — GABAPENTIN 300 MILLIGRAM(S): 400 CAPSULE ORAL at 21:51

## 2019-04-26 RX ADMIN — Medication 1 APPLICATION(S): at 14:00

## 2019-04-26 RX ADMIN — TIZANIDINE 4 MILLIGRAM(S): 4 TABLET ORAL at 15:20

## 2019-04-26 RX ADMIN — Medication 1 APPLICATION(S): at 08:06

## 2019-04-26 RX ADMIN — Medication 1.5 MILLIGRAM(S): at 21:52

## 2019-04-26 RX ADMIN — CHLORHEXIDINE GLUCONATE 1 APPLICATION(S): 213 SOLUTION TOPICAL at 08:09

## 2019-04-26 RX ADMIN — SODIUM CHLORIDE 4 MILLILITER(S): 9 INJECTION INTRAMUSCULAR; INTRAVENOUS; SUBCUTANEOUS at 06:53

## 2019-04-26 RX ADMIN — Medication 3 MILLILITER(S): at 14:50

## 2019-04-26 RX ADMIN — GABAPENTIN 300 MILLIGRAM(S): 400 CAPSULE ORAL at 14:49

## 2019-04-26 RX ADMIN — APIXABAN 5 MILLIGRAM(S): 2.5 TABLET, FILM COATED ORAL at 21:51

## 2019-04-26 RX ADMIN — Medication 1 APPLICATION(S): at 21:53

## 2019-04-26 RX ADMIN — MEROPENEM 100 MILLIGRAM(S): 1 INJECTION INTRAVENOUS at 01:48

## 2019-04-26 RX ADMIN — Medication 3 MILLILITER(S): at 23:26

## 2019-04-26 RX ADMIN — Medication 1 DROP(S): at 14:53

## 2019-04-26 RX ADMIN — Medication 500 MILLIGRAM(S): at 14:51

## 2019-04-26 RX ADMIN — Medication 250 MILLIGRAM(S): at 10:00

## 2019-04-26 RX ADMIN — SODIUM CHLORIDE 4 MILLILITER(S): 9 INJECTION INTRAMUSCULAR; INTRAVENOUS; SUBCUTANEOUS at 23:26

## 2019-04-26 RX ADMIN — Medication 2 TABLET(S): at 21:51

## 2019-04-26 RX ADMIN — Medication 0.5 MILLIGRAM(S): at 06:53

## 2019-04-26 RX ADMIN — Medication 1 DROP(S): at 06:31

## 2019-04-26 RX ADMIN — Medication 7.5 MILLIGRAM(S): at 06:52

## 2019-04-26 RX ADMIN — Medication 1 DROP(S): at 21:51

## 2019-04-26 RX ADMIN — FAMOTIDINE 20 MILLIGRAM(S): 10 INJECTION INTRAVENOUS at 14:52

## 2019-04-26 NOTE — PROGRESS NOTE ADULT - SUBJECTIVE AND OBJECTIVE BOX
SUBJECTIVE / OVERNIGHT EVENTS: pt seen and examined     MEDICATIONS  (STANDING):  ALBUTerol    90 MICROgram(s) HFA Inhaler 1 Puff(s) Inhalation every 4 hours  ALBUTerol/ipratropium for Nebulization 3 milliLiter(s) Nebulizer every 6 hours  apixaban 5 milliGRAM(s) Oral every 12 hours  artificial tears (preservative free) Ophthalmic Solution 1 Drop(s) Both EYES three times a day  ascorbic acid 500 milliGRAM(s) Oral daily  buDESOnide   0.5 milliGRAM(s) Respule 0.5 milliGRAM(s) Inhalation every 12 hours  chlorhexidine 4% Liquid 1 Application(s) Topical <User Schedule>  clonazePAM Tablet 1.5 milliGRAM(s) Oral <User Schedule>  dextrose 5%. 1000 milliLiter(s) (50 mL/Hr) IV Continuous <Continuous>  dextrose 50% Injectable 12.5 Gram(s) IV Push once  dextrose 50% Injectable 25 Gram(s) IV Push once  dextrose 50% Injectable 25 Gram(s) IV Push once  diphenoxylate/atropine 2 Tablet(s) Oral two times a day  ergocalciferol 33298 Unit(s) Oral every week  famotidine    Tablet 20 milliGRAM(s) Oral daily  ferrous    sulfate Liquid 300 milliGRAM(s) Enteral Tube daily  gabapentin   Solution 300 milliGRAM(s) Oral two times a day  insulin lispro (HumaLOG) corrective regimen sliding scale   SubCutaneous three times a day before meals  insulin lispro (HumaLOG) corrective regimen sliding scale   SubCutaneous at bedtime  Medical Marijuana Oil 1 milliLiter(s),Medical Marijuana Oil 1 milliLiter(s) 1 milliLiter(s) Oral <User Schedule>  meropenem  IVPB 1000 milliGRAM(s) IV Intermittent every 12 hours  multivitamin/minerals 1 Tablet(s) Oral daily  petrolatum white Ointment 1 Application(s) Topical three times a day  predniSONE   Tablet   Oral   predniSONE   Tablet 7.5 milliGRAM(s) Oral daily  QUEtiapine 25 milliGRAM(s) Oral at bedtime  sodium chloride 3%  Inhalation 4 milliLiter(s) Inhalation every 6 hours  tiotropium 18 MICROgram(s) Capsule 1 Capsule(s) Inhalation daily  tiZANidine 4 milliGRAM(s) Oral <User Schedule>  vancomycin  IVPB 1000 milliGRAM(s) IV Intermittent every 24 hours    MEDICATIONS  (PRN):  acetaminophen    Suspension .. 650 milliGRAM(s) Oral every 6 hours PRN Temp greater or equal to 38C (100.4F), Mild Pain (1 - 3), Moderate Pain (4 - 6)  dextrose 40% Gel 15 Gram(s) Oral once PRN Blood Glucose LESS THAN 70 milliGRAM(s)/deciLiter  diphenhydrAMINE   Elixir 25 milliGRAM(s) Oral every 6 hours PRN Rash and/or Itching  glucagon  Injectable 1 milliGRAM(s) IntraMuscular once PRN Glucose <70 milliGRAM(s)/deciLiter  nystatin Powder 1 Application(s) Topical two times a day PRN rash/itchiness  sodium chloride 0.65% Nasal 1 Spray(s) Both Nostrils three times a day PRN Nasal Congestion    Vital Signs Last 24 Hrs  T(C): 36.5 (2019 20:49), Max: 37.1 (2019 02:37)  T(F): 97.7 (2019 20:49), Max: 98.7 (2019 02:37)  HR: 107 (2019 20:49) (100 - 109)  BP: 148/87 (2019 20:49) (112/65 - 148/87)  BP(mean): --  RR: 19 (2019 20:49) (18 - 20)  SpO2: 97% (2019 20:49) (94% - 97%)    unable to obtain ros from pt sec to pts cognitive status    PHYSICAL EXAM:  GENERAL: NAD  EYES: EOMI, PERRLA  NECK: Supple, No JVD  CHEST/LUNG: dec breath sounds at bases , no wheezing   HEART:  S1 , S2 +  ABDOMEN: soft , bs+  EXTREMITIES:  no edema    LABS:      131<L>  |  92<L>  |  30<H>  ----------------------------<  234<H>  4.6   |  28  |  <0.30<L>    Ca    8.9      2019 09:23      Creatinine Trend: <0.30 <--, <0.30 <--, <0.30 <--, <0.30 <--, <0.30 <--, <0.30 <--, <0.30 <--                        11.0   15.9  )-----------( 356      ( 2019 09:23 )             33.3     Urine Studies:  Urinalysis Basic - ( 2019 05:44 )    Color: Light Yellow / Appearance: Clear / S.016 / pH:   Gluc:  / Ketone: Negative  / Bili: Negative / Urobili: Negative   Blood:  / Protein: 30 mg/dL / Nitrite: Negative   Leuk Esterase: Small / RBC: 19 /hpf / WBC 23 /HPF   Sq Epi:  / Non Sq Epi: 1 / Bacteria: Negative                                              RADIOLOGY & ADDITIONAL TESTS:    Imaging Personally Reviewed:    Consultant(s) Notes Reviewed:      Care Discussed with Consultants/Other Providers:

## 2019-04-26 NOTE — PROGRESS NOTE ADULT - ASSESSMENT
68 yr old with advanced dementia , bed bound, contracted, cva, sp removal of infected hip due to mrsa with placement of space last fall.    Pt has been having fevers for months  Suspected ongoing Om of the hip site but changing the spacer is not a realistic option--On Vanco coverage  Chronic Om of the sacrum usually does not cause fevers and does not need prolonged ab therapy  PICC line changed 4/18  Has leukocytosis, intermittent fevers  CXR with ? PNA  UA equivocal (UCX pending)  BCX now with K pne S pending  Possible source broad--intra-abd in setting of recent diarrhea, UTI, PNA, PICC?  Overall, new K pne bacteremia, MRSA joint infection, leukocytosis, fever, PCN allergy  - Meropenem 1g q 12 (CrCl 49)  - Vanco 1g q 24, monitor trough  - F/U pending BCX/UCX; repeat BCX  - Check CT A/P w/ contrast (if can tolerate)  - Consideration for PICC infection or alternate source depending on initial workup  - Discussed case with medicine NP    Deniz Bautista MD  Pager [phone]  After 5pm and on weekends call [phone]

## 2019-04-26 NOTE — CHART NOTE - NSCHARTNOTEFT_GEN_A_CORE
PA Medicine Event Noted     x1 +BCx: Gm neg rods  Spoke to pharmacy and confirmed meropenem 1g sufficient to cover.  Pt is on vanco 1g q 24 h. As per pharmacy, Gm- rods may show resistance to vanco.    Ordered meropenem 1g x1 dose. Given at 1:48 AM  Ordered x2 BCx repeat.  Endorsed to AM team to f/u with ID for abx and repeat Bcx    Stephanie Hu PA-C  Dept of Medicine

## 2019-04-26 NOTE — PROGRESS NOTE ADULT - SUBJECTIVE AND OBJECTIVE BOX
CC: F/U for Bacteremia    Saw/spoke to patient. Family at bedside. Patient was planned for DC, but then had episode of worsening WBC and found to have K pne bacteremia. ID called for further eval. She is not able to provide further history (baseline mental status).    Allergies  ASA; dye contrast (Anaphylaxis)  aspirin (Short breath)  divalproex sodium (Other (Unknown))  Flowers and Plants (Short breath)  Haldol (Other (Unknown))  penicillin (Short breath; Rash)  sulfa drugs (Short breath; Rash)  vancomycin (Rash; Urticaria; Hives)  Xanax (Other (Unknown))    ANTIMICROBIALS:  meropenem  IVPB 1000 every 12 hours  vancomycin  IVPB 1000 every 24 hours    PE:    Vital Signs Last 24 Hrs  T(C): 36.4 (2019 10:55), Max: 37.1 (2019 02:37)  T(F): 97.5 (2019 10:55), Max: 98.7 (2019 02:37)  HR: 109 (2019 10:55) (99 - 109)  BP: 112/65 (2019 10:55) (112/65 - 131/69)  RR: 19 (2019 10:55) (18 - 20)  SpO2: 96% (2019 10:55) (94% - 99%)    Gen: AOx1, NAD, chronically ill appearing, contracted  CV: S1+S2 normal, tachycardic  Resp: Clear bilat, no resp distress, no crackles/wheezes  Abd: Soft, nontender, +BS, PEG  Ext: Contracted limbs  : Blackman, cloudy urine  IV/Skin: LUE PICC--clean, no erythema    LABS:                        11.0   15.9  )-----------( 356      ( 2019 09:23 )             33.3     04-26    131<L>  |  92<L>  |  30<H>  ----------------------------<  234<H>  4.6   |  28  |  <0.30<L>    Ca    8.9      2019 09:23    Urinalysis Basic - ( 2019 05:44 )    Color: Light Yellow / Appearance: Clear / S.016 / pH: x  Gluc: x / Ketone: Negative  / Bili: Negative / Urobili: Negative   Blood: x / Protein: 30 mg/dL / Nitrite: Negative   Leuk Esterase: Small / RBC: 19 /hpf / WBC 23 /HPF   Sq Epi: x / Non Sq Epi: 1 / Bacteria: Negative    MICROBIOLOGY:    .Blood  19   No growth to date.  --  Blood Culture PCR K pne    .Blood  19   No growth to date.    .Urine  19   50,000 - 99,000 CFU/mL Yeast-like cells, presumptively not Candida  albicans  --  --    Clostridium difficile [facility] Toxins A&amp;B, EIA:   Negative (19 @ 18:33)    (otherwise reviewed)    RADIOLOGY:     CXR:    FINDINGS:  Left-sided PICC line with tip in SVC.  Patchy opacities are noted within the left lung.  The cardiomediastinal silhouette is unremarkable.  The visualized osseous and soft tissue structures demonstrate no acute   pathology.    IMPRESSION:   Patchy opacities noted within the left lung most likely represent   pneumonia.    3/2 CT:    IMPRESSION:   Limited exam.    Left-sided sacral decubitus ulcer without definite cortical erosion of   the underlying sacrum. MRI would be more sensitive to evaluate for   osteomyelitis.    No acute intra-abdominal pathology or collection.

## 2019-04-26 NOTE — PROGRESS NOTE ADULT - SUBJECTIVE AND OBJECTIVE BOX
INTERVAL HPI/OVERNIGHT EVENTS: BC pos for gram neg rods, having loose stools on low dose lomotil prn, h/o UC, c. diff neg, on prednisone taper    MEDICATIONS  (STANDING):  ALBUTerol    90 MICROgram(s) HFA Inhaler 1 Puff(s) Inhalation every 4 hours  ALBUTerol/ipratropium for Nebulization 3 milliLiter(s) Nebulizer every 6 hours  apixaban 5 milliGRAM(s) Oral every 12 hours  artificial tears (preservative free) Ophthalmic Solution 1 Drop(s) Both EYES three times a day  ascorbic acid 500 milliGRAM(s) Oral daily  buDESOnide   0.5 milliGRAM(s) Respule 0.5 milliGRAM(s) Inhalation every 12 hours  chlorhexidine 4% Liquid 1 Application(s) Topical <User Schedule>  clonazePAM Tablet 1.5 milliGRAM(s) Oral <User Schedule>  dextrose 5%. 1000 milliLiter(s) (50 mL/Hr) IV Continuous <Continuous>  dextrose 50% Injectable 12.5 Gram(s) IV Push once  dextrose 50% Injectable 25 Gram(s) IV Push once  dextrose 50% Injectable 25 Gram(s) IV Push once  ergocalciferol 44822 Unit(s) Oral every week  famotidine    Tablet 20 milliGRAM(s) Oral daily  ferrous    sulfate Liquid 300 milliGRAM(s) Enteral Tube daily  gabapentin   Solution 300 milliGRAM(s) Oral two times a day  insulin lispro (HumaLOG) corrective regimen sliding scale   SubCutaneous three times a day before meals  insulin lispro (HumaLOG) corrective regimen sliding scale   SubCutaneous at bedtime  Medical Marijuana Oil 1 milliLiter(s),Medical Marijuana Oil 1 milliLiter(s) 1 milliLiter(s) Oral <User Schedule>  multivitamin/minerals 1 Tablet(s) Oral daily  petrolatum white Ointment 1 Application(s) Topical three times a day  predniSONE   Tablet   Oral   predniSONE   Tablet 7.5 milliGRAM(s) Oral daily  QUEtiapine 25 milliGRAM(s) Oral at bedtime  sodium chloride 3%  Inhalation 4 milliLiter(s) Inhalation every 6 hours  tiotropium 18 MICROgram(s) Capsule 1 Capsule(s) Inhalation daily  tiZANidine 4 milliGRAM(s) Oral <User Schedule>  vancomycin  IVPB 1000 milliGRAM(s) IV Intermittent every 24 hours    MEDICATIONS  (PRN):  acetaminophen    Suspension .. 650 milliGRAM(s) Oral every 6 hours PRN Temp greater or equal to 38C (100.4F), Mild Pain (1 - 3), Moderate Pain (4 - 6)  dextrose 40% Gel 15 Gram(s) Oral once PRN Blood Glucose LESS THAN 70 milliGRAM(s)/deciLiter  diphenhydrAMINE   Elixir 25 milliGRAM(s) Oral every 6 hours PRN Rash and/or Itching  glucagon  Injectable 1 milliGRAM(s) IntraMuscular once PRN Glucose <70 milliGRAM(s)/deciLiter  nystatin Powder 1 Application(s) Topical two times a day PRN rash/itchiness  sodium chloride 0.65% Nasal 1 Spray(s) Both Nostrils three times a day PRN Nasal Congestion      Allergies    ASA; dye contrast (Anaphylaxis)  aspirin (Short breath)  divalproex sodium (Other (Unknown))  Flowers and Plants (Short breath)  Haldol (Other (Unknown))  penicillin (Short breath; Rash)  sulfa drugs (Short breath; Rash)  vancomycin (Rash; Urticaria; Hives)  Xanax (Other (Unknown))    Intolerances            PHYSICAL EXAM:   Vital Signs:  Vital Signs Last 24 Hrs  T(C): 36.6 (2019 06:26), Max: 37.1 (2019 02:37)  T(F): 97.9 (2019 06:26), Max: 98.7 (2019 02:37)  HR: 101 (2019 06:26) (94 - 107)  BP: 131/69 (2019 06:26) (112/63 - 131/69)  BP(mean): --  RR: 20 (2019 06:26) (18 - 20)  SpO2: 96% (2019 06:26) (94% - 99%)  Daily     Daily     GENERAL:  no distress  HEENT:  NC/AT,  anicteric  CHEST:   no increased effort, breath sounds clear  HEART:  Regular rhythm  ABDOMEN:  Soft, non-tender, non-distended, normoactive bowel sounds,      large decub being covered      LABS:                        9.8    12.4  )-----------( 342      ( 2019 07:07 )             30.4     04-25    131<L>  |  91<L>  |  28<H>  ----------------------------<  152<H>  4.1   |  27  |  <0.30<L>    Ca    8.6      2019 07:06        Urinalysis Basic - ( 2019 05:44 )    Color: Light Yellow / Appearance: Clear / S.016 / pH: x  Gluc: x / Ketone: Negative  / Bili: Negative / Urobili: Negative   Blood: x / Protein: 30 mg/dL / Nitrite: Negative   Leuk Esterase: Small / RBC: 19 /hpf / WBC 23 /HPF   Sq Epi: x / Non Sq Epi: 1 / Bacteria: Negative        RADIOLOGY & ADDITIONAL TESTS:

## 2019-04-26 NOTE — PROGRESS NOTE ADULT - ASSESSMENT
Gram neg bacteremia, large saccral decub,   diarrhea with h/o UC on steroid taper  will increase lomotil to BID and follow  tube feeds as tolerates  will inquire re:details of UC history with family

## 2019-04-27 LAB
-  AMIKACIN: SIGNIFICANT CHANGE UP
-  AMPICILLIN/SULBACTAM: SIGNIFICANT CHANGE UP
-  AMPICILLIN: SIGNIFICANT CHANGE UP
-  AZTREONAM: SIGNIFICANT CHANGE UP
-  CEFAZOLIN: SIGNIFICANT CHANGE UP
-  CEFEPIME: SIGNIFICANT CHANGE UP
-  CEFOXITIN: SIGNIFICANT CHANGE UP
-  CEFTRIAXONE: SIGNIFICANT CHANGE UP
-  CIPROFLOXACIN: SIGNIFICANT CHANGE UP
-  COAGULASE NEGATIVE STAPHYLOCOCCUS: SIGNIFICANT CHANGE UP
-  ERTAPENEM: SIGNIFICANT CHANGE UP
-  GENTAMICIN: SIGNIFICANT CHANGE UP
-  IMIPENEM: SIGNIFICANT CHANGE UP
-  LEVOFLOXACIN: SIGNIFICANT CHANGE UP
-  MEROPENEM: SIGNIFICANT CHANGE UP
-  PIPERACILLIN/TAZOBACTAM: SIGNIFICANT CHANGE UP
-  TOBRAMYCIN: SIGNIFICANT CHANGE UP
-  TRIMETHOPRIM/SULFAMETHOXAZOLE: SIGNIFICANT CHANGE UP
ANION GAP SERPL CALC-SCNC: 10 MMOL/L — SIGNIFICANT CHANGE UP (ref 5–17)
BUN SERPL-MCNC: 27 MG/DL — HIGH (ref 7–23)
CALCIUM SERPL-MCNC: 8.5 MG/DL — SIGNIFICANT CHANGE UP (ref 8.4–10.5)
CHLORIDE SERPL-SCNC: 93 MMOL/L — LOW (ref 96–108)
CO2 SERPL-SCNC: 30 MMOL/L — SIGNIFICANT CHANGE UP (ref 22–31)
CREAT SERPL-MCNC: <0.3 MG/DL — LOW (ref 0.5–1.3)
CULTURE RESULTS: SIGNIFICANT CHANGE UP
CULTURE RESULTS: SIGNIFICANT CHANGE UP
GLUCOSE BLDC GLUCOMTR-MCNC: 111 MG/DL — HIGH (ref 70–99)
GLUCOSE BLDC GLUCOMTR-MCNC: 148 MG/DL — HIGH (ref 70–99)
GLUCOSE BLDC GLUCOMTR-MCNC: 227 MG/DL — HIGH (ref 70–99)
GLUCOSE BLDC GLUCOMTR-MCNC: 239 MG/DL — HIGH (ref 70–99)
GLUCOSE SERPL-MCNC: 141 MG/DL — HIGH (ref 70–99)
GRAM STN FLD: SIGNIFICANT CHANGE UP
HCT VFR BLD CALC: 27.5 % — LOW (ref 34.5–45)
HGB BLD-MCNC: 9 G/DL — LOW (ref 11.5–15.5)
MCHC RBC-ENTMCNC: 31.2 PG — SIGNIFICANT CHANGE UP (ref 27–34)
MCHC RBC-ENTMCNC: 32.9 GM/DL — SIGNIFICANT CHANGE UP (ref 32–36)
MCV RBC AUTO: 94.7 FL — SIGNIFICANT CHANGE UP (ref 80–100)
METHOD TYPE: SIGNIFICANT CHANGE UP
METHOD TYPE: SIGNIFICANT CHANGE UP
PLATELET # BLD AUTO: 368 K/UL — SIGNIFICANT CHANGE UP (ref 150–400)
POTASSIUM SERPL-MCNC: 3.8 MMOL/L — SIGNIFICANT CHANGE UP (ref 3.5–5.3)
POTASSIUM SERPL-SCNC: 3.8 MMOL/L — SIGNIFICANT CHANGE UP (ref 3.5–5.3)
RBC # BLD: 2.91 M/UL — LOW (ref 3.8–5.2)
RBC # FLD: 15.5 % — HIGH (ref 10.3–14.5)
SODIUM SERPL-SCNC: 133 MMOL/L — LOW (ref 135–145)
SPECIMEN SOURCE: SIGNIFICANT CHANGE UP
SPECIMEN SOURCE: SIGNIFICANT CHANGE UP
WBC # BLD: 9.5 K/UL — SIGNIFICANT CHANGE UP (ref 3.8–10.5)
WBC # FLD AUTO: 9.5 K/UL — SIGNIFICANT CHANGE UP (ref 3.8–10.5)

## 2019-04-27 PROCEDURE — 99232 SBSQ HOSP IP/OBS MODERATE 35: CPT

## 2019-04-27 RX ORDER — CLONAZEPAM 1 MG
1.5 TABLET ORAL
Qty: 0 | Refills: 0 | Status: DISCONTINUED | OUTPATIENT
Start: 2019-04-27 | End: 2019-05-04

## 2019-04-27 RX ORDER — MORPHINE 10 MG/ML
0.5 SOLUTION ORAL EVERY 6 HOURS
Qty: 0 | Refills: 0 | Status: DISCONTINUED | OUTPATIENT
Start: 2019-04-27 | End: 2019-04-27

## 2019-04-27 RX ORDER — MORPHINE 10 MG/ML
5 SOLUTION ORAL EVERY 6 HOURS
Qty: 0 | Refills: 0 | Status: DISCONTINUED | OUTPATIENT
Start: 2019-04-27 | End: 2019-04-27

## 2019-04-27 RX ADMIN — Medication 2: at 09:34

## 2019-04-27 RX ADMIN — Medication 3 MILLILITER(S): at 17:35

## 2019-04-27 RX ADMIN — SODIUM CHLORIDE 4 MILLILITER(S): 9 INJECTION INTRAMUSCULAR; INTRAVENOUS; SUBCUTANEOUS at 06:24

## 2019-04-27 RX ADMIN — QUETIAPINE FUMARATE 25 MILLIGRAM(S): 200 TABLET, FILM COATED ORAL at 22:16

## 2019-04-27 RX ADMIN — Medication 0.5 MILLIGRAM(S): at 17:35

## 2019-04-27 RX ADMIN — MEROPENEM 100 MILLIGRAM(S): 1 INJECTION INTRAVENOUS at 17:34

## 2019-04-27 RX ADMIN — Medication 3 MILLILITER(S): at 13:38

## 2019-04-27 RX ADMIN — FAMOTIDINE 20 MILLIGRAM(S): 10 INJECTION INTRAVENOUS at 09:34

## 2019-04-27 RX ADMIN — Medication 250 MILLIGRAM(S): at 09:38

## 2019-04-27 RX ADMIN — Medication 1 DROP(S): at 15:57

## 2019-04-27 RX ADMIN — Medication 1 TABLET(S): at 09:34

## 2019-04-27 RX ADMIN — Medication 2 TABLET(S): at 22:16

## 2019-04-27 RX ADMIN — Medication 1 DROP(S): at 22:15

## 2019-04-27 RX ADMIN — MEROPENEM 100 MILLIGRAM(S): 1 INJECTION INTRAVENOUS at 06:23

## 2019-04-27 RX ADMIN — APIXABAN 5 MILLIGRAM(S): 2.5 TABLET, FILM COATED ORAL at 06:23

## 2019-04-27 RX ADMIN — TIZANIDINE 4 MILLIGRAM(S): 4 TABLET ORAL at 22:16

## 2019-04-27 RX ADMIN — Medication 1 APPLICATION(S): at 06:33

## 2019-04-27 RX ADMIN — Medication 1 APPLICATION(S): at 13:37

## 2019-04-27 RX ADMIN — Medication 1 DROP(S): at 06:24

## 2019-04-27 RX ADMIN — CHLORHEXIDINE GLUCONATE 1 APPLICATION(S): 213 SOLUTION TOPICAL at 06:24

## 2019-04-27 RX ADMIN — SODIUM CHLORIDE 4 MILLILITER(S): 9 INJECTION INTRAMUSCULAR; INTRAVENOUS; SUBCUTANEOUS at 13:38

## 2019-04-27 RX ADMIN — Medication 1.5 MILLIGRAM(S): at 22:16

## 2019-04-27 RX ADMIN — SODIUM CHLORIDE 4 MILLILITER(S): 9 INJECTION INTRAMUSCULAR; INTRAVENOUS; SUBCUTANEOUS at 17:35

## 2019-04-27 RX ADMIN — Medication 7.5 MILLIGRAM(S): at 06:23

## 2019-04-27 RX ADMIN — TIZANIDINE 4 MILLIGRAM(S): 4 TABLET ORAL at 09:34

## 2019-04-27 RX ADMIN — Medication 2: at 13:37

## 2019-04-27 RX ADMIN — GABAPENTIN 300 MILLIGRAM(S): 400 CAPSULE ORAL at 22:16

## 2019-04-27 RX ADMIN — Medication 3 MILLILITER(S): at 06:24

## 2019-04-27 RX ADMIN — Medication 500 MILLIGRAM(S): at 09:34

## 2019-04-27 RX ADMIN — APIXABAN 5 MILLIGRAM(S): 2.5 TABLET, FILM COATED ORAL at 17:33

## 2019-04-27 RX ADMIN — GABAPENTIN 300 MILLIGRAM(S): 400 CAPSULE ORAL at 09:34

## 2019-04-27 RX ADMIN — Medication 25 MILLIGRAM(S): at 09:33

## 2019-04-27 RX ADMIN — Medication 300 MILLIGRAM(S): at 09:33

## 2019-04-27 RX ADMIN — Medication 0.5 MILLIGRAM(S): at 06:24

## 2019-04-27 RX ADMIN — Medication 2 TABLET(S): at 06:23

## 2019-04-27 RX ADMIN — Medication 1 APPLICATION(S): at 22:17

## 2019-04-27 NOTE — PROGRESS NOTE ADULT - SUBJECTIVE AND OBJECTIVE BOX
Follow Up:      Inverval History/ROS:Patient is a 68y old  Female who presents with a chief complaint of fevers (27 Apr 2019 10:22)    Afebrile  No events    Allergies    ASA; dye contrast (Anaphylaxis)  aspirin (Short breath)  divalproex sodium (Other (Unknown))  Flowers and Plants (Short breath)  Haldol (Other (Unknown))  penicillin (Short breath; Rash)  sulfa drugs (Short breath; Rash)  vancomycin (Rash; Urticaria; Hives)  Xanax (Other (Unknown))    Intolerances        ANTIMICROBIALS:  meropenem  IVPB 1000 every 12 hours  vancomycin  IVPB 1000 every 24 hours      OTHER MEDS:  acetaminophen    Suspension .. 650 milliGRAM(s) Oral every 6 hours PRN  ALBUTerol    90 MICROgram(s) HFA Inhaler 1 Puff(s) Inhalation every 4 hours  ALBUTerol/ipratropium for Nebulization 3 milliLiter(s) Nebulizer every 6 hours  apixaban 5 milliGRAM(s) Oral every 12 hours  artificial tears (preservative free) Ophthalmic Solution 1 Drop(s) Both EYES three times a day  ascorbic acid 500 milliGRAM(s) Oral daily  buDESOnide   0.5 milliGRAM(s) Respule 0.5 milliGRAM(s) Inhalation every 12 hours  chlorhexidine 4% Liquid 1 Application(s) Topical <User Schedule>  clonazePAM Tablet 1.5 milliGRAM(s) Oral <User Schedule>  dextrose 40% Gel 15 Gram(s) Oral once PRN  dextrose 5%. 1000 milliLiter(s) IV Continuous <Continuous>  dextrose 50% Injectable 12.5 Gram(s) IV Push once  dextrose 50% Injectable 25 Gram(s) IV Push once  dextrose 50% Injectable 25 Gram(s) IV Push once  diphenhydrAMINE   Elixir 25 milliGRAM(s) Oral every 6 hours PRN  diphenoxylate/atropine 2 Tablet(s) Oral two times a day  ergocalciferol 68504 Unit(s) Oral every week  famotidine    Tablet 20 milliGRAM(s) Oral daily  ferrous    sulfate Liquid 300 milliGRAM(s) Enteral Tube daily  gabapentin   Solution 300 milliGRAM(s) Oral two times a day  glucagon  Injectable 1 milliGRAM(s) IntraMuscular once PRN  insulin lispro (HumaLOG) corrective regimen sliding scale   SubCutaneous three times a day before meals  insulin lispro (HumaLOG) corrective regimen sliding scale   SubCutaneous at bedtime  Medical Marijuana Oil 1 milliLiter(s),Medical Marijuana Oil 1 milliLiter(s) 1 milliLiter(s) Oral <User Schedule>  multivitamin/minerals 1 Tablet(s) Oral daily  nystatin Powder 1 Application(s) Topical two times a day PRN  opium Tincture 5 milliGRAM(s) Oral every 6 hours  petrolatum white Ointment 1 Application(s) Topical three times a day  predniSONE   Tablet   Oral   predniSONE   Tablet 7.5 milliGRAM(s) Oral daily  QUEtiapine 25 milliGRAM(s) Oral at bedtime  sodium chloride 0.65% Nasal 1 Spray(s) Both Nostrils three times a day PRN  sodium chloride 3%  Inhalation 4 milliLiter(s) Inhalation every 6 hours  tiotropium 18 MICROgram(s) Capsule 1 Capsule(s) Inhalation daily  tiZANidine 4 milliGRAM(s) Oral <User Schedule>      Vital Signs Last 24 Hrs  T(C): 36.7 (27 Apr 2019 04:27), Max: 36.7 (27 Apr 2019 04:27)  T(F): 98 (27 Apr 2019 04:27), Max: 98 (27 Apr 2019 04:27)  HR: 98 (27 Apr 2019 04:27) (98 - 110)  BP: 113/77 (27 Apr 2019 04:27) (111/66 - 148/87)  BP(mean): --  RR: 18 (27 Apr 2019 04:27) (18 - 19)  SpO2: 98% (27 Apr 2019 04:27) (95% - 98%)    PHYSICAL EXAM:  General: [ ] non-toxic  HEAD/EYES: [ ] PERRL [x ] white sclera [ ] icterus  ENT:  [ ] normal [ x] supple [ ] thrush [ ] pharyngeal exudate  Cardiovascular:   [ ] murmur [x ] normal [ ] PPM/AICD  Respiratory:  [x ] clear to ausculation bilaterally  GI:  [x ] soft, non-tender, normal bowel sounds  :  [ ] gavin [ ] no CVA tenderness   Musculoskeletal:  [x ] no synovitis  Neurologic:  [ ] non-focal exam   Skin:  x[ ] no rash  Lymph: [x ] no lymphadenopathy  Psychiatric:  [ ] appropriate affect [ ] alert & oriented  Lines:  [ x] no phlebitis [ ] central line                                9.0    9.5   )-----------( 368      ( 27 Apr 2019 07:28 )             27.5       04-27    133<L>  |  93<L>  |  27<H>  ----------------------------<  141<H>  3.8   |  30  |  <0.30<L>    Ca    8.5      27 Apr 2019 07:28            MICROBIOLOGY:Culture Results:   50,000 - 99,000 CFU/mL Yeast-like cells, presumptively not Candida  albicans "Susceptibilities not performed" (04-25-19 @ 08:59)  Culture Results:   No growth to date. (04-25-19 @ 08:48)  Culture Results:   Growth in anaerobic bottle: Gram Negative Rods  Growth in aerobic bottle: Gram Positive Cocci in Clusters  "Due to technical problems, Proteus sp. will Not be reported as part of  the BCID panel until further notice"  ***Blood Panel PCR results on this specimen are available  approximately 3 hours after the Gram stain result.***  Gram stain, PCR, and/or culture results may not always  correspond due to difference in methodologies.  ************************************************************  This PCR assay was performed using [facility].  The following targets are tested for: Enterococcus,  vancomycin resistant enterococci, Listeria monocytogenes,  coagulase negative staphylococci, S. aureus,  methicillin resistant S. aureus, Streptococcus agalactiae  (Group B), S. pneumoniae, S. pyogenes (Group A),  Acinetobacter baumannii, Enterobacter cloacae, E. coli,  Klebsiella oxytoca, K. pneumoniae, Proteus sp.,  Serratia marcescens, Haemophilus influenzae,  Neisseria meningitidis, Pseudomonas aeruginosa, Candida  albicans, C. glabrata, C krusei, C parapsilosis,  C. tropicalis and the KPC resistance gene. (04-25-19 @ 08:48)  Culture Results:   No growth at 5 days. (04-22-19 @ 09:04)  Culture Results:   No growth at 5 days. (04-22-19 @ 09:04)  Culture Results:   50,000 - 99,000 CFU/mL Yeast-like cells, presumptively not Candida  albicans (04-22-19 @ 08:59)      RADIOLOGY:  < from: CT Abdomen and Pelvis w/ Oral Cont (04.26.19 @ 19:30) >  IMPRESSION: New multifocal pneumonia. Sacral decubitus ulcer again noted.    < end of copied text >

## 2019-04-27 NOTE — PROGRESS NOTE ADULT - SUBJECTIVE AND OBJECTIVE BOX
SUBJECTIVE / OVERNIGHT EVENTS: pt seen and examined     MEDICATIONS  (STANDING):  ALBUTerol    90 MICROgram(s) HFA Inhaler 1 Puff(s) Inhalation every 4 hours  ALBUTerol/ipratropium for Nebulization 3 milliLiter(s) Nebulizer every 6 hours  apixaban 5 milliGRAM(s) Oral every 12 hours  artificial tears (preservative free) Ophthalmic Solution 1 Drop(s) Both EYES three times a day  ascorbic acid 500 milliGRAM(s) Oral daily  buDESOnide   0.5 milliGRAM(s) Respule 0.5 milliGRAM(s) Inhalation every 12 hours  chlorhexidine 4% Liquid 1 Application(s) Topical <User Schedule>  clonazePAM Tablet 1.5 milliGRAM(s) Oral <User Schedule>  dextrose 5%. 1000 milliLiter(s) (50 mL/Hr) IV Continuous <Continuous>  dextrose 50% Injectable 12.5 Gram(s) IV Push once  dextrose 50% Injectable 25 Gram(s) IV Push once  dextrose 50% Injectable 25 Gram(s) IV Push once  diphenoxylate/atropine 2 Tablet(s) Oral two times a day  ergocalciferol 94722 Unit(s) Oral every week  famotidine    Tablet 20 milliGRAM(s) Oral daily  ferrous    sulfate Liquid 300 milliGRAM(s) Enteral Tube daily  gabapentin   Solution 300 milliGRAM(s) Oral two times a day  insulin lispro (HumaLOG) corrective regimen sliding scale   SubCutaneous three times a day before meals  insulin lispro (HumaLOG) corrective regimen sliding scale   SubCutaneous at bedtime  Medical Marijuana Oil 1 milliLiter(s),Medical Marijuana Oil 1 milliLiter(s) 1 milliLiter(s) Oral <User Schedule>  meropenem  IVPB 1000 milliGRAM(s) IV Intermittent every 12 hours  multivitamin/minerals 1 Tablet(s) Oral daily  petrolatum white Ointment 1 Application(s) Topical three times a day  predniSONE   Tablet   Oral   predniSONE   Tablet 7.5 milliGRAM(s) Oral daily  QUEtiapine 25 milliGRAM(s) Oral at bedtime  sodium chloride 3%  Inhalation 4 milliLiter(s) Inhalation every 6 hours  tiotropium 18 MICROgram(s) Capsule 1 Capsule(s) Inhalation daily  tiZANidine 4 milliGRAM(s) Oral <User Schedule>  vancomycin  IVPB 1000 milliGRAM(s) IV Intermittent every 24 hours    MEDICATIONS  (PRN):  acetaminophen    Suspension .. 650 milliGRAM(s) Oral every 6 hours PRN Temp greater or equal to 38C (100.4F), Mild Pain (1 - 3), Moderate Pain (4 - 6)  dextrose 40% Gel 15 Gram(s) Oral once PRN Blood Glucose LESS THAN 70 milliGRAM(s)/deciLiter  diphenhydrAMINE   Elixir 25 milliGRAM(s) Oral every 6 hours PRN Rash and/or Itching  glucagon  Injectable 1 milliGRAM(s) IntraMuscular once PRN Glucose <70 milliGRAM(s)/deciLiter  nystatin Powder 1 Application(s) Topical two times a day PRN rash/itchiness  sodium chloride 0.65% Nasal 1 Spray(s) Both Nostrils three times a day PRN Nasal Congestion    Vital Signs Last 24 Hrs  T(C): 36.7 (2019 12:51), Max: 36.7 (2019 04:27)  T(F): 98 (2019 12:51), Max: 98 (2019 04:27)  HR: 95 (2019 12:51) (95 - 110)  BP: 115/72 (2019 12:51) (111/66 - 148/87)  BP(mean): --  RR: 18 (2019 12:51) (18 - 19)  SpO2: 100% (2019 12:51) (95% - 100%)      unable to obtain ros from pt sec to pts cognitive status    PHYSICAL EXAM:  GENERAL: NAD  EYES: EOMI, PERRLA  NECK: Supple, No JVD  CHEST/LUNG: dec breath sounds at bases , no wheezing   HEART:  S1 , S2 +  ABDOMEN: soft , bs+  EXTREMITIES:  no edema    LABS:      133<L>  |  93<L>  |  27<H>  ----------------------------<  141<H>  3.8   |  30  |  <0.30<L>    Ca    8.5      2019 07:28      Creatinine Trend: <0.30 <--, <0.30 <--, <0.30 <--, <0.30 <--, <0.30 <--, <0.30 <--, <0.30 <--                        9.0    9.5   )-----------( 368      ( 2019 07:28 )             27.5     Urine Studies:  Urinalysis Basic - ( 2019 05:44 )    Color: Light Yellow / Appearance: Clear / S.016 / pH:   Gluc:  / Ketone: Negative  / Bili: Negative / Urobili: Negative   Blood:  / Protein: 30 mg/dL / Nitrite: Negative   Leuk Esterase: Small / RBC: 19 /hpf / WBC 23 /HPF   Sq Epi:  / Non Sq Epi: 1 / Bacteria: Negative                                                          RADIOLOGY & ADDITIONAL TESTS:    Imaging Personally Reviewed:    Consultant(s) Notes Reviewed:      Care Discussed with Consultants/Other Providers:

## 2019-04-27 NOTE — PROGRESS NOTE ADULT - ASSESSMENT
68 yr old with advanced dementia , bed bound, contracted, cva, sp removal of infected hip due to mrsa with placement of space last fall.    Pt has been having fevers for months  1)  Osteomyeltitis of the hip site but changing the spacer is not a realistic option--On Vanco coverage  2) Chronic OM of the sacrum usually does not cause fevers and does not need prolonged ab therapy  PICC line changed 4/18  3) leukocytosis, intermittent fevers  CXR with ? PNA  UA equivocal - culture with yeast-likely colonizer    BCX now with K pne and coag negative staph    Ct chest with new multifocal pna      Overall, new K pne bacteremia, MRSA joint infection, leukocytosis, fever, PCN allergy  -? due to PNA  - Meropenem 1g q 12 (CrCl 49)  - Vanco 1g q 24, monitor trough  - repeat BCX    Prognosis guarded

## 2019-04-27 NOTE — PROGRESS NOTE ADULT - SUBJECTIVE AND OBJECTIVE BOX
MYRIAM WHITE:[id number],   68yFemale followed for:  ASA; dye contrast (Anaphylaxis)  aspirin (Short breath)  divalproex sodium (Other (Unknown))  Flowers and Plants (Short breath)  Haldol (Other (Unknown))  penicillin (Short breath; Rash)  sulfa drugs (Short breath; Rash)  vancomycin (Rash; Urticaria; Hives)  Xanax (Other (Unknown))    PAST MEDICAL & SURGICAL HISTORY:  CVA (cerebral vascular accident)  Fatty pancreas  PNA (pneumonia)  Pulmonary HTN  IGT (impaired glucose tolerance)  Ulcerative colitis  Acid reflux  Anxiety  Depression  Mouth sores  HLD (hyperlipidemia)  Asthma  Humeral head fracture  H/O: hysterectomy  H/O cataract extraction, left  History of knee replacement    FAMILY HISTORY:  Family history of dementia (Grandparent)  Family history of colon cancer (Grandparent)    MEDICATIONS  (STANDING):  ALBUTerol    90 MICROgram(s) HFA Inhaler 1 Puff(s) Inhalation every 4 hours  ALBUTerol/ipratropium for Nebulization 3 milliLiter(s) Nebulizer every 6 hours  apixaban 5 milliGRAM(s) Oral every 12 hours  artificial tears (preservative free) Ophthalmic Solution 1 Drop(s) Both EYES three times a day  ascorbic acid 500 milliGRAM(s) Oral daily  buDESOnide   0.5 milliGRAM(s) Respule 0.5 milliGRAM(s) Inhalation every 12 hours  chlorhexidine 4% Liquid 1 Application(s) Topical <User Schedule>  clonazePAM Tablet 1.5 milliGRAM(s) Oral <User Schedule>  dextrose 5%. 1000 milliLiter(s) (50 mL/Hr) IV Continuous <Continuous>  dextrose 50% Injectable 12.5 Gram(s) IV Push once  dextrose 50% Injectable 25 Gram(s) IV Push once  dextrose 50% Injectable 25 Gram(s) IV Push once  diphenoxylate/atropine 2 Tablet(s) Oral two times a day  ergocalciferol 25359 Unit(s) Oral every week  famotidine    Tablet 20 milliGRAM(s) Oral daily  ferrous    sulfate Liquid 300 milliGRAM(s) Enteral Tube daily  gabapentin   Solution 300 milliGRAM(s) Oral two times a day  insulin lispro (HumaLOG) corrective regimen sliding scale   SubCutaneous three times a day before meals  insulin lispro (HumaLOG) corrective regimen sliding scale   SubCutaneous at bedtime  Medical Marijuana Oil 1 milliLiter(s),Medical Marijuana Oil 1 milliLiter(s) 1 milliLiter(s) Oral <User Schedule>  meropenem  IVPB 1000 milliGRAM(s) IV Intermittent every 12 hours  multivitamin/minerals 1 Tablet(s) Oral daily  petrolatum white Ointment 1 Application(s) Topical three times a day  predniSONE   Tablet   Oral   predniSONE   Tablet 7.5 milliGRAM(s) Oral daily  QUEtiapine 25 milliGRAM(s) Oral at bedtime  sodium chloride 3%  Inhalation 4 milliLiter(s) Inhalation every 6 hours  tiotropium 18 MICROgram(s) Capsule 1 Capsule(s) Inhalation daily  tiZANidine 4 milliGRAM(s) Oral <User Schedule>  vancomycin  IVPB 1000 milliGRAM(s) IV Intermittent every 24 hours    MEDICATIONS  (PRN):  acetaminophen    Suspension .. 650 milliGRAM(s) Oral every 6 hours PRN Temp greater or equal to 38C (100.4F), Mild Pain (1 - 3), Moderate Pain (4 - 6)  dextrose 40% Gel 15 Gram(s) Oral once PRN Blood Glucose LESS THAN 70 milliGRAM(s)/deciLiter  diphenhydrAMINE   Elixir 25 milliGRAM(s) Oral every 6 hours PRN Rash and/or Itching  glucagon  Injectable 1 milliGRAM(s) IntraMuscular once PRN Glucose <70 milliGRAM(s)/deciLiter  nystatin Powder 1 Application(s) Topical two times a day PRN rash/itchiness  sodium chloride 0.65% Nasal 1 Spray(s) Both Nostrils three times a day PRN Nasal Congestion      Vital Signs Last 24 Hrs  T(C): 36.7 (27 Apr 2019 04:27), Max: 36.7 (27 Apr 2019 04:27)  T(F): 98 (27 Apr 2019 04:27), Max: 98 (27 Apr 2019 04:27)  HR: 98 (27 Apr 2019 04:27) (98 - 110)  BP: 113/77 (27 Apr 2019 04:27) (111/66 - 148/87)  BP(mean): --  RR: 18 (27 Apr 2019 04:27) (18 - 19)  SpO2: 98% (27 Apr 2019 04:27) (95% - 98%)  nc/at  s1s2  cta  soft, nt, nd no guarding or rebound  no c/c/e    CBC Full  -  ( 27 Apr 2019 07:28 )  WBC Count : 9.5 K/uL  RBC Count : 2.91 M/uL  Hemoglobin : 9.0 g/dL  Hematocrit : 27.5 %  Platelet Count - Automated : 368 K/uL  Mean Cell Volume : 94.7 fl  Mean Cell Hemoglobin : 31.2 pg  Mean Cell Hemoglobin Concentration : 32.9 gm/dL  Auto Neutrophil # : x  Auto Lymphocyte # : x  Auto Monocyte # : x  Auto Eosinophil # : x  Auto Basophil # : x  Auto Neutrophil % : x  Auto Lymphocyte % : x  Auto Monocyte % : x  Auto Eosinophil % : x  Auto Basophil % : x    04-27    133<L>  |  93<L>  |  27<H>  ----------------------------<  141<H>  3.8   |  30  |  <0.30<L>    Ca    8.5      27 Apr 2019 07:28

## 2019-04-27 NOTE — CHART NOTE - NSCHARTNOTEFT_GEN_A_CORE
GI recommending for Tincture of Opium , how ever  would like to Hold the Opium . will follow up with DR jasmine d/c Opium , will follow up with DR Carcamo .   Pt  with  positive blood c/s  gram negative rods  in anaerobic bottle and gram positive cocci in aerobic bottle . ID notes  c/w Vanco and Meropenem  f/u Repeat blood c/s   anil call NP-C [phone]

## 2019-04-28 LAB
ANION GAP SERPL CALC-SCNC: 10 MMOL/L — SIGNIFICANT CHANGE UP (ref 5–17)
BASOPHILS # BLD AUTO: 0 K/UL — SIGNIFICANT CHANGE UP (ref 0–0.2)
BASOPHILS NFR BLD AUTO: 0.3 % — SIGNIFICANT CHANGE UP (ref 0–2)
BUN SERPL-MCNC: 23 MG/DL — SIGNIFICANT CHANGE UP (ref 7–23)
CALCIUM SERPL-MCNC: 8.6 MG/DL — SIGNIFICANT CHANGE UP (ref 8.4–10.5)
CHLORIDE SERPL-SCNC: 94 MMOL/L — LOW (ref 96–108)
CO2 SERPL-SCNC: 30 MMOL/L — SIGNIFICANT CHANGE UP (ref 22–31)
CREAT SERPL-MCNC: <0.3 MG/DL — LOW (ref 0.5–1.3)
CULTURE RESULTS: SIGNIFICANT CHANGE UP
EOSINOPHIL # BLD AUTO: 0.5 K/UL — SIGNIFICANT CHANGE UP (ref 0–0.5)
EOSINOPHIL NFR BLD AUTO: 3.5 % — SIGNIFICANT CHANGE UP (ref 0–6)
GLUCOSE BLDC GLUCOMTR-MCNC: 124 MG/DL — HIGH (ref 70–99)
GLUCOSE BLDC GLUCOMTR-MCNC: 132 MG/DL — HIGH (ref 70–99)
GLUCOSE BLDC GLUCOMTR-MCNC: 162 MG/DL — HIGH (ref 70–99)
GLUCOSE BLDC GLUCOMTR-MCNC: 246 MG/DL — HIGH (ref 70–99)
GLUCOSE SERPL-MCNC: 145 MG/DL — HIGH (ref 70–99)
HCT VFR BLD CALC: 28 % — LOW (ref 34.5–45)
HGB BLD-MCNC: 9.4 G/DL — LOW (ref 11.5–15.5)
LYMPHOCYTES # BLD AUTO: 1.1 K/UL — SIGNIFICANT CHANGE UP (ref 1–3.3)
LYMPHOCYTES # BLD AUTO: 7.7 % — LOW (ref 13–44)
MCHC RBC-ENTMCNC: 31.6 PG — SIGNIFICANT CHANGE UP (ref 27–34)
MCHC RBC-ENTMCNC: 33.5 GM/DL — SIGNIFICANT CHANGE UP (ref 32–36)
MCV RBC AUTO: 94.3 FL — SIGNIFICANT CHANGE UP (ref 80–100)
MONOCYTES # BLD AUTO: 0.7 K/UL — SIGNIFICANT CHANGE UP (ref 0–0.9)
MONOCYTES NFR BLD AUTO: 4.9 % — SIGNIFICANT CHANGE UP (ref 2–14)
NEUTROPHILS # BLD AUTO: 11.5 K/UL — HIGH (ref 1.8–7.4)
NEUTROPHILS NFR BLD AUTO: 83.7 % — HIGH (ref 43–77)
ORGANISM # SPEC MICROSCOPIC CNT: SIGNIFICANT CHANGE UP
PLATELET # BLD AUTO: 430 K/UL — HIGH (ref 150–400)
POTASSIUM SERPL-MCNC: 3.8 MMOL/L — SIGNIFICANT CHANGE UP (ref 3.5–5.3)
POTASSIUM SERPL-SCNC: 3.8 MMOL/L — SIGNIFICANT CHANGE UP (ref 3.5–5.3)
RBC # BLD: 2.97 M/UL — LOW (ref 3.8–5.2)
RBC # FLD: 15.5 % — HIGH (ref 10.3–14.5)
SODIUM SERPL-SCNC: 134 MMOL/L — LOW (ref 135–145)
SPECIMEN SOURCE: SIGNIFICANT CHANGE UP
WBC # BLD: 13.8 K/UL — HIGH (ref 3.8–10.5)
WBC # FLD AUTO: 13.8 K/UL — HIGH (ref 3.8–10.5)

## 2019-04-28 RX ADMIN — Medication 1 APPLICATION(S): at 22:26

## 2019-04-28 RX ADMIN — FAMOTIDINE 20 MILLIGRAM(S): 10 INJECTION INTRAVENOUS at 11:49

## 2019-04-28 RX ADMIN — Medication 1 DROP(S): at 13:30

## 2019-04-28 RX ADMIN — QUETIAPINE FUMARATE 25 MILLIGRAM(S): 200 TABLET, FILM COATED ORAL at 21:11

## 2019-04-28 RX ADMIN — APIXABAN 5 MILLIGRAM(S): 2.5 TABLET, FILM COATED ORAL at 06:39

## 2019-04-28 RX ADMIN — Medication 3 MILLILITER(S): at 23:44

## 2019-04-28 RX ADMIN — Medication 2 TABLET(S): at 17:24

## 2019-04-28 RX ADMIN — Medication 3 MILLILITER(S): at 00:10

## 2019-04-28 RX ADMIN — Medication 1 DROP(S): at 21:11

## 2019-04-28 RX ADMIN — Medication 2 TABLET(S): at 06:38

## 2019-04-28 RX ADMIN — Medication 1 TABLET(S): at 11:49

## 2019-04-28 RX ADMIN — GABAPENTIN 300 MILLIGRAM(S): 400 CAPSULE ORAL at 09:16

## 2019-04-28 RX ADMIN — CHLORHEXIDINE GLUCONATE 1 APPLICATION(S): 213 SOLUTION TOPICAL at 06:40

## 2019-04-28 RX ADMIN — GABAPENTIN 300 MILLIGRAM(S): 400 CAPSULE ORAL at 21:11

## 2019-04-28 RX ADMIN — Medication 500 MILLIGRAM(S): at 11:49

## 2019-04-28 RX ADMIN — Medication 3 MILLILITER(S): at 11:48

## 2019-04-28 RX ADMIN — SODIUM CHLORIDE 4 MILLILITER(S): 9 INJECTION INTRAMUSCULAR; INTRAVENOUS; SUBCUTANEOUS at 17:23

## 2019-04-28 RX ADMIN — MEROPENEM 100 MILLIGRAM(S): 1 INJECTION INTRAVENOUS at 17:23

## 2019-04-28 RX ADMIN — Medication 3 MILLILITER(S): at 17:24

## 2019-04-28 RX ADMIN — Medication 1 DROP(S): at 06:38

## 2019-04-28 RX ADMIN — Medication 250 MILLIGRAM(S): at 11:48

## 2019-04-28 RX ADMIN — SODIUM CHLORIDE 4 MILLILITER(S): 9 INJECTION INTRAMUSCULAR; INTRAVENOUS; SUBCUTANEOUS at 06:39

## 2019-04-28 RX ADMIN — Medication 25 MILLIGRAM(S): at 09:13

## 2019-04-28 RX ADMIN — SODIUM CHLORIDE 4 MILLILITER(S): 9 INJECTION INTRAMUSCULAR; INTRAVENOUS; SUBCUTANEOUS at 00:09

## 2019-04-28 RX ADMIN — Medication 300 MILLIGRAM(S): at 11:49

## 2019-04-28 RX ADMIN — Medication 2: at 09:14

## 2019-04-28 RX ADMIN — Medication 0.5 MILLIGRAM(S): at 17:23

## 2019-04-28 RX ADMIN — Medication 0.5 MILLIGRAM(S): at 06:39

## 2019-04-28 RX ADMIN — Medication 7.5 MILLIGRAM(S): at 06:39

## 2019-04-28 RX ADMIN — Medication 3 MILLILITER(S): at 06:40

## 2019-04-28 RX ADMIN — MEROPENEM 100 MILLIGRAM(S): 1 INJECTION INTRAVENOUS at 06:38

## 2019-04-28 RX ADMIN — TIZANIDINE 4 MILLIGRAM(S): 4 TABLET ORAL at 21:11

## 2019-04-28 RX ADMIN — Medication 1 APPLICATION(S): at 13:30

## 2019-04-28 RX ADMIN — SODIUM CHLORIDE 4 MILLILITER(S): 9 INJECTION INTRAMUSCULAR; INTRAVENOUS; SUBCUTANEOUS at 11:49

## 2019-04-28 RX ADMIN — TIZANIDINE 4 MILLIGRAM(S): 4 TABLET ORAL at 09:13

## 2019-04-28 RX ADMIN — Medication 1 APPLICATION(S): at 06:40

## 2019-04-28 RX ADMIN — APIXABAN 5 MILLIGRAM(S): 2.5 TABLET, FILM COATED ORAL at 17:24

## 2019-04-28 RX ADMIN — Medication 1.5 MILLIGRAM(S): at 21:11

## 2019-04-28 NOTE — PROGRESS NOTE ADULT - SUBJECTIVE AND OBJECTIVE BOX
MYRIAM WHITE:[id number],   68yFemale followed for:  ASA; dye contrast (Anaphylaxis)  aspirin (Short breath)  divalproex sodium (Other (Unknown))  Flowers and Plants (Short breath)  Haldol (Other (Unknown))  penicillin (Short breath; Rash)  sulfa drugs (Short breath; Rash)  vancomycin (Rash; Urticaria; Hives)  Xanax (Other (Unknown))    PAST MEDICAL & SURGICAL HISTORY:  CVA (cerebral vascular accident)  Fatty pancreas  PNA (pneumonia)  Pulmonary HTN  IGT (impaired glucose tolerance)  Ulcerative colitis  Acid reflux  Anxiety  Depression  Mouth sores  HLD (hyperlipidemia)  Asthma  Humeral head fracture  H/O: hysterectomy  H/O cataract extraction, left  History of knee replacement    FAMILY HISTORY:  Family history of dementia (Grandparent)  Family history of colon cancer (Grandparent)    MEDICATIONS  (STANDING):  ALBUTerol    90 MICROgram(s) HFA Inhaler 1 Puff(s) Inhalation every 4 hours  ALBUTerol/ipratropium for Nebulization 3 milliLiter(s) Nebulizer every 6 hours  apixaban 5 milliGRAM(s) Oral every 12 hours  artificial tears (preservative free) Ophthalmic Solution 1 Drop(s) Both EYES three times a day  ascorbic acid 500 milliGRAM(s) Oral daily  buDESOnide   0.5 milliGRAM(s) Respule 0.5 milliGRAM(s) Inhalation every 12 hours  chlorhexidine 4% Liquid 1 Application(s) Topical <User Schedule>  clonazePAM Tablet 1.5 milliGRAM(s) Oral <User Schedule>  dextrose 5%. 1000 milliLiter(s) (50 mL/Hr) IV Continuous <Continuous>  dextrose 50% Injectable 12.5 Gram(s) IV Push once  dextrose 50% Injectable 25 Gram(s) IV Push once  dextrose 50% Injectable 25 Gram(s) IV Push once  diphenoxylate/atropine 2 Tablet(s) Oral two times a day  ergocalciferol 05986 Unit(s) Oral every week  famotidine    Tablet 20 milliGRAM(s) Oral daily  ferrous    sulfate Liquid 300 milliGRAM(s) Enteral Tube daily  gabapentin   Solution 300 milliGRAM(s) Oral two times a day  insulin lispro (HumaLOG) corrective regimen sliding scale   SubCutaneous three times a day before meals  insulin lispro (HumaLOG) corrective regimen sliding scale   SubCutaneous at bedtime  Medical Marijuana Oil 1 milliLiter(s),Medical Marijuana Oil 1 milliLiter(s) 1 milliLiter(s) Oral <User Schedule>  meropenem  IVPB 1000 milliGRAM(s) IV Intermittent every 12 hours  multivitamin/minerals 1 Tablet(s) Oral daily  petrolatum white Ointment 1 Application(s) Topical three times a day  predniSONE   Tablet   Oral   predniSONE   Tablet 7.5 milliGRAM(s) Oral daily  QUEtiapine 25 milliGRAM(s) Oral at bedtime  sodium chloride 3%  Inhalation 4 milliLiter(s) Inhalation every 6 hours  tiotropium 18 MICROgram(s) Capsule 1 Capsule(s) Inhalation daily  tiZANidine 4 milliGRAM(s) Oral <User Schedule>  vancomycin  IVPB 1000 milliGRAM(s) IV Intermittent every 24 hours    MEDICATIONS  (PRN):  acetaminophen    Suspension .. 650 milliGRAM(s) Oral every 6 hours PRN Temp greater or equal to 38C (100.4F), Mild Pain (1 - 3), Moderate Pain (4 - 6)  dextrose 40% Gel 15 Gram(s) Oral once PRN Blood Glucose LESS THAN 70 milliGRAM(s)/deciLiter  diphenhydrAMINE   Elixir 25 milliGRAM(s) Oral every 6 hours PRN Rash and/or Itching  glucagon  Injectable 1 milliGRAM(s) IntraMuscular once PRN Glucose <70 milliGRAM(s)/deciLiter  nystatin Powder 1 Application(s) Topical two times a day PRN rash/itchiness  sodium chloride 0.65% Nasal 1 Spray(s) Both Nostrils three times a day PRN Nasal Congestion      Vital Signs Last 24 Hrs  T(C): 36.9 (27 Apr 2019 21:09), Max: 36.9 (27 Apr 2019 21:09)  T(F): 98.4 (27 Apr 2019 21:09), Max: 98.4 (27 Apr 2019 21:09)  HR: 102 (27 Apr 2019 21:09) (95 - 102)  BP: 134/77 (27 Apr 2019 21:09) (115/72 - 134/77)  BP(mean): --  RR: 18 (27 Apr 2019 21:09) (18 - 18)  SpO2: 96% (27 Apr 2019 21:09) (96% - 100%)  nc/at  s1s2  cta  soft, nt, nd no guarding or rebound  no c/c/e    CBC Full  -  ( 28 Apr 2019 07:20 )  WBC Count : 13.8 K/uL  RBC Count : 2.97 M/uL  Hemoglobin : 9.4 g/dL  Hematocrit : 28.0 %  Platelet Count - Automated : 430 K/uL  Mean Cell Volume : 94.3 fl  Mean Cell Hemoglobin : 31.6 pg  Mean Cell Hemoglobin Concentration : 33.5 gm/dL  Auto Neutrophil # : 11.5 K/uL  Auto Lymphocyte # : 1.1 K/uL  Auto Monocyte # : 0.7 K/uL  Auto Eosinophil # : 0.5 K/uL  Auto Basophil # : 0.0 K/uL  Auto Neutrophil % : 83.7 %  Auto Lymphocyte % : 7.7 %  Auto Monocyte % : 4.9 %  Auto Eosinophil % : 3.5 %  Auto Basophil % : 0.3 %    04-28    134<L>  |  94<L>  |  23  ----------------------------<  145<H>  3.8   |  30  |  <0.30<L>    Ca    8.6      28 Apr 2019 07:20

## 2019-04-28 NOTE — PROGRESS NOTE ADULT - SUBJECTIVE AND OBJECTIVE BOX
SUBJECTIVE / OVERNIGHT EVENTS: pt seen and examined     MEDICATIONS  (STANDING):  ALBUTerol    90 MICROgram(s) HFA Inhaler 1 Puff(s) Inhalation every 4 hours  ALBUTerol/ipratropium for Nebulization 3 milliLiter(s) Nebulizer every 6 hours  apixaban 5 milliGRAM(s) Oral every 12 hours  artificial tears (preservative free) Ophthalmic Solution 1 Drop(s) Both EYES three times a day  ascorbic acid 500 milliGRAM(s) Oral daily  buDESOnide   0.5 milliGRAM(s) Respule 0.5 milliGRAM(s) Inhalation every 12 hours  chlorhexidine 4% Liquid 1 Application(s) Topical <User Schedule>  clonazePAM Tablet 1.5 milliGRAM(s) Oral <User Schedule>  dextrose 5%. 1000 milliLiter(s) (50 mL/Hr) IV Continuous <Continuous>  dextrose 50% Injectable 12.5 Gram(s) IV Push once  dextrose 50% Injectable 25 Gram(s) IV Push once  dextrose 50% Injectable 25 Gram(s) IV Push once  diphenoxylate/atropine 2 Tablet(s) Oral two times a day  ergocalciferol 64170 Unit(s) Oral every week  famotidine    Tablet 20 milliGRAM(s) Oral daily  ferrous    sulfate Liquid 300 milliGRAM(s) Enteral Tube daily  gabapentin   Solution 300 milliGRAM(s) Oral two times a day  insulin lispro (HumaLOG) corrective regimen sliding scale   SubCutaneous three times a day before meals  insulin lispro (HumaLOG) corrective regimen sliding scale   SubCutaneous at bedtime  Medical Marijuana Oil 1 milliLiter(s),Medical Marijuana Oil 1 milliLiter(s) 1 milliLiter(s) Oral <User Schedule>  meropenem  IVPB 1000 milliGRAM(s) IV Intermittent every 12 hours  multivitamin/minerals 1 Tablet(s) Oral daily  petrolatum white Ointment 1 Application(s) Topical three times a day  predniSONE   Tablet   Oral   predniSONE   Tablet 7.5 milliGRAM(s) Oral daily  QUEtiapine 25 milliGRAM(s) Oral at bedtime  sodium chloride 3%  Inhalation 4 milliLiter(s) Inhalation every 6 hours  tiotropium 18 MICROgram(s) Capsule 1 Capsule(s) Inhalation daily  tiZANidine 4 milliGRAM(s) Oral <User Schedule>  vancomycin  IVPB 1000 milliGRAM(s) IV Intermittent every 24 hours    MEDICATIONS  (PRN):  acetaminophen    Suspension .. 650 milliGRAM(s) Oral every 6 hours PRN Temp greater or equal to 38C (100.4F), Mild Pain (1 - 3), Moderate Pain (4 - 6)  dextrose 40% Gel 15 Gram(s) Oral once PRN Blood Glucose LESS THAN 70 milliGRAM(s)/deciLiter  diphenhydrAMINE   Elixir 25 milliGRAM(s) Oral every 6 hours PRN Rash and/or Itching  glucagon  Injectable 1 milliGRAM(s) IntraMuscular once PRN Glucose <70 milliGRAM(s)/deciLiter  nystatin Powder 1 Application(s) Topical two times a day PRN rash/itchiness  sodium chloride 0.65% Nasal 1 Spray(s) Both Nostrils three times a day PRN Nasal Congestion    Vital Signs Last 24 Hrs  T(C): 37.5 (2019 21:00), Max: 37.5 (2019 21:00)  T(F): 99.5 (2019 21:00), Max: 99.5 (2019 21:00)  HR: 117 (2019 20:41) (117 - 117)  BP: 115/73 (2019 20:41) (115/73 - 115/73)  BP(mean): --  RR: 18 (2019 20:41) (18 - 18)  SpO2: 92% (2019 20:41) (92% - 92%)    unable to obtain ros from pt sec to pts cognitive status    PHYSICAL EXAM:  GENERAL: NAD  EYES: EOMI, PERRLA  NECK: Supple, No JVD  CHEST/LUNG: dec breath sounds at bases , no wheezing   HEART:  S1 , S2 +  ABDOMEN: soft , bs+  EXTREMITIES:  no edema    LABS:      134<L>  |  94<L>  |  23  ----------------------------<  145<H>  3.8   |  30  |  <0.30<L>    Ca    8.6      2019 07:20      Creatinine Trend: <0.30 <--, <0.30 <--, <0.30 <--, <0.30 <--, <0.30 <--, <0.30 <--, <0.30 <--                        9.4    13.8  )-----------( 430      ( 2019 07:20 )             28.0     Urine Studies:  Urinalysis Basic - ( 2019 05:44 )    Color: Light Yellow / Appearance: Clear / S.016 / pH:   Gluc:  / Ketone: Negative  / Bili: Negative / Urobili: Negative   Blood:  / Protein: 30 mg/dL / Nitrite: Negative   Leuk Esterase: Small / RBC: 19 /hpf / WBC 23 /HPF   Sq Epi:  / Non Sq Epi: 1 / Bacteria: Negative                                                                    RADIOLOGY & ADDITIONAL TESTS:    Imaging Personally Reviewed:    Consultant(s) Notes Reviewed:      Care Discussed with Consultants/Other Providers:

## 2019-04-29 LAB
ANION GAP SERPL CALC-SCNC: 11 MMOL/L — SIGNIFICANT CHANGE UP (ref 5–17)
BASOPHILS # BLD AUTO: 0 K/UL — SIGNIFICANT CHANGE UP (ref 0–0.2)
BUN SERPL-MCNC: 24 MG/DL — HIGH (ref 7–23)
CALCIUM SERPL-MCNC: 8.7 MG/DL — SIGNIFICANT CHANGE UP (ref 8.4–10.5)
CHLORIDE SERPL-SCNC: 92 MMOL/L — LOW (ref 96–108)
CO2 SERPL-SCNC: 30 MMOL/L — SIGNIFICANT CHANGE UP (ref 22–31)
CREAT SERPL-MCNC: <0.3 MG/DL — LOW (ref 0.5–1.3)
EOSINOPHIL # BLD AUTO: 0.4 K/UL — SIGNIFICANT CHANGE UP (ref 0–0.5)
EOSINOPHIL NFR BLD AUTO: 5 % — SIGNIFICANT CHANGE UP (ref 0–6)
GLUCOSE BLDC GLUCOMTR-MCNC: 120 MG/DL — HIGH (ref 70–99)
GLUCOSE BLDC GLUCOMTR-MCNC: 124 MG/DL — HIGH (ref 70–99)
GLUCOSE BLDC GLUCOMTR-MCNC: 205 MG/DL — HIGH (ref 70–99)
GLUCOSE BLDC GLUCOMTR-MCNC: 208 MG/DL — HIGH (ref 70–99)
GLUCOSE BLDC GLUCOMTR-MCNC: 218 MG/DL — HIGH (ref 70–99)
GLUCOSE SERPL-MCNC: 143 MG/DL — HIGH (ref 70–99)
HCT VFR BLD CALC: 25.8 % — LOW (ref 34.5–45)
HGB BLD-MCNC: 9 G/DL — LOW (ref 11.5–15.5)
LYMPHOCYTES # BLD AUTO: 1.1 K/UL — SIGNIFICANT CHANGE UP (ref 1–3.3)
LYMPHOCYTES # BLD AUTO: 10 % — LOW (ref 13–44)
MCHC RBC-ENTMCNC: 32.9 PG — SIGNIFICANT CHANGE UP (ref 27–34)
MCHC RBC-ENTMCNC: 34.9 GM/DL — SIGNIFICANT CHANGE UP (ref 32–36)
MCV RBC AUTO: 94.2 FL — SIGNIFICANT CHANGE UP (ref 80–100)
MONOCYTES # BLD AUTO: 0.7 K/UL — SIGNIFICANT CHANGE UP (ref 0–0.9)
MONOCYTES NFR BLD AUTO: 6 % — SIGNIFICANT CHANGE UP (ref 2–14)
NEUTROPHILS # BLD AUTO: 8.7 K/UL — HIGH (ref 1.8–7.4)
NEUTROPHILS NFR BLD AUTO: 76 % — SIGNIFICANT CHANGE UP (ref 43–77)
PLATELET # BLD AUTO: 429 K/UL — HIGH (ref 150–400)
POTASSIUM SERPL-MCNC: 4.1 MMOL/L — SIGNIFICANT CHANGE UP (ref 3.5–5.3)
POTASSIUM SERPL-SCNC: 4.1 MMOL/L — SIGNIFICANT CHANGE UP (ref 3.5–5.3)
RBC # BLD: 2.73 M/UL — LOW (ref 3.8–5.2)
RBC # FLD: 15.6 % — HIGH (ref 10.3–14.5)
SODIUM SERPL-SCNC: 133 MMOL/L — LOW (ref 135–145)
WBC # BLD: 10.9 K/UL — HIGH (ref 3.8–10.5)
WBC # FLD AUTO: 10.9 K/UL — HIGH (ref 3.8–10.5)

## 2019-04-29 PROCEDURE — 99232 SBSQ HOSP IP/OBS MODERATE 35: CPT

## 2019-04-29 RX ORDER — CEFTRIAXONE 500 MG/1
1 INJECTION, POWDER, FOR SOLUTION INTRAMUSCULAR; INTRAVENOUS EVERY 24 HOURS
Qty: 0 | Refills: 0 | Status: DISCONTINUED | OUTPATIENT
Start: 2019-04-29 | End: 2019-05-01

## 2019-04-29 RX ADMIN — Medication 1.5 MILLIGRAM(S): at 22:40

## 2019-04-29 RX ADMIN — GABAPENTIN 300 MILLIGRAM(S): 400 CAPSULE ORAL at 10:16

## 2019-04-29 RX ADMIN — Medication 250 MILLIGRAM(S): at 11:56

## 2019-04-29 RX ADMIN — Medication 2 TABLET(S): at 18:41

## 2019-04-29 RX ADMIN — Medication 3 MILLILITER(S): at 23:40

## 2019-04-29 RX ADMIN — SODIUM CHLORIDE 4 MILLILITER(S): 9 INJECTION INTRAMUSCULAR; INTRAVENOUS; SUBCUTANEOUS at 12:21

## 2019-04-29 RX ADMIN — Medication 0.5 MILLIGRAM(S): at 18:29

## 2019-04-29 RX ADMIN — QUETIAPINE FUMARATE 25 MILLIGRAM(S): 200 TABLET, FILM COATED ORAL at 22:40

## 2019-04-29 RX ADMIN — Medication 3 MILLILITER(S): at 11:56

## 2019-04-29 RX ADMIN — Medication 1 APPLICATION(S): at 23:29

## 2019-04-29 RX ADMIN — SODIUM CHLORIDE 4 MILLILITER(S): 9 INJECTION INTRAMUSCULAR; INTRAVENOUS; SUBCUTANEOUS at 18:30

## 2019-04-29 RX ADMIN — Medication 25 MILLIGRAM(S): at 10:15

## 2019-04-29 RX ADMIN — GABAPENTIN 300 MILLIGRAM(S): 400 CAPSULE ORAL at 21:26

## 2019-04-29 RX ADMIN — Medication 1 TABLET(S): at 12:21

## 2019-04-29 RX ADMIN — APIXABAN 5 MILLIGRAM(S): 2.5 TABLET, FILM COATED ORAL at 18:29

## 2019-04-29 RX ADMIN — NYSTATIN CREAM 1 APPLICATION(S): 100000 CREAM TOPICAL at 06:35

## 2019-04-29 RX ADMIN — SODIUM CHLORIDE 4 MILLILITER(S): 9 INJECTION INTRAMUSCULAR; INTRAVENOUS; SUBCUTANEOUS at 06:17

## 2019-04-29 RX ADMIN — Medication 7.5 MILLIGRAM(S): at 06:16

## 2019-04-29 RX ADMIN — Medication 1 DROP(S): at 10:16

## 2019-04-29 RX ADMIN — Medication 2: at 14:02

## 2019-04-29 RX ADMIN — Medication 2: at 10:19

## 2019-04-29 RX ADMIN — Medication 3 MILLILITER(S): at 18:28

## 2019-04-29 RX ADMIN — Medication 1 APPLICATION(S): at 14:03

## 2019-04-29 RX ADMIN — Medication 300 MILLIGRAM(S): at 12:21

## 2019-04-29 RX ADMIN — TIZANIDINE 4 MILLIGRAM(S): 4 TABLET ORAL at 22:40

## 2019-04-29 RX ADMIN — APIXABAN 5 MILLIGRAM(S): 2.5 TABLET, FILM COATED ORAL at 06:16

## 2019-04-29 RX ADMIN — Medication 500 MILLIGRAM(S): at 10:15

## 2019-04-29 RX ADMIN — Medication 1 APPLICATION(S): at 06:34

## 2019-04-29 RX ADMIN — MEROPENEM 100 MILLIGRAM(S): 1 INJECTION INTRAVENOUS at 06:18

## 2019-04-29 RX ADMIN — Medication 3 MILLILITER(S): at 06:16

## 2019-04-29 RX ADMIN — TIZANIDINE 4 MILLIGRAM(S): 4 TABLET ORAL at 10:15

## 2019-04-29 RX ADMIN — Medication 2 TABLET(S): at 06:16

## 2019-04-29 RX ADMIN — CEFTRIAXONE 100 GRAM(S): 500 INJECTION, POWDER, FOR SOLUTION INTRAMUSCULAR; INTRAVENOUS at 18:28

## 2019-04-29 RX ADMIN — CHLORHEXIDINE GLUCONATE 1 APPLICATION(S): 213 SOLUTION TOPICAL at 10:16

## 2019-04-29 RX ADMIN — Medication 0.5 MILLIGRAM(S): at 06:16

## 2019-04-29 RX ADMIN — SODIUM CHLORIDE 4 MILLILITER(S): 9 INJECTION INTRAMUSCULAR; INTRAVENOUS; SUBCUTANEOUS at 23:40

## 2019-04-29 RX ADMIN — Medication 1 DROP(S): at 22:40

## 2019-04-29 RX ADMIN — SODIUM CHLORIDE 4 MILLILITER(S): 9 INJECTION INTRAMUSCULAR; INTRAVENOUS; SUBCUTANEOUS at 00:38

## 2019-04-29 RX ADMIN — FAMOTIDINE 20 MILLIGRAM(S): 10 INJECTION INTRAVENOUS at 12:21

## 2019-04-29 RX ADMIN — Medication 1 DROP(S): at 14:02

## 2019-04-29 NOTE — PROGRESS NOTE ADULT - PROBLEM SELECTOR PLAN 4
stable at present

## 2019-04-29 NOTE — PROGRESS NOTE ADULT - SUBJECTIVE AND OBJECTIVE BOX
MYRIAM WHITE:[id number],   68yFemale followed for:  ASA; dye contrast (Anaphylaxis)  aspirin (Short breath)  divalproex sodium (Other (Unknown))  Flowers and Plants (Short breath)  Haldol (Other (Unknown))  penicillin (Short breath; Rash)  sulfa drugs (Short breath; Rash)  vancomycin (Rash; Urticaria; Hives)  Xanax (Other (Unknown))    PAST MEDICAL & SURGICAL HISTORY:  CVA (cerebral vascular accident)  Fatty pancreas  PNA (pneumonia)  Pulmonary HTN  IGT (impaired glucose tolerance)  Ulcerative colitis  Acid reflux  Anxiety  Depression  Mouth sores  HLD (hyperlipidemia)  Asthma  Humeral head fracture  H/O: hysterectomy  H/O cataract extraction, left  History of knee replacement    FAMILY HISTORY:  Family history of dementia (Grandparent)  Family history of colon cancer (Grandparent)    MEDICATIONS  (STANDING):  ALBUTerol    90 MICROgram(s) HFA Inhaler 1 Puff(s) Inhalation every 4 hours  ALBUTerol/ipratropium for Nebulization 3 milliLiter(s) Nebulizer every 6 hours  apixaban 5 milliGRAM(s) Oral every 12 hours  artificial tears (preservative free) Ophthalmic Solution 1 Drop(s) Both EYES three times a day  ascorbic acid 500 milliGRAM(s) Oral daily  buDESOnide   0.5 milliGRAM(s) Respule 0.5 milliGRAM(s) Inhalation every 12 hours  chlorhexidine 4% Liquid 1 Application(s) Topical <User Schedule>  clonazePAM Tablet 1.5 milliGRAM(s) Oral <User Schedule>  dextrose 5%. 1000 milliLiter(s) (50 mL/Hr) IV Continuous <Continuous>  dextrose 50% Injectable 12.5 Gram(s) IV Push once  dextrose 50% Injectable 25 Gram(s) IV Push once  dextrose 50% Injectable 25 Gram(s) IV Push once  diphenoxylate/atropine 2 Tablet(s) Oral two times a day  ergocalciferol 98784 Unit(s) Oral every week  famotidine    Tablet 20 milliGRAM(s) Oral daily  ferrous    sulfate Liquid 300 milliGRAM(s) Enteral Tube daily  gabapentin   Solution 300 milliGRAM(s) Oral two times a day  insulin lispro (HumaLOG) corrective regimen sliding scale   SubCutaneous three times a day before meals  insulin lispro (HumaLOG) corrective regimen sliding scale   SubCutaneous at bedtime  Medical Marijuana Oil 1 milliLiter(s),Medical Marijuana Oil 1 milliLiter(s) 1 milliLiter(s) Oral <User Schedule>  meropenem  IVPB 1000 milliGRAM(s) IV Intermittent every 12 hours  multivitamin/minerals 1 Tablet(s) Oral daily  petrolatum white Ointment 1 Application(s) Topical three times a day  predniSONE   Tablet   Oral   predniSONE   Tablet 7.5 milliGRAM(s) Oral daily  QUEtiapine 25 milliGRAM(s) Oral at bedtime  sodium chloride 3%  Inhalation 4 milliLiter(s) Inhalation every 6 hours  tiotropium 18 MICROgram(s) Capsule 1 Capsule(s) Inhalation daily  tiZANidine 4 milliGRAM(s) Oral <User Schedule>  vancomycin  IVPB 1000 milliGRAM(s) IV Intermittent every 24 hours    MEDICATIONS  (PRN):  acetaminophen    Suspension .. 650 milliGRAM(s) Oral every 6 hours PRN Temp greater or equal to 38C (100.4F), Mild Pain (1 - 3), Moderate Pain (4 - 6)  dextrose 40% Gel 15 Gram(s) Oral once PRN Blood Glucose LESS THAN 70 milliGRAM(s)/deciLiter  diphenhydrAMINE   Elixir 25 milliGRAM(s) Oral every 6 hours PRN Rash and/or Itching  glucagon  Injectable 1 milliGRAM(s) IntraMuscular once PRN Glucose <70 milliGRAM(s)/deciLiter  nystatin Powder 1 Application(s) Topical two times a day PRN rash/itchiness  sodium chloride 0.65% Nasal 1 Spray(s) Both Nostrils three times a day PRN Nasal Congestion      Vital Signs Last 24 Hrs  T(C): 36.6 (29 Apr 2019 06:40), Max: 37.5 (28 Apr 2019 21:00)  T(F): 97.9 (29 Apr 2019 06:40), Max: 99.5 (28 Apr 2019 21:00)  HR: 100 (29 Apr 2019 06:40) (100 - 117)  BP: 115/64 (29 Apr 2019 06:40) (115/64 - 122/76)  BP(mean): --  RR: 18 (29 Apr 2019 06:40) (18 - 18)  SpO2: 100% (29 Apr 2019 06:40) (92% - 100%)  nc/at  s1s2  cta  soft, nt, nd no guarding or rebound  no c/c/e    CBC Full  -  ( 29 Apr 2019 06:45 )  WBC Count : 10.9 K/uL  RBC Count : 2.73 M/uL  Hemoglobin : 9.0 g/dL  Hematocrit : 25.8 %  Platelet Count - Automated : 429 K/uL  Mean Cell Volume : 94.2 fl  Mean Cell Hemoglobin : 32.9 pg  Mean Cell Hemoglobin Concentration : 34.9 gm/dL  Auto Neutrophil # : 8.7 K/uL  Auto Lymphocyte # : 1.1 K/uL  Auto Monocyte # : 0.7 K/uL  Auto Eosinophil # : 0.4 K/uL  Auto Basophil # : 0.0 K/uL  Auto Neutrophil % : 76.0 %  Auto Lymphocyte % : 10.0 %  Auto Monocyte % : 6.0 %  Auto Eosinophil % : 5.0 %  Auto Basophil % : x    04-29    133<L>  |  92<L>  |  24<H>  ----------------------------<  143<H>  4.1   |  30  |  <0.30<L>    Ca    8.7      29 Apr 2019 06:41

## 2019-04-29 NOTE — PROGRESS NOTE ADULT - PROBLEM SELECTOR PLAN 6
supportive care   medical marijuana for chronic pain

## 2019-04-29 NOTE — PROGRESS NOTE ADULT - PROBLEM SELECTOR PLAN 1
appreciate ID input   PICC line was changed, UA with pyuria, may be  a source of recent fever, UCS with > 3 organisms- likely contaminant  Blood cultures negative to date    cont present abx , f/u vanc level  ortho f/u reviewed - no acute orthopedic intervention at present  pt spiked fever overnight , ID cleared pt for dc with abx to complete x 10 more days
appreciate ID input   PICC line was changed, UA with pyuria, may be  a source of recent fever, UCS with > 3 organisms- likely contaminant  Blood cultures negative to date    cont present abx , f/u vanc level  ortho f/u
appreciate ID input   PICC line was changed, UA with pyuria, may be  a source of recent fever, UCS with > 3 organisms- likely contaminant  Blood cultures negative to date    cont present abx , f/u vanc level  ortho f/u reviewed - no acute orthopedic intervention at present
appreciate ID input   PICC line was changed, UA with pyuria, may be  a source of recent fever, UCS with > 3 organisms- likely contaminant  Blood cultures negative to date    cont present abx , f/u vanc level  ortho f/u reviewed - no acute orthopedic intervention at present  pt spiked fever few days ago,   cxr with infiltrates - cx + for klebsiella- id changed abx ,
appreciate ID input   PICC line was changed, UA with pyuria, may be  a source of recent fever, UCS with > 3 organisms- likely contaminant  Blood cultures negative to date    cont present abx , f/u vanc level  ortho f/u reviewed - no acute orthopedic intervention at present  pt spiked fever overnight , ID cleared pt for dc with abx to complete x 10 more days  cxr with infiltrates - cx + for klebsiella- id changed abx , ct chest ordered
appreciate ID input   PICC line was changed, UA with pyuria, may be  a source of recent fever, UCS with > 3 organisms- likely contaminant  Blood cultures negative to date    cont present abx , f/u vanc level  ortho f/u reviewed - no acute orthopedic intervention at present  pt spiked fever few days ago,   cxr with infiltrates - cx + for klebsiella- id changed abx ,
appreciate ID input   PICC line was changed, UA with pyuria, may be  a source of recent fever, UCS with > 3 organisms- likely contaminant  Blood cultures negative to date    cont present abx , f/u vanc level  ortho f/u reviewed - no acute orthopedic intervention at present  pt spiked fever few days ago,   cxr with infiltrates - cx + for klebsiella- id changed abx ,

## 2019-04-29 NOTE — PROGRESS NOTE ADULT - PROBLEM SELECTOR PLAN 3
gi evaluated pt - started pt on lomotil
gi evaluated pt - started pt on lomotil
gi f/u
imodium, gi eval
imodium, gi f/u   as per  no change in pts bowel habits so far
gi f/u
gi f/u

## 2019-04-29 NOTE — PROGRESS NOTE ADULT - PROBLEM SELECTOR PLAN 1
I called and gave mom her appointment information for the nephrologist at [facility] because [facility] did not have a [facility] nephrologist anymore. She was scheduled for the first available time on 05/28/2019 but the pt. Will be out of town. I provided mom with the phone # so she could reschedule her appointment for a time that is better for her.   Goal glucose 100-180  Currently at or close to goal  Continue to monitor on NPH 5 units at bedtime only if she gets the prednisone.  Continue with correction scale q6 during the day on tube feeds.   Will adjust as needed. Goal glucose 100-180  Currently at or close to goal  Will increase NPH to 6 units at 12am  Continue with correction scale q6 during the day on tube feeds.   Will adjust as needed.

## 2019-04-29 NOTE — PROGRESS NOTE ADULT - ASSESSMENT
69 yo with advanced dementia , bed bound, contracted, cva, sp removal of infected hip due to mrsa with placement of space last fall  Pt has been having fevers for months  Suspected ongoing Om of the hip site but changing the spacer is not a realistic option--On Vanco coverage  Chronic Om of the sacrum usually does not cause fevers and does not need prolonged ab therapy  PICC line changed 4/18  Has leukocytosis, intermittent fevers  CXR with ? PNA  UA equivocal (UCX pending)  BCX now with K pne S pending  CT with multifocal pna  Source? Does not appears urine source; CT with new pna (although pna rare cause GN bacteremia); some consideration for PICC, sacrum?  Overall, new K pne bacteremia, MRSA joint infection, leukocytosis, fever, PCN allergy  - Ceftriaxone 1g q 24 (Monitor on first dose; note patient has tolerated cefepime, and ceftolozane in the past)  - Vanco 1g q 24, monitor trough  - DC Meropenem  - F/U pending BCXs  - Wound care    Deniz Bautista MD  Pager [phone]  After 5pm and on weekends call [phone]

## 2019-04-29 NOTE — PROGRESS NOTE ADULT - SUBJECTIVE AND OBJECTIVE BOX
SUBJECTIVE / OVERNIGHT EVENTS: pt seen and examined     MEDICATIONS  (STANDING):  ALBUTerol    90 MICROgram(s) HFA Inhaler 1 Puff(s) Inhalation every 4 hours  ALBUTerol/ipratropium for Nebulization 3 milliLiter(s) Nebulizer every 6 hours  apixaban 5 milliGRAM(s) Oral every 12 hours  artificial tears (preservative free) Ophthalmic Solution 1 Drop(s) Both EYES three times a day  ascorbic acid 500 milliGRAM(s) Oral daily  buDESOnide   0.5 milliGRAM(s) Respule 0.5 milliGRAM(s) Inhalation every 12 hours  cefTRIAXone   IVPB 1 Gram(s) IV Intermittent every 24 hours  chlorhexidine 4% Liquid 1 Application(s) Topical <User Schedule>  clonazePAM Tablet 1.5 milliGRAM(s) Oral <User Schedule>  dextrose 5%. 1000 milliLiter(s) (50 mL/Hr) IV Continuous <Continuous>  dextrose 50% Injectable 12.5 Gram(s) IV Push once  dextrose 50% Injectable 25 Gram(s) IV Push once  dextrose 50% Injectable 25 Gram(s) IV Push once  diphenoxylate/atropine 2 Tablet(s) Oral two times a day  famotidine    Tablet 20 milliGRAM(s) Oral daily  ferrous    sulfate Liquid 300 milliGRAM(s) Enteral Tube daily  gabapentin   Solution 300 milliGRAM(s) Oral two times a day  insulin lispro (HumaLOG) corrective regimen sliding scale   SubCutaneous three times a day before meals  insulin lispro (HumaLOG) corrective regimen sliding scale   SubCutaneous at bedtime  Medical Marijuana Oil 1 milliLiter(s),Medical Marijuana Oil 1 milliLiter(s) 1 milliLiter(s) Oral <User Schedule>  multivitamin/minerals 1 Tablet(s) Oral daily  petrolatum white Ointment 1 Application(s) Topical three times a day  predniSONE   Tablet   Oral   predniSONE   Tablet 7.5 milliGRAM(s) Oral daily  QUEtiapine 25 milliGRAM(s) Oral at bedtime  sodium chloride 3%  Inhalation 4 milliLiter(s) Inhalation every 6 hours  tiotropium 18 MICROgram(s) Capsule 1 Capsule(s) Inhalation daily  tiZANidine 4 milliGRAM(s) Oral <User Schedule>  vancomycin  IVPB 1000 milliGRAM(s) IV Intermittent every 24 hours    MEDICATIONS  (PRN):  acetaminophen    Suspension .. 650 milliGRAM(s) Oral every 6 hours PRN Temp greater or equal to 38C (100.4F), Mild Pain (1 - 3), Moderate Pain (4 - 6)  dextrose 40% Gel 15 Gram(s) Oral once PRN Blood Glucose LESS THAN 70 milliGRAM(s)/deciLiter  diphenhydrAMINE   Elixir 25 milliGRAM(s) Oral every 6 hours PRN Rash and/or Itching  glucagon  Injectable 1 milliGRAM(s) IntraMuscular once PRN Glucose <70 milliGRAM(s)/deciLiter  nystatin Powder 1 Application(s) Topical two times a day PRN rash/itchiness  sodium chloride 0.65% Nasal 1 Spray(s) Both Nostrils three times a day PRN Nasal Congestion    Vital Signs Last 24 Hrs  T(C): 36.7 (2019 15:43), Max: 37.5 (2019 21:00)  T(F): 98 (2019 15:43), Max: 99.5 (2019 21:00)  HR: 106 (2019 15:43) (100 - 117)  BP: 128/75 (2019 15:43) (115/64 - 128/75)  BP(mean): --  RR: 19 (2019 15:43) (18 - 19)  SpO2: 100% (2019 15:43) (92% - 100%)    GENERAL: NAD  EYES: EOMI, PERRLA  NECK: Supple, No JVD  CHEST/LUNG: dec breath sounds at bases , no wheezing   HEART:  S1 , S2 +  ABDOMEN: soft , bs+  EXTREMITIES:  no edema    LABS:      133<L>  |  92<L>  |  24<H>  ----------------------------<  143<H>  4.1   |  30  |  <0.30<L>    Ca    8.7      2019 06:41      Creatinine Trend: <0.30 <--, <0.30 <--, <0.30 <--, <0.30 <--, <0.30 <--, <0.30 <--, <0.30 <--                        9.0    10.9  )-----------( 429      ( 2019 06:45 )             25.8     Urine Studies:  Urinalysis Basic - ( 2019 05:44 )    Color: Light Yellow / Appearance: Clear / S.016 / pH:   Gluc:  / Ketone: Negative  / Bili: Negative / Urobili: Negative   Blood:  / Protein: 30 mg/dL / Nitrite: Negative   Leuk Esterase: Small / RBC: 19 /hpf / WBC 23 /HPF   Sq Epi:  / Non Sq Epi: 1 / Bacteria: Negative                  Gluc:  / Ketone: Negative  / Bili: Negative / Urobili: Negative   Blood:  / Protein: 30 mg/dL / Nitrite: Negative   Leuk Esterase: Small / RBC: 19 /hpf / WBC 23 /HPF   Sq Epi:  / Non Sq Epi: 1 / Bacteria: Negative                                                                    RADIOLOGY & ADDITIONAL TESTS:    Imaging Personally Reviewed:    Consultant(s) Notes Reviewed:      Care Discussed with Consultants/Other Providers:

## 2019-04-29 NOTE — PROGRESS NOTE ADULT - PROBLEM SELECTOR PLAN 2
cont nebs, chronic steroids, oxygen suppl, pt has pulm htn

## 2019-04-29 NOTE — PROGRESS NOTE ADULT - PROBLEM SELECTOR PROBLEM 4
Chronic systolic heart failure

## 2019-04-29 NOTE — PROGRESS NOTE ADULT - PROBLEM SELECTOR PROBLEM 3
Ulcerative colitis

## 2019-04-29 NOTE — PROGRESS NOTE ADULT - SUBJECTIVE AND OBJECTIVE BOX
CC: F/U for Bacteremia    Saw/spoke to patient. No fevers, no chills. Patient unchanged. Daughter/ at bedside. No fevers, no chills.    Allergies  ASA; dye contrast (Anaphylaxis)  aspirin (Short breath)  divalproex sodium (Other (Unknown))  Flowers and Plants (Short breath)  Haldol (Other (Unknown))  penicillin (Short breath; Rash)  sulfa drugs (Short breath; Rash)  vancomycin (Rash; Urticaria; Hives)  Xanax (Other (Unknown))    ANTIMICROBIALS:  meropenem  IVPB 1000 every 12 hours  vancomycin  IVPB 1000 every 24 hours    PE:    Vital Signs Last 24 Hrs  T(C): 36.7 (29 Apr 2019 15:43), Max: 37.5 (28 Apr 2019 21:00)  T(F): 98 (29 Apr 2019 15:43), Max: 99.5 (28 Apr 2019 21:00)  HR: 106 (29 Apr 2019 15:43) (100 - 117)  BP: 128/75 (29 Apr 2019 15:43) (115/64 - 128/75)  RR: 19 (29 Apr 2019 15:43) (18 - 19)  SpO2: 100% (29 Apr 2019 15:43) (92% - 100%)    Gen: AOx3, NAD, non-toxic, pleasant  CV: S1+S2 normal, tachycardic  Resp: Clear bilat, no resp distress, no crackles/wheezes  Abd: Soft, nontender, +BS  Ext: No LE edema, no wounds  : Blackman  Lines: LUE PICC    LABS:                        9.0    10.9  )-----------( 429      ( 29 Apr 2019 06:45 )             25.8     04-29    133<L>  |  92<L>  |  24<H>  ----------------------------<  143<H>  4.1   |  30  |  <0.30<L>    Ca    8.7      29 Apr 2019 06:41    MICROBIOLOGY:    .Blood  04-28-19   No growth to date.    .Blood  04-26-19   No growth to date.    .Urine  04-25-19   50,000 - 99,000 CFU/mL Yeast-like cells, presumptively not Candida  albicans "Susceptibilities not performed"    .Blood  04-25-19   No growth to date.  --  Blood Culture PCR  Klebsiella pneumoniae  Blood Culture PCR    .Urine  04-22-19   50,000 - 99,000 CFU/mL Yeast-like cells, presumptively not Candida  albicans  --  --    (otherwise reviewed)    RADIOLOGY:    4/26 CT:    FINDINGS:    LOWER CHEST: New extensive pulmonary airspace opacities.    LIVER: Within normal limits.  BILE DUCTS: Normal caliber.  GALLBLADDER: Cholecystectomy.  SPLEEN: Within normal limits.  PANCREAS: Within normal limits.  ADRENALS: Within normal limits.  KIDNEYS/URETERS: Within normal limits.      BLADDER: Partially distended with an air-fluid level. Blackman catheter is   in place.  REPRODUCTIVE ORGANS: Evaluation limited by streak artifact from right hip   hardware.    BOWEL: There are mildly dilated loops of small bowel in the left   hemiabdomen without an identifiable transition point. The appendix is not   visualized. G-tube is in place.  PERITONEUM: No ascites.  VESSELS:  Calcified atherosclerosis of the aortoiliac tree.  RETROPERITONEUM: No lymphadenopathy.    ABDOMINAL WALL: Anasarca. A sacral decubitus ulcer measures 8.4 cm and   abuts the lower sacral vertebra. The subjacent osseous cortex appears   intact.  BONES: Severe multilevel degenerative spondylosis. Marked chronic   compression fracture deformity of the L2 vertebral body. Severe   exaggeration of the lumbar lordosis and thoracic kyphosis. Findings are   unchanged from the prior exam.    IMPRESSION: New multifocal pneumonia. Sacral decubitus ulcer again noted.

## 2019-04-29 NOTE — PROGRESS NOTE ADULT - PROBLEM SELECTOR PROBLEM 7
Iron deficiency anemia

## 2019-04-29 NOTE — PROGRESS NOTE ADULT - PROBLEM SELECTOR PLAN 5
wound vac was removed
wound vac was removed
wound vac
wound vac was removed

## 2019-04-29 NOTE — PROGRESS NOTE ADULT - PROBLEM SELECTOR PROBLEM 1
R/O Prosthetic hip infection

## 2019-04-30 LAB
ANION GAP SERPL CALC-SCNC: 13 MMOL/L — SIGNIFICANT CHANGE UP (ref 5–17)
BUN SERPL-MCNC: 28 MG/DL — HIGH (ref 7–23)
CALCIUM SERPL-MCNC: 8.7 MG/DL — SIGNIFICANT CHANGE UP (ref 8.4–10.5)
CHLORIDE SERPL-SCNC: 93 MMOL/L — LOW (ref 96–108)
CO2 SERPL-SCNC: 29 MMOL/L — SIGNIFICANT CHANGE UP (ref 22–31)
CREAT SERPL-MCNC: <0.3 MG/DL — LOW (ref 0.5–1.3)
CULTURE RESULTS: SIGNIFICANT CHANGE UP
GLUCOSE BLDC GLUCOMTR-MCNC: 112 MG/DL — HIGH (ref 70–99)
GLUCOSE BLDC GLUCOMTR-MCNC: 126 MG/DL — HIGH (ref 70–99)
GLUCOSE BLDC GLUCOMTR-MCNC: 229 MG/DL — HIGH (ref 70–99)
GLUCOSE BLDC GLUCOMTR-MCNC: 272 MG/DL — HIGH (ref 70–99)
GLUCOSE SERPL-MCNC: 125 MG/DL — HIGH (ref 70–99)
GRAM STN FLD: SIGNIFICANT CHANGE UP
HCT VFR BLD CALC: 26.9 % — LOW (ref 34.5–45)
HGB BLD-MCNC: 9.4 G/DL — LOW (ref 11.5–15.5)
MCHC RBC-ENTMCNC: 32.8 PG — SIGNIFICANT CHANGE UP (ref 27–34)
MCHC RBC-ENTMCNC: 34.7 GM/DL — SIGNIFICANT CHANGE UP (ref 32–36)
MCV RBC AUTO: 94.5 FL — SIGNIFICANT CHANGE UP (ref 80–100)
PLATELET # BLD AUTO: 476 K/UL — HIGH (ref 150–400)
POTASSIUM SERPL-MCNC: 3.7 MMOL/L — SIGNIFICANT CHANGE UP (ref 3.5–5.3)
POTASSIUM SERPL-SCNC: 3.7 MMOL/L — SIGNIFICANT CHANGE UP (ref 3.5–5.3)
RBC # BLD: 2.85 M/UL — LOW (ref 3.8–5.2)
RBC # FLD: 15.6 % — HIGH (ref 10.3–14.5)
SODIUM SERPL-SCNC: 135 MMOL/L — SIGNIFICANT CHANGE UP (ref 135–145)
SPECIMEN SOURCE: SIGNIFICANT CHANGE UP
WBC # BLD: 11.7 K/UL — HIGH (ref 3.8–10.5)
WBC # FLD AUTO: 11.7 K/UL — HIGH (ref 3.8–10.5)

## 2019-04-30 PROCEDURE — 99232 SBSQ HOSP IP/OBS MODERATE 35: CPT

## 2019-04-30 RX ADMIN — SODIUM CHLORIDE 4 MILLILITER(S): 9 INJECTION INTRAMUSCULAR; INTRAVENOUS; SUBCUTANEOUS at 05:21

## 2019-04-30 RX ADMIN — Medication 250 MILLIGRAM(S): at 10:27

## 2019-04-30 RX ADMIN — Medication 3 MILLILITER(S): at 05:50

## 2019-04-30 RX ADMIN — Medication 1 APPLICATION(S): at 13:21

## 2019-04-30 RX ADMIN — Medication 7.5 MILLIGRAM(S): at 05:20

## 2019-04-30 RX ADMIN — Medication 2 TABLET(S): at 17:31

## 2019-04-30 RX ADMIN — Medication 2 TABLET(S): at 05:20

## 2019-04-30 RX ADMIN — FAMOTIDINE 20 MILLIGRAM(S): 10 INJECTION INTRAVENOUS at 09:12

## 2019-04-30 RX ADMIN — Medication 500 MILLIGRAM(S): at 09:12

## 2019-04-30 RX ADMIN — QUETIAPINE FUMARATE 25 MILLIGRAM(S): 200 TABLET, FILM COATED ORAL at 21:47

## 2019-04-30 RX ADMIN — Medication 3 MILLILITER(S): at 17:31

## 2019-04-30 RX ADMIN — SODIUM CHLORIDE 4 MILLILITER(S): 9 INJECTION INTRAMUSCULAR; INTRAVENOUS; SUBCUTANEOUS at 11:23

## 2019-04-30 RX ADMIN — GABAPENTIN 300 MILLIGRAM(S): 400 CAPSULE ORAL at 22:19

## 2019-04-30 RX ADMIN — Medication 1 APPLICATION(S): at 22:20

## 2019-04-30 RX ADMIN — APIXABAN 5 MILLIGRAM(S): 2.5 TABLET, FILM COATED ORAL at 17:31

## 2019-04-30 RX ADMIN — Medication 0.5 MILLIGRAM(S): at 17:31

## 2019-04-30 RX ADMIN — Medication 3 MILLILITER(S): at 11:24

## 2019-04-30 RX ADMIN — Medication 2: at 13:18

## 2019-04-30 RX ADMIN — TIZANIDINE 4 MILLIGRAM(S): 4 TABLET ORAL at 09:12

## 2019-04-30 RX ADMIN — APIXABAN 5 MILLIGRAM(S): 2.5 TABLET, FILM COATED ORAL at 05:20

## 2019-04-30 RX ADMIN — TIZANIDINE 4 MILLIGRAM(S): 4 TABLET ORAL at 21:47

## 2019-04-30 RX ADMIN — Medication 3: at 08:58

## 2019-04-30 RX ADMIN — CHLORHEXIDINE GLUCONATE 1 APPLICATION(S): 213 SOLUTION TOPICAL at 05:20

## 2019-04-30 RX ADMIN — Medication 25 MILLIGRAM(S): at 09:12

## 2019-04-30 RX ADMIN — CEFTRIAXONE 100 GRAM(S): 500 INJECTION, POWDER, FOR SOLUTION INTRAMUSCULAR; INTRAVENOUS at 17:28

## 2019-04-30 RX ADMIN — Medication 300 MILLIGRAM(S): at 09:12

## 2019-04-30 RX ADMIN — GABAPENTIN 300 MILLIGRAM(S): 400 CAPSULE ORAL at 10:27

## 2019-04-30 RX ADMIN — SODIUM CHLORIDE 4 MILLILITER(S): 9 INJECTION INTRAMUSCULAR; INTRAVENOUS; SUBCUTANEOUS at 17:31

## 2019-04-30 RX ADMIN — Medication 1 DROP(S): at 23:30

## 2019-04-30 RX ADMIN — Medication 1 APPLICATION(S): at 07:41

## 2019-04-30 RX ADMIN — Medication 1 TABLET(S): at 09:12

## 2019-04-30 RX ADMIN — Medication 3 MILLILITER(S): at 23:22

## 2019-04-30 RX ADMIN — Medication 0.5 MILLIGRAM(S): at 05:21

## 2019-04-30 RX ADMIN — SODIUM CHLORIDE 4 MILLILITER(S): 9 INJECTION INTRAMUSCULAR; INTRAVENOUS; SUBCUTANEOUS at 23:22

## 2019-04-30 RX ADMIN — Medication 1 DROP(S): at 13:19

## 2019-04-30 RX ADMIN — Medication 1.5 MILLIGRAM(S): at 21:57

## 2019-04-30 NOTE — PROVIDER CONTACT NOTE (CRITICAL VALUE NOTIFICATION) - ACTION/TREATMENT ORDERED:
NP aware & reviewed patient's orders and lab results. Infectious Disease Team consulted.
NP made aware and will administer IV potassium for supplementing
no orders made, will continue to monitor
will redraw potassium blood level and supplement potassium as needed.
NP made aware and will continue to monitor
NP notified and aware. Continue with current abx. Continue to monitor.
PA aware. Will f/u with attending/ID. Continue to monitor.
PA notified and aware. Continue with current abx treatment. Continue to monitor pt.

## 2019-04-30 NOTE — PROGRESS NOTE ADULT - SUBJECTIVE AND OBJECTIVE BOX
---___---___---___---___---___---___ ---___---___---___---___---___---___---___---___---___---                    <<<  M E D I C A L   A T T E N D I N G    F O L L O W    U P   N O T E  >>>    afebrile doing better  stable      ---___---___---___---___---___---      <<<  MEDICATIONS:  >>>    MEDICATIONS  (STANDING):  ALBUTerol    90 MICROgram(s) HFA Inhaler 1 Puff(s) Inhalation every 4 hours  ALBUTerol/ipratropium for Nebulization 3 milliLiter(s) Nebulizer every 6 hours  apixaban 5 milliGRAM(s) Oral every 12 hours  artificial tears (preservative free) Ophthalmic Solution 1 Drop(s) Both EYES three times a day  ascorbic acid 500 milliGRAM(s) Oral daily  buDESOnide   0.5 milliGRAM(s) Respule 0.5 milliGRAM(s) Inhalation every 12 hours  cefTRIAXone   IVPB 1 Gram(s) IV Intermittent every 24 hours  chlorhexidine 4% Liquid 1 Application(s) Topical <User Schedule>  clonazePAM Tablet 1.5 milliGRAM(s) Oral <User Schedule>  dextrose 5%. 1000 milliLiter(s) (50 mL/Hr) IV Continuous <Continuous>  dextrose 50% Injectable 12.5 Gram(s) IV Push once  dextrose 50% Injectable 25 Gram(s) IV Push once  dextrose 50% Injectable 25 Gram(s) IV Push once  diphenoxylate/atropine 2 Tablet(s) Oral two times a day  famotidine    Tablet 20 milliGRAM(s) Oral daily  ferrous    sulfate Liquid 300 milliGRAM(s) Enteral Tube daily  gabapentin   Solution 300 milliGRAM(s) Oral two times a day  insulin lispro (HumaLOG) corrective regimen sliding scale   SubCutaneous three times a day before meals  insulin lispro (HumaLOG) corrective regimen sliding scale   SubCutaneous at bedtime  Medical Marijuana Oil 1 milliLiter(s),Medical Marijuana Oil 1 milliLiter(s) 1 milliLiter(s) Oral <User Schedule>  multivitamin/minerals 1 Tablet(s) Oral daily  petrolatum white Ointment 1 Application(s) Topical three times a day  predniSONE   Tablet   Oral   predniSONE   Tablet 7.5 milliGRAM(s) Oral daily  QUEtiapine 25 milliGRAM(s) Oral at bedtime  sodium chloride 3%  Inhalation 4 milliLiter(s) Inhalation every 6 hours  tiotropium 18 MICROgram(s) Capsule 1 Capsule(s) Inhalation daily  tiZANidine 4 milliGRAM(s) Oral <User Schedule>  vancomycin  IVPB 1000 milliGRAM(s) IV Intermittent every 24 hours      MEDICATIONS  (PRN):  acetaminophen    Suspension .. 650 milliGRAM(s) Oral every 6 hours PRN Temp greater or equal to 38C (100.4F), Mild Pain (1 - 3), Moderate Pain (4 - 6)  dextrose 40% Gel 15 Gram(s) Oral once PRN Blood Glucose LESS THAN 70 milliGRAM(s)/deciLiter  diphenhydrAMINE   Elixir 25 milliGRAM(s) Oral every 6 hours PRN Rash and/or Itching  glucagon  Injectable 1 milliGRAM(s) IntraMuscular once PRN Glucose <70 milliGRAM(s)/deciLiter  nystatin Powder 1 Application(s) Topical two times a day PRN rash/itchiness  sodium chloride 0.65% Nasal 1 Spray(s) Both Nostrils three times a day PRN Nasal Congestion       ---___---___---___---___---___---     <<<REVIEW OF SYSTEM: >>>    unable to obtai   ---___---___---___---___---___---          <<<  VITAL SIGNS: >>>    T(F): 98.1 (04-30-19 @ 04:40), Max: 98.2 (04-29-19 @ 23:35)  HR: 100 (04-30-19 @ 04:40) (100 - 107)  BP: 113/67 (04-30-19 @ 04:40) (108/64 - 128/75)  RR: 18 (04-29-19 @ 23:35) (18 - 19)  SpO2: 95% (04-29-19 @ 23:35) (95% - 100%)  Wt(kg): --  CAPILLARY BLOOD GLUCOSE      POCT Blood Glucose.: 124 mg/dL (29 Apr 2019 22:14)    I&O's Summary    29 Apr 2019 07:01  -  30 Apr 2019 07:00  --------------------------------------------------------  IN: 720 mL / OUT: 650 mL / NET: 70 mL         ---___---___---___---___---___---                       PHYSICAL EXAM:    GEN: A&O X 0 , NAD , comfortable  HEENT: NCAT, PERRL, MMM, no scleral icterus, hearing intact  NECK: Supple, No JVD  CVS: S1S2 , regular , No M/R/G appreciated  PULM: CTA B/L,  no W/R/R appreciated  ABD.: soft. non tender, non distended,  bowel sounds present peg noted   Extrem: intact pulses , no edema noted brace to left leg   Derm: No rash or ecchymosis noted sacral decubitus noted   PSYCH: normal mood, no depression, not anxious     ---___---___---___---___---___---     <<<  LAB AND IMAGING: >>>                          9.0    10.9  )-----------( 429      ( 29 Apr 2019 06:45 )             25.8               04-29    133<L>  |  92<L>  |  24<H>  ----------------------------<  143<H>  4.1   |  30  |  <0.30<L>    Ca    8.7      29 Apr 2019 06:41                                 [All pertinent / recent available Imaging reports and other labs reviewed]     ---___---___---___---___---___---___ ---___---___---___---___---           <<<  A S S E S S M E N T   A N D   P L A N :  >>>    chronic osteomyelitis right hip spacer infected chronic mrsa on vanco   asthma continue medication   chf stable   agitation on seroquel and klonopin   possible pneumonia  continue to follow and check blood cultures  dm2 on insulin lispro   GI/DVT Prophylaxis. on eliquis  anemia check cbc monitor closely        --------------------------------------------  Case discussed with kendra  mya   Education given on     >>______________________<<      Deniz Bolden .         phone   [phone]

## 2019-04-30 NOTE — PROVIDER CONTACT NOTE (CRITICAL VALUE NOTIFICATION) - RECOMMENDATIONS
Assess patient. Review patient's lab results and orders.
will continue to monitor.
Continue with current abx treatment. Continue to monitor pt.
Continue with current abx. Continue to monitor.
continue to monitor
continue to monitor

## 2019-04-30 NOTE — PROVIDER CONTACT NOTE (CRITICAL VALUE NOTIFICATION) - TEST AND RESULT REPORTED:
Lab Lactate, Blood 4.0.
+ Blood cultures
K- 2.9
Lactate 5.8
blood culture positive anaerobic bottle, gram negative rods
clood culture positive for gram positive cocci in clusters in anaerobic bottle
potassium 2.6
prelim- growth in the aerobic bottle- gram positive cocci and clusters

## 2019-04-30 NOTE — PROVIDER CONTACT NOTE (CRITICAL VALUE NOTIFICATION) - ASSESSMENT
Patient's Lab Lactate, Blood result is 4.0. Patient has an active order for Vancomycin 1000 mg IVPB every 24 hours.
pt alert but nonverbal.  at bedside. pt appears comfortable at this time.
Pt nonverbal. Pt febrile overnight with WBC24.9. Pt continues IV abx.
Pt. nonverbal but alert. Pt. VSS, no fever noted since this morning.
Pt. nonverval. Shows no signs of pain or discomfort. VSS, no fever present.
a&ox0 nonverbal, no fevers
afebrile. vs stable, WBC elevated, pt is on antibiotics
unable to assess; although no s/s/ of distress

## 2019-04-30 NOTE — PROGRESS NOTE ADULT - ASSESSMENT
69 yo with advanced dementia , bed bound, contracted, cva, sp removal of infected hip due to mrsa with placement of space last fall  Pt has been having fevers for months  Suspected ongoing Om of the hip site but changing the spacer is not a realistic option--On Vanco coverage  Chronic Om of the sacrum usually does not cause fevers and does not need prolonged ab therapy  PICC line changed 4/18  Has leukocytosis, intermittent fevers     UA equivocal (UCX pending)  BCX now with K pne S pending  CT with multifocal pna  Source? Does not appears urine source; CT with new pna (although pna rare cause GN bacteremia);    Overall, new K pne bacteremia, MRSA joint infection, leukocytosis, fever, PCN allergy  - Ceftriaxone 1g q 24 (Monitor on first dose; note patient has tolerated cefepime, and ceftolozane in the past)  - Vanco 1g q 24, monitor trough      in view of response and follow up negative bc, will continue current regimen   hold on changing picc at present   discussed with  in detail     Deniz Bautista MD  Pager [phone]  After 5pm and on weekends call [phone]

## 2019-04-30 NOTE — PROVIDER CONTACT NOTE (CRITICAL VALUE NOTIFICATION) - SITUATION
Patient's Lab Lactate, Blood resulted.
+ Blood cultures
blood culture positive anaerobic bottle, gram negative rods
blood culture positive for gram positive cocci in clusters in an aerobic bottle
lactate 5.8
potassium elevated
prelim- growth in the aerobic bottle- gram positive cocci and clusters

## 2019-04-30 NOTE — PROGRESS NOTE ADULT - SUBJECTIVE AND OBJECTIVE BOX
INTERVAL HPI/OVERNIGHT EVENTS:    MEDICATIONS  (STANDING):  ALBUTerol    90 MICROgram(s) HFA Inhaler 1 Puff(s) Inhalation every 4 hours  ALBUTerol/ipratropium for Nebulization 3 milliLiter(s) Nebulizer every 6 hours  apixaban 5 milliGRAM(s) Oral every 12 hours  artificial tears (preservative free) Ophthalmic Solution 1 Drop(s) Both EYES three times a day  ascorbic acid 500 milliGRAM(s) Oral daily  buDESOnide   0.5 milliGRAM(s) Respule 0.5 milliGRAM(s) Inhalation every 12 hours  cefTRIAXone   IVPB 1 Gram(s) IV Intermittent every 24 hours  chlorhexidine 4% Liquid 1 Application(s) Topical <User Schedule>  clonazePAM Tablet 1.5 milliGRAM(s) Oral <User Schedule>  dextrose 5%. 1000 milliLiter(s) (50 mL/Hr) IV Continuous <Continuous>  dextrose 50% Injectable 12.5 Gram(s) IV Push once  dextrose 50% Injectable 25 Gram(s) IV Push once  dextrose 50% Injectable 25 Gram(s) IV Push once  diphenoxylate/atropine 2 Tablet(s) Oral two times a day  famotidine    Tablet 20 milliGRAM(s) Oral daily  ferrous    sulfate Liquid 300 milliGRAM(s) Enteral Tube daily  gabapentin   Solution 300 milliGRAM(s) Oral two times a day  insulin lispro (HumaLOG) corrective regimen sliding scale   SubCutaneous three times a day before meals  insulin lispro (HumaLOG) corrective regimen sliding scale   SubCutaneous at bedtime  Medical Marijuana Oil 1 milliLiter(s),Medical Marijuana Oil 1 milliLiter(s) 1 milliLiter(s) Oral <User Schedule>  multivitamin/minerals 1 Tablet(s) Oral daily  petrolatum white Ointment 1 Application(s) Topical three times a day  predniSONE   Tablet   Oral   predniSONE   Tablet 7.5 milliGRAM(s) Oral daily  QUEtiapine 25 milliGRAM(s) Oral at bedtime  sodium chloride 3%  Inhalation 4 milliLiter(s) Inhalation every 6 hours  tiotropium 18 MICROgram(s) Capsule 1 Capsule(s) Inhalation daily  tiZANidine 4 milliGRAM(s) Oral <User Schedule>  vancomycin  IVPB 1000 milliGRAM(s) IV Intermittent every 24 hours    MEDICATIONS  (PRN):  acetaminophen    Suspension .. 650 milliGRAM(s) Oral every 6 hours PRN Temp greater or equal to 38C (100.4F), Mild Pain (1 - 3), Moderate Pain (4 - 6)  dextrose 40% Gel 15 Gram(s) Oral once PRN Blood Glucose LESS THAN 70 milliGRAM(s)/deciLiter  diphenhydrAMINE   Elixir 25 milliGRAM(s) Oral every 6 hours PRN Rash and/or Itching  glucagon  Injectable 1 milliGRAM(s) IntraMuscular once PRN Glucose <70 milliGRAM(s)/deciLiter  nystatin Powder 1 Application(s) Topical two times a day PRN rash/itchiness  sodium chloride 0.65% Nasal 1 Spray(s) Both Nostrils three times a day PRN Nasal Congestion      Allergies    ASA; dye contrast (Anaphylaxis)  aspirin (Short breath)  divalproex sodium (Other (Unknown))  Flowers and Plants (Short breath)  Haldol (Other (Unknown))  penicillin (Short breath; Rash)  sulfa drugs (Short breath; Rash)  vancomycin (Rash; Urticaria; Hives)  Xanax (Other (Unknown))    Intolerances            PHYSICAL EXAM:   Vital Signs:  Vital Signs Last 24 Hrs  T(C): 36.7 (2019 04:40), Max: 36.8 (2019 23:35)  T(F): 98.1 (2019 04:40), Max: 98.2 (2019 23:35)  HR: 100 (2019 04:40) (100 - 107)  BP: 113/67 (2019 04:40) (108/64 - 128/75)  BP(mean): --  RR: 18 (2019 23:35) (18 - 19)  SpO2: 95% (2019 23:35) (95% - 100%)  Daily     Daily Weight in k.4 (2019 05:11)    GENERAL:  no distress  HEENT:  NC/AT,  anicteric  CHEST:   no increased effort, breath sounds clear  HEART:  Regular rhythm  ABDOMEN:  Soft, non-tender, non-distended, normoactive bowel sounds,  no masses ,no hepato-splenomegaly, no signs of chronic liver disease  EXTEREMITIES:  no cyanosis      LABS:                        9.4    11.7  )-----------( 476      ( 2019 07:03 )             26.9     04-30    135  |  93<L>  |  28<H>  ----------------------------<  125<H>  3.7   |  29  |  <0.30<L>    Ca    8.7      2019 07:00            RADIOLOGY & ADDITIONAL TESTS:

## 2019-04-30 NOTE — PROGRESS NOTE ADULT - SUBJECTIVE AND OBJECTIVE BOX
infectious diseases progress note:    Patient is a 68y old  Female who presents with a chief complaint of fevers (30 Apr 2019 07:33)        Fever             Allergies    ASA; dye contrast (Anaphylaxis)  aspirin (Short breath)  divalproex sodium (Other (Unknown))  Flowers and Plants (Short breath)  Haldol (Other (Unknown))  penicillin (Short breath; Rash)  sulfa drugs (Short breath; Rash)  vancomycin (Rash; Urticaria; Hives)  Xanax (Other (Unknown))    Intolerances        ANTIBIOTICS/RELEVANT:  antimicrobials  cefTRIAXone   IVPB 1 Gram(s) IV Intermittent every 24 hours  vancomycin  IVPB 1000 milliGRAM(s) IV Intermittent every 24 hours    immunologic:    OTHER:  acetaminophen    Suspension .. 650 milliGRAM(s) Oral every 6 hours PRN  ALBUTerol    90 MICROgram(s) HFA Inhaler 1 Puff(s) Inhalation every 4 hours  ALBUTerol/ipratropium for Nebulization 3 milliLiter(s) Nebulizer every 6 hours  apixaban 5 milliGRAM(s) Oral every 12 hours  artificial tears (preservative free) Ophthalmic Solution 1 Drop(s) Both EYES three times a day  ascorbic acid 500 milliGRAM(s) Oral daily  buDESOnide   0.5 milliGRAM(s) Respule 0.5 milliGRAM(s) Inhalation every 12 hours  chlorhexidine 4% Liquid 1 Application(s) Topical <User Schedule>  clonazePAM Tablet 1.5 milliGRAM(s) Oral <User Schedule>  dextrose 40% Gel 15 Gram(s) Oral once PRN  dextrose 5%. 1000 milliLiter(s) IV Continuous <Continuous>  dextrose 50% Injectable 12.5 Gram(s) IV Push once  dextrose 50% Injectable 25 Gram(s) IV Push once  dextrose 50% Injectable 25 Gram(s) IV Push once  diphenhydrAMINE   Elixir 25 milliGRAM(s) Oral every 6 hours PRN  diphenoxylate/atropine 2 Tablet(s) Oral two times a day  famotidine    Tablet 20 milliGRAM(s) Oral daily  ferrous    sulfate Liquid 300 milliGRAM(s) Enteral Tube daily  gabapentin   Solution 300 milliGRAM(s) Oral two times a day  glucagon  Injectable 1 milliGRAM(s) IntraMuscular once PRN  insulin lispro (HumaLOG) corrective regimen sliding scale   SubCutaneous three times a day before meals  insulin lispro (HumaLOG) corrective regimen sliding scale   SubCutaneous at bedtime  Medical Marijuana Oil 1 milliLiter(s),Medical Marijuana Oil 1 milliLiter(s) 1 milliLiter(s) Oral <User Schedule>  multivitamin/minerals 1 Tablet(s) Oral daily  nystatin Powder 1 Application(s) Topical two times a day PRN  petrolatum white Ointment 1 Application(s) Topical three times a day  predniSONE   Tablet   Oral   predniSONE   Tablet 7.5 milliGRAM(s) Oral daily  QUEtiapine 25 milliGRAM(s) Oral at bedtime  sodium chloride 0.65% Nasal 1 Spray(s) Both Nostrils three times a day PRN  sodium chloride 3%  Inhalation 4 milliLiter(s) Inhalation every 6 hours  tiotropium 18 MICROgram(s) Capsule 1 Capsule(s) Inhalation daily  tiZANidine 4 milliGRAM(s) Oral <User Schedule>      Objective:  Vital Signs Last 24 Hrs  T(C): 36.7 (30 Apr 2019 04:40), Max: 36.8 (29 Apr 2019 23:35)  T(F): 98.1 (30 Apr 2019 04:40), Max: 98.2 (29 Apr 2019 23:35)  HR: 100 (30 Apr 2019 04:40) (100 - 107)  BP: 113/67 (30 Apr 2019 04:40) (108/64 - 128/75)  BP(mean): --  RR: 18 (29 Apr 2019 23:35) (18 - 19)  SpO2: 95% (29 Apr 2019 23:35) (95% - 100%)    PHYSICAL EXAM:     Eyes:JACQUELIN, EOMI  Ear/Nose/Throat: no oral lesion, no sinus tenderness on percussion	  Neck:no JVD, no lymphadenopathy, supple  Respiratory: CTA maryjane  Cardiovascular: S1S2 RRR, no murmurs  Gastrointestinal:soft, (+) BS, no HSM  Extremities:no e/e/c        LABS:                        9.4    11.7  )-----------( 476      ( 30 Apr 2019 07:03 )             26.9     04-30    135  |  93<L>  |  28<H>  ----------------------------<  125<H>  3.7   |  29  |  <0.30<L>    Ca    8.7      30 Apr 2019 07:00              MICROBIOLOGY:    RECENT CULTURES:  04-28 @ 11:51 .Blood       Growth in aerobic bottle: Gram Positive Cocci in Clusters           No growth to date.    04-26 @ 14:11 .Blood                No growth to date.    04-25 @ 08:59 .Urine                50,000 - 99,000 CFU/mL Yeast-like cells, presumptively not Candida  albicans "Susceptibilities not performed"    04-25 @ 08:48 .Blood   PCR    Growth in anaerobic bottle: Gram Negative Rods  Growth in aerobic bottle: Gram Positive Cocci in Clusters    Blood Culture PCR  Klebsiella pneumoniae  Blood Culture PCR  Blood Culture PCR     No growth to date.          RESPIRATORY CULTURES:              RADIOLOGY & ADDITIONAL STUDIES:        Pager [phone]  After 5 pm/weekends or if no response :[phone]

## 2019-04-30 NOTE — PROVIDER CONTACT NOTE (CRITICAL VALUE NOTIFICATION) - PERSON GIVING RESULT:
Danielle Schneider
Judi Altman, Lab
Lab, Niecy Crews
Lab- Danielle Gutiérrez
[facility] - [facility]
Nikki Altman, lab
deana gayle
: Andrey Whalen

## 2019-04-30 NOTE — PROVIDER CONTACT NOTE (CRITICAL VALUE NOTIFICATION) - BACKGROUND
Patient admitted for Fever, Hypertension, Anemia, Asthma, Dementia, Functional Quadriplegia, Pressure Injury, Heart Failure.
DX: fever  PMH: CVA, pulmonary HTN, ulcerative colitis, ashtma, hyperlipidemia, anxiety, depression, acid reflux, mouth sores.
Pt admitted for fever, sepsis/UTI
Pt. came in with fever and sepsis. PMH CVA and asthma, PNA.
Pt. came in with fevers and sepsis. PMH CVA and asthma.
admitted fever
fever
sepsis, uti, sacral wounds, CVA, loose BM

## 2019-05-01 LAB
CULTURE RESULTS: SIGNIFICANT CHANGE UP
GLUCOSE BLDC GLUCOMTR-MCNC: 134 MG/DL — HIGH (ref 70–99)
GLUCOSE BLDC GLUCOMTR-MCNC: 140 MG/DL — HIGH (ref 70–99)
GLUCOSE BLDC GLUCOMTR-MCNC: 185 MG/DL — HIGH (ref 70–99)
GLUCOSE BLDC GLUCOMTR-MCNC: 201 MG/DL — HIGH (ref 70–99)
SPECIMEN SOURCE: SIGNIFICANT CHANGE UP

## 2019-05-01 PROCEDURE — 99232 SBSQ HOSP IP/OBS MODERATE 35: CPT

## 2019-05-01 RX ORDER — AZTREONAM 2 G
2000 VIAL (EA) INJECTION EVERY 8 HOURS
Qty: 0 | Refills: 0 | Status: DISCONTINUED | OUTPATIENT
Start: 2019-05-01 | End: 2019-05-03

## 2019-05-01 RX ADMIN — Medication 1 DROP(S): at 21:59

## 2019-05-01 RX ADMIN — Medication 500 MILLIGRAM(S): at 09:39

## 2019-05-01 RX ADMIN — Medication 2 TABLET(S): at 06:31

## 2019-05-01 RX ADMIN — SODIUM CHLORIDE 4 MILLILITER(S): 9 INJECTION INTRAMUSCULAR; INTRAVENOUS; SUBCUTANEOUS at 17:22

## 2019-05-01 RX ADMIN — SODIUM CHLORIDE 4 MILLILITER(S): 9 INJECTION INTRAMUSCULAR; INTRAVENOUS; SUBCUTANEOUS at 23:13

## 2019-05-01 RX ADMIN — Medication 1 DROP(S): at 14:18

## 2019-05-01 RX ADMIN — QUETIAPINE FUMARATE 25 MILLIGRAM(S): 200 TABLET, FILM COATED ORAL at 21:59

## 2019-05-01 RX ADMIN — Medication 1 DROP(S): at 09:33

## 2019-05-01 RX ADMIN — Medication 1.5 MILLIGRAM(S): at 22:02

## 2019-05-01 RX ADMIN — Medication 0.5 MILLIGRAM(S): at 06:24

## 2019-05-01 RX ADMIN — Medication 25 MILLIGRAM(S): at 17:22

## 2019-05-01 RX ADMIN — TIZANIDINE 4 MILLIGRAM(S): 4 TABLET ORAL at 09:33

## 2019-05-01 RX ADMIN — Medication 7.5 MILLIGRAM(S): at 06:33

## 2019-05-01 RX ADMIN — Medication 1 APPLICATION(S): at 09:56

## 2019-05-01 RX ADMIN — Medication 3 MILLILITER(S): at 06:23

## 2019-05-01 RX ADMIN — GABAPENTIN 300 MILLIGRAM(S): 400 CAPSULE ORAL at 17:19

## 2019-05-01 RX ADMIN — Medication 1 APPLICATION(S): at 22:02

## 2019-05-01 RX ADMIN — TIZANIDINE 4 MILLIGRAM(S): 4 TABLET ORAL at 23:13

## 2019-05-01 RX ADMIN — Medication 250 MILLIGRAM(S): at 11:21

## 2019-05-01 RX ADMIN — Medication 2: at 09:28

## 2019-05-01 RX ADMIN — Medication 3 MILLILITER(S): at 11:21

## 2019-05-01 RX ADMIN — Medication 650 MILLIGRAM(S): at 20:56

## 2019-05-01 RX ADMIN — Medication 2 TABLET(S): at 18:54

## 2019-05-01 RX ADMIN — GABAPENTIN 300 MILLIGRAM(S): 400 CAPSULE ORAL at 20:56

## 2019-05-01 RX ADMIN — FAMOTIDINE 20 MILLIGRAM(S): 10 INJECTION INTRAVENOUS at 09:39

## 2019-05-01 RX ADMIN — Medication 100 MILLIGRAM(S): at 22:20

## 2019-05-01 RX ADMIN — Medication 1 APPLICATION(S): at 18:26

## 2019-05-01 RX ADMIN — SODIUM CHLORIDE 4 MILLILITER(S): 9 INJECTION INTRAMUSCULAR; INTRAVENOUS; SUBCUTANEOUS at 11:21

## 2019-05-01 RX ADMIN — CHLORHEXIDINE GLUCONATE 1 APPLICATION(S): 213 SOLUTION TOPICAL at 11:29

## 2019-05-01 RX ADMIN — SODIUM CHLORIDE 4 MILLILITER(S): 9 INJECTION INTRAMUSCULAR; INTRAVENOUS; SUBCUTANEOUS at 06:23

## 2019-05-01 RX ADMIN — Medication 1 TABLET(S): at 09:39

## 2019-05-01 RX ADMIN — Medication 1: at 14:17

## 2019-05-01 RX ADMIN — Medication 25 MILLIGRAM(S): at 09:30

## 2019-05-01 RX ADMIN — Medication 300 MILLIGRAM(S): at 09:39

## 2019-05-01 RX ADMIN — APIXABAN 5 MILLIGRAM(S): 2.5 TABLET, FILM COATED ORAL at 18:27

## 2019-05-01 RX ADMIN — APIXABAN 5 MILLIGRAM(S): 2.5 TABLET, FILM COATED ORAL at 06:32

## 2019-05-01 RX ADMIN — Medication 0.5 MILLIGRAM(S): at 18:27

## 2019-05-01 RX ADMIN — Medication 3 MILLILITER(S): at 23:13

## 2019-05-01 RX ADMIN — Medication 3 MILLILITER(S): at 18:27

## 2019-05-01 NOTE — PROGRESS NOTE ADULT - SUBJECTIVE AND OBJECTIVE BOX
---___---___---___---___---___---___ ---___---___---___---___---___---___---___---___---___---                    <<<  M E D I C A L   A T T E N D I N G    F O L L O W    U P   N O T E  >>>    - daughter and nurse noticed red rash to the body. no other complaints   ---___---___---___---___---___---      <<<  MEDICATIONS:  >>>    MEDICATIONS  (STANDING):  ALBUTerol    90 MICROgram(s) HFA Inhaler 1 Puff(s) Inhalation every 4 hours  ALBUTerol/ipratropium for Nebulization 3 milliLiter(s) Nebulizer every 6 hours  apixaban 5 milliGRAM(s) Oral every 12 hours  artificial tears (preservative free) Ophthalmic Solution 1 Drop(s) Both EYES three times a day  ascorbic acid 500 milliGRAM(s) Oral daily  buDESOnide   0.5 milliGRAM(s) Respule 0.5 milliGRAM(s) Inhalation every 12 hours  cefTRIAXone   IVPB 1 Gram(s) IV Intermittent every 24 hours  chlorhexidine 4% Liquid 1 Application(s) Topical <User Schedule>  clonazePAM Tablet 1.5 milliGRAM(s) Oral <User Schedule>  dextrose 5%. 1000 milliLiter(s) (50 mL/Hr) IV Continuous <Continuous>  dextrose 50% Injectable 12.5 Gram(s) IV Push once  dextrose 50% Injectable 25 Gram(s) IV Push once  dextrose 50% Injectable 25 Gram(s) IV Push once  diphenoxylate/atropine 2 Tablet(s) Oral two times a day  famotidine    Tablet 20 milliGRAM(s) Oral daily  ferrous    sulfate Liquid 300 milliGRAM(s) Enteral Tube daily  gabapentin   Solution 300 milliGRAM(s) Oral two times a day  insulin lispro (HumaLOG) corrective regimen sliding scale   SubCutaneous three times a day before meals  insulin lispro (HumaLOG) corrective regimen sliding scale   SubCutaneous at bedtime  Medical Marijuana Oil 1 milliLiter(s),Medical Marijuana Oil 1 milliLiter(s) 1 milliLiter(s) Oral <User Schedule>  multivitamin/minerals 1 Tablet(s) Oral daily  petrolatum white Ointment 1 Application(s) Topical three times a day  predniSONE   Tablet   Oral   predniSONE   Tablet 7.5 milliGRAM(s) Oral daily  QUEtiapine 25 milliGRAM(s) Oral at bedtime  sodium chloride 3%  Inhalation 4 milliLiter(s) Inhalation every 6 hours  tiotropium 18 MICROgram(s) Capsule 1 Capsule(s) Inhalation daily  tiZANidine 4 milliGRAM(s) Oral <User Schedule>  vancomycin  IVPB 1000 milliGRAM(s) IV Intermittent every 24 hours      MEDICATIONS  (PRN):  acetaminophen    Suspension .. 650 milliGRAM(s) Oral every 6 hours PRN Temp greater or equal to 38C (100.4F), Mild Pain (1 - 3), Moderate Pain (4 - 6)  dextrose 40% Gel 15 Gram(s) Oral once PRN Blood Glucose LESS THAN 70 milliGRAM(s)/deciLiter  diphenhydrAMINE   Elixir 25 milliGRAM(s) Oral every 6 hours PRN Rash and/or Itching  glucagon  Injectable 1 milliGRAM(s) IntraMuscular once PRN Glucose <70 milliGRAM(s)/deciLiter  nystatin Powder 1 Application(s) Topical two times a day PRN rash/itchiness  sodium chloride 0.65% Nasal 1 Spray(s) Both Nostrils three times a day PRN Nasal Congestion       ---___---___---___---___---___---     <<<REVIEW OF SYSTEM: >>>    unable to obtain       ---___---___---___---___---___---          <<<  VITAL SIGNS: >>>    T(F): 100.1 (05-01-19 @ 17:16), Max: 100.1 (05-01-19 @ 17:16)  HR: 110 (05-01-19 @ 17:16) (92 - 110)  BP: 122/60 (05-01-19 @ 17:16) (111/60 - 129/75)  RR: 20 (05-01-19 @ 17:16) (18 - 20)  SpO2: 95% (05-01-19 @ 17:16) (95% - 99%)  Wt(kg): --  CAPILLARY BLOOD GLUCOSE      POCT Blood Glucose.: 134 mg/dL (01 May 2019 18:00)    I&O's Summary    30 Apr 2019 07:01  -  01 May 2019 07:00  --------------------------------------------------------  IN: 1700 mL / OUT: 1200 mL / NET: 500 mL    01 May 2019 07:01  -  01 May 2019 18:24  --------------------------------------------------------  IN: 480 mL / OUT: 0 mL / NET: 480 mL         ---___---___---___---___---___---                       PHYSICAL EXAM:    GEN: A&O X 0 , NAD , comfortable  HEENT: NCAT, PERRL, MMM, no scleral icterus, hearing intact  NECK: Supple, No JVD  CVS: S1S2 , regular , No M/R/G appreciated  PULM: CTA B/L,  no W/R/R appreciated  ABD.: soft. non tender, non distended,  bowel sounds present peg noted   Extrem: intact pulses , no edema noted  Derm: No rash or ecchymosis noted  PSYCH: normal mood, no depression, not anxious     ---___---___---___---___---___---     <<<  LAB AND IMAGING: >>>                          9.4    11.7  )-----------( 476      ( 30 Apr 2019 07:03 )             26.9               04-30    135  |  93<L>  |  28<H>  ----------------------------<  125<H>  3.7   |  29  |  <0.30<L>    Ca    8.7      30 Apr 2019 07:00                                 [All pertinent / recent available Imaging reports and other labs reviewed]     ---___---___---___---___---___---___ ---___---___---___---___---           <<<  A S S E S S M E N T   A N D   P L A N :  >>>    klebsiella pneumonia  cultures repeated   chronic mrsa infection on vancomycin. needs to be on slow drip   chronic pain on medical marijuana  chf on lisinopril continue meds is stable for now   anxiety/ dmeenti on seroquel and klonopin   asthma on budesonide  supportive care for fracture left leg   -GI/DVT Prophylaxis. continue eliquis  benadryl for the rash    --------------------------------------------  Case discussed with   Education given on     >>______________________<<      Deniz Bolden .         phone   [phone]

## 2019-05-01 NOTE — PROGRESS NOTE ADULT - SUBJECTIVE AND OBJECTIVE BOX
infectious diseases progress note:    Patient is a 68y old  Female who presents with a chief complaint of fevers (30 Apr 2019 09:05)        Fever             Allergies    ASA; dye contrast (Anaphylaxis)  aspirin (Short breath)  divalproex sodium (Other (Unknown))  Flowers and Plants (Short breath)  Haldol (Other (Unknown))  penicillin (Short breath; Rash)  sulfa drugs (Short breath; Rash)  vancomycin (Rash; Urticaria; Hives)  Xanax (Other (Unknown))    Intolerances        ANTIBIOTICS/RELEVANT:  antimicrobials  cefTRIAXone   IVPB 1 Gram(s) IV Intermittent every 24 hours  vancomycin  IVPB 1000 milliGRAM(s) IV Intermittent every 24 hours    immunologic:    OTHER:  acetaminophen    Suspension .. 650 milliGRAM(s) Oral every 6 hours PRN  ALBUTerol    90 MICROgram(s) HFA Inhaler 1 Puff(s) Inhalation every 4 hours  ALBUTerol/ipratropium for Nebulization 3 milliLiter(s) Nebulizer every 6 hours  apixaban 5 milliGRAM(s) Oral every 12 hours  artificial tears (preservative free) Ophthalmic Solution 1 Drop(s) Both EYES three times a day  ascorbic acid 500 milliGRAM(s) Oral daily  buDESOnide   0.5 milliGRAM(s) Respule 0.5 milliGRAM(s) Inhalation every 12 hours  chlorhexidine 4% Liquid 1 Application(s) Topical <User Schedule>  clonazePAM Tablet 1.5 milliGRAM(s) Oral <User Schedule>  dextrose 40% Gel 15 Gram(s) Oral once PRN  dextrose 5%. 1000 milliLiter(s) IV Continuous <Continuous>  dextrose 50% Injectable 12.5 Gram(s) IV Push once  dextrose 50% Injectable 25 Gram(s) IV Push once  dextrose 50% Injectable 25 Gram(s) IV Push once  diphenhydrAMINE   Elixir 25 milliGRAM(s) Oral every 6 hours PRN  diphenoxylate/atropine 2 Tablet(s) Oral two times a day  famotidine    Tablet 20 milliGRAM(s) Oral daily  ferrous    sulfate Liquid 300 milliGRAM(s) Enteral Tube daily  gabapentin   Solution 300 milliGRAM(s) Oral two times a day  glucagon  Injectable 1 milliGRAM(s) IntraMuscular once PRN  insulin lispro (HumaLOG) corrective regimen sliding scale   SubCutaneous three times a day before meals  insulin lispro (HumaLOG) corrective regimen sliding scale   SubCutaneous at bedtime  multivitamin/minerals 1 Tablet(s) Oral daily  nystatin Powder 1 Application(s) Topical two times a day PRN  petrolatum white Ointment 1 Application(s) Topical three times a day  predniSONE   Tablet   Oral   predniSONE   Tablet 7.5 milliGRAM(s) Oral daily  QUEtiapine 25 milliGRAM(s) Oral at bedtime  sodium chloride 0.65% Nasal 1 Spray(s) Both Nostrils three times a day PRN  sodium chloride 3%  Inhalation 4 milliLiter(s) Inhalation every 6 hours  tiotropium 18 MICROgram(s) Capsule 1 Capsule(s) Inhalation daily  tiZANidine 4 milliGRAM(s) Oral <User Schedule>      Objective:  Vital Signs Last 24 Hrs  T(C): 36.4 (01 May 2019 06:43), Max: 36.8 (30 Apr 2019 14:22)  T(F): 97.5 (01 May 2019 06:43), Max: 98.2 (30 Apr 2019 14:22)  HR: 92 (01 May 2019 06:43) (92 - 102)  BP: 126/70 (01 May 2019 06:43) (104/61 - 126/70)  BP(mean): --  RR: 18 (01 May 2019 06:43) (18 - 19)  SpO2: 99% (01 May 2019 06:43) (98% - 99%)          Eyes:JACQUELIN, EOMI  Ear/Nose/Throat: no oral lesion, no sinus tenderness on percussion	  Neck:no JVD, no lymphadenopathy, supple  Respiratory: CTA maryjane  Cardiovascular: S1S2 RRR, no murmurs  Gastrointestinal:soft, (+) BS, no HSM  Extremities:n contracted legs e       LABS:                        9.4    11.7  )-----------( 476      ( 30 Apr 2019 07:03 )             26.9     04-30    135  |  93<L>  |  28<H>  ----------------------------<  125<H>  3.7   |  29  |  <0.30<L>    Ca    8.7      30 Apr 2019 07:00              MICROBIOLOGY:    RECENT CULTURES:  04-28 @ 11:51 .Blood       Growth in aerobic bottle: Gram Positive Cocci in Clusters           No growth to date.    04-26 @ 14:11 .Blood                No growth to date.    04-25 @ 08:59 .Urine                50,000 - 99,000 CFU/mL Yeast-like cells, presumptively not Candida  albicans "Susceptibilities not performed"    04-25 @ 08:48 .Blood   PCR    Growth in anaerobic bottle: Gram Negative Rods  Growth in aerobic bottle: Gram Positive Cocci in Clusters    Blood Culture PCR  Klebsiella pneumoniae  Blood Culture PCR  Blood Culture PCR     No growth at 5 days.          RESPIRATORY CULTURES:              RADIOLOGY & ADDITIONAL STUDIES:        Pager [phone]  After 5 pm/weekends or if no response :[phone]

## 2019-05-01 NOTE — PROGRESS NOTE ADULT - ATTENDING COMMENTS
67 yo female remains hospitalized  with PNA  VAC to sacral wound has been suspended 2/2 frequent loose stools   , at bedside, reports diminution in frequency of stools , since started on Lomotil  Currently patient noted to be more alert than on previous visits  Remain bedridden and contracted with recently diagnosed  femur fx immobilized   Non formed stool persists , with soilage of sacral wound dressing  Sacral wound remains large,  with abundant granulation tissue, with no bone currently  exposed  Explained rationale for continued suspension of VAC to , who clearly understands  Remain available 69 yo female remains hospitalized  with PNA  VAC to sacral wound has been suspended 2/2 frequent loose stools   , at bedside, reports diminution in frequency of stools , since started on Lomotil  Currently patient noted to be more alert than on previous visits  Remain bedridden and contracted with recently diagnosed left  femur fx immobilized   Non formed stool persists , with soilage of sacral wound dressing  Sacral wound remains large,  with abundant granulation tissue, with no bone currently  exposed  Explained rationale for continued suspension of VAC to , who clearly understands  Remain available

## 2019-05-01 NOTE — PROGRESS NOTE ADULT - ASSESSMENT
68 yr old with advanced dementia , bed bound, contracted, cva, sp removal of infected hip due to mrsa with placement of space last fall.   Pt has been havening fevers for months.      Sacral stage 4 pressure injury- Aquacel -   Rt Ischium partial thickness wound- healed- CAVILON comfeel  LLE wounds- CAVILON  BLE elevation  Abx per Medicine/ ID  Moisturize intact skin w/ SWEEN cream BID  con't Nutrition (as tolerated), Nutrition Consult  con't Offloading   con't Pericare  Care as per medicine will follow w/ you  Upon discharge [facility] outpatient [facility] [address] [place], [place] [phone]  Seen w/ attng and D/w team  CYN StraussC [id number] [id number]  I spent 25  minutes w/ this pt of which more than 50% of the time was spent counseling & coordinating care of this pt.

## 2019-05-01 NOTE — PROGRESS NOTE ADULT - SUBJECTIVE AND OBJECTIVE BOX
SUBJECTIVE: Pt seen, chart reviewed.     at bedside- all questions asked and answered to his satisfaction  No odor, redness, warmth, excess drainage noted  today no f/c/s  pt more alert, follows w/ eyes & y/n questions  diarrhea improved, loose BM BID but soilig dressing requiring BID dressing changes  (+)incontinent (+)sedentary. Offloading & pericare initiated upon admission & on going   ID suspects there is Osteo of the hip - but changing the spacer is not a realistic option & that chronic Om of the sacrum usually does not cause fevers and does not need prolonged ab therapy   LLE w/ knee immobilizer in place.   relates that immobilizer sometimes moves down her leg resting on her Lt heel- at which time team comes to reposition.      ROS: Skin/ MSK: see HPI  all other systems negative      apixaban 5 milliGRAM(s) Oral every 12 hours  cefTRIAXone   IVPB 1 Gram(s) IV Intermittent every 24 hours  vancomycin  IVPB 1000 milliGRAM(s) IV Intermittent every 24 hours      Physical Exam:  Vital Signs Last 24 Hrs  T(C): 36.8 (01 May 2019 14:14), Max: 36.8 (30 Apr 2019 14:22)  T(F): 98.3 (01 May 2019 14:14), Max: 98.3 (01 May 2019 14:14)  HR: 105 (01 May 2019 14:14) (92 - 105)  BP: 129/75 (01 May 2019 14:14) (104/61 - 129/75)  BP(mean): --  RR: 19 (01 May 2019 14:14) (18 - 19)  SpO2: 95% (01 May 2019 14:14) (95% - 99%)  General Appearance:  NAD, Alert, cachectic, WG   Versa Care P500    Musculoskeletal/Vascular:   no BLE edema   BLE equally warm  no acute ischemia noted  BUE/BLE w/ contractures  LLE w/ immobilizer  Lt heel Evolving DTI w/ intact clear bullae  2cm x 2cm  no drainage, odor, erythema, increased warmth, tenderness, induration, fluctuance    Skin:  dry & frail    Lt Lateral malleolus 1.5cm x 0.8cm x 0cm  dry scab  No drainage, odor, erythema, increased warmth, tenderness, induration, fluctuance    Stage 4 Sacral pressure injury  8cm x 8.3cm x 3cm w/ undermining 9-3 o'clock w/ greatest at 11 of 4cm  moist & granular  scant  nonviable tissue  (+) serosanguinous drainage  Bilateral ischium reepithelialized  No odor, erythema, increased warmth, tenderness, induration, fluctuance          LABS:                        9.4    11.7  )-----------( 476      ( 30 Apr 2019 07:03 )             26.9    CULTURES:  Culture - Blood in AM (04.28.19 @ 11:51)    Gram Stain:   Growth in aerobic bottle: Gram Positive Cocci in Clusters    Specimen Source: .Blood    Culture Results:   Growth in aerobic bottle: Coag Negative Staphylococcus  Single set isolate, possible contaminant. Contact  Microbiology if susceptibility testing clinically  indicated.

## 2019-05-01 NOTE — PROGRESS NOTE ADULT - ASSESSMENT
67 yo with advanced dementia , bed bound, contracted, cva, sp removal of infected hip due to mrsa with placement of space last fall  Pt has been having fevers for months  Suspected ongoing Om of the hip site but changing the spacer is not a realistic option--On Vanco coverage  Chronic Om of the sacrum usually does not cause fevers and does not need prolonged ab therapy  PICC line changed 4/18  Has leukocytosis, intermittent fevers     UA equivocal (UCX pending)  BCX now with K pne S pending  CT with multifocal pna  Source? Does not appears urine source; CT with new pna (although pna rare cause GN bacteremia);    Overall, new K pne bacteremia, MRSA joint infection, leukocytosis, fever, PCN allergy  - Ceftriaxone 1g q 24 (Monitor on first dose; note patient has tolerated cefepime, and ceftolozane in the past)  - Vanco 1g q 24, monitor trough      in view of response and follow up negative bc, will continue current regimen   hold on changing picc at present   discussed with  in detail   to complete course of ceftriaxone for gram negative bacteremia  last set of bc with 1/4  await ID

## 2019-05-01 NOTE — PROGRESS NOTE ADULT - SUBJECTIVE AND OBJECTIVE BOX
MYRIAM HEATH:[id number],   68yFemale followed for:  ASA; dye contrast (Anaphylaxis)  aspirin (Short breath)  divalproex sodium (Other (Unknown))  Flowers and Plants (Short breath)  Haldol (Other (Unknown))  penicillin (Short breath; Rash)  sulfa drugs (Short breath; Rash)  vancomycin (Rash; Urticaria; Hives)  Xanax (Other (Unknown))    PAST MEDICAL & SURGICAL HISTORY:  CVA (cerebral vascular accident)  Fatty pancreas  PNA (pneumonia)  Pulmonary HTN  IGT (impaired glucose tolerance)  Ulcerative colitis  Acid reflux  Anxiety  Depression  Mouth sores  HLD (hyperlipidemia)  Asthma  Humeral head fracture  H/O: hysterectomy  H/O cataract extraction, left  History of knee replacement    FAMILY HISTORY:  Family history of dementia (Grandparent)  Family history of colon cancer (Grandparent)    MEDICATIONS  (STANDING):  ALBUTerol    90 MICROgram(s) HFA Inhaler 1 Puff(s) Inhalation every 4 hours  ALBUTerol/ipratropium for Nebulization 3 milliLiter(s) Nebulizer every 6 hours  apixaban 5 milliGRAM(s) Oral every 12 hours  artificial tears (preservative free) Ophthalmic Solution 1 Drop(s) Both EYES three times a day  ascorbic acid 500 milliGRAM(s) Oral daily  buDESOnide   0.5 milliGRAM(s) Respule 0.5 milliGRAM(s) Inhalation every 12 hours  cefTRIAXone   IVPB 1 Gram(s) IV Intermittent every 24 hours  chlorhexidine 4% Liquid 1 Application(s) Topical <User Schedule>  clonazePAM Tablet 1.5 milliGRAM(s) Oral <User Schedule>  dextrose 5%. 1000 milliLiter(s) (50 mL/Hr) IV Continuous <Continuous>  dextrose 50% Injectable 12.5 Gram(s) IV Push once  dextrose 50% Injectable 25 Gram(s) IV Push once  dextrose 50% Injectable 25 Gram(s) IV Push once  diphenoxylate/atropine 2 Tablet(s) Oral two times a day  famotidine    Tablet 20 milliGRAM(s) Oral daily  ferrous    sulfate Liquid 300 milliGRAM(s) Enteral Tube daily  gabapentin   Solution 300 milliGRAM(s) Oral two times a day  insulin lispro (HumaLOG) corrective regimen sliding scale   SubCutaneous three times a day before meals  insulin lispro (HumaLOG) corrective regimen sliding scale   SubCutaneous at bedtime  multivitamin/minerals 1 Tablet(s) Oral daily  petrolatum white Ointment 1 Application(s) Topical three times a day  predniSONE   Tablet   Oral   predniSONE   Tablet 7.5 milliGRAM(s) Oral daily  QUEtiapine 25 milliGRAM(s) Oral at bedtime  sodium chloride 3%  Inhalation 4 milliLiter(s) Inhalation every 6 hours  tiotropium 18 MICROgram(s) Capsule 1 Capsule(s) Inhalation daily  tiZANidine 4 milliGRAM(s) Oral <User Schedule>  vancomycin  IVPB 1000 milliGRAM(s) IV Intermittent every 24 hours    MEDICATIONS  (PRN):  acetaminophen    Suspension .. 650 milliGRAM(s) Oral every 6 hours PRN Temp greater or equal to 38C (100.4F), Mild Pain (1 - 3), Moderate Pain (4 - 6)  dextrose 40% Gel 15 Gram(s) Oral once PRN Blood Glucose LESS THAN 70 milliGRAM(s)/deciLiter  diphenhydrAMINE   Elixir 25 milliGRAM(s) Oral every 6 hours PRN Rash and/or Itching  glucagon  Injectable 1 milliGRAM(s) IntraMuscular once PRN Glucose <70 milliGRAM(s)/deciLiter  nystatin Powder 1 Application(s) Topical two times a day PRN rash/itchiness  sodium chloride 0.65% Nasal 1 Spray(s) Both Nostrils three times a day PRN Nasal Congestion      Vital Signs Last 24 Hrs  T(C): 36.4 (01 May 2019 06:43), Max: 36.8 (30 Apr 2019 14:22)  T(F): 97.5 (01 May 2019 06:43), Max: 98.2 (30 Apr 2019 14:22)  HR: 92 (01 May 2019 06:43) (92 - 102)  BP: 126/70 (01 May 2019 06:43) (104/61 - 126/70)  BP(mean): --  RR: 18 (01 May 2019 06:43) (18 - 19)  SpO2: 99% (01 May 2019 06:43) (98% - 99%)  nc/at  s1s2  cta  soft, nt, nd no guarding or rebound  no c/c/e    CBC Full  -  ( 30 Apr 2019 07:03 )  WBC Count : 11.7 K/uL  RBC Count : 2.85 M/uL  Hemoglobin : 9.4 g/dL  Hematocrit : 26.9 %  Platelet Count - Automated : 476 K/uL  Mean Cell Volume : 94.5 fl  Mean Cell Hemoglobin : 32.8 pg  Mean Cell Hemoglobin Concentration : 34.7 gm/dL  Auto Neutrophil # : x  Auto Lymphocyte # : x  Auto Monocyte # : x  Auto Eosinophil # : x  Auto Basophil # : x  Auto Neutrophil % : x  Auto Lymphocyte % : x  Auto Monocyte % : x  Auto Eosinophil % : x  Auto Basophil % : x    04-30    135  |  93<L>  |  28<H>  ----------------------------<  125<H>  3.7   |  29  |  <0.30<L>    Ca    8.7      30 Apr 2019 07:00

## 2019-05-01 NOTE — CHART NOTE - NSCHARTNOTEFT_GEN_A_CORE
MYRIAM HEATH    Notified by RN patient with new rash on body    Pt examined by me at bedside. Pt does not appear agitated currently. Notice of a very faint light pink petechial rash on patients chest, arms, and back. she is not exhibiting any signs of distress. The rash was not present this morning.       Pt examined at bedside:   Constitutional: NAD, alert  Respiratory: CTA B/L   Cardiovascular:  S1S2,  Skin: (+) warm, dry, Notice of slight pink petechial rash on arms, chest, back and buttocks.     Vital Signs Last 24 Hrs  T(C): 37.8 (01 May 2019 17:16), Max: 37.8 (01 May 2019 17:16)  T(F): 100.1 (01 May 2019 17:16), Max: 100.1 (01 May 2019 17:16)  HR: 110 (01 May 2019 17:16) (92 - 110)  BP: 122/60 (01 May 2019 17:16) (111/60 - 129/75)  BP(mean): --  RR: 20 (01 May 2019 17:16) (18 - 20)  SpO2: 95% (01 May 2019 17:16) (95% - 99%)        Interventions taken:   - benadryl   -Notified ID. Pt has known allergy to both vancomycin and penicillin. Pt was started on ceftriaxone on 4/29 ( patient has tolerated cefepime, and ceftolozane in the past). She was started on vancomycin on 4/25 with pre-treatement. Per recommendation of ID on call provider d/c ceftriaxone and start aztreonam 2 g q8hrs.   -monitor patient closely.   -continue vanco currently. If rash continues consider discontinuing.  -Dr. Brand to follow up in the morning.   -patient will be signed out to night team for continued monitoring.             Jacqueline PINTO (Medicine PA )

## 2019-05-02 LAB
ANION GAP SERPL CALC-SCNC: 10 MMOL/L — SIGNIFICANT CHANGE UP (ref 5–17)
BLD GP AB SCN SERPL QL: NEGATIVE — SIGNIFICANT CHANGE UP
BUN SERPL-MCNC: 33 MG/DL — HIGH (ref 7–23)
CALCIUM SERPL-MCNC: 8.6 MG/DL — SIGNIFICANT CHANGE UP (ref 8.4–10.5)
CHLORIDE SERPL-SCNC: 91 MMOL/L — LOW (ref 96–108)
CO2 SERPL-SCNC: 31 MMOL/L — SIGNIFICANT CHANGE UP (ref 22–31)
CREAT SERPL-MCNC: <0.3 MG/DL — LOW (ref 0.5–1.3)
GLUCOSE BLDC GLUCOMTR-MCNC: 118 MG/DL — HIGH (ref 70–99)
GLUCOSE BLDC GLUCOMTR-MCNC: 126 MG/DL — HIGH (ref 70–99)
GLUCOSE BLDC GLUCOMTR-MCNC: 204 MG/DL — HIGH (ref 70–99)
GLUCOSE BLDC GLUCOMTR-MCNC: 206 MG/DL — HIGH (ref 70–99)
GLUCOSE BLDC GLUCOMTR-MCNC: 222 MG/DL — HIGH (ref 70–99)
GLUCOSE SERPL-MCNC: 139 MG/DL — HIGH (ref 70–99)
HCT VFR BLD CALC: 24.4 % — LOW (ref 34.5–45)
HCT VFR BLD CALC: 26.7 % — LOW (ref 34.5–45)
HGB BLD-MCNC: 8.3 G/DL — LOW (ref 11.5–15.5)
HGB BLD-MCNC: 9.2 G/DL — LOW (ref 11.5–15.5)
MCHC RBC-ENTMCNC: 32.1 PG — SIGNIFICANT CHANGE UP (ref 27–34)
MCHC RBC-ENTMCNC: 32.3 PG — SIGNIFICANT CHANGE UP (ref 27–34)
MCHC RBC-ENTMCNC: 33.9 GM/DL — SIGNIFICANT CHANGE UP (ref 32–36)
MCHC RBC-ENTMCNC: 34.6 GM/DL — SIGNIFICANT CHANGE UP (ref 32–36)
MCV RBC AUTO: 93.5 FL — SIGNIFICANT CHANGE UP (ref 80–100)
MCV RBC AUTO: 94.5 FL — SIGNIFICANT CHANGE UP (ref 80–100)
PLATELET # BLD AUTO: 465 K/UL — HIGH (ref 150–400)
PLATELET # BLD AUTO: 582 K/UL — HIGH (ref 150–400)
POTASSIUM SERPL-MCNC: 3.7 MMOL/L — SIGNIFICANT CHANGE UP (ref 3.5–5.3)
POTASSIUM SERPL-SCNC: 3.7 MMOL/L — SIGNIFICANT CHANGE UP (ref 3.5–5.3)
RBC # BLD: 2.58 M/UL — LOW (ref 3.8–5.2)
RBC # BLD: 2.85 M/UL — LOW (ref 3.8–5.2)
RBC # FLD: 15.5 % — HIGH (ref 10.3–14.5)
RBC # FLD: 15.7 % — HIGH (ref 10.3–14.5)
RH IG SCN BLD-IMP: NEGATIVE — SIGNIFICANT CHANGE UP
SODIUM SERPL-SCNC: 132 MMOL/L — LOW (ref 135–145)
WBC # BLD: 10.3 K/UL — SIGNIFICANT CHANGE UP (ref 3.8–10.5)
WBC # BLD: 16.6 K/UL — HIGH (ref 3.8–10.5)
WBC # FLD AUTO: 10.3 K/UL — SIGNIFICANT CHANGE UP (ref 3.8–10.5)
WBC # FLD AUTO: 16.6 K/UL — HIGH (ref 3.8–10.5)

## 2019-05-02 PROCEDURE — 99232 SBSQ HOSP IP/OBS MODERATE 35: CPT

## 2019-05-02 RX ORDER — IPRATROPIUM/ALBUTEROL SULFATE 18-103MCG
3 AEROSOL WITH ADAPTER (GRAM) INHALATION EVERY 6 HOURS
Qty: 0 | Refills: 0 | Status: DISCONTINUED | OUTPATIENT
Start: 2019-05-02 | End: 2019-05-06

## 2019-05-02 RX ADMIN — Medication 0.5 MILLIGRAM(S): at 06:02

## 2019-05-02 RX ADMIN — Medication 100 MILLIGRAM(S): at 13:42

## 2019-05-02 RX ADMIN — FAMOTIDINE 20 MILLIGRAM(S): 10 INJECTION INTRAVENOUS at 12:53

## 2019-05-02 RX ADMIN — Medication 1 APPLICATION(S): at 06:05

## 2019-05-02 RX ADMIN — Medication 1 TABLET(S): at 12:49

## 2019-05-02 RX ADMIN — Medication 3 MILLILITER(S): at 06:02

## 2019-05-02 RX ADMIN — Medication 1 APPLICATION(S): at 13:42

## 2019-05-02 RX ADMIN — Medication 2: at 09:59

## 2019-05-02 RX ADMIN — Medication 1 DROP(S): at 13:41

## 2019-05-02 RX ADMIN — Medication 1 DROP(S): at 21:07

## 2019-05-02 RX ADMIN — Medication 3 MILLILITER(S): at 12:51

## 2019-05-02 RX ADMIN — Medication 100 MILLIGRAM(S): at 21:06

## 2019-05-02 RX ADMIN — GABAPENTIN 300 MILLIGRAM(S): 400 CAPSULE ORAL at 21:18

## 2019-05-02 RX ADMIN — SODIUM CHLORIDE 4 MILLILITER(S): 9 INJECTION INTRAMUSCULAR; INTRAVENOUS; SUBCUTANEOUS at 12:51

## 2019-05-02 RX ADMIN — Medication 250 MILLIGRAM(S): at 11:15

## 2019-05-02 RX ADMIN — APIXABAN 5 MILLIGRAM(S): 2.5 TABLET, FILM COATED ORAL at 17:27

## 2019-05-02 RX ADMIN — Medication 300 MILLIGRAM(S): at 12:51

## 2019-05-02 RX ADMIN — SODIUM CHLORIDE 4 MILLILITER(S): 9 INJECTION INTRAMUSCULAR; INTRAVENOUS; SUBCUTANEOUS at 06:03

## 2019-05-02 RX ADMIN — TIZANIDINE 4 MILLIGRAM(S): 4 TABLET ORAL at 10:01

## 2019-05-02 RX ADMIN — Medication 500 MILLIGRAM(S): at 12:50

## 2019-05-02 RX ADMIN — Medication 2 TABLET(S): at 17:27

## 2019-05-02 RX ADMIN — APIXABAN 5 MILLIGRAM(S): 2.5 TABLET, FILM COATED ORAL at 06:02

## 2019-05-02 RX ADMIN — Medication 1 DROP(S): at 06:02

## 2019-05-02 RX ADMIN — TIZANIDINE 4 MILLIGRAM(S): 4 TABLET ORAL at 21:03

## 2019-05-02 RX ADMIN — Medication 2: at 13:40

## 2019-05-02 RX ADMIN — QUETIAPINE FUMARATE 25 MILLIGRAM(S): 200 TABLET, FILM COATED ORAL at 21:03

## 2019-05-02 RX ADMIN — GABAPENTIN 300 MILLIGRAM(S): 400 CAPSULE ORAL at 10:00

## 2019-05-02 RX ADMIN — CHLORHEXIDINE GLUCONATE 1 APPLICATION(S): 213 SOLUTION TOPICAL at 06:04

## 2019-05-02 RX ADMIN — Medication 1.5 MILLIGRAM(S): at 21:07

## 2019-05-02 RX ADMIN — Medication 1 APPLICATION(S): at 23:02

## 2019-05-02 RX ADMIN — Medication 7.5 MILLIGRAM(S): at 06:03

## 2019-05-02 RX ADMIN — Medication 25 MILLIGRAM(S): at 10:01

## 2019-05-02 RX ADMIN — Medication 100 MILLIGRAM(S): at 06:02

## 2019-05-02 RX ADMIN — Medication 2 TABLET(S): at 06:04

## 2019-05-02 RX ADMIN — Medication 0.5 MILLIGRAM(S): at 17:28

## 2019-05-02 NOTE — PROGRESS NOTE ADULT - SUBJECTIVE AND OBJECTIVE BOX
MYRIAM WHITE:[id number],   68yFemale followed for:  ASA; dye contrast (Anaphylaxis)  aspirin (Short breath)  divalproex sodium (Other (Unknown))  Flowers and Plants (Short breath)  Haldol (Other (Unknown))  penicillin (Short breath; Rash)  sulfa drugs (Short breath; Rash)  vancomycin (Rash; Urticaria; Hives)  Xanax (Other (Unknown))    PAST MEDICAL & SURGICAL HISTORY:  CVA (cerebral vascular accident)  Fatty pancreas  PNA (pneumonia)  Pulmonary HTN  IGT (impaired glucose tolerance)  Ulcerative colitis  Acid reflux  Anxiety  Depression  Mouth sores  HLD (hyperlipidemia)  Asthma  Humeral head fracture  H/O: hysterectomy  H/O cataract extraction, left  History of knee replacement    FAMILY HISTORY:  Family history of dementia (Grandparent)  Family history of colon cancer (Grandparent)    MEDICATIONS  (STANDING):  ALBUTerol    90 MICROgram(s) HFA Inhaler 1 Puff(s) Inhalation every 4 hours  ALBUTerol/ipratropium for Nebulization 3 milliLiter(s) Nebulizer every 6 hours  apixaban 5 milliGRAM(s) Oral every 12 hours  artificial tears (preservative free) Ophthalmic Solution 1 Drop(s) Both EYES three times a day  ascorbic acid 500 milliGRAM(s) Oral daily  aztreonam  IVPB 2000 milliGRAM(s) IV Intermittent every 8 hours  buDESOnide   0.5 milliGRAM(s) Respule 0.5 milliGRAM(s) Inhalation every 12 hours  chlorhexidine 4% Liquid 1 Application(s) Topical <User Schedule>  clonazePAM Tablet 1.5 milliGRAM(s) Oral <User Schedule>  dextrose 5%. 1000 milliLiter(s) (50 mL/Hr) IV Continuous <Continuous>  dextrose 50% Injectable 12.5 Gram(s) IV Push once  dextrose 50% Injectable 25 Gram(s) IV Push once  dextrose 50% Injectable 25 Gram(s) IV Push once  diphenoxylate/atropine 2 Tablet(s) Oral two times a day  famotidine    Tablet 20 milliGRAM(s) Oral daily  ferrous    sulfate Liquid 300 milliGRAM(s) Enteral Tube daily  gabapentin   Solution 300 milliGRAM(s) Oral two times a day  insulin lispro (HumaLOG) corrective regimen sliding scale   SubCutaneous three times a day before meals  insulin lispro (HumaLOG) corrective regimen sliding scale   SubCutaneous at bedtime  Medical Marijuana Oil 1 milliLiter(s),Medical Marijuana Oil 1 milliLiter(s) 1 milliLiter(s) Oral <User Schedule>  multivitamin/minerals 1 Tablet(s) Oral daily  petrolatum white Ointment 1 Application(s) Topical three times a day  predniSONE   Tablet   Oral   predniSONE   Tablet 7.5 milliGRAM(s) Oral daily  QUEtiapine 25 milliGRAM(s) Oral at bedtime  sodium chloride 3%  Inhalation 4 milliLiter(s) Inhalation every 6 hours  tiotropium 18 MICROgram(s) Capsule 1 Capsule(s) Inhalation daily  tiZANidine 4 milliGRAM(s) Oral <User Schedule>  vancomycin  IVPB 1000 milliGRAM(s) IV Intermittent every 24 hours    MEDICATIONS  (PRN):  acetaminophen    Suspension .. 650 milliGRAM(s) Oral every 6 hours PRN Temp greater or equal to 38C (100.4F), Mild Pain (1 - 3), Moderate Pain (4 - 6)  dextrose 40% Gel 15 Gram(s) Oral once PRN Blood Glucose LESS THAN 70 milliGRAM(s)/deciLiter  diphenhydrAMINE   Elixir 25 milliGRAM(s) Oral every 6 hours PRN Rash and/or Itching  glucagon  Injectable 1 milliGRAM(s) IntraMuscular once PRN Glucose <70 milliGRAM(s)/deciLiter  nystatin Powder 1 Application(s) Topical two times a day PRN rash/itchiness  sodium chloride 0.65% Nasal 1 Spray(s) Both Nostrils three times a day PRN Nasal Congestion      Vital Signs Last 24 Hrs  T(C): 36.8 (02 May 2019 05:37), Max: 38.2 (01 May 2019 21:00)  T(F): 98.3 (02 May 2019 05:37), Max: 100.8 (01 May 2019 21:00)  HR: 88 (02 May 2019 05:37) (88 - 112)  BP: 123/70 (02 May 2019 05:37) (122/60 - 134/76)  BP(mean): --  RR: 18 (02 May 2019 05:37) (18 - 20)  SpO2: 95% (02 May 2019 05:37) (95% - 95%)  nc/at  s1s2  cta  soft, nt, nd no guarding or rebound  no c/c/e    CBC Full  -  ( 02 May 2019 06:52 )  WBC Count : 10.3 K/uL  RBC Count : 2.58 M/uL  Hemoglobin : 8.3 g/dL  Hematocrit : 24.4 %  Platelet Count - Automated : 465 K/uL  Mean Cell Volume : 94.5 fl  Mean Cell Hemoglobin : 32.1 pg  Mean Cell Hemoglobin Concentration : 33.9 gm/dL  Auto Neutrophil # : x  Auto Lymphocyte # : x  Auto Monocyte # : x  Auto Eosinophil # : x  Auto Basophil # : x  Auto Neutrophil % : x  Auto Lymphocyte % : x  Auto Monocyte % : x  Auto Eosinophil % : x  Auto Basophil % : x    05-02    132<L>  |  91<L>  |  33<H>  ----------------------------<  139<H>  3.7   |  31  |  <0.30<L>    Ca    8.6      02 May 2019 06:50

## 2019-05-02 NOTE — PROGRESS NOTE ADULT - ASSESSMENT
67 yo with advanced dementia , bed bound, contracted, cva, sp removal of infected hip due to mrsa with placement of space last fall  Pt has been having fevers for months  Suspected ongoing Om of the hip site but changing the spacer is not a realistic option--On Vanco coverage  Chronic Om of the sacrum usually does not cause fevers and does not need prolonged ab therapy  PICC line changed 4/18  Has leukocytosis, intermittent fevers     UA equivocal (UCX pending)  BCX now with K pne S pending  CT with multifocal pna  Source? Does not appears urine source; CT with new pna (although pna rare cause GN bacteremia);    Overall, new K pne bacteremia, MRSA joint infection, leukocytosis, fever, PCN allergy  - Ceftriaxone 1g q 24 (Monitor on first dose; note patient has tolerated cefepime, and ceftolozane in the past)  - Vanco 1g q 24, monitor trough      in view of response and follow up negative bc, will continue current regimen   hold on changing picc at present   discussed with  in detail   to complete course of ceftriaxone for gram negative bacteremia on friday   low grade fever present  recultrue if she becomes septic 69 yo with advanced dementia , bed bound, contracted, cva, sp removal of infected hip due to mrsa with placement of space last fall  Pt has been having fevers for months  Suspected ongoing Om of the hip site but changing the spacer is not a realistic option--On Vanco coverage  Chronic Om of the sacrum usually does not cause fevers and does not need prolonged ab therapy  PICC line changed 4/18  Has leukocytosis, intermittent fevers     UA equivocal (UCX pending)  BCX now with K pne S pending  CT with multifocal pna  Source? Does not appears urine source; CT with new pna (although pna rare cause GN bacteremia);    Overall, new K pne bacteremia, MRSA joint infection, leukocytosis, fever, PCN allergy  - Ceftriaxone 1g q 24 (Monitor on first dose; note patient has tolerated cefepime, and ceftolozane in the past)  - Vanco 1g q 24, monitor trough      in view of response and follow up negative bc, will continue current regimen   hold on changing picc at present   discussed with  in detail   to complete course of aztreonam  ( changed from ceftriaxone due to rash)  for gram negative bacteremia on friday   low grade fever present  reculture if she becomes septic

## 2019-05-02 NOTE — PROGRESS NOTE ADULT - SUBJECTIVE AND OBJECTIVE BOX
infectious diseases progress note:    Patient is a 68y old  Female who presents with a chief complaint of fevers (02 May 2019 07:47)        Fever             Allergies    ASA; dye contrast (Anaphylaxis)  aspirin (Short breath)  divalproex sodium (Other (Unknown))  Flowers and Plants (Short breath)  Haldol (Other (Unknown))  penicillin (Short breath; Rash)  sulfa drugs (Short breath; Rash)  vancomycin (Rash; Urticaria; Hives)  Xanax (Other (Unknown))    Intolerances        ANTIBIOTICS/RELEVANT:  antimicrobials  aztreonam  IVPB 2000 milliGRAM(s) IV Intermittent every 8 hours  vancomycin  IVPB 1000 milliGRAM(s) IV Intermittent every 24 hours    immunologic:    OTHER:  acetaminophen    Suspension .. 650 milliGRAM(s) Oral every 6 hours PRN  ALBUTerol    90 MICROgram(s) HFA Inhaler 1 Puff(s) Inhalation every 4 hours  ALBUTerol/ipratropium for Nebulization 3 milliLiter(s) Nebulizer every 6 hours  apixaban 5 milliGRAM(s) Oral every 12 hours  artificial tears (preservative free) Ophthalmic Solution 1 Drop(s) Both EYES three times a day  ascorbic acid 500 milliGRAM(s) Oral daily  buDESOnide   0.5 milliGRAM(s) Respule 0.5 milliGRAM(s) Inhalation every 12 hours  chlorhexidine 4% Liquid 1 Application(s) Topical <User Schedule>  clonazePAM Tablet 1.5 milliGRAM(s) Oral <User Schedule>  dextrose 40% Gel 15 Gram(s) Oral once PRN  dextrose 5%. 1000 milliLiter(s) IV Continuous <Continuous>  dextrose 50% Injectable 12.5 Gram(s) IV Push once  dextrose 50% Injectable 25 Gram(s) IV Push once  dextrose 50% Injectable 25 Gram(s) IV Push once  diphenhydrAMINE   Elixir 25 milliGRAM(s) Oral every 6 hours PRN  diphenoxylate/atropine 2 Tablet(s) Oral two times a day  famotidine    Tablet 20 milliGRAM(s) Oral daily  ferrous    sulfate Liquid 300 milliGRAM(s) Enteral Tube daily  gabapentin   Solution 300 milliGRAM(s) Oral two times a day  glucagon  Injectable 1 milliGRAM(s) IntraMuscular once PRN  insulin lispro (HumaLOG) corrective regimen sliding scale   SubCutaneous three times a day before meals  insulin lispro (HumaLOG) corrective regimen sliding scale   SubCutaneous at bedtime  Medical Marijuana Oil 1 milliLiter(s),Medical Marijuana Oil 1 milliLiter(s) 1 milliLiter(s) Oral <User Schedule>  multivitamin/minerals 1 Tablet(s) Oral daily  nystatin Powder 1 Application(s) Topical two times a day PRN  petrolatum white Ointment 1 Application(s) Topical three times a day  predniSONE   Tablet   Oral   predniSONE   Tablet 7.5 milliGRAM(s) Oral daily  QUEtiapine 25 milliGRAM(s) Oral at bedtime  sodium chloride 0.65% Nasal 1 Spray(s) Both Nostrils three times a day PRN  sodium chloride 3%  Inhalation 4 milliLiter(s) Inhalation every 6 hours  tiotropium 18 MICROgram(s) Capsule 1 Capsule(s) Inhalation daily  tiZANidine 4 milliGRAM(s) Oral <User Schedule>      Objective:  Vital Signs Last 24 Hrs  T(C): 36.8 (02 May 2019 05:37), Max: 38.2 (01 May 2019 21:00)  T(F): 98.3 (02 May 2019 05:37), Max: 100.8 (01 May 2019 21:00)  HR: 88 (02 May 2019 05:37) (88 - 112)  BP: 123/70 (02 May 2019 05:37) (122/60 - 134/76)  BP(mean): --  RR: 18 (02 May 2019 05:37) (18 - 20)  SpO2: 95% (02 May 2019 05:37) (95% - 95%)    PHYSICAL EXAM:     Eyes:JACQUELIN, EOMI  Ear/Nose/Throat: no oral lesion, no sinus tenderness on percussion	  Neck:no JVD, no lymphadenopathy, supple  Respiratory: CTA maryjane  Cardiovascular: S1S2 RRR, no murmurs  Gastrointestinal:soft, (+) BS, no HSM  Extremities: contracted         LABS:                        8.3    10.3  )-----------( 465      ( 02 May 2019 06:52 )             24.4     05-02    132<L>  |  91<L>  |  33<H>  ----------------------------<  139<H>  3.7   |  31  |  <0.30<L>    Ca    8.6      02 May 2019 06:50              MICROBIOLOGY:    RECENT CULTURES:  04-28 @ 11:51 .Blood       Growth in aerobic bottle: Gram Positive Cocci in Clusters           No growth to date.    04-26 @ 14:11 .Blood                No growth at 5 days.    04-25 @ 08:59 .Urine                50,000 - 99,000 CFU/mL Yeast-like cells, presumptively not Candida  albicans "Susceptibilities not performed"    04-25 @ 08:48 .Blood   PCR    Growth in anaerobic bottle: Gram Negative Rods  Growth in aerobic bottle: Gram Positive Cocci in Clusters    Blood Culture PCR  Klebsiella pneumoniae  Blood Culture PCR  Blood Culture PCR     No growth at 5 days.          RESPIRATORY CULTURES:              RADIOLOGY & ADDITIONAL STUDIES:        Pager [phone]  After 5 pm/weekends or if no response :[phone]

## 2019-05-02 NOTE — PROGRESS NOTE ADULT - SUBJECTIVE AND OBJECTIVE BOX
---___---___---___---___---___---___ ---___---___---___---___---___---___---___---___---___---                    <<<  M E D I C A L   A T T E N D I N G    F O L L O W    U P   N O T E  >>>    remains stable dc rocephin for aztreonam    ---___---___---___---___---___---      <<<  MEDICATIONS:  >>>    MEDICATIONS  (STANDING):  ALBUTerol    90 MICROgram(s) HFA Inhaler 1 Puff(s) Inhalation every 4 hours  ALBUTerol/ipratropium for Nebulization 3 milliLiter(s) Nebulizer every 6 hours  apixaban 5 milliGRAM(s) Oral every 12 hours  artificial tears (preservative free) Ophthalmic Solution 1 Drop(s) Both EYES three times a day  ascorbic acid 500 milliGRAM(s) Oral daily  aztreonam  IVPB 2000 milliGRAM(s) IV Intermittent every 8 hours  buDESOnide   0.5 milliGRAM(s) Respule 0.5 milliGRAM(s) Inhalation every 12 hours  chlorhexidine 4% Liquid 1 Application(s) Topical <User Schedule>  clonazePAM Tablet 1.5 milliGRAM(s) Oral <User Schedule>  dextrose 5%. 1000 milliLiter(s) (50 mL/Hr) IV Continuous <Continuous>  dextrose 50% Injectable 12.5 Gram(s) IV Push once  dextrose 50% Injectable 25 Gram(s) IV Push once  dextrose 50% Injectable 25 Gram(s) IV Push once  diphenoxylate/atropine 2 Tablet(s) Oral two times a day  famotidine    Tablet 20 milliGRAM(s) Oral daily  ferrous    sulfate Liquid 300 milliGRAM(s) Enteral Tube daily  gabapentin   Solution 300 milliGRAM(s) Oral two times a day  insulin lispro (HumaLOG) corrective regimen sliding scale   SubCutaneous three times a day before meals  insulin lispro (HumaLOG) corrective regimen sliding scale   SubCutaneous at bedtime  Medical Marijuana Oil 1 milliLiter(s),Medical Marijuana Oil 1 milliLiter(s) 1 milliLiter(s) Oral <User Schedule>  multivitamin/minerals 1 Tablet(s) Oral daily  petrolatum white Ointment 1 Application(s) Topical three times a day  predniSONE   Tablet   Oral   predniSONE   Tablet 7.5 milliGRAM(s) Oral daily  QUEtiapine 25 milliGRAM(s) Oral at bedtime  sodium chloride 3%  Inhalation 4 milliLiter(s) Inhalation every 6 hours  tiotropium 18 MICROgram(s) Capsule 1 Capsule(s) Inhalation daily  tiZANidine 4 milliGRAM(s) Oral <User Schedule>  vancomycin  IVPB 1000 milliGRAM(s) IV Intermittent every 24 hours      MEDICATIONS  (PRN):  acetaminophen    Suspension .. 650 milliGRAM(s) Oral every 6 hours PRN Temp greater or equal to 38C (100.4F), Mild Pain (1 - 3), Moderate Pain (4 - 6)  dextrose 40% Gel 15 Gram(s) Oral once PRN Blood Glucose LESS THAN 70 milliGRAM(s)/deciLiter  diphenhydrAMINE   Elixir 25 milliGRAM(s) Oral every 6 hours PRN Rash and/or Itching  glucagon  Injectable 1 milliGRAM(s) IntraMuscular once PRN Glucose <70 milliGRAM(s)/deciLiter  nystatin Powder 1 Application(s) Topical two times a day PRN rash/itchiness  sodium chloride 0.65% Nasal 1 Spray(s) Both Nostrils three times a day PRN Nasal Congestion       ---___---___---___---___---___---     <<<REVIEW OF SYSTEM: >>>    unable to obtain      ---___---___---___---___---___---          <<<  VITAL SIGNS: >>>    T(F): 98.3 (05-02-19 @ 05:37), Max: 100.8 (05-01-19 @ 21:00)  HR: 88 (05-02-19 @ 05:37) (88 - 112)  BP: 123/70 (05-02-19 @ 05:37) (122/60 - 134/76)  RR: 18 (05-02-19 @ 05:37) (18 - 20)  SpO2: 95% (05-02-19 @ 05:37) (95% - 95%)  Wt(kg): --  CAPILLARY BLOOD GLUCOSE      POCT Blood Glucose.: 140 mg/dL (01 May 2019 21:54)    I&O's Summary    01 May 2019 07:01  -  02 May 2019 07:00  --------------------------------------------------------  IN: 1560 mL / OUT: 700 mL / NET: 860 mL         ---___---___---___---___---___---                       PHYSICAL EXAM:    GEN: A&O X 1 , NAD , comfortable  HEENT: NCAT, PERRL, MMM, no scleral icterus, hearing intact  NECK: Supple, No JVD  CVS: S1S2 , regular , No M/R/G appreciated  PULM: CTA B/L,  no W/R/R appreciated  ABD.: soft. non tender, non distended,  bowel sounds present peg noted   Extrem: intact pulses , no edema noted left leg in brace   Derm: No rash or ecchymosis noted stage 4 sacral decubitus   PSYCH: normal mood, no depression, not anxious      ---___---___---___---___---___---     <<<  LAB AND IMAGING: >>>                          8.3    10.3  )-----------( 465      ( 02 May 2019 06:52 )             24.4               05-02    132<L>  |  91<L>  |  33<H>  ----------------------------<  139<H>  3.7   |  31  |  <0.30<L>    Ca    8.6      02 May 2019 06:50                                 [All pertinent / recent available Imaging reports and other labs reviewed]     ---___---___---___---___---___---___ ---___---___---___---___---           <<<  A S S E S S M E N T   A N D   P L A N :  >>>    pneumonia  contiue aztreonam till completion   chronic om in right femur spacer  on vancomycin through picc  agitation  contine klonopin and seroquel  dementia  stable  -GI/DVT Prophylaxis. on eliquis  asthma  improving  chf  stable  chronic pain continue medical marijuana   ---------  -----------------------------------  Case discussed with  kendra mya  Education given on     >>______________________<<      Deniz Bolden .         phone   [phone]

## 2019-05-02 NOTE — CHART NOTE - NSCHARTNOTEFT_GEN_A_CORE
Nutrition Follow Up Note  Patient seen for: follow up     Reason for Admission	fevers      69 yo with advanced dementia , bed bound, contracted, cva, sp removal of infected hip due to mrsa with placement of space last fall  Pt has been having fevers for months    pt has been receiving antibiotics for a long time and has had diarrhea for a long time. BM are now pasty.      Source: chart, nurse,     Diet : ENTERAL,NPO/ENTERAL    Patient reports: pt is unable to speak,  speaks to manage her care  banatrol is being accumulated at bedside. it had been stopped since last week. nurse reports since BM have become pasty  no longer wants the banatrol. discontinued in [facility]. provider to RN already discontinued.         Enteral /Parenteral Nutrition: Glucerna 1.2   240ml 4 x daily @ 8am, 12pm, 4pm and 8pm  TF prosource @ 8am and 4pm  Danactive @ 12pm and 8pm      Daily Weight in k.8 (), Weight in k.4 (), Weight in k.7 (), Weight in k.7 ()  % Weight Change: 3% loss since 3/27    Pertinent Medications: MEDICATIONS  (STANDING):  ALBUTerol    90 MICROgram(s) HFA Inhaler 1 Puff(s) Inhalation every 4 hours  ALBUTerol/ipratropium for Nebulization 3 milliLiter(s) Nebulizer every 6 hours  apixaban 5 milliGRAM(s) Oral every 12 hours  artificial tears (preservative free) Ophthalmic Solution 1 Drop(s) Both EYES three times a day  ascorbic acid 500 milliGRAM(s) Oral daily  aztreonam  IVPB 2000 milliGRAM(s) IV Intermittent every 8 hours  buDESOnide   0.5 milliGRAM(s) Respule 0.5 milliGRAM(s) Inhalation every 12 hours  chlorhexidine 4% Liquid 1 Application(s) Topical <User Schedule>  clonazePAM Tablet 1.5 milliGRAM(s) Oral <User Schedule>  dextrose 5%. 1000 milliLiter(s) (50 mL/Hr) IV Continuous <Continuous>  dextrose 50% Injectable 12.5 Gram(s) IV Push once  dextrose 50% Injectable 25 Gram(s) IV Push once  dextrose 50% Injectable 25 Gram(s) IV Push once  diphenoxylate/atropine 2 Tablet(s) Oral two times a day  famotidine    Tablet 20 milliGRAM(s) Oral daily  ferrous    sulfate Liquid 300 milliGRAM(s) Enteral Tube daily  gabapentin   Solution 300 milliGRAM(s) Oral two times a day  insulin lispro (HumaLOG) corrective regimen sliding scale   SubCutaneous three times a day before meals  insulin lispro (HumaLOG) corrective regimen sliding scale   SubCutaneous at bedtime  Medical Marijuana Oil 1 milliLiter(s),Medical Marijuana Oil 1 milliLiter(s) 1 milliLiter(s) Oral <User Schedule>  multivitamin/minerals 1 Tablet(s) Oral daily  petrolatum white Ointment 1 Application(s) Topical three times a day  predniSONE   Tablet   Oral   predniSONE   Tablet 7.5 milliGRAM(s) Oral daily  QUEtiapine 25 milliGRAM(s) Oral at bedtime  sodium chloride 3%  Inhalation 4 milliLiter(s) Inhalation every 6 hours  tiotropium 18 MICROgram(s) Capsule 1 Capsule(s) Inhalation daily  tiZANidine 4 milliGRAM(s) Oral <User Schedule>  vancomycin  IVPB 1000 milliGRAM(s) IV Intermittent every 24 hours    MEDICATIONS  (PRN):  acetaminophen    Suspension .. 650 milliGRAM(s) Oral every 6 hours PRN Temp greater or equal to 38C (100.4F), Mild Pain (1 - 3), Moderate Pain (4 - 6)  dextrose 40% Gel 15 Gram(s) Oral once PRN Blood Glucose LESS THAN 70 milliGRAM(s)/deciLiter  diphenhydrAMINE   Elixir 25 milliGRAM(s) Oral every 6 hours PRN Rash and/or Itching  glucagon  Injectable 1 milliGRAM(s) IntraMuscular once PRN Glucose <70 milliGRAM(s)/deciLiter  nystatin Powder 1 Application(s) Topical two times a day PRN rash/itchiness  sodium chloride 0.65% Nasal 1 Spray(s) Both Nostrils three times a day PRN Nasal Congestion    Pertinent Labs:  @ 06:50: Na 132<L>, BUN 33<H>, Cr <0.30<L>, <H>, K+ 3.7  Finger Sticks:  POCT Blood Glucose.: 206 mg/dL ( @ 08:53)  POCT Blood Glucose.: 140 mg/dL ( @ 21:54)  POCT Blood Glucose.: 134 mg/dL ( @ 18:00)  POCT Blood Glucose.: 185 mg/dL ( @ 14:00)      Skin per nursing documentation: stage 4 sacrum, stage 3 right hip  Edema: +1 left knee, right knee    Estimated Needs:   [x ] no change since previous assessment  [ ] recalculated:     Previous Nutrition Diagnosis:  Increased nutrient needs (specify); calories/protein  Nutrition Diagnosis is: ongoing-being addressed with supplement         Interventions:     Recommend  1) Glucerna 1.2  ( 4 cans daily (240ml)). provides 1140kcal/57grams protein. 27kcal/kg and 1.4grams protein/kg. based on IBW of 42kg. fluid deferred to MD  2)continue Danactive x 2 daily  3)continue tube feed pro source x 2 daily  4)continue multivitamin and VitC daily      Monitoring and Evaluation:     Continue to monitor Nutritional intake, Tolerance to diet prescription, weights, labs, skin integrity    RD remains available upon request and will follow up per protocol  Anne Marie Morgan MA, RD, CDN #[phone]

## 2019-05-03 LAB
ANION GAP SERPL CALC-SCNC: 8 MMOL/L — SIGNIFICANT CHANGE UP (ref 5–17)
BUN SERPL-MCNC: 31 MG/DL — HIGH (ref 7–23)
CALCIUM SERPL-MCNC: 8.8 MG/DL — SIGNIFICANT CHANGE UP (ref 8.4–10.5)
CHLORIDE SERPL-SCNC: 88 MMOL/L — LOW (ref 96–108)
CO2 SERPL-SCNC: 28 MMOL/L — SIGNIFICANT CHANGE UP (ref 22–31)
CREAT SERPL-MCNC: <0.3 MG/DL — LOW (ref 0.5–1.3)
CULTURE RESULTS: SIGNIFICANT CHANGE UP
FERRITIN SERPL-MCNC: 532 NG/ML — HIGH (ref 15–150)
GLUCOSE BLDC GLUCOMTR-MCNC: 110 MG/DL — HIGH (ref 70–99)
GLUCOSE BLDC GLUCOMTR-MCNC: 146 MG/DL — HIGH (ref 70–99)
GLUCOSE BLDC GLUCOMTR-MCNC: 267 MG/DL — HIGH (ref 70–99)
GLUCOSE BLDC GLUCOMTR-MCNC: 76 MG/DL — SIGNIFICANT CHANGE UP (ref 70–99)
GLUCOSE BLDC GLUCOMTR-MCNC: 97 MG/DL — SIGNIFICANT CHANGE UP (ref 70–99)
GLUCOSE SERPL-MCNC: 113 MG/DL — HIGH (ref 70–99)
HCT VFR BLD CALC: 35.2 % — SIGNIFICANT CHANGE UP (ref 34.5–45)
HGB BLD-MCNC: 10.9 G/DL — LOW (ref 11.5–15.5)
IRON SATN MFR SERPL: 15 % — SIGNIFICANT CHANGE UP (ref 14–50)
IRON SATN MFR SERPL: 34 UG/DL — SIGNIFICANT CHANGE UP (ref 30–160)
MCHC RBC-ENTMCNC: 28.7 PG — SIGNIFICANT CHANGE UP (ref 27–34)
MCHC RBC-ENTMCNC: 31.1 GM/DL — LOW (ref 32–36)
MCV RBC AUTO: 92.4 FL — SIGNIFICANT CHANGE UP (ref 80–100)
PLATELET # BLD AUTO: 527 K/UL — HIGH (ref 150–400)
POTASSIUM SERPL-MCNC: 3.7 MMOL/L — SIGNIFICANT CHANGE UP (ref 3.5–5.3)
POTASSIUM SERPL-SCNC: 3.7 MMOL/L — SIGNIFICANT CHANGE UP (ref 3.5–5.3)
RBC # BLD: 3.81 M/UL — SIGNIFICANT CHANGE UP (ref 3.8–5.2)
RBC # FLD: 15.7 % — HIGH (ref 10.3–14.5)
SODIUM SERPL-SCNC: 124 MMOL/L — LOW (ref 135–145)
SPECIMEN SOURCE: SIGNIFICANT CHANGE UP
TIBC SERPL-MCNC: 222 UG/DL — SIGNIFICANT CHANGE UP (ref 220–430)
UIBC SERPL-MCNC: 188 UG/DL — SIGNIFICANT CHANGE UP (ref 110–370)
WBC # BLD: 9 K/UL — SIGNIFICANT CHANGE UP (ref 3.8–10.5)
WBC # FLD AUTO: 9 K/UL — SIGNIFICANT CHANGE UP (ref 3.8–10.5)

## 2019-05-03 PROCEDURE — 99232 SBSQ HOSP IP/OBS MODERATE 35: CPT

## 2019-05-03 RX ORDER — SODIUM CHLORIDE 9 MG/ML
1 INJECTION INTRAMUSCULAR; INTRAVENOUS; SUBCUTANEOUS ONCE
Qty: 0 | Refills: 0 | Status: COMPLETED | OUTPATIENT
Start: 2019-05-03 | End: 2019-05-03

## 2019-05-03 RX ORDER — DIPHENOXYLATE HCL/ATROPINE 2.5-.025MG
2 TABLET ORAL
Qty: 0 | Refills: 0 | Status: DISCONTINUED | OUTPATIENT
Start: 2019-05-03 | End: 2019-05-05

## 2019-05-03 RX ADMIN — Medication 2 TABLET(S): at 17:32

## 2019-05-03 RX ADMIN — Medication 650 MILLIGRAM(S): at 02:10

## 2019-05-03 RX ADMIN — Medication 0.5 MILLIGRAM(S): at 07:00

## 2019-05-03 RX ADMIN — Medication 250 MILLIGRAM(S): at 10:54

## 2019-05-03 RX ADMIN — Medication 0.5 MILLIGRAM(S): at 17:30

## 2019-05-03 RX ADMIN — APIXABAN 5 MILLIGRAM(S): 2.5 TABLET, FILM COATED ORAL at 06:59

## 2019-05-03 RX ADMIN — CHLORHEXIDINE GLUCONATE 1 APPLICATION(S): 213 SOLUTION TOPICAL at 11:27

## 2019-05-03 RX ADMIN — Medication 1 TABLET(S): at 09:07

## 2019-05-03 RX ADMIN — Medication 650 MILLIGRAM(S): at 20:10

## 2019-05-03 RX ADMIN — Medication 7.5 MILLIGRAM(S): at 07:00

## 2019-05-03 RX ADMIN — TIZANIDINE 4 MILLIGRAM(S): 4 TABLET ORAL at 09:07

## 2019-05-03 RX ADMIN — Medication 100 MILLIGRAM(S): at 06:50

## 2019-05-03 RX ADMIN — Medication 1 APPLICATION(S): at 07:49

## 2019-05-03 RX ADMIN — Medication 2 TABLET(S): at 07:03

## 2019-05-03 RX ADMIN — Medication 1 DROP(S): at 13:12

## 2019-05-03 RX ADMIN — SODIUM CHLORIDE 1 GRAM(S): 9 INJECTION INTRAMUSCULAR; INTRAVENOUS; SUBCUTANEOUS at 15:02

## 2019-05-03 RX ADMIN — Medication 500 MILLIGRAM(S): at 09:07

## 2019-05-03 RX ADMIN — Medication 25 MILLIGRAM(S): at 20:10

## 2019-05-03 RX ADMIN — Medication 25 MILLIGRAM(S): at 09:07

## 2019-05-03 RX ADMIN — GABAPENTIN 300 MILLIGRAM(S): 400 CAPSULE ORAL at 20:11

## 2019-05-03 RX ADMIN — Medication 1 APPLICATION(S): at 13:12

## 2019-05-03 RX ADMIN — Medication 3: at 13:11

## 2019-05-03 RX ADMIN — Medication 1 DROP(S): at 21:40

## 2019-05-03 RX ADMIN — Medication 300 MILLIGRAM(S): at 09:07

## 2019-05-03 RX ADMIN — Medication 1 APPLICATION(S): at 21:42

## 2019-05-03 RX ADMIN — Medication 1.5 MILLIGRAM(S): at 22:32

## 2019-05-03 RX ADMIN — APIXABAN 5 MILLIGRAM(S): 2.5 TABLET, FILM COATED ORAL at 17:30

## 2019-05-03 RX ADMIN — QUETIAPINE FUMARATE 25 MILLIGRAM(S): 200 TABLET, FILM COATED ORAL at 22:32

## 2019-05-03 RX ADMIN — TIZANIDINE 4 MILLIGRAM(S): 4 TABLET ORAL at 21:39

## 2019-05-03 RX ADMIN — GABAPENTIN 300 MILLIGRAM(S): 400 CAPSULE ORAL at 13:12

## 2019-05-03 RX ADMIN — FAMOTIDINE 20 MILLIGRAM(S): 10 INJECTION INTRAVENOUS at 09:07

## 2019-05-03 RX ADMIN — Medication 1 DROP(S): at 06:59

## 2019-05-03 NOTE — PROGRESS NOTE ADULT - ASSESSMENT
having 2 BMs daily with lomotil, loose yesterday but reacted to abx  continue lomotil and follow clinical course

## 2019-05-03 NOTE — PROVIDER CONTACT NOTE (OTHER) - ACTION/TREATMENT ORDERED:
PA made aware. Pt placed back telemetry monitor, Ordered metoprolol x1. Will continue to monitor patient.
ordering  ml bolus IV. will continue to monitor the patient.
Benadryl 25 mg via PEG given, PA notified and aware, waiting for ID's recommendations, continue to monitor
NP aware & at patient's bedside assessing patient & reviewed orders & labs. Infectious Disease consulted. BENJI Pierre ordered Duoneb.
NP aware & ordered to continue to monitor & continue Oral Suction as needed. No new orders at this time.
NP aware & reviewed patient's Lab Culture - Blood results, all other lab results & orders. MD notified and aware. Continue with Vancomycin as ordered as per NP. Infectious Disease team was consulted.
NP aware. Will order and come place at bedside.
NP notified and aware. Give 325mg Acetaminophen STAT and Benadryl 25mg IV STAT. May continue to give the patient blood transfusion as per NP. Will continue to monitor.
No blood culture ordered. Give tylenol. Pt on vancomycin for MRSA
PA aware & at patient's bedside assessing patient. PA reviewed orders, lab results, history, plan, heart rate & rhythm on the monitor. PA ordered potassium chloride solution & no additional orders.
PA aware and assessed pt at bedside. 500mg IV tylenol ordered. Continue to monitor.
PA aware. 40meq KCLx2 ordered. Continue to monitor on tele.
PA aware. Follow protocol for PEG tube feed residual. Spouse refusing bolus feed for pt at this time; resume schedule in AM. Continue to monitor.
PA aware. Give PRN dose of tylenol via PEG. Cooling measures with hyperthermia blanket. Continue to monitor.
PA notified and aware. Give pt. tylenol to control fever. Blood cultures, UA, UC, and stat CXR ordered. Continue vanco. Continue to monitor pt.
Seen by PA uric acid ordered for am
Will continue to monitor the patient. awaiting for order clarification. Luis QUEEN reviewed the nutrition consult and agreed to give 237ml at 2030.
MD aware & at patient's bedside assessing patient & educating patient's . Patient's  continues to refuse Right Z-float boot. MD ordered to document patient's  refuses.

## 2019-05-03 NOTE — PROGRESS NOTE ADULT - SUBJECTIVE AND OBJECTIVE BOX
infectious diseases progress note:    Patient is a 68y old  Female who presents with a chief complaint of fevers (03 May 2019 06:44)        Fever        ROS:  CONSTITUTIONAL:  Negative fever or chills, feels well, good appetite  EYES:  Negative  blurry vision or double vision  CARDIOVASCULAR:  Negative for chest pain or palpitations  RESPIRATORY:  Negative for cough, wheezing, or SOB   GASTROINTESTINAL:  Negative for nausea, vomiting, diarrhea, constipation, or abdominal pain  GENITOURINARY:  Negative frequency, urgency or dysuria  NEUROLOGIC:  No headache, confusion, dizziness, lightheadedness    Allergies    ASA; dye contrast (Anaphylaxis)  aspirin (Short breath)  divalproex sodium (Other (Unknown))  Flowers and Plants (Short breath)  Haldol (Other (Unknown))  penicillin (Short breath; Rash)  sulfa drugs (Short breath; Rash)  vancomycin (Rash; Urticaria; Hives)  Xanax (Other (Unknown))    Intolerances        ANTIBIOTICS/RELEVANT:  antimicrobials  aztreonam  IVPB 2000 milliGRAM(s) IV Intermittent every 8 hours  vancomycin  IVPB 1000 milliGRAM(s) IV Intermittent every 24 hours    immunologic:    OTHER:  acetaminophen    Suspension .. 650 milliGRAM(s) Oral every 6 hours PRN  ALBUTerol    90 MICROgram(s) HFA Inhaler 1 Puff(s) Inhalation every 4 hours  ALBUTerol/ipratropium for Nebulization 3 milliLiter(s) Nebulizer every 6 hours PRN  apixaban 5 milliGRAM(s) Oral every 12 hours  artificial tears (preservative free) Ophthalmic Solution 1 Drop(s) Both EYES three times a day  ascorbic acid 500 milliGRAM(s) Oral daily  buDESOnide   0.5 milliGRAM(s) Respule 0.5 milliGRAM(s) Inhalation every 12 hours  chlorhexidine 4% Liquid 1 Application(s) Topical <User Schedule>  clonazePAM Tablet 1.5 milliGRAM(s) Oral <User Schedule>  dextrose 40% Gel 15 Gram(s) Oral once PRN  dextrose 5%. 1000 milliLiter(s) IV Continuous <Continuous>  dextrose 50% Injectable 12.5 Gram(s) IV Push once  dextrose 50% Injectable 25 Gram(s) IV Push once  dextrose 50% Injectable 25 Gram(s) IV Push once  diphenhydrAMINE   Elixir 25 milliGRAM(s) Oral every 6 hours PRN  diphenoxylate/atropine 2 Tablet(s) Oral two times a day  famotidine    Tablet 20 milliGRAM(s) Oral daily  ferrous    sulfate Liquid 300 milliGRAM(s) Enteral Tube daily  gabapentin   Solution 300 milliGRAM(s) Oral two times a day  glucagon  Injectable 1 milliGRAM(s) IntraMuscular once PRN  insulin lispro (HumaLOG) corrective regimen sliding scale   SubCutaneous three times a day before meals  insulin lispro (HumaLOG) corrective regimen sliding scale   SubCutaneous at bedtime  Medical Marijuana Oil 1 milliLiter(s),Medical Marijuana Oil 1 milliLiter(s) 1 milliLiter(s) Oral <User Schedule>  multivitamin/minerals 1 Tablet(s) Oral daily  nystatin Powder 1 Application(s) Topical two times a day PRN  petrolatum white Ointment 1 Application(s) Topical three times a day  predniSONE   Tablet   Oral   predniSONE   Tablet 7.5 milliGRAM(s) Oral daily  QUEtiapine 25 milliGRAM(s) Oral at bedtime  sodium chloride 0.65% Nasal 1 Spray(s) Both Nostrils three times a day PRN  tiotropium 18 MICROgram(s) Capsule 1 Capsule(s) Inhalation daily  tiZANidine 4 milliGRAM(s) Oral <User Schedule>      Objective:  Vital Signs Last 24 Hrs  T(C): 36.6 (03 May 2019 06:50), Max: 36.6 (02 May 2019 20:48)  T(F): 97.9 (03 May 2019 06:50), Max: 97.9 (02 May 2019 20:48)  HR: 98 (03 May 2019 06:50) (98 - 105)  BP: 124/75 (03 May 2019 06:50) (120/70 - 143/80)  BP(mean): --  RR: 18 (03 May 2019 06:50) (18 - 19)  SpO2: 94% (03 May 2019 06:50) (94% - 96%)    PHYSICAL EXAM:   -no acute distress  Eyes:JACQUELIN, EOMI  Ear/Nose/Throat: no oral lesion, no sinus tenderness on percussion	  Neck:no JVD, no lymphadenopathy, supple  Respiratory: CTA maryjane  Cardiovascular: S1S2 RRR, no murmurs  Gastrointestinal:soft, (+) BS, no HSM  Extremities:no e/e/c        LABS:                        9.2    16.6  )-----------( 582      ( 02 May 2019 18:00 )             26.7     05-02    132<L>  |  91<L>  |  33<H>  ----------------------------<  139<H>  3.7   |  31  |  <0.30<L>    Ca    8.6      02 May 2019 06:50              MICROBIOLOGY:    RECENT CULTURES:  04-28 @ 11:51 .Blood       Growth in aerobic bottle: Gram Positive Cocci in Clusters           No growth to date.    04-26 @ 14:11 .Blood                No growth at 5 days.          RESPIRATORY CULTURES:              RADIOLOGY & ADDITIONAL STUDIES:        Pager [phone]  After 5 pm/weekends or if no response :[phone]

## 2019-05-03 NOTE — PROGRESS NOTE ADULT - SUBJECTIVE AND OBJECTIVE BOX
INTERVAL HPI/OVERNIGHT EVENTS:    MEDICATIONS  (STANDING):  ALBUTerol    90 MICROgram(s) HFA Inhaler 1 Puff(s) Inhalation every 4 hours  apixaban 5 milliGRAM(s) Oral every 12 hours  artificial tears (preservative free) Ophthalmic Solution 1 Drop(s) Both EYES three times a day  ascorbic acid 500 milliGRAM(s) Oral daily  buDESOnide   0.5 milliGRAM(s) Respule 0.5 milliGRAM(s) Inhalation every 12 hours  chlorhexidine 4% Liquid 1 Application(s) Topical <User Schedule>  clonazePAM Tablet 1.5 milliGRAM(s) Oral <User Schedule>  dextrose 5%. 1000 milliLiter(s) (50 mL/Hr) IV Continuous <Continuous>  dextrose 50% Injectable 12.5 Gram(s) IV Push once  dextrose 50% Injectable 25 Gram(s) IV Push once  dextrose 50% Injectable 25 Gram(s) IV Push once  diphenoxylate/atropine 2 Tablet(s) Oral two times a day  famotidine    Tablet 20 milliGRAM(s) Oral daily  ferrous    sulfate Liquid 300 milliGRAM(s) Enteral Tube daily  gabapentin   Solution 300 milliGRAM(s) Oral two times a day  insulin lispro (HumaLOG) corrective regimen sliding scale   SubCutaneous three times a day before meals  insulin lispro (HumaLOG) corrective regimen sliding scale   SubCutaneous at bedtime  Medical Marijuana Oil 1 milliLiter(s),Medical Marijuana Oil 1 milliLiter(s) 1 milliLiter(s) Oral <User Schedule>  multivitamin/minerals 1 Tablet(s) Oral daily  petrolatum white Ointment 1 Application(s) Topical three times a day  predniSONE   Tablet   Oral   predniSONE   Tablet 7.5 milliGRAM(s) Oral daily  QUEtiapine 25 milliGRAM(s) Oral at bedtime  tiotropium 18 MICROgram(s) Capsule 1 Capsule(s) Inhalation daily  tiZANidine 4 milliGRAM(s) Oral <User Schedule>  vancomycin  IVPB 1000 milliGRAM(s) IV Intermittent every 24 hours    MEDICATIONS  (PRN):  acetaminophen    Suspension .. 650 milliGRAM(s) Oral every 6 hours PRN Temp greater or equal to 38C (100.4F), Mild Pain (1 - 3), Moderate Pain (4 - 6)  ALBUTerol/ipratropium for Nebulization 3 milliLiter(s) Nebulizer every 6 hours PRN SOB/wheezing  dextrose 40% Gel 15 Gram(s) Oral once PRN Blood Glucose LESS THAN 70 milliGRAM(s)/deciLiter  diphenhydrAMINE   Elixir 25 milliGRAM(s) Oral every 6 hours PRN Rash and/or Itching  glucagon  Injectable 1 milliGRAM(s) IntraMuscular once PRN Glucose <70 milliGRAM(s)/deciLiter  nystatin Powder 1 Application(s) Topical two times a day PRN rash/itchiness  sodium chloride 0.65% Nasal 1 Spray(s) Both Nostrils three times a day PRN Nasal Congestion      Allergies    ASA; dye contrast (Anaphylaxis)  aspirin (Short breath)  divalproex sodium (Other (Unknown))  Flowers and Plants (Short breath)  Haldol (Other (Unknown))  penicillin (Short breath; Rash)  sulfa drugs (Short breath; Rash)  vancomycin (Rash; Urticaria; Hives)  Xanax (Other (Unknown))    Intolerances            PHYSICAL EXAM:   Vital Signs:  Vital Signs Last 24 Hrs  T(C): 36.6 (03 May 2019 06:50), Max: 36.6 (02 May 2019 20:48)  T(F): 97.9 (03 May 2019 06:50), Max: 97.9 (02 May 2019 20:48)  HR: 98 (03 May 2019 06:50) (98 - 105)  BP: 124/75 (03 May 2019 06:50) (120/70 - 143/80)  BP(mean): --  RR: 18 (03 May 2019 06:50) (18 - 19)  SpO2: 94% (03 May 2019 06:50) (94% - 96%)  Daily     Daily     GENERAL:  no distress  HEENT:  NC/AT,  anicteric  CHEST:   no increased effort, breath sounds clear  HEART:  Regular rhythm  ABDOMEN:  Soft, non-tender, non-distended, normoactive bowel sounds,  no masses ,no hepato-splenomegaly, no signs of chronic liver disease  EXTEREMITIES:  no cyanosis, contracted      LABS:                        10.9   9.0   )-----------( 527      ( 03 May 2019 07:20 )             35.2     05-03    124<L>  |  88<L>  |  31<H>  ----------------------------<  113<H>  3.7   |  28  |  <0.30<L>    Ca    8.8      03 May 2019 07:20            RADIOLOGY & ADDITIONAL TESTS:

## 2019-05-03 NOTE — PROVIDER CONTACT NOTE (OTHER) - RECOMMENDATIONS
?Called PICC nurse and said to order cath flow.
Assess patient. Review patient's lab results & orders.
Assess patient. Review patient's orders and lab results.
Assess patient. Review patient's orders and lab results.
Assess patient. Review patient's orders, lab results, history, plan, heart rate & rhythm on the monitor. Order potassium supplement and repeat labs.
Give pt. tylenol to control fever. Continue to monitor pt.
IV tylenol
IV tylenol
K supplement; continue to monitor on tele
Notify NP
Pt has PRN tylenol. Consider ordering blood culture.
hold feed at this time
patient Spouse requesting to continue with 1can Glucerna 4x/day. nutrition consult reviewed; as per nutrition consult Glucerna 1.5 tere 1 can 4x/day.
please come assess pt
to assess pt
Assess patient. Educate patient's family.

## 2019-05-03 NOTE — PROGRESS NOTE ADULT - SUBJECTIVE AND OBJECTIVE BOX
---___---___---___---___---___---___ ---___---___---___---___---___---___---___---___---___---                    <<<  M E D I C A L   A T T E N D I N G    F O L L O W    U P   N O T E  >>>    - Patient seen  today. no acute distress     ---___---___---___---___---___---      <<<  MEDICATIONS:  >>>    MEDICATIONS  (STANDING):  ALBUTerol    90 MICROgram(s) HFA Inhaler 1 Puff(s) Inhalation every 4 hours  apixaban 5 milliGRAM(s) Oral every 12 hours  artificial tears (preservative free) Ophthalmic Solution 1 Drop(s) Both EYES three times a day  ascorbic acid 500 milliGRAM(s) Oral daily  aztreonam  IVPB 2000 milliGRAM(s) IV Intermittent every 8 hours  buDESOnide   0.5 milliGRAM(s) Respule 0.5 milliGRAM(s) Inhalation every 12 hours  chlorhexidine 4% Liquid 1 Application(s) Topical <User Schedule>  clonazePAM Tablet 1.5 milliGRAM(s) Oral <User Schedule>  dextrose 5%. 1000 milliLiter(s) (50 mL/Hr) IV Continuous <Continuous>  dextrose 50% Injectable 12.5 Gram(s) IV Push once  dextrose 50% Injectable 25 Gram(s) IV Push once  dextrose 50% Injectable 25 Gram(s) IV Push once  diphenoxylate/atropine 2 Tablet(s) Oral two times a day  famotidine    Tablet 20 milliGRAM(s) Oral daily  ferrous    sulfate Liquid 300 milliGRAM(s) Enteral Tube daily  gabapentin   Solution 300 milliGRAM(s) Oral two times a day  insulin lispro (HumaLOG) corrective regimen sliding scale   SubCutaneous three times a day before meals  insulin lispro (HumaLOG) corrective regimen sliding scale   SubCutaneous at bedtime  Medical Marijuana Oil 1 milliLiter(s),Medical Marijuana Oil 1 milliLiter(s) 1 milliLiter(s) Oral <User Schedule>  multivitamin/minerals 1 Tablet(s) Oral daily  petrolatum white Ointment 1 Application(s) Topical three times a day  predniSONE   Tablet   Oral   predniSONE   Tablet 7.5 milliGRAM(s) Oral daily  QUEtiapine 25 milliGRAM(s) Oral at bedtime  tiotropium 18 MICROgram(s) Capsule 1 Capsule(s) Inhalation daily  tiZANidine 4 milliGRAM(s) Oral <User Schedule>  vancomycin  IVPB 1000 milliGRAM(s) IV Intermittent every 24 hours      MEDICATIONS  (PRN):  acetaminophen    Suspension .. 650 milliGRAM(s) Oral every 6 hours PRN Temp greater or equal to 38C (100.4F), Mild Pain (1 - 3), Moderate Pain (4 - 6)  ALBUTerol/ipratropium for Nebulization 3 milliLiter(s) Nebulizer every 6 hours PRN SOB/wheezing  dextrose 40% Gel 15 Gram(s) Oral once PRN Blood Glucose LESS THAN 70 milliGRAM(s)/deciLiter  diphenhydrAMINE   Elixir 25 milliGRAM(s) Oral every 6 hours PRN Rash and/or Itching  glucagon  Injectable 1 milliGRAM(s) IntraMuscular once PRN Glucose <70 milliGRAM(s)/deciLiter  nystatin Powder 1 Application(s) Topical two times a day PRN rash/itchiness  sodium chloride 0.65% Nasal 1 Spray(s) Both Nostrils three times a day PRN Nasal Congestion       ---___---___---___---___---___---     <<<REVIEW OF SYSTEM: >>>    unable to obtain      ---___---___---___---___---___---          <<<  VITAL SIGNS: >>>    T(F): 97.9 (05-02-19 @ 20:48), Max: 97.9 (05-02-19 @ 20:48)  HR: 105 (05-02-19 @ 20:48) (103 - 105)  BP: 143/80 (05-02-19 @ 20:48) (120/70 - 143/80)  RR: 18 (05-02-19 @ 20:48) (18 - 19)  SpO2: 95% (05-02-19 @ 20:48) (95% - 96%)  Wt(kg): --  CAPILLARY BLOOD GLUCOSE      POCT Blood Glucose.: 118 mg/dL (02 May 2019 21:35)    I&O's Summary    01 May 2019 07:01  -  02 May 2019 07:00  --------------------------------------------------------  IN: 1660 mL / OUT: 700 mL / NET: 960 mL    02 May 2019 07:01  -  03 May 2019 06:46  --------------------------------------------------------  IN: 1290 mL / OUT: 600 mL / NET: 690 mL         ---___---___---___---___---___---                       PHYSICAL EXAM:    GEN: A&O X 0 , NAD , comfortable  HEENT: NCAT, PERRL, MMM, no scleral icterus, hearing intact  NECK: Supple, No JVD  CVS: S1S2 , regular , No M/R/G appreciated  PULM: CTA B/L,  no W/R/R appreciated  ABD.: soft. non tender, non distended,  bowel sounds present peg noted   Extrem: intact pulses , no edema noted  Derm: No rash or ecchymosis noted sacral decubitus noted   PSYCH: normal mood, no depression, not anxious     ---___---___---___---___---___---     <<<  LAB AND IMAGING: >>>                          9.2    16.6  )-----------( 582      ( 02 May 2019 18:00 )             26.7               05-02    132<L>  |  91<L>  |  33<H>  ----------------------------<  139<H>  3.7   |  31  |  <0.30<L>    Ca    8.6      02 May 2019 06:50                                 [All pertinent / recent available Imaging reports and other labs reviewed]     ---___---___---___---___---___---___ ---___---___---___---___---           <<<  A S S E S S M E N T   A N D   P L A N :  >>>  klebsiella pneumonia finish course of abx  monitor clinically   chronic infected right hip spacer on vancomycin iv via picc  left leg fracture pain management and supportive care   asthma on budesonide  dm2 secondary to medication continue insulin lispro  anemia of chronic disease restart iron   -GI/DVT Prophylaxis. on Eliquis  chronic pain on medical marijuana  dementia and agitation on Seroquel and  Klonopin     should consider DC planning . finished course of abx, clinically improved afebrile     --------------------------------------------  Case discussed with   Education given on     >>______________________<<      Deniz Bolden .         phone   [phone]

## 2019-05-03 NOTE — PROGRESS NOTE ADULT - ASSESSMENT
69 yo with advanced dementia , bed bound, contracted, cva, sp removal of infected hip due to mrsa with placement of space last fall  Pt has been having fevers for months  Suspected ongoing Om of the hip site but changing the spacer is not a realistic option--On Vanco coverage  Chronic Om of the sacrum usually does not cause fevers and does not need prolonged ab therapy  PICC line changed 4/18  Has leukocytosis, intermittent fevers     UA equivocal (UCX pending)  BCX now with K pne S pending  CT with multifocal pna  Source? Does not appears urine source; CT with new pna (although pna rare cause GN bacteremia);    Overall, new K pne bacteremia, MRSA joint infection, leukocytosis, fever, PCN allergy  - Ceftriaxone 1g q 24 (Monitor on first dose; note patient has tolerated cefepime, and ceftolozane in the past)  - Vanco 1g q 24, monitor trough      in view of response and follow up negative bc, will continue current regimen   hold on changing picc at present   discussed with  in detail   to complete course of aztreonam  ( changed from ceftriaxone due to rash)  for gram negative bacteremia on friday but rash is unimpressive    low grade fever present  reculture if she becomes septic not for fever alone  discussed with  in detail

## 2019-05-04 LAB
ANION GAP SERPL CALC-SCNC: 13 MMOL/L — SIGNIFICANT CHANGE UP (ref 5–17)
BUN SERPL-MCNC: 28 MG/DL — HIGH (ref 7–23)
CALCIUM SERPL-MCNC: 8.9 MG/DL — SIGNIFICANT CHANGE UP (ref 8.4–10.5)
CHLORIDE SERPL-SCNC: 88 MMOL/L — LOW (ref 96–108)
CO2 SERPL-SCNC: 29 MMOL/L — SIGNIFICANT CHANGE UP (ref 22–31)
CREAT SERPL-MCNC: <0.3 MG/DL — LOW (ref 0.5–1.3)
GLUCOSE BLDC GLUCOMTR-MCNC: 123 MG/DL — HIGH (ref 70–99)
GLUCOSE BLDC GLUCOMTR-MCNC: 144 MG/DL — HIGH (ref 70–99)
GLUCOSE BLDC GLUCOMTR-MCNC: 153 MG/DL — HIGH (ref 70–99)
GLUCOSE BLDC GLUCOMTR-MCNC: 216 MG/DL — HIGH (ref 70–99)
GLUCOSE SERPL-MCNC: 89 MG/DL — SIGNIFICANT CHANGE UP (ref 70–99)
HCT VFR BLD CALC: 24.9 % — LOW (ref 34.5–45)
HGB BLD-MCNC: 8.4 G/DL — LOW (ref 11.5–15.5)
MCHC RBC-ENTMCNC: 31.2 PG — SIGNIFICANT CHANGE UP (ref 27–34)
MCHC RBC-ENTMCNC: 33.8 GM/DL — SIGNIFICANT CHANGE UP (ref 32–36)
MCV RBC AUTO: 92.3 FL — SIGNIFICANT CHANGE UP (ref 80–100)
PLATELET # BLD AUTO: 528 K/UL — HIGH (ref 150–400)
POTASSIUM SERPL-MCNC: 3.7 MMOL/L — SIGNIFICANT CHANGE UP (ref 3.5–5.3)
POTASSIUM SERPL-SCNC: 3.7 MMOL/L — SIGNIFICANT CHANGE UP (ref 3.5–5.3)
RBC # BLD: 2.69 M/UL — LOW (ref 3.8–5.2)
RBC # FLD: 15.3 % — HIGH (ref 10.3–14.5)
SODIUM SERPL-SCNC: 130 MMOL/L — LOW (ref 135–145)
WBC # BLD: 9.7 K/UL — SIGNIFICANT CHANGE UP (ref 3.8–10.5)
WBC # FLD AUTO: 9.7 K/UL — SIGNIFICANT CHANGE UP (ref 3.8–10.5)

## 2019-05-04 RX ORDER — CLONAZEPAM 1 MG
1.5 TABLET ORAL
Qty: 0 | Refills: 0 | Status: DISCONTINUED | OUTPATIENT
Start: 2019-05-04 | End: 2019-05-06

## 2019-05-04 RX ADMIN — Medication 500 MILLIGRAM(S): at 09:06

## 2019-05-04 RX ADMIN — Medication 7.5 MILLIGRAM(S): at 06:26

## 2019-05-04 RX ADMIN — Medication 25 MILLIGRAM(S): at 09:05

## 2019-05-04 RX ADMIN — Medication 300 MILLIGRAM(S): at 09:05

## 2019-05-04 RX ADMIN — GABAPENTIN 300 MILLIGRAM(S): 400 CAPSULE ORAL at 09:05

## 2019-05-04 RX ADMIN — Medication 2 TABLET(S): at 06:26

## 2019-05-04 RX ADMIN — Medication 2 TABLET(S): at 17:22

## 2019-05-04 RX ADMIN — TIZANIDINE 4 MILLIGRAM(S): 4 TABLET ORAL at 09:06

## 2019-05-04 RX ADMIN — QUETIAPINE FUMARATE 25 MILLIGRAM(S): 200 TABLET, FILM COATED ORAL at 22:06

## 2019-05-04 RX ADMIN — Medication 250 MILLIGRAM(S): at 09:06

## 2019-05-04 RX ADMIN — Medication 1.5 MILLIGRAM(S): at 22:05

## 2019-05-04 RX ADMIN — Medication 0.5 MILLIGRAM(S): at 17:22

## 2019-05-04 RX ADMIN — Medication 1 DROP(S): at 22:06

## 2019-05-04 RX ADMIN — FAMOTIDINE 20 MILLIGRAM(S): 10 INJECTION INTRAVENOUS at 09:06

## 2019-05-04 RX ADMIN — APIXABAN 5 MILLIGRAM(S): 2.5 TABLET, FILM COATED ORAL at 06:26

## 2019-05-04 RX ADMIN — Medication 1 DROP(S): at 13:00

## 2019-05-04 RX ADMIN — Medication 1 DROP(S): at 06:26

## 2019-05-04 RX ADMIN — Medication 1 TABLET(S): at 09:06

## 2019-05-04 RX ADMIN — CHLORHEXIDINE GLUCONATE 1 APPLICATION(S): 213 SOLUTION TOPICAL at 06:26

## 2019-05-04 RX ADMIN — TIZANIDINE 4 MILLIGRAM(S): 4 TABLET ORAL at 22:06

## 2019-05-04 RX ADMIN — Medication 650 MILLIGRAM(S): at 21:35

## 2019-05-04 RX ADMIN — Medication 0.5 MILLIGRAM(S): at 06:27

## 2019-05-04 RX ADMIN — Medication 1 APPLICATION(S): at 13:00

## 2019-05-04 RX ADMIN — GABAPENTIN 300 MILLIGRAM(S): 400 CAPSULE ORAL at 21:35

## 2019-05-04 RX ADMIN — APIXABAN 5 MILLIGRAM(S): 2.5 TABLET, FILM COATED ORAL at 17:22

## 2019-05-04 RX ADMIN — Medication 1 APPLICATION(S): at 22:23

## 2019-05-04 RX ADMIN — Medication 1 APPLICATION(S): at 06:23

## 2019-05-04 RX ADMIN — Medication 2: at 13:03

## 2019-05-04 NOTE — PROGRESS NOTE ADULT - ASSESSMENT
reports 1 loose stool daily on peg feeds, off abx other than IV Vanco and on lomotil  would follow for now

## 2019-05-04 NOTE — H&P ADULT - PMH
Acid reflux    Anxiety    Asthma    CVA (cerebral vascular accident)    Depression    Fatty pancreas    HLD (hyperlipidemia)    IGT (impaired glucose tolerance)    Mouth sores    PNA (pneumonia)    Pulmonary HTN    Ulcerative colitis
gradual onset

## 2019-05-04 NOTE — PROGRESS NOTE ADULT - SUBJECTIVE AND OBJECTIVE BOX
---___---___---___---___---___---___ ---___---___---___---___---___---___---___---___---___---                    <<<  M E D I C A L   A T T E N D I N G    F O L L O W    U P   N O T E  >>>    unchanged  otherwise doing well      ---___---___---___---___---___---      <<<  MEDICATIONS:  >>>    MEDICATIONS  (STANDING):  ALBUTerol    90 MICROgram(s) HFA Inhaler 1 Puff(s) Inhalation every 4 hours  apixaban 5 milliGRAM(s) Oral every 12 hours  artificial tears (preservative free) Ophthalmic Solution 1 Drop(s) Both EYES three times a day  ascorbic acid 500 milliGRAM(s) Oral daily  buDESOnide   0.5 milliGRAM(s) Respule 0.5 milliGRAM(s) Inhalation every 12 hours  chlorhexidine 4% Liquid 1 Application(s) Topical <User Schedule>  clonazePAM Tablet 1.5 milliGRAM(s) Oral <User Schedule>  dextrose 5%. 1000 milliLiter(s) (50 mL/Hr) IV Continuous <Continuous>  dextrose 50% Injectable 12.5 Gram(s) IV Push once  dextrose 50% Injectable 25 Gram(s) IV Push once  dextrose 50% Injectable 25 Gram(s) IV Push once  diphenoxylate/atropine 2 Tablet(s) Oral two times a day  famotidine    Tablet 20 milliGRAM(s) Oral daily  ferrous    sulfate Liquid 300 milliGRAM(s) Enteral Tube daily  gabapentin   Solution 300 milliGRAM(s) Oral two times a day  insulin lispro (HumaLOG) corrective regimen sliding scale   SubCutaneous three times a day before meals  insulin lispro (HumaLOG) corrective regimen sliding scale   SubCutaneous at bedtime  Medical Marijuana Oil 1 milliLiter(s),Medical Marijuana Oil 1 milliLiter(s) 1 milliLiter(s) Oral <User Schedule>  multivitamin/minerals 1 Tablet(s) Oral daily  petrolatum white Ointment 1 Application(s) Topical three times a day  predniSONE   Tablet   Oral   predniSONE   Tablet 7.5 milliGRAM(s) Oral daily  QUEtiapine 25 milliGRAM(s) Oral at bedtime  tiotropium 18 MICROgram(s) Capsule 1 Capsule(s) Inhalation daily  tiZANidine 4 milliGRAM(s) Oral <User Schedule>  vancomycin  IVPB 1000 milliGRAM(s) IV Intermittent every 24 hours      MEDICATIONS  (PRN):  acetaminophen    Suspension .. 650 milliGRAM(s) Oral every 6 hours PRN Temp greater or equal to 38C (100.4F), Mild Pain (1 - 3), Moderate Pain (4 - 6)  ALBUTerol/ipratropium for Nebulization 3 milliLiter(s) Nebulizer every 6 hours PRN SOB/wheezing  dextrose 40% Gel 15 Gram(s) Oral once PRN Blood Glucose LESS THAN 70 milliGRAM(s)/deciLiter  diphenhydrAMINE   Elixir 25 milliGRAM(s) Oral every 6 hours PRN Rash and/or Itching  glucagon  Injectable 1 milliGRAM(s) IntraMuscular once PRN Glucose <70 milliGRAM(s)/deciLiter  nystatin Powder 1 Application(s) Topical two times a day PRN rash/itchiness  sodium chloride 0.65% Nasal 1 Spray(s) Both Nostrils three times a day PRN Nasal Congestion       ---___---___---___---___---___---     <<<REVIEW OF SYSTEM: >>>    unable to obtain      ---___---___---___---___---___---          <<<  VITAL SIGNS: >>>    T(F): 97.8 (05-04-19 @ 13:02), Max: 100.6 (05-03-19 @ 19:52)  HR: 102 (05-04-19 @ 13:02) (96 - 107)  BP: 120/69 (05-04-19 @ 13:02) (120/69 - 153/73)  RR: 18 (05-04-19 @ 13:02) (17 - 19)  SpO2: 97% (05-04-19 @ 13:02) (93% - 100%)  Wt(kg): --  CAPILLARY BLOOD GLUCOSE      POCT Blood Glucose.: 216 mg/dL (04 May 2019 12:54)    I&O's Summary    03 May 2019 07:01  -  04 May 2019 07:00  --------------------------------------------------------  IN: 1930 mL / OUT: 1750 mL / NET: 180 mL    04 May 2019 07:01  -  04 May 2019 17:14  --------------------------------------------------------  IN: 0 mL / OUT: 350 mL / NET: -350 mL         ---___---___---___---___---___---                       PHYSICAL EXAM:    GEN: A&O X 0 , NAD , comfortable  HEENT: NCAT, PERRL, MMM, no scleral icterus, hearing intact  NECK: Supple, No JVD  CVS: S1S2 , regular , No M/R/G appreciated  PULM: CTA B/L,  no W/R/R appreciated  ABD.: soft. non tender, non distended,  bowel sounds present peg noted  gavin noted   Extrem: intact pulses , no edema noted left leg in brace   Derm: No rash or ecchymosis noted  PSYCH: normal mood, no depression, not anxious     ---___---___---___---___---___---     <<<  LAB AND IMAGING: >>>                          8.4    9.7   )-----------( 528      ( 04 May 2019 06:42 )             24.9               05-04    130<L>  |  88<L>  |  28<H>  ----------------------------<  89  3.7   |  29  |  <0.30<L>    Ca    8.9      04 May 2019 06:42                                 [All pertinent / recent available Imaging reports and other labs reviewed]     ---___---___---___---___---___---___ ---___---___---___---___---           <<<  A S S E S S M E N T   A N D   P L A N :  >>>  om right hip spacer on vanco mycin   klebsiella pneumonia finished course of abx  hyponatremia  improved  anxiety  continue meds  dementia  continue meds supportive care   -GI/DVT Prophylaxis. on Eliquis   asthma  on budesonide  dm2 from medication  on insulin lispro   chf systolic ef 20 % continue lisinopril   chronic pain on medical marijuana and     --------------------------------------------  Case discussed with   Education given on     >>______________________<<      Deniz Bolden .         phone   [phone]

## 2019-05-04 NOTE — PROGRESS NOTE ADULT - SUBJECTIVE AND OBJECTIVE BOX
INTERVAL HPI/OVERNIGHT EVENTS:    MEDICATIONS  (STANDING):  ALBUTerol    90 MICROgram(s) HFA Inhaler 1 Puff(s) Inhalation every 4 hours  apixaban 5 milliGRAM(s) Oral every 12 hours  artificial tears (preservative free) Ophthalmic Solution 1 Drop(s) Both EYES three times a day  ascorbic acid 500 milliGRAM(s) Oral daily  buDESOnide   0.5 milliGRAM(s) Respule 0.5 milliGRAM(s) Inhalation every 12 hours  chlorhexidine 4% Liquid 1 Application(s) Topical <User Schedule>  clonazePAM Tablet 1.5 milliGRAM(s) Oral <User Schedule>  dextrose 5%. 1000 milliLiter(s) (50 mL/Hr) IV Continuous <Continuous>  dextrose 50% Injectable 12.5 Gram(s) IV Push once  dextrose 50% Injectable 25 Gram(s) IV Push once  dextrose 50% Injectable 25 Gram(s) IV Push once  diphenoxylate/atropine 2 Tablet(s) Oral two times a day  famotidine    Tablet 20 milliGRAM(s) Oral daily  ferrous    sulfate Liquid 300 milliGRAM(s) Enteral Tube daily  gabapentin   Solution 300 milliGRAM(s) Oral two times a day  insulin lispro (HumaLOG) corrective regimen sliding scale   SubCutaneous three times a day before meals  insulin lispro (HumaLOG) corrective regimen sliding scale   SubCutaneous at bedtime  Medical Marijuana Oil 1 milliLiter(s),Medical Marijuana Oil 1 milliLiter(s) 1 milliLiter(s) Oral <User Schedule>  multivitamin/minerals 1 Tablet(s) Oral daily  petrolatum white Ointment 1 Application(s) Topical three times a day  predniSONE   Tablet   Oral   predniSONE   Tablet 7.5 milliGRAM(s) Oral daily  QUEtiapine 25 milliGRAM(s) Oral at bedtime  tiotropium 18 MICROgram(s) Capsule 1 Capsule(s) Inhalation daily  tiZANidine 4 milliGRAM(s) Oral <User Schedule>  vancomycin  IVPB 1000 milliGRAM(s) IV Intermittent every 24 hours    MEDICATIONS  (PRN):  acetaminophen    Suspension .. 650 milliGRAM(s) Oral every 6 hours PRN Temp greater or equal to 38C (100.4F), Mild Pain (1 - 3), Moderate Pain (4 - 6)  ALBUTerol/ipratropium for Nebulization 3 milliLiter(s) Nebulizer every 6 hours PRN SOB/wheezing  dextrose 40% Gel 15 Gram(s) Oral once PRN Blood Glucose LESS THAN 70 milliGRAM(s)/deciLiter  diphenhydrAMINE   Elixir 25 milliGRAM(s) Oral every 6 hours PRN Rash and/or Itching  glucagon  Injectable 1 milliGRAM(s) IntraMuscular once PRN Glucose <70 milliGRAM(s)/deciLiter  nystatin Powder 1 Application(s) Topical two times a day PRN rash/itchiness  sodium chloride 0.65% Nasal 1 Spray(s) Both Nostrils three times a day PRN Nasal Congestion      Allergies    ASA; dye contrast (Anaphylaxis)  aspirin (Short breath)  divalproex sodium (Other (Unknown))  Flowers and Plants (Short breath)  Haldol (Other (Unknown))  penicillin (Short breath; Rash)  sulfa drugs (Short breath; Rash)  vancomycin (Rash; Urticaria; Hives)  Xanax (Other (Unknown))    Intolerances            PHYSICAL EXAM:   Vital Signs:  Vital Signs Last 24 Hrs  T(C): 36.5 (04 May 2019 06:01), Max: 38.1 (03 May 2019 19:52)  T(F): 97.7 (04 May 2019 06:01), Max: 100.6 (03 May 2019 19:52)  HR: 96 (04 May 2019 06:01) (96 - 107)  BP: 153/73 (04 May 2019 06:01) (123/65 - 153/73)  BP(mean): --  RR: 18 (04 May 2019 06:01) (17 - 19)  SpO2: 94% (04 May 2019 06:01) (93% - 100%)  Daily     Daily     GENERAL:  no distress  HEENT:  NC/AT,  anicteric  CHEST:   no increased effort, breath sounds clear  HEART:  Regular rhythm  ABDOMEN:  Soft, non-tender, non-distended, normoactive bowel sounds,  no masses ,no hepato-splenomegaly, no signs of chronic liver disease  EXTEREMITIES:  no cyanosis      LABS:                        8.4    9.7   )-----------( 528      ( 04 May 2019 06:42 )             24.9     05-04    130<L>  |  88<L>  |  28<H>  ----------------------------<  89  3.7   |  29  |  <0.30<L>    Ca    8.9      04 May 2019 06:42            RADIOLOGY & ADDITIONAL TESTS:

## 2019-05-05 LAB
GLUCOSE BLDC GLUCOMTR-MCNC: 113 MG/DL — HIGH (ref 70–99)
GLUCOSE BLDC GLUCOMTR-MCNC: 141 MG/DL — HIGH (ref 70–99)
GLUCOSE BLDC GLUCOMTR-MCNC: 170 MG/DL — HIGH (ref 70–99)
GLUCOSE BLDC GLUCOMTR-MCNC: 172 MG/DL — HIGH (ref 70–99)
GLUCOSE BLDC GLUCOMTR-MCNC: 206 MG/DL — HIGH (ref 70–99)

## 2019-05-05 RX ORDER — DIPHENOXYLATE HCL/ATROPINE 2.5-.025MG
2 TABLET ORAL THREE TIMES A DAY
Qty: 0 | Refills: 0 | Status: DISCONTINUED | OUTPATIENT
Start: 2019-05-05 | End: 2019-05-06

## 2019-05-05 RX ADMIN — Medication 300 MILLIGRAM(S): at 13:36

## 2019-05-05 RX ADMIN — Medication 0.5 MILLIGRAM(S): at 18:29

## 2019-05-05 RX ADMIN — CHLORHEXIDINE GLUCONATE 1 APPLICATION(S): 213 SOLUTION TOPICAL at 10:20

## 2019-05-05 RX ADMIN — Medication 1 DROP(S): at 06:30

## 2019-05-05 RX ADMIN — APIXABAN 5 MILLIGRAM(S): 2.5 TABLET, FILM COATED ORAL at 18:29

## 2019-05-05 RX ADMIN — APIXABAN 5 MILLIGRAM(S): 2.5 TABLET, FILM COATED ORAL at 06:29

## 2019-05-05 RX ADMIN — GABAPENTIN 300 MILLIGRAM(S): 400 CAPSULE ORAL at 21:10

## 2019-05-05 RX ADMIN — Medication 2 TABLET(S): at 13:40

## 2019-05-05 RX ADMIN — Medication 1.5 MILLIGRAM(S): at 21:54

## 2019-05-05 RX ADMIN — Medication 2: at 13:36

## 2019-05-05 RX ADMIN — TIZANIDINE 4 MILLIGRAM(S): 4 TABLET ORAL at 09:59

## 2019-05-05 RX ADMIN — Medication 25 MILLIGRAM(S): at 09:58

## 2019-05-05 RX ADMIN — Medication 1 DROP(S): at 13:37

## 2019-05-05 RX ADMIN — Medication 1 APPLICATION(S): at 21:55

## 2019-05-05 RX ADMIN — TIZANIDINE 4 MILLIGRAM(S): 4 TABLET ORAL at 21:54

## 2019-05-05 RX ADMIN — Medication 250 MILLIGRAM(S): at 10:52

## 2019-05-05 RX ADMIN — FAMOTIDINE 20 MILLIGRAM(S): 10 INJECTION INTRAVENOUS at 13:37

## 2019-05-05 RX ADMIN — Medication 0.5 MILLIGRAM(S): at 06:30

## 2019-05-05 RX ADMIN — Medication 7.5 MILLIGRAM(S): at 06:29

## 2019-05-05 RX ADMIN — QUETIAPINE FUMARATE 25 MILLIGRAM(S): 200 TABLET, FILM COATED ORAL at 21:54

## 2019-05-05 RX ADMIN — Medication 500 MILLIGRAM(S): at 13:36

## 2019-05-05 RX ADMIN — GABAPENTIN 300 MILLIGRAM(S): 400 CAPSULE ORAL at 10:49

## 2019-05-05 RX ADMIN — Medication 1 DROP(S): at 21:54

## 2019-05-05 RX ADMIN — Medication 1 APPLICATION(S): at 13:12

## 2019-05-05 RX ADMIN — Medication 1 TABLET(S): at 13:38

## 2019-05-05 RX ADMIN — Medication 1: at 10:13

## 2019-05-05 RX ADMIN — Medication 1 APPLICATION(S): at 07:37

## 2019-05-05 RX ADMIN — Medication 2 TABLET(S): at 21:54

## 2019-05-05 RX ADMIN — Medication 2 TABLET(S): at 06:29

## 2019-05-05 NOTE — PROGRESS NOTE ADULT - ASSESSMENT
nurses report 1 liquid stool daily on PEG feeds  will increase lomotil to 2 TID, may need DTO if not effective

## 2019-05-05 NOTE — PROGRESS NOTE ADULT - SUBJECTIVE AND OBJECTIVE BOX
INTERVAL HPI/OVERNIGHT EVENTS:    MEDICATIONS  (STANDING):  ALBUTerol    90 MICROgram(s) HFA Inhaler 1 Puff(s) Inhalation every 4 hours  apixaban 5 milliGRAM(s) Oral every 12 hours  artificial tears (preservative free) Ophthalmic Solution 1 Drop(s) Both EYES three times a day  ascorbic acid 500 milliGRAM(s) Oral daily  buDESOnide   0.5 milliGRAM(s) Respule 0.5 milliGRAM(s) Inhalation every 12 hours  chlorhexidine 4% Liquid 1 Application(s) Topical <User Schedule>  clonazePAM Tablet 1.5 milliGRAM(s) Oral <User Schedule>  dextrose 5%. 1000 milliLiter(s) (50 mL/Hr) IV Continuous <Continuous>  dextrose 50% Injectable 12.5 Gram(s) IV Push once  dextrose 50% Injectable 25 Gram(s) IV Push once  dextrose 50% Injectable 25 Gram(s) IV Push once  diphenoxylate/atropine 2 Tablet(s) Oral three times a day  famotidine    Tablet 20 milliGRAM(s) Oral daily  ferrous    sulfate Liquid 300 milliGRAM(s) Enteral Tube daily  gabapentin   Solution 300 milliGRAM(s) Oral two times a day  insulin lispro (HumaLOG) corrective regimen sliding scale   SubCutaneous three times a day before meals  insulin lispro (HumaLOG) corrective regimen sliding scale   SubCutaneous at bedtime  Medical Marijuana Oil 1 milliLiter(s),Medical Marijuana Oil 1 milliLiter(s) 1 milliLiter(s) Oral <User Schedule>  multivitamin/minerals 1 Tablet(s) Oral daily  petrolatum white Ointment 1 Application(s) Topical three times a day  predniSONE   Tablet   Oral   predniSONE   Tablet 7.5 milliGRAM(s) Oral daily  QUEtiapine 25 milliGRAM(s) Oral at bedtime  tiotropium 18 MICROgram(s) Capsule 1 Capsule(s) Inhalation daily  tiZANidine 4 milliGRAM(s) Oral <User Schedule>  vancomycin  IVPB 1000 milliGRAM(s) IV Intermittent every 24 hours    MEDICATIONS  (PRN):  acetaminophen    Suspension .. 650 milliGRAM(s) Oral every 6 hours PRN Temp greater or equal to 38C (100.4F), Mild Pain (1 - 3), Moderate Pain (4 - 6)  ALBUTerol/ipratropium for Nebulization 3 milliLiter(s) Nebulizer every 6 hours PRN SOB/wheezing  dextrose 40% Gel 15 Gram(s) Oral once PRN Blood Glucose LESS THAN 70 milliGRAM(s)/deciLiter  diphenhydrAMINE   Elixir 25 milliGRAM(s) Oral every 6 hours PRN Rash and/or Itching  glucagon  Injectable 1 milliGRAM(s) IntraMuscular once PRN Glucose <70 milliGRAM(s)/deciLiter  nystatin Powder 1 Application(s) Topical two times a day PRN rash/itchiness  sodium chloride 0.65% Nasal 1 Spray(s) Both Nostrils three times a day PRN Nasal Congestion      Allergies    ASA; dye contrast (Anaphylaxis)  aspirin (Short breath)  divalproex sodium (Other (Unknown))  Flowers and Plants (Short breath)  Haldol (Other (Unknown))  penicillin (Short breath; Rash)  sulfa drugs (Short breath; Rash)  vancomycin (Rash; Urticaria; Hives)  Xanax (Other (Unknown))    Intolerances            PHYSICAL EXAM:   Vital Signs:  Vital Signs Last 24 Hrs  T(C): 36.6 (05 May 2019 06:22), Max: 37.1 (04 May 2019 20:42)  T(F): 97.9 (05 May 2019 06:22), Max: 98.7 (04 May 2019 20:42)  HR: 102 (05 May 2019 06:22) (102 - 103)  BP: 136/72 (05 May 2019 06:22) (120/69 - 136/72)  BP(mean): --  RR: 17 (05 May 2019 06:22) (17 - 19)  SpO2: 95% (05 May 2019 06:22) (95% - 100%)  Daily     Daily     GENERAL:  no distress  HEENT:  NC/AT,  anicteric  CHEST:   no increased effort, breath sounds clear  HEART:  Regular rhythm  ABDOMEN:  Soft, non-tender, non-distended, normoactive bowel sounds,  peg site stable  EXTEREMITIES:  no cyanosis      LABS:                        8.4    9.7   )-----------( 528      ( 04 May 2019 06:42 )             24.9     05-04    130<L>  |  88<L>  |  28<H>  ----------------------------<  89  3.7   |  29  |  <0.30<L>    Ca    8.9      04 May 2019 06:42            RADIOLOGY & ADDITIONAL TESTS:

## 2019-05-05 NOTE — PROGRESS NOTE ADULT - SUBJECTIVE AND OBJECTIVE BOX
---___---___---___---___---___---___ ---___---___---___---___---___---___---___---___---                  M E D I C A L   A T T E N D I N G   PROGRESS   N O T E  ---___---___---___---___---___---___ ---___---___---___---___---___---___---___---___---        ================================================    ++CHIEF COMPLAINT:   Patient is a 68y old  Female who presents with a chief complaint of fevers (04 May 2019 17:14)         had fever yesterday on multiple abx       ---___---___---___---___---___---  PAST MEDICAL / Surgical  HISTORY:  PAST MEDICAL & SURGICAL HISTORY:  CVA (cerebral vascular accident)  Fatty pancreas  PNA (pneumonia)  Pulmonary HTN  IGT (impaired glucose tolerance)  Ulcerative colitis  Acid reflux  Anxiety  Depression  Mouth sores  HLD (hyperlipidemia)  Asthma  Humeral head fracture  H/O: hysterectomy  H/O cataract extraction, left  History of knee replacement      ---___---___---___---___---___---  FAMILY HISTORY:   FAMILY HISTORY:  Family history of dementia (Grandparent)  Family history of colon cancer (Grandparent)        ---___---___---___---___---___---  ALLERGIES:   Allergies    ASA; dye contrast (Anaphylaxis)  aspirin (Short breath)  divalproex sodium (Other (Unknown))  Flowers and Plants (Short breath)  Haldol (Other (Unknown))  penicillin (Short breath; Rash)  sulfa drugs (Short breath; Rash)  vancomycin (Rash; Urticaria; Hives)  Xanax (Other (Unknown))    Intolerances        ---___---___---___---___---___---  MEDICATIONS:  MEDICATIONS  (STANDING):  ALBUTerol    90 MICROgram(s) HFA Inhaler 1 Puff(s) Inhalation every 4 hours  apixaban 5 milliGRAM(s) Oral every 12 hours  artificial tears (preservative free) Ophthalmic Solution 1 Drop(s) Both EYES three times a day  ascorbic acid 500 milliGRAM(s) Oral daily  buDESOnide   0.5 milliGRAM(s) Respule 0.5 milliGRAM(s) Inhalation every 12 hours  chlorhexidine 4% Liquid 1 Application(s) Topical <User Schedule>  clonazePAM Tablet 1.5 milliGRAM(s) Oral <User Schedule>  dextrose 5%. 1000 milliLiter(s) (50 mL/Hr) IV Continuous <Continuous>  dextrose 50% Injectable 12.5 Gram(s) IV Push once  dextrose 50% Injectable 25 Gram(s) IV Push once  dextrose 50% Injectable 25 Gram(s) IV Push once  diphenoxylate/atropine 2 Tablet(s) Oral two times a day  famotidine    Tablet 20 milliGRAM(s) Oral daily  ferrous    sulfate Liquid 300 milliGRAM(s) Enteral Tube daily  gabapentin   Solution 300 milliGRAM(s) Oral two times a day  insulin lispro (HumaLOG) corrective regimen sliding scale   SubCutaneous three times a day before meals  insulin lispro (HumaLOG) corrective regimen sliding scale   SubCutaneous at bedtime  Medical Marijuana Oil 1 milliLiter(s),Medical Marijuana Oil 1 milliLiter(s) 1 milliLiter(s) Oral <User Schedule>  multivitamin/minerals 1 Tablet(s) Oral daily  petrolatum white Ointment 1 Application(s) Topical three times a day  predniSONE   Tablet   Oral   predniSONE   Tablet 7.5 milliGRAM(s) Oral daily  QUEtiapine 25 milliGRAM(s) Oral at bedtime  tiotropium 18 MICROgram(s) Capsule 1 Capsule(s) Inhalation daily  tiZANidine 4 milliGRAM(s) Oral <User Schedule>  vancomycin  IVPB 1000 milliGRAM(s) IV Intermittent every 24 hours    MEDICATIONS  (PRN):  acetaminophen    Suspension .. 650 milliGRAM(s) Oral every 6 hours PRN Temp greater or equal to 38C (100.4F), Mild Pain (1 - 3), Moderate Pain (4 - 6)  ALBUTerol/ipratropium for Nebulization 3 milliLiter(s) Nebulizer every 6 hours PRN SOB/wheezing  dextrose 40% Gel 15 Gram(s) Oral once PRN Blood Glucose LESS THAN 70 milliGRAM(s)/deciLiter  diphenhydrAMINE   Elixir 25 milliGRAM(s) Oral every 6 hours PRN Rash and/or Itching  glucagon  Injectable 1 milliGRAM(s) IntraMuscular once PRN Glucose <70 milliGRAM(s)/deciLiter  nystatin Powder 1 Application(s) Topical two times a day PRN rash/itchiness  sodium chloride 0.65% Nasal 1 Spray(s) Both Nostrils three times a day PRN Nasal Congestion      ---___---___---___---___---___---  REVIEW OF SYSTEM:  unable to obtain   ---___---___---___---___---___---  VITAL SIGNS:  68y , CAPILLARY BLOOD GLUCOSE      POCT Blood Glucose.: 153 mg/dL (04 May 2019 21:34)      ---___---___---___---___---___---  PHYSICAL EXAM:    GEN: A&O X 0 , NAD , comfortable  HEENT: NCAT, PERRL, MMM, hearing intact  Neck: supple , no JVD  CVS: S1S2 , regular , No M/R/G appreciated  PULM: CTA B/L,  no W/R/R appreciated  ABD.: soft. non tender, non distended,  bowel sounds present peg noted   Extrem: intact pulses , no edema   Derm: No rash , no ecchymoses sacral; decubitus noted   PSYCH : normal mood,  no delusion not anxious     ---___---___---___---___---___---            LAB AND IMAGIN.4    9.7   )-----------( 528      ( 04 May 2019 06:42 )             24.9               05-04    130<L>  |  88<L>  |  28<H>  ----------------------------<  89  3.7   |  29  |  <0.30<L>    Ca    8.9      04 May 2019 06:42                                  [All pertinent / recent Imaging reviewed]         ---___---___---___---___---___---___ ---___---___---___---___---                         A S S E S S M E N T   A N D   P L A N :      HEALTH ISSUES - PROBLEM Dx:  Ulcerative colitis: Ulcerative colitis contiue care per gi   Hyperglycemia: Hyperglycemia on insulin now will go home with metformin   HTN (hypertension): HTN (hypertension) continue meds  Iron deficiency anemia: Iron deficiency anemia continue iron via peg   Asthma: Asthma  History of DVT (deep vein thrombosis): History of DVT (deep vein thrombosis) on eliquis  Dementia: Dementia supportive care   Functional quadriplegia: Functional quadriplegia Supprotive care   Pressure injury of sacral region, stage 4: Pressure injury of sacral region, stage 4 continue wound care   Chronic systolic heart failure: Chronic systolic heart failure contine lisinopril   SIRS (systemic inflammatory response syndrome): SIRS (systemic inflammatory response syndrome)  OPM of the right legh on vancomycin  ID following-GI/DVT Prophylaxis.    --------------------------------------------  Case discussed with   Education given on   ___________________________  Thank you,  Deniz Bolden  [phone]

## 2019-05-06 VITALS
HEART RATE: 103 BPM | RESPIRATION RATE: 16 BRPM | OXYGEN SATURATION: 92 % | SYSTOLIC BLOOD PRESSURE: 138 MMHG | DIASTOLIC BLOOD PRESSURE: 97 MMHG | TEMPERATURE: 98 F

## 2019-05-06 LAB
ANION GAP SERPL CALC-SCNC: 10 MMOL/L — SIGNIFICANT CHANGE UP (ref 5–17)
BUN SERPL-MCNC: 21 MG/DL — SIGNIFICANT CHANGE UP (ref 7–23)
CALCIUM SERPL-MCNC: 8.6 MG/DL — SIGNIFICANT CHANGE UP (ref 8.4–10.5)
CHLORIDE SERPL-SCNC: 91 MMOL/L — LOW (ref 96–108)
CO2 SERPL-SCNC: 30 MMOL/L — SIGNIFICANT CHANGE UP (ref 22–31)
CREAT SERPL-MCNC: <0.3 MG/DL — LOW (ref 0.5–1.3)
GLUCOSE BLDC GLUCOMTR-MCNC: 173 MG/DL — HIGH (ref 70–99)
GLUCOSE BLDC GLUCOMTR-MCNC: 188 MG/DL — HIGH (ref 70–99)
GLUCOSE SERPL-MCNC: 118 MG/DL — HIGH (ref 70–99)
HCT VFR BLD CALC: 24.3 % — LOW (ref 34.5–45)
HGB BLD-MCNC: 8.4 G/DL — LOW (ref 11.5–15.5)
MCHC RBC-ENTMCNC: 32.2 PG — SIGNIFICANT CHANGE UP (ref 27–34)
MCHC RBC-ENTMCNC: 34.7 GM/DL — SIGNIFICANT CHANGE UP (ref 32–36)
MCV RBC AUTO: 92.7 FL — SIGNIFICANT CHANGE UP (ref 80–100)
PLATELET # BLD AUTO: 580 K/UL — HIGH (ref 150–400)
POTASSIUM SERPL-MCNC: 3.9 MMOL/L — SIGNIFICANT CHANGE UP (ref 3.5–5.3)
POTASSIUM SERPL-SCNC: 3.9 MMOL/L — SIGNIFICANT CHANGE UP (ref 3.5–5.3)
RBC # BLD: 2.62 M/UL — LOW (ref 3.8–5.2)
RBC # FLD: 15.8 % — HIGH (ref 10.3–14.5)
SODIUM SERPL-SCNC: 131 MMOL/L — LOW (ref 135–145)
WBC # BLD: 11.3 K/UL — HIGH (ref 3.8–10.5)
WBC # FLD AUTO: 11.3 K/UL — HIGH (ref 3.8–10.5)

## 2019-05-06 PROCEDURE — 85014 HEMATOCRIT: CPT

## 2019-05-06 PROCEDURE — 86803 HEPATITIS C AB TEST: CPT

## 2019-05-06 PROCEDURE — 87150 DNA/RNA AMPLIFIED PROBE: CPT

## 2019-05-06 PROCEDURE — 87040 BLOOD CULTURE FOR BACTERIA: CPT

## 2019-05-06 PROCEDURE — 73562 X-RAY EXAM OF KNEE 3: CPT

## 2019-05-06 PROCEDURE — 82330 ASSAY OF CALCIUM: CPT

## 2019-05-06 PROCEDURE — 86923 COMPATIBILITY TEST ELECTRIC: CPT

## 2019-05-06 PROCEDURE — 97606 NEG PRS WND THER DME>50 SQCM: CPT

## 2019-05-06 PROCEDURE — 87507 IADNA-DNA/RNA PROBE TQ 12-25: CPT

## 2019-05-06 PROCEDURE — 84550 ASSAY OF BLOOD/URIC ACID: CPT

## 2019-05-06 PROCEDURE — 81001 URINALYSIS AUTO W/SCOPE: CPT

## 2019-05-06 PROCEDURE — 84295 ASSAY OF SERUM SODIUM: CPT

## 2019-05-06 PROCEDURE — 93971 EXTREMITY STUDY: CPT

## 2019-05-06 PROCEDURE — 96374 THER/PROPH/DIAG INJ IV PUSH: CPT

## 2019-05-06 PROCEDURE — 87486 CHLMYD PNEUM DNA AMP PROBE: CPT

## 2019-05-06 PROCEDURE — 82947 ASSAY GLUCOSE BLOOD QUANT: CPT

## 2019-05-06 PROCEDURE — 82728 ASSAY OF FERRITIN: CPT

## 2019-05-06 PROCEDURE — 87798 DETECT AGENT NOS DNA AMP: CPT

## 2019-05-06 PROCEDURE — 71250 CT THORAX DX C-: CPT

## 2019-05-06 PROCEDURE — 84145 PROCALCITONIN (PCT): CPT

## 2019-05-06 PROCEDURE — 80053 COMPREHEN METABOLIC PANEL: CPT

## 2019-05-06 PROCEDURE — 82565 ASSAY OF CREATININE: CPT

## 2019-05-06 PROCEDURE — 71045 X-RAY EXAM CHEST 1 VIEW: CPT

## 2019-05-06 PROCEDURE — 36430 TRANSFUSION BLD/BLD COMPNT: CPT

## 2019-05-06 PROCEDURE — 99285 EMERGENCY DEPT VISIT HI MDM: CPT | Mod: 25

## 2019-05-06 PROCEDURE — 73551 X-RAY EXAM OF FEMUR 1: CPT

## 2019-05-06 PROCEDURE — 82306 VITAMIN D 25 HYDROXY: CPT

## 2019-05-06 PROCEDURE — 80202 ASSAY OF VANCOMYCIN: CPT

## 2019-05-06 PROCEDURE — 85027 COMPLETE CBC AUTOMATED: CPT

## 2019-05-06 PROCEDURE — 97605 NEG PRS WND THER DME<=50SQCM: CPT

## 2019-05-06 PROCEDURE — 85730 THROMBOPLASTIN TIME PARTIAL: CPT

## 2019-05-06 PROCEDURE — 82803 BLOOD GASES ANY COMBINATION: CPT

## 2019-05-06 PROCEDURE — 85652 RBC SED RATE AUTOMATED: CPT

## 2019-05-06 PROCEDURE — 87086 URINE CULTURE/COLONY COUNT: CPT

## 2019-05-06 PROCEDURE — 82435 ASSAY OF BLOOD CHLORIDE: CPT

## 2019-05-06 PROCEDURE — 80048 BASIC METABOLIC PNL TOTAL CA: CPT

## 2019-05-06 PROCEDURE — 83605 ASSAY OF LACTIC ACID: CPT

## 2019-05-06 PROCEDURE — 36569 INSJ PICC 5 YR+ W/O IMAGING: CPT

## 2019-05-06 PROCEDURE — 36600 WITHDRAWAL OF ARTERIAL BLOOD: CPT

## 2019-05-06 PROCEDURE — 87581 M.PNEUMON DNA AMP PROBE: CPT

## 2019-05-06 PROCEDURE — 97163 PT EVAL HIGH COMPLEX 45 MIN: CPT

## 2019-05-06 PROCEDURE — 87324 CLOSTRIDIUM AG IA: CPT

## 2019-05-06 PROCEDURE — 74176 CT ABD & PELVIS W/O CONTRAST: CPT

## 2019-05-06 PROCEDURE — P9016: CPT

## 2019-05-06 PROCEDURE — 87449 NOS EACH ORGANISM AG IA: CPT

## 2019-05-06 PROCEDURE — 86901 BLOOD TYPING SEROLOGIC RH(D): CPT

## 2019-05-06 PROCEDURE — 83550 IRON BINDING TEST: CPT

## 2019-05-06 PROCEDURE — 84100 ASSAY OF PHOSPHORUS: CPT

## 2019-05-06 PROCEDURE — 82962 GLUCOSE BLOOD TEST: CPT

## 2019-05-06 PROCEDURE — 99232 SBSQ HOSP IP/OBS MODERATE 35: CPT

## 2019-05-06 PROCEDURE — 94640 AIRWAY INHALATION TREATMENT: CPT

## 2019-05-06 PROCEDURE — 86900 BLOOD TYPING SEROLOGIC ABO: CPT

## 2019-05-06 PROCEDURE — 85610 PROTHROMBIN TIME: CPT

## 2019-05-06 PROCEDURE — 87633 RESP VIRUS 12-25 TARGETS: CPT

## 2019-05-06 PROCEDURE — 93005 ELECTROCARDIOGRAM TRACING: CPT

## 2019-05-06 PROCEDURE — 83735 ASSAY OF MAGNESIUM: CPT

## 2019-05-06 PROCEDURE — 83540 ASSAY OF IRON: CPT

## 2019-05-06 PROCEDURE — 86850 RBC ANTIBODY SCREEN: CPT

## 2019-05-06 PROCEDURE — 86140 C-REACTIVE PROTEIN: CPT

## 2019-05-06 PROCEDURE — 97602 WOUND(S) CARE NON-SELECTIVE: CPT

## 2019-05-06 PROCEDURE — 84132 ASSAY OF SERUM POTASSIUM: CPT

## 2019-05-06 PROCEDURE — 87186 SC STD MICRODIL/AGAR DIL: CPT

## 2019-05-06 PROCEDURE — C1751: CPT

## 2019-05-06 RX ORDER — VANCOMYCIN HCL 1 G
1 VIAL (EA) INTRAVENOUS
Qty: 7 | Refills: 0 | OUTPATIENT
Start: 2019-05-06 | End: 2019-05-12

## 2019-05-06 RX ORDER — DIPHENOXYLATE HCL/ATROPINE 2.5-.025MG
10 TABLET ORAL
Qty: 100 | Refills: 0
Start: 2019-05-06 | End: 2019-05-19

## 2019-05-06 RX ORDER — VANCOMYCIN HCL 1 G
1 VIAL (EA) INTRAVENOUS
Qty: 7 | Refills: 0
Start: 2019-05-06 | End: 2019-05-12

## 2019-05-06 RX ORDER — NYSTATIN CREAM 100000 [USP'U]/G
1 CREAM TOPICAL
Qty: 1 | Refills: 0
Start: 2019-05-06 | End: 2019-06-04

## 2019-05-06 RX ADMIN — Medication 500 MILLIGRAM(S): at 09:01

## 2019-05-06 RX ADMIN — Medication 1: at 13:36

## 2019-05-06 RX ADMIN — Medication 7.5 MILLIGRAM(S): at 06:43

## 2019-05-06 RX ADMIN — Medication 2 TABLET(S): at 06:43

## 2019-05-06 RX ADMIN — Medication 1 DROP(S): at 06:44

## 2019-05-06 RX ADMIN — FAMOTIDINE 20 MILLIGRAM(S): 10 INJECTION INTRAVENOUS at 09:01

## 2019-05-06 RX ADMIN — GABAPENTIN 300 MILLIGRAM(S): 400 CAPSULE ORAL at 09:00

## 2019-05-06 RX ADMIN — Medication 300 MILLIGRAM(S): at 09:01

## 2019-05-06 RX ADMIN — Medication 0.5 MILLIGRAM(S): at 06:43

## 2019-05-06 RX ADMIN — Medication 1 TABLET(S): at 09:01

## 2019-05-06 RX ADMIN — Medication 250 MILLIGRAM(S): at 10:11

## 2019-05-06 RX ADMIN — CHLORHEXIDINE GLUCONATE 1 APPLICATION(S): 213 SOLUTION TOPICAL at 14:34

## 2019-05-06 RX ADMIN — TIZANIDINE 4 MILLIGRAM(S): 4 TABLET ORAL at 09:00

## 2019-05-06 RX ADMIN — Medication 1 APPLICATION(S): at 06:45

## 2019-05-06 RX ADMIN — APIXABAN 5 MILLIGRAM(S): 2.5 TABLET, FILM COATED ORAL at 06:43

## 2019-05-06 RX ADMIN — Medication 25 MILLIGRAM(S): at 09:01

## 2019-05-06 RX ADMIN — Medication 1: at 09:00

## 2019-05-06 RX ADMIN — Medication 1 DROP(S): at 13:35

## 2019-05-06 RX ADMIN — Medication 1 APPLICATION(S): at 13:35

## 2019-05-06 NOTE — CHART NOTE - NSCHARTNOTEFT_GEN_A_CORE
Late Entry Note  Received a call  form DR Bolden that Pt is medically cleared for d/c . Pt seen and examined .       Vital Signs Last 24 Hrs  T(C): 36.6 (06 May 2019 14:00), Max: 36.9 (05 May 2019 20:12)  T(F): 97.9 (06 May 2019 14:00), Max: 98.4 (05 May 2019 20:12)  HR: 103 (06 May 2019 14:00) (97 - 109)  BP: 138/97 (06 May 2019 14:00) (130/70 - 140/72)  BP(mean): --  RR: 16 (06 May 2019 14:00) (16 - 17)  SpO2: 92% (06 May 2019 14:00) (92% - 100%)      Labs:                          8.4    11.3  )-----------( 580      ( 06 May 2019 07:32 )             24.3     05-06    131<L>  |  91<L>  |  21  ----------------------------<  118<H>  3.9   |  30  |  <0.30<L>    Ca    8.6      06 May 2019 07:32              Radiology:    Physical Exam:  General: WN/WD NAD  Neurology: alert   Head:  Normocephalic, atraumatic  Respiratory: CTA B/L  CV: RRR, S1S2, no murmur  Abdominal: Soft, NT, ND no palpable mass  MSK: No edema, + peripheral pulses,  LLE in brace     Discharge Note and Plan:  >Follow up with DR Bolden   >c/w IV vanco 1 gram daily for1 week   > f/u pt/ot  and speech and swallow  at Home   > cbc , Cr  and Vanco T weekly '  >discharge meds reviewed and reconciled with DR Bolden , medications reviewed with  who verbalized understanding   > Wound Vac  to be evaluated by DR Bernal in 2-3 weeks as OP   > d/c Pt home with home care and DYLAN Pierre NP-C [id number]

## 2019-05-06 NOTE — PROGRESS NOTE ADULT - SUBJECTIVE AND OBJECTIVE BOX
infectious diseases progress note:    Patient is a 68y old  Female who presents with a chief complaint of fevers (06 May 2019 07:29)        Fever             Allergies    ASA; dye contrast (Anaphylaxis)  aspirin (Short breath)  divalproex sodium (Other (Unknown))  Flowers and Plants (Short breath)  Haldol (Other (Unknown))  penicillin (Short breath; Rash)  sulfa drugs (Short breath; Rash)  vancomycin (Rash; Urticaria; Hives)  Xanax (Other (Unknown))    Intolerances        ANTIBIOTICS/RELEVANT:  antimicrobials  vancomycin  IVPB 1000 milliGRAM(s) IV Intermittent every 24 hours    immunologic:    OTHER:  acetaminophen    Suspension .. 650 milliGRAM(s) Oral every 6 hours PRN  ALBUTerol    90 MICROgram(s) HFA Inhaler 1 Puff(s) Inhalation every 4 hours  ALBUTerol/ipratropium for Nebulization 3 milliLiter(s) Nebulizer every 6 hours PRN  apixaban 5 milliGRAM(s) Oral every 12 hours  artificial tears (preservative free) Ophthalmic Solution 1 Drop(s) Both EYES three times a day  ascorbic acid 500 milliGRAM(s) Oral daily  buDESOnide   0.5 milliGRAM(s) Respule 0.5 milliGRAM(s) Inhalation every 12 hours  chlorhexidine 4% Liquid 1 Application(s) Topical <User Schedule>  clonazePAM Tablet 1.5 milliGRAM(s) Oral <User Schedule>  dextrose 40% Gel 15 Gram(s) Oral once PRN  dextrose 5%. 1000 milliLiter(s) IV Continuous <Continuous>  dextrose 50% Injectable 12.5 Gram(s) IV Push once  dextrose 50% Injectable 25 Gram(s) IV Push once  dextrose 50% Injectable 25 Gram(s) IV Push once  diphenhydrAMINE   Elixir 25 milliGRAM(s) Oral every 6 hours PRN  diphenoxylate/atropine 2 Tablet(s) Oral three times a day  famotidine    Tablet 20 milliGRAM(s) Oral daily  ferrous    sulfate Liquid 300 milliGRAM(s) Enteral Tube daily  gabapentin   Solution 300 milliGRAM(s) Oral two times a day  glucagon  Injectable 1 milliGRAM(s) IntraMuscular once PRN  insulin lispro (HumaLOG) corrective regimen sliding scale   SubCutaneous three times a day before meals  insulin lispro (HumaLOG) corrective regimen sliding scale   SubCutaneous at bedtime  Medical Marijuana Oil 1 milliLiter(s),Medical Marijuana Oil 1 milliLiter(s) 1 milliLiter(s) Oral <User Schedule>  multivitamin/minerals 1 Tablet(s) Oral daily  nystatin Powder 1 Application(s) Topical two times a day PRN  petrolatum white Ointment 1 Application(s) Topical three times a day  predniSONE   Tablet   Oral   predniSONE   Tablet 7.5 milliGRAM(s) Oral daily  QUEtiapine 25 milliGRAM(s) Oral at bedtime  sodium chloride 0.65% Nasal 1 Spray(s) Both Nostrils three times a day PRN  tiotropium 18 MICROgram(s) Capsule 1 Capsule(s) Inhalation daily  tiZANidine 4 milliGRAM(s) Oral <User Schedule>      Objective:  Vital Signs Last 24 Hrs  T(C): 36.8 (06 May 2019 06:39), Max: 36.9 (05 May 2019 20:12)  T(F): 98.2 (06 May 2019 06:39), Max: 98.4 (05 May 2019 20:12)  HR: 97 (06 May 2019 06:39) (97 - 109)  BP: 130/70 (06 May 2019 06:39) (116/73 - 140/72)  BP(mean): --  RR: 17 (06 May 2019 06:39) (16 - 17)  SpO2: 100% (06 May 2019 06:39) (96% - 100%)    PHYSICAL EXAM:     Eyes:JACQUELIN, EOMI  Ear/Nose/Throat: no oral lesion, no sinus tenderness on percussion	  Neck:no JVD, no lymphadenopathy, supple  Respiratory: CTA maryjane  Cardiovascular: S1S2 RRR, no murmurs  Gastrointestinal:soft, (+) BS, no HSM           LABS:                  MICROBIOLOGY:    RECENT CULTURES:        RESPIRATORY CULTURES:              RADIOLOGY & ADDITIONAL STUDIES:        Pager [phone]  After 5 pm/weekends or if no response :[phone]

## 2019-05-06 NOTE — PROGRESS NOTE ADULT - PROVIDER SPECIALTY LIST ADULT
Family Medicine
Gastroenterology
Infectious Disease
Internal Medicine
Intervent Radiology
Wound Care
Family Medicine
Gastroenterology
Infectious Disease
Internal Medicine

## 2019-05-06 NOTE — PROGRESS NOTE ADULT - SUBJECTIVE AND OBJECTIVE BOX
---___---___---___---___---___---___ ---___---___---___---___---___---___---___---___---                  M E D I C A L   A T T E N D I N G   P R O G R E S S   N O T E  ---___---___---___---___---___---___ ---___---___---___---___---___---___---___---___---        ================================================    ++CHIEF COMPLAINT:   Patient is a 68y old  Female who presents with a chief complaint of fevers (05 May 2019 10:35)      Fever    ---___---___---___---___---___---  PAST MEDICAL / Surgical  HISTORY:  PAST MEDICAL & SURGICAL HISTORY:  CVA (cerebral vascular accident)  Fatty pancreas  PNA (pneumonia)  Pulmonary HTN  IGT (impaired glucose tolerance)  Ulcerative colitis  Acid reflux  Anxiety  Depression  Mouth sores  HLD (hyperlipidemia)  Asthma  Humeral head fracture  H/O: hysterectomy  H/O cataract extraction, left  History of knee replacement      ---___---___---___---___---___---  FAMILY HISTORY:   FAMILY HISTORY:  Family history of dementia (Grandparent)  Family history of colon cancer (Grandparent)        ---___---___---___---___---___---  ALLERGIES:   Allergies    ASA; dye contrast (Anaphylaxis)  aspirin (Short breath)  divalproex sodium (Other (Unknown))  Flowers and Plants (Short breath)  Haldol (Other (Unknown))  penicillin (Short breath; Rash)  sulfa drugs (Short breath; Rash)  vancomycin (Rash; Urticaria; Hives)  Xanax (Other (Unknown))    Intolerances        ---___---___---___---___---___---  MEDICATIONS:  MEDICATIONS  (STANDING):  ALBUTerol    90 MICROgram(s) HFA Inhaler 1 Puff(s) Inhalation every 4 hours  apixaban 5 milliGRAM(s) Oral every 12 hours  artificial tears (preservative free) Ophthalmic Solution 1 Drop(s) Both EYES three times a day  ascorbic acid 500 milliGRAM(s) Oral daily  buDESOnide   0.5 milliGRAM(s) Respule 0.5 milliGRAM(s) Inhalation every 12 hours  chlorhexidine 4% Liquid 1 Application(s) Topical <User Schedule>  clonazePAM Tablet 1.5 milliGRAM(s) Oral <User Schedule>  dextrose 5%. 1000 milliLiter(s) (50 mL/Hr) IV Continuous <Continuous>  dextrose 50% Injectable 12.5 Gram(s) IV Push once  dextrose 50% Injectable 25 Gram(s) IV Push once  dextrose 50% Injectable 25 Gram(s) IV Push once  diphenoxylate/atropine 2 Tablet(s) Oral three times a day  famotidine    Tablet 20 milliGRAM(s) Oral daily  ferrous    sulfate Liquid 300 milliGRAM(s) Enteral Tube daily  gabapentin   Solution 300 milliGRAM(s) Oral two times a day  insulin lispro (HumaLOG) corrective regimen sliding scale   SubCutaneous three times a day before meals  insulin lispro (HumaLOG) corrective regimen sliding scale   SubCutaneous at bedtime  Medical Marijuana Oil 1 milliLiter(s),Medical Marijuana Oil 1 milliLiter(s) 1 milliLiter(s) Oral <User Schedule>  multivitamin/minerals 1 Tablet(s) Oral daily  petrolatum white Ointment 1 Application(s) Topical three times a day  predniSONE   Tablet   Oral   predniSONE   Tablet 7.5 milliGRAM(s) Oral daily  QUEtiapine 25 milliGRAM(s) Oral at bedtime  tiotropium 18 MICROgram(s) Capsule 1 Capsule(s) Inhalation daily  tiZANidine 4 milliGRAM(s) Oral <User Schedule>  vancomycin  IVPB 1000 milliGRAM(s) IV Intermittent every 24 hours    MEDICATIONS  (PRN):  acetaminophen    Suspension .. 650 milliGRAM(s) Oral every 6 hours PRN Temp greater or equal to 38C (100.4F), Mild Pain (1 - 3), Moderate Pain (4 - 6)  ALBUTerol/ipratropium for Nebulization 3 milliLiter(s) Nebulizer every 6 hours PRN SOB/wheezing  dextrose 40% Gel 15 Gram(s) Oral once PRN Blood Glucose LESS THAN 70 milliGRAM(s)/deciLiter  diphenhydrAMINE   Elixir 25 milliGRAM(s) Oral every 6 hours PRN Rash and/or Itching  glucagon  Injectable 1 milliGRAM(s) IntraMuscular once PRN Glucose <70 milliGRAM(s)/deciLiter  nystatin Powder 1 Application(s) Topical two times a day PRN rash/itchiness  sodium chloride 0.65% Nasal 1 Spray(s) Both Nostrils three times a day PRN Nasal Congestion      ---___---___---___---___---___---  REVIEW OF SYSTEM:    unable to obtain     ---___---___---___---___---___---  VITAL SIGNS:  68y , CAPILLARY BLOOD GLUCOSE      POCT Blood Glucose.: 141 mg/dL (05 May 2019 21:52)    T(C): 36.8 (05-06-19 @ 06:39), Max: 36.9 (05-05-19 @ 20:12)  HR: 97 (05-06-19 @ 06:39) (97 - 109)  BP: 130/70 (05-06-19 @ 06:39) (116/73 - 140/72)  RR: 17 (05-06-19 @ 06:39) (16 - 17)  SpO2: 100% (05-06-19 @ 06:39) (96% - 100%)  ---___---___---___---___---___---  PHYSICAL EXAM:    GEN: A&O X 0 , NAD , comfortable  HEENT: NCAT, PERRL, MMM, hearing intact  Neck: supple , no JVD  CVS: S1S2 , regular , No M/R/G appreciated  PULM: CTA B/L,  no W/R/R appreciated  ABD.: soft. non tender, non distended,  bowel sounds present peg noted   Extrem: intact pulses , no edema   Derm: sacral decubitus         ---___---___---___---___---___---            LAB AND IMAGING:                                                  [All pertinent / recent Imaging reviewed]         ---___---___---___---___---___---___ ---___---___---___---___---                         A S S E S S M E N T   A N D   P L A N :    Likely DC home today     HEALTH ISSUES - PROBLEM Dx:  Ulcerative colitis: Ulcerative colitis GI following  currently stable   Hyperglycemia: Hyperglycemia contine lispro  change  to lquid metformin 500/5 cc  5cc via peg   HTN (hypertension): HTN (hypertension) stable but does have tachycardi resatrt toprol xl 12.5 mg  po daily   Iron deficiency anemia: Iron deficiency anemia contiue iron supplementation   Asthma: Asthma contineu budesonide   History of DVT (deep vein thrombosis): History of DVT (deep vein thrombosis) on eliquis   Dementia: Dementia supportive care  agitation needs seroquel and klonopin   Functional quadriplegia: Functional quadriplegia supportive care now with brace to left leg   Pressure injury of sacral region, stage 4: Pressure injury of sacral region, stage 4 wound care to follow as well as vns   Chronic systolic heart failure: Chronic systolic heart failure continue lisinopril                         --------------------------------------------  Case discussed with kendra roque   Education given on   ___________________________  Thank you,  Deniz Bolden  [phone]

## 2019-05-06 NOTE — PROGRESS NOTE ADULT - SUBJECTIVE AND OBJECTIVE BOX
MYRIAM HEATH:[id number],   68yFemale followed for:  ASA; dye contrast (Anaphylaxis)  aspirin (Short breath)  divalproex sodium (Other (Unknown))  Flowers and Plants (Short breath)  Haldol (Other (Unknown))  penicillin (Short breath; Rash)  sulfa drugs (Short breath; Rash)  vancomycin (Rash; Urticaria; Hives)  Xanax (Other (Unknown))    PAST MEDICAL & SURGICAL HISTORY:  CVA (cerebral vascular accident)  Fatty pancreas  PNA (pneumonia)  Pulmonary HTN  IGT (impaired glucose tolerance)  Ulcerative colitis  Acid reflux  Anxiety  Depression  Mouth sores  HLD (hyperlipidemia)  Asthma  Humeral head fracture  H/O: hysterectomy  H/O cataract extraction, left  History of knee replacement    FAMILY HISTORY:  Family history of dementia (Grandparent)  Family history of colon cancer (Grandparent)    MEDICATIONS  (STANDING):  ALBUTerol    90 MICROgram(s) HFA Inhaler 1 Puff(s) Inhalation every 4 hours  apixaban 5 milliGRAM(s) Oral every 12 hours  artificial tears (preservative free) Ophthalmic Solution 1 Drop(s) Both EYES three times a day  ascorbic acid 500 milliGRAM(s) Oral daily  buDESOnide   0.5 milliGRAM(s) Respule 0.5 milliGRAM(s) Inhalation every 12 hours  chlorhexidine 4% Liquid 1 Application(s) Topical <User Schedule>  clonazePAM Tablet 1.5 milliGRAM(s) Oral <User Schedule>  dextrose 5%. 1000 milliLiter(s) (50 mL/Hr) IV Continuous <Continuous>  dextrose 50% Injectable 12.5 Gram(s) IV Push once  dextrose 50% Injectable 25 Gram(s) IV Push once  dextrose 50% Injectable 25 Gram(s) IV Push once  diphenoxylate/atropine 2 Tablet(s) Oral three times a day  famotidine    Tablet 20 milliGRAM(s) Oral daily  ferrous    sulfate Liquid 300 milliGRAM(s) Enteral Tube daily  gabapentin   Solution 300 milliGRAM(s) Oral two times a day  insulin lispro (HumaLOG) corrective regimen sliding scale   SubCutaneous three times a day before meals  insulin lispro (HumaLOG) corrective regimen sliding scale   SubCutaneous at bedtime  Medical Marijuana Oil 1 milliLiter(s),Medical Marijuana Oil 1 milliLiter(s) 1 milliLiter(s) Oral <User Schedule>  multivitamin/minerals 1 Tablet(s) Oral daily  petrolatum white Ointment 1 Application(s) Topical three times a day  predniSONE   Tablet   Oral   predniSONE   Tablet 7.5 milliGRAM(s) Oral daily  QUEtiapine 25 milliGRAM(s) Oral at bedtime  tiotropium 18 MICROgram(s) Capsule 1 Capsule(s) Inhalation daily  tiZANidine 4 milliGRAM(s) Oral <User Schedule>  vancomycin  IVPB 1000 milliGRAM(s) IV Intermittent every 24 hours    MEDICATIONS  (PRN):  acetaminophen    Suspension .. 650 milliGRAM(s) Oral every 6 hours PRN Temp greater or equal to 38C (100.4F), Mild Pain (1 - 3), Moderate Pain (4 - 6)  ALBUTerol/ipratropium for Nebulization 3 milliLiter(s) Nebulizer every 6 hours PRN SOB/wheezing  dextrose 40% Gel 15 Gram(s) Oral once PRN Blood Glucose LESS THAN 70 milliGRAM(s)/deciLiter  diphenhydrAMINE   Elixir 25 milliGRAM(s) Oral every 6 hours PRN Rash and/or Itching  glucagon  Injectable 1 milliGRAM(s) IntraMuscular once PRN Glucose <70 milliGRAM(s)/deciLiter  nystatin Powder 1 Application(s) Topical two times a day PRN rash/itchiness  sodium chloride 0.65% Nasal 1 Spray(s) Both Nostrils three times a day PRN Nasal Congestion      Vital Signs Last 24 Hrs  T(C): 36.8 (06 May 2019 06:39), Max: 36.9 (05 May 2019 20:12)  T(F): 98.2 (06 May 2019 06:39), Max: 98.4 (05 May 2019 20:12)  HR: 97 (06 May 2019 06:39) (97 - 109)  BP: 130/70 (06 May 2019 06:39) (116/73 - 140/72)  BP(mean): --  RR: 17 (06 May 2019 06:39) (16 - 17)  SpO2: 100% (06 May 2019 06:39) (96% - 100%)  nc/at  s1s2  cta  soft, nt, nd no guarding or rebound  no c/c/e    CBC Full  -  ( 06 May 2019 07:32 )  WBC Count : 11.3 K/uL  RBC Count : 2.62 M/uL  Hemoglobin : 8.4 g/dL  Hematocrit : 24.3 %  Platelet Count - Automated : 580 K/uL  Mean Cell Volume : 92.7 fl  Mean Cell Hemoglobin : 32.2 pg  Mean Cell Hemoglobin Concentration : 34.7 gm/dL  Auto Neutrophil # : x  Auto Lymphocyte # : x  Auto Monocyte # : x  Auto Eosinophil # : x  Auto Basophil # : x  Auto Neutrophil % : x  Auto Lymphocyte % : x  Auto Monocyte % : x  Auto Eosinophil % : x  Auto Basophil % : x    05-06    131<L>  |  91<L>  |  21  ----------------------------<  118<H>  3.9   |  30  |  <0.30<L>    Ca    8.6      06 May 2019 07:32

## 2019-05-06 NOTE — PROGRESS NOTE ADULT - REASON FOR ADMISSION
fevers

## 2019-05-06 NOTE — PROGRESS NOTE ADULT - ASSESSMENT
67 yo with advanced dementia , bed bound, contracted, cva, sp removal of infected hip due to mrsa with placement of space last fall  Pt has been having fevers for months  Suspected ongoing Om of the hip site but changing the spacer is not a realistic option--On Vanco coverage  Chronic Om of the sacrum usually does not cause fevers and does not need prolonged ab therapy  PICC line changed 4/18  Has leukocytosis, intermittent fevers     UA equivocal (UCX pending)  BCX now with K pne S pending  CT with multifocal pna  Source? Does not appears urine source; CT with new pna (although pna rare cause GN bacteremia);    Overall, new K pne bacteremia, MRSA joint infection, leukocytosis, fever, PCN allergy  - Ceftriaxone 1g q 24 (Monitor on first dose; note patient has tolerated cefepime, and ceftolozane in the past)  - Vanco 1g q 24, monitor trough      in view of response and follow up negative bc, will continue current regimen   hold on changing picc at present      to complete course of aztreonam  ( changed from ceftriaxone due to rash)  for gram negative bacteremia on friday but rash is unimpressive    low grade fever present  reculture if she becomes septic not for fever alone   for discharge home

## 2019-05-23 ENCOUNTER — INPATIENT (INPATIENT)
Facility: HOSPITAL | Age: 69
LOS: 7 days | Discharge: HOME | DRG: 871 | End: 2019-05-31
Attending: FAMILY MEDICINE | Admitting: FAMILY MEDICINE
Payer: MEDICARE

## 2019-05-23 DIAGNOSIS — Z96.659 PRESENCE OF UNSPECIFIED ARTIFICIAL KNEE JOINT: Chronic | ICD-10-CM

## 2019-05-23 DIAGNOSIS — Z98.42 CATARACT EXTRACTION STATUS, LEFT EYE: Chronic | ICD-10-CM

## 2019-05-23 DIAGNOSIS — Z90.710 ACQUIRED ABSENCE OF BOTH CERVIX AND UTERUS: Chronic | ICD-10-CM

## 2019-05-23 DIAGNOSIS — S42.293A OTHER DISPLACED FRACTURE OF UPPER END OF UNSPECIFIED HUMERUS, INITIAL ENCOUNTER FOR CLOSED FRACTURE: Chronic | ICD-10-CM

## 2019-05-23 PROCEDURE — 93010 ELECTROCARDIOGRAM REPORT: CPT

## 2019-05-23 PROCEDURE — 99291 CRITICAL CARE FIRST HOUR: CPT | Mod: GC

## 2019-05-24 VITALS
WEIGHT: 89.95 LBS | OXYGEN SATURATION: 95 % | DIASTOLIC BLOOD PRESSURE: 51 MMHG | RESPIRATION RATE: 18 BRPM | HEIGHT: 57 IN | TEMPERATURE: 99 F | HEART RATE: 108 BPM | SYSTOLIC BLOOD PRESSURE: 84 MMHG

## 2019-05-24 DIAGNOSIS — J18.9 PNEUMONIA, UNSPECIFIED ORGANISM: ICD-10-CM

## 2019-05-24 DIAGNOSIS — F32.9 MAJOR DEPRESSIVE DISORDER, SINGLE EPISODE, UNSPECIFIED: ICD-10-CM

## 2019-05-24 DIAGNOSIS — R73.02 IMPAIRED GLUCOSE TOLERANCE (ORAL): ICD-10-CM

## 2019-05-24 DIAGNOSIS — A41.9 SEPSIS, UNSPECIFIED ORGANISM: ICD-10-CM

## 2019-05-24 DIAGNOSIS — F41.9 ANXIETY DISORDER, UNSPECIFIED: ICD-10-CM

## 2019-05-24 DIAGNOSIS — K51.20 ULCERATIVE (CHRONIC) PROCTITIS WITHOUT COMPLICATIONS: ICD-10-CM

## 2019-05-24 DIAGNOSIS — J69.0 PNEUMONITIS DUE TO INHALATION OF FOOD AND VOMIT: ICD-10-CM

## 2019-05-24 LAB
ALBUMIN SERPL ELPH-MCNC: 3 G/DL — LOW (ref 3.3–5)
ALP SERPL-CCNC: 88 U/L — SIGNIFICANT CHANGE UP (ref 40–120)
ALT FLD-CCNC: 13 U/L — SIGNIFICANT CHANGE UP (ref 10–45)
ANION GAP SERPL CALC-SCNC: 15 MMOL/L — SIGNIFICANT CHANGE UP (ref 5–17)
APPEARANCE UR: ABNORMAL
APTT BLD: 41.3 SEC — HIGH (ref 27.5–36.3)
AST SERPL-CCNC: 14 U/L — SIGNIFICANT CHANGE UP (ref 10–40)
BASOPHILS # BLD AUTO: 0.1 K/UL — SIGNIFICANT CHANGE UP (ref 0–0.2)
BILIRUB SERPL-MCNC: 0.4 MG/DL — SIGNIFICANT CHANGE UP (ref 0.2–1.2)
BILIRUB UR-MCNC: NEGATIVE — SIGNIFICANT CHANGE UP
BUN SERPL-MCNC: 42 MG/DL — HIGH (ref 7–23)
CALCIUM SERPL-MCNC: 9.5 MG/DL — SIGNIFICANT CHANGE UP (ref 8.4–10.5)
CHLORIDE SERPL-SCNC: 90 MMOL/L — LOW (ref 96–108)
CO2 SERPL-SCNC: 26 MMOL/L — SIGNIFICANT CHANGE UP (ref 22–31)
COLOR SPEC: YELLOW — SIGNIFICANT CHANGE UP
CREAT SERPL-MCNC: 0.41 MG/DL — LOW (ref 0.5–1.3)
DIFF PNL FLD: ABNORMAL
EOSINOPHIL # BLD AUTO: 0.6 K/UL — HIGH (ref 0–0.5)
EOSINOPHIL NFR BLD AUTO: 1 % — SIGNIFICANT CHANGE UP (ref 0–6)
GAS PNL BLDV: SIGNIFICANT CHANGE UP
GAS PNL BLDV: SIGNIFICANT CHANGE UP
GLUCOSE BLDC GLUCOMTR-MCNC: 173 MG/DL — HIGH (ref 70–99)
GLUCOSE SERPL-MCNC: 128 MG/DL — HIGH (ref 70–99)
GLUCOSE UR QL: NEGATIVE — SIGNIFICANT CHANGE UP
HCT VFR BLD CALC: 29.5 % — LOW (ref 34.5–45)
HGB BLD-MCNC: 10 G/DL — LOW (ref 11.5–15.5)
INR BLD: 1.5 RATIO — HIGH (ref 0.88–1.16)
KETONES UR-MCNC: NEGATIVE — SIGNIFICANT CHANGE UP
LEUKOCYTE ESTERASE UR-ACNC: ABNORMAL
LYMPHOCYTES # BLD AUTO: 1.1 K/UL — SIGNIFICANT CHANGE UP (ref 1–3.3)
LYMPHOCYTES # BLD AUTO: 4 % — LOW (ref 13–44)
MCHC RBC-ENTMCNC: 31.8 PG — SIGNIFICANT CHANGE UP (ref 27–34)
MCHC RBC-ENTMCNC: 33.9 GM/DL — SIGNIFICANT CHANGE UP (ref 32–36)
MCV RBC AUTO: 93.7 FL — SIGNIFICANT CHANGE UP (ref 80–100)
MONOCYTES # BLD AUTO: 1.3 K/UL — HIGH (ref 0–0.9)
MONOCYTES NFR BLD AUTO: 5 % — SIGNIFICANT CHANGE UP (ref 2–14)
NEUTROPHILS # BLD AUTO: 24.6 K/UL — HIGH (ref 1.8–7.4)
NEUTROPHILS NFR BLD AUTO: 89 % — HIGH (ref 43–77)
NITRITE UR-MCNC: NEGATIVE — SIGNIFICANT CHANGE UP
PH UR: 7.5 — SIGNIFICANT CHANGE UP (ref 5–8)
PLATELET # BLD AUTO: 398 K/UL — SIGNIFICANT CHANGE UP (ref 150–400)
POTASSIUM SERPL-MCNC: 4.1 MMOL/L — SIGNIFICANT CHANGE UP (ref 3.5–5.3)
POTASSIUM SERPL-SCNC: 4.1 MMOL/L — SIGNIFICANT CHANGE UP (ref 3.5–5.3)
PROT SERPL-MCNC: 6.2 G/DL — SIGNIFICANT CHANGE UP (ref 6–8.3)
PROT UR-MCNC: ABNORMAL
PROTHROM AB SERPL-ACNC: 17.3 SEC — HIGH (ref 10–12.9)
RBC # BLD: 3.15 M/UL — LOW (ref 3.8–5.2)
RBC # FLD: 15.7 % — HIGH (ref 10.3–14.5)
SODIUM SERPL-SCNC: 131 MMOL/L — LOW (ref 135–145)
SP GR SPEC: 1.02 — SIGNIFICANT CHANGE UP (ref 1.01–1.02)
UROBILINOGEN FLD QL: NEGATIVE — SIGNIFICANT CHANGE UP
WBC # BLD: 27.6 K/UL — HIGH (ref 3.8–10.5)
WBC # FLD AUTO: 27.6 K/UL — HIGH (ref 3.8–10.5)

## 2019-05-24 PROCEDURE — 99222 1ST HOSP IP/OBS MODERATE 55: CPT

## 2019-05-24 PROCEDURE — 71045 X-RAY EXAM CHEST 1 VIEW: CPT | Mod: 26

## 2019-05-24 RX ORDER — QUETIAPINE FUMARATE 200 MG/1
25 TABLET, FILM COATED ORAL AT BEDTIME
Refills: 0 | Status: DISCONTINUED | OUTPATIENT
Start: 2019-05-24 | End: 2019-05-27

## 2019-05-24 RX ORDER — CLONAZEPAM 1 MG
0.5 TABLET ORAL THREE TIMES A DAY
Refills: 0 | Status: DISCONTINUED | OUTPATIENT
Start: 2019-05-24 | End: 2019-05-24

## 2019-05-24 RX ORDER — ALBUTEROL 90 UG/1
2.5 AEROSOL, METERED ORAL EVERY 6 HOURS
Refills: 0 | Status: DISCONTINUED | OUTPATIENT
Start: 2019-05-24 | End: 2019-05-27

## 2019-05-24 RX ORDER — GABAPENTIN 400 MG/1
300 CAPSULE ORAL
Refills: 0 | Status: DISCONTINUED | OUTPATIENT
Start: 2019-05-24 | End: 2019-05-27

## 2019-05-24 RX ORDER — BUDESONIDE, MICRONIZED 100 %
0.5 POWDER (GRAM) MISCELLANEOUS EVERY 12 HOURS
Refills: 0 | Status: DISCONTINUED | OUTPATIENT
Start: 2019-05-24 | End: 2019-05-27

## 2019-05-24 RX ORDER — LINEZOLID 600 MG/300ML
600 INJECTION, SOLUTION INTRAVENOUS ONCE
Refills: 0 | Status: COMPLETED | OUTPATIENT
Start: 2019-05-24 | End: 2019-05-24

## 2019-05-24 RX ORDER — VANCOMYCIN HCL 1 G
750 VIAL (EA) INTRAVENOUS EVERY 12 HOURS
Refills: 0 | Status: DISCONTINUED | OUTPATIENT
Start: 2019-05-24 | End: 2019-05-26

## 2019-05-24 RX ORDER — APIXABAN 2.5 MG/1
2.5 TABLET, FILM COATED ORAL EVERY 12 HOURS
Refills: 0 | Status: DISCONTINUED | OUTPATIENT
Start: 2019-05-24 | End: 2019-05-28

## 2019-05-24 RX ORDER — CEFEPIME 1 G/1
1000 INJECTION, POWDER, FOR SOLUTION INTRAMUSCULAR; INTRAVENOUS EVERY 12 HOURS
Refills: 0 | Status: DISCONTINUED | OUTPATIENT
Start: 2019-05-24 | End: 2019-05-31

## 2019-05-24 RX ORDER — ALBUTEROL 90 UG/1
1 AEROSOL, METERED ORAL EVERY 4 HOURS
Refills: 0 | Status: DISCONTINUED | OUTPATIENT
Start: 2019-05-24 | End: 2019-05-31

## 2019-05-24 RX ORDER — ACETAMINOPHEN 500 MG
650 TABLET ORAL EVERY 6 HOURS
Refills: 0 | Status: DISCONTINUED | OUTPATIENT
Start: 2019-05-24 | End: 2019-05-27

## 2019-05-24 RX ORDER — DIPHENHYDRAMINE HCL 50 MG
25 CAPSULE ORAL EVERY 6 HOURS
Refills: 0 | Status: DISCONTINUED | OUTPATIENT
Start: 2019-05-24 | End: 2019-05-25

## 2019-05-24 RX ORDER — METOPROLOL TARTRATE 50 MG
12.5 TABLET ORAL
Refills: 0 | Status: DISCONTINUED | OUTPATIENT
Start: 2019-05-24 | End: 2019-05-27

## 2019-05-24 RX ORDER — GABAPENTIN 400 MG/1
300 CAPSULE ORAL
Refills: 0 | Status: DISCONTINUED | OUTPATIENT
Start: 2019-05-24 | End: 2019-05-24

## 2019-05-24 RX ORDER — TIZANIDINE 4 MG/1
4 TABLET ORAL
Refills: 0 | Status: DISCONTINUED | OUTPATIENT
Start: 2019-05-24 | End: 2019-05-31

## 2019-05-24 RX ORDER — SODIUM CHLORIDE 9 MG/ML
1500 INJECTION INTRAMUSCULAR; INTRAVENOUS; SUBCUTANEOUS ONCE
Refills: 0 | Status: COMPLETED | OUTPATIENT
Start: 2019-05-24 | End: 2019-05-24

## 2019-05-24 RX ORDER — METOPROLOL TARTRATE 50 MG
25 TABLET ORAL DAILY
Refills: 0 | Status: DISCONTINUED | OUTPATIENT
Start: 2019-05-24 | End: 2019-05-24

## 2019-05-24 RX ORDER — LISINOPRIL 2.5 MG/1
5 TABLET ORAL DAILY
Refills: 0 | Status: DISCONTINUED | OUTPATIENT
Start: 2019-05-24 | End: 2019-05-27

## 2019-05-24 RX ORDER — DIPHENOXYLATE HCL/ATROPINE 2.5-.025MG
1 TABLET ORAL DAILY
Refills: 0 | Status: DISCONTINUED | OUTPATIENT
Start: 2019-05-24 | End: 2019-05-27

## 2019-05-24 RX ORDER — FAMOTIDINE 10 MG/ML
20 INJECTION INTRAVENOUS DAILY
Refills: 0 | Status: DISCONTINUED | OUTPATIENT
Start: 2019-05-24 | End: 2019-05-24

## 2019-05-24 RX ORDER — MEROPENEM 1 G/30ML
1000 INJECTION INTRAVENOUS ONCE
Refills: 0 | Status: COMPLETED | OUTPATIENT
Start: 2019-05-24 | End: 2019-05-24

## 2019-05-24 RX ORDER — CLONAZEPAM 1 MG
1.5 TABLET ORAL AT BEDTIME
Refills: 0 | Status: DISCONTINUED | OUTPATIENT
Start: 2019-05-24 | End: 2019-05-27

## 2019-05-24 RX ORDER — FAMOTIDINE 10 MG/ML
20 INJECTION INTRAVENOUS DAILY
Refills: 0 | Status: DISCONTINUED | OUTPATIENT
Start: 2019-05-24 | End: 2019-05-27

## 2019-05-24 RX ADMIN — Medication 125 MILLIGRAM(S): at 00:58

## 2019-05-24 RX ADMIN — TIZANIDINE 4 MILLIGRAM(S): 4 TABLET ORAL at 17:08

## 2019-05-24 RX ADMIN — Medication 0.5 MILLIGRAM(S): at 17:07

## 2019-05-24 RX ADMIN — Medication 25 MILLIGRAM(S): at 17:07

## 2019-05-24 RX ADMIN — LISINOPRIL 5 MILLIGRAM(S): 2.5 TABLET ORAL at 17:07

## 2019-05-24 RX ADMIN — CEFEPIME 100 MILLIGRAM(S): 1 INJECTION, POWDER, FOR SOLUTION INTRAMUSCULAR; INTRAVENOUS at 17:08

## 2019-05-24 RX ADMIN — FAMOTIDINE 20 MILLIGRAM(S): 10 INJECTION INTRAVENOUS at 12:50

## 2019-05-24 RX ADMIN — SODIUM CHLORIDE 1500 MILLILITER(S): 9 INJECTION INTRAMUSCULAR; INTRAVENOUS; SUBCUTANEOUS at 00:57

## 2019-05-24 RX ADMIN — QUETIAPINE FUMARATE 25 MILLIGRAM(S): 200 TABLET, FILM COATED ORAL at 22:22

## 2019-05-24 RX ADMIN — ALBUTEROL 2.5 MILLIGRAM(S): 90 AEROSOL, METERED ORAL at 12:50

## 2019-05-24 RX ADMIN — Medication 250 MILLIGRAM(S): at 17:07

## 2019-05-24 RX ADMIN — APIXABAN 2.5 MILLIGRAM(S): 2.5 TABLET, FILM COATED ORAL at 17:07

## 2019-05-24 RX ADMIN — MEROPENEM 200 MILLIGRAM(S): 1 INJECTION INTRAVENOUS at 00:58

## 2019-05-24 RX ADMIN — ALBUTEROL 2.5 MILLIGRAM(S): 90 AEROSOL, METERED ORAL at 17:06

## 2019-05-24 RX ADMIN — Medication 1.5 MILLIGRAM(S): at 22:22

## 2019-05-24 RX ADMIN — Medication 1 TABLET(S): at 12:50

## 2019-05-24 RX ADMIN — Medication 12.5 MILLIGRAM(S): at 17:07

## 2019-05-24 RX ADMIN — LINEZOLID 300 MILLIGRAM(S): 600 INJECTION, SOLUTION INTRAVENOUS at 02:07

## 2019-05-24 RX ADMIN — GABAPENTIN 300 MILLIGRAM(S): 400 CAPSULE ORAL at 17:07

## 2019-05-24 NOTE — ED PROVIDER NOTE - CLINICAL SUMMARY MEDICAL DECISION MAKING FREE TEXT BOX
Dr. Alicea Note: hypotensive, fever, risk for aspiration, risk for cystitis, will need treatment for likely sepsis

## 2019-05-24 NOTE — ED ADULT NURSE NOTE - ED STAT RN HANDOFF DETAILS
Verbal report to Yanira HOGAN pt pending [facility] bed assignment

## 2019-05-24 NOTE — H&P ADULT - HISTORY OF PRESENT ILLNESS
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  Patient was recently admitted for fever; started on IV Vancomycin for chronic OM of right hip with LUE PICC placed and receiving IV Vancomycin outpatient; finished course 5 days ago.  Patient began to have a fever yesterday; TMax today of 101.6 rectally outpatient

## 2019-05-24 NOTE — CONSULT NOTE ADULT - SUBJECTIVE AND OBJECTIVE BOX
Patient is a 69y old  Female who presents with a chief complaint of pneumonia (24 May 2019 08:16)    HPI:  · HPI Objective Statement: 68 year-old female with multiple prior inpatient admissions, PMH of advanced dementia (nonverbal, dysphagia with PEG tube, functional quadriplegic, chronic Blackmna catheter), sacral stage 4 pressure ulcer, heel pressure ulcers, asthma on chronic prednisone, HLD, CVA, UC (in remission for 30years), right prosthetic hip MRSA infection in 7/2018 s/p spacer placement, HFrEF, elevated pulmonary pressure brought to the recurrent fevers, increasing lethargy, and labored breathing.  Patient was recently admitted for fever; started on IV Vancomycin for chronic OM of right hip with LUE PICC placed and receiving IV Vancomycin outpatient; finished course 5 days ago.  Patient began to have a fever yesterday; TMax today of 101.6 rectally outpatient (24 May 2019 08:16)      PAST MEDICAL & SURGICAL HISTORY:  CVA (cerebral vascular accident)  Fatty pancreas  PNA (pneumonia)  Pulmonary HTN  IGT (impaired glucose tolerance)  Ulcerative colitis  Acid reflux  Anxiety  Depression  Mouth sores  HLD (hyperlipidemia)  Asthma  Humeral head fracture  H/O: hysterectomy  H/O cataract extraction, left  History of knee replacement      Social history:    FAMILY HISTORY:  Family history of dementia (Grandparent)  Family history of colon cancer (Grandparent)              Allergic/Immunologic:	No hives or rash   Allergies    ASA; dye contrast (Anaphylaxis)  aspirin (Short breath)  divalproex sodium (Other (Unknown))  Flowers and Plants (Short breath)  Haldol (Other (Unknown))  penicillin (Short breath; Rash)  sulfa drugs (Short breath; Rash)  vancomycin (Rash; Urticaria; Hives)  Xanax (Other (Unknown))    Intolerances        Antimicrobials:          Vital Signs Last 24 Hrs  T(C): 36.3 (24 May 2019 08:55), Max: 38.9 (24 May 2019 01:08)  T(F): 97.4 (24 May 2019 08:55), Max: 102 (24 May 2019 01:08)  HR: 89 (24 May 2019 08:55) (89 - 110)  BP: 128/73 (24 May 2019 08:55) (84/51 - 129/65)  BP(mean): --  RR: 18 (24 May 2019 08:55) (16 - 18)  SpO2: 100% (24 May 2019 08:55) (95% - 100%)                cachexia     Eyes:PERRL EOMI.NO discharge or conjunctival injection    ENMT:No sinus tenderness.No thrush.No pharyngeal exudate or erythema.Fair dental hygiene    Neck:Supple,No LN,no JVD      Respiratory:G poor  air entry bilaterally,CTArhonchi     Cardiovascular:S1 S2 wnl, No murmurs,rub or gallops    Gastrointestinal:Soft BS(+) no tenderness no masses ,No rebound or guarding              Extremities: contracted legs     Vascular:peripheral pulses felt                                      10.0   27.6  )-----------( 398      ( 24 May 2019 01:12 )             29.5         05-24    131<L>  |  90<L>  |  42<H>  ----------------------------<  128<H>  4.1   |  26  |  0.41<L>    Ca    9.5      24 May 2019 01:12    TPro  6.2  /  Alb  3.0<L>  /  TBili  0.4  /  DBili  x   /  AST  14  /  ALT  13  /  AlkPhos  88  05-24      RECENT CULTURES:      MICROBIOLOGY:          Radiology:      Assessment:        Recommendations and Plan:    Pager [phone]  After 5 pm/weekends or if no response :[phone]

## 2019-05-24 NOTE — ED PROVIDER NOTE - OBJECTIVE STATEMENT
68 year-old female with multiple prior inpatient admissions, PMH of advanced dementia (nonverbal, dysphagia with PEG tube, functional quadriplegic, chronic Blackman catheter), sacral stage 4 pressure ulcer, heel pressure ulcers, asthma on chronic prednisone, HLD, CVA, UC (in remission for 30years), right prosthetic hip MRSA infection in 7/2018 s/p spacer placement, HFrEF, elevated pulmonary pressure brought to the recurrent fevers, increasing lethargy, and labored breathing.  Patient was recently admitted for fever; started on IV Vancomycin for chronic OM of right hip with LUE PICC placed and receiving IV Vancomycin outpatient; finished course 5 days ago.  Patient began to have a fever yesterday; TMax today of 101.6 rectally outpatient.

## 2019-05-24 NOTE — H&P ADULT - NSICDXPASTMEDICALHX_GEN_ALL_CORE_FT
PAST MEDICAL HISTORY:  Acid reflux     Anxiety     Asthma     CVA (cerebral vascular accident)     Depression     Fatty pancreas     HLD (hyperlipidemia)     IGT (impaired glucose tolerance)     Mouth sores     PNA (pneumonia)     Pulmonary HTN     Ulcerative colitis

## 2019-05-24 NOTE — ED PROVIDER NOTE - PHYSICAL EXAMINATION
*Gen: NAD, lying in bed, functional quadriplegic, frail with contracted extremities, A&Ox0 - does not follow commands  *HEENT: NC/AT, dry mucous membranes, airway patent, trachea midline  *CV: RRR, S1/S2 present, no murmurs/rubs/gallops  *Resp: + respiratory distress, diffuse rales  *Abd: non-distended, soft N/Tx4, no guarding or rigidity, + PEG tube in place  *Neuro: no focal neuro deficits, moving all limbs appropriately  *Extremities: ***  *Skin: large stage 4 sacral ulcer   ~ José Miguel Jimenez M.D.

## 2019-05-24 NOTE — H&P ADULT - NSHPLABSRESULTS_GEN_ALL_CORE
wbc 27.6>10/9.5<398    na 131  k 4.1  cl 90   co2  26  bun 42   cr 0.41   glu 98        cxr showed possible pneumonia

## 2019-05-24 NOTE — H&P ADULT - ASSESSMENT
pneumonia iv abx and id consult isabel need pulmonary   dementia  stage 4 sacral decubitus  continue wound care   chronic systolic heart failure on toprol and lisinopril  h/o fractured left leg  supportive care   chronic pain  on medical marijuana  asthma  continue budesonide   ulcerative colitis  on lomotil

## 2019-05-24 NOTE — ED ADULT NURSE NOTE - OBJECTIVE STATEMENT
69 YOF A&Ox0 with pmh of CVA, dementia, quadriplegic, PEG tube in place brought in by EMS from home for fever. Patient's  at bedside states patient has had a gurgly voice and has had recurrent fevers. Patient's  states patient has had a vancomycin infusion at home of 1 week but has had fevers of 100.0 rectally that has not gone away. Upon assessment patient noted to have wet gurgly cough, patient on 2L through nasal cannula. Patient has large stage 4 sacral ulcer. Dressing replaced in [facility] ED. Patient has small abrasions to legs, left leg is in cast due to injury during previous hospital stay. Patient had gavin catheter placed 14 days ago. Gavin catheter replaced today in [facility] ED. Patient has non-verbal indicators of pain absent. Safety maintained.

## 2019-05-24 NOTE — ED PROVIDER NOTE - PROGRESS NOTE DETAILS
Dr. Alicea Note: sepsis reassessment note: pt improved after fluid bolus and antibx, SBP from 80s-90s now to 110s-120s, respiratory status normal without tachypnea or retractions, skin warm and dry, rest of exam unchanged.  Labs reviewed, CXr reviewed, stable for admission to floors, prognosis guarded.

## 2019-05-24 NOTE — CONSULT NOTE ADULT - ASSESSMENT
68 yr old well known to me recently discharged from the hospital twice after prolong admission related to fever.  Advanced dementia with sever contractures , broken leg, and large decubitus  also has an chronic mrsa infection of left hip with a spacer that can not  be removed   Has been treated with courses  of  vanco mycin to suppress the mrsa infection whcih is felt to be the most likely source of fever.  stopped vanco a week ago  presents with new fever  probable from aspiration pna, but also could be UTI or decubitus related . also has  history of DVT    agree with empiric ab pending cultures and follow up chest xray in 48 hrs.  discussed with .    vanco and zosyn are reasonable 68 yr old well known to me recently discharged from the hospital twice after prolong admission related to fever.  Advanced dementia with sever contractures , broken leg, and large decubitus  also has an chronic mrsa infection of left hip with a spacer that can not  be removed   Has been treated with courses  of  vanco mycin to suppress the mrsa infection whcih is felt to be the most likely source of fever.  stopped vanco a week ago  presents with new fever  probable from aspiration pna, but also could be UTI or decubitus related . also has  history of DVT    agree with empiric ab pending cultures and follow up chest xray in 48 hrs.  discussed with .    vanco and cefepime  ( allergic to pcn possible meropenem rash) ?

## 2019-05-24 NOTE — ED PROVIDER NOTE - ATTENDING CONTRIBUTION TO CARE
Pt here with  presents with fever, hypotension, onset today, recent cessation of PICC line Vanco (PICC out) last dose Friday (5d ago).  Pt appears toxic, coarse bs, gavin in place, slight erythema of 2nd MCP R, contractures of hips b/l.  Consider aspiration pneumonia, cystitis, HCAP. Pt clinically septic.  Code status discussed, full code.

## 2019-05-24 NOTE — H&P ADULT - NSHPPHYSICALEXAM_GEN_ALL_CORE
Gen: NAD, lying in bed, functional quadriplegic, frail with contracted extremities, A&Ox0 - does not follow commands  	*HEENT: NC/AT, dry mucous membranes, airway patent, trachea midline  	*CV: RRR, S1/S2 present, no murmurs/rubs/gallops  	*Resp: + respiratory distress, diffuse rales  	*Abd: non-distended, soft N/Tx4, no guarding or rigidity, + PEG tube in place  	*Neuro: no focal neuro deficits, moving all limbs appropriately  	*Extremities: ***  	*Skin: large stage 4 sacral ulcer

## 2019-05-24 NOTE — ED ADULT NURSE REASSESSMENT NOTE - NS ED NURSE REASSESS COMMENT FT1
pt recived in [place] pt contracted non verbal  at bedside pt pending med bed assignment comfort care given pt linens changed sinus tachy on moniter gavin drainging alfredo urine pt with r leg immbilizer in place pt on monitering continuous oxygen 2 liter nasal canula in use

## 2019-05-25 LAB
-  COAGULASE NEGATIVE STAPHYLOCOCCUS: SIGNIFICANT CHANGE UP
ALBUMIN SERPL ELPH-MCNC: 3.1 G/DL — LOW (ref 3.3–5)
ALP SERPL-CCNC: 86 U/L — SIGNIFICANT CHANGE UP (ref 40–120)
ALT FLD-CCNC: 13 U/L — SIGNIFICANT CHANGE UP (ref 10–45)
ANION GAP SERPL CALC-SCNC: 16 MMOL/L — SIGNIFICANT CHANGE UP (ref 5–17)
AST SERPL-CCNC: 13 U/L — SIGNIFICANT CHANGE UP (ref 10–40)
BASOPHILS # BLD AUTO: 0.04 K/UL — SIGNIFICANT CHANGE UP (ref 0–0.2)
BASOPHILS NFR BLD AUTO: 0.2 % — SIGNIFICANT CHANGE UP (ref 0–2)
BILIRUB SERPL-MCNC: 0.5 MG/DL — SIGNIFICANT CHANGE UP (ref 0.2–1.2)
BUN SERPL-MCNC: 35 MG/DL — HIGH (ref 7–23)
CALCIUM SERPL-MCNC: 9.6 MG/DL — SIGNIFICANT CHANGE UP (ref 8.4–10.5)
CHLORIDE SERPL-SCNC: 95 MMOL/L — LOW (ref 96–108)
CO2 SERPL-SCNC: 25 MMOL/L — SIGNIFICANT CHANGE UP (ref 22–31)
CREAT SERPL-MCNC: 0.34 MG/DL — LOW (ref 0.5–1.3)
CULTURE RESULTS: SIGNIFICANT CHANGE UP
EOSINOPHIL # BLD AUTO: 0.51 K/UL — HIGH (ref 0–0.5)
EOSINOPHIL NFR BLD AUTO: 2 % — SIGNIFICANT CHANGE UP (ref 0–6)
GLUCOSE BLDC GLUCOMTR-MCNC: 126 MG/DL — HIGH (ref 70–99)
GLUCOSE BLDC GLUCOMTR-MCNC: 161 MG/DL — HIGH (ref 70–99)
GLUCOSE BLDC GLUCOMTR-MCNC: 182 MG/DL — HIGH (ref 70–99)
GLUCOSE SERPL-MCNC: 189 MG/DL — HIGH (ref 70–99)
GRAM STN FLD: SIGNIFICANT CHANGE UP
HCT VFR BLD CALC: 32.5 % — LOW (ref 34.5–45)
HGB BLD-MCNC: 10.2 G/DL — LOW (ref 11.5–15.5)
IMM GRANULOCYTES NFR BLD AUTO: 0.7 % — SIGNIFICANT CHANGE UP (ref 0–1.5)
LYMPHOCYTES # BLD AUTO: 0.32 K/UL — LOW (ref 1–3.3)
LYMPHOCYTES # BLD AUTO: 1.3 % — LOW (ref 13–44)
MCHC RBC-ENTMCNC: 30.1 PG — SIGNIFICANT CHANGE UP (ref 27–34)
MCHC RBC-ENTMCNC: 31.4 GM/DL — LOW (ref 32–36)
MCV RBC AUTO: 95.9 FL — SIGNIFICANT CHANGE UP (ref 80–100)
METHOD TYPE: SIGNIFICANT CHANGE UP
MONOCYTES # BLD AUTO: 0.4 K/UL — SIGNIFICANT CHANGE UP (ref 0–0.9)
MONOCYTES NFR BLD AUTO: 1.6 % — LOW (ref 2–14)
NEUTROPHILS # BLD AUTO: 23.84 K/UL — HIGH (ref 1.8–7.4)
NEUTROPHILS NFR BLD AUTO: 94.2 % — HIGH (ref 43–77)
PLATELET # BLD AUTO: 425 K/UL — HIGH (ref 150–400)
POTASSIUM SERPL-MCNC: 4.1 MMOL/L — SIGNIFICANT CHANGE UP (ref 3.5–5.3)
POTASSIUM SERPL-SCNC: 4.1 MMOL/L — SIGNIFICANT CHANGE UP (ref 3.5–5.3)
PROT SERPL-MCNC: 6.1 G/DL — SIGNIFICANT CHANGE UP (ref 6–8.3)
RBC # BLD: 3.39 M/UL — LOW (ref 3.8–5.2)
RBC # FLD: 16.1 % — HIGH (ref 10.3–14.5)
SODIUM SERPL-SCNC: 136 MMOL/L — SIGNIFICANT CHANGE UP (ref 135–145)
SPECIMEN SOURCE: SIGNIFICANT CHANGE UP
WBC # BLD: 25.28 K/UL — HIGH (ref 3.8–10.5)
WBC # FLD AUTO: 25.28 K/UL — HIGH (ref 3.8–10.5)

## 2019-05-25 RX ORDER — COLLAGENASE CLOSTRIDIUM HIST. 250 UNIT/G
1 OINTMENT (GRAM) TOPICAL DAILY
Refills: 0 | Status: DISCONTINUED | OUTPATIENT
Start: 2019-05-25 | End: 2019-05-27

## 2019-05-25 RX ORDER — DIPHENHYDRAMINE HCL 50 MG
25 CAPSULE ORAL EVERY 6 HOURS
Refills: 0 | Status: DISCONTINUED | OUTPATIENT
Start: 2019-05-25 | End: 2019-05-27

## 2019-05-25 RX ORDER — SODIUM CHLORIDE 9 MG/ML
500 INJECTION INTRAMUSCULAR; INTRAVENOUS; SUBCUTANEOUS ONCE
Refills: 0 | Status: COMPLETED | OUTPATIENT
Start: 2019-05-25 | End: 2019-05-25

## 2019-05-25 RX ORDER — ASCORBIC ACID 60 MG
500 TABLET,CHEWABLE ORAL DAILY
Refills: 0 | Status: DISCONTINUED | OUTPATIENT
Start: 2019-05-25 | End: 2019-05-27

## 2019-05-25 RX ADMIN — Medication 25 MILLIGRAM(S): at 00:38

## 2019-05-25 RX ADMIN — ALBUTEROL 2.5 MILLIGRAM(S): 90 AEROSOL, METERED ORAL at 06:52

## 2019-05-25 RX ADMIN — Medication 650 MILLIGRAM(S): at 15:49

## 2019-05-25 RX ADMIN — Medication 12.5 MILLIGRAM(S): at 18:42

## 2019-05-25 RX ADMIN — ALBUTEROL 2.5 MILLIGRAM(S): 90 AEROSOL, METERED ORAL at 12:50

## 2019-05-25 RX ADMIN — TIZANIDINE 4 MILLIGRAM(S): 4 TABLET ORAL at 09:46

## 2019-05-25 RX ADMIN — SODIUM CHLORIDE 500 MILLILITER(S): 9 INJECTION INTRAMUSCULAR; INTRAVENOUS; SUBCUTANEOUS at 15:49

## 2019-05-25 RX ADMIN — Medication 1 TABLET(S): at 09:46

## 2019-05-25 RX ADMIN — APIXABAN 2.5 MILLIGRAM(S): 2.5 TABLET, FILM COATED ORAL at 18:43

## 2019-05-25 RX ADMIN — Medication 1.5 MILLIGRAM(S): at 22:01

## 2019-05-25 RX ADMIN — Medication 0.5 MILLIGRAM(S): at 18:43

## 2019-05-25 RX ADMIN — TIZANIDINE 4 MILLIGRAM(S): 4 TABLET ORAL at 22:01

## 2019-05-25 RX ADMIN — Medication 25 MILLIGRAM(S): at 09:46

## 2019-05-25 RX ADMIN — GABAPENTIN 300 MILLIGRAM(S): 400 CAPSULE ORAL at 22:17

## 2019-05-25 RX ADMIN — Medication 250 MILLIGRAM(S): at 09:46

## 2019-05-25 RX ADMIN — QUETIAPINE FUMARATE 25 MILLIGRAM(S): 200 TABLET, FILM COATED ORAL at 22:01

## 2019-05-25 RX ADMIN — FAMOTIDINE 20 MILLIGRAM(S): 10 INJECTION INTRAVENOUS at 09:46

## 2019-05-25 RX ADMIN — Medication 1 APPLICATION(S): at 12:51

## 2019-05-25 RX ADMIN — Medication 650 MILLIGRAM(S): at 16:30

## 2019-05-25 RX ADMIN — Medication 12.5 MILLIGRAM(S): at 06:52

## 2019-05-25 RX ADMIN — ALBUTEROL 2.5 MILLIGRAM(S): 90 AEROSOL, METERED ORAL at 18:43

## 2019-05-25 RX ADMIN — GABAPENTIN 300 MILLIGRAM(S): 400 CAPSULE ORAL at 09:46

## 2019-05-25 RX ADMIN — Medication 25 MILLIGRAM(S): at 22:23

## 2019-05-25 RX ADMIN — Medication 0.5 MILLIGRAM(S): at 06:53

## 2019-05-25 RX ADMIN — APIXABAN 2.5 MILLIGRAM(S): 2.5 TABLET, FILM COATED ORAL at 06:52

## 2019-05-25 RX ADMIN — CEFEPIME 100 MILLIGRAM(S): 1 INJECTION, POWDER, FOR SOLUTION INTRAMUSCULAR; INTRAVENOUS at 18:44

## 2019-05-25 NOTE — DIETITIAN INITIAL EVALUATION ADULT. - ORAL INTAKE PTA
as per daughter, pt was tolerating 4 cans of Glucerna 1.2 via PEG at home (provides: 948ml total volume, 1140cal, 57 Gm Prot; 28cal/kg and ~1.4 Gm Prot/kg based on wt of 40.8 kg), daughter reports family was working on providing pt c Prostat liquid protein supplement at home, they were providing about 15-30ml at first and were going to work up to about 60ml which is what pt was receiving during previous admission; as per family, pt was receiving 240ml at each feed, 4 hours in between during waking hours only

## 2019-05-25 NOTE — PHYSICAL THERAPY INITIAL EVALUATION ADULT - ADDITIONAL COMMENTS
Pt lives with her family in a private home, 3 entry steps (+ rail). Pt sleeps in hospital bed (low air loss mattress) on 1st floor. Pt was previously transferred OOB to tilt in space wheelchair with use of pablo lift however spouse states pt remains in bed 100% of the time since her last hospital admission due to RLE fx. Pt has privately hired home health aides 5-10 hours/day x 7 days/week. Spouse states he is still awaiting home health aides from [facility] from last hospital admission. Family assists when aides are not present. Daily visiting RN for wound care per pt. Spouse performs dressing changes as soiled as well. Spouse performs finger sticks. Pt is dependent for all functional mobility & ADL's. Home O2 for comfort. + medical marijuana license per spouse report. Pt is minimally verbal. Incontinent of urine & stool, + chronic gavin for retention. Pt follows up with Dr. Shin as an outpatient. Failed VAC in the past.  Goal of therapy: retun home.

## 2019-05-25 NOTE — CHART NOTE - NSCHARTNOTEFT_GEN_A_CORE
Upon Nutritional Assessment by the Registered Dietitian your patient was determined to meet criteria / has evidence of the following diagnosis/diagnoses:          [ ]  Mild Protein Calorie Malnutrition        [x]  Moderate Protein Calorie Malnutrition        [ ] Severe Protein Calorie Malnutrition        [ ] Unspecified Protein Calorie Malnutrition        [ ] Underweight / BMI <19        [ ] Morbid Obesity / BMI > 40      Findings as based on:  [x] Comprehensive nutrition assessment- impaired skin integrity, increased demand for nutrients, h/o wt loss  [x] Nutrition Focused Physical Exam- moderate muscle/fat loss   [ ] Other:       Nutrition Plan/Recommendations:      As medically feasible, continue to provide 948ml total volume of Glucerna 1.2 daily (would provide only during waking hours: 8am, 12pm, 4pm, and 4pm). Recommend addition of 2 packets of No Carb Prosource (will provide an additional 120cal and 30 Gm Prot- increases overall enteral provision to: 1260cal, 87 Gm Prot; 31cal/kg based on wt of 40.8kg and 2 Gm Prot/kg based on wt of 42.1 kg).      PROVIDER Section:     By signing this assessment you are acknowledging and agree with the diagnosis/diagnoses assigned by the Registered Dietitian    Comments:

## 2019-05-25 NOTE — PHYSICAL THERAPY INITIAL EVALUATION ADULT - MODALITIES TREATMENT COMMENTS
Pt is on a NCR [employer] P500 low air loss surface. Pt with multiple fixed contractures. Pt has stage IV to sacrum, unstageable to L lateral malleolus, L heel DTI, & 2 unstageable pressure injuries over T/S. Pt also with IAD & MASD. Please review flowsheet # 2 for more information.

## 2019-05-25 NOTE — DIETITIAN INITIAL EVALUATION ADULT. - OTHER INFO
Pt seen for consult for "BMI<18, enteral/parenteral nutrition PTA and pressure injury stage II or greater." As per daughter, wt has been stable around 90 pounds, noted as per previous RD note from March 2019, pt weighed about 92.5 pounds and current admit wt of 89.8 pounds. As per daughter, pt jennifer JUAREZ, was taking vitamin D and multivitamin supplements at home.  Daughter confirms pt has taken Iron in the past and did not tolerate it. Reports pt had also been on Banatrol and it did not help with BMs in the past. Pt seen for consult for "BMI<18, enteral/parenteral nutrition PTA and pressure injury stage II or greater." As per daughter, wt has been stable around 90 pounds, noted as per previous RD note from March 2019, pt weighed about 92.5 pounds and current admit wt of 89.8 pounds. Noted wt of 78 pounds as per previous RD note from February 2019, pt possibly c some wt gain in between February and March once she was on a tube feed regimen she tolerated. As per daughter, pt jennifer JUAREZ, was taking vitamin D and multivitamin supplements at home.  Daughter confirms pt has taken Iron in the past and did not tolerate it. Reports pt had also been on Banatrol and it did not help with BMs in the past.

## 2019-05-25 NOTE — DIETITIAN INITIAL EVALUATION ADULT. - NS AS NUTRI INTERV MEDICAL AND FOOD SUPPLEMENTS
Recommend addition of 2 packets of No Carb Prosource (will provide an additional 120cal and 30 Gm Prot- increases overall enteral provision to: 1260cal, 87 Gm Prot; 31cal/kg based on wt of 40.8kg and 2 Gm Prot/kg based on wt of 42.1 kg).

## 2019-05-25 NOTE — PROGRESS NOTE ADULT - SUBJECTIVE AND OBJECTIVE BOX
---___---___---___---___---___---___ ---___---___---___---___---___---___---___---___---                  M E D I C A L   A T T E N D I N G   P R O G R E S S   N O T E  ---___---___---___---___---___---___ ---___---___---___---___---___---___---___---___---        ================================================    ++CHIEF COMPLAINT:   Patient is a 69y old  Female who presents with a chief complaint of pneumonia (24 May 2019 11:17)      Pneumonia    ---___---___---___---___---___---  PAST MEDICAL / Surgical  HISTORY:  PAST MEDICAL & SURGICAL HISTORY:  CVA (cerebral vascular accident)  Fatty pancreas  PNA (pneumonia)  Pulmonary HTN  IGT (impaired glucose tolerance)  Ulcerative colitis  Acid reflux  Anxiety  Depression  Mouth sores  HLD (hyperlipidemia)  Asthma  Humeral head fracture  H/O: hysterectomy  H/O cataract extraction, left  History of knee replacement      ---___---___---___---___---___---  FAMILY HISTORY:   FAMILY HISTORY:  Family history of dementia (Grandparent)  Family history of colon cancer (Grandparent)        ---___---___---___---___---___---  ALLERGIES:   Allergies    ASA; dye contrast (Anaphylaxis)  aspirin (Short breath)  divalproex sodium (Other (Unknown))  Flowers and Plants (Short breath)  Haldol (Other (Unknown))  penicillin (Short breath; Rash)  sulfa drugs (Short breath; Rash)  vancomycin (Rash; Urticaria; Hives)  Xanax (Other (Unknown))    Intolerances        ---___---___---___---___---___---  MEDICATIONS:  MEDICATIONS  (STANDING):  ALBUTerol    0.083% 2.5 milliGRAM(s) Nebulizer every 6 hours  ALBUTerol    90 MICROgram(s) HFA Inhaler 1 Puff(s) Inhalation every 4 hours  apixaban 2.5 milliGRAM(s) Oral every 12 hours  Awake Gel Capsules 20:1 (THC:CBD) 2 Capsule(s) 2 Capsule(s) Oral <User Schedule>  buDESOnide   0.5 milliGRAM(s) Respule 0.5 milliGRAM(s) Inhalation every 12 hours  Calm Drops 50:1 (THC:CBD) 2 milliLiter(s) 2 milliLiter(s) Oral <User Schedule>  cefepime   IVPB 1000 milliGRAM(s) IV Intermittent every 12 hours  clonazePAM  Tablet 1.5 milliGRAM(s) Oral at bedtime  collagenase Ointment 1 Application(s) Topical daily  diphenoxylate/atropine 1 Tablet(s) Oral daily  famotidine   Suspension 20 milliGRAM(s) Oral daily  gabapentin   Solution 300 milliGRAM(s) Oral two times a day  [place] Drops 1:1 THC:CBD 3 milliLiter(s) 3 milliLiter(s) Oral <User Schedule>  lisinopril 5 milliGRAM(s) Oral daily  metoprolol tartrate 12.5 milliGRAM(s) Oral two times a day  QUEtiapine 25 milliGRAM(s) Oral at bedtime  tiZANidine 4 milliGRAM(s) Oral <User Schedule>  vancomycin  IVPB 750 milliGRAM(s) IV Intermittent every 12 hours    MEDICATIONS  (PRN):  acetaminophen    Suspension .. 650 milliGRAM(s) Oral every 6 hours PRN Temp greater or equal to 38C (100.4F), Moderate Pain (4 - 6)  diphenhydrAMINE   Injectable 25 milliGRAM(s) IV Push every 6 hours PRN Rash      ---___---___---___---___---___---  REVIEW OF SYSTEM:    unable to obtain   ---___---___---___---___---___---  VITAL SIGNS:  69y , CAPILLARY BLOOD GLUCOSE      POCT Blood Glucose.: 161 mg/dL (25 May 2019 13:29)    T(C): 38.1 (19 @ 15:38), Max: 38.1 (19 @ 15:38)  HR: 112 (19 @ 15:38) (102 - 120)  BP: 87/56 (19 @ 15:38) (87/56 - 137/76)  RR: 18 (19 @ 15:38) (18 - 20)  SpO2: 94% (19 @ 15:38) (93% - 100%)  ---___---___---___---___---___---  PHYSICAL EXAM:    GEN: A&O X 1 , NAD , comfortable  HEENT: NCAT, PERRL, MMM, hearing intact  Neck: supple , no JVD  CVS: S1S2 , regular , No M/R/G appreciated  PULM: CTA B/L,  no W/R/R appreciated  ABD.: soft. non tender, non distended,  bowel sounds present  Extrem: intact pulses , no edema  left leg in brace   Derm: No rash , no ecchymoses large sacral decubitus         ---___---___---___---___---___---            LAB AND IMAGING:                          10.2    )-----------( 425      ( 25 May 2019 10:35 )             32.5               05-    136  |  95<L>  |  35<H>  ----------------------------<  189<H>  4.1   |  25  |  0.34<L>    Ca    9.6      25 May 2019 06:56    TPro  6.1  /  Alb  3.1<L>  /  TBili  0.5  /  DBili  x   /  AST  13  /  ALT  13  /  AlkPhos  86  05-25    PT/INR - ( 24 May 2019 01:12 )   PT: 17.3 sec;   INR: 1.50 ratio         PTT - ( 24 May 2019 01:12 )  PTT:41.3 sec                        Urinalysis Basic - ( 24 May 2019 02:17 )    Color: Yellow / Appearance: Slightly Turbid / S.021 / pH: x  Gluc: x / Ketone: Negative  / Bili: Negative / Urobili: Negative   Blood: x / Protein: 30 mg/dL / Nitrite: Negative   Leuk Esterase: Large / RBC: 27 /hpf /  /HPF   Sq Epi: x / Non Sq Epi: 4 / Bacteria: Negative        [All pertinent / recent Imaging reviewed]         ---___---___---___---___---___---___ ---___---___---___---___---                         A S S E S S M E N T   A N D   P L A N :      HEALTH ISSUES - PROBLEM Dx:  Ulcerative proctitis without complication: Ulcerative proctitis without complication continue lootil   IGT (impaired glucose tolerance): IGT (impaired glucose tolerance) follow lab  Depression: Depression continue meds  Anxiety: Anxiety continue meds  Sepsis, due to unspecified organism: Sepsis, due to unspecified organism on iv abx   Aspiration pneumonia of right middle lobe, unspecified aspiration pneumonia type: Aspiration pneumonia of right middle lobe, unspecified aspiration pneumonia type  hypotension iv bolused 500cc ns   -GI/DVT Prophylaxis. on eliquis    --------------------------------------------  Case discussed with   Education given on   ___________________________  Thank you,  Deniz Bolden  [phone]

## 2019-05-25 NOTE — DIETITIAN INITIAL EVALUATION ADULT. - PHYSICAL APPEARANCE
nutrition focused physical exam conducted c daughter and aide at bedside, pt c moderate muscle loss around temples, shoulders, clavicles, moderate fat loss around orbital, buccal and tricep region- however, pt is bedbound, muscle loss is expected

## 2019-05-25 NOTE — PHYSICAL THERAPY INITIAL EVALUATION ADULT - GENERAL OBSERVATIONS, REHAB EVAL
Pt encountered semi-supine in bed + IV lock, PEG (clamped), hinged brace to RLE, gavin, O2 via nasal cannula at 2L/min, multiple soiled dressings

## 2019-05-26 LAB
C DIFF GDH STL QL: NEGATIVE — SIGNIFICANT CHANGE UP
C DIFF GDH STL QL: SIGNIFICANT CHANGE UP
CULTURE RESULTS: SIGNIFICANT CHANGE UP
GLUCOSE BLDC GLUCOMTR-MCNC: 103 MG/DL — HIGH (ref 70–99)
GLUCOSE BLDC GLUCOMTR-MCNC: 113 MG/DL — HIGH (ref 70–99)
GLUCOSE BLDC GLUCOMTR-MCNC: 143 MG/DL — HIGH (ref 70–99)
ORGANISM # SPEC MICROSCOPIC CNT: SIGNIFICANT CHANGE UP
ORGANISM # SPEC MICROSCOPIC CNT: SIGNIFICANT CHANGE UP
SPECIMEN SOURCE: SIGNIFICANT CHANGE UP

## 2019-05-26 PROCEDURE — 99232 SBSQ HOSP IP/OBS MODERATE 35: CPT

## 2019-05-26 RX ORDER — SODIUM CHLORIDE 9 MG/ML
250 INJECTION INTRAMUSCULAR; INTRAVENOUS; SUBCUTANEOUS ONCE
Refills: 0 | Status: COMPLETED | OUTPATIENT
Start: 2019-05-26 | End: 2019-05-26

## 2019-05-26 RX ORDER — VANCOMYCIN HCL 1 G
750 VIAL (EA) INTRAVENOUS EVERY 12 HOURS
Refills: 0 | Status: DISCONTINUED | OUTPATIENT
Start: 2019-05-26 | End: 2019-05-27

## 2019-05-26 RX ORDER — SODIUM CHLORIDE 9 MG/ML
1000 INJECTION INTRAMUSCULAR; INTRAVENOUS; SUBCUTANEOUS
Refills: 0 | Status: DISCONTINUED | OUTPATIENT
Start: 2019-05-26 | End: 2019-05-28

## 2019-05-26 RX ADMIN — Medication 1 TABLET(S): at 13:10

## 2019-05-26 RX ADMIN — GABAPENTIN 300 MILLIGRAM(S): 400 CAPSULE ORAL at 09:59

## 2019-05-26 RX ADMIN — Medication 25 MILLIGRAM(S): at 11:13

## 2019-05-26 RX ADMIN — Medication 1.5 MILLIGRAM(S): at 22:42

## 2019-05-26 RX ADMIN — Medication 500 MILLIGRAM(S): at 13:10

## 2019-05-26 RX ADMIN — SODIUM CHLORIDE 75 MILLILITER(S): 9 INJECTION INTRAMUSCULAR; INTRAVENOUS; SUBCUTANEOUS at 20:50

## 2019-05-26 RX ADMIN — TIZANIDINE 4 MILLIGRAM(S): 4 TABLET ORAL at 09:59

## 2019-05-26 RX ADMIN — Medication 0.5 MILLIGRAM(S): at 17:37

## 2019-05-26 RX ADMIN — APIXABAN 2.5 MILLIGRAM(S): 2.5 TABLET, FILM COATED ORAL at 06:44

## 2019-05-26 RX ADMIN — Medication 25 MILLIGRAM(S): at 22:52

## 2019-05-26 RX ADMIN — Medication 1 APPLICATION(S): at 13:10

## 2019-05-26 RX ADMIN — Medication 650 MILLIGRAM(S): at 20:50

## 2019-05-26 RX ADMIN — TIZANIDINE 4 MILLIGRAM(S): 4 TABLET ORAL at 20:49

## 2019-05-26 RX ADMIN — Medication 62.5 MILLIGRAM(S): at 00:15

## 2019-05-26 RX ADMIN — SODIUM CHLORIDE 1000 MILLILITER(S): 9 INJECTION INTRAMUSCULAR; INTRAVENOUS; SUBCUTANEOUS at 11:11

## 2019-05-26 RX ADMIN — QUETIAPINE FUMARATE 25 MILLIGRAM(S): 200 TABLET, FILM COATED ORAL at 22:42

## 2019-05-26 RX ADMIN — ALBUTEROL 2.5 MILLIGRAM(S): 90 AEROSOL, METERED ORAL at 00:35

## 2019-05-26 RX ADMIN — ALBUTEROL 2.5 MILLIGRAM(S): 90 AEROSOL, METERED ORAL at 06:52

## 2019-05-26 RX ADMIN — APIXABAN 2.5 MILLIGRAM(S): 2.5 TABLET, FILM COATED ORAL at 17:37

## 2019-05-26 RX ADMIN — CEFEPIME 100 MILLIGRAM(S): 1 INJECTION, POWDER, FOR SOLUTION INTRAMUSCULAR; INTRAVENOUS at 07:46

## 2019-05-26 RX ADMIN — CEFEPIME 100 MILLIGRAM(S): 1 INJECTION, POWDER, FOR SOLUTION INTRAMUSCULAR; INTRAVENOUS at 17:37

## 2019-05-26 RX ADMIN — FAMOTIDINE 20 MILLIGRAM(S): 10 INJECTION INTRAVENOUS at 13:10

## 2019-05-26 RX ADMIN — Medication 62.5 MILLIGRAM(S): at 12:10

## 2019-05-26 RX ADMIN — ALBUTEROL 2.5 MILLIGRAM(S): 90 AEROSOL, METERED ORAL at 17:37

## 2019-05-26 RX ADMIN — Medication 0.5 MILLIGRAM(S): at 06:52

## 2019-05-26 RX ADMIN — ALBUTEROL 2.5 MILLIGRAM(S): 90 AEROSOL, METERED ORAL at 13:09

## 2019-05-26 RX ADMIN — GABAPENTIN 300 MILLIGRAM(S): 400 CAPSULE ORAL at 20:49

## 2019-05-26 NOTE — PROGRESS NOTE ADULT - SUBJECTIVE AND OBJECTIVE BOX
CC: PNA    Saw/spoke to patient. Low grade fever yesterday, no chills. Patient poorly verbal, not able to interact with me.  at bedside.    Allergies  ASA; dye contrast (Anaphylaxis)  aspirin (Short breath)  divalproex sodium (Other (Unknown))  Flowers and Plants (Short breath)  Haldol (Other (Unknown))  penicillin (Short breath; Rash)  sulfa drugs (Short breath; Rash)  vancomycin (Rash; Urticaria; Hives)  Xanax (Other (Unknown))    ANTIMICROBIALS:  cefepime   IVPB 1000 every 12 hours  vancomycin  IVPB 750 every 12 hours    PE:    Vital Signs Last 24 Hrs  T(C): 36.7 (26 May 2019 09:53), Max: 38.1 (25 May 2019 15:38)  T(F): 98.1 (26 May 2019 09:53), Max: 100.6 (25 May 2019 15:38)  HR: 113 (26 May 2019 10:41) (103 - 125)  BP: 94/57 (26 May 2019 10:41) (87/56 - 99/66)  RR: 18 (26 May 2019 09:53) (16 - 20)  SpO2: 96% (26 May 2019 09:53) (93% - 99%)    Gen: AOx0, poorly responsive  CV: S1+S2 normal, tachycardic  Resp: Clear bilat, no resp distress, no crackles/wheezes  Abd: Soft, nontender, +BS, PEG  Ext: No LE edema, no wounds  Skin: macular rash LE    LABS:                        10.2   25.28 )-----------( 425      ( 25 May 2019 10:35 )             32.5     05-25    136  |  95<L>  |  35<H>  ----------------------------<  189<H>  4.1   |  25  |  0.34<L>    Ca    9.6      25 May 2019 06:56    TPro  6.1  /  Alb  3.1<L>  /  TBili  0.5  /  DBili  x   /  AST  13  /  ALT  13  /  AlkPhos  86  05-25    MICROBIOLOGY:    .Urine  05-24-19   >=3 organisms. Probable collection contamination.    .Blood  05-24-19   Growth in anaerobic bottle: Gram positive cocci in pairs  CoNS    .Blood  04-28-19   No growth at 5 days.  --    Growth in aerobic bottle: Gram Positive Cocci in Clusters    .Blood  04-26-19   No growth at 5 days.    Clostridium difficile [facility] Toxins A&amp;B, EIA:   Negative (05-26-19 @ 00:02)    RADIOLOGY:    5/24 CXR:    1.  Question left lower lung opacity.  2.  Right middle lung atelectasis.

## 2019-05-26 NOTE — PROGRESS NOTE ADULT - SUBJECTIVE AND OBJECTIVE BOX
---___---___---___---___---___---___ ---___---___---___---___---___---___---___---___---                  M E D I C A L   A T T E N D I N G   P R O G R E S S   N O T E  ---___---___---___---___---___---___ ---___---___---___---___---___---___---___---___---        ================================================    ++CHIEF COMPLAINT:   Patient is a 69y old  Female who presents with a chief complaint of pneumonia (25 May 2019 17:11)      Pneumonia has slight rash likely from vancomycin     ---___---___---___---___---___---  PAST MEDICAL / Surgical  HISTORY:  PAST MEDICAL & SURGICAL HISTORY:  CVA (cerebral vascular accident)  Fatty pancreas  PNA (pneumonia)  Pulmonary HTN  IGT (impaired glucose tolerance)  Ulcerative colitis  Acid reflux  Anxiety  Depression  Mouth sores  HLD (hyperlipidemia)  Asthma  Humeral head fracture  H/O: hysterectomy  H/O cataract extraction, left  History of knee replacement      ---___---___---___---___---___---  FAMILY HISTORY:   FAMILY HISTORY:  Family history of dementia (Grandparent)  Family history of colon cancer (Grandparent)        ---___---___---___---___---___---  ALLERGIES:   Allergies    ASA; dye contrast (Anaphylaxis)  aspirin (Short breath)  divalproex sodium (Other (Unknown))  Flowers and Plants (Short breath)  Haldol (Other (Unknown))  penicillin (Short breath; Rash)  sulfa drugs (Short breath; Rash)  vancomycin (Rash; Urticaria; Hives)  Xanax (Other (Unknown))    Intolerances        ---___---___---___---___---___---  MEDICATIONS:  MEDICATIONS  (STANDING):  ALBUTerol    0.083% 2.5 milliGRAM(s) Nebulizer every 6 hours  ALBUTerol    90 MICROgram(s) HFA Inhaler 1 Puff(s) Inhalation every 4 hours  apixaban 2.5 milliGRAM(s) Oral every 12 hours  ascorbic acid 500 milliGRAM(s) Oral daily  Awake Gel Capsules 20:1 (THC:CBD) 2 Capsule(s) 2 Capsule(s) Oral <User Schedule>  buDESOnide   0.5 milliGRAM(s) Respule 0.5 milliGRAM(s) Inhalation every 12 hours  Calm Drops 50:1 (THC:CBD) 2 milliLiter(s) 2 milliLiter(s) Oral <User Schedule>  cefepime   IVPB 1000 milliGRAM(s) IV Intermittent every 12 hours  clonazePAM  Tablet 1.5 milliGRAM(s) Oral at bedtime  collagenase Ointment 1 Application(s) Topical daily  diphenoxylate/atropine 1 Tablet(s) Oral daily  famotidine   Suspension 20 milliGRAM(s) Oral daily  gabapentin   Solution 300 milliGRAM(s) Oral two times a day  [place] Drops 1:1 THC:CBD 3 milliLiter(s) 3 milliLiter(s) Oral <User Schedule>  lisinopril 5 milliGRAM(s) Oral daily  metoprolol tartrate 12.5 milliGRAM(s) Oral two times a day  multivitamin 1 Tablet(s) Oral daily  QUEtiapine 25 milliGRAM(s) Oral at bedtime  sodium chloride 0.9%. 1000 milliLiter(s) (75 mL/Hr) IV Continuous <Continuous>  tiZANidine 4 milliGRAM(s) Oral <User Schedule>  vancomycin  IVPB 750 milliGRAM(s) IV Intermittent every 12 hours    MEDICATIONS  (PRN):  acetaminophen    Suspension .. 650 milliGRAM(s) Oral every 6 hours PRN Temp greater or equal to 38C (100.4F), Moderate Pain (4 - 6)  diphenhydrAMINE   Injectable 25 milliGRAM(s) IV Push every 6 hours PRN Rash      ---___---___---___---___---___---  REVIEW OF SYSTEM:    unable to obtain     ---___---___---___---___---___---  VITAL SIGNS:  69y , CAPILLARY BLOOD GLUCOSE      POCT Blood Glucose.: 103 mg/dL (26 May 2019 00:18)    T(C): 36.8 (05-26-19 @ 01:39), Max: 38.1 (05-25-19 @ 15:38)  HR: 103 (05-25-19 @ 22:02) (103 - 120)  BP: 98/52 (05-26-19 @ 00:45) (87/56 - 108/67)  RR: 18 (05-25-19 @ 22:02) (17 - 20)  SpO2: 98% (05-25-19 @ 22:02) (93% - 100%)  ---___---___---___---___---___---  PHYSICAL EXAM:    GEN: A&O X 0 , NAD , comfortable  HEENT: NCAT, PERRL, MMM, hearing intact  Neck: supple , no JVD  CVS: S1S2 , regular , No M/R/G appreciated  PULM: CTA B/L,  no W/R/R appreciated  ABD.: soft. non tender, non distended,  bowel sounds present peg noted   Extrem: intact pulses , no edema   Derm: lasrge stage 4 sacral decubitus noted        ---___---___---___---___---___---            LAB AND IMAGING:                          10.2   25.28 )-----------( 425      ( 25 May 2019 10:35 )             32.5               05-25    136  |  95<L>  |  35<H>  ----------------------------<  189<H>  4.1   |  25  |  0.34<L>    Ca    9.6      25 May 2019 06:56    TPro  6.1  /  Alb  3.1<L>  /  TBili  0.5  /  DBili  x   /  AST  13  /  ALT  13  /  AlkPhos  86  05-25                                [All pertinent / recent Imaging reviewed]         ---___---___---___---___---___---___ ---___---___---___---___---                         A S S E S S M E N T   A N D   P L A N :      HEALTH ISSUES - PROBLEM Dx:  metabolic encephalopathy  on iv fluid abx   hypotension continue iv fluid   h/o chf  for now hold meds due to low bp   sacral decubitus continue wound care  anxiety/ deprerssion on klonopin and seroqul   on Eliquis for dvt   left leg fracture on brace   muscle spasm and contractures on xanaflex  asthma on budesonide  -GI/DVT Prophylaxis.    --------------------------------------------  Case discussed with  ight np  give low flow iv fluids 75 cc /hr  hypotensive likley secondary to red man syndrome from [facility]   Education given on   ___________________________  Thank you,  Deniz Bolden  [phone]

## 2019-05-26 NOTE — PROGRESS NOTE ADULT - ASSESSMENT
67 yo discharged from the hospital twice after prolong admission related to fever  Advanced dementia with severe contractures , broken leg, and large decubitus  also has an chronic mrsa infection of left hip with a spacer that can not  be removed     Has been treated with courses of vancomycin to suppress the mrsa infection which prior was suspected to be the most likely source of fever (PICC/vanco stopped a week ago)    New fever, ? aspiration pneumonia vs UTI/sacral wound infection/DVT  BCX 1/4 CoNS, contam?  WBC trending down, today's CBC pending  Low grade fever to 100.6F, otherwise no further fever  Overall, fever, leukocytosis, aspiration pneumonia, ? allergy to meropenem  - Vanco 750mg q 12 (trough before 4th dose)  - Cefepime 1g q 12  - F/U BCX; ordered repeat BCX for tomorrow AM  - Trend WBC  - Monitor for further focal sources    Deniz Bautista MD  Pager [phone]  After 5pm and on weekends call [phone]

## 2019-05-26 NOTE — CHART NOTE - NSCHARTNOTEFT_GEN_A_CORE
Patient is a 69y old  Female who presents with a chief complaint of pneumonia (25 May 2019 17:11)  Called by RN to relate pt's  was concerned about pt's BP and urine output  Pt's  seen at bedside, and his concerns were addressed  Pt seen and examined at bedside, she is sleeping, appears in NAD, not dyspneic or tachypneic  VS reviewed, most recent BP 98/52 man.; urine output thus far is 200 mL  Pt was given NS bolus 500 mL yesterday evening  Pt's  also mentioned that Vancomycin is to be given over 4 hrs. per d/w Dr. Brand to avoid vibha., and order changed accordingly        Vital Signs Last 24 Hrs  T(C): 37.2 (25 May 2019 22:02), Max: 38.1 (25 May 2019 15:38)  T(F): 98.9 (25 May 2019 22:02), Max: 100.6 (25 May 2019 15:38)  HR: 103 (25 May 2019 22:02) (103 - 120)  BP: 98/52 (26 May 2019 00:45) (87/56 - 108/67)  BP(mean): --  RR: 18 (25 May 2019 22:02) (17 - 20)  SpO2: 98% (25 May 2019 22:02) (93% - 100%)      Labs:                        10.2   25.28 )-----------( 425      ( 25 May 2019 10:35 )             32.5     05-25    136  |  95<L>  |  35<H>  ----------------------------<  189<H>  4.1   |  25  |  0.34<L>    Ca    9.6      25 May 2019 06:56  TPro  6.1  /  Alb  3.1<L>  /  TBili  0.5  /  DBili  x   /  AST  13  /  ALT  13  /  AlkPhos  86  05-25    Culture - Blood (05.24.19 @ 02:36)    Specimen Source: .Blood    Culture Results:   No growth to date.    Culture - Blood (05.24.19 @ 02:36)    Gram Stain:   Growth in anaerobic bottle: Gram positive cocci in pairs    -  Coagulase negative Staphylococcus: Detec    Specimen Source: .Blood    Organism: Blood Culture PCR    Culture Results:   Growth in anaerobic bottle: Gram positive cocci in pairs        Radiology:    Physical Exam:  General: frail female, in NAD, sleeping  Neurology:  Non verbal  Respiratory: + Rales Lt lung field, Rt CTA  CV: RRR, S1S2, no murmur  Abdominal: Soft, Non tender, non distended, + BS; PEG in place  MSK: Dustin LE edema noted; + distal pulses     Assessment & Plan:  HPI:  68 yr old well known to me recently discharged from the hospital twice after prolong admission related to fever.  Advanced dementia with sever contractures , broken leg, and large decubitus  also has an chronic mrsa infection of left hip with a spacer that can not  be removed   Has been treated with courses  of  vanco mycin to suppress the mrsa infection whcih is felt to be the most likely source of fever.  stopped vanco a week ago  presents with new fever  probable from aspiration pna, but also could be UTI or decubitus related . also has  history of DVT    Pt evaluated for 's concerns for BP in the 90's, and low urine output  Pt assessed, appeared in NAD, hemodynamically stable    PLAN:  >Continue to monitor  >Will give Trial of IVF NS @ 75 mL/H overnight  >Monitor urinary output  >Continue IV antibiotics as prescribed         -Growth in anaerobic bottle GPC , + Coag Neg. Staph detected by PCR  >Will endorse to day team; follow up with attending

## 2019-05-26 NOTE — CHART NOTE - NSCHARTNOTEFT_GEN_A_CORE
Interval events    CC: Fever    Notified by RN that patient febrile with temperature of 101.2 F ( axillary, patient is a mouth breather, oral temp 98.1 F). Per RN pt appears warm and flushed.  received Tylenol  Evaluated the patient at bedside. patient alert, non verbal, extremities with contractures  in no acute distress    Vital Signs Last 24 Hrs  T(C): 38.4 (26 May 2019 20:31), Max: 38.4 (26 May 2019 20:31)  T(F): 101.1 (26 May 2019 20:31), Max: 101.1 (26 May 2019 20:31)  HR: 125 (26 May 2019 20:31) (84 - 125)  BP: 103/55 (26 May 2019 20:31) (91/50 - 110/77)  BP(mean): --  RR: 20 (26 May 2019 20:31) (16 - 20)  SpO2: 96% (26 May 2019 20:31) (93% - 98%)                          10.2   25.28 )-----------( 425      ( 25 May 2019 10:35 )             32.5   Culture - Blood (05.24.19 @ 02:36)    Specimen Source: .Blood    Culture Results:   No growth to date.      68 yr old female recently discharged from the hospital twice after prolong admission related to fever.  Advanced dementia with sever contractures , broken leg, and large decubitus  also has an chronic mrsa infection of left hip with a spacer that can not  be removed   Has been treated with courses  of  vanco mycin to suppress the mrsa infection whcih is felt to be the most likely source of fever.  stopped vanco a week ago  presents with new fever  probable from aspiration pna, but also could be UTI or decubitus related . also has  history of DVT.  Patient now with fever of 101.1 F, had fever yesterday also  Bp soft, tachycardiac likely r/t fever, BB not received during day due to soft BP    Sepsis/Asp PNA/DVT  Last Bcx sent on 05/24  Repeat Bcx for tomorrow am  if pt spikes high grade fever, will repeat Bcx tonight  Tylenol PRN  c/w Vanco/ Cefepime  Monitor I/O  F/U with ID in am  Will follow    Aga Zamarripa P BC  [id number] Interval events    CC: Fever    Notified by RN that patient febrile with temperature of 101.2 F ( axillary, patient is a mouth breather, oral temp 98.1 F). Per RN pt appears warm and flushed.  received Tylenol  Evaluated the patient at bedside. patient alert, non verbal, extremities with contractures  in no acute distress    Vital Signs Last 24 Hrs  T(C): 38.4 (26 May 2019 20:31), Max: 38.4 (26 May 2019 20:31)  T(F): 101.1 (26 May 2019 20:31), Max: 101.1 (26 May 2019 20:31)  HR: 125 (26 May 2019 20:31) (84 - 125)  BP: 103/55 (26 May 2019 20:31) (91/50 - 110/77)  BP(mean): --  RR: 20 (26 May 2019 20:31) (16 - 20)  SpO2: 96% (26 May 2019 20:31) (93% - 98%)                          10.2   25.28 )-----------( 425      ( 25 May 2019 10:35 )             32.5   Culture - Blood (05.24.19 @ 02:36)    Specimen Source: .Blood    Culture Results:   No growth to date.    Physical exam    General  AOx0, non verbal  CV: S1+S2 normal, tachycardic  Resp: Clear bilat, no resp distress, no crackles/wheezes  Abd: Soft, nontender, +BS, PEG tube  Ext: contracted, 2+ edema b/l LE  Skin: macular rash LE    68 yr old female recently discharged from the hospital twice after prolong admission related to fever.  Advanced dementia with sever contractures , broken leg, and large decubitus  also has an chronic mrsa infection of left hip with a spacer that can not  be removed   Has been treated with courses  of  vanco mycin to suppress the mrsa infection whcih is felt to be the most likely source of fever.  stopped vanco a week ago  presents with new fever  probable from aspiration pna, but also could be UTI or decubitus related . also has  history of DVT.  Patient now with fever of 101.1 F, had fever yesterday also  Bp soft, tachycardiac likely r/t fever, BB not received during day due to soft BP    Sepsis/Asp PNA/DVT  Last Bcx sent on 05/24  Repeat Bcx for tomorrow am  if pt spikes high grade fever, will repeat Bcx tonight  Tylenol PRN  c/w Vanco/ Cefepime  Monitor I/O  F/U with ID in am  Will follow    [place] bc  [phone]

## 2019-05-27 LAB
ALBUMIN SERPL ELPH-MCNC: 2 G/DL — LOW (ref 3.3–5)
ALP SERPL-CCNC: 63 U/L — SIGNIFICANT CHANGE UP (ref 40–120)
ALT FLD-CCNC: 13 U/L — SIGNIFICANT CHANGE UP (ref 10–45)
ANION GAP SERPL CALC-SCNC: 11 MMOL/L — SIGNIFICANT CHANGE UP (ref 5–17)
ANION GAP SERPL CALC-SCNC: 12 MMOL/L — SIGNIFICANT CHANGE UP (ref 5–17)
APTT BLD: 27.6 SEC — SIGNIFICANT CHANGE UP (ref 27.5–36.3)
AST SERPL-CCNC: 18 U/L — SIGNIFICANT CHANGE UP (ref 10–40)
BILIRUB SERPL-MCNC: 0.2 MG/DL — SIGNIFICANT CHANGE UP (ref 0.2–1.2)
BUN SERPL-MCNC: 29 MG/DL — HIGH (ref 7–23)
BUN SERPL-MCNC: 33 MG/DL — HIGH (ref 7–23)
CALCIUM SERPL-MCNC: 7.7 MG/DL — LOW (ref 8.4–10.5)
CALCIUM SERPL-MCNC: 7.7 MG/DL — LOW (ref 8.4–10.5)
CHLORIDE SERPL-SCNC: 95 MMOL/L — LOW (ref 96–108)
CHLORIDE SERPL-SCNC: 98 MMOL/L — SIGNIFICANT CHANGE UP (ref 96–108)
CO2 SERPL-SCNC: 20 MMOL/L — LOW (ref 22–31)
CO2 SERPL-SCNC: 21 MMOL/L — LOW (ref 22–31)
CREAT SERPL-MCNC: 0.34 MG/DL — LOW (ref 0.5–1.3)
CREAT SERPL-MCNC: 0.38 MG/DL — LOW (ref 0.5–1.3)
GAS PNL BLDA: SIGNIFICANT CHANGE UP
GLUCOSE BLDC GLUCOMTR-MCNC: 127 MG/DL — HIGH (ref 70–99)
GLUCOSE BLDC GLUCOMTR-MCNC: 175 MG/DL — HIGH (ref 70–99)
GLUCOSE SERPL-MCNC: 174 MG/DL — HIGH (ref 70–99)
GLUCOSE SERPL-MCNC: 198 MG/DL — HIGH (ref 70–99)
HCT VFR BLD CALC: 23.7 % — LOW (ref 34.5–45)
HCT VFR BLD CALC: 24.8 % — LOW (ref 34.5–45)
HGB BLD-MCNC: 8.2 G/DL — LOW (ref 11.5–15.5)
HGB BLD-MCNC: 8.5 G/DL — LOW (ref 11.5–15.5)
INR BLD: 1.38 RATIO — HIGH (ref 0.88–1.16)
LACTATE SERPL-SCNC: 1 MMOL/L — SIGNIFICANT CHANGE UP (ref 0.7–2)
MAGNESIUM SERPL-MCNC: 1.5 MG/DL — LOW (ref 1.6–2.6)
MCHC RBC-ENTMCNC: 31.9 PG — SIGNIFICANT CHANGE UP (ref 27–34)
MCHC RBC-ENTMCNC: 31.9 PG — SIGNIFICANT CHANGE UP (ref 27–34)
MCHC RBC-ENTMCNC: 34.3 GM/DL — SIGNIFICANT CHANGE UP (ref 32–36)
MCHC RBC-ENTMCNC: 34.6 GM/DL — SIGNIFICANT CHANGE UP (ref 32–36)
MCV RBC AUTO: 92.1 FL — SIGNIFICANT CHANGE UP (ref 80–100)
MCV RBC AUTO: 93 FL — SIGNIFICANT CHANGE UP (ref 80–100)
PHOSPHATE SERPL-MCNC: 2.4 MG/DL — LOW (ref 2.5–4.5)
PHOSPHATE SERPL-MCNC: 2.6 MG/DL — SIGNIFICANT CHANGE UP (ref 2.5–4.5)
PLATELET # BLD AUTO: 273 K/UL — SIGNIFICANT CHANGE UP (ref 150–400)
PLATELET # BLD AUTO: 292 K/UL — SIGNIFICANT CHANGE UP (ref 150–400)
POTASSIUM SERPL-MCNC: 4.3 MMOL/L — SIGNIFICANT CHANGE UP (ref 3.5–5.3)
POTASSIUM SERPL-MCNC: 4.4 MMOL/L — SIGNIFICANT CHANGE UP (ref 3.5–5.3)
POTASSIUM SERPL-SCNC: 4.3 MMOL/L — SIGNIFICANT CHANGE UP (ref 3.5–5.3)
POTASSIUM SERPL-SCNC: 4.4 MMOL/L — SIGNIFICANT CHANGE UP (ref 3.5–5.3)
PROT SERPL-MCNC: 4.3 G/DL — LOW (ref 6–8.3)
PROTHROM AB SERPL-ACNC: 16 SEC — HIGH (ref 10–12.9)
RBC # BLD: 2.57 M/UL — LOW (ref 3.8–5.2)
RBC # BLD: 2.67 M/UL — LOW (ref 3.8–5.2)
RBC # FLD: 15.1 % — HIGH (ref 10.3–14.5)
RBC # FLD: 15.3 % — HIGH (ref 10.3–14.5)
SODIUM SERPL-SCNC: 127 MMOL/L — LOW (ref 135–145)
SODIUM SERPL-SCNC: 130 MMOL/L — LOW (ref 135–145)
VANCOMYCIN TROUGH SERPL-MCNC: 20.7 UG/ML — HIGH (ref 10–20)
WBC # BLD: 16.2 K/UL — HIGH (ref 3.8–10.5)
WBC # BLD: 16.3 K/UL — HIGH (ref 3.8–10.5)
WBC # FLD AUTO: 16.2 K/UL — HIGH (ref 3.8–10.5)
WBC # FLD AUTO: 16.3 K/UL — HIGH (ref 3.8–10.5)

## 2019-05-27 PROCEDURE — 99291 CRITICAL CARE FIRST HOUR: CPT

## 2019-05-27 PROCEDURE — 76604 US EXAM CHEST: CPT | Mod: 26,GC

## 2019-05-27 PROCEDURE — 93308 TTE F-UP OR LMTD: CPT | Mod: 26,GC

## 2019-05-27 PROCEDURE — 99233 SBSQ HOSP IP/OBS HIGH 50: CPT

## 2019-05-27 PROCEDURE — 93010 ELECTROCARDIOGRAM REPORT: CPT

## 2019-05-27 RX ORDER — SODIUM CHLORIDE 9 MG/ML
125 INJECTION INTRAMUSCULAR; INTRAVENOUS; SUBCUTANEOUS ONCE
Refills: 0 | Status: COMPLETED | OUTPATIENT
Start: 2019-05-27 | End: 2019-05-27

## 2019-05-27 RX ORDER — SODIUM CHLORIDE 9 MG/ML
50 INJECTION INTRAMUSCULAR; INTRAVENOUS; SUBCUTANEOUS ONCE
Refills: 0 | Status: DISCONTINUED | OUTPATIENT
Start: 2019-05-27 | End: 2019-05-27

## 2019-05-27 RX ORDER — MIDODRINE HYDROCHLORIDE 2.5 MG/1
10 TABLET ORAL EVERY 8 HOURS
Refills: 0 | Status: DISCONTINUED | OUTPATIENT
Start: 2019-05-27 | End: 2019-05-31

## 2019-05-27 RX ORDER — BUDESONIDE, MICRONIZED 100 %
0.5 POWDER (GRAM) MISCELLANEOUS EVERY 12 HOURS
Refills: 0 | Status: DISCONTINUED | OUTPATIENT
Start: 2019-05-27 | End: 2019-05-31

## 2019-05-27 RX ORDER — COLLAGENASE CLOSTRIDIUM HIST. 250 UNIT/G
1 OINTMENT (GRAM) TOPICAL DAILY
Refills: 0 | Status: DISCONTINUED | OUTPATIENT
Start: 2019-05-27 | End: 2019-05-31

## 2019-05-27 RX ORDER — METRONIDAZOLE 500 MG
500 TABLET ORAL ONCE
Refills: 0 | Status: COMPLETED | OUTPATIENT
Start: 2019-05-27 | End: 2019-05-27

## 2019-05-27 RX ORDER — METRONIDAZOLE 500 MG
500 TABLET ORAL EVERY 8 HOURS
Refills: 0 | Status: DISCONTINUED | OUTPATIENT
Start: 2019-05-27 | End: 2019-05-28

## 2019-05-27 RX ORDER — HYDROCORTISONE 20 MG
100 TABLET ORAL EVERY 8 HOURS
Refills: 0 | Status: DISCONTINUED | OUTPATIENT
Start: 2019-05-27 | End: 2019-05-28

## 2019-05-27 RX ORDER — HYDROCORTISONE 20 MG
100 TABLET ORAL EVERY 8 HOURS
Refills: 0 | Status: DISCONTINUED | OUTPATIENT
Start: 2019-05-27 | End: 2019-05-27

## 2019-05-27 RX ORDER — ALBUTEROL 90 UG/1
2.5 AEROSOL, METERED ORAL EVERY 6 HOURS
Refills: 0 | Status: DISCONTINUED | OUTPATIENT
Start: 2019-05-27 | End: 2019-05-31

## 2019-05-27 RX ORDER — MIDODRINE HYDROCHLORIDE 2.5 MG/1
10 TABLET ORAL ONCE
Refills: 0 | Status: DISCONTINUED | OUTPATIENT
Start: 2019-05-27 | End: 2019-05-27

## 2019-05-27 RX ORDER — CHLORHEXIDINE GLUCONATE 213 G/1000ML
1 SOLUTION TOPICAL
Refills: 0 | Status: DISCONTINUED | OUTPATIENT
Start: 2019-05-27 | End: 2019-05-31

## 2019-05-27 RX ORDER — QUETIAPINE FUMARATE 200 MG/1
25 TABLET, FILM COATED ORAL AT BEDTIME
Refills: 0 | Status: DISCONTINUED | OUTPATIENT
Start: 2019-05-27 | End: 2019-05-31

## 2019-05-27 RX ORDER — MIDODRINE HYDROCHLORIDE 2.5 MG/1
10 TABLET ORAL ONCE
Refills: 0 | Status: COMPLETED | OUTPATIENT
Start: 2019-05-27 | End: 2019-05-27

## 2019-05-27 RX ORDER — LACTOBACILLUS ACIDOPHILUS 100MM CELL
1 CAPSULE ORAL DAILY
Refills: 0 | Status: DISCONTINUED | OUTPATIENT
Start: 2019-05-27 | End: 2019-05-31

## 2019-05-27 RX ORDER — FAMOTIDINE 10 MG/ML
20 INJECTION INTRAVENOUS DAILY
Refills: 0 | Status: DISCONTINUED | OUTPATIENT
Start: 2019-05-27 | End: 2019-05-31

## 2019-05-27 RX ORDER — SODIUM CHLORIDE 9 MG/ML
500 INJECTION INTRAMUSCULAR; INTRAVENOUS; SUBCUTANEOUS ONCE
Refills: 0 | Status: COMPLETED | OUTPATIENT
Start: 2019-05-27 | End: 2019-05-27

## 2019-05-27 RX ORDER — ASCORBIC ACID 60 MG
500 TABLET,CHEWABLE ORAL DAILY
Refills: 0 | Status: DISCONTINUED | OUTPATIENT
Start: 2019-05-27 | End: 2019-05-31

## 2019-05-27 RX ORDER — MIDODRINE HYDROCHLORIDE 2.5 MG/1
10 TABLET ORAL EVERY 8 HOURS
Refills: 0 | Status: DISCONTINUED | OUTPATIENT
Start: 2019-05-27 | End: 2019-05-27

## 2019-05-27 RX ORDER — ACETAMINOPHEN 500 MG
600 TABLET ORAL ONCE
Refills: 0 | Status: COMPLETED | OUTPATIENT
Start: 2019-05-27 | End: 2019-05-27

## 2019-05-27 RX ORDER — NOREPINEPHRINE BITARTRATE/D5W 8 MG/250ML
0.05 PLASTIC BAG, INJECTION (ML) INTRAVENOUS
Qty: 16 | Refills: 0 | Status: DISCONTINUED | OUTPATIENT
Start: 2019-05-27 | End: 2019-05-28

## 2019-05-27 RX ORDER — DIPHENOXYLATE HCL/ATROPINE 2.5-.025MG
1 TABLET ORAL DAILY
Refills: 0 | Status: DISCONTINUED | OUTPATIENT
Start: 2019-05-27 | End: 2019-05-31

## 2019-05-27 RX ORDER — MAGNESIUM SULFATE 500 MG/ML
2 VIAL (ML) INJECTION ONCE
Refills: 0 | Status: COMPLETED | OUTPATIENT
Start: 2019-05-27 | End: 2019-05-27

## 2019-05-27 RX ORDER — SODIUM CHLORIDE 9 MG/ML
1000 INJECTION INTRAMUSCULAR; INTRAVENOUS; SUBCUTANEOUS ONCE
Refills: 0 | Status: COMPLETED | OUTPATIENT
Start: 2019-05-27 | End: 2019-05-27

## 2019-05-27 RX ORDER — CLONAZEPAM 1 MG
1.5 TABLET ORAL AT BEDTIME
Refills: 0 | Status: DISCONTINUED | OUTPATIENT
Start: 2019-05-27 | End: 2019-05-31

## 2019-05-27 RX ORDER — SODIUM CHLORIDE 9 MG/ML
250 INJECTION INTRAMUSCULAR; INTRAVENOUS; SUBCUTANEOUS ONCE
Refills: 0 | Status: COMPLETED | OUTPATIENT
Start: 2019-05-27 | End: 2019-05-27

## 2019-05-27 RX ORDER — CHLORHEXIDINE GLUCONATE 213 G/1000ML
1 SOLUTION TOPICAL DAILY
Refills: 0 | Status: DISCONTINUED | OUTPATIENT
Start: 2019-05-27 | End: 2019-05-31

## 2019-05-27 RX ADMIN — Medication 100 MILLIGRAM(S): at 21:00

## 2019-05-27 RX ADMIN — ALBUTEROL 2.5 MILLIGRAM(S): 90 AEROSOL, METERED ORAL at 23:31

## 2019-05-27 RX ADMIN — Medication 0.5 MILLIGRAM(S): at 17:52

## 2019-05-27 RX ADMIN — Medication 1 APPLICATION(S): at 13:13

## 2019-05-27 RX ADMIN — MIDODRINE HYDROCHLORIDE 10 MILLIGRAM(S): 2.5 TABLET ORAL at 13:12

## 2019-05-27 RX ADMIN — MIDODRINE HYDROCHLORIDE 10 MILLIGRAM(S): 2.5 TABLET ORAL at 21:00

## 2019-05-27 RX ADMIN — ALBUTEROL 2.5 MILLIGRAM(S): 90 AEROSOL, METERED ORAL at 17:56

## 2019-05-27 RX ADMIN — CEFEPIME 100 MILLIGRAM(S): 1 INJECTION, POWDER, FOR SOLUTION INTRAMUSCULAR; INTRAVENOUS at 05:58

## 2019-05-27 RX ADMIN — APIXABAN 2.5 MILLIGRAM(S): 2.5 TABLET, FILM COATED ORAL at 21:00

## 2019-05-27 RX ADMIN — Medication 100 MILLIGRAM(S): at 13:12

## 2019-05-27 RX ADMIN — Medication 1 TABLET(S): at 13:10

## 2019-05-27 RX ADMIN — ALBUTEROL 2.5 MILLIGRAM(S): 90 AEROSOL, METERED ORAL at 11:34

## 2019-05-27 RX ADMIN — TIZANIDINE 4 MILLIGRAM(S): 4 TABLET ORAL at 21:01

## 2019-05-27 RX ADMIN — APIXABAN 2.5 MILLIGRAM(S): 2.5 TABLET, FILM COATED ORAL at 08:28

## 2019-05-27 RX ADMIN — Medication 500 MILLIGRAM(S): at 13:10

## 2019-05-27 RX ADMIN — Medication 100 MILLIGRAM(S): at 01:40

## 2019-05-27 RX ADMIN — Medication 62.5 MILLIGRAM(S): at 00:41

## 2019-05-27 RX ADMIN — SODIUM CHLORIDE 1000 MILLILITER(S): 9 INJECTION INTRAMUSCULAR; INTRAVENOUS; SUBCUTANEOUS at 03:00

## 2019-05-27 RX ADMIN — CHLORHEXIDINE GLUCONATE 1 APPLICATION(S): 213 SOLUTION TOPICAL at 08:28

## 2019-05-27 RX ADMIN — SODIUM CHLORIDE 1000 MILLILITER(S): 9 INJECTION INTRAMUSCULAR; INTRAVENOUS; SUBCUTANEOUS at 01:41

## 2019-05-27 RX ADMIN — Medication 1 TABLET(S): at 13:17

## 2019-05-27 RX ADMIN — CEFEPIME 100 MILLIGRAM(S): 1 INJECTION, POWDER, FOR SOLUTION INTRAMUSCULAR; INTRAVENOUS at 18:38

## 2019-05-27 RX ADMIN — Medication 100 MILLIGRAM(S): at 13:17

## 2019-05-27 RX ADMIN — TIZANIDINE 4 MILLIGRAM(S): 4 TABLET ORAL at 08:30

## 2019-05-27 RX ADMIN — Medication 100 MILLIGRAM(S): at 21:01

## 2019-05-27 RX ADMIN — FAMOTIDINE 20 MILLIGRAM(S): 10 INJECTION INTRAVENOUS at 13:12

## 2019-05-27 RX ADMIN — SODIUM CHLORIDE 75 MILLILITER(S): 9 INJECTION INTRAMUSCULAR; INTRAVENOUS; SUBCUTANEOUS at 21:00

## 2019-05-27 RX ADMIN — MIDODRINE HYDROCHLORIDE 10 MILLIGRAM(S): 2.5 TABLET ORAL at 03:04

## 2019-05-27 RX ADMIN — SODIUM CHLORIDE 1000 MILLILITER(S): 9 INJECTION INTRAMUSCULAR; INTRAVENOUS; SUBCUTANEOUS at 10:43

## 2019-05-27 RX ADMIN — SODIUM CHLORIDE 250 MILLILITER(S): 9 INJECTION INTRAMUSCULAR; INTRAVENOUS; SUBCUTANEOUS at 05:59

## 2019-05-27 RX ADMIN — Medication 100 GRAM(S): at 06:52

## 2019-05-27 RX ADMIN — Medication 0.5 MILLIGRAM(S): at 05:56

## 2019-05-27 NOTE — PROGRESS NOTE ADULT - SUBJECTIVE AND OBJECTIVE BOX
MICU accept/transfer note    INTERVAL HPI/OVERNIGHT EVENTS: Pt persistently hypotensive w/ SBP in the 70s despite IVF, transferred to MICU for further care.     SUBJECTIVE: Pt non-verbal at baseline. Unable to obtain ROS. Seen and examined.     OBJECTIVE:    VITAL SIGNS:  ICU Vital Signs Last 24 Hrs  T(C): 36.8 (27 May 2019 05:04), Max: 38.4 (26 May 2019 20:31)  T(F): 98.3 (27 May 2019 05:04), Max: 101.1 (26 May 2019 20:31)  HR: 88 (27 May 2019 05:04) (84 - 125)  BP: 82/56 (27 May 2019 05:13) (72/39 - 110/77)  RR: 18 (27 May 2019 05:04) (16 - 20)  SpO2: 96% (27 May 2019 05:04) (93% - 96%)        05-25 @ 07:01  -  05-26 @ 07:00  --------------------------------------------------------  IN: 2110 mL / OUT: 425 mL / NET: 1685 mL    05-26 @ 07:01  -  05-27 @ 06:28  --------------------------------------------------------  IN: 2020 mL / OUT: 900 mL / NET: 1120 mL      CAPILLARY BLOOD GLUCOSE      POCT Blood Glucose.: 175 mg/dL (27 May 2019 00:58)      PHYSICAL EXAM:    GENERAL APPEARANCE: NAD, laying comfortably  HEENT:  NC/AT, clear conjunctiva  NECK: Neck supple, non-tender without lymphadenopathy, masses  CARDIAC: Normal S1 and S2, tachy  LUNGS: Clear to auscultation and percussion without rales, rhonchi, wheezing or diminished breath sounds anteriorly   ABDOMEN: Soft, nondistended, nontender. No guarding or rebound. +PEG  MUSKULOSKELETAL: No joint erythema or tenderness.   EXTREMITIES: Trace LE edema   NEUROLOGICAL: +contracted  SKIN: +chronic ulcers  PSYCHIATRIC: AOx0, non-verbal.      MEDICATIONS:  MEDICATIONS  (STANDING):  ALBUTerol    0.083% 2.5 milliGRAM(s) Nebulizer every 6 hours  ALBUTerol    90 MICROgram(s) HFA Inhaler 1 Puff(s) Inhalation every 4 hours  apixaban 2.5 milliGRAM(s) Oral every 12 hours  ascorbic acid 500 milliGRAM(s) Oral daily  Awake Gel Capsules 20:1 (THC:CBD) 2 Capsule(s) 2 Capsule(s) Oral <User Schedule>  buDESOnide   0.5 milliGRAM(s) Respule 0.5 milliGRAM(s) Inhalation every 12 hours  Calm Drops 50:1 (THC:CBD) 2 milliLiter(s) 2 milliLiter(s) Oral <User Schedule>  cefepime   IVPB 1000 milliGRAM(s) IV Intermittent every 12 hours  chlorhexidine 2% Cloths 1 Application(s) Topical daily  chlorhexidine 4% Liquid 1 Application(s) Topical <User Schedule>  collagenase Ointment 1 Application(s) Topical daily  diphenoxylate/atropine 1 Tablet(s) Oral daily  famotidine   Suspension 20 milliGRAM(s) Oral daily  [place] Drops 1:1 THC:CBD 3 milliLiter(s) 3 milliLiter(s) Oral <User Schedule>  hydrocortisone sodium succinate Injectable 100 milliGRAM(s) IV Push every 8 hours  magnesium sulfate  IVPB 2 Gram(s) IV Intermittent once  midodrine 10 milliGRAM(s) Oral every 8 hours  midodrine 10 milliGRAM(s) Oral once  multivitamin 1 Tablet(s) Oral daily  sodium chloride 0.9%. 1000 milliLiter(s) (75 mL/Hr) IV Continuous <Continuous>  tiZANidine 4 milliGRAM(s) Oral <User Schedule>  vancomycin  IVPB 750 milliGRAM(s) IV Intermittent every 12 hours    MEDICATIONS  (PRN):      ALLERGIES:  Allergies    ASA; dye contrast (Anaphylaxis)  aspirin (Short breath)  divalproex sodium (Other (Unknown))  Flowers and Plants (Short breath)  Haldol (Other (Unknown))  penicillin (Short breath; Rash)  sulfa drugs (Short breath; Rash)  vancomycin (Rash; Urticaria; Hives)  Xanax (Other (Unknown))    Intolerances        LABS:                        8.5    16.2  )-----------( 273      ( 27 May 2019 04:29 )             24.8     05-27    130<L>  |  98  |  29<H>  ----------------------------<  198<H>  4.3   |  20<L>  |  0.34<L>    Ca    7.7<L>      27 May 2019 04:29  Phos  2.6     05-27  Mg     1.5     05-27    TPro  4.3<L>  /  Alb  2.0<L>  /  TBili  0.2  /  DBili  x   /  AST  18  /  ALT  13  /  AlkPhos  63  05-27    PT/INR - ( 27 May 2019 01:25 )   PT: 16.0 sec;   INR: 1.38 ratio         PTT - ( 27 May 2019 01:25 )  PTT:27.6 sec      RADIOLOGY & ADDITIONAL TESTS: Reviewed.

## 2019-05-27 NOTE — PROGRESS NOTE ADULT - SUBJECTIVE AND OBJECTIVE BOX
---___---___---___---___---___---___ ---___---___---___---___---___---___---___---___---                  M E D I C A L   A T T E N D I N G   P R O G R E S S   N O T E  ---___---___---___---___---___---___ ---___---___---___---___---___---___---___---___---        ================================================    ++CHIEF COMPLAINT:   Patient is a 69y old  Female who presents with a chief complaint of pneumonia (27 May 2019 06:28)      Pneumonia had  hypotensive episode last night transferred to micu  and is now stabilized     ---___---___---___---___---___---  PAST MEDICAL / Surgical  HISTORY:  PAST MEDICAL & SURGICAL HISTORY:  CVA (cerebral vascular accident)  Fatty pancreas  PNA (pneumonia)  Pulmonary HTN  IGT (impaired glucose tolerance)  Ulcerative colitis  Acid reflux  Anxiety  Depression  Mouth sores  HLD (hyperlipidemia)  Asthma  Humeral head fracture  H/O: hysterectomy  H/O cataract extraction, left  History of knee replacement      ---___---___---___---___---___---  FAMILY HISTORY:   FAMILY HISTORY:  Family history of dementia (Grandparent)  Family history of colon cancer (Grandparent)        ---___---___---___---___---___---  ALLERGIES:   Allergies    ASA; dye contrast (Anaphylaxis)  aspirin (Short breath)  divalproex sodium (Other (Unknown))  Flowers and Plants (Short breath)  Haldol (Other (Unknown))  penicillin (Short breath; Rash)  sulfa drugs (Short breath; Rash)  vancomycin (Rash; Urticaria; Hives)  Xanax (Other (Unknown))    Intolerances        ---___---___---___---___---___---  MEDICATIONS:  MEDICATIONS  (STANDING):  ALBUTerol    0.083% 2.5 milliGRAM(s) Nebulizer every 6 hours  ALBUTerol    90 MICROgram(s) HFA Inhaler 1 Puff(s) Inhalation every 4 hours  apixaban 2.5 milliGRAM(s) Oral every 12 hours  ascorbic acid 500 milliGRAM(s) Oral daily  Awake Gel Capsules 20:1 (THC:CBD) 2 Capsule(s) 2 Capsule(s) Oral <User Schedule>  buDESOnide   0.5 milliGRAM(s) Respule 0.5 milliGRAM(s) Inhalation every 12 hours  Calm Drops 50:1 (THC:CBD) 2 milliLiter(s) 2 milliLiter(s) Oral <User Schedule>  cefepime   IVPB 1000 milliGRAM(s) IV Intermittent every 12 hours  chlorhexidine 2% Cloths 1 Application(s) Topical daily  chlorhexidine 4% Liquid 1 Application(s) Topical <User Schedule>  collagenase Ointment 1 Application(s) Topical daily  diphenoxylate/atropine 1 Tablet(s) Oral daily  famotidine   Suspension 20 milliGRAM(s) Oral daily  [place] Drops 1:1 THC:CBD 3 milliLiter(s) 3 milliLiter(s) Oral <User Schedule>  hydrocortisone sodium succinate Injectable 100 milliGRAM(s) IV Push every 8 hours  lactobacillus acidophilus 1 Tablet(s) Oral daily  metroNIDAZOLE  IVPB 500 milliGRAM(s) IV Intermittent every 8 hours  midodrine 10 milliGRAM(s) Oral every 8 hours  multivitamin 1 Tablet(s) Oral daily  norepinephrine Infusion 0.05 MICROgram(s)/kG/Min (1.913 mL/Hr) IV Continuous <Continuous>  sodium chloride 0.9%. 1000 milliLiter(s) (75 mL/Hr) IV Continuous <Continuous>  tiZANidine 4 milliGRAM(s) Oral <User Schedule>  vancomycin  IVPB 750 milliGRAM(s) IV Intermittent every 12 hours    MEDICATIONS  (PRN):      ---___---___---___---___---___---  REVIEW OF SYSTEM:    unable to obtain     ---___---___---___---___---___---  VITAL SIGNS:  69y , CAPILLARY BLOOD GLUCOSE      POCT Blood Glucose.: 175 mg/dL (27 May 2019 00:58)    T(C): 36.3 (19 @ 06:42), Max: 38.4 (19 @ 20:31)  HR: 92 (19 @ 06:42) (84 - 125)  BP: 117/67 (19 @ 06:42) (72/39 - 117/67)  RR: 22 (19 @ 06:42) (18 - 22)  SpO2: 100% (19 @ 06:42) (93% - 100%)  ---___---___---___---___---___---  PHYSICAL EXAM:    GEN: A&O X 0 , NAD , comfortable  HEENT: NCAT, PERRL, MMM, hearing intact  Neck: supple , no JVD  CVS: S1S2 , regular , No M/R/G appreciated  PULM: decreased breath sounds noted   ABD.: soft. non tender, non distended,  bowel sounds present peg present   Extrem: intact pulses , no edema   Derm: sacral decubitus noted         ---___---___---___---___---___---            LAB AND IMAGIN.5    16.2  )-----------( 273      ( 27 May 2019 04:29 )             24.8                   130<L>  |  98  |  29<H>  ----------------------------<  198<H>  4.3   |  20<L>  |  0.34<L>    Ca    7.7<L>      27 May 2019 04:29  Phos  2.6       Mg     1.5         TPro  4.3<L>  /  Alb  2.0<L>  /  TBili  0.2  /  DBili  x   /  AST  18  /  ALT  13  /  AlkPhos  63      PT/INR - ( 27 May 2019 01:25 )   PT: 16.0 sec;   INR: 1.38 ratio         PTT - ( 27 May 2019 01:25 )  PTT:27.6 sec                      ABG - ( 27 May 2019 01:18 )  pH, Arterial: 7.50  pH, Blood: x     /  pCO2: 30    /  pO2: 111   / HCO3: 24    / Base Excess: 1.1   /  SaO2: 100                   [All pertinent / recent Imaging reviewed]         ---___---___---___---___---___---___ ---___---___---___---___---                         A S S E S S M E N T   A N D   P L A N :      HEALTH ISSUES - PROBLEM Dx:  hypotension  on prednisone   sepsis continue iv antibiotics   h/o dvt on eliquis   chronic pain on medical marijuana  anxiety on seroquel and klonopin at night  asthma on chronic  steroids and budesonide   sacral decubitus stage 4 contiuenu treatment              -GI/DVT Prophylaxis.    --------------------------------------------  Case discussed with   Education given on   ___________________________  Thank you,  Deniz Bolden  [phone]

## 2019-05-27 NOTE — CHART NOTE - NSCHARTNOTEFT_GEN_A_CORE
: Beatriz Chanel MD     INDICATION: Shock of unclear etiology    PROCEDURE:  [x ] LIMITED ECHO  [x ] LIMITED CHEST  [ ] LIMITED RETROPERITONEAL  [ ] LIMITED ABDOMINAL  [ ] LIMITED DVT  [ ] NEEDLE GUIDANCE VASCULAR  [ ] NEEDLE GUIDANCE THORACENTESIS  [ ] NEEDLE GUIDANCE PARACENTESIS  [ ] NEEDLE GUIDANCE PERICARDIOCENTESIS  [ ] OTHER    FINDINGS:  Lungs: A-line predominant pattern bilaterally.  Cardiac: No pericardial effusion. RV<LV. LV is hyperdynamic. IVC flat with minimal respiratory variation and RV collapsible.    INTERPRETATION: Normal lung aeration pattern. Pt hypovolemic, will fluid resuscitate. : Beatriz Chanel MD     INDICATION: Shock of unclear etiology    PROCEDURE:  [x ] LIMITED ECHO  [x ] LIMITED CHEST  [ ] LIMITED RETROPERITONEAL  [ ] LIMITED ABDOMINAL  [ ] LIMITED DVT  [ ] NEEDLE GUIDANCE VASCULAR  [ ] NEEDLE GUIDANCE THORACENTESIS  [ ] NEEDLE GUIDANCE PARACENTESIS  [ ] NEEDLE GUIDANCE PERICARDIOCENTESIS  [ ] OTHER    FINDINGS:  Lungs: A-line predominant pattern bilaterally.  Cardiac: No pericardial effusion. RV<LV. LV is hyperdynamic. IVC flat with minimal respiratory variation and RV collapsible.    INTERPRETATION: Normal lung aeration pattern. Pt hypovolemic hypotension, will fluid resuscitate.

## 2019-05-27 NOTE — CONSULT NOTE ADULT - ASSESSMENT
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 aspiration PNA. Pt's hypotension is responding to IVF bolus and stress steroids - does not require ICU level of care at this time.    #Neuro  -unable to assess, baseline severely contracted and non-verbal, unable to follow commands    #Resp  -POCUS w/ MAURA posterior consolidation concerning for PNA. Given aspiration risk, would add on flagyl for anaerobic coverage given multiple abx allergies. c/w vanco/cefepime. ID f/u in AM to determine best abx combination.    #CVS  -BP responding to IVF. Scattered B-lines on POCUS, does not appear to have gross pulmonary edema. s/p midodrine 10mg, c/w q8h and wean as tolerated. Can administer additional 500c IVF if pt becomes hypotensive again.  -Hold anti-hypertensives for now  -A/c per primary team for afib    #GI  -No active issues  -c/w tube feeds    #Renal  -Cr at baseline, monitor urine output while on IVF.   -Hyponatremia w/ multiple other electrolyte abnormalities compared to previous, may be poor blood sample. Consider repeat BMP.    #Heme  -Leukocytosis likely in setting of infection. abx as below.     #Endo  -Pt reportedly has been on chronic prednisone 7.5mg for ?asthma. c/w stress dose solu-cortef 100mg q8h.    #ID  -C/w vanco/cefepime. Repeat blood cx sent. Coag neg staph may be contaminant, but if persistently present may need repeat TTE to r/o endocarditis  -c/w flagyl for now for anaerobic coverage. ID f/u in AM    #Ethics  -Full code

## 2019-05-27 NOTE — PROGRESS NOTE ADULT - SUBJECTIVE AND OBJECTIVE BOX
CC: F/U for Hypotension    Saw/spoke to patient. Fever. Patient had RRT for low blood pressure. Now transferred to MICU. Appears at baseline in MICU. Blood pressure restabilized. No other focal symptoms.     ANTIMICROBIALS:  cefepime   IVPB 1000 every 12 hours  metroNIDAZOLE  IVPB 500 every 8 hours  vancomycin  IVPB 750 every 12 hours    PE:    Vital Signs Last 24 Hrs  T(C): 36.3 (27 May 2019 06:42), Max: 38.4 (26 May 2019 20:31)  T(F): 97.4 (27 May 2019 06:42), Max: 101.1 (26 May 2019 20:31)  HR: 92 (27 May 2019 06:42) (84 - 125)  BP: 117/67 (27 May 2019 06:42) (72/39 - 117/67)  BP(mean): 84 (27 May 2019 06:42) (84 - 84)  RR: 22 (27 May 2019 06:42) (18 - 22)  SpO2: 100% (27 May 2019 06:42) (93% - 100%)    Gen: AOx0, nonverbal, contracted  CV: S1+S2 normal, nontachycardic  Resp: Clear bilat, no resp distress, no crackles/wheezes  Abd: Soft, nontender, +BS, PEG  Ext: No LE edema, no wounds    LABS:                        8.5    16.2  )-----------( 273      ( 27 May 2019 04:29 )             24.8     05-27    130<L>  |  98  |  29<H>  ----------------------------<  198<H>  4.3   |  20<L>  |  0.34<L>    Ca    7.7<L>      27 May 2019 04:29  Phos  2.6     05-27  Mg     1.5     05-27    TPro  4.3<L>  /  Alb  2.0<L>  /  TBili  0.2  /  DBili  x   /  AST  18  /  ALT  13  /  AlkPhos  63  05-27    MICROBIOLOGY:  Vancomycin Level, Trough: 20.7 ug/mL (05-27-19 @ 00:34)    .Urine  05-24-19   >=3 organisms. Probable collection contamination.     .Blood  05-24-19   Growth in anaerobic bottle: Coag Negative Staphylococcus    .Blood  04-28-19   No growth at 5 days.  --    Growth in aerobic bottle: Gram Positive Cocci in Clusters    Clostridium difficile [facility], EIA:   Negative (05-26-19 @ 00:02)    RADIOLOGY:    5/24 XR:    IMPRESSION:    1.  Question left lower lung opacity.  2.  Right middle lung atelectasis.

## 2019-05-27 NOTE — CONSULT NOTE ADULT - SUBJECTIVE AND OBJECTIVE BOX
CHIEF COMPLAINT: fevers    HPI:    69 F w/ advanced dementia with severe contractures, large decubitus ulcer,  chronic mrsa infection of left hip with a spacer that can not be removed admitted for fever work-up. Pt has been treated with multiple courses of fever to suppress mrsa infection but now with recurrent fever despite being on vanco/cefepime. RRT called for hypotension to the 70s that responded to IVF w/ improvement in SBP to the 90s. Pt also found to have been on chronic prednisone for asthma per  and given stress dose solucortef 100mg q8h. MICU consulted for sepsis.     PAST MEDICAL & SURGICAL HISTORY:  CVA (cerebral vascular accident)  Fatty pancreas  PNA (pneumonia)  Pulmonary HTN  IGT (impaired glucose tolerance)  Ulcerative colitis  Acid reflux  Anxiety  Depression  Mouth sores  HLD (hyperlipidemia)  Asthma  Humeral head fracture  H/O: hysterectomy  H/O cataract extraction, left  History of knee replacement      FAMILY HISTORY:  Family history of dementia (Grandparent)  Family history of colon cancer (Grandparent)      SOCIAL HISTORY:  Unable to obtain given mental status     Allergies    ASA; dye contrast (Anaphylaxis)  aspirin (Short breath)  divalproex sodium (Other (Unknown))  Flowers and Plants (Short breath)  Haldol (Other (Unknown))  penicillin (Short breath; Rash)  sulfa drugs (Short breath; Rash)  vancomycin (Rash; Urticaria; Hives)  Xanax (Other (Unknown))    Intolerances        HOME MEDICATIONS:    REVIEW OF SYSTEMS:  Constitutional:   Eyes:  ENT:  CV:  Resp:  GI:  :  MSK:  Integumentary:  Neurological:  Psychiatric:  Endocrine:  Hematologic/Lymphatic:  Allergic/Immunologic:  [ ] All other systems negative  [x ] Unable to assess ROS because pt non-verbal at baseline    OBJECTIVE:  ICU Vital Signs Last 24 Hrs  T(C): 36.6 (27 May 2019 00:31), Max: 38.4 (26 May 2019 20:31)  T(F): 97.9 (27 May 2019 00:31), Max: 101.1 (26 May 2019 20:31)  HR: 125 (27 May 2019 00:31) (84 - 125)  BP: 80/52 (27 May 2019 00:31) (80/52 - 110/77)  RR: 18 (27 May 2019 00:31) (16 - 20)  SpO2: 96% (27 May 2019 00:31) (93% - 96%)        05-25 @ 07:01 - 05-26 @ 07:00  --------------------------------------------------------  IN: 2110 mL / OUT: 425 mL / NET: 1685 mL    05-26 @ 07:01 - 05-27 @ 02:27  --------------------------------------------------------  IN: 2020 mL / OUT: 650 mL / NET: 1370 mL      CAPILLARY BLOOD GLUCOSE      POCT Blood Glucose.: 175 mg/dL (27 May 2019 00:58)      PHYSICAL EXAM:  GENERAL APPEARANCE: NAD, laying comfortably  HEENT:  NC/AT, clear conjunctiva  NECK: Neck supple, non-tender without lymphadenopathy, masses  CARDIAC: Normal S1 and S2, tachy  LUNGS: Clear to auscultation and percussion without rales, rhonchi, wheezing or diminished breath sounds anteriorly   ABDOMEN: Soft, nondistended, nontender. No guarding or rebound. +PEG  MUSKULOSKELETAL: No joint erythema or tenderness.   EXTREMITIES: Trace LE edema   NEUROLOGICAL: +contracted  SKIN: +chronic ulcers  PSYCHIATRIC: AOx0, non-verbal.      HOSPITAL MEDICATIONS:  MEDICATIONS  (STANDING):  acetaminophen  IVPB .. 600 milliGRAM(s) IV Intermittent once  ALBUTerol    0.083% 2.5 milliGRAM(s) Nebulizer every 6 hours  ALBUTerol    90 MICROgram(s) HFA Inhaler 1 Puff(s) Inhalation every 4 hours  apixaban 2.5 milliGRAM(s) Oral every 12 hours  ascorbic acid 500 milliGRAM(s) Oral daily  Awake Gel Capsules 20:1 (THC:CBD) 2 Capsule(s) 2 Capsule(s) Oral <User Schedule>  buDESOnide   0.5 milliGRAM(s) Respule 0.5 milliGRAM(s) Inhalation every 12 hours  Calm Drops 50:1 (THC:CBD) 2 milliLiter(s) 2 milliLiter(s) Oral <User Schedule>  cefepime   IVPB 1000 milliGRAM(s) IV Intermittent every 12 hours  clonazePAM  Tablet 1.5 milliGRAM(s) Oral at bedtime  collagenase Ointment 1 Application(s) Topical daily  diphenoxylate/atropine 1 Tablet(s) Oral daily  famotidine   Suspension 20 milliGRAM(s) Oral daily  gabapentin   Solution 300 milliGRAM(s) Oral two times a day  [place] Drops 1:1 THC:CBD 3 milliLiter(s) 3 milliLiter(s) Oral <User Schedule>  hydrocortisone sodium succinate Injectable 100 milliGRAM(s) IV Push every 8 hours  lisinopril 5 milliGRAM(s) Oral daily  metoprolol tartrate 12.5 milliGRAM(s) Oral two times a day  midodrine 10 milliGRAM(s) Oral once  multivitamin 1 Tablet(s) Oral daily  QUEtiapine 25 milliGRAM(s) Oral at bedtime  sodium chloride 0.9%. 1000 milliLiter(s) (75 mL/Hr) IV Continuous <Continuous>  tiZANidine 4 milliGRAM(s) Oral <User Schedule>  vancomycin  IVPB 750 milliGRAM(s) IV Intermittent every 12 hours    MEDICATIONS  (PRN):  acetaminophen    Suspension .. 650 milliGRAM(s) Oral every 6 hours PRN Temp greater or equal to 38C (100.4F), Moderate Pain (4 - 6)  diphenhydrAMINE   Injectable 25 milliGRAM(s) IV Push every 6 hours PRN Rash      LABS:                        8.2    16.3  )-----------( 292      ( 27 May 2019 01:25 )             23.7     05-27    127<L>  |  95<L>  |  33<H>  ----------------------------<  174<H>  4.4   |  21<L>  |  0.38<L>    Ca    7.7<L>      27 May 2019 01:25  Phos  2.4     05-27  Mg     1.5     05-27    TPro  4.3<L>  /  Alb  2.0<L>  /  TBili  0.2  /  DBili  x   /  AST  18  /  ALT  13  /  AlkPhos  63  05-27    PT/INR - ( 27 May 2019 01:25 )   PT: 16.0 sec;   INR: 1.38 ratio         PTT - ( 27 May 2019 01:25 )  PTT:27.6 sec    Arterial Blood Gas:  05-27 @ 01:18  7.50/30/111/24/100/1.1  ABG lactate: --        MICROBIOLOGY: Cultures on 5/24 w/ coag neg staph in 1 bottle, possible contaminant    RADIOLOGY:  [ ] Reviewed and interpreted by me    EKG: sinus tach, RBBB

## 2019-05-27 NOTE — PROGRESS NOTE ADULT - ASSESSMENT
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 aspiration PNA. Initially fluid responsive, but became hypotensive again. Now transferred to MICU for pressor support.     #Neuro  -unable to assess, baseline severely contracted and non-verbal, unable to follow commands    #Resp  -POCUS w/ MAURA posterior consolidation concerning for PNA. Given aspiration risk, would add on flagyl for anaerobic coverage given multiple abx allergies. c/w vanco/cefepime. ID f/u in AM to determine best abx combination.    #CVS  -start pt on levo for pressor support. Reportedly s/p 2L IVF bolus with persistent hypotension. c/w midodrine 10q8h  -Hold anti-hypertensives for now  -c/w a/c for afib    #GI  -No active issues  -c/w tube feeds    #Renal  -Cr at baseline, monitor urine output while on IVF.   -Hyponatremia w/ multiple other electrolyte abnormalities compared to previous, may be poor blood sample. repeat BMP    #Heme  -Leukocytosis likely in setting of infection. abx as below.     #Endo  -Pt reportedly has been on chronic prednisone 7.5mg for ?asthma. c/w stress dose solu-cortef 100mg q8h.    #ID  -C/w vanco/cefepime. Repeat blood cx sent. Coag neg staph may be contaminant, but if persistently present may need repeat TTE to r/o endocarditis  -c/w flagyl for now for anaerobic coverage. ID f/u in AM    #Ethics  -Full code

## 2019-05-27 NOTE — CHART NOTE - NSCHARTNOTEFT_GEN_A_CORE
MAR RRT Note    Time of RRT: 12:53 AM  Reason: Hypotension    68 yo F PMHx hospital discharges with prolonged hospitalization related to fever, advanced dementia (non verbal, w/ severe contractures), large decubitus ulcer, chronic L hip MRSA infection of spacer (treated with courses of vancomycin to suppress fever) who presented with fever in setting of concern of possible aspiration pneumonia vs UTI/sacral wound infection vs DVT vs chronic MRSA infection. Had blood culture with possible contaminant.     RRT called for hypotension to 76/43. She was started on 500 cc bolus with mild improvement to pressure to 80s. Temperature was 100.9 and culture was sent and IV tylenol given. Due to persistent hypotension despite fluids additional fluids for total of 1 L were given. On further review patient had history of chronic prednisone use and stress dose steroids were started. MICU was at bedside during time of RRT evaluation, and further recommended flagyl for additional coverage.     To do:  - Continue stress dosed steroids  - Follow-up BP, if persistently hypotensive despite IVF may need pressors  - Discuss with ID in AM if she needs further coverage, and appropriate choice given extensive allergy list    Amita Mejia MD  Internal Medicine, PGY-3  Pager [phone]

## 2019-05-28 LAB
ALBUMIN SERPL ELPH-MCNC: 2.2 G/DL — LOW (ref 3.3–5)
ALP SERPL-CCNC: 73 U/L — SIGNIFICANT CHANGE UP (ref 40–120)
ALT FLD-CCNC: 12 U/L — SIGNIFICANT CHANGE UP (ref 10–45)
ANION GAP SERPL CALC-SCNC: 12 MMOL/L — SIGNIFICANT CHANGE UP (ref 5–17)
AST SERPL-CCNC: 21 U/L — SIGNIFICANT CHANGE UP (ref 10–40)
BILIRUB SERPL-MCNC: 0.1 MG/DL — LOW (ref 0.2–1.2)
BUN SERPL-MCNC: 24 MG/DL — HIGH (ref 7–23)
CALCIUM SERPL-MCNC: 8.2 MG/DL — LOW (ref 8.4–10.5)
CHLORIDE SERPL-SCNC: 103 MMOL/L — SIGNIFICANT CHANGE UP (ref 96–108)
CO2 SERPL-SCNC: 19 MMOL/L — LOW (ref 22–31)
CREAT SERPL-MCNC: <0.3 MG/DL — LOW (ref 0.5–1.3)
GLUCOSE BLDC GLUCOMTR-MCNC: 116 MG/DL — HIGH (ref 70–99)
GLUCOSE BLDC GLUCOMTR-MCNC: 196 MG/DL — HIGH (ref 70–99)
GLUCOSE BLDC GLUCOMTR-MCNC: 199 MG/DL — HIGH (ref 70–99)
GLUCOSE BLDC GLUCOMTR-MCNC: 66 MG/DL — LOW (ref 70–99)
GLUCOSE BLDC GLUCOMTR-MCNC: 73 MG/DL — SIGNIFICANT CHANGE UP (ref 70–99)
GLUCOSE SERPL-MCNC: 224 MG/DL — HIGH (ref 70–99)
GRAM STN FLD: SIGNIFICANT CHANGE UP
HCT VFR BLD CALC: 22.6 % — LOW (ref 34.5–45)
HGB BLD-MCNC: 7.6 G/DL — LOW (ref 11.5–15.5)
MAGNESIUM SERPL-MCNC: 2 MG/DL — SIGNIFICANT CHANGE UP (ref 1.6–2.6)
MCHC RBC-ENTMCNC: 31.3 PG — SIGNIFICANT CHANGE UP (ref 27–34)
MCHC RBC-ENTMCNC: 33.7 GM/DL — SIGNIFICANT CHANGE UP (ref 32–36)
MCV RBC AUTO: 92.7 FL — SIGNIFICANT CHANGE UP (ref 80–100)
PHOSPHATE SERPL-MCNC: 2.5 MG/DL — SIGNIFICANT CHANGE UP (ref 2.5–4.5)
PLATELET # BLD AUTO: 273 K/UL — SIGNIFICANT CHANGE UP (ref 150–400)
POTASSIUM SERPL-MCNC: 3.7 MMOL/L — SIGNIFICANT CHANGE UP (ref 3.5–5.3)
POTASSIUM SERPL-SCNC: 3.7 MMOL/L — SIGNIFICANT CHANGE UP (ref 3.5–5.3)
PROT SERPL-MCNC: 4.7 G/DL — LOW (ref 6–8.3)
RBC # BLD: 2.44 M/UL — LOW (ref 3.8–5.2)
RBC # FLD: 14.8 % — HIGH (ref 10.3–14.5)
SODIUM SERPL-SCNC: 134 MMOL/L — LOW (ref 135–145)
VANCOMYCIN FLD-MCNC: 16.8 UG/ML — SIGNIFICANT CHANGE UP
WBC # BLD: 12.3 K/UL — HIGH (ref 3.8–10.5)
WBC # FLD AUTO: 12.3 K/UL — HIGH (ref 3.8–10.5)

## 2019-05-28 PROCEDURE — 93010 ELECTROCARDIOGRAM REPORT: CPT

## 2019-05-28 PROCEDURE — 99233 SBSQ HOSP IP/OBS HIGH 50: CPT | Mod: GC

## 2019-05-28 PROCEDURE — 99232 SBSQ HOSP IP/OBS MODERATE 35: CPT

## 2019-05-28 RX ORDER — HYDROCORTISONE 20 MG
100 TABLET ORAL EVERY 8 HOURS
Refills: 0 | Status: COMPLETED | OUTPATIENT
Start: 2019-05-28 | End: 2019-05-28

## 2019-05-28 RX ORDER — GABAPENTIN 400 MG/1
300 CAPSULE ORAL
Refills: 0 | Status: DISCONTINUED | OUTPATIENT
Start: 2019-05-28 | End: 2019-05-31

## 2019-05-28 RX ORDER — GABAPENTIN 400 MG/1
300 CAPSULE ORAL ONCE
Refills: 0 | Status: DISCONTINUED | OUTPATIENT
Start: 2019-05-28 | End: 2019-05-28

## 2019-05-28 RX ORDER — CLONAZEPAM 1 MG
0.5 TABLET ORAL THREE TIMES A DAY
Refills: 0 | Status: DISCONTINUED | OUTPATIENT
Start: 2019-05-28 | End: 2019-05-28

## 2019-05-28 RX ORDER — VANCOMYCIN HCL 1 G
750 VIAL (EA) INTRAVENOUS ONCE
Refills: 0 | Status: COMPLETED | OUTPATIENT
Start: 2019-05-28 | End: 2019-05-28

## 2019-05-28 RX ORDER — VANCOMYCIN HCL 1 G
750 VIAL (EA) INTRAVENOUS EVERY 12 HOURS
Refills: 0 | Status: DISCONTINUED | OUTPATIENT
Start: 2019-05-28 | End: 2019-05-29

## 2019-05-28 RX ORDER — HYDROCORTISONE 20 MG
50 TABLET ORAL EVERY 8 HOURS
Refills: 0 | Status: DISCONTINUED | OUTPATIENT
Start: 2019-05-29 | End: 2019-05-31

## 2019-05-28 RX ORDER — GABAPENTIN 400 MG/1
300 CAPSULE ORAL ONCE
Refills: 0 | Status: COMPLETED | OUTPATIENT
Start: 2019-05-28 | End: 2019-05-28

## 2019-05-28 RX ORDER — VANCOMYCIN HCL 1 G
VIAL (EA) INTRAVENOUS
Refills: 0 | Status: DISCONTINUED | OUTPATIENT
Start: 2019-05-28 | End: 2019-05-29

## 2019-05-28 RX ORDER — APIXABAN 2.5 MG/1
5 TABLET, FILM COATED ORAL EVERY 12 HOURS
Refills: 0 | Status: DISCONTINUED | OUTPATIENT
Start: 2019-05-28 | End: 2019-05-31

## 2019-05-28 RX ADMIN — Medication 0.5 MILLIGRAM(S): at 05:10

## 2019-05-28 RX ADMIN — ALBUTEROL 2.5 MILLIGRAM(S): 90 AEROSOL, METERED ORAL at 05:09

## 2019-05-28 RX ADMIN — Medication 62.5 MILLIGRAM(S): at 21:37

## 2019-05-28 RX ADMIN — SODIUM CHLORIDE 75 MILLILITER(S): 9 INJECTION INTRAMUSCULAR; INTRAVENOUS; SUBCUTANEOUS at 05:12

## 2019-05-28 RX ADMIN — Medication 100 MILLIGRAM(S): at 05:12

## 2019-05-28 RX ADMIN — Medication 0.5 MILLIGRAM(S): at 17:57

## 2019-05-28 RX ADMIN — QUETIAPINE FUMARATE 25 MILLIGRAM(S): 200 TABLET, FILM COATED ORAL at 00:15

## 2019-05-28 RX ADMIN — Medication 62.5 MILLIGRAM(S): at 08:14

## 2019-05-28 RX ADMIN — APIXABAN 2.5 MILLIGRAM(S): 2.5 TABLET, FILM COATED ORAL at 07:47

## 2019-05-28 RX ADMIN — ALBUTEROL 2.5 MILLIGRAM(S): 90 AEROSOL, METERED ORAL at 12:12

## 2019-05-28 RX ADMIN — APIXABAN 5 MILLIGRAM(S): 2.5 TABLET, FILM COATED ORAL at 17:19

## 2019-05-28 RX ADMIN — Medication 1 TABLET(S): at 11:15

## 2019-05-28 RX ADMIN — FAMOTIDINE 20 MILLIGRAM(S): 10 INJECTION INTRAVENOUS at 11:18

## 2019-05-28 RX ADMIN — MIDODRINE HYDROCHLORIDE 10 MILLIGRAM(S): 2.5 TABLET ORAL at 13:00

## 2019-05-28 RX ADMIN — QUETIAPINE FUMARATE 25 MILLIGRAM(S): 200 TABLET, FILM COATED ORAL at 22:02

## 2019-05-28 RX ADMIN — TIZANIDINE 4 MILLIGRAM(S): 4 TABLET ORAL at 22:03

## 2019-05-28 RX ADMIN — CEFEPIME 100 MILLIGRAM(S): 1 INJECTION, POWDER, FOR SOLUTION INTRAMUSCULAR; INTRAVENOUS at 17:32

## 2019-05-28 RX ADMIN — Medication 1.5 MILLIGRAM(S): at 00:02

## 2019-05-28 RX ADMIN — CHLORHEXIDINE GLUCONATE 1 APPLICATION(S): 213 SOLUTION TOPICAL at 06:09

## 2019-05-28 RX ADMIN — ALBUTEROL 2.5 MILLIGRAM(S): 90 AEROSOL, METERED ORAL at 23:50

## 2019-05-28 RX ADMIN — Medication 1.5 MILLIGRAM(S): at 22:03

## 2019-05-28 RX ADMIN — ALBUTEROL 2.5 MILLIGRAM(S): 90 AEROSOL, METERED ORAL at 17:56

## 2019-05-28 RX ADMIN — Medication 500 MILLIGRAM(S): at 11:15

## 2019-05-28 RX ADMIN — CHLORHEXIDINE GLUCONATE 1 APPLICATION(S): 213 SOLUTION TOPICAL at 11:56

## 2019-05-28 RX ADMIN — Medication 100 MILLIGRAM(S): at 13:00

## 2019-05-28 RX ADMIN — GABAPENTIN 300 MILLIGRAM(S): 400 CAPSULE ORAL at 21:37

## 2019-05-28 RX ADMIN — GABAPENTIN 300 MILLIGRAM(S): 400 CAPSULE ORAL at 11:15

## 2019-05-28 RX ADMIN — MIDODRINE HYDROCHLORIDE 10 MILLIGRAM(S): 2.5 TABLET ORAL at 05:12

## 2019-05-28 RX ADMIN — CEFEPIME 100 MILLIGRAM(S): 1 INJECTION, POWDER, FOR SOLUTION INTRAMUSCULAR; INTRAVENOUS at 05:12

## 2019-05-28 RX ADMIN — Medication 100 MILLIGRAM(S): at 22:02

## 2019-05-28 RX ADMIN — TIZANIDINE 4 MILLIGRAM(S): 4 TABLET ORAL at 08:07

## 2019-05-28 RX ADMIN — Medication 1 APPLICATION(S): at 11:56

## 2019-05-28 NOTE — PROGRESS NOTE ADULT - ATTENDING COMMENTS
Agree with above. Patient seen and examined. Laboratory data and imaging reviewed. Patient with a lengthy medical history including chronic contractures with multiple decubs including sacral decubitus, chronic MRSA hip infection with spacer who initially presented with fevers. She had progressive hypotension on the floors and was brought to MICU for further care.    1. Septic Shock  - Continue Midodrine and IVF resuscitation with goal MAP > 65  - She may require vasopressor support  - Continue broad spectrum antibiotics - ID followup  - Start stress dose steroids given chronic Prednisone use  2. ID  - Wound care followup  - ID followup  - Broad spectrum antibiotics  - Patient also with evidence of aspiration and left upper lung field consolidation on bedside US  - C. diff negative  3. Cardiovascular  - Afib on AC with Apixaban  - Hold BB meds given hypotension. Monitor HR  4. Neurologic  - Baseline bedbound and nonverbal  - Contractures with multiple decub    I have personally provided 45 minutes of non-continuous critical care time.
Agree with above. Patient seen and examined. Laboratory data and imaging reviewed. Patient with a lengthy medical history including chronic contractures with multiple decubs including sacral decubitus, chronic MRSA hip infection with spacer who initially presented with fevers. She had progressive hypotension on the floors and was brought to MICU for further care.    1. Septic Shock  - Continue Midodrine and IVF resuscitation with goal MAP > 65  - She has not required vasopressors while in ICU  - Continue broad spectrum antibiotics - ID followup  - Start stress dose steroids given chronic Prednisone use - with plan to taper Hydrocortisone starting tomorrow  2. ID  - Wound care followup  - ID followup  - Broad spectrum antibiotics  - Patient also with evidence of aspiration and left upper lung field consolidation on bedside US  - C. diff negative  3. Cardiovascular  - Afib on AC with Apixaban  - Hold BB meds given hypotension. Monitor HR  4. Neurologic  - Baseline bedbound and nonverbal  - Contractures with multiple decub  - Resume psych meds - initially held in setting of hypotension    Extensive discussion with patients  regarding medication regimen. He is asking that her psych meds (Seroquel, Klonopin, and Gabapentin) not be changed if possible.

## 2019-05-28 NOTE — PROGRESS NOTE ADULT - SUBJECTIVE AND OBJECTIVE BOX
infectious diseases progress note:    Patient is a 69y old  Female who presents with a chief complaint of pneumonia (28 May 2019 06:56)        Pneumonia           Allergies    ASA; dye contrast (Anaphylaxis)  aspirin (Short breath)  divalproex sodium (Other (Unknown))  Flowers and Plants (Short breath)  Haldol (Other (Unknown))  penicillin (Short breath; Rash)  sulfa drugs (Short breath; Rash)  vancomycin (Rash; Urticaria; Hives)  Xanax (Other (Unknown))    Intolerances        ANTIBIOTICS/RELEVANT:  antimicrobials  cefepime   IVPB 1000 milliGRAM(s) IV Intermittent every 12 hours  metroNIDAZOLE  IVPB 500 milliGRAM(s) IV Intermittent every 8 hours    immunologic:    OTHER:  ALBUTerol    0.083% 2.5 milliGRAM(s) Nebulizer every 6 hours  ALBUTerol    90 MICROgram(s) HFA Inhaler 1 Puff(s) Inhalation every 4 hours  apixaban 2.5 milliGRAM(s) Oral every 12 hours  ascorbic acid 500 milliGRAM(s) Oral daily  Awake Gel Capsules 20:1 (THC:CBD) 2 Capsule(s) 2 Capsule(s) Oral <User Schedule>  buDESOnide   0.5 milliGRAM(s) Respule 0.5 milliGRAM(s) Inhalation every 12 hours  Calm Drops 50:1 (THC:CBD) 2 milliLiter(s) 2 milliLiter(s) Oral <User Schedule>  chlorhexidine 2% Cloths 1 Application(s) Topical daily  chlorhexidine 4% Liquid 1 Application(s) Topical <User Schedule>  clonazePAM  Tablet 1.5 milliGRAM(s) Oral at bedtime  collagenase Ointment 1 Application(s) Topical daily  diphenoxylate/atropine 1 Tablet(s) Oral daily  famotidine   Suspension 20 milliGRAM(s) Oral daily  [place] Drops 1:1 THC:CBD 3 milliLiter(s) 3 milliLiter(s) Oral <User Schedule>  hydrocortisone sodium succinate Injectable 100 milliGRAM(s) IV Push every 8 hours  lactobacillus acidophilus 1 Tablet(s) Oral daily  midodrine 10 milliGRAM(s) Oral every 8 hours  multivitamin 1 Tablet(s) Oral daily  norepinephrine Infusion 0.05 MICROgram(s)/kG/Min IV Continuous <Continuous>  QUEtiapine 25 milliGRAM(s) Oral at bedtime  sodium chloride 0.9%. 1000 milliLiter(s) IV Continuous <Continuous>  tiZANidine 4 milliGRAM(s) Oral <User Schedule>      Objective:  Vital Signs Last 24 Hrs  T(C): 36.9 (28 May 2019 03:00), Max: 36.9 (27 May 2019 08:00)  T(F): 98.4 (28 May 2019 03:00), Max: 98.5 (27 May 2019 19:00)  HR: 99 (28 May 2019 07:00) (73 - 104)  BP: 144/78 (28 May 2019 06:00) (78/44 - 144/78)  BP(mean): 112 (28 May 2019 06:00) (56 - 112)  RR: 16 (28 May 2019 07:00) (13 - 48)  SpO2: 100% (28 May 2019 07:00) (74% - 100%)    PHYSICAL EXAM:     Eyes:JACQUELIN, EOMI  Ear/Nose/Throat: no oral lesion, no sinus tenderness on percussion	  Neck:no JVD, no lymphadenopathy, supple  Respiratory: CTA maryjane  Cardiovascular: S1S2 RRR, no murmurs  Gastrointestinal:soft, (+) BS, no HSM  Extremities contracted          LABS:                        7.6    12.3  )-----------( 273      ( 28 May 2019 03:31 )             22.6     05-28    134<L>  |  103  |  24<H>  ----------------------------<  224<H>  3.7   |  19<L>  |  <0.30<L>    Ca    8.2<L>      28 May 2019 03:31  Phos  2.5     05-28  Mg     2.0     05-28    TPro  4.7<L>  /  Alb  2.2<L>  /  TBili  0.1<L>  /  DBili  x   /  AST  21  /  ALT  12  /  AlkPhos  73  05-28    PT/INR - ( 27 May 2019 01:25 )   PT: 16.0 sec;   INR: 1.38 ratio         PTT - ( 27 May 2019 01:25 )  PTT:27.6 sec        MICROBIOLOGY:    RECENT CULTURES:  05-27 @ 05:58 .Blood                No growth to date.    05-24 @ 05:05 .Urine                >=3 organisms. Probable collection contamination.    05-24 @ 02:36 .Blood   PCR    Growth in anaerobic bottle: Gram positive cocci in pairs    Blood Culture PCR  Blood Culture PCR     Growth in anaerobic bottle: Coag Negative Staphylococcus  Single set isolate, possible contaminant. Contact  Microbiology if susceptibility testing clinically  indicated.  "Due to technical problems, Proteus sp. will Not be reported as part of  theBCID panel until further notice"  ***Blood Panel PCR results on this specimen are available  approximately 3 hours after the Gram stain result.***  Gram stain, PCR, and/or culture results may not always  correspond due to difference in methodologies.  ************************************************************  This PCR assay was performed using [employer].  The following targets are tested for: Enterococcus,  vancomycin resistant enterococci, Listeria monocytogenes,  coagulase negative staphylococci, S. aureus,  methicillin resistant S. aureus, Streptococcus agalactiae  (Group B), S. pneumoniae, S. pyogenes (Group A),  Acinetobacter baumannii, Enterobacter cloacae, E. coli,  Klebsiella oxytoca, K. pneumoniae, Proteus sp.,  Serratia marcescens, Haemophilus influenzae,  Neisseria meningitidis, Pseudomonas aeruginosa, Candida  albicans, C. glabrata, C krusei, C parapsilosis,  C. tropicalis and the KPC resistance gene.          RESPIRATORY CULTURES:      Clostridium difficile GDH Toxins A&amp;B, EIA:   Negative (05-26 @ 00:02)          RADIOLOGY & ADDITIONAL STUDIES:        Pager [phone]  After 5 pm/weekends or if no response :[phone]

## 2019-05-28 NOTE — PROGRESS NOTE ADULT - SUBJECTIVE AND OBJECTIVE BOX
---___---___---___---___---___---___ ---___---___---___---___---___---___---___---___---                  M E D I C A L   A T T E N D I N G   P R O G R E S S   N O T E  ---___---___---___---___---___---___ ---___---___---___---___---___---___---___---___---        ================================================    ++CHIEF COMPLAINT:   Patient is a 69y old  Female who presents with a chief complaint of pneumonia (28 May 2019 07:24)      hypotension resolvd awake following commands today     ---___---___---___---___---___---  PAST MEDICAL / Surgical  HISTORY:  PAST MEDICAL & SURGICAL HISTORY:  CVA (cerebral vascular accident)  Fatty pancreas  PNA (pneumonia)  Pulmonary HTN  IGT (impaired glucose tolerance)  Ulcerative colitis  Acid reflux  Anxiety  Depression  Mouth sores  HLD (hyperlipidemia)  Asthma  Humeral head fracture  H/O: hysterectomy  H/O cataract extraction, left  History of knee replacement      ---___---___---___---___---___---  FAMILY HISTORY:   FAMILY HISTORY:  Family history of dementia (Grandparent)  Family history of colon cancer (Grandparent)        ---___---___---___---___---___---  ALLERGIES:   Allergies    ASA; dye contrast (Anaphylaxis)  aspirin (Short breath)  divalproex sodium (Other (Unknown))  Flowers and Plants (Short breath)  Haldol (Other (Unknown))  penicillin (Short breath; Rash)  sulfa drugs (Short breath; Rash)  vancomycin (Rash; Urticaria; Hives)  Xanax (Other (Unknown))    Intolerances        ---___---___---___---___---___---  MEDICATIONS:  MEDICATIONS  (STANDING):  ALBUTerol    0.083% 2.5 milliGRAM(s) Nebulizer every 6 hours  ALBUTerol    90 MICROgram(s) HFA Inhaler 1 Puff(s) Inhalation every 4 hours  apixaban 5 milliGRAM(s) Oral every 12 hours  ascorbic acid 500 milliGRAM(s) Oral daily  Awake Gel Capsules 20:1 (THC:CBD) 2 Capsule(s) 2 Capsule(s) Oral <User Schedule>  buDESOnide   0.5 milliGRAM(s) Respule 0.5 milliGRAM(s) Inhalation every 12 hours  Calm Drops 50:1 (THC:CBD) 2 milliLiter(s) 2 milliLiter(s) Oral <User Schedule>  cefepime   IVPB 1000 milliGRAM(s) IV Intermittent every 12 hours  chlorhexidine 2% Cloths 1 Application(s) Topical daily  chlorhexidine 4% Liquid 1 Application(s) Topical <User Schedule>  clonazePAM  Tablet 1.5 milliGRAM(s) Oral at bedtime  collagenase Ointment 1 Application(s) Topical daily  diphenoxylate/atropine 1 Tablet(s) Oral daily  famotidine   Suspension 20 milliGRAM(s) Oral daily  gabapentin   Solution 300 milliGRAM(s) Oral two times a day  [place] Drops 1:1 THC:CBD 3 milliLiter(s) 3 milliLiter(s) Oral <User Schedule>  hydrocortisone sodium succinate Injectable 100 milliGRAM(s) IV Push every 8 hours  lactobacillus acidophilus 1 Tablet(s) Oral daily  midodrine 10 milliGRAM(s) Oral every 8 hours  multivitamin 1 Tablet(s) Oral daily  norepinephrine Infusion 0.05 MICROgram(s)/kG/Min (1.913 mL/Hr) IV Continuous <Continuous>  QUEtiapine 25 milliGRAM(s) Oral at bedtime  tiZANidine 4 milliGRAM(s) Oral <User Schedule>  vancomycin  IVPB 750 milliGRAM(s) IV Intermittent every 12 hours  vancomycin  IVPB        MEDICATIONS  (PRN):      ---___---___---___---___---___---  REVIEW OF SYSTEM:    GEN: no fever, no chills, no pain  RESP: no SOB, no cough, no sputum  CVS: no chest pain, no palpitations, no edema  GI: no abdominal pain, no nausea, no vomiting, no constipation, no diarrhea  : no dysurea, no frequency, no hematurea  Neuro: no headache, no dizziness  PSYCH: no anxiety, no depression  Derm : no itching, no rash    ---___---___---___---___---___---  VITAL SIGNS:  69y , CAPILLARY BLOOD GLUCOSE      POCT Blood Glucose.: 127 mg/dL (27 May 2019 18:45)    T(C): 37.2 (19 @ 11:00), Max: 37.2 (19 @ 11:00)  HR: 67 (19 @ 13:00) (56 - 104)  BP: 152/70 (19 @ 13:00) (94/49 - 161/81)  RR: 16 (19 @ 13:00) (14 - 48)  SpO2: 100% (19 @ 13:00) (74% - 100%)  ---___---___---___---___---___---  PHYSICAL EXAM:    GEN: A&O X 1 , NAD , comfortable  HEENT: NCAT, PERRL, MMM, hearing intact  Neck: supple , no JVD  CVS: S1S2 , regular , No M/R/G appreciated  PULM: CTA B/L,  no W/R/R appreciated   ABD.: soft. non tender, non distended,  bowel sounds present peg noted   Extrem: intact pulses , no edema   Derm: No rash , no ecchymoses  PSYCH : normal mood,  no delusion not anxious     ---___---___---___---___---___---            LAB AND IMAGIN.6    12.3  )-----------( 273      ( 28 May 2019 03:31 )             22.6                   134<L>  |  103  |  24<H>  ----------------------------<  224<H>  3.7   |  19<L>  |  <0.30<L>    Ca    8.2<L>      28 May 2019 03:31  Phos  2.5       Mg     2.0         TPro  4.7<L>  /  Alb  2.2<L>  /  TBili  0.1<L>  /  DBili  x   /  AST  21  /  ALT  12  /  AlkPhos  73      PT/INR - ( 27 May 2019 01:25 )   PT: 16.0 sec;   INR: 1.38 ratio         PTT - ( 27 May 2019 01:25 )  PTT:27.6 sec                      ABG - ( 27 May 2019 01:18 )  pH, Arterial: 7.50  pH, Blood: x     /  pCO2: 30    /  pO2: 111   / HCO3: 24    / Base Excess: 1.1   /  SaO2: 100                   [All pertinent / recent Imaging reviewed]         ---___---___---___---___---___---___ ---___---___---___---___---                         A S S E S S M E N T   A N D   P L A N :      HEALTH ISSUES - PROBLEM Dx:  lhypotension improved   IGT (impaired glucose tolerance): IGT (impaired glucose tolerance) on insulin regimen  Depression: Depression contine meds   Anxiety: Anxiety contnue care and plan   Sepsis, due to unspecified organism: Sepsis, due to unspecified organism on iiv abx   Aspiration pneumonia of right middle lobe, unspecified aspiration pneumonia type: Aspiration pneumonia of right middle lobe, unspecified aspiration pneumonia type  as above  sacral decubitus stage 4 contiue meds                 -GI/DVT Prophylaxis.    --------------------------------------------  Case discussed with   Education given on   ___________________________  Thank Deniz payne  [phone]

## 2019-05-28 NOTE — CHART NOTE - NSCHARTNOTEFT_GEN_A_CORE
MICU Transfer Note    Transfer from: MICU    Transfer to: ( x ) Medicine    (  ) Telemetry     (   ) RCU        (    ) Palliative         (   ) Stroke Unit          (   ) __________________    Accepting physician: Dr. Bolden    MICU COURSE:   The pt was transferred to the MICU on 5/27, improved dramatically with broader abx, PO midodrine and fluids and did not require IV pressor support. The pt had an episode of hypotension to high 70s SBP on 5/27 which was responsive to 500cc of fluids. A POCUS found a MAURA consolidation. Vanc was supratherapeutic so dose was held and restarted on 5/28. Home medications were restarted, the pt's eliquis was changed to 5 BID. The pt was also started on free water through her PEG.    The pt is medically optimized for transfer to the floors.      Vital Signs Last 24 Hrs  T(C): 36.7 (28 May 2019 07:00), Max: 36.9 (27 May 2019 19:00)  T(F): 98 (28 May 2019 07:00), Max: 98.5 (27 May 2019 19:00)  HR: 66 (28 May 2019 10:00) (66 - 104)  BP: 102/53 (28 May 2019 10:00) (93/64 - 144/78)  BP(mean): 76 (28 May 2019 10:00) (69 - 112)  RR: 14 (28 May 2019 10:00) (13 - 48)  SpO2: 100% (28 May 2019 10:00) (74% - 100%)  I&O's Summary    27 May 2019 07:01  -  28 May 2019 07:00  --------------------------------------------------------  IN: 3909 mL / OUT: 3119 mL / NET: 790 mL    28 May 2019 07:01  -  28 May 2019 10:59  --------------------------------------------------------  IN: 774.5 mL / OUT: 100 mL / NET: 674.5 mL        MEDICATIONS  (STANDING):  ALBUTerol    0.083% 2.5 milliGRAM(s) Nebulizer every 6 hours  ALBUTerol    90 MICROgram(s) HFA Inhaler 1 Puff(s) Inhalation every 4 hours  apixaban 5 milliGRAM(s) Oral every 12 hours  ascorbic acid 500 milliGRAM(s) Oral daily  Awake Gel Capsules 20:1 (THC:CBD) 2 Capsule(s) 2 Capsule(s) Oral <User Schedule>  buDESOnide   0.5 milliGRAM(s) Respule 0.5 milliGRAM(s) Inhalation every 12 hours  Calm Drops 50:1 (THC:CBD) 2 milliLiter(s) 2 milliLiter(s) Oral <User Schedule>  cefepime   IVPB 1000 milliGRAM(s) IV Intermittent every 12 hours  chlorhexidine 2% Cloths 1 Application(s) Topical daily  chlorhexidine 4% Liquid 1 Application(s) Topical <User Schedule>  clonazePAM  Tablet 1.5 milliGRAM(s) Oral at bedtime  collagenase Ointment 1 Application(s) Topical daily  diphenoxylate/atropine 1 Tablet(s) Oral daily  famotidine   Suspension 20 milliGRAM(s) Oral daily  gabapentin   Solution 300 milliGRAM(s) Oral once  gabapentin   Solution 300 milliGRAM(s) Oral two times a day  [place] Drops 1:1 THC:CBD 3 milliLiter(s) 3 milliLiter(s) Oral <User Schedule>  hydrocortisone sodium succinate Injectable 100 milliGRAM(s) IV Push every 8 hours  lactobacillus acidophilus 1 Tablet(s) Oral daily  metroNIDAZOLE  IVPB 500 milliGRAM(s) IV Intermittent every 8 hours  midodrine 10 milliGRAM(s) Oral every 8 hours  multivitamin 1 Tablet(s) Oral daily  norepinephrine Infusion 0.05 MICROgram(s)/kG/Min (1.913 mL/Hr) IV Continuous <Continuous>  QUEtiapine 25 milliGRAM(s) Oral at bedtime  tiZANidine 4 milliGRAM(s) Oral <User Schedule>  vancomycin  IVPB      vancomycin  IVPB 750 milliGRAM(s) IV Intermittent every 12 hours      LABS:                                              7.6                   Neurophils% (auto):   x      (05-28 @ 03:31):    12.3 )-----------(273          Lymphocytes% (auto):  x                                             22.6                   Eosinphils% (auto):   x        Manual%: Neutrophils x    ; Lymphocytes x    ; Eosinophils x    ; Bands%: x    ; Blasts x                                    134    |  103    |  24                  Calcium: 8.2   / iCa: x      (05-28 @ 03:31)    ----------------------------<  224       Magnesium: 2.0                              3.7     |  19     |  <0.30            Phosphorous: 2.5      TPro  4.7    /  Alb  2.2    /  TBili  0.1    /  DBili  x      /  AST  21     /  ALT  12     /  AlkPhos  73     28 May 2019 03:31          69 F w/ multiple comorbidities including advanced dementia with severe contractures, large decubitus ulcer, chronic mrsa infection of left hip with a spacer that can not be removed a/w sepsis 2/2 MRSA wound infection vs. aspiration PNA. Initially fluid responsive, but became hypotensive again. Transferred to MICU for pressor support.     #Neuro  -unable to assess, baseline severely contracted and non-verbal, unable to follow commands. Per family, pt is at her baseline (and actually looks the "best she has in a month")  -Had held Seroquel, Clonazepam, gabapentin but family concerned so continued with home medicines.    #Resp  -POCUS w/ MAURA posterior consolidation concerning for PNA. C/w vanco/cefepime/flagyl. ID following  -Vanc restarted, f/u pre-4th trough    #CVS  -Pt never required IV pressor support, but remained on oral midodrine. c/w midodrine 10q8h  -Hold anti-hypertensives for now  -c/w a/c for afib  -Would recommend IVF if hypotensive    #GI  -No active issues  -c/w tube feeds    #Renal  -Cr at baseline, monitor urine output while on IVF.    #Heme  -Leukocytosis likely in setting of infection. abx as below.     #Endo  -Pt reportedly has been on chronic prednisone 7.5mg for ?asthma. Will plan to taper stress dose solu-cortef 100mg q8h to 50q8 tmw. Would recommend taper down to home dose subsequently as long as she remains improving.    #ID  -C/w vanco/cefepime. Repeat blood cx sent. Coag neg staph may be contaminant, but if persistently present may need repeat TTE to r/o endocarditis. 5/26 +BCx GPC in clusters.  -c/w flagyl for now for anaerobic coverage, pending callback from Dr. Looney  -Vanc trough pre-4th ordered  -ID following. If pt is unimproved, would recommend CT C/A/P    #Ethics  -Full code     #Dispo: Pt is medically optimized for transfer to floors        For Followup:  [ ] Taper steroids - plan for home dose on 5/30 subsequent to a day of 50q8 solumedrol on 5/29  [ ] F/u vanc trough pre-4th and adjust vanc dose  [ ] F/u wound care recs (order placed) MICU Transfer Note    Transfer from: MICU    Transfer to: ( x ) Medicine    (  ) Telemetry     (   ) RCU        (    ) Palliative         (   ) Stroke Unit          (   ) __________________    Accepting physician: Dr. Bolden    MICU COURSE:   The pt was transferred to the MICU on 5/27, improved dramatically with broader abx, PO midodrine and fluids and did not require IV pressor support. The pt had an episode of hypotension to high 70s SBP on 5/27 which was responsive to 500cc of fluids. A POCUS found a MAURA consolidation. Vanc was supratherapeutic so dose was held and restarted on 5/28. Home medications were restarted, the pt's eliquis was changed to 5 BID. The pt was also started on free water through her PEG.    The pt is medically optimized for transfer to the floors.      Vital Signs Last 24 Hrs  T(C): 36.7 (28 May 2019 07:00), Max: 36.9 (27 May 2019 19:00)  T(F): 98 (28 May 2019 07:00), Max: 98.5 (27 May 2019 19:00)  HR: 66 (28 May 2019 10:00) (66 - 104)  BP: 102/53 (28 May 2019 10:00) (93/64 - 144/78)  BP(mean): 76 (28 May 2019 10:00) (69 - 112)  RR: 14 (28 May 2019 10:00) (13 - 48)  SpO2: 100% (28 May 2019 10:00) (74% - 100%)  I&O's Summary    27 May 2019 07:01  -  28 May 2019 07:00  --------------------------------------------------------  IN: 3909 mL / OUT: 3119 mL / NET: 790 mL    28 May 2019 07:01  -  28 May 2019 10:59  --------------------------------------------------------  IN: 774.5 mL / OUT: 100 mL / NET: 674.5 mL        MEDICATIONS  (STANDING):  ALBUTerol    0.083% 2.5 milliGRAM(s) Nebulizer every 6 hours  ALBUTerol    90 MICROgram(s) HFA Inhaler 1 Puff(s) Inhalation every 4 hours  apixaban 5 milliGRAM(s) Oral every 12 hours  ascorbic acid 500 milliGRAM(s) Oral daily  Awake Gel Capsules 20:1 (THC:CBD) 2 Capsule(s) 2 Capsule(s) Oral <User Schedule>  buDESOnide   0.5 milliGRAM(s) Respule 0.5 milliGRAM(s) Inhalation every 12 hours  Calm Drops 50:1 (THC:CBD) 2 milliLiter(s) 2 milliLiter(s) Oral <User Schedule>  cefepime   IVPB 1000 milliGRAM(s) IV Intermittent every 12 hours  chlorhexidine 2% Cloths 1 Application(s) Topical daily  chlorhexidine 4% Liquid 1 Application(s) Topical <User Schedule>  clonazePAM  Tablet 1.5 milliGRAM(s) Oral at bedtime  collagenase Ointment 1 Application(s) Topical daily  diphenoxylate/atropine 1 Tablet(s) Oral daily  famotidine   Suspension 20 milliGRAM(s) Oral daily  gabapentin   Solution 300 milliGRAM(s) Oral once  gabapentin   Solution 300 milliGRAM(s) Oral two times a day  [place] Drops 1:1 THC:CBD 3 milliLiter(s) 3 milliLiter(s) Oral <User Schedule>  hydrocortisone sodium succinate Injectable 100 milliGRAM(s) IV Push every 8 hours  lactobacillus acidophilus 1 Tablet(s) Oral daily  metroNIDAZOLE  IVPB 500 milliGRAM(s) IV Intermittent every 8 hours  midodrine 10 milliGRAM(s) Oral every 8 hours  multivitamin 1 Tablet(s) Oral daily  norepinephrine Infusion 0.05 MICROgram(s)/kG/Min (1.913 mL/Hr) IV Continuous <Continuous>  QUEtiapine 25 milliGRAM(s) Oral at bedtime  tiZANidine 4 milliGRAM(s) Oral <User Schedule>  vancomycin  IVPB      vancomycin  IVPB 750 milliGRAM(s) IV Intermittent every 12 hours      LABS:                                              7.6                   Neurophils% (auto):   x      (05-28 @ 03:31):    12.3 )-----------(273          Lymphocytes% (auto):  x                                             22.6                   Eosinphils% (auto):   x        Manual%: Neutrophils x    ; Lymphocytes x    ; Eosinophils x    ; Bands%: x    ; Blasts x                                    134    |  103    |  24                  Calcium: 8.2   / iCa: x      (05-28 @ 03:31)    ----------------------------<  224       Magnesium: 2.0                              3.7     |  19     |  <0.30            Phosphorous: 2.5      TPro  4.7    /  Alb  2.2    /  TBili  0.1    /  DBili  x      /  AST  21     /  ALT  12     /  AlkPhos  73     28 May 2019 03:31          69 F w/ multiple comorbidities including advanced dementia with severe contractures, large decubitus ulcer, chronic mrsa infection of left hip with a spacer that can not be removed a/w sepsis 2/2 MRSA wound infection vs. aspiration PNA. Initially fluid responsive, but became hypotensive again. Transferred to MICU for pressor support.     #Neuro  -unable to assess, baseline severely contracted and non-verbal, unable to follow commands. Per family, pt is at her baseline (and actually looks the "best she has in a month")  -Had held Seroquel, Clonazepam, gabapentin but family concerned so continued with home medicines.    #Resp  -POCUS w/ MAURA posterior consolidation concerning for PNA. C/w vanco/cefepime/flagyl. ID following  -Vanc restarted, f/u pre-4th trough    #CVS  -Pt never required IV pressor support, but remained on oral midodrine. c/w midodrine 10q8h  -Hold anti-hypertensives for now  -c/w a/c for afib  -Would recommend IVF if hypotensive    #GI  -No active issues  -c/w tube feeds    #Renal  -Cr at baseline, monitor urine output while on IVF.    #Heme  -Leukocytosis likely in setting of infection. abx as below.     #Endo  -Pt reportedly has been on chronic prednisone 7.5mg for ?asthma. Will plan to taper stress dose solu-cortef 100mg q8h to 50q8 tmw. Would recommend taper down to home dose subsequently as long as she remains improving.    #ID  -C/w vanco/cefepime. Repeat blood cx sent. Coag neg staph may be contaminant, but if persistently present may need repeat TTE to r/o endocarditis. 5/26 +BCx GPC in clusters.  -c/w flagyl for now for anaerobic coverage, pending callback from Dr. Looney  -Vanc trough pre-4th ordered  -ID following. If pt is unimproved, would recommend CT C/A/P    #Ethics  -Full code     #Dispo: Pt is medically optimized for transfer to floors        For Followup:  [ ] Taper steroids - plan for home dose on 5/30 subsequent to a day of 50q8 solumedrol on 5/29  [ ] F/u vanc trough pre-4th and adjust vanc dose  [ ] F/u wound care recs (order placed)  [ ] F/u final BCx from 5/26  [ ] Repeat BCx 5/29 AM to be sent for surveillance given positive cx on 5/26. MICU Transfer Note    Transfer from: MICU    Transfer to: ( x ) Medicine    (  ) Telemetry     (   ) RCU        (    ) Palliative         (   ) Stroke Unit          (   ) __________________    Accepting physician: Dr. Bolden    MICU COURSE:   The pt was transferred to the MICU on 5/27, improved dramatically with broader abx, PO midodrine and fluids and did not require IV pressor support. The pt had an episode of hypotension to high 70s SBP on 5/27 which was responsive to 500cc of fluids. A POCUS found a MAURA consolidation. Vanc was supratherapeutic so dose was held and restarted on 5/28. Home medications were restarted, the pt's eliquis was changed to 5 BID. The pt was also started on free water through her PEG.    The pt is medically optimized for transfer to the floors.      Vital Signs Last 24 Hrs  T(C): 36.7 (28 May 2019 07:00), Max: 36.9 (27 May 2019 19:00)  T(F): 98 (28 May 2019 07:00), Max: 98.5 (27 May 2019 19:00)  HR: 66 (28 May 2019 10:00) (66 - 104)  BP: 102/53 (28 May 2019 10:00) (93/64 - 144/78)  BP(mean): 76 (28 May 2019 10:00) (69 - 112)  RR: 14 (28 May 2019 10:00) (13 - 48)  SpO2: 100% (28 May 2019 10:00) (74% - 100%)  I&O's Summary    27 May 2019 07:01  -  28 May 2019 07:00  --------------------------------------------------------  IN: 3909 mL / OUT: 3119 mL / NET: 790 mL    28 May 2019 07:01  -  28 May 2019 10:59  --------------------------------------------------------  IN: 774.5 mL / OUT: 100 mL / NET: 674.5 mL        MEDICATIONS  (STANDING):  ALBUTerol    0.083% 2.5 milliGRAM(s) Nebulizer every 6 hours  ALBUTerol    90 MICROgram(s) HFA Inhaler 1 Puff(s) Inhalation every 4 hours  apixaban 5 milliGRAM(s) Oral every 12 hours  ascorbic acid 500 milliGRAM(s) Oral daily  Awake Gel Capsules 20:1 (THC:CBD) 2 Capsule(s) 2 Capsule(s) Oral <User Schedule>  buDESOnide   0.5 milliGRAM(s) Respule 0.5 milliGRAM(s) Inhalation every 12 hours  Calm Drops 50:1 (THC:CBD) 2 milliLiter(s) 2 milliLiter(s) Oral <User Schedule>  cefepime   IVPB 1000 milliGRAM(s) IV Intermittent every 12 hours  chlorhexidine 2% Cloths 1 Application(s) Topical daily  chlorhexidine 4% Liquid 1 Application(s) Topical <User Schedule>  clonazePAM  Tablet 1.5 milliGRAM(s) Oral at bedtime  collagenase Ointment 1 Application(s) Topical daily  diphenoxylate/atropine 1 Tablet(s) Oral daily  famotidine   Suspension 20 milliGRAM(s) Oral daily  gabapentin   Solution 300 milliGRAM(s) Oral once  gabapentin   Solution 300 milliGRAM(s) Oral two times a day  [place] Drops 1:1 THC:CBD 3 milliLiter(s) 3 milliLiter(s) Oral <User Schedule>  hydrocortisone sodium succinate Injectable 100 milliGRAM(s) IV Push every 8 hours  lactobacillus acidophilus 1 Tablet(s) Oral daily  metroNIDAZOLE  IVPB 500 milliGRAM(s) IV Intermittent every 8 hours  midodrine 10 milliGRAM(s) Oral every 8 hours  multivitamin 1 Tablet(s) Oral daily  norepinephrine Infusion 0.05 MICROgram(s)/kG/Min (1.913 mL/Hr) IV Continuous <Continuous>  QUEtiapine 25 milliGRAM(s) Oral at bedtime  tiZANidine 4 milliGRAM(s) Oral <User Schedule>  vancomycin  IVPB      vancomycin  IVPB 750 milliGRAM(s) IV Intermittent every 12 hours      LABS:                                              7.6                   Neurophils% (auto):   x      (05-28 @ 03:31):    12.3 )-----------(273          Lymphocytes% (auto):  x                                             22.6                   Eosinphils% (auto):   x        Manual%: Neutrophils x    ; Lymphocytes x    ; Eosinophils x    ; Bands%: x    ; Blasts x                                    134    |  103    |  24                  Calcium: 8.2   / iCa: x      (05-28 @ 03:31)    ----------------------------<  224       Magnesium: 2.0                              3.7     |  19     |  <0.30            Phosphorous: 2.5      TPro  4.7    /  Alb  2.2    /  TBili  0.1    /  DBili  x      /  AST  21     /  ALT  12     /  AlkPhos  73     28 May 2019 03:31          69 F w/ multiple comorbidities including advanced dementia with severe contractures, large decubitus ulcer, chronic mrsa infection of left hip with a spacer that can not be removed a/w sepsis 2/2 MRSA wound infection vs. aspiration PNA. Initially fluid responsive, but became hypotensive again. Transferred to MICU for pressor support.     #Neuro  -unable to assess, baseline severely contracted and non-verbal, unable to follow commands. Per family, pt is at her baseline (and actually looks the "best she has in a month")  -Had held Seroquel, Clonazepam, gabapentin but family concerned so continued with home medicines.    #Resp  -POCUS w/ MAURA posterior consolidation concerning for PNA. C/w vanco/cefepime/flagyl. ID following  -Vanc restarted, f/u pre-4th trough    #CVS  -Pt never required IV pressor support, but remained on oral midodrine. c/w midodrine 10q8h  -Hold anti-hypertensives for now  -c/w a/c for afib  -Would recommend IVF if hypotensive    #GI  -No active issues  -c/w tube feeds    #Renal  -Cr at baseline, monitor urine output while on IVF.    #Heme  -Leukocytosis likely in setting of infection. abx as below.     #Endo  -Pt reportedly has been on chronic prednisone 7.5mg for ?asthma. Will plan to taper stress dose solu-cortef 100mg q8h to 50q8 tmw. Would recommend taper down to home dose subsequently as long as she remains improving.    #ID  -C/w vanco/cefepime. Repeat blood cx sent. Coag neg staph may be contaminant, but if persistently present may need repeat TTE to r/o endocarditis. 5/26 +BCx GPC in clusters.  -hold flagyl per Dr. Brand  -Vanc trough pre-4th ordered  -ID following. If pt is unimproved, would recommend CT C/A/P    #Ethics  -Full code     #Dispo: Pt is medically optimized for transfer to floors        For Followup:  [ ] Taper steroids - plan for home dose on 5/30 subsequent to a day of 50q8 solumedrol on 5/29  [ ] F/u vanc trough pre-4th and adjust vanc dose  [ ] F/u wound care recs (order placed)  [ ] F/u final BCx from 5/26  [ ] Repeat BCx 5/29 AM to be sent for surveillance given positive cx on 5/26. MICU Transfer Note    Transfer from: MICU    Transfer to: ( x ) Medicine    (  ) Telemetry     (   ) RCU        (    ) Palliative         (   ) Stroke Unit          (   ) __________________    Accepting physician: Dr. Bolden    MICU COURSE:   The pt was transferred to the MICU on 5/27, improved dramatically with broader abx, PO midodrine and fluids and did not require IV pressor support. The pt had an episode of hypotension to high 70s SBP on 5/27 which was responsive to 500cc of fluids. A POCUS found a MAURA consolidation. Vanc was supratherapeutic so dose was held and restarted on 5/28. Home medications were restarted, the pt's eliquis was changed to 5 BID. The pt was also started on free water through her PEG.    The pt is medically optimized for transfer to the floors.      Vital Signs Last 24 Hrs  T(C): 36.7 (28 May 2019 07:00), Max: 36.9 (27 May 2019 19:00)  T(F): 98 (28 May 2019 07:00), Max: 98.5 (27 May 2019 19:00)  HR: 66 (28 May 2019 10:00) (66 - 104)  BP: 102/53 (28 May 2019 10:00) (93/64 - 144/78)  BP(mean): 76 (28 May 2019 10:00) (69 - 112)  RR: 14 (28 May 2019 10:00) (13 - 48)  SpO2: 100% (28 May 2019 10:00) (74% - 100%)  I&O's Summary    27 May 2019 07:01  -  28 May 2019 07:00  --------------------------------------------------------  IN: 3909 mL / OUT: 3119 mL / NET: 790 mL    28 May 2019 07:01  -  28 May 2019 10:59  --------------------------------------------------------  IN: 774.5 mL / OUT: 100 mL / NET: 674.5 mL        MEDICATIONS  (STANDING):  ALBUTerol    0.083% 2.5 milliGRAM(s) Nebulizer every 6 hours  ALBUTerol    90 MICROgram(s) HFA Inhaler 1 Puff(s) Inhalation every 4 hours  apixaban 5 milliGRAM(s) Oral every 12 hours  ascorbic acid 500 milliGRAM(s) Oral daily  Awake Gel Capsules 20:1 (THC:CBD) 2 Capsule(s) 2 Capsule(s) Oral <User Schedule>  buDESOnide   0.5 milliGRAM(s) Respule 0.5 milliGRAM(s) Inhalation every 12 hours  Calm Drops 50:1 (THC:CBD) 2 milliLiter(s) 2 milliLiter(s) Oral <User Schedule>  cefepime   IVPB 1000 milliGRAM(s) IV Intermittent every 12 hours  chlorhexidine 2% Cloths 1 Application(s) Topical daily  chlorhexidine 4% Liquid 1 Application(s) Topical <User Schedule>  clonazePAM  Tablet 1.5 milliGRAM(s) Oral at bedtime  collagenase Ointment 1 Application(s) Topical daily  diphenoxylate/atropine 1 Tablet(s) Oral daily  famotidine   Suspension 20 milliGRAM(s) Oral daily  gabapentin   Solution 300 milliGRAM(s) Oral once  gabapentin   Solution 300 milliGRAM(s) Oral two times a day  [place] Drops 1:1 THC:CBD 3 milliLiter(s) 3 milliLiter(s) Oral <User Schedule>  hydrocortisone sodium succinate Injectable 100 milliGRAM(s) IV Push every 8 hours  lactobacillus acidophilus 1 Tablet(s) Oral daily  metroNIDAZOLE  IVPB 500 milliGRAM(s) IV Intermittent every 8 hours  midodrine 10 milliGRAM(s) Oral every 8 hours  multivitamin 1 Tablet(s) Oral daily  norepinephrine Infusion 0.05 MICROgram(s)/kG/Min (1.913 mL/Hr) IV Continuous <Continuous>  QUEtiapine 25 milliGRAM(s) Oral at bedtime  tiZANidine 4 milliGRAM(s) Oral <User Schedule>  vancomycin  IVPB      vancomycin  IVPB 750 milliGRAM(s) IV Intermittent every 12 hours      LABS:                                              7.6                   Neurophils% (auto):   x      (05-28 @ 03:31):    12.3 )-----------(273          Lymphocytes% (auto):  x                                             22.6                   Eosinphils% (auto):   x        Manual%: Neutrophils x    ; Lymphocytes x    ; Eosinophils x    ; Bands%: x    ; Blasts x                                    134    |  103    |  24                  Calcium: 8.2   / iCa: x      (05-28 @ 03:31)    ----------------------------<  224       Magnesium: 2.0                              3.7     |  19     |  <0.30            Phosphorous: 2.5      TPro  4.7    /  Alb  2.2    /  TBili  0.1    /  DBili  x      /  AST  21     /  ALT  12     /  AlkPhos  73     28 May 2019 03:31          69 F w/ multiple comorbidities including advanced dementia with severe contractures, large decubitus ulcer, chronic mrsa infection of left hip with a spacer that can not be removed a/w sepsis 2/2 MRSA wound infection vs. aspiration PNA. Initially fluid responsive, but became hypotensive again. Transferred to MICU for pressor support.     #Neuro  -unable to assess, baseline severely contracted and non-verbal, unable to follow commands. Per family, pt is at her baseline (and actually looks the "best she has in a month")  -Had held Seroquel, Clonazepam, gabapentin but family concerned so continued with home medicines.    #Resp  -POCUS w/ MAURA posterior consolidation concerning for PNA. C/w vanco/cefepime/flagyl. ID following  -Vanc restarted, f/u pre-4th trough    #CVS  -Pt never required IV pressor support, but remained on oral midodrine. c/w midodrine 10q8h  -Hold anti-hypertensives for now  -c/w a/c for afib  -Would recommend IVF if hypotensive    #GI  -No active issues  -c/w tube feeds    #Renal  -Cr at baseline, monitor urine output while on IVF.    #Heme  -Leukocytosis likely in setting of infection. abx as below.     #Endo  -Pt reportedly has been on chronic prednisone 7.5mg for ?asthma. Will plan to taper stress dose solu-cortef 100mg q8h to 50q8 tmw. Would recommend taper down to home dose subsequently as long as she remains improving.    #ID  -C/w vanco/cefepime. Repeat blood cx sent. Coag neg staph may be contaminant, but if persistently present may need repeat TTE to r/o endocarditis. 5/26 +BCx GPC in clusters.  -hold flagyl per Dr. Brand  -Vanc trough pre-4th ordered  -ID following. If pt is unimproved, would recommend CT C/A/P    #Ethics  -Full code     #Dispo: Pt is medically optimized for transfer to floors        For Followup:  [ ] Taper steroids - plan for home dose on 5/30 subsequent to a day of 50q8 solumedrol on 5/29  [ ] F/u vanc trough pre-4th and adjust vanc dose  [ ] F/u wound care recs (order placed)  [ ] F/u final BCx from 5/26  [ ] Repeat BCx 5/29 AM to be sent for surveillance given positive cx on 5/26.  [ ] Restart home meds, jaspal anti-HTN if pt's BP remains stable/elevated MICU Transfer Note    Transfer from: MICU    Transfer to: ( x ) Medicine    (  ) Telemetry     (   ) RCU        (    ) Palliative         (   ) Stroke Unit          (   ) __________________    Accepting physician: Dr. Bolden  Signout given to:     MICU COURSE:   The pt was transferred to the MICU on 5/27, improved dramatically with broader abx, PO midodrine and fluids and did not require IV pressor support. The pt had an episode of hypotension to high 70s SBP on 5/27 which was responsive to 500cc of fluids. A POCUS found a MAURA consolidation. Vanc was supratherapeutic so dose was held and restarted on 5/28. Home medications were restarted, the pt's eliquis was changed to 5 BID. The pt was also started on free water through her PEG.    The pt is medically optimized for transfer to the floors.      Vital Signs Last 24 Hrs  T(C): 36.7 (28 May 2019 07:00), Max: 36.9 (27 May 2019 19:00)  T(F): 98 (28 May 2019 07:00), Max: 98.5 (27 May 2019 19:00)  HR: 66 (28 May 2019 10:00) (66 - 104)  BP: 102/53 (28 May 2019 10:00) (93/64 - 144/78)  BP(mean): 76 (28 May 2019 10:00) (69 - 112)  RR: 14 (28 May 2019 10:00) (13 - 48)  SpO2: 100% (28 May 2019 10:00) (74% - 100%)  I&O's Summary    27 May 2019 07:01  -  28 May 2019 07:00  --------------------------------------------------------  IN: 3909 mL / OUT: 3119 mL / NET: 790 mL    28 May 2019 07:01  -  28 May 2019 10:59  --------------------------------------------------------  IN: 774.5 mL / OUT: 100 mL / NET: 674.5 mL        MEDICATIONS  (STANDING):  ALBUTerol    0.083% 2.5 milliGRAM(s) Nebulizer every 6 hours  ALBUTerol    90 MICROgram(s) HFA Inhaler 1 Puff(s) Inhalation every 4 hours  apixaban 5 milliGRAM(s) Oral every 12 hours  ascorbic acid 500 milliGRAM(s) Oral daily  Awake Gel Capsules 20:1 (THC:CBD) 2 Capsule(s) 2 Capsule(s) Oral <User Schedule>  buDESOnide   0.5 milliGRAM(s) Respule 0.5 milliGRAM(s) Inhalation every 12 hours  Calm Drops 50:1 (THC:CBD) 2 milliLiter(s) 2 milliLiter(s) Oral <User Schedule>  cefepime   IVPB 1000 milliGRAM(s) IV Intermittent every 12 hours  chlorhexidine 2% Cloths 1 Application(s) Topical daily  chlorhexidine 4% Liquid 1 Application(s) Topical <User Schedule>  clonazePAM  Tablet 1.5 milliGRAM(s) Oral at bedtime  collagenase Ointment 1 Application(s) Topical daily  diphenoxylate/atropine 1 Tablet(s) Oral daily  famotidine   Suspension 20 milliGRAM(s) Oral daily  gabapentin   Solution 300 milliGRAM(s) Oral once  gabapentin   Solution 300 milliGRAM(s) Oral two times a day  [place] Drops 1:1 THC:CBD 3 milliLiter(s) 3 milliLiter(s) Oral <User Schedule>  hydrocortisone sodium succinate Injectable 100 milliGRAM(s) IV Push every 8 hours  lactobacillus acidophilus 1 Tablet(s) Oral daily  metroNIDAZOLE  IVPB 500 milliGRAM(s) IV Intermittent every 8 hours  midodrine 10 milliGRAM(s) Oral every 8 hours  multivitamin 1 Tablet(s) Oral daily  norepinephrine Infusion 0.05 MICROgram(s)/kG/Min (1.913 mL/Hr) IV Continuous <Continuous>  QUEtiapine 25 milliGRAM(s) Oral at bedtime  tiZANidine 4 milliGRAM(s) Oral <User Schedule>  vancomycin  IVPB      vancomycin  IVPB 750 milliGRAM(s) IV Intermittent every 12 hours      LABS:                                              7.6                   Neurophils% (auto):   x      (05-28 @ 03:31):    12.3 )-----------(273          Lymphocytes% (auto):  x                                             22.6                   Eosinphils% (auto):   x        Manual%: Neutrophils x    ; Lymphocytes x    ; Eosinophils x    ; Bands%: x    ; Blasts x                                    134    |  103    |  24                  Calcium: 8.2   / iCa: x      (05-28 @ 03:31)    ----------------------------<  224       Magnesium: 2.0                              3.7     |  19     |  <0.30            Phosphorous: 2.5      TPro  4.7    /  Alb  2.2    /  TBili  0.1    /  DBili  x      /  AST  21     /  ALT  12     /  AlkPhos  73     28 May 2019 03:31          69 F w/ multiple comorbidities including advanced dementia with severe contractures, large decubitus ulcer, chronic mrsa infection of left hip with a spacer that can not be removed a/w sepsis 2/2 MRSA wound infection vs. aspiration PNA. Initially fluid responsive, but became hypotensive again. Transferred to MICU for pressor support.     #Neuro  -unable to assess, baseline severely contracted and non-verbal, unable to follow commands. Per family, pt is at her baseline (and actually looks the "best she has in a month")  -Had held Seroquel, Clonazepam, gabapentin but family concerned so continued with home medicines.    #Resp  -POCUS w/ MAURA posterior consolidation concerning for PNA. C/w vanco/cefepime/flagyl. ID following  -Vanc restarted, f/u pre-4th trough    #CVS  -Pt never required IV pressor support, but remained on oral midodrine. c/w midodrine 10q8h  -Hold anti-hypertensives for now  -c/w a/c for afib  -Would recommend IVF if hypotensive    #GI  -No active issues  -c/w tube feeds    #Renal  -Cr at baseline, monitor urine output while on IVF.    #Heme  -Leukocytosis likely in setting of infection. abx as below.     #Endo  -Pt reportedly has been on chronic prednisone 7.5mg for ?asthma. Will plan to taper stress dose solu-cortef 100mg q8h to 50q8 tmw. Would recommend taper down to home dose subsequently as long as she remains improving.    #ID  -C/w vanco/cefepime. Repeat blood cx sent. Coag neg staph may be contaminant, but if persistently present may need repeat TTE to r/o endocarditis. 5/26 +BCx GPC in clusters.  -hold flagyl per Dr. Brand  -Vanc trough pre-4th ordered  -ID following. If pt is unimproved, would recommend CT C/A/P    #Ethics  -Full code     #Dispo: Pt is medically optimized for transfer to floors        For Followup:  [ ] Taper steroids - plan for home dose on 5/30 subsequent to a day of 50q8 solumedrol on 5/29  [ ] F/u vanc trough pre-4th and adjust vanc dose  [ ] F/u wound care recs (order placed)  [ ] F/u final BCx from 5/26  [ ] Repeat BCx 5/29 AM to be sent for surveillance given positive cx on 5/26.  [ ] Restart home meds, jaspal anti-HTN if pt's BP remains stable/elevated MICU Transfer Note    Transfer from: MICU    Transfer to: ( x ) Medicine    (  ) Telemetry     (   ) RCU        (    ) Palliative         (   ) Stroke Unit          (   ) __________________    Accepting physician: Dr. Bolden  Signout given to:     MICU COURSE:   The pt was transferred to the MICU on 5/27, improved dramatically with broader abx, PO midodrine and fluids and did not require IV pressor support. The pt had an episode of hypotension to high 70s SBP on 5/27 which was responsive to 500cc of fluids. A POCUS found a MAURA consolidation. Vanc was supratherapeutic so dose was held and restarted on 5/28. Home medications were restarted, the pt's eliquis was changed to 5 BID. The pt was also started on free water through her PEG.    The pt is medically optimized for transfer to the floors.      Vital Signs Last 24 Hrs  T(C): 36.7 (28 May 2019 07:00), Max: 36.9 (27 May 2019 19:00)  T(F): 98 (28 May 2019 07:00), Max: 98.5 (27 May 2019 19:00)  HR: 66 (28 May 2019 10:00) (66 - 104)  BP: 102/53 (28 May 2019 10:00) (93/64 - 144/78)  BP(mean): 76 (28 May 2019 10:00) (69 - 112)  RR: 14 (28 May 2019 10:00) (13 - 48)  SpO2: 100% (28 May 2019 10:00) (74% - 100%)  I&O's Summary    27 May 2019 07:01  -  28 May 2019 07:00  --------------------------------------------------------  IN: 3909 mL / OUT: 3119 mL / NET: 790 mL    28 May 2019 07:01  -  28 May 2019 10:59  --------------------------------------------------------  IN: 774.5 mL / OUT: 100 mL / NET: 674.5 mL        MEDICATIONS  (STANDING):  ALBUTerol    0.083% 2.5 milliGRAM(s) Nebulizer every 6 hours  ALBUTerol    90 MICROgram(s) HFA Inhaler 1 Puff(s) Inhalation every 4 hours  apixaban 5 milliGRAM(s) Oral every 12 hours  ascorbic acid 500 milliGRAM(s) Oral daily  Awake Gel Capsules 20:1 (THC:CBD) 2 Capsule(s) 2 Capsule(s) Oral <User Schedule>  buDESOnide   0.5 milliGRAM(s) Respule 0.5 milliGRAM(s) Inhalation every 12 hours  Calm Drops 50:1 (THC:CBD) 2 milliLiter(s) 2 milliLiter(s) Oral <User Schedule>  cefepime   IVPB 1000 milliGRAM(s) IV Intermittent every 12 hours  chlorhexidine 2% Cloths 1 Application(s) Topical daily  chlorhexidine 4% Liquid 1 Application(s) Topical <User Schedule>  clonazePAM  Tablet 1.5 milliGRAM(s) Oral at bedtime  collagenase Ointment 1 Application(s) Topical daily  diphenoxylate/atropine 1 Tablet(s) Oral daily  famotidine   Suspension 20 milliGRAM(s) Oral daily  gabapentin   Solution 300 milliGRAM(s) Oral once  gabapentin   Solution 300 milliGRAM(s) Oral two times a day  [place] Drops 1:1 THC:CBD 3 milliLiter(s) 3 milliLiter(s) Oral <User Schedule>  hydrocortisone sodium succinate Injectable 100 milliGRAM(s) IV Push every 8 hours  lactobacillus acidophilus 1 Tablet(s) Oral daily  metroNIDAZOLE  IVPB 500 milliGRAM(s) IV Intermittent every 8 hours  midodrine 10 milliGRAM(s) Oral every 8 hours  multivitamin 1 Tablet(s) Oral daily  norepinephrine Infusion 0.05 MICROgram(s)/kG/Min (1.913 mL/Hr) IV Continuous <Continuous>  QUEtiapine 25 milliGRAM(s) Oral at bedtime  tiZANidine 4 milliGRAM(s) Oral <User Schedule>  vancomycin  IVPB      vancomycin  IVPB 750 milliGRAM(s) IV Intermittent every 12 hours      LABS:                                              7.6                   Neurophils% (auto):   x      (05-28 @ 03:31):    12.3 )-----------(273          Lymphocytes% (auto):  x                                             22.6                   Eosinphils% (auto):   x        Manual%: Neutrophils x    ; Lymphocytes x    ; Eosinophils x    ; Bands%: x    ; Blasts x                                    134    |  103    |  24                  Calcium: 8.2   / iCa: x      (05-28 @ 03:31)    ----------------------------<  224       Magnesium: 2.0                              3.7     |  19     |  <0.30            Phosphorous: 2.5      TPro  4.7    /  Alb  2.2    /  TBili  0.1    /  DBili  x      /  AST  21     /  ALT  12     /  AlkPhos  73     28 May 2019 03:31          69 F w/ multiple comorbidities including advanced dementia with severe contractures, large decubitus ulcer, chronic mrsa infection of left hip with a spacer that can not be removed a/w sepsis 2/2 MRSA wound infection vs. aspiration PNA. Initially fluid responsive, but became hypotensive again. Transferred to MICU for pressor support.     #Neuro  -unable to assess, baseline severely contracted and non-verbal, unable to follow commands. Per family, pt is at her baseline (and actually looks the "best she has in a month")  -Had held Seroquel, Clonazepam, gabapentin but family concerned so continued with home medicines.    #Resp  -POCUS w/ MAURA posterior consolidation concerning for PNA. C/w vanco/cefepime/flagyl. ID following  -Vanc restarted, f/u pre-4th trough    #CVS  -Pt never required IV pressor support, but remained on oral midodrine. c/w midodrine 10q8h  -Hold anti-hypertensives for now  -c/w a/c for afib  -Would recommend IVF if hypotensive    #GI  -No active issues  -c/w tube feeds    #Renal  -Cr at baseline, monitor urine output while on IVF.    #Heme  -Leukocytosis likely in setting of infection. abx as below.     #Endo  -Pt reportedly has been on chronic prednisone 7.5mg for ?asthma. Will plan to taper stress dose solu-cortef 100mg q8h to 50q8 tmw. Would recommend taper down to home dose subsequently as long as she remains improving.    #ID  -C/w vanco/cefepime. Repeat blood cx sent. Coag neg staph may be contaminant, but if persistently present may need repeat TTE to r/o endocarditis. 5/26 +BCx GPC in clusters.  -hold flagyl per Dr. Brand  -Vanc trough pre-4th ordered  -ID following. If pt is unimproved, would recommend CT C/A/P    #Ethics  -Full code     #Dispo: Pt is medically optimized for transfer to floors        For Followup:  [ ] Taper steroids - plan for a day of 50q8 solumedrol on 5/29 and home dose subsequently.  [ ] F/u vanc trough pre-4th and adjust vanc dose  [ ] F/u wound care recs (order placed)  [ ] F/u final BCx from 5/26  [ ] Repeat BCx 5/29 AM to be sent for surveillance given positive cx on 5/26.  [ ] Restart home meds, jaspal anti-HTN if pt's BP remains stable/elevated

## 2019-05-28 NOTE — PROGRESS NOTE ADULT - ASSESSMENT
67 yo discharged from the hospital twice after prolong admission related to fever  Advanced dementia with severe contractures , broken leg, and large decubitus  also has an chronic mrsa infection of left hip with a spacer that can not  be removed     Has been treated with courses of vancomycin to suppress the mrsa infection which prior was suspected to be the most likely source of fever (PICC/vanco stopped a week ago)    New fever, ? aspiration pneumonia vs UTI/sacral wound infection/DVT  BCX 1/4 CoNS, contam?  WBC trending down, today's CBC pending     Overall, fever, leukocytosis, aspiration pneumonia, ? allergy to meropenem  - Vanco 750mg q 12 (trough before 4th dose)  - Cefepime 1g q 12  -   discussed with micu and   DNI  hopefully move out of the unit today         After 5pm and on weekends call [phone]

## 2019-05-28 NOTE — CHART NOTE - NSCHARTNOTEFT_GEN_A_CORE
Nutrition Follow Up Note  Patient seen for: malnutrition f/u    Chart reviewed, events noted. Adm dx: PNA. Moderate malnutrition dx on adm.    Source: chart, team    Diet :     Patient reports:     PO intake :     Source for PO intake:     Enteral /Parenteral Nutrition:       Daily Weight in k.6 (05-25)  % Weight Change    Pertinent Medications: MEDICATIONS  (STANDING):  ALBUTerol    0.083% 2.5 milliGRAM(s) Nebulizer every 6 hours  ALBUTerol    90 MICROgram(s) HFA Inhaler 1 Puff(s) Inhalation every 4 hours  apixaban 5 milliGRAM(s) Oral every 12 hours  ascorbic acid 500 milliGRAM(s) Oral daily  Awake Gel Capsules 20:1 (THC:CBD) 2 Capsule(s) 2 Capsule(s) Oral <User Schedule>  buDESOnide   0.5 milliGRAM(s) Respule 0.5 milliGRAM(s) Inhalation every 12 hours  Calm Drops 50:1 (THC:CBD) 2 milliLiter(s) 2 milliLiter(s) Oral <User Schedule>  cefepime   IVPB 1000 milliGRAM(s) IV Intermittent every 12 hours  chlorhexidine 2% Cloths 1 Application(s) Topical daily  chlorhexidine 4% Liquid 1 Application(s) Topical <User Schedule>  clonazePAM  Tablet 1.5 milliGRAM(s) Oral at bedtime  collagenase Ointment 1 Application(s) Topical daily  diphenoxylate/atropine 1 Tablet(s) Oral daily  famotidine   Suspension 20 milliGRAM(s) Oral daily  gabapentin   Solution 300 milliGRAM(s) Oral once  gabapentin   Solution 300 milliGRAM(s) Oral two times a day  [place] Drops 1:1 THC:CBD 3 milliLiter(s) 3 milliLiter(s) Oral <User Schedule>  hydrocortisone sodium succinate Injectable 100 milliGRAM(s) IV Push every 8 hours  lactobacillus acidophilus 1 Tablet(s) Oral daily  metroNIDAZOLE  IVPB 500 milliGRAM(s) IV Intermittent every 8 hours  midodrine 10 milliGRAM(s) Oral every 8 hours  multivitamin 1 Tablet(s) Oral daily  norepinephrine Infusion 0.05 MICROgram(s)/kG/Min (1.913 mL/Hr) IV Continuous <Continuous>  QUEtiapine 25 milliGRAM(s) Oral at bedtime  tiZANidine 4 milliGRAM(s) Oral <User Schedule>  vancomycin  IVPB      vancomycin  IVPB 750 milliGRAM(s) IV Intermittent every 12 hours    MEDICATIONS  (PRN):     @ 03:31: Na 134<L>, BUN 24<H>, Cr <0.30<L>, <H>, K+ 3.7, Phos 2.5, Mg 2.0, Alk Phos 73, ALT/SGPT 12, AST/SGOT 21, HbA1c --    Finger Sticks:  POCT Blood Glucose.: 127 mg/dL ( @ 18:45)      Skin per nursing documentation:   Edema:    Estimated Needs:   [ ] no change since previous assessment  [ ] recalculated:     Previous Nutrition Diagnosis:   Nutrition Diagnosis is:    New Nutrition Diagnosis:  Related to:    As evidenced by:      Interventions:     Recommend  1)    Monitoring and Evaluation:     Continue to monitor Nutritional intake, Tolerance to diet prescription, weights, labs, skin integrity    RD remains available upon request and will follow up per protocol Nutrition Follow Up Note  Patient seen for: malnutrition f/u    Chart reviewed, events noted. Adm dx: PNA. Moderate malnutrition dx on adm.    Source: chart, team    Diet :   Glucerna 1.2 240cc (1 can = 237cc) q 6 hr, No Carb Prosource TF  2 times daily via PEG     Enteral /Parenteral Nutrition: goal 948cc/day  24 hr EN intake  709cc,  720cc,  960cc  meeting 84% goal over past 3 days      Daily Weight in k.6 (-)   wt 46.8 kg (? wt discrepancy)  has 1+ generalized, B/L hand edema       Pertinent Medications: MEDICATIONS  (STANDING):  ALBUTerol    0.083% 2.5 milliGRAM(s) Nebulizer every 6 hours  ALBUTerol    90 MICROgram(s) HFA Inhaler 1 Puff(s) Inhalation every 4 hours  apixaban 5 milliGRAM(s) Oral every 12 hours  ascorbic acid 500 milliGRAM(s) Oral daily  Awake Gel Capsules 20:1 (THC:CBD) 2 Capsule(s) 2 Capsule(s) Oral <User Schedule>  buDESOnide   0.5 milliGRAM(s) Respule 0.5 milliGRAM(s) Inhalation every 12 hours  Calm Drops 50:1 (THC:CBD) 2 milliLiter(s) 2 milliLiter(s) Oral <User Schedule>  cefepime   IVPB 1000 milliGRAM(s) IV Intermittent every 12 hours  chlorhexidine 2% Cloths 1 Application(s) Topical daily  chlorhexidine 4% Liquid 1 Application(s) Topical <User Schedule>  clonazePAM  Tablet 1.5 milliGRAM(s) Oral at bedtime  collagenase Ointment 1 Application(s) Topical daily  diphenoxylate/atropine 1 Tablet(s) Oral daily  famotidine   Suspension 20 milliGRAM(s) Oral daily  gabapentin   Solution 300 milliGRAM(s) Oral once  gabapentin   Solution 300 milliGRAM(s) Oral two times a day  [place] Drops 1:1 THC:CBD 3 milliLiter(s) 3 milliLiter(s) Oral <User Schedule>  hydrocortisone sodium succinate Injectable 100 milliGRAM(s) IV Push every 8 hours  lactobacillus acidophilus 1 Tablet(s) Oral daily  metroNIDAZOLE  IVPB 500 milliGRAM(s) IV Intermittent every 8 hours  midodrine 10 milliGRAM(s) Oral every 8 hours  multivitamin 1 Tablet(s) Oral daily  norepinephrine Infusion 0.05 MICROgram(s)/kG/Min (1.913 mL/Hr) IV Continuous <Continuous>  QUEtiapine 25 milliGRAM(s) Oral at bedtime  tiZANidine 4 milliGRAM(s) Oral <User Schedule>  vancomycin  IVPB      vancomycin  IVPB 750 milliGRAM(s) IV Intermittent every 12 hours    MEDICATIONS  (PRN):     @ 03:31: Na 134<L>, BUN 24<H>, Cr <0.30<L>, <H>, K+ 3.7, Phos 2.5, Mg 2.0, Alk Phos 73, ALT/SGPT 12, AST/SGOT 21, HbA1c --    Finger Sticks:  POCT Blood Glucose.: 127 mg/dL ( @ 18:45)      Skin per nursing documentation: unstageable left thoracic spine, mid thoracic spine, Left malleolus, DTI left heel, stage 4 sacrum      Estimated Needs:   [x ] no change since previous assessment: 7388-7978 kcals, 75-84 gm protein  [ ] recalculated:     Previous Nutrition Diagnosis: moderate malnutrition  Nutrition Diagnosis is: ongoing, addressed w/ EN    New Nutrition Diagnosis: none  Related to:    As evidenced by:      Interventions:     Recommend  1) Continue Glucerna 1.2 1 can q 6 hr (1137 kcals, 57 gm protein), 2 packets of No Carb Prosource (together provides 1257cal, 87 Gm Prot; 31cal/kg based on wt of 40.8kg and 2 Gm Prot/kg based on wt of 42.1 kg).  2) continue multivitamin   3) recommend vitamin C      Monitoring and Evaluation:     Continue to monitor Nutritional intake, Tolerance to diet prescription, weights, labs, skin integrity    RD remains available upon request and will follow up per protocol Nutrition Follow Up Note  Patient seen for: malnutrition f/u    Chart reviewed, events noted. Adm dx: PNA. Moderate malnutrition dx on adm.  Noted pt has h/o intolerance to Iron and Banatrol has had no effect on diarrhea.    Source: chart, team    Diet :   Glucerna 1.2 240cc (1 can = 237cc) q 6 hr, No Carb Prosource TF  2 times daily via PEG     Enteral /Parenteral Nutrition: goal 948cc/day  24 hr EN intake  709cc,  720cc,  960cc  meeting 84% goal over past 3 days    GI:  no vomiting, no abd distention, had liquid BM today.      Daily Weight in k.6 (-25)   wt 46.8 kg (? wt discrepancy)  has 1+ generalized, B/L hand edema       Pertinent Medications: MEDICATIONS  (STANDING):  ALBUTerol    0.083% 2.5 milliGRAM(s) Nebulizer every 6 hours  ALBUTerol    90 MICROgram(s) HFA Inhaler 1 Puff(s) Inhalation every 4 hours  apixaban 5 milliGRAM(s) Oral every 12 hours  ascorbic acid 500 milliGRAM(s) Oral daily  [place]ake Gel Capsules 20:1 (THC:CBD) 2 Capsule(s) 2 Capsule(s) Oral <User Schedule>  buDESOnide   0.5 milliGRAM(s) Respule 0.5 milliGRAM(s) Inhalation every 12 hours  Calm Drops 50:1 (THC:CBD) 2 milliLiter(s) 2 milliLiter(s) Oral <User Schedule>  cefepime   IVPB 1000 milliGRAM(s) IV Intermittent every 12 hours  chlorhexidine 2% Cloths 1 Application(s) Topical daily  chlorhexidine 4% Liquid 1 Application(s) Topical <User Schedule>  clonazePAM  Tablet 1.5 milliGRAM(s) Oral at bedtime  collagenase Ointment 1 Application(s) Topical daily  diphenoxylate/atropine 1 Tablet(s) Oral daily  famotidine   Suspension 20 milliGRAM(s) Oral daily  gabapentin   Solution 300 milliGRAM(s) Oral once  gabapentin   Solution 300 milliGRAM(s) Oral two times a day  [place] Drops 1:1 THC:CBD 3 milliLiter(s) 3 milliLiter(s) Oral <User Schedule>  hydrocortisone sodium succinate Injectable 100 milliGRAM(s) IV Push every 8 hours  lactobacillus acidophilus 1 Tablet(s) Oral daily  metroNIDAZOLE  IVPB 500 milliGRAM(s) IV Intermittent every 8 hours  midodrine 10 milliGRAM(s) Oral every 8 hours  multivitamin 1 Tablet(s) Oral daily  norepinephrine Infusion 0.05 MICROgram(s)/kG/Min (1.913 mL/Hr) IV Continuous <Continuous>  QUEtiapine 25 milliGRAM(s) Oral at bedtime  tiZANidine 4 milliGRAM(s) Oral <User Schedule>  vancomycin  IVPB      vancomycin  IVPB 750 milliGRAM(s) IV Intermittent every 12 hours    MEDICATIONS  (PRN):     @ 03:31: Na 134<L>, BUN 24<H>, Cr <0.30<L>, <H>, K+ 3.7, Phos 2.5, Mg 2.0, Alk Phos 73, ALT/SGPT 12, AST/SGOT 21, HbA1c --    Finger Sticks:  POCT Blood Glucose.: 127 mg/dL ( @ 18:45)      Skin per nursing documentation: unstageable left thoracic spine, mid thoracic spine, Left malleolus, DTI left heel, stage 4 sacrum      Estimated Needs:   [x ] no change since previous assessment: 0550-0499 kcals, 75-84 gm protein  [ ] recalculated:     Previous Nutrition Diagnosis: moderate malnutrition  Nutrition Diagnosis is: ongoing, addressed w/ EN    New Nutrition Diagnosis: none  Related to:    As evidenced by:      Interventions:     Recommend  1) Continue Glucerna 1.2 1 can q 6 hr (1137 kcals, 57 gm protein), 2 packets of No Carb Prosource (together provides 1257cal, 87 Gm Prot; 31cal/kg based on wt of 40.8kg and 2 Gm Prot/kg based on wt of 42.1 kg).  2) continue multivitamin   3) recommend vitamin C      Monitoring and Evaluation:     Continue to monitor Nutritional intake, Tolerance to diet prescription, weights, labs, skin integrity    RD remains available upon request and will follow up per protocol

## 2019-05-28 NOTE — PROGRESS NOTE ADULT - ASSESSMENT
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 aspiration PNA. Initially fluid responsive, but became hypotensive again. Now transferred to MICU for pressor support.     #Neuro  -unable to assess, baseline severely contracted and non-verbal, unable to follow commands. Per family, pt is at her baseline.  -Had held olanzapine and lorazepam but family concerned so continued with home medicines.    #Resp  -POCUS w/ MAURA posterior consolidation concerning for PNA. C/w vanco/cefepime/flagyl. ID following  - Vanc to be restarted once level < 15 (15.7 this AM).    #CVS  -Pt never required IV pressor support, but remained on oral midodrine. c/w midodrine 10q8h  -Hold anti-hypertensives for now  -c/w a/c for afib    #GI  -No active issues  -c/w tube feeds    #Renal  -Cr at baseline, monitor urine output while on IVF.     #Heme  -Leukocytosis likely in setting of infection. abx as below.     #Endo  -Pt reportedly has been on chronic prednisone 7.5mg for ?asthma. Will plan to taper stress dose solu-cortef 100mg q8h.    #ID  -C/w vanco/cefepime. Repeat blood cx sent. Coag neg staph may be contaminant, but if persistently present may need repeat TTE to r/o endocarditis  -c/w flagyl for now for anaerobic coverage.  -ID following. 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 aspiration PNA. Initially fluid responsive, but became hypotensive again. Now transferred to MICU for pressor support.     #Neuro  -unable to assess, baseline severely contracted and non-verbal, unable to follow commands. Per family, pt is at her baseline.  -Had held olanzapine and lorazepam but family concerned so continued with home medicines.    #Resp  -POCUS w/ MAURA posterior consolidation concerning for PNA. C/w vanco/cefepime/flagyl. ID following  - Vanc to be restarted once level < 15 (15.7 this AM).    #CVS  -Pt never required IV pressor support, but remained on oral midodrine. c/w midodrine 10q8h  -Hold anti-hypertensives for now  -c/w a/c for afib    #GI  -No active issues  -c/w tube feeds    #Renal  -Cr at baseline, monitor urine output while on IVF.     #Heme  -Leukocytosis likely in setting of infection. abx as below.     #Endo  -Pt reportedly has been on chronic prednisone 7.5mg for ?asthma. Will plan to taper stress dose solu-cortef 100mg q8h.    #ID  -C/w vanco/cefepime. Repeat blood cx sent. Coag neg staph may be contaminant, but if persistently present may need repeat TTE to r/o endocarditis  -c/w flagyl for now for anaerobic coverage.  -Vanc trough to be sent  -ID following. 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 aspiration PNA. Initially fluid responsive, but became hypotensive again. Now transferred to MICU for pressor support.     #Neuro  -unable to assess, baseline severely contracted and non-verbal, unable to follow commands. Per family, pt is at her baseline.  -Had held olanzapine and lorazepam but family concerned so continued with home medicines.    #Resp  -POCUS w/ MAURA posterior consolidation concerning for PNA. C/w vanco/cefepime/flagyl. ID following  - Vanc to be restarted once level < 15 (15.7 this AM).    #CVS  -Pt never required IV pressor support, but remained on oral midodrine. c/w midodrine 10q8h  -Hold anti-hypertensives for now  -c/w a/c for afib    #GI  -No active issues  -c/w tube feeds    #Renal  -Cr at baseline, monitor urine output while on IVF.     #Heme  -Leukocytosis likely in setting of infection. abx as below.     #Endo  -Pt reportedly has been on chronic prednisone 7.5mg for ?asthma. Will plan to taper stress dose solu-cortef 100mg q8h.    #ID  -C/w vanco/cefepime. Repeat blood cx sent. Coag neg staph may be contaminant, but if persistently present may need repeat TTE to r/o endocarditis  -c/w flagyl for now for anaerobic coverage.  -Vanc trough to be sent  -ID following. 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 aspiration PNA. Initially fluid responsive, but became hypotensive again. Now transferred to MICU for pressor support.     #Neuro  -unable to assess, baseline severely contracted and non-verbal, unable to follow commands. Per family, pt is at her baseline.  -Had held olanzapine and lorazepam but family concerned so continued with home medicines.    #Resp  -POCUS w/ MAURA posterior consolidation concerning for PNA. C/w vanco/cefepime/flagyl. ID following  - Vanc to be restarted once level < 15 (15.7 this AM).    #CVS  -Pt never required IV pressor support, but remained on oral midodrine. c/w midodrine 10q8h  -Hold anti-hypertensives for now  -c/w a/c for afib    #GI  -No active issues  -c/w tube feeds    #Renal  -Cr at baseline, monitor urine output while on IVF.     #Heme  -Leukocytosis likely in setting of infection. abx as below.     #Endo  -Pt reportedly has been on chronic prednisone 7.5mg for ?asthma. Will plan to taper stress dose solu-cortef 100mg q8h.    #ID  -C/w vanco/cefepime. Repeat blood cx sent. Coag neg staph may be contaminant, but if persistently present may need repeat TTE to r/o endocarditis  -hold flagyl per Dr. Brand.  -Vanc trough 15.6, starting 750q12  -ID following. If pt is unimproved, would get CT C/A/P    #Ethics  -Full code     #Dispo: Pt is medically optimized for transfer to floors

## 2019-05-28 NOTE — PROGRESS NOTE ADULT - SUBJECTIVE AND OBJECTIVE BOX
Seamsu Evangelista  [phone]    CHIEF COMPLAINT: fever, SOB    Interval Events: Yesterday pt was hypotensive, given 500cc bolus and BPs improved and stabilized. Overnight events     REVIEW OF SYSTEMS:  Constitutional: [ ] negative [ ] fevers [ ] chills [ ] weight loss [ ] weight gain  HEENT: [ ] negative [ ] dry eyes [ ] eye irritation [ ] postnasal drip [ ] nasal congestion  CV: [ ] negative  [ ] chest pain [ ] orthopnea [ ] palpitations [ ] murmur  Resp: [ ] negative [ ] cough [ ] shortness of breath [ ] dyspnea [ ] wheezing [ ] sputum [ ] hemoptysis  GI: [ ] negative [ ] nausea [ ] vomiting [ ] diarrhea [ ] constipation [ ] abd pain [ ] dysphagia   : [ ] negative [ ] dysuria [ ] nocturia [ ] hematuria [ ] increased urinary frequency  Musculoskeletal: [ ] negative [ ] back pain [ ] myalgias [ ] arthralgias [ ] fracture  Skin: [ ] negative [ ] rash [ ] itch  Neurological: [ ] negative [ ] headache [ ] dizziness [ ] syncope [ ] weakness [ ] numbness  Psychiatric: [ ] negative [ ] anxiety [ ] depression  Endocrine: [ ] negative [ ] diabetes [ ] thyroid problem  Hematologic/Lymphatic: [ ] negative [ ] anemia [ ] bleeding problem  Allergic/Immunologic: [ ] negative [ ] itchy eyes [ ] nasal discharge [ ] hives [ ] angioedema  [ ] All other systems negative  [x] Unable to assess ROS because non-verbal    OBJECTIVE:  ICU Vital Signs Last 24 Hrs  T(C): 36.9 (28 May 2019 03:00), Max: 36.9 (27 May 2019 08:00)  T(F): 98.4 (28 May 2019 03:00), Max: 98.5 (27 May 2019 19:00)  HR: 104 (28 May 2019 06:00) (73 - 104)  BP: 144/78 (28 May 2019 06:00) (78/44 - 144/78)  BP(mean): 112 (28 May 2019 06:00) (56 - 112)  ABP: --  ABP(mean): --  RR: 48 (28 May 2019 06:00) (13 - 48)  SpO2: 74% (28 May 2019 06:00) (74% - 100%)      05-26 @ 07:01  -  05-27 @ 07:00  --------------------------------------------------------  IN: 2020 mL / OUT: 1000 mL / NET: 1020 mL    05-27 @ 07:01  - 05-28 @ 06:56  --------------------------------------------------------  IN: 3909 mL / OUT: 3019 mL / NET: 890 mL      CAPILLARY BLOOD GLUCOSE  POCT Blood Glucose.: 127 mg/dL (27 May 2019 18:45)      PHYSICAL EXAM:  General: NAD, laying comfortably  HEENT: NCAT, clear conjunctiva  CARDIAC: Normal S1 and S2, tachy  LUNGS: Clear to auscultation without rales, rhonchi, wheezing or diminished breath sounds anteriorly   ABDOMEN: Soft, nondistended, nontender. No guarding or rebound. +PEG  MUSKULOSKELETAL: No joint erythema or tenderness.   EXTREMITIES: Trace LE edema   NEUROLOGICAL: +contracted  SKIN: +chronic ulcers  PSYCHIATRIC: AOx0, non-verbal.   : +[facility] MEDICATIONS:  apixaban 2.5 milliGRAM(s) Oral every 12 hours  cefepime   IVPB 1000 milliGRAM(s) IV Intermittent every 12 hours  metroNIDAZOLE  IVPB 500 milliGRAM(s) IV Intermittent every 8 hours  midodrine 10 milliGRAM(s) Oral every 8 hours  hydrocortisone sodium succinate Injectable 100 milliGRAM(s) IV Push every 8 hours  ALBUTerol    0.083% 2.5 milliGRAM(s) Nebulizer every 6 hours  ALBUTerol    90 MICROgram(s) HFA Inhaler 1 Puff(s) Inhalation every 4 hours  buDESOnide   0.5 milliGRAM(s) Respule 0.5 milliGRAM(s) Inhalation every 12 hours  clonazePAM  Tablet 1.5 milliGRAM(s) Oral at bedtime  QUEtiapine 25 milliGRAM(s) Oral at bedtime  tiZANidine 4 milliGRAM(s) Oral <User Schedule>  diphenoxylate/atropine 1 Tablet(s) Oral daily  famotidine   Suspension 20 milliGRAM(s) Oral daily  ascorbic acid 500 milliGRAM(s) Oral daily  multivitamin 1 Tablet(s) Oral daily  sodium chloride 0.9%. 1000 milliLiter(s) IV Continuous <Continuous>  chlorhexidine 2% Cloths 1 Application(s) Topical daily  chlorhexidine 4% Liquid 1 Application(s) Topical <User Schedule>  collagenase Ointment 1 Application(s) Topical daily  Awake Gel Capsules 20:1 (THC:CBD) 2 Capsule(s) 2 Capsule(s) Oral <User Schedule>  Calm Drops 50:1 (THC:CBD) 2 milliLiter(s) 2 milliLiter(s) Oral <User Schedule>  [place] Drops 1:1 THC:CBD 3 milliLiter(s) 3 milliLiter(s) Oral <User Schedule>  lactobacillus acidophilus 1 Tablet(s) Oral daily      LABS:                        7.6    12.3  )-----------( 273      ( 28 May 2019 03:31 )             22.6     Hgb Trend: 7.6<--, 8.5<--, 8.2<--, 10.2<--, 10.0<--  05-28    134<L>  |  103  |  24<H>  ----------------------------<  224<H>  3.7   |  19<L>  |  <0.30<L>    Ca    8.2<L>      28 May 2019 03:31  Phos  2.5     05-28  Mg     2.0     05-28    TPro  4.7<L>  /  Alb  2.2<L>  /  TBili  0.1<L>  /  DBili  x   /  AST  21  /  ALT  12  /  AlkPhos  73  05-28    Creatinine Trend: <0.30<--, 0.34<--, 0.38<--, 0.34<--, 0.41<--, <0.30<--    PT/INR - ( 27 May 2019 01:25 )   PT: 16.0 sec;   INR: 1.38 ratio       PTT - ( 27 May 2019 01:25 )  PTT:27.6 sec    Arterial Blood Gas:  05-27 @ 01:18  7.50/30/111/24/100/1.1  ABG lactate: -- Seamus Evangelista  [phone]    CHIEF COMPLAINT: fever, SOB    Interval Events: Yesterday pt was hypotensive, given 500cc bolus and BPs improved and stabilized. No acute overnight events. Pt's family upset as pt was sundowning due to not receiving her klonopin and seroquel.    REVIEW OF SYSTEMS:  Constitutional: [ ] negative [ ] fevers [ ] chills [ ] weight loss [ ] weight gain  HEENT: [ ] negative [ ] dry eyes [ ] eye irritation [ ] postnasal drip [ ] nasal congestion  CV: [ ] negative  [ ] chest pain [ ] orthopnea [ ] palpitations [ ] murmur  Resp: [ ] negative [ ] cough [ ] shortness of breath [ ] dyspnea [ ] wheezing [ ] sputum [ ] hemoptysis  GI: [ ] negative [ ] nausea [ ] vomiting [ ] diarrhea [ ] constipation [ ] abd pain [ ] dysphagia   : [ ] negative [ ] dysuria [ ] nocturia [ ] hematuria [ ] increased urinary frequency  Musculoskeletal: [ ] negative [ ] back pain [ ] myalgias [ ] arthralgias [ ] fracture  Skin: [ ] negative [ ] rash [ ] itch  Neurological: [ ] negative [ ] headache [ ] dizziness [ ] syncope [ ] weakness [ ] numbness  Psychiatric: [ ] negative [ ] anxiety [ ] depression  Endocrine: [ ] negative [ ] diabetes [ ] thyroid problem  Hematologic/Lymphatic: [ ] negative [ ] anemia [ ] bleeding problem  Allergic/Immunologic: [ ] negative [ ] itchy eyes [ ] nasal discharge [ ] hives [ ] angioedema  [ ] All other systems negative  [x] Unable to assess ROS because non-verbal    OBJECTIVE:  ICU Vital Signs Last 24 Hrs  T(C): 36.9 (28 May 2019 03:00), Max: 36.9 (27 May 2019 08:00)  T(F): 98.4 (28 May 2019 03:00), Max: 98.5 (27 May 2019 19:00)  HR: 104 (28 May 2019 06:00) (73 - 104)  BP: 144/78 (28 May 2019 06:00) (78/44 - 144/78)  BP(mean): 112 (28 May 2019 06:00) (56 - 112)  ABP: --  ABP(mean): --  RR: 48 (28 May 2019 06:00) (13 - 48)  SpO2: 74% (28 May 2019 06:00) (74% - 100%)      05-26 @ 07:01 - 05-27 @ 07:00  --------------------------------------------------------  IN: 2020 mL / OUT: 1000 mL / NET: 1020 mL    05-27 @ 07:01 - 05-28 @ 06:56  --------------------------------------------------------  IN: 3909 mL / OUT: 3019 mL / NET: 890 mL      CAPILLARY BLOOD GLUCOSE  POCT Blood Glucose.: 127 mg/dL (27 May 2019 18:45)      PHYSICAL EXAM:  General: NAD, laying comfortably  HEENT: NCAT, clear conjunctiva  CARDIAC: Normal S1 and S2, tachy  LUNGS: Clear to auscultation without rales, rhonchi, wheezing or diminished breath sounds anteriorly   ABDOMEN: Soft, nondistended, nontender. No guarding or rebound. +PEG  MUSKULOSKELETAL: No joint erythema or tenderness.   EXTREMITIES: Trace LE edema   NEUROLOGICAL: +contracted  SKIN: +chronic ulcers  PSYCHIATRIC: AOx0, non-verbal.   : +[facility] MEDICATIONS:  apixaban 2.5 milliGRAM(s) Oral every 12 hours  cefepime   IVPB 1000 milliGRAM(s) IV Intermittent every 12 hours  metroNIDAZOLE  IVPB 500 milliGRAM(s) IV Intermittent every 8 hours  midodrine 10 milliGRAM(s) Oral every 8 hours  hydrocortisone sodium succinate Injectable 100 milliGRAM(s) IV Push every 8 hours  ALBUTerol    0.083% 2.5 milliGRAM(s) Nebulizer every 6 hours  ALBUTerol    90 MICROgram(s) HFA Inhaler 1 Puff(s) Inhalation every 4 hours  buDESOnide   0.5 milliGRAM(s) Respule 0.5 milliGRAM(s) Inhalation every 12 hours  clonazePAM  Tablet 1.5 milliGRAM(s) Oral at bedtime  QUEtiapine 25 milliGRAM(s) Oral at bedtime  tiZANidine 4 milliGRAM(s) Oral <User Schedule>  diphenoxylate/atropine 1 Tablet(s) Oral daily  famotidine   Suspension 20 milliGRAM(s) Oral daily  ascorbic acid 500 milliGRAM(s) Oral daily  multivitamin 1 Tablet(s) Oral daily  sodium chloride 0.9%. 1000 milliLiter(s) IV Continuous <Continuous>  chlorhexidine 2% Cloths 1 Application(s) Topical daily  chlorhexidine 4% Liquid 1 Application(s) Topical <User Schedule>  collagenase Ointment 1 Application(s) Topical daily  Awake Gel Capsules 20:1 (THC:CBD) 2 Capsule(s) 2 Capsule(s) Oral <User Schedule>  Calm Drops 50:1 (THC:CBD) 2 milliLiter(s) 2 milliLiter(s) Oral <User Schedule>  [place] Drops 1:1 THC:CBD 3 milliLiter(s) 3 milliLiter(s) Oral <User Schedule>  lactobacillus acidophilus 1 Tablet(s) Oral daily      LABS:                        7.6    12.3  )-----------( 273      ( 28 May 2019 03:31 )             22.6     Hgb Trend: 7.6<--, 8.5<--, 8.2<--, 10.2<--, 10.0<--  05-28    134<L>  |  103  |  24<H>  ----------------------------<  224<H>  3.7   |  19<L>  |  <0.30<L>    Ca    8.2<L>      28 May 2019 03:31  Phos  2.5     05-28  Mg     2.0     05-28    TPro  4.7<L>  /  Alb  2.2<L>  /  TBili  0.1<L>  /  DBili  x   /  AST  21  /  ALT  12  /  AlkPhos  73  05-28    Creatinine Trend: <0.30<--, 0.34<--, 0.38<--, 0.34<--, 0.41<--, <0.30<--    PT/INR - ( 27 May 2019 01:25 )   PT: 16.0 sec;   INR: 1.38 ratio       PTT - ( 27 May 2019 01:25 )  PTT:27.6 sec    Arterial Blood Gas:  05-27 @ 01:18  7.50/30/111/24/100/1.1  ABG lactate: --

## 2019-05-29 ENCOUNTER — TRANSCRIPTION ENCOUNTER (OUTPATIENT)
Age: 69
End: 2019-05-29

## 2019-05-29 LAB
ALBUMIN SERPL ELPH-MCNC: 2.7 G/DL — LOW (ref 3.3–5)
ALP SERPL-CCNC: 74 U/L — SIGNIFICANT CHANGE UP (ref 40–120)
ALT FLD-CCNC: 15 U/L — SIGNIFICANT CHANGE UP (ref 10–45)
ANION GAP SERPL CALC-SCNC: 12 MMOL/L — SIGNIFICANT CHANGE UP (ref 5–17)
AST SERPL-CCNC: 14 U/L — SIGNIFICANT CHANGE UP (ref 10–40)
BASOPHILS # BLD AUTO: 0.01 K/UL — SIGNIFICANT CHANGE UP (ref 0–0.2)
BASOPHILS NFR BLD AUTO: 0.1 % — SIGNIFICANT CHANGE UP (ref 0–2)
BILIRUB SERPL-MCNC: 0.1 MG/DL — LOW (ref 0.2–1.2)
BLD GP AB SCN SERPL QL: NEGATIVE — SIGNIFICANT CHANGE UP
BUN SERPL-MCNC: 21 MG/DL — SIGNIFICANT CHANGE UP (ref 7–23)
CALCIUM SERPL-MCNC: 8.7 MG/DL — SIGNIFICANT CHANGE UP (ref 8.4–10.5)
CHLORIDE SERPL-SCNC: 103 MMOL/L — SIGNIFICANT CHANGE UP (ref 96–108)
CO2 SERPL-SCNC: 23 MMOL/L — SIGNIFICANT CHANGE UP (ref 22–31)
CREAT SERPL-MCNC: <0.3 MG/DL — LOW (ref 0.5–1.3)
CULTURE RESULTS: SIGNIFICANT CHANGE UP
EOSINOPHIL # BLD AUTO: 0.02 K/UL — SIGNIFICANT CHANGE UP (ref 0–0.5)
EOSINOPHIL NFR BLD AUTO: 0.2 % — SIGNIFICANT CHANGE UP (ref 0–6)
GLUCOSE SERPL-MCNC: 217 MG/DL — HIGH (ref 70–99)
HCT VFR BLD CALC: 24.8 % — LOW (ref 34.5–45)
HGB BLD-MCNC: 7.7 G/DL — LOW (ref 11.5–15.5)
IMM GRANULOCYTES NFR BLD AUTO: 1.2 % — SIGNIFICANT CHANGE UP (ref 0–1.5)
LYMPHOCYTES # BLD AUTO: 0.59 K/UL — LOW (ref 1–3.3)
LYMPHOCYTES # BLD AUTO: 5.7 % — LOW (ref 13–44)
MAGNESIUM SERPL-MCNC: 1.7 MG/DL — SIGNIFICANT CHANGE UP (ref 1.6–2.6)
MCHC RBC-ENTMCNC: 29.6 PG — SIGNIFICANT CHANGE UP (ref 27–34)
MCHC RBC-ENTMCNC: 31 GM/DL — LOW (ref 32–36)
MCV RBC AUTO: 95.4 FL — SIGNIFICANT CHANGE UP (ref 80–100)
MONOCYTES # BLD AUTO: 0.49 K/UL — SIGNIFICANT CHANGE UP (ref 0–0.9)
MONOCYTES NFR BLD AUTO: 4.7 % — SIGNIFICANT CHANGE UP (ref 2–14)
NEUTROPHILS # BLD AUTO: 9.09 K/UL — HIGH (ref 1.8–7.4)
NEUTROPHILS NFR BLD AUTO: 88.1 % — HIGH (ref 43–77)
PHOSPHATE SERPL-MCNC: 2 MG/DL — LOW (ref 2.5–4.5)
PLATELET # BLD AUTO: 259 K/UL — SIGNIFICANT CHANGE UP (ref 150–400)
POTASSIUM SERPL-MCNC: 3 MMOL/L — LOW (ref 3.5–5.3)
POTASSIUM SERPL-SCNC: 3 MMOL/L — LOW (ref 3.5–5.3)
PROT SERPL-MCNC: 5.1 G/DL — LOW (ref 6–8.3)
RBC # BLD: 2.6 M/UL — LOW (ref 3.8–5.2)
RBC # BLD: 2.6 M/UL — LOW (ref 3.8–5.2)
RBC # FLD: 15.4 % — HIGH (ref 10.3–14.5)
RETICS #: 48.8 K/UL — SIGNIFICANT CHANGE UP (ref 25–125)
RETICS/RBC NFR: 1.9 % — SIGNIFICANT CHANGE UP (ref 0.5–2.5)
RH IG SCN BLD-IMP: NEGATIVE — SIGNIFICANT CHANGE UP
SODIUM SERPL-SCNC: 138 MMOL/L — SIGNIFICANT CHANGE UP (ref 135–145)
SPECIMEN SOURCE: SIGNIFICANT CHANGE UP
VANCOMYCIN TROUGH SERPL-MCNC: 24.9 UG/ML — HIGH (ref 10–20)
WBC # BLD: 10.32 K/UL — SIGNIFICANT CHANGE UP (ref 3.8–10.5)
WBC # FLD AUTO: 10.32 K/UL — SIGNIFICANT CHANGE UP (ref 3.8–10.5)

## 2019-05-29 PROCEDURE — 99232 SBSQ HOSP IP/OBS MODERATE 35: CPT

## 2019-05-29 RX ORDER — VANCOMYCIN HCL 1 G
500 VIAL (EA) INTRAVENOUS EVERY 12 HOURS
Refills: 0 | Status: DISCONTINUED | OUTPATIENT
Start: 2019-05-29 | End: 2019-05-30

## 2019-05-29 RX ORDER — POTASSIUM CHLORIDE 20 MEQ
20 PACKET (EA) ORAL
Refills: 0 | Status: COMPLETED | OUTPATIENT
Start: 2019-05-29 | End: 2019-05-29

## 2019-05-29 RX ADMIN — MIDODRINE HYDROCHLORIDE 10 MILLIGRAM(S): 2.5 TABLET ORAL at 22:49

## 2019-05-29 RX ADMIN — ALBUTEROL 2.5 MILLIGRAM(S): 90 AEROSOL, METERED ORAL at 06:36

## 2019-05-29 RX ADMIN — Medication 500 MILLIGRAM(S): at 12:36

## 2019-05-29 RX ADMIN — CHLORHEXIDINE GLUCONATE 1 APPLICATION(S): 213 SOLUTION TOPICAL at 12:42

## 2019-05-29 RX ADMIN — ALBUTEROL 2.5 MILLIGRAM(S): 90 AEROSOL, METERED ORAL at 12:36

## 2019-05-29 RX ADMIN — Medication 0.5 MILLIGRAM(S): at 17:24

## 2019-05-29 RX ADMIN — Medication 1.5 MILLIGRAM(S): at 22:50

## 2019-05-29 RX ADMIN — Medication 50 MILLIGRAM(S): at 12:45

## 2019-05-29 RX ADMIN — Medication 1 APPLICATION(S): at 12:36

## 2019-05-29 RX ADMIN — TIZANIDINE 4 MILLIGRAM(S): 4 TABLET ORAL at 09:25

## 2019-05-29 RX ADMIN — QUETIAPINE FUMARATE 25 MILLIGRAM(S): 200 TABLET, FILM COATED ORAL at 22:50

## 2019-05-29 RX ADMIN — GABAPENTIN 300 MILLIGRAM(S): 400 CAPSULE ORAL at 06:36

## 2019-05-29 RX ADMIN — Medication 20 MILLIEQUIVALENT(S): at 16:59

## 2019-05-29 RX ADMIN — Medication 0.5 MILLIGRAM(S): at 07:36

## 2019-05-29 RX ADMIN — CEFEPIME 100 MILLIGRAM(S): 1 INJECTION, POWDER, FOR SOLUTION INTRAMUSCULAR; INTRAVENOUS at 06:38

## 2019-05-29 RX ADMIN — CEFEPIME 100 MILLIGRAM(S): 1 INJECTION, POWDER, FOR SOLUTION INTRAMUSCULAR; INTRAVENOUS at 17:24

## 2019-05-29 RX ADMIN — ALBUTEROL 2.5 MILLIGRAM(S): 90 AEROSOL, METERED ORAL at 17:23

## 2019-05-29 RX ADMIN — Medication 50 MILLIGRAM(S): at 22:49

## 2019-05-29 RX ADMIN — MIDODRINE HYDROCHLORIDE 10 MILLIGRAM(S): 2.5 TABLET ORAL at 06:47

## 2019-05-29 RX ADMIN — TIZANIDINE 4 MILLIGRAM(S): 4 TABLET ORAL at 21:40

## 2019-05-29 RX ADMIN — FAMOTIDINE 20 MILLIGRAM(S): 10 INJECTION INTRAVENOUS at 12:44

## 2019-05-29 RX ADMIN — Medication 1 TABLET(S): at 12:42

## 2019-05-29 RX ADMIN — MIDODRINE HYDROCHLORIDE 10 MILLIGRAM(S): 2.5 TABLET ORAL at 16:59

## 2019-05-29 RX ADMIN — APIXABAN 5 MILLIGRAM(S): 2.5 TABLET, FILM COATED ORAL at 06:36

## 2019-05-29 RX ADMIN — Medication 20 MILLIEQUIVALENT(S): at 20:50

## 2019-05-29 RX ADMIN — ALBUTEROL 2.5 MILLIGRAM(S): 90 AEROSOL, METERED ORAL at 23:58

## 2019-05-29 RX ADMIN — APIXABAN 5 MILLIGRAM(S): 2.5 TABLET, FILM COATED ORAL at 17:23

## 2019-05-29 RX ADMIN — Medication 100 MILLIGRAM(S): at 22:49

## 2019-05-29 RX ADMIN — CHLORHEXIDINE GLUCONATE 1 APPLICATION(S): 213 SOLUTION TOPICAL at 06:46

## 2019-05-29 RX ADMIN — Medication 1 TABLET(S): at 12:37

## 2019-05-29 RX ADMIN — Medication 20 MILLIEQUIVALENT(S): at 17:23

## 2019-05-29 RX ADMIN — Medication 50 MILLIGRAM(S): at 06:36

## 2019-05-29 RX ADMIN — Medication 1 TABLET(S): at 12:36

## 2019-05-29 RX ADMIN — GABAPENTIN 300 MILLIGRAM(S): 400 CAPSULE ORAL at 17:27

## 2019-05-29 RX ADMIN — Medication 62.5 MILLIGRAM(S): at 07:30

## 2019-05-29 NOTE — DISCHARGE NOTE PROVIDER - HOSPITAL COURSE
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 aspiration pneumonia vs UTI/sacral wound infection/DVT    WBC trending down, today's CBC pending     seen by ID    Overall, fever, leukocytosis, aspiration pneumonia, ? 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 aspiration pneumonia vs UTI/sacral wound infection/DVT    WBC trending down, today's CBC pending     seen by ID    Overall, fever, leukocytosis, aspiration pneumonia, ? 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 aspiration pneumonia vs UTI/sacral wound infection/DVT    WBC trending down,      seen by ID:    Overall, fever, leukocytosis, aspiration pneumonia, ? 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 aspiration pneumonia vs UTI/sacral wound infection/DVT    WBC trending down,      seen by ID:    Overall, fever, leukocytosis, aspiration pneumonia, ? allergy to meropenem    - treatment with Vanco 750mg q 12 and Cefepime 1g q 12- completed course        follow up with wound care    - Routine gavin catheter care

## 2019-05-29 NOTE — PROGRESS NOTE ADULT - SUBJECTIVE AND OBJECTIVE BOX
infectious diseases progress note:    Patient is a 69y old  Female who presents with a chief complaint of pneumonia (29 May 2019 07:11)        Pneumonia           Allergies    ASA; dye contrast (Anaphylaxis)  aspirin (Short breath)  divalproex sodium (Other (Unknown))  Flowers and Plants (Short breath)  Haldol (Other (Unknown))  penicillin (Short breath; Rash)  sulfa drugs (Short breath; Rash)  vancomycin (Rash; Urticaria; Hives)  Xanax (Other (Unknown))    Intolerances        ANTIBIOTICS/RELEVANT:  antimicrobials  cefepime   IVPB 1000 milliGRAM(s) IV Intermittent every 12 hours  vancomycin  IVPB 750 milliGRAM(s) IV Intermittent every 12 hours  vancomycin  IVPB        immunologic:    OTHER:  ALBUTerol    0.083% 2.5 milliGRAM(s) Nebulizer every 6 hours  ALBUTerol    90 MICROgram(s) HFA Inhaler 1 Puff(s) Inhalation every 4 hours  apixaban 5 milliGRAM(s) Oral every 12 hours  ascorbic acid 500 milliGRAM(s) Oral daily  Awake Gel Capsules 20:1 (THC:CBD) 2 Capsule(s) 2 Capsule(s) Oral <User Schedule>  buDESOnide   0.5 milliGRAM(s) Respule 0.5 milliGRAM(s) Inhalation every 12 hours  Calm Drops 50:1 (THC:CBD) 2 milliLiter(s) 2 milliLiter(s) Oral <User Schedule>  chlorhexidine 2% Cloths 1 Application(s) Topical daily  chlorhexidine 4% Liquid 1 Application(s) Topical <User Schedule>  clonazePAM  Tablet 1.5 milliGRAM(s) Oral at bedtime  collagenase Ointment 1 Application(s) Topical daily  diphenoxylate/atropine 1 Tablet(s) Oral daily  famotidine   Suspension 20 milliGRAM(s) Oral daily  gabapentin   Solution 300 milliGRAM(s) Oral two times a day  [place] Drops 1:1 THC:CBD 3 milliLiter(s) 3 milliLiter(s) Oral <User Schedule>  hydrocortisone sodium succinate Injectable 50 milliGRAM(s) IV Push every 8 hours  lactobacillus acidophilus 1 Tablet(s) Oral daily  midodrine 10 milliGRAM(s) Oral every 8 hours  multivitamin 1 Tablet(s) Oral daily  QUEtiapine 25 milliGRAM(s) Oral at bedtime  tiZANidine 4 milliGRAM(s) Oral <User Schedule>      Objective:  Vital Signs Last 24 Hrs  T(C): 36.4 (29 May 2019 06:35), Max: 37.3 (29 May 2019 00:43)  T(F): 97.6 (29 May 2019 06:35), Max: 99.1 (29 May 2019 00:43)  HR: 85 (29 May 2019 06:35) (56 - 102)  BP: 154/78 (29 May 2019 06:35) (102/53 - 164/89)  BP(mean): 103 (28 May 2019 18:00) (76 - 112)  RR: 18 (29 May 2019 06:35) (14 - 23)  SpO2: 100% (29 May 2019 06:35) (97% - 100%)    PHYSICAL EXAM:  Constitutional:Well-developed, well nourished--no acute distress  Eyes:JACQUELIN, EOMI  Ear/Nose/Throat: no oral lesion, no sinus tenderness on percussion	  Neck:no JVD, no lymphadenopathy, supple  Respiratory: CTA maryjane  Cardiovascular: S1S2 RRR, no murmurs  Gastrointestinal:soft, (+) BS, no HSM  Extremities:no e/e/c        LABS:                        7.6    12.3  )-----------( 273      ( 28 May 2019 03:31 )             22.6     05-29    138  |  103  |  21  ----------------------------<  217<H>  3.0<L>   |  23  |  <0.30<L>    Ca    8.7      29 May 2019 06:52  Phos  2.0     05-29  Mg     1.7     05-29    TPro  5.1<L>  /  Alb  2.7<L>  /  TBili  0.1<L>  /  DBili  x   /  AST  14  /  ALT  15  /  AlkPhos  74  05-29            MICROBIOLOGY:    RECENT CULTURES:  05-27 @ 09:42 .Blood                No growth to date.    05-27 @ 05:58 .Blood       Growth in aerobic bottle: Gram Positive Cocci in Clusters           Growth in aerobic bottle: Gram Positive Cocci in Clusters    05-24 @ 05:05 .Urine                >=3 organisms. Probable collection contamination.    05-24 @ 02:36 .Blood   PCR    Growth in anaerobic bottle: Gram positive cocci in pairs    Blood Culture PCR  Blood Culture PCR     Growth in anaerobic bottle: Coag Negative Staphylococcus  Single set isolate, possible contaminant. Contact  Microbiology if susceptibility testing clinically  indicated.  "Due to technical problems, Proteus sp. will Not be reported as part of  theBCID panel until further notice"  ***Blood Panel PCR results on this specimen are available  approximately 3 hours after the Gram stain result.***  Gram stain, PCR, and/or culture results may not always  correspond due to difference in methodologies.  ************************************************************  This PCR assay was performed using [employer].  The following targets are tested for: Enterococcus,  vancomycin resistant enterococci, Listeria monocytogenes,  coagulase negative staphylococci, S. aureus,  methicillin resistant S. aureus, Streptococcus agalactiae  (Group B), S. pneumoniae, S. pyogenes (Group A),  Acinetobacter baumannii, Enterobacter cloacae, E. coli,  Klebsiella oxytoca, K. pneumoniae, Proteus sp.,  Serratia marcescens, Haemophilus influenzae,  Neisseria meningitidis, Pseudomonas aeruginosa, Candida  albicans, C. glabrata, C krusei, C parapsilosis,  C. tropicalis and the KPC resistance gene.          RESPIRATORY CULTURES:      Clostridium difficile [facility] Toxins A&amp;B, EIA:   Negative (05-26 @ 00:02)          RADIOLOGY & ADDITIONAL STUDIES:        Pager [phone]  After 5 pm/weekends or if no response :[phone]

## 2019-05-29 NOTE — DISCHARGE NOTE PROVIDER - CARE PROVIDER_API CALL
Deniz Bolden (DO)  Family Medicine  [address] [place] [address]  Phone: [phone]  Fax: [phone]  Follow Up Time: Deniz Bolden (DO)  Family Medicine  [address] [place] [address]  Phone: [phone]  Fax: [phone]  Follow Up Time: 1 week    Deniz Shin (MD)  Surgery  [facility] [address], [address]  [place] [address]  Phone: [phone]  Fax: [phone]  Follow Up Time: 1 week

## 2019-05-29 NOTE — DISCHARGE NOTE PROVIDER - NSDCCPCAREPLAN_GEN_ALL_CORE_FT
PRINCIPAL DISCHARGE DIAGNOSIS  Diagnosis: Pneumonia  Assessment and Plan of Treatment: treatment with antibiotic      SECONDARY DISCHARGE DIAGNOSES  Diagnosis: Sepsis  Assessment and Plan of Treatment: resolve symptoms PRINCIPAL DISCHARGE DIAGNOSIS  Diagnosis: Pneumonia  Assessment and Plan of Treatment: treatment with antibiotic  if you develop fever, brathing difficulty please get medical assistance      SECONDARY DISCHARGE DIAGNOSES  Diagnosis: Sepsis  Assessment and Plan of Treatment: resolve symptoms    Diagnosis: Anxiety  Assessment and Plan of Treatment: Anxiety  continue your medication    Diagnosis: Ulcerative proctitis without complication  Assessment and Plan of Treatment: Ulcerative proctitis without complication  supportive treatment    Diagnosis: Depression  Assessment and Plan of Treatment: Depressioncontinue your medication PRINCIPAL DISCHARGE DIAGNOSIS  Diagnosis: Sepsis, due to unspecified organism  Assessment and Plan of Treatment: Condition resolved      SECONDARY DISCHARGE DIAGNOSES  Diagnosis: Aspiration pneumonia  Assessment and Plan of Treatment: Concern for.  Completed course of intravenous antibiotics    Diagnosis: Advanced dementia  Assessment and Plan of Treatment: Frequent reorientation with familiar surrounding    Diagnosis: Ulcerative proctitis without complication  Assessment and Plan of Treatment: Ulcerative proctitis without complication  supportive treatment    Diagnosis: Anxiety  Assessment and Plan of Treatment: Anxiety  continue your medication

## 2019-05-29 NOTE — PROGRESS NOTE ADULT - SUBJECTIVE AND OBJECTIVE BOX
---___---___---___---___---___---___ ---___---___---___---___---___---___---___---___---                  M E D I C A L   A T T E N D I N G   P R O G R E S S   N O T E  ---___---___---___---___---___---___ ---___---___---___---___---___---___---___---___---        ================================================    ++CHIEF COMPLAINT:   Patient is a 69y old  Female who presents with a chief complaint of pneumonia (28 May 2019 14:04)      Pneumonia doing much better     ---___---___---___---___---___---  PAST MEDICAL / Surgical  HISTORY:  PAST MEDICAL & SURGICAL HISTORY:  CVA (cerebral vascular accident)  Fatty pancreas  PNA (pneumonia)  Pulmonary HTN  IGT (impaired glucose tolerance)  Ulcerative colitis  Acid reflux  Anxiety  Depression  Mouth sores  HLD (hyperlipidemia)  Asthma  Humeral head fracture  H/O: hysterectomy  H/O cataract extraction, left  History of knee replacement      ---___---___---___---___---___---  FAMILY HISTORY:   FAMILY HISTORY:  Family history of dementia (Grandparent)  Family history of colon cancer (Grandparent)        ---___---___---___---___---___---  ALLERGIES:   Allergies    ASA; dye contrast (Anaphylaxis)  aspirin (Short breath)  divalproex sodium (Other (Unknown))  Flowers and Plants (Short breath)  Haldol (Other (Unknown))  penicillin (Short breath; Rash)  sulfa drugs (Short breath; Rash)  vancomycin (Rash; Urticaria; Hives)  Xanax (Other (Unknown))    Intolerances        ---___---___---___---___---___---  MEDICATIONS:  MEDICATIONS  (STANDING):  ALBUTerol    0.083% 2.5 milliGRAM(s) Nebulizer every 6 hours  ALBUTerol    90 MICROgram(s) HFA Inhaler 1 Puff(s) Inhalation every 4 hours  apixaban 5 milliGRAM(s) Oral every 12 hours  ascorbic acid 500 milliGRAM(s) Oral daily  Awake Gel Capsules 20:1 (THC:CBD) 2 Capsule(s) 2 Capsule(s) Oral <User Schedule>  buDESOnide   0.5 milliGRAM(s) Respule 0.5 milliGRAM(s) Inhalation every 12 hours  Calm Drops 50:1 (THC:CBD) 2 milliLiter(s) 2 milliLiter(s) Oral <User Schedule>  cefepime   IVPB 1000 milliGRAM(s) IV Intermittent every 12 hours  chlorhexidine 2% Cloths 1 Application(s) Topical daily  chlorhexidine 4% Liquid 1 Application(s) Topical <User Schedule>  clonazePAM  Tablet 1.5 milliGRAM(s) Oral at bedtime  collagenase Ointment 1 Application(s) Topical daily  diphenoxylate/atropine 1 Tablet(s) Oral daily  famotidine   Suspension 20 milliGRAM(s) Oral daily  gabapentin   Solution 300 milliGRAM(s) Oral two times a day  [place] Drops 1:1 THC:CBD 3 milliLiter(s) 3 milliLiter(s) Oral <User Schedule>  hydrocortisone sodium succinate Injectable 50 milliGRAM(s) IV Push every 8 hours  lactobacillus acidophilus 1 Tablet(s) Oral daily  midodrine 10 milliGRAM(s) Oral every 8 hours  multivitamin 1 Tablet(s) Oral daily  QUEtiapine 25 milliGRAM(s) Oral at bedtime  tiZANidine 4 milliGRAM(s) Oral <User Schedule>  vancomycin  IVPB 750 milliGRAM(s) IV Intermittent every 12 hours  vancomycin  IVPB        MEDICATIONS  (PRN):      ---___---___---___---___---___---  REVIEW OF SYSTEM:    unable to obtain     ---___---___---___---___---___---  VITAL SIGNS:  69y , CAPILLARY BLOOD GLUCOSE      POCT Blood Glucose.: 196 mg/dL (28 May 2019 17:17)    T(C): 36.4 (19 @ 06:35), Max: 37.3 (19 @ 00:43)  HR: 85 (19 @ 06:35) (56 - 102)  BP: 154/78 (19 @ 06:35) (102/53 - 164/89)  RR: 18 (19 @ 06:35) (14 - 23)  SpO2: 100% (19 @ 06:35) (97% - 100%)  ---___---___---___---___---___---  PHYSICAL EXAM:    GEN: A&O X 0 , NAD , comfortable  HEENT: NCAT, PERRL, MMM, hearing intact  Neck: supple , no JVD  CVS: S1S2 , regular , No M/R/G appreciated  PULM: CTA B/L,  no W/R/R appreciated  ABD.: soft. non tender, non distended,  bowel sounds present peg noted   PEL; gavin noted   Extrem: intact pulses , no edema  left leg in brace   Derm: No rash , no ecchymoses large stage 4 sacral decubitus noted   PSYCH : normal mood,  no delusion not anxious     ---___---___---___---___---___---            LAB AND IMAGIN.6    12.3  )-----------( 273      ( 28 May 2019 03:31 )             22.6               -    134<L>  |  103  |  24<H>  ----------------------------<  224<H>  3.7   |  19<L>  |  <0.30<L>    Ca    8.2<L>      28 May 2019 03:31  Phos  2.5       Mg     2.0         TPro  4.7<L>  /  Alb  2.2<L>  /  TBili  0.1<L>  /  DBili  x   /  AST  21  /  ALT  12  /  AlkPhos  73                                  [All pertinent / recent Imaging reviewed]         ---___---___---___---___---___---___ ---___---___---___---___---                         A S S E S S M E N T   A N D   P L A N :      HEALTH ISSUES - PROBLEM Dx:  IGT (impaired glucose tolerance): IGT (impaired glucose tolerance) follow glucose adjust acordingly   Depression: Depression on klonpin and seroquel   Anxiety: Anxiety as above   Sepsis, due to unspecified organism: Sepsis, due to unspecified organism continue iv abx  Aspiration pneumonia of right middle lobe, unspecified aspiration pneumonia type: Aspiration pneumonia of right middle lobe, unspecified aspiration pneumonia type  hypotension resolved  taper steroids until back to prednisone 7.5 mg po daily   consider if possible to continue iv abx at home until completeted  GI/DVT Prophylaxis. on eliquis for dvt  chf on lisinopril and metoprolol    --------------------------------------------  Case discussed with   Education given on   ___________________________  Thank you,  Deniz Bolden  [phone]

## 2019-05-29 NOTE — DISCHARGE NOTE PROVIDER - PROVIDER TOKENS
PROVIDER:[TOKEN:[[id number]]] PROVIDER:[TOKEN:[[id number]],FOLLOWUP:[1 week]],PROVIDER:[TOKEN:[[id number]],FOLLOWUP:[1 week]]

## 2019-05-29 NOTE — DISCHARGE NOTE PROVIDER - NSDCFUADDINST_GEN_ALL_CORE_FT
Follow up with your Primary care doctor   Follow up with wound care Follow up with your Primary care doctor   Follow up with wound care:   cleanse all wound with Normal saline- pat dry  Cavilon to martin wound  Lt ankle : collagenase and foam daily  Thoracic spine : collagenase and foam daily  Sacrum : Collagenase/ aquacel / Comfeel daily

## 2019-05-29 NOTE — DISCHARGE NOTE PROVIDER - CARE PROVIDERS DIRECT ADDRESSES
,DirectAddress_Unknown ,DirectAddress_Unknown,[email or web address]

## 2019-05-29 NOTE — PROGRESS NOTE ADULT - ASSESSMENT
69 yo discharged from the hospital twice after prolong admission related to fever  Advanced dementia with severe contractures , broken leg, and large decubitus  also has an chronic mrsa infection of left hip with a spacer that can not  be removed     Has been treated with courses of vancomycin to suppress the mrsa infection which prior was suspected to be the most likely source of fever (PICC/vanco stopped a week ago)    New fever, ? aspiration pneumonia vs UTI/sacral wound infection/DVT  BCX 1/4 CoNS, contam?  WBC trending down, today's CBC pending     Overall, fever, leukocytosis, aspiration pneumonia, ? allergy to meropenem  - Vanco 750mg q 12 (trough before 4th dose)  - Cefepime 1g q 12  -   discussed with micu and   DNI   to complete 7 days of current ab and then dc         After 5pm and on weekends call [phone]

## 2019-05-30 LAB
-  AMPICILLIN/SULBACTAM: SIGNIFICANT CHANGE UP
-  CEFAZOLIN: SIGNIFICANT CHANGE UP
-  CLINDAMYCIN: SIGNIFICANT CHANGE UP
-  ERYTHROMYCIN: SIGNIFICANT CHANGE UP
-  GENTAMICIN: SIGNIFICANT CHANGE UP
-  OXACILLIN: SIGNIFICANT CHANGE UP
-  PENICILLIN: SIGNIFICANT CHANGE UP
-  RIFAMPIN: SIGNIFICANT CHANGE UP
-  TETRACYCLINE: SIGNIFICANT CHANGE UP
-  TRIMETHOPRIM/SULFAMETHOXAZOLE: SIGNIFICANT CHANGE UP
-  VANCOMYCIN: SIGNIFICANT CHANGE UP
ANION GAP SERPL CALC-SCNC: 14 MMOL/L — SIGNIFICANT CHANGE UP (ref 5–17)
BASOPHILS # BLD AUTO: 0.03 K/UL — SIGNIFICANT CHANGE UP (ref 0–0.2)
BASOPHILS NFR BLD AUTO: 0.2 % — SIGNIFICANT CHANGE UP (ref 0–2)
BUN SERPL-MCNC: 24 MG/DL — HIGH (ref 7–23)
CALCIUM SERPL-MCNC: 8.8 MG/DL — SIGNIFICANT CHANGE UP (ref 8.4–10.5)
CHLORIDE SERPL-SCNC: 101 MMOL/L — SIGNIFICANT CHANGE UP (ref 96–108)
CO2 SERPL-SCNC: 23 MMOL/L — SIGNIFICANT CHANGE UP (ref 22–31)
CREAT SERPL-MCNC: <0.3 MG/DL — LOW (ref 0.5–1.3)
CULTURE RESULTS: SIGNIFICANT CHANGE UP
EOSINOPHIL # BLD AUTO: 0.08 K/UL — SIGNIFICANT CHANGE UP (ref 0–0.5)
EOSINOPHIL NFR BLD AUTO: 0.4 % — SIGNIFICANT CHANGE UP (ref 0–6)
GLUCOSE SERPL-MCNC: 192 MG/DL — HIGH (ref 70–99)
HCT VFR BLD CALC: 25.8 % — LOW (ref 34.5–45)
HGB BLD-MCNC: 8.1 G/DL — LOW (ref 11.5–15.5)
IMM GRANULOCYTES NFR BLD AUTO: 3.1 % — HIGH (ref 0–1.5)
LYMPHOCYTES # BLD AUTO: 1.25 K/UL — SIGNIFICANT CHANGE UP (ref 1–3.3)
LYMPHOCYTES # BLD AUTO: 7 % — LOW (ref 13–44)
MAGNESIUM SERPL-MCNC: 1.7 MG/DL — SIGNIFICANT CHANGE UP (ref 1.6–2.6)
MCHC RBC-ENTMCNC: 29.9 PG — SIGNIFICANT CHANGE UP (ref 27–34)
MCHC RBC-ENTMCNC: 31.4 GM/DL — LOW (ref 32–36)
MCV RBC AUTO: 95.2 FL — SIGNIFICANT CHANGE UP (ref 80–100)
METHOD TYPE: SIGNIFICANT CHANGE UP
MONOCYTES # BLD AUTO: 1.41 K/UL — HIGH (ref 0–0.9)
MONOCYTES NFR BLD AUTO: 7.8 % — SIGNIFICANT CHANGE UP (ref 2–14)
NEUTROPHILS # BLD AUTO: 14.65 K/UL — HIGH (ref 1.8–7.4)
NEUTROPHILS NFR BLD AUTO: 81.5 % — HIGH (ref 43–77)
ORGANISM # SPEC MICROSCOPIC CNT: SIGNIFICANT CHANGE UP
ORGANISM # SPEC MICROSCOPIC CNT: SIGNIFICANT CHANGE UP
PLATELET # BLD AUTO: 385 K/UL — SIGNIFICANT CHANGE UP (ref 150–400)
POTASSIUM SERPL-MCNC: 3.8 MMOL/L — SIGNIFICANT CHANGE UP (ref 3.5–5.3)
POTASSIUM SERPL-SCNC: 3.8 MMOL/L — SIGNIFICANT CHANGE UP (ref 3.5–5.3)
RBC # BLD: 2.71 M/UL — LOW (ref 3.8–5.2)
RBC # FLD: 15.4 % — HIGH (ref 10.3–14.5)
SODIUM SERPL-SCNC: 138 MMOL/L — SIGNIFICANT CHANGE UP (ref 135–145)
SPECIMEN SOURCE: SIGNIFICANT CHANGE UP
WBC # BLD: 17.97 K/UL — HIGH (ref 3.8–10.5)
WBC # FLD AUTO: 17.97 K/UL — HIGH (ref 3.8–10.5)

## 2019-05-30 PROCEDURE — 99232 SBSQ HOSP IP/OBS MODERATE 35: CPT

## 2019-05-30 RX ADMIN — TIZANIDINE 4 MILLIGRAM(S): 4 TABLET ORAL at 21:14

## 2019-05-30 RX ADMIN — ALBUTEROL 2.5 MILLIGRAM(S): 90 AEROSOL, METERED ORAL at 13:03

## 2019-05-30 RX ADMIN — FAMOTIDINE 20 MILLIGRAM(S): 10 INJECTION INTRAVENOUS at 17:12

## 2019-05-30 RX ADMIN — Medication 0.5 MILLIGRAM(S): at 06:44

## 2019-05-30 RX ADMIN — CEFEPIME 100 MILLIGRAM(S): 1 INJECTION, POWDER, FOR SOLUTION INTRAMUSCULAR; INTRAVENOUS at 06:44

## 2019-05-30 RX ADMIN — Medication 0.5 MILLIGRAM(S): at 17:21

## 2019-05-30 RX ADMIN — CEFEPIME 100 MILLIGRAM(S): 1 INJECTION, POWDER, FOR SOLUTION INTRAMUSCULAR; INTRAVENOUS at 17:20

## 2019-05-30 RX ADMIN — CHLORHEXIDINE GLUCONATE 1 APPLICATION(S): 213 SOLUTION TOPICAL at 09:19

## 2019-05-30 RX ADMIN — APIXABAN 5 MILLIGRAM(S): 2.5 TABLET, FILM COATED ORAL at 17:21

## 2019-05-30 RX ADMIN — TIZANIDINE 4 MILLIGRAM(S): 4 TABLET ORAL at 11:37

## 2019-05-30 RX ADMIN — ALBUTEROL 2.5 MILLIGRAM(S): 90 AEROSOL, METERED ORAL at 17:20

## 2019-05-30 RX ADMIN — CHLORHEXIDINE GLUCONATE 1 APPLICATION(S): 213 SOLUTION TOPICAL at 06:42

## 2019-05-30 RX ADMIN — Medication 1 TABLET(S): at 13:01

## 2019-05-30 RX ADMIN — Medication 50 MILLIGRAM(S): at 22:50

## 2019-05-30 RX ADMIN — GABAPENTIN 300 MILLIGRAM(S): 400 CAPSULE ORAL at 21:14

## 2019-05-30 RX ADMIN — Medication 1.5 MILLIGRAM(S): at 22:50

## 2019-05-30 RX ADMIN — QUETIAPINE FUMARATE 25 MILLIGRAM(S): 200 TABLET, FILM COATED ORAL at 22:50

## 2019-05-30 RX ADMIN — Medication 50 MILLIGRAM(S): at 06:44

## 2019-05-30 RX ADMIN — MIDODRINE HYDROCHLORIDE 10 MILLIGRAM(S): 2.5 TABLET ORAL at 22:50

## 2019-05-30 RX ADMIN — Medication 1 APPLICATION(S): at 13:04

## 2019-05-30 RX ADMIN — Medication 500 MILLIGRAM(S): at 13:05

## 2019-05-30 RX ADMIN — Medication 1 TABLET(S): at 13:03

## 2019-05-30 RX ADMIN — GABAPENTIN 300 MILLIGRAM(S): 400 CAPSULE ORAL at 06:44

## 2019-05-30 RX ADMIN — APIXABAN 5 MILLIGRAM(S): 2.5 TABLET, FILM COATED ORAL at 06:44

## 2019-05-30 RX ADMIN — ALBUTEROL 2.5 MILLIGRAM(S): 90 AEROSOL, METERED ORAL at 06:44

## 2019-05-30 RX ADMIN — Medication 1 TABLET(S): at 13:02

## 2019-05-30 RX ADMIN — Medication 50 MILLIGRAM(S): at 13:01

## 2019-05-30 NOTE — PROGRESS NOTE ADULT - SUBJECTIVE AND OBJECTIVE BOX
infectious diseases progress note:    Patient is a 69y old  Female who presents with a chief complaint of pneumonia (30 May 2019 07:53)        Pneumonia             Allergies    ASA; dye contrast (Anaphylaxis)  aspirin (Short breath)  divalproex sodium (Other (Unknown))  Flowers and Plants (Short breath)  Haldol (Other (Unknown))  penicillin (Short breath; Rash)  sulfa drugs (Short breath; Rash)  vancomycin (Rash; Urticaria; Hives)  Xanax (Other (Unknown))    Intolerances        ANTIBIOTICS/RELEVANT:  antimicrobials  cefepime   IVPB 1000 milliGRAM(s) IV Intermittent every 12 hours    immunologic:    OTHER:  ALBUTerol    0.083% 2.5 milliGRAM(s) Nebulizer every 6 hours  ALBUTerol    90 MICROgram(s) HFA Inhaler 1 Puff(s) Inhalation every 4 hours  apixaban 5 milliGRAM(s) Oral every 12 hours  ascorbic acid 500 milliGRAM(s) Oral daily  Awake Gel Capsules 20:1 (THC:CBD) 2 Capsule(s) 2 Capsule(s) Oral <User Schedule>  buDESOnide   0.5 milliGRAM(s) Respule 0.5 milliGRAM(s) Inhalation every 12 hours  Calm Drops 50:1 (THC:CBD) 2 milliLiter(s) 2 milliLiter(s) Oral <User Schedule>  chlorhexidine 2% Cloths 1 Application(s) Topical daily  chlorhexidine 4% Liquid 1 Application(s) Topical <User Schedule>  clonazePAM  Tablet 1.5 milliGRAM(s) Oral at bedtime  collagenase Ointment 1 Application(s) Topical daily  diphenoxylate/atropine 1 Tablet(s) Oral daily  famotidine   Suspension 20 milliGRAM(s) Oral daily  gabapentin   Solution 300 milliGRAM(s) Oral two times a day  [place] Drops 1:1 THC:CBD 3 milliLiter(s) 3 milliLiter(s) Oral <User Schedule>  hydrocortisone sodium succinate Injectable 50 milliGRAM(s) IV Push every 8 hours  lactobacillus acidophilus 1 Tablet(s) Oral daily  midodrine 10 milliGRAM(s) Oral every 8 hours  multivitamin 1 Tablet(s) Oral daily  QUEtiapine 25 milliGRAM(s) Oral at bedtime  tiZANidine 4 milliGRAM(s) Oral <User Schedule>      Objective:  Vital Signs Last 24 Hrs  T(C): 37.1 (30 May 2019 06:38), Max: 37.1 (30 May 2019 06:38)  T(F): 98.7 (30 May 2019 06:38), Max: 98.7 (30 May 2019 06:38)  HR: 110 (30 May 2019 06:38) (101 - 116)  BP: 158/92 (30 May 2019 06:38) (129/76 - 166/82)  BP(mean): --  RR: 18 (30 May 2019 06:38) (18 - 20)  SpO2: 100% (30 May 2019 06:38) (96% - 100%)    PHYSICAL EXAM:     Eyes:JACQUELIN, EOMI  Ear/Nose/Throat: no oral lesion, no sinus tenderness on percussion	  Neck:no JVD, no lymphadenopathy, supple  Respiratory: CTA maryjane  Cardiovascular: S1S2 RRR, no murmurs  Gastrointestinal:soft, (+) BS, no HSM  Extremities:no e/e/c        LABS:                        7.7    10.32 )-----------( 259      ( 29 May 2019 08:28 )             24.8     05-30    138  |  101  |  24<H>  ----------------------------<  192<H>  3.8   |  23  |  <0.30<L>    Ca    8.8      30 May 2019 06:47  Phos  2.0     05-29  Mg     1.7     05-30    TPro  5.1<L>  /  Alb  2.7<L>  /  TBili  0.1<L>  /  DBili  x   /  AST  14  /  ALT  15  /  AlkPhos  74  05-29            MICROBIOLOGY:    RECENT CULTURES:  05-27 @ 09:42 .Blood                No growth to date.    05-27 @ 05:58 .Blood       Growth in aerobic bottle: Gram Positive Cocci in Clusters           Growth in aerobic bottle: Staphylococcus epidermidis Susceptibility to  follow.    05-24 @ 05:05 .Urine                >=3 organisms. Probable collection contamination.    05-24 @ 02:36 .Blood   PCR    Growth in anaerobic bottle: Gram positive cocci in pairs    Blood Culture PCR  Blood Culture PCR     Growth in anaerobic bottle: Coag Negative Staphylococcus  Single set isolate, possible contaminant. Contact  Microbiology if susceptibility testing clinically  indicated.  "Due to technical problems, Proteus sp. will Not be reported as part of  theBCID panel until further notice"  ***Blood Panel PCR results on this specimen are available  approximately 3 hours after the Gram stain result.***  Gram stain, PCR, and/or culture results may not always  correspond due to difference in methodologies.  ************************************************************  This PCR assay was performed using [employer].  The following targets are tested for: Enterococcus,  vancomycin resistant enterococci, Listeria monocytogenes,  coagulase negative staphylococci, S. aureus,  methicillin resistant S. aureus, Streptococcus agalactiae  (Group B), S. pneumoniae, S. pyogenes (Group A),  Acinetobacter baumannii, Enterobacter cloacae, E. coli,  Klebsiella oxytoca, K. pneumoniae, Proteus sp.,  Serratia marcescens, Haemophilus influenzae,  Neisseria meningitidis, Pseudomonas aeruginosa, Candida  albicans, C. glabrata, C krusei, C parapsilosis,  C. tropicalis and the KPC resistance gene.          RESPIRATORY CULTURES:      Clostridium difficile GDH Toxins A&amp;B, EIA:   Negative (05-26 @ 00:02)          RADIOLOGY & ADDITIONAL STUDIES:        Pager [phone]  After 5 pm/weekends or if no response :[phone]

## 2019-05-30 NOTE — PROGRESS NOTE ADULT - ASSESSMENT
69 yo discharged from the hospital twice after prolong admission related to fever  Advanced dementia with severe contractures , broken leg, and large decubitus  also has an chronic mrsa infection of left hip with a spacer that can not  be removed     Has been treated with courses of vancomycin to suppress the mrsa infection which prior was suspected to be the most likely source of fever (PICC/vanco stopped a week ago)    New fever, ? aspiration pneumonia vs UTI/sacral wound infection/DVT  BCX 1/4 CoNS, contam?  WBC trending down, today's CBC pending     Overall, fever, leukocytosis, aspiration pneumonia, ? allergy to meropenem  - Vanco 750mg q 12 (trough before 4th dose)  - Cefepime 1g q 12  -      DNI   to complete   of current ab and then dc in am  dc vanco today due to high vanco level         After 5pm and on weekends call [phone]

## 2019-05-30 NOTE — PROGRESS NOTE ADULT - SUBJECTIVE AND OBJECTIVE BOX
---___---___---___---___---___---___ ---___---___---___---___---___---___---___---___---___---                    <<<  M E D I C A L   A T T E N D I N G    F O L L O W    U P   N O T E  >>>    -now with rectal tube  doing better      ---___---___---___---___---___---      <<<  MEDICATIONS:  >>>    MEDICATIONS  (STANDING):  ALBUTerol    90 MICROgram(s) HFA Inhaler 1 Puff(s) Inhalation every 4 hours  ALBUTerol/ipratropium for Nebulization 3 milliLiter(s) Nebulizer every 6 hours  apixaban 5 milliGRAM(s) Oral every 12 hours  artificial tears (preservative free) Ophthalmic Solution 1 Drop(s) Both EYES three times a day  ascorbic acid 500 milliGRAM(s) Oral daily  buDESOnide 160 MICROgram(s)/formoterol 4.5 MICROgram(s) Inhaler 2 Puff(s) Inhalation two times a day  ergocalciferol 79959 Unit(s) Oral every week  famotidine    Tablet 20 milliGRAM(s) Oral daily  ferrous    sulfate Liquid 300 milliGRAM(s) Enteral Tube daily  gabapentin   Solution 300 milliGRAM(s) Oral two times a day  hydrocortisone sodium succinate Injectable 100 milliGRAM(s) IV Push every 8 hours  lisinopril 5 milliGRAM(s) Enteral Tube at bedtime  Medical Marijuana Oil 1 milliLiter(s),Medical Marijuana Oil 1 milliLiter(s) 1 milliLiter(s) Oral <User Schedule>  metoprolol tartrate 12.5 milliGRAM(s) Enteral Tube two times a day  midodrine 10 milliGRAM(s) Oral every 8 hours  multivitamin 1 Tablet(s) Oral daily  petrolatum white Ointment 1 Application(s) Topical three times a day  QUEtiapine 25 milliGRAM(s) Oral at bedtime  sodium chloride 0.9% Bolus 500 milliLiter(s) IV Bolus once  tiotropium 18 MICROgram(s) Capsule 1 Capsule(s) Inhalation daily  tiZANidine 4 milliGRAM(s) Oral <User Schedule>  vancomycin  IVPB 1000 milliGRAM(s) IV Intermittent every 24 hours      MEDICATIONS  (PRN):  acetaminophen    Suspension .. 650 milliGRAM(s) Oral every 6 hours PRN Temp greater or equal to 38C (100.4F), Mild Pain (1 - 3), Moderate Pain (4 - 6)  diphenhydrAMINE   Elixir 25 milliGRAM(s) Oral every 6 hours PRN Rash and/or Itching  nystatin Powder 1 Application(s) Topical two times a day PRN rash/itchiness  oxyCODONE    IR 5 milliGRAM(s) Oral every 6 hours PRN Moderate Pain (4 - 6)/Severe Pain (7 - 10)  sodium chloride 0.65% Nasal 1 Spray(s) Both Nostrils three times a day PRN Nasal Congestion       ---___---___---___---___---___---     <<<REVIEW OF SYSTEM: >>>    unable to obtain   ---___---___---___---___---___---          <<<  VITAL SIGNS: >>>    T(F): 97.7 (04-11-19 @ 04:26), Max: 97.8 (04-10-19 @ 21:00)  HR: 80 (04-11-19 @ 04:26) (79 - 94)  BP: 126/77 (04-11-19 @ 04:26) (115/64 - 137/84)  RR: 18 (04-11-19 @ 04:26) (17 - 18)  SpO2: 99% (04-11-19 @ 04:26) (99% - 100%)  Wt(kg): --  CAPILLARY BLOOD GLUCOSE        I&O's Summary    10 Apr 2019 07:01  -  11 Apr 2019 07:00  --------------------------------------------------------  IN: 700 mL / OUT: 1450 mL / NET: -750 mL         ---___---___---___---___---___---                       PHYSICAL EXAM:    GEN: A&O X0 3 , NAD , comfortable  HEENT: NCAT, PERRL, MMM, no scleral icterus, hearing intact  NECK: Supple, No JVD  CVS: S1S2 , regular , No M/R/G appreciated  PULM: CTA B/L,  no W/R/R appreciated  ABD.: soft. non tender, non distended,  bowel sounds present peg and rectal tube in place . gavin in place   Extrem: intact pulses , no edema noted picc in place   Derm: No rash or ecchymosis noted  PSYCH: normal mood, no depression, not anxious     ---___---___---___---___---___---     <<<  LAB AND IMAGING: >>>                          9.2    20.0  )-----------( 286      ( 11 Apr 2019 09:43 )             27.2               04-10    143  |  101  |  34<H>  ----------------------------<  280<H>  4.0   |  29  |  <0.30<L>    Ca    8.0<L>      10 Apr 2019 05:41  Mg     2.0     04-10                                 [All pertinent / recent available Imaging reports and other labs reviewed]     ---___---___---___---___---___---___ ---___---___---___---___---           <<<  A S S E S S M E N T   A N D   P L A N :  >>>      sacral decubitus  chronic mrsa   asthma  dementia   chronic pain   left leg fracture    continue current treatment   awainting  suction machine for wound care   will need  1/1/2 week iv abx  at home   tenttive dc for saturday     -GI/DVT Prophylaxis.    --------------------------------------------  Case discussed with   Education given on     >>______________________<<      Deniz Bolden .         phone   [phone] DISPLAY PLAN FREE TEXT

## 2019-05-30 NOTE — PROGRESS NOTE ADULT - SUBJECTIVE AND OBJECTIVE BOX
SUBJECTIVE: Pt seen, chart reviewed.   at bedside   status discussed   For f/u sacral decubitus    Allergies    ASA; dye contrast (Anaphylaxis)  aspirin (Short breath)  divalproex sodium (Other (Unknown))  Flowers and Plants (Short breath)  Haldol (Other (Unknown))  penicillin (Short breath; Rash)  sulfa drugs (Short breath; Rash)  vancomycin (Rash; Urticaria; Hives)  Xanax (Other (Unknown))    Intolerances        apixaban 5 milliGRAM(s) Oral every 12 hours  cefepime   IVPB 1000 milliGRAM(s) IV Intermittent every 12 hours      PAST MEDICAL & SURGICAL HISTORY:  CVA (cerebral vascular accident)  Fatty pancreas  PNA (pneumonia)  Pulmonary HTN  IGT (impaired glucose tolerance)  Ulcerative colitis  Acid reflux  Anxiety  Depression  Mouth sores  HLD (hyperlipidemia)  Asthma  Humeral head fracture  H/O: hysterectomy  H/O cataract extraction, left  History of knee replacement      HEALTH ISSUES - PROBLEM Dx:  Ulcerative proctitis without complication: Ulcerative proctitis without complication  IGT (impaired glucose tolerance): IGT (impaired glucose tolerance)  Depression: Depression  Anxiety: Anxiety  Sepsis, due to unspecified organism: Sepsis, due to unspecified organism  Aspiration pneumonia of right middle lobe, unspecified aspiration pneumonia type: Aspiration pneumonia of right middle lobe, unspecified aspiration pneumonia type          FAMILY HISTORY:  Family history of dementia (Grandparent)  Family history of colon cancer (Grandparent)      Physical Exam:  Vital Signs Last 24 Hrs  T(C): 37.1 (30 May 2019 06:38), Max: 37.1 (30 May 2019 06:38)  T(F): 98.7 (30 May 2019 06:38), Max: 98.7 (30 May 2019 06:38)  HR: 110 (30 May 2019 06:38) (101 - 116)  BP: 158/92 (30 May 2019 06:38) (129/76 - 166/82)  BP(mean): --  RR: 18 (30 May 2019 06:38) (18 - 20)  SpO2: 100% (30 May 2019 06:38) (96% - 100%)    At baseline alertness and immobility  Incontinent of stool  Large sacral wound , with no bone exposed, significant, increased,  granulation,  no odor  Will discuss VAC use as outpatient    LABS:                        8.1    17.97 )-----------( 385      ( 30 May 2019 09:08 )             25.8           Outpatient follow up information provided- [phone]

## 2019-05-30 NOTE — PROGRESS NOTE ADULT - SUBJECTIVE AND OBJECTIVE BOX
---___---___---___---___---___---___ ---___---___---___---___---___---___---___---___---                  M E D I C A L   A T T E N D I N G   P R O G R E S S   N O T E  ---___---___---___---___---___---___ ---___---___---___---___---___---___---___---___---        ================================================    ++CHIEF COMPLAINT:   Patient is a 69y old  Female who presents with a chief complaint of pneumonia (29 May 2019 13:43)      Pneumonia improving doing well     ---___---___---___---___---___---  PAST MEDICAL / Surgical  HISTORY:  PAST MEDICAL & SURGICAL HISTORY:  CVA (cerebral vascular accident)  Fatty pancreas  PNA (pneumonia)  Pulmonary HTN  IGT (impaired glucose tolerance)  Ulcerative colitis  Acid reflux  Anxiety  Depression  Mouth sores  HLD (hyperlipidemia)  Asthma  Humeral head fracture  H/O: hysterectomy  H/O cataract extraction, left  History of knee replacement      ---___---___---___---___---___---  FAMILY HISTORY:   FAMILY HISTORY:  Family history of dementia (Grandparent)  Family history of colon cancer (Grandparent)        ---___---___---___---___---___---  ALLERGIES:   Allergies    ASA; dye contrast (Anaphylaxis)  aspirin (Short breath)  divalproex sodium (Other (Unknown))  Flowers and Plants (Short breath)  Haldol (Other (Unknown))  penicillin (Short breath; Rash)  sulfa drugs (Short breath; Rash)  vancomycin (Rash; Urticaria; Hives)  Xanax (Other (Unknown))    Intolerances        ---___---___---___---___---___---  MEDICATIONS:  MEDICATIONS  (STANDING):  ALBUTerol    0.083% 2.5 milliGRAM(s) Nebulizer every 6 hours  ALBUTerol    90 MICROgram(s) HFA Inhaler 1 Puff(s) Inhalation every 4 hours  apixaban 5 milliGRAM(s) Oral every 12 hours  ascorbic acid 500 milliGRAM(s) Oral daily  Awake Gel Capsules 20:1 (THC:CBD) 2 Capsule(s) 2 Capsule(s) Oral <User Schedule>  buDESOnide   0.5 milliGRAM(s) Respule 0.5 milliGRAM(s) Inhalation every 12 hours  Calm Drops 50:1 (THC:CBD) 2 milliLiter(s) 2 milliLiter(s) Oral <User Schedule>  cefepime   IVPB 1000 milliGRAM(s) IV Intermittent every 12 hours  chlorhexidine 2% Cloths 1 Application(s) Topical daily  chlorhexidine 4% Liquid 1 Application(s) Topical <User Schedule>  clonazePAM  Tablet 1.5 milliGRAM(s) Oral at bedtime  collagenase Ointment 1 Application(s) Topical daily  diphenoxylate/atropine 1 Tablet(s) Oral daily  famotidine   Suspension 20 milliGRAM(s) Oral daily  gabapentin   Solution 300 milliGRAM(s) Oral two times a day  [place] Drops 1:1 THC:CBD 3 milliLiter(s) 3 milliLiter(s) Oral <User Schedule>  hydrocortisone sodium succinate Injectable 50 milliGRAM(s) IV Push every 8 hours  lactobacillus acidophilus 1 Tablet(s) Oral daily  midodrine 10 milliGRAM(s) Oral every 8 hours  multivitamin 1 Tablet(s) Oral daily  QUEtiapine 25 milliGRAM(s) Oral at bedtime  tiZANidine 4 milliGRAM(s) Oral <User Schedule>  vancomycin  IVPB 500 milliGRAM(s) IV Intermittent every 12 hours    MEDICATIONS  (PRN):      ---___---___---___---___---___---  REVIEW OF SYSTEM:    unable to obtain     ---___---___---___---___---___---  VITAL SIGNS:  69y , CAPILLARY BLOOD GLUCOSE      POCT Blood Glucose.: 196 mg/dL (28 May 2019 17:17)    T(C): 37.1 (19 @ 06:38), Max: 37.1 (19 @ 06:38)  HR: 110 (19 @ 06:38) (101 - 116)  BP: 158/92 (19 @ 06:38) (129/76 - 166/82)  RR: 18 (19 @ 06:38) (18 - 20)  SpO2: 100% (19 @ 06:38) (96% - 100%)  ---___---___---___---___---___---  PHYSICAL EXAM:    GEN: A&O X 0  , NAD , comfortable  HEENT: NCAT, PERRL, MMM, hearing intact  Neck: supple , no JVD  CVS: S1S2 , regular , No M/R/G appreciated  PULM: CTA B/L,  no W/R/R appreciated  ABD.: soft. non tender, non distended,  bowel sounds present soft nt nd   Extrem: intact pulses , no edema  left leg in brace   Derm: No rash , no ecchymoses large stage 4 decubitus        ---___---___---___---___---___---            LAB AND IMAGIN.7    10.32 )-----------( 259      ( 29 May 2019 08:28 )             24.8                   138  |  101  |  24<H>  ----------------------------<  192<H>  3.8   |  23  |  <0.30<L>    Ca    8.8      30 May 2019 06:47  Phos  2.0     -  Mg     1.7         TPro  5.1<L>  /  Alb  2.7<L>  /  TBili  0.1<L>  /  DBili  x   /  AST  14  /  ALT  15  /  AlkPhos  74                                  [All pertinent / recent Imaging reviewed]         ---___---___---___---___---___---___ ---___---___---___---___---                         A S S E S S M E N T   A N D   P L A N :      HEALTH ISSUES - PROBLEM Dx:  Ulcerative proctitis without complication: Ulcerative proctitis without complication supportive care  IGT (impaired glucose tolerance): IGT (impaired glucose tolerance) continue lispro until dc and put back on metformin liquis   Depression: Depression on seroquel and klonopin   Anxiety: Anxiety as above   Sepsis, due to unspecified organism: Sepsis, due to unspecified organism improved   Aspiration pneumonia of right middle lobe, unspecified aspiration pneumonia type: Aspiration pneumonia of right middle lobe, unspecified aspiration pneumonia type imporved   -GI/DVT Prophylaxis. on eliquis     prednisone taper to oral prednisone at home   otherwise continue meds  as before admission   --------------------------------------------  Case discussed with    likely dc home in am   Education given on   ___________________________  Thank you,  Deniz Bolden  [phone]

## 2019-05-31 ENCOUNTER — TRANSCRIPTION ENCOUNTER (OUTPATIENT)
Age: 69
End: 2019-05-31

## 2019-05-31 VITALS
RESPIRATION RATE: 18 BRPM | OXYGEN SATURATION: 100 % | SYSTOLIC BLOOD PRESSURE: 117 MMHG | DIASTOLIC BLOOD PRESSURE: 72 MMHG | TEMPERATURE: 98 F | HEART RATE: 96 BPM

## 2019-05-31 DIAGNOSIS — F32.9 MAJOR DEPRESSIVE DISORDER, SINGLE EPISODE, UNSPECIFIED: ICD-10-CM

## 2019-05-31 LAB
ANION GAP SERPL CALC-SCNC: 11 MMOL/L — SIGNIFICANT CHANGE UP (ref 5–17)
BASOPHILS # BLD AUTO: 0.03 K/UL — SIGNIFICANT CHANGE UP (ref 0–0.2)
BASOPHILS NFR BLD AUTO: 0.2 % — SIGNIFICANT CHANGE UP (ref 0–2)
BLD GP AB SCN SERPL QL: NEGATIVE — SIGNIFICANT CHANGE UP
BUN SERPL-MCNC: 23 MG/DL — SIGNIFICANT CHANGE UP (ref 7–23)
CALCIUM SERPL-MCNC: 8.3 MG/DL — LOW (ref 8.4–10.5)
CHLORIDE SERPL-SCNC: 101 MMOL/L — SIGNIFICANT CHANGE UP (ref 96–108)
CO2 SERPL-SCNC: 26 MMOL/L — SIGNIFICANT CHANGE UP (ref 22–31)
CREAT SERPL-MCNC: 0.3 MG/DL — LOW (ref 0.5–1.3)
EOSINOPHIL # BLD AUTO: 0.13 K/UL — SIGNIFICANT CHANGE UP (ref 0–0.5)
EOSINOPHIL NFR BLD AUTO: 0.9 % — SIGNIFICANT CHANGE UP (ref 0–6)
GLUCOSE BLDC GLUCOMTR-MCNC: 182 MG/DL — HIGH (ref 70–99)
GLUCOSE BLDC GLUCOMTR-MCNC: 222 MG/DL — HIGH (ref 70–99)
GLUCOSE SERPL-MCNC: 213 MG/DL — HIGH (ref 70–99)
HCT VFR BLD CALC: 23.2 % — LOW (ref 34.5–45)
HCT VFR BLD CALC: 23.6 % — LOW (ref 34.5–45)
HGB BLD-MCNC: 7.1 G/DL — LOW (ref 11.5–15.5)
HGB BLD-MCNC: 7.8 G/DL — LOW (ref 11.5–15.5)
IMM GRANULOCYTES NFR BLD AUTO: 2.7 % — HIGH (ref 0–1.5)
LYMPHOCYTES # BLD AUTO: 0.94 K/UL — LOW (ref 1–3.3)
LYMPHOCYTES # BLD AUTO: 6.9 % — LOW (ref 13–44)
MCHC RBC-ENTMCNC: 29.2 PG — SIGNIFICANT CHANGE UP (ref 27–34)
MCHC RBC-ENTMCNC: 30.6 GM/DL — LOW (ref 32–36)
MCHC RBC-ENTMCNC: 30.8 PG — SIGNIFICANT CHANGE UP (ref 27–34)
MCHC RBC-ENTMCNC: 33.2 GM/DL — SIGNIFICANT CHANGE UP (ref 32–36)
MCV RBC AUTO: 92.9 FL — SIGNIFICANT CHANGE UP (ref 80–100)
MCV RBC AUTO: 95.5 FL — SIGNIFICANT CHANGE UP (ref 80–100)
MONOCYTES # BLD AUTO: 1 K/UL — HIGH (ref 0–0.9)
MONOCYTES NFR BLD AUTO: 7.3 % — SIGNIFICANT CHANGE UP (ref 2–14)
NEUTROPHILS # BLD AUTO: 11.25 K/UL — HIGH (ref 1.8–7.4)
NEUTROPHILS NFR BLD AUTO: 82 % — HIGH (ref 43–77)
PLATELET # BLD AUTO: 361 K/UL — SIGNIFICANT CHANGE UP (ref 150–400)
PLATELET # BLD AUTO: 411 K/UL — HIGH (ref 150–400)
POTASSIUM SERPL-MCNC: 3.6 MMOL/L — SIGNIFICANT CHANGE UP (ref 3.5–5.3)
POTASSIUM SERPL-SCNC: 3.6 MMOL/L — SIGNIFICANT CHANGE UP (ref 3.5–5.3)
RBC # BLD: 2.43 M/UL — LOW (ref 3.8–5.2)
RBC # BLD: 2.54 M/UL — LOW (ref 3.8–5.2)
RBC # FLD: 14.9 % — HIGH (ref 10.3–14.5)
RBC # FLD: 15.4 % — HIGH (ref 10.3–14.5)
RH IG SCN BLD-IMP: NEGATIVE — SIGNIFICANT CHANGE UP
SODIUM SERPL-SCNC: 138 MMOL/L — SIGNIFICANT CHANGE UP (ref 135–145)
WBC # BLD: 13.72 K/UL — HIGH (ref 3.8–10.5)
WBC # BLD: 15.1 K/UL — HIGH (ref 3.8–10.5)
WBC # FLD AUTO: 13.72 K/UL — HIGH (ref 3.8–10.5)
WBC # FLD AUTO: 15.1 K/UL — HIGH (ref 3.8–10.5)

## 2019-05-31 PROCEDURE — 85014 HEMATOCRIT: CPT

## 2019-05-31 PROCEDURE — 87040 BLOOD CULTURE FOR BACTERIA: CPT

## 2019-05-31 PROCEDURE — 85027 COMPLETE CBC AUTOMATED: CPT

## 2019-05-31 PROCEDURE — 84100 ASSAY OF PHOSPHORUS: CPT

## 2019-05-31 PROCEDURE — 86850 RBC ANTIBODY SCREEN: CPT

## 2019-05-31 PROCEDURE — 85610 PROTHROMBIN TIME: CPT

## 2019-05-31 PROCEDURE — 86900 BLOOD TYPING SEROLOGIC ABO: CPT

## 2019-05-31 PROCEDURE — 80202 ASSAY OF VANCOMYCIN: CPT

## 2019-05-31 PROCEDURE — 82962 GLUCOSE BLOOD TEST: CPT

## 2019-05-31 PROCEDURE — 51702 INSERT TEMP BLADDER CATH: CPT

## 2019-05-31 PROCEDURE — 84132 ASSAY OF SERUM POTASSIUM: CPT

## 2019-05-31 PROCEDURE — 82330 ASSAY OF CALCIUM: CPT

## 2019-05-31 PROCEDURE — 97163 PT EVAL HIGH COMPLEX 45 MIN: CPT

## 2019-05-31 PROCEDURE — 99285 EMERGENCY DEPT VISIT HI MDM: CPT | Mod: 25

## 2019-05-31 PROCEDURE — 85045 AUTOMATED RETICULOCYTE COUNT: CPT

## 2019-05-31 PROCEDURE — 87150 DNA/RNA AMPLIFIED PROBE: CPT

## 2019-05-31 PROCEDURE — 87186 SC STD MICRODIL/AGAR DIL: CPT

## 2019-05-31 PROCEDURE — 82803 BLOOD GASES ANY COMBINATION: CPT

## 2019-05-31 PROCEDURE — 83735 ASSAY OF MAGNESIUM: CPT

## 2019-05-31 PROCEDURE — 82947 ASSAY GLUCOSE BLOOD QUANT: CPT

## 2019-05-31 PROCEDURE — 84295 ASSAY OF SERUM SODIUM: CPT

## 2019-05-31 PROCEDURE — 80053 COMPREHEN METABOLIC PANEL: CPT

## 2019-05-31 PROCEDURE — 94664 DEMO&/EVAL PT USE INHALER: CPT

## 2019-05-31 PROCEDURE — 87086 URINE CULTURE/COLONY COUNT: CPT

## 2019-05-31 PROCEDURE — 86901 BLOOD TYPING SEROLOGIC RH(D): CPT

## 2019-05-31 PROCEDURE — 87449 NOS EACH ORGANISM AG IA: CPT

## 2019-05-31 PROCEDURE — 71045 X-RAY EXAM CHEST 1 VIEW: CPT

## 2019-05-31 PROCEDURE — 93005 ELECTROCARDIOGRAM TRACING: CPT

## 2019-05-31 PROCEDURE — 96375 TX/PRO/DX INJ NEW DRUG ADDON: CPT | Mod: XU

## 2019-05-31 PROCEDURE — 85730 THROMBOPLASTIN TIME PARTIAL: CPT

## 2019-05-31 PROCEDURE — 87324 CLOSTRIDIUM AG IA: CPT

## 2019-05-31 PROCEDURE — 80048 BASIC METABOLIC PNL TOTAL CA: CPT

## 2019-05-31 PROCEDURE — 96374 THER/PROPH/DIAG INJ IV PUSH: CPT | Mod: XU

## 2019-05-31 PROCEDURE — 83605 ASSAY OF LACTIC ACID: CPT

## 2019-05-31 PROCEDURE — 94640 AIRWAY INHALATION TREATMENT: CPT

## 2019-05-31 PROCEDURE — 82435 ASSAY OF BLOOD CHLORIDE: CPT

## 2019-05-31 PROCEDURE — 81001 URINALYSIS AUTO W/SCOPE: CPT

## 2019-05-31 RX ORDER — INSULIN LISPRO 100/ML
VIAL (ML) SUBCUTANEOUS
Refills: 0 | Status: DISCONTINUED | OUTPATIENT
Start: 2019-05-31 | End: 2019-05-31

## 2019-05-31 RX ORDER — APIXABAN 2.5 MG/1
1 TABLET, FILM COATED ORAL
Qty: 0 | Refills: 0 | DISCHARGE
Start: 2019-05-31

## 2019-05-31 RX ORDER — SODIUM CHLORIDE 9 MG/ML
1000 INJECTION, SOLUTION INTRAVENOUS
Refills: 0 | Status: DISCONTINUED | OUTPATIENT
Start: 2019-05-31 | End: 2019-05-31

## 2019-05-31 RX ORDER — COLLAGENASE CLOSTRIDIUM HIST. 250 UNIT/G
1 OINTMENT (GRAM) TOPICAL
Qty: 0 | Refills: 0 | DISCHARGE
Start: 2019-05-31

## 2019-05-31 RX ORDER — DIPHENOXYLATE HCL/ATROPINE 2.5-.025MG
1 TABLET ORAL
Qty: 0 | Refills: 0 | DISCHARGE
Start: 2019-05-31

## 2019-05-31 RX ORDER — DEXTROSE 50 % IN WATER 50 %
25 SYRINGE (ML) INTRAVENOUS ONCE
Refills: 0 | Status: DISCONTINUED | OUTPATIENT
Start: 2019-05-31 | End: 2019-05-31

## 2019-05-31 RX ORDER — DIPHENOXYLATE HCL/ATROPINE 2.5-.025MG
5 TABLET ORAL
Qty: 0 | Refills: 0 | DISCHARGE
Start: 2019-05-31

## 2019-05-31 RX ORDER — COLLAGENASE CLOSTRIDIUM HIST. 250 UNIT/G
1 OINTMENT (GRAM) TOPICAL
Qty: 1 | Refills: 0
Start: 2019-05-31 | End: 2019-06-13

## 2019-05-31 RX ORDER — FAMOTIDINE 10 MG/ML
2.5 INJECTION INTRAVENOUS
Qty: 0 | Refills: 0 | DISCHARGE
Start: 2019-05-31

## 2019-05-31 RX ORDER — FAMOTIDINE 10 MG/ML
0 INJECTION INTRAVENOUS
Qty: 0 | Refills: 0 | DISCHARGE

## 2019-05-31 RX ORDER — FAMOTIDINE 10 MG/ML
1 INJECTION INTRAVENOUS
Qty: 0 | Refills: 0 | DISCHARGE
Start: 2019-05-31

## 2019-05-31 RX ORDER — GLUCAGON INJECTION, SOLUTION 0.5 MG/.1ML
1 INJECTION, SOLUTION SUBCUTANEOUS ONCE
Refills: 0 | Status: DISCONTINUED | OUTPATIENT
Start: 2019-05-31 | End: 2019-05-31

## 2019-05-31 RX ORDER — DEXTROSE 50 % IN WATER 50 %
12.5 SYRINGE (ML) INTRAVENOUS ONCE
Refills: 0 | Status: DISCONTINUED | OUTPATIENT
Start: 2019-05-31 | End: 2019-05-31

## 2019-05-31 RX ORDER — DEXTROSE 50 % IN WATER 50 %
15 SYRINGE (ML) INTRAVENOUS ONCE
Refills: 0 | Status: DISCONTINUED | OUTPATIENT
Start: 2019-05-31 | End: 2019-05-31

## 2019-05-31 RX ADMIN — Medication 1 TABLET(S): at 14:15

## 2019-05-31 RX ADMIN — Medication 1: at 14:03

## 2019-05-31 RX ADMIN — CEFEPIME 100 MILLIGRAM(S): 1 INJECTION, POWDER, FOR SOLUTION INTRAMUSCULAR; INTRAVENOUS at 06:24

## 2019-05-31 RX ADMIN — CHLORHEXIDINE GLUCONATE 1 APPLICATION(S): 213 SOLUTION TOPICAL at 07:44

## 2019-05-31 RX ADMIN — Medication 500 MILLIGRAM(S): at 14:17

## 2019-05-31 RX ADMIN — MIDODRINE HYDROCHLORIDE 10 MILLIGRAM(S): 2.5 TABLET ORAL at 14:06

## 2019-05-31 RX ADMIN — APIXABAN 5 MILLIGRAM(S): 2.5 TABLET, FILM COATED ORAL at 06:24

## 2019-05-31 RX ADMIN — ALBUTEROL 2.5 MILLIGRAM(S): 90 AEROSOL, METERED ORAL at 00:00

## 2019-05-31 RX ADMIN — Medication 50 MILLIGRAM(S): at 06:24

## 2019-05-31 RX ADMIN — ALBUTEROL 2.5 MILLIGRAM(S): 90 AEROSOL, METERED ORAL at 06:23

## 2019-05-31 RX ADMIN — Medication 1 TABLET(S): at 14:05

## 2019-05-31 RX ADMIN — TIZANIDINE 4 MILLIGRAM(S): 4 TABLET ORAL at 11:47

## 2019-05-31 RX ADMIN — MIDODRINE HYDROCHLORIDE 10 MILLIGRAM(S): 2.5 TABLET ORAL at 06:24

## 2019-05-31 RX ADMIN — GABAPENTIN 300 MILLIGRAM(S): 400 CAPSULE ORAL at 06:24

## 2019-05-31 RX ADMIN — Medication 0.5 MILLIGRAM(S): at 06:23

## 2019-05-31 RX ADMIN — FAMOTIDINE 20 MILLIGRAM(S): 10 INJECTION INTRAVENOUS at 14:05

## 2019-05-31 RX ADMIN — Medication 1 APPLICATION(S): at 16:14

## 2019-05-31 RX ADMIN — CHLORHEXIDINE GLUCONATE 1 APPLICATION(S): 213 SOLUTION TOPICAL at 14:48

## 2019-05-31 RX ADMIN — ALBUTEROL 2.5 MILLIGRAM(S): 90 AEROSOL, METERED ORAL at 14:04

## 2019-05-31 RX ADMIN — Medication 1 TABLET(S): at 14:04

## 2019-05-31 NOTE — PROGRESS NOTE ADULT - SUBJECTIVE AND OBJECTIVE BOX
---___---___---___---___---___---___ ---___---___---___---___---___---___---___---___---                  M E D I C A L   A T T E N D I N G   P R O G R E S S   N O T E  ---___---___---___---___---___---___ ---___---___---___---___---___---___---___---___---        ================================================    ++CHIEF COMPLAINT:   Patient is a 69y old  Female who presents with a chief complaint of pneumonia (30 May 2019 11:21)      Pneumonia    ---___---___---___---___---___---  PAST MEDICAL / Surgical  HISTORY:  PAST MEDICAL & SURGICAL HISTORY:  CVA (cerebral vascular accident)  Fatty pancreas  PNA (pneumonia)  Pulmonary HTN  IGT (impaired glucose tolerance)  Ulcerative colitis  Acid reflux  Anxiety  Depression  Mouth sores  HLD (hyperlipidemia)  Asthma  Humeral head fracture  H/O: hysterectomy  H/O cataract extraction, left  History of knee replacement      ---___---___---___---___---___---  FAMILY HISTORY:   FAMILY HISTORY:  Family history of dementia (Grandparent)  Family history of colon cancer (Grandparent)        ---___---___---___---___---___---  ALLERGIES:   Allergies    ASA; dye contrast (Anaphylaxis)  aspirin (Short breath)  divalproex sodium (Other (Unknown))  Flowers and Plants (Short breath)  Haldol (Other (Unknown))  penicillin (Short breath; Rash)  sulfa drugs (Short breath; Rash)  vancomycin (Rash; Urticaria; Hives)  Xanax (Other (Unknown))    Intolerances        ---___---___---___---___---___---  MEDICATIONS:  MEDICATIONS  (STANDING):  ALBUTerol    0.083% 2.5 milliGRAM(s) Nebulizer every 6 hours  ALBUTerol    90 MICROgram(s) HFA Inhaler 1 Puff(s) Inhalation every 4 hours  apixaban 5 milliGRAM(s) Oral every 12 hours  ascorbic acid 500 milliGRAM(s) Oral daily  Awake Gel Capsules 20:1 (THC:CBD) 2 Capsule(s) 2 Capsule(s) Oral <User Schedule>  buDESOnide   0.5 milliGRAM(s) Respule 0.5 milliGRAM(s) Inhalation every 12 hours  Calm Drops 50:1 (THC:CBD) 2 milliLiter(s) 2 milliLiter(s) Oral <User Schedule>  cefepime   IVPB 1000 milliGRAM(s) IV Intermittent every 12 hours  chlorhexidine 2% Cloths 1 Application(s) Topical daily  chlorhexidine 4% Liquid 1 Application(s) Topical <User Schedule>  clonazePAM  Tablet 1.5 milliGRAM(s) Oral at bedtime  collagenase Ointment 1 Application(s) Topical daily  dextrose 5%. 1000 milliLiter(s) (50 mL/Hr) IV Continuous <Continuous>  dextrose 50% Injectable 12.5 Gram(s) IV Push once  dextrose 50% Injectable 25 Gram(s) IV Push once  dextrose 50% Injectable 25 Gram(s) IV Push once  diphenoxylate/atropine 1 Tablet(s) Oral daily  famotidine   Suspension 20 milliGRAM(s) Oral daily  gabapentin   Solution 300 milliGRAM(s) Oral two times a day  [place] Drops 1:1 THC:CBD 3 milliLiter(s) 3 milliLiter(s) Oral <User Schedule>  insulin lispro (HumaLOG) corrective regimen sliding scale   SubCutaneous three times a day before meals  lactobacillus acidophilus 1 Tablet(s) Oral daily  midodrine 10 milliGRAM(s) Oral every 8 hours  multivitamin 1 Tablet(s) Oral daily  QUEtiapine 25 milliGRAM(s) Oral at bedtime  tiZANidine 4 milliGRAM(s) Oral <User Schedule>    MEDICATIONS  (PRN):  dextrose 40% Gel 15 Gram(s) Oral once PRN Blood Glucose LESS THAN 70 milliGRAM(s)/deciliter  glucagon  Injectable 1 milliGRAM(s) IntraMuscular once PRN Glucose LESS THAN 70 milligrams/deciliter      ---___---___---___---___---___---  REVIEW OF SYSTEM:  unable to obtain     ---___---___---___---___---___---  VITAL SIGNS:  69y , CAPILLARY BLOOD GLUCOSE        T(C): 36.5 (19 @ 06:00), Max: 37.5 (19 @ 21:05)  HR: 87 (19 @ 06:00) (87 - 114)  BP: 128/83 (19 @ 06:00) (115/87 - 145/86)  RR: 18 (19 @ 06:00) (18 - 18)  SpO2: 96% (19 @ 06:00) (96% - 100%)  ---___---___---___---___---___---  PHYSICAL EXAM:    GEN: A&O X 0 , NAD , comfortable  HEENT: NCAT, PERRL, MMM, hearing intact  Neck: supple , no JVD  CVS: S1S2 , regular , No M/R/G appreciated  PULM: CTA B/L,  no W/R/R appreciated  ABD.: soft. non tender, non distended,  bowel sounds present peg noted  gavin noted   Extrem: intact pulses , no edema   Derm: No rash , no ecchymoses stage 4 sacral decubitus wound to right leg noted         ---___---___---___---___---___---            LAB AND IMAGIN.1    17.97 )-----------( 385      ( 30 May 2019 09:08 )             25.8               05-31    138  |  101  |  23  ----------------------------<  213<H>  3.6   |  26  |  0.30<L>    Ca    8.3<L>      31 May 2019 06:24  Mg     1.7     05-30                                  [All pertinent / recent Imaging reviewed]         ---___---___---___---___---___---___ ---___---___---___---___---                         A S S E S S M E N T   A N D   P L A N :      HEALTH ISSUES - PROBLEM Dx:  Ulcerative proctitis without complication: Ulcerative proctitis without complication continue diphenoxylate  IGT (impaired glucose tolerance): IGT (impaired glucose tolerance)  give corretive scale while here but to restart metformin at home   Depression: Depression continue klonopin and seroquel  Anxiety: Anxiety  Sepsis, due to unspecified organism: Sepsis, due to unspecified organism on l; day  of abx  id cleared patient for dc home today   sacral decubitus wound care at home   -GI/DVT Prophylaxis.  asthma prednisone taper  dc midodrine   chf systolic ef 25 % continue lisinopril 5 and metorpolol 12.5 daily   xanafelx fro muscle contraction   isabel see patietn at home within one week    --------------------------------------------  Case discussed with   Education given on   ___________________________  Thank you,  Deniz Bolden  [phone]

## 2019-05-31 NOTE — DISCHARGE NOTE NURSING/CASE MANAGEMENT/SOCIAL WORK - NSDCPEWEB_GEN_ALL_CORE
[place] website --- [email or web address] for Tobacco [email or web address] website --- [email or web address]

## 2019-05-31 NOTE — DISCHARGE NOTE NURSING/CASE MANAGEMENT/SOCIAL WORK - NSDCDPATPORTLINK_GEN_ALL_CORE
You can access the [facility] Patient Portal, offered by [facility], by registering with the following website: [email or web address]

## 2019-05-31 NOTE — PHARMACOTHERAPY INTERVENTION NOTE - COMMENTS
recommend to dc cefepime and vanco after today's final doses - today is day 7
recommend to dc cefepime - s/p 7 days therapy

## 2019-05-31 NOTE — PROGRESS NOTE ADULT - PROVIDER SPECIALTY LIST ADULT
Family Medicine
Infectious Disease
MICU
MICU
Wound Care

## 2019-05-31 NOTE — PROGRESS NOTE ADULT - REASON FOR ADMISSION
pneumonia

## 2019-06-01 LAB
CULTURE RESULTS: SIGNIFICANT CHANGE UP
SPECIMEN SOURCE: SIGNIFICANT CHANGE UP

## 2019-06-06 ENCOUNTER — APPOINTMENT (OUTPATIENT)
Dept: WOUND CARE | Facility: CLINIC | Age: 69
End: 2019-06-06

## 2019-06-13 ENCOUNTER — INPATIENT (INPATIENT)
Facility: HOSPITAL | Age: 69
LOS: 5 days | Discharge: HOME | DRG: 871 | End: 2019-06-19
Attending: FAMILY MEDICINE | Admitting: FAMILY MEDICINE
Payer: MEDICARE

## 2019-06-13 VITALS
OXYGEN SATURATION: 95 % | DIASTOLIC BLOOD PRESSURE: 89 MMHG | HEART RATE: 99 BPM | SYSTOLIC BLOOD PRESSURE: 125 MMHG | WEIGHT: 100.09 LBS | RESPIRATION RATE: 16 BRPM

## 2019-06-13 DIAGNOSIS — S42.293A OTHER DISPLACED FRACTURE OF UPPER END OF UNSPECIFIED HUMERUS, INITIAL ENCOUNTER FOR CLOSED FRACTURE: Chronic | ICD-10-CM

## 2019-06-13 DIAGNOSIS — E11.9 TYPE 2 DIABETES MELLITUS WITHOUT COMPLICATIONS: ICD-10-CM

## 2019-06-13 DIAGNOSIS — Z71.89 OTHER SPECIFIED COUNSELING: ICD-10-CM

## 2019-06-13 DIAGNOSIS — R41.82 ALTERED MENTAL STATUS, UNSPECIFIED: ICD-10-CM

## 2019-06-13 DIAGNOSIS — Z29.9 ENCOUNTER FOR PROPHYLACTIC MEASURES, UNSPECIFIED: ICD-10-CM

## 2019-06-13 DIAGNOSIS — Z90.710 ACQUIRED ABSENCE OF BOTH CERVIX AND UTERUS: Chronic | ICD-10-CM

## 2019-06-13 DIAGNOSIS — F32.9 MAJOR DEPRESSIVE DISORDER, SINGLE EPISODE, UNSPECIFIED: ICD-10-CM

## 2019-06-13 DIAGNOSIS — Z96.659 PRESENCE OF UNSPECIFIED ARTIFICIAL KNEE JOINT: Chronic | ICD-10-CM

## 2019-06-13 DIAGNOSIS — Z98.42 CATARACT EXTRACTION STATUS, LEFT EYE: Chronic | ICD-10-CM

## 2019-06-13 DIAGNOSIS — A41.9 SEPSIS, UNSPECIFIED ORGANISM: ICD-10-CM

## 2019-06-13 DIAGNOSIS — N39.0 URINARY TRACT INFECTION, SITE NOT SPECIFIED: ICD-10-CM

## 2019-06-13 DIAGNOSIS — Z86.718 PERSONAL HISTORY OF OTHER VENOUS THROMBOSIS AND EMBOLISM: ICD-10-CM

## 2019-06-13 DIAGNOSIS — J45.909 UNSPECIFIED ASTHMA, UNCOMPLICATED: ICD-10-CM

## 2019-06-13 LAB
ALBUMIN SERPL ELPH-MCNC: 3.4 G/DL — SIGNIFICANT CHANGE UP (ref 3.3–5)
ALP SERPL-CCNC: 94 U/L — SIGNIFICANT CHANGE UP (ref 40–120)
ALT FLD-CCNC: 9 U/L — LOW (ref 10–45)
ANION GAP SERPL CALC-SCNC: 14 MMOL/L — SIGNIFICANT CHANGE UP (ref 5–17)
APPEARANCE UR: ABNORMAL
APTT BLD: 37.4 SEC — HIGH (ref 27.5–36.3)
AST SERPL-CCNC: 16 U/L — SIGNIFICANT CHANGE UP (ref 10–40)
BACTERIA # UR AUTO: ABNORMAL
BASE EXCESS BLDV CALC-SCNC: 4 MMOL/L — HIGH (ref -2–2)
BILIRUB SERPL-MCNC: 0.4 MG/DL — SIGNIFICANT CHANGE UP (ref 0.2–1.2)
BILIRUB UR-MCNC: NEGATIVE — SIGNIFICANT CHANGE UP
BUN SERPL-MCNC: 27 MG/DL — HIGH (ref 7–23)
CA-I SERPL-SCNC: 1.12 MMOL/L — SIGNIFICANT CHANGE UP (ref 1.12–1.3)
CALCIUM SERPL-MCNC: 9.3 MG/DL — SIGNIFICANT CHANGE UP (ref 8.4–10.5)
CHLORIDE BLDV-SCNC: 96 MMOL/L — SIGNIFICANT CHANGE UP (ref 96–108)
CHLORIDE SERPL-SCNC: 89 MMOL/L — LOW (ref 96–108)
CO2 BLDV-SCNC: 29 MMOL/L — SIGNIFICANT CHANGE UP (ref 22–30)
CO2 SERPL-SCNC: 27 MMOL/L — SIGNIFICANT CHANGE UP (ref 22–31)
COLOR SPEC: YELLOW — SIGNIFICANT CHANGE UP
CREAT SERPL-MCNC: 0.36 MG/DL — LOW (ref 0.5–1.3)
DIFF PNL FLD: ABNORMAL
EPI CELLS # UR: 1 /HPF — SIGNIFICANT CHANGE UP
GAS PNL BLDV: 127 MMOL/L — LOW (ref 136–145)
GAS PNL BLDV: SIGNIFICANT CHANGE UP
GLUCOSE BLDV-MCNC: 143 MG/DL — HIGH (ref 70–99)
GLUCOSE SERPL-MCNC: 195 MG/DL — HIGH (ref 70–99)
GLUCOSE UR QL: NEGATIVE — SIGNIFICANT CHANGE UP
HCO3 BLDV-SCNC: 27 MMOL/L — SIGNIFICANT CHANGE UP (ref 21–29)
HCT VFR BLD CALC: 32.3 % — LOW (ref 34.5–45)
HCT VFR BLDA CALC: 28 % — LOW (ref 39–50)
HGB BLD CALC-MCNC: 9.1 G/DL — LOW (ref 11.5–15.5)
HGB BLD-MCNC: 10.7 G/DL — LOW (ref 11.5–15.5)
HYALINE CASTS # UR AUTO: 1 /LPF — SIGNIFICANT CHANGE UP (ref 0–2)
INR BLD: 1.47 RATIO — HIGH (ref 0.88–1.16)
KETONES UR-MCNC: NEGATIVE — SIGNIFICANT CHANGE UP
LACTATE BLDV-MCNC: 1.3 MMOL/L — SIGNIFICANT CHANGE UP (ref 0.7–2)
LEUKOCYTE ESTERASE UR-ACNC: ABNORMAL
LYMPHOCYTES # BLD AUTO: 0.4 K/UL — LOW (ref 1–3.3)
LYMPHOCYTES # BLD AUTO: 5 % — LOW (ref 13–44)
MCHC RBC-ENTMCNC: 30.9 PG — SIGNIFICANT CHANGE UP (ref 27–34)
MCHC RBC-ENTMCNC: 33 GM/DL — SIGNIFICANT CHANGE UP (ref 32–36)
MCV RBC AUTO: 93.6 FL — SIGNIFICANT CHANGE UP (ref 80–100)
MONOCYTES # BLD AUTO: 0.6 K/UL — SIGNIFICANT CHANGE UP (ref 0–0.9)
MONOCYTES NFR BLD AUTO: 5 % — SIGNIFICANT CHANGE UP (ref 2–14)
NEUTROPHILS # BLD AUTO: 14.9 K/UL — HIGH (ref 1.8–7.4)
NEUTROPHILS NFR BLD AUTO: 90 % — HIGH (ref 43–77)
NITRITE UR-MCNC: NEGATIVE — SIGNIFICANT CHANGE UP
PCO2 BLDV: 38 MMHG — SIGNIFICANT CHANGE UP (ref 35–50)
PH BLDV: 7.47 — HIGH (ref 7.35–7.45)
PH UR: 6.5 — SIGNIFICANT CHANGE UP (ref 5–8)
PLATELET # BLD AUTO: 324 K/UL — SIGNIFICANT CHANGE UP (ref 150–400)
PO2 BLDV: 38 MMHG — SIGNIFICANT CHANGE UP (ref 25–45)
POTASSIUM BLDV-SCNC: 3.9 MMOL/L — SIGNIFICANT CHANGE UP (ref 3.5–5.3)
POTASSIUM SERPL-MCNC: 4.9 MMOL/L — SIGNIFICANT CHANGE UP (ref 3.5–5.3)
POTASSIUM SERPL-SCNC: 4.9 MMOL/L — SIGNIFICANT CHANGE UP (ref 3.5–5.3)
PROT SERPL-MCNC: 6.4 G/DL — SIGNIFICANT CHANGE UP (ref 6–8.3)
PROT UR-MCNC: ABNORMAL
PROTHROM AB SERPL-ACNC: 16.9 SEC — HIGH (ref 10–12.9)
RBC # BLD: 3.45 M/UL — LOW (ref 3.8–5.2)
RBC # FLD: 16.2 % — HIGH (ref 10.3–14.5)
RBC CASTS # UR COMP ASSIST: 5 /HPF — HIGH (ref 0–4)
SAO2 % BLDV: 72 % — SIGNIFICANT CHANGE UP (ref 67–88)
SODIUM SERPL-SCNC: 130 MMOL/L — LOW (ref 135–145)
SP GR SPEC: 1.01 — SIGNIFICANT CHANGE UP (ref 1.01–1.02)
UROBILINOGEN FLD QL: NEGATIVE — SIGNIFICANT CHANGE UP
WBC # BLD: 16 K/UL — HIGH (ref 3.8–10.5)
WBC # FLD AUTO: 16 K/UL — HIGH (ref 3.8–10.5)
WBC UR QL: 331 /HPF — HIGH (ref 0–5)

## 2019-06-13 PROCEDURE — 99497 ADVNCD CARE PLAN 30 MIN: CPT | Mod: 25

## 2019-06-13 PROCEDURE — 99285 EMERGENCY DEPT VISIT HI MDM: CPT | Mod: 25

## 2019-06-13 PROCEDURE — 93010 ELECTROCARDIOGRAM REPORT: CPT

## 2019-06-13 PROCEDURE — 99223 1ST HOSP IP/OBS HIGH 75: CPT

## 2019-06-13 PROCEDURE — 71045 X-RAY EXAM CHEST 1 VIEW: CPT | Mod: 26

## 2019-06-13 RX ORDER — DIPHENOXYLATE HCL/ATROPINE 2.5-.025MG
1 TABLET ORAL DAILY
Refills: 0 | Status: DISCONTINUED | OUTPATIENT
Start: 2019-06-13 | End: 2019-06-19

## 2019-06-13 RX ORDER — COLLAGENASE CLOSTRIDIUM HIST. 250 UNIT/G
1 OINTMENT (GRAM) TOPICAL DAILY
Refills: 0 | Status: DISCONTINUED | OUTPATIENT
Start: 2019-06-13 | End: 2019-06-19

## 2019-06-13 RX ORDER — DEXTROSE 50 % IN WATER 50 %
25 SYRINGE (ML) INTRAVENOUS ONCE
Refills: 0 | Status: DISCONTINUED | OUTPATIENT
Start: 2019-06-13 | End: 2019-06-19

## 2019-06-13 RX ORDER — VANCOMYCIN HCL 1 G
750 VIAL (EA) INTRAVENOUS ONCE
Refills: 0 | Status: COMPLETED | OUTPATIENT
Start: 2019-06-13 | End: 2019-06-13

## 2019-06-13 RX ORDER — DEXTROSE 50 % IN WATER 50 %
15 SYRINGE (ML) INTRAVENOUS ONCE
Refills: 0 | Status: DISCONTINUED | OUTPATIENT
Start: 2019-06-13 | End: 2019-06-19

## 2019-06-13 RX ORDER — MULTIVIT-MIN/FERROUS GLUCONATE 9 MG/15 ML
1 LIQUID (ML) ORAL DAILY
Refills: 0 | Status: DISCONTINUED | OUTPATIENT
Start: 2019-06-13 | End: 2019-06-19

## 2019-06-13 RX ORDER — SODIUM CHLORIDE 9 MG/ML
1000 INJECTION, SOLUTION INTRAVENOUS
Refills: 0 | Status: DISCONTINUED | OUTPATIENT
Start: 2019-06-13 | End: 2019-06-19

## 2019-06-13 RX ORDER — ALBUTEROL 90 UG/1
1 AEROSOL, METERED ORAL EVERY 4 HOURS
Refills: 0 | Status: DISCONTINUED | OUTPATIENT
Start: 2019-06-13 | End: 2019-06-14

## 2019-06-13 RX ORDER — BUDESONIDE, MICRONIZED 100 %
0.5 POWDER (GRAM) MISCELLANEOUS
Refills: 0 | Status: DISCONTINUED | OUTPATIENT
Start: 2019-06-13 | End: 2019-06-19

## 2019-06-13 RX ORDER — GLUCAGON INJECTION, SOLUTION 0.5 MG/.1ML
1 INJECTION, SOLUTION SUBCUTANEOUS ONCE
Refills: 0 | Status: DISCONTINUED | OUTPATIENT
Start: 2019-06-13 | End: 2019-06-19

## 2019-06-13 RX ORDER — APIXABAN 2.5 MG/1
5 TABLET, FILM COATED ORAL EVERY 12 HOURS
Refills: 0 | Status: DISCONTINUED | OUTPATIENT
Start: 2019-06-13 | End: 2019-06-19

## 2019-06-13 RX ORDER — TIZANIDINE 4 MG/1
4 TABLET ORAL
Refills: 0 | Status: DISCONTINUED | OUTPATIENT
Start: 2019-06-13 | End: 2019-06-19

## 2019-06-13 RX ORDER — DIPHENHYDRAMINE HCL 50 MG
25 CAPSULE ORAL ONCE
Refills: 0 | Status: COMPLETED | OUTPATIENT
Start: 2019-06-13 | End: 2019-06-13

## 2019-06-13 RX ORDER — GABAPENTIN 400 MG/1
300 CAPSULE ORAL
Refills: 0 | Status: DISCONTINUED | OUTPATIENT
Start: 2019-06-13 | End: 2019-06-19

## 2019-06-13 RX ORDER — VANCOMYCIN HCL 1 G
750 VIAL (EA) INTRAVENOUS EVERY 12 HOURS
Refills: 0 | Status: DISCONTINUED | OUTPATIENT
Start: 2019-06-13 | End: 2019-06-16

## 2019-06-13 RX ORDER — INSULIN LISPRO 100/ML
VIAL (ML) SUBCUTANEOUS EVERY 6 HOURS
Refills: 0 | Status: DISCONTINUED | OUTPATIENT
Start: 2019-06-13 | End: 2019-06-19

## 2019-06-13 RX ORDER — FERROUS SULFATE 325(65) MG
300 TABLET ORAL DAILY
Refills: 0 | Status: DISCONTINUED | OUTPATIENT
Start: 2019-06-13 | End: 2019-06-19

## 2019-06-13 RX ORDER — DEXTROSE 50 % IN WATER 50 %
12.5 SYRINGE (ML) INTRAVENOUS ONCE
Refills: 0 | Status: DISCONTINUED | OUTPATIENT
Start: 2019-06-13 | End: 2019-06-19

## 2019-06-13 RX ORDER — GABAPENTIN 400 MG/1
300 CAPSULE ORAL
Refills: 0 | Status: DISCONTINUED | OUTPATIENT
Start: 2019-06-13 | End: 2019-06-13

## 2019-06-13 RX ORDER — QUETIAPINE FUMARATE 200 MG/1
25 TABLET, FILM COATED ORAL AT BEDTIME
Refills: 0 | Status: DISCONTINUED | OUTPATIENT
Start: 2019-06-13 | End: 2019-06-19

## 2019-06-13 RX ORDER — CEFEPIME 1 G/1
1000 INJECTION, POWDER, FOR SOLUTION INTRAMUSCULAR; INTRAVENOUS EVERY 12 HOURS
Refills: 0 | Status: DISCONTINUED | OUTPATIENT
Start: 2019-06-13 | End: 2019-06-14

## 2019-06-13 RX ORDER — CLONAZEPAM 1 MG
1.5 TABLET ORAL AT BEDTIME
Refills: 0 | Status: DISCONTINUED | OUTPATIENT
Start: 2019-06-13 | End: 2019-06-19

## 2019-06-13 RX ORDER — SODIUM CHLORIDE 9 MG/ML
1400 INJECTION INTRAMUSCULAR; INTRAVENOUS; SUBCUTANEOUS ONCE
Refills: 0 | Status: COMPLETED | OUTPATIENT
Start: 2019-06-13 | End: 2019-06-13

## 2019-06-13 RX ORDER — IPRATROPIUM/ALBUTEROL SULFATE 18-103MCG
3 AEROSOL WITH ADAPTER (GRAM) INHALATION EVERY 12 HOURS
Refills: 0 | Status: DISCONTINUED | OUTPATIENT
Start: 2019-06-13 | End: 2019-06-19

## 2019-06-13 RX ORDER — FAMOTIDINE 10 MG/ML
20 INJECTION INTRAVENOUS DAILY
Refills: 0 | Status: DISCONTINUED | OUTPATIENT
Start: 2019-06-13 | End: 2019-06-19

## 2019-06-13 RX ADMIN — GABAPENTIN 300 MILLIGRAM(S): 400 CAPSULE ORAL at 23:15

## 2019-06-13 RX ADMIN — QUETIAPINE FUMARATE 25 MILLIGRAM(S): 200 TABLET, FILM COATED ORAL at 23:15

## 2019-06-13 RX ADMIN — TIZANIDINE 4 MILLIGRAM(S): 4 TABLET ORAL at 23:15

## 2019-06-13 RX ADMIN — Medication 62.5 MILLIGRAM(S): at 17:56

## 2019-06-13 RX ADMIN — SODIUM CHLORIDE 1400 MILLILITER(S): 9 INJECTION INTRAMUSCULAR; INTRAVENOUS; SUBCUTANEOUS at 15:30

## 2019-06-13 RX ADMIN — Medication 1.5 MILLIGRAM(S): at 23:15

## 2019-06-13 RX ADMIN — Medication 25 MILLIGRAM(S): at 16:38

## 2019-06-13 RX ADMIN — CEFEPIME 100 MILLIGRAM(S): 1 INJECTION, POWDER, FOR SOLUTION INTRAMUSCULAR; INTRAVENOUS at 23:48

## 2019-06-13 NOTE — ED ADULT NURSE REASSESSMENT NOTE - NS ED NURSE REASSESS COMMENT FT1
Report received from EDISON Hamilton. Pt nonverbal at baseline resting comfortably in bed in no apparent distress. VSS. Pt with SpO2 of 99% RA, family at bedside requesting patient to be on 2L NC since they have her on that at home as needed for comfort. Urine samples obtained from gavin catheter and sent to lab. Safety measures maintained. Bed in lowest position. Family educated on call bell system,

## 2019-06-13 NOTE — ED PROVIDER NOTE - PHYSICAL EXAMINATION
GEN: Pt in NAD, chronically ill, non-verbal, eyes track to movement.  PSYCH: Affect appropriate.  EYES: Sclera white w/o injection, PERRLA.   ENT: Head NCAT. Nose w/o deformity. No auricular TTP. MMM. Neck supple FROM.   RESP: No chest wall tenderness, CTA b/l, no wheezes, rales, or rhonchi.   CARDIAC: RRR, clear distinct S1, S2.  ABD: Abdomen soft, non-tender. No CVAT b/l. Dark yellow urine in leg bag.  VASC: 2+ radial and dorsalis pedis pulses b/l. No edema or calf tenderness.  MSK: Baseline contracture of LE.  SKIN: Small unstageable decubitus ulcers thoracolumbar area, no surroudning erythema or DC, +granulation tissue. Large sacral decubitus ulcer no surrounding erythema, no bleeding or DC, instagaeble.

## 2019-06-13 NOTE — H&P ADULT - NSHPREVIEWOFSYSTEMS_GEN_ALL_CORE
CONSTITUTIONAL: Unable to obtain due to clinical condition  EYES/ENT: Unable to obtain due to clinical condition  NECK: Unable to obtain due to clinical condition  RESPIRATORY: Unable to obtain due to clinical condition  CARDIOVASCULAR: Unable to obtain due to clinical condition  EXTREMITIES: Unable to obtain due to clinical condition  MUSCULOSKELETAL: Unable to obtain due to clinical condition  GASTROINTESTINAL: Unable to obtain due to clinical condition.  BACK: Unable to obtain due to clinical condition  GENITOURINARY: Unable to obtain due to clinical condition  NEUROLOGICAL: Unable to obtain due to clinical condition  SKIN: Unable to obtain due to clinical condition  PSYCH: Unable to obtain due to clinical condition

## 2019-06-13 NOTE — H&P ADULT - HISTORY OF PRESENT ILLNESS
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  Patient was in septic shock, was transferred to MICU, was treated with IV vancomycin and cefepime and flagyl for MRSA infection and possible aspiration pneumonia.  Patient was discharged with prednisone taper. 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  Patient was in septic shock, was transferred to MICU, was treated with IV vancomycin and cefepime and flagyl for MRSA infection and possible aspiration pneumonia.  Patient was discharged with prednisone taper. 70 yo female with multiple prolonged hospitalization, PMH of Advanced dementia (nonverbal, dysphagia, with PEG tube, functional quadriplegic, chronic gavin catheter) sacral state 4 pressure ulcer, heel pressure ulcers, asthma on chronic prednsione, HLD, CVA, severe lordosis/kyphoscoliosis, 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  During last admission, patient was in septic shock, was transferred to MICU, was treated with IV vancomycin and cefepime and flagyl for MRSA infection and possible aspiration pneumonia.  Patient was discharged with prednisone taper.  Since discharge, patient has been in her usual state of health.  Over the last few days, family noticed that she is more lethargic, less communicative.  At baseline, she makes eye contact, smiles, occasionally follows some commands, but she is nonverbal.  Family also noticed fever for last few days, Tmax being 102.  Her urine in the gavin bag looked dark in color and less in amount.  At that point, family thought that she had UTI.  They spoke to PCP who recommended coming to ED.  No report of vomiting, cough, diarrhea.

## 2019-06-13 NOTE — ED ADULT NURSE NOTE - OBJECTIVE STATEMENT
68 y/o F, reported to ED from home via EMS. A&Ox0,  says that the pt is febrile. Pt's  reports that the pt is non-verbal at baseline and is bed bound. Pt had a temp of 101.9 rectally. Pt was given Tylenol via her PEG tube this morning. Pt has rectal temp of 98.9 rectal upon arrival to ED. Pt had a loose BM prior to arrival to ED which was cleaned by RN and Tech. Pt has a broken left leg which is in a knee immobilizing cast. Pt's right leg is bent and contracted to her body. Pt has a pillow in between to prevent skin breakdown. Pt has 2 large unstageable wounds on her middle back and on her sacrum area. Pt's lower extremity is twisted and her body anatomy is hard to distinguish due to her contracted body. Pt's left upper arm is very stiff and contracted upward towards her chest. Pt's fingers are clenched and according to her family she is unable to straighten them. Pt has a PEG tube which family states was used earlier today. Pt also has a gavin catheter which was inserted on 6/2/19. Pt has been bedbound for the past 2 years as per the pt's . Pt has a 20 G in her left wrist from EMS. According to family the pt is "less responsive than normal."  and daughters at pt's bedside. Will continue to monitor pt.

## 2019-06-13 NOTE — H&P ADULT - NSHPLABSRESULTS_GEN_ALL_CORE
Labs personally reviewed:                          10.7   16.0  )-----------( 324      ( 2019 14:30 )             32.3     06-13    130<L>  |  89<L>  |  27<H>  ----------------------------<  195<H>  4.9   |  27  |  0.36<L>    Ca    9.3      2019 14:30    TPro  6.4  /  Alb  3.4  /  TBili  0.4  /  DBili  x   /  AST  16  /  ALT  9<L>  /  AlkPhos  94  06-13        LIVER FUNCTIONS - ( 2019 14:30 )  Alb: 3.4 g/dL / Pro: 6.4 g/dL / ALK PHOS: 94 U/L / ALT: 9 U/L / AST: 16 U/L / GGT: x           PT/INR - ( 2019 14:30 )   PT: 16.9 sec;   INR: 1.47 ratio         PTT - ( 2019 14:30 )  PTT:37.4 sec  Urinalysis Basic - ( 2019 15:54 )    Color: Yellow / Appearance: Slightly Turbid / S.015 / pH: x  Gluc: x / Ketone: Negative  / Bili: Negative / Urobili: Negative   Blood: x / Protein: Trace / Nitrite: Negative   Leuk Esterase: Large / RBC: 5 /hpf /  /HPF   Sq Epi: x / Non Sq Epi: 1 /hpf / Bacteria: Moderate      CAPILLARY BLOOD GLUCOSE          Imaging:  CXR personally reviewed: b/l linear atelectasis    EKG personally reviewed: sinus tachycardia

## 2019-06-13 NOTE — H&P ADULT - PROBLEM SELECTOR PLAN 1
Patient with increased lethargy, less communicative, though not far from baseline.  Per family, mental status improved.  Given fever, positive UA, could be from UTI;   hold off on CT brain for now unless mental status worsens  c/w IV abx for now

## 2019-06-13 NOTE — H&P ADULT - ASSESSMENT
70 yo female with multiple prolonged hospitalization, PMH of Advanced dementia (nonverbal, dysphagia, with PEG tube, functional quadriplegic, chronic gavin catheter) sacral state 4 pressure ulcer, heel pressure ulcers, asthma on chronic prednsione, HLD, CVA, severe lordosis/kyphoscoliosis, 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

## 2019-06-13 NOTE — H&P ADULT - NSHPPHYSICALEXAM_GEN_ALL_CORE
PHYSICAL EXAM:  Vital Signs Last 24 Hrs  T(C): 37.1 (06-13-19 @ 21:01)  T(F): 98.7 (06-13-19 @ 21:01), Max: 98.9 (06-13-19 @ 14:35)  HR: 99 (06-13-19 @ 21:01) (82 - 99)  BP: 148/75 (06-13-19 @ 21:01)  BP(mean): --  RR: 17 (06-13-19 @ 21:01) (16 - 17)  SpO2: 100% (06-13-19 @ 21:01) (95% - 100%)  Wt(kg): --    	GENERAL: NAD, lying in bed, functional quadriplegic, frail with contracted extremities  	HEAD:  Atraumatic, Normocephalic  	EYES: EOMI, PERRLA, conjunctiva and sclera clear  	ENMT: No oropharyngeal exudates, dry mucous membranes  	NECK: Supple, no JVD  	NERVOUS SYSTEM:  Alert & Oriented X 0, does not follow commands, awake  	CHEST/LUNG: Clear to auscultation bilaterally on auscultation; No rales, no  rhonchi, no wheezing  	HEART: Regular rhythm; tachycardic,  No murmurs  	ABDOMEN: Soft, Nontender, Nondistended, PEG tube in place without surrounding erythema or exudates.   	EXTREMITIES:  2+ Peripheral Pulses, +cast in place left lower extremity; contracted upper and lower extremities,   	R hip superficial wound with surrounding erythema and purulent drainage  	R ankle wound with surrounding erythema and purulent drainage  	LYMPH: No lymphadenopathy noted  SKIN: Large stage 4 sacral ulcer with minimal surrounding erythema and edema. Bone not visible. Some granulation tissue forming. No exudates.

## 2019-06-13 NOTE — H&P ADULT - PROBLEM SELECTOR PLAN 9
I had a 16 minute discussion with patient's  and daughters by bedside.  Previous documented DNI, but apparently they never agreed to that.  I had discussed all resuscitative measures and they verbalized understanding.  At this time, they would like patient to be FULL CODE.

## 2019-06-13 NOTE — ED PROVIDER NOTE - PROGRESS NOTE DETAILS
d/w Dr. Bolden, who states he will take admission, requests starting vanc instead of FQ, attempted to clarify how Vanc was dosed as pt has documented allergy, states it should be run as it was on previous admission 750mg over 4 hours with benadryl prior to admission. Would appreciate ID consult with Dr. Brand.  Abx delay due to multiple pt allergies, uncertain source of infx, and need to coordinate care with inpt team to determine dosing and administration for Vanc. -Leon Ruggiero PA-C lo Brand. -Leon Ruggiero PA-C

## 2019-06-13 NOTE — ED PROVIDER NOTE - CLINICAL SUMMARY MEDICAL DECISION MAKING FREE TEXT BOX
Jose: 69 year old female with fever, dementia, here with fever this am. noted to have darker urine than usual. will get labs, ua, urine culture, ivf, iv abx, admit to hospital.

## 2019-06-13 NOTE — H&P ADULT - PROBLEM SELECTOR PLAN 3
s/p 1.4L NS  f/u blood culture, f/u urine culture  lactate improved from 3.2 --> 1.3  c/w IV vancomycin (750mg/4h q12) and IV cefepime  ID consult in AM  sacral wound does not appear to be infected  will get wound care consult

## 2019-06-14 ENCOUNTER — TRANSCRIPTION ENCOUNTER (OUTPATIENT)
Age: 69
End: 2019-06-14

## 2019-06-14 LAB
ALBUMIN SERPL ELPH-MCNC: 3 G/DL — LOW (ref 3.3–5)
ALP SERPL-CCNC: 81 U/L — SIGNIFICANT CHANGE UP (ref 40–120)
ALT FLD-CCNC: 8 U/L — LOW (ref 10–45)
ANION GAP SERPL CALC-SCNC: 15 MMOL/L — SIGNIFICANT CHANGE UP (ref 5–17)
AST SERPL-CCNC: 12 U/L — SIGNIFICANT CHANGE UP (ref 10–40)
BASOPHILS # BLD AUTO: 0.02 K/UL — SIGNIFICANT CHANGE UP (ref 0–0.2)
BASOPHILS NFR BLD AUTO: 0.1 % — SIGNIFICANT CHANGE UP (ref 0–2)
BILIRUB SERPL-MCNC: 0.6 MG/DL — SIGNIFICANT CHANGE UP (ref 0.2–1.2)
BUN SERPL-MCNC: 23 MG/DL — SIGNIFICANT CHANGE UP (ref 7–23)
CALCIUM SERPL-MCNC: 9 MG/DL — SIGNIFICANT CHANGE UP (ref 8.4–10.5)
CHLORIDE SERPL-SCNC: 93 MMOL/L — LOW (ref 96–108)
CO2 SERPL-SCNC: 23 MMOL/L — SIGNIFICANT CHANGE UP (ref 22–31)
CREAT SERPL-MCNC: 0.35 MG/DL — LOW (ref 0.5–1.3)
EOSINOPHIL # BLD AUTO: 0.39 K/UL — SIGNIFICANT CHANGE UP (ref 0–0.5)
EOSINOPHIL NFR BLD AUTO: 2 % — SIGNIFICANT CHANGE UP (ref 0–6)
GLUCOSE BLDC GLUCOMTR-MCNC: 117 MG/DL — HIGH (ref 70–99)
GLUCOSE BLDC GLUCOMTR-MCNC: 120 MG/DL — HIGH (ref 70–99)
GLUCOSE BLDC GLUCOMTR-MCNC: 153 MG/DL — HIGH (ref 70–99)
GLUCOSE BLDC GLUCOMTR-MCNC: 165 MG/DL — HIGH (ref 70–99)
GLUCOSE BLDC GLUCOMTR-MCNC: 210 MG/DL — HIGH (ref 70–99)
GLUCOSE BLDC GLUCOMTR-MCNC: 267 MG/DL — HIGH (ref 70–99)
GLUCOSE SERPL-MCNC: 123 MG/DL — HIGH (ref 70–99)
GRAM STN FLD: SIGNIFICANT CHANGE UP
HBA1C BLD-MCNC: 5 % — SIGNIFICANT CHANGE UP (ref 4–5.6)
HCT VFR BLD CALC: 32.2 % — LOW (ref 34.5–45)
HGB BLD-MCNC: 10 G/DL — LOW (ref 11.5–15.5)
IMM GRANULOCYTES NFR BLD AUTO: 0.6 % — SIGNIFICANT CHANGE UP (ref 0–1.5)
LACTATE SERPL-SCNC: 1.4 MMOL/L — SIGNIFICANT CHANGE UP (ref 0.7–2)
LYMPHOCYTES # BLD AUTO: 0.29 K/UL — LOW (ref 1–3.3)
LYMPHOCYTES # BLD AUTO: 1.5 % — LOW (ref 13–44)
MAGNESIUM SERPL-MCNC: 1.7 MG/DL — SIGNIFICANT CHANGE UP (ref 1.6–2.6)
MCHC RBC-ENTMCNC: 29.4 PG — SIGNIFICANT CHANGE UP (ref 27–34)
MCHC RBC-ENTMCNC: 31.1 GM/DL — LOW (ref 32–36)
MCV RBC AUTO: 94.7 FL — SIGNIFICANT CHANGE UP (ref 80–100)
MONOCYTES # BLD AUTO: 0.43 K/UL — SIGNIFICANT CHANGE UP (ref 0–0.9)
MONOCYTES NFR BLD AUTO: 2.2 % — SIGNIFICANT CHANGE UP (ref 2–14)
NEUTROPHILS # BLD AUTO: 18.12 K/UL — HIGH (ref 1.8–7.4)
NEUTROPHILS NFR BLD AUTO: 93.6 % — HIGH (ref 43–77)
PHOSPHATE SERPL-MCNC: 2.5 MG/DL — SIGNIFICANT CHANGE UP (ref 2.5–4.5)
PLATELET # BLD AUTO: 377 K/UL — SIGNIFICANT CHANGE UP (ref 150–400)
POTASSIUM SERPL-MCNC: 4.2 MMOL/L — SIGNIFICANT CHANGE UP (ref 3.5–5.3)
POTASSIUM SERPL-SCNC: 4.2 MMOL/L — SIGNIFICANT CHANGE UP (ref 3.5–5.3)
PROT SERPL-MCNC: 5.8 G/DL — LOW (ref 6–8.3)
RAPID RVP RESULT: SIGNIFICANT CHANGE UP
RBC # BLD: 3.4 M/UL — LOW (ref 3.8–5.2)
RBC # FLD: 16.9 % — HIGH (ref 10.3–14.5)
SODIUM SERPL-SCNC: 131 MMOL/L — LOW (ref 135–145)
WBC # BLD: 19.37 K/UL — HIGH (ref 3.8–10.5)
WBC # FLD AUTO: 19.37 K/UL — HIGH (ref 3.8–10.5)

## 2019-06-14 PROCEDURE — 99222 1ST HOSP IP/OBS MODERATE 55: CPT

## 2019-06-14 PROCEDURE — 99232 SBSQ HOSP IP/OBS MODERATE 35: CPT

## 2019-06-14 RX ORDER — NYSTATIN 500MM UNIT
0 POWDER (EA) MISCELLANEOUS
Qty: 0 | Refills: 0 | DISCHARGE

## 2019-06-14 RX ORDER — CEFEPIME 1 G/1
INJECTION, POWDER, FOR SOLUTION INTRAMUSCULAR; INTRAVENOUS
Refills: 0 | Status: DISCONTINUED | OUTPATIENT
Start: 2019-06-14 | End: 2019-06-19

## 2019-06-14 RX ORDER — ASCORBIC ACID 60 MG
500 TABLET,CHEWABLE ORAL DAILY
Refills: 0 | Status: DISCONTINUED | OUTPATIENT
Start: 2019-06-14 | End: 2019-06-14

## 2019-06-14 RX ORDER — NYSTATIN CREAM 100000 [USP'U]/G
1 CREAM TOPICAL THREE TIMES A DAY
Refills: 0 | Status: DISCONTINUED | OUTPATIENT
Start: 2019-06-14 | End: 2019-06-19

## 2019-06-14 RX ORDER — ZINC SULFATE TAB 220 MG (50 MG ZINC EQUIVALENT) 220 (50 ZN) MG
220 TAB ORAL DAILY
Refills: 0 | Status: DISCONTINUED | OUTPATIENT
Start: 2019-06-14 | End: 2019-06-19

## 2019-06-14 RX ORDER — ACETAMINOPHEN 500 MG
975 TABLET ORAL ONCE
Refills: 0 | Status: COMPLETED | OUTPATIENT
Start: 2019-06-14 | End: 2019-06-14

## 2019-06-14 RX ORDER — SODIUM CHLORIDE 9 MG/ML
250 INJECTION INTRAMUSCULAR; INTRAVENOUS; SUBCUTANEOUS ONCE
Refills: 0 | Status: COMPLETED | OUTPATIENT
Start: 2019-06-14 | End: 2019-06-14

## 2019-06-14 RX ORDER — CEFEPIME 1 G/1
1000 INJECTION, POWDER, FOR SOLUTION INTRAMUSCULAR; INTRAVENOUS ONCE
Refills: 0 | Status: COMPLETED | OUTPATIENT
Start: 2019-06-14 | End: 2019-06-14

## 2019-06-14 RX ORDER — ASCORBIC ACID 60 MG
500 TABLET,CHEWABLE ORAL DAILY
Refills: 0 | Status: DISCONTINUED | OUTPATIENT
Start: 2019-06-14 | End: 2019-06-19

## 2019-06-14 RX ORDER — CHLORHEXIDINE GLUCONATE 213 G/1000ML
1 SOLUTION TOPICAL DAILY
Refills: 0 | Status: DISCONTINUED | OUTPATIENT
Start: 2019-06-14 | End: 2019-06-19

## 2019-06-14 RX ORDER — ACETAMINOPHEN 500 MG
1000 TABLET ORAL ONCE
Refills: 0 | Status: DISCONTINUED | OUTPATIENT
Start: 2019-06-14 | End: 2019-06-14

## 2019-06-14 RX ORDER — DIPHENHYDRAMINE HCL 50 MG
25 CAPSULE ORAL EVERY 12 HOURS
Refills: 0 | Status: DISCONTINUED | OUTPATIENT
Start: 2019-06-14 | End: 2019-06-19

## 2019-06-14 RX ORDER — CEFEPIME 1 G/1
1000 INJECTION, POWDER, FOR SOLUTION INTRAMUSCULAR; INTRAVENOUS EVERY 8 HOURS
Refills: 0 | Status: DISCONTINUED | OUTPATIENT
Start: 2019-06-14 | End: 2019-06-19

## 2019-06-14 RX ORDER — ACETAMINOPHEN 500 MG
650 TABLET ORAL ONCE
Refills: 0 | Status: COMPLETED | OUTPATIENT
Start: 2019-06-14 | End: 2019-06-14

## 2019-06-14 RX ORDER — MIDODRINE HYDROCHLORIDE 2.5 MG/1
10 TABLET ORAL ONCE
Refills: 0 | Status: COMPLETED | OUTPATIENT
Start: 2019-06-14 | End: 2019-06-14

## 2019-06-14 RX ORDER — ACETAMINOPHEN 500 MG
650 TABLET ORAL ONCE
Refills: 0 | Status: DISCONTINUED | OUTPATIENT
Start: 2019-06-14 | End: 2019-06-14

## 2019-06-14 RX ORDER — ERGOCALCIFEROL 1.25 MG/1
1 CAPSULE ORAL
Qty: 0 | Refills: 0 | DISCHARGE

## 2019-06-14 RX ORDER — DIPHENHYDRAMINE HCL 50 MG
25 CAPSULE ORAL ONCE
Refills: 0 | Status: COMPLETED | OUTPATIENT
Start: 2019-06-14 | End: 2019-06-14

## 2019-06-14 RX ORDER — COLLAGENASE CLOSTRIDIUM HIST. 250 UNIT/G
1 OINTMENT (GRAM) TOPICAL DAILY
Refills: 0 | Status: DISCONTINUED | OUTPATIENT
Start: 2019-06-14 | End: 2019-06-19

## 2019-06-14 RX ADMIN — Medication 25 MILLIGRAM(S): at 18:00

## 2019-06-14 RX ADMIN — Medication 7.5 MILLIGRAM(S): at 05:06

## 2019-06-14 RX ADMIN — Medication 62.5 MILLIGRAM(S): at 17:40

## 2019-06-14 RX ADMIN — Medication 62.5 MILLIGRAM(S): at 06:52

## 2019-06-14 RX ADMIN — CEFEPIME 100 MILLIGRAM(S): 1 INJECTION, POWDER, FOR SOLUTION INTRAMUSCULAR; INTRAVENOUS at 10:40

## 2019-06-14 RX ADMIN — Medication 1.5 MILLIGRAM(S): at 22:31

## 2019-06-14 RX ADMIN — CHLORHEXIDINE GLUCONATE 1 APPLICATION(S): 213 SOLUTION TOPICAL at 13:40

## 2019-06-14 RX ADMIN — TIZANIDINE 4 MILLIGRAM(S): 4 TABLET ORAL at 20:29

## 2019-06-14 RX ADMIN — SODIUM CHLORIDE 750 MILLILITER(S): 9 INJECTION INTRAMUSCULAR; INTRAVENOUS; SUBCUTANEOUS at 15:12

## 2019-06-14 RX ADMIN — GABAPENTIN 300 MILLIGRAM(S): 400 CAPSULE ORAL at 08:50

## 2019-06-14 RX ADMIN — Medication 300 MILLIGRAM(S): at 13:40

## 2019-06-14 RX ADMIN — Medication 1 APPLICATION(S): at 17:37

## 2019-06-14 RX ADMIN — Medication 1 TABLET(S): at 13:40

## 2019-06-14 RX ADMIN — Medication 1 DROP(S): at 13:41

## 2019-06-14 RX ADMIN — Medication 2: at 14:02

## 2019-06-14 RX ADMIN — Medication 0.5 MILLIGRAM(S): at 17:46

## 2019-06-14 RX ADMIN — Medication 650 MILLIGRAM(S): at 08:46

## 2019-06-14 RX ADMIN — Medication 1 TABLET(S): at 13:39

## 2019-06-14 RX ADMIN — QUETIAPINE FUMARATE 25 MILLIGRAM(S): 200 TABLET, FILM COATED ORAL at 22:31

## 2019-06-14 RX ADMIN — Medication 1 DROP(S): at 22:32

## 2019-06-14 RX ADMIN — TIZANIDINE 4 MILLIGRAM(S): 4 TABLET ORAL at 08:48

## 2019-06-14 RX ADMIN — Medication 975 MILLIGRAM(S): at 06:50

## 2019-06-14 RX ADMIN — SODIUM CHLORIDE 750 MILLILITER(S): 9 INJECTION INTRAMUSCULAR; INTRAVENOUS; SUBCUTANEOUS at 17:08

## 2019-06-14 RX ADMIN — Medication 650 MILLIGRAM(S): at 10:39

## 2019-06-14 RX ADMIN — Medication 1 APPLICATION(S): at 13:38

## 2019-06-14 RX ADMIN — APIXABAN 5 MILLIGRAM(S): 2.5 TABLET, FILM COATED ORAL at 08:47

## 2019-06-14 RX ADMIN — NYSTATIN CREAM 1 APPLICATION(S): 100000 CREAM TOPICAL at 22:34

## 2019-06-14 RX ADMIN — APIXABAN 5 MILLIGRAM(S): 2.5 TABLET, FILM COATED ORAL at 19:18

## 2019-06-14 RX ADMIN — Medication 3 MILLILITER(S): at 17:46

## 2019-06-14 RX ADMIN — FAMOTIDINE 20 MILLIGRAM(S): 10 INJECTION INTRAVENOUS at 13:37

## 2019-06-14 RX ADMIN — Medication 25 MILLIGRAM(S): at 05:06

## 2019-06-14 RX ADMIN — CEFEPIME 100 MILLIGRAM(S): 1 INJECTION, POWDER, FOR SOLUTION INTRAMUSCULAR; INTRAVENOUS at 17:40

## 2019-06-14 RX ADMIN — MIDODRINE HYDROCHLORIDE 10 MILLIGRAM(S): 2.5 TABLET ORAL at 14:47

## 2019-06-14 RX ADMIN — Medication 1: at 00:55

## 2019-06-14 RX ADMIN — GABAPENTIN 300 MILLIGRAM(S): 400 CAPSULE ORAL at 20:29

## 2019-06-14 RX ADMIN — Medication 975 MILLIGRAM(S): at 07:20

## 2019-06-14 NOTE — DIETITIAN INITIAL EVALUATION ADULT. - NS AS NUTRI INTERV ENTERAL NUTRITION
Recommend Glucerna 1.2 through bolus feedings via PEG at 240 ml every 6 hours (4xday) to provide 960 ml, 1152 kcal, 58 g protein, and 773 ml water (25 kcal/kg and 1.3 g/kg protein based on dosing weight 45.4 kg); add 45 ml of water before and after each feeding (total 1133 ml water/day).

## 2019-06-14 NOTE — CHART NOTE - NSCHARTNOTEFT_GEN_A_CORE
MEDICINE NP  Patient is a 69y old  Female who presents with a chief complaint of fever and lethargy (13 Jun 2019 22:06)      Event Summary:   Notified by RN patient with fever, Temp- .   Patient seen and examined patient at bedside.  Patient is alert, denies HA, visual changes, CP, SOB, cough, N/V, dysuria, or abd pain.    >VITAL SIGNS:  T(C): 39.5 (06-14-19 @ 06:20), Max: 39.5 (06-14-19 @ 06:20)  T(F): 103.1 (06-14-19 @ 06:20), Max: 103.1 (06-14-19 @ 06:20)  HR: 106 (06-14-19 @ 06:20) (82 - 106)  BP: 104/50 (06-14-19 @ 06:50) (98/52 - 148/75)  RR: 18 (06-14-19 @ 06:20) (16 - 18)  SpO2: 95% (06-14-19 @ 06:20) (95% - 100%)      >Review Of Systems:   CONSTITUTIONAL:  No fever.   No chills  EYES: No visual changes.    ENT:  No  throat pain.  No neck stiffness  RESPIRATORY: No cough, wheezing, hemoptysis; No shortness of breath  CARDIOVASCULAR: No chest pain or palpitations  GASTROINTESTINAL: No abdominal or epigastric pain.  No diarrhea.    GENITOURINARY: No dysuria, frequency or hematuria  NEUROLOGICAL: No numbness or weakness  SKIN: No itching, burning, rashes, or lesions       >PHYSICAL EXAM:  Constitutional: AOx3. NAD.  Neuro:  Grossly intact   Mouth:   Cardiovascular: +S1 S2. RRR.  No murmurs.  No LE edema.  Respiratory:  Even, unlabored.  Clear lungs bilaterally. No wheezing, rhonchi, or crackles.  Gastrointestinal: +BS X4 active. Soft. Nontender. Nondistended   Extremities/Vascular: +2 pulses bilaterally.   Skin:  +Feverish to touch     >MEDICATIONS:    MEDICATIONS  (STANDING):  acetaminophen  IVPB .. 650 milliGRAM(s) IV Intermittent once  ALBUTerol    90 MICROgram(s) HFA Inhaler 1 Puff(s) Inhalation every 4 hours  ALBUTerol/ipratropium for Nebulization 3 milliLiter(s) Nebulizer every 12 hours  apixaban 5 milliGRAM(s) Oral every 12 hours  artificial  tears Solution 1 Drop(s) Both EYES three times a day  buDESOnide   0.5 milliGRAM(s) Respule 0.5 milliGRAM(s) Inhalation two times a day  cefepime   IVPB 1000 milliGRAM(s) IV Intermittent every 12 hours  clonazePAM  Tablet 1.5 milliGRAM(s) Oral at bedtime  collagenase Ointment 1 Application(s) Topical daily  dextrose 5%. 1000 milliLiter(s) (50 mL/Hr) IV Continuous <Continuous>  dextrose 50% Injectable 12.5 Gram(s) IV Push once  dextrose 50% Injectable 25 Gram(s) IV Push once  dextrose 50% Injectable 25 Gram(s) IV Push once  diphenoxylate/atropine 1 Tablet(s) Oral daily  famotidine   Suspension 20 milliGRAM(s) Oral daily  ferrous    sulfate Liquid 300 milliGRAM(s) Enteral Tube daily  gabapentin   Solution 300 milliGRAM(s) Oral two times a day  insulin lispro (HumaLOG) corrective regimen sliding scale   SubCutaneous every 6 hours  multivitamin/minerals 1 Tablet(s) Oral daily  predniSONE  Solution 7.5 milliGRAM(s) Oral daily  QUEtiapine 25 milliGRAM(s) Oral at bedtime  tiZANidine 4 milliGRAM(s) Oral <User Schedule>  vancomycin  IVPB 750 milliGRAM(s) IV Intermittent every 12 hours      >LABORATORY:                          10.7   16.0  )-----------( 324      ( 13 Jun 2019 14:30 )             32.3       06-13    130<L>  |  89<L>  |  27<H>  ----------------------------<  195<H>  4.9   |  27  |  0.36<L>    Ca    9.3      13 Jun 2019 14:30    TPro  6.4  /  Alb  3.4  /  TBili  0.4  /  DBili  x   /  AST  16  /  ALT  9<L>  /  AlkPhos  94  06-13          >MICROBIOLOGY:     Urine Culture:   Blood Culture:    >RADIOLOGY:    CXR:     >ASSESSMENT/PLAN:   HPI:  70 yo female with multiple prolonged hospitalization, PMH of Advanced dementia (nonverbal, dysphagia, with PEG tube, functional quadriplegic, chronic gavin catheter) sacral state 4 pressure ulcer, heel pressure ulcers, asthma on chronic prednsione, HLD, CVA, severe lordosis/kyphoscoliosis, 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  During last admission, patient was in septic shock, was transferred to MICU, was treated with IV vancomycin and cefepime and flagyl for MRSA infection and possible aspiration pneumonia.  Patient was discharged with prednisone taper.  Since discharge, patient has been in her usual state of health.  Over the last few days, family noticed that she is more lethargic, less communicative.  At baseline, she makes eye contact, smiles, occasionally follows some commands, but she is nonverbal.  Family also noticed fever for last few days, Tmax being 102.  Her urine in the gavin bag looked dark in color and less in amount.  At that point, family thought that she had UTI.  They spoke to PCP who recommended coming to ED.  No report of vomiting, cough, diarrhea. (13 Jun 2019 22:06)      1) Neutropenic Fever - Recurrent   - Tylenol / Cooling measures prn for Pyrexia  - BC x2, UA/UC  - CXR  - c/w Current Abx/ Antifungal/ IVF regimen   -c/w Monitoring closely   -ID on board, f/u with team  - F/U Primary  care team in AM MEDICINE NP  Patient is a 69y old  Female who presents with a chief complaint of fever and lethargy (13 Jun 2019 22:06)      Event Summary:   Notified by RN patient with fever, Temp- 103.4, and BP 98/52.  Patient seen and examined patient at bedside w/  present.   Patient lethargic, non-responsive and non-verbal at baseline, unable to contribute.   reports no changes from baseline.      requesting Left knee xray for h/o fracture w/ brace x6-8 weeks.    BP checked manually 104/50    >VITAL SIGNS:  T(C): 39.5 (06-14-19 @ 06:20), Max: 39.5 (06-14-19 @ 06:20)  T(F): 103.1 (06-14-19 @ 06:20), Max: 103.1 (06-14-19 @ 06:20)  HR: 106 (06-14-19 @ 06:20) (82 - 106)  BP: 104/50 (06-14-19 @ 06:50) (98/52 - 148/75)  RR: 18 (06-14-19 @ 06:20) (16 - 18)  SpO2: 95% (06-14-19 @ 06:20) (95% - 100%)      >Review Of Systems:   CONSTITUTIONAL:  +Fever.        >Radiology:  from: Xray Chest 1 View AP/PA (06.13.19 @ 14:34) >   No consolidation or pleural effusion is seen.  Impression: Bilateral linear atelectasis.      >PHYSICAL EXAM:  Constitutional:  Patient lethargic, non-responsive and non-verbal at baseline, unable to contribute.   reports no changes from baseline.     Cardiovascular: +S1 S2. RRR.  +Tachycardia. No murmurs.     Respiratory:  Even, unlabored.  Clear lungs bilaterally anteriorly. No wheezing, rhonchi, or crackles.  Gastrointestinal: +BS X4 active. Soft. Nontender. Nondistended.  +PEG  : +Gavin with clear yellow urine  Musculoskeletal: functional quadriplegic, contractures x4 of extremities. +LLE w/ brace.   +2 pulses bilaterally.   Skin:  +Feverish to touch.          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  During last admission, patient was in septic shock, was transferred to MICU, was treated with IV vancomycin and cefepime and flagyl for MRSA infection and possible aspiration pneumonia.  Patient was discharged with prednisone taper.  Since discharge, patient has been in her usual state of health.  Over the last few days, family noticed that she is more lethargic, less communicative.  At baseline, she makes eye contact, smiles, occasionally follows some commands, but she is nonverbal.  Family also noticed fever for last few days, Tmax being 102.  Her urine in the gavin bag looked dark in color and less in amount.  At that point, family thought that she had UTI.  They spoke to PCP who recommended coming to ED.  No report of vomiting, cough, diarrhea. (13 Jun 2019 22:06).  Admitted with Severe Sepsis / UTI / AMS on Vancomycin and Maxipime.  Now with fever.        1)  Fever - Likely as above   - Tylenol / Cooling measures prn for Pyrexia  -F/u STAT Lactate, CBC.    - f/u BC x2, UCX 6/13  - c/w Current Abx Vancomycin   -c/w Monitoring closely   -f/u ID Dr. Brand in AM  -ICU consult if decompensates   - D/w Attending, Dr. Bolden, d/c Maxipime, f/u with ID, gentle IVF pending lactate results  -Endorsed to day NP Provider      Sonia Guthrie, JONAH-BC  Medicine Department  #[id number]

## 2019-06-14 NOTE — DIETITIAN INITIAL EVALUATION ADULT. - NS AS NUTRI INTERV VITAMIN
Continue Multivitamin/mineral, add vitamin C, and consider Zinc x 10 days to optimize nutrient intake and help with pressure ulcer healing. Discussed EN, supplements, and vitamins with NP.

## 2019-06-14 NOTE — DIETITIAN INITIAL EVALUATION ADULT. - PT NOT SOURCE
Pt unable to speak/lethargic with history of dementia as per documentation -  at bedside provided information.

## 2019-06-14 NOTE — DIETITIAN INITIAL EVALUATION ADULT. - ENERGY NEEDS
Ht: 57 inches stated per  Wt: 100 pounds BMI: 21.6 kg/m2 IBW: 85 (+/-10%) 117.6 %IBW  Pertinent information: Pt 68 y/o F with PMH: multiple hospitalizations, dementia (non-verbal), dysphagia with PEG, functional quadriplegia, Blackman cath, chronic pressure ulcers in sacral and heels, asthma on Prednisone, HLD, CVA, severe lordosis/kyphoscoliosis, chronic MRSA of right hip prothesis, left knee fracture, DM, anxiety, admitted with fever and lethargy, found with UTI and severe sepsis.   Noted +2 left leg edema as per flow sheets. Skin: multiple pressure ulcers in sacrum stage 4, upper and lower lumbar spine, left malleolus, and right medial left leg unstageable, right malleolus stage 1, left heel stage 2, left foot in great toe stage 1, and sacrum stage 4 as per documentation.

## 2019-06-14 NOTE — DISCHARGE NOTE NURSING/CASE MANAGEMENT/SOCIAL WORK - NSDCPEEMAIL_GEN_ALL_CORE
[facility] for Tobacco Control email [email or web address]

## 2019-06-14 NOTE — CONSULT NOTE ADULT - SUBJECTIVE AND OBJECTIVE BOX
Wound SURGERY CONSULT NOTE    HPI:  68 yo female with multiple prolonged hospitalizations well known the wound care team. 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  During last admission, patient was in septic shock, was transferred to MICU, was treated with IV vancomycin and cefepime and flagyl for MRSA infection and possible aspiration pneumonia.  Patient was discharged with prednisone taper.  Since discharge, patient has been in her usual state of health.  Over the last few days, family noticed that she is more lethargic, less communicative.  At baseline, she makes eye contact, smiles, occasionally follows some commands, but she is nonverbal.  Family also noticed fever for last few days, Tmax being 102.  Her urine in the gavin bag looked dark in color and less in amount.  At that point, family thought that she had UTI.  They spoke to PCP who recommended coming to ED.  No report of vomiting, cough, diarrhea.       PAST MEDICAL & SURGICAL HISTORY:  CVA (cerebral vascular accident)  Fatty pancreas  PNA (pneumonia)  Pulmonary HTN  IGT (impaired glucose tolerance)  Ulcerative colitis  Acid reflux  Anxiety  Depression  Mouth sores  HLD (hyperlipidemia)  Asthma  Humeral head fracture  H/O: hysterectomy  H/O cataract extraction, left  History of knee replacement    REVIEW OF SYSTEMS  Patient is non-verbal. The ROS was provided by spouse who was at bedside during this visit.    General: chronically ill with multiple prolonged hospitalization, fever this am  Respiratory and Thorax: asthma on chronic prednisone  Gastrointestinal:	Enteral feeds via PEG tube	  Genitourinary: multiple UTIs  Musculoskeletal:	 severe contractions in bilateral upper and lower extremities  Neurological: baseline non-verbal, opens eyes	  Endocrine: diabetes, glycemic control with oral meds at home, s/s insulin as inpatient  Skin: multiple pressure injuries, chronic    MEDICATIONS  (STANDING):  ALBUTerol/ipratropium for Nebulization 3 milliLiter(s) Nebulizer every 12 hours  apixaban 5 milliGRAM(s) Oral every 12 hours  artificial  tears Solution 1 Drop(s) Both EYES three times a day  buDESOnide   0.5 milliGRAM(s) Respule 0.5 milliGRAM(s) Inhalation two times a day  cefepime   IVPB      cefepime   IVPB 1000 milliGRAM(s) IV Intermittent every 8 hours  chlorhexidine 2% Cloths 1 Application(s) Topical daily  clonazePAM  Tablet 1.5 milliGRAM(s) Oral at bedtime  collagenase Ointment 1 Application(s) Topical daily  collagenase Ointment 1 Application(s) Topical daily  dextrose 5%. 1000 milliLiter(s) (50 mL/Hr) IV Continuous <Continuous>  dextrose 50% Injectable 12.5 Gram(s) IV Push once  dextrose 50% Injectable 25 Gram(s) IV Push once  dextrose 50% Injectable 25 Gram(s) IV Push once  diphenoxylate/atropine 1 Tablet(s) Oral daily  famotidine   Suspension 20 milliGRAM(s) Oral daily  ferrous    sulfate Liquid 300 milliGRAM(s) Enteral Tube daily  gabapentin   Solution 300 milliGRAM(s) Oral two times a day  insulin lispro (HumaLOG) corrective regimen sliding scale   SubCutaneous every 6 hours  multivitamin/minerals 1 Tablet(s) Oral daily  predniSONE  Solution 7.5 milliGRAM(s) Oral daily  QUEtiapine 25 milliGRAM(s) Oral at bedtime  tiZANidine 4 milliGRAM(s) Oral <User Schedule>  vancomycin  IVPB 750 milliGRAM(s) IV Intermittent every 12 hours    MEDICATIONS  (PRN):  dextrose 40% Gel 15 Gram(s) Oral once PRN Blood Glucose LESS THAN 70 milliGRAM(s)/deciliter  diphenhydrAMINE   Injectable 25 milliGRAM(s) IntraMuscular every 12 hours PRN Rash and/or Itching  glucagon  Injectable 1 milliGRAM(s) IntraMuscular once PRN Glucose LESS THAN 70 milligrams/deciliter      Allergies    ASA; dye contrast (Anaphylaxis)  aspirin (Short breath)  divalproex sodium (Other (Unknown))  Flowers and Plants (Short breath)  Haldol (Other (Unknown))  penicillin (Short breath; Rash)  sulfa drugs (Short breath; Rash)  vancomycin (Rash; Urticaria; Hives)  Xanax (Other (Unknown))    SOCIAL HISTORY:  ; (+)HHA/ lives at home    FAMILY HISTORY:  Family history of dementia (Grandparent)  Family history of colon cancer (Grandparent)    Vital Signs Last 24 Hrs  T(C): 37.1 (2019 14:04), Max: 39.8 (2019 08:05)  T(F): 98.8 (2019 14:04), Max: 103.6 (2019 08:05)  HR: 106 (2019 06:20) (82 - 106)  BP: 104/50 (2019 06:50) (98/52 - 148/75)  BP(mean): --  RR: 18 (2019 06:20) (16 - 18)  SpO2: 95% (2019 06:20) (95% - 100%)    Physical Exam:  General: cachectic, frail, chronically ill appearing  Respiratory: no cough or SOB, O2 NC in place  Gastrointestinal soft NT/ND (+) PEG  Neurology: not following commands  Musculoskeletal: severe upper and lower extremity contractions, Left leg with brace in place  Vascular: + BLE DP/PT pulses palpable, BLE equally warm, no acute ischemia noted  Skin:  blotchy faint erythematous rash on trunk,   Thoracic spine wound 100% covered with soft, moist, yellow slough, L 4.5cm x W 2.5cm x D 0.3cm moderate serosanguinous drainage, no induration, fluctuance, periwound with blotchy erythema as described,   Sacral wound to through muscle with no palpable bone, small area of white slough on right aspect of wound bed which is otherwise red with some granulation tissue, L 10cmc x W 10.5cm x D 3cm , < 2cm undermining on Left side of wound edge which is open. There is no induration, fluctuance, moderate serosanguinous drainage, periwound as described above.    Left ankle lateral malleolus no necrotic tissue, white fibrinous tissue/pink wound base, open edges, small amount of serosangiunous drainage, L 0.6cm x L 0.8cm x D negligible, no induration, fluctuance or erythema.   Right medial lower leg with purple/maroon discoloration and small area of superficial epithelial loss L 5.7cm x W 0.7cm x D negligible in open area and unknown in discolored area, scant serosangiunous drainage, no erythema, induration, fluctuance or odor.   L ischium purple/maroon discoloration with intact skin L 2.5cm x W 1.5cm x D none. no erythema, induration, fluctuance or drainage.    LABS:      131<L>  |  93<L>  |  23  ----------------------------<  123<H>  4.2   |  23  |  0.35<L>    Ca    9.0      2019 07:14  Phos  2.5       Mg     1.7         TPro  5.8<L>  /  Alb  3.0<L>  /  TBili  0.6  /  DBili  x   /  AST  12  /  ALT  8<L>  /  AlkPhos  81                            10.0   19.37 )-----------( 377      ( 2019 09:27 )             32.2     PT/INR - ( 2019 14:30 )   PT: 16.9 sec;   INR: 1.47 ratio         PTT - ( 2019 14:30 )  PTT:37.4 sec  Urinalysis Basic - ( 2019 15:54 )    Color: Yellow / Appearance: Slightly Turbid / S.015 / pH: x  Gluc: x / Ketone: Negative  / Bili: Negative / Urobili: Negative   Blood: x / Protein: Trace / Nitrite: Negative   Leuk Esterase: Large / RBC: 5 /hpf /  /HPF   Sq Epi: x / Non Sq Epi: 1 /hpf / Bacteria: Moderate    2019 Blood cultures: neg, no growth x 2
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  During last admission, patient was in septic shock, was transferred to MICU, was treated with IV vancomycin and cefepime and flagyl for MRSA infection and possible aspiration pneumonia.  Patient was discharged with prednisone taper.  Since discharge, patient has been in her usual state of health.  Over the last few days, family noticed that she is more lethargic, less communicative.  At baseline, she makes eye contact, smiles, occasionally follows some commands, but she is nonverbal.  Family also noticed fever for last few days, Tmax being 102.  Her urine in the gavin bag looked dark in color and less in amount.  At that point, family thought that she had UTI.  They spoke to PCP who recommended coming to ED.  No report of vomiting, cough, diarrhea. (13 Jun 2019 22:06)      PAST MEDICAL & SURGICAL HISTORY:  CVA (cerebral vascular accident)  Fatty pancreas  PNA (pneumonia)  Pulmonary HTN  IGT (impaired glucose tolerance)  Ulcerative colitis  Acid reflux  Anxiety  Depression  Mouth sores  HLD (hyperlipidemia)  Asthma  Humeral head fracture  H/O: hysterectomy  H/O cataract extraction, left  History of knee replacement      Social history:    FAMILY HISTORY:  Family history of dementia (Grandparent)  Family history of colon cancer (Grandparent)              Allergic/Immunologic:	No hives or rash   Allergies    ASA; dye contrast (Anaphylaxis)  aspirin (Short breath)  divalproex sodium (Other (Unknown))  Flowers and Plants (Short breath)  Haldol (Other (Unknown))  penicillin (Short breath; Rash)  sulfa drugs (Short breath; Rash)  vancomycin (Rash; Urticaria; Hives)  Xanax (Other (Unknown))    Intolerances        Antimicrobials:    vancomycin  IVPB 750 milliGRAM(s) IV Intermittent every 12 hours        Vital Signs Last 24 Hrs  T(C): 39.8 (14 Jun 2019 08:05), Max: 39.8 (14 Jun 2019 08:05)  T(F): 103.6 (14 Jun 2019 08:05), Max: 103.6 (14 Jun 2019 08:05)  HR: 106 (14 Jun 2019 06:20) (82 - 106)  BP: 104/50 (14 Jun 2019 06:50) (98/52 - 148/75)  BP(mean): --  RR: 18 (14 Jun 2019 06:20) (16 - 18)  SpO2: 95% (14 Jun 2019 06:20) (95% - 100%)               cachexia     Eyes:PERRL EOMI.NO discharge or conjunctival injection    ENMT:No sinus tenderness.No thrush.No pharyngeal exudate or erythema.Fair dental hygiene    Neck:Supple,No LN,no JVD      Respiratory:Good air entry bilaterally,CTA         Gastrointestinal:Soft BS(+) no tenderness no masses ,No rebound or guarding    Genitourinary:No CVA tendereness     Rectal:    Extremities contracted  redness on right leg     Vascular:peripheral pulses felt                                                       10.7   16.0  )-----------( 324      ( 13 Jun 2019 14:30 )             32.3         06-14    131<L>  |  93<L>  |  23  ----------------------------<  123<H>  4.2   |  23  |  0.35<L>    Ca    9.0      14 Jun 2019 07:14  Phos  2.5     06-14  Mg     1.7     06-14    TPro  5.8<L>  /  Alb  3.0<L>  /  TBili  0.6  /  DBili  x   /  AST  12  /  ALT  8<L>  /  AlkPhos  81  06-14      RECENT CULTURES:      MICROBIOLOGY:          Radiology:      Assessment:        Recommendations and Plan:    Pager [phone]  After 5 pm/weekends or if no response :[phone]

## 2019-06-14 NOTE — DIETITIAN INITIAL EVALUATION ADULT. - OTHER INFO
Nutrition consult received for BMI <18, pressure ulcer >2, and EN PTA.  reports pt tolerating tube feedings at 240 ml every 4 hours, denies pt with nausea, vomiting, diarrhea, or constipation, last BM yesterday (06/13) -  states pt has no problems with her BM. Offered nutritional supplementation to help with pressure ulcer healing -  refused Iron, states pt did not tolerate it in the past, thus requests no carb Prosource, spoke to NP. Denies pt with weight changes PTA, states weight has been stable between 85 - 90 pounds in the last 4 - 5 months due to pt receiving tube feedings; states watching closely pt's weight. Weight as per previous RD notes (02/27/2019) 78 pounds -> (03/26/2019) 89.8 pounds. Weight as per flow sheets (06/13) 100 pounds -?accuracy due to fluid shifts, will continue to monitor.

## 2019-06-14 NOTE — DISCHARGE NOTE NURSING/CASE MANAGEMENT/SOCIAL WORK - NSDCDPATPORTLINK_GEN_ALL_CORE
You can access the [facility] Patient Portal, offered by [facility], by registering with the following website: [email or web address]

## 2019-06-14 NOTE — CONSULT NOTE ADULT - ASSESSMENT
Impression:    Sacral Stage 4 pressure injury  Thoracic spine unstageable pressure injury  L ishcium deep tissue injury  L lateral malleolus unstageable pressure injury  Right medial lower leg deep tissue injury    Recommend:  1.) Topical therapy:  a.) Sacrum - irrigate with NS, pat dry, apply collagenase, cover/pack with lightly NS moistened gauze, cover with DSD, secure with tegederm daily  b.) Thoracic spine - cleanse with NS, pat dry, apply collagenase, cover with lightly NS moistened gauze, cover with DSD, secure with tegederm daily  c.) L ishcium deep tissue injury - gentle skin care, apply allevyn Life silicone border dressing q3 days  d.) L lateral malleolus - cleanse with NS, pat dry, apply allevyn Life silicone border dressing q3 days  e.) R medial lower leg - cleanse with NS, pat dry, apply allevyn Life silicone border dressing q3 days  2.) Emollient therapy: Moisturize intact skin w/ SWEEN cream daily  3.) Continue alternating air with low air loss surface  4.) Turn and reposition q2 hours  5.) Nutrition input noted  6.) Incontinence management - pericare, incontinence cleanse, underpads  7.) Care as per medicine will follow w/ you    Upon discharge f/[facility] as outpatient at [facility] [address] [place] [address] [phone]  Seen with Dr. Curiel and discussed with team  Will follow.  Thank you for this consult  Wendy Hammonds, NP-C, CWOCN [id number]

## 2019-06-14 NOTE — DISCHARGE NOTE NURSING/CASE MANAGEMENT/SOCIAL WORK - NSDCPEWEB_GEN_ALL_CORE
[facility] for Tobacco Control website --- [email or web address] website --- [email or web address]

## 2019-06-14 NOTE — DIETITIAN INITIAL EVALUATION ADULT. - ADHERENCE
reports giving pt Glucerna 1.2 at 240 ml 4 x day every 4 hours (8 am, 12 pm, 4:30 pm, and 8:30 pm) provides 960 ml, 1152 kcal, 58 g protein, and 773 ml water (25 kcal/kg and 1.3 g/kg protein based on dosing weight 45.4 kg); states adding 40 ml of water before and after each feeding (total 1093 ml water/day).  reports giving pt liquid Prostat 2xday, multivitamin, and vitamin C for pressure ulcers; states pt also taking Ferrous Sulfate. Denies monitoring pt's BG as per Dr's instruction and states pt taking Metformin and Prednisone PTA; last HbA1c as per chart (02/27/2019) 5.3% - indicates good BG control.

## 2019-06-14 NOTE — PROVIDER CONTACT NOTE (CRITICAL VALUE NOTIFICATION) - ASSESSMENT
Patient is alert, non-verbal. No acute distress noted. VS stable, afebrile at this time. Blackman intact. PEG intact.  is at the bedside. Contact precautions maintained for MRSA.

## 2019-06-14 NOTE — DIETITIAN INITIAL EVALUATION ADULT. - PHYSICAL APPEARANCE
Nutrition Focused Physical Exam deferred at this time due to pt sleeping; unable to visually assess as pt covered in blankets.

## 2019-06-14 NOTE — PROVIDER CONTACT NOTE (CRITICAL VALUE NOTIFICATION) - BACKGROUND
Patient is a 70YO female admitted with UTI. PMH includes CVA, PNA, Asthma, HLD, Ulcerative colitis, fatty pancreas. Patient is on contact precautions for MRSA in right hip.

## 2019-06-15 LAB
-  AMIKACIN: SIGNIFICANT CHANGE UP
-  AMIKACIN: SIGNIFICANT CHANGE UP
-  AMPICILLIN/SULBACTAM: SIGNIFICANT CHANGE UP
-  AMPICILLIN: SIGNIFICANT CHANGE UP
-  AZTREONAM: SIGNIFICANT CHANGE UP
-  AZTREONAM: SIGNIFICANT CHANGE UP
-  CEFAZOLIN: SIGNIFICANT CHANGE UP
-  CEFEPIME: SIGNIFICANT CHANGE UP
-  CEFEPIME: SIGNIFICANT CHANGE UP
-  CEFOXITIN: SIGNIFICANT CHANGE UP
-  CEFTAZIDIME: SIGNIFICANT CHANGE UP
-  CEFTRIAXONE: SIGNIFICANT CHANGE UP
-  CIPROFLOXACIN: SIGNIFICANT CHANGE UP
-  CIPROFLOXACIN: SIGNIFICANT CHANGE UP
-  COAGULASE NEGATIVE STAPHYLOCOCCUS: SIGNIFICANT CHANGE UP
-  ERTAPENEM: SIGNIFICANT CHANGE UP
-  GENTAMICIN: SIGNIFICANT CHANGE UP
-  GENTAMICIN: SIGNIFICANT CHANGE UP
-  IMIPENEM: SIGNIFICANT CHANGE UP
-  IMIPENEM: SIGNIFICANT CHANGE UP
-  LEVOFLOXACIN: SIGNIFICANT CHANGE UP
-  LEVOFLOXACIN: SIGNIFICANT CHANGE UP
-  MEROPENEM: SIGNIFICANT CHANGE UP
-  MEROPENEM: SIGNIFICANT CHANGE UP
-  NITROFURANTOIN: SIGNIFICANT CHANGE UP
-  PIPERACILLIN/TAZOBACTAM: SIGNIFICANT CHANGE UP
-  PIPERACILLIN/TAZOBACTAM: SIGNIFICANT CHANGE UP
-  TIGECYCLINE: SIGNIFICANT CHANGE UP
-  TOBRAMYCIN: SIGNIFICANT CHANGE UP
-  TOBRAMYCIN: SIGNIFICANT CHANGE UP
-  TRIMETHOPRIM/SULFAMETHOXAZOLE: SIGNIFICANT CHANGE UP
ANION GAP SERPL CALC-SCNC: 13 MMOL/L — SIGNIFICANT CHANGE UP (ref 5–17)
BASOPHILS # BLD AUTO: 0.03 K/UL — SIGNIFICANT CHANGE UP (ref 0–0.2)
BASOPHILS NFR BLD AUTO: 0.2 % — SIGNIFICANT CHANGE UP (ref 0–2)
BUN SERPL-MCNC: 39 MG/DL — HIGH (ref 7–23)
CALCIUM SERPL-MCNC: 9 MG/DL — SIGNIFICANT CHANGE UP (ref 8.4–10.5)
CHLORIDE SERPL-SCNC: 89 MMOL/L — LOW (ref 96–108)
CO2 SERPL-SCNC: 23 MMOL/L — SIGNIFICANT CHANGE UP (ref 22–31)
CREAT SERPL-MCNC: 0.75 MG/DL — SIGNIFICANT CHANGE UP (ref 0.5–1.3)
CULTURE RESULTS: SIGNIFICANT CHANGE UP
EOSINOPHIL # BLD AUTO: 1.17 K/UL — HIGH (ref 0–0.5)
EOSINOPHIL NFR BLD AUTO: 7.1 % — HIGH (ref 0–6)
GLUCOSE BLDC GLUCOMTR-MCNC: 133 MG/DL — HIGH (ref 70–99)
GLUCOSE BLDC GLUCOMTR-MCNC: 163 MG/DL — HIGH (ref 70–99)
GLUCOSE BLDC GLUCOMTR-MCNC: 192 MG/DL — HIGH (ref 70–99)
GLUCOSE BLDC GLUCOMTR-MCNC: 255 MG/DL — HIGH (ref 70–99)
GLUCOSE SERPL-MCNC: 184 MG/DL — HIGH (ref 70–99)
GRAM STN FLD: SIGNIFICANT CHANGE UP
GRAM STN FLD: SIGNIFICANT CHANGE UP
HCT VFR BLD CALC: 33.4 % — LOW (ref 34.5–45)
HGB BLD-MCNC: 10.2 G/DL — LOW (ref 11.5–15.5)
IMM GRANULOCYTES NFR BLD AUTO: 0.5 % — SIGNIFICANT CHANGE UP (ref 0–1.5)
LYMPHOCYTES # BLD AUTO: 0.31 K/UL — LOW (ref 1–3.3)
LYMPHOCYTES # BLD AUTO: 1.9 % — LOW (ref 13–44)
MCHC RBC-ENTMCNC: 29 PG — SIGNIFICANT CHANGE UP (ref 27–34)
MCHC RBC-ENTMCNC: 30.5 GM/DL — LOW (ref 32–36)
MCV RBC AUTO: 94.9 FL — SIGNIFICANT CHANGE UP (ref 80–100)
METHOD TYPE: SIGNIFICANT CHANGE UP
MONOCYTES # BLD AUTO: 0.72 K/UL — SIGNIFICANT CHANGE UP (ref 0–0.9)
MONOCYTES NFR BLD AUTO: 4.4 % — SIGNIFICANT CHANGE UP (ref 2–14)
NEUTROPHILS # BLD AUTO: 14.2 K/UL — HIGH (ref 1.8–7.4)
NEUTROPHILS NFR BLD AUTO: 85.9 % — HIGH (ref 43–77)
ORGANISM # SPEC MICROSCOPIC CNT: SIGNIFICANT CHANGE UP
PLATELET # BLD AUTO: 332 K/UL — SIGNIFICANT CHANGE UP (ref 150–400)
POTASSIUM SERPL-MCNC: 4.4 MMOL/L — SIGNIFICANT CHANGE UP (ref 3.5–5.3)
POTASSIUM SERPL-SCNC: 4.4 MMOL/L — SIGNIFICANT CHANGE UP (ref 3.5–5.3)
RBC # BLD: 3.52 M/UL — LOW (ref 3.8–5.2)
RBC # FLD: 16.8 % — HIGH (ref 10.3–14.5)
SODIUM SERPL-SCNC: 125 MMOL/L — LOW (ref 135–145)
SPECIMEN SOURCE: SIGNIFICANT CHANGE UP
VANCOMYCIN TROUGH SERPL-MCNC: 23.1 UG/ML — HIGH (ref 10–20)
WBC # BLD: 16.52 K/UL — HIGH (ref 3.8–10.5)
WBC # FLD AUTO: 16.52 K/UL — HIGH (ref 3.8–10.5)

## 2019-06-15 RX ORDER — SODIUM CHLORIDE 9 MG/ML
1000 INJECTION INTRAMUSCULAR; INTRAVENOUS; SUBCUTANEOUS
Refills: 0 | Status: DISCONTINUED | OUTPATIENT
Start: 2019-06-15 | End: 2019-06-19

## 2019-06-15 RX ORDER — ACETAMINOPHEN 500 MG
650 TABLET ORAL EVERY 6 HOURS
Refills: 0 | Status: DISCONTINUED | OUTPATIENT
Start: 2019-06-15 | End: 2019-06-19

## 2019-06-15 RX ADMIN — Medication 1 APPLICATION(S): at 13:22

## 2019-06-15 RX ADMIN — QUETIAPINE FUMARATE 25 MILLIGRAM(S): 200 TABLET, FILM COATED ORAL at 22:58

## 2019-06-15 RX ADMIN — GABAPENTIN 300 MILLIGRAM(S): 400 CAPSULE ORAL at 22:46

## 2019-06-15 RX ADMIN — CEFEPIME 100 MILLIGRAM(S): 1 INJECTION, POWDER, FOR SOLUTION INTRAMUSCULAR; INTRAVENOUS at 17:34

## 2019-06-15 RX ADMIN — APIXABAN 5 MILLIGRAM(S): 2.5 TABLET, FILM COATED ORAL at 09:23

## 2019-06-15 RX ADMIN — Medication 25 MILLIGRAM(S): at 06:14

## 2019-06-15 RX ADMIN — TIZANIDINE 4 MILLIGRAM(S): 4 TABLET ORAL at 09:23

## 2019-06-15 RX ADMIN — Medication 650 MILLIGRAM(S): at 07:24

## 2019-06-15 RX ADMIN — Medication 62.5 MILLIGRAM(S): at 17:33

## 2019-06-15 RX ADMIN — CEFEPIME 100 MILLIGRAM(S): 1 INJECTION, POWDER, FOR SOLUTION INTRAMUSCULAR; INTRAVENOUS at 09:23

## 2019-06-15 RX ADMIN — Medication 3 MILLILITER(S): at 05:31

## 2019-06-15 RX ADMIN — APIXABAN 5 MILLIGRAM(S): 2.5 TABLET, FILM COATED ORAL at 21:30

## 2019-06-15 RX ADMIN — ZINC SULFATE TAB 220 MG (50 MG ZINC EQUIVALENT) 220 MILLIGRAM(S): 220 (50 ZN) TAB at 13:18

## 2019-06-15 RX ADMIN — Medication 1 TABLET(S): at 13:18

## 2019-06-15 RX ADMIN — Medication 1: at 06:14

## 2019-06-15 RX ADMIN — Medication 1.5 MILLIGRAM(S): at 22:58

## 2019-06-15 RX ADMIN — Medication 3: at 13:16

## 2019-06-15 RX ADMIN — Medication 7.5 MILLIGRAM(S): at 05:30

## 2019-06-15 RX ADMIN — Medication 3 MILLILITER(S): at 17:33

## 2019-06-15 RX ADMIN — Medication 62.5 MILLIGRAM(S): at 06:52

## 2019-06-15 RX ADMIN — SODIUM CHLORIDE 50 MILLILITER(S): 9 INJECTION INTRAMUSCULAR; INTRAVENOUS; SUBCUTANEOUS at 10:52

## 2019-06-15 RX ADMIN — NYSTATIN CREAM 1 APPLICATION(S): 100000 CREAM TOPICAL at 13:24

## 2019-06-15 RX ADMIN — FAMOTIDINE 20 MILLIGRAM(S): 10 INJECTION INTRAVENOUS at 13:38

## 2019-06-15 RX ADMIN — Medication 25 MILLIGRAM(S): at 17:34

## 2019-06-15 RX ADMIN — Medication 650 MILLIGRAM(S): at 02:09

## 2019-06-15 RX ADMIN — Medication 1 DROP(S): at 13:24

## 2019-06-15 RX ADMIN — TIZANIDINE 4 MILLIGRAM(S): 4 TABLET ORAL at 21:30

## 2019-06-15 RX ADMIN — NYSTATIN CREAM 1 APPLICATION(S): 100000 CREAM TOPICAL at 05:30

## 2019-06-15 RX ADMIN — Medication 1 DROP(S): at 21:32

## 2019-06-15 RX ADMIN — Medication 0.5 MILLIGRAM(S): at 05:31

## 2019-06-15 RX ADMIN — Medication 0.5 MILLIGRAM(S): at 17:34

## 2019-06-15 RX ADMIN — Medication 1 TABLET(S): at 13:38

## 2019-06-15 RX ADMIN — CHLORHEXIDINE GLUCONATE 1 APPLICATION(S): 213 SOLUTION TOPICAL at 17:06

## 2019-06-15 RX ADMIN — NYSTATIN CREAM 1 APPLICATION(S): 100000 CREAM TOPICAL at 22:58

## 2019-06-15 RX ADMIN — GABAPENTIN 300 MILLIGRAM(S): 400 CAPSULE ORAL at 09:23

## 2019-06-15 RX ADMIN — Medication 1 DROP(S): at 05:30

## 2019-06-15 RX ADMIN — Medication 500 MILLIGRAM(S): at 13:18

## 2019-06-15 RX ADMIN — CEFEPIME 100 MILLIGRAM(S): 1 INJECTION, POWDER, FOR SOLUTION INTRAMUSCULAR; INTRAVENOUS at 00:57

## 2019-06-15 RX ADMIN — Medication 1: at 00:58

## 2019-06-15 RX ADMIN — Medication 300 MILLIGRAM(S): at 13:18

## 2019-06-16 LAB
ANION GAP SERPL CALC-SCNC: 10 MMOL/L — SIGNIFICANT CHANGE UP (ref 5–17)
BUN SERPL-MCNC: 34 MG/DL — HIGH (ref 7–23)
CALCIUM SERPL-MCNC: 8.7 MG/DL — SIGNIFICANT CHANGE UP (ref 8.4–10.5)
CHLORIDE SERPL-SCNC: 93 MMOL/L — LOW (ref 96–108)
CO2 SERPL-SCNC: 22 MMOL/L — SIGNIFICANT CHANGE UP (ref 22–31)
CREAT SERPL-MCNC: 0.46 MG/DL — LOW (ref 0.5–1.3)
CULTURE RESULTS: SIGNIFICANT CHANGE UP
GLUCOSE BLDC GLUCOMTR-MCNC: 132 MG/DL — HIGH (ref 70–99)
GLUCOSE BLDC GLUCOMTR-MCNC: 138 MG/DL — HIGH (ref 70–99)
GLUCOSE BLDC GLUCOMTR-MCNC: 140 MG/DL — HIGH (ref 70–99)
GLUCOSE BLDC GLUCOMTR-MCNC: 262 MG/DL — HIGH (ref 70–99)
GLUCOSE SERPL-MCNC: 132 MG/DL — HIGH (ref 70–99)
ORGANISM # SPEC MICROSCOPIC CNT: SIGNIFICANT CHANGE UP
ORGANISM # SPEC MICROSCOPIC CNT: SIGNIFICANT CHANGE UP
POTASSIUM SERPL-MCNC: 4.3 MMOL/L — SIGNIFICANT CHANGE UP (ref 3.5–5.3)
POTASSIUM SERPL-SCNC: 4.3 MMOL/L — SIGNIFICANT CHANGE UP (ref 3.5–5.3)
SODIUM SERPL-SCNC: 125 MMOL/L — LOW (ref 135–145)
SPECIMEN SOURCE: SIGNIFICANT CHANGE UP
VANCOMYCIN TROUGH SERPL-MCNC: 29.5 UG/ML — CRITICAL HIGH (ref 10–20)

## 2019-06-16 PROCEDURE — 99232 SBSQ HOSP IP/OBS MODERATE 35: CPT

## 2019-06-16 RX ADMIN — Medication 1 TABLET(S): at 12:59

## 2019-06-16 RX ADMIN — Medication 7.5 MILLIGRAM(S): at 05:47

## 2019-06-16 RX ADMIN — CEFEPIME 100 MILLIGRAM(S): 1 INJECTION, POWDER, FOR SOLUTION INTRAMUSCULAR; INTRAVENOUS at 16:59

## 2019-06-16 RX ADMIN — GABAPENTIN 300 MILLIGRAM(S): 400 CAPSULE ORAL at 09:04

## 2019-06-16 RX ADMIN — NYSTATIN CREAM 1 APPLICATION(S): 100000 CREAM TOPICAL at 05:47

## 2019-06-16 RX ADMIN — Medication 3 MILLILITER(S): at 16:59

## 2019-06-16 RX ADMIN — Medication 1 APPLICATION(S): at 13:00

## 2019-06-16 RX ADMIN — Medication 500 MILLIGRAM(S): at 12:59

## 2019-06-16 RX ADMIN — APIXABAN 5 MILLIGRAM(S): 2.5 TABLET, FILM COATED ORAL at 08:51

## 2019-06-16 RX ADMIN — NYSTATIN CREAM 1 APPLICATION(S): 100000 CREAM TOPICAL at 21:11

## 2019-06-16 RX ADMIN — QUETIAPINE FUMARATE 25 MILLIGRAM(S): 200 TABLET, FILM COATED ORAL at 21:10

## 2019-06-16 RX ADMIN — Medication 1 DROP(S): at 05:48

## 2019-06-16 RX ADMIN — TIZANIDINE 4 MILLIGRAM(S): 4 TABLET ORAL at 21:10

## 2019-06-16 RX ADMIN — CEFEPIME 100 MILLIGRAM(S): 1 INJECTION, POWDER, FOR SOLUTION INTRAMUSCULAR; INTRAVENOUS at 00:37

## 2019-06-16 RX ADMIN — TIZANIDINE 4 MILLIGRAM(S): 4 TABLET ORAL at 08:51

## 2019-06-16 RX ADMIN — Medication 1 DROP(S): at 21:11

## 2019-06-16 RX ADMIN — Medication 0.5 MILLIGRAM(S): at 16:59

## 2019-06-16 RX ADMIN — Medication 3 MILLILITER(S): at 05:47

## 2019-06-16 RX ADMIN — GABAPENTIN 300 MILLIGRAM(S): 400 CAPSULE ORAL at 16:59

## 2019-06-16 RX ADMIN — CHLORHEXIDINE GLUCONATE 1 APPLICATION(S): 213 SOLUTION TOPICAL at 08:46

## 2019-06-16 RX ADMIN — Medication 300 MILLIGRAM(S): at 12:59

## 2019-06-16 RX ADMIN — ZINC SULFATE TAB 220 MG (50 MG ZINC EQUIVALENT) 220 MILLIGRAM(S): 220 (50 ZN) TAB at 12:59

## 2019-06-16 RX ADMIN — Medication 0.5 MILLIGRAM(S): at 05:47

## 2019-06-16 RX ADMIN — Medication 1 DROP(S): at 13:00

## 2019-06-16 RX ADMIN — CEFEPIME 100 MILLIGRAM(S): 1 INJECTION, POWDER, FOR SOLUTION INTRAMUSCULAR; INTRAVENOUS at 08:47

## 2019-06-16 RX ADMIN — APIXABAN 5 MILLIGRAM(S): 2.5 TABLET, FILM COATED ORAL at 21:10

## 2019-06-16 RX ADMIN — NYSTATIN CREAM 1 APPLICATION(S): 100000 CREAM TOPICAL at 13:00

## 2019-06-16 RX ADMIN — Medication 1.5 MILLIGRAM(S): at 22:32

## 2019-06-16 NOTE — PROVIDER CONTACT NOTE (CRITICAL VALUE NOTIFICATION) - ACTION/TREATMENT ORDERED:
Cefepime , Vanco continues
Continue Cefepime + Vancomycin.
Holding this dose of Vanco.
Continue to monitor, Will review

## 2019-06-17 LAB
-  AMPICILLIN/SULBACTAM: SIGNIFICANT CHANGE UP
-  CEFAZOLIN: SIGNIFICANT CHANGE UP
-  CLINDAMYCIN: SIGNIFICANT CHANGE UP
-  ERYTHROMYCIN: SIGNIFICANT CHANGE UP
-  GENTAMICIN: SIGNIFICANT CHANGE UP
-  OXACILLIN: SIGNIFICANT CHANGE UP
-  PENICILLIN: SIGNIFICANT CHANGE UP
-  RIFAMPIN: SIGNIFICANT CHANGE UP
-  TETRACYCLINE: SIGNIFICANT CHANGE UP
-  TRIMETHOPRIM/SULFAMETHOXAZOLE: SIGNIFICANT CHANGE UP
-  VANCOMYCIN: SIGNIFICANT CHANGE UP
ANION GAP SERPL CALC-SCNC: 14 MMOL/L — SIGNIFICANT CHANGE UP (ref 5–17)
BUN SERPL-MCNC: 38 MG/DL — HIGH (ref 7–23)
CALCIUM SERPL-MCNC: 9.2 MG/DL — SIGNIFICANT CHANGE UP (ref 8.4–10.5)
CHLORIDE SERPL-SCNC: 97 MMOL/L — SIGNIFICANT CHANGE UP (ref 96–108)
CO2 SERPL-SCNC: 25 MMOL/L — SIGNIFICANT CHANGE UP (ref 22–31)
CREAT SERPL-MCNC: 0.37 MG/DL — LOW (ref 0.5–1.3)
CULTURE RESULTS: SIGNIFICANT CHANGE UP
CULTURE RESULTS: SIGNIFICANT CHANGE UP
GLUCOSE BLDC GLUCOMTR-MCNC: 115 MG/DL — HIGH (ref 70–99)
GLUCOSE BLDC GLUCOMTR-MCNC: 127 MG/DL — HIGH (ref 70–99)
GLUCOSE BLDC GLUCOMTR-MCNC: 129 MG/DL — HIGH (ref 70–99)
GLUCOSE BLDC GLUCOMTR-MCNC: 162 MG/DL — HIGH (ref 70–99)
GLUCOSE BLDC GLUCOMTR-MCNC: 252 MG/DL — HIGH (ref 70–99)
GLUCOSE SERPL-MCNC: 114 MG/DL — HIGH (ref 70–99)
HCT VFR BLD CALC: 25 % — LOW (ref 34.5–45)
HCT VFR BLD CALC: 25.8 % — LOW (ref 34.5–45)
HGB BLD-MCNC: 7.6 G/DL — LOW (ref 11.5–15.5)
HGB BLD-MCNC: 8.3 G/DL — LOW (ref 11.5–15.5)
MCHC RBC-ENTMCNC: 29.6 PG — SIGNIFICANT CHANGE UP (ref 27–34)
MCHC RBC-ENTMCNC: 29.7 PG — SIGNIFICANT CHANGE UP (ref 27–34)
MCHC RBC-ENTMCNC: 30.4 GM/DL — LOW (ref 32–36)
MCHC RBC-ENTMCNC: 32.1 GM/DL — SIGNIFICANT CHANGE UP (ref 32–36)
MCV RBC AUTO: 92.3 FL — SIGNIFICANT CHANGE UP (ref 80–100)
MCV RBC AUTO: 97.3 FL — SIGNIFICANT CHANGE UP (ref 80–100)
METHOD TYPE: SIGNIFICANT CHANGE UP
ORGANISM # SPEC MICROSCOPIC CNT: SIGNIFICANT CHANGE UP
ORGANISM # SPEC MICROSCOPIC CNT: SIGNIFICANT CHANGE UP
PLATELET # BLD AUTO: 248 K/UL — SIGNIFICANT CHANGE UP (ref 150–400)
PLATELET # BLD AUTO: 303 K/UL — SIGNIFICANT CHANGE UP (ref 150–400)
POTASSIUM SERPL-MCNC: 4.3 MMOL/L — SIGNIFICANT CHANGE UP (ref 3.5–5.3)
POTASSIUM SERPL-SCNC: 4.3 MMOL/L — SIGNIFICANT CHANGE UP (ref 3.5–5.3)
RBC # BLD: 2.57 M/UL — LOW (ref 3.8–5.2)
RBC # BLD: 2.8 M/UL — LOW (ref 3.8–5.2)
RBC # FLD: 15.2 % — HIGH (ref 10.3–14.5)
RBC # FLD: 16.2 % — HIGH (ref 10.3–14.5)
SODIUM SERPL-SCNC: 136 MMOL/L — SIGNIFICANT CHANGE UP (ref 135–145)
SPECIMEN SOURCE: SIGNIFICANT CHANGE UP
VANCOMYCIN FLD-MCNC: 21.1 UG/ML — SIGNIFICANT CHANGE UP
WBC # BLD: 7.51 K/UL — SIGNIFICANT CHANGE UP (ref 3.8–10.5)
WBC # BLD: 8.5 K/UL — SIGNIFICANT CHANGE UP (ref 3.8–10.5)
WBC # FLD AUTO: 7.51 K/UL — SIGNIFICANT CHANGE UP (ref 3.8–10.5)
WBC # FLD AUTO: 8.5 K/UL — SIGNIFICANT CHANGE UP (ref 3.8–10.5)

## 2019-06-17 PROCEDURE — 99232 SBSQ HOSP IP/OBS MODERATE 35: CPT

## 2019-06-17 RX ADMIN — Medication 1 DROP(S): at 12:59

## 2019-06-17 RX ADMIN — Medication 500 MILLIGRAM(S): at 12:58

## 2019-06-17 RX ADMIN — Medication 1 APPLICATION(S): at 12:56

## 2019-06-17 RX ADMIN — Medication 3 MILLILITER(S): at 17:21

## 2019-06-17 RX ADMIN — Medication 1 TABLET(S): at 12:58

## 2019-06-17 RX ADMIN — APIXABAN 5 MILLIGRAM(S): 2.5 TABLET, FILM COATED ORAL at 21:10

## 2019-06-17 RX ADMIN — APIXABAN 5 MILLIGRAM(S): 2.5 TABLET, FILM COATED ORAL at 09:21

## 2019-06-17 RX ADMIN — TIZANIDINE 4 MILLIGRAM(S): 4 TABLET ORAL at 09:22

## 2019-06-17 RX ADMIN — NYSTATIN CREAM 1 APPLICATION(S): 100000 CREAM TOPICAL at 21:11

## 2019-06-17 RX ADMIN — ZINC SULFATE TAB 220 MG (50 MG ZINC EQUIVALENT) 220 MILLIGRAM(S): 220 (50 ZN) TAB at 12:58

## 2019-06-17 RX ADMIN — CEFEPIME 100 MILLIGRAM(S): 1 INJECTION, POWDER, FOR SOLUTION INTRAMUSCULAR; INTRAVENOUS at 02:23

## 2019-06-17 RX ADMIN — Medication 1 APPLICATION(S): at 12:58

## 2019-06-17 RX ADMIN — NYSTATIN CREAM 1 APPLICATION(S): 100000 CREAM TOPICAL at 12:59

## 2019-06-17 RX ADMIN — Medication 3: at 12:41

## 2019-06-17 RX ADMIN — Medication 1 DROP(S): at 21:10

## 2019-06-17 RX ADMIN — Medication 7.5 MILLIGRAM(S): at 06:40

## 2019-06-17 RX ADMIN — Medication 1.5 MILLIGRAM(S): at 21:09

## 2019-06-17 RX ADMIN — GABAPENTIN 300 MILLIGRAM(S): 400 CAPSULE ORAL at 09:22

## 2019-06-17 RX ADMIN — Medication 0.5 MILLIGRAM(S): at 17:21

## 2019-06-17 RX ADMIN — CEFEPIME 100 MILLIGRAM(S): 1 INJECTION, POWDER, FOR SOLUTION INTRAMUSCULAR; INTRAVENOUS at 17:21

## 2019-06-17 RX ADMIN — GABAPENTIN 300 MILLIGRAM(S): 400 CAPSULE ORAL at 21:10

## 2019-06-17 RX ADMIN — Medication 300 MILLIGRAM(S): at 12:59

## 2019-06-17 RX ADMIN — Medication 3 MILLILITER(S): at 06:41

## 2019-06-17 RX ADMIN — Medication 0.5 MILLIGRAM(S): at 06:41

## 2019-06-17 RX ADMIN — CEFEPIME 100 MILLIGRAM(S): 1 INJECTION, POWDER, FOR SOLUTION INTRAMUSCULAR; INTRAVENOUS at 09:22

## 2019-06-17 RX ADMIN — FAMOTIDINE 20 MILLIGRAM(S): 10 INJECTION INTRAVENOUS at 12:59

## 2019-06-17 RX ADMIN — QUETIAPINE FUMARATE 25 MILLIGRAM(S): 200 TABLET, FILM COATED ORAL at 21:09

## 2019-06-17 RX ADMIN — CHLORHEXIDINE GLUCONATE 1 APPLICATION(S): 213 SOLUTION TOPICAL at 13:01

## 2019-06-17 RX ADMIN — TIZANIDINE 4 MILLIGRAM(S): 4 TABLET ORAL at 21:09

## 2019-06-18 LAB
ALBUMIN SERPL ELPH-MCNC: 3.2 G/DL — LOW (ref 3.3–5)
ALP SERPL-CCNC: 87 U/L — SIGNIFICANT CHANGE UP (ref 40–120)
ALT FLD-CCNC: 13 U/L — SIGNIFICANT CHANGE UP (ref 10–45)
ANION GAP SERPL CALC-SCNC: 14 MMOL/L — SIGNIFICANT CHANGE UP (ref 5–17)
AST SERPL-CCNC: 11 U/L — SIGNIFICANT CHANGE UP (ref 10–40)
BASOPHILS # BLD AUTO: 0.03 K/UL — SIGNIFICANT CHANGE UP (ref 0–0.2)
BASOPHILS NFR BLD AUTO: 0.3 % — SIGNIFICANT CHANGE UP (ref 0–2)
BILIRUB SERPL-MCNC: 0.2 MG/DL — SIGNIFICANT CHANGE UP (ref 0.2–1.2)
BUN SERPL-MCNC: 38 MG/DL — HIGH (ref 7–23)
CALCIUM SERPL-MCNC: 9.5 MG/DL — SIGNIFICANT CHANGE UP (ref 8.4–10.5)
CHLORIDE SERPL-SCNC: 95 MMOL/L — LOW (ref 96–108)
CO2 SERPL-SCNC: 26 MMOL/L — SIGNIFICANT CHANGE UP (ref 22–31)
CREAT SERPL-MCNC: 0.4 MG/DL — LOW (ref 0.5–1.3)
EOSINOPHIL # BLD AUTO: 0.45 K/UL — SIGNIFICANT CHANGE UP (ref 0–0.5)
EOSINOPHIL NFR BLD AUTO: 4.6 % — SIGNIFICANT CHANGE UP (ref 0–6)
GLUCOSE BLDC GLUCOMTR-MCNC: 122 MG/DL — HIGH (ref 70–99)
GLUCOSE BLDC GLUCOMTR-MCNC: 139 MG/DL — HIGH (ref 70–99)
GLUCOSE BLDC GLUCOMTR-MCNC: 151 MG/DL — HIGH (ref 70–99)
GLUCOSE BLDC GLUCOMTR-MCNC: 178 MG/DL — HIGH (ref 70–99)
GLUCOSE SERPL-MCNC: 216 MG/DL — HIGH (ref 70–99)
HCT VFR BLD CALC: 25.9 % — LOW (ref 34.5–45)
HGB BLD-MCNC: 7.9 G/DL — LOW (ref 11.5–15.5)
IMM GRANULOCYTES NFR BLD AUTO: 0.4 % — SIGNIFICANT CHANGE UP (ref 0–1.5)
LYMPHOCYTES # BLD AUTO: 0.38 K/UL — LOW (ref 1–3.3)
LYMPHOCYTES # BLD AUTO: 3.9 % — LOW (ref 13–44)
MANUAL SMEAR VERIFICATION: SIGNIFICANT CHANGE UP
MCHC RBC-ENTMCNC: 29.8 PG — SIGNIFICANT CHANGE UP (ref 27–34)
MCHC RBC-ENTMCNC: 30.5 GM/DL — LOW (ref 32–36)
MCV RBC AUTO: 97.7 FL — SIGNIFICANT CHANGE UP (ref 80–100)
MONOCYTES # BLD AUTO: 0.57 K/UL — SIGNIFICANT CHANGE UP (ref 0–0.9)
MONOCYTES NFR BLD AUTO: 5.8 % — SIGNIFICANT CHANGE UP (ref 2–14)
NEUTROPHILS # BLD AUTO: 8.28 K/UL — HIGH (ref 1.8–7.4)
NEUTROPHILS NFR BLD AUTO: 85 % — HIGH (ref 43–77)
PLAT MORPH BLD: NORMAL — SIGNIFICANT CHANGE UP
PLATELET # BLD AUTO: 310 K/UL — SIGNIFICANT CHANGE UP (ref 150–400)
POTASSIUM SERPL-MCNC: 4.3 MMOL/L — SIGNIFICANT CHANGE UP (ref 3.5–5.3)
POTASSIUM SERPL-SCNC: 4.3 MMOL/L — SIGNIFICANT CHANGE UP (ref 3.5–5.3)
PROT SERPL-MCNC: 6 G/DL — SIGNIFICANT CHANGE UP (ref 6–8.3)
RBC # BLD: 2.65 M/UL — LOW (ref 3.8–5.2)
RBC # FLD: 16.2 % — HIGH (ref 10.3–14.5)
RBC BLD AUTO: SIGNIFICANT CHANGE UP
SODIUM SERPL-SCNC: 135 MMOL/L — SIGNIFICANT CHANGE UP (ref 135–145)
VANCOMYCIN FLD-MCNC: 21.8 UG/ML — SIGNIFICANT CHANGE UP
WBC # BLD: 9.75 K/UL — SIGNIFICANT CHANGE UP (ref 3.8–10.5)
WBC # FLD AUTO: 9.75 K/UL — SIGNIFICANT CHANGE UP (ref 3.8–10.5)

## 2019-06-18 PROCEDURE — 99232 SBSQ HOSP IP/OBS MODERATE 35: CPT

## 2019-06-18 RX ORDER — VANCOMYCIN HCL 1 G
VIAL (EA) INTRAVENOUS
Refills: 0 | Status: DISCONTINUED | OUTPATIENT
Start: 2019-06-18 | End: 2019-06-19

## 2019-06-18 RX ORDER — VANCOMYCIN HCL 1 G
1000 VIAL (EA) INTRAVENOUS EVERY 24 HOURS
Refills: 0 | Status: DISCONTINUED | OUTPATIENT
Start: 2019-06-19 | End: 2019-06-19

## 2019-06-18 RX ORDER — VANCOMYCIN HCL 1 G
1000 VIAL (EA) INTRAVENOUS ONCE
Refills: 0 | Status: COMPLETED | OUTPATIENT
Start: 2019-06-18 | End: 2019-06-18

## 2019-06-18 RX ADMIN — TIZANIDINE 4 MILLIGRAM(S): 4 TABLET ORAL at 20:53

## 2019-06-18 RX ADMIN — FAMOTIDINE 20 MILLIGRAM(S): 10 INJECTION INTRAVENOUS at 12:54

## 2019-06-18 RX ADMIN — CEFEPIME 100 MILLIGRAM(S): 1 INJECTION, POWDER, FOR SOLUTION INTRAMUSCULAR; INTRAVENOUS at 01:02

## 2019-06-18 RX ADMIN — Medication 1 APPLICATION(S): at 12:55

## 2019-06-18 RX ADMIN — Medication 250 MILLIGRAM(S): at 08:05

## 2019-06-18 RX ADMIN — Medication 0.5 MILLIGRAM(S): at 05:51

## 2019-06-18 RX ADMIN — Medication 1: at 00:25

## 2019-06-18 RX ADMIN — Medication 1: at 12:54

## 2019-06-18 RX ADMIN — Medication 1 DROP(S): at 22:04

## 2019-06-18 RX ADMIN — QUETIAPINE FUMARATE 25 MILLIGRAM(S): 200 TABLET, FILM COATED ORAL at 22:03

## 2019-06-18 RX ADMIN — CEFEPIME 100 MILLIGRAM(S): 1 INJECTION, POWDER, FOR SOLUTION INTRAMUSCULAR; INTRAVENOUS at 08:19

## 2019-06-18 RX ADMIN — GABAPENTIN 300 MILLIGRAM(S): 400 CAPSULE ORAL at 20:53

## 2019-06-18 RX ADMIN — Medication 1 TABLET(S): at 12:54

## 2019-06-18 RX ADMIN — Medication 25 MILLIGRAM(S): at 08:11

## 2019-06-18 RX ADMIN — ZINC SULFATE TAB 220 MG (50 MG ZINC EQUIVALENT) 220 MILLIGRAM(S): 220 (50 ZN) TAB at 12:54

## 2019-06-18 RX ADMIN — Medication 1 DROP(S): at 05:52

## 2019-06-18 RX ADMIN — APIXABAN 5 MILLIGRAM(S): 2.5 TABLET, FILM COATED ORAL at 08:20

## 2019-06-18 RX ADMIN — CEFEPIME 100 MILLIGRAM(S): 1 INJECTION, POWDER, FOR SOLUTION INTRAMUSCULAR; INTRAVENOUS at 17:16

## 2019-06-18 RX ADMIN — Medication 3 MILLILITER(S): at 17:17

## 2019-06-18 RX ADMIN — Medication 0.5 MILLIGRAM(S): at 17:16

## 2019-06-18 RX ADMIN — NYSTATIN CREAM 1 APPLICATION(S): 100000 CREAM TOPICAL at 05:53

## 2019-06-18 RX ADMIN — GABAPENTIN 300 MILLIGRAM(S): 400 CAPSULE ORAL at 08:20

## 2019-06-18 RX ADMIN — Medication 7.5 MILLIGRAM(S): at 05:52

## 2019-06-18 RX ADMIN — Medication 300 MILLIGRAM(S): at 12:54

## 2019-06-18 RX ADMIN — Medication 3 MILLILITER(S): at 05:51

## 2019-06-18 RX ADMIN — Medication 1.5 MILLIGRAM(S): at 22:03

## 2019-06-18 RX ADMIN — APIXABAN 5 MILLIGRAM(S): 2.5 TABLET, FILM COATED ORAL at 20:53

## 2019-06-18 RX ADMIN — NYSTATIN CREAM 1 APPLICATION(S): 100000 CREAM TOPICAL at 15:06

## 2019-06-18 RX ADMIN — Medication 500 MILLIGRAM(S): at 12:54

## 2019-06-18 RX ADMIN — CHLORHEXIDINE GLUCONATE 1 APPLICATION(S): 213 SOLUTION TOPICAL at 15:07

## 2019-06-18 RX ADMIN — NYSTATIN CREAM 1 APPLICATION(S): 100000 CREAM TOPICAL at 22:04

## 2019-06-18 RX ADMIN — TIZANIDINE 4 MILLIGRAM(S): 4 TABLET ORAL at 08:21

## 2019-06-18 RX ADMIN — Medication 1 DROP(S): at 15:07

## 2019-06-19 ENCOUNTER — TRANSCRIPTION ENCOUNTER (OUTPATIENT)
Age: 69
End: 2019-06-19

## 2019-06-19 VITALS
RESPIRATION RATE: 18 BRPM | SYSTOLIC BLOOD PRESSURE: 129 MMHG | HEART RATE: 107 BPM | TEMPERATURE: 98 F | DIASTOLIC BLOOD PRESSURE: 80 MMHG | OXYGEN SATURATION: 99 %

## 2019-06-19 LAB
ANION GAP SERPL CALC-SCNC: 13 MMOL/L — SIGNIFICANT CHANGE UP (ref 5–17)
BASOPHILS # BLD AUTO: 0.03 K/UL — SIGNIFICANT CHANGE UP (ref 0–0.2)
BASOPHILS NFR BLD AUTO: 0.4 % — SIGNIFICANT CHANGE UP (ref 0–2)
BUN SERPL-MCNC: 36 MG/DL — HIGH (ref 7–23)
CALCIUM SERPL-MCNC: 9.4 MG/DL — SIGNIFICANT CHANGE UP (ref 8.4–10.5)
CHLORIDE SERPL-SCNC: 98 MMOL/L — SIGNIFICANT CHANGE UP (ref 96–108)
CO2 SERPL-SCNC: 26 MMOL/L — SIGNIFICANT CHANGE UP (ref 22–31)
CREAT SERPL-MCNC: 0.39 MG/DL — LOW (ref 0.5–1.3)
EOSINOPHIL # BLD AUTO: 1.56 K/UL — HIGH (ref 0–0.5)
EOSINOPHIL NFR BLD AUTO: 20.2 % — HIGH (ref 0–6)
GLUCOSE BLDC GLUCOMTR-MCNC: 104 MG/DL — HIGH (ref 70–99)
GLUCOSE BLDC GLUCOMTR-MCNC: 115 MG/DL — HIGH (ref 70–99)
GLUCOSE BLDC GLUCOMTR-MCNC: 118 MG/DL — HIGH (ref 70–99)
GLUCOSE BLDC GLUCOMTR-MCNC: 196 MG/DL — HIGH (ref 70–99)
GLUCOSE BLDC GLUCOMTR-MCNC: 217 MG/DL — HIGH (ref 70–99)
GLUCOSE SERPL-MCNC: 105 MG/DL — HIGH (ref 70–99)
HCT VFR BLD CALC: 26.4 % — LOW (ref 34.5–45)
HGB BLD-MCNC: 8.1 G/DL — LOW (ref 11.5–15.5)
IMM GRANULOCYTES NFR BLD AUTO: 0.8 % — SIGNIFICANT CHANGE UP (ref 0–1.5)
LYMPHOCYTES # BLD AUTO: 0.93 K/UL — LOW (ref 1–3.3)
LYMPHOCYTES # BLD AUTO: 12 % — LOW (ref 13–44)
MANUAL SMEAR VERIFICATION: SIGNIFICANT CHANGE UP
MCHC RBC-ENTMCNC: 29.7 PG — SIGNIFICANT CHANGE UP (ref 27–34)
MCHC RBC-ENTMCNC: 30.7 GM/DL — LOW (ref 32–36)
MCV RBC AUTO: 96.7 FL — SIGNIFICANT CHANGE UP (ref 80–100)
MONOCYTES # BLD AUTO: 0.62 K/UL — SIGNIFICANT CHANGE UP (ref 0–0.9)
MONOCYTES NFR BLD AUTO: 8 % — SIGNIFICANT CHANGE UP (ref 2–14)
NEUTROPHILS # BLD AUTO: 4.54 K/UL — SIGNIFICANT CHANGE UP (ref 1.8–7.4)
NEUTROPHILS NFR BLD AUTO: 58.6 % — SIGNIFICANT CHANGE UP (ref 43–77)
PLAT MORPH BLD: NORMAL — SIGNIFICANT CHANGE UP
PLATELET # BLD AUTO: 368 K/UL — SIGNIFICANT CHANGE UP (ref 150–400)
POTASSIUM SERPL-MCNC: 4 MMOL/L — SIGNIFICANT CHANGE UP (ref 3.5–5.3)
POTASSIUM SERPL-SCNC: 4 MMOL/L — SIGNIFICANT CHANGE UP (ref 3.5–5.3)
RBC # BLD: 2.73 M/UL — LOW (ref 3.8–5.2)
RBC # FLD: 16 % — HIGH (ref 10.3–14.5)
RBC BLD AUTO: SIGNIFICANT CHANGE UP
SODIUM SERPL-SCNC: 137 MMOL/L — SIGNIFICANT CHANGE UP (ref 135–145)
VANCOMYCIN TROUGH SERPL-MCNC: 18.6 UG/ML — SIGNIFICANT CHANGE UP (ref 10–20)
WBC # BLD: 7.74 K/UL — SIGNIFICANT CHANGE UP (ref 3.8–10.5)
WBC # FLD AUTO: 7.74 K/UL — SIGNIFICANT CHANGE UP (ref 3.8–10.5)

## 2019-06-19 PROCEDURE — 80048 BASIC METABOLIC PNL TOTAL CA: CPT

## 2019-06-19 PROCEDURE — 80053 COMPREHEN METABOLIC PANEL: CPT

## 2019-06-19 PROCEDURE — 82803 BLOOD GASES ANY COMBINATION: CPT

## 2019-06-19 PROCEDURE — 87086 URINE CULTURE/COLONY COUNT: CPT

## 2019-06-19 PROCEDURE — 87633 RESP VIRUS 12-25 TARGETS: CPT

## 2019-06-19 PROCEDURE — 87581 M.PNEUMON DNA AMP PROBE: CPT

## 2019-06-19 PROCEDURE — 84132 ASSAY OF SERUM POTASSIUM: CPT

## 2019-06-19 PROCEDURE — 87186 SC STD MICRODIL/AGAR DIL: CPT

## 2019-06-19 PROCEDURE — 81001 URINALYSIS AUTO W/SCOPE: CPT

## 2019-06-19 PROCEDURE — 83036 HEMOGLOBIN GLYCOSYLATED A1C: CPT

## 2019-06-19 PROCEDURE — 82962 GLUCOSE BLOOD TEST: CPT

## 2019-06-19 PROCEDURE — 83735 ASSAY OF MAGNESIUM: CPT

## 2019-06-19 PROCEDURE — 87486 CHLMYD PNEUM DNA AMP PROBE: CPT

## 2019-06-19 PROCEDURE — 87040 BLOOD CULTURE FOR BACTERIA: CPT

## 2019-06-19 PROCEDURE — 82947 ASSAY GLUCOSE BLOOD QUANT: CPT

## 2019-06-19 PROCEDURE — 82330 ASSAY OF CALCIUM: CPT

## 2019-06-19 PROCEDURE — 93005 ELECTROCARDIOGRAM TRACING: CPT

## 2019-06-19 PROCEDURE — 84100 ASSAY OF PHOSPHORUS: CPT

## 2019-06-19 PROCEDURE — 87798 DETECT AGENT NOS DNA AMP: CPT

## 2019-06-19 PROCEDURE — 87150 DNA/RNA AMPLIFIED PROBE: CPT

## 2019-06-19 PROCEDURE — 84295 ASSAY OF SERUM SODIUM: CPT

## 2019-06-19 PROCEDURE — 99285 EMERGENCY DEPT VISIT HI MDM: CPT | Mod: 25

## 2019-06-19 PROCEDURE — 82435 ASSAY OF BLOOD CHLORIDE: CPT

## 2019-06-19 PROCEDURE — 94640 AIRWAY INHALATION TREATMENT: CPT

## 2019-06-19 PROCEDURE — 80202 ASSAY OF VANCOMYCIN: CPT

## 2019-06-19 PROCEDURE — 71045 X-RAY EXAM CHEST 1 VIEW: CPT

## 2019-06-19 PROCEDURE — 85027 COMPLETE CBC AUTOMATED: CPT

## 2019-06-19 PROCEDURE — 99232 SBSQ HOSP IP/OBS MODERATE 35: CPT

## 2019-06-19 PROCEDURE — 85014 HEMATOCRIT: CPT

## 2019-06-19 PROCEDURE — 96374 THER/PROPH/DIAG INJ IV PUSH: CPT

## 2019-06-19 PROCEDURE — 85730 THROMBOPLASTIN TIME PARTIAL: CPT

## 2019-06-19 PROCEDURE — 85610 PROTHROMBIN TIME: CPT

## 2019-06-19 PROCEDURE — 83605 ASSAY OF LACTIC ACID: CPT

## 2019-06-19 RX ORDER — ACETAMINOPHEN 500 MG
20 TABLET ORAL
Qty: 0 | Refills: 0 | DISCHARGE

## 2019-06-19 RX ADMIN — Medication 300 MILLIGRAM(S): at 12:22

## 2019-06-19 RX ADMIN — Medication 2: at 12:36

## 2019-06-19 RX ADMIN — ZINC SULFATE TAB 220 MG (50 MG ZINC EQUIVALENT) 220 MILLIGRAM(S): 220 (50 ZN) TAB at 12:22

## 2019-06-19 RX ADMIN — Medication 250 MILLIGRAM(S): at 06:45

## 2019-06-19 RX ADMIN — CEFEPIME 100 MILLIGRAM(S): 1 INJECTION, POWDER, FOR SOLUTION INTRAMUSCULAR; INTRAVENOUS at 16:30

## 2019-06-19 RX ADMIN — Medication 500 MILLIGRAM(S): at 12:22

## 2019-06-19 RX ADMIN — Medication 7.5 MILLIGRAM(S): at 06:01

## 2019-06-19 RX ADMIN — Medication 1 TABLET(S): at 12:21

## 2019-06-19 RX ADMIN — Medication 0.5 MILLIGRAM(S): at 06:02

## 2019-06-19 RX ADMIN — NYSTATIN CREAM 1 APPLICATION(S): 100000 CREAM TOPICAL at 12:56

## 2019-06-19 RX ADMIN — Medication 3 MILLILITER(S): at 06:02

## 2019-06-19 RX ADMIN — GABAPENTIN 300 MILLIGRAM(S): 400 CAPSULE ORAL at 09:05

## 2019-06-19 RX ADMIN — CHLORHEXIDINE GLUCONATE 1 APPLICATION(S): 213 SOLUTION TOPICAL at 12:38

## 2019-06-19 RX ADMIN — Medication 1 APPLICATION(S): at 12:54

## 2019-06-19 RX ADMIN — Medication 25 MILLIGRAM(S): at 06:25

## 2019-06-19 RX ADMIN — Medication 0.5 MILLIGRAM(S): at 17:19

## 2019-06-19 RX ADMIN — Medication 3 MILLILITER(S): at 17:19

## 2019-06-19 RX ADMIN — Medication 1 DROP(S): at 12:56

## 2019-06-19 RX ADMIN — Medication 1 DROP(S): at 06:02

## 2019-06-19 RX ADMIN — APIXABAN 5 MILLIGRAM(S): 2.5 TABLET, FILM COATED ORAL at 09:05

## 2019-06-19 RX ADMIN — FAMOTIDINE 20 MILLIGRAM(S): 10 INJECTION INTRAVENOUS at 12:55

## 2019-06-19 RX ADMIN — Medication 1 APPLICATION(S): at 12:57

## 2019-06-19 RX ADMIN — TIZANIDINE 4 MILLIGRAM(S): 4 TABLET ORAL at 09:05

## 2019-06-19 RX ADMIN — CEFEPIME 100 MILLIGRAM(S): 1 INJECTION, POWDER, FOR SOLUTION INTRAMUSCULAR; INTRAVENOUS at 10:25

## 2019-06-19 RX ADMIN — CEFEPIME 100 MILLIGRAM(S): 1 INJECTION, POWDER, FOR SOLUTION INTRAMUSCULAR; INTRAVENOUS at 00:21

## 2019-06-19 RX ADMIN — NYSTATIN CREAM 1 APPLICATION(S): 100000 CREAM TOPICAL at 06:02

## 2019-06-19 NOTE — PROGRESS NOTE ADULT - PROVIDER SPECIALTY LIST ADULT
Family Medicine
Infectious Disease
Family Medicine
Infectious Disease

## 2019-06-19 NOTE — DISCHARGE NOTE PROVIDER - HOSPITAL COURSE
69 year old Female with multiple hospitalizations readmitted again with fever and leukocytosis, +UTI and positive blood cultures.        Blood cultures 4/4 with GPC clusters.  1st set id'd as CoNS. Has had this before. ?significance of this.  Repeat blood cultures in am.      Vanco level is high.  Will d/c for now and check level in am. Once less than 15 can redose at 1 gram IV daily. ( Was on 750 mg Iv q12)    +UTI with pseudomonas (only sensitive to Cefepime) and klebsiella.  Continue cefepime.        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    Fracture of left leg from osteoporosis     Severe dementia  unresponsive.    Recurrent fever though to be due to ongoing mrsa infection of hip.    Not candidate for any surgery    Severe decubitus.    Current fever is not likely due to pna '        to compete cefepime for uti and brief course for cns  No need to work up for endocarditis     Could be uti or ongoing mrsa infection in hip.     Has tolerated vanco and cefepime in the past despite "vanco allergy"    prognosis dismal, follow up Blood culture negative    Discharge home with

## 2019-06-19 NOTE — PROGRESS NOTE ADULT - REASON FOR ADMISSION
fever and lethargy

## 2019-06-19 NOTE — DISCHARGE NOTE PROVIDER - NSDCCPCAREPLAN_GEN_ALL_CORE_FT
PRINCIPAL DISCHARGE DIAGNOSIS  Diagnosis: UTI (urinary tract infection)  Assessment and Plan of Treatment: completed antibiotic. Follow up with PCP      SECONDARY DISCHARGE DIAGNOSES  Diagnosis: History of DVT (deep vein thrombosis)  Assessment and Plan of Treatment: History of DVT (deep vein thrombosis)- Eliquis    Diagnosis: Anemia of chronic disease  Assessment and Plan of Treatment: monitor with PCP    Diagnosis: Sacral decubitus ulcer, stage IV  Assessment and Plan of Treatment: wound care

## 2019-06-19 NOTE — DISCHARGE NOTE PROVIDER - CARE PROVIDER_API CALL
Deniz Bolden (DO)  Family Medicine  [address] [place] [address]  Phone: [phone]  Fax: [phone]  Follow Up Time: 1 week

## 2019-06-19 NOTE — PROGRESS NOTE ADULT - ASSESSMENT
69 yr old well known to my associate from multiple hospitalizations readmitted again with fever and leukocytosis, +UTI and positive blood cultures.    Blood cultures 4/4 with GPC clusters.  1st set id'd as CoNS. Has had this before. ?significance of this.  Repeat blood cultures in am.    Vanco level is high.  Will d/c for now and check level in am. Once less than 15 can redose at 1 gram IV daily. ( Was on 750 mg Iv q12)  +UTI with pseudomonas (only sensitive to Cefepime) and klebsiella.  Continue cefepime.      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  Fracture of left leg from osteoporosis   Severe dementia  unresponsive.  Recurrent fever though to be due to ongoing mrsa infection of hip.  Not candidate for any surgery  Severe decubitus.  Current fever is not likely due to pna '    to compete cefepime for uti and brief course for cns  I dont  think we need to work up for endocarditis but will get follow up bc  plan to compete ab late Wednesday   Could be uti or ongoing mrsa infection in hip.     Has tolerated vanco and cefepime in the past despite "vanco allergy"    prognosis dismal   vanco on hold until level comes down  sing of cns in blood cultures unclear    would not work up for endocarditis at present         D/w NP at length.    Janeen Domínguez MD  [phone] (pager)  [phone] (office)
69 yr old well known to my associate from multiple hospitalizations readmitted again with fever and leukocytosis, +UTI and positive blood cultures.    Blood cultures 4/4 with GPC clusters.  1st set id'd as CoNS. Has had this before. ?significance of this.  Repeat blood cultures in am.    Vanco level is high.  Will d/c for now and check level in am. Once less than 15 can redose at 1 gram IV daily. ( Was on 750 mg Iv q12)  +UTI with pseudomonas (only sensitive to Cefepime) and klebsiella.  Continue cefepime.      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  Fracture of left leg from osteoporosis   Severe dementia  unresponsive.  Recurrent fever though to be due to ongoing mrsa infection of hip.  Not candidate for any surgery  Severe decubitus.  Current fever is not likely due to pna '    to compete cefepime for uti and brief course for cns  I dont  think we need to work up for endocarditis but will get follow up bc  plan to compete ab late Wednesday   Could be uti or ongoing mrsa infection in hip.     Has tolerated vanco and cefepime in the past despite "vanco allergy"    prognosis dismal    follow up bc negative    plan discharge today
69 yr old well known to my associate from multiple hospitalizations readmitted again with fever and leukocytosis, +UTI and positive blood cultures.    Blood cultures 4/4 with GPC clusters.  1st set id'd as CoNS. Has had this before. ?significance of this.  Repeat blood cultures in am.    Vanco level is high.  Will d/c for now and check level in am. Once less than 15 can redose at 1 gram IV daily. ( Was on 750 mg Iv q12)  +UTI with pseudomonas (only sensitive to Cefepime) and klebsiella.  Continue cefepime.      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  Fracture of left leg from osteoporosis   Severe dementia  unresponsive.  Recurrent fever though to be due to ongoing mrsa infection of hip.  Not candidate for any surgery  Severe decubitus.  Current fever is not likely due to pna '  Could be uti or ongoing mrsa infection in hip.     Has tolerated vanco and cefepime in the past despite "vanco allergy"    prognosis dismal   vanco on hold until level comes down  sing of cns in blood cultures unclear    would not work up for endocarditis at present         D/w NP at length.    Janeen Domínguez MD  [phone] (pager)  [phone] (office)
69 yr old well known to my associate from multiple hospitalizations readmitted again with fever and leukocytosis, +UTI and positive blood cultures.    Blood cultures 4/4 with GPC clusters.  1st set id'd as CoNS. Has had this before. ?significance of this.  Repeat blood cultures in am.    Vanco level is high.  Will d/c for now and check level in am. Once less than 15 can redose at 1 gram IV daily. ( Was on 750 mg Iv q12)  +UTI with pseudomonas (only sensitive to Cefepime) and klebsiella.  Continue cefepime.      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  Fracture of left leg from osteoporosis   Severe dementia  unresponsive.  Recurrent fever though to be due to ongoing mrsa infection of hip.  Not candidate for any surgery  Severe decubitus.  Current fever is not likely due to pna '  Could be uti or ongoing mrsa infection in hip.  Skin is red on legs and she could have cellulitis - on vanco.  Has tolerated vanco and cefepime in the past despite "vanco allergy"    prognosis dismal         D/w NP at length.    Janeen Domínguez MD  [phone] (pager)  [phone] (office)

## 2019-06-19 NOTE — PROGRESS NOTE ADULT - SUBJECTIVE AND OBJECTIVE BOX
---___---___---___---___---___---___ ---___---___---___---___---___---___---___---___---                  M E D I C A L   A T T E N D I N G   P R O G R E S S   N O T E  ---___---___---___---___---___---___ ---___---___---___---___---___---___---___---___---        ================================================    ++CHIEF COMPLAINT:   Patient is a 69y old  Female who presents with a chief complaint of fever and lethargy (2019 07:45)      Urinary tract infection has been doing  better wbc going  down     ---___---___---___---___---___---  PAST MEDICAL / Surgical  HISTORY:  PAST MEDICAL & SURGICAL HISTORY:  CVA (cerebral vascular accident)  Fatty pancreas  PNA (pneumonia)  Pulmonary HTN  IGT (impaired glucose tolerance)  Ulcerative colitis  Acid reflux  Anxiety  Depression  Mouth sores  HLD (hyperlipidemia)  Asthma  Humeral head fracture  H/O: hysterectomy  H/O cataract extraction, left  History of knee replacement      ---___---___---___---___---___---  FAMILY HISTORY:   FAMILY HISTORY:  Family history of dementia (Grandparent)  Family history of colon cancer (Grandparent)        ---___---___---___---___---___---  ALLERGIES:   Allergies    ASA; dye contrast (Anaphylaxis)  aspirin (Short breath)  divalproex sodium (Other (Unknown))  Flowers and Plants (Short breath)  Haldol (Other (Unknown))  penicillin (Short breath; Rash)  sulfa drugs (Short breath; Rash)  vancomycin (Rash; Urticaria; Hives)  Xanax (Other (Unknown))    Intolerances        ---___---___---___---___---___---  MEDICATIONS:  MEDICATIONS  (STANDING):  ALBUTerol/ipratropium for Nebulization 3 milliLiter(s) Nebulizer every 12 hours  apixaban 5 milliGRAM(s) Oral every 12 hours  artificial  tears Solution 1 Drop(s) Both EYES three times a day  ascorbic acid 500 milliGRAM(s) Oral daily  buDESOnide   0.5 milliGRAM(s) Respule 0.5 milliGRAM(s) Inhalation two times a day  cefepime   IVPB      cefepime   IVPB 1000 milliGRAM(s) IV Intermittent every 8 hours  chlorhexidine 2% Cloths 1 Application(s) Topical daily  clonazePAM  Tablet 1.5 milliGRAM(s) Oral at bedtime  collagenase Ointment 1 Application(s) Topical daily  collagenase Ointment 1 Application(s) Topical daily  dextrose 5%. 1000 milliLiter(s) (50 mL/Hr) IV Continuous <Continuous>  dextrose 50% Injectable 12.5 Gram(s) IV Push once  dextrose 50% Injectable 25 Gram(s) IV Push once  dextrose 50% Injectable 25 Gram(s) IV Push once  diphenoxylate/atropine 1 Tablet(s) Oral daily  famotidine   Suspension 20 milliGRAM(s) Oral daily  ferrous    sulfate Liquid 300 milliGRAM(s) Enteral Tube daily  gabapentin   Solution 300 milliGRAM(s) Oral two times a day  insulin lispro (HumaLOG) corrective regimen sliding scale   SubCutaneous every 6 hours  Medical Marijuana Awake Oil 2 milliLiter(s) 2 milliLiter(s) Oral <User Schedule>  Medical Marijuana Calm Oil 2 milliLiter(s) 2 milliLiter(s) Oral <User Schedule>  Medical Marijuana [place] Oil 3 milliLiter(s) 3 milliLiter(s) Oral <User Schedule>  multivitamin/minerals 1 Tablet(s) Oral daily  nystatin Powder 1 Application(s) Topical three times a day  predniSONE  Solution 7.5 milliGRAM(s) Oral daily  QUEtiapine 25 milliGRAM(s) Oral at bedtime  sodium chloride 0.9%. 1000 milliLiter(s) (50 mL/Hr) IV Continuous <Continuous>  tiZANidine 4 milliGRAM(s) Oral <User Schedule>  zinc sulfate 220 milliGRAM(s) Oral daily    MEDICATIONS  (PRN):  acetaminophen    Suspension .. 650 milliGRAM(s) Oral every 6 hours PRN Temp greater or equal to 38C (100.4F)  dextrose 40% Gel 15 Gram(s) Oral once PRN Blood Glucose LESS THAN 70 milliGRAM(s)/deciliter  diphenhydrAMINE   Injectable 25 milliGRAM(s) IntraMuscular every 12 hours PRN Rash and/or Itching  glucagon  Injectable 1 milliGRAM(s) IntraMuscular once PRN Glucose LESS THAN 70 milligrams/deciliter      ---___---___---___---___---___---  REVIEW OF SYSTEM:  unable to obtain     ---___---___---___---___---___---  VITAL SIGNS:  69y , CAPILLARY BLOOD GLUCOSE      POCT Blood Glucose.: 115 mg/dL (2019 17:06)    T(C): 36.6 (19 @ 13:06), Max: 36.6 (19 @ 06:38)  HR: 95 (19 @ 13:06) (88 - 95)  BP: 153/66 (19 @ 13:06) (95/59 - 153/66)  RR: 18 (19 @ 13:06) (18 - 18)  SpO2: 100% (19 @ 13:06) (100% - 100%)  ---___---___---___---___---___---  PHYSICAL EXAM:    GEN: A&O X 0, NAD , comfortable  HEENT: NCAT, PERRL, MMM, hearing intact  Neck: supple , no JVD  CVS: S1S2 , regular , No M/R/G appreciated  PULM: CTA B/L,  no W/R/R appreciated  ABD.: soft. non tender, non distended,  bowel sounds present peg noted  Extrem:  contractutres  and left leg brace  Derm: No rash , no ecchymoses stage 4 sacral decubitus         ---___---___---___---___---___---            LAB AND IMAGIN.3    8.5   )-----------( 303      ( 2019 12:19 )             25.8               06-17    136  |  97  |  38<H>  ----------------------------<  114<H>  4.3   |  25  |  0.37<L>    Ca    9.2      2019 06:58                                  [All pertinent / recent Imaging reviewed]         ---___---___---___---___---___---___ ---___---___---___---___---                         A S S E S S M E N T   A N D   P L A N :      HEALTH ISSUES - PROBLEM Dx:     Diabetes mellitus: Diabetes mellitus continue insulin lispro  History of DVT (deep vein thrombosis): History of DVT (deep vein thrombosis) on eliquis  Asthma: Asthma on budesonide  Severe sepsis: Severe sepsis  UTI (urinary tract infection): UTI (urinary tract infection) continue abx   Altered mental status: Altered mental status  chronic in nature    systolic chf  ~ 20-25% ef  contiue meds for agitation and anxiety   meds for pain ( medical marijuana)     patient overall  prognosis remains poor               -GI/DVT Prophylaxis.    --------------------------------------------  Case discussed with   Education given on   ___________________________  Thank you,  Deniz Bolden  [phone]
infectious diseases progress note:    Patient is a 69y old  Female who presents with a chief complaint of fever and lethargy (17 Jun 2019 17:09)        Urinary tract infection        ROS:  CONSTITUTIONAL:  Negative fever or chills, feels well, good appetite  EYES:  Negative  blurry vision or double vision  CARDIOVASCULAR:  Negative for chest pain or palpitations  RESPIRATORY:  Negative for cough, wheezing, or SOB   GASTROINTESTINAL:  Negative for nausea, vomiting, diarrhea, constipation, or abdominal pain  GENITOURINARY:  Negative frequency, urgency or dysuria  NEUROLOGIC:  No headache, confusion, dizziness, lightheadedness    Allergies    ASA; dye contrast (Anaphylaxis)  aspirin (Short breath)  divalproex sodium (Other (Unknown))  Flowers and Plants (Short breath)  Haldol (Other (Unknown))  penicillin (Short breath; Rash)  sulfa drugs (Short breath; Rash)  vancomycin (Rash; Urticaria; Hives)  Xanax (Other (Unknown))    Intolerances        ANTIBIOTICS/RELEVANT:  antimicrobials  cefepime   IVPB      cefepime   IVPB 1000 milliGRAM(s) IV Intermittent every 8 hours  vancomycin  IVPB        immunologic:    OTHER:  acetaminophen    Suspension .. 650 milliGRAM(s) Oral every 6 hours PRN  ALBUTerol/ipratropium for Nebulization 3 milliLiter(s) Nebulizer every 12 hours  apixaban 5 milliGRAM(s) Oral every 12 hours  artificial  tears Solution 1 Drop(s) Both EYES three times a day  ascorbic acid 500 milliGRAM(s) Oral daily  buDESOnide   0.5 milliGRAM(s) Respule 0.5 milliGRAM(s) Inhalation two times a day  chlorhexidine 2% Cloths 1 Application(s) Topical daily  clonazePAM  Tablet 1.5 milliGRAM(s) Oral at bedtime  collagenase Ointment 1 Application(s) Topical daily  collagenase Ointment 1 Application(s) Topical daily  dextrose 40% Gel 15 Gram(s) Oral once PRN  dextrose 5%. 1000 milliLiter(s) IV Continuous <Continuous>  dextrose 50% Injectable 12.5 Gram(s) IV Push once  dextrose 50% Injectable 25 Gram(s) IV Push once  dextrose 50% Injectable 25 Gram(s) IV Push once  diphenhydrAMINE   Injectable 25 milliGRAM(s) IntraMuscular every 12 hours PRN  diphenoxylate/atropine 1 Tablet(s) Oral daily  famotidine   Suspension 20 milliGRAM(s) Oral daily  ferrous    sulfate Liquid 300 milliGRAM(s) Enteral Tube daily  gabapentin   Solution 300 milliGRAM(s) Oral two times a day  glucagon  Injectable 1 milliGRAM(s) IntraMuscular once PRN  insulin lispro (HumaLOG) corrective regimen sliding scale   SubCutaneous every 6 hours  Medical Marijuana Awake Oil 2 milliLiter(s) 2 milliLiter(s) Oral <User Schedule>  Medical Marijuana Calm Oil 2 milliLiter(s) 2 milliLiter(s) Oral <User Schedule>  Medical Marijuana [place] Oil 3 milliLiter(s) 3 milliLiter(s) Oral <User Schedule>  multivitamin/minerals 1 Tablet(s) Oral daily  nystatin Powder 1 Application(s) Topical three times a day  predniSONE  Solution 7.5 milliGRAM(s) Oral daily  QUEtiapine 25 milliGRAM(s) Oral at bedtime  sodium chloride 0.9%. 1000 milliLiter(s) IV Continuous <Continuous>  tiZANidine 4 milliGRAM(s) Oral <User Schedule>  zinc sulfate 220 milliGRAM(s) Oral daily      Objective:  Vital Signs Last 24 Hrs  T(C): 36.6 (18 Jun 2019 05:09), Max: 36.6 (17 Jun 2019 13:06)  T(F): 97.9 (18 Jun 2019 05:09), Max: 97.9 (18 Jun 2019 05:09)  HR: 87 (18 Jun 2019 05:09) (87 - 104)  BP: 149/73 (18 Jun 2019 05:09) (117/76 - 153/66)  BP(mean): --  RR: 17 (18 Jun 2019 05:09) (17 - 18)  SpO2: 100% (18 Jun 2019 05:09) (100% - 100%)    PHYSICAL EXAM:  Constitutional:Well-developed, well nourished--no acute distress  Eyes:JACQUELIN, EOMI  Ear/Nose/Throat: no oral lesion, no sinus tenderness on percussion	  Neck:no JVD, no lymphadenopathy, supple  Respiratory: CTA maryjane  Cardiovascular: S1S2 RRR, no murmurs  Gastrointestinal:soft, (+) BS, no HSM  Extremities:no e/e/c        LABS:                        8.3    8.5   )-----------( 303      ( 17 Jun 2019 12:19 )             25.8     06-17    136  |  97  |  38<H>  ----------------------------<  114<H>  4.3   |  25  |  0.37<L>    Ca    9.2      17 Jun 2019 06:58              MICROBIOLOGY:    RECENT CULTURES:  06-13 @ 21:28 .Urine   BLANCA      Klebsiella oxytoca  Pseudomonas aeruginosa (Carbapenem Resistant)  Klebsiella oxytoca     >100,000 CFU/ml Klebsiella oxytoca  >100,000 CFU/ml Pseudomonas aeruginosa (Carbapenem Resistant)    06-13 @ 18:25 .Blood   BLANCA    Growth in anaerobic bottle: Gram Positive Cocci in Clusters    Staphylococcus epidermidis  Staphylococcus epidermidis     Growth in anaerobic bottle: Staphylococcus epidermidis          RESPIRATORY CULTURES:              RADIOLOGY & ADDITIONAL STUDIES:        Pager [phone]  After 5 pm/weekends or if no response :[phone]
---___---___---___---___---___---___ ---___---___---___---___---___---___---___---___---                  M E D I C A L   A T T E N D I N G   P R O G R E S S   N O T E  ---___---___---___---___---___---___ ---___---___---___---___---___---___---___---___---        ================================================    ++CHIEF COMPLAINT:   Patient is a 69y old  Female who presents with a chief complaint of fever and lethargy (14 Jun 2019 14:36)      Urinary tract infection hypotension improved     ---___---___---___---___---___---  PAST MEDICAL / Surgical  HISTORY:  PAST MEDICAL & SURGICAL HISTORY:  CVA (cerebral vascular accident)  Fatty pancreas  PNA (pneumonia)  Pulmonary HTN  IGT (impaired glucose tolerance)  Ulcerative colitis  Acid reflux  Anxiety  Depression  Mouth sores  HLD (hyperlipidemia)  Asthma  Humeral head fracture  H/O: hysterectomy  H/O cataract extraction, left  History of knee replacement      ---___---___---___---___---___---  FAMILY HISTORY:   FAMILY HISTORY:  Family history of dementia (Grandparent)  Family history of colon cancer (Grandparent)        ---___---___---___---___---___---  ALLERGIES:   Allergies    ASA; dye contrast (Anaphylaxis)  aspirin (Short breath)  divalproex sodium (Other (Unknown))  Flowers and Plants (Short breath)  Haldol (Other (Unknown))  penicillin (Short breath; Rash)  sulfa drugs (Short breath; Rash)  vancomycin (Rash; Urticaria; Hives)  Xanax (Other (Unknown))    Intolerances        ---___---___---___---___---___---  MEDICATIONS:  MEDICATIONS  (STANDING):  ALBUTerol/ipratropium for Nebulization 3 milliLiter(s) Nebulizer every 12 hours  apixaban 5 milliGRAM(s) Oral every 12 hours  artificial  tears Solution 1 Drop(s) Both EYES three times a day  ascorbic acid 500 milliGRAM(s) Oral daily  buDESOnide   0.5 milliGRAM(s) Respule 0.5 milliGRAM(s) Inhalation two times a day  cefepime   IVPB      cefepime   IVPB 1000 milliGRAM(s) IV Intermittent every 8 hours  chlorhexidine 2% Cloths 1 Application(s) Topical daily  clonazePAM  Tablet 1.5 milliGRAM(s) Oral at bedtime  collagenase Ointment 1 Application(s) Topical daily  collagenase Ointment 1 Application(s) Topical daily  dextrose 5%. 1000 milliLiter(s) (50 mL/Hr) IV Continuous <Continuous>  dextrose 50% Injectable 12.5 Gram(s) IV Push once  dextrose 50% Injectable 25 Gram(s) IV Push once  dextrose 50% Injectable 25 Gram(s) IV Push once  diphenoxylate/atropine 1 Tablet(s) Oral daily  famotidine   Suspension 20 milliGRAM(s) Oral daily  ferrous    sulfate Liquid 300 milliGRAM(s) Enteral Tube daily  gabapentin   Solution 300 milliGRAM(s) Oral two times a day  insulin lispro (HumaLOG) corrective regimen sliding scale   SubCutaneous every 6 hours  Medical Marijuana Awake Oil 2 milliLiter(s) 2 milliLiter(s) Oral <User Schedule>  Medical Marijuana Calm Oil 2 milliLiter(s) 2 milliLiter(s) Oral <User Schedule>  Medical Marijuana [place] Oil 3 milliLiter(s) 3 milliLiter(s) Oral <User Schedule>  multivitamin/minerals 1 Tablet(s) Oral daily  nystatin Powder 1 Application(s) Topical three times a day  predniSONE  Solution 7.5 milliGRAM(s) Oral daily  QUEtiapine 25 milliGRAM(s) Oral at bedtime  sodium chloride 0.9%. 1000 milliLiter(s) (50 mL/Hr) IV Continuous <Continuous>  tiZANidine 4 milliGRAM(s) Oral <User Schedule>  vancomycin  IVPB 750 milliGRAM(s) IV Intermittent every 12 hours  zinc sulfate 220 milliGRAM(s) Oral daily    MEDICATIONS  (PRN):  acetaminophen    Suspension .. 650 milliGRAM(s) Oral every 6 hours PRN Temp greater or equal to 38C (100.4F)  dextrose 40% Gel 15 Gram(s) Oral once PRN Blood Glucose LESS THAN 70 milliGRAM(s)/deciliter  diphenhydrAMINE   Injectable 25 milliGRAM(s) IntraMuscular every 12 hours PRN Rash and/or Itching  glucagon  Injectable 1 milliGRAM(s) IntraMuscular once PRN Glucose LESS THAN 70 milligrams/deciliter      ---___---___---___---___---___---  REVIEW OF SYSTEM:    unable to obtain     ---___---___---___---___---___---  VITAL SIGNS:  69y , CAPILLARY BLOOD GLUCOSE      POCT Blood Glucose.: 255 mg/dL (15 Aryan 2019 12:37)    T(C): 36.8 (06-15-19 @ 13:20), Max: 38.1 (06-15-19 @ 06:35)  HR: 77 (06-15-19 @ 13:20) (77 - 99)  BP: 109/63 (06-15-19 @ 13:20) (93/59 - 110/73)  RR: 18 (06-15-19 @ 13:20) (16 - 18)  SpO2: 100% (06-15-19 @ 13:20) (98% - 100%)  ---___---___---___---___---___---  PHYSICAL EXAM:    GEN: A&O X 0 , NAD , comfortable  HEENT: NCAT, PERRL, MMM, hearing intact  Neck: supple , no JVD  CVS: S1S2 , regular , No M/R/G appreciated  PULM: CTA B/L,  no W/R/R appreciated  ABD.: soft. non tender, non distended,  bowel sounds present  Extrem: intact pulses , no edema   Derm: No rash , no ecchymoses stage 4 sacral decubitus       ---___---___---___---___---___---            LAB AND IMAGING:                          10.2   16.52 )-----------( 332      ( 15 Aryan 2019 10:14 )             33.4               06-15    125<L>  |  89<L>  |  39<H>  ----------------------------<  184<H>  4.4   |  23  |  0.75    Ca    9.0      15 Aryan 2019 07:17  Phos  2.5     06-14  Mg     1.7     06-14    TPro  5.8<L>  /  Alb  3.0<L>  /  TBili  0.6  /  DBili  x   /  AST  12  /  ALT  8<L>  /  AlkPhos  81  06-14                                [All pertinent / recent Imaging reviewed]         ---___---___---___---___---___---___ ---___---___---___---___---                         A S S E S S M E N T   A N D   P L A N :      HEALTH ISSUES - PROBLEM Dx:     Depression: Depression continue seroquel and supportive care   Diabetes mellitus: Diabetes mellitus on riss  History of DVT (deep vein thrombosis): History of DVT (deep vein thrombosis) continue eloquis   Asthma: Asthma on budesonide  Severe sepsis: Severe sepsis on abx and improving   UTI (urinary tract infection): UTI (urinary tract infection) continue iv abx with change to po when and if cultures permit   Altered mental status: Altered mental status likely secondary to infection   -GI/DVT Prophylaxis.       Thank you,  Deniz Bolden  [phone]
---___---___---___---___---___---___ ---___---___---___---___---___---___---___---___---                  M E D I C A L   A T T E N D I N G   P R O G R E S S   N O T E  ---___---___---___---___---___---___ ---___---___---___---___---___---___---___---___---        ================================================    ++CHIEF COMPLAINT:   Patient is a 69y old  Female who presents with a chief complaint of fever and lethargy (15 Aryan 2019 16:08)      Urinary tract infection    ---___---___---___---___---___---  PAST MEDICAL / Surgical  HISTORY:  PAST MEDICAL & SURGICAL HISTORY:  CVA (cerebral vascular accident)  Fatty pancreas  PNA (pneumonia)  Pulmonary HTN  IGT (impaired glucose tolerance)  Ulcerative colitis  Acid reflux  Anxiety  Depression  Mouth sores  HLD (hyperlipidemia)  Asthma  Humeral head fracture  H/O: hysterectomy  H/O cataract extraction, left  History of knee replacement      ---___---___---___---___---___---  FAMILY HISTORY:   FAMILY HISTORY:  Family history of dementia (Grandparent)  Family history of colon cancer (Grandparent)        ---___---___---___---___---___---  ALLERGIES:   Allergies    ASA; dye contrast (Anaphylaxis)  aspirin (Short breath)  divalproex sodium (Other (Unknown))  Flowers and Plants (Short breath)  Haldol (Other (Unknown))  penicillin (Short breath; Rash)  sulfa drugs (Short breath; Rash)  vancomycin (Rash; Urticaria; Hives)  Xanax (Other (Unknown))    Intolerances        ---___---___---___---___---___---  MEDICATIONS:  MEDICATIONS  (STANDING):  ALBUTerol/ipratropium for Nebulization 3 milliLiter(s) Nebulizer every 12 hours  apixaban 5 milliGRAM(s) Oral every 12 hours  artificial  tears Solution 1 Drop(s) Both EYES three times a day  ascorbic acid 500 milliGRAM(s) Oral daily  buDESOnide   0.5 milliGRAM(s) Respule 0.5 milliGRAM(s) Inhalation two times a day  cefepime   IVPB      cefepime   IVPB 1000 milliGRAM(s) IV Intermittent every 8 hours  chlorhexidine 2% Cloths 1 Application(s) Topical daily  clonazePAM  Tablet 1.5 milliGRAM(s) Oral at bedtime  collagenase Ointment 1 Application(s) Topical daily  collagenase Ointment 1 Application(s) Topical daily  dextrose 5%. 1000 milliLiter(s) (50 mL/Hr) IV Continuous <Continuous>  dextrose 50% Injectable 12.5 Gram(s) IV Push once  dextrose 50% Injectable 25 Gram(s) IV Push once  dextrose 50% Injectable 25 Gram(s) IV Push once  diphenoxylate/atropine 1 Tablet(s) Oral daily  famotidine   Suspension 20 milliGRAM(s) Oral daily  ferrous    sulfate Liquid 300 milliGRAM(s) Enteral Tube daily  gabapentin   Solution 300 milliGRAM(s) Oral two times a day  insulin lispro (HumaLOG) corrective regimen sliding scale   SubCutaneous every 6 hours  Medical Marijuana [place] Oil 2 milliLiter(s) 2 milliLiter(s) Oral <User Schedule>  Medical Marijuana [place]m Oil 2 milliLiter(s) 2 milliLiter(s) Oral <User Schedule>  Medical Marijuana [place] Oil 3 milliLiter(s) 3 milliLiter(s) Oral <User Schedule>  multivitamin/minerals 1 Tablet(s) Oral daily  nystatin Powder 1 Application(s) Topical three times a day  predniSONE  Solution 7.5 milliGRAM(s) Oral daily  QUEtiapine 25 milliGRAM(s) Oral at bedtime  sodium chloride 0.9%. 1000 milliLiter(s) (50 mL/Hr) IV Continuous <Continuous>  tiZANidine 4 milliGRAM(s) Oral <User Schedule>  vancomycin  IVPB 750 milliGRAM(s) IV Intermittent every 12 hours  zinc sulfate 220 milliGRAM(s) Oral daily    MEDICATIONS  (PRN):  acetaminophen    Suspension .. 650 milliGRAM(s) Oral every 6 hours PRN Temp greater or equal to 38C (100.4F)  dextrose 40% Gel 15 Gram(s) Oral once PRN Blood Glucose LESS THAN 70 milliGRAM(s)/deciliter  diphenhydrAMINE   Injectable 25 milliGRAM(s) IntraMuscular every 12 hours PRN Rash and/or Itching  glucagon  Injectable 1 milliGRAM(s) IntraMuscular once PRN Glucose LESS THAN 70 milligrams/deciliter      ---___---___---___---___---___---  REVIEW OF SYSTEM:    unable to obtain     ---___---___---___---___---___---  VITAL SIGNS:  69y , CAPILLARY BLOOD GLUCOSE      POCT Blood Glucose.: 140 mg/dL (16 Jun 2019 05:44)    T(C): 37 (06-16-19 @ 06:02), Max: 37 (06-16-19 @ 06:02)  HR: 86 (06-16-19 @ 06:02) (77 - 92)  BP: 108/68 (06-16-19 @ 06:02) (107/57 - 109/63)  RR: 18 (06-16-19 @ 06:02) (18 - 18)  SpO2: 100% (06-16-19 @ 06:02) (98% - 100%)  ---___---___---___---___---___---  PHYSICAL EXAM:    GEN: A&O X 0 , NAD , comfortable  HEENT: NCAT, PERRL, MMM, hearing intact  Neck: supple , no JVD  CVS: S1S2 , regular , No M/R/G appreciated  PULM: CTA B/L,  no W/R/R appreciated  ABD.: soft. non tender, non distended,  bowel sounds present peg noted   Extrem: intact pulses ,  edema  left leg in brace noted   Derm: No rash ,  sacral decubitus noted stage 4      ---___---___---___---___---___---            LAB AND IMAGING:                          10.2   16.52 )-----------( 332      ( 15 Aryan 2019 10:14 )             33.4               06-16    125<L>  |  93<L>  |  34<H>  ----------------------------<  132<H>  4.3   |  22  |  0.46<L>    Ca    8.7      16 Jun 2019 05:57                                  [All pertinent / recent Imaging reviewed]         ---___---___---___---___---___---___ ---___---___---___---___---                         A S S E S S M E N T   A N D   P L A N :      HEALTH ISSUES - PROBLEM Dx:  stage 4 decubitus ulcer wound care appreciated   Diabetes mellitus: Diabetes mellitus continue riss  History of DVT (deep vein thrombosis): History of DVT (deep vein thrombosis) on eliquis   Asthma: Asthma continue budesonide   Severe sepsis: Severe sepsis on abx awaiting cultures   UTI (urinary tract infection): UTI (urinary tract infection) id on board   Altered mental status: Altered mental status likely acute secondary to sepsis but also has chronic early onset dementia               -GI/DVT Prophylaxis.    --------------------------------------------  Case discussed with   Education given on   ___________________________  Thank you,  Deniz Bolden  [phone]
---___---___---___---___---___---___ ---___---___---___---___---___---___---___---___---                  M E D I C A L   A T T E N D I N G   P R O G R E S S   N O T E  ---___---___---___---___---___---___ ---___---___---___---___---___---___---___---___---        ================================================    ++CHIEF COMPLAINT:   Patient is a 69y old  Female who presents with a chief complaint of fever and lethargy (2019 07:52)    patient stable afebrile  ---___---___---___---___---___---  PAST MEDICAL / Surgical  HISTORY:  PAST MEDICAL & SURGICAL HISTORY:  CVA (cerebral vascular accident)  Fatty pancreas  PNA (pneumonia)  Pulmonary HTN  IGT (impaired glucose tolerance)  Ulcerative colitis  Acid reflux  Anxiety  Depression  Mouth sores  HLD (hyperlipidemia)  Asthma  Humeral head fracture  H/O: hysterectomy  H/O cataract extraction, left  History of knee replacement      ---___---___---___---___---___---  FAMILY HISTORY:   FAMILY HISTORY:  Family history of dementia (Grandparent)  Family history of colon cancer (Grandparent)        ---___---___---___---___---___---  ALLERGIES:   Allergies    ASA; dye contrast (Anaphylaxis)  aspirin (Short breath)  divalproex sodium (Other (Unknown))  Flowers and Plants (Short breath)  Haldol (Other (Unknown))  penicillin (Short breath; Rash)  sulfa drugs (Short breath; Rash)  vancomycin (Rash; Urticaria; Hives)  Xanax (Other (Unknown))    Intolerances        ---___---___---___---___---___---  MEDICATIONS:  MEDICATIONS  (STANDING):  ALBUTerol/ipratropium for Nebulization 3 milliLiter(s) Nebulizer every 12 hours  apixaban 5 milliGRAM(s) Oral every 12 hours  artificial  tears Solution 1 Drop(s) Both EYES three times a day  ascorbic acid 500 milliGRAM(s) Oral daily  buDESOnide   0.5 milliGRAM(s) Respule 0.5 milliGRAM(s) Inhalation two times a day  cefepime   IVPB      cefepime   IVPB 1000 milliGRAM(s) IV Intermittent every 8 hours  chlorhexidine 2% Cloths 1 Application(s) Topical daily  clonazePAM  Tablet 1.5 milliGRAM(s) Oral at bedtime  collagenase Ointment 1 Application(s) Topical daily  collagenase Ointment 1 Application(s) Topical daily  dextrose 5%. 1000 milliLiter(s) (50 mL/Hr) IV Continuous <Continuous>  dextrose 50% Injectable 12.5 Gram(s) IV Push once  dextrose 50% Injectable 25 Gram(s) IV Push once  dextrose 50% Injectable 25 Gram(s) IV Push once  diphenoxylate/atropine 1 Tablet(s) Oral daily  famotidine   Suspension 20 milliGRAM(s) Oral daily  ferrous    sulfate Liquid 300 milliGRAM(s) Enteral Tube daily  gabapentin   Solution 300 milliGRAM(s) Oral two times a day  insulin lispro (HumaLOG) corrective regimen sliding scale   SubCutaneous every 6 hours  Medical Marijuana Awake Oil 2 milliLiter(s) 2 milliLiter(s) Oral <User Schedule>  Medical Marijuana Calm Oil 2 milliLiter(s) 2 milliLiter(s) Oral <User Schedule>  Medical Marijuana [place] Oil 3 milliLiter(s) 3 milliLiter(s) Oral <User Schedule>  multivitamin/minerals 1 Tablet(s) Oral daily  nystatin Powder 1 Application(s) Topical three times a day  predniSONE  Solution 7.5 milliGRAM(s) Oral daily  QUEtiapine 25 milliGRAM(s) Oral at bedtime  sodium chloride 0.9%. 1000 milliLiter(s) (50 mL/Hr) IV Continuous <Continuous>  tiZANidine 4 milliGRAM(s) Oral <User Schedule>  vancomycin  IVPB      zinc sulfate 220 milliGRAM(s) Oral daily    MEDICATIONS  (PRN):  acetaminophen    Suspension .. 650 milliGRAM(s) Oral every 6 hours PRN Temp greater or equal to 38C (100.4F)  dextrose 40% Gel 15 Gram(s) Oral once PRN Blood Glucose LESS THAN 70 milliGRAM(s)/deciliter  diphenhydrAMINE   Injectable 25 milliGRAM(s) IntraMuscular every 12 hours PRN Rash and/or Itching  glucagon  Injectable 1 milliGRAM(s) IntraMuscular once PRN Glucose LESS THAN 70 milligrams/deciliter      ---___---___---___---___---___---  REVIEW OF SYSTEM:    unable to obtain  ---___---___---___---___---___---  VITAL SIGNS:  69y , CAPILLARY BLOOD GLUCOSE      POCT Blood Glucose.: 139 mg/dL (2019 05:49)    T(C): 36.6 (19 @ 05:09), Max: 36.6 (19 @ 13:06)  HR: 87 (19 @ 05:09) (87 - 104)  BP: 149/73 (19 @ 05:09) (117/76 - 153/66)  RR: 17 (19 @ 05:09) (17 - 18)  SpO2: 100% (19 @ 05:09) (100% - 100%)  ---___---___---___---___---___---  PHYSICAL EXAM:    GEN: A&O X 0 , NAD , comfortable  HEENT: NCAT, PERRL, MMM, hearing intact  Neck: supple , no JVD  CVS: S1S2 , regular , No M/R/G appreciated  PULM: CTA B/L,  no W/R/R appreciated  ABD.: soft. non tender, non distended,  bowel sounds present peg noted   Extrem: intact pulses ,  edema  contractures severe to right and left leg   Derm: No rash , no ecchymoses stage 4 sacral decubitus         ---___---___---___---___---___---            LAB AND IMAGIN.3    8.5   )-----------( 303      ( 2019 12:19 )             25.8               -    136  |  97  |  38<H>  ----------------------------<  114<H>  4.3   |  25  |  0.37<L>    Ca    9.2      2019 06:58                                  [All pertinent / recent Imaging reviewed]         ---___---___---___---___---___---___ ---___---___---___---___---                         A S S E S S M E N T   A N D   P L A N :      HEALTH ISSUES - PROBLEM Dx:  uti  finish course of abx  dc home after last dose of abx tomorrow id agrees  metabolic encephalopathy  has improved   sepsis as above   stage 4 sacral decubitus  continue wound care at home for sacral decubitus after dc home   functional quadriplegia  supportive care   agitation / continue currnt regimen   chronic pain  on medical  marijuana for pain   asthma on budesonide  -GI/DVT Prophylaxis. on eliquis       her long tern outlook remains poor      --------------------------------------------  Case discussed with    Education given on   ___________________________  Thank you,  Deniz Bolden  [phone]
---___---___---___---___---___---___ ---___---___---___---___---___---___---___---___---                  M E D I C A L   A T T E N D I N G   P R O G R E S S   N O T E  ---___---___---___---___---___---___ ---___---___---___---___---___---___---___---___---        ================================================    ++CHIEF COMPLAINT:   Patient is a 69y old  Female who presents with a chief complaint of fever and lethargy (2019 22:06)      Urinary tract infection admitted for fever and uti.     ---___---___---___---___---___---  PAST MEDICAL / Surgical  HISTORY:  PAST MEDICAL & SURGICAL HISTORY:  CVA (cerebral vascular accident)  Fatty pancreas  PNA (pneumonia)  Pulmonary HTN  IGT (impaired glucose tolerance)  Ulcerative colitis  Acid reflux  Anxiety  Depression  Mouth sores  HLD (hyperlipidemia)  Asthma  Humeral head fracture  H/O: hysterectomy  H/O cataract extraction, left  History of knee replacement      ---___---___---___---___---___---  FAMILY HISTORY:   FAMILY HISTORY:  Family history of dementia (Grandparent)  Family history of colon cancer (Grandparent)        ---___---___---___---___---___---  ALLERGIES:   Allergies    ASA; dye contrast (Anaphylaxis)  aspirin (Short breath)  divalproex sodium (Other (Unknown))  Flowers and Plants (Short breath)  Haldol (Other (Unknown))  penicillin (Short breath; Rash)  sulfa drugs (Short breath; Rash)  vancomycin (Rash; Urticaria; Hives)  Xanax (Other (Unknown))    Intolerances        ---___---___---___---___---___---  MEDICATIONS:  MEDICATIONS  (STANDING):  acetaminophen  IVPB .. 650 milliGRAM(s) IV Intermittent once  ALBUTerol    90 MICROgram(s) HFA Inhaler 1 Puff(s) Inhalation every 4 hours  ALBUTerol/ipratropium for Nebulization 3 milliLiter(s) Nebulizer every 12 hours  apixaban 5 milliGRAM(s) Oral every 12 hours  artificial  tears Solution 1 Drop(s) Both EYES three times a day  buDESOnide   0.5 milliGRAM(s) Respule 0.5 milliGRAM(s) Inhalation two times a day  cefepime   IVPB 1000 milliGRAM(s) IV Intermittent every 12 hours  clonazePAM  Tablet 1.5 milliGRAM(s) Oral at bedtime  collagenase Ointment 1 Application(s) Topical daily  dextrose 5%. 1000 milliLiter(s) (50 mL/Hr) IV Continuous <Continuous>  dextrose 50% Injectable 12.5 Gram(s) IV Push once  dextrose 50% Injectable 25 Gram(s) IV Push once  dextrose 50% Injectable 25 Gram(s) IV Push once  diphenoxylate/atropine 1 Tablet(s) Oral daily  famotidine   Suspension 20 milliGRAM(s) Oral daily  ferrous    sulfate Liquid 300 milliGRAM(s) Enteral Tube daily  gabapentin   Solution 300 milliGRAM(s) Oral two times a day  insulin lispro (HumaLOG) corrective regimen sliding scale   SubCutaneous every 6 hours  multivitamin/minerals 1 Tablet(s) Oral daily  predniSONE  Solution 7.5 milliGRAM(s) Oral daily  QUEtiapine 25 milliGRAM(s) Oral at bedtime  tiZANidine 4 milliGRAM(s) Oral <User Schedule>  vancomycin  IVPB 750 milliGRAM(s) IV Intermittent every 12 hours    MEDICATIONS  (PRN):  dextrose 40% Gel 15 Gram(s) Oral once PRN Blood Glucose LESS THAN 70 milliGRAM(s)/deciliter  diphenhydrAMINE   Injectable 25 milliGRAM(s) IntraMuscular every 12 hours PRN Rash and/or Itching  glucagon  Injectable 1 milliGRAM(s) IntraMuscular once PRN Glucose LESS THAN 70 milligrams/deciliter      ---___---___---___---___---___---  REVIEW OF SYSTEM:         ---___---___---___---___---___---  VITAL SIGNS:  69y , CAPILLARY BLOOD GLUCOSE      POCT Blood Glucose.: 117 mg/dL (2019 06:06)    T(C): 39.5 (19 @ 06:20), Max: 39.5 (19 @ 06:20)  HR: 106 (19 @ 06:20) (82 - 106)  BP: 104/50 (19 @ 06:50) (98/52 - 148/75)  RR: 18 (19 @ 06:20) (16 - 18)  SpO2: 95% (19 @ 06:20) (95% - 100%)  ---___---___---___---___---___---  PHYSICAL EXAM:    GEN: A&O X 3 , NAD , comfortable  HEENT: NCAT, PERRL, MMM, hearing intact  Neck: supple , no JVD  CVS: S1S2 , regular , No M/R/G appreciated  PULM: CTA B/L,  no W/R/R appreciated  ABD.: soft. non tender, non distended,  bowel sounds present  Extrem: intact pulses , no edema   Derm: stage 4 decubitus ulcer and erythema noted to the skin .         ---___---___---___---___---___---            LAB AND IMAGING:                          10.7   16.0  )-----------( 324      ( 2019 14:30 )             32.3               06-13    130<L>  |  89<L>  |  27<H>  ----------------------------<  195<H>  4.9   |  27  |  0.36<L>    Ca    9.3      2019 14:30    TPro  6.4  /  Alb  3.4  /  TBili  0.4  /  DBili  x   /  AST  16  /  ALT  9<L>  /  AlkPhos  94  06-13    PT/INR - ( 2019 14:30 )   PT: 16.9 sec;   INR: 1.47 ratio         PTT - ( 2019 14:30 )  PTT:37.4 sec                        Urinalysis Basic - ( 2019 15:54 )    Color: Yellow / Appearance: Slightly Turbid / S.015 / pH: x  Gluc: x / Ketone: Negative  / Bili: Negative / Urobili: Negative   Blood: x / Protein: Trace / Nitrite: Negative   Leuk Esterase: Large / RBC: 5 /hpf /  /HPF   Sq Epi: x / Non Sq Epi: 1 /hpf / Bacteria: Moderate        [All pertinent / recent Imaging reviewed]         ---___---___---___---___---___---___ ---___---___---___---___---                         A S S E S S M E N T   A N D   P L A N :      HEALTH ISSUES - PROBLEM Dx:  Uti continue vanco  on cefepime but having erythema consider dc and changing to another abx   fever iv tylenol   hypotension if still low despite iv fluids start low dose midodrine short term   chronic pain left leg fracture repeat xray of the left leg and left femur continue neurontin and medical marijuana   asthma on budesonide and albuterol ipratroprium   GI/DVT Prophylaxis. eliqiuis 5 mg po bid .  seroquel and klonopin fo insomnia at night       --------------------------------------------  Case discussed with   Education given on   ___________________________  Thank you,  Deniz Bolden  [phone]
69yPatient is a 69y old  Female who presents with a chief complaint of fever and lethargy (16 Jun 2019 07:33)      Interval history:  ROS unable  Pt in bed, contracted.  Last fever, yesterday morning.  noted to have elevated vanco level at 29.  Blood cultures positive for GPC clusters.        Antimicrobials:         cefepime   IVPB 1000 milliGRAM(s) IV Intermittent every 8 hours  vancomycin  IVPB 750 milliGRAM(s) IV Intermittent every 12 hours    MEDICATIONS  (STANDING):  acetaminophen    Suspension .. 650 every 6 hours PRN  ALBUTerol/ipratropium for Nebulization 3 every 12 hours  apixaban 5 every 12 hours  buDESOnide   0.5 milliGRAM(s) Respule 0.5 two times a day  clonazePAM  Tablet 1.5 at bedtime  dextrose 40% Gel 15 once PRN  dextrose 50% Injectable 12.5 once  dextrose 50% Injectable 25 once  dextrose 50% Injectable 25 once  diphenhydrAMINE   Injectable 25 every 12 hours PRN  diphenoxylate/atropine 1 daily  famotidine   Suspension 20 daily  gabapentin   Solution 300 two times a day  glucagon  Injectable 1 once PRN  insulin lispro (HumaLOG) corrective regimen sliding scale  every 6 hours  predniSONE  Solution 7.5 daily  QUEtiapine 25 at bedtime  tiZANidine 4 <User Schedule>      Vital Signs Last 24 Hrs  T(C): 37 (06-16-19 @ 06:02), Max: 37 (06-16-19 @ 06:02)  T(F): 98.6 (06-16-19 @ 06:02), Max: 98.6 (06-16-19 @ 06:02)  HR: 86 (06-16-19 @ 06:02) (77 - 92)  BP: 108/68 (06-16-19 @ 06:02) (107/57 - 109/63)  BP(mean): --  RR: 18 (06-16-19 @ 06:02) (18 - 18)  SpO2: 100% (06-16-19 @ 06:02) (98% - 100%)    PE:  Gen: contracted, cachexia   Eyes: No conjunctival injection  ENMT: No sinus tenderness. No thrush.  Neck: Supple,No LN,no JVD  Respiratory: Equal AE b/l  Gastrointestinal: Soft BS(+) no tenderness no masses ,No rebound or guarding, +PEG  Genitourinary: + Blackman  Extremities contracted  redness on right leg, P IV site okay.                         10.2   16.52 )-----------( 332      ( 15 Aryan 2019 10:14 )             33.4   06-16    125<L>  |  93<L>  |  34<H>  ----------------------------<  132<H>  4.3   |  22  |  0.46<L>    Ca    8.7      16 Jun 2019 05:57    WBC Count: 19.37 K/uL (06.14.19 @ 09:27)      RECENT CULTURES:    Rapid Respiratory Viral Panel (06.14.19 @ 07:39)    Rapid RVP Result: NotDetec:    Culture - Urine (06.13.19 @ 21:28)    -  Amikacin: S <=16    -  Amikacin: I 32    -  Aztreonam: S <=4    -  Aztreonam: R >16    -  Ertapenem: S <=1    -  Ciprofloxacin: S <=1    -  Ciprofloxacin: R >2    -  Cefepime: S <=4    -  Cefepime: S 8    -  Piperacillin/Tazobactam: S <=16    -  Piperacillin/Tazobactam: R >64    -  Tigecycline: I 4    -  Trimethoprim/Sulfamethoxazole: S <=2/38    -  Gentamicin: R >8    -  Gentamicin: S <=4    -  Imipenem: S <=1    -  Imipenem: R >8    -  Tobramycin: S <=4    -  Tobramycin: R >8    -  Ampicillin/Sulbactam: S <=8/4    -  Ceftriaxone: S <=1 Enterobacter, Citrobacter, and Serratia may develop resistance during prolonged therapy    -  Levofloxacin: S <=2    -  Meropenem: S <=1    -  Nitrofurantoin: R >64 Should not be used to treat pyelonephritis    -  Levofloxacin: R >4    -  Meropenem: R >8    -  Ampicillin: I 16 These ampicillin results predict results for amoxicillin    -  Cefazolin: S <=8    -  Cefoxitin: S <=8    -  Ceftazidime: R >16    Specimen Source: .Urine    Culture Results:   >100,000 CFU/ml Klebsiella oxytoca  >100,000 CFU/ml Pseudomonas aeruginosa (Carbapenem Resistant)    Organism Identification: Klebsiella oxytoca  Pseudomonas aeruginosa (Carbapenem Resistant)    Organism: Klebsiella oxytoca    Organism: Pseudomonas aeruginosa (Carbapenem Resistant)    Method Type: BLANCA    Method Type: BLANCA    Culture - Blood (06.13.19 @ 18:25)    -  Coagulase negative Staphylococcus: Detec    Gram Stain:   Growth in anaerobic bottle: Gram Positive Cocci in Clusters  Growth in aerobic bottle: Gram Positive Cocci in Clusters    Specimen Source: .Blood    Organism: Blood Culture PCR    Culture Results:   Growth in aerobic and anaerobic bottles: Coag Negative Staphylococcus    Culture - Blood (06.13.19 @ 18:25)    Gram Stain:   Growth in anaerobic bottle: Gram Positive Cocci in Clusters    Specimen Source: .Blood    Culture Results:   Growth in anaerobic bottle: Gram Positive Cocci in Clusters      Radiology:  < from: Xray Chest 1 View AP/PA (06.13.19 @ 14:34) >  Impression: Bilateral linear atelectasis.    < end of copied text >
infectious diseases progress note:    Patient is a 69y old  Female who presents with a chief complaint of fever and lethargy (16 Jun 2019 09:44)        Urinary tract infection             Allergies    ASA; dye contrast (Anaphylaxis)  aspirin (Short breath)  divalproex sodium (Other (Unknown))  Flowers and Plants (Short breath)  Haldol (Other (Unknown))  penicillin (Short breath; Rash)  sulfa drugs (Short breath; Rash)  vancomycin (Rash; Urticaria; Hives)  Xanax (Other (Unknown))    Intolerances        ANTIBIOTICS/RELEVANT:  antimicrobials  cefepime   IVPB      cefepime   IVPB 1000 milliGRAM(s) IV Intermittent every 8 hours    immunologic:    OTHER:  acetaminophen    Suspension .. 650 milliGRAM(s) Oral every 6 hours PRN  ALBUTerol/ipratropium for Nebulization 3 milliLiter(s) Nebulizer every 12 hours  apixaban 5 milliGRAM(s) Oral every 12 hours  artificial  tears Solution 1 Drop(s) Both EYES three times a day  ascorbic acid 500 milliGRAM(s) Oral daily  buDESOnide   0.5 milliGRAM(s) Respule 0.5 milliGRAM(s) Inhalation two times a day  chlorhexidine 2% Cloths 1 Application(s) Topical daily  clonazePAM  Tablet 1.5 milliGRAM(s) Oral at bedtime  collagenase Ointment 1 Application(s) Topical daily  collagenase Ointment 1 Application(s) Topical daily  dextrose 40% Gel 15 Gram(s) Oral once PRN  dextrose 5%. 1000 milliLiter(s) IV Continuous <Continuous>  dextrose 50% Injectable 12.5 Gram(s) IV Push once  dextrose 50% Injectable 25 Gram(s) IV Push once  dextrose 50% Injectable 25 Gram(s) IV Push once  diphenhydrAMINE   Injectable 25 milliGRAM(s) IntraMuscular every 12 hours PRN  diphenoxylate/atropine 1 Tablet(s) Oral daily  famotidine   Suspension 20 milliGRAM(s) Oral daily  ferrous    sulfate Liquid 300 milliGRAM(s) Enteral Tube daily  gabapentin   Solution 300 milliGRAM(s) Oral two times a day  glucagon  Injectable 1 milliGRAM(s) IntraMuscular once PRN  insulin lispro (HumaLOG) corrective regimen sliding scale   SubCutaneous every 6 hours  Medical Marijuana Awake Oil 2 milliLiter(s) 2 milliLiter(s) Oral <User Schedule>  Medical Marijuana Calm Oil 2 milliLiter(s) 2 milliLiter(s) Oral <User Schedule>  Medical Marijuana [place] Oil 3 milliLiter(s) 3 milliLiter(s) Oral <User Schedule>  multivitamin/minerals 1 Tablet(s) Oral daily  nystatin Powder 1 Application(s) Topical three times a day  predniSONE  Solution 7.5 milliGRAM(s) Oral daily  QUEtiapine 25 milliGRAM(s) Oral at bedtime  sodium chloride 0.9%. 1000 milliLiter(s) IV Continuous <Continuous>  tiZANidine 4 milliGRAM(s) Oral <User Schedule>  zinc sulfate 220 milliGRAM(s) Oral daily      Objective:  Vital Signs Last 24 Hrs  T(C): 36.6 (17 Jun 2019 06:38), Max: 36.6 (17 Jun 2019 06:38)  T(F): 97.8 (17 Jun 2019 06:38), Max: 97.8 (17 Jun 2019 06:38)  HR: 88 (17 Jun 2019 06:38) (83 - 95)  BP: 134/67 (17 Jun 2019 06:38) (95/59 - 134/67)  BP(mean): --  RR: 18 (17 Jun 2019 06:38) (18 - 18)  SpO2: 100% (17 Jun 2019 06:38) (100% - 100%)    PHYSICAL EXAM:    Eyes:JACQUELIN, EOMI  Ear/Nose/Throat: no oral lesion, no sinus tenderness on percussion	  Neck:no JVD, no lymphadenopathy, supple  Respiratory: CTA maryjane  Cardiovascular: S1S2 RRR, no murmurs  Gastrointestinal:soft, (+) BS, no HSM   contracted  no rash         LABS:                        10.2   16.52 )-----------( 332      ( 15 Aryan 2019 10:14 )             33.4     06-17    136  |  97  |  38<H>  ----------------------------<  114<H>  4.3   |  25  |  0.37<L>    Ca    9.2      17 Jun 2019 06:58              MICROBIOLOGY:    RECENT CULTURES:  06-13 @ 21:28 .Urine   BLANCA      Klebsiella oxytoca  Pseudomonas aeruginosa (Carbapenem Resistant)  Klebsiella oxytoca     >100,000 CFU/ml Klebsiella oxytoca  >100,000 CFU/ml Pseudomonas aeruginosa (Carbapenem Resistant)    06-13 @ 18:25 .Blood   PCR    Growth in anaerobic bottle: Gram Positive Cocci in Clusters    Blood Culture PCR  Blood Culture PCR     Growth in anaerobic bottle: Staphylococcus epidermidis  Susceptibility to follow.          RESPIRATORY CULTURES:              RADIOLOGY & ADDITIONAL STUDIES:        Pager [phone]  After 5 pm/weekends or if no response :[phone]
infectious diseases progress note:    Patient is a 69y old  Female who presents with a chief complaint of fever and lethargy (18 Jun 2019 08:45)        Urinary tract infection             Allergies    ASA; dye contrast (Anaphylaxis)  aspirin (Short breath)  divalproex sodium (Other (Unknown))  Flowers and Plants (Short breath)  Haldol (Other (Unknown))  penicillin (Short breath; Rash)  sulfa drugs (Short breath; Rash)  vancomycin (Rash; Urticaria; Hives)  Xanax (Other (Unknown))    Intolerances        ANTIBIOTICS/RELEVANT:  antimicrobials  cefepime   IVPB      cefepime   IVPB 1000 milliGRAM(s) IV Intermittent every 8 hours  vancomycin  IVPB      vancomycin  IVPB 1000 milliGRAM(s) IV Intermittent every 24 hours    immunologic:    OTHER:  acetaminophen    Suspension .. 650 milliGRAM(s) Oral every 6 hours PRN  ALBUTerol/ipratropium for Nebulization 3 milliLiter(s) Nebulizer every 12 hours  apixaban 5 milliGRAM(s) Oral every 12 hours  artificial  tears Solution 1 Drop(s) Both EYES three times a day  ascorbic acid 500 milliGRAM(s) Oral daily  buDESOnide   0.5 milliGRAM(s) Respule 0.5 milliGRAM(s) Inhalation two times a day  chlorhexidine 2% Cloths 1 Application(s) Topical daily  clonazePAM  Tablet 1.5 milliGRAM(s) Oral at bedtime  collagenase Ointment 1 Application(s) Topical daily  collagenase Ointment 1 Application(s) Topical daily  dextrose 40% Gel 15 Gram(s) Oral once PRN  dextrose 5%. 1000 milliLiter(s) IV Continuous <Continuous>  dextrose 50% Injectable 12.5 Gram(s) IV Push once  dextrose 50% Injectable 25 Gram(s) IV Push once  dextrose 50% Injectable 25 Gram(s) IV Push once  diphenhydrAMINE   Injectable 25 milliGRAM(s) IntraMuscular every 12 hours PRN  diphenoxylate/atropine 1 Tablet(s) Oral daily  famotidine   Suspension 20 milliGRAM(s) Oral daily  ferrous    sulfate Liquid 300 milliGRAM(s) Enteral Tube daily  gabapentin   Solution 300 milliGRAM(s) Oral two times a day  glucagon  Injectable 1 milliGRAM(s) IntraMuscular once PRN  insulin lispro (HumaLOG) corrective regimen sliding scale   SubCutaneous every 6 hours  Medical Marijuana Awake Oil 2 milliLiter(s) 2 milliLiter(s) Oral <User Schedule>  Medical Marijuana Calm Oil 2 milliLiter(s) 2 milliLiter(s) Oral <User Schedule>  Medical Marijuana [place] Oil 3 milliLiter(s) 3 milliLiter(s) Oral <User Schedule>  multivitamin/minerals 1 Tablet(s) Oral daily  nystatin Powder 1 Application(s) Topical three times a day  predniSONE  Solution 7.5 milliGRAM(s) Oral daily  QUEtiapine 25 milliGRAM(s) Oral at bedtime  sodium chloride 0.9%. 1000 milliLiter(s) IV Continuous <Continuous>  tiZANidine 4 milliGRAM(s) Oral <User Schedule>  zinc sulfate 220 milliGRAM(s) Oral daily      Objective:  Vital Signs Last 24 Hrs  T(C): 36.6 (19 Jun 2019 06:00), Max: 36.7 (19 Jun 2019 00:26)  T(F): 97.9 (19 Jun 2019 06:00), Max: 98 (19 Jun 2019 00:26)  HR: 89 (19 Jun 2019 06:00) (89 - 105)  BP: 145/83 (19 Jun 2019 06:00) (122/68 - 145/83)  BP(mean): --  RR: 18 (19 Jun 2019 06:00) (18 - 18)  SpO2: 99% (19 Jun 2019 06:00) (98% - 100%)     E            LABS:                        7.9    9.75  )-----------( 310      ( 18 Jun 2019 13:58 )             25.9     06-18    135  |  95<L>  |  38<H>  ----------------------------<  216<H>  4.3   |  26  |  0.40<L>    Ca    9.5      18 Jun 2019 11:29    TPro  6.0  /  Alb  3.2<L>  /  TBili  0.2  /  DBili  x   /  AST  11  /  ALT  13  /  AlkPhos  87  06-18            MICROBIOLOGY:    RECENT CULTURES:  06-17 @ 13:13 .Blood                No growth to date.    06-13 @ 21:28 .Urine   BLANCA      Klebsiella oxytoca  Pseudomonas aeruginosa (Carbapenem Resistant)  Klebsiella oxytoca     >100,000 CFU/ml Klebsiella oxytoca  >100,000 CFU/ml Pseudomonas aeruginosa (Carbapenem Resistant)    06-13 @ 18:25 .Blood   BLANCA    Growth in anaerobic bottle: Gram Positive Cocci in Clusters    Staphylococcus epidermidis  Staphylococcus epidermidis     Growth in anaerobic bottle: Staphylococcus epidermidis          RESPIRATORY CULTURES:              RADIOLOGY & ADDITIONAL STUDIES:        Pager [phone]  After 5 pm/weekends or if no response :[phone]

## 2019-06-24 ENCOUNTER — INPATIENT (INPATIENT)
Facility: HOSPITAL | Age: 69
LOS: 45 days | DRG: 853 | End: 2019-08-09
Attending: FAMILY MEDICINE | Admitting: FAMILY MEDICINE
Payer: MEDICARE

## 2019-06-24 VITALS
DIASTOLIC BLOOD PRESSURE: 63 MMHG | HEIGHT: 56 IN | HEART RATE: 114 BPM | RESPIRATION RATE: 18 BRPM | WEIGHT: 85.1 LBS | SYSTOLIC BLOOD PRESSURE: 119 MMHG | OXYGEN SATURATION: 92 %

## 2019-06-24 DIAGNOSIS — L89.159 PRESSURE ULCER OF SACRAL REGION, UNSPECIFIED STAGE: ICD-10-CM

## 2019-06-24 DIAGNOSIS — A41.9 SEPSIS, UNSPECIFIED ORGANISM: ICD-10-CM

## 2019-06-24 DIAGNOSIS — Z93.1 GASTROSTOMY STATUS: Chronic | ICD-10-CM

## 2019-06-24 DIAGNOSIS — E11.9 TYPE 2 DIABETES MELLITUS WITHOUT COMPLICATIONS: ICD-10-CM

## 2019-06-24 DIAGNOSIS — K94.23 GASTROSTOMY MALFUNCTION: ICD-10-CM

## 2019-06-24 DIAGNOSIS — I82.501 CHRONIC EMBOLISM AND THROMBOSIS OF UNSPECIFIED DEEP VEINS OF RIGHT LOWER EXTREMITY: ICD-10-CM

## 2019-06-24 DIAGNOSIS — J45.909 UNSPECIFIED ASTHMA, UNCOMPLICATED: ICD-10-CM

## 2019-06-24 DIAGNOSIS — F03.90 UNSPECIFIED DEMENTIA WITHOUT BEHAVIORAL DISTURBANCE: ICD-10-CM

## 2019-06-24 DIAGNOSIS — S42.293A OTHER DISPLACED FRACTURE OF UPPER END OF UNSPECIFIED HUMERUS, INITIAL ENCOUNTER FOR CLOSED FRACTURE: Chronic | ICD-10-CM

## 2019-06-24 DIAGNOSIS — Z98.42 CATARACT EXTRACTION STATUS, LEFT EYE: Chronic | ICD-10-CM

## 2019-06-24 DIAGNOSIS — Z96.659 PRESENCE OF UNSPECIFIED ARTIFICIAL KNEE JOINT: Chronic | ICD-10-CM

## 2019-06-24 DIAGNOSIS — Z90.710 ACQUIRED ABSENCE OF BOTH CERVIX AND UTERUS: Chronic | ICD-10-CM

## 2019-06-24 DIAGNOSIS — Z29.9 ENCOUNTER FOR PROPHYLACTIC MEASURES, UNSPECIFIED: ICD-10-CM

## 2019-06-24 LAB
ALBUMIN SERPL ELPH-MCNC: 3.2 G/DL — LOW (ref 3.3–5)
ALP SERPL-CCNC: 92 U/L — SIGNIFICANT CHANGE UP (ref 40–120)
ALT FLD-CCNC: 7 U/L — LOW (ref 10–45)
ANION GAP SERPL CALC-SCNC: 15 MMOL/L — SIGNIFICANT CHANGE UP (ref 5–17)
APTT BLD: 30.5 SEC — SIGNIFICANT CHANGE UP (ref 27.5–36.3)
AST SERPL-CCNC: 9 U/L — LOW (ref 10–40)
BASOPHILS # BLD AUTO: 0.1 K/UL — SIGNIFICANT CHANGE UP (ref 0–0.2)
BASOPHILS NFR BLD AUTO: 0.7 % — SIGNIFICANT CHANGE UP (ref 0–2)
BILIRUB SERPL-MCNC: 0.3 MG/DL — SIGNIFICANT CHANGE UP (ref 0.2–1.2)
BUN SERPL-MCNC: 34 MG/DL — HIGH (ref 7–23)
CALCIUM SERPL-MCNC: 9.7 MG/DL — SIGNIFICANT CHANGE UP (ref 8.4–10.5)
CHLORIDE SERPL-SCNC: 93 MMOL/L — LOW (ref 96–108)
CO2 SERPL-SCNC: 28 MMOL/L — SIGNIFICANT CHANGE UP (ref 22–31)
CREAT SERPL-MCNC: 0.42 MG/DL — LOW (ref 0.5–1.3)
EOSINOPHIL # BLD AUTO: 1.2 K/UL — HIGH (ref 0–0.5)
EOSINOPHIL NFR BLD AUTO: 7.3 % — HIGH (ref 0–6)
GLUCOSE BLDC GLUCOMTR-MCNC: 166 MG/DL — HIGH (ref 70–99)
GLUCOSE BLDC GLUCOMTR-MCNC: 168 MG/DL — HIGH (ref 70–99)
GLUCOSE SERPL-MCNC: 158 MG/DL — HIGH (ref 70–99)
HCT VFR BLD CALC: 29.4 % — LOW (ref 34.5–45)
HGB BLD-MCNC: 9.9 G/DL — LOW (ref 11.5–15.5)
INR BLD: 1.39 RATIO — HIGH (ref 0.88–1.16)
LYMPHOCYTES # BLD AUTO: 16.5 % — SIGNIFICANT CHANGE UP (ref 13–44)
LYMPHOCYTES # BLD AUTO: 2.7 K/UL — SIGNIFICANT CHANGE UP (ref 1–3.3)
MCHC RBC-ENTMCNC: 30.8 PG — SIGNIFICANT CHANGE UP (ref 27–34)
MCHC RBC-ENTMCNC: 33.7 GM/DL — SIGNIFICANT CHANGE UP (ref 32–36)
MCV RBC AUTO: 91.4 FL — SIGNIFICANT CHANGE UP (ref 80–100)
MONOCYTES # BLD AUTO: 1.7 K/UL — HIGH (ref 0–0.9)
MONOCYTES NFR BLD AUTO: 10.4 % — SIGNIFICANT CHANGE UP (ref 2–14)
NEUTROPHILS # BLD AUTO: 10.8 K/UL — HIGH (ref 1.8–7.4)
NEUTROPHILS NFR BLD AUTO: 65.1 % — SIGNIFICANT CHANGE UP (ref 43–77)
PLAT MORPH BLD: NORMAL — SIGNIFICANT CHANGE UP
PLATELET # BLD AUTO: 692 K/UL — HIGH (ref 150–400)
POTASSIUM SERPL-MCNC: 4 MMOL/L — SIGNIFICANT CHANGE UP (ref 3.5–5.3)
POTASSIUM SERPL-SCNC: 4 MMOL/L — SIGNIFICANT CHANGE UP (ref 3.5–5.3)
PROT SERPL-MCNC: 6.6 G/DL — SIGNIFICANT CHANGE UP (ref 6–8.3)
PROTHROM AB SERPL-ACNC: 16.2 SEC — HIGH (ref 10–12.9)
RBC # BLD: 3.22 M/UL — LOW (ref 3.8–5.2)
RBC # FLD: 15.4 % — HIGH (ref 10.3–14.5)
RBC BLD AUTO: SIGNIFICANT CHANGE UP
SODIUM SERPL-SCNC: 136 MMOL/L — SIGNIFICANT CHANGE UP (ref 135–145)
WBC # BLD: 16.6 K/UL — HIGH (ref 3.8–10.5)
WBC # FLD AUTO: 16.6 K/UL — HIGH (ref 3.8–10.5)

## 2019-06-24 PROCEDURE — 99223 1ST HOSP IP/OBS HIGH 75: CPT

## 2019-06-24 PROCEDURE — 74176 CT ABD & PELVIS W/O CONTRAST: CPT | Mod: 26

## 2019-06-24 PROCEDURE — 99285 EMERGENCY DEPT VISIT HI MDM: CPT | Mod: GC

## 2019-06-24 PROCEDURE — 99232 SBSQ HOSP IP/OBS MODERATE 35: CPT

## 2019-06-24 PROCEDURE — 71045 X-RAY EXAM CHEST 1 VIEW: CPT | Mod: 26

## 2019-06-24 RX ORDER — DIPHENHYDRAMINE HCL 50 MG
25 CAPSULE ORAL ONCE
Refills: 0 | Status: COMPLETED | OUTPATIENT
Start: 2019-06-24 | End: 2019-06-24

## 2019-06-24 RX ORDER — DEXTROSE 50 % IN WATER 50 %
15 SYRINGE (ML) INTRAVENOUS ONCE
Refills: 0 | Status: DISCONTINUED | OUTPATIENT
Start: 2019-06-24 | End: 2019-07-14

## 2019-06-24 RX ORDER — SODIUM CHLORIDE 9 MG/ML
1000 INJECTION INTRAMUSCULAR; INTRAVENOUS; SUBCUTANEOUS ONCE
Refills: 0 | Status: COMPLETED | OUTPATIENT
Start: 2019-06-24 | End: 2019-06-24

## 2019-06-24 RX ORDER — METRONIDAZOLE 500 MG
500 TABLET ORAL EVERY 8 HOURS
Refills: 0 | Status: DISCONTINUED | OUTPATIENT
Start: 2019-06-24 | End: 2019-06-27

## 2019-06-24 RX ORDER — GLUCAGON INJECTION, SOLUTION 0.5 MG/.1ML
1 INJECTION, SOLUTION SUBCUTANEOUS ONCE
Refills: 0 | Status: DISCONTINUED | OUTPATIENT
Start: 2019-06-24 | End: 2019-08-07

## 2019-06-24 RX ORDER — SODIUM HYPOCHLORITE 0.125 %
1 SOLUTION, NON-ORAL MISCELLANEOUS
Refills: 0 | Status: DISCONTINUED | OUTPATIENT
Start: 2019-06-24 | End: 2019-06-26

## 2019-06-24 RX ORDER — DEXTROSE 50 % IN WATER 50 %
25 SYRINGE (ML) INTRAVENOUS ONCE
Refills: 0 | Status: DISCONTINUED | OUTPATIENT
Start: 2019-06-24 | End: 2019-07-14

## 2019-06-24 RX ORDER — VANCOMYCIN HCL 1 G
1000 VIAL (EA) INTRAVENOUS ONCE
Refills: 0 | Status: DISCONTINUED | OUTPATIENT
Start: 2019-06-24 | End: 2019-06-24

## 2019-06-24 RX ORDER — COLLAGENASE CLOSTRIDIUM HIST. 250 UNIT/G
1 OINTMENT (GRAM) TOPICAL DAILY
Refills: 0 | Status: DISCONTINUED | OUTPATIENT
Start: 2019-06-24 | End: 2019-06-26

## 2019-06-24 RX ORDER — METRONIDAZOLE 500 MG
500 TABLET ORAL ONCE
Refills: 0 | Status: COMPLETED | OUTPATIENT
Start: 2019-06-24 | End: 2019-06-24

## 2019-06-24 RX ORDER — INSULIN LISPRO 100/ML
VIAL (ML) SUBCUTANEOUS EVERY 6 HOURS
Refills: 0 | Status: DISCONTINUED | OUTPATIENT
Start: 2019-06-24 | End: 2019-08-07

## 2019-06-24 RX ORDER — NYSTATIN CREAM 100000 [USP'U]/G
1 CREAM TOPICAL THREE TIMES A DAY
Refills: 0 | Status: DISCONTINUED | OUTPATIENT
Start: 2019-06-24 | End: 2019-08-09

## 2019-06-24 RX ORDER — ACETAMINOPHEN 500 MG
650 TABLET ORAL ONCE
Refills: 0 | Status: COMPLETED | OUTPATIENT
Start: 2019-06-24 | End: 2019-06-24

## 2019-06-24 RX ORDER — ACETAMINOPHEN 500 MG
650 TABLET ORAL EVERY 6 HOURS
Refills: 0 | Status: DISCONTINUED | OUTPATIENT
Start: 2019-06-24 | End: 2019-08-09

## 2019-06-24 RX ORDER — SODIUM CHLORIDE 9 MG/ML
1000 INJECTION, SOLUTION INTRAVENOUS
Refills: 0 | Status: DISCONTINUED | OUTPATIENT
Start: 2019-06-24 | End: 2019-06-26

## 2019-06-24 RX ORDER — CEFEPIME 1 G/1
1000 INJECTION, POWDER, FOR SOLUTION INTRAMUSCULAR; INTRAVENOUS ONCE
Refills: 0 | Status: COMPLETED | OUTPATIENT
Start: 2019-06-24 | End: 2019-06-24

## 2019-06-24 RX ORDER — BUDESONIDE AND FORMOTEROL FUMARATE DIHYDRATE 160; 4.5 UG/1; UG/1
2 AEROSOL RESPIRATORY (INHALATION)
Refills: 0 | Status: DISCONTINUED | OUTPATIENT
Start: 2019-06-24 | End: 2019-07-13

## 2019-06-24 RX ORDER — CEFEPIME 1 G/1
1000 INJECTION, POWDER, FOR SOLUTION INTRAMUSCULAR; INTRAVENOUS EVERY 8 HOURS
Refills: 0 | Status: DISCONTINUED | OUTPATIENT
Start: 2019-06-24 | End: 2019-07-07

## 2019-06-24 RX ORDER — DEXTROSE 50 % IN WATER 50 %
12.5 SYRINGE (ML) INTRAVENOUS ONCE
Refills: 0 | Status: DISCONTINUED | OUTPATIENT
Start: 2019-06-24 | End: 2019-07-14

## 2019-06-24 RX ORDER — DIPHENHYDRAMINE HCL 50 MG
25 CAPSULE ORAL ONCE
Refills: 0 | Status: DISCONTINUED | OUTPATIENT
Start: 2019-06-24 | End: 2019-06-24

## 2019-06-24 RX ORDER — TIOTROPIUM BROMIDE 18 UG/1
1 CAPSULE ORAL; RESPIRATORY (INHALATION) DAILY
Refills: 0 | Status: DISCONTINUED | OUTPATIENT
Start: 2019-06-24 | End: 2019-07-09

## 2019-06-24 RX ORDER — FAMOTIDINE 10 MG/ML
20 INJECTION INTRAVENOUS DAILY
Refills: 0 | Status: DISCONTINUED | OUTPATIENT
Start: 2019-06-24 | End: 2019-07-07

## 2019-06-24 RX ORDER — ALBUTEROL 90 UG/1
1 AEROSOL, METERED ORAL EVERY 4 HOURS
Refills: 0 | Status: DISCONTINUED | OUTPATIENT
Start: 2019-06-24 | End: 2019-07-03

## 2019-06-24 RX ORDER — MEROPENEM 1 G/30ML
INJECTION INTRAVENOUS
Refills: 0 | Status: DISCONTINUED | OUTPATIENT
Start: 2019-06-24 | End: 2019-06-24

## 2019-06-24 RX ORDER — MEROPENEM 1 G/30ML
1000 INJECTION INTRAVENOUS EVERY 8 HOURS
Refills: 0 | Status: DISCONTINUED | OUTPATIENT
Start: 2019-06-24 | End: 2019-06-24

## 2019-06-24 RX ORDER — SODIUM CHLORIDE 9 MG/ML
1000 INJECTION, SOLUTION INTRAVENOUS
Refills: 0 | Status: DISCONTINUED | OUTPATIENT
Start: 2019-06-24 | End: 2019-08-09

## 2019-06-24 RX ORDER — IPRATROPIUM/ALBUTEROL SULFATE 18-103MCG
3 AEROSOL WITH ADAPTER (GRAM) INHALATION EVERY 6 HOURS
Refills: 0 | Status: DISCONTINUED | OUTPATIENT
Start: 2019-06-24 | End: 2019-07-03

## 2019-06-24 RX ORDER — MEROPENEM 1 G/30ML
1000 INJECTION INTRAVENOUS ONCE
Refills: 0 | Status: COMPLETED | OUTPATIENT
Start: 2019-06-24 | End: 2019-06-24

## 2019-06-24 RX ORDER — METRONIDAZOLE 500 MG
TABLET ORAL
Refills: 0 | Status: DISCONTINUED | OUTPATIENT
Start: 2019-06-24 | End: 2019-06-27

## 2019-06-24 RX ADMIN — BUDESONIDE AND FORMOTEROL FUMARATE DIHYDRATE 2 PUFF(S): 160; 4.5 AEROSOL RESPIRATORY (INHALATION) at 18:58

## 2019-06-24 RX ADMIN — Medication 100 MILLIGRAM(S): at 23:01

## 2019-06-24 RX ADMIN — SODIUM CHLORIDE 50 MILLILITER(S): 9 INJECTION, SOLUTION INTRAVENOUS at 18:58

## 2019-06-24 RX ADMIN — SODIUM CHLORIDE 1000 MILLILITER(S): 9 INJECTION INTRAMUSCULAR; INTRAVENOUS; SUBCUTANEOUS at 12:49

## 2019-06-24 RX ADMIN — Medication 1: at 18:52

## 2019-06-24 RX ADMIN — CEFEPIME 100 MILLIGRAM(S): 1 INJECTION, POWDER, FOR SOLUTION INTRAMUSCULAR; INTRAVENOUS at 14:57

## 2019-06-24 RX ADMIN — CEFEPIME 100 MILLIGRAM(S): 1 INJECTION, POWDER, FOR SOLUTION INTRAMUSCULAR; INTRAVENOUS at 23:01

## 2019-06-24 RX ADMIN — Medication 650 MILLIGRAM(S): at 14:57

## 2019-06-24 RX ADMIN — Medication 1 APPLICATION(S): at 18:54

## 2019-06-24 RX ADMIN — SODIUM CHLORIDE 50 MILLILITER(S): 9 INJECTION, SOLUTION INTRAVENOUS at 23:02

## 2019-06-24 RX ADMIN — Medication 100 MILLIGRAM(S): at 17:48

## 2019-06-24 RX ADMIN — Medication 25 MILLIGRAM(S): at 14:56

## 2019-06-24 RX ADMIN — Medication 25 MILLIGRAM(S): at 20:37

## 2019-06-24 NOTE — H&P ADULT - PROBLEM SELECTOR PROBLEM 7
Prophylactic measure Uncomplicated asthma, unspecified asthma severity, unspecified whether persistent

## 2019-06-24 NOTE — CONSULT NOTE ADULT - SUBJECTIVE AND OBJECTIVE BOX
Patient is a 69y Female     Patient is a 69y old  Female who presents with a chief complaint of PEG tube dislodgement, fever, leukocytosis (24 Jun 2019 15:39)      HPI:      PAST MEDICAL & SURGICAL HISTORY:  CVA (cerebral vascular accident)  Fatty pancreas  PNA (pneumonia)  Pulmonary HTN  IGT (impaired glucose tolerance)  Ulcerative colitis  Acid reflux  Anxiety  Depression  Mouth sores  HLD (hyperlipidemia)  Asthma  Humeral head fracture  H/O: hysterectomy  H/O cataract extraction, left  History of knee replacement      MEDICATIONS  (STANDING):  dextrose 5%. 1000 milliLiter(s) (50 mL/Hr) IV Continuous <Continuous>  dextrose 50% Injectable 12.5 Gram(s) IV Push once  dextrose 50% Injectable 25 Gram(s) IV Push once  dextrose 50% Injectable 25 Gram(s) IV Push once  insulin lispro (HumaLOG) corrective regimen sliding scale   SubCutaneous every 6 hours  vancomycin  IVPB 1000 milliGRAM(s) IV Intermittent once      Allergies    ASA; dye contrast (Anaphylaxis)  aspirin (Short breath)  divalproex sodium (Other (Unknown))  Flowers and Plants (Short breath)  Haldol (Other (Unknown))  penicillin (Short breath; Rash)  sulfa drugs (Short breath; Rash)  vancomycin (Rash; Urticaria; Hives)  Xanax (Other (Unknown))    Intolerances        SOCIAL HISTORY:  Denies ETOh,Smoking,     FAMILY HISTORY:  Family history of dementia (Grandparent)  Family history of colon cancer (Grandparent)      REVIEW OF SYSTEMS:    CONSTITUTIONAL: No weakness, fevers or chills  EYES/ENT: No visual changes;  No vertigo or throat pain   NECK: No pain or stiffness  RESPIRATORY: No cough, wheezing, hemoptysis; No shortness of breath  CARDIOVASCULAR: No chest pain or palpitations  GASTROINTESTINAL: No abdominal or epigastric pain. No nausea, vomiting, or hematemesis; No diarrhea or constipation. No melena or hematochezia.  GENITOURINARY: No dysuria, frequency or hematuria  NEUROLOGICAL: No numbness or weakness  SKIN: No itching, burning, rashes, or lesions   All other review of systems is negative unless indicated above.    VITAL:  T(C): , Max: 38.4 (06-24-19 @ 13:49)  T(F): , Max: 101.2 (06-24-19 @ 13:49)  HR: 110 (06-24-19 @ 15:13)  BP: 146/75 (06-24-19 @ 15:13)  BP(mean): 88 (06-24-19 @ 14:55)  RR: 22 (06-24-19 @ 15:13)  SpO2: 100% (06-24-19 @ 15:13)  Wt(kg): --    I and O's:    Height (cm): 142.24 (06-24 @ 08:31)  Weight (kg): 38.6 (06-24 @ 08:31)  BMI (kg/m2): 19.1 (06-24 @ 08:31)  BSA (m2): 1.23 (06-24 @ 08:31)    PHYSICAL EXAM:    Constitutional: NAD  HEENT: PERRLA,   Neck: No JVD  Respiratory: CTA B/L  Cardiovascular: S1 and S2  Gastrointestinal: BS+, soft, NT/ND  Extremities: No peripheral edema  Neurological: A/O x 3, no focal deficits  Psychiatric: Normal mood, normal affect  : No Blackman  Skin: No rashes  Access: Not applicable  Back: No CVA tenderness    LABS:                        9.9    16.6  )-----------( 692      ( 24 Jun 2019 09:13 )             29.4     06-24    136  |  93<L>  |  34<H>  ----------------------------<  158<H>  4.0   |  28  |  0.42<L>    Ca    9.7      24 Jun 2019 09:13    TPro  6.6  /  Alb  3.2<L>  /  TBili  0.3  /  DBili  x   /  AST  9<L>  /  ALT  7<L>  /  AlkPhos  92  06-24          RADIOLOGY & ADDITIONAL STUDIES:

## 2019-06-24 NOTE — H&P ADULT - ASSESSMENT
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

## 2019-06-24 NOTE — CONSULT NOTE ADULT - ASSESSMENT
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     most l;ikely this event appears to be related  to peg being dislodged    would cover with meropenem pending cultures.  she probably has a chronic mrsa infection of spacer but I think we can hold vanco for now.  discussed with gi and family

## 2019-06-24 NOTE — ED PROVIDER NOTE - OBJECTIVE STATEMENT
69F pmhx of CVA, Alzheimers, PEG tube, Pulm HTN presents with bleeding from PEG tube site. Patient was placed approx. one year ago, ?never replaced. Was recently admitted for MRSA infection of her hip. Patient is contracted, nonverbal, presents with  at bedside. Did not note any fevers or vomiting. Not able to obtain ROS. Patient on Eliquis.

## 2019-06-24 NOTE — H&P ADULT - PROBLEM SELECTOR PROBLEM 4
Dementia without behavioral disturbance, unspecified dementia type Chronic deep vein thrombosis (DVT) of right lower extremity, unspecified vein

## 2019-06-24 NOTE — ED PROVIDER NOTE - PROGRESS NOTE DETAILS
Patient to be admitted for likely IR intervention to replace PEG tube. PEG tube is not removable at bedside. Mild oozing at site. Hgb stable. Patient spike fever in the ED. Given recent admission, she likely has a UTI. IV Abx ordered, blood cultures ordered.

## 2019-06-24 NOTE — H&P ADULT - PROBLEM SELECTOR PLAN 7
-Will hold AC for now until after procedure with IR tomorrow once cleared. Also, in setting of bleeding from PEG site this morning, will hold AC for now.  -Aspiration precautions and elevate HOB. -Hold home chronic prednisone until able to give meds through PEG again.  -C/w home inhalers.

## 2019-06-24 NOTE — H&P ADULT - NSICDXPASTMEDICALHX_GEN_ALL_CORE_FT
PAST MEDICAL HISTORY:  Acid reflux     Anxiety     Asthma     CVA (cerebral vascular accident)     Depression     Fatty pancreas     HLD (hyperlipidemia)     IGT (impaired glucose tolerance)     Mouth sores     PNA (pneumonia)     Pulmonary HTN     Ulcerative colitis PAST MEDICAL HISTORY:  Acid reflux     Anxiety     Asthma     CVA (cerebral vascular accident)     Dementia     Depression     DVT, lower extremity     Fatty pancreas     HLD (hyperlipidemia)     IGT (impaired glucose tolerance)     Mouth sores     PNA (pneumonia)     Pulmonary HTN     Ulcerative colitis PAST MEDICAL HISTORY:  Acid reflux     Anxiety     Asthma     CVA (cerebral vascular accident)     Dementia     Depression     DVT, lower extremity     Fatty pancreas     HLD (hyperlipidemia)     IGT (impaired glucose tolerance)     Mouth sores     PNA (pneumonia)     Pulmonary HTN     Type 2 diabetes mellitus     Ulcerative colitis

## 2019-06-24 NOTE — H&P ADULT - HISTORY OF PRESENT ILLNESS
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 ED today with PEG tube being dislodged with bleeding at the site, found to have fever and leukocytosis on admission. 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 ED today with PEG tube being dislodged with bleeding at the site, found to have fever and leukocytosis on admission.  thinks she must have pulled it out this morning. He says otherwise she was in her usual state of health at home. He says she has had some intermittent low grade fevers at home, which usually respond to Tylenol.   He says her Gavin was due to be changed early July 2019.   Last dose of Eliquis was 6/23/19 8pm. 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  thinks she must have pulled it out this morning. He says otherwise she was in her usual state of health at home. He says she has had some intermittent low grade fevers at home, which usually respond to Tylenol.   He says her Gavin was due to be changed early July 2019.   Last dose of Eliquis was 6/23/19 8pm. 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  thinks she must have pulled it out this morning. He says otherwise she was in her usual state of health at home. He says she has had some intermittent low grade fevers at home, which usually respond to Tylenol.   He says her Gavin was due to be changed early July 2019.   Last dose of Eliquis was 6/23/19 8pm. 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  thinks she must have pulled it out this morning. He says otherwise she was in her usual state of health at home. He says she has had some intermittent low grade fevers at home, which usually respond to Tylenol.   He says her Blackman was due to be changed early July 2019.   Last dose of Eliquis was 6/23/19 8pm.   In ED patient received 1LNS and cefepime.

## 2019-06-24 NOTE — ED PROVIDER NOTE - CLINICAL SUMMARY MEDICAL DECISION MAKING FREE TEXT BOX
69F p/w PEG tube bleeding around site. Will replace PEG tube, CT abd/pel for evaluation of bleeding and placement. Dc home if nothing concerning.

## 2019-06-24 NOTE — H&P ADULT - PROBLEM SELECTOR PROBLEM 6
Uncomplicated asthma, unspecified asthma severity, unspecified whether persistent Type 2 diabetes mellitus without complication, without long-term current use of insulin

## 2019-06-24 NOTE — H&P ADULT - PROBLEM SELECTOR PLAN 4
-Will hold AC for now until after procedure with IR tomorrow once cleared. Also, in setting of bleeding from PEG site this morning, will hold AC for now. -Will hold AC for now until after procedure with IR tomorrow once cleared. Also, in setting of bleeding from PEG site this morning, will hold AC for now.  -Last dose of Eliquis was 6/23 at 8pm. Should be in system ~24-48 hours.  -Monitor CBC closely.

## 2019-06-24 NOTE — H&P ADULT - PROBLEM SELECTOR PLAN 8
-Will hold AC for now until after procedure with IR tomorrow once cleared. Also, in setting of bleeding from PEG site this morning, will hold AC for now.  -Last dose of Eliquis was 6/23 at 8pm. Should be in system ~24-48 hours.   -Aspiration precautions and elevate HOB.

## 2019-06-24 NOTE — CONSULT NOTE ADULT - SUBJECTIVE AND OBJECTIVE BOX
Patient is a 69y old  Female who presents with a chief complaint of PEG tube dislodgement, fever, leukocytosis (24 Jun 2019 15:39)    HPI:      PAST MEDICAL & SURGICAL HISTORY:  CVA (cerebral vascular accident)  Fatty pancreas  PNA (pneumonia)  Pulmonary HTN  IGT (impaired glucose tolerance)  Ulcerative colitis  Acid reflux  Anxiety  Depression  Mouth sores  HLD (hyperlipidemia)  Asthma  Humeral head fracture  H/O: hysterectomy  H/O cataract extraction, left  History of knee replacement      Social history:    FAMILY HISTORY:  Family history of dementia (Grandparent)  Family history of colon cancer (Grandparent)       	      Allergies    ASA; dye contrast (Anaphylaxis)  aspirin (Short breath)  divalproex sodium (Other (Unknown))  Flowers and Plants (Short breath)  Haldol (Other (Unknown))  penicillin (Short breath; Rash)  sulfa drugs (Short breath; Rash)  vancomycin (Rash; Urticaria; Hives)  Xanax (Other (Unknown))    Intolerances        Antimicrobials:    vancomycin  IVPB 1000 milliGRAM(s) IV Intermittent once        Vital Signs Last 24 Hrs  T(C): 38.4 (24 Jun 2019 13:49), Max: 38.4 (24 Jun 2019 13:49)  T(F): 101.2 (24 Jun 2019 13:49), Max: 101.2 (24 Jun 2019 13:49)  HR: 110 (24 Jun 2019 15:13) (70 - 116)  BP: 146/75 (24 Jun 2019 15:13) (92/71 - 146/75)  BP(mean): 88 (24 Jun 2019 14:55) (88 - 88)  RR: 22 (24 Jun 2019 15:13) (16 - 24)  SpO2: 100% (24 Jun 2019 15:13) (92% - 100%)    PHYSICAL EXAM:Pleasant patient in no acute distress.       t    cachexia     Eyes:PERRL EOMI.NO discharge or conjunctival injection    ENMT:No sinus tenderness.No thrush.No pharyngeal exudate or erythema.Fair dental hygiene    Neck:Supple,No LN,no JVD      Respiratory:Good air entry bilaterally,CTA       Gastrointestinal:Soft BS(+) no tenderness no masses ,No rebound or guarding    Genitourinary:No CVA tendereness        Extremities: contracted     Vascular:peripheral pulses felt                                      9.9    16.6  )-----------( 692      ( 24 Jun 2019 09:13 )             29.4         06-24    136  |  93<L>  |  34<H>  ----------------------------<  158<H>  4.0   |  28  |  0.42<L>    Ca    9.7      24 Jun 2019 09:13    TPro  6.6  /  Alb  3.2<L>  /  TBili  0.3  /  DBili  x   /  AST  9<L>  /  ALT  7<L>  /  AlkPhos  92  06-24      RECENT CULTURES:  06-18 @ 18:59  .Blood  --  --  --    No growth at 5 days.  --      MICROBIOLOGY:  Culture Results:   No growth at 5 days. (06-18 @ 18:59)  Culture Results:   No growth at 5 days. (06-18 @ 18:59)          Radiology:      Assessment:        Recommendations and Plan:    Pager [phone]  After 5 pm/weekends or if no response :[phone]

## 2019-06-24 NOTE — H&P ADULT - NSHPPHYSICALEXAM_GEN_ALL_CORE
PHYSICAL EXAM:  Vital Signs Last 24 Hrs  T(C): 37.6 (06-24-19 @ 17:00)  T(F): 99.7 (06-24-19 @ 17:00), Max: 101.2 (06-24-19 @ 13:49)  HR: 115 (06-24-19 @ 17:00) (70 - 116)  BP: 92/62 (06-24-19 @ 17:00)  BP(mean): 88 (06-24-19 @ 14:55) (88 - 88)  RR: 22 (06-24-19 @ 17:00) (16 - 24)  SpO2: 100% (06-24-19 @ 17:00) (92% - 100%)  Wt(kg): --    Constitutional: NAD, awake and alert  EYES: EOMI  ENT:  Normal Hearing, no tonsillar exudates   Neck: Soft and supple, No JVD  Respiratory: Breath sounds are clear bilaterally, No wheezing, rales or rhonchi  Cardiovascular: S1 and S2, regular rate and rhythm, no Murmurs, gallops or rubs  Gastrointestinal: Bowel Sounds present, soft, nontender, nondistended, no guarding, no rebound  Extremities: No cyanosis or clubbing; warm to touch  Vascular: 2+ peripheral pulses lower ex  Neurological: A/O x 3, no focal deficits  Musculoskeletal: 5/5 strength b/l upper and lower extremities  Skin: No rashes  Psych: No depression or anhedonia  Heme: No bruises, no nose bleeds PHYSICAL EXAM:  Vital Signs Last 24 Hrs  T(C): 37.6 (06-24-19 @ 17:00)  T(F): 99.7 (06-24-19 @ 17:00), Max: 101.2 (06-24-19 @ 13:49)  HR: 115 (06-24-19 @ 17:00) (70 - 116)  BP: 92/62 (06-24-19 @ 17:00)  BP(mean): 88 (06-24-19 @ 14:55) (88 - 88)  RR: 22 (06-24-19 @ 17:00) (16 - 24)  SpO2: 100% (06-24-19 @ 17:00) (92% - 100%)  Wt(kg): --    Constitutional: NAD, very thin appearing.   ENT:  Unable to assess hearing.    Neck: Soft and supple  Respiratory: Clear but patient unable to cooperate with exam.   Cardiovascular: S1S2 normal, mildly tachy rate and rhythm, no Murmurs, gallops or rubs  Gastrointestinal: Bowel Sounds present, soft, nontender, nondistended, PEG tube site with dried dark blood. Blackman with sediments.   Extremities: No LE edema. Contractures. RLE in brace.   Neurological: Awake but doesn't answer questions or follow commands. Contracted.   Psych: No depression or anhedonia  Heme: No nose bleeds

## 2019-06-24 NOTE — H&P ADULT - PROBLEM SELECTOR PLAN 3
-Until able to give meds through PEG tube, will be unable to give home Seroquel, tizanidine, gabapentin, or clonazepam.  - prefers not to, but if necessary can probably give Ativan IV PRN for any agitation, but prefers to inform him first. -Until able to give meds through PEG tube, will be unable to give home Seroquel, tizanidine, gabapentin, or clonazepam.  - prefers not to, but if necessary can probably give Ativan IV PRN for any agitation, but prefers to inform him first.  -Patient also takes medical marijuana at home, which is ok to resume once cleared.

## 2019-06-24 NOTE — ED PROVIDER NOTE - ATTENDING CONTRIBUTION TO CARE
Patient is a 68 yo F, bed bound and non-verbal, here with family for dislodged PEG tube. PEG tube was placed last year and patient reportedly yanked on it, causing herself to bleed at insertion site. + clots noted. She is on eliquis. History sig for CVA, Alzheimers, Pulm HTN, chronic sacral wound, MRSA positive.      VS noted  Gen. no acute distress, Non toxic   HEENT: EOMI, mmm  Lungs: CTAB/L no C/ W /R   CVS: RRR   Abd; Soft non tender, non distended, PEG tube with clots at site, small amount of oozing  Ext: no edema  Skin: no rash  Neuro AAOx3 non focal clear speech  a/p: dislodged PEG tube - patient on Eliquis, will get labs, CT A/P to assess PEG tube placement and bleeding.   - Francisco BROWN

## 2019-06-24 NOTE — H&P ADULT - PROBLEM SELECTOR PROBLEM 3
Uncomplicated asthma, unspecified asthma severity, unspecified whether persistent Dementia without behavioral disturbance, unspecified dementia type

## 2019-06-24 NOTE — CONSULT NOTE ADULT - ASSESSMENT
would not use peg.  blood at site. dislodged on ct.  pt will need ir to try repelace, othewise will need off a/c 3 days prior to repeat peg.

## 2019-06-24 NOTE — H&P ADULT - PROBLEM SELECTOR PLAN 2
-Presents with fever, tachycardia, tachypnea, and leukocytosis.   -Possibly urinary source given recent UTI and chronic Blackman, possibly from sacral ulcers, possibly from abdominal source given PEG tube dislodgement.   -F/u blood cultures. -UA and urine culture ordered.   -Chronic Blackman was due to be changed early July, so will change now.   -ID recs appreciated.  -Cefepime and flagyl for now, per discussion with Dr. Brand from ID. - thought maybe she had a reaction to meropenem in the past before.  -S/p 1L NS in ED. -Will give gentle IVF's while NPO and in setting of sepsis, but caution to avoid fluid overload in setting of chronic systolic heart failure. -Presents with fever, tachycardia, tachypnea, and leukocytosis.   -Possibly urinary source given recent UTI and chronic Blackman, possibly from sacral ulcers, possibly from abdominal source given PEG tube dislodgement.   -F/u blood cultures. -UA and urine culture ordered.   -Chronic Blackman was due to be changed early July, so will change now.   -ID recs appreciated.  -Cefepime and flagyl for now, per discussion with Dr. Brand from ID. - thought maybe she had a reaction to meropenem in the past before.  -S/p 1L NS in ED. -Will give gentle IVF's while NPO and in setting of sepsis, but caution to avoid fluid overload in setting of chronic systolic heart failure.  -Monitor I's/O's in setting of sepsis. -Vitals Q4H for now.  -Tylenol suppository PRN for fevers.

## 2019-06-24 NOTE — H&P ADULT - PROBLEM SELECTOR PLAN 6
-Hold home chronic prednisone until able to give meds through PEG again.  -C/w home inhalers. -Hold home oral meds.   -DORINA Q6H for now.  -Nutrition eval.

## 2019-06-24 NOTE — H&P ADULT - PROBLEM SELECTOR PLAN 5
-Wound care eval requested.   -Otherwise, c/w previous topical wound care regimen. -Wound care eval requested.   -Otherwise, c/w previous topical wound care regimen for now.

## 2019-06-24 NOTE — H&P ADULT - NSICDXPASTSURGICALHX_GEN_ALL_CORE_FT
PAST SURGICAL HISTORY:  H/O cataract extraction, left     H/O: hysterectomy     History of knee replacement     Humeral head fracture PAST SURGICAL HISTORY:  H/O cataract extraction, left     H/O: hysterectomy     History of knee replacement     Humeral head fracture     S/P percutaneous endoscopic gastrostomy (PEG) tube placement for dysphagia

## 2019-06-24 NOTE — H&P ADULT - NSHPLABSRESULTS_GEN_ALL_CORE
LABS:                        9.9    16.6  )-----------( 692      ( 24 Jun 2019 09:13 )             29.4       06-24    136  |  93<L>  |  34<H>  ----------------------------<  158<H>  4.0   |  28  |  0.42<L>    Ca    9.7      24 Jun 2019 09:13    TPro  6.6  /  Alb  3.2<L>  /  TBili  0.3  /  DBili  x   /  AST  9<L>  /  ALT  7<L>  /  AlkPhos  92  06-24          CAPILLARY BLOOD GLUCOSE    PT/INR - ( 24 Jun 2019 09:13 )   PT: 16.2 sec;   INR: 1.39 ratio         PTT - ( 24 Jun 2019 09:13 )  PTT:30.5 sec    Lactate Trend    CARDIOLOGY:    RADIOLOGY:    < from: CT Abdomen and Pelvis w/ Oral Cont (06.24.19 @ 12:16) >    IMPRESSION:     Malpositioned PEG tube with balloon and tip in the left ventral abdominal   wall with small surrounding loculated fluid. No bowel obstruction.    < end of copied text > LABS:                        9.9    16.6  )-----------( 692      ( 24 Jun 2019 09:13 )             29.4       06-24    136  |  93<L>  |  34<H>  ----------------------------<  158<H>  4.0   |  28  |  0.42<L>    Ca    9.7      24 Jun 2019 09:13    TPro  6.6  /  Alb  3.2<L>  /  TBili  0.3  /  DBili  x   /  AST  9<L>  /  ALT  7<L>  /  AlkPhos  92  06-24          CAPILLARY BLOOD GLUCOSE    PT/INR - ( 24 Jun 2019 09:13 )   PT: 16.2 sec;   INR: 1.39 ratio         PTT - ( 24 Jun 2019 09:13 )  PTT:30.5 sec    Lactate Trend    CARDIOLOGY:    RADIOLOGY:    < from: CT Abdomen and Pelvis w/ Oral Cont (06.24.19 @ 12:16) >    IMPRESSION:     Malpositioned PEG tube with balloon and tip in the left ventral abdominal   wall with small surrounding loculated fluid. No bowel obstruction.    < end of copied text >    < from: Xray Chest 1 View AP/PA (06.24.19 @ 14:17) >    Impression:    The heart is enlarged. Left lingular pneumonia cannot be ruled out   entirely. A lateral radiograph should be obtained. Degenerative changes   of the thoracic spine.    < end of copied text >

## 2019-06-24 NOTE — ED PROVIDER NOTE - PHYSICAL EXAMINATION
Fabiana Rosario, DO.:   GENERAL: Patient awake NAD. Nonverbal, contracted.  HEENT: NC/AT, Moist mucous membranes, PERRL, EOMI.  LUNGS: CTAB, no wheezes or crackles.   CARDIAC: RRR, no m/r/g.    ABDOMEN: Soft, NT, ND, No rebound, guarding. PEG tube site with oozing blood from site. Clot around base of PEG tube.  EXT: No edema. No calf tenderness.  MSK: No spinal tenderness, contracture of b/l UE and LE.  NEURO: A&Ox0 - nonverbal. Contracted. No changes from baseline as per family.  SKIN: Warm and dry. No rash.

## 2019-06-24 NOTE — H&P ADULT - NSHPATTENDINGPLANDISCUSS_GEN_ALL_CORE
Dr. Yan, IR resident, and Dr. Yan, IR resident, Dr. Brand, and patient's /daughters at bedside, and Dr. Yan, IR resident, Dr. Brand, and patient's /daughters at bedside, and NP Ritu

## 2019-06-24 NOTE — H&P ADULT - PROBLEM SELECTOR PLAN 1
-IR consulted; will replace tube tomorrow. -Keep NPO, don't use tube.   -Hold Eliquis for now until safe after tube replaced. - thinks she may have pulled it out sometime this morning. There was some bleeding at the site. Originally placed by Dr. Yan 8/2018.   -IR consulted; will replace tube tomorrow. -Keep NPO, don't use tube.   -Hold Eliquis for now until safe after tube replaced and once cleared.  -GI and IR recs appreciated. - thinks she may have pulled it out sometime this morning. There was some bleeding at the site. Originally placed by Dr. Yan 8/2018. Dr. Yan recommended to call IR for replacement.   -IR consulted; will replace tube tomorrow. -Keep NPO, don't use tube.   -Hold Eliquis for now until safe after tube replaced and once cleared.  -GI and IR recs appreciated.  -S/p 1L NS in ED. -Will give gentle IVF's while NPO and in setting of sepsis, but caution to avoid fluid overload in setting of chronic systolic heart failure.  -Hold home Lomotil for now until PEG is replaced. -C/w famotidine as IVPB for now.

## 2019-06-24 NOTE — H&P ADULT - ATTENDING COMMENTS
Zak Rodríguez MD  Division of [facility] Medicine  Cell: [phone]  Pager: [phone]  Office: [phone]

## 2019-06-24 NOTE — ED ADULT NURSE NOTE - OBJECTIVE STATEMENT
69F pmhx of CVA, Alzheimers, PEG tube, Pulm HTN presents with bleeding from PEG tube site. Patient was placed approx. one year ago, ?never replaced. Was recently admitted for MRSA infection of her hip. Patient is contracted, nonverbal, presents with  at bedside. Did not note any fevers or vomiting. Not able to obtain ROS. Patient on Eliquis. 69F pmhx of CVA, Alzheimers, PEG tube, Pulm HTN presents with bleeding from PEG tube site. Patient was placed approx. one year ago, ?never replaced. Was recently admitted for MRSA infection of her hip. Patient is contracted, nonverbal, presents with  at bedside. Did not note any fevers or vomiting. Not able to obtain ROS. Patient on Eliquis. Bleeding from peg TUBE site . Dressings applied Stage 4 to sacral and back. Assisted with toileting Afebrile Temp 98.4 Rectal. Family at bedside.

## 2019-06-25 LAB
ALBUMIN SERPL ELPH-MCNC: 2.8 G/DL — LOW (ref 3.3–5)
ALP SERPL-CCNC: 72 U/L — SIGNIFICANT CHANGE UP (ref 40–120)
ALT FLD-CCNC: 8 U/L — LOW (ref 10–45)
ANION GAP SERPL CALC-SCNC: 14 MMOL/L — SIGNIFICANT CHANGE UP (ref 5–17)
AST SERPL-CCNC: 9 U/L — LOW (ref 10–40)
BILIRUB SERPL-MCNC: 0.3 MG/DL — SIGNIFICANT CHANGE UP (ref 0.2–1.2)
BUN SERPL-MCNC: 36 MG/DL — HIGH (ref 7–23)
CALCIUM SERPL-MCNC: 8.9 MG/DL — SIGNIFICANT CHANGE UP (ref 8.4–10.5)
CHLORIDE SERPL-SCNC: 99 MMOL/L — SIGNIFICANT CHANGE UP (ref 96–108)
CO2 SERPL-SCNC: 26 MMOL/L — SIGNIFICANT CHANGE UP (ref 22–31)
CREAT SERPL-MCNC: 0.56 MG/DL — SIGNIFICANT CHANGE UP (ref 0.5–1.3)
GLUCOSE BLDC GLUCOMTR-MCNC: 149 MG/DL — HIGH (ref 70–99)
GLUCOSE BLDC GLUCOMTR-MCNC: 159 MG/DL — HIGH (ref 70–99)
GLUCOSE BLDC GLUCOMTR-MCNC: 163 MG/DL — HIGH (ref 70–99)
GLUCOSE BLDC GLUCOMTR-MCNC: 175 MG/DL — HIGH (ref 70–99)
GLUCOSE SERPL-MCNC: 131 MG/DL — HIGH (ref 70–99)
HCT VFR BLD CALC: 23.5 % — LOW (ref 34.5–45)
HGB BLD-MCNC: 7.1 G/DL — LOW (ref 11.5–15.5)
MCHC RBC-ENTMCNC: 29.1 PG — SIGNIFICANT CHANGE UP (ref 27–34)
MCHC RBC-ENTMCNC: 30.2 GM/DL — LOW (ref 32–36)
MCV RBC AUTO: 96.3 FL — SIGNIFICANT CHANGE UP (ref 80–100)
PLATELET # BLD AUTO: 483 K/UL — HIGH (ref 150–400)
POTASSIUM SERPL-MCNC: 3.8 MMOL/L — SIGNIFICANT CHANGE UP (ref 3.5–5.3)
POTASSIUM SERPL-SCNC: 3.8 MMOL/L — SIGNIFICANT CHANGE UP (ref 3.5–5.3)
PROT SERPL-MCNC: 5.6 G/DL — LOW (ref 6–8.3)
RBC # BLD: 2.44 M/UL — LOW (ref 3.8–5.2)
RBC # FLD: 15.9 % — HIGH (ref 10.3–14.5)
SODIUM SERPL-SCNC: 139 MMOL/L — SIGNIFICANT CHANGE UP (ref 135–145)
WBC # BLD: 14.13 K/UL — HIGH (ref 3.8–10.5)
WBC # FLD AUTO: 14.13 K/UL — HIGH (ref 3.8–10.5)

## 2019-06-25 PROCEDURE — 99222 1ST HOSP IP/OBS MODERATE 55: CPT

## 2019-06-25 PROCEDURE — 99232 SBSQ HOSP IP/OBS MODERATE 35: CPT

## 2019-06-25 PROCEDURE — 49450 REPLACE G/C TUBE PERC: CPT

## 2019-06-25 RX ORDER — CHLORHEXIDINE GLUCONATE 213 G/1000ML
1 SOLUTION TOPICAL DAILY
Refills: 0 | Status: DISCONTINUED | OUTPATIENT
Start: 2019-06-25 | End: 2019-07-15

## 2019-06-25 RX ORDER — ASCORBIC ACID 60 MG
500 TABLET,CHEWABLE ORAL DAILY
Refills: 0 | Status: DISCONTINUED | OUTPATIENT
Start: 2019-06-25 | End: 2019-07-30

## 2019-06-25 RX ORDER — APIXABAN 2.5 MG/1
5 TABLET, FILM COATED ORAL EVERY 12 HOURS
Refills: 0 | Status: DISCONTINUED | OUTPATIENT
Start: 2019-06-25 | End: 2019-06-26

## 2019-06-25 RX ORDER — QUETIAPINE FUMARATE 200 MG/1
25 TABLET, FILM COATED ORAL DAILY
Refills: 0 | Status: DISCONTINUED | OUTPATIENT
Start: 2019-06-25 | End: 2019-07-13

## 2019-06-25 RX ORDER — CLONAZEPAM 1 MG
1.5 TABLET ORAL AT BEDTIME
Refills: 0 | Status: DISCONTINUED | OUTPATIENT
Start: 2019-06-25 | End: 2019-07-02

## 2019-06-25 RX ORDER — APIXABAN 2.5 MG/1
5 TABLET, FILM COATED ORAL
Refills: 0 | Status: DISCONTINUED | OUTPATIENT
Start: 2019-06-25 | End: 2019-06-25

## 2019-06-25 RX ORDER — CLONAZEPAM 1 MG
1 TABLET ORAL DAILY
Refills: 0 | Status: DISCONTINUED | OUTPATIENT
Start: 2019-06-25 | End: 2019-06-25

## 2019-06-25 RX ORDER — ZINC SULFATE TAB 220 MG (50 MG ZINC EQUIVALENT) 220 (50 ZN) MG
220 TAB ORAL DAILY
Refills: 0 | Status: DISCONTINUED | OUTPATIENT
Start: 2019-06-25 | End: 2019-08-07

## 2019-06-25 RX ORDER — FERROUS SULFATE 325(65) MG
300 TABLET ORAL DAILY
Refills: 0 | Status: DISCONTINUED | OUTPATIENT
Start: 2019-06-25 | End: 2019-08-07

## 2019-06-25 RX ORDER — TIZANIDINE 4 MG/1
4 TABLET ORAL
Refills: 0 | Status: DISCONTINUED | OUTPATIENT
Start: 2019-06-25 | End: 2019-07-13

## 2019-06-25 RX ORDER — ERGOCALCIFEROL 1.25 MG/1
5000 CAPSULE ORAL DAILY
Refills: 0 | Status: DISCONTINUED | OUTPATIENT
Start: 2019-06-25 | End: 2019-07-12

## 2019-06-25 RX ORDER — FERROUS SULFATE 325(65) MG
5 TABLET ORAL DAILY
Refills: 0 | Status: DISCONTINUED | OUTPATIENT
Start: 2019-06-25 | End: 2019-06-25

## 2019-06-25 RX ORDER — GABAPENTIN 400 MG/1
300 CAPSULE ORAL
Refills: 0 | Status: DISCONTINUED | OUTPATIENT
Start: 2019-06-25 | End: 2019-07-13

## 2019-06-25 RX ADMIN — Medication 1: at 07:20

## 2019-06-25 RX ADMIN — Medication 100 MILLIGRAM(S): at 08:13

## 2019-06-25 RX ADMIN — QUETIAPINE FUMARATE 25 MILLIGRAM(S): 200 TABLET, FILM COATED ORAL at 23:12

## 2019-06-25 RX ADMIN — GABAPENTIN 300 MILLIGRAM(S): 400 CAPSULE ORAL at 21:12

## 2019-06-25 RX ADMIN — Medication 1.5 MILLIGRAM(S): at 23:13

## 2019-06-25 RX ADMIN — BUDESONIDE AND FORMOTEROL FUMARATE DIHYDRATE 2 PUFF(S): 160; 4.5 AEROSOL RESPIRATORY (INHALATION) at 07:19

## 2019-06-25 RX ADMIN — APIXABAN 5 MILLIGRAM(S): 2.5 TABLET, FILM COATED ORAL at 21:12

## 2019-06-25 RX ADMIN — Medication 100 MILLIGRAM(S): at 17:07

## 2019-06-25 RX ADMIN — TIZANIDINE 4 MILLIGRAM(S): 4 TABLET ORAL at 23:12

## 2019-06-25 RX ADMIN — Medication 1: at 13:11

## 2019-06-25 RX ADMIN — CEFEPIME 100 MILLIGRAM(S): 1 INJECTION, POWDER, FOR SOLUTION INTRAMUSCULAR; INTRAVENOUS at 07:18

## 2019-06-25 RX ADMIN — BUDESONIDE AND FORMOTEROL FUMARATE DIHYDRATE 2 PUFF(S): 160; 4.5 AEROSOL RESPIRATORY (INHALATION) at 21:11

## 2019-06-25 RX ADMIN — Medication 100 MILLIGRAM(S): at 23:11

## 2019-06-25 RX ADMIN — Medication 650 MILLIGRAM(S): at 07:18

## 2019-06-25 RX ADMIN — CEFEPIME 100 MILLIGRAM(S): 1 INJECTION, POWDER, FOR SOLUTION INTRAMUSCULAR; INTRAVENOUS at 14:00

## 2019-06-25 RX ADMIN — CEFEPIME 100 MILLIGRAM(S): 1 INJECTION, POWDER, FOR SOLUTION INTRAMUSCULAR; INTRAVENOUS at 21:13

## 2019-06-25 NOTE — DIETITIAN INITIAL EVALUATION ADULT. - ADD RECOMMEND
1) Continue Multivitamin/mineral, add vitamin C, and consider Zinc x 10 days to optimize nutrient intake and help with pressure ulcer healing. 2) Malnutrition notification placed in chart - discussed EN, supplements, vitamins, and nutritional supplementation with NP. 3) Provided education on increased kcal and protein needs to help with pressure ulcer healing, provided recommendations to optimize via EN.  amenable. 1) Recommend Multivitamin/mineral, vitamin C, and consider Zinc x 10 days to optimize nutrient intake and help with pressure ulcer healing. 2) Malnutrition notification placed in chart - discussed EN, supplements, vitamins, and nutritional supplementation with NP. 3) Provided education on increased kcal and protein needs to help with pressure ulcer healing, provided recommendations to optimize via EN.  amenable.

## 2019-06-25 NOTE — PROGRESS NOTE ADULT - ASSESSMENT
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     most l;ikely this event appears to be related  to peg being dislodged   no new rash this morning   she probably has a chronic mrsa infection of spacer but I think we can hold vanco for now.  discussed with gi and family   currently on cefepime and flagyl  ( unclear if she is really allergic to meropenem.

## 2019-06-25 NOTE — DISCHARGE NOTE NURSING/CASE MANAGEMENT/SOCIAL WORK - NSDCPEWEB_GEN_ALL_CORE
[facility] for Tobacco Control website --- [email or web address] website --- [email or web address]

## 2019-06-25 NOTE — DISCHARGE NOTE NURSING/CASE MANAGEMENT/SOCIAL WORK - NSDCPEPTSTRK_GEN_ALL_CORE
Need for follow up after discharge/Stroke education booklet/Stroke warning signs and symptoms/Signs and symptoms of stroke/Prescribed medications/Call 911 for stroke/Risk factors for stroke/Stroke support groups for patients, families, and friends

## 2019-06-25 NOTE — DIETITIAN INITIAL EVALUATION ADULT. - PROBLEM SELECTOR PLAN 3
-Until able to give meds through PEG tube, will be unable to give home Seroquel, tizanidine, gabapentin, or clonazepam.  - prefers not to, but if necessary can probably give Ativan IV PRN for any agitation, but prefers to inform him first.  -Patient also takes medical marijuana at home, which is ok to resume once cleared.

## 2019-06-25 NOTE — DIETITIAN INITIAL EVALUATION ADULT. - PHYSICAL APPEARANCE
Nutrition Focused Physical Exam deferred at this time due to pt sleeping; unable to visually assess as pt covered in blankets

## 2019-06-25 NOTE — DIETITIAN INITIAL EVALUATION ADULT. - PROBLEM SELECTOR PLAN 2
-Presents with fever, tachycardia, tachypnea, and leukocytosis.   -Possibly urinary source given recent UTI and chronic Blackman, possibly from sacral ulcers, possibly from abdominal source given PEG tube dislodgement.   -F/u blood cultures. -UA and urine culture ordered.   -Chronic Blackman was due to be changed early July, so will change now.   -ID recs appreciated.  -Cefepime and flagyl for now, per discussion with Dr. Brand from ID. - thought maybe she had a reaction to meropenem in the past before.  -S/p 1L NS in ED. -Will give gentle IVF's while NPO and in setting of sepsis, but caution to avoid fluid overload in setting of chronic systolic heart failure.  -Monitor I's/O's in setting of sepsis. -Vitals Q4H for now.  -Tylenol suppository PRN for fevers.

## 2019-06-25 NOTE — CONSULT NOTE ADULT - SUBJECTIVE AND OBJECTIVE BOX
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  thinks she must have pulled it out this morning. He says otherwise she was in her usual state of health at home. He says she has had some intermittent low grade fevers at home, which usually respond to Tylenol.   He says her Blackman was due to be changed early July 2019.   Last dose of Eliquis was 6/23/19 8pm.  In ED patient received 1LNS and cefepime.     Patient was seen with Dr. Shin. Mr. Colon was at the bedside to provide history ROS. She is well known to and followed by the [facility].    PAST MEDICAL & SURGICAL HISTORY:  Type 2 diabetes mellitus  Dementia  DVT, lower extremity  CVA (cerebral vascular accident)  Fatty pancreas  PNA (pneumonia)  Pulmonary HTN  IGT (impaired glucose tolerance)  Ulcerative colitis  Acid reflux  Anxiety  Depression  Mouth sores  HLD (hyperlipidemia)  Asthma  S/P percutaneous endoscopic gastrostomy (PEG) tube placement: for dysphagia  Humeral head fracture  H/O: hysterectomy  H/O cataract extraction, left  History of knee replacement    REVIEW OF SYSTEMS  Patient is non-verbal. The ROS was provided by spouse who was at bedside during this visit.    General: chronically ill with multiple prolonged hospitalization, fever this am  Respiratory and Thorax: asthma on chronic prednisone  Gastrointestinal:	PEG tube dislodged	  Genitourinary: multiple UTIs  Musculoskeletal:	 severe contractions in bilateral upper and lower extremities  Neurological: baseline non-verbal, opens eyes	  Endocrine: diabetes, glycemic control with oral meds at home, s/s insulin as inpatient  Skin: multiple pressure injuries, chronic    MEDICATIONS  (STANDING):  buDESOnide  80 MICROgram(s)/formoterol 4.5 MICROgram(s) Inhaler 2 Puff(s) Inhalation two times a day  cefepime   IVPB 1000 milliGRAM(s) IV Intermittent every 8 hours  chlorhexidine 2% Cloths 1 Application(s) Topical daily  collagenase Ointment 1 Application(s) Topical daily  collagenase Ointment 1 Application(s) Topical daily  Dakins Solution - 1/4 Strength 1 Application(s) Topical two times a day  dextrose 5% + sodium chloride 0.9%. 1000 milliLiter(s) (50 mL/Hr) IV Continuous <Continuous>  dextrose 5%. 1000 milliLiter(s) (50 mL/Hr) IV Continuous <Continuous>  dextrose 50% Injectable 12.5 Gram(s) IV Push once  dextrose 50% Injectable 25 Gram(s) IV Push once  dextrose 50% Injectable 25 Gram(s) IV Push once  famotidine  IVPB 20 milliGRAM(s) IV Intermittent daily  insulin lispro (HumaLOG) corrective regimen sliding scale   SubCutaneous every 6 hours  LORazepam   Injectable 1 milliGRAM(s) IV Push once  Medical Marijuana Awake Oil 2 milliLiter(s) 2 milliLiter(s) Enteral Tube <User Schedule>  Medical Marijuana Calm Oil 2 milliLiter(s) 2 milliLiter(s) Enteral Tube <User Schedule>  Medical Marijuana [place] Oil 2 milliLiter(s) 2 milliLiter(s) Enteral Tube <User Schedule>  metroNIDAZOLE  IVPB 500 milliGRAM(s) IV Intermittent every 8 hours  metroNIDAZOLE  IVPB      tiotropium 18 MICROgram(s) Capsule 1 Capsule(s) Inhalation daily    MEDICATIONS  (PRN):  acetaminophen  Suppository .. 650 milliGRAM(s) Rectal every 6 hours PRN Temp greater or equal to 38C (100.4F), Mild Pain (1 - 3)  ALBUTerol    90 MICROgram(s) HFA Inhaler 1 Puff(s) Inhalation every 4 hours PRN Shortness of Breath and/or Wheezing  ALBUTerol/ipratropium for Nebulization 3 milliLiter(s) Nebulizer every 6 hours PRN Shortness of Breath and/or Wheezing  carboxymethylcellulose 0.5% (Preservative-Free) Ophthalmic Solution 1 Drop(s) Both EYES three times a day PRN dry eyes  dextrose 40% Gel 15 Gram(s) Oral once PRN Blood Glucose LESS THAN 70 milliGRAM(s)/deciliter  glucagon  Injectable 1 milliGRAM(s) IntraMuscular once PRN Glucose LESS THAN 70 milligrams/deciliter  nystatin Powder 1 Application(s) Topical three times a day PRN under breasts      Allergies    ASA; dye contrast (Anaphylaxis)  aspirin (Short breath)  divalproex sodium (Other (Unknown))  Flowers and Plants (Short breath)  Haldol (Other (Unknown))  penicillin (Short breath; Rash)  sulfa drugs (Short breath; Rash)  vancomycin (Rash; Urticaria; Hives)  Xanax (Other (Unknown))    SOCIAL HISTORY:  , (+)HHA/ lives in private home    FAMILY HISTORY:  Family history of dementia (Grandparent)  Family history of colon cancer (Grandparent)    Vital Signs Last 24 Hrs  T(C): 37.4 (25 Jun 2019 11:50), Max: 38.4 (24 Jun 2019 13:49)  T(F): 99.4 (25 Jun 2019 11:50), Max: 101.2 (24 Jun 2019 13:49)  HR: 90 (25 Jun 2019 11:50) (90 - 116)  BP: 127/62 (25 Jun 2019 11:50) (91/59 - 146/75)  BP(mean): 88 (24 Jun 2019 14:55) (88 - 88)  RR: 18 (25 Jun 2019 11:50) (16 - 24)  SpO2: 99% (25 Jun 2019 11:50) (99% - 100%)    Physical Exam:  General: cachectic, frail, chronically ill appearing  Respiratory: no cough or SOB, O2 NC in place  Gastrointestinal soft NT/ND (+) PEG (dislodged)  Neurology: not following commands, responds to 's questions by nodding head slightly yes or no  Musculoskeletal: severe upper and lower extremity contractions, Left leg with brace in place  Vascular: + BLE DP/PT pulses palpable, BLE equally warm, no acute ischemia noted  Skin:    Thoracic spine wound 100% covered with soft, moist, yellow slough, L 4.5cm x W 2.5cm x D 0.3cm moderate serosanguinous drainage, no induration, fluctuance, periwound intact.  Sacral wound to through muscle with no palpable bone, small area of white slough on right aspect of wound bed which is otherwise red with some granulation tissue, L 10cmc x W 10.5cm x D 3cm , > 2cm undermining on Left side of wound edge which is open. There is no induration, fluctuance, moderate serosanguinous drainage, periwound as with some blotchy redness    Left ankle lateral malleolus no necrotic tissue, white fibrinous tissue wound base, open edges, scant amount of serosangiunous drainage, L 0.5cm x L 0.5cm x D negligible, no induration, fluctuance or erythema.   Right medial lower leg with purple/maroon discoloration and small area of superficial epithelial loss L 5.7cm x W 0.7cm x D negligible in open area and unknown in discolored area, scant serosangiunous drainage, no erythema, induration, fluctuance or odor.     LABS:  06-25    139  |  99  |  36<H>  ----------------------------<  131<H>  3.8   |  26  |  0.56    Ca    8.9      25 Jun 2019 10:30    TPro  5.6<L>  /  Alb  2.8<L>  /  TBili  0.3  /  DBili  x   /  AST  9<L>  /  ALT  8<L>  /  AlkPhos  72  06-25                          9.9    16.6  )-----------( 692      ( 24 Jun 2019 09:13 )             29.4     PT/INR - ( 24 Jun 2019 09:13 )   PT: 16.2 sec;   INR: 1.39 ratio         PTT - ( 24 Jun 2019 09:13 )  PTT:30.5 sec

## 2019-06-25 NOTE — CHART NOTE - NSCHARTNOTEFT_GEN_A_CORE
Upon Nutritional Assessment by the Registered Dietitian your patient was determined to meet criteria / has evidence of the following diagnosis/diagnoses:          [x]  Mild Protein Calorie Malnutrition        [ ]  Moderate Protein Calorie Malnutrition        [ ] Severe Protein Calorie Malnutrition        [ ] Unspecified Protein Calorie Malnutrition        [ ] Underweight / BMI <19        [ ] Morbid Obesity / BMI > 40      Findings as based on:  [x] Comprehensive nutrition assessment   [ ] Nutrition Focused Physical Exam  [x] Other: Pt NPO x 2 days; fluid accumulation; possible weight loss; multiple pressure ulcers     Nutrition Plan/Recommendations:    1. Once medically feasible, recommend Glucerna 1.2 through bolus feedings via PEG at 240 ml every 6 hours (4xday) to provide 960 ml, 1152 kcal, 58 g protein, and 773 ml water (29 kcal/kg and 1.5 g/kg protein based on upper UBW weight 39.9 kg); add 45 ml of water before and after each feeding (total 1133 ml water/day) + no carb Prosource 2xday (120 kcal and 30 g protein) to optimize protein provision. Will monitor weight and consider adding one more can of Glucerna 1.2 if pt loses weight.   2. Recommend Multivitamin/mineral, vitamin C, and consider Zinc x 10 days to optimize nutrient intake and help with pressure ulcer healing.   3. Provided education on increased kcal and protein needs to help with pressure ulcer healing.   4. Obtain current weight to identify changes if any.     RD remains available,   Nicole Gutierrez MS, RDN, #[phone]     PROVIDER Section:     By signing this assessment you are acknowledging and agree with the diagnosis/diagnoses assigned by the Registered Dietitian    Comments:

## 2019-06-25 NOTE — PROGRESS NOTE ADULT - SUBJECTIVE AND OBJECTIVE BOX
INTERVAL HPI/OVERNIGHT EVENTS:    MEDICATIONS  (STANDING):  buDESOnide  80 MICROgram(s)/formoterol 4.5 MICROgram(s) Inhaler 2 Puff(s) Inhalation two times a day  cefepime   IVPB 1000 milliGRAM(s) IV Intermittent every 8 hours  collagenase Ointment 1 Application(s) Topical daily  collagenase Ointment 1 Application(s) Topical daily  Dakins Solution - 1/4 Strength 1 Application(s) Topical two times a day  dextrose 5% + sodium chloride 0.9%. 1000 milliLiter(s) (50 mL/Hr) IV Continuous <Continuous>  dextrose 5%. 1000 milliLiter(s) (50 mL/Hr) IV Continuous <Continuous>  dextrose 50% Injectable 12.5 Gram(s) IV Push once  dextrose 50% Injectable 25 Gram(s) IV Push once  dextrose 50% Injectable 25 Gram(s) IV Push once  famotidine  IVPB 20 milliGRAM(s) IV Intermittent daily  insulin lispro (HumaLOG) corrective regimen sliding scale   SubCutaneous every 6 hours  LORazepam   Injectable 1 milliGRAM(s) IV Push once  metroNIDAZOLE  IVPB 500 milliGRAM(s) IV Intermittent every 8 hours  metroNIDAZOLE  IVPB      tiotropium 18 MICROgram(s) Capsule 1 Capsule(s) Inhalation daily    MEDICATIONS  (PRN):  acetaminophen  Suppository .. 650 milliGRAM(s) Rectal every 6 hours PRN Temp greater or equal to 38C (100.4F), Mild Pain (1 - 3)  ALBUTerol    90 MICROgram(s) HFA Inhaler 1 Puff(s) Inhalation every 4 hours PRN Shortness of Breath and/or Wheezing  ALBUTerol/ipratropium for Nebulization 3 milliLiter(s) Nebulizer every 6 hours PRN Shortness of Breath and/or Wheezing  carboxymethylcellulose 0.5% (Preservative-Free) Ophthalmic Solution 1 Drop(s) Both EYES three times a day PRN dry eyes  dextrose 40% Gel 15 Gram(s) Oral once PRN Blood Glucose LESS THAN 70 milliGRAM(s)/deciliter  glucagon  Injectable 1 milliGRAM(s) IntraMuscular once PRN Glucose LESS THAN 70 milligrams/deciliter  nystatin Powder 1 Application(s) Topical three times a day PRN under breasts      Allergies    ASA; dye contrast (Anaphylaxis)  aspirin (Short breath)  divalproex sodium (Other (Unknown))  Flowers and Plants (Short breath)  Haldol (Other (Unknown))  penicillin (Short breath; Rash)  sulfa drugs (Short breath; Rash)  vancomycin (Rash; Urticaria; Hives)  Xanax (Other (Unknown))    Intolerances            PHYSICAL EXAM:   Vital Signs:  Vital Signs Last 24 Hrs  T(C): 38.2 (25 Jun 2019 06:51), Max: 38.4 (24 Jun 2019 13:49)  T(F): 100.8 (25 Jun 2019 06:51), Max: 101.2 (24 Jun 2019 13:49)  HR: 107 (25 Jun 2019 06:31) (101 - 116)  BP: 124/71 (25 Jun 2019 06:31) (91/59 - 146/75)  BP(mean): 88 (24 Jun 2019 14:55) (88 - 88)  RR: 20 (25 Jun 2019 06:31) (16 - 24)  SpO2: 100% (24 Jun 2019 23:48) (99% - 100%)  Daily     Daily     GENERAL:  no distress  HEENT:  NC/AT,  anicteric  CHEST:   no increased effort, breath sounds clear  HEART:  Regular rhythm  ABDOMEN:  Soft, non-tender, non-distended, normoactive bowel sounds,  no masses ,no hepato-splenomegaly, no signs of chronic liver disease  EXTEREMITIES:  no cyanosis      LABS:                        9.9    16.6  )-----------( 692      ( 24 Jun 2019 09:13 )             29.4     06-24    136  |  93<L>  |  34<H>  ----------------------------<  158<H>  4.0   |  28  |  0.42<L>    Ca    9.7      24 Jun 2019 09:13    TPro  6.6  /  Alb  3.2<L>  /  TBili  0.3  /  DBili  x   /  AST  9<L>  /  ALT  7<L>  /  AlkPhos  92  06-24    PT/INR - ( 24 Jun 2019 09:13 )   PT: 16.2 sec;   INR: 1.39 ratio         PTT - ( 24 Jun 2019 09:13 )  PTT:30.5 sec      RADIOLOGY & ADDITIONAL TESTS:

## 2019-06-25 NOTE — DIETITIAN INITIAL EVALUATION ADULT. - PROBLEM SELECTOR PLAN 1
- thinks she may have pulled it out sometime this morning. There was some bleeding at the site. Originally placed by Dr. Yan 8/2018. Dr. Yan recommended to call IR for replacement.   -IR consulted; will replace tube tomorrow. -Keep NPO, don't use tube.   -Hold Eliquis for now until safe after tube replaced and once cleared.  -GI and IR recs appreciated.  -S/p 1L NS in ED. -Will give gentle IVF's while NPO and in setting of sepsis, but caution to avoid fluid overload in setting of chronic systolic heart failure.  -Hold home Lomotil for now until PEG is replaced. -C/w famotidine as IVPB for now.

## 2019-06-25 NOTE — CHART NOTE - NSCHARTNOTEFT_GEN_A_CORE
Blackman change deferred    Last night while  at bedside, he stated that his wife had a ruff day and many things were done to her, and would like the care coordinated tonight and the Blackman change to be deferred.  Risk and benefits explained to  verbalized understanding.    Plan  nursing staff to attempt in am    Will endorse to primary day team, attending to follow    Laura Gutierrez NP  [facility] [id number]

## 2019-06-25 NOTE — CHART NOTE - NSCHARTNOTEFT_GEN_A_CORE
Rash    Around 8pm last night. notified that patient had a rash. Two daughters at the bedside and states that mother was itching and arms and torso appeared red. daughters stated that she previous had a rash similar.  Medication history for past year reviewed found that patient did not receive any new medicitions. patient with an allergy to pencillin and cross sensitivity to penicillin likely    ROS deferred patient non-verbal    Skin with reddened slightly raised rash appearance to chest, arms and small amount on legs    Discussed the above with medical attending to Kimi.    Plan  continue with antibiotics (per Dr. Kimi Dul as needed for rash    Will continue to monitor will endorse to primary day team, attending to follow    Laura Gutierrez NP  spectralink [id number] Rash    Around 8pm last night. notified that patient had a rash. Two daughters at the bedside and states that mother was itching and arms and torso appeared red. daughters stated that she previous had a rash similar.  Medication history for past year reviewed found that patient did not receive any new medications. patient with an allergy to penicillin and cross sensitivity to penicillin likely    ROS deferred patient non-verbal    Skin with reddened slightly raised rash appearance to chest, arms and small amount on legs    Discussed the above with medical attending to Kimi.    Plan  continue with antibiotics (per Dr. Kimi Dul as needed for rash    Will continue to monitor will endorse to primary day team, attending to follow    Laura Gutierrez NP  [facility] [id number]

## 2019-06-25 NOTE — CONSULT NOTE ADULT - ASSESSMENT
mpression:    Sacral Stage 4 pressure injury  Thoracic spine unstageable pressure injury  L lateral malleolus unstageable pressure injury  Right medial lower leg deep tissue injury    Recommend:  1.) Topical therapy:  a.) Sacrum - irrigate with NS, pat dry, apply Aquacel AG, cover/pack lightly with  gauze, cover with DSD, secure with tegederm daily  b.) Thoracic spine wound - cleanse with NS, apply medihoney, cover with Allevyn foam daily  c.) L lateral malleolus - cleanse with NS, pat dry, apply allevyn Life silicone border dressing q3 days  d.) R medial lower leg - cleanse with NS, pat dry, apply allevyn Life silicone border dressing q3 days  2.) Emollient therapy: Moisturize intact skin w/ SWEEN cream daily  3.) Continue alternating air with low air loss surface  4.) Turn and reposition q2 hours  5.) Nutrition per GI  6.) Incontinence management - pericare, incontinence cleanse, underpads  7.) Care as per medicine will follow w/ you    Upon discharge [facility] [address] [place] [address] [phone]    Will follow.  Thank you for this consult  Wendy Hammonds, NP-C, CWOCN [id number]

## 2019-06-25 NOTE — PROGRESS NOTE ADULT - ASSESSMENT
A/P:  69yoF w/PEG tube initially placed by GI on 8/2018, with PEG tube dislodgement shown on CT 6/24.    -Plan is to replace percutaneous gastrostomy tube

## 2019-06-25 NOTE — PROGRESS NOTE ADULT - SUBJECTIVE AND OBJECTIVE BOX
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  thinks she must have pulled it out this morning. He says otherwise she was in her usual state of health at home. He says she has had some intermittent low grade fevers at home, which usually respond to Tylenol.   He says her Blackman was due to be changed early July 2019.   Last dose of Eliquis was 6/23/19 8pm.   In ED patient received 1LNS and cefepime. (24 Jun 2019 15:39)      NPO status:   Anticoagulation:  Antibiotics:     Allergies:ASA; dye contrast (Anaphylaxis)  aspirin (Short breath)  divalproex sodium (Other (Unknown))  Flowers and Plants (Short breath)  Haldol (Other (Unknown))  penicillin (Short breath; Rash)  sulfa drugs (Short breath; Rash)  vancomycin (Rash; Urticaria; Hives)  Xanax (Other (Unknown))      PAST MEDICAL & SURGICAL HISTORY:  Type 2 diabetes mellitus  Dementia  DVT, lower extremity  CVA (cerebral vascular accident)  Fatty pancreas  PNA (pneumonia)  Pulmonary HTN  IGT (impaired glucose tolerance)  Ulcerative colitis  Acid reflux  Anxiety  Depression  Mouth sores  HLD (hyperlipidemia)  Asthma  S/P percutaneous endoscopic gastrostomy (PEG) tube placement: for dysphagia  Humeral head fracture  H/O: hysterectomy  H/O cataract extraction, left  History of knee replacement        Pertinent labs:                      9.9    16.6  )-----------( 692      ( 24 Jun 2019 09:13 )             29.4   06-25    139  |  99  |  36<H>  ----------------------------<  131<H>  3.8   |  26  |  0.56    Ca    8.9      25 Jun 2019 10:30    TPro  5.6<L>  /  Alb  2.8<L>  /  TBili  0.3  /  DBili  x   /  AST  9<L>  /  ALT  8<L>  /  AlkPhos  72  06-25  PT/INR - ( 24 Jun 2019 09:13 )   PT: 16.2 sec;   INR: 1.39 ratio         PTT - ( 24 Jun 2019 09:13 )  PTT:30.5 sec    Consent: Procedure/risks/ Benefits explained. Informed consent obtained. Pt verbalizes understanding. 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  thinks she must have pulled it out this morning. He says otherwise she was in her usual state of health at home. He says she has had some intermittent low grade fevers at home, which usually respond to Tylenol.   He says her Blackman was due to be changed early July 2019.   Last dose of Eliquis was 6/23/19 8pm.   In ED patient received 1LNS and cefepime. (24 Jun 2019 15:39)      NPO status:   Anticoagulation:  Antibiotics:     Allergies:ASA; dye contrast (Anaphylaxis)  aspirin (Short breath)  divalproex sodium (Other (Unknown))  Flowers and Plants (Short breath)  Haldol (Other (Unknown))  penicillin (Short breath; Rash)  sulfa drugs (Short breath; Rash)  vancomycin (Rash; Urticaria; Hives)  Xanax (Other (Unknown))      PAST MEDICAL & SURGICAL HISTORY:  Type 2 diabetes mellitus  Dementia  DVT, lower extremity  CVA (cerebral vascular accident)  Fatty pancreas  PNA (pneumonia)  Pulmonary HTN  IGT (impaired glucose tolerance)  Ulcerative colitis  Acid reflux  Anxiety  Depression  Mouth sores  HLD (hyperlipidemia)  Asthma  S/P percutaneous endoscopic gastrostomy (PEG) tube placement: for dysphagia  Humeral head fracture  H/O: hysterectomy  H/O cataract extraction, left  History of knee replacement        Pertinent labs:                      9.9    16.6  )-----------( 692      ( 24 Jun 2019 09:13 )             29.4   06-25    139  |  99  |  36<H>  ----------------------------<  131<H>  3.8   |  26  |  0.56    Ca    8.9      25 Jun 2019 10:30    TPro  5.6<L>  /  Alb  2.8<L>  /  TBili  0.3  /  DBili  x   /  AST  9<L>  /  ALT  8<L>  /  AlkPhos  72  06-25  PT/INR - ( 24 Jun 2019 09:13 )   PT: 16.2 sec;   INR: 1.39 ratio         PTT - ( 24 Jun 2019 09:13 )  PTT:30.5 sec    Consent: Procedure/risks/ Benefits explained. Informed consent obtained from pts .

## 2019-06-25 NOTE — DIETITIAN INITIAL EVALUATION ADULT. - PROBLEM SELECTOR PLAN 4
-Will hold AC for now until after procedure with IR tomorrow once cleared. Also, in setting of bleeding from PEG site this morning, will hold AC for now.  -Last dose of Eliquis was 6/23 at 8pm. Should be in system ~24-48 hours.  -Monitor CBC closely.

## 2019-06-25 NOTE — DIETITIAN INITIAL EVALUATION ADULT. - ENTERAL
Once medically feasible, recommend Glucerna 1.2 through bolus feedings via PEG at 240 ml every 6 hours (4xday) to provide 960 ml, 1152 kcal, 58 g protein, and 773 ml water (29 kcal/kg and 1.5 g/kg protein based on upper UBW weight 39.9 kg); add 45 ml of water before and after each feeding (total 1133 ml water/day) + no carb Prosource 2xday (120 kcal and 30 g protein) to optimize protein provision. Will monitor weight and consider adding one more can of Glucerna 1.2 - discussed with NP.

## 2019-06-25 NOTE — PROGRESS NOTE ADULT - SUBJECTIVE AND OBJECTIVE BOX
infectious diseases progress note:    Patient is a 69y old  Female who presents with a chief complaint of PEG tube dislodgement, fever, leukocytosis (25 Jun 2019 08:54)        Gastrostomy malfunction             Allergies    ASA; dye contrast (Anaphylaxis)  aspirin (Short breath)  divalproex sodium (Other (Unknown))  Flowers and Plants (Short breath)  Haldol (Other (Unknown))  penicillin (Short breath; Rash)  sulfa drugs (Short breath; Rash)  vancomycin (Rash; Urticaria; Hives)  Xanax (Other (Unknown))    Intolerances        ANTIBIOTICS/RELEVANT:  antimicrobials  cefepime   IVPB 1000 milliGRAM(s) IV Intermittent every 8 hours  metroNIDAZOLE  IVPB 500 milliGRAM(s) IV Intermittent every 8 hours  metroNIDAZOLE  IVPB        immunologic:    OTHER:  acetaminophen  Suppository .. 650 milliGRAM(s) Rectal every 6 hours PRN  ALBUTerol    90 MICROgram(s) HFA Inhaler 1 Puff(s) Inhalation every 4 hours PRN  ALBUTerol/ipratropium for Nebulization 3 milliLiter(s) Nebulizer every 6 hours PRN  buDESOnide  80 MICROgram(s)/formoterol 4.5 MICROgram(s) Inhaler 2 Puff(s) Inhalation two times a day  carboxymethylcellulose 0.5% (Preservative-Free) Ophthalmic Solution 1 Drop(s) Both EYES three times a day PRN  collagenase Ointment 1 Application(s) Topical daily  collagenase Ointment 1 Application(s) Topical daily  Dakins Solution - 1/4 Strength 1 Application(s) Topical two times a day  dextrose 40% Gel 15 Gram(s) Oral once PRN  dextrose 5% + sodium chloride 0.9%. 1000 milliLiter(s) IV Continuous <Continuous>  dextrose 5%. 1000 milliLiter(s) IV Continuous <Continuous>  dextrose 50% Injectable 12.5 Gram(s) IV Push once  dextrose 50% Injectable 25 Gram(s) IV Push once  dextrose 50% Injectable 25 Gram(s) IV Push once  famotidine  IVPB 20 milliGRAM(s) IV Intermittent daily  glucagon  Injectable 1 milliGRAM(s) IntraMuscular once PRN  insulin lispro (HumaLOG) corrective regimen sliding scale   SubCutaneous every 6 hours  LORazepam   Injectable 1 milliGRAM(s) IV Push once  nystatin Powder 1 Application(s) Topical three times a day PRN  tiotropium 18 MICROgram(s) Capsule 1 Capsule(s) Inhalation daily      Objective:  Vital Signs Last 24 Hrs  T(C): 38.2 (25 Jun 2019 06:51), Max: 38.4 (24 Jun 2019 13:49)  T(F): 100.8 (25 Jun 2019 06:51), Max: 101.2 (24 Jun 2019 13:49)  HR: 107 (25 Jun 2019 06:31) (101 - 116)  BP: 124/71 (25 Jun 2019 06:31) (91/59 - 146/75)  BP(mean): 88 (24 Jun 2019 14:55) (88 - 88)  RR: 20 (25 Jun 2019 06:31) (16 - 24)  SpO2: 100% (24 Jun 2019 23:48) (99% - 100%)     Eyes:JACQUELIN, EOMI  Ear/Nose/Throat: no oral lesion, no sinus tenderness on percussion	  Neck:no JVD, no lymphadenopathy, supple  Respiratory: CTA maryjane  Cardiovascular: S1S2 RRR, no murmurs  Gastrointestinal:soft, (+) BS, no HSM  Extremities: contracted         LABS:                        9.9    16.6  )-----------( 692      ( 24 Jun 2019 09:13 )             29.4     06-24    136  |  93<L>  |  34<H>  ----------------------------<  158<H>  4.0   |  28  |  0.42<L>    Ca    9.7      24 Jun 2019 09:13    TPro  6.6  /  Alb  3.2<L>  /  TBili  0.3  /  DBili  x   /  AST  9<L>  /  ALT  7<L>  /  AlkPhos  92  06-24    PT/INR - ( 24 Jun 2019 09:13 )   PT: 16.2 sec;   INR: 1.39 ratio         PTT - ( 24 Jun 2019 09:13 )  PTT:30.5 sec        MICROBIOLOGY:    RECENT CULTURES:  06-18 @ 18:59 .Blood                No growth at 5 days.          RESPIRATORY CULTURES:              RADIOLOGY & ADDITIONAL STUDIES:        Pager [phone]  After 5 pm/weekends or if no response :[phone]

## 2019-06-25 NOTE — DIETITIAN INITIAL EVALUATION ADULT. - ENERGY NEEDS
Ht: 57 inches stated per  Wt: 88 pounds BMI: 19.0 kg/m2 IBW: 85 (+/-10%) 103.5 %IBW  Pertinent information: Pt 70 y/o F with PMH: multiple hospitalizations, dementia (non-verbal), dysphagia with PEG, functional quadriplegia, Blackman cath, chronic pressure ulcers in sacral and heels, asthma on Prednisone, HLD, CVA, severe lordosis/kyphoscoliosis, chronic MRSA of right hip prothesis, left knee fracture, DM, anxiety, UTI, admitted with PEG dislodgement - pending replacement today in IR, fever, leukocytosis.   Noted +2 maryjane. foot edema as per flow sheets. Skin: multiple pressure ulcers in left heel, left inner thigh, and right buttocks deep tissue injury; sacrum stage 4; lower lumbar spine, left malleolus, and right medial lower leg unstageable; left inner malleolus stage 2; and left lower buttocks stage 3 as per documentation.

## 2019-06-25 NOTE — PROGRESS NOTE ADULT - SUBJECTIVE AND OBJECTIVE BOX
---___---___---___---___---___---___ ---___---___---___---___---___---___---___---___---                  M E D I C A L   A T T E N D I N G   P R O G R E S S   N O T E  ---___---___---___---___---___---___ ---___---___---___---___---___---___---___---___---        ================================================    ++CHIEF COMPLAINT:   Patient is a 69y old  Female who presents with a chief complaint of PEG tube dislodgement, fever, leukocytosis (2019 11:26)      Gastrostomy malfunction being seen today awaiting ir placement of new tube    ---___---___---___---___---___---  PAST MEDICAL / Surgical  HISTORY:  PAST MEDICAL & SURGICAL HISTORY:  Type 2 diabetes mellitus  Dementia  DVT, lower extremity  CVA (cerebral vascular accident)  Fatty pancreas  PNA (pneumonia)  Pulmonary HTN  IGT (impaired glucose tolerance)  Ulcerative colitis  Acid reflux  Anxiety  Depression  Mouth sores  HLD (hyperlipidemia)  Asthma  S/P percutaneous endoscopic gastrostomy (PEG) tube placement: for dysphagia  Humeral head fracture  H/O: hysterectomy  H/O cataract extraction, left  History of knee replacement      ---___---___---___---___---___---  FAMILY HISTORY:   FAMILY HISTORY:  Family history of dementia (Grandparent)  Family history of colon cancer (Grandparent)        ---___---___---___---___---___---  ALLERGIES:   Allergies    ASA; dye contrast (Anaphylaxis)  aspirin (Short breath)  divalproex sodium (Other (Unknown))  Flowers and Plants (Short breath)  Haldol (Other (Unknown))  penicillin (Short breath; Rash)  sulfa drugs (Short breath; Rash)  vancomycin (Rash; Urticaria; Hives)  Xanax (Other (Unknown))    Intolerances        ---___---___---___---___---___---  MEDICATIONS:  MEDICATIONS  (STANDING):  buDESOnide  80 MICROgram(s)/formoterol 4.5 MICROgram(s) Inhaler 2 Puff(s) Inhalation two times a day  cefepime   IVPB 1000 milliGRAM(s) IV Intermittent every 8 hours  chlorhexidine 2% Cloths 1 Application(s) Topical daily  collagenase Ointment 1 Application(s) Topical daily  collagenase Ointment 1 Application(s) Topical daily  Dakins Solution - 1/4 Strength 1 Application(s) Topical two times a day  dextrose 5% + sodium chloride 0.9%. 1000 milliLiter(s) (50 mL/Hr) IV Continuous <Continuous>  dextrose 5%. 1000 milliLiter(s) (50 mL/Hr) IV Continuous <Continuous>  dextrose 50% Injectable 12.5 Gram(s) IV Push once  dextrose 50% Injectable 25 Gram(s) IV Push once  dextrose 50% Injectable 25 Gram(s) IV Push once  famotidine  IVPB 20 milliGRAM(s) IV Intermittent daily  insulin lispro (HumaLOG) corrective regimen sliding scale   SubCutaneous every 6 hours  LORazepam   Injectable 1 milliGRAM(s) IV Push once  Medical Marijuana Awake Oil 2 milliLiter(s) 2 milliLiter(s) Enteral Tube <User Schedule>  Medical Marijuana Calm Oil 2 milliLiter(s) 2 milliLiter(s) Enteral Tube <User Schedule>  Medical Marijuana Portia Oil 2 milliLiter(s) 2 milliLiter(s) Enteral Tube <User Schedule>  metroNIDAZOLE  IVPB 500 milliGRAM(s) IV Intermittent every 8 hours  metroNIDAZOLE  IVPB      tiotropium 18 MICROgram(s) Capsule 1 Capsule(s) Inhalation daily    MEDICATIONS  (PRN):  acetaminophen  Suppository .. 650 milliGRAM(s) Rectal every 6 hours PRN Temp greater or equal to 38C (100.4F), Mild Pain (1 - 3)  ALBUTerol    90 MICROgram(s) HFA Inhaler 1 Puff(s) Inhalation every 4 hours PRN Shortness of Breath and/or Wheezing  ALBUTerol/ipratropium for Nebulization 3 milliLiter(s) Nebulizer every 6 hours PRN Shortness of Breath and/or Wheezing  carboxymethylcellulose 0.5% (Preservative-Free) Ophthalmic Solution 1 Drop(s) Both EYES three times a day PRN dry eyes  dextrose 40% Gel 15 Gram(s) Oral once PRN Blood Glucose LESS THAN 70 milliGRAM(s)/deciliter  glucagon  Injectable 1 milliGRAM(s) IntraMuscular once PRN Glucose LESS THAN 70 milligrams/deciliter  nystatin Powder 1 Application(s) Topical three times a day PRN under breasts      ---___---___---___---___---___---  REVIEW OF SYSTEM:    unable to obtain  ---___---___---___---___---___---  VITAL SIGNS:  69y , CAPILLARY BLOOD GLUCOSE      POCT Blood Glucose.: 159 mg/dL (2019 12:53)    T(C): 37.4 (19 @ 11:50), Max: 38.4 (19 @ 13:49)  HR: 90 (19 @ 11:50) (90 - 116)  BP: 127/62 (19 @ 11:50) (91/59 - 146/75)  RR: 18 (19 @ 11:50) (16 - 24)  SpO2: 99% (19 @ 11:50) (99% - 100%)  ---___---___---___---___---___---  PHYSICAL EXAM:    GEN: A&O X 0 , NAD , comfortable  HEENT: NCAT, PERRL, MMM, hearing intact  Neck: supple , no JVD  CVS: S1S2 , regular , No M/R/G appreciated  PULM: CTA B/L,  no W/R/R appreciated  ABD.: soft. non tender, non distended,  bowel sounds present pen noted  Extrem: intact pulses , no edema  contractures noted   Derm: No rash , no ecchymoses stage 4 sacral decubitus         ---___---___---___---___---___---            LAB AND IMAGIN.9    16.6  )-----------( 692      ( 2019 09:13 )             29.4               -    139  |  99  |  36<H>  ----------------------------<  131<H>  3.8   |  26  |  0.56    Ca    8.9      2019 10:30    TPro  5.6<L>  /  Alb  2.8<L>  /  TBili  0.3  /  DBili  x   /  AST  9<L>  /  ALT  8<L>  /  AlkPhos  72  06-25    PT/INR - ( 2019 09:13 )   PT: 16.2 sec;   INR: 1.39 ratio         PTT - ( 2019 09:13 )  PTT:30.5 sec                            [All pertinent / recent Imaging reviewed]         ---___---___---___---___---___---___ ---___---___---___---___---                         A S S E S S M E N T   A N D   P L A N :      HEALTH ISSUES - PROBLEM Dx:  Type 2 diabetes mellitus without complication, without long-term current use of insulin: Type 2 diabetes mellitus without complication, without long-term current use of insulin continue meds  Pressure injury of skin of sacral region, unspecified injury stage: Pressure injury of skin of sacral region, unspecified injury stage continue current care  Chronic deep vein thrombosis (DVT) of right lower extremity, unspecified vein: Chronic deep vein thrombosis (DVT) of right lower extremity, unspecified vein on eliquis  Prophylactic measure: Prophylactic measure wound care  Dementia without behavioral disturbance, unspecified dementia type: Dementia without behavioral disturbance, unspecified dementia type supportive care   Uncomplicated asthma, unspecified asthma severity, unspecified whether persistent: Uncomplicated asthma, unspecified asthma severity, unspecified whether persistent  Sepsis, due to unspecified organism: Sepsis, due to unspecified organism  PEG tube malfunction: PEG tube malfunction awaiting placement with ir today                 -GI/DVT Prophylaxis.    --------------------------------------------  Case discussed with kendra her   Education given on   ___________________________  Thank you,  Deniz Bolden  [phone]

## 2019-06-25 NOTE — PROGRESS NOTE ADULT - SUBJECTIVE AND OBJECTIVE BOX
Interventional Radiology Procedure Note    Procedure:  gastrostomy tube replacement    Indication:  buried bumper    Operators: John    Anesthesia (type):  Lidocaine 2%    Contrast:  15 cc    EBL: minimal    Findings/Follow up Plan of Care:  Exchange of g-tube for 22 Nepali gastrostomy - tip in stomach.  May use immediately  - tip confirmed with fluoro.  Full report to follow.    Specimens Removed: none    Implants: 22 Nepali g-tube    Complications: none    Condition/Disposition:  To floors.    Please call Interventional Radiology x [phone] with any questions, concerns, or issues.

## 2019-06-25 NOTE — PROGRESS NOTE ADULT - ASSESSMENT
low grade temp and elevatyed WBC on IV abx, peg dislodged, small collection  for peg replacement today in IR  continue IV abx and follow fever/WBC

## 2019-06-25 NOTE — DIETITIAN INITIAL EVALUATION ADULT. - OTHER INFO
Pt unable to speak with history of dementia as per documentation -  at bedside provided information (pt and  known to me from previous admission). Pt PEG - dependent since 08/2018.  reports giving pt Glucerna 1.2 at 240 ml 4 x day every 4 hours (8 am, 12 pm, 4:30 pm, and 8:30 pm) provides 960 ml, 1152 kcal, 58 g protein, and 773 ml water (30 kcal/kg and 1.5 g/kg protein based on dosing weight 38.6 kg); states adding 40 ml of water before and after each feeding (total 1093 ml water/day).  reports giving pt liquid Prostat 2xday, multivitamin, and vitamin C for pressure ulcers; states pt also taking Ferrous Sulfate.   States pt taking Metformin PTA; HbA1c as per chart (06/14) 5.0% - indicates good BG control.  confirms pt with NKFA.  reports pt tolerates tube feedings well, denies pt with acute GI distress at this time.  denies pt with weight changes PTA, reports UBW between 84 - 88 pounds in the last 5 - 6 months due to pt receiving tube feedings; however states feeling concerned about pt losing weight while being NPO pending PEG replacement -  agreed to add one more can of Glucerna 1.2 divided between the 4 bolus feedings if pt loses weight, discussed with NP. Weight as per previous RD notes (02/27/2019) 78 pounds -> (03/26/2019) 89.8 pounds -> (06/13/2019) 100 pounds with edema. Weight as per documentation (06/24) 85 pounds -?accuracy due to fluid shifts, will continue to monitor.

## 2019-06-26 LAB
ANION GAP SERPL CALC-SCNC: 13 MMOL/L — SIGNIFICANT CHANGE UP (ref 5–17)
BASOPHILS # BLD AUTO: 0 K/UL — SIGNIFICANT CHANGE UP (ref 0–0.2)
BASOPHILS NFR BLD AUTO: 0.2 % — SIGNIFICANT CHANGE UP (ref 0–2)
BLD GP AB SCN SERPL QL: NEGATIVE — SIGNIFICANT CHANGE UP
BUN SERPL-MCNC: 34 MG/DL — HIGH (ref 7–23)
CALCIUM SERPL-MCNC: 9.1 MG/DL — SIGNIFICANT CHANGE UP (ref 8.4–10.5)
CHLORIDE SERPL-SCNC: 104 MMOL/L — SIGNIFICANT CHANGE UP (ref 96–108)
CO2 SERPL-SCNC: 24 MMOL/L — SIGNIFICANT CHANGE UP (ref 22–31)
CREAT SERPL-MCNC: 0.52 MG/DL — SIGNIFICANT CHANGE UP (ref 0.5–1.3)
EOSINOPHIL # BLD AUTO: 0.6 K/UL — HIGH (ref 0–0.5)
EOSINOPHIL NFR BLD AUTO: 4.5 % — SIGNIFICANT CHANGE UP (ref 0–6)
GLUCOSE BLDC GLUCOMTR-MCNC: 118 MG/DL — HIGH (ref 70–99)
GLUCOSE BLDC GLUCOMTR-MCNC: 138 MG/DL — HIGH (ref 70–99)
GLUCOSE BLDC GLUCOMTR-MCNC: 155 MG/DL — HIGH (ref 70–99)
GLUCOSE BLDC GLUCOMTR-MCNC: 68 MG/DL — LOW (ref 70–99)
GLUCOSE BLDC GLUCOMTR-MCNC: 74 MG/DL — SIGNIFICANT CHANGE UP (ref 70–99)
GLUCOSE SERPL-MCNC: 149 MG/DL — HIGH (ref 70–99)
HCT VFR BLD CALC: 21.5 % — LOW (ref 34.5–45)
HGB BLD-MCNC: 7.1 G/DL — LOW (ref 11.5–15.5)
LYMPHOCYTES # BLD AUTO: 0.8 K/UL — LOW (ref 1–3.3)
LYMPHOCYTES # BLD AUTO: 5.7 % — LOW (ref 13–44)
MCHC RBC-ENTMCNC: 30.5 PG — SIGNIFICANT CHANGE UP (ref 27–34)
MCHC RBC-ENTMCNC: 33 GM/DL — SIGNIFICANT CHANGE UP (ref 32–36)
MCV RBC AUTO: 92.2 FL — SIGNIFICANT CHANGE UP (ref 80–100)
MONOCYTES # BLD AUTO: 0.9 K/UL — SIGNIFICANT CHANGE UP (ref 0–0.9)
MONOCYTES NFR BLD AUTO: 6.9 % — SIGNIFICANT CHANGE UP (ref 2–14)
NEUTROPHILS # BLD AUTO: 11.2 K/UL — HIGH (ref 1.8–7.4)
NEUTROPHILS NFR BLD AUTO: 82.8 % — HIGH (ref 43–77)
PLATELET # BLD AUTO: 484 K/UL — HIGH (ref 150–400)
POTASSIUM SERPL-MCNC: 3.5 MMOL/L — SIGNIFICANT CHANGE UP (ref 3.5–5.3)
POTASSIUM SERPL-SCNC: 3.5 MMOL/L — SIGNIFICANT CHANGE UP (ref 3.5–5.3)
RBC # BLD: 2.33 M/UL — LOW (ref 3.8–5.2)
RBC # FLD: 15 % — HIGH (ref 10.3–14.5)
RH IG SCN BLD-IMP: NEGATIVE — SIGNIFICANT CHANGE UP
SODIUM SERPL-SCNC: 141 MMOL/L — SIGNIFICANT CHANGE UP (ref 135–145)
WBC # BLD: 13.6 K/UL — HIGH (ref 3.8–10.5)
WBC # FLD AUTO: 13.6 K/UL — HIGH (ref 3.8–10.5)

## 2019-06-26 PROCEDURE — 99232 SBSQ HOSP IP/OBS MODERATE 35: CPT

## 2019-06-26 RX ORDER — HEPARIN SODIUM 5000 [USP'U]/ML
5000 INJECTION INTRAVENOUS; SUBCUTANEOUS EVERY 12 HOURS
Refills: 0 | Status: DISCONTINUED | OUTPATIENT
Start: 2019-06-26 | End: 2019-07-13

## 2019-06-26 RX ORDER — SODIUM CHLORIDE 9 MG/ML
1000 INJECTION, SOLUTION INTRAVENOUS
Refills: 0 | Status: DISCONTINUED | OUTPATIENT
Start: 2019-06-26 | End: 2019-07-03

## 2019-06-26 RX ADMIN — Medication 1.5 MILLIGRAM(S): at 21:15

## 2019-06-26 RX ADMIN — Medication 100 MILLIGRAM(S): at 06:13

## 2019-06-26 RX ADMIN — ZINC SULFATE TAB 220 MG (50 MG ZINC EQUIVALENT) 220 MILLIGRAM(S): 220 (50 ZN) TAB at 13:08

## 2019-06-26 RX ADMIN — APIXABAN 5 MILLIGRAM(S): 2.5 TABLET, FILM COATED ORAL at 08:13

## 2019-06-26 RX ADMIN — QUETIAPINE FUMARATE 25 MILLIGRAM(S): 200 TABLET, FILM COATED ORAL at 21:15

## 2019-06-26 RX ADMIN — CEFEPIME 100 MILLIGRAM(S): 1 INJECTION, POWDER, FOR SOLUTION INTRAMUSCULAR; INTRAVENOUS at 16:59

## 2019-06-26 RX ADMIN — CHLORHEXIDINE GLUCONATE 1 APPLICATION(S): 213 SOLUTION TOPICAL at 13:49

## 2019-06-26 RX ADMIN — Medication 650 MILLIGRAM(S): at 06:12

## 2019-06-26 RX ADMIN — TIZANIDINE 4 MILLIGRAM(S): 4 TABLET ORAL at 10:04

## 2019-06-26 RX ADMIN — CEFEPIME 100 MILLIGRAM(S): 1 INJECTION, POWDER, FOR SOLUTION INTRAMUSCULAR; INTRAVENOUS at 06:10

## 2019-06-26 RX ADMIN — GABAPENTIN 300 MILLIGRAM(S): 400 CAPSULE ORAL at 10:04

## 2019-06-26 RX ADMIN — GABAPENTIN 300 MILLIGRAM(S): 400 CAPSULE ORAL at 21:14

## 2019-06-26 RX ADMIN — Medication 500 MILLIGRAM(S): at 13:01

## 2019-06-26 RX ADMIN — Medication 1: at 00:18

## 2019-06-26 RX ADMIN — Medication 100 MILLIGRAM(S): at 13:49

## 2019-06-26 RX ADMIN — ERGOCALCIFEROL 5000 UNIT(S): 1.25 CAPSULE ORAL at 16:59

## 2019-06-26 RX ADMIN — TIZANIDINE 4 MILLIGRAM(S): 4 TABLET ORAL at 21:14

## 2019-06-26 RX ADMIN — FAMOTIDINE 100 MILLIGRAM(S): 10 INJECTION INTRAVENOUS at 13:02

## 2019-06-26 RX ADMIN — Medication 1 TABLET(S): at 13:05

## 2019-06-26 RX ADMIN — TIOTROPIUM BROMIDE 1 CAPSULE(S): 18 CAPSULE ORAL; RESPIRATORY (INHALATION) at 13:06

## 2019-06-26 RX ADMIN — Medication 300 MILLIGRAM(S): at 13:06

## 2019-06-26 RX ADMIN — HEPARIN SODIUM 5000 UNIT(S): 5000 INJECTION INTRAVENOUS; SUBCUTANEOUS at 17:50

## 2019-06-26 NOTE — PROGRESS NOTE ADULT - SUBJECTIVE AND OBJECTIVE BOX
---___---___---___---___---___---___ ---___---___---___---___---___---___---___---___---                  M E D I C A L   A T T E N D I N G   P R O G R E S S   N O T E  ---___---___---___---___---___---___ ---___---___---___---___---___---___---___---___---        ================================================    ++CHIEF COMPLAINT:   Patient is a 69y old  Female who presents with a chief complaint of PEG tube dislodgement, fever, leukocytosis (2019 11:02)      sp peg  placemnet now with anemia and also having slight fevers    ---___---___---___---___---___---  PAST MEDICAL / Surgical  HISTORY:  PAST MEDICAL & SURGICAL HISTORY:  Type 2 diabetes mellitus  Dementia  DVT, lower extremity  CVA (cerebral vascular accident)  Fatty pancreas  PNA (pneumonia)  Pulmonary HTN  IGT (impaired glucose tolerance)  Ulcerative colitis  Acid reflux  Anxiety  Depression  Mouth sores  HLD (hyperlipidemia)  Asthma  S/P percutaneous endoscopic gastrostomy (PEG) tube placement: for dysphagia  Humeral head fracture  H/O: hysterectomy  H/O cataract extraction, left  History of knee replacement      ---___---___---___---___---___---  FAMILY HISTORY:   FAMILY HISTORY:  Family history of dementia (Grandparent)  Family history of colon cancer (Grandparent)        ---___---___---___---___---___---  ALLERGIES:   Allergies    ASA; dye contrast (Anaphylaxis)  aspirin (Short breath)  divalproex sodium (Other (Unknown))  Flowers and Plants (Short breath)  Haldol (Other (Unknown))  penicillin (Short breath; Rash)  sulfa drugs (Short breath; Rash)  vancomycin (Rash; Urticaria; Hives)  Xanax (Other (Unknown))    Intolerances        ---___---___---___---___---___---  MEDICATIONS:  MEDICATIONS  (STANDING):  ascorbic acid 500 milliGRAM(s) Oral daily  buDESOnide  80 MICROgram(s)/formoterol 4.5 MICROgram(s) Inhaler 2 Puff(s) Inhalation two times a day  cefepime   IVPB 1000 milliGRAM(s) IV Intermittent every 8 hours  chlorhexidine 2% Cloths 1 Application(s) Topical daily  clonazePAM  Tablet 1.5 milliGRAM(s) Oral at bedtime  dextrose 5% + sodium chloride 0.9%. 1000 milliLiter(s) (50 mL/Hr) IV Continuous <Continuous>  dextrose 5%. 1000 milliLiter(s) (50 mL/Hr) IV Continuous <Continuous>  dextrose 50% Injectable 12.5 Gram(s) IV Push once  dextrose 50% Injectable 25 Gram(s) IV Push once  dextrose 50% Injectable 25 Gram(s) IV Push once  ergocalciferol Drops 5000 Unit(s) Oral daily  famotidine  IVPB 20 milliGRAM(s) IV Intermittent daily  ferrous    sulfate Liquid 300 milliGRAM(s) Enteral Tube daily  gabapentin 300 milliGRAM(s) Oral two times a day  heparin  Injectable 5000 Unit(s) SubCutaneous every 12 hours  insulin lispro (HumaLOG) corrective regimen sliding scale   SubCutaneous every 6 hours  LORazepam   Injectable 1 milliGRAM(s) IV Push once  Medical Marijuana Awake Oil 2 milliLiter(s) 2 milliLiter(s) Enteral Tube <User Schedule>  Medical Marijuana Calm Oil 2 milliLiter(s) 2 milliLiter(s) Enteral Tube <User Schedule>  Medical Marijuana [place] Oil 2 milliLiter(s) 2 milliLiter(s) Enteral Tube <User Schedule>  metroNIDAZOLE  IVPB 500 milliGRAM(s) IV Intermittent every 8 hours  metroNIDAZOLE  IVPB      multivitamin 1 Tablet(s) Oral daily  QUEtiapine 25 milliGRAM(s) Oral daily  tiotropium 18 MICROgram(s) Capsule 1 Capsule(s) Inhalation daily  tiZANidine 4 milliGRAM(s) Oral <User Schedule>  zinc sulfate 220 milliGRAM(s) Oral daily    MEDICATIONS  (PRN):  acetaminophen  Suppository .. 650 milliGRAM(s) Rectal every 6 hours PRN Temp greater or equal to 38C (100.4F), Mild Pain (1 - 3)  ALBUTerol    90 MICROgram(s) HFA Inhaler 1 Puff(s) Inhalation every 4 hours PRN Shortness of Breath and/or Wheezing  ALBUTerol/ipratropium for Nebulization 3 milliLiter(s) Nebulizer every 6 hours PRN Shortness of Breath and/or Wheezing  carboxymethylcellulose 0.5% (Preservative-Free) Ophthalmic Solution 1 Drop(s) Both EYES three times a day PRN dry eyes  dextrose 40% Gel 15 Gram(s) Oral once PRN Blood Glucose LESS THAN 70 milliGRAM(s)/deciliter  glucagon  Injectable 1 milliGRAM(s) IntraMuscular once PRN Glucose LESS THAN 70 milligrams/deciliter  nystatin Powder 1 Application(s) Topical three times a day PRN under breasts      ---___---___---___---___---___---  REVIEW OF SYSTEM:    unable to obtain     ---___---___---___---___---___---  VITAL SIGNS:  69y , CAPILLARY BLOOD GLUCOSE      POCT Blood Glucose.: 138 mg/dL (2019 12:54)    T(C): 36.7 (19 @ 12:44), Max: 38.3 (19 @ 06:10)  HR: 95 (19 @ 12:44) (95 - 110)  BP: 118/64 (19 @ 12:44) (95/56 - 118/64)  RR: 19 (19 @ 12:44) (18 - 20)  SpO2: 100% (19 @ 12:44) (100% - 100%)  ---___---___---___---___---___---  PHYSICAL EXAM:    GEN: A&O X 0 , NAD , comfortable  HEENT: NCAT, PERRL, MMM, hearing intact  Neck: supple , no JVD  CVS: S1S2 , regular , No M/R/G appreciated  PULM: CTA B/L,  no W/R/R appreciated  ABD.: soft. non tender, non distended,  bowel sounds present peg noted   Extrem: intact pulses , no edema   Derm :sacral decubitus  noted and wound to left foot       ---___---___---___---___---___---            LAB AND IMAGIN.1    13.6  )-----------( 484      ( 2019 10:52 )             21.5                   141  |  104  |  34<H>  ----------------------------<  149<H>  3.5   |  24  |  0.52    Ca    9.1      2019 10:52    TPro  5.6<L>  /  Alb  2.8<L>  /  TBili  0.3  /  DBili  x   /  AST  9<L>  /  ALT  8<L>  /  AlkPhos  72  -                                [All pertinent / recent Imaging reviewed]         ---___---___---___---___---___---___ ---___---___---___---___---                         A S S E S S M E N T   A N D   P L A N :      HEALTH ISSUES - PROBLEM Dx:  Type 2 diabetes mellitus without complication, without long-term current use of insulin: Type 2 diabetes mellitus without complication, without long-term current use of insulin continue insulin   Pressure injury of skin of sacral region, unspecified injury stage: Pressure injury of skin of sacral region, unspecified injury stage continue wound care  Chronic deep vein thrombosis (DVT) of right lower extremity, unspecified vein: Chronic deep vein thrombosis (DVT) of right lower extremity, unspecified vein eliquis on hold heparin given   Prophylactic measure: Prophylactic measure continue heparin   Dementia without behavioral disturbance, unspecified dementia type: Dementia without behavioral disturbance, unspecified dementia type supportive care  Uncomplicated asthma, unspecified asthma severity, unspecified whether persistent: Uncomplicated asthma, unspecified asthma severity, unspecified whether persistent on budesnide    PEG tube malfunction: PEG tube malfunction peg placed  anemia transfuse 1 unit prbc              -GI/DVT Prophylaxis.    --------------------------------------------  Case discussed with   Education given on   ___________________________  Thank you,  Deniz Bolden  [phone]

## 2019-06-26 NOTE — PROGRESS NOTE ADULT - SUBJECTIVE AND OBJECTIVE BOX
MYRIAM WHITE:[id number],   69yFemale followed for:  ASA; dye contrast (Anaphylaxis)  aspirin (Short breath)  divalproex sodium (Other (Unknown))  Flowers and Plants (Short breath)  Haldol (Other (Unknown))  penicillin (Short breath; Rash)  sulfa drugs (Short breath; Rash)  vancomycin (Rash; Urticaria; Hives)  Xanax (Other (Unknown))    PAST MEDICAL & SURGICAL HISTORY:  Type 2 diabetes mellitus  Dementia  DVT, lower extremity  CVA (cerebral vascular accident)  Fatty pancreas  PNA (pneumonia)  Pulmonary HTN  IGT (impaired glucose tolerance)  Ulcerative colitis  Acid reflux  Anxiety  Depression  Mouth sores  HLD (hyperlipidemia)  Asthma  S/P percutaneous endoscopic gastrostomy (PEG) tube placement: for dysphagia  Humeral head fracture  H/O: hysterectomy  H/O cataract extraction, left  History of knee replacement    FAMILY HISTORY:  Family history of dementia (Grandparent)  Family history of colon cancer (Grandparent)    MEDICATIONS  (STANDING):  apixaban 5 milliGRAM(s) Oral every 12 hours  ascorbic acid 500 milliGRAM(s) Oral daily  buDESOnide  80 MICROgram(s)/formoterol 4.5 MICROgram(s) Inhaler 2 Puff(s) Inhalation two times a day  cefepime   IVPB 1000 milliGRAM(s) IV Intermittent every 8 hours  chlorhexidine 2% Cloths 1 Application(s) Topical daily  clonazePAM  Tablet 1.5 milliGRAM(s) Oral at bedtime  Dakins Solution - 1/4 Strength 1 Application(s) Topical two times a day  dextrose 5% + sodium chloride 0.9%. 1000 milliLiter(s) (50 mL/Hr) IV Continuous <Continuous>  dextrose 5%. 1000 milliLiter(s) (50 mL/Hr) IV Continuous <Continuous>  dextrose 50% Injectable 12.5 Gram(s) IV Push once  dextrose 50% Injectable 25 Gram(s) IV Push once  dextrose 50% Injectable 25 Gram(s) IV Push once  ergocalciferol Drops 5000 Unit(s) Oral daily  famotidine  IVPB 20 milliGRAM(s) IV Intermittent daily  ferrous    sulfate Liquid 300 milliGRAM(s) Enteral Tube daily  gabapentin 300 milliGRAM(s) Oral two times a day  insulin lispro (HumaLOG) corrective regimen sliding scale   SubCutaneous every 6 hours  LORazepam   Injectable 1 milliGRAM(s) IV Push once  Medical Marijuana Awake Oil 2 milliLiter(s) 2 milliLiter(s) Enteral Tube <User Schedule>  Medical Marijuana Calm Oil 2 milliLiter(s) 2 milliLiter(s) Enteral Tube <User Schedule>  Medical Marijuana [place] Oil 2 milliLiter(s) 2 milliLiter(s) Enteral Tube <User Schedule>  metroNIDAZOLE  IVPB 500 milliGRAM(s) IV Intermittent every 8 hours  metroNIDAZOLE  IVPB      multivitamin 1 Tablet(s) Oral daily  QUEtiapine 25 milliGRAM(s) Oral daily  tiotropium 18 MICROgram(s) Capsule 1 Capsule(s) Inhalation daily  tiZANidine 4 milliGRAM(s) Oral <User Schedule>  zinc sulfate 220 milliGRAM(s) Oral daily    MEDICATIONS  (PRN):  acetaminophen  Suppository .. 650 milliGRAM(s) Rectal every 6 hours PRN Temp greater or equal to 38C (100.4F), Mild Pain (1 - 3)  ALBUTerol    90 MICROgram(s) HFA Inhaler 1 Puff(s) Inhalation every 4 hours PRN Shortness of Breath and/or Wheezing  ALBUTerol/ipratropium for Nebulization 3 milliLiter(s) Nebulizer every 6 hours PRN Shortness of Breath and/or Wheezing  carboxymethylcellulose 0.5% (Preservative-Free) Ophthalmic Solution 1 Drop(s) Both EYES three times a day PRN dry eyes  dextrose 40% Gel 15 Gram(s) Oral once PRN Blood Glucose LESS THAN 70 milliGRAM(s)/deciliter  glucagon  Injectable 1 milliGRAM(s) IntraMuscular once PRN Glucose LESS THAN 70 milligrams/deciliter  nystatin Powder 1 Application(s) Topical three times a day PRN under breasts      Vital Signs Last 24 Hrs  T(C): 37.3 (26 Jun 2019 08:26), Max: 38.3 (26 Jun 2019 06:10)  T(F): 99.1 (26 Jun 2019 08:26), Max: 101 (26 Jun 2019 06:10)  HR: 110 (26 Jun 2019 06:10) (90 - 110)  BP: 95/56 (26 Jun 2019 06:10) (95/56 - 144/74)  BP(mean): --  RR: 19 (26 Jun 2019 06:10) (18 - 20)  SpO2: 100% (26 Jun 2019 06:10) (99% - 100%)  nc/at  s1s2  cta  soft, nt, nd no guarding or rebound  no c/c/e    CBC Full  -  ( 25 Jun 2019 13:59 )  WBC Count : 14.13 K/uL  RBC Count : 2.44 M/uL  Hemoglobin : 7.1 g/dL  Hematocrit : 23.5 %  Platelet Count - Automated : 483 K/uL  Mean Cell Volume : 96.3 fl  Mean Cell Hemoglobin : 29.1 pg  Mean Cell Hemoglobin Concentration : 30.2 gm/dL  Auto Neutrophil # : x  Auto Lymphocyte # : x  Auto Monocyte # : x  Auto Eosinophil # : x  Auto Basophil # : x  Auto Neutrophil % : x  Auto Lymphocyte % : x  Auto Monocyte % : x  Auto Eosinophil % : x  Auto Basophil % : x    06-25    139  |  99  |  36<H>  ----------------------------<  131<H>  3.8   |  26  |  0.56    Ca    8.9      25 Jun 2019 10:30    TPro  5.6<L>  /  Alb  2.8<L>  /  TBili  0.3  /  DBili  x   /  AST  9<L>  /  ALT  8<L>  /  AlkPhos  72  06-25

## 2019-06-26 NOTE — CHART NOTE - NSCHARTNOTEFT_GEN_A_CORE
D/w attending Dr. Bolden re: Hgb 7.1 today (same as yesterday). Pt without noted overt bleeding, on Eliquis for chronic DVT.   Per attending: stop Eliquis. Start Heparin 5000 units SC q12h. Transfuse 1 unit PRBC.        Ritu Morataya NP Medicine D/w attending Dr. Bolden re: Hgb 7.1 today (same as yesterday). Pt without noted overt bleeding, on Eliquis for chronic DVT. Pt also febrile this morning without new symptoms per .   Per attending: stop Eliquis. Start Heparin 5000 units SC q12h. Transfuse 1 unit PRBC.  -c/w abx  -Blood cultures x 2        Ritu Morataya NP Medicine    Above discussed with pt's  at bedside, who agrees with plan.    Ritu Morataya NP Medicine [phone]

## 2019-06-26 NOTE — PROGRESS NOTE ADULT - SUBJECTIVE AND OBJECTIVE BOX
infectious diseases progress note:    Patient is a 69y old  Female who presents with a chief complaint of PEG tube dislodgement, fever, leukocytosis (25 Jun 2019 14:57)        Gastrostomy malfunction             Allergies    ASA; dye contrast (Anaphylaxis)  aspirin (Short breath)  divalproex sodium (Other (Unknown))  Flowers and Plants (Short breath)  Haldol (Other (Unknown))  penicillin (Short breath; Rash)  sulfa drugs (Short breath; Rash)  vancomycin (Rash; Urticaria; Hives)  Xanax (Other (Unknown))    Intolerances        ANTIBIOTICS/RELEVANT:  antimicrobials  cefepime   IVPB 1000 milliGRAM(s) IV Intermittent every 8 hours  metroNIDAZOLE  IVPB 500 milliGRAM(s) IV Intermittent every 8 hours  metroNIDAZOLE  IVPB        immunologic:    OTHER:  acetaminophen  Suppository .. 650 milliGRAM(s) Rectal every 6 hours PRN  ALBUTerol    90 MICROgram(s) HFA Inhaler 1 Puff(s) Inhalation every 4 hours PRN  ALBUTerol/ipratropium for Nebulization 3 milliLiter(s) Nebulizer every 6 hours PRN  apixaban 5 milliGRAM(s) Oral every 12 hours  ascorbic acid 500 milliGRAM(s) Oral daily  buDESOnide  80 MICROgram(s)/formoterol 4.5 MICROgram(s) Inhaler 2 Puff(s) Inhalation two times a day  carboxymethylcellulose 0.5% (Preservative-Free) Ophthalmic Solution 1 Drop(s) Both EYES three times a day PRN  chlorhexidine 2% Cloths 1 Application(s) Topical daily  clonazePAM  Tablet 1.5 milliGRAM(s) Oral at bedtime  collagenase Ointment 1 Application(s) Topical daily  collagenase Ointment 1 Application(s) Topical daily  Dakins Solution - 1/4 Strength 1 Application(s) Topical two times a day  dextrose 40% Gel 15 Gram(s) Oral once PRN  dextrose 5% + sodium chloride 0.9%. 1000 milliLiter(s) IV Continuous <Continuous>  dextrose 5%. 1000 milliLiter(s) IV Continuous <Continuous>  dextrose 50% Injectable 12.5 Gram(s) IV Push once  dextrose 50% Injectable 25 Gram(s) IV Push once  dextrose 50% Injectable 25 Gram(s) IV Push once  ergocalciferol Drops 5000 Unit(s) Oral daily  famotidine  IVPB 20 milliGRAM(s) IV Intermittent daily  ferrous    sulfate Liquid 300 milliGRAM(s) Enteral Tube daily  gabapentin 300 milliGRAM(s) Oral two times a day  glucagon  Injectable 1 milliGRAM(s) IntraMuscular once PRN  insulin lispro (HumaLOG) corrective regimen sliding scale   SubCutaneous every 6 hours  LORazepam   Injectable 1 milliGRAM(s) IV Push once  Medical Marijuana Awake Oil 2 milliLiter(s) 2 milliLiter(s) Enteral Tube <User Schedule>  Medical Marijuana Calm Oil 2 milliLiter(s) 2 milliLiter(s) Enteral Tube <User Schedule>  Medical Marijuana [place] Oil 2 milliLiter(s) 2 milliLiter(s) Enteral Tube <User Schedule>  multivitamin 1 Tablet(s) Oral daily  nystatin Powder 1 Application(s) Topical three times a day PRN  QUEtiapine 25 milliGRAM(s) Oral daily  tiotropium 18 MICROgram(s) Capsule 1 Capsule(s) Inhalation daily  tiZANidine 4 milliGRAM(s) Oral <User Schedule>  zinc sulfate 220 milliGRAM(s) Oral daily      Objective:  Vital Signs Last 24 Hrs  T(C): 38.3 (26 Jun 2019 06:10), Max: 38.3 (26 Jun 2019 06:10)  T(F): 101 (26 Jun 2019 06:10), Max: 101 (26 Jun 2019 06:10)  HR: 110 (26 Jun 2019 06:10) (90 - 110)  BP: 95/56 (26 Jun 2019 06:10) (95/56 - 144/74)  BP(mean): --  RR: 19 (26 Jun 2019 06:10) (18 - 20)  SpO2: 100% (26 Jun 2019 06:10) (99% - 100%)     s              LABS:                        7.1    14.13 )-----------( 483      ( 25 Jun 2019 13:59 )             23.5     06-25    139  |  99  |  36<H>  ----------------------------<  131<H>  3.8   |  26  |  0.56    Ca    8.9      25 Jun 2019 10:30    TPro  5.6<L>  /  Alb  2.8<L>  /  TBili  0.3  /  DBili  x   /  AST  9<L>  /  ALT  8<L>  /  AlkPhos  72  06-25    PT/INR - ( 24 Jun 2019 09:13 )   PT: 16.2 sec;   INR: 1.39 ratio         PTT - ( 24 Jun 2019 09:13 )  PTT:30.5 sec        MICROBIOLOGY:    RECENT CULTURES:  06-24 @ 17:30 .Blood                No growth to date.          RESPIRATORY CULTURES:              RADIOLOGY & ADDITIONAL STUDIES:        Pager [phone]  After 5 pm/weekends or if no response :[phone]

## 2019-06-26 NOTE — PROGRESS NOTE ADULT - ASSESSMENT
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     most l;ikely this event appears to be related  to peg being dislodged      she probably has a chronic mrsa infection of spacer but I think we can hold vanco for now.  discussed with gi and family   currently on cefepime and flagyl  ( unclear if she is really allergic to meropenem.)  still with low grade fevers: peg replaced yesterday  if she deteriorates; add back banco to cover the mrsa in her hip  prognosis remains dismal

## 2019-06-27 LAB
ANION GAP SERPL CALC-SCNC: 12 MMOL/L — SIGNIFICANT CHANGE UP (ref 5–17)
BUN SERPL-MCNC: 36 MG/DL — HIGH (ref 7–23)
CALCIUM SERPL-MCNC: 8.8 MG/DL — SIGNIFICANT CHANGE UP (ref 8.4–10.5)
CHLORIDE SERPL-SCNC: 102 MMOL/L — SIGNIFICANT CHANGE UP (ref 96–108)
CO2 SERPL-SCNC: 24 MMOL/L — SIGNIFICANT CHANGE UP (ref 22–31)
CREAT SERPL-MCNC: 0.55 MG/DL — SIGNIFICANT CHANGE UP (ref 0.5–1.3)
GLUCOSE BLDC GLUCOMTR-MCNC: 103 MG/DL — HIGH (ref 70–99)
GLUCOSE BLDC GLUCOMTR-MCNC: 114 MG/DL — HIGH (ref 70–99)
GLUCOSE BLDC GLUCOMTR-MCNC: 127 MG/DL — HIGH (ref 70–99)
GLUCOSE BLDC GLUCOMTR-MCNC: 141 MG/DL — HIGH (ref 70–99)
GLUCOSE BLDC GLUCOMTR-MCNC: 64 MG/DL — LOW (ref 70–99)
GLUCOSE BLDC GLUCOMTR-MCNC: 65 MG/DL — LOW (ref 70–99)
GLUCOSE BLDC GLUCOMTR-MCNC: 83 MG/DL — SIGNIFICANT CHANGE UP (ref 70–99)
GLUCOSE SERPL-MCNC: 142 MG/DL — HIGH (ref 70–99)
HCT VFR BLD CALC: 27.1 % — LOW (ref 34.5–45)
HGB BLD-MCNC: 8.6 G/DL — LOW (ref 11.5–15.5)
MCHC RBC-ENTMCNC: 29.8 PG — SIGNIFICANT CHANGE UP (ref 27–34)
MCHC RBC-ENTMCNC: 31.7 GM/DL — LOW (ref 32–36)
MCV RBC AUTO: 93.8 FL — SIGNIFICANT CHANGE UP (ref 80–100)
PLATELET # BLD AUTO: 403 K/UL — HIGH (ref 150–400)
POTASSIUM SERPL-MCNC: 3.8 MMOL/L — SIGNIFICANT CHANGE UP (ref 3.5–5.3)
POTASSIUM SERPL-SCNC: 3.8 MMOL/L — SIGNIFICANT CHANGE UP (ref 3.5–5.3)
RBC # BLD: 2.89 M/UL — LOW (ref 3.8–5.2)
RBC # FLD: 15.6 % — HIGH (ref 10.3–14.5)
SODIUM SERPL-SCNC: 138 MMOL/L — SIGNIFICANT CHANGE UP (ref 135–145)
WBC # BLD: 10.98 K/UL — HIGH (ref 3.8–10.5)
WBC # FLD AUTO: 10.98 K/UL — HIGH (ref 3.8–10.5)

## 2019-06-27 PROCEDURE — 71045 X-RAY EXAM CHEST 1 VIEW: CPT | Mod: 26

## 2019-06-27 PROCEDURE — 99232 SBSQ HOSP IP/OBS MODERATE 35: CPT

## 2019-06-27 RX ORDER — METRONIDAZOLE 500 MG
500 TABLET ORAL EVERY 8 HOURS
Refills: 0 | Status: COMPLETED | OUTPATIENT
Start: 2019-06-27 | End: 2019-06-30

## 2019-06-27 RX ORDER — DEXTROSE 50 % IN WATER 50 %
12.5 SYRINGE (ML) INTRAVENOUS ONCE
Refills: 0 | Status: COMPLETED | OUTPATIENT
Start: 2019-06-27 | End: 2019-06-27

## 2019-06-27 RX ORDER — DEXTROSE 50 % IN WATER 50 %
25 SYRINGE (ML) INTRAVENOUS ONCE
Refills: 0 | Status: COMPLETED | OUTPATIENT
Start: 2019-06-27 | End: 2019-06-27

## 2019-06-27 RX ORDER — ACETAMINOPHEN 500 MG
650 TABLET ORAL ONCE
Refills: 0 | Status: COMPLETED | OUTPATIENT
Start: 2019-06-27 | End: 2019-06-28

## 2019-06-27 RX ORDER — SODIUM CHLORIDE 9 MG/ML
1000 INJECTION, SOLUTION INTRAVENOUS
Refills: 0 | Status: DISCONTINUED | OUTPATIENT
Start: 2019-06-27 | End: 2019-07-08

## 2019-06-27 RX ADMIN — FAMOTIDINE 100 MILLIGRAM(S): 10 INJECTION INTRAVENOUS at 13:00

## 2019-06-27 RX ADMIN — CEFEPIME 100 MILLIGRAM(S): 1 INJECTION, POWDER, FOR SOLUTION INTRAMUSCULAR; INTRAVENOUS at 16:58

## 2019-06-27 RX ADMIN — TIZANIDINE 4 MILLIGRAM(S): 4 TABLET ORAL at 09:08

## 2019-06-27 RX ADMIN — Medication 1 TABLET(S): at 12:49

## 2019-06-27 RX ADMIN — Medication 12.5 GRAM(S): at 01:21

## 2019-06-27 RX ADMIN — Medication 500 MILLIGRAM(S): at 21:15

## 2019-06-27 RX ADMIN — Medication 100 MILLIGRAM(S): at 09:11

## 2019-06-27 RX ADMIN — HEPARIN SODIUM 5000 UNIT(S): 5000 INJECTION INTRAVENOUS; SUBCUTANEOUS at 06:53

## 2019-06-27 RX ADMIN — CHLORHEXIDINE GLUCONATE 1 APPLICATION(S): 213 SOLUTION TOPICAL at 12:49

## 2019-06-27 RX ADMIN — SODIUM CHLORIDE 50 MILLILITER(S): 9 INJECTION, SOLUTION INTRAVENOUS at 02:20

## 2019-06-27 RX ADMIN — Medication 100 MILLIGRAM(S): at 00:38

## 2019-06-27 RX ADMIN — TIZANIDINE 4 MILLIGRAM(S): 4 TABLET ORAL at 21:16

## 2019-06-27 RX ADMIN — SODIUM CHLORIDE 50 MILLILITER(S): 9 INJECTION, SOLUTION INTRAVENOUS at 09:10

## 2019-06-27 RX ADMIN — Medication 500 MILLIGRAM(S): at 16:58

## 2019-06-27 RX ADMIN — GABAPENTIN 300 MILLIGRAM(S): 400 CAPSULE ORAL at 09:08

## 2019-06-27 RX ADMIN — CEFEPIME 100 MILLIGRAM(S): 1 INJECTION, POWDER, FOR SOLUTION INTRAMUSCULAR; INTRAVENOUS at 00:15

## 2019-06-27 RX ADMIN — Medication 300 MILLIGRAM(S): at 12:49

## 2019-06-27 RX ADMIN — ZINC SULFATE TAB 220 MG (50 MG ZINC EQUIVALENT) 220 MILLIGRAM(S): 220 (50 ZN) TAB at 12:49

## 2019-06-27 RX ADMIN — Medication 500 MILLIGRAM(S): at 12:49

## 2019-06-27 RX ADMIN — GABAPENTIN 300 MILLIGRAM(S): 400 CAPSULE ORAL at 21:15

## 2019-06-27 RX ADMIN — BUDESONIDE AND FORMOTEROL FUMARATE DIHYDRATE 2 PUFF(S): 160; 4.5 AEROSOL RESPIRATORY (INHALATION) at 06:50

## 2019-06-27 RX ADMIN — QUETIAPINE FUMARATE 25 MILLIGRAM(S): 200 TABLET, FILM COATED ORAL at 21:15

## 2019-06-27 RX ADMIN — ERGOCALCIFEROL 5000 UNIT(S): 1.25 CAPSULE ORAL at 12:49

## 2019-06-27 RX ADMIN — CEFEPIME 100 MILLIGRAM(S): 1 INJECTION, POWDER, FOR SOLUTION INTRAMUSCULAR; INTRAVENOUS at 09:07

## 2019-06-27 RX ADMIN — Medication 260 MILLIGRAM(S): at 21:47

## 2019-06-27 RX ADMIN — HEPARIN SODIUM 5000 UNIT(S): 5000 INJECTION INTRAVENOUS; SUBCUTANEOUS at 17:11

## 2019-06-27 RX ADMIN — NYSTATIN CREAM 1 APPLICATION(S): 100000 CREAM TOPICAL at 16:58

## 2019-06-27 RX ADMIN — Medication 1.5 MILLIGRAM(S): at 21:15

## 2019-06-27 NOTE — PROGRESS NOTE ADULT - SUBJECTIVE AND OBJECTIVE BOX
---___---___---___---___---___---___ ---___---___---___---___---___---___---___---___---                  M E D I C A L   A T T E N D I N G   P R O G R E S S   N O T E  ---___---___---___---___---___---___ ---___---___---___---___---___---___---___---___---        ================================================    ++CHIEF COMPLAINT:   Patient is a 69y old  Female who presents with a chief complaint of PEG tube dislodgement, fever, leukocytosis (2019 11:09)      Gastrostomy malfunction has been better     ---___---___---___---___---___---  PAST MEDICAL / Surgical  HISTORY:  PAST MEDICAL & SURGICAL HISTORY:  Type 2 diabetes mellitus  Dementia  DVT, lower extremity  CVA (cerebral vascular accident)  Fatty pancreas  PNA (pneumonia)  Pulmonary HTN  IGT (impaired glucose tolerance)  Ulcerative colitis  Acid reflux  Anxiety  Depression  Mouth sores  HLD (hyperlipidemia)  Asthma  S/P percutaneous endoscopic gastrostomy (PEG) tube placement: for dysphagia  Humeral head fracture  H/O: hysterectomy  H/O cataract extraction, left  History of knee replacement      ---___---___---___---___---___---  FAMILY HISTORY:   FAMILY HISTORY:  Family history of dementia (Grandparent)  Family history of colon cancer (Grandparent)        ---___---___---___---___---___---  ALLERGIES:   Allergies    ASA; dye contrast (Anaphylaxis)  aspirin (Short breath)  divalproex sodium (Other (Unknown))  Flowers and Plants (Short breath)  Haldol (Other (Unknown))  penicillin (Short breath; Rash)  sulfa drugs (Short breath; Rash)  vancomycin (Rash; Urticaria; Hives)  Xanax (Other (Unknown))    Intolerances        ---___---___---___---___---___---  MEDICATIONS:  MEDICATIONS  (STANDING):  ascorbic acid 500 milliGRAM(s) Oral daily  buDESOnide  80 MICROgram(s)/formoterol 4.5 MICROgram(s) Inhaler 2 Puff(s) Inhalation two times a day  cefepime   IVPB 1000 milliGRAM(s) IV Intermittent every 8 hours  chlorhexidine 2% Cloths 1 Application(s) Topical daily  clonazePAM  Tablet 1.5 milliGRAM(s) Oral at bedtime  dextrose 5% + sodium chloride 0.9%. 1000 milliLiter(s) (50 mL/Hr) IV Continuous <Continuous>  dextrose 5% + sodium chloride 0.9%. 1000 milliLiter(s) (50 mL/Hr) IV Continuous <Continuous>  dextrose 5%. 1000 milliLiter(s) (50 mL/Hr) IV Continuous <Continuous>  dextrose 50% Injectable 12.5 Gram(s) IV Push once  dextrose 50% Injectable 25 Gram(s) IV Push once  dextrose 50% Injectable 25 Gram(s) IV Push once  ergocalciferol Drops 5000 Unit(s) Oral daily  famotidine  IVPB 20 milliGRAM(s) IV Intermittent daily  ferrous    sulfate Liquid 300 milliGRAM(s) Enteral Tube daily  gabapentin 300 milliGRAM(s) Oral two times a day  heparin  Injectable 5000 Unit(s) SubCutaneous every 12 hours  insulin lispro (HumaLOG) corrective regimen sliding scale   SubCutaneous every 6 hours  Medical Marijuana [place] Oil 2 milliLiter(s) 2 milliLiter(s) Enteral Tube <User Schedule>  Medical Marijuana Calm Oil 2 milliLiter(s) 2 milliLiter(s) Enteral Tube <User Schedule>  Medical Marijuana [place] Oil 2 milliLiter(s) 2 milliLiter(s) Enteral Tube <User Schedule>  metroNIDAZOLE    Tablet 500 milliGRAM(s) Oral every 8 hours  multivitamin 1 Tablet(s) Oral daily  QUEtiapine 25 milliGRAM(s) Oral daily  tiotropium 18 MICROgram(s) Capsule 1 Capsule(s) Inhalation daily  tiZANidine 4 milliGRAM(s) Oral <User Schedule>  zinc sulfate 220 milliGRAM(s) Oral daily    MEDICATIONS  (PRN):  acetaminophen  Suppository .. 650 milliGRAM(s) Rectal every 6 hours PRN Temp greater or equal to 38C (100.4F), Mild Pain (1 - 3)  ALBUTerol    90 MICROgram(s) HFA Inhaler 1 Puff(s) Inhalation every 4 hours PRN Shortness of Breath and/or Wheezing  ALBUTerol/ipratropium for Nebulization 3 milliLiter(s) Nebulizer every 6 hours PRN Shortness of Breath and/or Wheezing  carboxymethylcellulose 0.5% (Preservative-Free) Ophthalmic Solution 1 Drop(s) Both EYES three times a day PRN dry eyes  dextrose 40% Gel 15 Gram(s) Oral once PRN Blood Glucose LESS THAN 70 milliGRAM(s)/deciliter  glucagon  Injectable 1 milliGRAM(s) IntraMuscular once PRN Glucose LESS THAN 70 milligrams/deciliter  nystatin Powder 1 Application(s) Topical three times a day PRN under breasts      ---___---___---___---___---___---  REVIEW OF SYSTEM:    unable to obtain     ---___---___---___---___---___---  VITAL SIGNS:  69y , CAPILLARY BLOOD GLUCOSE      POCT Blood Glucose.: 127 mg/dL (2019 12:46)    T(C): 37.4 (19 @ 14:46), Max: 37.4 (19 @ 14:46)  HR: 111 (19 @ 14:46) (94 - 111)  BP: 112/67 (19 @ 14:46) (101/61 - 131/67)  RR: 19 (19 @ 14:46) (19 - 20)  SpO2: 99% (19 @ 14:46) (99% - 100%)  ---___---___---___---___---___---  PHYSICAL EXAM:    GEN: A&O X 0 , NAD , comfortable  HEENT: NCAT, PERRL, MMM, hearing intact  Neck: supple , no JVD  CVS: S1S2 , regular , No M/R/G appreciated  PULM: CTA B/L,  no W/R/R appreciated  ABD.: soft. non tender, non distended,  bowel sounds present peg noted   Extrem: intact pulses , no edema   Derm: No rash , no ecchymoses        ---___---___---___---___---___---            LAB AND IMAGIN.6    10.98 )-----------( 403      ( 2019 09:51 )             27.1               -    138  |  102  |  36<H>  ----------------------------<  142<H>  3.8   |  24  |  0.55    Ca    8.8      2019 07:22                                  [All pertinent / recent Imaging reviewed]         ---___---___---___---___---___---___ ---___---___---___---___---                         A S S E S S M E N T   A N D   P L A N :      HEALTH ISSUES - PROBLEM Dx:  Type 2 diabetes mellitus without complication, without long-term current use of insulin:   Make sure you get your HgA1c checked every three months.  I   Pressure injury of skin of sacral region, unspecified injury stage: Continue with wound care as instructed.   Maintain adequate nutrition, frequent repositioning , offloading with Zfloat boots.  Follow-up with your primary care physician and Wound Care Specialist within 1 week. Call for appointment.    Chronic deep vein thrombosis (DVT) of right lower extremity, unspecified vein:         Prophylactic measure: Prophylactic measure  Dementia without behavioral disturbance, unspecified dementia type: Continue with medications as prescribed        Uncomplicated asthma, unspecified asthma severity, unspecified whether persistent: Stable.       Sepsis, due to unspecified organism: Take all antibiotics       PEG tube malfunction: PEG tube malfunction stable                -GI/DVT Prophylaxis.    --------------------------------------------  Case discussed with   Education given on   ___________________________  Thank you,  Deniz Bolden  [phone]

## 2019-06-27 NOTE — DISCHARGE NOTE PROVIDER - NSDCFUADDAPPT_GEN_ALL_CORE_FT
Follow-up with your primary care physician within 1 week. Call for appointment. f/u with [facility]

## 2019-06-27 NOTE — DISCHARGE NOTE PROVIDER - PROVIDER TOKENS
PROVIDER:[TOKEN:[[id number]],FOLLOWUP:[1 week]],PROVIDER:[TOKEN:[[id number]],FOLLOWUP:[1 week]]

## 2019-06-27 NOTE — CHART NOTE - NSCHARTNOTEFT_GEN_A_CORE
CC: temperature 101.9F    HPI:  Notified by RN patient with temperature of F. Patient seen and examined  by me at bedside.   Patient is alert, NAD.  Patient denied headache, dizziness, chest pain, shortness of breath, cough, rhinorrhea, N/V/D, urinary symptoms, or abdominal pain.      ROS:  CONSTITUTIONAL:  No fever, chills, rigors  CARDIOVASCULAR:  No chest pain or palpitations  RESPIRATORY:   No SOB, cough, dyspnea on exertion.  No wheezing  GASTROINTESTINAL:  No abd pain, N/V, diarrhea/constipation  EXTREMITIES:  No swelling or joint pain  GENITOURINARY:  No burning on urination, increased frequency or urgency.  No flank pain  NEUROLOGIC:  No HA, visual disturbances  SKIN: No rashes      PAST MEDICAL & SURGICAL HISTORY:  Type 2 diabetes mellitus  Dementia  DVT, lower extremity  CVA (cerebral vascular accident)  Fatty pancreas  PNA (pneumonia)  Pulmonary HTN  IGT (impaired glucose tolerance)  Ulcerative colitis  Acid reflux  Anxiety  Depression  Mouth sores  HLD (hyperlipidemia)  Asthma  S/P percutaneous endoscopic gastrostomy (PEG) tube placement: for dysphagia  Humeral head fracture  H/O: hysterectomy  H/O cataract extraction, left  History of knee replacement            VITAL SIGNS:  T(C): 38.8 (06-27-19 @ 20:48), Max: 38.8 (06-27-19 @ 20:48)  HR: 112 (06-27-19 @ 20:48) (94 - 112)  BP: 128/76 (06-27-19 @ 20:48) (112/67 - 131/67)  RR: 20 (06-27-19 @ 20:48) (18 - 20)  SpO2: 98% (06-27-19 @ 20:48) (94% - 99%)      Physical Exam:  General: WN/WD NAD, AOx3, nontoxic appearing  Head:  NC/AT  CV: RRR, S1S2   Respiratory: CTA B/L, nonlabored. No rales/rhonchi/wheezes.  Abdominal: (+) bowel sounds x4. Soft, NT, ND, no palpable mass, no guarding, or rebound tenderness  Genitourinary: ? Blackman   MSK: No BLLE edema, + peripheral pulses, FROM all 4 extremity  Skin: (+) warm, dry   Psych: Appropriate affect       LABORATORY:                        8.6    10.98 )-----------( 403      ( 27 Jun 2019 09:51 )             27.1       06-27    138  |  102  |  36<H>  ----------------------------<  142<H>  3.8   |  24  |  0.55    Ca    8.8      27 Jun 2019 07:22          Culture - Blood (collected 26 Jun 2019 20:15)  Source: .Blood  Preliminary Report (27 Jun 2019 21:01):    No growth to date.    Culture - Blood (collected 26 Jun 2019 20:15)  Source: .Blood  Preliminary Report (27 Jun 2019 21:01):    No growth to date.                RADIOLOGY:  < from: Xray Chest 1 View AP/PA (06.24.19 @ 14:17) >    The heart is enlarged. Left lingular pneumonia cannot be ruled out   entirely. A lateral radiograph should be obtained. Degenerative changes   of the thoracic spine.      < from: CT Abdomen and Pelvis w/ Oral Cont (06.24.19 @ 12:16) >    Malpositioned PEG tube with balloon and tip in the left ventral abdominal   wall with small surrounding loculated fluid. No bowel obstruction.    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 ED today with PEG tube being dislodged with bleeding at the site, found to have fever and leukocytosis on admission.  thinks she must have pulled it out this morning. He says otherwise she was in her usual state of health at home. He says she has had some intermittent low grade fevers at home, which usually respond to Tylenol.   He says her Blackman was due to be changed early July 2019.   Last dose of Eliquis was 6/23/19 8pm.   In ED patient received 1LNS and cefepime. (24 Jun 2019 15:39)      Patient now with temperature of    #Recurrent Fever  -Ofirmev 650mg IV x1  -Cooling measures prn for pyrexia  -BCx x2 collected 6/26 without growth  -UA/UCx ordered  -CXR (6/24) with question of pneumonia; repeat CXR ordered  -c/w cefepime and flagyl  -Consider CT chest if indicated  -ID is following patient  -Will continue to closely monitor patient/vitals   -Will endorse to primary team in SARAH Maria PA-C  Dept of Medicine  [id number] CC: temperature 101.9F    HPI:  Notified by RN patient with temperature of 101.9F. Patient seen and examined  by me at bedside, unable to assess ROS because patient is nonverbal. Aide present at bedside.       ROS:  Unable to assess because patient is nonverbal      PAST MEDICAL & SURGICAL HISTORY:  Type 2 diabetes mellitus  Dementia  DVT, lower extremity  CVA (cerebral vascular accident)  Fatty pancreas  PNA (pneumonia)  Pulmonary HTN  IGT (impaired glucose tolerance)  Ulcerative colitis  Acid reflux  Anxiety  Depression  Mouth sores  HLD (hyperlipidemia)  Asthma  S/P percutaneous endoscopic gastrostomy (PEG) tube placement: for dysphagia  Humeral head fracture  H/O: hysterectomy  H/O cataract extraction, left  History of knee replacement          VITAL SIGNS:  T(C): 38.8 (06-27-19 @ 20:48), Max: 38.8 (06-27-19 @ 20:48)  HR: 112 (06-27-19 @ 20:48) (94 - 112)  BP: 128/76 (06-27-19 @ 20:48) (112/67 - 131/67)  RR: 20 (06-27-19 @ 20:48) (18 - 20)  SpO2: 98% (06-27-19 @ 20:48) (94% - 99%)      Physical Exam:  General: Thin, chronically ill-appearing female laying in bed, contracted, NAD, nonverbal  Head:  NC/AT  CV: (+) tachycardic, S1S2   Respiratory: CTA B/L anteriorly, nonlabored  Abdominal: (+) bowel sounds x4. Soft, NT, ND, no palpable mass, no guarding, or rebound tenderness  Genitourinary: (+) Blackman with dark yellow urine  MSK: (+) contracted lower extremities.  (+) peripheral pulses  Skin: (+) warm, dry         LABORATORY:                        8.6    10.98 )-----------( 403      ( 27 Jun 2019 09:51 )             27.1       06-27    138  |  102  |  36<H>  ----------------------------<  142<H>  3.8   |  24  |  0.55    Ca    8.8      27 Jun 2019 07:22          Culture - Blood (collected 26 Jun 2019 20:15)  Source: .Blood  Preliminary Report (27 Jun 2019 21:01):    No growth to date.    Culture - Blood (collected 26 Jun 2019 20:15)  Source: .Blood  Preliminary Report (27 Jun 2019 21:01):    No growth to date.                RADIOLOGY:  < from: Xray Chest 1 View AP/PA (06.24.19 @ 14:17) >  The heart is enlarged. Left lingular pneumonia cannot be ruled out   entirely. A lateral radiograph should be obtained. Degenerative changes   of the thoracic spine.      < from: CT Abdomen and Pelvis w/ Oral Cont (06.24.19 @ 12:16) >  Malpositioned PEG tube with balloon and tip in the left ventral abdominal   wall with small surrounding loculated fluid. No bowel obstruction.  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  thinks she must have pulled it out this morning. He says otherwise she was in her usual state of health at home. He says she has had some intermittent low grade fevers at home, which usually respond to Tylenol.   He says her Blackman was due to be changed early July 2019.   Last dose of Eliquis was 6/23/19 8pm.   In ED patient received 1LNS and cefepime. (24 Jun 2019 15:39)      Patient now with temperature of    #Recurrent Fever  -Ofirmev 650mg IV x1  -Cooling measures prn for pyrexia  -BCx x2 collected 6/26 without growth  -UA/UCx ordered  -CXR (6/24) with question of pneumonia; repeat CXR ordered  -c/w cefepime and flagyl  -Consider CT chest if indicated  -ID is following patient  -Will continue to closely monitor patient/vitals   -Will endorse to primary team in SARAH Maria PA-C  Dept of Medicine  [id number] CC: temperature 101.9F    HPI:  Notified by RN patient with temperature of 101.9F. Patient seen and examined  by me at bedside, unable to assess ROS because patient is nonverbal. Aide present at bedside.       ROS:  Unable to assess because patient is nonverbal      PAST MEDICAL & SURGICAL HISTORY:  Type 2 diabetes mellitus  Dementia  DVT, lower extremity  CVA (cerebral vascular accident)  Fatty pancreas  PNA (pneumonia)  Pulmonary HTN  IGT (impaired glucose tolerance)  Ulcerative colitis  Acid reflux  Anxiety  Depression  Mouth sores  HLD (hyperlipidemia)  Asthma  S/P percutaneous endoscopic gastrostomy (PEG) tube placement: for dysphagia  Humeral head fracture  H/O: hysterectomy  H/O cataract extraction, left  History of knee replacement          VITAL SIGNS:  T(C): 38.8 (06-27-19 @ 20:48), Max: 38.8 (06-27-19 @ 20:48)  HR: 112 (06-27-19 @ 20:48) (94 - 112)  BP: 128/76 (06-27-19 @ 20:48) (112/67 - 131/67)  RR: 20 (06-27-19 @ 20:48) (18 - 20)  SpO2: 98% (06-27-19 @ 20:48) (94% - 99%)      Physical Exam:  General: Thin, chronically ill-appearing female laying in bed, contracted, NAD, nonverbal  Head:  NC/AT  CV: (+) tachycardic, S1S2   Respiratory: CTA B/L anteriorly, nonlabored  Abdominal: (+) bowel sounds x4. Soft, NT, ND, no palpable mass, no guarding, or rebound tenderness  Genitourinary: (+) Blackman with dark yellow urine  MSK: (+) contracted upper and lower extremities.  (+) peripheral pulses  Skin: (+) warm, dry         LABORATORY:                        8.6    10.98 )-----------( 403      ( 27 Jun 2019 09:51 )             27.1       06-27    138  |  102  |  36<H>  ----------------------------<  142<H>  3.8   |  24  |  0.55    Ca    8.8      27 Jun 2019 07:22          Culture - Blood (collected 26 Jun 2019 20:15)  Source: .Blood  Preliminary Report (27 Jun 2019 21:01):    No growth to date.    Culture - Blood (collected 26 Jun 2019 20:15)  Source: .Blood  Preliminary Report (27 Jun 2019 21:01):    No growth to date.          RADIOLOGY:  < from: Xray Chest 1 View AP/PA (06.24.19 @ 14:17) >  The heart is enlarged. Left lingular pneumonia cannot be ruled out   entirely. A lateral radiograph should be obtained. Degenerative changes   of the thoracic spine.      < from: CT Abdomen and Pelvis w/ Oral Cont (06.24.19 @ 12:16) >  Malpositioned PEG tube with balloon and tip in the left ventral abdominal   wall with small surrounding loculated fluid. No bowel obstruction.  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  thinks she must have pulled it out this morning. He says otherwise she was in her usual state of health at home. He says she has had some intermittent low grade fevers at home, which usually respond to Tylenol.  (24 Jun 2019 15:39)  Patient now presenting acutely with recurrent fevers.     #Recurrent Fever ?secondary to dislodged PEG tube (s/p replacement) vs chronic MRSA infection  -Labs with improving leukocytosis  -Ofirmev 650mg IV x1  -Cooling measures PRN pyrexia  -BCx x2 collected 6/26 without growth  -UA ordered  -CXR (6/24) with question of pneumonia; repeat CXR ordered  -c/w cefepime and flagyl  -Consider CT chest if indicated  -ID is following patient  -Will continue to closely monitor patient/vitals   -Will endorse to primary team in AM      Luis Maria PA-C  Dept of Medicine  [phone]

## 2019-06-27 NOTE — DISCHARGE NOTE PROVIDER - HOSPITAL COURSE
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         PEG tube malfunction: PEG tube malfunction peg placed            Type 2 diabetes mellitus without complication, without long-term current use of insulin: Type 2 diabetes mellitus without complication, without long-term current use of insulin continue insulin     Pressure injury of skin of sacral region, unspecified injury stage: Pressure injury of skin of sacral region, unspecified injury stage continue wound care    Chronic deep vein thrombosis (DVT) of right lower extremity, unspecified vein: Chronic deep vein thrombosis (DVT) of right lower extremity, unspecified vein eliquis on hold heparin given     Prophylactic measure: Prophylactic measure continue heparin     Dementia without behavioral disturbance, unspecified dementia type: Dementia without behavioral disturbance, unspecified dementia type supportive care    Uncomplicated asthma, unspecified asthma severity, unspecified whether persistent: Uncomplicated asthma, unspecified asthma severity, unspecified whether persistent on budesnide        Hospital course complicated by  worsening anemia Hgb 7.1, without overt bleeding noted. Hgb improved to 8.6  after  1 unit PRBC                             -GI/DVT Prophylaxis. 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         PEG tube malfunction: PEG tube malfunction peg placed            Type 2 diabetes mellitus without complication, without long-term current use of insulin: Type 2 diabetes mellitus without complication, without long-term current use of insulin continue insulin     Pressure injury of skin of sacral region, unspecified injury stage: Pressure injury of skin of sacral region, unspecified injury stage continue wound care    Chronic deep vein thrombosis (DVT) of right lower extremity, unspecified vein: Chronic deep vein thrombosis (DVT) of right lower extremity, unspecified vein eliquis on hold due to worsening anemia. Heparin subcutaneous given .    Prophylactic measure: Prophylactic measure continue heparin     Dementia without behavioral disturbance, unspecified dementia type: Dementia without behavioral disturbance, unspecified dementia type supportive care    Uncomplicated asthma, unspecified asthma severity, unspecified whether persistent: Uncomplicated asthma, unspecified asthma severity, unspecified whether persistent on budesnide        Hospital course complicated by  worsening anemia Hgb 7.1, without overt bleeding noted. Hgb improved to 8.6  after  1 unit PRBC . TRANG /ATN due to multifactors and Vancomycin which was stopped . Hyponatremia due to TRANG, now improved. Tube feeds changed to Nepro. Creatinine improved. Renal following.                             -GI/DVT Prophylaxis.

## 2019-06-27 NOTE — CHART NOTE - NSCHARTNOTEFT_GEN_A_CORE
Notified by RN FS 64, 65. Patient seen and examined at bedside. AxOx0 baseline. No changes from baseline as per  at bedside. VSS. Hypoglycemia protocol followed. D5NS IVF overnight. Currently on bolus feeds. Consider endo consult in AM.     will continue to monitor   will endorse to day team     Bhargavi Soto PA-C   [phone]

## 2019-06-27 NOTE — PROGRESS NOTE ADULT - SUBJECTIVE AND OBJECTIVE BOX
infectious diseases progress note:    Patient is a 69y old  Female who presents with a chief complaint of PEG tube dislodgement, fever, leukocytosis (26 Jun 2019 16:55)        Gastrostomy malfunction             Allergies    ASA; dye contrast (Anaphylaxis)  aspirin (Short breath)  divalproex sodium (Other (Unknown))  Flowers and Plants (Short breath)  Haldol (Other (Unknown))  penicillin (Short breath; Rash)  sulfa drugs (Short breath; Rash)  vancomycin (Rash; Urticaria; Hives)  Xanax (Other (Unknown))    Intolerances        ANTIBIOTICS/RELEVANT:  antimicrobials  cefepime   IVPB 1000 milliGRAM(s) IV Intermittent every 8 hours  metroNIDAZOLE  IVPB 500 milliGRAM(s) IV Intermittent every 8 hours  metroNIDAZOLE  IVPB        immunologic:    OTHER:  acetaminophen  Suppository .. 650 milliGRAM(s) Rectal every 6 hours PRN  ALBUTerol    90 MICROgram(s) HFA Inhaler 1 Puff(s) Inhalation every 4 hours PRN  ALBUTerol/ipratropium for Nebulization 3 milliLiter(s) Nebulizer every 6 hours PRN  ascorbic acid 500 milliGRAM(s) Oral daily  buDESOnide  80 MICROgram(s)/formoterol 4.5 MICROgram(s) Inhaler 2 Puff(s) Inhalation two times a day  carboxymethylcellulose 0.5% (Preservative-Free) Ophthalmic Solution 1 Drop(s) Both EYES three times a day PRN  chlorhexidine 2% Cloths 1 Application(s) Topical daily  clonazePAM  Tablet 1.5 milliGRAM(s) Oral at bedtime  dextrose 40% Gel 15 Gram(s) Oral once PRN  dextrose 5% + sodium chloride 0.9%. 1000 milliLiter(s) IV Continuous <Continuous>  dextrose 5% + sodium chloride 0.9%. 1000 milliLiter(s) IV Continuous <Continuous>  dextrose 5%. 1000 milliLiter(s) IV Continuous <Continuous>  dextrose 50% Injectable 12.5 Gram(s) IV Push once  dextrose 50% Injectable 25 Gram(s) IV Push once  dextrose 50% Injectable 25 Gram(s) IV Push once  ergocalciferol Drops 5000 Unit(s) Oral daily  famotidine  IVPB 20 milliGRAM(s) IV Intermittent daily  ferrous    sulfate Liquid 300 milliGRAM(s) Enteral Tube daily  gabapentin 300 milliGRAM(s) Oral two times a day  glucagon  Injectable 1 milliGRAM(s) IntraMuscular once PRN  heparin  Injectable 5000 Unit(s) SubCutaneous every 12 hours  insulin lispro (HumaLOG) corrective regimen sliding scale   SubCutaneous every 6 hours  Medical Marijuana Awake Oil 2 milliLiter(s) 2 milliLiter(s) Enteral Tube <User Schedule>  Medical Marijuana Calm Oil 2 milliLiter(s) 2 milliLiter(s) Enteral Tube <User Schedule>  Medical Marijuana [place] Oil 2 milliLiter(s) 2 milliLiter(s) Enteral Tube <User Schedule>  multivitamin 1 Tablet(s) Oral daily  nystatin Powder 1 Application(s) Topical three times a day PRN  QUEtiapine 25 milliGRAM(s) Oral daily  tiotropium 18 MICROgram(s) Capsule 1 Capsule(s) Inhalation daily  tiZANidine 4 milliGRAM(s) Oral <User Schedule>  zinc sulfate 220 milliGRAM(s) Oral daily      Objective:  Vital Signs Last 24 Hrs  T(C): 37 (27 Jun 2019 06:55), Max: 37.3 (26 Jun 2019 08:26)  T(F): 98.6 (27 Jun 2019 06:55), Max: 99.1 (26 Jun 2019 08:26)  HR: 94 (27 Jun 2019 06:55) (94 - 101)  BP: 131/67 (27 Jun 2019 06:55) (101/61 - 131/67)  BP(mean): --  RR: 20 (27 Jun 2019 06:55) (19 - 20)  SpO2: 99% (27 Jun 2019 06:55) (99% - 100%)    PHYSICAL EXAM:     Eyes:JACQUELIN, EOMI  Ear/Nose/Throat: no oral lesion, no sinus tenderness on percussion	  Neck:no JVD, no lymphadenopathy, supple  Respiratory: CTA maryjane  Cardiovascular: S1S2 RRR, no murmurs  Gastrointestinal:soft, (+) BS, no HSM  Extremities ontracted skin dry no  rash         LABS:                        7.1    13.6  )-----------( 484      ( 26 Jun 2019 10:52 )             21.5     06-26    141  |  104  |  34<H>  ----------------------------<  149<H>  3.5   |  24  |  0.52    Ca    9.1      26 Jun 2019 10:52    TPro  5.6<L>  /  Alb  2.8<L>  /  TBili  0.3  /  DBili  x   /  AST  9<L>  /  ALT  8<L>  /  AlkPhos  72  06-25            MICROBIOLOGY:    RECENT CULTURES:  06-24 @ 17:30 .Blood                No growth to date.          RESPIRATORY CULTURES:              RADIOLOGY & ADDITIONAL STUDIES:        Pager [phone]  After 5 pm/weekends or if no response :[phone]

## 2019-06-27 NOTE — PROGRESS NOTE ADULT - SUBJECTIVE AND OBJECTIVE BOX
MYRIAM WHITE:[id number],   69yFemale followed for:  ASA; dye contrast (Anaphylaxis)  aspirin (Short breath)  divalproex sodium (Other (Unknown))  Flowers and Plants (Short breath)  Haldol (Other (Unknown))  penicillin (Short breath; Rash)  sulfa drugs (Short breath; Rash)  vancomycin (Rash; Urticaria; Hives)  Xanax (Other (Unknown))    PAST MEDICAL & SURGICAL HISTORY:  Type 2 diabetes mellitus  Dementia  DVT, lower extremity  CVA (cerebral vascular accident)  Fatty pancreas  PNA (pneumonia)  Pulmonary HTN  IGT (impaired glucose tolerance)  Ulcerative colitis  Acid reflux  Anxiety  Depression  Mouth sores  HLD (hyperlipidemia)  Asthma  S/P percutaneous endoscopic gastrostomy (PEG) tube placement: for dysphagia  Humeral head fracture  H/O: hysterectomy  H/O cataract extraction, left  History of knee replacement    FAMILY HISTORY:  Family history of dementia (Grandparent)  Family history of colon cancer (Grandparent)    MEDICATIONS  (STANDING):  ascorbic acid 500 milliGRAM(s) Oral daily  buDESOnide  80 MICROgram(s)/formoterol 4.5 MICROgram(s) Inhaler 2 Puff(s) Inhalation two times a day  cefepime   IVPB 1000 milliGRAM(s) IV Intermittent every 8 hours  chlorhexidine 2% Cloths 1 Application(s) Topical daily  clonazePAM  Tablet 1.5 milliGRAM(s) Oral at bedtime  dextrose 5% + sodium chloride 0.9%. 1000 milliLiter(s) (50 mL/Hr) IV Continuous <Continuous>  dextrose 5% + sodium chloride 0.9%. 1000 milliLiter(s) (50 mL/Hr) IV Continuous <Continuous>  dextrose 5%. 1000 milliLiter(s) (50 mL/Hr) IV Continuous <Continuous>  dextrose 50% Injectable 12.5 Gram(s) IV Push once  dextrose 50% Injectable 25 Gram(s) IV Push once  dextrose 50% Injectable 25 Gram(s) IV Push once  ergocalciferol Drops 5000 Unit(s) Oral daily  famotidine  IVPB 20 milliGRAM(s) IV Intermittent daily  ferrous    sulfate Liquid 300 milliGRAM(s) Enteral Tube daily  gabapentin 300 milliGRAM(s) Oral two times a day  heparin  Injectable 5000 Unit(s) SubCutaneous every 12 hours  insulin lispro (HumaLOG) corrective regimen sliding scale   SubCutaneous every 6 hours  Medical Marijuana Awake Oil 2 milliLiter(s) 2 milliLiter(s) Enteral Tube <User Schedule>  Medical Marijuana Calm Oil 2 milliLiter(s) 2 milliLiter(s) Enteral Tube <User Schedule>  Medical Marijuana [place] Oil 2 milliLiter(s) 2 milliLiter(s) Enteral Tube <User Schedule>  metroNIDAZOLE  IVPB 500 milliGRAM(s) IV Intermittent every 8 hours  metroNIDAZOLE  IVPB      multivitamin 1 Tablet(s) Oral daily  QUEtiapine 25 milliGRAM(s) Oral daily  tiotropium 18 MICROgram(s) Capsule 1 Capsule(s) Inhalation daily  tiZANidine 4 milliGRAM(s) Oral <User Schedule>  zinc sulfate 220 milliGRAM(s) Oral daily    MEDICATIONS  (PRN):  acetaminophen  Suppository .. 650 milliGRAM(s) Rectal every 6 hours PRN Temp greater or equal to 38C (100.4F), Mild Pain (1 - 3)  ALBUTerol    90 MICROgram(s) HFA Inhaler 1 Puff(s) Inhalation every 4 hours PRN Shortness of Breath and/or Wheezing  ALBUTerol/ipratropium for Nebulization 3 milliLiter(s) Nebulizer every 6 hours PRN Shortness of Breath and/or Wheezing  carboxymethylcellulose 0.5% (Preservative-Free) Ophthalmic Solution 1 Drop(s) Both EYES three times a day PRN dry eyes  dextrose 40% Gel 15 Gram(s) Oral once PRN Blood Glucose LESS THAN 70 milliGRAM(s)/deciliter  glucagon  Injectable 1 milliGRAM(s) IntraMuscular once PRN Glucose LESS THAN 70 milligrams/deciliter  nystatin Powder 1 Application(s) Topical three times a day PRN under breasts      Vital Signs Last 24 Hrs  T(C): 37 (27 Jun 2019 06:55), Max: 37 (27 Jun 2019 06:55)  T(F): 98.6 (27 Jun 2019 06:55), Max: 98.6 (27 Jun 2019 06:55)  HR: 94 (27 Jun 2019 06:55) (94 - 101)  BP: 131/67 (27 Jun 2019 06:55) (101/61 - 131/67)  BP(mean): --  RR: 20 (27 Jun 2019 06:55) (19 - 20)  SpO2: 99% (27 Jun 2019 06:55) (99% - 100%)  nc/at  s1s2  cta  soft, nt, nd no guarding or rebound  no c/c/e    CBC Full  -  ( 26 Jun 2019 10:52 )  WBC Count : 13.6 K/uL  RBC Count : 2.33 M/uL  Hemoglobin : 7.1 g/dL  Hematocrit : 21.5 %  Platelet Count - Automated : 484 K/uL  Mean Cell Volume : 92.2 fl  Mean Cell Hemoglobin : 30.5 pg  Mean Cell Hemoglobin Concentration : 33.0 gm/dL  Auto Neutrophil # : 11.2 K/uL  Auto Lymphocyte # : 0.8 K/uL  Auto Monocyte # : 0.9 K/uL  Auto Eosinophil # : 0.6 K/uL  Auto Basophil # : 0.0 K/uL  Auto Neutrophil % : 82.8 %  Auto Lymphocyte % : 5.7 %  Auto Monocyte % : 6.9 %  Auto Eosinophil % : 4.5 %  Auto Basophil % : 0.2 %    06-27    138  |  102  |  36<H>  ----------------------------<  142<H>  3.8   |  24  |  0.55    Ca    8.8      27 Jun 2019 07:22    TPro  5.6<L>  /  Alb  2.8<L>  /  TBili  0.3  /  DBili  x   /  AST  9<L>  /  ALT  8<L>  /  AlkPhos  72  06-25

## 2019-06-27 NOTE — DISCHARGE NOTE PROVIDER - CARE PROVIDER_API CALL
Deniz Bolden (DO)  Family Medicine  [address] [place] [id number]  Phone: [phone]  Fax: [phone]  Follow Up Time: 1 week    Yulissa Key)  Surgery; Vascular Surgery  [address] [facility], [address]  [place] [address]  Phone: [phone]  Fax: [phone]  Follow Up Time: 1 week

## 2019-06-27 NOTE — DISCHARGE NOTE PROVIDER - NSDCCPCAREPLAN_GEN_ALL_CORE_FT
PRINCIPAL DISCHARGE DIAGNOSIS  Diagnosis: PEG tube malfunction  Assessment and Plan of Treatment: Replaced in IR.  Continue with PEG tube care .  Follow-up with your primary care physician within 1 week. Call for appointment.        SECONDARY DISCHARGE DIAGNOSES  Diagnosis: Sepsis, due to unspecified organism  Assessment and Plan of Treatment: Take all antibiotics as ordered.  Call you Health care provider upon arrival home to make a one week follow up appointment.  If you develop fever, chills, malaise, or change in mental status call your Health Care Provider or go to the Emergency Department.  Nutrition is important, eat small frequent meals to help ensure you get adequate calories.  Do not stay in bed all day!  Increase your activity daily as tolerated.      Diagnosis: Anemia  Assessment and Plan of Treatment: Symptoms to report, bleeding, palpitations, fatigue, pale skin, cold skin, dizziness. Take medications as ordered by PCP      Diagnosis: Type 2 diabetes mellitus without complication, without long-term current use of insulin  Assessment and Plan of Treatment:   Make sure you get your HgA1c checked every three months.  If you take oral diabetes medications, check your blood glucose two times a day.  If you take insulin, check your blood glucose before meals and at bedtime.  It's important not to skip any meals.  Keep a log of your blood glucose results and always take it with you to your doctor appointments.  Keep a list of your current medications including injectables and over the counter medications and bring this medication list with you to all your doctor appointments.  If you have not seen your ophthalmologist this year call for appointment.  Check your feet daily for redness, sores, or openings. Do not self treat. If no improvement in two days call your primary care physician for an appointment.  Low blood sugar (hypoglycemia) is a blood sugar below 70mg/dl. Check your blood sugar if you feel signs/symptoms of hypoglycemia. If your blood sugar is below 70 take 15 grams of carbohydrates (ex 4 oz of apple juice, 3-4 glucose tablets, or 4-6 oz of regular soda) wait 15 minutes and repeat blood sugar to make sure it comes up above 70.  If your blood sugar is above 70 and you are due for a meal, have a meal.  If you are not due for a meal have a snack.  This snack helps keeps your blood sugar at a safe range.      Diagnosis: Chronic deep vein thrombosis (DVT) of right lower extremity, unspecified vein  Assessment and Plan of Treatment: Take your "blood thinners" as prescribed.  Walking is encouraged, increase activity as tolerated.  If you develop new leg pain, swelling, and/or redness contact your healthcare provider.  If you develop new chest pain with difficulty breathing, a rapid heart rate and/or a feeling of passing out call emergency medical services 911.      Diagnosis: Pressure injury of skin of sacral region, unspecified injury stage  Assessment and Plan of Treatment: Continue with wound care as instructed.   Maintain adequate nutrition, frequent repositioning , offloading with Zfloat boots.  Follow-up with your primary care physician and Wound Care Specialist within 1 week. Call for appointment.      Diagnosis: Dementia without behavioral disturbance, unspecified dementia type  Assessment and Plan of Treatment: Continue with medications as prescribed by your doctor.  Maintain safe environment.  Maintain adequate nutrition, hydration.  Follow-up with your primary care physician within 1 week. Call for appointment. PRINCIPAL DISCHARGE DIAGNOSIS  Diagnosis: PEG tube malfunction  Assessment and Plan of Treatment: Replaced in IR.  Continue with PEG tube care .  f/u with Home Hosp[facility]        SECONDARY DISCHARGE DIAGNOSES  Diagnosis: Anemia  Assessment and Plan of Treatment: Symptoms to report, bleeding, palpitations, fatigue, pale skin, cold skin, dizziness. Take medications as ordered by PCP      Diagnosis: Dementia without behavioral disturbance, unspecified dementia type  Assessment and Plan of Treatment: Continue with medications as prescribed by your doctor  f/u with Home [facility]osp[facility]      Diagnosis: Chronic deep vein thrombosis (DVT) of right lower extremity, unspecified vein  Assessment and Plan of Treatment: Take your "blood thinners" as prescribed  f/u with Hospice      Diagnosis: Type 2 diabetes mellitus without complication, without long-term current use of insulin  Assessment and Plan of Treatment: Make sure you get your HgA1c checked every three months.  If you take oral diabetes medications, check your blood glucose two times a day.      Diagnosis: Pressure injury of skin of sacral region, unspecified injury stage  Assessment and Plan of Treatment: Continue with wound care as instructed  f/u with Hops[facility]      Diagnosis: Rash  Assessment and Plan of Treatment: use topical cream as prescribed    Diagnosis: Sepsis, due to unspecified organism  Assessment and Plan of Treatment: resolved

## 2019-06-28 LAB
ANION GAP SERPL CALC-SCNC: 17 MMOL/L — SIGNIFICANT CHANGE UP (ref 5–17)
APPEARANCE UR: ABNORMAL
BILIRUB UR-MCNC: NEGATIVE — SIGNIFICANT CHANGE UP
BUN SERPL-MCNC: 37 MG/DL — HIGH (ref 7–23)
CALCIUM SERPL-MCNC: 9.2 MG/DL — SIGNIFICANT CHANGE UP (ref 8.4–10.5)
CHLORIDE SERPL-SCNC: 103 MMOL/L — SIGNIFICANT CHANGE UP (ref 96–108)
CO2 SERPL-SCNC: 21 MMOL/L — LOW (ref 22–31)
COLOR SPEC: YELLOW — SIGNIFICANT CHANGE UP
CREAT SERPL-MCNC: 0.55 MG/DL — SIGNIFICANT CHANGE UP (ref 0.5–1.3)
DIFF PNL FLD: ABNORMAL
GLUCOSE BLDC GLUCOMTR-MCNC: 152 MG/DL — HIGH (ref 70–99)
GLUCOSE BLDC GLUCOMTR-MCNC: 177 MG/DL — HIGH (ref 70–99)
GLUCOSE BLDC GLUCOMTR-MCNC: 90 MG/DL — SIGNIFICANT CHANGE UP (ref 70–99)
GLUCOSE BLDC GLUCOMTR-MCNC: 96 MG/DL — SIGNIFICANT CHANGE UP (ref 70–99)
GLUCOSE SERPL-MCNC: 136 MG/DL — HIGH (ref 70–99)
GLUCOSE UR QL: NEGATIVE — SIGNIFICANT CHANGE UP
HCT VFR BLD CALC: 29 % — LOW (ref 34.5–45)
HGB BLD-MCNC: 9.9 G/DL — LOW (ref 11.5–15.5)
KETONES UR-MCNC: NEGATIVE — SIGNIFICANT CHANGE UP
LEUKOCYTE ESTERASE UR-ACNC: ABNORMAL
MCHC RBC-ENTMCNC: 31.6 PG — SIGNIFICANT CHANGE UP (ref 27–34)
MCHC RBC-ENTMCNC: 34.2 GM/DL — SIGNIFICANT CHANGE UP (ref 32–36)
MCV RBC AUTO: 92.6 FL — SIGNIFICANT CHANGE UP (ref 80–100)
NITRITE UR-MCNC: NEGATIVE — SIGNIFICANT CHANGE UP
PH UR: 6 — SIGNIFICANT CHANGE UP (ref 5–8)
PLATELET # BLD AUTO: 454 K/UL — HIGH (ref 150–400)
POTASSIUM SERPL-MCNC: 5 MMOL/L — SIGNIFICANT CHANGE UP (ref 3.5–5.3)
POTASSIUM SERPL-SCNC: 5 MMOL/L — SIGNIFICANT CHANGE UP (ref 3.5–5.3)
PROT UR-MCNC: ABNORMAL
RBC # BLD: 3.14 M/UL — LOW (ref 3.8–5.2)
RBC # FLD: 14.5 % — SIGNIFICANT CHANGE UP (ref 10.3–14.5)
SODIUM SERPL-SCNC: 141 MMOL/L — SIGNIFICANT CHANGE UP (ref 135–145)
SP GR SPEC: 1.02 — SIGNIFICANT CHANGE UP (ref 1.01–1.02)
UROBILINOGEN FLD QL: SIGNIFICANT CHANGE UP
WBC # BLD: 11.5 K/UL — HIGH (ref 3.8–10.5)
WBC # FLD AUTO: 11.5 K/UL — HIGH (ref 3.8–10.5)

## 2019-06-28 PROCEDURE — 99232 SBSQ HOSP IP/OBS MODERATE 35: CPT

## 2019-06-28 RX ORDER — VANCOMYCIN HCL 1 G
500 VIAL (EA) INTRAVENOUS EVERY 12 HOURS
Refills: 0 | Status: DISCONTINUED | OUTPATIENT
Start: 2019-06-29 | End: 2019-07-03

## 2019-06-28 RX ORDER — SODIUM CHLORIDE 9 MG/ML
250 INJECTION INTRAMUSCULAR; INTRAVENOUS; SUBCUTANEOUS ONCE
Refills: 0 | Status: COMPLETED | OUTPATIENT
Start: 2019-06-28 | End: 2019-06-28

## 2019-06-28 RX ORDER — MIDODRINE HYDROCHLORIDE 2.5 MG/1
5 TABLET ORAL EVERY 8 HOURS
Refills: 0 | Status: DISCONTINUED | OUTPATIENT
Start: 2019-06-28 | End: 2019-07-13

## 2019-06-28 RX ORDER — ACETAMINOPHEN 500 MG
1000 TABLET ORAL ONCE
Refills: 0 | Status: DISCONTINUED | OUTPATIENT
Start: 2019-06-28 | End: 2019-06-28

## 2019-06-28 RX ORDER — DIPHENHYDRAMINE HCL 50 MG
50 CAPSULE ORAL ONCE
Refills: 0 | Status: COMPLETED | OUTPATIENT
Start: 2019-06-28 | End: 2019-06-28

## 2019-06-28 RX ORDER — VANCOMYCIN HCL 1 G
500 VIAL (EA) INTRAVENOUS ONCE
Refills: 0 | Status: COMPLETED | OUTPATIENT
Start: 2019-06-28 | End: 2019-06-28

## 2019-06-28 RX ORDER — VANCOMYCIN HCL 1 G
VIAL (EA) INTRAVENOUS
Refills: 0 | Status: DISCONTINUED | OUTPATIENT
Start: 2019-06-28 | End: 2019-07-03

## 2019-06-28 RX ORDER — ACETAMINOPHEN 500 MG
500 TABLET ORAL ONCE
Refills: 0 | Status: COMPLETED | OUTPATIENT
Start: 2019-06-28 | End: 2019-06-28

## 2019-06-28 RX ADMIN — QUETIAPINE FUMARATE 25 MILLIGRAM(S): 200 TABLET, FILM COATED ORAL at 22:29

## 2019-06-28 RX ADMIN — SODIUM CHLORIDE 250 MILLILITER(S): 9 INJECTION INTRAMUSCULAR; INTRAVENOUS; SUBCUTANEOUS at 01:30

## 2019-06-28 RX ADMIN — GABAPENTIN 300 MILLIGRAM(S): 400 CAPSULE ORAL at 08:41

## 2019-06-28 RX ADMIN — TIZANIDINE 4 MILLIGRAM(S): 4 TABLET ORAL at 08:41

## 2019-06-28 RX ADMIN — Medication 1.5 MILLIGRAM(S): at 22:29

## 2019-06-28 RX ADMIN — Medication 200 MILLIGRAM(S): at 18:25

## 2019-06-28 RX ADMIN — Medication 1: at 12:56

## 2019-06-28 RX ADMIN — Medication 500 MILLIGRAM(S): at 13:02

## 2019-06-28 RX ADMIN — MIDODRINE HYDROCHLORIDE 5 MILLIGRAM(S): 2.5 TABLET ORAL at 22:29

## 2019-06-28 RX ADMIN — CEFEPIME 100 MILLIGRAM(S): 1 INJECTION, POWDER, FOR SOLUTION INTRAMUSCULAR; INTRAVENOUS at 00:17

## 2019-06-28 RX ADMIN — ZINC SULFATE TAB 220 MG (50 MG ZINC EQUIVALENT) 220 MILLIGRAM(S): 220 (50 ZN) TAB at 12:55

## 2019-06-28 RX ADMIN — Medication 1 TABLET(S): at 12:55

## 2019-06-28 RX ADMIN — Medication 500 MILLIGRAM(S): at 22:29

## 2019-06-28 RX ADMIN — Medication 1: at 01:09

## 2019-06-28 RX ADMIN — GABAPENTIN 300 MILLIGRAM(S): 400 CAPSULE ORAL at 20:41

## 2019-06-28 RX ADMIN — Medication 500 MILLIGRAM(S): at 18:40

## 2019-06-28 RX ADMIN — HEPARIN SODIUM 5000 UNIT(S): 5000 INJECTION INTRAVENOUS; SUBCUTANEOUS at 17:03

## 2019-06-28 RX ADMIN — Medication 100 MILLIGRAM(S): at 20:39

## 2019-06-28 RX ADMIN — Medication 500 MILLIGRAM(S): at 12:57

## 2019-06-28 RX ADMIN — Medication 50 MILLIGRAM(S): at 18:48

## 2019-06-28 RX ADMIN — Medication 500 MILLIGRAM(S): at 07:10

## 2019-06-28 RX ADMIN — Medication 300 MILLIGRAM(S): at 12:56

## 2019-06-28 RX ADMIN — CHLORHEXIDINE GLUCONATE 1 APPLICATION(S): 213 SOLUTION TOPICAL at 12:57

## 2019-06-28 RX ADMIN — TIZANIDINE 4 MILLIGRAM(S): 4 TABLET ORAL at 20:41

## 2019-06-28 RX ADMIN — FAMOTIDINE 100 MILLIGRAM(S): 10 INJECTION INTRAVENOUS at 12:56

## 2019-06-28 RX ADMIN — CEFEPIME 100 MILLIGRAM(S): 1 INJECTION, POWDER, FOR SOLUTION INTRAMUSCULAR; INTRAVENOUS at 08:28

## 2019-06-28 RX ADMIN — HEPARIN SODIUM 5000 UNIT(S): 5000 INJECTION INTRAVENOUS; SUBCUTANEOUS at 06:56

## 2019-06-28 RX ADMIN — CEFEPIME 100 MILLIGRAM(S): 1 INJECTION, POWDER, FOR SOLUTION INTRAMUSCULAR; INTRAVENOUS at 17:02

## 2019-06-28 RX ADMIN — ERGOCALCIFEROL 5000 UNIT(S): 1.25 CAPSULE ORAL at 17:02

## 2019-06-28 NOTE — PROGRESS NOTE ADULT - ASSESSMENT
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     most l;ikely this event appears to be related  to peg being dislodged      she probably has a chronic mrsa infection of spacer   discussed with gi and family   currently on cefepime and flagyl  ( unclear if she is really allergic to meropenem.)  still with l  fevers: peg replaced   chest xray is difficult to interrupt because she is rotated but there may be a pna  will add back vanco cover mrsa in lungs and mrsa in spacer of leg   no new rash  prognosis remains dismal.   she has been in  dn out of the hospital for months    discussed with  in detail;  ID to follow next week t

## 2019-06-28 NOTE — PROGRESS NOTE ADULT - ASSESSMENT
fever evaluation in progress, peg site looks OK, abdominal exam benign  await results of fever w/u  PEG feeds as tolerates  discussed with

## 2019-06-28 NOTE — PROGRESS NOTE ADULT - SUBJECTIVE AND OBJECTIVE BOX
infectious diseases progress note:    Patient is a 69y old  Female who presents with a chief complaint of PEG tube dislodgement, fever, leukocytosis (2019 08:33)        Gastrostomy malfunction           ASA; dye contrast (Anaphylaxis)  aspirin (Short breath)  divalproex sodium (Other (Unknown))  Flowers and Plants (Short breath)  Haldol (Other (Unknown))  penicillin (Short breath; Rash)  sulfa drugs (Short breath; Rash)  vancomycin (Rash; Urticaria; Hives)  Xanax (Other (Unknown))    Intolerances        ANTIBIOTICS/RELEVANT:  antimicrobials  cefepime   IVPB 1000 milliGRAM(s) IV Intermittent every 8 hours  metroNIDAZOLE    Tablet 500 milliGRAM(s) Oral every 8 hours    immunologic:    OTHER:  acetaminophen  Suppository .. 650 milliGRAM(s) Rectal every 6 hours PRN  ALBUTerol    90 MICROgram(s) HFA Inhaler 1 Puff(s) Inhalation every 4 hours PRN  ALBUTerol/ipratropium for Nebulization 3 milliLiter(s) Nebulizer every 6 hours PRN  ascorbic acid 500 milliGRAM(s) Oral daily  buDESOnide  80 MICROgram(s)/formoterol 4.5 MICROgram(s) Inhaler 2 Puff(s) Inhalation two times a day  carboxymethylcellulose 0.5% (Preservative-Free) Ophthalmic Solution 1 Drop(s) Both EYES three times a day PRN  chlorhexidine 2% Cloths 1 Application(s) Topical daily  clonazePAM  Tablet 1.5 milliGRAM(s) Oral at bedtime  dextrose 40% Gel 15 Gram(s) Oral once PRN  dextrose 5% + sodium chloride 0.9%. 1000 milliLiter(s) IV Continuous <Continuous>  dextrose 5% + sodium chloride 0.9%. 1000 milliLiter(s) IV Continuous <Continuous>  dextrose 5%. 1000 milliLiter(s) IV Continuous <Continuous>  dextrose 50% Injectable 12.5 Gram(s) IV Push once  dextrose 50% Injectable 25 Gram(s) IV Push once  dextrose 50% Injectable 25 Gram(s) IV Push once  ergocalciferol Drops 5000 Unit(s) Oral daily  famotidine  IVPB 20 milliGRAM(s) IV Intermittent daily  ferrous    sulfate Liquid 300 milliGRAM(s) Enteral Tube daily  gabapentin 300 milliGRAM(s) Oral two times a day  glucagon  Injectable 1 milliGRAM(s) IntraMuscular once PRN  heparin  Injectable 5000 Unit(s) SubCutaneous every 12 hours  insulin lispro (HumaLOG) corrective regimen sliding scale   SubCutaneous every 6 hours  Medical Marijuana Awake Oil 2 milliLiter(s) 2 milliLiter(s) Enteral Tube <User Schedule>  Medical Marijuana Calm Oil 2 milliLiter(s) 2 milliLiter(s) Enteral Tube <User Schedule>  Medical Marijuana [place] Oil 2 milliLiter(s) 2 milliLiter(s) Enteral Tube <User Schedule>  multivitamin 1 Tablet(s) Oral daily  nystatin Powder 1 Application(s) Topical three times a day PRN  QUEtiapine 25 milliGRAM(s) Oral daily  tiotropium 18 MICROgram(s) Capsule 1 Capsule(s) Inhalation daily  tiZANidine 4 milliGRAM(s) Oral <User Schedule>  zinc sulfate 220 milliGRAM(s) Oral daily      Objective:  Vital Signs Last 24 Hrs  T(C): 37.6 (2019 06:39), Max: 38.8 (2019 20:48)  T(F): 99.7 (2019 06:39), Max: 101.9 (2019 20:48)  HR: 102 (2019 06:39) (95 - 112)  BP: 101/64 (2019 06:39) (81/45 - 128/76)  BP(mean): --  RR: 18 (2019 06:39) (18 - 20)  SpO2: 93% (2019 06:39) (93% - 99%)    PHYSICAL EXAM:  C   Ear/Nose/Throat: no oral lesion, no sinus tenderness on percussion	  Neck:no JVD, no lymphadenopathy, supple  Respiratory: CTA maryjane  Cardiovascular: S1S2 RRR, no murmurs  Gastrointestinal:soft, (+) BS, no HSM  Extremities:no e/e/c        LABS:                        8.6    10.98 )-----------( 403      ( 2019 09:51 )             27.1     06-    138  |  102  |  36<H>  ----------------------------<  142<H>  3.8   |  24  |  0.55    Ca    8.8      2019 07:22        Urinalysis Basic - ( 2019 04:14 )    Color: Yellow / Appearance: Slightly Turbid / S.019 / pH: x  Gluc: x / Ketone: Negative  / Bili: Negative / Urobili: <2 mg/dL   Blood: x / Protein: 100 mg/dL / Nitrite: Negative   Leuk Esterase: Large / RBC: 1 /HPF /  /HPF   Sq Epi: x / Non Sq Epi: 2 /HPF / Bacteria: Negative          MICROBIOLOGY:    RECENT CULTURES:   @ 20:15 .Blood                No growth to date.     @ 17:30 .Blood                No growth to date.          RESPIRATORY CULTURES:              RADIOLOGY & ADDITIONAL STUDIES:        Pager [phone]  After 5 pm/weekends or if no response :[phone]

## 2019-06-28 NOTE — PROGRESS NOTE ADULT - SUBJECTIVE AND OBJECTIVE BOX
INTERVAL HPI/OVERNIGHT EVENTS:    MEDICATIONS  (STANDING):  ascorbic acid 500 milliGRAM(s) Oral daily  buDESOnide  80 MICROgram(s)/formoterol 4.5 MICROgram(s) Inhaler 2 Puff(s) Inhalation two times a day  cefepime   IVPB 1000 milliGRAM(s) IV Intermittent every 8 hours  chlorhexidine 2% Cloths 1 Application(s) Topical daily  clonazePAM  Tablet 1.5 milliGRAM(s) Oral at bedtime  dextrose 5% + sodium chloride 0.9%. 1000 milliLiter(s) (50 mL/Hr) IV Continuous <Continuous>  dextrose 5% + sodium chloride 0.9%. 1000 milliLiter(s) (50 mL/Hr) IV Continuous <Continuous>  dextrose 5%. 1000 milliLiter(s) (50 mL/Hr) IV Continuous <Continuous>  dextrose 50% Injectable 12.5 Gram(s) IV Push once  dextrose 50% Injectable 25 Gram(s) IV Push once  dextrose 50% Injectable 25 Gram(s) IV Push once  ergocalciferol Drops 5000 Unit(s) Oral daily  famotidine  IVPB 20 milliGRAM(s) IV Intermittent daily  ferrous    sulfate Liquid 300 milliGRAM(s) Enteral Tube daily  gabapentin 300 milliGRAM(s) Oral two times a day  heparin  Injectable 5000 Unit(s) SubCutaneous every 12 hours  insulin lispro (HumaLOG) corrective regimen sliding scale   SubCutaneous every 6 hours  Medical Marijuana Awake Oil 2 milliLiter(s) 2 milliLiter(s) Enteral Tube <User Schedule>  Medical Marijuana Calm Oil 2 milliLiter(s) 2 milliLiter(s) Enteral Tube <User Schedule>  Medical Marijuana [place] Oil 2 milliLiter(s) 2 milliLiter(s) Enteral Tube <User Schedule>  metroNIDAZOLE    Tablet 500 milliGRAM(s) Oral every 8 hours  multivitamin 1 Tablet(s) Oral daily  QUEtiapine 25 milliGRAM(s) Oral daily  tiotropium 18 MICROgram(s) Capsule 1 Capsule(s) Inhalation daily  tiZANidine 4 milliGRAM(s) Oral <User Schedule>  zinc sulfate 220 milliGRAM(s) Oral daily    MEDICATIONS  (PRN):  acetaminophen  Suppository .. 650 milliGRAM(s) Rectal every 6 hours PRN Temp greater or equal to 38C (100.4F), Mild Pain (1 - 3)  ALBUTerol    90 MICROgram(s) HFA Inhaler 1 Puff(s) Inhalation every 4 hours PRN Shortness of Breath and/or Wheezing  ALBUTerol/ipratropium for Nebulization 3 milliLiter(s) Nebulizer every 6 hours PRN Shortness of Breath and/or Wheezing  carboxymethylcellulose 0.5% (Preservative-Free) Ophthalmic Solution 1 Drop(s) Both EYES three times a day PRN dry eyes  dextrose 40% Gel 15 Gram(s) Oral once PRN Blood Glucose LESS THAN 70 milliGRAM(s)/deciliter  glucagon  Injectable 1 milliGRAM(s) IntraMuscular once PRN Glucose LESS THAN 70 milligrams/deciliter  nystatin Powder 1 Application(s) Topical three times a day PRN under breasts      Allergies    ASA; dye contrast (Anaphylaxis)  aspirin (Short breath)  divalproex sodium (Other (Unknown))  Flowers and Plants (Short breath)  Haldol (Other (Unknown))  penicillin (Short breath; Rash)  sulfa drugs (Short breath; Rash)  vancomycin (Rash; Urticaria; Hives)  Xanax (Other (Unknown))    Intolerances            PHYSICAL EXAM:   Vital Signs:  Vital Signs Last 24 Hrs  T(C): 37.6 (2019 06:39), Max: 38.8 (2019 20:48)  T(F): 99.7 (2019 06:39), Max: 101.9 (2019 20:48)  HR: 102 (2019 06:39) (95 - 112)  BP: 101/64 (2019 06:39) (81/45 - 128/76)  BP(mean): --  RR: 18 (2019 06:39) (18 - 20)  SpO2: 93% (2019 06:39) (93% - 99%)  Daily     Daily     GENERAL:  no distress  HEENT:  NC/AT,  anicteric  CHEST:   no increased effort, breath sounds clear  HEART:  Regular rhythm  ABDOMEN:  Soft, non-tender, non-distended, normoactive bowel sounds,  no masses ,no hepato-splenomegaly, no signs of chronic liver disease  EXTEREMITIES:  no cyanosis      LABS:                        8.6    10.98 )-----------( 403      ( 2019 09:51 )             27.1     06-27    138  |  102  |  36<H>  ----------------------------<  142<H>  3.8   |  24  |  0.55    Ca    8.8      2019 07:22        Urinalysis Basic - ( 2019 04:14 )    Color: Yellow / Appearance: Slightly Turbid / S.019 / pH: x  Gluc: x / Ketone: Negative  / Bili: Negative / Urobili: <2 mg/dL   Blood: x / Protein: 100 mg/dL / Nitrite: Negative   Leuk Esterase: Large / RBC: 1 /HPF /  /HPF   Sq Epi: x / Non Sq Epi: 2 /HPF / Bacteria: Negative        RADIOLOGY & ADDITIONAL TESTS:

## 2019-06-28 NOTE — PROGRESS NOTE ADULT - SUBJECTIVE AND OBJECTIVE BOX
---___---___---___---___---___---___ ---___---___---___---___---___---___---___---___---                  M E D I C A L   A T T E N D I N G   P R O G R E S S   N O T E  ---___---___---___---___---___---___ ---___---___---___---___---___---___---___---___---        ================================================    ++CHIEF COMPLAINT:   Patient is a 69y old  Female who presents with a chief complaint of PEG tube dislodgement, fever, leukocytosis (2019 18:17)      Gastrostomy malfunction resolved but now with fever. cxr shows ? opacity of the left lung  possible pneumonia     ---___---___---___---___---___---  PAST MEDICAL / Surgical  HISTORY:  PAST MEDICAL & SURGICAL HISTORY:  Type 2 diabetes mellitus  Dementia  DVT, lower extremity  CVA (cerebral vascular accident)  Fatty pancreas  PNA (pneumonia)  Pulmonary HTN  IGT (impaired glucose tolerance)  Ulcerative colitis  Acid reflux  Anxiety  Depression  Mouth sores  HLD (hyperlipidemia)  Asthma  S/P percutaneous endoscopic gastrostomy (PEG) tube placement: for dysphagia  Humeral head fracture  H/O: hysterectomy  H/O cataract extraction, left  History of knee replacement      ---___---___---___---___---___---  FAMILY HISTORY:   FAMILY HISTORY:  Family history of dementia (Grandparent)  Family history of colon cancer (Grandparent)        ---___---___---___---___---___---  ALLERGIES:   Allergies    ASA; dye contrast (Anaphylaxis)  aspirin (Short breath)  divalproex sodium (Other (Unknown))  Flowers and Plants (Short breath)  Haldol (Other (Unknown))  penicillin (Short breath; Rash)  sulfa drugs (Short breath; Rash)  vancomycin (Rash; Urticaria; Hives)  Xanax (Other (Unknown))    Intolerances        ---___---___---___---___---___---  MEDICATIONS:  MEDICATIONS  (STANDING):  ascorbic acid 500 milliGRAM(s) Oral daily  buDESOnide  80 MICROgram(s)/formoterol 4.5 MICROgram(s) Inhaler 2 Puff(s) Inhalation two times a day  cefepime   IVPB 1000 milliGRAM(s) IV Intermittent every 8 hours  chlorhexidine 2% Cloths 1 Application(s) Topical daily  clonazePAM  Tablet 1.5 milliGRAM(s) Oral at bedtime  dextrose 5% + sodium chloride 0.9%. 1000 milliLiter(s) (50 mL/Hr) IV Continuous <Continuous>  dextrose 5% + sodium chloride 0.9%. 1000 milliLiter(s) (50 mL/Hr) IV Continuous <Continuous>  dextrose 5%. 1000 milliLiter(s) (50 mL/Hr) IV Continuous <Continuous>  dextrose 50% Injectable 12.5 Gram(s) IV Push once  dextrose 50% Injectable 25 Gram(s) IV Push once  dextrose 50% Injectable 25 Gram(s) IV Push once  ergocalciferol Drops 5000 Unit(s) Oral daily  famotidine  IVPB 20 milliGRAM(s) IV Intermittent daily  ferrous    sulfate Liquid 300 milliGRAM(s) Enteral Tube daily  gabapentin 300 milliGRAM(s) Oral two times a day  heparin  Injectable 5000 Unit(s) SubCutaneous every 12 hours  insulin lispro (HumaLOG) corrective regimen sliding scale   SubCutaneous every 6 hours  Medical Marijuana Awake Oil 2 milliLiter(s) 2 milliLiter(s) Enteral Tube <User Schedule>  Medical Marijuana Calm Oil 2 milliLiter(s) 2 milliLiter(s) Enteral Tube <User Schedule>  Medical Marijuana [place] Oil 2 milliLiter(s) 2 milliLiter(s) Enteral Tube <User Schedule>  metroNIDAZOLE    Tablet 500 milliGRAM(s) Oral every 8 hours  multivitamin 1 Tablet(s) Oral daily  QUEtiapine 25 milliGRAM(s) Oral daily  tiotropium 18 MICROgram(s) Capsule 1 Capsule(s) Inhalation daily  tiZANidine 4 milliGRAM(s) Oral <User Schedule>  zinc sulfate 220 milliGRAM(s) Oral daily    MEDICATIONS  (PRN):  acetaminophen  Suppository .. 650 milliGRAM(s) Rectal every 6 hours PRN Temp greater or equal to 38C (100.4F), Mild Pain (1 - 3)  ALBUTerol    90 MICROgram(s) HFA Inhaler 1 Puff(s) Inhalation every 4 hours PRN Shortness of Breath and/or Wheezing  ALBUTerol/ipratropium for Nebulization 3 milliLiter(s) Nebulizer every 6 hours PRN Shortness of Breath and/or Wheezing  carboxymethylcellulose 0.5% (Preservative-Free) Ophthalmic Solution 1 Drop(s) Both EYES three times a day PRN dry eyes  dextrose 40% Gel 15 Gram(s) Oral once PRN Blood Glucose LESS THAN 70 milliGRAM(s)/deciliter  glucagon  Injectable 1 milliGRAM(s) IntraMuscular once PRN Glucose LESS THAN 70 milligrams/deciliter  nystatin Powder 1 Application(s) Topical three times a day PRN under breasts      ---___---___---___---___---___---  REVIEW OF SYSTEM:    unable to obtain     ---___---___---___---___---___---  VITAL SIGNS:  69y , CAPILLARY BLOOD GLUCOSE      POCT Blood Glucose.: 96 mg/dL (2019 06:01)    T(C): 37.6 (19 @ 06:39), Max: 38.8 (19 @ 20:48)  HR: 102 (19 @ 06:39) (94 - 112)  BP: 101/64 (19 @ 06:39) (81/45 - 131/67)  RR: 18 (19 @ 06:39) (18 - 20)  SpO2: 93% (19 @ 06:39) (93% - 99%)  ---___---___---___---___---___---  PHYSICAL EXAM:    GEN: A&O X 0 , NAD , comfortable  HEENT: NCAT, PERRL, MMM, hearing intact  Neck: supple , no JVD  CVS: S1S2 , regular , No M/R/G appreciated  PULM: decreased breath sounds left lung noted   ABD.: soft. non tender, non distended,  bowel sounds present peg noted   Extrem: intact pulses , edema left leg  , severe lower extremtiy contractures noted   Derm: stage 4 decubitus noted  sacral       ---___---___---___---___---___---            LAB AND IMAGIN.6    10.98 )-----------( 403      ( 2019 09:51 )             27.1               06-    138  |  102  |  36<H>  ----------------------------<  142<H>  3.8   |  24  |  0.55    Ca    8.8      2019 07:22                              Urinalysis Basic - ( 2019 04:14 )    Color: Yellow / Appearance: Slightly Turbid / S.019 / pH: x  Gluc: x / Ketone: Negative  / Bili: Negative / Urobili: <2 mg/dL   Blood: x / Protein: 100 mg/dL / Nitrite: Negative   Leuk Esterase: Large / RBC: 1 /HPF /  /HPF   Sq Epi: x / Non Sq Epi: 2 /HPF / Bacteria: Negative        [All pertinent / recent Imaging reviewed]         ---___---___---___---___---___---___ ---___---___---___---___---                         A S S E S S M E N T   A N D   P L A N :      fever with hypotension continue abx  recommendation as per DR Brand. possible pneumonia ox  lateral xray difficult to obtain due to patient body habitus   peg tube  malfunction resolved   hypotension consider midodrine if bp is still low   asthma continue budesonide  agitation/ insomnia on klonopin and seroquel   dm2 from steroid use on insulin scale   medical marijuana for chronic pain   -GI/DVT Prophylaxis. heparin sq for now   will restart eliquis prior to dc  anemia follow cbc   muscle contracture on gabapentin and tizanidine    --------------------------------------------  Case discussed with   Education given on   ___________________________  Thank you,  Deniz Bolden  [phone]

## 2019-06-29 LAB
ANION GAP SERPL CALC-SCNC: 14 MMOL/L — SIGNIFICANT CHANGE UP (ref 5–17)
BUN SERPL-MCNC: 36 MG/DL — HIGH (ref 7–23)
CALCIUM SERPL-MCNC: 8.7 MG/DL — SIGNIFICANT CHANGE UP (ref 8.4–10.5)
CHLORIDE SERPL-SCNC: 101 MMOL/L — SIGNIFICANT CHANGE UP (ref 96–108)
CO2 SERPL-SCNC: 19 MMOL/L — LOW (ref 22–31)
CREAT SERPL-MCNC: 0.57 MG/DL — SIGNIFICANT CHANGE UP (ref 0.5–1.3)
CULTURE RESULTS: SIGNIFICANT CHANGE UP
CULTURE RESULTS: SIGNIFICANT CHANGE UP
GLUCOSE BLDC GLUCOMTR-MCNC: 124 MG/DL — HIGH (ref 70–99)
GLUCOSE BLDC GLUCOMTR-MCNC: 138 MG/DL — HIGH (ref 70–99)
GLUCOSE BLDC GLUCOMTR-MCNC: 141 MG/DL — HIGH (ref 70–99)
GLUCOSE BLDC GLUCOMTR-MCNC: 181 MG/DL — HIGH (ref 70–99)
GLUCOSE BLDC GLUCOMTR-MCNC: 98 MG/DL — SIGNIFICANT CHANGE UP (ref 70–99)
GLUCOSE BLDC GLUCOMTR-MCNC: 98 MG/DL — SIGNIFICANT CHANGE UP (ref 70–99)
GLUCOSE SERPL-MCNC: 178 MG/DL — HIGH (ref 70–99)
POTASSIUM SERPL-MCNC: 4.3 MMOL/L — SIGNIFICANT CHANGE UP (ref 3.5–5.3)
POTASSIUM SERPL-SCNC: 4.3 MMOL/L — SIGNIFICANT CHANGE UP (ref 3.5–5.3)
SODIUM SERPL-SCNC: 134 MMOL/L — LOW (ref 135–145)
SPECIMEN SOURCE: SIGNIFICANT CHANGE UP
SPECIMEN SOURCE: SIGNIFICANT CHANGE UP
VANCOMYCIN TROUGH SERPL-MCNC: 7.7 UG/ML — LOW (ref 10–20)

## 2019-06-29 RX ORDER — DIPHENHYDRAMINE HCL 50 MG
25 CAPSULE ORAL
Refills: 0 | Status: DISCONTINUED | OUTPATIENT
Start: 2019-06-29 | End: 2019-07-09

## 2019-06-29 RX ADMIN — GABAPENTIN 300 MILLIGRAM(S): 400 CAPSULE ORAL at 08:58

## 2019-06-29 RX ADMIN — SODIUM CHLORIDE 50 MILLILITER(S): 9 INJECTION, SOLUTION INTRAVENOUS at 11:07

## 2019-06-29 RX ADMIN — HEPARIN SODIUM 5000 UNIT(S): 5000 INJECTION INTRAVENOUS; SUBCUTANEOUS at 18:13

## 2019-06-29 RX ADMIN — CEFEPIME 100 MILLIGRAM(S): 1 INJECTION, POWDER, FOR SOLUTION INTRAMUSCULAR; INTRAVENOUS at 00:51

## 2019-06-29 RX ADMIN — Medication 500 MILLIGRAM(S): at 14:27

## 2019-06-29 RX ADMIN — Medication 300 MILLIGRAM(S): at 11:17

## 2019-06-29 RX ADMIN — Medication 1 TABLET(S): at 11:18

## 2019-06-29 RX ADMIN — ERGOCALCIFEROL 5000 UNIT(S): 1.25 CAPSULE ORAL at 13:50

## 2019-06-29 RX ADMIN — Medication 500 MILLIGRAM(S): at 07:00

## 2019-06-29 RX ADMIN — Medication 100 MILLIGRAM(S): at 10:50

## 2019-06-29 RX ADMIN — TIOTROPIUM BROMIDE 1 CAPSULE(S): 18 CAPSULE ORAL; RESPIRATORY (INHALATION) at 11:18

## 2019-06-29 RX ADMIN — Medication 25 MILLIGRAM(S): at 21:03

## 2019-06-29 RX ADMIN — Medication 25 MILLIGRAM(S): at 10:45

## 2019-06-29 RX ADMIN — Medication 500 MILLIGRAM(S): at 11:18

## 2019-06-29 RX ADMIN — CHLORHEXIDINE GLUCONATE 1 APPLICATION(S): 213 SOLUTION TOPICAL at 11:09

## 2019-06-29 RX ADMIN — QUETIAPINE FUMARATE 25 MILLIGRAM(S): 200 TABLET, FILM COATED ORAL at 21:06

## 2019-06-29 RX ADMIN — MIDODRINE HYDROCHLORIDE 5 MILLIGRAM(S): 2.5 TABLET ORAL at 14:27

## 2019-06-29 RX ADMIN — FAMOTIDINE 100 MILLIGRAM(S): 10 INJECTION INTRAVENOUS at 12:16

## 2019-06-29 RX ADMIN — CEFEPIME 100 MILLIGRAM(S): 1 INJECTION, POWDER, FOR SOLUTION INTRAMUSCULAR; INTRAVENOUS at 08:56

## 2019-06-29 RX ADMIN — Medication 650 MILLIGRAM(S): at 19:51

## 2019-06-29 RX ADMIN — ZINC SULFATE TAB 220 MG (50 MG ZINC EQUIVALENT) 220 MILLIGRAM(S): 220 (50 ZN) TAB at 11:29

## 2019-06-29 RX ADMIN — Medication 100 MILLIGRAM(S): at 21:49

## 2019-06-29 RX ADMIN — TIZANIDINE 4 MILLIGRAM(S): 4 TABLET ORAL at 08:59

## 2019-06-29 RX ADMIN — CEFEPIME 100 MILLIGRAM(S): 1 INJECTION, POWDER, FOR SOLUTION INTRAMUSCULAR; INTRAVENOUS at 16:53

## 2019-06-29 RX ADMIN — MIDODRINE HYDROCHLORIDE 5 MILLIGRAM(S): 2.5 TABLET ORAL at 21:06

## 2019-06-29 RX ADMIN — MIDODRINE HYDROCHLORIDE 5 MILLIGRAM(S): 2.5 TABLET ORAL at 07:00

## 2019-06-29 RX ADMIN — BUDESONIDE AND FORMOTEROL FUMARATE DIHYDRATE 2 PUFF(S): 160; 4.5 AEROSOL RESPIRATORY (INHALATION) at 18:13

## 2019-06-29 RX ADMIN — Medication 500 MILLIGRAM(S): at 21:06

## 2019-06-29 RX ADMIN — TIZANIDINE 4 MILLIGRAM(S): 4 TABLET ORAL at 21:06

## 2019-06-29 RX ADMIN — GABAPENTIN 300 MILLIGRAM(S): 400 CAPSULE ORAL at 21:06

## 2019-06-29 RX ADMIN — Medication 1.5 MILLIGRAM(S): at 21:07

## 2019-06-29 RX ADMIN — Medication 1: at 00:52

## 2019-06-29 RX ADMIN — HEPARIN SODIUM 5000 UNIT(S): 5000 INJECTION INTRAVENOUS; SUBCUTANEOUS at 07:00

## 2019-06-29 NOTE — PROGRESS NOTE ADULT - SUBJECTIVE AND OBJECTIVE BOX
---___---___---___---___---___---___ ---___---___---___---___---___---___---___---___---                  M E D I C A L   A T T E N D I N G   P R O G R E S S   N O T E  ---___---___---___---___---___---___ ---___---___---___---___---___---___---___---___---        ================================================    ++CHIEF COMPLAINT:   Patient is a 69y old  Female who presents with a chief complaint of PEG tube dislodgement, fever, leukocytosis (2019 10:11)      Gastrostomy malfunction now with fever and  uti vs pneumonia     ---___---___---___---___---___---  PAST MEDICAL / Surgical  HISTORY:  PAST MEDICAL & SURGICAL HISTORY:  Type 2 diabetes mellitus  Dementia  DVT, lower extremity  CVA (cerebral vascular accident)  Fatty pancreas  PNA (pneumonia)  Pulmonary HTN  IGT (impaired glucose tolerance)  Ulcerative colitis  Acid reflux  Anxiety  Depression  Mouth sores  HLD (hyperlipidemia)  Asthma  S/P percutaneous endoscopic gastrostomy (PEG) tube placement: for dysphagia  Humeral head fracture  H/O: hysterectomy  H/O cataract extraction, left  History of knee replacement      ---___---___---___---___---___---  FAMILY HISTORY:   FAMILY HISTORY:  Family history of dementia (Grandparent)  Family history of colon cancer (Grandparent)        ---___---___---___---___---___---  ALLERGIES:   Allergies    ASA; dye contrast (Anaphylaxis)  aspirin (Short breath)  divalproex sodium (Other (Unknown))  Flowers and Plants (Short breath)  Haldol (Other (Unknown))  penicillin (Short breath; Rash)  sulfa drugs (Short breath; Rash)  vancomycin (Rash; Urticaria; Hives)  Xanax (Other (Unknown))    Intolerances        ---___---___---___---___---___---  MEDICATIONS:  MEDICATIONS  (STANDING):  ascorbic acid 500 milliGRAM(s) Oral daily  buDESOnide  80 MICROgram(s)/formoterol 4.5 MICROgram(s) Inhaler 2 Puff(s) Inhalation two times a day  cefepime   IVPB 1000 milliGRAM(s) IV Intermittent every 8 hours  chlorhexidine 2% Cloths 1 Application(s) Topical daily  clonazePAM  Tablet 1.5 milliGRAM(s) Oral at bedtime  dextrose 5% + sodium chloride 0.9%. 1000 milliLiter(s) (50 mL/Hr) IV Continuous <Continuous>  dextrose 5% + sodium chloride 0.9%. 1000 milliLiter(s) (50 mL/Hr) IV Continuous <Continuous>  dextrose 5%. 1000 milliLiter(s) (50 mL/Hr) IV Continuous <Continuous>  dextrose 50% Injectable 12.5 Gram(s) IV Push once  dextrose 50% Injectable 25 Gram(s) IV Push once  dextrose 50% Injectable 25 Gram(s) IV Push once  diphenhydrAMINE   Injectable 25 milliGRAM(s) IV Push two times a day  ergocalciferol Drops 5000 Unit(s) Oral daily  famotidine  IVPB 20 milliGRAM(s) IV Intermittent daily  ferrous    sulfate Liquid 300 milliGRAM(s) Enteral Tube daily  gabapentin 300 milliGRAM(s) Oral two times a day  heparin  Injectable 5000 Unit(s) SubCutaneous every 12 hours  insulin lispro (HumaLOG) corrective regimen sliding scale   SubCutaneous every 6 hours  Medical Marijuana Awake Oil 2 milliLiter(s) 2 milliLiter(s) Enteral Tube <User Schedule>  Medical Marijuana Calm Oil 2 milliLiter(s) 2 milliLiter(s) Enteral Tube <User Schedule>  Medical Marijuana [place] Oil 2 milliLiter(s) 2 milliLiter(s) Enteral Tube <User Schedule>  metroNIDAZOLE    Tablet 500 milliGRAM(s) Oral every 8 hours  midodrine 5 milliGRAM(s) Oral every 8 hours  multivitamin 1 Tablet(s) Oral daily  QUEtiapine 25 milliGRAM(s) Oral daily  tiotropium 18 MICROgram(s) Capsule 1 Capsule(s) Inhalation daily  tiZANidine 4 milliGRAM(s) Oral <User Schedule>  vancomycin  IVPB 500 milliGRAM(s) IV Intermittent every 12 hours  vancomycin  IVPB      zinc sulfate 220 milliGRAM(s) Oral daily    MEDICATIONS  (PRN):  acetaminophen  Suppository .. 650 milliGRAM(s) Rectal every 6 hours PRN Temp greater or equal to 38C (100.4F), Mild Pain (1 - 3)  ALBUTerol    90 MICROgram(s) HFA Inhaler 1 Puff(s) Inhalation every 4 hours PRN Shortness of Breath and/or Wheezing  ALBUTerol/ipratropium for Nebulization 3 milliLiter(s) Nebulizer every 6 hours PRN Shortness of Breath and/or Wheezing  carboxymethylcellulose 0.5% (Preservative-Free) Ophthalmic Solution 1 Drop(s) Both EYES three times a day PRN dry eyes  dextrose 40% Gel 15 Gram(s) Oral once PRN Blood Glucose LESS THAN 70 milliGRAM(s)/deciliter  glucagon  Injectable 1 milliGRAM(s) IntraMuscular once PRN Glucose LESS THAN 70 milligrams/deciliter  nystatin Powder 1 Application(s) Topical three times a day PRN under breasts      ---___---___---___---___---___---  REVIEW OF SYSTEM:    unable to obtain   ---___---___---___---___---___---  VITAL SIGNS:  69y , CAPILLARY BLOOD GLUCOSE      POCT Blood Glucose.: 98 mg/dL (2019 16:27)    T(C): 36.8 (19 @ 12:00), Max: 38.6 (19 @ 17:30)  HR: 82 (19 @ 12:00) (82 - 120)  BP: 115/69 (19 @ 12:00) (96/56 - 116/65)  RR: 18 (19 @ 12:00) (17 - 18)  SpO2: 100% (19 @ 12:00) (99% - 100%)  ---___---___---___---___---___---  PHYSICAL EXAM:    GEN: A&O X 0 , NAD , comfortable  HEENT: NCAT, PERRL, MMM, hearing intact  Neck: supple , no JVD  CVS: S1S2 , regular , No M/R/G appreciated  PULM: CTA B/L,  no W/R/R appreciated  ABD.: soft. non tender, non distended,  bowel sounds present peg noted   Extrem: intact pulses , no edema b/l extremity contractures noted    Derm:stage 4 sacral decubitus   PSYCH : normal mood,  no delusion not anxious     ---___---___---___---___---___---            LAB AND IMAGIN.9    11.5  )-----------( 454      ( 2019 18:12 )             29.0               -    134<L>  |  101  |  36<H>  ----------------------------<  178<H>  4.3   |  19<L>  |  0.57    Ca    8.7      2019 10:34                              Urinalysis Basic - ( 2019 04:14 )    Color: Yellow / Appearance: Slightly Turbid / S.019 / pH: x  Gluc: x / Ketone: Negative  / Bili: Negative / Urobili: <2 mg/dL   Blood: x / Protein: 100 mg/dL / Nitrite: Negative   Leuk Esterase: Large / RBC: 1 /HPF /  /HPF   Sq Epi: x / Non Sq Epi: 2 /HPF / Bacteria: Negative        [All pertinent / recent Imaging reviewed]         ---___---___---___---___---___---___ ---___---___---___---___---                         A S S E S S M E N T   A N D   P L A N :      fever   leukocytosis  continue abx as per id   chronic gavin catheter continue to maintain   stage 4 sacral decubitus continue wound care   dm2 continue insulin   asthma on budesonide   anxiety / agitation continue klonopin /seroquel  chronic pain from ultiple condition o9n medical marijuana  -GI/DVT Prophylaxis. on heparin     --------------------------------------------  Case discussed with   Education given on   ___________________________  Thank you,  Deniz Bolden  [phone]

## 2019-06-29 NOTE — PROGRESS NOTE ADULT - SUBJECTIVE AND OBJECTIVE BOX
MYRIAM WHITE:[id number],   69yFemale followed for:  ASA; dye contrast (Anaphylaxis)  aspirin (Short breath)  divalproex sodium (Other (Unknown))  Flowers and Plants (Short breath)  Haldol (Other (Unknown))  penicillin (Short breath; Rash)  sulfa drugs (Short breath; Rash)  vancomycin (Rash; Urticaria; Hives)  Xanax (Other (Unknown))    PAST MEDICAL & SURGICAL HISTORY:  Type 2 diabetes mellitus  Dementia  DVT, lower extremity  CVA (cerebral vascular accident)  Fatty pancreas  PNA (pneumonia)  Pulmonary HTN  IGT (impaired glucose tolerance)  Ulcerative colitis  Acid reflux  Anxiety  Depression  Mouth sores  HLD (hyperlipidemia)  Asthma  S/P percutaneous endoscopic gastrostomy (PEG) tube placement: for dysphagia  Humeral head fracture  H/O: hysterectomy  H/O cataract extraction, left  History of knee replacement    FAMILY HISTORY:  Family history of dementia (Grandparent)  Family history of colon cancer (Grandparent)    MEDICATIONS  (STANDING):  ascorbic acid 500 milliGRAM(s) Oral daily  buDESOnide  80 MICROgram(s)/formoterol 4.5 MICROgram(s) Inhaler 2 Puff(s) Inhalation two times a day  cefepime   IVPB 1000 milliGRAM(s) IV Intermittent every 8 hours  chlorhexidine 2% Cloths 1 Application(s) Topical daily  clonazePAM  Tablet 1.5 milliGRAM(s) Oral at bedtime  dextrose 5% + sodium chloride 0.9%. 1000 milliLiter(s) (50 mL/Hr) IV Continuous <Continuous>  dextrose 5% + sodium chloride 0.9%. 1000 milliLiter(s) (50 mL/Hr) IV Continuous <Continuous>  dextrose 5%. 1000 milliLiter(s) (50 mL/Hr) IV Continuous <Continuous>  dextrose 50% Injectable 12.5 Gram(s) IV Push once  dextrose 50% Injectable 25 Gram(s) IV Push once  dextrose 50% Injectable 25 Gram(s) IV Push once  diphenhydrAMINE   Injectable 25 milliGRAM(s) IV Push two times a day  ergocalciferol Drops 5000 Unit(s) Oral daily  famotidine  IVPB 20 milliGRAM(s) IV Intermittent daily  ferrous    sulfate Liquid 300 milliGRAM(s) Enteral Tube daily  gabapentin 300 milliGRAM(s) Oral two times a day  heparin  Injectable 5000 Unit(s) SubCutaneous every 12 hours  insulin lispro (HumaLOG) corrective regimen sliding scale   SubCutaneous every 6 hours  Medical Marijuana [place] Oil 2 milliLiter(s) 2 milliLiter(s) Enteral Tube <User Schedule>  Medical Marijuana Calm Oil 2 milliLiter(s) 2 milliLiter(s) Enteral Tube <User Schedule>  Medical Marijuana [place] Oil 2 milliLiter(s) 2 milliLiter(s) Enteral Tube <User Schedule>  metroNIDAZOLE    Tablet 500 milliGRAM(s) Oral every 8 hours  midodrine 5 milliGRAM(s) Oral every 8 hours  multivitamin 1 Tablet(s) Oral daily  QUEtiapine 25 milliGRAM(s) Oral daily  tiotropium 18 MICROgram(s) Capsule 1 Capsule(s) Inhalation daily  tiZANidine 4 milliGRAM(s) Oral <User Schedule>  vancomycin  IVPB 500 milliGRAM(s) IV Intermittent every 12 hours  vancomycin  IVPB      zinc sulfate 220 milliGRAM(s) Oral daily    MEDICATIONS  (PRN):  acetaminophen  Suppository .. 650 milliGRAM(s) Rectal every 6 hours PRN Temp greater or equal to 38C (100.4F), Mild Pain (1 - 3)  ALBUTerol    90 MICROgram(s) HFA Inhaler 1 Puff(s) Inhalation every 4 hours PRN Shortness of Breath and/or Wheezing  ALBUTerol/ipratropium for Nebulization 3 milliLiter(s) Nebulizer every 6 hours PRN Shortness of Breath and/or Wheezing  carboxymethylcellulose 0.5% (Preservative-Free) Ophthalmic Solution 1 Drop(s) Both EYES three times a day PRN dry eyes  dextrose 40% Gel 15 Gram(s) Oral once PRN Blood Glucose LESS THAN 70 milliGRAM(s)/deciliter  glucagon  Injectable 1 milliGRAM(s) IntraMuscular once PRN Glucose LESS THAN 70 milligrams/deciliter  nystatin Powder 1 Application(s) Topical three times a day PRN under breasts      Vital Signs Last 24 Hrs  T(C): 37.4 (29 Jun 2019 05:29), Max: 38.6 (28 Jun 2019 17:30)  T(F): 99.3 (29 Jun 2019 05:29), Max: 101.5 (28 Jun 2019 17:30)  HR: 99 (29 Jun 2019 05:29) (92 - 120)  BP: 105/64 (29 Jun 2019 05:29) (91/52 - 116/65)  BP(mean): --  RR: 18 (29 Jun 2019 05:29) (17 - 18)  SpO2: 99% (29 Jun 2019 05:29) (94% - 100%)  nc/at  s1s2  cta  soft, nt, nd no guarding or rebound  no c/c/e    CBC Full  -  ( 28 Jun 2019 18:12 )  WBC Count : 11.5 K/uL  RBC Count : 3.14 M/uL  Hemoglobin : 9.9 g/dL  Hematocrit : 29.0 %  Platelet Count - Automated : 454 K/uL  Mean Cell Volume : 92.6 fl  Mean Cell Hemoglobin : 31.6 pg  Mean Cell Hemoglobin Concentration : 34.2 gm/dL  Auto Neutrophil # : x  Auto Lymphocyte # : x  Auto Monocyte # : x  Auto Eosinophil # : x  Auto Basophil # : x  Auto Neutrophil % : x  Auto Lymphocyte % : x  Auto Monocyte % : x  Auto Eosinophil % : x  Auto Basophil % : x    06-28    141  |  103  |  37<H>  ----------------------------<  136<H>  5.0   |  21<L>  |  0.55    Ca    9.2      28 Jun 2019 18:12

## 2019-06-29 NOTE — CHART NOTE - NSCHARTNOTEFT_GEN_A_CORE
MEDICINE PA    Notified by RN patient with temperature of 102'F. Seen and examined patient at bedside with presence of daughter. Patient is alert in NAD. Unable to access ROS due to mental status. As per daughter, patient has occasional cough but endorses overall improvement.     VITAL SIGNS:  T(C): 37.6 (06-29-19 @ 21:53), Max: 38.9 (06-29-19 @ 19:40)  HR: 92 (06-29-19 @ 19:40) (82 - 99)  BP: 103/54 (06-29-19 @ 19:40) (103/54 - 116/65)  RR: 18 (06-29-19 @ 19:40) (17 - 18)  SpO2: 99% (06-29-19 @ 19:40) (99% - 100%)  Wt(kg): --      LABORATORY:                          9.9    11.5  )-----------( 454      ( 28 Jun 2019 18:12 )             29.0       06-29    134<L>  |  101  |  36<H>  ----------------------------<  178<H>  4.3   |  19<L>  |  0.57    Ca    8.7      29 Jun 2019 10:34      MICROBIOLOGY:   Culture - Blood (06.28.19 @ 21:43)    Specimen Source: .Blood    Culture Results:   No growth to date.    Urinalysis (06.28.19 @ 04:14)    Glucose Qualitative, Urine: Negative    Blood, Urine: Small    pH Urine: 6.0    Color: Yellow    Urine Appearance: Slightly Turbid    Bilirubin: Negative    Ketone - Urine: Negative    Specific Gravity: 1.019    Protein, Urine: 100 mg/dL    Urobilinogen: <2 mg/dL    Nitrite: Negative    Leukocyte Esterase Concentration: Large      RADIOLOGY:  < from: Xray Chest 1 View- PORTABLE-Urgent (06.27.19 @ 21:43) >      PROCEDURE DATE:  06/27/2019            INTERPRETATION:  Clinical information: Recurrent fever.    Single AP radiograph of the chest was obtained and compared to previous   study of 6/24/2019.    Heart size cannot be adequately assessed on this portable AP exam. Lungs   are clear. Visualized osseous structures are within normal limits.   Probable gastrostomy tube is noted in place.    Impression: Clear lungs.      PHYSICAL EXAM:    Constitutional: AOx0. NAD, non-toxic appearance, contracted  Neuro: PERRLA, grossly intact, no focal deficits  Respiratory: clear lungs bilaterally. No wheezing, rhonchi, or crackles. non-labored  Cardiovascular: S1 S2. No murmurs.  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  thinks she must have pulled it out this morning. He says otherwise she was in her usual state of health at home. He says she has had some intermittent low grade fevers at home, which usually respond to Tylenol.  (24 Jun 2019 15:39). Patient now with recurrent fever.       # Fever       - Rectal Tylenol 650mg and cooling measures PRN for pyrexia       - f/u BC on 6/28; NGTD       - CXR on 6/27 shows clear lungs        - c/w current antimicrobial regimen       - ID on board, follow ID recs        - Continue close monitoring of clinical status and vital signs. HD stable.        - will endorse to primary team in AM    Addendum @ 2200       - temp improved to 99.6'F. Patient remains NAD. HD stable     Rosamaria Stiles PA-C

## 2019-06-30 LAB
ANION GAP SERPL CALC-SCNC: 10 MMOL/L — SIGNIFICANT CHANGE UP (ref 5–17)
BLD GP AB SCN SERPL QL: NEGATIVE — SIGNIFICANT CHANGE UP
BUN SERPL-MCNC: 38 MG/DL — HIGH (ref 7–23)
CALCIUM SERPL-MCNC: 8.9 MG/DL — SIGNIFICANT CHANGE UP (ref 8.4–10.5)
CHLORIDE SERPL-SCNC: 100 MMOL/L — SIGNIFICANT CHANGE UP (ref 96–108)
CO2 SERPL-SCNC: 22 MMOL/L — SIGNIFICANT CHANGE UP (ref 22–31)
CREAT SERPL-MCNC: 0.65 MG/DL — SIGNIFICANT CHANGE UP (ref 0.5–1.3)
GLUCOSE BLDC GLUCOMTR-MCNC: 106 MG/DL — HIGH (ref 70–99)
GLUCOSE BLDC GLUCOMTR-MCNC: 126 MG/DL — HIGH (ref 70–99)
GLUCOSE BLDC GLUCOMTR-MCNC: 139 MG/DL — HIGH (ref 70–99)
GLUCOSE BLDC GLUCOMTR-MCNC: 202 MG/DL — HIGH (ref 70–99)
GLUCOSE BLDC GLUCOMTR-MCNC: 83 MG/DL — SIGNIFICANT CHANGE UP (ref 70–99)
GLUCOSE SERPL-MCNC: 103 MG/DL — HIGH (ref 70–99)
HCT VFR BLD CALC: 20.8 % — CRITICAL LOW (ref 34.5–45)
HCT VFR BLD CALC: 23.2 % — LOW (ref 34.5–45)
HGB BLD-MCNC: 7.1 G/DL — LOW (ref 11.5–15.5)
HGB BLD-MCNC: 7.1 G/DL — LOW (ref 11.5–15.5)
MCHC RBC-ENTMCNC: 28.7 PG — SIGNIFICANT CHANGE UP (ref 27–34)
MCHC RBC-ENTMCNC: 30.6 GM/DL — LOW (ref 32–36)
MCHC RBC-ENTMCNC: 31.2 PG — SIGNIFICANT CHANGE UP (ref 27–34)
MCHC RBC-ENTMCNC: 34.2 GM/DL — SIGNIFICANT CHANGE UP (ref 32–36)
MCV RBC AUTO: 91.3 FL — SIGNIFICANT CHANGE UP (ref 80–100)
MCV RBC AUTO: 93.9 FL — SIGNIFICANT CHANGE UP (ref 80–100)
PLATELET # BLD AUTO: 323 K/UL — SIGNIFICANT CHANGE UP (ref 150–400)
PLATELET # BLD AUTO: 328 K/UL — SIGNIFICANT CHANGE UP (ref 150–400)
POTASSIUM SERPL-MCNC: 3.9 MMOL/L — SIGNIFICANT CHANGE UP (ref 3.5–5.3)
POTASSIUM SERPL-SCNC: 3.9 MMOL/L — SIGNIFICANT CHANGE UP (ref 3.5–5.3)
RBC # BLD: 2.28 M/UL — LOW (ref 3.8–5.2)
RBC # BLD: 2.47 M/UL — LOW (ref 3.8–5.2)
RBC # FLD: 14.2 % — SIGNIFICANT CHANGE UP (ref 10.3–14.5)
RBC # FLD: 15.5 % — HIGH (ref 10.3–14.5)
RH IG SCN BLD-IMP: NEGATIVE — SIGNIFICANT CHANGE UP
SODIUM SERPL-SCNC: 132 MMOL/L — LOW (ref 135–145)
VANCOMYCIN TROUGH SERPL-MCNC: 15.4 UG/ML — SIGNIFICANT CHANGE UP (ref 10–20)
WBC # BLD: 7.6 K/UL — SIGNIFICANT CHANGE UP (ref 3.8–10.5)
WBC # BLD: 8.43 K/UL — SIGNIFICANT CHANGE UP (ref 3.8–10.5)
WBC # FLD AUTO: 7.6 K/UL — SIGNIFICANT CHANGE UP (ref 3.8–10.5)
WBC # FLD AUTO: 8.43 K/UL — SIGNIFICANT CHANGE UP (ref 3.8–10.5)

## 2019-06-30 RX ADMIN — TIOTROPIUM BROMIDE 1 CAPSULE(S): 18 CAPSULE ORAL; RESPIRATORY (INHALATION) at 12:05

## 2019-06-30 RX ADMIN — TIZANIDINE 4 MILLIGRAM(S): 4 TABLET ORAL at 09:37

## 2019-06-30 RX ADMIN — Medication 100 MILLIGRAM(S): at 10:46

## 2019-06-30 RX ADMIN — CEFEPIME 100 MILLIGRAM(S): 1 INJECTION, POWDER, FOR SOLUTION INTRAMUSCULAR; INTRAVENOUS at 09:29

## 2019-06-30 RX ADMIN — Medication 500 MILLIGRAM(S): at 12:03

## 2019-06-30 RX ADMIN — CEFEPIME 100 MILLIGRAM(S): 1 INJECTION, POWDER, FOR SOLUTION INTRAMUSCULAR; INTRAVENOUS at 16:06

## 2019-06-30 RX ADMIN — Medication 1 TABLET(S): at 12:05

## 2019-06-30 RX ADMIN — Medication 100 MILLIGRAM(S): at 23:12

## 2019-06-30 RX ADMIN — QUETIAPINE FUMARATE 25 MILLIGRAM(S): 200 TABLET, FILM COATED ORAL at 21:28

## 2019-06-30 RX ADMIN — MIDODRINE HYDROCHLORIDE 5 MILLIGRAM(S): 2.5 TABLET ORAL at 21:28

## 2019-06-30 RX ADMIN — BUDESONIDE AND FORMOTEROL FUMARATE DIHYDRATE 2 PUFF(S): 160; 4.5 AEROSOL RESPIRATORY (INHALATION) at 17:48

## 2019-06-30 RX ADMIN — TIZANIDINE 4 MILLIGRAM(S): 4 TABLET ORAL at 20:48

## 2019-06-30 RX ADMIN — CEFEPIME 100 MILLIGRAM(S): 1 INJECTION, POWDER, FOR SOLUTION INTRAMUSCULAR; INTRAVENOUS at 00:17

## 2019-06-30 RX ADMIN — Medication 650 MILLIGRAM(S): at 20:48

## 2019-06-30 RX ADMIN — Medication 650 MILLIGRAM(S): at 21:18

## 2019-06-30 RX ADMIN — Medication 25 MILLIGRAM(S): at 21:37

## 2019-06-30 RX ADMIN — Medication 1.5 MILLIGRAM(S): at 21:27

## 2019-06-30 RX ADMIN — GABAPENTIN 300 MILLIGRAM(S): 400 CAPSULE ORAL at 20:48

## 2019-06-30 RX ADMIN — BUDESONIDE AND FORMOTEROL FUMARATE DIHYDRATE 2 PUFF(S): 160; 4.5 AEROSOL RESPIRATORY (INHALATION) at 06:17

## 2019-06-30 RX ADMIN — MIDODRINE HYDROCHLORIDE 5 MILLIGRAM(S): 2.5 TABLET ORAL at 06:16

## 2019-06-30 RX ADMIN — Medication 300 MILLIGRAM(S): at 12:04

## 2019-06-30 RX ADMIN — FAMOTIDINE 100 MILLIGRAM(S): 10 INJECTION INTRAVENOUS at 12:04

## 2019-06-30 RX ADMIN — ERGOCALCIFEROL 5000 UNIT(S): 1.25 CAPSULE ORAL at 12:03

## 2019-06-30 RX ADMIN — HEPARIN SODIUM 5000 UNIT(S): 5000 INJECTION INTRAVENOUS; SUBCUTANEOUS at 06:16

## 2019-06-30 RX ADMIN — Medication 2: at 00:23

## 2019-06-30 RX ADMIN — GABAPENTIN 300 MILLIGRAM(S): 400 CAPSULE ORAL at 09:36

## 2019-06-30 RX ADMIN — MIDODRINE HYDROCHLORIDE 5 MILLIGRAM(S): 2.5 TABLET ORAL at 14:59

## 2019-06-30 RX ADMIN — Medication 500 MILLIGRAM(S): at 06:18

## 2019-06-30 RX ADMIN — Medication 25 MILLIGRAM(S): at 10:43

## 2019-06-30 RX ADMIN — ZINC SULFATE TAB 220 MG (50 MG ZINC EQUIVALENT) 220 MILLIGRAM(S): 220 (50 ZN) TAB at 12:06

## 2019-06-30 RX ADMIN — HEPARIN SODIUM 5000 UNIT(S): 5000 INJECTION INTRAVENOUS; SUBCUTANEOUS at 17:49

## 2019-06-30 RX ADMIN — CHLORHEXIDINE GLUCONATE 1 APPLICATION(S): 213 SOLUTION TOPICAL at 12:01

## 2019-06-30 NOTE — PROGRESS NOTE ADULT - SUBJECTIVE AND OBJECTIVE BOX
MYRIAM WHITE:[id number],   69yFemale followed for:  ASA; dye contrast (Anaphylaxis)  aspirin (Short breath)  divalproex sodium (Other (Unknown))  Flowers and Plants (Short breath)  Haldol (Other (Unknown))  penicillin (Short breath; Rash)  sulfa drugs (Short breath; Rash)  vancomycin (Rash; Urticaria; Hives)  Xanax (Other (Unknown))    PAST MEDICAL & SURGICAL HISTORY:  Type 2 diabetes mellitus  Dementia  DVT, lower extremity  CVA (cerebral vascular accident)  Fatty pancreas  PNA (pneumonia)  Pulmonary HTN  IGT (impaired glucose tolerance)  Ulcerative colitis  Acid reflux  Anxiety  Depression  Mouth sores  HLD (hyperlipidemia)  Asthma  S/P percutaneous endoscopic gastrostomy (PEG) tube placement: for dysphagia  Humeral head fracture  H/O: hysterectomy  H/O cataract extraction, left  History of knee replacement    FAMILY HISTORY:  Family history of dementia (Grandparent)  Family history of colon cancer (Grandparent)    MEDICATIONS  (STANDING):  ascorbic acid 500 milliGRAM(s) Oral daily  buDESOnide  80 MICROgram(s)/formoterol 4.5 MICROgram(s) Inhaler 2 Puff(s) Inhalation two times a day  cefepime   IVPB 1000 milliGRAM(s) IV Intermittent every 8 hours  chlorhexidine 2% Cloths 1 Application(s) Topical daily  clonazePAM  Tablet 1.5 milliGRAM(s) Oral at bedtime  dextrose 5% + sodium chloride 0.9%. 1000 milliLiter(s) (50 mL/Hr) IV Continuous <Continuous>  dextrose 5% + sodium chloride 0.9%. 1000 milliLiter(s) (50 mL/Hr) IV Continuous <Continuous>  dextrose 5%. 1000 milliLiter(s) (50 mL/Hr) IV Continuous <Continuous>  dextrose 50% Injectable 12.5 Gram(s) IV Push once  dextrose 50% Injectable 25 Gram(s) IV Push once  dextrose 50% Injectable 25 Gram(s) IV Push once  diphenhydrAMINE   Injectable 25 milliGRAM(s) IV Push two times a day  ergocalciferol Drops 5000 Unit(s) Oral daily  famotidine  IVPB 20 milliGRAM(s) IV Intermittent daily  ferrous    sulfate Liquid 300 milliGRAM(s) Enteral Tube daily  gabapentin 300 milliGRAM(s) Oral two times a day  heparin  Injectable 5000 Unit(s) SubCutaneous every 12 hours  insulin lispro (HumaLOG) corrective regimen sliding scale   SubCutaneous every 6 hours  Medical Marijuana Awake Oil 2 milliLiter(s) 2 milliLiter(s) Enteral Tube <User Schedule>  Medical Marijuana Calm Oil 2 milliLiter(s) 2 milliLiter(s) Enteral Tube <User Schedule>  Medical Marijuana [place] Oil 2 milliLiter(s) 2 milliLiter(s) Enteral Tube <User Schedule>  midodrine 5 milliGRAM(s) Oral every 8 hours  multivitamin 1 Tablet(s) Oral daily  QUEtiapine 25 milliGRAM(s) Oral daily  tiotropium 18 MICROgram(s) Capsule 1 Capsule(s) Inhalation daily  tiZANidine 4 milliGRAM(s) Oral <User Schedule>  vancomycin  IVPB 500 milliGRAM(s) IV Intermittent every 12 hours  vancomycin  IVPB      zinc sulfate 220 milliGRAM(s) Oral daily    MEDICATIONS  (PRN):  acetaminophen  Suppository .. 650 milliGRAM(s) Rectal every 6 hours PRN Temp greater or equal to 38C (100.4F), Mild Pain (1 - 3)  ALBUTerol    90 MICROgram(s) HFA Inhaler 1 Puff(s) Inhalation every 4 hours PRN Shortness of Breath and/or Wheezing  ALBUTerol/ipratropium for Nebulization 3 milliLiter(s) Nebulizer every 6 hours PRN Shortness of Breath and/or Wheezing  carboxymethylcellulose 0.5% (Preservative-Free) Ophthalmic Solution 1 Drop(s) Both EYES three times a day PRN dry eyes  dextrose 40% Gel 15 Gram(s) Oral once PRN Blood Glucose LESS THAN 70 milliGRAM(s)/deciliter  glucagon  Injectable 1 milliGRAM(s) IntraMuscular once PRN Glucose LESS THAN 70 milligrams/deciliter  nystatin Powder 1 Application(s) Topical three times a day PRN under breasts      Vital Signs Last 24 Hrs  T(C): 36.8 (30 Jun 2019 06:22), Max: 38.9 (29 Jun 2019 19:40)  T(F): 98.2 (30 Jun 2019 06:22), Max: 102 (29 Jun 2019 19:40)  HR: 86 (30 Jun 2019 06:22) (79 - 92)  BP: 101/57 (30 Jun 2019 06:22) (100/63 - 115/69)  BP(mean): --  RR: 18 (30 Jun 2019 06:22) (18 - 18)  SpO2: 97% (30 Jun 2019 06:22) (96% - 100%)  nc/at  s1s2  cta  soft, nt, nd no guarding or rebound  no c/c/e    CBC Full  -  ( 28 Jun 2019 18:12 )  WBC Count : 11.5 K/uL  RBC Count : 3.14 M/uL  Hemoglobin : 9.9 g/dL  Hematocrit : 29.0 %  Platelet Count - Automated : 454 K/uL  Mean Cell Volume : 92.6 fl  Mean Cell Hemoglobin : 31.6 pg  Mean Cell Hemoglobin Concentration : 34.2 gm/dL  Auto Neutrophil # : x  Auto Lymphocyte # : x  Auto Monocyte # : x  Auto Eosinophil # : x  Auto Basophil # : x  Auto Neutrophil % : x  Auto Lymphocyte % : x  Auto Monocyte % : x  Auto Eosinophil % : x  Auto Basophil % : x    06-30    132<L>  |  100  |  38<H>  ----------------------------<  103<H>  3.9   |  22  |  0.65    Ca    8.9      30 Jun 2019 07:08

## 2019-06-30 NOTE — PROGRESS NOTE ADULT - SUBJECTIVE AND OBJECTIVE BOX
---___---___---___---___---___---___ ---___---___---___---___---___---___---___---___---                  M E D I C A L   A T T E N D I N G   P R O G R E S S   N O T E  ---___---___---___---___---___---___ ---___---___---___---___---___---___---___---___---        ================================================    ++CHIEF COMPLAINT:   Patient is a 69y old  Female who presents with a chief complaint of PEG tube dislodgement, fever, leukocytosis (2019 16:45)      Gastrostomy malfunction now with fever     ---___---___---___---___---___---  PAST MEDICAL / Surgical  HISTORY:  PAST MEDICAL & SURGICAL HISTORY:  Type 2 diabetes mellitus  Dementia  DVT, lower extremity  CVA (cerebral vascular accident)  Fatty pancreas  PNA (pneumonia)  Pulmonary HTN  IGT (impaired glucose tolerance)  Ulcerative colitis  Acid reflux  Anxiety  Depression  Mouth sores  HLD (hyperlipidemia)  Asthma  S/P percutaneous endoscopic gastrostomy (PEG) tube placement: for dysphagia  Humeral head fracture  H/O: hysterectomy  H/O cataract extraction, left  History of knee replacement      ---___---___---___---___---___---  FAMILY HISTORY:   FAMILY HISTORY:  Family history of dementia (Grandparent)  Family history of colon cancer (Grandparent)        ---___---___---___---___---___---  ALLERGIES:   Allergies    ASA; dye contrast (Anaphylaxis)  aspirin (Short breath)  divalproex sodium (Other (Unknown))  Flowers and Plants (Short breath)  Haldol (Other (Unknown))  penicillin (Short breath; Rash)  sulfa drugs (Short breath; Rash)  vancomycin (Rash; Urticaria; Hives)  Xanax (Other (Unknown))    Intolerances        ---___---___---___---___---___---  MEDICATIONS:  MEDICATIONS  (STANDING):  ascorbic acid 500 milliGRAM(s) Oral daily  buDESOnide  80 MICROgram(s)/formoterol 4.5 MICROgram(s) Inhaler 2 Puff(s) Inhalation two times a day  cefepime   IVPB 1000 milliGRAM(s) IV Intermittent every 8 hours  chlorhexidine 2% Cloths 1 Application(s) Topical daily  clonazePAM  Tablet 1.5 milliGRAM(s) Oral at bedtime  dextrose 5% + sodium chloride 0.9%. 1000 milliLiter(s) (50 mL/Hr) IV Continuous <Continuous>  dextrose 5% + sodium chloride 0.9%. 1000 milliLiter(s) (50 mL/Hr) IV Continuous <Continuous>  dextrose 5%. 1000 milliLiter(s) (50 mL/Hr) IV Continuous <Continuous>  dextrose 50% Injectable 12.5 Gram(s) IV Push once  dextrose 50% Injectable 25 Gram(s) IV Push once  dextrose 50% Injectable 25 Gram(s) IV Push once  diphenhydrAMINE   Injectable 25 milliGRAM(s) IV Push two times a day  ergocalciferol Drops 5000 Unit(s) Oral daily  famotidine  IVPB 20 milliGRAM(s) IV Intermittent daily  ferrous    sulfate Liquid 300 milliGRAM(s) Enteral Tube daily  gabapentin 300 milliGRAM(s) Oral two times a day  heparin  Injectable 5000 Unit(s) SubCutaneous every 12 hours  insulin lispro (HumaLOG) corrective regimen sliding scale   SubCutaneous every 6 hours  Medical Marijuana Awake Oil 2 milliLiter(s) 2 milliLiter(s) Enteral Tube <User Schedule>  Medical Marijuana Calm Oil 2 milliLiter(s) 2 milliLiter(s) Enteral Tube <User Schedule>  Medical Marijuana [place] Oil 2 milliLiter(s) 2 milliLiter(s) Enteral Tube <User Schedule>  midodrine 5 milliGRAM(s) Oral every 8 hours  multivitamin 1 Tablet(s) Oral daily  QUEtiapine 25 milliGRAM(s) Oral daily  tiotropium 18 MICROgram(s) Capsule 1 Capsule(s) Inhalation daily  tiZANidine 4 milliGRAM(s) Oral <User Schedule>  vancomycin  IVPB 500 milliGRAM(s) IV Intermittent every 12 hours  vancomycin  IVPB      zinc sulfate 220 milliGRAM(s) Oral daily    MEDICATIONS  (PRN):  acetaminophen  Suppository .. 650 milliGRAM(s) Rectal every 6 hours PRN Temp greater or equal to 38C (100.4F), Mild Pain (1 - 3)  ALBUTerol    90 MICROgram(s) HFA Inhaler 1 Puff(s) Inhalation every 4 hours PRN Shortness of Breath and/or Wheezing  ALBUTerol/ipratropium for Nebulization 3 milliLiter(s) Nebulizer every 6 hours PRN Shortness of Breath and/or Wheezing  carboxymethylcellulose 0.5% (Preservative-Free) Ophthalmic Solution 1 Drop(s) Both EYES three times a day PRN dry eyes  dextrose 40% Gel 15 Gram(s) Oral once PRN Blood Glucose LESS THAN 70 milliGRAM(s)/deciliter  glucagon  Injectable 1 milliGRAM(s) IntraMuscular once PRN Glucose LESS THAN 70 milligrams/deciliter  nystatin Powder 1 Application(s) Topical three times a day PRN under breasts      ---___---___---___---___---___---  REVIEW OF SYSTEM:  unable to obtain     ---___---___---___---___---___---  VITAL SIGNS:  69y , CAPILLARY BLOOD GLUCOSE      POCT Blood Glucose.: 106 mg/dL (2019 06:15)    T(C): 36.8 (19 @ 06:22), Max: 38.9 (19 @ 19:40)  HR: 86 (19 @ 06:22) (79 - 92)  BP: 101/57 (19 @ 06:22) (100/63 - 115/69)  RR: 18 (19 @ 06:22) (18 - 18)  SpO2: 97% (19 @ 06:22) (96% - 100%)  ---___---___---___---___---___---  PHYSICAL EXAM:    GEN: A&O X 0 , NAD , comfortable  HEENT: NCAT, PERRL, MMM, hearing intact  Neck: supple , no JVD  CVS: S1S2 , regular , No M/R/G appreciated  PULM: CTA B/L,  no W/R/R appreciated  ABD.: soft. non tender, non distended,  bowel sounds present peg noted   Extrem: intact pulses ,  edema   Derm: No rash , no ecchymoses sacral decubitus noted  PSYCH : normal mood,  no delusion not anxious     ---___---___---___---___---___---            LAB AND IMAGIN.9    11.5  )-----------( 454      ( 2019 18:12 )             29.0               06-    134<L>  |  101  |  36<H>  ----------------------------<  178<H>  4.3   |  19<L>  |  0.57    Ca    8.7      2019 10:34                                  [All pertinent / recent Imaging reviewed]         ---___---___---___---___---___---___ ---___---___---___---___---                         A S S E S S M E N T   A N D   P L A N :      HEALTH ISSUES - PROBLEM Dx:    fever   leukocytosis  continue abx as per id   chronic gavin catheter continue to maintain   stage 4 sacral decubitus continue wound care   dm2 continue insulin   asthma on budesonide   anxiety / agitation continue klonopin /seroquel  chronic pain from ultiple condition o9n medical marijuana  -GI/DVT Prophylaxis. on heparin          --------------------------------------------  Case discussed with   Education given on   ___________________________  Thank you,  Deniz Bolden  [phone]

## 2019-07-01 DIAGNOSIS — R50.9 FEVER, UNSPECIFIED: ICD-10-CM

## 2019-07-01 LAB
ANION GAP SERPL CALC-SCNC: 11 MMOL/L — SIGNIFICANT CHANGE UP (ref 5–17)
BASOPHILS # BLD AUTO: 0 K/UL — SIGNIFICANT CHANGE UP (ref 0–0.2)
BASOPHILS NFR BLD AUTO: 0.4 % — SIGNIFICANT CHANGE UP (ref 0–2)
BUN SERPL-MCNC: 38 MG/DL — HIGH (ref 7–23)
CALCIUM SERPL-MCNC: 9.3 MG/DL — SIGNIFICANT CHANGE UP (ref 8.4–10.5)
CHLORIDE SERPL-SCNC: 99 MMOL/L — SIGNIFICANT CHANGE UP (ref 96–108)
CO2 SERPL-SCNC: 23 MMOL/L — SIGNIFICANT CHANGE UP (ref 22–31)
CREAT SERPL-MCNC: 0.65 MG/DL — SIGNIFICANT CHANGE UP (ref 0.5–1.3)
CULTURE RESULTS: SIGNIFICANT CHANGE UP
CULTURE RESULTS: SIGNIFICANT CHANGE UP
EOSINOPHIL # BLD AUTO: 0.5 K/UL — SIGNIFICANT CHANGE UP (ref 0–0.5)
EOSINOPHIL NFR BLD AUTO: 5.1 % — SIGNIFICANT CHANGE UP (ref 0–6)
GLUCOSE BLDC GLUCOMTR-MCNC: 100 MG/DL — HIGH (ref 70–99)
GLUCOSE BLDC GLUCOMTR-MCNC: 117 MG/DL — HIGH (ref 70–99)
GLUCOSE BLDC GLUCOMTR-MCNC: 117 MG/DL — HIGH (ref 70–99)
GLUCOSE BLDC GLUCOMTR-MCNC: 121 MG/DL — HIGH (ref 70–99)
GLUCOSE BLDC GLUCOMTR-MCNC: 141 MG/DL — HIGH (ref 70–99)
GLUCOSE BLDC GLUCOMTR-MCNC: 54 MG/DL — LOW (ref 70–99)
GLUCOSE BLDC GLUCOMTR-MCNC: 58 MG/DL — LOW (ref 70–99)
GLUCOSE SERPL-MCNC: 103 MG/DL — HIGH (ref 70–99)
HCT VFR BLD CALC: 30.9 % — LOW (ref 34.5–45)
HGB BLD-MCNC: 10.1 G/DL — LOW (ref 11.5–15.5)
LYMPHOCYTES # BLD AUTO: 0.8 K/UL — LOW (ref 1–3.3)
LYMPHOCYTES # BLD AUTO: 8.8 % — LOW (ref 13–44)
MCHC RBC-ENTMCNC: 29.7 PG — SIGNIFICANT CHANGE UP (ref 27–34)
MCHC RBC-ENTMCNC: 32.7 GM/DL — SIGNIFICANT CHANGE UP (ref 32–36)
MCV RBC AUTO: 90.8 FL — SIGNIFICANT CHANGE UP (ref 80–100)
MONOCYTES # BLD AUTO: 1 K/UL — HIGH (ref 0–0.9)
MONOCYTES NFR BLD AUTO: 11.1 % — SIGNIFICANT CHANGE UP (ref 2–14)
NEUTROPHILS # BLD AUTO: 6.6 K/UL — SIGNIFICANT CHANGE UP (ref 1.8–7.4)
NEUTROPHILS NFR BLD AUTO: 74.6 % — SIGNIFICANT CHANGE UP (ref 43–77)
OB PNL STL: NEGATIVE — SIGNIFICANT CHANGE UP
PLATELET # BLD AUTO: 367 K/UL — SIGNIFICANT CHANGE UP (ref 150–400)
POTASSIUM SERPL-MCNC: 4.2 MMOL/L — SIGNIFICANT CHANGE UP (ref 3.5–5.3)
POTASSIUM SERPL-SCNC: 4.2 MMOL/L — SIGNIFICANT CHANGE UP (ref 3.5–5.3)
RBC # BLD: 3.4 M/UL — LOW (ref 3.8–5.2)
RBC # FLD: 14.2 % — SIGNIFICANT CHANGE UP (ref 10.3–14.5)
SODIUM SERPL-SCNC: 133 MMOL/L — LOW (ref 135–145)
SPECIMEN SOURCE: SIGNIFICANT CHANGE UP
SPECIMEN SOURCE: SIGNIFICANT CHANGE UP
WBC # BLD: 8.9 K/UL — SIGNIFICANT CHANGE UP (ref 3.8–10.5)
WBC # FLD AUTO: 8.9 K/UL — SIGNIFICANT CHANGE UP (ref 3.8–10.5)

## 2019-07-01 PROCEDURE — 99232 SBSQ HOSP IP/OBS MODERATE 35: CPT

## 2019-07-01 PROCEDURE — 71250 CT THORAX DX C-: CPT | Mod: 26

## 2019-07-01 PROCEDURE — 74176 CT ABD & PELVIS W/O CONTRAST: CPT | Mod: 26

## 2019-07-01 RX ORDER — DEXTROSE 50 % IN WATER 50 %
12.5 SYRINGE (ML) INTRAVENOUS ONCE
Refills: 0 | Status: COMPLETED | OUTPATIENT
Start: 2019-07-01 | End: 2019-07-01

## 2019-07-01 RX ADMIN — GABAPENTIN 300 MILLIGRAM(S): 400 CAPSULE ORAL at 09:16

## 2019-07-01 RX ADMIN — Medication 100 MILLIGRAM(S): at 10:12

## 2019-07-01 RX ADMIN — ERGOCALCIFEROL 5000 UNIT(S): 1.25 CAPSULE ORAL at 21:42

## 2019-07-01 RX ADMIN — TIZANIDINE 4 MILLIGRAM(S): 4 TABLET ORAL at 09:16

## 2019-07-01 RX ADMIN — CEFEPIME 100 MILLIGRAM(S): 1 INJECTION, POWDER, FOR SOLUTION INTRAMUSCULAR; INTRAVENOUS at 09:05

## 2019-07-01 RX ADMIN — Medication 25 MILLIGRAM(S): at 10:11

## 2019-07-01 RX ADMIN — ZINC SULFATE TAB 220 MG (50 MG ZINC EQUIVALENT) 220 MILLIGRAM(S): 220 (50 ZN) TAB at 13:02

## 2019-07-01 RX ADMIN — MIDODRINE HYDROCHLORIDE 5 MILLIGRAM(S): 2.5 TABLET ORAL at 06:45

## 2019-07-01 RX ADMIN — CHLORHEXIDINE GLUCONATE 1 APPLICATION(S): 213 SOLUTION TOPICAL at 13:17

## 2019-07-01 RX ADMIN — TIZANIDINE 4 MILLIGRAM(S): 4 TABLET ORAL at 21:42

## 2019-07-01 RX ADMIN — MIDODRINE HYDROCHLORIDE 5 MILLIGRAM(S): 2.5 TABLET ORAL at 13:03

## 2019-07-01 RX ADMIN — CEFEPIME 100 MILLIGRAM(S): 1 INJECTION, POWDER, FOR SOLUTION INTRAMUSCULAR; INTRAVENOUS at 18:16

## 2019-07-01 RX ADMIN — Medication 1 TABLET(S): at 13:03

## 2019-07-01 RX ADMIN — Medication 650 MILLIGRAM(S): at 18:19

## 2019-07-01 RX ADMIN — CEFEPIME 100 MILLIGRAM(S): 1 INJECTION, POWDER, FOR SOLUTION INTRAMUSCULAR; INTRAVENOUS at 03:44

## 2019-07-01 RX ADMIN — HEPARIN SODIUM 5000 UNIT(S): 5000 INJECTION INTRAVENOUS; SUBCUTANEOUS at 18:19

## 2019-07-01 RX ADMIN — Medication 100 MILLIGRAM(S): at 22:46

## 2019-07-01 RX ADMIN — GABAPENTIN 300 MILLIGRAM(S): 400 CAPSULE ORAL at 21:42

## 2019-07-01 RX ADMIN — FAMOTIDINE 100 MILLIGRAM(S): 10 INJECTION INTRAVENOUS at 13:02

## 2019-07-01 RX ADMIN — Medication 25 MILLIGRAM(S): at 21:42

## 2019-07-01 RX ADMIN — QUETIAPINE FUMARATE 25 MILLIGRAM(S): 200 TABLET, FILM COATED ORAL at 21:42

## 2019-07-01 RX ADMIN — HEPARIN SODIUM 5000 UNIT(S): 5000 INJECTION INTRAVENOUS; SUBCUTANEOUS at 06:45

## 2019-07-01 RX ADMIN — Medication 1.5 MILLIGRAM(S): at 21:41

## 2019-07-01 RX ADMIN — Medication 12.5 GRAM(S): at 17:41

## 2019-07-01 RX ADMIN — MIDODRINE HYDROCHLORIDE 5 MILLIGRAM(S): 2.5 TABLET ORAL at 21:42

## 2019-07-01 RX ADMIN — Medication 300 MILLIGRAM(S): at 13:02

## 2019-07-01 RX ADMIN — Medication 650 MILLIGRAM(S): at 19:10

## 2019-07-01 RX ADMIN — Medication 500 MILLIGRAM(S): at 13:03

## 2019-07-01 NOTE — PROGRESS NOTE ADULT - SUBJECTIVE AND OBJECTIVE BOX
KUMAR, MYRIAM 69y [id number]    Patient is a 69y old  Female who presents with a chief complaint of PEG tube dislodgement, fever, leukocytosis (01 Jul 2019 08:16)      Follow Up/CC:  ID following for fever    Interval History/ROS: fever+, got PRBC overnight    Allergies    ASA; dye contrast (Anaphylaxis)  aspirin (Short breath)  divalproex sodium (Other (Unknown))  Flowers and Plants (Short breath)  Haldol (Other (Unknown))  penicillin (Short breath; Rash)  sulfa drugs (Short breath; Rash)  vancomycin (Rash; Urticaria; Hives)  Xanax (Other (Unknown))    Intolerances        ANTIMICROBIALS:  cefepime   IVPB 1000 every 8 hours  vancomycin  IVPB 500 every 12 hours  vancomycin  IVPB        MEDICATIONS  (STANDING):  ascorbic acid 500 milliGRAM(s) Oral daily  buDESOnide  80 MICROgram(s)/formoterol 4.5 MICROgram(s) Inhaler 2 Puff(s) Inhalation two times a day  cefepime   IVPB 1000 milliGRAM(s) IV Intermittent every 8 hours  chlorhexidine 2% Cloths 1 Application(s) Topical daily  clonazePAM  Tablet 1.5 milliGRAM(s) Oral at bedtime  dextrose 5% + sodium chloride 0.9%. 1000 milliLiter(s) (50 mL/Hr) IV Continuous <Continuous>  dextrose 5% + sodium chloride 0.9%. 1000 milliLiter(s) (50 mL/Hr) IV Continuous <Continuous>  dextrose 5%. 1000 milliLiter(s) (50 mL/Hr) IV Continuous <Continuous>  dextrose 50% Injectable 12.5 Gram(s) IV Push once  dextrose 50% Injectable 25 Gram(s) IV Push once  dextrose 50% Injectable 25 Gram(s) IV Push once  diphenhydrAMINE   Injectable 25 milliGRAM(s) IV Push two times a day  ergocalciferol Drops 5000 Unit(s) Oral daily  famotidine  IVPB 20 milliGRAM(s) IV Intermittent daily  ferrous    sulfate Liquid 300 milliGRAM(s) Enteral Tube daily  gabapentin 300 milliGRAM(s) Oral two times a day  heparin  Injectable 5000 Unit(s) SubCutaneous every 12 hours  insulin lispro (HumaLOG) corrective regimen sliding scale   SubCutaneous every 6 hours  Medical Marijuana Awake Oil 2 milliLiter(s) 2 milliLiter(s) Enteral Tube <User Schedule>  Medical Marijuana Calm Oil 2 milliLiter(s) 2 milliLiter(s) Enteral Tube <User Schedule>  Medical Marijuana [place] Oil 2 milliLiter(s) 2 milliLiter(s) Enteral Tube <User Schedule>  midodrine 5 milliGRAM(s) Oral every 8 hours  multivitamin 1 Tablet(s) Oral daily  QUEtiapine 25 milliGRAM(s) Oral daily  tiotropium 18 MICROgram(s) Capsule 1 Capsule(s) Inhalation daily  tiZANidine 4 milliGRAM(s) Oral <User Schedule>  vancomycin  IVPB 500 milliGRAM(s) IV Intermittent every 12 hours  vancomycin  IVPB      zinc sulfate 220 milliGRAM(s) Oral daily    MEDICATIONS  (PRN):  acetaminophen  Suppository .. 650 milliGRAM(s) Rectal every 6 hours PRN Temp greater or equal to 38C (100.4F), Mild Pain (1 - 3)  ALBUTerol    90 MICROgram(s) HFA Inhaler 1 Puff(s) Inhalation every 4 hours PRN Shortness of Breath and/or Wheezing  ALBUTerol/ipratropium for Nebulization 3 milliLiter(s) Nebulizer every 6 hours PRN Shortness of Breath and/or Wheezing  carboxymethylcellulose 0.5% (Preservative-Free) Ophthalmic Solution 1 Drop(s) Both EYES three times a day PRN dry eyes  dextrose 40% Gel 15 Gram(s) Oral once PRN Blood Glucose LESS THAN 70 milliGRAM(s)/deciliter  glucagon  Injectable 1 milliGRAM(s) IntraMuscular once PRN Glucose LESS THAN 70 milligrams/deciliter  nystatin Powder 1 Application(s) Topical three times a day PRN under breasts        Vital Signs Last 24 Hrs  T(C): 36.5 (01 Jul 2019 12:17), Max: 38.7 (30 Jun 2019 21:21)  T(F): 97.7 (01 Jul 2019 12:17), Max: 101.7 (30 Jun 2019 21:21)  HR: 82 (01 Jul 2019 12:17) (75 - 112)  BP: 104/58 (01 Jul 2019 12:17) (85/59 - 107/66)  BP(mean): --  RR: 18 (01 Jul 2019 12:17) (18 - 18)  SpO2: 97% (01 Jul 2019 06:56) (95% - 100%)    CBC Full  -  ( 01 Jul 2019 08:51 )  WBC Count : 8.9 K/uL  RBC Count : 3.40 M/uL  Hemoglobin : 10.1 g/dL  Hematocrit : 30.9 %  Platelet Count - Automated : 367 K/uL  Mean Cell Volume : 90.8 fl  Mean Cell Hemoglobin : 29.7 pg  Mean Cell Hemoglobin Concentration : 32.7 gm/dL  Auto Neutrophil # : 6.6 K/uL  Auto Lymphocyte # : 0.8 K/uL  Auto Monocyte # : 1.0 K/uL  Auto Eosinophil # : 0.5 K/uL  Auto Basophil # : 0.0 K/uL  Auto Neutrophil % : 74.6 %  Auto Lymphocyte % : 8.8 %  Auto Monocyte % : 11.1 %  Auto Eosinophil % : 5.1 %  Auto Basophil % : 0.4 %    07-01    133<L>  |  99  |  38<H>  ----------------------------<  103<H>  4.2   |  23  |  0.65    Ca    9.3      01 Jul 2019 08:34            MICROBIOLOGY:  .Blood  06-28-19   No growth to date.  --  --      .Blood  06-26-19   No growth to date.  --  --      .Blood  06-24-19   No growth at 5 days.  --  --      .Blood  06-18-19   No growth at 5 days.  --  --      .Blood  06-17-19   No growth at 5 days.  --  --      .Urine  06-13-19   >100,000 CFU/ml Klebsiella oxytoca  >100,000 CFU/ml Pseudomonas aeruginosa (Carbapenem Resistant)  --  Klebsiella oxytoca  Pseudomonas aeruginosa (Carbapenem Resistant)      .Blood  06-13-19   Growth in anaerobic bottle: Staphylococcus epidermidis  --  Staphylococcus epidermidis      RADIOLOGY    < from: Xray Chest 1 View- PORTABLE-Urgent (06.27.19 @ 21:43) >  Clear lungs.        < from: CT Abdomen and Pelvis w/ Oral Cont (06.24.19 @ 12:16) >  Malpositioned PEG tube with balloon and tip in the left ventral abdominal   wall with small surrounding loculated fluid. No bowel obstruction.      < end of copied text >

## 2019-07-01 NOTE — PROGRESS NOTE ADULT - SUBJECTIVE AND OBJECTIVE BOX
---___---___---___---___---___---___ ---___---___---___---___---___---___---___---___---                  M E D I C A L   A T T E N D I N G   P R O G R E S S   N O T E  ---___---___---___---___---___---___ ---___---___---___---___---___---___---___---___---        ================================================    ++CHIEF COMPLAINT:   Patient is a 69y old  Female who presents with a chief complaint of PEG tube dislodgement, fever, leukocytosis (2019 08:43)      still febrile   ---___---___---___---___---___---  PAST MEDICAL / Surgical  HISTORY:  PAST MEDICAL & SURGICAL HISTORY:  Type 2 diabetes mellitus  Dementia  DVT, lower extremity  CVA (cerebral vascular accident)  Fatty pancreas  PNA (pneumonia)  Pulmonary HTN  IGT (impaired glucose tolerance)  Ulcerative colitis  Acid reflux  Anxiety  Depression  Mouth sores  HLD (hyperlipidemia)  Asthma  S/P percutaneous endoscopic gastrostomy (PEG) tube placement: for dysphagia  Humeral head fracture  H/O: hysterectomy  H/O cataract extraction, left  History of knee replacement      ---___---___---___---___---___---  FAMILY HISTORY:   FAMILY HISTORY:  Family history of dementia (Grandparent)  Family history of colon cancer (Grandparent)        ---___---___---___---___---___---  ALLERGIES:   Allergies    ASA; dye contrast (Anaphylaxis)  aspirin (Short breath)  divalproex sodium (Other (Unknown))  Flowers and Plants (Short breath)  Haldol (Other (Unknown))  penicillin (Short breath; Rash)  sulfa drugs (Short breath; Rash)  vancomycin (Rash; Urticaria; Hives)  Xanax (Other (Unknown))    Intolerances        ---___---___---___---___---___---  MEDICATIONS:  MEDICATIONS  (STANDING):  ascorbic acid 500 milliGRAM(s) Oral daily  buDESOnide  80 MICROgram(s)/formoterol 4.5 MICROgram(s) Inhaler 2 Puff(s) Inhalation two times a day  cefepime   IVPB 1000 milliGRAM(s) IV Intermittent every 8 hours  chlorhexidine 2% Cloths 1 Application(s) Topical daily  clonazePAM  Tablet 1.5 milliGRAM(s) Oral at bedtime  dextrose 5% + sodium chloride 0.9%. 1000 milliLiter(s) (50 mL/Hr) IV Continuous <Continuous>  dextrose 5% + sodium chloride 0.9%. 1000 milliLiter(s) (50 mL/Hr) IV Continuous <Continuous>  dextrose 5%. 1000 milliLiter(s) (50 mL/Hr) IV Continuous <Continuous>  dextrose 50% Injectable 12.5 Gram(s) IV Push once  dextrose 50% Injectable 25 Gram(s) IV Push once  dextrose 50% Injectable 25 Gram(s) IV Push once  diphenhydrAMINE   Injectable 25 milliGRAM(s) IV Push two times a day  ergocalciferol Drops 5000 Unit(s) Oral daily  famotidine  IVPB 20 milliGRAM(s) IV Intermittent daily  ferrous    sulfate Liquid 300 milliGRAM(s) Enteral Tube daily  gabapentin 300 milliGRAM(s) Oral two times a day  heparin  Injectable 5000 Unit(s) SubCutaneous every 12 hours  insulin lispro (HumaLOG) corrective regimen sliding scale   SubCutaneous every 6 hours  Medical Marijuana [place] Oil 2 milliLiter(s) 2 milliLiter(s) Enteral Tube <User Schedule>  Medical Marijuana Calm Oil 2 milliLiter(s) 2 milliLiter(s) Enteral Tube <User Schedule>  Medical Marijuana [place] Oil 2 milliLiter(s) 2 milliLiter(s) Enteral Tube <User Schedule>  midodrine 5 milliGRAM(s) Oral every 8 hours  multivitamin 1 Tablet(s) Oral daily  QUEtiapine 25 milliGRAM(s) Oral daily  tiotropium 18 MICROgram(s) Capsule 1 Capsule(s) Inhalation daily  tiZANidine 4 milliGRAM(s) Oral <User Schedule>  vancomycin  IVPB 500 milliGRAM(s) IV Intermittent every 12 hours  vancomycin  IVPB      zinc sulfate 220 milliGRAM(s) Oral daily    MEDICATIONS  (PRN):  acetaminophen  Suppository .. 650 milliGRAM(s) Rectal every 6 hours PRN Temp greater or equal to 38C (100.4F), Mild Pain (1 - 3)  ALBUTerol    90 MICROgram(s) HFA Inhaler 1 Puff(s) Inhalation every 4 hours PRN Shortness of Breath and/or Wheezing  ALBUTerol/ipratropium for Nebulization 3 milliLiter(s) Nebulizer every 6 hours PRN Shortness of Breath and/or Wheezing  carboxymethylcellulose 0.5% (Preservative-Free) Ophthalmic Solution 1 Drop(s) Both EYES three times a day PRN dry eyes  dextrose 40% Gel 15 Gram(s) Oral once PRN Blood Glucose LESS THAN 70 milliGRAM(s)/deciliter  glucagon  Injectable 1 milliGRAM(s) IntraMuscular once PRN Glucose LESS THAN 70 milligrams/deciliter  nystatin Powder 1 Application(s) Topical three times a day PRN under breasts      ---___---___---___---___---___---  REVIEW OF SYSTEM:    unable to obtain     ---___---___---___---___---___---  VITAL SIGNS:  69y , CAPILLARY BLOOD GLUCOSE      POCT Blood Glucose.: 117 mg/dL (2019 05:54)    T(C): 36.4 (19 @ 06:56), Max: 38.7 (19 @ 21:21)  HR: 106 (19 @ 06:56) (75 - 112)  BP: 107/66 (19 @ 06:56) (85/59 - 107/66)  RR: 18 (19 @ 06:56) (18 - 18)  SpO2: 97% (19 @ 06:56) (95% - 100%)  ---___---___---___---___---___---  PHYSICAL EXAM:    GEN: A&O X 0 , NAD , comfortable  HEENT: NCAT, PERRL, MMM, hearing intact  Neck: supple , no JVD  CVS: S1S2 , regular , No M/R/G appreciated  PULM: CTA B/L,  no W/R/R appreciated  ABD.: soft. non tender, non distended,  bowel sounds present peg noted   Extrem: intact pulses , no edema  contractures noted   Derm: No rash , no ecchymoses stage 4 sacral decubitus  PSYCH : normal mood,  no delusion not anxious     ---___---___---___---___---___---            LAB AND IMAGIN.1    7.6   )-----------( 323      ( 2019 10:48 )             20.8               06-30    132<L>  |  100  |  38<H>  ----------------------------<  103<H>  3.9   |  22  |  0.65    Ca    8.9      2019 07:08                                  [All pertinent / recent Imaging reviewed]         ---___---___---___---___---___---___ ---___---___---___---___---                         A S S E S S M E N T   A N D   P L A N :    fever   uti  continue abx  agitation klonopin and seroquel  asthma on budesonide  chronic pain on medical marijuana   anemia likely secondary to anemia of chronic disease   dm2  conitnue insulin in house   -GI/DVT Prophylaxis. on heparin     prognosis of full recovery  poor   --------------------------------------------  Case discussed with   Education given on   ___________________________  Thank you,  Deniz Bolden  [phone]

## 2019-07-01 NOTE — PROGRESS NOTE ADULT - SUBJECTIVE AND OBJECTIVE BOX
MYRIAM WHITE:[id number],   69yFemale followed for:  ASA; dye contrast (Anaphylaxis)  aspirin (Short breath)  divalproex sodium (Other (Unknown))  Flowers and Plants (Short breath)  Haldol (Other (Unknown))  penicillin (Short breath; Rash)  sulfa drugs (Short breath; Rash)  vancomycin (Rash; Urticaria; Hives)  Xanax (Other (Unknown))    PAST MEDICAL & SURGICAL HISTORY:  Type 2 diabetes mellitus  Dementia  DVT, lower extremity  CVA (cerebral vascular accident)  Fatty pancreas  PNA (pneumonia)  Pulmonary HTN  IGT (impaired glucose tolerance)  Ulcerative colitis  Acid reflux  Anxiety  Depression  Mouth sores  HLD (hyperlipidemia)  Asthma  S/P percutaneous endoscopic gastrostomy (PEG) tube placement: for dysphagia  Humeral head fracture  H/O: hysterectomy  H/O cataract extraction, left  History of knee replacement    FAMILY HISTORY:  Family history of dementia (Grandparent)  Family history of colon cancer (Grandparent)    MEDICATIONS  (STANDING):  ascorbic acid 500 milliGRAM(s) Oral daily  buDESOnide  80 MICROgram(s)/formoterol 4.5 MICROgram(s) Inhaler 2 Puff(s) Inhalation two times a day  cefepime   IVPB 1000 milliGRAM(s) IV Intermittent every 8 hours  chlorhexidine 2% Cloths 1 Application(s) Topical daily  clonazePAM  Tablet 1.5 milliGRAM(s) Oral at bedtime  dextrose 5% + sodium chloride 0.9%. 1000 milliLiter(s) (50 mL/Hr) IV Continuous <Continuous>  dextrose 5% + sodium chloride 0.9%. 1000 milliLiter(s) (50 mL/Hr) IV Continuous <Continuous>  dextrose 5%. 1000 milliLiter(s) (50 mL/Hr) IV Continuous <Continuous>  dextrose 50% Injectable 12.5 Gram(s) IV Push once  dextrose 50% Injectable 25 Gram(s) IV Push once  dextrose 50% Injectable 25 Gram(s) IV Push once  diphenhydrAMINE   Injectable 25 milliGRAM(s) IV Push two times a day  ergocalciferol Drops 5000 Unit(s) Oral daily  famotidine  IVPB 20 milliGRAM(s) IV Intermittent daily  ferrous    sulfate Liquid 300 milliGRAM(s) Enteral Tube daily  gabapentin 300 milliGRAM(s) Oral two times a day  heparin  Injectable 5000 Unit(s) SubCutaneous every 12 hours  insulin lispro (HumaLOG) corrective regimen sliding scale   SubCutaneous every 6 hours  Medical Marijuana Awake Oil 2 milliLiter(s) 2 milliLiter(s) Enteral Tube <User Schedule>  Medical Marijuana Calm Oil 2 milliLiter(s) 2 milliLiter(s) Enteral Tube <User Schedule>  Medical Marijuana [place] Oil 2 milliLiter(s) 2 milliLiter(s) Enteral Tube <User Schedule>  midodrine 5 milliGRAM(s) Oral every 8 hours  multivitamin 1 Tablet(s) Oral daily  QUEtiapine 25 milliGRAM(s) Oral daily  tiotropium 18 MICROgram(s) Capsule 1 Capsule(s) Inhalation daily  tiZANidine 4 milliGRAM(s) Oral <User Schedule>  vancomycin  IVPB 500 milliGRAM(s) IV Intermittent every 12 hours  vancomycin  IVPB      zinc sulfate 220 milliGRAM(s) Oral daily    MEDICATIONS  (PRN):  acetaminophen  Suppository .. 650 milliGRAM(s) Rectal every 6 hours PRN Temp greater or equal to 38C (100.4F), Mild Pain (1 - 3)  ALBUTerol    90 MICROgram(s) HFA Inhaler 1 Puff(s) Inhalation every 4 hours PRN Shortness of Breath and/or Wheezing  ALBUTerol/ipratropium for Nebulization 3 milliLiter(s) Nebulizer every 6 hours PRN Shortness of Breath and/or Wheezing  carboxymethylcellulose 0.5% (Preservative-Free) Ophthalmic Solution 1 Drop(s) Both EYES three times a day PRN dry eyes  dextrose 40% Gel 15 Gram(s) Oral once PRN Blood Glucose LESS THAN 70 milliGRAM(s)/deciliter  glucagon  Injectable 1 milliGRAM(s) IntraMuscular once PRN Glucose LESS THAN 70 milligrams/deciliter  nystatin Powder 1 Application(s) Topical three times a day PRN under breasts      Vital Signs Last 24 Hrs  T(C): 36.4 (01 Jul 2019 06:56), Max: 38.7 (30 Jun 2019 21:21)  T(F): 97.6 (01 Jul 2019 06:56), Max: 101.7 (30 Jun 2019 21:21)  HR: 106 (01 Jul 2019 06:56) (75 - 112)  BP: 107/66 (01 Jul 2019 06:56) (85/59 - 107/66)  BP(mean): --  RR: 18 (01 Jul 2019 06:56) (18 - 18)  SpO2: 97% (01 Jul 2019 06:56) (95% - 100%)  nc/at  s1s2  cta  soft, nt, nd no guarding or rebound  no c/c/e    CBC Full  -  ( 30 Jun 2019 10:48 )  WBC Count : 7.6 K/uL  RBC Count : 2.28 M/uL  Hemoglobin : 7.1 g/dL  Hematocrit : 20.8 %  Platelet Count - Automated : 323 K/uL  Mean Cell Volume : 91.3 fl  Mean Cell Hemoglobin : 31.2 pg  Mean Cell Hemoglobin Concentration : 34.2 gm/dL  Auto Neutrophil # : x  Auto Lymphocyte # : x  Auto Monocyte # : x  Auto Eosinophil # : x  Auto Basophil # : x  Auto Neutrophil % : x  Auto Lymphocyte % : x  Auto Monocyte % : x  Auto Eosinophil % : x  Auto Basophil % : x    06-30    132<L>  |  100  |  38<H>  ----------------------------<  103<H>  3.9   |  22  |  0.65    Ca    8.9      30 Jun 2019 07:08

## 2019-07-01 NOTE — CHART NOTE - NSCHARTNOTEFT_GEN_A_CORE
Nutrition follow up     Pt seen for malnutrition follow up     Hospital course as per chart:  Pt 68 y/o F with PMH: multiple hospitalizations, dementia (non-verbal), dysphagia with PEG, functional quadriplegia, Blackman cath, chronic pressure ulcers in sacral and heels, asthma on Prednisone, HLD, CVA, severe lordosis/kyphoscoliosis, chronic MRSA of right hip prothesis, left knee fracture, DM, anxiety, UTI, admitted with fever, leukocytosis, PEG dislodgement - S/P replacement in IR (06/25).    Source: Patient [ ]    Family [ ]     other [x]; Medical record;      Pt unable to speak with history of dementia as per documentation.  reports pt tolerating Glucerna 1.2 at goal of 240 ml/hr every 6 hours, denies pt with acute GI distress at this time, last BM yesterday (06/30). Pt with multiple pressure ulcers -  agreed for no carb Prosource 2xday, spoke to NP.  denies having questions/concerns about nutrition or tube feedings.    Diet: NPO with tube feedings     Enteral /Parenteral Nutrition: Glucerna 1.2 through bolus feedings via PEG at 240 ml/hr every 6 hours (4xday) provides 960 ml, 1152 kcal, 58 g protein, and 773 ml water (30 kcal/kg and 1.5 g/kg protein based on dosing weight 38.6 kg).     Current Weight: (06/24) 38.6 kg - ?accuracy due to fluid shifts, will continue to monitor.   % Weight Change: n/a    Pertinent Medications: MEDICATIONS  (STANDING):  ascorbic acid 500 milliGRAM(s) Oral daily  buDESOnide  80 MICROgram(s)/formoterol 4.5 MICROgram(s) Inhaler 2 Puff(s) Inhalation two times a day  cefepime   IVPB 1000 milliGRAM(s) IV Intermittent every 8 hours  chlorhexidine 2% Cloths 1 Application(s) Topical daily  clonazePAM  Tablet 1.5 milliGRAM(s) Oral at bedtime  dextrose 5% + sodium chloride 0.9%. 1000 milliLiter(s) (50 mL/Hr) IV Continuous <Continuous>  dextrose 5% + sodium chloride 0.9%. 1000 milliLiter(s) (50 mL/Hr) IV Continuous <Continuous>  dextrose 5%. 1000 milliLiter(s) (50 mL/Hr) IV Continuous <Continuous>  dextrose 50% Injectable 12.5 Gram(s) IV Push once  dextrose 50% Injectable 25 Gram(s) IV Push once  dextrose 50% Injectable 25 Gram(s) IV Push once  diphenhydrAMINE   Injectable 25 milliGRAM(s) IV Push two times a day  ergocalciferol Drops 5000 Unit(s) Oral daily  famotidine  IVPB 20 milliGRAM(s) IV Intermittent daily  ferrous    sulfate Liquid 300 milliGRAM(s) Enteral Tube daily  gabapentin 300 milliGRAM(s) Oral two times a day  heparin  Injectable 5000 Unit(s) SubCutaneous every 12 hours  insulin lispro (HumaLOG) corrective regimen sliding scale   SubCutaneous every 6 hours  Medical Marijuana Awake Oil 2 milliLiter(s) 2 milliLiter(s) Enteral Tube <User Schedule>  Medical Marijuana Calm Oil 2 milliLiter(s) 2 milliLiter(s) Enteral Tube <User Schedule>  Medical Marijuana [place] Oil 2 milliLiter(s) 2 milliLiter(s) Enteral Tube <User Schedule>  midodrine 5 milliGRAM(s) Oral every 8 hours  multivitamin 1 Tablet(s) Oral daily  QUEtiapine 25 milliGRAM(s) Oral daily  tiotropium 18 MICROgram(s) Capsule 1 Capsule(s) Inhalation daily  tiZANidine 4 milliGRAM(s) Oral <User Schedule>  vancomycin  IVPB 500 milliGRAM(s) IV Intermittent every 12 hours  vancomycin  IVPB      zinc sulfate 220 milliGRAM(s) Oral daily    MEDICATIONS  (PRN):  acetaminophen  Suppository .. 650 milliGRAM(s) Rectal every 6 hours PRN Temp greater or equal to 38C (100.4F), Mild Pain (1 - 3)  ALBUTerol    90 MICROgram(s) HFA Inhaler 1 Puff(s) Inhalation every 4 hours PRN Shortness of Breath and/or Wheezing  ALBUTerol/ipratropium for Nebulization 3 milliLiter(s) Nebulizer every 6 hours PRN Shortness of Breath and/or Wheezing  carboxymethylcellulose 0.5% (Preservative-Free) Ophthalmic Solution 1 Drop(s) Both EYES three times a day PRN dry eyes  dextrose 40% Gel 15 Gram(s) Oral once PRN Blood Glucose LESS THAN 70 milliGRAM(s)/deciliter  glucagon  Injectable 1 milliGRAM(s) IntraMuscular once PRN Glucose LESS THAN 70 milligrams/deciliter  nystatin Powder 1 Application(s) Topical three times a day PRN under breasts    Pertinent Labs: (07/01) Na133 mmol/L<L> Glu 103 mg/dL<H> BUN 38 mg/dL<H>   Finger sticks: (07/01) 117 (06/30) 83 - 202   (01/14) [id number] 5.0 %    Skin: multiple pressure ulcers in left heel, left inner thigh, and right buttocks deep tissue injury; sacrum stage 4; lower lumbar spine, left malleolus, and right medial lower leg unstageable; left inner malleolus stage 2; and left lower buttocks stage 3 as per documentation.  Noted +2 maryjane. foot and ankle edema as per flow sheets.     Estimated Needs:   [x] no change since previous assessment based on dosing weight 38.6 kg:   [ ] recalculated:     1158 - 1351 kcal/day (30 - 35 kcal/kg)  69 - 77 g protein/day (1.8-2.0 g/kg)  1158 - 1351 ml/day (30 - 35 ml/kcal)    Previous Nutrition Diagnosis: [x] Malnutrition; mild     Nutrition Diagnosis is [x] ongoing - being addressed with tube feedings at goal and nutritional supplementation.  Goal: Pt to meet >75% of estimated nutritional needs during hospital stay via tolerated route    New Nutrition Diagnosis: [x] Increased nutrient needs (protein, vitamin C, Zn, Multivitamin) related to increased demand for nutrients for pressure ulcer healing as evidenced by pt with pressure ulcers as above.     Interventions:     1. Recommend continue Glucerna 1.2 through bolus feedings via PEG at 240 ml/hr every 6 hours (4xday) provides 960 ml, 1152 kcal, 58 g protein, and 773 ml water (30 kcal/kg and 1.5 g/kg protein based on dosing weight 38.6 kg); add 45 ml of water before and after each feeding (total 1133 ml water/day).   2. Recommend no carb Prosource 2xday (120 kcal and 30 g protein) to optimize protein provision and help with pressure ulcer healing. Spoke to NP.   3. Continue Multivitamin, Vitamin C, and Zinc (total 10 days of Zn) to optimize nutrient intake and help with pressure ulcer healing.   4. Obtain current weight to identify changes if any.     Monitoring and Evaluation:     [ ] PO intake [ ] Tolerance to diet prescription [x] weights [x] follow up per protocol    [x] other: EN provision/tolerance     RD remains available.  Nicole Gutierrez MS, RDN, #[phone] Nutrition follow up     Pt seen for malnutrition follow up     Hospital course as per chart:  Pt 68 y/o F with PMH: multiple hospitalizations, dementia (non-verbal), dysphagia with PEG, functional quadriplegia, Blackman cath, chronic pressure ulcers in sacral and heels, asthma on Prednisone, HLD, CVA, severe lordosis/kyphoscoliosis, chronic MRSA of right hip prothesis, left knee fracture, DM, anxiety, UTI, admitted with fever, leukocytosis, PEG dislodgement - S/P replacement in IR (06/25).    Source: Patient [ ]    Family [ ]     other [x]; Medical record;      Pt unable to speak with history of dementia as per documentation.  reports pt tolerating Glucerna 1.2 at goal of 240 ml/hr every 6 hours, denies pt with acute GI distress at this time, last BM yesterday (06/30). Pt with multiple pressure ulcers -  agreed for no carb Prosource 2xday, spoke to NP.  denies having questions/concerns about nutrition or tube feedings.    Diet: NPO with tube feedings     Enteral /Parenteral Nutrition: Glucerna 1.2 through bolus feedings via PEG at 240 ml/hr every 6 hours (4xday) provides 960 ml, 1152 kcal, 58 g protein, and 773 ml water (30 kcal/kg and 1.5 g/kg protein based on dosing weight 38.6 kg).     Current Weight: (06/24) 38.6 kg - ?accuracy due to fluid shifts, will continue to monitor.   % Weight Change: n/a    Pertinent Medications: MEDICATIONS  (STANDING):  ascorbic acid 500 milliGRAM(s) Oral daily  buDESOnide  80 MICROgram(s)/formoterol 4.5 MICROgram(s) Inhaler 2 Puff(s) Inhalation two times a day  cefepime   IVPB 1000 milliGRAM(s) IV Intermittent every 8 hours  chlorhexidine 2% Cloths 1 Application(s) Topical daily  clonazePAM  Tablet 1.5 milliGRAM(s) Oral at bedtime  dextrose 5% + sodium chloride 0.9%. 1000 milliLiter(s) (50 mL/Hr) IV Continuous <Continuous>  dextrose 5% + sodium chloride 0.9%. 1000 milliLiter(s) (50 mL/Hr) IV Continuous <Continuous>  dextrose 5%. 1000 milliLiter(s) (50 mL/Hr) IV Continuous <Continuous>  dextrose 50% Injectable 12.5 Gram(s) IV Push once  dextrose 50% Injectable 25 Gram(s) IV Push once  dextrose 50% Injectable 25 Gram(s) IV Push once  diphenhydrAMINE   Injectable 25 milliGRAM(s) IV Push two times a day  ergocalciferol Drops 5000 Unit(s) Oral daily  famotidine  IVPB 20 milliGRAM(s) IV Intermittent daily  ferrous    sulfate Liquid 300 milliGRAM(s) Enteral Tube daily  gabapentin 300 milliGRAM(s) Oral two times a day  heparin  Injectable 5000 Unit(s) SubCutaneous every 12 hours  insulin lispro (HumaLOG) corrective regimen sliding scale   SubCutaneous every 6 hours  Medical Marijuana [place] Oil 2 milliLiter(s) 2 milliLiter(s) Enteral Tube <User Schedule>  Medical Marijuana Calm Oil 2 milliLiter(s) 2 milliLiter(s) Enteral Tube <User Schedule>  Medical Marijuana [place] Oil 2 milliLiter(s) 2 milliLiter(s) Enteral Tube <User Schedule>  midodrine 5 milliGRAM(s) Oral every 8 hours  multivitamin 1 Tablet(s) Oral daily  QUEtiapine 25 milliGRAM(s) Oral daily  tiotropium 18 MICROgram(s) Capsule 1 Capsule(s) Inhalation daily  tiZANidine 4 milliGRAM(s) Oral <User Schedule>  vancomycin  IVPB 500 milliGRAM(s) IV Intermittent every 12 hours  vancomycin  IVPB      zinc sulfate 220 milliGRAM(s) Oral daily    MEDICATIONS  (PRN):  acetaminophen  Suppository .. 650 milliGRAM(s) Rectal every 6 hours PRN Temp greater or equal to 38C (100.4F), Mild Pain (1 - 3)  ALBUTerol    90 MICROgram(s) HFA Inhaler 1 Puff(s) Inhalation every 4 hours PRN Shortness of Breath and/or Wheezing  ALBUTerol/ipratropium for Nebulization 3 milliLiter(s) Nebulizer every 6 hours PRN Shortness of Breath and/or Wheezing  carboxymethylcellulose 0.5% (Preservative-Free) Ophthalmic Solution 1 Drop(s) Both EYES three times a day PRN dry eyes  dextrose 40% Gel 15 Gram(s) Oral once PRN Blood Glucose LESS THAN 70 milliGRAM(s)/deciliter  glucagon  Injectable 1 milliGRAM(s) IntraMuscular once PRN Glucose LESS THAN 70 milligrams/deciliter  nystatin Powder 1 Application(s) Topical three times a day PRN under breasts    Pertinent Labs: (07/01) Na133 mmol/L<L> Glu 103 mg/dL<H> BUN 38 mg/dL<H>   Finger sticks: (07/01) 117 (06/30) 83 - 202   (01/14) [id number] 5.0 %    Skin: multiple pressure ulcers in left heel, left inner thigh, and right buttocks deep tissue injury; sacrum stage 4; lower lumbar spine, left malleolus, and right medial lower leg unstageable; left inner malleolus stage 2; and left lower buttocks stage 3 as per documentation.  Noted +2 maryjane. foot and ankle edema as per flow sheets.     Estimated Needs:   [x] no change since previous assessment based on dosing weight 38.6 kg:   [ ] recalculated:     1158 - 1351 kcal/day (30 - 35 kcal/kg)  69 - 77 g protein/day (1.8-2.0 g/kg)  1158 - 1351 ml/day (1 ml/kcal)    Previous Nutrition Diagnosis: [x] Malnutrition; mild     Nutrition Diagnosis is [x] ongoing - being addressed with tube feedings at goal and nutritional supplementation.  Goal: Pt to meet >75% of estimated nutritional needs during hospital stay via tolerated route    New Nutrition Diagnosis: [x] Increased nutrient needs (protein, vitamin C, Zn, Multivitamin) related to increased demand for nutrients for pressure ulcer healing as evidenced by pt with pressure ulcers as above.     Interventions:     1. Recommend continue Glucerna 1.2 through bolus feedings via PEG at 240 ml/hr every 6 hours (4xday) provides 960 ml, 1152 kcal, 58 g protein, and 773 ml water (30 kcal/kg and 1.5 g/kg protein based on dosing weight 38.6 kg); add 45 ml of water before and after each feeding (total 1133 ml water/day).   2. Recommend no carb Prosource 2xday (120 kcal and 30 g protein) to optimize protein provision and help with pressure ulcer healing. Spoke to NP.   3. Continue Multivitamin, Vitamin C, and Zinc (total 10 days of Zn) to optimize nutrient intake and help with pressure ulcer healing.   4. Obtain current weight to identify changes if any.     Monitoring and Evaluation:     [ ] PO intake [ ] Tolerance to diet prescription [x] weights [x] follow up per protocol    [x] other: EN provision/tolerance     RD remains available.  Nicole Gutierrez MS, RDN, #[phone]

## 2019-07-01 NOTE — PROGRESS NOTE ADULT - PROBLEM SELECTOR PLAN 1
-still present  -trend temps  -cont abx  -?source- no clear  -CT chest/abd/pelvis with contrast for source workup

## 2019-07-01 NOTE — PROGRESS NOTE ADULT - ASSESSMENT
69F  just discharged last week   multiple prolonged hospitalization rrelated  to severe dementia, MRSA infection with spacer in place PEG, decubitus ulcers, UTI, aspiration pna, readmitted with dislodged PEG tube. Has fever

## 2019-07-01 NOTE — CHART NOTE - NSCHARTNOTEFT_GEN_A_CORE
MEDICINE [facility]    CHIEF COMPLAINT   Patient is a 69y old  Female who presents with a chief complaint of PEG tube dislodgement, fever, leukocytosis (01 Jul 2019 11:59)      EVENT SUMMARY  Notified by RN @ for fever of     . Pt seen and examined at bedside. Denies any chills, chest pain, palpitations, SOB, abdominal pain, N/V/C/D, headache, urinary symptoms, cough.     REVIEW OF SYSTEMS:    CONSTITUTIONAL: No weakness, fevers or chills  EYES/ENT: No visual changes;  No vertigo or throat pain   NECK: No pain or stiffness  RESPIRATORY: No cough, wheezing, hemoptysis; No shortness of breath  CARDIOVASCULAR: No chest pain or palpitations  GASTROINTESTINAL: No abdominal or epigastric pain. No nausea, vomiting, or hematemesis; No diarrhea or constipation. No melena or hematochezia.  GENITOURINARY: No dysuria, frequency or hematuria  NEUROLOGICAL: No numbness or weakness  SKIN: No itching, rashes      Vital Signs Last 24 Hrs  T(C): 39 (01 Jul 2019 21:07), Max: 39 (01 Jul 2019 21:07)  T(F): 102.2 (01 Jul 2019 21:07), Max: 102.2 (01 Jul 2019 21:07)  HR: 112 (01 Jul 2019 21:07) (75 - 112)  BP: 126/72 (01 Jul 2019 21:07) (85/59 - 126/72)  BP(mean): --  RR: 18 (01 Jul 2019 21:07) (18 - 18)  SpO2: 100% (01 Jul 2019 21:07) (95% - 100%)    PHYSICAL EXAM:   General: NAD, non-toxic appearance  Neuro: NC, AT, PERRLA, no focal deficits  CV: S1 S2 RRR  Resp: B/L Lungs CTA, nonlabored  Abd: soft, NT, ND, + BS X4 quadrants  Ext: no edema, +PP b/l LE, warm to touch                           10.1   8.9   )-----------( 367      ( 01 Jul 2019 08:51 )             30.9     07-01    133<L>  |  99  |  38<H>  ----------------------------<  103<H>  4.2   |  23  |  0.65    Ca    9.3      01 Jul 2019 08:34      .Blood  06-28-19   No growth to date.  --  --      .Blood  06-26-19   No growth at 5 days.  --  --      .Blood  06-24-19   No growth at 5 days.  --  --      .Blood  06-18-19   No growth at 5 days.  --  --      .Blood  06-17-19   No growth at 5 days.  --  --      .Urine  06-13-19   >100,000 CFU/ml Klebsiella oxytoca  >100,000 CFU/ml Pseudomonas aeruginosa (Carbapenem Resistant)  --  Klebsiella oxytoca  Pseudomonas aeruginosa (Carbapenem Resistant)      .Blood  06-13-19   Growth in anaerobic bottle: Staphylococcus epidermidis  --  Staphylococcus epidermidis            RVP:     IMAGING  CXR:    Assessment & Plan   HPI:  69 year old female with multiple prolonged hospitalizations, PMH of Advanced dementia (nonverbal, dysphagia, with PEG tube, functional quadriplegic, chronic Blackman catheter) sacral state 4 pressure ulcer, heel pressure ulcers, asthma on chronic prednisone, HLD, CVA, severe lordosis/kyphoscoliosis, 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  thinks she must have pulled it out this morning. He says otherwise she was in her usual state of health at home. He says she has had some intermittent low grade fevers at home, which usually respond to Tylenol.   He says her Blackman was due to be changed early July 2019.   Last dose of Eliquis was 6/23/19 8pm.   In ED patient received 1LNS and cefepime. (24 Jun 2019 15:39)      Pt acutely presenting with fever of     - Continue current antimicrobials  - Continue IVF for hydration   - Continue antipyretic   - Cooling measures  - Blood cx x2 and urine cx  - CXR   - F/u with ID in AM   - Will continue to monitor  F/U with primary team in am    Joycelyn PINTO   Department of Medicine MEDICINE [facility]    CHIEF COMPLAINT   Patient is a 69y old  Female who presents with a chief complaint of PEG tube dislodgement, fever, leukocytosis (01 Jul 2019 11:59)      EVENT SUMMARY  Notified by RN for fever of 102.2. Pt seen and examined at bedside along with  and daughter. Pt is nonverbal; unable to assess ROS. Notified by radiology resident about pre lombardo results of CTc/a/p done this PM; worsening LLL opacity 2/2 mucus plugging and ? superimposed PNA. Pt,  and dtr made aware of the pre lombardo results; as per , pt with chronic LLL issues and has a pulmonologist who she follows outpatient Dr. Carlito Francis and requesting Dr. Mancini be consulted for his wife.      Vital Signs Last 24 Hrs  T(C): 39 (01 Jul 2019 21:07), Max: 39 (01 Jul 2019 21:07)  T(F): 102.2 (01 Jul 2019 21:07), Max: 102.2 (01 Jul 2019 21:07)  HR: 112 (01 Jul 2019 21:07) (75 - 112)  BP: 126/72 (01 Jul 2019 21:07) (85/59 - 126/72)  BP(mean): --  RR: 18 (01 Jul 2019 21:07) (18 - 18)  SpO2: 100% (01 Jul 2019 21:07) (95% - 100%)    PHYSICAL EXAM:   Constitutional: AOx0. NAD, non-toxic appearance, contracted  Neuro: PERRLA, grossly intact, no focal deficits  Respiratory: clear lungs bilaterally. No wheezing, rhonchi, or crackles. non-labored  Cardiovascular: S1 S2. No murmurs.  Gastrointestinal: soft, ND/NT, +BS in all quadrants, +PEG C/D/I  Extremities/Vascular: +PP b/l LE, +BLE edema, contracted upper and lower extremities, warm to touch                          10.1   8.9   )-----------( 367      ( 01 Jul 2019 08:51 )             30.9     07-01    133<L>  |  99  |  38<H>  ----------------------------<  103<H>  4.2   |  23  |  0.65    Ca    9.3      01 Jul 2019 08:34      .Blood  06-28-19   No growth to date.  --  --      .Blood  06-26-19   No growth at 5 days.  --  --      .Blood  06-24-19   No growth at 5 days.  --  --      .Blood  06-18-19   No growth at 5 days.  --  --      .Blood  06-17-19   No growth at 5 days.  --  --      .Urine  06-13-19   >100,000 CFU/ml Klebsiella oxytoca  >100,000 CFU/ml Pseudomonas aeruginosa (Carbapenem Resistant)  --  Klebsiella oxytoca  Pseudomonas aeruginosa (Carbapenem Resistant)      .Blood  06-13-19   Growth in anaerobic bottle: Staphylococcus epidermidis  --  Staphylococcus epidermidis    RVP: Rapid Respiratory Viral Panel (06.14.19 @ 07:39)    Rapid RVP Result: NotDete: This Respiratory Panel uses polymerase chain reaction (PCR) to detect for  adenovirus; coronavirus (HKU1, NL63, 229E, OC43); human metapneumovirus  (hMPV); human enterovirus/rhinovirus (Entero/RV); influenza A; influenza  A/H1; influenza A/H3; influenza A/H1-2009; influenza B; parainfluenza  viruses 1, 2, 3, 4; respiratory syncytial virus; Mycoplasma pneumoniae;  and Chlamydophila pneumoniae.      IMAGING  CXR:< from: Xray Chest 1 View- PORTABLE-Urgent (06.27.19 @ 21:43) >    Impression: Clear lungs.      < end of copied text >    < from: CT Chest/Abd/ Pelvis No Cont (07.01.19 @ 19:34) >    worsening left lower lobe opacity secondary to mucus   plug. debris within the left mainstem bronchus.   Superimposed pneumonia is not excluded.  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  thinks she must have pulled it out this morning. He says otherwise she was in her usual state of health at home. He says she has had some intermittent low grade fevers at home, which usually respond to Tylenol. s/p gavin change. Now with recurrent fevers     Recurrent fevers   - D/w ID attending on call Dr. Mcknight and Dr. Blas about the pre lombardo CT chest results; recommended to continue current antimicrobials. ID to f/u in AM   - Pt received rectal tylenol @ 6 pm; cooling measures ; will re evaluate temp in one hour; if elevated will give IV tylenol.   - Cooling measures  - F/u Blood cx x2 sent earlier    - F/u with final results of the CT c/a/p   - F/u with ID in AM   - Will continue to monitor  F/U with primary team in am    Joycelyn PINTO   Department of Medicine    ADDENDUM @ 11 PM   Temp 100.7. Pt remains NAD; Will continue to monitor.     Joycelyn OCHOA   #[id number]    ADDENDUM @ 7 AM  Pt afebrile overnight; temp this AM: 98. NAD VSS Will continue to monitor. Endorsed to the day team pt family requesting pulmonary consult with Dr. Luciana Mancini.    Joycelyn OCHOA  #[id number]

## 2019-07-02 ENCOUNTER — MOBILE ON CALL (OUTPATIENT)
Age: 69
End: 2019-07-02

## 2019-07-02 LAB
GLUCOSE BLDC GLUCOMTR-MCNC: 167 MG/DL — HIGH (ref 70–99)
GLUCOSE BLDC GLUCOMTR-MCNC: 171 MG/DL — HIGH (ref 70–99)
GLUCOSE BLDC GLUCOMTR-MCNC: 99 MG/DL — SIGNIFICANT CHANGE UP (ref 70–99)
VANCOMYCIN TROUGH SERPL-MCNC: 23.2 UG/ML — HIGH (ref 10–20)

## 2019-07-02 PROCEDURE — 99232 SBSQ HOSP IP/OBS MODERATE 35: CPT

## 2019-07-02 RX ORDER — METRONIDAZOLE 500 MG
500 TABLET ORAL EVERY 8 HOURS
Refills: 0 | Status: DISCONTINUED | OUTPATIENT
Start: 2019-07-02 | End: 2019-07-02

## 2019-07-02 RX ORDER — METRONIDAZOLE 500 MG
500 TABLET ORAL EVERY 12 HOURS
Refills: 0 | Status: DISCONTINUED | OUTPATIENT
Start: 2019-07-02 | End: 2019-07-09

## 2019-07-02 RX ORDER — CLONAZEPAM 1 MG
1.5 TABLET ORAL AT BEDTIME
Refills: 0 | Status: DISCONTINUED | OUTPATIENT
Start: 2019-07-02 | End: 2019-07-09

## 2019-07-02 RX ADMIN — MIDODRINE HYDROCHLORIDE 5 MILLIGRAM(S): 2.5 TABLET ORAL at 21:13

## 2019-07-02 RX ADMIN — Medication 650 MILLIGRAM(S): at 17:49

## 2019-07-02 RX ADMIN — CEFEPIME 100 MILLIGRAM(S): 1 INJECTION, POWDER, FOR SOLUTION INTRAMUSCULAR; INTRAVENOUS at 18:00

## 2019-07-02 RX ADMIN — HEPARIN SODIUM 5000 UNIT(S): 5000 INJECTION INTRAVENOUS; SUBCUTANEOUS at 06:28

## 2019-07-02 RX ADMIN — CHLORHEXIDINE GLUCONATE 1 APPLICATION(S): 213 SOLUTION TOPICAL at 11:18

## 2019-07-02 RX ADMIN — MIDODRINE HYDROCHLORIDE 5 MILLIGRAM(S): 2.5 TABLET ORAL at 13:32

## 2019-07-02 RX ADMIN — QUETIAPINE FUMARATE 25 MILLIGRAM(S): 200 TABLET, FILM COATED ORAL at 21:13

## 2019-07-02 RX ADMIN — Medication 25 MILLIGRAM(S): at 12:24

## 2019-07-02 RX ADMIN — CEFEPIME 100 MILLIGRAM(S): 1 INJECTION, POWDER, FOR SOLUTION INTRAMUSCULAR; INTRAVENOUS at 11:13

## 2019-07-02 RX ADMIN — GABAPENTIN 300 MILLIGRAM(S): 400 CAPSULE ORAL at 21:13

## 2019-07-02 RX ADMIN — Medication 100 MILLIGRAM(S): at 16:29

## 2019-07-02 RX ADMIN — ERGOCALCIFEROL 5000 UNIT(S): 1.25 CAPSULE ORAL at 21:12

## 2019-07-02 RX ADMIN — Medication 1: at 12:26

## 2019-07-02 RX ADMIN — Medication 100 MILLIGRAM(S): at 13:31

## 2019-07-02 RX ADMIN — Medication 1: at 06:29

## 2019-07-02 RX ADMIN — Medication 500 MILLIGRAM(S): at 11:18

## 2019-07-02 RX ADMIN — Medication 300 MILLIGRAM(S): at 11:18

## 2019-07-02 RX ADMIN — TIZANIDINE 4 MILLIGRAM(S): 4 TABLET ORAL at 09:09

## 2019-07-02 RX ADMIN — Medication 1 TABLET(S): at 11:18

## 2019-07-02 RX ADMIN — CEFEPIME 100 MILLIGRAM(S): 1 INJECTION, POWDER, FOR SOLUTION INTRAMUSCULAR; INTRAVENOUS at 00:28

## 2019-07-02 RX ADMIN — MIDODRINE HYDROCHLORIDE 5 MILLIGRAM(S): 2.5 TABLET ORAL at 06:28

## 2019-07-02 RX ADMIN — ZINC SULFATE TAB 220 MG (50 MG ZINC EQUIVALENT) 220 MILLIGRAM(S): 220 (50 ZN) TAB at 11:17

## 2019-07-02 RX ADMIN — Medication 650 MILLIGRAM(S): at 19:05

## 2019-07-02 RX ADMIN — TIZANIDINE 4 MILLIGRAM(S): 4 TABLET ORAL at 21:13

## 2019-07-02 RX ADMIN — Medication 1.5 MILLIGRAM(S): at 21:13

## 2019-07-02 RX ADMIN — FAMOTIDINE 100 MILLIGRAM(S): 10 INJECTION INTRAVENOUS at 12:26

## 2019-07-02 RX ADMIN — GABAPENTIN 300 MILLIGRAM(S): 400 CAPSULE ORAL at 09:09

## 2019-07-02 RX ADMIN — HEPARIN SODIUM 5000 UNIT(S): 5000 INJECTION INTRAVENOUS; SUBCUTANEOUS at 18:01

## 2019-07-02 NOTE — PROGRESS NOTE ADULT - PROBLEM SELECTOR PLAN 1
-still present  -trend temps  -cont abx  -prob from aspiration/pna  -add flagyl 500 mg iv q12 for anaerobic coverage  -overall prognosis poor

## 2019-07-02 NOTE — PROGRESS NOTE ADULT - SUBJECTIVE AND OBJECTIVE BOX
MYRIAM WHITE:[id number],   69yFemale followed for:  ASA; dye contrast (Anaphylaxis)  aspirin (Short breath)  divalproex sodium (Other (Unknown))  Flowers and Plants (Short breath)  Haldol (Other (Unknown))  penicillin (Short breath; Rash)  sulfa drugs (Short breath; Rash)  vancomycin (Rash; Urticaria; Hives)  Xanax (Other (Unknown))    PAST MEDICAL & SURGICAL HISTORY:  Type 2 diabetes mellitus  Dementia  DVT, lower extremity  CVA (cerebral vascular accident)  Fatty pancreas  PNA (pneumonia)  Pulmonary HTN  IGT (impaired glucose tolerance)  Ulcerative colitis  Acid reflux  Anxiety  Depression  Mouth sores  HLD (hyperlipidemia)  Asthma  S/P percutaneous endoscopic gastrostomy (PEG) tube placement: for dysphagia  Humeral head fracture  H/O: hysterectomy  H/O cataract extraction, left  History of knee replacement    FAMILY HISTORY:  Family history of dementia (Grandparent)  Family history of colon cancer (Grandparent)    MEDICATIONS  (STANDING):  ascorbic acid 500 milliGRAM(s) Oral daily  buDESOnide  80 MICROgram(s)/formoterol 4.5 MICROgram(s) Inhaler 2 Puff(s) Inhalation two times a day  cefepime   IVPB 1000 milliGRAM(s) IV Intermittent every 8 hours  chlorhexidine 2% Cloths 1 Application(s) Topical daily  clonazePAM  Tablet 1.5 milliGRAM(s) Oral at bedtime  dextrose 5% + sodium chloride 0.9%. 1000 milliLiter(s) (50 mL/Hr) IV Continuous <Continuous>  dextrose 5% + sodium chloride 0.9%. 1000 milliLiter(s) (50 mL/Hr) IV Continuous <Continuous>  dextrose 5%. 1000 milliLiter(s) (50 mL/Hr) IV Continuous <Continuous>  dextrose 50% Injectable 12.5 Gram(s) IV Push once  dextrose 50% Injectable 25 Gram(s) IV Push once  dextrose 50% Injectable 25 Gram(s) IV Push once  diphenhydrAMINE   Injectable 25 milliGRAM(s) IV Push two times a day  ergocalciferol Drops 5000 Unit(s) Oral daily  famotidine  IVPB 20 milliGRAM(s) IV Intermittent daily  ferrous    sulfate Liquid 300 milliGRAM(s) Enteral Tube daily  gabapentin 300 milliGRAM(s) Oral two times a day  heparin  Injectable 5000 Unit(s) SubCutaneous every 12 hours  insulin lispro (HumaLOG) corrective regimen sliding scale   SubCutaneous every 6 hours  Medical Marijuana Awake Oil 2 milliLiter(s) 2 milliLiter(s) Enteral Tube <User Schedule>  Medical Marijuana Calm Oil 2 milliLiter(s) 2 milliLiter(s) Enteral Tube <User Schedule>  Medical Marijuana [place] Oil 2 milliLiter(s) 2 milliLiter(s) Enteral Tube <User Schedule>  midodrine 5 milliGRAM(s) Oral every 8 hours  multivitamin 1 Tablet(s) Oral daily  QUEtiapine 25 milliGRAM(s) Oral daily  tiotropium 18 MICROgram(s) Capsule 1 Capsule(s) Inhalation daily  tiZANidine 4 milliGRAM(s) Oral <User Schedule>  vancomycin  IVPB 500 milliGRAM(s) IV Intermittent every 12 hours  vancomycin  IVPB      zinc sulfate 220 milliGRAM(s) Oral daily    MEDICATIONS  (PRN):  acetaminophen  Suppository .. 650 milliGRAM(s) Rectal every 6 hours PRN Temp greater or equal to 38C (100.4F), Mild Pain (1 - 3)  ALBUTerol    90 MICROgram(s) HFA Inhaler 1 Puff(s) Inhalation every 4 hours PRN Shortness of Breath and/or Wheezing  ALBUTerol/ipratropium for Nebulization 3 milliLiter(s) Nebulizer every 6 hours PRN Shortness of Breath and/or Wheezing  carboxymethylcellulose 0.5% (Preservative-Free) Ophthalmic Solution 1 Drop(s) Both EYES three times a day PRN dry eyes  dextrose 40% Gel 15 Gram(s) Oral once PRN Blood Glucose LESS THAN 70 milliGRAM(s)/deciliter  glucagon  Injectable 1 milliGRAM(s) IntraMuscular once PRN Glucose LESS THAN 70 milligrams/deciliter  nystatin Powder 1 Application(s) Topical three times a day PRN under breasts      Vital Signs Last 24 Hrs  T(C): 36.7 (02 Jul 2019 05:26), Max: 39 (01 Jul 2019 21:07)  T(F): 98 (02 Jul 2019 05:26), Max: 102.2 (01 Jul 2019 21:07)  HR: 99 (02 Jul 2019 05:26) (82 - 112)  BP: 112/63 (02 Jul 2019 05:26) (93/55 - 126/72)  BP(mean): --  RR: 18 (02 Jul 2019 05:26) (18 - 18)  SpO2: 100% (02 Jul 2019 05:26) (100% - 100%)  nc/at  s1s2  cta  soft, nt, nd no guarding or rebound  no c/c/e    CBC Full  -  ( 01 Jul 2019 08:51 )  WBC Count : 8.9 K/uL  RBC Count : 3.40 M/uL  Hemoglobin : 10.1 g/dL  Hematocrit : 30.9 %  Platelet Count - Automated : 367 K/uL  Mean Cell Volume : 90.8 fl  Mean Cell Hemoglobin : 29.7 pg  Mean Cell Hemoglobin Concentration : 32.7 gm/dL  Auto Neutrophil # : 6.6 K/uL  Auto Lymphocyte # : 0.8 K/uL  Auto Monocyte # : 1.0 K/uL  Auto Eosinophil # : 0.5 K/uL  Auto Basophil # : 0.0 K/uL  Auto Neutrophil % : 74.6 %  Auto Lymphocyte % : 8.8 %  Auto Monocyte % : 11.1 %  Auto Eosinophil % : 5.1 %  Auto Basophil % : 0.4 %    07-01    133<L>  |  99  |  38<H>  ----------------------------<  103<H>  4.2   |  23  |  0.65    Ca    9.3      01 Jul 2019 08:34

## 2019-07-02 NOTE — PROGRESS NOTE ADULT - SUBJECTIVE AND OBJECTIVE BOX
MYRIAM HEATH 69y MRN-[id number]    Patient is a 69y old  Female who presents with a chief complaint of PEG tube dislodgement, fever, leukocytosis (02 Jul 2019 07:15)      Follow Up/CC:  ID following for fever    Interval History/ROS: fever last night    Allergies    ASA; dye contrast (Anaphylaxis)  aspirin (Short breath)  divalproex sodium (Other (Unknown))  Flowers and Plants (Short breath)  Haldol (Other (Unknown))  penicillin (Short breath; Rash)  sulfa drugs (Short breath; Rash)  vancomycin (Rash; Urticaria; Hives)  Xanax (Other (Unknown))    Intolerances        ANTIMICROBIALS:  cefepime   IVPB 1000 every 8 hours  metroNIDAZOLE  IVPB 500 every 8 hours  vancomycin  IVPB 500 every 12 hours  vancomycin  IVPB        MEDICATIONS  (STANDING):  ascorbic acid 500 milliGRAM(s) Oral daily  buDESOnide  80 MICROgram(s)/formoterol 4.5 MICROgram(s) Inhaler 2 Puff(s) Inhalation two times a day  cefepime   IVPB 1000 milliGRAM(s) IV Intermittent every 8 hours  chlorhexidine 2% Cloths 1 Application(s) Topical daily  clonazePAM  Tablet 1.5 milliGRAM(s) Oral at bedtime  dextrose 5% + sodium chloride 0.9%. 1000 milliLiter(s) (50 mL/Hr) IV Continuous <Continuous>  dextrose 5% + sodium chloride 0.9%. 1000 milliLiter(s) (50 mL/Hr) IV Continuous <Continuous>  dextrose 5%. 1000 milliLiter(s) (50 mL/Hr) IV Continuous <Continuous>  dextrose 50% Injectable 12.5 Gram(s) IV Push once  dextrose 50% Injectable 25 Gram(s) IV Push once  dextrose 50% Injectable 25 Gram(s) IV Push once  diphenhydrAMINE   Injectable 25 milliGRAM(s) IV Push two times a day  ergocalciferol Drops 5000 Unit(s) Oral daily  famotidine  IVPB 20 milliGRAM(s) IV Intermittent daily  ferrous    sulfate Liquid 300 milliGRAM(s) Enteral Tube daily  gabapentin 300 milliGRAM(s) Oral two times a day  heparin  Injectable 5000 Unit(s) SubCutaneous every 12 hours  insulin lispro (HumaLOG) corrective regimen sliding scale   SubCutaneous every 6 hours  Medical Marijuana Awake Oil 2 milliLiter(s) 2 milliLiter(s) Enteral Tube <User Schedule>  Medical Marijuana Calm Oil 2 milliLiter(s) 2 milliLiter(s) Enteral Tube <User Schedule>  Medical Marijuana [place] Oil 2 milliLiter(s) 2 milliLiter(s) Enteral Tube <User Schedule>  metroNIDAZOLE  IVPB 500 milliGRAM(s) IV Intermittent every 8 hours  midodrine 5 milliGRAM(s) Oral every 8 hours  multivitamin 1 Tablet(s) Oral daily  QUEtiapine 25 milliGRAM(s) Oral daily  tiotropium 18 MICROgram(s) Capsule 1 Capsule(s) Inhalation daily  tiZANidine 4 milliGRAM(s) Oral <User Schedule>  vancomycin  IVPB 500 milliGRAM(s) IV Intermittent every 12 hours  vancomycin  IVPB      zinc sulfate 220 milliGRAM(s) Oral daily    MEDICATIONS  (PRN):  acetaminophen  Suppository .. 650 milliGRAM(s) Rectal every 6 hours PRN Temp greater or equal to 38C (100.4F), Mild Pain (1 - 3)  ALBUTerol    90 MICROgram(s) HFA Inhaler 1 Puff(s) Inhalation every 4 hours PRN Shortness of Breath and/or Wheezing  ALBUTerol/ipratropium for Nebulization 3 milliLiter(s) Nebulizer every 6 hours PRN Shortness of Breath and/or Wheezing  carboxymethylcellulose 0.5% (Preservative-Free) Ophthalmic Solution 1 Drop(s) Both EYES three times a day PRN dry eyes  dextrose 40% Gel 15 Gram(s) Oral once PRN Blood Glucose LESS THAN 70 milliGRAM(s)/deciliter  glucagon  Injectable 1 milliGRAM(s) IntraMuscular once PRN Glucose LESS THAN 70 milligrams/deciliter  nystatin Powder 1 Application(s) Topical three times a day PRN under breasts        Vital Signs Last 24 Hrs  T(C): 37.6 (02 Jul 2019 09:42), Max: 39 (01 Jul 2019 21:07)  T(F): 99.7 (02 Jul 2019 09:42), Max: 102.2 (01 Jul 2019 21:07)  HR: 99 (02 Jul 2019 05:26) (93 - 112)  BP: 112/63 (02 Jul 2019 05:26) (93/55 - 126/72)  BP(mean): --  RR: 18 (02 Jul 2019 05:26) (18 - 18)  SpO2: 100% (02 Jul 2019 05:26) (100% - 100%)    CBC Full  -  ( 01 Jul 2019 08:51 )  WBC Count : 8.9 K/uL  RBC Count : 3.40 M/uL  Hemoglobin : 10.1 g/dL  Hematocrit : 30.9 %  Platelet Count - Automated : 367 K/uL  Mean Cell Volume : 90.8 fl  Mean Cell Hemoglobin : 29.7 pg  Mean Cell Hemoglobin Concentration : 32.7 gm/dL  Auto Neutrophil # : 6.6 K/uL  Auto Lymphocyte # : 0.8 K/uL  Auto Monocyte # : 1.0 K/uL  Auto Eosinophil # : 0.5 K/uL  Auto Basophil # : 0.0 K/uL  Auto Neutrophil % : 74.6 %  Auto Lymphocyte % : 8.8 %  Auto Monocyte % : 11.1 %  Auto Eosinophil % : 5.1 %  Auto Basophil % : 0.4 %    07-01    133<L>  |  99  |  38<H>  ----------------------------<  103<H>  4.2   |  23  |  0.65    Ca    9.3      01 Jul 2019 08:34            MICROBIOLOGY:  .Blood  06-28-19   No growth to date.  --  --      .Blood  06-26-19   No growth at 5 days.  --  --      .Blood  06-24-19   No growth at 5 days.  --  --      .Blood  06-18-19   No growth at 5 days.  --  --      .Blood  06-17-19   No growth at 5 days.  --  --      .Urine  06-13-19   >100,000 CFU/ml Klebsiella oxytoca  >100,000 CFU/ml Pseudomonas aeruginosa (Carbapenem Resistant)  --  Klebsiella oxytoca  Pseudomonas aeruginosa (Carbapenem Resistant)      .Blood  06-13-19   Growth in anaerobic bottle: Staphylococcus epidermidis  --  Staphylococcus epidermidis        RADIOLOGY    < from: CT chest/Abdomen and Pelvis w/ Oral Cont (07.01.19 @ 19:34) >  Impacted left sided airways with collapse of the lingula and left lower   lobe. Underlying pneumonia is not excluded.    Large sacral decubitus ulceration with concern for sacral osteomyelitis.    < end of copied text >

## 2019-07-02 NOTE — PROGRESS NOTE ADULT - SUBJECTIVE AND OBJECTIVE BOX
---___---___---___---___---___---___ ---___---___---___---___---___---___---___---___---                  M E D I C A L   A T T E N D I N G   P R O G R E S S   N O T E  ---___---___---___---___---___---___ ---___---___---___---___---___---___---___---___---        ================================================    ++CHIEF COMPLAINT:   Patient is a 69y old  Female who presents with a chief complaint of PEG tube dislodgement, fever, leukocytosis (02 Jul 2019 10:55)      Gastrostomy malfunction    ---___---___---___---___---___---  PAST MEDICAL / Surgical  HISTORY:  PAST MEDICAL & SURGICAL HISTORY:  Type 2 diabetes mellitus  Dementia  DVT, lower extremity  CVA (cerebral vascular accident)  Fatty pancreas  PNA (pneumonia)  Pulmonary HTN  IGT (impaired glucose tolerance)  Ulcerative colitis  Acid reflux  Anxiety  Depression  Mouth sores  HLD (hyperlipidemia)  Asthma  S/P percutaneous endoscopic gastrostomy (PEG) tube placement: for dysphagia  Humeral head fracture  H/O: hysterectomy  H/O cataract extraction, left  History of knee replacement      ---___---___---___---___---___---  FAMILY HISTORY:   FAMILY HISTORY:  Family history of dementia (Grandparent)  Family history of colon cancer (Grandparent)        ---___---___---___---___---___---  ALLERGIES:   Allergies    ASA; dye contrast (Anaphylaxis)  aspirin (Short breath)  divalproex sodium (Other (Unknown))  Flowers and Plants (Short breath)  Haldol (Other (Unknown))  penicillin (Short breath; Rash)  sulfa drugs (Short breath; Rash)  vancomycin (Rash; Urticaria; Hives)  Xanax (Other (Unknown))    Intolerances        ---___---___---___---___---___---  MEDICATIONS:  MEDICATIONS  (STANDING):  ascorbic acid 500 milliGRAM(s) Oral daily  buDESOnide  80 MICROgram(s)/formoterol 4.5 MICROgram(s) Inhaler 2 Puff(s) Inhalation two times a day  cefepime   IVPB 1000 milliGRAM(s) IV Intermittent every 8 hours  chlorhexidine 2% Cloths 1 Application(s) Topical daily  clonazePAM  Tablet 1.5 milliGRAM(s) Oral at bedtime  dextrose 5% + sodium chloride 0.9%. 1000 milliLiter(s) (50 mL/Hr) IV Continuous <Continuous>  dextrose 5% + sodium chloride 0.9%. 1000 milliLiter(s) (50 mL/Hr) IV Continuous <Continuous>  dextrose 5%. 1000 milliLiter(s) (50 mL/Hr) IV Continuous <Continuous>  dextrose 50% Injectable 12.5 Gram(s) IV Push once  dextrose 50% Injectable 25 Gram(s) IV Push once  dextrose 50% Injectable 25 Gram(s) IV Push once  diphenhydrAMINE   Injectable 25 milliGRAM(s) IV Push two times a day  ergocalciferol Drops 5000 Unit(s) Oral daily  famotidine  IVPB 20 milliGRAM(s) IV Intermittent daily  ferrous    sulfate Liquid 300 milliGRAM(s) Enteral Tube daily  gabapentin 300 milliGRAM(s) Oral two times a day  heparin  Injectable 5000 Unit(s) SubCutaneous every 12 hours  insulin lispro (HumaLOG) corrective regimen sliding scale   SubCutaneous every 6 hours  Medical Marijuana Awake Oil 2 milliLiter(s) 2 milliLiter(s) Enteral Tube <User Schedule>  Medical Marijuana Calm Oil 2 milliLiter(s) 2 milliLiter(s) Enteral Tube <User Schedule>  Medical Marijuana [place] Oil 2 milliLiter(s) 2 milliLiter(s) Enteral Tube <User Schedule>  metroNIDAZOLE  IVPB 500 milliGRAM(s) IV Intermittent every 12 hours  midodrine 5 milliGRAM(s) Oral every 8 hours  multivitamin 1 Tablet(s) Oral daily  QUEtiapine 25 milliGRAM(s) Oral daily  tiotropium 18 MICROgram(s) Capsule 1 Capsule(s) Inhalation daily  tiZANidine 4 milliGRAM(s) Oral <User Schedule>  vancomycin  IVPB 500 milliGRAM(s) IV Intermittent every 12 hours  vancomycin  IVPB      zinc sulfate 220 milliGRAM(s) Oral daily    MEDICATIONS  (PRN):  acetaminophen  Suppository .. 650 milliGRAM(s) Rectal every 6 hours PRN Temp greater or equal to 38C (100.4F), Mild Pain (1 - 3)  ALBUTerol    90 MICROgram(s) HFA Inhaler 1 Puff(s) Inhalation every 4 hours PRN Shortness of Breath and/or Wheezing  ALBUTerol/ipratropium for Nebulization 3 milliLiter(s) Nebulizer every 6 hours PRN Shortness of Breath and/or Wheezing  carboxymethylcellulose 0.5% (Preservative-Free) Ophthalmic Solution 1 Drop(s) Both EYES three times a day PRN dry eyes  dextrose 40% Gel 15 Gram(s) Oral once PRN Blood Glucose LESS THAN 70 milliGRAM(s)/deciliter  glucagon  Injectable 1 milliGRAM(s) IntraMuscular once PRN Glucose LESS THAN 70 milligrams/deciliter  nystatin Powder 1 Application(s) Topical three times a day PRN under breasts      ---___---___---___---___---___---  REVIEW OF SYSTEM:    unable to obtain   ---___---___---___---___---___---  VITAL SIGNS:  69y , CAPILLARY BLOOD GLUCOSE      POCT Blood Glucose.: 171 mg/dL (02 Jul 2019 12:04)    T(C): 36.8 (07-02-19 @ 14:55), Max: 39 (07-01-19 @ 21:07)  HR: 102 (07-02-19 @ 14:55) (93 - 112)  BP: 138/78 (07-02-19 @ 14:55) (93/55 - 138/78)  RR: 20 (07-02-19 @ 14:55) (18 - 20)  SpO2: 100% (07-02-19 @ 14:55) (100% - 100%)  ---___---___---___---___---___---  PHYSICAL EXAM:    GEN: A&O X 0 , NAD , comfortable  HEENT: NCAT, PERRL, MMM, hearing intact  Neck: supple , no JVD  CVS: S1S2 , regular , No M/R/G appreciated  PULM: CTA B/L,  no W/R/R appreciated  ABD.: soft. non tender, non distended,  bowel sounds present  Extrem: intact pulses , no edema  contracted severely   Derm: No rash , no ecchymoses stage 4 sacral decubitus         ---___---___---___---___---___---            LAB AND IMAGING:                          10.1   8.9   )-----------( 367      ( 01 Jul 2019 08:51 )             30.9               07-01    133<L>  |  99  |  38<H>  ----------------------------<  103<H>  4.2   |  23  |  0.65    Ca    9.3      01 Jul 2019 08:34                                  [All pertinent / recent Imaging reviewed]         ---___---___---___---___---___---___ ---___---___---___---___---                         A S S E S S M E N T   A N D   P L A N :      HEALTH ISSUES - PROBLEM Dx:  fever  still febril  id following   sepsis  continue abx sepsis likely form pneumonia   agitation  continue klonopin and seroquel   dementia supportive care   GI/DVT Prophylaxis. on heparin sq       --------------------------------------------  Case discussed with   Education given on   ___________________________  Thank you,  Deniz Bolden  [phone]

## 2019-07-03 LAB
CULTURE RESULTS: SIGNIFICANT CHANGE UP
CULTURE RESULTS: SIGNIFICANT CHANGE UP
GLUCOSE BLDC GLUCOMTR-MCNC: 149 MG/DL — HIGH (ref 70–99)
GLUCOSE BLDC GLUCOMTR-MCNC: 165 MG/DL — HIGH (ref 70–99)
GLUCOSE BLDC GLUCOMTR-MCNC: 176 MG/DL — HIGH (ref 70–99)
GLUCOSE BLDC GLUCOMTR-MCNC: 181 MG/DL — HIGH (ref 70–99)
GLUCOSE BLDC GLUCOMTR-MCNC: 188 MG/DL — HIGH (ref 70–99)
HCT VFR BLD CALC: 26.1 % — LOW (ref 34.5–45)
HGB BLD-MCNC: 8.2 G/DL — LOW (ref 11.5–15.5)
MCHC RBC-ENTMCNC: 29 PG — SIGNIFICANT CHANGE UP (ref 27–34)
MCHC RBC-ENTMCNC: 31.4 GM/DL — LOW (ref 32–36)
MCV RBC AUTO: 92.2 FL — SIGNIFICANT CHANGE UP (ref 80–100)
PLATELET # BLD AUTO: 417 K/UL — HIGH (ref 150–400)
RBC # BLD: 2.83 M/UL — LOW (ref 3.8–5.2)
RBC # FLD: 15 % — HIGH (ref 10.3–14.5)
SPECIMEN SOURCE: SIGNIFICANT CHANGE UP
SPECIMEN SOURCE: SIGNIFICANT CHANGE UP
VANCOMYCIN TROUGH SERPL-MCNC: 23.4 UG/ML — HIGH (ref 10–20)
WBC # BLD: 13.03 K/UL — HIGH (ref 3.8–10.5)
WBC # FLD AUTO: 13.03 K/UL — HIGH (ref 3.8–10.5)

## 2019-07-03 PROCEDURE — 99232 SBSQ HOSP IP/OBS MODERATE 35: CPT

## 2019-07-03 PROCEDURE — 99223 1ST HOSP IP/OBS HIGH 75: CPT

## 2019-07-03 RX ORDER — VANCOMYCIN HCL 1 G
750 VIAL (EA) INTRAVENOUS DAILY
Refills: 0 | Status: DISCONTINUED | OUTPATIENT
Start: 2019-07-04 | End: 2019-07-08

## 2019-07-03 RX ORDER — MULTIVIT-MIN/FERROUS GLUCONATE 9 MG/15 ML
15 LIQUID (ML) ORAL DAILY
Refills: 0 | Status: DISCONTINUED | OUTPATIENT
Start: 2019-07-03 | End: 2019-08-07

## 2019-07-03 RX ORDER — ACETAMINOPHEN 500 MG
1000 TABLET ORAL ONCE
Refills: 0 | Status: DISCONTINUED | OUTPATIENT
Start: 2019-07-03 | End: 2019-07-03

## 2019-07-03 RX ORDER — ACETAMINOPHEN 500 MG
500 TABLET ORAL ONCE
Refills: 0 | Status: COMPLETED | OUTPATIENT
Start: 2019-07-03 | End: 2019-07-03

## 2019-07-03 RX ORDER — IPRATROPIUM/ALBUTEROL SULFATE 18-103MCG
3 AEROSOL WITH ADAPTER (GRAM) INHALATION
Refills: 0 | Status: COMPLETED | OUTPATIENT
Start: 2019-07-03 | End: 2019-07-10

## 2019-07-03 RX ADMIN — MIDODRINE HYDROCHLORIDE 5 MILLIGRAM(S): 2.5 TABLET ORAL at 21:55

## 2019-07-03 RX ADMIN — TIZANIDINE 4 MILLIGRAM(S): 4 TABLET ORAL at 08:22

## 2019-07-03 RX ADMIN — ERGOCALCIFEROL 5000 UNIT(S): 1.25 CAPSULE ORAL at 16:21

## 2019-07-03 RX ADMIN — CEFEPIME 100 MILLIGRAM(S): 1 INJECTION, POWDER, FOR SOLUTION INTRAMUSCULAR; INTRAVENOUS at 18:26

## 2019-07-03 RX ADMIN — ZINC SULFATE TAB 220 MG (50 MG ZINC EQUIVALENT) 220 MILLIGRAM(S): 220 (50 ZN) TAB at 12:19

## 2019-07-03 RX ADMIN — Medication 500 MILLIGRAM(S): at 23:00

## 2019-07-03 RX ADMIN — TIZANIDINE 4 MILLIGRAM(S): 4 TABLET ORAL at 20:34

## 2019-07-03 RX ADMIN — HEPARIN SODIUM 5000 UNIT(S): 5000 INJECTION INTRAVENOUS; SUBCUTANEOUS at 18:29

## 2019-07-03 RX ADMIN — Medication 1 TABLET(S): at 12:20

## 2019-07-03 RX ADMIN — HEPARIN SODIUM 5000 UNIT(S): 5000 INJECTION INTRAVENOUS; SUBCUTANEOUS at 05:01

## 2019-07-03 RX ADMIN — Medication 650 MILLIGRAM(S): at 07:00

## 2019-07-03 RX ADMIN — CEFEPIME 100 MILLIGRAM(S): 1 INJECTION, POWDER, FOR SOLUTION INTRAMUSCULAR; INTRAVENOUS at 11:13

## 2019-07-03 RX ADMIN — MIDODRINE HYDROCHLORIDE 5 MILLIGRAM(S): 2.5 TABLET ORAL at 12:27

## 2019-07-03 RX ADMIN — QUETIAPINE FUMARATE 25 MILLIGRAM(S): 200 TABLET, FILM COATED ORAL at 21:55

## 2019-07-03 RX ADMIN — MIDODRINE HYDROCHLORIDE 5 MILLIGRAM(S): 2.5 TABLET ORAL at 05:01

## 2019-07-03 RX ADMIN — Medication 200 MILLIGRAM(S): at 20:31

## 2019-07-03 RX ADMIN — Medication 100 MILLIGRAM(S): at 16:20

## 2019-07-03 RX ADMIN — Medication 500 MILLIGRAM(S): at 12:20

## 2019-07-03 RX ADMIN — GABAPENTIN 300 MILLIGRAM(S): 400 CAPSULE ORAL at 20:33

## 2019-07-03 RX ADMIN — Medication 1: at 00:12

## 2019-07-03 RX ADMIN — Medication 100 MILLIGRAM(S): at 04:01

## 2019-07-03 RX ADMIN — Medication 1: at 06:25

## 2019-07-03 RX ADMIN — FAMOTIDINE 100 MILLIGRAM(S): 10 INJECTION INTRAVENOUS at 12:22

## 2019-07-03 RX ADMIN — Medication 3 MILLILITER(S): at 08:40

## 2019-07-03 RX ADMIN — GABAPENTIN 300 MILLIGRAM(S): 400 CAPSULE ORAL at 08:22

## 2019-07-03 RX ADMIN — Medication 1.5 MILLIGRAM(S): at 21:54

## 2019-07-03 RX ADMIN — CEFEPIME 100 MILLIGRAM(S): 1 INJECTION, POWDER, FOR SOLUTION INTRAMUSCULAR; INTRAVENOUS at 02:05

## 2019-07-03 RX ADMIN — Medication 300 MILLIGRAM(S): at 12:19

## 2019-07-03 RX ADMIN — Medication 1: at 12:24

## 2019-07-03 RX ADMIN — Medication 3 MILLILITER(S): at 16:38

## 2019-07-03 RX ADMIN — Medication 650 MILLIGRAM(S): at 04:52

## 2019-07-03 RX ADMIN — CHLORHEXIDINE GLUCONATE 1 APPLICATION(S): 213 SOLUTION TOPICAL at 12:25

## 2019-07-03 NOTE — PROGRESS NOTE ADULT - SUBJECTIVE AND OBJECTIVE BOX
MYRIAM HEATH 69y [id number]    Patient is a 69y old  Female who presents with a chief complaint of PEG tube dislodgement, fever, leukocytosis (03 Jul 2019 16:04)      Follow Up/CC:  ID following for fever    Interval History/ROS: fever+    Allergies    ASA; dye contrast (Anaphylaxis)  aspirin (Short breath)  divalproex sodium (Other (Unknown))  Flowers and Plants (Short breath)  Haldol (Other (Unknown))  penicillin (Short breath; Rash)  sulfa drugs (Short breath; Rash)  vancomycin (Rash; Urticaria; Hives)  Xanax (Other (Unknown))    Intolerances        ANTIMICROBIALS:  cefepime   IVPB 1000 every 8 hours  metroNIDAZOLE  IVPB 500 every 12 hours      MEDICATIONS  (STANDING):  ascorbic acid 500 milliGRAM(s) Oral daily  buDESOnide  80 MICROgram(s)/formoterol 4.5 MICROgram(s) Inhaler 2 Puff(s) Inhalation two times a day  cefepime   IVPB 1000 milliGRAM(s) IV Intermittent every 8 hours  chlorhexidine 2% Cloths 1 Application(s) Topical daily  clonazePAM  Tablet 1.5 milliGRAM(s) Oral at bedtime  dextrose 5% + sodium chloride 0.9%. 1000 milliLiter(s) (50 mL/Hr) IV Continuous <Continuous>  dextrose 5%. 1000 milliLiter(s) (50 mL/Hr) IV Continuous <Continuous>  dextrose 50% Injectable 12.5 Gram(s) IV Push once  dextrose 50% Injectable 25 Gram(s) IV Push once  dextrose 50% Injectable 25 Gram(s) IV Push once  diphenhydrAMINE   Injectable 25 milliGRAM(s) IV Push two times a day  ergocalciferol Drops 5000 Unit(s) Oral daily  famotidine  IVPB 20 milliGRAM(s) IV Intermittent daily  ferrous    sulfate Liquid 300 milliGRAM(s) Enteral Tube daily  gabapentin 300 milliGRAM(s) Oral two times a day  heparin  Injectable 5000 Unit(s) SubCutaneous every 12 hours  insulin lispro (HumaLOG) corrective regimen sliding scale   SubCutaneous every 6 hours  Medical Marijuana Awake Oil 2 milliLiter(s) 2 milliLiter(s) Enteral Tube <User Schedule>  Medical Marijuana Calm Oil 2 milliLiter(s) 2 milliLiter(s) Enteral Tube <User Schedule>  Medical Marijuana [place] Oil 2 milliLiter(s) 2 milliLiter(s) Enteral Tube <User Schedule>  metroNIDAZOLE  IVPB 500 milliGRAM(s) IV Intermittent every 12 hours  midodrine 5 milliGRAM(s) Oral every 8 hours  multivitamin  Chewable 1 Tablet(s) Oral daily  QUEtiapine 25 milliGRAM(s) Oral daily  tiotropium 18 MICROgram(s) Capsule 1 Capsule(s) Inhalation daily  tiZANidine 4 milliGRAM(s) Oral <User Schedule>  zinc sulfate 220 milliGRAM(s) Oral daily    MEDICATIONS  (PRN):  acetaminophen  Suppository .. 650 milliGRAM(s) Rectal every 6 hours PRN Temp greater or equal to 38C (100.4F), Mild Pain (1 - 3)  ALBUTerol    90 MICROgram(s) HFA Inhaler 1 Puff(s) Inhalation every 4 hours PRN Shortness of Breath and/or Wheezing  ALBUTerol/ipratropium for Nebulization 3 milliLiter(s) Nebulizer every 6 hours PRN Shortness of Breath and/or Wheezing  carboxymethylcellulose 0.5% (Preservative-Free) Ophthalmic Solution 1 Drop(s) Both EYES three times a day PRN dry eyes  dextrose 40% Gel 15 Gram(s) Oral once PRN Blood Glucose LESS THAN 70 milliGRAM(s)/deciliter  glucagon  Injectable 1 milliGRAM(s) IntraMuscular once PRN Glucose LESS THAN 70 milligrams/deciliter  nystatin Powder 1 Application(s) Topical three times a day PRN under breasts        Vital Signs Last 24 Hrs  T(C): 36.5 (03 Jul 2019 14:31), Max: 38.7 (02 Jul 2019 17:40)  T(F): 97.7 (03 Jul 2019 14:31), Max: 101.7 (02 Jul 2019 17:40)  HR: 91 (03 Jul 2019 14:31) (91 - 123)  BP: 142/67 (03 Jul 2019 14:31) (98/56 - 142/67)  BP(mean): --  RR: 20 (03 Jul 2019 14:31) (18 - 20)  SpO2: 97% (03 Jul 2019 14:31) (97% - 100%)    CBC Full  -  ( 03 Jul 2019 11:56 )  WBC Count : 13.03 K/uL  RBC Count : 2.83 M/uL  Hemoglobin : 8.2 g/dL  Hematocrit : 26.1 %  Platelet Count - Automated : 417 K/uL  Mean Cell Volume : 92.2 fl  Mean Cell Hemoglobin : 29.0 pg  Mean Cell Hemoglobin Concentration : 31.4 gm/dL  Auto Neutrophil # : x  Auto Lymphocyte # : x  Auto Monocyte # : x  Auto Eosinophil # : x  Auto Basophil # : x  Auto Neutrophil % : x  Auto Lymphocyte % : x  Auto Monocyte % : x  Auto Eosinophil % : x  Auto Basophil % : x                MICROBIOLOGY:  .Blood  07-01-19   No growth to date.  --  --      .Blood  06-28-19   No growth to date.  --  --      .Blood  06-26-19   No growth at 5 days.  --  --      .Blood  06-24-19   No growth at 5 days.  --  --      .Blood  06-18-19   No growth at 5 days.  --  --      .Blood  06-17-19   No growth at 5 days.  --  --      .Urine  06-13-19   >100,000 CFU/ml Klebsiella oxytoca  >100,000 CFU/ml Pseudomonas aeruginosa (Carbapenem Resistant)  --  Klebsiella oxytoca  Pseudomonas aeruginosa (Carbapenem Resistant)      .Blood  06-13-19   Growth in anaerobic bottle: Staphylococcus epidermidis  --  Staphylococcus epidermidis        Vancomycin Level, Trough: 23.4 ug/mL (07-03-19 @ 08:54)      RADIOLOGY    < from: CT chest/Abdomen and Pelvis w/ Oral Cont (07.01.19 @ 19:34) >  Impacted left sided airways with collapse of the lingula and left lower   lobe. Underlying pneumonia is not excluded.    Large sacral decubitus ulceration with concern for sacral osteomyelitis.

## 2019-07-03 NOTE — PROGRESS NOTE ADULT - SUBJECTIVE AND OBJECTIVE BOX
MYRIAM WHITE:[id number],   69yFemale followed for:  ASA; dye contrast (Anaphylaxis)  aspirin (Short breath)  divalproex sodium (Other (Unknown))  Flowers and Plants (Short breath)  Haldol (Other (Unknown))  penicillin (Short breath; Rash)  sulfa drugs (Short breath; Rash)  vancomycin (Rash; Urticaria; Hives)  Xanax (Other (Unknown))    PAST MEDICAL & SURGICAL HISTORY:  Type 2 diabetes mellitus  Dementia  DVT, lower extremity  CVA (cerebral vascular accident)  Fatty pancreas  PNA (pneumonia)  Pulmonary HTN  IGT (impaired glucose tolerance)  Ulcerative colitis  Acid reflux  Anxiety  Depression  Mouth sores  HLD (hyperlipidemia)  Asthma  S/P percutaneous endoscopic gastrostomy (PEG) tube placement: for dysphagia  Humeral head fracture  H/O: hysterectomy  H/O cataract extraction, left  History of knee replacement    FAMILY HISTORY:  Family history of dementia (Grandparent)  Family history of colon cancer (Grandparent)    MEDICATIONS  (STANDING):  ascorbic acid 500 milliGRAM(s) Oral daily  buDESOnide  80 MICROgram(s)/formoterol 4.5 MICROgram(s) Inhaler 2 Puff(s) Inhalation two times a day  cefepime   IVPB 1000 milliGRAM(s) IV Intermittent every 8 hours  chlorhexidine 2% Cloths 1 Application(s) Topical daily  clonazePAM  Tablet 1.5 milliGRAM(s) Oral at bedtime  dextrose 5% + sodium chloride 0.9%. 1000 milliLiter(s) (50 mL/Hr) IV Continuous <Continuous>  dextrose 5% + sodium chloride 0.9%. 1000 milliLiter(s) (50 mL/Hr) IV Continuous <Continuous>  dextrose 5%. 1000 milliLiter(s) (50 mL/Hr) IV Continuous <Continuous>  dextrose 50% Injectable 12.5 Gram(s) IV Push once  dextrose 50% Injectable 25 Gram(s) IV Push once  dextrose 50% Injectable 25 Gram(s) IV Push once  diphenhydrAMINE   Injectable 25 milliGRAM(s) IV Push two times a day  ergocalciferol Drops 5000 Unit(s) Oral daily  famotidine  IVPB 20 milliGRAM(s) IV Intermittent daily  ferrous    sulfate Liquid 300 milliGRAM(s) Enteral Tube daily  gabapentin 300 milliGRAM(s) Oral two times a day  heparin  Injectable 5000 Unit(s) SubCutaneous every 12 hours  insulin lispro (HumaLOG) corrective regimen sliding scale   SubCutaneous every 6 hours  Medical Marijuana Awake Oil 2 milliLiter(s) 2 milliLiter(s) Enteral Tube <User Schedule>  Medical Marijuana Calm Oil 2 milliLiter(s) 2 milliLiter(s) Enteral Tube <User Schedule>  Medical Marijuana [place] Oil 2 milliLiter(s) 2 milliLiter(s) Enteral Tube <User Schedule>  metroNIDAZOLE  IVPB 500 milliGRAM(s) IV Intermittent every 12 hours  midodrine 5 milliGRAM(s) Oral every 8 hours  multivitamin 1 Tablet(s) Oral daily  QUEtiapine 25 milliGRAM(s) Oral daily  tiotropium 18 MICROgram(s) Capsule 1 Capsule(s) Inhalation daily  tiZANidine 4 milliGRAM(s) Oral <User Schedule>  vancomycin  IVPB 500 milliGRAM(s) IV Intermittent every 12 hours  vancomycin  IVPB      zinc sulfate 220 milliGRAM(s) Oral daily    MEDICATIONS  (PRN):  acetaminophen  Suppository .. 650 milliGRAM(s) Rectal every 6 hours PRN Temp greater or equal to 38C (100.4F), Mild Pain (1 - 3)  ALBUTerol    90 MICROgram(s) HFA Inhaler 1 Puff(s) Inhalation every 4 hours PRN Shortness of Breath and/or Wheezing  ALBUTerol/ipratropium for Nebulization 3 milliLiter(s) Nebulizer every 6 hours PRN Shortness of Breath and/or Wheezing  carboxymethylcellulose 0.5% (Preservative-Free) Ophthalmic Solution 1 Drop(s) Both EYES three times a day PRN dry eyes  dextrose 40% Gel 15 Gram(s) Oral once PRN Blood Glucose LESS THAN 70 milliGRAM(s)/deciliter  glucagon  Injectable 1 milliGRAM(s) IntraMuscular once PRN Glucose LESS THAN 70 milligrams/deciliter  nystatin Powder 1 Application(s) Topical three times a day PRN under breasts      Vital Signs Last 24 Hrs  T(C): 36.8 (03 Jul 2019 08:29), Max: 38.7 (02 Jul 2019 17:40)  T(F): 98.2 (03 Jul 2019 08:29), Max: 101.7 (02 Jul 2019 17:40)  HR: 102 (03 Jul 2019 08:29) (96 - 123)  BP: 114/54 (03 Jul 2019 08:29) (98/56 - 138/78)  BP(mean): --  RR: 20 (03 Jul 2019 08:29) (18 - 20)  SpO2: 100% (03 Jul 2019 08:29) (100% - 100%)  nc/at  s1s2  cta  soft, nt, nd no guarding or rebound  no c/c/e

## 2019-07-03 NOTE — PROGRESS NOTE ADULT - RS GEN PE MLT RESP DETAILS PC
respirations non-labored/diminished breath sounds, L
respirations non-labored/clear to auscultation bilaterally
good air movement/clear to auscultation bilaterally/respirations non-labored

## 2019-07-03 NOTE — PROGRESS NOTE ADULT - GASTROINTESTINAL DETAILS
no rebound tenderness/no guarding/no rigidity/no distention/nontender/soft
no rebound tenderness/soft
nontender/soft/no rebound tenderness/no rigidity/no guarding

## 2019-07-03 NOTE — PROGRESS NOTE ADULT - PROBLEM SELECTOR PLAN 1
-still present  -trend temps  -cont abx  -prob from aspiration/pna  -flagyl 500 mg iv q12 for anaerobic coverage  -consider WBC scan if fevers persist despite abx and negative cx  -lower vanco 750 mg iv q24  -overall prognosis poor

## 2019-07-03 NOTE — PROGRESS NOTE ADULT - SUBJECTIVE AND OBJECTIVE BOX
---___---___---___---___---___---___ ---___---___---___---___---___---___---___---___---                  M E D I C A L   A T T E N D I N G   P R O G R E S S   N O T E  ---___---___---___---___---___---___ ---___---___---___---___---___---___---___---___---        ================================================    ++CHIEF COMPLAINT:   Patient is a 69y old  Female who presents with a chief complaint of PEG tube dislodgement, fever, leukocytosis (2019 09:31)      Gastrostomy malfunction    ---___---___---___---___---___---  PAST MEDICAL / Surgical  HISTORY:  PAST MEDICAL & SURGICAL HISTORY:  Type 2 diabetes mellitus  Dementia  DVT, lower extremity  CVA (cerebral vascular accident)  Fatty pancreas  PNA (pneumonia)  Pulmonary HTN  IGT (impaired glucose tolerance)  Ulcerative colitis  Acid reflux  Anxiety  Depression  Mouth sores  HLD (hyperlipidemia)  Asthma  S/P percutaneous endoscopic gastrostomy (PEG) tube placement: for dysphagia  Humeral head fracture  H/O: hysterectomy  H/O cataract extraction, left  History of knee replacement      ---___---___---___---___---___---  FAMILY HISTORY:   FAMILY HISTORY:  Family history of dementia (Grandparent)  Family history of colon cancer (Grandparent)        ---___---___---___---___---___---  ALLERGIES:   Allergies    ASA; dye contrast (Anaphylaxis)  aspirin (Short breath)  divalproex sodium (Other (Unknown))  Flowers and Plants (Short breath)  Haldol (Other (Unknown))  penicillin (Short breath; Rash)  sulfa drugs (Short breath; Rash)  vancomycin (Rash; Urticaria; Hives)  Xanax (Other (Unknown))    Intolerances        ---___---___---___---___---___---  MEDICATIONS:  MEDICATIONS  (STANDING):  ascorbic acid 500 milliGRAM(s) Oral daily  buDESOnide  80 MICROgram(s)/formoterol 4.5 MICROgram(s) Inhaler 2 Puff(s) Inhalation two times a day  cefepime   IVPB 1000 milliGRAM(s) IV Intermittent every 8 hours  chlorhexidine 2% Cloths 1 Application(s) Topical daily  clonazePAM  Tablet 1.5 milliGRAM(s) Oral at bedtime  dextrose 5% + sodium chloride 0.9%. 1000 milliLiter(s) (50 mL/Hr) IV Continuous <Continuous>  dextrose 5%. 1000 milliLiter(s) (50 mL/Hr) IV Continuous <Continuous>  dextrose 50% Injectable 12.5 Gram(s) IV Push once  dextrose 50% Injectable 25 Gram(s) IV Push once  dextrose 50% Injectable 25 Gram(s) IV Push once  diphenhydrAMINE   Injectable 25 milliGRAM(s) IV Push two times a day  ergocalciferol Drops 5000 Unit(s) Oral daily  famotidine  IVPB 20 milliGRAM(s) IV Intermittent daily  ferrous    sulfate Liquid 300 milliGRAM(s) Enteral Tube daily  gabapentin 300 milliGRAM(s) Oral two times a day  heparin  Injectable 5000 Unit(s) SubCutaneous every 12 hours  insulin lispro (HumaLOG) corrective regimen sliding scale   SubCutaneous every 6 hours  Medical Marijuana [place] Oil 2 milliLiter(s) 2 milliLiter(s) Enteral Tube <User Schedule>  Medical Marijuana Calm Oil 2 milliLiter(s) 2 milliLiter(s) Enteral Tube <User Schedule>  Medical Marijuana [place] Oil 2 milliLiter(s) 2 milliLiter(s) Enteral Tube <User Schedule>  metroNIDAZOLE  IVPB 500 milliGRAM(s) IV Intermittent every 12 hours  midodrine 5 milliGRAM(s) Oral every 8 hours  multivitamin 1 Tablet(s) Oral daily  QUEtiapine 25 milliGRAM(s) Oral daily  tiotropium 18 MICROgram(s) Capsule 1 Capsule(s) Inhalation daily  tiZANidine 4 milliGRAM(s) Oral <User Schedule>  zinc sulfate 220 milliGRAM(s) Oral daily    MEDICATIONS  (PRN):  acetaminophen  Suppository .. 650 milliGRAM(s) Rectal every 6 hours PRN Temp greater or equal to 38C (100.4F), Mild Pain (1 - 3)  ALBUTerol    90 MICROgram(s) HFA Inhaler 1 Puff(s) Inhalation every 4 hours PRN Shortness of Breath and/or Wheezing  ALBUTerol/ipratropium for Nebulization 3 milliLiter(s) Nebulizer every 6 hours PRN Shortness of Breath and/or Wheezing  carboxymethylcellulose 0.5% (Preservative-Free) Ophthalmic Solution 1 Drop(s) Both EYES three times a day PRN dry eyes  dextrose 40% Gel 15 Gram(s) Oral once PRN Blood Glucose LESS THAN 70 milliGRAM(s)/deciliter  glucagon  Injectable 1 milliGRAM(s) IntraMuscular once PRN Glucose LESS THAN 70 milligrams/deciliter  nystatin Powder 1 Application(s) Topical three times a day PRN under breasts      ---___---___---___---___---___---  REVIEW OF SYSTEM:    GEN: no fever, no chills, no pain  RESP: no SOB, no cough, no sputum  CVS: no chest pain, no palpitations, no edema  GI: no abdominal pain, no nausea, no vomiting, no constipation, no diarrhea  : no dysurea, no frequency, no hematurea  Neuro: no headache, no dizziness  PSYCH: no anxiety, no depression  Derm : no itching, no rash    ---___---___---___---___---___---  VITAL SIGNS:  69y , CAPILLARY BLOOD GLUCOSE      POCT Blood Glucose.: 176 mg/dL (2019 12:21)    T(C): 36.5 (19 @ 14:31), Max: 38.7 (19 @ 17:40)  HR: 91 (19 @ 14:31) (91 - 123)  BP: 142/67 (19 @ 14:31) (98/56 - 142/67)  RR: 20 (19 @ 14:31) (18 - 20)  SpO2: 97% (19 @ 14:31) (97% - 100%)  ---___---___---___---___---___---  PHYSICAL EXAM:    GEN: A&O X 3 , NAD , comfortable  HEENT: NCAT, PERRL, MMM, hearing intact  Neck: supple , no JVD  CVS: S1S2 , regular , No M/R/G appreciated  PULM: CTA B/L,  no W/R/R appreciated  ABD.: soft. non tender, non distended,  bowel sounds present  Extrem: intact pulses , no edema   Derm: No rash , no ecchymoses  PSYCH : normal mood,  no delusion not anxious     ---___---___---___---___---___---            LAB AND IMAGIN.2    13.03 )-----------( 417      ( 2019 11:56 )             26.1                                                 [All pertinent / recent Imaging reviewed]         ---___---___---___---___---___---___ ---___---___---___---___---                         A S S E S S M E N T   A N D   P L A N :      HEALTH ISSUES - PROBLEM Dx:  Fever: Fever sepsis   continue abx and id following    aspirtion pneumonia  id on board    [functional quadraplegia   supportive care    agitation continue meds     will get midline to help with getting blood since patient is hard stick    continue medical marijuana fr chronic pain     Dr lamb to cover 19                  -GI/DVT Prophylaxis.    --------------------------------------------  Case discussed with   Education given on   ___________________________  Thank elmer,  Deniz Bolden  [phone]

## 2019-07-04 LAB
ANION GAP SERPL CALC-SCNC: 14 MMOL/L — SIGNIFICANT CHANGE UP (ref 5–17)
BASOPHILS # BLD AUTO: 0.06 K/UL — SIGNIFICANT CHANGE UP (ref 0–0.2)
BASOPHILS NFR BLD AUTO: 0.5 % — SIGNIFICANT CHANGE UP (ref 0–2)
BUN SERPL-MCNC: 54 MG/DL — HIGH (ref 7–23)
CALCIUM SERPL-MCNC: 9.5 MG/DL — SIGNIFICANT CHANGE UP (ref 8.4–10.5)
CHLORIDE SERPL-SCNC: 100 MMOL/L — SIGNIFICANT CHANGE UP (ref 96–108)
CO2 SERPL-SCNC: 21 MMOL/L — LOW (ref 22–31)
CREAT SERPL-MCNC: 1.12 MG/DL — SIGNIFICANT CHANGE UP (ref 0.5–1.3)
EOSINOPHIL # BLD AUTO: 0.56 K/UL — HIGH (ref 0–0.5)
EOSINOPHIL NFR BLD AUTO: 4.9 % — SIGNIFICANT CHANGE UP (ref 0–6)
GLUCOSE BLDC GLUCOMTR-MCNC: 126 MG/DL — HIGH (ref 70–99)
GLUCOSE BLDC GLUCOMTR-MCNC: 155 MG/DL — HIGH (ref 70–99)
GLUCOSE BLDC GLUCOMTR-MCNC: 156 MG/DL — HIGH (ref 70–99)
GLUCOSE BLDC GLUCOMTR-MCNC: 190 MG/DL — HIGH (ref 70–99)
GLUCOSE SERPL-MCNC: 143 MG/DL — HIGH (ref 70–99)
HCT VFR BLD CALC: 23.8 % — LOW (ref 34.5–45)
HGB BLD-MCNC: 7.5 G/DL — LOW (ref 11.5–15.5)
IMM GRANULOCYTES NFR BLD AUTO: 0.8 % — SIGNIFICANT CHANGE UP (ref 0–1.5)
LYMPHOCYTES # BLD AUTO: 0.81 K/UL — LOW (ref 1–3.3)
LYMPHOCYTES # BLD AUTO: 7 % — LOW (ref 13–44)
MAGNESIUM SERPL-MCNC: 1.8 MG/DL — SIGNIFICANT CHANGE UP (ref 1.6–2.6)
MCHC RBC-ENTMCNC: 29 PG — SIGNIFICANT CHANGE UP (ref 27–34)
MCHC RBC-ENTMCNC: 31.5 GM/DL — LOW (ref 32–36)
MCV RBC AUTO: 91.9 FL — SIGNIFICANT CHANGE UP (ref 80–100)
MONOCYTES # BLD AUTO: 1.42 K/UL — HIGH (ref 0–0.9)
MONOCYTES NFR BLD AUTO: 12.3 % — SIGNIFICANT CHANGE UP (ref 2–14)
NEUTROPHILS # BLD AUTO: 8.57 K/UL — HIGH (ref 1.8–7.4)
NEUTROPHILS NFR BLD AUTO: 74.5 % — SIGNIFICANT CHANGE UP (ref 43–77)
PLATELET # BLD AUTO: 409 K/UL — HIGH (ref 150–400)
POTASSIUM SERPL-MCNC: 4.8 MMOL/L — SIGNIFICANT CHANGE UP (ref 3.5–5.3)
POTASSIUM SERPL-SCNC: 4.8 MMOL/L — SIGNIFICANT CHANGE UP (ref 3.5–5.3)
RBC # BLD: 2.59 M/UL — LOW (ref 3.8–5.2)
RBC # FLD: 15.1 % — HIGH (ref 10.3–14.5)
SODIUM SERPL-SCNC: 135 MMOL/L — SIGNIFICANT CHANGE UP (ref 135–145)
WBC # BLD: 11.51 K/UL — HIGH (ref 3.8–10.5)
WBC # FLD AUTO: 11.51 K/UL — HIGH (ref 3.8–10.5)

## 2019-07-04 PROCEDURE — 99232 SBSQ HOSP IP/OBS MODERATE 35: CPT

## 2019-07-04 RX ORDER — ACETAMINOPHEN 500 MG
500 TABLET ORAL ONCE
Refills: 0 | Status: COMPLETED | OUTPATIENT
Start: 2019-07-04 | End: 2019-07-04

## 2019-07-04 RX ADMIN — ERGOCALCIFEROL 5000 UNIT(S): 1.25 CAPSULE ORAL at 15:12

## 2019-07-04 RX ADMIN — FAMOTIDINE 100 MILLIGRAM(S): 10 INJECTION INTRAVENOUS at 15:12

## 2019-07-04 RX ADMIN — QUETIAPINE FUMARATE 25 MILLIGRAM(S): 200 TABLET, FILM COATED ORAL at 21:40

## 2019-07-04 RX ADMIN — Medication 500 MILLIGRAM(S): at 12:08

## 2019-07-04 RX ADMIN — BUDESONIDE AND FORMOTEROL FUMARATE DIHYDRATE 2 PUFF(S): 160; 4.5 AEROSOL RESPIRATORY (INHALATION) at 19:02

## 2019-07-04 RX ADMIN — Medication 300 MILLIGRAM(S): at 12:08

## 2019-07-04 RX ADMIN — TIZANIDINE 4 MILLIGRAM(S): 4 TABLET ORAL at 20:33

## 2019-07-04 RX ADMIN — Medication 3 MILLILITER(S): at 12:06

## 2019-07-04 RX ADMIN — HEPARIN SODIUM 5000 UNIT(S): 5000 INJECTION INTRAVENOUS; SUBCUTANEOUS at 19:02

## 2019-07-04 RX ADMIN — GABAPENTIN 300 MILLIGRAM(S): 400 CAPSULE ORAL at 20:32

## 2019-07-04 RX ADMIN — HEPARIN SODIUM 5000 UNIT(S): 5000 INJECTION INTRAVENOUS; SUBCUTANEOUS at 06:20

## 2019-07-04 RX ADMIN — MIDODRINE HYDROCHLORIDE 5 MILLIGRAM(S): 2.5 TABLET ORAL at 15:14

## 2019-07-04 RX ADMIN — Medication 3 MILLILITER(S): at 06:31

## 2019-07-04 RX ADMIN — Medication 100 MILLIGRAM(S): at 04:41

## 2019-07-04 RX ADMIN — Medication 1.5 MILLIGRAM(S): at 21:39

## 2019-07-04 RX ADMIN — TIZANIDINE 4 MILLIGRAM(S): 4 TABLET ORAL at 09:17

## 2019-07-04 RX ADMIN — GABAPENTIN 300 MILLIGRAM(S): 400 CAPSULE ORAL at 09:16

## 2019-07-04 RX ADMIN — Medication 1: at 15:11

## 2019-07-04 RX ADMIN — ZINC SULFATE TAB 220 MG (50 MG ZINC EQUIVALENT) 220 MILLIGRAM(S): 220 (50 ZN) TAB at 12:08

## 2019-07-04 RX ADMIN — Medication 100 MILLIGRAM(S): at 15:13

## 2019-07-04 RX ADMIN — Medication 25 MILLIGRAM(S): at 09:17

## 2019-07-04 RX ADMIN — CEFEPIME 100 MILLIGRAM(S): 1 INJECTION, POWDER, FOR SOLUTION INTRAMUSCULAR; INTRAVENOUS at 20:32

## 2019-07-04 RX ADMIN — TIOTROPIUM BROMIDE 1 CAPSULE(S): 18 CAPSULE ORAL; RESPIRATORY (INHALATION) at 12:06

## 2019-07-04 RX ADMIN — Medication 250 MILLIGRAM(S): at 10:52

## 2019-07-04 RX ADMIN — CEFEPIME 100 MILLIGRAM(S): 1 INJECTION, POWDER, FOR SOLUTION INTRAMUSCULAR; INTRAVENOUS at 09:16

## 2019-07-04 RX ADMIN — Medication 1 DROP(S): at 12:06

## 2019-07-04 RX ADMIN — Medication 1: at 06:20

## 2019-07-04 RX ADMIN — MIDODRINE HYDROCHLORIDE 5 MILLIGRAM(S): 2.5 TABLET ORAL at 21:41

## 2019-07-04 RX ADMIN — Medication 3 MILLILITER(S): at 00:11

## 2019-07-04 RX ADMIN — Medication 500 MILLIGRAM(S): at 22:00

## 2019-07-04 RX ADMIN — MIDODRINE HYDROCHLORIDE 5 MILLIGRAM(S): 2.5 TABLET ORAL at 06:20

## 2019-07-04 RX ADMIN — CEFEPIME 100 MILLIGRAM(S): 1 INJECTION, POWDER, FOR SOLUTION INTRAMUSCULAR; INTRAVENOUS at 02:21

## 2019-07-04 RX ADMIN — Medication 1: at 00:06

## 2019-07-04 RX ADMIN — Medication 3 MILLILITER(S): at 19:02

## 2019-07-04 RX ADMIN — CHLORHEXIDINE GLUCONATE 1 APPLICATION(S): 213 SOLUTION TOPICAL at 12:05

## 2019-07-04 RX ADMIN — Medication 200 MILLIGRAM(S): at 21:34

## 2019-07-04 RX ADMIN — Medication 15 MILLILITER(S): at 12:07

## 2019-07-04 NOTE — PROGRESS NOTE ADULT - SUBJECTIVE AND OBJECTIVE BOX
SUBJECTIVE / OVERNIGHT EVENTS: pt seen and examined pts  next to pts bed      MEDICATIONS  (STANDING):  ALBUTerol/ipratropium for Nebulization. 3 milliLiter(s) Nebulizer four times a day  ascorbic acid 500 milliGRAM(s) Oral daily  buDESOnide  80 MICROgram(s)/formoterol 4.5 MICROgram(s) Inhaler 2 Puff(s) Inhalation two times a day  cefepime   IVPB 1000 milliGRAM(s) IV Intermittent every 8 hours  chlorhexidine 2% Cloths 1 Application(s) Topical daily  clonazePAM  Tablet 1.5 milliGRAM(s) Oral at bedtime  dextrose 5% + sodium chloride 0.9%. 1000 milliLiter(s) (50 mL/Hr) IV Continuous <Continuous>  dextrose 5%. 1000 milliLiter(s) (50 mL/Hr) IV Continuous <Continuous>  dextrose 50% Injectable 12.5 Gram(s) IV Push once  dextrose 50% Injectable 25 Gram(s) IV Push once  dextrose 50% Injectable 25 Gram(s) IV Push once  diphenhydrAMINE   Injectable 25 milliGRAM(s) IV Push two times a day  ergocalciferol Drops 5000 Unit(s) Oral daily  famotidine  IVPB 20 milliGRAM(s) IV Intermittent daily  ferrous    sulfate Liquid 300 milliGRAM(s) Enteral Tube daily  gabapentin 300 milliGRAM(s) Oral two times a day  heparin  Injectable 5000 Unit(s) SubCutaneous every 12 hours  insulin lispro (HumaLOG) corrective regimen sliding scale   SubCutaneous every 6 hours  Medical Marijuana Awake Oil 2 milliLiter(s) 2 milliLiter(s) Enteral Tube <User Schedule>  Medical Marijuana Calm Oil 2 milliLiter(s) 2 milliLiter(s) Enteral Tube <User Schedule>  Medical Marijuana [place] Oil 2 milliLiter(s) 2 milliLiter(s) Enteral Tube <User Schedule>  metroNIDAZOLE  IVPB 500 milliGRAM(s) IV Intermittent every 12 hours  midodrine 5 milliGRAM(s) Oral every 8 hours  multivitamin/minerals/iron Oral Solution (CENTRUM) 15 milliLiter(s) Oral daily  QUEtiapine 25 milliGRAM(s) Oral daily  tiotropium 18 MICROgram(s) Capsule 1 Capsule(s) Inhalation daily  tiZANidine 4 milliGRAM(s) Oral <User Schedule>  vancomycin  IVPB 750 milliGRAM(s) IV Intermittent daily  zinc sulfate 220 milliGRAM(s) Oral daily    MEDICATIONS  (PRN):  acetaminophen  Suppository .. 650 milliGRAM(s) Rectal every 6 hours PRN Temp greater or equal to 38C (100.4F), Mild Pain (1 - 3)  carboxymethylcellulose 0.5% (Preservative-Free) Ophthalmic Solution 1 Drop(s) Both EYES three times a day PRN dry eyes  dextrose 40% Gel 15 Gram(s) Oral once PRN Blood Glucose LESS THAN 70 milliGRAM(s)/deciliter  glucagon  Injectable 1 milliGRAM(s) IntraMuscular once PRN Glucose LESS THAN 70 milligrams/deciliter  nystatin Powder 1 Application(s) Topical three times a day PRN under breasts      Vital Signs Last 24 Hrs  T(C): 36.6 (04 Jul 2019 06:31), Max: 38.2 (03 Jul 2019 20:30)  T(F): 97.9 (04 Jul 2019 06:31), Max: 100.7 (03 Jul 2019 20:30)  HR: 100 (04 Jul 2019 06:31) (89 - 101)  BP: 111/66 (04 Jul 2019 06:31) (111/66 - 119/54)  BP(mean): --  RR: 18 (04 Jul 2019 06:31) (18 - 20)  SpO2: 100% (04 Jul 2019 06:31) (96% - 100%)  CAPILLARY BLOOD GLUCOSE      POCT Blood Glucose.: 126 mg/dL (04 Jul 2019 18:54)  POCT Blood Glucose.: 156 mg/dL (04 Jul 2019 17:02)  POCT Blood Glucose.: 190 mg/dL (04 Jul 2019 15:09)  POCT Blood Glucose.: 155 mg/dL (04 Jul 2019 05:59)  POCT Blood Glucose.: 188 mg/dL (03 Jul 2019 23:56)    I&O's Summary    03 Jul 2019 07:01  -  04 Jul 2019 07:00  --------------------------------------------------------  IN: 490 mL / OUT: 1200 mL / NET: -710 mL      unable to obtain ROS from p-t sec to pts cognitive status    PHYSICAL EXAM:  GENERAL: NAD  EYES: EOMI, PERRLA  NECK: Supple, No JVD  CHEST/LUNG: dec breath sounds at bases , rhonchi + ant   HEART:  S1 , S2 +  ABDOMEN: soft , bs+, peg +  EXTREMITIES:  contracted   NEUROLOGY: waxing, waning mental status , not following commands      LABS:                        7.5    11.51 )-----------( 409      ( 04 Jul 2019 13:00 )             23.8     07-04    135  |  100  |  54<H>  ----------------------------<  143<H>  4.8   |  21<L>  |  1.12    Ca    9.5      04 Jul 2019 11:07  Mg     1.8     07-04                RADIOLOGY & ADDITIONAL TESTS:    Imaging Personally Reviewed:    Consultant(s) Notes Reviewed:      Care Discussed with Consultants/Other Providers:

## 2019-07-04 NOTE — PROGRESS NOTE ADULT - ASSESSMENT
70 yo female with multiple medical problems, demented, bedbound and contracted, with collapsed lingula and LLL    - aspiration precautions, nebs, suppl oxygen , abx as per ID , chest PT  -  - continue medical marijuana from chronic pain

## 2019-07-04 NOTE — PROGRESS NOTE ADULT - SUBJECTIVE AND OBJECTIVE BOX
PULMONARY CONSULT NOTE      MYRIAM HEATH  MRN-[id number]    Patient is a 69y old  Female who presents with a chief complaint of PEG tube dislodgement, fever, leukocytosis (03 Jul 2019 16:04)       at bedside, pt sleeping, contracted        REVIEW OF SYSTEMS:unable to obtain from patient    Vital Signs Last 24 Hrs  T(C): 36.6 (04 Jul 2019 06:31), Max: 38.3 (03 Jul 2019 18:37)  T(F): 97.9 (04 Jul 2019 06:31), Max: 100.9 (03 Jul 2019 18:37)  HR: 100 (04 Jul 2019 06:31) (89 - 101)  BP: 111/66 (04 Jul 2019 06:31) (111/66 - 142/67)  BP(mean): --  RR: 18 (04 Jul 2019 06:31) (18 - 20)  SpO2: 100% (04 Jul 2019 06:31) (96% - 100%)  PHYSICAL EXAMINATION:    GENERAL: no acute distress, sleeping    LUNGS: no labored breathing; poor effort, no wheeze    ABDOMEN:  +PEG       MEDICATIONS  (STANDING):  ALBUTerol/ipratropium for Nebulization. 3 milliLiter(s) Nebulizer four times a day  ascorbic acid 500 milliGRAM(s) Oral daily  buDESOnide  80 MICROgram(s)/formoterol 4.5 MICROgram(s) Inhaler 2 Puff(s) Inhalation two times a day  cefepime   IVPB 1000 milliGRAM(s) IV Intermittent every 8 hours  chlorhexidine 2% Cloths 1 Application(s) Topical daily  clonazePAM  Tablet 1.5 milliGRAM(s) Oral at bedtime  dextrose 5% + sodium chloride 0.9%. 1000 milliLiter(s) (50 mL/Hr) IV Continuous <Continuous>  dextrose 5%. 1000 milliLiter(s) (50 mL/Hr) IV Continuous <Continuous>  dextrose 50% Injectable 12.5 Gram(s) IV Push once  dextrose 50% Injectable 25 Gram(s) IV Push once  dextrose 50% Injectable 25 Gram(s) IV Push once  diphenhydrAMINE   Injectable 25 milliGRAM(s) IV Push two times a day  ergocalciferol Drops 5000 Unit(s) Oral daily  famotidine  IVPB 20 milliGRAM(s) IV Intermittent daily  ferrous    sulfate Liquid 300 milliGRAM(s) Enteral Tube daily  gabapentin 300 milliGRAM(s) Oral two times a day  heparin  Injectable 5000 Unit(s) SubCutaneous every 12 hours  insulin lispro (HumaLOG) corrective regimen sliding scale   SubCutaneous every 6 hours  Medical Marijuana Awake Oil 2 milliLiter(s) 2 milliLiter(s) Enteral Tube <User Schedule>  Medical Marijuana Calm Oil 2 milliLiter(s) 2 milliLiter(s) Enteral Tube <User Schedule>  Medical Marijuana [place] Oil 2 milliLiter(s) 2 milliLiter(s) Enteral Tube <User Schedule>  metroNIDAZOLE  IVPB 500 milliGRAM(s) IV Intermittent every 12 hours  midodrine 5 milliGRAM(s) Oral every 8 hours  multivitamin/minerals/iron Oral Solution (CENTRUM) 15 milliLiter(s) Oral daily  QUEtiapine 25 milliGRAM(s) Oral daily  tiotropium 18 MICROgram(s) Capsule 1 Capsule(s) Inhalation daily  tiZANidine 4 milliGRAM(s) Oral <User Schedule>  vancomycin  IVPB 750 milliGRAM(s) IV Intermittent daily  zinc sulfate 220 milliGRAM(s) Oral daily    MEDICATIONS  (PRN):  acetaminophen  Suppository .. 650 milliGRAM(s) Rectal every 6 hours PRN Temp greater or equal to 38C (100.4F), Mild Pain (1 - 3)  carboxymethylcellulose 0.5% (Preservative-Free) Ophthalmic Solution 1 Drop(s) Both EYES three times a day PRN dry eyes  dextrose 40% Gel 15 Gram(s) Oral once PRN Blood Glucose LESS THAN 70 milliGRAM(s)/deciliter  glucagon  Injectable 1 milliGRAM(s) IntraMuscular once PRN Glucose LESS THAN 70 milligrams/deciliter  nystatin Powder 1 Application(s) Topical three times a day PRN under breasts          LABS:                          8.2    13.03 )-----------( 417      ( 03 Jul 2019 11:56 )             26.1   07-04    135  |  100  |  54<H>  ----------------------------<  143<H>  4.8   |  21<L>  |  1.12    Ca    9.5      04 Jul 2019 11:07  Mg     1.8     07-04            LUNGS AND LARGE AIRWAYS: Airway impaction of the left main bronchus and   segmental bronchi with collapse of the lingula and left lower lobes.   PLEURA: Small left pleural effusion. No pneumothorax.  VESSELS: Atherosclerotic changes.  HEART: Heart size is normal. No pericardial effusion. Coronary artery   calcifications.  MEDIASTINUM AND DILLAN: No lymphadenopathy. Mildly dilated esophagus with   internal debris.  CHEST WALL AND LOWER NECK: Within normal limits.    ABDOMEN AND PELVIS:    LIVER: Within normal limits.  BILE DUCTS: Normal caliber.  GALLBLADDER: Cholecystectomy.  SPLEEN: Within normal limits.  PANCREAS: Fatty atrophy..  ADRENALS: Within normal limits.  KIDNEYS/URETERS: Within normal limits.    BLADDER: Collapsed around a Blackman catheter.  REPRODUCTIVE ORGANS: Hysterectomy.    BOWEL: PEG tube with tip in the stomach. Mild soft tissue thickening   along the tractof the tube in the anterior abdominal wall without   associated collection. No bowel obstruction. Appendix is normal.  PERITONEUM: No ascites.  VESSELS:  Atherosclerotic changes.  RETROPERITONEUM: No lymphadenopathy.    ABDOMINAL WALL: Anasarca. Large sacral decubitus ulcer.  BONES: Scoliosis with degenerative changes. Chronic severe compression   deformity of L2 with retropulsion of bony fragments. Right hip   replacement with partial dislocation, unchanged. Heterogeneous sclerosis   of the sacrum raising concern for osteomyelitis    IMPRESSION:     Impacted left sided airways with collapse of the lingula and left lower   lobe. Underlying pneumonia is not excluded.    Large sacral decubitus ulceration with concern for sacral osteomyelitis.      ASSESSMENT:  68 yo female with multiple medical problems, demented, bedbound and contracted, with collapsed lingula and LLL    PLAN:   abx as per ID  aspiration precautions  chest PT  neb around the clock  prognosis very poor      Thank you for allowing me to participate in the care of this patient.  Please feel free to call me for any questions/concerns.    Luciana Mancini MD  [facility] Pulmonary/Sleep Medicine  [phone]

## 2019-07-04 NOTE — PROGRESS NOTE ADULT - SUBJECTIVE AND OBJECTIVE BOX
MYRIAM WHITE:[id number],   69yFemale followed for:  ASA; dye contrast (Anaphylaxis)  aspirin (Short breath)  divalproex sodium (Other (Unknown))  Flowers and Plants (Short breath)  Haldol (Other (Unknown))  penicillin (Short breath; Rash)  sulfa drugs (Short breath; Rash)  vancomycin (Rash; Urticaria; Hives)  Xanax (Other (Unknown))    PAST MEDICAL & SURGICAL HISTORY:  Type 2 diabetes mellitus  Dementia  DVT, lower extremity  CVA (cerebral vascular accident)  Fatty pancreas  PNA (pneumonia)  Pulmonary HTN  IGT (impaired glucose tolerance)  Ulcerative colitis  Acid reflux  Anxiety  Depression  Mouth sores  HLD (hyperlipidemia)  Asthma  S/P percutaneous endoscopic gastrostomy (PEG) tube placement: for dysphagia  Humeral head fracture  H/O: hysterectomy  H/O cataract extraction, left  History of knee replacement    FAMILY HISTORY:  Family history of dementia (Grandparent)  Family history of colon cancer (Grandparent)    MEDICATIONS  (STANDING):  ALBUTerol/ipratropium for Nebulization. 3 milliLiter(s) Nebulizer four times a day  ascorbic acid 500 milliGRAM(s) Oral daily  buDESOnide  80 MICROgram(s)/formoterol 4.5 MICROgram(s) Inhaler 2 Puff(s) Inhalation two times a day  cefepime   IVPB 1000 milliGRAM(s) IV Intermittent every 8 hours  chlorhexidine 2% Cloths 1 Application(s) Topical daily  clonazePAM  Tablet 1.5 milliGRAM(s) Oral at bedtime  dextrose 5% + sodium chloride 0.9%. 1000 milliLiter(s) (50 mL/Hr) IV Continuous <Continuous>  dextrose 5%. 1000 milliLiter(s) (50 mL/Hr) IV Continuous <Continuous>  dextrose 50% Injectable 12.5 Gram(s) IV Push once  dextrose 50% Injectable 25 Gram(s) IV Push once  dextrose 50% Injectable 25 Gram(s) IV Push once  diphenhydrAMINE   Injectable 25 milliGRAM(s) IV Push two times a day  ergocalciferol Drops 5000 Unit(s) Oral daily  famotidine  IVPB 20 milliGRAM(s) IV Intermittent daily  ferrous    sulfate Liquid 300 milliGRAM(s) Enteral Tube daily  gabapentin 300 milliGRAM(s) Oral two times a day  heparin  Injectable 5000 Unit(s) SubCutaneous every 12 hours  insulin lispro (HumaLOG) corrective regimen sliding scale   SubCutaneous every 6 hours  Medical Marijuana Awake Oil 2 milliLiter(s) 2 milliLiter(s) Enteral Tube <User Schedule>  Medical Marijuana Calm Oil 2 milliLiter(s) 2 milliLiter(s) Enteral Tube <User Schedule>  Medical Marijuana [place] Oil 2 milliLiter(s) 2 milliLiter(s) Enteral Tube <User Schedule>  metroNIDAZOLE  IVPB 500 milliGRAM(s) IV Intermittent every 12 hours  midodrine 5 milliGRAM(s) Oral every 8 hours  multivitamin/minerals/iron Oral Solution (CENTRUM) 15 milliLiter(s) Oral daily  QUEtiapine 25 milliGRAM(s) Oral daily  tiotropium 18 MICROgram(s) Capsule 1 Capsule(s) Inhalation daily  tiZANidine 4 milliGRAM(s) Oral <User Schedule>  vancomycin  IVPB 750 milliGRAM(s) IV Intermittent daily  zinc sulfate 220 milliGRAM(s) Oral daily    MEDICATIONS  (PRN):  acetaminophen  Suppository .. 650 milliGRAM(s) Rectal every 6 hours PRN Temp greater or equal to 38C (100.4F), Mild Pain (1 - 3)  carboxymethylcellulose 0.5% (Preservative-Free) Ophthalmic Solution 1 Drop(s) Both EYES three times a day PRN dry eyes  dextrose 40% Gel 15 Gram(s) Oral once PRN Blood Glucose LESS THAN 70 milliGRAM(s)/deciliter  glucagon  Injectable 1 milliGRAM(s) IntraMuscular once PRN Glucose LESS THAN 70 milligrams/deciliter  nystatin Powder 1 Application(s) Topical three times a day PRN under breasts      Vital Signs Last 24 Hrs  T(C): 36.6 (04 Jul 2019 06:31), Max: 38.3 (03 Jul 2019 18:37)  T(F): 97.9 (04 Jul 2019 06:31), Max: 100.9 (03 Jul 2019 18:37)  HR: 100 (04 Jul 2019 06:31) (89 - 101)  BP: 111/66 (04 Jul 2019 06:31) (111/66 - 142/67)  BP(mean): --  RR: 18 (04 Jul 2019 06:31) (18 - 20)  SpO2: 100% (04 Jul 2019 06:31) (96% - 100%)  nc/at  s1s2  cta  soft, nt, nd no guarding or rebound  no c/c/e    CBC Full  -  ( 03 Jul 2019 11:56 )  WBC Count : 13.03 K/uL  RBC Count : 2.83 M/uL  Hemoglobin : 8.2 g/dL  Hematocrit : 26.1 %  Platelet Count - Automated : 417 K/uL  Mean Cell Volume : 92.2 fl  Mean Cell Hemoglobin : 29.0 pg  Mean Cell Hemoglobin Concentration : 31.4 gm/dL  Auto Neutrophil # : x  Auto Lymphocyte # : x  Auto Monocyte # : x  Auto Eosinophil # : x  Auto Basophil # : x  Auto Neutrophil % : x  Auto Lymphocyte % : x  Auto Monocyte % : x  Auto Eosinophil % : x  Auto Basophil % : x

## 2019-07-04 NOTE — CHART NOTE - NSCHARTNOTEFT_GEN_A_CORE
MEDICINE PA    Notified by RN patient with temperature of 'F. Seen and examined patient at bedside. Patient is alert in NAD. Denies HA, lightheadedness, vision disturbances, CP, SOB, cough, N/V, abd pain, dysuria.    VITAL SIGNS:  T(C): 38.4 (07-04-19 @ 20:30), Max: 38.4 (07-04-19 @ 20:30)  HR: 111 (07-04-19 @ 20:30) (89 - 111)  BP: 122/77 (07-04-19 @ 20:30) (111/66 - 122/77)  RR: 20 (07-04-19 @ 20:30) (18 - 20)  SpO2: 96% (07-04-19 @ 20:30) (96% - 100%)  Wt(kg): --      LABORATORY:                          7.5    11.51 )-----------( 409      ( 04 Jul 2019 13:00 )             23.8       07-04    135  |  100  |  54<H>  ----------------------------<  143<H>  4.8   |  21<L>  |  1.12    Ca    9.5      04 Jul 2019 11:07  Mg     1.8     07-04            MICROBIOLOGY:     MEDICATIONS  (STANDING):  ALBUTerol/ipratropium for Nebulization. 3 milliLiter(s) Nebulizer four times a day  ascorbic acid 500 milliGRAM(s) Oral daily  buDESOnide  80 MICROgram(s)/formoterol 4.5 MICROgram(s) Inhaler 2 Puff(s) Inhalation two times a day  cefepime   IVPB 1000 milliGRAM(s) IV Intermittent every 8 hours  chlorhexidine 2% Cloths 1 Application(s) Topical daily  clonazePAM  Tablet 1.5 milliGRAM(s) Oral at bedtime  dextrose 5% + sodium chloride 0.9%. 1000 milliLiter(s) (50 mL/Hr) IV Continuous <Continuous>  dextrose 5%. 1000 milliLiter(s) (50 mL/Hr) IV Continuous <Continuous>  dextrose 50% Injectable 12.5 Gram(s) IV Push once  dextrose 50% Injectable 25 Gram(s) IV Push once  dextrose 50% Injectable 25 Gram(s) IV Push once  diphenhydrAMINE   Injectable 25 milliGRAM(s) IV Push two times a day  ergocalciferol Drops 5000 Unit(s) Oral daily  famotidine  IVPB 20 milliGRAM(s) IV Intermittent daily  ferrous    sulfate Liquid 300 milliGRAM(s) Enteral Tube daily  gabapentin 300 milliGRAM(s) Oral two times a day  heparin  Injectable 5000 Unit(s) SubCutaneous every 12 hours  insulin lispro (HumaLOG) corrective regimen sliding scale   SubCutaneous every 6 hours  Medical Marijuana Awake Oil 2 milliLiter(s) 2 milliLiter(s) Enteral Tube <User Schedule>  Medical Marijuana Calm Oil 2 milliLiter(s) 2 milliLiter(s) Enteral Tube <User Schedule>  Medical Marijuana [place] Oil 2 milliLiter(s) 2 milliLiter(s) Enteral Tube <User Schedule>  metroNIDAZOLE  IVPB 500 milliGRAM(s) IV Intermittent every 12 hours  midodrine 5 milliGRAM(s) Oral every 8 hours  multivitamin/minerals/iron Oral Solution (CENTRUM) 15 milliLiter(s) Oral daily  QUEtiapine 25 milliGRAM(s) Oral daily  tiotropium 18 MICROgram(s) Capsule 1 Capsule(s) Inhalation daily  tiZANidine 4 milliGRAM(s) Oral <User Schedule>  vancomycin  IVPB 750 milliGRAM(s) IV Intermittent daily  zinc sulfate 220 milliGRAM(s) Oral daily        RADIOLOGY:  < from: CT Chest No Cont (07.01.19 @ 19:34) >    MPRESSION:     Impacted left sided airways with collapse of the lingula and left lower   lobe. Underlying pneumonia is not excluded.    Large sacral decubitus ulceration with concern for sacral osteomyelitis.    < end of copied text >        PHYSICAL EXAM:    Constitutional: AOx0. NAD, non-toxic appearance, contracted  Neuro: PERRLA, grossly intact, no focal deficits  Respiratory: clear lungs bilaterally. No wheezing, rhonchi, or crackles. non-labored  Cardiovascular: S1 S2. No murmurs.  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  thinks she must have pulled it out this morning. He says otherwise she was in her usual state of health at home. He says she has had some intermittent low grade fevers at home, which usually respond to Tylenol.   He says her Blackamn was due to be changed early July 2019.   Last dose of Eliquis was 6/23/19 8pm.   In ED patient received 1LNS and cefepime. (24 Jun 2019 15:39)      1. Neutropenic fever       - Tylenol 1g IV and cooling measures PRN for pyrexia       - f/u BC, UC on ; awaiting for results       - CXR on        - c/w current antimicrobial regimen       - c/w IV hydration with vitals check Q4hrs         - ***** c/w supportive care       - Continue close monitoring of clinical status and vital signs. HD stable.        - will Endorse to primary team in AM      Rosamaria Stiles PA-C MEDICINE PA    Notified by RN patient with temperature of 101.2'F. Seen and examined patient at bedside with presence of daughter. Unable to obtain ROS due to patient's mental status. Per daughter, patient is not different from her baseline, no acute changes.      VITAL SIGNS:  T(C): 38.4 (07-04-19 @ 20:30), Max: 38.4 (07-04-19 @ 20:30)  HR: 111 (07-04-19 @ 20:30) (89 - 111)  BP: 122/77 (07-04-19 @ 20:30) (111/66 - 122/77)  RR: 20 (07-04-19 @ 20:30) (18 - 20)  SpO2: 96% (07-04-19 @ 20:30) (96% - 100%)  Wt(kg): --      LABORATORY:                          7.5    11.51 )-----------( 409      ( 04 Jul 2019 13:00 )             23.8       07-04    135  |  100  |  54<H>  ----------------------------<  143<H>  4.8   |  21<L>  |  1.12    Ca    9.5      04 Jul 2019 11:07  Mg     1.8     07-04      MICROBIOLOGY:   Culture - Blood (07.01.19 @ 22:05)    Specimen Source: .Blood    Culture Results:   No growth to date.      RADIOLOGY:  < from: CT Chest No Cont (07.01.19 @ 19:34) >    MPRESSION:     Impacted left sided airways with collapse of the lingula and left lower   lobe. Underlying pneumonia is not excluded.    Large sacral decubitus ulceration with concern for sacral osteomyelitis.    < end of copied text >        PHYSICAL EXAM:  Constitutional: AOx0. NAD, non-toxic appearance, contracted  Neuro: PERRLA, grossly intact, no focal deficits, open her eyes with verbal stimuli   Respiratory: clear lungs bilaterally. No wheezing, rhonchi, or crackles. non-labored  Cardiovascular: S1 S2. No murmurs  Gastrointestinal: soft, ND/NT, +BS in all quadrants, +PEG C/D/I  Extremities/Vascular: +PP b/l LE, ++BLE edema, contracted upper and lower extremities, warm to touch    68 yo female with multiple medical problems, demented, bedbound and contracted, with collapsed lingula and LLL    ASSESSMENT/PLAN:   HPI:  69 year old female with multiple prolonged hospitalizations, PMH of Advanced dementia (nonverbal, dysphagia, with PEG tube, functional quadriplegic, chronic Blackman catheter) sacral state 4 pressure ulcer, heel pressure ulcers, asthma on chronic prednisone, HLD, CVA, severe lordosis/kyphoscoliosis, 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 Now with recurrent fever.    # Fever       - Tylenol 500mg IV and cooling measures PRN for pyrexia       - f/u BC x 2, UA ordered       - CT chest on 7/1 shows collapse of the lingula and left lower lobe, cannot r/o PNA. Large sacral decubitus ulceration with concern for sacral OM       - c/w current antimicrobial regimen         - leukocytosis improving        - ID on board, follow ID recs         - Continue close monitoring of clinical status and vital signs. HD stable.        - will endorse to primary team in AM    Rosamaria Stiles PA-C

## 2019-07-05 LAB
ANION GAP SERPL CALC-SCNC: 13 MMOL/L — SIGNIFICANT CHANGE UP (ref 5–17)
APPEARANCE UR: ABNORMAL
BILIRUB UR-MCNC: NEGATIVE — SIGNIFICANT CHANGE UP
BUN SERPL-MCNC: 60 MG/DL — HIGH (ref 7–23)
CALCIUM SERPL-MCNC: 9.6 MG/DL — SIGNIFICANT CHANGE UP (ref 8.4–10.5)
CHLORIDE SERPL-SCNC: 99 MMOL/L — SIGNIFICANT CHANGE UP (ref 96–108)
CO2 SERPL-SCNC: 18 MMOL/L — LOW (ref 22–31)
COLOR SPEC: YELLOW — SIGNIFICANT CHANGE UP
CREAT SERPL-MCNC: 1.27 MG/DL — SIGNIFICANT CHANGE UP (ref 0.5–1.3)
DIFF PNL FLD: ABNORMAL
GAS PNL BLDA: SIGNIFICANT CHANGE UP
GAS PNL BLDA: SIGNIFICANT CHANGE UP
GLUCOSE BLDC GLUCOMTR-MCNC: 133 MG/DL — HIGH (ref 70–99)
GLUCOSE BLDC GLUCOMTR-MCNC: 145 MG/DL — HIGH (ref 70–99)
GLUCOSE BLDC GLUCOMTR-MCNC: 151 MG/DL — HIGH (ref 70–99)
GLUCOSE BLDC GLUCOMTR-MCNC: 166 MG/DL — HIGH (ref 70–99)
GLUCOSE SERPL-MCNC: 113 MG/DL — HIGH (ref 70–99)
GLUCOSE UR QL: NEGATIVE — SIGNIFICANT CHANGE UP
HCT VFR BLD CALC: 26.1 % — LOW (ref 34.5–45)
HGB BLD-MCNC: 8 G/DL — LOW (ref 11.5–15.5)
KETONES UR-MCNC: NEGATIVE — SIGNIFICANT CHANGE UP
LEUKOCYTE ESTERASE UR-ACNC: ABNORMAL
MCHC RBC-ENTMCNC: 29.1 PG — SIGNIFICANT CHANGE UP (ref 27–34)
MCHC RBC-ENTMCNC: 30.7 GM/DL — LOW (ref 32–36)
MCV RBC AUTO: 94.9 FL — SIGNIFICANT CHANGE UP (ref 80–100)
NITRITE UR-MCNC: NEGATIVE — SIGNIFICANT CHANGE UP
PH UR: 6 — SIGNIFICANT CHANGE UP (ref 5–8)
PLATELET # BLD AUTO: 344 K/UL — SIGNIFICANT CHANGE UP (ref 150–400)
POTASSIUM SERPL-MCNC: 5.1 MMOL/L — SIGNIFICANT CHANGE UP (ref 3.5–5.3)
POTASSIUM SERPL-SCNC: 5.1 MMOL/L — SIGNIFICANT CHANGE UP (ref 3.5–5.3)
PROT UR-MCNC: ABNORMAL
RBC # BLD: 2.75 M/UL — LOW (ref 3.8–5.2)
RBC # FLD: 15.2 % — HIGH (ref 10.3–14.5)
SODIUM SERPL-SCNC: 130 MMOL/L — LOW (ref 135–145)
SP GR SPEC: 1.01 — SIGNIFICANT CHANGE UP (ref 1.01–1.02)
UROBILINOGEN FLD QL: SIGNIFICANT CHANGE UP
WBC # BLD: 8.66 K/UL — SIGNIFICANT CHANGE UP (ref 3.8–10.5)
WBC # FLD AUTO: 8.66 K/UL — SIGNIFICANT CHANGE UP (ref 3.8–10.5)

## 2019-07-05 PROCEDURE — 99232 SBSQ HOSP IP/OBS MODERATE 35: CPT

## 2019-07-05 RX ORDER — IPRATROPIUM/ALBUTEROL SULFATE 18-103MCG
3 AEROSOL WITH ADAPTER (GRAM) INHALATION ONCE
Refills: 0 | Status: COMPLETED | OUTPATIENT
Start: 2019-07-05 | End: 2019-07-05

## 2019-07-05 RX ORDER — ACETAMINOPHEN 500 MG
1000 TABLET ORAL ONCE
Refills: 0 | Status: COMPLETED | OUTPATIENT
Start: 2019-07-05 | End: 2019-07-05

## 2019-07-05 RX ORDER — IPRATROPIUM/ALBUTEROL SULFATE 18-103MCG
3 AEROSOL WITH ADAPTER (GRAM) INHALATION EVERY 6 HOURS
Refills: 0 | Status: DISCONTINUED | OUTPATIENT
Start: 2019-07-05 | End: 2019-07-09

## 2019-07-05 RX ORDER — TIOTROPIUM BROMIDE 18 UG/1
1 CAPSULE ORAL; RESPIRATORY (INHALATION) DAILY
Refills: 0 | Status: DISCONTINUED | OUTPATIENT
Start: 2019-07-05 | End: 2019-07-09

## 2019-07-05 RX ORDER — ALBUTEROL 90 UG/1
1 AEROSOL, METERED ORAL EVERY 4 HOURS
Refills: 0 | Status: DISCONTINUED | OUTPATIENT
Start: 2019-07-05 | End: 2019-07-09

## 2019-07-05 RX ADMIN — MIDODRINE HYDROCHLORIDE 5 MILLIGRAM(S): 2.5 TABLET ORAL at 17:27

## 2019-07-05 RX ADMIN — GABAPENTIN 300 MILLIGRAM(S): 400 CAPSULE ORAL at 09:22

## 2019-07-05 RX ADMIN — Medication 300 MILLIGRAM(S): at 13:03

## 2019-07-05 RX ADMIN — QUETIAPINE FUMARATE 25 MILLIGRAM(S): 200 TABLET, FILM COATED ORAL at 21:08

## 2019-07-05 RX ADMIN — Medication 1: at 07:21

## 2019-07-05 RX ADMIN — HEPARIN SODIUM 5000 UNIT(S): 5000 INJECTION INTRAVENOUS; SUBCUTANEOUS at 05:04

## 2019-07-05 RX ADMIN — GABAPENTIN 300 MILLIGRAM(S): 400 CAPSULE ORAL at 22:52

## 2019-07-05 RX ADMIN — Medication 500 MILLIGRAM(S): at 13:01

## 2019-07-05 RX ADMIN — Medication 3 MILLILITER(S): at 22:31

## 2019-07-05 RX ADMIN — MIDODRINE HYDROCHLORIDE 5 MILLIGRAM(S): 2.5 TABLET ORAL at 22:51

## 2019-07-05 RX ADMIN — CHLORHEXIDINE GLUCONATE 1 APPLICATION(S): 213 SOLUTION TOPICAL at 17:21

## 2019-07-05 RX ADMIN — Medication 100 MILLIGRAM(S): at 17:21

## 2019-07-05 RX ADMIN — Medication 3 MILLILITER(S): at 13:00

## 2019-07-05 RX ADMIN — ERGOCALCIFEROL 5000 UNIT(S): 1.25 CAPSULE ORAL at 17:27

## 2019-07-05 RX ADMIN — TIZANIDINE 4 MILLIGRAM(S): 4 TABLET ORAL at 21:07

## 2019-07-05 RX ADMIN — Medication 250 MILLIGRAM(S): at 10:37

## 2019-07-05 RX ADMIN — Medication 3 MILLILITER(S): at 19:33

## 2019-07-05 RX ADMIN — Medication 1: at 00:58

## 2019-07-05 RX ADMIN — TIOTROPIUM BROMIDE 1 CAPSULE(S): 18 CAPSULE ORAL; RESPIRATORY (INHALATION) at 13:02

## 2019-07-05 RX ADMIN — Medication 1.5 MILLIGRAM(S): at 21:04

## 2019-07-05 RX ADMIN — Medication 3 MILLILITER(S): at 05:04

## 2019-07-05 RX ADMIN — CEFEPIME 100 MILLIGRAM(S): 1 INJECTION, POWDER, FOR SOLUTION INTRAMUSCULAR; INTRAVENOUS at 21:02

## 2019-07-05 RX ADMIN — HEPARIN SODIUM 5000 UNIT(S): 5000 INJECTION INTRAVENOUS; SUBCUTANEOUS at 17:24

## 2019-07-05 RX ADMIN — Medication 25 MILLIGRAM(S): at 09:22

## 2019-07-05 RX ADMIN — Medication 650 MILLIGRAM(S): at 22:59

## 2019-07-05 RX ADMIN — FAMOTIDINE 100 MILLIGRAM(S): 10 INJECTION INTRAVENOUS at 17:23

## 2019-07-05 RX ADMIN — Medication 100 MILLIGRAM(S): at 03:23

## 2019-07-05 RX ADMIN — ZINC SULFATE TAB 220 MG (50 MG ZINC EQUIVALENT) 220 MILLIGRAM(S): 220 (50 ZN) TAB at 13:02

## 2019-07-05 RX ADMIN — Medication 3 MILLILITER(S): at 17:24

## 2019-07-05 RX ADMIN — TIZANIDINE 4 MILLIGRAM(S): 4 TABLET ORAL at 09:22

## 2019-07-05 RX ADMIN — Medication 3 MILLILITER(S): at 00:57

## 2019-07-05 RX ADMIN — CEFEPIME 100 MILLIGRAM(S): 1 INJECTION, POWDER, FOR SOLUTION INTRAMUSCULAR; INTRAVENOUS at 17:22

## 2019-07-05 RX ADMIN — MIDODRINE HYDROCHLORIDE 5 MILLIGRAM(S): 2.5 TABLET ORAL at 05:04

## 2019-07-05 RX ADMIN — CEFEPIME 100 MILLIGRAM(S): 1 INJECTION, POWDER, FOR SOLUTION INTRAMUSCULAR; INTRAVENOUS at 04:44

## 2019-07-05 RX ADMIN — BUDESONIDE AND FORMOTEROL FUMARATE DIHYDRATE 2 PUFF(S): 160; 4.5 AEROSOL RESPIRATORY (INHALATION) at 17:24

## 2019-07-05 RX ADMIN — Medication 15 MILLILITER(S): at 13:03

## 2019-07-05 NOTE — PROGRESS NOTE ADULT - COMMENTS
pt nonverbal, gives no hx, cannot get ROS

## 2019-07-05 NOTE — PROGRESS NOTE ADULT - SUBJECTIVE AND OBJECTIVE BOX
---___---___---___---___---___---___ ---___---___---___---___---___---___---___---___---                  M E D I C A L   A T T E N D I N G   P R O G R E S S   N O T E  ---___---___---___---___---___---___ ---___---___---___---___---___---___---___---___---        ================================================    ++CHIEF COMPLAINT:   Patient is a 69y old  Female who presents with a chief complaint of PEG tube dislodgement, fever, leukocytosis (2019 10:49)      still febrile despite abx and cultures negative     ---___---___---___---___---___---  PAST MEDICAL / Surgical  HISTORY:  PAST MEDICAL & SURGICAL HISTORY:  Type 2 diabetes mellitus  Dementia  DVT, lower extremity  CVA (cerebral vascular accident)  Fatty pancreas  PNA (pneumonia)  Pulmonary HTN  IGT (impaired glucose tolerance)  Ulcerative colitis  Acid reflux  Anxiety  Depression  Mouth sores  HLD (hyperlipidemia)  Asthma  S/P percutaneous endoscopic gastrostomy (PEG) tube placement: for dysphagia  Humeral head fracture  H/O: hysterectomy  H/O cataract extraction, left  History of knee replacement      ---___---___---___---___---___---  FAMILY HISTORY:   FAMILY HISTORY:  Family history of dementia (Grandparent)  Family history of colon cancer (Grandparent)        ---___---___---___---___---___---  ALLERGIES:   Allergies    ASA; dye contrast (Anaphylaxis)  aspirin (Short breath)  divalproex sodium (Other (Unknown))  Flowers and Plants (Short breath)  Haldol (Other (Unknown))  penicillin (Short breath; Rash)  sulfa drugs (Short breath; Rash)  vancomycin (Rash; Urticaria; Hives)  Xanax (Other (Unknown))    Intolerances        ---___---___---___---___---___---  MEDICATIONS:  MEDICATIONS  (STANDING):  ALBUTerol/ipratropium for Nebulization. 3 milliLiter(s) Nebulizer four times a day  ascorbic acid 500 milliGRAM(s) Oral daily  buDESOnide  80 MICROgram(s)/formoterol 4.5 MICROgram(s) Inhaler 2 Puff(s) Inhalation two times a day  cefepime   IVPB 1000 milliGRAM(s) IV Intermittent every 8 hours  chlorhexidine 2% Cloths 1 Application(s) Topical daily  clonazePAM  Tablet 1.5 milliGRAM(s) Oral at bedtime  dextrose 5% + sodium chloride 0.9%. 1000 milliLiter(s) (50 mL/Hr) IV Continuous <Continuous>  dextrose 5%. 1000 milliLiter(s) (50 mL/Hr) IV Continuous <Continuous>  dextrose 50% Injectable 12.5 Gram(s) IV Push once  dextrose 50% Injectable 25 Gram(s) IV Push once  dextrose 50% Injectable 25 Gram(s) IV Push once  diphenhydrAMINE   Injectable 25 milliGRAM(s) IV Push two times a day  ergocalciferol Drops 5000 Unit(s) Oral daily  famotidine  IVPB 20 milliGRAM(s) IV Intermittent daily  ferrous    sulfate Liquid 300 milliGRAM(s) Enteral Tube daily  gabapentin 300 milliGRAM(s) Oral two times a day  heparin  Injectable 5000 Unit(s) SubCutaneous every 12 hours  insulin lispro (HumaLOG) corrective regimen sliding scale   SubCutaneous every 6 hours  Medical Marijuana Awake Oil 2 milliLiter(s) 2 milliLiter(s) Enteral Tube <User Schedule>  Medical Marijuana Calm Oil 2 milliLiter(s) 2 milliLiter(s) Enteral Tube <User Schedule>  Medical Marijuana [place] Oil 2 milliLiter(s) 2 milliLiter(s) Enteral Tube <User Schedule>  metroNIDAZOLE  IVPB 500 milliGRAM(s) IV Intermittent every 12 hours  midodrine 5 milliGRAM(s) Oral every 8 hours  multivitamin/minerals/iron Oral Solution (CENTRUM) 15 milliLiter(s) Oral daily  QUEtiapine 25 milliGRAM(s) Oral daily  tiotropium 18 MICROgram(s) Capsule 1 Capsule(s) Inhalation daily  tiZANidine 4 milliGRAM(s) Oral <User Schedule>  vancomycin  IVPB 750 milliGRAM(s) IV Intermittent daily  zinc sulfate 220 milliGRAM(s) Oral daily    MEDICATIONS  (PRN):  acetaminophen  Suppository .. 650 milliGRAM(s) Rectal every 6 hours PRN Temp greater or equal to 38C (100.4F), Mild Pain (1 - 3)  carboxymethylcellulose 0.5% (Preservative-Free) Ophthalmic Solution 1 Drop(s) Both EYES three times a day PRN dry eyes  dextrose 40% Gel 15 Gram(s) Oral once PRN Blood Glucose LESS THAN 70 milliGRAM(s)/deciliter  glucagon  Injectable 1 milliGRAM(s) IntraMuscular once PRN Glucose LESS THAN 70 milligrams/deciliter  nystatin Powder 1 Application(s) Topical three times a day PRN under breasts      ---___---___---___---___---___---  REVIEW OF SYSTEM:  unable to obtain   ---___---___---___---___---___---  VITAL SIGNS:  69y , CAPILLARY BLOOD GLUCOSE      POCT Blood Glucose.: 166 mg/dL (2019 06:28)    T(C): 36.3 (19 @ 13:37), Max: 38.4 (19 @ 20:30)  HR: 78 (19 @ 13:37) (78 - 111)  BP: 117/78 (19 @ 13:37) (96/61 - 122/77)  RR: 19 (19 @ 13:37) (18 - 20)  SpO2: 100% (19 @ 13:37) (96% - 100%)  ---___---___---___---___---___---  PHYSICAL EXAM:    GEN: A&O X 0 , NAD ,    HEENT: NCAT, PERRL, MMM, hearing intact  Neck: supple , no JVD  CVS: S1S2 , regular , No M/R/G appreciated  PULM: CTA B/L,  no W/R/R appreciated    ABD.: soft. non tender, non distended,  bowel sounds present peg noted   Extrem: intact pulses , (+) edema  contractures noted  Derm: No rash , no ecchymoses stage 4 sacral ducubitus  PSYCH : normal mood,  no delusion not anxious     ---___---___---___---___---___---            LAB AND IMAGIN.5    11.51 )-----------( 409      ( 2019 13:00 )             23.8               07-05    130<L>  |  99  |  60<H>  ----------------------------<  113<H>  5.1   |  18<L>  |  1.27    Ca    9.6      2019 11:01  Mg     1.8     07-04                              Urinalysis Basic - ( 2019 08:30 )    Color: Yellow / Appearance: Turbid / S.011 / pH: x  Gluc: x / Ketone: Negative  / Bili: Negative / Urobili: <2 mg/dL   Blood: x / Protein: 100 mg/dL / Nitrite: Negative   Leuk Esterase: Large / RBC: 27 /HPF / WBC >720 /HPF   Sq Epi: x / Non Sq Epi: 3 /HPF / Bacteria: Negative        [All pertinent / recent Imaging reviewed]         ---___---___---___---___---___---___ ---___---___---___---___---                         A S S E S S M E N T   A N D   P L A N :      HEALTH ISSUES - PROBLEM Dx:   Fever appreciate input form ID   consider nuclear scan if fevers persist   otherwise continue current regimen    functional quadraplegia continue current supportive care    agitatoion on seroquel and klonopin      dm2 seconndary to drugs ( budesonide) continue insulin in house    [facility] for chronic pain     history of chf bp low oin hold for bp meds      Overall all Long term Prognosis Poor                               -GI/DVT Prophylaxis.    --------------------------------------------  Case discussed with   Education given on   ___________________________  Thank you,  Deniz Bolden  [phone]

## 2019-07-05 NOTE — PROGRESS NOTE ADULT - SUBJECTIVE AND OBJECTIVE BOX
MYRIAM WHITE:[id number],   69yFemale followed for:  ASA; dye contrast (Anaphylaxis)  aspirin (Short breath)  divalproex sodium (Other (Unknown))  Flowers and Plants (Short breath)  Haldol (Other (Unknown))  penicillin (Short breath; Rash)  sulfa drugs (Short breath; Rash)  vancomycin (Rash; Urticaria; Hives)  Xanax (Other (Unknown))    PAST MEDICAL & SURGICAL HISTORY:  Type 2 diabetes mellitus  Dementia  DVT, lower extremity  CVA (cerebral vascular accident)  Fatty pancreas  PNA (pneumonia)  Pulmonary HTN  IGT (impaired glucose tolerance)  Ulcerative colitis  Acid reflux  Anxiety  Depression  Mouth sores  HLD (hyperlipidemia)  Asthma  S/P percutaneous endoscopic gastrostomy (PEG) tube placement: for dysphagia  Humeral head fracture  H/O: hysterectomy  H/O cataract extraction, left  History of knee replacement    FAMILY HISTORY:  Family history of dementia (Grandparent)  Family history of colon cancer (Grandparent)    MEDICATIONS  (STANDING):  ALBUTerol/ipratropium for Nebulization. 3 milliLiter(s) Nebulizer four times a day  ascorbic acid 500 milliGRAM(s) Oral daily  buDESOnide  80 MICROgram(s)/formoterol 4.5 MICROgram(s) Inhaler 2 Puff(s) Inhalation two times a day  cefepime   IVPB 1000 milliGRAM(s) IV Intermittent every 8 hours  chlorhexidine 2% Cloths 1 Application(s) Topical daily  clonazePAM  Tablet 1.5 milliGRAM(s) Oral at bedtime  dextrose 5% + sodium chloride 0.9%. 1000 milliLiter(s) (50 mL/Hr) IV Continuous <Continuous>  dextrose 5%. 1000 milliLiter(s) (50 mL/Hr) IV Continuous <Continuous>  dextrose 50% Injectable 12.5 Gram(s) IV Push once  dextrose 50% Injectable 25 Gram(s) IV Push once  dextrose 50% Injectable 25 Gram(s) IV Push once  diphenhydrAMINE   Injectable 25 milliGRAM(s) IV Push two times a day  ergocalciferol Drops 5000 Unit(s) Oral daily  famotidine  IVPB 20 milliGRAM(s) IV Intermittent daily  ferrous    sulfate Liquid 300 milliGRAM(s) Enteral Tube daily  gabapentin 300 milliGRAM(s) Oral two times a day  heparin  Injectable 5000 Unit(s) SubCutaneous every 12 hours  insulin lispro (HumaLOG) corrective regimen sliding scale   SubCutaneous every 6 hours  Medical Marijuana Awake Oil 2 milliLiter(s) 2 milliLiter(s) Enteral Tube <User Schedule>  Medical Marijuana Calm Oil 2 milliLiter(s) 2 milliLiter(s) Enteral Tube <User Schedule>  Medical Marijuana [place] Oil 2 milliLiter(s) 2 milliLiter(s) Enteral Tube <User Schedule>  metroNIDAZOLE  IVPB 500 milliGRAM(s) IV Intermittent every 12 hours  midodrine 5 milliGRAM(s) Oral every 8 hours  multivitamin/minerals/iron Oral Solution (CENTRUM) 15 milliLiter(s) Oral daily  QUEtiapine 25 milliGRAM(s) Oral daily  tiotropium 18 MICROgram(s) Capsule 1 Capsule(s) Inhalation daily  tiZANidine 4 milliGRAM(s) Oral <User Schedule>  vancomycin  IVPB 750 milliGRAM(s) IV Intermittent daily  zinc sulfate 220 milliGRAM(s) Oral daily    MEDICATIONS  (PRN):  acetaminophen  Suppository .. 650 milliGRAM(s) Rectal every 6 hours PRN Temp greater or equal to 38C (100.4F), Mild Pain (1 - 3)  carboxymethylcellulose 0.5% (Preservative-Free) Ophthalmic Solution 1 Drop(s) Both EYES three times a day PRN dry eyes  dextrose 40% Gel 15 Gram(s) Oral once PRN Blood Glucose LESS THAN 70 milliGRAM(s)/deciliter  glucagon  Injectable 1 milliGRAM(s) IntraMuscular once PRN Glucose LESS THAN 70 milligrams/deciliter  nystatin Powder 1 Application(s) Topical three times a day PRN under breasts      Vital Signs Last 24 Hrs  T(C): 36.7 (05 Jul 2019 06:30), Max: 38.4 (04 Jul 2019 20:30)  T(F): 98 (05 Jul 2019 06:30), Max: 101.2 (04 Jul 2019 20:30)  HR: 90 (05 Jul 2019 06:30) (85 - 111)  BP: 110/61 (05 Jul 2019 06:30) (96/61 - 122/77)  BP(mean): --  RR: 19 (05 Jul 2019 06:30) (18 - 20)  SpO2: 100% (05 Jul 2019 06:30) (96% - 100%)  nc/at  s1s2  cta  soft, nt, nd no guarding or rebound  no c/c/e    CBC Full  -  ( 04 Jul 2019 13:00 )  WBC Count : 11.51 K/uL  RBC Count : 2.59 M/uL  Hemoglobin : 7.5 g/dL  Hematocrit : 23.8 %  Platelet Count - Automated : 409 K/uL  Mean Cell Volume : 91.9 fl  Mean Cell Hemoglobin : 29.0 pg  Mean Cell Hemoglobin Concentration : 31.5 gm/dL  Auto Neutrophil # : 8.57 K/uL  Auto Lymphocyte # : 0.81 K/uL  Auto Monocyte # : 1.42 K/uL  Auto Eosinophil # : 0.56 K/uL  Auto Basophil # : 0.06 K/uL  Auto Neutrophil % : 74.5 %  Auto Lymphocyte % : 7.0 %  Auto Monocyte % : 12.3 %  Auto Eosinophil % : 4.9 %  Auto Basophil % : 0.5 %    07-04    135  |  100  |  54<H>  ----------------------------<  143<H>  4.8   |  21<L>  |  1.12    Ca    9.5      04 Jul 2019 11:07  Mg     1.8     07-04

## 2019-07-05 NOTE — PROGRESS NOTE ADULT - ASSESSMENT
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  Fever and leukocytosis now. Abnormal CT chest.

## 2019-07-05 NOTE — PROGRESS NOTE ADULT - SUBJECTIVE AND OBJECTIVE BOX
PULMONARY PROGRESS NOTE    MYRIAM HEATH  [id number]    Patient is a 69y old  Female who presents with a chief complaint of PEG tube dislodgement, fever, leukocytosis (05 Jul 2019 14:31)      HPI:  febrile overnight    ROS:   -    ACTIVE MEDICATION LIST:  MEDICATIONS  (STANDING):  ALBUTerol/ipratropium for Nebulization. 3 milliLiter(s) Nebulizer four times a day  ascorbic acid 500 milliGRAM(s) Oral daily  buDESOnide  80 MICROgram(s)/formoterol 4.5 MICROgram(s) Inhaler 2 Puff(s) Inhalation two times a day  cefepime   IVPB 1000 milliGRAM(s) IV Intermittent every 8 hours  chlorhexidine 2% Cloths 1 Application(s) Topical daily  clonazePAM  Tablet 1.5 milliGRAM(s) Oral at bedtime  dextrose 5% + sodium chloride 0.9%. 1000 milliLiter(s) (50 mL/Hr) IV Continuous <Continuous>  dextrose 5%. 1000 milliLiter(s) (50 mL/Hr) IV Continuous <Continuous>  dextrose 50% Injectable 12.5 Gram(s) IV Push once  dextrose 50% Injectable 25 Gram(s) IV Push once  dextrose 50% Injectable 25 Gram(s) IV Push once  diphenhydrAMINE   Injectable 25 milliGRAM(s) IV Push two times a day  ergocalciferol Drops 5000 Unit(s) Oral daily  famotidine  IVPB 20 milliGRAM(s) IV Intermittent daily  ferrous    sulfate Liquid 300 milliGRAM(s) Enteral Tube daily  gabapentin 300 milliGRAM(s) Oral two times a day  heparin  Injectable 5000 Unit(s) SubCutaneous every 12 hours  insulin lispro (HumaLOG) corrective regimen sliding scale   SubCutaneous every 6 hours  Medical Marijuana Awake Oil 2 milliLiter(s) 2 milliLiter(s) Enteral Tube <User Schedule>  Medical Marijuana Calm Oil 2 milliLiter(s) 2 milliLiter(s) Enteral Tube <User Schedule>  Medical Marijuana [place] Oil 2 milliLiter(s) 2 milliLiter(s) Enteral Tube <User Schedule>  metroNIDAZOLE  IVPB 500 milliGRAM(s) IV Intermittent every 12 hours  midodrine 5 milliGRAM(s) Oral every 8 hours  multivitamin/minerals/iron Oral Solution (CENTRUM) 15 milliLiter(s) Oral daily  QUEtiapine 25 milliGRAM(s) Oral daily  tiotropium 18 MICROgram(s) Capsule 1 Capsule(s) Inhalation daily  tiZANidine 4 milliGRAM(s) Oral <User Schedule>  vancomycin  IVPB 750 milliGRAM(s) IV Intermittent daily  zinc sulfate 220 milliGRAM(s) Oral daily    MEDICATIONS  (PRN):  acetaminophen  Suppository .. 650 milliGRAM(s) Rectal every 6 hours PRN Temp greater or equal to 38C (100.4F), Mild Pain (1 - 3)  carboxymethylcellulose 0.5% (Preservative-Free) Ophthalmic Solution 1 Drop(s) Both EYES three times a day PRN dry eyes  dextrose 40% Gel 15 Gram(s) Oral once PRN Blood Glucose LESS THAN 70 milliGRAM(s)/deciliter  glucagon  Injectable 1 milliGRAM(s) IntraMuscular once PRN Glucose LESS THAN 70 milligrams/deciliter  nystatin Powder 1 Application(s) Topical three times a day PRN under breasts      EXAM:  Vital Signs Last 24 Hrs  T(C): 36.3 (05 Jul 2019 13:37), Max: 38.4 (04 Jul 2019 20:30)  T(F): 97.3 (05 Jul 2019 13:37), Max: 101.2 (04 Jul 2019 20:30)  HR: 78 (05 Jul 2019 13:37) (78 - 111)  BP: 117/78 (05 Jul 2019 13:37) (96/61 - 122/77)  BP(mean): --  RR: 19 (05 Jul 2019 13:37) (18 - 20)  SpO2: 100% (05 Jul 2019 13:37) (96% - 100%)    GENERAL: The patient is awake  n no apparent distress.     LUNGS:poor air entry; respirations unlabored    HEART: Regular rate and rhythm without murmur.                            7.5    11.51 )-----------( 409      ( 04 Jul 2019 13:00 )             23.8       07-05    130<L>  |  99  |  60<H>  ----------------------------<  113<H>  5.1   |  18<L>  |  1.27    Ca    9.6      05 Jul 2019 11:01  Mg     1.8     07-04          PROBLEM LIST:  69y Female with HEALTH ISSUES - PROBLEM Dx:  Fever: Fever  Type 2 diabetes mellitus without complication, without long-term current use of insulin:   Make sure you get your HgA1c checked every three months.  If you take oral diabetes medications, check your blood glucose two times a day.  If you take insulin, check your blood glucose before meals and at bedtime.  It&#x27;s important not to skip any meals.  Keep a log of your blood glucose results and always take it with you to your doctor appointments.  Keep a list of your current medications including injectables and over the counter medications and bring this medication list with you to all your doctor appointments.  If you have not seen your ophthalmologist this year call for appointment.  Check your feet daily for redness, sores, or openings. Do not self treat. If no improvement in two days call your primary care physician for an appointment.  Low blood sugar (hypoglycemia) is a blood sugar below 70mg/dl. Check your blood sugar if you feel signs/symptoms of hypoglycemia. If your blood sugar is below 70 take 15 grams of carbohydrates (ex 4 oz of apple juice, 3-4 glucose tablets, or 4-6 oz of regular soda) wait 15 minutes and repeat blood sugar to make sure it comes up above 70.  If your blood sugar is above 70 and you are due for a meal, have a meal.  If you are not due for a meal have a snack.  This snack helps keeps your blood sugar at a safe range.    Pressure injury of skin of sacral region, unspecified injury stage: Continue with wound care as instructed.   Maintain adequate nutrition, frequent repositioning , offloading with Zfloat boots.  Follow-up with your primary care physician and Wound Care Specialist within 1 week. Call for appointment.    Chronic deep vein thrombosis (DVT) of right lower extremity, unspecified vein: Take your &quot;blood thinners&quot; as prescribed.  Walking is encouraged, increase activity as tolerated.  If you develop new leg pain, swelling, and/or redness contact your healthcare provider.  If you develop new chest pain with difficulty breathing, a rapid heart rate and/or a feeling of passing out call emergency medical services 911.    Prophylactic measure: Prophylactic measure  Dementia without behavioral disturbance, unspecified dementia type: Continue with medications as prescribed by your doctor.  Maintain safe environment.  Maintain adequate nutrition, hydration.  Follow-up with your primary care physician within 1 week. Call for appointment.    Uncomplicated asthma, unspecified asthma severity, unspecified whether persistent: Stable.  Follow-up with your primary care physician within 1 week. Call for appointment.    Sepsis, due to unspecified organism: Take all antibiotics as ordered.  Call you Health care provider upon arrival home to make a one week follow up appointment.  If you develop fever, chills, malaise, or change in mental status call your Health Care Provider or go to the Emergency Department.  Nutrition is important, eat small frequent meals to help ensure you get adequate calories.  Do not stay in bed all day!  Increase your activity daily as tolerated.    PEG tube malfunction: PEG tube malfunction            RECS:   abx as per ID  aspiration precautions  chest PT/ vest for airway secretions clearance   neb around the clock  prognosis  poor        Please call with any questions.    Leigh Ann Resendez, DO  [facility] Pulmonary/Sleep Medicine  [phone]

## 2019-07-05 NOTE — PROGRESS NOTE ADULT - SUBJECTIVE AND OBJECTIVE BOX
MRYIAM HEATH 69y [id number]    Patient is a 69y old  Female who presents with a chief complaint of PEG tube dislodgement, fever, leukocytosis (03 Jul 2019 16:04)      Follow Up/CC:  ID following for fever    Interval History/ROS: fever+   at bedside and reports she is better today and more alert.  Wound are clean - d/w RN  Was able to get up copious secretions. Secretions less today.  Midline is new  Flagyl added 7/3/19      Allergies    ASA; dye contrast (Anaphylaxis)  aspirin (Short breath)  divalproex sodium (Other (Unknown))  Flowers and Plants (Short breath)  Haldol (Other (Unknown))  penicillin (Short breath; Rash)  sulfa drugs (Short breath; Rash)  vancomycin (Rash; Urticaria; Hives)  Xanax (Other (Unknown))    Intolerances        ANTIMICROBIALS:   cefepime   IVPB 1000 every 8 hours  metroNIDAZOLE  IVPB 500 every 12 hours  vancomycin  IVPB 750 daily    MEDICATIONS  (STANDING):  acetaminophen  Suppository .. 650 every 6 hours PRN  ALBUTerol/ipratropium for Nebulization. 3 four times a day  buDESOnide  80 MICROgram(s)/formoterol 4.5 MICROgram(s) Inhaler 2 two times a day  clonazePAM  Tablet 1.5 at bedtime  dextrose 40% Gel 15 once PRN  dextrose 50% Injectable 12.5 once  dextrose 50% Injectable 25 once  dextrose 50% Injectable 25 once  diphenhydrAMINE   Injectable 25 two times a day  famotidine  IVPB 20 daily  gabapentin 300 two times a day  glucagon  Injectable 1 once PRN  heparin  Injectable 5000 every 12 hours  insulin lispro (HumaLOG) corrective regimen sliding scale  every 6 hours  midodrine 5 every 8 hours  QUEtiapine 25 daily  tiotropium 18 MICROgram(s) Capsule 1 daily  tiZANidine 4 <User Schedule>        Vital Signs Last 24 Hrs  T(F): 98 (07-05-19 @ 06:30), Max: 101.2 (07-04-19 @ 20:30)  HR: 90 (07-05-19 @ 06:30)  BP: 110/61 (07-05-19 @ 06:30)  RR: 19 (07-05-19 @ 06:30)  SpO2: 100% (07-05-19 @ 06:30) (96% - 100%)  Wt(kg): --                          7.5    11.51 )-----------( 409      ( 04 Jul 2019 13:00 )             23.8 07-04    135  |  100  |  54  ----------------------------<  143  4.8   |  21  |  1.12  Ca    9.5      04 Jul 2019 11:07Mg     1.8     07-04      CBC Full  -  ( 03 Jul 2019 11:56 )  WBC Count : 13.03 K/uL  RBC Count : 2.83 M/uL  Hemoglobin : 8.2 g/dL  Hematocrit : 26.1 %  Platelet Count - Automated : 417 K/uL  Mean Cell Volume : 92.2 fl  Mean Cell Hemoglobin : 29.0 pg  Mean Cell Hemoglobin Concentration : 31.4 gm/dL  Auto Neutrophil # : x  Auto Lymphocyte # : x  Auto Monocyte # : x  Auto Eosinophil # : x  Auto Basophil # : x  Auto Neutrophil % : x  Auto Lymphocyte % : x  Auto Monocyte % : x  Auto Eosinophil % : x  Auto Basophil % : x      Vancomycin Level, Trough (07.03.19 @ 08:54)    Vancomycin Level, Trough: 23.4:             MICROBIOLOGY:  Culture - Blood (07.01.19 @ 22:05)    Specimen Source: .Blood    Culture Results:   No growth to date.      Culture - Blood (07.01.19 @ 22:05)    Specimen Source: .Blood    Culture Results:   No growth to date.        .Blood  06-28-19   No growth to date.  --  --      .Blood  06-26-19   No growth at 5 days.  --  --      .Blood  06-24-19   No growth at 5 days.  --  --      .Blood  06-18-19   No growth at 5 days.  --  --      .Blood  06-17-19   No growth at 5 days.  --  --      .Urine  06-13-19   >100,000 CFU/ml Klebsiella oxytoca  >100,000 CFU/ml Pseudomonas aeruginosa (Carbapenem Resistant)  --  Klebsiella oxytoca  Pseudomonas aeruginosa (Carbapenem Resistant)      .Blood  06-13-19   Growth in anaerobic bottle: Staphylococcus epidermidis  --  Staphylococcus epidermidis        Vancomycin Level, Trough: 23.4 ug/mL (07-03-19 @ 08:54)      RADIOLOGY    < from: CT chest/Abdomen and Pelvis w/ Oral Cont (07.01.19 @ 19:34) >  Impacted left sided airways with collapse of the lingula and left lower   lobe. Underlying pneumonia is not excluded.    Large sacral decubitus ulceration with concern for sacral osteomyelitis.

## 2019-07-05 NOTE — PROGRESS NOTE ADULT - PROBLEM SELECTOR PLAN 1
-still present, but seems less frequent.    -trend temps  -cont abx  -prob from aspiration/pna  -flagyl 500 mg iv q12 for anaerobic coverage  -consider WBC scan if fevers persist despite abx and negative cx  -lower vanco 750 mg iv q24  -overall prognosis poor  - please recheck vanco trough before dose tomorrow.

## 2019-07-06 LAB
ALBUMIN SERPL ELPH-MCNC: 1.9 G/DL — LOW (ref 3.3–5)
ALP SERPL-CCNC: 72 U/L — SIGNIFICANT CHANGE UP (ref 40–120)
ALT FLD-CCNC: 9 U/L — LOW (ref 10–45)
ANION GAP SERPL CALC-SCNC: 13 MMOL/L — SIGNIFICANT CHANGE UP (ref 5–17)
ANION GAP SERPL CALC-SCNC: 14 MMOL/L — SIGNIFICANT CHANGE UP (ref 5–17)
APPEARANCE UR: SIGNIFICANT CHANGE UP
AST SERPL-CCNC: 14 U/L — SIGNIFICANT CHANGE UP (ref 10–40)
BACTERIA # UR AUTO: ABNORMAL
BASOPHILS # BLD AUTO: 0 K/UL — SIGNIFICANT CHANGE UP (ref 0–0.2)
BASOPHILS # BLD AUTO: 0 K/UL — SIGNIFICANT CHANGE UP (ref 0–0.2)
BASOPHILS NFR BLD AUTO: 0.2 % — SIGNIFICANT CHANGE UP (ref 0–2)
BASOPHILS NFR BLD AUTO: 1 % — SIGNIFICANT CHANGE UP (ref 0–2)
BILIRUB SERPL-MCNC: 0.1 MG/DL — LOW (ref 0.2–1.2)
BILIRUB UR-MCNC: NEGATIVE — SIGNIFICANT CHANGE UP
BUN SERPL-MCNC: 63 MG/DL — HIGH (ref 7–23)
BUN SERPL-MCNC: 69 MG/DL — HIGH (ref 7–23)
CALCIUM SERPL-MCNC: 9.2 MG/DL — SIGNIFICANT CHANGE UP (ref 8.4–10.5)
CALCIUM SERPL-MCNC: 9.3 MG/DL — SIGNIFICANT CHANGE UP (ref 8.4–10.5)
CHLORIDE SERPL-SCNC: 94 MMOL/L — LOW (ref 96–108)
CHLORIDE SERPL-SCNC: 95 MMOL/L — LOW (ref 96–108)
CHLORIDE UR-SCNC: <35 MMOL/L — SIGNIFICANT CHANGE UP
CO2 SERPL-SCNC: 19 MMOL/L — LOW (ref 22–31)
CO2 SERPL-SCNC: 19 MMOL/L — LOW (ref 22–31)
COLOR SPEC: YELLOW — SIGNIFICANT CHANGE UP
COMMENT - URINE: SIGNIFICANT CHANGE UP
CREAT ?TM UR-MCNC: 20 MG/DL — SIGNIFICANT CHANGE UP
CREAT SERPL-MCNC: 1.4 MG/DL — HIGH (ref 0.5–1.3)
CREAT SERPL-MCNC: 1.51 MG/DL — HIGH (ref 0.5–1.3)
CULTURE RESULTS: SIGNIFICANT CHANGE UP
CULTURE RESULTS: SIGNIFICANT CHANGE UP
DACRYOCYTES BLD QL SMEAR: SLIGHT — SIGNIFICANT CHANGE UP
DIFF PNL FLD: ABNORMAL
ELLIPTOCYTES BLD QL SMEAR: SLIGHT — SIGNIFICANT CHANGE UP
EOSINOPHIL # BLD AUTO: 0.7 K/UL — HIGH (ref 0–0.5)
EOSINOPHIL # BLD AUTO: 0.8 K/UL — HIGH (ref 0–0.5)
EOSINOPHIL NFR BLD AUTO: 5 % — SIGNIFICANT CHANGE UP (ref 0–6)
EOSINOPHIL NFR BLD AUTO: 6.2 % — HIGH (ref 0–6)
EPI CELLS # UR: 0 — SIGNIFICANT CHANGE UP
GLUCOSE BLDC GLUCOMTR-MCNC: 101 MG/DL — HIGH (ref 70–99)
GLUCOSE BLDC GLUCOMTR-MCNC: 145 MG/DL — HIGH (ref 70–99)
GLUCOSE BLDC GLUCOMTR-MCNC: 150 MG/DL — HIGH (ref 70–99)
GLUCOSE SERPL-MCNC: 117 MG/DL — HIGH (ref 70–99)
GLUCOSE SERPL-MCNC: 143 MG/DL — HIGH (ref 70–99)
GLUCOSE UR QL: NEGATIVE — SIGNIFICANT CHANGE UP
HCT VFR BLD CALC: 23.8 % — LOW (ref 34.5–45)
HCT VFR BLD CALC: 24.5 % — LOW (ref 34.5–45)
HGB BLD-MCNC: 8.2 G/DL — LOW (ref 11.5–15.5)
HGB BLD-MCNC: 8.2 G/DL — LOW (ref 11.5–15.5)
HYALINE CASTS # UR AUTO: 0 /LPF — SIGNIFICANT CHANGE UP (ref 0–7)
HYPOCHROMIA BLD QL: SLIGHT — SIGNIFICANT CHANGE UP
KETONES UR-MCNC: SIGNIFICANT CHANGE UP
LEUKOCYTE ESTERASE UR-ACNC: ABNORMAL
LYMPHOCYTES # BLD AUTO: 0.6 K/UL — LOW (ref 1–3.3)
LYMPHOCYTES # BLD AUTO: 0.8 K/UL — LOW (ref 1–3.3)
LYMPHOCYTES # BLD AUTO: 4 % — LOW (ref 13–44)
LYMPHOCYTES # BLD AUTO: 6.2 % — LOW (ref 13–44)
MAGNESIUM SERPL-MCNC: 1.7 MG/DL — SIGNIFICANT CHANGE UP (ref 1.6–2.6)
MCHC RBC-ENTMCNC: 30.4 PG — SIGNIFICANT CHANGE UP (ref 27–34)
MCHC RBC-ENTMCNC: 31.1 PG — SIGNIFICANT CHANGE UP (ref 27–34)
MCHC RBC-ENTMCNC: 33.3 GM/DL — SIGNIFICANT CHANGE UP (ref 32–36)
MCHC RBC-ENTMCNC: 34.3 GM/DL — SIGNIFICANT CHANGE UP (ref 32–36)
MCV RBC AUTO: 90.6 FL — SIGNIFICANT CHANGE UP (ref 80–100)
MCV RBC AUTO: 91.3 FL — SIGNIFICANT CHANGE UP (ref 80–100)
MONOCYTES # BLD AUTO: 1 K/UL — HIGH (ref 0–0.9)
MONOCYTES # BLD AUTO: 1 K/UL — HIGH (ref 0–0.9)
MONOCYTES NFR BLD AUTO: 6 % — SIGNIFICANT CHANGE UP (ref 2–14)
MONOCYTES NFR BLD AUTO: 7 % — SIGNIFICANT CHANGE UP (ref 2–14)
NEUTROPHILS # BLD AUTO: 10.9 K/UL — HIGH (ref 1.8–7.4)
NEUTROPHILS # BLD AUTO: 13.9 K/UL — HIGH (ref 1.8–7.4)
NEUTROPHILS NFR BLD AUTO: 80 % — HIGH (ref 43–77)
NEUTROPHILS NFR BLD AUTO: 80.3 % — HIGH (ref 43–77)
NEUTS BAND # BLD: 3 % — SIGNIFICANT CHANGE UP (ref 0–8)
NITRITE UR-MCNC: NEGATIVE — SIGNIFICANT CHANGE UP
OSMOLALITY SERPL: 302 MOS/KG — HIGH (ref 275–300)
OSMOLALITY UR: 269 MOS/KG — LOW (ref 300–900)
PH UR: 6.5 — SIGNIFICANT CHANGE UP (ref 5–8)
PHOSPHATE SERPL-MCNC: 4.6 MG/DL — HIGH (ref 2.5–4.5)
PLAT MORPH BLD: NORMAL — SIGNIFICANT CHANGE UP
PLATELET # BLD AUTO: 416 K/UL — HIGH (ref 150–400)
PLATELET # BLD AUTO: 463 K/UL — HIGH (ref 150–400)
POIKILOCYTOSIS BLD QL AUTO: SLIGHT — SIGNIFICANT CHANGE UP
POTASSIUM SERPL-MCNC: 5 MMOL/L — SIGNIFICANT CHANGE UP (ref 3.5–5.3)
POTASSIUM SERPL-MCNC: 5.4 MMOL/L — HIGH (ref 3.5–5.3)
POTASSIUM SERPL-SCNC: 5 MMOL/L — SIGNIFICANT CHANGE UP (ref 3.5–5.3)
POTASSIUM SERPL-SCNC: 5.4 MMOL/L — HIGH (ref 3.5–5.3)
POTASSIUM UR-SCNC: 57 MMOL/L — SIGNIFICANT CHANGE UP
PROT SERPL-MCNC: 4.9 G/DL — LOW (ref 6–8.3)
PROT UR-MCNC: ABNORMAL
RBC # BLD: 2.63 M/UL — LOW (ref 3.8–5.2)
RBC # BLD: 2.69 M/UL — LOW (ref 3.8–5.2)
RBC # FLD: 14 % — SIGNIFICANT CHANGE UP (ref 10.3–14.5)
RBC # FLD: 14.2 % — SIGNIFICANT CHANGE UP (ref 10.3–14.5)
RBC BLD AUTO: SIGNIFICANT CHANGE UP
RBC CASTS # UR COMP ASSIST: 10 /HPF — HIGH (ref 0–4)
SODIUM SERPL-SCNC: 126 MMOL/L — LOW (ref 135–145)
SODIUM SERPL-SCNC: 128 MMOL/L — LOW (ref 135–145)
SODIUM UR-SCNC: <20 MMOL/L — SIGNIFICANT CHANGE UP
SP GR SPEC: 1.02 — SIGNIFICANT CHANGE UP (ref 1.01–1.02)
SPECIMEN SOURCE: SIGNIFICANT CHANGE UP
SPECIMEN SOURCE: SIGNIFICANT CHANGE UP
UROBILINOGEN FLD QL: NEGATIVE — SIGNIFICANT CHANGE UP
VARIANT LYMPHS # BLD: 1 % — SIGNIFICANT CHANGE UP (ref 0–6)
WBC # BLD: 13.6 K/UL — HIGH (ref 3.8–10.5)
WBC # BLD: 16.3 K/UL — HIGH (ref 3.8–10.5)
WBC # FLD AUTO: 13.6 K/UL — HIGH (ref 3.8–10.5)
WBC # FLD AUTO: 16.3 K/UL — HIGH (ref 3.8–10.5)
WBC UR QL: >50

## 2019-07-06 PROCEDURE — 93010 ELECTROCARDIOGRAM REPORT: CPT

## 2019-07-06 PROCEDURE — 71045 X-RAY EXAM CHEST 1 VIEW: CPT | Mod: 26

## 2019-07-06 PROCEDURE — 99233 SBSQ HOSP IP/OBS HIGH 50: CPT

## 2019-07-06 RX ORDER — ALBUTEROL 90 UG/1
2.5 AEROSOL, METERED ORAL ONCE
Refills: 0 | Status: COMPLETED | OUTPATIENT
Start: 2019-07-06 | End: 2019-07-06

## 2019-07-06 RX ORDER — SODIUM POLYSTYRENE SULFONATE 4.1 MEQ/G
15 POWDER, FOR SUSPENSION ORAL ONCE
Refills: 0 | Status: DISCONTINUED | OUTPATIENT
Start: 2019-07-06 | End: 2019-07-06

## 2019-07-06 RX ADMIN — Medication 100 MILLIGRAM(S): at 04:07

## 2019-07-06 RX ADMIN — QUETIAPINE FUMARATE 25 MILLIGRAM(S): 200 TABLET, FILM COATED ORAL at 23:00

## 2019-07-06 RX ADMIN — HEPARIN SODIUM 5000 UNIT(S): 5000 INJECTION INTRAVENOUS; SUBCUTANEOUS at 17:32

## 2019-07-06 RX ADMIN — MIDODRINE HYDROCHLORIDE 5 MILLIGRAM(S): 2.5 TABLET ORAL at 04:14

## 2019-07-06 RX ADMIN — MIDODRINE HYDROCHLORIDE 5 MILLIGRAM(S): 2.5 TABLET ORAL at 21:42

## 2019-07-06 RX ADMIN — Medication 300 MILLIGRAM(S): at 13:10

## 2019-07-06 RX ADMIN — FAMOTIDINE 100 MILLIGRAM(S): 10 INJECTION INTRAVENOUS at 13:10

## 2019-07-06 RX ADMIN — CEFEPIME 100 MILLIGRAM(S): 1 INJECTION, POWDER, FOR SOLUTION INTRAMUSCULAR; INTRAVENOUS at 04:10

## 2019-07-06 RX ADMIN — CEFEPIME 100 MILLIGRAM(S): 1 INJECTION, POWDER, FOR SOLUTION INTRAMUSCULAR; INTRAVENOUS at 13:11

## 2019-07-06 RX ADMIN — Medication 3 MILLILITER(S): at 17:31

## 2019-07-06 RX ADMIN — CEFEPIME 100 MILLIGRAM(S): 1 INJECTION, POWDER, FOR SOLUTION INTRAMUSCULAR; INTRAVENOUS at 21:41

## 2019-07-06 RX ADMIN — GABAPENTIN 300 MILLIGRAM(S): 400 CAPSULE ORAL at 21:42

## 2019-07-06 RX ADMIN — GABAPENTIN 300 MILLIGRAM(S): 400 CAPSULE ORAL at 10:32

## 2019-07-06 RX ADMIN — Medication 3 MILLILITER(S): at 13:10

## 2019-07-06 RX ADMIN — TIOTROPIUM BROMIDE 1 CAPSULE(S): 18 CAPSULE ORAL; RESPIRATORY (INHALATION) at 13:13

## 2019-07-06 RX ADMIN — TIZANIDINE 4 MILLIGRAM(S): 4 TABLET ORAL at 21:42

## 2019-07-06 RX ADMIN — ZINC SULFATE TAB 220 MG (50 MG ZINC EQUIVALENT) 220 MILLIGRAM(S): 220 (50 ZN) TAB at 13:13

## 2019-07-06 RX ADMIN — Medication 100 MILLIGRAM(S): at 17:30

## 2019-07-06 RX ADMIN — MIDODRINE HYDROCHLORIDE 5 MILLIGRAM(S): 2.5 TABLET ORAL at 13:13

## 2019-07-06 RX ADMIN — Medication 3 MILLILITER(S): at 04:23

## 2019-07-06 RX ADMIN — Medication 1.5 MILLIGRAM(S): at 23:00

## 2019-07-06 RX ADMIN — HEPARIN SODIUM 5000 UNIT(S): 5000 INJECTION INTRAVENOUS; SUBCUTANEOUS at 04:12

## 2019-07-06 RX ADMIN — TIZANIDINE 4 MILLIGRAM(S): 4 TABLET ORAL at 10:32

## 2019-07-06 RX ADMIN — ERGOCALCIFEROL 5000 UNIT(S): 1.25 CAPSULE ORAL at 13:10

## 2019-07-06 RX ADMIN — ALBUTEROL 2.5 MILLIGRAM(S): 90 AEROSOL, METERED ORAL at 18:27

## 2019-07-06 RX ADMIN — Medication 3 MILLILITER(S): at 23:01

## 2019-07-06 RX ADMIN — Medication 1000 MILLIGRAM(S): at 06:01

## 2019-07-06 RX ADMIN — CHLORHEXIDINE GLUCONATE 1 APPLICATION(S): 213 SOLUTION TOPICAL at 13:11

## 2019-07-06 RX ADMIN — Medication 400 MILLIGRAM(S): at 01:26

## 2019-07-06 RX ADMIN — Medication 500 MILLIGRAM(S): at 13:10

## 2019-07-06 RX ADMIN — Medication 15 MILLILITER(S): at 13:10

## 2019-07-06 RX ADMIN — BUDESONIDE AND FORMOTEROL FUMARATE DIHYDRATE 2 PUFF(S): 160; 4.5 AEROSOL RESPIRATORY (INHALATION) at 17:31

## 2019-07-06 RX ADMIN — Medication 3 MILLILITER(S): at 01:30

## 2019-07-06 RX ADMIN — Medication 250 MILLIGRAM(S): at 10:33

## 2019-07-06 RX ADMIN — Medication 25 MILLIGRAM(S): at 10:33

## 2019-07-06 RX ADMIN — BUDESONIDE AND FORMOTEROL FUMARATE DIHYDRATE 2 PUFF(S): 160; 4.5 AEROSOL RESPIRATORY (INHALATION) at 04:13

## 2019-07-06 NOTE — PROGRESS NOTE ADULT - ASSESSMENT
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  Fever and leukocytosis now. Abnormal CT chest.   RRT episode last night, patient found with hypoxia and AMS suspect from aspiration event.    Recommend:   -Trend temps  -Cont abx - vanco/ cefepime/ flagyl  -Monitor vanco trough  -Prob from aspiration/pna  -Check CXR  -Consider WBC scan if fevers persist despite abx and negative cx  -overall prognosis poor

## 2019-07-06 NOTE — PROGRESS NOTE ADULT - SUBJECTIVE AND OBJECTIVE BOX
CC: Patient is a 69y old  Female who presents with a chief complaint of PEG tube dislodgement, fever, leukocytosis (2019 15:27)    ID following for fever    Interval History/ROS: Patient had RRT last night for AMS and hypoxia, placed on Bipap. Now off bipap. Febrile to 101.2F last night. Leukocytosis. Patient unable to provide additional ROS due to being arousable at this time.    Allergies  ASA; dye contrast (Anaphylaxis)  aspirin (Short breath)  divalproex sodium (Other (Unknown))  Flowers and Plants (Short breath)  Haldol (Other (Unknown))  penicillin (Short breath; Rash)  sulfa drugs (Short breath; Rash)  vancomycin (Rash; Urticaria; Hives)  Xanax (Other (Unknown))    ANTIMICROBIALS:  cefepime   IVPB 1000 every 8 hours  metroNIDAZOLE  IVPB 500 every 12 hours  vancomycin  IVPB 750 daily      OTHER MEDS:  acetaminophen  Suppository .. 650 milliGRAM(s) Rectal every 6 hours PRN  ALBUTerol    90 MICROgram(s) HFA Inhaler 1 Puff(s) Inhalation every 4 hours  ALBUTerol/ipratropium for Nebulization 3 milliLiter(s) Nebulizer every 6 hours  ALBUTerol/ipratropium for Nebulization. 3 milliLiter(s) Nebulizer four times a day  ascorbic acid 500 milliGRAM(s) Oral daily  buDESOnide  80 MICROgram(s)/formoterol 4.5 MICROgram(s) Inhaler 2 Puff(s) Inhalation two times a day  carboxymethylcellulose 0.5% (Preservative-Free) Ophthalmic Solution 1 Drop(s) Both EYES three times a day PRN  chlorhexidine 2% Cloths 1 Application(s) Topical daily  clonazePAM  Tablet 1.5 milliGRAM(s) Oral at bedtime  dextrose 40% Gel 15 Gram(s) Oral once PRN  dextrose 5% + sodium chloride 0.9%. 1000 milliLiter(s) IV Continuous <Continuous>  dextrose 5%. 1000 milliLiter(s) IV Continuous <Continuous>  dextrose 50% Injectable 12.5 Gram(s) IV Push once  dextrose 50% Injectable 25 Gram(s) IV Push once  dextrose 50% Injectable 25 Gram(s) IV Push once  diphenhydrAMINE   Injectable 25 milliGRAM(s) IV Push two times a day  ergocalciferol Drops 5000 Unit(s) Oral daily  famotidine  IVPB 20 milliGRAM(s) IV Intermittent daily  ferrous    sulfate Liquid 300 milliGRAM(s) Enteral Tube daily  gabapentin 300 milliGRAM(s) Oral two times a day  glucagon  Injectable 1 milliGRAM(s) IntraMuscular once PRN  heparin  Injectable 5000 Unit(s) SubCutaneous every 12 hours  insulin lispro (HumaLOG) corrective regimen sliding scale   SubCutaneous every 6 hours  Medical Marijuana Awake Oil 2 milliLiter(s) 2 milliLiter(s) Enteral Tube <User Schedule>  Medical Marijuana Calm Oil 2 milliLiter(s) 2 milliLiter(s) Enteral Tube <User Schedule>  Medical Marijuana [place] Oil 2 milliLiter(s) 2 milliLiter(s) Enteral Tube <User Schedule>  midodrine 5 milliGRAM(s) Oral every 8 hours  multivitamin/minerals/iron Oral Solution (CENTRUM) 15 milliLiter(s) Oral daily  nystatin Powder 1 Application(s) Topical three times a day PRN  QUEtiapine 25 milliGRAM(s) Oral daily  tiotropium 18 MICROgram(s) Capsule 1 Capsule(s) Inhalation daily  tiotropium 18 MICROgram(s) Capsule 1 Capsule(s) Inhalation daily  tiZANidine 4 milliGRAM(s) Oral <User Schedule>  zinc sulfate 220 milliGRAM(s) Oral daily    PE:    Vital Signs Last 24 Hrs  T(C): 36.5 (2019 06:03), Max: 37.3 (2019 20:29)  T(F): 97.7 (2019 06:03), Max: 99.1 (2019 20:29)  HR: 95 (2019 10:12) (78 - 124)  BP: 101/64 (2019 06:03) (101/64 - 158/72)  BP(mean): --  RR: 24 (2019 20:09) (19 - 30)  SpO2: 100% (2019 10:12) (98% - 100%)    Gen: Arousable, NAD  CV: S1+S2 normal, no murmurs  Resp: Clear bilat, no resp distress  Abd: Soft, nontender  Ext: LE wounds  IV/Skin: right midline  Neuro: Lethargic    LABS:                          8.2    16.3  )-----------( 416      ( 2019 23:55 )             23.8       07-05    128<L>  |  95<L>  |  63<H>  ----------------------------<  117<H>  5.0   |  19<L>  |  1.40<H>    Ca    9.3      2019 23:55  Phos  4.6       Mg     1.7         TPro  4.9<L>  /  Alb  1.9<L>  /  TBili  0.1<L>  /  DBili  x   /  AST  14  /  ALT  9<L>  /  AlkPhos  72        Urinalysis Basic - ( 2019 08:30 )    Color: Yellow / Appearance: Turbid / S.011 / pH: x  Gluc: x / Ketone: Negative  / Bili: Negative / Urobili: <2 mg/dL   Blood: x / Protein: 100 mg/dL / Nitrite: Negative   Leuk Esterase: Large / RBC: 27 /HPF / WBC >720 /HPF   Sq Epi: x / Non Sq Epi: 3 /HPF / Bacteria: Negative        MICROBIOLOGY:  v  .Blood  19   No growth to date.  --  --      .Blood  19   No growth at 5 days.  --  --      .Blood  19   No growth at 5 days.  --  --      .Blood  19   No growth at 5 days.  --  --      .Blood  19   No growth at 5 days.  --  --      .Blood  19   No growth at 5 days.  --  --      .Urine  19   >100,000 CFU/ml Klebsiella oxytoca  >100,000 CFU/ml Pseudomonas aeruginosa (Carbapenem Resistant)  --  Klebsiella oxytoca  Pseudomonas aeruginosa (Carbapenem Resistant)      .Blood  19   Growth in anaerobic bottle: Staphylococcus epidermidis  --  Staphylococcus epidermidis    RADIOLOGY:    < from: CT Abdomen and Pelvis w/ Oral Cont (19 @ 19:34) >  IMPRESSION:     Impacted left sided airways with collapse of the lingula and left lower   lobe. Underlying pneumonia is not excluded.    Large sacral decubitus ulceration with concern for sacral osteomyelitis.    < end of copied text >

## 2019-07-06 NOTE — PROGRESS NOTE ADULT - SUBJECTIVE AND OBJECTIVE BOX
---___---___---___---___---___---___ ---___---___---___---___---___---___---___---___---                  M E D I C A L   A T T E N D I N G   P R O G R E S S   N O T E  ---___---___---___---___---___---___ ---___---___---___---___---___---___---___---___---        ================================================    ++CHIEF COMPLAINT:   Patient is a 69y old  Female who presents with a chief complaint of PEG tube dislodgement, fever, leukocytosis (2019 14:19)      Had RRT last night. with hypoxia and also was put on bipap     ---___---___---___---___---___---  PAST MEDICAL / Surgical  HISTORY:  PAST MEDICAL & SURGICAL HISTORY:  Type 2 diabetes mellitus  Dementia  DVT, lower extremity  CVA (cerebral vascular accident)  Fatty pancreas  PNA (pneumonia)  Pulmonary HTN  IGT (impaired glucose tolerance)  Ulcerative colitis  Acid reflux  Anxiety  Depression  Mouth sores  HLD (hyperlipidemia)  Asthma  S/P percutaneous endoscopic gastrostomy (PEG) tube placement: for dysphagia  Humeral head fracture  H/O: hysterectomy  H/O cataract extraction, left  History of knee replacement      ---___---___---___---___---___---  FAMILY HISTORY:   FAMILY HISTORY:  Family history of dementia (Grandparent)  Family history of colon cancer (Grandparent)        ---___---___---___---___---___---  ALLERGIES:   Allergies    ASA; dye contrast (Anaphylaxis)  aspirin (Short breath)  divalproex sodium (Other (Unknown))  Flowers and Plants (Short breath)  Haldol (Other (Unknown))  penicillin (Short breath; Rash)  sulfa drugs (Short breath; Rash)  vancomycin (Rash; Urticaria; Hives)  Xanax (Other (Unknown))    Intolerances        ---___---___---___---___---___---  MEDICATIONS:  MEDICATIONS  (STANDING):  ALBUTerol    90 MICROgram(s) HFA Inhaler 1 Puff(s) Inhalation every 4 hours  ALBUTerol/ipratropium for Nebulization 3 milliLiter(s) Nebulizer every 6 hours  ALBUTerol/ipratropium for Nebulization. 3 milliLiter(s) Nebulizer four times a day  ascorbic acid 500 milliGRAM(s) Oral daily  buDESOnide  80 MICROgram(s)/formoterol 4.5 MICROgram(s) Inhaler 2 Puff(s) Inhalation two times a day  cefepime   IVPB 1000 milliGRAM(s) IV Intermittent every 8 hours  chlorhexidine 2% Cloths 1 Application(s) Topical daily  clonazePAM  Tablet 1.5 milliGRAM(s) Oral at bedtime  dextrose 5% + sodium chloride 0.9%. 1000 milliLiter(s) (50 mL/Hr) IV Continuous <Continuous>  dextrose 5%. 1000 milliLiter(s) (50 mL/Hr) IV Continuous <Continuous>  dextrose 50% Injectable 12.5 Gram(s) IV Push once  dextrose 50% Injectable 25 Gram(s) IV Push once  dextrose 50% Injectable 25 Gram(s) IV Push once  diphenhydrAMINE   Injectable 25 milliGRAM(s) IV Push two times a day  ergocalciferol Drops 5000 Unit(s) Oral daily  famotidine  IVPB 20 milliGRAM(s) IV Intermittent daily  ferrous    sulfate Liquid 300 milliGRAM(s) Enteral Tube daily  gabapentin 300 milliGRAM(s) Oral two times a day  heparin  Injectable 5000 Unit(s) SubCutaneous every 12 hours  insulin lispro (HumaLOG) corrective regimen sliding scale   SubCutaneous every 6 hours  Medical Marijuana Awake Oil 2 milliLiter(s) 2 milliLiter(s) Enteral Tube <User Schedule>  Medical Marijuana Calm Oil 2 milliLiter(s) 2 milliLiter(s) Enteral Tube <User Schedule>  Medical Marijuana [place] Oil 2 milliLiter(s) 2 milliLiter(s) Enteral Tube <User Schedule>  metroNIDAZOLE  IVPB 500 milliGRAM(s) IV Intermittent every 12 hours  midodrine 5 milliGRAM(s) Oral every 8 hours  multivitamin/minerals/iron Oral Solution (CENTRUM) 15 milliLiter(s) Oral daily  QUEtiapine 25 milliGRAM(s) Oral daily  tiotropium 18 MICROgram(s) Capsule 1 Capsule(s) Inhalation daily  tiotropium 18 MICROgram(s) Capsule 1 Capsule(s) Inhalation daily  tiZANidine 4 milliGRAM(s) Oral <User Schedule>  vancomycin  IVPB 750 milliGRAM(s) IV Intermittent daily  zinc sulfate 220 milliGRAM(s) Oral daily    MEDICATIONS  (PRN):  acetaminophen  Suppository .. 650 milliGRAM(s) Rectal every 6 hours PRN Temp greater or equal to 38C (100.4F), Mild Pain (1 - 3)  carboxymethylcellulose 0.5% (Preservative-Free) Ophthalmic Solution 1 Drop(s) Both EYES three times a day PRN dry eyes  dextrose 40% Gel 15 Gram(s) Oral once PRN Blood Glucose LESS THAN 70 milliGRAM(s)/deciliter  glucagon  Injectable 1 milliGRAM(s) IntraMuscular once PRN Glucose LESS THAN 70 milligrams/deciliter  nystatin Powder 1 Application(s) Topical three times a day PRN under breasts      ---___---___---___---___---___---  REVIEW OF SYSTEM:    unable to obtain     ---___---___---___---___---___---  VITAL SIGNS:  69y , CAPILLARY BLOOD GLUCOSE      POCT Blood Glucose.: 145 mg/dL (2019 13:20)    T(C): 36.4 (19 @ 14:47), Max: 37.3 (19 @ 20:29)  HR: 101 (19 @ 14:47) (80 - 124)  BP: 95/68 (19 @ 14:47) (95/68 - 158/72)  RR: 18 (19 @ 14:47) (18 - 30)  SpO2: 100% (19 @ 14:47) (98% - 100%)  ---___---___---___---___---___---  PHYSICAL EXAM:    GEN: A&O X 0 , NAD , comfortable  HEENT: NCAT, PERRL, MMM, hearing intact  Neck: supple , no JVD  CVS: S1S2 , regular , No M/R/G appreciated  PULM: decreased breath sounds   ABD.: soft. non tender, non distended,  bowel sounds present  Extrem: intact pulses , no edema left leg in brace   Derm: No rash , no ecchymoses  large sacral decubitus noted         ---___---___---___---___---___---            LAB AND IMAGIN.2    16.3  )-----------( 416      ( 2019 23:55 )             23.8               07-05    128<L>  |  95<L>  |  63<H>  ----------------------------<  117<H>  5.0   |  19<L>  |  1.40<H>    Ca    9.3      2019 23:55  Phos  4.6     07-05  Mg     1.7     07-05    TPro  4.9<L>  /  Alb  1.9<L>  /  TBili  0.1<L>  /  DBili  x   /  AST  14  /  ALT  9<L>  /  AlkPhos  72  07-05                          ABG - ( 2019 23:51 )  pH, Arterial: 7.46  pH, Blood: x     /  pCO2: 29    /  pO2: 199   / HCO3: 21    / Base Excess: -2.2  /  SaO2: 100               Urinalysis Basic - ( 2019 08:30 )    Color: Yellow / Appearance: Turbid / S.011 / pH: x  Gluc: x / Ketone: Negative  / Bili: Negative / Urobili: <2 mg/dL   Blood: x / Protein: 100 mg/dL / Nitrite: Negative   Leuk Esterase: Large / RBC: 27 /HPF / WBC >720 /HPF   Sq Epi: x / Non Sq Epi: 3 /HPF / Bacteria: Negative        [All pertinent / recent Imaging reviewed]         ---___---___---___---___---___---___ ---___---___---___---___---                         A S S E S S M E N T   A N D   P L A N :    hypoxia possibly from mucous plug   order cxr patient does aspirate and on abx . if it is mucus plugging .consider chest pt ( but patient needs to be able to cough in order for chest pt to work )  and continue suction   fever improved  leukocytosis elevated id on boards   agitation on klonopin and seroquel   asthma on budesonide  recheck bun/cr   contineu heparin and change to eliquis when dc ready   chf meds on hold due to low bp   medical marijuana for chronic pain         -GI/DVT Prophylaxis.    --------------------------------------------  Case discussed with   Education given on   ___________________________  Thank you,  Deniz Bolden  [phone]

## 2019-07-06 NOTE — CHART NOTE - NSCHARTNOTEFT_GEN_A_CORE
Worsening Cr 1.51 noted and K 5.4, Na 126. Abnormal blood work results discussed with Dr. Bolden who wants renal consult Dr. Rachel taylor.  Spoke with Renal - recommended to send UA, Urine Osmo, Na, K, Cl, Protein, Cr, Serum Osmo, bladder scan, EKG stat, Renal US, BMP in am   Will give Albuterol neb Tx stat, will decide whether further treatment of hyperkalemia ( 5.4) after EKG result.  Discussed with RN and daughter at bedside in detail    Mimi Brush NP-C  #[id number]

## 2019-07-06 NOTE — PROGRESS NOTE ADULT - SUBJECTIVE AND OBJECTIVE BOX
MYRIAM WHITE:[id number],   69yFemale followed for:  ASA; dye contrast (Anaphylaxis)  aspirin (Short breath)  divalproex sodium (Other (Unknown))  Flowers and Plants (Short breath)  Haldol (Other (Unknown))  penicillin (Short breath; Rash)  sulfa drugs (Short breath; Rash)  vancomycin (Rash; Urticaria; Hives)  Xanax (Other (Unknown))    PAST MEDICAL & SURGICAL HISTORY:  Type 2 diabetes mellitus  Dementia  DVT, lower extremity  CVA (cerebral vascular accident)  Fatty pancreas  PNA (pneumonia)  Pulmonary HTN  IGT (impaired glucose tolerance)  Ulcerative colitis  Acid reflux  Anxiety  Depression  Mouth sores  HLD (hyperlipidemia)  Asthma  S/P percutaneous endoscopic gastrostomy (PEG) tube placement: for dysphagia  Humeral head fracture  H/O: hysterectomy  H/O cataract extraction, left  History of knee replacement    FAMILY HISTORY:  Family history of dementia (Grandparent)  Family history of colon cancer (Grandparent)    MEDICATIONS  (STANDING):  ALBUTerol    90 MICROgram(s) HFA Inhaler 1 Puff(s) Inhalation every 4 hours  ALBUTerol/ipratropium for Nebulization 3 milliLiter(s) Nebulizer every 6 hours  ALBUTerol/ipratropium for Nebulization. 3 milliLiter(s) Nebulizer four times a day  ascorbic acid 500 milliGRAM(s) Oral daily  buDESOnide  80 MICROgram(s)/formoterol 4.5 MICROgram(s) Inhaler 2 Puff(s) Inhalation two times a day  cefepime   IVPB 1000 milliGRAM(s) IV Intermittent every 8 hours  chlorhexidine 2% Cloths 1 Application(s) Topical daily  clonazePAM  Tablet 1.5 milliGRAM(s) Oral at bedtime  dextrose 5% + sodium chloride 0.9%. 1000 milliLiter(s) (50 mL/Hr) IV Continuous <Continuous>  dextrose 5%. 1000 milliLiter(s) (50 mL/Hr) IV Continuous <Continuous>  dextrose 50% Injectable 12.5 Gram(s) IV Push once  dextrose 50% Injectable 25 Gram(s) IV Push once  dextrose 50% Injectable 25 Gram(s) IV Push once  diphenhydrAMINE   Injectable 25 milliGRAM(s) IV Push two times a day  ergocalciferol Drops 5000 Unit(s) Oral daily  famotidine  IVPB 20 milliGRAM(s) IV Intermittent daily  ferrous    sulfate Liquid 300 milliGRAM(s) Enteral Tube daily  gabapentin 300 milliGRAM(s) Oral two times a day  heparin  Injectable 5000 Unit(s) SubCutaneous every 12 hours  insulin lispro (HumaLOG) corrective regimen sliding scale   SubCutaneous every 6 hours  Medical Marijuana Awake Oil 2 milliLiter(s) 2 milliLiter(s) Enteral Tube <User Schedule>  Medical Marijuana Calm Oil 2 milliLiter(s) 2 milliLiter(s) Enteral Tube <User Schedule>  Medical Marijuana [place] Oil 2 milliLiter(s) 2 milliLiter(s) Enteral Tube <User Schedule>  metroNIDAZOLE  IVPB 500 milliGRAM(s) IV Intermittent every 12 hours  midodrine 5 milliGRAM(s) Oral every 8 hours  multivitamin/minerals/iron Oral Solution (CENTRUM) 15 milliLiter(s) Oral daily  QUEtiapine 25 milliGRAM(s) Oral daily  tiotropium 18 MICROgram(s) Capsule 1 Capsule(s) Inhalation daily  tiotropium 18 MICROgram(s) Capsule 1 Capsule(s) Inhalation daily  tiZANidine 4 milliGRAM(s) Oral <User Schedule>  vancomycin  IVPB 750 milliGRAM(s) IV Intermittent daily  zinc sulfate 220 milliGRAM(s) Oral daily    MEDICATIONS  (PRN):  acetaminophen  Suppository .. 650 milliGRAM(s) Rectal every 6 hours PRN Temp greater or equal to 38C (100.4F), Mild Pain (1 - 3)  carboxymethylcellulose 0.5% (Preservative-Free) Ophthalmic Solution 1 Drop(s) Both EYES three times a day PRN dry eyes  dextrose 40% Gel 15 Gram(s) Oral once PRN Blood Glucose LESS THAN 70 milliGRAM(s)/deciliter  glucagon  Injectable 1 milliGRAM(s) IntraMuscular once PRN Glucose LESS THAN 70 milligrams/deciliter  nystatin Powder 1 Application(s) Topical three times a day PRN under breasts      Vital Signs Last 24 Hrs  T(C): 36.5 (06 Jul 2019 06:03), Max: 37.3 (05 Jul 2019 20:29)  T(F): 97.7 (06 Jul 2019 06:03), Max: 99.1 (05 Jul 2019 20:29)  HR: 95 (06 Jul 2019 10:12) (80 - 124)  BP: 101/64 (06 Jul 2019 06:03) (101/64 - 158/72)  BP(mean): --  RR: 24 (05 Jul 2019 20:09) (24 - 30)  SpO2: 100% (06 Jul 2019 10:12) (98% - 100%)  nc/at  s1s2  cta  soft, nt, nd no guarding or rebound  no c/c/e    CBC Full  -  ( 05 Jul 2019 23:55 )  WBC Count : 16.3 K/uL  RBC Count : 2.63 M/uL  Hemoglobin : 8.2 g/dL  Hematocrit : 23.8 %  Platelet Count - Automated : 416 K/uL  Mean Cell Volume : 90.6 fl  Mean Cell Hemoglobin : 31.1 pg  Mean Cell Hemoglobin Concentration : 34.3 gm/dL  Auto Neutrophil # : 13.9 K/uL  Auto Lymphocyte # : 0.6 K/uL  Auto Monocyte # : 1.0 K/uL  Auto Eosinophil # : 0.7 K/uL  Auto Basophil # : 0.0 K/uL  Auto Neutrophil % : 80.0 %  Auto Lymphocyte % : 4.0 %  Auto Monocyte % : 6.0 %  Auto Eosinophil % : 5.0 %  Auto Basophil % : 1.0 %    07-05    128<L>  |  95<L>  |  63<H>  ----------------------------<  117<H>  5.0   |  19<L>  |  1.40<H>    Ca    9.3      05 Jul 2019 23:55  Phos  4.6     07-05  Mg     1.7     07-05    TPro  4.9<L>  /  Alb  1.9<L>  /  TBili  0.1<L>  /  DBili  x   /  AST  14  /  ALT  9<L>  /  AlkPhos  72  07-05

## 2019-07-06 NOTE — CHART NOTE - NSCHARTNOTEFT_GEN_A_CORE
Patient is a 69y old  Female who presents with a chief complaint of PEG tube dislodgement, fever, leukocytosis (05 Jul 2019 15:27). Alerted by RN around 930pm that pt is SOB. Seen pt at bedside and an aid in the room. Pt is lips pursing breathing, not able to left head up in bed, non-verbal. Pt with Hx of severe dementia, Asthma, chorionic PEG and sacral decub. Now Pt is treated with triple ATB's for PNA and ID on board. V/S stable, no fever, and O2 sat 98%. Stat ABG: PH       , not impressive. Maintain O2 3L via NS, reposition pt. Re-check pt at bedside, no more lips breathing, pt is resting comfortably. Informed ABG results within normal limits to nurse. Around 11: 30pm, received call from nurse that pt's  wants to know ABG result,        Vital Signs Last 24 Hrs  T(C): 37.3 (05 Jul 2019 20:29), Max: 37.3 (05 Jul 2019 20:29)  T(F): 99.1 (05 Jul 2019 20:29), Max: 99.1 (05 Jul 2019 20:29)  HR: 124 (05 Jul 2019 20:09) (78 - 124)  BP: 158/72 (05 Jul 2019 20:09) (96/61 - 158/72)  BP(mean): --  RR: 24 (05 Jul 2019 20:09) (19 - 30)  SpO2: 98% (05 Jul 2019 20:09) (98% - 100%)      Labs:                          8.2    16.3  )-----------( 416      ( 05 Jul 2019 23:55 )             23.8     07-05    128<L>  |  95<L>  |  63<H>  ----------------------------<  117<H>  5.0   |  19<L>  |  1.40<H>    Ca    9.3      05 Jul 2019 23:55  Phos  4.6     07-05  Mg     1.7     07-05    TPro  4.9<L>  /  Alb  1.9<L>  /  TBili  0.1<L>  /  DBili  x   /  AST  14  /  ALT  9<L>  /  AlkPhos  72  07-05    ABG - ( 05 Jul 2019 23:51 )  pH, Arterial: 7.46  pH, Blood: x     /  pCO2: 29    /  pO2: 199   / HCO3: 21    / Base Excess: -2.2  /  SaO2: 100                 Radiology:    Physical Exam:        Respiratory: Diminished LS at base b/l, scatted rhonchi, no wheezing or crackles.  CV: RRR, S1S2+  Abdominal: Soft, BS+  Limbs: contracted with arms and legs.    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  thinks she must have pulled it out this morning. He says otherwise she was in her usual state of health at home. He says she has had some intermittent low grade fevers at home, which usually respond to Tylenol.   He says her Blackman was due to be changed early July 2019.   Last dose of Eliquis was 6/23/19 8pm.   In ED patient received 1LNS and cefepime. (24 Jun 2019 15:39)    >  >  >  >        Follow up with Attending in AM. Alerted by RN around 930pm that pt is SOB. Seen pt at bedside and an aid in the room. Pt is lips pursing breathing, not able to left head up in bed, non-verbal. Pt with multiple prolonged hospitalization related  to severe dementia, MRSA infection with spacer in place PEG, decubitus ulcers, UTI, aspiration pna, readmitted with dislodged PEG tube. Hospital course c/b c/f aspiration PNA, placed on vanco/cefepime/flagyl per ID. Pt with Hx of severe dementia, Asthma, chorionic PEG and sacral decub. Now Pt is treated with triple ATB's for PNA and ID on board. V/S stable, no fever, and O2 sat 98%. Stat ABG: PH       , not impressive. Maintain O2 3L via NS, reposition pt. Re-check pt at bedside, no more lips breathing, pt is resting comfortably. Informed ABG results within normal limits to nurse. Around 11: 30pm, received call from nurse that pt's  wants to know ABG result,        Vital Signs Last 24 Hrs  T(C): 37.3 (05 Jul 2019 20:29), Max: 37.3 (05 Jul 2019 20:29)  T(F): 99.1 (05 Jul 2019 20:29), Max: 99.1 (05 Jul 2019 20:29)  HR: 124 (05 Jul 2019 20:09) (78 - 124)  BP: 158/72 (05 Jul 2019 20:09) (96/61 - 158/72)  BP(mean): --  RR: 24 (05 Jul 2019 20:09) (19 - 30)  SpO2: 98% (05 Jul 2019 20:09) (98% - 100%)      Labs:                          8.2    16.3  )-----------( 416      ( 05 Jul 2019 23:55 )             23.8     07-05    128<L>  |  95<L>  |  63<H>  ----------------------------<  117<H>  5.0   |  19<L>  |  1.40<H>    Ca    9.3      05 Jul 2019 23:55  Phos  4.6     07-05  Mg     1.7     07-05    TPro  4.9<L>  /  Alb  1.9<L>  /  TBili  0.1<L>  /  DBili  x   /  AST  14  /  ALT  9<L>  /  AlkPhos  72  07-05    ABG - ( 05 Jul 2019 23:51 )  pH, Arterial: 7.46  pH, Blood: x     /  pCO2: 29    /  pO2: 199   / HCO3: 21    / Base Excess: -2.2  /  SaO2: 100                 Radiology:    Physical Exam:        Respiratory: Diminished LS at base b/l, scatted rhonchi, no wheezing or crackles.  CV: RRR, S1S2+  Abdominal: Soft, BS+  Limbs: contracted with arms and legs.    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 ED today with PEG tube being dislodged with bleeding at the site, found to have fever and leukocytosis on admission.  thinks she must have pulled it out this morning. He says otherwise she was in her usual state of health at home. He says she has had some intermittent low grade fevers at home, which usually respond to Tylenol.   He says her Blackman was due to be changed early July 2019.   Last dose of Eliquis was 6/23/19 8pm.   In ED patient received 1LNS and cefepime. (24 Jun 2019 15:39)    >  >  >  >        Follow up with Attending in AM. Alerted by RN around 930pm that pt is SOB. Seen pt at bedside and an aid in the room. Pt is lips pursing breathing, not able to left head up in bed, non-verbal. Pt with severe dementia, MRSA infection with spacer in place PEG, decubitus ulcers, UTI, aspiration pna, readmitted with dislodged PEG tube. Currently with aspiration PNA, treated on vanco/cefepime/flagyl per ID.  Now V/S stable, no fever, and O2 sat 98%. Stat ABG: PH     7.44/30/156/20/100, and lactate 1.8. Maintain O2 3L via NS, reposition pt. Re-check pt at bedside, no more lips breathing, pt is resting comfortably. Informed ABG results within normal limits to nurse.   Around 11: 30pm, received call from nurse that pt's  wants to know ABG result. Soon after the call, seen pt at beside,  is very unhappy regarding the ABG results "with normal limits ". When repositioning pt's head, feeling warm, and noted by RN that mental status changes. O2 sat 90%. Lips breath. Called RRT.       Vital Signs Last 24 Hrs  T(C): 37.3 (05 Jul 2019 20:29), Max: 37.3 (05 Jul 2019 20:29)  T(F): 99.1 (05 Jul 2019 20:29), Max: 99.1 (05 Jul 2019 20:29)  HR: 124 (05 Jul 2019 20:09) (78 - 124)  BP: 158/72 (05 Jul 2019 20:09) (96/61 - 158/72)  BP(mean): --  RR: 24 (05 Jul 2019 20:09) (19 - 30)  SpO2: 98% (05 Jul 2019 20:09) (98% - 100%)      Labs:                          8.2    16.3  )-----------( 416      ( 05 Jul 2019 23:55 )             23.8     07-05    128<L>  |  95<L>  |  63<H>  ----------------------------<  117<H>  5.0   |  19<L>  |  1.40<H>    Ca    9.3      05 Jul 2019 23:55  Phos  4.6     07-05  Mg     1.7     07-05    TPro  4.9<L>  /  Alb  1.9<L>  /  TBili  0.1<L>  /  DBili  x   /  AST  14  /  ALT  9<L>  /  AlkPhos  72  07-05    ABG - ( 05 Jul 2019 23:51 )  pH, Arterial: 7.46  pH, Blood: x     /  pCO2: 29    /  pO2: 199   / HCO3: 21    / Base Excess: -2.2  /  SaO2: 100                 Radiology:    Physical Exam:        Respiratory: Diminished LS at base b/l, scatted rhonchi, no wheezing or crackles.  CV: RRR, S1S2+  Abdominal: Soft, BS+  Limbs: contracted with arms and legs.    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  thinks she must have pulled it out this morning. He says otherwise she was in her usual state of health at home. He says she has had some intermittent low grade fevers at home, which usually respond to Tylenol.   He says her Blackman was due to be changed early July 2019.   Last dose of Eliquis was 6/23/19 8pm.   In ED patient received 1LNS and cefepime. (24 Jun 2019 15:39)    >Hypoxia:  pt is placed with vent by RRT.  >cont assess and monitor.        Follow up with Attending in AM. Alerted by RN around 930pm that pt is SOB. Seen pt at bedside and an aid in the room. Pt is lips pursing breathing, not able to left head up in bed, non-verbal. Pt with severe dementia, MRSA infection with spacer in place PEG, decubitus ulcers, UTI, aspiration pna, readmitted with dislodged PEG tube. Currently with aspiration PNA, treated on vanco/cefepime/flagyl per ID.  Now V/S stable, no fever, and O2 sat 98%. Diminished LS at base b/l, scatted rhonchi, no wheezing or crackles. S1S2+. Stat ABG: PH 7.44/30/156/20/100, and lactate 1.8. Maintain O2 3L via NS, reposition pt. Re-check pt at bedside, no more lips breathing, pt is resting comfortably. Informed ABG results within normal limits to nurse.   Around 11: 30pm, received call from nurse that pt's  wants to know ABG result. Soon after the call, seen pt at beside,  is very unhappy regarding the ABG results "with normal limits ". When repositioning pt's head, feeling warm, and noted by RN that mental status changes. O2 sat 90%. Lips breath. Called RRT. pt is placed with vent by RRT. Will cont assess and monitor.    Follow up with Attending in AM.

## 2019-07-06 NOTE — RAPID RESPONSE TEAM SUMMARY - NSSITUATIONBACKGROUNDRRT_GEN_ALL_CORE
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 Hospital course c/b c/f aspiration PNA, placed on vanco/cefepime/flagyl per ID. RRT called for AMS and hypoxia. Per family at bedside, patient normally is nonverbal but she is not this weak and can normally support her head from falling to the side. Saturating at 90% on arrival, wheezing on exam. Given duoneb treatment, and placed on BiPAP for work of breathing. ABG done, showing 7.46/29/199 w/ lactate of 1.3. Febrile to 100.9F, given IV acetaminophen. Abx already broadly covering MRSA, Pseudomonas, gram+/-, and anaerobes. RRT ended, and recommended to floor NP to follow up CBC and CMP.

## 2019-07-07 LAB
ANION GAP SERPL CALC-SCNC: 12 MMOL/L — SIGNIFICANT CHANGE UP (ref 5–17)
ANION GAP SERPL CALC-SCNC: 15 MMOL/L — SIGNIFICANT CHANGE UP (ref 5–17)
BASOPHILS # BLD AUTO: 0 K/UL — SIGNIFICANT CHANGE UP (ref 0–0.2)
BASOPHILS NFR BLD AUTO: 0.2 % — SIGNIFICANT CHANGE UP (ref 0–2)
BUN SERPL-MCNC: 74 MG/DL — HIGH (ref 7–23)
BUN SERPL-MCNC: 76 MG/DL — HIGH (ref 7–23)
CALCIUM SERPL-MCNC: 8.9 MG/DL — SIGNIFICANT CHANGE UP (ref 8.4–10.5)
CALCIUM SERPL-MCNC: 9.3 MG/DL — SIGNIFICANT CHANGE UP (ref 8.4–10.5)
CHLORIDE SERPL-SCNC: 94 MMOL/L — LOW (ref 96–108)
CHLORIDE SERPL-SCNC: 95 MMOL/L — LOW (ref 96–108)
CO2 SERPL-SCNC: 17 MMOL/L — LOW (ref 22–31)
CO2 SERPL-SCNC: 20 MMOL/L — LOW (ref 22–31)
CREAT SERPL-MCNC: 1.63 MG/DL — HIGH (ref 0.5–1.3)
CREAT SERPL-MCNC: 1.65 MG/DL — HIGH (ref 0.5–1.3)
EOSINOPHIL # BLD AUTO: 0.8 K/UL — HIGH (ref 0–0.5)
EOSINOPHIL NFR BLD AUTO: 7.6 % — HIGH (ref 0–6)
GLUCOSE BLDC GLUCOMTR-MCNC: 128 MG/DL — HIGH (ref 70–99)
GLUCOSE BLDC GLUCOMTR-MCNC: 136 MG/DL — HIGH (ref 70–99)
GLUCOSE BLDC GLUCOMTR-MCNC: 143 MG/DL — HIGH (ref 70–99)
GLUCOSE BLDC GLUCOMTR-MCNC: 144 MG/DL — HIGH (ref 70–99)
GLUCOSE BLDC GLUCOMTR-MCNC: 146 MG/DL — HIGH (ref 70–99)
GLUCOSE SERPL-MCNC: 106 MG/DL — HIGH (ref 70–99)
GLUCOSE SERPL-MCNC: 117 MG/DL — HIGH (ref 70–99)
HCT VFR BLD CALC: 22.6 % — LOW (ref 34.5–45)
HGB BLD-MCNC: 7.8 G/DL — LOW (ref 11.5–15.5)
LYMPHOCYTES # BLD AUTO: 0.7 K/UL — LOW (ref 1–3.3)
LYMPHOCYTES # BLD AUTO: 6.2 % — LOW (ref 13–44)
MCHC RBC-ENTMCNC: 31.4 PG — SIGNIFICANT CHANGE UP (ref 27–34)
MCHC RBC-ENTMCNC: 34.4 GM/DL — SIGNIFICANT CHANGE UP (ref 32–36)
MCV RBC AUTO: 91.3 FL — SIGNIFICANT CHANGE UP (ref 80–100)
MONOCYTES # BLD AUTO: 1 K/UL — HIGH (ref 0–0.9)
MONOCYTES NFR BLD AUTO: 9.7 % — SIGNIFICANT CHANGE UP (ref 2–14)
NEUTROPHILS # BLD AUTO: 8.2 K/UL — HIGH (ref 1.8–7.4)
NEUTROPHILS NFR BLD AUTO: 76.4 % — SIGNIFICANT CHANGE UP (ref 43–77)
PLATELET # BLD AUTO: 427 K/UL — HIGH (ref 150–400)
POTASSIUM SERPL-MCNC: 5.5 MMOL/L — HIGH (ref 3.5–5.3)
POTASSIUM SERPL-MCNC: 5.6 MMOL/L — HIGH (ref 3.5–5.3)
POTASSIUM SERPL-SCNC: 5.5 MMOL/L — HIGH (ref 3.5–5.3)
POTASSIUM SERPL-SCNC: 5.6 MMOL/L — HIGH (ref 3.5–5.3)
PROT ?TM UR-MCNC: 123 MG/DL — HIGH (ref 0–12)
RBC # BLD: 2.48 M/UL — LOW (ref 3.8–5.2)
RBC # FLD: 14.2 % — SIGNIFICANT CHANGE UP (ref 10.3–14.5)
SODIUM SERPL-SCNC: 126 MMOL/L — LOW (ref 135–145)
SODIUM SERPL-SCNC: 127 MMOL/L — LOW (ref 135–145)
VANCOMYCIN FLD-MCNC: 33.6 UG/ML
WBC # BLD: 10.8 K/UL — HIGH (ref 3.8–10.5)
WBC # FLD AUTO: 10.8 K/UL — HIGH (ref 3.8–10.5)

## 2019-07-07 PROCEDURE — 99233 SBSQ HOSP IP/OBS HIGH 50: CPT

## 2019-07-07 PROCEDURE — 76775 US EXAM ABDO BACK WALL LIM: CPT | Mod: 26

## 2019-07-07 RX ORDER — ALBUTEROL 90 UG/1
2.5 AEROSOL, METERED ORAL ONCE
Refills: 0 | Status: COMPLETED | OUTPATIENT
Start: 2019-07-07 | End: 2019-07-07

## 2019-07-07 RX ORDER — SODIUM CHLORIDE 9 MG/ML
1000 INJECTION INTRAMUSCULAR; INTRAVENOUS; SUBCUTANEOUS
Refills: 0 | Status: DISCONTINUED | OUTPATIENT
Start: 2019-07-07 | End: 2019-07-08

## 2019-07-07 RX ORDER — CEFEPIME 1 G/1
1000 INJECTION, POWDER, FOR SOLUTION INTRAMUSCULAR; INTRAVENOUS EVERY 12 HOURS
Refills: 0 | Status: DISCONTINUED | OUTPATIENT
Start: 2019-07-07 | End: 2019-07-07

## 2019-07-07 RX ORDER — SODIUM POLYSTYRENE SULFONATE 4.1 MEQ/G
15 POWDER, FOR SUSPENSION ORAL ONCE
Refills: 0 | Status: COMPLETED | OUTPATIENT
Start: 2019-07-07 | End: 2019-07-07

## 2019-07-07 RX ORDER — ALBUTEROL 90 UG/1
2 AEROSOL, METERED ORAL ONCE
Refills: 0 | Status: DISCONTINUED | OUTPATIENT
Start: 2019-07-07 | End: 2019-07-07

## 2019-07-07 RX ORDER — FAMOTIDINE 10 MG/ML
20 INJECTION INTRAVENOUS DAILY
Refills: 0 | Status: DISCONTINUED | OUTPATIENT
Start: 2019-07-07 | End: 2019-07-30

## 2019-07-07 RX ORDER — CEFEPIME 1 G/1
1000 INJECTION, POWDER, FOR SOLUTION INTRAMUSCULAR; INTRAVENOUS EVERY 24 HOURS
Refills: 0 | Status: DISCONTINUED | OUTPATIENT
Start: 2019-07-07 | End: 2019-07-09

## 2019-07-07 RX ADMIN — FAMOTIDINE 20 MILLIGRAM(S): 10 INJECTION INTRAVENOUS at 18:47

## 2019-07-07 RX ADMIN — ZINC SULFATE TAB 220 MG (50 MG ZINC EQUIVALENT) 220 MILLIGRAM(S): 220 (50 ZN) TAB at 14:30

## 2019-07-07 RX ADMIN — Medication 500 MILLIGRAM(S): at 14:30

## 2019-07-07 RX ADMIN — TIZANIDINE 4 MILLIGRAM(S): 4 TABLET ORAL at 20:55

## 2019-07-07 RX ADMIN — SODIUM CHLORIDE 50 MILLILITER(S): 9 INJECTION INTRAMUSCULAR; INTRAVENOUS; SUBCUTANEOUS at 14:31

## 2019-07-07 RX ADMIN — TIZANIDINE 4 MILLIGRAM(S): 4 TABLET ORAL at 09:02

## 2019-07-07 RX ADMIN — Medication 300 MILLIGRAM(S): at 14:30

## 2019-07-07 RX ADMIN — Medication 3 MILLILITER(S): at 18:47

## 2019-07-07 RX ADMIN — ALBUTEROL 2.5 MILLIGRAM(S): 90 AEROSOL, METERED ORAL at 21:33

## 2019-07-07 RX ADMIN — Medication 100 MILLIGRAM(S): at 18:47

## 2019-07-07 RX ADMIN — ERGOCALCIFEROL 5000 UNIT(S): 1.25 CAPSULE ORAL at 18:47

## 2019-07-07 RX ADMIN — GABAPENTIN 300 MILLIGRAM(S): 400 CAPSULE ORAL at 09:02

## 2019-07-07 RX ADMIN — Medication 100 MILLIGRAM(S): at 05:32

## 2019-07-07 RX ADMIN — GABAPENTIN 300 MILLIGRAM(S): 400 CAPSULE ORAL at 20:55

## 2019-07-07 RX ADMIN — Medication 1.5 MILLIGRAM(S): at 22:27

## 2019-07-07 RX ADMIN — Medication 3 MILLILITER(S): at 14:30

## 2019-07-07 RX ADMIN — HEPARIN SODIUM 5000 UNIT(S): 5000 INJECTION INTRAVENOUS; SUBCUTANEOUS at 18:47

## 2019-07-07 RX ADMIN — NYSTATIN CREAM 1 APPLICATION(S): 100000 CREAM TOPICAL at 18:48

## 2019-07-07 RX ADMIN — Medication 3 MILLILITER(S): at 05:31

## 2019-07-07 RX ADMIN — Medication 15 MILLILITER(S): at 14:30

## 2019-07-07 RX ADMIN — ALBUTEROL 2.5 MILLIGRAM(S): 90 AEROSOL, METERED ORAL at 22:22

## 2019-07-07 RX ADMIN — CHLORHEXIDINE GLUCONATE 1 APPLICATION(S): 213 SOLUTION TOPICAL at 14:30

## 2019-07-07 RX ADMIN — MIDODRINE HYDROCHLORIDE 5 MILLIGRAM(S): 2.5 TABLET ORAL at 14:30

## 2019-07-07 RX ADMIN — CEFEPIME 100 MILLIGRAM(S): 1 INJECTION, POWDER, FOR SOLUTION INTRAMUSCULAR; INTRAVENOUS at 05:32

## 2019-07-07 RX ADMIN — SODIUM POLYSTYRENE SULFONATE 15 GRAM(S): 4.1 POWDER, FOR SUSPENSION ORAL at 09:02

## 2019-07-07 RX ADMIN — QUETIAPINE FUMARATE 25 MILLIGRAM(S): 200 TABLET, FILM COATED ORAL at 22:27

## 2019-07-07 RX ADMIN — MIDODRINE HYDROCHLORIDE 5 MILLIGRAM(S): 2.5 TABLET ORAL at 05:31

## 2019-07-07 NOTE — CONSULT NOTE ADULT - SUBJECTIVE AND OBJECTIVE BOX
Patient is a 69y old  Female who presents with a chief complaint of PEG tube dislodgement, fever, leukocytosis (2019 11:03)      HPI:  Ms. 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  thinks she must have pulled it out this morning. He says otherwise she was in her usual state of health at home. He says she has had some intermittent low grade fevers at home, which usually respond to Tylenol.   He says her Blackman was due to be changed early 2019.   Last dose of Eliquis was 19 8pm.   In ED patient received 1LNS and cefepime. (2019 15:39)    Patient had baseline Scr of 0.3 mg/dl and started to increase on 19 to 0.56 mg/dl due to PEG tube feeding was held for 2 days. On 19 had her catheter changed and her Scr increased to 0.65 on 19. On 19 she had CT of abdomen and pelvis with oral contrast and her Scr increased to 1.12 mg/dl on 19. Last evening her Scr jumped to 1.51 and this am is 1.63 mg/dl. Currently, her vancomycin trough came back today at 33, on Friday  Nephrology was consulted for TRANG, hyponatremia, and hyperkalemia.        PAST MEDICAL & SURGICAL HISTORY:  Type 2 diabetes mellitus  Dementia  DVT, lower extremity  CVA (cerebral vascular accident)  Fatty pancreas  PNA (pneumonia)  Pulmonary HTN  IGT (impaired glucose tolerance)  Ulcerative colitis  Acid reflux  Anxiety  Depression  Mouth sores  HLD (hyperlipidemia)  Asthma  S/P percutaneous endoscopic gastrostomy (PEG) tube placement: for dysphagia  Humeral head fracture  H/O: hysterectomy  H/O cataract extraction, left  History of knee replacement      MEDICATIONS  (STANDING):  ALBUTerol    90 MICROgram(s) HFA Inhaler 1 Puff(s) Inhalation every 4 hours  ALBUTerol/ipratropium for Nebulization 3 milliLiter(s) Nebulizer every 6 hours  ALBUTerol/ipratropium for Nebulization. 3 milliLiter(s) Nebulizer four times a day  ascorbic acid 500 milliGRAM(s) Oral daily  buDESOnide  80 MICROgram(s)/formoterol 4.5 MICROgram(s) Inhaler 2 Puff(s) Inhalation two times a day  cefepime   IVPB 1000 milliGRAM(s) IV Intermittent every 8 hours  chlorhexidine 2% Cloths 1 Application(s) Topical daily  clonazePAM  Tablet 1.5 milliGRAM(s) Oral at bedtime  dextrose 5% + sodium chloride 0.9%. 1000 milliLiter(s) (50 mL/Hr) IV Continuous <Continuous>  dextrose 5%. 1000 milliLiter(s) (50 mL/Hr) IV Continuous <Continuous>  dextrose 50% Injectable 12.5 Gram(s) IV Push once  dextrose 50% Injectable 25 Gram(s) IV Push once  dextrose 50% Injectable 25 Gram(s) IV Push once  diphenhydrAMINE   Injectable 25 milliGRAM(s) IV Push two times a day  ergocalciferol Drops 5000 Unit(s) Oral daily  famotidine  IVPB 20 milliGRAM(s) IV Intermittent daily  ferrous    sulfate Liquid 300 milliGRAM(s) Enteral Tube daily  gabapentin 300 milliGRAM(s) Oral two times a day  heparin  Injectable 5000 Unit(s) SubCutaneous every 12 hours  insulin lispro (HumaLOG) corrective regimen sliding scale   SubCutaneous every 6 hours  Medical Marijuana Awake Oil 2 milliLiter(s) 2 milliLiter(s) Enteral Tube <User Schedule>  Medical Marijuana Calm Oil 2 milliLiter(s) 2 milliLiter(s) Enteral Tube <User Schedule>  Medical Marijuana [place] Oil 2 milliLiter(s) 2 milliLiter(s) Enteral Tube <User Schedule>  metroNIDAZOLE  IVPB 500 milliGRAM(s) IV Intermittent every 12 hours  midodrine 5 milliGRAM(s) Oral every 8 hours  multivitamin/minerals/iron Oral Solution (CENTRUM) 15 milliLiter(s) Oral daily  QUEtiapine 25 milliGRAM(s) Oral daily  sodium chloride 0.9%. 1000 milliLiter(s) (50 mL/Hr) IV Continuous <Continuous>  tiotropium 18 MICROgram(s) Capsule 1 Capsule(s) Inhalation daily  tiotropium 18 MICROgram(s) Capsule 1 Capsule(s) Inhalation daily  tiZANidine 4 milliGRAM(s) Oral <User Schedule>  vancomycin  IVPB 750 milliGRAM(s) IV Intermittent daily  zinc sulfate 220 milliGRAM(s) Oral daily      Allergies    ASA; dye contrast (Anaphylaxis)  aspirin (Short breath)  divalproex sodium (Other (Unknown))  Flowers and Plants (Short breath)  Haldol (Other (Unknown))  penicillin (Short breath; Rash)  sulfa drugs (Short breath; Rash)  vancomycin (Rash; Urticaria; Hives)  Xanax (Other (Unknown))    SOCIAL HISTORY:  Denies alcohol or tobacco abuse.    FAMILY HISTORY:  Family history of dementia (Grandparent)  Family history of colon cancer (Grandparent)    No kidney disease in family.    REVIEW OF SYSTEMS:  Unable to obtain as patient is non verbal.     VITAL:  T(C): , Max: 37.1 (19 @ 05:27)  T(F): , Max: 98.7 (19 @ 05:27)  HR: 92 (19 @ 09:18)  BP: 115/72 (19 @ 05:27)    RR: 18 (19 @ 05:27)  SpO2: 97% (19 @ 09:18)    PHYSICAL EXAM:  GEN: A&O X0 , NAD , comfortable  HEENT: NCAT, PERRL, MMM, hearing intact  Neck: supple , no JVD  CVS: S1S2 , regular , No M/R/G appreciated  PULM: decrease breath sounds noted   ABD.: PEG noted   Extrem: intact pulses ,  edema   Derm: No rash , no ecchymoses sacral decubitus noted stage 4       LABS:                        7.8    10.8  )-----------( 427      ( 2019 06:10 )             22.6     Na(126)/K(5.5)/Cl(94)/HCO3(20)/BUN(74)/Cr(1.63)Glu(106)/Ca(9.3)/Mg(--)/PO4(--)     @ 06:10  Na(126)/K(5.4)/Cl(94)/HCO3(19)/BUN(69)/Cr(1.51)Glu(143)/Ca(9.2)/Mg(--)/PO4(--)     @ 16:57  Na(128)/K(5.0)/Cl(95)/HCO3(19)/BUN(63)/Cr(1.40)Glu(117)/Ca(9.3)/Mg(1.7)/PO4(4.6)     @ 23:55  Na(130)/K(5.1)/Cl(99)/HCO3(18)/BUN(60)/Cr(1.27)Glu(113)/Ca(9.6)/Mg(--)/PO4(--)     @ 11:01      Urinalysis Basic - ( 2019 19:17 )    Color: Yellow / Appearance: Cloudy / S.017 / pH: x  Gluc: x / Ketone: Trace  / Bili: Negative / Urobili: Negative   Blood: x / Protein: 300 mg/dL / Nitrite: Negative   Leuk Esterase: Large / RBC: 10 /hpf / WBC >50   Sq Epi: x / Non Sq Epi: 0 / Bacteria: Moderate      Osmolality, Random Urine: 269 mos/kg ( @ 19:17)  Sodium, Random Urine: <20 mmol/L ( @ 19:17)  Potassium, Random Urine: 57 mmol/L ( @ 19:17)  Chloride, Random Urine: <35 mmol/L ( @ 19:17)  Creatinine, Random Urine: 20 mg/dL ( @ 19:17)

## 2019-07-07 NOTE — PROGRESS NOTE ADULT - SUBJECTIVE AND OBJECTIVE BOX
---___---___---___---___---___---___ ---___---___---___---___---___---___---___---___---                  M E D I C A L   A T T E N D I N G   P R O G R E S S   N O T E  ---___---___---___---___---___---___ ---___---___---___---___---___---___---___---___---        ================================================    ++CHIEF COMPLAINT:   Patient is a 69y old  Female who presents with a chief complaint of PEG tube dislodgement, fever, leukocytosis (2019 17:01)    pneumonia, fever and libia     ---___---___---___---___---___---  PAST MEDICAL / Surgical  HISTORY:  PAST MEDICAL & SURGICAL HISTORY:  Type 2 diabetes mellitus  Dementia  DVT, lower extremity  CVA (cerebral vascular accident)  Fatty pancreas  PNA (pneumonia)  Pulmonary HTN  IGT (impaired glucose tolerance)  Ulcerative colitis  Acid reflux  Anxiety  Depression  Mouth sores  HLD (hyperlipidemia)  Asthma  S/P percutaneous endoscopic gastrostomy (PEG) tube placement: for dysphagia  Humeral head fracture  H/O: hysterectomy  H/O cataract extraction, left  History of knee replacement      ---___---___---___---___---___---  FAMILY HISTORY:   FAMILY HISTORY:  Family history of dementia (Grandparent)  Family history of colon cancer (Grandparent)        ---___---___---___---___---___---  ALLERGIES:   Allergies    ASA; dye contrast (Anaphylaxis)  aspirin (Short breath)  divalproex sodium (Other (Unknown))  Flowers and Plants (Short breath)  Haldol (Other (Unknown))  penicillin (Short breath; Rash)  sulfa drugs (Short breath; Rash)  vancomycin (Rash; Urticaria; Hives)  Xanax (Other (Unknown))    Intolerances        ---___---___---___---___---___---  MEDICATIONS:  MEDICATIONS  (STANDING):  ALBUTerol    90 MICROgram(s) HFA Inhaler 1 Puff(s) Inhalation every 4 hours  ALBUTerol/ipratropium for Nebulization 3 milliLiter(s) Nebulizer every 6 hours  ALBUTerol/ipratropium for Nebulization. 3 milliLiter(s) Nebulizer four times a day  ascorbic acid 500 milliGRAM(s) Oral daily  buDESOnide  80 MICROgram(s)/formoterol 4.5 MICROgram(s) Inhaler 2 Puff(s) Inhalation two times a day  cefepime   IVPB 1000 milliGRAM(s) IV Intermittent every 8 hours  chlorhexidine 2% Cloths 1 Application(s) Topical daily  clonazePAM  Tablet 1.5 milliGRAM(s) Oral at bedtime  dextrose 5% + sodium chloride 0.9%. 1000 milliLiter(s) (50 mL/Hr) IV Continuous <Continuous>  dextrose 5%. 1000 milliLiter(s) (50 mL/Hr) IV Continuous <Continuous>  dextrose 50% Injectable 12.5 Gram(s) IV Push once  dextrose 50% Injectable 25 Gram(s) IV Push once  dextrose 50% Injectable 25 Gram(s) IV Push once  diphenhydrAMINE   Injectable 25 milliGRAM(s) IV Push two times a day  ergocalciferol Drops 5000 Unit(s) Oral daily  famotidine  IVPB 20 milliGRAM(s) IV Intermittent daily  ferrous    sulfate Liquid 300 milliGRAM(s) Enteral Tube daily  gabapentin 300 milliGRAM(s) Oral two times a day  heparin  Injectable 5000 Unit(s) SubCutaneous every 12 hours  insulin lispro (HumaLOG) corrective regimen sliding scale   SubCutaneous every 6 hours  Medical Marijuana Awake Oil 2 milliLiter(s) 2 milliLiter(s) Enteral Tube <User Schedule>  Medical Marijuana Calm Oil 2 milliLiter(s) 2 milliLiter(s) Enteral Tube <User Schedule>  Medical Marijuana [place] Oil 2 milliLiter(s) 2 milliLiter(s) Enteral Tube <User Schedule>  metroNIDAZOLE  IVPB 500 milliGRAM(s) IV Intermittent every 12 hours  midodrine 5 milliGRAM(s) Oral every 8 hours  multivitamin/minerals/iron Oral Solution (CENTRUM) 15 milliLiter(s) Oral daily  QUEtiapine 25 milliGRAM(s) Oral daily  tiotropium 18 MICROgram(s) Capsule 1 Capsule(s) Inhalation daily  tiotropium 18 MICROgram(s) Capsule 1 Capsule(s) Inhalation daily  tiZANidine 4 milliGRAM(s) Oral <User Schedule>  vancomycin  IVPB 750 milliGRAM(s) IV Intermittent daily  zinc sulfate 220 milliGRAM(s) Oral daily    MEDICATIONS  (PRN):  acetaminophen  Suppository .. 650 milliGRAM(s) Rectal every 6 hours PRN Temp greater or equal to 38C (100.4F), Mild Pain (1 - 3)  carboxymethylcellulose 0.5% (Preservative-Free) Ophthalmic Solution 1 Drop(s) Both EYES three times a day PRN dry eyes  dextrose 40% Gel 15 Gram(s) Oral once PRN Blood Glucose LESS THAN 70 milliGRAM(s)/deciliter  glucagon  Injectable 1 milliGRAM(s) IntraMuscular once PRN Glucose LESS THAN 70 milligrams/deciliter  nystatin Powder 1 Application(s) Topical three times a day PRN under breasts      ---___---___---___---___---___---  REVIEW OF SYSTEM:  unable to obtain     ---___---___---___---___---___---  VITAL SIGNS:  69y , CAPILLARY BLOOD GLUCOSE      POCT Blood Glucose.: 136 mg/dL (2019 06:29)    T(C): 37.1 (19 @ 05:27), Max: 37.1 (19 @ 05:27)  HR: 93 (19 @ 05:49) (91 - 108)  BP: 115/72 (19 @ 05:27) (95/68 - 119/75)  RR: 18 (19 @ 05:27) (18 - 18)  SpO2: 97% (19 @ 05:49) (97% - 100%)  ---___---___---___---___---___---  PHYSICAL EXAM:    GEN: A&O X0 , NAD , comfortable  HEENT: NCAT, PERRL, MMM, hearing intact  Neck: supple , no JVD  CVS: S1S2 , regular , No M/R/G appreciated  PULM: CTA B/L,  no W/R/R appreciated  ABD.: soft. non tender, non distended,  bowel sounds present peg noted   Extrem: intact pulses ,  edema   Derm: No rash , no ecchymoses sacral decubitus noted stage 4        ---___---___---___---___---___---            LAB AND IMAGIN.8    10.8  )-----------( 427      ( 2019 06:10 )             22.6               07-07    126<L>  |  94<L>  |  74<H>  ----------------------------<  106<H>  5.5<H>   |  20<L>  |  1.63<H>    Ca    9.3      2019 06:10  Phos  4.6     07-05  Mg     1.7     -    TPro  4.9<L>  /  Alb  1.9<L>  /  TBili  0.1<L>  /  DBili  x   /  AST  14  /  ALT  9<L>  /  AlkPhos  72  -05                          ABG - ( 2019 23:51 )  pH, Arterial: 7.46  pH, Blood: x     /  pCO2: 29    /  pO2: 199   / HCO3: 21    / Base Excess: -2.2  /  SaO2: 100               Urinalysis Basic - ( 2019 19:17 )    Color: Yellow / Appearance: Cloudy / S.017 / pH: x  Gluc: x / Ketone: Trace  / Bili: Negative / Urobili: Negative   Blood: x / Protein: 300 mg/dL / Nitrite: Negative   Leuk Esterase: Large / RBC: 10 /hpf / WBC >50   Sq Epi: x / Non Sq Epi: 0 / Bacteria: Moderate        [All pertinent / recent Imaging reviewed]         ---___---___---___---___---___---___ ---___---___---___---___---                         A S S E S S M E N T   A N D   P L A N :    hyperkalemia kayexalate and iv fluid ns @ 50 cc /hr   hyponatremia iv fluids renal consult   acute renal insufficiency as above   pneumonia  contiue abx id on board   chronic pain  on medical  marijuana   chf  hold meds   GI/DVT Prophylaxis. on heparin   sacral decubitus noted sowmyand care done     --------------------------------------------  Case discussed with   Education given on   ___________________________  Thank you,  Deniz Bolden  [phone] ---___---___---___---___---___---___ ---___---___---___---___---___---___---___---___---                  M E D I C A L   A T T E N D I N G   P R O G R E S S   N O T E  ---___---___---___---___---___---___ ---___---___---___---___---___---___---___---___---        ================================================    ++CHIEF COMPLAINT:   Patient is a 69y old  Female who presents with a chief complaint of PEG tube dislodgement, fever, leukocytosis (2019 17:01)    pneumonia, fever and libia     ---___---___---___---___---___---  PAST MEDICAL / Surgical  HISTORY:  PAST MEDICAL & SURGICAL HISTORY:  Type 2 diabetes mellitus  Dementia  DVT, lower extremity  CVA (cerebral vascular accident)  Fatty pancreas  PNA (pneumonia)  Pulmonary HTN  IGT (impaired glucose tolerance)  Ulcerative colitis  Acid reflux  Anxiety  Depression  Mouth sores  HLD (hyperlipidemia)  Asthma  S/P percutaneous endoscopic gastrostomy (PEG) tube placement: for dysphagia  Humeral head fracture  H/O: hysterectomy  H/O cataract extraction, left  History of knee replacement      ---___---___---___---___---___---  FAMILY HISTORY:   FAMILY HISTORY:  Family history of dementia (Grandparent)  Family history of colon cancer (Grandparent)        ---___---___---___---___---___---  ALLERGIES:   Allergies    ASA; dye contrast (Anaphylaxis)  aspirin (Short breath)  divalproex sodium (Other (Unknown))  Flowers and Plants (Short breath)  Haldol (Other (Unknown))  penicillin (Short breath; Rash)  sulfa drugs (Short breath; Rash)  vancomycin (Rash; Urticaria; Hives)  Xanax (Other (Unknown))    Intolerances        ---___---___---___---___---___---  MEDICATIONS:  MEDICATIONS  (STANDING):  ALBUTerol    90 MICROgram(s) HFA Inhaler 1 Puff(s) Inhalation every 4 hours  ALBUTerol/ipratropium for Nebulization 3 milliLiter(s) Nebulizer every 6 hours  ALBUTerol/ipratropium for Nebulization. 3 milliLiter(s) Nebulizer four times a day  ascorbic acid 500 milliGRAM(s) Oral daily  buDESOnide  80 MICROgram(s)/formoterol 4.5 MICROgram(s) Inhaler 2 Puff(s) Inhalation two times a day  cefepime   IVPB 1000 milliGRAM(s) IV Intermittent every 8 hours  chlorhexidine 2% Cloths 1 Application(s) Topical daily  clonazePAM  Tablet 1.5 milliGRAM(s) Oral at bedtime  dextrose 5% + sodium chloride 0.9%. 1000 milliLiter(s) (50 mL/Hr) IV Continuous <Continuous>  dextrose 5%. 1000 milliLiter(s) (50 mL/Hr) IV Continuous <Continuous>  dextrose 50% Injectable 12.5 Gram(s) IV Push once  dextrose 50% Injectable 25 Gram(s) IV Push once  dextrose 50% Injectable 25 Gram(s) IV Push once  diphenhydrAMINE   Injectable 25 milliGRAM(s) IV Push two times a day  ergocalciferol Drops 5000 Unit(s) Oral daily  famotidine  IVPB 20 milliGRAM(s) IV Intermittent daily  ferrous    sulfate Liquid 300 milliGRAM(s) Enteral Tube daily  gabapentin 300 milliGRAM(s) Oral two times a day  heparin  Injectable 5000 Unit(s) SubCutaneous every 12 hours  insulin lispro (HumaLOG) corrective regimen sliding scale   SubCutaneous every 6 hours  Medical Marijuana Awake Oil 2 milliLiter(s) 2 milliLiter(s) Enteral Tube <User Schedule>  Medical Marijuana Calm Oil 2 milliLiter(s) 2 milliLiter(s) Enteral Tube <User Schedule>  Medical Marijuana [place] Oil 2 milliLiter(s) 2 milliLiter(s) Enteral Tube <User Schedule>  metroNIDAZOLE  IVPB 500 milliGRAM(s) IV Intermittent every 12 hours  midodrine 5 milliGRAM(s) Oral every 8 hours  multivitamin/minerals/iron Oral Solution (CENTRUM) 15 milliLiter(s) Oral daily  QUEtiapine 25 milliGRAM(s) Oral daily  tiotropium 18 MICROgram(s) Capsule 1 Capsule(s) Inhalation daily  tiotropium 18 MICROgram(s) Capsule 1 Capsule(s) Inhalation daily  tiZANidine 4 milliGRAM(s) Oral <User Schedule>  vancomycin  IVPB 750 milliGRAM(s) IV Intermittent daily  zinc sulfate 220 milliGRAM(s) Oral daily    MEDICATIONS  (PRN):  acetaminophen  Suppository .. 650 milliGRAM(s) Rectal every 6 hours PRN Temp greater or equal to 38C (100.4F), Mild Pain (1 - 3)  carboxymethylcellulose 0.5% (Preservative-Free) Ophthalmic Solution 1 Drop(s) Both EYES three times a day PRN dry eyes  dextrose 40% Gel 15 Gram(s) Oral once PRN Blood Glucose LESS THAN 70 milliGRAM(s)/deciliter  glucagon  Injectable 1 milliGRAM(s) IntraMuscular once PRN Glucose LESS THAN 70 milligrams/deciliter  nystatin Powder 1 Application(s) Topical three times a day PRN under breasts      ---___---___---___---___---___---  REVIEW OF SYSTEM:  unable to obtain     ---___---___---___---___---___---  VITAL SIGNS:  69y , CAPILLARY BLOOD GLUCOSE      POCT Blood Glucose.: 136 mg/dL (2019 06:29)    T(C): 37.1 (19 @ 05:27), Max: 37.1 (19 @ 05:27)  HR: 93 (19 @ 05:49) (91 - 108)  BP: 115/72 (19 @ 05:27) (95/68 - 119/75)  RR: 18 (19 @ 05:27) (18 - 18)  SpO2: 97% (19 @ 05:49) (97% - 100%)  ---___---___---___---___---___---  PHYSICAL EXAM:    GEN: A&O X0 , NAD , comfortable  HEENT: NCAT, PERRL, MMM, hearing intact  Neck: supple , no JVD  CVS: S1S2 , regular , No M/R/G appreciated  PULM: decrease breath sounds noted   ABD.: soft. non tender, non distended,  bowel sounds present peg noted   Extrem: intact pulses ,  edema   Derm: No rash , no ecchymoses sacral decubitus noted stage 4        ---___---___---___---___---___---            LAB AND IMAGIN.8    10.8  )-----------( 427      ( 2019 06:10 )             22.6               07-07    126<L>  |  94<L>  |  74<H>  ----------------------------<  106<H>  5.5<H>   |  20<L>  |  1.63<H>    Ca    9.3      2019 06:10  Phos  4.6     07-05  Mg     1.7     -    TPro  4.9<L>  /  Alb  1.9<L>  /  TBili  0.1<L>  /  DBili  x   /  AST  14  /  ALT  9<L>  /  AlkPhos  72  07-05                          ABG - ( 2019 23:51 )  pH, Arterial: 7.46  pH, Blood: x     /  pCO2: 29    /  pO2: 199   / HCO3: 21    / Base Excess: -2.2  /  SaO2: 100               Urinalysis Basic - ( 2019 19:17 )    Color: Yellow / Appearance: Cloudy / S.017 / pH: x  Gluc: x / Ketone: Trace  / Bili: Negative / Urobili: Negative   Blood: x / Protein: 300 mg/dL / Nitrite: Negative   Leuk Esterase: Large / RBC: 10 /hpf / WBC >50   Sq Epi: x / Non Sq Epi: 0 / Bacteria: Moderate        [All pertinent / recent Imaging reviewed]         ---___---___---___---___---___---___ ---___---___---___---___---                         A S S E S S M E N T   A N D   P L A N :    hyperkalemia kayexalate and iv fluid ns @ 50 cc /hr   hyponatremia iv fluids renal consult   acute renal insufficiency as above   pneumonia  contiue abx id on board   chronic pain  on medical  marijuana   chf  hold meds   GI/DVT Prophylaxis. on heparin   sacral decubitus noted wouynd care done     --------------------------------------------  Case discussed with   Education given on   ___________________________  Thank you,  Deniz Bolden  [phone]

## 2019-07-07 NOTE — PROGRESS NOTE ADULT - SUBJECTIVE AND OBJECTIVE BOX
MYRIAM WHITE:[id number],   69yFemale followed for:  ASA; dye contrast (Anaphylaxis)  aspirin (Short breath)  divalproex sodium (Other (Unknown))  Flowers and Plants (Short breath)  Haldol (Other (Unknown))  penicillin (Short breath; Rash)  sulfa drugs (Short breath; Rash)  vancomycin (Rash; Urticaria; Hives)  Xanax (Other (Unknown))    PAST MEDICAL & SURGICAL HISTORY:  Type 2 diabetes mellitus  Dementia  DVT, lower extremity  CVA (cerebral vascular accident)  Fatty pancreas  PNA (pneumonia)  Pulmonary HTN  IGT (impaired glucose tolerance)  Ulcerative colitis  Acid reflux  Anxiety  Depression  Mouth sores  HLD (hyperlipidemia)  Asthma  S/P percutaneous endoscopic gastrostomy (PEG) tube placement: for dysphagia  Humeral head fracture  H/O: hysterectomy  H/O cataract extraction, left  History of knee replacement    FAMILY HISTORY:  Family history of dementia (Grandparent)  Family history of colon cancer (Grandparent)    MEDICATIONS  (STANDING):  ALBUTerol    90 MICROgram(s) HFA Inhaler 1 Puff(s) Inhalation every 4 hours  ALBUTerol/ipratropium for Nebulization 3 milliLiter(s) Nebulizer every 6 hours  ALBUTerol/ipratropium for Nebulization. 3 milliLiter(s) Nebulizer four times a day  ascorbic acid 500 milliGRAM(s) Oral daily  buDESOnide  80 MICROgram(s)/formoterol 4.5 MICROgram(s) Inhaler 2 Puff(s) Inhalation two times a day  cefepime   IVPB 1000 milliGRAM(s) IV Intermittent every 8 hours  chlorhexidine 2% Cloths 1 Application(s) Topical daily  clonazePAM  Tablet 1.5 milliGRAM(s) Oral at bedtime  dextrose 5% + sodium chloride 0.9%. 1000 milliLiter(s) (50 mL/Hr) IV Continuous <Continuous>  dextrose 5%. 1000 milliLiter(s) (50 mL/Hr) IV Continuous <Continuous>  dextrose 50% Injectable 12.5 Gram(s) IV Push once  dextrose 50% Injectable 25 Gram(s) IV Push once  dextrose 50% Injectable 25 Gram(s) IV Push once  diphenhydrAMINE   Injectable 25 milliGRAM(s) IV Push two times a day  ergocalciferol Drops 5000 Unit(s) Oral daily  famotidine  IVPB 20 milliGRAM(s) IV Intermittent daily  ferrous    sulfate Liquid 300 milliGRAM(s) Enteral Tube daily  gabapentin 300 milliGRAM(s) Oral two times a day  heparin  Injectable 5000 Unit(s) SubCutaneous every 12 hours  insulin lispro (HumaLOG) corrective regimen sliding scale   SubCutaneous every 6 hours  Medical Marijuana Awake Oil 2 milliLiter(s) 2 milliLiter(s) Enteral Tube <User Schedule>  Medical Marijuana Calm Oil 2 milliLiter(s) 2 milliLiter(s) Enteral Tube <User Schedule>  Medical Marijuana [place] Oil 2 milliLiter(s) 2 milliLiter(s) Enteral Tube <User Schedule>  metroNIDAZOLE  IVPB 500 milliGRAM(s) IV Intermittent every 12 hours  midodrine 5 milliGRAM(s) Oral every 8 hours  multivitamin/minerals/iron Oral Solution (CENTRUM) 15 milliLiter(s) Oral daily  QUEtiapine 25 milliGRAM(s) Oral daily  sodium chloride 0.9%. 1000 milliLiter(s) (50 mL/Hr) IV Continuous <Continuous>  tiotropium 18 MICROgram(s) Capsule 1 Capsule(s) Inhalation daily  tiotropium 18 MICROgram(s) Capsule 1 Capsule(s) Inhalation daily  tiZANidine 4 milliGRAM(s) Oral <User Schedule>  vancomycin  IVPB 750 milliGRAM(s) IV Intermittent daily  zinc sulfate 220 milliGRAM(s) Oral daily    MEDICATIONS  (PRN):  acetaminophen  Suppository .. 650 milliGRAM(s) Rectal every 6 hours PRN Temp greater or equal to 38C (100.4F), Mild Pain (1 - 3)  carboxymethylcellulose 0.5% (Preservative-Free) Ophthalmic Solution 1 Drop(s) Both EYES three times a day PRN dry eyes  dextrose 40% Gel 15 Gram(s) Oral once PRN Blood Glucose LESS THAN 70 milliGRAM(s)/deciliter  glucagon  Injectable 1 milliGRAM(s) IntraMuscular once PRN Glucose LESS THAN 70 milligrams/deciliter  nystatin Powder 1 Application(s) Topical three times a day PRN under breasts      Vital Signs Last 24 Hrs  T(C): 37.1 (07 Jul 2019 05:27), Max: 37.1 (07 Jul 2019 05:27)  T(F): 98.7 (07 Jul 2019 05:27), Max: 98.7 (07 Jul 2019 05:27)  HR: 92 (07 Jul 2019 09:18) (91 - 108)  BP: 115/72 (07 Jul 2019 05:27) (95/68 - 119/75)  BP(mean): --  RR: 18 (07 Jul 2019 05:27) (18 - 18)  SpO2: 97% (07 Jul 2019 09:18) (97% - 100%)  nc/at  s1s2  cta  soft, nt, nd no guarding or rebound  no c/c/e    CBC Full  -  ( 07 Jul 2019 06:10 )  WBC Count : 10.8 K/uL  RBC Count : 2.48 M/uL  Hemoglobin : 7.8 g/dL  Hematocrit : 22.6 %  Platelet Count - Automated : 427 K/uL  Mean Cell Volume : 91.3 fl  Mean Cell Hemoglobin : 31.4 pg  Mean Cell Hemoglobin Concentration : 34.4 gm/dL  Auto Neutrophil # : 8.2 K/uL  Auto Lymphocyte # : 0.7 K/uL  Auto Monocyte # : 1.0 K/uL  Auto Eosinophil # : 0.8 K/uL  Auto Basophil # : 0.0 K/uL  Auto Neutrophil % : 76.4 %  Auto Lymphocyte % : 6.2 %  Auto Monocyte % : 9.7 %  Auto Eosinophil % : 7.6 %  Auto Basophil % : 0.2 %    07-07    126<L>  |  94<L>  |  74<H>  ----------------------------<  106<H>  5.5<H>   |  20<L>  |  1.63<H>    Ca    9.3      07 Jul 2019 06:10  Phos  4.6     07-05  Mg     1.7     07-05    TPro  4.9<L>  /  Alb  1.9<L>  /  TBili  0.1<L>  /  DBili  x   /  AST  14  /  ALT  9<L>  /  AlkPhos  72  07-05

## 2019-07-07 NOTE — PROGRESS NOTE ADULT - SUBJECTIVE AND OBJECTIVE BOX
PULMONARY PROGRESS NOTE    MYRIAM HEATH  MRN-[id number]    Patient is a 69y old  Female who presents with a chief complaint of PEG tube dislodgement, fever, leukocytosis (07 Jul 2019 08:25)      HPI:  -Appears comfortable  -  -  -    ROS:   -  -    ACTIVE MEDICATION LIST:  MEDICATIONS  (STANDING):  ALBUTerol    90 MICROgram(s) HFA Inhaler 1 Puff(s) Inhalation every 4 hours  ALBUTerol/ipratropium for Nebulization 3 milliLiter(s) Nebulizer every 6 hours  ALBUTerol/ipratropium for Nebulization. 3 milliLiter(s) Nebulizer four times a day  ascorbic acid 500 milliGRAM(s) Oral daily  buDESOnide  80 MICROgram(s)/formoterol 4.5 MICROgram(s) Inhaler 2 Puff(s) Inhalation two times a day  cefepime   IVPB 1000 milliGRAM(s) IV Intermittent every 8 hours  chlorhexidine 2% Cloths 1 Application(s) Topical daily  clonazePAM  Tablet 1.5 milliGRAM(s) Oral at bedtime  dextrose 5% + sodium chloride 0.9%. 1000 milliLiter(s) (50 mL/Hr) IV Continuous <Continuous>  dextrose 5%. 1000 milliLiter(s) (50 mL/Hr) IV Continuous <Continuous>  dextrose 50% Injectable 12.5 Gram(s) IV Push once  dextrose 50% Injectable 25 Gram(s) IV Push once  dextrose 50% Injectable 25 Gram(s) IV Push once  diphenhydrAMINE   Injectable 25 milliGRAM(s) IV Push two times a day  ergocalciferol Drops 5000 Unit(s) Oral daily  famotidine  IVPB 20 milliGRAM(s) IV Intermittent daily  ferrous    sulfate Liquid 300 milliGRAM(s) Enteral Tube daily  gabapentin 300 milliGRAM(s) Oral two times a day  heparin  Injectable 5000 Unit(s) SubCutaneous every 12 hours  insulin lispro (HumaLOG) corrective regimen sliding scale   SubCutaneous every 6 hours  Medical Marijuana Awake Oil 2 milliLiter(s) 2 milliLiter(s) Enteral Tube <User Schedule>  Medical Marijuana Calm Oil 2 milliLiter(s) 2 milliLiter(s) Enteral Tube <User Schedule>  Medical Marijuana [place] Oil 2 milliLiter(s) 2 milliLiter(s) Enteral Tube <User Schedule>  metroNIDAZOLE  IVPB 500 milliGRAM(s) IV Intermittent every 12 hours  midodrine 5 milliGRAM(s) Oral every 8 hours  multivitamin/minerals/iron Oral Solution (CENTRUM) 15 milliLiter(s) Oral daily  QUEtiapine 25 milliGRAM(s) Oral daily  sodium chloride 0.9%. 1000 milliLiter(s) (50 mL/Hr) IV Continuous <Continuous>  sodium polystyrene sulfonate Suspension 15 Gram(s) Enteral Tube once  tiotropium 18 MICROgram(s) Capsule 1 Capsule(s) Inhalation daily  tiotropium 18 MICROgram(s) Capsule 1 Capsule(s) Inhalation daily  tiZANidine 4 milliGRAM(s) Oral <User Schedule>  vancomycin  IVPB 750 milliGRAM(s) IV Intermittent daily  zinc sulfate 220 milliGRAM(s) Oral daily    MEDICATIONS  (PRN):  acetaminophen  Suppository .. 650 milliGRAM(s) Rectal every 6 hours PRN Temp greater or equal to 38C (100.4F), Mild Pain (1 - 3)  carboxymethylcellulose 0.5% (Preservative-Free) Ophthalmic Solution 1 Drop(s) Both EYES three times a day PRN dry eyes  dextrose 40% Gel 15 Gram(s) Oral once PRN Blood Glucose LESS THAN 70 milliGRAM(s)/deciliter  glucagon  Injectable 1 milliGRAM(s) IntraMuscular once PRN Glucose LESS THAN 70 milligrams/deciliter  nystatin Powder 1 Application(s) Topical three times a day PRN under breasts      EXAM:  Vital Signs Last 24 Hrs  T(C): 37.1 (07 Jul 2019 05:27), Max: 37.1 (07 Jul 2019 05:27)  T(F): 98.7 (07 Jul 2019 05:27), Max: 98.7 (07 Jul 2019 05:27)  HR: 93 (07 Jul 2019 05:49) (91 - 108)  BP: 115/72 (07 Jul 2019 05:27) (95/68 - 119/75)  BP(mean): --  RR: 18 (07 Jul 2019 05:27) (18 - 18)  SpO2: 97% (07 Jul 2019 05:49) (97% - 100%)    GENERAL: The patient is awake and alert in no apparent distress.     SKIN: Warm, dry, no rashes    LUNGS: Clear to auscultation without wheezing, rales or rhonchi; respirations unlabored    HEART: Regular rate and rhythm without murmur.    ABDOMEN: +BS, Soft, Nontender    EXTREMITIES: No clubbing, cyanosis, edema    Labs:                        7.8    10.8  )-----------( 427      ( 07 Jul 2019 06:10 )             22.6   07-07    126<L>  |  94<L>  |  74<H>  ----------------------------<  106<H>  5.5<H>   |  20<L>  |  1.63<H>    Ca    9.3      07 Jul 2019 06:10  Phos  4.6     07-05  Mg     1.7     07-05    TPro  4.9<L>  /  Alb  1.9<L>  /  TBili  0.1<L>  /  DBili  x   /  AST  14  /  ALT  9<L>  /  AlkPhos  72  07-05      PROBLEM LIST:  69y Female with HEALTH ISSUES - PROBLEM Dx:  Fever: Fever  Type 2 diabetes mellitus without complication, without long-term current use of insulin:   Make sure you get your HgA1c checked every three months.  If you take oral diabetes medications, check your blood glucose two times a day.  If you take insulin, check your blood glucose before meals and at bedtime.  It&#x27;s important not to skip any meals.  Keep a log of your blood glucose results and always take it with you to your doctor appointments.  Keep a list of your current medications including injectables and over the counter medications and bring this medication list with you to all your doctor appointments.  If you have not seen your ophthalmologist this year call for appointment.  Check your feet daily for redness, sores, or openings. Do not self treat. If no improvement in two days call your primary care physician for an appointment.  Low blood sugar (hypoglycemia) is a blood sugar below 70mg/dl. Check your blood sugar if you feel signs/symptoms of hypoglycemia. If your blood sugar is below 70 take 15 grams of carbohydrates (ex 4 oz of apple juice, 3-4 glucose tablets, or 4-6 oz of regular soda) wait 15 minutes and repeat blood sugar to make sure it comes up above 70.  If your blood sugar is above 70 and you are due for a meal, have a meal.  If you are not due for a meal have a snack.  This snack helps keeps your blood sugar at a safe range.    Pressure injury of skin of sacral region, unspecified injury stage: Continue with wound care as instructed.   Maintain adequate nutrition, frequent repositioning , offloading with Zfloat boots.  Follow-up with your primary care physician and Wound Care Specialist within 1 week. Call for appointment.    Chronic deep vein thrombosis (DVT) of right lower extremity, unspecified vein: Take your &quot;blood thinners&quot; as prescribed.  Walking is encouraged, increase activity as tolerated.  If you develop new leg pain, swelling, and/or redness contact your healthcare provider.  If you develop new chest pain with difficulty breathing, a rapid heart rate and/or a feeling of passing out call emergency medical services 911.    Prophylactic measure: Prophylactic measure  Dementia without behavioral disturbance, unspecified dementia type: Continue with medications as prescribed by your doctor.  Maintain safe environment.  Maintain adequate nutrition, hydration.  Follow-up with your primary care physician within 1 week. Call for appointment.    Uncomplicated asthma, unspecified asthma severity, unspecified whether persistent: Stable.  Follow-up with your primary care physician within 1 week. Call for appointment.    Sepsis, due to unspecified organism: Take all antibiotics as ordered.  Call you Health care provider upon arrival home to make a one week follow up appointment.  If you develop fever, chills, malaise, or change in mental status call your Health Care Provider or go to the Emergency Department.  Nutrition is important, eat small frequent meals to help ensure you get adequate calories.  Do not stay in bed all day!  Increase your activity daily as tolerated.    PEG tube malfunction: PEG tube malfunction            RECS:  O2  Nebulizers  Abx  ID f/u  f/u CBC/Temp curve    Lauren Hector DO  [phone]

## 2019-07-07 NOTE — CONSULT NOTE ADULT - ASSESSMENT
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 since admission, went to IR to have PEG replaced. Nephrology consulted for hyponatremia, TRANG and hyperkalemia.    1. TRANG likely multifactorial from prerenal azotemia to now ischemic ATN from vancomycin. Her trough today is 33.   2. Hyponatremia with urine sodium of <20 from poor peg tube digestion residual function from tube. Similar to a "tea and toast diet.  3. Significant proteinuria of 6.15 grams. She is in catabolic state and also cause severe hyponatremia. She needs osmoles and high protein in her diet.   4. High anion gap metabolic acidosis.   5. Hyperkalemia secondary to TRANG.     RECOMMEND:  - Continue IVF of NS until 18:00 PM.  - BMP at 18:00 PM  - Will need to continue to feed patient and possible change her to a higher protein diet as she is wasting significant protein in her urine.   - Once the BMP results will consider adding 0.45% with 75 meq of NaHCO3 at 50 cc/hr. Will start at 19:00 pm. This will help her hyperkalemia and TRANG.     Spoke to  and daughter at bedside. Spoke to Dr. Bolden.     Thank you for involving [employer] in this patient's care.    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 since admission, went to IR to have PEG replaced. Nephrology consulted for hyponatremia, TRANG and hyperkalemia.    1. TRANG likely multifactorial from prerenal azotemia to now ischemic ATN from vancomycin. Her trough today is 33.   2. Hyponatremia with urine sodium of <20 from poor peg tube digestion residual function from tube. Similar to a "tea and toast diet.  3. Significant proteinuria of 6.15 grams. She is in catabolic state and also cause severe hyponatremia. She needs osmoles and high protein in her diet.   4. High anion gap metabolic acidosis.   5. Hyperkalemia secondary to TRANG.     RECOMMEND:  - Continue IVF of NS until 18:00 PM.  - BMP at 18:00 PM  - Will need to continue to feed patient and possible change her to a higher protein diet as she is wasting significant protein in her urine.   - Hold the vancomycin. Trough is high.   - Renal dose all medications for a GFR <10.  - Once the BMP results will consider adding 0.45% with 75 meq of NaHCO3 at 50 cc/hr. Will start at 19:00 pm. This will help her hyperkalemia and TRANG.     Spoke to  and daughter at bedside. Spoke to Dr. Bolden.     Thank you for involving [employer] in this patient's care.    With warm regards,    Alla Chao, DO  [employer]  [phone]

## 2019-07-08 LAB
ANION GAP SERPL CALC-SCNC: 11 MMOL/L — SIGNIFICANT CHANGE UP (ref 5–17)
BLD GP AB SCN SERPL QL: NEGATIVE — SIGNIFICANT CHANGE UP
BUN SERPL-MCNC: 76 MG/DL — HIGH (ref 7–23)
CALCIUM SERPL-MCNC: 9.2 MG/DL — SIGNIFICANT CHANGE UP (ref 8.4–10.5)
CHLORIDE SERPL-SCNC: 98 MMOL/L — SIGNIFICANT CHANGE UP (ref 96–108)
CO2 SERPL-SCNC: 21 MMOL/L — LOW (ref 22–31)
CREAT SERPL-MCNC: 1.84 MG/DL — HIGH (ref 0.5–1.3)
GAS PNL BLDV: SIGNIFICANT CHANGE UP
GLUCOSE BLDC GLUCOMTR-MCNC: 109 MG/DL — HIGH (ref 70–99)
GLUCOSE BLDC GLUCOMTR-MCNC: 129 MG/DL — HIGH (ref 70–99)
GLUCOSE BLDC GLUCOMTR-MCNC: 143 MG/DL — HIGH (ref 70–99)
GLUCOSE BLDC GLUCOMTR-MCNC: 171 MG/DL — HIGH (ref 70–99)
GLUCOSE SERPL-MCNC: 124 MG/DL — HIGH (ref 70–99)
HCT VFR BLD CALC: 20.8 % — CRITICAL LOW (ref 34.5–45)
HGB BLD-MCNC: 7.1 G/DL — LOW (ref 11.5–15.5)
MCHC RBC-ENTMCNC: 30.7 PG — SIGNIFICANT CHANGE UP (ref 27–34)
MCHC RBC-ENTMCNC: 34 GM/DL — SIGNIFICANT CHANGE UP (ref 32–36)
MCV RBC AUTO: 90.5 FL — SIGNIFICANT CHANGE UP (ref 80–100)
PLATELET # BLD AUTO: 377 K/UL — SIGNIFICANT CHANGE UP (ref 150–400)
POTASSIUM SERPL-MCNC: 4.8 MMOL/L — SIGNIFICANT CHANGE UP (ref 3.5–5.3)
POTASSIUM SERPL-SCNC: 4.8 MMOL/L — SIGNIFICANT CHANGE UP (ref 3.5–5.3)
RBC # BLD: 2.3 M/UL — LOW (ref 3.8–5.2)
RBC # FLD: 14.2 % — SIGNIFICANT CHANGE UP (ref 10.3–14.5)
RH IG SCN BLD-IMP: NEGATIVE — SIGNIFICANT CHANGE UP
SODIUM SERPL-SCNC: 130 MMOL/L — LOW (ref 135–145)
VANCOMYCIN TROUGH SERPL-MCNC: 30.5 UG/ML — CRITICAL HIGH (ref 10–20)
WBC # BLD: 10.1 K/UL — SIGNIFICANT CHANGE UP (ref 3.8–10.5)
WBC # FLD AUTO: 10.1 K/UL — SIGNIFICANT CHANGE UP (ref 3.8–10.5)

## 2019-07-08 PROCEDURE — 99223 1ST HOSP IP/OBS HIGH 75: CPT

## 2019-07-08 PROCEDURE — 99232 SBSQ HOSP IP/OBS MODERATE 35: CPT

## 2019-07-08 PROCEDURE — 99222 1ST HOSP IP/OBS MODERATE 55: CPT

## 2019-07-08 RX ORDER — SODIUM CHLORIDE 9 MG/ML
1000 INJECTION, SOLUTION INTRAVENOUS
Refills: 0 | Status: DISCONTINUED | OUTPATIENT
Start: 2019-07-08 | End: 2019-07-08

## 2019-07-08 RX ORDER — FUROSEMIDE 40 MG
20 TABLET ORAL ONCE
Refills: 0 | Status: COMPLETED | OUTPATIENT
Start: 2019-07-08 | End: 2019-07-08

## 2019-07-08 RX ADMIN — Medication 100 MILLIGRAM(S): at 05:52

## 2019-07-08 RX ADMIN — Medication 3 MILLILITER(S): at 05:51

## 2019-07-08 RX ADMIN — Medication 100 MILLIGRAM(S): at 18:15

## 2019-07-08 RX ADMIN — Medication 1.5 MILLIGRAM(S): at 22:11

## 2019-07-08 RX ADMIN — CEFEPIME 100 MILLIGRAM(S): 1 INJECTION, POWDER, FOR SOLUTION INTRAMUSCULAR; INTRAVENOUS at 05:50

## 2019-07-08 RX ADMIN — Medication 500 MILLIGRAM(S): at 14:55

## 2019-07-08 RX ADMIN — Medication 1: at 18:22

## 2019-07-08 RX ADMIN — HEPARIN SODIUM 5000 UNIT(S): 5000 INJECTION INTRAVENOUS; SUBCUTANEOUS at 05:54

## 2019-07-08 RX ADMIN — GABAPENTIN 300 MILLIGRAM(S): 400 CAPSULE ORAL at 09:23

## 2019-07-08 RX ADMIN — MIDODRINE HYDROCHLORIDE 5 MILLIGRAM(S): 2.5 TABLET ORAL at 15:03

## 2019-07-08 RX ADMIN — SODIUM CHLORIDE 50 MILLILITER(S): 9 INJECTION, SOLUTION INTRAVENOUS at 01:48

## 2019-07-08 RX ADMIN — HEPARIN SODIUM 5000 UNIT(S): 5000 INJECTION INTRAVENOUS; SUBCUTANEOUS at 18:16

## 2019-07-08 RX ADMIN — TIZANIDINE 4 MILLIGRAM(S): 4 TABLET ORAL at 09:23

## 2019-07-08 RX ADMIN — CHLORHEXIDINE GLUCONATE 1 APPLICATION(S): 213 SOLUTION TOPICAL at 14:55

## 2019-07-08 RX ADMIN — ERGOCALCIFEROL 5000 UNIT(S): 1.25 CAPSULE ORAL at 20:25

## 2019-07-08 RX ADMIN — Medication 3 MILLILITER(S): at 00:00

## 2019-07-08 RX ADMIN — Medication 3 MILLILITER(S): at 18:16

## 2019-07-08 RX ADMIN — Medication 3 MILLILITER(S): at 23:37

## 2019-07-08 RX ADMIN — Medication 3 MILLILITER(S): at 14:55

## 2019-07-08 RX ADMIN — Medication 300 MILLIGRAM(S): at 14:55

## 2019-07-08 RX ADMIN — FAMOTIDINE 20 MILLIGRAM(S): 10 INJECTION INTRAVENOUS at 14:55

## 2019-07-08 RX ADMIN — Medication 15 MILLILITER(S): at 14:55

## 2019-07-08 RX ADMIN — GABAPENTIN 300 MILLIGRAM(S): 400 CAPSULE ORAL at 21:14

## 2019-07-08 RX ADMIN — Medication 20 MILLIGRAM(S): at 18:16

## 2019-07-08 RX ADMIN — MIDODRINE HYDROCHLORIDE 5 MILLIGRAM(S): 2.5 TABLET ORAL at 23:44

## 2019-07-08 RX ADMIN — QUETIAPINE FUMARATE 25 MILLIGRAM(S): 200 TABLET, FILM COATED ORAL at 22:12

## 2019-07-08 RX ADMIN — TIZANIDINE 4 MILLIGRAM(S): 4 TABLET ORAL at 21:14

## 2019-07-08 NOTE — CHART NOTE - NSCHARTNOTEFT_GEN_A_CORE
MYRIAM HEATH    Informed 6:30pm lab results to renal Dr. Chao [phone] last night, and K 5.6      Interventions taken  Albuterol nebs x2 with good responses and repeat K 4.9  start Bicarb IV as per renal.  cont assess and monitor.  f/u with Am team.                            Mukesh Escalera ANP-BC  Spectralink #[id number]

## 2019-07-08 NOTE — PROGRESS NOTE ADULT - SUBJECTIVE AND OBJECTIVE BOX
MYRIAM WHITE:[id number],   69yFemale followed for:  ASA; dye contrast (Anaphylaxis)  aspirin (Short breath)  divalproex sodium (Other (Unknown))  Flowers and Plants (Short breath)  Haldol (Other (Unknown))  penicillin (Short breath; Rash)  sulfa drugs (Short breath; Rash)  vancomycin (Rash; Urticaria; Hives)  Xanax (Other (Unknown))    PAST MEDICAL & SURGICAL HISTORY:  Type 2 diabetes mellitus  Dementia  DVT, lower extremity  CVA (cerebral vascular accident)  Fatty pancreas  PNA (pneumonia)  Pulmonary HTN  IGT (impaired glucose tolerance)  Ulcerative colitis  Acid reflux  Anxiety  Depression  Mouth sores  HLD (hyperlipidemia)  Asthma  S/P percutaneous endoscopic gastrostomy (PEG) tube placement: for dysphagia  Humeral head fracture  H/O: hysterectomy  H/O cataract extraction, left  History of knee replacement    FAMILY HISTORY:  Family history of dementia (Grandparent)  Family history of colon cancer (Grandparent)    MEDICATIONS  (STANDING):  ALBUTerol    90 MICROgram(s) HFA Inhaler 1 Puff(s) Inhalation every 4 hours  ALBUTerol/ipratropium for Nebulization 3 milliLiter(s) Nebulizer every 6 hours  ALBUTerol/ipratropium for Nebulization. 3 milliLiter(s) Nebulizer four times a day  ascorbic acid 500 milliGRAM(s) Oral daily  buDESOnide  80 MICROgram(s)/formoterol 4.5 MICROgram(s) Inhaler 2 Puff(s) Inhalation two times a day  cefepime   IVPB 1000 milliGRAM(s) IV Intermittent every 24 hours  chlorhexidine 2% Cloths 1 Application(s) Topical daily  clonazePAM  Tablet 1.5 milliGRAM(s) Oral at bedtime  dextrose 5% + sodium chloride 0.9%. 1000 milliLiter(s) (50 mL/Hr) IV Continuous <Continuous>  dextrose 5%. 1000 milliLiter(s) (50 mL/Hr) IV Continuous <Continuous>  dextrose 50% Injectable 12.5 Gram(s) IV Push once  dextrose 50% Injectable 25 Gram(s) IV Push once  dextrose 50% Injectable 25 Gram(s) IV Push once  diphenhydrAMINE   Injectable 25 milliGRAM(s) IV Push two times a day  ergocalciferol Drops 5000 Unit(s) Oral daily  famotidine    Tablet 20 milliGRAM(s) Oral daily  ferrous    sulfate Liquid 300 milliGRAM(s) Enteral Tube daily  gabapentin 300 milliGRAM(s) Oral two times a day  heparin  Injectable 5000 Unit(s) SubCutaneous every 12 hours  insulin lispro (HumaLOG) corrective regimen sliding scale   SubCutaneous every 6 hours  Medical Marijuana Awake Oil 2 milliLiter(s) 2 milliLiter(s) Enteral Tube <User Schedule>  Medical Marijuana Calm Oil 2 milliLiter(s) 2 milliLiter(s) Enteral Tube <User Schedule>  Medical Marijuana [place] Oil 2 milliLiter(s) 2 milliLiter(s) Enteral Tube <User Schedule>  metroNIDAZOLE  IVPB 500 milliGRAM(s) IV Intermittent every 12 hours  midodrine 5 milliGRAM(s) Oral every 8 hours  multivitamin/minerals/iron Oral Solution (CENTRUM) 15 milliLiter(s) Oral daily  QUEtiapine 25 milliGRAM(s) Oral daily  sodium chloride 0.45% 1000 milliLiter(s) (50 mL/Hr) IV Continuous <Continuous>  sodium chloride 0.9%. 1000 milliLiter(s) (50 mL/Hr) IV Continuous <Continuous>  tiotropium 18 MICROgram(s) Capsule 1 Capsule(s) Inhalation daily  tiotropium 18 MICROgram(s) Capsule 1 Capsule(s) Inhalation daily  tiZANidine 4 milliGRAM(s) Oral <User Schedule>  zinc sulfate 220 milliGRAM(s) Oral daily    MEDICATIONS  (PRN):  acetaminophen  Suppository .. 650 milliGRAM(s) Rectal every 6 hours PRN Temp greater or equal to 38C (100.4F), Mild Pain (1 - 3)  carboxymethylcellulose 0.5% (Preservative-Free) Ophthalmic Solution 1 Drop(s) Both EYES three times a day PRN dry eyes  dextrose 40% Gel 15 Gram(s) Oral once PRN Blood Glucose LESS THAN 70 milliGRAM(s)/deciliter  glucagon  Injectable 1 milliGRAM(s) IntraMuscular once PRN Glucose LESS THAN 70 milligrams/deciliter  nystatin Powder 1 Application(s) Topical three times a day PRN under breasts      Vital Signs Last 24 Hrs  T(C): 36.7 (08 Jul 2019 06:10), Max: 37 (07 Jul 2019 11:45)  T(F): 98.1 (08 Jul 2019 06:10), Max: 98.6 (07 Jul 2019 11:45)  HR: 102 (08 Jul 2019 06:10) (90 - 106)  BP: 137/85 (08 Jul 2019 06:10) (105/54 - 147/79)  BP(mean): --  RR: 18 (08 Jul 2019 06:10) (18 - 18)  SpO2: 100% (08 Jul 2019 06:10) (94% - 100%)  nc/at  s1s2  cta  soft, nt, nd no guarding or rebound  no c/c/e    CBC Full  -  ( 07 Jul 2019 06:10 )  WBC Count : 10.8 K/uL  RBC Count : 2.48 M/uL  Hemoglobin : 7.8 g/dL  Hematocrit : 22.6 %  Platelet Count - Automated : 427 K/uL  Mean Cell Volume : 91.3 fl  Mean Cell Hemoglobin : 31.4 pg  Mean Cell Hemoglobin Concentration : 34.4 gm/dL  Auto Neutrophil # : 8.2 K/uL  Auto Lymphocyte # : 0.7 K/uL  Auto Monocyte # : 1.0 K/uL  Auto Eosinophil # : 0.8 K/uL  Auto Basophil # : 0.0 K/uL  Auto Neutrophil % : 76.4 %  Auto Lymphocyte % : 6.2 %  Auto Monocyte % : 9.7 %  Auto Eosinophil % : 7.6 %  Auto Basophil % : 0.2 %    07-07    127<L>  |  95<L>  |  76<H>  ----------------------------<  117<H>  5.6<H>   |  17<L>  |  1.65<H>    Ca    8.9      07 Jul 2019 18:30

## 2019-07-08 NOTE — CONSULT NOTE ADULT - SUBJECTIVE AND OBJECTIVE BOX
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  thinks she must have pulled it out this morning. He says otherwise she was in her usual state of health at home. He says she has had some intermittent low grade fevers at home, which usually respond to Tylenol.   He says her Gavin was due to be changed early 2019.   Last dose of Eliquis was 19 8pm.   In ED patient received 1LNS and cefepime. (2019 15:39)      s/p IR exchange and replacement of G tube under fluoro guidance  Pt chronically debilitated with contractures, bedbound with functional quadriplegia. chronic gavin multiple decubiti, non verbal with advanced dementia. cared for by spouse and 2 daughters as well as 2 aides at home    Has had RRTs this admission for respiratory status  now also in acute renal failure  Has had history of CHF - requiring Lasix for diuresis as per spouse  She is on bolus G tube feeds - does not tolerate continuous G tube feeding 2/2 muscle spasms related to being upright >30 degrees in bed position continuously as per spouse    Spouse reports he suctions pt regularly  reported leakage from PEG site, no bleeding. has had some refluxing of tube feeds within tubing as well. no vomiting +fever and RRT over weekend for suspected aspiration event    Listed PMH reports UC but unclear, no reported IBD as per family prior to current debilitated state (prior chart history reviewed as well)  +FH colon cancer (mother)    PAST MEDICAL & SURGICAL HISTORY:  Type 2 diabetes mellitus  Dementia  DVT, lower extremity  CVA (cerebral vascular accident)  Fatty pancreas  PNA (pneumonia)  Pulmonary HTN  IGT (impaired glucose tolerance)  Ulcerative colitis  Acid reflux  Anxiety  Depression  Mouth sores  HLD (hyperlipidemia)  Asthma  S/P percutaneous endoscopic gastrostomy (PEG) tube placement: for dysphagia  Humeral head fracture  H/O: hysterectomy  H/O cataract extraction, left  History of knee replacement      Allergies    ASA; dye contrast (Anaphylaxis)  aspirin (Short breath)  divalproex sodium (Other (Unknown))  Flowers and Plants (Short breath)  Haldol (Other (Unknown))  penicillin (Short breath; Rash)  sulfa drugs (Short breath; Rash)  vancomycin (Rash; Urticaria; Hives)  Xanax (Other (Unknown))      MEDICATIONS  (STANDING):  ALBUTerol    90 MICROgram(s) HFA Inhaler 1 Puff(s) Inhalation every 4 hours  ALBUTerol/ipratropium for Nebulization 3 milliLiter(s) Nebulizer every 6 hours  ALBUTerol/ipratropium for Nebulization. 3 milliLiter(s) Nebulizer four times a day  ascorbic acid 500 milliGRAM(s) Oral daily  buDESOnide  80 MICROgram(s)/formoterol 4.5 MICROgram(s) Inhaler 2 Puff(s) Inhalation two times a day  cefepime   IVPB 1000 milliGRAM(s) IV Intermittent every 24 hours  chlorhexidine 2% Cloths 1 Application(s) Topical daily  clonazePAM  Tablet 1.5 milliGRAM(s) Oral at bedtime  dextrose 5% + sodium chloride 0.9%. 1000 milliLiter(s) (50 mL/Hr) IV Continuous <Continuous>  dextrose 5%. 1000 milliLiter(s) (50 mL/Hr) IV Continuous <Continuous>  dextrose 50% Injectable 12.5 Gram(s) IV Push once  dextrose 50% Injectable 25 Gram(s) IV Push once  dextrose 50% Injectable 25 Gram(s) IV Push once  diphenhydrAMINE   Injectable 25 milliGRAM(s) IV Push two times a day  ergocalciferol Drops 5000 Unit(s) Oral daily  famotidine    Tablet 20 milliGRAM(s) Oral daily  ferrous    sulfate Liquid 300 milliGRAM(s) Enteral Tube daily  gabapentin 300 milliGRAM(s) Oral two times a day  heparin  Injectable 5000 Unit(s) SubCutaneous every 12 hours  insulin lispro (HumaLOG) corrective regimen sliding scale   SubCutaneous every 6 hours  Medical Marijuana Awake Oil 2 milliLiter(s) 2 milliLiter(s) Enteral Tube <User Schedule>  Medical Marijuana Calm Oil 2 milliLiter(s) 2 milliLiter(s) Enteral Tube <User Schedule>  Medical Marijuana [place] Oil 2 milliLiter(s) 2 milliLiter(s) Enteral Tube <User Schedule>  metroNIDAZOLE  IVPB 500 milliGRAM(s) IV Intermittent every 12 hours  midodrine 5 milliGRAM(s) Oral every 8 hours  multivitamin/minerals/iron Oral Solution (CENTRUM) 15 milliLiter(s) Oral daily  QUEtiapine 25 milliGRAM(s) Oral daily  sodium chloride 0.45% 1000 milliLiter(s) (50 mL/Hr) IV Continuous <Continuous>  sodium chloride 0.9%. 1000 milliLiter(s) (50 mL/Hr) IV Continuous <Continuous>  tiotropium 18 MICROgram(s) Capsule 1 Capsule(s) Inhalation daily  tiotropium 18 MICROgram(s) Capsule 1 Capsule(s) Inhalation daily  tiZANidine 4 milliGRAM(s) Oral <User Schedule>  zinc sulfate 220 milliGRAM(s) Oral daily    MEDICATIONS  (PRN):  acetaminophen  Suppository .. 650 milliGRAM(s) Rectal every 6 hours PRN Temp greater or equal to 38C (100.4F), Mild Pain (1 - 3)  carboxymethylcellulose 0.5% (Preservative-Free) Ophthalmic Solution 1 Drop(s) Both EYES three times a day PRN dry eyes  dextrose 40% Gel 15 Gram(s) Oral once PRN Blood Glucose LESS THAN 70 milliGRAM(s)/deciliter  glucagon  Injectable 1 milliGRAM(s) IntraMuscular once PRN Glucose LESS THAN 70 milligrams/deciliter  nystatin Powder 1 Application(s) Topical three times a day PRN under breasts      Social History:    Marital Status:  ( X  )    (   ) Single    (   )    (  )   Lives with: (  ) alone  (  ) children   ( X ) spouse   (  ) parents  ( X ) other- home aides    no tobacco, ETOH or drug use    Family History   IBD (  ) Yes   (X  ) No  GI Malignancy ( X )  Yes - mother ()   (  ) No      Advanced Directives: (  X   ) None    (      ) DNR    (     ) DNI    (     ) Health Care Proxy:     Review of Systems:    unable to obtain from pt. History from chart review and discussion with team/spouse      Vital Signs Last 24 Hrs  T(C): 36.7 (2019 06:10), Max: 37 (2019 11:45)  T(F): 98.1 (2019 06:10), Max: 98.6 (2019 11:45)  HR: 102 (2019 06:10) (90 - 106)  BP: 137/85 (2019 06:10) (105/54 - 147/79)  BP(mean): --  RR: 18 (2019 06:10) (18 - 18)  SpO2: 100% (2019 06:10) (94% - 100%)    PHYSICAL EXAM:    Constitutional: frail elderly chronically ill appearing +severe contractures in semi-fetal positioning, non verbal  opens eyes.   Neck: No JVD  Respiratory: occ rhonchi , decreased BS bases  Cardiovascular: S1 and S2 tachy  Gastrointestinal: BS+, soft, +G tube  high in epigastric area  bumper noted to be at 10 cm richard and leakage of feeds from G tube stoma with many pieces of drain sponge gauze at stoma. area cleaned by me , no bleeding. bumper brought down to skin (between 2 and 4 cm richard on tubing). lightly touching skin and spins freely 360 degrees. no further leakage.   Extremities: +edema +dressings in place  Vascular: 2+ peripheral pulses  Neurological: opens eyes  Psychiatric: non verbal   Skin: anicteric  +skin dressing and heel pad cushions in place        LABS:                        7.1    10.1  )-----------( 377      ( 2019 10:24 )             20.8     07-08    130<L>  |  98  |  76<H>  ----------------------------<  124<H>  4.8   |  21<L>  |  1.84<H>    Ca    9.2      2019 10:24    Vancomycin Level, Random (19 @ 13:06)    Vancomycin Level, Random: 33.6: The "VANCOMYCIN, RANDOM" test should only be ordered for patients with  severe renal dysfunction or on dialysis. Therapeutic ranges have not been  established and results must be interpreted in the context of patient's  clinical condition.  NOTE: Therapeutic range for Trough Vancomycin is 10-20 mcg/mL. ug/mL        Urinalysis Basic - ( 2019 19:17 )    Color: Yellow / Appearance: Cloudy / S.017 / pH: x  Gluc: x / Ketone: Trace  / Bili: Negative / Urobili: Negative   Blood: x / Protein: 300 mg/dL / Nitrite: Negative   Leuk Esterase: Large / RBC: 10 /hpf / WBC >50   Sq Epi: x / Non Sq Epi: 0 / Bacteria: Moderate    RADIOLOGY & ADDITIONAL TESTS:  < from: Xray Chest 1 View- PORTABLE-Urgent (19 @ 20:01) >    Indication: Shortness of breath.    Impression:    The heart is enlarged. The lungs appear to be clear.. Left upper lobe   cannot be evaluated due to the head that obscuring it.      < from: CT Abdomen and Pelvis w/ Oral Cont (19 @ 19:34) >  FINDINGS:    Evaluation of the solid organs and vessels limited without use of IV   contrast.    CHEST:     LUNGS AND LARGE AIRWAYS: Airway impaction of the left main bronchus and   segmental bronchi with collapse of the lingula and left lower lobes.   PLEURA: Small left pleural effusion. No pneumothorax.  VESSELS: Atherosclerotic changes.  HEART: Heart size is normal. No pericardial effusion. Coronary artery   calcifications.  MEDIASTINUM AND DILLAN: No lymphadenopathy. Mildly dilated esophagus with   internal debris.  CHEST WALL AND LOWER NECK: Within normal limits.    ABDOMEN AND PELVIS:    LIVER: Within normal limits.  BILE DUCTS: Normal caliber.  GALLBLADDER: Cholecystectomy.  SPLEEN: Within normal limits.  PANCREAS: Fatty atrophy..  ADRENALS: Within normal limits.  KIDNEYS/URETERS: Within normal limits.    BLADDER: Collapsed around a Gavin catheter.  REPRODUCTIVE ORGANS: Hysterectomy.    BOWEL: PEG tube with tip in the stomach. Mild soft tissue thickening   along the tractof the tube in the anterior abdominal wall without   associated collection. No bowel obstruction. Appendix is normal.  PERITONEUM: No ascites.  VESSELS:  Atherosclerotic changes.  RETROPERITONEUM: No lymphadenopathy.    ABDOMINAL WALL: Anasarca. Large sacral decubitus ulcer.  BONES: Scoliosis with degenerative changes. Chronic severe compression   deformity of L2 with retropulsion of bony fragments. Right hip   replacement with partial dislocation, unchanged. Heterogeneous sclerosis   of the sacrum raising concern for osteomyelitis    IMPRESSION:     Impacted left sided airways with collapse of the lingula and left lower   lobe. Underlying pneumonia is not excluded.    Large sacral decubitus ulceration with concern for sacral osteomyelitis.    < from: IR Procedure (19 @ 14:56) >    INTERPRETATION:  Clinical data: Dysphagia with eroding gastrostomy tube   with buried bumper for exchange    Procedure: Following informed consent the patient was placed supine on   the fluoroscopic table. Continuous hemodynamic monitoring was performed.   The procedure was performed with the Department of anesthesiology. The   indwelling gastrostomy tube was prepped and draped in sterile fashion.   Contrast was injected into the gastrostomy tube demonstrating the bumper   to reside within the subcutaneous tissues with the tract to the stomach   appreciated and filling into the stomach. Following administration of 2%   lidocaine solution the gastrostomy tube was exchanged over a guidewire   under fluoroscopic guidance for a 22 Syriac gastrostomy tube which was   placed with its tip in the stomach. The retention balloon was inflated   and withdrawn to the gastric entry site. The catheter was fixed   externally using a plastic disc. A sterile dressing was applied. The   patient tolerated the procedure without complication.    Impression: Exchange and upsizing of gastrostomy tube using fluoroscopic   guidance as above. 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  thinks she must have pulled it out this morning. He says otherwise she was in her usual state of health at home. He says she has had some intermittent low grade fevers at home, which usually respond to Tylenol.   He says her Gavin was due to be changed early 2019.   Last dose of Eliquis was 19 8pm.   In ED patient received 1LNS and cefepime. (2019 15:39)      s/p IR exchange and replacement of G tube under fluoro guidance  Pt chronically debilitated with contractures, bedbound with functional quadriplegia. chronic gavin multiple decubiti, non verbal with advanced dementia. cared for by spouse and 2 daughters as well as 2 aides at home    Has had RRTs this admission for respiratory status  now also in acute renal failure  Has had history of CHF - requiring Lasix for diuresis as per spouse  She is on bolus G tube feeds - does not tolerate continuous G tube feeding 2/2 muscle spasms related to being upright >30 degrees in bed position continuously as per spouse    Spouse reports he suctions pt regularly  reported leakage from PEG site, no bleeding. has had some refluxing of tube feeds within tubing as well. no vomiting +fever and RRT over weekend for suspected aspiration event    Listed PMH reports UC but unclear, no reported IBD as per family prior to current debilitated state (prior chart history reviewed as well)  +FH colon cancer (mother)    PAST MEDICAL & SURGICAL HISTORY:  Type 2 diabetes mellitus  Dementia  DVT, lower extremity  CVA (cerebral vascular accident)  Fatty pancreas  PNA (pneumonia)  Pulmonary HTN  IGT (impaired glucose tolerance)  Ulcerative colitis  Acid reflux  Anxiety  Depression  Mouth sores  HLD (hyperlipidemia)  Asthma  S/P percutaneous endoscopic gastrostomy (PEG) tube placement: for dysphagia  Humeral head fracture  H/O: hysterectomy  H/O cataract extraction, left  History of knee replacement      Allergies    ASA; dye contrast (Anaphylaxis)  aspirin (Short breath)  divalproex sodium (Other (Unknown))  Flowers and Plants (Short breath)  Haldol (Other (Unknown))  penicillin (Short breath; Rash)  sulfa drugs (Short breath; Rash)  vancomycin (Rash; Urticaria; Hives)  Xanax (Other (Unknown))      MEDICATIONS  (STANDING):  ALBUTerol    90 MICROgram(s) HFA Inhaler 1 Puff(s) Inhalation every 4 hours  ALBUTerol/ipratropium for Nebulization 3 milliLiter(s) Nebulizer every 6 hours  ALBUTerol/ipratropium for Nebulization. 3 milliLiter(s) Nebulizer four times a day  ascorbic acid 500 milliGRAM(s) Oral daily  buDESOnide  80 MICROgram(s)/formoterol 4.5 MICROgram(s) Inhaler 2 Puff(s) Inhalation two times a day  cefepime   IVPB 1000 milliGRAM(s) IV Intermittent every 24 hours  chlorhexidine 2% Cloths 1 Application(s) Topical daily  clonazePAM  Tablet 1.5 milliGRAM(s) Oral at bedtime  dextrose 5% + sodium chloride 0.9%. 1000 milliLiter(s) (50 mL/Hr) IV Continuous <Continuous>  dextrose 5%. 1000 milliLiter(s) (50 mL/Hr) IV Continuous <Continuous>  dextrose 50% Injectable 12.5 Gram(s) IV Push once  dextrose 50% Injectable 25 Gram(s) IV Push once  dextrose 50% Injectable 25 Gram(s) IV Push once  diphenhydrAMINE   Injectable 25 milliGRAM(s) IV Push two times a day  ergocalciferol Drops 5000 Unit(s) Oral daily  famotidine    Tablet 20 milliGRAM(s) Oral daily  ferrous    sulfate Liquid 300 milliGRAM(s) Enteral Tube daily  gabapentin 300 milliGRAM(s) Oral two times a day  heparin  Injectable 5000 Unit(s) SubCutaneous every 12 hours  insulin lispro (HumaLOG) corrective regimen sliding scale   SubCutaneous every 6 hours  Medical Marijuana Awake Oil 2 milliLiter(s) 2 milliLiter(s) Enteral Tube <User Schedule>  Medical Marijuana Calm Oil 2 milliLiter(s) 2 milliLiter(s) Enteral Tube <User Schedule>  Medical Marijuana [place] Oil 2 milliLiter(s) 2 milliLiter(s) Enteral Tube <User Schedule>  metroNIDAZOLE  IVPB 500 milliGRAM(s) IV Intermittent every 12 hours  midodrine 5 milliGRAM(s) Oral every 8 hours  multivitamin/minerals/iron Oral Solution (CENTRUM) 15 milliLiter(s) Oral daily  QUEtiapine 25 milliGRAM(s) Oral daily  sodium chloride 0.45% 1000 milliLiter(s) (50 mL/Hr) IV Continuous <Continuous>  sodium chloride 0.9%. 1000 milliLiter(s) (50 mL/Hr) IV Continuous <Continuous>  tiotropium 18 MICROgram(s) Capsule 1 Capsule(s) Inhalation daily  tiotropium 18 MICROgram(s) Capsule 1 Capsule(s) Inhalation daily  tiZANidine 4 milliGRAM(s) Oral <User Schedule>  zinc sulfate 220 milliGRAM(s) Oral daily    MEDICATIONS  (PRN):  acetaminophen  Suppository .. 650 milliGRAM(s) Rectal every 6 hours PRN Temp greater or equal to 38C (100.4F), Mild Pain (1 - 3)  carboxymethylcellulose 0.5% (Preservative-Free) Ophthalmic Solution 1 Drop(s) Both EYES three times a day PRN dry eyes  dextrose 40% Gel 15 Gram(s) Oral once PRN Blood Glucose LESS THAN 70 milliGRAM(s)/deciliter  glucagon  Injectable 1 milliGRAM(s) IntraMuscular once PRN Glucose LESS THAN 70 milligrams/deciliter  nystatin Powder 1 Application(s) Topical three times a day PRN under breasts      Social History:    Marital Status:  ( X  )    (   ) Single    (   )    (  )   Lives with: (  ) alone  (  ) children   ( X ) spouse   (  ) parents  ( X ) other- home aides    no tobacco, ETOH or drug use    Family History   IBD (  ) Yes   (X  ) No  GI Malignancy ( X )  Yes - mother ()   (  ) No      Advanced Directives: (  X   ) None    (      ) DNR    (     ) DNI    (     ) Health Care Proxy:     Review of Systems:    unable to obtain from pt. History from chart review and discussion with team/spouse      Vital Signs Last 24 Hrs  T(C): 36.7 (2019 06:10), Max: 37 (2019 11:45)  T(F): 98.1 (2019 06:10), Max: 98.6 (2019 11:45)  HR: 102 (2019 06:10) (90 - 106)  BP: 137/85 (2019 06:10) (105/54 - 147/79)  BP(mean): --  RR: 18 (2019 06:10) (18 - 18)  SpO2: 100% (2019 06:10) (94% - 100%)    PHYSICAL EXAM:    Constitutional: frail elderly chronically ill appearing +severe contractures in semi-fetal positioning, non verbal  opens eyes.   Neck: No JVD  Respiratory: occ rhonchi , decreased BS bases  Cardiovascular: S1 and S2 tachy  Gastrointestinal: BS+, soft, +G tube  high in epigastric area  bumper noted to be at 10 cm richard and leakage of feeds from G tube stoma with many pieces of drain sponge gauze at stoma. Area cleaned by me , no bleeding. bumper brought down to skin (between 2 and 4 cm richard on tubing). lightly touching skin and spins freely 360 degrees. no further leakage.   Extremities: +edema +dressings in place  Vascular: 2+ peripheral pulses  Neurological: opens eyes  Psychiatric: non verbal   Skin: anicteric  +skin dressing and heel pad cushions in place        LABS:                        7.1    10.1  )-----------( 377      ( 2019 10:24 )             20.8     07-08    130<L>  |  98  |  76<H>  ----------------------------<  124<H>  4.8   |  21<L>  |  1.84<H>    Ca    9.2      2019 10:24    Vancomycin Level, Random (19 @ 13:06)    Vancomycin Level, Random: 33.6: The "VANCOMYCIN, RANDOM" test should only be ordered for patients with  severe renal dysfunction or on dialysis. Therapeutic ranges have not been  established and results must be interpreted in the context of patient's  clinical condition.  NOTE: Therapeutic range for Trough Vancomycin is 10-20 mcg/mL. ug/mL        Urinalysis Basic - ( 2019 19:17 )    Color: Yellow / Appearance: Cloudy / S.017 / pH: x  Gluc: x / Ketone: Trace  / Bili: Negative / Urobili: Negative   Blood: x / Protein: 300 mg/dL / Nitrite: Negative   Leuk Esterase: Large / RBC: 10 /hpf / WBC >50   Sq Epi: x / Non Sq Epi: 0 / Bacteria: Moderate    RADIOLOGY & ADDITIONAL TESTS:  < from: Xray Chest 1 View- PORTABLE-Urgent (19 @ 20:01) >    Indication: Shortness of breath.    Impression:    The heart is enlarged. The lungs appear to be clear.. Left upper lobe   cannot be evaluated due to the head that obscuring it.      < from: CT Abdomen and Pelvis w/ Oral Cont (19 @ 19:34) >  FINDINGS:    Evaluation of the solid organs and vessels limited without use of IV   contrast.    CHEST:     LUNGS AND LARGE AIRWAYS: Airway impaction of the left main bronchus and   segmental bronchi with collapse of the lingula and left lower lobes.   PLEURA: Small left pleural effusion. No pneumothorax.  VESSELS: Atherosclerotic changes.  HEART: Heart size is normal. No pericardial effusion. Coronary artery   calcifications.  MEDIASTINUM AND DILLAN: No lymphadenopathy. Mildly dilated esophagus with   internal debris.  CHEST WALL AND LOWER NECK: Within normal limits.    ABDOMEN AND PELVIS:    LIVER: Within normal limits.  BILE DUCTS: Normal caliber.  GALLBLADDER: Cholecystectomy.  SPLEEN: Within normal limits.  PANCREAS: Fatty atrophy..  ADRENALS: Within normal limits.  KIDNEYS/URETERS: Within normal limits.    BLADDER: Collapsed around a Gavin catheter.  REPRODUCTIVE ORGANS: Hysterectomy.    BOWEL: PEG tube with tip in the stomach. Mild soft tissue thickening   along the tractof the tube in the anterior abdominal wall without   associated collection. No bowel obstruction. Appendix is normal.  PERITONEUM: No ascites.  VESSELS:  Atherosclerotic changes.  RETROPERITONEUM: No lymphadenopathy.    ABDOMINAL WALL: Anasarca. Large sacral decubitus ulcer.  BONES: Scoliosis with degenerative changes. Chronic severe compression   deformity of L2 with retropulsion of bony fragments. Right hip   replacement with partial dislocation, unchanged. Heterogeneous sclerosis   of the sacrum raising concern for osteomyelitis    IMPRESSION:     Impacted left sided airways with collapse of the lingula and left lower   lobe. Underlying pneumonia is not excluded.    Large sacral decubitus ulceration with concern for sacral osteomyelitis.    < from: IR Procedure (19 @ 14:56) >    INTERPRETATION:  Clinical data: Dysphagia with eroding gastrostomy tube   with buried bumper for exchange    Procedure: Following informed consent the patient was placed supine on   the fluoroscopic table. Continuous hemodynamic monitoring was performed.   The procedure was performed with the Department of anesthesiology. The   indwelling gastrostomy tube was prepped and draped in sterile fashion.   Contrast was injected into the gastrostomy tube demonstrating the bumper   to reside within the subcutaneous tissues with the tract to the stomach   appreciated and filling into the stomach. Following administration of 2%   lidocaine solution the gastrostomy tube was exchanged over a guidewire   under fluoroscopic guidance for a 22 [place] gastrostomy tube which was   placed with its tip in the stomach. The retention balloon was inflated   and withdrawn to the gastric entry site. The catheter was fixed   externally using a plastic disc. A sterile dressing was applied. The   patient tolerated the procedure without complication.    Impression: Exchange and upsizing of gastrostomy tube using fluoroscopic   guidance as above.

## 2019-07-08 NOTE — PROGRESS NOTE ADULT - SUBJECTIVE AND OBJECTIVE BOX
NEPHROLOGY-[facility] [phone]        Patient seen and examined         MEDICATIONS  (STANDING):  ALBUTerol    90 MICROgram(s) HFA Inhaler 1 Puff(s) Inhalation every 4 hours  ALBUTerol/ipratropium for Nebulization 3 milliLiter(s) Nebulizer every 6 hours  ALBUTerol/ipratropium for Nebulization. 3 milliLiter(s) Nebulizer four times a day  ascorbic acid 500 milliGRAM(s) Oral daily  buDESOnide  80 MICROgram(s)/formoterol 4.5 MICROgram(s) Inhaler 2 Puff(s) Inhalation two times a day  cefepime   IVPB 1000 milliGRAM(s) IV Intermittent every 24 hours  chlorhexidine 2% Cloths 1 Application(s) Topical daily  clonazePAM  Tablet 1.5 milliGRAM(s) Oral at bedtime  dextrose 5%. 1000 milliLiter(s) (50 mL/Hr) IV Continuous <Continuous>  dextrose 50% Injectable 12.5 Gram(s) IV Push once  dextrose 50% Injectable 25 Gram(s) IV Push once  dextrose 50% Injectable 25 Gram(s) IV Push once  diphenhydrAMINE   Injectable 25 milliGRAM(s) IV Push two times a day  ergocalciferol Drops 5000 Unit(s) Oral daily  famotidine    Tablet 20 milliGRAM(s) Oral daily  ferrous    sulfate Liquid 300 milliGRAM(s) Enteral Tube daily  gabapentin 300 milliGRAM(s) Oral two times a day  heparin  Injectable 5000 Unit(s) SubCutaneous every 12 hours  insulin lispro (HumaLOG) corrective regimen sliding scale   SubCutaneous every 6 hours  Medical Marijuana Awake Oil 2 milliLiter(s) 2 milliLiter(s) Enteral Tube <User Schedule>  Medical Marijuana Calm Oil 2 milliLiter(s) 2 milliLiter(s) Enteral Tube <User Schedule>  Medical Marijuana [place] Oil 2 milliLiter(s) 2 milliLiter(s) Enteral Tube <User Schedule>  metroNIDAZOLE  IVPB 500 milliGRAM(s) IV Intermittent every 12 hours  midodrine 5 milliGRAM(s) Oral every 8 hours  multivitamin/minerals/iron Oral Solution (CENTRUM) 15 milliLiter(s) Oral daily  QUEtiapine 25 milliGRAM(s) Oral daily  sodium chloride 0.9%. 1000 milliLiter(s) (50 mL/Hr) IV Continuous <Continuous>  tiotropium 18 MICROgram(s) Capsule 1 Capsule(s) Inhalation daily  tiotropium 18 MICROgram(s) Capsule 1 Capsule(s) Inhalation daily  tiZANidine 4 milliGRAM(s) Oral <User Schedule>  zinc sulfate 220 milliGRAM(s) Oral daily      VITAL:  T(C): , Max: 36.9 (19 @ 21:34)  T(F): , Max: 98.4 (19 @ 21:34)  HR: 102 (19 @ 06:10)  BP: 137/85 (19 @ 06:10)  BP(mean): --  RR: 18 (19 @ 06:10)  SpO2: 100% (19 @ 06:10)  Wt(kg): --    I and O's:     @ 07:01  -   @ 07:00  --------------------------------------------------------  IN: 1000 mL / OUT: 850 mL / NET: 150 mL          PHYSICAL EXAM:    Constitutional: NAD  Neck:  No JVD  Respiratory: CTAB/L  Cardiovascular: S1 and S2  Gastrointestinal: BS+, soft, NT/ND  Extremities: No peripheral edema  Neurological: A/O x 3, no focal deficits  Psychiatric: Normal mood, normal affect  : No Blackman  Skin: No rashes  Access: Not applicable    LABS:                        7.1    10.1  )-----------( 377      ( 2019 10:24 )             20.8     07-08    130<L>  |  98  |  76<H>  ----------------------------<  124<H>  4.8   |  21<L>  |  1.84<H>    Ca    9.2      2019 10:24            Urine Studies:  Urinalysis Basic - ( 2019 19:17 )    Color: Yellow / Appearance: Cloudy / S.017 / pH: x  Gluc: x / Ketone: Trace  / Bili: Negative / Urobili: Negative   Blood: x / Protein: 300 mg/dL / Nitrite: Negative   Leuk Esterase: Large / RBC: 10 /hpf / WBC >50   Sq Epi: x / Non Sq Epi: 0 / Bacteria: Moderate      Osmolality, Random Urine: 269 mos/kg ( @ 19:17)  Sodium, Random Urine: <20 mmol/L ( @ 19:17)  Potassium, Random Urine: 57 mmol/L ( @ 19:17)  Chloride, Random Urine: <35 mmol/L ( @ 19:17)  Creatinine, Random Urine: 20 mg/dL ( @ 19:17)        RADIOLOGY & ADDITIONAL STUDIES:

## 2019-07-08 NOTE — PROGRESS NOTE ADULT - ASSESSMENT
events noted.  can continue tube feeds, when stable with aspiraiton precautions. will not tolerate jejunal tube through gastrostomy, will clog.  pt with poor prognosis, bedbound and contracted.  family wishes appreciated. all services appreciated

## 2019-07-08 NOTE — CHART NOTE - NSCHARTNOTEFT_GEN_A_CORE
Pt with decreased Hgb/Hct 7.1/20.8. Pt without overt bleeding.   Reviewed CBC with Dr. Bolden. per Dr. Bolden: transfuse 1 unit PRBC. Diuresis per Renal.  D/w Renal Dr. Ramos who recommends to stop bicarb drip and IVF. Administer Lasix 20mg IVp after blood transfusion. Pt has tolerated lasix in the past.  Change tube feeding to Nepro .       Ritu Morataya, [facility] [address]

## 2019-07-08 NOTE — CONSULT NOTE ADULT - ASSESSMENT
Patient is 69 year-old woman with advanced dementia and functional quadriplegia presents with fevers, leukocytosis in the setting of PEG dislodgement. Patient with acute kidney injury that is likely multifactorial.   Patient appears volume overloaded but is not in decompensated heart failure - respirations are unlabored and patient is breathing comfortably with nasal cannula only.    Antibiotics per ID.  PEG management and feeds per GI/nutrition.    Appreciate nephrology input:  Agree with transfusion of PRBC with Lasix. Loop diuretic will address mild hyperkalemia as well.  Keep O > I, K > 4.0, Mag > 2.0.  Daily labs.

## 2019-07-08 NOTE — CONSULT NOTE ADULT - ASSESSMENT
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 ED  with PEG tube being dislodged. Since admission, went to IR to have PEG replaced (6/25/19)  +fever and RRT over weekend for suspected aspiration event, and with Renal Insufficiency      RECS:  -keep G tube bumper at ~3 cm (between 2 and 4 cm richard on tube)  -Per discussion with Medicine and Renal, plan to change TF to Nepro.  This is a lower volume formula which may be better for pt (lower volume bolus feeds)  -Discussed option of GJ tube but explained high risk of clogging and cannot do bolus feeds and pt would still be at risk for aspiration from secretions as she is presently with G tube (spouse does not want to explore GJ conversion option)  -per discussion with spouse, primary goal is to keep pt comfortable but wants to pursue all avenues of medical treatment  -Abx as per ID  -monitor BMs  -Continue Pepcid  -Wound care  -Pulmonary following      Lengthy discussion with spouse at bedside, all questions answered  Discussed with Medicine NP  Dr Yan updated, pt's family wishes us to assume GI care    Thank you for the courtesy of this consult.    Adam Miller PA-C    [facility]  [phone]  After hours and weekend coverage [phone]

## 2019-07-08 NOTE — PROGRESS NOTE ADULT - ASSESSMENT
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 since admission, went to IR to have PEG replaced.  TRANG and hyperkalemia.    1. TRANG likely multifactorial from prerenal azotemia to now ischemic ATN from vancomycin.    2. Hyponatremia from TRANG  3. Nephrotic   4. H yperkalemia secondary to TRANG and feeds  5. Anemia.     RECOMMEND:  - DC IVF now   - Blood xfusion today and then shanique with IV lasix.  This will also assist in the hyperkalemia.  She did get lasix in the past without any adverse outcome per .    - Change feeds to Nephro till the creatinine improves (Nepro at 25cc/hr)   - Hold the vancomycin.   - Renal dose all medications for a GFR <25

## 2019-07-08 NOTE — PROGRESS NOTE ADULT - SUBJECTIVE AND OBJECTIVE BOX
MYRIAM WHITE:[id number],   69yFemale followed for:  ASA; dye contrast (Anaphylaxis)  aspirin (Short breath)  divalproex sodium (Other (Unknown))  Flowers and Plants (Short breath)  Haldol (Other (Unknown))  penicillin (Short breath; Rash)  sulfa drugs (Short breath; Rash)  vancomycin (Rash; Urticaria; Hives)  Xanax (Other (Unknown))    PAST MEDICAL & SURGICAL HISTORY:  Type 2 diabetes mellitus  Dementia  DVT, lower extremity  CVA (cerebral vascular accident)  Fatty pancreas  PNA (pneumonia)  Pulmonary HTN  IGT (impaired glucose tolerance)  Ulcerative colitis  Acid reflux  Anxiety  Depression  Mouth sores  HLD (hyperlipidemia)  Asthma  S/P percutaneous endoscopic gastrostomy (PEG) tube placement: for dysphagia  Humeral head fracture  H/O: hysterectomy  H/O cataract extraction, left  History of knee replacement    FAMILY HISTORY:  Family history of dementia (Grandparent)  Family history of colon cancer (Grandparent)    MEDICATIONS  (STANDING):  ALBUTerol    90 MICROgram(s) HFA Inhaler 1 Puff(s) Inhalation every 4 hours  ALBUTerol/ipratropium for Nebulization 3 milliLiter(s) Nebulizer every 6 hours  ALBUTerol/ipratropium for Nebulization. 3 milliLiter(s) Nebulizer four times a day  ascorbic acid 500 milliGRAM(s) Oral daily  buDESOnide  80 MICROgram(s)/formoterol 4.5 MICROgram(s) Inhaler 2 Puff(s) Inhalation two times a day  cefepime   IVPB 1000 milliGRAM(s) IV Intermittent every 24 hours  chlorhexidine 2% Cloths 1 Application(s) Topical daily  clonazePAM  Tablet 1.5 milliGRAM(s) Oral at bedtime  dextrose 5% + sodium chloride 0.9%. 1000 milliLiter(s) (50 mL/Hr) IV Continuous <Continuous>  dextrose 5%. 1000 milliLiter(s) (50 mL/Hr) IV Continuous <Continuous>  dextrose 50% Injectable 12.5 Gram(s) IV Push once  dextrose 50% Injectable 25 Gram(s) IV Push once  dextrose 50% Injectable 25 Gram(s) IV Push once  diphenhydrAMINE   Injectable 25 milliGRAM(s) IV Push two times a day  ergocalciferol Drops 5000 Unit(s) Oral daily  famotidine    Tablet 20 milliGRAM(s) Oral daily  ferrous    sulfate Liquid 300 milliGRAM(s) Enteral Tube daily  gabapentin 300 milliGRAM(s) Oral two times a day  heparin  Injectable 5000 Unit(s) SubCutaneous every 12 hours  insulin lispro (HumaLOG) corrective regimen sliding scale   SubCutaneous every 6 hours  Medical Marijuana Awake Oil 2 milliLiter(s) 2 milliLiter(s) Enteral Tube <User Schedule>  Medical Marijuana Calm Oil 2 milliLiter(s) 2 milliLiter(s) Enteral Tube <User Schedule>  Medical Marijuana [place] Oil 2 milliLiter(s) 2 milliLiter(s) Enteral Tube <User Schedule>  metroNIDAZOLE  IVPB 500 milliGRAM(s) IV Intermittent every 12 hours  midodrine 5 milliGRAM(s) Oral every 8 hours  multivitamin/minerals/iron Oral Solution (CENTRUM) 15 milliLiter(s) Oral daily  QUEtiapine 25 milliGRAM(s) Oral daily  sodium chloride 0.45% 1000 milliLiter(s) (50 mL/Hr) IV Continuous <Continuous>  sodium chloride 0.9%. 1000 milliLiter(s) (50 mL/Hr) IV Continuous <Continuous>  tiotropium 18 MICROgram(s) Capsule 1 Capsule(s) Inhalation daily  tiotropium 18 MICROgram(s) Capsule 1 Capsule(s) Inhalation daily  tiZANidine 4 milliGRAM(s) Oral <User Schedule>  zinc sulfate 220 milliGRAM(s) Oral daily    MEDICATIONS  (PRN):  acetaminophen  Suppository .. 650 milliGRAM(s) Rectal every 6 hours PRN Temp greater or equal to 38C (100.4F), Mild Pain (1 - 3)  carboxymethylcellulose 0.5% (Preservative-Free) Ophthalmic Solution 1 Drop(s) Both EYES three times a day PRN dry eyes  dextrose 40% Gel 15 Gram(s) Oral once PRN Blood Glucose LESS THAN 70 milliGRAM(s)/deciliter  glucagon  Injectable 1 milliGRAM(s) IntraMuscular once PRN Glucose LESS THAN 70 milligrams/deciliter  nystatin Powder 1 Application(s) Topical three times a day PRN under breasts      Vital Signs Last 24 Hrs  T(C): 36.7 (08 Jul 2019 06:10), Max: 37 (07 Jul 2019 11:45)  T(F): 98.1 (08 Jul 2019 06:10), Max: 98.6 (07 Jul 2019 11:45)  HR: 102 (08 Jul 2019 06:10) (90 - 106)  BP: 137/85 (08 Jul 2019 06:10) (105/54 - 147/79)  BP(mean): --  RR: 18 (08 Jul 2019 06:10) (18 - 18)  SpO2: 100% (08 Jul 2019 06:10) (94% - 100%)  nc/at  s1s2  cta  soft, nt, nd no guarding or rebound  no c/c/e    CBC Full  -  ( 07 Jul 2019 06:10 )  WBC Count : 10.8 K/uL  RBC Count : 2.48 M/uL  Hemoglobin : 7.8 g/dL  Hematocrit : 22.6 %  Platelet Count - Automated : 427 K/uL  Mean Cell Volume : 91.3 fl  Mean Cell Hemoglobin : 31.4 pg  Mean Cell Hemoglobin Concentration : 34.4 gm/dL  Auto Neutrophil # : 8.2 K/uL  Auto Lymphocyte # : 0.7 K/uL  Auto Monocyte # : 1.0 K/uL  Auto Eosinophil # : 0.8 K/uL  Auto Basophil # : 0.0 K/uL  Auto Neutrophil % : 76.4 %  Auto Lymphocyte % : 6.2 %  Auto Monocyte % : 9.7 %  Auto Eosinophil % : 7.6 %  Auto Basophil % : 0.2 %    07-07    127<L>  |  95<L>  |  76<H>  ----------------------------<  117<H>  5.6<H>   |  17<L>  |  1.65<H>    Ca    8.9      07 Jul 2019 18:30

## 2019-07-08 NOTE — PROGRESS NOTE ADULT - SUBJECTIVE AND OBJECTIVE BOX
---___---___---___---___---___---___ ---___---___---___---___---___---___---___---___---                  M E D I C A L   A T T E N D I N G   P R O G R E S S   N O T E  ---___---___---___---___---___---___ ---___---___---___---___---___---___---___---___---        ================================================    ++CHIEF COMPLAINT:   Patient is a 69y old  Female who presents with a chief complaint of PEG tube dislodgement, fever, leukocytosis (2019 11:57)      Gastrostomy malfunction worsening renal  function     ---___---___---___---___---___---  PAST MEDICAL / Surgical  HISTORY:  PAST MEDICAL & SURGICAL HISTORY:  Type 2 diabetes mellitus  Dementia  DVT, lower extremity  CVA (cerebral vascular accident)  Fatty pancreas  PNA (pneumonia)  Pulmonary HTN  IGT (impaired glucose tolerance)  Ulcerative colitis  Acid reflux  Anxiety  Depression  Mouth sores  HLD (hyperlipidemia)  Asthma  S/P percutaneous endoscopic gastrostomy (PEG) tube placement: for dysphagia  Humeral head fracture  H/O: hysterectomy  H/O cataract extraction, left  History of knee replacement      ---___---___---___---___---___---  FAMILY HISTORY:   FAMILY HISTORY:  Family history of dementia (Grandparent)  Family history of colon cancer (Grandparent)        ---___---___---___---___---___---  ALLERGIES:   Allergies    ASA; dye contrast (Anaphylaxis)  aspirin (Short breath)  divalproex sodium (Other (Unknown))  Flowers and Plants (Short breath)  Haldol (Other (Unknown))  penicillin (Short breath; Rash)  sulfa drugs (Short breath; Rash)  vancomycin (Rash; Urticaria; Hives)  Xanax (Other (Unknown))    Intolerances        ---___---___---___---___---___---  MEDICATIONS:  MEDICATIONS  (STANDING):  ALBUTerol    90 MICROgram(s) HFA Inhaler 1 Puff(s) Inhalation every 4 hours  ALBUTerol/ipratropium for Nebulization 3 milliLiter(s) Nebulizer every 6 hours  ALBUTerol/ipratropium for Nebulization. 3 milliLiter(s) Nebulizer four times a day  ascorbic acid 500 milliGRAM(s) Oral daily  buDESOnide  80 MICROgram(s)/formoterol 4.5 MICROgram(s) Inhaler 2 Puff(s) Inhalation two times a day  cefepime   IVPB 1000 milliGRAM(s) IV Intermittent every 24 hours  chlorhexidine 2% Cloths 1 Application(s) Topical daily  clonazePAM  Tablet 1.5 milliGRAM(s) Oral at bedtime  dextrose 5%. 1000 milliLiter(s) (50 mL/Hr) IV Continuous <Continuous>  dextrose 50% Injectable 12.5 Gram(s) IV Push once  dextrose 50% Injectable 25 Gram(s) IV Push once  dextrose 50% Injectable 25 Gram(s) IV Push once  diphenhydrAMINE   Injectable 25 milliGRAM(s) IV Push two times a day  ergocalciferol Drops 5000 Unit(s) Oral daily  famotidine    Tablet 20 milliGRAM(s) Oral daily  ferrous    sulfate Liquid 300 milliGRAM(s) Enteral Tube daily  furosemide   Injectable 20 milliGRAM(s) IV Push once  gabapentin 300 milliGRAM(s) Oral two times a day  heparin  Injectable 5000 Unit(s) SubCutaneous every 12 hours  insulin lispro (HumaLOG) corrective regimen sliding scale   SubCutaneous every 6 hours  Medical Marijuana Awake Oil 2 milliLiter(s) 2 milliLiter(s) Enteral Tube <User Schedule>  Medical Marijuana Calm Oil 2 milliLiter(s) 2 milliLiter(s) Enteral Tube <User Schedule>  Medical Marijuana [place] Oil 2 milliLiter(s) 2 milliLiter(s) Enteral Tube <User Schedule>  metroNIDAZOLE  IVPB 500 milliGRAM(s) IV Intermittent every 12 hours  midodrine 5 milliGRAM(s) Oral every 8 hours  multivitamin/minerals/iron Oral Solution (CENTRUM) 15 milliLiter(s) Oral daily  QUEtiapine 25 milliGRAM(s) Oral daily  tiotropium 18 MICROgram(s) Capsule 1 Capsule(s) Inhalation daily  tiotropium 18 MICROgram(s) Capsule 1 Capsule(s) Inhalation daily  tiZANidine 4 milliGRAM(s) Oral <User Schedule>  zinc sulfate 220 milliGRAM(s) Oral daily    MEDICATIONS  (PRN):  acetaminophen  Suppository .. 650 milliGRAM(s) Rectal every 6 hours PRN Temp greater or equal to 38C (100.4F), Mild Pain (1 - 3)  carboxymethylcellulose 0.5% (Preservative-Free) Ophthalmic Solution 1 Drop(s) Both EYES three times a day PRN dry eyes  dextrose 40% Gel 15 Gram(s) Oral once PRN Blood Glucose LESS THAN 70 milliGRAM(s)/deciliter  glucagon  Injectable 1 milliGRAM(s) IntraMuscular once PRN Glucose LESS THAN 70 milligrams/deciliter  nystatin Powder 1 Application(s) Topical three times a day PRN under breasts      ---___---___---___---___---___---  REVIEW OF SYSTEM:    unable to obtain     ---___---___---___---___---___---  VITAL SIGNS:  69y , CAPILLARY BLOOD GLUCOSE      POCT Blood Glucose.: 143 mg/dL (2019 12:15)    T(C): 36.7 (19 @ 06:10), Max: 36.9 (19 @ 21:34)  HR: 102 (19 @ 06:10) (90 - 106)  BP: 137/85 (19 @ 06:10) (137/85 - 147/79)  RR: 18 (19 @ 06:10) (18 - 18)  SpO2: 100% (19 @ 06:10) (94% - 100%)  ---___---___---___---___---___---  PHYSICAL EXAM:    GEN: A&O X 0 , NAD , comfortable  HEENT: NCAT, PERRL, MMM, hearing intact  Neck: supple , no JVD  CVS: S1S2 , regular , No M/R/G appreciated  PULM: decreased breath sounds   ABD.: soft. non tender, non distended,  bowel sounds present  Extrem: intact pulses , no edema   Derm: No rash , no ecchymoses  PSYCH : normal mood,  no delusion not anxious     ---___---___---___---___---___---            LAB AND IMAGIN.1    10.1  )-----------( 377      ( 2019 10:24 )             20.8               07-08    130<L>  |  98  |  76<H>  ----------------------------<  124<H>  4.8   |  21<L>  |  1.84<H>    Ca    9.2      2019 10:24                              Urinalysis Basic - ( 2019 19:17 )    Color: Yellow / Appearance: Cloudy / S.017 / pH: x  Gluc: x / Ketone: Trace  / Bili: Negative / Urobili: Negative   Blood: x / Protein: 300 mg/dL / Nitrite: Negative   Leuk Esterase: Large / RBC: 10 /hpf / WBC >50   Sq Epi: x / Non Sq Epi: 0 / Bacteria: Moderate        [All pertinent / recent Imaging reviewed]         ---___---___---___---___---___---___ ---___---___---___---___---                         A S S E S S M E N T   A N D   P L A N :      HEALTH ISSUES - PROBLEM Dx:    acute renal failure renal on board continue iv fluids  anemia  rbc declined getting one unit prbc with lasix   chronic pain  on medical marijuana for chronic pain   agitation continue seroquel and klonopin    functional quadraplegia  supportive care   asthma  continue meds  dm2 secondary to meds on insulin   pneumonia on abx            -GI/DVT Prophylaxis.    --------------------------------------------  Case discussed with   Education given on   ___________________________  Thank you,  Deniz Bolden  [phone]

## 2019-07-08 NOTE — PROGRESS NOTE ADULT - SUBJECTIVE AND OBJECTIVE BOX
infectious diseases progress note:    Patient is a 69y old  Female who presents with a chief complaint of PEG tube dislodgement, fever, leukocytosis (2019 14:13)        Gastrostomy malfunction             Allergies    ASA; dye contrast (Anaphylaxis)  aspirin (Short breath)  divalproex sodium (Other (Unknown))  Flowers and Plants (Short breath)  Haldol (Other (Unknown))  penicillin (Short breath; Rash)  sulfa drugs (Short breath; Rash)  vancomycin (Rash; Urticaria; Hives)  Xanax (Other (Unknown))    Intolerances        ANTIBIOTICS/RELEVANT:  antimicrobials  cefepime   IVPB 1000 milliGRAM(s) IV Intermittent every 24 hours  metroNIDAZOLE  IVPB 500 milliGRAM(s) IV Intermittent every 12 hours    immunologic:    OTHER:  acetaminophen  Suppository .. 650 milliGRAM(s) Rectal every 6 hours PRN  ALBUTerol    90 MICROgram(s) HFA Inhaler 1 Puff(s) Inhalation every 4 hours  ALBUTerol/ipratropium for Nebulization 3 milliLiter(s) Nebulizer every 6 hours  ALBUTerol/ipratropium for Nebulization. 3 milliLiter(s) Nebulizer four times a day  ascorbic acid 500 milliGRAM(s) Oral daily  buDESOnide  80 MICROgram(s)/formoterol 4.5 MICROgram(s) Inhaler 2 Puff(s) Inhalation two times a day  carboxymethylcellulose 0.5% (Preservative-Free) Ophthalmic Solution 1 Drop(s) Both EYES three times a day PRN  chlorhexidine 2% Cloths 1 Application(s) Topical daily  clonazePAM  Tablet 1.5 milliGRAM(s) Oral at bedtime  dextrose 40% Gel 15 Gram(s) Oral once PRN  dextrose 5% + sodium chloride 0.9%. 1000 milliLiter(s) IV Continuous <Continuous>  dextrose 5%. 1000 milliLiter(s) IV Continuous <Continuous>  dextrose 50% Injectable 12.5 Gram(s) IV Push once  dextrose 50% Injectable 25 Gram(s) IV Push once  dextrose 50% Injectable 25 Gram(s) IV Push once  diphenhydrAMINE   Injectable 25 milliGRAM(s) IV Push two times a day  ergocalciferol Drops 5000 Unit(s) Oral daily  famotidine    Tablet 20 milliGRAM(s) Oral daily  ferrous    sulfate Liquid 300 milliGRAM(s) Enteral Tube daily  gabapentin 300 milliGRAM(s) Oral two times a day  glucagon  Injectable 1 milliGRAM(s) IntraMuscular once PRN  heparin  Injectable 5000 Unit(s) SubCutaneous every 12 hours  insulin lispro (HumaLOG) corrective regimen sliding scale   SubCutaneous every 6 hours  Medical Marijuana Awake Oil 2 milliLiter(s) 2 milliLiter(s) Enteral Tube <User Schedule>  Medical Marijuana Calm Oil 2 milliLiter(s) 2 milliLiter(s) Enteral Tube <User Schedule>  Medical Marijuana [place] Oil 2 milliLiter(s) 2 milliLiter(s) Enteral Tube <User Schedule>  midodrine 5 milliGRAM(s) Oral every 8 hours  multivitamin/minerals/iron Oral Solution (CENTRUM) 15 milliLiter(s) Oral daily  nystatin Powder 1 Application(s) Topical three times a day PRN  QUEtiapine 25 milliGRAM(s) Oral daily  sodium chloride 0.45% 1000 milliLiter(s) IV Continuous <Continuous>  sodium chloride 0.9%. 1000 milliLiter(s) IV Continuous <Continuous>  tiotropium 18 MICROgram(s) Capsule 1 Capsule(s) Inhalation daily  tiotropium 18 MICROgram(s) Capsule 1 Capsule(s) Inhalation daily  tiZANidine 4 milliGRAM(s) Oral <User Schedule>  zinc sulfate 220 milliGRAM(s) Oral daily      Objective:  Vital Signs Last 24 Hrs  T(C): 36.7 (2019 06:10), Max: 37 (2019 11:45)  T(F): 98.1 (2019 06:10), Max: 98.6 (2019 11:45)  HR: 102 (2019 06:10) (90 - 106)  BP: 137/85 (2019 06:10) (105/54 - 147/79)  BP(mean): --  RR: 18 (2019 06:10) (18 - 18)  SpO2: 100% (2019 06:10) (94% - 100%)    PHYSICAL EXAM:     Eyes:JACQUELIN, EOMI  Ear/Nose/Throat: no oral lesion, no sinus tenderness on percussion	  Neck:no JVD, no lymphadenopathy, supple  Respiratory: CTA maryjane  Cardiovascular: S1S2 RRR, no murmurs  Gastrointestinal:soft, (+) BS, no HSM           LABS:                        7.8    10.8  )-----------( 427      ( 2019 06:10 )             22.6         127<L>  |  95<L>  |  76<H>  ----------------------------<  117<H>  5.6<H>   |  17<L>  |  1.65<H>    Ca    8.9      2019 18:30        Urinalysis Basic - ( 2019 19:17 )    Color: Yellow / Appearance: Cloudy / S.017 / pH: x  Gluc: x / Ketone: Trace  / Bili: Negative / Urobili: Negative   Blood: x / Protein: 300 mg/dL / Nitrite: Negative   Leuk Esterase: Large / RBC: 10 /hpf / WBC >50   Sq Epi: x / Non Sq Epi: 0 / Bacteria: Moderate          MICROBIOLOGY:    RECENT CULTURES:   @ 23:36 .Blood                No growth to date.     @ 13:06 .Blood                No growth to date.     @ 22:05 .Blood                No growth at 5 days.          RESPIRATORY CULTURES:              RADIOLOGY & ADDITIONAL STUDIES:        Pager [phone]  After 5 pm/weekends or if no response :[phone]

## 2019-07-08 NOTE — CONSULT NOTE ADULT - SUBJECTIVE AND OBJECTIVE BOX
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  thinks she must have pulled it out this morning. He says otherwise she was in her usual state of health at home. He says she has had some intermittent low grade fevers at home, which usually respond to Tylenol.   He says her Blackman was due to be changed early July 2019.   Last dose of Eliquis was 6/23/19 8pm.   In ED patient received 1LNS and cefepime. (24 Jun 2019 15:39)    PMH:   Type 2 diabetes mellitus  Dementia  DVT, lower extremity  CVA (cerebral vascular accident)  Fatty pancreas  Fatty liver  PNA (pneumonia)  Pulmonary HTN  IGT (impaired glucose tolerance)  Ulcerative colitis  Acid reflux  Anxiety  Depression  Mouth sores  HLD (hyperlipidemia)  Asthma    PSH:   S/P percutaneous endoscopic gastrostomy (PEG) tube placement  Humeral head fracture  H/O: hysterectomy  H/O cataract extraction, left  History of knee replacement    Medications:   acetaminophen  Suppository .. 650 milliGRAM(s) Rectal every 6 hours PRN  ALBUTerol    90 MICROgram(s) HFA Inhaler 1 Puff(s) Inhalation every 4 hours  ALBUTerol/ipratropium for Nebulization 3 milliLiter(s) Nebulizer every 6 hours  ALBUTerol/ipratropium for Nebulization. 3 milliLiter(s) Nebulizer four times a day  ascorbic acid 500 milliGRAM(s) Oral daily  buDESOnide  80 MICROgram(s)/formoterol 4.5 MICROgram(s) Inhaler 2 Puff(s) Inhalation two times a day  carboxymethylcellulose 0.5% (Preservative-Free) Ophthalmic Solution 1 Drop(s) Both EYES three times a day PRN  cefepime   IVPB 1000 milliGRAM(s) IV Intermittent every 24 hours  chlorhexidine 2% Cloths 1 Application(s) Topical daily  clonazePAM  Tablet 1.5 milliGRAM(s) Oral at bedtime  dextrose 40% Gel 15 Gram(s) Oral once PRN  dextrose 5%. 1000 milliLiter(s) IV Continuous <Continuous>  dextrose 50% Injectable 12.5 Gram(s) IV Push once  dextrose 50% Injectable 25 Gram(s) IV Push once  dextrose 50% Injectable 25 Gram(s) IV Push once  diphenhydrAMINE   Injectable 25 milliGRAM(s) IV Push two times a day  ergocalciferol Drops 5000 Unit(s) Oral daily  famotidine    Tablet 20 milliGRAM(s) Oral daily  ferrous    sulfate Liquid 300 milliGRAM(s) Enteral Tube daily  gabapentin 300 milliGRAM(s) Oral two times a day  glucagon  Injectable 1 milliGRAM(s) IntraMuscular once PRN  heparin  Injectable 5000 Unit(s) SubCutaneous every 12 hours  insulin lispro (HumaLOG) corrective regimen sliding scale   SubCutaneous every 6 hours  Medical Marijuana Awake Oil 2 milliLiter(s) 2 milliLiter(s) Enteral Tube <User Schedule>  Medical Marijuana Calm Oil 2 milliLiter(s) 2 milliLiter(s) Enteral Tube <User Schedule>  Medical Marijuana [place] Oil 2 milliLiter(s) 2 milliLiter(s) Enteral Tube <User Schedule>  metroNIDAZOLE  IVPB 500 milliGRAM(s) IV Intermittent every 12 hours  midodrine 5 milliGRAM(s) Oral every 8 hours  multivitamin/minerals/iron Oral Solution (CENTRUM) 15 milliLiter(s) Oral daily  nystatin Powder 1 Application(s) Topical three times a day PRN  QUEtiapine 25 milliGRAM(s) Oral daily  tiotropium 18 MICROgram(s) Capsule 1 Capsule(s) Inhalation daily  tiotropium 18 MICROgram(s) Capsule 1 Capsule(s) Inhalation daily  tiZANidine 4 milliGRAM(s) Oral <User Schedule>  zinc sulfate 220 milliGRAM(s) Oral daily    Allergies:  ASA; dye contrast (Anaphylaxis)  aspirin (Short breath)  divalproex sodium (Other (Unknown))  Flowers and Plants (Short breath)  Haldol (Other (Unknown))  penicillin (Short breath; Rash)  sulfa drugs (Short breath; Rash)  vancomycin (Rash; Urticaria; Hives)  Xanax (Other (Unknown))    FAMILY HISTORY:  Family history of dementia (Grandparent)  Family history of colon cancer (Grandparent)    Social History: , lives with  and daughters at home, completely dependent for ADLs    Review of Systems:  [x]Unable to obtain due to advanced dementia    Physical Exam:  T(C): 36.7 (07-08-19 @ 06:10), Max: 36.9 (07-07-19 @ 21:34)  HR: 102 (07-08-19 @ 06:10) (90 - 106)  BP: 137/85 (07-08-19 @ 06:10) (137/85 - 147/79)  RR: 18 (07-08-19 @ 06:10) (18 - 18)  SpO2: 100% (07-08-19 @ 06:10) (94% - 100%)  Wt(kg): --    07-07 @ 07:01  -  07-08 @ 07:00  --------------------------------------------------------  IN: 1000 mL / OUT: 850 mL / NET: 150 mL      Daily     Daily     Appearance: functional quadriplegia   Eyes: PERRLA, EOMI, pink conjunctiva, no scleral icterus   HENT: Normal oral mucosa  Cardiovascular: RRR, S1, S2, no murmur, rub, or gallop; + edema in hands and feet; +JVD  Procedural Access Site: Clean, dry, intact, without hematoma  Respiratory: bilateral air entry; poor inspiratory effort  Gastrointestinal: Soft, non-tender, non-distended, BS+, +PEG  Musculoskeletal: +contracted  Neurologic: functional quardiplegia  Lymphatic: No lymphadenopathy  Psychiatry: advanced dementia  Skin: No rashes, ecchymoses, or cyanosis on extremities    Cardiovascular Diagnostic Testing:  ECG: sinus 94 bpm, left axis, IVCD, low voltage in precordial leads    Echo: < from: Transthoracic Echocardiogram (03.04.19 @ 14:47) >  ------------------------------------------------------------------------  Conclusions:  1. Aortic valve not well visualized.  2. Mild concentric left ventricular hypertrophy.  3. Endocardium not well visualized; grossly normal left  ventricular systolic function. Unable to exclude wall  motion abnormalities.  ------------------------------------------------------------------------  Confirmed on  3/5/2019 - 08:28:05 by Malissa Hernández M.D.  ------------------------------------------------------------------------    < end of copied text >    Interpretation of Telemetry: not on tele    Labs:                        7.1    10.1  )-----------( 377      ( 08 Jul 2019 10:24 )             20.8     07-08    130<L>  |  98  |  76<H>  ----------------------------<  124<H>  4.8   |  21<L>  |  1.84<H>    Ca    9.2      08 Jul 2019 10:24

## 2019-07-08 NOTE — PROGRESS NOTE ADULT - ASSESSMENT
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  Fever and leukocytosis now. Abnormal CT chest.   RRT episode last night, patient found with hypoxia and AMS suspect from aspiration event.    Recommend:   -Trend temps  -Cont abx -  / cefepime/ flagyl  -Monitor vanco trough  -Prob from aspiration/pna  vanco  to be held due to high level   all cultures negative and temps better   will complete ab this week if afebrile   -

## 2019-07-08 NOTE — CHART NOTE - NSCHARTNOTEFT_GEN_A_CORE
Pt seen for malnutrition follow up and consult per NP request. Pt 70 y/o F with PMH: multiple hospitalizations, dementia (non-verbal), dysphagia with PEG, functional quadriplegia, Blackman cath, chronic pressure ulcers in sacral and heels, asthma on Prednisone, HLD, CVA, chronic MRSA of right hip prothesis, left knee fracture, DM, anxiety, UTI, admitted with fever, leukocytosis, admitted with PEG dislodgement - S/P replacement in IR (06/25). Pt now with TRANG, hyperkalemia, plan for tube feeding change to Nepro in interim until kidney function improves.     Source: Patient [ ]    Family [ ]     other [ ]    Diet :       Patient reports [ ] nausea  [ ] vomiting [ ] diarrhea [ ] constipation  [ ]chewing problems [ ] swallowing issues  [ ] other:      PO intake:  < 50% [ ] 50-75% [ ]   % [ ]  other :     Source for PO intake [ ] Patient [ ] family [ ] chart [ ] staff [ ] other     Enteral /Parenteral Nutrition:       Current Weight:   % Weight Change    Pertinent Medications: MEDICATIONS  (STANDING):  ALBUTerol    90 MICROgram(s) HFA Inhaler 1 Puff(s) Inhalation every 4 hours  ALBUTerol/ipratropium for Nebulization 3 milliLiter(s) Nebulizer every 6 hours  ALBUTerol/ipratropium for Nebulization. 3 milliLiter(s) Nebulizer four times a day  ascorbic acid 500 milliGRAM(s) Oral daily  buDESOnide  80 MICROgram(s)/formoterol 4.5 MICROgram(s) Inhaler 2 Puff(s) Inhalation two times a day  cefepime   IVPB 1000 milliGRAM(s) IV Intermittent every 24 hours  chlorhexidine 2% Cloths 1 Application(s) Topical daily  clonazePAM  Tablet 1.5 milliGRAM(s) Oral at bedtime  dextrose 5%. 1000 milliLiter(s) (50 mL/Hr) IV Continuous <Continuous>  dextrose 50% Injectable 12.5 Gram(s) IV Push once  dextrose 50% Injectable 25 Gram(s) IV Push once  dextrose 50% Injectable 25 Gram(s) IV Push once  diphenhydrAMINE   Injectable 25 milliGRAM(s) IV Push two times a day  ergocalciferol Drops 5000 Unit(s) Oral daily  famotidine    Tablet 20 milliGRAM(s) Oral daily  ferrous    sulfate Liquid 300 milliGRAM(s) Enteral Tube daily  furosemide   Injectable 20 milliGRAM(s) IV Push once  gabapentin 300 milliGRAM(s) Oral two times a day  heparin  Injectable 5000 Unit(s) SubCutaneous every 12 hours  insulin lispro (HumaLOG) corrective regimen sliding scale   SubCutaneous every 6 hours  Medical Marijuana Awake Oil 2 milliLiter(s) 2 milliLiter(s) Enteral Tube <User Schedule>  Medical Marijuana Calm Oil 2 milliLiter(s) 2 milliLiter(s) Enteral Tube <User Schedule>  Medical Marijuana [place] Oil 2 milliLiter(s) 2 milliLiter(s) Enteral Tube <User Schedule>  metroNIDAZOLE  IVPB 500 milliGRAM(s) IV Intermittent every 12 hours  midodrine 5 milliGRAM(s) Oral every 8 hours  multivitamin/minerals/iron Oral Solution (CENTRUM) 15 milliLiter(s) Oral daily  QUEtiapine 25 milliGRAM(s) Oral daily  tiotropium 18 MICROgram(s) Capsule 1 Capsule(s) Inhalation daily  tiotropium 18 MICROgram(s) Capsule 1 Capsule(s) Inhalation daily  tiZANidine 4 milliGRAM(s) Oral <User Schedule>  zinc sulfate 220 milliGRAM(s) Oral daily    MEDICATIONS  (PRN):  acetaminophen  Suppository .. 650 milliGRAM(s) Rectal every 6 hours PRN Temp greater or equal to 38C (100.4F), Mild Pain (1 - 3)  carboxymethylcellulose 0.5% (Preservative-Free) Ophthalmic Solution 1 Drop(s) Both EYES three times a day PRN dry eyes  dextrose 40% Gel 15 Gram(s) Oral once PRN Blood Glucose LESS THAN 70 milliGRAM(s)/deciliter  glucagon  Injectable 1 milliGRAM(s) IntraMuscular once PRN Glucose LESS THAN 70 milligrams/deciliter  nystatin Powder 1 Application(s) Topical three times a day PRN under breasts    Pertinent Labs:  07-08 Na130 mmol/L<L> Glu 124 mg/dL<H> K+ 4.8 mmol/L Cr  1.84 mg/dL<H> BUN 76 mg/dL<H> 07-05 Phos 4.6 mg/dL<H> 07-05 Alb 1.9 g/dL<L> 06-14 [id number] 5.0 %      Skin:     Estimated Needs:   [ ] no change since previous assessment  [ ] recalculated:       Previous Nutrition Diagnosis:     [ ] Inadequate Energy Intake [ ]Inadequate Oral Intake [ ] Excessive Energy Intake     [ ] Underweight [ ] Increased Nutrient Needs [ ] Overweight/Obesity     [ ] Altered GI Function [ ] Unintended Weight Loss [ ] Food & Nutrition Related Knowledge Deficit [ ] Malnutrition          Nutrition Diagnosis is [ ] ongoing  [ ] resolved [ ] not applicable          New Nutrition Diagnosis: [ ] not applicable    [ ] Inadequate Protein Energy Intake [ ]Inadequate Oral Intake [ ] Excessive Energy Intake     [ ] Underweight [ ] Increased Nutrient Needs [ ] Overweight/Obesity     [ ] Altered GI Function [ ] Unintended Weight Loss [ ] Food & Nutrition Related Knowledge Deficit[ ] Limited Adherence to nutrition related recommendations [ ] Malnutrition  [ ] other: Free text       Related to:      As evidenced by:      Interventions:     Recommend    [ ] Change Diet To:    [ ] Nutrition Supplement    [ ] Nutrition Support    [ ] Other:        Monitoring and Evaluation:     [ ] PO intake [ ] Tolerance to diet prescription [ ] weights [ ] follow up per protocol    [ ] other: Pt seen for malnutrition follow up and consult per NP request. Pt 70 y/o F with PMH: multiple hospitalizations, dementia (non-verbal), dysphagia with PEG, functional quadriplegia, Blackman cath, chronic pressure ulcers in sacral and heels, asthma on Prednisone, HLD, CVA, chronic MRSA of right hip prothesis, left knee fracture, DM, anxiety, UTI, admitted with fever, leukocytosis, admitted with PEG dislodgement - S/P replacement in IR (06/25). Pt now with TRANG, hyperkalemia, plan for tube feeding change to Nepro in interim until kidney function improves.     Source: Patient [ ]    Family [ x]     other [ ]  at bedside     Diet : Glucerna 1.2 Golden 240 mL every 6 hours for a total of 4 feeds per day  with prosource 2 packets daily       Per  pt with no N+V, large BM yesterday following laxitive with smaller bowel movement this morning          Enteral /Parenteral Nutrition: Per  pt has been tolerating bolus feeds glucerna 1.2 golden well however stated pt received only 3 feeds yesterday due to high residual-exact value not noted      Current Weight: No new wt  % Weight Change    Pertinent Medications: MEDICATIONS  (STANDING):  ALBUTerol    90 MICROgram(s) HFA Inhaler 1 Puff(s) Inhalation every 4 hours  ALBUTerol/ipratropium for Nebulization 3 milliLiter(s) Nebulizer every 6 hours  ALBUTerol/ipratropium for Nebulization. 3 milliLiter(s) Nebulizer four times a day  ascorbic acid 500 milliGRAM(s) Oral daily  buDESOnide  80 MICROgram(s)/formoterol 4.5 MICROgram(s) Inhaler 2 Puff(s) Inhalation two times a day  cefepime   IVPB 1000 milliGRAM(s) IV Intermittent every 24 hours  chlorhexidine 2% Cloths 1 Application(s) Topical daily  clonazePAM  Tablet 1.5 milliGRAM(s) Oral at bedtime  dextrose 5%. 1000 milliLiter(s) (50 mL/Hr) IV Continuous <Continuous>  dextrose 50% Injectable 12.5 Gram(s) IV Push once  dextrose 50% Injectable 25 Gram(s) IV Push once  dextrose 50% Injectable 25 Gram(s) IV Push once  diphenhydrAMINE   Injectable 25 milliGRAM(s) IV Push two times a day  ergocalciferol Drops 5000 Unit(s) Oral daily  famotidine    Tablet 20 milliGRAM(s) Oral daily  ferrous    sulfate Liquid 300 milliGRAM(s) Enteral Tube daily  furosemide   Injectable 20 milliGRAM(s) IV Push once  gabapentin 300 milliGRAM(s) Oral two times a day  heparin  Injectable 5000 Unit(s) SubCutaneous every 12 hours  insulin lispro (HumaLOG) corrective regimen sliding scale   SubCutaneous every 6 hours  Medical Marijuana Awake Oil 2 milliLiter(s) 2 milliLiter(s) Enteral Tube <User Schedule>  Medical Marijuana Calm Oil 2 milliLiter(s) 2 milliLiter(s) Enteral Tube <User Schedule>  Medical Marijuana [place] Oil 2 milliLiter(s) 2 milliLiter(s) Enteral Tube <User Schedule>  metroNIDAZOLE  IVPB 500 milliGRAM(s) IV Intermittent every 12 hours  midodrine 5 milliGRAM(s) Oral every 8 hours  multivitamin/minerals/iron Oral Solution (CENTRUM) 15 milliLiter(s) Oral daily  QUEtiapine 25 milliGRAM(s) Oral daily  tiotropium 18 MICROgram(s) Capsule 1 Capsule(s) Inhalation daily  tiotropium 18 MICROgram(s) Capsule 1 Capsule(s) Inhalation daily  tiZANidine 4 milliGRAM(s) Oral <User Schedule>  zinc sulfate 220 milliGRAM(s) Oral daily    MEDICATIONS  (PRN):  acetaminophen  Suppository .. 650 milliGRAM(s) Rectal every 6 hours PRN Temp greater or equal to 38C (100.4F), Mild Pain (1 - 3)  carboxymethylcellulose 0.5% (Preservative-Free) Ophthalmic Solution 1 Drop(s) Both EYES three times a day PRN dry eyes  dextrose 40% Gel 15 Gram(s) Oral once PRN Blood Glucose LESS THAN 70 milliGRAM(s)/deciliter  glucagon  Injectable 1 milliGRAM(s) IntraMuscular once PRN Glucose LESS THAN 70 milligrams/deciliter  nystatin Powder 1 Application(s) Topical three times a day PRN under breasts    Pertinent Labs:  07-08 Na130 mmol/L<L> Glu 124 mg/dL<H> K+ 4.8 mmol/L Cr  1.84 mg/dL<H> BUN 76 mg/dL<H> 07-05 Phos 4.6 mg/dL<H> 07-05 Alb 1.9 g/dL<L> 06-14 [id number] 5.0 %    CAPILLARY BLOOD GLUCOSE      POCT Blood Glucose.: 143 mg/dL (08 Jul 2019 12:15)  POCT Blood Glucose.: 129 mg/dL (08 Jul 2019 06:01)  POCT Blood Glucose.: 146 mg/dL (07 Jul 2019 23:37)  POCT Blood Glucose.: 128 mg/dL (07 Jul 2019 18:14)        Skin per nursing flowsheets: 3+ generalized edema noted as well as multiple pressure injuries: stage 2 to L inner malleolus, stage 3 to L lower buttock, stage 4 to sacrum and unstageable to low lumbar, L mallelous, R medial lower leg, pt also noted with DTI to L inner thigh , R buttock and L heel     Estimated Needs:   [x ] no change since previous assessment  [ ] recalculated:       Previous Nutrition Diagnosis:     [ x] Malnutrition (mild)- ongoing    [x] Increased nutrient needs in setting of wound healing- ongoing         New Nutrition Diagnosis: [x ] not applicable       Interventions:     Recommend    1. Per nephrology request plan to change tube feeding to Nepro, consider 240 mL (1 can) bolus every 6 hours (8am, 2pm, 8pm). This regimen at goal provides 1275 Kcal, 57g protein, 516 mL free water (33 Kcal/Kg, 1.5g protein/Kg based on dosing wt 38.6 Kg-6/24)   2. Consider adding wendi 2 packets daily to further aid in wound healing (additional 60 Kcal, 14g amino acid per packet)   3. Consider discontinuing zinc at this time, continue with daily multivitamin and vitamin C.      Monitoring and Evaluation:     [x ] PO intake [x ] Tolerance to diet prescription [x ] weights [ x] follow up per protocol    [ ] other: Pt seen for malnutrition follow up and consult per NP request. Pt 68 y/o F with PMH: multiple hospitalizations, dementia (non-verbal), dysphagia with PEG, functional quadriplegia, Blackman cath, chronic pressure ulcers in sacral and heels, asthma on Prednisone, HLD, CVA, chronic MRSA of right hip prothesis, left knee fracture, DM, anxiety, UTI, admitted with fever, leukocytosis, admitted with PEG dislodgement - S/P replacement in IR (06/25). Pt now with TRANG, hyperkalemia, plan for tube feeding change to Nepro in interim until kidney function improves.     Source: Patient [ ]    Family [ x]     other [ ]  at bedside     Diet : Glucerna 1.2 Golden 240 mL every 6 hours for a total of 4 feeds per day  with prosource 2 packets daily       Per  pt with no N+V, large BM yesterday following laxitive with smaller bowel movement this morning     Per  pt has had reaction to Iron in the past and therefore would prefer pt not receive this in house.        Enteral /Parenteral Nutrition: Per  pt has been tolerating bolus feeds glucerna 1.2 golden well however stated pt received only 3 feeds yesterday due to high residual-exact value not noted.  stated pt has not yet received prosource supplement     Current Weight: No new wt  % Weight Change    Pertinent Medications: MEDICATIONS  (STANDING):  ALBUTerol    90 MICROgram(s) HFA Inhaler 1 Puff(s) Inhalation every 4 hours  ALBUTerol/ipratropium for Nebulization 3 milliLiter(s) Nebulizer every 6 hours  ALBUTerol/ipratropium for Nebulization. 3 milliLiter(s) Nebulizer four times a day  ascorbic acid 500 milliGRAM(s) Oral daily  buDESOnide  80 MICROgram(s)/formoterol 4.5 MICROgram(s) Inhaler 2 Puff(s) Inhalation two times a day  cefepime   IVPB 1000 milliGRAM(s) IV Intermittent every 24 hours  chlorhexidine 2% Cloths 1 Application(s) Topical daily  clonazePAM  Tablet 1.5 milliGRAM(s) Oral at bedtime  dextrose 5%. 1000 milliLiter(s) (50 mL/Hr) IV Continuous <Continuous>  dextrose 50% Injectable 12.5 Gram(s) IV Push once  dextrose 50% Injectable 25 Gram(s) IV Push once  dextrose 50% Injectable 25 Gram(s) IV Push once  diphenhydrAMINE   Injectable 25 milliGRAM(s) IV Push two times a day  ergocalciferol Drops 5000 Unit(s) Oral daily  famotidine    Tablet 20 milliGRAM(s) Oral daily  ferrous    sulfate Liquid 300 milliGRAM(s) Enteral Tube daily  furosemide   Injectable 20 milliGRAM(s) IV Push once  gabapentin 300 milliGRAM(s) Oral two times a day  heparin  Injectable 5000 Unit(s) SubCutaneous every 12 hours  insulin lispro (HumaLOG) corrective regimen sliding scale   SubCutaneous every 6 hours  Medical Marijuana Awake Oil 2 milliLiter(s) 2 milliLiter(s) Enteral Tube <User Schedule>  Medical Marijuana Calm Oil 2 milliLiter(s) 2 milliLiter(s) Enteral Tube <User Schedule>  Medical Marijuana [place] Oil 2 milliLiter(s) 2 milliLiter(s) Enteral Tube <User Schedule>  metroNIDAZOLE  IVPB 500 milliGRAM(s) IV Intermittent every 12 hours  midodrine 5 milliGRAM(s) Oral every 8 hours  multivitamin/minerals/iron Oral Solution (CENTRUM) 15 milliLiter(s) Oral daily  QUEtiapine 25 milliGRAM(s) Oral daily  tiotropium 18 MICROgram(s) Capsule 1 Capsule(s) Inhalation daily  tiotropium 18 MICROgram(s) Capsule 1 Capsule(s) Inhalation daily  tiZANidine 4 milliGRAM(s) Oral <User Schedule>  zinc sulfate 220 milliGRAM(s) Oral daily    MEDICATIONS  (PRN):  acetaminophen  Suppository .. 650 milliGRAM(s) Rectal every 6 hours PRN Temp greater or equal to 38C (100.4F), Mild Pain (1 - 3)  carboxymethylcellulose 0.5% (Preservative-Free) Ophthalmic Solution 1 Drop(s) Both EYES three times a day PRN dry eyes  dextrose 40% Gel 15 Gram(s) Oral once PRN Blood Glucose LESS THAN 70 milliGRAM(s)/deciliter  glucagon  Injectable 1 milliGRAM(s) IntraMuscular once PRN Glucose LESS THAN 70 milligrams/deciliter  nystatin Powder 1 Application(s) Topical three times a day PRN under breasts    Pertinent Labs:  07-08 Na130 mmol/L<L> Glu 124 mg/dL<H> K+ 4.8 mmol/L Cr  1.84 mg/dL<H> BUN 76 mg/dL<H> 07-05 Phos 4.6 mg/dL<H> 07-05 Alb 1.9 g/dL<L> 06-14 [id number] 5.0 %    CAPILLARY BLOOD GLUCOSE      POCT Blood Glucose.: 143 mg/dL (08 Jul 2019 12:15)  POCT Blood Glucose.: 129 mg/dL (08 Jul 2019 06:01)  POCT Blood Glucose.: 146 mg/dL (07 Jul 2019 23:37)  POCT Blood Glucose.: 128 mg/dL (07 Jul 2019 18:14)        Skin per nursing flowsheets: 3+ generalized edema noted as well as multiple pressure injuries: stage 2 to L inner malleolus, stage 3 to L lower buttock, stage 4 to sacrum and unstageable to low lumbar, L mallelous, R medial lower leg, pt also noted with DTI to L inner thigh , R buttock and L heel     Estimated Needs:   [x ] no change since previous assessment  [ ] recalculated:       Previous Nutrition Diagnosis:     [ x] Malnutrition (mild)- ongoing    [x] Increased nutrient needs in setting of wound healing- ongoing         New Nutrition Diagnosis: [x ] not applicable       Interventions:     Recommend    1. Per nephrology request plan to change tube feeding to Nepro, consider 240 mL (1 can) bolus every 6 hours (8am, 2pm, 8pm). This regimen at goal provides 1275 Kcal, 57g protein, 516 mL free water (33 Kcal/Kg, 1.5g protein/Kg based on dosing wt 38.6 Kg-6/24)   2. Consider discontinuing zinc at this time, continue with daily multivitamin and vitamin C.      Monitoring and Evaluation:     [x ] PO intake [x ] Tolerance to diet prescription [x ] weights [ x] follow up per protocol    [ ] other:

## 2019-07-09 LAB
ANION GAP SERPL CALC-SCNC: 12 MMOL/L — SIGNIFICANT CHANGE UP (ref 5–17)
BUN SERPL-MCNC: 79 MG/DL — HIGH (ref 7–23)
CALCIUM SERPL-MCNC: 9.6 MG/DL — SIGNIFICANT CHANGE UP (ref 8.4–10.5)
CHLORIDE SERPL-SCNC: 98 MMOL/L — SIGNIFICANT CHANGE UP (ref 96–108)
CO2 SERPL-SCNC: 21 MMOL/L — LOW (ref 22–31)
CREAT SERPL-MCNC: 1.95 MG/DL — HIGH (ref 0.5–1.3)
GLUCOSE BLDC GLUCOMTR-MCNC: 102 MG/DL — HIGH (ref 70–99)
GLUCOSE BLDC GLUCOMTR-MCNC: 149 MG/DL — HIGH (ref 70–99)
GLUCOSE BLDC GLUCOMTR-MCNC: 151 MG/DL — HIGH (ref 70–99)
GLUCOSE BLDC GLUCOMTR-MCNC: 166 MG/DL — HIGH (ref 70–99)
GLUCOSE SERPL-MCNC: 120 MG/DL — HIGH (ref 70–99)
HCT VFR BLD CALC: 29.1 % — LOW (ref 34.5–45)
HGB BLD-MCNC: 9.7 G/DL — LOW (ref 11.5–15.5)
MCHC RBC-ENTMCNC: 29.9 PG — SIGNIFICANT CHANGE UP (ref 27–34)
MCHC RBC-ENTMCNC: 33.4 GM/DL — SIGNIFICANT CHANGE UP (ref 32–36)
MCV RBC AUTO: 89.5 FL — SIGNIFICANT CHANGE UP (ref 80–100)
PLATELET # BLD AUTO: 471 K/UL — HIGH (ref 150–400)
POTASSIUM SERPL-MCNC: 4.1 MMOL/L — SIGNIFICANT CHANGE UP (ref 3.5–5.3)
POTASSIUM SERPL-SCNC: 4.1 MMOL/L — SIGNIFICANT CHANGE UP (ref 3.5–5.3)
RBC # BLD: 3.25 M/UL — LOW (ref 3.8–5.2)
RBC # FLD: 13.9 % — SIGNIFICANT CHANGE UP (ref 10.3–14.5)
SODIUM SERPL-SCNC: 131 MMOL/L — LOW (ref 135–145)
WBC # BLD: 12.2 K/UL — HIGH (ref 3.8–10.5)
WBC # FLD AUTO: 12.2 K/UL — HIGH (ref 3.8–10.5)

## 2019-07-09 PROCEDURE — 99233 SBSQ HOSP IP/OBS HIGH 50: CPT

## 2019-07-09 PROCEDURE — 99232 SBSQ HOSP IP/OBS MODERATE 35: CPT

## 2019-07-09 RX ORDER — CLONAZEPAM 1 MG
1.5 TABLET ORAL AT BEDTIME
Refills: 0 | Status: DISCONTINUED | OUTPATIENT
Start: 2019-07-09 | End: 2019-07-13

## 2019-07-09 RX ADMIN — ZINC SULFATE TAB 220 MG (50 MG ZINC EQUIVALENT) 220 MILLIGRAM(S): 220 (50 ZN) TAB at 12:11

## 2019-07-09 RX ADMIN — CEFEPIME 100 MILLIGRAM(S): 1 INJECTION, POWDER, FOR SOLUTION INTRAMUSCULAR; INTRAVENOUS at 05:35

## 2019-07-09 RX ADMIN — Medication 3 MILLILITER(S): at 12:10

## 2019-07-09 RX ADMIN — Medication 1.5 MILLIGRAM(S): at 21:48

## 2019-07-09 RX ADMIN — TIZANIDINE 4 MILLIGRAM(S): 4 TABLET ORAL at 21:04

## 2019-07-09 RX ADMIN — Medication 1.5 MILLIGRAM(S): at 22:53

## 2019-07-09 RX ADMIN — GABAPENTIN 300 MILLIGRAM(S): 400 CAPSULE ORAL at 08:45

## 2019-07-09 RX ADMIN — HEPARIN SODIUM 5000 UNIT(S): 5000 INJECTION INTRAVENOUS; SUBCUTANEOUS at 05:35

## 2019-07-09 RX ADMIN — MIDODRINE HYDROCHLORIDE 5 MILLIGRAM(S): 2.5 TABLET ORAL at 05:35

## 2019-07-09 RX ADMIN — MIDODRINE HYDROCHLORIDE 5 MILLIGRAM(S): 2.5 TABLET ORAL at 21:48

## 2019-07-09 RX ADMIN — Medication 15 MILLILITER(S): at 12:10

## 2019-07-09 RX ADMIN — ERGOCALCIFEROL 5000 UNIT(S): 1.25 CAPSULE ORAL at 14:23

## 2019-07-09 RX ADMIN — Medication 1 DROP(S): at 12:11

## 2019-07-09 RX ADMIN — Medication 1: at 23:46

## 2019-07-09 RX ADMIN — HEPARIN SODIUM 5000 UNIT(S): 5000 INJECTION INTRAVENOUS; SUBCUTANEOUS at 17:58

## 2019-07-09 RX ADMIN — FAMOTIDINE 20 MILLIGRAM(S): 10 INJECTION INTRAVENOUS at 12:10

## 2019-07-09 RX ADMIN — CHLORHEXIDINE GLUCONATE 1 APPLICATION(S): 213 SOLUTION TOPICAL at 12:12

## 2019-07-09 RX ADMIN — Medication 1: at 17:59

## 2019-07-09 RX ADMIN — QUETIAPINE FUMARATE 25 MILLIGRAM(S): 200 TABLET, FILM COATED ORAL at 21:48

## 2019-07-09 RX ADMIN — Medication 100 MILLIGRAM(S): at 04:03

## 2019-07-09 RX ADMIN — MIDODRINE HYDROCHLORIDE 5 MILLIGRAM(S): 2.5 TABLET ORAL at 14:05

## 2019-07-09 RX ADMIN — Medication 1 DROP(S): at 17:58

## 2019-07-09 RX ADMIN — Medication 500 MILLIGRAM(S): at 12:10

## 2019-07-09 RX ADMIN — BUDESONIDE AND FORMOTEROL FUMARATE DIHYDRATE 2 PUFF(S): 160; 4.5 AEROSOL RESPIRATORY (INHALATION) at 05:35

## 2019-07-09 RX ADMIN — Medication 3 MILLILITER(S): at 17:57

## 2019-07-09 RX ADMIN — TIZANIDINE 4 MILLIGRAM(S): 4 TABLET ORAL at 08:45

## 2019-07-09 RX ADMIN — Medication 300 MILLIGRAM(S): at 12:10

## 2019-07-09 RX ADMIN — Medication 1 DROP(S): at 23:44

## 2019-07-09 RX ADMIN — Medication 3 MILLILITER(S): at 23:44

## 2019-07-09 RX ADMIN — Medication 3 MILLILITER(S): at 05:35

## 2019-07-09 RX ADMIN — GABAPENTIN 300 MILLIGRAM(S): 400 CAPSULE ORAL at 21:04

## 2019-07-09 NOTE — PROGRESS NOTE ADULT - SUBJECTIVE AND OBJECTIVE BOX
---___---___---___---___---___---___ ---___---___---___---___---___---___---___---___---                  M E D I C A L   A T T E N D I N G   P R O G R E S S   N O T E  ---___---___---___---___---___---___ ---___---___---___---___---___---___---___---___---        ================================================    ++CHIEF COMPLAINT:   Patient is a 69y old  Female who presents with a chief complaint of PEG tube dislodgement, fever, leukocytosis (2019 17:03)    fever resolved trang still present   states that patient unable to focus     ---___---___---___---___---___---  PAST MEDICAL / Surgical  HISTORY:  PAST MEDICAL & SURGICAL HISTORY:  Type 2 diabetes mellitus  Dementia  DVT, lower extremity  CVA (cerebral vascular accident)  Fatty pancreas  PNA (pneumonia)  Pulmonary HTN  IGT (impaired glucose tolerance)  Ulcerative colitis  Acid reflux  Anxiety  Depression  Mouth sores  HLD (hyperlipidemia)  Asthma  S/P percutaneous endoscopic gastrostomy (PEG) tube placement: for dysphagia  Humeral head fracture  H/O: hysterectomy  H/O cataract extraction, left  History of knee replacement      ---___---___---___---___---___---  FAMILY HISTORY:   FAMILY HISTORY:  Family history of dementia (Grandparent)  Family history of colon cancer (Grandparent)        ---___---___---___---___---___---  ALLERGIES:   Allergies    ASA; dye contrast (Anaphylaxis)  aspirin (Short breath)  divalproex sodium (Other (Unknown))  Flowers and Plants (Short breath)  Haldol (Other (Unknown))  penicillin (Short breath; Rash)  sulfa drugs (Short breath; Rash)  vancomycin (Rash; Urticaria; Hives)  Xanax (Other (Unknown))    Intolerances        ---___---___---___---___---___---  MEDICATIONS:  MEDICATIONS  (STANDING):  ALBUTerol    90 MICROgram(s) HFA Inhaler 1 Puff(s) Inhalation every 4 hours  ALBUTerol/ipratropium for Nebulization 3 milliLiter(s) Nebulizer every 6 hours  ALBUTerol/ipratropium for Nebulization. 3 milliLiter(s) Nebulizer four times a day  ascorbic acid 500 milliGRAM(s) Oral daily  buDESOnide  80 MICROgram(s)/formoterol 4.5 MICROgram(s) Inhaler 2 Puff(s) Inhalation two times a day  chlorhexidine 2% Cloths 1 Application(s) Topical daily  clonazePAM  Tablet 1.5 milliGRAM(s) Oral at bedtime  dextrose 5%. 1000 milliLiter(s) (50 mL/Hr) IV Continuous <Continuous>  dextrose 50% Injectable 12.5 Gram(s) IV Push once  dextrose 50% Injectable 25 Gram(s) IV Push once  dextrose 50% Injectable 25 Gram(s) IV Push once  diphenhydrAMINE   Injectable 25 milliGRAM(s) IV Push two times a day  ergocalciferol Drops 5000 Unit(s) Oral daily  famotidine    Tablet 20 milliGRAM(s) Oral daily  ferrous    sulfate Liquid 300 milliGRAM(s) Enteral Tube daily  gabapentin 300 milliGRAM(s) Oral two times a day  heparin  Injectable 5000 Unit(s) SubCutaneous every 12 hours  insulin lispro (HumaLOG) corrective regimen sliding scale   SubCutaneous every 6 hours  Medical Marijuana Awake Oil 2 milliLiter(s) 2 milliLiter(s) Enteral Tube <User Schedule>  Medical Marijuana Calm Oil 2 milliLiter(s) 2 milliLiter(s) Enteral Tube <User Schedule>  Medical Marijuana [place] Oil 2 milliLiter(s) 2 milliLiter(s) Enteral Tube <User Schedule>  midodrine 5 milliGRAM(s) Oral every 8 hours  multivitamin/minerals/iron Oral Solution (CENTRUM) 15 milliLiter(s) Oral daily  QUEtiapine 25 milliGRAM(s) Oral daily  tiotropium 18 MICROgram(s) Capsule 1 Capsule(s) Inhalation daily  tiotropium 18 MICROgram(s) Capsule 1 Capsule(s) Inhalation daily  tiZANidine 4 milliGRAM(s) Oral <User Schedule>  zinc sulfate 220 milliGRAM(s) Oral daily    MEDICATIONS  (PRN):  acetaminophen  Suppository .. 650 milliGRAM(s) Rectal every 6 hours PRN Temp greater or equal to 38C (100.4F), Mild Pain (1 - 3)  carboxymethylcellulose 0.5% (Preservative-Free) Ophthalmic Solution 1 Drop(s) Both EYES three times a day PRN dry eyes  dextrose 40% Gel 15 Gram(s) Oral once PRN Blood Glucose LESS THAN 70 milliGRAM(s)/deciliter  glucagon  Injectable 1 milliGRAM(s) IntraMuscular once PRN Glucose LESS THAN 70 milligrams/deciliter  nystatin Powder 1 Application(s) Topical three times a day PRN under breasts      ---___---___---___---___---___---  REVIEW OF SYSTEM:    unable to obtain   ---___---___---___---___---___---  VITAL SIGNS:  69y , CAPILLARY BLOOD GLUCOSE      POCT Blood Glucose.: 102 mg/dL (2019 06:19)    T(C): 36.4 (19 @ 06:24), Max: 36.9 (19 @ 21:36)  HR: 102 (19 @ 06:24) (102 - 105)  BP: 107/67 (19 @ 06:24) (92/55 - 153/77)  RR: 18 (19 @ 06:24) (18 - 19)  SpO2: 95% (19 @ 06:24) (95% - 100%)  ---___---___---___---___---___---  PHYSICAL EXAM:    GEN: A&O X 0 , NAD , comfortable  HEENT: NCAT, PERRL, MMM, hearing intact patient unable to fully cooperate with eye exam but nystagmus not seen  Neck: supple , no JVD  CVS: S1S2 , regular , No M/R/G appreciated  PULM: CTA B/L,  decreased breath sound noted left side   ABD.: soft. non tender, non distended,  bowel sounds present peg noted   Extrem: intact pulses ,  edema   Derm: No rash , no ecchymoses stage 4 sacral decubitus noted       ---___---___---___---___---___---            LAB AND IMAGIN.1    10.1  )-----------( 377      ( 2019 10:24 )             20.8               07-08    130<L>  |  98  |  76<H>  ----------------------------<  124<H>  4.8   |  21<L>  |  1.84<H>    Ca    9.2      2019 10:24                                  [All pertinent / recent Imaging reviewed]         ---___---___---___---___---___---___ ---___---___---___---___---                         A S S E S S M E N T   A N D   P L A N :      HEALTH ISSUES - PROBLEM Dx:   TRANG continue recommendations as per renal   fever resolved   pneumonia ID to dc abx if no longer necessary   chronic pain on medical marijuana and gabapentin  asthma on budesonide  dm2 secondary to chronic steroid use insulin and stable  agitation on klonopin and seroquel   -GI/DVT Prophylaxis. continue heparin   dry eyes add eye moiturizer    --------------------------------------------  Case discussed with   Education given on   ___________________________  Thank you,  Deniz Bolden  [phone]

## 2019-07-09 NOTE — PROGRESS NOTE ADULT - ASSESSMENT
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 since admission, went to IR to have PEG replaced.  TRANG and hyperkalemia.    1. TRAGN likely multifactorial and the vancomycin was likely a contributory factor  .    2. Hyponatremia from TRANG  3. Nephrotic   4. SP Hyperkalemia   5. Anemia.     RECOMMEND:  -Off IVF now   - Awaiting repeat CBC p Blood xfusion.  Lasix given yesterday without rash.    - On  Nephro till the creatinine improves    - Hold the vancomycin.  Trend its level please   - Renal dose all medications for a GFR <25

## 2019-07-09 NOTE — PROGRESS NOTE ADULT - PROVIDER SPECIALTY LIST ADULT
Family Medicine Referring physician:David Gloria MD    Reason for referral  1. Chronic hypertension (MFM)    2. Elderly multigravida (MFM)        HISTORY OF PRESENT ILLNESS:   Ambar Holt is a 35 year old  female seen at 8w5d  gestation for the issues noted above.    CURRENT MEDICATIONS:  Current Outpatient Prescriptions   Medication Sig Dispense Refill   • NIFEdipine (ADALAT CC) 30 MG 24 hr tablet Take 2 tablets by mouth daily. 30 tablet 0   • Prenatal-FeCbn-FeAspGl-FA-Omeg (ULTIMATECARE ONE) 27-1 MG capsule Take 1 capsule by mouth daily. 100 capsule 3     No current facility-administered medications for this visit.        PAST OB HISTORY:   Obstetric History       T2      L2     SAB0   TAB0   Ectopic0   Molar0   Multiple0   Live Births2        Allergies as of 2018 - Reviewed 2018   Allergen Reaction Noted   • Penicillins RASH 2012   • Sulfa antibiotics RASH 2012   • Labetalol HEADACHES and Other (See Comments) 2016       PAST MEDICAL HISTORY:     Fibrocystic breast disease                                    Endometriosis                                                 Hypertension                                                    Comment: X 1yr    Abnormal Pap smear of cervix                                  PAST SURGICAL HISTORY:     CHOLECYSTECTOMY                                 2011    LIVER BIOPSY                                    2011      Comment: benign, 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    INSERT INTRAUTERINE DEVICE                      2011      Comment: Mirena    APPENDECTOMY                                                    Comment: with pregnancy    TONSILLECTOMY AND ADENOIDECTOMY                               BUNIONECTOMY                                    2013      Comment: right    LAPAROSCOPY UNLISTED PROC                       2000    BREAST BIOPSY                                                    Comment: right    FOOT SURGERY                                    08/26/201*      Comment: excision of Quarles's neuroma     FOOT SURGERY                                    2015            Comment: bunion/hammertoe    CRYOTHERAPY                                     2006            Comment: cryo of the cervix for abnormal pap    FAMILY HISTORY:  Family History   Problem Relation Age of Onset   • Hypertension Father    • Seizures Father      born with hydrocephalus   • Cancer Paternal Aunt      ovarian   • Musculoskeletal Mother      fibromyalgia   • Hypertension Brother    • Diabetes Maternal Grandmother    • Cancer Maternal Aunt 3     leukemia   • OTHER Maternal Uncle 7     Polio   • Genetic Neg Hx      cousin viviana's       SOCIAL HISTORY:   Social History   Substance Use Topics   • Smoking status: Never Smoker   • Smokeless tobacco: Never Used   • Alcohol use No      Comment: pt aware not to use in pregnancy       PHYSICAL EXAMINATION:  VITAL SIGNS:    Visit Vitals  /82   Ht 5' 8\" (1.727 m)   Wt 98 kg   LMP 11/19/2017 (Exact Date)   BMI 32.84 kg/m²     HEAD:  No gross obvious  deformity.    Eyes; sclera clear and white  :  Uterine:  Size/fundal height: Below umbilicus    MUSCULOSKELETAL:  Gait - ambulates without difficulty.    SKIN:  No rashes or lesions visible on extremities or facial region    PSYCHIATRIC:  Judgement and mood appears appropriate.      LABS        ULTRASOUND  Viable 8 week 5 day gestation    ASSESSMENT AND PLAN:   The patient was seen in referral today at 8w5d  for;    PROBLEM 1.   1. Chronic hypertension (MFM)    2. Elderly multigravida (MFM)    Maternal: Hypertension-chronic (2017)  The patient was counseled on the following  1. Chronic hypertension is hypertension that is present or observable before 20 weeks  2. Chronic hypertension is associated with increased risk of preeclampsia, severe hypertension, stroke, fetal growth restriction, placental abruption, and  stillbirth  Plan  1. Baseline labs- serum creatinine, electrolytes, SGOT, SGPT, platelet count, protein creatinine ratio (ECG if HTN for more than4 yrs)  2. Antihypertensive medication is recommended for SBP >159 mm Hg or DBP > 104 mm Hg or evidence of end organ damage.  -Therapeutic goal is -160 mm Hg and DBP  mm Hg  -If evidence of left ventricular hypertrophy, chronic renal insufficiency, pre-gestational diabetes,  stroke, myocardial infarction would attempt to SBP<140mm Hg keep DBP<90 mm Hg  -Labetalol, nifedipine, or methyldopa are drugs of choice  3. Low-dose aspirin 81 mg per day after 12 weeks   4. Growth scans and protein/Cr ratio every 3 weeks starting at 20 weeks  5. Weekly BPP starting at 32 weeks   NOTE: Superimposed preeclampsia will  be assumed if    -sudden increase in BP or excalation of  meds needed to control BP   -new onset or sudden increase in proteinuria  6. Genetic counseling/free fetal DNA offered  7. Fetal echo at 24 weeks       Source: Obstetric clinical algorithms, 2nd edition. 2017      Total time of today's office visit was 30, greater than 50% was spent patient counseling and care planning. Which consisted of   A.Reviewing the patient's chart  B. Interpreting any of following performed prior which have been highlighted    Laboratory results    Ultrasound  C. A physician patient conversation in the counseling room discussing  Hypertension and AMA  Formulating with the patient's agreement the care plan which is documented above

## 2019-07-09 NOTE — PROGRESS NOTE ADULT - SUBJECTIVE AND OBJECTIVE BOX
MYRIAM WHITE:[id number],   69yFemale followed for:  ASA; dye contrast (Anaphylaxis)  aspirin (Short breath)  divalproex sodium (Other (Unknown))  Flowers and Plants (Short breath)  Haldol (Other (Unknown))  penicillin (Short breath; Rash)  sulfa drugs (Short breath; Rash)  vancomycin (Rash; Urticaria; Hives)  Xanax (Other (Unknown))    PAST MEDICAL & SURGICAL HISTORY:  Type 2 diabetes mellitus  Dementia  DVT, lower extremity  CVA (cerebral vascular accident)  Fatty pancreas  PNA (pneumonia)  Pulmonary HTN  IGT (impaired glucose tolerance)  Ulcerative colitis  Acid reflux  Anxiety  Depression  Mouth sores  HLD (hyperlipidemia)  Asthma  S/P percutaneous endoscopic gastrostomy (PEG) tube placement: for dysphagia  Humeral head fracture  H/O: hysterectomy  H/O cataract extraction, left  History of knee replacement    FAMILY HISTORY:  Family history of dementia (Grandparent)  Family history of colon cancer (Grandparent)    MEDICATIONS  (STANDING):  ALBUTerol    90 MICROgram(s) HFA Inhaler 1 Puff(s) Inhalation every 4 hours  ALBUTerol/ipratropium for Nebulization 3 milliLiter(s) Nebulizer every 6 hours  ALBUTerol/ipratropium for Nebulization. 3 milliLiter(s) Nebulizer four times a day  artificial  tears Solution 1 Drop(s) Both EYES four times a day  ascorbic acid 500 milliGRAM(s) Oral daily  buDESOnide  80 MICROgram(s)/formoterol 4.5 MICROgram(s) Inhaler 2 Puff(s) Inhalation two times a day  chlorhexidine 2% Cloths 1 Application(s) Topical daily  clonazePAM  Tablet 1.5 milliGRAM(s) Oral at bedtime  dextrose 5%. 1000 milliLiter(s) (50 mL/Hr) IV Continuous <Continuous>  dextrose 50% Injectable 12.5 Gram(s) IV Push once  dextrose 50% Injectable 25 Gram(s) IV Push once  dextrose 50% Injectable 25 Gram(s) IV Push once  diphenhydrAMINE   Injectable 25 milliGRAM(s) IV Push two times a day  ergocalciferol Drops 5000 Unit(s) Oral daily  famotidine    Tablet 20 milliGRAM(s) Oral daily  ferrous    sulfate Liquid 300 milliGRAM(s) Enteral Tube daily  gabapentin 300 milliGRAM(s) Oral two times a day  heparin  Injectable 5000 Unit(s) SubCutaneous every 12 hours  insulin lispro (HumaLOG) corrective regimen sliding scale   SubCutaneous every 6 hours  Medical Marijuana Awake Oil 2 milliLiter(s) 2 milliLiter(s) Enteral Tube <User Schedule>  Medical Marijuana Calm Oil 2 milliLiter(s) 2 milliLiter(s) Enteral Tube <User Schedule>  Medical Marijuana [place] Oil 2 milliLiter(s) 2 milliLiter(s) Enteral Tube <User Schedule>  midodrine 5 milliGRAM(s) Oral every 8 hours  multivitamin/minerals/iron Oral Solution (CENTRUM) 15 milliLiter(s) Oral daily  QUEtiapine 25 milliGRAM(s) Oral daily  tiotropium 18 MICROgram(s) Capsule 1 Capsule(s) Inhalation daily  tiotropium 18 MICROgram(s) Capsule 1 Capsule(s) Inhalation daily  tiZANidine 4 milliGRAM(s) Oral <User Schedule>  zinc sulfate 220 milliGRAM(s) Oral daily    MEDICATIONS  (PRN):  acetaminophen  Suppository .. 650 milliGRAM(s) Rectal every 6 hours PRN Temp greater or equal to 38C (100.4F), Mild Pain (1 - 3)  carboxymethylcellulose 0.5% (Preservative-Free) Ophthalmic Solution 1 Drop(s) Both EYES three times a day PRN dry eyes  dextrose 40% Gel 15 Gram(s) Oral once PRN Blood Glucose LESS THAN 70 milliGRAM(s)/deciliter  glucagon  Injectable 1 milliGRAM(s) IntraMuscular once PRN Glucose LESS THAN 70 milligrams/deciliter  nystatin Powder 1 Application(s) Topical three times a day PRN under breasts    T(F): 97.5 (07-09-19 @ 06:24), Max: 98.4 (07-08-19 @ 21:36)  HR: 102 (07-09-19 @ 06:24) (102 - 105)  BP: 107/67 (07-09-19 @ 06:24) (92/55 - 153/77)  RR: 18 (07-09-19 @ 06:24) (18 - 19)  SpO2: 95% (07-09-19 @ 06:24) (95% - 100%)  Wt(kg): --  ,   I&O's Summary    08 Jul 2019 07:01  -  09 Jul 2019 07:00  --------------------------------------------------------  IN: 1580 mL / OUT: 1300 mL / NET: 280 mL    nc/at  s1s2  cta  soft, nt, nd no guarding or rebound  no c/c/e    Labs                     9.7    12.2  )-----------( 471      ( 09 Jul 2019 10:27 )             29.1               07-09    131<L>  |  98  |  79<H>  ----------------------------<  120<H>  4.1   |  21<L>  |  1.95<H>    Ca    9.6      09 Jul 2019 09:38

## 2019-07-09 NOTE — PROGRESS NOTE ADULT - SUBJECTIVE AND OBJECTIVE BOX
HPI:  Patient seen and examined at bedside. Resting comfortably.     Review Of Systems:     unable to assess due to advanced dementia    Medications:  acetaminophen  Suppository .. 650 milliGRAM(s) Rectal every 6 hours PRN  ALBUTerol/ipratropium for Nebulization. 3 milliLiter(s) Nebulizer four times a day  artificial  tears Solution 1 Drop(s) Both EYES four times a day  ascorbic acid 500 milliGRAM(s) Oral daily  buDESOnide  80 MICROgram(s)/formoterol 4.5 MICROgram(s) Inhaler 2 Puff(s) Inhalation two times a day  carboxymethylcellulose 0.5% (Preservative-Free) Ophthalmic Solution 1 Drop(s) Both EYES three times a day PRN  chlorhexidine 2% Cloths 1 Application(s) Topical daily  clonazePAM  Tablet 1.5 milliGRAM(s) Oral at bedtime  dextrose 40% Gel 15 Gram(s) Oral once PRN  dextrose 5%. 1000 milliLiter(s) IV Continuous <Continuous>  dextrose 50% Injectable 12.5 Gram(s) IV Push once  dextrose 50% Injectable 25 Gram(s) IV Push once  dextrose 50% Injectable 25 Gram(s) IV Push once  ergocalciferol Drops 5000 Unit(s) Oral daily  famotidine    Tablet 20 milliGRAM(s) Oral daily  ferrous    sulfate Liquid 300 milliGRAM(s) Enteral Tube daily  gabapentin 300 milliGRAM(s) Oral two times a day  glucagon  Injectable 1 milliGRAM(s) IntraMuscular once PRN  heparin  Injectable 5000 Unit(s) SubCutaneous every 12 hours  insulin lispro (HumaLOG) corrective regimen sliding scale   SubCutaneous every 6 hours  Medical Marijuana Awake Oil 2 milliLiter(s) 2 milliLiter(s) Enteral Tube <User Schedule>  Medical Marijuana Calm Oil 2 milliLiter(s) 2 milliLiter(s) Enteral Tube <User Schedule>  Medical Marijuana [place] Oil 2 milliLiter(s) 2 milliLiter(s) Enteral Tube <User Schedule>  midodrine 5 milliGRAM(s) Oral every 8 hours  multivitamin/minerals/iron Oral Solution (CENTRUM) 15 milliLiter(s) Oral daily  nystatin Powder 1 Application(s) Topical three times a day PRN  QUEtiapine 25 milliGRAM(s) Oral daily  tiZANidine 4 milliGRAM(s) Oral <User Schedule>  zinc sulfate 220 milliGRAM(s) Oral daily    PAST MEDICAL & SURGICAL HISTORY:  Type 2 diabetes mellitus  Dementia  DVT, lower extremity  CVA (cerebral vascular accident)  Fatty pancreas  PNA (pneumonia)  Pulmonary HTN  IGT (impaired glucose tolerance)  Ulcerative colitis  Acid reflux  Anxiety  Depression  Mouth sores  HLD (hyperlipidemia)  Asthma  S/P percutaneous endoscopic gastrostomy (PEG) tube placement: for dysphagia  Humeral head fracture  H/O: hysterectomy  H/O cataract extraction, left  History of knee replacement    Vitals:  T(C): 36.6 (07-09-19 @ 12:33), Max: 36.9 (07-08-19 @ 21:36)  HR: 92 (07-09-19 @ 12:33) (92 - 105)  BP: 93/50 (07-09-19 @ 12:33) (92/55 - 153/77)  BP(mean): --  RR: 18 (07-09-19 @ 12:33) (18 - 19)  SpO2: 98% (07-09-19 @ 12:33) (95% - 100%)  Wt(kg): --  Daily     Daily   I&O's Summary    08 Jul 2019 07:01  -  09 Jul 2019 07:00  --------------------------------------------------------  IN: 1580 mL / OUT: 1300 mL / NET: 280 mL    09 Jul 2019 07:01  -  09 Jul 2019 18:36  --------------------------------------------------------  IN: 880 mL / OUT: 0 mL / NET: 880 mL        Physical Exam:  Appearance: functional quadriplegia   Eyes: PERRLA, EOMI, pink conjunctiva, no scleral icterus   HENT: Normal oral mucosa  Cardiovascular: RRR, S1, S2, no murmur, rub, or gallop; + edema in hands and feet; +JVD  Procedural Access Site: Clean, dry, intact, without hematoma  Respiratory: bilateral air entry; poor inspiratory effort  Gastrointestinal: Soft, non-tender, non-distended, BS+, +PEG  Musculoskeletal: +contracted  Neurologic: functional quadriplegia   Lymphatic: No lymphadenopathy  Psychiatry: advanced dementia  Skin: No rashes, ecchymoses, or cyanosis on extremities                          9.7    12.2  )-----------( 471      ( 09 Jul 2019 10:27 )             29.1     07-09    131<L>  |  98  |  79<H>  ----------------------------<  120<H>  4.1   |  21<L>  |  1.95<H>    Ca    9.6      09 Jul 2019 09:38                    ECG:    Echo:    Stress Testing:     Cath:    Imaging:    Interpretation of Telemetry:

## 2019-07-09 NOTE — PROGRESS NOTE ADULT - ASSESSMENT
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  Fever and leukocytosis now. Abnormal CT chest.    , patient found with hypoxia and AMS suspect from aspiration event.            aspiration/pna     all cultures negative and temps better    to dc ab today  hopefully creat will come down off vanco   -

## 2019-07-09 NOTE — PROGRESS NOTE ADULT - ASSESSMENT
May continue tube feeds, when stable with aspiraiton precautions. will not tolerate jejunal tube through gastrostomy, will clog.  Pt with poor prognosis, bedbound and contracted.  Family wishes appreciated.

## 2019-07-09 NOTE — PROGRESS NOTE ADULT - SUBJECTIVE AND OBJECTIVE BOX
infectious diseases progress note:    Patient is a 69y old  Female who presents with a chief complaint of PEG tube dislodgement, fever, leukocytosis (08 Jul 2019 17:03)        Gastrostomy malfunction           Allergies    ASA; dye contrast (Anaphylaxis)  aspirin (Short breath)  divalproex sodium (Other (Unknown))  Flowers and Plants (Short breath)  Haldol (Other (Unknown))  penicillin (Short breath; Rash)  sulfa drugs (Short breath; Rash)  vancomycin (Rash; Urticaria; Hives)  Xanax (Other (Unknown))    Intolerances        ANTIBIOTICS/RELEVANT:  antimicrobials    immunologic:    OTHER:  acetaminophen  Suppository .. 650 milliGRAM(s) Rectal every 6 hours PRN  ALBUTerol    90 MICROgram(s) HFA Inhaler 1 Puff(s) Inhalation every 4 hours  ALBUTerol/ipratropium for Nebulization 3 milliLiter(s) Nebulizer every 6 hours  ALBUTerol/ipratropium for Nebulization. 3 milliLiter(s) Nebulizer four times a day  ascorbic acid 500 milliGRAM(s) Oral daily  buDESOnide  80 MICROgram(s)/formoterol 4.5 MICROgram(s) Inhaler 2 Puff(s) Inhalation two times a day  carboxymethylcellulose 0.5% (Preservative-Free) Ophthalmic Solution 1 Drop(s) Both EYES three times a day PRN  chlorhexidine 2% Cloths 1 Application(s) Topical daily  clonazePAM  Tablet 1.5 milliGRAM(s) Oral at bedtime  dextrose 40% Gel 15 Gram(s) Oral once PRN  dextrose 5%. 1000 milliLiter(s) IV Continuous <Continuous>  dextrose 50% Injectable 12.5 Gram(s) IV Push once  dextrose 50% Injectable 25 Gram(s) IV Push once  dextrose 50% Injectable 25 Gram(s) IV Push once  diphenhydrAMINE   Injectable 25 milliGRAM(s) IV Push two times a day  ergocalciferol Drops 5000 Unit(s) Oral daily  famotidine    Tablet 20 milliGRAM(s) Oral daily  ferrous    sulfate Liquid 300 milliGRAM(s) Enteral Tube daily  gabapentin 300 milliGRAM(s) Oral two times a day  glucagon  Injectable 1 milliGRAM(s) IntraMuscular once PRN  heparin  Injectable 5000 Unit(s) SubCutaneous every 12 hours  insulin lispro (HumaLOG) corrective regimen sliding scale   SubCutaneous every 6 hours  Medical Marijuana Awake Oil 2 milliLiter(s) 2 milliLiter(s) Enteral Tube <User Schedule>  Medical Marijuana Calm Oil 2 milliLiter(s) 2 milliLiter(s) Enteral Tube <User Schedule>  Medical Marijuana [place] Oil 2 milliLiter(s) 2 milliLiter(s) Enteral Tube <User Schedule>  midodrine 5 milliGRAM(s) Oral every 8 hours  multivitamin/minerals/iron Oral Solution (CENTRUM) 15 milliLiter(s) Oral daily  nystatin Powder 1 Application(s) Topical three times a day PRN  QUEtiapine 25 milliGRAM(s) Oral daily  tiotropium 18 MICROgram(s) Capsule 1 Capsule(s) Inhalation daily  tiotropium 18 MICROgram(s) Capsule 1 Capsule(s) Inhalation daily  tiZANidine 4 milliGRAM(s) Oral <User Schedule>  zinc sulfate 220 milliGRAM(s) Oral daily      Objective:  Vital Signs Last 24 Hrs  T(C): 36.4 (09 Jul 2019 06:24), Max: 36.9 (08 Jul 2019 21:36)  T(F): 97.5 (09 Jul 2019 06:24), Max: 98.4 (08 Jul 2019 21:36)  HR: 102 (09 Jul 2019 06:24) (102 - 105)  BP: 107/67 (09 Jul 2019 06:24) (92/55 - 153/77)  BP(mean): --  RR: 18 (09 Jul 2019 06:24) (18 - 19)  SpO2: 95% (09 Jul 2019 06:24) (95% - 100%)       Eyes:JACQUELIN, EOMI  Ear/Nose/Throat: no oral lesion, no sinus tenderness on percussion	  Neck:no JVD, no lymphadenopathy, supple  Respiratory: CTA maryjane  Cardiovascular: S1S2 RRR, no murmurs  Gastrointestinal:soft, (+) BS, no HSM           LABS:                        7.1    10.1  )-----------( 377      ( 08 Jul 2019 10:24 )             20.8     07-08    130<L>  |  98  |  76<H>  ----------------------------<  124<H>  4.8   |  21<L>  |  1.84<H>    Ca    9.2      08 Jul 2019 10:24              MICROBIOLOGY:    RECENT CULTURES:  07-05 @ 23:36 .Blood                No growth to date.    07-05 @ 13:06 .Blood                No growth to date.          RESPIRATORY CULTURES:              RADIOLOGY & ADDITIONAL STUDIES:        Pager [phone]  After 5 pm/weekends or if no response :[phone]

## 2019-07-09 NOTE — PROGRESS NOTE ADULT - SUBJECTIVE AND OBJECTIVE BOX
[facility] [phone]        Patient seen and examined in bed.  She was more alert this am         MEDICATIONS  (STANDING):  ALBUTerol    90 MICROgram(s) HFA Inhaler 1 Puff(s) Inhalation every 4 hours  ALBUTerol/ipratropium for Nebulization 3 milliLiter(s) Nebulizer every 6 hours  ALBUTerol/ipratropium for Nebulization. 3 milliLiter(s) Nebulizer four times a day  artificial  tears Solution 1 Drop(s) Both EYES four times a day  ascorbic acid 500 milliGRAM(s) Oral daily  buDESOnide  80 MICROgram(s)/formoterol 4.5 MICROgram(s) Inhaler 2 Puff(s) Inhalation two times a day  chlorhexidine 2% Cloths 1 Application(s) Topical daily  clonazePAM  Tablet 1.5 milliGRAM(s) Oral at bedtime  dextrose 5%. 1000 milliLiter(s) (50 mL/Hr) IV Continuous <Continuous>  dextrose 50% Injectable 12.5 Gram(s) IV Push once  dextrose 50% Injectable 25 Gram(s) IV Push once  dextrose 50% Injectable 25 Gram(s) IV Push once  diphenhydrAMINE   Injectable 25 milliGRAM(s) IV Push two times a day  ergocalciferol Drops 5000 Unit(s) Oral daily  famotidine    Tablet 20 milliGRAM(s) Oral daily  ferrous    sulfate Liquid 300 milliGRAM(s) Enteral Tube daily  gabapentin 300 milliGRAM(s) Oral two times a day  heparin  Injectable 5000 Unit(s) SubCutaneous every 12 hours  insulin lispro (HumaLOG) corrective regimen sliding scale   SubCutaneous every 6 hours  Medical Marijuana Awake Oil 2 milliLiter(s) 2 milliLiter(s) Enteral Tube <User Schedule>  Medical Marijuana Calm Oil 2 milliLiter(s) 2 milliLiter(s) Enteral Tube <User Schedule>  Medical Marijuana [place] Oil 2 milliLiter(s) 2 milliLiter(s) Enteral Tube <User Schedule>  midodrine 5 milliGRAM(s) Oral every 8 hours  multivitamin/minerals/iron Oral Solution (CENTRUM) 15 milliLiter(s) Oral daily  QUEtiapine 25 milliGRAM(s) Oral daily  tiotropium 18 MICROgram(s) Capsule 1 Capsule(s) Inhalation daily  tiotropium 18 MICROgram(s) Capsule 1 Capsule(s) Inhalation daily  tiZANidine 4 milliGRAM(s) Oral <User Schedule>  zinc sulfate 220 milliGRAM(s) Oral daily      VITAL:  T(C): , Max: 36.9 (07-08-19 @ 21:36)  T(F): , Max: 98.4 (07-08-19 @ 21:36)  HR: 102 (07-09-19 @ 06:24)  BP: 107/67 (07-09-19 @ 06:24)  BP(mean): --  RR: 18 (07-09-19 @ 06:24)  SpO2: 95% (07-09-19 @ 06:24)  Wt(kg): --    I and O's:    07-08 @ 07:01  -  07-09 @ 07:00  --------------------------------------------------------  IN: 1580 mL / OUT: 1300 mL / NET: 280 mL        Weight (kg): 37.6 (07-09 @ 05:03)    PHYSICAL EXAM:    Constitutional: NAD; cachetic   Neck:  No JVD  Respiratory: poor effort   Cardiovascular: S1 and S2  Gastrointestinal: BS+, soft, NT/ND  Extremities: + flank  peripheral edema  Neurological:  unable   : +  Blackman  Skin: No rashes  Access: Not applicable    LABS:                        7.1    10.1  )-----------( 377      ( 08 Jul 2019 10:24 )             20.8     07-08    130<L>  |  98  |  76<H>  ----------------------------<  124<H>  4.8   |  21<L>  |  1.84<H>    Ca    9.2      08 Jul 2019 10:24            Urine Studies:          RADIOLOGY & ADDITIONAL STUDIES:

## 2019-07-09 NOTE — PROGRESS NOTE ADULT - ASSESSMENT
Patient is 69 year-old woman with advanced dementia and functional quadriplegia presents with fevers, leukocytosis in the setting of PEG dislodgement. Patient with acute kidney injury that is likely multifactorial.   No evidence of decompensated heart failure - respirations are unlabored and patient is breathing comfortably on room air.    Antibiotics per ID.  PEG management and feeds per GI/nutrition.    Appreciate nephrology input:  Patient euvolemic on today's exam after blood transfusion and Lasix yesterday.  Trend daily labs. Avoid nephrotoxic agents.

## 2019-07-09 NOTE — PROGRESS NOTE ADULT - SUBJECTIVE AND OBJECTIVE BOX
PULMONARY PROGRESS NOTE    MYRIAM HEATH  MRN-[id number]    Patient is a 69y old  Female who presents with a chief complaint of PEG tube dislodgement, fever, leukocytosis (09 Jul 2019 06:48)      HPI:  -  -    ROS:   -    ACTIVE MEDICATION LIST:  MEDICATIONS  (STANDING):  ALBUTerol    90 MICROgram(s) HFA Inhaler 1 Puff(s) Inhalation every 4 hours  ALBUTerol/ipratropium for Nebulization 3 milliLiter(s) Nebulizer every 6 hours  ALBUTerol/ipratropium for Nebulization. 3 milliLiter(s) Nebulizer four times a day  artificial  tears Solution 1 Drop(s) Both EYES four times a day  ascorbic acid 500 milliGRAM(s) Oral daily  buDESOnide  80 MICROgram(s)/formoterol 4.5 MICROgram(s) Inhaler 2 Puff(s) Inhalation two times a day  chlorhexidine 2% Cloths 1 Application(s) Topical daily  clonazePAM  Tablet 1.5 milliGRAM(s) Oral at bedtime  dextrose 5%. 1000 milliLiter(s) (50 mL/Hr) IV Continuous <Continuous>  dextrose 50% Injectable 12.5 Gram(s) IV Push once  dextrose 50% Injectable 25 Gram(s) IV Push once  dextrose 50% Injectable 25 Gram(s) IV Push once  diphenhydrAMINE   Injectable 25 milliGRAM(s) IV Push two times a day  ergocalciferol Drops 5000 Unit(s) Oral daily  famotidine    Tablet 20 milliGRAM(s) Oral daily  ferrous    sulfate Liquid 300 milliGRAM(s) Enteral Tube daily  gabapentin 300 milliGRAM(s) Oral two times a day  heparin  Injectable 5000 Unit(s) SubCutaneous every 12 hours  insulin lispro (HumaLOG) corrective regimen sliding scale   SubCutaneous every 6 hours  Medical Marijuana [place]ake Oil 2 milliLiter(s) 2 milliLiter(s) Enteral Tube <User Schedule>  Medical Marijuana Calm Oil 2 milliLiter(s) 2 milliLiter(s) Enteral Tube <User Schedule>  Medical Marijuana [place] Oil 2 milliLiter(s) 2 milliLiter(s) Enteral Tube <User Schedule>  midodrine 5 milliGRAM(s) Oral every 8 hours  multivitamin/minerals/iron Oral Solution (CENTRUM) 15 milliLiter(s) Oral daily  QUEtiapine 25 milliGRAM(s) Oral daily  tiotropium 18 MICROgram(s) Capsule 1 Capsule(s) Inhalation daily  tiotropium 18 MICROgram(s) Capsule 1 Capsule(s) Inhalation daily  tiZANidine 4 milliGRAM(s) Oral <User Schedule>  zinc sulfate 220 milliGRAM(s) Oral daily    MEDICATIONS  (PRN):  acetaminophen  Suppository .. 650 milliGRAM(s) Rectal every 6 hours PRN Temp greater or equal to 38C (100.4F), Mild Pain (1 - 3)  carboxymethylcellulose 0.5% (Preservative-Free) Ophthalmic Solution 1 Drop(s) Both EYES three times a day PRN dry eyes  dextrose 40% Gel 15 Gram(s) Oral once PRN Blood Glucose LESS THAN 70 milliGRAM(s)/deciliter  glucagon  Injectable 1 milliGRAM(s) IntraMuscular once PRN Glucose LESS THAN 70 milligrams/deciliter  nystatin Powder 1 Application(s) Topical three times a day PRN under breasts      EXAM:  Vital Signs Last 24 Hrs  T(C): 36.4 (09 Jul 2019 06:24), Max: 36.9 (08 Jul 2019 21:36)  T(F): 97.5 (09 Jul 2019 06:24), Max: 98.4 (08 Jul 2019 21:36)  HR: 102 (09 Jul 2019 06:24) (102 - 105)  BP: 107/67 (09 Jul 2019 06:24) (92/55 - 153/77)  BP(mean): --  RR: 18 (09 Jul 2019 06:24) (18 - 19)  SpO2: 95% (09 Jul 2019 06:24) (95% - 100%)    GENERAL: The patient is awake and alert in no apparent distress.     LUNGS: Clear to auscultation without wheezing, rales or rhonchi; respirations unlabored    HEART: Regular rate and rhythm without murmur.                            7.1    10.1  )-----------( 377      ( 08 Jul 2019 10:24 )             20.8       07-08    130<L>  |  98  |  76<H>  ----------------------------<  124<H>  4.8   |  21<L>  |  1.84<H>    Ca    9.2      08 Jul 2019 10:24          PROBLEM LIST:  69y Female with HEALTH ISSUES - PROBLEM Dx:  Fever: Fever  Type 2 diabetes mellitus without complication, without long-term current use of insulin:   Make sure you get your HgA1c checked every three months.  If you take oral diabetes medications, check your blood glucose two times a day.  If you take insulin, check your blood glucose before meals and at bedtime.  It&#x27;s important not to skip any meals.  Keep a log of your blood glucose results and always take it with you to your doctor appointments.  Keep a list of your current medications including injectables and over the counter medications and bring this medication list with you to all your doctor appointments.  If you have not seen your ophthalmologist this year call for appointment.  Check your feet daily for redness, sores, or openings. Do not self treat. If no improvement in two days call your primary care physician for an appointment.  Low blood sugar (hypoglycemia) is a blood sugar below 70mg/dl. Check your blood sugar if you feel signs/symptoms of hypoglycemia. If your blood sugar is below 70 take 15 grams of carbohydrates (ex 4 oz of apple juice, 3-4 glucose tablets, or 4-6 oz of regular soda) wait 15 minutes and repeat blood sugar to make sure it comes up above 70.  If your blood sugar is above 70 and you are due for a meal, have a meal.  If you are not due for a meal have a snack.  This snack helps keeps your blood sugar at a safe range.    Pressure injury of skin of sacral region, unspecified injury stage: Continue with wound care as instructed.   Maintain adequate nutrition, frequent repositioning , offloading with Zfloat boots.  Follow-up with your primary care physician and Wound Care Specialist within 1 week. Call for appointment.    Chronic deep vein thrombosis (DVT) of right lower extremity, unspecified vein: Take your &quot;blood thinners&quot; as prescribed.  Walking is encouraged, increase activity as tolerated.  If you develop new leg pain, swelling, and/or redness contact your healthcare provider.  If you develop new chest pain with difficulty breathing, a rapid heart rate and/or a feeling of passing out call emergency medical services 911.    Prophylactic measure: Prophylactic measure  Dementia without behavioral disturbance, unspecified dementia type: Continue with medications as prescribed by your doctor.  Maintain safe environment.  Maintain adequate nutrition, hydration.  Follow-up with your primary care physician within 1 week. Call for appointment.    Uncomplicated asthma, unspecified asthma severity, unspecified whether persistent: Stable.  Follow-up with your primary care physician within 1 week. Call for appointment.    Sepsis, due to unspecified organism: Take all antibiotics as ordered.  Call you Health care provider upon arrival home to make a one week follow up appointment.  If you develop fever, chills, malaise, or change in mental status call your Health Care Provider or go to the Emergency Department.  Nutrition is important, eat small frequent meals to help ensure you get adequate calories.  Do not stay in bed all day!  Increase your activity daily as tolerated.    PEG tube malfunction: PEG tube malfunction            RECS:        Please call with any questions.    Leigh Ann Resendez DO  [facility] Pulmonary/Sleep Medicine  [phone] PULMONARY PROGRESS NOTE    YMRIAM HEATH  MRN-[id number]    Patient is a 69y old  Female who presents with a chief complaint of PEG tube dislodgement, fever, leukocytosis (09 Jul 2019 06:48)      HPI:  afebrile overnight  getting chest PT    ROS:   -    ACTIVE MEDICATION LIST:  MEDICATIONS  (STANDING):  ALBUTerol    90 MICROgram(s) HFA Inhaler 1 Puff(s) Inhalation every 4 hours  ALBUTerol/ipratropium for Nebulization 3 milliLiter(s) Nebulizer every 6 hours  ALBUTerol/ipratropium for Nebulization. 3 milliLiter(s) Nebulizer four times a day  artificial  tears Solution 1 Drop(s) Both EYES four times a day  ascorbic acid 500 milliGRAM(s) Oral daily  buDESOnide  80 MICROgram(s)/formoterol 4.5 MICROgram(s) Inhaler 2 Puff(s) Inhalation two times a day  chlorhexidine 2% Cloths 1 Application(s) Topical daily  clonazePAM  Tablet 1.5 milliGRAM(s) Oral at bedtime  dextrose 5%. 1000 milliLiter(s) (50 mL/Hr) IV Continuous <Continuous>  dextrose 50% Injectable 12.5 Gram(s) IV Push once  dextrose 50% Injectable 25 Gram(s) IV Push once  dextrose 50% Injectable 25 Gram(s) IV Push once  diphenhydrAMINE   Injectable 25 milliGRAM(s) IV Push two times a day  ergocalciferol Drops 5000 Unit(s) Oral daily  famotidine    Tablet 20 milliGRAM(s) Oral daily  ferrous    sulfate Liquid 300 milliGRAM(s) Enteral Tube daily  gabapentin 300 milliGRAM(s) Oral two times a day  heparin  Injectable 5000 Unit(s) SubCutaneous every 12 hours  insulin lispro (HumaLOG) corrective regimen sliding scale   SubCutaneous every 6 hours  Medical Marijuana [place] Oil 2 milliLiter(s) 2 milliLiter(s) Enteral Tube <User Schedule>  Medical Marijuana Calm Oil 2 milliLiter(s) 2 milliLiter(s) Enteral Tube <User Schedule>  Medical Marijuana [place] Oil 2 milliLiter(s) 2 milliLiter(s) Enteral Tube <User Schedule>  midodrine 5 milliGRAM(s) Oral every 8 hours  multivitamin/minerals/iron Oral Solution (CENTRUM) 15 milliLiter(s) Oral daily  QUEtiapine 25 milliGRAM(s) Oral daily  tiotropium 18 MICROgram(s) Capsule 1 Capsule(s) Inhalation daily  tiotropium 18 MICROgram(s) Capsule 1 Capsule(s) Inhalation daily  tiZANidine 4 milliGRAM(s) Oral <User Schedule>  zinc sulfate 220 milliGRAM(s) Oral daily    MEDICATIONS  (PRN):  acetaminophen  Suppository .. 650 milliGRAM(s) Rectal every 6 hours PRN Temp greater or equal to 38C (100.4F), Mild Pain (1 - 3)  carboxymethylcellulose 0.5% (Preservative-Free) Ophthalmic Solution 1 Drop(s) Both EYES three times a day PRN dry eyes  dextrose 40% Gel 15 Gram(s) Oral once PRN Blood Glucose LESS THAN 70 milliGRAM(s)/deciliter  glucagon  Injectable 1 milliGRAM(s) IntraMuscular once PRN Glucose LESS THAN 70 milligrams/deciliter  nystatin Powder 1 Application(s) Topical three times a day PRN under breasts      EXAM:  Vital Signs Last 24 Hrs  T(C): 36.4 (09 Jul 2019 06:24), Max: 36.9 (08 Jul 2019 21:36)  T(F): 97.5 (09 Jul 2019 06:24), Max: 98.4 (08 Jul 2019 21:36)  HR: 102 (09 Jul 2019 06:24) (102 - 105)  BP: 107/67 (09 Jul 2019 06:24) (92/55 - 153/77)  BP(mean): --  RR: 18 (09 Jul 2019 06:24) (18 - 19)  SpO2: 95% (09 Jul 2019 06:24) (95% - 100%)    GENERAL: The patient is awake   in no apparent distress.     LUNGS: poor effort;  respirations unlabored    HEART: Regular rate and rhythm without murmur.                            7.1    10.1  )-----------( 377      ( 08 Jul 2019 10:24 )             20.8       07-08    130<L>  |  98  |  76<H>  ----------------------------<  124<H>  4.8   |  21<L>  |  1.84<H>    Ca    9.2      08 Jul 2019 10:24          PROBLEM LIST:  69y Female with HEALTH ISSUES - PROBLEM Dx:  Fever: Fever  Type 2 diabetes mellitus without complication, without long-term current use of insulin:   Make sure you get your HgA1c checked every three months.  If you take oral diabetes medications, check your blood glucose two times a day.  If you take insulin, check your blood glucose before meals and at bedtime.  It&#x27;s important not to skip any meals.  Keep a log of your blood glucose results and always take it with you to your doctor appointments.  Keep a list of your current medications including injectables and over the counter medications and bring this medication list with you to all your doctor appointments.  If you have not seen your ophthalmologist this year call for appointment.  Check your feet daily for redness, sores, or openings. Do not self treat. If no improvement in two days call your primary care physician for an appointment.  Low blood sugar (hypoglycemia) is a blood sugar below 70mg/dl. Check your blood sugar if you feel signs/symptoms of hypoglycemia. If your blood sugar is below 70 take 15 grams of carbohydrates (ex 4 oz of apple juice, 3-4 glucose tablets, or 4-6 oz of regular soda) wait 15 minutes and repeat blood sugar to make sure it comes up above 70.  If your blood sugar is above 70 and you are due for a meal, have a meal.  If you are not due for a meal have a snack.  This snack helps keeps your blood sugar at a safe range.    Pressure injury of skin of sacral region, unspecified injury stage: Continue with wound care as instructed.   Maintain adequate nutrition, frequent repositioning , offloading with Zfloat boots.  Follow-up with your primary care physician and Wound Care Specialist within 1 week. Call for appointment.    Chronic deep vein thrombosis (DVT) of right lower extremity, unspecified vein: Take your &quot;blood thinners&quot; as prescribed.  Walking is encouraged, increase activity as tolerated.  If you develop new leg pain, swelling, and/or redness contact your healthcare provider.  If you develop new chest pain with difficulty breathing, a rapid heart rate and/or a feeling of passing out call emergency medical services 911.    Prophylactic measure: Prophylactic measure  Dementia without behavioral disturbance, unspecified dementia type: Continue with medications as prescribed by your doctor.  Maintain safe environment.  Maintain adequate nutrition, hydration.  Follow-up with your primary care physician within 1 week. Call for appointment.    Uncomplicated asthma, unspecified asthma severity, unspecified whether persistent: Stable.  Follow-up with your primary care physician within 1 week. Call for appointment.    Sepsis, due to unspecified organism: Take all antibiotics as ordered.  Call you Health care provider upon arrival home to make a one week follow up appointment.  If you develop fever, chills, malaise, or change in mental status call your Health Care Provider or go to the Emergency Department.  Nutrition is important, eat small frequent meals to help ensure you get adequate calories.  Do not stay in bed all day!  Increase your activity daily as tolerated.    PEG tube malfunction: PEG tube malfunction            RECS:  chest PT as tolerated  neb around the clock  aspiration precautions    discussed with  at bedside    Please call with any questions.    Leigh Ann Resendez DO  [facility] Pulmonary/Sleep Medicine  [phone]

## 2019-07-10 LAB
ANION GAP SERPL CALC-SCNC: 14 MMOL/L — SIGNIFICANT CHANGE UP (ref 5–17)
BUN SERPL-MCNC: 85 MG/DL — HIGH (ref 7–23)
CALCIUM SERPL-MCNC: 9.5 MG/DL — SIGNIFICANT CHANGE UP (ref 8.4–10.5)
CHLORIDE SERPL-SCNC: 96 MMOL/L — SIGNIFICANT CHANGE UP (ref 96–108)
CO2 SERPL-SCNC: 22 MMOL/L — SIGNIFICANT CHANGE UP (ref 22–31)
CREAT SERPL-MCNC: 1.99 MG/DL — HIGH (ref 0.5–1.3)
CULTURE RESULTS: SIGNIFICANT CHANGE UP
GLUCOSE BLDC GLUCOMTR-MCNC: 116 MG/DL — HIGH (ref 70–99)
GLUCOSE BLDC GLUCOMTR-MCNC: 151 MG/DL — HIGH (ref 70–99)
GLUCOSE BLDC GLUCOMTR-MCNC: 190 MG/DL — HIGH (ref 70–99)
GLUCOSE SERPL-MCNC: 106 MG/DL — HIGH (ref 70–99)
HCT VFR BLD CALC: 30.2 % — LOW (ref 34.5–45)
HGB BLD-MCNC: 9.4 G/DL — LOW (ref 11.5–15.5)
MCHC RBC-ENTMCNC: 29.3 PG — SIGNIFICANT CHANGE UP (ref 27–34)
MCHC RBC-ENTMCNC: 31.1 GM/DL — LOW (ref 32–36)
MCV RBC AUTO: 94.1 FL — SIGNIFICANT CHANGE UP (ref 80–100)
PLATELET # BLD AUTO: 431 K/UL — HIGH (ref 150–400)
POTASSIUM SERPL-MCNC: 4.1 MMOL/L — SIGNIFICANT CHANGE UP (ref 3.5–5.3)
POTASSIUM SERPL-SCNC: 4.1 MMOL/L — SIGNIFICANT CHANGE UP (ref 3.5–5.3)
RBC # BLD: 3.21 M/UL — LOW (ref 3.8–5.2)
RBC # FLD: 15.1 % — HIGH (ref 10.3–14.5)
SODIUM SERPL-SCNC: 132 MMOL/L — LOW (ref 135–145)
SPECIMEN SOURCE: SIGNIFICANT CHANGE UP
WBC # BLD: 10.95 K/UL — HIGH (ref 3.8–10.5)
WBC # FLD AUTO: 10.95 K/UL — HIGH (ref 3.8–10.5)

## 2019-07-10 PROCEDURE — 99232 SBSQ HOSP IP/OBS MODERATE 35: CPT

## 2019-07-10 RX ADMIN — Medication 1: at 11:58

## 2019-07-10 RX ADMIN — GABAPENTIN 300 MILLIGRAM(S): 400 CAPSULE ORAL at 21:20

## 2019-07-10 RX ADMIN — Medication 15 MILLILITER(S): at 11:36

## 2019-07-10 RX ADMIN — TIZANIDINE 4 MILLIGRAM(S): 4 TABLET ORAL at 21:20

## 2019-07-10 RX ADMIN — FAMOTIDINE 20 MILLIGRAM(S): 10 INJECTION INTRAVENOUS at 11:36

## 2019-07-10 RX ADMIN — TIZANIDINE 4 MILLIGRAM(S): 4 TABLET ORAL at 08:23

## 2019-07-10 RX ADMIN — Medication 1 DROP(S): at 18:02

## 2019-07-10 RX ADMIN — Medication 1 DROP(S): at 06:38

## 2019-07-10 RX ADMIN — ERGOCALCIFEROL 5000 UNIT(S): 1.25 CAPSULE ORAL at 11:49

## 2019-07-10 RX ADMIN — MIDODRINE HYDROCHLORIDE 5 MILLIGRAM(S): 2.5 TABLET ORAL at 06:38

## 2019-07-10 RX ADMIN — HEPARIN SODIUM 5000 UNIT(S): 5000 INJECTION INTRAVENOUS; SUBCUTANEOUS at 06:38

## 2019-07-10 RX ADMIN — HEPARIN SODIUM 5000 UNIT(S): 5000 INJECTION INTRAVENOUS; SUBCUTANEOUS at 18:02

## 2019-07-10 RX ADMIN — Medication 1 DROP(S): at 11:37

## 2019-07-10 RX ADMIN — Medication 300 MILLIGRAM(S): at 11:36

## 2019-07-10 RX ADMIN — Medication 500 MILLIGRAM(S): at 11:36

## 2019-07-10 RX ADMIN — Medication 3 MILLILITER(S): at 06:37

## 2019-07-10 RX ADMIN — GABAPENTIN 300 MILLIGRAM(S): 400 CAPSULE ORAL at 08:23

## 2019-07-10 RX ADMIN — Medication 1.5 MILLIGRAM(S): at 21:20

## 2019-07-10 RX ADMIN — CHLORHEXIDINE GLUCONATE 1 APPLICATION(S): 213 SOLUTION TOPICAL at 11:38

## 2019-07-10 RX ADMIN — ZINC SULFATE TAB 220 MG (50 MG ZINC EQUIVALENT) 220 MILLIGRAM(S): 220 (50 ZN) TAB at 11:36

## 2019-07-10 RX ADMIN — MIDODRINE HYDROCHLORIDE 5 MILLIGRAM(S): 2.5 TABLET ORAL at 14:24

## 2019-07-10 RX ADMIN — Medication 3 MILLILITER(S): at 11:38

## 2019-07-10 RX ADMIN — QUETIAPINE FUMARATE 25 MILLIGRAM(S): 200 TABLET, FILM COATED ORAL at 21:20

## 2019-07-10 RX ADMIN — Medication 1: at 18:02

## 2019-07-10 NOTE — PROGRESS NOTE ADULT - SUBJECTIVE AND OBJECTIVE BOX
infectious diseases progress note:    Patient is a 69y old  Female who presents with a chief complaint of PEG tube dislodgement, fever, leukocytosis (09 Jul 2019 12:10)        Gastrostomy malfunction        R     Allergies    ASA; dye contrast (Anaphylaxis)  aspirin (Short breath)  divalproex sodium (Other (Unknown))  Flowers and Plants (Short breath)  Haldol (Other (Unknown))  penicillin (Short breath; Rash)  sulfa drugs (Short breath; Rash)  vancomycin (Rash; Urticaria; Hives)  Xanax (Other (Unknown))    Intolerances        ANTIBIOTICS/RELEVANT:  antimicrobials    immunologic:    OTHER:  acetaminophen  Suppository .. 650 milliGRAM(s) Rectal every 6 hours PRN  ALBUTerol/ipratropium for Nebulization. 3 milliLiter(s) Nebulizer four times a day  artificial  tears Solution 1 Drop(s) Both EYES four times a day  ascorbic acid 500 milliGRAM(s) Oral daily  buDESOnide  80 MICROgram(s)/formoterol 4.5 MICROgram(s) Inhaler 2 Puff(s) Inhalation two times a day  carboxymethylcellulose 0.5% (Preservative-Free) Ophthalmic Solution 1 Drop(s) Both EYES three times a day PRN  chlorhexidine 2% Cloths 1 Application(s) Topical daily  clonazePAM  Tablet 1.5 milliGRAM(s) Oral at bedtime  dextrose 40% Gel 15 Gram(s) Oral once PRN  dextrose 5%. 1000 milliLiter(s) IV Continuous <Continuous>  dextrose 50% Injectable 12.5 Gram(s) IV Push once  dextrose 50% Injectable 25 Gram(s) IV Push once  dextrose 50% Injectable 25 Gram(s) IV Push once  ergocalciferol Drops 5000 Unit(s) Oral daily  famotidine    Tablet 20 milliGRAM(s) Oral daily  ferrous    sulfate Liquid 300 milliGRAM(s) Enteral Tube daily  gabapentin 300 milliGRAM(s) Oral two times a day  glucagon  Injectable 1 milliGRAM(s) IntraMuscular once PRN  heparin  Injectable 5000 Unit(s) SubCutaneous every 12 hours  insulin lispro (HumaLOG) corrective regimen sliding scale   SubCutaneous every 6 hours  Medical Marijuana Awake Oil 2 milliLiter(s) 2 milliLiter(s) Enteral Tube <User Schedule>  Medical Marijuana Calm Oil 2 milliLiter(s) 2 milliLiter(s) Enteral Tube <User Schedule>  Medical Marijuana [place] Oil 2 milliLiter(s) 2 milliLiter(s) Enteral Tube <User Schedule>  midodrine 5 milliGRAM(s) Oral every 8 hours  multivitamin/minerals/iron Oral Solution (CENTRUM) 15 milliLiter(s) Oral daily  nystatin Powder 1 Application(s) Topical three times a day PRN  QUEtiapine 25 milliGRAM(s) Oral daily  tiZANidine 4 milliGRAM(s) Oral <User Schedule>  zinc sulfate 220 milliGRAM(s) Oral daily      Objective:  Vital Signs Last 24 Hrs  T(C): 36.7 (10 Jul 2019 05:39), Max: 36.7 (10 Jul 2019 01:06)  T(F): 98 (10 Jul 2019 05:39), Max: 98 (10 Jul 2019 01:06)  HR: 98 (10 Jul 2019 06:53) (92 - 105)  BP: 119/74 (10 Jul 2019 05:39) (92/57 - 134/76)  BP(mean): --  RR: 18 (10 Jul 2019 05:39) (18 - 18)  SpO2: 95% (10 Jul 2019 05:39) (94% - 98%)       Eyes:JACQUELIN, EOMI  Ear/Nose/Throat: no oral lesion, no sinus tenderness on percussion	  Neck:no JVD, no lymphadenopathy, supple  Respiratory: CTA maryjane  Cardiovascular: S1S2 RRR, no murmurs  Gastrointestinal:soft, (+) BS, no HSM  Extremities:n contracted         LABS:                        9.7    12.2  )-----------( 471      ( 09 Jul 2019 10:27 )             29.1     07-10    132<L>  |  96  |  85<H>  ----------------------------<  106<H>  4.1   |  22  |  1.99<H>    Ca    9.5      10 Jul 2019 06:50              MICROBIOLOGY:    RECENT CULTURES:  07-05 @ 23:36 .Blood                No growth to date.    07-05 @ 13:06 .Blood                No growth to date.          RESPIRATORY CULTURES:              RADIOLOGY & ADDITIONAL STUDIES:        Pager [phone]  After 5 pm/weekends or if no response :[phone]

## 2019-07-10 NOTE — PROGRESS NOTE ADULT - SUBJECTIVE AND OBJECTIVE BOX
[facility] [phone]        Patient seen and examined in bed.  She was about the same         MEDICATIONS  (STANDING):  ALBUTerol/ipratropium for Nebulization. 3 milliLiter(s) Nebulizer four times a day  artificial  tears Solution 1 Drop(s) Both EYES four times a day  ascorbic acid 500 milliGRAM(s) Oral daily  buDESOnide  80 MICROgram(s)/formoterol 4.5 MICROgram(s) Inhaler 2 Puff(s) Inhalation two times a day  chlorhexidine 2% Cloths 1 Application(s) Topical daily  clonazePAM  Tablet 1.5 milliGRAM(s) Oral at bedtime  dextrose 5%. 1000 milliLiter(s) (50 mL/Hr) IV Continuous <Continuous>  dextrose 50% Injectable 12.5 Gram(s) IV Push once  dextrose 50% Injectable 25 Gram(s) IV Push once  dextrose 50% Injectable 25 Gram(s) IV Push once  ergocalciferol Drops 5000 Unit(s) Oral daily  famotidine    Tablet 20 milliGRAM(s) Oral daily  ferrous    sulfate Liquid 300 milliGRAM(s) Enteral Tube daily  gabapentin 300 milliGRAM(s) Oral two times a day  heparin  Injectable 5000 Unit(s) SubCutaneous every 12 hours  insulin lispro (HumaLOG) corrective regimen sliding scale   SubCutaneous every 6 hours  Medical Marijuana Awake Oil 2 milliLiter(s) 2 milliLiter(s) Enteral Tube <User Schedule>  Medical Marijuana Calm Oil 2 milliLiter(s) 2 milliLiter(s) Enteral Tube <User Schedule>  Medical Marijuana [place] Oil 2 milliLiter(s) 2 milliLiter(s) Enteral Tube <User Schedule>  midodrine 5 milliGRAM(s) Oral every 8 hours  multivitamin/minerals/iron Oral Solution (CENTRUM) 15 milliLiter(s) Oral daily  QUEtiapine 25 milliGRAM(s) Oral daily  tiZANidine 4 milliGRAM(s) Oral <User Schedule>  zinc sulfate 220 milliGRAM(s) Oral daily      VITAL:  T(C): , Max: 36.7 (07-10-19 @ 01:06)  T(F): , Max: 98 (07-10-19 @ 01:06)  HR: 98 (07-10-19 @ 06:53)  BP: 119/74 (07-10-19 @ 05:39)  BP(mean): --  RR: 18 (07-10-19 @ 05:39)  SpO2: 95% (07-10-19 @ 05:39)  Wt(kg): --    I and O's:    07-09 @ 07:01  -  07-10 @ 07:00  --------------------------------------------------------  IN: 1220 mL / OUT: 1200 mL / NET: 20 mL        Weight (kg): 35.9 (07-10 @ 05:11)    PHYSICAL EXAM:    Constitutional: NAD  Neck:  No JVD  Respiratory: CTAB/L  Cardiovascular: S1 and S2  Gastrointestinal: BS+, soft, NT/ND  Extremities: +  peripheral edema  Neurological:   no focal deficits but contracted   Psychiatric: unable   : No Blackman  Skin: No rashes  Access: Not applicable    LABS:                        9.7    12.2  )-----------( 471      ( 09 Jul 2019 10:27 )             29.1     07-10    132<L>  |  96  |  85<H>  ----------------------------<  106<H>  4.1   |  22  |  1.99<H>    Ca    9.5      10 Jul 2019 06:50            Urine Studies:          RADIOLOGY & ADDITIONAL STUDIES:

## 2019-07-10 NOTE — PROGRESS NOTE ADULT - SUBJECTIVE AND OBJECTIVE BOX
HPI:  Patient seen and examined at bedside with her daughter present.  No events overnight.  Renal function appears to have stabilized.    Review Of Systems:   Unable to assess due to advanced dementia    Medications:  acetaminophen  Suppository .. 650 milliGRAM(s) Rectal every 6 hours PRN  artificial  tears Solution 1 Drop(s) Both EYES four times a day  ascorbic acid 500 milliGRAM(s) Oral daily  buDESOnide  80 MICROgram(s)/formoterol 4.5 MICROgram(s) Inhaler 2 Puff(s) Inhalation two times a day  carboxymethylcellulose 0.5% (Preservative-Free) Ophthalmic Solution 1 Drop(s) Both EYES three times a day PRN  chlorhexidine 2% Cloths 1 Application(s) Topical daily  clonazePAM  Tablet 1.5 milliGRAM(s) Oral at bedtime  dextrose 40% Gel 15 Gram(s) Oral once PRN  dextrose 5%. 1000 milliLiter(s) IV Continuous <Continuous>  dextrose 50% Injectable 12.5 Gram(s) IV Push once  dextrose 50% Injectable 25 Gram(s) IV Push once  dextrose 50% Injectable 25 Gram(s) IV Push once  ergocalciferol Drops 5000 Unit(s) Oral daily  famotidine    Tablet 20 milliGRAM(s) Oral daily  ferrous    sulfate Liquid 300 milliGRAM(s) Enteral Tube daily  gabapentin 300 milliGRAM(s) Oral two times a day  glucagon  Injectable 1 milliGRAM(s) IntraMuscular once PRN  heparin  Injectable 5000 Unit(s) SubCutaneous every 12 hours  insulin lispro (HumaLOG) corrective regimen sliding scale   SubCutaneous every 6 hours  Medical Marijuana Awake Oil 2 milliLiter(s) 2 milliLiter(s) Enteral Tube <User Schedule>  Medical Marijuana Calm Oil 2 milliLiter(s) 2 milliLiter(s) Enteral Tube <User Schedule>  Medical Marijuana [place] Oil 2 milliLiter(s) 2 milliLiter(s) Enteral Tube <User Schedule>  midodrine 5 milliGRAM(s) Oral every 8 hours  multivitamin/minerals/iron Oral Solution (CENTRUM) 15 milliLiter(s) Oral daily  nystatin Powder 1 Application(s) Topical three times a day PRN  QUEtiapine 25 milliGRAM(s) Oral daily  tiZANidine 4 milliGRAM(s) Oral <User Schedule>  zinc sulfate 220 milliGRAM(s) Oral daily    PAST MEDICAL & SURGICAL HISTORY:  Type 2 diabetes mellitus  Dementia  DVT, lower extremity  CVA (cerebral vascular accident)  Fatty pancreas  PNA (pneumonia)  Pulmonary HTN  IGT (impaired glucose tolerance)  Ulcerative colitis  Acid reflux  Anxiety  Depression  Mouth sores  HLD (hyperlipidemia)  Asthma  S/P percutaneous endoscopic gastrostomy (PEG) tube placement: for dysphagia  Humeral head fracture  H/O: hysterectomy  H/O cataract extraction, left  History of knee replacement    Vitals:  T(C): 36.3 (07-10-19 @ 14:49), Max: 36.7 (07-10-19 @ 01:06)  HR: 105 (07-10-19 @ 14:49) (98 - 105)  BP: 134/78 (07-10-19 @ 14:49) (92/57 - 134/78)  BP(mean): --  RR: 18 (07-10-19 @ 14:49) (18 - 18)  SpO2: 98% (07-10-19 @ 14:49) (94% - 98%)  Wt(kg): --  Daily     Daily   I&O's Summary    09 Jul 2019 07:01  -  10 Jul 2019 07:00  --------------------------------------------------------  IN: 1220 mL / OUT: 1200 mL / NET: 20 mL    10 Jul 2019 07:01  -  10 Jul 2019 21:57  --------------------------------------------------------  IN: 0 mL / OUT: 275 mL / NET: -275 mL        Physical Exam:  Appearance: functional quadriplegia   Eyes: PERRLA, EOMI, pink conjunctiva, no scleral icterus   HENT: Normal oral mucosa  Cardiovascular: RRR, S1, S2, no murmur, rub, or gallop; + edema in hands and feet; +JVD  Procedural Access Site: Clean, dry, intact, without hematoma  Respiratory: bilateral air entry; poor inspiratory effort  Gastrointestinal: Soft, non-tender, non-distended, BS+, +PEG  Musculoskeletal: +contracted  Neurologic: functional quadriplegia   Lymphatic: No lymphadenopathy  Psychiatry: advanced dementia  Skin: No rashes, ecchymoses, or cyanosis on extremities                          9.4    10.95 )-----------( 431      ( 10 Jul 2019 10:19 )             30.2     07-10    132<L>  |  96  |  85<H>  ----------------------------<  106<H>  4.1   |  22  |  1.99<H>    Ca    9.5      10 Jul 2019 06:50

## 2019-07-10 NOTE — PROGRESS NOTE ADULT - ASSESSMENT
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 Since admission, went to IR to have PEG replaced (6/25/19)  +fever and RRT over weekend for suspected aspiration event, and with Renal Insufficiency    -Discussed option of GJ tube but explained high risk of clogging and cannot do bolus feeds and pt would still be at risk for aspiration from secretions as she is presently with G tube (spouse and family do not want to explore GJ conversion option)    RECS:  -keep G tube bumper at ~2-3 cm richard on G tube. Discussed with daughter at bedside who admits they are used to placing multiple pieces of gauze under bumper in effort to protect the skin.  Daughter educated and expresses understanding  -Nepro via G tube (bolus regimen, keep HOB at 30 degrees for at least 1 hour after feeds)  -wound care  -further care per primary team  -Abx as per ID  -monitor BMs  -Continue Pepcid  -Pulmonary following    Lengthy discussion with daughter at bedside, all questions answered  Discussed with Medicine team    Adam Miller PA-C    [facility]  [phone]  After hours and weekend coverage [phone]

## 2019-07-10 NOTE — PROGRESS NOTE ADULT - ASSESSMENT
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 since admission, went to IR to have PEG replaced.  TRANG and hyperkalemia.    1. TRANG likely multifactorial and the vancomycin was likely a contributory factor  .    2. Hyponatremia from TRANG that seems to be improving  3. Nephrotic   4. SP Hyperkalemia and now normal on the new feeds   5. Anemia.     RECOMMEND:  -Off IVF now;  I suspect that the creatinine has plateaued today and will start a slow decrease in am   - Appropriate response to the Blood xfusion.  Lasix given this admission without rash.    - On  Nephro till the creatinine improves    - Hold the vancomycin.  Trend its level please   - Renal dose all medications for a GFR about 25cc/min

## 2019-07-10 NOTE — PROGRESS NOTE ADULT - ASSESSMENT
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  Fever and leukocytosis now. Abnormal CT chest.    , patient found with hypoxia and AMS suspect from aspiration event.            aspiration/pna     all cultures negative and temps better   off ab  hopefully creat will come down off vanco   unresponsive today  reculture for sings of infection  discussed with family    -

## 2019-07-10 NOTE — PROGRESS NOTE ADULT - SUBJECTIVE AND OBJECTIVE BOX
PULMONARY PROGRESS NOTE    MYRIAM HEATH  MRN-[id number]    Patient is a 69y old  Female who presents with a chief complaint of PEG tube dislodgement, fever, leukocytosis (10 Jul 2019 11:15)      HPI:  -  -    ROS:   -    ACTIVE MEDICATION LIST:  MEDICATIONS  (STANDING):  artificial  tears Solution 1 Drop(s) Both EYES four times a day  ascorbic acid 500 milliGRAM(s) Oral daily  buDESOnide  80 MICROgram(s)/formoterol 4.5 MICROgram(s) Inhaler 2 Puff(s) Inhalation two times a day  chlorhexidine 2% Cloths 1 Application(s) Topical daily  clonazePAM  Tablet 1.5 milliGRAM(s) Oral at bedtime  dextrose 5%. 1000 milliLiter(s) (50 mL/Hr) IV Continuous <Continuous>  dextrose 50% Injectable 12.5 Gram(s) IV Push once  dextrose 50% Injectable 25 Gram(s) IV Push once  dextrose 50% Injectable 25 Gram(s) IV Push once  ergocalciferol Drops 5000 Unit(s) Oral daily  famotidine    Tablet 20 milliGRAM(s) Oral daily  ferrous    sulfate Liquid 300 milliGRAM(s) Enteral Tube daily  gabapentin 300 milliGRAM(s) Oral two times a day  heparin  Injectable 5000 Unit(s) SubCutaneous every 12 hours  insulin lispro (HumaLOG) corrective regimen sliding scale   SubCutaneous every 6 hours  Medical Marijuana Awake Oil 2 milliLiter(s) 2 milliLiter(s) Enteral Tube <User Schedule>  Medical Marijuana Calm Oil 2 milliLiter(s) 2 milliLiter(s) Enteral Tube <User Schedule>  Medical Marijuana [place] Oil 2 milliLiter(s) 2 milliLiter(s) Enteral Tube <User Schedule>  midodrine 5 milliGRAM(s) Oral every 8 hours  multivitamin/minerals/iron Oral Solution (CENTRUM) 15 milliLiter(s) Oral daily  QUEtiapine 25 milliGRAM(s) Oral daily  tiZANidine 4 milliGRAM(s) Oral <User Schedule>  zinc sulfate 220 milliGRAM(s) Oral daily    MEDICATIONS  (PRN):  acetaminophen  Suppository .. 650 milliGRAM(s) Rectal every 6 hours PRN Temp greater or equal to 38C (100.4F), Mild Pain (1 - 3)  carboxymethylcellulose 0.5% (Preservative-Free) Ophthalmic Solution 1 Drop(s) Both EYES three times a day PRN dry eyes  dextrose 40% Gel 15 Gram(s) Oral once PRN Blood Glucose LESS THAN 70 milliGRAM(s)/deciliter  glucagon  Injectable 1 milliGRAM(s) IntraMuscular once PRN Glucose LESS THAN 70 milligrams/deciliter  nystatin Powder 1 Application(s) Topical three times a day PRN under breasts      EXAM:  Vital Signs Last 24 Hrs  T(C): 36.3 (10 Jul 2019 14:49), Max: 36.7 (10 Jul 2019 01:06)  T(F): 97.3 (10 Jul 2019 14:49), Max: 98 (10 Jul 2019 01:06)  HR: 105 (10 Jul 2019 14:49) (98 - 105)  BP: 134/78 (10 Jul 2019 14:49) (92/57 - 134/78)  BP(mean): --  RR: 18 (10 Jul 2019 14:49) (18 - 18)  SpO2: 98% (10 Jul 2019 14:49) (94% - 98%)    GENERAL: The patient is awake and alert in no apparent distress.     LUNGS: Clear to auscultation without wheezing, rales or rhonchi; respirations unlabored    HEART: Regular rate and rhythm without murmur.                            9.4    10.95 )-----------( 431      ( 10 Jul 2019 10:19 )             30.2       07-10    132<L>  |  96  |  85<H>  ----------------------------<  106<H>  4.1   |  22  |  1.99<H>    Ca    9.5      10 Jul 2019 06:50          PROBLEM LIST:  69y Female with HEALTH ISSUES - PROBLEM Dx:  Fever: Fever  Type 2 diabetes mellitus without complication, without long-term current use of insulin:   Make sure you get your HgA1c checked every three months.  If you take oral diabetes medications, check your blood glucose two times a day.  If you take insulin, check your blood glucose before meals and at bedtime.  It&#x27;s important not to skip any meals.  Keep a log of your blood glucose results and always take it with you to your doctor appointments.  Keep a list of your current medications including injectables and over the counter medications and bring this medication list with you to all your doctor appointments.  If you have not seen your ophthalmologist this year call for appointment.  Check your feet daily for redness, sores, or openings. Do not self treat. If no improvement in two days call your primary care physician for an appointment.  Low blood sugar (hypoglycemia) is a blood sugar below 70mg/dl. Check your blood sugar if you feel signs/symptoms of hypoglycemia. If your blood sugar is below 70 take 15 grams of carbohydrates (ex 4 oz of apple juice, 3-4 glucose tablets, or 4-6 oz of regular soda) wait 15 minutes and repeat blood sugar to make sure it comes up above 70.  If your blood sugar is above 70 and you are due for a meal, have a meal.  If you are not due for a meal have a snack.  This snack helps keeps your blood sugar at a safe range.    Pressure injury of skin of sacral region, unspecified injury stage: Continue with wound care as instructed.   Maintain adequate nutrition, frequent repositioning , offloading with Zfloat boots.  Follow-up with your primary care physician and Wound Care Specialist within 1 week. Call for appointment.    Chronic deep vein thrombosis (DVT) of right lower extremity, unspecified vein: Take your &quot;blood thinners&quot; as prescribed.  Walking is encouraged, increase activity as tolerated.  If you develop new leg pain, swelling, and/or redness contact your healthcare provider.  If you develop new chest pain with difficulty breathing, a rapid heart rate and/or a feeling of passing out call emergency medical services 911.    Prophylactic measure: Prophylactic measure  Dementia without behavioral disturbance, unspecified dementia type: Continue with medications as prescribed by your doctor.  Maintain safe environment.  Maintain adequate nutrition, hydration.  Follow-up with your primary care physician within 1 week. Call for appointment.    Uncomplicated asthma, unspecified asthma severity, unspecified whether persistent: Stable.  Follow-up with your primary care physician within 1 week. Call for appointment.    Sepsis, due to unspecified organism: Take all antibiotics as ordered.  Call you Health care provider upon arrival home to make a one week follow up appointment.  If you develop fever, chills, malaise, or change in mental status call your Health Care Provider or go to the Emergency Department.  Nutrition is important, eat small frequent meals to help ensure you get adequate calories.  Do not stay in bed all day!  Increase your activity daily as tolerated.    PEG tube malfunction: PEG tube malfunction            RECS:        Please call with any questions.    Leigh Ann Resendez DO  [facility] Pulmonary/Sleep Medicine  [phone] PULMONARY PROGRESS NOTE    MYRIAM HEATH  MRN-[id number]    Patient is a 69y old  Female who presents with a chief complaint of PEG tube dislodgement, fever, leukocytosis (10 Jul 2019 11:15)      HPI:  afebrile. off 02.     ROS:   -    ACTIVE MEDICATION LIST:  MEDICATIONS  (STANDING):  artificial  tears Solution 1 Drop(s) Both EYES four times a day  ascorbic acid 500 milliGRAM(s) Oral daily  buDESOnide  80 MICROgram(s)/formoterol 4.5 MICROgram(s) Inhaler 2 Puff(s) Inhalation two times a day  chlorhexidine 2% Cloths 1 Application(s) Topical daily  clonazePAM  Tablet 1.5 milliGRAM(s) Oral at bedtime  dextrose 5%. 1000 milliLiter(s) (50 mL/Hr) IV Continuous <Continuous>  dextrose 50% Injectable 12.5 Gram(s) IV Push once  dextrose 50% Injectable 25 Gram(s) IV Push once  dextrose 50% Injectable 25 Gram(s) IV Push once  ergocalciferol Drops 5000 Unit(s) Oral daily  famotidine    Tablet 20 milliGRAM(s) Oral daily  ferrous    sulfate Liquid 300 milliGRAM(s) Enteral Tube daily  gabapentin 300 milliGRAM(s) Oral two times a day  heparin  Injectable 5000 Unit(s) SubCutaneous every 12 hours  insulin lispro (HumaLOG) corrective regimen sliding scale   SubCutaneous every 6 hours  Medical Marijuana Awake Oil 2 milliLiter(s) 2 milliLiter(s) Enteral Tube <User Schedule>  Medical Marijuana Calm Oil 2 milliLiter(s) 2 milliLiter(s) Enteral Tube <User Schedule>  Medical Marijuana [place] Oil 2 milliLiter(s) 2 milliLiter(s) Enteral Tube <User Schedule>  midodrine 5 milliGRAM(s) Oral every 8 hours  multivitamin/minerals/iron Oral Solution (CENTRUM) 15 milliLiter(s) Oral daily  QUEtiapine 25 milliGRAM(s) Oral daily  tiZANidine 4 milliGRAM(s) Oral <User Schedule>  zinc sulfate 220 milliGRAM(s) Oral daily    MEDICATIONS  (PRN):  acetaminophen  Suppository .. 650 milliGRAM(s) Rectal every 6 hours PRN Temp greater or equal to 38C (100.4F), Mild Pain (1 - 3)  carboxymethylcellulose 0.5% (Preservative-Free) Ophthalmic Solution 1 Drop(s) Both EYES three times a day PRN dry eyes  dextrose 40% Gel 15 Gram(s) Oral once PRN Blood Glucose LESS THAN 70 milliGRAM(s)/deciliter  glucagon  Injectable 1 milliGRAM(s) IntraMuscular once PRN Glucose LESS THAN 70 milligrams/deciliter  nystatin Powder 1 Application(s) Topical three times a day PRN under breasts      EXAM:  Vital Signs Last 24 Hrs  T(C): 36.3 (10 Jul 2019 14:49), Max: 36.7 (10 Jul 2019 01:06)  T(F): 97.3 (10 Jul 2019 14:49), Max: 98 (10 Jul 2019 01:06)  HR: 105 (10 Jul 2019 14:49) (98 - 105)  BP: 134/78 (10 Jul 2019 14:49) (92/57 - 134/78)  BP(mean): --  RR: 18 (10 Jul 2019 14:49) (18 - 18)  SpO2: 98% (10 Jul 2019 14:49) (94% - 98%)    GENERAL: The patient is awake and alert in no apparent distress.     LUNGS: poor air entryi; respirations unlabored    HEART: Regular rate and rhythm without murmur.                            9.4    10.95 )-----------( 431      ( 10 Jul 2019 10:19 )             30.2       07-10    132<L>  |  96  |  85<H>  ----------------------------<  106<H>  4.1   |  22  |  1.99<H>    Ca    9.5      10 Jul 2019 06:50          PROBLEM LIST:  69y Female with HEALTH ISSUES - PROBLEM Dx:  Fever: Fever  Type 2 diabetes mellitus without complication, without long-term current use of insulin:   Make sure you get your HgA1c checked every three months.  If you take oral diabetes medications, check your blood glucose two times a day.  If you take insulin, check your blood glucose before meals and at bedtime.  It&#x27;s important not to skip any meals.  Keep a log of your blood glucose results and always take it with you to your doctor appointments.  Keep a list of your current medications including injectables and over the counter medications and bring this medication list with you to all your doctor appointments.  If you have not seen your ophthalmologist this year call for appointment.  Check your feet daily for redness, sores, or openings. Do not self treat. If no improvement in two days call your primary care physician for an appointment.  Low blood sugar (hypoglycemia) is a blood sugar below 70mg/dl. Check your blood sugar if you feel signs/symptoms of hypoglycemia. If your blood sugar is below 70 take 15 grams of carbohydrates (ex 4 oz of apple juice, 3-4 glucose tablets, or 4-6 oz of regular soda) wait 15 minutes and repeat blood sugar to make sure it comes up above 70.  If your blood sugar is above 70 and you are due for a meal, have a meal.  If you are not due for a meal have a snack.  This snack helps keeps your blood sugar at a safe range.    Pressure injury of skin of sacral region, unspecified injury stage: Continue with wound care as instructed.   Maintain adequate nutrition, frequent repositioning , offloading with Zfloat boots.  Follow-up with your primary care physician and Wound Care Specialist within 1 week. Call for appointment.    Chronic deep vein thrombosis (DVT) of right lower extremity, unspecified vein: Take your &quot;blood thinners&quot; as prescribed.  Walking is encouraged, increase activity as tolerated.  If you develop new leg pain, swelling, and/or redness contact your healthcare provider.  If you develop new chest pain with difficulty breathing, a rapid heart rate and/or a feeling of passing out call emergency medical services 911.    Prophylactic measure: Prophylactic measure  Dementia without behavioral disturbance, unspecified dementia type: Continue with medications as prescribed by your doctor.  Maintain safe environment.  Maintain adequate nutrition, hydration.  Follow-up with your primary care physician within 1 week. Call for appointment.    Uncomplicated asthma, unspecified asthma severity, unspecified whether persistent: Stable.  Follow-up with your primary care physician within 1 week. Call for appointment.    Sepsis, due to unspecified organism: Take all antibiotics as ordered.  Call you Health care provider upon arrival home to make a one week follow up appointment.  If you develop fever, chills, malaise, or change in mental status call your Health Care Provider or go to the Emergency Department.  Nutrition is important, eat small frequent meals to help ensure you get adequate calories.  Do not stay in bed all day!  Increase your activity daily as tolerated.    PEG tube malfunction: PEG tube malfunction            RECS:  continue aspiration precautions  neb PRN  chest PT  rest of care as per primary team      Please call with any questions.    Leigh Ann Resendez DO  [facility] Pulmonary/Sleep Medicine  [phone]

## 2019-07-10 NOTE — PROGRESS NOTE ADULT - SUBJECTIVE AND OBJECTIVE BOX
---___---___---___---___---___---___ ---___---___---___---___---___---___---___---___---                  M E D I C A L   A T T E N D I N G   P R O G R E S S   N O T E  ---___---___---___---___---___---___ ---___---___---___---___---___---___---___---___---        ================================================    ++CHIEF COMPLAINT:   Patient is a 69y old  Female who presents with a chief complaint of PEG tube dislodgement, fever, leukocytosis (10 Jul 2019 15:55)      renal function plateauing will follow number on daily basis     ---___---___---___---___---___---  PAST MEDICAL / Surgical  HISTORY:  PAST MEDICAL & SURGICAL HISTORY:  Type 2 diabetes mellitus  Dementia  DVT, lower extremity  CVA (cerebral vascular accident)  Fatty pancreas  PNA (pneumonia)  Pulmonary HTN  IGT (impaired glucose tolerance)  Ulcerative colitis  Acid reflux  Anxiety  Depression  Mouth sores  HLD (hyperlipidemia)  Asthma  S/P percutaneous endoscopic gastrostomy (PEG) tube placement: for dysphagia  Humeral head fracture  H/O: hysterectomy  H/O cataract extraction, left  History of knee replacement      ---___---___---___---___---___---  FAMILY HISTORY:   FAMILY HISTORY:  Family history of dementia (Grandparent)  Family history of colon cancer (Grandparent)        ---___---___---___---___---___---  ALLERGIES:   Allergies    ASA; dye contrast (Anaphylaxis)  aspirin (Short breath)  divalproex sodium (Other (Unknown))  Flowers and Plants (Short breath)  Haldol (Other (Unknown))  penicillin (Short breath; Rash)  sulfa drugs (Short breath; Rash)  vancomycin (Rash; Urticaria; Hives)  Xanax (Other (Unknown))    Intolerances        ---___---___---___---___---___---  MEDICATIONS:  MEDICATIONS  (STANDING):  artificial  tears Solution 1 Drop(s) Both EYES four times a day  ascorbic acid 500 milliGRAM(s) Oral daily  buDESOnide  80 MICROgram(s)/formoterol 4.5 MICROgram(s) Inhaler 2 Puff(s) Inhalation two times a day  chlorhexidine 2% Cloths 1 Application(s) Topical daily  clonazePAM  Tablet 1.5 milliGRAM(s) Oral at bedtime  dextrose 5%. 1000 milliLiter(s) (50 mL/Hr) IV Continuous <Continuous>  dextrose 50% Injectable 12.5 Gram(s) IV Push once  dextrose 50% Injectable 25 Gram(s) IV Push once  dextrose 50% Injectable 25 Gram(s) IV Push once  ergocalciferol Drops 5000 Unit(s) Oral daily  famotidine    Tablet 20 milliGRAM(s) Oral daily  ferrous    sulfate Liquid 300 milliGRAM(s) Enteral Tube daily  gabapentin 300 milliGRAM(s) Oral two times a day  heparin  Injectable 5000 Unit(s) SubCutaneous every 12 hours  insulin lispro (HumaLOG) corrective regimen sliding scale   SubCutaneous every 6 hours  Medical Marijuana [place]ake Oil 2 milliLiter(s) 2 milliLiter(s) Enteral Tube <User Schedule>  Medical Marijuana Calm Oil 2 milliLiter(s) 2 milliLiter(s) Enteral Tube <User Schedule>  Medical Marijuana [place] Oil 2 milliLiter(s) 2 milliLiter(s) Enteral Tube <User Schedule>  midodrine 5 milliGRAM(s) Oral every 8 hours  multivitamin/minerals/iron Oral Solution (CENTRUM) 15 milliLiter(s) Oral daily  QUEtiapine 25 milliGRAM(s) Oral daily  tiZANidine 4 milliGRAM(s) Oral <User Schedule>  zinc sulfate 220 milliGRAM(s) Oral daily    MEDICATIONS  (PRN):  acetaminophen  Suppository .. 650 milliGRAM(s) Rectal every 6 hours PRN Temp greater or equal to 38C (100.4F), Mild Pain (1 - 3)  carboxymethylcellulose 0.5% (Preservative-Free) Ophthalmic Solution 1 Drop(s) Both EYES three times a day PRN dry eyes  dextrose 40% Gel 15 Gram(s) Oral once PRN Blood Glucose LESS THAN 70 milliGRAM(s)/deciliter  glucagon  Injectable 1 milliGRAM(s) IntraMuscular once PRN Glucose LESS THAN 70 milligrams/deciliter  nystatin Powder 1 Application(s) Topical three times a day PRN under breasts      ---___---___---___---___---___---  REVIEW OF SYSTEM:    unable to obtain     ---___---___---___---___---___---  VITAL SIGNS:  69y , CAPILLARY BLOOD GLUCOSE      POCT Blood Glucose.: 151 mg/dL (10 Jul 2019 17:50)    T(C): 36.3 (07-10-19 @ 14:49), Max: 36.7 (07-10-19 @ 01:06)  HR: 105 (07-10-19 @ 14:49) (98 - 105)  BP: 134/78 (07-10-19 @ 14:49) (92/57 - 134/78)  RR: 18 (07-10-19 @ 14:49) (18 - 18)  SpO2: 98% (07-10-19 @ 14:49) (94% - 98%)  ---___---___---___---___---___---  PHYSICAL EXAM:    GEN: A&O X 0 , NAD , comfortable  HEENT: NCAT, PERRL, MMM, hearing intact  Neck: supple , no JVD  CVS: S1S2 , regular , No M/R/G appreciated  PULM: CTA B/L,  no W/R/R appreciated  ABD.: soft. non tender, non distended,  bowel sounds present  Extrem: intact pulses , no edema   Derm: sacral decubitus  ulcer noted         ---___---___---___---___---___---            LAB AND IMAGIN.4    10.95 )-----------( 431      ( 10 Jul 2019 10:19 )             30.2               07-10    132<L>  |  96  |  85<H>  ----------------------------<  106<H>  4.1   |  22  |  1.99<H>    Ca    9.5      10 Jul 2019 06:50                                  [All pertinent / recent Imaging reviewed]         ---___---___---___---___---___---___ ---___---___---___---___---                         A S S E S S M E N T   A N D   P L A N :      HEALTH ISSUES - PROBLEM Dx:    libia continue to monitor cr level   hyperkalemia improved   agitation continue klonopin and seroquel   asthma on budenoside  stage 4 sacral decubitus  contiue wound care   functional quadraplegia  continue supportive care  -GI/DVT Prophylaxis.  on heparin   monitor cbc for anemia   medical marijuana for chronic pain        ___________________________  Thank you,  Deniz Bolden  [phone]

## 2019-07-10 NOTE — PROGRESS NOTE ADULT - SUBJECTIVE AND OBJECTIVE BOX
Patient is a 69y old  Female who presented with a chief complaint of PEG tube dislodgement, fever, leukocytosis (10 Jul 2019 10:03)      INTERVAL HPI/OVERNIGHT EVENTS:  tolerating PEG feeds  no reported diarrhea  no reported increased residuals  no reported leakage at G tube site    MEDICATIONS  (STANDING):  ALBUTerol/ipratropium for Nebulization. 3 milliLiter(s) Nebulizer four times a day  artificial  tears Solution 1 Drop(s) Both EYES four times a day  ascorbic acid 500 milliGRAM(s) Oral daily  buDESOnide  80 MICROgram(s)/formoterol 4.5 MICROgram(s) Inhaler 2 Puff(s) Inhalation two times a day  chlorhexidine 2% Cloths 1 Application(s) Topical daily  clonazePAM  Tablet 1.5 milliGRAM(s) Oral at bedtime  dextrose 5%. 1000 milliLiter(s) (50 mL/Hr) IV Continuous <Continuous>  dextrose 50% Injectable 12.5 Gram(s) IV Push once  dextrose 50% Injectable 25 Gram(s) IV Push once  dextrose 50% Injectable 25 Gram(s) IV Push once  ergocalciferol Drops 5000 Unit(s) Oral daily  famotidine    Tablet 20 milliGRAM(s) Oral daily  ferrous    sulfate Liquid 300 milliGRAM(s) Enteral Tube daily  gabapentin 300 milliGRAM(s) Oral two times a day  heparin  Injectable 5000 Unit(s) SubCutaneous every 12 hours  insulin lispro (HumaLOG) corrective regimen sliding scale   SubCutaneous every 6 hours  Medical Marijuana [place] Oil 2 milliLiter(s) 2 milliLiter(s) Enteral Tube <User Schedule>  Medical Marijuana Calm Oil 2 milliLiter(s) 2 milliLiter(s) Enteral Tube <User Schedule>  Medical Marijuana [place] Oil 2 milliLiter(s) 2 milliLiter(s) Enteral Tube <User Schedule>  midodrine 5 milliGRAM(s) Oral every 8 hours  multivitamin/minerals/iron Oral Solution (CENTRUM) 15 milliLiter(s) Oral daily  QUEtiapine 25 milliGRAM(s) Oral daily  tiZANidine 4 milliGRAM(s) Oral <User Schedule>  zinc sulfate 220 milliGRAM(s) Oral daily    MEDICATIONS  (PRN):  acetaminophen  Suppository .. 650 milliGRAM(s) Rectal every 6 hours PRN Temp greater or equal to 38C (100.4F), Mild Pain (1 - 3)  carboxymethylcellulose 0.5% (Preservative-Free) Ophthalmic Solution 1 Drop(s) Both EYES three times a day PRN dry eyes  dextrose 40% Gel 15 Gram(s) Oral once PRN Blood Glucose LESS THAN 70 milliGRAM(s)/deciliter  glucagon  Injectable 1 milliGRAM(s) IntraMuscular once PRN Glucose LESS THAN 70 milligrams/deciliter  nystatin Powder 1 Application(s) Topical three times a day PRN under breasts      Allergies  ASA; dye contrast (Anaphylaxis)  aspirin (Short breath)  divalproex sodium (Other (Unknown))  Flowers and Plants (Short breath)  Haldol (Other (Unknown))  penicillin (Short breath; Rash)  sulfa drugs (Short breath; Rash)  vancomycin (Rash; Urticaria; Hives)  Xanax (Other (Unknown))        Review of Systems:  unable to obtain from pt    Vital Signs Last 24 Hrs  T(C): 36.7 (10 Jul 2019 05:39), Max: 36.7 (10 Jul 2019 01:06)  T(F): 98 (10 Jul 2019 05:39), Max: 98 (10 Jul 2019 01:06)  HR: 98 (10 Jul 2019 06:53) (92 - 105)  BP: 119/74 (10 Jul 2019 05:39) (92/57 - 134/76)  BP(mean): --  RR: 18 (10 Jul 2019 05:39) (18 - 18)  SpO2: 95% (10 Jul 2019 05:39) (94% - 98%)    PHYSICAL EXAM:  Constitutional: frail elderly chronically ill appearing +severe contractures, non verbal  opens eyes.  daughter at bedside  Neck: No JVD  Respiratory: occ rhonchi , decreased BS bases  Cardiovascular: S1 and S2 tachy  Gastrointestinal: BS+, soft, +G tube  high in epigastric area  bumper noted to be at 6-8 cm richard and leakage of feeds from G tube stoma with many pieces of drain sponge gauze at stoma. Area cleaned by me , no bleeding. bumper brought down to skin (between 2 and 4 cm richard on tubing). lightly touching skin and spins freely 360 degrees. no further leakage.   Extremities: +edema +dressings in place  Vascular: 2+ peripheral pulses  Neurological: opens eyes  Psychiatric: non verbal   Skin: anicteric  +skin dressing and heel pad cushions in place    LABS:                        9.4    10.95 )-----------( 431      ( 10 Jul 2019 10:19 )             30.2     07-10    132<L>  |  96  |  85<H>  ----------------------------<  106<H>  4.1   |  22  |  1.99<H>    Ca    9.5      10 Jul 2019 06:50    LIVER FUNCTIONS - ( 05 Jul 2019 23:55 )  Alb: 1.9 g/dL / Pro: 4.9 g/dL / ALK PHOS: 72 U/L / ALT: 9 U/L / AST: 14 U/L / GGT: x             RADIOLOGY & ADDITIONAL TESTS:

## 2019-07-10 NOTE — PROGRESS NOTE ADULT - ASSESSMENT
Patient is 69 year-old woman with advanced dementia and functional quadriplegia presents with fevers, leukocytosis in the setting of PEG dislodgement. Patient with acute kidney injury that is likely multifactorial.   No evidence of decompensated heart failure - respirations are unlabored and patient is breathing comfortably on room air.    Antibiotics per ID.  PEG management and feeds per GI/nutrition.    Appreciate nephrology input:  Patient euvolemic on today's exam.   Renal function appears to have plateaued.  Trend daily labs. Avoid nephrotoxic agents.

## 2019-07-11 LAB
ANION GAP SERPL CALC-SCNC: 14 MMOL/L — SIGNIFICANT CHANGE UP (ref 5–17)
BUN SERPL-MCNC: 85 MG/DL — HIGH (ref 7–23)
CALCIUM SERPL-MCNC: 10.2 MG/DL — SIGNIFICANT CHANGE UP (ref 8.4–10.5)
CHLORIDE SERPL-SCNC: 95 MMOL/L — LOW (ref 96–108)
CO2 SERPL-SCNC: 22 MMOL/L — SIGNIFICANT CHANGE UP (ref 22–31)
CREAT SERPL-MCNC: 2.23 MG/DL — HIGH (ref 0.5–1.3)
CULTURE RESULTS: SIGNIFICANT CHANGE UP
GLUCOSE BLDC GLUCOMTR-MCNC: 115 MG/DL — HIGH (ref 70–99)
GLUCOSE BLDC GLUCOMTR-MCNC: 135 MG/DL — HIGH (ref 70–99)
GLUCOSE BLDC GLUCOMTR-MCNC: 143 MG/DL — HIGH (ref 70–99)
GLUCOSE BLDC GLUCOMTR-MCNC: 150 MG/DL — HIGH (ref 70–99)
GLUCOSE BLDC GLUCOMTR-MCNC: 196 MG/DL — HIGH (ref 70–99)
GLUCOSE SERPL-MCNC: 119 MG/DL — HIGH (ref 70–99)
HCT VFR BLD CALC: 31.8 % — LOW (ref 34.5–45)
HGB BLD-MCNC: 10.5 G/DL — LOW (ref 11.5–15.5)
MCHC RBC-ENTMCNC: 29.8 PG — SIGNIFICANT CHANGE UP (ref 27–34)
MCHC RBC-ENTMCNC: 33 GM/DL — SIGNIFICANT CHANGE UP (ref 32–36)
MCV RBC AUTO: 90.2 FL — SIGNIFICANT CHANGE UP (ref 80–100)
PLATELET # BLD AUTO: 444 K/UL — HIGH (ref 150–400)
POTASSIUM SERPL-MCNC: 3.8 MMOL/L — SIGNIFICANT CHANGE UP (ref 3.5–5.3)
POTASSIUM SERPL-SCNC: 3.8 MMOL/L — SIGNIFICANT CHANGE UP (ref 3.5–5.3)
RBC # BLD: 3.52 M/UL — LOW (ref 3.8–5.2)
RBC # FLD: 14 % — SIGNIFICANT CHANGE UP (ref 10.3–14.5)
SODIUM SERPL-SCNC: 131 MMOL/L — LOW (ref 135–145)
SPECIMEN SOURCE: SIGNIFICANT CHANGE UP
VANCOMYCIN FLD-MCNC: 25.9 UG/ML
WBC # BLD: 11.6 K/UL — HIGH (ref 3.8–10.5)
WBC # FLD AUTO: 11.6 K/UL — HIGH (ref 3.8–10.5)

## 2019-07-11 PROCEDURE — 99232 SBSQ HOSP IP/OBS MODERATE 35: CPT

## 2019-07-11 RX ADMIN — MIDODRINE HYDROCHLORIDE 5 MILLIGRAM(S): 2.5 TABLET ORAL at 06:39

## 2019-07-11 RX ADMIN — HEPARIN SODIUM 5000 UNIT(S): 5000 INJECTION INTRAVENOUS; SUBCUTANEOUS at 06:38

## 2019-07-11 RX ADMIN — Medication 500 MILLIGRAM(S): at 14:16

## 2019-07-11 RX ADMIN — Medication 300 MILLIGRAM(S): at 14:16

## 2019-07-11 RX ADMIN — Medication 1 DROP(S): at 06:39

## 2019-07-11 RX ADMIN — Medication 15 MILLILITER(S): at 14:16

## 2019-07-11 RX ADMIN — Medication 1.5 MILLIGRAM(S): at 21:31

## 2019-07-11 RX ADMIN — MIDODRINE HYDROCHLORIDE 5 MILLIGRAM(S): 2.5 TABLET ORAL at 14:16

## 2019-07-11 RX ADMIN — HEPARIN SODIUM 5000 UNIT(S): 5000 INJECTION INTRAVENOUS; SUBCUTANEOUS at 18:49

## 2019-07-11 RX ADMIN — QUETIAPINE FUMARATE 25 MILLIGRAM(S): 200 TABLET, FILM COATED ORAL at 21:31

## 2019-07-11 RX ADMIN — GABAPENTIN 300 MILLIGRAM(S): 400 CAPSULE ORAL at 09:14

## 2019-07-11 RX ADMIN — GABAPENTIN 300 MILLIGRAM(S): 400 CAPSULE ORAL at 21:31

## 2019-07-11 RX ADMIN — ERGOCALCIFEROL 5000 UNIT(S): 1.25 CAPSULE ORAL at 14:16

## 2019-07-11 RX ADMIN — Medication 1: at 23:56

## 2019-07-11 RX ADMIN — FAMOTIDINE 20 MILLIGRAM(S): 10 INJECTION INTRAVENOUS at 14:16

## 2019-07-11 RX ADMIN — ZINC SULFATE TAB 220 MG (50 MG ZINC EQUIVALENT) 220 MILLIGRAM(S): 220 (50 ZN) TAB at 14:16

## 2019-07-11 RX ADMIN — TIZANIDINE 4 MILLIGRAM(S): 4 TABLET ORAL at 09:14

## 2019-07-11 RX ADMIN — Medication 1 DROP(S): at 18:50

## 2019-07-11 RX ADMIN — TIZANIDINE 4 MILLIGRAM(S): 4 TABLET ORAL at 21:31

## 2019-07-11 RX ADMIN — Medication 1 DROP(S): at 12:13

## 2019-07-11 RX ADMIN — CHLORHEXIDINE GLUCONATE 1 APPLICATION(S): 213 SOLUTION TOPICAL at 14:16

## 2019-07-11 NOTE — PROGRESS NOTE ADULT - SUBJECTIVE AND OBJECTIVE BOX
PULMONARY PROGRESS NOTE    MYRIAM HEATH  MRN-[id number]    Patient is a 69y old  Female who presents with a chief complaint of PEG tube dislodgement, fever, leukocytosis (11 Jul 2019 11:26)      HPI:     afebrile.    ROS:   -    ACTIVE MEDICATION LIST:  MEDICATIONS  (STANDING):  artificial  tears Solution 1 Drop(s) Both EYES four times a day  ascorbic acid 500 milliGRAM(s) Oral daily  buDESOnide  80 MICROgram(s)/formoterol 4.5 MICROgram(s) Inhaler 2 Puff(s) Inhalation two times a day  chlorhexidine 2% Cloths 1 Application(s) Topical daily  clonazePAM  Tablet 1.5 milliGRAM(s) Oral at bedtime  dextrose 5%. 1000 milliLiter(s) (50 mL/Hr) IV Continuous <Continuous>  dextrose 50% Injectable 12.5 Gram(s) IV Push once  dextrose 50% Injectable 25 Gram(s) IV Push once  dextrose 50% Injectable 25 Gram(s) IV Push once  ergocalciferol Drops 5000 Unit(s) Oral daily  famotidine    Tablet 20 milliGRAM(s) Oral daily  ferrous    sulfate Liquid 300 milliGRAM(s) Enteral Tube daily  gabapentin 300 milliGRAM(s) Oral two times a day  heparin  Injectable 5000 Unit(s) SubCutaneous every 12 hours  insulin lispro (HumaLOG) corrective regimen sliding scale   SubCutaneous every 6 hours  Medical Marijuana Awake Oil 2 milliLiter(s) 2 milliLiter(s) Enteral Tube <User Schedule>  Medical Marijuana Calm Oil 2 milliLiter(s) 2 milliLiter(s) Enteral Tube <User Schedule>  Medical Marijuana [place] Oil 2 milliLiter(s) 2 milliLiter(s) Enteral Tube <User Schedule>  midodrine 5 milliGRAM(s) Oral every 8 hours  multivitamin/minerals/iron Oral Solution (CENTRUM) 15 milliLiter(s) Oral daily  QUEtiapine 25 milliGRAM(s) Oral daily  tiZANidine 4 milliGRAM(s) Oral <User Schedule>  zinc sulfate 220 milliGRAM(s) Oral daily    MEDICATIONS  (PRN):  acetaminophen  Suppository .. 650 milliGRAM(s) Rectal every 6 hours PRN Temp greater or equal to 38C (100.4F), Mild Pain (1 - 3)  carboxymethylcellulose 0.5% (Preservative-Free) Ophthalmic Solution 1 Drop(s) Both EYES three times a day PRN dry eyes  dextrose 40% Gel 15 Gram(s) Oral once PRN Blood Glucose LESS THAN 70 milliGRAM(s)/deciliter  glucagon  Injectable 1 milliGRAM(s) IntraMuscular once PRN Glucose LESS THAN 70 milligrams/deciliter  nystatin Powder 1 Application(s) Topical three times a day PRN under breasts      EXAM:  Vital Signs Last 24 Hrs  T(C): 36.3 (11 Jul 2019 06:29), Max: 36.7 (10 Jul 2019 22:26)  T(F): 97.4 (11 Jul 2019 06:29), Max: 98.1 (10 Jul 2019 22:26)  HR: 107 (11 Jul 2019 06:29) (105 - 130)  BP: 114/74 (11 Jul 2019 06:29) (100/61 - 170/77)  BP(mean): --  RR: 18 (11 Jul 2019 06:29) (18 - 20)  SpO2: 93% (11 Jul 2019 06:29) (93% - 98%)    GENERAL: The patient is awake and alert in no apparent distress.     LUNGS: Clear to auscultation without wheezing, rales or rhonchi; respirations unlabored    HEART: Regular rate and rhythm without murmur.                            10.5   11.6  )-----------( 444      ( 11 Jul 2019 10:57 )             31.8       07-11    131<L>  |  95<L>  |  85<H>  ----------------------------<  119<H>  3.8   |  22  |  2.23<H>    Ca    10.2      11 Jul 2019 10:57          PROBLEM LIST:  69y Female with HEALTH ISSUES - PROBLEM Dx:  Fever: Fever  Type 2 diabetes mellitus without complication, without long-term current use of insulin:   Make sure you get your HgA1c checked every three months.  If you take oral diabetes medications, check your blood glucose two times a day.  If you take insulin, check your blood glucose before meals and at bedtime.  It&#x27;s important not to skip any meals.  Keep a log of your blood glucose results and always take it with you to your doctor appointments.  Keep a list of your current medications including injectables and over the counter medications and bring this medication list with you to all your doctor appointments.  If you have not seen your ophthalmologist this year call for appointment.  Check your feet daily for redness, sores, or openings. Do not self treat. If no improvement in two days call your primary care physician for an appointment.  Low blood sugar (hypoglycemia) is a blood sugar below 70mg/dl. Check your blood sugar if you feel signs/symptoms of hypoglycemia. If your blood sugar is below 70 take 15 grams of carbohydrates (ex 4 oz of apple juice, 3-4 glucose tablets, or 4-6 oz of regular soda) wait 15 minutes and repeat blood sugar to make sure it comes up above 70.  If your blood sugar is above 70 and you are due for a meal, have a meal.  If you are not due for a meal have a snack.  This snack helps keeps your blood sugar at a safe range.    Pressure injury of skin of sacral region, unspecified injury stage: Continue with wound care as instructed.   Maintain adequate nutrition, frequent repositioning , offloading with Zfloat boots.  Follow-up with your primary care physician and Wound Care Specialist within 1 week. Call for appointment.    Chronic deep vein thrombosis (DVT) of right lower extremity, unspecified vein: Take your &quot;blood thinners&quot; as prescribed.  Walking is encouraged, increase activity as tolerated.  If you develop new leg pain, swelling, and/or redness contact your healthcare provider.  If you develop new chest pain with difficulty breathing, a rapid heart rate and/or a feeling of passing out call emergency medical services 911.    Prophylactic measure: Prophylactic measure  Dementia without behavioral disturbance, unspecified dementia type: Continue with medications as prescribed by your doctor.  Maintain safe environment.  Maintain adequate nutrition, hydration.  Follow-up with your primary care physician within 1 week. Call for appointment.    Uncomplicated asthma, unspecified asthma severity, unspecified whether persistent: Stable.  Follow-up with your primary care physician within 1 week. Call for appointment.    Sepsis, due to unspecified organism: Take all antibiotics as ordered.  Call you Health care provider upon arrival home to make a one week follow up appointment.  If you develop fever, chills, malaise, or change in mental status call your Health Care Provider or go to the Emergency Department.  Nutrition is important, eat small frequent meals to help ensure you get adequate calories.  Do not stay in bed all day!  Increase your activity daily as tolerated.    PEG tube malfunction: PEG tube malfunction            RECS:  continue chest PT  continue nebs around the clock - noted mild elevation in HR.  aspiration precautions    discussed with        Please call with any questions.    Leigh Ann Resendez DO  [facility] Pulmonary/Sleep Medicine  [phone]

## 2019-07-11 NOTE — PROGRESS NOTE ADULT - ASSESSMENT
69 year old female with advanced dementia, she is nonverbal, has dysphagia, receives bolus G tube feeds ,had IR placement of G tube on 6/25/19.     Recommendations :  Monitor G tube site , keep site clean and dry   Keep patient is upright position, to best of ability during and after bolus TF to minimize aspiration risk   Turn and Position per Nursing  protocol        Lakeisha Alicia [facility] Gastroenterology

## 2019-07-11 NOTE — PROGRESS NOTE ADULT - ASSESSMENT
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 since admission, went to IR to have PEG replaced.  TRANG and hyperkalemia.    1. TRANG likely multifactorial and the vancomycin was likely a contributory factor  .    2. Hyponatremia from TRANG that seems to be improving  3. Nephrotic   4. SP Hyperkalemia and now normal on the new feeds   5. Anemia.     RECOMMEND:  -Off IVF now;  Awaiting bloods this am but I suspect that the creatinine   will start a slow decrease   - Appropriate response to the Blood xfusion.  Lasix given this admission without rash.    - On  Nephro till the creatinine improves    - Hold the vancomycin.  Trend its level please   - Renal dose all medications for a GFR about 25cc/min

## 2019-07-11 NOTE — CHART NOTE - NSCHARTNOTEFT_GEN_A_CORE
Pt seen for malnutrition follow up and consult per NP request. Pt 70 y/o F with PMH: multiple hospitalizations, dementia (non-verbal), dysphagia with PEG, functional quadriplegia, Blackman cath, chronic pressure ulcers in sacral and heels, asthma on Prednisone, HLD, CVA, chronic MRSA of right hip prothesis, left knee fracture, DM, anxiety, UTI, admitted with fever, leukocytosis, admitted with PEG dislodgement - S/P replacement in IR (06/25). Pt now with TRANG, hyperkalemia, plan to continue Nepro tube feeding in interim until kidney function improves per nephrology.    Source: Patient [ ]    Family [x ]     other [ x]  at bedside, RN     Diet : Bolus feeds vai PEG Nepro 240 mL (1 can) bolus every 6 hours (8am, 2pm, 8pm). This regimen at goal provides 1275 Kcal, 57g protein, 516 mL free water (33 Kcal/Kg, 1.5g protein/Kg based on dosing wt 38.6 Kg-6/24)         Per RN pt with no N+V, pt with BM today        Enteral /Parenteral Nutrition: Per RN and  pt tolerating bolus feeds well at goal with no GI distress 3 cans/day       Current Weight: Weight (kg): 35.9 (07-10 @ 05:11) no new wt   % Weight Change    Pertinent Medications: MEDICATIONS  (STANDING):  artificial  tears Solution 1 Drop(s) Both EYES four times a day  ascorbic acid 500 milliGRAM(s) Oral daily  buDESOnide  80 MICROgram(s)/formoterol 4.5 MICROgram(s) Inhaler 2 Puff(s) Inhalation two times a day  chlorhexidine 2% Cloths 1 Application(s) Topical daily  clonazePAM  Tablet 1.5 milliGRAM(s) Oral at bedtime  dextrose 5%. 1000 milliLiter(s) (50 mL/Hr) IV Continuous <Continuous>  dextrose 50% Injectable 12.5 Gram(s) IV Push once  dextrose 50% Injectable 25 Gram(s) IV Push once  dextrose 50% Injectable 25 Gram(s) IV Push once  ergocalciferol Drops 5000 Unit(s) Oral daily  famotidine    Tablet 20 milliGRAM(s) Oral daily  ferrous    sulfate Liquid 300 milliGRAM(s) Enteral Tube daily  gabapentin 300 milliGRAM(s) Oral two times a day  heparin  Injectable 5000 Unit(s) SubCutaneous every 12 hours  insulin lispro (HumaLOG) corrective regimen sliding scale   SubCutaneous every 6 hours  Medical Marijuana Awake Oil 2 milliLiter(s) 2 milliLiter(s) Enteral Tube <User Schedule>  Medical Marijuana Calm Oil 2 milliLiter(s) 2 milliLiter(s) Enteral Tube <User Schedule>  Medical Marijuana [place] Oil 2 milliLiter(s) 2 milliLiter(s) Enteral Tube <User Schedule>  midodrine 5 milliGRAM(s) Oral every 8 hours  multivitamin/minerals/iron Oral Solution (CENTRUM) 15 milliLiter(s) Oral daily  QUEtiapine 25 milliGRAM(s) Oral daily  tiZANidine 4 milliGRAM(s) Oral <User Schedule>  zinc sulfate 220 milliGRAM(s) Oral daily    MEDICATIONS  (PRN):  acetaminophen  Suppository .. 650 milliGRAM(s) Rectal every 6 hours PRN Temp greater or equal to 38C (100.4F), Mild Pain (1 - 3)  carboxymethylcellulose 0.5% (Preservative-Free) Ophthalmic Solution 1 Drop(s) Both EYES three times a day PRN dry eyes  dextrose 40% Gel 15 Gram(s) Oral once PRN Blood Glucose LESS THAN 70 milliGRAM(s)/deciliter  glucagon  Injectable 1 milliGRAM(s) IntraMuscular once PRN Glucose LESS THAN 70 milligrams/deciliter  nystatin Powder 1 Application(s) Topical three times a day PRN under breasts    Pertinent Labs:  07-11 Na131 mmol/L<L> Glu 119 mg/dL<H> K+ 3.8 mmol/L Cr  2.23 mg/dL<H> BUN 85 mg/dL<H> 07-05 Phos 4.6 mg/dL<H> 07-05 Alb 1.9 g/dL<L> 06-14 [id number] 5.0 %    CAPILLARY BLOOD GLUCOSE      POCT Blood Glucose.: 135 mg/dL (11 Jul 2019 15:03)  POCT Blood Glucose.: 115 mg/dL (11 Jul 2019 06:07)  POCT Blood Glucose.: 143 mg/dL (11 Jul 2019 00:10)  POCT Blood Glucose.: 151 mg/dL (10 Jul 2019 17:50)        Skin: 2+ generalized edema noted, skin noted with multiple pressure injuries stage 2 to L inner malleolus, stage 3 to L lower buttock, stage 4 to sacrum and unstageable to low lumbar, L malleolus, R medial lower leg, pt also noted with DTI to L inner thigh , R buttock and L heel  per nursing flowsheets     Estimated Needs:   [ x] no change since previous assessment  [ ] recalculated:       Previous Nutrition Diagnosis:     [ x] Malnutrition (mild)- ongoing    [x] Increased nutrient needs in setting of wound healing- ongoing             New Nutrition Diagnosis: [ ] not applicable     Interventions:     Recommend    1. Continue tube feeding as ordered  2. Continue multivitamin and vitamin C to further facilitate wound healing        Monitoring and Evaluation:     [ ] PO intake [x ] Tolerance to diet prescription [x ] weights [ x] follow up per protocol    [ ] other:

## 2019-07-11 NOTE — PROGRESS NOTE ADULT - SUBJECTIVE AND OBJECTIVE BOX
INTERVAL HPI/OVERNIGHT EVENTS:  Patient positioned in bed for comfort and safety, family member  bedside , patient has been tolerating bolus G tube feeds, had BM per RN      MEDICATIONS  (STANDING):  artificial  tears Solution 1 Drop(s) Both EYES four times a day  ascorbic acid 500 milliGRAM(s) Oral daily  buDESOnide  80 MICROgram(s)/formoterol 4.5 MICROgram(s) Inhaler 2 Puff(s) Inhalation two times a day  chlorhexidine 2% Cloths 1 Application(s) Topical daily  clonazePAM  Tablet 1.5 milliGRAM(s) Oral at bedtime  dextrose 5%. 1000 milliLiter(s) (50 mL/Hr) IV Continuous <Continuous>  dextrose 50% Injectable 12.5 Gram(s) IV Push once  dextrose 50% Injectable 25 Gram(s) IV Push once  dextrose 50% Injectable 25 Gram(s) IV Push once  ergocalciferol Drops 5000 Unit(s) Oral daily  famotidine    Tablet 20 milliGRAM(s) Oral daily  ferrous    sulfate Liquid 300 milliGRAM(s) Enteral Tube daily  gabapentin 300 milliGRAM(s) Oral two times a day  heparin  Injectable 5000 Unit(s) SubCutaneous every 12 hours  insulin lispro (HumaLOG) corrective regimen sliding scale   SubCutaneous every 6 hours  Medical Marijuana Awake Oil 2 milliLiter(s) 2 milliLiter(s) Enteral Tube <User Schedule>  Medical Marijuana Calm Oil 2 milliLiter(s) 2 milliLiter(s) Enteral Tube <User Schedule>  Medical Marijuana [place] Oil 2 milliLiter(s) 2 milliLiter(s) Enteral Tube <User Schedule>  midodrine 5 milliGRAM(s) Oral every 8 hours  multivitamin/minerals/iron Oral Solution (CENTRUM) 15 milliLiter(s) Oral daily  QUEtiapine 25 milliGRAM(s) Oral daily  tiZANidine 4 milliGRAM(s) Oral <User Schedule>  zinc sulfate 220 milliGRAM(s) Oral daily    MEDICATIONS  (PRN):  acetaminophen  Suppository .. 650 milliGRAM(s) Rectal every 6 hours PRN Temp greater or equal to 38C (100.4F), Mild Pain (1 - 3)  carboxymethylcellulose 0.5% (Preservative-Free) Ophthalmic Solution 1 Drop(s) Both EYES three times a day PRN dry eyes  dextrose 40% Gel 15 Gram(s) Oral once PRN Blood Glucose LESS THAN 70 milliGRAM(s)/deciliter  glucagon  Injectable 1 milliGRAM(s) IntraMuscular once PRN Glucose LESS THAN 70 milligrams/deciliter  nystatin Powder 1 Application(s) Topical three times a day PRN under breasts      Allergies    ASA; dye contrast (Anaphylaxis)  aspirin (Short breath)  divalproex sodium (Other (Unknown))  Flowers and Plants (Short breath)  Haldol (Other (Unknown))  penicillin (Short breath; Rash)  sulfa drugs (Short breath; Rash)  vancomycin (Rash; Urticaria; Hives)  Xanax (Other (Unknown))    Intolerances        Review of Systems:    General:  No fevers    ENT:  + dysphagia  Resp:  No dyspnea, cough, or wheezing   GI: tolerating bolus G tube feeds         Vital Signs Last 24 Hrs  T(C): 36.3 (11 Jul 2019 06:29), Max: 36.7 (10 Jul 2019 22:26)  T(F): 97.4 (11 Jul 2019 06:29), Max: 98.1 (10 Jul 2019 22:26)  HR: 107 (11 Jul 2019 06:29) (105 - 130)  BP: 114/74 (11 Jul 2019 06:29) (100/61 - 170/77)  BP(mean): --  RR: 18 (11 Jul 2019 06:29) (18 - 20)  SpO2: 93% (11 Jul 2019 06:29) (93% - 98%)    PHYSICAL EXAM:    Constitutional: non toxic, contractures noted   Respiratory: anterior chest wall clear to auscultation with no wheezing  Cardiovascular: S1 and S2, RRR  Gastrointestinal: abdomen soft, G tube site moist, External G tube bumper positioned to 4 cm skin level   Extremities: contractures , no  cyanosis  Skin: No jaundice       LABS:                        10.5   11.6  )-----------( 444      ( 11 Jul 2019 10:57 )             31.8     07-11    131<L>  |  95<L>  |  85<H>  ----------------------------<  119<H>  3.8   |  22  |  2.23<H>    Ca    10.2      11 Jul 2019 10:57              RADIOLOGY & ADDITIONAL TESTS:

## 2019-07-11 NOTE — CHART NOTE - NSCHARTNOTEFT_GEN_A_CORE
70 yo female with  Dysphagia 2/2 CVA, PEG, TRANG (ICD10: R13.12, I63.9, Z93.1, N17.9) who now requires Nepro formula for PEG tube feedings due to her TRANG.      Ritu Morataya, [facility] [id number]

## 2019-07-11 NOTE — PROGRESS NOTE ADULT - SUBJECTIVE AND OBJECTIVE BOX
NEPHROLOGY-[facility] [phone]        Patient seen and examined in bed.  She was the same        MEDICATIONS  (STANDING):  artificial  tears Solution 1 Drop(s) Both EYES four times a day  ascorbic acid 500 milliGRAM(s) Oral daily  buDESOnide  80 MICROgram(s)/formoterol 4.5 MICROgram(s) Inhaler 2 Puff(s) Inhalation two times a day  chlorhexidine 2% Cloths 1 Application(s) Topical daily  clonazePAM  Tablet 1.5 milliGRAM(s) Oral at bedtime  dextrose 5%. 1000 milliLiter(s) (50 mL/Hr) IV Continuous <Continuous>  dextrose 50% Injectable 12.5 Gram(s) IV Push once  dextrose 50% Injectable 25 Gram(s) IV Push once  dextrose 50% Injectable 25 Gram(s) IV Push once  ergocalciferol Drops 5000 Unit(s) Oral daily  famotidine    Tablet 20 milliGRAM(s) Oral daily  ferrous    sulfate Liquid 300 milliGRAM(s) Enteral Tube daily  gabapentin 300 milliGRAM(s) Oral two times a day  heparin  Injectable 5000 Unit(s) SubCutaneous every 12 hours  insulin lispro (HumaLOG) corrective regimen sliding scale   SubCutaneous every 6 hours  Medical Marijuana Awake Oil 2 milliLiter(s) 2 milliLiter(s) Enteral Tube <User Schedule>  Medical Marijuana Calm Oil 2 milliLiter(s) 2 milliLiter(s) Enteral Tube <User Schedule>  Medical Marijuana [place] Oil 2 milliLiter(s) 2 milliLiter(s) Enteral Tube <User Schedule>  midodrine 5 milliGRAM(s) Oral every 8 hours  multivitamin/minerals/iron Oral Solution (CENTRUM) 15 milliLiter(s) Oral daily  QUEtiapine 25 milliGRAM(s) Oral daily  tiZANidine 4 milliGRAM(s) Oral <User Schedule>  zinc sulfate 220 milliGRAM(s) Oral daily      VITAL:  T(C): , Max: 36.7 (07-10-19 @ 22:26)  T(F): , Max: 98.1 (07-10-19 @ 22:26)  HR: 107 (07-11-19 @ 06:29)  BP: 114/74 (07-11-19 @ 06:29)  BP(mean): --  RR: 18 (07-11-19 @ 06:29)  SpO2: 93% (07-11-19 @ 06:29)  Wt(kg): --    I and O's:    07-10 @ 07:01  -  07-11 @ 07:00  --------------------------------------------------------  IN: 0 mL / OUT: 775 mL / NET: -775 mL          PHYSICAL EXAM:    Constitutional: NAD; contracted  Neck:  No JVD  Respiratory: poor effort  Cardiovascular: S1 and S2  Gastrointestinal: BS+, soft, NT/ND  Extremities: +  peripheral edema  Neurological: unable   : No Blackman  Skin: No rashes  Access: Not applicable    LABS:                        9.4    10.95 )-----------( 431      ( 10 Jul 2019 10:19 )             30.2     07-10    132<L>  |  96  |  85<H>  ----------------------------<  106<H>  4.1   |  22  |  1.99<H>    Ca    9.5      10 Jul 2019 06:50            Urine Studies:          RADIOLOGY & ADDITIONAL STUDIES:

## 2019-07-11 NOTE — PROGRESS NOTE ADULT - SUBJECTIVE AND OBJECTIVE BOX
---___---___---___---___---___---___ ---___---___---___---___---___---___---___---___---                  M E D I C A L   A T T E N D I N G   P R O G R E S S   N O T E  ---___---___---___---___---___---___ ---___---___---___---___---___---___---___---___---        ================================================    ++CHIEF COMPLAINT:   Patient is a 69y old  Female who presents with a chief complaint of PEG tube dislodgement, fever, leukocytosis (11 Jul 2019 11:26)      cr still trending higher     ---___---___---___---___---___---  PAST MEDICAL / Surgical  HISTORY:  PAST MEDICAL & SURGICAL HISTORY:  Type 2 diabetes mellitus  Dementia  DVT, lower extremity  CVA (cerebral vascular accident)  Fatty pancreas  PNA (pneumonia)  Pulmonary HTN  IGT (impaired glucose tolerance)  Ulcerative colitis  Acid reflux  Anxiety  Depression  Mouth sores  HLD (hyperlipidemia)  Asthma  S/P percutaneous endoscopic gastrostomy (PEG) tube placement: for dysphagia  Humeral head fracture  H/O: hysterectomy  H/O cataract extraction, left  History of knee replacement      ---___---___---___---___---___---  FAMILY HISTORY:   FAMILY HISTORY:  Family history of dementia (Grandparent)  Family history of colon cancer (Grandparent)        ---___---___---___---___---___---  ALLERGIES:   Allergies    ASA; dye contrast (Anaphylaxis)  aspirin (Short breath)  divalproex sodium (Other (Unknown))  Flowers and Plants (Short breath)  Haldol (Other (Unknown))  penicillin (Short breath; Rash)  sulfa drugs (Short breath; Rash)  vancomycin (Rash; Urticaria; Hives)  Xanax (Other (Unknown))    Intolerances        ---___---___---___---___---___---  MEDICATIONS:  MEDICATIONS  (STANDING):  artificial  tears Solution 1 Drop(s) Both EYES four times a day  ascorbic acid 500 milliGRAM(s) Oral daily  buDESOnide  80 MICROgram(s)/formoterol 4.5 MICROgram(s) Inhaler 2 Puff(s) Inhalation two times a day  chlorhexidine 2% Cloths 1 Application(s) Topical daily  clonazePAM  Tablet 1.5 milliGRAM(s) Oral at bedtime  dextrose 5%. 1000 milliLiter(s) (50 mL/Hr) IV Continuous <Continuous>  dextrose 50% Injectable 12.5 Gram(s) IV Push once  dextrose 50% Injectable 25 Gram(s) IV Push once  dextrose 50% Injectable 25 Gram(s) IV Push once  ergocalciferol Drops 5000 Unit(s) Oral daily  famotidine    Tablet 20 milliGRAM(s) Oral daily  ferrous    sulfate Liquid 300 milliGRAM(s) Enteral Tube daily  gabapentin 300 milliGRAM(s) Oral two times a day  heparin  Injectable 5000 Unit(s) SubCutaneous every 12 hours  insulin lispro (HumaLOG) corrective regimen sliding scale   SubCutaneous every 6 hours  Medical Marijuana Awake Oil 2 milliLiter(s) 2 milliLiter(s) Enteral Tube <User Schedule>  Medical Marijuana Calm Oil 2 milliLiter(s) 2 milliLiter(s) Enteral Tube <User Schedule>  Medical Marijuana [place] Oil 2 milliLiter(s) 2 milliLiter(s) Enteral Tube <User Schedule>  midodrine 5 milliGRAM(s) Oral every 8 hours  multivitamin/minerals/iron Oral Solution (CENTRUM) 15 milliLiter(s) Oral daily  QUEtiapine 25 milliGRAM(s) Oral daily  tiZANidine 4 milliGRAM(s) Oral <User Schedule>  zinc sulfate 220 milliGRAM(s) Oral daily    MEDICATIONS  (PRN):  acetaminophen  Suppository .. 650 milliGRAM(s) Rectal every 6 hours PRN Temp greater or equal to 38C (100.4F), Mild Pain (1 - 3)  carboxymethylcellulose 0.5% (Preservative-Free) Ophthalmic Solution 1 Drop(s) Both EYES three times a day PRN dry eyes  dextrose 40% Gel 15 Gram(s) Oral once PRN Blood Glucose LESS THAN 70 milliGRAM(s)/deciliter  glucagon  Injectable 1 milliGRAM(s) IntraMuscular once PRN Glucose LESS THAN 70 milligrams/deciliter  nystatin Powder 1 Application(s) Topical three times a day PRN under breasts      ---___---___---___---___---___---  REVIEW OF SYSTEM:    unable to obtain     ---___---___---___---___---___---  VITAL SIGNS:  69y , CAPILLARY BLOOD GLUCOSE      POCT Blood Glucose.: 115 mg/dL (11 Jul 2019 06:07)    T(C): 36.3 (07-11-19 @ 06:29), Max: 36.7 (07-10-19 @ 22:26)  HR: 107 (07-11-19 @ 06:29) (105 - 130)  BP: 114/74 (07-11-19 @ 06:29) (100/61 - 170/77)  RR: 18 (07-11-19 @ 06:29) (18 - 20)  SpO2: 93% (07-11-19 @ 06:29) (93% - 98%)  ---___---___---___---___---___---  PHYSICAL EXAM:    GEN: A&O X 0 , NAD , comfortable  HEENT: NCAT, PERRL, MMM, hearing intact  Neck: supple , no JVD  CVS: S1S2 , regular , No M/R/G appreciated  PULM: CTA B/L,  no W/R/R appreciated  ABD.: soft. non tender, non distended,  bowel sounds present  Extrem: intact pulses , no edema left leg in brace   Derm: No rash , no ecchymoses stage 4 sacral decubitus         ---___---___---___---___---___---            LAB AND IMAGING:                          10.5   11.6  )-----------( 444      ( 11 Jul 2019 10:57 )             31.8               07-11    131<L>  |  95<L>  |  85<H>  ----------------------------<  119<H>  3.8   |  22  |  2.23<H>    Ca    10.2      11 Jul 2019 10:57                                  [All pertinent / recent Imaging reviewed]         ---___---___---___---___---___---___ ---___---___---___---___---                         A S S E S S M E N T   A N D   P L A N :      HEALTH ISSUES - PROBLEM Dx:  TRANG still trending higher  renal to adjust meds  pneumonia /sepsis id following   sacral decubitus   continue wound care  asthma on budesonide  klonopin and seroquel for agitation   dm2 from chronic prednisone use on RISS   gabapentinnand medical marijuans for chrionic pain   dementia supportive care                  -GI/DVT Prophylaxis.    --------------------------------------------  Case discussed with   Education given on   ___________________________  Thank you,  Deniz Bolden  [phone]

## 2019-07-12 LAB
ANION GAP SERPL CALC-SCNC: 16 MMOL/L — SIGNIFICANT CHANGE UP (ref 5–17)
APPEARANCE UR: ABNORMAL
BILIRUB UR-MCNC: NEGATIVE — SIGNIFICANT CHANGE UP
BUN SERPL-MCNC: 92 MG/DL — HIGH (ref 7–23)
CALCIUM SERPL-MCNC: 10.6 MG/DL — HIGH (ref 8.4–10.5)
CHLORIDE SERPL-SCNC: 96 MMOL/L — SIGNIFICANT CHANGE UP (ref 96–108)
CO2 SERPL-SCNC: 21 MMOL/L — LOW (ref 22–31)
COLOR SPEC: YELLOW — SIGNIFICANT CHANGE UP
CREAT SERPL-MCNC: 2.32 MG/DL — HIGH (ref 0.5–1.3)
DIFF PNL FLD: ABNORMAL
GLUCOSE BLDC GLUCOMTR-MCNC: 135 MG/DL — HIGH (ref 70–99)
GLUCOSE BLDC GLUCOMTR-MCNC: 163 MG/DL — HIGH (ref 70–99)
GLUCOSE BLDC GLUCOMTR-MCNC: 177 MG/DL — HIGH (ref 70–99)
GLUCOSE SERPL-MCNC: 118 MG/DL — HIGH (ref 70–99)
GLUCOSE UR QL: NEGATIVE — SIGNIFICANT CHANGE UP
HCT VFR BLD CALC: 26.9 % — LOW (ref 34.5–45)
HGB BLD-MCNC: 9 G/DL — LOW (ref 11.5–15.5)
KETONES UR-MCNC: NEGATIVE — SIGNIFICANT CHANGE UP
LEUKOCYTE ESTERASE UR-ACNC: ABNORMAL
MCHC RBC-ENTMCNC: 30.4 PG — SIGNIFICANT CHANGE UP (ref 27–34)
MCHC RBC-ENTMCNC: 33.7 GM/DL — SIGNIFICANT CHANGE UP (ref 32–36)
MCV RBC AUTO: 90.1 FL — SIGNIFICANT CHANGE UP (ref 80–100)
NITRITE UR-MCNC: NEGATIVE — SIGNIFICANT CHANGE UP
PH UR: 6.5 — SIGNIFICANT CHANGE UP (ref 5–8)
PLATELET # BLD AUTO: 400 K/UL — SIGNIFICANT CHANGE UP (ref 150–400)
POTASSIUM SERPL-MCNC: 3.9 MMOL/L — SIGNIFICANT CHANGE UP (ref 3.5–5.3)
POTASSIUM SERPL-SCNC: 3.9 MMOL/L — SIGNIFICANT CHANGE UP (ref 3.5–5.3)
PROT UR-MCNC: 100 — SIGNIFICANT CHANGE UP
RBC # BLD: 2.98 M/UL — LOW (ref 3.8–5.2)
RBC # FLD: 13.9 % — SIGNIFICANT CHANGE UP (ref 10.3–14.5)
SODIUM SERPL-SCNC: 133 MMOL/L — LOW (ref 135–145)
SP GR SPEC: 1.01 — SIGNIFICANT CHANGE UP (ref 1.01–1.02)
UROBILINOGEN FLD QL: NEGATIVE — SIGNIFICANT CHANGE UP
VANCOMYCIN FLD-MCNC: 24.4 UG/ML — SIGNIFICANT CHANGE UP
WBC # BLD: 13.2 K/UL — HIGH (ref 3.8–10.5)
WBC # FLD AUTO: 13.2 K/UL — HIGH (ref 3.8–10.5)

## 2019-07-12 PROCEDURE — 71045 X-RAY EXAM CHEST 1 VIEW: CPT | Mod: 26

## 2019-07-12 PROCEDURE — 99232 SBSQ HOSP IP/OBS MODERATE 35: CPT

## 2019-07-12 RX ORDER — ACETAMINOPHEN 500 MG
650 TABLET ORAL ONCE
Refills: 0 | Status: COMPLETED | OUTPATIENT
Start: 2019-07-12 | End: 2019-07-12

## 2019-07-12 RX ORDER — SODIUM HYPOCHLORITE 0.125 %
1 SOLUTION, NON-ORAL MISCELLANEOUS
Refills: 0 | Status: DISCONTINUED | OUTPATIENT
Start: 2019-07-12 | End: 2019-08-09

## 2019-07-12 RX ORDER — BACITRACIN ZINC 500 UNIT/G
1 OINTMENT IN PACKET (EA) TOPICAL
Refills: 0 | Status: DISCONTINUED | OUTPATIENT
Start: 2019-07-12 | End: 2019-08-09

## 2019-07-12 RX ORDER — SODIUM CHLORIDE 9 MG/ML
1000 INJECTION INTRAMUSCULAR; INTRAVENOUS; SUBCUTANEOUS
Refills: 0 | Status: DISCONTINUED | OUTPATIENT
Start: 2019-07-12 | End: 2019-07-13

## 2019-07-12 RX ADMIN — Medication 500 MILLIGRAM(S): at 11:53

## 2019-07-12 RX ADMIN — Medication 1 APPLICATION(S): at 18:43

## 2019-07-12 RX ADMIN — GABAPENTIN 300 MILLIGRAM(S): 400 CAPSULE ORAL at 08:32

## 2019-07-12 RX ADMIN — CHLORHEXIDINE GLUCONATE 1 APPLICATION(S): 213 SOLUTION TOPICAL at 12:11

## 2019-07-12 RX ADMIN — MIDODRINE HYDROCHLORIDE 5 MILLIGRAM(S): 2.5 TABLET ORAL at 00:24

## 2019-07-12 RX ADMIN — HEPARIN SODIUM 5000 UNIT(S): 5000 INJECTION INTRAVENOUS; SUBCUTANEOUS at 18:44

## 2019-07-12 RX ADMIN — Medication 15 MILLILITER(S): at 11:54

## 2019-07-12 RX ADMIN — Medication 1.5 MILLIGRAM(S): at 21:48

## 2019-07-12 RX ADMIN — Medication 650 MILLIGRAM(S): at 23:45

## 2019-07-12 RX ADMIN — Medication 1: at 18:43

## 2019-07-12 RX ADMIN — FAMOTIDINE 20 MILLIGRAM(S): 10 INJECTION INTRAVENOUS at 11:54

## 2019-07-12 RX ADMIN — MIDODRINE HYDROCHLORIDE 5 MILLIGRAM(S): 2.5 TABLET ORAL at 14:28

## 2019-07-12 RX ADMIN — Medication 300 MILLIGRAM(S): at 11:54

## 2019-07-12 RX ADMIN — Medication 260 MILLIGRAM(S): at 23:23

## 2019-07-12 RX ADMIN — ZINC SULFATE TAB 220 MG (50 MG ZINC EQUIVALENT) 220 MILLIGRAM(S): 220 (50 ZN) TAB at 11:55

## 2019-07-12 RX ADMIN — Medication 1 APPLICATION(S): at 21:47

## 2019-07-12 RX ADMIN — Medication 1 DROP(S): at 12:08

## 2019-07-12 RX ADMIN — BUDESONIDE AND FORMOTEROL FUMARATE DIHYDRATE 2 PUFF(S): 160; 4.5 AEROSOL RESPIRATORY (INHALATION) at 18:45

## 2019-07-12 RX ADMIN — SODIUM CHLORIDE 100 MILLILITER(S): 9 INJECTION INTRAMUSCULAR; INTRAVENOUS; SUBCUTANEOUS at 14:25

## 2019-07-12 RX ADMIN — HEPARIN SODIUM 5000 UNIT(S): 5000 INJECTION INTRAVENOUS; SUBCUTANEOUS at 06:35

## 2019-07-12 RX ADMIN — TIZANIDINE 4 MILLIGRAM(S): 4 TABLET ORAL at 21:48

## 2019-07-12 RX ADMIN — TIZANIDINE 4 MILLIGRAM(S): 4 TABLET ORAL at 08:32

## 2019-07-12 RX ADMIN — GABAPENTIN 300 MILLIGRAM(S): 400 CAPSULE ORAL at 21:48

## 2019-07-12 RX ADMIN — QUETIAPINE FUMARATE 25 MILLIGRAM(S): 200 TABLET, FILM COATED ORAL at 21:48

## 2019-07-12 RX ADMIN — Medication 1: at 11:53

## 2019-07-12 RX ADMIN — MIDODRINE HYDROCHLORIDE 5 MILLIGRAM(S): 2.5 TABLET ORAL at 21:48

## 2019-07-12 RX ADMIN — MIDODRINE HYDROCHLORIDE 5 MILLIGRAM(S): 2.5 TABLET ORAL at 06:35

## 2019-07-12 RX ADMIN — Medication 1 DROP(S): at 18:42

## 2019-07-12 NOTE — PROGRESS NOTE ADULT - ASSESSMENT
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  Fever and leukocytosis now. Abnormal CT chest.    , patient found with hypoxia and AMS suspect from aspiration event.            aspiration/pna     all cultures negative and temps better   off ab  hopefully creat will come down off vanco   unresponsive today  reculture for signs  of infection  Her cachexia is severe

## 2019-07-12 NOTE — PROGRESS NOTE ADULT - ASSESSMENT
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 since admission, went to IR to have PEG replaced.  TRANG and hyperkalemia.    1. TRANG likely multifactorial and the vancomycin was likely a contributory factor  .    2. Hyponatremia from TRANG that seems to be improving  3. Nephrotic;  Hypercalcemia   4. SP Hyperkalemia and now normal on the new feeds   5. Anemia.     RECOMMEND:  -Off IVF now;  The creatinine will not start the trend to decline till the vanco level is around or under 15.  Unfortunately just need to trend and watch    - Appropriate response to the Blood xfusion.  Lasix given this admission without rash.    - On  Nephro till the creatinine improves    - Hold the vancomycin.  Trend its level please   -Will need to dc the Vit D given the hypercalcemia

## 2019-07-12 NOTE — PROGRESS NOTE ADULT - SUBJECTIVE AND OBJECTIVE BOX
INTERVAL HPI/OVERNIGHT EVENTS:  Patient positioned in bed for comfort and safety, spouse bedside , patient tolerating bolus tube feeds, had BM     MEDICATIONS  (STANDING):  artificial  tears Solution 1 Drop(s) Both EYES four times a day  ascorbic acid 500 milliGRAM(s) Oral daily  buDESOnide  80 MICROgram(s)/formoterol 4.5 MICROgram(s) Inhaler 2 Puff(s) Inhalation two times a day  chlorhexidine 2% Cloths 1 Application(s) Topical daily  clonazePAM  Tablet 1.5 milliGRAM(s) Oral at bedtime  dextrose 5%. 1000 milliLiter(s) (50 mL/Hr) IV Continuous <Continuous>  dextrose 50% Injectable 12.5 Gram(s) IV Push once  dextrose 50% Injectable 25 Gram(s) IV Push once  dextrose 50% Injectable 25 Gram(s) IV Push once  famotidine    Tablet 20 milliGRAM(s) Oral daily  ferrous    sulfate Liquid 300 milliGRAM(s) Enteral Tube daily  gabapentin 300 milliGRAM(s) Oral two times a day  heparin  Injectable 5000 Unit(s) SubCutaneous every 12 hours  insulin lispro (HumaLOG) corrective regimen sliding scale   SubCutaneous every 6 hours  Medical Marijuana Awake Oil 2 milliLiter(s) 2 milliLiter(s) Enteral Tube <User Schedule>  Medical Marijuana Calm Oil 2 milliLiter(s) 2 milliLiter(s) Enteral Tube <User Schedule>  Medical Marijuana [place] Oil 2 milliLiter(s) 2 milliLiter(s) Enteral Tube <User Schedule>  midodrine 5 milliGRAM(s) Oral every 8 hours  multivitamin/minerals/iron Oral Solution (CENTRUM) 15 milliLiter(s) Oral daily  QUEtiapine 25 milliGRAM(s) Oral daily  tiZANidine 4 milliGRAM(s) Oral <User Schedule>  zinc sulfate 220 milliGRAM(s) Oral daily    MEDICATIONS  (PRN):  acetaminophen  Suppository .. 650 milliGRAM(s) Rectal every 6 hours PRN Temp greater or equal to 38C (100.4F), Mild Pain (1 - 3)  carboxymethylcellulose 0.5% (Preservative-Free) Ophthalmic Solution 1 Drop(s) Both EYES three times a day PRN dry eyes  dextrose 40% Gel 15 Gram(s) Oral once PRN Blood Glucose LESS THAN 70 milliGRAM(s)/deciliter  glucagon  Injectable 1 milliGRAM(s) IntraMuscular once PRN Glucose LESS THAN 70 milligrams/deciliter  nystatin Powder 1 Application(s) Topical three times a day PRN under breasts      Allergies    ASA; dye contrast (Anaphylaxis)  aspirin (Short breath)  divalproex sodium (Other (Unknown))  Flowers and Plants (Short breath)  Haldol (Other (Unknown))  penicillin (Short breath; Rash)  sulfa drugs (Short breath; Rash)  vancomycin (Rash; Urticaria; Hives)  Xanax (Other (Unknown))    Intolerances        Review of Systems:  Unable to obtain from patient   General:  No fevers or shaking chills,   ENT:  on nasal canula , +  dysphagia  Resp:  No dyspnea, cough, or wheezing reported   GI: tolerating g tube feeds           Vital Signs Last 24 Hrs  T(C): 35.3 (2019 11:49), Max: 36.6 (2019 05:50)  T(F): 95.6 (2019 11:49), Max: 97.8 (2019 05:50)  HR: 99 (2019 11:49) (79 - 125)  BP: 92/58 (2019 11:49) (86/50 - 156/81)  BP(mean): --  RR: 18 (2019 11:49) (18 - 18)  SpO2: 95% (2019 11:49) (94% - 98%)    PHYSICAL EXAM:    Constitutional: non toxic and in NAD, contractures noted   HENT: nasal canula   Respiratory: anterior chest wall clear to auscultation   Cardiovascular: S1 and S2, RRR  Gastrointestinal: soft , non distended , area under external PEG bumper cleaned/dried ,  single drain sponge changed   Extremities: contractures      LABS:                        10.5   11.6  )-----------( 444      ( 2019 10:57 )             31.8     07-12    133<L>  |  96  |  92<H>  ----------------------------<  118<H>  3.9   |  21<L>  |  2.32<H>    Ca    10.6<H>      2019 09:13        Urinalysis Basic - ( 2019 23:27 )    Color: Yellow / Appearance: Slightly Turbid / S.015 / pH: x  Gluc: x / Ketone: Negative  / Bili: Negative / Urobili: Negative   Blood: x / Protein: 100 / Nitrite: Negative   Leuk Esterase: Large / RBC: 13 /hpf /  /HPF   Sq Epi: x / Non Sq Epi: 0 / Bacteria: Moderate          RADIOLOGY & ADDITIONAL TESTS: INTERVAL HPI/OVERNIGHT EVENTS:  Patient positioned in bed for comfort and safety, spouse bedside , patient tolerating bolus tube feeds, had BM.  Hypothermic today, concern for worsening infection.    MEDICATIONS  (STANDING):  artificial  tears Solution 1 Drop(s) Both EYES four times a day  ascorbic acid 500 milliGRAM(s) Oral daily  buDESOnide  80 MICROgram(s)/formoterol 4.5 MICROgram(s) Inhaler 2 Puff(s) Inhalation two times a day  chlorhexidine 2% Cloths 1 Application(s) Topical daily  clonazePAM  Tablet 1.5 milliGRAM(s) Oral at bedtime  dextrose 5%. 1000 milliLiter(s) (50 mL/Hr) IV Continuous <Continuous>  dextrose 50% Injectable 12.5 Gram(s) IV Push once  dextrose 50% Injectable 25 Gram(s) IV Push once  dextrose 50% Injectable 25 Gram(s) IV Push once  famotidine    Tablet 20 milliGRAM(s) Oral daily  ferrous    sulfate Liquid 300 milliGRAM(s) Enteral Tube daily  gabapentin 300 milliGRAM(s) Oral two times a day  heparin  Injectable 5000 Unit(s) SubCutaneous every 12 hours  insulin lispro (HumaLOG) corrective regimen sliding scale   SubCutaneous every 6 hours  Medical Marijuana Awake Oil 2 milliLiter(s) 2 milliLiter(s) Enteral Tube <User Schedule>  Medical Marijuana Calm Oil 2 milliLiter(s) 2 milliLiter(s) Enteral Tube <User Schedule>  Medical Marijuana [place] Oil 2 milliLiter(s) 2 milliLiter(s) Enteral Tube <User Schedule>  midodrine 5 milliGRAM(s) Oral every 8 hours  multivitamin/minerals/iron Oral Solution (CENTRUM) 15 milliLiter(s) Oral daily  QUEtiapine 25 milliGRAM(s) Oral daily  tiZANidine 4 milliGRAM(s) Oral <User Schedule>  zinc sulfate 220 milliGRAM(s) Oral daily    MEDICATIONS  (PRN):  acetaminophen  Suppository .. 650 milliGRAM(s) Rectal every 6 hours PRN Temp greater or equal to 38C (100.4F), Mild Pain (1 - 3)  carboxymethylcellulose 0.5% (Preservative-Free) Ophthalmic Solution 1 Drop(s) Both EYES three times a day PRN dry eyes  dextrose 40% Gel 15 Gram(s) Oral once PRN Blood Glucose LESS THAN 70 milliGRAM(s)/deciliter  glucagon  Injectable 1 milliGRAM(s) IntraMuscular once PRN Glucose LESS THAN 70 milligrams/deciliter  nystatin Powder 1 Application(s) Topical three times a day PRN under breasts      Allergies    ASA; dye contrast (Anaphylaxis)  aspirin (Short breath)  divalproex sodium (Other (Unknown))  Flowers and Plants (Short breath)  Haldol (Other (Unknown))  penicillin (Short breath; Rash)  sulfa drugs (Short breath; Rash)  vancomycin (Rash; Urticaria; Hives)  Xanax (Other (Unknown))    Intolerances        Review of Systems:  Unable to obtain from patient   General:  No fevers or shaking chills,   ENT:  on nasal canula , +  dysphagia  Resp:  No dyspnea, cough, or wheezing reported   GI: tolerating g tube feeds           Vital Signs Last 24 Hrs  T(C): 35.3 (2019 11:49), Max: 36.6 (2019 05:50)  T(F): 95.6 (2019 11:49), Max: 97.8 (2019 05:50)  HR: 99 (2019 11:49) (79 - 125)  BP: 92/58 (2019 11:49) (86/50 - 156/81)  BP(mean): --  RR: 18 (2019 11:49) (18 - 18)  SpO2: 95% (2019 11:49) (94% - 98%)    PHYSICAL EXAM:    Constitutional: non toxic and in NAD, contractures noted   HENT: nasal canula   Respiratory: anterior chest wall clear to auscultation   Cardiovascular: S1 and S2, RRR  Gastrointestinal: soft , non distended , area under external PEG bumper cleaned/dried ,  single drain sponge changed   Extremities: contractures      LABS:                        10.5   11.6  )-----------( 444      ( 2019 10:57 )             31.8     07-12    133<L>  |  96  |  92<H>  ----------------------------<  118<H>  3.9   |  21<L>  |  2.32<H>    Ca    10.6<H>      2019 09:13        Urinalysis Basic - ( 2019 23:27 )    Color: Yellow / Appearance: Slightly Turbid / S.015 / pH: x  Gluc: x / Ketone: Negative  / Bili: Negative / Urobili: Negative   Blood: x / Protein: 100 / Nitrite: Negative   Leuk Esterase: Large / RBC: 13 /hpf /  /HPF   Sq Epi: x / Non Sq Epi: 0 / Bacteria: Moderate          RADIOLOGY & ADDITIONAL TESTS:

## 2019-07-12 NOTE — PROGRESS NOTE ADULT - ASSESSMENT
69 year old female with advanced dementia, she is nonverbal, with dysphagia tolerating  bolus G tube feeds. Patient had IR placement of G tube on 6/25/19.      Recommendations :  Monitor G tube site,, keep site clean and dry   Keep patient is upright position, to best of ability during and after bolus TF to minimize aspiration risk   Turn and position per nursing  protocol   Defer U/A to medical management     Lakeisha Alicia [facility] Gastroenterology 69 year old female with advanced dementia, nonverbal, with dysphagia tolerating  bolus G tube feeds. Patient had IR placement of G tube on 6/25/19. Currently hypothermic.     Recommendations :  Monitor G tube site,, keep site clean and dry   Keep patient is upright position, to best of ability during and after bolus TF to minimize aspiration risk   Turn and position per nursing  protocol   Defer U/A and antibiotics to medical management     Lakeisha Alicia [facility] Gastroenterology

## 2019-07-12 NOTE — PROGRESS NOTE ADULT - SUBJECTIVE AND OBJECTIVE BOX
PULMONARY PROGRESS NOTE    MYRIAM HEATH  MRN-[id number]    Patient is a 69y old  Female who presents with a chief complaint of PEG tube dislodgement, fever, leukocytosis (12 Jul 2019 12:04)      HPI:  lethargic now; hypothermic   on 1.5L 02    ROS:   -    ACTIVE MEDICATION LIST:  MEDICATIONS  (STANDING):  artificial  tears Solution 1 Drop(s) Both EYES four times a day  ascorbic acid 500 milliGRAM(s) Oral daily  BACItracin   Ointment 1 Application(s) Topical two times a day  buDESOnide  80 MICROgram(s)/formoterol 4.5 MICROgram(s) Inhaler 2 Puff(s) Inhalation two times a day  chlorhexidine 2% Cloths 1 Application(s) Topical daily  clonazePAM  Tablet 1.5 milliGRAM(s) Oral at bedtime  dextrose 5%. 1000 milliLiter(s) (50 mL/Hr) IV Continuous <Continuous>  dextrose 50% Injectable 12.5 Gram(s) IV Push once  dextrose 50% Injectable 25 Gram(s) IV Push once  dextrose 50% Injectable 25 Gram(s) IV Push once  famotidine    Tablet 20 milliGRAM(s) Oral daily  ferrous    sulfate Liquid 300 milliGRAM(s) Enteral Tube daily  gabapentin 300 milliGRAM(s) Oral two times a day  heparin  Injectable 5000 Unit(s) SubCutaneous every 12 hours  insulin lispro (HumaLOG) corrective regimen sliding scale   SubCutaneous every 6 hours  Medical Marijuana Awake Oil 2 milliLiter(s) 2 milliLiter(s) Enteral Tube <User Schedule>  Medical Marijuana Calm Oil 2 milliLiter(s) 2 milliLiter(s) Enteral Tube <User Schedule>  Medical Marijuana [place] Oil 2 milliLiter(s) 2 milliLiter(s) Enteral Tube <User Schedule>  midodrine 5 milliGRAM(s) Oral every 8 hours  multivitamin/minerals/iron Oral Solution (CENTRUM) 15 milliLiter(s) Oral daily  QUEtiapine 25 milliGRAM(s) Oral daily  sodium chloride 0.9%. 1000 milliLiter(s) (100 mL/Hr) IV Continuous <Continuous>  tiZANidine 4 milliGRAM(s) Oral <User Schedule>  zinc sulfate 220 milliGRAM(s) Oral daily    MEDICATIONS  (PRN):  acetaminophen  Suppository .. 650 milliGRAM(s) Rectal every 6 hours PRN Temp greater or equal to 38C (100.4F), Mild Pain (1 - 3)  carboxymethylcellulose 0.5% (Preservative-Free) Ophthalmic Solution 1 Drop(s) Both EYES three times a day PRN dry eyes  dextrose 40% Gel 15 Gram(s) Oral once PRN Blood Glucose LESS THAN 70 milliGRAM(s)/deciliter  glucagon  Injectable 1 milliGRAM(s) IntraMuscular once PRN Glucose LESS THAN 70 milligrams/deciliter  nystatin Powder 1 Application(s) Topical three times a day PRN under breasts      EXAM:  Vital Signs Last 24 Hrs  T(C): 35.3 (12 Jul 2019 11:49), Max: 36.6 (12 Jul 2019 05:50)  T(F): 95.6 (12 Jul 2019 11:49), Max: 97.8 (12 Jul 2019 05:50)  HR: 99 (12 Jul 2019 11:49) (99 - 125)  BP: 92/58 (12 Jul 2019 11:49) (86/50 - 156/81)  BP(mean): --  RR: 18 (12 Jul 2019 11:49) (18 - 18)  SpO2: 95% (12 Jul 2019 11:49) (95% - 98%)    GENERAL: The patient is lethargic    LUNGS: poor effort;  respirations unlabored    HEART: Regular rate and rhythm without murmur.                            10.5   11.6  )-----------( 444      ( 11 Jul 2019 10:57 )             31.8       07-12    133<L>  |  96  |  92<H>  ----------------------------<  118<H>  3.9   |  21<L>  |  2.32<H>    Ca    10.6<H>      12 Jul 2019 09:13          PROBLEM LIST:  69y Female with HEALTH ISSUES - PROBLEM Dx:  Fever: Fever  Type 2 diabetes mellitus without complication, without long-term current use of insulin:   Make sure you get your HgA1c checked every three months.  If you take oral diabetes medications, check your blood glucose two times a day.  If you take insulin, check your blood glucose before meals and at bedtime.  It&#x27;s important not to skip any meals.  Keep a log of your blood glucose results and always take it with you to your doctor appointments.  Keep a list of your current medications including injectables and over the counter medications and bring this medication list with you to all your doctor appointments.  If you have not seen your ophthalmologist this year call for appointment.  Check your feet daily for redness, sores, or openings. Do not self treat. If no improvement in two days call your primary care physician for an appointment.  Low blood sugar (hypoglycemia) is a blood sugar below 70mg/dl. Check your blood sugar if you feel signs/symptoms of hypoglycemia. If your blood sugar is below 70 take 15 grams of carbohydrates (ex 4 oz of apple juice, 3-4 glucose tablets, or 4-6 oz of regular soda) wait 15 minutes and repeat blood sugar to make sure it comes up above 70.  If your blood sugar is above 70 and you are due for a meal, have a meal.  If you are not due for a meal have a snack.  This snack helps keeps your blood sugar at a safe range.    Pressure injury of skin of sacral region, unspecified injury stage: Continue with wound care as instructed.   Maintain adequate nutrition, frequent repositioning , offloading with Zfloat boots.  Follow-up with your primary care physician and Wound Care Specialist within 1 week. Call for appointment.    Chronic deep vein thrombosis (DVT) of right lower extremity, unspecified vein: Take your &quot;blood thinners&quot; as prescribed.  Walking is encouraged, increase activity as tolerated.  If you develop new leg pain, swelling, and/or redness contact your healthcare provider.  If you develop new chest pain with difficulty breathing, a rapid heart rate and/or a feeling of passing out call emergency medical services 911.    Prophylactic measure: Prophylactic measure  Dementia without behavioral disturbance, unspecified dementia type: Continue with medications as prescribed by your doctor.  Maintain safe environment.  Maintain adequate nutrition, hydration.  Follow-up with your primary care physician within 1 week. Call for appointment.    Uncomplicated asthma, unspecified asthma severity, unspecified whether persistent: Stable.  Follow-up with your primary care physician within 1 week. Call for appointment.    Sepsis, due to unspecified organism: Take all antibiotics as ordered.  Call you Health care provider upon arrival home to make a one week follow up appointment.  If you develop fever, chills, malaise, or change in mental status call your Health Care Provider or go to the Emergency Department.  Nutrition is important, eat small frequent meals to help ensure you get adequate calories.  Do not stay in bed all day!  Increase your activity daily as tolerated.    PEG tube malfunction: PEG tube malfunction            RECS:  f/u cultures as per ID  neb around the clock  aspiration precautions  02 for comfort       Please call with any questions.    Leigh Ann Resendez DO  [facility] Pulmonary/Sleep Medicine  [phone]

## 2019-07-12 NOTE — PROGRESS NOTE ADULT - SUBJECTIVE AND OBJECTIVE BOX
NEPHROLOGY-[facility] [phone]        Patient seen and examined in bed.  There is no new changes         MEDICATIONS  (STANDING):  artificial  tears Solution 1 Drop(s) Both EYES four times a day  ascorbic acid 500 milliGRAM(s) Oral daily  buDESOnide  80 MICROgram(s)/formoterol 4.5 MICROgram(s) Inhaler 2 Puff(s) Inhalation two times a day  chlorhexidine 2% Cloths 1 Application(s) Topical daily  clonazePAM  Tablet 1.5 milliGRAM(s) Oral at bedtime  dextrose 5%. 1000 milliLiter(s) (50 mL/Hr) IV Continuous <Continuous>  dextrose 50% Injectable 12.5 Gram(s) IV Push once  dextrose 50% Injectable 25 Gram(s) IV Push once  dextrose 50% Injectable 25 Gram(s) IV Push once  ergocalciferol Drops 5000 Unit(s) Oral daily  famotidine    Tablet 20 milliGRAM(s) Oral daily  ferrous    sulfate Liquid 300 milliGRAM(s) Enteral Tube daily  gabapentin 300 milliGRAM(s) Oral two times a day  heparin  Injectable 5000 Unit(s) SubCutaneous every 12 hours  insulin lispro (HumaLOG) corrective regimen sliding scale   SubCutaneous every 6 hours  Medical Marijuana Awake Oil 2 milliLiter(s) 2 milliLiter(s) Enteral Tube <User Schedule>  Medical Marijuana Calm Oil 2 milliLiter(s) 2 milliLiter(s) Enteral Tube <User Schedule>  Medical Marijuana [place] Oil 2 milliLiter(s) 2 milliLiter(s) Enteral Tube <User Schedule>  midodrine 5 milliGRAM(s) Oral every 8 hours  multivitamin/minerals/iron Oral Solution (CENTRUM) 15 milliLiter(s) Oral daily  QUEtiapine 25 milliGRAM(s) Oral daily  tiZANidine 4 milliGRAM(s) Oral <User Schedule>  zinc sulfate 220 milliGRAM(s) Oral daily      VITAL:  T(C): , Max: 36.6 (19 @ 05:50)  T(F): , Max: 97.8 (19 @ 05:50)  HR: 99 (19 @ 05:50)  BP: 111/70 (19 @ 05:50)  BP(mean): --  RR: 18 (19 @ 05:50)  SpO2: 95% (19 @ 05:50)  Wt(kg): --    I and O's:     @ 07:  -   @ 07:00  --------------------------------------------------------  IN: 0 mL / OUT: 175 mL / NET: -175 mL     @ 07:  -   @ 09:59  --------------------------------------------------------  IN: 340 mL / OUT: 0 mL / NET: 340 mL          PHYSICAL EXAM:    Constitutional: cachetic and contracted   Neck:  No JVD  Respiratory: poor effort   Cardiovascular: S1 and S2  Gastrointestinal: BS+, soft, NT/ND  Extremities: +  peripheral edema  Neurological:unable   Psychiatric:unable   : +  Blackman  Skin: No rashes  Access: Not applicable    LABS:                        10.5   11.6  )-----------( 444      ( 2019 10:57 )             31.8     07-12    133<L>  |  96  |  92<H>  ----------------------------<  118<H>  3.9   |  21<L>  |  2.32<H>    Ca    10.6<H>      2019 09:13            Urine Studies:  Urinalysis Basic - ( 2019 23:27 )    Color: Yellow / Appearance: Slightly Turbid / S.015 / pH: x  Gluc: x / Ketone: Negative  / Bili: Negative / Urobili: Negative   Blood: x / Protein: 100 / Nitrite: Negative   Leuk Esterase: Large / RBC: 13 /hpf /  /HPF   Sq Epi: x / Non Sq Epi: 0 / Bacteria: Moderate            RADIOLOGY & ADDITIONAL STUDIES:

## 2019-07-12 NOTE — PROGRESS NOTE ADULT - SUBJECTIVE AND OBJECTIVE BOX
infectious diseases progress note:    Patient is a 69y old  Female who presents with a chief complaint of PEG tube dislodgement, fever, leukocytosis (2019 11:41)        Gastrostomy malfunction           Allergies    ASA; dye contrast (Anaphylaxis)  aspirin (Short breath)  divalproex sodium (Other (Unknown))  Flowers and Plants (Short breath)  Haldol (Other (Unknown))  penicillin (Short breath; Rash)  sulfa drugs (Short breath; Rash)  vancomycin (Rash; Urticaria; Hives)  Xanax (Other (Unknown))    Intolerances        ANTIBIOTICS/RELEVANT:  antimicrobials    immunologic:    OTHER:  acetaminophen  Suppository .. 650 milliGRAM(s) Rectal every 6 hours PRN  artificial  tears Solution 1 Drop(s) Both EYES four times a day  ascorbic acid 500 milliGRAM(s) Oral daily  buDESOnide  80 MICROgram(s)/formoterol 4.5 MICROgram(s) Inhaler 2 Puff(s) Inhalation two times a day  carboxymethylcellulose 0.5% (Preservative-Free) Ophthalmic Solution 1 Drop(s) Both EYES three times a day PRN  chlorhexidine 2% Cloths 1 Application(s) Topical daily  clonazePAM  Tablet 1.5 milliGRAM(s) Oral at bedtime  dextrose 40% Gel 15 Gram(s) Oral once PRN  dextrose 5%. 1000 milliLiter(s) IV Continuous <Continuous>  dextrose 50% Injectable 12.5 Gram(s) IV Push once  dextrose 50% Injectable 25 Gram(s) IV Push once  dextrose 50% Injectable 25 Gram(s) IV Push once  ergocalciferol Drops 5000 Unit(s) Oral daily  famotidine    Tablet 20 milliGRAM(s) Oral daily  ferrous    sulfate Liquid 300 milliGRAM(s) Enteral Tube daily  gabapentin 300 milliGRAM(s) Oral two times a day  glucagon  Injectable 1 milliGRAM(s) IntraMuscular once PRN  heparin  Injectable 5000 Unit(s) SubCutaneous every 12 hours  insulin lispro (HumaLOG) corrective regimen sliding scale   SubCutaneous every 6 hours  Medical Marijuana Awake Oil 2 milliLiter(s) 2 milliLiter(s) Enteral Tube <User Schedule>  Medical Marijuana Calm Oil 2 milliLiter(s) 2 milliLiter(s) Enteral Tube <User Schedule>  Medical Marijuana [place] Oil 2 milliLiter(s) 2 milliLiter(s) Enteral Tube <User Schedule>  midodrine 5 milliGRAM(s) Oral every 8 hours  multivitamin/minerals/iron Oral Solution (CENTRUM) 15 milliLiter(s) Oral daily  nystatin Powder 1 Application(s) Topical three times a day PRN  QUEtiapine 25 milliGRAM(s) Oral daily  tiZANidine 4 milliGRAM(s) Oral <User Schedule>  zinc sulfate 220 milliGRAM(s) Oral daily      Objective:  Vital Signs Last 24 Hrs  T(C): 36.6 (2019 05:50), Max: 36.6 (2019 05:50)  T(F): 97.8 (2019 05:50), Max: 97.8 (2019 05:50)  HR: 99 (2019 05:50) (79 - 125)  BP: 111/70 (2019 05:50) (86/50 - 156/81)  BP(mean): --  RR: 18 (2019 05:50) (18 - 18)  SpO2: 95% (2019 05:50) (94% - 98%)       Ear/Nose/Throat: no oral lesion, no sinus tenderness on percussion	  Neck:no JVD, no lymphadenopathy, supple  Respiratory: CTA maryjane  Cardiovascular: S1S2 RRR, no murmurs  Gastrointestinal:soft, (+) BS, no HSM  Extremities contracted         LABS:                        10.5   11.6  )-----------( 444      ( 2019 10:57 )             31.8     07-11    131<L>  |  95<L>  |  85<H>  ----------------------------<  119<H>  3.8   |  22  |  2.23<H>    Ca    10.2      2019 10:57        Urinalysis Basic - ( 2019 23:27 )    Color: Yellow / Appearance: Slightly Turbid / S.015 / pH: x  Gluc: x / Ketone: Negative  / Bili: Negative / Urobili: Negative   Blood: x / Protein: 100 / Nitrite: Negative   Leuk Esterase: Large / RBC: 13 /hpf /  /HPF   Sq Epi: x / Non Sq Epi: 0 / Bacteria: Moderate          MICROBIOLOGY:    RECENT CULTURES:   @ 23:36 .Blood                No growth at 5 days.     @ 13:06 .Blood                No growth at 5 days.          RESPIRATORY CULTURES:              RADIOLOGY & ADDITIONAL STUDIES:        Pager [phone]  After 5 pm/weekends or if no response :[phone]

## 2019-07-12 NOTE — PROGRESS NOTE ADULT - ASSESSMENT
mpression:    Sacral Stage 4 pressure injury  Thoracic spine unstageable pressure injury  L lateral malleolus unstageable pressure injury  Right medial lower leg deep tissue injury    Recommend:  1.) Topical therapy:  a.) Sacrum - irrigate with 1/4 strength Dakins solution, pat dry, pack Dakins moistened meaghan , cover/pack lightly with  gauze, cover with DSD, secure with tegederm BID  b.) Thoracic spine wound - cleanse with NS, apply , Cavilon daily  c.) L lateral malleolus - cleanse with NS, pat dry, apply allevyn Life silicone border dressing q3 days  d.) R medial lower leg - cleanse with NS, pat dry, apply medihoney, cover with allevyn Life silicone border dressing daily  2.) Emollient therapy: Moisturize intact skin w/ SWEEN cr eam daily  3.) Continue alternating air with low air loss surface  4.) Turn and reposition q2 hours  5.) Nutrition per GI  6.) Incontinence management - pericare, incontinence cleanse, underpads  7.) Care as per medicine will follow w/ you    Upon discharge [facility] [address] [place] [address] [phone]    Will follow.  Thank you for this consult  Wendy Hammonds, NP-C, [facility] [id number]

## 2019-07-12 NOTE — PROGRESS NOTE ADULT - SUBJECTIVE AND OBJECTIVE BOX
---___---___---___---___---___---___ ---___---___---___---___---___---___---___---___---                  M E D I C A L   A T T E N D I N G   P R O G R E S S   N O T E  ---___---___---___---___---___---___ ---___---___---___---___---___---___---___---___---        ================================================    ++CHIEF COMPLAINT:   Patient is a 69y old  Female who presents with a chief complaint of PEG tube dislodgement, fever, leukocytosis (2019 07:43)      libia now awaiting new labs and swollen in the legs today     ---___---___---___---___---___---  PAST MEDICAL / Surgical  HISTORY:  PAST MEDICAL & SURGICAL HISTORY:  Type 2 diabetes mellitus  Dementia  DVT, lower extremity  CVA (cerebral vascular accident)  Fatty pancreas  PNA (pneumonia)  Pulmonary HTN  IGT (impaired glucose tolerance)  Ulcerative colitis  Acid reflux  Anxiety  Depression  Mouth sores  HLD (hyperlipidemia)  Asthma  S/P percutaneous endoscopic gastrostomy (PEG) tube placement: for dysphagia  Humeral head fracture  H/O: hysterectomy  H/O cataract extraction, left  History of knee replacement      ---___---___---___---___---___---  FAMILY HISTORY:   FAMILY HISTORY:  Family history of dementia (Grandparent)  Family history of colon cancer (Grandparent)        ---___---___---___---___---___---  ALLERGIES:   Allergies    ASA; dye contrast (Anaphylaxis)  aspirin (Short breath)  divalproex sodium (Other (Unknown))  Flowers and Plants (Short breath)  Haldol (Other (Unknown))  penicillin (Short breath; Rash)  sulfa drugs (Short breath; Rash)  vancomycin (Rash; Urticaria; Hives)  Xanax (Other (Unknown))    Intolerances        ---___---___---___---___---___---  MEDICATIONS:  MEDICATIONS  (STANDING):  artificial  tears Solution 1 Drop(s) Both EYES four times a day  ascorbic acid 500 milliGRAM(s) Oral daily  buDESOnide  80 MICROgram(s)/formoterol 4.5 MICROgram(s) Inhaler 2 Puff(s) Inhalation two times a day  chlorhexidine 2% Cloths 1 Application(s) Topical daily  clonazePAM  Tablet 1.5 milliGRAM(s) Oral at bedtime  dextrose 5%. 1000 milliLiter(s) (50 mL/Hr) IV Continuous <Continuous>  dextrose 50% Injectable 12.5 Gram(s) IV Push once  dextrose 50% Injectable 25 Gram(s) IV Push once  dextrose 50% Injectable 25 Gram(s) IV Push once  ergocalciferol Drops 5000 Unit(s) Oral daily  famotidine    Tablet 20 milliGRAM(s) Oral daily  ferrous    sulfate Liquid 300 milliGRAM(s) Enteral Tube daily  gabapentin 300 milliGRAM(s) Oral two times a day  heparin  Injectable 5000 Unit(s) SubCutaneous every 12 hours  insulin lispro (HumaLOG) corrective regimen sliding scale   SubCutaneous every 6 hours  Medical Marijuana Awake Oil 2 milliLiter(s) 2 milliLiter(s) Enteral Tube <User Schedule>  Medical Marijuana Calm Oil 2 milliLiter(s) 2 milliLiter(s) Enteral Tube <User Schedule>  Medical Marijuana [place] Oil 2 milliLiter(s) 2 milliLiter(s) Enteral Tube <User Schedule>  midodrine 5 milliGRAM(s) Oral every 8 hours  multivitamin/minerals/iron Oral Solution (CENTRUM) 15 milliLiter(s) Oral daily  QUEtiapine 25 milliGRAM(s) Oral daily  tiZANidine 4 milliGRAM(s) Oral <User Schedule>  zinc sulfate 220 milliGRAM(s) Oral daily    MEDICATIONS  (PRN):  acetaminophen  Suppository .. 650 milliGRAM(s) Rectal every 6 hours PRN Temp greater or equal to 38C (100.4F), Mild Pain (1 - 3)  carboxymethylcellulose 0.5% (Preservative-Free) Ophthalmic Solution 1 Drop(s) Both EYES three times a day PRN dry eyes  dextrose 40% Gel 15 Gram(s) Oral once PRN Blood Glucose LESS THAN 70 milliGRAM(s)/deciliter  glucagon  Injectable 1 milliGRAM(s) IntraMuscular once PRN Glucose LESS THAN 70 milligrams/deciliter  nystatin Powder 1 Application(s) Topical three times a day PRN under breasts      ---___---___---___---___---___---  REVIEW OF SYSTEM:    unable to obtain     ---___---___---___---___---___---  VITAL SIGNS:  69y , CAPILLARY BLOOD GLUCOSE      POCT Blood Glucose.: 135 mg/dL (2019 06:12)    T(C): 36.6 (19 @ 05:50), Max: 36.6 (19 @ 05:50)  HR: 99 (19 @ 05:50) (79 - 125)  BP: 111/70 (19 @ 05:50) (86/50 - 156/81)  RR: 18 (19 @ 05:50) (18 - 18)  SpO2: 95% (19 @ 05:50) (94% - 98%)  ---___---___---___---___---___---  PHYSICAL EXAM:    GEN: A&O X 0 , NAD , comfortable  HEENT: NCAT, PERRL, MMM, hearing intact redness noted around the left eye likely fiction burn   Neck: supple , no JVD  CVS: S1S2 , regular , No M/R/G appreciated  PULM: CTA B/L,  no W/R/R appreciated  ABD.: soft. non tender, non distended,  bowel sounds present peg noted  Extrem: intact pulses ,  edema  to LE   Derm: sacral decubitus noted unchanged         ---___---___---___---___---___---            LAB AND IMAGING:                          10.5   11.6  )-----------( 444      ( 2019 10:57 )             31.8               07-11    131<L>  |  95<L>  |  85<H>  ----------------------------<  119<H>  3.8   |  22  |  2.23<H>    Ca    10.2      2019 10:57                              Urinalysis Basic - ( 2019 23:27 )    Color: Yellow / Appearance: Slightly Turbid / S.015 / pH: x  Gluc: x / Ketone: Negative  / Bili: Negative / Urobili: Negative   Blood: x / Protein: 100 / Nitrite: Negative   Leuk Esterase: Large / RBC: 13 /hpf /  /HPF   Sq Epi: x / Non Sq Epi: 0 / Bacteria: Moderate        [All pertinent / recent Imaging reviewed]         ---___---___---___---___---___---___ ---___---___---___---___---                         A S S E S S M E N T   A N D   P L A N :      HEALTH ISSUES - PROBLEM Dx:  libia trend labs  can this be obstructive nephropathy   pneumoia improving on current meds   ua (+) bacteria treated   chronic pain on medical marijuana  agitation on klonopin and seroquel  stage 4 sacral decubitus  continue wound care   follow labs vancomycin level trending down   -GI/DVT Prophylaxis.    --------------------------------------------  Case discussed with   Education given on   ___________________________  Thank you,  Deniz Bolden  [phone]

## 2019-07-12 NOTE — CHART NOTE - NSCHARTNOTEFT_GEN_A_CORE
Pt seen and examined for low BP and hypothermia. Pt lethargic, facial grimace  to touch, nonverbal. Unable to obtain ROS . at bedside, reports pt with new erythema around left eye  noted this morning, and sediment in gavin catheter.   vital signs: 92/20-61-22-SpO2 95% on O2 1.5L NC-T95.6F rectally.  HEENT: left periorbital with erythema, nontender. No drainage or redness  within left eye.  CV: S1S2, RRR  Lungs: effort normal, clear anteriorly and right posterior. Decreased to left base. No rales , no wheezes.  Abd: BS(+), soft , NT/ND. PEG tube intact,   Ext: 1-2+ edema BLE. LUE with trace to 1+ edema.          69 year old female with advanced dementia,  nonverbal,  dysphagia, PEG, afib, AOCD, chronic gavin, chronic pain on medical marijuana, agitation on klonopin and seroquel, stage 4 sacral decubitus followed by Wound Care.         Pt presented with leukocytosis , fever, now s/p course of antibiotics.  Patient had IR placement of G tube on 6/25/19. Hospital course complicated by TRANG likely due to obstructive nephropathy, respiratory distress 2/2 aspiration PNA treated with antibiotic, now improved. UA positive bacturia. Now with low blood pressure and hypothermia concerning for new infection and left periorbital erythema.   -IVF NS 500ml over 5 hrs. Discussed with and approved by Cardiology Dr. Whitehead and Renal Dr. Ramos  -Blood culture x 2  -Hypotherma blanket prn Temp < 98F  -Bacitracin ointment BID to left periorbital erythema  -D/w attending Dr. Bolden. Per attending: IVF per cardiology and nephrology. Agree with rest of plan.  -D/w pt   -D/w RN.       Ritu Morataya, NP [facility] [address] Pt seen and examined for low BP and hypothermia. Pt lethargic, facial grimace  to touch, nonverbal. Unable to obtain ROS . at bedside, reports pt with new erythema around left eye  noted this morning, and sediment in gavin catheter.   vital signs: 92/99-24-86-SpO2 95% on O2 1.5L NC-T95.6F rectally.  HEENT: left periorbital with erythema, nontender. No drainage or redness  within left eye.  CV: S1S2, RRR  Lungs: effort normal, clear anteriorly and right posterior. Decreased to left base. No rales , no wheezes.  Abd: BS(+), soft , NT/ND. PEG tube intact,   Ext: 1-2+ edema BLE. LUE with trace to 1+ edema.          69 year old female with advanced dementia,  nonverbal,  dysphagia, PEG, CVA,  , functional quadriplegia, AOCD, chronic gavin, chronic pain on medical marijuana, agitation on klonopin and seroquel, stage 4 sacral decubitus followed by Wound Care.         Pt presented with leukocytosis , fever, now s/p course of antibiotics.  Patient had IR placement of G tube on 6/25/19. Hospital course complicated by TRANG likely due to obstructive nephropathy, respiratory distress 2/2 aspiration PNA treated with antibiotic, now improved. UA positive bacturia. Now with low blood pressure and hypothermia concerning for new infection and left periorbital erythema.   -IVF NS 500ml over 5 hrs. Discussed with and approved by Cardiology Dr. Whitehead and Renal Dr. Ramos  -Blood culture x 2  -Hypotherma blanket prn Temp < 98F  -Bacitracin ointment BID to left periorbital erythema  -D/w attending Dr. Bolden. Per attending: IVF per cardiology and nephrology. Agree with rest of plan.  -D/w pt   -D/w RN.       Ritu Morataya, [facility] [address]

## 2019-07-12 NOTE — PROGRESS NOTE ADULT - SUBJECTIVE AND OBJECTIVE BOX
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 She has been to IR for replacement of PEG tube. Her hospital course has been complicated by TRANG, hyperkalemia and hypercalcemia.    Patient was seen with Dr. Shin and NAVJOT. Mr. Colon was at the bedside to provide history ROS. She is well known to and followed by the [facility]. she was encountered on an alternating air with low air loss surface in a Left turned position. She was incontinent of large volume soft stool which grossly contaminated the sacral wound    PAST MEDICAL & SURGICAL HISTORY:  Type 2 diabetes mellitus  Dementia  DVT, lower extremity  CVA (cerebral vascular accident)  Fatty pancreas  PNA (pneumonia)  Pulmonary HTN  IGT (impaired glucose tolerance)  Ulcerative colitis  Acid reflux  Anxiety  Depression  Mouth sores  HLD (hyperlipidemia)  Asthma  S/P percutaneous endoscopic gastrostomy (PEG) tube placement: for dysphagia  Humeral head fracture  H/O: hysterectomy  H/O cataract extraction, left  History of knee replacement    REVIEW OF SYSTEMS  Patient is non-verbal. The ROS was provided by spouse who was at bedside during this visit.    General: chronically ill with multiple prolonged hospitalization, fever this am  Respiratory and Thorax: asthma on chronic prednisone  Gastrointestinal:	PEG tube dislodged	  Genitourinary: multiple UTIs  Musculoskeletal:	 severe contractions in bilateral upper and lower extremities  Neurological: baseline non-verbal, opens eyes	  Endocrine: diabetes, glycemic control with oral meds at home, s/s insulin as inpatient  Skin: multiple pressure injuries, chronic    MEDICATIONS  (STANDING):  artificial  tears Solution 1 Drop(s) Both EYES four times a day  ascorbic acid 500 milliGRAM(s) Oral daily  BACItracin   Ointment 1 Application(s) Topical two times a day  buDESOnide  80 MICROgram(s)/formoterol 4.5 MICROgram(s) Inhaler 2 Puff(s) Inhalation two times a day  chlorhexidine 2% Cloths 1 Application(s) Topical daily  clonazePAM  Tablet 1.5 milliGRAM(s) Oral at bedtime  dextrose 5%. 1000 milliLiter(s) (50 mL/Hr) IV Continuous <Continuous>  dextrose 50% Injectable 12.5 Gram(s) IV Push once  dextrose 50% Injectable 25 Gram(s) IV Push once  dextrose 50% Injectable 25 Gram(s) IV Push once  famotidine    Tablet 20 milliGRAM(s) Oral daily  ferrous    sulfate Liquid 300 milliGRAM(s) Enteral Tube daily  gabapentin 300 milliGRAM(s) Oral two times a day  heparin  Injectable 5000 Unit(s) SubCutaneous every 12 hours  insulin lispro (HumaLOG) corrective regimen sliding scale   SubCutaneous every 6 hours  Medical Marijuana Awake Oil 2 milliLiter(s) 2 milliLiter(s) Enteral Tube <User Schedule>  Medical Marijuana Calm Oil 2 milliLiter(s) 2 milliLiter(s) Enteral Tube <User Schedule>  Medical Marijuana [place] Oil 2 milliLiter(s) 2 milliLiter(s) Enteral Tube <User Schedule>  midodrine 5 milliGRAM(s) Oral every 8 hours  multivitamin/minerals/iron Oral Solution (CENTRUM) 15 milliLiter(s) Oral daily  QUEtiapine 25 milliGRAM(s) Oral daily  tiZANidine 4 milliGRAM(s) Oral <User Schedule>  zinc sulfate 220 milliGRAM(s) Oral daily    MEDICATIONS  (PRN):  acetaminophen  Suppository .. 650 milliGRAM(s) Rectal every 6 hours PRN Temp greater or equal to 38C (100.4F), Mild Pain (1 - 3)  carboxymethylcellulose 0.5% (Preservative-Free) Ophthalmic Solution 1 Drop(s) Both EYES three times a day PRN dry eyes  dextrose 40% Gel 15 Gram(s) Oral once PRN Blood Glucose LESS THAN 70 milliGRAM(s)/deciliter  glucagon  Injectable 1 milliGRAM(s) IntraMuscular once PRN Glucose LESS THAN 70 milligrams/deciliter  nystatin Powder 1 Application(s) Topical three times a day PRN under breasts    Allergies    ASA; dye contrast (Anaphylaxis)  aspirin (Short breath)  divalproex sodium (Other (Unknown))  Flowers and Plants (Short breath)  Haldol (Other (Unknown))  penicillin (Short breath; Rash)  sulfa drugs (Short breath; Rash)  vancomycin (Rash; Urticaria; Hives)  Xanax (Other (Unknown))    Vital Signs Last 24 Hrs  T(C): 35.3 (2019 11:49), Max: 36.6 (2019 05:50)  T(F): 95.6 (2019 11:49), Max: 97.8 (2019 05:50)  HR: 99 (2019 11:49) (99 - 125)  BP: 92/58 (2019 11:49) (86/50 - 156/81)  BP(mean): --  RR: 18 (2019 11:49) (18 - 18)  SpO2: 95% (2019 11:49) (95% - 98%)    Physical Exam:  General: cachectic, frail, chronically ill appearing  Respiratory: no cough or SOB, O2 NC in place  Gastrointestinal soft NT/ND (+) PEG  Neurology: not following commands, responds to 's questions by nodding head slightly yes or no  Musculoskeletal: severe upper and lower extremity contractions, Left leg with brace in place  Vascular: + BLE DP/PT pulses palpable, BLE equally warm, no acute ischemia noted  Skin:    Thoracic spine wound 100% covered with soft, moist, yellow slough, L 6.0cm x W 5.5cm x D 0.3cm moderate serosanguinous drainage, no induration, fluctuance, periwound intact.  Sacral wound to through muscle with no palpable bone, slough on right aspect of the wound edge, red with some granulation tissue, L 11cmc x W 12cm x D 4.5cm , > 2cm circumferential undermining. There is no induration, fluctuance, moderate serosanguinous drainage, periwound as with some blotchy redness    Left ankle lateral malleolus no necrotic tissue, white fibrinous tissue wound base, open edges, scant amount of serosangiunous drainage, L 2cm x L 1.5cm x D negligible, no induration, fluctuance or erythema.   Right medial lower leg wound with approx. 50% purple/maroon discoloration and approx 50% slough L 5.0cm x W 4.0cm x D 0.2cm moderate serosangiunous drainage, no erythema, induration, fluctuance or odor.     LABS:      133<L>  |  96  |  92<H>  ----------------------------<  118<H>  3.9   |  21<L>  |  2.32<H>    Ca    10.6<H>      2019 09:13                            10.5   11.6  )-----------( 444      ( 2019 10:57 )             31.8       Urinalysis Basic - ( 2019 23:27 )    Color: Yellow / Appearance: Slightly Turbid / S.015 / pH: x  Gluc: x / Ketone: Negative  / Bili: Negative / Urobili: Negative   Blood: x / Protein: 100 / Nitrite: Negative   Leuk Esterase: Large / RBC: 13 /hpf /  /HPF   Sq Epi: x / Non Sq Epi: 0 / Bacteria: Moderate    LABS:      139  |  99  |  36<H>  ----------------------------<  131<H>  3.8   |  26  |  0.56    Ca    8.9      2019 10:30    TPro  5.6<L>  /  Alb  2.8<L>  /  TBili  0.3  /  DBili  x   /  AST  9<L>  /  ALT  8<L>  /  AlkPhos  72                            9.9    16.6  )-----------( 692      ( 2019 09:13 )             29.4     PT/INR - ( 2019 09:13 )   PT: 16.2 sec;   INR: 1.39 ratio         PTT - ( 2019 09:13 )  PTT:30.5 sec

## 2019-07-13 LAB
ALBUMIN SERPL ELPH-MCNC: 1.4 G/DL — LOW (ref 3.3–5)
ALP SERPL-CCNC: 78 U/L — SIGNIFICANT CHANGE UP (ref 40–120)
ALT FLD-CCNC: 8 U/L — LOW (ref 10–45)
ANION GAP SERPL CALC-SCNC: 13 MMOL/L — SIGNIFICANT CHANGE UP (ref 5–17)
ANION GAP SERPL CALC-SCNC: 18 MMOL/L — HIGH (ref 5–17)
APPEARANCE UR: ABNORMAL
APTT BLD: 38.7 SEC — HIGH (ref 27.5–36.3)
AST SERPL-CCNC: 14 U/L — SIGNIFICANT CHANGE UP (ref 10–40)
BACTERIA # UR AUTO: ABNORMAL
BILIRUB SERPL-MCNC: 0.1 MG/DL — LOW (ref 0.2–1.2)
BILIRUB UR-MCNC: NEGATIVE — SIGNIFICANT CHANGE UP
BUN SERPL-MCNC: 92 MG/DL — HIGH (ref 7–23)
BUN SERPL-MCNC: 92 MG/DL — HIGH (ref 7–23)
CALCIUM SERPL-MCNC: 10.3 MG/DL — SIGNIFICANT CHANGE UP (ref 8.4–10.5)
CALCIUM SERPL-MCNC: 9.9 MG/DL — SIGNIFICANT CHANGE UP (ref 8.4–10.5)
CHLORIDE SERPL-SCNC: 94 MMOL/L — LOW (ref 96–108)
CHLORIDE SERPL-SCNC: 95 MMOL/L — LOW (ref 96–108)
CK MB CFR SERPL CALC: 11.1 NG/ML — HIGH (ref 0–3.8)
CK SERPL-CCNC: 80 U/L — SIGNIFICANT CHANGE UP (ref 25–170)
CO2 SERPL-SCNC: 21 MMOL/L — LOW (ref 22–31)
CO2 SERPL-SCNC: 21 MMOL/L — LOW (ref 22–31)
COLOR SPEC: YELLOW — SIGNIFICANT CHANGE UP
CREAT SERPL-MCNC: 2.31 MG/DL — HIGH (ref 0.5–1.3)
CREAT SERPL-MCNC: 2.4 MG/DL — HIGH (ref 0.5–1.3)
CRP SERPL-MCNC: 14.01 MG/DL — HIGH (ref 0–0.4)
DIFF PNL FLD: ABNORMAL
EPI CELLS # UR: 0 — SIGNIFICANT CHANGE UP
ERYTHROCYTE [SEDIMENTATION RATE] IN BLOOD: 90 MM/HR — HIGH (ref 0–20)
GAS PNL BLDA: SIGNIFICANT CHANGE UP
GAS PNL BLDA: SIGNIFICANT CHANGE UP
GLUCOSE BLDC GLUCOMTR-MCNC: 117 MG/DL — HIGH (ref 70–99)
GLUCOSE BLDC GLUCOMTR-MCNC: 154 MG/DL — HIGH (ref 70–99)
GLUCOSE BLDC GLUCOMTR-MCNC: 172 MG/DL — HIGH (ref 70–99)
GLUCOSE BLDC GLUCOMTR-MCNC: 201 MG/DL — HIGH (ref 70–99)
GLUCOSE SERPL-MCNC: 119 MG/DL — HIGH (ref 70–99)
GLUCOSE SERPL-MCNC: 134 MG/DL — HIGH (ref 70–99)
GLUCOSE UR QL: NEGATIVE — SIGNIFICANT CHANGE UP
HCT VFR BLD CALC: 23.8 % — LOW (ref 34.5–45)
HCT VFR BLD CALC: 28.4 % — LOW (ref 34.5–45)
HGB BLD-MCNC: 8 G/DL — LOW (ref 11.5–15.5)
HGB BLD-MCNC: 8.8 G/DL — LOW (ref 11.5–15.5)
INR BLD: 1.19 RATIO — HIGH (ref 0.88–1.16)
KETONES UR-MCNC: NEGATIVE — SIGNIFICANT CHANGE UP
LEUKOCYTE ESTERASE UR-ACNC: ABNORMAL
MAGNESIUM SERPL-MCNC: 2 MG/DL — SIGNIFICANT CHANGE UP (ref 1.6–2.6)
MCHC RBC-ENTMCNC: 28.7 PG — SIGNIFICANT CHANGE UP (ref 27–34)
MCHC RBC-ENTMCNC: 30.1 PG — SIGNIFICANT CHANGE UP (ref 27–34)
MCHC RBC-ENTMCNC: 31 GM/DL — LOW (ref 32–36)
MCHC RBC-ENTMCNC: 33.5 GM/DL — SIGNIFICANT CHANGE UP (ref 32–36)
MCV RBC AUTO: 90 FL — SIGNIFICANT CHANGE UP (ref 80–100)
MCV RBC AUTO: 92.5 FL — SIGNIFICANT CHANGE UP (ref 80–100)
NITRITE UR-MCNC: NEGATIVE — SIGNIFICANT CHANGE UP
NT-PROBNP SERPL-SCNC: HIGH PG/ML (ref 0–300)
OSMOLALITY SERPL: 309 MOSMOL/KG — HIGH (ref 280–301)
OSMOLALITY UR: 269 MOS/KG — LOW (ref 300–900)
PH UR: 6 — SIGNIFICANT CHANGE UP (ref 5–8)
PHOSPHATE SERPL-MCNC: 4.1 MG/DL — SIGNIFICANT CHANGE UP (ref 2.5–4.5)
PLATELET # BLD AUTO: 392 K/UL — SIGNIFICANT CHANGE UP (ref 150–400)
PLATELET # BLD AUTO: 432 K/UL — HIGH (ref 150–400)
POTASSIUM SERPL-MCNC: 3.7 MMOL/L — SIGNIFICANT CHANGE UP (ref 3.5–5.3)
POTASSIUM SERPL-MCNC: 4.1 MMOL/L — SIGNIFICANT CHANGE UP (ref 3.5–5.3)
POTASSIUM SERPL-SCNC: 3.7 MMOL/L — SIGNIFICANT CHANGE UP (ref 3.5–5.3)
POTASSIUM SERPL-SCNC: 4.1 MMOL/L — SIGNIFICANT CHANGE UP (ref 3.5–5.3)
PROT SERPL-MCNC: 4.2 G/DL — LOW (ref 6–8.3)
PROT UR-MCNC: ABNORMAL
PROTHROM AB SERPL-ACNC: 13.6 SEC — HIGH (ref 10–12.9)
RBC # BLD: 2.64 M/UL — LOW (ref 3.8–5.2)
RBC # BLD: 3.07 M/UL — LOW (ref 3.8–5.2)
RBC # FLD: 13.9 % — SIGNIFICANT CHANGE UP (ref 10.3–14.5)
RBC # FLD: 15 % — HIGH (ref 10.3–14.5)
RBC CASTS # UR COMP ASSIST: SIGNIFICANT CHANGE UP /HPF (ref 0–4)
SODIUM SERPL-SCNC: 129 MMOL/L — LOW (ref 135–145)
SODIUM SERPL-SCNC: 133 MMOL/L — LOW (ref 135–145)
SODIUM UR-SCNC: 41 MMOL/L — SIGNIFICANT CHANGE UP
SP GR SPEC: 1.02 — SIGNIFICANT CHANGE UP (ref 1.01–1.02)
TROPONIN T, HIGH SENSITIVITY RESULT: 452 NG/L — HIGH (ref 0–51)
TROPONIN T, HIGH SENSITIVITY RESULT: 478 NG/L — HIGH (ref 0–51)
TSH SERPL-MCNC: 9.23 UIU/ML — HIGH (ref 0.27–4.2)
UROBILINOGEN FLD QL: NEGATIVE — SIGNIFICANT CHANGE UP
VANCOMYCIN FLD-MCNC: 20.3 UG/ML — SIGNIFICANT CHANGE UP
WBC # BLD: 15.1 K/UL — HIGH (ref 3.8–10.5)
WBC # BLD: 24.39 K/UL — HIGH (ref 3.8–10.5)
WBC # FLD AUTO: 15.1 K/UL — HIGH (ref 3.8–10.5)
WBC # FLD AUTO: 24.39 K/UL — HIGH (ref 3.8–10.5)
WBC UR QL: >50 /HPF — HIGH (ref 0–5)

## 2019-07-13 PROCEDURE — 99233 SBSQ HOSP IP/OBS HIGH 50: CPT | Mod: GC

## 2019-07-13 PROCEDURE — 71045 X-RAY EXAM CHEST 1 VIEW: CPT | Mod: 26

## 2019-07-13 RX ORDER — HEPARIN SODIUM 5000 [USP'U]/ML
INJECTION INTRAVENOUS; SUBCUTANEOUS
Qty: 25000 | Refills: 0 | Status: DISCONTINUED | OUTPATIENT
Start: 2019-07-13 | End: 2019-07-17

## 2019-07-13 RX ORDER — CEFEPIME 1 G/1
500 INJECTION, POWDER, FOR SOLUTION INTRAMUSCULAR; INTRAVENOUS ONCE
Refills: 0 | Status: DISCONTINUED | OUTPATIENT
Start: 2019-07-13 | End: 2019-07-13

## 2019-07-13 RX ORDER — HEPARIN SODIUM 5000 [USP'U]/ML
5000 INJECTION INTRAVENOUS; SUBCUTANEOUS EVERY 8 HOURS
Refills: 0 | Status: DISCONTINUED | OUTPATIENT
Start: 2019-07-13 | End: 2019-07-13

## 2019-07-13 RX ORDER — LEVOTHYROXINE SODIUM 125 MCG
60 TABLET ORAL AT BEDTIME
Refills: 0 | Status: DISCONTINUED | OUTPATIENT
Start: 2019-07-13 | End: 2019-08-07

## 2019-07-13 RX ORDER — SODIUM CHLORIDE 9 MG/ML
500 INJECTION, SOLUTION INTRAVENOUS ONCE
Refills: 0 | Status: COMPLETED | OUTPATIENT
Start: 2019-07-13 | End: 2019-07-13

## 2019-07-13 RX ORDER — NOREPINEPHRINE BITARTRATE/D5W 8 MG/250ML
0.01 PLASTIC BAG, INJECTION (ML) INTRAVENOUS
Qty: 8 | Refills: 0 | Status: DISCONTINUED | OUTPATIENT
Start: 2019-07-13 | End: 2019-07-15

## 2019-07-13 RX ORDER — MEROPENEM 1 G/30ML
500 INJECTION INTRAVENOUS EVERY 12 HOURS
Refills: 0 | Status: DISCONTINUED | OUTPATIENT
Start: 2019-07-13 | End: 2019-07-18

## 2019-07-13 RX ORDER — CHLORHEXIDINE GLUCONATE 213 G/1000ML
1 SOLUTION TOPICAL
Refills: 0 | Status: DISCONTINUED | OUTPATIENT
Start: 2019-07-13 | End: 2019-07-15

## 2019-07-13 RX ORDER — HEPARIN SODIUM 5000 [USP'U]/ML
2000 INJECTION INTRAVENOUS; SUBCUTANEOUS EVERY 6 HOURS
Refills: 0 | Status: DISCONTINUED | OUTPATIENT
Start: 2019-07-13 | End: 2019-07-17

## 2019-07-13 RX ORDER — HYDROCORTISONE 20 MG
50 TABLET ORAL EVERY 8 HOURS
Refills: 0 | Status: DISCONTINUED | OUTPATIENT
Start: 2019-07-13 | End: 2019-07-13

## 2019-07-13 RX ORDER — MIDODRINE HYDROCHLORIDE 2.5 MG/1
2.5 TABLET ORAL ONCE
Refills: 0 | Status: COMPLETED | OUTPATIENT
Start: 2019-07-13 | End: 2019-07-13

## 2019-07-13 RX ORDER — HYDROCORTISONE 20 MG
150 TABLET ORAL ONCE
Refills: 0 | Status: COMPLETED | OUTPATIENT
Start: 2019-07-13 | End: 2019-07-13

## 2019-07-13 RX ORDER — HEPARIN SODIUM 5000 [USP'U]/ML
4000 INJECTION INTRAVENOUS; SUBCUTANEOUS EVERY 6 HOURS
Refills: 0 | Status: DISCONTINUED | OUTPATIENT
Start: 2019-07-13 | End: 2019-07-17

## 2019-07-13 RX ORDER — MIDODRINE HYDROCHLORIDE 2.5 MG/1
5 TABLET ORAL ONCE
Refills: 0 | Status: DISCONTINUED | OUTPATIENT
Start: 2019-07-13 | End: 2019-07-13

## 2019-07-13 RX ORDER — FORMOTEROL FUMARATE 12 MCG
20 CAPSULE, WITH INHALATION DEVICE INHALATION
Refills: 0 | Status: DISCONTINUED | OUTPATIENT
Start: 2019-07-13 | End: 2019-08-07

## 2019-07-13 RX ORDER — HYDROCORTISONE 20 MG
50 TABLET ORAL EVERY 6 HOURS
Refills: 0 | Status: DISCONTINUED | OUTPATIENT
Start: 2019-07-13 | End: 2019-07-14

## 2019-07-13 RX ORDER — MIDODRINE HYDROCHLORIDE 2.5 MG/1
2.5 TABLET ORAL ONCE
Refills: 0 | Status: DISCONTINUED | OUTPATIENT
Start: 2019-07-13 | End: 2019-07-13

## 2019-07-13 RX ADMIN — Medication 15 MILLILITER(S): at 11:07

## 2019-07-13 RX ADMIN — Medication 2: at 12:09

## 2019-07-13 RX ADMIN — MIDODRINE HYDROCHLORIDE 2.5 MILLIGRAM(S): 2.5 TABLET ORAL at 00:50

## 2019-07-13 RX ADMIN — Medication 1 APPLICATION(S): at 17:23

## 2019-07-13 RX ADMIN — HEPARIN SODIUM 5000 UNIT(S): 5000 INJECTION INTRAVENOUS; SUBCUTANEOUS at 05:59

## 2019-07-13 RX ADMIN — SODIUM CHLORIDE 500 MILLILITER(S): 9 INJECTION, SOLUTION INTRAVENOUS at 00:32

## 2019-07-13 RX ADMIN — Medication 0.67 MICROGRAM(S)/KG/MIN: at 05:59

## 2019-07-13 RX ADMIN — Medication 1 DROP(S): at 12:09

## 2019-07-13 RX ADMIN — CHLORHEXIDINE GLUCONATE 1 APPLICATION(S): 213 SOLUTION TOPICAL at 17:25

## 2019-07-13 RX ADMIN — Medication 1: at 17:24

## 2019-07-13 RX ADMIN — Medication 50 MILLIGRAM(S): at 05:59

## 2019-07-13 RX ADMIN — Medication 1 DROP(S): at 17:24

## 2019-07-13 RX ADMIN — Medication 0.67 MICROGRAM(S)/KG/MIN: at 08:52

## 2019-07-13 RX ADMIN — Medication 50 MILLIGRAM(S): at 17:23

## 2019-07-13 RX ADMIN — FAMOTIDINE 20 MILLIGRAM(S): 10 INJECTION INTRAVENOUS at 11:07

## 2019-07-13 RX ADMIN — Medication 1 APPLICATION(S): at 05:59

## 2019-07-13 RX ADMIN — Medication 50 MILLIGRAM(S): at 11:07

## 2019-07-13 RX ADMIN — MEROPENEM 100 MILLIGRAM(S): 1 INJECTION INTRAVENOUS at 05:59

## 2019-07-13 RX ADMIN — ZINC SULFATE TAB 220 MG (50 MG ZINC EQUIVALENT) 220 MILLIGRAM(S): 220 (50 ZN) TAB at 11:44

## 2019-07-13 RX ADMIN — Medication 150 MILLIGRAM(S): at 02:27

## 2019-07-13 RX ADMIN — Medication 300 MILLIGRAM(S): at 11:07

## 2019-07-13 RX ADMIN — CHLORHEXIDINE GLUCONATE 1 APPLICATION(S): 213 SOLUTION TOPICAL at 06:12

## 2019-07-13 RX ADMIN — Medication 1: at 00:32

## 2019-07-13 RX ADMIN — Medication 50 MILLIGRAM(S): at 23:34

## 2019-07-13 RX ADMIN — MEROPENEM 100 MILLIGRAM(S): 1 INJECTION INTRAVENOUS at 17:24

## 2019-07-13 RX ADMIN — Medication 500 MILLIGRAM(S): at 11:07

## 2019-07-13 RX ADMIN — Medication 60 MICROGRAM(S): at 23:33

## 2019-07-13 RX ADMIN — Medication 1 APPLICATION(S): at 07:01

## 2019-07-13 RX ADMIN — HEPARIN SODIUM 1000 UNIT(S)/HR: 5000 INJECTION INTRAVENOUS; SUBCUTANEOUS at 22:12

## 2019-07-13 NOTE — RAPID RESPONSE TEAM SUMMARY - NSSITUATIONBACKGROUNDRRT_GEN_ALL_CORE
70 y/o F w/ PMH of severe dementia and bedbound (for >1 yr), asthma on chronic steroids, CVA, HLD, 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 PEG, RLE DVT, multiple decub ulcers, UTI, Asp PNA and initially readmitted for dislodged PEG and now w/ sepsis due to aspiration PNA and UTI again c/b TRANG and hyperkalemia. Concerns of Vancomycin that was given causing rise in TRANG and was being held along w/ supratherapeutic levels. Furthermore, her other antibiotics cefepime and flagyl were also being held to help with the Cr. A RRT was called for hypotension in the sBP 50-60s and hypoxia in the 80s. Pt had no mental status and nonverbal, no response to painful stimuli and  at bedside concerned this was not her baseline. However, as per the records she has been nonverbal and functional quadriplegic and  wanted her full code after multiple talks. After another 1-1.5 L of LR and an additional 5mg of midodrine were given, patient's sBP was still in the 70s. Labs were drawn, CXR ordered, cefepime added back on and MICU was called. B-lines seen on POCUS b/l and it was decided to start Levophed and bring her to the MICU.

## 2019-07-13 NOTE — CHART NOTE - NSCHARTNOTEFT_GEN_A_CORE
Notified by RN patient with temperature of 101.6  F. Patient seen and examined  by me at bedside.   Pt's  at a bedside,     ROS:  unable to obtain, pt non- verbarl           VITAL SIGNS:  T(C): 38.7 (19 @ 23:14), Max: 38.7 (19 @ 23:14)  HR: 135 (19 @ 21:15) (99 - 135)  BP: 111/61 (19 @ 21:15) (90/50 - 111/70)  RR: 18 (19 @ 21:15) (18 - 18)  SpO2: 93% (19 @ 21:15) (93% - 95%)  Wt(kg): --      Physical Exam:  CV: S1S2, RRR  Lungs: effort normal, clear anteriorly and right posterior. Decreased to left base. No rales , no wheezes.  Abd: BS(+), soft , NT/ND. PEG tube intact,   Ext: 1-2+ edema BLE. LUE with trace to 1+ edema.  Skin: (+) warm, dry         LABORATORY:                        9.0    13.2  )-----------( 400      ( 2019 16:37 )             26.9       07-12    133<L>  |  96  |  92<H>  ----------------------------<  118<H>  3.9   |  21<L>  |  2.32<H>    Ca    10.6<H>      2019 09:13        Urinalysis Basic - ( 2019 23:27 )    Color: Yellow / Appearance: Slightly Turbid / S.015 / pH: x  Gluc: x / Ketone: Negative  / Bili: Negative / Urobili: Negative   Blood: x / Protein: 100 / Nitrite: Negative   Leuk Esterase: Large / RBC: 13 /hpf /  /HPF   Sq Epi: x / Non Sq Epi: 0 / Bacteria: Moderate            ASSESSMENT/PLAN:   HPI:  69 year old female with multiple prolonged hospitalizations, PMH of Advanced dementia (nonverbal, dysphagia, with PEG tube, functional quadriplegic, chronic Blackman catheter) sacral state 4 pressure ulcer, heel pressure ulcers, asthma on chronic prednisone, HLD, CVA, severe lordosis/kyphoscoliosis, 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 Patient seen and examined  by me at bedside.   Pt's  at a bedside,     ROS:  unable to obtain, pt non- verbarl           VITAL SIGNS:  T(C): 38.7 (19 @ 23:14), Max: 38.7 (19 @ 23:14)  HR: 135 (19 @ 21:15) (99 - 135)  BP: 111/61 (19 @ 21:15) (90/50 - 111/70)  RR: 18 (19 @ 21:15) (18 - 18)  SpO2: 93% (19 @ 21:15) (93% - 95%)  Wt(kg): --      Physical Exam:  CV: S1S2, RRR  Lungs: effort normal, clear anteriorly and right posterior. Decreased to left base. No rales , no wheezes.  Abd: BS(+), soft , NT/ND. PEG tube intact,   Ext: 1-2+ edema BLE. LUE with trace to 1+ edema.  Skin: (+) warm, dry         LABORATORY:                        9.0    13.2  )-----------( 400      ( 2019 16:37 )             26.9       07-12    133<L>  |  96  |  92<H>  ----------------------------<  118<H>  3.9   |  21<L>  |  2.32<H>    Ca    10.6<H>      2019 09:13        Urinalysis Basic - ( 2019 23:27 )    Color: Yellow / Appearance: Slightly Turbid / S.015 / pH: x  Gluc: x / Ketone: Negative  / Bili: Negative / Urobili: Negative   Blood: x / Protein: 100 / Nitrite: Negative   Leuk Esterase: Large / RBC: 13 /hpf /  /HPF   Sq Epi: x / Non Sq Epi: 0 / Bacteria: Moderate            ASSESSMENT/PLAN:   HPI:  69 year old female with multiple prolonged hospitalizations, PMH of Advanced dementia (nonverbal, dysphagia, with PEG tube, functional quadriplegic, chronic Blackman catheter) sacral state 4 pressure ulcer, heel pressure ulcers, asthma on chronic prednisone, HLD, CVA, severe lordosis/kyphoscoliosis, 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 Patient seen and examined  by me at bedside.   Pt's  at a bedside,     ROS:  unable to obtain, pt non- verbarl           VITAL SIGNS:  T(C): 38.7 (19 @ 23:14), Max: 38.7 (19 @ 23:14)  HR: 135 (19 @ 21:15) (99 - 135)  BP: 111/61 (19 @ 21:15) (90/50 - 111/70)  RR: 18 (19 @ 21:15) (18 - 18)  SpO2: 93% (19 @ 21:15) (93% - 95%)  Wt(kg): --      Physical Exam:  CV: S1S2, RRR  Lungs: effort normal, clear anteriorly and right posterior. Decreased to left base. No rales , no wheezes.  Abd: BS(+), soft , NT/ND. PEG tube intact,   Ext: 1-2+ edema BLE. LUE with trace to 1+ edema.  Skin: (+) warm, dry         LABORATORY:                        9.0    13.2  )-----------( 400      ( 2019 16:37 )             26.9       07-12    133<L>  |  96  |  92<H>  ----------------------------<  118<H>  3.9   |  21<L>  |  2.32<H>    Ca    10.6<H>      2019 09:13        Urinalysis Basic - ( 2019 23:27 )    Color: Yellow / Appearance: Slightly Turbid / S.015 / pH: x  Gluc: x / Ketone: Negative  / Bili: Negative / Urobili: Negative   Blood: x / Protein: 100 / Nitrite: Negative   Leuk Esterase: Large / RBC: 13 /hpf /  /HPF   Sq Epi: x / Non Sq Epi: 0 / Bacteria: Moderate            ASSESSMENT/PLAN:   HPI:  69 year old female with multiple prolonged hospitalizations, PMH of Advanced dementia (nonverbal, dysphagia, with PEG tube, functional quadriplegic, chronic Blackman catheter) sacral state 4 pressure ulcer, heel pressure ulcers, asthma on chronic prednisone, HLD, CVA, severe lordosis/kyphoscoliosis, 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     Repeat BP sBP 50-60s and hypoxia in the 80s. RRT called,   Pt received additional  1 L of LR with no significant improvement, s/p 5 additional Midodrine with no significant improvement   MICU consulted pt started on pressors.   -     -Will endorse to primary team in AM Notified by RN patient with temperature of 101.6  F. Patient seen and examined  by me at bedside.   Pt's  at a bedside,     ROS:  unable to obtain, pt non- verbarl           VITAL SIGNS:  T(C): 38.7 (19 @ 23:14), Max: 38.7 (19 @ 23:14)  HR: 135 (19 @ 21:15) (99 - 135)  BP: 111/61 (19 @ 21:15) (90/50 - 111/70)  RR: 18 (19 @ 21:15) (18 - 18)  SpO2: 93% (19 @ 21:15) (93% - 95%)  Wt(kg): --      Physical Exam:  CV: S1S2, RRR  Lungs: effort normal on 2l NC,  Decreased to left base. No rales , no wheezes.  Abd: BS(+), soft , NT/ND. PEG tube intact,   Ext: 1-2+ edema BLE. LUE with trace to 1+ edema.  Skin: (+) warm, dry         LABORATORY:                        9.0    13.2  )-----------( 400      ( 2019 16:37 )             26.9       07-12    133<L>  |  96  |  92<H>  ----------------------------<  118<H>  3.9   |  21<L>  |  2.32<H>    Ca    10.6<H>      2019 09:13        Urinalysis Basic - ( 2019 23:27 )    Color: Yellow / Appearance: Slightly Turbid / S.015 / pH: x  Gluc: x / Ketone: Negative  / Bili: Negative / Urobili: Negative   Blood: x / Protein: 100 / Nitrite: Negative   Leuk Esterase: Large / RBC: 13 /hpf /  /HPF   Sq Epi: x / Non Sq Epi: 0 / Bacteria: Moderate            ASSESSMENT/PLAN:   HPI:  69 year old female with multiple prolonged hospitalizations, PMH of Advanced dementia (nonverbal, dysphagia, with PEG tube, functional quadriplegic, chronic Blackman catheter) sacral state 4 pressure ulcer, heel pressure ulcers, asthma on chronic prednisone, HLD, CVA, severe lordosis/kyphoscoliosis, 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     Repeat BP sBP 50-60s and hypoxia in the 80s. RRT called,   Pt received additional  1 L of LR with no significant improvement, s/p 5 additional Midodrine with no significant improvement   MICU consulted pt started on pressors.   -     -Will endorse to primary team in AM Notified by RN patient with temperature of 101.6  F. Patient seen and examined  by me at bedside.   Pt's  at a bedside,     ROS:  unable to obtain, pt non- verbarl           VITAL SIGNS:  T(C): 38.7 (19 @ 23:14), Max: 38.7 (19 @ 23:14)  HR: 135 (19 @ 21:15) (99 - 135)  BP: 111/61 (19 @ 21:15) (90/50 - 111/70)  RR: 18 (19 @ 21:15) (18 - 18)  SpO2: 93% (19 @ 21:15) (93% - 95%)  Wt(kg): --      Physical Exam:  CV: S1S2, RRR  Lungs: effort normal on 2l NC,  Decreased to left base. No rales , no wheezes.  Abd: BS(+), soft , NT/ND. PEG tube intact,   Ext: 1-2+ edema BLE. LUE with trace to 1+ edema.  Skin: (+) warm, dry         LABORATORY:                        9.0    13.2  )-----------( 400      ( 2019 16:37 )             26.9       07-12    133<L>  |  96  |  92<H>  ----------------------------<  118<H>  3.9   |  21<L>  |  2.32<H>    Ca    10.6<H>      2019 09:13        Urinalysis Basic - ( 2019 23:27 )    Color: Yellow / Appearance: Slightly Turbid / S.015 / pH: x  Gluc: x / Ketone: Negative  / Bili: Negative / Urobili: Negative   Blood: x / Protein: 100 / Nitrite: Negative   Leuk Esterase: Large / RBC: 13 /hpf /  /HPF   Sq Epi: x / Non Sq Epi: 0 / Bacteria: Moderate            ASSESSMENT/PLAN:   HPI:  69 year old female with multiple prolonged hospitalizations, PMH of Advanced dementia (nonverbal, dysphagia, with PEG tube, functional quadriplegic, chronic Blackman catheter) sacral state 4 pressure ulcer, heel pressure ulcers, asthma on chronic prednisone, HLD, CVA, severe lordosis/kyphoscoliosis, 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     Repeat BP sBP 50-60s and hypoxia in the 80s. RRT called,   Pt received additional  1 L of LR with no significant improvement, s/p 5 additional Midodrine with no significant improvement   MICU consulted pt started on pressors.   - Dr. Bolden made aware.

## 2019-07-13 NOTE — PROGRESS NOTE ADULT - SUBJECTIVE AND OBJECTIVE BOX
---___---___---___---___---___---___ ---___---___---___---___---___---___---___---___---                  M E D I C A L   A T T E N D I N G   P R O G R E S S   N O T E  ---___---___---___---___---___---___ ---___---___---___---___---___---___---___---___---        ================================================    ++CHIEF COMPLAINT:   Patient is a 69y old  Female who presents with a chief complaint of PEG tube dislodgement, fever, leukocytosis (2019 05:02)    acute sepsis and hypotension and now in icu. on pressors and broad spectrum, abx   ---___---___---___---___---___---  PAST MEDICAL / Surgical  HISTORY:  PAST MEDICAL & SURGICAL HISTORY:  Type 2 diabetes mellitus  Dementia  DVT, lower extremity  CVA (cerebral vascular accident)  Fatty pancreas  PNA (pneumonia)  Pulmonary HTN  IGT (impaired glucose tolerance)  Ulcerative colitis  Acid reflux  Anxiety  Depression  Mouth sores  HLD (hyperlipidemia)  Asthma  S/P percutaneous endoscopic gastrostomy (PEG) tube placement: for dysphagia  Humeral head fracture  H/O: hysterectomy  H/O cataract extraction, left  History of knee replacement      ---___---___---___---___---___---  FAMILY HISTORY:   FAMILY HISTORY:  Family history of dementia (Grandparent)  Family history of colon cancer (Grandparent)        ---___---___---___---___---___---  ALLERGIES:   Allergies    ASA; dye contrast (Anaphylaxis)  aspirin (Short breath)  divalproex sodium (Other (Unknown))  Flowers and Plants (Short breath)  Haldol (Other (Unknown))  penicillin (Short breath; Rash)  sulfa drugs (Short breath; Rash)  vancomycin (Rash; Urticaria; Hives)  Xanax (Other (Unknown))    Intolerances        ---___---___---___---___---___---  MEDICATIONS:  MEDICATIONS  (STANDING):  artificial tears (preservative free) Ophthalmic Solution 1 Drop(s) Both EYES four times a day  ascorbic acid 500 milliGRAM(s) Oral daily  BACItracin   Ointment 1 Application(s) Topical two times a day  buDESOnide  80 MICROgram(s)/formoterol 4.5 MICROgram(s) Inhaler 2 Puff(s) Inhalation two times a day  chlorhexidine 2% Cloths 1 Application(s) Topical daily  chlorhexidine 4% Liquid 1 Application(s) Topical <User Schedule>  Dakins Solution - 1/4 Strength 1 Application(s) Topical two times a day  dextrose 5%. 1000 milliLiter(s) (50 mL/Hr) IV Continuous <Continuous>  dextrose 50% Injectable 12.5 Gram(s) IV Push once  dextrose 50% Injectable 25 Gram(s) IV Push once  dextrose 50% Injectable 25 Gram(s) IV Push once  famotidine    Tablet 20 milliGRAM(s) Oral daily  ferrous    sulfate Liquid 300 milliGRAM(s) Enteral Tube daily  heparin  Infusion.  Unit(s)/Hr (10 mL/Hr) IV Continuous <Continuous>  hydrocortisone sodium succinate Injectable 50 milliGRAM(s) IV Push every 6 hours  insulin lispro (HumaLOG) corrective regimen sliding scale   SubCutaneous every 6 hours  meropenem  IVPB 500 milliGRAM(s) IV Intermittent every 12 hours  multivitamin/minerals/iron Oral Solution (CENTRUM) 15 milliLiter(s) Oral daily  norepinephrine Infusion 0.01 MICROgram(s)/kG/Min (0.673 mL/Hr) IV Continuous <Continuous>  zinc sulfate 220 milliGRAM(s) Oral daily    MEDICATIONS  (PRN):  acetaminophen  Suppository .. 650 milliGRAM(s) Rectal every 6 hours PRN Temp greater or equal to 38C (100.4F), Mild Pain (1 - 3)  carboxymethylcellulose 0.5% (Preservative-Free) Ophthalmic Solution 1 Drop(s) Both EYES three times a day PRN dry eyes  dextrose 40% Gel 15 Gram(s) Oral once PRN Blood Glucose LESS THAN 70 milliGRAM(s)/deciliter  glucagon  Injectable 1 milliGRAM(s) IntraMuscular once PRN Glucose LESS THAN 70 milligrams/deciliter  heparin  Injectable 4000 Unit(s) IV Push every 6 hours PRN For aPTT less than 40  heparin  Injectable 2000 Unit(s) IV Push every 6 hours PRN For aPTT between 40 - 57  nystatin Powder 1 Application(s) Topical three times a day PRN under breasts      ---___---___---___---___---___---  REVIEW OF SYSTEM:    unable to obtain     ---___---___---___---___---___---  VITAL SIGNS:  69y , CAPILLARY BLOOD GLUCOSE      POCT Blood Glucose.: 201 mg/dL (2019 11:51)    T(C): 35.9 (19 @ 12:00), Max: 38.7 (19 @ 23:14)  HR: 111 (19 @ 12:08) (98 - 135)  BP: 122/62 (19 @ 12:00) (67/39 - 145/67)  RR: 18 (19 @ 12:08) (3 - 79)  SpO2: 99% (19 @ 12:08) (87% - 100%)  ---___---___---___---___---___---  PHYSICAL EXAM:    GEN: A&O X 0, NAD , tacypneic  HEENT: NCAT, PERRL, MMM, hearing intact  Neck: supple , no JVD  CVS: S1S2 , regular , No M/R/G appreciated  PULM: decreased breath sounds noted   ABD.: soft. non tender, non distended,  bowel sounds present  Extrem: intact pulses , no edema   Derm: mstage 4 sacral dcubitus  PSYCH : normal mood,  no delusion not anxious     ---___---___---___---___---___---            LAB AND IMAGIN.8    24.39 )-----------( 432      ( 2019 10:58 )             28.4               07-13    133<L>  |  94<L>  |  92<H>  ----------------------------<  134<H>  4.1   |  21<L>  |  2.40<H>    Ca    10.3      2019 05:37  Phos  4.1     -  Mg     2.0         TPro  4.2<L>  /  Alb  1.4<L>  /  TBili  0.1<L>  /  DBili  x   /  AST  14  /  ALT  8<L>  /  AlkPhos  78  07-13    PT/INR - ( 2019 09:58 )   PT: 13.6 sec;   INR: 1.19 ratio         PTT - ( 2019 09:58 )  PTT:38.7 sec            CARDIAC MARKERS ( 2019 05:37 )  x     / x     / 80 U/L / x     / 11.1 ng/mL            ABG - ( 2019 09:51 )  pH, Arterial: 7.39  pH, Blood: x     /  pCO2: 35    /  pO2: 130   / HCO3: 20    / Base Excess: -3.7  /  SaO2: 99                Urinalysis Basic - ( 2019 05:37 )    Color: Yellow / Appearance: Slightly Turbid / S.016 / pH: x  Gluc: x / Ketone: Negative  / Bili: Negative / Urobili: Negative   Blood: x / Protein: 300 mg/dL / Nitrite: Negative   Leuk Esterase: Large / RBC: 10-25 /hpf / WBC >50 /HPF   Sq Epi: x / Non Sq Epi: 0 / Bacteria: Moderate        [All pertinent / recent Imaging reviewed]         ---___---___---___---___---___---___ ---___---___---___---___---                         A S S E S S M E N T   A N D   P L A N :      HEALTH ISSUES - PROBLEM Dx:  sepstic shock   TRANG trending higher   on pressors and merrem   monitor cbc and cmp   o2 sat low on high flow   dementia continue supportive care   h/o chf monitor labs  as per primary team,   dm2 on insulin lispro;  -GI/DVT Prophylaxis. continue heparin     prognosis poor  --------------------------------------------  Case discussed with    Education given on   ___________________________  Thank you,  Deniz Bolden  [phone]

## 2019-07-13 NOTE — PROGRESS NOTE ADULT - SUBJECTIVE AND OBJECTIVE BOX
CHIEF COMPLAINT:    HPI:    PAST MEDICAL & SURGICAL HISTORY:  Type 2 diabetes mellitus  Dementia  DVT, lower extremity  CVA (cerebral vascular accident)  Fatty pancreas  PNA (pneumonia)  Pulmonary HTN  IGT (impaired glucose tolerance)  Ulcerative colitis  Acid reflux  Anxiety  Depression  Mouth sores  HLD (hyperlipidemia)  Asthma  S/P percutaneous endoscopic gastrostomy (PEG) tube placement: for dysphagia  Humeral head fracture  H/O: hysterectomy  H/O cataract extraction, left  History of knee replacement      FAMILY HISTORY:  Family history of dementia (Grandparent)  Family history of colon cancer (Grandparent)      SOCIAL HISTORY:  Smoking: [ ] Never Smoked [ ] Former Smoker (__ packs x ___ years) [ ] Current Smoker  (__ packs x ___ years)  Substance Use: [ ] Never Used [ ] Used ____  EtOH Use:  Marital Status: [ ] Single [ ]  [ ]  [ ]   Sexual History:   Occupation:  Recent Travel:  Country of Birth:  Advance Directives:    Allergies    ASA; dye contrast (Anaphylaxis)  aspirin (Short breath)  divalproex sodium (Other (Unknown))  Flowers and Plants (Short breath)  Haldol (Other (Unknown))  penicillin (Short breath; Rash)  sulfa drugs (Short breath; Rash)  vancomycin (Rash; Urticaria; Hives)  Xanax (Other (Unknown))    Intolerances        HOME MEDICATIONS:    REVIEW OF SYSTEMS:  Constitutional: [ ] fevers [ ] chills [ ] weight loss [ ] weight gain  HEENT: [ ] dry eyes [ ] eye irritation [ ] postnasal drip [ ] nasal congestion  CV: [ ] chest pain [ ] orthopnea [ ] palpitations [ ] murmur  Resp: [ ] cough [ ] shortness of breath [ ] dyspnea [ ] wheezing [ ] sputum [ ] hemoptysis  GI: [ ] nausea [ ] vomiting [ ] diarrhea [ ] constipation [ ] abd pain [ ] dysphagia   : [ ] dysuria [ ] nocturia [ ] hematuria [ ] increased urinary frequency  Musculoskeletal: [ ] back pain [ ] myalgias [ ] arthralgias [ ] fracture  Skin: [ ] rash [ ] itch  Neurological: [ ] headache [ ] dizziness [ ] syncope [ ] weakness [ ] numbness  Psychiatric: [ ] anxiety [ ] depression  Endocrine: [ ] diabetes [ ] thyroid problem  Hematologic/Lymphatic: [ ] anemia [ ] bleeding problem  Allergic/Immunologic: [ ] itchy eyes [ ] nasal discharge [ ] hives [ ] angioedema  [ ] All other systems negative  [ ] Unable to assess ROS because ________    OBJECTIVE:  ICU Vital Signs Last 24 Hrs  T(C): 38 (2019 00:40), Max: 38.7 (2019 23:14)  T(F): 100.4 (2019 00:40), Max: 101.6 (2019 23:14)  HR: 113 (2019 00:39) (99 - 135)  BP: 67/39 (2019 00:40) (67/39 - 111/70)  BP(mean): --  ABP: --  ABP(mean): --  RR: 18 (2019 00:39) (18 - 18)  SpO2: 92% (2019 00:39) (92% - 95%)        11 @ 07:  -  12 @ 07:00  --------------------------------------------------------  IN: 0 mL / OUT: 475 mL / NET: -475 mL     @ 07:13 @ 05:02  --------------------------------------------------------  IN: 680 mL / OUT: 200 mL / NET: 480 mL      CAPILLARY BLOOD GLUCOSE      POCT Blood Glucose.: 154 mg/dL (2019 00:22)      PHYSICAL EXAM:  General:   HEENT:   Lymph Nodes:  Neck:   Respiratory:   Cardiovascular:   Abdomen:   Extremities:   Skin:   Neurological:  Psychiatry:    LINES:     HOSPITAL MEDICATIONS:  MEDICATIONS  (STANDING):  artificial  tears Solution 1 Drop(s) Both EYES four times a day  ascorbic acid 500 milliGRAM(s) Oral daily  BACItracin   Ointment 1 Application(s) Topical two times a day  buDESOnide  80 MICROgram(s)/formoterol 4.5 MICROgram(s) Inhaler 2 Puff(s) Inhalation two times a day  cefepime   IVPB 500 milliGRAM(s) IV Intermittent once  chlorhexidine 2% Cloths 1 Application(s) Topical daily  chlorhexidine 4% Liquid 1 Application(s) Topical <User Schedule>  clonazePAM  Tablet 1.5 milliGRAM(s) Oral at bedtime  Dakins Solution - 1/4 Strength 1 Application(s) Topical two times a day  dextrose 5%. 1000 milliLiter(s) (50 mL/Hr) IV Continuous <Continuous>  dextrose 50% Injectable 12.5 Gram(s) IV Push once  dextrose 50% Injectable 25 Gram(s) IV Push once  dextrose 50% Injectable 25 Gram(s) IV Push once  famotidine    Tablet 20 milliGRAM(s) Oral daily  ferrous    sulfate Liquid 300 milliGRAM(s) Enteral Tube daily  heparin  Injectable 5000 Unit(s) SubCutaneous every 12 hours  hydrocortisone sodium succinate Injectable 50 milliGRAM(s) IV Push every 8 hours  hydrocortisone sodium succinate Injectable 150 milliGRAM(s) IV Push once  insulin lispro (HumaLOG) corrective regimen sliding scale   SubCutaneous every 6 hours  Medical Marijuana Awake Oil 2 milliLiter(s) 2 milliLiter(s) Enteral Tube <User Schedule>  Medical Marijuana Calm Oil 2 milliLiter(s) 2 milliLiter(s) Enteral Tube <User Schedule>  Medical Marijuana [place] Oil 2 milliLiter(s) 2 milliLiter(s) Enteral Tube <User Schedule>  multivitamin/minerals/iron Oral Solution (CENTRUM) 15 milliLiter(s) Oral daily  norepinephrine Infusion 0.01 MICROgram(s)/kG/Min (0.673 mL/Hr) IV Continuous <Continuous>  zinc sulfate 220 milliGRAM(s) Oral daily    MEDICATIONS  (PRN):  acetaminophen  Suppository .. 650 milliGRAM(s) Rectal every 6 hours PRN Temp greater or equal to 38C (100.4F), Mild Pain (1 - 3)  carboxymethylcellulose 0.5% (Preservative-Free) Ophthalmic Solution 1 Drop(s) Both EYES three times a day PRN dry eyes  dextrose 40% Gel 15 Gram(s) Oral once PRN Blood Glucose LESS THAN 70 milliGRAM(s)/deciliter  glucagon  Injectable 1 milliGRAM(s) IntraMuscular once PRN Glucose LESS THAN 70 milligrams/deciliter  nystatin Powder 1 Application(s) Topical three times a day PRN under breasts      LABS:                        8.0    15.1  )-----------( 392      ( 2019 02:48 )             23.8     Hgb Trend: 8.0<--, 9.0<--, 10.5<--, 9.4<--, 9.7<--  07    129<L>  |  95<L>  |  92<H>  ----------------------------<  119<H>  3.7   |  21<L>  |  2.31<H>    Ca    9.9      2019 02:48  Phos  4.1       Mg     2.0         TPro  4.2<L>  /  Alb  1.4<L>  /  TBili  0.1<L>  /  DBili  x   /  AST  14  /  ALT  8<L>  /  AlkPhos  78      Creatinine Trend: 2.31<--, 2.32<--, 2.23<--, 1.99<--, 1.95<--, 1.84<--    Urinalysis Basic - ( 2019 23:27 )    Color: Yellow / Appearance: Slightly Turbid / S.015 / pH: x  Gluc: x / Ketone: Negative  / Bili: Negative / Urobili: Negative   Blood: x / Protein: 100 / Nitrite: Negative   Leuk Esterase: Large / RBC: 13 /hpf /  /HPF   Sq Epi: x / Non Sq Epi: 0 / Bacteria: Moderate      Arterial Blood Gas:   @ 02:33  7.40/37/161/22/99/-1.4  ABG lactate: --        MICROBIOLOGY:     RADIOLOGY:  [ ] Reviewed and interpreted by me    EKG: CHIEF COMPLAINT: PEG tube dislodgement and fever    HPI: 69 year old female with multiple prolonged hospitalizations, PMH of Advanced dementia (nonverbal, dysphagia, with PEG tube, functional quadriplegic, chronic Gavin catheter) sacral state 4 pressure ulcer, heel pressure ulcers, asthma on chronic prednisone, HLD, CVA, severe lordosis/kyphoscoliosis, 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  thinks she must have pulled it out this morning. He says otherwise she was in her usual state of health at home. He says she has had some intermittent low grade fevers at home, which usually respond to Tylenol. Of note, gavin was due to be changed early 2019 per . Last dose of Eliquis was 19 8pm. In ED patient received 1LNS and cefepime.     Hospital course: PEG tube replaced on     PAST MEDICAL & SURGICAL HISTORY:  Type 2 diabetes mellitus  Dementia  DVT, lower extremity  CVA (cerebral vascular accident)  Fatty pancreas  PNA (pneumonia)  Pulmonary HTN  IGT (impaired glucose tolerance)  Ulcerative colitis  Acid reflux  Anxiety  Depression  Mouth sores  HLD (hyperlipidemia)  Asthma  S/P percutaneous endoscopic gastrostomy (PEG) tube placement: for dysphagia  Humeral head fracture  H/O: hysterectomy  H/O cataract extraction, left  History of knee replacement      FAMILY HISTORY:  Family history of dementia (Grandparent)  Family history of colon cancer (Grandparent)      SOCIAL HISTORY:  Smoking: [ ] Never Smoked [ ] Former Smoker (__ packs x ___ years) [ ] Current Smoker  (__ packs x ___ years)  Substance Use: [ ] Never Used [ ] Used ____  EtOH Use:  Marital Status: [ ] Single [ ]  [ ]  [ ]   Sexual History:   Occupation:  Recent Travel:  Country of Birth:  Advance Directives:    Allergies    ASA; dye contrast (Anaphylaxis)  aspirin (Short breath)  divalproex sodium (Other (Unknown))  Flowers and Plants (Short breath)  Haldol (Other (Unknown))  penicillin (Short breath; Rash)  sulfa drugs (Short breath; Rash)  vancomycin (Rash; Urticaria; Hives)  Xanax (Other (Unknown))    Intolerances        HOME MEDICATIONS:    REVIEW OF SYSTEMS:  Constitutional: [ ] fevers [ ] chills [ ] weight loss [ ] weight gain  HEENT: [ ] dry eyes [ ] eye irritation [ ] postnasal drip [ ] nasal congestion  CV: [ ] chest pain [ ] orthopnea [ ] palpitations [ ] murmur  Resp: [ ] cough [ ] shortness of breath [ ] dyspnea [ ] wheezing [ ] sputum [ ] hemoptysis  GI: [ ] nausea [ ] vomiting [ ] diarrhea [ ] constipation [ ] abd pain [ ] dysphagia   : [ ] dysuria [ ] nocturia [ ] hematuria [ ] increased urinary frequency  Musculoskeletal: [ ] back pain [ ] myalgias [ ] arthralgias [ ] fracture  Skin: [ ] rash [ ] itch  Neurological: [ ] headache [ ] dizziness [ ] syncope [ ] weakness [ ] numbness  Psychiatric: [ ] anxiety [ ] depression  Endocrine: [ ] diabetes [ ] thyroid problem  Hematologic/Lymphatic: [ ] anemia [ ] bleeding problem  Allergic/Immunologic: [ ] itchy eyes [ ] nasal discharge [ ] hives [ ] angioedema  [ ] All other systems negative  [ ] Unable to assess ROS because ________    OBJECTIVE:  ICU Vital Signs Last 24 Hrs  T(C): 38 (2019 00:40), Max: 38.7 (2019 23:14)  T(F): 100.4 (2019 00:40), Max: 101.6 (2019 23:14)  HR: 113 (2019 00:39) (99 - 135)  BP: 67/39 (2019 00:40) (67/39 - 111/70)  BP(mean): --  ABP: --  ABP(mean): --  RR: 18 (2019 00:39) (18 - 18)  SpO2: 92% (2019 00:39) (92% - 95%)        0711 @ 07:01  -  12 @ 07:00  --------------------------------------------------------  IN: 0 mL / OUT: 475 mL / NET: -475 mL     @ 07: @ 05:02  --------------------------------------------------------  IN: 680 mL / OUT: 200 mL / NET: 480 mL      CAPILLARY BLOOD GLUCOSE      POCT Blood Glucose.: 154 mg/dL (2019 00:22)      PHYSICAL EXAM:  General:   HEENT:   Lymph Nodes:  Neck:   Respiratory:   Cardiovascular:   Abdomen:   Extremities:   Skin:   Neurological:  Psychiatry:    LINES:     HOSPITAL MEDICATIONS:  MEDICATIONS  (STANDING):  artificial  tears Solution 1 Drop(s) Both EYES four times a day  ascorbic acid 500 milliGRAM(s) Oral daily  BACItracin   Ointment 1 Application(s) Topical two times a day  buDESOnide  80 MICROgram(s)/formoterol 4.5 MICROgram(s) Inhaler 2 Puff(s) Inhalation two times a day  cefepime   IVPB 500 milliGRAM(s) IV Intermittent once  chlorhexidine 2% Cloths 1 Application(s) Topical daily  chlorhexidine 4% Liquid 1 Application(s) Topical <User Schedule>  clonazePAM  Tablet 1.5 milliGRAM(s) Oral at bedtime  Dakins Solution - 1/4 Strength 1 Application(s) Topical two times a day  dextrose 5%. 1000 milliLiter(s) (50 mL/Hr) IV Continuous <Continuous>  dextrose 50% Injectable 12.5 Gram(s) IV Push once  dextrose 50% Injectable 25 Gram(s) IV Push once  dextrose 50% Injectable 25 Gram(s) IV Push once  famotidine    Tablet 20 milliGRAM(s) Oral daily  ferrous    sulfate Liquid 300 milliGRAM(s) Enteral Tube daily  heparin  Injectable 5000 Unit(s) SubCutaneous every 12 hours  hydrocortisone sodium succinate Injectable 50 milliGRAM(s) IV Push every 8 hours  hydrocortisone sodium succinate Injectable 150 milliGRAM(s) IV Push once  insulin lispro (HumaLOG) corrective regimen sliding scale   SubCutaneous every 6 hours  Medical Marijuana Awake Oil 2 milliLiter(s) 2 milliLiter(s) Enteral Tube <User Schedule>  Medical Marijuana Calm Oil 2 milliLiter(s) 2 milliLiter(s) Enteral Tube <User Schedule>  Medical Marijuana [place] Oil 2 milliLiter(s) 2 milliLiter(s) Enteral Tube <User Schedule>  multivitamin/minerals/iron Oral Solution (CENTRUM) 15 milliLiter(s) Oral daily  norepinephrine Infusion 0.01 MICROgram(s)/kG/Min (0.673 mL/Hr) IV Continuous <Continuous>  zinc sulfate 220 milliGRAM(s) Oral daily    MEDICATIONS  (PRN):  acetaminophen  Suppository .. 650 milliGRAM(s) Rectal every 6 hours PRN Temp greater or equal to 38C (100.4F), Mild Pain (1 - 3)  carboxymethylcellulose 0.5% (Preservative-Free) Ophthalmic Solution 1 Drop(s) Both EYES three times a day PRN dry eyes  dextrose 40% Gel 15 Gram(s) Oral once PRN Blood Glucose LESS THAN 70 milliGRAM(s)/deciliter  glucagon  Injectable 1 milliGRAM(s) IntraMuscular once PRN Glucose LESS THAN 70 milligrams/deciliter  nystatin Powder 1 Application(s) Topical three times a day PRN under breasts      LABS:                        8.0    15.1  )-----------( 392      ( 2019 02:48 )             23.8     Hgb Trend: 8.0<--, 9.0<--, 10.5<--, 9.4<--, 9.7<--  07-13    129<L>  |  95<L>  |  92<H>  ----------------------------<  119<H>  3.7   |  21<L>  |  2.31<H>    Ca    9.9      2019 02:48  Phos  4.1     07-13  Mg     2.0     07-13    TPro  4.2<L>  /  Alb  1.4<L>  /  TBili  0.1<L>  /  DBili  x   /  AST  14  /  ALT  8<L>  /  AlkPhos  78  07-13    Creatinine Trend: 2.31<--, 2.32<--, 2.23<--, 1.99<--, 1.95<--, 1.84<--    Urinalysis Basic - ( 2019 23:27 )    Color: Yellow / Appearance: Slightly Turbid / S.015 / pH: x  Gluc: x / Ketone: Negative  / Bili: Negative / Urobili: Negative   Blood: x / Protein: 100 / Nitrite: Negative   Leuk Esterase: Large / RBC: 13 /hpf /  /HPF   Sq Epi: x / Non Sq Epi: 0 / Bacteria: Moderate      Arterial Blood Gas:   @ 02:33  7.40/37/161/22/99/-1.4  ABG lactate: --        MICROBIOLOGY:     RADIOLOGY:  [ ] Reviewed and interpreted by me    EKG: CHIEF COMPLAINT: PEG tube dislodgement and fever    HPI: 69 year old female with multiple prolonged hospitalizations, PMH of Advanced dementia (nonverbal, dysphagia, with PEG tube, functional quadriplegic, chronic Gavin catheter) sacral state 4 pressure ulcer, heel pressure ulcers, asthma on chronic prednisone, HLD, CVA, severe lordosis/kyphoscoliosis, 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  thinks she must have pulled it out this morning. He says otherwise she was in her usual state of health at home. He says she has had some intermittent low grade fevers at home, which usually respond to Tylenol. Of note, gavin was due to be changed early 2019 per . Last dose of Eliquis was 19 8pm. In ED patient received 1LNS and cefepime.     Hospital course: PEG tube replaced on . Hospital course is c/b recurrent fever 2/2 to PNA, anemia s/p 1 PRBC transfusion, TRANG 2/2 prerenal azotemia and ischemic ATN from vanc. Patient developed hypothermia and increased WOB last night and persistent hypotension sBP 50-60s unresponsive to 1.5 L of LR and 7.5 midodrine, requiring pressor.    PAST MEDICAL & SURGICAL HISTORY:  Type 2 diabetes mellitus  Dementia  DVT, lower extremity  CVA (cerebral vascular accident)  Fatty pancreas  PNA (pneumonia)  Pulmonary HTN  IGT (impaired glucose tolerance)  Ulcerative colitis  Acid reflux  Anxiety  Depression  Mouth sores  HLD (hyperlipidemia)  Asthma  S/P percutaneous endoscopic gastrostomy (PEG) tube placement: for dysphagia  Humeral head fracture  H/O: hysterectomy  H/O cataract extraction, left  History of knee replacement      FAMILY HISTORY:  Family history of dementia (Grandparent)  Family history of colon cancer (Grandparent)      Allergies    ASA; dye contrast (Anaphylaxis)  aspirin (Short breath)  divalproex sodium (Other (Unknown))  Flowers and Plants (Short breath)  Haldol (Other (Unknown))  penicillin (Short breath; Rash)  sulfa drugs (Short breath; Rash)  vancomycin (Rash; Urticaria; Hives)  Xanax (Other (Unknown))      HOME MEDICATIONS:    REVIEW OF SYSTEMS:  Constitutional: [ ] fevers [ ] chills [ ] weight loss [ ] weight gain  HEENT: [ ] dry eyes [ ] eye irritation [ ] postnasal drip [ ] nasal congestion  CV: [ ] chest pain [ ] orthopnea [ ] palpitations [ ] murmur  Resp: [ ] cough [ ] shortness of breath [ ] dyspnea [ ] wheezing [ ] sputum [ ] hemoptysis  GI: [ ] nausea [ ] vomiting [ ] diarrhea [ ] constipation [ ] abd pain [ ] dysphagia   : [ ] dysuria [ ] nocturia [ ] hematuria [ ] increased urinary frequency  Musculoskeletal: [ ] back pain [ ] myalgias [ ] arthralgias [ ] fracture  Skin: [ ] rash [ ] itch  Neurological: [ ] headache [ ] dizziness [ ] syncope [ ] weakness [ ] numbness  Psychiatric: [ ] anxiety [ ] depression  Endocrine: [ ] diabetes [ ] thyroid problem  Hematologic/Lymphatic: [ ] anemia [ ] bleeding problem  Allergic/Immunologic: [ ] itchy eyes [ ] nasal discharge [ ] hives [ ] angioedema  [ ] All other systems negative  [X] Unable to assess ROS because of clinical condition    OBJECTIVE:  ICU Vital Signs Last 24 Hrs  T(C): 38 (2019 00:40), Max: 38.7 (2019 23:14)  T(F): 100.4 (2019 00:40), Max: 101.6 (2019 23:14)  HR: 113 (2019 00:39) (99 - 135)  BP: 67/39 (2019 00:40) (67/39 - 111/70)  BP(mean): --  ABP: --  ABP(mean): --  RR: 18 (2019 00:39) (18 - 18)  SpO2: 92% (2019 00:39) (92% - 95%)         @ 07:01  -   @ 07:00  --------------------------------------------------------  IN: 0 mL / OUT: 475 mL / NET: -475 mL     @ 07: @ 05:02  --------------------------------------------------------  IN: 680 mL / OUT: 200 mL / NET: 480 mL      CAPILLARY BLOOD GLUCOSE      POCT Blood Glucose.: 154 mg/dL (2019 00:22)    PHYSICAL EXAM:  General: lethargic  HEENT: Normocephalic, PERRLA, periorbital rash on L eye  Neck: supple  Respiratory: Clear to auscultation bilaterally; No wheeze, ronchi or ral  Cardiovascular: Regular rate and rhythm; No murmurs, rubs, or gallops  Abdomen: Soft, Nontender, Nondistended; Bowel sounds present  Extremities: bilateral peripheral edema, LLE  Skin: sacral decubitus   Neurological: AOx0      LINES:     HOSPITAL MEDICATIONS:  MEDICATIONS  (STANDING):  artificial  tears Solution 1 Drop(s) Both EYES four times a day  ascorbic acid 500 milliGRAM(s) Oral daily  BACItracin   Ointment 1 Application(s) Topical two times a day  buDESOnide  80 MICROgram(s)/formoterol 4.5 MICROgram(s) Inhaler 2 Puff(s) Inhalation two times a day  cefepime   IVPB 500 milliGRAM(s) IV Intermittent once  chlorhexidine 2% Cloths 1 Application(s) Topical daily  chlorhexidine 4% Liquid 1 Application(s) Topical <User Schedule>  clonazePAM  Tablet 1.5 milliGRAM(s) Oral at bedtime  Dakins Solution - 1/4 Strength 1 Application(s) Topical two times a day  dextrose 5%. 1000 milliLiter(s) (50 mL/Hr) IV Continuous <Continuous>  dextrose 50% Injectable 12.5 Gram(s) IV Push once  dextrose 50% Injectable 25 Gram(s) IV Push once  dextrose 50% Injectable 25 Gram(s) IV Push once  famotidine    Tablet 20 milliGRAM(s) Oral daily  ferrous    sulfate Liquid 300 milliGRAM(s) Enteral Tube daily  heparin  Injectable 5000 Unit(s) SubCutaneous every 12 hours  hydrocortisone sodium succinate Injectable 50 milliGRAM(s) IV Push every 8 hours  hydrocortisone sodium succinate Injectable 150 milliGRAM(s) IV Push once  insulin lispro (HumaLOG) corrective regimen sliding scale   SubCutaneous every 6 hours  Medical Marijuana Awake Oil 2 milliLiter(s) 2 milliLiter(s) Enteral Tube <User Schedule>  Medical Marijuana Calm Oil 2 milliLiter(s) 2 milliLiter(s) Enteral Tube <User Schedule>  Medical Marijuana [place] Oil 2 milliLiter(s) 2 milliLiter(s) Enteral Tube <User Schedule>  multivitamin/minerals/iron Oral Solution (CENTRUM) 15 milliLiter(s) Oral daily  norepinephrine Infusion 0.01 MICROgram(s)/kG/Min (0.673 mL/Hr) IV Continuous <Continuous>  zinc sulfate 220 milliGRAM(s) Oral daily    MEDICATIONS  (PRN):  acetaminophen  Suppository .. 650 milliGRAM(s) Rectal every 6 hours PRN Temp greater or equal to 38C (100.4F), Mild Pain (1 - 3)  carboxymethylcellulose 0.5% (Preservative-Free) Ophthalmic Solution 1 Drop(s) Both EYES three times a day PRN dry eyes  dextrose 40% Gel 15 Gram(s) Oral once PRN Blood Glucose LESS THAN 70 milliGRAM(s)/deciliter  glucagon  Injectable 1 milliGRAM(s) IntraMuscular once PRN Glucose LESS THAN 70 milligrams/deciliter  nystatin Powder 1 Application(s) Topical three times a day PRN under breasts      LABS:                        8.0    15.1  )-----------( 392      ( 2019 02:48 )             23.8     Hgb Trend: 8.0<--, 9.0<--, 10.5<--, 9.4<--, 9.7<--  0713    129<L>  |  95<L>  |  92<H>  ----------------------------<  119<H>  3.7   |  21<L>  |  2.31<H>    Ca    9.9      2019 02:48  Phos  4.1     07-13  Mg     2.0         TPro  4.2<L>  /  Alb  1.4<L>  /  TBili  0.1<L>  /  DBili  x   /  AST  14  /  ALT  8<L>  /  AlkPhos  78  13    Creatinine Trend: 2.31<--, 2.32<--, 2.23<--, 1.99<--, 1.95<--, 1.84<--    Urinalysis Basic - ( 2019 23:27 )    Color: Yellow / Appearance: Slightly Turbid / S.015 / pH: x  Gluc: x / Ketone: Negative  / Bili: Negative / Urobili: Negative   Blood: x / Protein: 100 / Nitrite: Negative   Leuk Esterase: Large / RBC: 13 /hpf /  /HPF   Sq Epi: x / Non Sq Epi: 0 / Bacteria: Moderate      Arterial Blood Gas:  - @ 02:33  7.40/37/161/22/99/-1.4  ABG lactate: --        MICROBIOLOGY: reviewed    RADIOLOGY:  [X] Reviewed and interpreted by me

## 2019-07-13 NOTE — PROGRESS NOTE ADULT - SUBJECTIVE AND OBJECTIVE BOX
[facility]-Lolita Pal NP- PROGRESS NOTE    Patient seen and examined in bed with daughter at the bedside. Patient non verbal on HiFlow Nasal O2.       Hospital Medications:   MEDICATIONS  (STANDING):  artificial tears (preservative free) Ophthalmic Solution 1 Drop(s) Both EYES four times a day  ascorbic acid 500 milliGRAM(s) Oral daily  BACItracin   Ointment 1 Application(s) Topical two times a day  chlorhexidine 2% Cloths 1 Application(s) Topical daily  chlorhexidine 4% Liquid 1 Application(s) Topical <User Schedule>  Dakins Solution - 1/4 Strength 1 Application(s) Topical two times a day  dextrose 5%. 1000 milliLiter(s) (50 mL/Hr) IV Continuous <Continuous>  dextrose 50% Injectable 12.5 Gram(s) IV Push once  dextrose 50% Injectable 25 Gram(s) IV Push once  dextrose 50% Injectable 25 Gram(s) IV Push once  famotidine    Tablet 20 milliGRAM(s) Oral daily  ferrous    sulfate Liquid 300 milliGRAM(s) Enteral Tube daily  formoterol for nebulization 20 MICROGram(s) Nebulizer two times a day  heparin  Infusion.  Unit(s)/Hr (10 mL/Hr) IV Continuous <Continuous>  hydrocortisone sodium succinate Injectable 50 milliGRAM(s) IV Push every 6 hours  insulin lispro (HumaLOG) corrective regimen sliding scale   SubCutaneous every 6 hours  levothyroxine Injectable 60 MICROGram(s) IV Push at bedtime  meropenem  IVPB 500 milliGRAM(s) IV Intermittent every 12 hours  multivitamin/minerals/iron Oral Solution (CENTRUM) 15 milliLiter(s) Oral daily  norepinephrine Infusion 0.01 MICROgram(s)/kG/Min (0.673 mL/Hr) IV Continuous <Continuous>  zinc sulfate 220 milliGRAM(s) Oral daily      REVIEW OF SYSTEMS:  UTO due to medical condition    VITALS:  T(F): 96.7 (19 @ 16:00), Max: 101.6 (19 @ 23:14)  HR: 107 (19 @ 18:00)  BP: 139/73 (19 @ 18:00)  RR: 17 (19 @ 18:00)  SpO2: 98% (19 @ 18:00)  Wt(kg): --    07-12 @ 07:01  -   @ 07:00  --------------------------------------------------------  IN: 778.6 mL / OUT: 400 mL / NET: 378.6 mL     @ 07:01  -   @ 19:12  --------------------------------------------------------  IN: 57.2 mL / OUT: 85 mL / NET: -27.8 mL      Height (cm): 142.24 ( @ 03:06)  Weight (kg): 55.2 ( 03:06)  BMI (kg/m2): 27.3 ( 03:06)  BSA (m2): 1.44 ( 03:06)    PHYSICAL EXAM:  Constitutional: NAD  HEENT: PERRL  Neck: No JVD  Respiratory: scattered rhonchi  Cardiovascular: S1, S2, RRR  Gastrointestinal: BS+, soft, NT/ND, + PEG  Extremities: + edema  Neurological: Nonverbal  Psychiatric: Appears calm  : + gavin (chronic)  Skin: No rashes  Vascular Access:    LABS:  Blood Gas Arterial - Lytes,H+H,iCa,Lact: Performed In Lab ( 09:51)  Blood Gas Arterial - Calcium, Ionized: 1.41 mmoL/L ( 09:51)  Blood Gas Arterial - Chloride: 102 mmoL/L ( 09:51)  Blood Gas Arterial - Glucose: 184 mg/dL ( 09:51)  Blood Gas Arterial - Lytes,H+H,iCa,Lact: Performed In Lab ( 02:33)  Blood Gas Source Arterial: Arterial (:33)  Blood Gas Arterial - Calcium, Ionized: 1.43 mmoL/L ( 02:33)  Blood Gas Arterial - Chloride: 101 mmoL/L (:33)  Blood Gas Arterial - Glucose: 112 mg/dL (07-13 @ 02:33)          133<L>  |  94<L>  |  92<H>  ----------------------------<  134<H>  4.1   |  21<L>  |  2.40<H>      129<L>  |  95<L>  |  92<H>  ----------------------------<  119<H>  3.7   |  21<L>  |  2.31<H>      133<L>  |  96  |  92<H>  ----------------------------<  118<H>  3.9   |  21<L>  |  2.32<H>    Ca    10.3      2019 05:37  Phos  4.1       Mg     2.0         TPro  4.2<L>  /  Alb  1.4<L>  /  TBili  0.1<L>  /  DBili  x   /  AST  14  /  ALT  8<L>  /  AlkPhos  78                              8.8    24.39 )-----------( 432      ( 2019 10:58 )             28.4       Urine Studies:  Urinalysis Basic - ( 2019 05:37 )    Color: Yellow / Appearance: Slightly Turbid / S.016 / pH: x  Gluc: x / Ketone: Negative  / Bili: Negative / Urobili: Negative   Blood: x / Protein: 300 mg/dL / Nitrite: Negative   Leuk Esterase: Large / RBC: 10-25 /hpf / WBC >50 /HPF   Sq Epi: x / Non Sq Epi: 0 / Bacteria: Moderate      Sodium, Random Urine: 41 mmol/L ( @ 07:46)  Osmolality, Random Urine: 269 mos/kg ( @ 07:46)  Osmolality, Random Urine: 269 mos/kg ( @ 19:17)  Sodium, Random Urine: <20 mmol/L ( @ 19:17)  Potassium, Random Urine: 57 mmol/L ( @ 19:17)  Chloride, Random Urine: <35 mmol/L ( @ 19:17)  Creatinine, Random Urine: 20 mg/dL ( @ 19:17)      RADIOLOGY & ADDITIONAL STUDIES:

## 2019-07-13 NOTE — PROGRESS NOTE ADULT - ASSESSMENT
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, RLE DVT (on AC), multiple decub ulcers, UTI, Asp PNA and initially readmitted for dislodged PEG and now w/ sepsis due to aspiration PNA and UTI again c/b TRANG and hyperkalemia, RRT on 7/13 for hypotension in the SBP 50-60s and hypoxia in the 80s, admitted to MICU for shock requiring IV pressor support    #Neuro  Baseline AOx0, nonverbal, bedbound  Currently not alert, likely encephalopathic 2/2 sepsis   F/u TSH  CTH given on AC and unable to assess neuro exam     #CV  Shock likely 2/2 sepsis, however differetnail also includes adrenal insufficiency given chronic daily prednisone use, and PE given not on full dose AC since hosptiliatzation    Unable to obtain cortisol level prior to hydrocortisone   Continue stress dose steroids  F/u troponin and EKG, proBNP  Patient hx of RLE DVT on Eliquis outpatient, stopped on 6/26 with Hg drop to 7.1 received 1 unit PRBC and responded appriorateily without overt signs of bleeding   Will get LE dopplers to assesss LE   Consider heparin gtt    #Pulm  Patient hypoxic to 80s during RRT  ABG on 100% FIO2 PO2 161 - transition from nonbreather to high flow   Differential includes pulmonary edema, infectious etiology, ?PCP  Wean O2 as tolerated  POCUS diffuse B lines, will hold off on further IVF     #GI  NPO for now  Albumin 1.4, severely malnourished     #Endo  On chronic steroids at home, continue stress dose steroids  F/u TSH  Unable to obtain am cortisol prior to steroid administration     #ID  Patient with hx of multiple infections - including MRSA hip and aspiration PNA   Start Meropenem, f/u Vancomycin level in AM to dose Vanc  UA+, f/u UCx, blood cx sent in AM, sputum cx   Sacral decibutus ulcers to not appear to be infected   Wound care following   ESR 91  Patient not on PCP prophylaxis?, will discuss with ID in AM    #Renal   Baseline SCr 0.3, now with TRANG  BUN elevated to 92, ?uremic encephalopathy   F/u urine studies  Blackman exchanged on 7/13 - UA +, f/u UCx  Hyponatremic - f/u urine studies, urine osmoles, serum osmoles     #Heme  Anemic at baseline, normocytic, currently at baseline     #DVT PPX  HSQ 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, RLE DVT (on AC), multiple decub ulcers, UTI, Asp PNA and initially readmitted for dislodged PEG and now w/ sepsis due to aspiration PNA and UTI again c/b TRANG and hyperkalemia, RRT on 7/13 for hypotension in the SBP 50-60s and hypoxia in the 80s, admitted to MICU for shock requiring IV pressor support    #Neuro  Baseline AOx0, nonverbal, bedbound  Currently not alert, likely encephalopathic 2/2 sepsis   F/u TSH  If no improvement, will consider CTH given unable to assess neuro exam     #CV  Shock likely 2/2 sepsis, however differetnail also includes adrenal insufficiency given chronic daily prednisone use, and PE given not on full dose AC since hosptiliatzation    Unable to obtain cortisol level prior to hydrocortisone   Continue stress dose steroids  F/u troponin and EKG, proBNP  Patient hx of RLE DVT on Eliquis outpatient, stopped on 6/26 with Hg drop to 7.1 received 1 unit PRBC and responded appriorateily without overt signs of bleeding   Will get LE dopplers to assesss LE   Consider heparin gtt    #Pulm  Patient hypoxic to 80s during RRT  ABG on 100% FIO2 PO2 161 - transition from nonbreather to high flow   Differential includes pulmonary edema, infectious etiology, ?PCP  Wean O2 as tolerated  POCUS diffuse B lines, will hold off on further IVF     #GI  NPO for now  Albumin 1.4, severely malnourished     #Endo  On chronic steroids at home, continue stress dose steroids  F/u TSH  Unable to obtain am cortisol prior to steroid administration     #ID  Patient with hx of multiple infections - including MRSA hip and aspiration PNA   Start Meropenem, f/u Vancomycin level in AM to dose Vanc  UA+, f/u UCx, blood cx sent in AM, sputum cx   Sacral decibutus ulcers to not appear to be infected   Wound care following   ESR 91  Patient not on PCP prophylaxis?, will discuss with ID in AM    #Renal   Baseline SCr 0.3, now with TRANG  BUN elevated to 92, ?uremic encephalopathy   F/u urine studies  Blackman exchanged on 7/13 - UA +, f/u UCx  Hyponatremic - f/u urine studies, urine osmoles, serum osmoles     #Heme  Anemic at baseline, normocytic, currently at baseline     #DVT PPX  HSQ 69F with PMHx of severe dementia (AOx0), nonverbal, and bedbound, asthma on chronic steroids, CVA, HLD, chronic MRSA infection of R hip prosthesis s/p spacer in place PEG, RLE DVT (on AC), multiple decub ulcers, UTI, Asp PNA and initially readmitted for dislodged PEG and now w/ sepsis due to aspiration PNA and UTI again c/b TRANG and hyperkalemia, RRT on 7/13 for hypotension in the SBP 50-60s and hypoxia in the 80s, admitted to MICU for shock requiring IV pressor support    #Neuro  Baseline AOx0, nonverbal, bedbound  Currently not alert, likely encephalopathic 2/2 sepsis   F/u TSH  If no improvement, will consider CTH given unable to assess neuro exam   Pt's home med: gabapentin and Klonopin--held in the setting of mental status    #CV  Shock likely 2/2 sepsis, however differential also includes adrenal insufficiency given chronic daily prednisone use, and PE given not on full dose AC since hospitalization    Unable to obtain cortisol level prior to hydrocortisone   Continue stress dose steroids  F/u troponin and EKG, proBNP  Patient hx of RLE DVT on Eliquis outpatient, stopped on 6/26 with Hg drop to 7.1 received 1 unit PRBC and responded appropriately without overt signs of bleeding   Will get LE dopplers to assess LE   Consider heparin gtt    #Pulm  Patient hypoxic to 80s during RRT  ABG on 100% FIO2 PO2 161 - transition from nonbreather to high flow   Differential includes pulmonary edema, infectious etiology, ?PCP  Wean O2 as tolerated  POCUS diffuse B lines, will hold off on further IVF     #GI  NPO for now  Albumin 1.4, severely malnourished     #Endo  On chronic steroids at home, continue stress dose steroids  F/u TSH  Unable to obtain am cortisol prior to steroid administration     #ID  Patient with hx of multiple infections - including MRSA hip and aspiration PNA   Start Meropenem, f/u Vancomycin level in AM to dose Vanc  UA+, f/u UCx, blood cx sent in AM, sputum cx   Sacral decibutus ulcers to not appear to be infected   Wound care following   ESR 91  Patient not on PCP prophylaxis?, will discuss with ID in AM    #Renal   Baseline SCr 0.3, now with TRANG  BUN elevated to 92, ?uremic encephalopathy   F/u urine studies  Blackman exchanged on 7/13 - UA +, f/u UCx  Hyponatremic - f/u urine studies, urine osmoles, serum osmoles     #Heme  Anemic at baseline, normocytic, currently at baseline     #DVT PPX  HSQ

## 2019-07-13 NOTE — PROGRESS NOTE ADULT - ASSESSMENT
69 year old female with multiple prolonged hospitalizations, known to Dr Francis from our office, with PMH of Advanced dementia (nonverbal, dysphagia, with PEG tube, functional quadriplegic, chronic Blackman catheter) sacral state 4 pressure ulcer, heel pressure ulcers, asthma on chronic prednisone, HLD, CVA, s is, 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, left knee fracture, DM, RLE DVT, returned to  [facility] ED  with PEG tube being dislodged. since admission, went to IR to have PEG replaced.  TRANG and hyperkalemia.    1. TRANG likely multifactorial and the vancomycin was likely a contributing factor.  Creatinine rising as expected.   2. Hyponatremia from TRANG, improving  3. Hypercalcemia, stable   4. Hyperkalemia improved on the new feeds   5. Anemia.     RECOMMEND:  - Off pressors and IVF's for now  - Continue to hold Vanco. The creatinine will not start the trend to decline till the vanco level is around or under 15.  Please trend vanco level  - Continue TF with Nephro till the creatinine improves    - BMP daily  - Monitor I's and O's  - Plan as per MICU team and ID

## 2019-07-14 LAB
ALBUMIN SERPL ELPH-MCNC: 2.2 G/DL — LOW (ref 3.3–5)
ALP SERPL-CCNC: 100 U/L — SIGNIFICANT CHANGE UP (ref 40–120)
ALT FLD-CCNC: 11 U/L — SIGNIFICANT CHANGE UP (ref 10–45)
ANION GAP SERPL CALC-SCNC: 19 MMOL/L — HIGH (ref 5–17)
APTT BLD: 63 SEC — HIGH (ref 27.5–36.3)
APTT BLD: 80.2 SEC — HIGH (ref 27.5–36.3)
AST SERPL-CCNC: 10 U/L — SIGNIFICANT CHANGE UP (ref 10–40)
BILIRUB SERPL-MCNC: 0.2 MG/DL — SIGNIFICANT CHANGE UP (ref 0.2–1.2)
BUN SERPL-MCNC: 100 MG/DL — HIGH (ref 7–23)
CALCIUM SERPL-MCNC: 10.3 MG/DL — SIGNIFICANT CHANGE UP (ref 8.4–10.5)
CHLORIDE SERPL-SCNC: 93 MMOL/L — LOW (ref 96–108)
CO2 SERPL-SCNC: 19 MMOL/L — LOW (ref 22–31)
CREAT SERPL-MCNC: 2.62 MG/DL — HIGH (ref 0.5–1.3)
CULTURE RESULTS: SIGNIFICANT CHANGE UP
GLUCOSE BLDC GLUCOMTR-MCNC: 149 MG/DL — HIGH (ref 70–99)
GLUCOSE BLDC GLUCOMTR-MCNC: 163 MG/DL — HIGH (ref 70–99)
GLUCOSE BLDC GLUCOMTR-MCNC: 166 MG/DL — HIGH (ref 70–99)
GLUCOSE SERPL-MCNC: 136 MG/DL — HIGH (ref 70–99)
HCT VFR BLD CALC: 27.5 % — LOW (ref 34.5–45)
HGB BLD-MCNC: 9.6 G/DL — LOW (ref 11.5–15.5)
INR BLD: 1.1 RATIO — SIGNIFICANT CHANGE UP (ref 0.88–1.16)
MAGNESIUM SERPL-MCNC: 2.4 MG/DL — SIGNIFICANT CHANGE UP (ref 1.6–2.6)
MCHC RBC-ENTMCNC: 31.1 PG — SIGNIFICANT CHANGE UP (ref 27–34)
MCHC RBC-ENTMCNC: 34.9 GM/DL — SIGNIFICANT CHANGE UP (ref 32–36)
MCV RBC AUTO: 89.2 FL — SIGNIFICANT CHANGE UP (ref 80–100)
PHOSPHATE SERPL-MCNC: 5.2 MG/DL — HIGH (ref 2.5–4.5)
PLATELET # BLD AUTO: 468 K/UL — HIGH (ref 150–400)
POTASSIUM SERPL-MCNC: 4.4 MMOL/L — SIGNIFICANT CHANGE UP (ref 3.5–5.3)
POTASSIUM SERPL-SCNC: 4.4 MMOL/L — SIGNIFICANT CHANGE UP (ref 3.5–5.3)
PROT SERPL-MCNC: 5.5 G/DL — LOW (ref 6–8.3)
PROTHROM AB SERPL-ACNC: 12.6 SEC — SIGNIFICANT CHANGE UP (ref 10–12.9)
RBC # BLD: 3.08 M/UL — LOW (ref 3.8–5.2)
RBC # FLD: 13.8 % — SIGNIFICANT CHANGE UP (ref 10.3–14.5)
SODIUM SERPL-SCNC: 131 MMOL/L — LOW (ref 135–145)
SPECIMEN SOURCE: SIGNIFICANT CHANGE UP
VANCOMYCIN FLD-MCNC: 23.4 UG/ML — SIGNIFICANT CHANGE UP
WBC # BLD: 29.8 K/UL — HIGH (ref 3.8–10.5)
WBC # FLD AUTO: 29.8 K/UL — HIGH (ref 3.8–10.5)

## 2019-07-14 RX ORDER — GABAPENTIN 400 MG/1
300 CAPSULE ORAL EVERY 12 HOURS
Refills: 0 | Status: DISCONTINUED | OUTPATIENT
Start: 2019-07-14 | End: 2019-07-14

## 2019-07-14 RX ORDER — CLONAZEPAM 1 MG
1 TABLET ORAL ONCE
Refills: 0 | Status: DISCONTINUED | OUTPATIENT
Start: 2019-07-14 | End: 2019-07-14

## 2019-07-14 RX ORDER — QUETIAPINE FUMARATE 200 MG/1
25 TABLET, FILM COATED ORAL AT BEDTIME
Refills: 0 | Status: DISCONTINUED | OUTPATIENT
Start: 2019-07-14 | End: 2019-07-14

## 2019-07-14 RX ORDER — HYDROCORTISONE 20 MG
25 TABLET ORAL EVERY 6 HOURS
Refills: 0 | Status: DISCONTINUED | OUTPATIENT
Start: 2019-07-14 | End: 2019-07-15

## 2019-07-14 RX ORDER — ALBUMIN HUMAN 25 %
250 VIAL (ML) INTRAVENOUS ONCE
Refills: 0 | Status: COMPLETED | OUTPATIENT
Start: 2019-07-14 | End: 2019-07-14

## 2019-07-14 RX ORDER — GABAPENTIN 400 MG/1
300 CAPSULE ORAL EVERY 12 HOURS
Refills: 0 | Status: DISCONTINUED | OUTPATIENT
Start: 2019-07-14 | End: 2019-07-30

## 2019-07-14 RX ORDER — CLONAZEPAM 1 MG
1 TABLET ORAL AT BEDTIME
Refills: 0 | Status: DISCONTINUED | OUTPATIENT
Start: 2019-07-14 | End: 2019-07-21

## 2019-07-14 RX ORDER — QUETIAPINE FUMARATE 200 MG/1
25 TABLET, FILM COATED ORAL AT BEDTIME
Refills: 0 | Status: DISCONTINUED | OUTPATIENT
Start: 2019-07-14 | End: 2019-08-08

## 2019-07-14 RX ADMIN — Medication 1 MILLIGRAM(S): at 00:33

## 2019-07-14 RX ADMIN — Medication 1 APPLICATION(S): at 05:16

## 2019-07-14 RX ADMIN — Medication 300 MILLIGRAM(S): at 11:20

## 2019-07-14 RX ADMIN — Medication 25 MILLIGRAM(S): at 23:59

## 2019-07-14 RX ADMIN — Medication 1 MILLIGRAM(S): at 21:09

## 2019-07-14 RX ADMIN — GABAPENTIN 300 MILLIGRAM(S): 400 CAPSULE ORAL at 18:08

## 2019-07-14 RX ADMIN — Medication 60 MICROGRAM(S): at 22:06

## 2019-07-14 RX ADMIN — Medication 15 MILLILITER(S): at 11:21

## 2019-07-14 RX ADMIN — Medication 50 MILLIGRAM(S): at 11:20

## 2019-07-14 RX ADMIN — Medication 125 MILLILITER(S): at 13:52

## 2019-07-14 RX ADMIN — CHLORHEXIDINE GLUCONATE 1 APPLICATION(S): 213 SOLUTION TOPICAL at 11:33

## 2019-07-14 RX ADMIN — Medication 20 MICROGRAM(S): at 18:47

## 2019-07-14 RX ADMIN — HEPARIN SODIUM 1000 UNIT(S)/HR: 5000 INJECTION INTRAVENOUS; SUBCUTANEOUS at 03:50

## 2019-07-14 RX ADMIN — Medication 25 MILLIGRAM(S): at 18:07

## 2019-07-14 RX ADMIN — Medication 1 DROP(S): at 18:08

## 2019-07-14 RX ADMIN — QUETIAPINE FUMARATE 25 MILLIGRAM(S): 200 TABLET, FILM COATED ORAL at 21:09

## 2019-07-14 RX ADMIN — MEROPENEM 100 MILLIGRAM(S): 1 INJECTION INTRAVENOUS at 05:16

## 2019-07-14 RX ADMIN — Medication 50 MILLIGRAM(S): at 05:16

## 2019-07-14 RX ADMIN — Medication 1 APPLICATION(S): at 18:08

## 2019-07-14 RX ADMIN — Medication 1: at 11:34

## 2019-07-14 RX ADMIN — Medication 20 MICROGRAM(S): at 05:50

## 2019-07-14 RX ADMIN — Medication 1 DROP(S): at 11:33

## 2019-07-14 RX ADMIN — Medication 1 APPLICATION(S): at 18:07

## 2019-07-14 RX ADMIN — Medication 1 DROP(S): at 05:16

## 2019-07-14 RX ADMIN — MEROPENEM 100 MILLIGRAM(S): 1 INJECTION INTRAVENOUS at 18:08

## 2019-07-14 RX ADMIN — Medication 500 MILLIGRAM(S): at 11:20

## 2019-07-14 RX ADMIN — CHLORHEXIDINE GLUCONATE 1 APPLICATION(S): 213 SOLUTION TOPICAL at 05:10

## 2019-07-14 RX ADMIN — Medication 1 APPLICATION(S): at 05:31

## 2019-07-14 RX ADMIN — HEPARIN SODIUM 1000 UNIT(S)/HR: 5000 INJECTION INTRAVENOUS; SUBCUTANEOUS at 11:19

## 2019-07-14 RX ADMIN — ZINC SULFATE TAB 220 MG (50 MG ZINC EQUIVALENT) 220 MILLIGRAM(S): 220 (50 ZN) TAB at 11:21

## 2019-07-14 RX ADMIN — Medication 1: at 18:16

## 2019-07-14 RX ADMIN — FAMOTIDINE 20 MILLIGRAM(S): 10 INJECTION INTRAVENOUS at 11:20

## 2019-07-14 NOTE — PROGRESS NOTE ADULT - SUBJECTIVE AND OBJECTIVE BOX
---___---___---___---___---___---___ ---___---___---___---___---___---___---___---___---                  M E D I C A L   A T T E N D I N G   P R O G R E S S   N O T E  ---___---___---___---___---___---___ ---___---___---___---___---___---___---___---___---        ================================================    ++CHIEF COMPLAINT:   Patient is a 69y old  Female who presents with a chief complaint of PEG tube dislodgement, fever, leukocytosis (2019 08:14)    libia and sepsis    ---___---___---___---___---___---  PAST MEDICAL / Surgical  HISTORY:  PAST MEDICAL & SURGICAL HISTORY:  Type 2 diabetes mellitus  Dementia  DVT, lower extremity  CVA (cerebral vascular accident)  Fatty pancreas  PNA (pneumonia)  Pulmonary HTN  IGT (impaired glucose tolerance)  Ulcerative colitis  Acid reflux  Anxiety  Depression  Mouth sores  HLD (hyperlipidemia)  Asthma  S/P percutaneous endoscopic gastrostomy (PEG) tube placement: for dysphagia  Humeral head fracture  H/O: hysterectomy  H/O cataract extraction, left  History of knee replacement      ---___---___---___---___---___---  FAMILY HISTORY:   FAMILY HISTORY:  Family history of dementia (Grandparent)  Family history of colon cancer (Grandparent)        ---___---___---___---___---___---  ALLERGIES:   Allergies    ASA; dye contrast (Anaphylaxis)  aspirin (Short breath)  divalproex sodium (Other (Unknown))  Flowers and Plants (Short breath)  Haldol (Other (Unknown))  penicillin (Short breath; Rash)  sulfa drugs (Short breath; Rash)  vancomycin (Rash; Urticaria; Hives)  Xanax (Other (Unknown))    Intolerances        ---___---___---___---___---___---  MEDICATIONS:  MEDICATIONS  (STANDING):  albumin human  5% IVPB 250 milliLiter(s) IV Intermittent once  artificial tears (preservative free) Ophthalmic Solution 1 Drop(s) Both EYES four times a day  ascorbic acid 500 milliGRAM(s) Oral daily  BACItracin   Ointment 1 Application(s) Topical two times a day  chlorhexidine 2% Cloths 1 Application(s) Topical daily  chlorhexidine 4% Liquid 1 Application(s) Topical <User Schedule>  clonazePAM  Tablet 1 milliGRAM(s) Oral at bedtime  Dakins Solution - 1/4 Strength 1 Application(s) Topical two times a day  dextrose 5%. 1000 milliLiter(s) (50 mL/Hr) IV Continuous <Continuous>  dextrose 50% Injectable 12.5 Gram(s) IV Push once  dextrose 50% Injectable 25 Gram(s) IV Push once  dextrose 50% Injectable 25 Gram(s) IV Push once  famotidine    Tablet 20 milliGRAM(s) Oral daily  ferrous    sulfate Liquid 300 milliGRAM(s) Enteral Tube daily  formoterol for nebulization 20 MICROGram(s) Nebulizer two times a day  gabapentin   Solution 300 milliGRAM(s) Oral every 12 hours  heparin  Infusion.  Unit(s)/Hr (10 mL/Hr) IV Continuous <Continuous>  hydrocortisone sodium succinate Injectable 25 milliGRAM(s) IV Push every 6 hours  insulin lispro (HumaLOG) corrective regimen sliding scale   SubCutaneous every 6 hours  levothyroxine Injectable 60 MICROGram(s) IV Push at bedtime  Medical Marijuana Awake Oil 2 milliLiter(s),Medical Marijuana Awake Oil 2 milliLiter(s) 2 milliLiter(s) Enteral Tube <User Schedule>  Medical Marijuana Calm Oil 2 milliLiter(s),Medical [place] Calm Oil 2 milliLiter(s) 2 milliLiter(s) Enteral Tube <User Schedule>  Medical Marijuana [place] Oil 2 milliLiter(s),Medical Marijuana [place] Oil 2 milliLiter(s) 2 milliLiter(s) Enteral Tube <User Schedule>  meropenem  IVPB 500 milliGRAM(s) IV Intermittent every 12 hours  multivitamin/minerals/iron Oral Solution (CENTRUM) 15 milliLiter(s) Oral daily  norepinephrine Infusion 0.01 MICROgram(s)/kG/Min (0.673 mL/Hr) IV Continuous <Continuous>  QUEtiapine 25 milliGRAM(s) Oral at bedtime  zinc sulfate 220 milliGRAM(s) Oral daily    MEDICATIONS  (PRN):  acetaminophen  Suppository .. 650 milliGRAM(s) Rectal every 6 hours PRN Temp greater or equal to 38C (100.4F), Mild Pain (1 - 3)  carboxymethylcellulose 0.5% (Preservative-Free) Ophthalmic Solution 1 Drop(s) Both EYES three times a day PRN dry eyes  dextrose 40% Gel 15 Gram(s) Oral once PRN Blood Glucose LESS THAN 70 milliGRAM(s)/deciliter  glucagon  Injectable 1 milliGRAM(s) IntraMuscular once PRN Glucose LESS THAN 70 milligrams/deciliter  heparin  Injectable 4000 Unit(s) IV Push every 6 hours PRN For aPTT less than 40  heparin  Injectable 2000 Unit(s) IV Push every 6 hours PRN For aPTT between 40 - 57  nystatin Powder 1 Application(s) Topical three times a day PRN under breasts      ---___---___---___---___---___---  REVIEW OF SYSTEM:    unable to obtain     ---___---___---___---___---___---  VITAL SIGNS:  69y , CAPILLARY BLOOD GLUCOSE      POCT Blood Glucose.: 163 mg/dL (2019 11:17)    T(C): 36.6 (19 @ 08:00), Max: 36.6 (19 @ 08:00)  HR: 125 (19 @ 12:20) (81 - 127)  BP: 145/77 (19 @ 10:00) (113/54 - 161/90)  RR: 19 (19 @ 12:20) (13 - 27)  SpO2: 95% (- @ 12:20) (95% - 100%)  ---___---___---___---___---___---  PHYSICAL EXAM:    GEN: A&O X 0 , NAD , comfortable  HEENT: NCAT, PERRL, MMM, hearing intact  Neck: supple , no JVD  CVS: S1S2 , regular , No M/R/G appreciated  PULM: CTA B/L,  no W/R/R appreciated  ABD.: soft. non tender, non distended,  bowel sounds present peg noted   Extrem: intact pulses , no edema  chronic contractures noted   Derm: Nsacral decbitus noted   PSYCH : normal mood,  no delusion not anxious     ---___---___---___---___---___---            LAB AND IMAGIN.6    29.8  )-----------( 468      ( 2019 03:41 )             27.5               07-14    131<L>  |  93<L>  |  100<H>  ----------------------------<  136<H>  4.4   |  19<L>  |  2.62<H>    Ca    10.3      2019 03:41  Phos  5.2     07-14  Mg     2.4     07-14    TPro  5.5<L>  /  Alb  2.2<L>  /  TBili  0.2  /  DBili  x   /  AST  10  /  ALT  11  /  AlkPhos  100  07-14    PT/INR - ( 2019 03:41 )   PT: 12.6 sec;   INR: 1.10 ratio         PTT - ( 2019 10:16 )  PTT:80.2 sec            CARDIAC MARKERS ( 2019 05:37 )  x     / x     / 80 U/L / x     / 11.1 ng/mL            ABG - ( 2019 09:51 )  pH, Arterial: 7.39  pH, Blood: x     /  pCO2: 35    /  pO2: 130   / HCO3: 20    / Base Excess: -3.7  /  SaO2: 99                Urinalysis Basic - ( 2019 05:37 )    Color: Yellow / Appearance: Slightly Turbid / S.016 / pH: x  Gluc: x / Ketone: Negative  / Bili: Negative / Urobili: Negative   Blood: x / Protein: 300 mg/dL / Nitrite: Negative   Leuk Esterase: Large / RBC: 10-25 /hpf / WBC >50 /HPF   Sq Epi: x / Non Sq Epi: 0 / Bacteria: Moderate        [All pertinent / recent Imaging reviewed]         ---___---___---___---___---___---___ ---___---___---___---___---                         A S S E S S M E N T   A N D   P L A N :      HEALTH ISSUES - PROBLEM Dx:      libia still rising  oniv fluids   follow labs  chronic pain on medical marijuana and neurontin  dysphagia on peg feeds  continue   contractues  suppoortive care   wound care  for sacral decubitus  budenoside for asthma     overall prognosis poor                  -GI/DVT Prophylaxis.    --------------------------------------------  Case discussed with     Education given on   ___________________________  Thank you,  Deniz Bolden  [phone]

## 2019-07-14 NOTE — PROGRESS NOTE ADULT - ASSESSMENT
69 year old female with multiple prolonged hospitalizations, known to Dr Francis from our office, with PMH of Advanced dementia (nonverbal, dysphagia, with PEG tube, functional quadriplegic, chronic Blackman catheter) sacral state 4 pressure ulcer, heel pressure ulcers, asthma on chronic prednisone, HLD, CVA, s is, 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, left knee fracture, DM, RLE DVT, returned to  [facility] ED  with PEG tube being dislodged. since admission, went to IR to have PEG replaced.  TRANG and hyperkalemia.    1. TRANG likely multifactorial with vancomycin clearly a contributing factor.  Creatinine still rising as expected with high vanco levels as well.   2. Hyponatremia from TRANG, mild but trending lower  3. Hypercalcemia, stable   4. Hyperkalemia, resolved with change in feeds   5. Anemia. Hgb improved.     RECOMMEND:  - A/W albumin as ordered  - Consider loop diuretic to augment urine output  - Continue to hold Vanco and trend level  - Continue TF with Nephro till the creatinine improves    - BMP daily  - Monitor I's and O's and daily weights  - Plan as per MICU team and ID    Discussed case with renal attending Dr. Nielsen.

## 2019-07-14 NOTE — PROGRESS NOTE ADULT - ASSESSMENT
____________________________________________________________                                                   ASSESSMENT  69F with PMHx of severe dementia (AOx0), nonverbal, and bedbound, asthma on chronic steroids, CVA, HLD, 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 Now stable off pressors, on NC.    #Neuro  Baseline AOx0, nonverbal, bedbound  Currently not alert, likely encephalopathic 2/2 sepsis   F/u TSH  If no improvement, will consider CTH given unable to assess neuro exam   Klonopin and seroquel nightly   Gabapentin TID     #CV  Shock likely 2/2 sepsis, however differential also includes adrenal insufficiency given chronic daily prednisone use, and PE given not on full dose AC since hospitalization    Unable to obtain cortisol level prior to hydrocortisone   Continue stress dose steroids, now hydrocortison 25mg q8hrs  Patient hx of RLE DVT on Eliquis outpatient, stopped on 6/26 with Hg drop to 7.1 received 1 unit PRBC and responded appropriately without overt signs of bleeding   Will get LE dopplers to assess LE     #Pulm  Patient hypoxic to 80s during RRT.   ABG on 100% FIO2 PO2 161 - transition from nonbreather to high flow   Differential includes pulmonary edema, infectious etiology, ?PCP  Wean O2 as tolerated  Now on stable on NC      #GI  On TF  Albumin 1.4, severely malnourished     #Endo  On chronic steroids at home, continue stress dose steroids  F/u TSH  Unable to obtain am cortisol prior to steroid administration     #ID  Patient with hx of multiple infections - including MRSA hip and aspiration PNA   Start Meropenem, f/u Vancomycin level in AM to dose Vanc  UA+, f/u UCx, blood cx sent in AM, sputum cx   Sacral decibutus ulcers to not appear to be infected   Wound care following   ESR 91  Patient not on PCP prophylaxis?, will discuss with ID in AM    #Renal   Baseline SCr 0.3, now with TRANG, worsening with minimal UOP  Likely hypovolemic and intravascularly depleted  Will give 250cc IV albumin and assess response  F/u urine studies  Blackman exchanged on 7/13  UCX NGTD      #Heme  Anemic at baseline, normocytic, currently at baseline     #DVT PPX  HSQ

## 2019-07-14 NOTE — PROGRESS NOTE ADULT - SUBJECTIVE AND OBJECTIVE BOX
[facility]-Lolita Pal NP- PROGRESS NOTE    Patient seen and examined with daughter at the bedside. Taken off high flow nasal O2 and placed on nasal cannula. Daughter at the bedside.      Hospital Medications:   MEDICATIONS  (STANDING):  artificial tears (preservative free) Ophthalmic Solution 1 Drop(s) Both EYES four times a day  ascorbic acid 500 milliGRAM(s) Oral daily  BACItracin   Ointment 1 Application(s) Topical two times a day  chlorhexidine 2% Cloths 1 Application(s) Topical daily  chlorhexidine 4% Liquid 1 Application(s) Topical <User Schedule>  clonazePAM  Tablet 1 milliGRAM(s) Oral at bedtime  Dakins Solution - 1/4 Strength 1 Application(s) Topical two times a day  dextrose 5%. 1000 milliLiter(s) (50 mL/Hr) IV Continuous <Continuous>  dextrose 50% Injectable 12.5 Gram(s) IV Push once  dextrose 50% Injectable 25 Gram(s) IV Push once  dextrose 50% Injectable 25 Gram(s) IV Push once  famotidine    Tablet 20 milliGRAM(s) Oral daily  ferrous    sulfate Liquid 300 milliGRAM(s) Enteral Tube daily  formoterol for nebulization 20 MICROGram(s) Nebulizer two times a day  gabapentin   Solution 300 milliGRAM(s) Oral every 12 hours  heparin  Infusion.  Unit(s)/Hr (10 mL/Hr) IV Continuous <Continuous>  hydrocortisone sodium succinate Injectable 25 milliGRAM(s) IV Push every 6 hours  insulin lispro (HumaLOG) corrective regimen sliding scale   SubCutaneous every 6 hours  levothyroxine Injectable 60 MICROGram(s) IV Push at bedtime  Medical Marijuana Awake Oil 2 milliLiter(s),Medical Marijuana Awake Oil 2 milliLiter(s) 2 milliLiter(s) Enteral Tube <User Schedule>  Medical Marijuana Calm Oil 2 milliLiter(s),Medical Marinjuana Calm Oil 2 milliLiter(s) 2 milliLiter(s) Enteral Tube <User Schedule>  Medical Marijuana [place] Oil 2 milliLiter(s),Medical Marijuana [place] Oil 2 milliLiter(s) 2 milliLiter(s) Enteral Tube <User Schedule>  meropenem  IVPB 500 milliGRAM(s) IV Intermittent every 12 hours  multivitamin/minerals/iron Oral Solution (CENTRUM) 15 milliLiter(s) Oral daily  norepinephrine Infusion 0.01 MICROgram(s)/kG/Min (0.673 mL/Hr) IV Continuous <Continuous>  QUEtiapine 25 milliGRAM(s) Oral at bedtime  zinc sulfate 220 milliGRAM(s) Oral daily      REVIEW OF SYSTEMS:  UTO due to patient's condition    VITALS:  T(F): 98.1 (19 @ 12:00), Max: 98.1 (19 @ 12:00)  HR: 124 (19 @ 14:00)  BP: 135/79 (19 @ 14:00)  RR: 19 (19 @ 14:00)  SpO2: 98% (19 @ 14:00)  Wt(kg): --     @ 07:  -   @ 07:00  --------------------------------------------------------  IN: 317.2 mL / OUT: 200 mL / NET: 117.2 mL     @ 07:  -   @ 16:21  --------------------------------------------------------  IN: 250 mL / OUT: 0 mL / NET: 250 mL          PHYSICAL EXAM:  Constitutional: NAD  HEENT: PERRL  Neck: No JVD  Respiratory: CTAB, no wheezes, rales or rhonchi  Cardiovascular: S1, S2, RRR  Gastrointestinal: BS+, soft, NT/ND, + PEG  Extremities:+Dustin Upper and Lower edema  Neurological: Opens eyes  Psychiatric: Calm  : + gavin (chronic)  Skin: skin breakdown    LABS:  Blood Gas Arterial - Lytes,H+H,iCa,Lact: Performed In Lab ( @ 09:51)  Blood Gas Arterial - Calcium, Ionized: 1.41 mmoL/L ( 09:51)  Blood Gas Arterial - Chloride: 102 mmoL/L ( 09:51)  Blood Gas Arterial - Glucose: 184 mg/dL ( 09:51)  Blood Gas Arterial - Lytes,H+H,iCa,Lact: Performed In Lab ( @ 02:33)  Blood Gas Source Arterial: Arterial ( @ 02:33)  Blood Gas Arterial - Calcium, Ionized: 1.43 mmoL/L ( @ 02:33)  Blood Gas Arterial - Chloride: 101 mmoL/L ( @ 02:33)  Blood Gas Arterial - Glucose: 112 mg/dL ( @ 02:33)          131<L>  |  93<L>  |  100<H>  ----------------------------<  136<H>  4.4   |  19<L>  |  2.62<H>      133<L>  |  94<L>  |  92<H>  ----------------------------<  134<H>  4.1   |  21<L>  |  2.40<H>      129<L>  |  95<L>  |  92<H>  ----------------------------<  119<H>  3.7   |  21<L>  |  2.31<H>    Ca    10.3      2019 03:41  Phos  5.2     07-14  Mg     2.4     07-14  Mg     2.0     -    TPro  5.5<L>  /  Alb  2.2<L>  /  TBili  0.2  /  DBili  x   /  AST  10  /  ALT  11  /  AlkPhos  100  07-  TPro  4.2<L>  /  Alb  1.4<L>  /  TBili  0.1<L>  /  DBili  x   /  AST  14  /  ALT  8<L>  /  AlkPhos  78  07-13                            9.6    29.8  )-----------( 468      ( 2019 03:41 )             27.5       Urine Studies:  Urinalysis Basic - ( 2019 05:37 )    Color: Yellow / Appearance: Slightly Turbid / S.016 / pH: x  Gluc: x / Ketone: Negative  / Bili: Negative / Urobili: Negative   Blood: x / Protein: 300 mg/dL / Nitrite: Negative   Leuk Esterase: Large / RBC: 10-25 /hpf / WBC >50 /HPF   Sq Epi: x / Non Sq Epi: 0 / Bacteria: Moderate      Sodium, Random Urine: 41 mmol/L ( @ 07:46)  Osmolality, Random Urine: 269 mos/kg ( @ 07:46)

## 2019-07-14 NOTE — PROGRESS NOTE ADULT - SUBJECTIVE AND OBJECTIVE BOX
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   Large sacral decubitus ulceration with concern for sacral osteomyelitis.    : stress dose steroids, changed to 50mg [id number]. on vanc by level and meropenem. hep gtt started for DVT. vanc level 20.3 today, holding vanc.    TSH 9.23  Klonopin 1mg given  at bedtime   Continuous tube feeds started last night   ____________________________________________________________                                INTERVAL HISTORY AND SUBJECTIVE  -24 (20d)    Orders placed  hydrocortisone sodium succinate Injectable [active]  heparin  Infusion. [active]  heparin  Injectable [active]  heparin  Injectable [active]  formoterol for nebulization [active]  levothyroxine Injectable [active]  freetext medication  - [active]  freetext medication  - [active]  freetext medication  - [active]  clonazePAM  Tablet [active]    ____________________________________________________________                                                         VITALS    T(F): 97.3, Max: 97.5 (19 @ 01:00)   HR: 122 (104 - 127)  BP: 145/81 (107/58 - 161/90)     RR: 17 (3 - 36)   SpO2: 96%   Weight (kg): 55.2 (-)  ABG - ( 2019 09:51 )  pH, Arterial: 7.39  pH, Blood: x     /  pCO2: 35    /  pO2: 130   / HCO3: 20    / Base Excess: -3.7  /  SaO2: 99        ____________________________________________________________                                             PHYSICAL EXAMINATION  General: Awake, alert, conversant  HEENT: PERRLA, MMM. No cervical/supraclavicular LAD. No scleral icterus. No thyromegaly.  Pulm: LCTAB. No increased work of breathing on RA. No cyanosis.   CV: RRR. No m/r/g.   Abdomen: Soft. Non tender. Non distended. No HSM.   : Voiding freely. No CVAT.  Extremities: Symmetric. Warm. No edema. No rashes.   Neuro: Face symmetric.  AOx3. Moves all four extremities on command. Mentating well.   Psych: Linear though process. Normal cognition. Good insight.   ____________________________________________________________                                             LABORATORY STUDIES  WBC Count: 29.8 <H> ( @ 03:41)  WBC Count: 24.39 <H> ( @ 10:58)  WBC Count: 15.1 <H> ( @ 02:48)    Hemoglobin: 9.6 <L> ( @ 03:41)  Hemoglobin: 8.8 <L> ( @ 10:58)  Hemoglobin: 8.0 <L> ( @ 02:48)    Platelet Count - Automated: 468 <H> ( @ 03:41)  Platelet Count - Automated: 432 <H> ( @ 10:58)  Platelet Count - Automated: 392 ( @ 02:48)    Sodium, Serum: 131 <L> ( @ 03:41)  Sodium, Serum: 133 <L> ( @ 05:37)  Sodium, Serum: 129 <L> ( 02:48)    Potassium, Serum: 4.4 ( 03:41)  Potassium, Serum: 4.1 ( 05:37)    Chloride, Serum: 93 mmol/L ( 03:41)  Chloride, Serum: 94 mmol/L ( 05:37)    Carbon Dioxide, Serum: 19 <L> (:41)  Carbon Dioxide, Serum: 21 <L> ( 05:37)  Carbon Dioxide, Serum: 21 <L> ( 02:48)    Blood Urea Nitrogen, Serum: 100 <H> ( 03:41)  Blood Urea Nitrogen, Serum: 92 <H> ( 05:37)  Blood Urea Nitrogen, Serum: 92 <H> ( 02:48)    Creatinine, Serum: 2.62 <H> ( 03:41)  Creatinine, Serum: 2.40 <H> ( 05:37)  Creatinine, Serum: 2.31 <H> (:48)  Albumin, Serum: 2.2 <L> ( 03:41)  Albumin, Serum: 1.4 <L> ( 02:48)    Bilirubin Total, Serum: 0.2 ( 03:41)  Bilirubin Total, Serum: 0.1 <L> ( 02:48)      Aspartate Aminotransferase (AST/SGOT): 10 (:41)  Aspartate Aminotransferase (AST/SGOT): 14 (:48)    Alanine Aminotransferase (ALT/SGPT): 11 ( 03:41)  Alanine Aminotransferase (ALT/SGPT): 8 <L> ( 02:48)      Urinalysis Basic - ( 2019 05:37 )    Color: Yellow / Appearance: Slightly Turbid / S.016 / pH: x  Gluc: x / Ketone: Negative  / Bili: Negative / Urobili: Negative   Blood: x / Protein: 300 mg/dL / Nitrite: Negative   Leuk Esterase: Large / RBC: 10-25 /hpf / WBC >50 /HPF   Sq Epi: x / Non Sq Epi: 0 / Bacteria: Moderate        ____________________________________________________________                                             DIAGNOSTIC STUDIES        ____________________________________________________________                                                   ASSESSMENT  #Neuro  Baseline AOx0, nonverbal, bedbound  Currently not alert, likely encephalopathic 2/2 sepsis   F/u TSH  If no improvement, will consider CTH given unable to assess neuro exam   Pt's home med: gabapentin and Klonopin--held in the setting of mental status    #CV  Shock likely 2/2 sepsis, however differential also includes adrenal insufficiency given chronic daily prednisone use, and PE given not on full dose AC since hospitalization    Unable to obtain cortisol level prior to hydrocortisone   Continue stress dose steroids  F/u troponin and EKG, proBNP  Patient hx of RLE DVT on Eliquis outpatient, stopped on  with Hg drop to 7.1 received 1 unit PRBC and responded appropriately without overt signs of bleeding   Will get LE dopplers to assess LE   Consider heparin gtt    #Pulm  Patient hypoxic to 80s during RRT  ABG on 100% FIO2 PO2 161 - transition from nonbreather to high flow   Differential includes pulmonary edema, infectious etiology, ?PCP  Wean O2 as tolerated  POCUS diffuse B lines, will hold off on further IVF     #GI  NPO for now  Albumin 1.4, severely malnourished     #Endo  On chronic steroids at home, continue stress dose steroids  F/u TSH  Unable to obtain am cortisol prior to steroid administration     #ID  Patient with hx of multiple infections - including MRSA hip and aspiration PNA   Start Meropenem, f/u Vancomycin level in AM to dose Vanc  UA+, f/u UCx, blood cx sent in AM, sputum cx   Sacral decibutus ulcers to not appear to be infected   Wound care following   ESR 91  Patient not on PCP prophylaxis?, will discuss with ID in AM    #Renal   Baseline SCr 0.3, now with TRANG  BUN elevated to 92, ?uremic encephalopathy   F/u urine studies  Blackman exchanged on  - UA +, f/u UCx  Hyponatremic - f/u urine studies, urine osmoles, serum osmoles     #Heme  Anemic at baseline, normocytic, currently at baseline     #DVT PPX  HSQ            Attending Attestation:   69F Hx Steroid dependent Bronchial Asthma, Chronic MRSA Rt Hip Prosthesis s/p non-removable Spacer, CVA, Dementia, A&Ox0, Bedbound, Functional Quadriplegia, Nonverbal, Dysphagia, PEG, Rt LE-DVT on AC, Presacral Decubital Ulcer Stage IV, Multiple UTIs, Aspiration PNA, Intubations, prolonged hospital course, admitted for PEG Revision in RRT this AM  with Altered Mental Status, Hypoxic Respiratory Failure and Septic Shock.  - Admit to ICU for close cardiopulmonary monitoring and neuro status evaluation   - Hypoxic Respiratory Failure on AFMO2 vs. BiPAP support   - Sever Sepsis and Shock starting Pressor to maintain MAP > 65 mmHg in HFrEF setting   - NPO except Medications via PEG in next 24 Hr and reassess   - Acute Renal Failure on Chronic Blackman complicated by Urosepsis   - Families at bedside requested full CODE but intubation as last resort ____________________________________________________________                                INTERVAL HISTORY AND SUBJECTIVE  - (20d)  Started on  Klonopin 1mg last night.    Continuous tube feeds started last night.   states she is more      ____________________________________________________________                                                         VITALS    T(F): 97.3, Max: 97.5 (19 @ 01:00)   HR: 122 (104 - 127)  BP: 145/81 (107/58 - 161/90)     RR: 17 (3 - 36)   SpO2: 96%   Weight (kg): 55.2 (07-13)  ABG - ( 2019 09:51 )  pH, Arterial: 7.39  pH, Blood: x     /  pCO2: 35    /  pO2: 130   / HCO3: 20    / Base Excess: -3.7  /  SaO2: 99        ____________________________________________________________                                             PHYSICAL EXAMINATION  General: Awake, alert, conversant  HEENT: PERRLA, MMM. No cervical/supraclavicular LAD. No scleral icterus. No thyromegaly.  Pulm: LCTAB. No increased work of breathing on RA. No cyanosis.   CV: RRR. No m/r/g.   Abdomen: Soft. Non tender. Non distended. No HSM.   : Voiding freely. No CVAT.  Extremities: Symmetric. Warm. No edema. No rashes.   Neuro: Face symmetric.  AOx3. Moves all four extremities on command. Mentating well.   Psych: Linear though process. Normal cognition. Good insight.   ____________________________________________________________                                             LABORATORY STUDIES  WBC Count: 29.8 <H> ( @ 03:41)  WBC Count: 24.39 <H> ( @ 10:58)  WBC Count: 15.1 <H> ( @ 02:48)    Hemoglobin: 9.6 <L> ( @ 03:41)  Hemoglobin: 8.8 <L> ( @ 10:58)  Hemoglobin: 8.0 <L> ( 02:48)    Platelet Count - Automated: 468 <H> ( 03:41)  Platelet Count - Automated: 432 <H> ( 10:58)  Platelet Count - Automated: 392 ( 02:48)    Sodium, Serum: 131 <L> ( 03:41)  Sodium, Serum: 133 <L> ( 05:37)  Sodium, Serum: 129 <L> ( 02:48)    Potassium, Serum: 4.4 ( 03:41)  Potassium, Serum: 4.1 ( 05:37)    Chloride, Serum: 93 mmol/L ( 03:41)  Chloride, Serum: 94 mmol/L ( @ 05:37)    Carbon Dioxide, Serum: 19 <L> ( 03:41)  Carbon Dioxide, Serum: 21 <L> ( 05:37)  Carbon Dioxide, Serum: 21 <L> ( 02:48)    Blood Urea Nitrogen, Serum: 100 <H> ( 03:41)  Blood Urea Nitrogen, Serum: 92 <H> ( @ 05:37)  Blood Urea Nitrogen, Serum: 92 <H> ( 02:48)    Creatinine, Serum: 2.62 <H> ( 03:41)  Creatinine, Serum: 2.40 <H> ( @ 05:37)  Creatinine, Serum: 2.31 <H> ( 02:48)  Albumin, Serum: 2.2 <L> ( 03:41)  Albumin, Serum: 1.4 <L> ( @ 02:48)    Bilirubin Total, Serum: 0.2 ( @ 03:41)  Bilirubin Total, Serum: 0.1 <L> ( @ 02:48)      Aspartate Aminotransferase (AST/SGOT): 10 ( @ 03:41)  Aspartate Aminotransferase (AST/SGOT): 14 ( @ 02:48)    Alanine Aminotransferase (ALT/SGPT): 11 ( @ 03:41)  Alanine Aminotransferase (ALT/SGPT): 8 <L> ( @ 02:48)      Urinalysis Basic - ( 2019 05:37 )    Color: Yellow / Appearance: Slightly Turbid / S.016 / pH: x  Gluc: x / Ketone: Negative  / Bili: Negative / Urobili: Negative   Blood: x / Protein: 300 mg/dL / Nitrite: Negative   Leuk Esterase: Large / RBC: 10-25 /hpf / WBC >50 /HPF   Sq Epi: x / Non Sq Epi: 0 / Bacteria: Moderate        ____________________________________________________________                                             DIAGNOSTIC STUDIES        ____________________________________________________________                                                   ASSESSMENT  #Neuro  Baseline AOx0, nonverbal, bedbound  Currently not alert, likely encephalopathic 2/2 sepsis   F/u TSH  If no improvement, will consider CTH given unable to assess neuro exam   Pt's home med: gabapentin and Klonopin--held in the setting of mental status    #CV  Shock likely 2/2 sepsis, however differential also includes adrenal insufficiency given chronic daily prednisone use, and PE given not on full dose AC since hospitalization    Unable to obtain cortisol level prior to hydrocortisone   Continue stress dose steroids  F/u troponin and EKG, proBNP  Patient hx of RLE DVT on Eliquis outpatient, stopped on  with Hg drop to 7.1 received 1 unit PRBC and responded appropriately without overt signs of bleeding   Will get LE dopplers to assess LE   Consider heparin gtt    #Pulm  Patient hypoxic to 80s during RRT  ABG on 100% FIO2 PO2 161 - transition from nonbreather to high flow   Differential includes pulmonary edema, infectious etiology, ?PCP  Wean O2 as tolerated  POCUS diffuse B lines, will hold off on further IVF     #GI  NPO for now  Albumin 1.4, severely malnourished     #Endo  On chronic steroids at home, continue stress dose steroids  F/u TSH  Unable to obtain am cortisol prior to steroid administration     #ID  Patient with hx of multiple infections - including MRSA hip and aspiration PNA   Start Meropenem, f/u Vancomycin level in AM to dose Vanc  UA+, f/u UCx, blood cx sent in AM, sputum cx   Sacral decibutus ulcers to not appear to be infected   Wound care following   ESR 91  Patient not on PCP prophylaxis?, will discuss with ID in AM    #Renal   Baseline SCr 0.3, now with TRANG  BUN elevated to 92, ?uremic encephalopathy   F/u urine studies  Blackman exchanged on  - UA +, f/u UCx  Hyponatremic - f/u urine studies, urine osmoles, serum osmoles     #Heme  Anemic at baseline, normocytic, currently at baseline     #DVT PPX  HSQ            Attending Attestation:   69F Hx Steroid dependent Bronchial Asthma, Chronic MRSA Rt Hip Prosthesis s/p non-removable Spacer, CVA, Dementia, A&Ox0, Bedbound, Functional Quadriplegia, Nonverbal, Dysphagia, PEG, Rt LE-DVT on AC, Presacral Decubital Ulcer Stage IV, Multiple UTIs, Aspiration PNA, Intubations, prolonged hospital course, admitted for PEG Revision in RRT this AM  with Altered Mental Status, Hypoxic Respiratory Failure and Septic Shock.  - Admit to ICU for close cardiopulmonary monitoring and neuro status evaluation   - Hypoxic Respiratory Failure on AFMO2 vs. BiPAP support   - Sever Sepsis and Shock starting Pressor to maintain MAP > 65 mmHg in HFrEF setting   - NPO except Medications via PEG in next 24 Hr and reassess   - Acute Renal Failure on Chronic Blackman complicated by Urosepsis   - Families at bedside requested full CODE but intubation as last resort ____________________________________________________________                                INTERVAL HISTORY AND SUBJECTIVE  - (20d)  Started on  Klonopin 1mg last night.    Continuous tube feeds started last night.   states she is more      ____________________________________________________________                                                         VITALS    T(F): 97.3, Max: 97.5 (19 @ 01:00)   HR: 122 (104 - 127)  BP: 145/81 (107/58 - 161/90)     RR: 17 (3 - 36)   SpO2: 96%   Weight (kg): 55.2 (07-13)  ABG - ( 2019 09:51 )  pH, Arterial: 7.39  pH, Blood: x     /  pCO2: 35    /  pO2: 130   / HCO3: 20    / Base Excess: -3.7  /  SaO2: 99        ____________________________________________________________                                             PHYSICAL EXAMINATION  General: Awake, alert, conversant  HEENT: PERRLA, MMM. No cervical/supraclavicular LAD. No scleral icterus. No thyromegaly.  Pulm: LCTAB. No increased work of breathing on RA. No cyanosis.   CV: RRR. No m/r/g.   Abdomen: Soft. Non tender. Non distended. No HSM.   : Voiding freely. No CVAT.  Extremities: Symmetric. Warm. No edema. No rashes.   Neuro: Face symmetric.  AOx3. Moves all four extremities on command. Mentating well.   Psych: Linear though process. Normal cognition. Good insight.       ____________________________________________________________                                             LABORATORY STUDIES  WBC Count: 29.8 <H> ( @ 03:41)  WBC Count: 24.39 <H> ( @ 10:58)  General: lethargic  HEENT: Normocephalic, PERRLA, periorbital rash on L eye  Neck: supple  Respiratory: Clear to auscultation bilaterally; No wheeze, ronchi or ral  Cardiovascular: Regular rate and rhythm; No murmurs, rubs, or gallops  Abdomen: Soft, Nontender, Nondistended; Bowel sounds present  Extremities: bilateral peripheral edema, LLE  Skin: sacral decubitus   Neurological: [id number] Count: 15.1 <H> ( @ 02:48)    Hemoglobin: 9.6 <L> ( @ 03:41)  Hemoglobin: 8.8 <L> ( @ 10:58)  Hemoglobin: 8.0 <L> ( @ 02:48)    Platelet Count - Automated: 468 <H> ( @ 03:41)  Platelet Count - Automated: 432 <H> ( @ 10:58)  Platelet Count - Automated: 392 ( @ 02:48)    Sodium, Serum: 131 <L> ( @ 03:41)  Sodium, Serum: 133 <L> ( @ 05:37)  Sodium, Serum: 129 <L> ( 02:48)    Potassium, Serum: 4.4 ( 03:41)  Potassium, Serum: 4.1 ( @ 05:37)    Chloride, Serum: 93 mmol/L ( 03:41)  Chloride, Serum: 94 mmol/L ( @ 05:37)    Carbon Dioxide, Serum: 19 <L> ( 03:41)  Carbon Dioxide, Serum: 21 <L> ( @ 05:37)  Carbon Dioxide, Serum: 21 <L> ( @ 02:48)    Blood Urea Nitrogen, Serum: 100 <H> ( 03:41)  Blood Urea Nitrogen, Serum: 92 <H> ( @ 05:37)  Blood Urea Nitrogen, Serum: 92 <H> ( 02:48)    Creatinine, Serum: 2.62 <H> ( 03:41)  Creatinine, Serum: 2.40 <H> ( 05:37)  Creatinine, Serum: 2.31 <H> ( 02:48)  Albumin, Serum: 2.2 <L> ( 03:41)  Albumin, Serum: 1.4 <L> ( 02:48)    Bilirubin Total, Serum: 0.2 ( 03:41)  Bilirubin Total, Serum: 0.1 <L> ( 02:48)      Aspartate Aminotransferase (AST/SGOT): 10 ( 03:41)  Aspartate Aminotransferase (AST/SGOT): 14 ( @ 02:48)    Alanine Aminotransferase (ALT/SGPT): 11 ( 03:41)  Alanine Aminotransferase (ALT/SGPT): 8 <L> ( 02:48)      Urinalysis Basic - ( 2019 05:37 )    Color: Yellow / Appearance: Slightly Turbid / S.016 / pH: x  Gluc: x / Ketone: Negative  / Bili: Negative / Urobili: Negative   Blood: x / Protein: 300 mg/dL / Nitrite: Negative   Leuk Esterase: Large / RBC: 10-25 /hpf / WBC >50 /HPF   Sq Epi: x / Non Sq Epi: 0 / Bacteria: Moderate        ____________________________________________________________                                             DIAGNOSTIC STUDIES        ____________________________________________________________                                                   ASSESSMENT  #Neuro  Baseline AOx0, nonverbal, bedbound  Currently not alert, likely encephalopathic 2/2 sepsis   F/u TSH  If no improvement, will consider CTH given unable to assess neuro exam   Pt's home med: gabapentin and Klonopin--held in the setting of mental status    #CV  Shock likely 2/2 sepsis, however differential also includes adrenal insufficiency given chronic daily prednisone use, and PE given not on full dose AC since hospitalization    Unable to obtain cortisol level prior to hydrocortisone   Continue stress dose steroids  F/u troponin and EKG, proBNP  Patient hx of RLE DVT on Eliquis outpatient, stopped on  with Hg drop to 7.1 received 1 unit PRBC and responded appropriately without overt signs of bleeding   Will get LE dopplers to assess LE   Consider heparin gtt    #Pulm  Patient hypoxic to 80s during RRT  ABG on 100% FIO2 PO2 161 - transition from nonbreather to high flow   Differential includes pulmonary edema, infectious etiology, ?PCP  Wean O2 as tolerated  POCUS diffuse B lines, will hold off on further IVF     #GI  NPO for now  Albumin 1.4, severely malnourished     #Endo  On chronic steroids at home, continue stress dose steroids  F/u TSH  Unable to obtain am cortisol prior to steroid administration     #ID  Patient with hx of multiple infections - including MRSA hip and aspiration PNA   Start Meropenem, f/u Vancomycin level in AM to dose Vanc  UA+, f/u UCx, blood cx sent in AM, sputum cx   Sacral decibutus ulcers to not appear to be infected   Wound care following   ESR 91  Patient not on PCP prophylaxis?, will discuss with ID in AM    #Renal   Baseline SCr 0.3, now with TRANG  BUN elevated to 92, ?uremic encephalopathy   F/u urine studies  Blackman exchanged on  - UA +, f/u UCx  Hyponatremic - f/u urine studies, urine osmoles, serum osmoles     #Heme  Anemic at baseline, normocytic, currently at baseline     #DVT PPX  HSQ            Attending Attestation:   69F Hx Steroid dependent Bronchial Asthma, Chronic MRSA Rt Hip Prosthesis s/p non-removable Spacer, CVA, Dementia, A&Ox0, Bedbound, Functional Quadriplegia, Nonverbal, Dysphagia, PEG, Rt LE-DVT on AC, Presacral Decubital Ulcer Stage IV, Multiple UTIs, Aspiration PNA, Intubations, prolonged hospital course, admitted for PEG Revision in RRT this AM  with Altered Mental Status, Hypoxic Respiratory Failure and Septic Shock.  - Admit to ICU for close cardiopulmonary monitoring and neuro status evaluation   - Hypoxic Respiratory Failure on AFMO2 vs. BiPAP support   - Sever Sepsis and Shock starting Pressor to maintain MAP > 65 mmHg in HFrEF setting   - NPO except Medications via PEG in next 24 Hr and reassess   - Acute Renal Failure on Chronic Blackman complicated by Urosepsis   - Families at bedside requested full CODE but intubation as last resort ____________________________________________________________                                INTERVAL HISTORY AND SUBJECTIVE  -24 (20d)  Started on  Klonopin 1mg last night.    Continuous tube feeds started last night.   states she is more      ____________________________________________________________                                                         VITALS    T(F): 97.3, Max: 97.5 (19 @ 01:00)   HR: 122 (104 - 127)  BP: 145/81 (107/58 - 161/90)     RR: 17 (3 - 36)   SpO2: 96%   Weight (kg): 55.2 (-13)  ABG - ( 2019 09:51 )  pH, Arterial: 7.39  pH, Blood: x     /  pCO2: 35    /  pO2: 130   / HCO3: 20    / Base Excess: -3.7  /  SaO2: 99        ____________________________________________________________                                             PHYSICAL EXAMINATION  General: somnolent. Accompanied by .  HEENT: PERRLA No scleral icterus. No thyromegaly.  Pulm: LCTAB. No increased work of breathing on 6L NC. No cyanosis.   CV: Irregular. No m/r/g.   Abdomen: Soft. Non tender. Non distended. No HSM.   : Voiding freely. No CVAT.  Extremities: BL LE edema. No rashes.   Skin: sacral decubitus   Neurological: AOx0    ____________________________________________________________                                             LABORATORY STUDIES  WBC Count: 29.8 <H> ( @ 03:41)  WBC Count: 24.39 <H> ( @ 10:58)    WBC Count: 15.1 <H> ( @ 02:48)    Hemoglobin: 9.6 <L> ( @ 03:41)  Hemoglobin: 8.8 <L> ( @ 10:58)  Hemoglobin: 8.0 <L> ( @ 02:48)    Platelet Count - Automated: 468 <H> ( 03:41)  Platelet Count - Automated: 432 <H> ( @ 10:58)  Platelet Count - Automated: 392 ( 02:48)    Sodium, Serum: 131 <L> ( 03:41)  Sodium, Serum: 133 <L> ( @ 05:37)  Sodium, Serum: 129 <L> ( 02:48)    Potassium, Serum: 4.4 ( 03:41)  Potassium, Serum: 4.1 ( 05:37)    Chloride, Serum: 93 mmol/L ( 03:41)  Chloride, Serum: 94 mmol/L ( @ 05:37)    Carbon Dioxide, Serum: 19 <L> ( 03:41)  Carbon Dioxide, Serum: 21 <L> ( @ 05:37)  Carbon Dioxide, Serum: 21 <L> ( 02:48)    Blood Urea Nitrogen, Serum: 100 <H> ( 03:41)  Blood Urea Nitrogen, Serum: 92 <H> ( 05:37)  Blood Urea Nitrogen, Serum: 92 <H> ( 02:48)    Creatinine, Serum: 2.62 <H> ( 03:41)  Creatinine, Serum: 2.40 <H> ( 05:37)  Creatinine, Serum: 2.31 <H> ( 02:48)  Albumin, Serum: 2.2 <L> ( 03:41)  Albumin, Serum: 1.4 <L> ( 02:48)    Bilirubin Total, Serum: 0.2 ( 03:41)  Bilirubin Total, Serum: 0.1 <L> ( @ 02:48)      Aspartate Aminotransferase (AST/SGOT): 10 ( @ 03:41)  Aspartate Aminotransferase (AST/SGOT): 14 ( @ 02:48)    Alanine Aminotransferase (ALT/SGPT): 11 ( @ 03:41)  Alanine Aminotransferase (ALT/SGPT): 8 <L> ( @ 02:48)      Urinalysis Basic - ( 2019 05:37 )    Color: Yellow / Appearance: Slightly Turbid / S.016 / pH: x  Gluc: x / Ketone: Negative  / Bili: Negative / Urobili: Negative   Blood: x / Protein: 300 mg/dL / Nitrite: Negative   Leuk Esterase: Large / RBC: 10-25 /hpf / WBC >50 /HPF   Sq Epi: x / Non Sq Epi: 0 / Bacteria: Moderate

## 2019-07-15 LAB
ALBUMIN SERPL ELPH-MCNC: 2 G/DL — LOW (ref 3.3–5)
ALP SERPL-CCNC: 90 U/L — SIGNIFICANT CHANGE UP (ref 40–120)
ALT FLD-CCNC: 11 U/L — SIGNIFICANT CHANGE UP (ref 10–45)
ANION GAP SERPL CALC-SCNC: 18 MMOL/L — HIGH (ref 5–17)
APTT BLD: 73 SEC — HIGH (ref 27.5–36.3)
AST SERPL-CCNC: 12 U/L — SIGNIFICANT CHANGE UP (ref 10–40)
BILIRUB SERPL-MCNC: 0.2 MG/DL — SIGNIFICANT CHANGE UP (ref 0.2–1.2)
BUN SERPL-MCNC: 97 MG/DL — HIGH (ref 7–23)
CALCIUM SERPL-MCNC: 9.7 MG/DL — SIGNIFICANT CHANGE UP (ref 8.4–10.5)
CHLORIDE SERPL-SCNC: 92 MMOL/L — LOW (ref 96–108)
CO2 SERPL-SCNC: 20 MMOL/L — LOW (ref 22–31)
CREAT SERPL-MCNC: 2.78 MG/DL — HIGH (ref 0.5–1.3)
FUNGITELL: 66 PG/ML — SIGNIFICANT CHANGE UP
GLUCOSE BLDC GLUCOMTR-MCNC: 122 MG/DL — HIGH (ref 70–99)
GLUCOSE BLDC GLUCOMTR-MCNC: 138 MG/DL — HIGH (ref 70–99)
GLUCOSE BLDC GLUCOMTR-MCNC: 141 MG/DL — HIGH (ref 70–99)
GLUCOSE BLDC GLUCOMTR-MCNC: 157 MG/DL — HIGH (ref 70–99)
GLUCOSE SERPL-MCNC: 156 MG/DL — HIGH (ref 70–99)
HCT VFR BLD CALC: 24.9 % — LOW (ref 34.5–45)
HGB BLD-MCNC: 8.5 G/DL — LOW (ref 11.5–15.5)
INR BLD: 1.11 RATIO — SIGNIFICANT CHANGE UP (ref 0.88–1.16)
MAGNESIUM SERPL-MCNC: 2.3 MG/DL — SIGNIFICANT CHANGE UP (ref 1.6–2.6)
MCHC RBC-ENTMCNC: 30.4 PG — SIGNIFICANT CHANGE UP (ref 27–34)
MCHC RBC-ENTMCNC: 34.1 GM/DL — SIGNIFICANT CHANGE UP (ref 32–36)
MCV RBC AUTO: 89.2 FL — SIGNIFICANT CHANGE UP (ref 80–100)
PHOSPHATE SERPL-MCNC: 5.5 MG/DL — HIGH (ref 2.5–4.5)
PLATELET # BLD AUTO: 352 K/UL — SIGNIFICANT CHANGE UP (ref 150–400)
POTASSIUM SERPL-MCNC: 4 MMOL/L — SIGNIFICANT CHANGE UP (ref 3.5–5.3)
POTASSIUM SERPL-SCNC: 4 MMOL/L — SIGNIFICANT CHANGE UP (ref 3.5–5.3)
PROT SERPL-MCNC: 5.1 G/DL — LOW (ref 6–8.3)
PROTHROM AB SERPL-ACNC: 12.8 SEC — SIGNIFICANT CHANGE UP (ref 10–12.9)
RBC # BLD: 2.79 M/UL — LOW (ref 3.8–5.2)
RBC # FLD: 13.9 % — SIGNIFICANT CHANGE UP (ref 10.3–14.5)
SODIUM SERPL-SCNC: 130 MMOL/L — LOW (ref 135–145)
VANCOMYCIN TROUGH SERPL-MCNC: 20.5 UG/ML — HIGH (ref 10–20)
WBC # BLD: 24.4 K/UL — HIGH (ref 3.8–10.5)
WBC # FLD AUTO: 24.4 K/UL — HIGH (ref 3.8–10.5)

## 2019-07-15 PROCEDURE — 99233 SBSQ HOSP IP/OBS HIGH 50: CPT

## 2019-07-15 PROCEDURE — 99232 SBSQ HOSP IP/OBS MODERATE 35: CPT

## 2019-07-15 PROCEDURE — 99291 CRITICAL CARE FIRST HOUR: CPT

## 2019-07-15 RX ORDER — HYDROCORTISONE 20 MG
25 TABLET ORAL EVERY 8 HOURS
Refills: 0 | Status: DISCONTINUED | OUTPATIENT
Start: 2019-07-15 | End: 2019-07-17

## 2019-07-15 RX ORDER — SODIUM CHLORIDE 9 MG/ML
1000 INJECTION INTRAMUSCULAR; INTRAVENOUS; SUBCUTANEOUS
Refills: 0 | Status: DISCONTINUED | OUTPATIENT
Start: 2019-07-15 | End: 2019-07-29

## 2019-07-15 RX ORDER — TIZANIDINE 4 MG/1
4 TABLET ORAL
Refills: 0 | Status: DISCONTINUED | OUTPATIENT
Start: 2019-07-15 | End: 2019-08-08

## 2019-07-15 RX ADMIN — FAMOTIDINE 20 MILLIGRAM(S): 10 INJECTION INTRAVENOUS at 12:55

## 2019-07-15 RX ADMIN — Medication 1 APPLICATION(S): at 05:05

## 2019-07-15 RX ADMIN — NYSTATIN CREAM 1 APPLICATION(S): 100000 CREAM TOPICAL at 15:11

## 2019-07-15 RX ADMIN — MEROPENEM 100 MILLIGRAM(S): 1 INJECTION INTRAVENOUS at 17:19

## 2019-07-15 RX ADMIN — Medication 1 APPLICATION(S): at 17:19

## 2019-07-15 RX ADMIN — QUETIAPINE FUMARATE 25 MILLIGRAM(S): 200 TABLET, FILM COATED ORAL at 22:28

## 2019-07-15 RX ADMIN — Medication 60 MICROGRAM(S): at 22:00

## 2019-07-15 RX ADMIN — Medication 1 APPLICATION(S): at 05:04

## 2019-07-15 RX ADMIN — CHLORHEXIDINE GLUCONATE 1 APPLICATION(S): 213 SOLUTION TOPICAL at 06:04

## 2019-07-15 RX ADMIN — ZINC SULFATE TAB 220 MG (50 MG ZINC EQUIVALENT) 220 MILLIGRAM(S): 220 (50 ZN) TAB at 12:57

## 2019-07-15 RX ADMIN — TIZANIDINE 4 MILLIGRAM(S): 4 TABLET ORAL at 21:15

## 2019-07-15 RX ADMIN — CHLORHEXIDINE GLUCONATE 1 APPLICATION(S): 213 SOLUTION TOPICAL at 17:12

## 2019-07-15 RX ADMIN — MEROPENEM 100 MILLIGRAM(S): 1 INJECTION INTRAVENOUS at 05:36

## 2019-07-15 RX ADMIN — Medication 25 MILLIGRAM(S): at 21:59

## 2019-07-15 RX ADMIN — Medication 1: at 00:00

## 2019-07-15 RX ADMIN — GABAPENTIN 300 MILLIGRAM(S): 400 CAPSULE ORAL at 21:15

## 2019-07-15 RX ADMIN — GABAPENTIN 300 MILLIGRAM(S): 400 CAPSULE ORAL at 05:04

## 2019-07-15 RX ADMIN — HEPARIN SODIUM 1000 UNIT(S)/HR: 5000 INJECTION INTRAVENOUS; SUBCUTANEOUS at 00:30

## 2019-07-15 RX ADMIN — Medication 20 MICROGRAM(S): at 05:32

## 2019-07-15 RX ADMIN — Medication 1 DROP(S): at 12:57

## 2019-07-15 RX ADMIN — Medication 25 MILLIGRAM(S): at 15:11

## 2019-07-15 RX ADMIN — Medication 1 DROP(S): at 05:06

## 2019-07-15 RX ADMIN — Medication 300 MILLIGRAM(S): at 12:55

## 2019-07-15 RX ADMIN — Medication 1 DROP(S): at 00:01

## 2019-07-15 RX ADMIN — Medication 1 DROP(S): at 17:23

## 2019-07-15 RX ADMIN — Medication 25 MILLIGRAM(S): at 05:04

## 2019-07-15 RX ADMIN — Medication 1 MILLIGRAM(S): at 22:00

## 2019-07-15 RX ADMIN — Medication 15 MILLILITER(S): at 12:55

## 2019-07-15 RX ADMIN — Medication 500 MILLIGRAM(S): at 12:55

## 2019-07-15 RX ADMIN — Medication 20 MICROGRAM(S): at 18:29

## 2019-07-15 NOTE — PROGRESS NOTE ADULT - SUBJECTIVE AND OBJECTIVE BOX
---___---___---___---___---___---___ ---___---___---___---___---___---___---___---___---                  M E D I C A L   A T T E N D I N G   P R O G R E S S   N O T E  ---___---___---___---___---___---___ ---___---___---___---___---___---___---___---___---        ================================================    ++CHIEF COMPLAINT:   Patient is a 69y old  Female who presents with a chief complaint of PEG tube dislodgement, fever, leukocytosis (15 Jul 2019 16:02)    much improved more awake back to baseline   Gastrostomy malfunction    ---___---___---___---___---___---  PAST MEDICAL / Surgical  HISTORY:  PAST MEDICAL & SURGICAL HISTORY:  Type 2 diabetes mellitus  Dementia  DVT, lower extremity  CVA (cerebral vascular accident)  Fatty pancreas  PNA (pneumonia)  Pulmonary HTN  IGT (impaired glucose tolerance)  Ulcerative colitis  Acid reflux  Anxiety  Depression  Mouth sores  HLD (hyperlipidemia)  Asthma  S/P percutaneous endoscopic gastrostomy (PEG) tube placement: for dysphagia  Humeral head fracture  H/O: hysterectomy  H/O cataract extraction, left  History of knee replacement      ---___---___---___---___---___---  FAMILY HISTORY:   FAMILY HISTORY:  Family history of dementia (Grandparent)  Family history of colon cancer (Grandparent)        ---___---___---___---___---___---  ALLERGIES:   Allergies    ASA; dye contrast (Anaphylaxis)  aspirin (Short breath)  divalproex sodium (Other (Unknown))  Flowers and Plants (Short breath)  Haldol (Other (Unknown))  penicillin (Short breath; Rash)  sulfa drugs (Short breath; Rash)  vancomycin (Rash; Urticaria; Hives)  Xanax (Other (Unknown))    Intolerances        ---___---___---___---___---___---  MEDICATIONS:  MEDICATIONS  (STANDING):  artificial tears (preservative free) Ophthalmic Solution 1 Drop(s) Both EYES four times a day  ascorbic acid 500 milliGRAM(s) Oral daily  BACItracin   Ointment 1 Application(s) Topical two times a day  chlorhexidine 2% Cloths 1 Application(s) Topical daily  chlorhexidine 4% Liquid 1 Application(s) Topical <User Schedule>  clonazePAM  Tablet 1 milliGRAM(s) Oral at bedtime  Dakins Solution - 1/4 Strength 1 Application(s) Topical two times a day  dextrose 5%. 1000 milliLiter(s) (50 mL/Hr) IV Continuous <Continuous>  famotidine    Tablet 20 milliGRAM(s) Oral daily  ferrous    sulfate Liquid 300 milliGRAM(s) Enteral Tube daily  formoterol for nebulization 20 MICROGram(s) Nebulizer two times a day  gabapentin   Solution 300 milliGRAM(s) Oral every 12 hours  heparin  Infusion.  Unit(s)/Hr (10 mL/Hr) IV Continuous <Continuous>  hydrocortisone sodium succinate Injectable 25 milliGRAM(s) IV Push every 8 hours  insulin lispro (HumaLOG) corrective regimen sliding scale   SubCutaneous every 6 hours  levothyroxine Injectable 60 MICROGram(s) IV Push at bedtime  Medical Marijuana Awake Oil 2 milliLiter(s),Medical Marijuana Awake Oil 2 milliLiter(s) 2 milliLiter(s) Enteral Tube <User Schedule>  Medical Marijuana Calm Oil 2 milliLiter(s),Medical [place] Calm Oil 2 milliLiter(s) 2 milliLiter(s) Enteral Tube <User Schedule>  Medical Marijuana [place] Oil 2 milliLiter(s),Medical Marijuana [place] Oil 2 milliLiter(s) 2 milliLiter(s) Enteral Tube <User Schedule>  meropenem  IVPB 500 milliGRAM(s) IV Intermittent every 12 hours  multivitamin/minerals/iron Oral Solution (CENTRUM) 15 milliLiter(s) Oral daily  QUEtiapine 25 milliGRAM(s) Oral at bedtime  sodium chloride 0.9%. 1000 milliLiter(s) (75 mL/Hr) IV Continuous <Continuous>  tiZANidine 4 milliGRAM(s) Oral <User Schedule>  zinc sulfate 220 milliGRAM(s) Oral daily    MEDICATIONS  (PRN):  acetaminophen  Suppository .. 650 milliGRAM(s) Rectal every 6 hours PRN Temp greater or equal to 38C (100.4F), Mild Pain (1 - 3)  carboxymethylcellulose 0.5% (Preservative-Free) Ophthalmic Solution 1 Drop(s) Both EYES three times a day PRN dry eyes  glucagon  Injectable 1 milliGRAM(s) IntraMuscular once PRN Glucose LESS THAN 70 milligrams/deciliter  heparin  Injectable 4000 Unit(s) IV Push every 6 hours PRN For aPTT less than 40  heparin  Injectable 2000 Unit(s) IV Push every 6 hours PRN For aPTT between 40 - 57  nystatin Powder 1 Application(s) Topical three times a day PRN under breasts      ---___---___---___---___---___---  REVIEW OF SYSTEM:    unable to obtain   ---___---___---___---___---___---  VITAL SIGNS:  69y , CAPILLARY BLOOD GLUCOSE      POCT Blood Glucose.: 141 mg/dL (15 Jul 2019 17:25)    T(C): 37 (07-15-19 @ 12:00), Max: 37 (07-15-19 @ 08:00)  HR: 125 (07-15-19 @ 14:00) (101 - 129)  BP: 128/87 (07-15-19 @ 14:00) (119/64 - 147/86)  RR: 17 (07-15-19 @ 14:00) (12 - 24)  SpO2: 100% (07-15-19 @ 14:00) (98% - 100%)  ---___---___---___---___---___---  PHYSICAL EXAM:    GEN: A&O X 0 , NAD , comfortable  HEENT: NCAT, PERRL, MMM, hearing intact  Neck: supple , no JVD  CVS: S1S2 , regular , No M/R/G appreciated  PULM:  decreased breath sounds   ABD.: soft. non tender, non distended,  bowel sounds present peg noted   Extrem: intact pulses , edema to both lower extremities  Derm:  stage four sacral decubitus         ---___---___---___---___---___---            LAB AND IMAGIN.5    24.4  )-----------( 352      ( 15 Jul 2019 00:14 )             24.9               07-15    130<L>  |  92<L>  |  97<H>  ----------------------------<  156<H>  4.0   |  20<L>  |  2.78<H>    Ca    9.7      15 Jul 2019 00:14  Phos  5.5     07-15  Mg     2.3     07-15    TPro  5.1<L>  /  Alb  2.0<L>  /  TBili  0.2  /  DBili  x   /  AST  12  /  ALT  11  /  AlkPhos  90  07-15    PT/INR - ( 15 Jul 2019 00:14 )   PT: 12.8 sec;   INR: 1.11 ratio         PTT - ( 15 Jul 2019 00:14 )  PTT:73.0 sec                            [All pertinent / recent Imaging reviewed]         ---___---___---___---___---___---___ ---___---___---___---___---                         A S S E S S M E N T   A N D   P L A N :      HEALTH ISSUES - PROBLEM Dx:     libia with hyponatremia  reccomendation as per renal   sacral decubitus  continue wound care  agitation  on seroquel and klonopin   early onset dementia  supportive care   chronic pain on medical marijuana and neurontin  -GI/DVT Prophylaxis. continue meds  dm2 from chroic steroid use on riss  continue budesonide for asthma     overall prognosis poor      --------------------------------------------  Case discussed with   Education given on   ___________________________  Thank you,  Deniz Bolden  [phone]

## 2019-07-15 NOTE — PROGRESS NOTE ADULT - SUBJECTIVE AND OBJECTIVE BOX
MICU Transfer Note    Transfer from: MICU    Transfer to: ( x ) Medicine    (  ) Telemetry     (   ) RCU        (    ) Palliative         (   ) Stroke Unit          (   ) __________________    Accepting physician: Dr. 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  Now off pressors from 7/13, on NC.     #Neuro  Baseline AOx0, nonverbal, bedbound  c/w Klonopin and Seroquel nightly   c/w Gabapentin TID     #CV  Currently Hemodynamically stable, off pressors from 7/13  C/w hydrocortisone 25mg q8hrs  Patient hx of RLE DVT on Eliquis outpatient, stopped on 6/26 2/2 drop in Hgb received 1 unit PRBC     #Pulm  Patient hypoxic to 80s during RRT.   Now on stable on NC    #GI  On TF, tolerating good     #Endo  On chronic steroids at home, continue hydrocortisone    #ID  Patient with hx of multiple infections - including MRSA hip and aspiration PNA   On Meropenem & Vancomycin f/u Vancomycin level in AM to dose Vanc  Pt with Sacral decubitus ulcers not appear to be infected, Wound care following      #Renal   Baseline SCr 0.3, now with TRANG, worsening with minimal UOP  Likely hypovolemic and intravascularly depleted  Will give 250cc IV albumin and assess response  Blackman exchanged on 7/13  UCX NGTD      #Heme  Anemic at baseline, normocytic, currently at baseline     #DVT PPX  HSQ      For Followup:          Vital Signs Last 24 Hrs  T(C): 36.4 (15 Jul 2019 00:00), Max: 36.9 (14 Jul 2019 16:00)  T(F): 97.5 (15 Jul 2019 00:00), Max: 98.4 (14 Jul 2019 16:00)  HR: 117 (15 Jul 2019 00:00) (81 - 126)  BP: 120/66 (15 Jul 2019 00:00) (116/57 - 159/81)  BP(mean): 88 (15 Jul 2019 00:00) (81 - 121)  RR: 13 (15 Jul 2019 00:00) (13 - 24)  SpO2: 100% (15 Jul 2019 00:00) (95% - 100%)  I&O's Summary    13 Jul 2019 07:01  -  14 Jul 2019 07:00  --------------------------------------------------------  IN: 317.2 mL / OUT: 200 mL / NET: 117.2 mL    14 Jul 2019 07:01  -  15 Jul 2019 00:41  --------------------------------------------------------  IN: 780 mL / OUT: 210 mL / NET: 570 mL        MEDICATIONS  (STANDING):  artificial tears (preservative free) Ophthalmic Solution 1 Drop(s) Both EYES four times a day  ascorbic acid 500 milliGRAM(s) Oral daily  BACItracin   Ointment 1 Application(s) Topical two times a day  chlorhexidine 2% Cloths 1 Application(s) Topical daily  chlorhexidine 4% Liquid 1 Application(s) Topical <User Schedule>  clonazePAM  Tablet 1 milliGRAM(s) Oral at bedtime  Dakins Solution - 1/4 Strength 1 Application(s) Topical two times a day  dextrose 5%. 1000 milliLiter(s) (50 mL/Hr) IV Continuous <Continuous>  famotidine    Tablet 20 milliGRAM(s) Oral daily  ferrous    sulfate Liquid 300 milliGRAM(s) Enteral Tube daily  formoterol for nebulization 20 MICROGram(s) Nebulizer two times a day  gabapentin   Solution 300 milliGRAM(s) Oral every 12 hours  heparin  Infusion.  Unit(s)/Hr (10 mL/Hr) IV Continuous <Continuous>  hydrocortisone sodium succinate Injectable 25 milliGRAM(s) IV Push every 6 hours  insulin lispro (HumaLOG) corrective regimen sliding scale   SubCutaneous every 6 hours  levothyroxine Injectable 60 MICROGram(s) IV Push at bedtime  Medical Marijuana Awake Oil 2 milliLiter(s),Medical Marijuana Awake Oil 2 milliLiter(s) 2 milliLiter(s) Enteral Tube <User Schedule>  Medical Marijuana Calm Oil 2 milliLiter(s),Medical [place] Calm Oil 2 milliLiter(s) 2 milliLiter(s) Enteral Tube <User Schedule>  Medical Marijuana [place] Oil 2 milliLiter(s),Medical Marijuana [place] Oil 2 milliLiter(s) 2 milliLiter(s) Enteral Tube <User Schedule>  meropenem  IVPB 500 milliGRAM(s) IV Intermittent every 12 hours  multivitamin/minerals/iron Oral Solution (CENTRUM) 15 milliLiter(s) Oral daily  norepinephrine Infusion 0.01 MICROgram(s)/kG/Min (0.673 mL/Hr) IV Continuous <Continuous>  QUEtiapine 25 milliGRAM(s) Oral at bedtime  zinc sulfate 220 milliGRAM(s) Oral daily    MEDICATIONS  (PRN):  acetaminophen  Suppository .. 650 milliGRAM(s) Rectal every 6 hours PRN Temp greater or equal to 38C (100.4F), Mild Pain (1 - 3)  carboxymethylcellulose 0.5% (Preservative-Free) Ophthalmic Solution 1 Drop(s) Both EYES three times a day PRN dry eyes  glucagon  Injectable 1 milliGRAM(s) IntraMuscular once PRN Glucose LESS THAN 70 milligrams/deciliter  heparin  Injectable 4000 Unit(s) IV Push every 6 hours PRN For aPTT less than 40  heparin  Injectable 2000 Unit(s) IV Push every 6 hours PRN For aPTT between 40 - 57  nystatin Powder 1 Application(s) Topical three times a day PRN under breasts        LABS                                            8.5                   Neurophils% (auto):   x      (07-15 @ 00:14):    24.4 )-----------(352          Lymphocytes% (auto):  x                                             24.9                   Eosinphils% (auto):   x        Manual%: Neutrophils x    ; Lymphocytes x    ; Eosinophils x    ; Bands%: x    ; Blasts x                                    131    |  93     |  100                 Calcium: 10.3  / iCa: x      (07-14 @ 03:41)    ----------------------------<  136       Magnesium: 2.4                              4.4     |  19     |  2.62             Phosphorous: 5.2      TPro  5.5    /  Alb  2.2    /  TBili  0.2    /  DBili  x      /  AST  10     /  ALT  11     /  AlkPhos  100    14 Jul 2019 03:41    ( 07-15 @ 00:14 )   PT: 12.8 sec;   INR: 1.11 ratio  aPTT: 73.0 sec

## 2019-07-15 NOTE — PROGRESS NOTE ADULT - SUBJECTIVE AND OBJECTIVE BOX
Patient is a 69y old  Female who presented with a chief complaint of PEG tube dislodgement, fever, leukocytosis (15 Jul 2019 10:47)      INTERVAL HPI/OVERNIGHT EVENTS:  prior events noted  s/p RRT on 7/13 for hypotension in the SBP 50-60s and hypoxia in the 80s, admitted to MICU for shock requiring IV pressor support. Now stable off pressors, on NC.    on continuous regimen PEG feeds  +stools, loose ~3 stools/day    transferred to MICU     MEDICATIONS  (STANDING):  artificial tears (preservative free) Ophthalmic Solution 1 Drop(s) Both EYES four times a day  ascorbic acid 500 milliGRAM(s) Oral daily  BACItracin   Ointment 1 Application(s) Topical two times a day  chlorhexidine 2% Cloths 1 Application(s) Topical daily  chlorhexidine 4% Liquid 1 Application(s) Topical <User Schedule>  clonazePAM  Tablet 1 milliGRAM(s) Oral at bedtime  Dakins Solution - 1/4 Strength 1 Application(s) Topical two times a day  dextrose 5%. 1000 milliLiter(s) (50 mL/Hr) IV Continuous <Continuous>  famotidine    Tablet 20 milliGRAM(s) Oral daily  ferrous    sulfate Liquid 300 milliGRAM(s) Enteral Tube daily  formoterol for nebulization 20 MICROGram(s) Nebulizer two times a day  gabapentin   Solution 300 milliGRAM(s) Oral every 12 hours  heparin  Infusion.  Unit(s)/Hr (10 mL/Hr) IV Continuous <Continuous>  hydrocortisone sodium succinate Injectable 25 milliGRAM(s) IV Push every 8 hours  insulin lispro (HumaLOG) corrective regimen sliding scale   SubCutaneous every 6 hours  levothyroxine Injectable 60 MICROGram(s) IV Push at bedtime  Medical Marijuana Awake Oil 2 milliLiter(s),Medical Marijuana Awake Oil 2 milliLiter(s) 2 milliLiter(s) Enteral Tube <User Schedule>  Medical Marijuana Calm Oil 2 milliLiter(s),Medical [place] Calm Oil 2 milliLiter(s) 2 milliLiter(s) Enteral Tube <User Schedule>  Medical Marijuana [place] Oil 2 milliLiter(s),Medical Marijuana [place] Oil 2 milliLiter(s) 2 milliLiter(s) Enteral Tube <User Schedule>  meropenem  IVPB 500 milliGRAM(s) IV Intermittent every 12 hours  multivitamin/minerals/iron Oral Solution (CENTRUM) 15 milliLiter(s) Oral daily  QUEtiapine 25 milliGRAM(s) Oral at bedtime  sodium chloride 0.9%. 1000 milliLiter(s) (75 mL/Hr) IV Continuous <Continuous>  tiZANidine 4 milliGRAM(s) Oral <User Schedule>  zinc sulfate 220 milliGRAM(s) Oral daily    MEDICATIONS  (PRN):  acetaminophen  Suppository .. 650 milliGRAM(s) Rectal every 6 hours PRN Temp greater or equal to 38C (100.4F), Mild Pain (1 - 3)  carboxymethylcellulose 0.5% (Preservative-Free) Ophthalmic Solution 1 Drop(s) Both EYES three times a day PRN dry eyes  glucagon  Injectable 1 milliGRAM(s) IntraMuscular once PRN Glucose LESS THAN 70 milligrams/deciliter  heparin  Injectable 4000 Unit(s) IV Push every 6 hours PRN For aPTT less than 40  heparin  Injectable 2000 Unit(s) IV Push every 6 hours PRN For aPTT between 40 - 57  nystatin Powder 1 Application(s) Topical three times a day PRN under breasts      Allergies  ASA; dye contrast (Anaphylaxis)  aspirin (Short breath)  divalproex sodium (Other (Unknown))  Flowers and Plants (Short breath)  Haldol (Other (Unknown))  penicillin (Short breath; Rash)  sulfa drugs (Short breath; Rash)  vancomycin (Rash; Urticaria; Hives)  Xanax (Other (Unknown))      Review of Systems:  unable to obtain from pt    Vital Signs Last 24 Hrs  T(C): 37 (15 Jul 2019 12:00), Max: 37 (15 Jul 2019 08:00)  T(F): 98.6 (15 Jul 2019 12:00), Max: 98.6 (15 Jul 2019 08:00)  HR: 125 (15 Jul 2019 14:00) (101 - 129)  BP: 128/87 (15 Jul 2019 14:00) (119/64 - 147/86)  BP(mean): 104 (15 Jul 2019 14:00) (86 - 110)  RR: 17 (15 Jul 2019 14:00) (12 - 24)  SpO2: 100% (15 Jul 2019 14:00) (98% - 100%)    PHYSICAL EXAM:  Constitutional: frail elderly chronically ill appearing +severe contractures, non verbal  opens eyes.  daughter at bedside  Neck: No JVD  Respiratory: occ rhonchi , decreased BS bases  Cardiovascular: S1 and S2 tachy  Gastrointestinal: BS+, soft, +G tube  high in epigastric area  bumper noted to be at 6-8 cm richard and leakage of feeds from G tube stoma with many pieces of drain sponge gauze at stoma. Area cleaned by me , no bleeding. bumper brought down to skin (between 2 and 4 cm richard on tubing). lightly touching skin and spins freely 360 degrees. no further leakage.   Extremities: +edema +dressings in place  Vascular: 2+ peripheral pulses  Neurological: opens eyes  Psychiatric: non verbal   Skin: anicteric  +skin dressing and heel pad cushions in place    LABS:                        8.5    24.4  )-----------( 352      ( 15 Jul 2019 00:14 )             24.9     07-15    130<L>  |  92<L>  |  97<H>  ----------------------------<  156<H>  4.0   |  20<L>  |  2.78<H>    Ca    9.7      15 Jul 2019 00:14  Phos  5.5     07-15  Mg     2.3     07-15    TPro  5.1<L>  /  Alb  2.0<L>  /  TBili  0.2  /  DBili  x   /  AST  12  /  ALT  11  /  AlkPhos  90  07-15    PT/INR - ( 15 Jul 2019 00:14 )   PT: 12.8 sec;   INR: 1.11 ratio    PTT - ( 15 Jul 2019 00:14 )  PTT:73.0 sec      RADIOLOGY & ADDITIONAL TESTS:  < from: Xray Chest 1 View-PORTABLE IMMEDIATE (07.13.19 @ 02:21) >    FINDINGS/  IMPRESSION:    Left pleural effusion and underlying atelectasis/consolidation is   unchanged. Interstitial edema. No pneumothorax.

## 2019-07-15 NOTE — PROGRESS NOTE ADULT - SUBJECTIVE AND OBJECTIVE BOX
INTERVAL HPI/OVERNIGHT EVENTS: No acute events overnight    SUBJECTIVE: Patient seen and examined at bedside.     OBJECTIVE:    VITAL SIGNS:  ICU Vital Signs Last 24 Hrs  T(C): 36.7 (15 Jul 2019 04:00), Max: 36.9 (14 Jul 2019 16:00)  T(F): 98 (15 Jul 2019 04:00), Max: 98.4 (14 Jul 2019 16:00)  HR: 126 (15 Jul 2019 07:00) (81 - 128)  BP: 147/86 (15 Jul 2019 07:00) (119/64 - 152/75)  BP(mean): 110 (15 Jul 2019 07:00) (86 - 110)  ABP: --  ABP(mean): --  RR: 16 (15 Jul 2019 07:00) (12 - 24)  SpO2: 100% (15 Jul 2019 07:00) (95% - 100%)        07-14 @ 07:01  -  07-15 @ 07:00  --------------------------------------------------------  IN: 1010 mL / OUT: 320 mL / NET: 690 mL      CAPILLARY BLOOD GLUCOSE      POCT Blood Glucose.: 122 mg/dL (15 Jul 2019 05:02)      PHYSICAL EXAM:    General: NAD  HEENT: NC/AT; PERRL, clear conjunctiva  Neck: supple  Respiratory: CTA b/l  Cardiovascular: +S1/S2; RRR  Abdomen: soft, NT/ND; +BS x4  Extremities: WWP, 2+ peripheral pulses b/l; no LE edema  Skin: normal color and turgor; no rash  Neurological:    MEDICATIONS:  MEDICATIONS  (STANDING):  artificial tears (preservative free) Ophthalmic Solution 1 Drop(s) Both EYES four times a day  ascorbic acid 500 milliGRAM(s) Oral daily  BACItracin   Ointment 1 Application(s) Topical two times a day  chlorhexidine 2% Cloths 1 Application(s) Topical daily  chlorhexidine 4% Liquid 1 Application(s) Topical <User Schedule>  clonazePAM  Tablet 1 milliGRAM(s) Oral at bedtime  Dakins Solution - 1/4 Strength 1 Application(s) Topical two times a day  dextrose 5%. 1000 milliLiter(s) (50 mL/Hr) IV Continuous <Continuous>  famotidine    Tablet 20 milliGRAM(s) Oral daily  ferrous    sulfate Liquid 300 milliGRAM(s) Enteral Tube daily  formoterol for nebulization 20 MICROGram(s) Nebulizer two times a day  gabapentin   Solution 300 milliGRAM(s) Oral every 12 hours  heparin  Infusion.  Unit(s)/Hr (10 mL/Hr) IV Continuous <Continuous>  hydrocortisone sodium succinate Injectable 25 milliGRAM(s) IV Push every 6 hours  insulin lispro (HumaLOG) corrective regimen sliding scale   SubCutaneous every 6 hours  levothyroxine Injectable 60 MICROGram(s) IV Push at bedtime  Medical Marijuana Awake Oil 2 milliLiter(s),Medical Marijuana Awake Oil 2 milliLiter(s) 2 milliLiter(s) Enteral Tube <User Schedule>  Medical Marijuana Calm Oil 2 milliLiter(s),Medical [place] Calm Oil 2 milliLiter(s) 2 milliLiter(s) Enteral Tube <User Schedule>  Medical Marijuana [place] Oil 2 milliLiter(s),Medical Marijuana [place] Oil 2 milliLiter(s) 2 milliLiter(s) Enteral Tube <User Schedule>  meropenem  IVPB 500 milliGRAM(s) IV Intermittent every 12 hours  multivitamin/minerals/iron Oral Solution (CENTRUM) 15 milliLiter(s) Oral daily  QUEtiapine 25 milliGRAM(s) Oral at bedtime  zinc sulfate 220 milliGRAM(s) Oral daily    MEDICATIONS  (PRN):  acetaminophen  Suppository .. 650 milliGRAM(s) Rectal every 6 hours PRN Temp greater or equal to 38C (100.4F), Mild Pain (1 - 3)  carboxymethylcellulose 0.5% (Preservative-Free) Ophthalmic Solution 1 Drop(s) Both EYES three times a day PRN dry eyes  glucagon  Injectable 1 milliGRAM(s) IntraMuscular once PRN Glucose LESS THAN 70 milligrams/deciliter  heparin  Injectable 4000 Unit(s) IV Push every 6 hours PRN For aPTT less than 40  heparin  Injectable 2000 Unit(s) IV Push every 6 hours PRN For aPTT between 40 - 57  nystatin Powder 1 Application(s) Topical three times a day PRN under breasts      ALLERGIES:  Allergies    ASA; dye contrast (Anaphylaxis)  aspirin (Short breath)  divalproex sodium (Other (Unknown))  Flowers and Plants (Short breath)  Haldol (Other (Unknown))  penicillin (Short breath; Rash)  sulfa drugs (Short breath; Rash)  vancomycin (Rash; Urticaria; Hives)  Xanax (Other (Unknown))    Intolerances        LABS:                        8.5    24.4  )-----------( 352      ( 15 Jul 2019 00:14 )             24.9     Hemoglobin: 8.5 g/dL (07-15 @ 00:14)  Hemoglobin: 9.6 g/dL (07-14 @ 03:41)  Hemoglobin: 8.8 g/dL (07-13 @ 10:58)  Hemoglobin: 8.0 g/dL (07-13 @ 02:48)  Hemoglobin: 9.0 g/dL (07-12 @ 16:37)    CBC Full  -  ( 15 Jul 2019 00:14 )  WBC Count : 24.4 K/uL  RBC Count : 2.79 M/uL  Hemoglobin : 8.5 g/dL  Hematocrit : 24.9 %  Platelet Count - Automated : 352 K/uL  Mean Cell Volume : 89.2 fl  Mean Cell Hemoglobin : 30.4 pg  Mean Cell Hemoglobin Concentration : 34.1 gm/dL  Auto Neutrophil # : x  Auto Lymphocyte # : x  Auto Monocyte # : x  Auto Eosinophil # : x  Auto Basophil # : x  Auto Neutrophil % : x  Auto Lymphocyte % : x  Auto Monocyte % : x  Auto Eosinophil % : x  Auto Basophil % : x    07-15    130<L>  |  92<L>  |  97<H>  ----------------------------<  156<H>  4.0   |  20<L>  |  2.78<H>    Ca    9.7      15 Jul 2019 00:14  Phos  5.5     07-15  Mg     2.3     07-15    TPro  5.1<L>  /  Alb  2.0<L>  /  TBili  0.2  /  DBili  x   /  AST  12  /  ALT  11  /  AlkPhos  90  07-15    Creatinine Trend: 2.78<--, 2.62<--, 2.40<--, 2.31<--, 2.32<--, 2.23<--  LIVER FUNCTIONS - ( 15 Jul 2019 00:14 )  Alb: 2.0 g/dL / Pro: 5.1 g/dL / ALK PHOS: 90 U/L / ALT: 11 U/L / AST: 12 U/L / GGT: x           PT/INR - ( 15 Jul 2019 00:14 )   PT: 12.8 sec;   INR: 1.11 ratio         PTT - ( 15 Jul 2019 00:14 )  PTT:73.0 sec      ABG - ( 13 Jul 2019 09:51 )  pH, Arterial: 7.39  pH, Blood: x     /  pCO2: 35    /  pO2: 130   / HCO3: 20    / Base Excess: -3.7  /  SaO2: 99                          EKG:   MICROBIOLOGY:    Culture - Urine (collected 13 Jul 2019 10:20)  Source: .Urine  Final Report (14 Jul 2019 13:00):    >=3 organisms. Probable collection contamination.    Culture - Blood (collected 12 Jul 2019 18:59)  Source: .Blood  Preliminary Report (13 Jul 2019 19:01):    No growth to date.    Culture - Blood (collected 12 Jul 2019 16:59)  Source: .Blood  Preliminary Report (13 Jul 2019 17:01):    No growth to date.      IMAGING:      Labs, imaging, EKG personally reviewed    RADIOLOGY & ADDITIONAL TESTS: Reviewed. INTERVAL HPI/OVERNIGHT EVENTS: No acute events overnight. Unable to obtain ROS at bedside 2/2 advanced dementia. Per , pt seems more calm after receiving her home clonazepam yesterday.    SUBJECTIVE: Patient seen and examined at bedside.     OBJECTIVE:    VITAL SIGNS:  ICU Vital Signs Last 24 Hrs  T(C): 36.7 (15 Jul 2019 04:00), Max: 36.9 (14 Jul 2019 16:00)  T(F): 98 (15 Jul 2019 04:00), Max: 98.4 (14 Jul 2019 16:00)  HR: 126 (15 Jul 2019 07:00) (81 - 128)  BP: 147/86 (15 Jul 2019 07:00) (119/64 - 152/75)  BP(mean): 110 (15 Jul 2019 07:00) (86 - 110)  ABP: --  ABP(mean): --  RR: 16 (15 Jul 2019 07:00) (12 - 24)  SpO2: 100% (15 Jul 2019 07:00) (95% - 100%)        07-14 @ 07:01  -  07-15 @ 07:00  --------------------------------------------------------  IN: 1010 mL / OUT: 320 mL / NET: 690 mL      CAPILLARY BLOOD GLUCOSE      POCT Blood Glucose.: 122 mg/dL (15 Jul 2019 05:02)      PHYSICAL EXAM:    General: cachectic  HEENT: NC/AT; clear conjunctiva  Neck: supple  Respiratory: CTA b/l in anterior lung fields  Cardiovascular: +S1/S2; RRR  Abdomen: soft, NT/ND; +BS x4, PEG tube intact without bleeding or erythema  Extremities: WWP, 2+ peripheral pulses b/l; no LE edema  Skin: normal color and turgor; no rash  Neurological: A&Ox0, somewhat responsive to verbal stimuli    MEDICATIONS:  MEDICATIONS  (STANDING):  artificial tears (preservative free) Ophthalmic Solution 1 Drop(s) Both EYES four times a day  ascorbic acid 500 milliGRAM(s) Oral daily  BACItracin   Ointment 1 Application(s) Topical two times a day  chlorhexidine 2% Cloths 1 Application(s) Topical daily  chlorhexidine 4% Liquid 1 Application(s) Topical <User Schedule>  clonazePAM  Tablet 1 milliGRAM(s) Oral at bedtime  Dakins Solution - 1/4 Strength 1 Application(s) Topical two times a day  dextrose 5%. 1000 milliLiter(s) (50 mL/Hr) IV Continuous <Continuous>  famotidine    Tablet 20 milliGRAM(s) Oral daily  ferrous    sulfate Liquid 300 milliGRAM(s) Enteral Tube daily  formoterol for nebulization 20 MICROGram(s) Nebulizer two times a day  gabapentin   Solution 300 milliGRAM(s) Oral every 12 hours  heparin  Infusion.  Unit(s)/Hr (10 mL/Hr) IV Continuous <Continuous>  hydrocortisone sodium succinate Injectable 25 milliGRAM(s) IV Push every 6 hours  insulin lispro (HumaLOG) corrective regimen sliding scale   SubCutaneous every 6 hours  levothyroxine Injectable 60 MICROGram(s) IV Push at bedtime  Medical Marijuana Awake Oil 2 milliLiter(s),Medical Marijuana Awake Oil 2 milliLiter(s) 2 milliLiter(s) Enteral Tube <User Schedule>  Medical Marijuana Calm Oil 2 milliLiter(s),Medical [place] Calm Oil 2 milliLiter(s) 2 milliLiter(s) Enteral Tube <User Schedule>  Medical Marijuana Mineral Oil 2 milliLiter(s),Medical Marijuana Mineral Oil 2 milliLiter(s) 2 milliLiter(s) Enteral Tube <User Schedule>  meropenem  IVPB 500 milliGRAM(s) IV Intermittent every 12 hours  multivitamin/minerals/iron Oral Solution (CENTRUM) 15 milliLiter(s) Oral daily  QUEtiapine 25 milliGRAM(s) Oral at bedtime  zinc sulfate 220 milliGRAM(s) Oral daily    MEDICATIONS  (PRN):  acetaminophen  Suppository .. 650 milliGRAM(s) Rectal every 6 hours PRN Temp greater or equal to 38C (100.4F), Mild Pain (1 - 3)  carboxymethylcellulose 0.5% (Preservative-Free) Ophthalmic Solution 1 Drop(s) Both EYES three times a day PRN dry eyes  glucagon  Injectable 1 milliGRAM(s) IntraMuscular once PRN Glucose LESS THAN 70 milligrams/deciliter  heparin  Injectable 4000 Unit(s) IV Push every 6 hours PRN For aPTT less than 40  heparin  Injectable 2000 Unit(s) IV Push every 6 hours PRN For aPTT between 40 - 57  nystatin Powder 1 Application(s) Topical three times a day PRN under breasts      ALLERGIES:  Allergies    ASA; dye contrast (Anaphylaxis)  aspirin (Short breath)  divalproex sodium (Other (Unknown))  Flowers and Plants (Short breath)  Haldol (Other (Unknown))  penicillin (Short breath; Rash)  sulfa drugs (Short breath; Rash)  vancomycin (Rash; Urticaria; Hives)  Xanax (Other (Unknown))    Intolerances        LABS:                        8.5    24.4  )-----------( 352      ( 15 Jul 2019 00:14 )             24.9     Hemoglobin: 8.5 g/dL (07-15 @ 00:14)  Hemoglobin: 9.6 g/dL (07-14 @ 03:41)  Hemoglobin: 8.8 g/dL (07-13 @ 10:58)  Hemoglobin: 8.0 g/dL (07-13 @ 02:48)  Hemoglobin: 9.0 g/dL (07-12 @ 16:37)    CBC Full  -  ( 15 Jul 2019 00:14 )  WBC Count : 24.4 K/uL  RBC Count : 2.79 M/uL  Hemoglobin : 8.5 g/dL  Hematocrit : 24.9 %  Platelet Count - Automated : 352 K/uL  Mean Cell Volume : 89.2 fl  Mean Cell Hemoglobin : 30.4 pg  Mean Cell Hemoglobin Concentration : 34.1 gm/dL  Auto Neutrophil # : x  Auto Lymphocyte # : x  Auto Monocyte # : x  Auto Eosinophil # : x  Auto Basophil # : x  Auto Neutrophil % : x  Auto Lymphocyte % : x  Auto Monocyte % : x  Auto Eosinophil % : x  Auto Basophil % : x    07-15    130<L>  |  92<L>  |  97<H>  ----------------------------<  156<H>  4.0   |  20<L>  |  2.78<H>    Ca    9.7      15 Jul 2019 00:14  Phos  5.5     07-15  Mg     2.3     07-15    TPro  5.1<L>  /  Alb  2.0<L>  /  TBili  0.2  /  DBili  x   /  AST  12  /  ALT  11  /  AlkPhos  90  07-15    Creatinine Trend: 2.78<--, 2.62<--, 2.40<--, 2.31<--, 2.32<--, 2.23<--  LIVER FUNCTIONS - ( 15 Jul 2019 00:14 )  Alb: 2.0 g/dL / Pro: 5.1 g/dL / ALK PHOS: 90 U/L / ALT: 11 U/L / AST: 12 U/L / GGT: x           PT/INR - ( 15 Jul 2019 00:14 )   PT: 12.8 sec;   INR: 1.11 ratio         PTT - ( 15 Jul 2019 00:14 )  PTT:73.0 sec      ABG - ( 13 Jul 2019 09:51 )  pH, Arterial: 7.39  pH, Blood: x     /  pCO2: 35    /  pO2: 130   / HCO3: 20    / Base Excess: -3.7  /  SaO2: 99                          EKG:   MICROBIOLOGY:    Culture - Urine (collected 13 Jul 2019 10:20)  Source: .Urine  Final Report (14 Jul 2019 13:00):    >=3 organisms. Probable collection contamination.    Culture - Blood (collected 12 Jul 2019 18:59)  Source: .Blood  Preliminary Report (13 Jul 2019 19:01):    No growth to date.    Culture - Blood (collected 12 Jul 2019 16:59)  Source: .Blood  Preliminary Report (13 Jul 2019 17:01):    No growth to date.      IMAGING:      Labs, imaging, EKG personally reviewed    RADIOLOGY & ADDITIONAL TESTS: Reviewed.

## 2019-07-15 NOTE — PROGRESS NOTE ADULT - ASSESSMENT
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 since admission, went to IR to have PEG replaced.  TRANG and hyperkalemia.    1. TRANG likely multifactorial with vancomycin clearly a contributing factor.  Creatinine still rising as expected with high vanco levels as well.   2. Hyponatremia from TRANG, mild but trending lower  3. Hypercalcemia, stable   4. Hyperkalemia, resolved with change in feeds   5. Anemia. Hgb improved.     RECOMMEND:  - On IVF but I suspect will need IV lasix to augment urine outpt  -  Continue to hold Vanco and trend level.  I suspect when the level approaches around 15 the effects of the vanco will иван provided no further hemodynamic compromises occur  - Continue TF with Nephro till the creatinine improves    - BMP daily  - Monitor I's and O's and daily weights  - Plan as per MICU team and ID.  Blood cx remains negative but the WBC is still elevated

## 2019-07-15 NOTE — PROGRESS NOTE ADULT - ASSESSMENT
69F with PMHx of severe dementia (AOx0), nonverbal, and bedbound, asthma on chronic steroids, CVA, HLD, 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 Now stable off pressors, on NC.    #Neuro  Baseline AOx0, nonverbal, bedbound  c/w Klonopin and Seroquel nightly   c/w Gabapentin TID     #CV  Currently Hemodynamically stable, off pressors from 7/13  C/w hydrocortisone 25mg q8hrs  Patient hx of RLE DVT on Eliquis outpatient, stopped on 6/26 2/2 drop in Hgb received 1 unit PRBC     #Pulm  Patient hypoxic to 80s during RRT.   Now on stable on NC    #GI  On TF, tolerating good     #Endo  On chronic steroids at home, continue hydrocortisone    #ID  Patient with hx of multiple infections - including MRSA hip and aspiration PNA   On Meropenem & Vancomycin f/u Vancomycin level in AM to dose Vanc  Pt with Sacral decubitus ulcers not appear to be infected, Wound care following      #Renal   Baseline SCr 0.3, now with TRANG, worsening with minimal UOP  Likely hypovolemic and intravascularly depleted  Will give 250cc IV albumin and assess response  Blackman exchanged on 7/13  UCX NGTD      #Heme  Anemic at baseline, normocytic, currently at baseline     #DVT PPX  HSQ 69F with PMHx of severe dementia (AOx0), nonverbal, and bedbound, asthma on chronic steroids, CVA, HLD, 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 Now stable off pressors, on NC.    #Neuro  Baseline AOx0, nonverbal, bedbound  c/w Klonopin and Seroquel nightly, will also restart home tizanidine  c/w Gabapentin TID     #CV  Currently Hemodynamically stable, off pressors from 7/13  Will decrease hydrocortisone to 25mg q8hrs from q6hrs  Patient hx of RLE DVT on Eliquis outpatient, stopped on 6/26 2/2 drop in Hgb received 1 unit PRBC and now stable, heparin gtt restarted on 7/13. Will continue to monitor for signs/sx of active bleeding.    #Pulm  Patient hypoxic to 80s during RRT prior to transfer to MICU, most likely 2/2 aspiration pna  Now on stable on 4L nasal cannula, titrate down as tolerated    #GI  Tolerating tube feeds     #Endo  On chronic steroids at home, continue hydrocortisone and titrate down to home prednisone    #ID  Patient with hx of multiple infections - including MRSA hip and aspiration PNA   On Meropenem & Vancomycin f/u Vancomycin level in AM to dose Vanc  Pt with Sacral decubitus ulcers not appear to be infected, Wound care following      #Renal   Baseline SCr 0.3, now with TRANG, worsening with minimal UOP  Likely hypovolemic and intravascularly depleted  Will give IVF: NS at 75cc/hr for 24 hours  Blackman exchanged on 7/13  UCX NGTD    #Heme  Anemic at baseline, normocytic, currently at baseline     #DVT PPX  heparin gtt    Diana Apple MD  PGY3 MICU   [phone]

## 2019-07-15 NOTE — PROGRESS NOTE ADULT - SUBJECTIVE AND OBJECTIVE BOX
[facility] [phone]        Patient seen and examined in bed.  She in on NC and IVF.  No pressors         MEDICATIONS  (STANDING):  artificial tears (preservative free) Ophthalmic Solution 1 Drop(s) Both EYES four times a day  ascorbic acid 500 milliGRAM(s) Oral daily  BACItracin   Ointment 1 Application(s) Topical two times a day  chlorhexidine 2% Cloths 1 Application(s) Topical daily  chlorhexidine 4% Liquid 1 Application(s) Topical <User Schedule>  clonazePAM  Tablet 1 milliGRAM(s) Oral at bedtime  Dakins Solution - 1/4 Strength 1 Application(s) Topical two times a day  dextrose 5%. 1000 milliLiter(s) (50 mL/Hr) IV Continuous <Continuous>  famotidine    Tablet 20 milliGRAM(s) Oral daily  ferrous    sulfate Liquid 300 milliGRAM(s) Enteral Tube daily  formoterol for nebulization 20 MICROGram(s) Nebulizer two times a day  gabapentin   Solution 300 milliGRAM(s) Oral every 12 hours  heparin  Infusion.  Unit(s)/Hr (10 mL/Hr) IV Continuous <Continuous>  hydrocortisone sodium succinate Injectable 25 milliGRAM(s) IV Push every 8 hours  insulin lispro (HumaLOG) corrective regimen sliding scale   SubCutaneous every 6 hours  levothyroxine Injectable 60 MICROGram(s) IV Push at bedtime  Medical Marijuana Awake Oil 2 milliLiter(s),Medical Marijuana Awake Oil 2 milliLiter(s) 2 milliLiter(s) Enteral Tube <User Schedule>  Medical Marijuana Calm Oil 2 milliLiter(s),Medical [place] Calm Oil 2 milliLiter(s) 2 milliLiter(s) Enteral Tube <User Schedule>  Medical Marijuana [place] Oil 2 milliLiter(s),Medical Marijuana [place] Oil 2 milliLiter(s) 2 milliLiter(s) Enteral Tube <User Schedule>  meropenem  IVPB 500 milliGRAM(s) IV Intermittent every 12 hours  multivitamin/minerals/iron Oral Solution (CENTRUM) 15 milliLiter(s) Oral daily  QUEtiapine 25 milliGRAM(s) Oral at bedtime  sodium chloride 0.9%. 1000 milliLiter(s) (75 mL/Hr) IV Continuous <Continuous>  tiZANidine 4 milliGRAM(s) Oral <User Schedule>  zinc sulfate 220 milliGRAM(s) Oral daily      VITAL:  T(C): , Max: 36.9 (07-14-19 @ 16:00)  T(F): , Max: 98.4 (07-14-19 @ 16:00)  HR: 122 (07-15-19 @ 09:00)  BP: 146/85 (07-15-19 @ 09:00)  BP(mean): 109 (07-15-19 @ 09:00)  RR: 15 (07-15-19 @ 09:00)  SpO2: 100% (07-15-19 @ 09:00)  Wt(kg): --    I and O's:    07-14 @ 07:01  -  07-15 @ 07:00  --------------------------------------------------------  IN: 1010 mL / OUT: 320 mL / NET: 690 mL    07-15 @ 07:01  -  07-15 @ 10:47  --------------------------------------------------------  IN: 80 mL / OUT: 0 mL / NET: 80 mL          PHYSICAL EXAM:    Constitutional: NAD; cachetic   Neck:  No JVD  Respiratory: poor effort   Cardiovascular: S1 and S2  Gastrointestinal: BS+, soft, NT/ND  Extremities: +  peripheral edema  Neurological: unable   : +  Blackman  Skin: No rashes  Access: Not applicable    LABS:                        8.5    24.4  )-----------( 352      ( 15 Jul 2019 00:14 )             24.9     07-15    130<L>  |  92<L>  |  97<H>  ----------------------------<  156<H>  4.0   |  20<L>  |  2.78<H>    Ca    9.7      15 Jul 2019 00:14  Phos  5.5     07-15  Mg     2.3     07-15    TPro  5.1<L>  /  Alb  2.0<L>  /  TBili  0.2  /  DBili  x   /  AST  12  /  ALT  11  /  AlkPhos  90  07-15          Urine Studies:          RADIOLOGY & ADDITIONAL STUDIES:        < from: Xray Chest 1 View-PORTABLE IMMEDIATE (07.13.19 @ 02:21) >    EXAM:  XR CHEST PORTABLE IMMED 1V                            PROCEDURE DATE:  07/13/2019            INTERPRETATION:  XR CHEST IMMEDIATE. AP view.    INDICATION: rapid response for hypotension, shortness of breath    COMPARISON: 7/12/2019    FINDINGS/  IMPRESSION:    Left pleural effusion and underlying atelectasis/consolidation is   unchanged. Interstitial edema. No pneumothorax.                    JAYRO SABA M.D., ATTENDING RADIOLOGIST  This document has been electronically signed. Jul 13 2019  1:22PM                < end of copied text >

## 2019-07-15 NOTE — PROGRESS NOTE ADULT - ASSESSMENT
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 Since admission, went to IR to have PEG replaced (6/25/19)  +fever and RRT over weekend for suspected aspiration event, and with Renal Insufficiency    -Discussed option of GJ tube but explained high risk of clogging and cannot do bolus feeds and pt would still be at risk for aspiration from secretions as she is presently with G tube (spouse and family do not want to explore GJ conversion option)    s/p RRT on 7/13 for hypotension in the SBP 50-60s and hypoxia in the 80s, admitted to MICU for shock requiring IV pressor support. Now stable off pressors, on NC. On stress steroids dosing    RECS:  -keep G tube bumper at ~2-3 cm richard on G tube. Discussed with daughter at bedside who admits they are used to placing multiple pieces of gauze under bumper in effort to protect the skin.  Daughter educated and expresses understanding  -G tube feeds  -wound care  -Abx as per ID  -monitor BMs  -Continue Pepcid  -MICU care      Lengthy discussion with daughter at bedside, all questions answered  Discussed with MICU team    Adam Miller PA-C    [facility]  [phone]  After hours and weekend coverage [phone]

## 2019-07-15 NOTE — CHART NOTE - NSCHARTNOTEFT_GEN_A_CORE
MICU Transfer Note    Transfer from: MICU    Transfer to: ( x ) Medicine    (  ) Telemetry     (   ) RCU        (    ) Palliative         (   ) Stroke Unit          (   ) __________________    Accepting Physician: Dr. 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  thinks she must have pulled it out this morning. He says otherwise she was in her usual state of health at home. He says she has had some intermittent low grade fevers at home, which usually respond to Tylenol. Of note, gavin was due to be changed early July 2019 per . Last dose of Eliquis was 6/23/19 8pm. In ED patient received 1LNS and cefepime.     Hospital course: PEG tube replaced on 6/25. Hospital course is c/b recurrent fever 2/2 to PNA, anemia s/p 1 PRBC transfusion, TRANG 2/2 prerenal azotemia and ischemic ATN from vanc. Patient developed hypothermia and increased WOB last night and persistent hypotension sBP 50-60s unresponsive to 1.5 L of LR and 7.5 midodrine, requiring pressor.    Patient was weaned off pressors within 24 hours likely 2/2 sepsis vs. adrenal insufficiency. Patient home dosage of prednisone 7.5 mg has been held while in the hospital. She was started on Meropenem, Vancomycin level already suprathreaputic. Patient was treated for possible aspiration pneumonia. Her TRANG is improving. Sacral decubitus ulcer do not appear infected and wouldn't care is following. Her sedating medications were initially held but were restarted as her mentation improve. Her baseline is intermittently alert, bedbound, functional quadriplegic. She has ability to make eye contact but not always. Extensive discussion with family patient is FULL CODE. She required high flow then weaned to face tent and then to NC currently 3L. Of note, patient also has history of extensive proximal DVT, patient's home AC held in the setting of ?bleed requiring pRBC transfusion. Restarted on heparin gtt while on MICU. Hg remained stable. Patient is hemodynamically stable for transfer to medicine floors.       ASSESSMENT & PLAN:     69F with PMHx of severe dementia (AOx0), nonverbal, and bedbound, asthma on chronic steroids, CVA, HLD, chronic MRSA infection of R hip prosthesis s/p spacer in place PEG, RLE DVT (on AC), multiple decub ulcers, UTI, Asp PNA and initially readmitted for dislodged PEG and now w/ sepsis due to aspiration PNA and UTI again c/b TRANG and hyperkalemia, RRT on 7/13 for hypotension in the SBP 50-60s and hypoxia in the 80s, admitted to MICU for shock requiring IV pressor support likely 2/2 sepsis     #Neuro  Baseline AOx0, nonverbal, bedbound  c/w Klonopin and Seroquel nightly, restarted home tizanidine  c/w Gabapentin TID     #CV  Currently Hemodynamically stable, off pressors from 7/13  Will decrease hydrocortisone to 25mg q8hrs from q6hrs  Patient hx of RLE DVT on Eliquis outpatient, stopped on 6/26 2/2 drop in Hgb received 1 unit PRBC and now stable, heparin gtt restarted on 7/13. Will continue to monitor for signs/sx of active bleeding.    #Pulm  Patient hypoxic to 80s during RRT prior to transfer to MICU, most likely 2/2 aspiration pna  Now on stable on 4L nasal cannula, titrate down as tolerated    #GI  Tolerating tube feeds     #Endo  On chronic steroids at home, continue hydrocortisone and titrate down to home prednisone    #ID  Patient with hx of multiple infections - including MRSA hip and aspiration PNA   On Meropenem & Vancomycin f/u Vancomycin level in AM to dose Vanc  Pt with Sacral decubitus ulcers not appear to be infected, Wound care following      #Renal   Baseline SCr 0.3, now with TRANG, worsening with minimal UOP  Likely hypovolemic and intravascularly depleted  Will give IVF: NS at 75cc/hr for 24 hours  Gavin exchanged on 7/13  UCX NGTD    #Heme  Anemic at baseline, normocytic, currently at baseline     #DVT PPX  heparin gtt    FOR FOLLOW UP:  -Continue to taper stress dose steroids, patient's home dosage of prednisone 7.5 mg daily   -Continue heparin gtt for RLE DVT proximal, can transition to Eliquis (home med)  Continue tx for aspiration PNA with meropenem, currently on 2L  -F/u Vanc level given chronic MRSA infection, f/u ID recs  -F/u wound care recs for chronic sacral decubitus ulcers   -Monitor SCr for improvement   -GOC: FULL CODE

## 2019-07-15 NOTE — PROGRESS NOTE ADULT - SUBJECTIVE AND OBJECTIVE BOX
infectious diseases progress note:    Patient is a 69y old  Female who presents with a chief complaint of PEG tube dislodgement, fever, leukocytosis (15 Jul 2019 07:38)        Gastrostomy malfunction        R     Allergies    ASA; dye contrast (Anaphylaxis)  aspirin (Short breath)  divalproex sodium (Other (Unknown))  Flowers and Plants (Short breath)  Haldol (Other (Unknown))  penicillin (Short breath; Rash)  sulfa drugs (Short breath; Rash)  vancomycin (Rash; Urticaria; Hives)  Xanax (Other (Unknown))    Intolerances        ANTIBIOTICS/RELEVANT:  antimicrobials  meropenem  IVPB 500 milliGRAM(s) IV Intermittent every 12 hours    immunologic:    OTHER:  acetaminophen  Suppository .. 650 milliGRAM(s) Rectal every 6 hours PRN  artificial tears (preservative free) Ophthalmic Solution 1 Drop(s) Both EYES four times a day  ascorbic acid 500 milliGRAM(s) Oral daily  BACItracin   Ointment 1 Application(s) Topical two times a day  carboxymethylcellulose 0.5% (Preservative-Free) Ophthalmic Solution 1 Drop(s) Both EYES three times a day PRN  chlorhexidine 2% Cloths 1 Application(s) Topical daily  chlorhexidine 4% Liquid 1 Application(s) Topical <User Schedule>  clonazePAM  Tablet 1 milliGRAM(s) Oral at bedtime  Dakins Solution - 1/4 Strength 1 Application(s) Topical two times a day  dextrose 5%. 1000 milliLiter(s) IV Continuous <Continuous>  famotidine    Tablet 20 milliGRAM(s) Oral daily  ferrous    sulfate Liquid 300 milliGRAM(s) Enteral Tube daily  formoterol for nebulization 20 MICROGram(s) Nebulizer two times a day  gabapentin   Solution 300 milliGRAM(s) Oral every 12 hours  glucagon  Injectable 1 milliGRAM(s) IntraMuscular once PRN  heparin  Infusion.  Unit(s)/Hr IV Continuous <Continuous>  heparin  Injectable 4000 Unit(s) IV Push every 6 hours PRN  heparin  Injectable 2000 Unit(s) IV Push every 6 hours PRN  hydrocortisone sodium succinate Injectable 25 milliGRAM(s) IV Push every 6 hours  insulin lispro (HumaLOG) corrective regimen sliding scale   SubCutaneous every 6 hours  levothyroxine Injectable 60 MICROGram(s) IV Push at bedtime  Medical Marijuana Awake Oil 2 milliLiter(s),Medical Marijuana Awake Oil 2 milliLiter(s) 2 milliLiter(s) Enteral Tube <User Schedule>  Medical Marijuana Calm Oil 2 milliLiter(s),Medical [place] Calm Oil 2 milliLiter(s) 2 milliLiter(s) Enteral Tube <User Schedule>  Medical Marijuana [place] Oil 2 milliLiter(s),Medical Marijuana [place] Oil 2 milliLiter(s) 2 milliLiter(s) Enteral Tube <User Schedule>  multivitamin/minerals/iron Oral Solution (CENTRUM) 15 milliLiter(s) Oral daily  nystatin Powder 1 Application(s) Topical three times a day PRN  QUEtiapine 25 milliGRAM(s) Oral at bedtime  zinc sulfate 220 milliGRAM(s) Oral daily      Objective:  Vital Signs Last 24 Hrs  T(C): 36.7 (15 Jul 2019 04:00), Max: 36.9 (14 Jul 2019 16:00)  T(F): 98 (15 Jul 2019 04:00), Max: 98.4 (14 Jul 2019 16:00)  HR: 126 (15 Jul 2019 07:00) (81 - 128)  BP: 147/86 (15 Jul 2019 07:00) (119/64 - 152/75)  BP(mean): 110 (15 Jul 2019 07:00) (86 - 110)  RR: 16 (15 Jul 2019 07:00) (12 - 24)  SpO2: 100% (15 Jul 2019 07:00) (95% - 100%)    PHYSICAL EXAM:     Ear/Nose/Throat: no oral lesion, no sinus tenderness on percussion	  Neck:no JVD, no lymphadenopathy, supple  Respiratory: CTA maryjane  Cardiovascular: S1S2 RRR, no murmurs  Gastrointestinal:soft, (+) BS, no HSM  Extremities:no e/e/c        LABS:                        8.5    24.4  )-----------( 352      ( 15 Jul 2019 00:14 )             24.9     07-15    130<L>  |  92<L>  |  97<H>  ----------------------------<  156<H>  4.0   |  20<L>  |  2.78<H>    Ca    9.7      15 Jul 2019 00:14  Phos  5.5     07-15  Mg     2.3     07-15    TPro  5.1<L>  /  Alb  2.0<L>  /  TBili  0.2  /  DBili  x   /  AST  12  /  ALT  11  /  AlkPhos  90  07-15    PT/INR - ( 15 Jul 2019 00:14 )   PT: 12.8 sec;   INR: 1.11 ratio         PTT - ( 15 Jul 2019 00:14 )  PTT:73.0 sec        MICROBIOLOGY:    RECENT CULTURES:  07-13 @ 10:20 .Urine                >=3 organisms. Probable collection contamination.    07-12 @ 18:59 .Blood                No growth to date.    07-12 @ 16:59 .Blood                No growth to date.          RESPIRATORY CULTURES:              RADIOLOGY & ADDITIONAL STUDIES:        Pager [phone]  After 5 pm/weekends or if no response :[phone]

## 2019-07-15 NOTE — PROGRESS NOTE ADULT - ASSESSMENT
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  Fever and leukocytosis now. Abnormal CT chest.    , patient found with hypoxia and AMS suspect from aspiration event.            aspiration/pna     all cultures negative and temps better    back on meropenem for hypotension   vanco levels still high   hopefully creat will come down off vanco   unresponsive today  reculture for signs  of infection  Her cachexia is severe

## 2019-07-16 LAB
APTT BLD: 31.4 SEC — SIGNIFICANT CHANGE UP (ref 27.5–36.3)
GLUCOSE BLDC GLUCOMTR-MCNC: 129 MG/DL — HIGH (ref 70–99)
GLUCOSE BLDC GLUCOMTR-MCNC: 165 MG/DL — HIGH (ref 70–99)
GLUCOSE BLDC GLUCOMTR-MCNC: 167 MG/DL — HIGH (ref 70–99)
GLUCOSE BLDC GLUCOMTR-MCNC: 167 MG/DL — HIGH (ref 70–99)
GLUCOSE BLDC GLUCOMTR-MCNC: 187 MG/DL — HIGH (ref 70–99)
HCT VFR BLD CALC: 25.4 % — LOW (ref 34.5–45)
HGB BLD-MCNC: 8.1 G/DL — LOW (ref 11.5–15.5)
MCHC RBC-ENTMCNC: 29.3 PG — SIGNIFICANT CHANGE UP (ref 27–34)
MCHC RBC-ENTMCNC: 31.9 GM/DL — LOW (ref 32–36)
MCV RBC AUTO: 92 FL — SIGNIFICANT CHANGE UP (ref 80–100)
NT-PROBNP SERPL-SCNC: HIGH PG/ML (ref 0–300)
PLATELET # BLD AUTO: 323 K/UL — SIGNIFICANT CHANGE UP (ref 150–400)
RBC # BLD: 2.76 M/UL — LOW (ref 3.8–5.2)
RBC # FLD: 14.8 % — HIGH (ref 10.3–14.5)
VANCOMYCIN TROUGH SERPL-MCNC: 19.3 UG/ML — SIGNIFICANT CHANGE UP (ref 10–20)
WBC # BLD: 17.76 K/UL — HIGH (ref 3.8–10.5)
WBC # FLD AUTO: 17.76 K/UL — HIGH (ref 3.8–10.5)

## 2019-07-16 PROCEDURE — 99232 SBSQ HOSP IP/OBS MODERATE 35: CPT

## 2019-07-16 RX ORDER — FUROSEMIDE 40 MG
60 TABLET ORAL ONCE
Refills: 0 | Status: COMPLETED | OUTPATIENT
Start: 2019-07-16 | End: 2019-07-16

## 2019-07-16 RX ADMIN — Medication 25 MILLIGRAM(S): at 22:52

## 2019-07-16 RX ADMIN — QUETIAPINE FUMARATE 25 MILLIGRAM(S): 200 TABLET, FILM COATED ORAL at 22:42

## 2019-07-16 RX ADMIN — MEROPENEM 100 MILLIGRAM(S): 1 INJECTION INTRAVENOUS at 17:25

## 2019-07-16 RX ADMIN — GABAPENTIN 300 MILLIGRAM(S): 400 CAPSULE ORAL at 20:58

## 2019-07-16 RX ADMIN — Medication 500 MILLIGRAM(S): at 13:04

## 2019-07-16 RX ADMIN — Medication 15 MILLILITER(S): at 13:04

## 2019-07-16 RX ADMIN — Medication 1 APPLICATION(S): at 17:15

## 2019-07-16 RX ADMIN — Medication 1 MILLIGRAM(S): at 22:42

## 2019-07-16 RX ADMIN — Medication 1 DROP(S): at 13:02

## 2019-07-16 RX ADMIN — Medication 1: at 18:37

## 2019-07-16 RX ADMIN — ZINC SULFATE TAB 220 MG (50 MG ZINC EQUIVALENT) 220 MILLIGRAM(S): 220 (50 ZN) TAB at 13:05

## 2019-07-16 RX ADMIN — Medication 20 MICROGRAM(S): at 20:02

## 2019-07-16 RX ADMIN — MEROPENEM 100 MILLIGRAM(S): 1 INJECTION INTRAVENOUS at 06:46

## 2019-07-16 RX ADMIN — Medication 20 MICROGRAM(S): at 09:04

## 2019-07-16 RX ADMIN — Medication 300 MILLIGRAM(S): at 13:04

## 2019-07-16 RX ADMIN — TIZANIDINE 4 MILLIGRAM(S): 4 TABLET ORAL at 09:03

## 2019-07-16 RX ADMIN — Medication 1 DROP(S): at 09:02

## 2019-07-16 RX ADMIN — Medication 1 APPLICATION(S): at 06:50

## 2019-07-16 RX ADMIN — Medication 1 DROP(S): at 17:14

## 2019-07-16 RX ADMIN — Medication 60 MICROGRAM(S): at 22:42

## 2019-07-16 RX ADMIN — Medication 1: at 23:52

## 2019-07-16 RX ADMIN — Medication 1: at 06:52

## 2019-07-16 RX ADMIN — Medication 1: at 00:55

## 2019-07-16 RX ADMIN — Medication 25 MILLIGRAM(S): at 13:05

## 2019-07-16 RX ADMIN — Medication 1 APPLICATION(S): at 17:14

## 2019-07-16 RX ADMIN — Medication 60 MILLIGRAM(S): at 13:02

## 2019-07-16 RX ADMIN — Medication 25 MILLIGRAM(S): at 06:46

## 2019-07-16 RX ADMIN — FAMOTIDINE 20 MILLIGRAM(S): 10 INJECTION INTRAVENOUS at 13:03

## 2019-07-16 RX ADMIN — Medication 1 APPLICATION(S): at 09:03

## 2019-07-16 RX ADMIN — GABAPENTIN 300 MILLIGRAM(S): 400 CAPSULE ORAL at 09:03

## 2019-07-16 RX ADMIN — TIZANIDINE 4 MILLIGRAM(S): 4 TABLET ORAL at 20:59

## 2019-07-16 NOTE — PROGRESS NOTE ADULT - SUBJECTIVE AND OBJECTIVE BOX
infectious diseases progress note:    Patient is a 69y old  Female who presents with a chief complaint of PEG tube dislodgement, fever, leukocytosis (15 Jul 2019 18:14)        Gastrostomy malfunction         ss    Allergies    ASA; dye contrast (Anaphylaxis)  aspirin (Short breath)  divalproex sodium (Other (Unknown))  Flowers and Plants (Short breath)  Haldol (Other (Unknown))  penicillin (Short breath; Rash)  sulfa drugs (Short breath; Rash)  vancomycin (Rash; Urticaria; Hives)  Xanax (Other (Unknown))    Intolerances        ANTIBIOTICS/RELEVANT:  antimicrobials  meropenem  IVPB 500 milliGRAM(s) IV Intermittent every 12 hours    immunologic:    OTHER:  acetaminophen  Suppository .. 650 milliGRAM(s) Rectal every 6 hours PRN  artificial tears (preservative free) Ophthalmic Solution 1 Drop(s) Both EYES four times a day  ascorbic acid 500 milliGRAM(s) Oral daily  BACItracin   Ointment 1 Application(s) Topical two times a day  carboxymethylcellulose 0.5% (Preservative-Free) Ophthalmic Solution 1 Drop(s) Both EYES three times a day PRN  clonazePAM  Tablet 1 milliGRAM(s) Oral at bedtime  Dakins Solution - 1/4 Strength 1 Application(s) Topical two times a day  dextrose 5%. 1000 milliLiter(s) IV Continuous <Continuous>  famotidine    Tablet 20 milliGRAM(s) Oral daily  ferrous    sulfate Liquid 300 milliGRAM(s) Enteral Tube daily  formoterol for nebulization 20 MICROGram(s) Nebulizer two times a day  gabapentin   Solution 300 milliGRAM(s) Oral every 12 hours  glucagon  Injectable 1 milliGRAM(s) IntraMuscular once PRN  heparin  Infusion.  Unit(s)/Hr IV Continuous <Continuous>  heparin  Injectable 4000 Unit(s) IV Push every 6 hours PRN  heparin  Injectable 2000 Unit(s) IV Push every 6 hours PRN  hydrocortisone sodium succinate Injectable 25 milliGRAM(s) IV Push every 8 hours  insulin lispro (HumaLOG) corrective regimen sliding scale   SubCutaneous every 6 hours  levothyroxine Injectable 60 MICROGram(s) IV Push at bedtime  Medical Marijuana Awake Oil 2 milliLiter(s),Medical Marijuana Awake Oil 2 milliLiter(s) 2 milliLiter(s) Enteral Tube <User Schedule>  Medical Marijuana Calm Oil 2 milliLiter(s),Medical [place] Calm Oil 2 milliLiter(s) 2 milliLiter(s) Enteral Tube <User Schedule>  Medical Marijuana [place] Oil 2 milliLiter(s),Medical Marijuana [place] Oil 2 milliLiter(s) 2 milliLiter(s) Enteral Tube <User Schedule>  multivitamin/minerals/iron Oral Solution (CENTRUM) 15 milliLiter(s) Oral daily  nystatin Powder 1 Application(s) Topical three times a day PRN  QUEtiapine 25 milliGRAM(s) Oral at bedtime  sodium chloride 0.9%. 1000 milliLiter(s) IV Continuous <Continuous>  tiZANidine 4 milliGRAM(s) Oral <User Schedule>  zinc sulfate 220 milliGRAM(s) Oral daily      Objective:  Vital Signs Last 24 Hrs  T(C): 36.5 (16 Jul 2019 04:34), Max: 37.8 (16 Jul 2019 00:00)  T(F): 97.7 (16 Jul 2019 04:34), Max: 100 (16 Jul 2019 00:00)  HR: 119 (16 Jul 2019 04:34) (119 - 129)  BP: 154/99 (16 Jul 2019 04:34) (120/68 - 159/87)  BP(mean): 91 (16 Jul 2019 00:00) (88 - 113)  RR: 18 (16 Jul 2019 04:34) (15 - 22)  SpO2: 98% (16 Jul 2019 04:34) (94% - 100%)     Ear/Nose/Throat: no oral lesion, no sinus tenderness on percussion	  Neck:no JVD, no lymphadenopathy, supple  Respiratory: CTA maryjane  Cardiovascular: S1S2 RRR, no murmurs  Gastrointestinal:soft, (+) BS, no HSM  Extremities:no e/e/c        LABS:                        8.5    24.4  )-----------( 352      ( 15 Jul 2019 00:14 )             24.9     07-15    130<L>  |  92<L>  |  97<H>  ----------------------------<  156<H>  4.0   |  20<L>  |  2.78<H>    Ca    9.7      15 Jul 2019 00:14  Phos  5.5     07-15  Mg     2.3     07-15    TPro  5.1<L>  /  Alb  2.0<L>  /  TBili  0.2  /  DBili  x   /  AST  12  /  ALT  11  /  AlkPhos  90  07-15    PT/INR - ( 15 Jul 2019 00:14 )   PT: 12.8 sec;   INR: 1.11 ratio         PTT - ( 15 Jul 2019 00:14 )  PTT:73.0 sec        MICROBIOLOGY:    RECENT CULTURES:  07-13 @ 10:20 .Urine                >=3 organisms. Probable collection contamination.    07-12 @ 18:59 .Blood                No growth to date.    07-12 @ 16:59 .Blood                No growth to date.          RESPIRATORY CULTURES:              RADIOLOGY & ADDITIONAL STUDIES:        Pager [phone]  After 5 pm/weekends or if no response :[phone]

## 2019-07-16 NOTE — PROGRESS NOTE ADULT - ASSESSMENT
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 Since admission, went to IR to have PEG replaced (6/25/19)  +fever and RRT over weekend for suspected aspiration event, and with Renal Insufficiency    -Discussed option of GJ tube but explained high risk of clogging and cannot do bolus feeds and pt would still be at risk for aspiration from secretions as she is presently with G tube (spouse and family do not want to explore GJ conversion option)    s/p RRT on 7/13 for hypotension in the SBP 50-60s and hypoxia in the 80s, admitted to MICU for shock requiring IV pressor support. Now stable off pressors, on NC. On stress steroids dosing    RECS:  -keep G tube bumper at ~2-3 cm richard on G tube.  -G tube feeds  -wound care  -Abx as per ID  -monitor BMs  -Continue Pepcid    Lengthy discussion with family at bedside, all questions answered      Adam Miller PA-C    [facility]  [phone]  After hours and weekend coverage [phone]

## 2019-07-16 NOTE — PROGRESS NOTE ADULT - ASSESSMENT
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 since admission, went to IR to have PEG replaced.  TRANG and hyperkalemia.    1. TRANG likely multifactorial with vancomycin clearly a contributing factor.  Creatinine still rising as expected with high vanco levels as well.   2. Hyponatremia from TRANG, mild but trending lower  3. Hypercalcemia, stable   4. Anemia. Hgb improved.     RECOMMEND:  - Lasix to augment urine outpt  -  Continue to hold Vanco and trend level.  I suspect when the level approaches around 15 the effects of the vanco will иван provided no further hemodynamic compromises occur  - Continue TF with Nephro till the creatinine improves    - BMP daily  - Monitor I's and O's and daily weights  - Plan as per MICU team and ID.  Blood cx remains negative but the WBC is still elevated     I hope we can avoid HD in the frail lady.  Again if HD is needed it will only be started as a transient procedure till ATN improves.  She is not a candidate for permanent HD

## 2019-07-16 NOTE — PROGRESS NOTE ADULT - SUBJECTIVE AND OBJECTIVE BOX
PULMONARY PROGRESS NOTE    MYRIAM HEATH  MRN-[id number]    Patient is a 69y old  Female who presents with a chief complaint of PEG tube dislodgement, fever, leukocytosis (16 Jul 2019 07:37)      HPI:  -  -    ROS:   -    ACTIVE MEDICATION LIST:  MEDICATIONS  (STANDING):  artificial tears (preservative free) Ophthalmic Solution 1 Drop(s) Both EYES four times a day  ascorbic acid 500 milliGRAM(s) Oral daily  BACItracin   Ointment 1 Application(s) Topical two times a day  clonazePAM  Tablet 1 milliGRAM(s) Oral at bedtime  Dakins Solution - 1/4 Strength 1 Application(s) Topical two times a day  dextrose 5%. 1000 milliLiter(s) (50 mL/Hr) IV Continuous <Continuous>  famotidine    Tablet 20 milliGRAM(s) Oral daily  ferrous    sulfate Liquid 300 milliGRAM(s) Enteral Tube daily  formoterol for nebulization 20 MICROGram(s) Nebulizer two times a day  gabapentin   Solution 300 milliGRAM(s) Oral every 12 hours  heparin  Infusion.  Unit(s)/Hr (10 mL/Hr) IV Continuous <Continuous>  hydrocortisone sodium succinate Injectable 25 milliGRAM(s) IV Push every 8 hours  insulin lispro (HumaLOG) corrective regimen sliding scale   SubCutaneous every 6 hours  levothyroxine Injectable 60 MICROGram(s) IV Push at bedtime  Medical Marijuana Awake Oil 2 milliLiter(s),Medical Marijuana Awake Oil 2 milliLiter(s) 2 milliLiter(s) Enteral Tube <User Schedule>  Medical Marijuana Calm Oil 2 milliLiter(s),Medical [place] Calm Oil 2 milliLiter(s) 2 milliLiter(s) Enteral Tube <User Schedule>  Medical Marijuana [place] Oil 2 milliLiter(s),Medical Marijuana [place] Oil 2 milliLiter(s) 2 milliLiter(s) Enteral Tube <User Schedule>  meropenem  IVPB 500 milliGRAM(s) IV Intermittent every 12 hours  multivitamin/minerals/iron Oral Solution (CENTRUM) 15 milliLiter(s) Oral daily  QUEtiapine 25 milliGRAM(s) Oral at bedtime  sodium chloride 0.9%. 1000 milliLiter(s) (75 mL/Hr) IV Continuous <Continuous>  tiZANidine 4 milliGRAM(s) Oral <User Schedule>  zinc sulfate 220 milliGRAM(s) Oral daily    MEDICATIONS  (PRN):  acetaminophen  Suppository .. 650 milliGRAM(s) Rectal every 6 hours PRN Temp greater or equal to 38C (100.4F), Mild Pain (1 - 3)  carboxymethylcellulose 0.5% (Preservative-Free) Ophthalmic Solution 1 Drop(s) Both EYES three times a day PRN dry eyes  glucagon  Injectable 1 milliGRAM(s) IntraMuscular once PRN Glucose LESS THAN 70 milligrams/deciliter  heparin  Injectable 4000 Unit(s) IV Push every 6 hours PRN For aPTT less than 40  heparin  Injectable 2000 Unit(s) IV Push every 6 hours PRN For aPTT between 40 - 57  nystatin Powder 1 Application(s) Topical three times a day PRN under breasts      EXAM:  Vital Signs Last 24 Hrs  T(C): 36.4 (16 Jul 2019 10:00), Max: 37.8 (16 Jul 2019 00:00)  T(F): 97.6 (16 Jul 2019 10:00), Max: 100 (16 Jul 2019 00:00)  HR: 100 (16 Jul 2019 10:00) (100 - 129)  BP: 122/71 (16 Jul 2019 10:00) (120/68 - 159/87)  BP(mean): 91 (16 Jul 2019 00:00) (88 - 113)  RR: 18 (16 Jul 2019 10:00) (16 - 22)  SpO2: 100% (16 Jul 2019 10:00) (94% - 100%)    GENERAL: The patient is awake and alert in no apparent distress.     LUNGS: Clear to auscultation without wheezing, rales or rhonchi; respirations unlabored    HEART: Regular rate and rhythm without murmur.                            8.1    17.76 )-----------( 323      ( 16 Jul 2019 08:57 )             25.4       07-15    130<L>  |  92<L>  |  97<H>  ----------------------------<  156<H>  4.0   |  20<L>  |  2.78<H>    Ca    9.7      15 Jul 2019 00:14  Phos  5.5     07-15  Mg     2.3     07-15    TPro  5.1<L>  /  Alb  2.0<L>  /  TBili  0.2  /  DBili  x   /  AST  12  /  ALT  11  /  AlkPhos  90  07-15        PROBLEM LIST:  69y Female with HEALTH ISSUES - PROBLEM Dx:  Fever: Fever  Type 2 diabetes mellitus without complication, without long-term current use of insulin:   Make sure you get your HgA1c checked every three months.  If you take oral diabetes medications, check your blood glucose two times a day.  If you take insulin, check your blood glucose before meals and at bedtime.  It&#x27;s important not to skip any meals.  Keep a log of your blood glucose results and always take it with you to your doctor appointments.  Keep a list of your current medications including injectables and over the counter medications and bring this medication list with you to all your doctor appointments.  If you have not seen your ophthalmologist this year call for appointment.  Check your feet daily for redness, sores, or openings. Do not self treat. If no improvement in two days call your primary care physician for an appointment.  Low blood sugar (hypoglycemia) is a blood sugar below 70mg/dl. Check your blood sugar if you feel signs/symptoms of hypoglycemia. If your blood sugar is below 70 take 15 grams of carbohydrates (ex 4 oz of apple juice, 3-4 glucose tablets, or 4-6 oz of regular soda) wait 15 minutes and repeat blood sugar to make sure it comes up above 70.  If your blood sugar is above 70 and you are due for a meal, have a meal.  If you are not due for a meal have a snack.  This snack helps keeps your blood sugar at a safe range.    Pressure injury of skin of sacral region, unspecified injury stage: Continue with wound care as instructed.   Maintain adequate nutrition, frequent repositioning , offloading with Zfloat boots.  Follow-up with your primary care physician and Wound Care Specialist within 1 week. Call for appointment.    Chronic deep vein thrombosis (DVT) of right lower extremity, unspecified vein: Take your &quot;blood thinners&quot; as prescribed.  Walking is encouraged, increase activity as tolerated.  If you develop new leg pain, swelling, and/or redness contact your healthcare provider.  If you develop new chest pain with difficulty breathing, a rapid heart rate and/or a feeling of passing out call emergency medical services 911.    Prophylactic measure: Prophylactic measure  Dementia without behavioral disturbance, unspecified dementia type: Continue with medications as prescribed by your doctor.  Maintain safe environment.  Maintain adequate nutrition, hydration.  Follow-up with your primary care physician within 1 week. Call for appointment.    Uncomplicated asthma, unspecified asthma severity, unspecified whether persistent: Stable.  Follow-up with your primary care physician within 1 week. Call for appointment.    Sepsis, due to unspecified organism: Take all antibiotics as ordered.  Call you Health care provider upon arrival home to make a one week follow up appointment.  If you develop fever, chills, malaise, or change in mental status call your Health Care Provider or go to the Emergency Department.  Nutrition is important, eat small frequent meals to help ensure you get adequate calories.  Do not stay in bed all day!  Increase your activity daily as tolerated.    PEG tube malfunction: PEG tube malfunction            RECS:        Please call with any questions.    Leigh Ann Resendez DO  [facility] Pulmonary/Sleep Medicine  [phone] PULMONARY PROGRESS NOTE    MYRIAM HEATH  MRN-[id number]    Patient is a 69y old  Female who presents with a chief complaint of PEG tube dislodgement, fever, leukocytosis (16 Jul 2019 07:37)      HPI:  transferre dout of the ICU   ICU course: heparin ggt for DVT (known DVT from prior toa dmisson), started on steriods (she was on prednisone at home that was not restarted while hospitalized) , off pressor support. back on NC.    today- per - no cough. doing well. on 2L NC       ROS:   -    ACTIVE MEDICATION LIST:  MEDICATIONS  (STANDING):  artificial tears (preservative free) Ophthalmic Solution 1 Drop(s) Both EYES four times a day  ascorbic acid 500 milliGRAM(s) Oral daily  BACItracin   Ointment 1 Application(s) Topical two times a day  clonazePAM  Tablet 1 milliGRAM(s) Oral at bedtime  Dakins Solution - 1/4 Strength 1 Application(s) Topical two times a day  dextrose 5%. 1000 milliLiter(s) (50 mL/Hr) IV Continuous <Continuous>  famotidine    Tablet 20 milliGRAM(s) Oral daily  ferrous    sulfate Liquid 300 milliGRAM(s) Enteral Tube daily  formoterol for nebulization 20 MICROGram(s) Nebulizer two times a day  gabapentin   Solution 300 milliGRAM(s) Oral every 12 hours  heparin  Infusion.  Unit(s)/Hr (10 mL/Hr) IV Continuous <Continuous>  hydrocortisone sodium succinate Injectable 25 milliGRAM(s) IV Push every 8 hours  insulin lispro (HumaLOG) corrective regimen sliding scale   SubCutaneous every 6 hours  levothyroxine Injectable 60 MICROGram(s) IV Push at bedtime  Medical Marijuana Awake Oil 2 milliLiter(s),Medical Marijuana Awake Oil 2 milliLiter(s) 2 milliLiter(s) Enteral Tube <User Schedule>  Medical Marijuana Calm Oil 2 milliLiter(s),Medical [place] Calm Oil 2 milliLiter(s) 2 milliLiter(s) Enteral Tube <User Schedule>  Medical Marijuana [place] Oil 2 milliLiter(s),Medical Marijuana [place] Oil 2 milliLiter(s) 2 milliLiter(s) Enteral Tube <User Schedule>  meropenem  IVPB 500 milliGRAM(s) IV Intermittent every 12 hours  multivitamin/minerals/iron Oral Solution (CENTRUM) 15 milliLiter(s) Oral daily  QUEtiapine 25 milliGRAM(s) Oral at bedtime  sodium chloride 0.9%. 1000 milliLiter(s) (75 mL/Hr) IV Continuous <Continuous>  tiZANidine 4 milliGRAM(s) Oral <User Schedule>  zinc sulfate 220 milliGRAM(s) Oral daily    MEDICATIONS  (PRN):  acetaminophen  Suppository .. 650 milliGRAM(s) Rectal every 6 hours PRN Temp greater or equal to 38C (100.4F), Mild Pain (1 - 3)  carboxymethylcellulose 0.5% (Preservative-Free) Ophthalmic Solution 1 Drop(s) Both EYES three times a day PRN dry eyes  glucagon  Injectable 1 milliGRAM(s) IntraMuscular once PRN Glucose LESS THAN 70 milligrams/deciliter  heparin  Injectable 4000 Unit(s) IV Push every 6 hours PRN For aPTT less than 40  heparin  Injectable 2000 Unit(s) IV Push every 6 hours PRN For aPTT between 40 - 57  nystatin Powder 1 Application(s) Topical three times a day PRN under breasts      EXAM:  Vital Signs Last 24 Hrs  T(C): 36.4 (16 Jul 2019 10:00), Max: 37.8 (16 Jul 2019 00:00)  T(F): 97.6 (16 Jul 2019 10:00), Max: 100 (16 Jul 2019 00:00)  HR: 100 (16 Jul 2019 10:00) (100 - 129)  BP: 122/71 (16 Jul 2019 10:00) (120/68 - 159/87)  BP(mean): 91 (16 Jul 2019 00:00) (88 - 113)  RR: 18 (16 Jul 2019 10:00) (16 - 22)  SpO2: 100% (16 Jul 2019 10:00) (94% - 100%)    GENERAL: The patient is asleep in no acute distress     LUNGS: poor air entry; unlabored respirations     HEART: Regular rate and rhythm without murmur.                            8.1    17.76 )-----------( 323      ( 16 Jul 2019 08:57 )             25.4       07-15    130<L>  |  92<L>  |  97<H>  ----------------------------<  156<H>  4.0   |  20<L>  |  2.78<H>    Ca    9.7      15 Jul 2019 00:14  Phos  5.5     07-15  Mg     2.3     07-15    TPro  5.1<L>  /  Alb  2.0<L>  /  TBili  0.2  /  DBili  x   /  AST  12  /  ALT  11  /  AlkPhos  90  07-15        PROBLEM LIST:  69y Female with HEALTH ISSUES - PROBLEM Dx:  Fever: Fever  Type 2 diabetes mellitus without complication, without long-term current use of insulin:   Make sure you get your HgA1c checked every three months.  If you take oral diabetes medications, check your blood glucose two times a day.  If you take insulin, check your blood glucose before meals and at bedtime.  It&#x27;s important not to skip any meals.  Keep a log of your blood glucose results and always take it with you to your doctor appointments.  Keep a list of your current medications including injectables and over the counter medications and bring this medication list with you to all your doctor appointments.  If you have not seen your ophthalmologist this year call for appointment.  Check your feet daily for redness, sores, or openings. Do not self treat. If no improvement in two days call your primary care physician for an appointment.  Low blood sugar (hypoglycemia) is a blood sugar below 70mg/dl. Check your blood sugar if you feel signs/symptoms of hypoglycemia. If your blood sugar is below 70 take 15 grams of carbohydrates (ex 4 oz of apple juice, 3-4 glucose tablets, or 4-6 oz of regular soda) wait 15 minutes and repeat blood sugar to make sure it comes up above 70.  If your blood sugar is above 70 and you are due for a meal, have a meal.  If you are not due for a meal have a snack.  This snack helps keeps your blood sugar at a safe range.    Pressure injury of skin of sacral region, unspecified injury stage: Continue with wound care as instructed.   Maintain adequate nutrition, frequent repositioning , offloading with Zfloat boots.  Follow-up with your primary care physician and Wound Care Specialist within 1 week. Call for appointment.    Chronic deep vein thrombosis (DVT) of right lower extremity, unspecified vein: Take your &quot;blood thinners&quot; as prescribed.  Walking is encouraged, increase activity as tolerated.  If you develop new leg pain, swelling, and/or redness contact your healthcare provider.  If you develop new chest pain with difficulty breathing, a rapid heart rate and/or a feeling of passing out call emergency medical services 911.    Prophylactic measure: Prophylactic measure  Dementia without behavioral disturbance, unspecified dementia type: Continue with medications as prescribed by your doctor.  Maintain safe environment.  Maintain adequate nutrition, hydration.  Follow-up with your primary care physician within 1 week. Call for appointment.    Uncomplicated asthma, unspecified asthma severity, unspecified whether persistent: Stable.  Follow-up with your primary care physician within 1 week. Call for appointment.    Sepsis, due to unspecified organism: Take all antibiotics as ordered.  Call you Health care provider upon arrival home to make a one week follow up appointment.  If you develop fever, chills, malaise, or change in mental status call your Health Care Provider or go to the Emergency Department.  Nutrition is important, eat small frequent meals to help ensure you get adequate calories.  Do not stay in bed all day!  Increase your activity daily as tolerated.    PEG tube malfunction: PEG tube malfunction            RECS:  continue supplemental 02  restart chest PT  neb around the clock  heparin ggt for known DVT  start solucortef taper, suggest to go down to Q 12 hrs.        Please call with any questions.    Leigh Ann Resendez, DO  [facility] Pulmonary/Sleep Medicine  [phone]

## 2019-07-16 NOTE — PROGRESS NOTE ADULT - SUBJECTIVE AND OBJECTIVE BOX
NEPHROLOGY-[facility] [phone]        Patient seen and examined in bed.          MEDICATIONS  (STANDING):  artificial tears (preservative free) Ophthalmic Solution 1 Drop(s) Both EYES four times a day  ascorbic acid 500 milliGRAM(s) Oral daily  BACItracin   Ointment 1 Application(s) Topical two times a day  clonazePAM  Tablet 1 milliGRAM(s) Oral at bedtime  Dakins Solution - 1/4 Strength 1 Application(s) Topical two times a day  dextrose 5%. 1000 milliLiter(s) (50 mL/Hr) IV Continuous <Continuous>  famotidine    Tablet 20 milliGRAM(s) Oral daily  ferrous    sulfate Liquid 300 milliGRAM(s) Enteral Tube daily  formoterol for nebulization 20 MICROGram(s) Nebulizer two times a day  gabapentin   Solution 300 milliGRAM(s) Oral every 12 hours  heparin  Infusion.  Unit(s)/Hr (10 mL/Hr) IV Continuous <Continuous>  hydrocortisone sodium succinate Injectable 25 milliGRAM(s) IV Push every 8 hours  insulin lispro (HumaLOG) corrective regimen sliding scale   SubCutaneous every 6 hours  levothyroxine Injectable 60 MICROGram(s) IV Push at bedtime  Medical Marijuana [place]ake Oil 2 milliLiter(s),Medical Marijuana [place] Oil 2 milliLiter(s) 2 milliLiter(s) Enteral Tube <User Schedule>  Medical Marijuana Calm Oil 2 milliLiter(s),Medical [place] Calm Oil 2 milliLiter(s) 2 milliLiter(s) Enteral Tube <User Schedule>  Medical Marijuana [place] Oil 2 milliLiter(s),Medical Marijuana [place] Oil 2 milliLiter(s) 2 milliLiter(s) Enteral Tube <User Schedule>  meropenem  IVPB 500 milliGRAM(s) IV Intermittent every 12 hours  multivitamin/minerals/iron Oral Solution (CENTRUM) 15 milliLiter(s) Oral daily  QUEtiapine 25 milliGRAM(s) Oral at bedtime  sodium chloride 0.9%. 1000 milliLiter(s) (75 mL/Hr) IV Continuous <Continuous>  tiZANidine 4 milliGRAM(s) Oral <User Schedule>  zinc sulfate 220 milliGRAM(s) Oral daily      VITAL:  T(C): , Max: 37.8 (07-16-19 @ 00:00)  T(F): , Max: 100 (07-16-19 @ 00:00)  HR: 100 (07-16-19 @ 10:00)  BP: 122/71 (07-16-19 @ 10:00)  BP(mean): 91 (07-16-19 @ 00:00)  RR: 18 (07-16-19 @ 10:00)  SpO2: 100% (07-16-19 @ 10:00)  Wt(kg): --    I and O's:    07-15 @ 07:01  -  07-16 @ 07:00  --------------------------------------------------------  IN: 1855 mL / OUT: 240 mL / NET: 1615 mL    07-16 @ 07:01  -  07-16 @ 10:49  --------------------------------------------------------  IN: 120 mL / OUT: 0 mL / NET: 120 mL          PHYSICAL EXAM:    Constitutional: NAD  Neck:  No JVD  Respiratory: CTAB/L  Cardiovascular: S1 and S2  Gastrointestinal: BS+, soft, NT/ND  Extremities: No peripheral edema  Neurological: A/O x 3, no focal deficits  Psychiatric: Normal mood, normal affect  : No Blackman  Skin: No rashes  Access: Not applicable    LABS:                        8.1    17.76 )-----------( 323      ( 16 Jul 2019 08:57 )             25.4     07-15    130<L>  |  92<L>  |  97<H>  ----------------------------<  156<H>  4.0   |  20<L>  |  2.78<H>    Ca    9.7      15 Jul 2019 00:14  Phos  5.5     07-15  Mg     2.3     07-15    TPro  5.1<L>  /  Alb  2.0<L>  /  TBili  0.2  /  DBili  x   /  AST  12  /  ALT  11  /  AlkPhos  90  07-15          Urine Studies:          RADIOLOGY & ADDITIONAL STUDIES:

## 2019-07-16 NOTE — PROGRESS NOTE ADULT - ASSESSMENT
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  Fever and leukocytosis now. Abnormal CT chest.    , patient found with hypoxia and AMS suspect from aspiration event.            aspiration/pna     all cultures negative and temps better    back on meropenem for hypotension   vanco levels still high   hopefully creat will come down off vanco   unresponsive today  reculture for signs  of infection  Her cachexia is severe   completre ab this week  discussed with family

## 2019-07-16 NOTE — PROGRESS NOTE ADULT - SUBJECTIVE AND OBJECTIVE BOX
Patient is a 69y old  Female who presented with a chief complaint of PEG tube dislodgement, fever, leukocytosis (16 Jul 2019 10:43)      INTERVAL HPI/OVERNIGHT EVENTS:  transferred out of MICU  tolerating PEG feeds - Nepro at 20 cc/hour  still lethargic      MEDICATIONS  (STANDING):  artificial tears (preservative free) Ophthalmic Solution 1 Drop(s) Both EYES four times a day  ascorbic acid 500 milliGRAM(s) Oral daily  BACItracin   Ointment 1 Application(s) Topical two times a day  clonazePAM  Tablet 1 milliGRAM(s) Oral at bedtime  Dakins Solution - 1/4 Strength 1 Application(s) Topical two times a day  dextrose 5%. 1000 milliLiter(s) (50 mL/Hr) IV Continuous <Continuous>  famotidine    Tablet 20 milliGRAM(s) Oral daily  ferrous    sulfate Liquid 300 milliGRAM(s) Enteral Tube daily  formoterol for nebulization 20 MICROGram(s) Nebulizer two times a day  gabapentin   Solution 300 milliGRAM(s) Oral every 12 hours  heparin  Infusion.  Unit(s)/Hr (10 mL/Hr) IV Continuous <Continuous>  hydrocortisone sodium succinate Injectable 25 milliGRAM(s) IV Push every 8 hours  insulin lispro (HumaLOG) corrective regimen sliding scale   SubCutaneous every 6 hours  levothyroxine Injectable 60 MICROGram(s) IV Push at bedtime  Medical Marijuana [place]ake Oil 2 milliLiter(s),Medical Marijuana [place] Oil 2 milliLiter(s) 2 milliLiter(s) Enteral Tube <User Schedule>  Medical Marijuana [place] Oil 2 milliLiter(s),Medical [place] Calm Oil 2 milliLiter(s) 2 milliLiter(s) Enteral Tube <User Schedule>  Medical Marijuana [place] Oil 2 milliLiter(s),Medical Marijuana [place] Oil 2 milliLiter(s) 2 milliLiter(s) Enteral Tube <User Schedule>  meropenem  IVPB 500 milliGRAM(s) IV Intermittent every 12 hours  multivitamin/minerals/iron Oral Solution (CENTRUM) 15 milliLiter(s) Oral daily  QUEtiapine 25 milliGRAM(s) Oral at bedtime  sodium chloride 0.9%. 1000 milliLiter(s) (75 mL/Hr) IV Continuous <Continuous>  tiZANidine 4 milliGRAM(s) Oral <User Schedule>  zinc sulfate 220 milliGRAM(s) Oral daily    MEDICATIONS  (PRN):  acetaminophen  Suppository .. 650 milliGRAM(s) Rectal every 6 hours PRN Temp greater or equal to 38C (100.4F), Mild Pain (1 - 3)  carboxymethylcellulose 0.5% (Preservative-Free) Ophthalmic Solution 1 Drop(s) Both EYES three times a day PRN dry eyes  glucagon  Injectable 1 milliGRAM(s) IntraMuscular once PRN Glucose LESS THAN 70 milligrams/deciliter  heparin  Injectable 4000 Unit(s) IV Push every 6 hours PRN For aPTT less than 40  heparin  Injectable 2000 Unit(s) IV Push every 6 hours PRN For aPTT between 40 - 57  nystatin Powder 1 Application(s) Topical three times a day PRN under breasts      Allergies  ASA; dye contrast (Anaphylaxis)  aspirin (Short breath)  divalproex sodium (Other (Unknown))  Flowers and Plants (Short breath)  Haldol (Other (Unknown))  penicillin (Short breath; Rash)  sulfa drugs (Short breath; Rash)  vancomycin (Rash; Urticaria; Hives)  Xanax (Other (Unknown))      Review of Systems:  unable to obtain    Vital Signs Last 24 Hrs  T(C): 36.4 (16 Jul 2019 10:00), Max: 37.8 (16 Jul 2019 00:00)  T(F): 97.6 (16 Jul 2019 10:00), Max: 100 (16 Jul 2019 00:00)  HR: 100 (16 Jul 2019 10:00) (100 - 129)  BP: 122/71 (16 Jul 2019 10:00) (120/68 - 159/87)  BP(mean): 91 (16 Jul 2019 00:00) (88 - 113)  RR: 18 (16 Jul 2019 10:00) (16 - 22)  SpO2: 100% (16 Jul 2019 10:00) (94% - 100%)    PHYSICAL EXAM:  Constitutional: frail elderly chronically ill appearing +severe contractures, non verbal  opens eyes. spouseat bedside  Neck: No JVD  Respiratory: occ rhonchi , decreased BS bases  Cardiovascular: S1 and S2 tachy  Gastrointestinal: BS+, soft, +G tube  high in epigastric area  bumper noted to be at 2-3 cm richard. lightly touching skin and spins freely 360 degrees.    Extremities: anasarca/+edema +dressings in place  Vascular: 2+ peripheral pulses  Neurological: lethargic, no focal asymmetry  Psychiatric: non verbal   Skin: anicteric  +skin dressing and heel pad cushions in place  multiple decubiti (wound care following)    LABS:                        8.1    17.76 )-----------( 323      ( 16 Jul 2019 08:57 )             25.4     07-15    130<L>  |  92<L>  |  97<H>  ----------------------------<  156<H>  4.0   |  20<L>  |  2.78<H>    Ca    9.7      15 Jul 2019 00:14  Phos  5.5     07-15  Mg     2.3     07-15    TPro  5.1<L>  /  Alb  2.0<L>  /  TBili  0.2  /  DBili  x   /  AST  12  /  ALT  11  /  AlkPhos  90  07-15    PT/INR - ( 15 Jul 2019 00:14 )   PT: 12.8 sec;   INR: 1.11 ratio    PTT - ( 15 Jul 2019 00:14 )  PTT:73.0 sec        RADIOLOGY & ADDITIONAL TESTS:

## 2019-07-16 NOTE — PROGRESS NOTE ADULT - SUBJECTIVE AND OBJECTIVE BOX
---___---___---___---___---___---___ ---___---___---___---___---___---___---___---___---                  M E D I C A L   A T T E N D I N G   P R O G R E S S   N O T E  ---___---___---___---___---___---___ ---___---___---___---___---___---___---___---___---        ================================================    ++CHIEF COMPLAINT:   Patient is a 69y old  Female who presents with a chief complaint of PEG tube dislodgement, fever, leukocytosis (2019 10:43)      Gastrostomy malfunction    ---___---___---___---___---___---  PAST MEDICAL / Surgical  HISTORY:  PAST MEDICAL & SURGICAL HISTORY:  Type 2 diabetes mellitus  Dementia  DVT, lower extremity  CVA (cerebral vascular accident)  Fatty pancreas  PNA (pneumonia)  Pulmonary HTN  IGT (impaired glucose tolerance)  Ulcerative colitis  Acid reflux  Anxiety  Depression  Mouth sores  HLD (hyperlipidemia)  Asthma  S/P percutaneous endoscopic gastrostomy (PEG) tube placement: for dysphagia  Humeral head fracture  H/O: hysterectomy  H/O cataract extraction, left  History of knee replacement      ---___---___---___---___---___---  FAMILY HISTORY:   FAMILY HISTORY:  Family history of dementia (Grandparent)  Family history of colon cancer (Grandparent)        ---___---___---___---___---___---  ALLERGIES:   Allergies    ASA; dye contrast (Anaphylaxis)  aspirin (Short breath)  divalproex sodium (Other (Unknown))  Flowers and Plants (Short breath)  Haldol (Other (Unknown))  penicillin (Short breath; Rash)  sulfa drugs (Short breath; Rash)  vancomycin (Rash; Urticaria; Hives)  Xanax (Other (Unknown))    Intolerances        ---___---___---___---___---___---  MEDICATIONS:  MEDICATIONS  (STANDING):  artificial tears (preservative free) Ophthalmic Solution 1 Drop(s) Both EYES four times a day  ascorbic acid 500 milliGRAM(s) Oral daily  BACItracin   Ointment 1 Application(s) Topical two times a day  clonazePAM  Tablet 1 milliGRAM(s) Oral at bedtime  Dakins Solution - 1/4 Strength 1 Application(s) Topical two times a day  dextrose 5%. 1000 milliLiter(s) (50 mL/Hr) IV Continuous <Continuous>  famotidine    Tablet 20 milliGRAM(s) Oral daily  ferrous    sulfate Liquid 300 milliGRAM(s) Enteral Tube daily  formoterol for nebulization 20 MICROGram(s) Nebulizer two times a day  gabapentin   Solution 300 milliGRAM(s) Oral every 12 hours  heparin  Infusion.  Unit(s)/Hr (10 mL/Hr) IV Continuous <Continuous>  hydrocortisone sodium succinate Injectable 25 milliGRAM(s) IV Push every 8 hours  insulin lispro (HumaLOG) corrective regimen sliding scale   SubCutaneous every 6 hours  levothyroxine Injectable 60 MICROGram(s) IV Push at bedtime  Medical Marijuana Awake Oil 2 milliLiter(s),Medical Marijuana Awake Oil 2 milliLiter(s) 2 milliLiter(s) Enteral Tube <User Schedule>  Medical Marijuana Calm Oil 2 milliLiter(s),Medical [place]ju[place] Calm Oil 2 milliLiter(s) 2 milliLiter(s) Enteral Tube <User Schedule>  Medical Marijuana [employer] Oil 2 milliLiter(s),Medical Marijuana [employer] Oil 2 milliLiter(s) 2 milliLiter(s) Enteral Tube <User Schedule>  meropenem  IVPB 500 milliGRAM(s) IV Intermittent every 12 hours  multivitamin/minerals/iron Oral Solution (CENTRUM) 15 milliLiter(s) Oral daily  QUEtiapine 25 milliGRAM(s) Oral at bedtime  sodium chloride 0.9%. 1000 milliLiter(s) (75 mL/Hr) IV Continuous <Continuous>  tiZANidine 4 milliGRAM(s) Oral <User Schedule>  zinc sulfate 220 milliGRAM(s) Oral daily    MEDICATIONS  (PRN):  acetaminophen  Suppository .. 650 milliGRAM(s) Rectal every 6 hours PRN Temp greater or equal to 38C (100.4F), Mild Pain (1 - 3)  carboxymethylcellulose 0.5% (Preservative-Free) Ophthalmic Solution 1 Drop(s) Both EYES three times a day PRN dry eyes  glucagon  Injectable 1 milliGRAM(s) IntraMuscular once PRN Glucose LESS THAN 70 milligrams/deciliter  heparin  Injectable 4000 Unit(s) IV Push every 6 hours PRN For aPTT less than 40  heparin  Injectable 2000 Unit(s) IV Push every 6 hours PRN For aPTT between 40 - 57  nystatin Powder 1 Application(s) Topical three times a day PRN under breasts      ---___---___---___---___---___---  REVIEW OF SYSTEM:    unable to obtain     ---___---___---___---___---___---  VITAL SIGNS:  69y , CAPILLARY BLOOD GLUCOSE      POCT Blood Glucose.: 167 mg/dL (2019 06:50)    T(C): 36.4 (19 @ 10:00), Max: 37.8 (19 @ 00:00)  HR: 100 (19 @ 10:00) (100 - 129)  BP: 122/71 (19 @ 10:00) (120/68 - 159/87)  RR: 18 (19 @ 10:00) (16 - 22)  SpO2: 100% (19 @ 10:00) (94% - 100%)  ---___---___---___---___---___---  PHYSICAL EXAM:    GEN: A&O X 0 , NAD , comfortable  HEENT: NCAT, PERRL, MMM, hearing intact  Neck: supple , no JVD  CVS: S1S2 , regular , No M/R/G appreciated  PULM: decreased breat sounds noted   ABD.: soft. non tender, non distended,  bowel sounds present peg noted   Extrem: intact pulses ,  edema  to lower extemities  Derm: No rash , no ecchymoses  stage 4 sacral decubitus      ---___---___---___---___---___---            LAB AND IMAGIN.1    17.76 )-----------( 323      ( 2019 08:57 )             25.4               07-15    130<L>  |  92<L>  |  97<H>  ----------------------------<  156<H>  4.0   |  20<L>  |  2.78<H>    Ca    9.7      15 Jul 2019 00:14  Phos  5.5     07-15  Mg     2.3     07-15    TPro  5.1<L>  /  Alb  2.0<L>  /  TBili  0.2  /  DBili  x   /  AST  12  /  ALT  11  /  AlkPhos  90  07-15    PT/INR - ( 15 Jul 2019 00:14 )   PT: 12.8 sec;   INR: 1.11 ratio         PTT - ( 15 Jul 2019 00:14 )  PTT:73.0 sec                            [All pertinent / recent Imaging reviewed]         ---___---___---___---___---___---___ ---___---___---___---___---                         A S S E S S M E N T   A N D   P L A N :      acute renal insufficiency  trend labs and follow cr   renal following    diarrhea check stool for cdiff on merrem   hypernatremia should correct with libia  improvement  edema add bnp if needed lasix to diureses if renal agrees  agitation on seroquel and klonopin   chronic pain on medical marijuana an neurontin  dm2 on from current steroid use on insulin lispro   hypothyroid on synthroid     spoke at length with                    -GI/DVT Prophylaxis.    --------------------------------------------  Case discussed with   Education given on   ___________________________  Thank you,  Deniz Bolden  [phone]

## 2019-07-17 LAB
ANION GAP SERPL CALC-SCNC: 22 MMOL/L — HIGH (ref 5–17)
APTT BLD: 176.1 SEC — CRITICAL HIGH (ref 27.5–36.3)
APTT BLD: 31.7 SEC — SIGNIFICANT CHANGE UP (ref 27.5–36.3)
BUN SERPL-MCNC: 108 MG/DL — HIGH (ref 7–23)
CALCIUM SERPL-MCNC: 9.4 MG/DL — SIGNIFICANT CHANGE UP (ref 8.4–10.5)
CHLORIDE SERPL-SCNC: 93 MMOL/L — LOW (ref 96–108)
CO2 SERPL-SCNC: 16 MMOL/L — LOW (ref 22–31)
CREAT SERPL-MCNC: 3.01 MG/DL — HIGH (ref 0.5–1.3)
CULTURE RESULTS: SIGNIFICANT CHANGE UP
CULTURE RESULTS: SIGNIFICANT CHANGE UP
GLUCOSE BLDC GLUCOMTR-MCNC: 129 MG/DL — HIGH (ref 70–99)
GLUCOSE BLDC GLUCOMTR-MCNC: 153 MG/DL — HIGH (ref 70–99)
GLUCOSE BLDC GLUCOMTR-MCNC: 180 MG/DL — HIGH (ref 70–99)
GLUCOSE BLDC GLUCOMTR-MCNC: 197 MG/DL — HIGH (ref 70–99)
GLUCOSE SERPL-MCNC: 154 MG/DL — HIGH (ref 70–99)
HCT VFR BLD CALC: 26.6 % — LOW (ref 34.5–45)
HGB BLD-MCNC: 8.3 G/DL — LOW (ref 11.5–15.5)
MCHC RBC-ENTMCNC: 28.8 PG — SIGNIFICANT CHANGE UP (ref 27–34)
MCHC RBC-ENTMCNC: 31.2 GM/DL — LOW (ref 32–36)
MCV RBC AUTO: 92.4 FL — SIGNIFICANT CHANGE UP (ref 80–100)
PLATELET # BLD AUTO: 395 K/UL — SIGNIFICANT CHANGE UP (ref 150–400)
POTASSIUM SERPL-MCNC: 4.1 MMOL/L — SIGNIFICANT CHANGE UP (ref 3.5–5.3)
POTASSIUM SERPL-SCNC: 4.1 MMOL/L — SIGNIFICANT CHANGE UP (ref 3.5–5.3)
RBC # BLD: 2.88 M/UL — LOW (ref 3.8–5.2)
RBC # FLD: 14.8 % — HIGH (ref 10.3–14.5)
SODIUM SERPL-SCNC: 131 MMOL/L — LOW (ref 135–145)
SPECIMEN SOURCE: SIGNIFICANT CHANGE UP
SPECIMEN SOURCE: SIGNIFICANT CHANGE UP
VANCOMYCIN TROUGH SERPL-MCNC: 17.5 UG/ML — SIGNIFICANT CHANGE UP (ref 10–20)
WBC # BLD: 22.38 K/UL — HIGH (ref 3.8–10.5)
WBC # FLD AUTO: 22.38 K/UL — HIGH (ref 3.8–10.5)

## 2019-07-17 PROCEDURE — 99232 SBSQ HOSP IP/OBS MODERATE 35: CPT

## 2019-07-17 PROCEDURE — 99233 SBSQ HOSP IP/OBS HIGH 50: CPT

## 2019-07-17 PROCEDURE — 93010 ELECTROCARDIOGRAM REPORT: CPT

## 2019-07-17 PROCEDURE — 71045 X-RAY EXAM CHEST 1 VIEW: CPT | Mod: 26

## 2019-07-17 RX ORDER — APIXABAN 2.5 MG/1
2.5 TABLET, FILM COATED ORAL
Refills: 0 | Status: DISCONTINUED | OUTPATIENT
Start: 2019-07-17 | End: 2019-08-07

## 2019-07-17 RX ORDER — METOPROLOL TARTRATE 50 MG
25 TABLET ORAL ONCE
Refills: 0 | Status: DISCONTINUED | OUTPATIENT
Start: 2019-07-17 | End: 2019-07-17

## 2019-07-17 RX ORDER — FUROSEMIDE 40 MG
10 TABLET ORAL
Qty: 500 | Refills: 0 | Status: DISCONTINUED | OUTPATIENT
Start: 2019-07-17 | End: 2019-07-19

## 2019-07-17 RX ORDER — METOPROLOL TARTRATE 50 MG
25 TABLET ORAL ONCE
Refills: 0 | Status: COMPLETED | OUTPATIENT
Start: 2019-07-17 | End: 2019-07-17

## 2019-07-17 RX ORDER — APIXABAN 2.5 MG/1
2.5 TABLET, FILM COATED ORAL ONCE
Refills: 0 | Status: COMPLETED | OUTPATIENT
Start: 2019-07-17 | End: 2019-07-17

## 2019-07-17 RX ORDER — HYDROCORTISONE 20 MG
25 TABLET ORAL EVERY 12 HOURS
Refills: 0 | Status: DISCONTINUED | OUTPATIENT
Start: 2019-07-17 | End: 2019-07-22

## 2019-07-17 RX ORDER — FUROSEMIDE 40 MG
120 TABLET ORAL ONCE
Refills: 0 | Status: COMPLETED | OUTPATIENT
Start: 2019-07-17 | End: 2019-07-17

## 2019-07-17 RX ORDER — NYSTATIN CREAM 100000 [USP'U]/G
1 CREAM TOPICAL
Refills: 0 | Status: DISCONTINUED | OUTPATIENT
Start: 2019-07-17 | End: 2019-07-17

## 2019-07-17 RX ADMIN — Medication 20 MICROGRAM(S): at 07:51

## 2019-07-17 RX ADMIN — QUETIAPINE FUMARATE 25 MILLIGRAM(S): 200 TABLET, FILM COATED ORAL at 22:48

## 2019-07-17 RX ADMIN — MEROPENEM 100 MILLIGRAM(S): 1 INJECTION INTRAVENOUS at 17:17

## 2019-07-17 RX ADMIN — MEROPENEM 100 MILLIGRAM(S): 1 INJECTION INTRAVENOUS at 05:14

## 2019-07-17 RX ADMIN — GABAPENTIN 300 MILLIGRAM(S): 400 CAPSULE ORAL at 22:48

## 2019-07-17 RX ADMIN — Medication 1 DROP(S): at 17:16

## 2019-07-17 RX ADMIN — TIZANIDINE 4 MILLIGRAM(S): 4 TABLET ORAL at 22:11

## 2019-07-17 RX ADMIN — HEPARIN SODIUM 0 UNIT(S)/HR: 5000 INJECTION INTRAVENOUS; SUBCUTANEOUS at 08:33

## 2019-07-17 RX ADMIN — Medication 1: at 12:58

## 2019-07-17 RX ADMIN — Medication 1: at 05:15

## 2019-07-17 RX ADMIN — HEPARIN SODIUM 1200 UNIT(S)/HR: 5000 INJECTION INTRAVENOUS; SUBCUTANEOUS at 00:57

## 2019-07-17 RX ADMIN — Medication 300 MILLIGRAM(S): at 12:41

## 2019-07-17 RX ADMIN — Medication 1 MILLIGRAM(S): at 22:47

## 2019-07-17 RX ADMIN — HEPARIN SODIUM 1000 UNIT(S)/HR: 5000 INJECTION INTRAVENOUS; SUBCUTANEOUS at 09:40

## 2019-07-17 RX ADMIN — APIXABAN 2.5 MILLIGRAM(S): 2.5 TABLET, FILM COATED ORAL at 10:34

## 2019-07-17 RX ADMIN — Medication 60 MICROGRAM(S): at 22:11

## 2019-07-17 RX ADMIN — Medication 20 MICROGRAM(S): at 18:33

## 2019-07-17 RX ADMIN — Medication 1 APPLICATION(S): at 17:16

## 2019-07-17 RX ADMIN — Medication 1 APPLICATION(S): at 05:11

## 2019-07-17 RX ADMIN — FAMOTIDINE 20 MILLIGRAM(S): 10 INJECTION INTRAVENOUS at 12:41

## 2019-07-17 RX ADMIN — Medication 1 APPLICATION(S): at 17:14

## 2019-07-17 RX ADMIN — Medication 1 APPLICATION(S): at 05:13

## 2019-07-17 RX ADMIN — Medication 5 MG/HR: at 18:33

## 2019-07-17 RX ADMIN — Medication 25 MILLIGRAM(S): at 18:33

## 2019-07-17 RX ADMIN — Medication 500 MILLIGRAM(S): at 12:41

## 2019-07-17 RX ADMIN — HEPARIN SODIUM 4000 UNIT(S): 5000 INJECTION INTRAVENOUS; SUBCUTANEOUS at 00:59

## 2019-07-17 RX ADMIN — APIXABAN 2.5 MILLIGRAM(S): 2.5 TABLET, FILM COATED ORAL at 17:30

## 2019-07-17 RX ADMIN — Medication 1 DROP(S): at 05:12

## 2019-07-17 RX ADMIN — Medication 15 MILLILITER(S): at 12:42

## 2019-07-17 RX ADMIN — Medication 1: at 23:50

## 2019-07-17 RX ADMIN — Medication 1 DROP(S): at 12:41

## 2019-07-17 RX ADMIN — GABAPENTIN 300 MILLIGRAM(S): 400 CAPSULE ORAL at 09:06

## 2019-07-17 RX ADMIN — Medication 25 MILLIGRAM(S): at 17:15

## 2019-07-17 RX ADMIN — TIZANIDINE 4 MILLIGRAM(S): 4 TABLET ORAL at 09:06

## 2019-07-17 RX ADMIN — Medication 124 MILLIGRAM(S): at 12:40

## 2019-07-17 RX ADMIN — Medication 25 MILLIGRAM(S): at 05:16

## 2019-07-17 RX ADMIN — ZINC SULFATE TAB 220 MG (50 MG ZINC EQUIVALENT) 220 MILLIGRAM(S): 220 (50 ZN) TAB at 12:42

## 2019-07-17 NOTE — CHART NOTE - NSCHARTNOTEFT_GEN_A_CORE
Notified from signout patient received PO lopressor 25 x1 for  throughout day. EKG shows sinus tach. Repeat   /95 s/p lopressor. Afebrile. New RUL opacity noted on CXR.     D/w Dr. Bolden  PO lopressor 25 x1 ordered and to contact Dr. Whitehead (cardiology) for further recommendations.   Continue meropenem and ID f/u in AM    will continue to monitor   will endorse to day team     Bhargavi Soto PA-C   [id number] Notified from signout patient received PO lopressor 25 x1 for  throughout day. EKG shows sinus tach. Repeat   /95 s/p lopressor. Afebrile. New RUL opacity noted on CXR.     D/w Dr. Bolden  PO lopressor 25 x1 ordered and to contact Dr. Whitehead (cardiology) for further recommendations.   Continue meropenem and ID f/u in AM    will continue to monitor   will endorse to day team     Bhargavi Soto PA-C   [id number]    Addendum at 12AM:   Repeat  and BP 99/65. Discontinued lopressor. D/w Dr. Whitehead. Continue current regimen and will f/u in AM.     Will continue to monitor   will endorse to day team     Bhargavi Soto PA-C   [id number] Notified from signout patient received PO lopressor 25 x1 for  throughout day. EKG shows sinus tach. Repeat   /95 s/p lopressor. Afebrile. New RUL opacity noted on CXR.     D/w Dr. Bolden  PO lopressor 25 x1 ordered and to contact Dr. Whitehead (cardiology) for further recommendations.   Continue meropenem and ID f/u in AM    will continue to monitor   will endorse to day team     Bhargavi Soto PA-C   [id number]    Addendum at 12AM:   Repeat  and BP 99/65. Discontinued lopressor. D/w Dr. Whitehead. Continue current regimen and will f/u in AM.     Will continue to monitor   will endorse to day team     Bhargavi Soto PA-C   [id number]    Addendum at 640AM:   Notified by RN  this morning. No changes from baseline.     Vital Signs Last 24 Hrs  T(C): 36.9 (18 Jul 2019 05:00), Max: 36.9 (17 Jul 2019 13:00)  T(F): 98.5 (18 Jul 2019 05:00), Max: 98.5 (17 Jul 2019 23:00)  HR: 132 (18 Jul 2019 05:00) (112 - 132)  BP: 139/90 (18 Jul 2019 05:00) (99/65 - 143/90)  BP(mean): --  RR: 18 (18 Jul 2019 05:00) (18 - 18)  SpO2: 99% (18 Jul 2019 05:00) (98% - 100%)    Lopressor 25 PO x1 as per Dr. Whitehead and continue to monitor     will continue to monitor   will endorse to day team     Bhargavi Soto PA-C   [id number]

## 2019-07-17 NOTE — PROGRESS NOTE ADULT - ASSESSMENT
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 since admission, went to IR to have PEG replaced.  TRANG and hyperkalemia.    1. TRANG likely multifactorial with vancomycin clearly a contributing factor.  Creatinine still rising as expected with high vanco levels as well.   2. Hyponatremia from TRANG  3. Hypercalcemia, stable   4. Anemia. Hgb improved.   5. Volume overloaded    RECOMMEND:  - Lasix 120mg IVP x 1 to augment urine outpt.  I am weary to use zaroxolyn with her sulfa allergy  -  Continue to hold Vanco and trend level for am.  I suspect when the level approaches around 15 the effects of the vanco will иван provided no further hemodynamic compromises occur  - Continue TF with Nephro till the creatinine improves    - BMP daily  - Monitor I's and O's and daily weights  -  I hope we can avoid HD in the frail lady.  Again if HD is needed it will only be started as a transient procedure till ATN improves.  She is not a candidate for permanent HD     I have briefly mentioned the concept to the  but stopped short of offering. Hopefully she urinates

## 2019-07-17 NOTE — PROGRESS NOTE ADULT - SUBJECTIVE AND OBJECTIVE BOX
Patient is a 69y old  Female who presented with a chief complaint of PEG tube dislodgement, fever, leukocytosis (17 Jul 2019 08:18)      INTERVAL HPI/OVERNIGHT EVENTS:  still with significant secretions  tolerating G tube feeds  no rectal bleeding or melena  no bleeding at PEG site    MEDICATIONS  (STANDING):  apixaban 2.5 milliGRAM(s) Oral two times a day  apixaban 2.5 milliGRAM(s) Oral once  artificial tears (preservative free) Ophthalmic Solution 1 Drop(s) Both EYES four times a day  ascorbic acid 500 milliGRAM(s) Oral daily  BACItracin   Ointment 1 Application(s) Topical two times a day  clonazePAM  Tablet 1 milliGRAM(s) Oral at bedtime  Dakins Solution - 1/4 Strength 1 Application(s) Topical two times a day  dextrose 5%. 1000 milliLiter(s) (50 mL/Hr) IV Continuous <Continuous>  famotidine    Tablet 20 milliGRAM(s) Oral daily  ferrous    sulfate Liquid 300 milliGRAM(s) Enteral Tube daily  formoterol for nebulization 20 MICROGram(s) Nebulizer two times a day  gabapentin   Solution 300 milliGRAM(s) Oral every 12 hours  heparin  Infusion.  Unit(s)/Hr (10 mL/Hr) IV Continuous <Continuous>  hydrocortisone sodium succinate Injectable 25 milliGRAM(s) IV Push every 12 hours  insulin lispro (HumaLOG) corrective regimen sliding scale   SubCutaneous every 6 hours  levothyroxine Injectable 60 MICROGram(s) IV Push at bedtime  Medical Marijuana Awake Oil 2 milliLiter(s),Medical Marijuana Awake Oil 2 milliLiter(s) 2 milliLiter(s) Enteral Tube <User Schedule>  Medical Marijuana Calm Oil 2 milliLiter(s),Medical [place] Calm Oil 2 milliLiter(s) 2 milliLiter(s) Enteral Tube <User Schedule>  Medical Marijuana [place] Oil 2 milliLiter(s),Medical Marijuana [place] Oil 2 milliLiter(s) 2 milliLiter(s) Enteral Tube <User Schedule>  meropenem  IVPB 500 milliGRAM(s) IV Intermittent every 12 hours  multivitamin/minerals/iron Oral Solution (CENTRUM) 15 milliLiter(s) Oral daily  QUEtiapine 25 milliGRAM(s) Oral at bedtime  sodium chloride 0.9%. 1000 milliLiter(s) (75 mL/Hr) IV Continuous <Continuous>  tiZANidine 4 milliGRAM(s) Oral <User Schedule>  zinc sulfate 220 milliGRAM(s) Oral daily    MEDICATIONS  (PRN):  acetaminophen  Suppository .. 650 milliGRAM(s) Rectal every 6 hours PRN Temp greater or equal to 38C (100.4F), Mild Pain (1 - 3)  carboxymethylcellulose 0.5% (Preservative-Free) Ophthalmic Solution 1 Drop(s) Both EYES three times a day PRN dry eyes  glucagon  Injectable 1 milliGRAM(s) IntraMuscular once PRN Glucose LESS THAN 70 milligrams/deciliter  heparin  Injectable 4000 Unit(s) IV Push every 6 hours PRN For aPTT less than 40  heparin  Injectable 2000 Unit(s) IV Push every 6 hours PRN For aPTT between 40 - 57  nystatin Powder 1 Application(s) Topical three times a day PRN under breasts      Allergies  ASA; dye contrast (Anaphylaxis)  aspirin (Short breath)  divalproex sodium (Other (Unknown))  Flowers and Plants (Short breath)  Haldol (Other (Unknown))  penicillin (Short breath; Rash)  sulfa drugs (Short breath; Rash)  vancomycin (Rash; Urticaria; Hives)  Xanax (Other (Unknown))      Review of Systems:  unable to obtain    Vital Signs Last 24 Hrs  T(C): 36.4 (17 Jul 2019 05:00), Max: 37 (16 Jul 2019 22:28)  T(F): 97.5 (17 Jul 2019 05:00), Max: 98.6 (16 Jul 2019 22:28)  HR: 111 (17 Jul 2019 05:00) (111 - 122)  BP: 144/89 (17 Jul 2019 05:00) (119/71 - 144/89)  BP(mean): --  RR: 18 (17 Jul 2019 05:00) (18 - 18)  SpO2: 99% (17 Jul 2019 05:00) (93% - 99%)    PHYSICAL EXAM:  Constitutional: frail elderly chronically ill appearing +severe contractures, non verbal  opens eyes. spouseat bedside +anasarca  Neck: No JVD  Respiratory: + rhonchi , decreased BS bases  Cardiovascular: S1 and S2 tachy  Gastrointestinal: BS+, soft, +G tube  high in epigastric area  bumper noted to be at 2-3 cm richard. lightly touching skin and spins freely 360 degrees.    Extremities: anasarca/+edema +dressings in place  Vascular: 2+ peripheral pulses  Neurological: lethargic, no focal asymmetry  Psychiatric: non verbal   Skin: anicteric  +skin dressings and heel pad cushions in place  multiple decubiti (wound care following)    LABS:                        8.3    22.38 )-----------( 395      ( 17 Jul 2019 09:27 )             26.6     Hemoglobin: 8.1 g/dL <L> [11.5 - 15.5] (07-16 @ 08:57)  Hemoglobin: 8.5 g/dL <L> [11.5 - 15.5] (07-15 @ 00:14)    WBC Count: 17.76 K/uL (07.16.19 @ 08:57)        07-17    131<L>  |  93<L>  |  108<H>  ----------------------------<  154<H>  4.1   |  16<L>  |  3.01<H>    Ca    9.4      17 Jul 2019 07:07      PTT - ( 17 Jul 2019 07:07 )  PTT:176.1 sec          RADIOLOGY & ADDITIONAL TESTS:

## 2019-07-17 NOTE — PROGRESS NOTE ADULT - ASSESSMENT
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 Since admission, went to IR to have PEG replaced (6/25/19)  +fever and RRT over weekend for suspected aspiration event, and with Renal Insufficiency    -Discussed option of GJ tube but explained high risk of clogging and cannot do bolus feeds and pt would still be at risk for aspiration from secretions as she is presently with G tube (spouse and family do not want to explore GJ conversion option)    s/p RRT on 7/13 for hypotension in the SBP 50-60s and hypoxia in the 80s, admitted to MICU for shock requiring IV pressor support. Now stable off pressors, on NC. On stress steroids dosing  DVT on AC    RECS:  -keep G tube bumper at ~2-3 cm richard on G tube.  -G tube feeds  -wound care  -Abx as per ID  -monitor BMs  -Continue Pepcid    Lengthy discussion with family at bedside, all questions answered      Adam Miller PA-C    [facility]  [phone]  After hours and weekend coverage [phone]

## 2019-07-17 NOTE — PROGRESS NOTE ADULT - SUBJECTIVE AND OBJECTIVE BOX
infectious diseases progress note:    Patient is a 69y old  Female who presents with a chief complaint of PEG tube dislodgement, fever, leukocytosis (17 Jul 2019 07:10)        Gastrostomy malfunction             Allergies    ASA; dye contrast (Anaphylaxis)  aspirin (Short breath)  divalproex sodium (Other (Unknown))  Flowers and Plants (Short breath)  Haldol (Other (Unknown))  penicillin (Short breath; Rash)  sulfa drugs (Short breath; Rash)  vancomycin (Rash; Urticaria; Hives)  Xanax (Other (Unknown))    Intolerances        ANTIBIOTICS/RELEVANT:  antimicrobials  meropenem  IVPB 500 milliGRAM(s) IV Intermittent every 12 hours    immunologic:    OTHER:  acetaminophen  Suppository .. 650 milliGRAM(s) Rectal every 6 hours PRN  artificial tears (preservative free) Ophthalmic Solution 1 Drop(s) Both EYES four times a day  ascorbic acid 500 milliGRAM(s) Oral daily  BACItracin   Ointment 1 Application(s) Topical two times a day  carboxymethylcellulose 0.5% (Preservative-Free) Ophthalmic Solution 1 Drop(s) Both EYES three times a day PRN  clonazePAM  Tablet 1 milliGRAM(s) Oral at bedtime  Dakins Solution - 1/4 Strength 1 Application(s) Topical two times a day  dextrose 5%. 1000 milliLiter(s) IV Continuous <Continuous>  famotidine    Tablet 20 milliGRAM(s) Oral daily  ferrous    sulfate Liquid 300 milliGRAM(s) Enteral Tube daily  formoterol for nebulization 20 MICROGram(s) Nebulizer two times a day  gabapentin   Solution 300 milliGRAM(s) Oral every 12 hours  glucagon  Injectable 1 milliGRAM(s) IntraMuscular once PRN  heparin  Infusion.  Unit(s)/Hr IV Continuous <Continuous>  heparin  Injectable 4000 Unit(s) IV Push every 6 hours PRN  heparin  Injectable 2000 Unit(s) IV Push every 6 hours PRN  hydrocortisone sodium succinate Injectable 25 milliGRAM(s) IV Push every 8 hours  insulin lispro (HumaLOG) corrective regimen sliding scale   SubCutaneous every 6 hours  levothyroxine Injectable 60 MICROGram(s) IV Push at bedtime  Medical Marijuana Awake Oil 2 milliLiter(s),Medical Marijuana Awake Oil 2 milliLiter(s) 2 milliLiter(s) Enteral Tube <User Schedule>  Medical Marijuana Calm Oil 2 milliLiter(s),Medical Marinjuana Calm Oil 2 milliLiter(s) 2 milliLiter(s) Enteral Tube <User Schedule>  Medical Marijuana [place] Oil 2 milliLiter(s),Medical Marijuana [place] Oil 2 milliLiter(s) 2 milliLiter(s) Enteral Tube <User Schedule>  multivitamin/minerals/iron Oral Solution (CENTRUM) 15 milliLiter(s) Oral daily  nystatin Powder 1 Application(s) Topical three times a day PRN  QUEtiapine 25 milliGRAM(s) Oral at bedtime  sodium chloride 0.9%. 1000 milliLiter(s) IV Continuous <Continuous>  tiZANidine 4 milliGRAM(s) Oral <User Schedule>  zinc sulfate 220 milliGRAM(s) Oral daily      Objective:  Vital Signs Last 24 Hrs  T(C): 36.4 (17 Jul 2019 05:00), Max: 37 (16 Jul 2019 22:28)  T(F): 97.5 (17 Jul 2019 05:00), Max: 98.6 (16 Jul 2019 22:28)  HR: 111 (17 Jul 2019 05:00) (100 - 122)  BP: 144/89 (17 Jul 2019 05:00) (119/71 - 144/89)  BP(mean): --  RR: 18 (17 Jul 2019 05:00) (18 - 18)  SpO2: 99% (17 Jul 2019 05:00) (93% - 100%)       Eyes:JACQUELIN, EOMI  Ear/Nose/Throat: no oral lesion, no sinus tenderness on percussion	  Neck:no JVD, no lymphadenopathy, supple  Respiratory: CTA maryjane  Cardiovascular: S1S2 RRR, no murmurs  Gastrointestinal:soft, (+) BS, no HSM   c        LABS:                        8.1    17.76 )-----------( 323      ( 16 Jul 2019 08:57 )             25.4     07-17    131<L>  |  93<L>  |  108<H>  ----------------------------<  154<H>  4.1   |  16<L>  |  3.01<H>    Ca    9.4      17 Jul 2019 07:07      PTT - ( 17 Jul 2019 07:07 )  PTT:176.1 sec        MICROBIOLOGY:    RECENT CULTURES:  07-13 @ 10:20 .Urine                >=3 organisms. Probable collection contamination.    07-12 @ 18:59 .Blood                No growth to date.    07-12 @ 16:59 .Blood                No growth to date.          RESPIRATORY CULTURES:              RADIOLOGY & ADDITIONAL STUDIES:        Pager [phone]  After 5 pm/weekends or if no response :[phone]

## 2019-07-17 NOTE — PROGRESS NOTE ADULT - ASSESSMENT
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  Fever and leukocytosis now. Abnormal CT chest.    , patient found with hypoxia and AMS suspect from aspiration event.            aspiration/pna     all cultures negative and temps better    back on meropenem for hypotension     hopefully creat will come down off vanco   unresponsive today  reculture for signs  of infection  Her cachexia is severe   complete ab this week  discussed with family   seems more sob  for chest xray but may be fluid overloaded

## 2019-07-17 NOTE — PROGRESS NOTE ADULT - SUBJECTIVE AND OBJECTIVE BOX
PULMONARY PROGRESS NOTE    MYRIAM HEATH  MRN-[id number]    Patient is a 69y old  Female who presents with a chief complaint of PEG tube dislodgement, fever, leukocytosis (17 Jul 2019 10:40)      HPI:  transitioned up to 3L NC  per daughter- no cough.  less responsive     ROS:   -    ACTIVE MEDICATION LIST:  MEDICATIONS  (STANDING):  apixaban 2.5 milliGRAM(s) Oral two times a day  artificial tears (preservative free) Ophthalmic Solution 1 Drop(s) Both EYES four times a day  ascorbic acid 500 milliGRAM(s) Oral daily  BACItracin   Ointment 1 Application(s) Topical two times a day  clonazePAM  Tablet 1 milliGRAM(s) Oral at bedtime  Dakins Solution - 1/4 Strength 1 Application(s) Topical two times a day  dextrose 5%. 1000 milliLiter(s) (50 mL/Hr) IV Continuous <Continuous>  famotidine    Tablet 20 milliGRAM(s) Oral daily  ferrous    sulfate Liquid 300 milliGRAM(s) Enteral Tube daily  formoterol for nebulization 20 MICROGram(s) Nebulizer two times a day  gabapentin   Solution 300 milliGRAM(s) Oral every 12 hours  hydrocortisone sodium succinate Injectable 25 milliGRAM(s) IV Push every 12 hours  insulin lispro (HumaLOG) corrective regimen sliding scale   SubCutaneous every 6 hours  levothyroxine Injectable 60 MICROGram(s) IV Push at bedtime  Medical Marijuana Awake Oil 2 milliLiter(s),Medical Marijuana Awake Oil 2 milliLiter(s) 2 milliLiter(s) Enteral Tube <User Schedule>  Medical Marijuana Calm Oil 2 milliLiter(s),Medical Marinjuana Calm Oil 2 milliLiter(s) 2 milliLiter(s) Enteral Tube <User Schedule>  Medical Marijuana [place] Oil 2 milliLiter(s),Medical Marijuana [place] Oil 2 milliLiter(s) 2 milliLiter(s) Enteral Tube <User Schedule>  meropenem  IVPB 500 milliGRAM(s) IV Intermittent every 12 hours  multivitamin/minerals/iron Oral Solution (CENTRUM) 15 milliLiter(s) Oral daily  QUEtiapine 25 milliGRAM(s) Oral at bedtime  sodium chloride 0.9%. 1000 milliLiter(s) (75 mL/Hr) IV Continuous <Continuous>  tiZANidine 4 milliGRAM(s) Oral <User Schedule>  zinc sulfate 220 milliGRAM(s) Oral daily    MEDICATIONS  (PRN):  acetaminophen  Suppository .. 650 milliGRAM(s) Rectal every 6 hours PRN Temp greater or equal to 38C (100.4F), Mild Pain (1 - 3)  carboxymethylcellulose 0.5% (Preservative-Free) Ophthalmic Solution 1 Drop(s) Both EYES three times a day PRN dry eyes  glucagon  Injectable 1 milliGRAM(s) IntraMuscular once PRN Glucose LESS THAN 70 milligrams/deciliter  nystatin Powder 1 Application(s) Topical three times a day PRN under breasts      EXAM:  Vital Signs Last 24 Hrs  T(C): 36.9 (17 Jul 2019 13:00), Max: 37 (16 Jul 2019 22:28)  T(F): 98.4 (17 Jul 2019 13:00), Max: 98.6 (16 Jul 2019 22:28)  HR: 130 (17 Jul 2019 13:00) (111 - 130)  BP: 143/90 (17 Jul 2019 13:00) (119/71 - 144/89)  BP(mean): --  RR: 18 (17 Jul 2019 13:00) (18 - 18)  SpO2: 99% (17 Jul 2019 13:00) (93% - 99%)    GENERAL: The patient is lethargic    LUNGS: poor effort respirations unlabored    HEART: Regular rate and rhythm without murmur.                            8.3    22.38 )-----------( 395      ( 17 Jul 2019 09:27 )             26.6       07-17    131<L>  |  93<L>  |  108<H>  ----------------------------<  154<H>  4.1   |  16<L>  |  3.01<H>    Ca    9.4      17 Jul 2019 07:07          PROBLEM LIST:  69y Female with HEALTH ISSUES - PROBLEM Dx:  Fever: Fever  Type 2 diabetes mellitus without complication, without long-term current use of insulin:   Make sure you get your HgA1c checked every three months.  If you take oral diabetes medications, check your blood glucose two times a day.  If you take insulin, check your blood glucose before meals and at bedtime.  It&#x27;s important not to skip any meals.  Keep a log of your blood glucose results and always take it with you to your doctor appointments.  Keep a list of your current medications including injectables and over the counter medications and bring this medication list with you to all your doctor appointments.  If you have not seen your ophthalmologist this year call for appointment.  Check your feet daily for redness, sores, or openings. Do not self treat. If no improvement in two days call your primary care physician for an appointment.  Low blood sugar (hypoglycemia) is a blood sugar below 70mg/dl. Check your blood sugar if you feel signs/symptoms of hypoglycemia. If your blood sugar is below 70 take 15 grams of carbohydrates (ex 4 oz of apple juice, 3-4 glucose tablets, or 4-6 oz of regular soda) wait 15 minutes and repeat blood sugar to make sure it comes up above 70.  If your blood sugar is above 70 and you are due for a meal, have a meal.  If you are not due for a meal have a snack.  This snack helps keeps your blood sugar at a safe range.    Pressure injury of skin of sacral region, unspecified injury stage: Continue with wound care as instructed.   Maintain adequate nutrition, frequent repositioning , offloading with Zfloat boots.  Follow-up with your primary care physician and Wound Care Specialist within 1 week. Call for appointment.    Chronic deep vein thrombosis (DVT) of right lower extremity, unspecified vein: Take your &quot;blood thinners&quot; as prescribed.  Walking is encouraged, increase activity as tolerated.  If you develop new leg pain, swelling, and/or redness contact your healthcare provider.  If you develop new chest pain with difficulty breathing, a rapid heart rate and/or a feeling of passing out call emergency medical services 911.    Prophylactic measure: Prophylactic measure  Dementia without behavioral disturbance, unspecified dementia type: Continue with medications as prescribed by your doctor.  Maintain safe environment.  Maintain adequate nutrition, hydration.  Follow-up with your primary care physician within 1 week. Call for appointment.    Uncomplicated asthma, unspecified asthma severity, unspecified whether persistent: Stable.  Follow-up with your primary care physician within 1 week. Call for appointment.    Sepsis, due to unspecified organism: Take all antibiotics as ordered.  Call you Health care provider upon arrival home to make a one week follow up appointment.  If you develop fever, chills, malaise, or change in mental status call your Health Care Provider or go to the Emergency Department.  Nutrition is important, eat small frequent meals to help ensure you get adequate calories.  Do not stay in bed all day!  Increase your activity daily as tolerated.    PEG tube malfunction: PEG tube malfunction            RECS:  on increasing 02 requirements  pending official report of todays xray, by my read, slight increase in LLL infiltrate although previous xray- patient rotated  already on abx. continue 02  continue with neb  resume chest PT  solucortef Q12 hrs X 2 days, and then taper to daily X 2 days (if BP allows)    will continue to follow       Please call with any questions.    Leigh Ann Resendez DO  [facility] Pulmonary/Sleep Medicine  [phone]

## 2019-07-17 NOTE — PROGRESS NOTE ADULT - SUBJECTIVE AND OBJECTIVE BOX
---___---___---___---___---___---___ ---___---___---___---___---___---___---___---___---                  M E D I C A L   A T T E N D I N G   P R O G R E S S   N O T E  ---___---___---___---___---___---___ ---___---___---___---___---___---___---___---___---        ================================================    ++CHIEF COMPLAINT:   Patient is a 69y old  Female who presents with a chief complaint of PEG tube dislodgement, fever, leukocytosis (2019 10:49)    swelling and edema still present     ---___---___---___---___---___---  PAST MEDICAL / Surgical  HISTORY:  PAST MEDICAL & SURGICAL HISTORY:  Type 2 diabetes mellitus  Dementia  DVT, lower extremity  CVA (cerebral vascular accident)  Fatty pancreas  PNA (pneumonia)  Pulmonary HTN  IGT (impaired glucose tolerance)  Ulcerative colitis  Acid reflux  Anxiety  Depression  Mouth sores  HLD (hyperlipidemia)  Asthma  S/P percutaneous endoscopic gastrostomy (PEG) tube placement: for dysphagia  Humeral head fracture  H/O: hysterectomy  H/O cataract extraction, left  History of knee replacement      ---___---___---___---___---___---  FAMILY HISTORY:   FAMILY HISTORY:  Family history of dementia (Grandparent)  Family history of colon cancer (Grandparent)        ---___---___---___---___---___---  ALLERGIES:   Allergies    ASA; dye contrast (Anaphylaxis)  aspirin (Short breath)  divalproex sodium (Other (Unknown))  Flowers and Plants (Short breath)  Haldol (Other (Unknown))  penicillin (Short breath; Rash)  sulfa drugs (Short breath; Rash)  vancomycin (Rash; Urticaria; Hives)  Xanax (Other (Unknown))    Intolerances        ---___---___---___---___---___---  MEDICATIONS:  MEDICATIONS  (STANDING):  artificial tears (preservative free) Ophthalmic Solution 1 Drop(s) Both EYES four times a day  ascorbic acid 500 milliGRAM(s) Oral daily  BACItracin   Ointment 1 Application(s) Topical two times a day  clonazePAM  Tablet 1 milliGRAM(s) Oral at bedtime  Dakins Solution - 1/4 Strength 1 Application(s) Topical two times a day  dextrose 5%. 1000 milliLiter(s) (50 mL/Hr) IV Continuous <Continuous>  famotidine    Tablet 20 milliGRAM(s) Oral daily  ferrous    sulfate Liquid 300 milliGRAM(s) Enteral Tube daily  formoterol for nebulization 20 MICROGram(s) Nebulizer two times a day  gabapentin   Solution 300 milliGRAM(s) Oral every 12 hours  heparin  Infusion.  Unit(s)/Hr (10 mL/Hr) IV Continuous <Continuous>  hydrocortisone sodium succinate Injectable 25 milliGRAM(s) IV Push every 8 hours  insulin lispro (HumaLOG) corrective regimen sliding scale   SubCutaneous every 6 hours  levothyroxine Injectable 60 MICROGram(s) IV Push at bedtime  Medical Marijuana Awake Oil 2 milliLiter(s),Medical Marijuana Awake Oil 2 milliLiter(s) 2 milliLiter(s) Enteral Tube <User Schedule>  Medical Marijuana Calm Oil 2 milliLiter(s),Medical [place]juana Calm Oil 2 milliLiter(s) 2 milliLiter(s) Enteral Tube <User Schedule>  Medical Marijuana [place] Oil 2 milliLiter(s),Medical Marijuana [place] Oil 2 milliLiter(s) 2 milliLiter(s) Enteral Tube <User Schedule>  meropenem  IVPB 500 milliGRAM(s) IV Intermittent every 12 hours  multivitamin/minerals/iron Oral Solution (CENTRUM) 15 milliLiter(s) Oral daily  QUEtiapine 25 milliGRAM(s) Oral at bedtime  sodium chloride 0.9%. 1000 milliLiter(s) (75 mL/Hr) IV Continuous <Continuous>  tiZANidine 4 milliGRAM(s) Oral <User Schedule>  zinc sulfate 220 milliGRAM(s) Oral daily    MEDICATIONS  (PRN):  acetaminophen  Suppository .. 650 milliGRAM(s) Rectal every 6 hours PRN Temp greater or equal to 38C (100.4F), Mild Pain (1 - 3)  carboxymethylcellulose 0.5% (Preservative-Free) Ophthalmic Solution 1 Drop(s) Both EYES three times a day PRN dry eyes  glucagon  Injectable 1 milliGRAM(s) IntraMuscular once PRN Glucose LESS THAN 70 milligrams/deciliter  heparin  Injectable 4000 Unit(s) IV Push every 6 hours PRN For aPTT less than 40  heparin  Injectable 2000 Unit(s) IV Push every 6 hours PRN For aPTT between 40 - 57  nystatin Powder 1 Application(s) Topical three times a day PRN under breasts      ---___---___---___---___---___---  REVIEW OF SYSTEM:    unable to obtain     ---___---___---___---___---___---  VITAL SIGNS:  69y , CAPILLARY BLOOD GLUCOSE      POCT Blood Glucose.: 180 mg/dL (2019 04:45)    T(C): 36.4 (19 @ 05:00), Max: 37 (19 @ 22:28)  HR: 111 (19 @ 05:00) (100 - 122)  BP: 144/89 (19 @ 05:00) (119/71 - 144/89)  RR: 18 (19 @ 05:00) (18 - 18)  SpO2: 99% (19 @ 05:00) (93% - 100%)  ---___---___---___---___---___---  PHYSICAL EXAM:    GEN: A&O X 0 , NAD , comfortable  HEENT: NCAT, PERRL, MMM, hearing intact  Neck: supple , no JVD  CVS: S1S2 , regular , No M/R/G appreciated  PULM:: decreased breath sounds   ABD.: soft. non tender, non distended,  bowel sounds present peg noted   Extrem: intact pulses , edema noted globally   Derm:  multiple decubitus noted         ---___---___---___---___---___---            LAB AND IMAGIN.1    17.76 )-----------( 323      ( 2019 08:57 )             25.4                     PTT - ( 2019 00:14 )  PTT:31.7 sec                            [All pertinent / recent Imaging reviewed]         ---___---___---___---___---___---___ ---___---___---___---___---                         A S S E S S M E N T   A N D   P L A N :      HEALTH ISSUES - PROBLEM Dx:  libia monitor creatinine   edema was given lasix follow labs   cxr ordered   chf restart toprol is necessary  chronic pain on medical marijuana and neurontini  dm2 from chronic steroids use on insulin   history of asthma  taper prednisone to 7.5 mg via peg  now on hydrocortisone   agitation on klonopin and seroquel   -GI/DVT Prophylaxis. restart eliquis    --------------------------------------------  Case discussed with   Education given on   ___________________________  Thank you,  Deniz Bolden  [phone]

## 2019-07-17 NOTE — PROGRESS NOTE ADULT - SUBJECTIVE AND OBJECTIVE BOX
NEPHROLOGY-[facility] [phone]        Patient seen and examined in bed.  She was about the same         MEDICATIONS  (STANDING):  apixaban 2.5 milliGRAM(s) Oral two times a day  artificial tears (preservative free) Ophthalmic Solution 1 Drop(s) Both EYES four times a day  ascorbic acid 500 milliGRAM(s) Oral daily  BACItracin   Ointment 1 Application(s) Topical two times a day  clonazePAM  Tablet 1 milliGRAM(s) Oral at bedtime  Dakins Solution - 1/4 Strength 1 Application(s) Topical two times a day  dextrose 5%. 1000 milliLiter(s) (50 mL/Hr) IV Continuous <Continuous>  famotidine    Tablet 20 milliGRAM(s) Oral daily  ferrous    sulfate Liquid 300 milliGRAM(s) Enteral Tube daily  formoterol for nebulization 20 MICROGram(s) Nebulizer two times a day  furosemide   IVPB 120 milliGRAM(s) IV Intermittent once  gabapentin   Solution 300 milliGRAM(s) Oral every 12 hours  heparin  Infusion.  Unit(s)/Hr (10 mL/Hr) IV Continuous <Continuous>  hydrocortisone sodium succinate Injectable 25 milliGRAM(s) IV Push every 12 hours  insulin lispro (HumaLOG) corrective regimen sliding scale   SubCutaneous every 6 hours  levothyroxine Injectable 60 MICROGram(s) IV Push at bedtime  Medical Marijuana Awake Oil 2 milliLiter(s),Medical Marijuana Awake Oil 2 milliLiter(s) 2 milliLiter(s) Enteral Tube <User Schedule>  Medical Marijuana Calm Oil 2 milliLiter(s),Medical [place]ju[place] Calm Oil 2 milliLiter(s) 2 milliLiter(s) Enteral Tube <User Schedule>  Medical Marijuana [place] Oil 2 milliLiter(s),Medical Marijuana [place] Oil 2 milliLiter(s) 2 milliLiter(s) Enteral Tube <User Schedule>  meropenem  IVPB 500 milliGRAM(s) IV Intermittent every 12 hours  multivitamin/minerals/iron Oral Solution (CENTRUM) 15 milliLiter(s) Oral daily  QUEtiapine 25 milliGRAM(s) Oral at bedtime  sodium chloride 0.9%. 1000 milliLiter(s) (75 mL/Hr) IV Continuous <Continuous>  tiZANidine 4 milliGRAM(s) Oral <User Schedule>  zinc sulfate 220 milliGRAM(s) Oral daily      VITAL:  T(C): , Max: 37 (07-16-19 @ 22:28)  T(F): , Max: 98.6 (07-16-19 @ 22:28)  HR: 122 (07-17-19 @ 09:00)  BP: 133/88 (07-17-19 @ 09:00)  BP(mean): --  RR: 18 (07-17-19 @ 09:00)  SpO2: 98% (07-17-19 @ 09:00)  Wt(kg): --    I and O's:    07-16 @ 07:01  -  07-17 @ 07:00  --------------------------------------------------------  IN: 494 mL / OUT: 250 mL / NET: 244 mL          PHYSICAL EXAM:    Constitutional: NAD; cachetic  Neck:  +  JVD  Respiratory: poor effort   Cardiovascular: S1 and S2  Gastrointestinal: BS+, soft, NT/ND  Extremities: +  peripheral edema  Neurological: unable   : +  Blackman  Skin: No rashes  Access: Not applicable    LABS:                        8.3    22.38 )-----------( 395      ( 17 Jul 2019 09:27 )             26.6     07-17    131<L>  |  93<L>  |  108<H>  ----------------------------<  154<H>  4.1   |  16<L>  |  3.01<H>    Ca    9.4      17 Jul 2019 07:07            Urine Studies:          RADIOLOGY & ADDITIONAL STUDIES:

## 2019-07-17 NOTE — CHART NOTE - NSCHARTNOTEFT_GEN_A_CORE
PA Medicine Event Note    Notified from sign out to follow up urine outpt s/p Lasix 120 mg given ~12 pm.  Spoke to RN around 5 PM- about 75 cc of urine outpt since Lasix was given.  Spoke to Dr. Ramos about above- stated to order 10mg/h Lasix infusion.  Ordered placed and attending made aware.    Rn also states patient has been having HR 120s-130 throughout day.   Spoke to Dr. Bolden- order lopressor 25 mg PO x1 through PEG and monitor after.    Stephanie Hu PA-C  Dept of Medicine  #[id number]

## 2019-07-18 LAB
ANION GAP SERPL CALC-SCNC: 22 MMOL/L — HIGH (ref 5–17)
APTT BLD: 33.2 SEC — SIGNIFICANT CHANGE UP (ref 27.5–36.3)
BLD GP AB SCN SERPL QL: NEGATIVE — SIGNIFICANT CHANGE UP
BUN SERPL-MCNC: 115 MG/DL — HIGH (ref 7–23)
CALCIUM SERPL-MCNC: 8.9 MG/DL — SIGNIFICANT CHANGE UP (ref 8.4–10.5)
CHLORIDE SERPL-SCNC: 93 MMOL/L — LOW (ref 96–108)
CO2 SERPL-SCNC: 19 MMOL/L — LOW (ref 22–31)
CREAT SERPL-MCNC: 3.26 MG/DL — HIGH (ref 0.5–1.3)
GLUCOSE BLDC GLUCOMTR-MCNC: 115 MG/DL — HIGH (ref 70–99)
GLUCOSE BLDC GLUCOMTR-MCNC: 144 MG/DL — HIGH (ref 70–99)
GLUCOSE BLDC GLUCOMTR-MCNC: 154 MG/DL — HIGH (ref 70–99)
GLUCOSE SERPL-MCNC: 154 MG/DL — HIGH (ref 70–99)
HCT VFR BLD CALC: 24.1 % — LOW (ref 34.5–45)
HCT VFR BLD CALC: 24.9 % — LOW (ref 34.5–45)
HGB BLD-MCNC: 7.5 G/DL — LOW (ref 11.5–15.5)
HGB BLD-MCNC: 8.5 G/DL — LOW (ref 11.5–15.5)
MCHC RBC-ENTMCNC: 28.7 PG — SIGNIFICANT CHANGE UP (ref 27–34)
MCHC RBC-ENTMCNC: 30.4 PG — SIGNIFICANT CHANGE UP (ref 27–34)
MCHC RBC-ENTMCNC: 31.1 GM/DL — LOW (ref 32–36)
MCHC RBC-ENTMCNC: 34.1 GM/DL — SIGNIFICANT CHANGE UP (ref 32–36)
MCV RBC AUTO: 89.4 FL — SIGNIFICANT CHANGE UP (ref 80–100)
MCV RBC AUTO: 92.3 FL — SIGNIFICANT CHANGE UP (ref 80–100)
PLATELET # BLD AUTO: 311 K/UL — SIGNIFICANT CHANGE UP (ref 150–400)
PLATELET # BLD AUTO: 317 K/UL — SIGNIFICANT CHANGE UP (ref 150–400)
POTASSIUM SERPL-MCNC: 3.9 MMOL/L — SIGNIFICANT CHANGE UP (ref 3.5–5.3)
POTASSIUM SERPL-SCNC: 3.9 MMOL/L — SIGNIFICANT CHANGE UP (ref 3.5–5.3)
RBC # BLD: 2.61 M/UL — LOW (ref 3.8–5.2)
RBC # BLD: 2.79 M/UL — LOW (ref 3.8–5.2)
RBC # FLD: 14.2 % — SIGNIFICANT CHANGE UP (ref 10.3–14.5)
RBC # FLD: 14.7 % — HIGH (ref 10.3–14.5)
RH IG SCN BLD-IMP: NEGATIVE — SIGNIFICANT CHANGE UP
SODIUM SERPL-SCNC: 134 MMOL/L — LOW (ref 135–145)
WBC # BLD: 19.3 K/UL — HIGH (ref 3.8–10.5)
WBC # BLD: 23.2 K/UL — HIGH (ref 3.8–10.5)
WBC # FLD AUTO: 19.3 K/UL — HIGH (ref 3.8–10.5)
WBC # FLD AUTO: 23.2 K/UL — HIGH (ref 3.8–10.5)

## 2019-07-18 PROCEDURE — 99232 SBSQ HOSP IP/OBS MODERATE 35: CPT

## 2019-07-18 PROCEDURE — 93010 ELECTROCARDIOGRAM REPORT: CPT

## 2019-07-18 PROCEDURE — 99233 SBSQ HOSP IP/OBS HIGH 50: CPT

## 2019-07-18 RX ORDER — METOPROLOL TARTRATE 50 MG
25 TABLET ORAL ONCE
Refills: 0 | Status: COMPLETED | OUTPATIENT
Start: 2019-07-18 | End: 2019-07-18

## 2019-07-18 RX ORDER — MEROPENEM 1 G/30ML
500 INJECTION INTRAVENOUS EVERY 24 HOURS
Refills: 0 | Status: DISCONTINUED | OUTPATIENT
Start: 2019-07-18 | End: 2019-07-22

## 2019-07-18 RX ORDER — CHLORHEXIDINE GLUCONATE 213 G/1000ML
1 SOLUTION TOPICAL DAILY
Refills: 0 | Status: DISCONTINUED | OUTPATIENT
Start: 2019-07-18 | End: 2019-08-09

## 2019-07-18 RX ADMIN — Medication 25 MILLIGRAM(S): at 18:53

## 2019-07-18 RX ADMIN — Medication 15 MILLILITER(S): at 12:44

## 2019-07-18 RX ADMIN — MEROPENEM 100 MILLIGRAM(S): 1 INJECTION INTRAVENOUS at 05:40

## 2019-07-18 RX ADMIN — FAMOTIDINE 20 MILLIGRAM(S): 10 INJECTION INTRAVENOUS at 12:44

## 2019-07-18 RX ADMIN — Medication 20 MICROGRAM(S): at 18:54

## 2019-07-18 RX ADMIN — TIZANIDINE 4 MILLIGRAM(S): 4 TABLET ORAL at 21:07

## 2019-07-18 RX ADMIN — APIXABAN 2.5 MILLIGRAM(S): 2.5 TABLET, FILM COATED ORAL at 18:52

## 2019-07-18 RX ADMIN — Medication 1 APPLICATION(S): at 18:54

## 2019-07-18 RX ADMIN — GABAPENTIN 300 MILLIGRAM(S): 400 CAPSULE ORAL at 21:08

## 2019-07-18 RX ADMIN — Medication 1 APPLICATION(S): at 05:36

## 2019-07-18 RX ADMIN — Medication 500 MILLIGRAM(S): at 12:44

## 2019-07-18 RX ADMIN — Medication 300 MILLIGRAM(S): at 12:44

## 2019-07-18 RX ADMIN — Medication 20 MICROGRAM(S): at 05:37

## 2019-07-18 RX ADMIN — Medication 25 MILLIGRAM(S): at 06:48

## 2019-07-18 RX ADMIN — QUETIAPINE FUMARATE 25 MILLIGRAM(S): 200 TABLET, FILM COATED ORAL at 22:21

## 2019-07-18 RX ADMIN — Medication 1 DROP(S): at 18:54

## 2019-07-18 RX ADMIN — GABAPENTIN 300 MILLIGRAM(S): 400 CAPSULE ORAL at 12:44

## 2019-07-18 RX ADMIN — Medication 25 MILLIGRAM(S): at 05:38

## 2019-07-18 RX ADMIN — Medication 1 DROP(S): at 05:34

## 2019-07-18 RX ADMIN — Medication 1 MILLIGRAM(S): at 22:22

## 2019-07-18 RX ADMIN — CHLORHEXIDINE GLUCONATE 1 APPLICATION(S): 213 SOLUTION TOPICAL at 12:45

## 2019-07-18 RX ADMIN — Medication 1 DROP(S): at 12:44

## 2019-07-18 RX ADMIN — ZINC SULFATE TAB 220 MG (50 MG ZINC EQUIVALENT) 220 MILLIGRAM(S): 220 (50 ZN) TAB at 12:44

## 2019-07-18 RX ADMIN — APIXABAN 2.5 MILLIGRAM(S): 2.5 TABLET, FILM COATED ORAL at 05:33

## 2019-07-18 RX ADMIN — Medication 1: at 06:47

## 2019-07-18 RX ADMIN — Medication 25 MILLIGRAM(S): at 22:21

## 2019-07-18 RX ADMIN — TIZANIDINE 4 MILLIGRAM(S): 4 TABLET ORAL at 09:27

## 2019-07-18 RX ADMIN — Medication 60 MICROGRAM(S): at 22:22

## 2019-07-18 NOTE — PROGRESS NOTE ADULT - ASSESSMENT
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  Fever and leukocytosis now. Abnormal CT chest.    , patient found with hypoxia and AMS suspect from aspiration event.            aspiration/pna     all cultures negative and temps better    back on meropenem for hypotension     hopefully creat will come down off vanco   unresponsive today  reculture for signs  of infection     discussed with family    chest xray  with possible new site of aspiration   decrease meropenem in view of renal failur

## 2019-07-18 NOTE — CHART NOTE - NSCHARTNOTEFT_GEN_A_CORE
Notified by RN . Patient seen and examined at bedside w/  at bedside. No changes from baseline.     VS   T 98      /83  RR 18  O2 Sat 97%     EKG sinus tach  no changes from baseline   D/w Dr. Whitehead and ordered lopressor 25 x1   monitor VS   d/w      will continue to monitor   will endorse to day team     Bhargavi Soto PA-C   [id number]

## 2019-07-18 NOTE — CHART NOTE - NSCHARTNOTEFT_GEN_A_CORE
Nutrition Follow Up Note  Patient seen for: Nutrition follow up assessment. Chart reviewed and events noted. Pt with dementia, nonverbal, PEG, s/p MICU for RRT for hypotension requiring pressors support, transferred to [address] [place] [address] for continuation of care. Pt with TRANG with elevated creatinine level likely 2/2 high vanco level per nephrology note and volume overloaded (noted pt dosing wt was 38.6kg  now changed to 55.2kg on ). Pt enteral feed also switched from bolus to continuous. Observed pt receiving Nepro @ goal 20cc/hr x 24hrs.     Source: EMR    Diet : NPO with enteral feed    Patient reports: no acute GI distress noted, +BM today per chart     PO intake : n/a     Enteral /Parenteral Nutrition: Nepro @ 20cc/hr x 24hrs provides total volume 480cc (864kcal and 39g protein)      Daily Weight in k.2 () - pt volume overloaded per chart  % Weight Change    Pertinent Medications: MEDICATIONS  (STANDING):  apixaban 2.5 milliGRAM(s) Oral two times a day  artificial tears (preservative free) Ophthalmic Solution 1 Drop(s) Both EYES four times a day  ascorbic acid 500 milliGRAM(s) Oral daily  BACItracin   Ointment 1 Application(s) Topical two times a day  chlorhexidine 2% Cloths 1 Application(s) Topical daily  clonazePAM  Tablet 1 milliGRAM(s) Oral at bedtime  Dakins Solution - 1/4 Strength 1 Application(s) Topical two times a day  dextrose 5%. 1000 milliLiter(s) (50 mL/Hr) IV Continuous <Continuous>  famotidine    Tablet 20 milliGRAM(s) Oral daily  ferrous    sulfate Liquid 300 milliGRAM(s) Enteral Tube daily  formoterol for nebulization 20 MICROGram(s) Nebulizer two times a day  furosemide Infusion 10 mG/Hr (5 mL/Hr) IV Continuous <Continuous>  gabapentin   Solution 300 milliGRAM(s) Oral every 12 hours  hydrocortisone sodium succinate Injectable 25 milliGRAM(s) IV Push every 12 hours  insulin lispro (HumaLOG) corrective regimen sliding scale   SubCutaneous every 6 hours  levothyroxine Injectable 60 MICROGram(s) IV Push at bedtime  Medical Marijuana Awake Oil 2 milliLiter(s),Medical Marijuana Awake Oil 2 milliLiter(s) 2 milliLiter(s) Enteral Tube <User Schedule>  Medical Marijuana Calm Oil 2 milliLiter(s),Medical [place]juana Calm Oil 2 milliLiter(s) 2 milliLiter(s) Enteral Tube <User Schedule>  Medical Marijuana [place] Oil 2 milliLiter(s),Medical Marijuana [place] Oil 2 milliLiter(s) 2 milliLiter(s) Enteral Tube <User Schedule>  meropenem  IVPB 500 milliGRAM(s) IV Intermittent every 24 hours  multivitamin/minerals/iron Oral Solution (CENTRUM) 15 milliLiter(s) Oral daily  QUEtiapine 25 milliGRAM(s) Oral at bedtime  sodium chloride 0.9%. 1000 milliLiter(s) (75 mL/Hr) IV Continuous <Continuous>  tiZANidine 4 milliGRAM(s) Oral <User Schedule>  zinc sulfate 220 milliGRAM(s) Oral daily    MEDICATIONS  (PRN):  acetaminophen  Suppository .. 650 milliGRAM(s) Rectal every 6 hours PRN Temp greater or equal to 38C (100.4F), Mild Pain (1 - 3)  carboxymethylcellulose 0.5% (Preservative-Free) Ophthalmic Solution 1 Drop(s) Both EYES three times a day PRN dry eyes  glucagon  Injectable 1 milliGRAM(s) IntraMuscular once PRN Glucose LESS THAN 70 milligrams/deciliter  nystatin Powder 1 Application(s) Topical three times a day PRN under breasts     @ 06:28: Na 134<L>, <H>, Cr 3.26<H>, <H>, K+ 3.9, Phos --, Mg --, Alk Phos --, ALT/SGPT --, AST/SGOT --, HbA1c --    Finger Sticks:  POCT Blood Glucose.: 154 mg/dL ( @ 06:17)  POCT Blood Glucose.: 197 mg/dL ( @ 23:46)  POCT Blood Glucose.: 129 mg/dL ( @ 17:42)  POCT Blood Glucose.: 153 mg/dL ( @ 12:46)      Skin per nursing documentation: sacrum stage 4, lower lumbar spine/ left malleolus/left knee/right medial lower leg unstageable, left inner thigh/left heel/right buttock DTI, left inner malleolus stage 2, left lower buttock stage 3  Edema: +2 generalized edema    Estimated Needs:   [x ] no change since previous assessment  [ ] recalculated:     Previous Nutrition Diagnosis: mild malnutrition   Nutrition Diagnosis is: ongoing    New Nutrition Diagnosis: n/a     Interventions:     Recommend  1) Consider increasing enteral feed goal rate to Nepro 30cc/hr x 24hrs to provide 1296 Kcal, 58g protein (33 Kcal/Kg, 1.5g protein/Kg based on initial dosing wt 38.6 Kg-/24 as pt's current dosing reflects fluid overload). Defer fluid needs to team.  2) Recommend to add Iron 2 x day for multiple pressure injuries.  3) Continue with multivitamin, vitamin C and Zinc supplementations for pressure injuries.  4) RD remains available.     Monitoring and Evaluation:     Continue to monitor Nutritional intake, Tolerance to diet prescription, weights, labs, skin integrity    RD remains available upon request and will follow up per protocol Nutrition Follow Up Note  Patient seen for: Nutrition follow up assessment. Chart reviewed and events noted. Pt with dementia, nonverbal, PEG, s/p MICU for RRT for hypotension requiring pressors support, transferred to [address] [place] [address] for continuation of care. Pt with TRANG with elevated creatinine level likely 2/2 high vanco level (now on hold) per nephrology note and volume overloaded (noted pt dosing wt was 38.6kg  now changed to 55.2kg on ). Pt enteral feed also switched from bolus to continuous. Observed pt receiving Nepro @ goal 20cc/hr x 24hrs.     Source: EMR    Diet : NPO with enteral feed    Patient reports: no acute GI distress noted, +BM today per chart     PO intake : n/a     Enteral /Parenteral Nutrition: Nepro @ 20cc/hr x 24hrs provides total volume 480cc (864kcal and 39g protein)      Daily Weight in k.2 () - pt volume overloaded per chart  % Weight Change    Pertinent Medications: MEDICATIONS  (STANDING):  apixaban 2.5 milliGRAM(s) Oral two times a day  artificial tears (preservative free) Ophthalmic Solution 1 Drop(s) Both EYES four times a day  ascorbic acid 500 milliGRAM(s) Oral daily  BACItracin   Ointment 1 Application(s) Topical two times a day  chlorhexidine 2% Cloths 1 Application(s) Topical daily  clonazePAM  Tablet 1 milliGRAM(s) Oral at bedtime  Dakins Solution - 1/4 Strength 1 Application(s) Topical two times a day  dextrose 5%. 1000 milliLiter(s) (50 mL/Hr) IV Continuous <Continuous>  famotidine    Tablet 20 milliGRAM(s) Oral daily  ferrous    sulfate Liquid 300 milliGRAM(s) Enteral Tube daily  formoterol for nebulization 20 MICROGram(s) Nebulizer two times a day  furosemide Infusion 10 mG/Hr (5 mL/Hr) IV Continuous <Continuous>  gabapentin   Solution 300 milliGRAM(s) Oral every 12 hours  hydrocortisone sodium succinate Injectable 25 milliGRAM(s) IV Push every 12 hours  insulin lispro (HumaLOG) corrective regimen sliding scale   SubCutaneous every 6 hours  levothyroxine Injectable 60 MICROGram(s) IV Push at bedtime  Medical Marijuana Awake Oil 2 milliLiter(s),Medical Marijuana Awake Oil 2 milliLiter(s) 2 milliLiter(s) Enteral Tube <User Schedule>  Medical Marijuana Calm Oil 2 milliLiter(s),Medical [place]ju[place] Calm Oil 2 milliLiter(s) 2 milliLiter(s) Enteral Tube <User Schedule>  Medical Marijuana [place] Oil 2 milliLiter(s),Medical Marijuana [place] Oil 2 milliLiter(s) 2 milliLiter(s) Enteral Tube <User Schedule>  meropenem  IVPB 500 milliGRAM(s) IV Intermittent every 24 hours  multivitamin/minerals/iron Oral Solution (CENTRUM) 15 milliLiter(s) Oral daily  QUEtiapine 25 milliGRAM(s) Oral at bedtime  sodium chloride 0.9%. 1000 milliLiter(s) (75 mL/Hr) IV Continuous <Continuous>  tiZANidine 4 milliGRAM(s) Oral <User Schedule>  zinc sulfate 220 milliGRAM(s) Oral daily    MEDICATIONS  (PRN):  acetaminophen  Suppository .. 650 milliGRAM(s) Rectal every 6 hours PRN Temp greater or equal to 38C (100.4F), Mild Pain (1 - 3)  carboxymethylcellulose 0.5% (Preservative-Free) Ophthalmic Solution 1 Drop(s) Both EYES three times a day PRN dry eyes  glucagon  Injectable 1 milliGRAM(s) IntraMuscular once PRN Glucose LESS THAN 70 milligrams/deciliter  nystatin Powder 1 Application(s) Topical three times a day PRN under breasts     @ 06:28: Na 134<L>, <H>, Cr 3.26<H>, <H>, K+ 3.9, Phos --, Mg --, Alk Phos --, ALT/SGPT --, AST/SGOT --, HbA1c --    Finger Sticks:  POCT Blood Glucose.: 154 mg/dL ( @ 06:17)  POCT Blood Glucose.: 197 mg/dL ( @ 23:46)  POCT Blood Glucose.: 129 mg/dL ( @ 17:42)  POCT Blood Glucose.: 153 mg/dL ( @ 12:46)      Skin per nursing documentation: sacrum stage 4, lower lumbar spine/ left malleolus/left knee/right medial lower leg unstageable, left inner thigh/left heel/right buttock DTI, left inner malleolus stage 2, left lower buttock stage 3  Edema: +2 generalized edema    Estimated Needs:   [x ] no change since previous assessment  [ ] recalculated:     Previous Nutrition Diagnosis: mild malnutrition   Nutrition Diagnosis is: ongoing    New Nutrition Diagnosis: n/a     Interventions:     Recommend  1) Consider increasing enteral feed goal rate to Nepro 30cc/hr x 24hrs to provide 1296 Kcal, 58g protein (33 Kcal/Kg, 1.5g protein/Kg based on initial dosing wt 38.6 Kg- as pt's current dosing reflects fluid overload). Defer fluid needs to team.  2) Recommend to add Iron 2 x day for multiple pressure injuries.  3) Continue with multivitamin, vitamin C and Zinc supplementations for pressure injuries.  4) RD remains available.     Monitoring and Evaluation:     Continue to monitor Nutritional intake, Tolerance to diet prescription, weights, labs, skin integrity    RD remains available upon request and will follow up per protocol

## 2019-07-18 NOTE — PROGRESS NOTE ADULT - SUBJECTIVE AND OBJECTIVE BOX
Patient is a 69y old  Female who presented with a chief complaint of PEG tube dislodgement, fever, leukocytosis (18 Jul 2019 10:56)      INTERVAL HPI/OVERNIGHT EVENTS:  no acute GI events per report    MEDICATIONS  (STANDING):  apixaban 2.5 milliGRAM(s) Oral two times a day  artificial tears (preservative free) Ophthalmic Solution 1 Drop(s) Both EYES four times a day  ascorbic acid 500 milliGRAM(s) Oral daily  BACItracin   Ointment 1 Application(s) Topical two times a day  chlorhexidine 2% Cloths 1 Application(s) Topical daily  clonazePAM  Tablet 1 milliGRAM(s) Oral at bedtime  Dakins Solution - 1/4 Strength 1 Application(s) Topical two times a day  dextrose 5%. 1000 milliLiter(s) (50 mL/Hr) IV Continuous <Continuous>  famotidine    Tablet 20 milliGRAM(s) Oral daily  ferrous    sulfate Liquid 300 milliGRAM(s) Enteral Tube daily  formoterol for nebulization 20 MICROGram(s) Nebulizer two times a day  furosemide Infusion 10 mG/Hr (5 mL/Hr) IV Continuous <Continuous>  gabapentin   Solution 300 milliGRAM(s) Oral every 12 hours  hydrocortisone sodium succinate Injectable 25 milliGRAM(s) IV Push every 12 hours  insulin lispro (HumaLOG) corrective regimen sliding scale   SubCutaneous every 6 hours  levothyroxine Injectable 60 MICROGram(s) IV Push at bedtime  Medical Marijuana Awake Oil 2 milliLiter(s),Medical Marijuana Awake Oil 2 milliLiter(s) 2 milliLiter(s) Enteral Tube <User Schedule>  Medical Marijuana Calm Oil 2 milliLiter(s),Medical [place]juana Calm Oil 2 milliLiter(s) 2 milliLiter(s) Enteral Tube <User Schedule>  Medical Marijuana [place] Oil 2 milliLiter(s),Medical Marijuana [place] Oil 2 milliLiter(s) 2 milliLiter(s) Enteral Tube <User Schedule>  meropenem  IVPB 500 milliGRAM(s) IV Intermittent every 24 hours  multivitamin/minerals/iron Oral Solution (CENTRUM) 15 milliLiter(s) Oral daily  QUEtiapine 25 milliGRAM(s) Oral at bedtime  sodium chloride 0.9%. 1000 milliLiter(s) (75 mL/Hr) IV Continuous <Continuous>  tiZANidine 4 milliGRAM(s) Oral <User Schedule>  zinc sulfate 220 milliGRAM(s) Oral daily    MEDICATIONS  (PRN):  acetaminophen  Suppository .. 650 milliGRAM(s) Rectal every 6 hours PRN Temp greater or equal to 38C (100.4F), Mild Pain (1 - 3)  carboxymethylcellulose 0.5% (Preservative-Free) Ophthalmic Solution 1 Drop(s) Both EYES three times a day PRN dry eyes  glucagon  Injectable 1 milliGRAM(s) IntraMuscular once PRN Glucose LESS THAN 70 milligrams/deciliter  nystatin Powder 1 Application(s) Topical three times a day PRN under breasts      Allergies  ASA; dye contrast (Anaphylaxis)  aspirin (Short breath)  divalproex sodium (Other (Unknown))  Flowers and Plants (Short breath)  Haldol (Other (Unknown))  penicillin (Short breath; Rash)  sulfa drugs (Short breath; Rash)  vancomycin (Rash; Urticaria; Hives)  Xanax (Other (Unknown))      Review of Systems:  unable to obtain    Vital Signs Last 24 Hrs  T(C): 36.9 (18 Jul 2019 13:00), Max: 36.9 (17 Jul 2019 23:00)  T(F): 98.5 (18 Jul 2019 13:00), Max: 98.5 (17 Jul 2019 23:00)  HR: 112 (18 Jul 2019 13:00) (111 - 132)  BP: 123/77 (18 Jul 2019 13:00) (99/65 - 141/95)  BP(mean): --  RR: 18 (18 Jul 2019 13:00) (18 - 18)  SpO2: 97% (18 Jul 2019 13:00) (97% - 100%)    PHYSICAL EXAM:  Constitutional: frail elderly chronically ill appearing +severe contractures, non verbal  opens eyes. spouseat bedside +anasarca  Neck: No JVD  Respiratory: + rhonchi , decreased BS bases  Cardiovascular: S1 and S2 tachy  Gastrointestinal: BS+, soft, +G tube  high in epigastric area  bumper noted to be at 2-3 cm richard. lightly touching skin and spins freely 360 degrees.    Extremities: anasarca/+edema +dressings in place  Vascular: 2+ peripheral pulses  Neurological: lethargic, no focal asymmetry  Psychiatric: non verbal   Skin: anicteric  +skin dressings and heel pad cushions in place  multiple decubiti (wound care following)    LABS:                        8.5    23.2  )-----------( 311      ( 18 Jul 2019 12:32 )             24.9     07-18    134<L>  |  93<L>  |  115<H>  ----------------------------<  154<H>  3.9   |  19<L>  |  3.26<H>    Ca    8.9      18 Jul 2019 06:28      PTT - ( 18 Jul 2019 06:29 )  PTT:33.2 sec    LIVER FUNCTIONS - ( 15 Jul 2019 00:14 )  Alb: 2.0 g/dL / Pro: 5.1 g/dL / ALK PHOS: 90 U/L / ALT: 11 U/L / AST: 12 U/L / GGT: x             RADIOLOGY & ADDITIONAL TESTS:

## 2019-07-18 NOTE — PROGRESS NOTE ADULT - SUBJECTIVE AND OBJECTIVE BOX
[facility] [phone]        Patient seen and examined in bed.  She started to urinate more this am         MEDICATIONS  (STANDING):  apixaban 2.5 milliGRAM(s) Oral two times a day  artificial tears (preservative free) Ophthalmic Solution 1 Drop(s) Both EYES four times a day  ascorbic acid 500 milliGRAM(s) Oral daily  BACItracin   Ointment 1 Application(s) Topical two times a day  chlorhexidine 2% Cloths 1 Application(s) Topical daily  clonazePAM  Tablet 1 milliGRAM(s) Oral at bedtime  Dakins Solution - 1/4 Strength 1 Application(s) Topical two times a day  dextrose 5%. 1000 milliLiter(s) (50 mL/Hr) IV Continuous <Continuous>  famotidine    Tablet 20 milliGRAM(s) Oral daily  ferrous    sulfate Liquid 300 milliGRAM(s) Enteral Tube daily  formoterol for nebulization 20 MICROGram(s) Nebulizer two times a day  furosemide Infusion 10 mG/Hr (5 mL/Hr) IV Continuous <Continuous>  gabapentin   Solution 300 milliGRAM(s) Oral every 12 hours  hydrocortisone sodium succinate Injectable 25 milliGRAM(s) IV Push every 12 hours  insulin lispro (HumaLOG) corrective regimen sliding scale   SubCutaneous every 6 hours  levothyroxine Injectable 60 MICROGram(s) IV Push at bedtime  Medical Marijuana Awake Oil 2 milliLiter(s),Medical Marijuana Awake Oil 2 milliLiter(s) 2 milliLiter(s) Enteral Tube <User Schedule>  Medical Marijuana Calm Oil 2 milliLiter(s),Medical [place]juana Calm Oil 2 milliLiter(s) 2 milliLiter(s) Enteral Tube <User Schedule>  Medical Marijuana [place] Oil 2 milliLiter(s),Medical Marijuana [place] Oil 2 milliLiter(s) 2 milliLiter(s) Enteral Tube <User Schedule>  meropenem  IVPB 500 milliGRAM(s) IV Intermittent every 24 hours  multivitamin/minerals/iron Oral Solution (CENTRUM) 15 milliLiter(s) Oral daily  QUEtiapine 25 milliGRAM(s) Oral at bedtime  sodium chloride 0.9%. 1000 milliLiter(s) (75 mL/Hr) IV Continuous <Continuous>  tiZANidine 4 milliGRAM(s) Oral <User Schedule>  zinc sulfate 220 milliGRAM(s) Oral daily      VITAL:  T(C): , Max: 36.9 (07-17-19 @ 23:00)  T(F): , Max: 98.5 (07-17-19 @ 23:00)  HR: 112 (07-18-19 @ 13:00)  BP: 123/77 (07-18-19 @ 13:00)  BP(mean): --  RR: 18 (07-18-19 @ 13:00)  SpO2: 97% (07-18-19 @ 13:00)  Wt(kg): --    I and O's:    07-17 @ 07:01  -  07-18 @ 07:00  --------------------------------------------------------  IN: 535 mL / OUT: 605 mL / NET: -70 mL    07-18 @ 07:01  -  07-18 @ 15:29  --------------------------------------------------------  IN: 200 mL / OUT: 325 mL / NET: -125 mL          PHYSICAL EXAM:    Constitutional: NAD; cachteic   Neck:  +  JVD  Respiratory: poor effort   Cardiovascular: S1 and S2  Gastrointestinal: BS+, soft, NT/ND  Extremities: +  peripheral edema  Neurological: A/O x 3, no focal deficits  Psychiatric: Normal mood, normal affect  : + Blackman  Skin: skin breakdown   Access: Not applicable    LABS:                        8.5    23.2  )-----------( 311      ( 18 Jul 2019 12:32 )             24.9     07-18    134<L>  |  93<L>  |  115<H>  ----------------------------<  154<H>  3.9   |  19<L>  |  3.26<H>    Ca    8.9      18 Jul 2019 06:28            Urine Studies:          RADIOLOGY & ADDITIONAL STUDIES:

## 2019-07-18 NOTE — PROGRESS NOTE ADULT - SUBJECTIVE AND OBJECTIVE BOX
infectious diseases progress note:    Patient is a 69y old  Female who presents with a chief complaint of PEG tube dislodgement, fever, leukocytosis (17 Jul 2019 15:39)        Gastrostomy malfunction             Allergies    ASA; dye contrast (Anaphylaxis)  aspirin (Short breath)  divalproex sodium (Other (Unknown))  Flowers and Plants (Short breath)  Haldol (Other (Unknown))  penicillin (Short breath; Rash)  sulfa drugs (Short breath; Rash)  vancomycin (Rash; Urticaria; Hives)  Xanax (Other (Unknown))    Intolerances        ANTIBIOTICS/RELEVANT:  antimicrobials  meropenem  IVPB 500 milliGRAM(s) IV Intermittent every 12 hours    immunologic:    OTHER:  acetaminophen  Suppository .. 650 milliGRAM(s) Rectal every 6 hours PRN  apixaban 2.5 milliGRAM(s) Oral two times a day  artificial tears (preservative free) Ophthalmic Solution 1 Drop(s) Both EYES four times a day  ascorbic acid 500 milliGRAM(s) Oral daily  BACItracin   Ointment 1 Application(s) Topical two times a day  carboxymethylcellulose 0.5% (Preservative-Free) Ophthalmic Solution 1 Drop(s) Both EYES three times a day PRN  clonazePAM  Tablet 1 milliGRAM(s) Oral at bedtime  Dakins Solution - 1/4 Strength 1 Application(s) Topical two times a day  dextrose 5%. 1000 milliLiter(s) IV Continuous <Continuous>  famotidine    Tablet 20 milliGRAM(s) Oral daily  ferrous    sulfate Liquid 300 milliGRAM(s) Enteral Tube daily  formoterol for nebulization 20 MICROGram(s) Nebulizer two times a day  furosemide Infusion 10 mG/Hr IV Continuous <Continuous>  gabapentin   Solution 300 milliGRAM(s) Oral every 12 hours  glucagon  Injectable 1 milliGRAM(s) IntraMuscular once PRN  hydrocortisone sodium succinate Injectable 25 milliGRAM(s) IV Push every 12 hours  insulin lispro (HumaLOG) corrective regimen sliding scale   SubCutaneous every 6 hours  levothyroxine Injectable 60 MICROGram(s) IV Push at bedtime  Medical Marijuana Awake Oil 2 milliLiter(s),Medical Marijuana Awake Oil 2 milliLiter(s) 2 milliLiter(s) Enteral Tube <User Schedule>  Medical Marijuana Calm Oil 2 milliLiter(s),Medical [place]ju[place] Calm Oil 2 milliLiter(s) 2 milliLiter(s) Enteral Tube <User Schedule>  Medical Marijuana [place] Oil 2 milliLiter(s),Medical Marijuana [place] Oil 2 milliLiter(s) 2 milliLiter(s) Enteral Tube <User Schedule>  multivitamin/minerals/iron Oral Solution (CENTRUM) 15 milliLiter(s) Oral daily  nystatin Powder 1 Application(s) Topical three times a day PRN  QUEtiapine 25 milliGRAM(s) Oral at bedtime  sodium chloride 0.9%. 1000 milliLiter(s) IV Continuous <Continuous>  tiZANidine 4 milliGRAM(s) Oral <User Schedule>  zinc sulfate 220 milliGRAM(s) Oral daily      Objective:  Vital Signs Last 24 Hrs  T(C): 36.9 (18 Jul 2019 05:00), Max: 36.9 (17 Jul 2019 13:00)  T(F): 98.5 (18 Jul 2019 05:00), Max: 98.5 (17 Jul 2019 23:00)  HR: 132 (18 Jul 2019 05:00) (112 - 132)  BP: 139/90 (18 Jul 2019 05:00) (99/65 - 143/90)  BP(mean): --  RR: 18 (18 Jul 2019 05:00) (18 - 18)  SpO2: 99% (18 Jul 2019 05:00) (98% - 100%)    PHYSICAL EXAM:     Eyes:JACQUELIN, EOMI  Ear/Nose/Throat: no oral lesion, no sinus tenderness on percussion	  Neck:no JVD, no lymphadenopathy, supple  Respiratory: CTA maryjane  Cardiovascular: S1S2 RRR, no murmurs  Gastrointestinal:soft, (+) BS, no HSM  Extremities:no e/e/c        LABS:                        8.3    22.38 )-----------( 395      ( 17 Jul 2019 09:27 )             26.6     07-18    134<L>  |  93<L>  |  115<H>  ----------------------------<  154<H>  3.9   |  19<L>  |  3.26<H>    Ca    8.9      18 Jul 2019 06:28      PTT - ( 18 Jul 2019 06:29 )  PTT:33.2 sec        MICROBIOLOGY:    RECENT CULTURES:  07-13 @ 10:20 .Urine                >=3 organisms. Probable collection contamination.    07-12 @ 18:59 .Blood                No growth at 5 days.    07-12 @ 16:59 .Blood                No growth at 5 days.          RESPIRATORY CULTURES:              RADIOLOGY & ADDITIONAL STUDIES:        Pager [phone]  After 5 pm/weekends or if no response :[phone]

## 2019-07-18 NOTE — PROGRESS NOTE ADULT - SUBJECTIVE AND OBJECTIVE BOX
---___---___---___---___---___---___ ---___---___---___---___---___---___---___---___---                  M E D I C A L   A T T E N D I N G   P R O G R E S S   N O T E  ---___---___---___---___---___---___ ---___---___---___---___---___---___---___---___---        ================================================    ++CHIEF COMPLAINT:   Patient is a 69y old  Female who presents with a chief complaint of PEG tube dislodgement, fever, leukocytosis (2019 07:49)    renal function still climbing     ---___---___---___---___---___---  PAST MEDICAL / Surgical  HISTORY:  PAST MEDICAL & SURGICAL HISTORY:  Type 2 diabetes mellitus  Dementia  DVT, lower extremity  CVA (cerebral vascular accident)  Fatty pancreas  PNA (pneumonia)  Pulmonary HTN  IGT (impaired glucose tolerance)  Ulcerative colitis  Acid reflux  Anxiety  Depression  Mouth sores  HLD (hyperlipidemia)  Asthma  S/P percutaneous endoscopic gastrostomy (PEG) tube placement: for dysphagia  Humeral head fracture  H/O: hysterectomy  H/O cataract extraction, left  History of knee replacement      ---___---___---___---___---___---  FAMILY HISTORY:   FAMILY HISTORY:  Family history of dementia (Grandparent)  Family history of colon cancer (Grandparent)        ---___---___---___---___---___---  ALLERGIES:   Allergies    ASA; dye contrast (Anaphylaxis)  aspirin (Short breath)  divalproex sodium (Other (Unknown))  Flowers and Plants (Short breath)  Haldol (Other (Unknown))  penicillin (Short breath; Rash)  sulfa drugs (Short breath; Rash)  vancomycin (Rash; Urticaria; Hives)  Xanax (Other (Unknown))    Intolerances        ---___---___---___---___---___---  MEDICATIONS:  MEDICATIONS  (STANDING):  apixaban 2.5 milliGRAM(s) Oral two times a day  artificial tears (preservative free) Ophthalmic Solution 1 Drop(s) Both EYES four times a day  ascorbic acid 500 milliGRAM(s) Oral daily  BACItracin   Ointment 1 Application(s) Topical two times a day  chlorhexidine 2% Cloths 1 Application(s) Topical daily  clonazePAM  Tablet 1 milliGRAM(s) Oral at bedtime  Dakins Solution - 1/4 Strength 1 Application(s) Topical two times a day  dextrose 5%. 1000 milliLiter(s) (50 mL/Hr) IV Continuous <Continuous>  famotidine    Tablet 20 milliGRAM(s) Oral daily  ferrous    sulfate Liquid 300 milliGRAM(s) Enteral Tube daily  formoterol for nebulization 20 MICROGram(s) Nebulizer two times a day  furosemide Infusion 10 mG/Hr (5 mL/Hr) IV Continuous <Continuous>  gabapentin   Solution 300 milliGRAM(s) Oral every 12 hours  hydrocortisone sodium succinate Injectable 25 milliGRAM(s) IV Push every 12 hours  insulin lispro (HumaLOG) corrective regimen sliding scale   SubCutaneous every 6 hours  levothyroxine Injectable 60 MICROGram(s) IV Push at bedtime  Medical Marijuana Awake Oil 2 milliLiter(s),Medical Marijuana Awake Oil 2 milliLiter(s) 2 milliLiter(s) Enteral Tube <User Schedule>  Medical Marijuana Calm Oil 2 milliLiter(s),Medical [place]ju[place] Calm Oil 2 milliLiter(s) 2 milliLiter(s) Enteral Tube <User Schedule>  Medical Marijuana [place] Oil 2 milliLiter(s),Medical Marijuana [place] Oil 2 milliLiter(s) 2 milliLiter(s) Enteral Tube <User Schedule>  meropenem  IVPB 500 milliGRAM(s) IV Intermittent every 24 hours  multivitamin/minerals/iron Oral Solution (CENTRUM) 15 milliLiter(s) Oral daily  QUEtiapine 25 milliGRAM(s) Oral at bedtime  sodium chloride 0.9%. 1000 milliLiter(s) (75 mL/Hr) IV Continuous <Continuous>  tiZANidine 4 milliGRAM(s) Oral <User Schedule>  zinc sulfate 220 milliGRAM(s) Oral daily    MEDICATIONS  (PRN):  acetaminophen  Suppository .. 650 milliGRAM(s) Rectal every 6 hours PRN Temp greater or equal to 38C (100.4F), Mild Pain (1 - 3)  carboxymethylcellulose 0.5% (Preservative-Free) Ophthalmic Solution 1 Drop(s) Both EYES three times a day PRN dry eyes  glucagon  Injectable 1 milliGRAM(s) IntraMuscular once PRN Glucose LESS THAN 70 milligrams/deciliter  nystatin Powder 1 Application(s) Topical three times a day PRN under breasts      ---___---___---___---___---___---  REVIEW OF SYSTEM:  unable to obtain   ---___---___---___---___---___---  VITAL SIGNS:  69y , CAPILLARY BLOOD GLUCOSE      POCT Blood Glucose.: 154 mg/dL (2019 06:17)    T(C): 36.8 (19 @ 09:00), Max: 36.9 (19 @ 13:00)  HR: 111 (19 @ 09:00) (111 - 132)  BP: 127/83 (19 @ 09:00) (99/65 - 143/90)  RR: 18 (19 @ 09:00) (18 - 18)  SpO2: 99% (19 @ 09:00) (99% - 100%)  ---___---___---___---___---___---  PHYSICAL EXAM:    GEN: A&O X03 , NAD , comfortable  HEENT: NCAT, PERRL, MMM, hearing intact  Neck: supple , no JVD  CVS: S1S2 , regular , No M/R/G appreciated  PULM: CTA B/L,  no W/R/R appreciated  ABD.: soft. non tender, non distended,  bowel sounds present peg noted   Extrem: intact pulses , no edema   Derm:  sacral decubitus still present        ---___---___---___---___---___---            LAB AND IMAGIN.5    19.30 )-----------( 317      ( 2019 08:32 )             24.1               07-18    134<L>  |  93<L>  |  115<H>  ----------------------------<  154<H>  3.9   |  19<L>  |  3.26<H>    Ca    8.9      2019 06:28      PTT - ( 2019 06:29 )  PTT:33.2 sec                            [All pertinent / recent Imaging reviewed]         ---___---___---___---___---___---___ ---___---___---___---___---                         A S S E S S M E N T   A N D   P L A N :    libia still not improving   on lasix drip  continue to monitor     chronic pain on medical mariijuana and neurontin    tachycardia  on beta blocker     dysphagia continue nepro       on seroquel and klonopin for agitation       insulin for dm2     -GI/DVT Prophylaxis.  on eliquis for dvt prophylaxis     overall prognosis poor  --------------------------------------------  Case discussed with   Education given on   ___________________________  Thank you,  Deniz Bolden  [phone]

## 2019-07-18 NOTE — PROVIDER CONTACT NOTE (OTHER) - ACTION/TREATMENT ORDERED:
as per BRET Munoz no intervention at this time will reassess in the AM, cardiology is following. will continue to monitor

## 2019-07-18 NOTE — PROGRESS NOTE ADULT - ASSESSMENT
Patient is 69 year-old woman with advanced dementia and functional quadriplegia presents with fevers, leukocytosis in the setting of PEG dislodgement. Patient with acute kidney injury that is likely multifactorial.   No evidence of decompensated heart failure - respirations are unlabored and patient is breathing comfortably on room air.    Antibiotics per ID.  PEG management and feeds per GI/nutrition.    Appreciate nephrology input: currently on Lasix gtt.  Patient is hypervolemic, but is now starting to diurese - hopefully, this is the diuretic phase of ATN and renal function starts to improve.     Trend daily labs. Avoid nephrotoxic agents.

## 2019-07-18 NOTE — PROGRESS NOTE ADULT - ASSESSMENT
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 since admission, went to IR to have PEG replaced.  TRANG and hyperkalemia.    1. TRANG likely multifactorial with vancomycin clearly a contributing factor.  Creatinine still rising  2. Hyponatremia from TRANG  3. Hypercalcemia, stable   4. Anemia. Hgb improved.   5. Volume overloaded    RECOMMEND:  - Lasix gtt at present.  Long discussion with the  and he is aware that HD would only be offered as a short term solution and would not be for the long term.  Will evaluate for HD in am BUT she was urinating more  -  Continue TF with Nephro till the creatinine improves    - BMP daily  - Monitor I's and O's and daily weights

## 2019-07-18 NOTE — PROGRESS NOTE ADULT - ASSESSMENT
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 Since admission, went to IR to have PEG replaced (6/25/19)  +fever and RRT over weekend for suspected aspiration event, and with Renal Insufficiency    -Discussed option of GJ tube but explained high risk of clogging and cannot do bolus feeds and pt would still be at risk for aspiration from secretions as she is presently with G tube (spouse and family do not want to explore GJ conversion option)    s/p RRT on 7/13 for hypotension in the SBP 50-60s and hypoxia in the 80s, admitted to MICU for shock requiring IV pressor support. Now stable off pressors, on NC. On stress steroids dosing  DVT on AC    RECS:  -keep G tube bumper at ~2-3 cm richard on G tube.  -G tube feeds  -wound care  -Abx as per ID  -monitor BMs  -Renal following  -Continue Vadim Miller PA-C    [facility]  [phone]  After hours and weekend coverage [phone]

## 2019-07-19 LAB
ANION GAP SERPL CALC-SCNC: 21 MMOL/L — HIGH (ref 5–17)
BUN SERPL-MCNC: 123 MG/DL — HIGH (ref 7–23)
CALCIUM SERPL-MCNC: 8.6 MG/DL — SIGNIFICANT CHANGE UP (ref 8.4–10.5)
CHLORIDE SERPL-SCNC: 94 MMOL/L — LOW (ref 96–108)
CO2 SERPL-SCNC: 19 MMOL/L — LOW (ref 22–31)
CREAT SERPL-MCNC: 3.35 MG/DL — HIGH (ref 0.5–1.3)
GLUCOSE BLDC GLUCOMTR-MCNC: 136 MG/DL — HIGH (ref 70–99)
GLUCOSE BLDC GLUCOMTR-MCNC: 138 MG/DL — HIGH (ref 70–99)
GLUCOSE BLDC GLUCOMTR-MCNC: 146 MG/DL — HIGH (ref 70–99)
GLUCOSE BLDC GLUCOMTR-MCNC: 163 MG/DL — HIGH (ref 70–99)
GLUCOSE SERPL-MCNC: 138 MG/DL — HIGH (ref 70–99)
HCT VFR BLD CALC: 23.2 % — LOW (ref 34.5–45)
HGB BLD-MCNC: 7.1 G/DL — LOW (ref 11.5–15.5)
MCHC RBC-ENTMCNC: 28.3 PG — SIGNIFICANT CHANGE UP (ref 27–34)
MCHC RBC-ENTMCNC: 30.6 GM/DL — LOW (ref 32–36)
MCV RBC AUTO: 92.4 FL — SIGNIFICANT CHANGE UP (ref 80–100)
PLATELET # BLD AUTO: 324 K/UL — SIGNIFICANT CHANGE UP (ref 150–400)
POTASSIUM SERPL-MCNC: 3.8 MMOL/L — SIGNIFICANT CHANGE UP (ref 3.5–5.3)
POTASSIUM SERPL-SCNC: 3.8 MMOL/L — SIGNIFICANT CHANGE UP (ref 3.5–5.3)
RBC # BLD: 2.51 M/UL — LOW (ref 3.8–5.2)
RBC # FLD: 14.9 % — HIGH (ref 10.3–14.5)
SODIUM SERPL-SCNC: 134 MMOL/L — LOW (ref 135–145)
WBC # BLD: 21.78 K/UL — HIGH (ref 3.8–10.5)
WBC # FLD AUTO: 21.78 K/UL — HIGH (ref 3.8–10.5)

## 2019-07-19 PROCEDURE — 93010 ELECTROCARDIOGRAM REPORT: CPT

## 2019-07-19 PROCEDURE — 99232 SBSQ HOSP IP/OBS MODERATE 35: CPT

## 2019-07-19 PROCEDURE — 99233 SBSQ HOSP IP/OBS HIGH 50: CPT

## 2019-07-19 RX ORDER — METOPROLOL TARTRATE 50 MG
12.5 TABLET ORAL ONCE
Refills: 0 | Status: DISCONTINUED | OUTPATIENT
Start: 2019-07-19 | End: 2019-07-19

## 2019-07-19 RX ORDER — METOPROLOL TARTRATE 50 MG
12.5 TABLET ORAL ONCE
Refills: 0 | Status: COMPLETED | OUTPATIENT
Start: 2019-07-19 | End: 2019-07-19

## 2019-07-19 RX ORDER — METOPROLOL TARTRATE 50 MG
12.5 TABLET ORAL
Refills: 0 | Status: DISCONTINUED | OUTPATIENT
Start: 2019-07-19 | End: 2019-07-22

## 2019-07-19 RX ORDER — FUROSEMIDE 40 MG
5 TABLET ORAL
Qty: 500 | Refills: 0 | Status: DISCONTINUED | OUTPATIENT
Start: 2019-07-19 | End: 2019-07-22

## 2019-07-19 RX ORDER — METOPROLOL TARTRATE 50 MG
12.5 TABLET ORAL
Refills: 0 | Status: DISCONTINUED | OUTPATIENT
Start: 2019-07-19 | End: 2019-07-19

## 2019-07-19 RX ADMIN — CHLORHEXIDINE GLUCONATE 1 APPLICATION(S): 213 SOLUTION TOPICAL at 12:53

## 2019-07-19 RX ADMIN — Medication 60 MICROGRAM(S): at 22:28

## 2019-07-19 RX ADMIN — QUETIAPINE FUMARATE 25 MILLIGRAM(S): 200 TABLET, FILM COATED ORAL at 22:28

## 2019-07-19 RX ADMIN — TIZANIDINE 4 MILLIGRAM(S): 4 TABLET ORAL at 09:01

## 2019-07-19 RX ADMIN — TIZANIDINE 4 MILLIGRAM(S): 4 TABLET ORAL at 21:09

## 2019-07-19 RX ADMIN — Medication 25 MILLIGRAM(S): at 17:18

## 2019-07-19 RX ADMIN — Medication 1 MILLIGRAM(S): at 22:27

## 2019-07-19 RX ADMIN — Medication 15 MILLILITER(S): at 12:55

## 2019-07-19 RX ADMIN — Medication 1 APPLICATION(S): at 06:21

## 2019-07-19 RX ADMIN — Medication 300 MILLIGRAM(S): at 12:55

## 2019-07-19 RX ADMIN — Medication 2.5 MG/HR: at 11:29

## 2019-07-19 RX ADMIN — Medication 1 APPLICATION(S): at 17:21

## 2019-07-19 RX ADMIN — Medication 20 MICROGRAM(S): at 18:37

## 2019-07-19 RX ADMIN — Medication 1 DROP(S): at 12:56

## 2019-07-19 RX ADMIN — APIXABAN 2.5 MILLIGRAM(S): 2.5 TABLET, FILM COATED ORAL at 17:18

## 2019-07-19 RX ADMIN — Medication 1 APPLICATION(S): at 06:22

## 2019-07-19 RX ADMIN — Medication 20 MICROGRAM(S): at 06:22

## 2019-07-19 RX ADMIN — Medication 500 MILLIGRAM(S): at 12:55

## 2019-07-19 RX ADMIN — Medication 1 DROP(S): at 06:20

## 2019-07-19 RX ADMIN — Medication 12.5 MILLIGRAM(S): at 21:09

## 2019-07-19 RX ADMIN — Medication 1 DROP(S): at 17:20

## 2019-07-19 RX ADMIN — MEROPENEM 100 MILLIGRAM(S): 1 INJECTION INTRAVENOUS at 06:37

## 2019-07-19 RX ADMIN — GABAPENTIN 300 MILLIGRAM(S): 400 CAPSULE ORAL at 09:01

## 2019-07-19 RX ADMIN — APIXABAN 2.5 MILLIGRAM(S): 2.5 TABLET, FILM COATED ORAL at 06:20

## 2019-07-19 RX ADMIN — Medication 1 APPLICATION(S): at 17:18

## 2019-07-19 RX ADMIN — Medication 25 MILLIGRAM(S): at 06:23

## 2019-07-19 RX ADMIN — Medication 1: at 00:58

## 2019-07-19 RX ADMIN — ZINC SULFATE TAB 220 MG (50 MG ZINC EQUIVALENT) 220 MILLIGRAM(S): 220 (50 ZN) TAB at 12:55

## 2019-07-19 RX ADMIN — GABAPENTIN 300 MILLIGRAM(S): 400 CAPSULE ORAL at 21:10

## 2019-07-19 RX ADMIN — FAMOTIDINE 20 MILLIGRAM(S): 10 INJECTION INTRAVENOUS at 12:57

## 2019-07-19 NOTE — PROGRESS NOTE ADULT - ASSESSMENT
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 Since admission, went to IR to have PEG replaced (6/25/19)  +fever and RRT over weekend for suspected aspiration event, and with Renal Insufficiency    -Discussed option of GJ tube but explained high risk of clogging and cannot do bolus feeds and pt would still be at risk for aspiration from secretions as she is presently with G tube (spouse and family do not want to explore GJ conversion option)    s/p RRT on 7/13 for hypotension in the SBP 50-60s and hypoxia in the 80s, admitted to MICU for shock requiring IV pressor support. Now stable off pressors, on NC. On stress steroids dosing  DVT on AC    RECS:  -keep G tube bumper at ~2-3 cm richard on G tube.  -G tube feeds  -wound care  -Abx as per ID  -monitor BMs  -Renal following  -Continue Pepcid    Please call over weekend prn with GI concerns   GI service : [phone]    Adam Miller PA-C    [facility]  [phone]  After hours and weekend coverage [phone]

## 2019-07-19 NOTE — PROGRESS NOTE ADULT - ASSESSMENT
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 since admission, went to IR to have PEG replaced.  TRANG and hyperkalemia.    1. TRANG likely multifactorial with Creatinine still rising  2. Hyponatremia from TRANG  3. Hypercalcemia, stable   4. Anemia.   5. Volume overloaded    RECOMMEND:  - She made 250 cc in the last 4 hours.  Not sure if the ins/outs are recorded corrrectly.  Less JVD then before.  Reduce the Lasix gtt to 5mg/hr.     -  Continue TF with Nephro till the creatinine improves.  However reduce Nepro feed to 25cc/hr   - BMP daily     I have discussed with the  re HD and he would like to hold off for now.

## 2019-07-19 NOTE — PROVIDER CONTACT NOTE (OTHER) - ASSESSMENT
pt alox0. nonverbal indicators of pain not present verbal. vital signs as charted pt alox0. nonverbal  vital signs as charted

## 2019-07-19 NOTE — PROGRESS NOTE ADULT - ASSESSMENT
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  Fever and leukocytosis now. Abnormal CT chest.    , patient found with hypoxia and AMS suspect from aspiration event.            aspiration/pna     all cultures negative and temps better    back on meropenem for hypotension     hopefully creat will come down off vanco   unresponsive today  reculture for signs  of infection     discussed with family    chest xray  with possible new site of aspiration   decrease meropenem in view of renal failure  plan to complete meropenem  on monday

## 2019-07-19 NOTE — PROGRESS NOTE ADULT - SUBJECTIVE AND OBJECTIVE BOX
---___---___---___---___---___---___ ---___---___---___---___---___---___---___---___---                  M E D I C A L   A T T E N D I N G   P R O G R E S S   N O T E  ---___---___---___---___---___---___ ---___---___---___---___---___---___---___---___---        ================================================    ++CHIEF COMPLAINT:   Patient is a 69y old  Female who presents with a chief complaint of PEG tube dislodgement, fever, leukocytosis (2019 13:05)    creatinine still rising     ---___---___---___---___---___---  PAST MEDICAL / Surgical  HISTORY:  PAST MEDICAL & SURGICAL HISTORY:  Type 2 diabetes mellitus  Dementia  DVT, lower extremity  CVA (cerebral vascular accident)  Fatty pancreas  PNA (pneumonia)  Pulmonary HTN  IGT (impaired glucose tolerance)  Ulcerative colitis  Acid reflux  Anxiety  Depression  Mouth sores  HLD (hyperlipidemia)  Asthma  S/P percutaneous endoscopic gastrostomy (PEG) tube placement: for dysphagia  Humeral head fracture  H/O: hysterectomy  H/O cataract extraction, left  History of knee replacement      ---___---___---___---___---___---  FAMILY HISTORY:   FAMILY HISTORY:  Family history of dementia (Grandparent)  Family history of colon cancer (Grandparent)        ---___---___---___---___---___---  ALLERGIES:   Allergies    ASA; dye contrast (Anaphylaxis)  aspirin (Short breath)  divalproex sodium (Other (Unknown))  Flowers and Plants (Short breath)  Haldol (Other (Unknown))  penicillin (Short breath; Rash)  sulfa drugs (Short breath; Rash)  vancomycin (Rash; Urticaria; Hives)  Xanax (Other (Unknown))    Intolerances        ---___---___---___---___---___---  MEDICATIONS:  MEDICATIONS  (STANDING):  apixaban 2.5 milliGRAM(s) Oral two times a day  artificial tears (preservative free) Ophthalmic Solution 1 Drop(s) Both EYES four times a day  ascorbic acid 500 milliGRAM(s) Oral daily  BACItracin   Ointment 1 Application(s) Topical two times a day  chlorhexidine 2% Cloths 1 Application(s) Topical daily  clonazePAM  Tablet 1 milliGRAM(s) Oral at bedtime  Dakins Solution - 1/4 Strength 1 Application(s) Topical two times a day  dextrose 5%. 1000 milliLiter(s) (50 mL/Hr) IV Continuous <Continuous>  famotidine    Tablet 20 milliGRAM(s) Oral daily  ferrous    sulfate Liquid 300 milliGRAM(s) Enteral Tube daily  formoterol for nebulization 20 MICROGram(s) Nebulizer two times a day  furosemide Infusion 5 mG/Hr (2.5 mL/Hr) IV Continuous <Continuous>  gabapentin   Solution 300 milliGRAM(s) Oral every 12 hours  hydrocortisone sodium succinate Injectable 25 milliGRAM(s) IV Push every 12 hours  insulin lispro (HumaLOG) corrective regimen sliding scale   SubCutaneous every 6 hours  levothyroxine Injectable 60 MICROGram(s) IV Push at bedtime  Medical Marijuana Awake Oil 2 milliLiter(s),Medical Marijuana [place]ake Oil 2 milliLiter(s) 2 milliLiter(s) Enteral Tube <User Schedule>  Medical Marijuana Calm Oil 2 milliLiter(s),Medical [place] Calm Oil 2 milliLiter(s) 2 milliLiter(s) Enteral Tube <User Schedule>  Medical Marijuana [place] Oil 2 milliLiter(s),Medical Marijuana [place] Oil 2 milliLiter(s) 2 milliLiter(s) Enteral Tube <User Schedule>  meropenem  IVPB 500 milliGRAM(s) IV Intermittent every 24 hours  multivitamin/minerals/iron Oral Solution (CENTRUM) 15 milliLiter(s) Oral daily  QUEtiapine 25 milliGRAM(s) Oral at bedtime  sodium chloride 0.9%. 1000 milliLiter(s) (75 mL/Hr) IV Continuous <Continuous>  tiZANidine 4 milliGRAM(s) Oral <User Schedule>  zinc sulfate 220 milliGRAM(s) Oral daily    MEDICATIONS  (PRN):  acetaminophen  Suppository .. 650 milliGRAM(s) Rectal every 6 hours PRN Temp greater or equal to 38C (100.4F), Mild Pain (1 - 3)  carboxymethylcellulose 0.5% (Preservative-Free) Ophthalmic Solution 1 Drop(s) Both EYES three times a day PRN dry eyes  glucagon  Injectable 1 milliGRAM(s) IntraMuscular once PRN Glucose LESS THAN 70 milligrams/deciliter  nystatin Powder 1 Application(s) Topical three times a day PRN under breasts      ---___---___---___---___---___---  REVIEW OF SYSTEM:    unable to obtain     ---___---___---___---___---___---  VITAL SIGNS:  69y , CAPILLARY BLOOD GLUCOSE      POCT Blood Glucose.: 136 mg/dL (2019 12:33)    T(C): 36.7 (19 @ 14:00), Max: 37.1 (19 @ 17:00)  HR: 114 (19 @ 14:00) (96 - 141)  BP: 144/85 (19 @ 14:00) (118/79 - 146/86)  RR: 18 (19 @ 14:00) (16 - 18)  SpO2: 96% (19 @ 14:00) (96% - 100%)  ---___---___---___---___---___---  PHYSICAL EXAM:    GEN: A&O X 0 , NAD , comfortable  HEENT: NCAT, PERRL, MMM, hearing intact  Neck: supple , no JVD  CVS: S1S2 , regular , No M/R/G appreciated  PULM: CTA B/L,  no W/R/R appreciated  ABD.: soft. non tender, non distended,  bowel sounds present peg noted   Extrem: intact pulses , edema still present   Derm: stage four sacral decubitus noted         ---___---___---___---___---___---            LAB AND IMAGIN.1    21.78 )-----------( 324      ( 2019 08:30 )             23.2               07-19    134<L>  |  94<L>  |  123<H>  ----------------------------<  138<H>  3.8   |  19<L>  |  3.35<H>    Ca    8.6      2019 06:06      PTT - ( 2019 06:29 )  PTT:33.2 sec                            [All pertinent / recent Imaging reviewed]         ---___---___---___---___---___---___ ---___---___---___---___---                         A S S E S S M E N T   A N D   P L A N :      HEALTH ISSUES - PROBLEM Dx:  libia continue tapered lasix drip   feeds decreased   renal following     agitation on klonopin and seroquel       sacral decubitus  continue wound car e    pain,managment for chronic leg fracture  continue neurontin and medical marijuana       insulin for dm2    budesonide for asthma     continue  monitor urine output       monitor cbc and cmp h/    h/h drop sona from computer error     overall prognosis poor                  -GI/DVT Prophylaxis.    --------------------------------------------  Case discussed with   Education given on   ___________________________  Thank you,  Deniz Bolden  [phone]

## 2019-07-19 NOTE — CHART NOTE - NSCHARTNOTEFT_GEN_A_CORE
Notified by RN @ 21:12 for tachycardia with . Per RN, patient noted to have HR up to 200s when found to be hypoxic to 84% on 2L NC.  Patient seen and examined at bedside with presence of daughter. Patient is lying in bed on 4L in NAD. Patient is alert, but unable to obtain ROS due to mental status. Daughter is concerned about H&H drop and pt's deconditioning. All questions answered and reviewed the labs and plan of care with pt's daughter.    Vital Signs Last 24 Hrs  T(C): 37.2 (19 Jul 2019 21:05), Max: 37.2 (19 Jul 2019 18:56)  T(F): 98.9 (19 Jul 2019 21:05), Max: 99 (19 Jul 2019 18:56)  HR: 148 (19 Jul 2019 21:05) (96 - 148)  BP: 145/93 (19 Jul 2019 21:05) (118/79 - 146/86)  BP(mean): --  RR: 18 (19 Jul 2019 21:05) (16 - 18)  SpO2: 98% (19 Jul 2019 21:05) (96% - 100%)    07-19    134<L>  |  94<L>  |  123<H>  ----------------------------<  138<H>  3.8   |  19<L>  |  3.35<H>    Ca    8.6      19 Jul 2019 06:06                            7.1    21.78 )-----------( 324      ( 19 Jul 2019 08:30 )             23.2     Radiology :  < from: Xray Chest 1 View- PORTABLE-Routine (07.17.19 @ 08:55) >    IMPRESSION:   Right upper extremity PICC line is looped at the level of the mid humerus   and terminates at the region of the axillary vein.    Redemonstrated left pleural effusion.     New right upper lung opacity.    < end of copied text >    Physical Exam:  Constitutional: AAOx0. NAD, non-toxic appearance, contracted  Neuro: PERRLA, grossly intact, no focal deficits  Respiratory: clear lungs bilaterally, non-labored, no accessory muscle use   Cardiovascular: +JVD, S1 S2. tachycardic, No murmurs.  Gastrointestinal: soft, ND/NT, +BS in all quadrants, +PEG  Extremities/Vascular: +PP b/l LE, +BLE edema, warm to touch      ASSESSMENT/PLAN:   HPI:  Patient is 69 year-old woman with advanced dementia and functional quadriplegia presents with fevers, leukocytosis in the setting of PEG dislodgement. Patient with acute kidney injury that is likely multifactorial. Now with tachycardia likely 2/2 resp failure.    # Tachycardia        - 12 leads EKG       - Tele monitor with continuous pulse Ox       - CXR on 7/17 shows new RUL opacity with L pleural effusion       - Cards and        - c/w current antimicrobial regimen       - c/w IV hydration with vitals check Q4hrs         - ***** c/w supportive care       - Continue close monitoring of clinical status and vital signs. HD stable.        - will Endorse to primary team in AM      Rosamaria Stiles PA-C Notified by RN @ 21:12 for tachycardia with . Per RN, patient noted to have HR up to 200s when found to be hypoxic to 84% on 2L NC.  Patient seen and examined at bedside with presence of daughter. Patient is lying in bed on 4L in NAD. Patient is alert, but unable to obtain ROS due to mental status. Daughter is concerned about H&H drop and pt's deconditioning. All questions answered and reviewed the labs and plan of care with pt's daughter.    Vital Signs Last 24 Hrs  T(C): 37.2 (19 Jul 2019 21:05), Max: 37.2 (19 Jul 2019 18:56)  T(F): 98.9 (19 Jul 2019 21:05), Max: 99 (19 Jul 2019 18:56)  HR: 148 (19 Jul 2019 21:05) (96 - 148)  BP: 145/93 (19 Jul 2019 21:05) (118/79 - 146/86)  BP(mean): --  RR: 18 (19 Jul 2019 21:05) (16 - 18)  SpO2: 98% (19 Jul 2019 21:05) (96% - 100%)    07-19    134<L>  |  94<L>  |  123<H>  ----------------------------<  138<H>  3.8   |  19<L>  |  3.35<H>    Ca    8.6      19 Jul 2019 06:06                            7.1    21.78 )-----------( 324      ( 19 Jul 2019 08:30 )             23.2     Radiology :  < from: Xray Chest 1 View- PORTABLE-Routine (07.17.19 @ 08:55) >    IMPRESSION:   Right upper extremity PICC line is looped at the level of the mid humerus   and terminates at the region of the axillary vein.    Redemonstrated left pleural effusion.     New right upper lung opacity.    < end of copied text >    Physical Exam:  Constitutional: AAOx0. NAD, non-toxic appearance, contracted  Neuro: PERRLA, grossly intact, no focal deficits  Respiratory: clear lungs bilaterally, non-labored, no accessory muscle use   Cardiovascular: +JVD, S1 S2. tachycardic, No murmurs.  Gastrointestinal: soft, ND/NT, +BS in all quadrants, +PEG  Extremities/Vascular: +PP b/l LE, +BLE edema, warm to touch      ASSESSMENT/PLAN:   HPI:  Patient is 69 year-old woman with advanced dementia and functional quadriplegia presents with fevers, leukocytosis in the setting of PEG dislodgement. Patient with acute kidney injury that is likely multifactorial. Now with tachycardia likely 2/2 resp failure.    # Tachycardia likely 2/2 respiratory failure        - 12 leads EKG       - Tele monitor with continuous pulse Ox       - sat improved to 98%On 4L NC       - Standing order Lopressor given       - CXR on 7/17 shows new RUL opacity with L pleural effusion       - Cards and pulm on board, continue to follow recs       - Continue with Lasix gtt       - c/w current antimicrobial regimen       - c/w supportive care       - Continue close monitoring of clinical status and vital signs. HD stable       - will endorse to primary team in AM    Rosamaria Stiles PA-C Notified by RN @ 21:12 for tachycardia with . Per RN, patient noted to have HR up to 200s when found to be hypoxic to 84% on 2L NC.  Patient seen and examined at bedside with presence of daughter. Patient is lying in bed on 4L in NAD. Patient is alert, but unable to obtain ROS due to mental status. Daughter is concerned about H&H drop and pt's deconditioning. All questions answered and reviewed the labs and plan of care with pt's daughter.    Vital Signs Last 24 Hrs  T(C): 37.2 (19 Jul 2019 21:05), Max: 37.2 (19 Jul 2019 18:56)  T(F): 98.9 (19 Jul 2019 21:05), Max: 99 (19 Jul 2019 18:56)  HR: 148 (19 Jul 2019 21:05) (96 - 148)  BP: 145/93 (19 Jul 2019 21:05) (118/79 - 146/86)  BP(mean): --  RR: 18 (19 Jul 2019 21:05) (16 - 18)  SpO2: 98% (19 Jul 2019 21:05) (96% - 100%)    07-19    134<L>  |  94<L>  |  123<H>  ----------------------------<  138<H>  3.8   |  19<L>  |  3.35<H>    Ca    8.6      19 Jul 2019 06:06                            7.1    21.78 )-----------( 324      ( 19 Jul 2019 08:30 )             23.2     Radiology :  < from: Xray Chest 1 View- PORTABLE-Routine (07.17.19 @ 08:55) >    IMPRESSION:   Right upper extremity PICC line is looped at the level of the mid humerus   and terminates at the region of the axillary vein.    Redemonstrated left pleural effusion.     New right upper lung opacity.    < end of copied text >    Physical Exam:  Constitutional: AAOx0. NAD, non-toxic appearance, contracted  Neuro: PERRLA, grossly intact, no focal deficits  Respiratory: clear lungs bilaterally, non-labored, no accessory muscle use   Cardiovascular: +JVD, S1 S2. tachycardic, No murmurs.  Gastrointestinal: soft, ND/NT, +BS in all quadrants, +PEG  Extremities/Vascular: +PP b/l LE, +BLE edema, warm to touch      ASSESSMENT/PLAN:   HPI:  Patient is 69 year-old woman with advanced dementia and functional quadriplegia presents with fevers, leukocytosis in the setting of PEG dislodgement. Patient with acute kidney injury that is likely multifactorial. Now with tachycardia likely 2/2 resp failure.    # Tachycardia likely 2/2 respiratory failure        - 12 leads EKG       - Tele monitor with continuous pulse Ox       - sat improved to 98%On 4L NC       - Standing order Lopressor given       - CXR on 7/17 shows new RUL opacity with L pleural effusion       - Cards and pulm on board, continue to follow recs       - Continue with Lasix gtt       - c/w current antimicrobial regimen       - c/w supportive care       - Continue close monitoring of clinical status and vital signs. HD stable       - will endorse to primary team in AM    Addendum        - EKG shows NSR with  without acute T/ST changes from previous       - Finding discussed with pt's  at bedside       - Pt maintains NSR 100s on tele       - HD stable. Will continue to monitor closely    Rosamaria Stiles PA-C Notified by RN @ 21:12 for tachycardia with . Per RN, patient noted to have HR up to 200s when found to be hypoxic to 84% on 2L NC.  Patient seen and examined at bedside with presence of daughter. Patient is lying in bed on 4L in NAD. Patient is alert, but unable to obtain ROS due to mental status. Daughter is concerned about H&H drop and pt's deconditioning. All questions answered and reviewed the labs and plan of care with pt's daughter.    Vital Signs Last 24 Hrs  T(C): 37.2 (19 Jul 2019 21:05), Max: 37.2 (19 Jul 2019 18:56)  T(F): 98.9 (19 Jul 2019 21:05), Max: 99 (19 Jul 2019 18:56)  HR: 148 (19 Jul 2019 21:05) (96 - 148)  BP: 145/93 (19 Jul 2019 21:05) (118/79 - 146/86)  BP(mean): --  RR: 18 (19 Jul 2019 21:05) (16 - 18)  SpO2: 98% (19 Jul 2019 21:05) (96% - 100%)    07-19    134<L>  |  94<L>  |  123<H>  ----------------------------<  138<H>  3.8   |  19<L>  |  3.35<H>    Ca    8.6      19 Jul 2019 06:06                            7.1    21.78 )-----------( 324      ( 19 Jul 2019 08:30 )             23.2     Radiology :  < from: Xray Chest 1 View- PORTABLE-Routine (07.17.19 @ 08:55) >    IMPRESSION:   Right upper extremity PICC line is looped at the level of the mid humerus   and terminates at the region of the axillary vein.    Redemonstrated left pleural effusion.     New right upper lung opacity.    < end of copied text >    Physical Exam:  Constitutional: AAOx0. NAD, non-toxic appearance, contracted  Neuro: PERRLA, grossly intact, no focal deficits  Respiratory: clear lungs bilaterally, non-labored, no accessory muscle use   Cardiovascular: +JVD, S1 S2. tachycardic, No murmurs.  Gastrointestinal: soft, ND/NT, +BS in all quadrants, +PEG  Extremities/Vascular: +PP b/l LE, +BLE edema, warm to touch      ASSESSMENT/PLAN:   HPI:  Patient is 69 year-old woman with advanced dementia and functional quadriplegia presents with fevers, leukocytosis in the setting of PEG dislodgement. Patient with acute kidney injury that is likely multifactorial. Now with tachycardia likely 2/2 resp failure.    # Tachycardia likely 2/2 respiratory failure        - 12 leads EKG       - CBC and ABG       - Tele monitor with continuous pulse Ox       - sat improved to 98%On 4L NC       - Standing order Lopressor given       - CXR on 7/17 shows new RUL opacity with L pleural effusion       - Cards and pulm on board, continue to follow recs       - Continue with Lasix gtt       - c/w current antimicrobial regimen       - c/w supportive care       - Continue close monitoring of clinical status and vital signs. HD stable       - will endorse to primary team in AM    Addendum        - EKG shows NSR with  without acute T/ST changes from previous       - Finding discussed with pt's  at bedside       - Pt maintains NSR 100s on tele       - HD stable. Will continue to monitor closely    Rosamaria Stiles PA-C Notified by RN @ 21:12 for tachycardia with . Per RN, patient noted to have HR up to 200s when found to be hypoxic to 84% on 2L NC.  Patient seen and examined at bedside with presence of daughter. Patient is lying in bed on 4L in NAD. Patient is alert, but unable to obtain ROS due to mental status. Daughter is concerned about H&H drop and pt's deconditioning. All questions answered and reviewed the labs and plan of care with pt's daughter.    Vital Signs Last 24 Hrs  T(C): 37.2 (19 Jul 2019 21:05), Max: 37.2 (19 Jul 2019 18:56)  T(F): 98.9 (19 Jul 2019 21:05), Max: 99 (19 Jul 2019 18:56)  HR: 148 (19 Jul 2019 21:05) (96 - 148)  BP: 145/93 (19 Jul 2019 21:05) (118/79 - 146/86)  BP(mean): --  RR: 18 (19 Jul 2019 21:05) (16 - 18)  SpO2: 98% (19 Jul 2019 21:05) (96% - 100%)    07-19    134<L>  |  94<L>  |  123<H>  ----------------------------<  138<H>  3.8   |  19<L>  |  3.35<H>    Ca    8.6      19 Jul 2019 06:06                            7.1    21.78 )-----------( 324      ( 19 Jul 2019 08:30 )             23.2     Radiology :  < from: Xray Chest 1 View- PORTABLE-Routine (07.17.19 @ 08:55) >    IMPRESSION:   Right upper extremity PICC line is looped at the level of the mid humerus   and terminates at the region of the axillary vein.    Redemonstrated left pleural effusion.     New right upper lung opacity.    < end of copied text >    Physical Exam:  Constitutional: AAOx0. NAD, non-toxic appearance, contracted  Neuro: PERRLA, grossly intact, no focal deficits  Respiratory: clear lungs bilaterally, non-labored, no accessory muscle use   Cardiovascular: +JVD, S1 S2. tachycardic, No murmurs.  Gastrointestinal: soft, ND/NT, +BS in all quadrants, +PEG  Extremities/Vascular: +PP b/l LE, +BLE edema, warm to touch      ASSESSMENT/PLAN:   HPI:  Patient is 69 year-old woman with advanced dementia and functional quadriplegia presents with fevers, leukocytosis in the setting of PEG dislodgement. Patient with acute kidney injury that is likely multifactorial. Now with tachycardia likely 2/2 resp failure.    # Tachycardia likely 2/2 respiratory failure        - 12 leads EKG       - CBC and ABG       - Tele monitor with continuous pulse Ox       - sat improved to 98%On 4L NC       - Standing order Lopressor given       - CXR on 7/17 shows new RUL opacity with L pleural effusion       - Cards and pulm on board, continue to follow recs       - Continue with Lasix gtt       - c/w current antimicrobial regimen       - c/w supportive care       - Continue close monitoring of clinical status and vital signs. HD stable       - will endorse to primary team in AM    Addendum        - EKG shows NSR with  without acute T/ST changes from previous       - Finding discussed with pt's  at bedside       - Pt maintains NSR 100s on tele       - HD stable. Will continue to monitor closely    Addendum @ 12:50       - Hgb 6.9 on ABG, Hgb 7.5 noted on CBC       - Will defer transfusion this time       - Potassium repleted       - Will follow up with CBC and BMP in AM       - HD stable      CYN CarrilloC

## 2019-07-19 NOTE — PROGRESS NOTE ADULT - SUBJECTIVE AND OBJECTIVE BOX
NEPHROLOGY[facility] [phone]        Patient seen and examined in bed.  She was about the same   No NV noted     ros-unable         MEDICATIONS  (STANDING):  apixaban 2.5 milliGRAM(s) Oral two times a day  artificial tears (preservative free) Ophthalmic Solution 1 Drop(s) Both EYES four times a day  ascorbic acid 500 milliGRAM(s) Oral daily  BACItracin   Ointment 1 Application(s) Topical two times a day  chlorhexidine 2% Cloths 1 Application(s) Topical daily  clonazePAM  Tablet 1 milliGRAM(s) Oral at bedtime  Dakins Solution - 1/4 Strength 1 Application(s) Topical two times a day  dextrose 5%. 1000 milliLiter(s) (50 mL/Hr) IV Continuous <Continuous>  famotidine    Tablet 20 milliGRAM(s) Oral daily  ferrous    sulfate Liquid 300 milliGRAM(s) Enteral Tube daily  formoterol for nebulization 20 MICROGram(s) Nebulizer two times a day  furosemide Infusion 5 mG/Hr (2.5 mL/Hr) IV Continuous <Continuous>  gabapentin   Solution 300 milliGRAM(s) Oral every 12 hours  hydrocortisone sodium succinate Injectable 25 milliGRAM(s) IV Push every 12 hours  insulin lispro (HumaLOG) corrective regimen sliding scale   SubCutaneous every 6 hours  levothyroxine Injectable 60 MICROGram(s) IV Push at bedtime  Medical Marijuana Awake Oil 2 milliLiter(s),Medical Marijuana Awake Oil 2 milliLiter(s) 2 milliLiter(s) Enteral Tube <User Schedule>  Medical Marijuana Calm Oil 2 milliLiter(s),Medical Marinjuana Calm Oil 2 milliLiter(s) 2 milliLiter(s) Enteral Tube <User Schedule>  Medical Marijuana [place] Oil 2 milliLiter(s),Medical Marijuana [place] Oil 2 milliLiter(s) 2 milliLiter(s) Enteral Tube <User Schedule>  meropenem  IVPB 500 milliGRAM(s) IV Intermittent every 24 hours  multivitamin/minerals/iron Oral Solution (CENTRUM) 15 milliLiter(s) Oral daily  QUEtiapine 25 milliGRAM(s) Oral at bedtime  sodium chloride 0.9%. 1000 milliLiter(s) (75 mL/Hr) IV Continuous <Continuous>  tiZANidine 4 milliGRAM(s) Oral <User Schedule>  zinc sulfate 220 milliGRAM(s) Oral daily      VITAL:  T(C): , Max: 37.1 (07-18-19 @ 17:00)  T(F): , Max: 98.7 (07-18-19 @ 17:00)  HR: 102 (07-19-19 @ 09:21)  BP: 140/89 (07-19-19 @ 09:21)  BP(mean): --  RR: 18 (07-19-19 @ 09:21)  SpO2: 99% (07-19-19 @ 09:21)  Wt(kg): --    I and O's:    07-18 @ 07:01  -  07-19 @ 07:00  --------------------------------------------------------  IN: 940 mL / OUT: 710 mL / NET: 230 mL          PHYSICAL EXAM:    Constitutional: NAD; cachetic   Neck:  +  JVD  Respiratory: poor effort   Cardiovascular: S1 and S2  Gastrointestinal: BS+, soft, NT/ND  Extremities: +  peripheral edema  Neurological: unable  : +  Blackman  Skin: No rashes  Access: Not applicable    LABS:                        7.1    21.78 )-----------( 324      ( 19 Jul 2019 08:30 )             23.2     07-19    134<L>  |  94<L>  |  123<H>  ----------------------------<  138<H>  3.8   |  19<L>  |  3.35<H>    Ca    8.6      19 Jul 2019 06:06            Urine Studies:          RADIOLOGY & ADDITIONAL STUDIES:

## 2019-07-19 NOTE — PROGRESS NOTE ADULT - SUBJECTIVE AND OBJECTIVE BOX
PULMONARY PROGRESS NOTE    MYRIAM HEATH  MRN-[id number]    Patient is a 69y old  Female who presents with a chief complaint of PEG tube dislodgement, fever, leukocytosis (19 Jul 2019 11:54)      HPI:  -  -    ROS:   -    ACTIVE MEDICATION LIST:  MEDICATIONS  (STANDING):  apixaban 2.5 milliGRAM(s) Oral two times a day  artificial tears (preservative free) Ophthalmic Solution 1 Drop(s) Both EYES four times a day  ascorbic acid 500 milliGRAM(s) Oral daily  BACItracin   Ointment 1 Application(s) Topical two times a day  chlorhexidine 2% Cloths 1 Application(s) Topical daily  clonazePAM  Tablet 1 milliGRAM(s) Oral at bedtime  Dakins Solution - 1/4 Strength 1 Application(s) Topical two times a day  dextrose 5%. 1000 milliLiter(s) (50 mL/Hr) IV Continuous <Continuous>  famotidine    Tablet 20 milliGRAM(s) Oral daily  ferrous    sulfate Liquid 300 milliGRAM(s) Enteral Tube daily  formoterol for nebulization 20 MICROGram(s) Nebulizer two times a day  furosemide Infusion 5 mG/Hr (2.5 mL/Hr) IV Continuous <Continuous>  gabapentin   Solution 300 milliGRAM(s) Oral every 12 hours  hydrocortisone sodium succinate Injectable 25 milliGRAM(s) IV Push every 12 hours  insulin lispro (HumaLOG) corrective regimen sliding scale   SubCutaneous every 6 hours  levothyroxine Injectable 60 MICROGram(s) IV Push at bedtime  Medical Marijuana Awake Oil 2 milliLiter(s),Medical Marijuana Awake Oil 2 milliLiter(s) 2 milliLiter(s) Enteral Tube <User Schedule>  Medical Marijuana Calm Oil 2 milliLiter(s),Medical Marinjuana Calm Oil 2 milliLiter(s) 2 milliLiter(s) Enteral Tube <User Schedule>  Medical Marijuana [place] Oil 2 milliLiter(s),Medical Marijuana [place] Oil 2 milliLiter(s) 2 milliLiter(s) Enteral Tube <User Schedule>  meropenem  IVPB 500 milliGRAM(s) IV Intermittent every 24 hours  multivitamin/minerals/iron Oral Solution (CENTRUM) 15 milliLiter(s) Oral daily  QUEtiapine 25 milliGRAM(s) Oral at bedtime  sodium chloride 0.9%. 1000 milliLiter(s) (75 mL/Hr) IV Continuous <Continuous>  tiZANidine 4 milliGRAM(s) Oral <User Schedule>  zinc sulfate 220 milliGRAM(s) Oral daily    MEDICATIONS  (PRN):  acetaminophen  Suppository .. 650 milliGRAM(s) Rectal every 6 hours PRN Temp greater or equal to 38C (100.4F), Mild Pain (1 - 3)  carboxymethylcellulose 0.5% (Preservative-Free) Ophthalmic Solution 1 Drop(s) Both EYES three times a day PRN dry eyes  glucagon  Injectable 1 milliGRAM(s) IntraMuscular once PRN Glucose LESS THAN 70 milligrams/deciliter  nystatin Powder 1 Application(s) Topical three times a day PRN under breasts      EXAM:  Vital Signs Last 24 Hrs  T(C): 36.7 (19 Jul 2019 09:21), Max: 37.1 (18 Jul 2019 17:00)  T(F): 98.1 (19 Jul 2019 09:21), Max: 98.7 (18 Jul 2019 17:00)  HR: 102 (19 Jul 2019 09:21) (96 - 141)  BP: 140/89 (19 Jul 2019 09:21) (118/79 - 146/86)  BP(mean): --  RR: 18 (19 Jul 2019 09:21) (16 - 18)  SpO2: 99% (19 Jul 2019 09:21) (96% - 100%)    GENERAL: The patient is awake and alert in no apparent distress.     LUNGS: Clear to auscultation without wheezing, rales or rhonchi; respirations unlabored    HEART: Regular rate and rhythm without murmur.                            7.1    21.78 )-----------( 324      ( 19 Jul 2019 08:30 )             23.2       07-19    134<L>  |  94<L>  |  123<H>  ----------------------------<  138<H>  3.8   |  19<L>  |  3.35<H>    Ca    8.6      19 Jul 2019 06:06          PROBLEM LIST:  69y Female with HEALTH ISSUES - PROBLEM Dx:  Fever: Fever  Type 2 diabetes mellitus without complication, without long-term current use of insulin:   Make sure you get your HgA1c checked every three months.  If you take oral diabetes medications, check your blood glucose two times a day.  If you take insulin, check your blood glucose before meals and at bedtime.  It&#x27;s important not to skip any meals.  Keep a log of your blood glucose results and always take it with you to your doctor appointments.  Keep a list of your current medications including injectables and over the counter medications and bring this medication list with you to all your doctor appointments.  If you have not seen your ophthalmologist this year call for appointment.  Check your feet daily for redness, sores, or openings. Do not self treat. If no improvement in two days call your primary care physician for an appointment.  Low blood sugar (hypoglycemia) is a blood sugar below 70mg/dl. Check your blood sugar if you feel signs/symptoms of hypoglycemia. If your blood sugar is below 70 take 15 grams of carbohydrates (ex 4 oz of apple juice, 3-4 glucose tablets, or 4-6 oz of regular soda) wait 15 minutes and repeat blood sugar to make sure it comes up above 70.  If your blood sugar is above 70 and you are due for a meal, have a meal.  If you are not due for a meal have a snack.  This snack helps keeps your blood sugar at a safe range.    Pressure injury of skin of sacral region, unspecified injury stage: Continue with wound care as instructed.   Maintain adequate nutrition, frequent repositioning , offloading with Zfloat boots.  Follow-up with your primary care physician and Wound Care Specialist within 1 week. Call for appointment.    Chronic deep vein thrombosis (DVT) of right lower extremity, unspecified vein: Take your &quot;blood thinners&quot; as prescribed.  Walking is encouraged, increase activity as tolerated.  If you develop new leg pain, swelling, and/or redness contact your healthcare provider.  If you develop new chest pain with difficulty breathing, a rapid heart rate and/or a feeling of passing out call emergency medical services 911.    Prophylactic measure: Prophylactic measure  Dementia without behavioral disturbance, unspecified dementia type: Continue with medications as prescribed by your doctor.  Maintain safe environment.  Maintain adequate nutrition, hydration.  Follow-up with your primary care physician within 1 week. Call for appointment.    Uncomplicated asthma, unspecified asthma severity, unspecified whether persistent: Stable.  Follow-up with your primary care physician within 1 week. Call for appointment.    Sepsis, due to unspecified organism: Take all antibiotics as ordered.  Call you Health care provider upon arrival home to make a one week follow up appointment.  If you develop fever, chills, malaise, or change in mental status call your Health Care Provider or go to the Emergency Department.  Nutrition is important, eat small frequent meals to help ensure you get adequate calories.  Do not stay in bed all day!  Increase your activity daily as tolerated.    PEG tube malfunction: PEG tube malfunction            RECS:        Please call with any questions.    Leigh Ann Resendez DO  [facility] Pulmonary/Sleep Medicine  [phone] PULMONARY PROGRESS NOTE    MYRIAM HEATH  MRN-[id number]    Patient is a 69y old  Female who presents with a chief complaint of PEG tube dislodgement, fever, leukocytosis (19 Jul 2019 11:54)      HPI:  on 2L  awake today    ROS:   -    ACTIVE MEDICATION LIST:  MEDICATIONS  (STANDING):  apixaban 2.5 milliGRAM(s) Oral two times a day  artificial tears (preservative free) Ophthalmic Solution 1 Drop(s) Both EYES four times a day  ascorbic acid 500 milliGRAM(s) Oral daily  BACItracin   Ointment 1 Application(s) Topical two times a day  chlorhexidine 2% Cloths 1 Application(s) Topical daily  clonazePAM  Tablet 1 milliGRAM(s) Oral at bedtime  Dakins Solution - 1/4 Strength 1 Application(s) Topical two times a day  dextrose 5%. 1000 milliLiter(s) (50 mL/Hr) IV Continuous <Continuous>  famotidine    Tablet 20 milliGRAM(s) Oral daily  ferrous    sulfate Liquid 300 milliGRAM(s) Enteral Tube daily  formoterol for nebulization 20 MICROGram(s) Nebulizer two times a day  furosemide Infusion 5 mG/Hr (2.5 mL/Hr) IV Continuous <Continuous>  gabapentin   Solution 300 milliGRAM(s) Oral every 12 hours  hydrocortisone sodium succinate Injectable 25 milliGRAM(s) IV Push every 12 hours  insulin lispro (HumaLOG) corrective regimen sliding scale   SubCutaneous every 6 hours  levothyroxine Injectable 60 MICROGram(s) IV Push at bedtime  Medical Marijuana Awake Oil 2 milliLiter(s),Medical Marijuana Awake Oil 2 milliLiter(s) 2 milliLiter(s) Enteral Tube <User Schedule>  Medical Marijuana Calm Oil 2 milliLiter(s),Medical Marinjuana Calm Oil 2 milliLiter(s) 2 milliLiter(s) Enteral Tube <User Schedule>  Medical Marijuana [place] Oil 2 milliLiter(s),Medical Marijuana [place] Oil 2 milliLiter(s) 2 milliLiter(s) Enteral Tube <User Schedule>  meropenem  IVPB 500 milliGRAM(s) IV Intermittent every 24 hours  multivitamin/minerals/iron Oral Solution (CENTRUM) 15 milliLiter(s) Oral daily  QUEtiapine 25 milliGRAM(s) Oral at bedtime  sodium chloride 0.9%. 1000 milliLiter(s) (75 mL/Hr) IV Continuous <Continuous>  tiZANidine 4 milliGRAM(s) Oral <User Schedule>  zinc sulfate 220 milliGRAM(s) Oral daily    MEDICATIONS  (PRN):  acetaminophen  Suppository .. 650 milliGRAM(s) Rectal every 6 hours PRN Temp greater or equal to 38C (100.4F), Mild Pain (1 - 3)  carboxymethylcellulose 0.5% (Preservative-Free) Ophthalmic Solution 1 Drop(s) Both EYES three times a day PRN dry eyes  glucagon  Injectable 1 milliGRAM(s) IntraMuscular once PRN Glucose LESS THAN 70 milligrams/deciliter  nystatin Powder 1 Application(s) Topical three times a day PRN under breasts      EXAM:  Vital Signs Last 24 Hrs  T(C): 36.7 (19 Jul 2019 09:21), Max: 37.1 (18 Jul 2019 17:00)  T(F): 98.1 (19 Jul 2019 09:21), Max: 98.7 (18 Jul 2019 17:00)  HR: 102 (19 Jul 2019 09:21) (96 - 141)  BP: 140/89 (19 Jul 2019 09:21) (118/79 - 146/86)  BP(mean): --  RR: 18 (19 Jul 2019 09:21) (16 - 18)  SpO2: 99% (19 Jul 2019 09:21) (96% - 100%)    GENERAL: The patient is awake  in no apparent distress.     LUNGS: Clear to auscultation without wheezing, rales or rhonchi; respirations unlabored    HEART: Regular rate and rhythm without murmur.                            7.1    21.78 )-----------( 324      ( 19 Jul 2019 08:30 )             23.2       07-19    134<L>  |  94<L>  |  123<H>  ----------------------------<  138<H>  3.8   |  19<L>  |  3.35<H>    Ca    8.6      19 Jul 2019 06:06    < from: Xray Chest 1 View- PORTABLE-Routine (07.17.19 @ 08:55) >      INTERPRETATION:  CLINICAL INFORMATION: Tachypnea    TECHNIQUE: Single frontal view of the chest.    COMPARISON: Chest x-ray from 7/13/2019, 7/6/2019.    FINDINGS:   Right upper extremity PICC line is looped at the level of the mid humerus   and terminates at the region of the axillary vein.  Redemonstrated left pleural effusion. New right upper lung opacity.  The heart size is normal.   The visualized osseous structures are unremarkable.    IMPRESSION:   Right upper extremity PICC line is looped at the level of the mid humerus   and terminates at the region of the axillary vein.    Redemonstrated left pleural effusion.     New right upper lung opacity.            < end of copied text >        PROBLEM LIST:  69y Female with HEALTH ISSUES - PROBLEM Dx:  Fever: Fever  Type 2 diabetes mellitus without complication, without long-term current use of insulin:   Make sure you get your HgA1c checked every three months.  If you take oral diabetes medications, check your blood glucose two times a day.  If you take insulin, check your blood glucose before meals and at bedtime.  It&#x27;s important not to skip any meals.  Keep a log of your blood glucose results and always take it with you to your doctor appointments.  Keep a list of your current medications including injectables and over the counter medications and bring this medication list with you to all your doctor appointments.  If you have not seen your ophthalmologist this year call for appointment.  Check your feet daily for redness, sores, or openings. Do not self treat. If no improvement in two days call your primary care physician for an appointment.  Low blood sugar (hypoglycemia) is a blood sugar below 70mg/dl. Check your blood sugar if you feel signs/symptoms of hypoglycemia. If your blood sugar is below 70 take 15 grams of carbohydrates (ex 4 oz of apple juice, 3-4 glucose tablets, or 4-6 oz of regular soda) wait 15 minutes and repeat blood sugar to make sure it comes up above 70.  If your blood sugar is above 70 and you are due for a meal, have a meal.  If you are not due for a meal have a snack.  This snack helps keeps your blood sugar at a safe range.    Pressure injury of skin of sacral region, unspecified injury stage: Continue with wound care as instructed.   Maintain adequate nutrition, frequent repositioning , offloading with Zfloat boots.  Follow-up with your primary care physician and Wound Care Specialist within 1 week. Call for appointment.    Chronic deep vein thrombosis (DVT) of right lower extremity, unspecified vein: Take your &quot;blood thinners&quot; as prescribed.  Walking is encouraged, increase activity as tolerated.  If you develop new leg pain, swelling, and/or redness contact your healthcare provider.  If you develop new chest pain with difficulty breathing, a rapid heart rate and/or a feeling of passing out call emergency medical services 911.    Prophylactic measure: Prophylactic measure  Dementia without behavioral disturbance, unspecified dementia type: Continue with medications as prescribed by your doctor.  Maintain safe environment.  Maintain adequate nutrition, hydration.  Follow-up with your primary care physician within 1 week. Call for appointment.    Uncomplicated asthma, unspecified asthma severity, unspecified whether persistent: Stable.  Follow-up with your primary care physician within 1 week. Call for appointment.    Sepsis, due to unspecified organism: Take all antibiotics as ordered.  Call you Health care provider upon arrival home to make a one week follow up appointment.  If you develop fever, chills, malaise, or change in mental status call your Health Care Provider or go to the Emergency Department.  Nutrition is important, eat small frequent meals to help ensure you get adequate calories.  Do not stay in bed all day!  Increase your activity daily as tolerated.    PEG tube malfunction: PEG tube malfunction            RECS:    restart chest PT  nebs around the clock  abx per ID  aspiration precautions   02 for comfort    Please call with any questions.    Leigh Ann Resendez, DO  [facility] Pulmonary/Sleep Medicine  [phone]

## 2019-07-19 NOTE — PROGRESS NOTE ADULT - SUBJECTIVE AND OBJECTIVE BOX
Patient is a 69y old  Female who presented with a chief complaint of PEG tube dislodgement, fever, leukocytosis (19 Jul 2019 10:00)      INTERVAL HPI/OVERNIGHT EVENTS:  tolerating G tube feeds, no leakage  TF rate increased to 30 cc/hour  no GI events  no melena or rectal bleeding      MEDICATIONS  (STANDING):  apixaban 2.5 milliGRAM(s) Oral two times a day  artificial tears (preservative free) Ophthalmic Solution 1 Drop(s) Both EYES four times a day  ascorbic acid 500 milliGRAM(s) Oral daily  BACItracin   Ointment 1 Application(s) Topical two times a day  chlorhexidine 2% Cloths 1 Application(s) Topical daily  clonazePAM  Tablet 1 milliGRAM(s) Oral at bedtime  Dakins Solution - 1/4 Strength 1 Application(s) Topical two times a day  dextrose 5%. 1000 milliLiter(s) (50 mL/Hr) IV Continuous <Continuous>  famotidine    Tablet 20 milliGRAM(s) Oral daily  ferrous    sulfate Liquid 300 milliGRAM(s) Enteral Tube daily  formoterol for nebulization 20 MICROGram(s) Nebulizer two times a day  furosemide Infusion 5 mG/Hr (2.5 mL/Hr) IV Continuous <Continuous>  gabapentin   Solution 300 milliGRAM(s) Oral every 12 hours  hydrocortisone sodium succinate Injectable 25 milliGRAM(s) IV Push every 12 hours  insulin lispro (HumaLOG) corrective regimen sliding scale   SubCutaneous every 6 hours  levothyroxine Injectable 60 MICROGram(s) IV Push at bedtime  Medical Marijuana Awake Oil 2 milliLiter(s),Medical Marijuana Awake Oil 2 milliLiter(s) 2 milliLiter(s) Enteral Tube <User Schedule>  Medical Marijuana Calm Oil 2 milliLiter(s),Medical Marinjuana Calm Oil 2 milliLiter(s) 2 milliLiter(s) Enteral Tube <User Schedule>  Medical Marijuana [place] Oil 2 milliLiter(s),Medical Marijuana [place] Oil 2 milliLiter(s) 2 milliLiter(s) Enteral Tube <User Schedule>  meropenem  IVPB 500 milliGRAM(s) IV Intermittent every 24 hours  multivitamin/minerals/iron Oral Solution (CENTRUM) 15 milliLiter(s) Oral daily  QUEtiapine 25 milliGRAM(s) Oral at bedtime  sodium chloride 0.9%. 1000 milliLiter(s) (75 mL/Hr) IV Continuous <Continuous>  tiZANidine 4 milliGRAM(s) Oral <User Schedule>  zinc sulfate 220 milliGRAM(s) Oral daily    MEDICATIONS  (PRN):  acetaminophen  Suppository .. 650 milliGRAM(s) Rectal every 6 hours PRN Temp greater or equal to 38C (100.4F), Mild Pain (1 - 3)  carboxymethylcellulose 0.5% (Preservative-Free) Ophthalmic Solution 1 Drop(s) Both EYES three times a day PRN dry eyes  glucagon  Injectable 1 milliGRAM(s) IntraMuscular once PRN Glucose LESS THAN 70 milligrams/deciliter  nystatin Powder 1 Application(s) Topical three times a day PRN under breasts      Allergies    ASA; dye contrast (Anaphylaxis)  aspirin (Short breath)  divalproex sodium (Other (Unknown))  Flowers and Plants (Short breath)  Haldol (Other (Unknown))  penicillin (Short breath; Rash)  sulfa drugs (Short breath; Rash)  vancomycin (Rash; Urticaria; Hives)  Xanax (Other (Unknown))        Review of Systems:  unable to obtain    Vital Signs Last 24 Hrs  T(C): 36.7 (19 Jul 2019 09:21), Max: 37.1 (18 Jul 2019 17:00)  T(F): 98.1 (19 Jul 2019 09:21), Max: 98.7 (18 Jul 2019 17:00)  HR: 102 (19 Jul 2019 09:21) (96 - 141)  BP: 140/89 (19 Jul 2019 09:21) (118/79 - 146/86)  BP(mean): --  RR: 18 (19 Jul 2019 09:21) (16 - 18)  SpO2: 99% (19 Jul 2019 09:21) (96% - 100%)    PHYSICAL EXAM:    Constitutional: frail elderly chronically ill appearing +severe contractures, non verbal  eyes open spouse at bedside +anasarca  Neck: No JVD  Respiratory: + rhonchi , decreased BS bases  Cardiovascular: S1 and S2 tachy  Gastrointestinal: BS+, soft, +G tube  high in epigastric area  bumper noted to be at 2-3 cm richard. lightly touching skin and spins freely 360 degrees.    Extremities: anasarca/+edema +dressings in place  Vascular: 2+ peripheral pulses  Neurological: lethargic, no focal asymmetry  Psychiatric: non verbal   Skin: anicteric  +skin dressings and heel pad cushions in place  multiple decubiti (wound care following)  LABS:                        7.1    21.78 )-----------( 324      ( 19 Jul 2019 08:30 )             23.2     Hemoglobin: 8.5 g/dL <L> [11.5 - 15.5] (07-18 @ 12:32)  Hemoglobin: 7.5 g/dL <L> [11.5 - 15.5] (07-18 @ 08:32)  Hemoglobin: 8.3 g/dL <L> [11.5 - 15.5] (07-17 @ 09:27)        07-19    134<L>  |  94<L>  |  123<H>  ----------------------------<  138<H>  3.8   |  19<L>  |  3.35<H>    Ca    8.6      19 Jul 2019 06:06      PTT - ( 18 Jul 2019 06:29 )  PTT:33.2 sec        RADIOLOGY & ADDITIONAL TESTS:

## 2019-07-19 NOTE — CHART NOTE - NSCHARTNOTEFT_GEN_A_CORE
Nutrition Note    Chart reviewed and events noted. Per nephrologist, pt's BUN elevated and plan to reduce enteral feed goal rate to Nepro @ 25cc/hr x 24 hrs. Enteral feed will provide 600cc total volume 1080kcal and 49g protein (28kcal/kg and 1.2g/kg protein based on dosing wt obtained from 38.6kg 6/24; 20kcal/kg and 0.9g/kg protein based on current wt 55.2kg). RD remains available.

## 2019-07-19 NOTE — PROGRESS NOTE ADULT - ASSESSMENT
Patient is 69 year-old woman with advanced dementia and functional quadriplegia presents with fevers, leukocytosis in the setting of PEG dislodgement. Patient with acute kidney injury that is likely multifactorial.   Patient appears total volume overloaded with +JVD and diffuse edema/anasarca in the setting of worsening renal function. Improving urine output on Lasix gtt.  No objection to low dose beta-blocker with hold parameters for heart rate and blood pressure.    Antibiotics per ID.  PEG management and feeds per GI/nutrition.    Appreciate nephrology input: currently on Lasix gtt.  Patient is hypervolemic, but is now starting to diurese - hopefully, this is the diuretic phase of ATN and renal function starts to improve.     Trend daily labs. Avoid nephrotoxic agents.

## 2019-07-19 NOTE — PROGRESS NOTE ADULT - SUBJECTIVE AND OBJECTIVE BOX
HPI:  Patient seen and examined on [address].  Remains tachycardic.  Renal function noted.    Review Of Systems:   Unable to assess in the setting of advanced dementia    Medications:  acetaminophen  Suppository .. 650 milliGRAM(s) Rectal every 6 hours PRN  apixaban 2.5 milliGRAM(s) Oral two times a day  artificial tears (preservative free) Ophthalmic Solution 1 Drop(s) Both EYES four times a day  ascorbic acid 500 milliGRAM(s) Oral daily  BACItracin   Ointment 1 Application(s) Topical two times a day  carboxymethylcellulose 0.5% (Preservative-Free) Ophthalmic Solution 1 Drop(s) Both EYES three times a day PRN  chlorhexidine 2% Cloths 1 Application(s) Topical daily  clonazePAM  Tablet 1 milliGRAM(s) Oral at bedtime  [place]ins Solution - 1/4 Strength 1 Application(s) Topical two times a day  dextrose 5%. 1000 milliLiter(s) IV Continuous <Continuous>  famotidine    Tablet 20 milliGRAM(s) Oral daily  ferrous    sulfate Liquid 300 milliGRAM(s) Enteral Tube daily  formoterol for nebulization 20 MICROGram(s) Nebulizer two times a day  furosemide Infusion 5 mG/Hr IV Continuous <Continuous>  gabapentin   Solution 300 milliGRAM(s) Oral every 12 hours  glucagon  Injectable 1 milliGRAM(s) IntraMuscular once PRN  hydrocortisone sodium succinate Injectable 25 milliGRAM(s) IV Push every 12 hours  insulin lispro (HumaLOG) corrective regimen sliding scale   SubCutaneous every 6 hours  levothyroxine Injectable 60 MICROGram(s) IV Push at bedtime  Medical Marijuana Awake Oil 2 milliLiter(s),Medical Marijuana Awake Oil 2 milliLiter(s) 2 milliLiter(s) Enteral Tube <User Schedule>  Medical Marijuana Calm Oil 2 milliLiter(s),Medical [place] Calm Oil 2 milliLiter(s) 2 milliLiter(s) Enteral Tube <User Schedule>  Medical Marijuana [place] Oil 2 milliLiter(s),Medical Marijuana [place] Oil 2 milliLiter(s) 2 milliLiter(s) Enteral Tube <User Schedule>  meropenem  IVPB 500 milliGRAM(s) IV Intermittent every 24 hours  multivitamin/minerals/iron Oral Solution (CENTRUM) 15 milliLiter(s) Oral daily  nystatin Powder 1 Application(s) Topical three times a day PRN  QUEtiapine 25 milliGRAM(s) Oral at bedtime  sodium chloride 0.9%. 1000 milliLiter(s) IV Continuous <Continuous>  tiZANidine 4 milliGRAM(s) Oral <User Schedule>  zinc sulfate 220 milliGRAM(s) Oral daily    PAST MEDICAL & SURGICAL HISTORY:  Type 2 diabetes mellitus  Dementia  DVT, lower extremity  CVA (cerebral vascular accident)  Fatty pancreas  PNA (pneumonia)  Pulmonary HTN  IGT (impaired glucose tolerance)  Ulcerative colitis  Acid reflux  Anxiety  Depression  Mouth sores  HLD (hyperlipidemia)  Asthma  S/P percutaneous endoscopic gastrostomy (PEG) tube placement: for dysphagia  Humeral head fracture  H/O: hysterectomy  H/O cataract extraction, left  History of knee replacement    Vitals:  T(C): 37 (07-19-19 @ 18:00), Max: 37.1 (07-19-19 @ 17:00)  HR: 134 (07-19-19 @ 17:00) (96 - 141)  BP: 122/79 (07-19-19 @ 17:00) (118/79 - 146/86)  BP(mean): --  RR: 18 (07-19-19 @ 17:00) (16 - 18)  SpO2: 99% (07-19-19 @ 17:00) (96% - 100%)  Wt(kg): --  Daily     Daily   I&O's Summary    18 Jul 2019 07:01  -  19 Jul 2019 07:00  --------------------------------------------------------  IN: 940 mL / OUT: 710 mL / NET: 230 mL    19 Jul 2019 07:01  -  19 Jul 2019 18:20  --------------------------------------------------------  IN: 575 mL / OUT: 410 mL / NET: 165 mL        Physical Exam:  Appearance: functional quadriplegia   Eyes: PERRLA, EOMI, pink conjunctiva, no scleral icterus   HENT: Normal oral mucosa  Cardiovascular: tachycardic S1, S2, no murmur, rub, or gallop; NO edema in hands or feet; no JVD  Procedural Access Site: Clean, dry, intact, without hematoma  Respiratory: bilateral air entry; poor inspiratory effort  Gastrointestinal: Soft, non-tender, non-distended, BS+, +PEG  Musculoskeletal: +contracted  Neurologic: functional quadriplegia   Lymphatic: No lymphadenopathy  Psychiatry: advanced dementia  Skin: No rashes, ecchymoses, or cyanosis on extremities                        7.1    21.78 )-----------( 324      ( 19 Jul 2019 08:30 )             23.2     07-19    134<L>  |  94<L>  |  123<H>  ----------------------------<  138<H>  3.8   |  19<L>  |  3.35<H>    Ca    8.6      19 Jul 2019 06:06      PTT - ( 18 Jul 2019 06:29 )  PTT:33.2 sec      Serum Pro-Brain Natriuretic Peptide: >70968 pg/mL (07-16 @ 12:00)  Serum Pro-Brain Natriuretic Peptide: 13249 pg/mL (07-13 @ 05:37)

## 2019-07-19 NOTE — PROVIDER CONTACT NOTE (OTHER) - ASSESSMENT
pt alox0. nonverbal  vital signs as charted. HR went as high as 200, O2: 84% on 2L NC. Increased O2 to 4L, O2: 98%, HR: 147. Pt due for 12.5 mg of metoprolol

## 2019-07-19 NOTE — PROGRESS NOTE ADULT - SUBJECTIVE AND OBJECTIVE BOX
infectious diseases progress note:    Patient is a 69y old  Female who presents with a chief complaint of PEG tube dislodgement, fever, leukocytosis (18 Jul 2019 15:29)        Gastrostomy malfunction        R   Allergies    ASA; dye contrast (Anaphylaxis)  aspirin (Short breath)  divalproex sodium (Other (Unknown))  Flowers and Plants (Short breath)  Haldol (Other (Unknown))  penicillin (Short breath; Rash)  sulfa drugs (Short breath; Rash)  vancomycin (Rash; Urticaria; Hives)  Xanax (Other (Unknown))    Intolerances        ANTIBIOTICS/RELEVANT:  antimicrobials  meropenem  IVPB 500 milliGRAM(s) IV Intermittent every 24 hours    immunologic:    OTHER:  acetaminophen  Suppository .. 650 milliGRAM(s) Rectal every 6 hours PRN  apixaban 2.5 milliGRAM(s) Oral two times a day  artificial tears (preservative free) Ophthalmic Solution 1 Drop(s) Both EYES four times a day  ascorbic acid 500 milliGRAM(s) Oral daily  BACItracin   Ointment 1 Application(s) Topical two times a day  carboxymethylcellulose 0.5% (Preservative-Free) Ophthalmic Solution 1 Drop(s) Both EYES three times a day PRN  chlorhexidine 2% Cloths 1 Application(s) Topical daily  clonazePAM  Tablet 1 milliGRAM(s) Oral at bedtime  Dakins Solution - 1/4 Strength 1 Application(s) Topical two times a day  dextrose 5%. 1000 milliLiter(s) IV Continuous <Continuous>  famotidine    Tablet 20 milliGRAM(s) Oral daily  ferrous    sulfate Liquid 300 milliGRAM(s) Enteral Tube daily  formoterol for nebulization 20 MICROGram(s) Nebulizer two times a day  furosemide Infusion 10 mG/Hr IV Continuous <Continuous>  gabapentin   Solution 300 milliGRAM(s) Oral every 12 hours  glucagon  Injectable 1 milliGRAM(s) IntraMuscular once PRN  hydrocortisone sodium succinate Injectable 25 milliGRAM(s) IV Push every 12 hours  insulin lispro (HumaLOG) corrective regimen sliding scale   SubCutaneous every 6 hours  levothyroxine Injectable 60 MICROGram(s) IV Push at bedtime  Medical Marijuana Awake Oil 2 milliLiter(s),Medical Marijuana Awake Oil 2 milliLiter(s) 2 milliLiter(s) Enteral Tube <User Schedule>  Medical Marijuana Calm Oil 2 milliLiter(s),Medical [place]ju[place] Calm Oil 2 milliLiter(s) 2 milliLiter(s) Enteral Tube <User Schedule>  Medical Marijuana [place] Oil 2 milliLiter(s),Medical Marijuana [place] Oil 2 milliLiter(s) 2 milliLiter(s) Enteral Tube <User Schedule>  multivitamin/minerals/iron Oral Solution (CENTRUM) 15 milliLiter(s) Oral daily  nystatin Powder 1 Application(s) Topical three times a day PRN  QUEtiapine 25 milliGRAM(s) Oral at bedtime  sodium chloride 0.9%. 1000 milliLiter(s) IV Continuous <Continuous>  tiZANidine 4 milliGRAM(s) Oral <User Schedule>  zinc sulfate 220 milliGRAM(s) Oral daily      Objective:  Vital Signs Last 24 Hrs  T(C): 36.5 (19 Jul 2019 06:00), Max: 37.1 (18 Jul 2019 17:00)  T(F): 97.7 (19 Jul 2019 06:00), Max: 98.7 (18 Jul 2019 17:00)  HR: 96 (19 Jul 2019 06:00) (96 - 141)  BP: 146/86 (19 Jul 2019 06:00) (118/79 - 146/86)  BP(mean): --  RR: 18 (19 Jul 2019 06:00) (16 - 18)  SpO2: 100% (19 Jul 2019 06:00) (96% - 100%)       Eyes:JACQUELIN, EOMI  Ear/Nose/Throat: no oral lesion, no sinus tenderness on percussion	  Neck:no JVD, no lymphadenopathy, supple  Respiratory: CTA maryjane  Cardiovascular: S1S2 RRR, no murmurs  Gastrointestinal:soft, (+) BS, no HSM   contracted         LABS:                        8.5    23.2  )-----------( 311      ( 18 Jul 2019 12:32 )             24.9     07-19    134<L>  |  94<L>  |  123<H>  ----------------------------<  138<H>  3.8   |  19<L>  |  3.35<H>    Ca    8.6      19 Jul 2019 06:06      PTT - ( 18 Jul 2019 06:29 )  PTT:33.2 sec        MICROBIOLOGY:    RECENT CULTURES:  07-13 @ 10:20 .Urine                >=3 organisms. Probable collection contamination.    07-12 @ 18:59 .Blood                No growth at 5 days.    07-12 @ 16:59 .Blood                No growth at 5 days.          RESPIRATORY CULTURES:              RADIOLOGY & ADDITIONAL STUDIES:        Pager [phone]  After 5 pm/weekends or if no response :[phone]

## 2019-07-20 LAB
ANION GAP SERPL CALC-SCNC: 22 MMOL/L — HIGH (ref 5–17)
BUN SERPL-MCNC: 126 MG/DL — HIGH (ref 7–23)
CALCIUM SERPL-MCNC: 8.4 MG/DL — SIGNIFICANT CHANGE UP (ref 8.4–10.5)
CHLORIDE SERPL-SCNC: 97 MMOL/L — SIGNIFICANT CHANGE UP (ref 96–108)
CO2 SERPL-SCNC: 19 MMOL/L — LOW (ref 22–31)
CREAT SERPL-MCNC: 3.37 MG/DL — HIGH (ref 0.5–1.3)
GAS PNL BLDA: SIGNIFICANT CHANGE UP
GLUCOSE BLDC GLUCOMTR-MCNC: 119 MG/DL — HIGH (ref 70–99)
GLUCOSE BLDC GLUCOMTR-MCNC: 120 MG/DL — HIGH (ref 70–99)
GLUCOSE BLDC GLUCOMTR-MCNC: 135 MG/DL — HIGH (ref 70–99)
GLUCOSE BLDC GLUCOMTR-MCNC: 138 MG/DL — HIGH (ref 70–99)
GLUCOSE BLDC GLUCOMTR-MCNC: 150 MG/DL — HIGH (ref 70–99)
GLUCOSE SERPL-MCNC: 128 MG/DL — HIGH (ref 70–99)
HCT VFR BLD CALC: 20.9 % — CRITICAL LOW (ref 34.5–45)
HCT VFR BLD CALC: 21.6 % — LOW (ref 34.5–45)
HCT VFR BLD CALC: 23.1 % — LOW (ref 34.5–45)
HGB BLD-MCNC: 6.9 G/DL — CRITICAL LOW (ref 11.5–15.5)
HGB BLD-MCNC: 7.5 G/DL — LOW (ref 11.5–15.5)
HGB BLD-MCNC: 8.1 G/DL — LOW (ref 11.5–15.5)
MCHC RBC-ENTMCNC: 28.9 PG — SIGNIFICANT CHANGE UP (ref 27–34)
MCHC RBC-ENTMCNC: 30.7 PG — SIGNIFICANT CHANGE UP (ref 27–34)
MCHC RBC-ENTMCNC: 31.3 PG — SIGNIFICANT CHANGE UP (ref 27–34)
MCHC RBC-ENTMCNC: 31.9 GM/DL — LOW (ref 32–36)
MCHC RBC-ENTMCNC: 35 GM/DL — SIGNIFICANT CHANGE UP (ref 32–36)
MCHC RBC-ENTMCNC: 35.8 GM/DL — SIGNIFICANT CHANGE UP (ref 32–36)
MCV RBC AUTO: 87.5 FL — SIGNIFICANT CHANGE UP (ref 80–100)
MCV RBC AUTO: 87.7 FL — SIGNIFICANT CHANGE UP (ref 80–100)
MCV RBC AUTO: 90.4 FL — SIGNIFICANT CHANGE UP (ref 80–100)
PLATELET # BLD AUTO: 350 K/UL — SIGNIFICANT CHANGE UP (ref 150–400)
PLATELET # BLD AUTO: 353 K/UL — SIGNIFICANT CHANGE UP (ref 150–400)
PLATELET # BLD AUTO: 375 K/UL — SIGNIFICANT CHANGE UP (ref 150–400)
POTASSIUM SERPL-MCNC: 4.2 MMOL/L — SIGNIFICANT CHANGE UP (ref 3.5–5.3)
POTASSIUM SERPL-SCNC: 4.2 MMOL/L — SIGNIFICANT CHANGE UP (ref 3.5–5.3)
RBC # BLD: 2.39 M/UL — LOW (ref 3.8–5.2)
RBC # BLD: 2.39 M/UL — LOW (ref 3.8–5.2)
RBC # BLD: 2.64 M/UL — LOW (ref 3.8–5.2)
RBC # FLD: 14.4 % — SIGNIFICANT CHANGE UP (ref 10.3–14.5)
RBC # FLD: 14.7 % — HIGH (ref 10.3–14.5)
RBC # FLD: 15.2 % — HIGH (ref 10.3–14.5)
SODIUM SERPL-SCNC: 138 MMOL/L — SIGNIFICANT CHANGE UP (ref 135–145)
WBC # BLD: 23 K/UL — HIGH (ref 3.8–10.5)
WBC # BLD: 24.51 K/UL — HIGH (ref 3.8–10.5)
WBC # BLD: 29.7 K/UL — HIGH (ref 3.8–10.5)
WBC # FLD AUTO: 23 K/UL — HIGH (ref 3.8–10.5)
WBC # FLD AUTO: 24.51 K/UL — HIGH (ref 3.8–10.5)
WBC # FLD AUTO: 29.7 K/UL — HIGH (ref 3.8–10.5)

## 2019-07-20 PROCEDURE — 99232 SBSQ HOSP IP/OBS MODERATE 35: CPT

## 2019-07-20 RX ORDER — POTASSIUM CHLORIDE 20 MEQ
15 PACKET (EA) ORAL ONCE
Refills: 0 | Status: COMPLETED | OUTPATIENT
Start: 2019-07-20 | End: 2019-07-20

## 2019-07-20 RX ADMIN — Medication 1 DROP(S): at 17:49

## 2019-07-20 RX ADMIN — Medication 20 MICROGRAM(S): at 17:31

## 2019-07-20 RX ADMIN — Medication 1 APPLICATION(S): at 17:49

## 2019-07-20 RX ADMIN — APIXABAN 2.5 MILLIGRAM(S): 2.5 TABLET, FILM COATED ORAL at 06:14

## 2019-07-20 RX ADMIN — Medication 12.5 MILLIGRAM(S): at 21:38

## 2019-07-20 RX ADMIN — Medication 500 MILLIGRAM(S): at 12:37

## 2019-07-20 RX ADMIN — Medication 1 DROP(S): at 12:37

## 2019-07-20 RX ADMIN — CHLORHEXIDINE GLUCONATE 1 APPLICATION(S): 213 SOLUTION TOPICAL at 12:38

## 2019-07-20 RX ADMIN — Medication 15 MILLIEQUIVALENT(S): at 01:16

## 2019-07-20 RX ADMIN — Medication 1 DROP(S): at 17:50

## 2019-07-20 RX ADMIN — MEROPENEM 100 MILLIGRAM(S): 1 INJECTION INTRAVENOUS at 06:14

## 2019-07-20 RX ADMIN — ZINC SULFATE TAB 220 MG (50 MG ZINC EQUIVALENT) 220 MILLIGRAM(S): 220 (50 ZN) TAB at 12:36

## 2019-07-20 RX ADMIN — TIZANIDINE 4 MILLIGRAM(S): 4 TABLET ORAL at 21:38

## 2019-07-20 RX ADMIN — Medication 300 MILLIGRAM(S): at 12:36

## 2019-07-20 RX ADMIN — GABAPENTIN 300 MILLIGRAM(S): 400 CAPSULE ORAL at 22:20

## 2019-07-20 RX ADMIN — Medication 20 MICROGRAM(S): at 06:16

## 2019-07-20 RX ADMIN — GABAPENTIN 300 MILLIGRAM(S): 400 CAPSULE ORAL at 09:03

## 2019-07-20 RX ADMIN — Medication 1 APPLICATION(S): at 06:15

## 2019-07-20 RX ADMIN — Medication 15 MILLILITER(S): at 12:36

## 2019-07-20 RX ADMIN — Medication 12.5 MILLIGRAM(S): at 09:03

## 2019-07-20 RX ADMIN — TIZANIDINE 4 MILLIGRAM(S): 4 TABLET ORAL at 09:03

## 2019-07-20 RX ADMIN — Medication 25 MILLIGRAM(S): at 17:30

## 2019-07-20 RX ADMIN — FAMOTIDINE 20 MILLIGRAM(S): 10 INJECTION INTRAVENOUS at 12:36

## 2019-07-20 RX ADMIN — Medication 1 DROP(S): at 06:14

## 2019-07-20 RX ADMIN — Medication 25 MILLIGRAM(S): at 06:13

## 2019-07-20 RX ADMIN — QUETIAPINE FUMARATE 25 MILLIGRAM(S): 200 TABLET, FILM COATED ORAL at 21:45

## 2019-07-20 RX ADMIN — Medication 1 APPLICATION(S): at 17:38

## 2019-07-20 RX ADMIN — APIXABAN 2.5 MILLIGRAM(S): 2.5 TABLET, FILM COATED ORAL at 17:30

## 2019-07-20 RX ADMIN — Medication 1 MILLIGRAM(S): at 21:48

## 2019-07-20 RX ADMIN — Medication 60 MICROGRAM(S): at 21:48

## 2019-07-20 NOTE — PROGRESS NOTE ADULT - SUBJECTIVE AND OBJECTIVE BOX
Patient seen and examined in bed; non-communicative; A&Ox1; resting comfortably     REVIEW OF SYSTEMS:  RUBEN; confused    MEDICATIONS  (STANDING):  apixaban 2.5 milliGRAM(s) Oral two times a day  artificial tears (preservative free) Ophthalmic Solution 1 Drop(s) Both EYES four times a day  ascorbic acid 500 milliGRAM(s) Oral daily  BACItracin   Ointment 1 Application(s) Topical two times a day  chlorhexidine 2% Cloths 1 Application(s) Topical daily  clonazePAM  Tablet 1 milliGRAM(s) Oral at bedtime  Dakins Solution - 1/4 Strength 1 Application(s) Topical two times a day  dextrose 5%. 1000 milliLiter(s) (50 mL/Hr) IV Continuous <Continuous>  famotidine    Tablet 20 milliGRAM(s) Oral daily  ferrous    sulfate Liquid 300 milliGRAM(s) Enteral Tube daily  formoterol for nebulization 20 MICROGram(s) Nebulizer two times a day  furosemide Infusion 5 mG/Hr (2.5 mL/Hr) IV Continuous <Continuous>  gabapentin   Solution 300 milliGRAM(s) Oral every 12 hours  hydrocortisone sodium succinate Injectable 25 milliGRAM(s) IV Push every 12 hours  insulin lispro (HumaLOG) corrective regimen sliding scale   SubCutaneous every 6 hours  levothyroxine Injectable 60 MICROGram(s) IV Push at bedtime  Medical Marijuana Awake Oil 2 milliLiter(s),Medical Marijuana Awake Oil 2 milliLiter(s) 2 milliLiter(s) Enteral Tube <User Schedule>  Medical Marijuana Calm Oil 2 milliLiter(s),Medical [place]ju[place] Calm Oil 2 milliLiter(s) 2 milliLiter(s) Enteral Tube <User Schedule>  Medical Marijuana [place] Oil 2 milliLiter(s),Medical Marijuana [place] Oil 2 milliLiter(s) 2 milliLiter(s) Enteral Tube <User Schedule>  meropenem  IVPB 500 milliGRAM(s) IV Intermittent every 24 hours  metoprolol tartrate 12.5 milliGRAM(s) Oral two times a day  multivitamin/minerals/iron Oral Solution (CENTRUM) 15 milliLiter(s) Oral daily  QUEtiapine 25 milliGRAM(s) Oral at bedtime  sodium chloride 0.9%. 1000 milliLiter(s) (75 mL/Hr) IV Continuous <Continuous>  tiZANidine 4 milliGRAM(s) Oral <User Schedule>  zinc sulfate 220 milliGRAM(s) Oral daily      VITAL:  T(C): , Max: 37.2 (07-19-19 @ 18:56)  T(F): , Max: 99 (07-19-19 @ 18:56)  HR: 101 (07-20-19 @ 09:00)  BP: 116/71 (07-20-19 @ 09:00)  BP(mean): --  RR: 18 (07-20-19 @ 09:00)  SpO2: 99% (07-20-19 @ 09:00)  Wt(kg): --    I and O's:    07-19 @ 07:01  -  07-20 @ 07:00  --------------------------------------------------------  IN: 955 mL / OUT: 535 mL / NET: 420 mL    07-20 @ 07:01  -  07-20 @ 10:12  --------------------------------------------------------  IN: 105 mL / OUT: 35 mL / NET: 70 mL          PHYSICAL EXAM:    Constitutional: NAD  HEENT: PERRLA, EOMI,  MMM  Neck: No LAD, + JVD  Respiratory: diminished b/l  Cardiovascular: S1 and S2  Gastrointestinal: BS+, soft, NT/ND  Extremities: No peripheral edema  Neurological: RUBEN  : + Gavin  Skin: No rashes  Access: Not applicable    LABS:                        7.5    23.0  )-----------( 350      ( 20 Jul 2019 00:09 )             20.9     07-19    134<L>  |  94<L>  |  123<H>  ----------------------------<  138<H>  3.8   |  19<L>  |  3.35<H>    Ca    8.6      19 Jul 2019 06:06    Assessment and Plan:   · Assessment	  69 year old female with multiple prolonged hospitalizations, known to Dr Trust from our office, with PMH of Advanced dementia (nonverbal, dysphagia, with PEG tube, functional quadriplegic, chronic Gavin catheter) sacral state 4 pressure ulcer, heel pressure ulcers, asthma on chronic prednisone, HLD, CVA, s is, 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 since admission, went to IR to have PEG replaced.  TRANG and hyperkalemia.    1. TRANG likely multifactorial with Creatinine rising as of late; repeat BMP pending   2. Hyponatremia from TRANG; stable as of late   3. Hypercalcemia, stable; repeat pending   4. Anemia; Hgb slowly improving though <goal   5. Volume overloaded; remains on Bumex gtt; /24hours;  has expressed to Dr. Ramos that he is not interested in HD at this time     RECOMMEND:  - F/u BMP  - No objection to continue Lasix @ 2.5 mg/hr for now  - Maintain gavin catheter for now  - Strict I & Os  - TF management per primary team; continue TF rate of 25cc/hour   - Continue to trend H/H; suggest PRBC for Hgb < 7.0; transfuse per primary team

## 2019-07-20 NOTE — PROGRESS NOTE ADULT - SUBJECTIVE AND OBJECTIVE BOX
---___---___---___---___---___---___ ---___---___---___---___---___---___---___---___---                  M E D I C A L   A T T E N D I N G   P R O G R E S S   N O T E  ---___---___---___---___---___---___ ---___---___---___---___---___---___---___---___---        ================================================    ++CHIEF COMPLAINT:   Patient is a 69y old  Female who presents with a chief complaint of PEG tube dislodgement, fever, leukocytosis (2019 10:43)      libia still trending higher     ---___---___---___---___---___---  PAST MEDICAL / Surgical  HISTORY:  PAST MEDICAL & SURGICAL HISTORY:  Type 2 diabetes mellitus  Dementia  DVT, lower extremity  CVA (cerebral vascular accident)  Fatty pancreas  PNA (pneumonia)  Pulmonary HTN  IGT (impaired glucose tolerance)  Ulcerative colitis  Acid reflux  Anxiety  Depression  Mouth sores  HLD (hyperlipidemia)  Asthma  S/P percutaneous endoscopic gastrostomy (PEG) tube placement: for dysphagia  Humeral head fracture  H/O: hysterectomy  H/O cataract extraction, left  History of knee replacement      ---___---___---___---___---___---  FAMILY HISTORY:   FAMILY HISTORY:  Family history of dementia (Grandparent)  Family history of colon cancer (Grandparent)        ---___---___---___---___---___---  ALLERGIES:   Allergies    ASA; dye contrast (Anaphylaxis)  aspirin (Short breath)  divalproex sodium (Other (Unknown))  Flowers and Plants (Short breath)  Haldol (Other (Unknown))  penicillin (Short breath; Rash)  sulfa drugs (Short breath; Rash)  vancomycin (Rash; Urticaria; Hives)  Xanax (Other (Unknown))    Intolerances        ---___---___---___---___---___---  MEDICATIONS:  MEDICATIONS  (STANDING):  apixaban 2.5 milliGRAM(s) Oral two times a day  artificial tears (preservative free) Ophthalmic Solution 1 Drop(s) Both EYES four times a day  ascorbic acid 500 milliGRAM(s) Oral daily  BACItracin   Ointment 1 Application(s) Topical two times a day  chlorhexidine 2% Cloths 1 Application(s) Topical daily  clonazePAM  Tablet 1 milliGRAM(s) Oral at bedtime  Dakins Solution - 1/4 Strength 1 Application(s) Topical two times a day  dextrose 5%. 1000 milliLiter(s) (50 mL/Hr) IV Continuous <Continuous>  famotidine    Tablet 20 milliGRAM(s) Oral daily  ferrous    sulfate Liquid 300 milliGRAM(s) Enteral Tube daily  formoterol for nebulization 20 MICROGram(s) Nebulizer two times a day  furosemide Infusion 5 mG/Hr (2.5 mL/Hr) IV Continuous <Continuous>  gabapentin   Solution 300 milliGRAM(s) Oral every 12 hours  hydrocortisone sodium succinate Injectable 25 milliGRAM(s) IV Push every 12 hours  insulin lispro (HumaLOG) corrective regimen sliding scale   SubCutaneous every 6 hours  levothyroxine Injectable 60 MICROGram(s) IV Push at bedtime  Medical Marijuana Awake Oil 2 milliLiter(s),Medical Marijuana Awake Oil 2 milliLiter(s) 2 milliLiter(s) Enteral Tube <User Schedule>  Medical Marijuana Calm Oil 2 milliLiter(s),Medical [place] Calm Oil 2 milliLiter(s) 2 milliLiter(s) Enteral Tube <User Schedule>  Medical Marijuana [place] Oil 2 milliLiter(s),Medical Marijuana [place] Oil 2 milliLiter(s) 2 milliLiter(s) Enteral Tube <User Schedule>  meropenem  IVPB 500 milliGRAM(s) IV Intermittent every 24 hours  metoprolol tartrate 12.5 milliGRAM(s) Oral two times a day  multivitamin/minerals/iron Oral Solution (CENTRUM) 15 milliLiter(s) Oral daily  QUEtiapine 25 milliGRAM(s) Oral at bedtime  sodium chloride 0.9%. 1000 milliLiter(s) (75 mL/Hr) IV Continuous <Continuous>  tiZANidine 4 milliGRAM(s) Oral <User Schedule>  zinc sulfate 220 milliGRAM(s) Oral daily    MEDICATIONS  (PRN):  acetaminophen  Suppository .. 650 milliGRAM(s) Rectal every 6 hours PRN Temp greater or equal to 38C (100.4F), Mild Pain (1 - 3)  carboxymethylcellulose 0.5% (Preservative-Free) Ophthalmic Solution 1 Drop(s) Both EYES three times a day PRN dry eyes  glucagon  Injectable 1 milliGRAM(s) IntraMuscular once PRN Glucose LESS THAN 70 milligrams/deciliter  nystatin Powder 1 Application(s) Topical three times a day PRN under breasts      ---___---___---___---___---___---  REVIEW OF SYSTEM:    unable to obtain     ---___---___---___---___---___---  VITAL SIGNS:  69y , CAPILLARY BLOOD GLUCOSE      POCT Blood Glucose.: 150 mg/dL (2019 13:38)    T(C): 36.6 (19 @ 13:00), Max: 37.2 (19 @ 18:56)  HR: 98 (19 @ 13:00) (98 - 148)  BP: 131/76 (19 @ 13:00) (112/68 - 145/93)  RR: 18 (19 @ 13:00) (16 - 18)  SpO2: 96% (19 @ 13:00) (96% - 100%)  ---___---___---___---___---___---  PHYSICAL EXAM:    GEN: A&O X 0, NAD , comfortable  HEENT: NCAT, PERRL, MMM, hearing intact  Neck: supple , no JVD  CVS: S1S2 , regular , No M/R/G appreciated  PULM: CTA B/L,  no W/R/R appreciated  ABD.: soft. non tender, non distended,  bowel sounds present peg noted   Extrem: intact pulses , decreased edema left foot in brace   Derm: No rash , no ecchymoses large  sacral decubitus noted  PSYCH : normal mood,  no delusion not anxious     ---___---___---___---___---___---            LAB AND IMAGIN.1    29.7  )-----------( 375      ( 2019 16:23 )             23.1               07-20    138  |  97  |  126<H>  ----------------------------<  128<H>  4.2   |  19<L>  |  3.37<H>    Ca    8.4      2019 10:47                            ABG - ( 2019 00:03 )  pH, Arterial: 7.43  pH, Blood: x     /  pCO2: 30    /  pO2: 152   / HCO3: 19    / Base Excess: -4.0  /  SaO2: 98                    [All pertinent / recent Imaging reviewed]         ---___---___---___---___---___---___ ---___---___---___---___---                         A S S E S S M E N T   A N D   P L A N :      HEALTH ISSUES -   libia on lasix drip and decreased feeds   leukocytosis monitor cbc id aware   chronic pain on multiple medication including marijuana   tachycardia on lopressor   chf cardiology following   ashtma on budesoinde   dm2 on insulin   functional quadraplegia supportive care   -GI/DVT Prophylaxis. continue eloquis  anemia on ferrous sulfate  agitation on seroquel and klonopn       overall prognosis poor     --------------------------------------------  Case discussed with   Education given on   ___________________________  Thank you,  Deniz Bolden  [phone]

## 2019-07-20 NOTE — CHART NOTE - NSCHARTNOTEFT_GEN_A_CORE
called by  RN to state Hb 6.9  form this morning AM lab which is resulted now . Pt's cbc was drawn at  4 pm in stat lab which resulted  Hb 8.1  .  No bleeding noted . will continue to  follow .   Lazarus Pierre NP-C [id number]

## 2019-07-20 NOTE — PROGRESS NOTE ADULT - SUBJECTIVE AND OBJECTIVE BOX
PULMONARY PROGRESS NOTE    MYRIAM HEATH  MRN-[id number]    Patient is a 69y old  Female who presents with a chief complaint of PEG tube dislodgement, fever, leukocytosis (20 Jul 2019 10:12)      HPI:  -  -    ROS:   -    ACTIVE MEDICATION LIST:  MEDICATIONS  (STANDING):  apixaban 2.5 milliGRAM(s) Oral two times a day  artificial tears (preservative free) Ophthalmic Solution 1 Drop(s) Both EYES four times a day  ascorbic acid 500 milliGRAM(s) Oral daily  BACItracin   Ointment 1 Application(s) Topical two times a day  chlorhexidine 2% Cloths 1 Application(s) Topical daily  clonazePAM  Tablet 1 milliGRAM(s) Oral at bedtime  Dakins Solution - 1/4 Strength 1 Application(s) Topical two times a day  dextrose 5%. 1000 milliLiter(s) (50 mL/Hr) IV Continuous <Continuous>  famotidine    Tablet 20 milliGRAM(s) Oral daily  ferrous    sulfate Liquid 300 milliGRAM(s) Enteral Tube daily  formoterol for nebulization 20 MICROGram(s) Nebulizer two times a day  furosemide Infusion 5 mG/Hr (2.5 mL/Hr) IV Continuous <Continuous>  gabapentin   Solution 300 milliGRAM(s) Oral every 12 hours  hydrocortisone sodium succinate Injectable 25 milliGRAM(s) IV Push every 12 hours  insulin lispro (HumaLOG) corrective regimen sliding scale   SubCutaneous every 6 hours  levothyroxine Injectable 60 MICROGram(s) IV Push at bedtime  Medical Marijuana Awake Oil 2 milliLiter(s),Medical Marijuana Awake Oil 2 milliLiter(s) 2 milliLiter(s) Enteral Tube <User Schedule>  Medical Marijuana Calm Oil 2 milliLiter(s),Medical [place] Calm Oil 2 milliLiter(s) 2 milliLiter(s) Enteral Tube <User Schedule>  Medical Marijuana [place] Oil 2 milliLiter(s),Medical Marijuana [place] Oil 2 milliLiter(s) 2 milliLiter(s) Enteral Tube <User Schedule>  meropenem  IVPB 500 milliGRAM(s) IV Intermittent every 24 hours  metoprolol tartrate 12.5 milliGRAM(s) Oral two times a day  multivitamin/minerals/iron Oral Solution (CENTRUM) 15 milliLiter(s) Oral daily  QUEtiapine 25 milliGRAM(s) Oral at bedtime  sodium chloride 0.9%. 1000 milliLiter(s) (75 mL/Hr) IV Continuous <Continuous>  tiZANidine 4 milliGRAM(s) Oral <User Schedule>  zinc sulfate 220 milliGRAM(s) Oral daily    MEDICATIONS  (PRN):  acetaminophen  Suppository .. 650 milliGRAM(s) Rectal every 6 hours PRN Temp greater or equal to 38C (100.4F), Mild Pain (1 - 3)  carboxymethylcellulose 0.5% (Preservative-Free) Ophthalmic Solution 1 Drop(s) Both EYES three times a day PRN dry eyes  glucagon  Injectable 1 milliGRAM(s) IntraMuscular once PRN Glucose LESS THAN 70 milligrams/deciliter  nystatin Powder 1 Application(s) Topical three times a day PRN under breasts      EXAM:  Vital Signs Last 24 Hrs  T(C): 36.9 (20 Jul 2019 09:00), Max: 37.2 (19 Jul 2019 18:56)  T(F): 98.4 (20 Jul 2019 09:00), Max: 99 (19 Jul 2019 18:56)  HR: 101 (20 Jul 2019 09:00) (101 - 148)  BP: 116/71 (20 Jul 2019 09:00) (112/68 - 145/93)  BP(mean): --  RR: 18 (20 Jul 2019 09:00) (16 - 18)  SpO2: 99% (20 Jul 2019 09:00) (96% - 100%)    GENERAL: The patient is awake and alert in no apparent distress.     LUNGS: Clear to auscultation without wheezing, rales or rhonchi; respirations unlabored    HEART: Regular rate and rhythm without murmur.                            7.5    23.0  )-----------( 350      ( 20 Jul 2019 00:09 )             20.9       07-19    134<L>  |  94<L>  |  123<H>  ----------------------------<  138<H>  3.8   |  19<L>  |  3.35<H>    Ca    8.6      19 Jul 2019 06:06          PROBLEM LIST:  69y Female with HEALTH ISSUES - PROBLEM Dx:  Fever: Fever  Type 2 diabetes mellitus without complication, without long-term current use of insulin:   Make sure you get your HgA1c checked every three months.  If you take oral diabetes medications, check your blood glucose two times a day.  If you take insulin, check your blood glucose before meals and at bedtime.  It&#x27;s important not to skip any meals.  Keep a log of your blood glucose results and always take it with you to your doctor appointments.  Keep a list of your current medications including injectables and over the counter medications and bring this medication list with you to all your doctor appointments.  If you have not seen your ophthalmologist this year call for appointment.  Check your feet daily for redness, sores, or openings. Do not self treat. If no improvement in two days call your primary care physician for an appointment.  Low blood sugar (hypoglycemia) is a blood sugar below 70mg/dl. Check your blood sugar if you feel signs/symptoms of hypoglycemia. If your blood sugar is below 70 take 15 grams of carbohydrates (ex 4 oz of apple juice, 3-4 glucose tablets, or 4-6 oz of regular soda) wait 15 minutes and repeat blood sugar to make sure it comes up above 70.  If your blood sugar is above 70 and you are due for a meal, have a meal.  If you are not due for a meal have a snack.  This snack helps keeps your blood sugar at a safe range.    Pressure injury of skin of sacral region, unspecified injury stage: Continue with wound care as instructed.   Maintain adequate nutrition, frequent repositioning , offloading with Zfloat boots.  Follow-up with your primary care physician and Wound Care Specialist within 1 week. Call for appointment.    Chronic deep vein thrombosis (DVT) of right lower extremity, unspecified vein: Take your &quot;blood thinners&quot; as prescribed.  Walking is encouraged, increase activity as tolerated.  If you develop new leg pain, swelling, and/or redness contact your healthcare provider.  If you develop new chest pain with difficulty breathing, a rapid heart rate and/or a feeling of passing out call emergency medical services 911.    Prophylactic measure: Prophylactic measure  Dementia without behavioral disturbance, unspecified dementia type: Continue with medications as prescribed by your doctor.  Maintain safe environment.  Maintain adequate nutrition, hydration.  Follow-up with your primary care physician within 1 week. Call for appointment.    Uncomplicated asthma, unspecified asthma severity, unspecified whether persistent: Stable.  Follow-up with your primary care physician within 1 week. Call for appointment.    Sepsis, due to unspecified organism: Take all antibiotics as ordered.  Call you Health care provider upon arrival home to make a one week follow up appointment.  If you develop fever, chills, malaise, or change in mental status call your Health Care Provider or go to the Emergency Department.  Nutrition is important, eat small frequent meals to help ensure you get adequate calories.  Do not stay in bed all day!  Increase your activity daily as tolerated.    PEG tube malfunction: PEG tube malfunction            RECS:        Please call with any questions.    Leigh Ann Resendez DO  [facility] Pulmonary/Sleep Medicine  [phone] PULMONARY PROGRESS NOTE    MYRIAM HEATH  MRN-[id number]    Patient is a 69y old  Female who presents with a chief complaint of PEG tube dislodgement, fever, leukocytosis (20 Jul 2019 10:12)      HPI:  tachycardic overnight  now on tele monitor  remains on 2L NC    ROS:   -    ACTIVE MEDICATION LIST:  MEDICATIONS  (STANDING):  apixaban 2.5 milliGRAM(s) Oral two times a day  artificial tears (preservative free) Ophthalmic Solution 1 Drop(s) Both EYES four times a day  ascorbic acid 500 milliGRAM(s) Oral daily  BACItracin   Ointment 1 Application(s) Topical two times a day  chlorhexidine 2% Cloths 1 Application(s) Topical daily  clonazePAM  Tablet 1 milliGRAM(s) Oral at bedtime  Dakins Solution - 1/4 Strength 1 Application(s) Topical two times a day  dextrose 5%. 1000 milliLiter(s) (50 mL/Hr) IV Continuous <Continuous>  famotidine    Tablet 20 milliGRAM(s) Oral daily  ferrous    sulfate Liquid 300 milliGRAM(s) Enteral Tube daily  formoterol for nebulization 20 MICROGram(s) Nebulizer two times a day  furosemide Infusion 5 mG/Hr (2.5 mL/Hr) IV Continuous <Continuous>  gabapentin   Solution 300 milliGRAM(s) Oral every 12 hours  hydrocortisone sodium succinate Injectable 25 milliGRAM(s) IV Push every 12 hours  insulin lispro (HumaLOG) corrective regimen sliding scale   SubCutaneous every 6 hours  levothyroxine Injectable 60 MICROGram(s) IV Push at bedtime  Medical Marijuana Awake Oil 2 milliLiter(s),Medical Marijuana Awake Oil 2 milliLiter(s) 2 milliLiter(s) Enteral Tube <User Schedule>  Medical Marijuana Calm Oil 2 milliLiter(s),Medical Marinjuana Calm Oil 2 milliLiter(s) 2 milliLiter(s) Enteral Tube <User Schedule>  Medical Marijuana [place] Oil 2 milliLiter(s),Medical Marijuana [place] Oil 2 milliLiter(s) 2 milliLiter(s) Enteral Tube <User Schedule>  meropenem  IVPB 500 milliGRAM(s) IV Intermittent every 24 hours  metoprolol tartrate 12.5 milliGRAM(s) Oral two times a day  multivitamin/minerals/iron Oral Solution (CENTRUM) 15 milliLiter(s) Oral daily  QUEtiapine 25 milliGRAM(s) Oral at bedtime  sodium chloride 0.9%. 1000 milliLiter(s) (75 mL/Hr) IV Continuous <Continuous>  tiZANidine 4 milliGRAM(s) Oral <User Schedule>  zinc sulfate 220 milliGRAM(s) Oral daily    MEDICATIONS  (PRN):  acetaminophen  Suppository .. 650 milliGRAM(s) Rectal every 6 hours PRN Temp greater or equal to 38C (100.4F), Mild Pain (1 - 3)  carboxymethylcellulose 0.5% (Preservative-Free) Ophthalmic Solution 1 Drop(s) Both EYES three times a day PRN dry eyes  glucagon  Injectable 1 milliGRAM(s) IntraMuscular once PRN Glucose LESS THAN 70 milligrams/deciliter  nystatin Powder 1 Application(s) Topical three times a day PRN under breasts      EXAM:  Vital Signs Last 24 Hrs  T(C): 36.9 (20 Jul 2019 09:00), Max: 37.2 (19 Jul 2019 18:56)  T(F): 98.4 (20 Jul 2019 09:00), Max: 99 (19 Jul 2019 18:56)  HR: 101 (20 Jul 2019 09:00) (101 - 148)  BP: 116/71 (20 Jul 2019 09:00) (112/68 - 145/93)  BP(mean): --  RR: 18 (20 Jul 2019 09:00) (16 - 18)  SpO2: 99% (20 Jul 2019 09:00) (96% - 100%)    GENERAL: The patient is  lethargic    LUNGS poor effort respirations unlabored    HEART: Regular rate and rhythm without murmur.                            7.5    23.0  )-----------( 350      ( 20 Jul 2019 00:09 )             20.9       07-19    134<L>  |  94<L>  |  123<H>  ----------------------------<  138<H>  3.8   |  19<L>  |  3.35<H>    Ca    8.6      19 Jul 2019 06:06          PROBLEM LIST:  69y Female with HEALTH ISSUES - PROBLEM Dx:  Fever: Fever  Type 2 diabetes mellitus without complication, without long-term current use of insulin:   Make sure you get your HgA1c checked every three months.  If you take oral diabetes medications, check your blood glucose two times a day.  If you take insulin, check your blood glucose before meals and at bedtime.  It&#x27;s important not to skip any meals.  Keep a log of your blood glucose results and always take it with you to your doctor appointments.  Keep a list of your current medications including injectables and over the counter medications and bring this medication list with you to all your doctor appointments.  If you have not seen your ophthalmologist this year call for appointment.  Check your feet daily for redness, sores, or openings. Do not self treat. If no improvement in two days call your primary care physician for an appointment.  Low blood sugar (hypoglycemia) is a blood sugar below 70mg/dl. Check your blood sugar if you feel signs/symptoms of hypoglycemia. If your blood sugar is below 70 take 15 grams of carbohydrates (ex 4 oz of apple juice, 3-4 glucose tablets, or 4-6 oz of regular soda) wait 15 minutes and repeat blood sugar to make sure it comes up above 70.  If your blood sugar is above 70 and you are due for a meal, have a meal.  If you are not due for a meal have a snack.  This snack helps keeps your blood sugar at a safe range.    Pressure injury of skin of sacral region, unspecified injury stage: Continue with wound care as instructed.   Maintain adequate nutrition, frequent repositioning , offloading with Zfloat boots.  Follow-up with your primary care physician and Wound Care Specialist within 1 week. Call for appointment.    Chronic deep vein thrombosis (DVT) of right lower extremity, unspecified vein: Take your &quot;blood thinners&quot; as prescribed.  Walking is encouraged, increase activity as tolerated.  If you develop new leg pain, swelling, and/or redness contact your healthcare provider.  If you develop new chest pain with difficulty breathing, a rapid heart rate and/or a feeling of passing out call emergency medical services 911.    Prophylactic measure: Prophylactic measure  Dementia without behavioral disturbance, unspecified dementia type: Continue with medications as prescribed by your doctor.  Maintain safe environment.  Maintain adequate nutrition, hydration.  Follow-up with your primary care physician within 1 week. Call for appointment.    Uncomplicated asthma, unspecified asthma severity, unspecified whether persistent: Stable.  Follow-up with your primary care physician within 1 week. Call for appointment.    Sepsis, due to unspecified organism: Take all antibiotics as ordered.  Call you Health care provider upon arrival home to make a one week follow up appointment.  If you develop fever, chills, malaise, or change in mental status call your Health Care Provider or go to the Emergency Department.  Nutrition is important, eat small frequent meals to help ensure you get adequate calories.  Do not stay in bed all day!  Increase your activity daily as tolerated.    PEG tube malfunction: PEG tube malfunction            RECS:    restart chest PT  nebs around the clock  abx per ID  aspiration precautions   02 for comfort        Please call with any questions.    Leigh Ann Resendez DO  [facility] Pulmonary/Sleep Medicine  [phone]

## 2019-07-21 LAB
ANION GAP SERPL CALC-SCNC: 20 MMOL/L — HIGH (ref 5–17)
BUN SERPL-MCNC: 124 MG/DL — HIGH (ref 7–23)
CALCIUM SERPL-MCNC: 8.5 MG/DL — SIGNIFICANT CHANGE UP (ref 8.4–10.5)
CHLORIDE SERPL-SCNC: 97 MMOL/L — SIGNIFICANT CHANGE UP (ref 96–108)
CO2 SERPL-SCNC: 19 MMOL/L — LOW (ref 22–31)
CREAT SERPL-MCNC: 3.41 MG/DL — HIGH (ref 0.5–1.3)
GLUCOSE BLDC GLUCOMTR-MCNC: 112 MG/DL — HIGH (ref 70–99)
GLUCOSE BLDC GLUCOMTR-MCNC: 113 MG/DL — HIGH (ref 70–99)
GLUCOSE BLDC GLUCOMTR-MCNC: 127 MG/DL — HIGH (ref 70–99)
GLUCOSE BLDC GLUCOMTR-MCNC: 151 MG/DL — HIGH (ref 70–99)
GLUCOSE BLDC GLUCOMTR-MCNC: 157 MG/DL — HIGH (ref 70–99)
GLUCOSE SERPL-MCNC: 112 MG/DL — HIGH (ref 70–99)
HCT VFR BLD CALC: 22.6 % — LOW (ref 34.5–45)
HGB BLD-MCNC: 7.1 G/DL — LOW (ref 11.5–15.5)
MCHC RBC-ENTMCNC: 29 PG — SIGNIFICANT CHANGE UP (ref 27–34)
MCHC RBC-ENTMCNC: 31.4 GM/DL — LOW (ref 32–36)
MCV RBC AUTO: 92.2 FL — SIGNIFICANT CHANGE UP (ref 80–100)
PLATELET # BLD AUTO: 376 K/UL — SIGNIFICANT CHANGE UP (ref 150–400)
POTASSIUM SERPL-MCNC: 3.7 MMOL/L — SIGNIFICANT CHANGE UP (ref 3.5–5.3)
POTASSIUM SERPL-SCNC: 3.7 MMOL/L — SIGNIFICANT CHANGE UP (ref 3.5–5.3)
RBC # BLD: 2.45 M/UL — LOW (ref 3.8–5.2)
RBC # FLD: 15.1 % — HIGH (ref 10.3–14.5)
SODIUM SERPL-SCNC: 136 MMOL/L — SIGNIFICANT CHANGE UP (ref 135–145)
WBC # BLD: 20.84 K/UL — HIGH (ref 3.8–10.5)
WBC # FLD AUTO: 20.84 K/UL — HIGH (ref 3.8–10.5)

## 2019-07-21 PROCEDURE — 99233 SBSQ HOSP IP/OBS HIGH 50: CPT

## 2019-07-21 PROCEDURE — 73560 X-RAY EXAM OF KNEE 1 OR 2: CPT | Mod: 26,LT

## 2019-07-21 PROCEDURE — 71045 X-RAY EXAM CHEST 1 VIEW: CPT | Mod: 26

## 2019-07-21 PROCEDURE — 71045 X-RAY EXAM CHEST 1 VIEW: CPT | Mod: 26,77

## 2019-07-21 RX ORDER — CLONAZEPAM 1 MG
1 TABLET ORAL AT BEDTIME
Refills: 0 | Status: DISCONTINUED | OUTPATIENT
Start: 2019-07-21 | End: 2019-07-28

## 2019-07-21 RX ADMIN — ZINC SULFATE TAB 220 MG (50 MG ZINC EQUIVALENT) 220 MILLIGRAM(S): 220 (50 ZN) TAB at 12:28

## 2019-07-21 RX ADMIN — QUETIAPINE FUMARATE 25 MILLIGRAM(S): 200 TABLET, FILM COATED ORAL at 22:08

## 2019-07-21 RX ADMIN — APIXABAN 2.5 MILLIGRAM(S): 2.5 TABLET, FILM COATED ORAL at 06:37

## 2019-07-21 RX ADMIN — Medication 25 MILLIGRAM(S): at 17:17

## 2019-07-21 RX ADMIN — Medication 60 MICROGRAM(S): at 22:09

## 2019-07-21 RX ADMIN — CHLORHEXIDINE GLUCONATE 1 APPLICATION(S): 213 SOLUTION TOPICAL at 12:39

## 2019-07-21 RX ADMIN — Medication 12.5 MILLIGRAM(S): at 09:15

## 2019-07-21 RX ADMIN — Medication 1 DROP(S): at 12:29

## 2019-07-21 RX ADMIN — Medication 1 MILLIGRAM(S): at 22:10

## 2019-07-21 RX ADMIN — Medication 20 MICROGRAM(S): at 17:17

## 2019-07-21 RX ADMIN — Medication 1 DROP(S): at 06:37

## 2019-07-21 RX ADMIN — Medication 1: at 18:09

## 2019-07-21 RX ADMIN — FAMOTIDINE 20 MILLIGRAM(S): 10 INJECTION INTRAVENOUS at 12:28

## 2019-07-21 RX ADMIN — Medication 20 MICROGRAM(S): at 06:40

## 2019-07-21 RX ADMIN — APIXABAN 2.5 MILLIGRAM(S): 2.5 TABLET, FILM COATED ORAL at 17:17

## 2019-07-21 RX ADMIN — Medication 25 MILLIGRAM(S): at 06:40

## 2019-07-21 RX ADMIN — Medication 1 APPLICATION(S): at 17:18

## 2019-07-21 RX ADMIN — Medication 1 APPLICATION(S): at 06:38

## 2019-07-21 RX ADMIN — Medication 15 MILLILITER(S): at 12:28

## 2019-07-21 RX ADMIN — GABAPENTIN 300 MILLIGRAM(S): 400 CAPSULE ORAL at 09:15

## 2019-07-21 RX ADMIN — Medication 1 APPLICATION(S): at 06:47

## 2019-07-21 RX ADMIN — Medication 12.5 MILLIGRAM(S): at 22:08

## 2019-07-21 RX ADMIN — Medication 500 MILLIGRAM(S): at 12:28

## 2019-07-21 RX ADMIN — MEROPENEM 100 MILLIGRAM(S): 1 INJECTION INTRAVENOUS at 06:45

## 2019-07-21 RX ADMIN — TIZANIDINE 4 MILLIGRAM(S): 4 TABLET ORAL at 22:08

## 2019-07-21 RX ADMIN — GABAPENTIN 300 MILLIGRAM(S): 400 CAPSULE ORAL at 22:23

## 2019-07-21 RX ADMIN — TIZANIDINE 4 MILLIGRAM(S): 4 TABLET ORAL at 09:15

## 2019-07-21 RX ADMIN — Medication 1 DROP(S): at 17:17

## 2019-07-21 RX ADMIN — Medication 1: at 12:28

## 2019-07-21 RX ADMIN — Medication 300 MILLIGRAM(S): at 12:28

## 2019-07-21 NOTE — PROGRESS NOTE ADULT - SUBJECTIVE AND OBJECTIVE BOX
---___---___---___---___---___---___ ---___---___---___---___---___---___---___---___---                  M E D I C A L   A T T E N D I N G   P R O G R E S S   N O T E  ---___---___---___---___---___---___ ---___---___---___---___---___---___---___---___---        ================================================    ++CHIEF COMPLAINT:   Patient is a 69y old  Female who presents with a chief complaint of PEG tube dislodgement, fever, leukocytosis (2019 13:58)  libia stll trending up . multiple comorbid conditions     ---___---___---___---___---___---  PAST MEDICAL / Surgical  HISTORY:  PAST MEDICAL & SURGICAL HISTORY:  Type 2 diabetes mellitus  Dementia  DVT, lower extremity  CVA (cerebral vascular accident)  Fatty pancreas  PNA (pneumonia)  Pulmonary HTN  IGT (impaired glucose tolerance)  Ulcerative colitis  Acid reflux  Anxiety  Depression  Mouth sores  HLD (hyperlipidemia)  Asthma  S/P percutaneous endoscopic gastrostomy (PEG) tube placement: for dysphagia  Humeral head fracture  H/O: hysterectomy  H/O cataract extraction, left  History of knee replacement      ---___---___---___---___---___---  FAMILY HISTORY:   FAMILY HISTORY:  Family history of dementia (Grandparent)  Family history of colon cancer (Grandparent)        ---___---___---___---___---___---  ALLERGIES:   Allergies    ASA; dye contrast (Anaphylaxis)  aspirin (Short breath)  divalproex sodium (Other (Unknown))  Flowers and Plants (Short breath)  Haldol (Other (Unknown))  penicillin (Short breath; Rash)  sulfa drugs (Short breath; Rash)  vancomycin (Rash; Urticaria; Hives)  Xanax (Other (Unknown))    Intolerances        ---___---___---___---___---___---  MEDICATIONS:  MEDICATIONS  (STANDING):  apixaban 2.5 milliGRAM(s) Oral two times a day  artificial tears (preservative free) Ophthalmic Solution 1 Drop(s) Both EYES four times a day  ascorbic acid 500 milliGRAM(s) Oral daily  BACItracin   Ointment 1 Application(s) Topical two times a day  chlorhexidine 2% Cloths 1 Application(s) Topical daily  clonazePAM  Tablet 1 milliGRAM(s) Oral at bedtime  Dakins Solution - 1/4 Strength 1 Application(s) Topical two times a day  dextrose 5%. 1000 milliLiter(s) (50 mL/Hr) IV Continuous <Continuous>  famotidine    Tablet 20 milliGRAM(s) Oral daily  ferrous    sulfate Liquid 300 milliGRAM(s) Enteral Tube daily  formoterol for nebulization 20 MICROGram(s) Nebulizer two times a day  furosemide Infusion 5 mG/Hr (2.5 mL/Hr) IV Continuous <Continuous>  gabapentin   Solution 300 milliGRAM(s) Oral every 12 hours  hydrocortisone sodium succinate Injectable 25 milliGRAM(s) IV Push every 12 hours  insulin lispro (HumaLOG) corrective regimen sliding scale   SubCutaneous every 6 hours  levothyroxine Injectable 60 MICROGram(s) IV Push at bedtime  Medical Marijuana Awake Oil 2 milliLiter(s),Medical Marijuana Awake Oil 2 milliLiter(s) 2 milliLiter(s) Enteral Tube <User Schedule>  Medical Marijuana Calm Oil 2 milliLiter(s),Medical [place]juana Calm Oil 2 milliLiter(s) 2 milliLiter(s) Enteral Tube <User Schedule>  Medical Marijuana [place] Oil 2 milliLiter(s),Medical Marijuana [place] Oil 2 milliLiter(s) 2 milliLiter(s) Enteral Tube <User Schedule>  meropenem  IVPB 500 milliGRAM(s) IV Intermittent every 24 hours  metoprolol tartrate 12.5 milliGRAM(s) Oral two times a day  multivitamin/minerals/iron Oral Solution (CENTRUM) 15 milliLiter(s) Oral daily  QUEtiapine 25 milliGRAM(s) Oral at bedtime  sodium chloride 0.9%. 1000 milliLiter(s) (75 mL/Hr) IV Continuous <Continuous>  tiZANidine 4 milliGRAM(s) Oral <User Schedule>  zinc sulfate 220 milliGRAM(s) Oral daily    MEDICATIONS  (PRN):  acetaminophen  Suppository .. 650 milliGRAM(s) Rectal every 6 hours PRN Temp greater or equal to 38C (100.4F), Mild Pain (1 - 3)  carboxymethylcellulose 0.5% (Preservative-Free) Ophthalmic Solution 1 Drop(s) Both EYES three times a day PRN dry eyes  glucagon  Injectable 1 milliGRAM(s) IntraMuscular once PRN Glucose LESS THAN 70 milligrams/deciliter  nystatin Powder 1 Application(s) Topical three times a day PRN under breasts      ---___---___---___---___---___---  REVIEW OF SYSTEM:    unable to obtain     ---___---___---___---___---___---  VITAL SIGNS:  69y , CAPILLARY BLOOD GLUCOSE      POCT Blood Glucose.: 151 mg/dL (2019 12:20)    T(C): 36.8 (19 @ 13:00), Max: 37.2 (19 @ 01:00)  HR: 95 (19 @ 13:00) (95 - 112)  BP: 149/87 (19 @ 13:00) (107/71 - 149/97)  RR: 18 (19 @ 13:00) (18 - 18)  SpO2: 100% (19 @ 13:00) (83% - 100%)  ---___---___---___---___---___---  PHYSICAL EXAM:    GEN: A&O X 0 , NAD , comfortable  HEENT: NCAT, PERRL, MMM, hearing intact  Neck: supple , no JVD  CVS: S1S2 , regular , No M/R/G appreciated  PULM: decreased breath sounds noted   ABD.: soft. non tender, non distended,  bowel sounds present peg noted   Extrem: intact pulses , s\edema and severe contractions noted   Derm: sacral decubitus noted         ---___---___---___---___---___---            LAB AND IMAGIN.1    .84 )-----------( 376      ( 2019 10:18 )             22.6               07-21    136  |  97  |  124<H>  ----------------------------<  112<H>  3.7   |  19<L>  |  3.41<H>    Ca    8.5      2019 07:23              cxr  Patient's chin obscures the left lung apex. Again noted is   complete opacification of the left hemithorax likely secondary to a large   left pleural effusion and/or atelectasis. There is mild pulmonary   vascular congestion.                ABG - ( 2019 00:03 )  pH, Arterial: 7.43  pH, Blood: x     /  pCO2: 30    /  pO2: 152   / HCO3: 19    / Base Excess: -4.0  /  SaO2: 98                    [All pertinent / recent Imaging reviewed]         ---___---___---___---___---___---___ ---___---___---___---___---                         A S S E S S M E N T   A N D   P L A N :      HEALTH ISSUES - PROBLEM Dx:    atelectasis unlikely it to be pneumonia on abx   will start chest vest     libia trending higher continue followig labs  on lasix iv daily  on peg feeds     left leg fracture  repeat xray     history of dvt  continue eliquis     sacral decubitus continue wound care    agitation  on seroquel and klonopin     asthma on budesonide    dm2 on insulin     chronic pain on medical marijuana  and neurontin      -GI/DVT Prophylaxis.    --------------------------------------------  Case discussed with   at length  overall prognosis poor   Education given on   ___________________________  Thank you,  Deniz Bolden  [phone]

## 2019-07-21 NOTE — RAPID RESPONSE TEAM SUMMARY - NSSITUATIONBACKGROUNDRRT_GEN_ALL_CORE
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 Transferred to MICU on  7/13-7/15 and has been hemodynamically stable after downgraded to the floors. After extensive GOC still family wants full code and on NC 3L but was on high flow and face tent in the past. Concerns of extensive DVT and was on hep gtt in the MICU but once downgraded to the floors on 7/17 transitioned to Eliquis.   RRT on 7/21 was called for hypoxia. Pulse Ox was on her nail polished fingers were found to be in the 83%. On her ears it was found to be 100% while on NRB. Downtitrated her O2 off the NRB to NC 3L which she was on before and was still at 100%. There hasn't been an increased secretions or fevers, VSS throughout the RRT. Held off repeat labs for now given her stable VS.    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 Transferred to MICU on  7/13-7/15 and has been hemodynamically stable after downgraded to the floors. After extensive GOC still family wants full code and on NC 3L but was on high flow and face tent in the past. Concerns of extensive DVT and was on hep gtt in the MICU but once downgraded to the floors on 7/17 transitioned to Eliquis.   RRT on 7/21 was called for hypoxia. Pulse Ox was on her nail polished fingers were found to be in the 83%. On her ears it was found to be 100% while on NRB. Downtitrated her O2 off the NRB to NC 3L which she was on before and was still at 100%. There hasn't been an increased secretions or fevers, VSS throughout the RRT. Held off repeat labs for now given her stable VS.    To do:  [] keep pulse ox on the ear for now and monitor O2 sats from there, can consider removing the nail polish over the fingernails  [] if spiking fevers restart broad spec abx again and get another repeat CXR and ABG    Poor prognosis overall and updated the primary team, NP Haven Hidalgo

## 2019-07-21 NOTE — CHART NOTE - NSCHARTNOTEFT_GEN_A_CORE
MEDICINE NP NOTE  Spectra [id number]      CXR done this am with complete opacification of left hemithorax.  Discussed with Dr Bolden and Dr Zimmer.  Continue current nebulizer treatments and Chest PT.  Will repeat CXR upright, if unable to perform will order CT Chest.  Continue to monitor for now.

## 2019-07-21 NOTE — CHART NOTE - NSCHARTNOTEFT_GEN_A_CORE
MEDICINE NP NOTE  Spectra [phone]      Patient s/p rapid response for hypoxia, 83% pulse oximetry reading on finger (patient has heavy coat of nail polish).  When RRT team checked pulse oximetry on ear lobe it was 100%.  Patient seen and examined post RRT.  Family member at bedside.  Patient laying in bed, appears comfortable, opens her eyes to tactile stimuli and sating 100% on oxygen.  Pulse ox readings to be checked on ear lobe only.  Will continue to monitor.   Dr Bolden notified.

## 2019-07-21 NOTE — PROGRESS NOTE ADULT - SUBJECTIVE AND OBJECTIVE BOX
Patient seen and examined in bed; RRT this AM due to hypoxia; on 02 now; 98%;     REVIEW OF SYSTEMS:  UTO; non-communicative    MEDICATIONS  (STANDING):  apixaban 2.5 milliGRAM(s) Oral two times a day  artificial tears (preservative free) Ophthalmic Solution 1 Drop(s) Both EYES four times a day  ascorbic acid 500 milliGRAM(s) Oral daily  BACItracin   Ointment 1 Application(s) Topical two times a day  chlorhexidine 2% Cloths 1 Application(s) Topical daily  clonazePAM  Tablet 1 milliGRAM(s) Oral at bedtime  Dakins Solution - 1/4 Strength 1 Application(s) Topical two times a day  dextrose 5%. 1000 milliLiter(s) (50 mL/Hr) IV Continuous <Continuous>  famotidine    Tablet 20 milliGRAM(s) Oral daily  ferrous    sulfate Liquid 300 milliGRAM(s) Enteral Tube daily  formoterol for nebulization 20 MICROGram(s) Nebulizer two times a day  furosemide Infusion 5 mG/Hr (2.5 mL/Hr) IV Continuous <Continuous>  gabapentin   Solution 300 milliGRAM(s) Oral every 12 hours  hydrocortisone sodium succinate Injectable 25 milliGRAM(s) IV Push every 12 hours  insulin lispro (HumaLOG) corrective regimen sliding scale   SubCutaneous every 6 hours  levothyroxine Injectable 60 MICROGram(s) IV Push at bedtime  Medical Marijuana Awake Oil 2 milliLiter(s),Medical Marijuana Awake Oil 2 milliLiter(s) 2 milliLiter(s) Enteral Tube <User Schedule>  Medical Marijuana Calm Oil 2 milliLiter(s),Medical [place] Calm Oil 2 milliLiter(s) 2 milliLiter(s) Enteral Tube <User Schedule>  Medical Marijuana [place] Oil 2 milliLiter(s),Medical Marijuana [place] Oil 2 milliLiter(s) 2 milliLiter(s) Enteral Tube <User Schedule>  meropenem  IVPB 500 milliGRAM(s) IV Intermittent every 24 hours  metoprolol tartrate 12.5 milliGRAM(s) Oral two times a day  multivitamin/minerals/iron Oral Solution (CENTRUM) 15 milliLiter(s) Oral daily  QUEtiapine 25 milliGRAM(s) Oral at bedtime  sodium chloride 0.9%. 1000 milliLiter(s) (75 mL/Hr) IV Continuous <Continuous>  tiZANidine 4 milliGRAM(s) Oral <User Schedule>  zinc sulfate 220 milliGRAM(s) Oral daily      VITAL:  T(C): , Max: 37.2 (07-21-19 @ 01:00)  T(F): , Max: 99 (07-21-19 @ 01:00)  HR: 95 (07-21-19 @ 13:00)  BP: 149/87 (07-21-19 @ 13:00)  BP(mean): --  RR: 18 (07-21-19 @ 13:00)  SpO2: 100% (07-21-19 @ 13:00)  Wt(kg): --    I and O's:    07-20 @ 07:01  -  07-21 @ 07:00  --------------------------------------------------------  IN: 402.5 mL / OUT: 790 mL / NET: -387.5 mL    07-21 @ 07:01  -  07-21 @ 13:59  --------------------------------------------------------  IN: 215 mL / OUT: 150 mL / NET: 65 mL          PHYSICAL EXAM:    Constitutional: NAD  HEENT: PERRLA, EOMI,  MMM  Neck: No LAD, + JVD  Respiratory: diminished b/l  Cardiovascular: S1 and S2  Gastrointestinal: BS+, soft, NT/ND  Extremities: No peripheral edema  Neurological: RUBEN  : + Gavin  Skin: No rashes  Access: Not applicable    LABS:                        7.1    20.84 )-----------( 376      ( 21 Jul 2019 10:18 )             22.6     07-21    136  |  97  |  124<H>  ----------------------------<  112<H>  3.7   |  19<L>  |  3.41<H>    Ca    8.5      21 Jul 2019 07:23      Assessment	  69 year old female with multiple prolonged hospitalizations, known to Dr Francis from our office, with PMH of Advanced dementia (nonverbal, dysphagia, with PEG tube, functional quadriplegic, chronic Gavin catheter) sacral state 4 pressure ulcer, heel pressure ulcers, asthma on chronic prednisone, HLD, CVA, s is, 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 since admission, went to IR to have PEG replaced.  TRANG and hyperkalemia.    1. TRANG likely multifactorial with Creatinine rising slightly this AM  2. Hyponatremia from TRANG; stable   3. Hypercalcemia, stable; stable   4. Anemia; Hgb low; initial Hgb <7; repeat slightly better though <goal   5. Volume overloaded; remains on Bumex gtt; /24hours;  has expressed to Dr. Ramos that he is not interested in HD at this time     RECOMMEND:  - BMP daily   - No objection to continue Lasix @ 2.5 mg/hr for now  - Maintain gavin catheter for now  - Strict I & Os  - TF management per primary team; continue TF rate of 25cc/hour   - Continue to trend H/H; suggest PRBC for Hgb < 7.0; transfuse per primary team

## 2019-07-21 NOTE — PROGRESS NOTE ADULT - SUBJECTIVE AND OBJECTIVE BOX
PULMONARY PROGRESS NOTE    MYRIAM HEATH  MRN-[id number]    Patient is a 69y old  Female who presents with a chief complaint of PEG tube dislodgement, fever, leukocytosis (20 Jul 2019 17:53)      HPI: more comfortable today  -  --    ROS: more responsive overall  moving bowels  some leakage at peg site  -  -    ACTIVE MEDICATION LIST:  MEDICATIONS  (STANDING):  apixaban 2.5 milliGRAM(s) Oral two times a day  artificial tears (preservative free) Ophthalmic Solution 1 Drop(s) Both EYES four times a day  ascorbic acid 500 milliGRAM(s) Oral daily  BACItracin   Ointment 1 Application(s) Topical two times a day  chlorhexidine 2% Cloths 1 Application(s) Topical daily  clonazePAM  Tablet 1 milliGRAM(s) Oral at bedtime  Dakins Solution - 1/4 Strength 1 Application(s) Topical two times a day  dextrose 5%. 1000 milliLiter(s) (50 mL/Hr) IV Continuous <Continuous>  famotidine    Tablet 20 milliGRAM(s) Oral daily  ferrous    sulfate Liquid 300 milliGRAM(s) Enteral Tube daily  formoterol for nebulization 20 MICROGram(s) Nebulizer two times a day  furosemide Infusion 5 mG/Hr (2.5 mL/Hr) IV Continuous <Continuous>  gabapentin   Solution 300 milliGRAM(s) Oral every 12 hours  hydrocortisone sodium succinate Injectable 25 milliGRAM(s) IV Push every 12 hours  insulin lispro (HumaLOG) corrective regimen sliding scale   SubCutaneous every 6 hours  levothyroxine Injectable 60 MICROGram(s) IV Push at bedtime  Medical Marijuana Awake Oil 2 milliLiter(s),Medical Marijuana Awake Oil 2 milliLiter(s) 2 milliLiter(s) Enteral Tube <User Schedule>  Medical Marijuana Calm Oil 2 milliLiter(s),Medical [place]ju[place] Calm Oil 2 milliLiter(s) 2 milliLiter(s) Enteral Tube <User Schedule>  Medical Marijuana [place] Oil 2 milliLiter(s),Medical Marijuana [place] Oil 2 milliLiter(s) 2 milliLiter(s) Enteral Tube <User Schedule>  meropenem  IVPB 500 milliGRAM(s) IV Intermittent every 24 hours  metoprolol tartrate 12.5 milliGRAM(s) Oral two times a day  multivitamin/minerals/iron Oral Solution (CENTRUM) 15 milliLiter(s) Oral daily  QUEtiapine 25 milliGRAM(s) Oral at bedtime  sodium chloride 0.9%. 1000 milliLiter(s) (75 mL/Hr) IV Continuous <Continuous>  tiZANidine 4 milliGRAM(s) Oral <User Schedule>  zinc sulfate 220 milliGRAM(s) Oral daily    MEDICATIONS  (PRN):  acetaminophen  Suppository .. 650 milliGRAM(s) Rectal every 6 hours PRN Temp greater or equal to 38C (100.4F), Mild Pain (1 - 3)  carboxymethylcellulose 0.5% (Preservative-Free) Ophthalmic Solution 1 Drop(s) Both EYES three times a day PRN dry eyes  glucagon  Injectable 1 milliGRAM(s) IntraMuscular once PRN Glucose LESS THAN 70 milligrams/deciliter  nystatin Powder 1 Application(s) Topical three times a day PRN under breasts      EXAM:  Vital Signs Last 24 Hrs  T(C): 36.6 (21 Jul 2019 10:00), Max: 37.2 (21 Jul 2019 01:00)  T(F): 97.8 (21 Jul 2019 10:00), Max: 99 (21 Jul 2019 01:00)  HR: 102 (21 Jul 2019 10:00) (98 - 112)  BP: 141/81 (21 Jul 2019 10:00) (107/71 - 149/97)  BP(mean): --  RR: 18 (21 Jul 2019 10:00) (18 - 18)  SpO2: 98% (21 Jul 2019 10:00) (83% - 100%)    GENERAL: The patient is awake and alert in no apparent distress. Chronicalli ill apprearing    SKIN: Warm, dry, no rashes    LUNGS: Clear to auscultation without wheezing, rales or rhonchi; respirations unlabored    HEART: Regular rate and rhythm without murmur.    ABDOMEN: +BS, Soft, Nontender    EXTREMITIES: No clubbing, cyanosis, edema    ABG - ( 20 Jul 2019 00:03 )  pH, Arterial: 7.43  pH, Blood: x     /  pCO2: 30    /  pO2: 152   / HCO3: 19    / Base Excess: -4.0  /  SaO2: 98                                        7.1    20.84 )-----------( 376      ( 21 Jul 2019 10:18 )             22.6       07-21    136  |  97  |  124<H>  ----------------------------<  112<H>  3.7   |  19<L>  |  3.41<H>    Ca    8.5      21 Jul 2019 07:23                    PROBLEM LIST:  69y Female with HEALTH ISSUES - PROBLEM Dx:  Fever: Fever  Type 2 diabetes mellitus without complication, without long-term current use of insulin:   Make sure you get your HgA1c checked every three months.  If you take oral diabetes medications, check your blood glucose two times a day.  If you take insulin, check your blood glucose before meals and at bedtime.  It&#x27;s important not to skip any meals.  Keep a log of your blood glucose results and always take it with you to your doctor appointments.  Keep a list of your current medications including injectables and over the counter medications and bring this medication list with you to all your doctor appointments.  If you have not seen your ophthalmologist this year call for appointment.  Check your feet daily for redness, sores, or openings. Do not self treat. If no improvement in two days call your primary care physician for an appointment.  Low blood sugar (hypoglycemia) is a blood sugar below 70mg/dl. Check your blood sugar if you feel signs/symptoms of hypoglycemia. If your blood sugar is below 70 take 15 grams of carbohydrates (ex 4 oz of apple juice, 3-4 glucose tablets, or 4-6 oz of regular soda) wait 15 minutes and repeat blood sugar to make sure it comes up above 70.  If your blood sugar is above 70 and you are due for a meal, have a meal.  If you are not due for a meal have a snack.  This snack helps keeps your blood sugar at a safe range.    Pressure injury of skin of sacral region, unspecified injury stage: Continue with wound care as instructed.   Maintain adequate nutrition, frequent repositioning , offloading with Zfloat boots.  Follow-up with your primary care physician and Wound Care Specialist within 1 week. Call for appointment.    Chronic deep vein thrombosis (DVT) of right lower extremity, unspecified vein: Take your &quot;blood thinners&quot; as prescribed.  Walking is encouraged, increase activity as tolerated.  If you develop new leg pain, swelling, and/or redness contact your healthcare provider.  If you develop new chest pain with difficulty breathing, a rapid heart rate and/or a feeling of passing out call emergency medical services 911.    Prophylactic measure: Prophylactic measure  Dementia without behavioral disturbance, unspecified dementia type: Continue with medications as prescribed by your doctor.  Maintain safe environment.  Maintain adequate nutrition, hydration.  Follow-up with your primary care physician within 1 week. Call for appointment.    Uncomplicated asthma, unspecified asthma severity, unspecified whether persistent: Stable.  Follow-up with your primary care physician within 1 week. Call for appointment.    Sepsis, due to unspecified organism: Take all antibiotics as ordered.  Call you Health care provider upon arrival home to make a one week follow up appointment.  If you develop fever, chills, malaise, or change in mental status call your Health Care Provider or go to the Emergency Department.  Nutrition is important, eat small frequent meals to help ensure you get adequate calories.  Do not stay in bed all day!  Increase your activity daily as tolerated.    PEG tube malfunction: PEG tube malfunction            RECS: continue current rx  monitor labs  repeat CXR        Javier Zimmer MD  [phone]

## 2019-07-22 LAB
ALBUMIN SERPL ELPH-MCNC: 1.9 G/DL — LOW (ref 3.3–5)
ALP SERPL-CCNC: 72 U/L — SIGNIFICANT CHANGE UP (ref 40–120)
ALT FLD-CCNC: 7 U/L — LOW (ref 10–45)
ANION GAP SERPL CALC-SCNC: 23 MMOL/L — HIGH (ref 5–17)
APTT BLD: 37.9 SEC — HIGH (ref 27.5–36.3)
AST SERPL-CCNC: 11 U/L — SIGNIFICANT CHANGE UP (ref 10–40)
BILIRUB SERPL-MCNC: 0.2 MG/DL — SIGNIFICANT CHANGE UP (ref 0.2–1.2)
BLD GP AB SCN SERPL QL: NEGATIVE — SIGNIFICANT CHANGE UP
BUN SERPL-MCNC: 128 MG/DL — HIGH (ref 7–23)
CALCIUM SERPL-MCNC: 7.9 MG/DL — LOW (ref 8.4–10.5)
CHLORIDE SERPL-SCNC: 94 MMOL/L — LOW (ref 96–108)
CO2 SERPL-SCNC: 19 MMOL/L — LOW (ref 22–31)
CREAT SERPL-MCNC: 3.45 MG/DL — HIGH (ref 0.5–1.3)
GAS PNL BLDA: SIGNIFICANT CHANGE UP
GLUCOSE BLDC GLUCOMTR-MCNC: 109 MG/DL — HIGH (ref 70–99)
GLUCOSE BLDC GLUCOMTR-MCNC: 122 MG/DL — HIGH (ref 70–99)
GLUCOSE BLDC GLUCOMTR-MCNC: 143 MG/DL — HIGH (ref 70–99)
GLUCOSE BLDC GLUCOMTR-MCNC: 153 MG/DL — HIGH (ref 70–99)
GLUCOSE BLDC GLUCOMTR-MCNC: 154 MG/DL — HIGH (ref 70–99)
GLUCOSE SERPL-MCNC: 106 MG/DL — HIGH (ref 70–99)
HCT VFR BLD CALC: 20.2 % — CRITICAL LOW (ref 34.5–45)
HCT VFR BLD CALC: 26.6 % — LOW (ref 34.5–45)
HGB BLD-MCNC: 7.1 G/DL — LOW (ref 11.5–15.5)
HGB BLD-MCNC: 9.3 G/DL — LOW (ref 11.5–15.5)
INR BLD: 1.42 RATIO — HIGH (ref 0.88–1.16)
MAGNESIUM SERPL-MCNC: 2.1 MG/DL — SIGNIFICANT CHANGE UP (ref 1.6–2.6)
MCHC RBC-ENTMCNC: 30.6 PG — SIGNIFICANT CHANGE UP (ref 27–34)
MCHC RBC-ENTMCNC: 30.9 PG — SIGNIFICANT CHANGE UP (ref 27–34)
MCHC RBC-ENTMCNC: 35.1 GM/DL — SIGNIFICANT CHANGE UP (ref 32–36)
MCHC RBC-ENTMCNC: 35.1 GM/DL — SIGNIFICANT CHANGE UP (ref 32–36)
MCV RBC AUTO: 87.1 FL — SIGNIFICANT CHANGE UP (ref 80–100)
MCV RBC AUTO: 88 FL — SIGNIFICANT CHANGE UP (ref 80–100)
PHOSPHATE SERPL-MCNC: 7.5 MG/DL — HIGH (ref 2.5–4.5)
PLATELET # BLD AUTO: 308 K/UL — SIGNIFICANT CHANGE UP (ref 150–400)
PLATELET # BLD AUTO: 353 K/UL — SIGNIFICANT CHANGE UP (ref 150–400)
POTASSIUM SERPL-MCNC: 3.5 MMOL/L — SIGNIFICANT CHANGE UP (ref 3.5–5.3)
POTASSIUM SERPL-SCNC: 3.5 MMOL/L — SIGNIFICANT CHANGE UP (ref 3.5–5.3)
PROT SERPL-MCNC: 4.7 G/DL — LOW (ref 6–8.3)
PROTHROM AB SERPL-ACNC: 16.4 SEC — HIGH (ref 10–12.9)
RBC # BLD: 2.32 M/UL — LOW (ref 3.8–5.2)
RBC # BLD: 3.02 M/UL — LOW (ref 3.8–5.2)
RBC # FLD: 14.5 % — SIGNIFICANT CHANGE UP (ref 10.3–14.5)
RBC # FLD: 14.9 % — HIGH (ref 10.3–14.5)
RH IG SCN BLD-IMP: NEGATIVE — SIGNIFICANT CHANGE UP
SODIUM SERPL-SCNC: 136 MMOL/L — SIGNIFICANT CHANGE UP (ref 135–145)
WBC # BLD: 18.3 K/UL — HIGH (ref 3.8–10.5)
WBC # BLD: 20.6 K/UL — HIGH (ref 3.8–10.5)
WBC # FLD AUTO: 18.3 K/UL — HIGH (ref 3.8–10.5)
WBC # FLD AUTO: 20.6 K/UL — HIGH (ref 3.8–10.5)

## 2019-07-22 PROCEDURE — 99232 SBSQ HOSP IP/OBS MODERATE 35: CPT

## 2019-07-22 PROCEDURE — 99231 SBSQ HOSP IP/OBS SF/LOW 25: CPT

## 2019-07-22 RX ORDER — ACETYLCYSTEINE 200 MG/ML
5 VIAL (ML) MISCELLANEOUS THREE TIMES A DAY
Refills: 0 | Status: COMPLETED | OUTPATIENT
Start: 2019-07-22 | End: 2019-07-23

## 2019-07-22 RX ORDER — FUROSEMIDE 40 MG
40 TABLET ORAL
Refills: 0 | Status: DISCONTINUED | OUTPATIENT
Start: 2019-07-22 | End: 2019-07-22

## 2019-07-22 RX ORDER — ERYTHROPOIETIN 10000 [IU]/ML
10000 INJECTION, SOLUTION INTRAVENOUS; SUBCUTANEOUS ONCE
Refills: 0 | Status: COMPLETED | OUTPATIENT
Start: 2019-07-22 | End: 2019-07-22

## 2019-07-22 RX ORDER — HYDROCORTISONE 20 MG
25 TABLET ORAL DAILY
Refills: 0 | Status: DISCONTINUED | OUTPATIENT
Start: 2019-07-23 | End: 2019-07-25

## 2019-07-22 RX ORDER — METOPROLOL TARTRATE 50 MG
12.5 TABLET ORAL
Refills: 0 | Status: DISCONTINUED | OUTPATIENT
Start: 2019-07-22 | End: 2019-08-07

## 2019-07-22 RX ADMIN — Medication 1 APPLICATION(S): at 06:22

## 2019-07-22 RX ADMIN — APIXABAN 2.5 MILLIGRAM(S): 2.5 TABLET, FILM COATED ORAL at 06:20

## 2019-07-22 RX ADMIN — Medication 300 MILLIGRAM(S): at 11:55

## 2019-07-22 RX ADMIN — Medication 500 MILLIGRAM(S): at 11:55

## 2019-07-22 RX ADMIN — Medication 25 MILLIGRAM(S): at 09:47

## 2019-07-22 RX ADMIN — Medication 1 APPLICATION(S): at 17:44

## 2019-07-22 RX ADMIN — Medication 20 MICROGRAM(S): at 06:22

## 2019-07-22 RX ADMIN — Medication 15 MILLILITER(S): at 11:55

## 2019-07-22 RX ADMIN — Medication 1 APPLICATION(S): at 17:47

## 2019-07-22 RX ADMIN — GABAPENTIN 300 MILLIGRAM(S): 400 CAPSULE ORAL at 09:47

## 2019-07-22 RX ADMIN — ZINC SULFATE TAB 220 MG (50 MG ZINC EQUIVALENT) 220 MILLIGRAM(S): 220 (50 ZN) TAB at 11:55

## 2019-07-22 RX ADMIN — Medication 1 MILLIGRAM(S): at 23:11

## 2019-07-22 RX ADMIN — Medication 1 DROP(S): at 11:55

## 2019-07-22 RX ADMIN — Medication 1 DROP(S): at 06:21

## 2019-07-22 RX ADMIN — TIZANIDINE 4 MILLIGRAM(S): 4 TABLET ORAL at 09:47

## 2019-07-22 RX ADMIN — Medication 1 DROP(S): at 17:47

## 2019-07-22 RX ADMIN — Medication 60 MICROGRAM(S): at 23:12

## 2019-07-22 RX ADMIN — Medication 1: at 12:22

## 2019-07-22 RX ADMIN — Medication 5 MILLILITER(S): at 15:33

## 2019-07-22 RX ADMIN — ERYTHROPOIETIN 10000 UNIT(S): 10000 INJECTION, SOLUTION INTRAVENOUS; SUBCUTANEOUS at 11:55

## 2019-07-22 RX ADMIN — GABAPENTIN 300 MILLIGRAM(S): 400 CAPSULE ORAL at 22:26

## 2019-07-22 RX ADMIN — Medication 12.5 MILLIGRAM(S): at 17:47

## 2019-07-22 RX ADMIN — TIZANIDINE 4 MILLIGRAM(S): 4 TABLET ORAL at 22:26

## 2019-07-22 RX ADMIN — APIXABAN 2.5 MILLIGRAM(S): 2.5 TABLET, FILM COATED ORAL at 17:47

## 2019-07-22 RX ADMIN — NYSTATIN CREAM 1 APPLICATION(S): 100000 CREAM TOPICAL at 10:00

## 2019-07-22 RX ADMIN — Medication 20 MICROGRAM(S): at 18:55

## 2019-07-22 RX ADMIN — CHLORHEXIDINE GLUCONATE 1 APPLICATION(S): 213 SOLUTION TOPICAL at 11:51

## 2019-07-22 RX ADMIN — QUETIAPINE FUMARATE 25 MILLIGRAM(S): 200 TABLET, FILM COATED ORAL at 23:12

## 2019-07-22 RX ADMIN — FAMOTIDINE 20 MILLIGRAM(S): 10 INJECTION INTRAVENOUS at 11:55

## 2019-07-22 NOTE — PROGRESS NOTE ADULT - ASSESSMENT
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 Since admission, went to IR to have PEG replaced (6/25/19)  +fever and RRT over weekend for suspected aspiration event, and with Renal Insufficiency    -Discussed option of GJ tube but explained high risk of clogging and cannot do bolus feeds and pt would still be at risk for aspiration from secretions as she is presently with G tube (spouse and family do not want to explore GJ conversion option)    s/p RRT on 7/13 for hypotension in the SBP 50-60s and hypoxia in the 80s, admitted to MICU for shock requiring IV pressor support. Now stable off pressors, on NC. On stress steroids dosing  DVT on AC  s/p RRT for hypotension 7/21- 7/22 overnight  Anemia - without overt/brisk GI bleed.     RECS:  -keep G tube bumper at ~2-3 cm richard on G tube.  IR to come evaluate tube per discussion with team. no Guaze under bumper please.  Cavilon to martin-stomal skin at G tube site  -G tube feeds  -wound care  -Abx as per ID  -monitor BMs  -Renal following  -Continue Pepcid  -Monitor BMs  -epogen as per Renal    Adam Miller PA-C    [facility]  [phone]  After hours and weekend coverage [phone]

## 2019-07-22 NOTE — PROGRESS NOTE ADULT - SUBJECTIVE AND OBJECTIVE BOX
infectious diseases progress note:    Patient is a 69y old  Female who presents with a chief complaint of PEG tube dislodgement, fever, leukocytosis (21 Jul 2019 15:36)        Gastrostomy malfunction             Allergies    ASA; dye contrast (Anaphylaxis)  aspirin (Short breath)  divalproex sodium (Other (Unknown))  Flowers and Plants (Short breath)  Haldol (Other (Unknown))  penicillin (Short breath; Rash)  sulfa drugs (Short breath; Rash)  vancomycin (Rash; Urticaria; Hives)  Xanax (Other (Unknown))    Intolerances        ANTIBIOTICS/RELEVANT:  antimicrobials    immunologic:    OTHER:  acetaminophen  Suppository .. 650 milliGRAM(s) Rectal every 6 hours PRN  apixaban 2.5 milliGRAM(s) Oral two times a day  artificial tears (preservative free) Ophthalmic Solution 1 Drop(s) Both EYES four times a day  ascorbic acid 500 milliGRAM(s) Oral daily  BACItracin   Ointment 1 Application(s) Topical two times a day  carboxymethylcellulose 0.5% (Preservative-Free) Ophthalmic Solution 1 Drop(s) Both EYES three times a day PRN  chlorhexidine 2% Cloths 1 Application(s) Topical daily  clonazePAM  Tablet 1 milliGRAM(s) Oral at bedtime  Dakins Solution - 1/4 Strength 1 Application(s) Topical two times a day  dextrose 5%. 1000 milliLiter(s) IV Continuous <Continuous>  famotidine    Tablet 20 milliGRAM(s) Oral daily  ferrous    sulfate Liquid 300 milliGRAM(s) Enteral Tube daily  formoterol for nebulization 20 MICROGram(s) Nebulizer two times a day  furosemide Infusion 5 mG/Hr IV Continuous <Continuous>  gabapentin   Solution 300 milliGRAM(s) Oral every 12 hours  glucagon  Injectable 1 milliGRAM(s) IntraMuscular once PRN  hydrocortisone sodium succinate Injectable 25 milliGRAM(s) IV Push every 12 hours  insulin lispro (HumaLOG) corrective regimen sliding scale   SubCutaneous every 6 hours  levothyroxine Injectable 60 MICROGram(s) IV Push at bedtime  Medical Marijuana Awake Oil 2 milliLiter(s),Medical Marijuana Awake Oil 2 milliLiter(s) 2 milliLiter(s) Enteral Tube <User Schedule>  Medical Marijuana Calm Oil 2 milliLiter(s),Medical [place]ju[place] Calm Oil 2 milliLiter(s) 2 milliLiter(s) Enteral Tube <User Schedule>  Medical Marijuana [place] Oil 2 milliLiter(s),Medical Marijuana [place] Oil 2 milliLiter(s) 2 milliLiter(s) Enteral Tube <User Schedule>  metoprolol tartrate 12.5 milliGRAM(s) Oral two times a day  multivitamin/minerals/iron Oral Solution (CENTRUM) 15 milliLiter(s) Oral daily  nystatin Powder 1 Application(s) Topical three times a day PRN  QUEtiapine 25 milliGRAM(s) Oral at bedtime  sodium chloride 0.9%. 1000 milliLiter(s) IV Continuous <Continuous>  tiZANidine 4 milliGRAM(s) Oral <User Schedule>  zinc sulfate 220 milliGRAM(s) Oral daily      Objective:  Vital Signs Last 24 Hrs  T(C): 36.5 (22 Jul 2019 04:38), Max: 37.1 (21 Jul 2019 21:07)  T(F): 97.7 (22 Jul 2019 04:38), Max: 98.7 (21 Jul 2019 21:07)  HR: 79 (22 Jul 2019 04:38) (18 - 105)  BP: 102/64 (22 Jul 2019 04:38) (80/43 - 149/87)  BP(mean): --  RR: 18 (22 Jul 2019 04:38) (18 - 20)  SpO2: 100% (22 Jul 2019 04:38) (83% - 100%)    PHYSICAL EXAM:     Eyes:JACQUELIN, EOMI  Ear/Nose/Throat: no oral lesion, no sinus tenderness on percussion	  Neck:no JVD, no lymphadenopathy, supple        Gastrointestinal:soft, (+) BS, no HSM  Extremities contracted         LABS:                        7.1    18.3  )-----------( 308      ( 22 Jul 2019 02:36 )             20.2     07-22    136  |  94<L>  |  128<H>  ----------------------------<  106<H>  3.5   |  19<L>  |  3.45<H>    Ca    7.9<L>      22 Jul 2019 02:36  Phos  7.5     07-22  Mg     2.1     07-22    TPro  4.7<L>  /  Alb  1.9<L>  /  TBili  0.2  /  DBili  x   /  AST  11  /  ALT  7<L>  /  AlkPhos  72  07-22    PT/INR - ( 22 Jul 2019 02:36 )   PT: 16.4 sec;   INR: 1.42 ratio         PTT - ( 22 Jul 2019 02:36 )  PTT:37.9 sec        MICROBIOLOGY:    RECENT CULTURES:        RESPIRATORY CULTURES:              RADIOLOGY & ADDITIONAL STUDIES:        Pager [phone]  After 5 pm/weekends or if no response :[phone]

## 2019-07-22 NOTE — RAPID RESPONSE TEAM SUMMARY - NSADDTLFINDINGSRRT_GEN_ALL_CORE
Upon arrival to RRT the patient was reciving 250 NS bolus   SBP 80's MAP 59 rr 16 temp 98 spo2 100 on nc   Lasix infusion placed on hold at this time

## 2019-07-22 NOTE — PROGRESS NOTE ADULT - SUBJECTIVE AND OBJECTIVE BOX
NEPHROLOGY-[facility] [phone]        Patient seen and examined in bed.  She had a rapid response called for hypotension         MEDICATIONS  (STANDING):  apixaban 2.5 milliGRAM(s) Oral two times a day  artificial tears (preservative free) Ophthalmic Solution 1 Drop(s) Both EYES four times a day  ascorbic acid 500 milliGRAM(s) Oral daily  BACItracin   Ointment 1 Application(s) Topical two times a day  chlorhexidine 2% Cloths 1 Application(s) Topical daily  clonazePAM  Tablet 1 milliGRAM(s) Oral at bedtime  Dakins Solution - 1/4 Strength 1 Application(s) Topical two times a day  dextrose 5%. 1000 milliLiter(s) (50 mL/Hr) IV Continuous <Continuous>  famotidine    Tablet 20 milliGRAM(s) Oral daily  ferrous    sulfate Liquid 300 milliGRAM(s) Enteral Tube daily  formoterol for nebulization 20 MICROGram(s) Nebulizer two times a day  gabapentin   Solution 300 milliGRAM(s) Oral every 12 hours  hydrocortisone sodium succinate Injectable 25 milliGRAM(s) IV Push every 12 hours  insulin lispro (HumaLOG) corrective regimen sliding scale   SubCutaneous every 6 hours  levothyroxine Injectable 60 MICROGram(s) IV Push at bedtime  Medical Marijuana Awake Oil 2 milliLiter(s),Medical Marijuana Awake Oil 2 milliLiter(s) 2 milliLiter(s) Enteral Tube <User Schedule>  Medical Marijuana Calm Oil 2 milliLiter(s),Medical [place]juana Calm Oil 2 milliLiter(s) 2 milliLiter(s) Enteral Tube <User Schedule>  Medical Marijuana [place] Oil 2 milliLiter(s),Medical Marijuana [place] Oil 2 milliLiter(s) 2 milliLiter(s) Enteral Tube <User Schedule>  metoprolol tartrate 12.5 milliGRAM(s) Oral two times a day  multivitamin/minerals/iron Oral Solution (CENTRUM) 15 milliLiter(s) Oral daily  QUEtiapine 25 milliGRAM(s) Oral at bedtime  sodium chloride 0.9%. 1000 milliLiter(s) (75 mL/Hr) IV Continuous <Continuous>  tiZANidine 4 milliGRAM(s) Oral <User Schedule>  zinc sulfate 220 milliGRAM(s) Oral daily      VITAL:  T(C): , Max: 37.1 (07-21-19 @ 21:07)  T(F): , Max: 98.7 (07-21-19 @ 21:07)  HR: 81 (07-22-19 @ 07:30)  BP: 113/70 (07-22-19 @ 07:30)  BP(mean): --  RR: 18 (07-22-19 @ 07:30)  SpO2: 99% (07-22-19 @ 07:30)  Wt(kg): --    I and O's:    07-21 @ 07:01  -  07-22 @ 07:00  --------------------------------------------------------  IN: 852.5 mL / OUT: 1030 mL / NET: -177.5 mL          PHYSICAL EXAM:    Constitutional: cachetic   Neck:  No JVD  Respiratory: poor effort   Cardiovascular: S1 and S2  Gastrointestinal: BS+, soft, NT/ND  Extremities: +  peripheral edema  Neurological:  no focal deficits  Psychiatric: unable   : No Blackman  Skin: No rashes  Access: Not applicable    LABS:                        7.1    18.3  )-----------( 308      ( 22 Jul 2019 02:36 )             20.2     07-22    136  |  94<L>  |  128<H>  ----------------------------<  106<H>  3.5   |  19<L>  |  3.45<H>    Ca    7.9<L>      22 Jul 2019 02:36  Phos  7.5     07-22  Mg     2.1     07-22    TPro  4.7<L>  /  Alb  1.9<L>  /  TBili  0.2  /  DBili  x   /  AST  11  /  ALT  7<L>  /  AlkPhos  72  07-22          Urine Studies:          RADIOLOGY & ADDITIONAL STUDIES: NEPHROLOGY-[facility] [phone]        Patient seen and examined in bed.  She had a rapid response called for hypotension         MEDICATIONS  (STANDING):  apixaban 2.5 milliGRAM(s) Oral two times a day  artificial tears (preservative free) Ophthalmic Solution 1 Drop(s) Both EYES four times a day  ascorbic acid 500 milliGRAM(s) Oral daily  BACItracin   Ointment 1 Application(s) Topical two times a day  chlorhexidine 2% Cloths 1 Application(s) Topical daily  clonazePAM  Tablet 1 milliGRAM(s) Oral at bedtime  Dakins Solution - 1/4 Strength 1 Application(s) Topical two times a day  dextrose 5%. 1000 milliLiter(s) (50 mL/Hr) IV Continuous <Continuous>  famotidine    Tablet 20 milliGRAM(s) Oral daily  ferrous    sulfate Liquid 300 milliGRAM(s) Enteral Tube daily  formoterol for nebulization 20 MICROGram(s) Nebulizer two times a day  gabapentin   Solution 300 milliGRAM(s) Oral every 12 hours  hydrocortisone sodium succinate Injectable 25 milliGRAM(s) IV Push every 12 hours  insulin lispro (HumaLOG) corrective regimen sliding scale   SubCutaneous every 6 hours  levothyroxine Injectable 60 MICROGram(s) IV Push at bedtime  Medical Marijuana Awake Oil 2 milliLiter(s),Medical Marijuana Awake Oil 2 milliLiter(s) 2 milliLiter(s) Enteral Tube <User Schedule>  Medical Marijuana Calm Oil 2 milliLiter(s),Medical [place]ju[place] Calm Oil 2 milliLiter(s) 2 milliLiter(s) Enteral Tube <User Schedule>  Medical Marijuana [place] Oil 2 milliLiter(s),Medical Marijuana [place] Oil 2 milliLiter(s) 2 milliLiter(s) Enteral Tube <User Schedule>  metoprolol tartrate 12.5 milliGRAM(s) Oral two times a day  multivitamin/minerals/iron Oral Solution (CENTRUM) 15 milliLiter(s) Oral daily  QUEtiapine 25 milliGRAM(s) Oral at bedtime  sodium chloride 0.9%. 1000 milliLiter(s) (75 mL/Hr) IV Continuous <Continuous>  tiZANidine 4 milliGRAM(s) Oral <User Schedule>  zinc sulfate 220 milliGRAM(s) Oral daily      VITAL:  T(C): , Max: 37.1 (07-21-19 @ 21:07)  T(F): , Max: 98.7 (07-21-19 @ 21:07)  HR: 81 (07-22-19 @ 07:30)  BP: 113/70 (07-22-19 @ 07:30)  BP(mean): --  RR: 18 (07-22-19 @ 07:30)  SpO2: 99% (07-22-19 @ 07:30)  Wt(kg): --    I and O's:    07-21 @ 07:01  -  07-22 @ 07:00  --------------------------------------------------------  IN: 852.5 mL / OUT: 1030 mL / NET: -177.5 mL          PHYSICAL EXAM:    Constitutional: cachetic   Neck:  + JVD  Respiratory: poor effort   Cardiovascular: S1 and S2  Gastrointestinal: BS+, soft, NT/ND  Extremities: +  peripheral edema  Neurological:  no focal deficits  Psychiatric: unable   : No Blackman  Skin: No rashes  Access: Not applicable    LABS:                        7.1    18.3  )-----------( 308      ( 22 Jul 2019 02:36 )             20.2     07-22    136  |  94<L>  |  128<H>  ----------------------------<  106<H>  3.5   |  19<L>  |  3.45<H>    Ca    7.9<L>      22 Jul 2019 02:36  Phos  7.5     07-22  Mg     2.1     07-22    TPro  4.7<L>  /  Alb  1.9<L>  /  TBili  0.2  /  DBili  x   /  AST  11  /  ALT  7<L>  /  AlkPhos  72  07-22          Urine Studies:          RADIOLOGY & ADDITIONAL STUDIES:

## 2019-07-22 NOTE — PROVIDER CONTACT NOTE (CRITICAL VALUE NOTIFICATION) - ASSESSMENT
Pt alert, VSS, at baseline mental status. No s/s of bleeding.
Pt alert, VSS, no apparent distress. At baseline mental status.
Pt also has worsening renal function; VSS, no apparent distress.
Pt disorientedx4,nonverbal,w/ VS as charted,PEG tube feed@25ml/hr.
Pt disorientedx4,nonverbal,w/ VS as charted,PEG tube feed@25ml/hr.
Pt disorientedx4,nonverbal,w/ VS as charted,running heparin IV@12ml/hr,PEG tube feed@20ml/hr.
pt alox0. nonverbal . no/s/s of overt bleeding noted. vital signs stable
see flowsheet, s/p rrt for hypotension currently stabilized. critical hemoglobing during RRT from ABG 6.8

## 2019-07-22 NOTE — PROGRESS NOTE ADULT - ASSESSMENT
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 since admission, went to IR to have PEG replaced.  TRANG     1. TRANG likely multifactorial with Creatinine stable.  Hopefully downward trend in creatinine   2.  Anemia; Hgb low; initial Hgb <7; repeat slightly better though <goal   3.      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 ED  with PEG tube being dislodged. since admission, went to IR to have PEG replaced.  TRANG     1. TRANG likely multifactorial with Creatinine stable.  Hopefully downward trend in creatinine   2.  Anemia; Hgb low; initial Hgb <7; repeat slightly better though <goal   3.  Hypotension     RECOMMEND:  - BMP daily   - Lasix is now off.  As she is npo will hold any further Lasix    - Maintain gavin catheter for now  - Strict I & Os  - TF management per primary team; continue TF rate of 25cc/hour   - Procrit 10000 x 1

## 2019-07-22 NOTE — PROGRESS NOTE ADULT - SUBJECTIVE AND OBJECTIVE BOX
HPI:  RRT called for hypotension last night.  Patient now off diuretics.  BUN and creatinine remain elevated.    Review Of Systems:     Unable to assess in the setting of advanced dementia    Medications:  acetaminophen  Suppository .. 650 milliGRAM(s) Rectal every 6 hours PRN  acetylcysteine 10%  Inhalation 5 milliLiter(s) Inhalation three times a day  apixaban 2.5 milliGRAM(s) Oral two times a day  artificial tears (preservative free) Ophthalmic Solution 1 Drop(s) Both EYES four times a day  ascorbic acid 500 milliGRAM(s) Oral daily  BACItracin   Ointment 1 Application(s) Topical two times a day  carboxymethylcellulose 0.5% (Preservative-Free) Ophthalmic Solution 1 Drop(s) Both EYES three times a day PRN  chlorhexidine 2% Cloths 1 Application(s) Topical daily  clonazePAM  Tablet 1 milliGRAM(s) Oral at bedtime  Dakins Solution - 1/4 Strength 1 Application(s) Topical two times a day  dextrose 5%. 1000 milliLiter(s) IV Continuous <Continuous>  famotidine    Tablet 20 milliGRAM(s) Oral daily  ferrous    sulfate Liquid 300 milliGRAM(s) Enteral Tube daily  formoterol for nebulization 20 MICROGram(s) Nebulizer two times a day  gabapentin   Solution 300 milliGRAM(s) Oral every 12 hours  glucagon  Injectable 1 milliGRAM(s) IntraMuscular once PRN  insulin lispro (HumaLOG) corrective regimen sliding scale   SubCutaneous every 6 hours  levothyroxine Injectable 60 MICROGram(s) IV Push at bedtime  Medical Marijuana Awake Oil 2 milliLiter(s),Medical Marijuana Awake Oil 2 milliLiter(s) 2 milliLiter(s) Enteral Tube <User Schedule>  Medical Marijuana Calm Oil 2 milliLiter(s),Medical Marinjuana Calm Oil 2 milliLiter(s) 2 milliLiter(s) Enteral Tube <User Schedule>  Medical Marijuana [place] Oil 2 milliLiter(s),Medical Marijuana [place] Oil 2 milliLiter(s) 2 milliLiter(s) Enteral Tube <User Schedule>  metoprolol tartrate 12.5 milliGRAM(s) Oral two times a day  multivitamin/minerals/iron Oral Solution (CENTRUM) 15 milliLiter(s) Oral daily  nystatin Powder 1 Application(s) Topical three times a day PRN  QUEtiapine 25 milliGRAM(s) Oral at bedtime  sodium chloride 0.9%. 1000 milliLiter(s) IV Continuous <Continuous>  tiZANidine 4 milliGRAM(s) Oral <User Schedule>  zinc sulfate 220 milliGRAM(s) Oral daily    PAST MEDICAL & SURGICAL HISTORY:  Type 2 diabetes mellitus  Dementia  DVT, lower extremity  CVA (cerebral vascular accident)  Fatty pancreas  PNA (pneumonia)  Pulmonary HTN  IGT (impaired glucose tolerance)  Ulcerative colitis  Acid reflux  Anxiety  Depression  Mouth sores  HLD (hyperlipidemia)  Asthma  S/P percutaneous endoscopic gastrostomy (PEG) tube placement: for dysphagia  Humeral head fracture  H/O: hysterectomy  H/O cataract extraction, left  History of knee replacement    Vitals:  T(C): 36.5 (07-22-19 @ 17:56), Max: 36.9 (07-22-19 @ 00:34)  HR: 101 (07-22-19 @ 17:56) (18 - 101)  BP: 123/76 (07-22-19 @ 17:56) (80/43 - 126/82)  BP(mean): --  RR: 18 (07-22-19 @ 17:56) (18 - 20)  SpO2: 99% (07-22-19 @ 17:56) (94% - 100%)  Wt(kg): --  Daily     Daily   I&O's Summary    21 Jul 2019 07:01  -  22 Jul 2019 07:00  --------------------------------------------------------  IN: 852.5 mL / OUT: 1030 mL / NET: -177.5 mL    22 Jul 2019 07:01  -  22 Jul 2019 21:44  --------------------------------------------------------  IN: 170 mL / OUT: 325 mL / NET: -155 mL        Physical Exam:  Appearance: functional quadriplegia   Eyes: PERRLA, EOMI, pink conjunctiva, no scleral icterus   HENT: Normal oral mucosa  Cardiovascular: tachycardic S1, S2, no murmur, rub, or gallop; NO edema in hands or feet; no JVD  Procedural Access Site: Clean, dry, intact, without hematoma  Respiratory: bilateral air entry; poor inspiratory effort  Gastrointestinal: Soft, non-tender, non-distended, BS+, +PEG  Musculoskeletal: +contracted  Neurologic: functional quadriplegia   Lymphatic: No lymphadenopathy  Psychiatry: advanced dementia  Skin: multiple sites of skin breakdown                          9.3    20.6  )-----------( 353      ( 22 Jul 2019 11:00 )             26.6     07-22    136  |  94<L>  |  128<H>  ----------------------------<  106<H>  3.5   |  19<L>  |  3.45<H>    Ca    7.9<L>      22 Jul 2019 02:36  Phos  7.5     07-22  Mg     2.1     07-22    TPro  4.7<L>  /  Alb  1.9<L>  /  TBili  0.2  /  DBili  x   /  AST  11  /  ALT  7<L>  /  AlkPhos  72  07-22    PT/INR - ( 22 Jul 2019 02:36 )   PT: 16.4 sec;   INR: 1.42 ratio         PTT - ( 22 Jul 2019 02:36 )  PTT:37.9 sec      Serum Pro-Brain Natriuretic Peptide: >07052 pg/mL (07-16 @ 12:00)

## 2019-07-22 NOTE — CHART NOTE - NSCHARTNOTEFT_GEN_A_CORE
Called by RN to evaluate patient for low BP. Seen and examined patient at bedside. Patient is  Alert and oriented X 0 with family at bedside. Patient is on Lasix drip, repeat BP 72/44.  Lasix drip Stopped,  cc bolus ordered. Patient is full code and family wants everything to be done RRT called for further evaluation. Will D/W attending in AM.  Vital Signs Last 24 Hrs  T(C): 37.1 (21 Jul 2019 21:07), Max: 37.2 (21 Jul 2019 01:00)  T(F): 98.7 (21 Jul 2019 21:07), Max: 99 (21 Jul 2019 01:00)  HR: 96 (21 Jul 2019 21:07) (95 - 105)  BP: 131/72 (21 Jul 2019 21:07) (107/71 - 149/87)  BP(mean): --  RR: 20 (21 Jul 2019 21:07) (18 - 20)  SpO2: 100% (21 Jul 2019 21:07) (83% - 100%)                          7.1    20.84 )-----------( 376      ( 21 Jul 2019 10:18 )             22.6       07-21    136  |  97  |  124<H>  ----------------------------<  112<H>  3.7   |  19<L>  |  3.41<H>    Ca    8.5      21 Jul 2019 07:23                  Phone: [phone] Called by RN to evaluate patient for low BP. Seen and examined patient at bedside. Patient is  Alert and oriented X 0 with family at bedside. Patient is on Lasix drip, repeat BP 72/44.  Lasix drip placed on hold ,  cc bolus ordered. Patient is full code and family wants everything to be done RRT called for further evaluation. Will D/W attending in AM.  Vital Signs Last 24 Hrs  T(C): 37.1 (21 Jul 2019 21:07), Max: 37.2 (21 Jul 2019 01:00)  T(F): 98.7 (21 Jul 2019 21:07), Max: 99 (21 Jul 2019 01:00)  HR: 96 (21 Jul 2019 21:07) (95 - 105)  BP: 131/72 (21 Jul 2019 21:07) (107/71 - 149/87)  BP(mean): --  RR: 20 (21 Jul 2019 21:07) (18 - 20)  SpO2: 100% (21 Jul 2019 21:07) (83% - 100%)                          7.1    20.84 )-----------( 376      ( 21 Jul 2019 10:18 )             22.6       07-21    136  |  97  |  124<H>  ----------------------------<  112<H>  3.7   |  19<L>  |  3.41<H>    Ca    8.5      21 Jul 2019 07:23                  Phone: [phone]

## 2019-07-22 NOTE — PROVIDER CONTACT NOTE (CRITICAL VALUE NOTIFICATION) - PERSON GIVING RESULT:
[facility], Danielle Gutiérrez
Luci Steward
Mario Rene from Lab
Sondra Goode
Tati Alvarado, chemistry lab
Tati Alvarado; chemistry lab
megan roque
nancy rojo
tiffany
Charly Gutiérrez - core lab

## 2019-07-22 NOTE — PROGRESS NOTE ADULT - SUBJECTIVE AND OBJECTIVE BOX
Interventional Radiology     IR was requested to evaluate the patient's gastrostomy tube that was replaced by IR on 6/25/19.  As per the primary team and patient's  the gastrostomy tube is leaking when it is used.      < from: IR Procedure (06.25.19 @ 14:56) >  the gastrostomy tube was exchanged over a guidewire   under fluoroscopic guidance for a 22 Dominican gastrostomy tube which was   placed with its tip in the stomach.    Impression: Exchange and upsizing of gastrostomy tube using fluoroscopic   guidance as above.    < end of copied text >    The gastrostomy tube is intact and was flushed with sterile water.  When the tube was initially evakuated the plastic disc was not pressed down to the skin and G-tube demonstrated minimal oozing when flushed.  After the disc was cinched all the way down there was no further leaking noted.      I discussed with the patient's  who was present at bedside in detail that the G-tube needs to always have plastic disc cinched down against the skin.  Recommend to place a drain gauze under the feeding tube when cinching it down.    Above was d/w primary team covering BENJI Rosario.      Tyrell Heredia, HECTOR-C  [facility] [id number]  Ext [phone] Interventional Radiology     IR was requested to evaluate the patient's gastrostomy tube that was replaced by IR on 6/25/19.  As per the primary team and patient's  the gastrostomy tube is leaking when it is used.      < from: IR Procedure (06.25.19 @ 14:56) >  the gastrostomy tube was exchanged over a guidewire   under fluoroscopic guidance for a 22 Ecuadorean gastrostomy tube which was   placed with its tip in the stomach.    Impression: Exchange and upsizing of gastrostomy tube using fluoroscopic   guidance as above.    < end of copied text >    The gastrostomy tube is intact and was flushed with sterile water.  When the tube was initially evaluated the plastic disc was not pressed down to the skin and G-tube demonstrated minimal oozing when flushed.  After the disc was cinched all the way down there was no further leaking noted.      I discussed with the patient's  who was present at bedside in detail that the G-tube needs to always have plastic disc cinched down against the skin.  Discussed with covering RN who was present in the room.  Recommend to place a drain gauze under the feeding tube when cinching it down.      Above was d/w primary team covering NP Marlene.      Tyrell Heredia, HECTOR-C  [facility] [id number]  Ext [phone]

## 2019-07-22 NOTE — RAPID RESPONSE TEAM SUMMARY - NSSITUATIONBACKGROUNDRRT_GEN_ALL_CORE
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 (+) Peg replacement this admission and PNA   Today RRT was called for hypotension.

## 2019-07-22 NOTE — RAPID RESPONSE TEAM SUMMARY - NSOTHERINTERVENTIONSRRT_GEN_ALL_CORE
CXR  Levophed  IVF LR  Labs
Would Hold Lasix gtt tonight please have Primary team evaluate for possible TID dosing   Please provide hold parameters for all anti- htn medications for SBP <110 or hrt rate <60   Please consider spacing all PM medications at least 2 hrs apart   Please f/u on all Lab Draws during RRT

## 2019-07-22 NOTE — RAPID RESPONSE TEAM SUMMARY - NSMEDICATIONSRRT_GEN_ALL_CORE
No medication was administered during the RRT -   The patient responded well to IV bolus with MAP 65 - 67 sbp 100's

## 2019-07-22 NOTE — PROVIDER CONTACT NOTE (CRITICAL VALUE NOTIFICATION) - TEST AND RESULT REPORTED:
Hct 20.8
7.1/20.2
CBC H/H 7.5/20.9
H/H 6.9/22
Vanco trough 25.9
Vanco trough 30.5
Vanco trough level 33.6
critical high aPTT 176.1
h and h 7.1/20.8
h/h 6.9/21.6

## 2019-07-22 NOTE — CHART NOTE - NSCHARTNOTEFT_GEN_A_CORE
Called by RN to report critical lab value H/H, 7.1/20.2, ABG H/H 6.8/21. Seen and examined patient at bedside. Patient skin color unchanged, Patient is unable to verbalized her needs. Type and screen result pending. Nephrology recommendation transfuse for Hgb <7. Will D/W attending.    Vital Signs Last 24 Hrs  T(C): 36.8 (22 Jul 2019 01:23), Max: 37.1 (21 Jul 2019 21:07)  T(F): 98.2 (22 Jul 2019 01:23), Max: 98.7 (21 Jul 2019 21:07)  HR: 83 (22 Jul 2019 01:23) (18 - 105)  BP: 112/72 (22 Jul 2019 01:23) (80/43 - 149/87)  BP(mean): --  RR: 18 (22 Jul 2019 01:23) (18 - 20)  SpO2: 100% (22 Jul 2019 01:23) (83% - 100%)                          7.1    18.3  )-----------( 308      ( 22 Jul 2019 02:36 )             20.2       07-21    136  |  97  |  124<H>  ----------------------------<  112<H>  3.7   |  19<L>  |  3.41<H>    Ca    8.5      21 Jul 2019 07:23        PT/INR - ( 22 Jul 2019 02:36 )   PT: 16.4 sec;   INR: 1.42 ratio         PTT - ( 22 Jul 2019 02:36 )  PTT:37.9 sec Called by RN to report critical lab value H/H, 7.1/20.2, ABG H/H 6.8/21. Seen and examined patient at bedside. Patient skin color unchanged, Patient is unable to verbalized her needs. Type and screen result pending. Nephrology recommendation transfuse for Hgb <7.  D/W attending who recommended 1 unit of PRBC. Will continue to monitor.    Vital Signs Last 24 Hrs  T(C): 36.8 (22 Jul 2019 01:23), Max: 37.1 (21 Jul 2019 21:07)  T(F): 98.2 (22 Jul 2019 01:23), Max: 98.7 (21 Jul 2019 21:07)  HR: 83 (22 Jul 2019 01:23) (18 - 105)  BP: 112/72 (22 Jul 2019 01:23) (80/43 - 149/87)  BP(mean): --  RR: 18 (22 Jul 2019 01:23) (18 - 20)  SpO2: 100% (22 Jul 2019 01:23) (83% - 100%)                          7.1    18.3  )-----------( 308      ( 22 Jul 2019 02:36 )             20.2       07-21    136  |  97  |  124<H>  ----------------------------<  112<H>  3.7   |  19<L>  |  3.41<H>    Ca    8.5      21 Jul 2019 07:23        PT/INR - ( 22 Jul 2019 02:36 )   PT: 16.4 sec;   INR: 1.42 ratio         PTT - ( 22 Jul 2019 02:36 )  PTT:37.9 sec

## 2019-07-22 NOTE — PROGRESS NOTE ADULT - ASSESSMENT
Patient is 69 year-old woman with advanced dementia and functional quadriplegia presents with fevers, leukocytosis in the setting of PEG dislodgement. Patient with acute kidney injury that is likely multifactorial.   Patient still appears total volume overloaded with +JVD and diffuse edema/anasarca in the setting of worsening renal function.     Antibiotics per ID.  PEG management and feeds per GI/nutrition.    Appreciate nephrology input: currently off Lasix gtt.    Trend daily labs. Avoid nephrotoxic agents.

## 2019-07-22 NOTE — PROVIDER CONTACT NOTE (CRITICAL VALUE NOTIFICATION) - ACTION/TREATMENT ORDERED:
type and screen stat done transfuse one prbc
NP Ritu Morataya aware, no new order made.
Will discuss with team and order transfusion if indicated. Will monitor.
As above. Trough in am.
Continue to monitor. Vanco already discontinued.
Follow heparin nomogram. Hold 1 hour and restart in 1 hour at rate of 10ml/hr. Rescan to restart in 1 hour. Primary RN Ricarda bobo.
as per BRET Blank, no Immediate interventions at this time, will continue to monitor,
wait for results of CBC lab draw for H/H
will continue to monitor
will discuss with primary MD and review need for possible 1 unit pRBC

## 2019-07-22 NOTE — PROGRESS NOTE ADULT - SUBJECTIVE AND OBJECTIVE BOX
---___---___---___---___---___---___ ---___---___---___---___---___---___---___---___---                  M E D I C A L   A T T E N D I N G   P R O G R E S S   N O T E  ---___---___---___---___---___---___ ---___---___---___---___---___---___---___---___---        ================================================    ++CHIEF COMPLAINT:   Patient is a 69y old  Female who presents with a chief complaint of PEG tube dislodgement, fever, leukocytosis (2019 12:46)    libia and rrt yesterday with transfusion one unit   ---___---___---___---___---___---  PAST MEDICAL / Surgical  HISTORY:  PAST MEDICAL & SURGICAL HISTORY:  Type 2 diabetes mellitus  Dementia  DVT, lower extremity  CVA (cerebral vascular accident)  Fatty pancreas  PNA (pneumonia)  Pulmonary HTN  IGT (impaired glucose tolerance)  Ulcerative colitis  Acid reflux  Anxiety  Depression  Mouth sores  HLD (hyperlipidemia)  Asthma  S/P percutaneous endoscopic gastrostomy (PEG) tube placement: for dysphagia  Humeral head fracture  H/O: hysterectomy  H/O cataract extraction, left  History of knee replacement      ---___---___---___---___---___---  FAMILY HISTORY:   FAMILY HISTORY:  Family history of dementia (Grandparent)  Family history of colon cancer (Grandparent)        ---___---___---___---___---___---  ALLERGIES:   Allergies    ASA; dye contrast (Anaphylaxis)  aspirin (Short breath)  divalproex sodium (Other (Unknown))  Flowers and Plants (Short breath)  Haldol (Other (Unknown))  penicillin (Short breath; Rash)  sulfa drugs (Short breath; Rash)  vancomycin (Rash; Urticaria; Hives)  Xanax (Other (Unknown))    Intolerances        ---___---___---___---___---___---  MEDICATIONS:  MEDICATIONS  (STANDING):  acetylcysteine 10%  Inhalation 5 milliLiter(s) Inhalation three times a day  apixaban 2.5 milliGRAM(s) Oral two times a day  artificial tears (preservative free) Ophthalmic Solution 1 Drop(s) Both EYES four times a day  ascorbic acid 500 milliGRAM(s) Oral daily  BACItracin   Ointment 1 Application(s) Topical two times a day  chlorhexidine 2% Cloths 1 Application(s) Topical daily  clonazePAM  Tablet 1 milliGRAM(s) Oral at bedtime  Dakins Solution - 1/4 Strength 1 Application(s) Topical two times a day  dextrose 5%. 1000 milliLiter(s) (50 mL/Hr) IV Continuous <Continuous>  famotidine    Tablet 20 milliGRAM(s) Oral daily  ferrous    sulfate Liquid 300 milliGRAM(s) Enteral Tube daily  formoterol for nebulization 20 MICROGram(s) Nebulizer two times a day  gabapentin   Solution 300 milliGRAM(s) Oral every 12 hours  insulin lispro (HumaLOG) corrective regimen sliding scale   SubCutaneous every 6 hours  levothyroxine Injectable 60 MICROGram(s) IV Push at bedtime  Medical Marijuana Awake Oil 2 milliLiter(s),Medical Marijuana Awake Oil 2 milliLiter(s) 2 milliLiter(s) Enteral Tube <User Schedule>  Medical Marijuana Calm Oil 2 milliLiter(s),Medical Marinjuana Calm Oil 2 milliLiter(s) 2 milliLiter(s) Enteral Tube <User Schedule>  Medical Marijuana [place] Oil 2 milliLiter(s),Medical Marijuana [place] Oil 2 milliLiter(s) 2 milliLiter(s) Enteral Tube <User Schedule>  metoprolol tartrate 12.5 milliGRAM(s) Oral two times a day  multivitamin/minerals/iron Oral Solution (CENTRUM) 15 milliLiter(s) Oral daily  QUEtiapine 25 milliGRAM(s) Oral at bedtime  sodium chloride 0.9%. 1000 milliLiter(s) (75 mL/Hr) IV Continuous <Continuous>  tiZANidine 4 milliGRAM(s) Oral <User Schedule>  zinc sulfate 220 milliGRAM(s) Oral daily    MEDICATIONS  (PRN):  acetaminophen  Suppository .. 650 milliGRAM(s) Rectal every 6 hours PRN Temp greater or equal to 38C (100.4F), Mild Pain (1 - 3)  carboxymethylcellulose 0.5% (Preservative-Free) Ophthalmic Solution 1 Drop(s) Both EYES three times a day PRN dry eyes  glucagon  Injectable 1 milliGRAM(s) IntraMuscular once PRN Glucose LESS THAN 70 milligrams/deciliter  nystatin Powder 1 Application(s) Topical three times a day PRN under breasts      ---___---___---___---___---___---  REVIEW OF SYSTEM:  unable to obtian     ---___---___---___---___---___---  VITAL SIGNS:  69y , CAPILLARY BLOOD GLUCOSE      POCT Blood Glucose.: 154 mg/dL (2019 17:43)    T(C): 36.5 (19 @ 17:56), Max: 37.1 (19 @ 21:07)  HR: 101 (19 @ 17:56) (18 - 101)  BP: 123/76 (19 @ 17:56) (80/43 - 131/72)  RR: 18 (19 @ 17:56) (18 - 20)  SpO2: 99% (19 @ 17:56) (94% - 100%)  ---___---___---___---___---___---  PHYSICAL EXAM:    GEN: A&O X 0 , NAD , comfortable  HEENT: NCAT, PERRL, MMM, hearing intact  Neck: supple , no JVD  CVS: S1S2 , regular , No M/R/G appreciated  PULM: CTA B/L,  no W/R/R appreciated  ABD.: soft. non tender, non distended,  bowel sounds present peg noted   Extrem: intact pulses , no edema  ulceration to nasal septum from chronic long term nasal cannula   Derm: No rash , no ecchymoses sacral decubitus noted  large         ---___---___---___---___---___---            LAB AND IMAGIN.3    20.6  )-----------( 353      ( 2019 11:00 )             26.6               07-22    136  |  94<L>  |  128<H>  ----------------------------<  106<H>  3.5   |  19<L>  |  3.45<H>    Ca    7.9<L>      2019 02:36  Phos  7.5       Mg     2.1         TPro  4.7<L>  /  Alb  1.9<L>  /  TBili  0.2  /  DBili  x   /  AST  11  /  ALT  7<L>  /  AlkPhos  72  07-22    PT/INR - ( 2019 02:36 )   PT: 16.4 sec;   INR: 1.42 ratio         PTT - ( 2019 02:36 )  PTT:37.9 sec                      ABG - ( 2019 01:21 )  pH, Arterial: 7.42  pH, Blood: x     /  pCO2: 32    /  pO2: 152   / HCO3: 21    / Base Excess: -3.0  /  SaO2: 99              xray report shows redemonstrated fracture     [All pertinent / recent Imaging reviewed]         ---___---___---___---___---___---___ ---___---___---___---___---                         A S S E S S M E N T   A N D   P L A N :      HEALTH ISSUES - PROBLEM Dx:     anemia   tranfused one unit  follow cbc   libia still trending higher  monitor labs   renal following  chrinic pain from fracture left  leg and contractures continue current meds   agitation mainly at night on klonopin and seroquel   -GI/DVT Prophylaxis. on eliquis     change nasal cannula to venti mask     overall prognosis poor     --------------------------------------------  Case discussed with   Education given on   ___________________________  Thank you,  Deniz Bolden  [phone]

## 2019-07-22 NOTE — PROVIDER CONTACT NOTE (CRITICAL VALUE NOTIFICATION) - RECOMMENDATIONS
Transfuse as needed if appropriate with pt's plan of care.
1 unit prbc
Continue to monitor. Pt on IV fluid. Vanco already discontinued.
Follow heparin nomogram. Hold 1 hour and restart in 1 hour at rate of 10ml/hr. Rescan to restart in 1 hour.
Hold vanco and recheck trough before next dose.
PRBC?
PRBC?
no new orders at this time . pts stat lab cbc sent at 1600hrs resulted h/h 8.1/23.1. as per our lab this specimen from morning. core lab resulted late

## 2019-07-22 NOTE — PROGRESS NOTE ADULT - SUBJECTIVE AND OBJECTIVE BOX
PULMONARY PROGRESS NOTE    MYRIAM HEATH  MRN-[id number]    Patient is a 69y old  Female who presents with a chief complaint of PEG tube dislodgement, fever, leukocytosis (22 Jul 2019 11:52)      HPI:  remains on 2L NC  s/p RRT last night    ROS:   -    ACTIVE MEDICATION LIST:  MEDICATIONS  (STANDING):  apixaban 2.5 milliGRAM(s) Oral two times a day  artificial tears (preservative free) Ophthalmic Solution 1 Drop(s) Both EYES four times a day  ascorbic acid 500 milliGRAM(s) Oral daily  BACItracin   Ointment 1 Application(s) Topical two times a day  chlorhexidine 2% Cloths 1 Application(s) Topical daily  clonazePAM  Tablet 1 milliGRAM(s) Oral at bedtime  Dakins Solution - 1/4 Strength 1 Application(s) Topical two times a day  dextrose 5%. 1000 milliLiter(s) (50 mL/Hr) IV Continuous <Continuous>  famotidine    Tablet 20 milliGRAM(s) Oral daily  ferrous    sulfate Liquid 300 milliGRAM(s) Enteral Tube daily  formoterol for nebulization 20 MICROGram(s) Nebulizer two times a day  furosemide   Injectable 40 milliGRAM(s) IV Push two times a day  gabapentin   Solution 300 milliGRAM(s) Oral every 12 hours  insulin lispro (HumaLOG) corrective regimen sliding scale   SubCutaneous every 6 hours  levothyroxine Injectable 60 MICROGram(s) IV Push at bedtime  Medical Marijuana Awake Oil 2 milliLiter(s),Medical Marijuana Awake Oil 2 milliLiter(s) 2 milliLiter(s) Enteral Tube <User Schedule>  Medical Marijuana Calm Oil 2 milliLiter(s),Medical Marinjuana Calm Oil 2 milliLiter(s) 2 milliLiter(s) Enteral Tube <User Schedule>  Medical Marijuana [place] Oil 2 milliLiter(s),Medical Marijuana [place] Oil 2 milliLiter(s) 2 milliLiter(s) Enteral Tube <User Schedule>  metoprolol tartrate 12.5 milliGRAM(s) Oral two times a day  multivitamin/minerals/iron Oral Solution (CENTRUM) 15 milliLiter(s) Oral daily  QUEtiapine 25 milliGRAM(s) Oral at bedtime  sodium chloride 0.9%. 1000 milliLiter(s) (75 mL/Hr) IV Continuous <Continuous>  tiZANidine 4 milliGRAM(s) Oral <User Schedule>  zinc sulfate 220 milliGRAM(s) Oral daily    MEDICATIONS  (PRN):  acetaminophen  Suppository .. 650 milliGRAM(s) Rectal every 6 hours PRN Temp greater or equal to 38C (100.4F), Mild Pain (1 - 3)  carboxymethylcellulose 0.5% (Preservative-Free) Ophthalmic Solution 1 Drop(s) Both EYES three times a day PRN dry eyes  glucagon  Injectable 1 milliGRAM(s) IntraMuscular once PRN Glucose LESS THAN 70 milligrams/deciliter  nystatin Powder 1 Application(s) Topical three times a day PRN under breasts      EXAM:  Vital Signs Last 24 Hrs  T(C): 36.8 (22 Jul 2019 10:35), Max: 37.1 (21 Jul 2019 21:07)  T(F): 98.2 (22 Jul 2019 10:35), Max: 98.7 (21 Jul 2019 21:07)  HR: 91 (22 Jul 2019 10:35) (18 - 96)  BP: 126/82 (22 Jul 2019 10:35) (80/43 - 149/87)  BP(mean): --  RR: 18 (22 Jul 2019 10:35) (18 - 20)  SpO2: 94% (22 Jul 2019 10:35) (94% - 100%)    GENERAL: The patient is lethargic; not opening her eyes    LUNGS: decrease breath sounds  ; respirations unlabored    HEART: Regular rate and rhythm without murmur.                            9.3    20.6  )-----------( 353      ( 22 Jul 2019 11:00 )             26.6       07-22    136  |  94<L>  |  128<H>  ----------------------------<  106<H>  3.5   |  19<L>  |  3.45<H>    Ca    7.9<L>      22 Jul 2019 02:36  Phos  7.5     07-22  Mg     2.1     07-22    TPro  4.7<L>  /  Alb  1.9<L>  /  TBili  0.2  /  DBili  x   /  AST  11  /  ALT  7<L>  /  AlkPhos  72  07-22    < from: Xray Chest 1 View- PORTABLE-Urgent (07.21.19 @ 14:47) >  EXAM:  XR CHEST PORTABLE URGENT 1V                            PROCEDURE DATE:  07/21/2019            INTERPRETATION:  Indication: Opacification of the left lung.    Technique: Single AP view of the chest with patient in upright position.    Comparison: 7/21/2019 at 12:19 PM    Impression: Patient's chin obscures the left lung apex. Again noted is   complete opacification of the left hemithorax likely secondary to a large   left pleural effusion and/or atelectasis. There is mild pulmonary   vascular congestion.                    ANMOL HUNT M.D., ATTENDING RADIOLOGIST  This document has been electronically signed. Jul 21 2019  2:35PM              < end of copied text >  < from: Xray Chest 1 View- PORTABLE-Urgent (07.21.19 @ 14:47) >  EXAM:  XR CHEST PORTABLE URGENT 1V                            PROCEDURE DATE:  07/21/2019            INTERPRETATION:  Indication: Opacification of the left lung.    Technique: Single AP view of the chest with patient in upright position.    Comparison: 7/21/2019 at 12:19 PM    Impression: Patient's chin obscures the left lung apex. Again noted is   complete opacification of the left hemithorax likely secondary to a large   left pleural effusion and/or atelectasis. There is mild pulmonary   vascular congestion.                    ANMOL HUNT M.D., ATTENDING RADIOLOGIST  This document has been electronically signed. Jul 21 2019  2:35PM              < end of copied text >      PROBLEM LIST:  69y Female with HEALTH ISSUES - PROBLEM Dx:  Fever: Fever  Type 2 diabetes mellitus without complication, without long-term current use of insulin:   Make sure you get your HgA1c checked every three months.  If you take oral diabetes medications, check your blood glucose two times a day.  If you take insulin, check your blood glucose before meals and at bedtime.  It&#x27;s important not to skip any meals.  Keep a log of your blood glucose results and always take it with you to your doctor appointments.  Keep a list of your current medications including injectables and over the counter medications and bring this medication list with you to all your doctor appointments.  If you have not seen your ophthalmologist this year call for appointment.  Check your feet daily for redness, sores, or openings. Do not self treat. If no improvement in two days call your primary care physician for an appointment.  Low blood sugar (hypoglycemia) is a blood sugar below 70mg/dl. Check your blood sugar if you feel signs/symptoms of hypoglycemia. If your blood sugar is below 70 take 15 grams of carbohydrates (ex 4 oz of apple juice, 3-4 glucose tablets, or 4-6 oz of regular soda) wait 15 minutes and repeat blood sugar to make sure it comes up above 70.  If your blood sugar is above 70 and you are due for a meal, have a meal.  If you are not due for a meal have a snack.  This snack helps keeps your blood sugar at a safe range.    Pressure injury of skin of sacral region, unspecified injury stage: Continue with wound care as instructed.   Maintain adequate nutrition, frequent repositioning , offloading with Zfloat boots.  Follow-up with your primary care physician and Wound Care Specialist within 1 week. Call for appointment.    Chronic deep vein thrombosis (DVT) of right lower extremity, unspecified vein: Take your &quot;blood thinners&quot; as prescribed.  Walking is encouraged, increase activity as tolerated.  If you develop new leg pain, swelling, and/or redness contact your healthcare provider.  If you develop new chest pain with difficulty breathing, a rapid heart rate and/or a feeling of passing out call emergency medical services 911.    Prophylactic measure: Prophylactic measure  Dementia without behavioral disturbance, unspecified dementia type: Continue with medications as prescribed by your doctor.  Maintain safe environment.  Maintain adequate nutrition, hydration.  Follow-up with your primary care physician within 1 week. Call for appointment.    Uncomplicated asthma, unspecified asthma severity, unspecified whether persistent: Stable.  Follow-up with your primary care physician within 1 week. Call for appointment.    Sepsis, due to unspecified organism: Take all antibiotics as ordered.  Call you Health care provider upon arrival home to make a one week follow up appointment.  If you develop fever, chills, malaise, or change in mental status call your Health Care Provider or go to the Emergency Department.  Nutrition is important, eat small frequent meals to help ensure you get adequate calories.  Do not stay in bed all day!  Increase your activity daily as tolerated.    PEG tube malfunction: PEG tube malfunction            RECS:  hold feeds- given xray findings- concern she aspirated  good lung down (place patient on right ipsilateral side)  chest PT increase to TID  nebs around the clock  Mucomyst to help with secretions  bedside US chest to eval for effusions- less likely   continue 02          Please call with any questions.    Leigh Ann Resendez, DO  [facility] Pulmonary/Sleep Medicine  [phone] PULMONARY PROGRESS NOTE    MYRIAM HEATH  MRN-[id number]    Patient is a 69y old  Female who presents with a chief complaint of PEG tube dislodgement, fever, leukocytosis (22 Jul 2019 11:52)      HPI:  remains on 2L NC  s/p RRT last night    ROS:   -    ACTIVE MEDICATION LIST:  MEDICATIONS  (STANDING):  apixaban 2.5 milliGRAM(s) Oral two times a day  artificial tears (preservative free) Ophthalmic Solution 1 Drop(s) Both EYES four times a day  ascorbic acid 500 milliGRAM(s) Oral daily  BACItracin   Ointment 1 Application(s) Topical two times a day  chlorhexidine 2% Cloths 1 Application(s) Topical daily  clonazePAM  Tablet 1 milliGRAM(s) Oral at bedtime  Dakins Solution - 1/4 Strength 1 Application(s) Topical two times a day  dextrose 5%. 1000 milliLiter(s) (50 mL/Hr) IV Continuous <Continuous>  famotidine    Tablet 20 milliGRAM(s) Oral daily  ferrous    sulfate Liquid 300 milliGRAM(s) Enteral Tube daily  formoterol for nebulization 20 MICROGram(s) Nebulizer two times a day  furosemide   Injectable 40 milliGRAM(s) IV Push two times a day  gabapentin   Solution 300 milliGRAM(s) Oral every 12 hours  insulin lispro (HumaLOG) corrective regimen sliding scale   SubCutaneous every 6 hours  levothyroxine Injectable 60 MICROGram(s) IV Push at bedtime  Medical Marijuana Awake Oil 2 milliLiter(s),Medical Marijuana Awake Oil 2 milliLiter(s) 2 milliLiter(s) Enteral Tube <User Schedule>  Medical Marijuana Calm Oil 2 milliLiter(s),Medical [place] Calm Oil 2 milliLiter(s) 2 milliLiter(s) Enteral Tube <User Schedule>  Medical Marijuana [place] Oil 2 milliLiter(s),Medical Marijuana [place] Oil 2 milliLiter(s) 2 milliLiter(s) Enteral Tube <User Schedule>  metoprolol tartrate 12.5 milliGRAM(s) Oral two times a day  multivitamin/minerals/iron Oral Solution (CENTRUM) 15 milliLiter(s) Oral daily  QUEtiapine 25 milliGRAM(s) Oral at bedtime  sodium chloride 0.9%. 1000 milliLiter(s) (75 mL/Hr) IV Continuous <Continuous>  tiZANidine 4 milliGRAM(s) Oral <User Schedule>  zinc sulfate 220 milliGRAM(s) Oral daily    MEDICATIONS  (PRN):  acetaminophen  Suppository .. 650 milliGRAM(s) Rectal every 6 hours PRN Temp greater or equal to 38C (100.4F), Mild Pain (1 - 3)  carboxymethylcellulose 0.5% (Preservative-Free) Ophthalmic Solution 1 Drop(s) Both EYES three times a day PRN dry eyes  glucagon  Injectable 1 milliGRAM(s) IntraMuscular once PRN Glucose LESS THAN 70 milligrams/deciliter  nystatin Powder 1 Application(s) Topical three times a day PRN under breasts      EXAM:  Vital Signs Last 24 Hrs  T(C): 36.8 (22 Jul 2019 10:35), Max: 37.1 (21 Jul 2019 21:07)  T(F): 98.2 (22 Jul 2019 10:35), Max: 98.7 (21 Jul 2019 21:07)  HR: 91 (22 Jul 2019 10:35) (18 - 96)  BP: 126/82 (22 Jul 2019 10:35) (80/43 - 149/87)  BP(mean): --  RR: 18 (22 Jul 2019 10:35) (18 - 20)  SpO2: 94% (22 Jul 2019 10:35) (94% - 100%)    GENERAL: The patient is lethargic; not opening her eyes    LUNGS: decrease breath sounds  ; respirations unlabored    HEART: Regular rate and rhythm without murmur.                            9.3    20.6  )-----------( 353      ( 22 Jul 2019 11:00 )             26.6       07-22    136  |  94<L>  |  128<H>  ----------------------------<  106<H>  3.5   |  19<L>  |  3.45<H>    Ca    7.9<L>      22 Jul 2019 02:36  Phos  7.5     07-22  Mg     2.1     07-22    TPro  4.7<L>  /  Alb  1.9<L>  /  TBili  0.2  /  DBili  x   /  AST  11  /  ALT  7<L>  /  AlkPhos  72  07-22    < from: Xray Chest 1 View- PORTABLE-Urgent (07.21.19 @ 14:47) >  EXAM:  XR CHEST PORTABLE URGENT 1V                            PROCEDURE DATE:  07/21/2019            INTERPRETATION:  Indication: Opacification of the left lung.    Technique: Single AP view of the chest with patient in upright position.    Comparison: 7/21/2019 at 12:19 PM    Impression: Patient's chin obscures the left lung apex. Again noted is   complete opacification of the left hemithorax likely secondary to a large   left pleural effusion and/or atelectasis. There is mild pulmonary   vascular congestion.                    ANMOL HUNT M.D., ATTENDING RADIOLOGIST  This document has been electronically signed. Jul 21 2019  2:35PM              < end of copied text >  < from: Xray Chest 1 View- PORTABLE-Urgent (07.21.19 @ 14:47) >  EXAM:  XR CHEST PORTABLE URGENT 1V                            PROCEDURE DATE:  07/21/2019            INTERPRETATION:  Indication: Opacification of the left lung.    Technique: Single AP view of the chest with patient in upright position.    Comparison: 7/21/2019 at 12:19 PM    Impression: Patient's chin obscures the left lung apex. Again noted is   complete opacification of the left hemithorax likely secondary to a large   left pleural effusion and/or atelectasis. There is mild pulmonary   vascular congestion.                    ANMOL HUNT M.D., ATTENDING RADIOLOGIST  This document has been electronically signed. Jul 21 2019  2:35PM              < end of copied text >      PROBLEM LIST:  69y Female with HEALTH ISSUES - PROBLEM Dx:  Fever: Fever  Type 2 diabetes mellitus without complication, without long-term current use of insulin:   Make sure you get your HgA1c checked every three months.  If you take oral diabetes medications, check your blood glucose two times a day.  If you take insulin, check your blood glucose before meals and at bedtime.  It&#x27;s important not to skip any meals.  Keep a log of your blood glucose results and always take it with you to your doctor appointments.  Keep a list of your current medications including injectables and over the counter medications and bring this medication list with you to all your doctor appointments.  If you have not seen your ophthalmologist this year call for appointment.  Check your feet daily for redness, sores, or openings. Do not self treat. If no improvement in two days call your primary care physician for an appointment.  Low blood sugar (hypoglycemia) is a blood sugar below 70mg/dl. Check your blood sugar if you feel signs/symptoms of hypoglycemia. If your blood sugar is below 70 take 15 grams of carbohydrates (ex 4 oz of apple juice, 3-4 glucose tablets, or 4-6 oz of regular soda) wait 15 minutes and repeat blood sugar to make sure it comes up above 70.  If your blood sugar is above 70 and you are due for a meal, have a meal.  If you are not due for a meal have a snack.  This snack helps keeps your blood sugar at a safe range.    Pressure injury of skin of sacral region, unspecified injury stage: Continue with wound care as instructed.   Maintain adequate nutrition, frequent repositioning , offloading with Zfloat boots.  Follow-up with your primary care physician and Wound Care Specialist within 1 week. Call for appointment.    Chronic deep vein thrombosis (DVT) of right lower extremity, unspecified vein: Take your &quot;blood thinners&quot; as prescribed.  Walking is encouraged, increase activity as tolerated.  If you develop new leg pain, swelling, and/or redness contact your healthcare provider.  If you develop new chest pain with difficulty breathing, a rapid heart rate and/or a feeling of passing out call emergency medical services 911.    Prophylactic measure: Prophylactic measure  Dementia without behavioral disturbance, unspecified dementia type: Continue with medications as prescribed by your doctor.  Maintain safe environment.  Maintain adequate nutrition, hydration.  Follow-up with your primary care physician within 1 week. Call for appointment.    Uncomplicated asthma, unspecified asthma severity, unspecified whether persistent: Stable.  Follow-up with your primary care physician within 1 week. Call for appointment.    Sepsis, due to unspecified organism: Take all antibiotics as ordered.  Call you Health care provider upon arrival home to make a one week follow up appointment.  If you develop fever, chills, malaise, or change in mental status call your Health Care Provider or go to the Emergency Department.  Nutrition is important, eat small frequent meals to help ensure you get adequate calories.  Do not stay in bed all day!  Increase your activity daily as tolerated.    PEG tube malfunction: PEG tube malfunction            RECS:  hold feeds- given xray findings- concern she aspirated  good lung down (place patient on right ipsilateral side)  chest PT increase to TID  nebs around the clock  Mucomyst to help with secretions  bedside US chest to eval for effusions- less likely   continue 02  continue solucortef daily X3 days, with plan to start prednisone thereafter           Please call with any questions.    Leigh Ann Resendez DO  [facility] Pulmonary/Sleep Medicine  [phone]

## 2019-07-22 NOTE — PROGRESS NOTE ADULT - ASSESSMENT
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  Fever and leukocytosis now. Abnormal CT chest.    , patient found with hypoxia and AMS suspect from aspiration event.            aspiration/pna        unresponsive today  reculture for signs  of infection      chest xray  with   aspiration      plan to complete meropenem  today  at high risk of reaspiration  and decub related sepsis . last rrp was not likely due to sepsis

## 2019-07-22 NOTE — PROGRESS NOTE ADULT - SUBJECTIVE AND OBJECTIVE BOX
Patient is a 69y old  Female who presented with a chief complaint of PEG tube dislodgement, fever, leukocytosis (22 Jul 2019 09:59)      INTERVAL HPI/OVERNIGHT EVENTS:  issues with PEG bumper sliding up and PEG site leakage over weekend, alsoe reporting leaking of feeds/water from feed port when connected to feeds or instilling meds  s/p RRT overnight for hypotension- off Lasix gtts  s/p 1 unit PRBCs transfused overnight    no rectal bleeding or melena  no bleeding at PEG site    MEDICATIONS  (STANDING):  apixaban 2.5 milliGRAM(s) Oral two times a day  artificial tears (preservative free) Ophthalmic Solution 1 Drop(s) Both EYES four times a day  ascorbic acid 500 milliGRAM(s) Oral daily  BACItracin   Ointment 1 Application(s) Topical two times a day  chlorhexidine 2% Cloths 1 Application(s) Topical daily  clonazePAM  Tablet 1 milliGRAM(s) Oral at bedtime  Dakins Solution - 1/4 Strength 1 Application(s) Topical two times a day  dextrose 5%. 1000 milliLiter(s) (50 mL/Hr) IV Continuous <Continuous>  epoetin jagruti Injectable 76865 Unit(s) SubCutaneous once  famotidine    Tablet 20 milliGRAM(s) Oral daily  ferrous    sulfate Liquid 300 milliGRAM(s) Enteral Tube daily  formoterol for nebulization 20 MICROGram(s) Nebulizer two times a day  furosemide   Injectable 40 milliGRAM(s) IV Push two times a day  gabapentin   Solution 300 milliGRAM(s) Oral every 12 hours  insulin lispro (HumaLOG) corrective regimen sliding scale   SubCutaneous every 6 hours  levothyroxine Injectable 60 MICROGram(s) IV Push at bedtime  Medical Marijuana Awake Oil 2 milliLiter(s),Medical Marijuana Awake Oil 2 milliLiter(s) 2 milliLiter(s) Enteral Tube <User Schedule>  Medical Marijuana Calm Oil 2 milliLiter(s),Medical [place] Calm Oil 2 milliLiter(s) 2 milliLiter(s) Enteral Tube <User Schedule>  Medical Marijuana [place] Oil 2 milliLiter(s),Medical Marijuana [place] Oil 2 milliLiter(s) 2 milliLiter(s) Enteral Tube <User Schedule>  metoprolol tartrate 12.5 milliGRAM(s) Oral two times a day  multivitamin/minerals/iron Oral Solution (CENTRUM) 15 milliLiter(s) Oral daily  QUEtiapine 25 milliGRAM(s) Oral at bedtime  sodium chloride 0.9%. 1000 milliLiter(s) (75 mL/Hr) IV Continuous <Continuous>  tiZANidine 4 milliGRAM(s) Oral <User Schedule>  zinc sulfate 220 milliGRAM(s) Oral daily    MEDICATIONS  (PRN):  acetaminophen  Suppository .. 650 milliGRAM(s) Rectal every 6 hours PRN Temp greater or equal to 38C (100.4F), Mild Pain (1 - 3)  carboxymethylcellulose 0.5% (Preservative-Free) Ophthalmic Solution 1 Drop(s) Both EYES three times a day PRN dry eyes  glucagon  Injectable 1 milliGRAM(s) IntraMuscular once PRN Glucose LESS THAN 70 milligrams/deciliter  nystatin Powder 1 Application(s) Topical three times a day PRN under breasts      Allergies  ASA; dye contrast (Anaphylaxis)  aspirin (Short breath)  divalproex sodium (Other (Unknown))  Flowers and Plants (Short breath)  Haldol (Other (Unknown))  penicillin (Short breath; Rash)  sulfa drugs (Short breath; Rash)  vancomycin (Rash; Urticaria; Hives)  Xanax (Other (Unknown))      Review of Systems:  unable to obtain    Vital Signs Last 24 Hrs  T(C): 36.8 (22 Jul 2019 10:35), Max: 37.1 (21 Jul 2019 21:07)  T(F): 98.2 (22 Jul 2019 10:35), Max: 98.7 (21 Jul 2019 21:07)  HR: 91 (22 Jul 2019 10:35) (18 - 96)  BP: 126/82 (22 Jul 2019 10:35) (80/43 - 149/87)  BP(mean): --  RR: 18 (22 Jul 2019 10:35) (18 - 20)  SpO2: 94% (22 Jul 2019 10:35) (94% - 100%)    PHYSICAL EXAM:  Constitutional: frail elderly chronically ill appearing +severe contractures, non verbal  eyes open spouse at bedside +anasarca and muscle wasting  Neck: No JVD  Respiratory: + rhonchi , decreased BS bases  Cardiovascular: S1 and S2 tachy  Gastrointestinal: BS+, soft, +G tube  high in epigastric area  bumper noted to be at 6 cm richard. with multiple pieces of gauze under bumper. gauze removed and skin cleaned by me.  Bumper brought down to skin level at 2-3 cm richard; secondary bumper/stopper created with use of silk tape directly above end of bumper/external disc to prevent bumper from sliding up.  Cavilon applied to skin around G tube site.  feed port adaptor applied to feeding port, no further leakage  Extremities: anasarca/+edema and muscle wasting +dressings in place ++contractures  Vascular: 2+ peripheral pulses  Neurological: lethargic, no focal asymmetry  Psychiatric: non verbal   Skin: anicteric  +skin dressings and heel pad cushions in place  multiple decubiti (wound care following)    LABS:                        9.3    20.6  )-----------( 353      ( 22 Jul 2019 11:00 )             26.6   s/p 1 unit PRBCs  Hemoglobin: 7.1 g/dL <L> [11.5 - 15.5] (07-22 @ 02:36)  Hemoglobin: 7.1 g/dL <L> [11.5 - 15.5] (07-21 @ 10:18)      07-22    136  |  94<L>  |  128<H>  ----------------------------<  106<H>  3.5   |  19<L>  |  3.45<H>    Ca    7.9<L>      22 Jul 2019 02:36  Phos  7.5     07-22  Mg     2.1     07-22    TPro  4.7<L>  /  Alb  1.9<L>  /  TBili  0.2  /  DBili  x   /  AST  11  /  ALT  7<L>  /  AlkPhos  72  07-22    PT/INR - ( 22 Jul 2019 02:36 )   PT: 16.4 sec;   INR: 1.42 ratio    PTT - ( 22 Jul 2019 02:36 )  PTT:37.9 sec        RADIOLOGY & ADDITIONAL TESTS:

## 2019-07-22 NOTE — PROVIDER CONTACT NOTE (CRITICAL VALUE NOTIFICATION) - SITUATION
Pt admitted with dislodged gastrostomy tube, PNA, and anemia. Current Hct 20.8.
Pt admitted with anemia, PNA, and dislodged gastrostomy tube; on IV vanco. Current trough level (pre 4th dose) is 33.6.
Pt admitted with dislodged gastrostomy tube, PNA, and anemia.
critical high aPTT 176.1
critical value for hemoglobin hematocrit from ABG of  6.9/22
critical value for hemoglobin/hematocrit from CBC of 7.5/20.9
critically low hematocrit
h/h 6.9/21.6
Admit Dx: PNA, Anemia, PEG dislodgement

## 2019-07-23 LAB
ANION GAP SERPL CALC-SCNC: 22 MMOL/L — HIGH (ref 5–17)
BUN SERPL-MCNC: 125 MG/DL — HIGH (ref 7–23)
CALCIUM SERPL-MCNC: 8.2 MG/DL — LOW (ref 8.4–10.5)
CHLORIDE SERPL-SCNC: 97 MMOL/L — SIGNIFICANT CHANGE UP (ref 96–108)
CO2 SERPL-SCNC: 19 MMOL/L — LOW (ref 22–31)
CREAT SERPL-MCNC: 3.39 MG/DL — HIGH (ref 0.5–1.3)
GLUCOSE BLDC GLUCOMTR-MCNC: 100 MG/DL — HIGH (ref 70–99)
GLUCOSE BLDC GLUCOMTR-MCNC: 122 MG/DL — HIGH (ref 70–99)
GLUCOSE BLDC GLUCOMTR-MCNC: 285 MG/DL — HIGH (ref 70–99)
GLUCOSE SERPL-MCNC: 90 MG/DL — SIGNIFICANT CHANGE UP (ref 70–99)
HCT VFR BLD CALC: 25.3 % — LOW (ref 34.5–45)
HGB BLD-MCNC: 8.1 G/DL — LOW (ref 11.5–15.5)
MCHC RBC-ENTMCNC: 28.9 PG — SIGNIFICANT CHANGE UP (ref 27–34)
MCHC RBC-ENTMCNC: 32 GM/DL — SIGNIFICANT CHANGE UP (ref 32–36)
MCV RBC AUTO: 90.4 FL — SIGNIFICANT CHANGE UP (ref 80–100)
PLATELET # BLD AUTO: 334 K/UL — SIGNIFICANT CHANGE UP (ref 150–400)
POTASSIUM SERPL-MCNC: 3.6 MMOL/L — SIGNIFICANT CHANGE UP (ref 3.5–5.3)
POTASSIUM SERPL-SCNC: 3.6 MMOL/L — SIGNIFICANT CHANGE UP (ref 3.5–5.3)
RBC # BLD: 2.8 M/UL — LOW (ref 3.8–5.2)
RBC # FLD: 15.3 % — HIGH (ref 10.3–14.5)
SODIUM SERPL-SCNC: 138 MMOL/L — SIGNIFICANT CHANGE UP (ref 135–145)
WBC # BLD: 16.44 K/UL — HIGH (ref 3.8–10.5)
WBC # FLD AUTO: 16.44 K/UL — HIGH (ref 3.8–10.5)

## 2019-07-23 PROCEDURE — 99232 SBSQ HOSP IP/OBS MODERATE 35: CPT

## 2019-07-23 PROCEDURE — 71045 X-RAY EXAM CHEST 1 VIEW: CPT | Mod: 26

## 2019-07-23 RX ORDER — ERYTHROPOIETIN 10000 [IU]/ML
10000 INJECTION, SOLUTION INTRAVENOUS; SUBCUTANEOUS ONCE
Refills: 0 | Status: COMPLETED | OUTPATIENT
Start: 2019-07-23 | End: 2019-07-23

## 2019-07-23 RX ADMIN — APIXABAN 2.5 MILLIGRAM(S): 2.5 TABLET, FILM COATED ORAL at 17:01

## 2019-07-23 RX ADMIN — Medication 1 APPLICATION(S): at 17:01

## 2019-07-23 RX ADMIN — Medication 60 MICROGRAM(S): at 22:17

## 2019-07-23 RX ADMIN — ERYTHROPOIETIN 10000 UNIT(S): 10000 INJECTION, SOLUTION INTRAVENOUS; SUBCUTANEOUS at 14:33

## 2019-07-23 RX ADMIN — TIZANIDINE 4 MILLIGRAM(S): 4 TABLET ORAL at 09:00

## 2019-07-23 RX ADMIN — CHLORHEXIDINE GLUCONATE 1 APPLICATION(S): 213 SOLUTION TOPICAL at 11:42

## 2019-07-23 RX ADMIN — Medication 500 MILLIGRAM(S): at 11:44

## 2019-07-23 RX ADMIN — Medication 20 MICROGRAM(S): at 17:00

## 2019-07-23 RX ADMIN — Medication 12.5 MILLIGRAM(S): at 17:00

## 2019-07-23 RX ADMIN — Medication 3: at 18:47

## 2019-07-23 RX ADMIN — ZINC SULFATE TAB 220 MG (50 MG ZINC EQUIVALENT) 220 MILLIGRAM(S): 220 (50 ZN) TAB at 11:44

## 2019-07-23 RX ADMIN — FAMOTIDINE 20 MILLIGRAM(S): 10 INJECTION INTRAVENOUS at 11:44

## 2019-07-23 RX ADMIN — Medication 5 MILLILITER(S): at 06:08

## 2019-07-23 RX ADMIN — QUETIAPINE FUMARATE 25 MILLIGRAM(S): 200 TABLET, FILM COATED ORAL at 22:11

## 2019-07-23 RX ADMIN — Medication 300 MILLIGRAM(S): at 11:44

## 2019-07-23 RX ADMIN — Medication 1 DROP(S): at 17:02

## 2019-07-23 RX ADMIN — TIZANIDINE 4 MILLIGRAM(S): 4 TABLET ORAL at 21:19

## 2019-07-23 RX ADMIN — NYSTATIN CREAM 1 APPLICATION(S): 100000 CREAM TOPICAL at 11:47

## 2019-07-23 RX ADMIN — GABAPENTIN 300 MILLIGRAM(S): 400 CAPSULE ORAL at 21:19

## 2019-07-23 RX ADMIN — Medication 1 MILLIGRAM(S): at 22:11

## 2019-07-23 RX ADMIN — Medication 15 MILLILITER(S): at 17:01

## 2019-07-23 RX ADMIN — APIXABAN 2.5 MILLIGRAM(S): 2.5 TABLET, FILM COATED ORAL at 06:09

## 2019-07-23 RX ADMIN — Medication 1 APPLICATION(S): at 06:09

## 2019-07-23 RX ADMIN — GABAPENTIN 300 MILLIGRAM(S): 400 CAPSULE ORAL at 09:00

## 2019-07-23 RX ADMIN — Medication 12.5 MILLIGRAM(S): at 06:07

## 2019-07-23 RX ADMIN — Medication 25 MILLIGRAM(S): at 06:09

## 2019-07-23 RX ADMIN — Medication 1 APPLICATION(S): at 17:00

## 2019-07-23 RX ADMIN — Medication 20 MICROGRAM(S): at 06:41

## 2019-07-23 RX ADMIN — Medication 1 DROP(S): at 11:46

## 2019-07-23 RX ADMIN — Medication 5 MILLILITER(S): at 12:56

## 2019-07-23 NOTE — PROGRESS NOTE ADULT - SUBJECTIVE AND OBJECTIVE BOX
---___---___---___---___---___---___ ---___---___---___---___---___---___---___---___---                  M E D I C A L   A T T E N D I N G   P R O G R E S S   N O T E  ---___---___---___---___---___---___ ---___---___---___---___---___---___---___---___---        ================================================    ++CHIEF COMPLAINT:   Patient is a 69y old  Female who presents with a chief complaint of PEG tube dislodgement, fever, leukocytosis (2019 11:42)    libia and trending labs . patient bedbound     ---___---___---___---___---___---  PAST MEDICAL / Surgical  HISTORY:  PAST MEDICAL & SURGICAL HISTORY:  Type 2 diabetes mellitus  Dementia  DVT, lower extremity  CVA (cerebral vascular accident)  Fatty pancreas  PNA (pneumonia)  Pulmonary HTN  IGT (impaired glucose tolerance)  Ulcerative colitis  Acid reflux  Anxiety  Depression  Mouth sores  HLD (hyperlipidemia)  Asthma  S/P percutaneous endoscopic gastrostomy (PEG) tube placement: for dysphagia  Humeral head fracture  H/O: hysterectomy  H/O cataract extraction, left  History of knee replacement      ---___---___---___---___---___---  FAMILY HISTORY:   FAMILY HISTORY:  Family history of dementia (Grandparent)  Family history of colon cancer (Grandparent)        ---___---___---___---___---___---  ALLERGIES:   Allergies    ASA; dye contrast (Anaphylaxis)  aspirin (Short breath)  divalproex sodium (Other (Unknown))  Flowers and Plants (Short breath)  Haldol (Other (Unknown))  penicillin (Short breath; Rash)  sulfa drugs (Short breath; Rash)  vancomycin (Rash; Urticaria; Hives)  Xanax (Other (Unknown))    Intolerances        ---___---___---___---___---___---  MEDICATIONS:  MEDICATIONS  (STANDING):  apixaban 2.5 milliGRAM(s) Oral two times a day  artificial tears (preservative free) Ophthalmic Solution 1 Drop(s) Both EYES four times a day  ascorbic acid 500 milliGRAM(s) Oral daily  BACItracin   Ointment 1 Application(s) Topical two times a day  chlorhexidine 2% Cloths 1 Application(s) Topical daily  clonazePAM  Tablet 1 milliGRAM(s) Oral at bedtime  Dakins Solution - 1/4 Strength 1 Application(s) Topical two times a day  dextrose 5%. 1000 milliLiter(s) (50 mL/Hr) IV Continuous <Continuous>  epoetin jagruti Injectable 53558 Unit(s) SubCutaneous once  famotidine    Tablet 20 milliGRAM(s) Oral daily  ferrous    sulfate Liquid 300 milliGRAM(s) Enteral Tube daily  formoterol for nebulization 20 MICROGram(s) Nebulizer two times a day  gabapentin   Solution 300 milliGRAM(s) Oral every 12 hours  hydrocortisone sodium succinate Injectable 25 milliGRAM(s) IV Push daily  insulin lispro (HumaLOG) corrective regimen sliding scale   SubCutaneous every 6 hours  levothyroxine Injectable 60 MICROGram(s) IV Push at bedtime  Medical Marijuana Awake Oil 2 milliLiter(s),Medical Marijuana Awake Oil 2 milliLiter(s) 2 milliLiter(s) Enteral Tube <User Schedule>  Medical Marijuana Calm Oil 2 milliLiter(s),Medical Marinjuana Calm Oil 2 milliLiter(s) 2 milliLiter(s) Enteral Tube <User Schedule>  Medical Marijuana [place] Oil 2 milliLiter(s),Medical Marijuana [place] Oil 2 milliLiter(s) 2 milliLiter(s) Enteral Tube <User Schedule>  metoprolol tartrate 12.5 milliGRAM(s) Oral two times a day  multivitamin/minerals/iron Oral Solution (CENTRUM) 15 milliLiter(s) Oral daily  QUEtiapine 25 milliGRAM(s) Oral at bedtime  sodium chloride 0.9%. 1000 milliLiter(s) (75 mL/Hr) IV Continuous <Continuous>  tiZANidine 4 milliGRAM(s) Oral <User Schedule>  zinc sulfate 220 milliGRAM(s) Oral daily    MEDICATIONS  (PRN):  acetaminophen  Suppository .. 650 milliGRAM(s) Rectal every 6 hours PRN Temp greater or equal to 38C (100.4F), Mild Pain (1 - 3)  carboxymethylcellulose 0.5% (Preservative-Free) Ophthalmic Solution 1 Drop(s) Both EYES three times a day PRN dry eyes  glucagon  Injectable 1 milliGRAM(s) IntraMuscular once PRN Glucose LESS THAN 70 milligrams/deciliter  nystatin Powder 1 Application(s) Topical three times a day PRN under breasts      ---___---___---___---___---___---  REVIEW OF SYSTEM:  unable to obtain   ---___---___---___---___---___---  VITAL SIGNS:  69y , CAPILLARY BLOOD GLUCOSE      POCT Blood Glucose.: 122 mg/dL (2019 12:09)    T(C): 36.5 (19 @ 09:00), Max: 36.8 (19 @ 06:00)  HR: 85 (19 @ 09:00) (74 - 101)  BP: 122/80 (19 @ 09:00) (98/61 - 123/76)  RR: 18 (19 @ 09:00) (18 - 18)  SpO2: 100% (19 @ 09:00) (95% - 100%)  ---___---___---___---___---___---  PHYSICAL EXAM:    GEN: A&O X 0 , NAD , comfortable  HEENT: NCAT, PERRL, MMM, hearing intact  Neck: supple , no JVD  CVS: S1S2 , regular , No M/R/G appreciated  PULM: CTA B/L,  no W/R/R appreciated  ABD.: soft. non tender, non distended,  bowel sounds present  Extrem: intact pulses , no edema   Derm: No rash , no ecchymoses  PSYCH : normal mood,  no delusion not anxious     ---___---___---___---___---___---            LAB AND IMAGIN.1    16.44 )-----------( 334      ( 2019 09:08 )             25.3               07-23    138  |  97  |  125<H>  ----------------------------<  90  3.6   |  19<L>  |  3.39<H>    Ca    8.2<L>      2019 06:33  Phos  7.5     07  Mg     2.1     07-    TPro  4.7<L>  /  Alb  1.9<L>  /  TBili  0.2  /  DBili  x   /  AST  11  /  ALT  7<L>  /  AlkPhos  72  07-22    PT/INR - ( 2019 02:36 )   PT: 16.4 sec;   INR: 1.42 ratio         PTT - ( 2019 02:36 )  PTT:37.9 sec                      ABG - ( 2019 01:21 )  pH, Arterial: 7.42  pH, Blood: x     /  pCO2: 32    /  pO2: 152   / HCO3: 21    / Base Excess: -3.0  /  SaO2: 99                    [All pertinent / recent Imaging reviewed]         ---___---___---___---___---___---___ ---___---___---___---___---                         A S S E S S M E N T   A N D   P L A N :      HEALTH ISSUES - PROBLEM Dx:  libia 	trending down now  renal following   chronic pain contiue meds  dysphagia on peg feeds  asthma on budesonide   chf / tachycardia continue beta blocker   sacral decubitus on wound car e    overall prognosis  poor           -GI/DVT Prophylaxis.    --------------------------------------------  Case discussed with   Education given on   ___________________________  Thank you,  Deniz Schraderad  [phone]

## 2019-07-23 NOTE — PROGRESS NOTE ADULT - SUBJECTIVE AND OBJECTIVE BOX
Patient is a 69y old  Female who presented with a chief complaint of PEG tube dislodgement, fever, leukocytosis (23 Jul 2019 13:24)      INTERVAL HPI/OVERNIGHT EVENTS:  prior events noted  PEG feeds on hold 2/2 concerns for aspiration  no bleeding or leakage from PEG site      MEDICATIONS  (STANDING):  apixaban 2.5 milliGRAM(s) Oral two times a day  artificial tears (preservative free) Ophthalmic Solution 1 Drop(s) Both EYES four times a day  ascorbic acid 500 milliGRAM(s) Oral daily  BACItracin   Ointment 1 Application(s) Topical two times a day  chlorhexidine 2% Cloths 1 Application(s) Topical daily  clonazePAM  Tablet 1 milliGRAM(s) Oral at bedtime  Dakins Solution - 1/4 Strength 1 Application(s) Topical two times a day  dextrose 5%. 1000 milliLiter(s) (50 mL/Hr) IV Continuous <Continuous>  epoetin jagruti Injectable 88931 Unit(s) SubCutaneous once  famotidine    Tablet 20 milliGRAM(s) Oral daily  ferrous    sulfate Liquid 300 milliGRAM(s) Enteral Tube daily  formoterol for nebulization 20 MICROGram(s) Nebulizer two times a day  gabapentin   Solution 300 milliGRAM(s) Oral every 12 hours  hydrocortisone sodium succinate Injectable 25 milliGRAM(s) IV Push daily  insulin lispro (HumaLOG) corrective regimen sliding scale   SubCutaneous every 6 hours  levothyroxine Injectable 60 MICROGram(s) IV Push at bedtime  Medical Marijuana Awake Oil 2 milliLiter(s),Medical Marijuana Awake Oil 2 milliLiter(s) 2 milliLiter(s) Enteral Tube <User Schedule>  Medical Marijuana Calm Oil 2 milliLiter(s),Medical Marinjuana Calm Oil 2 milliLiter(s) 2 milliLiter(s) Enteral Tube <User Schedule>  Medical Marijuana [place] Oil 2 milliLiter(s),Medical Marijuana [place] Oil 2 milliLiter(s) 2 milliLiter(s) Enteral Tube <User Schedule>  metoprolol tartrate 12.5 milliGRAM(s) Oral two times a day  multivitamin/minerals/iron Oral Solution (CENTRUM) 15 milliLiter(s) Oral daily  QUEtiapine 25 milliGRAM(s) Oral at bedtime  sodium chloride 0.9%. 1000 milliLiter(s) (75 mL/Hr) IV Continuous <Continuous>  tiZANidine 4 milliGRAM(s) Oral <User Schedule>  zinc sulfate 220 milliGRAM(s) Oral daily    MEDICATIONS  (PRN):  acetaminophen  Suppository .. 650 milliGRAM(s) Rectal every 6 hours PRN Temp greater or equal to 38C (100.4F), Mild Pain (1 - 3)  carboxymethylcellulose 0.5% (Preservative-Free) Ophthalmic Solution 1 Drop(s) Both EYES three times a day PRN dry eyes  glucagon  Injectable 1 milliGRAM(s) IntraMuscular once PRN Glucose LESS THAN 70 milligrams/deciliter  nystatin Powder 1 Application(s) Topical three times a day PRN under breasts      Allergies  ASA; dye contrast (Anaphylaxis)  aspirin (Short breath)  divalproex sodium (Other (Unknown))  Flowers and Plants (Short breath)  Haldol (Other (Unknown))  penicillin (Short breath; Rash)  sulfa drugs (Short breath; Rash)  vancomycin (Rash; Urticaria; Hives)  Xanax (Other (Unknown))    Review of Systems:  unable to obtain    Vital Signs Last 24 Hrs  T(C): 36.5 (23 Jul 2019 09:00), Max: 36.8 (23 Jul 2019 06:00)  T(F): 97.7 (23 Jul 2019 09:00), Max: 98.2 (23 Jul 2019 06:00)  HR: 85 (23 Jul 2019 09:00) (74 - 101)  BP: 122/80 (23 Jul 2019 09:00) (98/61 - 123/76)  BP(mean): --  RR: 18 (23 Jul 2019 09:00) (18 - 18)  SpO2: 100% (23 Jul 2019 09:00) (95% - 100%)    PHYSICAL EXAM:  Constitutional: frail elderly chronically ill appearing +severe contractures, non verbal  eyes open spouse at bedside +anasarca and muscle wasting  Neck: No JVD  Respiratory: + rhonchi , decreased BS bases  Cardiovascular: S1 and S2 regular  Gastrointestinal: BS+, soft, +G tube  high in epigastric area  bumper noted to be at 2 cm richard. 1 piece of gauze under bumper.  Extremities: anasarca/+edema and muscle wasting +dressings in place ++contractures  Vascular: 2+ peripheral pulses  Neurological: lethargic, no focal asymmetry  Psychiatric: non verbal   Skin: anicteric  +skin dressings and heel pad cushions in place  multiple decubiti (wound care following)    LABS:                        8.1    16.44 )-----------( 334      ( 23 Jul 2019 09:08 )             25.3     07-23    138  |  97  |  125<H>  ----------------------------<  90  3.6   |  19<L>  |  3.39<H>    Ca    8.2<L>      23 Jul 2019 06:33  Phos  7.5     07-22  Mg     2.1     07-22    TPro  4.7<L>  /  Alb  1.9<L>  /  TBili  0.2  /  DBili  x   /  AST  11  /  ALT  7<L>  /  AlkPhos  72  07-22    PT/INR - ( 22 Jul 2019 02:36 )   PT: 16.4 sec;   INR: 1.42 ratio    PTT - ( 22 Jul 2019 02:36 )  PTT:37.9 sec           RADIOLOGY & ADDITIONAL TESTS:

## 2019-07-23 NOTE — PROGRESS NOTE ADULT - ASSESSMENT
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 since admission, went to IR to have PEG replaced.  TRANG     1. TRANG likely multifactorial with Creatinine getting slightly  better   2.  Anemia; Hgb low; initial Hgb <7; repeat slightly better though <goal   3.  SP Hypotension     RECOMMEND:  - I suspect feeds to restart soon?     - Lasix is now off today and will re-evaluate in am     - Maintain gavin catheter for today but likely dc in am  - Strict I & Os  -  Procrit 10000 x 1

## 2019-07-23 NOTE — PROGRESS NOTE ADULT - SUBJECTIVE AND OBJECTIVE BOX
infectious diseases progress note:    Patient is a 69y old  Female who presents with a chief complaint of PEG tube dislodgement, fever, leukocytosis (22 Jul 2019 18:44)        Gastrostomy malfunction             Allergies    ASA; dye contrast (Anaphylaxis)  aspirin (Short breath)  divalproex sodium (Other (Unknown))  Flowers and Plants (Short breath)  Haldol (Other (Unknown))  penicillin (Short breath; Rash)  sulfa drugs (Short breath; Rash)  vancomycin (Rash; Urticaria; Hives)  Xanax (Other (Unknown))    Intolerances        ANTIBIOTICS/RELEVANT:  antimicrobials    immunologic:    OTHER:  acetaminophen  Suppository .. 650 milliGRAM(s) Rectal every 6 hours PRN  acetylcysteine 10%  Inhalation 5 milliLiter(s) Inhalation three times a day  apixaban 2.5 milliGRAM(s) Oral two times a day  artificial tears (preservative free) Ophthalmic Solution 1 Drop(s) Both EYES four times a day  ascorbic acid 500 milliGRAM(s) Oral daily  BACItracin   Ointment 1 Application(s) Topical two times a day  carboxymethylcellulose 0.5% (Preservative-Free) Ophthalmic Solution 1 Drop(s) Both EYES three times a day PRN  chlorhexidine 2% Cloths 1 Application(s) Topical daily  clonazePAM  Tablet 1 milliGRAM(s) Oral at bedtime  Dakins Solution - 1/4 Strength 1 Application(s) Topical two times a day  dextrose 5%. 1000 milliLiter(s) IV Continuous <Continuous>  famotidine    Tablet 20 milliGRAM(s) Oral daily  ferrous    sulfate Liquid 300 milliGRAM(s) Enteral Tube daily  formoterol for nebulization 20 MICROGram(s) Nebulizer two times a day  gabapentin   Solution 300 milliGRAM(s) Oral every 12 hours  glucagon  Injectable 1 milliGRAM(s) IntraMuscular once PRN  hydrocortisone sodium succinate Injectable 25 milliGRAM(s) IV Push daily  insulin lispro (HumaLOG) corrective regimen sliding scale   SubCutaneous every 6 hours  levothyroxine Injectable 60 MICROGram(s) IV Push at bedtime  Medical Marijuana Awake Oil 2 milliLiter(s),Medical Marijuana Awake Oil 2 milliLiter(s) 2 milliLiter(s) Enteral Tube <User Schedule>  Medical Marijuana Calm Oil 2 milliLiter(s),Medical Marinjuana Calm Oil 2 milliLiter(s) 2 milliLiter(s) Enteral Tube <User Schedule>  Medical Marijuana [place] Oil 2 milliLiter(s),Medical Marijuana [place] Oil 2 milliLiter(s) 2 milliLiter(s) Enteral Tube <User Schedule>  metoprolol tartrate 12.5 milliGRAM(s) Oral two times a day  multivitamin/minerals/iron Oral Solution (CENTRUM) 15 milliLiter(s) Oral daily  nystatin Powder 1 Application(s) Topical three times a day PRN  QUEtiapine 25 milliGRAM(s) Oral at bedtime  sodium chloride 0.9%. 1000 milliLiter(s) IV Continuous <Continuous>  tiZANidine 4 milliGRAM(s) Oral <User Schedule>  zinc sulfate 220 milliGRAM(s) Oral daily      Objective:  Vital Signs Last 24 Hrs  T(C): 36.8 (23 Jul 2019 06:00), Max: 36.8 (22 Jul 2019 10:35)  T(F): 98.2 (23 Jul 2019 06:00), Max: 98.2 (22 Jul 2019 10:35)  HR: 87 (23 Jul 2019 06:00) (74 - 101)  BP: 103/57 (23 Jul 2019 06:00) (90/56 - 126/82)  BP(mean): --  RR: 18 (23 Jul 2019 06:00) (18 - 20)  SpO2: 99% (23 Jul 2019 06:00) (94% - 100%)          Eyes:JACQUELIN, EOMI  Ear/Nose/Throat: no oral lesion, no sinus tenderness on percussion	  Neck:no JVD, no lymphadenopathy, supple  Respiratory: CTA maryjane     Gastrointestinal:soft, (+) BS, no HSM  Extremities:no e/e/c        LABS:                        9.3    20.6  )-----------( 353      ( 22 Jul 2019 11:00 )             26.6     07-23    138  |  97  |  125<H>  ----------------------------<  90  3.6   |  19<L>  |  3.39<H>    Ca    8.2<L>      23 Jul 2019 06:33  Phos  7.5     07-22  Mg     2.1     07-22    TPro  4.7<L>  /  Alb  1.9<L>  /  TBili  0.2  /  DBili  x   /  AST  11  /  ALT  7<L>  /  AlkPhos  72  07-22    PT/INR - ( 22 Jul 2019 02:36 )   PT: 16.4 sec;   INR: 1.42 ratio         PTT - ( 22 Jul 2019 02:36 )  PTT:37.9 sec        MICROBIOLOGY:    RECENT CULTURES:        RESPIRATORY CULTURES:              RADIOLOGY & ADDITIONAL STUDIES:        Pager [phone]  After 5 pm/weekends or if no response :[phone]

## 2019-07-23 NOTE — PROGRESS NOTE ADULT - ASSESSMENT
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  Fever and leukocytosis now. Abnormal CT chest.    , patient found with hypoxia and AMS suspect from aspiration event.            aspiration/pna        unresponsive today  reculture for signs  of infection        completed meropenem   at high risk of reaspiration  and decub related sepsis . last rrp was not likely due to sepsis  continues to do poorly.

## 2019-07-23 NOTE — PROGRESS NOTE ADULT - SUBJECTIVE AND OBJECTIVE BOX
NEPHROLOGY-[facility] [phone]        Patient seen and examined in bed.  She was about the same   Urinating well         MEDICATIONS  (STANDING):  acetylcysteine 10%  Inhalation 5 milliLiter(s) Inhalation three times a day  apixaban 2.5 milliGRAM(s) Oral two times a day  artificial tears (preservative free) Ophthalmic Solution 1 Drop(s) Both EYES four times a day  ascorbic acid 500 milliGRAM(s) Oral daily  BACItracin   Ointment 1 Application(s) Topical two times a day  chlorhexidine 2% Cloths 1 Application(s) Topical daily  clonazePAM  Tablet 1 milliGRAM(s) Oral at bedtime  Dakins Solution - 1/4 Strength 1 Application(s) Topical two times a day  dextrose 5%. 1000 milliLiter(s) (50 mL/Hr) IV Continuous <Continuous>  famotidine    Tablet 20 milliGRAM(s) Oral daily  ferrous    sulfate Liquid 300 milliGRAM(s) Enteral Tube daily  formoterol for nebulization 20 MICROGram(s) Nebulizer two times a day  gabapentin   Solution 300 milliGRAM(s) Oral every 12 hours  hydrocortisone sodium succinate Injectable 25 milliGRAM(s) IV Push daily  insulin lispro (HumaLOG) corrective regimen sliding scale   SubCutaneous every 6 hours  levothyroxine Injectable 60 MICROGram(s) IV Push at bedtime  Medical Marijuana Awake Oil 2 milliLiter(s),Medical Marijuana Awake Oil 2 milliLiter(s) 2 milliLiter(s) Enteral Tube <User Schedule>  Medical Marijuana Calm Oil 2 milliLiter(s),Medical Marinjuana Calm Oil 2 milliLiter(s) 2 milliLiter(s) Enteral Tube <User Schedule>  Medical Marijuana [place] Oil 2 milliLiter(s),Medical Marijuana [place] Oil 2 milliLiter(s) 2 milliLiter(s) Enteral Tube <User Schedule>  metoprolol tartrate 12.5 milliGRAM(s) Oral two times a day  multivitamin/minerals/iron Oral Solution (CENTRUM) 15 milliLiter(s) Oral daily  QUEtiapine 25 milliGRAM(s) Oral at bedtime  sodium chloride 0.9%. 1000 milliLiter(s) (75 mL/Hr) IV Continuous <Continuous>  tiZANidine 4 milliGRAM(s) Oral <User Schedule>  zinc sulfate 220 milliGRAM(s) Oral daily      VITAL:  T(C): , Max: 36.8 (07-23-19 @ 06:00)  T(F): , Max: 98.2 (07-23-19 @ 06:00)  HR: 85 (07-23-19 @ 09:00)  BP: 122/80 (07-23-19 @ 09:00)  BP(mean): --  RR: 18 (07-23-19 @ 09:00)  SpO2: 100% (07-23-19 @ 09:00)  Wt(kg): --    I and O's:    07-22 @ 07:01  -  07-23 @ 07:00  --------------------------------------------------------  IN: 290 mL / OUT: 1170 mL / NET: -880 mL    07-23 @ 07:01  -  07-23 @ 11:42  --------------------------------------------------------  IN: 0 mL / OUT: 0 mL / NET: 0 mL          PHYSICAL EXAM:    Constitutional: NAD; cachetic   Neck:  could not evaluate   Respiratory: poor effort  Cardiovascular: S1 and S2  Gastrointestinal: BS+, soft, NT/ND  Extremities: No peripheral edema  Neurological: unable   : +  Blackman  Skin: No rashes  Access: Not applicable    LABS:                        8.1    16.44 )-----------( 334      ( 23 Jul 2019 09:08 )             25.3     07-23    138  |  97  |  125<H>  ----------------------------<  90  3.6   |  19<L>  |  3.39<H>    Ca    8.2<L>      23 Jul 2019 06:33  Phos  7.5     07-22  Mg     2.1     07-22    TPro  4.7<L>  /  Alb  1.9<L>  /  TBili  0.2  /  DBili  x   /  AST  11  /  ALT  7<L>  /  AlkPhos  72  07-22          Urine Studies:          RADIOLOGY & ADDITIONAL STUDIES:

## 2019-07-23 NOTE — PROGRESS NOTE ADULT - ASSESSMENT
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 Since admission, went to IR to have PEG replaced (6/25/19)    s/p RRT on 7/13 for hypotension in the SBP 50-60s and hypoxia in the 80s, admitted to MICU for shock requiring IV pressor support. Now stable off pressors, on NC. On stress steroids dosing  DVT on AC  s/p RRT for hypotension 7/21- 7/22 overnight  Anemia - without overt/brisk GI bleed.     RECS:  -keep G tube bumper at ~2-3 cm richard on G tube.  -G tube feeds on hold for now.  can consider conversion to GJ tube but there remains a risk of aspiration on secretions as well as risk of J tube clogging.  recommend discussion regarding goals of care  -wound care  -ID following  -monitor BMs  -Renal following  -Continue Pepcid  -Monitor BMs  -epogen as per Renal  -steroids per primary team    Adam Miller PA-C    [facility]  [phone]  After hours and weekend coverage [phone]

## 2019-07-23 NOTE — PROGRESS NOTE ADULT - SUBJECTIVE AND OBJECTIVE BOX
PULMONARY PROGRESS NOTE    MYRIAM HEATH  MRN-[id number]    Patient is a 69y old  Female who presents with a chief complaint of PEG tube dislodgement, fever, leukocytosis (23 Jul 2019 07:58)      HPI:  -  -    ROS:   -    ACTIVE MEDICATION LIST:  MEDICATIONS  (STANDING):  acetylcysteine 10%  Inhalation 5 milliLiter(s) Inhalation three times a day  apixaban 2.5 milliGRAM(s) Oral two times a day  artificial tears (preservative free) Ophthalmic Solution 1 Drop(s) Both EYES four times a day  ascorbic acid 500 milliGRAM(s) Oral daily  BACItracin   Ointment 1 Application(s) Topical two times a day  chlorhexidine 2% Cloths 1 Application(s) Topical daily  clonazePAM  Tablet 1 milliGRAM(s) Oral at bedtime  Dakins Solution - 1/4 Strength 1 Application(s) Topical two times a day  dextrose 5%. 1000 milliLiter(s) (50 mL/Hr) IV Continuous <Continuous>  famotidine    Tablet 20 milliGRAM(s) Oral daily  ferrous    sulfate Liquid 300 milliGRAM(s) Enteral Tube daily  formoterol for nebulization 20 MICROGram(s) Nebulizer two times a day  gabapentin   Solution 300 milliGRAM(s) Oral every 12 hours  hydrocortisone sodium succinate Injectable 25 milliGRAM(s) IV Push daily  insulin lispro (HumaLOG) corrective regimen sliding scale   SubCutaneous every 6 hours  levothyroxine Injectable 60 MICROGram(s) IV Push at bedtime  Medical Marijuana Awake Oil 2 milliLiter(s),Medical Marijuana Awake Oil 2 milliLiter(s) 2 milliLiter(s) Enteral Tube <User Schedule>  Medical Marijuana Calm Oil 2 milliLiter(s),Medical [place]ana Calm Oil 2 milliLiter(s) 2 milliLiter(s) Enteral Tube <User Schedule>  Medical Marijuana [place] Oil 2 milliLiter(s),Medical Marijuana [place] Oil 2 milliLiter(s) 2 milliLiter(s) Enteral Tube <User Schedule>  metoprolol tartrate 12.5 milliGRAM(s) Oral two times a day  multivitamin/minerals/iron Oral Solution (CENTRUM) 15 milliLiter(s) Oral daily  QUEtiapine 25 milliGRAM(s) Oral at bedtime  sodium chloride 0.9%. 1000 milliLiter(s) (75 mL/Hr) IV Continuous <Continuous>  tiZANidine 4 milliGRAM(s) Oral <User Schedule>  zinc sulfate 220 milliGRAM(s) Oral daily    MEDICATIONS  (PRN):  acetaminophen  Suppository .. 650 milliGRAM(s) Rectal every 6 hours PRN Temp greater or equal to 38C (100.4F), Mild Pain (1 - 3)  carboxymethylcellulose 0.5% (Preservative-Free) Ophthalmic Solution 1 Drop(s) Both EYES three times a day PRN dry eyes  glucagon  Injectable 1 milliGRAM(s) IntraMuscular once PRN Glucose LESS THAN 70 milligrams/deciliter  nystatin Powder 1 Application(s) Topical three times a day PRN under breasts      EXAM:  Vital Signs Last 24 Hrs  T(C): 36.8 (23 Jul 2019 06:00), Max: 36.8 (22 Jul 2019 10:35)  T(F): 98.2 (23 Jul 2019 06:00), Max: 98.2 (22 Jul 2019 10:35)  HR: 87 (23 Jul 2019 06:00) (74 - 101)  BP: 103/57 (23 Jul 2019 06:00) (90/56 - 126/82)  BP(mean): --  RR: 18 (23 Jul 2019 06:00) (18 - 20)  SpO2: 99% (23 Jul 2019 06:00) (94% - 100%)    GENERAL: The patient is awake and alert in no apparent distress.     LUNGS: Clear to auscultation without wheezing, rales or rhonchi; respirations unlabored    HEART: Regular rate and rhythm without murmur.                            9.3    20.6  )-----------( 353      ( 22 Jul 2019 11:00 )             26.6       07-23    138  |  97  |  125<H>  ----------------------------<  90  3.6   |  19<L>  |  3.39<H>    Ca    8.2<L>      23 Jul 2019 06:33  Phos  7.5     07-22  Mg     2.1     07-22    TPro  4.7<L>  /  Alb  1.9<L>  /  TBili  0.2  /  DBili  x   /  AST  11  /  ALT  7<L>  /  AlkPhos  72  07-22        PROBLEM LIST:  69y Female with HEALTH ISSUES - PROBLEM Dx:  Fever: Fever  Type 2 diabetes mellitus without complication, without long-term current use of insulin:   Make sure you get your HgA1c checked every three months.  If you take oral diabetes medications, check your blood glucose two times a day.  If you take insulin, check your blood glucose before meals and at bedtime.  It&#x27;s important not to skip any meals.  Keep a log of your blood glucose results and always take it with you to your doctor appointments.  Keep a list of your current medications including injectables and over the counter medications and bring this medication list with you to all your doctor appointments.  If you have not seen your ophthalmologist this year call for appointment.  Check your feet daily for redness, sores, or openings. Do not self treat. If no improvement in two days call your primary care physician for an appointment.  Low blood sugar (hypoglycemia) is a blood sugar below 70mg/dl. Check your blood sugar if you feel signs/symptoms of hypoglycemia. If your blood sugar is below 70 take 15 grams of carbohydrates (ex 4 oz of apple juice, 3-4 glucose tablets, or 4-6 oz of regular soda) wait 15 minutes and repeat blood sugar to make sure it comes up above 70.  If your blood sugar is above 70 and you are due for a meal, have a meal.  If you are not due for a meal have a snack.  This snack helps keeps your blood sugar at a safe range.    Pressure injury of skin of sacral region, unspecified injury stage: Continue with wound care as instructed.   Maintain adequate nutrition, frequent repositioning , offloading with Zfloat boots.  Follow-up with your primary care physician and Wound Care Specialist within 1 week. Call for appointment.    Chronic deep vein thrombosis (DVT) of right lower extremity, unspecified vein: Take your &quot;blood thinners&quot; as prescribed.  Walking is encouraged, increase activity as tolerated.  If you develop new leg pain, swelling, and/or redness contact your healthcare provider.  If you develop new chest pain with difficulty breathing, a rapid heart rate and/or a feeling of passing out call emergency medical services 911.    Prophylactic measure: Prophylactic measure  Dementia without behavioral disturbance, unspecified dementia type: Continue with medications as prescribed by your doctor.  Maintain safe environment.  Maintain adequate nutrition, hydration.  Follow-up with your primary care physician within 1 week. Call for appointment.    Uncomplicated asthma, unspecified asthma severity, unspecified whether persistent: Stable.  Follow-up with your primary care physician within 1 week. Call for appointment.    Sepsis, due to unspecified organism: Take all antibiotics as ordered.  Call you Health care provider upon arrival home to make a one week follow up appointment.  If you develop fever, chills, malaise, or change in mental status call your Health Care Provider or go to the Emergency Department.  Nutrition is important, eat small frequent meals to help ensure you get adequate calories.  Do not stay in bed all day!  Increase your activity daily as tolerated.    PEG tube malfunction: PEG tube malfunction            RECS:        Please call with any questions.    Leigh Ann Resendez DO  [facility] Pulmonary/Sleep Medicine  [phone] PULMONARY PROGRESS NOTE    MYRIAM HEATH  MRN-[id number]    Patient is a 69y old  Female who presents with a chief complaint of PEG tube dislodgement, fever, leukocytosis (23 Jul 2019 07:58)      HPI:  -right lateral side  off feeds  on 2L NC  ROS:   -    ACTIVE MEDICATION LIST:  MEDICATIONS  (STANDING):  acetylcysteine 10%  Inhalation 5 milliLiter(s) Inhalation three times a day  apixaban 2.5 milliGRAM(s) Oral two times a day  artificial tears (preservative free) Ophthalmic Solution 1 Drop(s) Both EYES four times a day  ascorbic acid 500 milliGRAM(s) Oral daily  BACItracin   Ointment 1 Application(s) Topical two times a day  chlorhexidine 2% Cloths 1 Application(s) Topical daily  clonazePAM  Tablet 1 milliGRAM(s) Oral at bedtime  Dakins Solution - 1/4 Strength 1 Application(s) Topical two times a day  dextrose 5%. 1000 milliLiter(s) (50 mL/Hr) IV Continuous <Continuous>  famotidine    Tablet 20 milliGRAM(s) Oral daily  ferrous    sulfate Liquid 300 milliGRAM(s) Enteral Tube daily  formoterol for nebulization 20 MICROGram(s) Nebulizer two times a day  gabapentin   Solution 300 milliGRAM(s) Oral every 12 hours  hydrocortisone sodium succinate Injectable 25 milliGRAM(s) IV Push daily  insulin lispro (HumaLOG) corrective regimen sliding scale   SubCutaneous every 6 hours  levothyroxine Injectable 60 MICROGram(s) IV Push at bedtime  Medical Marijuana Awake Oil 2 milliLiter(s),Medical Marijuana Awake Oil 2 milliLiter(s) 2 milliLiter(s) Enteral Tube <User Schedule>  Medical Marijuana Calm Oil 2 milliLiter(s),Medical Marinjuana Calm Oil 2 milliLiter(s) 2 milliLiter(s) Enteral Tube <User Schedule>  Medical Marijuana [place] Oil 2 milliLiter(s),Medical Marijuana [place] Oil 2 milliLiter(s) 2 milliLiter(s) Enteral Tube <User Schedule>  metoprolol tartrate 12.5 milliGRAM(s) Oral two times a day  multivitamin/minerals/iron Oral Solution (CENTRUM) 15 milliLiter(s) Oral daily  QUEtiapine 25 milliGRAM(s) Oral at bedtime  sodium chloride 0.9%. 1000 milliLiter(s) (75 mL/Hr) IV Continuous <Continuous>  tiZANidine 4 milliGRAM(s) Oral <User Schedule>  zinc sulfate 220 milliGRAM(s) Oral daily    MEDICATIONS  (PRN):  acetaminophen  Suppository .. 650 milliGRAM(s) Rectal every 6 hours PRN Temp greater or equal to 38C (100.4F), Mild Pain (1 - 3)  carboxymethylcellulose 0.5% (Preservative-Free) Ophthalmic Solution 1 Drop(s) Both EYES three times a day PRN dry eyes  glucagon  Injectable 1 milliGRAM(s) IntraMuscular once PRN Glucose LESS THAN 70 milligrams/deciliter  nystatin Powder 1 Application(s) Topical three times a day PRN under breasts      EXAM:  Vital Signs Last 24 Hrs  T(C): 36.8 (23 Jul 2019 06:00), Max: 36.8 (22 Jul 2019 10:35)  T(F): 98.2 (23 Jul 2019 06:00), Max: 98.2 (22 Jul 2019 10:35)  HR: 87 (23 Jul 2019 06:00) (74 - 101)  BP: 103/57 (23 Jul 2019 06:00) (90/56 - 126/82)  BP(mean): --  RR: 18 (23 Jul 2019 06:00) (18 - 20)  SpO2: 99% (23 Jul 2019 06:00) (94% - 100%)    GENERAL: The patient is awake and alert in no apparent distress.     LUNGS: decrease breath sounds left thorax      HEART: Regular rate and rhythm without murmur.                            9.3    20.6  )-----------( 353      ( 22 Jul 2019 11:00 )             26.6       07-23    138  |  97  |  125<H>  < from: Xray Chest 1 View- PORTABLE-Routine (07.23.19 @ 11:27) >  INTERPRETATION:  Clinical indication: Cough.    Technique: Single view AP chest radiograph.    Comparison: Chest x-ray from 7/21/2019.    Findings:  Right central line, tip is not visualized.  Heart size cannot be assessed in single projection.  Left pleural effusion. The right lung is clear.    Impression:  Left pleural effusion.        < end of copied text >  ----------------------------<  90  3.6   |  19<L>  |  3.39<H>    Ca    8.2<L>      23 Jul 2019 06:33  Phos  7.5     07-22  Mg     2.1     07-22    TPro  4.7<L>  /  Alb  1.9<L>  /  TBili  0.2  /  DBili  x   /  AST  11  /  ALT  7<L>  /  AlkPhos  72  07-22        PROBLEM LIST:  69y Female with HEALTH ISSUES - PROBLEM Dx:  Fever: Fever  Type 2 diabetes mellitus without complication, without long-term current use of insulin:   Make sure you get your HgA1c checked every three months.  If you take oral diabetes medications, check your blood glucose two times a day.  If you take insulin, check your blood glucose before meals and at bedtime.  It&#x27;s important not to skip any meals.  Keep a log of your blood glucose results and always take it with you to your doctor appointments.  Keep a list of your current medications including injectables and over the counter medications and bring this medication list with you to all your doctor appointments.  If you have not seen your ophthalmologist this year call for appointment.  Check your feet daily for redness, sores, or openings. Do not self treat. If no improvement in two days call your primary care physician for an appointment.  Low blood sugar (hypoglycemia) is a blood sugar below 70mg/dl. Check your blood sugar if you feel signs/symptoms of hypoglycemia. If your blood sugar is below 70 take 15 grams of carbohydrates (ex 4 oz of apple juice, 3-4 glucose tablets, or 4-6 oz of regular soda) wait 15 minutes and repeat blood sugar to make sure it comes up above 70.  If your blood sugar is above 70 and you are due for a meal, have a meal.  If you are not due for a meal have a snack.  This snack helps keeps your blood sugar at a safe range.    Pressure injury of skin of sacral region, unspecified injury stage: Continue with wound care as instructed.   Maintain adequate nutrition, frequent repositioning , offloading with Zfloat boots.  Follow-up with your primary care physician and Wound Care Specialist within 1 week. Call for appointment.    Chronic deep vein thrombosis (DVT) of right lower extremity, unspecified vein: Take your &quot;blood thinners&quot; as prescribed.  Walking is encouraged, increase activity as tolerated.  If you develop new leg pain, swelling, and/or redness contact your healthcare provider.  If you develop new chest pain with difficulty breathing, a rapid heart rate and/or a feeling of passing out call emergency medical services 911.    Prophylactic measure: Prophylactic measure  Dementia without behavioral disturbance, unspecified dementia type: Continue with medications as prescribed by your doctor.  Maintain safe environment.  Maintain adequate nutrition, hydration.  Follow-up with your primary care physician within 1 week. Call for appointment.    Uncomplicated asthma, unspecified asthma severity, unspecified whether persistent: Stable.  Follow-up with your primary care physician within 1 week. Call for appointment.    Sepsis, due to unspecified organism: Take all antibiotics as ordered.  Call you Health care provider upon arrival home to make a one week follow up appointment.  If you develop fever, chills, malaise, or change in mental status call your Health Care Provider or go to the Emergency Department.  Nutrition is important, eat small frequent meals to help ensure you get adequate calories.  Do not stay in bed all day!  Increase your activity daily as tolerated.    PEG tube malfunction: PEG tube malfunction            RECS:  continue right lateral   restart feeds-   improved aeration , suggest starting feeds at slower rate  aspiration precautions  contiue mucomyst neb for now; check US xray- effusion size?  taper down hydrocortisone IVP            Please call with any questions.    Leigh Ann Resendez, DO  [facility] Pulmonary/Sleep Medicine  [phone]

## 2019-07-24 ENCOUNTER — RESULT REVIEW (OUTPATIENT)
Age: 69
End: 2019-07-24

## 2019-07-24 LAB
ALBUMIN SERPL ELPH-MCNC: 2.2 G/DL — LOW (ref 3.3–5)
ALP SERPL-CCNC: 77 U/L — SIGNIFICANT CHANGE UP (ref 40–120)
ALT FLD-CCNC: 6 U/L — LOW (ref 10–45)
ANION GAP SERPL CALC-SCNC: 24 MMOL/L — HIGH (ref 5–17)
APTT BLD: 35.1 SEC — SIGNIFICANT CHANGE UP (ref 27.5–36.3)
AST SERPL-CCNC: 11 U/L — SIGNIFICANT CHANGE UP (ref 10–40)
BILIRUB SERPL-MCNC: 0.2 MG/DL — SIGNIFICANT CHANGE UP (ref 0.2–1.2)
BUN SERPL-MCNC: 124 MG/DL — HIGH (ref 7–23)
CALCIUM SERPL-MCNC: 8.2 MG/DL — LOW (ref 8.4–10.5)
CHLORIDE SERPL-SCNC: 92 MMOL/L — LOW (ref 96–108)
CO2 SERPL-SCNC: 19 MMOL/L — LOW (ref 22–31)
CREAT SERPL-MCNC: 2.72 MG/DL — HIGH (ref 0.5–1.3)
GLUCOSE BLDC GLUCOMTR-MCNC: 109 MG/DL — HIGH (ref 70–99)
GLUCOSE BLDC GLUCOMTR-MCNC: 130 MG/DL — HIGH (ref 70–99)
GLUCOSE BLDC GLUCOMTR-MCNC: 164 MG/DL — HIGH (ref 70–99)
GLUCOSE BLDC GLUCOMTR-MCNC: 176 MG/DL — HIGH (ref 70–99)
GLUCOSE BLDC GLUCOMTR-MCNC: 87 MG/DL — SIGNIFICANT CHANGE UP (ref 70–99)
GLUCOSE BLDC GLUCOMTR-MCNC: 95 MG/DL — SIGNIFICANT CHANGE UP (ref 70–99)
GLUCOSE SERPL-MCNC: 104 MG/DL — HIGH (ref 70–99)
HCT VFR BLD CALC: 25.3 % — LOW (ref 34.5–45)
HGB BLD-MCNC: 8.1 G/DL — LOW (ref 11.5–15.5)
INR BLD: 1.37 RATIO — HIGH (ref 0.88–1.16)
MAGNESIUM SERPL-MCNC: 2.2 MG/DL — SIGNIFICANT CHANGE UP (ref 1.6–2.6)
MCHC RBC-ENTMCNC: 28.9 PG — SIGNIFICANT CHANGE UP (ref 27–34)
MCHC RBC-ENTMCNC: 32 GM/DL — SIGNIFICANT CHANGE UP (ref 32–36)
MCV RBC AUTO: 90.4 FL — SIGNIFICANT CHANGE UP (ref 80–100)
PHOSPHATE SERPL-MCNC: 8.5 MG/DL — HIGH (ref 2.5–4.5)
PLATELET # BLD AUTO: 343 K/UL — SIGNIFICANT CHANGE UP (ref 150–400)
POTASSIUM SERPL-MCNC: 3.3 MMOL/L — LOW (ref 3.5–5.3)
POTASSIUM SERPL-SCNC: 3.3 MMOL/L — LOW (ref 3.5–5.3)
PROT SERPL-MCNC: 5.2 G/DL — LOW (ref 6–8.3)
PROTHROM AB SERPL-ACNC: 15.8 SEC — HIGH (ref 10–12.9)
RBC # BLD: 2.8 M/UL — LOW (ref 3.8–5.2)
RBC # FLD: 15.3 % — HIGH (ref 10.3–14.5)
SODIUM SERPL-SCNC: 135 MMOL/L — SIGNIFICANT CHANGE UP (ref 135–145)
WBC # BLD: 15.48 K/UL — HIGH (ref 3.8–10.5)
WBC # FLD AUTO: 15.48 K/UL — HIGH (ref 3.8–10.5)

## 2019-07-24 PROCEDURE — 11042 DBRDMT SUBQ TIS 1ST 20SQCM/<: CPT

## 2019-07-24 PROCEDURE — 99232 SBSQ HOSP IP/OBS MODERATE 35: CPT

## 2019-07-24 PROCEDURE — 88304 TISSUE EXAM BY PATHOLOGIST: CPT | Mod: 26

## 2019-07-24 RX ORDER — POTASSIUM CHLORIDE 20 MEQ
10 PACKET (EA) ORAL ONCE
Refills: 0 | Status: COMPLETED | OUTPATIENT
Start: 2019-07-24 | End: 2019-07-24

## 2019-07-24 RX ORDER — IPRATROPIUM/ALBUTEROL SULFATE 18-103MCG
3 AEROSOL WITH ADAPTER (GRAM) INHALATION EVERY 6 HOURS
Refills: 0 | Status: DISCONTINUED | OUTPATIENT
Start: 2019-07-24 | End: 2019-08-07

## 2019-07-24 RX ADMIN — Medication 1 MILLIGRAM(S): at 21:58

## 2019-07-24 RX ADMIN — APIXABAN 2.5 MILLIGRAM(S): 2.5 TABLET, FILM COATED ORAL at 17:30

## 2019-07-24 RX ADMIN — Medication 25 MILLIGRAM(S): at 06:43

## 2019-07-24 RX ADMIN — QUETIAPINE FUMARATE 25 MILLIGRAM(S): 200 TABLET, FILM COATED ORAL at 21:58

## 2019-07-24 RX ADMIN — Medication 1 DROP(S): at 17:30

## 2019-07-24 RX ADMIN — Medication 3 MILLILITER(S): at 14:40

## 2019-07-24 RX ADMIN — Medication 1 APPLICATION(S): at 06:41

## 2019-07-24 RX ADMIN — Medication 100 MILLIEQUIVALENT(S): at 09:51

## 2019-07-24 RX ADMIN — Medication 1 DROP(S): at 12:17

## 2019-07-24 RX ADMIN — ZINC SULFATE TAB 220 MG (50 MG ZINC EQUIVALENT) 220 MILLIGRAM(S): 220 (50 ZN) TAB at 12:17

## 2019-07-24 RX ADMIN — GABAPENTIN 300 MILLIGRAM(S): 400 CAPSULE ORAL at 21:08

## 2019-07-24 RX ADMIN — GABAPENTIN 300 MILLIGRAM(S): 400 CAPSULE ORAL at 09:51

## 2019-07-24 RX ADMIN — Medication 12.5 MILLIGRAM(S): at 17:30

## 2019-07-24 RX ADMIN — Medication 20 MICROGRAM(S): at 20:08

## 2019-07-24 RX ADMIN — FAMOTIDINE 20 MILLIGRAM(S): 10 INJECTION INTRAVENOUS at 12:18

## 2019-07-24 RX ADMIN — Medication 1 APPLICATION(S): at 17:30

## 2019-07-24 RX ADMIN — Medication 1: at 12:18

## 2019-07-24 RX ADMIN — TIZANIDINE 4 MILLIGRAM(S): 4 TABLET ORAL at 09:52

## 2019-07-24 RX ADMIN — Medication 15 MILLILITER(S): at 12:18

## 2019-07-24 RX ADMIN — Medication 60 MICROGRAM(S): at 21:59

## 2019-07-24 RX ADMIN — Medication 1: at 17:30

## 2019-07-24 RX ADMIN — Medication 12.5 MILLIGRAM(S): at 06:42

## 2019-07-24 RX ADMIN — Medication 1 APPLICATION(S): at 17:15

## 2019-07-24 RX ADMIN — APIXABAN 2.5 MILLIGRAM(S): 2.5 TABLET, FILM COATED ORAL at 06:42

## 2019-07-24 RX ADMIN — Medication 1 DROP(S): at 06:42

## 2019-07-24 RX ADMIN — Medication 500 MILLIGRAM(S): at 12:18

## 2019-07-24 RX ADMIN — TIZANIDINE 4 MILLIGRAM(S): 4 TABLET ORAL at 21:09

## 2019-07-24 RX ADMIN — CHLORHEXIDINE GLUCONATE 1 APPLICATION(S): 213 SOLUTION TOPICAL at 12:18

## 2019-07-24 RX ADMIN — Medication 20 MICROGRAM(S): at 06:41

## 2019-07-24 RX ADMIN — Medication 300 MILLIGRAM(S): at 12:17

## 2019-07-24 NOTE — PROGRESS NOTE ADULT - ASSESSMENT
mpression:    Sacral Stage 4 pressure injury  Thoracic spine unstageable pressure injury  L lateral malleolus unstageable pressure injury  Right medial lower leg deep tissue injury    Recommend:  1.) Topical therapy:  a.) Sacrum - irrigate with 1/4 strength Dakins solution, pat dry, pack Dakins moistened meaghan , cover/pack lightly with  gauze, cover with DSD, secure with tegederm BID  b.) Thoracic spine wound - cleanse with NS, apply , Cavilon daily  c.) L lateral malleolus - cleanse with NS, pat dry, apply allevyn Life silicone border dressing q3 days  d.) R medial lower leg - cleanse with NS, pat dry, apply medihoney, cover with allevyn Life silicone border dressing daily  2.) Emollient therapy: Moisturize intact skin w/ SWEEN cr eam daily  3.) Continue alternating air with low air loss surface  4.) Turn and reposition q2 hours  5.) Nutrition per GI  6.) Incontinence management - pericare, incontinence cleanse, underpads  7.) Care as per medicine will follow w/ you    Upon [facility] [address] [place] [address] [phone]    Will follow.  Discussed with clinical nurse  Wendy Hammonds, NP-C, CWOCN [id number]

## 2019-07-24 NOTE — PROGRESS NOTE ADULT - ASSESSMENT
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 since admission, went to IR to have PEG replaced.  TRANG     1. TRANG likely multifactorial with Creatinine getting better   2.  Anemia; Hgb low; initial Hgb <7; repeat slightly better though <goal   3.  Hypokalemia     RECOMMEND:  - In next 24-48 hours will change the feeds back to glucerna.  For today leave on Nepro   - Lasix is now off and nonoliguric   - Maintain gavin catheter(chronic gavin)   - Strict I & Os  - Procrit 10000 x 1 in am likely

## 2019-07-24 NOTE — PROGRESS NOTE ADULT - SUBJECTIVE AND OBJECTIVE BOX
---___---___---___---___---___---___ ---___---___---___---___---___---___---___---___---                  M E D I C A L   A T T E N D I N G   P R O G R E S S   N O T E  ---___---___---___---___---___---___ ---___---___---___---___---___---___---___---___---        ================================================    ++CHIEF COMPLAINT:   Patient is a 69y old  Female who presents with a chief complaint of PEG tube dislodgement, fever, leukocytosis (2019 14:32)      Gastrostomy malfunction    ---___---___---___---___---___---  PAST MEDICAL / Surgical  HISTORY:  PAST MEDICAL & SURGICAL HISTORY:  Type 2 diabetes mellitus  Dementia  DVT, lower extremity  CVA (cerebral vascular accident)  Fatty pancreas  PNA (pneumonia)  Pulmonary HTN  IGT (impaired glucose tolerance)  Ulcerative colitis  Acid reflux  Anxiety  Depression  Mouth sores  HLD (hyperlipidemia)  Asthma  S/P percutaneous endoscopic gastrostomy (PEG) tube placement: for dysphagia  Humeral head fracture  H/O: hysterectomy  H/O cataract extraction, left  History of knee replacement      ---___---___---___---___---___---  FAMILY HISTORY:   FAMILY HISTORY:  Family history of dementia (Grandparent)  Family history of colon cancer (Grandparent)        ---___---___---___---___---___---  ALLERGIES:   Allergies    ASA; dye contrast (Anaphylaxis)  aspirin (Short breath)  divalproex sodium (Other (Unknown))  Flowers and Plants (Short breath)  Haldol (Other (Unknown))  penicillin (Short breath; Rash)  sulfa drugs (Short breath; Rash)  vancomycin (Rash; Urticaria; Hives)  Xanax (Other (Unknown))    Intolerances        ---___---___---___---___---___---  MEDICATIONS:  MEDICATIONS  (STANDING):  ALBUTerol/ipratropium for Nebulization 3 milliLiter(s) Nebulizer every 6 hours  apixaban 2.5 milliGRAM(s) Oral two times a day  artificial tears (preservative free) Ophthalmic Solution 1 Drop(s) Both EYES four times a day  ascorbic acid 500 milliGRAM(s) Oral daily  BACItracin   Ointment 1 Application(s) Topical two times a day  chlorhexidine 2% Cloths 1 Application(s) Topical daily  clonazePAM  Tablet 1 milliGRAM(s) Oral at bedtime  Dakins Solution - 1/4 Strength 1 Application(s) Topical two times a day  dextrose 5%. 1000 milliLiter(s) (50 mL/Hr) IV Continuous <Continuous>  famotidine    Tablet 20 milliGRAM(s) Oral daily  ferrous    sulfate Liquid 300 milliGRAM(s) Enteral Tube daily  formoterol for nebulization 20 MICROGram(s) Nebulizer two times a day  gabapentin   Solution 300 milliGRAM(s) Oral every 12 hours  hydrocortisone sodium succinate Injectable 25 milliGRAM(s) IV Push daily  insulin lispro (HumaLOG) corrective regimen sliding scale   SubCutaneous every 6 hours  levothyroxine Injectable 60 MICROGram(s) IV Push at bedtime  Medical Marijuana Awake Oil 2 milliLiter(s),Medical Marijuana Awake Oil 2 milliLiter(s) 2 milliLiter(s) Enteral Tube <User Schedule>  Medical Marijuana Calm Oil 2 milliLiter(s),Medical [place] Calm Oil 2 milliLiter(s) 2 milliLiter(s) Enteral Tube <User Schedule>  Medical Marijuana [place] Oil 2 milliLiter(s),Medical Marijuana [place] Oil 2 milliLiter(s) 2 milliLiter(s) Enteral Tube <User Schedule>  metoprolol tartrate 12.5 milliGRAM(s) Oral two times a day  multivitamin/minerals/iron Oral Solution (CENTRUM) 15 milliLiter(s) Oral daily  QUEtiapine 25 milliGRAM(s) Oral at bedtime  sodium chloride 0.9%. 1000 milliLiter(s) (75 mL/Hr) IV Continuous <Continuous>  tiZANidine 4 milliGRAM(s) Oral <User Schedule>  zinc sulfate 220 milliGRAM(s) Oral daily    MEDICATIONS  (PRN):  acetaminophen  Suppository .. 650 milliGRAM(s) Rectal every 6 hours PRN Temp greater or equal to 38C (100.4F), Mild Pain (1 - 3)  carboxymethylcellulose 0.5% (Preservative-Free) Ophthalmic Solution 1 Drop(s) Both EYES three times a day PRN dry eyes  glucagon  Injectable 1 milliGRAM(s) IntraMuscular once PRN Glucose LESS THAN 70 milligrams/deciliter  nystatin Powder 1 Application(s) Topical three times a day PRN under breasts      ---___---___---___---___---___---  REVIEW OF SYSTEM:    GEN: no fever, no chills, no pain  RESP: no SOB, no cough, no sputum  CVS: no chest pain, no palpitations, no edema  GI: no abdominal pain, no nausea, no vomiting, no constipation, no diarrhea  : no dysurea, no frequency, no hematurea  Neuro: no headache, no dizziness  PSYCH: no anxiety, no depression  Derm : no itching, no rash    ---___---___---___---___---___---  VITAL SIGNS:  69y , CAPILLARY BLOOD GLUCOSE      POCT Blood Glucose.: 176 mg/dL (2019 12:10)    T(C): 36.5 (19 @ 13:42), Max: 36.8 (19 @ 17:00)  HR: 97 (19 @ 13:42) (91 - 122)  BP: 146/81 (19 @ 13:42) (105/66 - 166/82)  RR: 18 (19 @ 13:42) (18 - 20)  SpO2: 97% (19 @ 13:42) (95% - 98%)  ---___---___---___---___---___---  PHYSICAL EXAM:    GEN: A&O X 3 , NAD , comfortable  HEENT: NCAT, PERRL, MMM, hearing intact  Neck: supple , no JVD  CVS: S1S2 , regular , No M/R/G appreciated  PULM: CTA B/L,  no W/R/R appreciated  ABD.: soft. non tender, non distended,  bowel sounds present  Extrem: intact pulses , no edema   Derm: No rash , no ecchymoses  PSYCH : normal mood,  no delusion not anxious     ---___---___---___---___---___---            LAB AND IMAGIN.1    15.48 )-----------( 343      ( 2019 09:15 )             25.3               07-24    135  |  92<L>  |  124<H>  ----------------------------<  104<H>  3.3<L>   |  19<L>  |  2.72<H>    Ca    8.2<L>      2019 06:24  Phos  8.5     07-  Mg     2.2     07-24    TPro  5.2<L>  /  Alb  2.2<L>  /  TBili  0.2  /  DBili  x   /  AST  11  /  ALT  6<L>  /  AlkPhos  77  07-24    PT/INR - ( 2019 06:18 )   PT: 15.8 sec;   INR: 1.37 ratio         PTT - ( 2019 06:18 )  PTT:35.1 sec                            [All pertinent / recent Imaging reviewed]         ---___---___---___---___---___---___ ---___---___---___---___---                         A S S E S S M E N T   A N D   P L A N :      HEALTH ISSUES - PROBLEM Dx:  Fever: Fever  Type 2 diabetes mellitus without complication, without long-term current use of insulin:   Make sure you get your HgA1c checked every three months.  If you take oral diabetes medications, check your blood glucose two times a day.  If you take insulin, check your blood glucose before meals and at bedtime.  It&#x27;s important not to skip any meals.  Keep a log of your blood glucose results and always take it with you to your doctor appointments.  Keep a list of your current medications including injectables and over the counter medications and bring this medication list with you to all your doctor appointments.  If you have not seen your ophthalmologist this year call for appointment.  Check your feet daily for redness, sores, or openings. Do not self treat. If no improvement in two days call your primary care physician for an appointment.  Low blood sugar (hypoglycemia) is a blood sugar below 70mg/dl. Check your blood sugar if you feel signs/symptoms of hypoglycemia. If your blood sugar is below 70 take 15 grams of carbohydrates (ex 4 oz of apple juice, 3-4 glucose tablets, or 4-6 oz of regular soda) wait 15 minutes and repeat blood sugar to make sure it comes up above 70.  If your blood sugar is above 70 and you are due for a meal, have a meal.  If you are not due for a meal have a snack.  This snack helps keeps your blood sugar at a safe range.    Pressure injury of skin of sacral region, unspecified injury stage: Continue with wound care as instructed.   Maintain adequate nutrition, frequent repositioning , offloading with Zfloat boots.  Follow-up with your primary care physician and Wound Care Specialist within 1 week. Call for appointment.    Chronic deep vein thrombosis (DVT) of right lower extremity, unspecified vein: Take your &quot;blood thinners&quot; as prescribed.  Walking is encouraged, increase activity as tolerated.  If you develop new leg pain, swelling, and/or redness contact your healthcare provider.  If you develop new chest pain with difficulty breathing, a rapid heart rate and/or a feeling of passing out call emergency medical services 911.    Prophylactic measure: Prophylactic measure  Dementia without behavioral disturbance, unspecified dementia type: Continue with medications as prescribed by your doctor.  Maintain safe environment.  Maintain adequate nutrition, hydration.  Follow-up with your primary care physician within 1 week. Call for appointment.    Uncomplicated asthma, unspecified asthma severity, unspecified whether persistent: Stable.  Follow-up with your primary care physician within 1 week. Call for appointment.    Sepsis, due to unspecified organism: Take all antibiotics as ordered.  Call you Health care provider upon arrival home to make a one week follow up appointment.  If you develop fever, chills, malaise, or change in mental status call your Health Care Provider or go to the Emergency Department.  Nutrition is important, eat small frequent meals to help ensure you get adequate calories.  Do not stay in bed all day!  Increase your activity daily as tolerated.    PEG tube malfunction: PEG tube malfunction                -GI/DVT Prophylaxis.    --------------------------------------------  Case discussed with   Education given on   ___________________________  Thank you,  Deniz Bolden  [phone]

## 2019-07-24 NOTE — PROGRESS NOTE ADULT - SUBJECTIVE AND OBJECTIVE BOX
---___---___---___---___---___---___ ---___---___---___---___---___---___---___---___---                  M E D I C A L   A T T E N D I N G   P R O G R E S S   N O T E  ---___---___---___---___---___---___ ---___---___---___---___---___---___---___---___---        ================================================    ++CHIEF COMPLAINT:   Patient is a 69y old  Female who presents with a chief complaint of PEG tube dislodgement, fever, leukocytosis (2019 14:32)    following and trending labs   ---___---___---___---___---___---  PAST MEDICAL / Surgical  HISTORY:  PAST MEDICAL & SURGICAL HISTORY:  Type 2 diabetes mellitus  Dementia  DVT, lower extremity  CVA (cerebral vascular accident)  Fatty pancreas  PNA (pneumonia)  Pulmonary HTN  IGT (impaired glucose tolerance)  Ulcerative colitis  Acid reflux  Anxiety  Depression  Mouth sores  HLD (hyperlipidemia)  Asthma  S/P percutaneous endoscopic gastrostomy (PEG) tube placement: for dysphagia  Humeral head fracture  H/O: hysterectomy  H/O cataract extraction, left  History of knee replacement      ---___---___---___---___---___---  FAMILY HISTORY:   FAMILY HISTORY:  Family history of dementia (Grandparent)  Family history of colon cancer (Grandparent)        ---___---___---___---___---___---  ALLERGIES:   Allergies    ASA; dye contrast (Anaphylaxis)  aspirin (Short breath)  divalproex sodium (Other (Unknown))  Flowers and Plants (Short breath)  Haldol (Other (Unknown))  penicillin (Short breath; Rash)  sulfa drugs (Short breath; Rash)  vancomycin (Rash; Urticaria; Hives)  Xanax (Other (Unknown))    Intolerances        ---___---___---___---___---___---  MEDICATIONS:  MEDICATIONS  (STANDING):  ALBUTerol/ipratropium for Nebulization 3 milliLiter(s) Nebulizer every 6 hours  apixaban 2.5 milliGRAM(s) Oral two times a day  artificial tears (preservative free) Ophthalmic Solution 1 Drop(s) Both EYES four times a day  ascorbic acid 500 milliGRAM(s) Oral daily  BACItracin   Ointment 1 Application(s) Topical two times a day  chlorhexidine 2% Cloths 1 Application(s) Topical daily  clonazePAM  Tablet 1 milliGRAM(s) Oral at bedtime  Dakins Solution - 1/4 Strength 1 Application(s) Topical two times a day  dextrose 5%. 1000 milliLiter(s) (50 mL/Hr) IV Continuous <Continuous>  famotidine    Tablet 20 milliGRAM(s) Oral daily  ferrous    sulfate Liquid 300 milliGRAM(s) Enteral Tube daily  formoterol for nebulization 20 MICROGram(s) Nebulizer two times a day  gabapentin   Solution 300 milliGRAM(s) Oral every 12 hours  hydrocortisone sodium succinate Injectable 25 milliGRAM(s) IV Push daily  insulin lispro (HumaLOG) corrective regimen sliding scale   SubCutaneous every 6 hours  levothyroxine Injectable 60 MICROGram(s) IV Push at bedtime  Medical Marijuana Awake Oil 2 milliLiter(s),Medical Marijuana Awake Oil 2 milliLiter(s) 2 milliLiter(s) Enteral Tube <User Schedule>  Medical Marijuana Calm Oil 2 milliLiter(s),Medical [place] Calm Oil 2 milliLiter(s) 2 milliLiter(s) Enteral Tube <User Schedule>  Medical Marijuana [place] Oil 2 milliLiter(s),Medical Marijuana [place] Oil 2 milliLiter(s) 2 milliLiter(s) Enteral Tube <User Schedule>  metoprolol tartrate 12.5 milliGRAM(s) Oral two times a day  multivitamin/minerals/iron Oral Solution (CENTRUM) 15 milliLiter(s) Oral daily  QUEtiapine 25 milliGRAM(s) Oral at bedtime  sodium chloride 0.9%. 1000 milliLiter(s) (75 mL/Hr) IV Continuous <Continuous>  tiZANidine 4 milliGRAM(s) Oral <User Schedule>  zinc sulfate 220 milliGRAM(s) Oral daily    MEDICATIONS  (PRN):  acetaminophen  Suppository .. 650 milliGRAM(s) Rectal every 6 hours PRN Temp greater or equal to 38C (100.4F), Mild Pain (1 - 3)  carboxymethylcellulose 0.5% (Preservative-Free) Ophthalmic Solution 1 Drop(s) Both EYES three times a day PRN dry eyes  glucagon  Injectable 1 milliGRAM(s) IntraMuscular once PRN Glucose LESS THAN 70 milligrams/deciliter  nystatin Powder 1 Application(s) Topical three times a day PRN under breasts      ---___---___---___---___---___---  REVIEW OF SYSTEM:    unable to obtain   ---___---___---___---___---___---  VITAL SIGNS:  69y , CAPILLARY BLOOD GLUCOSE      POCT Blood Glucose.: 176 mg/dL (2019 12:10)    T(C): 36.5 (19 @ 13:42), Max: 36.8 (19 @ 17:00)  HR: 97 (19 @ 13:42) (91 - 122)  BP: 146/81 (19 @ 13:42) (105/66 - 166/82)  RR: 18 (19 @ 13:42) (18 - 20)  SpO2: 97% (19 @ 13:42) (95% - 98%)  ---___---___---___---___---___---  PHYSICAL EXAM:    GEN: A&O X 0 , NAD , comfortable  HEENT: NCAT, PERRL, MMM, hearing intact  Neck: supple , no JVD  CVS: S1S2 , regular , No M/R/G appreciated  PULM: CTA B/L,  no W/R/R appreciated  ABD.: soft. non tender, non distended,  bowel sounds present peg noted   Extrem: intact pulses , no edema   Derm: No rash , no ecchymoses        ---___---___---___---___---___---            LAB AND IMAGIN.1    15.48 )-----------( 343      ( 2019 09:15 )             25.3               07-24    135  |  92<L>  |  124<H>  ----------------------------<  104<H>  3.3<L>   |  19<L>  |  2.72<H>    Ca    8.2<L>      2019 06:24  Phos  8.5     07-24  Mg     2.2     -24    TPro  5.2<L>  /  Alb  2.2<L>  /  TBili  0.2  /  DBili  x   /  AST  11  /  ALT  6<L>  /  AlkPhos  77  07-24    PT/INR - ( 2019 06:18 )   PT: 15.8 sec;   INR: 1.37 ratio         PTT - ( 2019 06:18 )  PTT:35.1 sec                            [All pertinent / recent Imaging reviewed]         ---___---___---___---___---___---___ ---___---___---___---___---                         A S S E S S M E N T   A N D   P L A N :      HEALTH ISSUES - PROBLEM Dx:  libia improving   anemia follow cbc   chronic pain on marijuana and neurontin  for fracture of the left leg unhealed   asthma on nebulizer   chf cotniue meds cardiology following   dysphagia on peg feeds  dm2 on fingersticks and insulin   -GI/DVT Prophylaxis. on eliquis      overall prognosis poor    --------------------------------------------  Case discussed with   Education given on   ___________________________  Thank you,  Deniz Bolden  [phone]

## 2019-07-24 NOTE — PROGRESS NOTE ADULT - SUBJECTIVE AND OBJECTIVE BOX
PULMONARY PROGRESS NOTE    MYRIAM HEATH  MRN-[id number]    Patient is a 69y old  Female who presents with a chief complaint of PEG tube dislodgement, fever, leukocytosis (24 Jul 2019 13:14)      HPI:  afebrile overnight  on 1L NC      ROS:   -    ACTIVE MEDICATION LIST:  MEDICATIONS  (STANDING):  apixaban 2.5 milliGRAM(s) Oral two times a day  artificial tears (preservative free) Ophthalmic Solution 1 Drop(s) Both EYES four times a day  ascorbic acid 500 milliGRAM(s) Oral daily  BACItracin   Ointment 1 Application(s) Topical two times a day  chlorhexidine 2% Cloths 1 Application(s) Topical daily  clonazePAM  Tablet 1 milliGRAM(s) Oral at bedtime  Dakins Solution - 1/4 Strength 1 Application(s) Topical two times a day  dextrose 5%. 1000 milliLiter(s) (50 mL/Hr) IV Continuous <Continuous>  famotidine    Tablet 20 milliGRAM(s) Oral daily  ferrous    sulfate Liquid 300 milliGRAM(s) Enteral Tube daily  formoterol for nebulization 20 MICROGram(s) Nebulizer two times a day  gabapentin   Solution 300 milliGRAM(s) Oral every 12 hours  hydrocortisone sodium succinate Injectable 25 milliGRAM(s) IV Push daily  insulin lispro (HumaLOG) corrective regimen sliding scale   SubCutaneous every 6 hours  levothyroxine Injectable 60 MICROGram(s) IV Push at bedtime  Medical Marijuana Awake Oil 2 milliLiter(s),Medical Marijuana [place] Oil 2 milliLiter(s) 2 milliLiter(s) Enteral Tube <User Schedule>  Medical Marijuana Calm Oil 2 milliLiter(s),Medical [place] Calm Oil 2 milliLiter(s) 2 milliLiter(s) Enteral Tube <User Schedule>  Medical Marijuana [place] Oil 2 milliLiter(s),Medical Marijuana [place] Oil 2 milliLiter(s) 2 milliLiter(s) Enteral Tube <User Schedule>  metoprolol tartrate 12.5 milliGRAM(s) Oral two times a day  multivitamin/minerals/iron Oral Solution (CENTRUM) 15 milliLiter(s) Oral daily  QUEtiapine 25 milliGRAM(s) Oral at bedtime  sodium chloride 0.9%. 1000 milliLiter(s) (75 mL/Hr) IV Continuous <Continuous>  tiZANidine 4 milliGRAM(s) Oral <User Schedule>  zinc sulfate 220 milliGRAM(s) Oral daily    MEDICATIONS  (PRN):  acetaminophen  Suppository .. 650 milliGRAM(s) Rectal every 6 hours PRN Temp greater or equal to 38C (100.4F), Mild Pain (1 - 3)  carboxymethylcellulose 0.5% (Preservative-Free) Ophthalmic Solution 1 Drop(s) Both EYES three times a day PRN dry eyes  glucagon  Injectable 1 milliGRAM(s) IntraMuscular once PRN Glucose LESS THAN 70 milligrams/deciliter  nystatin Powder 1 Application(s) Topical three times a day PRN under breasts      EXAM:  Vital Signs Last 24 Hrs  T(C): 36.5 (24 Jul 2019 13:42), Max: 36.8 (23 Jul 2019 17:00)  T(F): 97.7 (24 Jul 2019 13:42), Max: 98.2 (23 Jul 2019 17:00)  HR: 97 (24 Jul 2019 13:42) (91 - 122)  BP: 146/81 (24 Jul 2019 13:42) (105/66 - 166/82)  BP(mean): --  RR: 18 (24 Jul 2019 13:42) (18 - 20)  SpO2: 97% (24 Jul 2019 13:42) (95% - 98%)    GENERAL: The patient is lethargic    LUNGS: air entry Right lung field; no wheezing ; no wheezing anterior lung fields b/l     HEART: Regular rate and rhythm without murmur.                            8.1    15.48 )-----------( 343      ( 24 Jul 2019 09:15 )             25.3       07-24    135  |  92<L>  |  124<H>  ----------------------------<  104<H>  3.3<L>   |  19<L>  |  2.72<H>    Ca    8.2<L>      24 Jul 2019 06:24  Phos  8.5     07-24  Mg     2.2     07-24    TPro  5.2<L>  /  Alb  2.2<L>  /  TBili  0.2  /  DBili  x   /  AST  11  /  ALT  6<L>  /  AlkPhos  77  07-24        PROBLEM LIST:  69y Female with HEALTH ISSUES - PROBLEM Dx:  Fever: Fever  Type 2 diabetes mellitus without complication, without long-term current use of insulin:   Make sure you get your HgA1c checked every three months.  If you take oral diabetes medications, check your blood glucose two times a day.  If you take insulin, check your blood glucose before meals and at bedtime.  It&#x27;s important not to skip any meals.  Keep a log of your blood glucose results and always take it with you to your doctor appointments.  Keep a list of your current medications including injectables and over the counter medications and bring this medication list with you to all your doctor appointments.  If you have not seen your ophthalmologist this year call for appointment.  Check your feet daily for redness, sores, or openings. Do not self treat. If no improvement in two days call your primary care physician for an appointment.  Low blood sugar (hypoglycemia) is a blood sugar below 70mg/dl. Check your blood sugar if you feel signs/symptoms of hypoglycemia. If your blood sugar is below 70 take 15 grams of carbohydrates (ex 4 oz of apple juice, 3-4 glucose tablets, or 4-6 oz of regular soda) wait 15 minutes and repeat blood sugar to make sure it comes up above 70.  If your blood sugar is above 70 and you are due for a meal, have a meal.  If you are not due for a meal have a snack.  This snack helps keeps your blood sugar at a safe range.    Pressure injury of skin of sacral region, unspecified injury stage: Continue with wound care as instructed.   Maintain adequate nutrition, frequent repositioning , offloading with Zfloat boots.  Follow-up with your primary care physician and Wound Care Specialist within 1 week. Call for appointment.    Chronic deep vein thrombosis (DVT) of right lower extremity, unspecified vein: Take your &quot;blood thinners&quot; as prescribed.  Walking is encouraged, increase activity as tolerated.  If you develop new leg pain, swelling, and/or redness contact your healthcare provider.  If you develop new chest pain with difficulty breathing, a rapid heart rate and/or a feeling of passing out call emergency medical services 911.    Prophylactic measure: Prophylactic measure  Dementia without behavioral disturbance, unspecified dementia type: Continue with medications as prescribed by your doctor.  Maintain safe environment.  Maintain adequate nutrition, hydration.  Follow-up with your primary care physician within 1 week. Call for appointment.    Uncomplicated asthma, unspecified asthma severity, unspecified whether persistent: Stable.  Follow-up with your primary care physician within 1 week. Call for appointment.    Sepsis, due to unspecified organism: Take all antibiotics as ordered.  Call you Health care provider upon arrival home to make a one week follow up appointment.  If you develop fever, chills, malaise, or change in mental status call your Health Care Provider or go to the Emergency Department.  Nutrition is important, eat small frequent meals to help ensure you get adequate calories.  Do not stay in bed all day!  Increase your activity daily as tolerated.    PEG tube malfunction: PEG tube malfunction            RECS:  nebulizer for wheezing  continue 02  follow off abx  continue chest PT  would hold off on evaluating size of effusions- not requiring more 02, would not do any intervention at this time       Please call with any questions.    Leigh Ann Resendez DO  [facility] Pulmonary[facility]Sleep Medicine  [phone]

## 2019-07-24 NOTE — PROGRESS NOTE ADULT - SUBJECTIVE AND OBJECTIVE BOX
infectious diseases progress note:    Patient is a 69y old  Female who presents with a chief complaint of PEG tube dislodgement, fever, leukocytosis (23 Jul 2019 14:04)        Gastrostomy malfunction        ness    Allergies    ASA; dye contrast (Anaphylaxis)  aspirin (Short breath)  divalproex sodium (Other (Unknown))  Flowers and Plants (Short breath)  Haldol (Other (Unknown))  penicillin (Short breath; Rash)  sulfa drugs (Short breath; Rash)  vancomycin (Rash; Urticaria; Hives)  Xanax (Other (Unknown))    Intolerances        ANTIBIOTICS/RELEVANT:  antimicrobials    immunologic:    OTHER:  acetaminophen  Suppository .. 650 milliGRAM(s) Rectal every 6 hours PRN  apixaban 2.5 milliGRAM(s) Oral two times a day  artificial tears (preservative free) Ophthalmic Solution 1 Drop(s) Both EYES four times a day  ascorbic acid 500 milliGRAM(s) Oral daily  BACItracin   Ointment 1 Application(s) Topical two times a day  carboxymethylcellulose 0.5% (Preservative-Free) Ophthalmic Solution 1 Drop(s) Both EYES three times a day PRN  chlorhexidine 2% Cloths 1 Application(s) Topical daily  clonazePAM  Tablet 1 milliGRAM(s) Oral at bedtime  Dakins Solution - 1/4 Strength 1 Application(s) Topical two times a day  dextrose 5%. 1000 milliLiter(s) IV Continuous <Continuous>  famotidine    Tablet 20 milliGRAM(s) Oral daily  ferrous    sulfate Liquid 300 milliGRAM(s) Enteral Tube daily  formoterol for nebulization 20 MICROGram(s) Nebulizer two times a day  gabapentin   Solution 300 milliGRAM(s) Oral every 12 hours  glucagon  Injectable 1 milliGRAM(s) IntraMuscular once PRN  hydrocortisone sodium succinate Injectable 25 milliGRAM(s) IV Push daily  insulin lispro (HumaLOG) corrective regimen sliding scale   SubCutaneous every 6 hours  levothyroxine Injectable 60 MICROGram(s) IV Push at bedtime  Medical Marijuana Awake Oil 2 milliLiter(s),Medical Marijuana Awake Oil 2 milliLiter(s) 2 milliLiter(s) Enteral Tube <User Schedule>  Medical Marijuana Calm Oil 2 milliLiter(s),Medical Marinjuana Calm Oil 2 milliLiter(s) 2 milliLiter(s) Enteral Tube <User Schedule>  Medical Marijuana [place] Oil 2 milliLiter(s),Medical Marijuana [place] Oil 2 milliLiter(s) 2 milliLiter(s) Enteral Tube <User Schedule>  metoprolol tartrate 12.5 milliGRAM(s) Oral two times a day  multivitamin/minerals/iron Oral Solution (CENTRUM) 15 milliLiter(s) Oral daily  nystatin Powder 1 Application(s) Topical three times a day PRN  QUEtiapine 25 milliGRAM(s) Oral at bedtime  sodium chloride 0.9%. 1000 milliLiter(s) IV Continuous <Continuous>  tiZANidine 4 milliGRAM(s) Oral <User Schedule>  zinc sulfate 220 milliGRAM(s) Oral daily      Objective:  Vital Signs Last 24 Hrs  T(C): 36.5 (24 Jul 2019 06:00), Max: 36.8 (23 Jul 2019 17:00)  T(F): 97.7 (24 Jul 2019 06:00), Max: 98.2 (23 Jul 2019 17:00)  HR: 122 (24 Jul 2019 06:00) (85 - 122)  BP: 166/82 (24 Jul 2019 06:00) (113/65 - 166/82)  BP(mean): --  RR: 18 (24 Jul 2019 06:00) (18 - 20)  SpO2: 96% (24 Jul 2019 06:00) (96% - 100%)       Eyes:JACQUELIN, EOMI  Ear/Nose/Throat: no oral lesion, no sinus tenderness on percussion	  Neck:no JVD, no lymphadenopathy, supple  Respiratory: CTA maryjane  Cardiovascular: S1S2 RRR, no murmurs  Gastrointestinal:soft, (+) BS, no HSM  Extremities: contracted       LABS:                        8.1    16.44 )-----------( 334      ( 23 Jul 2019 09:08 )             25.3     07-24    135  |  92<L>  |  x   ----------------------------<  104<H>  3.3<L>   |  19<L>  |  2.72<H>    Ca    8.2<L>      24 Jul 2019 06:24  Phos  8.5     07-24  Mg     2.2     07-24    TPro  5.2<L>  /  Alb  2.2<L>  /  TBili  0.2  /  DBili  x   /  AST  11  /  ALT  6<L>  /  AlkPhos  77  07-24    PT/INR - ( 24 Jul 2019 06:18 )   PT: 15.8 sec;   INR: 1.37 ratio         PTT - ( 24 Jul 2019 06:18 )  PTT:35.1 sec        MICROBIOLOGY:    RECENT CULTURES:        RESPIRATORY CULTURES:              RADIOLOGY & ADDITIONAL STUDIES:        Pager [phone]  After 5 pm/weekends or if no response :[phone]

## 2019-07-24 NOTE — PROGRESS NOTE ADULT - SUBJECTIVE AND OBJECTIVE BOX
Wound SURGERY Progress NOTE    Follow up visit to patient for wound management. As per clinical nurse and patient's  who was at the bedside, the sacral and all back wounds were just redressed prior to our visit. At patient's 's request, these wound dressings were not removed. She was seen by Dr. Shin and NAVJOT. She is well known to and followed by the [facility]. She was encountered on an alternating air with low air loss surface in supine position.      PAST MEDICAL & SURGICAL HISTORY:  Type 2 diabetes mellitus  Dementia  DVT, lower extremity  CVA (cerebral vascular accident)  Fatty pancreas  PNA (pneumonia)  Pulmonary HTN  IGT (impaired glucose tolerance)  Ulcerative colitis  Acid reflux  Anxiety  Depression  Mouth sores  HLD (hyperlipidemia)  Asthma  S/P percutaneous endoscopic gastrostomy (PEG) tube placement: for dysphagia  Humeral head fracture  H/O: hysterectomy  H/O cataract extraction, left  History of knee replacement    REVIEW OF SYSTEMS  Patient is non-verbal. The ROS was provided by spouse who was at bedside during this visit.    General: chronically ill with multiple prolonged hospitalization, fever this am  Respiratory and Thorax: asthma on chronic prednisone  Gastrointestinal:	PEG tube dislodgement	  Genitourinary: multiple UTIs  Musculoskeletal:	 severe contractions in bilateral upper and lower extremities  Neurological: baseline non-verbal, opens eyes	  Endocrine: diabetes, glycemic control with oral meds at home, s/s insulin as inpatient  Skin: multiple pressure injuries, chronic    Allergies    ASA; dye contrast (Anaphylaxis)  aspirin (Short breath)  divalproex sodium (Other (Unknown))  Flowers and Plants (Short breath)  Haldol (Other (Unknown))  penicillin (Short breath; Rash)  sulfa drugs (Short breath; Rash)  vancomycin (Rash; Urticaria; Hives)  Xanax (Other (Unknown))    MEDICATIONS  (STANDING):  apixaban 2.5 milliGRAM(s) Oral two times a day  artificial tears (preservative free) Ophthalmic Solution 1 Drop(s) Both EYES four times a day  ascorbic acid 500 milliGRAM(s) Oral daily  BACItracin   Ointment 1 Application(s) Topical two times a day  chlorhexidine 2% Cloths 1 Application(s) Topical daily  clonazePAM  Tablet 1 milliGRAM(s) Oral at bedtime  Dakins Solution - 1/4 Strength 1 Application(s) Topical two times a day  dextrose 5%. 1000 milliLiter(s) (50 mL/Hr) IV Continuous <Continuous>  famotidine    Tablet 20 milliGRAM(s) Oral daily  ferrous    sulfate Liquid 300 milliGRAM(s) Enteral Tube daily  formoterol for nebulization 20 MICROGram(s) Nebulizer two times a day  gabapentin   Solution 300 milliGRAM(s) Oral every 12 hours  hydrocortisone sodium succinate Injectable 25 milliGRAM(s) IV Push daily  insulin lispro (HumaLOG) corrective regimen sliding scale   SubCutaneous every 6 hours  levothyroxine Injectable 60 MICROGram(s) IV Push at bedtime  Medical Marijuana Awake Oil 2 milliLiter(s),Medical Marijuana Awake Oil 2 milliLiter(s) 2 milliLiter(s) Enteral Tube <User Schedule>  Medical Marijuana Calm Oil 2 milliLiter(s),Medical Marinjuana Calm Oil 2 milliLiter(s) 2 milliLiter(s) Enteral Tube <User Schedule>  Medical Marijuana [place] Oil 2 milliLiter(s),Medical Marijuana [place] Oil 2 milliLiter(s) 2 milliLiter(s) Enteral Tube <User Schedule>  metoprolol tartrate 12.5 milliGRAM(s) Oral two times a day  multivitamin/minerals/iron Oral Solution (CENTRUM) 15 milliLiter(s) Oral daily  QUEtiapine 25 milliGRAM(s) Oral at bedtime  sodium chloride 0.9%. 1000 milliLiter(s) (75 mL/Hr) IV Continuous <Continuous>  tiZANidine 4 milliGRAM(s) Oral <User Schedule>  zinc sulfate 220 milliGRAM(s) Oral daily    MEDICATIONS  (PRN):  acetaminophen  Suppository .. 650 milliGRAM(s) Rectal every 6 hours PRN Temp greater or equal to 38C (100.4F), Mild Pain (1 - 3)  carboxymethylcellulose 0.5% (Preservative-Free) Ophthalmic Solution 1 Drop(s) Both EYES three times a day PRN dry eyes  glucagon  Injectable 1 milliGRAM(s) IntraMuscular once PRN Glucose LESS THAN 70 milligrams/deciliter  nystatin Powder 1 Application(s) Topical three times a day PRN under breasts    SOCIAL HISTORY:  / /single/ ; (+)HHA/ lives in SNF; Former smoker, Denies smoking, ETOH, drugs    FAMILY HISTORY:  Family history of dementia (Grandparent)  Family history of colon cancer (Grandparent)      Vital Signs Last 24 Hrs  T(C): 36.6 (24 Jul 2019 09:21), Max: 36.8 (23 Jul 2019 17:00)  T(F): 97.9 (24 Jul 2019 09:21), Max: 98.2 (23 Jul 2019 17:00)  HR: 97 (24 Jul 2019 09:21) (91 - 122)  BP: 144/80 (24 Jul 2019 09:21) (105/66 - 166/82)  BP(mean): --  RR: 18 (24 Jul 2019 09:21) (18 - 20)  SpO2: 97% (24 Jul 2019 09:21) (95% - 98%)    Physical Exam:  General: cachectic, frail, chronically ill appearing  Respiratory: no cough or SOB, O2 NC in place  Gastrointestinal soft NT/ND (+) PEG  Neurology: not following commands, responds to 's questions by nodding head slightly yes or no  Musculoskeletal: severe upper and lower extremity contractions, Left leg with brace in place  Vascular: + BLE DP/PT pulses palpable, BLE equally warm, no acute ischemia noted  Skin:    Thoracic spine and Sacral, Left ankle lateral malleolus wounds not assessed.    Right medial lower leg wound with 2 components  by a skin bridge, Distal wound L 5.5cm x W 3.2cm x 0.2cm approximately 90% covered with  slough, scant serosangiunous drainage, no erythema, induration, fluctuance or odor, Proximal wound L 3.0cm x W 1.0cm x D 0.1cm no necrotic tissue, + epithelialization, moderate serosangiunous drainage, no erythema, induration, fluctuance or odor.     After obtaining consent for debridement Mr. Colon, the right medial distal lower leg necrotic tissue was excisionally debrided to fascia with a #15 scalpel and forceps. The time out protocol was followed. Mrs. Colon tolerated the procedure without difficulty. No bleeding was encountered and hemostasis was maintained throughout.    Pre Procedure measurements:  L 5.5cm x W 3.2cm x 0.1cm     Post procedure measurements:  L 5.5cm x W 3.2cm x 0.2cm   Specimen sent for pathology    LABS:  07-24    135  |  92<L>  |  124<H>  ----------------------------<  104<H>  3.3<L>   |  19<L>  |  2.72<H>    Ca    8.2<L>      24 Jul 2019 06:24  Phos  8.5     07-24  Mg     2.2     07-24    TPro  5.2<L>  /  Alb  2.2<L>  /  TBili  0.2  /  DBili  x   /  AST  11  /  ALT  6<L>  /  AlkPhos  77  07-24                          8.1    15.48 )-----------( 343      ( 24 Jul 2019 09:15 )             25.3     PT/INR - ( 24 Jul 2019 06:18 )   PT: 15.8 sec;   INR: 1.37 ratio         PTT - ( 24 Jul 2019 06:18 )  PTT:35.1 sec

## 2019-07-24 NOTE — PROGRESS NOTE ADULT - SUBJECTIVE AND OBJECTIVE BOX
Patient is a 69y old  Female who presented with a chief complaint of PEG tube dislodgement, fever, leukocytosis (24 Jul 2019 12:06)      INTERVAL HPI/OVERNIGHT EVENTS:  feeds restarted - Nepro, low rate  no reported events    MEDICATIONS  (STANDING):  apixaban 2.5 milliGRAM(s) Oral two times a day  artificial tears (preservative free) Ophthalmic Solution 1 Drop(s) Both EYES four times a day  ascorbic acid 500 milliGRAM(s) Oral daily  BACItracin   Ointment 1 Application(s) Topical two times a day  chlorhexidine 2% Cloths 1 Application(s) Topical daily  clonazePAM  Tablet 1 milliGRAM(s) Oral at bedtime  Dakins Solution - 1/4 Strength 1 Application(s) Topical two times a day  dextrose 5%. 1000 milliLiter(s) (50 mL/Hr) IV Continuous <Continuous>  famotidine    Tablet 20 milliGRAM(s) Oral daily  ferrous    sulfate Liquid 300 milliGRAM(s) Enteral Tube daily  formoterol for nebulization 20 MICROGram(s) Nebulizer two times a day  gabapentin   Solution 300 milliGRAM(s) Oral every 12 hours  hydrocortisone sodium succinate Injectable 25 milliGRAM(s) IV Push daily  insulin lispro (HumaLOG) corrective regimen sliding scale   SubCutaneous every 6 hours  levothyroxine Injectable 60 MICROGram(s) IV Push at bedtime  Medical Marijuana Awake Oil 2 milliLiter(s),Medical Marijuana Awake Oil 2 milliLiter(s) 2 milliLiter(s) Enteral Tube <User Schedule>  Medical Marijuana Calm Oil 2 milliLiter(s),Medical Marinjuana Calm Oil 2 milliLiter(s) 2 milliLiter(s) Enteral Tube <User Schedule>  Medical Marijuana [place] Oil 2 milliLiter(s),Medical Marijuana [place] Oil 2 milliLiter(s) 2 milliLiter(s) Enteral Tube <User Schedule>  metoprolol tartrate 12.5 milliGRAM(s) Oral two times a day  multivitamin/minerals/iron Oral Solution (CENTRUM) 15 milliLiter(s) Oral daily  QUEtiapine 25 milliGRAM(s) Oral at bedtime  sodium chloride 0.9%. 1000 milliLiter(s) (75 mL/Hr) IV Continuous <Continuous>  tiZANidine 4 milliGRAM(s) Oral <User Schedule>  zinc sulfate 220 milliGRAM(s) Oral daily    MEDICATIONS  (PRN):  acetaminophen  Suppository .. 650 milliGRAM(s) Rectal every 6 hours PRN Temp greater or equal to 38C (100.4F), Mild Pain (1 - 3)  carboxymethylcellulose 0.5% (Preservative-Free) Ophthalmic Solution 1 Drop(s) Both EYES three times a day PRN dry eyes  glucagon  Injectable 1 milliGRAM(s) IntraMuscular once PRN Glucose LESS THAN 70 milligrams/deciliter  nystatin Powder 1 Application(s) Topical three times a day PRN under breasts      Allergies  ASA; dye contrast (Anaphylaxis)  aspirin (Short breath)  divalproex sodium (Other (Unknown))  Flowers and Plants (Short breath)  Haldol (Other (Unknown))  penicillin (Short breath; Rash)  sulfa drugs (Short breath; Rash)  vancomycin (Rash; Urticaria; Hives)  Xanax (Other (Unknown))        Review of Systems:  unable to obtain    Vital Signs Last 24 Hrs  T(C): 36.6 (24 Jul 2019 09:21), Max: 36.8 (23 Jul 2019 17:00)  T(F): 97.9 (24 Jul 2019 09:21), Max: 98.2 (23 Jul 2019 17:00)  HR: 97 (24 Jul 2019 09:21) (91 - 122)  BP: 144/80 (24 Jul 2019 09:21) (105/66 - 166/82)  BP(mean): --  RR: 18 (24 Jul 2019 09:21) (18 - 20)  SpO2: 97% (24 Jul 2019 09:21) (95% - 98%)    PHYSICAL EXAM:  Constitutional: frail elderly chronically ill appearing +severe contractures, non verbal  eyes open spouse at bedside +anasarca and muscle wasting  Neck: No JVD  Respiratory: + rhonchi , decreased BS bases  Cardiovascular: S1 and S2 regular  Gastrointestinal: BS+, soft, +G tube  high in epigastric area  bumper noted to be at 2 cm richard. 1 piece of gauze under bumper.  Extremities: anasarca/+edema and muscle wasting +dressings in place ++contractures  Vascular: 2+ peripheral pulses  Neurological: lethargic, no focal asymmetry  Psychiatric: non verbal   Skin: anicteric  +skin dressings and heel pad cushions in place  multiple decubiti (wound care following)    LABS:                        8.1    15.48 )-----------( 343      ( 24 Jul 2019 09:15 )             25.3     07-24    135  |  92<L>  |  124<H>  ----------------------------<  104<H>  3.3<L>   |  19<L>  |  2.72<H>    Ca    8.2<L>      24 Jul 2019 06:24  Phos  8.5     07-24  Mg     2.2     07-24    TPro  5.2<L>  /  Alb  2.2<L>  /  TBili  0.2  /  DBili  x   /  AST  11  /  ALT  6<L>  /  AlkPhos  77  07-24    PT/INR - ( 24 Jul 2019 06:18 )   PT: 15.8 sec;   INR: 1.37 ratio         PTT - ( 24 Jul 2019 06:18 )  PTT:35.1 sec        RADIOLOGY & ADDITIONAL TESTS:

## 2019-07-24 NOTE — PROGRESS NOTE ADULT - ASSESSMENT
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  Fever and leukocytosis now. Abnormal CT chest.    , patient found with hypoxia and AMS suspect from aspiration event.            aspiration/pna        unresponsive today  reculture for signs  of infection        completed meropenem   at high risk of reaspiration  and decub related sepsis . last rrp was not likely due to sepsis  continues to do poorly but renal function stable  reculture prn  discussed with arlene

## 2019-07-24 NOTE — PROGRESS NOTE ADULT - ASSESSMENT
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 Since admission, went to IR to have PEG replaced (6/25/19)    s/p RRT on 7/13 for hypotension in the SBP 50-60s and hypoxia in the 80s, admitted to MICU for shock requiring IV pressor support. Now stable off pressors, on NC. On stress steroids dosing  DVT on AC  s/p RRT for hypotension 7/21- 7/22 overnight  Anemia - without overt/brisk GI bleed.     RECS:  -keep G tube bumper at ~2-3 cm richard on G tube.  -G tube feeds as ordered. Renal input noted, continue on Nepro for now  -wound care  -ID following  -monitor BMs  -Renal following  -Continue Pepcid  -Monitor BMs  -epogen as per Renal  -steroids per primary team    Adam Miller PA-C    [facility]  [phone]  After hours and weekend coverage [phone]

## 2019-07-24 NOTE — PROGRESS NOTE ADULT - ASSESSMENT
Patient is 69 year-old woman with advanced dementia and functional quadriplegia presents with fevers, leukocytosis in the setting of PEG dislodgement. Patient with acute kidney injury that is likely multifactorial.   Patient still appears total volume overloaded with +JVD and diffuse edema/anasarca in the setting of worsening renal function. Suspect some of peripheral edema is due to lack of movement.    Antibiotics per ID.  PEG management and feeds per GI/nutrition.    Appreciate nephrology input: currently off Lasix gtt.    Trend daily labs. Avoid nephrotoxic agents.

## 2019-07-24 NOTE — PROGRESS NOTE ADULT - SUBJECTIVE AND OBJECTIVE BOX
HPI:  Patient seen and examined with her daughters at bedside on 6 Ravinder.  Patient with interval improvement in creatinine, although BUN remains markedly elevated.    Review Of Systems:  Unable to assess in the setting of advanced dementia.    Medications:  acetaminophen  Suppository .. 650 milliGRAM(s) Rectal every 6 hours PRN  ALBUTerol/ipratropium for Nebulization 3 milliLiter(s) Nebulizer every 6 hours  apixaban 2.5 milliGRAM(s) Oral two times a day  artificial tears (preservative free) Ophthalmic Solution 1 Drop(s) Both EYES four times a day  ascorbic acid 500 milliGRAM(s) Oral daily  BACItracin   Ointment 1 Application(s) Topical two times a day  carboxymethylcellulose 0.5% (Preservative-Free) Ophthalmic Solution 1 Drop(s) Both EYES three times a day PRN  chlorhexidine 2% Cloths 1 Application(s) Topical daily  clonazePAM  Tablet 1 milliGRAM(s) Oral at bedtime  Dakins Solution - 1/4 Strength 1 Application(s) Topical two times a day  dextrose 5%. 1000 milliLiter(s) IV Continuous <Continuous>  famotidine    Tablet 20 milliGRAM(s) Oral daily  ferrous    sulfate Liquid 300 milliGRAM(s) Enteral Tube daily  formoterol for nebulization 20 MICROGram(s) Nebulizer two times a day  gabapentin   Solution 300 milliGRAM(s) Oral every 12 hours  glucagon  Injectable 1 milliGRAM(s) IntraMuscular once PRN  hydrocortisone sodium succinate Injectable 25 milliGRAM(s) IV Push daily  insulin lispro (HumaLOG) corrective regimen sliding scale   SubCutaneous every 6 hours  levothyroxine Injectable 60 MICROGram(s) IV Push at bedtime  Medical Marijuana Awake Oil 2 milliLiter(s),Medical Marijuana Awake Oil 2 milliLiter(s) 2 milliLiter(s) Enteral Tube <User Schedule>  Medical Marijuana Calm Oil 2 milliLiter(s),Medical Marinjuana Calm Oil 2 milliLiter(s) 2 milliLiter(s) Enteral Tube <User Schedule>  Medical Marijuana [place] Oil 2 milliLiter(s),Medical Marijuana [place] Oil 2 milliLiter(s) 2 milliLiter(s) Enteral Tube <User Schedule>  metoprolol tartrate 12.5 milliGRAM(s) Oral two times a day  multivitamin/minerals/iron Oral Solution (CENTRUM) 15 milliLiter(s) Oral daily  nystatin Powder 1 Application(s) Topical three times a day PRN  QUEtiapine 25 milliGRAM(s) Oral at bedtime  sodium chloride 0.9%. 1000 milliLiter(s) IV Continuous <Continuous>  tiZANidine 4 milliGRAM(s) Oral <User Schedule>  zinc sulfate 220 milliGRAM(s) Oral daily    PAST MEDICAL & SURGICAL HISTORY:  Type 2 diabetes mellitus  Dementia  DVT, lower extremity  CVA (cerebral vascular accident)  Fatty pancreas  PNA (pneumonia)  Pulmonary HTN  IGT (impaired glucose tolerance)  Ulcerative colitis  Acid reflux  Anxiety  Depression  Mouth sores  HLD (hyperlipidemia)  Asthma  S/P percutaneous endoscopic gastrostomy (PEG) tube placement: for dysphagia  Humeral head fracture  H/O: hysterectomy  H/O cataract extraction, left  History of knee replacement    Vitals:  T(C): 36.8 (07-24-19 @ 21:00), Max: 36.8 (07-24-19 @ 21:00)  HR: 109 (07-24-19 @ 21:00) (91 - 122)  BP: 153/87 (07-24-19 @ 21:00) (105/66 - 166/82)  BP(mean): --  RR: 18 (07-24-19 @ 21:00) (18 - 19)  SpO2: 95% (07-24-19 @ 21:00) (92% - 97%)  Wt(kg): --  Daily     Daily   I&O's Summary    23 Jul 2019 07:01  -  24 Jul 2019 07:00  --------------------------------------------------------  IN: 715 mL / OUT: 1070 mL / NET: -355 mL    24 Jul 2019 07:01  -  24 Jul 2019 22:15  --------------------------------------------------------  IN: 775 mL / OUT: 200 mL / NET: 575 mL        Physical Exam:  Appearance: functional quadriplegia   Eyes: PERRLA, EOMI, pink conjunctiva, no scleral icterus   HENT: Normal oral mucosa  Cardiovascular: tachycardic S1, S2, no murmur, rub, or gallop; NO edema in hands or feet; no JVD  Procedural Access Site: Clean, dry, intact, without hematoma  Respiratory: bilateral air entry; poor inspiratory effort  Gastrointestinal: Soft, non-tender, non-distended, BS+, +PEG  Musculoskeletal: +contracted  Neurologic: functional quadriplegia   Lymphatic: No lymphadenopathy  Psychiatry: advanced dementia  Skin: multiple sites of skin breakdown                          8.1    15.48 )-----------( 343      ( 24 Jul 2019 09:15 )             25.3     07-24    135  |  92<L>  |  124<H>  ----------------------------<  104<H>  3.3<L>   |  19<L>  |  2.72<H>    Ca    8.2<L>      24 Jul 2019 06:24  Phos  8.5     07-24  Mg     2.2     07-24    TPro  5.2<L>  /  Alb  2.2<L>  /  TBili  0.2  /  DBili  x   /  AST  11  /  ALT  6<L>  /  AlkPhos  77  07-24    PT/INR - ( 24 Jul 2019 06:18 )   PT: 15.8 sec;   INR: 1.37 ratio         PTT - ( 24 Jul 2019 06:18 )  PTT:35.1 sec

## 2019-07-25 LAB
ANION GAP SERPL CALC-SCNC: 24 MMOL/L — HIGH (ref 5–17)
BUN SERPL-MCNC: 127 MG/DL — HIGH (ref 7–23)
CALCIUM SERPL-MCNC: 8.1 MG/DL — LOW (ref 8.4–10.5)
CHLORIDE SERPL-SCNC: 94 MMOL/L — LOW (ref 96–108)
CO2 SERPL-SCNC: 21 MMOL/L — LOW (ref 22–31)
CREAT SERPL-MCNC: 3.04 MG/DL — HIGH (ref 0.5–1.3)
GLUCOSE BLDC GLUCOMTR-MCNC: 136 MG/DL — HIGH (ref 70–99)
GLUCOSE BLDC GLUCOMTR-MCNC: 150 MG/DL — HIGH (ref 70–99)
GLUCOSE BLDC GLUCOMTR-MCNC: 229 MG/DL — HIGH (ref 70–99)
GLUCOSE SERPL-MCNC: 113 MG/DL — HIGH (ref 70–99)
HCT VFR BLD CALC: 24.3 % — LOW (ref 34.5–45)
HGB BLD-MCNC: 7.7 G/DL — LOW (ref 11.5–15.5)
MCHC RBC-ENTMCNC: 27.9 PG — SIGNIFICANT CHANGE UP (ref 27–34)
MCHC RBC-ENTMCNC: 31.7 GM/DL — LOW (ref 32–36)
MCV RBC AUTO: 88 FL — SIGNIFICANT CHANGE UP (ref 80–100)
PLATELET # BLD AUTO: 289 K/UL — SIGNIFICANT CHANGE UP (ref 150–400)
POTASSIUM SERPL-MCNC: 3.4 MMOL/L — LOW (ref 3.5–5.3)
POTASSIUM SERPL-SCNC: 3.4 MMOL/L — LOW (ref 3.5–5.3)
RBC # BLD: 2.76 M/UL — LOW (ref 3.8–5.2)
RBC # FLD: 15.3 % — HIGH (ref 10.3–14.5)
SODIUM SERPL-SCNC: 139 MMOL/L — SIGNIFICANT CHANGE UP (ref 135–145)
WBC # BLD: 13.98 K/UL — HIGH (ref 3.8–10.5)
WBC # FLD AUTO: 13.98 K/UL — HIGH (ref 3.8–10.5)

## 2019-07-25 PROCEDURE — 99232 SBSQ HOSP IP/OBS MODERATE 35: CPT

## 2019-07-25 RX ORDER — FUROSEMIDE 40 MG
40 TABLET ORAL ONCE
Refills: 0 | Status: COMPLETED | OUTPATIENT
Start: 2019-07-25 | End: 2019-07-25

## 2019-07-25 RX ORDER — SEVELAMER CARBONATE 2400 MG/1
800 POWDER, FOR SUSPENSION ORAL
Refills: 0 | Status: DISCONTINUED | OUTPATIENT
Start: 2019-07-25 | End: 2019-08-07

## 2019-07-25 RX ORDER — ERYTHROPOIETIN 10000 [IU]/ML
10000 INJECTION, SOLUTION INTRAVENOUS; SUBCUTANEOUS ONCE
Refills: 0 | Status: COMPLETED | OUTPATIENT
Start: 2019-07-25 | End: 2019-07-25

## 2019-07-25 RX ORDER — POTASSIUM CHLORIDE 20 MEQ
10 PACKET (EA) ORAL
Refills: 0 | Status: COMPLETED | OUTPATIENT
Start: 2019-07-25 | End: 2019-07-25

## 2019-07-25 RX ADMIN — ZINC SULFATE TAB 220 MG (50 MG ZINC EQUIVALENT) 220 MILLIGRAM(S): 220 (50 ZN) TAB at 12:07

## 2019-07-25 RX ADMIN — Medication 12.5 MILLIGRAM(S): at 18:39

## 2019-07-25 RX ADMIN — Medication 60 MICROGRAM(S): at 22:06

## 2019-07-25 RX ADMIN — Medication 1 APPLICATION(S): at 18:37

## 2019-07-25 RX ADMIN — Medication 1 APPLICATION(S): at 18:38

## 2019-07-25 RX ADMIN — Medication 12.5 MILLIGRAM(S): at 05:46

## 2019-07-25 RX ADMIN — TIZANIDINE 4 MILLIGRAM(S): 4 TABLET ORAL at 10:15

## 2019-07-25 RX ADMIN — TIZANIDINE 4 MILLIGRAM(S): 4 TABLET ORAL at 21:01

## 2019-07-25 RX ADMIN — Medication 3 MILLILITER(S): at 05:47

## 2019-07-25 RX ADMIN — Medication 15 MILLILITER(S): at 12:55

## 2019-07-25 RX ADMIN — Medication 2: at 12:56

## 2019-07-25 RX ADMIN — ERYTHROPOIETIN 10000 UNIT(S): 10000 INJECTION, SOLUTION INTRAVENOUS; SUBCUTANEOUS at 13:05

## 2019-07-25 RX ADMIN — SEVELAMER CARBONATE 800 MILLIGRAM(S): 2400 POWDER, FOR SUSPENSION ORAL at 12:56

## 2019-07-25 RX ADMIN — Medication 300 MILLIGRAM(S): at 12:10

## 2019-07-25 RX ADMIN — APIXABAN 2.5 MILLIGRAM(S): 2.5 TABLET, FILM COATED ORAL at 05:46

## 2019-07-25 RX ADMIN — Medication 3 MILLILITER(S): at 18:36

## 2019-07-25 RX ADMIN — FAMOTIDINE 20 MILLIGRAM(S): 10 INJECTION INTRAVENOUS at 12:07

## 2019-07-25 RX ADMIN — SEVELAMER CARBONATE 800 MILLIGRAM(S): 2400 POWDER, FOR SUSPENSION ORAL at 18:38

## 2019-07-25 RX ADMIN — Medication 20 MICROGRAM(S): at 05:47

## 2019-07-25 RX ADMIN — Medication 20 MICROGRAM(S): at 18:35

## 2019-07-25 RX ADMIN — Medication 1 APPLICATION(S): at 05:47

## 2019-07-25 RX ADMIN — Medication 1 DROP(S): at 05:46

## 2019-07-25 RX ADMIN — APIXABAN 2.5 MILLIGRAM(S): 2.5 TABLET, FILM COATED ORAL at 18:38

## 2019-07-25 RX ADMIN — Medication 1 MILLIGRAM(S): at 22:06

## 2019-07-25 RX ADMIN — Medication 3 MILLILITER(S): at 12:06

## 2019-07-25 RX ADMIN — Medication 100 MILLIEQUIVALENT(S): at 11:20

## 2019-07-25 RX ADMIN — Medication 1 DROP(S): at 12:07

## 2019-07-25 RX ADMIN — Medication 100 MILLIEQUIVALENT(S): at 16:28

## 2019-07-25 RX ADMIN — Medication 1 DROP(S): at 12:55

## 2019-07-25 RX ADMIN — Medication 3 MILLILITER(S): at 00:37

## 2019-07-25 RX ADMIN — QUETIAPINE FUMARATE 25 MILLIGRAM(S): 200 TABLET, FILM COATED ORAL at 22:06

## 2019-07-25 RX ADMIN — Medication 500 MILLIGRAM(S): at 12:07

## 2019-07-25 RX ADMIN — CHLORHEXIDINE GLUCONATE 1 APPLICATION(S): 213 SOLUTION TOPICAL at 12:12

## 2019-07-25 RX ADMIN — Medication 1 DROP(S): at 18:37

## 2019-07-25 RX ADMIN — Medication 40 MILLIGRAM(S): at 13:04

## 2019-07-25 RX ADMIN — GABAPENTIN 300 MILLIGRAM(S): 400 CAPSULE ORAL at 10:16

## 2019-07-25 RX ADMIN — GABAPENTIN 300 MILLIGRAM(S): 400 CAPSULE ORAL at 21:01

## 2019-07-25 RX ADMIN — Medication 25 MILLIGRAM(S): at 05:46

## 2019-07-25 RX ADMIN — Medication 100 MILLIEQUIVALENT(S): at 12:56

## 2019-07-25 NOTE — PROGRESS NOTE ADULT - SUBJECTIVE AND OBJECTIVE BOX
PULMONARY PROGRESS NOTE    MYRIAM HEATH  MRN-[id number]    Patient is a 69y old  Female who presents with a chief complaint of PEG tube dislodgement, fever, leukocytosis (25 Jul 2019 10:47)      HPI:  -  -    ROS:   -    ACTIVE MEDICATION LIST:  MEDICATIONS  (STANDING):  ALBUTerol/ipratropium for Nebulization 3 milliLiter(s) Nebulizer every 6 hours  apixaban 2.5 milliGRAM(s) Oral two times a day  artificial tears (preservative free) Ophthalmic Solution 1 Drop(s) Both EYES four times a day  ascorbic acid 500 milliGRAM(s) Oral daily  BACItracin   Ointment 1 Application(s) Topical two times a day  chlorhexidine 2% Cloths 1 Application(s) Topical daily  clonazePAM  Tablet 1 milliGRAM(s) Oral at bedtime  Dakins Solution - 1/4 Strength 1 Application(s) Topical two times a day  dextrose 5%. 1000 milliLiter(s) (50 mL/Hr) IV Continuous <Continuous>  famotidine    Tablet 20 milliGRAM(s) Oral daily  ferrous    sulfate Liquid 300 milliGRAM(s) Enteral Tube daily  formoterol for nebulization 20 MICROGram(s) Nebulizer two times a day  gabapentin   Solution 300 milliGRAM(s) Oral every 12 hours  hydrocortisone sodium succinate Injectable 25 milliGRAM(s) IV Push daily  insulin lispro (HumaLOG) corrective regimen sliding scale   SubCutaneous every 6 hours  levothyroxine Injectable 60 MICROGram(s) IV Push at bedtime  Medical Marijuana Awake Oil 2 milliLiter(s),Medical Marijuana Awake Oil 2 milliLiter(s) 2 milliLiter(s) Enteral Tube <User Schedule>  Medical Marijuana Calm Oil 2 milliLiter(s),Medical [place] Calm Oil 2 milliLiter(s) 2 milliLiter(s) Enteral Tube <User Schedule>  Medical Marijuana [place] Oil 2 milliLiter(s),Medical Marijuana [place] Oil 2 milliLiter(s) 2 milliLiter(s) Enteral Tube <User Schedule>  metoprolol tartrate 12.5 milliGRAM(s) Oral two times a day  multivitamin/minerals/iron Oral Solution (CENTRUM) 15 milliLiter(s) Oral daily  potassium chloride  10 mEq/100 mL IVPB 10 milliEquivalent(s) IV Intermittent every 1 hour  QUEtiapine 25 milliGRAM(s) Oral at bedtime  sodium chloride 0.9%. 1000 milliLiter(s) (75 mL/Hr) IV Continuous <Continuous>  tiZANidine 4 milliGRAM(s) Oral <User Schedule>  zinc sulfate 220 milliGRAM(s) Oral daily    MEDICATIONS  (PRN):  acetaminophen  Suppository .. 650 milliGRAM(s) Rectal every 6 hours PRN Temp greater or equal to 38C (100.4F), Mild Pain (1 - 3)  carboxymethylcellulose 0.5% (Preservative-Free) Ophthalmic Solution 1 Drop(s) Both EYES three times a day PRN dry eyes  glucagon  Injectable 1 milliGRAM(s) IntraMuscular once PRN Glucose LESS THAN 70 milligrams/deciliter  nystatin Powder 1 Application(s) Topical three times a day PRN under breasts      EXAM:  Vital Signs Last 24 Hrs  T(C): 36.6 (25 Jul 2019 09:02), Max: 36.8 (24 Jul 2019 21:00)  T(F): 97.9 (25 Jul 2019 09:02), Max: 98.2 (24 Jul 2019 21:00)  HR: 105 (25 Jul 2019 09:02) (97 - 109)  BP: 133/76 (25 Jul 2019 09:02) (128/72 - 153/87)  BP(mean): --  RR: 18 (25 Jul 2019 09:02) (18 - 18)  SpO2: 98% (25 Jul 2019 09:02) (92% - 100%)    GENERAL: The patient is awake and alert in no apparent distress.     LUNGS: Clear to auscultation without wheezing, rales or rhonchi; respirations unlabored    HEART: Regular rate and rhythm without murmur.                            7.7    13.98 )-----------( 289      ( 25 Jul 2019 07:56 )             24.3       07-25    139  |  94<L>  |  127<H>  ----------------------------<  113<H>  3.4<L>   |  21<L>  |  3.04<H>    Ca    8.1<L>      25 Jul 2019 06:47  Phos  8.5     07-24  Mg     2.2     07-24    TPro  5.2<L>  /  Alb  2.2<L>  /  TBili  0.2  /  DBili  x   /  AST  11  /  ALT  6<L>  /  AlkPhos  77  07-24        PROBLEM LIST:  69y Female with HEALTH ISSUES - PROBLEM Dx:  Fever: Fever  Type 2 diabetes mellitus without complication, without long-term current use of insulin:   Make sure you get your HgA1c checked every three months.  If you take oral diabetes medications, check your blood glucose two times a day.  If you take insulin, check your blood glucose before meals and at bedtime.  It&#x27;s important not to skip any meals.  Keep a log of your blood glucose results and always take it with you to your doctor appointments.  Keep a list of your current medications including injectables and over the counter medications and bring this medication list with you to all your doctor appointments.  If you have not seen your ophthalmologist this year call for appointment.  Check your feet daily for redness, sores, or openings. Do not self treat. If no improvement in two days call your primary care physician for an appointment.  Low blood sugar (hypoglycemia) is a blood sugar below 70mg/dl. Check your blood sugar if you feel signs/symptoms of hypoglycemia. If your blood sugar is below 70 take 15 grams of carbohydrates (ex 4 oz of apple juice, 3-4 glucose tablets, or 4-6 oz of regular soda) wait 15 minutes and repeat blood sugar to make sure it comes up above 70.  If your blood sugar is above 70 and you are due for a meal, have a meal.  If you are not due for a meal have a snack.  This snack helps keeps your blood sugar at a safe range.    Pressure injury of skin of sacral region, unspecified injury stage: Continue with wound care as instructed.   Maintain adequate nutrition, frequent repositioning , offloading with Zfloat boots.  Follow-up with your primary care physician and Wound Care Specialist within 1 week. Call for appointment.    Chronic deep vein thrombosis (DVT) of right lower extremity, unspecified vein: Take your &quot;blood thinners&quot; as prescribed.  Walking is encouraged, increase activity as tolerated.  If you develop new leg pain, swelling, and/or redness contact your healthcare provider.  If you develop new chest pain with difficulty breathing, a rapid heart rate and/or a feeling of passing out call emergency medical services 911.    Prophylactic measure: Prophylactic measure  Dementia without behavioral disturbance, unspecified dementia type: Continue with medications as prescribed by your doctor.  Maintain safe environment.  Maintain adequate nutrition, hydration.  Follow-up with your primary care physician within 1 week. Call for appointment.    Uncomplicated asthma, unspecified asthma severity, unspecified whether persistent: Stable.  Follow-up with your primary care physician within 1 week. Call for appointment.    Sepsis, due to unspecified organism: Take all antibiotics as ordered.  Call you Health care provider upon arrival home to make a one week follow up appointment.  If you develop fever, chills, malaise, or change in mental status call your Health Care Provider or go to the Emergency Department.  Nutrition is important, eat small frequent meals to help ensure you get adequate calories.  Do not stay in bed all day!  Increase your activity daily as tolerated.    PEG tube malfunction: PEG tube malfunction            RECS:        Please call with any questions.    Leigh Ann Resendez DO  [facility] Pulmonary/Sleep Medicine  [phone] PULMONARY PROGRESS NOTE    MYRIAM HEATH  MRN-[id number]    Patient is a 69y old  Female who presents with a chief complaint of PEG tube dislodgement, fever, leukocytosis (25 Jul 2019 10:47)      HPI:  ulcer left nare  switched to ventimask for 02    ROS:   -    ACTIVE MEDICATION LIST:  MEDICATIONS  (STANDING):  ALBUTerol/ipratropium for Nebulization 3 milliLiter(s) Nebulizer every 6 hours  apixaban 2.5 milliGRAM(s) Oral two times a day  artificial tears (preservative free) Ophthalmic Solution 1 Drop(s) Both EYES four times a day  ascorbic acid 500 milliGRAM(s) Oral daily  BACItracin   Ointment 1 Application(s) Topical two times a day  chlorhexidine 2% Cloths 1 Application(s) Topical daily  clonazePAM  Tablet 1 milliGRAM(s) Oral at bedtime  Dakins Solution - 1/4 Strength 1 Application(s) Topical two times a day  dextrose 5%. 1000 milliLiter(s) (50 mL/Hr) IV Continuous <Continuous>  famotidine    Tablet 20 milliGRAM(s) Oral daily  ferrous    sulfate Liquid 300 milliGRAM(s) Enteral Tube daily  formoterol for nebulization 20 MICROGram(s) Nebulizer two times a day  gabapentin   Solution 300 milliGRAM(s) Oral every 12 hours  hydrocortisone sodium succinate Injectable 25 milliGRAM(s) IV Push daily  insulin lispro (HumaLOG) corrective regimen sliding scale   SubCutaneous every 6 hours  levothyroxine Injectable 60 MICROGram(s) IV Push at bedtime  Medical Marijuana Awake Oil 2 milliLiter(s),Medical Marijuana Awake Oil 2 milliLiter(s) 2 milliLiter(s) Enteral Tube <User Schedule>  Medical Marijuana Calm Oil 2 milliLiter(s),Medical [place]ju[place] Calm Oil 2 milliLiter(s) 2 milliLiter(s) Enteral Tube <User Schedule>  Medical Marijuana [place] Oil 2 milliLiter(s),Medical Marijuana [place] Oil 2 milliLiter(s) 2 milliLiter(s) Enteral Tube <User Schedule>  metoprolol tartrate 12.5 milliGRAM(s) Oral two times a day  multivitamin/minerals/iron Oral Solution (CENTRUM) 15 milliLiter(s) Oral daily  potassium chloride  10 mEq/100 mL IVPB 10 milliEquivalent(s) IV Intermittent every 1 hour  QUEtiapine 25 milliGRAM(s) Oral at bedtime  sodium chloride 0.9%. 1000 milliLiter(s) (75 mL/Hr) IV Continuous <Continuous>  tiZANidine 4 milliGRAM(s) Oral <User Schedule>  zinc sulfate 220 milliGRAM(s) Oral daily    MEDICATIONS  (PRN):  acetaminophen  Suppository .. 650 milliGRAM(s) Rectal every 6 hours PRN Temp greater or equal to 38C (100.4F), Mild Pain (1 - 3)  carboxymethylcellulose 0.5% (Preservative-Free) Ophthalmic Solution 1 Drop(s) Both EYES three times a day PRN dry eyes  glucagon  Injectable 1 milliGRAM(s) IntraMuscular once PRN Glucose LESS THAN 70 milligrams/deciliter  nystatin Powder 1 Application(s) Topical three times a day PRN under breasts      EXAM:  Vital Signs Last 24 Hrs  T(C): 36.6 (25 Jul 2019 09:02), Max: 36.8 (24 Jul 2019 21:00)  T(F): 97.9 (25 Jul 2019 09:02), Max: 98.2 (24 Jul 2019 21:00)  HR: 105 (25 Jul 2019 09:02) (97 - 109)  BP: 133/76 (25 Jul 2019 09:02) (128/72 - 153/87)  BP(mean): --  RR: 18 (25 Jul 2019 09:02) (18 - 18)  SpO2: 98% (25 Jul 2019 09:02) (92% - 100%)    GENERAL: The patient is not awake; minimally responsive to verbal stimuli     LUNGS: Clear to auscultation without wheezing anterior lung fields    HEART: Regular rate and rhythm without murmur.                            7.7    13.98 )-----------( 289      ( 25 Jul 2019 07:56 )             24.3       07-25    139  |  94<L>  |  127<H>  ----------------------------<  113<H>  3.4<L>   |  21<L>  |  3.04<H>    Ca    8.1<L>      25 Jul 2019 06:47  Phos  8.5     07-24  Mg     2.2     07-24    TPro  5.2<L>  /  Alb  2.2<L>  /  TBili  0.2  /  DBili  x   /  AST  11  /  ALT  6<L>  /  AlkPhos  77  07-24    < from: Xray Chest 1 View- PORTABLE-Routine (07.23.19 @ 11:27) >  EXAM:  XR CHEST PORTABLE ROUTINE 1V                            PROCEDURE DATE:  07/23/2019            INTERPRETATION:  Clinical indication: Cough.    Technique: Single view AP chest radiograph.    Comparison: Chest x-ray from 7/21/2019.    Findings:  Right central line, tip is not visualized.  Heart size cannot be assessed in single projection.  Left pleural effusion. The right lung is clear.    Impression:  Left pleural effusion.                RONEY JACOBS M.D., RADIOLOGY RESIDENT  This document has been electronically signed.  CHASITY BANERJEE M.D., ATTENDING RADIOLOGIST  This document has been electronically signed. Jul 23 2019  2:30PM                < end of copied text >      PROBLEM LIST:  69y Female with HEALTH ISSUES - PROBLEM Dx:  Fever: Fever  Type 2 diabetes mellitus without complication, without long-term current use of insulin:   Make sure you get your HgA1c checked every three months.  If you take oral diabetes medications, check your blood glucose two times a day.  If you take insulin, check your blood glucose before meals and at bedtime.  It&#x27;s important not to skip any meals.  Keep a log of your blood glucose results and always take it with you to your doctor appointments.  Keep a list of your current medications including injectables and over the counter medications and bring this medication list with you to all your doctor appointments.  If you have not seen your ophthalmologist this year call for appointment.  Check your feet daily for redness, sores, or openings. Do not self treat. If no improvement in two days call your primary care physician for an appointment.  Low blood sugar (hypoglycemia) is a blood sugar below 70mg/dl. Check your blood sugar if you feel signs/symptoms of hypoglycemia. If your blood sugar is below 70 take 15 grams of carbohydrates (ex 4 oz of apple juice, 3-4 glucose tablets, or 4-6 oz of regular soda) wait 15 minutes and repeat blood sugar to make sure it comes up above 70.  If your blood sugar is above 70 and you are due for a meal, have a meal.  If you are not due for a meal have a snack.  This snack helps keeps your blood sugar at a safe range.    Pressure injury of skin of sacral region, unspecified injury stage: Continue with wound care as instructed.   Maintain adequate nutrition, frequent repositioning , offloading with Zfloat boots.  Follow-up with your primary care physician and Wound Care Specialist within 1 week. Call for appointment.    Chronic deep vein thrombosis (DVT) of right lower extremity, unspecified vein: Take your &quot;blood thinners&quot; as prescribed.  Walking is encouraged, increase activity as tolerated.  If you develop new leg pain, swelling, and/or redness contact your healthcare provider.  If you develop new chest pain with difficulty breathing, a rapid heart rate and/or a feeling of passing out call emergency medical services 911.    Prophylactic measure: Prophylactic measure  Dementia without behavioral disturbance, unspecified dementia type: Continue with medications as prescribed by your doctor.  Maintain safe environment.  Maintain adequate nutrition, hydration.  Follow-up with your primary care physician within 1 week. Call for appointment.    Uncomplicated asthma, unspecified asthma severity, unspecified whether persistent: Stable.  Follow-up with your primary care physician within 1 week. Call for appointment.    Sepsis, due to unspecified organism: Take all antibiotics as ordered.  Call you Health care provider upon arrival home to make a one week follow up appointment.  If you develop fever, chills, malaise, or change in mental status call your Health Care Provider or go to the Emergency Department.  Nutrition is important, eat small frequent meals to help ensure you get adequate calories.  Do not stay in bed all day!  Increase your activity daily as tolerated.    PEG tube malfunction: PEG tube malfunction            RECS:  continue 02  neb   chest PT  supportive care  prednisone 50mg to start tomorrow      Please call with any questions.    Leigh Ann Resendez, DO  [facility] Pulmonary/Sleep Medicine  [phone]

## 2019-07-25 NOTE — PROGRESS NOTE ADULT - ASSESSMENT
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 since admission, went to IR to have PEG replaced.  TRANG     1. TRANG likely multifactorial with Creatinine slightly up but urination is >.5cc/kg  2.  Anemia; Hgb low; initial Hgb <7; repeat slightly better though <goal   3.  Hypokalemia;hypophosphatemia    RECOMMEND:  - In next 24-48 hours will change the feeds back to glucerna.  For today leave on Nepro   - IV Lasix 40mg ivp x 1    - Maintain gavin catheter(chronic gavin)   - Strict I & Os  - Procrit 10000 x 1   -revenla powder tid

## 2019-07-25 NOTE — PROGRESS NOTE ADULT - SUBJECTIVE AND OBJECTIVE BOX
HPI:  Patient seen and examined on 6 Monti with her daughter and aide at bedside.    Review Of Systems:  Unable to assess in the setting of advanced dementia.    Medications:  acetaminophen  Suppository .. 650 milliGRAM(s) Rectal every 6 hours PRN  ALBUTerol/ipratropium for Nebulization 3 milliLiter(s) Nebulizer every 6 hours  apixaban 2.5 milliGRAM(s) Oral two times a day  artificial tears (preservative free) Ophthalmic Solution 1 Drop(s) Both EYES four times a day  ascorbic acid 500 milliGRAM(s) Oral daily  BACItracin   Ointment 1 Application(s) Topical two times a day  carboxymethylcellulose 0.5% (Preservative-Free) Ophthalmic Solution 1 Drop(s) Both EYES three times a day PRN  chlorhexidine 2% Cloths 1 Application(s) Topical daily  clonazePAM  Tablet 1 milliGRAM(s) Oral at bedtime  Dakins Solution - 1/4 Strength 1 Application(s) Topical two times a day  dextrose 5%. 1000 milliLiter(s) IV Continuous <Continuous>  famotidine    Tablet 20 milliGRAM(s) Oral daily  ferrous    sulfate Liquid 300 milliGRAM(s) Enteral Tube daily  formoterol for nebulization 20 MICROGram(s) Nebulizer two times a day  gabapentin   Solution 300 milliGRAM(s) Oral every 12 hours  glucagon  Injectable 1 milliGRAM(s) IntraMuscular once PRN  hydrocortisone sodium succinate Injectable 25 milliGRAM(s) IV Push daily  insulin lispro (HumaLOG) corrective regimen sliding scale   SubCutaneous every 6 hours  levothyroxine Injectable 60 MICROGram(s) IV Push at bedtime  Medical Marijuana Awake Oil 2 milliLiter(s),Medical Marijuana Awake Oil 2 milliLiter(s) 2 milliLiter(s) Enteral Tube <User Schedule>  Medical Marijuana Calm Oil 2 milliLiter(s),Medical [place]ju[place] Calm Oil 2 milliLiter(s) 2 milliLiter(s) Enteral Tube <User Schedule>  Medical Marijuana [place] Oil 2 milliLiter(s),Medical Marijuana [place] Oil 2 milliLiter(s) 2 milliLiter(s) Enteral Tube <User Schedule>  metoprolol tartrate 12.5 milliGRAM(s) Oral two times a day  multivitamin/minerals/iron Oral Solution (CENTRUM) 15 milliLiter(s) Oral daily  nystatin Powder 1 Application(s) Topical three times a day PRN  potassium chloride  10 mEq/100 mL IVPB 10 milliEquivalent(s) IV Intermittent every 1 hour  QUEtiapine 25 milliGRAM(s) Oral at bedtime  sevelamer carbonate Powder 800 milliGRAM(s) Oral three times a day with meals  sodium chloride 0.9%. 1000 milliLiter(s) IV Continuous <Continuous>  tiZANidine 4 milliGRAM(s) Oral <User Schedule>  zinc sulfate 220 milliGRAM(s) Oral daily    PAST MEDICAL & SURGICAL HISTORY:  Type 2 diabetes mellitus  Dementia  DVT, lower extremity  CVA (cerebral vascular accident)  Fatty pancreas  PNA (pneumonia)  Pulmonary HTN  IGT (impaired glucose tolerance)  Ulcerative colitis  Acid reflux  Anxiety  Depression  Mouth sores  HLD (hyperlipidemia)  Asthma  S/P percutaneous endoscopic gastrostomy (PEG) tube placement: for dysphagia  Humeral head fracture  H/O: hysterectomy  H/O cataract extraction, left  History of knee replacement    Vitals:  T(C): 36.5 (07-25-19 @ 15:00), Max: 36.8 (07-24-19 @ 21:00)  HR: 107 (07-25-19 @ 15:00) (98 - 109)  BP: 128/66 (07-25-19 @ 15:00) (128/66 - 153/87)  BP(mean): --  RR: 18 (07-25-19 @ 15:00) (18 - 18)  SpO2: 98% (07-25-19 @ 15:00) (92% - 100%)  Wt(kg): --  Daily     Daily   I&O's Summary    24 Jul 2019 07:01  -  25 Jul 2019 07:00  --------------------------------------------------------  IN: 1475 mL / OUT: 600 mL / NET: 875 mL    25 Jul 2019 07:01  -  25 Jul 2019 16:14  --------------------------------------------------------  IN: 175 mL / OUT: 210 mL / NET: -35 mL        Physical Exam:  Appearance: functional quadriplegia   Eyes: PERRLA, EOMI, pink conjunctiva, no scleral icterus   HENT: Normal oral mucosa  Cardiovascular: tachycardic S1, S2, no murmur, rub, or gallop; NO edema in hands or feet; no JVD  Procedural Access Site: Clean, dry, intact, without hematoma  Respiratory: bilateral air entry; poor inspiratory effort  Gastrointestinal: Soft, non-tender, non-distended, BS+, +PEG  Musculoskeletal: +contracted  Neurologic: functional quadriplegia   Lymphatic: No lymphadenopathy  Psychiatry: advanced dementia  Skin: multiple sites of skin breakdown                        7.7    13.98 )-----------( 289      ( 25 Jul 2019 07:56 )             24.3     07-25    139  |  94<L>  |  127<H>  ----------------------------<  113<H>  3.4<L>   |  21<L>  |  3.04<H>    Ca    8.1<L>      25 Jul 2019 06:47  Phos  8.5     07-24  Mg     2.2     07-24    TPro  5.2<L>  /  Alb  2.2<L>  /  TBili  0.2  /  DBili  x   /  AST  11  /  ALT  6<L>  /  AlkPhos  77  07-24    PT/INR - ( 24 Jul 2019 06:18 )   PT: 15.8 sec;   INR: 1.37 ratio         PTT - ( 24 Jul 2019 06:18 )  PTT:35.1 sec

## 2019-07-25 NOTE — PROGRESS NOTE ADULT - SUBJECTIVE AND OBJECTIVE BOX
NEPHROLOGY-[facility] [phone]        Patient seen and examined in bed.  She was about the same        MEDICATIONS  (STANDING):  ALBUTerol/ipratropium for Nebulization 3 milliLiter(s) Nebulizer every 6 hours  apixaban 2.5 milliGRAM(s) Oral two times a day  artificial tears (preservative free) Ophthalmic Solution 1 Drop(s) Both EYES four times a day  ascorbic acid 500 milliGRAM(s) Oral daily  BACItracin   Ointment 1 Application(s) Topical two times a day  chlorhexidine 2% Cloths 1 Application(s) Topical daily  clonazePAM  Tablet 1 milliGRAM(s) Oral at bedtime  Dakins Solution - 1/4 Strength 1 Application(s) Topical two times a day  dextrose 5%. 1000 milliLiter(s) (50 mL/Hr) IV Continuous <Continuous>  famotidine    Tablet 20 milliGRAM(s) Oral daily  ferrous    sulfate Liquid 300 milliGRAM(s) Enteral Tube daily  formoterol for nebulization 20 MICROGram(s) Nebulizer two times a day  gabapentin   Solution 300 milliGRAM(s) Oral every 12 hours  hydrocortisone sodium succinate Injectable 25 milliGRAM(s) IV Push daily  insulin lispro (HumaLOG) corrective regimen sliding scale   SubCutaneous every 6 hours  levothyroxine Injectable 60 MICROGram(s) IV Push at bedtime  Medical Marijuana Awake Oil 2 milliLiter(s),Medical Marijuana Awake Oil 2 milliLiter(s) 2 milliLiter(s) Enteral Tube <User Schedule>  Medical Marijuana Calm Oil 2 milliLiter(s),Medical [place] Calm Oil 2 milliLiter(s) 2 milliLiter(s) Enteral Tube <User Schedule>  Medical Marijuana [place] Oil 2 milliLiter(s),Medical Marijuana [place] Oil 2 milliLiter(s) 2 milliLiter(s) Enteral Tube <User Schedule>  metoprolol tartrate 12.5 milliGRAM(s) Oral two times a day  multivitamin/minerals/iron Oral Solution (CENTRUM) 15 milliLiter(s) Oral daily  potassium chloride  10 mEq/100 mL IVPB 10 milliEquivalent(s) IV Intermittent every 1 hour  QUEtiapine 25 milliGRAM(s) Oral at bedtime  sodium chloride 0.9%. 1000 milliLiter(s) (75 mL/Hr) IV Continuous <Continuous>  tiZANidine 4 milliGRAM(s) Oral <User Schedule>  zinc sulfate 220 milliGRAM(s) Oral daily      VITAL:  T(C): , Max: 36.8 (07-24-19 @ 21:00)  T(F): , Max: 98.2 (07-24-19 @ 21:00)  HR: 105 (07-25-19 @ 09:02)  BP: 133/76 (07-25-19 @ 09:02)  BP(mean): --  RR: 18 (07-25-19 @ 09:02)  SpO2: 98% (07-25-19 @ 09:02)  Wt(kg): --    I and O's:    07-24 @ 07:01  -  07-25 @ 07:00  --------------------------------------------------------  IN: 1475 mL / OUT: 600 mL / NET: 875 mL          PHYSICAL EXAM:    Constitutional: NAD  Neck:  3 cm  JVD over clavicle at 60 degrees  Respiratory: poor effort   Cardiovascular: S1 and S2  Gastrointestinal: BS+, soft, NT/ND  Extremities: +  peripheral edema  Neurological:unable   : +  [place]  Skin: No rashes  Access: Not applicable    LABS:                        7.7    13.98 )-----------( 289      ( 25 Jul 2019 07:56 )             24.3     07-25    139  |  94<L>  |  127<H>  ----------------------------<  113<H>  3.4<L>   |  21<L>  |  3.04<H>    Ca    8.1<L>      25 Jul 2019 06:47  Phos  8.5     07-24  Mg     2.2     07-24    TPro  5.2<L>  /  Alb  2.2<L>  /  TBili  0.2  /  DBili  x   /  AST  11  /  ALT  6<L>  /  AlkPhos  77  07-24          Urine Studies:          RADIOLOGY & ADDITIONAL STUDIES:

## 2019-07-25 NOTE — PROGRESS NOTE ADULT - SUBJECTIVE AND OBJECTIVE BOX
---___---___---___---___---___---___ ---___---___---___---___---___---___---___---___---                  M E D I C A L   A T T E N D I N G   P R O G R E S S   N O T E  ---___---___---___---___---___---___ ---___---___---___---___---___---___---___---___---        ================================================    ++CHIEF COMPLAINT:   Patient is a 69y old  Female who presents with a chief complaint of PEG tube dislodgement, fever, leukocytosis (2019 22:15)      Gastrostomy malfunction    ---___---___---___---___---___---  PAST MEDICAL / Surgical  HISTORY:  PAST MEDICAL & SURGICAL HISTORY:  Type 2 diabetes mellitus  Dementia  DVT, lower extremity  CVA (cerebral vascular accident)  Fatty pancreas  PNA (pneumonia)  Pulmonary HTN  IGT (impaired glucose tolerance)  Ulcerative colitis  Acid reflux  Anxiety  Depression  Mouth sores  HLD (hyperlipidemia)  Asthma  S/P percutaneous endoscopic gastrostomy (PEG) tube placement: for dysphagia  Humeral head fracture  H/O: hysterectomy  H/O cataract extraction, left  History of knee replacement      ---___---___---___---___---___---  FAMILY HISTORY:   FAMILY HISTORY:  Family history of dementia (Grandparent)  Family history of colon cancer (Grandparent)        ---___---___---___---___---___---  ALLERGIES:   Allergies    ASA; dye contrast (Anaphylaxis)  aspirin (Short breath)  divalproex sodium (Other (Unknown))  Flowers and Plants (Short breath)  Haldol (Other (Unknown))  penicillin (Short breath; Rash)  sulfa drugs (Short breath; Rash)  vancomycin (Rash; Urticaria; Hives)  Xanax (Other (Unknown))    Intolerances        ---___---___---___---___---___---  MEDICATIONS:  MEDICATIONS  (STANDING):  ALBUTerol/ipratropium for Nebulization 3 milliLiter(s) Nebulizer every 6 hours  apixaban 2.5 milliGRAM(s) Oral two times a day  artificial tears (preservative free) Ophthalmic Solution 1 Drop(s) Both EYES four times a day  ascorbic acid 500 milliGRAM(s) Oral daily  BACItracin   Ointment 1 Application(s) Topical two times a day  chlorhexidine 2% Cloths 1 Application(s) Topical daily  clonazePAM  Tablet 1 milliGRAM(s) Oral at bedtime  Dakins Solution - 1/4 Strength 1 Application(s) Topical two times a day  dextrose 5%. 1000 milliLiter(s) (50 mL/Hr) IV Continuous <Continuous>  famotidine    Tablet 20 milliGRAM(s) Oral daily  ferrous    sulfate Liquid 300 milliGRAM(s) Enteral Tube daily  formoterol for nebulization 20 MICROGram(s) Nebulizer two times a day  gabapentin   Solution 300 milliGRAM(s) Oral every 12 hours  hydrocortisone sodium succinate Injectable 25 milliGRAM(s) IV Push daily  insulin lispro (HumaLOG) corrective regimen sliding scale   SubCutaneous every 6 hours  levothyroxine Injectable 60 MICROGram(s) IV Push at bedtime  Medical Marijuana Awake Oil 2 milliLiter(s),Medical Marijuana Awake Oil 2 milliLiter(s) 2 milliLiter(s) Enteral Tube <User Schedule>  Medical Marijuana Calm Oil 2 milliLiter(s),Medical [place]ju[place] Calm Oil 2 milliLiter(s) 2 milliLiter(s) Enteral Tube <User Schedule>  Medical Marijuana [place] Oil 2 milliLiter(s),Medical Marijuana [place] Oil 2 milliLiter(s) 2 milliLiter(s) Enteral Tube <User Schedule>  metoprolol tartrate 12.5 milliGRAM(s) Oral two times a day  multivitamin/minerals/iron Oral Solution (CENTRUM) 15 milliLiter(s) Oral daily  potassium chloride  10 mEq/100 mL IVPB 10 milliEquivalent(s) IV Intermittent every 1 hour  QUEtiapine 25 milliGRAM(s) Oral at bedtime  sodium chloride 0.9%. 1000 milliLiter(s) (75 mL/Hr) IV Continuous <Continuous>  tiZANidine 4 milliGRAM(s) Oral <User Schedule>  zinc sulfate 220 milliGRAM(s) Oral daily    MEDICATIONS  (PRN):  acetaminophen  Suppository .. 650 milliGRAM(s) Rectal every 6 hours PRN Temp greater or equal to 38C (100.4F), Mild Pain (1 - 3)  carboxymethylcellulose 0.5% (Preservative-Free) Ophthalmic Solution 1 Drop(s) Both EYES three times a day PRN dry eyes  glucagon  Injectable 1 milliGRAM(s) IntraMuscular once PRN Glucose LESS THAN 70 milligrams/deciliter  nystatin Powder 1 Application(s) Topical three times a day PRN under breasts      ---___---___---___---___---___---  REVIEW OF SYSTEM:    GEN: no fever, no chills, no pain  RESP: no SOB, no cough, no sputum  CVS: no chest pain, no palpitations, no edema  GI: no abdominal pain, no nausea, no vomiting, no constipation, no diarrhea  : no dysurea, no frequency, no hematurea  Neuro: no headache, no dizziness  PSYCH: no anxiety, no depression  Derm : no itching, no rash    ---___---___---___---___---___---  VITAL SIGNS:  69y , CAPILLARY BLOOD GLUCOSE      POCT Blood Glucose.: 136 mg/dL (2019 05:40)    T(C): 36.6 (19 @ 09:02), Max: 36.8 (19 @ 21:00)  HR: 105 (19 @ 09:02) (97 - 109)  BP: 133/76 (19 @ 09:02) (128/72 - 153/87)  RR: 18 (07-25-19 @ 09:02) (18 - 18)  SpO2: 98% (19 @ 09:02) (92% - 100%)  ---___---___---___---___---___---  PHYSICAL EXAM:    GEN: A&O X 3 , NAD , comfortable  HEENT: NCAT, PERRL, MMM, hearing intact  Neck: supple , no JVD  CVS: S1S2 , regular , No M/R/G appreciated  PULM: CTA B/L,  no W/R/R appreciated  ABD.: soft. non tender, non distended,  bowel sounds present  Extrem: intact pulses , no edema   Derm: No rash , no ecchymoses  PSYCH : normal mood,  no delusion not anxious     ---___---___---___---___---___---            LAB AND IMAGIN.7    13.98 )-----------( 289      ( 2019 07:56 )             24.3               07-25    139  |  94<L>  |  127<H>  ----------------------------<  113<H>  3.4<L>   |  21<L>  |  3.04<H>    Ca    8.1<L>      2019 06:47  Phos  8.5     07-  Mg     2.2     07-24    TPro  5.2<L>  /  Alb  2.2<L>  /  TBili  0.2  /  DBili  x   /  AST  11  /  ALT  6<L>  /  AlkPhos  77  07-24    PT/INR - ( 2019 06:18 )   PT: 15.8 sec;   INR: 1.37 ratio         PTT - ( 2019 06:18 )  PTT:35.1 sec                            [All pertinent / recent Imaging reviewed]         ---___---___---___---___---___---___ ---___---___---___---___---                         A S S E S S M E N T   A N D   P L A N :      HEALTH ISSUES - PROBLEM Dx:  libia  trending up  renal consideration   leukocytosis trending down  asthma continue nebulizer treatment  chornic pain for m fracture left leg  continue med s  agitation continue meds   -GI/DVT Prophylaxis.   dysphagia continue peg feeds  chf on medication   anemia follow cbc   overall prognosis poor    --------------------------------------------  Case discussed with   Education given on   ___________________________  Thank you,  Deniz Bolden  [phone] ---___---___---___---___---___---___ ---___---___---___---___---___---___---___---___---                  M E D I C A L   A T T E N D I N G   P R O G R E S S   N O T E  ---___---___---___---___---___---___ ---___---___---___---___---___---___---___---___---        ================================================    ++CHIEF COMPLAINT:   Patient is a 69y old  Female who presents with a chief complaint of PEG tube dislodgement, fever, leukocytosis (2019 22:15)      Gastrostomy malfunction    ---___---___---___---___---___---  PAST MEDICAL / Surgical  HISTORY:  PAST MEDICAL & SURGICAL HISTORY:  Type 2 diabetes mellitus  Dementia  DVT, lower extremity  CVA (cerebral vascular accident)  Fatty pancreas  PNA (pneumonia)  Pulmonary HTN  IGT (impaired glucose tolerance)  Ulcerative colitis  Acid reflux  Anxiety  Depression  Mouth sores  HLD (hyperlipidemia)  Asthma  S/P percutaneous endoscopic gastrostomy (PEG) tube placement: for dysphagia  Humeral head fracture  H/O: hysterectomy  H/O cataract extraction, left  History of knee replacement      ---___---___---___---___---___---  FAMILY HISTORY:   FAMILY HISTORY:  Family history of dementia (Grandparent)  Family history of colon cancer (Grandparent)        ---___---___---___---___---___---  ALLERGIES:   Allergies    ASA; dye contrast (Anaphylaxis)  aspirin (Short breath)  divalproex sodium (Other (Unknown))  Flowers and Plants (Short breath)  Haldol (Other (Unknown))  penicillin (Short breath; Rash)  sulfa drugs (Short breath; Rash)  vancomycin (Rash; Urticaria; Hives)  Xanax (Other (Unknown))    Intolerances        ---___---___---___---___---___---  MEDICATIONS:  MEDICATIONS  (STANDING):  ALBUTerol/ipratropium for Nebulization 3 milliLiter(s) Nebulizer every 6 hours  apixaban 2.5 milliGRAM(s) Oral two times a day  artificial tears (preservative free) Ophthalmic Solution 1 Drop(s) Both EYES four times a day  ascorbic acid 500 milliGRAM(s) Oral daily  BACItracin   Ointment 1 Application(s) Topical two times a day  chlorhexidine 2% Cloths 1 Application(s) Topical daily  clonazePAM  Tablet 1 milliGRAM(s) Oral at bedtime  Dakins Solution - 1/4 Strength 1 Application(s) Topical two times a day  dextrose 5%. 1000 milliLiter(s) (50 mL/Hr) IV Continuous <Continuous>  famotidine    Tablet 20 milliGRAM(s) Oral daily  ferrous    sulfate Liquid 300 milliGRAM(s) Enteral Tube daily  formoterol for nebulization 20 MICROGram(s) Nebulizer two times a day  gabapentin   Solution 300 milliGRAM(s) Oral every 12 hours  hydrocortisone sodium succinate Injectable 25 milliGRAM(s) IV Push daily  insulin lispro (HumaLOG) corrective regimen sliding scale   SubCutaneous every 6 hours  levothyroxine Injectable 60 MICROGram(s) IV Push at bedtime  Medical Marijuana Awake Oil 2 milliLiter(s),Medical Marijuana Awake Oil 2 milliLiter(s) 2 milliLiter(s) Enteral Tube <User Schedule>  Medical Marijuana Calm Oil 2 milliLiter(s),Medical Marinjuana Calm Oil 2 milliLiter(s) 2 milliLiter(s) Enteral Tube <User Schedule>  Medical Marijuana [place] Oil 2 milliLiter(s),Medical Marijuana [place] Oil 2 milliLiter(s) 2 milliLiter(s) Enteral Tube <User Schedule>  metoprolol tartrate 12.5 milliGRAM(s) Oral two times a day  multivitamin/minerals/iron Oral Solution (CENTRUM) 15 milliLiter(s) Oral daily  potassium chloride  10 mEq/100 mL IVPB 10 milliEquivalent(s) IV Intermittent every 1 hour  QUEtiapine 25 milliGRAM(s) Oral at bedtime  sodium chloride 0.9%. 1000 milliLiter(s) (75 mL/Hr) IV Continuous <Continuous>  tiZANidine 4 milliGRAM(s) Oral <User Schedule>  zinc sulfate 220 milliGRAM(s) Oral daily    MEDICATIONS  (PRN):  acetaminophen  Suppository .. 650 milliGRAM(s) Rectal every 6 hours PRN Temp greater or equal to 38C (100.4F), Mild Pain (1 - 3)  carboxymethylcellulose 0.5% (Preservative-Free) Ophthalmic Solution 1 Drop(s) Both EYES three times a day PRN dry eyes  glucagon  Injectable 1 milliGRAM(s) IntraMuscular once PRN Glucose LESS THAN 70 milligrams/deciliter  nystatin Powder 1 Application(s) Topical three times a day PRN under breasts      ---___---___---___---___---___---  REVIEW OF SYSTEM:    unable to obatain     ---___---___---___---___---___---  VITAL SIGNS:  69y , CAPILLARY BLOOD GLUCOSE      POCT Blood Glucose.: 136 mg/dL (2019 05:40)    T(C): 36.6 (19 @ 09:02), Max: 36.8 (19 @ 21:00)  HR: 105 (19 @ 09:02) (97 - 109)  BP: 133/76 (19 @ 09:02) (128/72 - 153/87)  RR: 18 (19 @ 09:02) (18 - 18)  SpO2: 98% (19 @ 09:02) (92% - 100%)  ---___---___---___---___---___---  PHYSICAL EXAM:    GEN: A&O X 0 , NAD , comfortable  HEENT: NCAT, PERRL, MMM, hearing intact  Neck: supple , no JVD  CVS: S1S2 , regular , No M/R/G appreciated  PULM: CTA B/L,  no W/R/R appreciated  ABD.: soft. non tender, non distended,  bowel sounds present peg noted   Extrem: intact pulses , no edema  contractured left leg and fractured   Derm: No rash , no ecchymoses  PSYCH : normal mood,  no delusion not anxious     ---___---___---___---___---___---            LAB AND IMAGIN.7    13.98 )-----------( 289      ( 2019 07:56 )             24.3               07-    139  |  94<L>  |  127<H>  ----------------------------<  113<H>  3.4<L>   |  21<L>  |  3.04<H>    Ca    8.1<L>      2019 06:47  Phos  8.5     07-  Mg     2.2     07-24    TPro  5.2<L>  /  Alb  2.2<L>  /  TBili  0.2  /  DBili  x   /  AST  11  /  ALT  6<L>  /  AlkPhos  77  07-24    PT/INR - ( 2019 06:18 )   PT: 15.8 sec;   INR: 1.37 ratio         PTT - ( 2019 06:18 )  PTT:35.1 sec                            [All pertinent / recent Imaging reviewed]         ---___---___---___---___---___---___ ---___---___---___---___---                         A S S E S S M E N T   A N D   P L A N :      HEALTH ISSUES - PROBLEM Dx:  libia  trending up  renal consideration   leukocytosis trending down  asthma continue nebulizer treatment  chornic pain for m fracture left leg  continue med s  agitation continue meds   -GI/DVT Prophylaxis.   dysphagia continue peg feeds  chf on medication   anemia follow cbc   overall prognosis poor    --------------------------------------------  Case discussed with   Education given on   ___________________________  Thank you,  Deniz Schraderad  [phone]

## 2019-07-26 LAB
ANION GAP SERPL CALC-SCNC: 22 MMOL/L — HIGH (ref 5–17)
BLD GP AB SCN SERPL QL: NEGATIVE — SIGNIFICANT CHANGE UP
BUN SERPL-MCNC: 123 MG/DL — HIGH (ref 7–23)
CALCIUM SERPL-MCNC: 8.1 MG/DL — LOW (ref 8.4–10.5)
CHLORIDE SERPL-SCNC: 95 MMOL/L — LOW (ref 96–108)
CO2 SERPL-SCNC: 20 MMOL/L — LOW (ref 22–31)
CREAT SERPL-MCNC: 3.27 MG/DL — HIGH (ref 0.5–1.3)
GLUCOSE BLDC GLUCOMTR-MCNC: 123 MG/DL — HIGH (ref 70–99)
GLUCOSE BLDC GLUCOMTR-MCNC: 130 MG/DL — HIGH (ref 70–99)
GLUCOSE BLDC GLUCOMTR-MCNC: 216 MG/DL — HIGH (ref 70–99)
GLUCOSE BLDC GLUCOMTR-MCNC: 264 MG/DL — HIGH (ref 70–99)
GLUCOSE SERPL-MCNC: 108 MG/DL — HIGH (ref 70–99)
HCT VFR BLD CALC: 22.7 % — LOW (ref 34.5–45)
HGB BLD-MCNC: 7.3 G/DL — LOW (ref 11.5–15.5)
MAGNESIUM SERPL-MCNC: 2.1 MG/DL — SIGNIFICANT CHANGE UP (ref 1.6–2.6)
MCHC RBC-ENTMCNC: 29.1 PG — SIGNIFICANT CHANGE UP (ref 27–34)
MCHC RBC-ENTMCNC: 32.2 GM/DL — SIGNIFICANT CHANGE UP (ref 32–36)
MCV RBC AUTO: 90.4 FL — SIGNIFICANT CHANGE UP (ref 80–100)
PLATELET # BLD AUTO: 280 K/UL — SIGNIFICANT CHANGE UP (ref 150–400)
POTASSIUM SERPL-MCNC: 3.9 MMOL/L — SIGNIFICANT CHANGE UP (ref 3.5–5.3)
POTASSIUM SERPL-SCNC: 3.9 MMOL/L — SIGNIFICANT CHANGE UP (ref 3.5–5.3)
RBC # BLD: 2.51 M/UL — LOW (ref 3.8–5.2)
RBC # FLD: 15.4 % — HIGH (ref 10.3–14.5)
RH IG SCN BLD-IMP: NEGATIVE — SIGNIFICANT CHANGE UP
SODIUM SERPL-SCNC: 137 MMOL/L — SIGNIFICANT CHANGE UP (ref 135–145)
WBC # BLD: 13.29 K/UL — HIGH (ref 3.8–10.5)
WBC # FLD AUTO: 13.29 K/UL — HIGH (ref 3.8–10.5)

## 2019-07-26 PROCEDURE — 99232 SBSQ HOSP IP/OBS MODERATE 35: CPT

## 2019-07-26 PROCEDURE — 99233 SBSQ HOSP IP/OBS HIGH 50: CPT

## 2019-07-26 RX ORDER — FUROSEMIDE 40 MG
40 TABLET ORAL ONCE
Refills: 0 | Status: COMPLETED | OUTPATIENT
Start: 2019-07-26 | End: 2019-07-26

## 2019-07-26 RX ADMIN — SEVELAMER CARBONATE 800 MILLIGRAM(S): 2400 POWDER, FOR SUSPENSION ORAL at 08:55

## 2019-07-26 RX ADMIN — Medication 12.5 MILLIGRAM(S): at 17:10

## 2019-07-26 RX ADMIN — Medication 3 MILLILITER(S): at 00:39

## 2019-07-26 RX ADMIN — Medication 60 MICROGRAM(S): at 22:28

## 2019-07-26 RX ADMIN — Medication 1 MILLIGRAM(S): at 22:27

## 2019-07-26 RX ADMIN — CHLORHEXIDINE GLUCONATE 1 APPLICATION(S): 213 SOLUTION TOPICAL at 12:54

## 2019-07-26 RX ADMIN — FAMOTIDINE 20 MILLIGRAM(S): 10 INJECTION INTRAVENOUS at 12:53

## 2019-07-26 RX ADMIN — Medication 1 APPLICATION(S): at 17:09

## 2019-07-26 RX ADMIN — GABAPENTIN 300 MILLIGRAM(S): 400 CAPSULE ORAL at 21:01

## 2019-07-26 RX ADMIN — TIZANIDINE 4 MILLIGRAM(S): 4 TABLET ORAL at 08:55

## 2019-07-26 RX ADMIN — Medication 500 MILLIGRAM(S): at 12:54

## 2019-07-26 RX ADMIN — Medication 20 MICROGRAM(S): at 05:22

## 2019-07-26 RX ADMIN — Medication 300 MILLIGRAM(S): at 12:53

## 2019-07-26 RX ADMIN — Medication 3 MILLILITER(S): at 12:51

## 2019-07-26 RX ADMIN — Medication 3 MILLILITER(S): at 17:09

## 2019-07-26 RX ADMIN — TIZANIDINE 4 MILLIGRAM(S): 4 TABLET ORAL at 21:01

## 2019-07-26 RX ADMIN — Medication 15 MILLILITER(S): at 12:53

## 2019-07-26 RX ADMIN — APIXABAN 2.5 MILLIGRAM(S): 2.5 TABLET, FILM COATED ORAL at 05:21

## 2019-07-26 RX ADMIN — APIXABAN 2.5 MILLIGRAM(S): 2.5 TABLET, FILM COATED ORAL at 17:09

## 2019-07-26 RX ADMIN — Medication 2: at 18:43

## 2019-07-26 RX ADMIN — Medication 12.5 MILLIGRAM(S): at 05:21

## 2019-07-26 RX ADMIN — Medication 1 DROP(S): at 05:23

## 2019-07-26 RX ADMIN — Medication 50 MILLIGRAM(S): at 05:22

## 2019-07-26 RX ADMIN — Medication 1 DROP(S): at 17:08

## 2019-07-26 RX ADMIN — Medication 1 APPLICATION(S): at 05:22

## 2019-07-26 RX ADMIN — Medication 40 MILLIGRAM(S): at 17:07

## 2019-07-26 RX ADMIN — Medication 1 APPLICATION(S): at 05:23

## 2019-07-26 RX ADMIN — SEVELAMER CARBONATE 800 MILLIGRAM(S): 2400 POWDER, FOR SUSPENSION ORAL at 12:54

## 2019-07-26 RX ADMIN — ZINC SULFATE TAB 220 MG (50 MG ZINC EQUIVALENT) 220 MILLIGRAM(S): 220 (50 ZN) TAB at 12:53

## 2019-07-26 RX ADMIN — GABAPENTIN 300 MILLIGRAM(S): 400 CAPSULE ORAL at 08:55

## 2019-07-26 RX ADMIN — Medication 3 MILLILITER(S): at 05:33

## 2019-07-26 RX ADMIN — Medication 20 MICROGRAM(S): at 18:43

## 2019-07-26 RX ADMIN — SEVELAMER CARBONATE 800 MILLIGRAM(S): 2400 POWDER, FOR SUSPENSION ORAL at 17:08

## 2019-07-26 RX ADMIN — QUETIAPINE FUMARATE 25 MILLIGRAM(S): 200 TABLET, FILM COATED ORAL at 22:28

## 2019-07-26 RX ADMIN — Medication 1 DROP(S): at 12:53

## 2019-07-26 RX ADMIN — Medication 3: at 12:55

## 2019-07-26 NOTE — PROGRESS NOTE ADULT - ASSESSMENT
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 since admission, went to IR to have PEG replaced.  TRANG     1. TRANG likely multifactorial with Creatinine slightly up which is concerning but urination is still >.5cc/kg (730cc/24 hours)  2.  Anemia; Hgb low;         RECOMMEND:  -  Leave on Nepro feeds   - 1 unit of PRBC in 1/2 units and then IV Lasix 40mg ivp x 1  p the full unit of blood   - Maintain gavin catheter(chronic gavin)   - Strict I & Os  - I am not pleased as to the uptick in the creatinine.  The patient herself has very little reserve.  Long term HD is not an option at all.  Not sure she will survive even a short term run.  Again  sis not want previously and I do not want to push the issue at all  -revenla powder tid

## 2019-07-26 NOTE — PROGRESS NOTE ADULT - SUBJECTIVE AND OBJECTIVE BOX
---___---___---___---___---___---___ ---___---___---___---___---___---___---___---___---                  M E D I C A L   A T T E N D I N G   P R O G R E S S   N O T E  ---___---___---___---___---___---___ ---___---___---___---___---___---___---___---___---        ================================================    ++CHIEF COMPLAINT:   Patient is a 69y old  Female who presents with a chief complaint of PEG tube dislodgement, fever, leukocytosis (2019 14:31)      libia noted still  trending higher again     ---___---___---___---___---___---  PAST MEDICAL / Surgical  HISTORY:  PAST MEDICAL & SURGICAL HISTORY:  Type 2 diabetes mellitus  Dementia  DVT, lower extremity  CVA (cerebral vascular accident)  Fatty pancreas  PNA (pneumonia)  Pulmonary HTN  IGT (impaired glucose tolerance)  Ulcerative colitis  Acid reflux  Anxiety  Depression  Mouth sores  HLD (hyperlipidemia)  Asthma  S/P percutaneous endoscopic gastrostomy (PEG) tube placement: for dysphagia  Humeral head fracture  H/O: hysterectomy  H/O cataract extraction, left  History of knee replacement      ---___---___---___---___---___---  FAMILY HISTORY:   FAMILY HISTORY:  Family history of dementia (Grandparent)  Family history of colon cancer (Grandparent)        ---___---___---___---___---___---  ALLERGIES:   Allergies    ASA; dye contrast (Anaphylaxis)  aspirin (Short breath)  divalproex sodium (Other (Unknown))  Flowers and Plants (Short breath)  Haldol (Other (Unknown))  penicillin (Short breath; Rash)  sulfa drugs (Short breath; Rash)  vancomycin (Rash; Urticaria; Hives)  Xanax (Other (Unknown))    Intolerances        ---___---___---___---___---___---  MEDICATIONS:  MEDICATIONS  (STANDING):  ALBUTerol/ipratropium for Nebulization 3 milliLiter(s) Nebulizer every 6 hours  apixaban 2.5 milliGRAM(s) Oral two times a day  artificial tears (preservative free) Ophthalmic Solution 1 Drop(s) Both EYES four times a day  ascorbic acid 500 milliGRAM(s) Oral daily  BACItracin   Ointment 1 Application(s) Topical two times a day  chlorhexidine 2% Cloths 1 Application(s) Topical daily  clonazePAM  Tablet 1 milliGRAM(s) Oral at bedtime  Dakins Solution - 1/4 Strength 1 Application(s) Topical two times a day  dextrose 5%. 1000 milliLiter(s) (50 mL/Hr) IV Continuous <Continuous>  famotidine    Tablet 20 milliGRAM(s) Oral daily  ferrous    sulfate Liquid 300 milliGRAM(s) Enteral Tube daily  formoterol for nebulization 20 MICROGram(s) Nebulizer two times a day  furosemide   Injectable 40 milliGRAM(s) IV Push once  gabapentin   Solution 300 milliGRAM(s) Oral every 12 hours  insulin lispro (HumaLOG) corrective regimen sliding scale   SubCutaneous every 6 hours  levothyroxine Injectable 60 MICROGram(s) IV Push at bedtime  Medical Marijuana Awake Oil 2 milliLiter(s),Medical Marijuana Awake Oil 2 milliLiter(s) 2 milliLiter(s) Enteral Tube <User Schedule>  Medical Marijuana Calm Oil 2 milliLiter(s),Medical Marinjuana Calm Oil 2 milliLiter(s) 2 milliLiter(s) Enteral Tube <User Schedule>  Medical Marijuana [place] Oil 2 milliLiter(s),Medical Marijuana [place] Oil 2 milliLiter(s) 2 milliLiter(s) Enteral Tube <User Schedule>  metoprolol tartrate 12.5 milliGRAM(s) Oral two times a day  multivitamin/minerals/iron Oral Solution (CENTRUM) 15 milliLiter(s) Oral daily  predniSONE   Tablet 50 milliGRAM(s) Oral daily  QUEtiapine 25 milliGRAM(s) Oral at bedtime  sevelamer carbonate Powder 800 milliGRAM(s) Oral three times a day with meals  sodium chloride 0.9%. 1000 milliLiter(s) (75 mL/Hr) IV Continuous <Continuous>  tiZANidine 4 milliGRAM(s) Oral <User Schedule>  zinc sulfate 220 milliGRAM(s) Oral daily    MEDICATIONS  (PRN):  acetaminophen  Suppository .. 650 milliGRAM(s) Rectal every 6 hours PRN Temp greater or equal to 38C (100.4F), Mild Pain (1 - 3)  carboxymethylcellulose 0.5% (Preservative-Free) Ophthalmic Solution 1 Drop(s) Both EYES three times a day PRN dry eyes  glucagon  Injectable 1 milliGRAM(s) IntraMuscular once PRN Glucose LESS THAN 70 milligrams/deciliter  nystatin Powder 1 Application(s) Topical three times a day PRN under breasts      ---___---___---___---___---___---  REVIEW OF SYSTEM:    unable to obtain     ---___---___---___---___---___---  VITAL SIGNS:  69y , CAPILLARY BLOOD GLUCOSE      POCT Blood Glucose.: 264 mg/dL (2019 12:21)    T(C): 36.8 (19 @ 13:00), Max: 36.8 (19 @ 21:00)  HR: 107 (19 @ 13:00) (100 - 110)  BP: 144/84 (19 @ 13:00) (121/71 - 154/80)  RR: 18 (19 @ 13:00) (18 - 18)  SpO2: 100% (19 @ 13:00) (96% - 100%)  ---___---___---___---___---___---  PHYSICAL EXAM:    GEN: A&O X 02 , NAD , comfortable  HEENT: NCAT, PERRL, MMM, hearing intact  Neck: supple , no JVD  CVS: S1S2 , regular , No M/R/G appreciated  PULM: CTA B/L,  no W/R/R appreciated  ABD.: soft. non tender, non distended,  bowel sounds present  peg noted   Extrem: intact pulses , no edema multiple pressure ulcers noted to the legs and a arge stage 4 sacral decubitus noted on the back  Derm: multiple decubiti noted      ---___---___---___---___---___---            LAB AND IMAGIN.3    13.29 )-----------( 280      ( 2019 08:33 )             22.7               07-    137  |  95<L>  |  123<H>  ----------------------------<  108<H>  3.9   |  20<L>  |  3.27<H>    Ca    8.1<L>      2019 05:49  Mg     2.1                                       [All pertinent / recent Imaging reviewed]         ---___---___---___---___---___---___ ---___---___---___---___---                         A S S E S S M E N T   A N D   P L A N :      HEALTH ISSUES - PROBLEM Dx:    libia renal to adjust and to monitor leveles  multiple decubiti wound care following   chrinic pain from  fractured leg  continue pain meds  anemia finished transfusion   agitation  on klonopin and seroquel   dm2 continueridd and fingersticks  asthma on nebulizer   h/o cva supportive care  dementia supportive care   muscle spasms and contractures on xanaflex  and neurontin  hypothyroid on levothyroxin       overall prognosis poor                -GI/DVT Prophylaxis. continue eliquis     --------------------------------------------  Case discussed with   Education given on   ___________________________  Thank Deniz payne  [phone]

## 2019-07-26 NOTE — PROGRESS NOTE ADULT - ASSESSMENT
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 Since admission, went to IR to have PEG replaced (6/25/19)    s/p RRT on 7/13 for hypotension in the SBP 50-60s and hypoxia in the 80s, admitted to MICU for shock requiring IV pressor support. Now stable off pressors, on NC. On stress steroids dosing  DVT on AC  s/p RRT for hypotension 7/21- 7/22 overnight  Anemia - without overt/brisk GI bleed.     RECS:  -keep G tube bumper at ~2-3 cm richard on G tube.  -G tube feeds as ordered.   -Renal input noted, continue on Nepro for now  -wound care  -ID following  -monitor BMs  -Renal following  -Continue Pepcid  -Monitor BMs  -epogen as per Renal  -steroids per primary team    Please call over weekend prn with GI concerns   GI service : [phone]    Adam Miller PA-C    [facility]  [phone]  After hours and weekend coverage [phone]

## 2019-07-26 NOTE — PROGRESS NOTE ADULT - SUBJECTIVE AND OBJECTIVE BOX
Patient is a 69y old  Female who presented with a chief complaint of PEG tube dislodgement, fever, leukocytosis (26 Jul 2019 10:25)      INTERVAL HPI/OVERNIGHT EVENTS:  tolerating PEG feeds - nepro at 25 cc/hour  no leakage form g tube site  no diarrhea    MEDICATIONS  (STANDING):  ALBUTerol/ipratropium for Nebulization 3 milliLiter(s) Nebulizer every 6 hours  apixaban 2.5 milliGRAM(s) Oral two times a day  artificial tears (preservative free) Ophthalmic Solution 1 Drop(s) Both EYES four times a day  ascorbic acid 500 milliGRAM(s) Oral daily  BACItracin   Ointment 1 Application(s) Topical two times a day  chlorhexidine 2% Cloths 1 Application(s) Topical daily  clonazePAM  Tablet 1 milliGRAM(s) Oral at bedtime  Dakins Solution - 1/4 Strength 1 Application(s) Topical two times a day  dextrose 5%. 1000 milliLiter(s) (50 mL/Hr) IV Continuous <Continuous>  famotidine    Tablet 20 milliGRAM(s) Oral daily  ferrous    sulfate Liquid 300 milliGRAM(s) Enteral Tube daily  formoterol for nebulization 20 MICROGram(s) Nebulizer two times a day  furosemide   Injectable 40 milliGRAM(s) IV Push once  gabapentin   Solution 300 milliGRAM(s) Oral every 12 hours  insulin lispro (HumaLOG) corrective regimen sliding scale   SubCutaneous every 6 hours  levothyroxine Injectable 60 MICROGram(s) IV Push at bedtime  Medical Marijuana Awake Oil 2 milliLiter(s),Medical Marijuana Awake Oil 2 milliLiter(s) 2 milliLiter(s) Enteral Tube <User Schedule>  Medical Marijuana Calm Oil 2 milliLiter(s),Medical Marinjuana Calm Oil 2 milliLiter(s) 2 milliLiter(s) Enteral Tube <User Schedule>  Medical Marijuana [place] Oil 2 milliLiter(s),Medical Marijuana [place] Oil 2 milliLiter(s) 2 milliLiter(s) Enteral Tube <User Schedule>  metoprolol tartrate 12.5 milliGRAM(s) Oral two times a day  multivitamin/minerals/iron Oral Solution (CENTRUM) 15 milliLiter(s) Oral daily  predniSONE   Tablet 50 milliGRAM(s) Oral daily  QUEtiapine 25 milliGRAM(s) Oral at bedtime  sevelamer carbonate Powder 800 milliGRAM(s) Oral three times a day with meals  sodium chloride 0.9%. 1000 milliLiter(s) (75 mL/Hr) IV Continuous <Continuous>  tiZANidine 4 milliGRAM(s) Oral <User Schedule>  zinc sulfate 220 milliGRAM(s) Oral daily    MEDICATIONS  (PRN):  acetaminophen  Suppository .. 650 milliGRAM(s) Rectal every 6 hours PRN Temp greater or equal to 38C (100.4F), Mild Pain (1 - 3)  carboxymethylcellulose 0.5% (Preservative-Free) Ophthalmic Solution 1 Drop(s) Both EYES three times a day PRN dry eyes  glucagon  Injectable 1 milliGRAM(s) IntraMuscular once PRN Glucose LESS THAN 70 milligrams/deciliter  nystatin Powder 1 Application(s) Topical three times a day PRN under breasts      Allergies  ASA; dye contrast (Anaphylaxis)  aspirin (Short breath)  divalproex sodium (Other (Unknown))  Flowers and Plants (Short breath)  Haldol (Other (Unknown))  penicillin (Short breath; Rash)  sulfa drugs (Short breath; Rash)  vancomycin (Rash; Urticaria; Hives)  Xanax (Other (Unknown))        Review of Systems:  unable to obtain    Vital Signs Last 24 Hrs  T(C): 36.7 (26 Jul 2019 09:00), Max: 36.8 (25 Jul 2019 21:00)  T(F): 98.1 (26 Jul 2019 09:00), Max: 98.3 (25 Jul 2019 21:00)  HR: 100 (26 Jul 2019 09:00) (100 - 110)  BP: 123/68 (26 Jul 2019 09:00) (121/71 - 154/80)  BP(mean): --  RR: 18 (26 Jul 2019 09:00) (18 - 18)  SpO2: 96% (26 Jul 2019 09:00) (96% - 100%)    PHYSICAL EXAM:  Constitutional: frail elderly chronically ill appearing +severe contractures, non verbal  eyes open spouse at bedside +anasarca and muscle wasting  Neck: No JVD  Respiratory: + rhonchi , decreased BS bases  Cardiovascular: S1 and S2 regular  Gastrointestinal: BS+, soft, +G tube  high in epigastric area  bumper noted to be at 2 cm richard. 1 piece of gauze under bumper. no leakage, site clean  Extremities: anasarca/+edema and muscle wasting +dressings in place ++contractures  Vascular: 2+ peripheral pulses  Neurological: lethargic, no focal asymmetry  Psychiatric: non verbal   Skin: anicteric  +skin dressings and heel pad cushions in place  multiple decubiti (wound care following)    LABS:                        7.3    13.29 )-----------( 280      ( 26 Jul 2019 08:33 )             22.7     07-26    137  |  95<L>  |  123<H>  ----------------------------<  108<H>  3.9   |  20<L>  |  3.27<H>    Ca    8.1<L>      26 Jul 2019 05:49  Mg     2.1     07-26           RADIOLOGY & ADDITIONAL TESTS:

## 2019-07-26 NOTE — PROGRESS NOTE ADULT - ASSESSMENT
Patient is 69 year-old woman with advanced dementia and functional quadriplegia presents with fevers, leukocytosis in the setting of PEG dislodgement. Patient with acute kidney injury that is likely multifactorial.   Patient still appears total volume overloaded with +JVD and diffuse edema/anasarca in the setting of worsening renal function. Suspect some of peripheral edema is due to lack of movement.  Transfuse and diurese.    Antibiotics per ID.  PEG management and feeds per GI/nutrition.    Appreciate nephrology input: currently off Lasix gtt.    Trend daily labs. Avoid nephrotoxic agents.

## 2019-07-26 NOTE — PROGRESS NOTE ADULT - ASSESSMENT
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  Fever and leukocytosis now. Abnormal CT chest.    , patient found with hypoxia and AMS suspect from aspiration event.            aspiration/pna        unresponsive today  reculture for signs  of infection        completed meropenem   at high risk of reaspiration  and decub related sepsis . last rrp was not likely due to sepsis  continues to do poorly but renal function stable  reculture prn  discussed with     call ID for any acute change over weekend

## 2019-07-26 NOTE — PROGRESS NOTE ADULT - SUBJECTIVE AND OBJECTIVE BOX
NEPHROLOGY-[facility] [phone]        Patient seen and examined in bed.  She was about the same         MEDICATIONS  (STANDING):  ALBUTerol/ipratropium for Nebulization 3 milliLiter(s) Nebulizer every 6 hours  apixaban 2.5 milliGRAM(s) Oral two times a day  artificial tears (preservative free) Ophthalmic Solution 1 Drop(s) Both EYES four times a day  ascorbic acid 500 milliGRAM(s) Oral daily  BACItracin   Ointment 1 Application(s) Topical two times a day  chlorhexidine 2% Cloths 1 Application(s) Topical daily  clonazePAM  Tablet 1 milliGRAM(s) Oral at bedtime  Dakins Solution - 1/4 Strength 1 Application(s) Topical two times a day  dextrose 5%. 1000 milliLiter(s) (50 mL/Hr) IV Continuous <Continuous>  famotidine    Tablet 20 milliGRAM(s) Oral daily  ferrous    sulfate Liquid 300 milliGRAM(s) Enteral Tube daily  formoterol for nebulization 20 MICROGram(s) Nebulizer two times a day  furosemide   Injectable 40 milliGRAM(s) IV Push once  gabapentin   Solution 300 milliGRAM(s) Oral every 12 hours  insulin lispro (HumaLOG) corrective regimen sliding scale   SubCutaneous every 6 hours  levothyroxine Injectable 60 MICROGram(s) IV Push at bedtime  Medical Marijuana Awake Oil 2 milliLiter(s),Medical Marijuana Awake Oil 2 milliLiter(s) 2 milliLiter(s) Enteral Tube <User Schedule>  Medical Marijuana Calm Oil 2 milliLiter(s),Medical Marinjuana Calm Oil 2 milliLiter(s) 2 milliLiter(s) Enteral Tube <User Schedule>  Medical Marijuana [place] Oil 2 milliLiter(s),Medical Marijuana [place] Oil 2 milliLiter(s) 2 milliLiter(s) Enteral Tube <User Schedule>  metoprolol tartrate 12.5 milliGRAM(s) Oral two times a day  multivitamin/minerals/iron Oral Solution (CENTRUM) 15 milliLiter(s) Oral daily  predniSONE   Tablet 50 milliGRAM(s) Oral daily  QUEtiapine 25 milliGRAM(s) Oral at bedtime  sevelamer carbonate Powder 800 milliGRAM(s) Oral three times a day with meals  sodium chloride 0.9%. 1000 milliLiter(s) (75 mL/Hr) IV Continuous <Continuous>  tiZANidine 4 milliGRAM(s) Oral <User Schedule>  zinc sulfate 220 milliGRAM(s) Oral daily      VITAL:  T(C): , Max: 36.8 (07-25-19 @ 21:00)  T(F): , Max: 98.3 (07-25-19 @ 21:00)  HR: 100 (07-26-19 @ 09:00)  BP: 123/68 (07-26-19 @ 09:00)  BP(mean): --  RR: 18 (07-26-19 @ 09:00)  SpO2: 96% (07-26-19 @ 09:00)  Wt(kg): --    I and O's:    07-25 @ 07:01  -  07-26 @ 07:00  --------------------------------------------------------  IN: 900 mL / OUT: 735 mL / NET: 165 mL          PHYSICAL EXAM:    Constitutional: NAD  Neck:  +  JVD  Respiratory: poor effort   Cardiovascular: S1 and S2  Gastrointestinal: BS+, soft, NT/ND  Extremities: +  peripheral edema  Neurological: unable   Psychiatric: unable   :+  [place]  Skin: No rashes  Access: Not applicable    LABS:                        7.3    13.29 )-----------( 280      ( 26 Jul 2019 08:33 )             22.7     07-26    137  |  95<L>  |  123<H>  ----------------------------<  108<H>  3.9   |  20<L>  |  3.27<H>    Ca    8.1<L>      26 Jul 2019 05:49  Mg     2.1     07-26            Urine Studies:          RADIOLOGY & ADDITIONAL STUDIES:

## 2019-07-26 NOTE — PROGRESS NOTE ADULT - SUBJECTIVE AND OBJECTIVE BOX
PULMONARY PROGRESS NOTE    MYRIAM HEATH  MRN-[id number]    Patient is a 69y old  Female who presents with a chief complaint of PEG tube dislodgement, fever, leukocytosis (26 Jul 2019 11:43)      HPI:       ROS:   -    ACTIVE MEDICATION LIST:  MEDICATIONS  (STANDING):  ALBUTerol/ipratropium for Nebulization 3 milliLiter(s) Nebulizer every 6 hours  apixaban 2.5 milliGRAM(s) Oral two times a day  artificial tears (preservative free) Ophthalmic Solution 1 Drop(s) Both EYES four times a day  ascorbic acid 500 milliGRAM(s) Oral daily  BACItracin   Ointment 1 Application(s) Topical two times a day  chlorhexidine 2% Cloths 1 Application(s) Topical daily  clonazePAM  Tablet 1 milliGRAM(s) Oral at bedtime  Dakins Solution - 1/4 Strength 1 Application(s) Topical two times a day  dextrose 5%. 1000 milliLiter(s) (50 mL/Hr) IV Continuous <Continuous>  famotidine    Tablet 20 milliGRAM(s) Oral daily  ferrous    sulfate Liquid 300 milliGRAM(s) Enteral Tube daily  formoterol for nebulization 20 MICROGram(s) Nebulizer two times a day  furosemide   Injectable 40 milliGRAM(s) IV Push once  gabapentin   Solution 300 milliGRAM(s) Oral every 12 hours  insulin lispro (HumaLOG) corrective regimen sliding scale   SubCutaneous every 6 hours  levothyroxine Injectable 60 MICROGram(s) IV Push at bedtime  Medical Marijuana Awake Oil 2 milliLiter(s),Medical Marijuana Awake Oil 2 milliLiter(s) 2 milliLiter(s) Enteral Tube <User Schedule>  Medical Marijuana Calm Oil 2 milliLiter(s),Medical Marinjuana Calm Oil 2 milliLiter(s) 2 milliLiter(s) Enteral Tube <User Schedule>  Medical Marijuana [place] Oil 2 milliLiter(s),Medical Marijuana [place] Oil 2 milliLiter(s) 2 milliLiter(s) Enteral Tube <User Schedule>  metoprolol tartrate 12.5 milliGRAM(s) Oral two times a day  multivitamin/minerals/iron Oral Solution (CENTRUM) 15 milliLiter(s) Oral daily  predniSONE   Tablet 50 milliGRAM(s) Oral daily  QUEtiapine 25 milliGRAM(s) Oral at bedtime  sevelamer carbonate Powder 800 milliGRAM(s) Oral three times a day with meals  sodium chloride 0.9%. 1000 milliLiter(s) (75 mL/Hr) IV Continuous <Continuous>  tiZANidine 4 milliGRAM(s) Oral <User Schedule>  zinc sulfate 220 milliGRAM(s) Oral daily    MEDICATIONS  (PRN):  acetaminophen  Suppository .. 650 milliGRAM(s) Rectal every 6 hours PRN Temp greater or equal to 38C (100.4F), Mild Pain (1 - 3)  carboxymethylcellulose 0.5% (Preservative-Free) Ophthalmic Solution 1 Drop(s) Both EYES three times a day PRN dry eyes  glucagon  Injectable 1 milliGRAM(s) IntraMuscular once PRN Glucose LESS THAN 70 milligrams/deciliter  nystatin Powder 1 Application(s) Topical three times a day PRN under breasts      EXAM:  Vital Signs Last 24 Hrs  T(C): 36.8 (26 Jul 2019 13:00), Max: 36.8 (25 Jul 2019 21:00)  T(F): 98.2 (26 Jul 2019 13:00), Max: 98.3 (25 Jul 2019 21:00)  HR: 107 (26 Jul 2019 13:00) (100 - 110)  BP: 144/84 (26 Jul 2019 13:00) (121/71 - 154/80)  BP(mean): --  RR: 18 (26 Jul 2019 13:00) (18 - 18)  SpO2: 100% (26 Jul 2019 13:00) (96% - 100%)    GENERAL: The patient is awake  in no apparent distress.     LUNGS: Clear to auscultation without wheezing, rales or rhonchi anterior fields; respirations unlabored    HEART: Regular rate and rhythm without murmur.                            7.3    13.29 )-----------( 280      ( 26 Jul 2019 08:33 )             22.7       07-26    137  |  95<L>  |  123<H>  ----------------------------<  108<H>  3.9   |  20<L>  |  3.27<H>    Ca    8.1<L>      26 Jul 2019 05:49  Mg     2.1     07-26          PROBLEM LIST:  69y Female with HEALTH ISSUES - PROBLEM Dx:  Fever: Fever  Type 2 diabetes mellitus without complication, without long-term current use of insulin:   Make sure you get your HgA1c checked every three months.  If you take oral diabetes medications, check your blood glucose two times a day.  If you take insulin, check your blood glucose before meals and at bedtime.  It&#x27;s important not to skip any meals.  Keep a log of your blood glucose results and always take it with you to your doctor appointments.  Keep a list of your current medications including injectables and over the counter medications and bring this medication list with you to all your doctor appointments.  If you have not seen your ophthalmologist this year call for appointment.  Check your feet daily for redness, sores, or openings. Do not self treat. If no improvement in two days call your primary care physician for an appointment.  Low blood sugar (hypoglycemia) is a blood sugar below 70mg/dl. Check your blood sugar if you feel signs/symptoms of hypoglycemia. If your blood sugar is below 70 take 15 grams of carbohydrates (ex 4 oz of apple juice, 3-4 glucose tablets, or 4-6 oz of regular soda) wait 15 minutes and repeat blood sugar to make sure it comes up above 70.  If your blood sugar is above 70 and you are due for a meal, have a meal.  If you are not due for a meal have a snack.  This snack helps keeps your blood sugar at a safe range.    Pressure injury of skin of sacral region, unspecified injury stage: Continue with wound care as instructed.   Maintain adequate nutrition, frequent repositioning , offloading with Zfloat boots.  Follow-up with your primary care physician and Wound Care Specialist within 1 week. Call for appointment.    Chronic deep vein thrombosis (DVT) of right lower extremity, unspecified vein: Take your &quot;blood thinners&quot; as prescribed.  Walking is encouraged, increase activity as tolerated.  If you develop new leg pain, swelling, and/or redness contact your healthcare provider.  If you develop new chest pain with difficulty breathing, a rapid heart rate and/or a feeling of passing out call emergency medical services 911.    Prophylactic measure: Prophylactic measure  Dementia without behavioral disturbance, unspecified dementia type: Continue with medications as prescribed by your doctor.  Maintain safe environment.  Maintain adequate nutrition, hydration.  Follow-up with your primary care physician within 1 week. Call for appointment.    Uncomplicated asthma, unspecified asthma severity, unspecified whether persistent: Stable.  Follow-up with your primary care physician within 1 week. Call for appointment.    Sepsis, due to unspecified organism: Take all antibiotics as ordered.  Call you Health care provider upon arrival home to make a one week follow up appointment.  If you develop fever, chills, malaise, or change in mental status call your Health Care Provider or go to the Emergency Department.  Nutrition is important, eat small frequent meals to help ensure you get adequate calories.  Do not stay in bed all day!  Increase your activity daily as tolerated.    PEG tube malfunction: PEG tube malfunction            RECS:  restart feeds - if increasing 02 requirements may need to stop again  most recent chest xray- shows effusion  chest PT  nebs around the clock  prednisone 50mg (down from solucortef) continue long oral taper  appreciate consultants       Please call with any questions.    Leigh Ann Resendez DO  [facility] Pulmonary/Sleep Medicine  [phone]

## 2019-07-26 NOTE — PROGRESS NOTE ADULT - SUBJECTIVE AND OBJECTIVE BOX
HPI:  Patient seen and examined at bedside on 6 Monti with her daughter at bedside.    Review Of Systems:    Unable to assess due to advanced dementia    Medications:  acetaminophen  Suppository .. 650 milliGRAM(s) Rectal every 6 hours PRN  ALBUTerol/ipratropium for Nebulization 3 milliLiter(s) Nebulizer every 6 hours  apixaban 2.5 milliGRAM(s) Oral two times a day  artificial tears (preservative free) Ophthalmic Solution 1 Drop(s) Both EYES four times a day  ascorbic acid 500 milliGRAM(s) Oral daily  BACItracin   Ointment 1 Application(s) Topical two times a day  carboxymethylcellulose 0.5% (Preservative-Free) Ophthalmic Solution 1 Drop(s) Both EYES three times a day PRN  chlorhexidine 2% Cloths 1 Application(s) Topical daily  clonazePAM  Tablet 1 milliGRAM(s) Oral at bedtime  Dakins Solution - 1/4 Strength 1 Application(s) Topical two times a day  dextrose 5%. 1000 milliLiter(s) IV Continuous <Continuous>  famotidine    Tablet 20 milliGRAM(s) Oral daily  ferrous    sulfate Liquid 300 milliGRAM(s) Enteral Tube daily  formoterol for nebulization 20 MICROGram(s) Nebulizer two times a day  gabapentin   Solution 300 milliGRAM(s) Oral every 12 hours  glucagon  Injectable 1 milliGRAM(s) IntraMuscular once PRN  insulin lispro (HumaLOG) corrective regimen sliding scale   SubCutaneous every 6 hours  levothyroxine Injectable 60 MICROGram(s) IV Push at bedtime  Medical Marijuana Awake Oil 2 milliLiter(s),Medical Marijuana Awake Oil 2 milliLiter(s) 2 milliLiter(s) Enteral Tube <User Schedule>  Medical Marijuana Calm Oil 2 milliLiter(s),Medical Marinjuana Calm Oil 2 milliLiter(s) 2 milliLiter(s) Enteral Tube <User Schedule>  Medical Marijuana [place] Oil 2 milliLiter(s),Medical Marijuana [place] Oil 2 milliLiter(s) 2 milliLiter(s) Enteral Tube <User Schedule>  metoprolol tartrate 12.5 milliGRAM(s) Oral two times a day  multivitamin/minerals/iron Oral Solution (CENTRUM) 15 milliLiter(s) Oral daily  nystatin Powder 1 Application(s) Topical three times a day PRN  predniSONE   Tablet 50 milliGRAM(s) Oral daily  QUEtiapine 25 milliGRAM(s) Oral at bedtime  sevelamer carbonate Powder 800 milliGRAM(s) Oral three times a day with meals  sodium chloride 0.9%. 1000 milliLiter(s) IV Continuous <Continuous>  tiZANidine 4 milliGRAM(s) Oral <User Schedule>  zinc sulfate 220 milliGRAM(s) Oral daily    PAST MEDICAL & SURGICAL HISTORY:  Type 2 diabetes mellitus  Dementia  DVT, lower extremity  CVA (cerebral vascular accident)  Fatty pancreas  PNA (pneumonia)  Pulmonary HTN  IGT (impaired glucose tolerance)  Ulcerative colitis  Acid reflux  Anxiety  Depression  Mouth sores  HLD (hyperlipidemia)  Asthma  S/P percutaneous endoscopic gastrostomy (PEG) tube placement: for dysphagia  Humeral head fracture  H/O: hysterectomy  H/O cataract extraction, left  History of knee replacement    Vitals:  T(C): 36.7 (07-26-19 @ 17:00), Max: 36.8 (07-25-19 @ 21:00)  HR: 110 (07-26-19 @ 17:00) (100 - 110)  BP: 145/82 (07-26-19 @ 17:00) (121/71 - 147/82)  BP(mean): --  RR: 18 (07-26-19 @ 17:00) (18 - 18)  SpO2: 99% (07-26-19 @ 17:00) (96% - 100%)  Wt(kg): --  Daily     Daily   I&O's Summary    25 Jul 2019 07:01  -  26 Jul 2019 07:00  --------------------------------------------------------  IN: 900 mL / OUT: 735 mL / NET: 165 mL    26 Jul 2019 07:01  -  26 Jul 2019 19:52  --------------------------------------------------------  IN: 300 mL / OUT: 290 mL / NET: 10 mL        Physical Exam:  Appearance: functional quadriplegia   Eyes: PERRLA, EOMI, pink conjunctiva, no scleral icterus   HENT: Normal oral mucosa  Cardiovascular: tachycardic S1, S2, no murmur, rub, or gallop; NO edema in hands or feet; no JVD  Procedural Access Site: Clean, dry, intact, without hematoma  Respiratory: bilateral air entry; poor inspiratory effort  Gastrointestinal: Soft, non-tender, non-distended, BS+, +PEG  Musculoskeletal: +contracted  Neurologic: functional quadriplegia   Lymphatic: No lymphadenopathy  Psychiatry: advanced dementia  Skin: multiple sites of skin breakdown                          7.3    13.29 )-----------( 280      ( 26 Jul 2019 08:33 )             22.7     07-26    137  |  95<L>  |  123<H>  ----------------------------<  108<H>  3.9   |  20<L>  |  3.27<H>    Ca    8.1<L>      26 Jul 2019 05:49  Mg     2.1     07-26

## 2019-07-26 NOTE — PROGRESS NOTE ADULT - SUBJECTIVE AND OBJECTIVE BOX
infectious diseases progress note:    Patient is a 69y old  Female who presents with a chief complaint of PEG tube dislodgement, fever, leukocytosis (25 Jul 2019 16:14)        Gastrostomy malfunction             Allergies    ASA; dye contrast (Anaphylaxis)  aspirin (Short breath)  divalproex sodium (Other (Unknown))  Flowers and Plants (Short breath)  Haldol (Other (Unknown))  penicillin (Short breath; Rash)  sulfa drugs (Short breath; Rash)  vancomycin (Rash; Urticaria; Hives)  Xanax (Other (Unknown))    Intolerances        ANTIBIOTICS/RELEVANT:  antimicrobials    immunologic:    OTHER:  acetaminophen  Suppository .. 650 milliGRAM(s) Rectal every 6 hours PRN  ALBUTerol/ipratropium for Nebulization 3 milliLiter(s) Nebulizer every 6 hours  apixaban 2.5 milliGRAM(s) Oral two times a day  artificial tears (preservative free) Ophthalmic Solution 1 Drop(s) Both EYES four times a day  ascorbic acid 500 milliGRAM(s) Oral daily  BACItracin   Ointment 1 Application(s) Topical two times a day  carboxymethylcellulose 0.5% (Preservative-Free) Ophthalmic Solution 1 Drop(s) Both EYES three times a day PRN  chlorhexidine 2% Cloths 1 Application(s) Topical daily  clonazePAM  Tablet 1 milliGRAM(s) Oral at bedtime  Dakins Solution - 1/4 Strength 1 Application(s) Topical two times a day  dextrose 5%. 1000 milliLiter(s) IV Continuous <Continuous>  famotidine    Tablet 20 milliGRAM(s) Oral daily  ferrous    sulfate Liquid 300 milliGRAM(s) Enteral Tube daily  formoterol for nebulization 20 MICROGram(s) Nebulizer two times a day  gabapentin   Solution 300 milliGRAM(s) Oral every 12 hours  glucagon  Injectable 1 milliGRAM(s) IntraMuscular once PRN  insulin lispro (HumaLOG) corrective regimen sliding scale   SubCutaneous every 6 hours  levothyroxine Injectable 60 MICROGram(s) IV Push at bedtime  Medical Marijuana Awake Oil 2 milliLiter(s),Medical Marijuana Awake Oil 2 milliLiter(s) 2 milliLiter(s) Enteral Tube <User Schedule>  Medical Marijuana Calm Oil 2 milliLiter(s),Medical [place] Calm Oil 2 milliLiter(s) 2 milliLiter(s) Enteral Tube <User Schedule>  Medical Marijuana [place] Oil 2 milliLiter(s),Medical Marijuana [place] Oil 2 milliLiter(s) 2 milliLiter(s) Enteral Tube <User Schedule>  metoprolol tartrate 12.5 milliGRAM(s) Oral two times a day  multivitamin/minerals/iron Oral Solution (CENTRUM) 15 milliLiter(s) Oral daily  nystatin Powder 1 Application(s) Topical three times a day PRN  predniSONE   Tablet 50 milliGRAM(s) Oral daily  QUEtiapine 25 milliGRAM(s) Oral at bedtime  sevelamer carbonate Powder 800 milliGRAM(s) Oral three times a day with meals  sodium chloride 0.9%. 1000 milliLiter(s) IV Continuous <Continuous>  tiZANidine 4 milliGRAM(s) Oral <User Schedule>  zinc sulfate 220 milliGRAM(s) Oral daily      Objective:  Vital Signs Last 24 Hrs  T(C): 36.5 (26 Jul 2019 05:00), Max: 36.8 (25 Jul 2019 21:00)  T(F): 97.7 (26 Jul 2019 05:00), Max: 98.3 (25 Jul 2019 21:00)  HR: 102 (26 Jul 2019 05:00) (100 - 110)  BP: 132/73 (26 Jul 2019 05:00) (121/71 - 154/80)  BP(mean): --  RR: 18 (26 Jul 2019 05:00) (18 - 18)  SpO2: 100% (26 Jul 2019 05:00) (96% - 100%)       Eyes:JACQUELIN, EOMI  Ear/Nose/Throat: no oral lesion, no sinus tenderness on percussion	  Neck:no JVD, no lymphadenopathy, supple  Respiratory: CTA maryjane decreased      Gastrointestinal:soft, (+) BS, no HSM  Extremities: contracted         LABS:                        7.7    13.98 )-----------( 289      ( 25 Jul 2019 07:56 )             24.3     07-26    137  |  95<L>  |  123<H>  ----------------------------<  108<H>  3.9   |  20<L>  |  3.27<H>    Ca    8.1<L>      26 Jul 2019 05:49  Mg     2.1     07-26              MICROBIOLOGY:    RECENT CULTURES:        RESPIRATORY CULTURES:              RADIOLOGY & ADDITIONAL STUDIES:        Pager [phone]  After 5 pm/weekends or if no response :[phone]

## 2019-07-27 LAB
ANION GAP SERPL CALC-SCNC: 23 MMOL/L — HIGH (ref 5–17)
BUN SERPL-MCNC: 127 MG/DL — HIGH (ref 7–23)
CALCIUM SERPL-MCNC: 8.4 MG/DL — SIGNIFICANT CHANGE UP (ref 8.4–10.5)
CHLORIDE SERPL-SCNC: 89 MMOL/L — LOW (ref 96–108)
CO2 SERPL-SCNC: 19 MMOL/L — LOW (ref 22–31)
CREAT SERPL-MCNC: 3.23 MG/DL — HIGH (ref 0.5–1.3)
GLUCOSE BLDC GLUCOMTR-MCNC: 147 MG/DL — HIGH (ref 70–99)
GLUCOSE BLDC GLUCOMTR-MCNC: 159 MG/DL — HIGH (ref 70–99)
GLUCOSE BLDC GLUCOMTR-MCNC: 164 MG/DL — HIGH (ref 70–99)
GLUCOSE BLDC GLUCOMTR-MCNC: 201 MG/DL — HIGH (ref 70–99)
GLUCOSE BLDC GLUCOMTR-MCNC: 223 MG/DL — HIGH (ref 70–99)
GLUCOSE SERPL-MCNC: 140 MG/DL — HIGH (ref 70–99)
HCT VFR BLD CALC: 25.9 % — LOW (ref 34.5–45)
HGB BLD-MCNC: 8.5 G/DL — LOW (ref 11.5–15.5)
MAGNESIUM SERPL-MCNC: 2 MG/DL — SIGNIFICANT CHANGE UP (ref 1.6–2.6)
MCHC RBC-ENTMCNC: 28.5 PG — SIGNIFICANT CHANGE UP (ref 27–34)
MCHC RBC-ENTMCNC: 32.8 GM/DL — SIGNIFICANT CHANGE UP (ref 32–36)
MCV RBC AUTO: 86.9 FL — SIGNIFICANT CHANGE UP (ref 80–100)
PLATELET # BLD AUTO: 263 K/UL — SIGNIFICANT CHANGE UP (ref 150–400)
POTASSIUM SERPL-MCNC: 3.9 MMOL/L — SIGNIFICANT CHANGE UP (ref 3.5–5.3)
POTASSIUM SERPL-SCNC: 3.9 MMOL/L — SIGNIFICANT CHANGE UP (ref 3.5–5.3)
RBC # BLD: 2.98 M/UL — LOW (ref 3.8–5.2)
RBC # FLD: 15.2 % — HIGH (ref 10.3–14.5)
SODIUM SERPL-SCNC: 131 MMOL/L — LOW (ref 135–145)
WBC # BLD: 13.49 K/UL — HIGH (ref 3.8–10.5)
WBC # FLD AUTO: 13.49 K/UL — HIGH (ref 3.8–10.5)

## 2019-07-27 PROCEDURE — 99232 SBSQ HOSP IP/OBS MODERATE 35: CPT

## 2019-07-27 RX ADMIN — TIZANIDINE 4 MILLIGRAM(S): 4 TABLET ORAL at 08:47

## 2019-07-27 RX ADMIN — ZINC SULFATE TAB 220 MG (50 MG ZINC EQUIVALENT) 220 MILLIGRAM(S): 220 (50 ZN) TAB at 12:00

## 2019-07-27 RX ADMIN — Medication 1 APPLICATION(S): at 18:22

## 2019-07-27 RX ADMIN — Medication 1: at 23:30

## 2019-07-27 RX ADMIN — SEVELAMER CARBONATE 800 MILLIGRAM(S): 2400 POWDER, FOR SUSPENSION ORAL at 12:00

## 2019-07-27 RX ADMIN — Medication 1 DROP(S): at 12:00

## 2019-07-27 RX ADMIN — Medication 1 APPLICATION(S): at 05:18

## 2019-07-27 RX ADMIN — CHLORHEXIDINE GLUCONATE 1 APPLICATION(S): 213 SOLUTION TOPICAL at 12:01

## 2019-07-27 RX ADMIN — Medication 1 DROP(S): at 18:22

## 2019-07-27 RX ADMIN — Medication 3 MILLILITER(S): at 17:25

## 2019-07-27 RX ADMIN — Medication 20 MICROGRAM(S): at 18:21

## 2019-07-27 RX ADMIN — APIXABAN 2.5 MILLIGRAM(S): 2.5 TABLET, FILM COATED ORAL at 18:21

## 2019-07-27 RX ADMIN — QUETIAPINE FUMARATE 25 MILLIGRAM(S): 200 TABLET, FILM COATED ORAL at 22:26

## 2019-07-27 RX ADMIN — Medication 3 MILLILITER(S): at 05:18

## 2019-07-27 RX ADMIN — Medication 20 MICROGRAM(S): at 05:17

## 2019-07-27 RX ADMIN — Medication 15 MILLILITER(S): at 12:00

## 2019-07-27 RX ADMIN — Medication 60 MICROGRAM(S): at 22:27

## 2019-07-27 RX ADMIN — Medication 3 MILLILITER(S): at 12:00

## 2019-07-27 RX ADMIN — Medication 300 MILLIGRAM(S): at 12:00

## 2019-07-27 RX ADMIN — Medication 500 MILLIGRAM(S): at 12:00

## 2019-07-27 RX ADMIN — GABAPENTIN 300 MILLIGRAM(S): 400 CAPSULE ORAL at 21:25

## 2019-07-27 RX ADMIN — Medication 3 MILLILITER(S): at 23:29

## 2019-07-27 RX ADMIN — Medication 2: at 12:46

## 2019-07-27 RX ADMIN — Medication 2: at 17:25

## 2019-07-27 RX ADMIN — Medication 1: at 05:19

## 2019-07-27 RX ADMIN — Medication 3 MILLILITER(S): at 00:22

## 2019-07-27 RX ADMIN — FAMOTIDINE 20 MILLIGRAM(S): 10 INJECTION INTRAVENOUS at 12:00

## 2019-07-27 RX ADMIN — SEVELAMER CARBONATE 800 MILLIGRAM(S): 2400 POWDER, FOR SUSPENSION ORAL at 08:47

## 2019-07-27 RX ADMIN — Medication 1 APPLICATION(S): at 05:19

## 2019-07-27 RX ADMIN — Medication 12.5 MILLIGRAM(S): at 18:21

## 2019-07-27 RX ADMIN — TIZANIDINE 4 MILLIGRAM(S): 4 TABLET ORAL at 21:25

## 2019-07-27 RX ADMIN — Medication 1 DROP(S): at 05:18

## 2019-07-27 RX ADMIN — SEVELAMER CARBONATE 800 MILLIGRAM(S): 2400 POWDER, FOR SUSPENSION ORAL at 18:21

## 2019-07-27 RX ADMIN — APIXABAN 2.5 MILLIGRAM(S): 2.5 TABLET, FILM COATED ORAL at 05:11

## 2019-07-27 RX ADMIN — Medication 12.5 MILLIGRAM(S): at 05:10

## 2019-07-27 RX ADMIN — GABAPENTIN 300 MILLIGRAM(S): 400 CAPSULE ORAL at 08:47

## 2019-07-27 RX ADMIN — Medication 50 MILLIGRAM(S): at 05:10

## 2019-07-27 RX ADMIN — Medication 1 MILLIGRAM(S): at 22:26

## 2019-07-27 NOTE — PROGRESS NOTE ADULT - ASSESSMENT
Patient is 69 year-old woman with advanced dementia and functional quadriplegia presents with fevers, leukocytosis in the setting of PEG dislodgement. Patient with acute kidney injury that is likely multifactorial.   Patient still appears total volume overloaded with +JVD and diffuse edema/anasarca in the setting of worsening renal function. Suspect some of peripheral edema is due to lack of movement.  Transfuse and diurese PRN.    Antibiotics per ID.  PEG management and feeds per GI/nutrition.    Appreciate nephrology input: currently off Lasix gtt.    Trend daily labs. Avoid nephrotoxic agents.

## 2019-07-27 NOTE — PROGRESS NOTE ADULT - SUBJECTIVE AND OBJECTIVE BOX
HPI:  Patient seen and examined on 6 [place] with her , Caleb, at bedside.  Sacral wound examined and packed by wound-care.    Review Of Systems:        Unable to assess in the setting of advanced dementia     Medications:  acetaminophen  Suppository .. 650 milliGRAM(s) Rectal every 6 hours PRN  ALBUTerol/ipratropium for Nebulization 3 milliLiter(s) Nebulizer every 6 hours  apixaban 2.5 milliGRAM(s) Oral two times a day  artificial tears (preservative free) Ophthalmic Solution 1 Drop(s) Both EYES four times a day  ascorbic acid 500 milliGRAM(s) Oral daily  BACItracin   Ointment 1 Application(s) Topical two times a day  carboxymethylcellulose 0.5% (Preservative-Free) Ophthalmic Solution 1 Drop(s) Both EYES three times a day PRN  chlorhexidine 2% Cloths 1 Application(s) Topical daily  clonazePAM  Tablet 1 milliGRAM(s) Oral at bedtime  Dakins Solution - 1/4 Strength 1 Application(s) Topical two times a day  dextrose 5%. 1000 milliLiter(s) IV Continuous <Continuous>  famotidine    Tablet 20 milliGRAM(s) Oral daily  ferrous    sulfate Liquid 300 milliGRAM(s) Enteral Tube daily  formoterol for nebulization 20 MICROGram(s) Nebulizer two times a day  gabapentin   Solution 300 milliGRAM(s) Oral every 12 hours  glucagon  Injectable 1 milliGRAM(s) IntraMuscular once PRN  insulin lispro (HumaLOG) corrective regimen sliding scale   SubCutaneous every 6 hours  levothyroxine Injectable 60 MICROGram(s) IV Push at bedtime  Medical Marijuana Awake Oil 2 milliLiter(s),Medical Marijuana Awake Oil 2 milliLiter(s) 2 milliLiter(s) Enteral Tube <User Schedule>  Medical Marijuana Calm Oil 2 milliLiter(s),Medical [place] Calm Oil 2 milliLiter(s) 2 milliLiter(s) Enteral Tube <User Schedule>  Medical Marijuana [place] Oil 2 milliLiter(s),Medical Marijuana [place] Oil 2 milliLiter(s) 2 milliLiter(s) Enteral Tube <User Schedule>  metoprolol tartrate 12.5 milliGRAM(s) Oral two times a day  multivitamin/minerals/iron Oral Solution (CENTRUM) 15 milliLiter(s) Oral daily  nystatin Powder 1 Application(s) Topical three times a day PRN  predniSONE   Tablet 50 milliGRAM(s) Oral daily  QUEtiapine 25 milliGRAM(s) Oral at bedtime  sevelamer carbonate Powder 800 milliGRAM(s) Oral three times a day with meals  sodium chloride 0.9%. 1000 milliLiter(s) IV Continuous <Continuous>  tiZANidine 4 milliGRAM(s) Oral <User Schedule>  zinc sulfate 220 milliGRAM(s) Oral daily    PAST MEDICAL & SURGICAL HISTORY:  Type 2 diabetes mellitus  Dementia  DVT, lower extremity  CVA (cerebral vascular accident)  Fatty pancreas  PNA (pneumonia)  Pulmonary HTN  IGT (impaired glucose tolerance)  Ulcerative colitis  Acid reflux  Anxiety  Depression  Mouth sores  HLD (hyperlipidemia)  Asthma  S/P percutaneous endoscopic gastrostomy (PEG) tube placement: for dysphagia  Humeral head fracture  H/O: hysterectomy  H/O cataract extraction, left  History of knee replacement    Vitals:  T(C): 36.6 (07-27-19 @ 13:00), Max: 37.2 (07-26-19 @ 21:00)  HR: 103 (07-27-19 @ 13:00) (103 - 113)  BP: 145/89 (07-27-19 @ 13:00) (145/81 - 158/85)  BP(mean): --  RR: 18 (07-27-19 @ 13:00) (18 - 18)  SpO2: 100% (07-27-19 @ 13:00) (95% - 100%)  Wt(kg): --  Daily     Daily   I&O's Summary    26 Jul 2019 07:01  -  27 Jul 2019 07:00  --------------------------------------------------------  IN: 600 mL / OUT: 690 mL / NET: -90 mL    27 Jul 2019 07:01  -  27 Jul 2019 13:08  --------------------------------------------------------  IN: 325 mL / OUT: 125 mL / NET: 200 mL        Physical Exam:  Appearance: functional quadriplegia   Eyes: PERRLA, EOMI, pink conjunctiva, no scleral icterus   HENT: Normal oral mucosa  Cardiovascular: tachycardic S1, S2, no murmur, rub, or gallop; NO edema in hands or feet; no JVD  Procedural Access Site: Clean, dry, intact, without hematoma  Respiratory: bilateral air entry; poor inspiratory effort  Gastrointestinal: Soft, non-tender, non-distended, BS+, +PEG  Musculoskeletal: +contracted  Neurologic: functional quadriplegia   Lymphatic: No lymphadenopathy  Psychiatry: advanced dementia  Skin: multiple sites of skin breakdown, stage IV sacral decub                          8.5    13.49 )-----------( 263      ( 27 Jul 2019 08:59 )             25.9     07-27    131<L>  |  89<L>  |  127<H>  ----------------------------<  140<H>  3.9   |  19<L>  |  3.23<H>    Ca    8.4      27 Jul 2019 06:28  Mg     2.0     07-27

## 2019-07-27 NOTE — PROGRESS NOTE ADULT - SUBJECTIVE AND OBJECTIVE BOX
NEPHROLOGY    Patient seen and examined.    MEDICATIONS  (STANDING):  ALBUTerol/ipratropium for Nebulization 3 milliLiter(s) Nebulizer every 6 hours  apixaban 2.5 milliGRAM(s) Oral two times a day  artificial tears (preservative free) Ophthalmic Solution 1 Drop(s) Both EYES four times a day  ascorbic acid 500 milliGRAM(s) Oral daily  BACItracin   Ointment 1 Application(s) Topical two times a day  chlorhexidine 2% Cloths 1 Application(s) Topical daily  clonazePAM  Tablet 1 milliGRAM(s) Oral at bedtime  Dakins Solution - 1/4 Strength 1 Application(s) Topical two times a day  dextrose 5%. 1000 milliLiter(s) (50 mL/Hr) IV Continuous <Continuous>  famotidine    Tablet 20 milliGRAM(s) Oral daily  ferrous    sulfate Liquid 300 milliGRAM(s) Enteral Tube daily  formoterol for nebulization 20 MICROGram(s) Nebulizer two times a day  gabapentin   Solution 300 milliGRAM(s) Oral every 12 hours  insulin lispro (HumaLOG) corrective regimen sliding scale   SubCutaneous every 6 hours  levothyroxine Injectable 60 MICROGram(s) IV Push at bedtime  Medical Marijuana Awake Oil 2 milliLiter(s),Medical Marijuana Awake Oil 2 milliLiter(s) 2 milliLiter(s) Enteral Tube <User Schedule>  Medical Marijuana Calm Oil 2 milliLiter(s),Medical [place]juana Calm Oil 2 milliLiter(s) 2 milliLiter(s) Enteral Tube <User Schedule>  Medical Marijuana [place] Oil 2 milliLiter(s),Medical Marijuana [place] Oil 2 milliLiter(s) 2 milliLiter(s) Enteral Tube <User Schedule>  metoprolol tartrate 12.5 milliGRAM(s) Oral two times a day  multivitamin/minerals/iron Oral Solution (CENTRUM) 15 milliLiter(s) Oral daily  predniSONE   Tablet 50 milliGRAM(s) Oral daily  QUEtiapine 25 milliGRAM(s) Oral at bedtime  sevelamer carbonate Powder 800 milliGRAM(s) Oral three times a day with meals  sodium chloride 0.9%. 1000 milliLiter(s) (75 mL/Hr) IV Continuous <Continuous>  tiZANidine 4 milliGRAM(s) Oral <User Schedule>  zinc sulfate 220 milliGRAM(s) Oral daily    VITALS:  T(C): , Max: 37.2 (07-26-19 @ 21:00)  T(F): , Max: 98.9 (07-26-19 @ 21:00)  HR: 107 (07-27-19 @ 09:00)  BP: 151/89 (07-27-19 @ 09:00)  BP(mean): --  RR: 18 (07-27-19 @ 09:00)  SpO2: 97% (07-27-19 @ 09:00)  Wt(kg): --  I and O's:    07-26 @ 07:01  -  07-27 @ 07:00  --------------------------------------------------------  IN: 600 mL / OUT: 690 mL / NET: -90 mL          REVIEW OF SYSTEMS:  Full ROS done and were negative unless otherwise indicated in HPI/assessment.     PHYSICAL EXAM:  Constitutional: NAD  Neck:  +  JVD  Respiratory: poor effort   Cardiovascular: S1 and S2  Gastrointestinal: BS+, soft, NT/ND  Extremities: +  peripheral edema  Neurological: unable to assess  Psychiatric: unable to assess  :+  Gavin  Skin: PU present  Access: Not applicable        LABS:                        8.5    13.49 )-----------( 263      ( 27 Jul 2019 08:59 )             25.9     07-27    131<L>  |  89<L>  |  127<H>  ----------------------------<  140<H>  3.9   |  19<L>  |  3.23<H>    Ca    8.4      27 Jul 2019 06:28  Mg     2.0     07-27        Urine Studies:        RADIOLOGY & ADDITIONAL STUDIES:      ASSESSMENT  		  69 year old female with multiple prolonged hospitalizations h PMH of Advanced dementia (nonverbal, dysphagia, with PEG tube, functional quadriplegic, chronic Gavin catheter) sacral state 4 pressure ulcer, heel pressure ulcers, asthma on chronic prednisone, HLD, CVA, s is, 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  PEG replaced in IR-patient on Nepro feeds now.  - TRANG likely multifactorial with Creatinine elevated  but urination is still >.5cc/kg (690cc/24 hours)  - Anemia; Hgb low;  transfused with 1 unit of PRBCs on 7/26 and presents improvement in H/H       RECOMMEND:  - supplement potassium  - Leave on Nepro feeds   - Maintain gavin catheter(chronic gavin)   - Strict I & Os      Grisel Leblanc NP-DON  [facility]  [phone]

## 2019-07-27 NOTE — PROGRESS NOTE ADULT - SUBJECTIVE AND OBJECTIVE BOX
---___---___---___---___---___---___ ---___---___---___---___---___---___---___---___---                  M E D I C A L   A T T E N D I N G   P R O G R E S S   N O T E  ---___---___---___---___---___---___ ---___---___---___---___---___---___---___---___---        ================================================    ++CHIEF COMPLAINT:   Patient is a 69y old  Female who presents with a chief complaint of PEG tube dislodgement, fever, leukocytosis (2019 13:08)      libia still ongoing otherwise stable     ---___---___---___---___---___---  PAST MEDICAL / Surgical  HISTORY:  PAST MEDICAL & SURGICAL HISTORY:  Type 2 diabetes mellitus  Dementia  DVT, lower extremity  CVA (cerebral vascular accident)  Fatty pancreas  PNA (pneumonia)  Pulmonary HTN  IGT (impaired glucose tolerance)  Ulcerative colitis  Acid reflux  Anxiety  Depression  Mouth sores  HLD (hyperlipidemia)  Asthma  S/P percutaneous endoscopic gastrostomy (PEG) tube placement: for dysphagia  Humeral head fracture  H/O: hysterectomy  H/O cataract extraction, left  History of knee replacement      ---___---___---___---___---___---  FAMILY HISTORY:   FAMILY HISTORY:  Family history of dementia (Grandparent)  Family history of colon cancer (Grandparent)        ---___---___---___---___---___---  ALLERGIES:   Allergies    ASA; dye contrast (Anaphylaxis)  aspirin (Short breath)  divalproex sodium (Other (Unknown))  Flowers and Plants (Short breath)  Haldol (Other (Unknown))  penicillin (Short breath; Rash)  sulfa drugs (Short breath; Rash)  vancomycin (Rash; Urticaria; Hives)  Xanax (Other (Unknown))    Intolerances        ---___---___---___---___---___---  MEDICATIONS:  MEDICATIONS  (STANDING):  ALBUTerol/ipratropium for Nebulization 3 milliLiter(s) Nebulizer every 6 hours  apixaban 2.5 milliGRAM(s) Oral two times a day  artificial tears (preservative free) Ophthalmic Solution 1 Drop(s) Both EYES four times a day  ascorbic acid 500 milliGRAM(s) Oral daily  BACItracin   Ointment 1 Application(s) Topical two times a day  chlorhexidine 2% Cloths 1 Application(s) Topical daily  clonazePAM  Tablet 1 milliGRAM(s) Oral at bedtime  Dakins Solution - 1/4 Strength 1 Application(s) Topical two times a day  dextrose 5%. 1000 milliLiter(s) (50 mL/Hr) IV Continuous <Continuous>  famotidine    Tablet 20 milliGRAM(s) Oral daily  ferrous    sulfate Liquid 300 milliGRAM(s) Enteral Tube daily  formoterol for nebulization 20 MICROGram(s) Nebulizer two times a day  gabapentin   Solution 300 milliGRAM(s) Oral every 12 hours  insulin lispro (HumaLOG) corrective regimen sliding scale   SubCutaneous every 6 hours  levothyroxine Injectable 60 MICROGram(s) IV Push at bedtime  Medical Marijuana Awake Oil 2 milliLiter(s),Medical Marijuana Awake Oil 2 milliLiter(s) 2 milliLiter(s) Enteral Tube <User Schedule>  Medical Marijuana Calm Oil 2 milliLiter(s),Medical [place] Calm Oil 2 milliLiter(s) 2 milliLiter(s) Enteral Tube <User Schedule>  Medical Marijuana [place] Oil 2 milliLiter(s),Medical Marijuana [place] Oil 2 milliLiter(s) 2 milliLiter(s) Enteral Tube <User Schedule>  metoprolol tartrate 12.5 milliGRAM(s) Oral two times a day  multivitamin/minerals/iron Oral Solution (CENTRUM) 15 milliLiter(s) Oral daily  predniSONE   Tablet 50 milliGRAM(s) Oral daily  QUEtiapine 25 milliGRAM(s) Oral at bedtime  sevelamer carbonate Powder 800 milliGRAM(s) Oral three times a day with meals  sodium chloride 0.9%. 1000 milliLiter(s) (75 mL/Hr) IV Continuous <Continuous>  tiZANidine 4 milliGRAM(s) Oral <User Schedule>  zinc sulfate 220 milliGRAM(s) Oral daily    MEDICATIONS  (PRN):  acetaminophen  Suppository .. 650 milliGRAM(s) Rectal every 6 hours PRN Temp greater or equal to 38C (100.4F), Mild Pain (1 - 3)  carboxymethylcellulose 0.5% (Preservative-Free) Ophthalmic Solution 1 Drop(s) Both EYES three times a day PRN dry eyes  glucagon  Injectable 1 milliGRAM(s) IntraMuscular once PRN Glucose LESS THAN 70 milligrams/deciliter  nystatin Powder 1 Application(s) Topical three times a day PRN under breasts      ---___---___---___---___---___---  REVIEW OF SYSTEM:    unable to obtain     ---___---___---___---___---___---  VITAL SIGNS:  69y , CAPILLARY BLOOD GLUCOSE      POCT Blood Glucose.: 223 mg/dL (2019 12:17)    T(C): 36.6 (19 @ 13:00), Max: 37.2 (19 @ 21:00)  HR: 103 (19 @ 13:00) (103 - 113)  BP: 145/89 (19 @ 13:00) (145/81 - 158/85)  RR: 18 (19 @ 13:00) (18 - 18)  SpO2: 100% (19 @ 13:00) (95% - 100%)  ---___---___---___---___---___---  PHYSICAL EXAM:    GEN: A&O X0 , NAD , comfortable  HEENT: NCAT, PERRL, MMM, hearing intact  Neck: supple , no JVD  CVS: S1S2 , regular , No M/R/G appreciated  PULM: CTA B/L,  no W/R/R appreciated decreased breath sounds noted  ABD.: soft. non tender, non distended,  bowel sounds present (+) peg   Extrem: intact pulses , edema noted fracture left leg  Derm: No rash , no ecchymoses sacral and multiple decubiti noted         ---___---___---___---___---___---            LAB AND IMAGIN.5    13.49 )-----------( 263      ( 2019 08:59 )             25.9               07-27    131<L>  |  89<L>  |  127<H>  ----------------------------<  140<H>  3.9   |  19<L>  |  3.23<H>    Ca    8.4      2019 06:28  Mg     2.0     07                                  [All pertinent / recent Imaging reviewed]         ---___---___---___---___---___---___ ---___---___---___---___---                         A S S E S S M E N T   A N D   P L A N :      HEALTH ISSUES - PROBLEM Dx:  libia continue current treatment as per renal  chf on lopressor  sacral and multiple decubitus continue wound care   agitation seroquel and klonopin   chronic painfrom fracture on medical marijuana and neurontin  -GI/DVT Prophylaxis. on eliquis h/o dvt   dm2 on riss  asthma on budesonide and pulmonary following    overall prognosis poor    --------------------------------------------    ___________________________  Thank you,  Deniz Bolden  [phone]

## 2019-07-28 LAB
ANION GAP SERPL CALC-SCNC: 21 MMOL/L — HIGH (ref 5–17)
BUN SERPL-MCNC: 131 MG/DL — HIGH (ref 7–23)
CALCIUM SERPL-MCNC: 8.3 MG/DL — LOW (ref 8.4–10.5)
CHLORIDE SERPL-SCNC: 90 MMOL/L — LOW (ref 96–108)
CO2 SERPL-SCNC: 19 MMOL/L — LOW (ref 22–31)
CREAT SERPL-MCNC: 3.26 MG/DL — HIGH (ref 0.5–1.3)
GLUCOSE BLDC GLUCOMTR-MCNC: 139 MG/DL — HIGH (ref 70–99)
GLUCOSE BLDC GLUCOMTR-MCNC: 155 MG/DL — HIGH (ref 70–99)
GLUCOSE BLDC GLUCOMTR-MCNC: 188 MG/DL — HIGH (ref 70–99)
GLUCOSE BLDC GLUCOMTR-MCNC: 190 MG/DL — HIGH (ref 70–99)
GLUCOSE SERPL-MCNC: 121 MG/DL — HIGH (ref 70–99)
HCT VFR BLD CALC: 24.9 % — LOW (ref 34.5–45)
HGB BLD-MCNC: 8.1 G/DL — LOW (ref 11.5–15.5)
MCHC RBC-ENTMCNC: 28.7 PG — SIGNIFICANT CHANGE UP (ref 27–34)
MCHC RBC-ENTMCNC: 32.5 GM/DL — SIGNIFICANT CHANGE UP (ref 32–36)
MCV RBC AUTO: 88.3 FL — SIGNIFICANT CHANGE UP (ref 80–100)
PLATELET # BLD AUTO: 239 K/UL — SIGNIFICANT CHANGE UP (ref 150–400)
POTASSIUM SERPL-MCNC: 3.7 MMOL/L — SIGNIFICANT CHANGE UP (ref 3.5–5.3)
POTASSIUM SERPL-SCNC: 3.7 MMOL/L — SIGNIFICANT CHANGE UP (ref 3.5–5.3)
RBC # BLD: 2.82 M/UL — LOW (ref 3.8–5.2)
RBC # FLD: 15.3 % — HIGH (ref 10.3–14.5)
SODIUM SERPL-SCNC: 130 MMOL/L — LOW (ref 135–145)
WBC # BLD: 18.43 K/UL — HIGH (ref 3.8–10.5)
WBC # FLD AUTO: 18.43 K/UL — HIGH (ref 3.8–10.5)

## 2019-07-28 PROCEDURE — 99233 SBSQ HOSP IP/OBS HIGH 50: CPT

## 2019-07-28 PROCEDURE — 99232 SBSQ HOSP IP/OBS MODERATE 35: CPT

## 2019-07-28 RX ADMIN — Medication 1 APPLICATION(S): at 06:39

## 2019-07-28 RX ADMIN — Medication 1 DROP(S): at 17:26

## 2019-07-28 RX ADMIN — APIXABAN 2.5 MILLIGRAM(S): 2.5 TABLET, FILM COATED ORAL at 06:37

## 2019-07-28 RX ADMIN — Medication 1: at 12:36

## 2019-07-28 RX ADMIN — GABAPENTIN 300 MILLIGRAM(S): 400 CAPSULE ORAL at 08:43

## 2019-07-28 RX ADMIN — TIZANIDINE 4 MILLIGRAM(S): 4 TABLET ORAL at 08:43

## 2019-07-28 RX ADMIN — Medication 1 DROP(S): at 12:35

## 2019-07-28 RX ADMIN — Medication 1 APPLICATION(S): at 17:27

## 2019-07-28 RX ADMIN — Medication 1 MILLIGRAM(S): at 22:02

## 2019-07-28 RX ADMIN — Medication 50 MILLIGRAM(S): at 06:37

## 2019-07-28 RX ADMIN — GABAPENTIN 300 MILLIGRAM(S): 400 CAPSULE ORAL at 20:57

## 2019-07-28 RX ADMIN — Medication 1: at 18:23

## 2019-07-28 RX ADMIN — Medication 3 MILLILITER(S): at 17:25

## 2019-07-28 RX ADMIN — TIZANIDINE 4 MILLIGRAM(S): 4 TABLET ORAL at 20:58

## 2019-07-28 RX ADMIN — Medication 12.5 MILLIGRAM(S): at 06:37

## 2019-07-28 RX ADMIN — Medication 15 MILLILITER(S): at 12:34

## 2019-07-28 RX ADMIN — Medication 60 MICROGRAM(S): at 22:03

## 2019-07-28 RX ADMIN — Medication 1 APPLICATION(S): at 18:24

## 2019-07-28 RX ADMIN — SEVELAMER CARBONATE 800 MILLIGRAM(S): 2400 POWDER, FOR SUSPENSION ORAL at 12:34

## 2019-07-28 RX ADMIN — APIXABAN 2.5 MILLIGRAM(S): 2.5 TABLET, FILM COATED ORAL at 17:27

## 2019-07-28 RX ADMIN — Medication 3 MILLILITER(S): at 06:32

## 2019-07-28 RX ADMIN — CHLORHEXIDINE GLUCONATE 1 APPLICATION(S): 213 SOLUTION TOPICAL at 12:34

## 2019-07-28 RX ADMIN — SEVELAMER CARBONATE 800 MILLIGRAM(S): 2400 POWDER, FOR SUSPENSION ORAL at 17:25

## 2019-07-28 RX ADMIN — Medication 20 MICROGRAM(S): at 18:23

## 2019-07-28 RX ADMIN — ZINC SULFATE TAB 220 MG (50 MG ZINC EQUIVALENT) 220 MILLIGRAM(S): 220 (50 ZN) TAB at 12:35

## 2019-07-28 RX ADMIN — Medication 20 MICROGRAM(S): at 06:33

## 2019-07-28 RX ADMIN — FAMOTIDINE 20 MILLIGRAM(S): 10 INJECTION INTRAVENOUS at 12:35

## 2019-07-28 RX ADMIN — Medication 3 MILLILITER(S): at 12:34

## 2019-07-28 RX ADMIN — Medication 300 MILLIGRAM(S): at 12:34

## 2019-07-28 RX ADMIN — Medication 500 MILLIGRAM(S): at 12:36

## 2019-07-28 RX ADMIN — QUETIAPINE FUMARATE 25 MILLIGRAM(S): 200 TABLET, FILM COATED ORAL at 22:02

## 2019-07-28 RX ADMIN — Medication 12.5 MILLIGRAM(S): at 17:26

## 2019-07-28 RX ADMIN — SEVELAMER CARBONATE 800 MILLIGRAM(S): 2400 POWDER, FOR SUSPENSION ORAL at 08:43

## 2019-07-28 RX ADMIN — Medication 1 DROP(S): at 07:01

## 2019-07-28 NOTE — PROGRESS NOTE ADULT - SUBJECTIVE AND OBJECTIVE BOX
HPI:  Patient seen and examined on 6 [place] with her daughter at bedside.  No events overnight.    Review Of Systems:   Unable to assess in the setting of advanced dementia    Medications:  acetaminophen  Suppository .. 650 milliGRAM(s) Rectal every 6 hours PRN  ALBUTerol/ipratropium for Nebulization 3 milliLiter(s) Nebulizer every 6 hours  apixaban 2.5 milliGRAM(s) Oral two times a day  artificial tears (preservative free) Ophthalmic Solution 1 Drop(s) Both EYES four times a day  ascorbic acid 500 milliGRAM(s) Oral daily  BACItracin   Ointment 1 Application(s) Topical two times a day  carboxymethylcellulose 0.5% (Preservative-Free) Ophthalmic Solution 1 Drop(s) Both EYES three times a day PRN  chlorhexidine 2% Cloths 1 Application(s) Topical daily  clonazePAM  Tablet 1 milliGRAM(s) Oral at bedtime  Dakins Solution - 1/4 Strength 1 Application(s) Topical two times a day  dextrose 5%. 1000 milliLiter(s) IV Continuous <Continuous>  famotidine    Tablet 20 milliGRAM(s) Oral daily  ferrous    sulfate Liquid 300 milliGRAM(s) Enteral Tube daily  formoterol for nebulization 20 MICROGram(s) Nebulizer two times a day  gabapentin   Solution 300 milliGRAM(s) Oral every 12 hours  glucagon  Injectable 1 milliGRAM(s) IntraMuscular once PRN  insulin lispro (HumaLOG) corrective regimen sliding scale   SubCutaneous every 6 hours  levothyroxine Injectable 60 MICROGram(s) IV Push at bedtime  Medical Marijuana Awake Oil 2 milliLiter(s),Medical Marijuana Awake Oil 2 milliLiter(s) 2 milliLiter(s) Enteral Tube <User Schedule>  Medical Marijuana Calm Oil 2 milliLiter(s),Medical [place] Calm Oil 2 milliLiter(s) 2 milliLiter(s) Enteral Tube <User Schedule>  Medical Marijuana [place] Oil 2 milliLiter(s),Medical Marijuana [place] Oil 2 milliLiter(s) 2 milliLiter(s) Enteral Tube <User Schedule>  metoprolol tartrate 12.5 milliGRAM(s) Oral two times a day  multivitamin/minerals/iron Oral Solution (CENTRUM) 15 milliLiter(s) Oral daily  nystatin Powder 1 Application(s) Topical three times a day PRN  predniSONE   Tablet 50 milliGRAM(s) Oral daily  QUEtiapine 25 milliGRAM(s) Oral at bedtime  sevelamer carbonate Powder 800 milliGRAM(s) Oral three times a day with meals  sodium chloride 0.9%. 1000 milliLiter(s) IV Continuous <Continuous>  tiZANidine 4 milliGRAM(s) Oral <User Schedule>  zinc sulfate 220 milliGRAM(s) Oral daily    PAST MEDICAL & SURGICAL HISTORY:  Type 2 diabetes mellitus  Dementia  DVT, lower extremity  CVA (cerebral vascular accident)  Fatty pancreas  PNA (pneumonia)  Pulmonary HTN  IGT (impaired glucose tolerance)  Ulcerative colitis  Acid reflux  Anxiety  Depression  Mouth sores  HLD (hyperlipidemia)  Asthma  S/P percutaneous endoscopic gastrostomy (PEG) tube placement: for dysphagia  Humeral head fracture  H/O: hysterectomy  H/O cataract extraction, left  History of knee replacement    Vitals:  T(C): 36.6 (07-28-19 @ 13:00), Max: 36.7 (07-27-19 @ 17:00)  HR: 102 (07-28-19 @ 13:00) (98 - 107)  BP: 137/83 (07-28-19 @ 13:00) (114/74 - 150/101)  BP(mean): --  RR: 20 (07-28-19 @ 13:00) (18 - 20)  SpO2: 100% (07-28-19 @ 13:00) (97% - 100%)  Wt(kg): --  Daily     Daily   I&O's Summary    27 Jul 2019 07:01  -  28 Jul 2019 07:00  --------------------------------------------------------  IN: 1660 mL / OUT: 490 mL / NET: 1170 mL    28 Jul 2019 07:01  -  28 Jul 2019 14:49  --------------------------------------------------------  IN: 400 mL / OUT: 150 mL / NET: 250 mL        Physical Exam:  Appearance: functional quadriplegia   Eyes: PERRLA, EOMI, pink conjunctiva, no scleral icterus   HENT: Normal oral mucosa  Cardiovascular: tachycardic S1, S2, no murmur, rub, or gallop; NO edema in hands or feet; no JVD  Procedural Access Site: Clean, dry, intact, without hematoma  Respiratory: bilateral air entry; poor inspiratory effort  Gastrointestinal: Soft, non-tender, non-distended, BS+, +PEG  Musculoskeletal: +contracted  Neurologic: functional quadriplegia   Lymphatic: No lymphadenopathy  Psychiatry: advanced dementia  Skin: multiple sites of skin breakdown, stage IV sacral decub                          8.1    18.43 )-----------( 239      ( 28 Jul 2019 10:03 )             24.9     07-28    130<L>  |  90<L>  |  131<H>  ----------------------------<  121<H>  3.7   |  19<L>  |  3.26<H>    Ca    8.3<L>      28 Jul 2019 07:24  Mg     2.0     07-27                    ECG:    Echo:    Stress Testing:     Cath:    Imaging:    Interpretation of Telemetry:

## 2019-07-28 NOTE — PROGRESS NOTE ADULT - SUBJECTIVE AND OBJECTIVE BOX
NEPHROLOGY    Patient seen and examined in bed, awake.  at the bedside/very supportive.    MEDICATIONS  (STANDING):  ALBUTerol/ipratropium for Nebulization 3 milliLiter(s) Nebulizer every 6 hours  apixaban 2.5 milliGRAM(s) Oral two times a day  artificial tears (preservative free) Ophthalmic Solution 1 Drop(s) Both EYES four times a day  ascorbic acid 500 milliGRAM(s) Oral daily  BACItracin   Ointment 1 Application(s) Topical two times a day  chlorhexidine 2% Cloths 1 Application(s) Topical daily  clonazePAM  Tablet 1 milliGRAM(s) Oral at bedtime  Dakins Solution - 1/4 Strength 1 Application(s) Topical two times a day  dextrose 5%. 1000 milliLiter(s) (50 mL/Hr) IV Continuous <Continuous>  famotidine    Tablet 20 milliGRAM(s) Oral daily  ferrous    sulfate Liquid 300 milliGRAM(s) Enteral Tube daily  formoterol for nebulization 20 MICROGram(s) Nebulizer two times a day  gabapentin   Solution 300 milliGRAM(s) Oral every 12 hours  insulin lispro (HumaLOG) corrective regimen sliding scale   SubCutaneous every 6 hours  levothyroxine Injectable 60 MICROGram(s) IV Push at bedtime  Medical Marijuana Awake Oil 2 milliLiter(s),Medical Marijuana Awake Oil 2 milliLiter(s) 2 milliLiter(s) Enteral Tube <User Schedule>  Medical Marijuana Calm Oil 2 milliLiter(s),Medical [place] Calm Oil 2 milliLiter(s) 2 milliLiter(s) Enteral Tube <User Schedule>  Medical Marijuana [place] Oil 2 milliLiter(s),Medical Marijuana [place] Oil 2 milliLiter(s) 2 milliLiter(s) Enteral Tube <User Schedule>  metoprolol tartrate 12.5 milliGRAM(s) Oral two times a day  multivitamin/minerals/iron Oral Solution (CENTRUM) 15 milliLiter(s) Oral daily  predniSONE   Tablet 50 milliGRAM(s) Oral daily  QUEtiapine 25 milliGRAM(s) Oral at bedtime  sevelamer carbonate Powder 800 milliGRAM(s) Oral three times a day with meals  sodium chloride 0.9%. 1000 milliLiter(s) (75 mL/Hr) IV Continuous <Continuous>  tiZANidine 4 milliGRAM(s) Oral <User Schedule>  zinc sulfate 220 milliGRAM(s) Oral daily    VITALS:  T(C): , Max: 36.7 (07-27-19 @ 17:00)  T(F): , Max: 98 (07-27-19 @ 17:00)  HR: 104 (07-28-19 @ 09:00)  BP: 132/82 (07-28-19 @ 09:00)  BP(mean): --  RR: 18 (07-28-19 @ 09:00)  SpO2: 97% (07-28-19 @ 09:00)  Wt(kg): --  I and O's:    07-27 @ 07:01  -  07-28 @ 07:00  --------------------------------------------------------  IN: 1660 mL / OUT: 490 mL / NET: 1170 mL          REVIEW OF SYSTEMS:  Full ROS done and were negative unless otherwise indicated in HPI/assessment.     PHYSICAL EXAM:  Constitutional: NAD  Neck:  +  JVD  Respiratory: poor effort   Cardiovascular: S1 and S2  Gastrointestinal: BS+, soft, NT/ND  Extremities: +  peripheral edema  Neurological: unable to assess  Psychiatric: unable to assess  :+  Gavin  Skin: PU present  Access: Not applicable    LABS:                        8.5    13.49 )-----------( 263      ( 27 Jul 2019 08:59 )             25.9     07-28    130<L>  |  90<L>  |  131<H>  ----------------------------<  121<H>  3.7   |  19<L>  |  3.26<H>    Ca    8.3<L>      28 Jul 2019 07:24  Mg     2.0     07-27        Urine Studies:        RADIOLOGY & ADDITIONAL STUDIES:      ASSESSMENT  69 year old female with multiple prolonged hospitalizations h PMH of Advanced dementia (nonverbal, dysphagia, with PEG tube, functional quadriplegic, chronic Gavin catheter) sacral state 4 pressure ulcer, heel pressure ulcers, asthma on chronic prednisone, HLD, CVA, s is, 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  PEG replaced in IR-patient on Nepro feeds now.  - TRANG likely multifactorial with Creatinine elevated  but urination is still >.5cc/kg (490cc/24 hours)  - Anemia; Hgb low;  transfused with 1 unit of PRBCs on 7/26 and presents improvement in H/H       RECOMMEND:    - Leave on Nepro feeds   - Maintain gavin catheter(chronic gavin)   - Strict I & Os    Grisel Leblanc NP-C  [facility]  [phone]

## 2019-07-28 NOTE — PROGRESS NOTE ADULT - SUBJECTIVE AND OBJECTIVE BOX
---___---___---___---___---___---___ ---___---___---___---___---___---___---___---___---                  M E D I C A L   A T T E N D I N G   P R O G R E S S   N O T E  ---___---___---___---___---___---___ ---___---___---___---___---___---___---___---___---        ================================================    ++CHIEF COMPLAINT:   Patient is a 69y old  Female who presents with a chief complaint of PEG tube dislodgement, fever, leukocytosis (2019 09:59)      libia  stable.     ---___---___---___---___---___---  PAST MEDICAL / Surgical  HISTORY:  PAST MEDICAL & SURGICAL HISTORY:  Type 2 diabetes mellitus  Dementia  DVT, lower extremity  CVA (cerebral vascular accident)  Fatty pancreas  PNA (pneumonia)  Pulmonary HTN  IGT (impaired glucose tolerance)  Ulcerative colitis  Acid reflux  Anxiety  Depression  Mouth sores  HLD (hyperlipidemia)  Asthma  S/P percutaneous endoscopic gastrostomy (PEG) tube placement: for dysphagia  Humeral head fracture  H/O: hysterectomy  H/O cataract extraction, left  History of knee replacement      ---___---___---___---___---___---  FAMILY HISTORY:   FAMILY HISTORY:  Family history of dementia (Grandparent)  Family history of colon cancer (Grandparent)        ---___---___---___---___---___---  ALLERGIES:   Allergies    ASA; dye contrast (Anaphylaxis)  aspirin (Short breath)  divalproex sodium (Other (Unknown))  Flowers and Plants (Short breath)  Haldol (Other (Unknown))  penicillin (Short breath; Rash)  sulfa drugs (Short breath; Rash)  vancomycin (Rash; Urticaria; Hives)  Xanax (Other (Unknown))    Intolerances        ---___---___---___---___---___---  MEDICATIONS:  MEDICATIONS  (STANDING):  ALBUTerol/ipratropium for Nebulization 3 milliLiter(s) Nebulizer every 6 hours  apixaban 2.5 milliGRAM(s) Oral two times a day  artificial tears (preservative free) Ophthalmic Solution 1 Drop(s) Both EYES four times a day  ascorbic acid 500 milliGRAM(s) Oral daily  BACItracin   Ointment 1 Application(s) Topical two times a day  chlorhexidine 2% Cloths 1 Application(s) Topical daily  clonazePAM  Tablet 1 milliGRAM(s) Oral at bedtime  Dakins Solution - 1/4 Strength 1 Application(s) Topical two times a day  dextrose 5%. 1000 milliLiter(s) (50 mL/Hr) IV Continuous <Continuous>  famotidine    Tablet 20 milliGRAM(s) Oral daily  ferrous    sulfate Liquid 300 milliGRAM(s) Enteral Tube daily  formoterol for nebulization 20 MICROGram(s) Nebulizer two times a day  gabapentin   Solution 300 milliGRAM(s) Oral every 12 hours  insulin lispro (HumaLOG) corrective regimen sliding scale   SubCutaneous every 6 hours  levothyroxine Injectable 60 MICROGram(s) IV Push at bedtime  Medical Marijuana Awake Oil 2 milliLiter(s),Medical Marijuana Awake Oil 2 milliLiter(s) 2 milliLiter(s) Enteral Tube <User Schedule>  Medical Marijuana Calm Oil 2 milliLiter(s),Medical [place] Calm Oil 2 milliLiter(s) 2 milliLiter(s) Enteral Tube <User Schedule>  Medical Marijuana [place] Oil 2 milliLiter(s),Medical Marijuana [place] Oil 2 milliLiter(s) 2 milliLiter(s) Enteral Tube <User Schedule>  metoprolol tartrate 12.5 milliGRAM(s) Oral two times a day  multivitamin/minerals/iron Oral Solution (CENTRUM) 15 milliLiter(s) Oral daily  predniSONE   Tablet 50 milliGRAM(s) Oral daily  QUEtiapine 25 milliGRAM(s) Oral at bedtime  sevelamer carbonate Powder 800 milliGRAM(s) Oral three times a day with meals  sodium chloride 0.9%. 1000 milliLiter(s) (75 mL/Hr) IV Continuous <Continuous>  tiZANidine 4 milliGRAM(s) Oral <User Schedule>  zinc sulfate 220 milliGRAM(s) Oral daily    MEDICATIONS  (PRN):  acetaminophen  Suppository .. 650 milliGRAM(s) Rectal every 6 hours PRN Temp greater or equal to 38C (100.4F), Mild Pain (1 - 3)  carboxymethylcellulose 0.5% (Preservative-Free) Ophthalmic Solution 1 Drop(s) Both EYES three times a day PRN dry eyes  glucagon  Injectable 1 milliGRAM(s) IntraMuscular once PRN Glucose LESS THAN 70 milligrams/deciliter  nystatin Powder 1 Application(s) Topical three times a day PRN under breasts      ---___---___---___---___---___---  REVIEW OF SYSTEM:  unable  to obtain       ---___---___---___---___---___---  VITAL SIGNS:  69y , CAPILLARY BLOOD GLUCOSE      POCT Blood Glucose.: 188 mg/dL (2019 12:02)    T(C): 36.6 (19 @ 13:00), Max: 36.7 (19 @ 17:00)  HR: 102 (19 @ 13:00) (98 - 107)  BP: 137/83 (19 @ 13:00) (114/74 - 150/101)  RR: 20 (19 @ 13:00) (18 - 20)  SpO2: 100% (19 @ 13:00) (97% - 100%)  ---___---___---___---___---___---  PHYSICAL EXAM:    GEN: A&O X 0 , NAD , comfortable  HEENT: NCAT, PERRL, MMM, hearing intact  Neck: supple , no JVD  CVS: S1S2 , regular , No M/R/G appreciated  PULM: CTA B/L,  no W/R/R appreciated  ABD.: soft. non tender, non distended,  bowel sounds present peg noted   Extrem: intact pulses ,  edema noted to the legs and left wrist    Derm: No rash , no ecchymoses sacral and multiple lower extremity decubitus         ---___---___---___---___---___---            LAB AND IMAGIN.1    18.43 )-----------( 239      ( 2019 10:03 )             24.9               07-28    130<L>  |  90<L>  |  131<H>  ----------------------------<  121<H>  3.7   |  19<L>  |  3.26<H>    Ca    8.3<L>      2019 07:24  Mg     2.0     07-                                  [All pertinent / recent Imaging reviewed]         ---___---___---___---___---___---___ ---___---___---___---___---                         A S S E S S M E N T   A N D   P L A N :      HEALTH ISSUES - PROBLEM Dx:    libia stable continue to  monitor  chronic pain on neurontin and medical marijuana   dm2 on riss  htn on coreg   asthma continue budesonide  and prednisone   agitation on seroquel and klonopin   hypothyroid on synthroid   sacral decubitus and multiple wound decubitus continue wound care   -GI/DVT Prophylaxis. eliquis to continue    --------------------------------------------  Case discussed with    Education given on   ___________________________  Thank you,  Deniz Bolden  [phone]

## 2019-07-29 DIAGNOSIS — N17.9 ACUTE KIDNEY FAILURE, UNSPECIFIED: ICD-10-CM

## 2019-07-29 LAB
ANION GAP SERPL CALC-SCNC: 23 MMOL/L — HIGH (ref 5–17)
BUN SERPL-MCNC: 135 MG/DL — HIGH (ref 7–23)
CALCIUM SERPL-MCNC: 8.3 MG/DL — LOW (ref 8.4–10.5)
CHLORIDE SERPL-SCNC: 85 MMOL/L — LOW (ref 96–108)
CO2 SERPL-SCNC: 20 MMOL/L — LOW (ref 22–31)
CREAT SERPL-MCNC: 3.32 MG/DL — HIGH (ref 0.5–1.3)
GLUCOSE BLDC GLUCOMTR-MCNC: 152 MG/DL — HIGH (ref 70–99)
GLUCOSE BLDC GLUCOMTR-MCNC: 194 MG/DL — HIGH (ref 70–99)
GLUCOSE BLDC GLUCOMTR-MCNC: 206 MG/DL — HIGH (ref 70–99)
GLUCOSE BLDC GLUCOMTR-MCNC: 212 MG/DL — HIGH (ref 70–99)
GLUCOSE SERPL-MCNC: 138 MG/DL — HIGH (ref 70–99)
HCT VFR BLD CALC: 25.1 % — LOW (ref 34.5–45)
HGB BLD-MCNC: 8.4 G/DL — LOW (ref 11.5–15.5)
MCHC RBC-ENTMCNC: 29.3 PG — SIGNIFICANT CHANGE UP (ref 27–34)
MCHC RBC-ENTMCNC: 33.5 GM/DL — SIGNIFICANT CHANGE UP (ref 32–36)
MCV RBC AUTO: 87.5 FL — SIGNIFICANT CHANGE UP (ref 80–100)
PLATELET # BLD AUTO: 255 K/UL — SIGNIFICANT CHANGE UP (ref 150–400)
POTASSIUM SERPL-MCNC: 3.9 MMOL/L — SIGNIFICANT CHANGE UP (ref 3.5–5.3)
POTASSIUM SERPL-SCNC: 3.9 MMOL/L — SIGNIFICANT CHANGE UP (ref 3.5–5.3)
RBC # BLD: 2.87 M/UL — LOW (ref 3.8–5.2)
RBC # FLD: 15.5 % — HIGH (ref 10.3–14.5)
SODIUM SERPL-SCNC: 128 MMOL/L — LOW (ref 135–145)
WBC # BLD: 18.89 K/UL — HIGH (ref 3.8–10.5)
WBC # FLD AUTO: 18.89 K/UL — HIGH (ref 3.8–10.5)

## 2019-07-29 PROCEDURE — 99222 1ST HOSP IP/OBS MODERATE 55: CPT | Mod: GC

## 2019-07-29 PROCEDURE — 99232 SBSQ HOSP IP/OBS MODERATE 35: CPT

## 2019-07-29 PROCEDURE — 76775 US EXAM ABDO BACK WALL LIM: CPT | Mod: 26

## 2019-07-29 RX ORDER — CLONAZEPAM 1 MG
1 TABLET ORAL AT BEDTIME
Refills: 0 | Status: DISCONTINUED | OUTPATIENT
Start: 2019-07-29 | End: 2019-08-05

## 2019-07-29 RX ADMIN — Medication 500 MILLIGRAM(S): at 12:15

## 2019-07-29 RX ADMIN — Medication 50 MILLIGRAM(S): at 06:02

## 2019-07-29 RX ADMIN — Medication 3 MILLILITER(S): at 18:28

## 2019-07-29 RX ADMIN — SEVELAMER CARBONATE 800 MILLIGRAM(S): 2400 POWDER, FOR SUSPENSION ORAL at 18:28

## 2019-07-29 RX ADMIN — Medication 3 MILLILITER(S): at 00:05

## 2019-07-29 RX ADMIN — SEVELAMER CARBONATE 800 MILLIGRAM(S): 2400 POWDER, FOR SUSPENSION ORAL at 12:17

## 2019-07-29 RX ADMIN — APIXABAN 2.5 MILLIGRAM(S): 2.5 TABLET, FILM COATED ORAL at 06:03

## 2019-07-29 RX ADMIN — Medication 1 APPLICATION(S): at 06:06

## 2019-07-29 RX ADMIN — Medication 300 MILLIGRAM(S): at 12:14

## 2019-07-29 RX ADMIN — Medication 1 DROP(S): at 18:28

## 2019-07-29 RX ADMIN — Medication 12.5 MILLIGRAM(S): at 18:29

## 2019-07-29 RX ADMIN — Medication 1 DROP(S): at 12:16

## 2019-07-29 RX ADMIN — Medication 15 MILLILITER(S): at 12:14

## 2019-07-29 RX ADMIN — Medication 60 MICROGRAM(S): at 22:40

## 2019-07-29 RX ADMIN — GABAPENTIN 300 MILLIGRAM(S): 400 CAPSULE ORAL at 09:13

## 2019-07-29 RX ADMIN — GABAPENTIN 300 MILLIGRAM(S): 400 CAPSULE ORAL at 21:26

## 2019-07-29 RX ADMIN — TIZANIDINE 4 MILLIGRAM(S): 4 TABLET ORAL at 21:26

## 2019-07-29 RX ADMIN — CHLORHEXIDINE GLUCONATE 1 APPLICATION(S): 213 SOLUTION TOPICAL at 12:17

## 2019-07-29 RX ADMIN — QUETIAPINE FUMARATE 25 MILLIGRAM(S): 200 TABLET, FILM COATED ORAL at 22:39

## 2019-07-29 RX ADMIN — ZINC SULFATE TAB 220 MG (50 MG ZINC EQUIVALENT) 220 MILLIGRAM(S): 220 (50 ZN) TAB at 12:17

## 2019-07-29 RX ADMIN — Medication 1: at 00:05

## 2019-07-29 RX ADMIN — Medication 1 APPLICATION(S): at 18:30

## 2019-07-29 RX ADMIN — APIXABAN 2.5 MILLIGRAM(S): 2.5 TABLET, FILM COATED ORAL at 18:29

## 2019-07-29 RX ADMIN — Medication 1 APPLICATION(S): at 18:29

## 2019-07-29 RX ADMIN — SEVELAMER CARBONATE 800 MILLIGRAM(S): 2400 POWDER, FOR SUSPENSION ORAL at 09:13

## 2019-07-29 RX ADMIN — Medication 20 MICROGRAM(S): at 18:29

## 2019-07-29 RX ADMIN — FAMOTIDINE 20 MILLIGRAM(S): 10 INJECTION INTRAVENOUS at 12:15

## 2019-07-29 RX ADMIN — TIZANIDINE 4 MILLIGRAM(S): 4 TABLET ORAL at 09:13

## 2019-07-29 RX ADMIN — Medication 3 MILLILITER(S): at 12:14

## 2019-07-29 RX ADMIN — Medication 2: at 13:22

## 2019-07-29 RX ADMIN — Medication 1 APPLICATION(S): at 06:05

## 2019-07-29 RX ADMIN — Medication 12.5 MILLIGRAM(S): at 06:04

## 2019-07-29 RX ADMIN — Medication 20 MICROGRAM(S): at 07:46

## 2019-07-29 RX ADMIN — Medication 3 MILLILITER(S): at 06:05

## 2019-07-29 RX ADMIN — Medication 1 MILLIGRAM(S): at 22:39

## 2019-07-29 RX ADMIN — Medication 1: at 06:14

## 2019-07-29 RX ADMIN — Medication 1 DROP(S): at 06:04

## 2019-07-29 RX ADMIN — Medication 1: at 18:41

## 2019-07-29 NOTE — CONSULT NOTE ADULT - REASON FOR ADMISSION
PEG tube dislodgement, fever, leukocytosis

## 2019-07-29 NOTE — CONSULT NOTE ADULT - PROBLEM SELECTOR RECOMMENDATION 9
TRANG likely multifactorial in setting sepsis PNA, ATN vanco toxicity, septic shock.    - On admission, patient's SCr 0.42 (~baseline 0.4), SCr trended up since July 4th 0.65 to 1.12), SCr peaked 3.45 (on July 22)  - renal U/S 7/7/19 showed R kidney size 11.3 cm and L kidney size 6.7 cm (limited exam d/t positioning), no hydro or mass.  - vanco trough 23.2(on 7/3) and 33.6 (on 7/7)    PLAN:  - please obtain renal duplex ultrasound to reassess kidney size  - avoid nephrotoxic medications, renally dose meds, maintain normotensive  - trend SCr, UOP (chronic gavin)

## 2019-07-29 NOTE — PROGRESS NOTE ADULT - SUBJECTIVE AND OBJECTIVE BOX
PULMONARY PROGRESS NOTE    MYRIAM HEATH  MRN-[id number]    Patient is a 69y old  Female who presents with a chief complaint of PEG tube dislodgement, fever, leukocytosis (29 Jul 2019 07:34)      HPI:  -  -    ROS:   -    ACTIVE MEDICATION LIST:  MEDICATIONS  (STANDING):  ALBUTerol/ipratropium for Nebulization 3 milliLiter(s) Nebulizer every 6 hours  apixaban 2.5 milliGRAM(s) Oral two times a day  artificial tears (preservative free) Ophthalmic Solution 1 Drop(s) Both EYES four times a day  ascorbic acid 500 milliGRAM(s) Oral daily  BACItracin   Ointment 1 Application(s) Topical two times a day  chlorhexidine 2% Cloths 1 Application(s) Topical daily  clonazePAM  Tablet 1 milliGRAM(s) Oral at bedtime  Dakins Solution - 1/4 Strength 1 Application(s) Topical two times a day  dextrose 5%. 1000 milliLiter(s) (50 mL/Hr) IV Continuous <Continuous>  famotidine    Tablet 20 milliGRAM(s) Oral daily  ferrous    sulfate Liquid 300 milliGRAM(s) Enteral Tube daily  formoterol for nebulization 20 MICROGram(s) Nebulizer two times a day  gabapentin   Solution 300 milliGRAM(s) Oral every 12 hours  insulin lispro (HumaLOG) corrective regimen sliding scale   SubCutaneous every 6 hours  levothyroxine Injectable 60 MICROGram(s) IV Push at bedtime  Medical Marijuana Awake Oil 2 milliLiter(s),Medical Marijuana Awake Oil 2 milliLiter(s) 2 milliLiter(s) Enteral Tube <User Schedule>  Medical Marijuana Calm Oil 2 milliLiter(s),Medical Marinjuana Calm Oil 2 milliLiter(s) 2 milliLiter(s) Enteral Tube <User Schedule>  Medical Marijuana [place] Oil 2 milliLiter(s),Medical Marijuana [place] Oil 2 milliLiter(s) 2 milliLiter(s) Enteral Tube <User Schedule>  metoprolol tartrate 12.5 milliGRAM(s) Oral two times a day  multivitamin/minerals/iron Oral Solution (CENTRUM) 15 milliLiter(s) Oral daily  QUEtiapine 25 milliGRAM(s) Oral at bedtime  sevelamer carbonate Powder 800 milliGRAM(s) Oral three times a day with meals  tiZANidine 4 milliGRAM(s) Oral <User Schedule>  zinc sulfate 220 milliGRAM(s) Oral daily    MEDICATIONS  (PRN):  acetaminophen  Suppository .. 650 milliGRAM(s) Rectal every 6 hours PRN Temp greater or equal to 38C (100.4F), Mild Pain (1 - 3)  carboxymethylcellulose 0.5% (Preservative-Free) Ophthalmic Solution 1 Drop(s) Both EYES three times a day PRN dry eyes  glucagon  Injectable 1 milliGRAM(s) IntraMuscular once PRN Glucose LESS THAN 70 milligrams/deciliter  nystatin Powder 1 Application(s) Topical three times a day PRN under breasts      EXAM:  Vital Signs Last 24 Hrs  T(C): 36.7 (29 Jul 2019 06:00), Max: 37.4 (28 Jul 2019 23:59)  T(F): 98 (29 Jul 2019 06:00), Max: 99.3 (28 Jul 2019 23:59)  HR: 105 (29 Jul 2019 06:00) (102 - 112)  BP: 145/87 (29 Jul 2019 06:00) (137/83 - 153/95)  BP(mean): --  RR: 18 (29 Jul 2019 06:00) (18 - 20)  SpO2: 100% (29 Jul 2019 06:00) (96% - 100%)    GENERAL: The patient is awake      LUNGS:anterior lung fields without wheezing, rales or rhonchi; respirations unlabored    HEART: Regular rate and rhythm without murmur.                            8.4    18.89 )-----------( 255      ( 29 Jul 2019 10:11 )             25.1       07-29    128<L>  |  85<L>  |  135<H>  ----------------------------<  138<H>  3.9   |  20<L>  |  3.32<H>    Ca    8.3<L>      29 Jul 2019 06:49          PROBLEM LIST:  69y Female with HEALTH ISSUES - PROBLEM Dx:  Fever: Fever  Type 2 diabetes mellitus without complication, without long-term current use of insulin:   Make sure you get your HgA1c checked every three months.  If you take oral diabetes medications, check your blood glucose two times a day.  If you take insulin, check your blood glucose before meals and at bedtime.  It&#x27;s important not to skip any meals.  Keep a log of your blood glucose results and always take it with you to your doctor appointments.  Keep a list of your current medications including injectables and over the counter medications and bring this medication list with you to all your doctor appointments.  If you have not seen your ophthalmologist this year call for appointment.  Check your feet daily for redness, sores, or openings. Do not self treat. If no improvement in two days call your primary care physician for an appointment.  Low blood sugar (hypoglycemia) is a blood sugar below 70mg/dl. Check your blood sugar if you feel signs/symptoms of hypoglycemia. If your blood sugar is below 70 take 15 grams of carbohydrates (ex 4 oz of apple juice, 3-4 glucose tablets, or 4-6 oz of regular soda) wait 15 minutes and repeat blood sugar to make sure it comes up above 70.  If your blood sugar is above 70 and you are due for a meal, have a meal.  If you are not due for a meal have a snack.  This snack helps keeps your blood sugar at a safe range.    Pressure injury of skin of sacral region, unspecified injury stage: Continue with wound care as instructed.   Maintain adequate nutrition, frequent repositioning , offloading with Zfloat boots.  Follow-up with your primary care physician and Wound Care Specialist within 1 week. Call for appointment.    Chronic deep vein thrombosis (DVT) of right lower extremity, unspecified vein: Take your &quot;blood thinners&quot; as prescribed.  Walking is encouraged, increase activity as tolerated.  If you develop new leg pain, swelling, and/or redness contact your healthcare provider.  If you develop new chest pain with difficulty breathing, a rapid heart rate and/or a feeling of passing out call emergency medical services 911.    Prophylactic measure: Prophylactic measure  Dementia without behavioral disturbance, unspecified dementia type: Continue with medications as prescribed by your doctor.  Maintain safe environment.  Maintain adequate nutrition, hydration.  Follow-up with your primary care physician within 1 week. Call for appointment.    Uncomplicated asthma, unspecified asthma severity, unspecified whether persistent: Stable.  Follow-up with your primary care physician within 1 week. Call for appointment.    Sepsis, due to unspecified organism: Take all antibiotics as ordered.  Call you Health care provider upon arrival home to make a one week follow up appointment.  If you develop fever, chills, malaise, or change in mental status call your Health Care Provider or go to the Emergency Department.  Nutrition is important, eat small frequent meals to help ensure you get adequate calories.  Do not stay in bed all day!  Increase your activity daily as tolerated.    PEG tube malfunction: PEG tube malfunction            RECS:  prednisone taper; prolonged; with goal to stop and continue with home prednisone dose- 7.5mg daily  continue 02  chest pt  nebs standing    Please call with any questions.    Leigh Ann Resendez DO  [facility] Pulmonary/Sleep Medicine  [phone] PULMONARY PROGRESS NOTE    MYRIAM HEATH  MRN-[id number]    Patient is a 69y old  Female who presents with a chief complaint of PEG tube dislodgement, fever, leukocytosis (29 Jul 2019 07:34)      HPI:  on/off ventimask per   for patient comfort    ROS:   -    ACTIVE MEDICATION LIST:  MEDICATIONS  (STANDING):  ALBUTerol/ipratropium for Nebulization 3 milliLiter(s) Nebulizer every 6 hours  apixaban 2.5 milliGRAM(s) Oral two times a day  artificial tears (preservative free) Ophthalmic Solution 1 Drop(s) Both EYES four times a day  ascorbic acid 500 milliGRAM(s) Oral daily  BACItracin   Ointment 1 Application(s) Topical two times a day  chlorhexidine 2% Cloths 1 Application(s) Topical daily  clonazePAM  Tablet 1 milliGRAM(s) Oral at bedtime  Dakins Solution - 1/4 Strength 1 Application(s) Topical two times a day  dextrose 5%. 1000 milliLiter(s) (50 mL/Hr) IV Continuous <Continuous>  famotidine    Tablet 20 milliGRAM(s) Oral daily  ferrous    sulfate Liquid 300 milliGRAM(s) Enteral Tube daily  formoterol for nebulization 20 MICROGram(s) Nebulizer two times a day  gabapentin   Solution 300 milliGRAM(s) Oral every 12 hours  insulin lispro (HumaLOG) corrective regimen sliding scale   SubCutaneous every 6 hours  levothyroxine Injectable 60 MICROGram(s) IV Push at bedtime  Medical Marijuana [place] Oil 2 milliLiter(s),Medical Marijuana [place] Oil 2 milliLiter(s) 2 milliLiter(s) Enteral Tube <User Schedule>  Medical Marijuana Calm Oil 2 milliLiter(s),Medical [place] Calm Oil 2 milliLiter(s) 2 milliLiter(s) Enteral Tube <User Schedule>  Medical Marijuana [place] Oil 2 milliLiter(s),Medical Marijuana [place] Oil 2 milliLiter(s) 2 milliLiter(s) Enteral Tube <User Schedule>  metoprolol tartrate 12.5 milliGRAM(s) Oral two times a day  multivitamin/minerals/iron Oral Solution (CENTRUM) 15 milliLiter(s) Oral daily  QUEtiapine 25 milliGRAM(s) Oral at bedtime  sevelamer carbonate Powder 800 milliGRAM(s) Oral three times a day with meals  tiZANidine 4 milliGRAM(s) Oral <User Schedule>  zinc sulfate 220 milliGRAM(s) Oral daily    MEDICATIONS  (PRN):  acetaminophen  Suppository .. 650 milliGRAM(s) Rectal every 6 hours PRN Temp greater or equal to 38C (100.4F), Mild Pain (1 - 3)  carboxymethylcellulose 0.5% (Preservative-Free) Ophthalmic Solution 1 Drop(s) Both EYES three times a day PRN dry eyes  glucagon  Injectable 1 milliGRAM(s) IntraMuscular once PRN Glucose LESS THAN 70 milligrams/deciliter  nystatin Powder 1 Application(s) Topical three times a day PRN under breasts      EXAM:  Vital Signs Last 24 Hrs  T(C): 36.7 (29 Jul 2019 06:00), Max: 37.4 (28 Jul 2019 23:59)  T(F): 98 (29 Jul 2019 06:00), Max: 99.3 (28 Jul 2019 23:59)  HR: 105 (29 Jul 2019 06:00) (102 - 112)  BP: 145/87 (29 Jul 2019 06:00) (137/83 - 153/95)  BP(mean): --  RR: 18 (29 Jul 2019 06:00) (18 - 20)  SpO2: 100% (29 Jul 2019 06:00) (96% - 100%)    GENERAL: The patient is awake      LUNGS:anterior lung fields without wheezing, rales or rhonchi; respirations unlabored    HEART: Regular rate and rhythm without murmur.                            8.4    18.89 )-----------( 255      ( 29 Jul 2019 10:11 )             25.1       07-29    128<L>  |  85<L>  |  135<H>  ----------------------------<  138<H>  3.9   |  20<L>  |  3.32<H>    Ca    8.3<L>      29 Jul 2019 06:49          PROBLEM LIST:  69y Female with HEALTH ISSUES - PROBLEM Dx:  Fever: Fever  Type 2 diabetes mellitus without complication, without long-term current use of insulin:   Make sure you get your HgA1c checked every three months.  If you take oral diabetes medications, check your blood glucose two times a day.  If you take insulin, check your blood glucose before meals and at bedtime.  It&#x27;s important not to skip any meals.  Keep a log of your blood glucose results and always take it with you to your doctor appointments.  Keep a list of your current medications including injectables and over the counter medications and bring this medication list with you to all your doctor appointments.  If you have not seen your ophthalmologist this year call for appointment.  Check your feet daily for redness, sores, or openings. Do not self treat. If no improvement in two days call your primary care physician for an appointment.  Low blood sugar (hypoglycemia) is a blood sugar below 70mg/dl. Check your blood sugar if you feel signs/symptoms of hypoglycemia. If your blood sugar is below 70 take 15 grams of carbohydrates (ex 4 oz of apple juice, 3-4 glucose tablets, or 4-6 oz of regular soda) wait 15 minutes and repeat blood sugar to make sure it comes up above 70.  If your blood sugar is above 70 and you are due for a meal, have a meal.  If you are not due for a meal have a snack.  This snack helps keeps your blood sugar at a safe range.    Pressure injury of skin of sacral region, unspecified injury stage: Continue with wound care as instructed.   Maintain adequate nutrition, frequent repositioning , offloading with Zfloat boots.  Follow-up with your primary care physician and Wound Care Specialist within 1 week. Call for appointment.    Chronic deep vein thrombosis (DVT) of right lower extremity, unspecified vein: Take your &quot;blood thinners&quot; as prescribed.  Walking is encouraged, increase activity as tolerated.  If you develop new leg pain, swelling, and/or redness contact your healthcare provider.  If you develop new chest pain with difficulty breathing, a rapid heart rate and/or a feeling of passing out call emergency medical services 911.    Prophylactic measure: Prophylactic measure  Dementia without behavioral disturbance, unspecified dementia type: Continue with medications as prescribed by your doctor.  Maintain safe environment.  Maintain adequate nutrition, hydration.  Follow-up with your primary care physician within 1 week. Call for appointment.    Uncomplicated asthma, unspecified asthma severity, unspecified whether persistent: Stable.  Follow-up with your primary care physician within 1 week. Call for appointment.    Sepsis, due to unspecified organism: Take all antibiotics as ordered.  Call you Health care provider upon arrival home to make a one week follow up appointment.  If you develop fever, chills, malaise, or change in mental status call your Health Care Provider or go to the Emergency Department.  Nutrition is important, eat small frequent meals to help ensure you get adequate calories.  Do not stay in bed all day!  Increase your activity daily as tolerated.    PEG tube malfunction: PEG tube malfunction            RECS:  prednisone taper; prolonged; with goal to stop and continue with home prednisone dose- 7.5mg daily  continue 02  chest pt  nebs standing    Please call with any questions.    Leigh Ann Resendez DO  [facility] Pulmonary/Sleep Medicine  [phone]

## 2019-07-29 NOTE — CONSULT NOTE ADULT - CONSULT REQUESTED DATE/TIME
25-Jun-2019
03-Jul-2019
07-Jul-2019 14:13
08-Jul-2019 06:00
08-Jul-2019 11:18
24-Jun-2019 15:57
29-Jul-2019 10:00
24-Jun-2019

## 2019-07-29 NOTE — PROGRESS NOTE ADULT - SUBJECTIVE AND OBJECTIVE BOX
infectious diseases progress note:    Patient is a 69y old  Female who presents with a chief complaint of PEG tube dislodgement, fever, leukocytosis (28 Jul 2019 14:49)        Gastrostomy malfunction           Allergies    ASA; dye contrast (Anaphylaxis)  aspirin (Short breath)  divalproex sodium (Other (Unknown))  Flowers and Plants (Short breath)  Haldol (Other (Unknown))  penicillin (Short breath; Rash)  sulfa drugs (Short breath; Rash)  vancomycin (Rash; Urticaria; Hives)  Xanax (Other (Unknown))    Intolerances        ANTIBIOTICS/RELEVANT:  antimicrobials    immunologic:    OTHER:  acetaminophen  Suppository .. 650 milliGRAM(s) Rectal every 6 hours PRN  ALBUTerol/ipratropium for Nebulization 3 milliLiter(s) Nebulizer every 6 hours  apixaban 2.5 milliGRAM(s) Oral two times a day  artificial tears (preservative free) Ophthalmic Solution 1 Drop(s) Both EYES four times a day  ascorbic acid 500 milliGRAM(s) Oral daily  BACItracin   Ointment 1 Application(s) Topical two times a day  carboxymethylcellulose 0.5% (Preservative-Free) Ophthalmic Solution 1 Drop(s) Both EYES three times a day PRN  chlorhexidine 2% Cloths 1 Application(s) Topical daily  Dakins Solution - 1/4 Strength 1 Application(s) Topical two times a day  dextrose 5%. 1000 milliLiter(s) IV Continuous <Continuous>  famotidine    Tablet 20 milliGRAM(s) Oral daily  ferrous    sulfate Liquid 300 milliGRAM(s) Enteral Tube daily  formoterol for nebulization 20 MICROGram(s) Nebulizer two times a day  gabapentin   Solution 300 milliGRAM(s) Oral every 12 hours  glucagon  Injectable 1 milliGRAM(s) IntraMuscular once PRN  insulin lispro (HumaLOG) corrective regimen sliding scale   SubCutaneous every 6 hours  levothyroxine Injectable 60 MICROGram(s) IV Push at bedtime  Medical Marijuana [place]ake Oil 2 milliLiter(s),Medical Marijuana Awake Oil 2 milliLiter(s) 2 milliLiter(s) Enteral Tube <User Schedule>  Medical Marijuana Calm Oil 2 milliLiter(s),Medical [place]ju[place] Calm Oil 2 milliLiter(s) 2 milliLiter(s) Enteral Tube <User Schedule>  Medical Marijuana [place] Oil 2 milliLiter(s),Medical Marijuana [place] Oil 2 milliLiter(s) 2 milliLiter(s) Enteral Tube <User Schedule>  metoprolol tartrate 12.5 milliGRAM(s) Oral two times a day  multivitamin/minerals/iron Oral Solution (CENTRUM) 15 milliLiter(s) Oral daily  nystatin Powder 1 Application(s) Topical three times a day PRN  QUEtiapine 25 milliGRAM(s) Oral at bedtime  sevelamer carbonate Powder 800 milliGRAM(s) Oral three times a day with meals  sodium chloride 0.9%. 1000 milliLiter(s) IV Continuous <Continuous>  tiZANidine 4 milliGRAM(s) Oral <User Schedule>  zinc sulfate 220 milliGRAM(s) Oral daily      Objective:  Vital Signs Last 24 Hrs  T(C): 36.7 (29 Jul 2019 06:00), Max: 37.4 (28 Jul 2019 23:59)  T(F): 98 (29 Jul 2019 06:00), Max: 99.3 (28 Jul 2019 23:59)  HR: 105 (29 Jul 2019 06:00) (102 - 112)  BP: 145/87 (29 Jul 2019 06:00) (132/82 - 153/95)  BP(mean): --  RR: 18 (29 Jul 2019 06:00) (18 - 20)  SpO2: 100% (29 Jul 2019 06:00) (96% - 100%)       LABS:                        8.1    18.43 )-----------( 239      ( 28 Jul 2019 10:03 )             24.9     07-28    130<L>  |  90<L>  |  131<H>  ----------------------------<  121<H>  3.7   |  19<L>  |  3.26<H>    Ca    8.3<L>      28 Jul 2019 07:24              MICROBIOLOGY:    RECENT CULTURES:        RESPIRATORY CULTURES:              RADIOLOGY & ADDITIONAL STUDIES:        Pager [phone]  After 5 pm/weekends or if no response :[phone]

## 2019-07-29 NOTE — PROGRESS NOTE ADULT - SUBJECTIVE AND OBJECTIVE BOX
Patient is a 69y old  Female who presented with a chief complaint of PEG tube dislodgement, fever, leukocytosis (29 Jul 2019 12:41)      INTERVAL HPI/OVERNIGHT EVENTS:  tolerating G tube feeds  some irritation per spouse at G tube site  no leakage of feeds    MEDICATIONS  (STANDING):  ALBUTerol/ipratropium for Nebulization 3 milliLiter(s) Nebulizer every 6 hours  apixaban 2.5 milliGRAM(s) Oral two times a day  artificial tears (preservative free) Ophthalmic Solution 1 Drop(s) Both EYES four times a day  ascorbic acid 500 milliGRAM(s) Oral daily  BACItracin   Ointment 1 Application(s) Topical two times a day  chlorhexidine 2% Cloths 1 Application(s) Topical daily  clonazePAM  Tablet 1 milliGRAM(s) Oral at bedtime  Dakins Solution - 1/4 Strength 1 Application(s) Topical two times a day  dextrose 5%. 1000 milliLiter(s) (50 mL/Hr) IV Continuous <Continuous>  famotidine    Tablet 20 milliGRAM(s) Oral daily  ferrous    sulfate Liquid 300 milliGRAM(s) Enteral Tube daily  formoterol for nebulization 20 MICROGram(s) Nebulizer two times a day  gabapentin   Solution 300 milliGRAM(s) Oral every 12 hours  insulin lispro (HumaLOG) corrective regimen sliding scale   SubCutaneous every 6 hours  levothyroxine Injectable 60 MICROGram(s) IV Push at bedtime  Medical Marijuana [place] Oil 2 milliLiter(s),Medical Marijuana [place]ake Oil 2 milliLiter(s) 2 milliLiter(s) Enteral Tube <User Schedule>  Medical Marijuana Calm Oil 2 milliLiter(s),Medical [place] Calm Oil 2 milliLiter(s) 2 milliLiter(s) Enteral Tube <User Schedule>  Medical Marijuana [place] Oil 2 milliLiter(s),Medical Marijuana [place] Oil 2 milliLiter(s) 2 milliLiter(s) Enteral Tube <User Schedule>  metoprolol tartrate 12.5 milliGRAM(s) Oral two times a day  multivitamin/minerals/iron Oral Solution (CENTRUM) 15 milliLiter(s) Oral daily  QUEtiapine 25 milliGRAM(s) Oral at bedtime  sevelamer carbonate Powder 800 milliGRAM(s) Oral three times a day with meals  tiZANidine 4 milliGRAM(s) Oral <User Schedule>  zinc sulfate 220 milliGRAM(s) Oral daily    MEDICATIONS  (PRN):  acetaminophen  Suppository .. 650 milliGRAM(s) Rectal every 6 hours PRN Temp greater or equal to 38C (100.4F), Mild Pain (1 - 3)  carboxymethylcellulose 0.5% (Preservative-Free) Ophthalmic Solution 1 Drop(s) Both EYES three times a day PRN dry eyes  glucagon  Injectable 1 milliGRAM(s) IntraMuscular once PRN Glucose LESS THAN 70 milligrams/deciliter  nystatin Powder 1 Application(s) Topical three times a day PRN under breasts      Allergies  ASA; dye contrast (Anaphylaxis)  aspirin (Short breath)  divalproex sodium (Other (Unknown))  Flowers and Plants (Short breath)  Haldol (Other (Unknown))  penicillin (Short breath; Rash)  sulfa drugs (Short breath; Rash)  vancomycin (Rash; Urticaria; Hives)  Xanax (Other (Unknown))      Review of Systems:  unable to obtain    Vital Signs Last 24 Hrs  T(C): 36.5 (29 Jul 2019 12:00), Max: 37.4 (28 Jul 2019 23:59)  T(F): 97.7 (29 Jul 2019 12:00), Max: 99.3 (28 Jul 2019 23:59)  HR: 101 (29 Jul 2019 12:00) (101 - 112)  BP: 137/87 (29 Jul 2019 12:00) (129/80 - 153/95)  BP(mean): --  RR: 17 (29 Jul 2019 12:00) (17 - 20)  SpO2: 100% (29 Jul 2019 12:00) (96% - 100%)    PHYSICAL EXAM:  Constitutional: frail elderly chronically ill appearing +severe contractures, non verbal  eyes open spouse at bedside +anasarca and muscle wasting  Neck: No JVD  Respiratory: + rhonchi , decreased BS bases  Cardiovascular: S1 and S2 regular  Gastrointestinal: BS+, soft, +G tube  high in epigastric area  bumper noted to be at 4-6  cm richard. no leakage, site clean.  bumper brought down to 2cm richard and skin prep with Cavilon for barrier.  silk tape applied above bumper to help prevent bumper from sliding up tubing  Extremities: anasarca/+edema and muscle wasting +dressings in place ++contractures  Vascular: 2+ peripheral pulses  Neurological: lethargic, no focal asymmetry  Psychiatric: non verbal   Skin: anicteric  +skin dressings and heel pad cushions in place  multiple decubiti (wound care following)    LABS:                        8.4    18.89 )-----------( 255      ( 29 Jul 2019 10:11 )             25.1     07-29    128<L>  |  85<L>  |  135<H>  ----------------------------<  138<H>  3.9   |  20<L>  |  3.32<H>    Ca    8.3<L>      29 Jul 2019 06:49              RADIOLOGY & ADDITIONAL TESTS:

## 2019-07-29 NOTE — PROGRESS NOTE ADULT - ASSESSMENT
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, left knee fracture, DM, RLE DVT, p/w dislodged PEG tube s/p replacement of PEG by IR c/b TRANG   - TRANG: multifactorial - non-oliguric (475 mL/24 hr) - azotemia rising - BUN also elevated due to steroids  - metabolic acidosis with high anion gap: from TRANG + sepsis  - hyponatremia: due to hypervolemia and TRANG  - hypervolemic on exam with LE edema and JVD  - anemia: of chronic dz - hgb improving    RECOMMEND:  - continue Nepro TF   - continue Renvela powder 800 mg TID via PEG  - consider checking lactate level   - maintain chronic gavin catheter   - monitor I/O   - dose medication for a GFR ~ 10-15 mL/min  - second nephrology opinion   - palliative care for determination of goals of care     Laura Farr, NP  [facility]  [phone]

## 2019-07-29 NOTE — PROGRESS NOTE ADULT - ASSESSMENT
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 Since admission, went to IR to have PEG replaced (6/25/19)    s/p RRT on 7/13 for hypotension in the SBP 50-60s and hypoxia in the 80s, admitted to MICU for shock requiring IV pressor support. Now stable off pressors, on NC. On stress steroids dosing  DVT on AC  s/p RRT for hypotension 7/21- 7/22 overnight  Anemia - without overt/brisk GI bleed.     RECS:  -keep G tube bumper at ~2-3 cm richard on G tube.  -G tube feeds as ordered.   -Renal following  -wound care  -ID following  -monitor BMs  -Renal following  -Continue Pepcid  -Monitor BMs  -epogen as per Renal      Adam Miller PA-C    [facility]  [phone]  After hours and weekend coverage [phone]

## 2019-07-29 NOTE — CONSULT NOTE ADULT - SUBJECTIVE AND OBJECTIVE BOX
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  Hospital course complicated by pneumonia, anemia (s/p 1U PRBC), TRANG d/t ATN yuridia, RRT on 7/13 for hypoxic (80%) & hypotension sBP 50-60 (?held home dose prednisone)admit to MICU for shock requiring vasopressor, now with worsening kidney function - housestaff nephrology consulted for second opinion on TRANG.    On admission, patient's SCr 0.42 (on 06/24, baseline 0.4), SCr trended up since July 4th 0.65 to 1.12), SCr peaked 3.45 (on July 22), currently 3.32.  - renal U/S 7/7/19 showed R kidney size 11.3 cm and L kidney size 6.7 cm (limited exam d/t positioning), no hydro or mass.  - vanco trough 23.2(on 7/3) and 33.6 (on 7/7)  - BP fluctuates throughout hospitalization including before initial uptrend SCr 7/3-7/4.  No IV contrast during this time.    PAST HISTORY  --------------------------------------------------------------------------------  PAST MEDICAL & SURGICAL HISTORY:  Type 2 diabetes mellitus  Dementia  DVT, lower extremity  CVA (cerebral vascular accident)  Fatty pancreas  PNA (pneumonia)  Pulmonary HTN  IGT (impaired glucose tolerance)  Ulcerative colitis  Acid reflux  Anxiety  Depression  Mouth sores  HLD (hyperlipidemia)  Asthma  S/P percutaneous endoscopic gastrostomy (PEG) tube placement: for dysphagia  Humeral head fracture  H/O: hysterectomy  H/O cataract extraction, left  History of knee replacement    FAMILY HISTORY:  Family history of dementia (Grandparent)  Family history of colon cancer (Grandparent)    PAST SOCIAL HISTORY:    ALLERGIES & MEDICATIONS  --------------------------------------------------------------------------------  Allergies    ASA; dye contrast (Anaphylaxis)  aspirin (Short breath)  divalproex sodium (Other (Unknown))  Flowers and Plants (Short breath)  Haldol (Other (Unknown))  penicillin (Short breath; Rash)  sulfa drugs (Short breath; Rash)  vancomycin (Rash; Urticaria; Hives)  Xanax (Other (Unknown))    Intolerances      Standing Inpatient Medications  ALBUTerol/ipratropium for Nebulization 3 milliLiter(s) Nebulizer every 6 hours  apixaban 2.5 milliGRAM(s) Oral two times a day  artificial tears (preservative free) Ophthalmic Solution 1 Drop(s) Both EYES four times a day  ascorbic acid 500 milliGRAM(s) Oral daily  BACItracin   Ointment 1 Application(s) Topical two times a day  chlorhexidine 2% Cloths 1 Application(s) Topical daily  clonazePAM  Tablet 1 milliGRAM(s) Oral at bedtime  Dakins Solution - 1/4 Strength 1 Application(s) Topical two times a day  dextrose 5%. 1000 milliLiter(s) IV Continuous <Continuous>  famotidine    Tablet 20 milliGRAM(s) Oral daily  ferrous    sulfate Liquid 300 milliGRAM(s) Enteral Tube daily  formoterol for nebulization 20 MICROGram(s) Nebulizer two times a day  gabapentin   Solution 300 milliGRAM(s) Oral every 12 hours  insulin lispro (HumaLOG) corrective regimen sliding scale   SubCutaneous every 6 hours  levothyroxine Injectable 60 MICROGram(s) IV Push at bedtime  Medical Marijuana Awake Oil 2 milliLiter(s),Medical Marijuana Awake Oil 2 milliLiter(s) 2 milliLiter(s) Enteral Tube <User Schedule>  Medical Marijuana Calm Oil 2 milliLiter(s),Medical Marinjuana Calm Oil 2 milliLiter(s) 2 milliLiter(s) Enteral Tube <User Schedule>  Medical Marijuana Clayton Oil 2 milliLiter(s),Medical Marijuana Clayton Oil 2 milliLiter(s) 2 milliLiter(s) Enteral Tube <User Schedule>  metoprolol tartrate 12.5 milliGRAM(s) Oral two times a day  multivitamin/minerals/iron Oral Solution (CENTRUM) 15 milliLiter(s) Oral daily  QUEtiapine 25 milliGRAM(s) Oral at bedtime  sevelamer carbonate Powder 800 milliGRAM(s) Oral three times a day with meals  tiZANidine 4 milliGRAM(s) Oral <User Schedule>  zinc sulfate 220 milliGRAM(s) Oral daily    PRN Inpatient Medications  acetaminophen  Suppository .. 650 milliGRAM(s) Rectal every 6 hours PRN  carboxymethylcellulose 0.5% (Preservative-Free) Ophthalmic Solution 1 Drop(s) Both EYES three times a day PRN  glucagon  Injectable 1 milliGRAM(s) IntraMuscular once PRN  nystatin Powder 1 Application(s) Topical three times a day PRN      REVIEW OF SYSTEMS  - unable to obtain nonverbal at baseline    VITALS/PHYSICAL EXAM  --------------------------------------------------------------------------------  T(C): 36.7 (07-29-19 @ 06:00), Max: 37.4 (07-28-19 @ 23:59)  HR: 105 (07-29-19 @ 06:00) (102 - 112)  BP: 145/87 (07-29-19 @ 06:00) (137/83 - 153/95)  RR: 18 (07-29-19 @ 06:00) (18 - 20)  SpO2: 100% (07-29-19 @ 06:00) (96% - 100%)  Wt(kg): --        07-28-19 @ 07:01  -  07-29-19 @ 07:00  --------------------------------------------------------  IN: 1600 mL / OUT: 475 mL / NET: 1125 mL      Physical Exam:  	Gen: NAD, non-verbal  	HEENT: +JVD  	Pulm: CTA B/L on anteriorly auscultated   	CV: RRR, S1S2  	Abd: +BS, soft, nontender/nondistended  	LE: contracted legs with pressure injury, no edema  	Skin: L foot cold to touch, R is warm    LABS/STUDIES  --------------------------------------------------------------------------------              8.4    18.89 >-----------<  255      [07-29-19 @ 10:11]              25.1     128  |  85  |  135  ----------------------------<  138      [07-29-19 @ 06:49]  3.9   |  20  |  3.32        Ca     8.3     [07-29-19 @ 06:49]      Creatinine Trend:  SCr 3.32 [07-29 @ 06:49]  SCr 3.26 [07-28 @ 07:24]  SCr 3.23 [07-27 @ 06:28]  SCr 3.27 [07-26 @ 05:49]  SCr 3.04 [07-25 @ 06:47]    Urinalysis - [07-13-19 @ 05:37]      Color Yellow / Appearance Slightly Turbid / SG 1.016 / pH 6.0      Gluc Negative / Ketone Negative  / Bili Negative / Urobili Negative       Blood Large / Protein 300 mg/dL / Leuk Est Large / Nitrite Negative      RBC 10-25 / WBC >50 / Hyaline  / Gran  / Sq Epi  / Non Sq Epi 0 / Bacteria Moderate      Iron 34, TIBC 222, %sat 15      [05-03-19 @ 09:30]  Ferritin 532      [05-03-19 @ 10:13]  PTH -- (Ca 8.6)      [09-11-18 @ 09:14]   52  Vitamin D (25OH) 45.1      [03-25-19 @ 10:18]  HbA1c 5.0      [06-14-19 @ 09:16]  TSH 9.23      [07-13-19 @ 10:42]    HCV 0.04, Nonreact      [03-25-19 @ 08:54]    PIOTR: titer 1:320, pattern Centromere      [12-24-18 @ 14:47]  dsDNA <12      [01-11-19 @ 10:07]  C3 Complement 82      [07-31-18 @ 05:49]  C4 Complement 29      [07-31-18 @ 05:49]  ANCA: cANCA Negative, pANCA Negative, atypical ANCA Negative      [01-08-19 @ 08:00]  MPO-ANCA <5.0, interpretation: Negative      [01-08-19 @ 08:00]  PR3-ANCA <5.0, interpretation: Negative      [01-08-19 @ 08:00]  Syphilis Screen (Treponema Pallidum Ab) Negative      [03-08-18 @ 07:51]  Free Light Chains: kappa 0.84, lambda 1.18, ratio = 0.71      [07-31 @ 05:49]  Immunofixation Serum:   No Monoclonal Band Identified      [07-31-18 @ 05:49] [facility] DIVISION OF KIDNEY DISEASES AND HYPERTENSION -- INITIAL CONSULT NOTE  --------------------------------------------------------------------------------  HPI:  69F with prolong hospitalization PMHx Advanced dementia (nonverbal, dysphagia, with PEG tube, functional quadriplegic, chronic Blackman catheter), sacral state 4 pressure ulcer, heel pressure ulcers, asthma on chronic prednisone, HLD, CVA, HFrEF, 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  Hospital course complicated by pneumonia, anemia (s/p 1U PRBC), TRANG d/t ATN yuridia, RRT on 7/13 for hypoxic (80%) & hypotension sBP 50-60 (?held home dose prednisone)admit to MICU for shock requiring vasopressor, now with worsening kidney function - House Nephrology consulted for second opinion on TRANG.    On admission, patient's SCr 0.42 (on 06/24, baseline 0.4), SCr trended up since July 4th 0.65 to 1.12), SCr peaked 3.45 (on July 22), currently 3.32.  - renal U/S 7/7/19 showed R kidney size 11.3 cm and L kidney size 6.7 cm (limited exam d/t positioning), no hydro or mass.  - vanco trough 23.2(on 7/3) and 33.6 (on 7/7)  - BP fluctuates throughout hospitalization including before initial uptrend SCr 7/3-7/4.  No IV contrast during this time.    PAST HISTORY  --------------------------------------------------------------------------------  PAST MEDICAL & SURGICAL HISTORY:  Type 2 diabetes mellitus  Dementia  DVT, lower extremity  CVA (cerebral vascular accident)  Fatty pancreas  PNA (pneumonia)  Pulmonary HTN  IGT (impaired glucose tolerance)  Ulcerative colitis  Acid reflux  Anxiety  Depression  Mouth sores  HLD (hyperlipidemia)  Asthma  S/P percutaneous endoscopic gastrostomy (PEG) tube placement: for dysphagia  Humeral head fracture  H/O: hysterectomy  H/O cataract extraction, left  History of knee replacement    FAMILY HISTORY:  Family history of dementia (Grandparent)  Family history of colon cancer (Grandparent)    PAST SOCIAL HISTORY:    ALLERGIES & MEDICATIONS  --------------------------------------------------------------------------------  Allergies    ASA; dye contrast (Anaphylaxis)  aspirin (Short breath)  divalproex sodium (Other (Unknown))  Flowers and Plants (Short breath)  Haldol (Other (Unknown))  penicillin (Short breath; Rash)  sulfa drugs (Short breath; Rash)  vancomycin (Rash; Urticaria; Hives)  Xanax (Other (Unknown))    Intolerances      Standing Inpatient Medications  ALBUTerol/ipratropium for Nebulization 3 milliLiter(s) Nebulizer every 6 hours  apixaban 2.5 milliGRAM(s) Oral two times a day  artificial tears (preservative free) Ophthalmic Solution 1 Drop(s) Both EYES four times a day  ascorbic acid 500 milliGRAM(s) Oral daily  BACItracin   Ointment 1 Application(s) Topical two times a day  chlorhexidine 2% Cloths 1 Application(s) Topical daily  clonazePAM  Tablet 1 milliGRAM(s) Oral at bedtime  Dakins Solution - 1/4 Strength 1 Application(s) Topical two times a day  dextrose 5%. 1000 milliLiter(s) IV Continuous <Continuous>  famotidine    Tablet 20 milliGRAM(s) Oral daily  ferrous    sulfate Liquid 300 milliGRAM(s) Enteral Tube daily  formoterol for nebulization 20 MICROGram(s) Nebulizer two times a day  gabapentin   Solution 300 milliGRAM(s) Oral every 12 hours  insulin lispro (HumaLOG) corrective regimen sliding scale   SubCutaneous every 6 hours  levothyroxine Injectable 60 MICROGram(s) IV Push at bedtime  Medical Marijuana Awake Oil 2 milliLiter(s),Medical Marijuana Awake Oil 2 milliLiter(s) 2 milliLiter(s) Enteral Tube <User Schedule>  Medical Marijuana Calm Oil 2 milliLiter(s),Medical Marinjuana Calm Oil 2 milliLiter(s) 2 milliLiter(s) Enteral Tube <User Schedule>  Medical Marijuana Wallace Oil 2 milliLiter(s),Medical Marijuana Wallace Oil 2 milliLiter(s) 2 milliLiter(s) Enteral Tube <User Schedule>  metoprolol tartrate 12.5 milliGRAM(s) Oral two times a day  multivitamin/minerals/iron Oral Solution (CENTRUM) 15 milliLiter(s) Oral daily  QUEtiapine 25 milliGRAM(s) Oral at bedtime  sevelamer carbonate Powder 800 milliGRAM(s) Oral three times a day with meals  tiZANidine 4 milliGRAM(s) Oral <User Schedule>  zinc sulfate 220 milliGRAM(s) Oral daily    PRN Inpatient Medications  acetaminophen  Suppository .. 650 milliGRAM(s) Rectal every 6 hours PRN  carboxymethylcellulose 0.5% (Preservative-Free) Ophthalmic Solution 1 Drop(s) Both EYES three times a day PRN  glucagon  Injectable 1 milliGRAM(s) IntraMuscular once PRN  nystatin Powder 1 Application(s) Topical three times a day PRN      REVIEW OF SYSTEMS  - unable to obtain nonverbal at baseline    VITALS/PHYSICAL EXAM  --------------------------------------------------------------------------------  T(C): 36.7 (07-29-19 @ 06:00), Max: 37.4 (07-28-19 @ 23:59)  HR: 105 (07-29-19 @ 06:00) (102 - 112)  BP: 145/87 (07-29-19 @ 06:00) (137/83 - 153/95)  RR: 18 (07-29-19 @ 06:00) (18 - 20)  SpO2: 100% (07-29-19 @ 06:00) (96% - 100%)  Wt(kg): --        07-28-19 @ 07:01  -  07-29-19 @ 07:00  --------------------------------------------------------  IN: 1600 mL / OUT: 475 mL / NET: 1125 mL      Physical Exam:  	Gen: NAD, non-verbal  	HEENT: +JVD  	Pulm: CTA B/L on anteriorly auscultated   	CV: RRR, S1S2  	Abd: +BS, soft, nontender/nondistended  	LE: contracted legs with pressure injury, no edema  	Skin: L foot cold to touch, R is warm    LABS/STUDIES  --------------------------------------------------------------------------------              8.4    18.89 >-----------<  255      [07-29-19 @ 10:11]              25.1     128  |  85  |  135  ----------------------------<  138      [07-29-19 @ 06:49]  3.9   |  20  |  3.32        Ca     8.3     [07-29-19 @ 06:49]      Creatinine Trend:  SCr 3.32 [07-29 @ 06:49]  SCr 3.26 [07-28 @ 07:24]  SCr 3.23 [07-27 @ 06:28]  SCr 3.27 [07-26 @ 05:49]  SCr 3.04 [07-25 @ 06:47]    Urinalysis - [07-13-19 @ 05:37]      Color Yellow / Appearance Slightly Turbid / SG 1.016 / pH 6.0      Gluc Negative / Ketone Negative  / Bili Negative / Urobili Negative       Blood Large / Protein 300 mg/dL / Leuk Est Large / Nitrite Negative      RBC 10-25 / WBC >50 / Hyaline  / Gran  / Sq Epi  / Non Sq Epi 0 / Bacteria Moderate      Iron 34, TIBC 222, %sat 15      [05-03-19 @ 09:30]  Ferritin 532      [05-03-19 @ 10:13]  PTH -- (Ca 8.6)      [09-11-18 @ 09:14]   52  Vitamin D (25OH) 45.1      [03-25-19 @ 10:18]  HbA1c 5.0      [06-14-19 @ 09:16]  TSH 9.23      [07-13-19 @ 10:42]    HCV 0.04, Nonreact      [03-25-19 @ 08:54]    PIOTR: titer 1:320, pattern Centromere      [12-24-18 @ 14:47]  dsDNA <12      [01-11-19 @ 10:07]  C3 Complement 82      [07-31-18 @ 05:49]  C4 Complement 29      [07-31-18 @ 05:49]  ANCA: cANCA Negative, pANCA Negative, atypical ANCA Negative      [01-08-19 @ 08:00]  MPO-ANCA <5.0, interpretation: Negative      [01-08-19 @ 08:00]  PR3-ANCA <5.0, interpretation: Negative      [01-08-19 @ 08:00]  Syphilis Screen (Treponema Pallidum Ab) Negative      [03-08-18 @ 07:51]  Free Light Chains: kappa 0.84, lambda 1.18, ratio = 0.71      [07-31 @ 05:49]  Immunofixation Serum:   No Monoclonal Band Identified      [07-31-18 @ 05:49]

## 2019-07-29 NOTE — CONSULT NOTE ADULT - ASSESSMENT
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  Hospital course complicated by RRT on 7/13 for hypoxic (80%) and hypotension (sBP 50-60) admit to MICU for shock requiring vasopressor, now with worsening kidney function - housestaff nephrology consulted for second opinion on TRANG.     - On admission, patient's SCr 0.42 (on 06/24, baseline 0.4), SCr trended up since July 4th 0.65 to 1.12), SCr peaked 3.45 (on July 22), currently 3.32.  - renal U/S 7/7/19 showed R kidney size 11.3 cm and L kidney size 6.7 cm (limited exam d/t positioning), no hydro or mass.  - vanco trough 23.2(on 7/3) and 33.6 (on 7/7) -->switch merram  - BP fluctuates throughout hospitalization including before initial uptrend SCr 7/3-7/4.  No IV contrast during this time.

## 2019-07-29 NOTE — PROGRESS NOTE ADULT - SUBJECTIVE AND OBJECTIVE BOX
---___---___---___---___---___---___ ---___---___---___---___---___---___---___---___---                  M E D I C A L   A T T E N D I N G   P R O G R E S S   N O T E  ---___---___---___---___---___---___ ---___---___---___---___---___---___---___---___---        ================================================    ++CHIEF COMPLAINT:   Patient is a 69y old  Female who presents with a chief complaint of PEG tube dislodgement, fever, leukocytosis (2019 07:06)      libia not improving     ---___---___---___---___---___---  PAST MEDICAL / Surgical  HISTORY:  PAST MEDICAL & SURGICAL HISTORY:  Type 2 diabetes mellitus  Dementia  DVT, lower extremity  CVA (cerebral vascular accident)  Fatty pancreas  PNA (pneumonia)  Pulmonary HTN  IGT (impaired glucose tolerance)  Ulcerative colitis  Acid reflux  Anxiety  Depression  Mouth sores  HLD (hyperlipidemia)  Asthma  S/P percutaneous endoscopic gastrostomy (PEG) tube placement: for dysphagia  Humeral head fracture  H/O: hysterectomy  H/O cataract extraction, left  History of knee replacement      ---___---___---___---___---___---  FAMILY HISTORY:   FAMILY HISTORY:  Family history of dementia (Grandparent)  Family history of colon cancer (Grandparent)        ---___---___---___---___---___---  ALLERGIES:   Allergies    ASA; dye contrast (Anaphylaxis)  aspirin (Short breath)  divalproex sodium (Other (Unknown))  Flowers and Plants (Short breath)  Haldol (Other (Unknown))  penicillin (Short breath; Rash)  sulfa drugs (Short breath; Rash)  vancomycin (Rash; Urticaria; Hives)  Xanax (Other (Unknown))    Intolerances        ---___---___---___---___---___---  MEDICATIONS:  MEDICATIONS  (STANDING):  ALBUTerol/ipratropium for Nebulization 3 milliLiter(s) Nebulizer every 6 hours  apixaban 2.5 milliGRAM(s) Oral two times a day  artificial tears (preservative free) Ophthalmic Solution 1 Drop(s) Both EYES four times a day  ascorbic acid 500 milliGRAM(s) Oral daily  BACItracin   Ointment 1 Application(s) Topical two times a day  chlorhexidine 2% Cloths 1 Application(s) Topical daily  Dakins Solution - 1/4 Strength 1 Application(s) Topical two times a day  dextrose 5%. 1000 milliLiter(s) (50 mL/Hr) IV Continuous <Continuous>  famotidine    Tablet 20 milliGRAM(s) Oral daily  ferrous    sulfate Liquid 300 milliGRAM(s) Enteral Tube daily  formoterol for nebulization 20 MICROGram(s) Nebulizer two times a day  gabapentin   Solution 300 milliGRAM(s) Oral every 12 hours  insulin lispro (HumaLOG) corrective regimen sliding scale   SubCutaneous every 6 hours  levothyroxine Injectable 60 MICROGram(s) IV Push at bedtime  Medical Marijuana Awake Oil 2 milliLiter(s),Medical Marijuana Awake Oil 2 milliLiter(s) 2 milliLiter(s) Enteral Tube <User Schedule>  Medical Marijuana Calm Oil 2 milliLiter(s),Medical Marinjuana Calm Oil 2 milliLiter(s) 2 milliLiter(s) Enteral Tube <User Schedule>  Medical Marijuana Doss Oil 2 milliLiter(s),Medical Marijuana Doss Oil 2 milliLiter(s) 2 milliLiter(s) Enteral Tube <User Schedule>  metoprolol tartrate 12.5 milliGRAM(s) Oral two times a day  multivitamin/minerals/iron Oral Solution (CENTRUM) 15 milliLiter(s) Oral daily  QUEtiapine 25 milliGRAM(s) Oral at bedtime  sevelamer carbonate Powder 800 milliGRAM(s) Oral three times a day with meals  sodium chloride 0.9%. 1000 milliLiter(s) (75 mL/Hr) IV Continuous <Continuous>  tiZANidine 4 milliGRAM(s) Oral <User Schedule>  zinc sulfate 220 milliGRAM(s) Oral daily    MEDICATIONS  (PRN):  acetaminophen  Suppository .. 650 milliGRAM(s) Rectal every 6 hours PRN Temp greater or equal to 38C (100.4F), Mild Pain (1 - 3)  carboxymethylcellulose 0.5% (Preservative-Free) Ophthalmic Solution 1 Drop(s) Both EYES three times a day PRN dry eyes  glucagon  Injectable 1 milliGRAM(s) IntraMuscular once PRN Glucose LESS THAN 70 milligrams/deciliter  nystatin Powder 1 Application(s) Topical three times a day PRN under breasts      ---___---___---___---___---___---  REVIEW OF SYSTEM:  unable to obtain     ---___---___---___---___---___---  VITAL SIGNS:  69y , CAPILLARY BLOOD GLUCOSE      POCT Blood Glucose.: 152 mg/dL (2019 06:06)    T(C): 36.7 (19 @ 06:00), Max: 37.4 (19 @ 23:59)  HR: 105 (19 @ 06:00) (102 - 112)  BP: 145/87 (19 @ 06:00) (132/82 - 153/95)  RR: 18 (19 @ 06:00) (18 - 20)  SpO2: 100% (19 @ 06:00) (96% - 100%)  ---___---___---___---___---___---  PHYSICAL EXAM:    GEN: A&O X 0 , NAD , comfortable  HEENT: NCAT, PERRL, MMM, hearing intact  Neck: supple , no JVD  CVS: S1S2 , regular , No M/R/G appreciated  PULM: CTA B/L,  no W/R/R appreciated  ABD.: soft. non tender, non distended,  bowel sounds present peg noted   Extrem: intact pulses ,  edema left leg in brace  Derm: No rash , no ecchymoses stage 4 sacral decubitus noted mulitple pressure wounds noted to the legs as well      ---___---___---___---___---___---            LAB AND IMAGIN.1    18.43 )-----------( 239      ( 2019 10:03 )             24.9               07-28    130<L>  |  90<L>  |  131<H>  ----------------------------<  121<H>  3.7   |  19<L>  |  3.26<H>    Ca    8.3<L>      2019 07:24                                  [All pertinent / recent Imaging reviewed]         ---___---___---___---___---___---___ ---___---___---___---___---                         A S S E S S M E N T   A N D   P L A N :       libia continue to montior labs   renal on board following their reccomendations    chronic pain on  medical marijuana and gabapentin  sacral decubitus  continue wound care   asthma on budesonide and prednisone   hypothyroid on levothyrxine   dementia supportive care   anemia follow cbc   chronic gavin  maintain hygeine     overall prognosis poor                 -GI/DVT Prophylaxis.    --------------------------------------------  Case discussed with   Education given on   ___________________________  Thank you,  Deniz Bolden  [phone]

## 2019-07-29 NOTE — CONSULT NOTE ADULT - ATTENDING COMMENTS
70 yo female , well known to Wound Care x 4 mo, a/w dislodged PEG and associated bleeding - spontaneously subsided  has a known Stage 4 sacral wound, overall improved since initial onset 4 mo ago  Remains bedridden, contracted, incontinent of stool  With the assistance of 3 people, patient was turned in bed to facilitate exposure of sacral wound, which has no odor, no exposed bone, has abundant granulation , and is quite large  Is not in need of debridement  Dressed with aquacel  Is currently NPO , pending PEG replacement  OP f/u again advised
I have seen this patient with the fellow and agree with their assessment and plan. 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  Hospital course complicated and multi factorial TRANG -likely ATN now with crt in 2.9-3.4 range. 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  In addition, there is a ? solitary kidney but that sonogram was not good quality    The renal failure is ATN related but there is also urinalysis with some RBCS and WBC- can be seen with chronic gavin and osteo induced GN as well- but nothing can be different in terms of treatment. AIN possible from abx but not sure if that is the case. D/w with  and sister and primary team- not major different course of action as per primary Nephrologist. Overall prognosis is poor and she won't be a good dialysis candidate either.  Given this patient's severe comorbid conditions, poor prognosis and a "surprise question" response to not likely to survive past 6 months, she is not a good renal replacement therapy candidate. Would use conservative measures to treat all her renal complications such as pain meds, diuretics, anti nausea meds and other supportive measures. A palliative consult might be warranted if family allows.   Diuresis per volume exam and if significant oxygen requirement changes  EPO per renal team   Consider dc vitamin C as chronic use can lead to oxalate nephropathy  Consider getting a dedicated renal sonogram( if pt can get it) - spoke with Dr Shaw as might get a better sense of kidney size and if she truly has a solitary functioning kidney and could explain the non recoverable prognosis.    Brady Hernandez MD  Cell   Pager   Office

## 2019-07-29 NOTE — PROGRESS NOTE ADULT - SUBJECTIVE AND OBJECTIVE BOX
NEPHROLOGY    Patient seen and examined.  Not responsive to verbal stimuli  No significant events noted     MEDICATIONS  (STANDING):  ALBUTerol/ipratropium for Nebulization 3 milliLiter(s) Nebulizer every 6 hours  apixaban 2.5 milliGRAM(s) Oral two times a day  artificial tears (preservative free) Ophthalmic Solution 1 Drop(s) Both EYES four times a day  ascorbic acid 500 milliGRAM(s) Oral daily  BACItracin   Ointment 1 Application(s) Topical two times a day  chlorhexidine 2% Cloths 1 Application(s) Topical daily  Dakins Solution - 1/4 Strength 1 Application(s) Topical two times a day  dextrose 5%. 1000 milliLiter(s) (50 mL/Hr) IV Continuous <Continuous>  famotidine    Tablet 20 milliGRAM(s) Oral daily  ferrous    sulfate Liquid 300 milliGRAM(s) Enteral Tube daily  formoterol for nebulization 20 MICROGram(s) Nebulizer two times a day  gabapentin   Solution 300 milliGRAM(s) Oral every 12 hours  insulin lispro (HumaLOG) corrective regimen sliding scale   SubCutaneous every 6 hours  levothyroxine Injectable 60 MICROGram(s) IV Push at bedtime  Medical Marijuana Awake Oil 2 milliLiter(s),Medical Marijuana Awake Oil 2 milliLiter(s) 2 milliLiter(s) Enteral Tube <User Schedule>  Medical Marijuana Calm Oil 2 milliLiter(s),Medical [place] Calm Oil 2 milliLiter(s) 2 milliLiter(s) Enteral Tube <User Schedule>  Medical Marijuana [place] Oil 2 milliLiter(s),Medical Marijuana [place] Oil 2 milliLiter(s) 2 milliLiter(s) Enteral Tube <User Schedule>  metoprolol tartrate 12.5 milliGRAM(s) Oral two times a day  multivitamin/minerals/iron Oral Solution (CENTRUM) 15 milliLiter(s) Oral daily  QUEtiapine 25 milliGRAM(s) Oral at bedtime  sevelamer carbonate Powder 800 milliGRAM(s) Oral three times a day with meals  tiZANidine 4 milliGRAM(s) Oral <User Schedule>  zinc sulfate 220 milliGRAM(s) Oral daily    VITALS:  T(C): , Max: 37.4 (07-28-19 @ 23:59)  T(F): , Max: 99.3 (07-28-19 @ 23:59)  HR: 105 (07-29-19 @ 06:00)  BP: 145/87 (07-29-19 @ 06:00)  RR: 18 (07-29-19 @ 06:00)  SpO2: 100% (07-29-19 @ 06:00)    07-28 @ 07:01  -  07-29 @ 07:00  --------------------------------------------------------  IN: 1600 mL / OUT: 475 mL / NET: 1125 mL    REVIEW OF SYSTEMS:  UTO    PHYSICAL EXAM:  Constitutional: frail, cachectic  Respiratory: diminished B/L  Cardiovascular: S1 and S2  Gastrointestinal: + BS, soft, + PEG  Extremities: + edema, contracted  Neurological: non-communicative   : + gavin    LABS:                        8.4    18.89 )-----------( 255      ( 29 Jul 2019 10:11 )             25.1     07-29    128<L>  |  85<L>  |  135<H>  ----------------------------<  138<H>  3.9   |  20<L>  |  3.32<H>    Ca    8.3<L>      29 Jul 2019 06:49    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, left knee fracture, DM, RLE DVT, p/w dislodged PEG tube s/p replacement of PEG by IR c/b TRANG   - TRANG: multifactorial - non-oliguric (475 mL/24 hr) - azotemia rising - BUN also elevated due to steroids  - metabolic acidosis with high anion gap: from TRANG + sepsis  - hyponatremia: due to hypervolemia and TRANG  - hypervolemic on exam with LE edema and JVD  - anemia: of chronic dz - hgb improving    RECOMMEND:  - continue Nepro TF   - continue Renvela powder 800 mg TID via PEG  - consider checking lactate level   - maintain chronic gavin catheter   - monitor I/O   - dose medication for a GFR ~ 10-15 mL/min  - long term HD is not an option at all, doubt she will survive even a short term run; family not interested in RRT regardless  - palliative care for determination of goals of care     Laura Farr, NP  [facility]  [phone] NEPHROLOGY    Patient seen and examined.  Not responsive to verbal stimuli  No significant events noted     MEDICATIONS  (STANDING):  ALBUTerol/ipratropium for Nebulization 3 milliLiter(s) Nebulizer every 6 hours  apixaban 2.5 milliGRAM(s) Oral two times a day  artificial tears (preservative free) Ophthalmic Solution 1 Drop(s) Both EYES four times a day  ascorbic acid 500 milliGRAM(s) Oral daily  BACItracin   Ointment 1 Application(s) Topical two times a day  chlorhexidine 2% Cloths 1 Application(s) Topical daily  Dakins Solution - 1/4 Strength 1 Application(s) Topical two times a day  dextrose 5%. 1000 milliLiter(s) (50 mL/Hr) IV Continuous <Continuous>  famotidine    Tablet 20 milliGRAM(s) Oral daily  ferrous    sulfate Liquid 300 milliGRAM(s) Enteral Tube daily  formoterol for nebulization 20 MICROGram(s) Nebulizer two times a day  gabapentin   Solution 300 milliGRAM(s) Oral every 12 hours  insulin lispro (HumaLOG) corrective regimen sliding scale   SubCutaneous every 6 hours  levothyroxine Injectable 60 MICROGram(s) IV Push at bedtime  Medical Marijuana Awake Oil 2 milliLiter(s),Medical Marijuana Awake Oil 2 milliLiter(s) 2 milliLiter(s) Enteral Tube <User Schedule>  Medical Marijuana Calm Oil 2 milliLiter(s),Medical [place] Calm Oil 2 milliLiter(s) 2 milliLiter(s) Enteral Tube <User Schedule>  Medical Marijuana [place] Oil 2 milliLiter(s),Medical Marijuana [place] Oil 2 milliLiter(s) 2 milliLiter(s) Enteral Tube <User Schedule>  metoprolol tartrate 12.5 milliGRAM(s) Oral two times a day  multivitamin/minerals/iron Oral Solution (CENTRUM) 15 milliLiter(s) Oral daily  QUEtiapine 25 milliGRAM(s) Oral at bedtime  sevelamer carbonate Powder 800 milliGRAM(s) Oral three times a day with meals  tiZANidine 4 milliGRAM(s) Oral <User Schedule>  zinc sulfate 220 milliGRAM(s) Oral daily    VITALS:  T(C): , Max: 37.4 (07-28-19 @ 23:59)  T(F): , Max: 99.3 (07-28-19 @ 23:59)  HR: 105 (07-29-19 @ 06:00)  BP: 145/87 (07-29-19 @ 06:00)  RR: 18 (07-29-19 @ 06:00)  SpO2: 100% (07-29-19 @ 06:00)    07-28 @ 07:01  -  07-29 @ 07:00  --------------------------------------------------------  IN: 1600 mL / OUT: 475 mL / NET: 1125 mL    REVIEW OF SYSTEMS:  UTO    PHYSICAL EXAM:  Constitutional: frail, cachectic  Respiratory: diminished B/L  Cardiovascular: S1 and S2  Gastrointestinal: + BS, soft, + PEG  Extremities: + edema, contracted  Neurological: non-communicative   : + gavin    LABS:                        8.4    18.89 )-----------( 255      ( 29 Jul 2019 10:11 )             25.1     07-29    128<L>  |  85<L>  |  135<H>  ----------------------------<  138<H>  3.9   |  20<L>  |  3.32<H>    Ca    8.3<L>      29 Jul 2019 06:49    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, left knee fracture, DM, RLE DVT, p/w dislodged PEG tube s/p replacement of PEG by IR c/b TRANG   - TRANG: multifactorial - non-oliguric (475 mL/24 hr) - azotemia rising - BUN also elevated due to steroids  - metabolic acidosis with high anion gap: from TRANG + sepsis  - hyponatremia: due to hypervolemia and TRANG  - hypervolemic on exam with LE edema and JVD  - anemia: of chronic dz - hgb improving    RECOMMEND:  - continue Nepro TF   - continue Renvela powder 800 mg TID via PEG  - consider checking lactate level   - maintain chronic gavin catheter   - monitor I/O   - dose medication for a GFR ~ 10-15 mL/min  - second nephrology opinion   - palliative care for determination of goals of care     Laura Farr NP  [facility]  [phone] NEPHROLOGY    Patient seen and examined.  Not responsive to verbal stimuli  No significant events noted     MEDICATIONS  (STANDING):  ALBUTerol/ipratropium for Nebulization 3 milliLiter(s) Nebulizer every 6 hours  apixaban 2.5 milliGRAM(s) Oral two times a day  artificial tears (preservative free) Ophthalmic Solution 1 Drop(s) Both EYES four times a day  ascorbic acid 500 milliGRAM(s) Oral daily  BACItracin   Ointment 1 Application(s) Topical two times a day  chlorhexidine 2% Cloths 1 Application(s) Topical daily  Dakins Solution - 1/4 Strength 1 Application(s) Topical two times a day  dextrose 5%. 1000 milliLiter(s) (50 mL/Hr) IV Continuous <Continuous>  famotidine    Tablet 20 milliGRAM(s) Oral daily  ferrous    sulfate Liquid 300 milliGRAM(s) Enteral Tube daily  formoterol for nebulization 20 MICROGram(s) Nebulizer two times a day  gabapentin   Solution 300 milliGRAM(s) Oral every 12 hours  insulin lispro (HumaLOG) corrective regimen sliding scale   SubCutaneous every 6 hours  levothyroxine Injectable 60 MICROGram(s) IV Push at bedtime  Medical Marijuana Awake Oil 2 milliLiter(s),Medical Marijuana Awake Oil 2 milliLiter(s) 2 milliLiter(s) Enteral Tube <User Schedule>  Medical Marijuana Calm Oil 2 milliLiter(s),Medical [place] Calm Oil 2 milliLiter(s) 2 milliLiter(s) Enteral Tube <User Schedule>  Medical Marijuana [place] Oil 2 milliLiter(s),Medical Marijuana [place] Oil 2 milliLiter(s) 2 milliLiter(s) Enteral Tube <User Schedule>  metoprolol tartrate 12.5 milliGRAM(s) Oral two times a day  multivitamin/minerals/iron Oral Solution (CENTRUM) 15 milliLiter(s) Oral daily  QUEtiapine 25 milliGRAM(s) Oral at bedtime  sevelamer carbonate Powder 800 milliGRAM(s) Oral three times a day with meals  tiZANidine 4 milliGRAM(s) Oral <User Schedule>  zinc sulfate 220 milliGRAM(s) Oral daily    VITALS:  T(C): , Max: 37.4 (07-28-19 @ 23:59)  T(F): , Max: 99.3 (07-28-19 @ 23:59)  HR: 105 (07-29-19 @ 06:00)  BP: 145/87 (07-29-19 @ 06:00)  RR: 18 (07-29-19 @ 06:00)  SpO2: 100% (07-29-19 @ 06:00)    07-28 @ 07:01  -  07-29 @ 07:00  --------------------------------------------------------  IN: 1600 mL / OUT: 475 mL / NET: 1125 mL    REVIEW OF SYSTEMS:  UTO    PHYSICAL EXAM:  Constitutional: frail, cachectic  Respiratory: diminished B/L  Cardiovascular: S1 and S2  Gastrointestinal: + BS, soft, + PEG  Extremities: + edema, contracted  Neurological: non-communicative   : + gavin    LABS:                        8.4    18.89 )-----------( 255      ( 29 Jul 2019 10:11 )             25.1     07-29    128<L>  |  85<L>  |  135<H>  ----------------------------<  138<H>  3.9   |  20<L>  |  3.32<H>    Ca    8.3<L>      29 Jul 2019 06:49    ASSESSMENT

## 2019-07-29 NOTE — CONSULT NOTE ADULT - PROVIDER SPECIALTY LIST ADULT
Cardiology
Gastroenterology
Gastroenterology
Nephrology
Nephrology
Pulmonology
Wound Care
Infectious Disease

## 2019-07-29 NOTE — CONSULT NOTE ADULT - CONSULT REASON
Dysphagia  Family seeks 2nd opinion GI input and switch GI care
TRANG
TRANG, Hyponatremia and hyperkalemia.
Volume overload
dislodged peg
pna
Sacral wound
fever

## 2019-07-29 NOTE — PROGRESS NOTE ADULT - ASSESSMENT
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  Fever and leukocytosis now. Abnormal CT chest.    , patient found with hypoxia and AMS suspect from aspiration event.            aspiration/pna        unresponsive today  reculture for signs  of infection        completed meropenem   at high risk of reaspiration  and decub related sepsis . last rrp was not likely due to sepsis  continues to do poorly but renal function stable  reculture prn  discussed with     call ID for any acute change    at very high risk for pna

## 2019-07-30 LAB
ALBUMIN SERPL ELPH-MCNC: 2.8 G/DL — LOW (ref 3.3–5)
ALP SERPL-CCNC: 76 U/L — SIGNIFICANT CHANGE UP (ref 40–120)
ALT FLD-CCNC: 8 U/L — LOW (ref 10–45)
ANION GAP SERPL CALC-SCNC: 21 MMOL/L — HIGH (ref 5–17)
AST SERPL-CCNC: 9 U/L — LOW (ref 10–40)
BILIRUB SERPL-MCNC: 0.3 MG/DL — SIGNIFICANT CHANGE UP (ref 0.2–1.2)
BUN SERPL-MCNC: 140 MG/DL — HIGH (ref 7–23)
CALCIUM SERPL-MCNC: 8.2 MG/DL — LOW (ref 8.4–10.5)
CHLORIDE SERPL-SCNC: 89 MMOL/L — LOW (ref 96–108)
CO2 SERPL-SCNC: 21 MMOL/L — LOW (ref 22–31)
CREAT SERPL-MCNC: 3.49 MG/DL — HIGH (ref 0.5–1.3)
GLUCOSE BLDC GLUCOMTR-MCNC: 122 MG/DL — HIGH (ref 70–99)
GLUCOSE BLDC GLUCOMTR-MCNC: 142 MG/DL — HIGH (ref 70–99)
GLUCOSE BLDC GLUCOMTR-MCNC: 147 MG/DL — HIGH (ref 70–99)
GLUCOSE BLDC GLUCOMTR-MCNC: 162 MG/DL — HIGH (ref 70–99)
GLUCOSE BLDC GLUCOMTR-MCNC: 168 MG/DL — HIGH (ref 70–99)
GLUCOSE BLDC GLUCOMTR-MCNC: 64 MG/DL — LOW (ref 70–99)
GLUCOSE SERPL-MCNC: 131 MG/DL — HIGH (ref 70–99)
MAGNESIUM SERPL-MCNC: 2.1 MG/DL — SIGNIFICANT CHANGE UP (ref 1.6–2.6)
PHOSPHATE SERPL-MCNC: 6.5 MG/DL — HIGH (ref 2.5–4.5)
POTASSIUM SERPL-MCNC: 4 MMOL/L — SIGNIFICANT CHANGE UP (ref 3.5–5.3)
POTASSIUM SERPL-SCNC: 4 MMOL/L — SIGNIFICANT CHANGE UP (ref 3.5–5.3)
PROT SERPL-MCNC: 5.6 G/DL — LOW (ref 6–8.3)
SODIUM SERPL-SCNC: 133 MMOL/L — LOW (ref 135–145)

## 2019-07-30 RX ORDER — GABAPENTIN 400 MG/1
300 CAPSULE ORAL DAILY
Refills: 0 | Status: DISCONTINUED | OUTPATIENT
Start: 2019-07-30 | End: 2019-08-07

## 2019-07-30 RX ORDER — ERYTHROPOIETIN 10000 [IU]/ML
10000 INJECTION, SOLUTION INTRAVENOUS; SUBCUTANEOUS ONCE
Refills: 0 | Status: COMPLETED | OUTPATIENT
Start: 2019-07-30 | End: 2019-07-30

## 2019-07-30 RX ORDER — FAMOTIDINE 10 MG/ML
20 INJECTION INTRAVENOUS
Refills: 0 | Status: DISCONTINUED | OUTPATIENT
Start: 2019-07-31 | End: 2019-08-07

## 2019-07-30 RX ORDER — FUROSEMIDE 40 MG
60 TABLET ORAL ONCE
Refills: 0 | Status: COMPLETED | OUTPATIENT
Start: 2019-07-30 | End: 2019-07-30

## 2019-07-30 RX ADMIN — CHLORHEXIDINE GLUCONATE 1 APPLICATION(S): 213 SOLUTION TOPICAL at 12:13

## 2019-07-30 RX ADMIN — Medication 1 APPLICATION(S): at 06:42

## 2019-07-30 RX ADMIN — Medication 3 MILLILITER(S): at 12:13

## 2019-07-30 RX ADMIN — Medication 1 DROP(S): at 17:22

## 2019-07-30 RX ADMIN — Medication 12.5 MILLIGRAM(S): at 06:43

## 2019-07-30 RX ADMIN — Medication 3 MILLILITER(S): at 06:42

## 2019-07-30 RX ADMIN — SEVELAMER CARBONATE 800 MILLIGRAM(S): 2400 POWDER, FOR SUSPENSION ORAL at 12:12

## 2019-07-30 RX ADMIN — TIZANIDINE 4 MILLIGRAM(S): 4 TABLET ORAL at 08:58

## 2019-07-30 RX ADMIN — GABAPENTIN 300 MILLIGRAM(S): 400 CAPSULE ORAL at 12:12

## 2019-07-30 RX ADMIN — Medication 20 MICROGRAM(S): at 17:23

## 2019-07-30 RX ADMIN — APIXABAN 2.5 MILLIGRAM(S): 2.5 TABLET, FILM COATED ORAL at 06:43

## 2019-07-30 RX ADMIN — QUETIAPINE FUMARATE 25 MILLIGRAM(S): 200 TABLET, FILM COATED ORAL at 22:53

## 2019-07-30 RX ADMIN — Medication 15 MILLILITER(S): at 12:12

## 2019-07-30 RX ADMIN — Medication 1 DROP(S): at 12:13

## 2019-07-30 RX ADMIN — Medication 3 MILLILITER(S): at 23:01

## 2019-07-30 RX ADMIN — APIXABAN 2.5 MILLIGRAM(S): 2.5 TABLET, FILM COATED ORAL at 17:22

## 2019-07-30 RX ADMIN — Medication 60 MILLIGRAM(S): at 09:22

## 2019-07-30 RX ADMIN — SEVELAMER CARBONATE 800 MILLIGRAM(S): 2400 POWDER, FOR SUSPENSION ORAL at 08:58

## 2019-07-30 RX ADMIN — Medication 1 MILLIGRAM(S): at 22:53

## 2019-07-30 RX ADMIN — SEVELAMER CARBONATE 800 MILLIGRAM(S): 2400 POWDER, FOR SUSPENSION ORAL at 17:22

## 2019-07-30 RX ADMIN — Medication 12.5 MILLIGRAM(S): at 17:24

## 2019-07-30 RX ADMIN — Medication 300 MILLIGRAM(S): at 12:12

## 2019-07-30 RX ADMIN — Medication 1: at 00:49

## 2019-07-30 RX ADMIN — Medication 1 DROP(S): at 06:43

## 2019-07-30 RX ADMIN — Medication 20 MICROGRAM(S): at 06:42

## 2019-07-30 RX ADMIN — Medication 60 MICROGRAM(S): at 22:53

## 2019-07-30 RX ADMIN — ERYTHROPOIETIN 10000 UNIT(S): 10000 INJECTION, SOLUTION INTRAVENOUS; SUBCUTANEOUS at 13:16

## 2019-07-30 RX ADMIN — Medication 1 APPLICATION(S): at 17:23

## 2019-07-30 RX ADMIN — TIZANIDINE 4 MILLIGRAM(S): 4 TABLET ORAL at 20:53

## 2019-07-30 RX ADMIN — ZINC SULFATE TAB 220 MG (50 MG ZINC EQUIVALENT) 220 MILLIGRAM(S): 220 (50 ZN) TAB at 12:12

## 2019-07-30 RX ADMIN — Medication 3 MILLILITER(S): at 00:26

## 2019-07-30 RX ADMIN — Medication 3 MILLILITER(S): at 17:22

## 2019-07-30 NOTE — PROGRESS NOTE ADULT - ASSESSMENT
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, left knee fracture, DM, RLE DVT, p/w dislodged PEG tube s/p replacement of PEG by IR c/b TRANG   - TRANG: multifactorial - non-oliguric - azotemia rising - BUN also elevated due to steroids  - metabolic acidosis with high anion gap: from TRANG + sepsis - improving   - hyponatremia: due to hypervolemia and TRANG - improving   - hypervolemic on exam with LE edema and JVD  - anemia: of chronic dz - hgb < goal yesterday  - hyperphosphatemia: due to CKD/TRANG - on binders    RECOMMEND:  - second nephrologist consultation noted  - renal ultrasound noted (chronic discrepancy in kidney size R>L)  - consider palliative care consultation to determine GOC  - give furosemide 60 mg IV x 1   - continue Nepro TF   - continue Renvela powder 800 mg TID via PEG  - maintain chronic gavin catheter   - dose medication for a GFR ~ 10-15 mL/min    Laura Farr NP  [facility]  [phone]

## 2019-07-30 NOTE — PROGRESS NOTE ADULT - SUBJECTIVE AND OBJECTIVE BOX
  NEPHROLOGY    Patient seen and examined.  Not responsive to verbal stimuli  No significant events noted     MEDICATIONS  (STANDING):  ALBUTerol/ipratropium for Nebulization 3 milliLiter(s) Nebulizer every 6 hours  apixaban 2.5 milliGRAM(s) Oral two times a day  artificial tears (preservative free) Ophthalmic Solution 1 Drop(s) Both EYES four times a day  BACItracin   Ointment 1 Application(s) Topical two times a day  chlorhexidine 2% Cloths 1 Application(s) Topical daily  clonazePAM  Tablet 1 milliGRAM(s) Oral at bedtime  Dakins Solution - 1/4 Strength 1 Application(s) Topical two times a day  dextrose 5%. 1000 milliLiter(s) (50 mL/Hr) IV Continuous <Continuous>  ferrous    sulfate Liquid 300 milliGRAM(s) Enteral Tube daily  formoterol for nebulization 20 MICROGram(s) Nebulizer two times a day  gabapentin   Solution 300 milliGRAM(s) Oral daily  insulin lispro (HumaLOG) corrective regimen sliding scale   SubCutaneous every 6 hours  levothyroxine Injectable 60 MICROGram(s) IV Push at bedtime  Medical Marijuana Awake Oil 2 milliLiter(s),Medical Marijuana Awake Oil 2 milliLiter(s) 2 milliLiter(s) Enteral Tube <User Schedule>  Medical Marijuana Calm Oil 2 milliLiter(s),Medical [place] Calm Oil 2 milliLiter(s) 2 milliLiter(s) Enteral Tube <User Schedule>  Medical Marijuana [place] Oil 2 milliLiter(s),Medical Marijuana [place] Oil 2 milliLiter(s) 2 milliLiter(s) Enteral Tube <User Schedule>  metoprolol tartrate 12.5 milliGRAM(s) Oral two times a day  multivitamin/minerals/iron Oral Solution (CENTRUM) 15 milliLiter(s) Oral daily  QUEtiapine 25 milliGRAM(s) Oral at bedtime  sevelamer carbonate Powder 800 milliGRAM(s) Oral three times a day with meals  tiZANidine 4 milliGRAM(s) Oral <User Schedule>  zinc sulfate 220 milliGRAM(s) Oral daily    VITALS:  T(C): , Max: 36.9 (07-30-19 @ 06:31)  T(F): , Max: 98.5 (07-30-19 @ 06:31)  HR: 112 (07-30-19 @ 06:31)  BP: 129/76 (07-30-19 @ 06:31)  RR: 18 (07-30-19 @ 06:31)  SpO2: 99% (07-30-19 @ 06:31)    07-29 @ 07:01  -  07-30 @ 07:00  --------------------------------------------------------  IN: 1080 mL / OUT: 375 mL / NET: 705 mL    PHYSICAL EXAM:  Constitutional: frail, cachectic  Respiratory: diminished B/L  Cardiovascular: S1 and S2  Gastrointestinal: + BS, soft, + PEG  Extremities: + edema, contracted  Neurological: non-communicative   : + gavin    LABS:                        8.4    18.89 )-----------( 255      ( 29 Jul 2019 10:11 )             25.1     07-30    133<L>  |  89<L>  |  140<H>  ----------------------------<  131<H>  4.0   |  21<L>  |  3.49<H>    Ca    8.2<L>      30 Jul 2019 06:43  Phos  6.5     07-30  Mg     2.1     07-30    TPro  5.6<L>  /  Alb  2.8<L>  /  TBili  0.3  /  DBili  x   /  AST  9<L>  /  ALT  8<L>  /  AlkPhos  76  07-30    RADIOLOGY & ADDITIONAL STUDIES:  < from: US Renal (07.29.19 @ 16:26) >  EXAM:  US KIDNEY(S)                            PROCEDURE DATE:  07/29/2019        INTERPRETATION:  CLINICAL INFORMATION: Renal failure    COMPARISON: CT chest abdomen pelvis 9/20/2016    TECHNIQUE: Sonography of the kidneys and bladder.     FINDINGS:  Both kidneys demonstrate increased cortical echogenicity, but maintain   cortical thickness at approximately 10 mm.  Right kidney:  11.4 cm. No renal mass, hydronephrosis or calculi.    Left kidney:  7.5 cm. No renal mass, hydronephrosis or calculi.    Urinary bladder: Could not be visualized.    IMPRESSION:     Both kidneys demonstrate increased cortical echogenicity, but maintain   cortical thickness at approximately 10 mm.    The right kidney measures 11.4 cm in sagittal dimension and the left 7.5   cm in sagittal dimension. 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, left knee fracture, DM, RLE DVT, p/w dislodged PEG tube s/p replacement of PEG by IR c/b TRANG   - TRANG: multifactorial - non-oliguric - azotemia rising - BUN also elevated due to steroids  - metabolic acidosis with high anion gap: from TRANG + sepsis - improving   - hyponatremia: due to hypervolemia and TRANG - improving   - hypervolemic on exam with LE edema and JVD  - anemia: of chronic dz - hgb < goal yesterday  - hyperphosphatemia: due to CKD/TRANG - on binders    RECOMMEND:  - second nephrologist consultation noted  - renal ultrasound noted (chronic discrepancy in kidney size R>L)  - consider palliative care consultation to determine GOC  - give furosemide 60 mg IV x 1   - continue Nepro TF   - continue Renvela powder 800 mg TID via PEG  - maintain chronic gavin catheter   - dose medication for a GFR ~ 10-15 mL/min    Laura Farr NP  [facility]  [phone]   NEPHROLOGY    Patient seen and examined.  Not responsive to verbal stimuli  No significant events noted     MEDICATIONS  (STANDING):  ALBUTerol/ipratropium for Nebulization 3 milliLiter(s) Nebulizer every 6 hours  apixaban 2.5 milliGRAM(s) Oral two times a day  artificial tears (preservative free) Ophthalmic Solution 1 Drop(s) Both EYES four times a day  BACItracin   Ointment 1 Application(s) Topical two times a day  chlorhexidine 2% Cloths 1 Application(s) Topical daily  clonazePAM  Tablet 1 milliGRAM(s) Oral at bedtime  Dakins Solution - 1/4 Strength 1 Application(s) Topical two times a day  dextrose 5%. 1000 milliLiter(s) (50 mL/Hr) IV Continuous <Continuous>  ferrous    sulfate Liquid 300 milliGRAM(s) Enteral Tube daily  formoterol for nebulization 20 MICROGram(s) Nebulizer two times a day  gabapentin   Solution 300 milliGRAM(s) Oral daily  insulin lispro (HumaLOG) corrective regimen sliding scale   SubCutaneous every 6 hours  levothyroxine Injectable 60 MICROGram(s) IV Push at bedtime  Medical Marijuana Awake Oil 2 milliLiter(s),Medical Marijuana Awake Oil 2 milliLiter(s) 2 milliLiter(s) Enteral Tube <User Schedule>  Medical Marijuana Calm Oil 2 milliLiter(s),Medical [place] Calm Oil 2 milliLiter(s) 2 milliLiter(s) Enteral Tube <User Schedule>  Medical Marijuana [place] Oil 2 milliLiter(s),Medical Marijuana [place] Oil 2 milliLiter(s) 2 milliLiter(s) Enteral Tube <User Schedule>  metoprolol tartrate 12.5 milliGRAM(s) Oral two times a day  multivitamin/minerals/iron Oral Solution (CENTRUM) 15 milliLiter(s) Oral daily  QUEtiapine 25 milliGRAM(s) Oral at bedtime  sevelamer carbonate Powder 800 milliGRAM(s) Oral three times a day with meals  tiZANidine 4 milliGRAM(s) Oral <User Schedule>  zinc sulfate 220 milliGRAM(s) Oral daily    VITALS:  T(C): , Max: 36.9 (07-30-19 @ 06:31)  T(F): , Max: 98.5 (07-30-19 @ 06:31)  HR: 112 (07-30-19 @ 06:31)  BP: 129/76 (07-30-19 @ 06:31)  RR: 18 (07-30-19 @ 06:31)  SpO2: 99% (07-30-19 @ 06:31)    07-29 @ 07:01  -  07-30 @ 07:00  --------------------------------------------------------  IN: 1080 mL / OUT: 375 mL / NET: 705 mL    PHYSICAL EXAM:  Constitutional: frail, cachectic  Respiratory: diminished B/L  Cardiovascular: S1 and S2  Gastrointestinal: + BS, soft, + PEG  Extremities: + edema, contracted  Neurological: non-communicative   : + gavin    LABS:                        8.4    18.89 )-----------( 255      ( 29 Jul 2019 10:11 )             25.1     07-30    133<L>  |  89<L>  |  140<H>  ----------------------------<  131<H>  4.0   |  21<L>  |  3.49<H>    Ca    8.2<L>      30 Jul 2019 06:43  Phos  6.5     07-30  Mg     2.1     07-30    TPro  5.6<L>  /  Alb  2.8<L>  /  TBili  0.3  /  DBili  x   /  AST  9<L>  /  ALT  8<L>  /  AlkPhos  76  07-30    RADIOLOGY & ADDITIONAL STUDIES:  < from: US Renal (07.29.19 @ 16:26) >  EXAM:  US KIDNEY(S)                            PROCEDURE DATE:  07/29/2019        INTERPRETATION:  CLINICAL INFORMATION: Renal failure    COMPARISON: CT chest abdomen pelvis 9/20/2016    TECHNIQUE: Sonography of the kidneys and bladder.     FINDINGS:  Both kidneys demonstrate increased cortical echogenicity, but maintain   cortical thickness at approximately 10 mm.  Right kidney:  11.4 cm. No renal mass, hydronephrosis or calculi.    Left kidney:  7.5 cm. No renal mass, hydronephrosis or calculi.    Urinary bladder: Could not be visualized.    IMPRESSION:     Both kidneys demonstrate increased cortical echogenicity, but maintain   cortical thickness at approximately 10 mm.    The right kidney measures 11.4 cm in sagittal dimension and the left 7.5   cm in sagittal dimension. The size discrepancy is unchanged dating back   to a CT of the chest abdomen pelvis on 9/20/2016;    < end of copied text >    ASSESSMENT

## 2019-07-30 NOTE — PROGRESS NOTE ADULT - SUBJECTIVE AND OBJECTIVE BOX
---___---___---___---___---___---___ ---___---___---___---___---___---___---___---___---                  M E D I C A L   A T T E N D I N G   P R O G R E S S   N O T E  ---___---___---___---___---___---___ ---___---___---___---___---___---___---___---___---        ================================================    ++CHIEF COMPLAINT:   Patient is a 69y old  Female who presents with a chief complaint of PEG tube dislodgement, fever, leukocytosis (2019 15:04)      Gastrostomy malfunction    ---___---___---___---___---___---  PAST MEDICAL / Surgical  HISTORY:  PAST MEDICAL & SURGICAL HISTORY:  Type 2 diabetes mellitus  Dementia  DVT, lower extremity  CVA (cerebral vascular accident)  Fatty pancreas  PNA (pneumonia)  Pulmonary HTN  IGT (impaired glucose tolerance)  Ulcerative colitis  Acid reflux  Anxiety  Depression  Mouth sores  HLD (hyperlipidemia)  Asthma  S/P percutaneous endoscopic gastrostomy (PEG) tube placement: for dysphagia  Humeral head fracture  H/O: hysterectomy  H/O cataract extraction, left  History of knee replacement      ---___---___---___---___---___---  FAMILY HISTORY:   FAMILY HISTORY:  Family history of dementia (Grandparent)  Family history of colon cancer (Grandparent)        ---___---___---___---___---___---  ALLERGIES:   Allergies    ASA; dye contrast (Anaphylaxis)  aspirin (Short breath)  divalproex sodium (Other (Unknown))  Flowers and Plants (Short breath)  Haldol (Other (Unknown))  penicillin (Short breath; Rash)  sulfa drugs (Short breath; Rash)  vancomycin (Rash; Urticaria; Hives)  Xanax (Other (Unknown))    Intolerances        ---___---___---___---___---___---  MEDICATIONS:  MEDICATIONS  (STANDING):  ALBUTerol/ipratropium for Nebulization 3 milliLiter(s) Nebulizer every 6 hours  apixaban 2.5 milliGRAM(s) Oral two times a day  artificial tears (preservative free) Ophthalmic Solution 1 Drop(s) Both EYES four times a day  ascorbic acid 500 milliGRAM(s) Oral daily  BACItracin   Ointment 1 Application(s) Topical two times a day  chlorhexidine 2% Cloths 1 Application(s) Topical daily  clonazePAM  Tablet 1 milliGRAM(s) Oral at bedtime  Dakins Solution - 1/4 Strength 1 Application(s) Topical two times a day  dextrose 5%. 1000 milliLiter(s) (50 mL/Hr) IV Continuous <Continuous>  famotidine    Tablet 20 milliGRAM(s) Oral daily  ferrous    sulfate Liquid 300 milliGRAM(s) Enteral Tube daily  formoterol for nebulization 20 MICROGram(s) Nebulizer two times a day  gabapentin   Solution 300 milliGRAM(s) Oral every 12 hours  insulin lispro (HumaLOG) corrective regimen sliding scale   SubCutaneous every 6 hours  levothyroxine Injectable 60 MICROGram(s) IV Push at bedtime  Medical Marijuana Awake Oil 2 milliLiter(s),Medical Marijuana [place] Oil 2 milliLiter(s) 2 milliLiter(s) Enteral Tube <User Schedule>  Medical Marijuana Calm Oil 2 milliLiter(s),Medical [place] Calm Oil 2 milliLiter(s) 2 milliLiter(s) Enteral Tube <User Schedule>  Medical Marijuana [place] Oil 2 milliLiter(s),Medical Marijuana [place] Oil 2 milliLiter(s) 2 milliLiter(s) Enteral Tube <User Schedule>  metoprolol tartrate 12.5 milliGRAM(s) Oral two times a day  multivitamin/minerals/iron Oral Solution (CENTRUM) 15 milliLiter(s) Oral daily  QUEtiapine 25 milliGRAM(s) Oral at bedtime  sevelamer carbonate Powder 800 milliGRAM(s) Oral three times a day with meals  tiZANidine 4 milliGRAM(s) Oral <User Schedule>  zinc sulfate 220 milliGRAM(s) Oral daily    MEDICATIONS  (PRN):  acetaminophen  Suppository .. 650 milliGRAM(s) Rectal every 6 hours PRN Temp greater or equal to 38C (100.4F), Mild Pain (1 - 3)  carboxymethylcellulose 0.5% (Preservative-Free) Ophthalmic Solution 1 Drop(s) Both EYES three times a day PRN dry eyes  glucagon  Injectable 1 milliGRAM(s) IntraMuscular once PRN Glucose LESS THAN 70 milligrams/deciliter  nystatin Powder 1 Application(s) Topical three times a day PRN under breasts      ---___---___---___---___---___---  REVIEW OF SYSTEM:    GEN: no fever, no chills, no pain  RESP: no SOB, no cough, no sputum  CVS: no chest pain, no palpitations, no edema  GI: no abdominal pain, no nausea, no vomiting, no constipation, no diarrhea  : no dysurea, no frequency, no hematurea  Neuro: no headache, no dizziness  PSYCH: no anxiety, no depression  Derm : no itching, no rash    ---___---___---___---___---___---  VITAL SIGNS:  69y , CAPILLARY BLOOD GLUCOSE      POCT Blood Glucose.: 142 mg/dL (2019 06:41)    T(C): 36.9 (19 @ 06:31), Max: 36.9 (19 @ 06:31)  HR: 112 (19 @ 06:31) (101 - 112)  BP: 129/76 (19 @ 06:31) (129/76 - 154/83)  RR: 18 (19 @ 06:31) (17 - 18)  SpO2: 99% (19 @ 06:31) (97% - 100%)  ---___---___---___---___---___---  PHYSICAL EXAM:    GEN: A&O X 3 , NAD , comfortable  HEENT: NCAT, PERRL, MMM, hearing intact  Neck: supple , no JVD  CVS: S1S2 , regular , No M/R/G appreciated  PULM: CTA B/L,  no W/R/R appreciated  ABD.: soft. non tender, non distended,  bowel sounds present  Extrem: intact pulses , no edema   Derm: No rash , no ecchymoses  PSYCH : normal mood,  no delusion not anxious     ---___---___---___---___---___---            LAB AND IMAGIN.4    18.89 )-----------( 255      ( 2019 10:11 )             25.1               07-30    133<L>  |  89<L>  |  x   ----------------------------<  x   4.0   |  x   |  x     Ca    8.3<L>      2019 06:49                                  [All pertinent / recent Imaging reviewed]         ---___---___---___---___---___---___ ---___---___---___---___---                         A S S E S S M E N T   A N D   P L A N :      HEALTH ISSUES - PROBLEM Dx:  TRANG (acute kidney injury): TRANG (acute kidney injury)  try and  reconcile meds to cut down  nephrotoxic meds   usd kidney size unchanged from ct scan 2016   renal on board    continue pain management for chronic leg fracture    dm2 on insulin     htn /chf on coreg       sacral decubitus, multiple pressure ulcers continue wound care     patient prognosis overall poor                   -GI/DVT Prophylaxis.    --------------------------------------------  Case discussed with   Education given on   ___________________________  Thank you,  Deniz Bolden  [phone]

## 2019-07-30 NOTE — PROGRESS NOTE ADULT - SUBJECTIVE AND OBJECTIVE BOX
Patient is a 69y old  Female who presented with a chief complaint of PEG tube dislodgement, fever, leukocytosis (30 Jul 2019 07:43)      INTERVAL HPI/OVERNIGHT EVENTS:  PEG functioning well  no GI events per report    MEDICATIONS  (STANDING):  ALBUTerol/ipratropium for Nebulization 3 milliLiter(s) Nebulizer every 6 hours  apixaban 2.5 milliGRAM(s) Oral two times a day  artificial tears (preservative free) Ophthalmic Solution 1 Drop(s) Both EYES four times a day  BACItracin   Ointment 1 Application(s) Topical two times a day  chlorhexidine 2% Cloths 1 Application(s) Topical daily  clonazePAM  Tablet 1 milliGRAM(s) Oral at bedtime  Dakins Solution - 1/4 Strength 1 Application(s) Topical two times a day  dextrose 5%. 1000 milliLiter(s) (50 mL/Hr) IV Continuous <Continuous>  ferrous    sulfate Liquid 300 milliGRAM(s) Enteral Tube daily  formoterol for nebulization 20 MICROGram(s) Nebulizer two times a day  gabapentin   Solution 300 milliGRAM(s) Oral daily  insulin lispro (HumaLOG) corrective regimen sliding scale   SubCutaneous every 6 hours  levothyroxine Injectable 60 MICROGram(s) IV Push at bedtime  Medical Marijuana [place]ake Oil 2 milliLiter(s),Medical Marijuana [place] Oil 2 milliLiter(s) 2 milliLiter(s) Enteral Tube <User Schedule>  Medical Marijuana Calm Oil 2 milliLiter(s),Medical [place] Calm Oil 2 milliLiter(s) 2 milliLiter(s) Enteral Tube <User Schedule>  Medical Marijuana [place] Oil 2 milliLiter(s),Medical Marijuana [place] Oil 2 milliLiter(s) 2 milliLiter(s) Enteral Tube <User Schedule>  metoprolol tartrate 12.5 milliGRAM(s) Oral two times a day  multivitamin/minerals/iron Oral Solution (CENTRUM) 15 milliLiter(s) Oral daily  QUEtiapine 25 milliGRAM(s) Oral at bedtime  sevelamer carbonate Powder 800 milliGRAM(s) Oral three times a day with meals  tiZANidine 4 milliGRAM(s) Oral <User Schedule>  zinc sulfate 220 milliGRAM(s) Oral daily    MEDICATIONS  (PRN):  acetaminophen  Suppository .. 650 milliGRAM(s) Rectal every 6 hours PRN Temp greater or equal to 38C (100.4F), Mild Pain (1 - 3)  carboxymethylcellulose 0.5% (Preservative-Free) Ophthalmic Solution 1 Drop(s) Both EYES three times a day PRN dry eyes  glucagon  Injectable 1 milliGRAM(s) IntraMuscular once PRN Glucose LESS THAN 70 milligrams/deciliter  nystatin Powder 1 Application(s) Topical three times a day PRN under breasts      Allergies  ASA; dye contrast (Anaphylaxis)  aspirin (Short breath)  divalproex sodium (Other (Unknown))  Flowers and Plants (Short breath)  Haldol (Other (Unknown))  penicillin (Short breath; Rash)  sulfa drugs (Short breath; Rash)  vancomycin (Rash; Urticaria; Hives)  Xanax (Other (Unknown))      Review of Systems:  unable to obtain from pt; history from chart review, family and discussion with team      Vital Signs Last 24 Hrs  T(C): 37.1 (30 Jul 2019 11:38), Max: 37.1 (30 Jul 2019 11:38)  T(F): 98.8 (30 Jul 2019 11:38), Max: 98.8 (30 Jul 2019 11:38)  HR: 105 (30 Jul 2019 11:38) (104 - 112)  BP: 109/70 (30 Jul 2019 11:38) (109/70 - 154/83)  BP(mean): --  RR: 18 (30 Jul 2019 11:38) (18 - 18)  SpO2: 96% (30 Jul 2019 11:38) (96% - 100%)    PHYSICAL EXAM:    Constitutional: frail elderly chronically ill appearing +severe contractures, non verbal  eyes open spouse at bedside +anasarca and muscle wasting  Neck: No JVD  Respiratory: + rhonchi , decreased BS bases  Cardiovascular: S1 and S2 regular  Gastrointestinal: BS+, soft, +G tube  high in epigastric area  bumper at 2cm richard   Extremities: anasarca/+edema and muscle wasting +dressings in place ++contractures  Vascular: 2+ peripheral pulses  Neurological: lethargic, no focal asymmetry  Psychiatric: non verbal   Skin: anicteric  +skin dressings and heel pad cushions in place  multiple decubiti (wound care following)      LABS:                        8.4    18.89 )-----------( 255      ( 29 Jul 2019 10:11 )             25.1     07-30    133<L>  |  89<L>  |  140<H>  ----------------------------<  131<H>  4.0   |  21<L>  |  3.49<H>    Ca    8.2<L>      30 Jul 2019 06:43  Phos  6.5     07-30  Mg     2.1     07-30    TPro  5.6<L>  /  Alb  2.8<L>  /  TBili  0.3  /  DBili  x   /  AST  9<L>  /  ALT  8<L>  /  AlkPhos  76  07-30        RADIOLOGY & ADDITIONAL TESTS: Patient is a 69y old  Female who presented with a chief complaint of PEG tube dislodgement, fever, leukocytosis (30 Jul 2019 07:43)    INTERVAL HPI/OVERNIGHT EVENTS:  PEG functioning well  no GI events per report    MEDICATIONS  (STANDING):  ALBUTerol/ipratropium for Nebulization 3 milliLiter(s) Nebulizer every 6 hours  apixaban 2.5 milliGRAM(s) Oral two times a day  artificial tears (preservative free) Ophthalmic Solution 1 Drop(s) Both EYES four times a day  BACItracin   Ointment 1 Application(s) Topical two times a day  chlorhexidine 2% Cloths 1 Application(s) Topical daily  clonazePAM  Tablet 1 milliGRAM(s) Oral at bedtime  Dakins Solution - 1/4 Strength 1 Application(s) Topical two times a day  dextrose 5%. 1000 milliLiter(s) (50 mL/Hr) IV Continuous <Continuous>  ferrous    sulfate Liquid 300 milliGRAM(s) Enteral Tube daily  formoterol for nebulization 20 MICROGram(s) Nebulizer two times a day  gabapentin   Solution 300 milliGRAM(s) Oral daily  insulin lispro (HumaLOG) corrective regimen sliding scale   SubCutaneous every 6 hours  levothyroxine Injectable 60 MICROGram(s) IV Push at bedtime  Medical Marijuana [place] Oil 2 milliLiter(s),Medical Marijuana [place] Oil 2 milliLiter(s) 2 milliLiter(s) Enteral Tube <User Schedule>  Medical Marijuana Calm Oil 2 milliLiter(s),Medical [place] Calm Oil 2 milliLiter(s) 2 milliLiter(s) Enteral Tube <User Schedule>  Medical Marijuana [place] Oil 2 milliLiter(s),Medical Marijuana [place] Oil 2 milliLiter(s) 2 milliLiter(s) Enteral Tube <User Schedule>  metoprolol tartrate 12.5 milliGRAM(s) Oral two times a day  multivitamin/minerals/iron Oral Solution (CENTRUM) 15 milliLiter(s) Oral daily  QUEtiapine 25 milliGRAM(s) Oral at bedtime  sevelamer carbonate Powder 800 milliGRAM(s) Oral three times a day with meals  tiZANidine 4 milliGRAM(s) Oral <User Schedule>  zinc sulfate 220 milliGRAM(s) Oral daily    MEDICATIONS  (PRN):  acetaminophen  Suppository .. 650 milliGRAM(s) Rectal every 6 hours PRN Temp greater or equal to 38C (100.4F), Mild Pain (1 - 3)  carboxymethylcellulose 0.5% (Preservative-Free) Ophthalmic Solution 1 Drop(s) Both EYES three times a day PRN dry eyes  glucagon  Injectable 1 milliGRAM(s) IntraMuscular once PRN Glucose LESS THAN 70 milligrams/deciliter  nystatin Powder 1 Application(s) Topical three times a day PRN under breasts      Allergies  ASA; dye contrast (Anaphylaxis)  aspirin (Short breath)  divalproex sodium (Other (Unknown))  Flowers and Plants (Short breath)  Haldol (Other (Unknown))  penicillin (Short breath; Rash)  sulfa drugs (Short breath; Rash)  vancomycin (Rash; Urticaria; Hives)  Xanax (Other (Unknown))      Review of Systems:  unable to obtain from pt; history from chart review, family and discussion with team      Vital Signs Last 24 Hrs  T(C): 37.1 (30 Jul 2019 11:38), Max: 37.1 (30 Jul 2019 11:38)  T(F): 98.8 (30 Jul 2019 11:38), Max: 98.8 (30 Jul 2019 11:38)  HR: 105 (30 Jul 2019 11:38) (104 - 112)  BP: 109/70 (30 Jul 2019 11:38) (109/70 - 154/83)  BP(mean): --  RR: 18 (30 Jul 2019 11:38) (18 - 18)  SpO2: 96% (30 Jul 2019 11:38) (96% - 100%)    PHYSICAL EXAM:    Constitutional: frail elderly chronically ill appearing +severe contractures, non verbal  eyes open spouse at bedside +anasarca and muscle wasting  Neck: No JVD  Respiratory: + rhonchi , decreased BS bases  Cardiovascular: S1 and S2 regular  Gastrointestinal: BS+, soft, +G tube  high in epigastric area  bumper at 2cm richard   Extremities: anasarca/+edema and muscle wasting +dressings in place ++contractures  Vascular: 2+ peripheral pulses  Neurological: lethargic, no focal asymmetry  Psychiatric: non verbal   Skin: anicteric  +skin dressings and heel pad cushions in place  multiple decubiti (wound care following)      LABS:                        8.4    18.89 )-----------( 255      ( 29 Jul 2019 10:11 )             25.1     07-30    133<L>  |  89<L>  |  140<H>  ----------------------------<  131<H>  4.0   |  21<L>  |  3.49<H>    Ca    8.2<L>      30 Jul 2019 06:43  Phos  6.5     07-30  Mg     2.1     07-30    TPro  5.6<L>  /  Alb  2.8<L>  /  TBili  0.3  /  DBili  x   /  AST  9<L>  /  ALT  8<L>  /  AlkPhos  76  07-30        RADIOLOGY & ADDITIONAL TESTS:

## 2019-07-30 NOTE — PROGRESS NOTE ADULT - ASSESSMENT
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 Since admission, went to IR to have PEG replaced (6/25/19)    s/p RRT on 7/13 for hypotension in the SBP 50-60s and hypoxia in the 80s, admitted to MICU for shock requiring IV pressor support. Now stable off pressors, on NC. On stress steroids dosing  DVT on AC  s/p RRT for hypotension 7/21- 7/22 overnight  Anemia - without overt/brisk GI bleed.     RECS:  -keep G tube bumper at ~2-3 cm richard on G tube.  -G tube feeds as ordered.   -Renal following  -wound care  -ID following  -monitor BMs  -Renal following  -Continue Pepcid  -Monitor BMs  -epogen as per Renal    Poor prognosis  Consider Palliative input, family appears more amenable at this time      Adam Miller PA-C    [facility]  [phone]  After hours and weekend coverage [phone]

## 2019-07-31 LAB
ALBUMIN SERPL ELPH-MCNC: 2.5 G/DL — LOW (ref 3.3–5)
ALP SERPL-CCNC: 79 U/L — SIGNIFICANT CHANGE UP (ref 40–120)
ALT FLD-CCNC: 7 U/L — LOW (ref 10–45)
ANION GAP SERPL CALC-SCNC: 24 MMOL/L — HIGH (ref 5–17)
AST SERPL-CCNC: 12 U/L — SIGNIFICANT CHANGE UP (ref 10–40)
BASOPHILS # BLD AUTO: 0 K/UL — SIGNIFICANT CHANGE UP (ref 0–0.2)
BASOPHILS NFR BLD AUTO: 1 % — SIGNIFICANT CHANGE UP (ref 0–2)
BILIRUB SERPL-MCNC: 0.3 MG/DL — SIGNIFICANT CHANGE UP (ref 0.2–1.2)
BUN SERPL-MCNC: 139 MG/DL — HIGH (ref 7–23)
CALCIUM SERPL-MCNC: 8 MG/DL — LOW (ref 8.4–10.5)
CHLORIDE SERPL-SCNC: 87 MMOL/L — LOW (ref 96–108)
CO2 SERPL-SCNC: 19 MMOL/L — LOW (ref 22–31)
CREAT SERPL-MCNC: 3.5 MG/DL — HIGH (ref 0.5–1.3)
EOSINOPHIL # BLD AUTO: 0.2 K/UL — SIGNIFICANT CHANGE UP (ref 0–0.5)
EOSINOPHIL NFR BLD AUTO: 1 % — SIGNIFICANT CHANGE UP (ref 0–6)
GLUCOSE BLDC GLUCOMTR-MCNC: 111 MG/DL — HIGH (ref 70–99)
GLUCOSE BLDC GLUCOMTR-MCNC: 121 MG/DL — HIGH (ref 70–99)
GLUCOSE BLDC GLUCOMTR-MCNC: 129 MG/DL — HIGH (ref 70–99)
GLUCOSE BLDC GLUCOMTR-MCNC: 145 MG/DL — HIGH (ref 70–99)
GLUCOSE BLDC GLUCOMTR-MCNC: 157 MG/DL — HIGH (ref 70–99)
GLUCOSE SERPL-MCNC: 134 MG/DL — HIGH (ref 70–99)
HCT VFR BLD CALC: 25.3 % — LOW (ref 34.5–45)
HGB BLD-MCNC: 8.5 G/DL — LOW (ref 11.5–15.5)
LYMPHOCYTES # BLD AUTO: 0.5 K/UL — LOW (ref 1–3.3)
LYMPHOCYTES # BLD AUTO: 2 % — LOW (ref 13–44)
MCHC RBC-ENTMCNC: 29.8 PG — SIGNIFICANT CHANGE UP (ref 27–34)
MCHC RBC-ENTMCNC: 33.5 GM/DL — SIGNIFICANT CHANGE UP (ref 32–36)
MCV RBC AUTO: 89 FL — SIGNIFICANT CHANGE UP (ref 80–100)
MONOCYTES # BLD AUTO: 1.8 K/UL — HIGH (ref 0–0.9)
MONOCYTES NFR BLD AUTO: 14 % — SIGNIFICANT CHANGE UP (ref 2–14)
NEUTROPHILS # BLD AUTO: 15.4 K/UL — HIGH (ref 1.8–7.4)
NEUTROPHILS NFR BLD AUTO: 81 % — HIGH (ref 43–77)
PLATELET # BLD AUTO: 207 K/UL — SIGNIFICANT CHANGE UP (ref 150–400)
POTASSIUM SERPL-MCNC: 3.9 MMOL/L — SIGNIFICANT CHANGE UP (ref 3.5–5.3)
POTASSIUM SERPL-SCNC: 3.9 MMOL/L — SIGNIFICANT CHANGE UP (ref 3.5–5.3)
PROT SERPL-MCNC: 5.3 G/DL — LOW (ref 6–8.3)
RBC # BLD: 2.85 M/UL — LOW (ref 3.8–5.2)
RBC # FLD: 15.9 % — HIGH (ref 10.3–14.5)
SODIUM SERPL-SCNC: 130 MMOL/L — LOW (ref 135–145)
SURGICAL PATHOLOGY STUDY: SIGNIFICANT CHANGE UP
WBC # BLD: 17.9 K/UL — HIGH (ref 3.8–10.5)
WBC # FLD AUTO: 17.9 K/UL — HIGH (ref 3.8–10.5)

## 2019-07-31 PROCEDURE — 99231 SBSQ HOSP IP/OBS SF/LOW 25: CPT

## 2019-07-31 PROCEDURE — 99232 SBSQ HOSP IP/OBS MODERATE 35: CPT

## 2019-07-31 PROCEDURE — 71045 X-RAY EXAM CHEST 1 VIEW: CPT | Mod: 26

## 2019-07-31 RX ORDER — ACETYLCYSTEINE 200 MG/ML
5 VIAL (ML) MISCELLANEOUS THREE TIMES A DAY
Refills: 0 | Status: COMPLETED | OUTPATIENT
Start: 2019-07-31 | End: 2019-08-02

## 2019-07-31 RX ORDER — CALAMINE AND ZINC OXIDE AND PHENOL 160; 10 MG/ML; MG/ML
1 LOTION TOPICAL ONCE
Refills: 0 | Status: COMPLETED | OUTPATIENT
Start: 2019-07-31 | End: 2019-08-01

## 2019-07-31 RX ORDER — HYDROCORTISONE 1 %
1 OINTMENT (GRAM) TOPICAL ONCE
Refills: 0 | Status: DISCONTINUED | OUTPATIENT
Start: 2019-07-31 | End: 2019-08-09

## 2019-07-31 RX ORDER — FUROSEMIDE 40 MG
100 TABLET ORAL ONCE
Refills: 0 | Status: DISCONTINUED | OUTPATIENT
Start: 2019-07-31 | End: 2019-07-31

## 2019-07-31 RX ORDER — SODIUM CHLORIDE 9 MG/ML
1000 INJECTION INTRAMUSCULAR; INTRAVENOUS; SUBCUTANEOUS
Refills: 0 | Status: DISCONTINUED | OUTPATIENT
Start: 2019-07-31 | End: 2019-08-09

## 2019-07-31 RX ADMIN — Medication 300 MILLIGRAM(S): at 12:49

## 2019-07-31 RX ADMIN — TIZANIDINE 4 MILLIGRAM(S): 4 TABLET ORAL at 21:23

## 2019-07-31 RX ADMIN — SEVELAMER CARBONATE 800 MILLIGRAM(S): 2400 POWDER, FOR SUSPENSION ORAL at 17:43

## 2019-07-31 RX ADMIN — Medication 4 MILLILITER(S): at 22:39

## 2019-07-31 RX ADMIN — ZINC SULFATE TAB 220 MG (50 MG ZINC EQUIVALENT) 220 MILLIGRAM(S): 220 (50 ZN) TAB at 12:49

## 2019-07-31 RX ADMIN — Medication 1 APPLICATION(S): at 17:44

## 2019-07-31 RX ADMIN — Medication 1 APPLICATION(S): at 05:41

## 2019-07-31 RX ADMIN — SEVELAMER CARBONATE 800 MILLIGRAM(S): 2400 POWDER, FOR SUSPENSION ORAL at 10:26

## 2019-07-31 RX ADMIN — Medication 20 MICROGRAM(S): at 17:44

## 2019-07-31 RX ADMIN — Medication 1 MILLIGRAM(S): at 22:39

## 2019-07-31 RX ADMIN — Medication 3 MILLILITER(S): at 05:01

## 2019-07-31 RX ADMIN — Medication 1 DROP(S): at 12:48

## 2019-07-31 RX ADMIN — GABAPENTIN 300 MILLIGRAM(S): 400 CAPSULE ORAL at 12:49

## 2019-07-31 RX ADMIN — APIXABAN 2.5 MILLIGRAM(S): 2.5 TABLET, FILM COATED ORAL at 10:26

## 2019-07-31 RX ADMIN — APIXABAN 2.5 MILLIGRAM(S): 2.5 TABLET, FILM COATED ORAL at 17:43

## 2019-07-31 RX ADMIN — Medication 3 MILLILITER(S): at 17:42

## 2019-07-31 RX ADMIN — CHLORHEXIDINE GLUCONATE 1 APPLICATION(S): 213 SOLUTION TOPICAL at 12:49

## 2019-07-31 RX ADMIN — Medication 12.5 MILLIGRAM(S): at 10:26

## 2019-07-31 RX ADMIN — Medication 12.5 MILLIGRAM(S): at 17:45

## 2019-07-31 RX ADMIN — Medication 1 DROP(S): at 17:43

## 2019-07-31 RX ADMIN — SEVELAMER CARBONATE 800 MILLIGRAM(S): 2400 POWDER, FOR SUSPENSION ORAL at 12:49

## 2019-07-31 RX ADMIN — Medication 40 MILLIGRAM(S): at 15:56

## 2019-07-31 RX ADMIN — Medication 60 MICROGRAM(S): at 22:39

## 2019-07-31 RX ADMIN — Medication 1 APPLICATION(S): at 05:42

## 2019-07-31 RX ADMIN — QUETIAPINE FUMARATE 25 MILLIGRAM(S): 200 TABLET, FILM COATED ORAL at 22:39

## 2019-07-31 RX ADMIN — Medication 650 MILLIGRAM(S): at 05:30

## 2019-07-31 RX ADMIN — Medication 3 MILLILITER(S): at 12:51

## 2019-07-31 RX ADMIN — Medication 1: at 07:06

## 2019-07-31 RX ADMIN — Medication 650 MILLIGRAM(S): at 21:23

## 2019-07-31 RX ADMIN — Medication 20 MICROGRAM(S): at 05:38

## 2019-07-31 RX ADMIN — SODIUM CHLORIDE 50 MILLILITER(S): 9 INJECTION INTRAMUSCULAR; INTRAVENOUS; SUBCUTANEOUS at 11:30

## 2019-07-31 RX ADMIN — FAMOTIDINE 20 MILLIGRAM(S): 10 INJECTION INTRAVENOUS at 10:27

## 2019-07-31 RX ADMIN — TIZANIDINE 4 MILLIGRAM(S): 4 TABLET ORAL at 10:27

## 2019-07-31 RX ADMIN — Medication 15 MILLILITER(S): at 12:49

## 2019-07-31 NOTE — CHART NOTE - NSCHARTNOTEFT_GEN_A_CORE
MEDICINE PA    CHIEF COMPLAINT   Patient is a 69y old  Female who presents with a chief complaint of PEG tube dislodgement, fever, leukocytosis (31 Jul 2019 19:54)      EVENT SUMMARY  Notified by RN for fever of 100.4. Pt seen and examined at bedside along with . Pt is non verbal and contracted;  states pt is comfortable at this time; Denies any chills, chest pain, palpitations, SOB, abdominal pain, N/V/C/D, headache, urinary symptoms, cough.     Vital Signs Last 24 Hrs  T(C):  38 (31 Jul 2019 21:21)  T(F): 100.4 (31 Jul 2019 21:21)  HR: 101 (01 Aug 2019 00:25) (85 - 106)  BP: 104/60 (01 Aug 2019 00:25) (103/63 - 126/85)  BP(mean): --  RR: 18 (01 Aug 2019 00:25) (17 - 20)  SpO2: 100% (01 Aug 2019 00:25) (98% - 100%)    PHYSICAL EXAM:   Constitutional: lethargic  Respiratory: rhonchi  Cardiovascular: S1 S2. No murmurs.  Gastrointestinal: BS X4 active. soft. nontender. PEG site with oozing   Extremities/Vascular: +2 pulses bilaterally. +2 BLE edema. contracted                           8.5    17.9  )-----------( 207      ( 31 Jul 2019 07:08 )             25.3     07-31    130<L>  |  87<L>  |  139<H>  ----------------------------<  134<H>  3.9   |  19<L>  |  3.50<H>    Ca    8.0<L>      31 Jul 2019 07:08  Phos  6.5     07-30  Mg     2.1     07-30    TPro  5.3<L>  /  Alb  2.5<L>  /  TBili  0.3  /  DBili  x   /  AST  12  /  ALT  7<L>  /  AlkPhos  79  07-31    .Urine  07-13-19   >=3 organisms. Probable collection contamination.  --  --      .Blood  07-12-19   No growth at 5 days.  --  --      .Blood  07-12-19   No growth at 5 days.  --  --      .Blood  07-05-19   No growth at 5 days.  --  --      .Blood  07-05-19   No growth at 5 days.  --  --    Urine Microscopic-Add On (NC) (07.13.19 @ 05:37)    Epithelial Cells: 0    White Blood Cell - Urine: >50 /HPF    Red Blood Cell - Urine: 10-25 /hpf    Bacteria: Moderate    IMAGING  < from: Xray Chest 1 View- PORTABLE-Urgent (07.31.19 @ 05:45) >    IMPRESSION:   Low lung volume.  Left pleural effusion.    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 Hospital course c/b RRT on 7/13 for hypotension in the SBP 50-60s and hypoxia in the 80s, admitted to MICU for shock requiring IV pressor support now stable. Now p/w fever    - s/p meropenem for asp pna; will continue to monitor off abx.   - Continue IVF for hydration   - Continue antipyretic   - Cooling measures  - F/u Blood cx x2 and UA   - F/u ID in AM   - Will continue to monitor  F/U with primary team in am    Joycelyn PINTO   Department of Medicine

## 2019-07-31 NOTE — PROGRESS NOTE ADULT - ASSESSMENT
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 Since admission, went to IR to have PEG replaced (6/25/19)    s/p RRT on 7/13 for hypotension in the SBP 50-60s and hypoxia in the 80s, admitted to MICU for shock requiring IV pressor support. Now stable off pressors, on NC. On stress steroids dosing  DVT on AC  s/p RRT for hypotension 7/21- 7/22 overnight  Anemia - without overt/brisk GI bleed.     RECS:  -keep G tube bumper at ~2 cm richard on G tube.  local G tube care reviewed with pt and RN  -G tube feeds as ordered.   -Renal following  -wound care  -ID following  -monitor BMs  -Renal following  -Continue Pepcid  -Monitor BMs  -epogen as per Renal  -fever work-up per primary team    Overall Poor prognosis    Adam Miller PA-C    [facility]  [phone]  After hours and weekend coverage [phone]

## 2019-07-31 NOTE — CHART NOTE - NSCHARTNOTEFT_GEN_A_CORE
MEDICINE NP    Notified by RN patient with fever 101.8. Seen and examined patient at bedside. Patient is at baseline; nonverbal. Appears in NAD; unable to assess ROS.  at bedside and demands GI doctor to see patient now for leaking PEG tube.    VITAL SIGNS:  Vital Signs Last 24 Hrs  T(C): 38.8 (31 Jul 2019 04:59), Max: 38.8 (31 Jul 2019 04:59)  T(F): 101.8 (31 Jul 2019 04:59), Max: 101.8 (31 Jul 2019 04:59)  HR: 116 (31 Jul 2019 04:59) (105 - 118)  BP: 129/67 (31 Jul 2019 04:59) (109/70 - 144/70)  BP(mean): --  RR: 20 (31 Jul 2019 04:59) (18 - 20)  SpO2: 96% (31 Jul 2019 04:59) (96% - 100%)      LABORATORY:                          8.4    18.89 )-----------( 255      ( 29 Jul 2019 10:11 )             25.1       07-30    133<L>  |  89<L>  |  140<H>  ----------------------------<  131<H>  4.0   |  21<L>  |  3.49<H>    Ca    8.2<L>      30 Jul 2019 06:43  Phos  6.5     07-30  Mg     2.1     07-30    TPro  5.6<L>  /  Alb  2.8<L>  /  TBili  0.3  /  DBili  x   /  AST  9<L>  /  ALT  8<L>  /  AlkPhos  76  07-30    MICROBIOLOGY:   Culture - Blood (07.12.19 @ 18:59)    Specimen Source: .Blood    Culture Results: No growth at 5 days.  Culture - Blood (07.12.19 @ 18:59)    Specimen Source: .Blood    Culture Results: No growth at 5 days.  Culture - Blood (07.12.19 @ 16:59)    Specimen Source: .Blood    Culture Results: No growth at 5 days.    RADIOLOGY:  7/23/19 CXR: Impression:  Left pleural effusion.    PHYSICAL EXAM:  Constitutional: RUBEN, lethargic  Respiratory: rhonchi  Cardiovascular: S1 S2. No murmurs.  Gastrointestinal: BS X4 active. soft. nontender. PEG site with oozing   Extremities/Vascular: +2 pulses bilaterally. +2 BLE edema.    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 Hospital course c/b RRT on 7/13 for hypotension in the SBP 50-60s and hypoxia in the 80s, admitted to MICU for shock requiring IV pressor support now stable. Now p/w fever and nonbloody, ?bilious drainage from PEG site this AM.     Fever  --Tylenol and cooling measures prn for pyrexia  --BC x2, UA/UC  --CXR  --F/U primary team in AM    ?PEG malfunction   --D/W on call GI Dr. Rizvi was told on call service for emergent issues only and to notify primary GI.     Pat Eldridge, [facility]  [id number] MEDICINE NP    Notified by RN patient with fever 101.8. Seen and examined patient at bedside. Patient is at baseline; nonverbal. Appears in NAD; unable to assess ROS.  at bedside and demands GI doctor to see patient now for leaking PEG tube.    VITAL SIGNS:  Vital Signs Last 24 Hrs  T(C): 38.8 (31 Jul 2019 04:59), Max: 38.8 (31 Jul 2019 04:59)  T(F): 101.8 (31 Jul 2019 04:59), Max: 101.8 (31 Jul 2019 04:59)  HR: 116 (31 Jul 2019 04:59) (105 - 118)  BP: 129/67 (31 Jul 2019 04:59) (109/70 - 144/70)  BP(mean): --  RR: 20 (31 Jul 2019 04:59) (18 - 20)  SpO2: 96% (31 Jul 2019 04:59) (96% - 100%)      LABORATORY:                          8.4    18.89 )-----------( 255      ( 29 Jul 2019 10:11 )             25.1       07-30    133<L>  |  89<L>  |  140<H>  ----------------------------<  131<H>  4.0   |  21<L>  |  3.49<H>    Ca    8.2<L>      30 Jul 2019 06:43  Phos  6.5     07-30  Mg     2.1     07-30    TPro  5.6<L>  /  Alb  2.8<L>  /  TBili  0.3  /  DBili  x   /  AST  9<L>  /  ALT  8<L>  /  AlkPhos  76  07-30    MICROBIOLOGY:   Culture - Blood (07.12.19 @ 18:59)    Specimen Source: .Blood    Culture Results: No growth at 5 days.  Culture - Blood (07.12.19 @ 18:59)    Specimen Source: .Blood    Culture Results: No growth at 5 days.  Culture - Blood (07.12.19 @ 16:59)    Specimen Source: .Blood    Culture Results: No growth at 5 days.    RADIOLOGY:  7/23/19 CXR: Impression:  Left pleural effusion.    PHYSICAL EXAM:  Constitutional: RUBEN, lethargic  Respiratory: rhonchi  Cardiovascular: S1 S2. No murmurs.  Gastrointestinal: BS X4 active. soft. nontender. PEG site with oozing   Extremities/Vascular: +2 pulses bilaterally. +2 BLE edema.    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 Hospital course c/b RRT on 7/13 for hypotension in the SBP 50-60s and hypoxia in the 80s, admitted to MICU for shock requiring IV pressor support now stable. Now p/w fever and nonbloody, ?bilious drainage from PEG site this AM.     Fever  --Tylenol and cooling measures prn for pyrexia  --BC x2, UA/UC  --CXR  --F/U primary team in AM    ?PEG malfunction   --D/W on call GI Dr. Elmore was told on call service for emergent issues only and to notify primary GI.     Pat Eldridge, [facility]  [id number] MEDICINE NP    Notified by RN patient with fever 101.8. Seen and examined patient at bedside. Patient is at baseline; nonverbal. Appears in NAD; unable to assess ROS.  at bedside and demands GI doctor to see patient now for leaking PEG tube.    VITAL SIGNS:  Vital Signs Last 24 Hrs  T(C): 38.8 (31 Jul 2019 04:59), Max: 38.8 (31 Jul 2019 04:59)  T(F): 101.8 (31 Jul 2019 04:59), Max: 101.8 (31 Jul 2019 04:59)  HR: 116 (31 Jul 2019 04:59) (105 - 118)  BP: 129/67 (31 Jul 2019 04:59) (109/70 - 144/70)  BP(mean): --  RR: 20 (31 Jul 2019 04:59) (18 - 20)  SpO2: 96% (31 Jul 2019 04:59) (96% - 100%)      LABORATORY:                          8.4    18.89 )-----------( 255      ( 29 Jul 2019 10:11 )             25.1       07-30    133<L>  |  89<L>  |  140<H>  ----------------------------<  131<H>  4.0   |  21<L>  |  3.49<H>    Ca    8.2<L>      30 Jul 2019 06:43  Phos  6.5     07-30  Mg     2.1     07-30    TPro  5.6<L>  /  Alb  2.8<L>  /  TBili  0.3  /  DBili  x   /  AST  9<L>  /  ALT  8<L>  /  AlkPhos  76  07-30    MICROBIOLOGY:   Culture - Blood (07.12.19 @ 18:59)    Specimen Source: .Blood    Culture Results: No growth at 5 days.  Culture - Blood (07.12.19 @ 18:59)    Specimen Source: .Blood    Culture Results: No growth at 5 days.  Culture - Blood (07.12.19 @ 16:59)    Specimen Source: .Blood    Culture Results: No growth at 5 days.    RADIOLOGY:  7/23/19 CXR: Impression:  Left pleural effusion.    PHYSICAL EXAM:  Constitutional: RUBEN, lethargic  Respiratory: rhonchi  Cardiovascular: S1 S2. No murmurs.  Gastrointestinal: BS X4 active. soft. nontender. PEG site with oozing   Extremities/Vascular: +2 pulses bilaterally. +2 BLE edema.    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 Hospital course c/b RRT on 7/13 for hypotension in the SBP 50-60s and hypoxia in the 80s, admitted to MICU for shock requiring IV pressor support now stable. Now p/w fever and nonbloody, ?bilious drainage from PEG site this AM.     Fever  --Tylenol and cooling measures prn for pyrexia  --BC x2, UA/UC  --CXR  --F/U primary team in AM    ?PEG malfunction   --D/W on call GI Dr. Elmore was told on call service for emergent issues only and to notify primary GI.   --Called primary GI ([place]) [phone]. Hours are 9am-4pm. Primary team to follow up.    Pat Eldridge, [facility]  [id number]

## 2019-07-31 NOTE — PROGRESS NOTE ADULT - ASSESSMENT
Patient is 69 year-old woman with advanced dementia and functional quadriplegia presents with fevers, leukocytosis in the setting of PEG dislodgement. Patient with acute kidney injury that is likely multifactorial.   Antibiotics per ID.  PEG management and feeds per GI/nutrition.    Appreciate nephrology input: currently off Lasix gtt. Agree with IV fluids as tolerated after fever.    Trend daily labs. Avoid nephrotoxic agents.     Overall prognosis remains grave.

## 2019-07-31 NOTE — PROGRESS NOTE ADULT - SUBJECTIVE AND OBJECTIVE BOX
NEPHROLOGY    Patient seen and examined.  Fevers overnight  Leaking around PEG    MEDICATIONS  (STANDING):  ALBUTerol/ipratropium for Nebulization 3 milliLiter(s) Nebulizer every 6 hours  apixaban 2.5 milliGRAM(s) Oral two times a day  artificial tears (preservative free) Ophthalmic Solution 1 Drop(s) Both EYES four times a day  BACItracin   Ointment 1 Application(s) Topical two times a day  chlorhexidine 2% Cloths 1 Application(s) Topical daily  clonazePAM  Tablet 1 milliGRAM(s) Oral at bedtime  Dakins Solution - 1/4 Strength 1 Application(s) Topical two times a day  dextrose 5%. 1000 milliLiter(s) (50 mL/Hr) IV Continuous <Continuous>  famotidine    Tablet 20 milliGRAM(s) Oral every 48 hours  ferrous    sulfate Liquid 300 milliGRAM(s) Enteral Tube daily  formoterol for nebulization 20 MICROGram(s) Nebulizer two times a day  gabapentin   Solution 300 milliGRAM(s) Oral daily  insulin lispro (HumaLOG) corrective regimen sliding scale   SubCutaneous every 6 hours  levothyroxine Injectable 60 MICROGram(s) IV Push at bedtime  Medical Marijuana Awake Oil 2 milliLiter(s),Medical Marijuana Awake Oil 2 milliLiter(s) 2 milliLiter(s) Enteral Tube <User Schedule>  Medical Marijuana Calm Oil 2 milliLiter(s),Medical [place] Calm Oil 2 milliLiter(s) 2 milliLiter(s) Enteral Tube <User Schedule>  Medical Marijuana [place] Oil 2 milliLiter(s),Medical Marijuana [place] Oil 2 milliLiter(s) 2 milliLiter(s) Enteral Tube <User Schedule>  metoprolol tartrate 12.5 milliGRAM(s) Oral two times a day  multivitamin/minerals/iron Oral Solution (CENTRUM) 15 milliLiter(s) Oral daily  QUEtiapine 25 milliGRAM(s) Oral at bedtime  sevelamer carbonate Powder 800 milliGRAM(s) Oral three times a day with meals  tiZANidine 4 milliGRAM(s) Oral <User Schedule>  zinc sulfate 220 milliGRAM(s) Oral daily    VITALS:  T(C): , Max: 38.8 (07-31-19 @ 04:59)  T(F): , Max: 101.8 (07-31-19 @ 04:59)  HR: 98 (07-31-19 @ 10:00)  BP: 122/75 (07-31-19 @ 10:00)  RR: 20 (07-31-19 @ 10:00)  SpO2: 98% (07-31-19 @ 10:00)    07-30 @ 07:01  -  07-31 @ 07:00  --------------------------------------------------------  IN: 1045 mL / OUT: 350 mL / NET: 695 mL    PHYSICAL EXAM:  Constitutional: frail, cachectic  Respiratory: diminished B/L  Cardiovascular: S1 and S2  Gastrointestinal: + BS, soft, + PEG  Extremities: + edema, contracted  Neurological: non-communicative   : + gavin    LABS:                        8.5    17.9  )-----------( 207      ( 31 Jul 2019 07:08 )             25.3     07-31    130<L>  |  87<L>  |  139<H>  ----------------------------<  134<H>  3.9   |  19<L>  |  3.50<H>    Ca    8.0<L>      31 Jul 2019 07:08  Phos  6.5     07-30  Mg     2.1     07-30    TPro  5.3<L>  /  Alb  2.5<L>  /  TBili  0.3  /  DBili  x   /  AST  12  /  ALT  7<L>  /  AlkPhos  79  07-31    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, left knee fracture, DM, RLE DVT, p/w dislodged PEG tube s/p replacement of PEG by IR c/b TRANG   - TRANG: multifactorial with solitary kidney - likely ATN - non-oliguric   - metabolic acidosis with high anion gap: from TRANG + sepsis - stable  - hyponatremia: due to hypervolemia and TRANG - declining   - hypervolemic on exam with LE edema and JVD  - anemia: of chronic dz - hgb stable   - hyperphosphatemia: due to CKD/TRANG - on binders    RECOMMEND:  - palliative care consultation to determine GOC  - consider add'l furosemide 60 mg IV x 1   - continue TF with nepro as able via PEG per GI  - continue Renvela powder 800 mg TID via PEG  - maintain chronic gavin catheter   - dose medication for a GFR ~ 10-15 mL/min    Laura Farr, NP  [facility]  [phone] NEPHROLOGY    Patient seen and examined.  Fevers overnight  Leaking around PEG    MEDICATIONS  (STANDING):  ALBUTerol/ipratropium for Nebulization 3 milliLiter(s) Nebulizer every 6 hours  apixaban 2.5 milliGRAM(s) Oral two times a day  artificial tears (preservative free) Ophthalmic Solution 1 Drop(s) Both EYES four times a day  BACItracin   Ointment 1 Application(s) Topical two times a day  chlorhexidine 2% Cloths 1 Application(s) Topical daily  clonazePAM  Tablet 1 milliGRAM(s) Oral at bedtime  Dakins Solution - 1/4 Strength 1 Application(s) Topical two times a day  dextrose 5%. 1000 milliLiter(s) (50 mL/Hr) IV Continuous <Continuous>  famotidine    Tablet 20 milliGRAM(s) Oral every 48 hours  ferrous    sulfate Liquid 300 milliGRAM(s) Enteral Tube daily  formoterol for nebulization 20 MICROGram(s) Nebulizer two times a day  gabapentin   Solution 300 milliGRAM(s) Oral daily  insulin lispro (HumaLOG) corrective regimen sliding scale   SubCutaneous every 6 hours  levothyroxine Injectable 60 MICROGram(s) IV Push at bedtime  Medical Marijuana Awake Oil 2 milliLiter(s),Medical Marijuana Awake Oil 2 milliLiter(s) 2 milliLiter(s) Enteral Tube <User Schedule>  Medical Marijuana Calm Oil 2 milliLiter(s),Medical Marinjuana Calm Oil 2 milliLiter(s) 2 milliLiter(s) Enteral Tube <User Schedule>  Medical Marijuana [place] Oil 2 milliLiter(s),Medical Marijuana [place] Oil 2 milliLiter(s) 2 milliLiter(s) Enteral Tube <User Schedule>  metoprolol tartrate 12.5 milliGRAM(s) Oral two times a day  multivitamin/minerals/iron Oral Solution (CENTRUM) 15 milliLiter(s) Oral daily  QUEtiapine 25 milliGRAM(s) Oral at bedtime  sevelamer carbonate Powder 800 milliGRAM(s) Oral three times a day with meals  tiZANidine 4 milliGRAM(s) Oral <User Schedule>  zinc sulfate 220 milliGRAM(s) Oral daily    VITALS:  T(C): , Max: 38.8 (07-31-19 @ 04:59)  T(F): , Max: 101.8 (07-31-19 @ 04:59)  HR: 98 (07-31-19 @ 10:00)  BP: 122/75 (07-31-19 @ 10:00)  RR: 20 (07-31-19 @ 10:00)  SpO2: 98% (07-31-19 @ 10:00)    07-30 @ 07:01  -  07-31 @ 07:00  --------------------------------------------------------  IN: 1045 mL / OUT: 350 mL / NET: 695 mL    PHYSICAL EXAM:  Constitutional: frail, cachectic  Respiratory: diminished B/L  Cardiovascular: S1 and S2  Gastrointestinal: + BS, soft, + PEG  Extremities: + edema, contracted  Neurological: non-communicative   : + gavin    LABS:                        8.5    17.9  )-----------( 207      ( 31 Jul 2019 07:08 )             25.3     07-31    130<L>  |  87<L>  |  139<H>  ----------------------------<  134<H>  3.9   |  19<L>  |  3.50<H>    Ca    8.0<L>      31 Jul 2019 07:08  Phos  6.5     07-30  Mg     2.1     07-30    TPro  5.3<L>  /  Alb  2.5<L>  /  TBili  0.3  /  DBili  x   /  AST  12  /  ALT  7<L>  /  AlkPhos  79  07-31    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, left knee fracture, DM, RLE DVT, p/w dislodged PEG tube s/p replacement of PEG by IR c/b TRANG   - TRANG: multifactorial with solitary kidney - likely ATN - non-oliguric   - metabolic acidosis with high anion gap: from TRANG + sepsis - stable  - hyponatremia: due to hypervolemia and TRANG - declining   - hypervolemic on exam with LE edema and JVD  - anemia: of chronic dz - hgb stable   - hyperphosphatemia: due to CKD/TRANG - on binders    RECOMMEND:  - palliative care consultation to determine GOC  - give furosemide 100 mg IVPB  x 1 dose  - monitor strict IOs  - continue TF with nepro as able via PEG per GI  - continue Renvela powder 800 mg TID via PEG  - maintain chronic gavin catheter   - dose medication for a GFR ~ 10-15 mL/min    Laura Farr, NP  [facility]  [phone] NEPHROLOGY    Patient seen and examined.  Fevers overnight  Leaking around PEG    MEDICATIONS  (STANDING):  ALBUTerol/ipratropium for Nebulization 3 milliLiter(s) Nebulizer every 6 hours  apixaban 2.5 milliGRAM(s) Oral two times a day  artificial tears (preservative free) Ophthalmic Solution 1 Drop(s) Both EYES four times a day  BACItracin   Ointment 1 Application(s) Topical two times a day  chlorhexidine 2% Cloths 1 Application(s) Topical daily  clonazePAM  Tablet 1 milliGRAM(s) Oral at bedtime  Dakins Solution - 1/4 Strength 1 Application(s) Topical two times a day  dextrose 5%. 1000 milliLiter(s) (50 mL/Hr) IV Continuous <Continuous>  famotidine    Tablet 20 milliGRAM(s) Oral every 48 hours  ferrous    sulfate Liquid 300 milliGRAM(s) Enteral Tube daily  formoterol for nebulization 20 MICROGram(s) Nebulizer two times a day  gabapentin   Solution 300 milliGRAM(s) Oral daily  insulin lispro (HumaLOG) corrective regimen sliding scale   SubCutaneous every 6 hours  levothyroxine Injectable 60 MICROGram(s) IV Push at bedtime  Medical Marijuana Awake Oil 2 milliLiter(s),Medical Marijuana Awake Oil 2 milliLiter(s) 2 milliLiter(s) Enteral Tube <User Schedule>  Medical Marijuana Calm Oil 2 milliLiter(s),Medical Marinjuana Calm Oil 2 milliLiter(s) 2 milliLiter(s) Enteral Tube <User Schedule>  Medical Marijuana [place] Oil 2 milliLiter(s),Medical Marijuana [place] Oil 2 milliLiter(s) 2 milliLiter(s) Enteral Tube <User Schedule>  metoprolol tartrate 12.5 milliGRAM(s) Oral two times a day  multivitamin/minerals/iron Oral Solution (CENTRUM) 15 milliLiter(s) Oral daily  QUEtiapine 25 milliGRAM(s) Oral at bedtime  sevelamer carbonate Powder 800 milliGRAM(s) Oral three times a day with meals  tiZANidine 4 milliGRAM(s) Oral <User Schedule>  zinc sulfate 220 milliGRAM(s) Oral daily    VITALS:  T(C): , Max: 38.8 (07-31-19 @ 04:59)  T(F): , Max: 101.8 (07-31-19 @ 04:59)  HR: 98 (07-31-19 @ 10:00)  BP: 122/75 (07-31-19 @ 10:00)  RR: 20 (07-31-19 @ 10:00)  SpO2: 98% (07-31-19 @ 10:00)    07-30 @ 07:01  -  07-31 @ 07:00  --------------------------------------------------------  IN: 1045 mL / OUT: 350 mL / NET: 695 mL    PHYSICAL EXAM:  Constitutional: frail, cachectic  Respiratory: diminished B/L  Cardiovascular: S1 and S2  Gastrointestinal: + BS, soft, + PEG  Extremities: + edema, contracted  Neurological: non-communicative   : + gavin    LABS:                        8.5    17.9  )-----------( 207      ( 31 Jul 2019 07:08 )             25.3     07-31    130<L>  |  87<L>  |  139<H>  ----------------------------<  134<H>  3.9   |  19<L>  |  3.50<H>    Ca    8.0<L>      31 Jul 2019 07:08  Phos  6.5     07-30  Mg     2.1     07-30    TPro  5.3<L>  /  Alb  2.5<L>  /  TBili  0.3  /  DBili  x   /  AST  12  /  ALT  7<L>  /  AlkPhos  79  07-31    ASSESSMENT

## 2019-07-31 NOTE — PROGRESS NOTE ADULT - ASSESSMENT
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, left knee fracture, DM, RLE DVT, p/w dislodged PEG tube s/p replacement of PEG by IR c/b TRANG   - TRANG: multifactorial with solitary kidney - likely ATN - non-oliguric   - metabolic acidosis with high anion gap: from TRANG + sepsis - stable  - hyponatremia: due to hypervolemia and TRANG - declining   - hypervolemic on exam with LE edema and JVD  - anemia: of chronic dz - hgb stable   - hyperphosphatemia: due to CKD/TRANG - on binders    RECOMMEND:  - palliative care consultation to determine GOC  - monitor strict IOs  - continue TF with nepro as able via PEG per GI  - continue Renvela powder 800 mg TID via PEG  - maintain chronic gavin catheter   - dose medication for a GFR ~ 10-15 mL/min    Laura Farr, NP  [facility]  [phone]

## 2019-07-31 NOTE — PROGRESS NOTE ADULT - SUBJECTIVE AND OBJECTIVE BOX
Patient is a 69y old  Female who presented with a chief complaint of PEG tube dislodgement, fever, leukocytosis (31 Jul 2019 10:04)      INTERVAL HPI/OVERNIGHT EVENTS:    overnight with leaking from PEG site; feeds held  some skin breakdown at martin PEG site, and skin irritation from macerated skin    +fever  +stools - brown, no diarrhea/watery stool      MEDICATIONS  (STANDING):  ALBUTerol/ipratropium for Nebulization 3 milliLiter(s) Nebulizer every 6 hours  apixaban 2.5 milliGRAM(s) Oral two times a day  artificial tears (preservative free) Ophthalmic Solution 1 Drop(s) Both EYES four times a day  BACItracin   Ointment 1 Application(s) Topical two times a day  chlorhexidine 2% Cloths 1 Application(s) Topical daily  clonazePAM  Tablet 1 milliGRAM(s) Oral at bedtime  Dakins Solution - 1/4 Strength 1 Application(s) Topical two times a day  dextrose 5%. 1000 milliLiter(s) (50 mL/Hr) IV Continuous <Continuous>  famotidine    Tablet 20 milliGRAM(s) Oral every 48 hours  ferrous    sulfate Liquid 300 milliGRAM(s) Enteral Tube daily  formoterol for nebulization 20 MICROGram(s) Nebulizer two times a day  gabapentin   Solution 300 milliGRAM(s) Oral daily  insulin lispro (HumaLOG) corrective regimen sliding scale   SubCutaneous every 6 hours  levothyroxine Injectable 60 MICROGram(s) IV Push at bedtime  Medical Marijuana Awake Oil 2 milliLiter(s),Medical Marijuana Awake Oil 2 milliLiter(s) 2 milliLiter(s) Enteral Tube <User Schedule>  Medical Marijuana Calm Oil 2 milliLiter(s),Medical Marinjuana Calm Oil 2 milliLiter(s) 2 milliLiter(s) Enteral Tube <User Schedule>  Medical Marijuana [place] Oil 2 milliLiter(s),Medical Marijuana [place] Oil 2 milliLiter(s) 2 milliLiter(s) Enteral Tube <User Schedule>  metoprolol tartrate 12.5 milliGRAM(s) Oral two times a day  multivitamin/minerals/iron Oral Solution (CENTRUM) 15 milliLiter(s) Oral daily  QUEtiapine 25 milliGRAM(s) Oral at bedtime  sevelamer carbonate Powder 800 milliGRAM(s) Oral three times a day with meals  sodium chloride 0.9%. 1000 milliLiter(s) (50 mL/Hr) IV Continuous <Continuous>  tiZANidine 4 milliGRAM(s) Oral <User Schedule>  zinc sulfate 220 milliGRAM(s) Oral daily    MEDICATIONS  (PRN):  acetaminophen  Suppository .. 650 milliGRAM(s) Rectal every 6 hours PRN Temp greater or equal to 38C (100.4F), Mild Pain (1 - 3)  carboxymethylcellulose 0.5% (Preservative-Free) Ophthalmic Solution 1 Drop(s) Both EYES three times a day PRN dry eyes  glucagon  Injectable 1 milliGRAM(s) IntraMuscular once PRN Glucose LESS THAN 70 milligrams/deciliter  nystatin Powder 1 Application(s) Topical three times a day PRN under breasts      Allergies  ASA; dye contrast (Anaphylaxis)  aspirin (Short breath)  divalproex sodium (Other (Unknown))  Flowers and Plants (Short breath)  Haldol (Other (Unknown))  penicillin (Short breath; Rash)  sulfa drugs (Short breath; Rash)  vancomycin (Rash; Urticaria; Hives)  Xanax (Other (Unknown))    Review of Systems:  unable to obtain from pt    Vital Signs Last 24 Hrs  T(C): 36.7 (31 Jul 2019 10:00), Max: 38.8 (31 Jul 2019 04:59)  T(F): 98 (31 Jul 2019 10:00), Max: 101.8 (31 Jul 2019 04:59)  HR: 98 (31 Jul 2019 10:00) (98 - 118)  BP: 122/75 (31 Jul 2019 10:00) (109/70 - 144/70)  BP(mean): --  RR: 20 (31 Jul 2019 10:00) (18 - 20)  SpO2: 98% (31 Jul 2019 10:00) (96% - 100%)    PHYSICAL EXAM:  Constitutional: frail elderly chronically ill appearing +severe contractures, non verbal  eyes open spouse at bedside +anasarca and muscle wasting  Neck: No JVD  Respiratory: + rhonchi , decreased BS bases  Cardiovascular: S1 and S2 regular  Gastrointestinal: BS+, soft, +G tube  high in epigastric area  bumper at 6-8 cm richard with leakage at stoma - cleaned and patted dry, Cavilon applied. noted areas of skin breakdown with minimal bleeding. bumper cinched down to 2 cm richard, 1 piece of drain sponge applied between skin and PEG bumper to prevent further trauma/ breakdown. no further leakage.  2 pieces of steristrip taping applied around tube just above bumper to help prevent further sliding up of bumper allowing for recurrent leakage at G tube site  tube flushed with water and no leakage at stoma/dressing  Extremities: anasarca/+edema and muscle wasting +dressings in place ++contractures  Vascular: 2+ peripheral pulses  Neurological: lethargic, no focal asymmetry  Psychiatric: non verbal   Skin: anicteric  +skin dressings and heel pad cushions in place  multiple decubiti (wound care following)    LABS:                        8.5    17.9  )-----------( 207      ( 31 Jul 2019 07:08 )             25.3     07-31    130<L>  |  87<L>  |  139<H>  ----------------------------<  134<H>  3.9   |  19<L>  |  3.50<H>    Ca    8.0<L>      31 Jul 2019 07:08  Phos  6.5     07-30  Mg     2.1     07-30    TPro  5.3<L>  /  Alb  2.5<L>  /  TBili  0.3  /  DBili  x   /  AST  12  /  ALT  7<L>  /  AlkPhos  79  07-31        LIVER FUNCTIONS - ( 31 Jul 2019 07:08 )  Alb: 2.5 g/dL / Pro: 5.3 g/dL / ALK PHOS: 79 U/L / ALT: 7 U/L / AST: 12 U/L / GGT: x             RADIOLOGY & ADDITIONAL TESTS:

## 2019-07-31 NOTE — PROGRESS NOTE ADULT - SUBJECTIVE AND OBJECTIVE BOX
---___---___---___---___---___---___ ---___---___---___---___---___---___---___---___---                  M E D I C A L   A T T E N D I N G   P R O G R E S S   N O T E  ---___---___---___---___---___---___ ---___---___---___---___---___---___---___---___---        ================================================    ++CHIEF COMPLAINT:   Patient is a 69y old  Female who presents with a chief complaint of PEG tube dislodgement, fever, leukocytosis (2019 11:59)      Gastrostomy malfunction trang and now fever     ---___---___---___---___---___---  PAST MEDICAL / Surgical  HISTORY:  PAST MEDICAL & SURGICAL HISTORY:  Type 2 diabetes mellitus  Dementia  DVT, lower extremity  CVA (cerebral vascular accident)  Fatty pancreas  PNA (pneumonia)  Pulmonary HTN  IGT (impaired glucose tolerance)  Ulcerative colitis  Acid reflux  Anxiety  Depression  Mouth sores  HLD (hyperlipidemia)  Asthma  S/P percutaneous endoscopic gastrostomy (PEG) tube placement: for dysphagia  Humeral head fracture  H/O: hysterectomy  H/O cataract extraction, left  History of knee replacement      ---___---___---___---___---___---  FAMILY HISTORY:   FAMILY HISTORY:  Family history of dementia (Grandparent)  Family history of colon cancer (Grandparent)        ---___---___---___---___---___---  ALLERGIES:   Allergies    ASA; dye contrast (Anaphylaxis)  aspirin (Short breath)  divalproex sodium (Other (Unknown))  Flowers and Plants (Short breath)  Haldol (Other (Unknown))  penicillin (Short breath; Rash)  sulfa drugs (Short breath; Rash)  vancomycin (Rash; Urticaria; Hives)  Xanax (Other (Unknown))    Intolerances        ---___---___---___---___---___---  MEDICATIONS:  MEDICATIONS  (STANDING):  ALBUTerol/ipratropium for Nebulization 3 milliLiter(s) Nebulizer every 6 hours  apixaban 2.5 milliGRAM(s) Oral two times a day  artificial tears (preservative free) Ophthalmic Solution 1 Drop(s) Both EYES four times a day  BACItracin   Ointment 1 Application(s) Topical two times a day  chlorhexidine 2% Cloths 1 Application(s) Topical daily  clonazePAM  Tablet 1 milliGRAM(s) Oral at bedtime  Dakins Solution - 1/4 Strength 1 Application(s) Topical two times a day  dextrose 5%. 1000 milliLiter(s) (50 mL/Hr) IV Continuous <Continuous>  famotidine    Tablet 20 milliGRAM(s) Oral every 48 hours  ferrous    sulfate Liquid 300 milliGRAM(s) Enteral Tube daily  formoterol for nebulization 20 MICROGram(s) Nebulizer two times a day  gabapentin   Solution 300 milliGRAM(s) Oral daily  insulin lispro (HumaLOG) corrective regimen sliding scale   SubCutaneous every 6 hours  levothyroxine Injectable 60 MICROGram(s) IV Push at bedtime  Medical Marijuana [place]ake Oil 2 milliLiter(s),Medical Marijuana Awake Oil 2 milliLiter(s) 2 milliLiter(s) Enteral Tube <User Schedule>  Medical Marijuana Calm Oil 2 milliLiter(s),Medical [place] Calm Oil 2 milliLiter(s) 2 milliLiter(s) Enteral Tube <User Schedule>  Medical Marijuana [place] Oil 2 milliLiter(s),Medical Marijuana [place] Oil 2 milliLiter(s) 2 milliLiter(s) Enteral Tube <User Schedule>  metoprolol tartrate 12.5 milliGRAM(s) Oral two times a day  multivitamin/minerals/iron Oral Solution (CENTRUM) 15 milliLiter(s) Oral daily  QUEtiapine 25 milliGRAM(s) Oral at bedtime  sevelamer carbonate Powder 800 milliGRAM(s) Oral three times a day with meals  tiZANidine 4 milliGRAM(s) Oral <User Schedule>  zinc sulfate 220 milliGRAM(s) Oral daily    MEDICATIONS  (PRN):  acetaminophen  Suppository .. 650 milliGRAM(s) Rectal every 6 hours PRN Temp greater or equal to 38C (100.4F), Mild Pain (1 - 3)  carboxymethylcellulose 0.5% (Preservative-Free) Ophthalmic Solution 1 Drop(s) Both EYES three times a day PRN dry eyes  glucagon  Injectable 1 milliGRAM(s) IntraMuscular once PRN Glucose LESS THAN 70 milligrams/deciliter  nystatin Powder 1 Application(s) Topical three times a day PRN under breasts      ---___---___---___---___---___---  REVIEW OF SYSTEM:    unable toobtain     ---___---___---___---___---___---  VITAL SIGNS:  69y , CAPILLARY BLOOD GLUCOSE      POCT Blood Glucose.: 157 mg/dL (2019 06:40)    T(C): 37.4 (19 @ 06:50), Max: 38.8 (19 @ 04:59)  HR: 116 (19 @ 04:59) (105 - 118)  BP: 129/67 (19 @ 04:59) (109/70 - 144/70)  RR: 20 (19 @ 04:59) (18 - 20)  SpO2: 96% (19 @ 04:59) (96% - 100%)  ---___---___---___---___---___---  PHYSICAL EXAM:    GEN: A&O X 0, NAD , comfortable thin femal   HEENT: NCAT, PERRL, MMM, hearing intact  Neck: supple , no JVD  CVS: S1S2 , regular , No M/R/G appreciated  PULM: CTA B/L,  no W/R/R appreciated  ABD.: soft. non tender, non distended,  bowel sounds present leakage and drainage around the peg site noted   Extrem: intact pulses ,  edema   Derm: No rash ,  sacral decubitus noted      ---___---___---___---___---___---            LAB AND IMAGIN.5    17.9  )-----------( 207      ( 2019 07:08 )             25.3               07-31    130<L>  |  87<L>  |  139<H>  ----------------------------<  134<H>  3.9   |  19<L>  |  3.50<H>    Ca    8.0<L>      2019 07:08  Phos  6.5     07  Mg     2.1         TPro  5.3<L>  /  Alb  2.5<L>  /  TBili  0.3  /  DBili  x   /  AST  12  /  ALT  7<L>  /  AlkPhos  79                                  [All pertinent / recent Imaging reviewed]         ---___---___---___---___---___---___ ---___---___---___---___---                         A S S E S S M E N T   A N D   P L A N :      HEALTH ISSUES - PROBLEM Dx:  TRANG (acute kidney injury): TRANG (acute kidney injury) worsening renal function . hd not an option patient is a poor candidate due to dementia and other comorbid conditions   Fever: Fever  pan cultured   PEG tube malfunction: PEG tube malfunction gi to follow up again   pressure ulcer sacrum  continue wound care   dm2  on insulin   chf  on coreg   -GI/DVT Prophylaxis. on eliquis   chronic pain  on medical marijuana zanaflex and neurontin    patient prognosis is overall poor and kidney function is waning     --------------------------------------------  Case discussed with   Education given on   ___________________________  Thank you,  Deniz Bolden  [phone]

## 2019-07-31 NOTE — PROVIDER CONTACT NOTE (OTHER) - ACTION/TREATMENT ORDERED:
administer tylenol, have gastro assess pt, BC, Chest xray, labs sent, cont to monitor pt. Repeat rectal temp=99.3

## 2019-07-31 NOTE — PROGRESS NOTE ADULT - SUBJECTIVE AND OBJECTIVE BOX
PULMONARY PROGRESS NOTE    MYRIAM HEATH  MRN-[id number]    Patient is a 69y old  Female who presents with a chief complaint of PEG tube dislodgement, fever, leukocytosis (31 Jul 2019 11:24)      HPI:      ROS:   -    ACTIVE MEDICATION LIST:  MEDICATIONS  (STANDING):  ALBUTerol/ipratropium for Nebulization 3 milliLiter(s) Nebulizer every 6 hours  apixaban 2.5 milliGRAM(s) Oral two times a day  artificial tears (preservative free) Ophthalmic Solution 1 Drop(s) Both EYES four times a day  BACItracin   Ointment 1 Application(s) Topical two times a day  chlorhexidine 2% Cloths 1 Application(s) Topical daily  clonazePAM  Tablet 1 milliGRAM(s) Oral at bedtime  Dakins Solution - 1/4 Strength 1 Application(s) Topical two times a day  dextrose 5%. 1000 milliLiter(s) (50 mL/Hr) IV Continuous <Continuous>  famotidine    Tablet 20 milliGRAM(s) Oral every 48 hours  ferrous    sulfate Liquid 300 milliGRAM(s) Enteral Tube daily  formoterol for nebulization 20 MICROGram(s) Nebulizer two times a day  gabapentin   Solution 300 milliGRAM(s) Oral daily  insulin lispro (HumaLOG) corrective regimen sliding scale   SubCutaneous every 6 hours  levothyroxine Injectable 60 MICROGram(s) IV Push at bedtime  Medical Marijuana Awake Oil 2 milliLiter(s),Medical Marijuana Awake Oil 2 milliLiter(s) 2 milliLiter(s) Enteral Tube <User Schedule>  Medical Marijuana Calm Oil 2 milliLiter(s),Medical [place] Calm Oil 2 milliLiter(s) 2 milliLiter(s) Enteral Tube <User Schedule>  Medical Marijuana [place] Oil 2 milliLiter(s),Medical Marijuana [place] Oil 2 milliLiter(s) 2 milliLiter(s) Enteral Tube <User Schedule>  metoprolol tartrate 12.5 milliGRAM(s) Oral two times a day  multivitamin/minerals/iron Oral Solution (CENTRUM) 15 milliLiter(s) Oral daily  QUEtiapine 25 milliGRAM(s) Oral at bedtime  sevelamer carbonate Powder 800 milliGRAM(s) Oral three times a day with meals  sodium chloride 0.9%. 1000 milliLiter(s) (50 mL/Hr) IV Continuous <Continuous>  tiZANidine 4 milliGRAM(s) Oral <User Schedule>  zinc sulfate 220 milliGRAM(s) Oral daily    MEDICATIONS  (PRN):  acetaminophen  Suppository .. 650 milliGRAM(s) Rectal every 6 hours PRN Temp greater or equal to 38C (100.4F), Mild Pain (1 - 3)  carboxymethylcellulose 0.5% (Preservative-Free) Ophthalmic Solution 1 Drop(s) Both EYES three times a day PRN dry eyes  glucagon  Injectable 1 milliGRAM(s) IntraMuscular once PRN Glucose LESS THAN 70 milligrams/deciliter  nystatin Powder 1 Application(s) Topical three times a day PRN under breasts      EXAM:  Vital Signs Last 24 Hrs  T(C): 36.7 (31 Jul 2019 13:00), Max: 38.8 (31 Jul 2019 04:59)  T(F): 98.1 (31 Jul 2019 13:00), Max: 101.8 (31 Jul 2019 04:59)  HR: 85 (31 Jul 2019 13:00) (85 - 118)  BP: 110/70 (31 Jul 2019 13:00) (110/70 - 144/70)  BP(mean): --  RR: 18 (31 Jul 2019 13:00) (18 - 20)  SpO2: 100% (31 Jul 2019 13:00) (96% - 100%)    GENERAL: The patient is awake and alert in no apparent distress.     LUNGS: Clear to auscultation without wheezing, rales or rhonchi anterior lung fields    HEART: Regular rate and rhythm without murmur.                            8.5    17.9  )-----------( 207      ( 31 Jul 2019 07:08 )             25.3       07-31    130<L>  |  87<L>  |  139<H>  ----------------------------<  134<H>  3.9   |  19<L>  |  3.50<H>    Ca    8.0<L>      31 Jul 2019 07:08  Phos  6.5     07-30  Mg     2.1     07-30    TPro  5.3<L>  /  Alb  2.5<L>  /  TBili  0.3  /  DBili  x   /  AST  12  /  ALT  7<L>  /  AlkPhos  79  07-31    < from: Xray Chest 1 View- PORTABLE-Urgent (07.31.19 @ 05:45) >    EXAM:  XR CHEST PORTABLE URGENT 1V                            PROCEDURE DATE:  07/31/2019            INTERPRETATION:  CLINICAL INFORMATION: Fever     EXAM: AP view of chest.    COMPARISON: Chest radiograph from 7/23/2019.    FINDINGS:    Left pleural effusion.  No pneumothorax.  Cardiomediastinal silhouette cannot be accurately assessed on this AP   projection.   Low lung volume. Pulmonary vascular crowding.    IMPRESSION:   Low lung volume.  Left pleural effusion.                AGNES SAWYER M.D., RADIOLOGY RESIDENT  This document has been electronically signed.  JAYRO SABA M.D., ATTENDING RADIOLOGIST  This document has been electronically signed. Jul 31 2019 10:36AM        < end of copied text >      PROBLEM LIST:  69y Female with HEALTH ISSUES - PROBLEM Dx:  TRANG (acute kidney injury): TRANG (acute kidney injury)  Fever: Fever  Type 2 diabetes mellitus without complication, without long-term current use of insulin:   Make sure you get your HgA1c checked every three months.  If you take oral diabetes medications, check your blood glucose two times a day.  If you take insulin, check your blood glucose before meals and at bedtime.  It&#x27;s important not to skip any meals.  Keep a log of your blood glucose results and always take it with you to your doctor appointments.  Keep a list of your current medications including injectables and over the counter medications and bring this medication list with you to all your doctor appointments.  If you have not seen your ophthalmologist this year call for appointment.  Check your feet daily for redness, sores, or openings. Do not self treat. If no improvement in two days call your primary care physician for an appointment.  Low blood sugar (hypoglycemia) is a blood sugar below 70mg/dl. Check your blood sugar if you feel signs/symptoms of hypoglycemia. If your blood sugar is below 70 take 15 grams of carbohydrates (ex 4 oz of apple juice, 3-4 glucose tablets, or 4-6 oz of regular soda) wait 15 minutes and repeat blood sugar to make sure it comes up above 70.  If your blood sugar is above 70 and you are due for a meal, have a meal.  If you are not due for a meal have a snack.  This snack helps keeps your blood sugar at a safe range.    Pressure injury of skin of sacral region, unspecified injury stage: Continue with wound care as instructed.   Maintain adequate nutrition, frequent repositioning , offloading with Zfloat boots.  Follow-up with your primary care physician and Wound Care Specialist within 1 week. Call for appointment.    Chronic deep vein thrombosis (DVT) of right lower extremity, unspecified vein: Take your &quot;blood thinners&quot; as prescribed.  Walking is encouraged, increase activity as tolerated.  If you develop new leg pain, swelling, and/or redness contact your healthcare provider.  If you develop new chest pain with difficulty breathing, a rapid heart rate and/or a feeling of passing out call emergency medical services 911.    Prophylactic measure: Prophylactic measure  Dementia without behavioral disturbance, unspecified dementia type: Continue with medications as prescribed by your doctor.  Maintain safe environment.  Maintain adequate nutrition, hydration.  Follow-up with your primary care physician within 1 week. Call for appointment.    Uncomplicated asthma, unspecified asthma severity, unspecified whether persistent: Stable.  Follow-up with your primary care physician within 1 week. Call for appointment.    Sepsis, due to unspecified organism: Take all antibiotics as ordered.  Call you Health care provider upon arrival home to make a one week follow up appointment.  If you develop fever, chills, malaise, or change in mental status call your Health Care Provider or go to the Emergency Department.  Nutrition is important, eat small frequent meals to help ensure you get adequate calories.  Do not stay in bed all day!  Increase your activity daily as tolerated.    PEG tube malfunction: PEG tube malfunction            RECS:    prednisone taper; prolonged; with goal to stop and continue with home prednisone dose- 7.5mg daily  continue 02  chest pt  nebs standing  noted xray with persistent effusion- f/u cardio, renal re: diuresis?        Please call with any questions.    Leigh Ann Resendez, DO  [facility] Pulmonary/Sleep Medicine  [phone] PULMONARY PROGRESS NOTE    MYRIAM HEATH  MRN-[id number]    Patient is a 69y old  Female who presents with a chief complaint of PEG tube dislodgement, fever, leukocytosis (31 Jul 2019 11:24)      HPI:  fever overnight    ROS:   -    ACTIVE MEDICATION LIST:  MEDICATIONS  (STANDING):  ALBUTerol/ipratropium for Nebulization 3 milliLiter(s) Nebulizer every 6 hours  apixaban 2.5 milliGRAM(s) Oral two times a day  artificial tears (preservative free) Ophthalmic Solution 1 Drop(s) Both EYES four times a day  BACItracin   Ointment 1 Application(s) Topical two times a day  chlorhexidine 2% Cloths 1 Application(s) Topical daily  clonazePAM  Tablet 1 milliGRAM(s) Oral at bedtime  Dakins Solution - 1/4 Strength 1 Application(s) Topical two times a day  dextrose 5%. 1000 milliLiter(s) (50 mL/Hr) IV Continuous <Continuous>  famotidine    Tablet 20 milliGRAM(s) Oral every 48 hours  ferrous    sulfate Liquid 300 milliGRAM(s) Enteral Tube daily  formoterol for nebulization 20 MICROGram(s) Nebulizer two times a day  gabapentin   Solution 300 milliGRAM(s) Oral daily  insulin lispro (HumaLOG) corrective regimen sliding scale   SubCutaneous every 6 hours  levothyroxine Injectable 60 MICROGram(s) IV Push at bedtime  Medical Marijuana Awake Oil 2 milliLiter(s),Medical Marijuana Awake Oil 2 milliLiter(s) 2 milliLiter(s) Enteral Tube <User Schedule>  Medical Marijuana Calm Oil 2 milliLiter(s),Medical [place] Calm Oil 2 milliLiter(s) 2 milliLiter(s) Enteral Tube <User Schedule>  Medical Marijuana [place] Oil 2 milliLiter(s),Medical Marijuana [place] Oil 2 milliLiter(s) 2 milliLiter(s) Enteral Tube <User Schedule>  metoprolol tartrate 12.5 milliGRAM(s) Oral two times a day  multivitamin/minerals/iron Oral Solution (CENTRUM) 15 milliLiter(s) Oral daily  QUEtiapine 25 milliGRAM(s) Oral at bedtime  sevelamer carbonate Powder 800 milliGRAM(s) Oral three times a day with meals  sodium chloride 0.9%. 1000 milliLiter(s) (50 mL/Hr) IV Continuous <Continuous>  tiZANidine 4 milliGRAM(s) Oral <User Schedule>  zinc sulfate 220 milliGRAM(s) Oral daily    MEDICATIONS  (PRN):  acetaminophen  Suppository .. 650 milliGRAM(s) Rectal every 6 hours PRN Temp greater or equal to 38C (100.4F), Mild Pain (1 - 3)  carboxymethylcellulose 0.5% (Preservative-Free) Ophthalmic Solution 1 Drop(s) Both EYES three times a day PRN dry eyes  glucagon  Injectable 1 milliGRAM(s) IntraMuscular once PRN Glucose LESS THAN 70 milligrams/deciliter  nystatin Powder 1 Application(s) Topical three times a day PRN under breasts      EXAM:  Vital Signs Last 24 Hrs  T(C): 36.7 (31 Jul 2019 13:00), Max: 38.8 (31 Jul 2019 04:59)  T(F): 98.1 (31 Jul 2019 13:00), Max: 101.8 (31 Jul 2019 04:59)  HR: 85 (31 Jul 2019 13:00) (85 - 118)  BP: 110/70 (31 Jul 2019 13:00) (110/70 - 144/70)  BP(mean): --  RR: 18 (31 Jul 2019 13:00) (18 - 20)  SpO2: 100% (31 Jul 2019 13:00) (96% - 100%)    GENERAL: The patient is not awake; not alert    LUNGS: rhonchi anterior lung fields    HEART: Regular rate and rhythm without murmur.                            8.5    17.9  )-----------( 207      ( 31 Jul 2019 07:08 )             25.3       07-31    130<L>  |  87<L>  |  139<H>  ----------------------------<  134<H>  3.9   |  19<L>  |  3.50<H>    Ca    8.0<L>      31 Jul 2019 07:08  Phos  6.5     07-30  Mg     2.1     07-30    TPro  5.3<L>  /  Alb  2.5<L>  /  TBili  0.3  /  DBili  x   /  AST  12  /  ALT  7<L>  /  AlkPhos  79  07-31    < from: Xray Chest 1 View- PORTABLE-Urgent (07.31.19 @ 05:45) >    EXAM:  XR CHEST PORTABLE URGENT 1V                            PROCEDURE DATE:  07/31/2019            INTERPRETATION:  CLINICAL INFORMATION: Fever     EXAM: AP view of chest.    COMPARISON: Chest radiograph from 7/23/2019.    FINDINGS:    Left pleural effusion.  No pneumothorax.  Cardiomediastinal silhouette cannot be accurately assessed on this AP   projection.   Low lung volume. Pulmonary vascular crowding.    IMPRESSION:   Low lung volume.  Left pleural effusion.                AGNES SAWYER M.D., RADIOLOGY RESIDENT  This document has been electronically signed.  JAYRO SABA M.D., ATTENDING RADIOLOGIST  This document has been electronically signed. Jul 31 2019 10:36AM        < end of copied text >      PROBLEM LIST:  69y Female with HEALTH ISSUES - PROBLEM Dx:  TRANG (acute kidney injury): TRANG (acute kidney injury)  Fever: Fever  Type 2 diabetes mellitus without complication, without long-term current use of insulin:   Make sure you get your HgA1c checked every three months.  If you take oral diabetes medications, check your blood glucose two times a day.  If you take insulin, check your blood glucose before meals and at bedtime.  It&#x27;s important not to skip any meals.  Keep a log of your blood glucose results and always take it with you to your doctor appointments.  Keep a list of your current medications including injectables and over the counter medications and bring this medication list with you to all your doctor appointments.  If you have not seen your ophthalmologist this year call for appointment.  Check your feet daily for redness, sores, or openings. Do not self treat. If no improvement in two days call your primary care physician for an appointment.  Low blood sugar (hypoglycemia) is a blood sugar below 70mg/dl. Check your blood sugar if you feel signs/symptoms of hypoglycemia. If your blood sugar is below 70 take 15 grams of carbohydrates (ex 4 oz of apple juice, 3-4 glucose tablets, or 4-6 oz of regular soda) wait 15 minutes and repeat blood sugar to make sure it comes up above 70.  If your blood sugar is above 70 and you are due for a meal, have a meal.  If you are not due for a meal have a snack.  This snack helps keeps your blood sugar at a safe range.    Pressure injury of skin of sacral region, unspecified injury stage: Continue with wound care as instructed.   Maintain adequate nutrition, frequent repositioning , offloading with Zfloat boots.  Follow-up with your primary care physician and Wound Care Specialist within 1 week. Call for appointment.    Chronic deep vein thrombosis (DVT) of right lower extremity, unspecified vein: Take your &quot;blood thinners&quot; as prescribed.  Walking is encouraged, increase activity as tolerated.  If you develop new leg pain, swelling, and/or redness contact your healthcare provider.  If you develop new chest pain with difficulty breathing, a rapid heart rate and/or a feeling of passing out call emergency medical services 911.    Prophylactic measure: Prophylactic measure  Dementia without behavioral disturbance, unspecified dementia type: Continue with medications as prescribed by your doctor.  Maintain safe environment.  Maintain adequate nutrition, hydration.  Follow-up with your primary care physician within 1 week. Call for appointment.    Uncomplicated asthma, unspecified asthma severity, unspecified whether persistent: Stable.  Follow-up with your primary care physician within 1 week. Call for appointment.    Sepsis, due to unspecified organism: Take all antibiotics as ordered.  Call you Health care provider upon arrival home to make a one week follow up appointment.  If you develop fever, chills, malaise, or change in mental status call your Health Care Provider or go to the Emergency Department.  Nutrition is important, eat small frequent meals to help ensure you get adequate calories.  Do not stay in bed all day!  Increase your activity daily as tolerated.    PEG tube malfunction: PEG tube malfunction            RECS:    prednisone taper; prolonged; with goal to stop and continue with home prednisone dose- 7.5mg daily  continue 02  chest pt  nebs standing  trial of mucomyst again today and tomorrow   noted xray with persistent effusion- f/u cardio, renal re: diuresis?        Please call with any questions.    Leigh Ann Resendez, DO  [facility] Pulmonary/Sleep Medicine  [phone]

## 2019-07-31 NOTE — PROGRESS NOTE ADULT - SUBJECTIVE AND OBJECTIVE BOX
HPI:  Patient seen and examined on [address] with her daughter at bedside.  No improvement in overall clinical status or renal function.  Ongoing urine output into gavin.    Review Of Systems:    Unable to assess secondary to advanced dementia    Medications:  acetaminophen  Suppository .. 650 milliGRAM(s) Rectal every 6 hours PRN  acetylcysteine 10%  Inhalation 5 milliLiter(s) Inhalation three times a day  ALBUTerol/ipratropium for Nebulization 3 milliLiter(s) Nebulizer every 6 hours  apixaban 2.5 milliGRAM(s) Oral two times a day  artificial tears (preservative free) Ophthalmic Solution 1 Drop(s) Both EYES four times a day  BACItracin   Ointment 1 Application(s) Topical two times a day  carboxymethylcellulose 0.5% (Preservative-Free) Ophthalmic Solution 1 Drop(s) Both EYES three times a day PRN  chlorhexidine 2% Cloths 1 Application(s) Topical daily  clonazePAM  Tablet 1 milliGRAM(s) Oral at bedtime  Dakins Solution - 1/4 Strength 1 Application(s) Topical two times a day  dextrose 5%. 1000 milliLiter(s) IV Continuous <Continuous>  famotidine    Tablet 20 milliGRAM(s) Oral every 48 hours  ferrous    sulfate Liquid 300 milliGRAM(s) Enteral Tube daily  formoterol for nebulization 20 MICROGram(s) Nebulizer two times a day  gabapentin   Solution 300 milliGRAM(s) Oral daily  glucagon  Injectable 1 milliGRAM(s) IntraMuscular once PRN  insulin lispro (HumaLOG) corrective regimen sliding scale   SubCutaneous every 6 hours  levothyroxine Injectable 60 MICROGram(s) IV Push at bedtime  Medical Marijuana Awake Oil 2 milliLiter(s),Medical Marijuana Awake Oil 2 milliLiter(s) 2 milliLiter(s) Enteral Tube <User Schedule>  Medical Marijuana Calm Oil 2 milliLiter(s),Medical [place] Calm Oil 2 milliLiter(s) 2 milliLiter(s) Enteral Tube <User Schedule>  Medical Marijuana [place] Oil 2 milliLiter(s),Medical Marijuana [place] Oil 2 milliLiter(s) 2 milliLiter(s) Enteral Tube <User Schedule>  metoprolol tartrate 12.5 milliGRAM(s) Oral two times a day  multivitamin/minerals/iron Oral Solution (CENTRUM) 15 milliLiter(s) Oral daily  nystatin Powder 1 Application(s) Topical three times a day PRN  predniSONE   Tablet 40 milliGRAM(s) Oral daily  QUEtiapine 25 milliGRAM(s) Oral at bedtime  sevelamer carbonate Powder 800 milliGRAM(s) Oral three times a day with meals  sodium chloride 0.9%. 1000 milliLiter(s) IV Continuous <Continuous>  tiZANidine 4 milliGRAM(s) Oral <User Schedule>  zinc sulfate 220 milliGRAM(s) Oral daily    PAST MEDICAL & SURGICAL HISTORY:  Type 2 diabetes mellitus  Dementia  DVT, lower extremity  CVA (cerebral vascular accident)  Fatty pancreas  PNA (pneumonia)  Pulmonary HTN  IGT (impaired glucose tolerance)  Ulcerative colitis  Acid reflux  Anxiety  Depression  Mouth sores  HLD (hyperlipidemia)  Asthma  S/P percutaneous endoscopic gastrostomy (PEG) tube placement: for dysphagia  Humeral head fracture  H/O: hysterectomy  H/O cataract extraction, left  History of knee replacement    Vitals:  T(C): 37.1 (07-31-19 @ 17:00), Max: 38.8 (07-31-19 @ 04:59)  HR: 102 (07-31-19 @ 17:00) (85 - 118)  BP: 126/85 (07-31-19 @ 17:00) (110/70 - 144/70)  BP(mean): --  RR: 17 (07-31-19 @ 17:00) (17 - 20)  SpO2: 100% (07-31-19 @ 17:00) (96% - 100%)  Wt(kg): --  Daily     Daily   I&O's Summary    30 Jul 2019 07:01  -  31 Jul 2019 07:00  --------------------------------------------------------  IN: 1045 mL / OUT: 350 mL / NET: 695 mL    31 Jul 2019 07:01  -  31 Jul 2019 19:54  --------------------------------------------------------  IN: 500 mL / OUT: 110 mL / NET: 390 mL        Physical Exam:  Appearance: functional quadriplegia   Eyes: PERRLA, EOMI, pink conjunctiva, no scleral icterus   HENT: Normal oral mucosa  Cardiovascular: tachycardic S1, S2, no murmur, rub, or gallop; NO edema in hands or feet; no JVD  Procedural Access Site: Clean, dry, intact, without hematoma  Respiratory: bilateral air entry; poor inspiratory effort  Gastrointestinal: Soft, non-tender, non-distended, BS+, +PEG  Musculoskeletal: +contracted  Neurologic: functional quadriplegia   Lymphatic: No lymphadenopathy  Psychiatry: advanced dementia  Skin: multiple sites of skin breakdown, stage IV sacral decub                          8.5    17.9  )-----------( 207      ( 31 Jul 2019 07:08 )             25.3     07-31    130<L>  |  87<L>  |  139<H>  ----------------------------<  134<H>  3.9   |  19<L>  |  3.50<H>    Ca    8.0<L>      31 Jul 2019 07:08  Phos  6.5     07-30  Mg     2.1     07-30    TPro  5.3<L>  /  Alb  2.5<L>  /  TBili  0.3  /  DBili  x   /  AST  12  /  ALT  7<L>  /  AlkPhos  79  07-31

## 2019-08-01 LAB
ANION GAP SERPL CALC-SCNC: 20 MMOL/L — HIGH (ref 5–17)
BUN SERPL-MCNC: 139 MG/DL — HIGH (ref 7–23)
CALCIUM SERPL-MCNC: 7.9 MG/DL — LOW (ref 8.4–10.5)
CHLORIDE SERPL-SCNC: 90 MMOL/L — LOW (ref 96–108)
CO2 SERPL-SCNC: 21 MMOL/L — LOW (ref 22–31)
CREAT SERPL-MCNC: 3.69 MG/DL — HIGH (ref 0.5–1.3)
GLUCOSE BLDC GLUCOMTR-MCNC: 105 MG/DL — HIGH (ref 70–99)
GLUCOSE BLDC GLUCOMTR-MCNC: 121 MG/DL — HIGH (ref 70–99)
GLUCOSE BLDC GLUCOMTR-MCNC: 171 MG/DL — HIGH (ref 70–99)
GLUCOSE BLDC GLUCOMTR-MCNC: 228 MG/DL — HIGH (ref 70–99)
GLUCOSE SERPL-MCNC: 117 MG/DL — HIGH (ref 70–99)
HCT VFR BLD CALC: 24.7 % — LOW (ref 34.5–45)
HGB BLD-MCNC: 8 G/DL — LOW (ref 11.5–15.5)
MCHC RBC-ENTMCNC: 29.2 PG — SIGNIFICANT CHANGE UP (ref 27–34)
MCHC RBC-ENTMCNC: 32.4 GM/DL — SIGNIFICANT CHANGE UP (ref 32–36)
MCV RBC AUTO: 90.1 FL — SIGNIFICANT CHANGE UP (ref 80–100)
PLATELET # BLD AUTO: 188 K/UL — SIGNIFICANT CHANGE UP (ref 150–400)
POTASSIUM SERPL-MCNC: 4.1 MMOL/L — SIGNIFICANT CHANGE UP (ref 3.5–5.3)
POTASSIUM SERPL-SCNC: 4.1 MMOL/L — SIGNIFICANT CHANGE UP (ref 3.5–5.3)
RAPID RVP RESULT: SIGNIFICANT CHANGE UP
RBC # BLD: 2.74 M/UL — LOW (ref 3.8–5.2)
RBC # FLD: 16.4 % — HIGH (ref 10.3–14.5)
SODIUM SERPL-SCNC: 131 MMOL/L — LOW (ref 135–145)
WBC # BLD: 13.76 K/UL — HIGH (ref 3.8–10.5)
WBC # FLD AUTO: 13.76 K/UL — HIGH (ref 3.8–10.5)

## 2019-08-01 PROCEDURE — 99231 SBSQ HOSP IP/OBS SF/LOW 25: CPT

## 2019-08-01 PROCEDURE — 99232 SBSQ HOSP IP/OBS MODERATE 35: CPT

## 2019-08-01 PROCEDURE — 71045 X-RAY EXAM CHEST 1 VIEW: CPT | Mod: 26

## 2019-08-01 RX ADMIN — Medication 3 MILLILITER(S): at 12:22

## 2019-08-01 RX ADMIN — Medication 5 MILLILITER(S): at 14:09

## 2019-08-01 RX ADMIN — Medication 1 APPLICATION(S): at 06:45

## 2019-08-01 RX ADMIN — Medication 3 MILLILITER(S): at 00:20

## 2019-08-01 RX ADMIN — APIXABAN 2.5 MILLIGRAM(S): 2.5 TABLET, FILM COATED ORAL at 06:46

## 2019-08-01 RX ADMIN — Medication 1 APPLICATION(S): at 17:58

## 2019-08-01 RX ADMIN — Medication 1 APPLICATION(S): at 06:47

## 2019-08-01 RX ADMIN — TIZANIDINE 4 MILLIGRAM(S): 4 TABLET ORAL at 08:57

## 2019-08-01 RX ADMIN — GABAPENTIN 300 MILLIGRAM(S): 400 CAPSULE ORAL at 08:57

## 2019-08-01 RX ADMIN — Medication 5 MILLILITER(S): at 06:46

## 2019-08-01 RX ADMIN — Medication 3 MILLILITER(S): at 06:45

## 2019-08-01 RX ADMIN — Medication 12.5 MILLIGRAM(S): at 06:45

## 2019-08-01 RX ADMIN — Medication 3 MILLILITER(S): at 17:56

## 2019-08-01 RX ADMIN — TIZANIDINE 4 MILLIGRAM(S): 4 TABLET ORAL at 21:01

## 2019-08-01 RX ADMIN — Medication 1 DROP(S): at 12:22

## 2019-08-01 RX ADMIN — SEVELAMER CARBONATE 800 MILLIGRAM(S): 2400 POWDER, FOR SUSPENSION ORAL at 08:57

## 2019-08-01 RX ADMIN — CALAMINE AND ZINC OXIDE AND PHENOL 1 APPLICATION(S): 160; 10 LOTION TOPICAL at 00:20

## 2019-08-01 RX ADMIN — Medication 1 DROP(S): at 17:58

## 2019-08-01 RX ADMIN — Medication 1 APPLICATION(S): at 17:59

## 2019-08-01 RX ADMIN — Medication 15 MILLILITER(S): at 12:23

## 2019-08-01 RX ADMIN — SEVELAMER CARBONATE 800 MILLIGRAM(S): 2400 POWDER, FOR SUSPENSION ORAL at 12:25

## 2019-08-01 RX ADMIN — ZINC SULFATE TAB 220 MG (50 MG ZINC EQUIVALENT) 220 MILLIGRAM(S): 220 (50 ZN) TAB at 12:26

## 2019-08-01 RX ADMIN — Medication 20 MICROGRAM(S): at 06:45

## 2019-08-01 RX ADMIN — Medication 60 MICROGRAM(S): at 21:48

## 2019-08-01 RX ADMIN — CHLORHEXIDINE GLUCONATE 1 APPLICATION(S): 213 SOLUTION TOPICAL at 12:23

## 2019-08-01 RX ADMIN — Medication 2: at 18:12

## 2019-08-01 RX ADMIN — Medication 20 MICROGRAM(S): at 17:58

## 2019-08-01 RX ADMIN — Medication 650 MILLIGRAM(S): at 00:25

## 2019-08-01 RX ADMIN — Medication 40 MILLIGRAM(S): at 06:46

## 2019-08-01 RX ADMIN — Medication 1 MILLIGRAM(S): at 21:46

## 2019-08-01 RX ADMIN — Medication 12.5 MILLIGRAM(S): at 18:04

## 2019-08-01 RX ADMIN — Medication 1 DROP(S): at 06:46

## 2019-08-01 RX ADMIN — Medication 300 MILLIGRAM(S): at 12:23

## 2019-08-01 RX ADMIN — SEVELAMER CARBONATE 800 MILLIGRAM(S): 2400 POWDER, FOR SUSPENSION ORAL at 17:58

## 2019-08-01 RX ADMIN — Medication 1: at 12:22

## 2019-08-01 RX ADMIN — APIXABAN 2.5 MILLIGRAM(S): 2.5 TABLET, FILM COATED ORAL at 17:58

## 2019-08-01 RX ADMIN — Medication 5 MILLILITER(S): at 21:42

## 2019-08-01 RX ADMIN — QUETIAPINE FUMARATE 25 MILLIGRAM(S): 200 TABLET, FILM COATED ORAL at 21:46

## 2019-08-01 NOTE — PROGRESS NOTE ADULT - NSHPATTENDINGPLANDISCUSS_GEN_ALL_CORE
primary team,  at bedside.
patient's daughters at bedside
primary team,  at bedside.
NP on the floor and will call the 
primary team,  at bedside.
 and daughter
 in detail and NP on the floor
BENJI Paz
BRET Miller
BRET Miller
BRET Sanchez
PMD/Dr Brand and 
[facility]
Second Nephrology opinion and 
ICU Staffs, PMD and Families
Med NP + 
Med NP
Med NP +

## 2019-08-01 NOTE — PROGRESS NOTE ADULT - SUBJECTIVE AND OBJECTIVE BOX
NEPHROLOGY    Patient seen and examined.  Non-communicative  Coarse BS  fevers noted  s/p trial IVF     MEDICATIONS  (STANDING):  acetylcysteine 10%  Inhalation 5 milliLiter(s) Inhalation three times a day  ALBUTerol/ipratropium for Nebulization 3 milliLiter(s) Nebulizer every 6 hours  apixaban 2.5 milliGRAM(s) Oral two times a day  artificial tears (preservative free) Ophthalmic Solution 1 Drop(s) Both EYES four times a day  BACItracin   Ointment 1 Application(s) Topical two times a day  chlorhexidine 2% Cloths 1 Application(s) Topical daily  clonazePAM  Tablet 1 milliGRAM(s) Oral at bedtime  Dakins Solution - 1/4 Strength 1 Application(s) Topical two times a day  dextrose 5%. 1000 milliLiter(s) (50 mL/Hr) IV Continuous <Continuous>  famotidine    Tablet 20 milliGRAM(s) Oral every 48 hours  ferrous    sulfate Liquid 300 milliGRAM(s) Enteral Tube daily  formoterol for nebulization 20 MICROGram(s) Nebulizer two times a day  gabapentin   Solution 300 milliGRAM(s) Oral daily  hydrocortisone 1% Cream 1 Application(s) Topical once  insulin lispro (HumaLOG) corrective regimen sliding scale   SubCutaneous every 6 hours  levothyroxine Injectable 60 MICROGram(s) IV Push at bedtime  Medical Marijuana Awake Oil 2 milliLiter(s),Medical Marijuana Awake Oil 2 milliLiter(s) 2 milliLiter(s) Enteral Tube <User Schedule>  Medical Marijuana Calm Oil 2 milliLiter(s),Medical [place] Calm Oil 2 milliLiter(s) 2 milliLiter(s) Enteral Tube <User Schedule>  Medical Marijuana [place] Oil 2 milliLiter(s),Medical Marijuana [place] Oil 2 milliLiter(s) 2 milliLiter(s) Enteral Tube <User Schedule>  metoprolol tartrate 12.5 milliGRAM(s) Oral two times a day  multivitamin/minerals/iron Oral Solution (CENTRUM) 15 milliLiter(s) Oral daily  predniSONE   Tablet 40 milliGRAM(s) Oral daily  QUEtiapine 25 milliGRAM(s) Oral at bedtime  sevelamer carbonate Powder 800 milliGRAM(s) Oral three times a day with meals  sodium chloride 0.9%. 1000 milliLiter(s) (50 mL/Hr) IV Continuous <Continuous>  tiZANidine 4 milliGRAM(s) Oral <User Schedule>  zinc sulfate 220 milliGRAM(s) Oral daily    VITALS:  T(C): , Max: 38 (07-31-19 @ 21:21)  T(F): , Max: 100.4 (07-31-19 @ 21:21)  HR: 104 (08-01-19 @ 09:00)  BP: 104/62 (08-01-19 @ 09:00)  RR: 20 (08-01-19 @ 09:00)  SpO2: 100% (08-01-19 @ 09:00)    07-31 @ 07:01  -  08-01 @ 07:00  --------------------------------------------------------  IN: 1025 mL / OUT: 385 mL / NET: 640 mL    PHYSICAL EXAM:  Constitutional: frail, cachectic  Respiratory: coarse B/L  Cardiovascular: S1 and S2  Gastrointestinal: + BS, soft, + PEG  Extremities: + edema, contracted  Neurological: non-communicative   : + gavin      LABS:                        8.5    17.9  )-----------( 207      ( 31 Jul 2019 07:08 )             25.3     08-01    131<L>  |  90<L>  |  139<H>  ----------------------------<  117<H>  4.1   |  21<L>  |  3.69<H>    Ca    7.9<L>      01 Aug 2019 07:34    TPro  5.3<L>  /  Alb  2.5<L>  /  TBili  0.3  /  DBili  x   /  AST  12  /  ALT  7<L>  /  AlkPhos  79  07-31    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, left knee fracture, DM, RLE DVT, p/w dislodged PEG tube s/p replacement of PEG by IR c/b TRANG   - TRANG: multifactorial with solitary kidney - likely ATN - non-oliguric - BUN elevated possibly in part due to steroids   - metabolic acidosis with high anion gap: from TRANG + sepsis - stable  - hyponatremia: due to hypervolemia and TRANG -  stable  - hypervolemic on exam  - anemia: of chronic dz - hgb stable   - hyperphosphatemia: due to CKD/TRANG - on binders    RECOMMEND:  - palliative care consultation to determine GOC  - consider decrease steroids to 15 mg po daily, defer to pulm  - monitor strict IOs  - continue TF with nepro as able via PEG per GI  - continue Renvela powder 800 mg TID via PEG  - maintain chronic gavin catheter   - dose medication for a GFR ~ 10-15 mL/min    Laura Farr, NP  [facility]  [phone] NEPHROLOGY    Patient seen and examined.  Non-communicative  Coarse BS  fevers noted  s/p trial IVF     MEDICATIONS  (STANDING):  acetylcysteine 10%  Inhalation 5 milliLiter(s) Inhalation three times a day  ALBUTerol/ipratropium for Nebulization 3 milliLiter(s) Nebulizer every 6 hours  apixaban 2.5 milliGRAM(s) Oral two times a day  artificial tears (preservative free) Ophthalmic Solution 1 Drop(s) Both EYES four times a day  BACItracin   Ointment 1 Application(s) Topical two times a day  chlorhexidine 2% Cloths 1 Application(s) Topical daily  clonazePAM  Tablet 1 milliGRAM(s) Oral at bedtime  Dakins Solution - 1/4 Strength 1 Application(s) Topical two times a day  dextrose 5%. 1000 milliLiter(s) (50 mL/Hr) IV Continuous <Continuous>  famotidine    Tablet 20 milliGRAM(s) Oral every 48 hours  ferrous    sulfate Liquid 300 milliGRAM(s) Enteral Tube daily  formoterol for nebulization 20 MICROGram(s) Nebulizer two times a day  gabapentin   Solution 300 milliGRAM(s) Oral daily  hydrocortisone 1% Cream 1 Application(s) Topical once  insulin lispro (HumaLOG) corrective regimen sliding scale   SubCutaneous every 6 hours  levothyroxine Injectable 60 MICROGram(s) IV Push at bedtime  Medical Marijuana Awake Oil 2 milliLiter(s),Medical Marijuana Awake Oil 2 milliLiter(s) 2 milliLiter(s) Enteral Tube <User Schedule>  Medical Marijuana Calm Oil 2 milliLiter(s),Medical [place] Calm Oil 2 milliLiter(s) 2 milliLiter(s) Enteral Tube <User Schedule>  Medical Marijuana [place] Oil 2 milliLiter(s),Medical Marijuana [place] Oil 2 milliLiter(s) 2 milliLiter(s) Enteral Tube <User Schedule>  metoprolol tartrate 12.5 milliGRAM(s) Oral two times a day  multivitamin/minerals/iron Oral Solution (CENTRUM) 15 milliLiter(s) Oral daily  predniSONE   Tablet 40 milliGRAM(s) Oral daily  QUEtiapine 25 milliGRAM(s) Oral at bedtime  sevelamer carbonate Powder 800 milliGRAM(s) Oral three times a day with meals  sodium chloride 0.9%. 1000 milliLiter(s) (50 mL/Hr) IV Continuous <Continuous>  tiZANidine 4 milliGRAM(s) Oral <User Schedule>  zinc sulfate 220 milliGRAM(s) Oral daily    VITALS:  T(C): , Max: 38 (07-31-19 @ 21:21)  T(F): , Max: 100.4 (07-31-19 @ 21:21)  HR: 104 (08-01-19 @ 09:00)  BP: 104/62 (08-01-19 @ 09:00)  RR: 20 (08-01-19 @ 09:00)  SpO2: 100% (08-01-19 @ 09:00)    07-31 @ 07:01  -  08-01 @ 07:00  --------------------------------------------------------  IN: 1025 mL / OUT: 385 mL / NET: 640 mL    PHYSICAL EXAM:  Constitutional: frail, cachectic  Respiratory: coarse B/L  Cardiovascular: S1 and S2  Gastrointestinal: + BS, soft, + PEG  Extremities: + edema, contracted  Neurological: non-communicative   : + gavin      LABS:                        8.5    17.9  )-----------( 207      ( 31 Jul 2019 07:08 )             25.3     08-01    131<L>  |  90<L>  |  139<H>  ----------------------------<  117<H>  4.1   |  21<L>  |  3.69<H>    Ca    7.9<L>      01 Aug 2019 07:34    TPro  5.3<L>  /  Alb  2.5<L>  /  TBili  0.3  /  DBili  x   /  AST  12  /  ALT  7<L>  /  AlkPhos  79  07-31

## 2019-08-01 NOTE — PROGRESS NOTE ADULT - ASSESSMENT
69 year old female with multiple prolonged hospitalizations with dementia She is nonverbal, dysphagia, had  PEG tube, contractures, chronic Blackman catheter,  sacral decubitus , She had  IR to have PEG replaced on 6/25/19  She has DVT on AC  Leukocytosis , blood cultures NEG 7/31    Culture - Urine (07.13.19 @ 10:20)    Specimen Source: .Urine    Culture Results:   >=3 organisms. Probable collection contamination.    Recommendations   Continue G tube feeds   Keep G tube bumper at 2 cm richard on G tube.    Monitor bowel movements , was C. diff NEG on 6/26   Ongoing sacral wound care     Lakeisha Alicia ANP-BC   [facility]  [phone]  After hours and weekend coverage [phone]

## 2019-08-01 NOTE — PROGRESS NOTE ADULT - SUBJECTIVE AND OBJECTIVE BOX
---___---___---___---___---___---___ ---___---___---___---___---___---___---___---___---                  M E D I C A L   A T T E N D I N G   P R O G R E S S   N O T E  ---___---___---___---___---___---___ ---___---___---___---___---___---___---___---___---        ================================================    ++CHIEF COMPLAINT:   Patient is a 69y old  Female who presents with a chief complaint of PEG tube dislodgement, fever, leukocytosis (2019 19:54)      Gastrostomy malfunction    ---___---___---___---___---___---  PAST MEDICAL / Surgical  HISTORY:  PAST MEDICAL & SURGICAL HISTORY:  Type 2 diabetes mellitus  Dementia  DVT, lower extremity  CVA (cerebral vascular accident)  Fatty pancreas  PNA (pneumonia)  Pulmonary HTN  IGT (impaired glucose tolerance)  Ulcerative colitis  Acid reflux  Anxiety  Depression  Mouth sores  HLD (hyperlipidemia)  Asthma  S/P percutaneous endoscopic gastrostomy (PEG) tube placement: for dysphagia  Humeral head fracture  H/O: hysterectomy  H/O cataract extraction, left  History of knee replacement      ---___---___---___---___---___---  FAMILY HISTORY:   FAMILY HISTORY:  Family history of dementia (Grandparent)  Family history of colon cancer (Grandparent)        ---___---___---___---___---___---  ALLERGIES:   Allergies    ASA; dye contrast (Anaphylaxis)  aspirin (Short breath)  divalproex sodium (Other (Unknown))  Flowers and Plants (Short breath)  Haldol (Other (Unknown))  penicillin (Short breath; Rash)  sulfa drugs (Short breath; Rash)  vancomycin (Rash; Urticaria; Hives)  Xanax (Other (Unknown))    Intolerances        ---___---___---___---___---___---  MEDICATIONS:  MEDICATIONS  (STANDING):  acetylcysteine 10%  Inhalation 5 milliLiter(s) Inhalation three times a day  ALBUTerol/ipratropium for Nebulization 3 milliLiter(s) Nebulizer every 6 hours  apixaban 2.5 milliGRAM(s) Oral two times a day  artificial tears (preservative free) Ophthalmic Solution 1 Drop(s) Both EYES four times a day  BACItracin   Ointment 1 Application(s) Topical two times a day  chlorhexidine 2% Cloths 1 Application(s) Topical daily  clonazePAM  Tablet 1 milliGRAM(s) Oral at bedtime  Dakins Solution - 1/4 Strength 1 Application(s) Topical two times a day  dextrose 5%. 1000 milliLiter(s) (50 mL/Hr) IV Continuous <Continuous>  famotidine    Tablet 20 milliGRAM(s) Oral every 48 hours  ferrous    sulfate Liquid 300 milliGRAM(s) Enteral Tube daily  formoterol for nebulization 20 MICROGram(s) Nebulizer two times a day  gabapentin   Solution 300 milliGRAM(s) Oral daily  hydrocortisone 1% Cream 1 Application(s) Topical once  insulin lispro (HumaLOG) corrective regimen sliding scale   SubCutaneous every 6 hours  levothyroxine Injectable 60 MICROGram(s) IV Push at bedtime  Medical Marijuana Awake Oil 2 milliLiter(s),Medical Marijuana [place] Oil 2 milliLiter(s) 2 milliLiter(s) Enteral Tube <User Schedule>  Medical Marijuana Calm Oil 2 milliLiter(s),Medical [place] Calm Oil 2 milliLiter(s) 2 milliLiter(s) Enteral Tube <User Schedule>  Medical Marijuana [place] Oil 2 milliLiter(s),Medical Marijuana [place] Oil 2 milliLiter(s) 2 milliLiter(s) Enteral Tube <User Schedule>  metoprolol tartrate 12.5 milliGRAM(s) Oral two times a day  multivitamin/minerals/iron Oral Solution (CENTRUM) 15 milliLiter(s) Oral daily  predniSONE   Tablet 40 milliGRAM(s) Oral daily  QUEtiapine 25 milliGRAM(s) Oral at bedtime  sevelamer carbonate Powder 800 milliGRAM(s) Oral three times a day with meals  sodium chloride 0.9%. 1000 milliLiter(s) (50 mL/Hr) IV Continuous <Continuous>  tiZANidine 4 milliGRAM(s) Oral <User Schedule>  zinc sulfate 220 milliGRAM(s) Oral daily    MEDICATIONS  (PRN):  acetaminophen  Suppository .. 650 milliGRAM(s) Rectal every 6 hours PRN Temp greater or equal to 38C (100.4F), Mild Pain (1 - 3)  carboxymethylcellulose 0.5% (Preservative-Free) Ophthalmic Solution 1 Drop(s) Both EYES three times a day PRN dry eyes  glucagon  Injectable 1 milliGRAM(s) IntraMuscular once PRN Glucose LESS THAN 70 milligrams/deciliter  nystatin Powder 1 Application(s) Topical three times a day PRN under breasts      ---___---___---___---___---___---  REVIEW OF SYSTEM:    GEN: no fever, no chills, no pain  RESP: no SOB, no cough, no sputum  CVS: no chest pain, no palpitations, no edema  GI: no abdominal pain, no nausea, no vomiting, no constipation, no diarrhea  : no dysurea, no frequency, no hematurea  Neuro: no headache, no dizziness  PSYCH: no anxiety, no depression  Derm : no itching, no rash    ---___---___---___---___---___---  VITAL SIGNS:  69y , CAPILLARY BLOOD GLUCOSE      POCT Blood Glucose.: 121 mg/dL (01 Aug 2019 06:37)    T(C): 37 (19 @ 06:35), Max: 38 (19 @ 21:21)  HR: 109 (19 @ 06:35) (85 - 109)  BP: 113/69 (19 @ 06:35) (103/63 - 126/85)  RR: 18 (19 @ 06:35) (17 - 20)  SpO2: 99% (19 @ 06:35) (98% - 100%)  ---___---___---___---___---___---  PHYSICAL EXAM:    GEN: A&O X 0, NAD , comfortable  HEENT: NCAT, PERRL, MMM, hearing intact  Neck: supple , no JVD  CVS: S1S2 , regular , No M/R/G appreciated  PULM: CTA B/L,  no W/R/R appreciated  ABD.: soft. non tender, non distended,  bowel sounds present  Extrem: intact pulses , no edema   Derm: sacral decubitus multiple other decubiti   --___---___---___---___---___---            LAB AND IMAGIN.5    17.9  )-----------( 207      ( 2019 07:08 )             25.3               -    131<L>  |  90<L>  |  139<H>  ----------------------------<  117<H>  4.1   |  21<L>  |  3.69<H>    Ca    7.9<L>      01 Aug 2019 07:34    TPro  5.3<L>  /  Alb  2.5<L>  /  TBili  0.3  /  DBili  x   /  AST  12  /  ALT  7<L>  /  AlkPhos  79  -                                [All pertinent / recent Imaging reviewed]         ---___---___---___---___---___---___ ---___---___---___---___---                         A S S E S S M E N T   A N D   P L A N :    fever  libia multifactorial   pressure ulcer multiple noted   chronic pain   left leg fracture   asthma   dm2     continue current treatment and care   awaiting culture results to come back before treating  renal following for libia and electrolyte imbalance   medical marijuana and neurontin for chronic pain  seroquel and klonopin for agitation    told  that we will need to discuss hospice if creatinine trends higher and she is not getting better              -GI/DVT Prophylaxis.    --------------------------------------------  Case discussed with   Education given on   ___________________________  Thank you,  Deniz Bolden  [phone]

## 2019-08-01 NOTE — PROGRESS NOTE ADULT - ASSESSMENT
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, left knee fracture, DM, RLE DVT, p/w dislodged PEG tube s/p replacement of PEG by IR c/b TRANG   - TRANG: multifactorial with solitary kidney - likely ATN - non-oliguric - BUN elevated possibly in part due to steroids   - metabolic acidosis with high anion gap: from TRANG + sepsis - stable  - hyponatremia: due to hypervolemia and TRANG -  stable  - hypervolemic on exam  - anemia: of chronic dz - hgb stable   - hyperphosphatemia: due to CKD/TRANG - on binders    RECOMMEND:  - palliative care consultation to determine GOC  - consider decrease steroids to 15 mg po daily, defer to pulm  - monitor strict IOs  - continue TF with nepro as able via PEG per GI  - continue Renvela powder 800 mg TID via PEG  - maintain chronic gavin catheter   - dose medication for a GFR ~ 10-15 mL/min    Laura Farr NP  [facility]  [phone]

## 2019-08-01 NOTE — PROGRESS NOTE ADULT - SUBJECTIVE AND OBJECTIVE BOX
HPI:  Patient seen and examined on [address] [place] with both of her daughters at bedside.  CXR reviewed.    Review Of Systems:   Unable to assess in the setting of advanced dementia     Medications:  acetaminophen  Suppository .. 650 milliGRAM(s) Rectal every 6 hours PRN  acetylcysteine 10%  Inhalation 5 milliLiter(s) Inhalation three times a day  ALBUTerol/ipratropium for Nebulization 3 milliLiter(s) Nebulizer every 6 hours  apixaban 2.5 milliGRAM(s) Oral two times a day  artificial tears (preservative free) Ophthalmic Solution 1 Drop(s) Both EYES four times a day  BACItracin   Ointment 1 Application(s) Topical two times a day  carboxymethylcellulose 0.5% (Preservative-Free) Ophthalmic Solution 1 Drop(s) Both EYES three times a day PRN  chlorhexidine 2% Cloths 1 Application(s) Topical daily  clonazePAM  Tablet 1 milliGRAM(s) Oral at bedtime  Dakins Solution - 1/4 Strength 1 Application(s) Topical two times a day  dextrose 5%. 1000 milliLiter(s) IV Continuous <Continuous>  famotidine    Tablet 20 milliGRAM(s) Oral every 48 hours  ferrous    sulfate Liquid 300 milliGRAM(s) Enteral Tube daily  formoterol for nebulization 20 MICROGram(s) Nebulizer two times a day  gabapentin   Solution 300 milliGRAM(s) Oral daily  glucagon  Injectable 1 milliGRAM(s) IntraMuscular once PRN  hydrocortisone 1% Cream 1 Application(s) Topical once  insulin lispro (HumaLOG) corrective regimen sliding scale   SubCutaneous every 6 hours  levothyroxine Injectable 60 MICROGram(s) IV Push at bedtime  Medical Marijuana [place]ake Oil 2 milliLiter(s),Medical Marijuana [place]ake Oil 2 milliLiter(s) 2 milliLiter(s) Enteral Tube <User Schedule>  Medical Marijuana Calm Oil 2 milliLiter(s),Medical [place] Calm Oil 2 milliLiter(s) 2 milliLiter(s) Enteral Tube <User Schedule>  Medical Marijuana [place] Oil 2 milliLiter(s),Medical Marijuana [place] Oil 2 milliLiter(s) 2 milliLiter(s) Enteral Tube <User Schedule>  metoprolol tartrate 12.5 milliGRAM(s) Oral two times a day  multivitamin/minerals/iron Oral Solution (CENTRUM) 15 milliLiter(s) Oral daily  nystatin Powder 1 Application(s) Topical three times a day PRN  predniSONE   Tablet 40 milliGRAM(s) Oral daily  QUEtiapine 25 milliGRAM(s) Oral at bedtime  sevelamer carbonate Powder 800 milliGRAM(s) Oral three times a day with meals  sodium chloride 0.9%. 1000 milliLiter(s) IV Continuous <Continuous>  tiZANidine 4 milliGRAM(s) Oral <User Schedule>  zinc sulfate 220 milliGRAM(s) Oral daily    PAST MEDICAL & SURGICAL HISTORY:  Type 2 diabetes mellitus  Dementia  DVT, lower extremity  CVA (cerebral vascular accident)  Fatty pancreas  PNA (pneumonia)  Pulmonary HTN  IGT (impaired glucose tolerance)  Ulcerative colitis  Acid reflux  Anxiety  Depression  Mouth sores  HLD (hyperlipidemia)  Asthma  S/P percutaneous endoscopic gastrostomy (PEG) tube placement: for dysphagia  Humeral head fracture  H/O: hysterectomy  H/O cataract extraction, left  History of knee replacement    Vitals:  T(C): 36.8 (08-01-19 @ 17:00), Max: 38 (07-31-19 @ 21:21)  HR: 102 (08-01-19 @ 17:00) (99 - 109)  BP: 122/71 (08-01-19 @ 17:00) (103/63 - 122/71)  BP(mean): --  RR: 20 (08-01-19 @ 17:00) (18 - 20)  SpO2: 100% (08-01-19 @ 17:00) (96% - 100%)  Wt(kg): --  Daily     Daily   I&O's Summary    31 Jul 2019 07:01  -  01 Aug 2019 07:00  --------------------------------------------------------  IN: 1025 mL / OUT: 385 mL / NET: 640 mL    01 Aug 2019 07:01  -  01 Aug 2019 20:06  --------------------------------------------------------  IN: 950 mL / OUT: 240 mL / NET: 710 mL        Physical Exam:  Appearance: functional quadriplegia   Eyes: PERRLA, EOMI, pink conjunctiva, no scleral icterus   HENT: Normal oral mucosa  Cardiovascular: tachycardic S1, S2, no murmur, rub, or gallop; NO edema in hands or feet; no JVD  Procedural Access Site: Clean, dry, intact, without hematoma  Respiratory: bilateral air entry; poor inspiratory effort  Gastrointestinal: Soft, non-tender, non-distended, BS+, +PEG  Musculoskeletal: +contracted  Neurologic: functional quadriplegia   Lymphatic: No lymphadenopathy  Psychiatry: advanced dementia  Skin: multiple sites of skin breakdown, stage IV sacral decub                            8.0    13.76 )-----------( 188      ( 01 Aug 2019 12:45 )             24.7     08-01    131<L>  |  90<L>  |  139<H>  ----------------------------<  117<H>  4.1   |  21<L>  |  3.69<H>    Ca    7.9<L>      01 Aug 2019 07:34    TPro  5.3<L>  /  Alb  2.5<L>  /  TBili  0.3  /  DBili  x   /  AST  12  /  ALT  7<L>  /  AlkPhos  79  07-31                  ECG:    Echo:    Stress Testing:     Cath:    Imaging:    Interpretation of Telemetry:

## 2019-08-01 NOTE — PROGRESS NOTE ADULT - SUBJECTIVE AND OBJECTIVE BOX
INTERVAL HPI/OVERNIGHT EVENTS:  Patient positioned in bed for comfort and safety , spouse bedside      MEDICATIONS  (STANDING):  acetylcysteine 10%  Inhalation 5 milliLiter(s) Inhalation three times a day  ALBUTerol/ipratropium for Nebulization 3 milliLiter(s) Nebulizer every 6 hours  apixaban 2.5 milliGRAM(s) Oral two times a day  artificial tears (preservative free) Ophthalmic Solution 1 Drop(s) Both EYES four times a day  BACItracin   Ointment 1 Application(s) Topical two times a day  chlorhexidine 2% Cloths 1 Application(s) Topical daily  clonazePAM  Tablet 1 milliGRAM(s) Oral at bedtime  Dakins Solution - 1/4 Strength 1 Application(s) Topical two times a day  dextrose 5%. 1000 milliLiter(s) (50 mL/Hr) IV Continuous <Continuous>  famotidine    Tablet 20 milliGRAM(s) Oral every 48 hours  ferrous    sulfate Liquid 300 milliGRAM(s) Enteral Tube daily  formoterol for nebulization 20 MICROGram(s) Nebulizer two times a day  gabapentin   Solution 300 milliGRAM(s) Oral daily  hydrocortisone 1% Cream 1 Application(s) Topical once  insulin lispro (HumaLOG) corrective regimen sliding scale   SubCutaneous every 6 hours  levothyroxine Injectable 60 MICROGram(s) IV Push at bedtime  Medical Marijuana Awake Oil 2 milliLiter(s),Medical Marijuana Awake Oil 2 milliLiter(s) 2 milliLiter(s) Enteral Tube <User Schedule>  Medical Marijuana Calm Oil 2 milliLiter(s),Medical [place] Calm Oil 2 milliLiter(s) 2 milliLiter(s) Enteral Tube <User Schedule>  Medical Marijuana [place] Oil 2 milliLiter(s),Medical Marijuana [place] Oil 2 milliLiter(s) 2 milliLiter(s) Enteral Tube <User Schedule>  metoprolol tartrate 12.5 milliGRAM(s) Oral two times a day  multivitamin/minerals/iron Oral Solution (CENTRUM) 15 milliLiter(s) Oral daily  predniSONE   Tablet 40 milliGRAM(s) Oral daily  QUEtiapine 25 milliGRAM(s) Oral at bedtime  sevelamer carbonate Powder 800 milliGRAM(s) Oral three times a day with meals  sodium chloride 0.9%. 1000 milliLiter(s) (50 mL/Hr) IV Continuous <Continuous>  tiZANidine 4 milliGRAM(s) Oral <User Schedule>  zinc sulfate 220 milliGRAM(s) Oral daily    MEDICATIONS  (PRN):  acetaminophen  Suppository .. 650 milliGRAM(s) Rectal every 6 hours PRN Temp greater or equal to 38C (100.4F), Mild Pain (1 - 3)  carboxymethylcellulose 0.5% (Preservative-Free) Ophthalmic Solution 1 Drop(s) Both EYES three times a day PRN dry eyes  glucagon  Injectable 1 milliGRAM(s) IntraMuscular once PRN Glucose LESS THAN 70 milligrams/deciliter  nystatin Powder 1 Application(s) Topical three times a day PRN under breasts      Allergies    ASA; dye contrast (Anaphylaxis)  aspirin (Short breath)  divalproex sodium (Other (Unknown))  Flowers and Plants (Short breath)  Haldol (Other (Unknown))  penicillin (Short breath; Rash)  sulfa drugs (Short breath; Rash)  vancomycin (Rash; Urticaria; Hives)  Xanax (Other (Unknown))    Intolerances        Review of Systems:  Unable to obtain from patient   General:  max temp 100.4   ENT: +  dysphagia  GI: tolerating tube feeds at 25cc/hr , BM yesterday  Resp:  No dyspnea, cough, or wheezing      Vital Signs Last 24 Hrs  T(C): 36.9 (01 Aug 2019 09:00), Max: 38 (31 Jul 2019 21:21)  T(F): 98.5 (01 Aug 2019 09:00), Max: 100.4 (31 Jul 2019 21:21)  HR: 104 (01 Aug 2019 09:00) (85 - 109)  BP: 104/62 (01 Aug 2019 09:00) (103/63 - 126/85)  BP(mean): --  RR: 20 (01 Aug 2019 09:00) (17 - 20)  SpO2: 100% (01 Aug 2019 09:00) (99% - 100%)    PHYSICAL EXAM:    Constitutional: contracted female , non toxic and in NAD  Respiratory: anterior chest wall clear to auscultation   Cardiovascular: S1 and S2, RRR  Gastrointestinal: soft,  G tube site with scant drainage on drain sponge Ext Bumper at 2.5 cm skin level   Extremities: contractures , no  cyanosis  Skin: No jaundice       LABS:                        8.5    17.9  )-----------( 207      ( 31 Jul 2019 07:08 )             25.3     08-01    131<L>  |  90<L>  |  139<H>  ----------------------------<  117<H>  4.1   |  21<L>  |  3.69<H>    Ca    7.9<L>      01 Aug 2019 07:34    TPro  5.3<L>  /  Alb  2.5<L>  /  TBili  0.3  /  DBili  x   /  AST  12  /  ALT  7<L>  /  AlkPhos  79  07-31        LIVER FUNCTIONS - ( 31 Jul 2019 07:08 )  Alb: 2.5 g/dL / Pro: 5.3 g/dL / ALK PHOS: 79 U/L / ALT: 7 U/L / AST: 12 U/L / GGT: x             RADIOLOGY & ADDITIONAL TESTS:  < from: Xray Chest 1 View- PORTABLE-Urgent (07.31.19 @ 05:45) >  EXAM:  XR CHEST PORTABLE URGENT 1V                            PROCEDURE DATE:  07/31/2019            INTERPRETATION:  CLINICAL INFORMATION: Fever     EXAM: AP view of chest.    COMPARISON: Chest radiograph from 7/23/2019.    FINDINGS:    Left pleural effusion.  No pneumothorax.  Cardiomediastinal silhouette cannot be accurately assessed on this AP   projection.   Low lung volume. Pulmonary vascular crowding.    IMPRESSION:   Low lung volume.  Left pleural effusion.          < end of copied text >

## 2019-08-02 LAB
ANION GAP SERPL CALC-SCNC: 21 MMOL/L — HIGH (ref 5–17)
BUN SERPL-MCNC: 146 MG/DL — HIGH (ref 7–23)
CALCIUM SERPL-MCNC: 8.2 MG/DL — LOW (ref 8.4–10.5)
CHLORIDE SERPL-SCNC: 88 MMOL/L — LOW (ref 96–108)
CO2 SERPL-SCNC: 20 MMOL/L — LOW (ref 22–31)
CREAT SERPL-MCNC: 3.69 MG/DL — HIGH (ref 0.5–1.3)
GLUCOSE BLDC GLUCOMTR-MCNC: 175 MG/DL — HIGH (ref 70–99)
GLUCOSE BLDC GLUCOMTR-MCNC: 186 MG/DL — HIGH (ref 70–99)
GLUCOSE BLDC GLUCOMTR-MCNC: 225 MG/DL — HIGH (ref 70–99)
GLUCOSE BLDC GLUCOMTR-MCNC: 225 MG/DL — HIGH (ref 70–99)
GLUCOSE SERPL-MCNC: 175 MG/DL — HIGH (ref 70–99)
HCT VFR BLD CALC: 22.3 % — LOW (ref 34.5–45)
HGB BLD-MCNC: 7.5 G/DL — LOW (ref 11.5–15.5)
MAGNESIUM SERPL-MCNC: 2.1 MG/DL — SIGNIFICANT CHANGE UP (ref 1.6–2.6)
MCHC RBC-ENTMCNC: 29.3 PG — SIGNIFICANT CHANGE UP (ref 27–34)
MCHC RBC-ENTMCNC: 33.6 GM/DL — SIGNIFICANT CHANGE UP (ref 32–36)
MCV RBC AUTO: 87.1 FL — SIGNIFICANT CHANGE UP (ref 80–100)
PHOSPHATE SERPL-MCNC: 6.6 MG/DL — HIGH (ref 2.5–4.5)
PLATELET # BLD AUTO: 175 K/UL — SIGNIFICANT CHANGE UP (ref 150–400)
POTASSIUM SERPL-MCNC: 4 MMOL/L — SIGNIFICANT CHANGE UP (ref 3.5–5.3)
POTASSIUM SERPL-SCNC: 4 MMOL/L — SIGNIFICANT CHANGE UP (ref 3.5–5.3)
RBC # BLD: 2.56 M/UL — LOW (ref 3.8–5.2)
RBC # FLD: 16.2 % — HIGH (ref 10.3–14.5)
SODIUM SERPL-SCNC: 129 MMOL/L — LOW (ref 135–145)
WBC # BLD: 13.86 K/UL — HIGH (ref 3.8–10.5)
WBC # FLD AUTO: 13.86 K/UL — HIGH (ref 3.8–10.5)

## 2019-08-02 PROCEDURE — 99232 SBSQ HOSP IP/OBS MODERATE 35: CPT

## 2019-08-02 PROCEDURE — 99231 SBSQ HOSP IP/OBS SF/LOW 25: CPT

## 2019-08-02 RX ADMIN — SEVELAMER CARBONATE 800 MILLIGRAM(S): 2400 POWDER, FOR SUSPENSION ORAL at 12:35

## 2019-08-02 RX ADMIN — Medication 2: at 17:29

## 2019-08-02 RX ADMIN — Medication 12.5 MILLIGRAM(S): at 17:29

## 2019-08-02 RX ADMIN — Medication 5 MILLILITER(S): at 13:06

## 2019-08-02 RX ADMIN — APIXABAN 2.5 MILLIGRAM(S): 2.5 TABLET, FILM COATED ORAL at 06:25

## 2019-08-02 RX ADMIN — Medication 300 MILLIGRAM(S): at 12:36

## 2019-08-02 RX ADMIN — Medication 15 MILLILITER(S): at 12:34

## 2019-08-02 RX ADMIN — FAMOTIDINE 20 MILLIGRAM(S): 10 INJECTION INTRAVENOUS at 10:24

## 2019-08-02 RX ADMIN — Medication 1 DROP(S): at 06:25

## 2019-08-02 RX ADMIN — Medication 1 APPLICATION(S): at 17:28

## 2019-08-02 RX ADMIN — Medication 1: at 06:24

## 2019-08-02 RX ADMIN — Medication 1: at 00:43

## 2019-08-02 RX ADMIN — Medication 2: at 13:06

## 2019-08-02 RX ADMIN — SEVELAMER CARBONATE 800 MILLIGRAM(S): 2400 POWDER, FOR SUSPENSION ORAL at 08:38

## 2019-08-02 RX ADMIN — Medication 1 DROP(S): at 17:29

## 2019-08-02 RX ADMIN — Medication 20 MICROGRAM(S): at 18:45

## 2019-08-02 RX ADMIN — Medication 3 MILLILITER(S): at 12:34

## 2019-08-02 RX ADMIN — Medication 60 MICROGRAM(S): at 22:01

## 2019-08-02 RX ADMIN — APIXABAN 2.5 MILLIGRAM(S): 2.5 TABLET, FILM COATED ORAL at 17:29

## 2019-08-02 RX ADMIN — TIZANIDINE 4 MILLIGRAM(S): 4 TABLET ORAL at 21:06

## 2019-08-02 RX ADMIN — Medication 3 MILLILITER(S): at 17:29

## 2019-08-02 RX ADMIN — ZINC SULFATE TAB 220 MG (50 MG ZINC EQUIVALENT) 220 MILLIGRAM(S): 220 (50 ZN) TAB at 12:35

## 2019-08-02 RX ADMIN — CHLORHEXIDINE GLUCONATE 1 APPLICATION(S): 213 SOLUTION TOPICAL at 12:36

## 2019-08-02 RX ADMIN — Medication 3 MILLILITER(S): at 06:27

## 2019-08-02 RX ADMIN — Medication 1 DROP(S): at 12:35

## 2019-08-02 RX ADMIN — Medication 1 APPLICATION(S): at 06:28

## 2019-08-02 RX ADMIN — Medication 5 MILLILITER(S): at 06:26

## 2019-08-02 RX ADMIN — SEVELAMER CARBONATE 800 MILLIGRAM(S): 2400 POWDER, FOR SUSPENSION ORAL at 17:29

## 2019-08-02 RX ADMIN — Medication 40 MILLIGRAM(S): at 06:24

## 2019-08-02 RX ADMIN — Medication 1 APPLICATION(S): at 17:32

## 2019-08-02 RX ADMIN — Medication 3 MILLILITER(S): at 00:01

## 2019-08-02 RX ADMIN — GABAPENTIN 300 MILLIGRAM(S): 400 CAPSULE ORAL at 08:38

## 2019-08-02 RX ADMIN — Medication 1 APPLICATION(S): at 06:25

## 2019-08-02 RX ADMIN — TIZANIDINE 4 MILLIGRAM(S): 4 TABLET ORAL at 08:38

## 2019-08-02 RX ADMIN — QUETIAPINE FUMARATE 25 MILLIGRAM(S): 200 TABLET, FILM COATED ORAL at 22:01

## 2019-08-02 RX ADMIN — Medication 1 MILLIGRAM(S): at 22:01

## 2019-08-02 NOTE — PROGRESS NOTE ADULT - SUBJECTIVE AND OBJECTIVE BOX
NEPHROLOGY    Patient seen and examined.  No sig events noted    MEDICATIONS  (STANDING):  acetylcysteine 10%  Inhalation 5 milliLiter(s) Inhalation three times a day  ALBUTerol/ipratropium for Nebulization 3 milliLiter(s) Nebulizer every 6 hours  apixaban 2.5 milliGRAM(s) Oral two times a day  artificial tears (preservative free) Ophthalmic Solution 1 Drop(s) Both EYES four times a day  BACItracin   Ointment 1 Application(s) Topical two times a day  chlorhexidine 2% Cloths 1 Application(s) Topical daily  clonazePAM  Tablet 1 milliGRAM(s) Oral at bedtime  Dakins Solution - 1/4 Strength 1 Application(s) Topical two times a day  dextrose 5%. 1000 milliLiter(s) (50 mL/Hr) IV Continuous <Continuous>  famotidine    Tablet 20 milliGRAM(s) Oral every 48 hours  ferrous    sulfate Liquid 300 milliGRAM(s) Enteral Tube daily  formoterol for nebulization 20 MICROGram(s) Nebulizer two times a day  gabapentin   Solution 300 milliGRAM(s) Oral daily  hydrocortisone 1% Cream 1 Application(s) Topical once  insulin lispro (HumaLOG) corrective regimen sliding scale   SubCutaneous every 6 hours  levothyroxine Injectable 60 MICROGram(s) IV Push at bedtime  Medical Marijuana Awake Oil 2 milliLiter(s),Medical Marijuana Awake Oil 2 milliLiter(s) 2 milliLiter(s) Enteral Tube <User Schedule>  Medical Marijuana Calm Oil 2 milliLiter(s),Medical [place] Calm Oil 2 milliLiter(s) 2 milliLiter(s) Enteral Tube <User Schedule>  Medical Marijuana [place] Oil 2 milliLiter(s),Medical Marijuana [place] Oil 2 milliLiter(s) 2 milliLiter(s) Enteral Tube <User Schedule>  metoprolol tartrate 12.5 milliGRAM(s) Oral two times a day  multivitamin/minerals/iron Oral Solution (CENTRUM) 15 milliLiter(s) Oral daily  predniSONE   Tablet 40 milliGRAM(s) Oral daily  QUEtiapine 25 milliGRAM(s) Oral at bedtime  sevelamer carbonate Powder 800 milliGRAM(s) Oral three times a day with meals  sodium chloride 0.9%. 1000 milliLiter(s) (50 mL/Hr) IV Continuous <Continuous>  tiZANidine 4 milliGRAM(s) Oral <User Schedule>  zinc sulfate 220 milliGRAM(s) Oral daily    VITALS:  T(C): , Max: 36.9 (08-01-19 @ 13:00)  T(F): , Max: 98.4 (08-01-19 @ 13:00)  HR: 110 (08-02-19 @ 09:00)  BP: 119/69 (08-02-19 @ 09:00)  BP(mean): --  RR: 20 (08-02-19 @ 09:00)  SpO2: 100% (08-02-19 @ 09:00)  Wt(kg): --  I and O's:    08-01 @ 07:01  -  08-02 @ 07:00  --------------------------------------------------------  IN: 950 mL / OUT: 490 mL / NET: 460 mL    REVIEW OF SYSTEMS:  UTO    PHYSICAL EXAM:  Constitutional: frail, cachectic  Respiratory: coarse B/L  Cardiovascular: S1 and S2  Gastrointestinal: + BS, soft, + PEG  Extremities: + edema, contracted  Neurological: non-communicative   : + gavin      LABS:                        7.5    13.86 )-----------( 175      ( 02 Aug 2019 09:06 )             22.3     08-02    129<L>  |  88<L>  |  146<H>  ----------------------------<  175<H>  4.0   |  20<L>  |  3.69<H>    Ca    8.2<L>      02 Aug 2019 06:23  Phos  6.6     08-02  Mg     2.1     08-02    RADIOLOGY    < from: Xray Chest 1 View- PORTABLE-Routine (08.01.19 @ 13:14) >  IMPRESSION:     Bilateral pleural effusions.    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, left knee fracture, DM, RLE DVT, p/w dislodged PEG tube s/p replacement of PEG by IR c/b TRANG   - TRANG: multifactorial with solitary kidney - likely ATN - non-oliguric - BUN elevated in part due to steroids   - metabolic acidosis with high anion gap: from TRANG + sepsis - stable  - hyponatremia: due to hypervolemia and TRANG -  Na+ declining   - hypervolemic on exam  - anemia: of chronic dz - hgb trending down  - hyperphosphatemia: due to CKD/TRANG - on binders    RECOMMEND:  - palliative care consultation to determine GOC  - consider decrease steroids to 15 mg po daily, defer to pulm  - monitor strict IOs  - continue TF with nepro as able via PEG per GI  - continue Renvela powder 800 mg TID via PEG  - maintain chronic gavin catheter   - dose medication for a GFR ~ 10-15 mL/min    Laura Farr, NP  [facility]  [phone] NEPHROLOGY    Patient seen and examined.  No sig events noted    MEDICATIONS  (STANDING):  acetylcysteine 10%  Inhalation 5 milliLiter(s) Inhalation three times a day  ALBUTerol/ipratropium for Nebulization 3 milliLiter(s) Nebulizer every 6 hours  apixaban 2.5 milliGRAM(s) Oral two times a day  artificial tears (preservative free) Ophthalmic Solution 1 Drop(s) Both EYES four times a day  BACItracin   Ointment 1 Application(s) Topical two times a day  chlorhexidine 2% Cloths 1 Application(s) Topical daily  clonazePAM  Tablet 1 milliGRAM(s) Oral at bedtime  Dakins Solution - 1/4 Strength 1 Application(s) Topical two times a day  dextrose 5%. 1000 milliLiter(s) (50 mL/Hr) IV Continuous <Continuous>  famotidine    Tablet 20 milliGRAM(s) Oral every 48 hours  ferrous    sulfate Liquid 300 milliGRAM(s) Enteral Tube daily  formoterol for nebulization 20 MICROGram(s) Nebulizer two times a day  gabapentin   Solution 300 milliGRAM(s) Oral daily  hydrocortisone 1% Cream 1 Application(s) Topical once  insulin lispro (HumaLOG) corrective regimen sliding scale   SubCutaneous every 6 hours  levothyroxine Injectable 60 MICROGram(s) IV Push at bedtime  Medical Marijuana Awake Oil 2 milliLiter(s),Medical Marijuana Awake Oil 2 milliLiter(s) 2 milliLiter(s) Enteral Tube <User Schedule>  Medical Marijuana Calm Oil 2 milliLiter(s),Medical [place] Calm Oil 2 milliLiter(s) 2 milliLiter(s) Enteral Tube <User Schedule>  Medical Marijuana [place] Oil 2 milliLiter(s),Medical Marijuana [place] Oil 2 milliLiter(s) 2 milliLiter(s) Enteral Tube <User Schedule>  metoprolol tartrate 12.5 milliGRAM(s) Oral two times a day  multivitamin/minerals/iron Oral Solution (CENTRUM) 15 milliLiter(s) Oral daily  predniSONE   Tablet 40 milliGRAM(s) Oral daily  QUEtiapine 25 milliGRAM(s) Oral at bedtime  sevelamer carbonate Powder 800 milliGRAM(s) Oral three times a day with meals  sodium chloride 0.9%. 1000 milliLiter(s) (50 mL/Hr) IV Continuous <Continuous>  tiZANidine 4 milliGRAM(s) Oral <User Schedule>  zinc sulfate 220 milliGRAM(s) Oral daily    VITALS:  T(C): , Max: 36.9 (08-01-19 @ 13:00)  T(F): , Max: 98.4 (08-01-19 @ 13:00)  HR: 110 (08-02-19 @ 09:00)  BP: 119/69 (08-02-19 @ 09:00)  BP(mean): --  RR: 20 (08-02-19 @ 09:00)  SpO2: 100% (08-02-19 @ 09:00)  Wt(kg): --  I and O's:    08-01 @ 07:01  -  08-02 @ 07:00  --------------------------------------------------------  IN: 950 mL / OUT: 490 mL / NET: 460 mL    REVIEW OF SYSTEMS:  UTO    PHYSICAL EXAM:  Constitutional: frail, cachectic  Respiratory: coarse B/L  Cardiovascular: S1 and S2  Gastrointestinal: + BS, soft, + PEG  Extremities: + edema, contracted  Neurological: non-communicative   : + gavin      LABS:                        7.5    13.86 )-----------( 175      ( 02 Aug 2019 09:06 )             22.3     08-02    129<L>  |  88<L>  |  146<H>  ----------------------------<  175<H>  4.0   |  20<L>  |  3.69<H>    Ca    8.2<L>      02 Aug 2019 06:23  Phos  6.6     08-02  Mg     2.1     08-02    RADIOLOGY    < from: Xray Chest 1 View- PORTABLE-Routine (08.01.19 @ 13:14) >  IMPRESSION:     Bilateral pleural effusions.    < end of copied text >

## 2019-08-02 NOTE — PROGRESS NOTE ADULT - ASSESSMENT
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, left knee fracture, DM, RLE DVT, p/w dislodged PEG tube s/p replacement of PEG by IR c/b TRANG   - TRANG: multifactorial with solitary kidney - likely ATN - non-oliguric - BUN elevated in part due to steroids   - metabolic acidosis with high anion gap: from TRANG + sepsis - stable  - hyponatremia: due to hypervolemia and TRANG -  Na+ declining   - hypervolemic on exam  - anemia: of chronic dz - hgb trending down  - hyperphosphatemia: due to CKD/TRANG - on binders    RECOMMEND:  - palliative care consultation to determine GOC  - consider decrease steroids to 15 mg po daily, defer to pulm  - monitor strict IOs  - continue TF with nepro as able via PEG per GI  - continue Renvela powder 800 mg TID via PEG  - maintain chronic gavin catheter   - dose medication for a GFR ~ 10-15 mL/min    Laura Farr, NP  [facility]  [phone]

## 2019-08-02 NOTE — PROGRESS NOTE ADULT - ASSESSMENT
69 year old female with multiple prolonged hospitalizations with dementia She is nonverbal, dysphagia, had  PEG tube, contractures, chronic Blackman catheter,  sacral decubitus , She had  IR to have PEG replaced on 6/25/19  She has DVT on AC  WBC trended down blood cultures NEG 7/31    Culture - Urine (07.13.19 @ 10:20) >=3 organisms. Probable collection contamination.    Recommendations   Continue G tube feeds   Keep G tube bumper at 2 cm richard on G tube.    Keep site clean/ dry   Ongoing sacral wound care     [facility]   Covering [facility]  [phone]  After hours and weekend coverage [phone]

## 2019-08-02 NOTE — PROGRESS NOTE ADULT - SUBJECTIVE AND OBJECTIVE BOX
INTERVAL HPI/OVERNIGHT EVENTS:  Patient positioned in bed for comfort and safety, spouse bedside. reports patient had a BM last evening     MEDICATIONS  (STANDING):  acetylcysteine 10%  Inhalation 5 milliLiter(s) Inhalation three times a day  ALBUTerol/ipratropium for Nebulization 3 milliLiter(s) Nebulizer every 6 hours  apixaban 2.5 milliGRAM(s) Oral two times a day  artificial tears (preservative free) Ophthalmic Solution 1 Drop(s) Both EYES four times a day  BACItracin   Ointment 1 Application(s) Topical two times a day  chlorhexidine 2% Cloths 1 Application(s) Topical daily  clonazePAM  Tablet 1 milliGRAM(s) Oral at bedtime  Dakins Solution - 1/4 Strength 1 Application(s) Topical two times a day  dextrose 5%. 1000 milliLiter(s) (50 mL/Hr) IV Continuous <Continuous>  famotidine    Tablet 20 milliGRAM(s) Oral every 48 hours  ferrous    sulfate Liquid 300 milliGRAM(s) Enteral Tube daily  formoterol for nebulization 20 MICROGram(s) Nebulizer two times a day  gabapentin   Solution 300 milliGRAM(s) Oral daily  hydrocortisone 1% Cream 1 Application(s) Topical once  insulin lispro (HumaLOG) corrective regimen sliding scale   SubCutaneous every 6 hours  levothyroxine Injectable 60 MICROGram(s) IV Push at bedtime  Medical Marijuana Awake Oil 2 milliLiter(s),Medical Marijuana Awake Oil 2 milliLiter(s) 2 milliLiter(s) Enteral Tube <User Schedule>  Medical Marijuana Calm Oil 2 milliLiter(s),Medical [place] Calm Oil 2 milliLiter(s) 2 milliLiter(s) Enteral Tube <User Schedule>  Medical Marijuana [place] Oil 2 milliLiter(s),Medical Marijuana [place] Oil 2 milliLiter(s) 2 milliLiter(s) Enteral Tube <User Schedule>  metoprolol tartrate 12.5 milliGRAM(s) Oral two times a day  multivitamin/minerals/iron Oral Solution (CENTRUM) 15 milliLiter(s) Oral daily  predniSONE   Tablet 40 milliGRAM(s) Oral daily  QUEtiapine 25 milliGRAM(s) Oral at bedtime  sevelamer carbonate Powder 800 milliGRAM(s) Oral three times a day with meals  sodium chloride 0.9%. 1000 milliLiter(s) (50 mL/Hr) IV Continuous <Continuous>  tiZANidine 4 milliGRAM(s) Oral <User Schedule>  zinc sulfate 220 milliGRAM(s) Oral daily    MEDICATIONS  (PRN):  acetaminophen  Suppository .. 650 milliGRAM(s) Rectal every 6 hours PRN Temp greater or equal to 38C (100.4F), Mild Pain (1 - 3)  carboxymethylcellulose 0.5% (Preservative-Free) Ophthalmic Solution 1 Drop(s) Both EYES three times a day PRN dry eyes  glucagon  Injectable 1 milliGRAM(s) IntraMuscular once PRN Glucose LESS THAN 70 milligrams/deciliter  nystatin Powder 1 Application(s) Topical three times a day PRN under breasts      Allergies    ASA; dye contrast (Anaphylaxis)  aspirin (Short breath)  divalproex sodium (Other (Unknown))  Flowers and Plants (Short breath)  Haldol (Other (Unknown))  penicillin (Short breath; Rash)  sulfa drugs (Short breath; Rash)  vancomycin (Rash; Urticaria; Hives)  Xanax (Other (Unknown))    Intolerances        Review of Systems:  Unable to obtain from patient   General:  No fever or chills    ENT:  nasal canula , + dysphagia  Resp:  No or  wheezing  GI: tolerating G tube feeds at 25cc/hr       Vital Signs Last 24 Hrs  T(C): 36.6 (02 Aug 2019 09:00), Max: 36.9 (01 Aug 2019 13:00)  T(F): 97.9 (02 Aug 2019 09:00), Max: 98.4 (01 Aug 2019 13:00)  HR: 110 (02 Aug 2019 09:00) (90 - 110)  BP: 119/69 (02 Aug 2019 09:00) (102/63 - 127/63)  BP(mean): --  RR: 20 (02 Aug 2019 09:00) (18 - 21)  SpO2: 100% (02 Aug 2019 09:00) (96% - 100%)    PHYSICAL EXAM:    Constitutional: contracted female , non toxic and NAD  Neck: contractures   Respiratory: on nasal canula , mild labored   Cardiovascular: S1 and S2, RRR, no murmur  GI: G tube site with out drainage , External G tube bumper remains at 2cm skin level  Extremities:  contractures   Skin: No jaundice       LABS:                        7.5    13.86 )-----------( 175      ( 02 Aug 2019 09:06 )             22.3   Hemoglobin: 7.5 g/dL <L> [11.5 - 15.5] (08-02 @ 09:06)  Hemoglobin: 8.0 g/dL <L> [11.5 - 15.5] (08-01 @ 12:45)  Hemoglobin: 8.5 g/dL <L> [11.5 - 15.5] (07-31 @ 07:08)          Albumin, Serum: 2.5 g/dL <L> [3.3 - 5.0] (07-31 @ 07:08)  Aspartate Aminotransferase (AST/SGOT): 12 U/L [10 - 40] (07-31 @ 07:08)  Alanine Aminotransferase (ALT/SGPT): 7 U/L <L> [10 - 45] (07-31 @ 07:08)    08-02    129<L>  |  88<L>  |  146<H>  ----------------------------<  175<H>  4.0   |  20<L>  |  3.69<H>    Ca    8.2<L>      02 Aug 2019 06:23  Phos  6.6     08-02  Mg     2.1     08-02          LIVER FUNCTIONS - ( 31 Jul 2019 07:08 )  Alb: 2.5 g/dL / Pro: 5.3 g/dL / ALK PHOS: 79 U/L / ALT: 7 U/L / AST: 12 U/L / GGT: x             RADIOLOGY & ADDITIONAL TESTS:  < from: Xray Chest 1 View- PORTABLE-Routine (08.01.19 @ 13:14) >  EXAM:  XR CHEST PORTABLE ROUTINE 1V                            PROCEDURE DATE:  08/01/2019            INTERPRETATION:  CLINICAL INFORMATION: Coarse breath sounds and wheezing.    TECHNIQUE: Portable AP radiograph of the chest.    COMPARISON: Portable AP of the chest 7/31/2019.    FINDINGS:    Right upper extremity mid line catheter with tip terminating in the   axillary region.    Bilateral pleural effusions.    Heart size cannot be accurately evaluated on this projection.      Unremarkable skeletal structures.    Surgical clips in the right upper quadrant. Gastrostomy tube is noted in   place.      IMPRESSION:     Bilateral pleural effusions.      < end of copied text >

## 2019-08-02 NOTE — DIETITIAN INITIAL EVALUATION ADULT. - WEIGHT IN LBS
Problem: Fall Injury Risk  Goal: Absence of Fall and Fall-Related Injury  Outcome: Ongoing (interventions implemented as appropriate)  Room uncluttered and well lighted. Pt educated on using call light when wanting to get out of bed.       100 78.9 78

## 2019-08-02 NOTE — PROGRESS NOTE ADULT - SUBJECTIVE AND OBJECTIVE BOX
HPI:  Patient seen and examined at bedside on [address] with her daughters and aide at bedside.  BUN continues to rise. Creatinine remains markedly elevated.  Urine output continues.    Review Of Systems:  Unable to assess in the setting of advanced dementia.       Medications:  acetaminophen  Suppository .. 650 milliGRAM(s) Rectal every 6 hours PRN  ALBUTerol/ipratropium for Nebulization 3 milliLiter(s) Nebulizer every 6 hours  apixaban 2.5 milliGRAM(s) Oral two times a day  artificial tears (preservative free) Ophthalmic Solution 1 Drop(s) Both EYES four times a day  BACItracin   Ointment 1 Application(s) Topical two times a day  carboxymethylcellulose 0.5% (Preservative-Free) Ophthalmic Solution 1 Drop(s) Both EYES three times a day PRN  chlorhexidine 2% Cloths 1 Application(s) Topical daily  clonazePAM  Tablet 1 milliGRAM(s) Oral at bedtime  Dakins Solution - 1/4 Strength 1 Application(s) Topical two times a day  dextrose 5%. 1000 milliLiter(s) IV Continuous <Continuous>  famotidine    Tablet 20 milliGRAM(s) Oral every 48 hours  ferrous    sulfate Liquid 300 milliGRAM(s) Enteral Tube daily  formoterol for nebulization 20 MICROGram(s) Nebulizer two times a day  gabapentin   Solution 300 milliGRAM(s) Oral daily  glucagon  Injectable 1 milliGRAM(s) IntraMuscular once PRN  hydrocortisone 1% Cream 1 Application(s) Topical once  insulin lispro (HumaLOG) corrective regimen sliding scale   SubCutaneous every 6 hours  levothyroxine Injectable 60 MICROGram(s) IV Push at bedtime  Medical Marijuana Awake Oil 2 milliLiter(s),Medical Marijuana Awake Oil 2 milliLiter(s) 2 milliLiter(s) Enteral Tube <User Schedule>  Medical Marijuana Calm Oil 2 milliLiter(s),Medical [place] Calm Oil 2 milliLiter(s) 2 milliLiter(s) Enteral Tube <User Schedule>  Medical Marijuana [place] Oil 2 milliLiter(s),Medical Marijuana [place] Oil 2 milliLiter(s) 2 milliLiter(s) Enteral Tube <User Schedule>  metoprolol tartrate 12.5 milliGRAM(s) Oral two times a day  multivitamin/minerals/iron Oral Solution (CENTRUM) 15 milliLiter(s) Oral daily  nystatin Powder 1 Application(s) Topical three times a day PRN  predniSONE   Tablet 40 milliGRAM(s) Oral daily  QUEtiapine 25 milliGRAM(s) Oral at bedtime  sevelamer carbonate Powder 800 milliGRAM(s) Oral three times a day with meals  sodium chloride 0.9%. 1000 milliLiter(s) IV Continuous <Continuous>  tiZANidine 4 milliGRAM(s) Oral <User Schedule>  zinc sulfate 220 milliGRAM(s) Oral daily    PAST MEDICAL & SURGICAL HISTORY:  Type 2 diabetes mellitus  Dementia  DVT, lower extremity  CVA (cerebral vascular accident)  Fatty pancreas  PNA (pneumonia)  Pulmonary HTN  IGT (impaired glucose tolerance)  Ulcerative colitis  Acid reflux  Anxiety  Depression  Mouth sores  HLD (hyperlipidemia)  Asthma  S/P percutaneous endoscopic gastrostomy (PEG) tube placement: for dysphagia  Humeral head fracture  H/O: hysterectomy  H/O cataract extraction, left  History of knee replacement    Vitals:  T(C): 36.8 (08-02-19 @ 17:00), Max: 36.9 (08-02-19 @ 13:00)  HR: 108 (08-02-19 @ 17:00) (90 - 111)  BP: 139/87 (08-02-19 @ 17:00) (102/63 - 139/87)  BP(mean): --  RR: 20 (08-02-19 @ 17:00) (18 - 21)  SpO2: 100% (08-02-19 @ 17:00) (98% - 100%)  Wt(kg): --  Daily     Daily   I&O's Summary    01 Aug 2019 07:01  -  02 Aug 2019 07:00  --------------------------------------------------------  IN: 950 mL / OUT: 490 mL / NET: 460 mL    02 Aug 2019 07:01  -  02 Aug 2019 18:14  --------------------------------------------------------  IN: 550 mL / OUT: 155 mL / NET: 395 mL        Physical Exam:  Appearance: functional quadriplegia   Eyes: PERRLA, EOMI, pink conjunctiva, no scleral icterus   HENT: Normal oral mucosa  Cardiovascular: tachycardic S1, S2, no murmur, rub, or gallop; NO edema in hands or feet; no JVD  Procedural Access Site: Clean, dry, intact, without hematoma  Respiratory: bilateral air entry; poor inspiratory effort  Gastrointestinal: Soft, non-tender, non-distended, BS+, +PEG  Musculoskeletal: +contracted  Neurologic: functional quadriplegia   Lymphatic: No lymphadenopathy  Psychiatry: advanced dementia  Skin: multiple sites of skin breakdown, stage IV sacral decub                          7.5    13.86 )-----------( 175      ( 02 Aug 2019 09:06 )             22.3     08-02    129<L>  |  88<L>  |  146<H>  ----------------------------<  175<H>  4.0   |  20<L>  |  3.69<H>    Ca    8.2<L>      02 Aug 2019 06:23  Phos  6.6     08-02  Mg     2.1     08-02

## 2019-08-02 NOTE — PROGRESS NOTE ADULT - SUBJECTIVE AND OBJECTIVE BOX
---___---___---___---___---___---___ ---___---___---___---___---___---___---___---___---                  M E D I C A L   A T T E N D I N G   P R O G R E S S   N O T E  ---___---___---___---___---___---___ ---___---___---___---___---___---___---___---___---        ================================================    ++CHIEF COMPLAINT:   Patient is a 69y old  Female who presents with a chief complaint of PEG tube dislodgement, fever, leukocytosis (01 Aug 2019 20:06)    worsening renal  function an bun     ---___---___---___---___---___---  PAST MEDICAL / Surgical  HISTORY:  PAST MEDICAL & SURGICAL HISTORY:  Type 2 diabetes mellitus  Dementia  DVT, lower extremity  CVA (cerebral vascular accident)  Fatty pancreas  PNA (pneumonia)  Pulmonary HTN  IGT (impaired glucose tolerance)  Ulcerative colitis  Acid reflux  Anxiety  Depression  Mouth sores  HLD (hyperlipidemia)  Asthma  S/P percutaneous endoscopic gastrostomy (PEG) tube placement: for dysphagia  Humeral head fracture  H/O: hysterectomy  H/O cataract extraction, left  History of knee replacement      ---___---___---___---___---___---  FAMILY HISTORY:   FAMILY HISTORY:  Family history of dementia (Grandparent)  Family history of colon cancer (Grandparent)        ---___---___---___---___---___---  ALLERGIES:   Allergies    ASA; dye contrast (Anaphylaxis)  aspirin (Short breath)  divalproex sodium (Other (Unknown))  Flowers and Plants (Short breath)  Haldol (Other (Unknown))  penicillin (Short breath; Rash)  sulfa drugs (Short breath; Rash)  vancomycin (Rash; Urticaria; Hives)  Xanax (Other (Unknown))    Intolerances        ---___---___---___---___---___---  MEDICATIONS:  MEDICATIONS  (STANDING):  acetylcysteine 10%  Inhalation 5 milliLiter(s) Inhalation three times a day  ALBUTerol/ipratropium for Nebulization 3 milliLiter(s) Nebulizer every 6 hours  apixaban 2.5 milliGRAM(s) Oral two times a day  artificial tears (preservative free) Ophthalmic Solution 1 Drop(s) Both EYES four times a day  BACItracin   Ointment 1 Application(s) Topical two times a day  chlorhexidine 2% Cloths 1 Application(s) Topical daily  clonazePAM  Tablet 1 milliGRAM(s) Oral at bedtime  Dakins Solution - 1/4 Strength 1 Application(s) Topical two times a day  dextrose 5%. 1000 milliLiter(s) (50 mL/Hr) IV Continuous <Continuous>  famotidine    Tablet 20 milliGRAM(s) Oral every 48 hours  ferrous    sulfate Liquid 300 milliGRAM(s) Enteral Tube daily  formoterol for nebulization 20 MICROGram(s) Nebulizer two times a day  gabapentin   Solution 300 milliGRAM(s) Oral daily  hydrocortisone 1% Cream 1 Application(s) Topical once  insulin lispro (HumaLOG) corrective regimen sliding scale   SubCutaneous every 6 hours  levothyroxine Injectable 60 MICROGram(s) IV Push at bedtime  Medical Marijuana Awake Oil 2 milliLiter(s),Medical Marijuana Awake Oil 2 milliLiter(s) 2 milliLiter(s) Enteral Tube <User Schedule>  Medical Marijuana Calm Oil 2 milliLiter(s),Medical [place] Calm Oil 2 milliLiter(s) 2 milliLiter(s) Enteral Tube <User Schedule>  Medical Marijuana [place] Oil 2 milliLiter(s),Medical Marijuana [place] Oil 2 milliLiter(s) 2 milliLiter(s) Enteral Tube <User Schedule>  metoprolol tartrate 12.5 milliGRAM(s) Oral two times a day  multivitamin/minerals/iron Oral Solution (CENTRUM) 15 milliLiter(s) Oral daily  predniSONE   Tablet 40 milliGRAM(s) Oral daily  QUEtiapine 25 milliGRAM(s) Oral at bedtime  sevelamer carbonate Powder 800 milliGRAM(s) Oral three times a day with meals  sodium chloride 0.9%. 1000 milliLiter(s) (50 mL/Hr) IV Continuous <Continuous>  tiZANidine 4 milliGRAM(s) Oral <User Schedule>  zinc sulfate 220 milliGRAM(s) Oral daily    MEDICATIONS  (PRN):  acetaminophen  Suppository .. 650 milliGRAM(s) Rectal every 6 hours PRN Temp greater or equal to 38C (100.4F), Mild Pain (1 - 3)  carboxymethylcellulose 0.5% (Preservative-Free) Ophthalmic Solution 1 Drop(s) Both EYES three times a day PRN dry eyes  glucagon  Injectable 1 milliGRAM(s) IntraMuscular once PRN Glucose LESS THAN 70 milligrams/deciliter  nystatin Powder 1 Application(s) Topical three times a day PRN under breasts      ---___---___---___---___---___---  REVIEW OF SYSTEM:  nonverbal     ---___---___---___---___---___---  VITAL SIGNS:  69y , CAPILLARY BLOOD GLUCOSE      POCT Blood Glucose.: 175 mg/dL (02 Aug 2019 06:05)    T(C): 36.7 (19 @ 06:00), Max: 36.9 (19 @ 09:00)  HR: 102 (19 @ 06:00) (90 - 104)  BP: 102/63 (19 @ 06:00) (102/63 - 127/63)  RR: 18 (19 @ 06:00) (18 - 21)  SpO2: 98% (19 @ 06:00) (96% - 100%)  ---___---___---___---___---___---  PHYSICAL EXAM:    GEN: A&O X 0 , NAD , comfortable  HEENT: NCAT, PERRL, MMM, hearing intact  Neck: supple , no JVD  CVS: S1S2 , regular , No M/R/G appreciated  PULM: CTA B/L,  no W/R/R appreciated  ABD.: soft. non tender, non distended,  bowel sounds present peg noted   Extrem: intact pulses , no edema  left leg in brace and multiple contracture   Derm: multiple decubitus ulcers noted         ---___---___---___---___---___---            LAB AND IMAGIN.0    13.76 )-----------( 188      ( 01 Aug 2019 12:45 )             24.7               08-02    129<L>  |  88<L>  |  146<H>  ----------------------------<  175<H>  4.0   |  20<L>  |  3.69<H>    Ca    8.2<L>      02 Aug 2019 06:23  Phos  6.6     08-02  Mg     2.1     08-02                cxr b/l  pleural effusions                  [All pertinent / recent Imaging reviewed]         ---___---___---___---___---___---___ ---___---___---___---___---                         A S S E S S M E N T   A N D   P L A N :      HEALTH ISSUES - PROBLEM Dx:  TRANG (acute kidney injury): TRANG (acute kidney injury) renal following and to adjust fluid intake if necessary   dm2 contiue insulin   asthma  on budesonide and prednisone  sacral pressure ulcers  continue wound care   chronic pain  on medical marijuana and neurontin  agitation klonopin and seroquel   dementia supportive care    -GI/DVT Prophylaxis. on eliquis     --------------------------------------------  Case discussed with  spoke to him about hospice . it is a very difficult decision for him to make   Education given on   ___________________________  Thank you,  Deniz Bolden  [phone]

## 2019-08-03 LAB
ALBUMIN SERPL ELPH-MCNC: 2.5 G/DL — LOW (ref 3.3–5)
ALP SERPL-CCNC: 84 U/L — SIGNIFICANT CHANGE UP (ref 40–120)
ALT FLD-CCNC: 6 U/L — LOW (ref 10–45)
ANION GAP SERPL CALC-SCNC: 26 MMOL/L — HIGH (ref 5–17)
AST SERPL-CCNC: 11 U/L — SIGNIFICANT CHANGE UP (ref 10–40)
BILIRUB SERPL-MCNC: 0.2 MG/DL — SIGNIFICANT CHANGE UP (ref 0.2–1.2)
BUN SERPL-MCNC: 153 MG/DL — HIGH (ref 7–23)
CALCIUM SERPL-MCNC: 8.1 MG/DL — LOW (ref 8.4–10.5)
CHLORIDE SERPL-SCNC: 88 MMOL/L — LOW (ref 96–108)
CO2 SERPL-SCNC: 18 MMOL/L — LOW (ref 22–31)
CREAT SERPL-MCNC: 3.77 MG/DL — HIGH (ref 0.5–1.3)
GLUCOSE BLDC GLUCOMTR-MCNC: 147 MG/DL — HIGH (ref 70–99)
GLUCOSE BLDC GLUCOMTR-MCNC: 190 MG/DL — HIGH (ref 70–99)
GLUCOSE BLDC GLUCOMTR-MCNC: 216 MG/DL — HIGH (ref 70–99)
GLUCOSE BLDC GLUCOMTR-MCNC: 217 MG/DL — HIGH (ref 70–99)
GLUCOSE SERPL-MCNC: 201 MG/DL — HIGH (ref 70–99)
HCT VFR BLD CALC: 24.4 % — LOW (ref 34.5–45)
HGB BLD-MCNC: 7.9 G/DL — LOW (ref 11.5–15.5)
MCHC RBC-ENTMCNC: 29 PG — SIGNIFICANT CHANGE UP (ref 27–34)
MCHC RBC-ENTMCNC: 32.4 GM/DL — SIGNIFICANT CHANGE UP (ref 32–36)
MCV RBC AUTO: 89.7 FL — SIGNIFICANT CHANGE UP (ref 80–100)
PLATELET # BLD AUTO: 183 K/UL — SIGNIFICANT CHANGE UP (ref 150–400)
POTASSIUM SERPL-MCNC: 4.7 MMOL/L — SIGNIFICANT CHANGE UP (ref 3.5–5.3)
POTASSIUM SERPL-SCNC: 4.7 MMOL/L — SIGNIFICANT CHANGE UP (ref 3.5–5.3)
PROT SERPL-MCNC: 5.5 G/DL — LOW (ref 6–8.3)
RBC # BLD: 2.72 M/UL — LOW (ref 3.8–5.2)
RBC # FLD: 16.1 % — HIGH (ref 10.3–14.5)
SODIUM SERPL-SCNC: 132 MMOL/L — LOW (ref 135–145)
WBC # BLD: 12.19 K/UL — HIGH (ref 3.8–10.5)
WBC # FLD AUTO: 12.19 K/UL — HIGH (ref 3.8–10.5)

## 2019-08-03 RX ADMIN — Medication 1 APPLICATION(S): at 06:25

## 2019-08-03 RX ADMIN — Medication 60 MICROGRAM(S): at 22:25

## 2019-08-03 RX ADMIN — Medication 1 APPLICATION(S): at 18:04

## 2019-08-03 RX ADMIN — Medication 12.5 MILLIGRAM(S): at 06:23

## 2019-08-03 RX ADMIN — Medication 1: at 00:41

## 2019-08-03 RX ADMIN — SEVELAMER CARBONATE 800 MILLIGRAM(S): 2400 POWDER, FOR SUSPENSION ORAL at 09:13

## 2019-08-03 RX ADMIN — SEVELAMER CARBONATE 800 MILLIGRAM(S): 2400 POWDER, FOR SUSPENSION ORAL at 13:24

## 2019-08-03 RX ADMIN — Medication 2: at 13:25

## 2019-08-03 RX ADMIN — Medication 3 MILLILITER(S): at 18:02

## 2019-08-03 RX ADMIN — Medication 40 MILLIGRAM(S): at 06:23

## 2019-08-03 RX ADMIN — Medication 3 MILLILITER(S): at 00:41

## 2019-08-03 RX ADMIN — TIZANIDINE 4 MILLIGRAM(S): 4 TABLET ORAL at 21:00

## 2019-08-03 RX ADMIN — Medication 1 MILLIGRAM(S): at 22:10

## 2019-08-03 RX ADMIN — CHLORHEXIDINE GLUCONATE 1 APPLICATION(S): 213 SOLUTION TOPICAL at 13:25

## 2019-08-03 RX ADMIN — Medication 300 MILLIGRAM(S): at 13:24

## 2019-08-03 RX ADMIN — Medication 2: at 18:04

## 2019-08-03 RX ADMIN — Medication 1 APPLICATION(S): at 18:05

## 2019-08-03 RX ADMIN — Medication 1 DROP(S): at 06:24

## 2019-08-03 RX ADMIN — Medication 1 DROP(S): at 18:04

## 2019-08-03 RX ADMIN — APIXABAN 2.5 MILLIGRAM(S): 2.5 TABLET, FILM COATED ORAL at 18:04

## 2019-08-03 RX ADMIN — Medication 1 APPLICATION(S): at 06:23

## 2019-08-03 RX ADMIN — Medication 1 DROP(S): at 13:26

## 2019-08-03 RX ADMIN — ZINC SULFATE TAB 220 MG (50 MG ZINC EQUIVALENT) 220 MILLIGRAM(S): 220 (50 ZN) TAB at 13:24

## 2019-08-03 RX ADMIN — SEVELAMER CARBONATE 800 MILLIGRAM(S): 2400 POWDER, FOR SUSPENSION ORAL at 18:05

## 2019-08-03 RX ADMIN — Medication 15 MILLILITER(S): at 13:24

## 2019-08-03 RX ADMIN — Medication 3 MILLILITER(S): at 13:24

## 2019-08-03 RX ADMIN — TIZANIDINE 4 MILLIGRAM(S): 4 TABLET ORAL at 09:13

## 2019-08-03 RX ADMIN — Medication 1 DROP(S): at 06:23

## 2019-08-03 RX ADMIN — QUETIAPINE FUMARATE 25 MILLIGRAM(S): 200 TABLET, FILM COATED ORAL at 22:10

## 2019-08-03 RX ADMIN — Medication 20 MICROGRAM(S): at 06:25

## 2019-08-03 RX ADMIN — Medication 3 MILLILITER(S): at 06:23

## 2019-08-03 RX ADMIN — APIXABAN 2.5 MILLIGRAM(S): 2.5 TABLET, FILM COATED ORAL at 06:24

## 2019-08-03 RX ADMIN — Medication 12.5 MILLIGRAM(S): at 18:05

## 2019-08-03 RX ADMIN — GABAPENTIN 300 MILLIGRAM(S): 400 CAPSULE ORAL at 09:13

## 2019-08-03 RX ADMIN — Medication 20 MICROGRAM(S): at 18:04

## 2019-08-03 NOTE — PROGRESS NOTE ADULT - SUBJECTIVE AND OBJECTIVE BOX
NEPHROLOGY     Patient seen and examined.    MEDICATIONS  (STANDING):  ALBUTerol/ipratropium for Nebulization 3 milliLiter(s) Nebulizer every 6 hours  apixaban 2.5 milliGRAM(s) Oral two times a day  artificial tears (preservative free) Ophthalmic Solution 1 Drop(s) Both EYES four times a day  BACItracin   Ointment 1 Application(s) Topical two times a day  chlorhexidine 2% Cloths 1 Application(s) Topical daily  clonazePAM  Tablet 1 milliGRAM(s) Oral at bedtime  Dakins Solution - 1/4 Strength 1 Application(s) Topical two times a day  dextrose 5%. 1000 milliLiter(s) (50 mL/Hr) IV Continuous <Continuous>  famotidine    Tablet 20 milliGRAM(s) Oral every 48 hours  ferrous    sulfate Liquid 300 milliGRAM(s) Enteral Tube daily  formoterol for nebulization 20 MICROGram(s) Nebulizer two times a day  gabapentin   Solution 300 milliGRAM(s) Oral daily  hydrocortisone 1% Cream 1 Application(s) Topical once  insulin lispro (HumaLOG) corrective regimen sliding scale   SubCutaneous every 6 hours  levothyroxine Injectable 60 MICROGram(s) IV Push at bedtime  Medical Marijuana Awake Oil 2 milliLiter(s),Medical Marijuana Awake Oil 2 milliLiter(s) 2 milliLiter(s) Enteral Tube <User Schedule>  Medical Marijuana Calm Oil 2 milliLiter(s),Medical [place] Calm Oil 2 milliLiter(s) 2 milliLiter(s) Enteral Tube <User Schedule>  Medical Marijuana [place] Oil 2 milliLiter(s),Medical Marijuana [place] Oil 2 milliLiter(s) 2 milliLiter(s) Enteral Tube <User Schedule>  metoprolol tartrate 12.5 milliGRAM(s) Oral two times a day  multivitamin/minerals/iron Oral Solution (CENTRUM) 15 milliLiter(s) Oral daily  predniSONE   Tablet 40 milliGRAM(s) Oral daily  QUEtiapine 25 milliGRAM(s) Oral at bedtime  sevelamer carbonate Powder 800 milliGRAM(s) Oral three times a day with meals  sodium chloride 0.9%. 1000 milliLiter(s) (50 mL/Hr) IV Continuous <Continuous>  tiZANidine 4 milliGRAM(s) Oral <User Schedule>  zinc sulfate 220 milliGRAM(s) Oral daily    VITALS:  T(C): , Max: 36.9 (08-02-19 @ 13:00)  T(F): , Max: 98.4 (08-02-19 @ 13:00)  HR: 107 (08-03-19 @ 05:00)  BP: 122/74 (08-03-19 @ 05:00)  RR: 20 (08-03-19 @ 05:00)  SpO2: 100% (08-03-19 @ 05:00)    I and O's:    08-02 @ 07:01  -  08-03 @ 07:00  --------------------------------------------------------  IN: 1125 mL / OUT: 355 mL / NET: 770 mL    PHYSICAL EXAM:  Constitutional: frail, cachectic  Respiratory: coarse B/L  Cardiovascular: S1 and S2  Gastrointestinal: + BS, soft, + PEG  Extremities: + edema, contracted  Neurological: non-communicative   : + gavin      LABS:                        7.5    13.86 )-----------( 175      ( 02 Aug 2019 09:06 )             22.3     08-02    129<L>  |  88<L>  |  146<H>  ----------------------------<  175<H>  4.0   |  20<L>  |  3.69<H>    Ca    8.2<L>      02 Aug 2019 06:23  Phos  6.6     08-02  Mg     2.1     08-02 NEPHROLOGY     Patient seen and examined with spouse at bedside. No significant overnight events noted.      MEDICATIONS  (STANDING):  ALBUTerol/ipratropium for Nebulization 3 milliLiter(s) Nebulizer every 6 hours  apixaban 2.5 milliGRAM(s) Oral two times a day  artificial tears (preservative free) Ophthalmic Solution 1 Drop(s) Both EYES four times a day  BACItracin   Ointment 1 Application(s) Topical two times a day  chlorhexidine 2% Cloths 1 Application(s) Topical daily  clonazePAM  Tablet 1 milliGRAM(s) Oral at bedtime  Dakins Solution - 1/4 Strength 1 Application(s) Topical two times a day  dextrose 5%. 1000 milliLiter(s) (50 mL/Hr) IV Continuous <Continuous>  famotidine    Tablet 20 milliGRAM(s) Oral every 48 hours  ferrous    sulfate Liquid 300 milliGRAM(s) Enteral Tube daily  formoterol for nebulization 20 MICROGram(s) Nebulizer two times a day  gabapentin   Solution 300 milliGRAM(s) Oral daily  hydrocortisone 1% Cream 1 Application(s) Topical once  insulin lispro (HumaLOG) corrective regimen sliding scale   SubCutaneous every 6 hours  levothyroxine Injectable 60 MICROGram(s) IV Push at bedtime  Medical Marijuana Awake Oil 2 milliLiter(s),Medical Marijuana Awake Oil 2 milliLiter(s) 2 milliLiter(s) Enteral Tube <User Schedule>  Medical Marijuana Calm Oil 2 milliLiter(s),Medical [place] Calm Oil 2 milliLiter(s) 2 milliLiter(s) Enteral Tube <User Schedule>  Medical Marijuana [place] Oil 2 milliLiter(s),Medical Marijuana [place] Oil 2 milliLiter(s) 2 milliLiter(s) Enteral Tube <User Schedule>  metoprolol tartrate 12.5 milliGRAM(s) Oral two times a day  multivitamin/minerals/iron Oral Solution (CENTRUM) 15 milliLiter(s) Oral daily  predniSONE   Tablet 40 milliGRAM(s) Oral daily  QUEtiapine 25 milliGRAM(s) Oral at bedtime  sevelamer carbonate Powder 800 milliGRAM(s) Oral three times a day with meals  sodium chloride 0.9%. 1000 milliLiter(s) (50 mL/Hr) IV Continuous <Continuous>  tiZANidine 4 milliGRAM(s) Oral <User Schedule>  zinc sulfate 220 milliGRAM(s) Oral daily    VITALS:  T(C): , Max: 36.9 (08-02-19 @ 13:00)  T(F): , Max: 98.4 (08-02-19 @ 13:00)  HR: 107 (08-03-19 @ 05:00)  BP: 122/74 (08-03-19 @ 05:00)  RR: 20 (08-03-19 @ 05:00)  SpO2: 100% (08-03-19 @ 05:00)    I and O's:    08-02 @ 07:01  -  08-03 @ 07:00  --------------------------------------------------------  IN: 1125 mL / OUT: 355 mL / NET: 770 mL    PHYSICAL EXAM:  Constitutional: frail, cachectic  Respiratory: coarse B/L  Cardiovascular: S1 and S2  Gastrointestinal: + BS, soft, + PEG  Extremities: + edema, contracted  Neurological: non-communicative   : + gavin    LABS:                        7.5    13.86 )-----------( 175      ( 02 Aug 2019 09:06 )             22.3     08-02    129<L>  |  88<L>  |  146<H>  ----------------------------<  175<H>  4.0   |  20<L>  |  3.69<H>    Ca    8.2<L>      02 Aug 2019 06:23  Phos  6.6     08-02  Mg     2.1     08-02

## 2019-08-03 NOTE — CHART NOTE - NSCHARTNOTEFT_GEN_A_CORE
Discussed with Dr. Bolden to continue the airway clearance vest two times a day    Natalie Shoemaker PA-C  #[id number]

## 2019-08-03 NOTE — PROGRESS NOTE ADULT - SUBJECTIVE AND OBJECTIVE BOX
---___---___---___---___---___---___ ---___---___---___---___---___---___---___---___---                  M E D I C A L   A T T E N D I N G   P R O G R E S S   N O T E  ---___---___---___---___---___---___ ---___---___---___---___---___---___---___---___---        ================================================    ++CHIEF COMPLAINT:   Patient is a 69y old  Female who presents with a chief complaint of PEG tube dislodgement, fever, leukocytosis (03 Aug 2019 08:24)      Gastrostomy malfunction trang     ---___---___---___---___---___---  PAST MEDICAL / Surgical  HISTORY:  PAST MEDICAL & SURGICAL HISTORY:  Type 2 diabetes mellitus  Dementia  DVT, lower extremity  CVA (cerebral vascular accident)  Fatty pancreas  PNA (pneumonia)  Pulmonary HTN  IGT (impaired glucose tolerance)  Ulcerative colitis  Acid reflux  Anxiety  Depression  Mouth sores  HLD (hyperlipidemia)  Asthma  S/P percutaneous endoscopic gastrostomy (PEG) tube placement: for dysphagia  Humeral head fracture  H/O: hysterectomy  H/O cataract extraction, left  History of knee replacement      ---___---___---___---___---___---  FAMILY HISTORY:   FAMILY HISTORY:  Family history of dementia (Grandparent)  Family history of colon cancer (Grandparent)        ---___---___---___---___---___---  ALLERGIES:   Allergies    ASA; dye contrast (Anaphylaxis)  aspirin (Short breath)  divalproex sodium (Other (Unknown))  Flowers and Plants (Short breath)  Haldol (Other (Unknown))  penicillin (Short breath; Rash)  sulfa drugs (Short breath; Rash)  vancomycin (Rash; Urticaria; Hives)  Xanax (Other (Unknown))    Intolerances        ---___---___---___---___---___---  MEDICATIONS:  MEDICATIONS  (STANDING):  ALBUTerol/ipratropium for Nebulization 3 milliLiter(s) Nebulizer every 6 hours  apixaban 2.5 milliGRAM(s) Oral two times a day  artificial tears (preservative free) Ophthalmic Solution 1 Drop(s) Both EYES four times a day  BACItracin   Ointment 1 Application(s) Topical two times a day  chlorhexidine 2% Cloths 1 Application(s) Topical daily  clonazePAM  Tablet 1 milliGRAM(s) Oral at bedtime  Dakins Solution - 1/4 Strength 1 Application(s) Topical two times a day  dextrose 5%. 1000 milliLiter(s) (50 mL/Hr) IV Continuous <Continuous>  famotidine    Tablet 20 milliGRAM(s) Oral every 48 hours  ferrous    sulfate Liquid 300 milliGRAM(s) Enteral Tube daily  formoterol for nebulization 20 MICROGram(s) Nebulizer two times a day  gabapentin   Solution 300 milliGRAM(s) Oral daily  hydrocortisone 1% Cream 1 Application(s) Topical once  insulin lispro (HumaLOG) corrective regimen sliding scale   SubCutaneous every 6 hours  levothyroxine Injectable 60 MICROGram(s) IV Push at bedtime  Medical Marijuana Awake Oil 2 milliLiter(s),Medical Marijuana Awake Oil 2 milliLiter(s) 2 milliLiter(s) Enteral Tube <User Schedule>  Medical Marijuana Calm Oil 2 milliLiter(s),Medical [place] Calm Oil 2 milliLiter(s) 2 milliLiter(s) Enteral Tube <User Schedule>  Medical Marijuana [place] Oil 2 milliLiter(s),Medical Marijuana [place] Oil 2 milliLiter(s) 2 milliLiter(s) Enteral Tube <User Schedule>  metoprolol tartrate 12.5 milliGRAM(s) Oral two times a day  multivitamin/minerals/iron Oral Solution (CENTRUM) 15 milliLiter(s) Oral daily  predniSONE   Tablet 40 milliGRAM(s) Oral daily  QUEtiapine 25 milliGRAM(s) Oral at bedtime  sevelamer carbonate Powder 800 milliGRAM(s) Oral three times a day with meals  sodium chloride 0.9%. 1000 milliLiter(s) (50 mL/Hr) IV Continuous <Continuous>  tiZANidine 4 milliGRAM(s) Oral <User Schedule>  zinc sulfate 220 milliGRAM(s) Oral daily    MEDICATIONS  (PRN):  acetaminophen  Suppository .. 650 milliGRAM(s) Rectal every 6 hours PRN Temp greater or equal to 38C (100.4F), Mild Pain (1 - 3)  carboxymethylcellulose 0.5% (Preservative-Free) Ophthalmic Solution 1 Drop(s) Both EYES three times a day PRN dry eyes  glucagon  Injectable 1 milliGRAM(s) IntraMuscular once PRN Glucose LESS THAN 70 milligrams/deciliter  nystatin Powder 1 Application(s) Topical three times a day PRN under breasts      ---___---___---___---___---___---  REVIEW OF SYSTEM:  unable to obtain     ---___---___---___---___---___---  VITAL SIGNS:  69y , CAPILLARY BLOOD GLUCOSE      POCT Blood Glucose.: 147 mg/dL (03 Aug 2019 06:20)    T(C): 36.7 (19 @ 09:00), Max: 36.9 (19 @ 13:00)  HR: 104 (19 @ 09:00) (100 - 111)  BP: 134/80 (19 @ 09:00) (118/70 - 139/87)  RR: 18 (19 @ 09:00) (18 - 20)  SpO2: 100% (19 @ 09:00) (100% - 100%)  ---___---___---___---___---___---  PHYSICAL EXAM:    GEN: A&O X 0 , NAD , comfortable  HEENT: NCAT, PERRL, MMM, hearing intact  Neck: supple , no JVD  CVS: S1S2 , regular , No M/R/G appreciated  PULM: CTA B/L,  no W/R/R appreciated  ABD.: soft. non tender, non distended,  bowel sounds present peg noted   Extrem: intact pulses , no edema  contractures noted  Derm: No rash , no ecchymoses multiple pressure ulcers and decubitus noted         ---___---___---___---___---___---            LAB AND IMAGIN.5    13.86 )-----------( 175      ( 02 Aug 2019 09:06 )             22.3               08-02    129<L>  |  88<L>  |  146<H>  ----------------------------<  175<H>  4.0   |  20<L>  |  3.69<H>    Ca    8.2<L>      02 Aug 2019 06:23  Phos  6.6     08-  Mg     2.1     08-02                                  [All pertinent / recent Imaging reviewed]         ---___---___---___---___---___---___ ---___---___---___---___---                         A S S E S S M E N T   A N D   P L A N :      HEALTH ISSUES - PROBLEM Dx:  TRANG (acute kidney injury): TRANG (acute kidney injury)  renal folowing monitor cbc aNC CMP     DM2 ON NSULIN    chronic pain on medical marijuana and neurontin  agitation on seroquel and klonopin    anemia repeat cbc and transfuse if still low   continue iron     pressure ulcers continue wound care     asthma on budesonide                  -GI/DVT Prophylaxis. on  eliquis    --------------------------------------------  Case discussed with   fanstormy ready to discuss start of hospice  Education given on   ___________________________  Thank you,  Deniz Knutsonanette  [phone]

## 2019-08-03 NOTE — PROGRESS NOTE ADULT - ASSESSMENT
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, left knee fracture, DM, RLE DVT, p/w dislodged PEG tube s/p replacement of PEG by IR c/b TRANG   - TRANG: multifactorial with solitary kidney - likely ATN - non-oliguric - BUN elevated in part due to steroids   - metabolic acidosis with high anion gap: from TRANG + sepsis - stable  - hyponatremia: due to hypervolemia and TRANG -  Na+ declining   - hypervolemic on exam  - anemia: of chronic dz - hgb trending down  - hyperphosphatemia: due to CKD/TRANG - on binders    RECOMMEND:  - palliative care consultation to determine GOC  - consider decrease steroids to 15 mg po daily, defer to pulm  - monitor strict IOs  - continue TF with nepro as able via PEG per GI  - continue Renvela powder 800 mg TID via PEG  - maintain chronic gavin catheter   - dose medication for a GFR ~ 10-15 mL/min 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, left knee fracture, DM, RLE DVT, p/w dislodged PEG tube s/p replacement of PEG by IR c/b TRANG   - TRANG: multifactorial with solitary kidney - likely ATN - non-oliguric - BUN elevated in part due to steroids   - metabolic acidosis with high anion gap: from TRANG + sepsis - stable  - hyponatremia: due to hypervolemia and TRANG -  Na+ declining as of labs from yesterday, pending collection today  - hypervolemic on exam  - anemia: of chronic dz - hgb trending down as of labs from yesterday, pending collection today   - hyperphosphatemia: due to CKD/TRANG - on binders    RECOMMEND:  - palliative care consultation to determine GOC  - consider decrease steroids to 15 mg po daily, defer to pulm  - monitor strict IOs  - continue TF with nepro as able via PEG per GI  - continue Renvela powder 800 mg TID via PEG  - maintain chronic gavin catheter   - dose medication for a GFR ~ 10-15 mL/min    Ashley Olmedo NP-C  [employer]  [phone]

## 2019-08-04 LAB
ANION GAP SERPL CALC-SCNC: 24 MMOL/L — HIGH (ref 5–17)
BUN SERPL-MCNC: 155 MG/DL — HIGH (ref 7–23)
CALCIUM SERPL-MCNC: 8 MG/DL — LOW (ref 8.4–10.5)
CHLORIDE SERPL-SCNC: 88 MMOL/L — LOW (ref 96–108)
CO2 SERPL-SCNC: 19 MMOL/L — LOW (ref 22–31)
CREAT SERPL-MCNC: 3.74 MG/DL — HIGH (ref 0.5–1.3)
GLUCOSE BLDC GLUCOMTR-MCNC: 135 MG/DL — HIGH (ref 70–99)
GLUCOSE BLDC GLUCOMTR-MCNC: 149 MG/DL — HIGH (ref 70–99)
GLUCOSE BLDC GLUCOMTR-MCNC: 169 MG/DL — HIGH (ref 70–99)
GLUCOSE BLDC GLUCOMTR-MCNC: 186 MG/DL — HIGH (ref 70–99)
GLUCOSE BLDC GLUCOMTR-MCNC: 211 MG/DL — HIGH (ref 70–99)
GLUCOSE SERPL-MCNC: 135 MG/DL — HIGH (ref 70–99)
HCT VFR BLD CALC: 22.2 % — LOW (ref 34.5–45)
HGB BLD-MCNC: 7.2 G/DL — LOW (ref 11.5–15.5)
MCHC RBC-ENTMCNC: 29.4 PG — SIGNIFICANT CHANGE UP (ref 27–34)
MCHC RBC-ENTMCNC: 32.4 GM/DL — SIGNIFICANT CHANGE UP (ref 32–36)
MCV RBC AUTO: 90.6 FL — SIGNIFICANT CHANGE UP (ref 80–100)
PLATELET # BLD AUTO: 186 K/UL — SIGNIFICANT CHANGE UP (ref 150–400)
POTASSIUM SERPL-MCNC: 4.4 MMOL/L — SIGNIFICANT CHANGE UP (ref 3.5–5.3)
POTASSIUM SERPL-SCNC: 4.4 MMOL/L — SIGNIFICANT CHANGE UP (ref 3.5–5.3)
RBC # BLD: 2.45 M/UL — LOW (ref 3.8–5.2)
RBC # FLD: 15.9 % — HIGH (ref 10.3–14.5)
SODIUM SERPL-SCNC: 131 MMOL/L — LOW (ref 135–145)
WBC # BLD: 12.11 K/UL — HIGH (ref 3.8–10.5)
WBC # FLD AUTO: 12.11 K/UL — HIGH (ref 3.8–10.5)

## 2019-08-04 RX ORDER — HYDROMORPHONE HYDROCHLORIDE 2 MG/ML
1 INJECTION INTRAMUSCULAR; INTRAVENOUS; SUBCUTANEOUS EVERY 8 HOURS
Refills: 0 | Status: DISCONTINUED | OUTPATIENT
Start: 2019-08-04 | End: 2019-08-05

## 2019-08-04 RX ADMIN — Medication 20 MICROGRAM(S): at 22:32

## 2019-08-04 RX ADMIN — TIZANIDINE 4 MILLIGRAM(S): 4 TABLET ORAL at 09:05

## 2019-08-04 RX ADMIN — CHLORHEXIDINE GLUCONATE 1 APPLICATION(S): 213 SOLUTION TOPICAL at 11:42

## 2019-08-04 RX ADMIN — Medication 1 APPLICATION(S): at 05:26

## 2019-08-04 RX ADMIN — Medication 60 MICROGRAM(S): at 22:31

## 2019-08-04 RX ADMIN — Medication 1 APPLICATION(S): at 05:32

## 2019-08-04 RX ADMIN — HYDROMORPHONE HYDROCHLORIDE 1 MILLIGRAM(S): 2 INJECTION INTRAMUSCULAR; INTRAVENOUS; SUBCUTANEOUS at 21:27

## 2019-08-04 RX ADMIN — Medication 3 MILLILITER(S): at 11:40

## 2019-08-04 RX ADMIN — Medication 1: at 00:37

## 2019-08-04 RX ADMIN — Medication 12.5 MILLIGRAM(S): at 05:20

## 2019-08-04 RX ADMIN — Medication 1 DROP(S): at 11:41

## 2019-08-04 RX ADMIN — SEVELAMER CARBONATE 800 MILLIGRAM(S): 2400 POWDER, FOR SUSPENSION ORAL at 11:42

## 2019-08-04 RX ADMIN — Medication 40 MILLIGRAM(S): at 05:20

## 2019-08-04 RX ADMIN — Medication 3 MILLILITER(S): at 18:18

## 2019-08-04 RX ADMIN — SEVELAMER CARBONATE 800 MILLIGRAM(S): 2400 POWDER, FOR SUSPENSION ORAL at 09:03

## 2019-08-04 RX ADMIN — Medication 1 APPLICATION(S): at 18:20

## 2019-08-04 RX ADMIN — Medication 20 MICROGRAM(S): at 09:03

## 2019-08-04 RX ADMIN — HYDROMORPHONE HYDROCHLORIDE 1 MILLIGRAM(S): 2 INJECTION INTRAMUSCULAR; INTRAVENOUS; SUBCUTANEOUS at 22:30

## 2019-08-04 RX ADMIN — APIXABAN 2.5 MILLIGRAM(S): 2.5 TABLET, FILM COATED ORAL at 05:20

## 2019-08-04 RX ADMIN — Medication 300 MILLIGRAM(S): at 11:41

## 2019-08-04 RX ADMIN — GABAPENTIN 300 MILLIGRAM(S): 400 CAPSULE ORAL at 09:03

## 2019-08-04 RX ADMIN — Medication 1 APPLICATION(S): at 17:47

## 2019-08-04 RX ADMIN — FAMOTIDINE 20 MILLIGRAM(S): 10 INJECTION INTRAVENOUS at 11:40

## 2019-08-04 RX ADMIN — ZINC SULFATE TAB 220 MG (50 MG ZINC EQUIVALENT) 220 MILLIGRAM(S): 220 (50 ZN) TAB at 11:41

## 2019-08-04 RX ADMIN — Medication 3 MILLILITER(S): at 05:32

## 2019-08-04 RX ADMIN — Medication 2: at 12:04

## 2019-08-04 RX ADMIN — Medication 1: at 18:21

## 2019-08-04 RX ADMIN — SEVELAMER CARBONATE 800 MILLIGRAM(S): 2400 POWDER, FOR SUSPENSION ORAL at 18:18

## 2019-08-04 RX ADMIN — Medication 1 DROP(S): at 11:40

## 2019-08-04 RX ADMIN — Medication 15 MILLILITER(S): at 11:42

## 2019-08-04 RX ADMIN — TIZANIDINE 4 MILLIGRAM(S): 4 TABLET ORAL at 21:27

## 2019-08-04 RX ADMIN — APIXABAN 2.5 MILLIGRAM(S): 2.5 TABLET, FILM COATED ORAL at 18:19

## 2019-08-04 RX ADMIN — Medication 1 DROP(S): at 18:19

## 2019-08-04 RX ADMIN — Medication 12.5 MILLIGRAM(S): at 18:20

## 2019-08-04 NOTE — CHART NOTE - NSCHARTNOTEFT_GEN_A_CORE
Discussed with Dr. Bolden to start patient on dilaudid 1gm IV push q8h for mild-moderate pain  Will endorse to day team      Natalie Shoemaker PA-C  #[id number]

## 2019-08-04 NOTE — PROGRESS NOTE ADULT - SUBJECTIVE AND OBJECTIVE BOX
---___---___---___---___---___---___ ---___---___---___---___---___---___---___---___---                  M E D I C A L   A T T E N D I N G   P R O G R E S S   N O T E  ---___---___---___---___---___---___ ---___---___---___---___---___---___---___---___---        ================================================    ++CHIEF COMPLAINT:   Patient is a 69y old  Female who presents with a chief complaint of PEG tube dislodgement, fever, leukocytosis (04 Aug 2019 08:36)      trang       ---___---___---___---___---___---  PAST MEDICAL / Surgical  HISTORY:  PAST MEDICAL & SURGICAL HISTORY:  Type 2 diabetes mellitus  Dementia  DVT, lower extremity  CVA (cerebral vascular accident)  Fatty pancreas  PNA (pneumonia)  Pulmonary HTN  IGT (impaired glucose tolerance)  Ulcerative colitis  Acid reflux  Anxiety  Depression  Mouth sores  HLD (hyperlipidemia)  Asthma  S/P percutaneous endoscopic gastrostomy (PEG) tube placement: for dysphagia  Humeral head fracture  H/O: hysterectomy  H/O cataract extraction, left  History of knee replacement      ---___---___---___---___---___---  FAMILY HISTORY:   FAMILY HISTORY:  Family history of dementia (Grandparent)  Family history of colon cancer (Grandparent)        ---___---___---___---___---___---  ALLERGIES:   Allergies    ASA; dye contrast (Anaphylaxis)  aspirin (Short breath)  divalproex sodium (Other (Unknown))  Flowers and Plants (Short breath)  Haldol (Other (Unknown))  penicillin (Short breath; Rash)  sulfa drugs (Short breath; Rash)  vancomycin (Rash; Urticaria; Hives)  Xanax (Other (Unknown))    Intolerances        ---___---___---___---___---___---  MEDICATIONS:  MEDICATIONS  (STANDING):  ALBUTerol/ipratropium for Nebulization 3 milliLiter(s) Nebulizer every 6 hours  apixaban 2.5 milliGRAM(s) Oral two times a day  artificial tears (preservative free) Ophthalmic Solution 1 Drop(s) Both EYES four times a day  BACItracin   Ointment 1 Application(s) Topical two times a day  chlorhexidine 2% Cloths 1 Application(s) Topical daily  clonazePAM  Tablet 1 milliGRAM(s) Oral at bedtime  Dakins Solution - 1/4 Strength 1 Application(s) Topical two times a day  dextrose 5%. 1000 milliLiter(s) (50 mL/Hr) IV Continuous <Continuous>  famotidine    Tablet 20 milliGRAM(s) Oral every 48 hours  ferrous    sulfate Liquid 300 milliGRAM(s) Enteral Tube daily  formoterol for nebulization 20 MICROGram(s) Nebulizer two times a day  gabapentin   Solution 300 milliGRAM(s) Oral daily  hydrocortisone 1% Cream 1 Application(s) Topical once  insulin lispro (HumaLOG) corrective regimen sliding scale   SubCutaneous every 6 hours  levothyroxine Injectable 60 MICROGram(s) IV Push at bedtime  Medical Marijuana Awake Oil 2 milliLiter(s),Medical Marijuana Awake Oil 2 milliLiter(s) 2 milliLiter(s) Enteral Tube <User Schedule>  Medical Marijuana Calm Oil 2 milliLiter(s),Medical [place] Calm Oil 2 milliLiter(s) 2 milliLiter(s) Enteral Tube <User Schedule>  Medical Marijuana [place] Oil 2 milliLiter(s),Medical Marijuana [place] Oil 2 milliLiter(s) 2 milliLiter(s) Enteral Tube <User Schedule>  metoprolol tartrate 12.5 milliGRAM(s) Oral two times a day  multivitamin/minerals/iron Oral Solution (CENTRUM) 15 milliLiter(s) Oral daily  QUEtiapine 25 milliGRAM(s) Oral at bedtime  sevelamer carbonate Powder 800 milliGRAM(s) Oral three times a day with meals  sodium chloride 0.9%. 1000 milliLiter(s) (50 mL/Hr) IV Continuous <Continuous>  tiZANidine 4 milliGRAM(s) Oral <User Schedule>  zinc sulfate 220 milliGRAM(s) Oral daily    MEDICATIONS  (PRN):  acetaminophen  Suppository .. 650 milliGRAM(s) Rectal every 6 hours PRN Temp greater or equal to 38C (100.4F), Mild Pain (1 - 3)  carboxymethylcellulose 0.5% (Preservative-Free) Ophthalmic Solution 1 Drop(s) Both EYES three times a day PRN dry eyes  glucagon  Injectable 1 milliGRAM(s) IntraMuscular once PRN Glucose LESS THAN 70 milligrams/deciliter  nystatin Powder 1 Application(s) Topical three times a day PRN under breasts      ---___---___---___---___---___---  REVIEW OF SYSTEM:  unable to obtain  ---___---___---___---___---___---  VITAL SIGNS:  69y , CAPILLARY BLOOD GLUCOSE      POCT Blood Glucose.: 211 mg/dL (04 Aug 2019 11:59)    T(C): 36.6 (19 @ 13:00), Max: 37 (19 @ 22:06)  HR: 99 (19 @ 13:00) (99 - 109)  BP: 127/82 (19 @ 13:00) (117/66 - 151/88)  RR: 18 (19 @ 13:00) (18 - 20)  SpO2: 100% (19 @ 13:00) (99% - 100%)  ---___---___---___---___---___---  PHYSICAL EXAM:    GEN: A&O X 0 , NAD , comfortable  HEENT: NCAT, PERRL, MMM, hearing intact  Neck: supple , no JVD  CVS: S1S2 , regular , No M/R/G appreciated  PULM: CTA B/L,  no W/R/R appreciated  ABD.: soft. non tender, non distended,  bowel sounds present peg noted  Extrem: intact pulses , no edema contractures noted   Derm: No rash , no ecchymoses sacral decubitus noted      ---___---___---___---___---___---            LAB AND IMAGIN.2    12.11 )-----------( 186      ( 04 Aug 2019 09:19 )             22.2               08-04    131<L>  |  88<L>  |  155<H>  ----------------------------<  135<H>  4.4   |  19<L>  |  3.74<H>    Ca    8.0<L>      04 Aug 2019 06:07    TPro  5.5<L>  /  Alb  2.5<L>  /  TBili  0.2  /  DBili  x   /  AST  11  /  ALT  6<L>  /  AlkPhos  84  -                                [All pertinent / recent Imaging reviewed]         ---___---___---___---___---___---___ ---___---___---___---___---                         A S S E S S M E N T   A N D   P L A N :      HEALTH ISSUES - PROBLEM Dx:  TRANG (acute kidney injury): TRANG (acute kidney injury) continu to monitor levels  chronic fracture  continue pain meds   chronic pain continue pain meds  asthma contionue budesonide and prednisone  dm2on riss  chf  on coreg  chronic gavin  maintain inorder to keep wound clean  dysphagia on chronic peg  on eliquiss for dvt  -------------------------------------------  Case discussed with   Education given on   ___________________________  Thank you,  Deniz Bolden  [phone]

## 2019-08-04 NOTE — PROGRESS NOTE ADULT - SUBJECTIVE AND OBJECTIVE BOX
NEPHROLOGY     Patient seen and examined.    MEDICATIONS  (STANDING):  ALBUTerol/ipratropium for Nebulization 3 milliLiter(s) Nebulizer every 6 hours  apixaban 2.5 milliGRAM(s) Oral two times a day  artificial tears (preservative free) Ophthalmic Solution 1 Drop(s) Both EYES four times a day  BACItracin   Ointment 1 Application(s) Topical two times a day  chlorhexidine 2% Cloths 1 Application(s) Topical daily  clonazePAM  Tablet 1 milliGRAM(s) Oral at bedtime  Dakins Solution - 1/4 Strength 1 Application(s) Topical two times a day  dextrose 5%. 1000 milliLiter(s) (50 mL/Hr) IV Continuous <Continuous>  famotidine    Tablet 20 milliGRAM(s) Oral every 48 hours  ferrous    sulfate Liquid 300 milliGRAM(s) Enteral Tube daily  formoterol for nebulization 20 MICROGram(s) Nebulizer two times a day  gabapentin   Solution 300 milliGRAM(s) Oral daily  hydrocortisone 1% Cream 1 Application(s) Topical once  insulin lispro (HumaLOG) corrective regimen sliding scale   SubCutaneous every 6 hours  levothyroxine Injectable 60 MICROGram(s) IV Push at bedtime  Medical Marijuana Awake Oil 2 milliLiter(s),Medical Marijuana Awake Oil 2 milliLiter(s) 2 milliLiter(s) Enteral Tube <User Schedule>  Medical Marijuana Calm Oil 2 milliLiter(s),Medical [place] Calm Oil 2 milliLiter(s) 2 milliLiter(s) Enteral Tube <User Schedule>  Medical Marijuana [place] Oil 2 milliLiter(s),Medical Marijuana [place] Oil 2 milliLiter(s) 2 milliLiter(s) Enteral Tube <User Schedule>  metoprolol tartrate 12.5 milliGRAM(s) Oral two times a day  multivitamin/minerals/iron Oral Solution (CENTRUM) 15 milliLiter(s) Oral daily  QUEtiapine 25 milliGRAM(s) Oral at bedtime  sevelamer carbonate Powder 800 milliGRAM(s) Oral three times a day with meals  sodium chloride 0.9%. 1000 milliLiter(s) (50 mL/Hr) IV Continuous <Continuous>  tiZANidine 4 milliGRAM(s) Oral <User Schedule>  zinc sulfate 220 milliGRAM(s) Oral daily    VITALS:  T(C): , Max: 37 (08-03-19 @ 22:06)  T(F): , Max: 98.6 (08-03-19 @ 22:06)  HR: 105 (08-04-19 @ 05:17)  BP: 144/84 (08-04-19 @ 05:17)  RR: 18 (08-04-19 @ 05:17)  SpO2: 99% (08-04-19 @ 05:17)    I and O's:    08-03 @ 07:01  -  08-04 @ 07:00  --------------------------------------------------------  IN: 1050 mL / OUT: 490 mL / NET: 560 mL    PHYSICAL EXAM:  Constitutional: frail, cachectic  Respiratory: coarse B/L  Cardiovascular: S1 and S2  Gastrointestinal: + BS, soft, + PEG  Extremities: + edema, contracted  Neurological: non-communicative   : + gavin    LABS:                        7.9    12.19 )-----------( 183      ( 03 Aug 2019 18:10 )             24.4     08-04    131<L>  |  88<L>  |  155<H>  ----------------------------<  135<H>  4.4   |  19<L>  |  3.74<H>    Ca    8.0<L>      04 Aug 2019 06:07    TPro  5.5<L>  /  Alb  2.5<L>  /  TBili  0.2  /  DBili  x   /  AST  11  /  ALT  6<L>  /  AlkPhos  84  08-03 NEPHROLOGY     Patient seen and examined with spouse at bedside. No significant events overnight.     MEDICATIONS  (STANDING):  ALBUTerol/ipratropium for Nebulization 3 milliLiter(s) Nebulizer every 6 hours  apixaban 2.5 milliGRAM(s) Oral two times a day  artificial tears (preservative free) Ophthalmic Solution 1 Drop(s) Both EYES four times a day  BACItracin   Ointment 1 Application(s) Topical two times a day  chlorhexidine 2% Cloths 1 Application(s) Topical daily  clonazePAM  Tablet 1 milliGRAM(s) Oral at bedtime  Dakins Solution - 1/4 Strength 1 Application(s) Topical two times a day  dextrose 5%. 1000 milliLiter(s) (50 mL/Hr) IV Continuous <Continuous>  famotidine    Tablet 20 milliGRAM(s) Oral every 48 hours  ferrous    sulfate Liquid 300 milliGRAM(s) Enteral Tube daily  formoterol for nebulization 20 MICROGram(s) Nebulizer two times a day  gabapentin   Solution 300 milliGRAM(s) Oral daily  hydrocortisone 1% Cream 1 Application(s) Topical once  insulin lispro (HumaLOG) corrective regimen sliding scale   SubCutaneous every 6 hours  levothyroxine Injectable 60 MICROGram(s) IV Push at bedtime  Medical Marijuana Awake Oil 2 milliLiter(s),Medical Marijuana Awake Oil 2 milliLiter(s) 2 milliLiter(s) Enteral Tube <User Schedule>  Medical Marijuana Calm Oil 2 milliLiter(s),Medical [place] Calm Oil 2 milliLiter(s) 2 milliLiter(s) Enteral Tube <User Schedule>  Medical Marijuana [place] Oil 2 milliLiter(s),Medical Marijuana [place] Oil 2 milliLiter(s) 2 milliLiter(s) Enteral Tube <User Schedule>  metoprolol tartrate 12.5 milliGRAM(s) Oral two times a day  multivitamin/minerals/iron Oral Solution (CENTRUM) 15 milliLiter(s) Oral daily  QUEtiapine 25 milliGRAM(s) Oral at bedtime  sevelamer carbonate Powder 800 milliGRAM(s) Oral three times a day with meals  sodium chloride 0.9%. 1000 milliLiter(s) (50 mL/Hr) IV Continuous <Continuous>  tiZANidine 4 milliGRAM(s) Oral <User Schedule>  zinc sulfate 220 milliGRAM(s) Oral daily    VITALS:  T(C): , Max: 37 (08-03-19 @ 22:06)  T(F): , Max: 98.6 (08-03-19 @ 22:06)  HR: 105 (08-04-19 @ 05:17)  BP: 144/84 (08-04-19 @ 05:17)  RR: 18 (08-04-19 @ 05:17)  SpO2: 99% (08-04-19 @ 05:17)    I and O's:    08-03 @ 07:01  -  08-04 @ 07:00  --------------------------------------------------------  IN: 1050 mL / OUT: 490 mL / NET: 560 mL    PHYSICAL EXAM:  Constitutional: frail, cachectic  Respiratory: coarse B/L  Cardiovascular: S1 and S2  Gastrointestinal: + BS, soft, + PEG  Extremities: + edema, contracted  Neurological: non-communicative   : + gavin    LABS:                        7.9    12.19 )-----------( 183      ( 03 Aug 2019 18:10 )             24.4     08-04    131<L>  |  88<L>  |  155<H>  ----------------------------<  135<H>  4.4   |  19<L>  |  3.74<H>    Ca    8.0<L>      04 Aug 2019 06:07    TPro  5.5<L>  /  Alb  2.5<L>  /  TBili  0.2  /  DBili  x   /  AST  11  /  ALT  6<L>  /  AlkPhos  84  08-03

## 2019-08-04 NOTE — PROGRESS NOTE ADULT - ASSESSMENT
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, left knee fracture, DM, RLE DVT, p/w dislodged PEG tube s/p replacement of PEG by IR c/b TRANG   - TRANG: multifactorial with solitary kidney - likely ATN - non-oliguric - BUN elevated in part due to steroids   - metabolic acidosis with high anion gap: from TRANG + sepsis - stable  - hyponatremia: due to hypervolemia and TRANG -  Na+ improving though still low  - hypervolemic on exam  - anemia: of chronic dz - hgb trending down   - hyperphosphatemia: due to CKD/TRANG - on binders    RECOMMEND:  - palliative care consultation to determine GOC  - consider decrease steroids to 15 mg po daily, defer to pulm  - monitor strict IOs  - continue TF with nepro as able via PEG per GI  - continue Renvela powder 800 mg TID via PEG  - maintain chronic gavin catheter   - dose medication for a GFR ~ 10-15 mL/min    Ashley Olmedo NP-C  [employer]  [phone] 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, left knee fracture, DM, RLE DVT, p/w dislodged PEG tube s/p replacement of PEG by IR c/b TRANG   - TRANG: multifactorial with solitary kidney - likely ATN - non-oliguric - BUN elevated in part due to steroids   - metabolic acidosis with high anion gap: from TRANG + sepsis - stable  - hyponatremia: due to hypervolemia and TRANG -  Na+ improved though still low  - hypervolemic on exam  - anemia: of chronic dz - hgb trending down   - hyperphosphatemia: due to CKD/TRANG - on binders    RECOMMEND:  - palliative care consultation to determine GOC  - consider decrease steroids to 15 mg po daily, defer to pulm  - monitor strict IOs  - continue TF with nepro as able via PEG per GI  - continue Renvela powder 800 mg TID via PEG  - maintain chronic gavin catheter   - dose medication for a GFR ~ 10-15 mL/min    Ashley Olmedo NP-C  [employer]  [phone]

## 2019-08-05 DIAGNOSIS — F03.90 UNSPECIFIED DEMENTIA WITHOUT BEHAVIORAL DISTURBANCE: ICD-10-CM

## 2019-08-05 DIAGNOSIS — R52 PAIN, UNSPECIFIED: ICD-10-CM

## 2019-08-05 DIAGNOSIS — R53.2 FUNCTIONAL QUADRIPLEGIA: ICD-10-CM

## 2019-08-05 DIAGNOSIS — Z51.5 ENCOUNTER FOR PALLIATIVE CARE: ICD-10-CM

## 2019-08-05 DIAGNOSIS — R45.1 RESTLESSNESS AND AGITATION: ICD-10-CM

## 2019-08-05 LAB
ANION GAP SERPL CALC-SCNC: 25 MMOL/L — HIGH (ref 5–17)
BUN SERPL-MCNC: 160 MG/DL — HIGH (ref 7–23)
CALCIUM SERPL-MCNC: 7.9 MG/DL — LOW (ref 8.4–10.5)
CHLORIDE SERPL-SCNC: 87 MMOL/L — LOW (ref 96–108)
CO2 SERPL-SCNC: 20 MMOL/L — LOW (ref 22–31)
CREAT SERPL-MCNC: 3.88 MG/DL — HIGH (ref 0.5–1.3)
CULTURE RESULTS: SIGNIFICANT CHANGE UP
CULTURE RESULTS: SIGNIFICANT CHANGE UP
GLUCOSE BLDC GLUCOMTR-MCNC: 116 MG/DL — HIGH (ref 70–99)
GLUCOSE BLDC GLUCOMTR-MCNC: 128 MG/DL — HIGH (ref 70–99)
GLUCOSE BLDC GLUCOMTR-MCNC: 129 MG/DL — HIGH (ref 70–99)
GLUCOSE BLDC GLUCOMTR-MCNC: 136 MG/DL — HIGH (ref 70–99)
GLUCOSE SERPL-MCNC: 105 MG/DL — HIGH (ref 70–99)
HCT VFR BLD CALC: 23.5 % — LOW (ref 34.5–45)
HGB BLD-MCNC: 7.5 G/DL — LOW (ref 11.5–15.5)
MCHC RBC-ENTMCNC: 28.5 PG — SIGNIFICANT CHANGE UP (ref 27–34)
MCHC RBC-ENTMCNC: 31.9 GM/DL — LOW (ref 32–36)
MCV RBC AUTO: 89.4 FL — SIGNIFICANT CHANGE UP (ref 80–100)
PLATELET # BLD AUTO: 212 K/UL — SIGNIFICANT CHANGE UP (ref 150–400)
POTASSIUM SERPL-MCNC: 4.4 MMOL/L — SIGNIFICANT CHANGE UP (ref 3.5–5.3)
POTASSIUM SERPL-SCNC: 4.4 MMOL/L — SIGNIFICANT CHANGE UP (ref 3.5–5.3)
RBC # BLD: 2.63 M/UL — LOW (ref 3.8–5.2)
RBC # FLD: 15.8 % — HIGH (ref 10.3–14.5)
SODIUM SERPL-SCNC: 132 MMOL/L — LOW (ref 135–145)
SPECIMEN SOURCE: SIGNIFICANT CHANGE UP
SPECIMEN SOURCE: SIGNIFICANT CHANGE UP
WBC # BLD: 13.37 K/UL — HIGH (ref 3.8–10.5)
WBC # FLD AUTO: 13.37 K/UL — HIGH (ref 3.8–10.5)

## 2019-08-05 PROCEDURE — 99232 SBSQ HOSP IP/OBS MODERATE 35: CPT

## 2019-08-05 PROCEDURE — 99223 1ST HOSP IP/OBS HIGH 75: CPT | Mod: GC

## 2019-08-05 RX ORDER — HYDROMORPHONE HYDROCHLORIDE 2 MG/ML
0.3 INJECTION INTRAMUSCULAR; INTRAVENOUS; SUBCUTANEOUS EVERY 4 HOURS
Refills: 0 | Status: DISCONTINUED | OUTPATIENT
Start: 2019-08-05 | End: 2019-08-07

## 2019-08-05 RX ORDER — NALOXONE HYDROCHLORIDE 4 MG/.1ML
0.1 SPRAY NASAL
Refills: 0 | Status: DISCONTINUED | OUTPATIENT
Start: 2019-08-05 | End: 2019-08-07

## 2019-08-05 RX ORDER — HYDROMORPHONE HYDROCHLORIDE 2 MG/ML
0.2 INJECTION INTRAMUSCULAR; INTRAVENOUS; SUBCUTANEOUS EVERY 8 HOURS
Refills: 0 | Status: DISCONTINUED | OUTPATIENT
Start: 2019-08-05 | End: 2019-08-06

## 2019-08-05 RX ORDER — CLONAZEPAM 1 MG
1 TABLET ORAL AT BEDTIME
Refills: 0 | Status: DISCONTINUED | OUTPATIENT
Start: 2019-08-06 | End: 2019-08-08

## 2019-08-05 RX ADMIN — Medication 1 MILLIGRAM(S): at 22:52

## 2019-08-05 RX ADMIN — SEVELAMER CARBONATE 800 MILLIGRAM(S): 2400 POWDER, FOR SUSPENSION ORAL at 12:19

## 2019-08-05 RX ADMIN — SEVELAMER CARBONATE 800 MILLIGRAM(S): 2400 POWDER, FOR SUSPENSION ORAL at 09:09

## 2019-08-05 RX ADMIN — HYDROMORPHONE HYDROCHLORIDE 0.2 MILLIGRAM(S): 2 INJECTION INTRAMUSCULAR; INTRAVENOUS; SUBCUTANEOUS at 22:14

## 2019-08-05 RX ADMIN — Medication 3 MILLILITER(S): at 01:06

## 2019-08-05 RX ADMIN — Medication 20 MICROGRAM(S): at 06:50

## 2019-08-05 RX ADMIN — SEVELAMER CARBONATE 800 MILLIGRAM(S): 2400 POWDER, FOR SUSPENSION ORAL at 17:09

## 2019-08-05 RX ADMIN — Medication 20 MICROGRAM(S): at 20:14

## 2019-08-05 RX ADMIN — Medication 15 MILLILITER(S): at 12:19

## 2019-08-05 RX ADMIN — Medication 20 MICROGRAM(S): at 20:13

## 2019-08-05 RX ADMIN — Medication 1 DROP(S): at 17:09

## 2019-08-05 RX ADMIN — Medication 12.5 MILLIGRAM(S): at 17:10

## 2019-08-05 RX ADMIN — Medication 300 MILLIGRAM(S): at 12:19

## 2019-08-05 RX ADMIN — CHLORHEXIDINE GLUCONATE 1 APPLICATION(S): 213 SOLUTION TOPICAL at 13:20

## 2019-08-05 RX ADMIN — Medication 3 MILLILITER(S): at 23:57

## 2019-08-05 RX ADMIN — ZINC SULFATE TAB 220 MG (50 MG ZINC EQUIVALENT) 220 MILLIGRAM(S): 220 (50 ZN) TAB at 12:19

## 2019-08-05 RX ADMIN — Medication 1 APPLICATION(S): at 17:11

## 2019-08-05 RX ADMIN — Medication 3 MILLILITER(S): at 12:18

## 2019-08-05 RX ADMIN — Medication 3 MILLILITER(S): at 17:09

## 2019-08-05 RX ADMIN — APIXABAN 2.5 MILLIGRAM(S): 2.5 TABLET, FILM COATED ORAL at 06:51

## 2019-08-05 RX ADMIN — HYDROMORPHONE HYDROCHLORIDE 0.3 MILLIGRAM(S): 2 INJECTION INTRAMUSCULAR; INTRAVENOUS; SUBCUTANEOUS at 17:04

## 2019-08-05 RX ADMIN — Medication 1 APPLICATION(S): at 06:22

## 2019-08-05 RX ADMIN — QUETIAPINE FUMARATE 25 MILLIGRAM(S): 200 TABLET, FILM COATED ORAL at 22:52

## 2019-08-05 RX ADMIN — HYDROMORPHONE HYDROCHLORIDE 0.3 MILLIGRAM(S): 2 INJECTION INTRAMUSCULAR; INTRAVENOUS; SUBCUTANEOUS at 17:20

## 2019-08-05 RX ADMIN — Medication 1 APPLICATION(S): at 17:09

## 2019-08-05 RX ADMIN — APIXABAN 2.5 MILLIGRAM(S): 2.5 TABLET, FILM COATED ORAL at 17:10

## 2019-08-05 RX ADMIN — Medication 1 DROP(S): at 12:20

## 2019-08-05 RX ADMIN — Medication 12.5 MILLIGRAM(S): at 06:48

## 2019-08-05 RX ADMIN — Medication 60 MICROGRAM(S): at 21:57

## 2019-08-05 RX ADMIN — Medication 1 DROP(S): at 01:07

## 2019-08-05 RX ADMIN — Medication 3 MILLILITER(S): at 06:51

## 2019-08-05 RX ADMIN — TIZANIDINE 4 MILLIGRAM(S): 4 TABLET ORAL at 21:44

## 2019-08-05 RX ADMIN — HYDROMORPHONE HYDROCHLORIDE 0.2 MILLIGRAM(S): 2 INJECTION INTRAMUSCULAR; INTRAVENOUS; SUBCUTANEOUS at 21:44

## 2019-08-05 RX ADMIN — GABAPENTIN 300 MILLIGRAM(S): 400 CAPSULE ORAL at 17:12

## 2019-08-05 RX ADMIN — HYDROMORPHONE HYDROCHLORIDE 1 MILLIGRAM(S): 2 INJECTION INTRAMUSCULAR; INTRAVENOUS; SUBCUTANEOUS at 06:45

## 2019-08-05 RX ADMIN — TIZANIDINE 4 MILLIGRAM(S): 4 TABLET ORAL at 09:09

## 2019-08-05 NOTE — PROGRESS NOTE ADULT - SUBJECTIVE AND OBJECTIVE BOX
HPI:  Patient seen and examined at bedside on [address] with her daughter at bedside.    Review Of Systems:   Unable to assess in the setting of advanced dementia    Medications:  acetaminophen  Suppository .. 650 milliGRAM(s) Rectal every 6 hours PRN  ALBUTerol/ipratropium for Nebulization 3 milliLiter(s) Nebulizer every 6 hours  apixaban 2.5 milliGRAM(s) Oral two times a day  artificial tears (preservative free) Ophthalmic Solution 1 Drop(s) Both EYES four times a day  BACItracin   Ointment 1 Application(s) Topical two times a day  carboxymethylcellulose 0.5% (Preservative-Free) Ophthalmic Solution 1 Drop(s) Both EYES three times a day PRN  chlorhexidine 2% Cloths 1 Application(s) Topical daily  clonazePAM  Tablet 1 milliGRAM(s) Oral at bedtime  Dakins Solution - 1/4 Strength 1 Application(s) Topical two times a day  dextrose 5%. 1000 milliLiter(s) IV Continuous <Continuous>  famotidine    Tablet 20 milliGRAM(s) Oral every 48 hours  ferrous    sulfate Liquid 300 milliGRAM(s) Enteral Tube daily  formoterol for nebulization 20 MICROGram(s) Nebulizer two times a day  gabapentin   Solution 300 milliGRAM(s) Oral daily  glucagon  Injectable 1 milliGRAM(s) IntraMuscular once PRN  hydrocortisone 1% Cream 1 Application(s) Topical once  HYDROmorphone  Injectable 0.3 milliGRAM(s) IV Push every 4 hours PRN  HYDROmorphone  Injectable 0.2 milliGRAM(s) IV Push every 8 hours  insulin lispro (HumaLOG) corrective regimen sliding scale   SubCutaneous every 6 hours  levothyroxine Injectable 60 MICROGram(s) IV Push at bedtime  Medical Marijuana Awake Oil 2 milliLiter(s),Medical Marijuana Awake Oil 2 milliLiter(s) 2 milliLiter(s) Enteral Tube <User Schedule>  Medical Marijuana Calm Oil 2 milliLiter(s),Medical [place] Calm Oil 2 milliLiter(s) 2 milliLiter(s) Enteral Tube <User Schedule>  Medical Marijuana [place] Oil 2 milliLiter(s),Medical Marijuana [place] Oil 2 milliLiter(s) 2 milliLiter(s) Enteral Tube <User Schedule>  metoprolol tartrate 12.5 milliGRAM(s) Oral two times a day  multivitamin/minerals/iron Oral Solution (CENTRUM) 15 milliLiter(s) Oral daily  naloxone Injectable 0.1 milliGRAM(s) IV Push every 3 minutes  nystatin Powder 1 Application(s) Topical three times a day PRN  QUEtiapine 25 milliGRAM(s) Oral at bedtime  sevelamer carbonate Powder 800 milliGRAM(s) Oral three times a day with meals  sodium chloride 0.9%. 1000 milliLiter(s) IV Continuous <Continuous>  tiZANidine 4 milliGRAM(s) Oral <User Schedule>  zinc sulfate 220 milliGRAM(s) Oral daily    PAST MEDICAL & SURGICAL HISTORY:  Type 2 diabetes mellitus  Dementia  DVT, lower extremity  CVA (cerebral vascular accident)  Fatty pancreas  PNA (pneumonia)  Pulmonary HTN  IGT (impaired glucose tolerance)  Ulcerative colitis  Acid reflux  Anxiety  Depression  Mouth sores  HLD (hyperlipidemia)  Asthma  S/P percutaneous endoscopic gastrostomy (PEG) tube placement: for dysphagia  Humeral head fracture  H/O: hysterectomy  H/O cataract extraction, left  History of knee replacement    Vitals:  T(C): 36.7 (08-05-19 @ 17:00), Max: 36.8 (08-04-19 @ 21:00)  HR: 101 (08-05-19 @ 17:00) (100 - 109)  BP: 121/78 (08-05-19 @ 17:00) (117/79 - 150/82)  BP(mean): --  RR: 18 (08-05-19 @ 17:00) (18 - 18)  SpO2: 100% (08-05-19 @ 17:00) (98% - 100%)  Wt(kg): --  Daily     Daily   I&O's Summary    04 Aug 2019 07:01  -  05 Aug 2019 07:00  --------------------------------------------------------  IN: 275 mL / OUT: 450 mL / NET: -175 mL    05 Aug 2019 07:01  -  05 Aug 2019 19:59  --------------------------------------------------------  IN: 550 mL / OUT: 150 mL / NET: 400 mL        Physical Exam:  Appearance: functional quadriplegia   Eyes: PERRLA, EOMI, pink conjunctiva, no scleral icterus   HENT: Normal oral mucosa  Cardiovascular: tachycardic S1, S2, no murmur, rub, or gallop; NO edema in hands or feet; no JVD  Procedural Access Site: Clean, dry, intact, without hematoma  Respiratory: bilateral air entry; poor inspiratory effort  Gastrointestinal: Soft, non-tender, non-distended, BS+, +PEG  Musculoskeletal: +contracted  Neurologic: functional quadriplegia   Lymphatic: No lymphadenopathy  Psychiatry: advanced dementia  Skin: multiple sites of skin breakdown, stage IV sacral decub                          7.5    13.37 )-----------( 212      ( 05 Aug 2019 07:54 )             23.5     08-05    132<L>  |  87<L>  |  160<H>  ----------------------------<  105<H>  4.4   |  20<L>  |  3.88<H>    Ca    7.9<L>      05 Aug 2019 06:30

## 2019-08-05 NOTE — PROGRESS NOTE ADULT - ASSESSMENT
69 year old female with dementia who has had multiple prolonged hospitalizations  She has  dysphagia, has a G tube placed by IR on 6/25/19 and has been tolerating G tube feeds, Patient has  contractures, chronic gavin catheter, sacral decubitus , She has DVT on AC     Recommendations   Continue G tube feeds   Keep G tube bumper at 2 cm richard on G tube.    Keep site clean/ dry       Lakeisha Alicia ANP-BC   Covering [facility]  [phone]  After hours and weekend coverage [phone]

## 2019-08-05 NOTE — PROGRESS NOTE ADULT - ASSESSMENT
Patient is 69 year-old woman with advanced dementia and functional quadriplegia presents with fevers, leukocytosis in the setting of PEG dislodgement. Patient with acute kidney injury that is likely multifactorial.  Renal function continues to worsen.    Plan for discharge to home with Hospice.    Emotional support provided for family at bedside.

## 2019-08-05 NOTE — PROGRESS NOTE ADULT - PROBLEM SELECTOR PLAN 5
Pt with end stage dementia FAST score 7D and PPSv2 of 10%  - family leaning towards comfort care Pt with end stage dementia FAST score 7D and PPSv2 of 10%  - family leaning towards hospice

## 2019-08-05 NOTE — PROGRESS NOTE ADULT - ASSESSMENT
69 y.o female with multiple comorbidities, advanced dementia, bed bound, multiple pressure ulcers, presented with Dislodged PEG tube is now s/p PEG replacement by IR. Her hospital course was complicated by TRANG. Nephology follow pt, TRANG likely multifactorial, pt is non-oliguric, Cr uptrending. Pt also noted to have hypervolemic hyponatremia, Na slowly improving. PT was also treated for sepsis 2/2 aspiration PNA, completed full course of meropenem per ID recs. Palliative care consulted for C

## 2019-08-05 NOTE — PROGRESS NOTE ADULT - PROBLEM SELECTOR PLAN 6
discussed at length with Mr Caleb Colon Pt's  and HCP. He is leaning towards comfort care. Family meeting arranged for tomorrow At*** to discuss GOC and Hospice. He would like his daughter to be present for the meeting. discussed at length with Mr Caleb Colon Pt's  and HCP. He is leaning towards comfort care. Family meeting arranged for tomorrow At 10 am to discuss GOC and Hospice. He would like his daughter to be present for the meeting. discussed at length with Mr Caleb Colon, Pt's  and decision maker. He is leaning towards hospice care. Family meeting arranged for tomorrow At 10 am to discuss GOC and Hospice. He would like his daughter to be present for the meeting.

## 2019-08-05 NOTE — PROGRESS NOTE ADULT - PROBLEM SELECTOR PLAN 1
c/w gabapentin and Tylenol   - Pt with TRANG and worsening Cr, given she is a geriatric patient with altered pharmacodynamics rec Dilaudid 0.3 mg Q4h PRN fro 24 hr  - Pain AD score provided to team   - will transition to Fentanyl patch tomorrow based on 24hr requirement of above. c/w gabapentin and Tylenol   - Pt with TRANG and worsening Cr, given she is a geriatric patient with altered pharmacodynamics   - Dilaudid 0.2 mg Q8h and Dilaudid 0.3 mg Q4h PRN for break through  - Pain AD score provided to team   - will transition to Fentanyl patch tomorrow based on 24hr requirement of above. - Patient received Dilaudid 2 mg over 24 hours.   - TRANG and patient's age are affecting Pharmacokinetics therefore will adjust Dilaudid to Dilaudid 0.3 mg Q4h PRN for break through pain.  - Will also start Dilaudid 0.2 mg Q8h ATC  - If using frequent PRNs, will consider transition to Fentanyl patch if needed based on 24hr requirement.   - On medical Marijuana   - c/w gabapentin but consider decreasing it to 200 mg q daily due to eGFR   - Continue Tylenol   - Pain AD score provided to team

## 2019-08-05 NOTE — PROGRESS NOTE ADULT - PROBLEM SELECTOR PLAN 3
noted to have multiple pressure ulcers  - c/w with would care per primary team   - c/w leslye noted to have multiple pressure ulcers  - c/w wound care per primary team   - c/w leslye noted to have multiple pressure ulcers  -PPS < 20%   - Patient needs full nursing care  - c/w wound care per primary team   - c/w leslye

## 2019-08-05 NOTE — PROGRESS NOTE ADULT - PROBLEM SELECTOR PLAN 4
worsening Cr, pt is non-oliguric  - c/w gavin  - Nephrology rec appreciated With worsening Cr  and not a candidate for HD.   Renal note from 8/2 indicated " seems more attentive to the idea that she is doing poorly and likely slowly dying  No HD  No IVF for now ."

## 2019-08-05 NOTE — CHART NOTE - NSCHARTNOTEFT_GEN_A_CORE
Notified by RN for new onset of rash on LE since last night on 8/4. Patient seen and examined at bedside with presence of family members. Patient in NAD. Per family, patient remains at her baseline, no scratching or grimacing noted with family.   Smooth, slightly elevated erythematous papules localized on b/l LE with chronic edema.  Per chart review and RN, Dilaudid is only new medication for patient. Signs and symptoms of anaphylaxis were reviewed with RN and family. Will not DC Dilaudid yet as rash is localized and pt not in distress, and remain at her baseline. HD stable. Pending hospice eval for GOC tomorrow in AM. Primary team to consider Dermatology consult if rash worsens. Will continue to closely monitor patient overnight and endorse to primary team in AM.     Rosamaria Stiles PA-C

## 2019-08-05 NOTE — PROGRESS NOTE ADULT - SUBJECTIVE AND OBJECTIVE BOX
---___---___---___---___---___---___ ---___---___---___---___---___---___---___---___---                  M E D I C A L   A T T E N D I N G   P R O G R E S S   N O T E  ---___---___---___---___---___---___ ---___---___---___---___---___---___---___---___---        ================================================    ++CHIEF COMPLAINT:   Patient is a 69y old  Female who presents with a chief complaint of PEG tube dislodgement, fever, leukocytosis (04 Aug 2019 14:58)    worsening trang     ---___---___---___---___---___---  PAST MEDICAL / Surgical  HISTORY:  PAST MEDICAL & SURGICAL HISTORY:  Type 2 diabetes mellitus  Dementia  DVT, lower extremity  CVA (cerebral vascular accident)  Fatty pancreas  PNA (pneumonia)  Pulmonary HTN  IGT (impaired glucose tolerance)  Ulcerative colitis  Acid reflux  Anxiety  Depression  Mouth sores  HLD (hyperlipidemia)  Asthma  S/P percutaneous endoscopic gastrostomy (PEG) tube placement: for dysphagia  Humeral head fracture  H/O: hysterectomy  H/O cataract extraction, left  History of knee replacement      ---___---___---___---___---___---  FAMILY HISTORY:   FAMILY HISTORY:  Family history of dementia (Grandparent)  Family history of colon cancer (Grandparent)        ---___---___---___---___---___---  ALLERGIES:   Allergies    ASA; dye contrast (Anaphylaxis)  aspirin (Short breath)  divalproex sodium (Other (Unknown))  Flowers and Plants (Short breath)  Haldol (Other (Unknown))  penicillin (Short breath; Rash)  sulfa drugs (Short breath; Rash)  vancomycin (Rash; Urticaria; Hives)  Xanax (Other (Unknown))    Intolerances        ---___---___---___---___---___---  MEDICATIONS:  MEDICATIONS  (STANDING):  ALBUTerol/ipratropium for Nebulization 3 milliLiter(s) Nebulizer every 6 hours  apixaban 2.5 milliGRAM(s) Oral two times a day  artificial tears (preservative free) Ophthalmic Solution 1 Drop(s) Both EYES four times a day  BACItracin   Ointment 1 Application(s) Topical two times a day  chlorhexidine 2% Cloths 1 Application(s) Topical daily  clonazePAM  Tablet 1 milliGRAM(s) Oral at bedtime  Dakins Solution - 1/4 Strength 1 Application(s) Topical two times a day  dextrose 5%. 1000 milliLiter(s) (50 mL/Hr) IV Continuous <Continuous>  famotidine    Tablet 20 milliGRAM(s) Oral every 48 hours  ferrous    sulfate Liquid 300 milliGRAM(s) Enteral Tube daily  formoterol for nebulization 20 MICROGram(s) Nebulizer two times a day  gabapentin   Solution 300 milliGRAM(s) Oral daily  hydrocortisone 1% Cream 1 Application(s) Topical once  insulin lispro (HumaLOG) corrective regimen sliding scale   SubCutaneous every 6 hours  levothyroxine Injectable 60 MICROGram(s) IV Push at bedtime  Medical Marijuana Awake Oil 2 milliLiter(s),Medical Marijuana Awake Oil 2 milliLiter(s) 2 milliLiter(s) Enteral Tube <User Schedule>  Medical Marijuana Calm Oil 2 milliLiter(s),Medical Marinjuana Calm Oil 2 milliLiter(s) 2 milliLiter(s) Enteral Tube <User Schedule>  Medical Marijuana [place] Oil 2 milliLiter(s),Medical Marijuana [place] Oil 2 milliLiter(s) 2 milliLiter(s) Enteral Tube <User Schedule>  metoprolol tartrate 12.5 milliGRAM(s) Oral two times a day  multivitamin/minerals/iron Oral Solution (CENTRUM) 15 milliLiter(s) Oral daily  QUEtiapine 25 milliGRAM(s) Oral at bedtime  sevelamer carbonate Powder 800 milliGRAM(s) Oral three times a day with meals  sodium chloride 0.9%. 1000 milliLiter(s) (50 mL/Hr) IV Continuous <Continuous>  tiZANidine 4 milliGRAM(s) Oral <User Schedule>  zinc sulfate 220 milliGRAM(s) Oral daily    MEDICATIONS  (PRN):  acetaminophen  Suppository .. 650 milliGRAM(s) Rectal every 6 hours PRN Temp greater or equal to 38C (100.4F), Mild Pain (1 - 3)  carboxymethylcellulose 0.5% (Preservative-Free) Ophthalmic Solution 1 Drop(s) Both EYES three times a day PRN dry eyes  glucagon  Injectable 1 milliGRAM(s) IntraMuscular once PRN Glucose LESS THAN 70 milligrams/deciliter  HYDROmorphone  Injectable 1 milliGRAM(s) IV Push every 8 hours PRN Moderate Pain (4 - 6)  nystatin Powder 1 Application(s) Topical three times a day PRN under breasts      ---___---___---___---___---___---  REVIEW OF SYSTEM:  nonverbal    ---___---___---___---___---___---  VITAL SIGNS:  69y , CAPILLARY BLOOD GLUCOSE      POCT Blood Glucose.: 116 mg/dL (05 Aug 2019 06:05)    T(C): 36.6 (19 @ 05:00), Max: 37.1 (19 @ 18:15)  HR: 102 (19 @ 05:00) (99 - 109)  BP: 131/84 (19 @ 05:00) (120/66 - 150/82)  RR: 18 (19 @ 05:00) (18 - 18)  SpO2: 98% (19 @ 05:00) (98% - 100%)  ---___---___---___---___---___---  PHYSICAL EXAM:    GEN: A&O X 0 , NAD , comfortable  HEENT: NCAT, PERRL, MMM, hearing intact  Neck: supple , no JVD  CVS: S1S2 , regular , No M/R/G appreciated  PULM: CTA B/L,  no W/R/R appreciated  ABD.: soft. non tender, non distended,  bowel sounds present peg noted   Extrem: intact pulses , no edema  contracted and left leg in brace  Derm: No rash , no ecchymoses multiple decubitus ulcers noted         ---___---___---___---___---___---            LAB AND IMAGIN.2    12.11 )-----------( 186      ( 04 Aug 2019 09:19 )             22.2               08-    132<L>  |  87<L>  |  160<H>  ----------------------------<  105<H>  4.4   |  20<L>  |  3.88<H>    Ca    7.9<L>      05 Aug 2019 06:30    TPro  5.5<L>  /  Alb  2.5<L>  /  TBili  0.2  /  DBili  x   /  AST  11  /  ALT  6<L>  /  AlkPhos  84  -                                [All pertinent / recent Imaging reviewed]         ---___---___---___---___---___---___ ---___---___---___---___---                         A S S E S S M E N T   A N D   P L A N :      HEALTH ISSUES - PROBLEM Dx:  TRANG (acute kidney injury): TRANG (acute kidney injury) continuing to worsen  renal on board   chronic fracture left leg continue pain medication  chf systolic 20 % ef  on coreg  dysphagia continue peg feeds  chronic pain continue medical marijuana and neurontin  agitation on klonopin and seroquel  dm2 on insulin     -GI/DVT Prophylaxis.    --------------------------------------------  Case discussed with  kendra torey yesterday they agreed to meet with hospice today  for goc   Education given on   ___________________________  Thank you,  Deniz Bolden  [phone]

## 2019-08-05 NOTE — PROGRESS NOTE ADULT - SUBJECTIVE AND OBJECTIVE BOX
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  thinks she must have pulled it out this morning.   Hospital course: Pt presented with Dislodged PEG tube is now s/p PEG replacement by IR. Her hospital course was complicated by TRANG. Nephology follow pt, TRANG likely multifactorial, pt is non-oliguric, Cr uptrending. Pt also noted to have hypervolemic hyponatremia, Na slowly improving. PT was also treated for sepsis 2/2 aspiration PNA, completed full course of meropenem per ID recs.       PERTINENT PM/SXH:   Type 2 diabetes mellitus  Dementia  DVT, lower extremity  CVA (cerebral vascular accident)  Fatty pancreas  Fatty liver  PNA (pneumonia)  Pulmonary HTN  IGT (impaired glucose tolerance)  Ulcerative colitis  Acid reflux  Anxiety  Depression  Mouth sores  HLD (hyperlipidemia)  Asthma    S/P percutaneous endoscopic gastrostomy (PEG) tube placement  Humeral head fracture  H/O: hysterectomy  H/O cataract extraction, left  History of knee replacement    FAMILY HISTORY:  Family history of dementia (Grandparent)  Family history of colon cancer (Grandparent)    ITEMS NOT CHECKED ARE NOT PRESENT    SOCIAL HISTORY:   Significant other/partner:  [ ]  Children:  [ ]  Episcopal/Spirituality:  Substance hx:  [ ]   Tobacco hx:  [ ]   Alcohol hx: [ ]   Home Opioid hx:  [ ] I-Stop Reference No:  Living Situation: [ ]Home  [ ]Long term care  [ ]Rehab [ ]Other    ADVANCE DIRECTIVES:    DNR  MOLST  [ ]  Living Will  [ ]   DECISION MAKER(s):  [ ] Health Care Proxy(s)  [ ] Surrogate(s)  [ ] Guardian           Name(s): Phone Number(s):    BASELINE (I)ADL(s) (prior to admission):  [place]: [ ]Total  [ ] Moderate [ ]Dependent    Allergies    ASA; dye contrast (Anaphylaxis)  aspirin (Short breath)  divalproex sodium (Other (Unknown))  Flowers and Plants (Short breath)  Haldol (Other (Unknown))  penicillin (Short breath; Rash)  sulfa drugs (Short breath; Rash)  vancomycin (Rash; Urticaria; Hives)  Xanax (Other (Unknown))    Intolerances    MEDICATIONS  (STANDING):  ALBUTerol/ipratropium for Nebulization 3 milliLiter(s) Nebulizer every 6 hours  apixaban 2.5 milliGRAM(s) Oral two times a day  artificial tears (preservative free) Ophthalmic Solution 1 Drop(s) Both EYES four times a day  BACItracin   Ointment 1 Application(s) Topical two times a day  chlorhexidine 2% Cloths 1 Application(s) Topical daily  clonazePAM  Tablet 1 milliGRAM(s) Oral at bedtime  Dakins Solution - 1/4 Strength 1 Application(s) Topical two times a day  dextrose 5%. 1000 milliLiter(s) (50 mL/Hr) IV Continuous <Continuous>  famotidine    Tablet 20 milliGRAM(s) Oral every 48 hours  ferrous    sulfate Liquid 300 milliGRAM(s) Enteral Tube daily  formoterol for nebulization 20 MICROGram(s) Nebulizer two times a day  gabapentin   Solution 300 milliGRAM(s) Oral daily  hydrocortisone 1% Cream 1 Application(s) Topical once  insulin lispro (HumaLOG) corrective regimen sliding scale   SubCutaneous every 6 hours  levothyroxine Injectable 60 MICROGram(s) IV Push at bedtime  Medical Marijuana Awake Oil 2 milliLiter(s),Medical Marijuana Awake Oil 2 milliLiter(s) 2 milliLiter(s) Enteral Tube <User Schedule>  Medical Marijuana Calm Oil 2 milliLiter(s),Medical [place]juana Calm Oil 2 milliLiter(s) 2 milliLiter(s) Enteral Tube <User Schedule>  Medical Marijuana [place] Oil 2 milliLiter(s),Medical Marijuana [place] Oil 2 milliLiter(s) 2 milliLiter(s) Enteral Tube <User Schedule>  metoprolol tartrate 12.5 milliGRAM(s) Oral two times a day  multivitamin/minerals/iron Oral Solution (CENTRUM) 15 milliLiter(s) Oral daily  QUEtiapine 25 milliGRAM(s) Oral at bedtime  sevelamer carbonate Powder 800 milliGRAM(s) Oral three times a day with meals  sodium chloride 0.9%. 1000 milliLiter(s) (50 mL/Hr) IV Continuous <Continuous>  tiZANidine 4 milliGRAM(s) Oral <User Schedule>  zinc sulfate 220 milliGRAM(s) Oral daily    MEDICATIONS  (PRN):  acetaminophen  Suppository .. 650 milliGRAM(s) Rectal every 6 hours PRN Temp greater or equal to 38C (100.4F), Mild Pain (1 - 3)  carboxymethylcellulose 0.5% (Preservative-Free) Ophthalmic Solution 1 Drop(s) Both EYES three times a day PRN dry eyes  glucagon  Injectable 1 milliGRAM(s) IntraMuscular once PRN Glucose LESS THAN 70 milligrams/deciliter  HYDROmorphone  Injectable 1 milliGRAM(s) IV Push every 8 hours PRN Moderate Pain (4 - 6)  nystatin Powder 1 Application(s) Topical three times a day PRN under breasts    PRESENT SYMPTOMS: [ ]Unable to obtain due to poor mentation   Source if other than patient:  [ ]Family   [ ]Team     Pain: [ ] yes [ ] no  QOL impact -   Location -                    Aggravating factors -  Quality -  Radiation -  Timing-  Severity (0-10 scale):  Minimal acceptable level (0-10 scale):     PAIN AD Score:     [email or web address] (press ctrl +  left click to view)    Dyspnea:                           [ ]Mild [ ]Moderate [ ]Severe  Anxiety:                             [ ]Mild [ ]Moderate [ ]Severe  Fatigue:                             [ ]Mild [ ]Moderate [ ]Severe  Nausea:                             [ ]Mild [ ]Moderate [ ]Severe  Loss of appetite:              [ ]Mild [ ]Moderate [ ]Severe  Constipation:                    [ ]Mild [ ]Moderate [ ]Severe    Other Symptoms:  [ ]All other review of systems negative     Karnofsky Performance Score/Palliative Performance Status Version 2:         %    http://npcrc.org/files/news/palliative_performance_scale_[email or web address].pdf  PHYSICAL EXAM:  Vital Signs Last 24 Hrs  T(C): 36.5 (05 Aug 2019 09:00), Max: 37.1 (04 Aug 2019 18:15)  T(F): 97.7 (05 Aug 2019 09:00), Max: 98.7 (04 Aug 2019 18:15)  HR: 100 (05 Aug 2019 09:00) (99 - 109)  BP: 117/79 (05 Aug 2019 09:00) (117/79 - 150/82)  BP(mean): --  RR: 18 (05 Aug 2019 09:00) (18 - 18)  SpO2: 100% (05 Aug 2019 09:00) (98% - 100%) I&O's Summary    04 Aug 2019 07:01  -  05 Aug 2019 07:00  --------------------------------------------------------  IN: 275 mL / OUT: 450 mL / NET: -175 mL    GENERAL:  [x ]Alert  [ ]Oriented x   [ ]Lethargic  [x ]Cachexia  [ ]Unarousable  [ ]Verbal  [x ]Non-Verbal  Behavioral:   [ ] Anxiety  [ ] Delirium [ ] Agitation [ ] Other  HEENT:  [ ]Normal   [x ]Dry mouth   [ ]ET Tube/Trach  [ ]Oral lesions  PULMONARY:   [ ]Clear [ ]Tachypnea  [x ]Audible excessive secretions   [ ]Rhonchi        [ ]Right [ ]Left [ ]Bilateral  [x ]Crackles        [ ]Right [ ]Left [x ]Bilateral  [ ]Wheezing     [ ]Right [ ]Left [ ]Bilateral  CARDIOVASCULAR:    [ x]Regular [ ]Irregular [ ]Tachy  [ ]Sourav [ ]Murmur [ ]Other  GASTROINTESTINAL:  [ x]Soft  [ ]Distended   [ x]+BS  [ ]Non tender [ ]Tender  [x ]PEG [ ]OGT/ NGT  Last BM:   GENITOURINARY:  [ ]Normal [ ] Incontinent   [ ]Oliguria/Anuria   [x ]Blackman  MUSCULOSKELETAL:   [ ]Normal   [ ]Weakness  [x ]Bed/Wheelchair bound [ ]Edema  NEUROLOGIC:   [ ]No focal deficits  [x ] Cognitive impairment  [x ] Dysphagia [ ]Dysarthria [ ] Paresis [ ]Other   SKIN:   [ ]Normal   [x ]Pressure ulcer(s)  [ ]Rash    CRITICAL CARE:  [ ] Shock Present  [ ]Septic [ ]Cardiogenic [ ]Neurologic [ ]Hypovolemic  [ ]  Vasopressors [ ]  Inotropes   [ ] Respiratory failure present [ ] mechanical ventilation [ ] non-invasive ventilatory support [ ] High flow  [ ] Acute  [ ] Chronic [ ] Hypoxic  [ ] Hypercarbic [ ] Other  [ ] Other organ failure     LABS:                        7.5    13.37 )-----------( 212      ( 05 Aug 2019 07:54 )             23.5   08-05    132<L>  |  87<L>  |  160<H>  ----------------------------<  105<H>  4.4   |  20<L>  |  3.88<H>    Ca    7.9<L>      05 Aug 2019 06:30    TPro  5.5<L>  /  Alb  2.5<L>  /  TBili  0.2  /  DBili  x   /  AST  11  /  ALT  6<L>  /  AlkPhos  84  08-03        RADIOLOGY & ADDITIONAL STUDIES:    PROTEIN CALORIE MALNUTRITION PRESENT: [ ] Yes [ ] No  [x ] PPSV2 < or = to 30% [ ] significant weight loss  [ ] poor nutritional intake [ ] catabolic state [ ] anasarca     Albumin, Serum: 2.5 g/dL (08-03-19 @ 13:47)  Artificial Nutrition [ ]     REFERRALS:   [x ]Chaplaincy  [x ] Hospice  [ ]Child Life  [ ]Social Work  [ ]Case management [ ]Holistic Therapy     Goals of Care Document:   Care Coordination Assessment [C. Provider] (06-25-19 @ 09:39)   Progress Notes - Care Coordination [C. Provider] (08-01-19 @ 12:37) Initial Consult Note.   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   Hospital course: Pt presented with Dislodged PEG tube is now s/p PEG replacement by IR. Her hospital course was complicated by TRANG. Nephology follow pt, TRANG likely multifactorial, pt is non-oliguric, Cr uptrending. Pt also noted to have hypervolemic hyponatremia, Na slowly improving. PT was also treated for sepsis 2/2 aspiration PNA, completed full course of meropenem per ID recs.     Palliative care was called for GOC and hospice.       PERTINENT PM/SXH:   Type 2 diabetes mellitus  Dementia  DVT, lower extremity  CVA (cerebral vascular accident)  Fatty pancreas  Fatty liver  PNA (pneumonia)  Pulmonary HTN  IGT (impaired glucose tolerance)  Ulcerative colitis  Acid reflux  Anxiety  Depression  Mouth sores  HLD (hyperlipidemia)  Asthma    S/P percutaneous endoscopic gastrostomy (PEG) tube placement  Humeral head fracture  H/O: hysterectomy  H/O cataract extraction, left  History of knee replacement    FAMILY HISTORY:  Family history of dementia (Grandparent)  Family history of colon cancer (Grandparent)    ITEMS NOT CHECKED ARE NOT PRESENT    SOCIAL HISTORY:   Significant other/partner:    [ ]  Children: 3 [ ]  Alevism/Spirituality:  Substance hx: No  [ ]   Tobacco hx:  [ ]   Alcohol hx: [ ]   Home Opioid hx: No   [ ] I-Stop Reference No:  Living Situation: [x ]Home  [ ]Long term care  [ ]Rehab [ ]Other  As per care coordination notes: " Patients resides with spouse in a private house with 3  steps to enter. Patient is bedbound, completely dependent with ADL,s. Pt has  private HHA 4-8hrs 7 days a week and HHA M-F 9-1 from Medicare, family assist  after. Patient home oxygen probable tank and concentrator from [place].  Patient receives Peg tube feeds/supplies from [place]. Patient has pablo lift,  suction, W/C and hospital bed. Patient was receiving wound care from [facility] and receives doctor visit at home from Deniz Bolden  [phone]."     ADVANCE DIRECTIVES:    DNR  MOLST  [ ]  Living Will  [ ]   DECISION MAKER(s):  [ ] Health Care Proxy(s)  [x ] Surrogate(s)  [ ] Guardian           Name(s): Phone Number(s):      BASELINE (I)ADL(s) (prior to admission):  [place]: [ ]Total  [ ] Moderate [x ]Dependent    Allergies    ASA; dye contrast (Anaphylaxis)  aspirin (Short breath)  divalproex sodium (Other (Unknown))  Flowers and Plants (Short breath)  Haldol (Other (Unknown))  penicillin (Short breath; Rash)  sulfa drugs (Short breath; Rash)  vancomycin (Rash; Urticaria; Hives)  Xanax (Other (Unknown))    Intolerances    MEDICATIONS  (STANDING):  ALBUTerol/ipratropium for Nebulization 3 milliLiter(s) Nebulizer every 6 hours  apixaban 2.5 milliGRAM(s) Oral two times a day  artificial tears (preservative free) Ophthalmic Solution 1 Drop(s) Both EYES four times a day  BACItracin   Ointment 1 Application(s) Topical two times a day  chlorhexidine 2% Cloths 1 Application(s) Topical daily  clonazePAM  Tablet 1 milliGRAM(s) Oral at bedtime  Dakins Solution - 1/4 Strength 1 Application(s) Topical two times a day  dextrose 5%. 1000 milliLiter(s) (50 mL/Hr) IV Continuous <Continuous>  famotidine    Tablet 20 milliGRAM(s) Oral every 48 hours  ferrous    sulfate Liquid 300 milliGRAM(s) Enteral Tube daily  formoterol for nebulization 20 MICROGram(s) Nebulizer two times a day  gabapentin   Solution 300 milliGRAM(s) Oral daily  hydrocortisone 1% Cream 1 Application(s) Topical once  insulin lispro (HumaLOG) corrective regimen sliding scale   SubCutaneous every 6 hours  levothyroxine Injectable 60 MICROGram(s) IV Push at bedtime  Medical Marijuana Awake Oil 2 milliLiter(s),Medical Marijuana Awake Oil 2 milliLiter(s) 2 milliLiter(s) Enteral Tube <User Schedule>  Medical Marijuana Calm Oil 2 milliLiter(s),Medical Marinjuana Calm Oil 2 milliLiter(s) 2 milliLiter(s) Enteral Tube <User Schedule>  Medical Marijuana [place] Oil 2 milliLiter(s),Medical Marijuana [place] Oil 2 milliLiter(s) 2 milliLiter(s) Enteral Tube <User Schedule>  metoprolol tartrate 12.5 milliGRAM(s) Oral two times a day  multivitamin/minerals/iron Oral Solution (CENTRUM) 15 milliLiter(s) Oral daily  QUEtiapine 25 milliGRAM(s) Oral at bedtime  sevelamer carbonate Powder 800 milliGRAM(s) Oral three times a day with meals  sodium chloride 0.9%. 1000 milliLiter(s) (50 mL/Hr) IV Continuous <Continuous>  tiZANidine 4 milliGRAM(s) Oral <User Schedule>  zinc sulfate 220 milliGRAM(s) Oral daily    MEDICATIONS  (PRN):  acetaminophen  Suppository .. 650 milliGRAM(s) Rectal every 6 hours PRN Temp greater or equal to 38C (100.4F), Mild Pain (1 - 3)  carboxymethylcellulose 0.5% (Preservative-Free) Ophthalmic Solution 1 Drop(s) Both EYES three times a day PRN dry eyes  glucagon  Injectable 1 milliGRAM(s) IntraMuscular once PRN Glucose LESS THAN 70 milligrams/deciliter  HYDROmorphone  Injectable 1 milliGRAM(s) IV Push every 8 hours PRN Moderate Pain (4 - 6)  nystatin Powder 1 Application(s) Topical three times a day PRN under breasts    PRESENT SYMPTOMS: [x ]Unable to obtain due to poor mentation   Source if other than patient:  [ ]Family   [ ]Team     Pain: [x ] yes [ ] no  QOL impact -   Location -                    Aggravating factors -  Quality -  Radiation -  Timing-  Severity (0-10 scale):  Minimal acceptable level (0-10 scale):     PAIN AD Score: 4-5. It is difficult to define where her pain is coming from. However, it is likely multifactorial 2/2 to contractures and DU. Her  indicated facial grimacing with dressing changes.     [email or web address] (press ctrl +  left click to view)    Dyspnea:                           [ ]Mild [ ]Moderate [ ]Severe  Anxiety:                             [ ]Mild [ ]Moderate [ ]Severe  Fatigue:                             [ ]Mild [ ]Moderate [ ]Severe  Nausea:                             [ ]Mild [ ]Moderate [ ]Severe  Loss of appetite:              [ ]Mild [ ]Moderate [ ]Severe  Constipation:                    [ ]Mild [ ]Moderate [ ]Severe    Other Symptoms:  [ ]All other review of systems negative     Karnofsky Performance Score/Palliative Performance Status Version 2:   10    %    [email or web address]/[email or web address]_performance_scale_[email or web address].pdf  PHYSICAL EXAM:  Vital Signs Last 24 Hrs  T(C): 36.5 (05 Aug 2019 09:00), Max: 37.1 (04 Aug 2019 18:15)  T(F): 97.7 (05 Aug 2019 09:00), Max: 98.7 (04 Aug 2019 18:15)  HR: 100 (05 Aug 2019 09:00) (99 - 109)  BP: 117/79 (05 Aug 2019 09:00) (117/79 - 150/82)  BP(mean): --  RR: 18 (05 Aug 2019 09:00) (18 - 18)  SpO2: 100% (05 Aug 2019 09:00) (98% - 100%) I&O's Summary    04 Aug 2019 07:01  -  05 Aug 2019 07:00  --------------------------------------------------------  IN: 275 mL / OUT: 450 mL / NET: -175 mL    GENERAL:  [x ]Alert  [ ]Oriented x   [ ]Lethargic  [x ]Cachexia  [ ]Unarousable  [ ]Verbal  [x ]Non-Verbal  Behavioral:   [ ] Anxiety  [ ] Delirium [ ] Agitation [ ] Other  HEENT:  [ ]Normal   [x ]Dry mouth   [ ]ET Tube/Trach  [ ]Oral lesions  PULMONARY:   [ ]Clear [ ]Tachypnea  [x ]Audible excessive secretions   [ ]Rhonchi        [ ]Right [ ]Left [ ]Bilateral  [x ]Crackles        [ ]Right [ ]Left [x ]Bilateral  [ ]Wheezing     [ ]Right [ ]Left [ ]Bilateral  CARDIOVASCULAR:    [ x]Regular [ ]Irregular [ ]Tachy  [ ]Sourav [ ]Murmur [ ]Other  GASTROINTESTINAL:  [ x]Soft  [ ]Distended   [ x]+BS  [ ]Non tender [ ]Tender  [x ]PEG [ ]OGT/ NGT  Last BM: 8/4  GENITOURINARY:  [ ]Normal [ ] Incontinent   [ ]Oliguria/Anuria   [x ]Blackman  MUSCULOSKELETAL:   [ ]Normal   [ ]Weakness  [x ]Bed/Wheelchair bound [ ]Edema  NEUROLOGIC:   [ ]No focal deficits  [x ] Advanced Cognitive impairment  [x ] Dysphagia [ ]Dysarthria [ ] Paresis [ ]Other   SKIN:   [ ]Normal   [x ]Pressure ulcer(s)  [ ]Rash    CRITICAL CARE:  [ ] Shock Present  [ ]Septic [ ]Cardiogenic [ ]Neurologic [ ]Hypovolemic  [ ]  Vasopressors [ ]  Inotropes   [ ] Respiratory failure present [ ] mechanical ventilation [ ] non-invasive ventilatory support [ ] High flow  [ ] Acute  [ ] Chronic [ ] Hypoxic  [ ] Hypercarbic [ ] Other  [ ] Other organ failure     LABS:                        7.5    13.37 )-----------( 212      ( 05 Aug 2019 07:54 )             23.5   08-05    132<L>  |  87<L>  |  160<H>  ----------------------------<  105<H>  4.4   |  20<L>  |  3.88<H>    Ca    7.9<L>      05 Aug 2019 06:30    TPro  5.5<L>  /  Alb  2.5<L>  /  TBili  0.2  /  DBili  x   /  AST  11  /  ALT  6<L>  /  AlkPhos  84  08-03        RADIOLOGY & ADDITIONAL STUDIES:  < from: CT Abdomen and Pelvis w/ Oral Cont (07.01.19 @ 19:34) >  EXAM:  CT CHEST                          EXAM:  CT ABDOMEN AND PELVIS OC                            PROCEDURE DATE:  07/01/2019  IMPRESSION:     Impacted left sided airways with collapse of the lingula and left lower   lobe. Underlying pneumonia is not excluded.    Large sacral decubitus ulceration with concern for sacral osteomyelitis.                    OUMOU HENDRICKS M.D., RADIOLOGIST RESIDENT  This document has been electronically signed.  PEGGY DANIELS M.D., ATTENDING RADIOLOGIST  This document has been electronically signed. Jul 2 2019 12:04PM    < end of copied text >    PROTEIN CALORIE MALNUTRITION PRESENT: [ ] Yes [ ] No  [x ] PPSV2 < or = to 30% [ ] significant weight loss  [ ] poor nutritional intake [ ] catabolic state [ ] anasarca     Albumin, Serum: 2.5 g/dL (08-03-19 @ 13:47)  Artificial Nutrition [ ]     REFERRALS:   [x ]Chaplaincy  [x ] Hospice  [ ]Child Life  [ ]Social Work  [ ]Case management [ ]Holistic Therapy     Goals of Care Document:   Care Coordination Assessment [C. Provider] (06-25-19 @ 09:39)   Progress Notes - Care Coordination [C. Provider] (08-01-19 @ 12:37)

## 2019-08-05 NOTE — PROGRESS NOTE ADULT - SUBJECTIVE AND OBJECTIVE BOX
INTERVAL HPI/OVERNIGHT EVENTS:  Patient positioned in bed for comfort and safety, staff and spouse bedside, tolerating G tube feeds  . BM yesterday     MEDICATIONS  (STANDING):  ALBUTerol/ipratropium for Nebulization 3 milliLiter(s) Nebulizer every 6 hours  apixaban 2.5 milliGRAM(s) Oral two times a day  artificial tears (preservative free) Ophthalmic Solution 1 Drop(s) Both EYES four times a day  BACItracin   Ointment 1 Application(s) Topical two times a day  chlorhexidine 2% Cloths 1 Application(s) Topical daily  clonazePAM  Tablet 1 milliGRAM(s) Oral at bedtime  Dakins Solution - 1/4 Strength 1 Application(s) Topical two times a day  dextrose 5%. 1000 milliLiter(s) (50 mL/Hr) IV Continuous <Continuous>  famotidine    Tablet 20 milliGRAM(s) Oral every 48 hours  ferrous    sulfate Liquid 300 milliGRAM(s) Enteral Tube daily  formoterol for nebulization 20 MICROGram(s) Nebulizer two times a day  gabapentin   Solution 300 milliGRAM(s) Oral daily  hydrocortisone 1% Cream 1 Application(s) Topical once  HYDROmorphone  Injectable 0.2 milliGRAM(s) IV Push every 8 hours  insulin lispro (HumaLOG) corrective regimen sliding scale   SubCutaneous every 6 hours  levothyroxine Injectable 60 MICROGram(s) IV Push at bedtime  Medical Marijuana Awake Oil 2 milliLiter(s),Medical Marijuana Awake Oil 2 milliLiter(s) 2 milliLiter(s) Enteral Tube <User Schedule>  Medical Marijuana Calm Oil 2 milliLiter(s),Medical Marinjuana Calm Oil 2 milliLiter(s) 2 milliLiter(s) Enteral Tube <User Schedule>  Medical Marijuana [place] Oil 2 milliLiter(s),Medical Marijuana [place] Oil 2 milliLiter(s) 2 milliLiter(s) Enteral Tube <User Schedule>  metoprolol tartrate 12.5 milliGRAM(s) Oral two times a day  multivitamin/minerals/iron Oral Solution (CENTRUM) 15 milliLiter(s) Oral daily  naloxone Injectable 0.1 milliGRAM(s) IV Push every 3 minutes  QUEtiapine 25 milliGRAM(s) Oral at bedtime  sevelamer carbonate Powder 800 milliGRAM(s) Oral three times a day with meals  sodium chloride 0.9%. 1000 milliLiter(s) (50 mL/Hr) IV Continuous <Continuous>  tiZANidine 4 milliGRAM(s) Oral <User Schedule>  zinc sulfate 220 milliGRAM(s) Oral daily    MEDICATIONS  (PRN):  acetaminophen  Suppository .. 650 milliGRAM(s) Rectal every 6 hours PRN Temp greater or equal to 38C (100.4F), Mild Pain (1 - 3)  carboxymethylcellulose 0.5% (Preservative-Free) Ophthalmic Solution 1 Drop(s) Both EYES three times a day PRN dry eyes  glucagon  Injectable 1 milliGRAM(s) IntraMuscular once PRN Glucose LESS THAN 70 milligrams/deciliter  HYDROmorphone  Injectable 0.3 milliGRAM(s) IV Push every 4 hours PRN Severe Pain (7 - 10)  nystatin Powder 1 Application(s) Topical three times a day PRN under breasts      Allergies    ASA; dye contrast (Anaphylaxis)  aspirin (Short breath)  divalproex sodium (Other (Unknown))  Flowers and Plants (Short breath)  Haldol (Other (Unknown))  penicillin (Short breath; Rash)  sulfa drugs (Short breath; Rash)  vancomycin (Rash; Urticaria; Hives)  Xanax (Other (Unknown))    Intolerances        Review of Systems:  Unable to obtain from patient   General:  No fevers or chills reported   ENT:  dysphagia  Resp:  on nasal canula , no  wheezing  GI: scant drainage at G tube site/ drain sponge changed , BM yesterday per RN         Vital Signs Last 24 Hrs  T(C): 36.5 (05 Aug 2019 13:00), Max: 37.1 (04 Aug 2019 18:15)  T(F): 97.7 (05 Aug 2019 13:00), Max: 98.7 (04 Aug 2019 18:15)  HR: 100 (05 Aug 2019 13:00) (100 - 109)  BP: 133/65 (05 Aug 2019 13:00) (117/79 - 150/82)  BP(mean): --  RR: 18 (05 Aug 2019 13:00) (18 - 18)  SpO2: 100% (05 Aug 2019 13:00) (98% - 100%)    PHYSICAL EXAM:    Constitutional: In NAD non toxic, contractures   Neck: contractures   Respiratory: anterior chest wall no audible wheeze   Cardiovascular: S1 and S2, RRR  Gastrointestinal: soft non distended , + hypoactive bowel sounds   Extremities: contractures   Skin: No jaundice       LABS:                        7.5    13.37 )-----------( 212      ( 05 Aug 2019 07:54 )             23.5     08-05    132<L>  |  87<L>  |  160<H>  ----------------------------<  105<H>  4.4   |  20<L>  |  3.88<H>    Ca    7.9<L>      05 Aug 2019 06:30          LIVER FUNCTIONS - ( 03 Aug 2019 13:47 )  Alb: 2.5 g/dL / Pro: 5.5 g/dL / ALK PHOS: 84 U/L / ALT: 6 U/L / AST: 11 U/L / GGT: x             RADIOLOGY & ADDITIONAL TESTS:

## 2019-08-05 NOTE — PROGRESS NOTE ADULT - SUBJECTIVE AND OBJECTIVE BOX
NEPHROLOGY-[facility] [phone]        Patient seen and examined in bed.  She was about the same         MEDICATIONS  (STANDING):  ALBUTerol/ipratropium for Nebulization 3 milliLiter(s) Nebulizer every 6 hours  apixaban 2.5 milliGRAM(s) Oral two times a day  artificial tears (preservative free) Ophthalmic Solution 1 Drop(s) Both EYES four times a day  BACItracin   Ointment 1 Application(s) Topical two times a day  chlorhexidine 2% Cloths 1 Application(s) Topical daily  clonazePAM  Tablet 1 milliGRAM(s) Oral at bedtime  Dakins Solution - 1/4 Strength 1 Application(s) Topical two times a day  dextrose 5%. 1000 milliLiter(s) (50 mL/Hr) IV Continuous <Continuous>  famotidine    Tablet 20 milliGRAM(s) Oral every 48 hours  ferrous    sulfate Liquid 300 milliGRAM(s) Enteral Tube daily  formoterol for nebulization 20 MICROGram(s) Nebulizer two times a day  gabapentin   Solution 300 milliGRAM(s) Oral daily  hydrocortisone 1% Cream 1 Application(s) Topical once  insulin lispro (HumaLOG) corrective regimen sliding scale   SubCutaneous every 6 hours  levothyroxine Injectable 60 MICROGram(s) IV Push at bedtime  Medical Marijuana Awake Oil 2 milliLiter(s),Medical Marijuana Awake Oil 2 milliLiter(s) 2 milliLiter(s) Enteral Tube <User Schedule>  Medical Marijuana Calm Oil 2 milliLiter(s),Medical Marinjuana Calm Oil 2 milliLiter(s) 2 milliLiter(s) Enteral Tube <User Schedule>  Medical Marijuana [place] Oil 2 milliLiter(s),Medical Marijuana [place] Oil 2 milliLiter(s) 2 milliLiter(s) Enteral Tube <User Schedule>  metoprolol tartrate 12.5 milliGRAM(s) Oral two times a day  multivitamin/minerals/iron Oral Solution (CENTRUM) 15 milliLiter(s) Oral daily  QUEtiapine 25 milliGRAM(s) Oral at bedtime  sevelamer carbonate Powder 800 milliGRAM(s) Oral three times a day with meals  sodium chloride 0.9%. 1000 milliLiter(s) (50 mL/Hr) IV Continuous <Continuous>  tiZANidine 4 milliGRAM(s) Oral <User Schedule>  zinc sulfate 220 milliGRAM(s) Oral daily      VITAL:  T(C): , Max: 37.1 (08-04-19 @ 18:15)  T(F): , Max: 98.7 (08-04-19 @ 18:15)  HR: 100 (08-05-19 @ 09:00)  BP: 117/79 (08-05-19 @ 09:00)  BP(mean): --  RR: 18 (08-05-19 @ 09:00)  SpO2: 100% (08-05-19 @ 09:00)  Wt(kg): --    I and O's:    08-04 @ 07:01  -  08-05 @ 07:00  --------------------------------------------------------  IN: 275 mL / OUT: 450 mL / NET: -175 mL          PHYSICAL EXAM:    Constitutional: Does not respond to verbal   Neck:  No JVD  Respiratory: Course   Cardiovascular: S1 and S2  Gastrointestinal: BS+, soft, NT/ND  Extremities: +  peripheral edema  Neurological: unable   : + Blackman  Skin: No rashes  Access: Not applicable    LABS:                        7.5    13.37 )-----------( 212      ( 05 Aug 2019 07:54 )             23.5     08-05    132<L>  |  87<L>  |  160<H>  ----------------------------<  105<H>  4.4   |  20<L>  |  3.88<H>    Ca    7.9<L>      05 Aug 2019 06:30    TPro  5.5<L>  /  Alb  2.5<L>  /  TBili  0.2  /  DBili  x   /  AST  11  /  ALT  6<L>  /  AlkPhos  84  08-03          Urine Studies:          RADIOLOGY & ADDITIONAL STUDIES:

## 2019-08-05 NOTE — PROGRESS NOTE ADULT - ASSESSMENT
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, left knee fracture, DM, RLE DVT, p/w dislodged PEG tube s/p replacement of PEG by IR c/b RTANG   - TRANG: multifactorial with solitary kidney - likely ATN - non-oliguric - BUN elevated in part due to steroids   - metabolic acidosis with high anion gap: from TRANG + sepsis - stable  - hyponatremia: due to hypervolemia and TRANG -  Na+ improved though still low  - hypervolemic on exam  - anemia: of chronic dz - hgb trending down   - hyperphosphatemia: due to CKD/TRANG - on binders    RECOMMEND:  - palliative care meeting in am  - monitor strict IOs  - continue TF with nepro as able via PEG per GI  - continue Renvela powder 800 mg TID via PEG  - maintain chronic gavin catheter   - dose medication for a GFR ~ 10-15 mL/min      [address]  [phone]

## 2019-08-06 DIAGNOSIS — R21 RASH AND OTHER NONSPECIFIC SKIN ERUPTION: ICD-10-CM

## 2019-08-06 LAB
ANION GAP SERPL CALC-SCNC: 21 MMOL/L — HIGH (ref 5–17)
BUN SERPL-MCNC: 160 MG/DL — HIGH (ref 7–23)
CALCIUM SERPL-MCNC: 8.1 MG/DL — LOW (ref 8.4–10.5)
CHLORIDE SERPL-SCNC: 91 MMOL/L — LOW (ref 96–108)
CO2 SERPL-SCNC: 21 MMOL/L — LOW (ref 22–31)
CREAT SERPL-MCNC: 4.13 MG/DL — HIGH (ref 0.5–1.3)
GLUCOSE BLDC GLUCOMTR-MCNC: 117 MG/DL — HIGH (ref 70–99)
GLUCOSE BLDC GLUCOMTR-MCNC: 144 MG/DL — HIGH (ref 70–99)
GLUCOSE BLDC GLUCOMTR-MCNC: 94 MG/DL — SIGNIFICANT CHANGE UP (ref 70–99)
GLUCOSE SERPL-MCNC: 123 MG/DL — HIGH (ref 70–99)
HCT VFR BLD CALC: 23.3 % — LOW (ref 34.5–45)
HGB BLD-MCNC: 7.4 G/DL — LOW (ref 11.5–15.5)
MCHC RBC-ENTMCNC: 28.6 PG — SIGNIFICANT CHANGE UP (ref 27–34)
MCHC RBC-ENTMCNC: 31.8 GM/DL — LOW (ref 32–36)
MCV RBC AUTO: 90 FL — SIGNIFICANT CHANGE UP (ref 80–100)
PLATELET # BLD AUTO: 239 K/UL — SIGNIFICANT CHANGE UP (ref 150–400)
POTASSIUM SERPL-MCNC: 4.5 MMOL/L — SIGNIFICANT CHANGE UP (ref 3.5–5.3)
POTASSIUM SERPL-SCNC: 4.5 MMOL/L — SIGNIFICANT CHANGE UP (ref 3.5–5.3)
RBC # BLD: 2.59 M/UL — LOW (ref 3.8–5.2)
RBC # FLD: 16.5 % — HIGH (ref 10.3–14.5)
SODIUM SERPL-SCNC: 133 MMOL/L — LOW (ref 135–145)
WBC # BLD: 12.4 K/UL — HIGH (ref 3.8–10.5)
WBC # FLD AUTO: 12.4 K/UL — HIGH (ref 3.8–10.5)

## 2019-08-06 PROCEDURE — 99498 ADVNCD CARE PLAN ADDL 30 MIN: CPT | Mod: 25

## 2019-08-06 PROCEDURE — 99497 ADVNCD CARE PLAN 30 MIN: CPT | Mod: 25

## 2019-08-06 PROCEDURE — 99233 SBSQ HOSP IP/OBS HIGH 50: CPT | Mod: GC

## 2019-08-06 RX ORDER — HYDROMORPHONE HYDROCHLORIDE 2 MG/ML
0.2 INJECTION INTRAMUSCULAR; INTRAVENOUS; SUBCUTANEOUS
Refills: 0 | Status: DISCONTINUED | OUTPATIENT
Start: 2019-08-06 | End: 2019-08-07

## 2019-08-06 RX ORDER — DIPHENHYDRAMINE HCL 50 MG
12.5 CAPSULE ORAL ONCE
Refills: 0 | Status: COMPLETED | OUTPATIENT
Start: 2019-08-06 | End: 2019-08-06

## 2019-08-06 RX ORDER — HYDROCORTISONE 1 %
1 OINTMENT (GRAM) TOPICAL
Refills: 0 | Status: DISCONTINUED | OUTPATIENT
Start: 2019-08-06 | End: 2019-08-09

## 2019-08-06 RX ADMIN — Medication 3 MILLILITER(S): at 06:00

## 2019-08-06 RX ADMIN — FAMOTIDINE 20 MILLIGRAM(S): 10 INJECTION INTRAVENOUS at 11:22

## 2019-08-06 RX ADMIN — TIZANIDINE 4 MILLIGRAM(S): 4 TABLET ORAL at 11:19

## 2019-08-06 RX ADMIN — Medication 1 MILLIGRAM(S): at 22:44

## 2019-08-06 RX ADMIN — HYDROMORPHONE HYDROCHLORIDE 0.3 MILLIGRAM(S): 2 INJECTION INTRAMUSCULAR; INTRAVENOUS; SUBCUTANEOUS at 10:32

## 2019-08-06 RX ADMIN — Medication 3 MILLILITER(S): at 17:11

## 2019-08-06 RX ADMIN — HYDROMORPHONE HYDROCHLORIDE 0.2 MILLIGRAM(S): 2 INJECTION INTRAMUSCULAR; INTRAVENOUS; SUBCUTANEOUS at 06:02

## 2019-08-06 RX ADMIN — Medication 15 MILLILITER(S): at 11:20

## 2019-08-06 RX ADMIN — SEVELAMER CARBONATE 800 MILLIGRAM(S): 2400 POWDER, FOR SUSPENSION ORAL at 09:02

## 2019-08-06 RX ADMIN — TIZANIDINE 4 MILLIGRAM(S): 4 TABLET ORAL at 21:24

## 2019-08-06 RX ADMIN — Medication 1 APPLICATION(S): at 06:04

## 2019-08-06 RX ADMIN — Medication 12.5 MILLIGRAM(S): at 13:53

## 2019-08-06 RX ADMIN — HYDROMORPHONE HYDROCHLORIDE 0.3 MILLIGRAM(S): 2 INJECTION INTRAMUSCULAR; INTRAVENOUS; SUBCUTANEOUS at 21:12

## 2019-08-06 RX ADMIN — Medication 1 DROP(S): at 11:20

## 2019-08-06 RX ADMIN — Medication 1 APPLICATION(S): at 17:14

## 2019-08-06 RX ADMIN — HYDROMORPHONE HYDROCHLORIDE 0.3 MILLIGRAM(S): 2 INJECTION INTRAMUSCULAR; INTRAVENOUS; SUBCUTANEOUS at 10:18

## 2019-08-06 RX ADMIN — Medication 1 APPLICATION(S): at 17:12

## 2019-08-06 RX ADMIN — Medication 3 MILLILITER(S): at 11:20

## 2019-08-06 RX ADMIN — GABAPENTIN 300 MILLIGRAM(S): 400 CAPSULE ORAL at 09:05

## 2019-08-06 RX ADMIN — APIXABAN 2.5 MILLIGRAM(S): 2.5 TABLET, FILM COATED ORAL at 17:13

## 2019-08-06 RX ADMIN — APIXABAN 2.5 MILLIGRAM(S): 2.5 TABLET, FILM COATED ORAL at 06:01

## 2019-08-06 RX ADMIN — Medication 1 APPLICATION(S): at 17:13

## 2019-08-06 RX ADMIN — HYDROMORPHONE HYDROCHLORIDE 0.2 MILLIGRAM(S): 2 INJECTION INTRAMUSCULAR; INTRAVENOUS; SUBCUTANEOUS at 13:54

## 2019-08-06 RX ADMIN — Medication 300 MILLIGRAM(S): at 11:20

## 2019-08-06 RX ADMIN — Medication 12.5 MILLIGRAM(S): at 17:13

## 2019-08-06 RX ADMIN — Medication 20 MICROGRAM(S): at 06:01

## 2019-08-06 RX ADMIN — CHLORHEXIDINE GLUCONATE 1 APPLICATION(S): 213 SOLUTION TOPICAL at 10:18

## 2019-08-06 RX ADMIN — HYDROMORPHONE HYDROCHLORIDE 0.2 MILLIGRAM(S): 2 INJECTION INTRAMUSCULAR; INTRAVENOUS; SUBCUTANEOUS at 18:25

## 2019-08-06 RX ADMIN — Medication 20 MICROGRAM(S): at 17:11

## 2019-08-06 RX ADMIN — QUETIAPINE FUMARATE 25 MILLIGRAM(S): 200 TABLET, FILM COATED ORAL at 22:44

## 2019-08-06 RX ADMIN — ZINC SULFATE TAB 220 MG (50 MG ZINC EQUIVALENT) 220 MILLIGRAM(S): 220 (50 ZN) TAB at 11:21

## 2019-08-06 RX ADMIN — Medication 1 APPLICATION(S): at 06:01

## 2019-08-06 RX ADMIN — Medication 1 DROP(S): at 06:00

## 2019-08-06 RX ADMIN — HYDROMORPHONE HYDROCHLORIDE 0.2 MILLIGRAM(S): 2 INJECTION INTRAMUSCULAR; INTRAVENOUS; SUBCUTANEOUS at 14:10

## 2019-08-06 RX ADMIN — Medication 1 DROP(S): at 22:45

## 2019-08-06 RX ADMIN — HYDROMORPHONE HYDROCHLORIDE 0.2 MILLIGRAM(S): 2 INJECTION INTRAMUSCULAR; INTRAVENOUS; SUBCUTANEOUS at 18:40

## 2019-08-06 RX ADMIN — Medication 60 MICROGRAM(S): at 22:43

## 2019-08-06 NOTE — PROGRESS NOTE ADULT - PROBLEM SELECTOR PLAN 7
Family meeting took place at 10 am today along with hospice team.  and 2 daughter have agreed to place pt on home hospice. GOC discussion held at length, family in agreement and have decided that patient should be DNR/DNI. MOLST form completed.  50' were spent in ACP and completion of MOLST form.  On DC please also prescribe Ativan concentrated 0.2 mg q 4 PRN for agitation, anxiety, or respiratory distress after back to back opioids are given.

## 2019-08-06 NOTE — PROGRESS NOTE ADULT - SUBJECTIVE AND OBJECTIVE BOX
SUBJECTIVE AND OBJECTIVE: Pt is nonverbal, appears more comfortable that yesterday.     INTERVAL HPI/OVERNIGHT EVENTS: Pt developed generalized maculopapular rash, appears consistent with a drug rash.      DNR on chart: Yes    Allergies    ASA; dye contrast (Anaphylaxis)  aspirin (Short breath)  divalproex sodium (Other (Unknown))  Flowers and Plants (Short breath)  Haldol (Other (Unknown))  penicillin (Short breath; Rash)  sulfa drugs (Short breath; Rash)  vancomycin (Rash; Urticaria; Hives)  Xanax (Other (Unknown))    Intolerances    MEDICATIONS  (STANDING):  ALBUTerol/ipratropium for Nebulization 3 milliLiter(s) Nebulizer every 6 hours  apixaban 2.5 milliGRAM(s) Oral two times a day  artificial tears (preservative free) Ophthalmic Solution 1 Drop(s) Both EYES four times a day  BACItracin   Ointment 1 Application(s) Topical two times a day  chlorhexidine 2% Cloths 1 Application(s) Topical daily  clonazePAM  Tablet 1 milliGRAM(s) Oral at bedtime  Dakins Solution - 1/4 Strength 1 Application(s) Topical two times a day  dextrose 5%. 1000 milliLiter(s) (50 mL/Hr) IV Continuous <Continuous>  diphenhydrAMINE   Injectable 12.5 milliGRAM(s) IV Push once  famotidine    Tablet 20 milliGRAM(s) Oral every 48 hours  ferrous    sulfate Liquid 300 milliGRAM(s) Enteral Tube daily  formoterol for nebulization 20 MICROGram(s) Nebulizer two times a day  gabapentin   Solution 300 milliGRAM(s) Oral daily  hydrocortisone 1% Cream 1 Application(s) Topical once  hydrocortisone 1% Cream 1 Application(s) Topical two times a day  HYDROmorphone  Injectable 0.2 milliGRAM(s) IV Push <User Schedule>  insulin lispro (HumaLOG) corrective regimen sliding scale   SubCutaneous every 6 hours  levothyroxine Injectable 60 MICROGram(s) IV Push at bedtime  Medical Marijuana Awake Oil 2 milliLiter(s),Medical Marijuana Awake Oil 2 milliLiter(s) 2 milliLiter(s) Enteral Tube <User Schedule>  Medical Marijuana Calm Oil 2 milliLiter(s),Medical Marinjuana Calm Oil 2 milliLiter(s) 2 milliLiter(s) Enteral Tube <User Schedule>  Medical Marijuana [place] Oil 2 milliLiter(s),Medical Marijuana [place] Oil 2 milliLiter(s) 2 milliLiter(s) Enteral Tube <User Schedule>  metoprolol tartrate 12.5 milliGRAM(s) Oral two times a day  multivitamin/minerals/iron Oral Solution (CENTRUM) 15 milliLiter(s) Oral daily  naloxone Injectable 0.1 milliGRAM(s) IV Push every 3 minutes  QUEtiapine 25 milliGRAM(s) Oral at bedtime  sevelamer carbonate Powder 800 milliGRAM(s) Oral three times a day with meals  sodium chloride 0.9%. 1000 milliLiter(s) (50 mL/Hr) IV Continuous <Continuous>  tiZANidine 4 milliGRAM(s) Oral <User Schedule>  zinc sulfate 220 milliGRAM(s) Oral daily    MEDICATIONS  (PRN):  acetaminophen  Suppository .. 650 milliGRAM(s) Rectal every 6 hours PRN Temp greater or equal to 38C (100.4F), Mild Pain (1 - 3)  carboxymethylcellulose 0.5% (Preservative-Free) Ophthalmic Solution 1 Drop(s) Both EYES three times a day PRN dry eyes  glucagon  Injectable 1 milliGRAM(s) IntraMuscular once PRN Glucose LESS THAN 70 milligrams/deciliter  HYDROmorphone  Injectable 0.3 milliGRAM(s) IV Push every 4 hours PRN Severe Pain (7 - 10)  nystatin Powder 1 Application(s) Topical three times a day PRN under breasts      ITEMS UNCHECKED ARE NOT PRESENT    PRESENT SYMPTOMS: [x ]Unable to obtain due to poor mentation   Source if other than patient:  [x ]Family   [x ]Team     Pain:  [x ] yes [ ] no    Dyspnea:                           [ ]Mild [ ]Moderate [ ]Severe  Anxiety:                             [ ]Mild [ ]Moderate [ ]Severe  Fatigue:                             [ ]Mild [ ]Moderate [ ]Severe  Nausea:                             [ ]Mild [ ]Moderate [ ]Severe  Loss of appetite:              [ ]Mild [ ]Moderate [ ]Severe  Constipation:                    [ ]Mild [ ]Moderate [ ]Severe    PAIN AD Score:	1    Other Symptoms:  [ ]All other review of systems negative     Karnofsky Performance Score/Palliative Performance Status Version 2:      10   %        PHYSICAL EXAM:  Vital Signs Last 24 Hrs  T(C): 36.3 (06 Aug 2019 09:00), Max: 36.9 (05 Aug 2019 21:45)  T(F): 97.4 (06 Aug 2019 09:00), Max: 98.5 (06 Aug 2019 05:00)  HR: 103 (06 Aug 2019 09:00) (100 - 105)  BP: 113/69 (06 Aug 2019 09:00) (109/69 - 133/65)  BP(mean): --  RR: 18 (06 Aug 2019 09:00) (18 - 18)  SpO2: 95% (06 Aug 2019 09:00) (95% - 100%) I&O's Summary    05 Aug 2019 07:01  -  06 Aug 2019 07:00  --------------------------------------------------------  IN: 825 mL / OUT: 300 mL / NET: 525 mL    06 Aug 2019 07:01  -  06 Aug 2019 11:41  --------------------------------------------------------  IN: 75 mL / OUT: 50 mL / NET: 25 mL     GENERAL:  [ ]Alert  [ ]Oriented x   [ ]Lethargic  [x ]Cachexia  [ ]Unarousable  [ ]Verbal  [x ]Non-Verbal  Behavioral:   [ ] Anxiety  [ ] Delirium [ ] Agitation [ ] Other  HEENT:  [ ]Normal   [x ]Dry mouth   [ ]ET Tube/Trach  [ ]Oral lesions  PULMONARY:   [ ]Clear [ ]Tachypnea  [ x]Audible upper airway secretions   [ ]Rhonchi        [ ]Right [ ]Left [ ]Bilateral  [x ]Crackles        [ ]Right [ ]Left [x ]Bilateral  [ ]Wheezing     [ ]Right [ ]Left [ ]Bilateral  CARDIOVASCULAR:    [x ]Regular [ ]Irregular [ ]Tachy  [ ]Sourav [ ]Murmur [ ]Other  GASTROINTESTINAL:  [ ]Soft  [ ]Distended   [x ]+BS  [ x]Non tender [ ]Tender  [x ]PEG [ ]OGT/ NGT     GENITOURINARY:  [ ]Normal [ ] Incontinent   [ ]Oliguria/Anuria   [ x]Blackman  MUSCULOSKELETAL:   [ ]Normal   [ ]Weakness  [x ]Bed/Wheelchair bound [ ]Edema  NEUROLOGIC:   [ ]No focal deficits  [ x] Cognitive impairment  [ x] Dysphagia [ ]Dysarthria [ ] Paresis [ ]Other   SKIN:   [ ]Normal   [ x]Pressure ulcer(s)  [x ]Rash    CRITICAL CARE:  [ ] Shock Present  [ ]Septic [ ]Cardiogenic [ ]Neurologic [ ]Hypovolemic  [ ]  Vasopressors [ ]  Inotropes  [ ] Respiratory failure present [ ] Mechanical Ventilation [ ] Non-invasive ventilatory support [ ] High-Flow  [ ] Acute  [ ] Chronic [ ] Hypoxic  [ ] Hypercarbic [ ] Other  [ ] Other organ failure     LABS:                        7.5    13.37 )-----------( 212      ( 05 Aug 2019 07:54 )             23.5   08-06    133<L>  |  91<L>  |  160<H>  ----------------------------<  123<H>  4.5   |  21<L>  |  4.13<H>    Ca    8.1<L>      06 Aug 2019 09:33          RADIOLOGY & ADDITIONAL STUDIES:    Protein Calorie Malnutrition Present: [x ] yes [ ] no  [x ] PPSV2 < or = 30%  [ ] significant weight loss [ ] poor nutritional intake [ ] anasarca [ ] catabolic state Albumin, Serum: 2.5 g/dL (08-03-19 @ 13:47)  Artificial Nutrition [ ]     REFERRALS:   [x ]Chaplaincy  [ x] Hospice  [ ]Child Life  [ ]Social Work  [ ]Case management [ ]Holistic Therapy     Goals of Care Document:    Care Coordination Assessment [C. Provider] (06-25-19 @ 09:39) SUBJECTIVE AND OBJECTIVE: Pt is nonverbal, appears more comfortable that yesterday.     INTERVAL HPI/OVERNIGHT EVENTS: Pt developed generalized maculopapular rash, appears consistent with a drug rash.      DNR on chart: Yes    Allergies    ASA; dye contrast (Anaphylaxis)  aspirin (Short breath)  divalproex sodium (Other (Unknown))  Flowers and Plants (Short breath)  Haldol (Other (Unknown))  penicillin (Short breath; Rash)  sulfa drugs (Short breath; Rash)  vancomycin (Rash; Urticaria; Hives)  Xanax (Other (Unknown))    Intolerances    MEDICATIONS  (STANDING):  ALBUTerol/ipratropium for Nebulization 3 milliLiter(s) Nebulizer every 6 hours  apixaban 2.5 milliGRAM(s) Oral two times a day  artificial tears (preservative free) Ophthalmic Solution 1 Drop(s) Both EYES four times a day  BACItracin   Ointment 1 Application(s) Topical two times a day  chlorhexidine 2% Cloths 1 Application(s) Topical daily  clonazePAM  Tablet 1 milliGRAM(s) Oral at bedtime  Dakins Solution - 1/4 Strength 1 Application(s) Topical two times a day  dextrose 5%. 1000 milliLiter(s) (50 mL/Hr) IV Continuous <Continuous>  diphenhydrAMINE   Injectable 12.5 milliGRAM(s) IV Push once  famotidine    Tablet 20 milliGRAM(s) Oral every 48 hours  ferrous    sulfate Liquid 300 milliGRAM(s) Enteral Tube daily  formoterol for nebulization 20 MICROGram(s) Nebulizer two times a day  gabapentin   Solution 300 milliGRAM(s) Oral daily  hydrocortisone 1% Cream 1 Application(s) Topical once  hydrocortisone 1% Cream 1 Application(s) Topical two times a day  HYDROmorphone  Injectable 0.2 milliGRAM(s) IV Push <User Schedule>  insulin lispro (HumaLOG) corrective regimen sliding scale   SubCutaneous every 6 hours  levothyroxine Injectable 60 MICROGram(s) IV Push at bedtime  Medical Marijuana Awake Oil 2 milliLiter(s),Medical Marijuana Awake Oil 2 milliLiter(s) 2 milliLiter(s) Enteral Tube <User Schedule>  Medical Marijuana Calm Oil 2 milliLiter(s),Medical Marinjuana Calm Oil 2 milliLiter(s) 2 milliLiter(s) Enteral Tube <User Schedule>  Medical Marijuana [place] Oil 2 milliLiter(s),Medical Marijuana [place] Oil 2 milliLiter(s) 2 milliLiter(s) Enteral Tube <User Schedule>  metoprolol tartrate 12.5 milliGRAM(s) Oral two times a day  multivitamin/minerals/iron Oral Solution (CENTRUM) 15 milliLiter(s) Oral daily  naloxone Injectable 0.1 milliGRAM(s) IV Push every 3 minutes  QUEtiapine 25 milliGRAM(s) Oral at bedtime  sevelamer carbonate Powder 800 milliGRAM(s) Oral three times a day with meals  sodium chloride 0.9%. 1000 milliLiter(s) (50 mL/Hr) IV Continuous <Continuous>  tiZANidine 4 milliGRAM(s) Oral <User Schedule>  zinc sulfate 220 milliGRAM(s) Oral daily    MEDICATIONS  (PRN):  acetaminophen  Suppository .. 650 milliGRAM(s) Rectal every 6 hours PRN Temp greater or equal to 38C (100.4F), Mild Pain (1 - 3)  carboxymethylcellulose 0.5% (Preservative-Free) Ophthalmic Solution 1 Drop(s) Both EYES three times a day PRN dry eyes  glucagon  Injectable 1 milliGRAM(s) IntraMuscular once PRN Glucose LESS THAN 70 milligrams/deciliter  HYDROmorphone  Injectable 0.3 milliGRAM(s) IV Push every 4 hours PRN Severe Pain (7 - 10)  nystatin Powder 1 Application(s) Topical three times a day PRN under breasts      ITEMS UNCHECKED ARE NOT PRESENT    PRESENT SYMPTOMS: [x ]Unable to obtain due to poor mentation   Source if other than patient:  [x ]Family   [x ]Team     Pain:  [x ] yes [ ] no    Dyspnea:                           [ ]Mild [ ]Moderate [ ]Severe  Anxiety:                             [ ]Mild [ ]Moderate [ ]Severe  Fatigue:                             [ ]Mild [ ]Moderate [ ]Severe  Nausea:                             [ ]Mild [ ]Moderate [ ]Severe  Loss of appetite:              [ ]Mild [ ]Moderate [ ]Severe  Constipation:                    [ ]Mild [ ]Moderate [ ]Severe    PAIN AD Score:	1    Other Symptoms:  [ ]All other review of systems negative     Karnofsky Performance Score/Palliative Performance Status Version 2:      10   %        PHYSICAL EXAM:  Vital Signs Last 24 Hrs  T(C): 36.3 (06 Aug 2019 09:00), Max: 36.9 (05 Aug 2019 21:45)  T(F): 97.4 (06 Aug 2019 09:00), Max: 98.5 (06 Aug 2019 05:00)  HR: 103 (06 Aug 2019 09:00) (100 - 105)  BP: 113/69 (06 Aug 2019 09:00) (109/69 - 133/65)  BP(mean): --  RR: 18 (06 Aug 2019 09:00) (18 - 18)  SpO2: 95% (06 Aug 2019 09:00) (95% - 100%) I&O's Summary    05 Aug 2019 07:01  -  06 Aug 2019 07:00  --------------------------------------------------------  IN: 825 mL / OUT: 300 mL / NET: 525 mL    06 Aug 2019 07:01  -  06 Aug 2019 11:41  --------------------------------------------------------  IN: 75 mL / OUT: 50 mL / NET: 25 mL     GENERAL:  [ ]Alert  [ ]Oriented x   [ ]Lethargic  [x ]Cachexia  [ ]Unarousable  [ ]Verbal  [x ]Non-Verbal  Behavioral:   [ ] Anxiety  [ ] Delirium [ ] Agitation [ ] Other  HEENT:  [ ]Normal   [x ]Dry mouth   [ ]ET Tube/Trach  [ ]Oral lesions  PULMONARY:   [ ]Clear [ ]Tachypnea  [ x]Audible upper airway secretions   [ ]Rhonchi        [ ]Right [ ]Left [ ]Bilateral  [x ]Crackles        [ ]Right [ ]Left [x ]Bilateral  [ ]Wheezing     [ ]Right [ ]Left [ ]Bilateral  CARDIOVASCULAR:    [x ]Regular [ ]Irregular [ ]Tachy  [ ]Sourav [ ]Murmur [ ]Other  GASTROINTESTINAL:  [ ]Soft  [ ]Distended   [x ]+BS  [ x]Non tender [ ]Tender  [x ]PEG [ ]OGT/ NGT  Last BM: 8/6   GENITOURINARY:  [ ]Normal [ ] Incontinent   [ ]Oliguria/Anuria   [ x]Blackman  MUSCULOSKELETAL:   [ ]Normal   [ ]Weakness  [x ]Bed/Wheelchair bound [ ]Edema  NEUROLOGIC:   [ ]No focal deficits  [ x] Cognitive impairment  [ x] Dysphagia [ ]Dysarthria [ ] Paresis [ ]Other   SKIN:   [ ]Normal   [ x]Pressure ulcer(s)  [x ]Rash    CRITICAL CARE:  [ ] Shock Present  [ ]Septic [ ]Cardiogenic [ ]Neurologic [ ]Hypovolemic  [ ]  Vasopressors [ ]  Inotropes  [ ] Respiratory failure present [ ] Mechanical Ventilation [ ] Non-invasive ventilatory support [ ] High-Flow  [ ] Acute  [ ] Chronic [ ] Hypoxic  [ ] Hypercarbic [ ] Other  [ ] Other organ failure     LABS:                                       7.4    12.40 )-----------( 239      ( 06 Aug 2019 11:53 )             23.3   08-06    133<L>  |  91<L>  |  160<H>  ----------------------------<  123<H>  4.5   |  21<L>  |  4.13<H>    Ca    8.1<L>      06 Aug 2019 09:33            RADIOLOGY & ADDITIONAL STUDIES:  Reviewed.   Protein Calorie Malnutrition Present: [x ] yes [ ] no  [x ] PPSV2 < or = 30%  [ ] significant weight loss [ ] poor nutritional intake [ ] anasarca [ ] catabolic state Albumin, Serum: 2.5 g/dL (08-03-19 @ 13:47)  Artificial Nutrition [ ]     REFERRALS:   [x ]Chaplaincy  [ x] Hospice  [ ]Child Life  [ ]Social Work  [ ]Case management [ ]Holistic Therapy     Goals of Care Document:    Care Coordination Assessment [C. Provider] (06-25-19 @ 09:39)

## 2019-08-06 NOTE — PROGRESS NOTE ADULT - ASSESSMENT
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 Since admission, went to IR to have PEG replaced (6/25/19)    s/p RRT on 7/13 for hypotension in the SBP 50-60s and hypoxia in the 80s, admitted to MICU for shock requiring IV pressor support. Now stable off pressors, on NC. On stress steroids dosing  DVT on AC  s/p RRT for hypotension 7/21- 7/22 overnight  Anemia - without overt/brisk GI bleed, c/w AOCD  Worsening TRANG on CKD    RECS:  -keep G tube bumper at ~2 cm richard on G tube.  -G tube feeds as ordered.   -Renal following  -wound care  -on FeSO4  -monitor BMs  -Renal following  -Continue Pepcid    Overall Poor prognosis  Hospice appears to be most appropriate, await family meeting for [facility]    Adam Miller PA-C    [facility]  [phone]  After hours and weekend coverage [phone]

## 2019-08-06 NOTE — PROGRESS NOTE ADULT - PROBLEM SELECTOR PLAN 4
With worsening Cr  and not a candidate for HD.   Renal note from 8/2 indicated " seems more attentive to the idea that she is doing poorly and likely slowly dying  No HD  No IVF for now Pt with end stage dementia FAST score 7D and PPSv2 of 10%  - family has agreed to place pt on home hospice

## 2019-08-06 NOTE — CHART NOTE - NSCHARTNOTEFT_GEN_A_CORE
Nutrition Follow Up Note  Patient seen for: Follow up on 6 Monti.     Source: Comprehensive chart review, previous RD notes, Pt's /daughter    Diet : NPO with tube feeds    As per chart, Pt is a 69 year old female, PMH type 2 DM, dementia, DVT, CVA, fatty pancreas, PNA, IGT, HTN, ulcerative colitis, acid reflux, anxiety, depression, mouth sores, HLD, asthma. Presented with dislodged PEG tube is now s/p PEG replacement by IR. Her hospital course was complicated by TRANG. Nephology follow pt, TRANG likely multifactorial, pt is non-oliguric, Cr up-trending. Pt also noted to have hypervolemic hyponatremia, Na slowly improving. PT was also treated for sepsis 2/2 aspiration PNA, completed full course of meropenem per ID recs. Palliative care consulted for GOC. Family discussion held today, made patient DNR/DNI. Going on home hospice most likely tomorrow.     Patient's  reports Pt is tolerating tube feeds well without any nausea/vomiting/diarrhea/constipation. Last BM documented yesterday 8/5.     PO intake : N/A    Enteral /Parenteral Nutrition: Nepro @ 25cc/hr x 24 hrs. Enteral feed will provide 600cc total volume 1080kcal and 49g protein (28kcal/kg and 1.2g/kg protein based on dosing wt obtained from 38.6kg 6/24; 20kcal/kg and 0.9g/kg protein based on current wt 55.3kg)    % Weight Change: No new weights. Last weight documented 7/13 at 55.3kg.    Pertinent Medications: MEDICATIONS  (STANDING):  ALBUTerol/ipratropium for Nebulization 3 milliLiter(s) Nebulizer every 6 hours  apixaban 2.5 milliGRAM(s) Oral two times a day  artificial tears (preservative free) Ophthalmic Solution 1 Drop(s) Both EYES four times a day  BACItracin   Ointment 1 Application(s) Topical two times a day  chlorhexidine 2% Cloths 1 Application(s) Topical daily  clonazePAM  Tablet 1 milliGRAM(s) Oral at bedtime  Dakins Solution - 1/4 Strength 1 Application(s) Topical two times a day  dextrose 5%. 1000 milliLiter(s) (50 mL/Hr) IV Continuous <Continuous>  diphenhydrAMINE   Injectable 12.5 milliGRAM(s) IV Push once  famotidine    Tablet 20 milliGRAM(s) Oral every 48 hours  ferrous    sulfate Liquid 300 milliGRAM(s) Enteral Tube daily  formoterol for nebulization 20 MICROGram(s) Nebulizer two times a day  gabapentin   Solution 300 milliGRAM(s) Oral daily  hydrocortisone 1% Cream 1 Application(s) Topical once  hydrocortisone 1% Cream 1 Application(s) Topical two times a day  HYDROmorphone  Injectable 0.2 milliGRAM(s) IV Push <User Schedule>  insulin lispro (HumaLOG) corrective regimen sliding scale   SubCutaneous every 6 hours  levothyroxine Injectable 60 MICROGram(s) IV Push at bedtime  Medical Marijuana Awake Oil 2 milliLiter(s),Medical Marijuana Awake Oil 2 milliLiter(s) 2 milliLiter(s) Enteral Tube <User Schedule>  Medical Marijuana Calm Oil 2 milliLiter(s),Medical [place] Calm Oil 2 milliLiter(s) 2 milliLiter(s) Enteral Tube <User Schedule>  Medical Marijuana [place] Oil 2 milliLiter(s),Medical Marijuana [place] Oil 2 milliLiter(s) 2 milliLiter(s) Enteral Tube <User Schedule>  metoprolol tartrate 12.5 milliGRAM(s) Oral two times a day  multivitamin/minerals/iron Oral Solution (CENTRUM) 15 milliLiter(s) Oral daily  naloxone Injectable 0.1 milliGRAM(s) IV Push every 3 minutes  QUEtiapine 25 milliGRAM(s) Oral at bedtime  sevelamer carbonate Powder 800 milliGRAM(s) Oral three times a day with meals  sodium chloride 0.9%. 1000 milliLiter(s) (50 mL/Hr) IV Continuous <Continuous>  tiZANidine 4 milliGRAM(s) Oral <User Schedule>  zinc sulfate 220 milliGRAM(s) Oral daily    MEDICATIONS  (PRN):  acetaminophen  Suppository .. 650 milliGRAM(s) Rectal every 6 hours PRN Temp greater or equal to 38C (100.4F), Mild Pain (1 - 3)  carboxymethylcellulose 0.5% (Preservative-Free) Ophthalmic Solution 1 Drop(s) Both EYES three times a day PRN dry eyes  glucagon  Injectable 1 milliGRAM(s) IntraMuscular once PRN Glucose LESS THAN 70 milligrams/deciliter  HYDROmorphone  Injectable 0.3 milliGRAM(s) IV Push every 4 hours PRN Severe Pain (7 - 10)  nystatin Powder 1 Application(s) Topical three times a day PRN under breasts    Pertinent Labs: 08-06 @ 09:33: Na 133<L>, <H>, Cr 4.13<H>, <H>, K+ 4.5, Phos --, Mg --, Alk Phos --, ALT/SGPT --, AST/SGOT --, HbA1c --    Finger Sticks:  POCT Blood Glucose.: 117 mg/dL (08-06 @ 11:24)  POCT Blood Glucose.: 144 mg/dL (08-06 @ 06:24)  POCT Blood Glucose.: 136 mg/dL (08-05 @ 23:56)  POCT Blood Glucose.: 128 mg/dL (08-05 @ 17:08)      Skin per nursing documentation:   Edema:    Estimated Needs:   [ ] no change since previous assessment  [ ] recalculated:     Previous Nutrition Diagnosis:   Nutrition Diagnosis is:    New Nutrition Diagnosis:  Related to:    As evidenced by:      Interventions:     Recommend  1)    Monitoring and Evaluation:     Continue to monitor Nutritional intake, Tolerance to diet prescription, weights, labs, skin integrity    RD remains available upon request and will follow up per protocol Nutrition Follow Up Note  Patient seen for: Follow up on 6 Monti.     Source: Comprehensive chart review, previous RD notes, Pt's /daughter    Diet : NPO with tube feeds    As per chart, Pt is a 69 year old female, PMH type 2 DM, dementia, DVT, CVA, fatty pancreas, PNA, IGT, HTN, ulcerative colitis, acid reflux, anxiety, depression, mouth sores, HLD, asthma. Presented with dislodged PEG tube is now s/p PEG replacement by IR. Her hospital course was complicated by TRANG. Nephology follow pt, TRANG likely multifactorial, pt is non-oliguric, Cr up-trending. Pt also noted to have hypervolemic hyponatremia, Na slowly improving. PT was also treated for sepsis 2/2 aspiration PNA, completed full course of meropenem per ID recs. Palliative care consulted for GOC. Family discussion held today, made patient DNR/DNI. Going on home hospice most likely tomorrow.     Patient's  reports Pt is tolerating tube feeds well without any nausea/vomiting/diarrhea/constipation. Last BM documented yesterday 8/5.     PO intake : N/A    Enteral /Parenteral Nutrition: Nepro via PEG @ 25cc/hr x 24 hrs. This tube feeding regimen provides 600cc total volume, 1080kcal,and 49g protein. Meets 28kcal/kg and 1.2g/kg protein based on dosing wt obtained from 38.6kg 6/24; 20kcal/kg and 0.9g/kg protein based on last documented wt 55.3kg.    % Weight Change: No new weights. Last weight documented 7/13 at 55.3kg.    Pertinent Medications: MEDICATIONS  (STANDING):  ALBUTerol/ipratropium for Nebulization 3 milliLiter(s) Nebulizer every 6 hours  apixaban 2.5 milliGRAM(s) Oral two times a day  artificial tears (preservative free) Ophthalmic Solution 1 Drop(s) Both EYES four times a day  BACItracin   Ointment 1 Application(s) Topical two times a day  chlorhexidine 2% Cloths 1 Application(s) Topical daily  clonazePAM  Tablet 1 milliGRAM(s) Oral at bedtime  Dakins Solution - 1/4 Strength 1 Application(s) Topical two times a day  dextrose 5%. 1000 milliLiter(s) (50 mL/Hr) IV Continuous <Continuous>  diphenhydrAMINE   Injectable 12.5 milliGRAM(s) IV Push once  famotidine    Tablet 20 milliGRAM(s) Oral every 48 hours  ferrous    sulfate Liquid 300 milliGRAM(s) Enteral Tube daily  formoterol for nebulization 20 MICROGram(s) Nebulizer two times a day  gabapentin   Solution 300 milliGRAM(s) Oral daily  hydrocortisone 1% Cream 1 Application(s) Topical once  hydrocortisone 1% Cream 1 Application(s) Topical two times a day  HYDROmorphone  Injectable 0.2 milliGRAM(s) IV Push <User Schedule>  insulin lispro (HumaLOG) corrective regimen sliding scale   SubCutaneous every 6 hours  levothyroxine Injectable 60 MICROGram(s) IV Push at bedtime  Medical Marijuana Awake Oil 2 milliLiter(s),Medical Marijuana Awake Oil 2 milliLiter(s) 2 milliLiter(s) Enteral Tube <User Schedule>  Medical Marijuana Calm Oil 2 milliLiter(s),Medical [place] Calm Oil 2 milliLiter(s) 2 milliLiter(s) Enteral Tube <User Schedule>  Medical Marijuana [place] Oil 2 milliLiter(s),Medical Marijuana [place] Oil 2 milliLiter(s) 2 milliLiter(s) Enteral Tube <User Schedule>  metoprolol tartrate 12.5 milliGRAM(s) Oral two times a day  multivitamin/minerals/iron Oral Solution (CENTRUM) 15 milliLiter(s) Oral daily  naloxone Injectable 0.1 milliGRAM(s) IV Push every 3 minutes  QUEtiapine 25 milliGRAM(s) Oral at bedtime  sevelamer carbonate Powder 800 milliGRAM(s) Oral three times a day with meals  sodium chloride 0.9%. 1000 milliLiter(s) (50 mL/Hr) IV Continuous <Continuous>  tiZANidine 4 milliGRAM(s) Oral <User Schedule>  zinc sulfate 220 milliGRAM(s) Oral daily    MEDICATIONS  (PRN):  acetaminophen  Suppository .. 650 milliGRAM(s) Rectal every 6 hours PRN Temp greater or equal to 38C (100.4F), Mild Pain (1 - 3)  carboxymethylcellulose 0.5% (Preservative-Free) Ophthalmic Solution 1 Drop(s) Both EYES three times a day PRN dry eyes  glucagon  Injectable 1 milliGRAM(s) IntraMuscular once PRN Glucose LESS THAN 70 milligrams/deciliter  HYDROmorphone  Injectable 0.3 milliGRAM(s) IV Push every 4 hours PRN Severe Pain (7 - 10)  nystatin Powder 1 Application(s) Topical three times a day PRN under breasts    Pertinent Labs: 08-06 @ 09:33: Na 133<L>, <H>, Cr 4.13<H>, <H>, K+ 4.5, Phos --, Mg --, Alk Phos --, ALT/SGPT --, AST/SGOT --, HbA1c --    Finger Sticks:  POCT Blood Glucose.: 117 mg/dL (08-06 @ 11:24)  POCT Blood Glucose.: 144 mg/dL (08-06 @ 06:24)  POCT Blood Glucose.: 136 mg/dL (08-05 @ 23:56)  POCT Blood Glucose.: 128 mg/dL (08-05 @ 17:08)    Skin per nursing documentation: Pt with multiple pressure injuries--> sacrum stage 4, lower lumbar spine unstageable, left malleolus unstageable, right medial lower leg unstageable, left inner thigh DTI, left inner malleolus stage 2, right buttock DTI, left lower buttock stage 3, left heel DTI, left knee unstageable, left lateral knee unstageable. Skin tear bundles left upper arm, as per flow sheets.    Edema: 2+ generalized, as per flow sheets.    Estimated Needs:   [X] no change since previous assessment    Previous Nutrition Diagnosis: Malnutrition, mild + Increased nutrient needs   Nutrition Diagnosis is: Ongoing, being addressed with EN    New Nutrition Diagnosis: N/A    Recommend  1) Continue with current tube feeding order of Nepro via PEG @ 25cc/hr x 24 hrs. This tube feeding regimen provides 600cc total volume, 1080kcal,and 49g protein. Meets 28kcal/kg and 1.2g/kg protein based on dosing wt obtained from 38.6kg 6/24; 20kcal/kg and 0.9g/kg protein based on last documented wt 55.3kg.  2) Continue with multivitamin, vitamin C and zinc supplements daily to promote wound healing.   3) Recommend adding Iron Packet twice daily for essential amino acids for wound healing.   4) RD to remain available.    Monitoring and Evaluation:     Continue to monitor Nutritional intake, Tolerance to diet prescription, weights, labs, skin integrity    RD remains available upon request and will follow up per protocol Nutrition Follow Up Note  Patient seen for: Follow up on 6 Monti.     Source: Comprehensive chart review, previous RD notes, Pt's /daughter    Diet : NPO with tube feeds    As per chart, Pt is a 69 year old female, PMH type 2 DM, dementia, DVT, CVA, fatty pancreas, PNA, IGT, HTN, ulcerative colitis, acid reflux, anxiety, depression, mouth sores, HLD, asthma. Presented with dislodged PEG tube is now s/p PEG replacement by IR. Her hospital course was complicated by TRANG. Nephology follow pt, TRANG likely multifactorial, pt is non-oliguric, Cr up-trending. Pt also noted to have hypervolemic hyponatremia, Na slowly improving. PT was also treated for sepsis 2/2 aspiration PNA, completed full course of meropenem per ID recs. Palliative care consulted for GOC. Family discussion held today, made patient DNR/DNI. Going on home hospice most likely tomorrow.     Patient's  reports Pt is tolerating tube feeds well without any nausea/vomiting/diarrhea/constipation. Last BM documented yesterday 8/5.     PO intake : N/A    Enteral /Parenteral Nutrition: Nepro via PEG @ 25cc/hr x 24 hrs. This tube feeding regimen provides 600cc total volume, 1080kcal,and 49g protein. Meets 28kcal/kg and 1.2g/kg protein based on dosing wt obtained from 38.6kg 6/24; 20kcal/kg and 0.9g/kg protein based on last documented wt 55.3kg.    Tube feeds as per flow sheets:  8/3--> 300cc (received 50% recommended tube feed)  8/4--> 275cc (received 46% recommended tube feed)  8/5--> 575cc (received 96% recommended tube feed)    % Weight Change: No new weights. Last weight documented 7/13 at 55.3kg.    Pertinent Medications: MEDICATIONS  (STANDING):  ALBUTerol/ipratropium for Nebulization 3 milliLiter(s) Nebulizer every 6 hours  apixaban 2.5 milliGRAM(s) Oral two times a day  artificial tears (preservative free) Ophthalmic Solution 1 Drop(s) Both EYES four times a day  BACItracin   Ointment 1 Application(s) Topical two times a day  chlorhexidine 2% Cloths 1 Application(s) Topical daily  clonazePAM  Tablet 1 milliGRAM(s) Oral at bedtime  Dakins Solution - 1/4 Strength 1 Application(s) Topical two times a day  dextrose 5%. 1000 milliLiter(s) (50 mL/Hr) IV Continuous <Continuous>  diphenhydrAMINE   Injectable 12.5 milliGRAM(s) IV Push once  famotidine    Tablet 20 milliGRAM(s) Oral every 48 hours  ferrous    sulfate Liquid 300 milliGRAM(s) Enteral Tube daily  formoterol for nebulization 20 MICROGram(s) Nebulizer two times a day  gabapentin   Solution 300 milliGRAM(s) Oral daily  hydrocortisone 1% Cream 1 Application(s) Topical once  hydrocortisone 1% Cream 1 Application(s) Topical two times a day  HYDROmorphone  Injectable 0.2 milliGRAM(s) IV Push <User Schedule>  insulin lispro (HumaLOG) corrective regimen sliding scale   SubCutaneous every 6 hours  levothyroxine Injectable 60 MICROGram(s) IV Push at bedtime  Medical Marijuana Awake Oil 2 milliLiter(s),Medical Marijuana Awake Oil 2 milliLiter(s) 2 milliLiter(s) Enteral Tube <User Schedule>  Medical Marijuana Calm Oil 2 milliLiter(s),Medical [place] Calm Oil 2 milliLiter(s) 2 milliLiter(s) Enteral Tube <User Schedule>  Medical Marijuana [place] Oil 2 milliLiter(s),Medical Marijuana [place] Oil 2 milliLiter(s) 2 milliLiter(s) Enteral Tube <User Schedule>  metoprolol tartrate 12.5 milliGRAM(s) Oral two times a day  multivitamin/minerals/iron Oral Solution (CENTRUM) 15 milliLiter(s) Oral daily  naloxone Injectable 0.1 milliGRAM(s) IV Push every 3 minutes  QUEtiapine 25 milliGRAM(s) Oral at bedtime  sevelamer carbonate Powder 800 milliGRAM(s) Oral three times a day with meals  sodium chloride 0.9%. 1000 milliLiter(s) (50 mL/Hr) IV Continuous <Continuous>  tiZANidine 4 milliGRAM(s) Oral <User Schedule>  zinc sulfate 220 milliGRAM(s) Oral daily    MEDICATIONS  (PRN):  acetaminophen  Suppository .. 650 milliGRAM(s) Rectal every 6 hours PRN Temp greater or equal to 38C (100.4F), Mild Pain (1 - 3)  carboxymethylcellulose 0.5% (Preservative-Free) Ophthalmic Solution 1 Drop(s) Both EYES three times a day PRN dry eyes  glucagon  Injectable 1 milliGRAM(s) IntraMuscular once PRN Glucose LESS THAN 70 milligrams/deciliter  HYDROmorphone  Injectable 0.3 milliGRAM(s) IV Push every 4 hours PRN Severe Pain (7 - 10)  nystatin Powder 1 Application(s) Topical three times a day PRN under breasts    Pertinent Labs: 08-06 @ 09:33: Na 133<L>, <H>, Cr 4.13<H>, <H>, K+ 4.5, Phos --, Mg --, Alk Phos --, ALT/SGPT --, AST/SGOT --, HbA1c --    Finger Sticks:  POCT Blood Glucose.: 117 mg/dL (08-06 @ 11:24)  POCT Blood Glucose.: 144 mg/dL (08-06 @ 06:24)  POCT Blood Glucose.: 136 mg/dL (08-05 @ 23:56)  POCT Blood Glucose.: 128 mg/dL (08-05 @ 17:08)    Skin per nursing documentation: Pt with multiple pressure injuries--> sacrum stage 4, lower lumbar spine unstageable, left malleolus unstageable, right medial lower leg unstageable, left inner thigh DTI, left inner malleolus stage 2, right buttock DTI, left lower buttock stage 3, left heel DTI, left knee unstageable, left lateral knee unstageable. Skin tear bundles left upper arm, as per flow sheets.    Edema: 2+ generalized, as per flow sheets.    Estimated Needs:   [X] no change since previous assessment    Previous Nutrition Diagnosis: Malnutrition, mild + Increased nutrient needs   Nutrition Diagnosis is: Ongoing, being addressed with EN    New Nutrition Diagnosis: N/A    Recommend  1) Continue with current tube feeding order of Nepro via PEG @ 25cc/hr x 24 hrs. This tube feeding regimen provides 600cc total volume, 1080kcal,and 49g protein. Meets 28kcal/kg and 1.2g/kg protein based on dosing wt obtained from 38.6kg 6/24; 20kcal/kg and 0.9g/kg protein based on last documented wt 55.3kg.  2) Continue with multivitamin, vitamin C and zinc supplements daily to promote wound healing.   3) Recommend adding Iron Packet twice daily for essential amino acids for wound healing.   4) RD to remain available.    Monitoring and Evaluation:     Continue to monitor Nutritional intake, Tolerance to diet prescription, weights, labs, skin integrity    RD remains available upon request and will follow up per protocol Nutrition Follow Up Note  Patient seen for: Follow up on 6 Monti.     Source: Comprehensive chart review, previous RD notes, Pt's /daughter    Diet : NPO with tube feeds    As per chart, Pt is a 69 year old female, PMH type 2 DM, dementia, DVT, CVA, fatty pancreas, PNA, IGT, HTN, ulcerative colitis, acid reflux, anxiety, depression, mouth sores, HLD, asthma. Presented with dislodged PEG tube is now s/p PEG replacement by IR. Her hospital course was complicated by TRANG. Nephology follow pt, TRANG likely multifactorial, pt is non-oliguric, Cr up-trending. Pt also noted to have hypervolemic hyponatremia, Na slowly improving. PT was also treated for sepsis 2/2 aspiration PNA, completed full course of meropenem per ID recs. Palliative care consulted for GOC. Family discussion held today, made patient DNR/DNI. Going on home hospice most likely tomorrow.     Patient's  reports Pt is tolerating tube feeds well without any nausea/vomiting/diarrhea/constipation. As per last RD note, "Per nephrologist, pt's BUN elevated and plan to reduce enteral feed goal rate to Nepro @ 25cc/hr x 24 hrs". Last BM documented yesterday 8/5.     PO intake : N/A    Enteral /Parenteral Nutrition: Nepro via PEG @ 25cc/hr x 24 hrs. This tube feeding regimen provides 600cc total volume, 1080kcal,and 49g protein. Meets 28kcal/kg and 1.2g/kg protein based on dosing wt obtained from 38.6kg 6/24; 20kcal/kg and 0.9g/kg protein based on last documented wt 55.3kg.    Tube feeds as per flow sheets:  8/3--> 300cc (received 50% recommended tube feed)  8/4--> 275cc (received 46% recommended tube feed)  8/5--> 575cc (received 96% recommended tube feed)    % Weight Change: No new weights. Last weight documented bed scale weight 7/13 at 55.3kg. Dosing weight from 6/24 documented at 38.6kg.     Pertinent Medications: MEDICATIONS  (STANDING):  ALBUTerol/ipratropium for Nebulization 3 milliLiter(s) Nebulizer every 6 hours  apixaban 2.5 milliGRAM(s) Oral two times a day  artificial tears (preservative free) Ophthalmic Solution 1 Drop(s) Both EYES four times a day  BACItracin   Ointment 1 Application(s) Topical two times a day  chlorhexidine 2% Cloths 1 Application(s) Topical daily  clonazePAM  Tablet 1 milliGRAM(s) Oral at bedtime  Dakins Solution - 1/4 Strength 1 Application(s) Topical two times a day  dextrose 5%. 1000 milliLiter(s) (50 mL/Hr) IV Continuous <Continuous>  diphenhydrAMINE   Injectable 12.5 milliGRAM(s) IV Push once  famotidine    Tablet 20 milliGRAM(s) Oral every 48 hours  ferrous    sulfate Liquid 300 milliGRAM(s) Enteral Tube daily  formoterol for nebulization 20 MICROGram(s) Nebulizer two times a day  gabapentin   Solution 300 milliGRAM(s) Oral daily  hydrocortisone 1% Cream 1 Application(s) Topical once  hydrocortisone 1% Cream 1 Application(s) Topical two times a day  HYDROmorphone  Injectable 0.2 milliGRAM(s) IV Push <User Schedule>  insulin lispro (HumaLOG) corrective regimen sliding scale   SubCutaneous every 6 hours  levothyroxine Injectable 60 MICROGram(s) IV Push at bedtime  Medical Marijuana Awake Oil 2 milliLiter(s),Medical Marijuana Awake Oil 2 milliLiter(s) 2 milliLiter(s) Enteral Tube <User Schedule>  Medical Marijuana Calm Oil 2 milliLiter(s),Medical [place] Calm Oil 2 milliLiter(s) 2 milliLiter(s) Enteral Tube <User Schedule>  Medical Marijuana [place] Oil 2 milliLiter(s),Medical Marijuana [place] Oil 2 milliLiter(s) 2 milliLiter(s) Enteral Tube <User Schedule>  metoprolol tartrate 12.5 milliGRAM(s) Oral two times a day  multivitamin/minerals/iron Oral Solution (CENTRUM) 15 milliLiter(s) Oral daily  naloxone Injectable 0.1 milliGRAM(s) IV Push every 3 minutes  QUEtiapine 25 milliGRAM(s) Oral at bedtime  sevelamer carbonate Powder 800 milliGRAM(s) Oral three times a day with meals  sodium chloride 0.9%. 1000 milliLiter(s) (50 mL/Hr) IV Continuous <Continuous>  tiZANidine 4 milliGRAM(s) Oral <User Schedule>  zinc sulfate 220 milliGRAM(s) Oral daily    MEDICATIONS  (PRN):  acetaminophen  Suppository .. 650 milliGRAM(s) Rectal every 6 hours PRN Temp greater or equal to 38C (100.4F), Mild Pain (1 - 3)  carboxymethylcellulose 0.5% (Preservative-Free) Ophthalmic Solution 1 Drop(s) Both EYES three times a day PRN dry eyes  glucagon  Injectable 1 milliGRAM(s) IntraMuscular once PRN Glucose LESS THAN 70 milligrams/deciliter  HYDROmorphone  Injectable 0.3 milliGRAM(s) IV Push every 4 hours PRN Severe Pain (7 - 10)  nystatin Powder 1 Application(s) Topical three times a day PRN under breasts    Pertinent Labs: 08-06 @ 09:33: Na 133<L>, <H>, Cr 4.13<H>, <H>, K+ 4.5, Phos --, Mg --, Alk Phos --, ALT/SGPT --, AST/SGOT --, HbA1c --    Finger Sticks:  POCT Blood Glucose.: 117 mg/dL (08-06 @ 11:24)  POCT Blood Glucose.: 144 mg/dL (08-06 @ 06:24)  POCT Blood Glucose.: 136 mg/dL (08-05 @ 23:56)  POCT Blood Glucose.: 128 mg/dL (08-05 @ 17:08)    Skin per nursing documentation: Pt with multiple pressure injuries--> sacrum stage 4, lower lumbar spine unstageable, left malleolus unstageable, right medial lower leg unstageable, left inner thigh DTI, left inner malleolus stage 2, right buttock DTI, left lower buttock stage 3, left heel DTI, left knee unstageable, left lateral knee unstageable. Skin tear bundles left upper arm, as per flow sheets.    Edema: 2+ generalized, as per flow sheets.    Estimated Needs:   [X] no change since previous assessment    Previous Nutrition Diagnosis: Malnutrition, mild + Increased nutrient needs   Nutrition Diagnosis is: Ongoing, being addressed with EN    New Nutrition Diagnosis: N/A    Recommend  1) Per last RD note, recommendations from Nephrology to continue with current tube feeding order of Nepro via PEG @ 25cc/hr x 24 hrs. This tube feeding regimen provides 600cc total volume, 1080kcal,and 49g protein. Meets 28kcal/kg and 1.2g/kg protein based on dosing wt obtained from 38.6kg 6/24; 20kcal/kg and 0.9g/kg protein based on last documented wt 55.3kg.  2) In the setting of increased nutrient needs, recommend increase tube feeding goal rate as medically appropriate.   3) Continue with multivitamin, vitamin C and zinc supplements daily to promote wound healing.   4) Recommend adding Iron Packet twice daily for essential amino acids for wound healing.   5) RD to remain available.    Monitoring and Evaluation:     Continue to monitor Nutritional intake, Tolerance to diet prescription, weights, labs, skin integrity    RD remains available upon request and will follow up per protocol

## 2019-08-06 NOTE — PROGRESS NOTE ADULT - SUBJECTIVE AND OBJECTIVE BOX
NEPHROLOGY-[facility] [phone]        Patient seen and examined in bed.  No new changes noted         MEDICATIONS  (STANDING):  ALBUTerol/ipratropium for Nebulization 3 milliLiter(s) Nebulizer every 6 hours  apixaban 2.5 milliGRAM(s) Oral two times a day  artificial tears (preservative free) Ophthalmic Solution 1 Drop(s) Both EYES four times a day  BACItracin   Ointment 1 Application(s) Topical two times a day  chlorhexidine 2% Cloths 1 Application(s) Topical daily  clonazePAM  Tablet 1 milliGRAM(s) Oral at bedtime  Dakins Solution - 1/4 Strength 1 Application(s) Topical two times a day  dextrose 5%. 1000 milliLiter(s) (50 mL/Hr) IV Continuous <Continuous>  famotidine    Tablet 20 milliGRAM(s) Oral every 48 hours  ferrous    sulfate Liquid 300 milliGRAM(s) Enteral Tube daily  formoterol for nebulization 20 MICROGram(s) Nebulizer two times a day  gabapentin   Solution 300 milliGRAM(s) Oral daily  hydrocortisone 1% Cream 1 Application(s) Topical once  HYDROmorphone  Injectable 0.2 milliGRAM(s) IV Push <User Schedule>  insulin lispro (HumaLOG) corrective regimen sliding scale   SubCutaneous every 6 hours  levothyroxine Injectable 60 MICROGram(s) IV Push at bedtime  Medical Marijuana Awake Oil 2 milliLiter(s),Medical Marijuana Awake Oil 2 milliLiter(s) 2 milliLiter(s) Enteral Tube <User Schedule>  Medical Marijuana Calm Oil 2 milliLiter(s),Medical Marinjuana Calm Oil 2 milliLiter(s) 2 milliLiter(s) Enteral Tube <User Schedule>  Medical Marijuana [place] Oil 2 milliLiter(s),Medical Marijuana [place] Oil 2 milliLiter(s) 2 milliLiter(s) Enteral Tube <User Schedule>  metoprolol tartrate 12.5 milliGRAM(s) Oral two times a day  multivitamin/minerals/iron Oral Solution (CENTRUM) 15 milliLiter(s) Oral daily  naloxone Injectable 0.1 milliGRAM(s) IV Push every 3 minutes  QUEtiapine 25 milliGRAM(s) Oral at bedtime  sevelamer carbonate Powder 800 milliGRAM(s) Oral three times a day with meals  sodium chloride 0.9%. 1000 milliLiter(s) (50 mL/Hr) IV Continuous <Continuous>  tiZANidine 4 milliGRAM(s) Oral <User Schedule>  zinc sulfate 220 milliGRAM(s) Oral daily      VITAL:  T(C): , Max: 36.9 (08-05-19 @ 21:45)  T(F): , Max: 98.5 (08-06-19 @ 05:00)  HR: 103 (08-06-19 @ 09:00)  BP: 113/69 (08-06-19 @ 09:00)  BP(mean): --  RR: 18 (08-06-19 @ 09:00)  SpO2: 95% (08-06-19 @ 09:00)  Wt(kg): --    I and O's:    08-05 @ 07:01  -  08-06 @ 07:00  --------------------------------------------------------  IN: 825 mL / OUT: 300 mL / NET: 525 mL    08-06 @ 07:01  -  08-06 @ 11:02  --------------------------------------------------------  IN: 75 mL / OUT: 50 mL / NET: 25 mL          PHYSICAL EXAM:    Constitutional: cachetic   Neck:  No JVD  Respiratory: Course   Cardiovascular: S1 and S2  Gastrointestinal: BS+, soft, NT/ND  Extremities: +  peripheral edema  Neurological: unable   : +  Blackman  Skin: No rashes  Access: Not applicable    LABS:                        7.5    13.37 )-----------( 212      ( 05 Aug 2019 07:54 )             23.5     08-06    133<L>  |  91<L>  |  160<H>  ----------------------------<  123<H>  4.5   |  21<L>  |  4.13<H>    Ca    8.1<L>      06 Aug 2019 09:33            Urine Studies:          RADIOLOGY & ADDITIONAL STUDIES:

## 2019-08-06 NOTE — PROGRESS NOTE ADULT - PROBLEM SELECTOR PLAN 1
- Patient received Dilaudid 1 mg over 24 hours.   - c/w Dilaudid to Dilaudid 0.3 mg Q4h PRN for break through pain and Dilaudid 0.2 mg Q8h ATC  - Pt dose not require Fentanyl patch based on 24hr requirement.   - On medical Marijuana   - c/w gabapentin but consider decreasing it to 200 mg q daily due to eGFR   - Continue Tylenol   - continue using PAINAD score for assessment - c/w Dilaudid to Dilaudid 0.3 mg Q4h PRN for break through pain and Dilaudid 0.2 mg Q8h ATC. At the time of DC, please prescribe Dilaudid solution 1 mg/1ml, 0.3 ml q 4 PRN and 0.2 ml q 8 ATC.   - On medical Marijuana   - c/w gabapentin but consider decreasing it to 200 mg q daily due to eGFR   - Continue Tylenol   - continue using PAINAD score for assessment

## 2019-08-06 NOTE — PROGRESS NOTE ADULT - SUBJECTIVE AND OBJECTIVE BOX
Patient is a 69y old  Female who presented with a chief complaint of PEG tube dislodgement, fever, leukocytosis (06 Aug 2019 09:59)      INTERVAL HPI/OVERNIGHT EVENTS:  no new events overnight  planned family meeting today with Palliative/ Hospice team  no rectal bleeding or melena  on PEG feeds (nepro) at 25 cc/hour  no fever    MEDICATIONS  (STANDING):  ALBUTerol/ipratropium for Nebulization 3 milliLiter(s) Nebulizer every 6 hours  apixaban 2.5 milliGRAM(s) Oral two times a day  artificial tears (preservative free) Ophthalmic Solution 1 Drop(s) Both EYES four times a day  BACItracin   Ointment 1 Application(s) Topical two times a day  chlorhexidine 2% Cloths 1 Application(s) Topical daily  clonazePAM  Tablet 1 milliGRAM(s) Oral at bedtime  Dakins Solution - 1/4 Strength 1 Application(s) Topical two times a day  dextrose 5%. 1000 milliLiter(s) (50 mL/Hr) IV Continuous <Continuous>  famotidine    Tablet 20 milliGRAM(s) Oral every 48 hours  ferrous    sulfate Liquid 300 milliGRAM(s) Enteral Tube daily  formoterol for nebulization 20 MICROGram(s) Nebulizer two times a day  gabapentin   Solution 300 milliGRAM(s) Oral daily  hydrocortisone 1% Cream 1 Application(s) Topical once  HYDROmorphone  Injectable 0.2 milliGRAM(s) IV Push <User Schedule>  insulin lispro (HumaLOG) corrective regimen sliding scale   SubCutaneous every 6 hours  levothyroxine Injectable 60 MICROGram(s) IV Push at bedtime  Medical Marijuana Awake Oil 2 milliLiter(s),Medical Marijuana Awake Oil 2 milliLiter(s) 2 milliLiter(s) Enteral Tube <User Schedule>  Medical Marijuana Calm Oil 2 milliLiter(s),Medical Marinjuana Calm Oil 2 milliLiter(s) 2 milliLiter(s) Enteral Tube <User Schedule>  Medical Marijuana [place] Oil 2 milliLiter(s),Medical Marijuana [place] Oil 2 milliLiter(s) 2 milliLiter(s) Enteral Tube <User Schedule>  metoprolol tartrate 12.5 milliGRAM(s) Oral two times a day  multivitamin/minerals/iron Oral Solution (CENTRUM) 15 milliLiter(s) Oral daily  naloxone Injectable 0.1 milliGRAM(s) IV Push every 3 minutes  QUEtiapine 25 milliGRAM(s) Oral at bedtime  sevelamer carbonate Powder 800 milliGRAM(s) Oral three times a day with meals  sodium chloride 0.9%. 1000 milliLiter(s) (50 mL/Hr) IV Continuous <Continuous>  tiZANidine 4 milliGRAM(s) Oral <User Schedule>  zinc sulfate 220 milliGRAM(s) Oral daily    MEDICATIONS  (PRN):  acetaminophen  Suppository .. 650 milliGRAM(s) Rectal every 6 hours PRN Temp greater or equal to 38C (100.4F), Mild Pain (1 - 3)  carboxymethylcellulose 0.5% (Preservative-Free) Ophthalmic Solution 1 Drop(s) Both EYES three times a day PRN dry eyes  glucagon  Injectable 1 milliGRAM(s) IntraMuscular once PRN Glucose LESS THAN 70 milligrams/deciliter  HYDROmorphone  Injectable 0.3 milliGRAM(s) IV Push every 4 hours PRN Severe Pain (7 - 10)  nystatin Powder 1 Application(s) Topical three times a day PRN under breasts      Allergies  ASA; dye contrast (Anaphylaxis)  aspirin (Short breath)  divalproex sodium (Other (Unknown))  Flowers and Plants (Short breath)  Haldol (Other (Unknown))  penicillin (Short breath; Rash)  sulfa drugs (Short breath; Rash)  vancomycin (Rash; Urticaria; Hives)  Xanax (Other (Unknown))      Review of Systems:  Unable to obtain from patient     Vital Signs Last 24 Hrs  T(C): 36.3 (06 Aug 2019 09:00), Max: 36.9 (05 Aug 2019 21:45)  T(F): 97.4 (06 Aug 2019 09:00), Max: 98.5 (06 Aug 2019 05:00)  HR: 103 (06 Aug 2019 09:00) (100 - 105)  BP: 113/69 (06 Aug 2019 09:00) (109/69 - 133/65)  BP(mean): --  RR: 18 (06 Aug 2019 09:00) (18 - 18)  SpO2: 95% (06 Aug 2019 09:00) (95% - 100%)    PHYSICAL EXAM:  Constitutional: frail elderly chronically ill appearing +severe contractures, non verbal  spouse at bedside +anasarca and muscle wasting  Neck: No JVD  Respiratory: + rhonchi , decreased BS bases  Cardiovascular: S1 and S2 regular  Gastrointestinal: BS+, soft, +G tube  bumper at 2 cm richard, no leakage   Extremities: anasarca/+edema and muscle wasting +dressings in place ++contractures  Vascular: 2+ peripheral pulses  Neurological: lethargic, no focal asymmetry  Psychiatric: non verbal   Skin: anicteric  +skin dressings and heel pad cushions in place  multiple decubiti (wound care following)    LABS:                        7.5    13.37 )-----------( 212      ( 05 Aug 2019 07:54 )             23.5     Hemoglobin: 7.2 g/dL <L> [11.5 - 15.5] (08-04 @ 09:19)  Hemoglobin: 7.9 g/dL <L> [11.5 - 15.5] (08-03 @ 18:10)    Mean Cell Volume: 89.4 fl (08.05.19 @ 07:54)        08-06    133<L>  |  91<L>  |  x   ----------------------------<  123<H>  4.5   |  21<L>  |  4.13<H>    Ca    8.1<L>      06 Aug 2019 09:33          RADIOLOGY & ADDITIONAL TESTS:

## 2019-08-06 NOTE — PROGRESS NOTE ADULT - PROBLEM SELECTOR PLAN 5
Pt with end stage dementia FAST score 7D and PPSv2 of 10%  - family has agreed to place pt on home hospice noted to have multiple pressure ulcers  -PPS < 20%   - Patient needs full nursing care  - c/w wound care per primary team   - c/w leslye

## 2019-08-06 NOTE — PROGRESS NOTE ADULT - PROBLEM SELECTOR PLAN 6
Family meeting took place at 10 am today along with hospice team.  and 2 daughter have agreed to place pt on home hospice. GOC discussion held at length, family in agreement and have decided that patient should be DNR/DNI. MOLST form completed. Family meeting took place at 10 am today along with hospice team.  and 2 daughter have agreed to place pt on home hospice. GOC discussion held at length, family in agreement and have decided that patient should be DNR/DNI. MOLST form completed.  50' were spent in ACP and completion of MOLST form.  On DC please also prescribe Ativan concentrated 0.2 mg q 4 PRN for agitation, anxiety, or respiratory distress after back to back opioids are given. Unclear if it is due to Dilaudid  Benadryl 12.5 to 25 mg q 6 PRN   Hydrocortisone bid

## 2019-08-06 NOTE — PROGRESS NOTE ADULT - PROBLEM SELECTOR PLAN 3
noted to have multiple pressure ulcers  -PPS < 20%   - Patient needs full nursing care  - c/w wound care per primary team   - c/w leslye With worsening Cr  and not a candidate for HD.   Renal note from 8/2 indicated " seems more attentive to the idea that she is doing poorly and likely slowly dying  No HD  No IVF for now

## 2019-08-06 NOTE — GOALS OF CARE CONVERSATION - PERSONAL ADVANCE DIRECTIVE - CONVERSATION DETAILS
Patient's family was able to give verbalized understanding about her advanced illness and very poor prognosis ( likely hours to days). Her  indicated that at this point, the patient's dignity and comfort are of upmost importance. His family also indicated the patient may not have wanted heroic measures at this point and therefore a MOLST form was completed with DNR/I. We also discussed about tube feeds and IV hydration. For now, her surrogate would like to continue tube feeds and IV hydration; however, he understand that these Rxs may cause adverse effects (fluid overload) and if so he will be open to holding or DC them. Finally her family indicated the patient's wish was for dying at home. Hospice services were explained by the hospice nurse liaison who will be working with the family and the primary team on a DC plan.     The patient' family had question about clinical changes before dying and information about it was given     50' were spent in ACP.

## 2019-08-06 NOTE — PROGRESS NOTE ADULT - ASSESSMENT
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, left knee fracture, DM, RLE DVT, p/w dislodged PEG tube s/p replacement of PEG by IR c/b TRANG   - TRANG: multifactorial with solitary kidney - likely ATN - non-oliguric -    - metabolic acidosis with high anion gap: from TRANG + sepsis - stable  - hyponatremia:    - hypervolemic on exam  - anemia: of chronic dz - hgb trending down   - hyperphosphatemia: due to CKD/TRANG - on binders    RECOMMEND:  - palliative care meeting today   - monitor strict IOs  - continue TF with nepro as able via PEG per GI  - continue Renvela powder 800 mg TID via PEG  - maintain chronic gavin catheter   - dose medication for a GFR ~ 10-15 mL/min      [facility] [employer]  [phone]

## 2019-08-07 DIAGNOSIS — R06.00 DYSPNEA, UNSPECIFIED: ICD-10-CM

## 2019-08-07 PROCEDURE — 99233 SBSQ HOSP IP/OBS HIGH 50: CPT | Mod: GC

## 2019-08-07 PROCEDURE — 99497 ADVNCD CARE PLAN 30 MIN: CPT | Mod: 25

## 2019-08-07 RX ORDER — HYDROMORPHONE HYDROCHLORIDE 2 MG/ML
0.2 INJECTION INTRAMUSCULAR; INTRAVENOUS; SUBCUTANEOUS EVERY 6 HOURS
Refills: 0 | Status: DISCONTINUED | OUTPATIENT
Start: 2019-08-07 | End: 2019-08-08

## 2019-08-07 RX ORDER — ROBINUL 0.2 MG/ML
0.4 INJECTION INTRAMUSCULAR; INTRAVENOUS EVERY 6 HOURS
Refills: 0 | Status: DISCONTINUED | OUTPATIENT
Start: 2019-08-07 | End: 2019-08-08

## 2019-08-07 RX ORDER — HYDROMORPHONE HYDROCHLORIDE 2 MG/ML
0.3 INJECTION INTRAMUSCULAR; INTRAVENOUS; SUBCUTANEOUS
Refills: 0 | Status: DISCONTINUED | OUTPATIENT
Start: 2019-08-07 | End: 2019-08-08

## 2019-08-07 RX ORDER — ERGOCALCIFEROL 1.25 MG/1
1 CAPSULE ORAL
Qty: 0 | Refills: 0 | DISCHARGE

## 2019-08-07 RX ORDER — NYSTATIN CREAM 100000 [USP'U]/G
1 CREAM TOPICAL
Qty: 0 | Refills: 0 | DISCHARGE

## 2019-08-07 RX ORDER — ROBINUL 0.2 MG/ML
0.2 INJECTION INTRAMUSCULAR; INTRAVENOUS ONCE
Refills: 0 | Status: COMPLETED | OUTPATIENT
Start: 2019-08-07 | End: 2019-08-07

## 2019-08-07 RX ORDER — GABAPENTIN 400 MG/1
200 CAPSULE ORAL DAILY
Refills: 0 | Status: DISCONTINUED | OUTPATIENT
Start: 2019-08-07 | End: 2019-08-08

## 2019-08-07 RX ORDER — BUDESONIDE AND FORMOTEROL FUMARATE DIHYDRATE 160; 4.5 UG/1; UG/1
2 AEROSOL RESPIRATORY (INHALATION)
Qty: 0 | Refills: 0 | DISCHARGE

## 2019-08-07 RX ADMIN — HYDROMORPHONE HYDROCHLORIDE 0.2 MILLIGRAM(S): 2 INJECTION INTRAMUSCULAR; INTRAVENOUS; SUBCUTANEOUS at 17:48

## 2019-08-07 RX ADMIN — HYDROMORPHONE HYDROCHLORIDE 0.2 MILLIGRAM(S): 2 INJECTION INTRAMUSCULAR; INTRAVENOUS; SUBCUTANEOUS at 17:33

## 2019-08-07 RX ADMIN — Medication 0.2 MILLIGRAM(S): at 20:22

## 2019-08-07 RX ADMIN — HYDROMORPHONE HYDROCHLORIDE 0.3 MILLIGRAM(S): 2 INJECTION INTRAMUSCULAR; INTRAVENOUS; SUBCUTANEOUS at 16:03

## 2019-08-07 RX ADMIN — HYDROMORPHONE HYDROCHLORIDE 0.3 MILLIGRAM(S): 2 INJECTION INTRAMUSCULAR; INTRAVENOUS; SUBCUTANEOUS at 23:07

## 2019-08-07 RX ADMIN — HYDROMORPHONE HYDROCHLORIDE 0.3 MILLIGRAM(S): 2 INJECTION INTRAMUSCULAR; INTRAVENOUS; SUBCUTANEOUS at 00:41

## 2019-08-07 RX ADMIN — HYDROMORPHONE HYDROCHLORIDE 0.2 MILLIGRAM(S): 2 INJECTION INTRAMUSCULAR; INTRAVENOUS; SUBCUTANEOUS at 08:31

## 2019-08-07 RX ADMIN — ROBINUL 0.2 MILLIGRAM(S): 0.2 INJECTION INTRAMUSCULAR; INTRAVENOUS at 02:34

## 2019-08-07 RX ADMIN — HYDROMORPHONE HYDROCHLORIDE 0.2 MILLIGRAM(S): 2 INJECTION INTRAMUSCULAR; INTRAVENOUS; SUBCUTANEOUS at 07:36

## 2019-08-07 RX ADMIN — HYDROMORPHONE HYDROCHLORIDE 0.3 MILLIGRAM(S): 2 INJECTION INTRAMUSCULAR; INTRAVENOUS; SUBCUTANEOUS at 20:18

## 2019-08-07 RX ADMIN — HYDROMORPHONE HYDROCHLORIDE 0.3 MILLIGRAM(S): 2 INJECTION INTRAMUSCULAR; INTRAVENOUS; SUBCUTANEOUS at 15:27

## 2019-08-07 RX ADMIN — HYDROMORPHONE HYDROCHLORIDE 0.3 MILLIGRAM(S): 2 INJECTION INTRAMUSCULAR; INTRAVENOUS; SUBCUTANEOUS at 22:37

## 2019-08-07 RX ADMIN — HYDROMORPHONE HYDROCHLORIDE 0.2 MILLIGRAM(S): 2 INJECTION INTRAMUSCULAR; INTRAVENOUS; SUBCUTANEOUS at 13:27

## 2019-08-07 RX ADMIN — HYDROMORPHONE HYDROCHLORIDE 0.2 MILLIGRAM(S): 2 INJECTION INTRAMUSCULAR; INTRAVENOUS; SUBCUTANEOUS at 14:56

## 2019-08-07 RX ADMIN — Medication 3 MILLILITER(S): at 00:47

## 2019-08-07 RX ADMIN — HYDROMORPHONE HYDROCHLORIDE 0.3 MILLIGRAM(S): 2 INJECTION INTRAMUSCULAR; INTRAVENOUS; SUBCUTANEOUS at 19:48

## 2019-08-07 RX ADMIN — HYDROMORPHONE HYDROCHLORIDE 0.3 MILLIGRAM(S): 2 INJECTION INTRAMUSCULAR; INTRAVENOUS; SUBCUTANEOUS at 10:49

## 2019-08-07 RX ADMIN — Medication 650 MILLIGRAM(S): at 02:46

## 2019-08-07 RX ADMIN — Medication 0.5 MILLIGRAM(S): at 02:46

## 2019-08-07 RX ADMIN — HYDROMORPHONE HYDROCHLORIDE 0.3 MILLIGRAM(S): 2 INJECTION INTRAMUSCULAR; INTRAVENOUS; SUBCUTANEOUS at 11:40

## 2019-08-07 NOTE — PROGRESS NOTE ADULT - PROBLEM SELECTOR PLAN 2
- c/w Dilaudid as above   - Ativan 0.2 mg q2h PRN for respiratory distress after back to back opioids are given. Please call palliate more than 3 PRN meds are given in < 24hrs  - started on Robinul 0.4 mg q6h prn for airway secretions

## 2019-08-07 NOTE — PROGRESS NOTE ADULT - SUBJECTIVE AND OBJECTIVE BOX
NEPHROLOGY-[facility] [phone]        Patient seen and examined in bed.  She was about the same         MEDICATIONS  (STANDING):  ALBUTerol/ipratropium for Nebulization 3 milliLiter(s) Nebulizer every 6 hours  apixaban 2.5 milliGRAM(s) Oral two times a day  artificial tears (preservative free) Ophthalmic Solution 1 Drop(s) Both EYES four times a day  BACItracin   Ointment 1 Application(s) Topical two times a day  chlorhexidine 2% Cloths 1 Application(s) Topical daily  clonazePAM  Tablet 1 milliGRAM(s) Oral at bedtime  Dakins Solution - 1/4 Strength 1 Application(s) Topical two times a day  dextrose 5%. 1000 milliLiter(s) (50 mL/Hr) IV Continuous <Continuous>  famotidine    Tablet 20 milliGRAM(s) Oral every 48 hours  ferrous    sulfate Liquid 300 milliGRAM(s) Enteral Tube daily  formoterol for nebulization 20 MICROGram(s) Nebulizer two times a day  gabapentin   Solution 300 milliGRAM(s) Oral daily  hydrocortisone 1% Cream 1 Application(s) Topical once  hydrocortisone 1% Cream 1 Application(s) Topical two times a day  HYDROmorphone  Injectable 0.2 milliGRAM(s) IV Push <User Schedule>  insulin lispro (HumaLOG) corrective regimen sliding scale   SubCutaneous every 6 hours  levothyroxine Injectable 60 MICROGram(s) IV Push at bedtime  Medical Marijuana Awake Oil 2 milliLiter(s),Medical Marijuana Awake Oil 2 milliLiter(s) 2 milliLiter(s) Enteral Tube <User Schedule>  Medical Marijuana Calm Oil 2 milliLiter(s),Medical Marinjuana Calm Oil 2 milliLiter(s) 2 milliLiter(s) Enteral Tube <User Schedule>  Medical Marijuana [place] Oil 2 milliLiter(s),Medical Marijuana [place] Oil 2 milliLiter(s) 2 milliLiter(s) Enteral Tube <User Schedule>  metoprolol tartrate 12.5 milliGRAM(s) Oral two times a day  multivitamin/minerals/iron Oral Solution (CENTRUM) 15 milliLiter(s) Oral daily  naloxone Injectable 0.1 milliGRAM(s) IV Push every 3 minutes  QUEtiapine 25 milliGRAM(s) Oral at bedtime  sevelamer carbonate Powder 800 milliGRAM(s) Oral three times a day with meals  sodium chloride 0.9%. 1000 milliLiter(s) (50 mL/Hr) IV Continuous <Continuous>  tiZANidine 4 milliGRAM(s) Oral <User Schedule>  zinc sulfate 220 milliGRAM(s) Oral daily      VITAL:  T(C): , Max: 37.6 (08-07-19 @ 06:00)  T(F): , Max: 99.7 (08-07-19 @ 06:00)  HR: 108 (08-07-19 @ 06:00)  BP: 92/48 (08-07-19 @ 06:15)  BP(mean): --  RR: 16 (08-07-19 @ 00:00)  SpO2: 100% (08-07-19 @ 06:00)  Wt(kg): --    I and O's:    08-06 @ 07:01  -  08-07 @ 07:00  --------------------------------------------------------  IN: 250 mL / OUT: 120 mL / NET: 130 mL          PHYSICAL EXAM:    Constitutional: NAD;  cachetic   Neck:  No JVD  Respiratory: Course   Cardiovascular: S1 and S2  Gastrointestinal: BS+, soft, NT/ND  Extremities: +  peripheral edema  Neurological:unable   : +  Blackman  Skin: No rashes  Access: Not applicable    LABS:                        7.4    12.40 )-----------( 239      ( 06 Aug 2019 11:53 )             23.3     08-06    133<L>  |  91<L>  |  160<H>  ----------------------------<  123<H>  4.5   |  21<L>  |  4.13<H>    Ca    8.1<L>      06 Aug 2019 09:33            Urine Studies:          RADIOLOGY & ADDITIONAL STUDIES: NEPHROLOGY-[facility] [phone]  Covering for Medicine for today       Patient seen and examined in bed.  She was about the same         MEDICATIONS  (STANDING):  ALBUTerol/ipratropium for Nebulization 3 milliLiter(s) Nebulizer every 6 hours  apixaban 2.5 milliGRAM(s) Oral two times a day  artificial tears (preservative free) Ophthalmic Solution 1 Drop(s) Both EYES four times a day  BACItracin   Ointment 1 Application(s) Topical two times a day  chlorhexidine 2% Cloths 1 Application(s) Topical daily  clonazePAM  Tablet 1 milliGRAM(s) Oral at bedtime  Dakins Solution - 1/4 Strength 1 Application(s) Topical two times a day  dextrose 5%. 1000 milliLiter(s) (50 mL/Hr) IV Continuous <Continuous>  famotidine    Tablet 20 milliGRAM(s) Oral every 48 hours  ferrous    sulfate Liquid 300 milliGRAM(s) Enteral Tube daily  formoterol for nebulization 20 MICROGram(s) Nebulizer two times a day  gabapentin   Solution 300 milliGRAM(s) Oral daily  hydrocortisone 1% Cream 1 Application(s) Topical once  hydrocortisone 1% Cream 1 Application(s) Topical two times a day  HYDROmorphone  Injectable 0.2 milliGRAM(s) IV Push <User Schedule>  insulin lispro (HumaLOG) corrective regimen sliding scale   SubCutaneous every 6 hours  levothyroxine Injectable 60 MICROGram(s) IV Push at bedtime  Medical Marijuana Awake Oil 2 milliLiter(s),Medical Marijuana Awake Oil 2 milliLiter(s) 2 milliLiter(s) Enteral Tube <User Schedule>  Medical Marijuana Calm Oil 2 milliLiter(s),Medical [place]ju[place] Calm Oil 2 milliLiter(s) 2 milliLiter(s) Enteral Tube <User Schedule>  Medical Marijuana [place] Oil 2 milliLiter(s),Medical Marijuana [place] Oil 2 milliLiter(s) 2 milliLiter(s) Enteral Tube <User Schedule>  metoprolol tartrate 12.5 milliGRAM(s) Oral two times a day  multivitamin/minerals/iron Oral Solution (CENTRUM) 15 milliLiter(s) Oral daily  naloxone Injectable 0.1 milliGRAM(s) IV Push every 3 minutes  QUEtiapine 25 milliGRAM(s) Oral at bedtime  sevelamer carbonate Powder 800 milliGRAM(s) Oral three times a day with meals  sodium chloride 0.9%. 1000 milliLiter(s) (50 mL/Hr) IV Continuous <Continuous>  tiZANidine 4 milliGRAM(s) Oral <User Schedule>  zinc sulfate 220 milliGRAM(s) Oral daily      VITAL:  T(C): , Max: 37.6 (08-07-19 @ 06:00)  T(F): , Max: 99.7 (08-07-19 @ 06:00)  HR: 108 (08-07-19 @ 06:00)  BP: 92/48 (08-07-19 @ 06:15)  BP(mean): --  RR: 16 (08-07-19 @ 00:00)  SpO2: 100% (08-07-19 @ 06:00)  Wt(kg): --    I and O's:    08-06 @ 07:01  -  08-07 @ 07:00  --------------------------------------------------------  IN: 250 mL / OUT: 120 mL / NET: 130 mL          PHYSICAL EXAM:    Constitutional: NAD;  cachetic   Neck:  No JVD  Respiratory: Course   Cardiovascular: S1 and S2  Gastrointestinal: BS+, soft, NT/ND  Extremities: +  peripheral edema  Neurological:unable   : +  Blackman  Skin: No rashes  Access: Not applicable    LABS:                        7.4    12.40 )-----------( 239      ( 06 Aug 2019 11:53 )             23.3     08-06    133<L>  |  91<L>  |  160<H>  ----------------------------<  123<H>  4.5   |  21<L>  |  4.13<H>    Ca    8.1<L>      06 Aug 2019 09:33            Urine Studies:          RADIOLOGY & ADDITIONAL STUDIES:

## 2019-08-07 NOTE — PROGRESS NOTE ADULT - PROBLEM SELECTOR PLAN 7
and family have decided to keep patient in the hospital due to significant change in clinical status overnight. Family does not want pt to be uncomfortable and pass away in route to the house. They wish to keep pt as comfortable as possible. Hospice team made aware of above.   pt remains DNR/DNI.  50' were spent in ACP and completion of MOLST form.

## 2019-08-07 NOTE — PROGRESS NOTE ADULT - ATTENDING COMMENTS
Repeat exam of 68 yo  non ambulatory female with severe dementia and wounds  Status d/w    right leg wound with superificial necrotic tissue , no cellulitis  Consent for debridement d/w , as necrotic tissue precludes effective wound healing  Right lower leg medial wound excisionally  debrided to fascia using scalpel  No bleeding , well tolerated   Dressed with Honey   present throughout  remain available
Shane Rdz MD  Pager [phone]  After 5pm/weekends call [phone]
f/u exam of 68 yo female   Currently with TRANG, Vancomycin being held  Is at bedrest , contracted, PEG has been replaced, responding to  , but non verbal  Nasal O2 was in place  Incontinent for a large amount of semi formed stool which have soiled dressings  Perineal care provided   present  sacral wound is large, with significant drainage , not grossly purulent, no bone exposed, covered with granulation tissue  Adherent eschar  proximal to sacral wound located over  spine   Will dress sacral wound with Dakins and spine eschar with Cavilon  Status d/w 
Shane Rdz MD  Pager [phone]  After 5pm/weekends call [phone]
>60 minutes spent on total encounter and more then 50% of the visit was spent counseling and/or coordinating care by the attending physician
Randall Okeefe MD, FACP, FACG, AGAF  [facility]  [phone]     After hours and weekend coverage GI service : [phone]
Shane Rdz MD  Pager [phone]  After 5pm/weekends call [phone]
Shane Rdz MD  Pager [phone]  After 5pm/weekends call [phone]
Shane Rdz MD  Pager [phone]  After 5pm/weekends call [phone]
Shane Rdz MD  Pager [phone]  After 5pm/weekends call [phone]
Shane Rdz MD  Pager [phone]  After 5pm/weekends call [phone]
Shane Rdz MD  Pager [phone]  After 5pm/weekends call [phone]
Shane Rdz MD  Pager [phone]  After 5pm/weekends call [phone]
Shane Rdz MD  Pager [phone]  After 5pm/weekends call [phone]
Shane Rdz MD  Pager [phone]  After 5pm/weekends call [phone]
Shane Rdz MD  Pager [phone]  After 5pm/weekends call [phone]
Shane Rdz MD  Pager [phone]  After 5pm/weekends call [phone]
Shane Rdz MD  Pager [phone]  After 5pm/weekends call [phone]
Shane Rdz MD  Pager [phone]  After 5pm/weekends call [phone]
>60 minutes spent on total encounter and more then 50% of the visit was spent counseling and/or coordinating care by the attending physician
AGree with above management.
Agree with above mangement.
Randall Okeefe MD, FACP, FACG, AGAF  [facility]  [phone]     After hours and weekend coverage GI service : [phone]
Randall Okeefe MD, FACP, FACG, AGAF  [facility]  [phone]     After hours and weekend coverage GI service : [phone]
Randall Okeefe MD, FACP, FACG, AGAF  [facility]  [phone]     After hours and weekend coverage GI service : [phone]
Renal attd     seems more attentive to the idea that she is doing poorly and likely slowly dying  No HD  No IVF for now
Renal attd    -Again a solitary kidney and she is in ATN  THe JVD is still present and IV lasix given  -I have no other therapeutic intervantion x time at present
Renal attd    -She is not a candidate for hd  -I told the family that we were still trying but she is actively "dying".  I have spent time addressing their concerns   -Order CXR to assess volume status    >60 minutes spent on total encounter and more then 50% of the visit was spent counseling and/or coordinating care by the attending physician
Renal attd    She does not have JVD and now with a fever    -NS 50cc/hr for 10 hours and then reassess    No change in renal parameters    35 minutes spent on total encounter and more then 50% of the visit was spent counseling and/or coordinating care by the attending physician
Randall Okeefe MD, FACP, FACG, AGAF  [facility]  [phone]     After hours and weekend coverage GI service : [phone]
Agree with above. Seen and examined with residents on rounds. Poor prognosis for functional recovery with stage 4 decubiti, severe dementia, bedbound and unable to speak. Continue supportive care.
Renal attd    Unfortunately her kidney profile is not getting better.  She is too frail and cachetic to undergo HD and this would be an aggressive approach to a bed bound functional quadriplegic...I have told the family to get a second opinion for nephrology more to appease the family then to have any different insight into the case.  Next steps may include palliation    IV lasix today    >60 minutes spent on total encounter and more then 50% of the visit was spent counseling and/or coordinating care by the attending physician
1. Acute hypoxemic respiratory failure due to aspiration pneumonia. Continue Zosyn . Oxygenation improving. Taper off hi flow as tolerated.  2. Hypotension: resolved. Decreased  stress dose steroids to  25mg q 6 hrs.  3. Renal failure. ATN: creatinine rising. Give fluid bolus  to increase urine output.
Janeen Domínguez MD  [phone] (pager)  [phone] (office)
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  - Admit to ICU for close cardiopulmonary monitoring and neuro status evaluation   - Hypoxic Respiratory Failure on AFMO2 vs. BiPAP support   - Sever Sepsis and Shock starting Pressor to maintain MAP > 65 mmHg in HFrEF setting   - NPO except Medications via PEG in next 24 Hr and reassess   - Acute Renal Failure on Chronic Blackman complicated by Urosepsis   - Families at bedside requested full CODE but intubation as last resort     Critical care Time = 45 Min
Den Tiwari  Attending Physician   Division of Infectious Disease  Pager #[phone]  After 5pm/weekend or no response, call #[phone]
Overnight the patient's clinical status further decline and she now appears to be actively entering her dying process. Her  and daughter (Angie) recognized that transferring the patient home may represent the patient dying during transport and also representing issues with symptoms Rx. Her  decided for keeping the patient in the hospital for advanced symptoms Rx and EOL care. Due to hemodynamic instability  will keep the patient in 6 esther and re-address the possibility for PCU care were she to become more stable.     Her  indicated that at this point goals are towards preventing and treating any distressful symptoms. He asked for stopping medications that may be prolonging the patient illness process and medications that may not be changing her overall outcomes. He wanted to continue only treatments that are helping with symptoms Rx. He also asked for minimizing wound care if possible.     Emotional support was provided.     A booklet about EOL was provided.     16' were spent in ACP.
Den Tiwari  Attending Physician   Division of Infectious Disease  Pager #[phone]  After 5pm/weekend or no response, call #[phone]    I am away and will return 7/8. ID colleagues to cover #[phone].
Den Tiwari  Attending Physician   Division of Infectious Disease  Pager #[phone]  After 5pm/weekend or no response, call #[phone]
Patient with  End Stage Dementia (FAST 7D), DU with associated Osteo, Dysphagia (s/p PEG), Severe functional impairment, CHF (EF 26 % on 12/3/2018), and now with TRANG and up trending Creat.   D/W patient's  that verbalized understanding about his wife's advanced illnesses and very poor prognosis. He understands she may be approaching the end of her life and that she is not a candidate for HD. He shared his sadness and frustration with the patient's worsening conditions and lack of improvement on her TRANG; however, he indicated he is grateful the patient has outlive her prognosis and that it is reasonable at this time to focus on being sure the patient is comfortable. His hope is that the patient may be able to be DC home with hospice and a FM is planned for 8/6 at 10 am with his daughters and hospice care. Emotional support was provided. He and his daughter were reassures for the excellent care and dedication they have provided for the patient.   Will advised:   Decreased Dilaudid breakthrough dose to 0.3 mg q 4 PRN and adding Dilaudid 0.2 mg IV schedule (7 am, 1 pm, and 7 p m)  Will also advised to decrease Neurontin to 200 mg per day.

## 2019-08-07 NOTE — CHART NOTE - NSCHARTNOTEFT_GEN_A_CORE
Notified by RN for increased work of breathing. Patient seen and examined at bedside with presence of family. Patient noted to be in mild resp distress. No accessory muscle use. Patient is to go home with home hospice on 8/7. Family requests to focus on comfort care for now. Cachetic, +Crackle and coarse rhonchi bilaterally. +edema.      Stop PEG feeding for now as per family's request  Duoneb x 1   Dilaudid 0.3mg x 1  Robinul 0.2 mg IVP x 1   Ativan 0.5 mg IV x 1 PRN for agitation    Physician Assistant Follow Up Note  Patient noted to be breathing more comfortably s/p Dilaudid.   Will continue to monitor closely overnight and endorse to primary team in AM    Rosamaria Stiles PA-C Notified by RN for increased work of breathing. Patient seen and examined at bedside with presence of family. Patient noted to be in mild resp distress. No accessory muscle use. Patient is to go home with home hospice on 8/7. Family requests to focus on comfort care for now. Cachetic, +Crackle and coarse rhonchi bilaterally. +edema.      Stop PEG feeding for now as per family's request  Duoneb x 1   Dilaudid 0.3mg x 1  Robinul 0.2 mg IVP x 1   Ativan 0.5 mg IV x 1 PRN for agitation    Physician Assistant Follow Up Note  Patient noted to be breathing more comfortably s/p Dilaudid.   Will continue to monitor closely overnight and endorse to primary team in AM    Addendum  Notified by RN for temp of 100.7'F. Rectal Tylenol given. Will continue to monitor closely    Rosamaria Stiles PA-C

## 2019-08-07 NOTE — PROGRESS NOTE ADULT - PROBLEM SELECTOR PLAN 5
noted to have multiple pressure ulcers  -PPS < 20%   - Patient needs full nursing care, family only wants wound dressing changes if dressing is soiled or is not functioning appropriately. In an effort to make pt as comfortable as possible.   - c/w leslye

## 2019-08-07 NOTE — PROGRESS NOTE ADULT - ASSESSMENT
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, left knee fracture, DM, RLE DVT, p/w dislodged PEG tube s/p replacement of PEG by IR c/b TRANG   - TRANG: multifactorial with solitary kidney - likely ATN - non-oliguric -    - metabolic acidosis with high anion gap  - hyponatremia:    - hypervolemic on exam  - anemia: of chronic dz - hgb trending down   - hyperphosphatemia: due to CKD/TRANG - on binders    RECOMMEND:  - palliative care input appreciated   - monitor strict IOs  - continue TF with nepro as able via PEG per GI;  On discharge will dc the Se[facility]   - maintain chronic gavin catheter   - dose medication for a GFR ~ 10-15 mL/min  -Home with home hospice     [facility]  [phone]

## 2019-08-07 NOTE — PROGRESS NOTE ADULT - PROBLEM SELECTOR PLAN 1
- c/w Dilaudid 0.3 mg Q2h PRN for break through pain and Dilaudid 0.2 mg Q6h ATC.    - On medical Marijuana   - c/w gabapentin but consider decreasing it to 200 mg q daily due to eGFR   - Continue Tylenol   - continue using PAINAD score for assessment - c/w Dilaudid 0.3 mg Q2h PRN for break through pain and Dilaudid 0.2 mg Q6h ATC.    - On medical Marijuana   - c/w gabapentin but will decrease it to 200 mg q daily due to eGFR   - Continue Tylenol   - continue using PAINAD score for assessment

## 2019-08-07 NOTE — PROGRESS NOTE ADULT - PROBLEM SELECTOR PLAN 3
c/w Seroquel 25 mg HS  - Agitation 0.2mg q2h PRN, please call palliate more than 3 PRN meds are given in < 24hrs

## 2019-08-07 NOTE — PROGRESS NOTE ADULT - SUBJECTIVE AND OBJECTIVE BOX
SUBJECTIVE AND OBJECTIVE: Pt has increased work of breathing this morning, but is comfortable.     INTERVAL HPI/OVERNIGHT EVENTS:  overnight pt had developed  increased work of breathing, with mild resp distress. She was given Duoneb x 1   Dilaudid 0.3mg x 1, Robinul 0.2 mg IVP x 1 and Ativan 0.5 mg IV x 1 PRN for agitation which help make her comfortable. PEG feeding are currently on hold.       DNR on chart: Yes    Allergies    ASA; dye contrast (Anaphylaxis)  aspirin (Short breath)  divalproex sodium (Other (Unknown))  Flowers and Plants (Short breath)  Haldol (Other (Unknown))  penicillin (Short breath; Rash)  sulfa drugs (Short breath; Rash)  vancomycin (Rash; Urticaria; Hives)  Xanax (Other (Unknown))    Intolerances    MEDICATIONS  (STANDING):  artificial tears (preservative free) Ophthalmic Solution 1 Drop(s) Both EYES four times a day  BACItracin   Ointment 1 Application(s) Topical two times a day  chlorhexidine 2% Cloths 1 Application(s) Topical daily  clonazePAM  Tablet 1 milliGRAM(s) Oral at bedtime  [place]akins Solution - 1/4 Strength 1 Application(s) Topical two times a day  dextrose 5%. 1000 milliLiter(s) (50 mL/Hr) IV Continuous <Continuous>  gabapentin   Solution 200 milliGRAM(s) Enteral Tube daily  hydrocortisone 1% Cream 1 Application(s) Topical once  hydrocortisone 1% Cream 1 Application(s) Topical two times a day  HYDROmorphone  Injectable 0.2 milliGRAM(s) IV Push every 6 hours  Medical Marijuana Awake Oil 2 milliLiter(s),Medical Marijuana Awake Oil 2 milliLiter(s) 2 milliLiter(s) Enteral Tube <User Schedule>  Medical Marijuana Calm Oil 2 milliLiter(s),Medical [place] Calm Oil 2 milliLiter(s) 2 milliLiter(s) Enteral Tube <User Schedule>  Medical Marijuana [place] Oil 2 milliLiter(s),Medical Marijuana [place] Oil 2 milliLiter(s) 2 milliLiter(s) Enteral Tube <User Schedule>  QUEtiapine 25 milliGRAM(s) Oral at bedtime  sodium chloride 0.9%. 1000 milliLiter(s) (50 mL/Hr) IV Continuous <Continuous>  tiZANidine 4 milliGRAM(s) Oral <User Schedule>    MEDICATIONS  (PRN):  acetaminophen  Suppository .. 650 milliGRAM(s) Rectal every 6 hours PRN Temp greater or equal to 38C (100.4F), Mild Pain (1 - 3)  carboxymethylcellulose 0.5% (Preservative-Free) Ophthalmic Solution 1 Drop(s) Both EYES three times a day PRN dry eyes  glycopyrrolate Injectable 0.4 milliGRAM(s) IV Push every 6 hours PRN secretions  HYDROmorphone  Injectable 0.3 milliGRAM(s) IV Push every 2 hours PRN Severe Pain (7 - 10)  LORazepam   Injectable 0.2 milliGRAM(s) IV Push every 2 hours PRN agitation or respiratory distress that is persistent after back to back opioids are given.  nystatin Powder 1 Application(s) Topical three times a day PRN under breasts      ITEMS UNCHECKED ARE NOT PRESENT    PRESENT SYMPTOMS: [ x]Unable to obtain due to poor mentation   Source if other than patient:  [x ]Family   [ x]Team     Pain:  [x ] yes [ ] no    Dyspnea:                           [x ]Mild [ ]Moderate [ ]Severe  Anxiety:                             [ ]Mild [ ]Moderate [ ]Severe  Fatigue:                             [ ]Mild [ ]Moderate [ ]Severe  Nausea:                             [ ]Mild [ ]Moderate [ ]Severe  Loss of appetite:              [ ]Mild [ ]Moderate [ ]Severe  Constipation:                    [ ]Mild [ ]Moderate [ ]Severe    PAIN AD Score:	  1    Other Symptoms:  [ ]All other review of systems negative     Karnofsky Performance Score/Palliative Performance Status Version 2:   10      %    http://[email or web address]/files/[email or web address]_performance_scale_[email or web address].pdf  [email or web address].pdf  PHYSICAL EXAM:  Vital Signs Last 24 Hrs  T(C): 37.6 (07 Aug 2019 06:00), Max: 37.6 (07 Aug 2019 06:00)  T(F): 99.7 (07 Aug 2019 06:00), Max: 99.7 (07 Aug 2019 06:00)  HR: 108 (07 Aug 2019 06:00) (108 - 125)  BP: 92/48 (07 Aug 2019 06:15) (80/40 - 129/67)  BP(mean): --  RR: 16 (07 Aug 2019 00:00) (16 - 17)  SpO2: 100% (07 Aug 2019 06:00) (100% - 100%) I&O's Summary    06 Aug 2019 07:01  -  07 Aug 2019 07:00  --------------------------------------------------------  IN: 250 mL / OUT: 120 mL / NET: 130 mL     GENERAL:  [ ]Alert  [ ]Oriented x   [ ]Lethargic  [ x]Cachexia  [ ]Unarousable  [ ]Verbal  [x ]Non-Verbal  Behavioral:   [ ] Anxiety  [ ] Delirium [ ] Agitation [ ] Other  HEENT:  x[ ]Normal   [ ]Dry mouth   [ ]ET Tube/Trach  [ ]Oral lesions  PULMONARY:   [ ]Clear [ ]Tachypnea  [x ]Audible upper airway secretions   [ ]Rhonchi        [ ]Right [ ]Left [ ]Bilateral  [x ]Crackles        [ ]Right [ ]Left [x ]Bilateral  [ ]Wheezing     [ ]Right [ ]Left [ ]Bilateral  CARDIOVASCULAR:    [x ]Regular [ ]Irregular [ ]Tachy  [ ]Sourav [ ]Murmur [ ]Other  GASTROINTESTINAL:  [x ]Soft  [ ]Distended   [ ]+BS  [x ]Non tender [ ]Tender  [x ]PEG [ ]OGT/ NGT   Last BM:    GENITOURINARY:  [ ]Normal [ ] Incontinent   [ ]Oliguria/Anuria   [ x]Blackman  MUSCULOSKELETAL:   [ ]Normal   [ ]Weakness  [x ]Bed/Wheelchair bound [ ]Edema  NEUROLOGIC:   [ ]No focal deficits  [x ] Cognitive impairment  [ x] Dysphagia [ ]Dysarthria [ ] Paresis [ ]Other   SKIN:   [ ]Normal   [ x]Pressure ulcer(s)  [x ]Rash    CRITICAL CARE:  [ ] Shock Present  [ ]Septic [ ]Cardiogenic [ ]Neurologic [ ]Hypovolemic  [ ]  Vasopressors [ ]  Inotropes  [ ] Respiratory failure present [ ] Mechanical Ventilation [ ] Non-invasive ventilatory support [ ] High-Flow  [ ] Acute  [ ] Chronic [ ] Hypoxic  [ ] Hypercarbic [ ] Other  [ x] Other organ failure     LABS:                        7.4    12.40 )-----------( 239      ( 06 Aug 2019 11:53 )             23.3   08-06    133<L>  |  91<L>  |  160<H>  ----------------------------<  123<H>  4.5   |  21<L>  |  4.13<H>    Ca    8.1<L>      06 Aug 2019 09:33          RADIOLOGY & ADDITIONAL STUDIES:    Protein Calorie Malnutrition Present: [x ] yes [ ] no  [x ] PPSV2 < or = 30%  [ ] significant weight loss [ ] poor nutritional intake [ ] anasarca [ ] catabolic state Albumin, Serum: 2.5 g/dL (08-03-19 @ 13:47)  Artificial Nutrition [ ]     REFERRALS:   [x ]Chaplaincy  [x ] Hospice  [ ]Child Life  [ ]Social Work  [ ]Case management [ ]Holistic Therapy     Goals of Care Document:  DEE Kilgore (08-06-19 @ 14:52)  Goals of Care Conversation:   Participants:  · Spouse  Caleb  · Child(jorge)  Tere and Angie  · Provider  Dr Kilgore, Dr Elizabeth Ferrera.  · Nurse  Yanira Skelton (Hospice Nurse Liaison)    Advance Directives:  · Does patient have Advance Directive  Yes  · Indicate Type  Health Care Proxy (HCP)  · Agent's Name  Caleb Colon (spouse)  · Phone #  [phone]  · If HCP is not on chart, identify the persons name and phone number contacted to bring in document  Caleb  · Are any of the items on the chart  No  · Does Patient Have a Surrogate  Yes  · Surrogate's Name   as above.  · Caregiver:  yes  · Name   and children    Conversation Discussion:  · Conversation  Diagnosis; Prognosis; MOLST Discussed; Treatment Options; Hospice Referral  · Conversation Details  Patient's family was able to give verbalized understanding about her advanced illness and very poor prognosis ( likely hours to days). Her  indicated that at this point, the patient's dignity and comfort are of upmost importance. His family also indicated the patient may not have wanted heroic measures at this point and therefore a MOLST form was completed with DNR/I. We also discussed about tube feeds and IV hydration. For now, her surrogate would like to continue tube feeds and IV hydration; however, he understand that these Rxs may cause adverse effects (fluid overload) and if so he will be open to holding or DC them.     The patient' family had question about clinical changes before dying and information about it was given     50' were spent in ACP.      Electronic Signatures:  Alphonso Kilgore)  (Signed 06-Aug-2019 15:02)  	Authored: Goals of Care Conversation      Last Updated: 06-Aug-2019 15:02 by Alphonso Kilgore)      Care Coordination Assessment [C. Provider] (06-25-19 @ 09:39) SUBJECTIVE AND OBJECTIVE: Pt has increased work of breathing this morning, but is comfortable.     INTERVAL HPI/OVERNIGHT EVENTS:  overnight pt had developed  increased work of breathing, with mild resp distress. She was given Duoneb x 1   Dilaudid 0.3mg x 1, Robinul 0.2 mg IVP x 1 and Ativan 0.5 mg IV x 1 PRN for agitation which help make her comfortable. PEG feeding are currently on hold.       DNR on chart: Yes    Allergies    ASA; dye contrast (Anaphylaxis)  aspirin (Short breath)  divalproex sodium (Other (Unknown))  Flowers and Plants (Short breath)  Haldol (Other (Unknown))  penicillin (Short breath; Rash)  sulfa drugs (Short breath; Rash)  vancomycin (Rash; Urticaria; Hives)  Xanax (Other (Unknown))    Intolerances    MEDICATIONS  (STANDING):  artificial tears (preservative free) Ophthalmic Solution 1 Drop(s) Both EYES four times a day  BACItracin   Ointment 1 Application(s) Topical two times a day  chlorhexidine 2% Cloths 1 Application(s) Topical daily  clonazePAM  Tablet 1 milliGRAM(s) Oral at bedtime  Dakins Solution - 1/4 Strength 1 Application(s) Topical two times a day  dextrose 5%. 1000 milliLiter(s) (50 mL/Hr) IV Continuous <Continuous>  gabapentin   Solution 200 milliGRAM(s) Enteral Tube daily  hydrocortisone 1% Cream 1 Application(s) Topical once  hydrocortisone 1% Cream 1 Application(s) Topical two times a day  HYDROmorphone  Injectable 0.2 milliGRAM(s) IV Push every 6 hours  Medical Marijuana Awake Oil 2 milliLiter(s),Medical Marijuana Awake Oil 2 milliLiter(s) 2 milliLiter(s) Enteral Tube <User Schedule>  Medical Marijuana Calm Oil 2 milliLiter(s),Medical Marinjuana Calm Oil 2 milliLiter(s) 2 milliLiter(s) Enteral Tube <User Schedule>  Medical Marijuana [place] Oil 2 milliLiter(s),Medical Marijuana [place] Oil 2 milliLiter(s) 2 milliLiter(s) Enteral Tube <User Schedule>  QUEtiapine 25 milliGRAM(s) Oral at bedtime  sodium chloride 0.9%. 1000 milliLiter(s) (50 mL/Hr) IV Continuous <Continuous>  tiZANidine 4 milliGRAM(s) Oral <User Schedule>    MEDICATIONS  (PRN):  acetaminophen  Suppository .. 650 milliGRAM(s) Rectal every 6 hours PRN Temp greater or equal to 38C (100.4F), Mild Pain (1 - 3)  carboxymethylcellulose 0.5% (Preservative-Free) Ophthalmic Solution 1 Drop(s) Both EYES three times a day PRN dry eyes  glycopyrrolate Injectable 0.4 milliGRAM(s) IV Push every 6 hours PRN secretions  HYDROmorphone  Injectable 0.3 milliGRAM(s) IV Push every 2 hours PRN Severe Pain (7 - 10)  LORazepam   Injectable 0.2 milliGRAM(s) IV Push every 2 hours PRN agitation or respiratory distress that is persistent after back to back opioids are given.  nystatin Powder 1 Application(s) Topical three times a day PRN under breasts      ITEMS UNCHECKED ARE NOT PRESENT    PRESENT SYMPTOMS: [ x]Unable to obtain due to poor mentation   Source if other than patient:  [x ]Family   [ x]Team     Pain:  [x ] yes [ ] no    Dyspnea:                           [x ]Mild [ ]Moderate [ ]Severe  Anxiety:                             [ ]Mild [ ]Moderate [ ]Severe  Fatigue:                             [ ]Mild [ ]Moderate [ ]Severe  Nausea:                             [ ]Mild [ ]Moderate [ ]Severe  Loss of appetite:              [ ]Mild [ ]Moderate [ ]Severe  Constipation:                    [ ]Mild [ ]Moderate [ ]Severe    PAIN AD Score:	3    Other Symptoms:  [ ]All other review of systems negative     Karnofsky Performance Score/Palliative Performance Status Version 2:   10      %    [email or web address]/files/[email or web address]_performance_scale_[email or web address]  [email or web address]  PHYSICAL EXAM:  Vital Signs Last 24 Hrs  T(C): 37.6 (07 Aug 2019 06:00), Max: 37.6 (07 Aug 2019 06:00)  T(F): 99.7 (07 Aug 2019 06:00), Max: 99.7 (07 Aug 2019 06:00)  HR: 108 (07 Aug 2019 06:00) (108 - 125)  BP: 92/48 (07 Aug 2019 06:15) (80/40 - 129/67)  BP(mean): --  RR: 16 (07 Aug 2019 00:00) (16 - 17)  SpO2: 100% (07 Aug 2019 06:00) (100% - 100%) I&O's Summary    06 Aug 2019 07:01  -  07 Aug 2019 07:00  --------------------------------------------------------  IN: 250 mL / OUT: 120 mL / NET: 130 mL     GENERAL:  [ ]Alert  [ ]Oriented x   [ ]Lethargic  [ x]Cachexia  [ ]Unarousable  [ ]Verbal  [x ]Non-Verbal [x] Ill appearing female. Distress due to mild labored breathing.   Behavioral:   [ ] Anxiety  [ ] Delirium [ ] Agitation [ ] Other  HEENT:  [x ]Normal   [ ]Dry mouth   [ ]ET Tube/Trach  [ ]Oral lesions  PULMONARY:   [ ]Clear [ ]Tachypnea  [x ]Audible upper airway secretions   [ ]Rhonchi        [ ]Right [ ]Left [ ]Bilateral  [x ]Crackles        [ ]Right [ ]Left [x ]Bilateral  [ ]Wheezing     [ ]Right [ ]Left [ ]Bilateral  CARDIOVASCULAR:    [x ]Regular [ ]Irregular [ ]Tachy  [ ]Sourav [ ]Murmur [ ]Other  GASTROINTESTINAL:  [x ]Soft  [ ]Distended   [ ]+BS  [x ]Non tender [ ]Tender  [x ]PEG [ ]OGT/ NGT   Last BM: 8/5   GENITOURINARY:  [ ]Normal [ ] Incontinent   [ ]Oliguria/Anuria   [ x]Blackman  MUSCULOSKELETAL:   [ ]Normal   [ ]Weakness  [x ]Bed/Wheelchair bound [ ]Edema [x] LE contractures.   NEUROLOGIC:   [ ]No focal deficits  [x ] Cognitive impairment  [ x] Dysphagia [ ]Dysarthria [ ] Paresis [ ]Other   SKIN:   [ ]Normal   [ x]Pressure ulcer(s)  [x ]Rash    CRITICAL CARE:  [ ] Shock Present  [ ]Septic [ ]Cardiogenic [ ]Neurologic [ ]Hypovolemic  [ ]  Vasopressors [ ]  Inotropes  [ ] Respiratory failure present [ ] Mechanical Ventilation [ ] Non-invasive ventilatory support [ ] High-Flow  [ ] Acute  [ ] Chronic [ ] Hypoxic  [ ] Hypercarbic [ ] Other  [ x] Other organ failure     LABS:                        7.4    12.40 )-----------( 239      ( 06 Aug 2019 11:53 )             23.3   08-06    133<L>  |  91<L>  |  160<H>  ----------------------------<  123<H>  4.5   |  21<L>  |  4.13<H>    Ca    8.1<L>      06 Aug 2019 09:33          RADIOLOGY & ADDITIONAL STUDIES:  Reviewed.   Protein Calorie Malnutrition Present: [x ] yes [ ] no  [x ] PPSV2 < or = 30%  [ ] significant weight loss [ ] poor nutritional intake [ ] anasarca [ ] catabolic state Albumin, Serum: 2.5 g/dL (08-03-19 @ 13:47)  Artificial Nutrition [ ]     REFERRALS:   [x ]Chaplaincy  [x ] Hospice  [ ]Child Life  [ ]Social Work  [ ]Case management [ ]Holistic Therapy     Goals of Care Document:  DEE Kilgore (08-06-19 @ 14:52)  Goals of Care Conversation:   Participants:  · Spouse  Caleb  · Child(jorge)  Tere and Angie  · Provider  Dr Kilgore, Dr Elizabeth Ferrera.  · Nurse  Yanira Skelton (Hospice Nurse Liaison)    Advance Directives:  · Does patient have Advance Directive  Yes  · Indicate Type  Health Care Proxy (HCP)  · Agent's Name  Caleb Colon (spouse)  · Phone #  [phone]  · If HCP is not on chart, identify the persons name and phone number contacted to bring in document  Caleb  · Are any of the items on the chart  No  · Does Patient Have a Surrogate  Yes  · Surrogate's Name   as above.  · Caregiver:  yes  · Name   and children    Conversation Discussion:  · Conversation  Diagnosis; Prognosis; MOLST Discussed; Treatment Options; Hospice Referral  · Conversation Details  Patient's family was able to give verbalized understanding about her advanced illness and very poor prognosis ( likely hours to days). Her  indicated that at this point, the patient's dignity and comfort are of upmost importance. His family also indicated the patient may not have wanted heroic measures at this point and therefore a MOLST form was completed with DNR/I. We also discussed about tube feeds and IV hydration. For now, her surrogate would like to continue tube feeds and IV hydration; however, he understand that these Rxs may cause adverse effects (fluid overload) and if so he will be open to holding or DC them.     The patient' family had question about clinical changes before dying and information about it was given     50' were spent in ACP.      Electronic Signatures:  Alphonso Kilgore)  (Signed 06-Aug-2019 15:02)  	Authored: Goals of Care Conversation      Last Updated: 06-Aug-2019 15:02 by Alphonso Kilgore)      Care Coordination Assessment [C. Provider] (06-25-19 @ 09:39)

## 2019-08-07 NOTE — CHART NOTE - NSCHARTNOTEFT_GEN_A_CORE
Prior events noted  Pt now comfort care    Discussed with all family at bedside and Medical team  Emotional support given to family    Will Sign off care. Please recall prn for GI concerns.  Thank You.    Adam Miller PA-C    [facility]  [phone]  After hours and weekend coverage [phone]

## 2019-08-08 VITALS — TEMPERATURE: 100 F

## 2019-08-08 PROCEDURE — C1769: CPT

## 2019-08-08 PROCEDURE — 94640 AIRWAY INHALATION TREATMENT: CPT

## 2019-08-08 PROCEDURE — 87633 RESP VIRUS 12-25 TARGETS: CPT

## 2019-08-08 PROCEDURE — 85730 THROMBOPLASTIN TIME PARTIAL: CPT

## 2019-08-08 PROCEDURE — 84300 ASSAY OF URINE SODIUM: CPT

## 2019-08-08 PROCEDURE — 82550 ASSAY OF CK (CPK): CPT

## 2019-08-08 PROCEDURE — 99285 EMERGENCY DEPT VISIT HI MDM: CPT

## 2019-08-08 PROCEDURE — 36569 INSJ PICC 5 YR+ W/O IMAGING: CPT

## 2019-08-08 PROCEDURE — 74176 CT ABD & PELVIS W/O CONTRAST: CPT

## 2019-08-08 PROCEDURE — 99233 SBSQ HOSP IP/OBS HIGH 50: CPT | Mod: GC

## 2019-08-08 PROCEDURE — 85014 HEMATOCRIT: CPT

## 2019-08-08 PROCEDURE — 82436 ASSAY OF URINE CHLORIDE: CPT

## 2019-08-08 PROCEDURE — 85027 COMPLETE CBC AUTOMATED: CPT

## 2019-08-08 PROCEDURE — 86923 COMPATIBILITY TEST ELECTRIC: CPT

## 2019-08-08 PROCEDURE — 36430 TRANSFUSION BLD/BLD COMPNT: CPT

## 2019-08-08 PROCEDURE — 88304 TISSUE EXAM BY PATHOLOGIST: CPT

## 2019-08-08 PROCEDURE — 84295 ASSAY OF SERUM SODIUM: CPT

## 2019-08-08 PROCEDURE — 86901 BLOOD TYPING SEROLOGIC RH(D): CPT

## 2019-08-08 PROCEDURE — 85652 RBC SED RATE AUTOMATED: CPT

## 2019-08-08 PROCEDURE — 93005 ELECTROCARDIOGRAM TRACING: CPT

## 2019-08-08 PROCEDURE — 82570 ASSAY OF URINE CREATININE: CPT

## 2019-08-08 PROCEDURE — 82330 ASSAY OF CALCIUM: CPT

## 2019-08-08 PROCEDURE — P9011: CPT

## 2019-08-08 PROCEDURE — 83605 ASSAY OF LACTIC ACID: CPT

## 2019-08-08 PROCEDURE — 86140 C-REACTIVE PROTEIN: CPT

## 2019-08-08 PROCEDURE — 73560 X-RAY EXAM OF KNEE 1 OR 2: CPT

## 2019-08-08 PROCEDURE — 84100 ASSAY OF PHOSPHORUS: CPT

## 2019-08-08 PROCEDURE — P9040: CPT

## 2019-08-08 PROCEDURE — 82803 BLOOD GASES ANY COMBINATION: CPT

## 2019-08-08 PROCEDURE — 84133 ASSAY OF URINE POTASSIUM: CPT

## 2019-08-08 PROCEDURE — 87581 M.PNEUMON DNA AMP PROBE: CPT

## 2019-08-08 PROCEDURE — C1751: CPT

## 2019-08-08 PROCEDURE — 84132 ASSAY OF SERUM POTASSIUM: CPT

## 2019-08-08 PROCEDURE — 84156 ASSAY OF PROTEIN URINE: CPT

## 2019-08-08 PROCEDURE — 87040 BLOOD CULTURE FOR BACTERIA: CPT

## 2019-08-08 PROCEDURE — 83930 ASSAY OF BLOOD OSMOLALITY: CPT

## 2019-08-08 PROCEDURE — 49450 REPLACE G/C TUBE PERC: CPT

## 2019-08-08 PROCEDURE — 87486 CHLMYD PNEUM DNA AMP PROBE: CPT

## 2019-08-08 PROCEDURE — 83735 ASSAY OF MAGNESIUM: CPT

## 2019-08-08 PROCEDURE — 76775 US EXAM ABDO BACK WALL LIM: CPT

## 2019-08-08 PROCEDURE — 80202 ASSAY OF VANCOMYCIN: CPT

## 2019-08-08 PROCEDURE — 81001 URINALYSIS AUTO W/SCOPE: CPT

## 2019-08-08 PROCEDURE — 87086 URINE CULTURE/COLONY COUNT: CPT

## 2019-08-08 PROCEDURE — 86850 RBC ANTIBODY SCREEN: CPT

## 2019-08-08 PROCEDURE — 94664 DEMO&/EVAL PT USE INHALER: CPT

## 2019-08-08 PROCEDURE — 87449 NOS EACH ORGANISM AG IA: CPT

## 2019-08-08 PROCEDURE — 80053 COMPREHEN METABOLIC PANEL: CPT

## 2019-08-08 PROCEDURE — 71045 X-RAY EXAM CHEST 1 VIEW: CPT

## 2019-08-08 PROCEDURE — P9016: CPT

## 2019-08-08 PROCEDURE — 36600 WITHDRAWAL OF ARTERIAL BLOOD: CPT

## 2019-08-08 PROCEDURE — 82272 OCCULT BLD FECES 1-3 TESTS: CPT

## 2019-08-08 PROCEDURE — 83880 ASSAY OF NATRIURETIC PEPTIDE: CPT

## 2019-08-08 PROCEDURE — 82962 GLUCOSE BLOOD TEST: CPT

## 2019-08-08 PROCEDURE — 82947 ASSAY GLUCOSE BLOOD QUANT: CPT

## 2019-08-08 PROCEDURE — 84484 ASSAY OF TROPONIN QUANT: CPT

## 2019-08-08 PROCEDURE — 82553 CREATINE MB FRACTION: CPT

## 2019-08-08 PROCEDURE — 86900 BLOOD TYPING SEROLOGIC ABO: CPT

## 2019-08-08 PROCEDURE — 87798 DETECT AGENT NOS DNA AMP: CPT

## 2019-08-08 PROCEDURE — 82435 ASSAY OF BLOOD CHLORIDE: CPT

## 2019-08-08 PROCEDURE — 85610 PROTHROMBIN TIME: CPT

## 2019-08-08 PROCEDURE — 94660 CPAP INITIATION&MGMT: CPT

## 2019-08-08 PROCEDURE — P9045: CPT

## 2019-08-08 PROCEDURE — 83935 ASSAY OF URINE OSMOLALITY: CPT

## 2019-08-08 PROCEDURE — 84443 ASSAY THYROID STIM HORMONE: CPT

## 2019-08-08 PROCEDURE — 80048 BASIC METABOLIC PNL TOTAL CA: CPT

## 2019-08-08 PROCEDURE — 71250 CT THORAX DX C-: CPT

## 2019-08-08 RX ORDER — HYDROMORPHONE HYDROCHLORIDE 2 MG/ML
0.5 INJECTION INTRAMUSCULAR; INTRAVENOUS; SUBCUTANEOUS
Refills: 0 | Status: DISCONTINUED | OUTPATIENT
Start: 2019-08-08 | End: 2019-08-09

## 2019-08-08 RX ORDER — SCOPALAMINE 1 MG/3D
1 PATCH, EXTENDED RELEASE TRANSDERMAL
Refills: 0 | Status: DISCONTINUED | OUTPATIENT
Start: 2019-08-08 | End: 2019-08-09

## 2019-08-08 RX ORDER — ROBINUL 0.2 MG/ML
0.4 INJECTION INTRAMUSCULAR; INTRAVENOUS ONCE
Refills: 0 | Status: COMPLETED | OUTPATIENT
Start: 2019-08-08 | End: 2019-08-08

## 2019-08-08 RX ORDER — ACETAMINOPHEN 500 MG
500 TABLET ORAL ONCE
Refills: 0 | Status: COMPLETED | OUTPATIENT
Start: 2019-08-08 | End: 2019-08-08

## 2019-08-08 RX ORDER — ACETAMINOPHEN 500 MG
1000 TABLET ORAL ONCE
Refills: 0 | Status: COMPLETED | OUTPATIENT
Start: 2019-08-08 | End: 2019-08-08

## 2019-08-08 RX ORDER — HYDROMORPHONE HYDROCHLORIDE 2 MG/ML
0.2 INJECTION INTRAMUSCULAR; INTRAVENOUS; SUBCUTANEOUS
Qty: 100 | Refills: 0 | Status: DISCONTINUED | OUTPATIENT
Start: 2019-08-08 | End: 2019-08-09

## 2019-08-08 RX ORDER — ACETAMINOPHEN 500 MG
1000 TABLET ORAL ONCE
Refills: 0 | Status: DISCONTINUED | OUTPATIENT
Start: 2019-08-08 | End: 2019-08-09

## 2019-08-08 RX ORDER — ROBINUL 0.2 MG/ML
0.4 INJECTION INTRAMUSCULAR; INTRAVENOUS EVERY 6 HOURS
Refills: 0 | Status: DISCONTINUED | OUTPATIENT
Start: 2019-08-08 | End: 2019-08-09

## 2019-08-08 RX ADMIN — HYDROMORPHONE HYDROCHLORIDE 0.2 MILLIGRAM(S): 2 INJECTION INTRAMUSCULAR; INTRAVENOUS; SUBCUTANEOUS at 01:10

## 2019-08-08 RX ADMIN — HYDROMORPHONE HYDROCHLORIDE 0.2 MILLIGRAM(S): 2 INJECTION INTRAMUSCULAR; INTRAVENOUS; SUBCUTANEOUS at 00:40

## 2019-08-08 RX ADMIN — HYDROMORPHONE HYDROCHLORIDE 0.5 MILLIGRAM(S): 2 INJECTION INTRAMUSCULAR; INTRAVENOUS; SUBCUTANEOUS at 20:03

## 2019-08-08 RX ADMIN — Medication 0.2 MILLIGRAM(S): at 08:37

## 2019-08-08 RX ADMIN — Medication 0.2 MILLIGRAM(S): at 12:15

## 2019-08-08 RX ADMIN — Medication 0.2 MILLIGRAM(S): at 17:59

## 2019-08-08 RX ADMIN — Medication 400 MILLIGRAM(S): at 15:39

## 2019-08-08 RX ADMIN — HYDROMORPHONE HYDROCHLORIDE 0.5 MILLIGRAM(S): 2 INJECTION INTRAMUSCULAR; INTRAVENOUS; SUBCUTANEOUS at 17:50

## 2019-08-08 RX ADMIN — HYDROMORPHONE HYDROCHLORIDE 0.2 MG/HR: 2 INJECTION INTRAMUSCULAR; INTRAVENOUS; SUBCUTANEOUS at 10:56

## 2019-08-08 RX ADMIN — HYDROMORPHONE HYDROCHLORIDE 0.2 MG/HR: 2 INJECTION INTRAMUSCULAR; INTRAVENOUS; SUBCUTANEOUS at 12:17

## 2019-08-08 RX ADMIN — HYDROMORPHONE HYDROCHLORIDE 0.5 MILLIGRAM(S): 2 INJECTION INTRAMUSCULAR; INTRAVENOUS; SUBCUTANEOUS at 23:50

## 2019-08-08 RX ADMIN — ROBINUL 0.4 MILLIGRAM(S): 0.2 INJECTION INTRAMUSCULAR; INTRAVENOUS at 17:59

## 2019-08-08 RX ADMIN — HYDROMORPHONE HYDROCHLORIDE 0.2 MILLIGRAM(S): 2 INJECTION INTRAMUSCULAR; INTRAVENOUS; SUBCUTANEOUS at 06:36

## 2019-08-08 RX ADMIN — HYDROMORPHONE HYDROCHLORIDE 0.5 MILLIGRAM(S): 2 INJECTION INTRAMUSCULAR; INTRAVENOUS; SUBCUTANEOUS at 15:40

## 2019-08-08 RX ADMIN — HYDROMORPHONE HYDROCHLORIDE 0.3 MILLIGRAM(S): 2 INJECTION INTRAMUSCULAR; INTRAVENOUS; SUBCUTANEOUS at 02:45

## 2019-08-08 RX ADMIN — ROBINUL 0.4 MILLIGRAM(S): 0.2 INJECTION INTRAMUSCULAR; INTRAVENOUS at 12:14

## 2019-08-08 RX ADMIN — Medication 1000 MILLIGRAM(S): at 12:17

## 2019-08-08 RX ADMIN — HYDROMORPHONE HYDROCHLORIDE 0.2 MILLIGRAM(S): 2 INJECTION INTRAMUSCULAR; INTRAVENOUS; SUBCUTANEOUS at 08:20

## 2019-08-08 RX ADMIN — Medication 0.2 MILLIGRAM(S): at 23:22

## 2019-08-08 RX ADMIN — SCOPALAMINE 1 PATCH: 1 PATCH, EXTENDED RELEASE TRANSDERMAL at 12:16

## 2019-08-08 RX ADMIN — ROBINUL 0.4 MILLIGRAM(S): 0.2 INJECTION INTRAMUSCULAR; INTRAVENOUS at 23:41

## 2019-08-08 RX ADMIN — HYDROMORPHONE HYDROCHLORIDE 0.3 MILLIGRAM(S): 2 INJECTION INTRAMUSCULAR; INTRAVENOUS; SUBCUTANEOUS at 13:46

## 2019-08-08 RX ADMIN — HYDROMORPHONE HYDROCHLORIDE 0.3 MILLIGRAM(S): 2 INJECTION INTRAMUSCULAR; INTRAVENOUS; SUBCUTANEOUS at 14:38

## 2019-08-08 RX ADMIN — Medication 400 MILLIGRAM(S): at 10:56

## 2019-08-08 RX ADMIN — HYDROMORPHONE HYDROCHLORIDE 0.5 MILLIGRAM(S): 2 INJECTION INTRAMUSCULAR; INTRAVENOUS; SUBCUTANEOUS at 23:20

## 2019-08-08 RX ADMIN — HYDROMORPHONE HYDROCHLORIDE 0.3 MILLIGRAM(S): 2 INJECTION INTRAMUSCULAR; INTRAVENOUS; SUBCUTANEOUS at 04:40

## 2019-08-08 RX ADMIN — Medication 0.2 MILLIGRAM(S): at 05:22

## 2019-08-08 RX ADMIN — Medication 200 MILLIGRAM(S): at 06:00

## 2019-08-08 RX ADMIN — HYDROMORPHONE HYDROCHLORIDE 0.3 MILLIGRAM(S): 2 INJECTION INTRAMUSCULAR; INTRAVENOUS; SUBCUTANEOUS at 02:15

## 2019-08-08 RX ADMIN — Medication 0.2 MILLIGRAM(S): at 21:36

## 2019-08-08 RX ADMIN — HYDROMORPHONE HYDROCHLORIDE 0.5 MILLIGRAM(S): 2 INJECTION INTRAMUSCULAR; INTRAVENOUS; SUBCUTANEOUS at 20:33

## 2019-08-08 RX ADMIN — Medication 1000 MILLIGRAM(S): at 17:50

## 2019-08-08 RX ADMIN — Medication 500 MILLIGRAM(S): at 08:20

## 2019-08-08 RX ADMIN — HYDROMORPHONE HYDROCHLORIDE 0.3 MILLIGRAM(S): 2 INJECTION INTRAMUSCULAR; INTRAVENOUS; SUBCUTANEOUS at 04:23

## 2019-08-08 RX ADMIN — Medication 0.2 MILLIGRAM(S): at 02:41

## 2019-08-08 RX ADMIN — Medication 0.2 MILLIGRAM(S): at 00:01

## 2019-08-08 NOTE — PROVIDER CONTACT NOTE (OTHER) - SITUATION
/78 
HR went as high as 200, O2: 84% on 2L NC. Increased O2 to 4L, O2: 98%, HR: 147. Pt due for 12.5 mg of metoprolol
Order was to change the patient's Blackman but the family refused. Unable to send urinalysis and urine culture until new Blackman is placed.
PCA 1st FS= 64, PCA 2nd UA=197, RN FS reading w/ different glucometer= 168
Patient temperature rectally was 101.0 and heart rate was 110
Patient's breathing labored/ post Neb tx
Patients axillary temp: 100.7
Respiratory Distress
Should gastric feeds be cont'd or stopped since peg tube site is leaking
Temp=100.4
Temperature 101.5 and Heart Rate 120
UO for shift: 125
Urine is leaking around gavin site. No output in gavin bag at this time.
noted small amount brownish discharge at peg insertion site
patients axillary temp 99.4 and rhonchi on auscultation
peg tube site leaking
pt temp-100.4, rectal 100.8
pt with temp 100.6
pts heart rate 134 and urine output/50mls since 2pm
rectal temp= 101.8; mews=7

## 2019-08-08 NOTE — PROVIDER CONTACT NOTE (OTHER) - NAME OF MD/NP/PA/DO NOTIFIED:
Anyi Elkins
BENJI De Santiago
BENJI Mcdonough
BENJI Sanchez
BENJI Sanchez
BENJI Stewart
BRET Blank
BRET Blank
BRET Gross
BRET Hirsch
BRET MAI
BRET Munoz
BRET Olivo
BRET Stiles
Rapid response called
charlie nazario
kenneth call
kenneth cheung
BENJI Stewart

## 2019-08-08 NOTE — PROGRESS NOTE ADULT - PROBLEM SELECTOR PLAN 7
and family have decided to keep patient in the hospital due to significant change in clinical status overnight. Family does not want pt to be uncomfortable and pass away in route to the house. They wish to keep pt as comfortable as possible. Hospice team made aware of above.   pt remains DNR/DNI.  and family have decided to keep patient in the hospital due to significant change in clinical status. Family does not want pt to be uncomfortable and pass away in route to the house. They wish to keep pt as comfortable as possible. Hospice team made aware of above.   pt remains DNR/DNI.

## 2019-08-08 NOTE — PROGRESS NOTE ADULT - PROBLEM SELECTOR PLAN 2
- c/w Dilaudid as above   - start Ativan 0.2 q6h ATC and c/w Ativan 0.2 mg q2h PRN for respiratory distress after back to back opioids are given. Please call palliate more than 3 PRN meds are given in < 24hrs  - start Robinul 0.4 mg q6h and scopolamine patch q72hr for increased airway secretions

## 2019-08-08 NOTE — PROGRESS NOTE ADULT - PROBLEM SELECTOR PLAN 3
c/w Seroquel 25 mg HS  - Ativan 0.2mg q2h PRN, please call palliate more than 3 PRN meds are given in < 24hrs

## 2019-08-08 NOTE — PROVIDER CONTACT NOTE (OTHER) - REASON
/78 
FS=64
Family refuse to change the Blackman
HR went as high as 200
Patient's breathing labored
Patients axillary temp: 100.7
Pt experiencing respiratory distress
Should gastric feeds be cont'd or stopped since peg tube site is leaking
Temp 99.4 and rhonchi on auscultation
Temp=100.4
Temperature 101.0 and Heart Rate 110
Temperature 101.5 and Heart Rate 120
UO for shift: 125
Urine is leaking around gavin
noted small amount brownish discharge at peg insertion site
peg tube site leaking
pt temp-100.4, rectal 100.8
pt with temp 100.6
pts heart rate 134 and urine output/50mls since 2pm
rectal temp= 101.8; mews=7

## 2019-08-08 NOTE — PROGRESS NOTE ADULT - SUBJECTIVE AND OBJECTIVE BOX
---___---___---___---___---___---___ ---___---___---___---___---___---___---___---___---                  M E D I C A L   A T T E N D I N G   P R O G R E S S   N O T E  ---___---___---___---___---___---___ ---___---___---___---___---___---___---___---___---        ================================================    ++CHIEF COMPLAINT:   Patient is a 69y old  Female who presents with a chief complaint of PEG tube dislodgement, fever, leukocytosis (08 Aug 2019 10:49)      currently in hospice   ---___---___---___---___---___---  PAST MEDICAL / Surgical  HISTORY:  PAST MEDICAL & SURGICAL HISTORY:  Type 2 diabetes mellitus  Dementia  DVT, lower extremity  CVA (cerebral vascular accident)  Fatty pancreas  PNA (pneumonia)  Pulmonary HTN  IGT (impaired glucose tolerance)  Ulcerative colitis  Acid reflux  Anxiety  Depression  Mouth sores  HLD (hyperlipidemia)  Asthma  S/P percutaneous endoscopic gastrostomy (PEG) tube placement: for dysphagia  Humeral head fracture  H/O: hysterectomy  H/O cataract extraction, left  History of knee replacement      ---___---___---___---___---___---  FAMILY HISTORY:   FAMILY HISTORY:  Family history of dementia (Grandparent)  Family history of colon cancer (Grandparent)        ---___---___---___---___---___---  ALLERGIES:   Allergies    ASA; dye contrast (Anaphylaxis)  aspirin (Short breath)  divalproex sodium (Other (Unknown))  Flowers and Plants (Short breath)  Haldol (Other (Unknown))  penicillin (Short breath; Rash)  sulfa drugs (Short breath; Rash)  vancomycin (Rash; Urticaria; Hives)  Xanax (Other (Unknown))    Intolerances        ---___---___---___---___---___---  MEDICATIONS:  MEDICATIONS  (STANDING):  artificial tears (preservative free) Ophthalmic Solution 1 Drop(s) Both EYES four times a day  BACItracin   Ointment 1 Application(s) Topical two times a day  chlorhexidine 2% Cloths 1 Application(s) Topical daily  clonazePAM  Tablet 1 milliGRAM(s) Oral at bedtime  Dakins Solution - 1/4 Strength 1 Application(s) Topical two times a day  dextrose 5%. 1000 milliLiter(s) (50 mL/Hr) IV Continuous <Continuous>  gabapentin   Solution 200 milliGRAM(s) Enteral Tube daily  glycopyrrolate Injectable 0.4 milliGRAM(s) IV Push every 6 hours  glycopyrrolate Injectable 0.4 milliGRAM(s) IV Push once  hydrocortisone 1% Cream 1 Application(s) Topical once  hydrocortisone 1% Cream 1 Application(s) Topical two times a day  HYDROmorphone Infusion 0.2 mG/Hr (0.2 mL/Hr) IV Continuous <Continuous>  LORazepam   Injectable 0.2 milliGRAM(s) IV Push every 6 hours  Medical Marijuana Awake Oil 2 milliLiter(s),Medical Marijuana Awake Oil 2 milliLiter(s) 2 milliLiter(s) Enteral Tube <User Schedule>  Medical Marijuana Calm Oil 2 milliLiter(s),Medical [place]ju[place] Calm Oil 2 milliLiter(s) 2 milliLiter(s) Enteral Tube <User Schedule>  Medical Marijuana [place] Oil 2 milliLiter(s),Medical Marijuana [place] Oil 2 milliLiter(s) 2 milliLiter(s) Enteral Tube <User Schedule>  QUEtiapine 25 milliGRAM(s) Oral at bedtime  scopolamine   Patch 1 Patch Transdermal every 72 hours  sodium chloride 0.9%. 1000 milliLiter(s) (50 mL/Hr) IV Continuous <Continuous>  tiZANidine 4 milliGRAM(s) Oral <User Schedule>    MEDICATIONS  (PRN):  acetaminophen  Suppository .. 650 milliGRAM(s) Rectal every 6 hours PRN Temp greater or equal to 38C (100.4F), Mild Pain (1 - 3)  carboxymethylcellulose 0.5% (Preservative-Free) Ophthalmic Solution 1 Drop(s) Both EYES three times a day PRN dry eyes  HYDROmorphone  Injectable 0.3 milliGRAM(s) IV Push every 2 hours PRN Severe Pain (7 - 10)  LORazepam   Injectable 0.2 milliGRAM(s) IV Push every 2 hours PRN agitation or respiratory distress that is persistent after back to back opioids are given.  nystatin Powder 1 Application(s) Topical three times a day PRN under breasts      ---___---___---___---___---___---  REVIEW OF SYSTEM:     unable to obtain     ---___---___---___---___---___---  VITAL SIGNS:  69y , CAPILLARY BLOOD GLUCOSE      POCT Blood Glucose.: 94 mg/dL (06 Aug 2019 17:08)    T(C): 37.9 (19 @ 06:00), Max: 38.1 (19 @ 05:14)  HR: 120 (19 @ 05:14) (120 - 120)  BP: 77/45 (19 @ 05:14) (77/45 - 77/45)  RR: 20 (19 @ 05:14) (20 - 20)  SpO2: 100% (19 @ 05:14) (100% - 100%)  ---___---___---___---___---___---  PHYSICAL EXAM:    GEN: A&O X 0 , NAD , comfortable  HEENT: NCAT, PERRL, MMM, hearing intact  Neck: supple , no JVD  CVS: S1S2 , regular , No M/R/G appreciated  PULM: CTA B/L,  no W/R/R appreciated  ABD.: soft. non tender, non distended,  bowel sounds present peg noted   Extrem: intact pulses , edema noted   Derm: No rash , no ecchymoses multiple decubitus ulcers noted         ---___---___---___---___---___---            LAB AND IMAGIN.4    12.40 )-----------( 239      ( 06 Aug 2019 11:53 )             23.3                                                 [All pertinent / recent Imaging reviewed]         ---___---___---___---___---___---___ ---___---___---___---___---                         A S S E S S M E N T   A N D   P L A N :      HEALTH ISSUES - PROBLEM Dx:    Hospice. peg feeds atopped   contiue hydromorphone drip   keep patient comfortable             --------------------------------------------  Case discussed with   Education given on   ___________________________  Thank you,  Deniz Bolden  [phone]

## 2019-08-08 NOTE — CHART NOTE - NSCHARTNOTEFT_GEN_A_CORE
MEDICINE PA    Notified by RN patient with temperature _100.6 axillary.  Pt given Tylenol 500 mg IV X 1, plus cooling measures, and currently temp is 98.8  Pt seen at bedside with  & daughters, appears to be in NAD, and spontaneously breathing  Pt is DNR/DNI; pt is receiving comfort measures    VITAL SIGNS:  T(C): 37.9 (08-08-19 @ 06:00), Max: 38.1 (08-08-19 @ 05:14)  HR: 120 (08-08-19 @ 05:14) (120 - 120)  BP: 77/45 (08-08-19 @ 05:14) (77/45 - 77/45)  RR: 20 (08-08-19 @ 05:14) (20 - 20)  SpO2: 100% (08-08-19 @ 05:14) (100% - 100%)  Wt(kg): --          ASSESSMENT/PLAN:   HPI:  69 y.o female with multiple comorbidities, advanced dementia, bed bound, multiple pressure ulcers, presented with Dislodged PEG tube is now s/p PEG replacement by IR. Her hospital course was complicated by TRANG. Nephology follow pt, TRANG likely multifactorial, pt is non-oliguric, Cr uptrending. Pt also noted to have hypervolemic hyponatremia, Na slowly improving. PT was also treated for sepsis 2/2 aspiration PNA, completed full course of meropenem per ID recs. Palliative care has been consulted for [facility].  Pt now on comfort measures; DNR/DNI    Pt developed fever 100.6 axillary, and responded positively with 500 mg IV Tylenol and cooling measures  Currently she is afebrile; family at her bedside are aware   She appears in NAD, breathing spontaneously    PLAN:  >Continue comfort measures as was planned with assistance of Palliative Team  >Family aware of above, and remain in agreement with current plans of care  >Endorsed to day team; follow up per Attending

## 2019-08-08 NOTE — PROVIDER CONTACT NOTE (OTHER) - BACKGROUND
69 year old female with multiple prolonged hospitalizations, PMH of Advanced dementia (nonverbal, dysphagia, with PEG tube, functional quadriplegic,. admitted with pge tube/dislodgement
69 year old female with multiple prolonged hospitalizations, PMH of Advanced dementia (nonverbal, dysphagia, with PEG tube, functional quadriplegic. admitted withpeg dislodgement
Aspiration Pnuemonia
Hx of aspiration PNA
Patient came in for bleeding at the PEG tube site.
Patient came in for dislodged PEG tube and fever
Patient came in for dislodged PEG tube and fever.
Patient gavin inserted at 7/13
Pt admitted for PEG tube dislodgment
Pt admitted for PEG tube dislodgment
Pt admitted w/ gastric tube issue.
patient admitted for PEG tube dislodgement
patient admitted for peg tube dislodgement
pt admitted w/ FTT.
pt admitted w/ issue re: gastro tube.
pt admitted with fever
pt admitted with gastrostomy malfunction
pt w/ PEG tube dislodgement, fevers and leukocytosis
Pt admitted w/ problem w/ gastro tube

## 2019-08-08 NOTE — PROVIDER CONTACT NOTE (OTHER) - DATE AND TIME:
05-Jul-2019 20:17
05-Jul-2019 23:45
06-Aug-2019 22:00
07-Aug-2019 02:57
08-Aug-2019 05:00
18-Jul-2019 01:15
18-Jul-2019 21:30
19-Jul-2019 17:45
19-Jul-2019 21:12
20-Jul-2019 06:07
20-Jul-2019 18:00
20-Jul-2019 20:00
24-Jun-2019 22:30
25-Jun-2019 06:53
26-Jun-2019 00:10
28-Jun-2019 17:30
30-Jul-2019 00:43
30-Jul-2019 23:00
31-Jul-2019 05:30
31-Jul-2019 21:21
31-Jul-2019 05:00

## 2019-08-08 NOTE — PROGRESS NOTE ADULT - SUBJECTIVE AND OBJECTIVE BOX
SUBJECTIVE AND OBJECTIVE: Pt was seen and examined at bedside. She has increased airway secretions, and work of breathing     INTERVAL HPI/OVERNIGHT EVENTS:  Pt became febrile overnight, required 1.8 mg of PRN Dilaudid for 24 hrs.      DNR on chart: Yes    Allergies    ASA; dye contrast (Anaphylaxis)  aspirin (Short breath)  divalproex sodium (Other (Unknown))  Flowers and Plants (Short breath)  Haldol (Other (Unknown))  penicillin (Short breath; Rash)  sulfa drugs (Short breath; Rash)  vancomycin (Rash; Urticaria; Hives)  Xanax (Other (Unknown))    Intolerances    MEDICATIONS  (STANDING):  acetaminophen  IVPB .. 1000 milliGRAM(s) IV Intermittent once  artificial tears (preservative free) Ophthalmic Solution 1 Drop(s) Both EYES four times a day  BACItracin   Ointment 1 Application(s) Topical two times a day  chlorhexidine 2% Cloths 1 Application(s) Topical daily  clonazePAM  Tablet 1 milliGRAM(s) Oral at bedtime  Dakins Solution - 1/4 Strength 1 Application(s) Topical two times a day  dextrose 5%. 1000 milliLiter(s) (50 mL/Hr) IV Continuous <Continuous>  gabapentin   Solution 200 milliGRAM(s) Enteral Tube daily  glycopyrrolate Injectable 0.4 milliGRAM(s) IV Push every 6 hours  glycopyrrolate Injectable 0.4 milliGRAM(s) IV Push once  hydrocortisone 1% Cream 1 Application(s) Topical once  hydrocortisone 1% Cream 1 Application(s) Topical two times a day  HYDROmorphone Infusion 0.2 mG/Hr (0.2 mL/Hr) IV Continuous <Continuous>  LORazepam   Injectable 0.2 milliGRAM(s) IV Push every 6 hours  Medical Marijuana Awake Oil 2 milliLiter(s),Medical Marijuana Awake Oil 2 milliLiter(s) 2 milliLiter(s) Enteral Tube <User Schedule>  Medical Marijuana Calm Oil 2 milliLiter(s),Medical Marinjuana Calm Oil 2 milliLiter(s) 2 milliLiter(s) Enteral Tube <User Schedule>  Medical Marijuana [place] Oil 2 milliLiter(s),Medical Marijuana [place] Oil 2 milliLiter(s) 2 milliLiter(s) Enteral Tube <User Schedule>  QUEtiapine 25 milliGRAM(s) Oral at bedtime  scopolamine   Patch 1 Patch Transdermal every 72 hours  sodium chloride 0.9%. 1000 milliLiter(s) (50 mL/Hr) IV Continuous <Continuous>  tiZANidine 4 milliGRAM(s) Oral <User Schedule>    MEDICATIONS  (PRN):  acetaminophen  Suppository .. 650 milliGRAM(s) Rectal every 6 hours PRN Temp greater or equal to 38C (100.4F), Mild Pain (1 - 3)  carboxymethylcellulose 0.5% (Preservative-Free) Ophthalmic Solution 1 Drop(s) Both EYES three times a day PRN dry eyes  HYDROmorphone  Injectable 0.3 milliGRAM(s) IV Push every 2 hours PRN Severe Pain (7 - 10)  LORazepam   Injectable 0.2 milliGRAM(s) IV Push every 2 hours PRN agitation or respiratory distress that is persistent after back to back opioids are given.  nystatin Powder 1 Application(s) Topical three times a day PRN under breasts      ITEMS UNCHECKED ARE NOT PRESENT    PRESENT SYMPTOMS: [x ]Unable to obtain due to poor mentation   Source if other than patient:  [x ]Family   [ x]Team     Pain:  [x ] yes [ ] no    Dyspnea:                           [ ]Mild [x ]Moderate [ ]Severe  Anxiety:                             [ ]Mild [ ]Moderate [ ]Severe  Fatigue:                             [ ]Mild [ ]Moderate [ ]Severe  Nausea:                             [ ]Mild [ ]Moderate [ ]Severe  Loss of appetite:              [ ]Mild [ ]Moderate [ ]Severe  Constipation:                    [ ]Mild [ ]Moderate [ ]Severe      Other Symptoms:  [ ]All other review of systems negative     Karnofsky Performance Score/Palliative Performance Status Version 2:     10    %    http://npcrc.org/files/news/palliative_performance_scale_ppsv2.pdf  PPSv2.pdf  PHYSICAL EXAM:  Vital Signs Last 24 Hrs  T(C): 37.9 (08 Aug 2019 06:00), Max: 38.1 (08 Aug 2019 05:14)  T(F): 100.3 (08 Aug 2019 06:00), Max: 100.6 (08 Aug 2019 05:14)  HR: 120 (08 Aug 2019 05:14) (120 - 120)  BP: 77/45 (08 Aug 2019 05:14) (77/45 - 77/45)  BP(mean): --  RR: 20 (08 Aug 2019 05:14) (20 - 20)  SpO2: 100% (08 Aug 2019 05:14) (100% - 100%) I&O's Summary   GENERAL:  [ ]Alert  [ ]Oriented x   [ ]Lethargic  [ x]Cachexia  [x ]Unarousable  [ ]Verbal  [ x]Non-Verbal  Behavioral:   [ ] Anxiety  [ ] Delirium [ ] Agitation [ ] Other  HEENT:  [ ]Normal   [ x]Dry mouth   [ ]ET Tube/Trach  [ ]Oral lesions  PULMONARY:   [ ]Clear [ ]Tachypnea  [ ]Audible excessive secretions   [ ]Rhonchi        [ ]Right [ ]Left [ ]Bilateral  x[ ]Crackles        [ ]Right [ ]Left [ x]Bilateral  [ ]Wheezing     [ ]Right [ ]Left [ ]Bilateral  CARDIOVASCULAR:    [ ]Regular [ ]Irregular [x ]Tachy  [ ]Sourav [ ]Murmur [ ]Other  GASTROINTESTINAL:  [x ]Soft  [ ]Distended   [x ]+BS  [ ]Non tender [ ]Tender  [x ]PEG [ ]OGT/ NGT   Last BM: today   GENITOURINARY:  [ ]Normal [ ] Incontinent   [ ]Oliguria/Anuria   [ x]Blackman  MUSCULOSKELETAL:   [ ]Normal   [ ]Weakness  [ x]Bed/Wheelchair bound [x ]Edema (non pitting)  NEUROLOGIC:   [ ]No focal deficits  [x ] Cognitive impairment  [ ] Dysphagia [ ]Dysarthria [ ] Paresis [ ]Other   SKIN:   [ ]Normal   [x ]Pressure ulcer(s)  [ x]Rash    CRITICAL CARE:  [ ] Shock Present  [ ]Septic [ ]Cardiogenic [ ]Neurologic [ ]Hypovolemic  [ ]  Vasopressors [ ]  Inotropes  [ ] Respiratory failure present [ ] Mechanical Ventilation [ ] Non-invasive ventilatory support [ ] High-Flow  [ ] Acute  [ ] Chronic [ ] Hypoxic  [ ] Hypercarbic [ ] Other  [ ] Other organ failure     LABS:                        7.4    12.40 )-----------( 239      ( 06 Aug 2019 11:53 )             23.3             RADIOLOGY & ADDITIONAL STUDIES:    Protein Calorie Malnutrition Present: [x ] yes [ ] no  [x ] PPSV2 < or = 30%  [ ] significant weight loss [ ] poor nutritional intake [ ] anasarca [ ] catabolic state Albumin, Serum: 2.5 g/dL (08-03-19 @ 13:47)  Artificial Nutrition [ ]     REFERRALS:   [x ]Chaplaincy  [x ] Hospice  [ ]Child Life  [ ]Social Work  [ ]Case management [ ]Holistic Therapy     Goals of Care Document:  SPreston Jame (08-06-19 @ 14:52)  Goals of Care Conversation:   Participants:  · Spouse  Caleb  · Child(jorge)  Tere and Angie  · Provider  Dr Kilgore, Dr Elizabeth Haley.  · Nurse  Yanira Skelton (Hospice Nurse Liaison)    Advance Directives:  · Does patient have Advance Directive  Yes  · Indicate Type  Health Care Proxy (HCP)  · Agent's Name  Caleb Colon (spouse)  · Phone #  [phone]  · If HCP is not on chart, identify the persons name and phone number contacted to bring in document  Caleb  · Are any of the items on the chart  No  · Does Patient Have a Surrogate  Yes  · Surrogate's Name   as above.  · Caregiver:  yes  · Name   and children    Conversation Discussion:  · Conversation  Diagnosis; Prognosis; MOLST Discussed; Treatment Options; Hospice Referral  · Conversation Details  Patient's family was able to give verbalized understanding about her advanced illness and very poor prognosis ( likely hours to days). Her  indicated that at this point, the patient's dignity and comfort are of upmost importance. His family also indicated the patient may not have wanted heroic measures at this point and therefore a MOLST form was completed with DNR/I. We also discussed about tube feeds and IV hydration. For now, her surrogate would like to continue tube feeds and IV hydration; however, he understand that these Rxs may cause adverse effects (fluid overload) and if so he will be open to holding or DC them.     The patient' family had question about clinical changes before dying and information about it was given     50' were spent in ACP.      Electronic Signatures:  Alphonso Kilgore)  (Signed 06-Aug-2019 15:02)  	Authored: Goals of Care Conversation      Last Updated: 06-Aug-2019 15:02 by Alphonso Kilgore)      Care Coordination Assessment [C. Provider] (06-25-19 @ 09:39) SUBJECTIVE AND OBJECTIVE: Pt was seen and examined at bedside. She has increased airway secretions, and work of breathing     INTERVAL HPI/OVERNIGHT EVENTS:  Pt became febrile overnight, required 1.8 mg of PRN Dilaudid for 24 hrs.      DNR on chart: Yes    Allergies    ASA; dye contrast (Anaphylaxis)  aspirin (Short breath)  divalproex sodium (Other (Unknown))  Flowers and Plants (Short breath)  Haldol (Other (Unknown))  penicillin (Short breath; Rash)  sulfa drugs (Short breath; Rash)  vancomycin (Rash; Urticaria; Hives)  Xanax (Other (Unknown))    Intolerances    MEDICATIONS  (STANDING):  acetaminophen  IVPB .. 1000 milliGRAM(s) IV Intermittent once  artificial tears (preservative free) Ophthalmic Solution 1 Drop(s) Both EYES four times a day  BACItracin   Ointment 1 Application(s) Topical two times a day  chlorhexidine 2% Cloths 1 Application(s) Topical daily  clonazePAM  Tablet 1 milliGRAM(s) Oral at bedtime  Dakins Solution - 1/4 Strength 1 Application(s) Topical two times a day  dextrose 5%. 1000 milliLiter(s) (50 mL/Hr) IV Continuous <Continuous>  gabapentin   Solution 200 milliGRAM(s) Enteral Tube daily  glycopyrrolate Injectable 0.4 milliGRAM(s) IV Push every 6 hours  glycopyrrolate Injectable 0.4 milliGRAM(s) IV Push once  hydrocortisone 1% Cream 1 Application(s) Topical once  hydrocortisone 1% Cream 1 Application(s) Topical two times a day  HYDROmorphone Infusion 0.2 mG/Hr (0.2 mL/Hr) IV Continuous <Continuous>  LORazepam   Injectable 0.2 milliGRAM(s) IV Push every 6 hours  Medical Marijuana Awake Oil 2 milliLiter(s),Medical Marijuana Awake Oil 2 milliLiter(s) 2 milliLiter(s) Enteral Tube <User Schedule>  Medical Marijuana Calm Oil 2 milliLiter(s),Medical [place]ju[place] Calm Oil 2 milliLiter(s) 2 milliLiter(s) Enteral Tube <User Schedule>  Medical Marijuana [place] Oil 2 milliLiter(s),Medical Marijuana [place] Oil 2 milliLiter(s) 2 milliLiter(s) Enteral Tube <User Schedule>  QUEtiapine 25 milliGRAM(s) Oral at bedtime  scopolamine   Patch 1 Patch Transdermal every 72 hours  sodium chloride 0.9%. 1000 milliLiter(s) (50 mL/Hr) IV Continuous <Continuous>  tiZANidine 4 milliGRAM(s) Oral <User Schedule>    MEDICATIONS  (PRN):  acetaminophen  Suppository .. 650 milliGRAM(s) Rectal every 6 hours PRN Temp greater or equal to 38C (100.4F), Mild Pain (1 - 3)  carboxymethylcellulose 0.5% (Preservative-Free) Ophthalmic Solution 1 Drop(s) Both EYES three times a day PRN dry eyes  HYDROmorphone  Injectable 0.3 milliGRAM(s) IV Push every 2 hours PRN Severe Pain (7 - 10)  LORazepam   Injectable 0.2 milliGRAM(s) IV Push every 2 hours PRN agitation or respiratory distress that is persistent after back to back opioids are given.  nystatin Powder 1 Application(s) Topical three times a day PRN under breasts      ITEMS UNCHECKED ARE NOT PRESENT    PRESENT SYMPTOMS: [x ]Unable to obtain due to poor mentation   Source if other than patient:  [x ]Family   [ x]Team     Pain:  [x ] yes [ ] no    Dyspnea:                           [ ]Mild [x ]Moderate [ ]Severe  Anxiety:                             [ ]Mild [ ]Moderate [ ]Severe  Fatigue:                             [ ]Mild [ ]Moderate [ ]Severe  Nausea:                             [ ]Mild [ ]Moderate [ ]Severe  Loss of appetite:              [ ]Mild [ ]Moderate [ ]Severe  Constipation:                    [ ]Mild [ ]Moderate [ ]Severe    PAIN AD: 5    Other Symptoms:  [ ]All other review of systems negative     Karnofsky Performance Score/Palliative Performance Status Version 2:     10    %    http://npcrc.org/files/news/palliative_performance_scale_ppsv2.pdf  PPSv2.pdf  PHYSICAL EXAM:  Vital Signs Last 24 Hrs  T(C): 37.9 (08 Aug 2019 06:00), Max: 38.1 (08 Aug 2019 05:14)  T(F): 100.3 (08 Aug 2019 06:00), Max: 100.6 (08 Aug 2019 05:14)  HR: 120 (08 Aug 2019 05:14) (120 - 120)  BP: 77/45 (08 Aug 2019 05:14) (77/45 - 77/45)  BP(mean): --  RR: 20 (08 Aug 2019 05:14) (20 - 20)  SpO2: 100% (08 Aug 2019 05:14) (100% - 100%) I&O's Summary   GENERAL:  [ ]Alert  [ ]Oriented x   [ ]Lethargic  [ x]Cachexia  [x ]Unarousable  [ ]Verbal  [ x]Non-Verbal  Behavioral:   [ ] Anxiety  [ ] Delirium [ ] Agitation [ ] Other  HEENT:  [ ]Normal   [ x]Dry mouth   [ ]ET Tube/Trach  [ ]Oral lesions  PULMONARY:   [ ]Clear [ ]Tachypnea  [ x]Audible excessive secretions   [ ]Rhonchi        [ ]Right [ ]Left [ ]Bilateral  x[ ]Crackles        [ ]Right [ ]Left [ x]Bilateral  [ ]Wheezing     [ ]Right [ ]Left [ ]Bilateral  CARDIOVASCULAR:    [ ]Regular [ ]Irregular [x ]Tachy  [ ]Sourav [ ]Murmur [ ]Other  GASTROINTESTINAL:  [x ]Soft  [ ]Distended   [x ]+BS  [ ]Non tender [ ]Tender  [x ]PEG [ ]OGT/ NGT   Last BM: today   GENITOURINARY:  [ ]Normal [ ] Incontinent   [ ]Oliguria/Anuria   [ x]Blackman  MUSCULOSKELETAL:   [ ]Normal   [ ]Weakness  [ x]Bed/Wheelchair bound [x ]Edema (non pitting)  NEUROLOGIC:   [ ]No focal deficits  [x ] Cognitive impairment  [ ] Dysphagia [ ]Dysarthria [ ] Paresis [ ]Other   SKIN:   [ ]Normal   [x ]Pressure ulcer(s)  [ x]Rash    CRITICAL CARE:  [ ] Shock Present  [ ]Septic [ ]Cardiogenic [ ]Neurologic [ ]Hypovolemic  [ ]  Vasopressors [ ]  Inotropes  [ ] Respiratory failure present [ ] Mechanical Ventilation [ ] Non-invasive ventilatory support [ ] High-Flow  [ ] Acute  [ ] Chronic [ ] Hypoxic  [ ] Hypercarbic [ ] Other  [ ] Other organ failure     LABS:                        7.4    12.40 )-----------( 239      ( 06 Aug 2019 11:53 )             23.3             RADIOLOGY & ADDITIONAL STUDIES:  Reviewed.   Protein Calorie Malnutrition Present: [x ] yes [ ] no  [x ] PPSV2 < or = 30%  [ ] significant weight loss [ ] poor nutritional intake [ ] anasarca [ ] catabolic state Albumin, Serum: 2.5 g/dL (08-03-19 @ 13:47)  Artificial Nutrition [ ]     REFERRALS:   [x ]Chaplaincy  [x ] Hospice  [ ]Child Life  [ ]Social Work  [ ]Case management [ ]Holistic Therapy     Goals of Care Document:  DEE Kilgore (08-06-19 @ 14:52)  Goals of Care Conversation:   Participants:  · Spouse  Caleb  · Child(jorge)  Tere and Angie  · Provider  Dr Kilgore, Dr Elizabeth Haley.  · Nurse  Yanira Skelton (Hospice Nurse Liaison)    Advance Directives:  · Does patient have Advance Directive  Yes  · Indicate Type  Health Care Proxy (HCP)  · Agent's Name  Caleb Colon (spouse)  · Phone #  [phone]  · If HCP is not on chart, identify the persons name and phone number contacted to bring in document  Caleb  · Are any of the items on the chart  No  · Does Patient Have a Surrogate  Yes  · Surrogate's Name   as above.  · Caregiver:  yes  · Name   and children    Conversation Discussion:  · Conversation  Diagnosis; Prognosis; MOLST Discussed; Treatment Options; Hospice Referral  · Conversation Details  Patient's family was able to give verbalized understanding about her advanced illness and very poor prognosis ( likely hours to days). Her  indicated that at this point, the patient's dignity and comfort are of upmost importance. His family also indicated the patient may not have wanted heroic measures at this point and therefore a MOLST form was completed with DNR/I. We also discussed about tube feeds and IV hydration. For now, her surrogate would like to continue tube feeds and IV hydration; however, he understand that these Rxs may cause adverse effects (fluid overload) and if so he will be open to holding or DC them.     The patient' family had question about clinical changes before dying and information about it was given     50' were spent in ACP.      Electronic Signatures:  Alphonso Kilgore)  (Signed 06-Aug-2019 15:02)  	Authored: Goals of Care Conversation      Last Updated: 06-Aug-2019 15:02 by Alphonso Kilgore)      Care Coordination Assessment [C. Provider] (06-25-19 @ 09:39)

## 2019-08-08 NOTE — PROGRESS NOTE ADULT - PROBLEM SELECTOR PLAN 1
- Patient required 2.6 mg of Dilaudid in 24hrs, 1.8 mg of PRN does.   - start Dilaudid 0.2 mg gtt and Dilaudid 0.3 Q2h PRN for breakthrough pain and dyspnea     - On medical Marijuana   - c/w gabapentin but will decrease it to 200 mg q daily due to eGFR   - Continue Tylenol   - continue using PAINAD score for assessment - Patient required 2.6 mg of Dilaudid in 24hrs, 1.8 mg of PRN doses.   - start Dilaudid 0.2 mg gtt and Dilaudid 0.3 Q2h PRN for breakthrough pain and dyspnea     - On medical Marijuana   - c/w gabapentin  200 mg q daily   - Continue Tylenol   - continue using PAINAD score for assessment

## 2019-08-08 NOTE — PROGRESS NOTE ADULT - REASON FOR ADMISSION
PEG tube dislodgement, fever, leukocytosis
PEG tube dislodgement, fever, leukocytosis, pneumonia
PEG tube dislodgement, fever, leukocytosis

## 2019-08-09 NOTE — CHART NOTE - NSCHARTNOTEFT_GEN_A_CORE
Informed by RN that family noted pt. no longer breathing  Pt found in bed, with  & daughters in her room at her side  Pt examined, heart sounds absent, no spontaneous respirations, pupils fixed and dilated, no palpable pulse  Pt pronounced  @ 12:21 AM, Aug. 9, 2019  Family declined autopsy  Dr. Bolden notified    Family offered condolences

## 2019-08-09 NOTE — DISCHARGE NOTE FOR THE EXPIRED PATIENT - HOSPITAL COURSE
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   PEG replaced in IR. Hosp. course complicated by TRANG, likely multifactorial. Also noted to have hypervolemic hyponatremia. Pt also was treated for sepsis 2/2 aspiration pneumonia, and completed course of IV antibiotics.   Palliative Care team was consulted, and  decided to declare his wife DNR/DNI. Prognosis was poor, and no aggressive treatments were continued, comfort measures only.  Pt  peacefully, with family at her bedside

## 2019-09-16 NOTE — PROGRESS NOTE ADULT - PROVIDER SPECIALTY LIST ADULT
Cardiology Please see the September 16, 2019 Anticoagulation encounter for further information.  Kait Manriquez RN, [facility]

## 2019-10-11 NOTE — DISCHARGE NOTE NURSING/CASE MANAGEMENT/SOCIAL WORK - NSDCDPATPORTLINK_GEN_ALL_CORE
Medications reviewed with patient and recorded  Denies known Latex allergy or symptoms of Latex sensitivity.  Pt comes in today for f/u rt knee.  Pt states that she is doing pretty good. Pt states that she still uses a brace for exercise and walking long distances. Pt states that PT is going well.   You can access the [facility] Patient Portal, offered by [facility], by registering with the following website: [email or web address]

## 2019-10-23 NOTE — CONSULT NOTE ADULT - SUBJECTIVE AND OBJECTIVE BOX
Notified alert team patient is ready for assessment    [facility] DIVISION OF KIDNEY DISEASES AND HYPERTENSION -- INITIAL CONSULT NOTE  --------------------------------------------------------------------------------  HPI:    68F pmhx of asthma, Alzheimer's anxiety/depression, HLD, UC, HTN presented  with weakness.  She was recently hospitalized here for a right hip fracture s/p hip hemiarthroplasty after a fracture, sent home, was taking oxycodone for pain, which helped initially but less effective past few days.  Presented to ED yesterday because family noted worsening pain, weakness, confusion, chills,  usually able to walk with walker and assistance but now, not able to stand up, also noted difficulty with micturition and constipation. In the ED  had 1 episode of low grade fever, HR 60-92 /58 RR 18. Labs showed leukocytosis, HAGMA, hyponatremia with Na of 125. CT of A/P in addition to fracture of left acetabulum shows a RT sided fluid collection along upper thigh,  received  Meropenum and Vancomycin, 2LNs and also 2 amps of D50 for symptomatic (AMS) hypoglycemia.     During the afternoon labs were repeated, showing worsening Na to 120, for which nephrology was consulted , started on 2%saline at 40cc/hr. Around 11 pm RRT was called after patient was noted to have worsening confusion, chill, hypotensive, needed to be transferred to SICU requiring inotropic support. Na repeated was 126 mEq.     Today during examination patient was awake still confused, family at bed side, saying she has dementia confuse at baseline but now is worse. Off pressors, Na this morning was 129mEq, 2% saline was switched to d5w with NS at 100 cc/hr.       PAST HISTORY  --------------------------------------------------------------------------------  PAST MEDICAL & SURGICAL HISTORY:  CVA (cerebral vascular accident)  Fatty pancreas  PNA (pneumonia)  Pulmonary HTN  IGT (impaired glucose tolerance)  Ulcerative colitis  Acid reflux  Anxiety  Depression  Mouth sores  HLD (hyperlipidemia)  Asthma  Humeral head fracture  H/O: hysterectomy  H/O cataract extraction, left  History of knee replacement    FAMILY HISTORY:  Family history of dementia (Grandparent)  Family history of colon cancer (Grandparent)    PAST SOCIAL HISTORY:    ALLERGIES & MEDICATIONS  --------------------------------------------------------------------------------  Allergies    ASA; dye contrast (Anaphylaxis)  aspirin (Short breath)  divalproex sodium (Other (Unknown))  Haldol (Other (Unknown))  penicillin (Short breath; Rash)  sulfa drugs (Short breath; Rash)  Xanax (Other (Unknown))    Intolerances      Standing Inpatient Medications  ALBUTerol/ipratropium for Nebulization 3 milliLiter(s) Nebulizer every 6 hours  buDESOnide   0.5 milliGRAM(s) Respule 0.5 milliGRAM(s) Inhalation every 12 hours  cefepime   IVPB 1000 milliGRAM(s) IV Intermittent every 12 hours  chlorhexidine 4% Liquid 1 Application(s) Topical <User Schedule>  dextrose 5% + sodium chloride 0.9%. 1000 milliLiter(s) IV Continuous <Continuous>  docusate sodium 100 milliGRAM(s) Oral daily  haloperidol    Injectable 2.5 milliGRAM(s) IV Push once  hydrocortisone sodium succinate Injectable 50 milliGRAM(s) IV Push every 6 hours  magnesium sulfate  IVPB 2 Gram(s) IV Intermittent once  potassium chloride  20 mEq/100 mL IVPB 20 milliEquivalent(s) IV Intermittent every 2 hours  potassium phosphate IVPB 30 milliMole(s) IV Intermittent once  QUEtiapine 50 milliGRAM(s) Oral at bedtime  senna 2 Tablet(s) Oral at bedtime  vancomycin  IVPB 750 milliGRAM(s) IV Intermittent every 12 hours    PRN Inpatient Medications  acetaminophen   Tablet. 650 milliGRAM(s) Oral every 6 hours PRN      REVIEW OF SYSTEMS  --------------------------------------------------------------------------------  Unable to obtain patient is confused.     All other systems were reviewed and are negative, except as noted.    VITALS/PHYSICAL EXAM  --------------------------------------------------------------------------------  T(C): 37 (07-27-18 @ 07:00), Max: 37.9 (07-26-18 @ 22:15)  HR: 122 (07-27-18 @ 08:15) (60 - 126)  BP: 117/57 (07-27-18 @ 08:00) (80/42 - 149/78)  RR: 38 (07-27-18 @ 08:15) (11 - 38)  SpO2: 99% (07-27-18 @ 08:15) (92% - 100%)  Wt(kg): --  Height (cm): 147.32 (07-27-18 @ 02:10)  Weight (kg): 50.4 (07-27-18 @ 02:10)  BMI (kg/m2): 23.2 (07-27-18 @ 02:10)  BSA (m2): 1.42 (07-27-18 @ 02:10)      07-26-18 @ 07:01  -  07-27-18 @ 07:00  --------------------------------------------------------  IN: 711 mL / OUT: 270 mL / NET: 441 mL    07-27-18 @ 07:01  -  07-27-18 @ 08:48  --------------------------------------------------------  IN: 100 mL / OUT: 45 mL / NET: 55 mL      Physical Exam:  	Gen: Elderly, confused  	HEENT: PERRL, supple neck,   	Pulm: CTA B/L  	CV: RRR, S1S2; no rub  	Abd: +BS, soft, nontender/nondistended  	: Hiral noted.   	UE: Warm, no edema  	LE: Warm, no edema  	Neuro: Confused.  	Skin: Warm, without rashes    LABS/STUDIES  --------------------------------------------------------------------------------              9.0    12.6  >-----------<  195      [07-27-18 @ 05:32]              27.0     129  |  101  |  27  ----------------------------<  99      [07-27-18 @ 05:32]  3.4   |  16  |  1.07        Ca     7.3     [07-27-18 @ 05:32]      Mg     1.8     [07-27-18 @ 05:32]      Phos  2.5     [07-27-18 @ 05:32]    TPro  4.6  /  Alb  2.4  /  TBili  0.4  /  DBili  0.2  /  AST  14  /  ALT  10  /  AlkPhos  162  [07-27-18 @ 05:32]    PT/INR: PT 16.5 , INR 1.50       [07-27-18 @ 05:32]  PTT: 35.8       [07-27-18 @ 05:32]    CK 65      [07-27-18 @ 05:32]    Creatinine Trend:  SCr 1.07 [07-27 @ 05:32]  SCr 1.06 [07-26 @ 22:57]  SCr 1.04 [07-26 @ 18:58]  SCr 1.18 [07-26 @ 12:39]    Urinalysis - [07-27-18 @ 04:56]      Color Yellow / Appearance Clear / SG 1.019 / pH 6.0      Gluc Negative / Ketone Negative  / Bili Negative / Urobili Negative       Blood Negative / Protein Trace / Leuk Est Large / Nitrite Negative      RBC 0-2 / WBC 2-5 / Hyaline  / Gran  / Sq Epi  / Non Sq Epi  / Bacteria     Urine Creatinine 81      [07-27-18 @ 04:56]  Urine Sodium 27      [07-27-18 @ 04:56]  Urine Osmolality 298      [07-27-18 @ 08:12]    HbA1c 5.8      [09-04-17 @ 08:29]  TSH 2.31      [03-08-18 @ 07:54]

## 2019-10-23 NOTE — PROGRESS NOTE ADULT - ASSESSMENT
RT: Dr. Nuñez    Alternate order:  Kidney and Bladder Urinary Tract pended for your approval.    68 year-old woman with Alzheimer's dementia seen to have volume overload and biventricular congestive heart failure.  TTE on this admission shows reduced LVEF, elevated pulmonary pressures.    Continue Lasix with 20 mg IVP x1 tonight. I do not believe she had an allergic reaction to Lasix (she has gotten Lasix on several previous admissions without incident), but if she develops rash, would treat with steroids and benadryl. More likely, the itching last night was secondary to vancomycin which has been discontinued. Patient does not appear toxic or septic.  Keep O > I.  Keep K > 4.0 and Mag > 2.0.  Monitor BUN/Cr with daily labs.    Given severely reduced LVEF, plan to start beta-blocker and ACEi prior to discharge.  Can start lisinopril 5mg daily tomorrow morning.  Would start beta-blocker once patient is more euvolemic. Suggest carvedilol 3.125mg BID, likely to start tomorrow or Thursday.    Case discussed at length with patient's  by phone and daughter at bedside.  Case discussed with primary care physician and admitting hospitalist, Deniz Bolden MD.  Case discussed with ID attending, Rubén Powell MD.

## 2019-12-22 NOTE — CHART NOTE - NSCHARTNOTESELECT_GEN_ALL_CORE
Event Note
Event Note/Bacteremia
Event Note/Fever
Event Note/PAT
Event Note/RLE DVT
Event Note/fever
Event Note/hypotension
Fever/Event Note
Nutrition Services
Palliative Care/Off Service Note
Rapid Response
Rapid Response/Hospitalist
Rheumatology/Event Note
febrile/Event Note
recurrent low grade fever/Event Note
no diabetes and no thyroid trouble.

## 2020-01-09 NOTE — SWALLOW BEDSIDE ASSESSMENT ADULT - SWALLOW EVAL: DIAGNOSIS
Case d/w RN Daisy via phone. As per RN, patient to be placed on BIPAP. Re-evaluation of swallow will be held at this time. This service will f/u as clinically appropriate. Needs appt with labs just ordered

## 2020-02-06 NOTE — DIETITIAN INITIAL EVALUATION ADULT. - PROBLEM SELECTOR PLAN 3
Patient is asking for a favor on Lorazepam. Per patient PCP on vacation. He would like enough medication until PCP is back from vacation. He does not want to end up in the hospital due to withdrawals. MARAH Rosales will be paging Dr Bhatt to see if this okay for us to fill. Please advise. Sugey Vicente, CMA      Vertebral fracture along with pelvic fracture likely cause but in septic setting will also get MRI to r/o spinal abscess  Pt cannot take contrast due to anaphylaxis.   Oxycodone 10mg Q4hrs standing (pts home dose since surgery) and Morphine 2mg IV Q2hrs PRN breakthrough pain

## 2020-02-09 NOTE — DISCHARGE NOTE NURSING/CASE MANAGEMENT/SOCIAL WORK - FLU SEASON?
Yes...
Fracture (Right Radius Fracture)    PLEASE FOLLOW UP WITH DR. MONTALVO (ORTHOPEDIST) IN 1-3 DAYS FOR FURTHER EVALUATION AND MANAGEMENT AS DISCUSSED.    A fracture is a break in one of your bones. This can occur from a variety of injuries, especially traumatic ones. Symptoms include pain, bruising, or swelling. Do not use the injured limb. If a fracture is in one of the bones below your waist, do not put weight on that limb unless instructed to do so by your healthcare provider. Crutches or a cane may have been provided. A splint or cast may have been applied by your health care provider. Make sure to keep it dry and follow up with an orthopedist as instructed.    SEEK IMMEDIATE MEDICAL CARE IF YOU HAVE ANY OF THE FOLLOWING SYMPTOMS: numbness, tingling, increasing pain, or weakness in any part of the injured limb.

## 2020-02-14 NOTE — ED ADULT NURSE NOTE - NS ED NURSE LEVEL OF CONSCIOUSNESS MENTAL STATUS
55 y/o M w/ PMH migraines presents to the ED for L sided headache x5days. Pt states over the past 5 days had constant headache typical of previous migraines. Pt took 500mg Tylenol w/ no relief. Denies any fever, chills, nausea, vomiting, shortness of breath, chest pain, or vision changes. Denies any trauma or falls. Denies any recent sick contacts or travel.
Alert/Awake

## 2020-03-12 NOTE — PROVIDER CONTACT NOTE (CRITICAL VALUE NOTIFICATION) - NAME OF MD/NP/PA/DO NOTIFIED:
Ritu Morataya, NP
BENJI De Santiago
BENJI Morataya
BENJI Quick
BRET Blank
BRET Blank
Dang Morton, NP
Ritu Morataya, NP
kenneth call
kenneth davis
No

## 2020-04-05 NOTE — PROGRESS NOTE ADULT - PROBLEM SELECTOR PROBLEM 4
Depression
Mild asthma without complication, unspecified whether persistent
0
Acute hip pain, right
Acute hip pain, right
Cystitis
Encephalopathy acute
Encephalopathy, metabolic
Encephalopathy, metabolic
PNA (pneumonia)
Pulmonary HTN
Pulmonary HTN

## 2020-05-26 NOTE — CHART NOTE - NSCHARTNOTEFT_GEN_A_CORE
[id number]  MYRIAM HEATH    Notified by RN patient with critical lab result of Lactate, Blood (03.31.19 @ 06:29)    Lactate, Blood: 5.9 mmol/L    Vital Signs Last 24 Hrs  T(C): 36.6 (31 Mar 2019 04:04), Max: 38.9 (30 Mar 2019 22:10)  T(F): 97.9 (31 Mar 2019 04:04), Max: 102 (30 Mar 2019 22:10)  HR: 89 (31 Mar 2019 04:19) (89 - 123)  BP: 144/88 (31 Mar 2019 04:19) (132/67 - 156/85)  BP(mean): --  RR: 18 (31 Mar 2019 04:19) (18 - 18)  SpO2: 95% (31 Mar 2019 04:19) (93% - 96%)    Pt seen, appeared in NAD, resting comfortable. Does not appear toxic; afebrile  Pt with known hx of CHF.     Plan of Care/ Interventions:  -Continue to monitor  -Continue IV Vancomycin  -ID to follow         -Further recs pending  -Endorsed to day team    CYN AlmaguerC  Dept of Medicine Pt called office requesting medication refill.      Rx requested:  Requested Prescriptions     Pending Prescriptions Disp Refills    amLODIPine (NORVASC) 2.5 MG tablet 90 tablet 3     Sig: Take 1 tablet by mouth daily    ibuprofen (ADVIL;MOTRIN) 800 MG tablet 30 tablet      Sig: Take 1 tablet by mouth every 6 hours as needed for Pain       Last Office Visit:   6/11/2019      Next Visit Date:  Future Appointments   Date Time Provider Rom Bhakta   6/11/2020  3:15 PM Boyd Elias  [place], [place]

## 2020-06-17 NOTE — PROGRESS NOTE ADULT - SUBJECTIVE AND OBJECTIVE BOX
Patient called and sated that her  was informed that metformin is being discontinued.  Patient stated that she takes metformin  Please advise.   Called for emergent intubation at request Dr. Ni. Pt on high-flow on arrival, tachypneic, respiratory distress. SaO2 96%. pt responsive,  at bedside, discussed intubation, all questions answered, and agree to proposed procedure. Pre-O2, smooth RSI induction, 25mg propofol, 8mg etomidate, 100mg succh, atraumatic intubation mac 3 DL x1, grade 1, 7.5 ett, +etco2, bbs, sao2 100%, secured at 22cm. teeth intact.

## 2020-07-28 NOTE — PROVIDER CONTACT NOTE (OTHER) - REASON
Pt. rectal temp 102.0 this morning home PT, assistance, use of her RW vs subacute rehab depending on level of assistance available at home

## 2020-08-04 NOTE — PROCEDURE NOTE - NSTIMEOUT_GEN_A_CORE
Patient's first and last name, , procedure, and correct site confirmed prior to the start of procedure.
IV pump

## 2020-08-31 NOTE — PROGRESS NOTE ADULT - ASSESSMENT
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  In cases where prednisone use is prolonged, patients should be monitored with blood pressure checks, serum glucose levels and an eye exam.  Additionally, the patient may need to be placed on GI prophylaxis, PCP prophylaxis, and calcium and vitamin D supplementation and/or a bisphosphonate.  The patient verbalized understanding of the proper use and the possible adverse effects of prednisone.  All of the patient's questions and concerns were addressed.

## 2020-10-24 NOTE — DISCHARGE NOTE NURSING/CASE MANAGEMENT/SOCIAL WORK - NSDCPEPTCAREGIVEDUMATLIST _GEN_ALL_CORE
Influenza Vaccination Risks/benefits discussed with patient/surrogate/Vaccine Information Sheet (VIS) provided-VIS date: 8/15/19

## 2020-11-11 NOTE — PROVIDER CONTACT NOTE (OTHER) - ACTION/TREATMENT ORDERED:
Risk and benefits explained to the patient and family members Mart-1 - Positive Histology Text: MART-1 staining demonstrates areas of higher density and clustering of melanocytes with Pagetoid spread upwards within the epidermis. The surgical margins are positive for tumor cells.
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
## 2021-01-01 NOTE — PROGRESS NOTE ADULT - ASSESSMENT
Dementia.  Looks and feels better since my last encounter.  Complex pharmacological regimen but  is aware of risks.    Recommend  Monitor respirations and QTc.  Discussed with  and NP.    Irvin Reyes M.D.  Psychiatry  [phone] Check here if all serologies below were negative, non-reactive or immune. Otherwise select appropriate status.

## 2021-02-12 NOTE — DIETITIAN INITIAL EVALUATION ADULT. - PROBLEM SELECTOR PLAN 4
Additional Safety/Bands: Presumed asthma--will resume patient's Prednisone.  Duoneb around the clock.   Will assume aspiration risk.

## 2021-02-13 NOTE — PATIENT PROFILE ADULT - FLU SEASON?
generally healthy pt here for covid swab, asymptomatic, well appearing, no dyspnea.  covid pcr swab sent.  given general covid dc instructions/info on how to obtain results
Yes...

## 2021-03-02 NOTE — CONSULT NOTE ADULT - CONSULT REQUESTED DATE/TIME
08-Mar-2018 Area H Indication Text: Tumors in this location are included in Area H (eyelids, eyebrows, nose, lips, chin, ear, pre-auricular, post-auricular, temple, genitalia, hands, feet, ankles and areola).  Tissue conservation is critical in these anatomic locations.

## 2021-04-08 NOTE — PROVIDER CONTACT NOTE (OTHER) - ACTION/TREATMENT ORDERED:
CSS~   Please notifiy pt that letter is done and fax to number listed below as instructed.    Thanks!    Kiarra Shipley RN     tylenol via peg, cultures in am.

## 2021-04-14 NOTE — H&P ADULT - PROBLEM SELECTOR PLAN 8
No
Pt has asthma and is on chronic steroids.  Pt start duonebs q4h standing for now, due to mild respiratory distress  Continue home dose of prednisone 7.5mg PO qd  Continue tiotropium 18mcg qd

## 2021-05-05 NOTE — PROGRESS NOTE ADULT - PROBLEM SELECTOR PLAN 2
continue meds as prescribe  it is helping with her current state and has bee stable
Hide Additional Size Dimension?: No
Hemostasis: Drysol
Silver Nitrate Text: The wound bed was treated with silver nitrate after the biopsy was performed.
continue abx   follow up abx
continue abx id on board
continue budesonide
continue iv abx
contiue budenoside  monitor metabolic panel
currently on budesonide  with history of asthma as well
has been improved    will monitor clinically  monitor
has been slowly improving will monitor clinically
monitor clinically
on iv abx   follow cbc cmp   id on board
using medical marijuana and helping her
Size Of Lesion In Cm: 0
Detail Level: Detailed
Billing Type: Third-Party Bill
Electrodesiccation And Curettage Text: The wound bed was treated with electrodesiccation and curettage after the biopsy was performed.
contine abx   id following
Biopsy Method: Personna blade
Wound Care: Petrolatum
Anesthesia Type: 1% lidocaine with 1:100,000 epinephrine and a 1:10 solution of 8.4% sodium bicarbonate
Type Of Destruction Used: Curettage
Electrodesiccation Text: The wound bed was treated with electrodesiccation after the biopsy was performed.
Depth Of Biopsy: dermis
Consent: Written consent was obtained and risks were reviewed including but not limited to scarring, infection, bleeding, scabbing, incomplete removal, nerve damage and allergy to anesthesia.
Cryotherapy Text: The wound bed was treated with cryotherapy after the biopsy was performed.
Notification Instructions: Patient will be notified of biopsy results. However, patient instructed to call the office if not contacted within 2 weeks.
Dressing: Band-Aid
Curettage Text: The wound bed was treated with curettage after the biopsy was performed.
Information: Selecting Yes will display possible errors in your note based on the variables you have selected. This validation is only offered as a suggestion for you. PLEASE NOTE THAT THE VALIDATION TEXT WILL BE REMOVED WHEN YOU FINALIZE YOUR NOTE. IF YOU WANT TO FAX A PRELIMINARY NOTE YOU WILL NEED TO TOGGLE THIS TO 'NO' IF YOU DO NOT WANT IT IN YOUR FAXED NOTE.
Anesthesia Volume In Cc (Will Not Render If 0): 0.3
Was A Bandage Applied: Yes
Post-Care Instructions: I reviewed with the patient in detail post-care instructions. Patient is to keep the biopsy site dry overnight, and then apply Vaseline twice daily until healed. Patient may apply hydrogen peroxide soaks to remove any crusting.
Biopsy Type: H and E

## 2021-05-15 NOTE — DIETITIAN INITIAL EVALUATION ADULT. - NS AS NUTRI INTERV ENTERAL NUTRITION
Pt in ED for L arm shaking earlier tonight. Pt was unable to open fingers. Arm is currently still, pt has full control of extremities. Home blood glucose 327. Bilateral ankle swelling. Denies pain at this time. VAN Negative.  Multiple syncopal episodes over the past 3 weeks, pt has been worked up by PCP for these. Awaiting MRI and neurology consult.  
As medically feasible, continue to provide 948ml total volume of Glucerna 1.2 daily (would provide only during waking hours: 8am, 12pm, 4pm, and 4pm).

## 2021-05-30 NOTE — CONSULT NOTE ADULT - CONSULT REASON
05/30/21 1435 05/30/21 1437 05/30/21 1439   RT Walking Oximetry   Stage Resting (Room Air) During Walk (Room Air) Resting (on O2)   SpO2 94 % 96 % 94 %    bpm 103 bpm 93 bpm   Rate of Dyspnea 0 0 0   O2 Device None (Room air) None (Room air) None (Room air)   O2 Flow Rate (L/min) 0 l/min 0 l/min 0 l/min   FIO2 (%) 21 % 21 % 21 %   Walk/Assistive Device  --  Ambulation Ambulation      05/30/21 1442   RT Walking Oximetry   Stage After Walk   SpO2 96 %    bpm   Rate of Dyspnea 0   O2 Device None (Room air)   O2 Flow Rate (L/min) 0 l/min   FIO2 (%) 21 %   Walk/Assistive Device  -- R distal femur fx
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
## 2021-06-28 NOTE — PROVIDER CONTACT NOTE (OTHER) - BACKGROUND
Virtual Rooming ODALIS Wilkinson      1. Are you taking your medications prescribed by this clinic as instructed?  Yes    2. Do you have any questions or concerns about your medications?  No          On a scale from 0-10:    1. How bad has your average pain been over the last week? 8    2.  How much has your pain interfered with your emotional state? 6    3.I see you indicated that your pain has been interfering with your mood. Is it causing sadness or depression? Yes  Frustration No    3. How much has it interfered with your general activity? 9    Ht: 5'5\"      Wt: 193\"      Medications reviewed?  Yes    Allergies reviewed?   Yes    Any new allergies?  No              Admitted for sepsis

## 2021-07-08 NOTE — PROGRESS NOTE ADULT - ASSESSMENT
Patient understands condition, prognosis and need for follow up care. ASSESSMENT:  68 year old female presenting with septic shock from MRSA bacteremia secondary to infected right hemiarthroplasty hardware s/p right hip I&D, explantation of right hemiarthroplasty, placement of antibiotic spacer, and right femur ORIF.    PLAN:    Neuro: acute post-op pain, anxiety, depression, dementia  - Monitor mental status.  - Home regimen of Zoloft, Seroquel, Remeron, and melatonin for agitation related to her dementia. Patient takes clonazepam at home. Would discontinue Ativan and restart home clonazepam for anxiety.  - Pain control with Tylenol, oxycodone, and Dilaudid.    Resp: asthma, atelectasis  - Monitor pulse oximeter.  - Out of bed to chair, incentive spirometry, and aggressive chest PT to prevent atelectasis and facilitate secretion mobilization.  - Home prednisone 10 mg daily, Pulmicort, and Duoneb for asthma.    CV: distributive shock (resolved)  - Monitor vital signs.    GI: no acute issues  - NPO with tube feeds. Will advance Jevity to goal of 45 mL/hr.  - Famotidine for GERD.  - Bowel regimen with Colace & senna.    Renal: CKD stage 2, hyponatremia  - Monitor I&Os.  - Monitor electrolytes and replete as necessary.  - Assess need for diuresis.    Heme: no acute issues  - Lovenox for VTE prophylaxis.    ID: MRSA bacteremia, infected right hip s/p I&D, explantation of hardware, & placement of antibiotic spacer  - Monitor WBC, temperature, and procalcitonin.  - Follow up OR and repeat blood cultures. Reculture until patient is no longer bacteremic. Blood cultures from 8/6/2018 currently no growth to date.  - Daptomycin with rifampin for MRSA bacteremia. Appreciate ID recommendations.  - Awaiting PICC placement for long-term IV antibiotics.    Endo: hyperglycemia  - Monitor fingersticks q4hrs for hypoglycemia.    Disposition:  - Full code.  - Will remain in SICU for respiratory monitoring.    Cynthia Wilkins PA-C    [id number] ASSESSMENT:  68 year old female presenting with septic shock from MRSA bacteremia secondary to infected right hemiarthroplasty hardware s/p right hip I&D, explantation of right hemiarthroplasty, placement of antibiotic spacer, and right femur ORIF.    PLAN:    Neuro: acute post-op pain, anxiety, depression, dementia  - Monitor mental status.  - Home regimen of Klonopin, Zoloft, Seroquel, Remeron, and melatonin for agitation related to her dementia.  - Pain control with Tylenol, oxycodone, and Dilaudid.    Resp: asthma, atelectasis  - Monitor pulse oximeter.  - Out of bed to chair, incentive spirometry, and aggressive chest PT to prevent atelectasis and facilitate secretion mobilization.  - Home prednisone 10 mg daily, Pulmicort, and Duoneb for asthma.    CV: distributive shock (resolved)  - Monitor vital signs.    GI: no acute issues  - NPO with Jevity at goal of 45 mL/hr.  - Famotidine for GERD.  - Bowel regimen with Colace & senna.    Renal: CKD stage 2, hyponatremia  - Monitor I&Os.  - Monitor electrolytes and replete as necessary.  - Assess need for diuresis.    Heme: no acute issues  - Lovenox for VTE prophylaxis.    ID: MRSA bacteremia, infected right hip s/p I&D, explantation of hardware, & placement of antibiotic spacer, UTI  - Monitor WBC, temperature, and procalcitonin.  - Follow up urine and blood cultures. Blood cultures from 8/6/2018 with no growth of MRSA.  - Daptomycin with rifampin for MRSA bacteremia. Appreciate ID recommendations.  - Awaiting PICC placement for long-term IV antibiotics.    Endo: hyperglycemia  - Monitor fingersticks q4hrs for hypoglycemia.    Disposition:  - Full code.  - Will remain in SICU for respiratory monitoring.    Cynthia Wilkins PA-C    [id number]

## 2021-08-03 NOTE — PROVIDER CONTACT NOTE (OTHER) - ASSESSMENT
This is a recent snapshot of the patient's [facility] medical record.  For current drug dose and complete information and questions, call [phone] or In Basket pool, [facility] home infusion ([id number])  CSN Number:  [id number]       VSS. No c/o pain or SOB. Pt sleeping in bed.

## 2021-09-10 NOTE — PROGRESS NOTE ADULT - PROBLEM SELECTOR PLAN 7
s/p prbc  and f/u hb/ cht closely   no signs of active bleeding at present
s/p prbc  and f/u hb/ cht closely   no signs of active bleeding at present
monitor closely
s/p prbc  and f/u hb/ cht closely   no signs of active bleeding at present
transfuse and f/u hb/ cht closley   no signs of active bleeding at present
no

## 2021-09-23 NOTE — PROVIDER CONTACT NOTE (OTHER) - RECOMMENDATIONS
?
Come assess pt? Tele?
Nebulizer treament
Risks and benefits explained to the patient and family members
Suction as needed
administer prn tylenol. cont to monitor pt.
administer tylenol suppository 650mg
already administered the PRN tylenols suppository
call gastro, cont to follow up and monitor pt.
check rectal temp
cont to monitor pt
metroprolol?
metroprolol?
np came and inspected the site. and aspirated the peg tube/. aspiration was not/bloody or cofeeground. ordered to continue to monitor
stop feeds, have gastro assess pt,  cont to monitor pt.
tylenol 650 mg supposity
tylenol IV, BC, monitor vs
administer tylenol, have gastro assess pt, cont to monitor pt.
Parents

## 2021-09-24 NOTE — DISCHARGE NOTE NURSING/CASE MANAGEMENT/SOCIAL WORK - NSDCPEEMAIL_GEN_ALL_CORE
[facility] Control email [email or web address]
I personally saw the patient with the PA, and completed the key components of the history and physical exam. I then discussed the management plan with the PA.   agree with procedure as docuemntyed

## 2021-11-10 NOTE — PATIENT PROFILE ADULT - NSASFALLNEEDSASSIST_GEN_A_NUR
yes V-Y Flap Text: The defect edges were debeveled with a #15 scalpel blade.  Given the location of the defect, shape of the defect and the proximity to free margins a V-Y flap was deemed most appropriate.  Using a sterile surgical marker, an appropriate advancement flap was drawn incorporating the defect and placing the expected incisions within the relaxed skin tension lines where possible.    The area thus outlined was incised deep to adipose tissue with a #15 scalpel blade.  The skin margins were undermined to an appropriate distance in all directions utilizing iris scissors.

## 2021-11-10 NOTE — ASU DISCHARGE PLAN (ADULT/PEDIATRIC). - DISCHARGE DATE
22-Sep-2017 Elidel Counseling: Patient may experience a mild burning sensation during topical application. Elidel is not approved in children less than 2 years of age. There have been case reports of hematologic and skin malignancies in patients using topical calcineurin inhibitors although causality is questionable.

## 2021-11-29 NOTE — PROGRESS NOTE ADULT - SUBJECTIVE AND OBJECTIVE BOX
---___---___---___---___---___---___ ---___---___---___---___---___---___---___---___---___---                    <<<  M E D I C A L   A T T E N D I N G    F O L L O W    U P   N O T E  >>>    still in icu vitals stable . source of infection not yet found    ---___---___---___---___---___---      <<<  MEDICATIONS:  >>>    MEDICATIONS  (STANDING):  ALBUTerol/ipratropium for Nebulization 3 milliLiter(s) Nebulizer every 6 hours  buDESOnide   0.5 milliGRAM(s) Respule 0.5 milliGRAM(s) Inhalation every 12 hours  cefepime   IVPB 1000 milliGRAM(s) IV Intermittent every 24 hours  cefepime   IVPB      chlorhexidine 4% Liquid 1 Application(s) Topical <User Schedule>  clonazePAM Tablet 2 milliGRAM(s) Oral <User Schedule>  dextrose 5% + lactated ringers. 1000 milliLiter(s) (100 mL/Hr) IV Continuous <Continuous>  enoxaparin Injectable 40 milliGRAM(s) SubCutaneous daily  erythromycin    ethylsuccinate Suspension 40 mG/mL 500 milliGRAM(s) Oral every 8 hours  insulin lispro (HumaLOG) corrective regimen sliding scale   SubCutaneous every 6 hours  lidocaine   Patch 1 Patch Transdermal every 24 hours  methylPREDNISolone sodium succinate Injectable 8 milliGRAM(s) IV Push every 24 hours  metroNIDAZOLE  IVPB      metroNIDAZOLE  IVPB 500 milliGRAM(s) IV Intermittent every 8 hours  mirtazapine 45 milliGRAM(s) Oral <User Schedule>  pantoprazole  Injectable 40 milliGRAM(s) IV Push every 24 hours  QUEtiapine 25 milliGRAM(s) Oral <User Schedule>  sertraline 50 milliGRAM(s) Oral daily  vancomycin  IVPB 500 milliGRAM(s) IV Intermittent every 24 hours      MEDICATIONS  (PRN):  nystatin Powder 1 Application(s) Topical two times a day PRN rash/itchiness       ---___---___---___---___---___---     <<<REVIEW OF SYSTEM: >>>    GEN: no fever, no chills, no pain  RESP: no SOB, no cough, no sputum  CVS: no chest pain, no palpitations, no edema  GI: no abdominal pain, no nausea, no vomiting, no constipation, no diarrhea  : no dysurea, no frequency  NEURO: no headache, no dizziness  PSYCH: no depression, not anxious  Derm : no itching, no rash     ---___---___---___---___---___---          <<<  VITAL SIGNS: >>>    T(F): 98.3 (18 @ 19:00), Max: 99.1 (18 @ 23:00)  HR: 92 (18 @ 19:00) (90 - 118)  BP: --  RR: 17 (18 @ 19:00) (17 - 44)  SpO2: 100% (18 @ 19:00) (83% - 100%)  Wt(kg): --  CAPILLARY BLOOD GLUCOSE      POCT Blood Glucose.: 97 mg/dL (29 Aug 2018 17:09)    I&O's Summary    28 Aug 2018 07:  -  29 Aug 2018 07:00  --------------------------------------------------------  IN: 3246.6 mL / OUT: 1755 mL / NET: 1491.6 mL    29 Aug 2018 07:  -  29 Aug 2018 20:10  --------------------------------------------------------  IN: 1440 mL / OUT: 700 mL / NET: 740 mL         ---___---___---___---___---___---                       PHYSICAL EXAM:    GEN: A&O X 2 , NAD , comfortable  HEENT: NCAT, PERRL, MMM, no scleral icterus, hearing intact  NECK: Supple, No JVD  CVS: S1S2 , regular , No M/R/G appreciated  PULM: CTA B/L,  no W/R/R appreciated  ABD.: soft. non tender, non distended,  bowel sounds present peg to gravity   Extrem: intact pulses , no edema noted  Derm: No rash or ecchymosis noted  PSYCH: normal mood, no depression, not anxious     ---___---___---___---___---___---     <<<  LAB AND IMAGING: >>>                          9.1    12.0  )-----------( 279      ( 29 Aug 2018 03:01 )             28.5               08-29    141  |  109<H>  |  20  ----------------------------<  176<H>  4.3   |  18<L>  |  1.05    Ca    8.4      29 Aug 2018 03:01  Phos  3.8     08-  Mg     2.3     08-    TPro  5.1<L>  /  Alb  1.9<L>  /  TBili  0.4  /  DBili  0.1  /  AST  15  /  ALT  9<L>  /  AlkPhos  149<H>  08-29    PT/INR - ( 29 Aug 2018 03:01 )   PT: 13.4 sec;   INR: 1.22 ratio         PTT - ( 29 Aug 2018 03:01 )  PTT:38.6 sec       Urinalysis Basic - ( 28 Aug 2018 03:39 )    Color: Yellow / Appearance: Clear / S.014 / pH: x  Gluc: x / Ketone: Negative  / Bili: Negative / Urobili: Negative   Blood: x / Protein: 30 mg/dL / Nitrite: Negative   Leuk Esterase: Negative / RBC: 10-25 /HPF / WBC 3-5 /HPF   Sq Epi: x / Non Sq Epi: x / Bacteria: Few /HPF                ABG - ( 29 Aug 2018 02:32 )  pH, Arterial: 7.48  pH, Blood: x     /  pCO2: 28    /  pO2: 83    / HCO3: 21    / Base Excess: -1.4  /  SaO2: 98                      [All pertinent / recent available Imaging reports and other labs reviewed]     ---___---___---___---___---___---___ ---___---___---___---___---           <<<  A S S E S S M E N T   A N D   P L A N :  >>>          -GI/DVT Prophylaxis.       >>______________________<<      Deniz Bolden .         phone   [phone] Calm

## 2022-01-06 NOTE — PROGRESS NOTE ADULT - PROBLEM SELECTOR PLAN 7
Called and spoke with Mckenna Stone regarding dose of Garlin Katie- will clarify with Dr. Drea Wade on Monday and send appropriate script to [facility]. Mckenna Stone states will start next cycle on 1/18.
on insulin
peg feeds on going

## 2022-01-26 NOTE — PROGRESS NOTE ADULT - PROBLEM SELECTOR PROBLEM 3
well developed, well nourished , in no acute distress , ambulating without difficulty , normal communication ability Oropharyngeal dysphagia

## 2022-02-07 NOTE — CONSULT NOTE ADULT - ATTENDING COMMENTS
Subjective:      Patient ID: Alfredo Farley is a 23 y.o. male    HPI: Acute for depression    TELEHEALTH EVALUATION -- Audio/Visual (During YBBAH-68 public health emergency)    Patient identification was verified at the start of the visit: Yes    Services were provided through a video synchronous discussion virtually to substitute for in-person clinic visit. Patient and provider were located at their individual homes. Patient has requested an audio/video evaluation for the following concern(s):    Chief Complaint   Patient presents with    Insomnia       Was increased on Zoloft 200 mg and Wellbutrin  mg was added for MDD and anxiety. Benefit seen. More relaxed. Increase energy. Doing well. Onset of 1 week with waking up at night. Wake up sweating and headaches. Hard to get back to sleep. Snores. Mom mention he gasps for air at times. Nasal congestion at night. Sleeps on side. Wt Readings from Last 3 Encounters:   11/25/21 290 lb (131.5 kg) (>99 %, Z= 2.95)*   08/09/21 313 lb (142 kg) (>99 %, Z= 3.20)*   07/16/21 (!) 316 lb 3.2 oz (143.4 kg) (>99 %, Z= 3.23)*     * Growth percentiles are based on CDC (Boys, 2-20 Years) data. Increase depression and anxiety. 1-2 months of building and coming on. Increase stress. Going to [employer] and staying on campus. Overwhelmed a lot. Mind racing. Feels multiple things building up put him in a more depressed and anxious state. Patient Active Problem List   Diagnosis    BHUPINDER (generalized anxiety disorder)    ADHD (attention deficit hyperactivity disorder), combined type    Seizures ([address]    Migraines    Benign rolandic epilepsy ([address]    Chronic headaches    Excessive somnolence disorder    Localization-related epilepsy ([address]    Mild persistent asthma without complication       Review of Systems   Constitutional: Negative for chills and fever. HENT: Negative.     Respiratory: Negative for cough and shortness of breath. Cardiovascular: Negative for chest pain. Gastrointestinal: Negative for abdominal pain and nausea. Skin: Negative for rash. Neurological: Negative for dizziness, light-headedness and headaches. Psychiatric/Behavioral: Positive for sleep disturbance. Negative for dysphoric mood. The patient is not nervous/anxious. Objective:   Physical Exam  Constitutional:       General: He is not in acute distress. Appearance: He is not ill-appearing. Pulmonary:      Effort: Pulmonary effort is normal. No respiratory distress. Neurological:      Mental Status: He is alert and oriented to person, place, and time. Psychiatric:         Mood and Affect: Mood normal.         Behavior: Behavior normal.         Assessment:       Diagnosis Orders   1. Moderate episode of recurrent major depressive disorder (HCC)  buPROPion (WELLBUTRIN XL) 150 MG extended release tablet   2. Insomnia due to medical condition  hydrOXYzine (ATARAX) 25 MG tablet       Plan:     Benefit seen with medication change for mood. Continue Zoloft 200 mg and Wellbutrin 150 mg XL  Sleep issue related to Sleep Apnea? Breath Rite Strips  Weight loss discussed  Call with update in to pursue sleep study         Dolph December, was evaluated through a synchronous (real-time) audio-video encounter. The patient (or guardian if applicable) is aware that this is a billable service, which includes applicable co-pays. This Virtual Visit was conducted with patient's (and/or legal guardian's) consent. The visit was conducted pursuant to the emergency declaration under the [address] [place] [address], [address] [place] [address] [facility] authority and the [employer] General Act. Patient identification was verified, and a caregiver was present when appropriate. The patient was located in a state where the provider was licensed to provide care.      --EVGENY Wolf CNP on 2/7/2022 at 3:09 PM    An electronic signature was used to authenticate this note.      2/7/2022 69 F w/ advanced dementia with severe contractures, large decubitus ulcer,  chronic mrsa infection, chronic prednisone? . Consult for Hypotenstion.  Agree with above exam, assesment and plan.    ON POCUS pt had normal LVF, Indeterminate IVC, patchy lumpy bumpy Blines and focal posterior left and lower right consolidations.   - agree with expanding abx, for pna, can start midodrine 10q8, Stress dose steroids, and 500cc bolus.    - Uses smaller BP cuff as well if available.   - reconsult PRN.   Currently does not require MICU level of care.

## 2022-02-22 NOTE — PROCEDURE NOTE - NSPOSTCAREGUIDE_GEN_A_CORE
Assessment/Plan:    Hypercholesteremia  Patient did not do the lab work to monitor his lipid panel  Discussed with the patient today and he will get the lab work done  He is currently on Lipitor 10 mg daily and is taking it as prescribed     Hypertension  Patient was recently prescribed 3 weeks ago Norvasc 2 5 mg daily  His blood pressure is currently well controlled         Problem List Items Addressed This Visit        Cardiovascular and Mediastinum    Hypertension - Primary     Patient was recently prescribed 3 weeks ago Norvasc 2 5 mg daily  His blood pressure is currently well controlled         Relevant Medications    amLODIPine (NORVASC) 2 5 mg tablet    Other Relevant Orders    Comprehensive metabolic panel    Comprehensive metabolic panel      Other Visit Diagnoses     Hematuria, unspecified type        Relevant Orders    PSA Total (Reflex To Free)            Subjective:      Patient ID: Carlotta Espinal is a 50 y o  male  Patient has no complaints today  He states that on his last visit he had an episode of bloody discharge from his penis after having sexual intercourse with his wife  He was concerned and was supposed to complete lab work for this  He has a busy schedule and was unable to get the lab work done  He states that the bloody discharge only happened that one time and has not happened again  He is interested in getting the lab work done now  He states about 3 weeks ago he was started on Norvasc by his specialist for his blood pressure and has been taking daily as prescribed with no issues  The patient is a  and states he does not exercise at all and his meals are inconsistent secondary to his hours and breaks as a   He states he typically will eat a piece of bread, butter, oatmeal or yogurt with a banana for breakfast  He will sometimes skip lunch due to work or have a banana   For dinner he will have a sandwich, but sometimes he gets home late and will have a dinner around
11PM before bedtime  The following portions of the patient's history were reviewed and updated as appropriate: allergies, current medications, past family history, past medical history, past social history, past surgical history and problem list     Review of Systems   Constitutional: Negative for chills and fever  HENT: Negative for ear pain and sore throat  Eyes: Negative for pain and visual disturbance  Respiratory: Negative for cough and shortness of breath  Cardiovascular: Negative for chest pain and palpitations  Gastrointestinal: Negative for abdominal pain, diarrhea, nausea and vomiting  Genitourinary: Negative for dysuria and hematuria  Musculoskeletal: Negative for arthralgias and back pain  Skin: Negative for color change and rash  Neurological: Positive for headaches (mild)  Negative for seizures and syncope  All other systems reviewed and are negative  Objective:      /82 (BP Location: Right arm, Patient Position: Sitting, Cuff Size: Standard)   Pulse 63   Temp 98 3 °F (36 8 °C) (Tympanic)   Ht 6' 1 62" (1 87 m)   Wt 88 9 kg (196 lb)   SpO2 98%   BMI 25 42 kg/m²          Physical Exam  Constitutional:       General: He is not in acute distress  Appearance: Normal appearance  He is not ill-appearing  HENT:      Head: Normocephalic and atraumatic  Mouth/Throat:      Mouth: Mucous membranes are moist    Cardiovascular:      Rate and Rhythm: Normal rate and regular rhythm  Pulses: Normal pulses  Heart sounds: Normal heart sounds  Pulmonary:      Effort: Pulmonary effort is normal       Breath sounds: Normal breath sounds  Abdominal:      General: Abdomen is flat  Bowel sounds are normal  There is no distension  Palpations: Abdomen is soft  Tenderness: There is no abdominal tenderness  There is no guarding  Genitourinary:     Penis: No discharge  Musculoskeletal:      Right lower leg: No edema        Left lower leg: No
edema    Skin:     General: Skin is warm and dry  Neurological:      Mental Status: He is alert and oriented to person, place, and time 
Care for catheter as per unit/ICU protocols
Keep the cast/splint/dressing clean and dry
Care for catheter as per unit/ICU protocols
Verbal/written post procedure instructions were given to patient/caregiver/Keep the cast/splint/dressing clean and dry/Instructed patient/caregiver to follow-up with primary care physician/Instructed patient/caregiver regarding signs and symptoms of infection/Care for catheter as per unit/ICU protocols
Care for catheter as per unit/ICU protocols

## 2022-02-28 NOTE — PROGRESS NOTE ADULT - PROBLEM SELECTOR PLAN 4
[Nutrition/ Exercise/ Weight Management] : nutrition, exercise, weight management
on insulin and glucose level monitored   endocrinology on board
[Vitamins/Supplements] : vitamins/supplements
[Medication Management] : medication management
id following   continue to monitor off abx
ortho following and changed hardware
id on board   awaiting dc
cardiology following
continue zoloft remeron and  klonopin and seroquel
manage conservatively
on riss insulin
on seroquel   remeron zolof   has been stable
on seroquel, remeron, klonopin
restart feeds at 10 cc/hr   gi on board
abx course done
as above
as above
awaiting surgery 8/3/18
awaiting surgery 8/3/18   orthopedic following
care per primary team noted
continue insulin   endocrinology  consult
continue iv abx
continue iv antibiotics as well   id on board   likely will finish course in hospital
continue on minocyclin   needs clearance form id for dc
continue peg feeds
continue prednisone riss for elevated glucose
continue vanco   urine culture and blood culture pending
continue wound care
finished corse of abx   id on board  need repeat cbc if still elevated will pan culture and id to be reconsulted
has peg   mbs scheduled for monday
has peg . VEENA slated for monday the 17th
id on board   has completed abx course
on inulin by endocrinology
ortho to follow
orthopedics folowing   continue pt as per ortho guidelines
pain control   pt as scheduled
pain control on fentanyl  and morphine fopr breakthrough
psychiatry following   on klonopin
repeat speech and swallow to see if peg needed
unable to keep head up
wound healing well

## 2022-04-05 NOTE — CHART NOTE - NSCHARTNOTESELECT_GEN_ALL_CORE
Nutrition Services Admission medication reconciliation POD1 Transition of Care video discharge education - medication calendar given to patient Pharmacy fill confirmed.

## 2022-04-18 NOTE — PATIENT PROFILE ADULT - VISION (WITH CORRECTIVE LENSES IF THE PATIENT USUALLY WEARS THEM):
----- Message from Kenia Muniz sent at 4/18/2022  4:19 PM CDT -----  Type:  Patient Returning Call    Who Called:pt  Who Left Message for Patient:office  Does the patient know what this is regarding?:cellulites  Would the patient rather a call back or a response via [facility]? call  Best Call Back Number:[phone]  Additional Information:           
Called pt no answer, left message.   
Normal vision: sees adequately in most situations; can see medication labels, newsprint

## 2022-05-10 NOTE — PROGRESS NOTE ADULT - MINUTES
20
45
62
62
35
62
Otezla Counseling: The side effects of Otezla were discussed with the patient, including but not limited to worsening or new depression, weight loss, diarrhea, nausea, upper respiratory tract infection, and headache. Patient instructed to call the office should any adverse effect occur.  The patient verbalized understanding of the proper use and possible adverse effects of Otezla.  All the patient's questions and concerns were addressed.

## 2022-06-23 NOTE — PHYSICAL THERAPY INITIAL EVALUATION ADULT - ONSET DATE, REHAB EVAL
Pt arrived to Interventional Radiology room 189 via stretcher for pre-yttrium.  Plan of care reviewed with patient, patient verbalized understanding.  Name verified using two identifiers.  Allergies verified.  Labs, orders and consent reviewed on chart.  Pt oriented to unit and staff.  See flow sheet for documentation, monitoring and medication administration. Will continue to monitor.       22-Jun-2018

## 2022-06-24 NOTE — PROGRESS NOTE ADULT - ASSESSMENT
67 yo f with multiple medical conditions admitted 2/26 with fever to 103.  She has spent the greater past of past 9 months in the hospital.  She had difficult to control MRSA bacteremia, finally controlled after RT hip was debrided and explanted.  s/p multiple treatment for pneumonia, stage 4 decubitus ,peg, and chronic gavin.  No obvious primary site for infection .urine culture with enterobacter and VRE, likely colonizers  She also has a chronic DVT and had unexplained fevers x 6 weeks during last hospital stay  1/4 bottles with a coag negative staph, likely a contaminant. CDT is negative  CT: patchy b/l groundglass opacities, no new consolidations, no acute abd pathology.  Her chest CT scan is unchanged c/w 2016, hence no evidence of an acute or subacute infection.  Rt hip is without collection and sacral decubitus is chronic.  We may be confronted with approaching her as a FUO as she did not respond to meropeenem, tigecycline, and is now on empiric dapto and micafungin.  Drug fever has been a concern in past.  S/P repeat cultures past few days, no new positives  Head CT is without acute process  Her fever and leukocytosis are moderating, ? in response to antibiotics  Suggest::  Await repeat cultures  continue meropenem, will likely limit to 7 days, may stop in AM.   Daptomycin, Amikacin and micafungin, all empiric, perhaps 3-5 days of dapto and micafungin  s/p a dose of Zyvox 3/3  d/w pt  at bedside , comfort care has been considered in past, would still be my bias  Consider a palliative care evaluation,goals of care will be important going foward  prognosis seems extremely poor   requests PT,OT, and speech therapy HOB elevated

## 2022-06-27 NOTE — PROGRESS NOTE ADULT - ASSESSMENT
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     most l;ikely this event appears to be related  to peg being dislodged      she probably has a chronic mrsa infection of spacer but I think we can hold vanco for now.  discussed with gi and family   currently on cefepime and flagyl  ( unclear if she is really allergic to meropenem.)  still with low grade fevers: peg replaced yesterday  if she deteriorates; add back vanco to cover the mrsa in her hip  prognosis remains dismal   plan 7 day coruse of urrent therapy  discussed with  Never

## 2022-07-02 NOTE — PHYSICAL THERAPY INITIAL EVALUATION ADULT - MODALITIES TREATMENT COMMENTS
No
Pt s/p sharp debridement at bedside on 9/11, now with R mid-later thigh full thickness wound measuring 6 cm x 1.5cm x1 cm. No purulence, no erythema, no malodor.

## 2022-07-24 NOTE — DISCHARGE NOTE ADULT - THE PATIENT HAS
Labor Progress Note    Patient reports: increased contractions    Vitals:    22 1445   BP: 138/81   Pulse: 88   Resp:    Temp:        Fetal Heart Tones: Category 1    Contractions every 2-7 minute(s).    Cervix 4 cm, 70% effaced, -2 station, Mann balloon removed    Impression: Darya Jahveri is a 32 year old  female at 36w6d here for IOL for preeclampsia with severe features. Did have 2 severe range BPs and was treated with 20 mg IVP labetalol, and they came down to mild range. HELLP labs repeated and remain normal. Started on magnesium sulfate and tolerating well. Mann now removed and will begin pitocin.    Plan: reassuring fetal status; continue current management and inadequate contractions; will start pitocin    Anaid Teran MD     no difficulties

## 2022-08-23 NOTE — PROGRESS NOTE ADULT - ASSESSMENT
Patient is 69 year-old woman with advanced dementia and functional quadriplegia presents with fevers, leukocytosis in the setting of PEG dislodgement. Patient with acute kidney injury that is likely multifactorial.  Antibiotics per ID.  PEG management and feeds per GI/nutrition.    Appreciate nephrology input: currently off Lasix gtt. Agree with IV fluids as tolerated after fever.    Trend daily labs. Avoid nephrotoxic agents.     Overall prognosis remains grave. Detail Level: Simple Patient Specific Counseling (Will Not Stick From Patient To Patient): Pt would like to start skyrizi. Pt has Type II Diabetes. Pt had tb vaccine as a child.

## 2022-08-25 NOTE — DISCHARGE NOTE ADULT - PLAN OF CARE
Mohs Case Number: [id number] Biopsy Photograph Reviewed: Yes Consent Type: Consent 1 (Standard) Eye Shield Used: No Surgeon Performing Repair (Optional): Dr. Mojica Initial Size Of Lesion: 1.5 X Size Of Lesion In Cm (Optional): 1.1 Number Of Stages: 1 Primary Defect Length In Cm (Final Defect Size - Required For Flaps/Grafts): 1.9 Primary Defect Width In Cm (Final Defect Size - Required For Flaps/Grafts): 1.7 Repair Type: Complex Repair Oculoplastic Surgeon Procedure Text (A): After obtaining clear surgical margins the patient was sent to oculoplastics for surgical repair.  The patient understands they will receive post-surgical care and follow-up from the referring physician's office. Otolaryngologist Procedure Text (A): After obtaining clear surgical margins the patient was sent to otolaryngology for surgical repair.  The patient understands they will receive post-surgical care and follow-up from the referring physician's office. Plastic Surgeon Procedure Text (A): After obtaining clear surgical margins the patient was sent to plastics for surgical repair.  The patient understands they will receive post-surgical care and follow-up from the referring physician's office. Mid-Level Procedure Text (A): After obtaining clear surgical margins the patient was sent to a mid-level provider for surgical repair.  The patient understands they will receive post-surgical care and follow-up from the mid-level provider. Provider Procedure Text (A): After obtaining clear surgical margins the defect was repaired by another provider. Asc Procedure Text (A): After obtaining clear surgical margins the patient was sent to an ASC for surgical repair.  The patient understands they will receive post-surgical care and follow-up from the ASC physician. Simple / Intermediate / Complex Repair - Final Wound Length In Cm: 3.2 Suturegard Retention Suture: 2-0 Nylon Retention Suture Bite Size: 3 mm Length To Time In Minutes Device Was In Place: 10 Distance Of Undermining In Cm (Required): 2 Undermining Type: One Wound Edge Debridement Text: The wound edges were debrided prior to proceeding with the closure to facilitate wound healing. Helical Rim Text: The closure involved the helical rim. Vermilion Border Text: The closure involved the vermilion border. Nostril Rim Text: The closure involved the nostril rim. Retention Suture Text: Retention sutures were placed to support the closure and prevent dehiscence. Secondary Defect Length In Cm (Required For Flaps): 0 Location Indication Override (Is Already Calculated Based On Selected Body Location): Area M Area H Indication Text: Tumors in this location are included in Area H (eyelids, eyebrows, nose, lips, chin, ear, pre-auricular, post-auricular, temple, genitalia, hands, feet, ankles and areola).  Tissue conservation is critical in these anatomic locations. Area M Indication Text: Tumors in this location are included in Area M (cheek, forehead, scalp, neck, jawline and pretibial skin).  Mohs surgery is indicated for tumors in these anatomic locations. Area L Indication Text: Tumors in this location are included in Area L (trunk and extremities).  Mohs surgery is indicated for larger tumors, or tumors with aggressive histologic features, in these anatomic locations. Tumor Debulked?: curette Depth Of Tumor Invasion (For Histology): tumor not visualized (deep and peripheral margins are clear of tumor) Perineural Invasion (For Histology - Be Specific If Possible): absent Special Stains Stage 1 - Results: Base On Clearance Noted Above Stage 2: Additional Anesthesia Type: 1% lidocaine with epinephrine Staging Info: By selecting yes to the question above you will include information on AJCC 8 tumor staging in your Mohs note. Information on tumor staging will be automatically added for SCCs on the head and neck. AJCC 8 includes tumor size, tumor depth, perineural involvement and bone invasion. Tumor Depth: Less than 6mm from granular layer and no invasion beyond the subcutaneous fat Why Was The Change Made?: Please Select the Appropriate Response Medical Necessity Statement: Based on my medical judgement, Mohs surgery is the most appropriate treatment for this cancer compared to other treatments. Alternatives Discussed Intro (Do Not Add Period): I discussed alternative treatments to Mohs surgery and specifically discussed the risks and benefits of Consent 1/Introductory Paragraph: The rationale for Mohs was explained to the patient and consent was obtained. The risks, benefits and alternatives to therapy were discussed in detail. Specifically, the risks of infection, scarring, bleeding, prolonged wound healing, incomplete removal, allergy to anesthesia, nerve injury and recurrence were addressed. Prior to the procedure, the treatment site was clearly identified and confirmed by the patient. All components of Universal Protocol/PAUSE Rule completed. Consent 2/Introductory Paragraph: Mohs surgery was explained to the patient and consent was obtained. The risks, benefits and alternatives to therapy were discussed in detail. Specifically, the risks of infection, scarring, bleeding, prolonged wound healing, incomplete removal, allergy to anesthesia, nerve injury and recurrence were addressed. Prior to the procedure, the treatment site was clearly identified and confirmed by the patient. All components of Universal Protocol/PAUSE Rule completed. symptom management Consent 3/Introductory Paragraph: I gave the patient a chance to ask questions they had about the procedure.  Following this I explained the Mohs procedure and consent was obtained. The risks, benefits and alternatives to therapy were discussed in detail. Specifically, the risks of infection, scarring, bleeding, prolonged wound healing, incomplete removal, allergy to anesthesia, nerve injury and recurrence were addressed. Prior to the procedure, the treatment site was clearly identified and confirmed by the patient. All components of Universal Protocol/PAUSE Rule completed. Weigh yourself daily.  If you gain 3lbs in 3 days, or 5lbs in a week call your Health Care Provider.  Do not eat or drink foods containing more than 2000mg of salt (sodium) in your diet every day.  Call your Health Care Provider if you have any swelling or increased swelling in your feet, ankles, and/or stomach.  Take all of your medication as directed.  If you become dizzy call your Health Care Provider. Consent (Temporal Branch)/Introductory Paragraph: The rationale for Mohs was explained to the patient and consent was obtained. The risks, benefits and alternatives to therapy were discussed in detail. Specifically, the risks of damage to the temporal branch of the facial nerve, infection, scarring, bleeding, prolonged wound healing, incomplete removal, allergy to anesthesia, and recurrence were addressed. Prior to the procedure, the treatment site was clearly identified and confirmed by the patient. All components of Universal Protocol/PAUSE Rule completed. Consent (Marginal Mandibular)/Introductory Paragraph: The rationale for Mohs was explained to the patient and consent was obtained. The risks, benefits and alternatives to therapy were discussed in detail. Specifically, the risks of damage to the marginal mandibular branch of the facial nerve, infection, scarring, bleeding, prolonged wound healing, incomplete removal, allergy to anesthesia, and recurrence were addressed. Prior to the procedure, the treatment site was clearly identified and confirmed by the patient. All components of Universal Protocol/PAUSE Rule completed. resolved Consent (Spinal Accessory)/Introductory Paragraph: The rationale for Mohs was explained to the patient and consent was obtained. The risks, benefits and alternatives to therapy were discussed in detail. Specifically, the risks of damage to the spinal accessory nerve, infection, scarring, bleeding, prolonged wound healing, incomplete removal, allergy to anesthesia, and recurrence were addressed. Prior to the procedure, the treatment site was clearly identified and confirmed by the patient. All components of Universal Protocol/PAUSE Rule completed. Make appointments to follow up with your physicians Consent (Near Eyelid Margin)/Introductory Paragraph: The rationale for Mohs was explained to the patient and consent was obtained. The risks, benefits and alternatives to therapy were discussed in detail. Specifically, the risks of ectropion or eyelid deformity, infection, scarring, bleeding, prolonged wound healing, incomplete removal, allergy to anesthesia, nerve injury and recurrence were addressed. Prior to the procedure, the treatment site was clearly identified and confirmed by the patient. All components of Universal Protocol/PAUSE Rule completed. Consent (Ear)/Introductory Paragraph: The rationale for Mohs was explained to the patient and consent was obtained. The risks, benefits and alternatives to therapy were discussed in detail. Specifically, the risks of ear deformity, infection, scarring, bleeding, prolonged wound healing, incomplete removal, allergy to anesthesia, nerve injury and recurrence were addressed. Prior to the procedure, the treatment site was clearly identified and confirmed by the patient. All components of Universal Protocol/PAUSE Rule completed. Consent (Nose)/Introductory Paragraph: The rationale for Mohs was explained to the patient and consent was obtained. The risks, benefits and alternatives to therapy were discussed in detail. Specifically, the risks of nasal deformity, changes in the flow of air through the nose, infection, scarring, bleeding, prolonged wound healing, incomplete removal, allergy to anesthesia, nerve injury and recurrence were addressed. Prior to the procedure, the treatment site was clearly identified and confirmed by the patient. All components of Universal Protocol/PAUSE Rule completed. Take your "blood thinners" as prescribed.  Walking is encouraged, increase activity as tolerated.  If you develop new leg pain, swelling, and/or redness contact your healthcare provider.  If you develop new chest pain with difficulty breathing, a rapid heart rate and/or a feeling of passing out call emergency medical services 911. Consent (Lip)/Introductory Paragraph: The rationale for Mohs was explained to the patient and consent was obtained. The risks, benefits and alternatives to therapy were discussed in detail. Specifically, the risks of lip deformity, changes in the oral aperture, infection, scarring, bleeding, prolonged wound healing, incomplete removal, allergy to anesthesia, nerve injury and recurrence were addressed. Prior to the procedure, the treatment site was clearly identified and confirmed by the patient. All components of Universal Protocol/PAUSE Rule completed. Consent (Scalp)/Introductory Paragraph: The rationale for Mohs was explained to the patient and consent was obtained. The risks, benefits and alternatives to therapy were discussed in detail. Specifically, the risks of changes in hair growth pattern secondary to repair, infection, scarring, bleeding, prolonged wound healing, incomplete removal, allergy to anesthesia, nerve injury and recurrence were addressed. Prior to the procedure, the treatment site was clearly identified and confirmed by the patient. All components of Universal Protocol/PAUSE Rule completed. Detail Level: Detailed Postop Diagnosis: same Surgeon: Zeke Mojica MD Anesthesia Type: 0.5% lidocaine, 0.25% Marcaine, 1:200,000 epinephrine and a 1:10 solution of sodium bicarbonate Anesthesia Volume In Cc: 3 Additional Anesthesia Volume In Cc: 6 Hemostasis: Electrodesiccation Estimated Blood Loss (Cc): minimal Repair Anesthesia Method: local infiltration Brow Lift Text: A midfrontal incision was made medially to the defect to allow access to the tissues just superior to the left eyebrow. Following careful dissection inferiorly in a supraperiosteal plane to the level of the left eyebrow, several 3-0 monocryl sutures were used to resuspend the eyebrow orbicularis oculi muscular unit to the superior frontal bone periosteum. This resulted in an appropriate reapproximation of static eyebrow symmetry and correction of the left brow ptosis. Deep Sutures: 4-0 Vicryl Epidermal Sutures: 4-0 Ethilon Epidermal Closure: running Suturegard Intro: Intraoperative tissue expansion was performed, utilizing the SUTUREGARD device, in order to reduce wound tension. Suturegard Body: The suture ends were repeatedly re-tightened and re-clamped to achieve the desired tissue expansion. Hemigard Intro: Due to skin fragility and wound tension, it was decided to use HEMIGARD adhesive retention suture devices to permit a linear closure. The skin was cleaned and dried for a 6cm distance away from the wound. Excessive hair, if present, was removed to allow for adhesion. Hemigard Postcare Instructions: The HEMIGARD strips are to remain completely dry for at least 5-7 days. Donor Site Anesthesia Type: same as repair anesthesia Graft Basting Suture (Optional): 5-0 Fast Absorbing Gut Graft Donor Site Epidermal Sutures (Optional): 5-0 Ethilon Graft Donor Site Bandage (Optional-Leave Blank If You Don't Want In Note): pressure bandage were applied to the donor site. Closure 2 Information: This tab is for additional flaps and grafts, including complex repair and grafts and complex repair and flaps. You can also specify a different location for the additional defect, if the location is the same you do not need to select a new one. We will insert the automated text for the repair you select below just as we do for solitary flaps and grafts. Please note that at this time if you select a location with a different insurance zone you will need to override the ICD10 and CPT if appropriate. Closure 3 Information: This tab is for additional flaps and grafts above and beyond our usual structured repairs.  Please note if you enter information here it will not currently bill and you will need to add the billing information manually. Wound Care: Petrolatum Dressing: pressure dressing Dressing (No Sutures): dry sterile dressing Suture Removal: 7 days Unna Boot Text: An Unna boot was placed to help immobilize the limb and facilitate more rapid healing. Home Suture Removal Text: Patient was provided instructions on removing sutures and will remove their sutures at home.  If they have any questions or difficulties they will call the office. Take your Eliquis as prescribed.  Walking is encouraged, increase activity as tolerated.  If you develop new leg pain, swelling, and/or redness contact your healthcare provider.  If you develop new chest pain with difficulty breathing, a rapid heart rate and/or a feeling of passing out call emergency medical services 911. Post-Care Instructions: I reviewed with the patient in detail post-care instructions. Patient is not to engage in any heavy lifting, exercise, or swimming for the next 14 days. Should the patient develop any fevers, chills, bleeding, severe pain patient will contact the office immediately. Pain Refusal Text: I offered to prescribe pain medication but the patient refused to take this medication. Mauc Instructions: By selecting yes to the question below the MAUC number will be added into the note.  This will be calculated automatically based on the diagnosis chosen, the size entered, the body zone selected (H,M,L) and the specific indications you chose. You will also have the option to override the Mohs AUC if you disagree with the automatically calculated number and this option is found in the Case Summary tab. Where Do You Want The Question To Include Opioid Counseling Located?: Case Summary Tab Eye Protection Verbiage: Before proceeding with the stage, a plastic scleral shield was inserted. The globe was anesthetized with a few drops of 1% lidocaine with 1:100,000 epinephrine. Then, an appropriate sized scleral shield was chosen and coated with lacrilube ointment. The shield was gently inserted and left in place for the duration of each stage. After the stage was completed, the shield was gently removed. Mohs Method Verbiage: An incision at a 45 degree angle following the standard Mohs approach was done and the specimen was harvested as a microscopic controlled layer. Surgeon/Pathologist Verbiage (Will Incorporate Name Of Surgeon From Intro If Not Blank): operated in two distinct and integrated capacities as the surgeon and pathologist. Mohs Histo Method Verbiage: Each section was then chromacoded and processed in the Mohs lab using the Mohs protocol and submitted for frozen section. Subsequent Stages Histo Method Verbiage: Using a similar technique to that described above, a thin layer of tissue was removed from all areas where tumor was visible on the previous stage.  The tissue was again oriented, mapped, dyed, and processed as above. Mohs Rapid Report Verbiage: The area of clinically evident tumor was marked with skin marking ink and appropriately hatched.  The initial incision was made following the Mohs approach through the skin.  The specimen was taken to the lab, divided into the necessary number of pieces, chromacoded and processed according to the Mohs protocol.  This was repeated in successive stages until a tumor free defect was achieved. Complex Repair Preamble Text (Leave Blank If You Do Not Want): Extensive wide undermining was performed. Intermediate Repair Preamble Text (Leave Blank If You Do Not Want): Undermining was performed with blunt dissection. Non-Graft Cartilage Fenestration Text: The cartilage was fenestrated with a 2mm punch biopsy to help facilitate healing. Graft Cartilage Fenestration Text: The cartilage was fenestrated with a 2mm punch biopsy to help facilitate graft survival and healing. Secondary Intention Text (Leave Blank If You Do Not Want): The defect will heal with secondary intention. No Repair - Repaired With Adjacent Surgical Defect Text (Leave Blank If You Do Not Want): After obtaining clear surgical margins the defect was repaired concurrently with another surgical defect which was in close approximation. Adjacent Tissue Transfer Text: The defect edges were debeveled with a #15 scalpel blade.  Given the location of the defect and the proximity to free margins an adjacent tissue transfer was deemed most appropriate.  Using a sterile surgical marker, an appropriate flap was drawn incorporating the defect and placing the expected incisions within the relaxed skin tension lines where possible.    The area thus outlined was incised deep to adipose tissue with a #15 scalpel blade.  The skin margins were undermined to an appropriate distance in all directions utilizing iris scissors. Advancement Flap (Single) Text: The defect edges were debeveled with a #15 scalpel blade.  Given the location of the defect and the proximity to free margins a single advancement flap was deemed most appropriate.  Using a sterile surgical marker, an appropriate advancement flap was drawn incorporating the defect and placing the expected incisions within the relaxed skin tension lines where possible.    The area thus outlined was incised deep to adipose tissue with a #15 scalpel blade.  The skin margins were undermined to an appropriate distance in all directions utilizing iris scissors. Advancement Flap (Double) Text: The defect edges were debeveled with a #15 scalpel blade.  Given the location of the defect and the proximity to free margins a double advancement flap was deemed most appropriate.  Using a sterile surgical marker, the appropriate advancement flaps were drawn incorporating the defect and placing the expected incisions within the relaxed skin tension lines where possible.    The area thus outlined was incised deep to adipose tissue with a #15 scalpel blade.  The skin margins were undermined to an appropriate distance in all directions utilizing iris scissors. Burow's Advancement Flap Text: The defect edges were debeveled with a #15 scalpel blade.  Given the location of the defect and the proximity to free margins a Burow's advancement flap was deemed most appropriate.  Using a sterile surgical marker, the appropriate advancement flap was drawn incorporating the defect and placing the expected incisions within the relaxed skin tension lines where possible.    The area thus outlined was incised deep to adipose tissue with a #15 scalpel blade.  The skin margins were undermined to an appropriate distance in all directions utilizing iris scissors. Chonodrocutaneous Helical Advancement Flap Text: The defect edges were debeveled with a #15 scalpel blade.  Given the location of the defect and the proximity to free margins a chondrocutaneous helical advancement flap was deemed most appropriate.  Using a sterile surgical marker, the appropriate advancement flap was drawn incorporating the defect and placing the expected incisions within the relaxed skin tension lines where possible.    The area thus outlined was incised deep to adipose tissue with a #15 scalpel blade.  The skin margins were undermined to an appropriate distance in all directions utilizing iris scissors. Crescentic Advancement Flap Text: The defect edges were debeveled with a #15 scalpel blade.  Given the location of the defect and the proximity to free margins a crescentic advancement flap was deemed most appropriate.  Using a sterile surgical marker, the appropriate advancement flap was drawn incorporating the defect and placing the expected incisions within the relaxed skin tension lines where possible.    The area thus outlined was incised deep to adipose tissue with a #15 scalpel blade.  The skin margins were undermined to an appropriate distance in all directions utilizing iris scissors. A-T Advancement Flap Text: The defect edges were debeveled with a #15 scalpel blade.  Given the location of the defect, shape of the defect and the proximity to free margins an A-T advancement flap was deemed most appropriate.  Using a sterile surgical marker, an appropriate advancement flap was drawn incorporating the defect and placing the expected incisions within the relaxed skin tension lines where possible.    The area thus outlined was incised deep to adipose tissue with a #15 scalpel blade.  The skin margins were undermined to an appropriate distance in all directions utilizing iris scissors. O-T Advancement Flap Text: The defect edges were debeveled with a #15 scalpel blade.  Given the location of the defect, shape of the defect and the proximity to free margins an O-T advancement flap was deemed most appropriate.  Using a sterile surgical marker, an appropriate advancement flap was drawn incorporating the defect and placing the expected incisions within the relaxed skin tension lines where possible.    The area thus outlined was incised deep to adipose tissue with a #15 scalpel blade.  The skin margins were undermined to an appropriate distance in all directions utilizing iris scissors. O-L Flap Text: The defect edges were debeveled with a #15 scalpel blade.  Given the location of the defect, shape of the defect and the proximity to free margins an O-L flap was deemed most appropriate.  Using a sterile surgical marker, an appropriate advancement flap was drawn incorporating the defect and placing the expected incisions within the relaxed skin tension lines where possible.    The area thus outlined was incised deep to adipose tissue with a #15 scalpel blade.  The skin margins were undermined to an appropriate distance in all directions utilizing iris scissors. O-Z Flap Text: The defect edges were debeveled with a #15 scalpel blade.  Given the location of the defect, shape of the defect and the proximity to free margins an O-Z flap was deemed most appropriate.  Using a sterile surgical marker, an appropriate transposition flap was drawn incorporating the defect and placing the expected incisions within the relaxed skin tension lines where possible. The area thus outlined was incised deep to adipose tissue with a #15 scalpel blade.  The skin margins were undermined to an appropriate distance in all directions utilizing iris scissors. Double O-Z Flap Text: The defect edges were debeveled with a #15 scalpel blade.  Given the location of the defect, shape of the defect and the proximity to free margins a Double O-Z flap was deemed most appropriate.  Using a sterile surgical marker, an appropriate transposition flap was drawn incorporating the defect and placing the expected incisions within the relaxed skin tension lines where possible. The area thus outlined was incised deep to adipose tissue with a #15 scalpel blade.  The skin margins were undermined to an appropriate distance in all directions utilizing iris scissors. V-Y Flap Text: The defect edges were debeveled with a #15 scalpel blade.  Given the location of the defect, shape of the defect and the proximity to free margins a V-Y flap was deemed most appropriate.  Using a sterile surgical marker, an appropriate advancement flap was drawn incorporating the defect and placing the expected incisions within the relaxed skin tension lines where possible.    The area thus outlined was incised deep to adipose tissue with a #15 scalpel blade.  The skin margins were undermined to an appropriate distance in all directions utilizing iris scissors. Advancement-Rotation Flap Text: The defect edges were debeveled with a #15 scalpel blade.  Given the location of the defect, shape of the defect and the proximity to free margins an advancement-rotation flap was deemed most appropriate.  Using a sterile surgical marker, an appropriate flap was drawn incorporating the defect and placing the expected incisions within the relaxed skin tension lines where possible. The area thus outlined was incised deep to adipose tissue with a #15 scalpel blade.  The skin margins were undermined to an appropriate distance in all directions utilizing iris scissors. Mercedes Flap Text: The defect edges were debeveled with a #15 scalpel blade.  Given the location of the defect, shape of the defect and the proximity to free margins a Mercedes flap was deemed most appropriate.  Using a sterile surgical marker, an appropriate advancement flap was drawn incorporating the defect and placing the expected incisions within the relaxed skin tension lines where possible. The area thus outlined was incised deep to adipose tissue with a #15 scalpel blade.  The skin margins were undermined to an appropriate distance in all directions utilizing iris scissors. Modified Advancement Flap Text: The defect edges were debeveled with a #15 scalpel blade.  Given the location of the defect, shape of the defect and the proximity to free margins a modified advancement flap was deemed most appropriate.  Using a sterile surgical marker, an appropriate advancement flap was drawn incorporating the defect and placing the expected incisions within the relaxed skin tension lines where possible.    The area thus outlined was incised deep to adipose tissue with a #15 scalpel blade.  The skin margins were undermined to an appropriate distance in all directions utilizing iris scissors. Mucosal Advancement Flap Text: Given the location of the defect, shape of the defect and the proximity to free margins a mucosal advancement flap was deemed most appropriate. Incisions were made with a 15 blade scalpel in the appropriate fashion along the cutaneous vermilion border and the mucosal lip. The remaining actinically damaged mucosal tissue was excised.  The mucosal advancement flap was then elevated to the gingival sulcus with care taken to preserve the neurovascular structures and advanced into the primary defect. Care was taken to ensure that precise realignment of the vermilion border was achieved. Peng Advancement Flap Text: The defect edges were debeveled with a #15 scalpel blade.  Given the location of the defect, shape of the defect and the proximity to free margins a Peng advancement flap was deemed most appropriate.  Using a sterile surgical marker, an appropriate advancement flap was drawn incorporating the defect and placing the expected incisions within the relaxed skin tension lines where possible. The area thus outlined was incised deep to adipose tissue with a #15 scalpel blade.  The skin margins were undermined to an appropriate distance in all directions utilizing iris scissors. Hatchet Flap Text: The defect edges were debeveled with a #15 scalpel blade.  Given the location of the defect, shape of the defect and the proximity to free margins a hatchet flap was deemed most appropriate.  Using a sterile surgical marker, an appropriate hatchet flap was drawn incorporating the defect and placing the expected incisions within the relaxed skin tension lines where possible.    The area thus outlined was incised deep to adipose tissue with a #15 scalpel blade.  The skin margins were undermined to an appropriate distance in all directions utilizing iris scissors. Rotation Flap Text: The defect edges were debeveled with a #15 scalpel blade.  Given the location of the defect, shape of the defect and the proximity to free margins a rotation flap was deemed most appropriate.  Using a sterile surgical marker, an appropriate rotation flap was drawn incorporating the defect and placing the expected incisions within the relaxed skin tension lines where possible.    The area thus outlined was incised deep to adipose tissue with a #15 scalpel blade.  The skin margins were undermined to an appropriate distance in all directions utilizing iris scissors. Spiral Flap Text: The defect edges were debeveled with a #15 scalpel blade.  Given the location of the defect, shape of the defect and the proximity to free margins a spiral flap was deemed most appropriate.  Using a sterile surgical marker, an appropriate rotation flap was drawn incorporating the defect and placing the expected incisions within the relaxed skin tension lines where possible. The area thus outlined was incised deep to adipose tissue with a #15 scalpel blade.  The skin margins were undermined to an appropriate distance in all directions utilizing iris scissors. Staged Advancement Flap Text: The defect edges were debeveled with a #15 scalpel blade.  Given the location of the defect, shape of the defect and the proximity to free margins a staged advancement flap was deemed most appropriate.  Using a sterile surgical marker, an appropriate advancement flap was drawn incorporating the defect and placing the expected incisions within the relaxed skin tension lines where possible. The area thus outlined was incised deep to adipose tissue with a #15 scalpel blade.  The skin margins were undermined to an appropriate distance in all directions utilizing iris scissors. Star Wedge Flap Text: The defect edges were debeveled with a #15 scalpel blade.  Given the location of the defect, shape of the defect and the proximity to free margins a star wedge flap was deemed most appropriate.  Using a sterile surgical marker, an appropriate rotation flap was drawn incorporating the defect and placing the expected incisions within the relaxed skin tension lines where possible. The area thus outlined was incised deep to adipose tissue with a #15 scalpel blade.  The skin margins were undermined to an appropriate distance in all directions utilizing iris scissors. Transposition Flap Text: The defect edges were debeveled with a #15 scalpel blade.  Given the location of the defect and the proximity to free margins a transposition flap was deemed most appropriate.  Using a sterile surgical marker, an appropriate transposition flap was drawn incorporating the defect.    The area thus outlined was incised deep to adipose tissue with a #15 scalpel blade.  The skin margins were undermined to an appropriate distance in all directions utilizing iris scissors. Muscle Hinge Flap Text: The defect edges were debeveled with a #15 scalpel blade.  Given the size, depth and location of the defect and the proximity to free margins a muscle hinge flap was deemed most appropriate.  Using a sterile surgical marker, an appropriate hinge flap was drawn incorporating the defect. The area thus outlined was incised with a #15 scalpel blade.  The skin margins were undermined to an appropriate distance in all directions utilizing iris scissors. Mustarde Flap Text: The defect edges were debeveled with a #15 scalpel blade.  Given the size, depth and location of the defect and the proximity to free margins a Mustarde flap was deemed most appropriate.  Using a sterile surgical marker, an appropriate flap was drawn incorporating the defect. The area thus outlined was incised with a #15 scalpel blade.  The skin margins were undermined to an appropriate distance in all directions utilizing iris scissors. Nasal Turnover Hinge Flap Text: The defect edges were debeveled with a #15 scalpel blade.  Given the size, depth, location of the defect and the defect being full thickness a nasal turnover hinge flap was deemed most appropriate.  Using a sterile surgical marker, an appropriate hinge flap was drawn incorporating the defect. The area thus outlined was incised with a #15 scalpel blade. The flap was designed to recreate the nasal mucosal lining and the alar rim. The skin margins were undermined to an appropriate distance in all directions utilizing iris scissors. Nasalis-Muscle-Based Myocutaneous Island Pedicle Flap Text: Using a #15 blade, an incision was made around the donor flap to the level of the nasalis muscle. Wide lateral undermining was then performed in both the subcutaneous plane above the nasalis muscle, and in a submuscular plane just above periosteum. This allowed the formation of a free nasalis muscle axial pedicle (based on the angular artery) which was still attached to the actual cutaneous flap, increasing its mobility and vascular viability. Hemostasis was obtained with pinpoint electrocoagulation. The flap was mobilized into position and the pivotal anchor points positioned and stabilized with buried interrupted sutures. Subcutaneous and dermal tissues were closed in a multilayered fashion with sutures. Tissue redundancies were excised, and the epidermal edges were apposed without significant tension and sutured with sutures. Orbicularis Oris Muscle Flap Text: The defect edges were debeveled with a #15 scalpel blade.  Given that the defect affected the competency of the oral sphincter an orbicularis oris muscle flap was deemed most appropriate to restore this competency and normal muscle function.  Using a sterile surgical marker, an appropriate flap was drawn incorporating the defect. The area thus outlined was incised with a #15 scalpel blade. Melolabial Transposition Flap Text: The defect edges were debeveled with a #15 scalpel blade.  Given the location of the defect and the proximity to free margins a melolabial flap was deemed most appropriate.  Using a sterile surgical marker, an appropriate melolabial transposition flap was drawn incorporating the defect.    The area thus outlined was incised deep to adipose tissue with a #15 scalpel blade.  The skin margins were undermined to an appropriate distance in all directions utilizing iris scissors. Rhombic Flap Text: The defect edges were debeveled with a #15 scalpel blade.  Given the location of the defect and the proximity to free margins a rhombic flap was deemed most appropriate.  Using a sterile surgical marker, an appropriate rhombic flap was drawn incorporating the defect.    The area thus outlined was incised deep to adipose tissue with a #15 scalpel blade.  The skin margins were undermined to an appropriate distance in all directions utilizing iris scissors. Rhomboid Transposition Flap Text: The defect edges were debeveled with a #15 scalpel blade.  Given the location of the defect and the proximity to free margins a rhomboid transposition flap was deemed most appropriate.  Using a sterile surgical marker, an appropriate rhomboid flap was drawn incorporating the defect.    The area thus outlined was incised deep to adipose tissue with a #15 scalpel blade.  The skin margins were undermined to an appropriate distance in all directions utilizing iris scissors. Bi-Rhombic Flap Text: The defect edges were debeveled with a #15 scalpel blade.  Given the location of the defect and the proximity to free margins a bi-rhombic flap was deemed most appropriate.  Using a sterile surgical marker, an appropriate rhombic flap was drawn incorporating the defect. The area thus outlined was incised deep to adipose tissue with a #15 scalpel blade.  The skin margins were undermined to an appropriate distance in all directions utilizing iris scissors. Helical Rim Advancement Flap Text: The defect edges were debeveled with a #15 blade scalpel.  Given the location of the defect and the proximity to free margins (helical rim) a double helical rim advancement flap was deemed most appropriate.  Using a sterile surgical marker, the appropriate advancement flaps were drawn incorporating the defect and placing the expected incisions between the helical rim and antihelix where possible.  The area thus outlined was incised through and through with a #15 scalpel blade.  With a skin hook and iris scissors, the flaps were gently and sharply undermined and freed up. Bilateral Helical Rim Advancement Flap Text: The defect edges were debeveled with a #15 blade scalpel.  Given the location of the defect and the proximity to free margins (helical rim) a bilateral helical rim advancement flap was deemed most appropriate.  Using a sterile surgical marker, the appropriate advancement flaps were drawn incorporating the defect and placing the expected incisions between the helical rim and antihelix where possible.  The area thus outlined was incised through and through with a #15 scalpel blade.  With a skin hook and iris scissors, the flaps were gently and sharply undermined and freed up. Ear Star Wedge Flap Text: The defect edges were debeveled with a #15 blade scalpel.  Given the location of the defect and the proximity to free margins (helical rim) an ear star wedge flap was deemed most appropriate.  Using a sterile surgical marker, the appropriate flap was drawn incorporating the defect and placing the expected incisions between the helical rim and antihelix where possible.  The area thus outlined was incised through and through with a #15 scalpel blade. Banner Transposition Flap Text: The defect edges were debeveled with a #15 scalpel blade.  Given the location of the defect and the proximity to free margins a Banner transposition flap was deemed most appropriate.  Using a sterile surgical marker, an appropriate flap drawn around the defect. The area thus outlined was incised deep to adipose tissue with a #15 scalpel blade.  The skin margins were undermined to an appropriate distance in all directions utilizing iris scissors. Bilobed Flap Text: The defect edges were debeveled with a #15 scalpel blade.  Given the location of the defect and the proximity to free margins a bilobe flap was deemed most appropriate.  Using a sterile surgical marker, an appropriate bilobe flap drawn around the defect.    The area thus outlined was incised deep to adipose tissue with a #15 scalpel blade.  The skin margins were undermined to an appropriate distance in all directions utilizing iris scissors. Bilobed Transposition Flap Text: The defect edges were debeveled with a #15 scalpel blade.  Given the location of the defect and the proximity to free margins a bilobed transposition flap was deemed most appropriate.  Using a sterile surgical marker, an appropriate bilobe flap drawn around the defect.    The area thus outlined was incised deep to adipose tissue with a #15 scalpel blade.  The skin margins were undermined to an appropriate distance in all directions utilizing iris scissors. Trilobed Flap Text: The defect edges were debeveled with a #15 scalpel blade.  Given the location of the defect and the proximity to free margins a trilobed flap was deemed most appropriate.  Using a sterile surgical marker, an appropriate trilobed flap drawn around the defect.    The area thus outlined was incised deep to adipose tissue with a #15 scalpel blade.  The skin margins were undermined to an appropriate distance in all directions utilizing iris scissors. Dorsal Nasal Flap Text: The defect edges were debeveled with a #15 scalpel blade.  Given the location of the defect and the proximity to free margins a dorsal nasal flap was deemed most appropriate.  Using a sterile surgical marker, an appropriate dorsal nasal flap was drawn around the defect.    The area thus outlined was incised deep to adipose tissue with a #15 scalpel blade.  The skin margins were undermined to an appropriate distance in all directions utilizing iris scissors. Island Pedicle Flap Text: The defect edges were debeveled with a #15 scalpel blade.  Given the location of the defect, shape of the defect and the proximity to free margins an island pedicle advancement flap was deemed most appropriate.  Using a sterile surgical marker, an appropriate advancement flap was drawn incorporating the defect, outlining the appropriate donor tissue and placing the expected incisions within the relaxed skin tension lines where possible.    The area thus outlined was incised deep to adipose tissue with a #15 scalpel blade.  The skin margins were undermined to an appropriate distance in all directions around the primary defect and laterally outward around the island pedicle utilizing iris scissors.  There was minimal undermining beneath the pedicle flap. Island Pedicle Flap With Canthal Suspension Text: The defect edges were debeveled with a #15 scalpel blade.  Given the location of the defect, shape of the defect and the proximity to free margins an island pedicle advancement flap was deemed most appropriate.  Using a sterile surgical marker, an appropriate advancement flap was drawn incorporating the defect, outlining the appropriate donor tissue and placing the expected incisions within the relaxed skin tension lines where possible. The area thus outlined was incised deep to adipose tissue with a #15 scalpel blade.  The skin margins were undermined to an appropriate distance in all directions around the primary defect and laterally outward around the island pedicle utilizing iris scissors.  There was minimal undermining beneath the pedicle flap. A suspension suture was placed in the canthal tendon to prevent tension and prevent ectropion. Alar Island Pedicle Flap Text: The defect edges were debeveled with a #15 scalpel blade.  Given the location of the defect, shape of the defect and the proximity to the alar rim an island pedicle advancement flap was deemed most appropriate.  Using a sterile surgical marker, an appropriate advancement flap was drawn incorporating the defect, outlining the appropriate donor tissue and placing the expected incisions within the nasal ala running parallel to the alar rim. The area thus outlined was incised with a #15 scalpel blade.  The skin margins were undermined minimally to an appropriate distance in all directions around the primary defect and laterally outward around the island pedicle utilizing iris scissors.  There was minimal undermining beneath the pedicle flap. Double Island Pedicle Flap Text: The defect edges were debeveled with a #15 scalpel blade.  Given the location of the defect, shape of the defect and the proximity to free margins a double island pedicle advancement flap was deemed most appropriate.  Using a sterile surgical marker, an appropriate advancement flap was drawn incorporating the defect, outlining the appropriate donor tissue and placing the expected incisions within the relaxed skin tension lines where possible.    The area thus outlined was incised deep to adipose tissue with a #15 scalpel blade.  The skin margins were undermined to an appropriate distance in all directions around the primary defect and laterally outward around the island pedicle utilizing iris scissors.  There was minimal undermining beneath the pedicle flap. Island Pedicle Flap-Requiring Vessel Identification Text: The defect edges were debeveled with a #15 scalpel blade.  Given the location of the defect, shape of the defect and the proximity to free margins an island pedicle advancement flap was deemed most appropriate.  Using a sterile surgical marker, an appropriate advancement flap was drawn, based on the axial vessel mentioned above, incorporating the defect, outlining the appropriate donor tissue and placing the expected incisions within the relaxed skin tension lines where possible.    The area thus outlined was incised deep to adipose tissue with a #15 scalpel blade.  The skin margins were undermined to an appropriate distance in all directions around the primary defect and laterally outward around the island pedicle utilizing iris scissors.  There was minimal undermining beneath the pedicle flap. Keystone Flap Text: The defect edges were debeveled with a #15 scalpel blade.  Given the location of the defect, shape of the defect a keystone flap was deemed most appropriate.  Using a sterile surgical marker, an appropriate keystone flap was drawn incorporating the defect, outlining the appropriate donor tissue and placing the expected incisions within the relaxed skin tension lines where possible. The area thus outlined was incised deep to adipose tissue with a #15 scalpel blade.  The skin margins were undermined to an appropriate distance in all directions around the primary defect and laterally outward around the flap utilizing iris scissors. O-T Plasty Text: The defect edges were debeveled with a #15 scalpel blade.  Given the location of the defect, shape of the defect and the proximity to free margins an O-T plasty was deemed most appropriate.  Using a sterile surgical marker, an appropriate O-T plasty was drawn incorporating the defect and placing the expected incisions within the relaxed skin tension lines where possible.    The area thus outlined was incised deep to adipose tissue with a #15 scalpel blade.  The skin margins were undermined to an appropriate distance in all directions utilizing iris scissors. O-Z Plasty Text: The defect edges were debeveled with a #15 scalpel blade.  Given the location of the defect, shape of the defect and the proximity to free margins an O-Z plasty (double transposition flap) was deemed most appropriate.  Using a sterile surgical marker, the appropriate transposition flaps were drawn incorporating the defect and placing the expected incisions within the relaxed skin tension lines where possible.    The area thus outlined was incised deep to adipose tissue with a #15 scalpel blade.  The skin margins were undermined to an appropriate distance in all directions utilizing iris scissors.  Hemostasis was achieved with electrocautery.  The flaps were then transposed into place, one clockwise and the other counterclockwise, and anchored with interrupted buried subcutaneous sutures. Double O-Z Plasty Text: The defect edges were debeveled with a #15 scalpel blade.  Given the location of the defect, shape of the defect and the proximity to free margins a Double O-Z plasty (double transposition flap) was deemed most appropriate.  Using a sterile surgical marker, the appropriate transposition flaps were drawn incorporating the defect and placing the expected incisions within the relaxed skin tension lines where possible. The area thus outlined was incised deep to adipose tissue with a #15 scalpel blade.  The skin margins were undermined to an appropriate distance in all directions utilizing iris scissors.  Hemostasis was achieved with electrocautery.  The flaps were then transposed into place, one clockwise and the other counterclockwise, and anchored with interrupted buried subcutaneous sutures. V-Y Plasty Text: The defect edges were debeveled with a #15 scalpel blade.  Given the location of the defect, shape of the defect and the proximity to free margins an V-Y advancement flap was deemed most appropriate.  Using a sterile surgical marker, an appropriate advancement flap was drawn incorporating the defect and placing the expected incisions within the relaxed skin tension lines where possible.    The area thus outlined was incised deep to adipose tissue with a #15 scalpel blade.  The skin margins were undermined to an appropriate distance in all directions utilizing iris scissors. H Plasty Text: Given the location of the defect, shape of the defect and the proximity to free margins a H-plasty was deemed most appropriate for repair.  Using a sterile surgical marker, the appropriate advancement arms of the H-plasty were drawn incorporating the defect and placing the expected incisions within the relaxed skin tension lines where possible. The area thus outlined was incised deep to adipose tissue with a #15 scalpel blade. The skin margins were undermined to an appropriate distance in all directions utilizing iris scissors.  The opposing advancement arms were then advanced into place in opposite direction and anchored with interrupted buried subcutaneous sutures. W Plasty Text: The lesion was extirpated to the level of the fat with a #15 scalpel blade.  Given the location of the defect, shape of the defect and the proximity to free margins a W-plasty was deemed most appropriate for repair.  Using a sterile surgical marker, the appropriate transposition arms of the W-plasty were drawn incorporating the defect and placing the expected incisions within the relaxed skin tension lines where possible.    The area thus outlined was incised deep to adipose tissue with a #15 scalpel blade.  The skin margins were undermined to an appropriate distance in all directions utilizing iris scissors.  The opposing transposition arms were then transposed into place in opposite direction and anchored with interrupted buried subcutaneous sutures. Z Plasty Text: The lesion was extirpated to the level of the fat with a #15 scalpel blade.  Given the location of the defect, shape of the defect and the proximity to free margins a Z-plasty was deemed most appropriate for repair.  Using a sterile surgical marker, the appropriate transposition arms of the Z-plasty were drawn incorporating the defect and placing the expected incisions within the relaxed skin tension lines where possible.    The area thus outlined was incised deep to adipose tissue with a #15 scalpel blade.  The skin margins were undermined to an appropriate distance in all directions utilizing iris scissors.  The opposing transposition arms were then transposed into place in opposite direction and anchored with interrupted buried subcutaneous sutures. Zygomaticofacial Flap Text: Given the location of the defect, shape of the defect and the proximity to free margins a zygomaticofacial flap was deemed most appropriate for repair.  Using a sterile surgical marker, the appropriate flap was drawn incorporating the defect and placing the expected incisions within the relaxed skin tension lines where possible. The area thus outlined was incised deep to adipose tissue with a #15 scalpel blade with preservation of a vascular pedicle.  The skin margins were undermined to an appropriate distance in all directions utilizing iris scissors.  The flap was then placed into the defect and anchored with interrupted buried subcutaneous sutures. Cheek Interpolation Flap Text: A decision was made to reconstruct the defect utilizing an interpolation axial flap and a staged reconstruction.  A telfa template was made of the defect.  This telfa template was then used to outline the Cheek Interpolation flap.  The donor area for the pedicle flap was then injected with anesthesia.  The flap was excised through the skin and subcutaneous tissue down to the layer of the underlying musculature.  The interpolation flap was carefully excised within this deep plane to maintain its blood supply.  The edges of the donor site were undermined.   The donor site was closed in a primary fashion.  The pedicle was then rotated into position and sutured.  Once the tube was sutured into place, adequate blood supply was confirmed with blanching and refill.  The pedicle was then wrapped with xeroform gauze and dressed appropriately with a telfa and gauze bandage to ensure continued blood supply and protect the attached pedicle. Cheek-To-Nose Interpolation Flap Text: A decision was made to reconstruct the defect utilizing an interpolation axial flap and a staged reconstruction.  A telfa template was made of the defect.  This telfa template was then used to outline the Cheek-To-Nose Interpolation flap.  The donor area for the pedicle flap was then injected with anesthesia.  The flap was excised through the skin and subcutaneous tissue down to the layer of the underlying musculature.  The interpolation flap was carefully excised within this deep plane to maintain its blood supply.  The edges of the donor site were undermined.   The donor site was closed in a primary fashion.  The pedicle was then rotated into position and sutured.  Once the tube was sutured into place, adequate blood supply was confirmed with blanching and refill.  The pedicle was then wrapped with xeroform gauze and dressed appropriately with a telfa and gauze bandage to ensure continued blood supply and protect the attached pedicle. Interpolation Flap Text: A decision was made to reconstruct the defect utilizing an interpolation axial flap and a staged reconstruction.  A telfa template was made of the defect.  This telfa template was then used to outline the interpolation flap.  The donor area for the pedicle flap was then injected with anesthesia.  The flap was excised through the skin and subcutaneous tissue down to the layer of the underlying musculature.  The interpolation flap was carefully excised within this deep plane to maintain its blood supply.  The edges of the donor site were undermined.   The donor site was closed in a primary fashion.  The pedicle was then rotated into position and sutured.  Once the tube was sutured into place, adequate blood supply was confirmed with blanching and refill.  The pedicle was then wrapped with xeroform gauze and dressed appropriately with a telfa and gauze bandage to ensure continued blood supply and protect the attached pedicle. Melolabial Interpolation Flap Text: A decision was made to reconstruct the defect utilizing an interpolation axial flap and a staged reconstruction.  A telfa template was made of the defect.  This telfa template was then used to outline the melolabial interpolation flap.  The donor area for the pedicle flap was then injected with anesthesia.  The flap was excised through the skin and subcutaneous tissue down to the layer of the underlying musculature.  The pedicle flap was carefully excised within this deep plane to maintain its blood supply.  The edges of the donor site were undermined.   The donor site was closed in a primary fashion.  The pedicle was then rotated into position and sutured.  Once the tube was sutured into place, adequate blood supply was confirmed with blanching and refill.  The pedicle was then wrapped with xeroform gauze and dressed appropriately with a telfa and gauze bandage to ensure continued blood supply and protect the attached pedicle. Mastoid Interpolation Flap Text: A decision was made to reconstruct the defect utilizing an interpolation axial flap and a staged reconstruction.  A telfa template was made of the defect.  This telfa template was then used to outline the mastoid interpolation flap.  The donor area for the pedicle flap was then injected with anesthesia.  The flap was excised through the skin and subcutaneous tissue down to the layer of the underlying musculature.  The pedicle flap was carefully excised within this deep plane to maintain its blood supply.  The edges of the donor site were undermined.   The donor site was closed in a primary fashion.  The pedicle was then rotated into position and sutured.  Once the tube was sutured into place, adequate blood supply was confirmed with blanching and refill.  The pedicle was then wrapped with xeroform gauze and dressed appropriately with a telfa and gauze bandage to ensure continued blood supply and protect the attached pedicle. Posterior Auricular Interpolation Flap Text: A decision was made to reconstruct the defect utilizing an interpolation axial flap and a staged reconstruction.  A telfa template was made of the defect.  This telfa template was then used to outline the posterior auricular interpolation flap.  The donor area for the pedicle flap was then injected with anesthesia.  The flap was excised through the skin and subcutaneous tissue down to the layer of the underlying musculature.  The pedicle flap was carefully excised within this deep plane to maintain its blood supply.  The edges of the donor site were undermined.   The donor site was closed in a primary fashion.  The pedicle was then rotated into position and sutured.  Once the tube was sutured into place, adequate blood supply was confirmed with blanching and refill.  The pedicle was then wrapped with xeroform gauze and dressed appropriately with a telfa and gauze bandage to ensure continued blood supply and protect the attached pedicle. Paramedian Forehead Flap Text: A decision was made to reconstruct the defect utilizing an interpolation axial flap and a staged reconstruction.  A telfa template was made of the defect.  This telfa template was then used to outline the paramedian forehead pedicle flap.  The donor area for the pedicle flap was then injected with anesthesia.  The flap was excised through the skin and subcutaneous tissue down to the layer of the underlying musculature.  The pedicle flap was carefully excised within this deep plane to maintain its blood supply.  The edges of the donor site were undermined.   The donor site was closed in a primary fashion.  The pedicle was then rotated into position and sutured.  Once the tube was sutured into place, adequate blood supply was confirmed with blanching and refill.  The pedicle was then wrapped with xeroform gauze and dressed appropriately with a telfa and gauze bandage to ensure continued blood supply and protect the attached pedicle. Abbe Flap (Upper To Lower Lip) Text: The defect of the lower lip was assessed and measured.  Given the location and size of the defect, an Abbe flap was deemed most appropriate.  Using a sterile surgical marker, an appropriate Abbe flap was measured and drawn on the upper lip. Local anesthesia was then infiltrated.  A scalpel was then used to incise the upper lip through and through the skin, vermilion, muscle and mucosa, leaving the flap pedicled on the opposite side.  The flap was then rotated and transferred to the lower lip defect.  The flap was then sutured into place with a three layer technique, closing the orbicularis oris muscle layer with subcutaneous buried sutures, followed by a mucosal layer and an epidermal layer. Abbe Flap (Lower To Upper Lip) Text: The defect of the upper lip was assessed and measured.  Given the location and size of the defect, an Abbe flap was deemed most appropriate.  Using a sterile surgical marker, an appropriate Abbe flap was measured and drawn on the lower lip. Local anesthesia was then infiltrated. A scalpel was then used to incise the upper lip through and through the skin, vermilion, muscle and mucosa, leaving the flap pedicled on the opposite side.  The flap was then rotated and transferred to the lower lip defect.  The flap was then sutured into place with a three layer technique, closing the orbicularis oris muscle layer with subcutaneous buried sutures, followed by a mucosal layer and an epidermal layer. Estlander Flap (Upper To Lower Lip) Text: The defect of the lower lip was assessed and measured.  Given the location and size of the defect, an Estlander flap was deemed most appropriate.  Using a sterile surgical marker, an appropriate Estlander flap was measured and drawn on the upper lip. Local anesthesia was then infiltrated. A scalpel was then used to incise the lateral aspect of the flap, through skin, muscle and mucosa, leaving the flap pedicled medially.  The flap was then rotated and positioned to fill the lower lip defect.  The flap was then sutured into place with a three layer technique, closing the orbicularis oris muscle layer with subcutaneous buried sutures, followed by a mucosal layer and an epidermal layer. Cheiloplasty (Less Than 50%) Text: A decision was made to reconstruct the defect with a  cheiloplasty.  The defect was undermined extensively.  Additional orbicularis oris muscle was excised with a 15 blade scalpel.  The defect was converted into a full thickness wedge, of less than 50% of the vertical height of the lip, to facilite a better cosmetic result.  Small vessels were then tied off with 5-0 monocyrl. The orbicularis oris, superficial fascia, adipose and dermis were then reapproximated.  After the deeper layers were approximated the epidermis was reapproximated with particular care given to realign the vermilion border. Cheiloplasty (Complex) Text: A decision was made to reconstruct the defect with a  cheiloplasty.  The defect was undermined extensively.  Additional orbicularis oris muscle was excised with a 15 blade scalpel.  The defect was converted into a full thickness wedge to facilite a better cosmetic result.  Small vessels were then tied off with 5-0 monocyrl. The orbicularis oris, superficial fascia, adipose and dermis were then reapproximated.  After the deeper layers were approximated the epidermis was reapproximated with particular care given to realign the vermilion border. Ear Wedge Repair Text: A wedge excision was completed by carrying down an excision through the full thickness of the ear and cartilage with an inward facing Burow's triangle. The wound was then closed in a layered fashion. Full Thickness Lip Wedge Repair (Flap) Text: Given the location of the defect and the proximity to free margins a full thickness wedge repair was deemed most appropriate.  Using a sterile surgical marker, the appropriate repair was drawn incorporating the defect and placing the expected incisions perpendicular to the vermilion border.  The vermilion border was also meticulously outlined to ensure appropriate reapproximation during the repair.  The area thus outlined was incised through and through with a #15 scalpel blade.  The muscularis and dermis were reaproximated with deep sutures following hemostasis. Care was taken to realign the vermilion border before proceeding with the superficial closure.  Once the vermilion was realigned the superfical and mucosal closure was finished. Ftsg Text: The defect edges were debeveled with a #15 scalpel blade.  Given the location of the defect, shape of the defect and the proximity to free margins a full thickness skin graft was deemed most appropriate.  Using a sterile surgical marker, the primary defect shape was transferred to the donor site. The area thus outlined was incised deep to adipose tissue with a #15 scalpel blade.  The harvested graft was then trimmed of adipose tissue until only dermis and epidermis was left.  The skin margins of the secondary defect were undermined to an appropriate distance in all directions utilizing iris scissors.  The secondary defect was closed with interrupted buried subcutaneous sutures.  The skin edges were then re-apposed with running  sutures.  The skin graft was then placed in the primary defect and oriented appropriately. Split-Thickness Skin Graft Text: The defect edges were debeveled with a #15 scalpel blade.  Given the location of the defect, shape of the defect and the proximity to free margins a split thickness skin graft was deemed most appropriate.  Using a sterile surgical marker, the primary defect shape was transferred to the donor site. The split thickness graft was then harvested.  The skin graft was then placed in the primary defect and oriented appropriately. Burow's Graft Text: The defect edges were debeveled with a #15 scalpel blade.  Given the location of the defect, shape of the defect, the proximity to free margins and the presence of a standing cone deformity a Burow's skin graft was deemed most appropriate. The standing cone was removed and this tissue was then trimmed to the shape of the primary defect. The adipose tissue was also removed until only dermis and epidermis were left.  The skin margins of the secondary defect were undermined to an appropriate distance in all directions utilizing iris scissors.  The secondary defect was closed with interrupted buried subcutaneous sutures.  The skin edges were then re-apposed with running  sutures.  The skin graft was then placed in the primary defect and oriented appropriately. Cartilage Graft Text: The defect edges were debeveled with a #15 scalpel blade.  Given the location of the defect, shape of the defect, the fact the defect involved a full thickness cartilage defect a cartilage graft was deemed most appropriate.  An appropriate donor site was identified, cleansed, and anesthetized. The cartilage graft was then harvested and transferred to the recipient site, oriented appropriately and then sutured into place.  The secondary defect was then repaired using a primary closure. Composite Graft Text: The defect edges were debeveled with a #15 scalpel blade.  Given the location of the defect, shape of the defect, the proximity to free margins and the fact the defect was full thickness a composite graft was deemed most appropriate.  The defect was outline and then transferred to the donor site.  A full thickness graft was then excised from the donor site. The graft was then placed in the primary defect, oriented appropriately and then sutured into place.  The secondary defect was then repaired using a primary closure. Epidermal Autograft Text: The defect edges were debeveled with a #15 scalpel blade.  Given the location of the defect, shape of the defect and the proximity to free margins an epidermal autograft was deemed most appropriate.  Using a sterile surgical marker, the primary defect shape was transferred to the donor site. The epidermal graft was then harvested.  The skin graft was then placed in the primary defect and oriented appropriately. Dermal Autograft Text: The defect edges were debeveled with a #15 scalpel blade.  Given the location of the defect, shape of the defect and the proximity to free margins a dermal autograft was deemed most appropriate.  Using a sterile surgical marker, the primary defect shape was transferred to the donor site. The area thus outlined was incised deep to adipose tissue with a #15 scalpel blade.  The harvested graft was then trimmed of adipose and epidermal tissue until only dermis was left.  The skin graft was then placed in the primary defect and oriented appropriately. Skin Substitute Text: The defect edges were debeveled with a #15 scalpel blade.  Given the location of the defect, shape of the defect and the proximity to free margins a skin substitute graft was deemed most appropriate.  The graft material was trimmed to fit the size of the defect. The graft was then placed in the primary defect and oriented appropriately. Tissue Cultured Epidermal Autograft Text: The defect edges were debeveled with a #15 scalpel blade.  Given the location of the defect, shape of the defect and the proximity to free margins a tissue cultured epidermal autograft was deemed most appropriate.  The graft was then trimmed to fit the size of the defect.  The graft was then placed in the primary defect and oriented appropriately. Xenograft Text: The defect edges were debeveled with a #15 scalpel blade.  Given the location of the defect, shape of the defect and the proximity to free margins a xenograft was deemed most appropriate.  The graft was then trimmed to fit the size of the defect.  The graft was then placed in the primary defect and oriented appropriately. Purse String (Simple) Text: Given the location of the defect and the characteristics of the surrounding skin a purse string closure was deemed most appropriate.  Undermining was performed circumfirentially around the surgical defect.  A purse string suture was then placed and tightened. Purse String (Intermediate) Text: Given the location of the defect and the characteristics of the surrounding skin a purse string intermediate closure was deemed most appropriate.  Undermining was performed circumfirentially around the surgical defect.  A purse string suture was then placed and tightened. Partial Purse String (Simple) Text: Given the location of the defect and the characteristics of the surrounding skin a simple purse string closure was deemed most appropriate.  Undermining was performed circumfirentially around the surgical defect.  A purse string suture was then placed and tightened. Wound tension only allowed a partial closure of the circular defect. Partial Purse String (Intermediate) Text: Given the location of the defect and the characteristics of the surrounding skin an intermediate purse string closure was deemed most appropriate.  Undermining was performed circumfirentially around the surgical defect.  A purse string suture was then placed and tightened. Wound tension only allowed a partial closure of the circular defect. Localized Dermabrasion With Wire Brush Text: The patient was draped in routine manner.  Localized dermabrasion using 3 x 17 mm wire brush was performed in routine manner to papillary dermis. This spot dermabrasion is being performed to complete skin cancer reconstruction. It also will eliminate the other sun damaged precancerous cells that are known to be part of the regional effect of a lifetime's worth of sun exposure. This localized dermabrasion is therapeutic and should not be considered cosmetic in any regard. Tarsorrhaphy Text: A tarsorrhaphy was performed using Frost sutures. Complex Repair And Flap Additional Text (Will Appearing After The Standard Complex Repair Text): The complex repair was not sufficient to completely close the primary defect. The remaining additional defect was repaired with the flap mentioned below. Complex Repair And Graft Additional Text (Will Appearing After The Standard Complex Repair Text): The complex repair was not sufficient to completely close the primary defect. The remaining additional defect was repaired with the graft mentioned below. Manual Repair Warning Statement: We plan on removing the manually selected variable below in favor of our much easier automatic structured text blocks found in the previous tab. We decided to do this to help make the flow better and give you the full power of structured data. Manual selection is never going to be ideal in our platform and I would encourage you to avoid using manual selection from this point on, especially since I will be sunsetting this feature. It is important that you do one of two things with the customized text below. First, you can save all of the text in a word file so you can have it for future reference. Second, transfer the text to the appropriate area in the Library tab. Lastly, if there is a flap or graft type which we do not have you need to let us know right away so I can add it in before the variable is hidden. No need to panic, we plan to give you roughly 6 months to make the change. Same Histology In Subsequent Stages Text: The pattern and morphology of the tumor is as described in the first stage. No Residual Tumor Seen Histology Text: There were no malignant cells seen in the sections examined. Inflammation Suggestive Of Cancer Camouflage Histology Text: There was a dense lymphocytic infiltrate which prevented adequate histologic evaluation of adjacent structures. Bcc Histology Text: There were numerous aggregates of basaloid cells. Bcc Infiltrative Histology Text: There were numerous aggregates of basaloid cells demonstrating an infiltrative pattern. Mart-1 - Positive Histology Text: MART-1 staining demonstrates areas of higher density and clustering of melanocytes with Pagetoid spread upwards within the epidermis. The surgical margins are positive for tumor cells. Mart-1 - Negative Histology Text: MART-1 staining demonstrates a normal density and pattern of melanocytes along the dermal-epidermal junction. The surgical margins are negative for tumor cells. ia Id #: [id number] Information: Selecting Yes will display possible errors in your note based on the variables you have selected. This validation is only offered as a suggestion for you. PLEASE NOTE THAT THE VALIDATION TEXT WILL BE REMOVED WHEN YOU FINALIZE YOUR NOTE. IF YOU WANT TO FAX A PRELIMINARY NOTE YOU WILL NEED TO TOGGLE THIS TO 'NO' IF YOU DO NOT WANT IT IN YOUR FAXED NOTE.

## 2022-10-24 NOTE — ED ADULT NURSE NOTE - NS ED PATIENT SAFETY CONCERN
Bcc Infiltrative Histology Text: There were numerous aggregates of basaloid cells demonstrating an infiltrative pattern. No

## 2023-01-05 NOTE — PROGRESS NOTE ADULT - ASSESSMENT
Level of Care: Telemetry [5]   Diagnosis: Sepsis ([facility]) [[id number]]   Admitting Physician: DAYTON FREGOSO [[id number]]   Attending Physician: DAYTON FREGOSO [[id number]]   Bed Request Comments: tele   continue tf. no acute compalints

## 2023-01-23 NOTE — PROGRESS NOTE ADULT - PROBLEM SELECTOR PLAN 1
on broad spectrum abx   pan cultured   ua was negative   cxr showed unchanged left pleural effusion   labs were reviewed  currently in sicu for close monitoring forehead

## 2023-02-20 NOTE — ASSESSMENT
[FreeTextEntry1] : status fully discussed with , Caleb< two daughters , and aide\par given perineal maceration , advised to no longer use diapers , and use purple pads.\par Catherine placed on macerated skin\par RTO 2 wks\par \par \par  What Type Of Note Output Would You Prefer (Optional)?: Standard Output Hpi Title: Evaluation of Skin Lesions How Severe Are Your Spot(S)?: mild Have Your Spot(S) Been Treated In The Past?: has not been treated

## 2023-04-13 NOTE — PHYSICAL THERAPY INITIAL EVALUATION ADULT - ADDITIONAL COMMENTS
Prior to hospitalization pt residing in a private home in [place], [place] with her spouse and 2 daughters. Patient independent with ADLs however family shared that she needs assistance at times with ambulation prior to admission. 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 CT in the right corona radiata region when compared with prior CT 6/10/2017 consistent with white matter ischemia, age indeterminate given the fact that it was not identified on the prior 6/10/2017.  CT C/A/P: A 17.0 cm collection along the right lateral thigh musculature adjacent to the right hip prosthesis. Superimposed infection is not excluded. New compression fracture of the L2 vertebral body as above. Correlate with pending lumbar spine MRI. Mildly depressed acute fracture of the sternal manubrium. Acute comminuted fracture of the left acetabulum.  7/26 XR PELVIS:

## 2023-04-19 NOTE — PROGRESS NOTE ADULT - ASSESSMENT
67 yo female with many medical co-morbidities such as UC,dementia, and acid reflux.  Her rt hip down to knee still with a significant amount of hardware yet no gross evidence of relapsed MRSA infection.  It would appear the MCN is achieving its goal of suppressing infection.  Sacral decubitus does not look infected.  She may have had a recent aspiration.  Her urine is likely always with wbc and organisms, I am not to concerned about this finding.  She does not look toxic or septic, rt leg pain likely multifactorial.  Suggest:  1.Continue  per peg BID  2.Cefepime for possible pneumonia, blood cultures pending  3.ortho f/u, consider wound care input  4.Supportive care, follow fever, ? pulmonary in origin  5.Additional w/u pending course, she actually looks more interactive than last admission  6.If fevers persist would favor imaging of RT hip and thigh looking for occult abscess Calculate Months Of Therapy Based On Documented Dosages (Will Hide Months Of Therapy Question)?: No

## 2023-04-26 NOTE — PATIENT PROFILE ADULT - NSPROGENPREVTRANSF_GEN_A_NUR
[Subsequent Evaluation] : a subsequent evaluation for [Parent] : parent [FreeTextEntry2] :  ID# [id number] unknown

## 2023-05-09 NOTE — DISCHARGE NOTE PROVIDER - NSDCFUADDAPPT_GEN_ALL_CORE_FT
Please advise    1  : Lower scarum Midline  sacral wound   Irrigate w/ NS   Pat dry  CAVILON to periwounds skin  Pack w/ AQUACEL   Cover w/ gauze & tegaderm    2 :   L lateral wound         Cleanse w/ NS  Pat dry  Cavilon to periwound skin  AQUACEL to wound  Cover w/ gauze  Secure w/ CLING ROLL Gauze 1  : Lower scarum Midline  sacral wound   Irrigate w/ NS   Pat dry  CAVILON to periwounds skin  Pack w/ AQUACEL   Cover w/ gauze & tegaderm    2 :   L lateral wound         Cleanse w/ NS  Pat dry  Cavilon to periwound skin  AQUACEL to wound  Cover w/ gauze  Secure w/ CLING ROLL Gauze  2 Liters NC for comfort 1  : Lower scarum Midline  sacral wound   Irrigate w/ NS   Pat dry  CAVILON to periwounds skin  Pack w/ AQUACEL   Cover w/ gauze & tegaderm    2 :   L lateral wound         Cleanse w/ NS  Pat dry  Cavilon to periwound skin  AQUACEL to wound  Cover w/ gauze  Secure w/ CLING ROLL Gauze    2 Liters NC for comfort  Pt will need PT/OT  and speech therapy Follow up and Skilled HHA

## 2023-06-06 NOTE — DISCHARGE NOTE NURSING/CASE MANAGEMENT/SOCIAL WORK - NURSING SECTION COMPLETE
Patient/Caregiver provided printed discharge information. Expected Date Of Service: 06/06/2023 Lab Facility: 0 Billing Type: [employer] Bill For Surgical Tray: no

## 2023-06-14 NOTE — PROGRESS NOTE ADULT - SUBJECTIVE AND OBJECTIVE BOX
---___---___---___---___---___---___ ---___---___---___---___---___---___---___---___---___---                    <<<  M E D I C A L   A T T E N D I N G    F O L L O W    U P   N O T E  >>>    - Patient seen and examined by me approximately thirty minutes ago.  - In summary, patient is a 68y year old Female who origianlly presented with elevated bnp found to have new dvt and now has fevr of unknown origin , rhematology  consult apprecited         ---___---___---___---___---___---      <<<  MEDICATIONS:  >>>    MEDICATIONS  (STANDING):  ALBUTerol/ipratropium for Nebulization 3 milliLiter(s) Nebulizer every 6 hours  apixaban 5 milliGRAM(s) Oral every 12 hours  AQUAPHOR (petrolatum Ointment) 1 Application(s) Topical three times a day  buDESOnide 160 MICROgram(s)/formoterol 4.5 MICROgram(s) Inhaler 2 Puff(s) Inhalation two times a day  clobetasol 0.05% Ointment 1 Application(s) Topical two times a day  clonazePAM Tablet 0.5 milliGRAM(s) Oral at bedtime  dextrose 5%. 1000 milliLiter(s) (50 mL/Hr) IV Continuous <Continuous>  dextrose 50% Injectable 12.5 Gram(s) IV Push once  dextrose 50% Injectable 25 Gram(s) IV Push once  dextrose 50% Injectable 25 Gram(s) IV Push once  fentaNYL   Patch  12 MICROgram(s)/Hr 1 Patch Transdermal every 72 hours  ferrous    sulfate Liquid 300 milliGRAM(s) Enteral Tube daily  gabapentin   Solution 300 milliGRAM(s) Oral two times a day  insulin lispro (HumaLOG) corrective regimen sliding scale   SubCutaneous every 6 hours  lactobacillus acidophilus 1 Tablet(s) Oral daily  medical marijuana oil 1 milliLiter(s),medical marijuana oil 1 milliLiter(s) 1 milliLiter(s) SubLingual <User Schedule>  minocycline 100 milliGRAM(s) Oral two times a day  mirtazapine 7.5 milliGRAM(s) Oral at bedtime  pantoprazole   Suspension 40 milliGRAM(s) Oral before breakfast  QUEtiapine 25 milliGRAM(s) Oral at bedtime  sertraline 50 milliGRAM(s) Oral daily  sodium chloride 0.9%. 250 milliLiter(s) (25 mL/Hr) IV Continuous <Continuous>  tiotropium 18 MICROgram(s) Capsule 1 Capsule(s) Inhalation daily  tiZANidine 4 milliGRAM(s) Oral <User Schedule>      MEDICATIONS  (PRN):  acetaminophen    Suspension .. 680 milliGRAM(s) Oral every 6 hours PRN Temp greater or equal to 38C (100.4F), Mild Pain (1 - 3)  dextrose 40% Gel 15 Gram(s) Oral once PRN Blood Glucose LESS THAN 70 milliGRAM(s)/deciliter  glucagon  Injectable 1 milliGRAM(s) IntraMuscular once PRN Glucose LESS THAN 70 milligrams/deciliter  nystatin Powder 1 Application(s) Topical two times a day PRN rash/itchiness       ---___---___---___---___---___---  ros  unable to obtain      ---___---___---___---___---___---          <<<  VITAL SIGNS: >>>    T(F): 100.5 (18 @ 11:34), Max: 101 (18 @ 15:35)  HR: 86 (18 @ 12:00) (86 - 101)  BP: 126/65 (18 @ 12:00) (101/63 - 126/65)  RR: 18 (18 @ 12:00) (18 - 18)  SpO2: 96% (18 @ 12:00) (96% - 97%)  Wt(kg): --  CAPILLARY BLOOD GLUCOSE      POCT Blood Glucose.: 159 mg/dL (24 Dec 2018 12:22)    I&O's Summary    23 Dec 2018 07:01  -  24 Dec 2018 07:00  --------------------------------------------------------  IN: 0 mL / OUT: 850 mL / NET: -850 mL    24 Dec 2018 07:01  -  24 Dec 2018 13:47  --------------------------------------------------------  IN: 0 mL / OUT: 150 mL / NET: -150 mL         ---___---___---___---___---___---                       PHYSICAL EXAM:    GEN: A&O X 0, NAD , comfortable  HEENT: NCAT, PERRL, MMM, no scleral icterus, hearing intact  NECK: Supple, No JVD  CVS: S1S2 , regular , No M/R/G appreciated  PULM: CTA B/L,  no W/R/R appreciated  ABD.: soft. non tender, non distended,  bowel sounds present peg noted   Extrem: intact pulses , no edema noted  Derm: No rash or ecchymosis noted        ---___---___---___---___---___---     <<<  LAB AND IMAGING: >>>                          10.4   12.34 )-----------( 389      ( 24 Dec 2018 07:51 )             32.7               -    142  |  104  |  57<H>  ----------------------------<  121<H>  4.1   |  23  |  0.55    Ca    9.8      24 Dec 2018 06:17             Urinalysis Basic - ( 23 Dec 2018 17:02 )    Color: Yellow / Appearance: Turbid / S.023 / pH: x  Gluc: x / Ketone: Negative  / Bili: Negative / Urobili: Negative mg/dL   Blood: x / Protein: 100 mg/dL / Nitrite: Negative   Leuk Esterase: Large / RBC: 15 /HPF / WBC >720 /HPF   Sq Epi: x / Non Sq Epi: 9 /HPF / Bacteria: Many                        [All pertinent / recent available Imaging reports and other labs reviewed]     ---___---___---___---___---___---___ ---___---___---___---___---           <<<  A S S E S S M E N T   A N D   P L A N :  >>>    fever of unknown origin   dvt   anxiety   chf   depression         -GI/DVT Prophylaxis.    --------------------------------------------  Case discussed with   Education given on     >>______________________<<      Deniz Bolden .         phone   [phone] ---___---___---___---___---___---___ ---___---___---___---___---___---___---___---___---___---                    <<<  M E D I C A L   A T T E N D I N G    F O L L O W    U P   N O T E  >>>    - Patient seen and examined by me approximately thirty minutes ago.  - In summary, patient is a 68y year old Female who origianlly presented with elevated bnp found to have new dvt and now has fever, rhematology  consult apprecited         ---___---___---___---___---___---      <<<  MEDICATIONS:  >>>    MEDICATIONS  (STANDING):  ALBUTerol/ipratropium for Nebulization 3 milliLiter(s) Nebulizer every 6 hours  apixaban 5 milliGRAM(s) Oral every 12 hours  AQUAPHOR (petrolatum Ointment) 1 Application(s) Topical three times a day  buDESOnide 160 MICROgram(s)/formoterol 4.5 MICROgram(s) Inhaler 2 Puff(s) Inhalation two times a day  clobetasol 0.05% Ointment 1 Application(s) Topical two times a day  clonazePAM Tablet 0.5 milliGRAM(s) Oral at bedtime  dextrose 5%. 1000 milliLiter(s) (50 mL/Hr) IV Continuous <Continuous>  dextrose 50% Injectable 12.5 Gram(s) IV Push once  dextrose 50% Injectable 25 Gram(s) IV Push once  dextrose 50% Injectable 25 Gram(s) IV Push once  fentaNYL   Patch  12 MICROgram(s)/Hr 1 Patch Transdermal every 72 hours  ferrous    sulfate Liquid 300 milliGRAM(s) Enteral Tube daily  gabapentin   Solution 300 milliGRAM(s) Oral two times a day  insulin lispro (HumaLOG) corrective regimen sliding scale   SubCutaneous every 6 hours  lactobacillus acidophilus 1 Tablet(s) Oral daily  medical marijuana oil 1 milliLiter(s),medical marijuana oil 1 milliLiter(s) 1 milliLiter(s) SubLingual <User Schedule>  minocycline 100 milliGRAM(s) Oral two times a day  mirtazapine 7.5 milliGRAM(s) Oral at bedtime  pantoprazole   Suspension 40 milliGRAM(s) Oral before breakfast  QUEtiapine 25 milliGRAM(s) Oral at bedtime  sertraline 50 milliGRAM(s) Oral daily  sodium chloride 0.9%. 250 milliLiter(s) (25 mL/Hr) IV Continuous <Continuous>  tiotropium 18 MICROgram(s) Capsule 1 Capsule(s) Inhalation daily  tiZANidine 4 milliGRAM(s) Oral <User Schedule>      MEDICATIONS  (PRN):  acetaminophen    Suspension .. 680 milliGRAM(s) Oral every 6 hours PRN Temp greater or equal to 38C (100.4F), Mild Pain (1 - 3)  dextrose 40% Gel 15 Gram(s) Oral once PRN Blood Glucose LESS THAN 70 milliGRAM(s)/deciliter  glucagon  Injectable 1 milliGRAM(s) IntraMuscular once PRN Glucose LESS THAN 70 milligrams/deciliter  nystatin Powder 1 Application(s) Topical two times a day PRN rash/itchiness       ---___---___---___---___---___---  ros  unable to obtain      ---___---___---___---___---___---          <<<  VITAL SIGNS: >>>    T(F): 100.5 (18 @ 11:34), Max: 101 (18 @ 15:35)  HR: 86 (18 @ 12:00) (86 - 101)  BP: 126/65 (18 @ 12:00) (101/63 - 126/65)  RR: 18 (18 @ 12:00) (18 - 18)  SpO2: 96% (18 @ 12:00) (96% - 97%)  Wt(kg): --  CAPILLARY BLOOD GLUCOSE      POCT Blood Glucose.: 159 mg/dL (24 Dec 2018 12:22)    I&O's Summary    23 Dec 2018 07:01  -  24 Dec 2018 07:00  --------------------------------------------------------  IN: 0 mL / OUT: 850 mL / NET: -850 mL    24 Dec 2018 07:01  -  24 Dec 2018 13:47  --------------------------------------------------------  IN: 0 mL / OUT: 150 mL / NET: -150 mL         ---___---___---___---___---___---                       PHYSICAL EXAM:    GEN: A&O X 0, NAD , comfortable  HEENT: NCAT, PERRL, MMM, no scleral icterus, hearing intact  NECK: Supple, No JVD  CVS: S1S2 , regular , No M/R/G appreciated  PULM: CTA B/L,  no W/R/R appreciated  ABD.: soft. non tender, non distended,  bowel sounds present peg noted   Extrem: intact pulses , no edema noted  Derm: No rash or ecchymosis noted        ---___---___---___---___---___---     <<<  LAB AND IMAGING: >>>                          10.4   12.34 )-----------( 389      ( 24 Dec 2018 07:51 )             32.7               12-    142  |  104  |  57<H>  ----------------------------<  121<H>  4.1   |  23  |  0.55    Ca    9.8      24 Dec 2018 06:17             Urinalysis Basic - ( 23 Dec 2018 17:02 )    Color: Yellow / Appearance: Turbid / S.023 / pH: x  Gluc: x / Ketone: Negative  / Bili: Negative / Urobili: Negative mg/dL   Blood: x / Protein: 100 mg/dL / Nitrite: Negative   Leuk Esterase: Large / RBC: 15 /HPF / WBC >720 /HPF   Sq Epi: x / Non Sq Epi: 9 /HPF / Bacteria: Many                        [All pertinent / recent available Imaging reports and other labs reviewed]     ---___---___---___---___---___---___ ---___---___---___---___---           <<<  A S S E S S M E N T   A N D   P L A N :  >>>    fever of unknown origin   dvt   anxiety   chf   depression         -GI/DVT Prophylaxis.    --------------------------------------------  Case discussed with   Education given on     >>______________________<<      Deniz Bolden .         phone   [phone] Olumiant Counseling: I discussed with the patient the risks of Olumiant therapy including but not limited to upper respiratory tract infections, shingles, cold sores, and nausea. Live vaccines should be avoided.  This medication has been linked to serious infections; higher rate of mortality; malignancy and lymphoproliferative disorders; major adverse cardiovascular events; thrombosis; gastrointestinal perforations; neutropenia; lymphopenia; anemia; liver enzyme elevations; and lipid elevations.

## 2023-08-23 NOTE — PROVIDER CONTACT NOTE (CRITICAL VALUE NOTIFICATION) - SITUATION
You are to isolate yourself for 5 days from the onset of your symptoms    You can take yourself out of isolation after the 5 days, no fever for 24 hours, and symptoms improved
Admitted for + BC MRSA, + UC
Admitted for UTi, +BC, +UC, Left knee fx, Hyponatremia.
Laboratory called to report critical value for patient. Blood Culture from 6/13/19: growth in anaerobic bottle gram positive cocci in clusters
admitted for +BC, +UC, UTI

## 2023-09-22 NOTE — PROGRESS NOTE ADULT - ASSESSMENT
69 yo female with dementia, history of UC, and s/p R Hip hemiarthroplasty 6/22/18, post op hematoma  Admitted after left hip fx, noted severe sepsis, MRSA bacteremia, infected R hip- s/p GABRIEL and spacer  Stormy post op course with subsequent episodes of suspected sepsis- unclear source, course of Cefepime  S/p PEG    9/10 CT of thigh without collection  GAYATHRI negative  CXR reviewed, no obvious signs of pneumonia, trace effusion only  Latest Bld Cxs 9/19 and 9/21 are negative  No obvious explanation for recent fevers which have moderated  Silent aspiration possible  WBC normal  I suspect recent positive blood cultures from 9/22 will be a contaminant, delay in growth and lack of clinical signs of infection support this.  Corynebacteria isolated, I would consider this a contaminant.  Plan:  Completed 42 days of IV MRSA therapy 9/15, continue MCN via PEG, favor at least 6 months of therapy  Wound care per PRS  Follow temps and CBC/diff   Follow conservatively off additional empiric Tx  No need to treat corynebacteria  Discharge planning per primary service negative

## 2023-09-28 NOTE — DISCHARGE NOTE ADULT - NS AS DC FOLLOWUP STROKE INST
E chart message sent to pt.    Last INR on 9/20/23 was 1.7.  Dose maintained.   Today's INR is 2.2 and is within goal range.    Current warfarin total weekly dose of 20 mg verified.  Informed the INR result is within therapeutic range and instructed to maintain current dose per protocol. Discussed dose and return date of 10/4/23 for next INR. See Anticoagulation flowsheet.        Lab INR on 9/27 was 2.2, within range and up from last INR of 1.7 in one week on dose of 21.25mg/last seven days with one boost dose. TWD on file is 20 mg/wk. Previously low INR likely due to missed dose. Multiple attempts to reach pt, but mailbox full. E-chart message sent to pt to continue same dose and contact [facility] with changes and plans for next INR in 2-3 weeks. Plan to continue TWD of 20mg/wk and recheck INR in 2-3 weeks. Could also coordinate with 10/4 PCP appt if pt desires.       Ingrid Villarreal is in the office today supervising the treatment.and is referring provider.     Instructed to contact the clinic with any unusual bleeding or bruising, any changes in medications, diet, health status, lifestyle, or any other changes, questions or concerns. Verbalized understanding of all discussed.      Influenza vaccination (VIS Pub Date: August 7, 2015)

## 2023-10-17 NOTE — H&P ADULT - PROBLEM SELECTOR PLAN 7
- c/w plavix  - bedbound, c/w gavin catheter and peg feeds  - consult placed to pt Bi-Rhombic Flap Text: The defect edges were debeveled with a #15 scalpel blade.  Given the location of the defect and the proximity to free margins a bi-rhombic flap was deemed most appropriate.  Using a sterile surgical marker, an appropriate rhombic flap was drawn incorporating the defect. The area thus outlined was incised deep to adipose tissue with a #15 scalpel blade.  The skin margins were undermined to an appropriate distance in all directions utilizing iris scissors.

## 2023-10-17 NOTE — PROGRESS NOTE ADULT - SUBJECTIVE AND OBJECTIVE BOX
NEPHROLOGY-[facility] [phone]        Patient seen and examined in bed.  No new changes noted   Urinating well         MEDICATIONS  (STANDING):  apixaban 2.5 milliGRAM(s) Oral two times a day  artificial tears (preservative free) Ophthalmic Solution 1 Drop(s) Both EYES four times a day  ascorbic acid 500 milliGRAM(s) Oral daily  BACItracin   Ointment 1 Application(s) Topical two times a day  chlorhexidine 2% Cloths 1 Application(s) Topical daily  clonazePAM  Tablet 1 milliGRAM(s) Oral at bedtime  Dakins Solution - 1/4 Strength 1 Application(s) Topical two times a day  dextrose 5%. 1000 milliLiter(s) (50 mL/Hr) IV Continuous <Continuous>  famotidine    Tablet 20 milliGRAM(s) Oral daily  ferrous    sulfate Liquid 300 milliGRAM(s) Enteral Tube daily  formoterol for nebulization 20 MICROGram(s) Nebulizer two times a day  gabapentin   Solution 300 milliGRAM(s) Oral every 12 hours  hydrocortisone sodium succinate Injectable 25 milliGRAM(s) IV Push daily  insulin lispro (HumaLOG) corrective regimen sliding scale   SubCutaneous every 6 hours  levothyroxine Injectable 60 MICROGram(s) IV Push at bedtime  Medical Marijuana Awake Oil 2 milliLiter(s),Medical Marijuana Awake Oil 2 milliLiter(s) 2 milliLiter(s) Enteral Tube <User Schedule>  Medical Marijuana Calm Oil 2 milliLiter(s),Medical Marinjuana Calm Oil 2 milliLiter(s) 2 milliLiter(s) Enteral Tube <User Schedule>  Medical Marijuana [place] Oil 2 milliLiter(s),Medical Marijuana [place] Oil 2 milliLiter(s) 2 milliLiter(s) Enteral Tube <User Schedule>  metoprolol tartrate 12.5 milliGRAM(s) Oral two times a day  multivitamin/minerals/iron Oral Solution (CENTRUM) 15 milliLiter(s) Oral daily  QUEtiapine 25 milliGRAM(s) Oral at bedtime  sodium chloride 0.9%. 1000 milliLiter(s) (75 mL/Hr) IV Continuous <Continuous>  tiZANidine 4 milliGRAM(s) Oral <User Schedule>  zinc sulfate 220 milliGRAM(s) Oral daily      VITAL:  T(C): , Max: 36.8 (07-23-19 @ 17:00)  T(F): , Max: 98.2 (07-23-19 @ 17:00)  HR: 97 (07-24-19 @ 09:21)  BP: 144/80 (07-24-19 @ 09:21)  BP(mean): --  RR: 18 (07-24-19 @ 09:21)  SpO2: 97% (07-24-19 @ 09:21)  Wt(kg): --    I and O's:    07-23 @ 07:01  -  07-24 @ 07:00  --------------------------------------------------------  IN: 715 mL / OUT: 1070 mL / NET: -355 mL          PHYSICAL EXAM:    Constitutional: NAD; cachetic   Neck:  No JVD  Respiratory: poor effort   Cardiovascular: S1 and S2  Gastrointestinal: BS+, soft, NT/ND  Extremities: +  peripheral edema  Neurological: unable   : +  Blackman  Skin: No rashes  Access: Not applicable    LABS:                        8.1    15.48 )-----------( 343      ( 24 Jul 2019 09:15 )             25.3     07-24    135  |  92<L>  |  124<H>  ----------------------------<  104<H>  3.3<L>   |  19<L>  |  2.72<H>    Ca    8.2<L>      24 Jul 2019 06:24  Phos  8.5     07-24  Mg     2.2     07-24    TPro  5.2<L>  /  Alb  2.2<L>  /  TBili  0.2  /  DBili  x   /  AST  11  /  ALT  6<L>  /  AlkPhos  77  07-24          Urine Studies:          RADIOLOGY & ADDITIONAL STUDIES: Alert-The patient is alert, awake and responds to voice. The patient is oriented to time, place, and person. The triage nurse is able to obtain subjective information.

## 2023-12-13 NOTE — PATIENT PROFILE ADULT - BRADEN FRICTION AND SHEAR
[facility] pharmacy requested a refill on Janumet  A 90 day supply has been sent to Dr Montes for approval.  Patient is due for fasting labs and an appointment before running out.  Lab orders have been placed in the system.    Thank you     (2) potential problem

## 2024-01-16 NOTE — PROGRESS NOTE ADULT - SUBJECTIVE AND OBJECTIVE BOX
Well Visit, Ages 18 to 65: Care Instructions  Well visits can help you stay healthy. Your doctor has checked your overall health and may have suggested ways to take good care of yourself. Your doctor also may have recommended tests. You can help prevent illness with healthy eating, good sleep, vaccinations, regular exercise, and other steps.    Get the tests that you and your doctor decide on. Depending on your age and risks, examples might include screening for diabetes; hepatitis C; HIV; and cervical, breast, lung, and colon cancer. Screening helps find diseases before any symptoms appear.   Eat healthy foods. Choose fruits, vegetables, whole grains, lean protein, and low-fat dairy foods. Limit saturated fat and reduce salt.     Limit alcohol. Men should have no more than 2 drinks a day. Women should have no more than 1. For some people, no alcohol is the best choice.   Exercise. Get at least 30 minutes of exercise on most days of the week. Walking can be a good choice.     Reach and stay at your healthy weight. This will lower your risk for many health problems.   Take care of your mental health. Try to stay connected with friends, family, and community, and find ways to manage stress.     If you're feeling depressed or hopeless, talk to someone. A counselor can help. If you don't have a counselor, talk to your doctor.   Talk to your doctor if you think you may have a problem with alcohol or drug use. This includes prescription medicines, marijuana, and other drugs.     Avoid tobacco and nicotine: Don't smoke, vape, or chew. If you need help quitting, talk to your doctor.   Practice safer sex. Getting tested, using condoms or dental dams, and limiting sex partners can help prevent STIs.     Use birth control if it's important to you to prevent pregnancy. Talk with your doctor about your choices and what might be best for you.   Prevent problems where you can. Protect your skin from too much sun, wash your  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    Patient reports: no complaints,she is  a little more interactive    REVIEW OF SYSTEMS:  All other review of systems negative (Comprehensive ROS)    Antimicrobials Day #  :long term  minocycline 100 milliGRAM(s) Oral two times a day  nystatin    Suspension 483954 Unit(s) Oral four times a day    Other Medications Reviewed    T(F): 98.5 (10-01-18 @ 16:19), Max: 98.5 (10-01-18 @ 16:19)  HR: 87 (10-01-18 @ 16:19)  BP: 110/74 (10-01-18 @ 16:19)  RR: 18 (10-01-18 @ 16:19)  SpO2: 97% (10-01-18 @ 16:19)  Wt(kg): --    PHYSICAL EXAM:  General: alert, no acute distress  Eyes:  anicteric, no conjunctival injection, no discharge  Oropharynx: no lesions or injection 	  Neck: supple, without adenopathy  Lungs: clear to auscultation  Heart: regular rate and rhythm; no murmur, rubs or gallops  Abdomen: soft, nondistended, nontender, without mass or organomegaly  Skin: no lesions  Extremities: no edema  Neuro: baseline dementia  LAB RESULTS:                        9.6    10.6  )-----------( 382      ( 01 Oct 2018 12:56 )             28.8               MICROBIOLOGY:  RECENT CULTURES:  9/22 blood cultures identified as a corynebacteria species    RADIOLOGY REVIEWED:

## 2024-01-22 NOTE — OCCUPATIONAL THERAPY INITIAL EVALUATION ADULT - PRECAUTIONS/LIMITATIONS, REHAB EVAL
Occupational Therapy    Patient not seen in therapy.     On hold due to patient transferred to higher level of medical care/monitoring, we will need new orders to resume  Visit Type: discharge    OBJECTIVE                       Documented in the chart in the following areas: Assessment/Plan.      Therapy procedure time and total treatment time can be found documented on the Time Entry flowsheet   fall precautions/surgical precautions/right hip precautions

## 2024-02-03 NOTE — OCCUPATIONAL THERAPY INITIAL EVALUATION ADULT - SITTING BALANCE: STATIC
[facility] ([place])  Obstetrics  Discharge Summary      Patient Name: Mone Chandler  MRN: [id number]  Admission Date: 2024  Hospital Length of Stay: 3 days  Discharge Date and Time:  2024 11:41 AM  Attending Physician: Courtney Padilla MD   Discharging Provider: Irish Blanco CNM   Primary Care Provider: Courtney Padilla MD    HPI: Mone Chandler is a 33 y.o.  female with IUP at 37w3d weeks gestation who presents for scheduled induction of labor.     Patient denies contractions, denies vaginal bleeding, denies LOF.   Fetal Movement: normal.     This IUP is complicated by fetal growth restriction, polyhydramnios (resolved), septate uterus, anxiety, and iron deficiency anemia.        * No surgery found *     Hospital Course:   24 PPD#1, doing well, normal involution, breastfeeding  2/3/23 PPD #2: overwhelmed, baby discharge delayed secondary to low blood sugar, nurse to help reduce visitors by putting sign on door. Normal involution, breastfeeding going well, bleeding light, no other complaints. Discussed pp warning s/s, risk for pp depression/ anxiety and related resources.  F/u in 2 weeks for virtual mood check.   Contraception--unsure due to septum (wants removed), will f/u with Dr. Padilla.     CC: Post-partum Discharge note, S/P NSVB    Subjective: Mone Chandler is a 33 y.o. female  s/p an uncomplicated NSVB  Eating, and urinating - WNL.   Reports minimal lochia, no concerns.  Breast Feeding: going well, infant has episodic hypoglycemia (supplemented)   Depression: no, increased ANXIETY-discussed pp risk  : Doing well, discharge delayed and will F/U with pediatrician.  Contraception: uncertain, to discuss at pp visit    Objective: /71   Pulse 96   Temp 97.6 °F (36.4 °C)   Resp 16   LMP 05/15/2023   SpO2 98%   Breastfeeding Unknown   GENERAL: No fever or chills.  BREASTS:soft/NTD, nipples   ABDOMEN: No abdominal pain. Uterus firm at U/1  "          Consults (From admission, onward)          Status Ordering Provider     Inpatient consult to Anesthesiology  Once        Provider:  (Not yet assigned)    Acknowledged BOECKING, KATHERINE C            Final Active Diagnoses:    Diagnosis Date Noted POA    PRINCIPAL PROBLEM:   (spontaneous vaginal delivery) [O80] 2024 Not Applicable    Lactating mother [Z39.1] 2024 Not Applicable    Obstetrical laceration, first degree [O70.0] 2024 No    Encounter for induction of labor [Z34.90] 2024 Not Applicable    Septate uterus affecting pregnancy in second trimester [O34.02, Q51.28] 2023 Not Applicable    Anxiety disorder affecting pregnancy, antepartum [O99.340, F41.9] 2023 Yes    Anemia affecting pregnancy in second trimester [O99.012] 2023 Yes    ADRIANO (generalized anxiety disorder) [F41.1] 2015 Yes     Chronic      Problems Resolved During this Admission:        Significant Diagnostic Studies: Labs:   H/H" 10.6/ 33.0--continue pnv while breastfeeding can d/c iron      Feeding Method: breast    Immunizations       Date Immunization Status Dose Route/Site Given by    24 MMR Incomplete 0.5 mL Subcutaneous/     24 Tdap Incomplete 0.5 mL Intramuscular/             Delivery:    Episiotomy: None   Lacerations: 1st   Repair suture:     Repair # of packets: 2   Blood loss (ml):       Birth information:  YOB: 2024   Time of birth: 8:16 PM   Sex: male   Delivery type: Vaginal, Spontaneous   Gestational Age: 38w3d     Measurements    Weight: 2590 g  Weight (lbs): 5 lb 11.4 oz  Length: 49.5 cm  Length (in): 19.5"  Head circumference: 34.4 cm  Chest circumference: 28.6 cm         Delivery Clinician: Delivery Providers    Delivering clinician: Cecilia Oropeza MD   Provider Role    Renita Miller MD Aultman, Kelli, RN Phiyo, Theresa, RN Bruner, Erika, ST Hilkirk, Jennifer, RN Vickanir, INGRID Montgomery              Additional  " information:  Forceps:    Vacuum:    Breech:    Observed anomalies      Living?:     Apgars    Living status: Living  Apgar Component Scores:  1 min.:  5 min.:  10 min.:  15 min.:  20 min.:    Skin color:  0  1       Heart rate:  2  2       Reflex irritability:  2  2       Muscle tone:  1  2       Respiratory effort:  1  2       Total:  6  9       Apgars assigned by: STEVEN TOMLINSON RN         Placenta: Delivered:       appearance  Pending Diagnostic Studies:       None            Discharged Condition: good d/c to home    Disposition:  D/c to ROOM     Follow Up:   Follow-up Information       Courtney Padilla MD Follow up in 2 week(s).    Specialties: Obstetrics, Obstetrics and Gynecology  Why: Virtual Mood Check--hx of ADRIANO prefers no meds  Contact information:  [address] [id number] [phone]               Courtney Padilla MD Follow up in 6 week(s).    Specialties: Obstetrics, Obstetrics and Gynecology  Why: PP check, contraceptive consult  Contact information:  [address] [id number] [phone]                           Patient Instructions:      Diet Adult Regular     Pelvic Rest     Notify your health care provider if you experience any of the following:  temperature >100.4     Notify your health care provider if you experience any of the following:  persistent nausea and vomiting or diarrhea     Notify your health care provider if you experience any of the following:  severe uncontrolled pain     Notify your health care provider if you experience any of the following:  redness, tenderness, or signs of infection (pain, swelling, redness, odor or green/yellow discharge around incision site)     Notify your health care provider if you experience any of the following:  difficulty breathing or increased cough     Notify your health care provider if you experience any of the following:  severe persistent headache     Notify your health care provider if you experience  any of the following:  worsening rash     Notify your health care provider if you experience any of the following:  persistent dizziness, light-headedness, or visual disturbances     Notify your health care provider if you experience any of the following:  increased confusion or weakness     Activity as tolerated     Medications:  Current Discharge Medication List        START taking these medications    Details   ibuprofen (ADVIL,MOTRIN) 600 MG tablet Take 1 tablet (600 mg total) by mouth every 6 (six) hours. for 30 doses  Qty: 30 tablet, Refills: 0           CONTINUE these medications which have NOT CHANGED    Details   cyclobenzaprine (FLEXERIL) 10 MG tablet Take 10 mg by mouth every evening.      diphenhydrAMINE-zinc acetate 2-0.1% (BENADRYL EXTRA STRENGTH) cream Apply topically 3 (three) times daily as needed for Itching.  Qty: 35 g, Refills: G    Comments: D/c kenalog  Associated Diagnoses: Insect bite of back wall of thorax, unspecified location, initial encounter      hydrocortisone 2.5 % cream Apply topically 2 (two) times daily.  Qty: 3.5 g, Refills: 1    Associated Diagnoses: Hemorrhoids during pregnancy in third trimester      hydrOXYzine HCL (ATARAX) 10 MG Tab Take 1 tablet (10 mg total) by mouth 2 (two) times a day.  Qty: 60 tablet, Refills: 3    Associated Diagnoses: Anxiety disorder affecting pregnancy, antepartum      PNV,calcium 72-iron-folic acid (PRENATAL VITAMIN PLUS LOW IRON) 27 mg iron- 1 mg Tab Take one tablet daily.  Prescribe prenatal covered by insurance  Qty: 90 tablet, Refills: 11      triamcinolone acetonide 0.1% (KENALOG) 0.1 % cream Apply topically 2 (two) times daily.           STOP taking these medications       ferrous sulfate 325 (65 FE) MG EC tablet Comments:   Reason for Stopping:           ssessment:  1. Stable postpartum  2. Postpartum day 2.    Plan:   1. Reviewed PP recommendations and precautions.  2. Rx: ibuprofen  3. Contraception: unsure/ f/u and discuss with dr. Padilla.  Desires removal of uterine septum.  4. RTO 2 weeks - mood check/ then 6 week pp visit.  5. Discharge pt to room (baby discharge delayed possibly tomorrow).  Irish Blanco CNM, MSN  02/03/2024  11:39 AM    Irish Blanco CNM  Obstetrics  [facility]     good minus

## 2024-02-24 NOTE — CHART NOTE - NSCHARTNOTESELECT_GEN_ALL_CORE
Event Note/febrile Normal vision: sees adequately in most situations; can see medication labels, newsprint

## 2024-04-20 NOTE — CONSULT NOTE ADULT - ASSESSMENT
69 yr old well known to me  multiple hospitalizations fro months, readmitted again with fever    chronic mrsa infection of spacer on right that can not  be changed.  fracture of left leg from osteoporosis   sever dementia  unresponsive. recurrent uti and pna  recurrent fever though to be due to ongoing mrsa infection of hip.  not candidate for any surgery  severe decubitus.  current fever is not likely due to pna '  could be uti or ongoing mrsa infection in hip  skin is red on legs and she could have cellulitis  has tolerated vanco and cefepime in the past despite "vanco allergy"  will continue pending cultures  discussed with  in detail;  prognosis dismal Statement Selected

## 2024-05-02 NOTE — PROCEDURE NOTE - NSCOMPLICATION_GEN_A_CORE
History and Physical      Nella Carvajal  YOB: 1994    Date of Service:  5/2/2024    Chief Complaint:   Nella Carvajal is a 29 y.o. female who presents for complete physical examination.    HPI: [facility] care    Has not seen a primary care provider in a long time.    She was diagnosed ADHD at age 6 in [place] and was on medication until high school. She believes it was adderall 15-30 mg.     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 She is having trouble focusing to get her work done.     She follows with a counselor through [facility] for anxiety and depression.     Wt Readings from Last 3 Encounters:   05/02/24 96.2 kg (212 lb)   01/31/16 86.2 kg (190 lb)     BP Readings from Last 3 Encounters:   05/02/24 116/70   01/31/16 135/74       Patient Active Problem List   Diagnosis    ADHD (attention deficit hyperactivity disorder), combined type    Anxiety and depression       Preventive Care:  Health Maintenance   Topic Date Due    Varicella vaccine (1 of 2 - 2-dose childhood series) Never done    Hepatitis B vaccine (3 of 3 - 3-dose series) 12/14/1995    Polio vaccine (4 of 4 - 4-dose series) 09/30/1998    DTaP/Tdap/Td vaccine (5 - Tdap) 09/30/2005    Depression Screen  Never done    HIV screen  Never done    Hepatitis C screen  Never done    Pap smear  Never done    COVID-19 Vaccine (2 - 2023-24 season) 09/01/2023    Flu vaccine (Season Ended) 08/01/2024    Hib vaccine  Completed    Hepatitis A vaccine  Aged Out    HPV vaccine  Aged Out    Meningococcal (ACWY) vaccine  Aged Out    Pneumococcal 0-64 years Vaccine  Aged Out      Hx abnormal PAP: no  Sexual activity: single partner, contraception - IUD   Self-breast exams: yes  Previous DEXA scan: no  Last eye exam: about 1 year ago, normal  Exercise: no regular exercise  Seatbelt use: Yes  Lipid panel: No results found for: \"CHOL\", \"TRIG\", \"HDL\", \"LDLDIRECT\"     Living will:  no,   Advance Care Planning 
no complications

## 2024-05-04 NOTE — PATIENT PROFILE ADULT - EQUIPMENT CURRENTLY USED AT HOME
Writer informed patient per MD,\"----- Message from Claribel Reza MD sent at 9/15/2023  4:00 PM CDT -----  Recommend cystoscopy and CT urogram for eval of blood in the urine. Can be done with myself or at [place] if she plans to follow up in [place]. \" orders placed. Transferred to [facility] to schedule cysto. Patient verbalized understanding and agreeable to plan of care, no further questions or concerns at this time.       Dehydration yes

## 2024-07-10 NOTE — OCCUPATIONAL THERAPY INITIAL EVALUATION ADULT - LEVEL OF INDEPENDENCE: BED TO CHAIR, REHAB EVAL
Procedure  Critical Care    Performed by: Gee Diop MD  Authorized by: Gee Diop MD    Critical care provider statement:     Critical care time (minutes):  45    Critical care time was exclusive of:  Separately billable procedures and treating other patients    Critical care was necessary to treat or prevent imminent or life-threatening deterioration of the following conditions:  Trauma    Critical care was time spent personally by me on the following activities:  Ordering and performing treatments and interventions, ordering and review of laboratory studies, ordering and review of radiographic studies, pulse oximetry, re-evaluation of patient's condition, examination of patient, evaluation of patient's response to treatment and obtaining history from patient or surrogate               Gee Diop MD  07/10/24 8050     recommend nonmechanical lift device

## 2024-07-29 NOTE — DISCHARGE NOTE ADULT - HOSPITAL COURSE
Biopsy Photograph Reviewed: Yes Consent Type: Consent 1 (Standard) Eye Shield Used: No Initial Size Of Lesion: 1.5 X Size Of Lesion In Cm (Optional): 1.3 Number Of Stages: 1 Primary Defect Length In Cm (Final Defect Size - Required For Flaps/Grafts): 1.7 Primary Defect Depth In Cm (Optional But Required For Some Insurers): 0 Repair Type: Complex Repair Which Instrument Did You Use For Dermabrasion?: Wire Brush Which Eyelid Repair Cpt Are You Using?: 32574 Oculoplastic Surgeon Procedure Text (A): After obtaining clear surgical margins the patient was sent to oculoplastics for surgical repair.  The patient understands they will receive post-surgical care and follow-up from the referring physician's office. Otolaryngologist Procedure Text (A): After obtaining clear surgical margins the patient was sent to otolaryngology for surgical repair.  The patient understands they will receive post-surgical care and follow-up from the referring physician's office. Plastic Surgeon Procedure Text (A): After obtaining clear surgical margins the patient was sent to plastics for surgical repair.  The patient understands they will receive post-surgical care and follow-up from the referring physician's office. Mid-Level Procedure Text (A): After obtaining clear surgical margins the patient was sent to a mid-level provider for surgical repair.  The patient understands they will receive post-surgical care and follow-up from the mid-level provider. Provider Procedure Text (A): After obtaining clear surgical margins the defect was repaired by another provider. Asc Procedure Text (A): After obtaining clear surgical margins the patient was sent to an ASC for surgical repair.  The patient understands they will receive post-surgical care and follow-up from the ASC physician. Simple / Intermediate / Complex Repair - Final Wound Length In Cm: 5.3 Suturegard Retention Suture: 2-0 Nylon Retention Suture Bite Size: 3 mm Length To Time In Minutes Device Was In Place: 10 Distance Of Undermining In Cm (Required): 2 Undermining Type: Entire Wound Debridement Text: The wound edges were debrided prior to proceeding with the closure to facilitate wound healing. Helical Rim Text: The closure involved the helical rim. Vermilion Border Text: The closure involved the vermilion border. Nostril Rim Text: The closure involved the nostril rim. Retention Suture Text: Retention sutures were placed to support the closure and prevent dehiscence. Location Indication Override (Is Already Calculated Based On Selected Body Location): Area H Area H Indication Text: Tumors in this location are included in Area H (eyelids, eyebrows, nose, lips, chin, ear, pre-auricular, post-auricular, temple, genitalia, hands, feet, ankles and areola).  Tissue conservation is critical in these anatomic locations. Area M Indication Text: Tumors in this location are included in Area M (cheek, forehead, scalp, neck, jawline and pretibial skin).  Mohs surgery is indicated for tumors in these anatomic locations. Area L Indication Text: Tumors in this location are included in Area L (trunk and extremities).  Mohs surgery is indicated for larger tumors, or tumors with aggressive histologic features, in these anatomic locations. Tumor Debulked?: curette Depth Of Tumor Invasion (For Histology): tumor not visualized (deep and peripheral margins are clear of tumor) Perineural Invasion (For Histology - Be Specific If Possible): absent Special Stains Stage 1 - Results: Base On Clearance Noted Above Stage 2: Additional Anesthesia Type: 1% lidocaine with epinephrine and a 1:10 solution of 8.4% sodium bicarbonate Stage 3: Additional Anesthesia Type: 1% lidocaine with epinephrine Include Tumor Staging In Mohs Note?: Please Select the Appropriate Response Staging Info: By selecting yes to the question above you will include information on AJCC 8 tumor staging in your Mohs note. Information on tumor staging will be automatically added for SCCs on the head and neck. AJCC 8 includes tumor size, tumor depth, perineural involvement and bone invasion. Tumor Depth: Less than 6mm from granular layer and no invasion beyond the subcutaneous fat Medical Necessity Statement: Based on my medical judgement, Mohs surgery is the most appropriate treatment for this cancer compared to other treatments. Alternatives Discussed Intro (Do Not Add Period): I discussed alternative treatments to Mohs surgery and specifically discussed the risks and benefits of Consent 1/Introductory Paragraph: The rationale for Mohs was explained to the patient and consent was obtained. The risks, benefits and alternatives to therapy were discussed in detail. Specifically, the risks of infection, scarring, bleeding, prolonged wound healing, incomplete removal, allergy to anesthesia, nerve injury and recurrence were addressed. Prior to the procedure, the treatment site was clearly identified and confirmed by the patient. All components of Universal Protocol/PAUSE Rule completed. Consent 2/Introductory Paragraph: Mohs surgery was explained to the patient and consent was obtained. The risks, benefits and alternatives to therapy were discussed in detail. Specifically, the risks of infection, scarring, bleeding, prolonged wound healing, incomplete removal, allergy to anesthesia, nerve injury and recurrence were addressed. Prior to the procedure, the treatment site was clearly identified and confirmed by the patient. All components of Universal Protocol/PAUSE Rule completed. Consent 3/Introductory Paragraph: I gave the patient a chance to ask questions they had about the procedure.  Following this I explained the Mohs procedure and consent was obtained. The risks, benefits and alternatives to therapy were discussed in detail. Specifically, the risks of infection, scarring, bleeding, prolonged wound healing, incomplete removal, allergy to anesthesia, nerve injury and recurrence were addressed. Prior to the procedure, the treatment site was clearly identified and confirmed by the patient. All components of Universal Protocol/PAUSE Rule completed. Consent (Temporal Branch)/Introductory Paragraph: The rationale for Mohs was explained to the patient and consent was obtained. The risks, benefits and alternatives to therapy were discussed in detail. Specifically, the risks of damage to the temporal branch of the facial nerve, infection, scarring, bleeding, prolonged wound healing, incomplete removal, allergy to anesthesia, and recurrence were addressed. Prior to the procedure, the treatment site was clearly identified and confirmed by the patient. All components of Universal Protocol/PAUSE Rule completed. Consent (Marginal Mandibular)/Introductory Paragraph: The rationale for Mohs was explained to the patient and consent was obtained. The risks, benefits and alternatives to therapy were discussed in detail. Specifically, the risks of damage to the marginal mandibular branch of the facial nerve, infection, scarring, bleeding, prolonged wound healing, incomplete removal, allergy to anesthesia, and recurrence were addressed. Prior to the procedure, the treatment site was clearly identified and confirmed by the patient. All components of Universal Protocol/PAUSE Rule completed. Consent (Spinal Accessory)/Introductory Paragraph: The rationale for Mohs was explained to the patient and consent was obtained. The risks, benefits and alternatives to therapy were discussed in detail. Specifically, the risks of damage to the spinal accessory nerve, infection, scarring, bleeding, prolonged wound healing, incomplete removal, allergy to anesthesia, and recurrence were addressed. Prior to the procedure, the treatment site was clearly identified and confirmed by the patient. All components of Universal Protocol/PAUSE Rule completed. Consent (Near Eyelid Margin)/Introductory Paragraph: The rationale for Mohs was explained to the patient and consent was obtained. The risks, benefits and alternatives to therapy were discussed in detail. Specifically, the risks of ectropion or eyelid deformity, infection, scarring, bleeding, prolonged wound healing, incomplete removal, allergy to anesthesia, nerve injury and recurrence were addressed. Prior to the procedure, the treatment site was clearly identified and confirmed by the patient. All components of Universal Protocol/PAUSE Rule completed. Consent (Ear)/Introductory Paragraph: The rationale for Mohs was explained to the patient and consent was obtained. The risks, benefits and alternatives to therapy were discussed in detail. Specifically, the risks of ear deformity, infection, scarring, bleeding, prolonged wound healing, incomplete removal, allergy to anesthesia, nerve injury and recurrence were addressed. Prior to the procedure, the treatment site was clearly identified and confirmed by the patient. All components of Universal Protocol/PAUSE Rule completed. Consent (Nose)/Introductory Paragraph: The rationale for Mohs was explained to the patient and consent was obtained. The risks, benefits and alternatives to therapy were discussed in detail. Specifically, the risks of nasal deformity, changes in the flow of air through the nose, infection, scarring, bleeding, prolonged wound healing, incomplete removal, allergy to anesthesia, nerve injury and recurrence were addressed. Prior to the procedure, the treatment site was clearly identified and confirmed by the patient. All components of Universal Protocol/PAUSE Rule completed. Consent (Lip)/Introductory Paragraph: The rationale for Mohs was explained to the patient and consent was obtained. The risks, benefits and alternatives to therapy were discussed in detail. Specifically, the risks of lip deformity, changes in the oral aperture, infection, scarring, bleeding, prolonged wound healing, incomplete removal, allergy to anesthesia, nerve injury and recurrence were addressed. Prior to the procedure, the treatment site was clearly identified and confirmed by the patient. All components of Universal Protocol/PAUSE Rule completed. Consent (Scalp)/Introductory Paragraph: The rationale for Mohs was explained to the patient and consent was obtained. The risks, benefits and alternatives to therapy were discussed in detail. Specifically, the risks of changes in hair growth pattern secondary to repair, infection, scarring, bleeding, prolonged wound healing, incomplete removal, allergy to anesthesia, nerve injury and recurrence were addressed. Prior to the procedure, the treatment site was clearly identified and confirmed by the patient. All components of Universal Protocol/PAUSE Rule completed. Detail Level: Detailed Postop Diagnosis: same Surgeon: Tc Conn MD Anesthesia Volume In Cc: 3 Hemostasis: Electrodesiccation Estimated Blood Loss (Cc): minimal Advanced dementia, right thr mrsa infection s/p leisa and spacer on suppressive minocycline, recurrent aspiration events, chronic gavin   completed  tx for cre pseudomonas  The patient has completed a full course of Zerbaxa   repeat ucx was reported to growing  yeast which is a colonizing vince   wbc is stable   low grade fever is likely secondary DVT inflammatory   her fevers have resolved Repair Anesthesia Method: local infiltration Anesthesia Type: 1% lidocaine with 1:100,000 epinephrine and a 1:10 solution of 8.4% sodium bicarbonate Anesthesia Volume In Cc: 5 Repair Hemostasis (Optional): Electrocoagulation Brow Lift Text: A midfrontal incision was made medially to the defect to allow access to the tissues just superior to the left eyebrow. Following careful dissection inferiorly in a supraperiosteal plane to the level of the left eyebrow, several 3-0 monocryl sutures were used to resuspend the eyebrow orbicularis oculi muscular unit to the superior frontal bone periosteum. This resulted in an appropriate reapproximation of static eyebrow symmetry and correction of the left brow ptosis. Deep Sutures: 4-0 Vicryl Epidermal Sutures: 5-0 Ethilon Epidermal Closure: running Suturegard Intro: Intraoperative tissue expansion was performed, utilizing the SUTUREGARD device, in order to reduce wound tension. Suturegard Body: The suture ends were repeatedly re-tightened and re-clamped to achieve the desired tissue expansion. Hemigard Intro: Due to skin fragility and wound tension, it was decided to use HEMIGARD adhesive retention suture devices to permit a linear closure. The skin was cleaned and dried for a 6cm distance away from the wound. Excessive hair, if present, was removed to allow for adhesion. Hemigard Postcare Instructions: The HEMIGARD strips are to remain completely dry for at least 5-7 days. Donor Site Anesthesia Type: same as repair anesthesia Graft Basting Suture (Optional): 5-0 Plain Gut Graft Donor Site Epidermal Sutures (Optional): 6-0 Ethilon Closure 2 Information: This tab is for additional flaps and grafts, including complex repair and grafts and complex repair and flaps. You can also specify a different location for the additional defect, if the location is the same you do not need to select a new one. We will insert the automated text for the repair you select below just as we do for solitary flaps and grafts. Please note that at this time if you select a location with a different insurance zone you will need to override the ICD10 and CPT if appropriate. Closure 3 Information: This tab is for additional flaps and grafts above and beyond our usual structured repairs.  Please note if you enter information here it will not currently bill and you will need to add the billing information manually. Wound Care: Vaseline Dressing: pressure dressing with telfa Wound Care (No Sutures): Petrolatum Dressing (No Sutures): dry sterile dressing Suture Removal: 14 days Post-Care Instructions: I reviewed with the patient in detail post-care instructions. Patient is not to engage in any heavy lifting, exercise, or swimming for the next 14 days. Should the patient develop any fevers, chills, bleeding, severe pain patient will contact the office immediately. Pain Refusal Text: I offered to prescribe pain medication but the patient refused to take this medication. Mauc Instructions: By selecting yes to the question below the MAUC number will be added into the note.  This will be calculated automatically based on the diagnosis chosen, the size entered, the body zone selected (H,M,L) and the specific indications you chose. You will also have the option to override the Mohs AUC if you disagree with the automatically calculated number and this option is found in the Case Summary tab. Asa Classification: II Time Everyone Was In The Room/Time Out: 4186 Advanced dementia, right thr mrsa infection s/p leisa and spacer on suppressive minocycline, recurrent aspiration events, chronic gavin   completed  tx for cre pseudomonas  The patient has completed a full course of Zerbaxa   repeat ucx was reported to growing  yeast which is a colonizing vince    low grade fever is likely secondary DVT inflammatory   her fevers have resolved Time Procedure Started: 1450 Time Procedure Ended: 0068 68F with advanced dementia (nonverbal, dysphagia with PEG tube, bedbound- functional quadriplegia, chronic gavin catheter in place), sacral pressure ulcer stage 3, heel pressure ulcers, asthma on prednisone, HLD, CVA, UC, 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 Admitted with biventricular congestive heart failure.  TTE on this admission shows reduced LVEF, elevated pulmonary pressures. S/P IV Lasix Lasix 20mg IV and transitioned to Lasix 40 via PEG  Course complicated by fevers. CT of C/A/P  with no defined infection    Hx of right mrsa prosthetic hip infection, s/p leisa and spacer was onsuppressive minocycline (stopped on 1/8/19), chronic gavin completed tx for CRE pseudomonas s/p full course of Zerbaxa . Repeat ucx- yeast- colonizing vince, difficult to implicate as source of fever; even with such, non albicans candida would require course of IV tx.  Sacral decubitus described as not infected- cx with typical colonizing vince  Also found to have extensive DVT R LE- ?link to fever  Patient with intermittent low grade fever and her leukocytosis has waxed and waned and is likely secondary DVT inflammatory Vs microaspiration.------------------- Where Do You Want The Question To Include Opioid Counseling Located?: Case Summary Tab Eye Protection Verbiage: Before proceeding with the stage, a plastic scleral shield was inserted. The globe was anesthetized with a few drops of 1% lidocaine with 1:100,000 epinephrine. Then, an appropriate sized scleral shield was chosen and coated with lacrilube ointment. The shield was gently inserted and left in place for the duration of each stage. After the stage was completed, the shield was gently removed. Mohs Method Verbiage: An incision at a 45 degree angle following the standard Mohs approach was done and the specimen was harvested as a microscopic controlled layer. Surgeon/Pathologist Verbiage (Will Incorporate Name Of Surgeon From Intro If Not Blank): operated in two distinct and integrated capacities as the surgeon and pathologist. Mohs Histo Method Verbiage: Each section was then chromacoded and processed in the Mohs lab using the Mohs protocol and submitted for frozen section. Subsequent Stages Histo Method Verbiage: Using a similar technique to that described above, a thin layer of tissue was removed from all areas where tumor was visible on the previous stage.  The tissue was again oriented, mapped, dyed, and processed as above. Mohs Rapid Report Verbiage: The area of clinically evident tumor was marked with skin marking ink and appropriately hatched.  The initial incision was made following the Mohs approach through the skin.  The specimen was taken to the lab, divided into the necessary number of pieces, chromacoded and processed according to the Mohs protocol.  This was repeated in successive stages until a tumor free defect was achieved. Complex Repair Preamble Text (Leave Blank If You Do Not Want): The defect edges were debeveled with a scalpel blade. Given the location of the defect and movement of the tissue a complex repair was deemed most appropriate.  Using a sterile surgical marker, Burow's triangles were drawn incorporating the defect and placing the expected incisions within the relaxed skin tension lines where possible. The area thus outlined was incised to the appropriate tissue plane with a scalpel blade and excised. Extensive wide undermining was performed. Crescentic Complex Repair Preamble Text (Leave Blank If You Do Not Want): Extensive wide undermining was performed. Intermediate Repair Preamble Text (Leave Blank If You Do Not Want): Undermining was performed with blunt dissection. 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 Admitted with biventricular congestive heart failure.  TTE on this admission shows reduced LVEF, elevated pulmonary pressures. S/P IV Lasix Lasix 20mg IV and transitioned to Lasix 40 via PEG  Course complicated by fevers. CT of C/A/P  with no defined infection    Hx of right mrsa prosthetic hip infection, s/p leisa and spacer was onsuppressive minocycline (stopped on 1/8/19), chronic gavin completed tx for CRE pseudomonas s/p full course of Zerbaxa . Repeat ucx- yeast- colonizing vince, difficult to implicate as source of fever; even with such, non albicans candida would require course of IV tx.  Sacral decubitus described as not infected- cx with typical colonizing vince  Also found to have extensive DVT R LE- ?link to fever  Patient with intermittent low grade fever and her leukocytosis has waxed and waned and is likely secondary DVT inflammatory Vs microaspiration. Graft Cartilage Fenestration Text: The cartilage was fenestrated with a 2mm punch biopsy to help facilitate graft survival and healing. Non-Graft Cartilage Fenestration Text: The cartilage was fenestrated with a 2mm punch biopsy to help facilitate healing. Secondary Intention Text (Leave Blank If You Do Not Want): The defect will heal with secondary intention. No Repair - Repaired With Adjacent Surgical Defect Text (Leave Blank If You Do Not Want): After obtaining clear surgical margins the defect was repaired concurrently with another surgical defect which was in close approximation. Adjacent Tissue Transfer Text: The defect edges were debeveled with a #15 scalpel blade.  Given the location of the defect and the proximity to free margins an adjacent tissue transfer was deemed most appropriate.  Using a sterile surgical marker, an appropriate flap was drawn incorporating the defect and placing the expected incisions within the relaxed skin tension lines where possible.    The area thus outlined was incised deep to adipose tissue with a #15 scalpel blade.  The skin margins were undermined to an appropriate distance in all directions utilizing iris scissors. Following this, the designed flap was advanced and carried over into the primary defect and sutured into place. Advancement Flap (Single) Text: The defect edges were debeveled with a #15 scalpel blade.  Given the location of the defect and the proximity to free margins a single advancement flap was deemed most appropriate.  Using a sterile surgical marker, an appropriate advancement flap was drawn incorporating the defect and placing the expected incisions within the relaxed skin tension lines where possible.    The area thus outlined was incised deep to adipose tissue with a #15 scalpel blade.  The skin margins were undermined to an appropriate distance in all directions utilizing iris scissors. Following this, the designed flap was advanced and carried over into the primary defect and sutured into place. Advancement Flap (Double) Text: The defect edges were debeveled with a #15 scalpel blade.  Given the location of the defect and the proximity to free margins a double advancement flap was deemed most appropriate.  Using a sterile surgical marker, the appropriate advancement flaps were drawn incorporating the defect and placing the expected incisions within the relaxed skin tension lines where possible.    The area thus outlined was incised deep to adipose tissue with a #15 scalpel blade.  The skin margins were undermined to an appropriate distance in all directions utilizing iris scissors. Following this, the designed flap was advanced and carried over into the primary defect and sutured into place. Advancement-Rotation Flap Text: The defect edges were debeveled with a #15 scalpel blade.  Given the location of the defect, shape of the defect and the proximity to free margins an advancement-rotation flap was deemed most appropriate.  Using a sterile surgical marker, an appropriate flap was drawn incorporating the defect and placing the expected incisions within the relaxed skin tension lines where possible. The area thus outlined was incised deep to adipose tissue with a #15 scalpel blade.  The skin margins were undermined to an appropriate distance in all directions utilizing iris scissors. Alar Island Pedicle Flap Text: The defect edges were debeveled with a #15 scalpel blade.  Given the location of the defect, shape of the defect and the proximity to the alar rim an island pedicle advancement flap was deemed most appropriate.  Using a sterile surgical marker, an appropriate advancement flap was drawn incorporating the defect, outlining the appropriate donor tissue and placing the expected incisions within the nasal ala running parallel to the alar rim. The area thus outlined was incised with a #15 scalpel blade.  The skin margins were undermined minimally to an appropriate distance in all directions around the primary defect and laterally outward around the island pedicle utilizing iris scissors.  There was minimal undermining beneath the pedicle flap. A-T Advancement Flap Text: The defect edges were debeveled with a #15 scalpel blade.  Given the location of the defect, shape of the defect and the proximity to free margins an A-T advancement flap was deemed most appropriate.  Using a sterile surgical marker, an appropriate advancement flap was drawn incorporating the defect and placing the expected incisions within the relaxed skin tension lines where possible.    The area thus outlined was incised deep to adipose tissue with a #15 scalpel blade.  The skin margins were undermined to an appropriate distance in all directions utilizing iris scissors. Banner Transposition Flap Text: The defect edges were debeveled with a #15 scalpel blade.  Given the location of the defect and the proximity to free margins a Banner transposition flap was deemed most appropriate.  Using a sterile surgical marker, an appropriate flap drawn around the defect. The area thus outlined was incised deep to adipose tissue with a #15 scalpel blade.  The skin margins were undermined to an appropriate distance in all directions utilizing iris scissors. Following this, the designed flap was advanced and carried over into the primary defect and sutured into place. Bilateral Helical Rim Advancement Flap Text: The defect edges were debeveled with a #15 blade scalpel.  Given the location of the defect and the proximity to free margins (helical rim) a bilateral helical rim advancement flap was deemed most appropriate.  Using a sterile surgical marker, the appropriate advancement flaps were drawn incorporating the defect and placing the expected incisions between the helical rim and antihelix where possible.  The area thus outlined was incised through and through with a #15 scalpel blade.  With a skin hook and iris scissors, the flaps were gently and sharply undermined and freed up. Following this, the designed flap was advanced and carried over into the primary defect and sutured into place. Bilateral Rotation Flap Text: The defect edges were debeveled with a #15 scalpel blade. Given the location of the defect, shape of the defect and the proximity to free margins a bilateral rotation flap was deemed most appropriate. Using a sterile surgical marker, an appropriate rotation flap was drawn incorporating the defect and placing the expected incisions within the relaxed skin tension lines where possible. The area thus outlined was incised deep to adipose tissue with a #15 scalpel blade. The skin margins were undermined to an appropriate distance in all directions utilizing iris scissors. Following this, the designed flap was carried over into the primary defect and sutured into place. Following this, the designed flap was advanced and carried over into the primary defect and sutured into place. Bilobed Flap Text: The defect edges were debeveled with a #15 scalpel blade.  Given the location of the defect and the proximity to free margins a bilobe flap was deemed most appropriate.  Using a sterile surgical marker, an appropriate bilobe flap drawn around the defect.    The area thus outlined was incised deep to adipose tissue with a #15 scalpel blade.  The skin margins were undermined to an appropriate distance in all directions utilizing iris scissors.Following this, the designed flap was advanced and carried over into the primary defect and sutured into place. Bilobed Transposition Flap Text: The defect edges were debeveled with a #15 scalpel blade.  Given the location of the defect and the proximity to free margins a bilobed transposition flap was deemed most appropriate.  Using a sterile surgical marker, an appropriate bilobe flap drawn around the defect.    The area thus outlined was incised deep to adipose tissue with a #15 scalpel blade.  The skin margins were undermined to an appropriate distance in all directions utilizing iris scissors. Following this, the designed flap was advanced and carried over into the primary defect and sutured into place. Bi-Rhombic Flap Text: The defect edges were debeveled with a #15 scalpel blade.  Given the location of the defect and the proximity to free margins a bi-rhombic flap was deemed most appropriate.  Using a sterile surgical marker, an appropriate rhombic flap was drawn incorporating the defect. The area thus outlined was incised deep to adipose tissue with a #15 scalpel blade.  The skin margins were undermined to an appropriate distance in all directions utilizing iris scissors. Burow's Advancement Flap Text: The defect edges were debeveled with a #15 scalpel blade.  Given the location of the defect and the proximity to free margins a Burow's advancement flap was deemed most appropriate.  Using a sterile surgical marker, the appropriate advancement flap was drawn incorporating the defect and placing the expected incisions within the relaxed skin tension lines where possible.    The area thus outlined was incised deep to adipose tissue with a #15 scalpel blade.  The skin margins were undermined to an appropriate distance in all directions utilizing iris scissors. Following this, the designed flap was advanced and carried over into the primary defect and sutured into place. Chonodrocutaneous Helical Advancement Flap Text: The defect edges were debeveled with a #15 scalpel blade.  Given the location of the defect and the proximity to free margins a chondrocutaneous helical advancement flap was deemed most appropriate.  Using a sterile surgical marker, the appropriate advancement flap was drawn incorporating the defect and placing the expected incisions within the relaxed skin tension lines where possible.    The area thus outlined was incised deep to adipose tissue with a #15 scalpel blade.  The skin margins were undermined to an appropriate distance in all directions utilizing iris scissors. Following this, the designed flap was advanced and carried over into the primary defect and sutured into place. Crescentic Advancement Flap Text: The defect edges were debeveled with a #15 scalpel blade.  Given the location of the defect and the proximity to free margins a crescentic advancement flap was deemed most appropriate.  Using a sterile surgical marker, the appropriate advancement flap was drawn incorporating the defect and placing the expected incisions within the relaxed skin tension lines where possible.    The area thus outlined was incised deep to adipose tissue with a #15 scalpel blade.  The skin margins were undermined to an appropriate distance in all directions utilizing iris scissors. Following this, the designed flap was advanced and carried over into the primary defect and sutured into place. Dorsal Nasal Flap Text: The defect edges were debeveled with a #15 scalpel blade.  Given the location of the defect and the proximity to free margins a dorsal nasal flap was deemed most appropriate.  Using a sterile surgical marker, an appropriate dorsal nasal flap was drawn around the defect.    The area thus outlined was incised deep to adipose tissue with a #15 scalpel blade.  The skin margins were undermined to an appropriate distance in all directions utilizing iris scissors. Following this, the designed flap was advanced and carried over into the primary defect and sutured into place. Double Island Pedicle Flap Text: The defect edges were debeveled with a #15 scalpel blade.  Given the location of the defect, shape of the defect and the proximity to free margins a double island pedicle advancement flap was deemed most appropriate.  Using a sterile surgical marker, an appropriate advancement flap was drawn incorporating the defect, outlining the appropriate donor tissue and placing the expected incisions within the relaxed skin tension lines where possible.    The area thus outlined was incised deep to adipose tissue with a #15 scalpel blade.  The skin margins were undermined to an appropriate distance in all directions around the primary defect and laterally outward around the island pedicle utilizing iris scissors.  There was minimal undermining beneath the pedicle flap. Double O-Z Flap Text: The defect edges were debeveled with a #15 scalpel blade.  Given the location of the defect, shape of the defect and the proximity to free margins a Double O-Z flap was deemed most appropriate.  Using a sterile surgical marker, an appropriate transposition flap was drawn incorporating the defect and placing the expected incisions within the relaxed skin tension lines where possible. The area thus outlined was incised deep to adipose tissue with a #15 scalpel blade.  The skin margins were undermined to an appropriate distance in all directions utilizing iris scissors. Double O-Z Plasty Text: The defect edges were debeveled with a #15 scalpel blade.  Given the location of the defect, shape of the defect and the proximity to free margins a Double O-Z plasty (double transposition flap) was deemed most appropriate.  Using a sterile surgical marker, the appropriate transposition flaps were drawn incorporating the defect and placing the expected incisions within the relaxed skin tension lines where possible. The area thus outlined was incised deep to adipose tissue with a #15 scalpel blade.  The skin margins were undermined to an appropriate distance in all directions utilizing iris scissors.  Hemostasis was achieved with electrocautery.  The flaps were then transposed into place, one clockwise and the other counterclockwise, and anchored with interrupted buried subcutaneous sutures. Double Z Plasty Text: The lesion was extirpated to the level of the fat with a #15 scalpel blade. Given the location of the defect, shape of the defect and the proximity to free margins a double Z-plasty was deemed most appropriate for repair. Using a sterile surgical marker, the appropriate transposition arms of the double Z-plasty were drawn incorporating the defect and placing the expected incisions within the relaxed skin tension lines where possible. The area thus outlined was incised deep to adipose tissue with a #15 scalpel blade. The skin margins were undermined to an appropriate distance in all directions utilizing iris scissors. The opposing transposition arms were then transposed and carried over into place in opposite direction and anchored with interrupted buried subcutaneous sutures. Ear Star Wedge Flap Text: The defect edges were debeveled with a #15 blade scalpel.  Given the location of the defect and the proximity to free margins (helical rim) an ear star wedge flap was deemed most appropriate.  Using a sterile surgical marker, the appropriate flap was drawn incorporating the defect and placing the expected incisions between the helical rim and antihelix where possible.  The area thus outlined was incised through and through with a #15 scalpel blade. Following this, the designed flap was advanced and carried over into the primary defect and sutured into place. Flip-Flop Flap Text: The defect edges were debeveled with a #15 blade scalpel.  Given the location of the defect and the proximity to free margins a flip-flop flap was deemed most appropriate. Using a sterile surgical marker, the appropriate flap was drawn incorporating the defect and placing the expected incisions between the helical rim and antihelix where possible.  The area thus outlined was incised through and through with a #15 scalpel blade. Following this, the designed flap was carried over into the primary defect and sutured into place. Hatchet Flap Text: The defect edges were debeveled with a #15 scalpel blade.  Given the location of the defect, shape of the defect and the proximity to free margins a hatchet flap was deemed most appropriate.  Using a sterile surgical marker, an appropriate hatchet flap was drawn incorporating the defect and placing the expected incisions within the relaxed skin tension lines where possible.    The area thus outlined was incised deep to adipose tissue with a #15 scalpel blade.  The skin margins were undermined to an appropriate distance in all directions utilizing iris scissors. Helical Rim Advancement Flap Text: The defect edges were debeveled with a #15 blade scalpel.  Given the location of the defect and the proximity to free margins (helical rim) a double helical rim advancement flap was deemed most appropriate.  Using a sterile surgical marker, the appropriate advancement flaps were drawn incorporating the defect and placing the expected incisions between the helical rim and antihelix where possible.  The area thus outlined was incised through and through with a #15 scalpel blade.  With a skin hook and iris scissors, the flaps were gently and sharply undermined and freed up. Following this, the designed flap was advanced and carried over into the primary defect and sutured into place. H Plasty Text: Given the location of the defect, shape of the defect and the proximity to free margins a H-plasty was deemed most appropriate for repair.  Using a sterile surgical marker, the appropriate advancement arms of the H-plasty were drawn incorporating the defect and placing the expected incisions within the relaxed skin tension lines where possible. The area thus outlined was incised deep to adipose tissue with a #15 scalpel blade. The skin margins were undermined to an appropriate distance in all directions utilizing iris scissors.  The opposing advancement arms were then advanced into place in opposite direction and anchored with interrupted buried subcutaneous sutures. Island Pedicle Flap Text: The defect edges were debeveled with a #15 scalpel blade.  Given the location of the defect, shape of the defect and the proximity to free margins an island pedicle advancement flap was deemed most appropriate.  Using a sterile surgical marker, an appropriate advancement flap was drawn incorporating the defect, outlining the appropriate donor tissue and placing the expected incisions within the relaxed skin tension lines where possible.    The area thus outlined was incised deep to adipose tissue with a #15 scalpel blade.  The skin margins were undermined to an appropriate distance in all directions around the primary defect and laterally outward around the island pedicle utilizing iris scissors.  There was minimal undermining beneath the pedicle flap. Island Pedicle Flap With Canthal Suspension Text: The defect edges were debeveled with a #15 scalpel blade.  Given the location of the defect, shape of the defect and the proximity to free margins an island pedicle advancement flap was deemed most appropriate.  Using a sterile surgical marker, an appropriate advancement flap was drawn incorporating the defect, outlining the appropriate donor tissue and placing the expected incisions within the relaxed skin tension lines where possible. The area thus outlined was incised deep to adipose tissue with a #15 scalpel blade.  The skin margins were undermined to an appropriate distance in all directions around the primary defect and laterally outward around the island pedicle utilizing iris scissors.  There was minimal undermining beneath the pedicle flap. A suspension suture was placed in the canthal tendon to prevent tension and prevent ectropion. Island Pedicle Flap-Requiring Vessel Identification Text: The defect edges were debeveled with a #15 scalpel blade.  Given the location of the defect, shape of the defect and the proximity to free margins an island pedicle advancement flap was deemed most appropriate.  Using a sterile surgical marker, an appropriate advancement flap was drawn, based on the axial vessel mentioned above, incorporating the defect, outlining the appropriate donor tissue and placing the expected incisions within the relaxed skin tension lines where possible.    The area thus outlined was incised deep to adipose tissue with a #15 scalpel blade.  The skin margins were undermined to an appropriate distance in all directions around the primary defect and laterally outward around the island pedicle utilizing iris scissors.  There was minimal undermining beneath the pedicle flap. Keystone Flap Text: The defect edges were debeveled with a #15 scalpel blade.  Given the location of the defect, shape of the defect a keystone flap was deemed most appropriate.  Using a sterile surgical marker, an appropriate keystone flap was drawn incorporating the defect, outlining the appropriate donor tissue and placing the expected incisions within the relaxed skin tension lines where possible. The area thus outlined was incised deep to adipose tissue with a #15 scalpel blade.  The skin margins were undermined to an appropriate distance in all directions around the primary defect and laterally outward around the flap utilizing iris scissors. Melolabial Transposition Flap Text: The defect edges were debeveled with a #15 scalpel blade.  Given the location of the defect and the proximity to free margins a melolabial flap was deemed most appropriate.  Using a sterile surgical marker, an appropriate melolabial transposition flap was drawn incorporating the defect.    The area thus outlined was incised deep to adipose tissue with a #15 scalpel blade.  The skin margins were undermined to an appropriate distance in all directions utilizing iris scissors. Following this, the designed flap was advanced and carried over into the primary defect and sutured into place. Mercedes Flap Text: The defect edges were debeveled with a #15 scalpel blade.  Given the location of the defect, shape of the defect and the proximity to free margins a Mercedes flap was deemed most appropriate.  Using a sterile surgical marker, an appropriate advancement flap was drawn incorporating the defect and placing the expected incisions within the relaxed skin tension lines where possible. The area thus outlined was incised deep to adipose tissue with a #15 scalpel blade.  The skin margins were undermined to an appropriate distance in all directions utilizing iris scissors. Following this, the designed flap was advanced and carried over into the primary defect and sutured into place. Modified Advancement Flap Text: The defect edges were debeveled with a #15 scalpel blade.  Given the location of the defect, shape of the defect and the proximity to free margins a modified advancement flap was deemed most appropriate.  Using a sterile surgical marker, an appropriate advancement flap was drawn incorporating the defect and placing the expected incisions within the relaxed skin tension lines where possible.    The area thus outlined was incised deep to adipose tissue with a #15 scalpel blade.  The skin margins were undermined to an appropriate distance in all directions utilizing iris scissors. Mucosal Advancement Flap Text: Given the location of the defect, shape of the defect and the proximity to free margins a mucosal advancement flap was deemed most appropriate. Incisions were made with a 15 blade scalpel in the appropriate fashion along the cutaneous vermillion border and the mucosal lip. The remaining actinically damaged mucosal tissue was excised.  The mucosal advancement flap was then elevated to the gingival sulcus with care taken to preserve the neurovascular structures and advanced into the primary defect. Care was taken to ensure that precise realignment of the vermillion border was achieved. Following this, the designed flap was advanced and carried over into the primary defect and sutured into place. Muscle Hinge Flap Text: The defect edges were debeveled with a #15 scalpel blade.  Given the size, depth and location of the defect and the proximity to free margins a muscle hinge flap was deemed most appropriate.  Using a sterile surgical marker, an appropriate hinge flap was drawn incorporating the defect. The area thus outlined was incised with a #15 scalpel blade.  The skin margins were undermined to an appropriate distance in all directions utilizing iris scissors.Following this, the designed flap was advanced and carried over into the primary defect and sutured into place. Mustarde Flap Text: The defect edges were debeveled with a #15 scalpel blade.  Given the size, depth and location of the defect and the proximity to free margins a Mustarde flap was deemed most appropriate.  Using a sterile surgical marker, an appropriate flap was drawn incorporating the defect. The area thus outlined was incised with a #15 scalpel blade.  The skin margins were undermined to an appropriate distance in all directions utilizing iris scissors.Following this, the designed flap was advanced and carried over into the primary defect and sutured into place. Nasal Turnover Hinge Flap Text: The defect edges were debeveled with a #15 scalpel blade.  Given the size, depth, location of the defect and the defect being full thickness a nasal turnover hinge flap was deemed most appropriate.  Using a sterile surgical marker, an appropriate hinge flap was drawn incorporating the defect. The area thus outlined was incised with a #15 scalpel blade. The flap was designed to recreate the nasal mucosal lining and the alar rim. The skin margins were undermined to an appropriate distance in all directions utilizing iris scissors. Nasalis-Muscle-Based Myocutaneous Island Pedicle Flap Text: Using a #15 blade, an incision was made around the donor flap to the level of the nasalis muscle. Wide lateral undermining was then performed in both the subcutaneous plane above the nasalis muscle, and in a submuscular plane just above periosteum. This allowed the formation of a free nasalis muscle axial pedicle (based on the angular artery) which was still attached to the actual cutaneous flap, increasing its mobility and vascular viability. Hemostasis was obtained with pinpoint electrocoagulation. The flap was mobilized into position and the pivotal anchor points positioned and stabilized with buried interrupted sutures. Subcutaneous and dermal tissues were closed in a multilayered fashion with sutures. Tissue redundancies were excised, and the epidermal edges were apposed without significant tension and sutured with sutures. Nasalis Myocutaneous Flap Text: Using a #15 blade, an incision was made around the donor flap to the level of the nasalis muscle. Wide lateral undermining was then performed in both the subcutaneous plane above the nasalis muscle, and in a submuscular plane just above periosteum. This allowed the formation of a free nasalis muscle axial pedicle which was still attached to the actual cutaneous flap, increasing its mobility and vascular viability. Hemostasis was obtained with pinpoint electrocoagulation. The flap was mobilized into position and the pivotal anchor points positioned and stabilized with buried interrupted sutures. Subcutaneous and dermal tissues were closed in a multilayered fashion with sutures. Tissue redundancies were excised, and the epidermal edges were apposed without significant tension and sutured with sutures. Nasolabial Transposition Flap Text: The defect edges were debeveled with a #15 scalpel blade.  Given the size, depth and location of the defect and the proximity to free margins a nasolabial transposition flap was deemed most appropriate. Using a sterile surgical marker, an appropriate flap was drawn incorporating the defect. The area thus outlined was incised with a #15 scalpel blade. The skin margins were undermined to an appropriate distance in all directions utilizing iris scissors. Following this, the designed flap was carried into the primary defect and sutured into place. Orbicularis Oris Muscle Flap Text: The defect edges were debeveled with a #15 scalpel blade.  Given that the defect affected the competency of the oral sphincter an obicularis oris muscle flap was deemed most appropriate to restore this competency and normal muscle function.  Using a sterile surgical marker, an appropriate flap was drawn incorporating the defect. The area thus outlined was incised with a #15 scalpel blade. O-T Advancement Flap Text: The defect edges were debeveled with a #15 scalpel blade.  Given the location of the defect, shape of the defect and the proximity to free margins an O-T advancement flap was deemed most appropriate.  Using a sterile surgical marker, an appropriate advancement flap was drawn incorporating the defect and placing the expected incisions within the relaxed skin tension lines where possible.    The area thus outlined was incised deep to adipose tissue with a #15 scalpel blade.  The skin margins were undermined to an appropriate distance in all directions utilizing iris scissors. Following this, the designed flap was advanced and carried over into the primary defect and sutured into place. O-T Plasty Text: The defect edges were debeveled with a #15 scalpel blade.  Given the location of the defect, shape of the defect and the proximity to free margins an O-T plasty was deemed most appropriate.  Using a sterile surgical marker, an appropriate O-T plasty was drawn incorporating the defect and placing the expected incisions within the relaxed skin tension lines where possible.    The area thus outlined was incised deep to adipose tissue with a #15 scalpel blade.  The skin margins were undermined to an appropriate distance in all directions utilizing iris scissors. O-L Flap Text: The defect edges were debeveled with a #15 scalpel blade.  Given the location of the defect, shape of the defect and the proximity to free margins an O-L flap was deemed most appropriate.  Using a sterile surgical marker, an appropriate advancement flap was drawn incorporating the defect and placing the expected incisions within the relaxed skin tension lines where possible.    The area thus outlined was incised deep to adipose tissue with a #15 scalpel blade.  The skin margins were undermined to an appropriate distance in all directions utilizing iris scissors.Following this, the designed flap was advanced and carried over into the primary defect and sutured into place. O-Z Flap Text: The defect edges were debeveled with a #15 scalpel blade.  Given the location of the defect, shape of the defect and the proximity to free margins an O-Z flap was deemed most appropriate.  Using a sterile surgical marker, an appropriate transposition flap was drawn incorporating the defect and placing the expected incisions within the relaxed skin tension lines where possible. The area thus outlined was incised deep to adipose tissue with a #15 scalpel blade.  The skin margins were undermined to an appropriate distance in all directions utilizing iris scissors. Following this, the designed flap was advanced and carried over into the primary defect and sutured into place. O-Z Plasty Text: The defect edges were debeveled with a #15 scalpel blade.  Given the location of the defect, shape of the defect and the proximity to free margins an O-Z plasty (double transposition flap) was deemed most appropriate.  Using a sterile surgical marker, the appropriate transposition flaps were drawn incorporating the defect and placing the expected incisions within the relaxed skin tension lines where possible.    The area thus outlined was incised deep to adipose tissue with a #15 scalpel blade.  The skin margins were undermined to an appropriate distance in all directions utilizing iris scissors.  Hemostasis was achieved with electrocautery.  The flaps were then transposed into place, one clockwise and the other counterclockwise, and anchored with interrupted buried subcutaneous sutures. Peng Advancement Flap Text: The defect edges were debeveled with a #15 scalpel blade.  Given the location of the defect, shape of the defect and the proximity to free margins a Peng advancement flap was deemed most appropriate.  Using a sterile surgical marker, an appropriate advancement flap was drawn incorporating the defect and placing the expected incisions within the relaxed skin tension lines where possible. The area thus outlined was incised deep to adipose tissue with a #15 scalpel blade.  The skin margins were undermined to an appropriate distance in all directions utilizing iris scissors. Rectangular Flap Text: The defect edges were debeveled with a #15 scalpel blade. Given the location of the defect and the proximity to free margins a rectangular flap was deemed most appropriate. Using a sterile surgical marker, an appropriate rectangular flap was drawn incorporating the defect. The area thus outlined was incised deep to adipose tissue with a #15 scalpel blade. The skin margins were undermined to an appropriate distance in all directions utilizing iris scissors. Following this, the designed flap was carried over into the primary defect and sutured into place. Rhombic Flap Text: The defect edges were debeveled with a #15 scalpel blade.  Given the location of the defect and the proximity to free margins a rhombic flap was deemed most appropriate.  Using a sterile surgical marker, an appropriate rhombic flap was drawn incorporating the defect.    The area thus outlined was incised deep to adipose tissue with a #15 scalpel blade.  The skin margins were undermined to an appropriate distance in all directions utilizing iris scissors.Following this, the designed flap was advanced and carried over into the primary defect and sutured into place. Rhomboid Transposition Flap Text: The defect edges were debeveled with a #15 scalpel blade.  Given the location of the defect and the proximity to free margins a rhomboid transposition flap was deemed most appropriate.  Using a sterile surgical marker, an appropriate rhomboid flap was drawn incorporating the defect.    The area thus outlined was incised deep to adipose tissue with a #15 scalpel blade.  The skin margins were undermined to an appropriate distance in all directions utilizing iris scissors.Following this, the designed flap was advanced and carried over into the primary defect and sutured into place. Rotation Flap Text: The defect edges were debeveled with a #15 scalpel blade.  Given the location of the defect, shape of the defect and the proximity to free margins a rotation flap was deemed most appropriate.  Using a sterile surgical marker, an appropriate rotation flap was drawn incorporating the defect and placing the expected incisions within the relaxed skin tension lines where possible.    The area thus outlined was incised deep to adipose tissue with a #15 scalpel blade.  The skin margins were undermined to an appropriate distance in all directions utilizing iris scissors.Following this, the designed flap was advanced and carried over into the primary defect and sutured into place. Spiral Flap Text: The defect edges were debeveled with a #15 scalpel blade.  Given the location of the defect, shape of the defect and the proximity to free margins a spiral flap was deemed most appropriate.  Using a sterile surgical marker, an appropriate rotation flap was drawn incorporating the defect and placing the expected incisions within the relaxed skin tension lines where possible. The area thus outlined was incised deep to adipose tissue with a #15 scalpel blade.  The skin margins were undermined to an appropriate distance in all directions utilizing iris scissors. Following this, the designed flap was advanced and carried over into the primary defect and sutured into place. Staged Advancement Flap Text: The defect edges were debeveled with a #15 scalpel blade.  Given the location of the defect, shape of the defect and the proximity to free margins a staged advancement flap was deemed most appropriate.  Using a sterile surgical marker, an appropriate advancement flap was drawn incorporating the defect and placing the expected incisions within the relaxed skin tension lines where possible. The area thus outlined was incised deep to adipose tissue with a #15 scalpel blade.  The skin margins were undermined to an appropriate distance in all directions utilizing iris scissors. Star Wedge Flap Text: The defect edges were debeveled with a #15 scalpel blade.  Given the location of the defect, shape of the defect and the proximity to free margins a star wedge flap was deemed most appropriate.  Using a sterile surgical marker, an appropriate rotation flap was drawn incorporating the defect and placing the expected incisions within the relaxed skin tension lines where possible. The area thus outlined was incised deep to adipose tissue with a #15 scalpel blade.  The skin margins were undermined to an appropriate distance in all directions utilizing iris scissors. Following this, the designed flap was advanced and carried over into the primary defect and sutured into place. Transposition Flap Text: The defect edges were debeveled with a #15 scalpel blade.  Given the location of the defect and the proximity to free margins a transposition flap was deemed most appropriate.  Using a sterile surgical marker, an appropriate transposition flap was drawn incorporating the defect.    The area thus outlined was incised deep to adipose tissue with a #15 scalpel blade.  The skin margins were undermined to an appropriate distance in all directions utilizing iris scissors. Following this, the designed flap was advanced and carried over into the primary defect and sutured into place. Trilobed Flap Text: The defect edges were debeveled with a #15 scalpel blade.  Given the location of the defect and the proximity to free margins a trilobed flap was deemed most appropriate.  Using a sterile surgical marker, an appropriate trilobed flap drawn around the defect.    The area thus outlined was incised deep to adipose tissue with a #15 scalpel blade.  The skin margins were undermined to an appropriate distance in all directions utilizing iris scissors. Following this, the designed flap was advanced and carried over into the primary defect and sutured into place. V-Y Flap Text: The defect edges were debeveled with a #15 scalpel blade.  Given the location of the defect, shape of the defect and the proximity to free margins a V-Y flap was deemed most appropriate.  Using a sterile surgical marker, an appropriate advancement flap was drawn incorporating the defect and placing the expected incisions within the relaxed skin tension lines where possible.    The area thus outlined was incised deep to adipose tissue with a #15 scalpel blade.  The skin margins were undermined to an appropriate distance in all directions utilizing iris scissors. Following this, the designed flap was advanced and carried over into the primary defect and sutured into place. V-Y Plasty Text: The defect edges were debeveled with a #15 scalpel blade.  Given the location of the defect, shape of the defect and the proximity to free margins an V-Y advancement flap was deemed most appropriate.  Using a sterile surgical marker, an appropriate advancement flap was drawn incorporating the defect and placing the expected incisions within the relaxed skin tension lines where possible.    The area thus outlined was incised deep to adipose tissue with a #15 scalpel blade.  The skin margins were undermined to an appropriate distance in all directions utilizing iris scissors. W Plasty Text: The lesion was extirpated to the level of the fat with a #15 scalpel blade.  Given the location of the defect, shape of the defect and the proximity to free margins a W-plasty was deemed most appropriate for repair.  Using a sterile surgical marker, the appropriate transposition arms of the W-plasty were drawn incorporating the defect and placing the expected incisions within the relaxed skin tension lines where possible.    The area thus outlined was incised deep to adipose tissue with a #15 scalpel blade.  The skin margins were undermined to an appropriate distance in all directions utilizing iris scissors.  The opposing transposition arms were then transposed into place in opposite direction and anchored with interrupted buried subcutaneous sutures. Z Plasty Text: The lesion was extirpated to the level of the fat with a #15 scalpel blade.  Given the location of the defect, shape of the defect and the proximity to free margins a Z-plasty was deemed most appropriate for repair.  Using a sterile surgical marker, the appropriate transposition arms of the Z-plasty were drawn incorporating the defect and placing the expected incisions within the relaxed skin tension lines where possible.    The area thus outlined was incised deep to adipose tissue with a #15 scalpel blade.  The skin margins were undermined to an appropriate distance in all directions utilizing iris scissors.  The opposing transposition arms were then transposed into place in opposite direction and anchored with interrupted buried subcutaneous sutures. Zygomaticofacial Flap Text: Given the location of the defect, shape of the defect and the proximity to free margins a zygomaticofacial flap was deemed most appropriate for repair.  Using a sterile surgical marker, the appropriate flap was drawn incorporating the defect and placing the expected incisions within the relaxed skin tension lines where possible. The area thus outlined was incised deep to adipose tissue with a #15 scalpel blade with preservation of a vascular pedicle.  The skin margins were undermined to an appropriate distance in all directions utilizing iris scissors.  The flap was then placed into the defect and anchored with interrupted buried subcutaneous sutures. Abbe Flap (Lower To Upper Lip) Text: The defect of the upper lip was assessed and measured.  Given the location and size of the defect, an Abbe flap was deemed most appropriate. Using a sterile surgical marker, an appropriate Abbe flap was measured and drawn on the lower lip. Local anesthesia was then infiltrated. A scalpel was then used to incise the upper lip through and through the skin, vermilion, muscle and mucosa, leaving the flap pedicled on the opposite side.  The flap was then rotated and transferred to the lower lip defect.  The flap was then sutured into place with a three layer technique, closing the orbicularis oris muscle layer with subcutaneous buried sutures, followed by a mucosal layer and an epidermal layer. Abbe Flap (Upper To Lower Lip) Text: The defect of the lower lip was assessed and measured.  Given the location and size of the defect, an Abbe flap was deemed most appropriate. Using a sterile surgical marker, an appropriate Abbe flap was measured and drawn on the upper lip. Local anesthesia was then infiltrated.  A scalpel was then used to incise the upper lip through and through the skin, vermilion, muscle and mucosa, leaving the flap pedicled on the opposite side.  The flap was then rotated and transferred to the lower lip defect.  The flap was then sutured into place with a three layer technique, closing the orbicularis oris muscle layer with subcutaneous buried sutures, followed by a mucosal layer and an epidermal layer. Cheek Interpolation Flap Text: A decision was made to reconstruct the defect utilizing an interpolation axial flap and a staged reconstruction.  A telfa template was made of the defect.  This telfa template was then used to outline the Cheek Interpolation flap.  The donor area for the pedicle flap was then injected with anesthesia.  The flap was excised through the skin and subcutaneous tissue down to the layer of the underlying musculature.  .The interpolation flap was carefully excised within this deep plane to maintain its blood supply. Following this, the designed flap was advanced and carried over into the primary defect and sutured into place The edges of the donor site were undermined.   The donor site was closed in a primary fashion.  The pedicle was then rotated into position and sutured.  Once the tube was sutured into place, adequate blood supply was confirmed with blanching and refill.  The pedicle was then wrapped with xeroform gauze and dressed appropriately with a telfa and gauze bandage to ensure continued blood supply and protect the attached pedicle. Cheek-To-Nose Interpolation Flap Text: A decision was made to reconstruct the defect utilizing an interpolation axial flap and a staged reconstruction.  A telfa template was made of the defect.  This telfa template was then used to outline the Cheek-To-Nose Interpolation flap.  The donor area for the pedicle flap was then injected with anesthesia.  The flap was excised through the skin and subcutaneous tissue down to the layer of the underlying musculature.  The interpolation flap was carefully excised within this deep plane to maintain its blood supply.  Following this, the designed flap was advanced and carried over into the primary defect and sutured into place.The edges of the donor site were undermined.   The donor site was closed in a primary fashion.  The pedicle was then rotated into position and sutured.  Once the tube was sutured into place, adequate blood supply was confirmed with blanching and refill.  The pedicle was then wrapped with xeroform gauze and dressed appropriately with a telfa and gauze bandage to ensure continued blood supply and protect the attached pedicle. Estlander Flap (Lower To Upper Lip) Text: The defect of the lower lip was assessed and measured.  Given the location and size of the defect, an Estlander flap was deemed most appropriate. Using a sterile surgical marker, an appropriate Estlander flap was measured and drawn on the upper lip. Local anesthesia was then infiltrated. A scalpel was then used to incise the lateral aspect of the flap, through skin, muscle and mucosa, leaving the flap pedicled medially.  The flap was then rotated and positioned to fill the lower lip defect.  The flap was then sutured into place with a three layer technique, closing the orbicularis oris muscle layer with subcutaneous buried sutures, followed by a mucosal layer and an epidermal layer. Interpolation Flap Text: A decision was made to reconstruct the defect utilizing an interpolation axial flap and a staged reconstruction.  A telfa template was made of the defect.  This telfa template was then used to outline the interpolation flap.  The donor area for the pedicle flap was then injected with anesthesia.  The flap was excised through the skin and subcutaneous tissue down to the layer of the underlying musculature.  The interpolation flap was carefully excised within this deep plane to maintain its blood supply.  Following this, the designed flap was advanced and carried over into the primary defect and sutured into place. The edges of the donor site were undermined.   The donor site was closed in a primary fashion.  The pedicle was then rotated into position and sutured.  Once the tube was sutured into place, adequate blood supply was confirmed with blanching and refill.  The pedicle was then wrapped with xeroform gauze and dressed appropriately with a telfa and gauze bandage to ensure continued blood supply and protect the attached pedicle. Melolabial Interpolation Flap Text: A decision was made to reconstruct the defect utilizing an interpolation axial flap and a staged reconstruction.  A telfa template was made of the defect.  This telfa template was then used to outline the melolabial interpolation flap.  The donor area for the pedicle flap was then injected with anesthesia.  The flap was excised through the skin and subcutaneous tissue down to the layer of the underlying musculature.  The pedicle flap was carefully excised within this deep plane to maintain its blood supply.  The edges of the donor site were undermined.   The donor site was closed in a primary fashion.  The pedicle was then rotated into position and sutured.  Once the tube was sutured into place, adequate blood supply was confirmed with blanching and refill.  The pedicle was then wrapped with xeroform gauze and dressed appropriately with a telfa and gauze bandage to ensure continued blood supply and protect the attached pedicle. Mastoid Interpolation Flap Text: A decision was made to reconstruct the defect utilizing an interpolation axial flap and a staged reconstruction.  A telfa template was made of the defect.  This telfa template was then used to outline the mastoid interpolation flap.  The donor area for the pedicle flap was then injected with anesthesia.  The flap was excised through the skin and subcutaneous tissue down to the layer of the underlying musculature.  The pedicle flap was carefully excised within this deep plane to maintain its blood supply.  The edges of the donor site were undermined.   The donor site was closed in a primary fashion.  The pedicle was then rotated into position and sutured.  Once the tube was sutured into place, adequate blood supply was confirmed with blanching and refill.  The pedicle was then wrapped with xeroform gauze and dressed appropriately with a telfa and gauze bandage to ensure continued blood supply and protect the attached pedicle. Paramedian Forehead Flap Text: A decision was made to reconstruct the defect utilizing an interpolation axial flap and a staged reconstruction.  A telfa template was made of the defect.  This telfa template was then used to outline the paramedian forehead pedicle flap.  The donor area for the pedicle flap was then injected with anesthesia.  The flap was excised through the skin and subcutaneous tissue down to the layer of the underlying musculature.  The pedicle flap was carefully excised within this deep plane to maintain its blood supply.  The edges of the donor site were undermined.   The donor site was closed in a primary fashion.  The pedicle was then rotated into position and sutured.  Once the tube was sutured into place, adequate blood supply was confirmed with blanching and refill.  The pedicle was then wrapped with xeroform gauze and dressed appropriately with a telfa and gauze bandage to ensure continued blood supply and protect the attached pedicle. Posterior Auricular Interpolation Flap Text: A decision was made to reconstruct the defect utilizing an interpolation axial flap and a staged reconstruction.  A telfa template was made of the defect.  This telfa template was then used to outline the posterior auricular interpolation flap.  The donor area for the pedicle flap was then injected with anesthesia.  The flap was excised through the skin and subcutaneous tissue down to the layer of the underlying musculature.  The pedicle flap was carefully excised within this deep plane to maintain its blood supply.  The edges of the donor site were undermined.   The donor site was closed in a primary fashion.  The pedicle was then rotated into position and sutured.  Once the tube was sutured into place, adequate blood supply was confirmed with blanching and refill.  The pedicle was then wrapped with xeroform gauze and dressed appropriately with a telfa and gauze bandage to ensure continued blood supply and protect the attached pedicle. Cheiloplasty (Complex) Text: A decision was made to reconstruct the defect with a  cheiloplasty.  The defect was undermined extensively.  Additional obicularis oris muscle was excised with a 15 blade scalpel.  The defect was converted into a full thickness wedge to facilite a better cosmetic result.  Small vessels were then tied off with 5-0 monocyrl. The obicularis oris, superficial fascia, adipose and dermis were then reapproximated.  After the deeper layers were approximated the epidermis was reapproximated with particular care given to realign the vermillion border. Cheiloplasty (Less Than 50%) Text: A decision was made to reconstruct the defect with a  cheiloplasty.  The defect was undermined extensively.  Additional obicularis oris muscle was excised with a 15 blade scalpel.  The defect was converted into a full thickness wedge, of less than 50% of the vertical height of the lip, to facilite a better cosmetic result.  Small vessels were then tied off with 5-0 monocyrl. The obicularis oris, superficial fascia, adipose and dermis were then reapproximated.  After the deeper layers were approximated the epidermis was reapproximated with particular care given to realign the vermillion border. Ear Wedge Repair Text: A wedge excision was completed by carrying down an excision through the full thickness of the ear and cartilage with an inward facing Burow's triangle. Following this, the designed flap was advanced and carried over into the primary defect and sutured into place. The wound was then closed in a layered fashion. Full Thickness Lip Wedge Repair (Flap) Text: Given the location of the defect and the proximity to free margins a full thickness wedge repair was deemed most appropriate.  Using a sterile surgical marker, the appropriate repair was drawn incorporating the defect and placing the expected incisions perpendicular to the vermillion border.  The vermillion border was also meticulously outlined to ensure appropriate reapproximation during the repair.  The area thus outlined was incised through and through with a #15 scalpel blade.  The muscularis and dermis were reaproximated with deep sutures following hemostasis. Care was taken to realign the vermillion border before proceeding with the superficial closure.  Once the vermillion was realigned the superfical and mucosal closure was finished. Burow's Graft Text: The defect edges were debeveled with a #15 scalpel blade.  Given the location of the defect, shape of the defect, the proximity to free margins and the presence of a standing cone deformity a Burow's skin graft was deemed most appropriate. The standing cone was removed and this tissue was then trimmed to the shape of the primary defect. The adipose tissue was also removed until only dermis and epidermis were left.  The skin margins of the secondary defect were undermined to an appropriate distance in all directions utilizing iris scissors.  The secondary defect was closed with interrupted buried subcutaneous sutures.  The skin edges were then re-apposed with running  sutures.  The skin graft was then placed in the primary defect and oriented appropriately. Cartilage Graft Text: The defect edges were debeveled with a #15 scalpel blade.  Given the location of the defect, shape of the defect, the fact the defect involved a full thickness cartilage defect a cartilage graft was deemed most appropriate.  An appropriate donor site was identified, cleansed, and anesthetized. The cartilage graft was then harvested and transferred to the recipient site, oriented appropriately and then sutured into place.  The secondary defect was then repaired using a primary closure. Composite Graft Text: The defect edges were debeveled with a #15 scalpel blade.  Given the location of the defect, shape of the defect, the proximity to free margins and the fact the defect was full thickness a composite graft was deemed most appropriate.  The defect was outline and then transferred to the donor site.  A full thickness graft was then excised from the donor site. The graft was then placed in the primary defect, oriented appropriately and then sutured into place.  The secondary defect was then repaired using a primary closure. Epidermal Autograft Text: The defect edges were debeveled with a #15 scalpel blade.  Given the location of the defect, shape of the defect and the proximity to free margins an epidermal autograft was deemed most appropriate.  Using a sterile surgical marker, the primary defect shape was transferred to the donor site. The epidermal graft was then harvested.  The skin graft was then placed in the primary defect and oriented appropriately. Dermal Autograft Text: The defect edges were debeveled with a #15 scalpel blade.  Given the location of the defect, shape of the defect and the proximity to free margins a dermal autograft was deemed most appropriate.  Using a sterile surgical marker, the primary defect shape was transferred to the donor site. The area thus outlined was incised deep to adipose tissue with a #15 scalpel blade.  The harvested graft was then trimmed of adipose and epidermal tissue until only dermis was left.  The skin graft was then placed in the primary defect and oriented appropriately. Ftsg Text: The defect edges were debeveled with a #15 scalpel blade.  Given the location of the defect, shape of the defect and the proximity to free margins a full thickness skin graft was deemed most appropriate.  Using a sterile surgical marker, the primary defect shape was transferred to the donor site. The area thus outlined was incised deep to adipose tissue with a #15 scalpel blade.  The harvested graft was then trimmed of adipose tissue until only dermis and epidermis was left.  The skin margins of the secondary defect were undermined to an appropriate distance in all directions utilizing iris scissors.  The secondary defect was closed with interrupted buried subcutaneous sutures.  The skin edges were then re-apposed with running  sutures.  The skin graft was then placed in the primary defect and oriented appropriately. Pinch Graft Text: The defect edges were debeveled with a #15 scalpel blade. Given the location of the defect, shape of the defect and the proximity to free margins a pinch graft was deemed most appropriate. Using a sterile surgical marker, the primary defect shape was transferred to the donor site. The area thus outlined was incised deep to adipose tissue with a #15 scalpel blade.  The harvested graft was then trimmed of adipose tissue until only dermis and epidermis was left. The skin margins of the secondary defect were undermined to an appropriate distance in all directions utilizing iris scissors.  The secondary defect was closed with interrupted buried subcutaneous sutures.  The skin edges were then re-apposed with running  sutures.  The skin graft was then placed in the primary defect and oriented appropriately. Skin Substitute Text: The defect edges were debeveled with a #15 scalpel blade.  Given the location of the defect, shape of the defect and the proximity to free margins a skin substitute graft was deemed most appropriate.  The graft material was trimmed to fit the size of the defect. The graft was then placed in the primary defect and oriented appropriately. Split-Thickness Skin Graft Text: The defect edges were debeveled with a #15 scalpel blade.  Given the location of the defect, shape of the defect and the proximity to free margins a split thickness skin graft was deemed most appropriate.  Using a sterile surgical marker, the primary defect shape was transferred to the donor site. The split thickness graft was then harvested.  The skin graft was then placed in the primary defect and oriented appropriately. Tissue Cultured Epidermal Autograft Text: The defect edges were debeveled with a #15 scalpel blade.  Given the location of the defect, shape of the defect and the proximity to free margins a tissue cultured epidermal autograft was deemed most appropriate.  The graft was then trimmed to fit the size of the defect.  The graft was then placed in the primary defect and oriented appropriately. Xenograft Text: The defect edges were debeveled with a #15 scalpel blade.  Given the location of the defect, shape of the defect and the proximity to free margins a xenograft was deemed most appropriate.  The graft was then trimmed to fit the size of the defect.  The graft was then placed in the primary defect and oriented appropriately. Complex Repair And Flap Additional Text (Will Appearing After The Standard Complex Repair Text): The complex repair was not sufficient to completely close the primary defect. The remaining additional defect was repaired with the flap mentioned below. Complex Repair And Graft Additional Text (Will Appearing After The Standard Complex Repair Text): The complex repair was not sufficient to completely close the primary defect. The remaining additional defect was repaired with the graft mentioned below. Eyelid Full Thickness Repair - 22541: The eyelid defect was full thickness which required a wedge repair of the eyelid. Special care was taken to ensure that the eyelid margin was realligned when placing sutures. Eyelid Partial Thickness Repair - 42870: The eyelid defect was partial thickness which required a wedge repair of the eyelid. Special care was taken to ensure that the eyelid margin was realligned when placing sutures. Intermediate Repair And Flap Additional Text (Will Appearing After The Standard Complex Repair Text): The intermediate repair was not sufficient to completely close the primary defect. The remaining additional defect was repaired with the flap mentioned below. Intermediate Repair And Graft Additional Text (Will Appearing After The Standard Complex Repair Text): The intermediate repair was not sufficient to completely close the primary defect. The remaining additional defect was repaired with the graft mentioned below. Localized Dermabrasion With 15 Blade Text: The patient was draped in routine manner.  Localized dermabrasion using a 15 blade was performed in routine manner to papillary dermis. This spot dermabrasion is being performed to complete skin cancer reconstruction. It also will eliminate the other sun damaged precancerous cells that are known to be part of the regional effect of a lifetime's worth of sun exposure. This localized dermabrasion is therapeutic and should not be considered cosmetic in any regard. Localized Dermabrasion With Sand Papertext: The patient was draped in routine manner.  Localized dermabrasion using sterile sand paper was performed in routine manner to papillary dermis. This spot dermabrasion is being performed to complete skin cancer reconstruction. It also will eliminate the other sun damaged precancerous cells that are known to be part of the regional effect of a lifetime's worth of sun exposure. This localized dermabrasion is therapeutic and should not be considered cosmetic in any regard. Localized Dermabrasion With Wire Brush Text: The patient was draped in routine manner.  Localized dermabrasion using 3 x 17 mm wire brush was performed in routine manner to papillary dermis. This spot dermabrasion is being performed to complete skin cancer reconstruction. It also will eliminate the other sun damaged precancerous cells that are known to be part of the regional effect of a lifetime's worth of sun exposure. This localized dermabrasion is therapeutic and should not be considered cosmetic in any regard. Purse String (Simple) Text: Given the location of the defect and the characteristics of the surrounding skin a pursestring closure was deemed most appropriate.  Undermining was performed circumfirentially around the surgical defect.  A purstring suture was then placed and tightened. Purse String (Intermediate) Text: Given the location of the defect and the characteristics of the surrounding skin a pursestring intermediate closure was deemed most appropriate.  Undermining was performed circumfirentially around the surgical defect.  A purstring suture was then placed and tightened. Partial Purse String (Simple) Text: Given the location of the defect and the characteristics of the surrounding skin a simple purse string closure was deemed most appropriate.  Undermining was performed circumfirentially around the surgical defect.  A purse string suture was then placed and tightened. Wound tension only allowed a partial closure of the circular defect. Partial Purse String (Intermediate) Text: Given the location of the defect and the characteristics of the surrounding skin an intermediate purse string closure was deemed most appropriate.  Undermining was performed circumfirentially around the surgical defect.  A purse string suture was then placed and tightened. Wound tension only allowed a partial closure of the circular defect. Tarsorrhaphy Text: A tarsorrhaphy was performed using Frost sutures. Manual Repair Warning Statement: We plan on removing the manually selected variable below in favor of our much easier automatic structured text blocks found in the previous tab. We decided to do this to help make the flow better and give you the full power of structured data. Manual selection is never going to be ideal in our platform and I would encourage you to avoid using manual selection from this point on, especially since I will be sunsetting this feature. It is important that you do one of two things with the customized text below. First, you can save all of the text in a word file so you can have it for future reference. Second, transfer the text to the appropriate area in the Library tab. Lastly, if there is a flap or graft type which we do not have you need to let us know right away so I can add it in before the variable is hidden. No need to panic, we plan to give you roughly 6 months to make the change. Same Histology In Subsequent Stages Text: The pattern and morphology of the tumor is as described in the first stage. No Residual Tumor Seen Histology Text: There were no malignant cells seen in the sections examined. Inflammation Suggestive Of Cancer Camouflage Histology Text: There was a dense lymphocytic infiltrate which prevented adequate histologic evaluation of adjacent structures. Area Suspicious For Tumor Histology Text: CMS/RAC AUDITORS: Today a Mohs surgery was performed and also a separate procedure. The Mohs surgery slides were prepared in our laboratory and I read the sides personally. --------The tumor was highly infiltrative and contained widespread perineural squamous cell ----. All margins for the lesion removed by Mohs surgery were read by me. The final stage of Mohs surgery demonstrated dense inflammation requiring further analysis. In this case, additional tissue was sent for permanent section histology in order to assess highly-inflamed tissue using immunohistochemical stains to look for minute foci of -------squamous carcinoma. -------- Any RAC audit will be appealed to an ALJ level, as all coding/billing for this procedure was appropriate and follows CPT and the guidelines from the [employer] for sending additional tissues for permanent sections at Mohs surgery. Bcc Histology Text: There were numerous aggregates of basaloid cells. Bcc Infiltrative Histology Text: There were numerous aggregates of basaloid cells demonstrating an infiltrative pattern. Bcc Infundibulocystic Histology Text: There were numerous infundibulocystic aggregates of basaloid cells. Bcc  Nodular Histology Text: Hematoxylin and eosin stained sections reveal basketweave orthokeratosis overlyin an atrophic epidermis with dermal islands of nodular basaloid cells with a peripheral palisade and surrounding retraction artifact. Bcc Pigmented Histology Text: Hematoxylin and eosin stained sections reveal basketweave orthokeratosis overlyin an atrophic epidermis with dermal islands of basaloid cells with a peripheral palisade and surrounding retraction artifact. Mixed Nodular And Infiltrative Bcc Histology Text: There were numerous nodular and infiltrative aggregates of basaloid cells. Scc Histology Text: Hematoxylin and eosin stained sections reveal parakertaosis over an epidermis of variable thickness with disorganization and atypia of the basal and lower spinous layer keratinocytes with areas of full thickness atypia with buds of similar atypical keratinocytes extending into the underlying dermis Scc Ka Subtype Histology Text: glassy islands of atypical squamous epithelium. Neutrophilic microabcesses, eosinophils and elastic trapping are noted Scc In Situ Histology Text: Parakeratosis over and acanthotic epidermis with full thickness disorganization and atypia of the keratinocytes Merkel Cell Carcinoma Histology Text: dermal sheets and nests of cells with hyperchromatic nuclei, a high mitotic rate, and scat cytoplasm. Afx Histology Text: spindle cell neoplasm, consistent with atypical fibroxanthoma Desmoplastic Trichoepithelioma Histology Text: storiform spindle cell proliferation with infiltration into the subcutaneous tissue. Desmoplastic Trichilemmoma Histology Text: orthokeratosis over an atrophic epidermis. The dermis contains a dense sclerotic stroma containing small thin, basaloid islands of cells without retraction. Rare horn cysts and calcifications are identified, as well as a rare focus of granulomatous inflammation in association with a ruptured horn cyst. There are focal areas that resemble follicular papillae. Sebaceous Carcinoma Histology Text: nodule of relatively undifferentiated cells with areas of necrosis, many apototic cells and scattered mitotic figures. Sebaceous differentiation is noted in several areas of the tumor. Mart-1 - Positive Histology Text: MART-1 staining demonstrates areas of higher density and clustering of melanocytes with Pagetoid spread upwards within the epidermis. The surgical margins are positive for tumor cells. Mart-1 - Negative Histology Text: MART-1 staining demonstrates a normal density and pattern of melanocytes along the dermal-epidermal junction. The surgical margins are negative for tumor cells. Information: Selecting Yes will display possible errors in your note based on the variables you have selected. This validation is only offered as a suggestion for you. PLEASE NOTE THAT THE VALIDATION TEXT WILL BE REMOVED WHEN YOU FINALIZE YOUR NOTE. IF YOU WANT TO FAX A PRELIMINARY NOTE YOU WILL NEED TO TOGGLE THIS TO 'NO' IF YOU DO NOT WANT IT IN YOUR FAXED NOTE. Bill 59 Modifier?: No - Continue to Bill 79 Modifier

## 2024-07-31 NOTE — PATIENT PROFILE ADULT - LOCATION #1
Qbrexza Pregnancy And Lactation Text: There is no available data on Qbrexza use in pregnant women.  There is no available data on Qbrexza use in lactation. sacral

## 2024-08-05 NOTE — ED ADULT NURSE NOTE - CAS EDN DISCHARGE ASSESSMENT
Group Therapy Note    Date: 8/5/2024    Group Start Time: 0900  Group End Time: 0930  Group Topic: Community Meeting    SSR 2  NON ACUTE    Nayely Goyal RN    MORNING GOALS GROUP:     Group Therapy Note    Attendees: 2/6       Patient's Goal:  Patient states that her goal is to take a shower and work on her patience level. Patient voiced that she was ready to go home and her patience was running out.     Notes:  Patient is pleasant with communications and social and interactive in group. Patient offers support to peers. Patient did state that her mood is \"frustrated\" because she is ready to go home. Patient discussed \"wants to go back to school and either do culinary, hair, or something with specializes with skin and helping people out.\" Patient immediate plan once discharge is she wants to finish reading her book at the park that she was reading.     Status After Intervention:  Improved    Participation Level: Active Listener and Interactive    Participation Quality: Appropriate, Attentive, Sharing, and Supportive      Speech:  normal      Thought Process/Content: Logical      Affective Functioning: Congruent      Mood: euphoric      Level of consciousness:  Alert, Oriented x4, and Attentive      Response to Learning: Able to verbalize current knowledge/experience, Able to verbalize/acknowledge new learning, Able to retain information, Capable of insight, and Progressing to goal      Endings: None Reported    Modes of Intervention: Education, Support, and Socialization      Discipline Responsible: Registered Nurse      Signature:  Nayely Goyal RN     Patient baseline mental status

## 2024-08-20 NOTE — DIETITIAN INITIAL EVALUATION ADULT. - NUTRITION DIAGNOSIS
Preventing Falls: Care Instructions  Injuries and health problems such as trouble walking or poor eyesight can increase your risk of falling. So can some medicines. But there are things you can do to help prevent falls. You can exercise to get stronger. You can also arrange your home to make it safer.    Talk to your doctor about the medicines you take. Ask if any of them increase the risk of falls and whether they can be changed or stopped.   Try to exercise regularly. It can help improve your strength and balance. This can help lower your risk of falling.         Practice fall safety and prevention.   Wear low-heeled shoes that fit well and give your feet good support. Talk to your doctor if you have foot problems that make this hard.  Carry a cellphone or wear a medical alert device that you can use to call for help.  Use stepladders instead of chairs to reach high objects. Don't climb if you're at risk for falls. Ask for help, if needed.  Wear the correct eyeglasses, if you need them.        Make your home safer.   Remove rugs, cords, clutter, and furniture from walkways.  Keep your house well lit. Use night-lights in hallways and bathrooms.  Install and use sturdy handrails on stairways.  Wear nonskid footwear, even inside. Don't walk barefoot or in socks without shoes.        Be safe outside.   Use handrails, curb cuts, and ramps whenever possible.  Keep your hands free by using a shoulder bag or backpack.  Try to walk in well-lit areas. Watch out for uneven ground, changes in pavement, and debris.  Be careful in the winter. Walk on the grass or gravel when sidewalks are slippery. Use de-icer on steps and walkways. Add non-slip devices to shoes.    Put grab bars and nonskid mats in your shower or tub and near the toilet. Try to use a shower chair or bath bench when bathing.   Get into a tub or shower by putting in your weaker leg first. Get out with your strong side first. Have a phone or medical alert  yes...

## 2024-11-22 NOTE — PROGRESS NOTE ADULT - PROBLEM SELECTOR PROBLEM 5
[de-identified] : 11/2024 ECG shows NSR without ST-T wave abnormalities. 
Pressure injury of sacral region, stage 4

## 2025-04-21 NOTE — PROGRESS NOTE ADULT - PROBLEM SELECTOR PLAN 5
Operative Report     [facility]    Patient: Brittany Russo  Age:      40 y.o.  :     1984  Sex:      female    Medical Record:  [id number]    Date of Operation/Procedure:  2025    Pre-operative Diagnosis:  R urolithiasis    Post-operative Diagnosis:  R urolithiasis    Surgeon:  Bry Gillis MD    Name of Operation/Procedure:  Procedure(s) and Anesthesia Type:     * RIGHT EXTRACORPOREAL SHOCKWAVE LITHOTRIPSY WITH STENT INSERTION - General    Findings/Complications:      Radio-opaque stone;    Description of procedure:     After appropriate discussion of risks and benefits and informed consent obtained, patient transported to operating room and positioned in supine position on operating table. Timeout completed. Pre operative antibiotics administered. Spot fluoroscopy demonstrated the stone in proximal ureter. Cystoscope introduced and right ureteral orifice cannulated with sensor guidewire. Advanced up to renal pelvis under fluoroscopic guidance. Stone could be seen in proximal ureter and with the help of a pollack catheter it was pushed up into lower pole kidney. A 7Fr x 26cm double J stent was then placed over wire and up to renal pelvis. Wire pulled, leaving good proximal and distal curl. Then began lithotripsy at a power of 2 and rate of 120 and slowly increased to a power of 7 for 3000 shocks. Good lithotripsy of the stone though it wa shard to tell at the end. Procedure concluded. Patient extubated and transported to PACU in stable condition.    Plan: one week in office with CT stone      Estimated Blood Loss: none    Specimens: * No orders in the log *    Fluids/Drains: 7Fr x 26cm double J stent    Bry Gillis MD  2025  12:45 EDT     
continue meds
continue with plavix
as above
continue current meds. this is chronic in nature
continue meds  zoloft and bzd
improving   clinically will  monitor
insulin sliding scale as needed
medical marijuan helping   continue fentanyl as well
monitor glucose   dong well
off abx and will monitor clinically   id input appreciated
on bzd's   Zoloft and seroquel and well as mirtazipine
on medical marijuana which has been helping the patient much more than the current chronic pains
using home medical marijuana   helping with pain   on dilaudid prn

## 2025-04-24 NOTE — PATIENT PROFILE ADULT. - PATIENT REPRESENTATIVE: ( YOU CAN CHOOSE ANY PERSON THAT CAN ASSIST YOU WITH YOUR HEALTH CARE PREFERENCES, DOES NOT HAVE TO BE A SPOUSE, IMMEDIATE FAMILY OR SIGNIFICANT OTHER/PARTNER)
Yes Quality 47: Advance Care Plan: Advance care planning not documented, reason not otherwise specified. Detail Level: Detailed Quality 226: Preventive Care And Screening: Tobacco Use: Screening And Cessation Intervention: Patient screened for tobacco use and is an ex/non-smoker

## 2025-05-20 NOTE — ED ADULT NURSE NOTE - [facility] EDP DISCH TYPE
Orders:    simvastatin (ZOCOR) 20 MG tablet; Take 1 tablet by mouth nightly    POCT INR    
 Chronic, at goal (stable), continue current treatment plan    Orders:    POCT INR    
 Chronic, at goal (stable), continue current treatment plan    Orders:    POCT INR    
 Chronic, at goal (stable), continue current treatment plan    Orders:    POCT glycosylated hemoglobin (Hb A1C)    
 Chronic, at goal (stable), continue current treatment plan    Orders:    enalapril (VASOTEC) 10 MG tablet; TAKE 1 TABLET IN MORNING and TAKE 1/2 TABLET IN EVENING    amLODIPine (NORVASC) 5 MG tablet; Take 1 tablet by mouth daily    POCT INR    
 Chronic, at goal (stable), continue current treatment plan    Orders:    metoprolol tartrate (LOPRESSOR) 50 MG tablet; TAKE ONE TABLET BY MOUTH TWO TIMES A DAY    POCT INR    
 Continue current meds, consideration for increased diuretic dose pending clinical reassessment after amlodipine dose reduced. Previously trialed Jardiance, cost limiting in past so it was stopped.         Reduced amlodipine this visit  Reassess clinical volume status and CHF labs next visit.  Consider retrial jardiance (previously tried but co-pay limiting.  
Home